# Patient Record
Sex: MALE | Race: WHITE | NOT HISPANIC OR LATINO | Employment: OTHER | URBAN - METROPOLITAN AREA
[De-identification: names, ages, dates, MRNs, and addresses within clinical notes are randomized per-mention and may not be internally consistent; named-entity substitution may affect disease eponyms.]

---

## 2017-01-06 ENCOUNTER — HOSPITAL ENCOUNTER (OUTPATIENT)
Dept: RADIOLOGY | Facility: HOSPITAL | Age: 67
Discharge: HOME | End: 2017-01-06
Attending: SURGERY
Payer: MEDICARE

## 2017-01-06 DIAGNOSIS — I70.1 ATHEROSCLEROSIS OF RENAL ARTERY: ICD-10-CM

## 2017-01-06 PROCEDURE — 70498 CT ANGIOGRAPHY NECK: CPT | Mod: TC

## 2017-01-06 PROCEDURE — 25500020 PHARM REV CODE 255: Performed by: SURGERY

## 2017-01-06 PROCEDURE — 74174 CTA ABD&PLVS W/CONTRAST: CPT | Mod: 26,,, | Performed by: INTERNAL MEDICINE

## 2017-01-06 PROCEDURE — 71275 CT ANGIOGRAPHY CHEST: CPT | Mod: 26,,, | Performed by: INTERNAL MEDICINE

## 2017-01-06 PROCEDURE — 70498 CT ANGIOGRAPHY NECK: CPT | Mod: 26,,, | Performed by: INTERNAL MEDICINE

## 2017-01-06 PROCEDURE — 74174 CTA ABD&PLVS W/CONTRAST: CPT | Mod: TC

## 2017-01-06 RX ADMIN — IOHEXOL 100 ML: 350 INJECTION, SOLUTION INTRAVENOUS at 10:01

## 2017-01-31 ENCOUNTER — HOSPITAL ENCOUNTER (OUTPATIENT)
Dept: PREADMISSION TESTING | Facility: HOSPITAL | Age: 67
Discharge: HOME | End: 2017-01-31
Attending: SURGERY
Payer: MEDICARE

## 2017-01-31 VITALS
SYSTOLIC BLOOD PRESSURE: 179 MMHG | OXYGEN SATURATION: 97 % | HEART RATE: 61 BPM | BODY MASS INDEX: 18.79 KG/M2 | HEIGHT: 68 IN | WEIGHT: 124 LBS | DIASTOLIC BLOOD PRESSURE: 71 MMHG | RESPIRATION RATE: 18 BRPM | TEMPERATURE: 97 F

## 2017-01-31 DIAGNOSIS — I65.29 CAROTID STENOSIS: ICD-10-CM

## 2017-01-31 LAB
ANION GAP SERPL CALC-SCNC: 7 MMOL/L
BASOPHILS # BLD AUTO: 0.05 K/UL
BASOPHILS NFR BLD: 0.6 %
BUN SERPL-MCNC: 18 MG/DL
CALCIUM SERPL-MCNC: 9.3 MG/DL
CHLORIDE SERPL-SCNC: 95 MMOL/L
CO2 SERPL-SCNC: 31 MMOL/L
CREAT SERPL-MCNC: 0.9 MG/DL
DIFFERENTIAL METHOD: ABNORMAL
EOSINOPHIL # BLD AUTO: 1.6 K/UL
EOSINOPHIL NFR BLD: 19.1 %
ERYTHROCYTE [DISTWIDTH] IN BLOOD BY AUTOMATED COUNT: 12.1 %
EST. GFR  (AFRICAN AMERICAN): >60 ML/MIN/1.73 M^2
EST. GFR  (NON AFRICAN AMERICAN): >60 ML/MIN/1.73 M^2
GLUCOSE SERPL-MCNC: 78 MG/DL
HCT VFR BLD AUTO: 42.6 %
HGB BLD-MCNC: 14.7 G/DL
INR PPP: 0.9
LYMPHOCYTES # BLD AUTO: 1.2 K/UL
LYMPHOCYTES NFR BLD: 14.2 %
MCH RBC QN AUTO: 33 PG
MCHC RBC AUTO-ENTMCNC: 34.5 %
MCV RBC AUTO: 96 FL
MONOCYTES # BLD AUTO: 1 K/UL
MONOCYTES NFR BLD: 11.9 %
NEUTROPHILS # BLD AUTO: 4.5 K/UL
NEUTROPHILS NFR BLD: 54.2 %
PLATELET # BLD AUTO: 241 K/UL
PMV BLD AUTO: 9.3 FL
POTASSIUM SERPL-SCNC: 3.7 MMOL/L
PROTHROMBIN TIME: 10 SEC
RBC # BLD AUTO: 4.45 M/UL
SODIUM SERPL-SCNC: 133 MMOL/L
WBC # BLD AUTO: 8.23 K/UL

## 2017-01-31 PROCEDURE — 85610 PROTHROMBIN TIME: CPT

## 2017-01-31 PROCEDURE — 85025 COMPLETE CBC W/AUTO DIFF WBC: CPT

## 2017-01-31 PROCEDURE — 36415 COLL VENOUS BLD VENIPUNCTURE: CPT

## 2017-01-31 PROCEDURE — 80048 BASIC METABOLIC PNL TOTAL CA: CPT

## 2017-01-31 NOTE — PLAN OF CARE
Pre-operative instructions, medication directives and pain scales reviewed with . All questions the patient had  were answered. Re-assurance about surgical procedure and day of surgery routine given as needed. The patient verbalized understanding of the pre-op instructions.    2/1/17   ANGIOGRAM-CAROTID-   bilat

## 2017-01-31 NOTE — IP AVS SNAPSHOT
[place] [address] [place] [address]  Phone: [phone]           Patient Discharge Instructions    Our goal is to set you up for success. This packet includes information on your condition, medications, and your home care. It will help you to care for yourself so you don't get sicker.     Please ask your nurse if you have any questions.        There are many details to remember when preparing for your surgery. Here is what you will need to do, please ask your nurse if there are more specific instructions and if you have any questions:    1. 24 hours before procedure Do not smoke or drink alcoholic beverages 24 hours prior to your procedure    2. Eating before procedure Do not eat or drink anything 8 hours before your procedure - this includes gum, mints, and candy.     3. Day of procedure Please remove all jewelry for the procedure. If you wear contact lenses, dentures, hearing aids or glasses, bring a container to put them in during your surgery and give to a family member for safekeeping.  If your doctor has scheduled you for an overnight stay, bring a small overnight bag with any personal items that you need.    4. After procedure Make arrangements in advance for transportation home by a responsible adult. It is not safe to drive a vehicle during the 24 hours following surgery.     PLEASE NOTE: You may be contacted the day before your surgery to confirm your surgery date and arrival time. The Surgery schedule has many variables which may affect the time of your surgery case. Family members should be available if your surgery time changes.                ** Verify the list of medication(s) below is accurate and up to date. Carry this with you in case of emergency. If your medications have changed, please notify your healthcare provider.             Medication List      TAKE these medications        Additional Info                      amlodipine 10 MG tablet   Commonly known  as:  NORVASC   Refills:  3   Dose:  10 mg    Instructions:  Take 10 mg by mouth once daily.     Begin Date    AM    Noon    PM    Bedtime       aspirin 81 MG EC tablet   Commonly known as:  ECOTRIN   Refills:  0   Dose:  81 mg    Instructions:  Take 81 mg by mouth once daily.     Begin Date    AM    Noon    PM    Bedtime       atorvastatin 20 MG tablet   Commonly known as:  LIPITOR   Refills:  3   Dose:  20 mg    Instructions:  Take 20 mg by mouth once daily.     Begin Date    AM    Noon    PM    Bedtime       hydrochlorothiazide 25 MG tablet   Commonly known as:  HYDRODIURIL   Refills:  0   Dose:  25 mg    Instructions:  Take 25 mg by mouth every evening.     Begin Date    AM    Noon    PM    Bedtime       lisinopril 40 MG tablet   Commonly known as:  PRINIVIL,ZESTRIL   Refills:  3   Dose:  40 mg    Instructions:  Take 40 mg by mouth once daily.     Begin Date    AM    Noon    PM    Bedtime       losartan 100 MG tablet   Commonly known as:  COZAAR   Refills:  0   Dose:  1 tablet    Instructions:  Take 1 tablet by mouth once daily.     Begin Date    AM    Noon    PM    Bedtime       metoprolol succinate 200 MG 24 hr tablet   Commonly known as:  TOPROL-XL   Refills:  2   Dose:  200 mg    Instructions:  Take 200 mg by mouth 2 (two) times daily.     Begin Date    AM    Noon    PM    Bedtime                  Please bring to all follow up appointments:    1. A copy of your discharge instructions.  2. All medicines you are currently taking in their original bottles.  3. Identification and insurance card.    Please arrive 15 minutes ahead of scheduled appointment time.    Please call 24 hours in advance if you must reschedule your appointment and/or time.        Your Scheduled Appointments     Jan 31, 2017  2:30 PM CST   Consult with Jaci Prieto MD   [facility] ([facility])    [address] [facility] [address] [place] [address]   [phone]            Feb 01, 2017  8:00 AM CST   Ir Dosc with [facility] IR1    [facility] ([facility]    [place] [id number] [phone]              Your Future Surgeries/Procedures     Feb 01, 2017   Surgery with [facility] Diagnostic Provider   [facility] ([facility]    [place] [id number] [phone]                  Discharge Instructions       Your   Carotid   angiogram is scheduled for ___tomorrow   2/1/17_________________.    Please report to SAME DAY SURGERY UNIT on the 2nd FLOOR at __0645-0700_____ a.m.      INSTRUCTIONS IMPORTANT!!!  ¨ Do not eat or drink after 12 midnight-including water. OK to brush teeth, no   gum, candy or mints!    ¨ Take only these medicines with a small swallow of water-morning of angiogram.    Regular morning BP medications except no Hydrochlorothiazide      X____Shower with Hibiclens and wash both groin  Areas  with Hibiclens each time.  Be sure to rinse your skin completely.  X____  No powder, lotions or creams below your waist.  X____  You may wear only deodorant on the day of the angiogram.  X____  No money or valuables needed. Please leave at home.  X____  If going home the same day, arrange for a ride home. You will not be able to             drive.  X____  Wear loose fitting clothing. Allow for dressings, bandages.  X____  No   Ibuprofen, Motrin and Aleve  before  the angiogram.            You MAY use Tylenol/acetaminophen until day of surgery.          I have read or had read and explained to me, and understand the above information.  Additional comments or instructions.                 Admission Information     Date & Time Provider Department CSN    1/31/2017  1:00 PM Andry Torres MD [facility] [id number]      Care Providers     Provider Role Specialty Primary office phone    Andry Torres MD Attending Provider General Surgery [phone]      Your Vitals Were     BP Pulse Temp Resp Height Weight    179/71 (BP  "Location: Left arm, Patient Position: Sitting, BP Method: Automatic) 61 97.2 °F (36.2 °C) (Oral) 18 5' 8" (1.727 m) 56.2 kg (124 lb)    SpO2 BMI             97% 18.85 kg/m2         Recent Lab Values     No lab values to display.      Allergies as of 1/31/2017     No Known Allergies      [facility]     [facility] Care [facility] - 24/7 Assistance  Unless otherwise directed by your provider, please contact [facility], our nurse care line that is available for 24/7 assistance.     Registered nurses in the [facility] provide clinical advisement, health education, appointment booking, and other advisory services.  Call for this free service at [phone].        Advance Directives     An advance directive is a document which, in the event you are no longer able to make decisions for yourself, tells your healthcare team what kind of treatment you do or do not want to receive, or who you would like to make those decisions for you.  If you do not currently have an advance directive, [facility] encourages you to create one.  For more information call:  [phone]), [phone] ([phone]),  or log on to [email or web address].        Smoking Cessation     If you would like to quit smoking:   You may be eligible for free services if you are a [place] resident and started smoking cigarettes before September 1, 1988.  Call the Smoking Cessation Trust ([facility]) toll free at (226) 387-8256 or (892) 071-5348.   Call 8-533-QUIT-NOW if you do not meet the above criteria.            Language Assistance Services     ATTENTION: Language assistance services are available, free of charge. Please call [phone].      ATENCIÓN: Si habla español, tiene a quinn disposición servicios gratuitos de asistencia lingüística. Llame al [phone].     CHÚ Ý: N?u b?n nói Ti?ng Vi?t, có các d?ch v? h? tr? ngôn ng? mi?n phí dành cho b?n. G?i s? [phone].        [facility] Sign-Up     " Activating your [facility] account is as easy as 1-2-3!     1) Visit [email or web address], select Sign Up Now, enter this activation code and your date of birth, then select Next.  Activation code not generated  Current Patient Portal Status: Account disabled      2) Create a username and password to use when you visit [facility] in the future and select a security question in case you lose your password and select Next.    3) Enter your e-mail address and click Sign Up!    Additional Information  If you have questions, please e-mail [email or web address] or call [phone] to talk to our [facility] staff. Remember, [facility] is NOT to be used for urgent needs. For medical emergencies, dial 911.          [facility] complies with applicable Federal civil rights laws and does not discriminate on the basis of race, color, national origin, age, disability, or sex.

## 2017-01-31 NOTE — DISCHARGE INSTRUCTIONS
Your   Carotid   angiogram is scheduled for ___tomorrow   2/1/17_________________.    Please report to SAME DAY SURGERY UNIT on the 2nd FLOOR at __0645-0700_____ a.m.      INSTRUCTIONS IMPORTANT!!!  ¨ Do not eat or drink after 12 midnight-including water. OK to brush teeth, no   gum, candy or mints!    ¨ Take only these medicines with a small swallow of water-morning of angiogram.    Regular morning BP medications except no Hydrochlorothiazide      X____Shower with Hibiclens and wash both groin  Areas  with Hibiclens each time.  Be sure to rinse your skin completely.  X____  No powder, lotions or creams below your waist.  X____  You may wear only deodorant on the day of the angiogram.  X____  No money or valuables needed. Please leave at home.  X____  If going home the same day, arrange for a ride home. You will not be able to             drive.  X____  Wear loose fitting clothing. Allow for dressings, bandages.  X____  No   Ibuprofen, Motrin and Aleve  before  the angiogram.            You MAY use Tylenol/acetaminophen until day of surgery.          I have read or had read and explained to me, and understand the above information.  Additional comments or instructions.

## 2017-02-01 ENCOUNTER — SURGERY (OUTPATIENT)
Age: 67
End: 2017-02-01

## 2017-02-01 ENCOUNTER — HOSPITAL ENCOUNTER (OUTPATIENT)
Facility: HOSPITAL | Age: 67
Discharge: HOME | End: 2017-02-01
Attending: SURGERY | Admitting: RADIOLOGY
Payer: MEDICARE

## 2017-02-01 VITALS
BODY MASS INDEX: 18.77 KG/M2 | RESPIRATION RATE: 18 BRPM | HEIGHT: 68 IN | WEIGHT: 123.88 LBS | OXYGEN SATURATION: 97 % | SYSTOLIC BLOOD PRESSURE: 154 MMHG | DIASTOLIC BLOOD PRESSURE: 88 MMHG | TEMPERATURE: 98 F | HEART RATE: 66 BPM

## 2017-02-01 DIAGNOSIS — I65.29 CAROTID STENOSIS: ICD-10-CM

## 2017-02-01 DIAGNOSIS — I77.9 CAROTID ARTERY DISEASE: ICD-10-CM

## 2017-02-01 PROCEDURE — 99153 MOD SED SAME PHYS/QHP EA: CPT

## 2017-02-01 PROCEDURE — 99152 MOD SED SAME PHYS/QHP 5/>YRS: CPT

## 2017-02-01 PROCEDURE — 63600175 PHARM REV CODE 636 W HCPCS: Performed by: RADIOLOGY

## 2017-02-01 PROCEDURE — 25000003 PHARM REV CODE 250: Performed by: RADIOLOGY

## 2017-02-01 PROCEDURE — 25500020 PHARM REV CODE 255: Performed by: SURGERY

## 2017-02-01 RX ORDER — HEPARIN SODIUM 1000 [USP'U]/ML
INJECTION, SOLUTION INTRAVENOUS; SUBCUTANEOUS CODE/TRAUMA/SEDATION MEDICATION
Status: COMPLETED | OUTPATIENT
Start: 2017-02-01 | End: 2017-02-01

## 2017-02-01 RX ORDER — FENTANYL CITRATE 50 UG/ML
INJECTION, SOLUTION INTRAMUSCULAR; INTRAVENOUS CODE/TRAUMA/SEDATION MEDICATION
Status: COMPLETED | OUTPATIENT
Start: 2017-02-01 | End: 2017-02-01

## 2017-02-01 RX ORDER — HYDROCODONE BITARTRATE AND ACETAMINOPHEN 5; 325 MG/1; MG/1
1 TABLET ORAL EVERY 4 HOURS PRN
Status: DISCONTINUED | OUTPATIENT
Start: 2017-02-01 | End: 2017-02-01 | Stop reason: HOSPADM

## 2017-02-01 RX ORDER — MIDAZOLAM HYDROCHLORIDE 1 MG/ML
INJECTION, SOLUTION INTRAMUSCULAR; INTRAVENOUS CODE/TRAUMA/SEDATION MEDICATION
Status: COMPLETED | OUTPATIENT
Start: 2017-02-01 | End: 2017-02-01

## 2017-02-01 RX ORDER — SODIUM CHLORIDE 9 MG/ML
INJECTION, SOLUTION INTRAVENOUS CONTINUOUS
Status: DISCONTINUED | OUTPATIENT
Start: 2017-02-01 | End: 2017-02-01 | Stop reason: HOSPADM

## 2017-02-01 RX ORDER — HYDRALAZINE HYDROCHLORIDE 20 MG/ML
INJECTION INTRAMUSCULAR; INTRAVENOUS CODE/TRAUMA/SEDATION MEDICATION
Status: COMPLETED | OUTPATIENT
Start: 2017-02-01 | End: 2017-02-01

## 2017-02-01 RX ADMIN — IOHEXOL 180 ML: 300 INJECTION, SOLUTION INTRAVENOUS at 09:02

## 2017-02-01 RX ADMIN — MIDAZOLAM HYDROCHLORIDE 0.5 MG: 1 INJECTION, SOLUTION INTRAMUSCULAR; INTRAVENOUS at 08:02

## 2017-02-01 RX ADMIN — HEPARIN SODIUM 4000 UNITS: 1000 INJECTION, SOLUTION INTRAVENOUS; SUBCUTANEOUS at 08:02

## 2017-02-01 RX ADMIN — FENTANYL CITRATE 25 MCG: 50 INJECTION INTRAMUSCULAR; INTRAVENOUS at 08:02

## 2017-02-01 RX ADMIN — SODIUM CHLORIDE: 0.9 INJECTION, SOLUTION INTRAVENOUS at 11:02

## 2017-02-01 RX ADMIN — HYDRALAZINE HYDROCHLORIDE 10 MG: 20 INJECTION INTRAMUSCULAR; INTRAVENOUS at 09:02

## 2017-02-01 NOTE — H&P
[facility]  History & Physical - Short Stay  Interventional Radiology    SUBJECTIVE:     Chief Complaint/Reason for Admission: carotid stenosis    Informant(s):  self and Electronic Health Record    History of Present Illness:  Fareed Richard Jr. is a 67 y.o. male with a history of carotid stenosis.      OBJECTIVE:     Vital Signs (Most Recent):  Temp: 97.9 °F (36.6 °C) (02/01/17 1000)  Pulse: 69 (02/01/17 1300)  Resp: 18 (02/01/17 1000)  BP: 129/62 (02/01/17 1300)  SpO2: 96 % (02/01/17 1000)    Physical Exam:  Awake, alert and oriented   In no acute distress  Pe,  Eomi  No labored breathing   Moves extremities spontaneously        ASSESSMENT/PLAN:     Carotid stenosis.    Patient will undergo carotid angiogram.    Sedation/Anesthesia Assessment:  ASA Classification: III = Severe systemic disease limiting activity  Mallampati Score: II (hard and soft palate, upper portion of tonsils anduvula visible)    Sedation History: no problems    Sedation Plan: moderate

## 2017-02-01 NOTE — DISCHARGE INSTRUCTIONS
ANGIOGRAM INSTRUCTIONS                                           Drink plenty of fluids for the next 48 hours and follow your doctor's diet orders.    Rest for the next 72 hours.   Try not to keep the injected leg bent for a long period of time.  Remove the dressing in 48 hours, and you may shower. Clean the area with soap and water, and apply a band aid for the next 5 days.                                                                 No  Lifting over 5-10 lbs., that is, not more than 1 gallon of water, or straining for 72 hours.    No driving, no drinking alcohol, and no signing legal documents for the next 24 hours.    Call your doctor for elevated temperature, shortness of breath, chest pain, or cold discolored foot or leg.    If oozing occurs at the injections site, lie down.  Apply pressure with a clean wash cloth for 20 to 30 minutes and call your doctor.    If severe bleeding occurs, lie down, apply pressure.  Call 911 and request an ambulance to take you to the nearest hospital emergency room.    Continue to take your regular medications as instructed.              Follow up with the doctor that ordered the procedure.    Follow the instructions in the handout given to you.     ***

## 2017-02-01 NOTE — IP AVS SNAPSHOT
[place] [address] [place] [address]  Phone: [phone]           Patient Discharge Instructions     Our goal is to set you up for success. This packet includes information on your condition, medications, and your home care. It will help you to care for yourself so you don't get sicker and need to go back to the hospital.     Please ask your nurse if you have any questions.        There are many details to remember when preparing to leave the hospital. Here is what you will need to do:    1. Take your medicine. If you are prescribed medications, review your Medication List in the following pages. You may have new medications to  at the pharmacy and others that you'll need to stop taking. Review the instructions for how and when to take your medications. Talk with your doctor or nurses if you are unsure of what to do.     2. Go to your follow-up appointments. Specific follow-up information is listed in the following pages. Your may be contacted by a transition nurse or clinical provider about future appointments. Be sure we have all of the phone numbers to reach you, if needed. Please contact your provider's office if you are unable to make an appointment.     3. Watch for warning signs. Your doctor or nurse will give you detailed warning signs to watch for and when to call for assistance. These instructions may also include educational information about your condition. If you experience any of warning signs to your health, call your doctor.               ** Verify the list of medication(s) below is accurate and up to date. Carry this with you in case of emergency. If your medications have changed, please notify your healthcare provider.             Medication List      ASK your doctor about these medications        Additional Info                      amlodipine 10 MG tablet   Commonly known as:  NORVASC   Refills:  3   Dose:  10 mg    Instructions:  Take 10 mg by mouth  once daily.     Begin Date    AM    Noon    PM    Bedtime       aspirin 81 MG EC tablet   Commonly known as:  ECOTRIN   Refills:  0   Dose:  81 mg    Instructions:  Take 81 mg by mouth once daily.     Begin Date    AM    Noon    PM    Bedtime       atorvastatin 20 MG tablet   Commonly known as:  LIPITOR   Refills:  3   Dose:  20 mg    Instructions:  Take 20 mg by mouth once daily.     Begin Date    AM    Noon    PM    Bedtime       hydrochlorothiazide 25 MG tablet   Commonly known as:  HYDRODIURIL   Refills:  0   Dose:  25 mg    Instructions:  Take 25 mg by mouth every evening.     Begin Date    AM    Noon    PM    Bedtime       lisinopril 40 MG tablet   Commonly known as:  PRINIVIL,ZESTRIL   Refills:  3   Dose:  40 mg    Instructions:  Take 40 mg by mouth once daily.     Begin Date    AM    Noon    PM    Bedtime       losartan 100 MG tablet   Commonly known as:  COZAAR   Refills:  0   Dose:  1 tablet    Instructions:  Take 1 tablet by mouth once daily.     Begin Date    AM    Noon    PM    Bedtime       metoprolol succinate 200 MG 24 hr tablet   Commonly known as:  TOPROL-XL   Refills:  2   Dose:  200 mg    Instructions:  Take 200 mg by mouth 2 (two) times daily.     Begin Date    AM    Noon    PM    Bedtime                  Please bring to all follow up appointments:    1. A copy of your discharge instructions.  2. All medicines you are currently taking in their original bottles.  3. Identification and insurance card.    Please arrive 15 minutes ahead of scheduled appointment time.    Please call 24 hours in advance if you must reschedule your appointment and/or time.        Follow-up Information     Schedule an appointment as soon as possible for a visit with Andry Torres MD.    Specialty:  General Surgery    Contact information:    [address] [place], [place] [address] [phone]            Discharge Instructions           ANGIOGRAM INSTRUCTIONS                                           Drink  "plenty of fluids for the next 48 hours and follow your doctor's diet orders.    Rest for the next 72 hours.   Try not to keep the injected leg bent for a long period of time.  Remove the dressing in 48 hours, and you may shower. Clean the area with soap and water, and apply a band aid for the next 5 days.                                                                 No  Lifting over 5-10 lbs., that is, not more than 1 gallon of water, or straining for 72 hours.    No driving, no drinking alcohol, and no signing legal documents for the next 24 hours.    Call your doctor for elevated temperature, shortness of breath, chest pain, or cold discolored foot or leg.    If oozing occurs at the injections site, lie down.  Apply pressure with a clean wash cloth for 20 to 30 minutes and call your doctor.    If severe bleeding occurs, lie down, apply pressure.  Call 911 and request an ambulance to take you to the nearest hospital emergency room.    Continue to take your regular medications as instructed.              Follow up with the doctor that ordered the procedure.    Follow the instructions in the handout given to you.     ***        Admission Information     Date & Time Provider Department CSN    2/1/2017  6:46 AM Andry Torres MD [facility] [id number]      Care Providers     Provider Role Specialty Primary office phone    Andry Torres MD Attending Provider General Surgery [phone]    [facility] Diagnostic Provider Surgeon  -- Number not on file      Your Vitals Were     BP Pulse Temp Resp Height Weight    146/71 61 97.9 °F (36.6 °C) (Oral) 18 5' 8" (1.727 m) 56.2 kg (123 lb 14.4 oz)    SpO2 BMI             96% 18.84 kg/m2         Recent Lab Values     No lab values to display.      Allergies as of 2/1/2017     No Known Allergies      [facility] On Call     [facility] Care [facility] - 24/7 Assistance  Unless otherwise directed by your provider, please contact [facility], our nurse care " line that is available for 24/7 assistance.     Registered nurses in the [facility] provide clinical advisement, health education, appointment booking, and other advisory services.  Call for this free service at [phone].        Advance Directives     An advance directive is a document which, in the event you are no longer able to make decisions for yourself, tells your healthcare team what kind of treatment you do or do not want to receive, or who you would like to make those decisions for you.  If you do not currently have an advance directive, [facility] encourages you to create one.  For more information call:  [phone]), [phone] ([phone]),  or log on to [email or web address].        Smoking Cessation     If you would like to quit smoking:   You may be eligible for free services if you are a [place] resident and started smoking cigarettes before September 1, 1988.  Call the Smoking Cessation Trust ([facility]) toll free at (540) 522-1424 or (955) 777-0606.   Call 8-455-QUIT-NOW if you do not meet the above criteria.            Language Assistance Services     ATTENTION: Language assistance services are available, free of charge. Please call [phone].      ATENCIÓN: Si habla español, tiene a quinn disposición servicios gratuitos de asistencia lingüística. Llame al [phone].     CHÚ Ý: N?u b?n nói Ti?ng Vi?t, có các d?ch v? h? tr? ngôn ng? mi?n phí dành cho b?n. G?i s? [phone].        Stroke Education              [facility] Sign-Up     Activating your [facility] account is as easy as 1-2-3!     1) Visit [email or web address], select Sign Up Now, enter this activation code and your date of birth, then select Next.  Activation code not generated  Current Patient Portal Status: Account disabled      2) Create a username and password to use when you visit [facility] in the future and select a security question in case you lose your password and select Next.    3) Enter  your e-mail address and click Sign Up!    Additional Information  If you have questions, please e-mail [email or web address] or call [phone] to talk to our [facility] staff. Remember, [facility] is NOT to be used for urgent needs. For medical emergencies, dial 911.          [facility] complies with applicable Federal civil rights laws and does not discriminate on the basis of race, color, national origin, age, disability, or sex.

## 2017-03-31 ENCOUNTER — HOSPITAL ENCOUNTER (OUTPATIENT)
Dept: RADIOLOGY | Facility: HOSPITAL | Age: 67
Discharge: HOME | End: 2017-03-31
Attending: SURGERY
Payer: MEDICARE

## 2017-03-31 DIAGNOSIS — I77.811 AORTIC ECTASIA, ABDOMINAL: ICD-10-CM

## 2017-03-31 PROCEDURE — 74185 MRA ABD W OR W/O CNTRST: CPT | Mod: TC

## 2017-03-31 PROCEDURE — 25500020 PHARM REV CODE 255

## 2017-03-31 PROCEDURE — A9585 GADOBUTROL INJECTION: HCPCS

## 2017-03-31 PROCEDURE — 74185 MRA ABD W OR W/O CNTRST: CPT | Mod: 26,,, | Performed by: RADIOLOGY

## 2018-11-05 ENCOUNTER — OFFICE VISIT (OUTPATIENT)
Dept: OTOLARYNGOLOGY | Facility: CLINIC | Age: 68
End: 2018-11-05
Payer: MEDICARE

## 2018-11-05 ENCOUNTER — CLINICAL SUPPORT (OUTPATIENT)
Dept: AUDIOLOGY | Facility: CLINIC | Age: 68
End: 2018-11-05
Payer: MEDICARE

## 2018-11-05 VITALS
DIASTOLIC BLOOD PRESSURE: 72 MMHG | WEIGHT: 150 LBS | HEIGHT: 67 IN | SYSTOLIC BLOOD PRESSURE: 130 MMHG | BODY MASS INDEX: 23.54 KG/M2

## 2018-11-05 DIAGNOSIS — H90.3 SENSORINEURAL HEARING LOSS (SNHL) OF BOTH EARS: Primary | ICD-10-CM

## 2018-11-05 DIAGNOSIS — H90.3 SENSORINEURAL HEARING LOSS, BILATERAL: Primary | ICD-10-CM

## 2018-11-05 PROCEDURE — 92550 TYMPANOMETRY & REFLEX THRESH: CPT | Mod: S$GLB,,, | Performed by: AUDIOLOGIST

## 2018-11-05 PROCEDURE — 99203 OFFICE O/P NEW LOW 30 MIN: CPT | Mod: S$GLB,,, | Performed by: OTOLARYNGOLOGY

## 2018-11-05 PROCEDURE — 92557 COMPREHENSIVE HEARING TEST: CPT | Mod: S$GLB,,, | Performed by: AUDIOLOGIST

## 2018-11-05 PROCEDURE — 3078F DIAST BP <80 MM HG: CPT | Mod: CPTII,S$GLB,, | Performed by: OTOLARYNGOLOGY

## 2018-11-05 PROCEDURE — 3075F SYST BP GE 130 - 139MM HG: CPT | Mod: CPTII,S$GLB,, | Performed by: OTOLARYNGOLOGY

## 2018-11-05 RX ORDER — CLOPIDOGREL BISULFATE 75 MG/1
75 TABLET ORAL DAILY
Refills: 1 | Status: ON HOLD | COMMUNITY
Start: 2018-09-30 | End: 2022-01-04 | Stop reason: CLARIF

## 2018-11-05 RX ORDER — ALBUTEROL SULFATE 0.83 MG/ML
SOLUTION RESPIRATORY (INHALATION)
Refills: 3 | COMMUNITY
Start: 2018-08-08 | End: 2018-12-27 | Stop reason: SDUPTHER

## 2018-11-05 RX ORDER — BUPROPION HYDROCHLORIDE 100 MG/1
100 TABLET ORAL DAILY
Refills: 3 | COMMUNITY
Start: 2018-09-02 | End: 2021-12-28 | Stop reason: CLARIF

## 2018-11-05 NOTE — LETTER
November 5, 2018      Kodi Tubbs MD  [address] [facility]  [address]  [place] [address]           [facility] Otolaryngology  [address] [facility] [address]  [place] [address]  Phone: [phone]          Patient: Fareed Richard Jr.   MR Number: [id number]   YOB: 1950   Date of Visit: 11/5/2018       Dear Dr. Kodi Tubbs:    Thank you for referring Fareed Richard to me for evaluation. Attached you will find relevant portions of my assessment and plan of care.    If you have questions, please do not hesitate to call me. I look forward to following Fareed Richard along with you.    Sincerely,    Paulo Villarreal MD    Enclosure  CC:  No Recipients    If you would like to receive this communication electronically, please contact [email or web address] or [phone] to request more information on [employer] Link access.    For providers and/or their staff who would like to refer a patient to [facility], please contact us through our one-stop-shop provider referral line, [facility], at [phone].    If you feel you have received this communication in error or would no longer like to receive these types of communications, please e-mail [email or web address]

## 2018-11-05 NOTE — PROGRESS NOTES
History of Present Illness:  Fareed Richard Jr. presents to the clinic today for Hearing Loss (people say he can't hear)  .  He is a 68-year-old  who is here today because his family has told him that he cannot hear much of the conversation that they tried have with him.  He has had some ringing in his ears.  He was a .  He also is a Ravi and fisherman.  He has had no ear infections.    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 No pertinent family history.    Social History     Tobacco Use    Smoking status: Former Smoker     Packs/day: 2.00     Years: 40.00     Pack years: 80.00     Types: Cigarettes     Start date: 1963     Last attempt to quit: 2018     Years since quittin.5    Smokeless tobacco: Never Used    Tobacco comment: 3/3 ppd x 40 yrs. Currently 3-4 cigarettes daily .He is trying  to quit. Is using a Vapor cigarettes  2-3 x's a day.   Substance Use Topics    Alcohol use: Yes     Alcohol/week: 1.8 oz     Types: 3 Cans of beer per week     Frequency: 2-3 times a week     Comment: beer daily 3-4    Drug use: No         Review of Systems   Ears: Positive for hearing loss and ringing in ear.  Negative for ear pain, ear pressure, ear discharge, ear infections and dizziness.    Nose:  Negative for nasal obstruction.        Physical Exam:    Constitutional:   Vital signs are normal. He appears well-developed and well-nourished. He is active.     Head:  Normocephalic. Salivary glands normal.  Facial strength is normal.    Over  his left parotid gland his a large skin graft where Dr. Narayan Starks removed a large skin cancer and grafted his face.  It is well healed and there is no evidence of recurrent cancer.  Parotid gland has no masses or adenopathy. His neck is free of adenopathy.     Ears:  Hearing normal to normal and whispered voice; external ear normal without scars, lesions, or masses; ear canal, tympanic membrane, and middle ear normal..     Nose:  Nose normal including turbinates, nasal mucosa, sinuses and nasal septum.     Mouth/Throat  Lips, teeth, and gums normal and oropharynx normal.     Neck:  Neck normal without thyromegaly masses, asymmetry, normal tracheal structure, crepitus, and tenderness, thyroid normal, trachea normal and no adenopathy.          Assessment:   Fareed was seen today for hearing loss.    Diagnoses and all orders for this visit:    Sensorineural hearing loss (SNHL) of both ears  -     COMPREHENSIVE AUDIOGRAM; Future          Plan:   I am referring him to audiology for a full hearing evaluation.  I recommended that he return once a year to have his ears checked and cleaned as necessary and the audiogram repeated.    Follow-up in about 1 year (around 11/5/2019).

## 2018-11-05 NOTE — PROGRESS NOTES
Click the link below to view the full audiogram:  Document on 11/5/2018 11:24 AM by DANIELLE Montgomery.D: Audiogram      Reason for visit:  Pt referred today by Dr. Villarreal following and ENT evaluation.  Pt reported that his wife scheduled his appointment because she thinks he has hearing loss.  He feels that he may misunderstand speech occasionally, but generally he feels he functions well.    Tymps:  Type Ad; AD, Type A; AS  Acoustic Reflexes:  Present at 500 and 1000 Hz; AU  Audio Summary  AD:  Normal sloping to moderate-severe SNHL      AS:  Normal sloping to severe SNHL  Recommendations:  Results discussed with patient at length.  We discussed how his hearing loss would effect his speech understanding and hearing in noise.  Hearing aids were recommended.  Pt feels that he is not interested at this time.  Annual hearing testing recommended.

## 2018-11-05 NOTE — PATIENT INSTRUCTIONS
Return to the office in 1 year to examine your ears, remove any ear wax, and check your hearing.

## 2018-12-27 ENCOUNTER — HOSPITAL ENCOUNTER (EMERGENCY)
Facility: HOSPITAL | Age: 68
Discharge: HOME | End: 2018-12-27
Attending: EMERGENCY MEDICINE
Payer: MEDICARE

## 2018-12-27 VITALS
WEIGHT: 148 LBS | OXYGEN SATURATION: 97 % | SYSTOLIC BLOOD PRESSURE: 143 MMHG | HEART RATE: 71 BPM | DIASTOLIC BLOOD PRESSURE: 65 MMHG | TEMPERATURE: 98 F | HEIGHT: 68 IN | BODY MASS INDEX: 22.43 KG/M2 | RESPIRATION RATE: 19 BRPM

## 2018-12-27 DIAGNOSIS — R06.02 SOB (SHORTNESS OF BREATH): ICD-10-CM

## 2018-12-27 DIAGNOSIS — J44.1 COPD WITH ACUTE EXACERBATION: Primary | ICD-10-CM

## 2018-12-27 LAB
ALBUMIN SERPL BCP-MCNC: 3.2 G/DL
ALLENS TEST: ABNORMAL
ALP SERPL-CCNC: 72 U/L
ALT SERPL W/O P-5'-P-CCNC: 13 U/L
ANION GAP SERPL CALC-SCNC: 12 MMOL/L
AST SERPL-CCNC: 16 U/L
BASOPHILS # BLD AUTO: 0.03 K/UL
BASOPHILS NFR BLD: 0.4 %
BILIRUB SERPL-MCNC: 0.5 MG/DL
BNP SERPL-MCNC: 239 PG/ML
BUN SERPL-MCNC: 10 MG/DL
CALCIUM SERPL-MCNC: 9.8 MG/DL
CHLORIDE SERPL-SCNC: 88 MMOL/L
CO2 SERPL-SCNC: 28 MMOL/L
CREAT SERPL-MCNC: 0.8 MG/DL
DELSYS: ABNORMAL
DIFFERENTIAL METHOD: ABNORMAL
EOSINOPHIL # BLD AUTO: 0.9 K/UL
EOSINOPHIL NFR BLD: 10.1 %
ERYTHROCYTE [DISTWIDTH] IN BLOOD BY AUTOMATED COUNT: 12.9 %
EST. GFR  (AFRICAN AMERICAN): >60 ML/MIN/1.73 M^2
EST. GFR  (NON AFRICAN AMERICAN): >60 ML/MIN/1.73 M^2
FLOW: 2
GLUCOSE SERPL-MCNC: 111 MG/DL
HCO3 UR-SCNC: 29.4 MMOL/L (ref 24–28)
HCT VFR BLD AUTO: 34.7 %
HGB BLD-MCNC: 11.7 G/DL
INR PPP: 1
LYMPHOCYTES # BLD AUTO: 1.2 K/UL
LYMPHOCYTES NFR BLD: 14 %
MAGNESIUM SERPL-MCNC: 1.8 MG/DL
MCH RBC QN AUTO: 30.2 PG
MCHC RBC AUTO-ENTMCNC: 33.7 G/DL
MCV RBC AUTO: 89 FL
MODE: ABNORMAL
MONOCYTES # BLD AUTO: 0.9 K/UL
MONOCYTES NFR BLD: 10.9 %
NEUTROPHILS # BLD AUTO: 5.4 K/UL
NEUTROPHILS NFR BLD: 64.6 %
PCO2 BLDA: 42.6 MMHG (ref 35–45)
PH SMN: 7.45 [PH] (ref 7.35–7.45)
PLATELET # BLD AUTO: 346 K/UL
PMV BLD AUTO: 7.8 FL
PO2 BLDA: 84 MMHG (ref 80–100)
POC BE: 5 MMOL/L
POC SATURATED O2: 97 % (ref 95–100)
POC TCO2: 31 MMOL/L (ref 23–27)
POTASSIUM SERPL-SCNC: 3.9 MMOL/L
PROT SERPL-MCNC: 7.2 G/DL
PROTHROMBIN TIME: 10 SEC
RBC # BLD AUTO: 3.88 M/UL
SAMPLE: ABNORMAL
SITE: ABNORMAL
SODIUM SERPL-SCNC: 128 MMOL/L
SP02: 96
TROPONIN I SERPL DL<=0.01 NG/ML-MCNC: <0.006 NG/ML
WBC # BLD AUTO: 8.42 K/UL

## 2018-12-27 PROCEDURE — 85610 PROTHROMBIN TIME: CPT

## 2018-12-27 PROCEDURE — 93010 ELECTROCARDIOGRAM REPORT: CPT | Mod: ,,, | Performed by: INTERNAL MEDICINE

## 2018-12-27 PROCEDURE — 83880 ASSAY OF NATRIURETIC PEPTIDE: CPT

## 2018-12-27 PROCEDURE — 80053 COMPREHEN METABOLIC PANEL: CPT

## 2018-12-27 PROCEDURE — 94640 AIRWAY INHALATION TREATMENT: CPT

## 2018-12-27 PROCEDURE — 93005 ELECTROCARDIOGRAM TRACING: CPT

## 2018-12-27 PROCEDURE — 83735 ASSAY OF MAGNESIUM: CPT

## 2018-12-27 PROCEDURE — 85025 COMPLETE CBC W/AUTO DIFF WBC: CPT

## 2018-12-27 PROCEDURE — 36600 WITHDRAWAL OF ARTERIAL BLOOD: CPT

## 2018-12-27 PROCEDURE — 82803 BLOOD GASES ANY COMBINATION: CPT

## 2018-12-27 PROCEDURE — 99900035 HC TECH TIME PER 15 MIN (STAT)

## 2018-12-27 PROCEDURE — 63600175 PHARM REV CODE 636 W HCPCS: Performed by: EMERGENCY MEDICINE

## 2018-12-27 PROCEDURE — 84484 ASSAY OF TROPONIN QUANT: CPT

## 2018-12-27 PROCEDURE — 25000242 PHARM REV CODE 250 ALT 637 W/ HCPCS: Performed by: EMERGENCY MEDICINE

## 2018-12-27 PROCEDURE — 99284 EMERGENCY DEPT VISIT MOD MDM: CPT | Mod: 25

## 2018-12-27 PROCEDURE — 96374 THER/PROPH/DIAG INJ IV PUSH: CPT

## 2018-12-27 RX ORDER — METHYLPREDNISOLONE SOD SUCC 125 MG
125 VIAL (EA) INJECTION
Status: COMPLETED | OUTPATIENT
Start: 2018-12-27 | End: 2018-12-27

## 2018-12-27 RX ORDER — HYDRALAZINE HYDROCHLORIDE 25 MG/1
25 TABLET, FILM COATED ORAL 3 TIMES DAILY
Status: ON HOLD | COMMUNITY
End: 2022-01-19 | Stop reason: HOSPADM

## 2018-12-27 RX ORDER — IPRATROPIUM BROMIDE AND ALBUTEROL SULFATE 2.5; .5 MG/3ML; MG/3ML
3 SOLUTION RESPIRATORY (INHALATION)
Status: COMPLETED | OUTPATIENT
Start: 2018-12-27 | End: 2018-12-27

## 2018-12-27 RX ORDER — DOXYCYCLINE 100 MG/1
100 CAPSULE ORAL 2 TIMES DAILY
Qty: 20 CAPSULE | Refills: 0 | Status: SHIPPED | OUTPATIENT
Start: 2018-12-27 | End: 2019-01-06

## 2018-12-27 RX ORDER — PREDNISONE 20 MG/1
40 TABLET ORAL DAILY
Qty: 10 TABLET | Refills: 0 | Status: SHIPPED | OUTPATIENT
Start: 2018-12-27 | End: 2019-01-01

## 2018-12-27 RX ORDER — ALBUTEROL SULFATE 0.83 MG/ML
2.5 SOLUTION RESPIRATORY (INHALATION) EVERY 6 HOURS PRN
Qty: 1 BOX | Refills: 3 | Status: SHIPPED | OUTPATIENT
Start: 2018-12-27 | End: 2019-01-26

## 2018-12-27 RX ADMIN — IPRATROPIUM BROMIDE AND ALBUTEROL SULFATE 3 ML: .5; 3 SOLUTION RESPIRATORY (INHALATION) at 03:12

## 2018-12-27 RX ADMIN — METHYLPREDNISOLONE SODIUM SUCCINATE 125 MG: 125 INJECTION, POWDER, FOR SOLUTION INTRAMUSCULAR; INTRAVENOUS at 03:12

## 2018-12-27 NOTE — ED PROVIDER NOTES
"Encounter Date: 12/27/2018    SORT:  68 y.o. male presenting to ED for SOB. Limited triage exam:  Non-toxic. If orders were placed, they are pending.     Anthony Rosen PA-C  12/27/2018  1:01 PM   SCRIBE #1 NOTE: I, Ahmet Hitchcock, am scribing for, and in the presence of,  Otilio Pate MD. I have scribed the following portions of the note - Other sections scribed: HPI, ROS, PE.       History     Chief Complaint   Patient presents with    Shortness of Breath     States "I can't breathe" x 2 days.  States he uses oxygen at night when he sleeps.  Felt like he needed to use it during the day.       CC: SOB    69 y/o male with skin cancer, COPD, HLD, HTN, Pseudoaneurysm, abdominal surgery, carotid stent, coronary stent placement, x2 b/l carotid angiogram, and skin cancer excision presents to the ED for emergent evaluation of SOB that started last wk. The symptoms are worse with exertion. The patient's wife thinks the patient's symptoms are due to using a "dirty/gunky" mouth piece on his nebulizer machine. The patient takes x4 breathing treatments daily. The patient's last breathing treatment was this morning after changing the mouth piece. Needs refill on solution.  The patient is on 2L home O2. The patient quit smoking x8 months ago. The patient is also c/o nonproductive cough at night. The patient denies any recent steroids or abx. They are here because they want to make sure he does not have pneumonia. The patient denies chest pain, difficulty urinating, abdominal pain, N/V/D, constipation, or appetite change. No other symptoms reported.       The history is provided by the patient. No  was used.     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    ANGIOGRAM-CAROTID Bilateral 3/18/2016    Performed by [facility]    CAROTID STENT      CORONARY STENT PLACEMENT  2000    EYE SURGERY      Cataract bilateral    femoral stents      bilateral    SKIN CANCER EXCISION       History reviewed. No pertinent family history.  Social History     Tobacco Use    Smoking status: Former Smoker     Packs/day: 2.00     Years: 40.00     Pack years: 80.00     Types: Cigarettes     Start date: 1963     Last attempt to quit: 2018     Years since quittin.6    Smokeless tobacco: Never Used    Tobacco comment: 3/3 ppd x 40 yrs. Currently 3-4 cigarettes daily .He is trying  to quit. Is using a Vapor cigarettes  2-3 x's a day.   Substance Use Topics    Alcohol use: Yes     Alcohol/week: 1.8 oz     Types: 3 Cans of beer per week     Frequency: 2-3 times a week     Comment: beer daily 3-4    Drug use: No     Review of Systems   Constitutional: Negative for appetite change, chills and fever.   HENT: Negative for congestion, ear pain, rhinorrhea and sore throat.    Eyes: Negative for redness.   Respiratory: Positive for cough (nonproductive at night) and shortness of breath.    Cardiovascular: Negative for chest pain.   Gastrointestinal: Negative for abdominal pain, constipation, diarrhea, nausea and vomiting.   Genitourinary: Negative for decreased urine volume, difficulty urinating, dysuria, frequency, hematuria and urgency.   Musculoskeletal: Negative for back pain and neck pain.   Skin: Negative for rash.   Neurological: Negative for headaches.   Psychiatric/Behavioral: Negative for confusion.   All other systems reviewed and are negative.      Physical Exam     Initial Vitals [18 1258]   BP Pulse Resp Temp SpO2   (!) 150/67 75 16 98.6 °F  (37 °C) (!) 90 %      MAP       --         Physical Exam    Nursing note and vitals reviewed.  Constitutional: Vital signs are normal. He appears well-developed and well-nourished. He is active.  Non-toxic appearance. No distress.   HENT:   Head: Normocephalic and atraumatic.   Eyes: EOM are normal.   Neck: Trachea normal. Neck supple.   Cardiovascular: Normal rate and regular rhythm.   Pulmonary/Chest: No respiratory distress (acute). He has wheezes (throughout all lung fields).   Course breath sounds throughout all lung fields.   Abdominal: Soft. Normal appearance and bowel sounds are normal. He exhibits no distension. There is no tenderness.   Musculoskeletal: Normal range of motion. He exhibits no edema.   Neurological: He is alert.   Skin: Skin is warm, dry and intact.   Psychiatric: He has a normal mood and affect.         ED Course   Procedures  Labs Reviewed   CBC W/ AUTO DIFFERENTIAL - Abnormal; Notable for the following components:       Result Value    RBC 3.88 (*)     Hemoglobin 11.7 (*)     Hematocrit 34.7 (*)     MPV 7.8 (*)     Eos # 0.9 (*)     Lymph% 14.0 (*)     Eosinophil% 10.1 (*)     All other components within normal limits   COMPREHENSIVE METABOLIC PANEL - Abnormal; Notable for the following components:    Sodium 128 (*)     Chloride 88 (*)     Glucose 111 (*)     Albumin 3.2 (*)     All other components within normal limits   B-TYPE NATRIURETIC PEPTIDE - Abnormal; Notable for the following components:     (*)     All other components within normal limits   ISTAT PROCEDURE - Abnormal; Notable for the following components:    POC HCO3 29.4 (*)     POC TCO2 31 (*)     All other components within normal limits   TROPONIN I   MAGNESIUM   PROTIME-INR     EKG Readings: (Independently Interpreted)   Rhythm: Normal Sinus Rhythm. Heart Rate: 70.   Nonspecific ST changes. Left axis deviation.        Imaging Results          X-Ray Chest AP Portable (Final result)  Result time 12/27/18 15:31:23     Final result by Mau Cano MD (12/27/18 15:31:23)                 Impression:      Prominence of the central pulmonary vasculature with mild coarsening of the interstitial markings within the lungs.  Mild pulmonary edema cannot be excluded.    Minimal blunting of the right costophrenic angle laterally.  A small right-sided pleural effusion should be considered.    Extensive atherosclerosis.    Surgical changes.      Electronically signed by: Mau Cano MD  Date:    12/27/2018  Time:    15:31             Narrative:    EXAMINATION:  XR CHEST AP PORTABLE    CLINICAL HISTORY:  sob;    TECHNIQUE:  Single frontal view of the chest was performed.    COMPARISON:  11/01/2016.    FINDINGS:  The heart is not enlarged.  Atherosclerotic calcification is present within the thoracic aorta.  Atherosclerotic calcification is also identified within the subclavian and axillary arteries as well as within the carotid arteries.  There are surgical changes identified within the neck bilaterally.  There is mild prominence of the central pulmonary vasculature.  The lungs are free of focal consolidations.  There are coarsened interstitial markings within the lungs.  There is mild blunting of the right costophrenic angle laterally and a trace right-sided pleural effusion cannot be excluded.  Surgical clips project over the right axilla.                                 Medical Decision Making:   ED Management:  The patient hears had a full workup and evaluation.  Everything resulted negative for anything acute.  Chest x-ray does not show any acute pneumonia or consolidation.  He received breathing treatment and steroids here.  He states he feels much improved.  I did offer admission for continued breathing treatments if he was not feeling well enough however patient wishes refuse any type of admission at this time.  He is requesting discharge home and states he would like to follow up closely with his primary care provider as an  outpatient.  I will discharge him home with short course of steroids and also some antibiotics.  I will also refill his nebulizer solution at his request.  Patient here looks well and breath sounds are much improved.  As result patient will be discharged home.  He was instructed to return to the ER for any worsening condition or any other emergent concerns.            Scribe Attestation:   Scribe #1: I performed the above scribed service and the documentation accurately describes the services I performed. I attest to the accuracy of the note.    Attending Attestation:           Physician Attestation for Scribe:  Physician Attestation Statement for Scribe #1: I, Otilio Pate MD, reviewed documentation, as scribed by Ahmet Hitchcock in my presence, and it is both accurate and complete.                    Clinical Impression:   The primary encounter diagnosis was COPD with acute exacerbation. A diagnosis of SOB (shortness of breath) was also pertinent to this visit.      Disposition:   Disposition: Discharged  Condition: Stable           I, Otilio Capps MD, personally performed the services described in this documentation. All medical record entries made by the scribe were at my direction and in my presence.   I have reviewed the chart and agree that the record reflects my personal performance and is accurate and complete.     Otilio Capps MD  12/27/18 3557

## 2018-12-27 NOTE — PROGRESS NOTES
Results for PRANAV TRIPLETT  (MRN [id number]) as of 12/27/2018 15:10   Ref. Range 12/27/2018 15:03   POC PH Latest Ref Range: 7.35 - 7.45  7.447   POC PCO2 Latest Ref Range: 35 - 45 mmHg 42.6   POC PO2 Latest Ref Range: 80 - 100 mmHg 84   POC BE Latest Ref Range: -2 to 2 mmol/L 5   POC HCO3 Latest Ref Range: 24 - 28 mmol/L 29.4 (H)   POC SATURATED O2 Latest Ref Range: 95 - 100 % 97   POC TCO2 Latest Ref Range: 23 - 27 mmol/L 31 (H)   Flow Unknown 2   Sample Unknown ARTERIAL   DelSys Unknown Nasal Can   Allens Test Unknown Pass   Site Unknown LR   Mode Unknown SPONT

## 2018-12-27 NOTE — ED TRIAGE NOTES
Patient presents to the ED via personal transportation with wife. Patient reports sob x 3 days, wheezing. Denies cough, fever, chills, nasal congestion, ear pain, or sore throat. Patient reports hx of COPD and wear 2L of O2 at home as needed. Patient states that his home medications, inhalers and nebulizer tx are not helping. Patient wife states that she has been confused for about 1 week. Patient is AAOx4 and speaking in complete sentences,.

## 2019-02-07 ENCOUNTER — HOSPITAL ENCOUNTER (OUTPATIENT)
Dept: RADIOLOGY | Facility: HOSPITAL | Age: 69
Discharge: HOME | End: 2019-02-07
Attending: FAMILY MEDICINE
Payer: MEDICARE

## 2019-02-07 DIAGNOSIS — J44.1 CHRONIC OBSTRUCTIVE PULMONARY DISEASE WITH ACUTE EXACERBATION: ICD-10-CM

## 2019-02-07 DIAGNOSIS — J44.1 CHRONIC OBSTRUCTIVE PULMONARY DISEASE WITH ACUTE EXACERBATION: Primary | ICD-10-CM

## 2019-02-07 PROCEDURE — 71046 X-RAY EXAM CHEST 2 VIEWS: CPT | Mod: TC,FY

## 2019-02-07 PROCEDURE — 71046 X-RAY EXAM CHEST 2 VIEWS: CPT | Mod: 26,,, | Performed by: RADIOLOGY

## 2019-02-07 PROCEDURE — 71046 XR CHEST PA AND LATERAL: ICD-10-PCS | Mod: 26,,, | Performed by: RADIOLOGY

## 2019-11-02 ENCOUNTER — HOSPITAL ENCOUNTER (EMERGENCY)
Facility: HOSPITAL | Age: 69
Discharge: HOME | End: 2019-11-02
Attending: EMERGENCY MEDICINE
Payer: MEDICARE

## 2019-11-02 VITALS
HEIGHT: 68 IN | WEIGHT: 153 LBS | HEART RATE: 74 BPM | DIASTOLIC BLOOD PRESSURE: 60 MMHG | SYSTOLIC BLOOD PRESSURE: 123 MMHG | OXYGEN SATURATION: 96 % | RESPIRATION RATE: 18 BRPM | TEMPERATURE: 99 F | BODY MASS INDEX: 23.19 KG/M2

## 2019-11-02 DIAGNOSIS — J44.1 COPD EXACERBATION: Primary | ICD-10-CM

## 2019-11-02 DIAGNOSIS — R06.02 SOB (SHORTNESS OF BREATH): ICD-10-CM

## 2019-11-02 LAB
ALBUMIN SERPL BCP-MCNC: 3.2 G/DL (ref 3.5–5.2)
ALLENS TEST: ABNORMAL
ALP SERPL-CCNC: 77 U/L (ref 55–135)
ALT SERPL W/O P-5'-P-CCNC: 33 U/L (ref 10–44)
ANION GAP SERPL CALC-SCNC: 10 MMOL/L (ref 8–16)
AST SERPL-CCNC: 32 U/L (ref 10–40)
BASOPHILS # BLD AUTO: 0.06 K/UL (ref 0–0.2)
BASOPHILS NFR BLD: 0.6 % (ref 0–1.9)
BILIRUB SERPL-MCNC: 0.4 MG/DL (ref 0.1–1)
BNP SERPL-MCNC: 260 PG/ML (ref 0–99)
BUN SERPL-MCNC: 12 MG/DL (ref 8–23)
CALCIUM SERPL-MCNC: 9.1 MG/DL (ref 8.7–10.5)
CHLORIDE SERPL-SCNC: 83 MMOL/L (ref 95–110)
CO2 SERPL-SCNC: 29 MMOL/L (ref 23–29)
CREAT SERPL-MCNC: 0.8 MG/DL (ref 0.5–1.4)
DELSYS: ABNORMAL
DIFFERENTIAL METHOD: ABNORMAL
EOSINOPHIL # BLD AUTO: 1.4 K/UL (ref 0–0.5)
EOSINOPHIL NFR BLD: 13.7 % (ref 0–8)
ERYTHROCYTE [DISTWIDTH] IN BLOOD BY AUTOMATED COUNT: 12.1 % (ref 11.5–14.5)
EST. GFR  (AFRICAN AMERICAN): >60 ML/MIN/1.73 M^2
EST. GFR  (NON AFRICAN AMERICAN): >60 ML/MIN/1.73 M^2
FLOW: 2.5
GLUCOSE SERPL-MCNC: 117 MG/DL (ref 70–110)
HCO3 UR-SCNC: 27 MMOL/L (ref 24–28)
HCT VFR BLD AUTO: 33.6 % (ref 40–54)
HGB BLD-MCNC: 11.2 G/DL (ref 14–18)
IMM GRANULOCYTES # BLD AUTO: 0.07 K/UL (ref 0–0.04)
IMM GRANULOCYTES NFR BLD AUTO: 0.7 % (ref 0–0.5)
LYMPHOCYTES # BLD AUTO: 0.7 K/UL (ref 1–4.8)
LYMPHOCYTES NFR BLD: 6.7 % (ref 18–48)
MAGNESIUM SERPL-MCNC: 2 MG/DL (ref 1.6–2.6)
MCH RBC QN AUTO: 28.8 PG (ref 27–31)
MCHC RBC AUTO-ENTMCNC: 33.3 G/DL (ref 32–36)
MCV RBC AUTO: 86 FL (ref 82–98)
MODE: ABNORMAL
MONOCYTES # BLD AUTO: 1.6 K/UL (ref 0.3–1)
MONOCYTES NFR BLD: 15.4 % (ref 4–15)
NEUTROPHILS # BLD AUTO: 6.6 K/UL (ref 1.8–7.7)
NEUTROPHILS NFR BLD: 62.9 % (ref 38–73)
NRBC BLD-RTO: 0 /100 WBC
PCO2 BLDA: 42.7 MMHG (ref 35–45)
PH SMN: 7.41 [PH] (ref 7.35–7.45)
PLATELET # BLD AUTO: 402 K/UL (ref 150–350)
PMV BLD AUTO: 8 FL (ref 9.2–12.9)
PO2 BLDA: 84 MMHG (ref 80–100)
POC BE: 2 MMOL/L
POC SATURATED O2: 96 % (ref 95–100)
POC TCO2: 28 MMOL/L (ref 23–27)
POTASSIUM SERPL-SCNC: 3.7 MMOL/L (ref 3.5–5.1)
PROT SERPL-MCNC: 6.8 G/DL (ref 6–8.4)
RBC # BLD AUTO: 3.89 M/UL (ref 4.6–6.2)
SAMPLE: ABNORMAL
SITE: ABNORMAL
SODIUM SERPL-SCNC: 122 MMOL/L (ref 136–145)
TROPONIN I SERPL DL<=0.01 NG/ML-MCNC: 0.02 NG/ML (ref 0–0.03)
WBC # BLD AUTO: 10.41 K/UL (ref 3.9–12.7)

## 2019-11-02 PROCEDURE — 36600 WITHDRAWAL OF ARTERIAL BLOOD: CPT

## 2019-11-02 PROCEDURE — 99285 EMERGENCY DEPT VISIT HI MDM: CPT | Mod: 25

## 2019-11-02 PROCEDURE — 84484 ASSAY OF TROPONIN QUANT: CPT

## 2019-11-02 PROCEDURE — 99900035 HC TECH TIME PER 15 MIN (STAT)

## 2019-11-02 PROCEDURE — 93010 EKG 12-LEAD: ICD-10-PCS | Mod: ,,, | Performed by: INTERNAL MEDICINE

## 2019-11-02 PROCEDURE — 83880 ASSAY OF NATRIURETIC PEPTIDE: CPT

## 2019-11-02 PROCEDURE — 94644 CONT INHLJ TX 1ST HOUR: CPT

## 2019-11-02 PROCEDURE — 25000003 PHARM REV CODE 250: Performed by: EMERGENCY MEDICINE

## 2019-11-02 PROCEDURE — 80053 COMPREHEN METABOLIC PANEL: CPT

## 2019-11-02 PROCEDURE — 93010 ELECTROCARDIOGRAM REPORT: CPT | Mod: ,,, | Performed by: INTERNAL MEDICINE

## 2019-11-02 PROCEDURE — 83735 ASSAY OF MAGNESIUM: CPT

## 2019-11-02 PROCEDURE — 25000242 PHARM REV CODE 250 ALT 637 W/ HCPCS: Performed by: EMERGENCY MEDICINE

## 2019-11-02 PROCEDURE — 94761 N-INVAS EAR/PLS OXIMETRY MLT: CPT

## 2019-11-02 PROCEDURE — 93005 ELECTROCARDIOGRAM TRACING: CPT

## 2019-11-02 PROCEDURE — 82803 BLOOD GASES ANY COMBINATION: CPT

## 2019-11-02 PROCEDURE — 85025 COMPLETE CBC W/AUTO DIFF WBC: CPT

## 2019-11-02 PROCEDURE — 94640 AIRWAY INHALATION TREATMENT: CPT

## 2019-11-02 RX ORDER — ALBUTEROL SULFATE 2.5 MG/.5ML
10 SOLUTION RESPIRATORY (INHALATION) CONTINUOUS
Status: DISCONTINUED | OUTPATIENT
Start: 2019-11-02 | End: 2019-11-02 | Stop reason: HOSPADM

## 2019-11-02 RX ORDER — DOXYCYCLINE HYCLATE 100 MG
100 TABLET ORAL
Status: COMPLETED | OUTPATIENT
Start: 2019-11-02 | End: 2019-11-02

## 2019-11-02 RX ORDER — METHYLPREDNISOLONE 4 MG/1
TABLET ORAL
Qty: 1 PACKAGE | Refills: 0 | Status: SHIPPED | OUTPATIENT
Start: 2019-11-02 | End: 2021-12-28 | Stop reason: CLARIF

## 2019-11-02 RX ORDER — DOXYCYCLINE 100 MG/1
100 CAPSULE ORAL 2 TIMES DAILY
Qty: 20 CAPSULE | Refills: 0 | Status: SHIPPED | OUTPATIENT
Start: 2019-11-02 | End: 2019-11-12

## 2019-11-02 RX ADMIN — DOXYCYCLINE HYCLATE 100 MG: 100 TABLET, COATED ORAL at 05:11

## 2019-11-02 RX ADMIN — ALBUTEROL SULFATE 10 MG: 2.5 SOLUTION RESPIRATORY (INHALATION) at 03:11

## 2019-11-02 NOTE — ED PROVIDER NOTES
Encounter Date: 11/2/2019    SCRIBE #1 NOTE: I, Michelle Neri, am scribing for, and in the presence of,  Nathaniel Iraheta MD. I have scribed the following portions of the note - Other sections scribed: HPI, ROS, PE.       History     Chief Complaint   Patient presents with    Shortness of Breath     x several days, worsened tonight; Hx of COPD, uses 2 L O2 NC at home and states it wasn't helping tonight; EMS initial sats 88% on RA, combivent treatment given by EMS and now at 100% on the treatment     CC: SOB    HPI: The patient is a 69 y.o male, with PMHx of COPD, HTN, and PVD, who presents to the ED, per EMS, complaining of SOB for the past 2 days, and worsening. Patient reports of malaise all day. He states that recently started a new breathing treatment, with no relief of symptoms. Per EMS, patient was given DuoNeb and Solu-Medrol. Patient states that SOB was unchanged from normal following administration of medications. He denies productive cough, fever, CP, abdominal pain, emesis, or diarrhea. No Hx of hospitalizations for breathing complications. PSHx of cardiac stent placement. SHx of as a former smoker, with reported cessation 1.5 yrs ago.    The history is provided by the patient, the EMS personnel and the spouse. No  was used.     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 No pertinent family history.  Social History     Tobacco Use    Smoking status: Former Smoker      Packs/day: 2.00     Years: 40.00     Pack years: 80.00     Types: Cigarettes     Start date: 1963     Last attempt to quit: 2018     Years since quittin.5    Smokeless tobacco: Never Used    Tobacco comment: 3/3 ppd x 40 yrs. Currently 3-4 cigarettes daily .He is trying  to quit. Is using a Vapor cigarettes  2-3 x's a day.   Substance Use Topics    Alcohol use: Yes     Alcohol/week: 3.0 standard drinks     Types: 3 Cans of beer per week     Frequency: 2-3 times a week     Comment: beer daily 3-4    Drug use: No     Review of Systems   Constitutional: Negative for chills, diaphoresis and fever.   HENT: Negative for congestion and sore throat.    Eyes: Negative.    Respiratory: Positive for cough, shortness of breath and wheezing. Negative for stridor.    Cardiovascular: Negative for chest pain and palpitations.   Gastrointestinal: Negative for abdominal pain, diarrhea, nausea and vomiting.        NO melena or rectal bleeding   Genitourinary: Negative for dysuria and flank pain.   Musculoskeletal: Negative for back pain.   Skin: Negative for rash.   Neurological: Negative for weakness and headaches.        No numbness   Psychiatric/Behavioral: Negative for confusion.   All other systems reviewed and are negative.      Physical Exam     Initial Vitals [19 0232]   BP Pulse Resp Temp SpO2   (!) 206/86 75 20 98.5 °F (36.9 °C) 100 %      MAP       --         Physical Exam    Nursing note and vitals reviewed.  Constitutional: He appears well-developed and well-nourished. He is not diaphoretic. No distress.   HENT:   Head: Normocephalic and atraumatic.   Nose: Nose normal.   Mouth/Throat: Oropharynx is clear and moist.   Eyes: Conjunctivae and EOM are normal. Pupils are equal, round, and reactive to light. Right eye exhibits no discharge. Left eye exhibits no discharge. No scleral icterus.   Neck: Neck supple. No tracheal deviation present. No JVD present.   Cardiovascular: Normal rate, regular  rhythm and normal heart sounds.   No murmur heard.  Pulmonary/Chest: No stridor. No respiratory distress. He has wheezes. He has rhonchi. He has no rales.   Distant breath sounds bilaterally. Faint rhonchi and wheezing bilaterally. No rales.   Abdominal: Soft. He exhibits no distension. There is no tenderness. There is no rebound and no guarding.   Musculoskeletal: Normal range of motion. He exhibits edema. He exhibits no tenderness.   Trace edema at feet and ankles.    Neurological: He is alert and oriented to person, place, and time. He has normal strength. No cranial nerve deficit.   Skin: Skin is warm and dry. No rash noted.   Psychiatric: He has a normal mood and affect. His behavior is normal. Judgment and thought content normal.         ED Course   Procedures  Labs Reviewed   CBC W/ AUTO DIFFERENTIAL - Abnormal; Notable for the following components:       Result Value    RBC 3.89 (*)     Hemoglobin 11.2 (*)     Hematocrit 33.6 (*)     Platelets 402 (*)     MPV 8.0 (*)     Immature Granulocytes 0.7 (*)     Immature Grans (Abs) 0.07 (*)     Lymph # 0.7 (*)     Mono # 1.6 (*)     Eos # 1.4 (*)     Lymph% 6.7 (*)     Mono% 15.4 (*)     Eosinophil% 13.7 (*)     All other components within normal limits   COMPREHENSIVE METABOLIC PANEL - Abnormal; Notable for the following components:    Sodium 122 (*)     Chloride 83 (*)     Glucose 117 (*)     Albumin 3.2 (*)     All other components within normal limits   B-TYPE NATRIURETIC PEPTIDE - Abnormal; Notable for the following components:     (*)     All other components within normal limits   ISTAT PROCEDURE - Abnormal; Notable for the following components:    POC TCO2 28 (*)     All other components within normal limits   TROPONIN I   MAGNESIUM     EKG Readings: (Independently Interpreted)   Initial Reading: No STEMI. Rhythm: Normal Sinus Rhythm. Heart Rate: 76. Ectopy: No Ectopy. ST Segments: Normal ST Segments. T Waves: Normal. Axis: Left Axis Deviation. Q  Waves: V1 and V2.   Incomplete right bundle branch block.  No significant change from 12/27/2018.       Imaging Results          X-Ray Chest PA And Lateral (Final result)  Result time 11/02/19 04:50:59    Final result by Kelby Mayfield MD (11/02/19 04:50:59)                 Impression:      Radiographic features suggestive of COPD.  No definite radiographic evidence of acute intrathoracic process.      Electronically signed by: Kelby Mayfield MD  Date:    11/02/2019  Time:    04:50             Narrative:    EXAMINATION:  XR CHEST PA AND LATERAL    CLINICAL HISTORY:  Shortness of breath    TECHNIQUE:  PA and lateral views of the chest were performed.    COMPARISON:  02/07/2019    FINDINGS:  Cardiac monitoring leads overlie the chest.  The cardiomediastinal silhouette appears within normal limits.  There are extensive atherosclerotic calcifications.  The lungs appear hyperexpanded with flattening of the diaphragms suggesting COPD.  There are coarsened interstitial markings bilaterally with biapical scarring.  No definite new confluent airspace consolidation identified.  No evidence of significant pleural effusion or pneumothorax.  Surgical clips project over the right axilla.  Visualized osseous structures demonstrate degenerative changes.                                 Medical Decision Making:   History:   Old Medical Records: I decided to obtain old medical records.  Initial Assessment:   Patient presents worsening shortness of breath or today.  With mild URI symptoms. Patient has history of COPD.  Has not been improving with home nebulizations.  Will give 1 hr of albuterol and reassess.  Patient received Solu-Medrol in the ambulance.  Will obtain chest x-ray and lab work.  Differential Diagnosis:   COPD, pneumonia, CHF  Clinical Tests:   Lab Tests: Ordered and Reviewed  The following lab test(s) were unremarkable: CBC, CMP, Troponin and BNP  Radiological Study: Ordered and Reviewed  Medical Tests: Ordered and  Reviewed  ED Management:  0510:  Symptoms improved.  Minimal wheezing on repeat exam.  Blood gas is within normal limits on patient's home oxygen 2.5 L. no evidence of pneumonia on chest x-ray.  No change in lab work except worsening of hyponatremia. H/o this in past. Patient admits to drinking beer, 3-4 daily. No neurologic symptoms. AAOx3. Encouraged to discontinue beer consumption and avoid free water.  Will stop HCTZ. WIll increased salt intake.  Patient stable for outpatient treatment. Will have rechecked in 2 days. Will return if he develops neurologic symptoms.   Patient states he has been taking amoxicillin for 5 days for URI.  Will change to doxycycline.  Will add a Medrol Dosepak.  Discussed plan with patient and family.            Scribe Attestation:   Scribe #1: I performed the above scribed service and the documentation accurately describes the services I performed. I attest to the accuracy of the note.        I, Nathaniel Iraheta MD, personally performed the services described in this documentation. All medical record entries made by the scribe were at my direction and in my presence.  I have reviewed the chart and agree that the record reflects my personal performance and is accurate and complete.          Clinical Impression:       ICD-10-CM ICD-9-CM   1. COPD exacerbation J44.1 491.21   2. SOB (shortness of breath) R06.02 786.05         Disposition:   Disposition: Discharged  Condition: Stable                        Nathaniel Iraheta MD  11/02/19 0512       Nathaniel Iraheta MD  11/02/19 0549

## 2020-10-07 DIAGNOSIS — J43.1 PANACINAR EMPHYSEMA: Primary | ICD-10-CM

## 2020-11-09 ENCOUNTER — HOSPITAL ENCOUNTER (OUTPATIENT)
Dept: RADIOLOGY | Facility: HOSPITAL | Age: 70
Discharge: HOME | End: 2020-11-09
Attending: INTERNAL MEDICINE
Payer: MEDICARE

## 2020-11-09 DIAGNOSIS — J43.1 PANACINAR EMPHYSEMA: ICD-10-CM

## 2020-11-09 PROCEDURE — 71046 XR CHEST PA AND LATERAL: ICD-10-PCS | Mod: 26,,, | Performed by: RADIOLOGY

## 2020-11-09 PROCEDURE — 71046 X-RAY EXAM CHEST 2 VIEWS: CPT | Mod: TC,FY

## 2020-11-09 PROCEDURE — 71046 X-RAY EXAM CHEST 2 VIEWS: CPT | Mod: 26,,, | Performed by: RADIOLOGY

## 2021-04-12 ENCOUNTER — PATIENT MESSAGE (OUTPATIENT)
Dept: RESEARCH | Facility: HOSPITAL | Age: 71
End: 2021-04-12

## 2021-11-29 ENCOUNTER — OFFICE VISIT (OUTPATIENT)
Dept: OTOLARYNGOLOGY | Facility: CLINIC | Age: 71
End: 2021-11-29
Payer: MEDICARE

## 2021-11-29 ENCOUNTER — LAB VISIT (OUTPATIENT)
Dept: LAB | Facility: HOSPITAL | Age: 71
End: 2021-11-29
Attending: NURSE PRACTITIONER
Payer: MEDICARE

## 2021-11-29 VITALS
WEIGHT: 148.06 LBS | BODY MASS INDEX: 22.44 KG/M2 | HEIGHT: 68 IN | SYSTOLIC BLOOD PRESSURE: 124 MMHG | DIASTOLIC BLOOD PRESSURE: 66 MMHG

## 2021-11-29 DIAGNOSIS — R59.0 SUBMENTAL ADENOPATHY: ICD-10-CM

## 2021-11-29 DIAGNOSIS — Z87.891 HISTORY OF SMOKING: ICD-10-CM

## 2021-11-29 DIAGNOSIS — K14.8 MASS OF TONGUE: ICD-10-CM

## 2021-11-29 DIAGNOSIS — K14.8 MASS OF TONGUE: Primary | ICD-10-CM

## 2021-11-29 DIAGNOSIS — K13.21 LEUKOPLAKIA OF TONGUE: ICD-10-CM

## 2021-11-29 LAB
CREAT SERPL-MCNC: 1 MG/DL (ref 0.5–1.4)
EST. GFR  (AFRICAN AMERICAN): >60 ML/MIN/1.73 M^2
EST. GFR  (NON AFRICAN AMERICAN): >60 ML/MIN/1.73 M^2

## 2021-11-29 PROCEDURE — 99204 OFFICE O/P NEW MOD 45 MIN: CPT | Mod: S$GLB,,, | Performed by: NURSE PRACTITIONER

## 2021-11-29 PROCEDURE — 4010F ACE/ARB THERAPY RXD/TAKEN: CPT | Mod: CPTII,S$GLB,, | Performed by: NURSE PRACTITIONER

## 2021-11-29 PROCEDURE — 36415 COLL VENOUS BLD VENIPUNCTURE: CPT | Performed by: NURSE PRACTITIONER

## 2021-11-29 PROCEDURE — 82565 ASSAY OF CREATININE: CPT | Performed by: NURSE PRACTITIONER

## 2021-11-29 PROCEDURE — 99204 PR OFFICE/OUTPT VISIT, NEW, LEVL IV, 45-59 MIN: ICD-10-PCS | Mod: S$GLB,,, | Performed by: NURSE PRACTITIONER

## 2021-11-29 PROCEDURE — 4010F PR ACE/ARB THEARPY RXD/TAKEN: ICD-10-PCS | Mod: CPTII,S$GLB,, | Performed by: NURSE PRACTITIONER

## 2021-11-29 RX ORDER — HYDROCODONE BITARTRATE AND ACETAMINOPHEN 10; 325 MG/1; MG/1
1 TABLET ORAL 4 TIMES DAILY PRN
COMMUNITY
Start: 2021-11-09 | End: 2021-12-29

## 2021-11-29 RX ORDER — NYSTATIN 100000 [USP'U]/ML
SUSPENSION ORAL
COMMUNITY
Start: 2021-11-16 | End: 2021-12-28 | Stop reason: CLARIF

## 2021-12-06 ENCOUNTER — TELEPHONE (OUTPATIENT)
Dept: OTOLARYNGOLOGY | Facility: CLINIC | Age: 71
End: 2021-12-06
Payer: MEDICARE

## 2021-12-06 ENCOUNTER — HOSPITAL ENCOUNTER (OUTPATIENT)
Dept: RADIOLOGY | Facility: HOSPITAL | Age: 71
Discharge: HOME | End: 2021-12-06
Attending: NURSE PRACTITIONER
Payer: MEDICARE

## 2021-12-06 DIAGNOSIS — R59.0 SUBMENTAL ADENOPATHY: ICD-10-CM

## 2021-12-06 DIAGNOSIS — K14.8 MASS OF TONGUE: ICD-10-CM

## 2021-12-06 PROCEDURE — 70491 CT SOFT TISSUE NECK W/DYE: CPT | Mod: TC

## 2021-12-06 PROCEDURE — 70491 CT SOFT TISSUE NECK W/DYE: CPT | Mod: 26,,, | Performed by: RADIOLOGY

## 2021-12-06 PROCEDURE — 70491 CT SOFT TISSUE NECK WITH CONTRAST: ICD-10-PCS | Mod: 26,,, | Performed by: RADIOLOGY

## 2021-12-06 PROCEDURE — 25500020 PHARM REV CODE 255: Performed by: NURSE PRACTITIONER

## 2021-12-06 RX ADMIN — IOHEXOL 75 ML: 350 INJECTION, SOLUTION INTRAVENOUS at 10:12

## 2021-12-13 ENCOUNTER — OFFICE VISIT (OUTPATIENT)
Dept: OTOLARYNGOLOGY | Facility: CLINIC | Age: 71
End: 2021-12-13
Payer: MEDICARE

## 2021-12-13 VITALS — WEIGHT: 138 LBS | BODY MASS INDEX: 20.98 KG/M2

## 2021-12-13 DIAGNOSIS — R59.0 SUBMENTAL ADENOPATHY: ICD-10-CM

## 2021-12-13 DIAGNOSIS — K14.8 MASS OF TONGUE: ICD-10-CM

## 2021-12-13 DIAGNOSIS — C02.9 TONGUE CANCER: ICD-10-CM

## 2021-12-13 PROCEDURE — 88342 CHG IMMUNOCYTOCHEMISTRY: ICD-10-PCS | Mod: 26,,, | Performed by: PATHOLOGY

## 2021-12-13 PROCEDURE — 4010F ACE/ARB THERAPY RXD/TAKEN: CPT | Mod: CPTII,S$GLB,, | Performed by: OTOLARYNGOLOGY

## 2021-12-13 PROCEDURE — 88305 TISSUE EXAM BY PATHOLOGIST: CPT | Mod: 26,,, | Performed by: PATHOLOGY

## 2021-12-13 PROCEDURE — 88342 IMHCHEM/IMCYTCHM 1ST ANTB: CPT | Mod: 26,,, | Performed by: PATHOLOGY

## 2021-12-13 PROCEDURE — 99999 PR PBB SHADOW E&M-EST. PATIENT-LVL III: ICD-10-PCS | Mod: PBBFAC,,, | Performed by: OTOLARYNGOLOGY

## 2021-12-13 PROCEDURE — 99213 PR OFFICE/OUTPT VISIT, EST, LEVL III, 20-29 MIN: ICD-10-PCS | Mod: S$GLB,,, | Performed by: OTOLARYNGOLOGY

## 2021-12-13 PROCEDURE — 88342 IMHCHEM/IMCYTCHM 1ST ANTB: CPT | Performed by: PATHOLOGY

## 2021-12-13 PROCEDURE — 88305 TISSUE EXAM BY PATHOLOGIST: ICD-10-PCS | Mod: 26,,, | Performed by: PATHOLOGY

## 2021-12-13 PROCEDURE — 99213 OFFICE O/P EST LOW 20 MIN: CPT | Mod: S$GLB,,, | Performed by: OTOLARYNGOLOGY

## 2021-12-13 PROCEDURE — 4010F PR ACE/ARB THEARPY RXD/TAKEN: ICD-10-PCS | Mod: CPTII,S$GLB,, | Performed by: OTOLARYNGOLOGY

## 2021-12-13 PROCEDURE — 88173 PR  INTERPRETATION OF FNA SMEAR: ICD-10-PCS | Mod: 26,,, | Performed by: PATHOLOGY

## 2021-12-13 PROCEDURE — 88173 CYTOPATH EVAL FNA REPORT: CPT | Performed by: PATHOLOGY

## 2021-12-13 PROCEDURE — 88173 CYTOPATH EVAL FNA REPORT: CPT | Mod: 26,,, | Performed by: PATHOLOGY

## 2021-12-13 PROCEDURE — 99999 PR PBB SHADOW E&M-EST. PATIENT-LVL III: CPT | Mod: PBBFAC,,, | Performed by: OTOLARYNGOLOGY

## 2021-12-13 PROCEDURE — 10021 FNA BX W/O IMG GDN 1ST LES: CPT | Mod: ,,, | Performed by: PATHOLOGY

## 2021-12-13 PROCEDURE — 10021 PR FINE NEEDLE ASP BIOPSY, W/O IMAGING GUIDANCE, 1ST LESION: ICD-10-PCS | Mod: ,,, | Performed by: PATHOLOGY

## 2021-12-13 PROCEDURE — 88305 TISSUE EXAM BY PATHOLOGIST: CPT | Performed by: PATHOLOGY

## 2021-12-13 RX ORDER — ONDANSETRON 4 MG/1
4 TABLET, ORALLY DISINTEGRATING ORAL ONCE
Qty: 12 TABLET | Refills: 0 | Status: SHIPPED | OUTPATIENT
Start: 2021-12-13 | End: 2021-12-13

## 2021-12-15 PROBLEM — K14.8 MASS OF TONGUE: Status: ACTIVE | Noted: 2021-12-15

## 2021-12-15 PROBLEM — R59.0 SUBMENTAL ADENOPATHY: Status: ACTIVE | Noted: 2021-12-15

## 2021-12-16 ENCOUNTER — HOSPITAL ENCOUNTER (OUTPATIENT)
Dept: RADIOLOGY | Facility: HOSPITAL | Age: 71
Discharge: HOME | End: 2021-12-16
Attending: OTOLARYNGOLOGY
Payer: MEDICARE

## 2021-12-16 DIAGNOSIS — K14.8 MASS OF TONGUE: ICD-10-CM

## 2021-12-16 PROBLEM — C02.9 SQUAMOUS CELL CANCER OF TONGUE: Status: ACTIVE | Noted: 2021-12-16

## 2021-12-16 LAB
FINAL PATHOLOGIC DIAGNOSIS: ABNORMAL
Lab: ABNORMAL
SUPPLEMENTAL DIAGNOSIS: ABNORMAL

## 2021-12-16 PROCEDURE — 71250 CT THORAX DX C-: CPT | Mod: 26,,, | Performed by: RADIOLOGY

## 2021-12-16 PROCEDURE — 71250 CT THORAX DX C-: CPT | Mod: TC

## 2021-12-16 PROCEDURE — 71250 CT CHEST WITHOUT CONTRAST: ICD-10-PCS | Mod: 26,,, | Performed by: RADIOLOGY

## 2021-12-18 RX ORDER — SODIUM CHLORIDE 0.9 % (FLUSH) 0.9 %
10 SYRINGE (ML) INJECTION
Status: CANCELLED | OUTPATIENT
Start: 2021-12-18

## 2021-12-18 RX ORDER — LIDOCAINE HYDROCHLORIDE 10 MG/ML
1 INJECTION, SOLUTION EPIDURAL; INFILTRATION; INTRACAUDAL; PERINEURAL ONCE
Status: CANCELLED | OUTPATIENT
Start: 2021-12-18 | End: 2021-12-18

## 2021-12-28 ENCOUNTER — TELEPHONE (OUTPATIENT)
Dept: PREADMISSION TESTING | Facility: HOSPITAL | Age: 71
End: 2021-12-28
Payer: MEDICARE

## 2021-12-28 DIAGNOSIS — Z01.818 PRE-OP TESTING: Primary | ICD-10-CM

## 2021-12-28 RX ORDER — METOPROLOL SUCCINATE 200 MG/1
200 TABLET, EXTENDED RELEASE ORAL 2 TIMES DAILY
Status: ON HOLD | COMMUNITY
End: 2022-01-19 | Stop reason: HOSPADM

## 2021-12-28 RX ORDER — UMECLIDINIUM BROMIDE AND VILANTEROL TRIFENATATE 62.5; 25 UG/1; UG/1
1 POWDER RESPIRATORY (INHALATION) DAILY
Status: ON HOLD | COMMUNITY
End: 2022-01-19 | Stop reason: HOSPADM

## 2021-12-28 RX ORDER — ASPIRIN 325 MG
50 TABLET, DELAYED RELEASE (ENTERIC COATED) ORAL DAILY
Status: ON HOLD | COMMUNITY
End: 2022-01-19 | Stop reason: HOSPADM

## 2021-12-28 RX ORDER — ONDANSETRON 4 MG/1
4 TABLET, ORALLY DISINTEGRATING ORAL
Status: ON HOLD | COMMUNITY
End: 2022-01-19 | Stop reason: HOSPADM

## 2021-12-28 RX ORDER — HYDROCHLOROTHIAZIDE 25 MG/1
25 TABLET ORAL DAILY
COMMUNITY
End: 2021-12-29

## 2021-12-28 RX ORDER — CLONIDINE HYDROCHLORIDE 0.1 MG/1
0.1 TABLET ORAL 2 TIMES DAILY
COMMUNITY
End: 2021-12-29

## 2021-12-28 NOTE — ANESTHESIA PAT ROS NOTE
12/28/2021  Fareed Richard Jr. is a 71 y.o., male.      Pre-op Assessment          Review of Systems  Anesthesia Hx:  Denies Hx of Anesthetic complications  History of prior surgery of interest to airway management or planning: Previous anesthesia: General BILATERAL FEMORAL STENTS, LEFT ARE PSUEDOANEURYSM RUPTURE REPAIR ~2017 with general anesthesia.  Denies Family Hx of Anesthesia complications.   Denies Personal Hx of Anesthesia complications.   Social:  Former Smoker, Alcohol Use 2-3 BEERS/DAY   Hematology/Oncology:  Hematology Normal      Current/Recent Cancer. --  Cancer in past history:  surgery  Oncology Comments: S/P SURGICAL REMOVAL OF CHEEK TUMOR WITH FLAP FROM NECK. S/P SURGICAL REMOVAL OF RIGHT FOREARM SKIN CANCER WITH SKIN GRAFT FROM LEFT THIGH. S/P LIP TUMOR REMOVAL.     EENT/Dental:   MASS OF TONGUE. EDENTULOUS.   Cardiovascular:   Hypertension  Denies Angina. hyperlipidemia S/P SURGICAL REPAIR OF PSEUDOANEURYSM OF LEFT ARM 2017. Functional Capacity 3 METS  Coronary Artery Disease: S/P Percutaneous Coronary Intervention (PCI) coronary stent, unknown type, currently on aspirin, currently on clopidogrel (Plavix).  Peripheral Arterial Disease MESENTERIC ARTERY STENOSIS. S/P BILATERAL FEMORAL STENTS.  Carotoid Artery Disease (S/P CAROTID STENT), s/p percutaneous intervention    Pulmonary:   COPD Denies Shortness of breath.  Denies Recent URI.    Renal/:  Renal/ Normal     Hepatic/GI:  Hepatic/GI Normal    Musculoskeletal:  Musculoskeletal Normal    Neurological:  Neurology Normal    Endocrine:  Endocrine Normal    Psych:  Psychiatric Normal              Anesthesia Assessment: Preoperative EQUATION    Planned Procedure: Procedure(s) (LRB):  GLOSSECTOMY, PARTIAL (Left)  DISSECTION, NECK, RADICAL (Left)  CREATION, FREE FLAP (N/A)  Requested Anesthesia Type:General  Surgeon: Naeem Smalls,  MD  Service: ENT  Known or anticipated Date of Surgery:1/3/2022    Surgeon notes: reviewed    Electronic QUestionnaire Assessment completed via nurse interview with patient.        Triage considerations:     The patient has no apparent active cardiac condition (No unstable coronary Syndrome such as severe unstable angina or recent [<1 month] myocardial infarction, decompensated CHF, severe valvular   disease or significant arrhythmia)    Previous anesthesia records:Not available  *PATIENT REPORTS SMALL MOUTH/LIMITED MOUTH OPENING SECONDARY TO LIP TUMOR REMOVAL*    Last PCP note: within 3 months , outside [facility]   Subspecialty notes: ENT, OUTSIDE CARDIOLOGY NOTE & VASCULAR SURGERY NOTE REQUESTED-WILL SCAN TO MEDIA    Other important co-morbidities: COPD, HLD, HTN and PERIPHERAL VASCULAR DISEASE S/P FEMORAL, CAROTID, & CARDIAC STENTS      Tests already available:  Available tests,  > 1 year ago , within [facility] .   11/2/2019:  EKG             Instructions given. (See in Nurse's note)    Optimization:  Anesthesia Preop Clinic Assessment  Indicated - NO AVAILABLE APPOINTMENTS, TO BE SEEN BY ANESTHESIA MORNING OF SURGERY    Medical Opinion Indicated       Sub-specialist consult indicated:   [facility] CARDIOLOGY WITH MEDICATION INSTRUCTIONS - DR. DOWLING 12/29      Plan:    Testing:  BMP, EKG, Hematology Profile and T&S   Pre-anesthesia  visit       Visit focus: concerns in complex and/or prolonged anesthesia, airway concerns     Consultation:CARDIOLOGY FOR STATEMENT OF OPTIMIZATION     Patient  has previously scheduled Medical Appointment: 12/30-DR. SIEGEL    Navigation: Tests Scheduled.              Consults scheduled.             Results will be tracked by Preop Clinic.     Pre-operative cardiovascular examination  The patient has an anticipated complex head and neck surgery that will require anesthesia for an extended period of time. He has a complex vascular history. His CAD and PAD are stable at this time. He also has  "significant COPD. The patient has 1 RF by RCRI criteria (MI), but I believe his complex surgery, atherosclerotic disease burden, and COPD likely place him at a higher risk for inez-operative CV events than suggested.     That being said he is medically optimized at this time with stable symptoms. A pre-operative stress test would not change our course of management as his SCC is metastatic and clearly his greatest morbidity at this time. I will order a pre-operative TTE to insure normal LVEF and help guide anesthesia for volume purposes inez-operatively.     Okay to hold asa/plavix (asa already stopped, okay to stop clopidogrel today). Can restart post-op when safe from a surgical perspective.   Edison Whiteside MD    Echo 12/30/21  Summary    · The left ventricle is normal in size with mild concentric hypertrophy and normal systolic function.  · The estimated ejection fraction is 65%.  · Normal left ventricular diastolic function.  · Normal right ventricular size with normal right ventricular systolic function.  · Normal central venous pressure (3 mmHg).  · The sinuses of Valsalva is dilated and measures 4.1 cm.    12/30/21 HEME PROFILE: WBC 17.46. NOTIFIED SURGEON VIA [employer]. SPOKE WITH PATIENT @ 1630, DENIES SIGNS/SYMPTOMS OF INFECTION BUT DOES REPORT DIARRHEA SINCE 12/29 EVENING TO WHICH HE ATTRIBUTED TO INCREASE ENSURE INTAKE.    PER DR. SIEGEL: "If he has no overt signs of infection on Monday, we will proceed"    "

## 2021-12-28 NOTE — PRE-PROCEDURE INSTRUCTIONS
Pre-op instructions given. Patient verbalized understanding.   Plavix/aspirin instructions per PCP.

## 2021-12-29 ENCOUNTER — OFFICE VISIT (OUTPATIENT)
Dept: CARDIOLOGY | Facility: CLINIC | Age: 71
End: 2021-12-29
Payer: MEDICARE

## 2021-12-29 VITALS
HEIGHT: 68 IN | HEART RATE: 74 BPM | SYSTOLIC BLOOD PRESSURE: 106 MMHG | BODY MASS INDEX: 21.66 KG/M2 | DIASTOLIC BLOOD PRESSURE: 52 MMHG | WEIGHT: 142.94 LBS

## 2021-12-29 DIAGNOSIS — Z01.810 PRE-OPERATIVE CARDIOVASCULAR EXAMINATION: ICD-10-CM

## 2021-12-29 DIAGNOSIS — I10 PRIMARY HYPERTENSION: ICD-10-CM

## 2021-12-29 DIAGNOSIS — Z01.818 PRE-OP TESTING: ICD-10-CM

## 2021-12-29 DIAGNOSIS — I25.10 CORONARY ARTERY DISEASE INVOLVING NATIVE CORONARY ARTERY OF NATIVE HEART WITHOUT ANGINA PECTORIS: ICD-10-CM

## 2021-12-29 DIAGNOSIS — I73.9 PERIPHERAL VASCULAR DISEASE: ICD-10-CM

## 2021-12-29 DIAGNOSIS — E78.49 OTHER HYPERLIPIDEMIA: ICD-10-CM

## 2021-12-29 PROCEDURE — 1101F PR PT FALLS ASSESS DOC 0-1 FALLS W/OUT INJ PAST YR: ICD-10-PCS | Mod: CPTII,S$GLB,, | Performed by: INTERNAL MEDICINE

## 2021-12-29 PROCEDURE — 4010F ACE/ARB THERAPY RXD/TAKEN: CPT | Mod: CPTII,S$GLB,, | Performed by: INTERNAL MEDICINE

## 2021-12-29 PROCEDURE — 1160F RVW MEDS BY RX/DR IN RCRD: CPT | Mod: CPTII,S$GLB,, | Performed by: INTERNAL MEDICINE

## 2021-12-29 PROCEDURE — 99999 PR PBB SHADOW E&M-EST. PATIENT-LVL III: CPT | Mod: PBBFAC,,, | Performed by: INTERNAL MEDICINE

## 2021-12-29 PROCEDURE — 1159F MED LIST DOCD IN RCRD: CPT | Mod: CPTII,S$GLB,, | Performed by: INTERNAL MEDICINE

## 2021-12-29 PROCEDURE — 3288F PR FALLS RISK ASSESSMENT DOCUMENTED: ICD-10-PCS | Mod: CPTII,S$GLB,, | Performed by: INTERNAL MEDICINE

## 2021-12-29 PROCEDURE — 3078F DIAST BP <80 MM HG: CPT | Mod: CPTII,S$GLB,, | Performed by: INTERNAL MEDICINE

## 2021-12-29 PROCEDURE — 1101F PT FALLS ASSESS-DOCD LE1/YR: CPT | Mod: CPTII,S$GLB,, | Performed by: INTERNAL MEDICINE

## 2021-12-29 PROCEDURE — 3008F PR BODY MASS INDEX (BMI) DOCUMENTED: ICD-10-PCS | Mod: CPTII,S$GLB,, | Performed by: INTERNAL MEDICINE

## 2021-12-29 PROCEDURE — 3078F PR MOST RECENT DIASTOLIC BLOOD PRESSURE < 80 MM HG: ICD-10-PCS | Mod: CPTII,S$GLB,, | Performed by: INTERNAL MEDICINE

## 2021-12-29 PROCEDURE — 1125F PR PAIN SEVERITY QUANTIFIED, PAIN PRESENT: ICD-10-PCS | Mod: CPTII,S$GLB,, | Performed by: INTERNAL MEDICINE

## 2021-12-29 PROCEDURE — 4010F PR ACE/ARB THEARPY RXD/TAKEN: ICD-10-PCS | Mod: CPTII,S$GLB,, | Performed by: INTERNAL MEDICINE

## 2021-12-29 PROCEDURE — 3288F FALL RISK ASSESSMENT DOCD: CPT | Mod: CPTII,S$GLB,, | Performed by: INTERNAL MEDICINE

## 2021-12-29 PROCEDURE — 1160F PR REVIEW ALL MEDS BY PRESCRIBER/CLIN PHARMACIST DOCUMENTED: ICD-10-PCS | Mod: CPTII,S$GLB,, | Performed by: INTERNAL MEDICINE

## 2021-12-29 PROCEDURE — 99999 PR PBB SHADOW E&M-EST. PATIENT-LVL III: ICD-10-PCS | Mod: PBBFAC,,, | Performed by: INTERNAL MEDICINE

## 2021-12-29 PROCEDURE — 99205 OFFICE O/P NEW HI 60 MIN: CPT | Mod: S$GLB,,, | Performed by: INTERNAL MEDICINE

## 2021-12-29 PROCEDURE — 93000 EKG 12-LEAD: ICD-10-PCS | Mod: S$GLB,,, | Performed by: INTERNAL MEDICINE

## 2021-12-29 PROCEDURE — 93000 ELECTROCARDIOGRAM COMPLETE: CPT | Mod: S$GLB,,, | Performed by: INTERNAL MEDICINE

## 2021-12-29 PROCEDURE — 1159F PR MEDICATION LIST DOCUMENTED IN MEDICAL RECORD: ICD-10-PCS | Mod: CPTII,S$GLB,, | Performed by: INTERNAL MEDICINE

## 2021-12-29 PROCEDURE — 1125F AMNT PAIN NOTED PAIN PRSNT: CPT | Mod: CPTII,S$GLB,, | Performed by: INTERNAL MEDICINE

## 2021-12-29 PROCEDURE — 3008F BODY MASS INDEX DOCD: CPT | Mod: CPTII,S$GLB,, | Performed by: INTERNAL MEDICINE

## 2021-12-29 PROCEDURE — 99205 PR OFFICE/OUTPT VISIT, NEW, LEVL V, 60-74 MIN: ICD-10-PCS | Mod: S$GLB,,, | Performed by: INTERNAL MEDICINE

## 2021-12-29 PROCEDURE — 3074F PR MOST RECENT SYSTOLIC BLOOD PRESSURE < 130 MM HG: ICD-10-PCS | Mod: CPTII,S$GLB,, | Performed by: INTERNAL MEDICINE

## 2021-12-29 PROCEDURE — 3074F SYST BP LT 130 MM HG: CPT | Mod: CPTII,S$GLB,, | Performed by: INTERNAL MEDICINE

## 2021-12-30 ENCOUNTER — OFFICE VISIT (OUTPATIENT)
Dept: OTOLARYNGOLOGY | Facility: CLINIC | Age: 71
End: 2021-12-30
Payer: MEDICARE

## 2021-12-30 ENCOUNTER — HOSPITAL ENCOUNTER (OUTPATIENT)
Dept: CARDIOLOGY | Facility: HOSPITAL | Age: 71
Discharge: HOME | End: 2021-12-30
Attending: INTERNAL MEDICINE
Payer: MEDICARE

## 2021-12-30 VITALS
WEIGHT: 143.75 LBS | SYSTOLIC BLOOD PRESSURE: 103 MMHG | BODY MASS INDEX: 21.86 KG/M2 | DIASTOLIC BLOOD PRESSURE: 65 MMHG | HEART RATE: 85 BPM

## 2021-12-30 VITALS
HEART RATE: 76 BPM | HEIGHT: 68 IN | SYSTOLIC BLOOD PRESSURE: 106 MMHG | BODY MASS INDEX: 21.52 KG/M2 | WEIGHT: 142 LBS | DIASTOLIC BLOOD PRESSURE: 52 MMHG

## 2021-12-30 DIAGNOSIS — I25.10 CORONARY ARTERY DISEASE INVOLVING NATIVE CORONARY ARTERY OF NATIVE HEART WITHOUT ANGINA PECTORIS: ICD-10-CM

## 2021-12-30 DIAGNOSIS — C02.9 SQUAMOUS CELL CANCER OF TONGUE: Primary | ICD-10-CM

## 2021-12-30 LAB
ASCENDING AORTA: 3.8 CM
AV INDEX (PROSTH): 0.8
AV MEAN GRADIENT: 4 MMHG
AV PEAK GRADIENT: 6 MMHG
AV VALVE AREA: 3.2 CM2
AV VELOCITY RATIO: 0.89
BSA FOR ECHO PROCEDURE: 1.76 M2
CV ECHO LV RWT: 0.47 CM
DOP CALC AO PEAK VEL: 1.22 M/S
DOP CALC AO VTI: 27.1 CM
DOP CALC LVOT AREA: 4 CM2
DOP CALC LVOT DIAMETER: 2.26 CM
DOP CALC LVOT PEAK VEL: 1.08 M/S
DOP CALC LVOT STROKE VOLUME: 86.64 CM3
DOP CALCLVOT PEAK VEL VTI: 21.61 CM
E WAVE DECELERATION TIME: 242.99 MSEC
E/A RATIO: 0.7
E/E' RATIO: 8.5 M/S
ECHO LV POSTERIOR WALL: 0.97 CM (ref 0.6–1.1)
EJECTION FRACTION: 65 %
FRACTIONAL SHORTENING: 32 % (ref 28–44)
INTERVENTRICULAR SEPTUM: 1.05 CM (ref 0.6–1.1)
LA MAJOR: 4.59 CM
LA MINOR: 5.22 CM
LA WIDTH: 3.43 CM
LEFT ATRIUM SIZE: 3.41 CM
LEFT ATRIUM VOLUME INDEX MOD: 26.9 ML/M2
LEFT ATRIUM VOLUME INDEX: 27.4 ML/M2
LEFT ATRIUM VOLUME MOD: 47.69 CM3
LEFT ATRIUM VOLUME: 48.56 CM3
LEFT INTERNAL DIMENSION IN SYSTOLE: 2.83 CM (ref 2.1–4)
LEFT VENTRICLE DIASTOLIC VOLUME INDEX: 43.66 ML/M2
LEFT VENTRICLE DIASTOLIC VOLUME: 77.27 ML
LEFT VENTRICLE MASS INDEX: 78 G/M2
LEFT VENTRICLE SYSTOLIC VOLUME INDEX: 17.1 ML/M2
LEFT VENTRICLE SYSTOLIC VOLUME: 30.33 ML
LEFT VENTRICULAR INTERNAL DIMENSION IN DIASTOLE: 4.17 CM (ref 3.5–6)
LEFT VENTRICULAR MASS: 137.6 G
LV LATERAL E/E' RATIO: 8.5 M/S
LV SEPTAL E/E' RATIO: 8.5 M/S
MV PEAK A VEL: 0.73 M/S
MV PEAK E VEL: 0.51 M/S
MV STENOSIS PRESSURE HALF TIME: 70.47 MS
MV VALVE AREA P 1/2 METHOD: 3.12 CM2
RA MAJOR: 4.89 CM
RA PRESSURE: 3 MMHG
RA WIDTH: 3.45 CM
RIGHT VENTRICULAR END-DIASTOLIC DIMENSION: 4.31 CM
SINUS: 4.1 CM
STJ: 2.86 CM
TDI LATERAL: 0.06 M/S
TDI SEPTAL: 0.06 M/S
TDI: 0.06 M/S
TRICUSPID ANNULAR PLANE SYSTOLIC EXCURSION: 2.14 CM

## 2021-12-30 PROCEDURE — 99213 OFFICE O/P EST LOW 20 MIN: CPT | Mod: S$GLB,,, | Performed by: OTOLARYNGOLOGY

## 2021-12-30 PROCEDURE — 3078F PR MOST RECENT DIASTOLIC BLOOD PRESSURE < 80 MM HG: ICD-10-PCS | Mod: CPTII,S$GLB,, | Performed by: OTOLARYNGOLOGY

## 2021-12-30 PROCEDURE — 3074F PR MOST RECENT SYSTOLIC BLOOD PRESSURE < 130 MM HG: ICD-10-PCS | Mod: CPTII,S$GLB,, | Performed by: OTOLARYNGOLOGY

## 2021-12-30 PROCEDURE — 1159F MED LIST DOCD IN RCRD: CPT | Mod: CPTII,S$GLB,, | Performed by: OTOLARYNGOLOGY

## 2021-12-30 PROCEDURE — 3074F SYST BP LT 130 MM HG: CPT | Mod: CPTII,S$GLB,, | Performed by: OTOLARYNGOLOGY

## 2021-12-30 PROCEDURE — 3288F PR FALLS RISK ASSESSMENT DOCUMENTED: ICD-10-PCS | Mod: CPTII,S$GLB,, | Performed by: OTOLARYNGOLOGY

## 2021-12-30 PROCEDURE — 3008F BODY MASS INDEX DOCD: CPT | Mod: CPTII,S$GLB,, | Performed by: OTOLARYNGOLOGY

## 2021-12-30 PROCEDURE — 93306 TTE W/DOPPLER COMPLETE: CPT

## 2021-12-30 PROCEDURE — 3008F PR BODY MASS INDEX (BMI) DOCUMENTED: ICD-10-PCS | Mod: CPTII,S$GLB,, | Performed by: OTOLARYNGOLOGY

## 2021-12-30 PROCEDURE — 99999 PR PBB SHADOW E&M-EST. PATIENT-LVL III: ICD-10-PCS | Mod: PBBFAC,,, | Performed by: OTOLARYNGOLOGY

## 2021-12-30 PROCEDURE — 3078F DIAST BP <80 MM HG: CPT | Mod: CPTII,S$GLB,, | Performed by: OTOLARYNGOLOGY

## 2021-12-30 PROCEDURE — 1126F AMNT PAIN NOTED NONE PRSNT: CPT | Mod: CPTII,S$GLB,, | Performed by: OTOLARYNGOLOGY

## 2021-12-30 PROCEDURE — 93306 ECHO (CUPID ONLY): ICD-10-PCS | Mod: 26,,, | Performed by: INTERNAL MEDICINE

## 2021-12-30 PROCEDURE — 1159F PR MEDICATION LIST DOCUMENTED IN MEDICAL RECORD: ICD-10-PCS | Mod: CPTII,S$GLB,, | Performed by: OTOLARYNGOLOGY

## 2021-12-30 PROCEDURE — 1126F PR PAIN SEVERITY QUANTIFIED, NO PAIN PRESENT: ICD-10-PCS | Mod: CPTII,S$GLB,, | Performed by: OTOLARYNGOLOGY

## 2021-12-30 PROCEDURE — 1160F RVW MEDS BY RX/DR IN RCRD: CPT | Mod: CPTII,S$GLB,, | Performed by: OTOLARYNGOLOGY

## 2021-12-30 PROCEDURE — 1101F PR PT FALLS ASSESS DOC 0-1 FALLS W/OUT INJ PAST YR: ICD-10-PCS | Mod: CPTII,S$GLB,, | Performed by: OTOLARYNGOLOGY

## 2021-12-30 PROCEDURE — 99999 PR PBB SHADOW E&M-EST. PATIENT-LVL III: CPT | Mod: PBBFAC,,, | Performed by: OTOLARYNGOLOGY

## 2021-12-30 PROCEDURE — 93306 TTE W/DOPPLER COMPLETE: CPT | Mod: 26,,, | Performed by: INTERNAL MEDICINE

## 2021-12-30 PROCEDURE — 3288F FALL RISK ASSESSMENT DOCD: CPT | Mod: CPTII,S$GLB,, | Performed by: OTOLARYNGOLOGY

## 2021-12-30 PROCEDURE — 99213 PR OFFICE/OUTPT VISIT, EST, LEVL III, 20-29 MIN: ICD-10-PCS | Mod: S$GLB,,, | Performed by: OTOLARYNGOLOGY

## 2021-12-30 PROCEDURE — 1101F PT FALLS ASSESS-DOCD LE1/YR: CPT | Mod: CPTII,S$GLB,, | Performed by: OTOLARYNGOLOGY

## 2021-12-30 PROCEDURE — 1160F PR REVIEW ALL MEDS BY PRESCRIBER/CLIN PHARMACIST DOCUMENTED: ICD-10-PCS | Mod: CPTII,S$GLB,, | Performed by: OTOLARYNGOLOGY

## 2021-12-31 ENCOUNTER — LAB VISIT (OUTPATIENT)
Dept: PRIMARY CARE CLINIC | Facility: CLINIC | Age: 71
DRG: 011 | End: 2021-12-31
Payer: MEDICARE

## 2021-12-31 DIAGNOSIS — Z01.818 PRE-OP TESTING: ICD-10-CM

## 2021-12-31 PROCEDURE — U0003 INFECTIOUS AGENT DETECTION BY NUCLEIC ACID (DNA OR RNA); SEVERE ACUTE RESPIRATORY SYNDROME CORONAVIRUS 2 (SARS-COV-2) (CORONAVIRUS DISEASE [COVID-19]), AMPLIFIED PROBE TECHNIQUE, MAKING USE OF HIGH THROUGHPUT TECHNOLOGIES AS DESCRIBED BY CMS-2020-01-R: HCPCS | Performed by: OTOLARYNGOLOGY

## 2022-01-02 NOTE — H&P (VIEW-ONLY)
Chief Complaint   Patient presents with    discuss surgery       HPI   71 y.o. male presents for evaluation of a several month history of a left-sided tongue lesion and left-sided cervical adenopathy.  He reports significant, worsening pain in his mouth.  He has lost significant amount of weight due to this pain and his difficulty eating.  He has an extensive smoking history but quit several years ago.    Recent fine-needle aspiration his neck mass revealed squamous cell carcinoma.  He is scheduled for partial glossectomy, neck dissection, trach and free flap reconstruction on Monday.  No new complaints.  Imaging revealed his known tongue tumor and left-sided cervical adenopathy.  He was also noted to have a pretracheal lymph node in the mediastinum.  His scans were reviewed in tumor Board and was recommended that we proceed with surgery and followed this lesion.    Review of Systems   Constitutional: Negative for fatigue and unexpected weight change.   HENT: Per HPI.  Eyes: Negative for visual disturbance.   Respiratory: Negative for shortness of breath, hemoptysis   Cardiovascular: Negative for chest pain and palpitations.   Musculoskeletal: Negative for decreased ROM, back pain.   Skin: Negative for rash, sunburn, itching.   Neurological: Negative for dizziness and seizures.   Hematological: Negative for adenopathy. Does not bruise/bleed easily.   Endocrine: Negative for rapid weight loss/weight gain, heat/cold intolerance.     Past Medical History   Patient Active Problem List   Diagnosis    Peripheral vascular disease    Carotid stenosis    Mass of tongue    Submental adenopathy    Squamous cell cancer of tongue    Coronary artery disease involving native coronary artery of native heart without angina pectoris    Primary hypertension    Other hyperlipidemia    Pre-operative cardiovascular examination           Past Surgical History   Past Surgical History:   Procedure Laterality Date    ABDOMINAL  SURGERY      stents placed in liver and large intestines, per patient    CAROTID STENT      CORONARY STENT PLACEMENT  01/2000    EYE SURGERY      Cataract bilateral    femoral stents      bilateral    SKIN CANCER EXCISION           Family History   No family history on file.        Social History   .  Social History     Socioeconomic History    Marital status:    Tobacco Use    Smoking status: Former Smoker     Packs/day: 2.00     Years: 40.00     Pack years: 80.00     Types: Cigarettes     Start date: 4/17/1963     Quit date: 4/17/2018     Years since quitting: 3.7    Smokeless tobacco: Never Used    Tobacco comment: 3/3 ppd x 40 yrs. Currently 3-4 cigarettes daily .He is trying  to quit. Is using a Vapor cigarettes  2-3 x's a day.   Substance and Sexual Activity    Alcohol use: Yes     Alcohol/week: 3.0 standard drinks     Types: 3 Cans of beer per week     Comment: beer daily 3-4    Drug use: No    Sexual activity: Not Currently         Allergies   Review of patient's allergies indicates:  No Known Allergies        Physical Exam     Vitals:    12/30/21 1421   BP: 103/65   Pulse: 85         Body mass index is 21.86 kg/m².      General: AOx3, NAD   Respiratory:  Symmetric chest rise, normal effort  Oral Cavity:  Oral Tongue mobile.  Roughly 3.5 cm ulcerated lesion of the left anterior/lateral tongue.  There are several mm palpable induration surrounding it.. Hard Palate WNL. No buccal or FOM lesions.  Oropharynx:  No masses/lesions of the posterior pharyngeal wall. Tonsillar fossa without lesions. Soft palate without masses. Midline uvula.   Neck: No scars.  Roughly 3 cm firm, minimally mobile left submental/submandibular adenopathy.  No thyromegaly or thyroid nodules.  Normal range of motion.    Face: House Brackmann I bilaterally.     CT neck, chest reviewed.    Assessment/Plan  Problem List Items Addressed This Visit        Oncology    Squamous cell cancer of tongue - Primary     [id number] flaps  are carcinoma your cough.  For the operating room for for when in lateral over the bilateral neck dissection depending on her upper lip examination of his tumor.  He will also require free flap reconstruction likely with an anterolateral thigh free flap, tracheostomy and PEG tube placement.  Surgery is scheduled on January 3, 2022.

## 2022-01-02 NOTE — PROGRESS NOTES
Chief Complaint   Patient presents with    discuss surgery       HPI   71 y.o. male presents for evaluation of a several month history of a left-sided tongue lesion and left-sided cervical adenopathy.  He reports significant, worsening pain in his mouth.  He has lost significant amount of weight due to this pain and his difficulty eating.  He has an extensive smoking history but quit several years ago.    Recent fine-needle aspiration his neck mass revealed squamous cell carcinoma.  He is scheduled for partial glossectomy, neck dissection, trach and free flap reconstruction on Monday.  No new complaints.  Imaging revealed his known tongue tumor and left-sided cervical adenopathy.  He was also noted to have a pretracheal lymph node in the mediastinum.  His scans were reviewed in tumor Board and was recommended that we proceed with surgery and followed this lesion.    Review of Systems   Constitutional: Negative for fatigue and unexpected weight change.   HENT: Per HPI.  Eyes: Negative for visual disturbance.   Respiratory: Negative for shortness of breath, hemoptysis   Cardiovascular: Negative for chest pain and palpitations.   Musculoskeletal: Negative for decreased ROM, back pain.   Skin: Negative for rash, sunburn, itching.   Neurological: Negative for dizziness and seizures.   Hematological: Negative for adenopathy. Does not bruise/bleed easily.   Endocrine: Negative for rapid weight loss/weight gain, heat/cold intolerance.     Past Medical History   Patient Active Problem List   Diagnosis    Peripheral vascular disease    Carotid stenosis    Mass of tongue    Submental adenopathy    Squamous cell cancer of tongue    Coronary artery disease involving native coronary artery of native heart without angina pectoris    Primary hypertension    Other hyperlipidemia    Pre-operative cardiovascular examination           Past Surgical History   Past Surgical History:   Procedure Laterality Date    ABDOMINAL  SURGERY      stents placed in liver and large intestines, per patient    CAROTID STENT      CORONARY STENT PLACEMENT  01/2000    EYE SURGERY      Cataract bilateral    femoral stents      bilateral    SKIN CANCER EXCISION           Family History   No family history on file.        Social History   .  Social History     Socioeconomic History    Marital status:    Tobacco Use    Smoking status: Former Smoker     Packs/day: 2.00     Years: 40.00     Pack years: 80.00     Types: Cigarettes     Start date: 4/17/1963     Quit date: 4/17/2018     Years since quitting: 3.7    Smokeless tobacco: Never Used    Tobacco comment: 3/3 ppd x 40 yrs. Currently 3-4 cigarettes daily .He is trying  to quit. Is using a Vapor cigarettes  2-3 x's a day.   Substance and Sexual Activity    Alcohol use: Yes     Alcohol/week: 3.0 standard drinks     Types: 3 Cans of beer per week     Comment: beer daily 3-4    Drug use: No    Sexual activity: Not Currently         Allergies   Review of patient's allergies indicates:  No Known Allergies        Physical Exam     Vitals:    12/30/21 1421   BP: 103/65   Pulse: 85         Body mass index is 21.86 kg/m².      General: AOx3, NAD   Respiratory:  Symmetric chest rise, normal effort  Oral Cavity:  Oral Tongue mobile.  Roughly 3.5 cm ulcerated lesion of the left anterior/lateral tongue.  There are several mm palpable induration surrounding it.. Hard Palate WNL. No buccal or FOM lesions.  Oropharynx:  No masses/lesions of the posterior pharyngeal wall. Tonsillar fossa without lesions. Soft palate without masses. Midline uvula.   Neck: No scars.  Roughly 3 cm firm, minimally mobile left submental/submandibular adenopathy.  No thyromegaly or thyroid nodules.  Normal range of motion.    Face: House Brackmann I bilaterally.     CT neck, chest reviewed.    Assessment/Plan  Problem List Items Addressed This Visit        Oncology    Squamous cell cancer of tongue - Primary     [id number] flaps  are carcinoma your cough.  For the operating room for for when in lateral over the bilateral neck dissection depending on her upper lip examination of his tumor.  He will also require free flap reconstruction likely with an anterolateral thigh free flap, tracheostomy and PEG tube placement.  Surgery is scheduled on January 3, 2022.

## 2022-01-02 NOTE — ASSESSMENT & PLAN NOTE
[id number] flaps are carcinoma your cough.  For the operating room for for when in lateral over the bilateral neck dissection depending on her upper lip examination of his tumor.  He will also require free flap reconstruction likely with an anterolateral thigh free flap, tracheostomy and PEG tube placement.  Surgery is scheduled on January 3, 2022.

## 2022-01-03 ENCOUNTER — ANESTHESIA EVENT (OUTPATIENT)
Dept: SURGERY | Facility: HOSPITAL | Age: 72
DRG: 011 | End: 2022-01-03
Payer: MEDICARE

## 2022-01-03 ENCOUNTER — HOSPITAL ENCOUNTER (INPATIENT)
Facility: HOSPITAL | Age: 72
LOS: 21 days | Discharge: NURSING, REHAB OR LONG-TERM CARE | DRG: 011 | End: 2022-01-24
Attending: OTOLARYNGOLOGY | Admitting: OTOLARYNGOLOGY
Payer: MEDICARE

## 2022-01-03 DIAGNOSIS — C02.9 SQUAMOUS CELL CANCER OF TONGUE: Primary | ICD-10-CM

## 2022-01-03 DIAGNOSIS — C02.9 TONGUE CANCER: ICD-10-CM

## 2022-01-03 DIAGNOSIS — K14.8 MASS OF TONGUE: ICD-10-CM

## 2022-01-03 DIAGNOSIS — R55 SYNCOPE AND COLLAPSE: ICD-10-CM

## 2022-01-03 LAB
ALBUMIN SERPL BCP-MCNC: 2.9 G/DL (ref 3.5–5.2)
ALP SERPL-CCNC: 57 U/L (ref 55–135)
ALT SERPL W/O P-5'-P-CCNC: 11 U/L (ref 10–44)
ANION GAP SERPL CALC-SCNC: 10 MMOL/L (ref 8–16)
APTT BLDCRRT: 30.4 SEC (ref 21–32)
AST SERPL-CCNC: 12 U/L (ref 10–40)
BASOPHILS # BLD AUTO: 0.05 K/UL (ref 0–0.2)
BASOPHILS NFR BLD: 0.4 % (ref 0–1.9)
BILIRUB SERPL-MCNC: 0.3 MG/DL (ref 0.1–1)
BUN SERPL-MCNC: 7 MG/DL (ref 8–23)
CALCIUM SERPL-MCNC: 8.6 MG/DL (ref 8.7–10.5)
CHLORIDE SERPL-SCNC: 99 MMOL/L (ref 95–110)
CO2 SERPL-SCNC: 22 MMOL/L (ref 23–29)
CREAT SERPL-MCNC: 0.6 MG/DL (ref 0.5–1.4)
DIFFERENTIAL METHOD: ABNORMAL
EOSINOPHIL # BLD AUTO: 0.5 K/UL (ref 0–0.5)
EOSINOPHIL NFR BLD: 4.7 % (ref 0–8)
ERYTHROCYTE [DISTWIDTH] IN BLOOD BY AUTOMATED COUNT: 13.2 % (ref 11.5–14.5)
EST. GFR  (AFRICAN AMERICAN): >60 ML/MIN/1.73 M^2
EST. GFR  (NON AFRICAN AMERICAN): >60 ML/MIN/1.73 M^2
GLUCOSE SERPL-MCNC: 237 MG/DL (ref 70–110)
HCT VFR BLD AUTO: 29.9 % (ref 40–54)
HGB BLD-MCNC: 9.8 G/DL (ref 14–18)
IMM GRANULOCYTES # BLD AUTO: 0.05 K/UL (ref 0–0.04)
IMM GRANULOCYTES NFR BLD AUTO: 0.4 % (ref 0–0.5)
INR PPP: 1 (ref 0.8–1.2)
LYMPHOCYTES # BLD AUTO: 0.7 K/UL (ref 1–4.8)
LYMPHOCYTES NFR BLD: 5.7 % (ref 18–48)
MCH RBC QN AUTO: 31.2 PG (ref 27–31)
MCHC RBC AUTO-ENTMCNC: 32.8 G/DL (ref 32–36)
MCV RBC AUTO: 95 FL (ref 82–98)
MONOCYTES # BLD AUTO: 1.1 K/UL (ref 0.3–1)
MONOCYTES NFR BLD: 9.9 % (ref 4–15)
NEUTROPHILS # BLD AUTO: 9 K/UL (ref 1.8–7.7)
NEUTROPHILS NFR BLD: 78.9 % (ref 38–73)
NRBC BLD-RTO: 0 /100 WBC
PLATELET # BLD AUTO: 340 K/UL (ref 150–450)
PMV BLD AUTO: 8.4 FL (ref 9.2–12.9)
POTASSIUM SERPL-SCNC: 3.9 MMOL/L (ref 3.5–5.1)
PROT SERPL-MCNC: 5.4 G/DL (ref 6–8.4)
PROTHROMBIN TIME: 10.5 SEC (ref 9–12.5)
RBC # BLD AUTO: 3.14 M/UL (ref 4.6–6.2)
SARS-COV-2 RDRP RESP QL NAA+PROBE: NEGATIVE
SARS-COV-2 RNA RESP QL NAA+PROBE: NOT DETECTED
SARS-COV-2- CYCLE NUMBER: NORMAL
SODIUM SERPL-SCNC: 131 MMOL/L (ref 136–145)
WBC # BLD AUTO: 11.47 K/UL (ref 3.9–12.7)

## 2022-01-03 PROCEDURE — 20000000 HC ICU ROOM

## 2022-01-03 PROCEDURE — U0002 COVID-19 LAB TEST NON-CDC: HCPCS | Performed by: OTOLARYNGOLOGY

## 2022-01-03 PROCEDURE — 27000221 HC OXYGEN, UP TO 24 HOURS

## 2022-01-03 PROCEDURE — 25000003 PHARM REV CODE 250: Performed by: STUDENT IN AN ORGANIZED HEALTH CARE EDUCATION/TRAINING PROGRAM

## 2022-01-03 PROCEDURE — 80053 COMPREHEN METABOLIC PANEL: CPT | Performed by: STUDENT IN AN ORGANIZED HEALTH CARE EDUCATION/TRAINING PROGRAM

## 2022-01-03 PROCEDURE — 85025 COMPLETE CBC W/AUTO DIFF WBC: CPT | Performed by: STUDENT IN AN ORGANIZED HEALTH CARE EDUCATION/TRAINING PROGRAM

## 2022-01-03 PROCEDURE — S5010 5% DEXTROSE AND 0.45% SALINE: HCPCS | Performed by: STUDENT IN AN ORGANIZED HEALTH CARE EDUCATION/TRAINING PROGRAM

## 2022-01-03 PROCEDURE — 85610 PROTHROMBIN TIME: CPT | Performed by: STUDENT IN AN ORGANIZED HEALTH CARE EDUCATION/TRAINING PROGRAM

## 2022-01-03 PROCEDURE — 85730 THROMBOPLASTIN TIME PARTIAL: CPT | Performed by: STUDENT IN AN ORGANIZED HEALTH CARE EDUCATION/TRAINING PROGRAM

## 2022-01-03 PROCEDURE — 27201037 HC PRESSURE MONITORING SET UP

## 2022-01-03 RX ORDER — SODIUM CHLORIDE 0.9 % (FLUSH) 0.9 %
10 SYRINGE (ML) INJECTION
Status: DISCONTINUED | OUTPATIENT
Start: 2022-01-03 | End: 2022-01-03 | Stop reason: HOSPADM

## 2022-01-03 RX ORDER — METOPROLOL SUCCINATE 100 MG/1
200 TABLET, EXTENDED RELEASE ORAL 2 TIMES DAILY
Status: DISCONTINUED | OUTPATIENT
Start: 2022-01-03 | End: 2022-01-04

## 2022-01-03 RX ORDER — ONDANSETRON 8 MG/1
8 TABLET, ORALLY DISINTEGRATING ORAL EVERY 8 HOURS PRN
Status: DISCONTINUED | OUTPATIENT
Start: 2022-01-03 | End: 2022-01-04

## 2022-01-03 RX ORDER — LOSARTAN POTASSIUM 50 MG/1
100 TABLET ORAL DAILY
Status: DISCONTINUED | OUTPATIENT
Start: 2022-01-04 | End: 2022-01-03

## 2022-01-03 RX ORDER — AMLODIPINE BESYLATE 10 MG/1
10 TABLET ORAL DAILY
Status: DISCONTINUED | OUTPATIENT
Start: 2022-01-04 | End: 2022-01-04

## 2022-01-03 RX ORDER — SODIUM CHLORIDE 0.9 % (FLUSH) 0.9 %
10 SYRINGE (ML) INJECTION
Status: DISCONTINUED | OUTPATIENT
Start: 2022-01-03 | End: 2022-01-04

## 2022-01-03 RX ORDER — ALBUTEROL SULFATE 90 UG/1
2 AEROSOL, METERED RESPIRATORY (INHALATION) EVERY 6 HOURS PRN
Status: DISCONTINUED | OUTPATIENT
Start: 2022-01-03 | End: 2022-01-03

## 2022-01-03 RX ORDER — ACETAMINOPHEN 325 MG/1
650 TABLET ORAL EVERY 4 HOURS PRN
Status: DISCONTINUED | OUTPATIENT
Start: 2022-01-03 | End: 2022-01-04

## 2022-01-03 RX ORDER — LIDOCAINE HYDROCHLORIDE 10 MG/ML
1 INJECTION, SOLUTION EPIDURAL; INFILTRATION; INTRACAUDAL; PERINEURAL ONCE
Status: DISCONTINUED | OUTPATIENT
Start: 2022-01-03 | End: 2022-01-08

## 2022-01-03 RX ORDER — LIDOCAINE HYDROCHLORIDE 10 MG/ML
1 INJECTION, SOLUTION EPIDURAL; INFILTRATION; INTRACAUDAL; PERINEURAL ONCE
Status: DISCONTINUED | OUTPATIENT
Start: 2022-01-03 | End: 2022-01-03 | Stop reason: HOSPADM

## 2022-01-03 RX ORDER — MUPIROCIN 20 MG/G
OINTMENT TOPICAL 2 TIMES DAILY
Status: CANCELLED | OUTPATIENT
Start: 2022-01-03 | End: 2022-01-08

## 2022-01-03 RX ORDER — HYDROCODONE BITARTRATE AND ACETAMINOPHEN 5; 325 MG/1; MG/1
1 TABLET ORAL EVERY 4 HOURS PRN
Status: DISCONTINUED | OUTPATIENT
Start: 2022-01-03 | End: 2022-01-03

## 2022-01-03 RX ORDER — CLONIDINE HYDROCHLORIDE 0.1 MG/1
0.1 TABLET ORAL 2 TIMES DAILY
Status: DISCONTINUED | OUTPATIENT
Start: 2022-01-03 | End: 2022-01-04

## 2022-01-03 RX ORDER — TALC
6 POWDER (GRAM) TOPICAL NIGHTLY PRN
Status: DISCONTINUED | OUTPATIENT
Start: 2022-01-03 | End: 2022-01-04

## 2022-01-03 RX ORDER — IPRATROPIUM BROMIDE AND ALBUTEROL SULFATE 2.5; .5 MG/3ML; MG/3ML
3 SOLUTION RESPIRATORY (INHALATION) EVERY 4 HOURS PRN
Status: DISCONTINUED | OUTPATIENT
Start: 2022-01-03 | End: 2022-01-25 | Stop reason: HOSPADM

## 2022-01-03 RX ORDER — OXYCODONE HYDROCHLORIDE 5 MG/1
5 TABLET ORAL EVERY 6 HOURS PRN
Status: DISCONTINUED | OUTPATIENT
Start: 2022-01-03 | End: 2022-01-04

## 2022-01-03 RX ORDER — DEXTROSE MONOHYDRATE AND SODIUM CHLORIDE 5; .45 G/100ML; G/100ML
INJECTION, SOLUTION INTRAVENOUS CONTINUOUS
Status: DISCONTINUED | OUTPATIENT
Start: 2022-01-03 | End: 2022-01-03

## 2022-01-03 RX ORDER — HYDRALAZINE HYDROCHLORIDE 25 MG/1
25 TABLET, FILM COATED ORAL 3 TIMES DAILY
Status: DISCONTINUED | OUTPATIENT
Start: 2022-01-03 | End: 2022-01-04

## 2022-01-03 RX ORDER — ATORVASTATIN CALCIUM 10 MG/1
20 TABLET, FILM COATED ORAL DAILY
Status: DISCONTINUED | OUTPATIENT
Start: 2022-01-04 | End: 2022-01-04

## 2022-01-03 RX ORDER — ACETAMINOPHEN 325 MG/1
650 TABLET ORAL EVERY 8 HOURS PRN
Status: DISCONTINUED | OUTPATIENT
Start: 2022-01-03 | End: 2022-01-03

## 2022-01-03 RX ADMIN — ACETAMINOPHEN 650 MG: 325 TABLET ORAL at 08:01

## 2022-01-03 RX ADMIN — DEXTROSE AND SODIUM CHLORIDE: 5; .45 INJECTION, SOLUTION INTRAVENOUS at 08:01

## 2022-01-03 NOTE — PROGRESS NOTES
ENT Update:     Decision made to postpone case until tomorrow given late OR start time and anticipated long length of surgery. Dr. Smalls discussed with patient and he was understanding. We will admit patient tonight and plan for surgery as a 1st start in the AM.     -NPO @ [facility]   -consent obtained

## 2022-01-03 NOTE — NURSING TRANSFER
Nursing Transfer Note      1/3/2022     Reason patient is being transferred: admit over night for surgery in AM    Transfer SICU from [facility]    Transfer via stretcher    Transfer with wife and belongings    Transported by escort    Medicines sent: n/a    Any special needs or follow-up needed: n/a    Chart send with patient:yes    Notified:floor nurse    Patient reassessed at:     Upon arrival to floor:

## 2022-01-03 NOTE — INTERVAL H&P NOTE
The patient has been examined and the H&P has been reviewed:    I concur with the findings and no changes have occurred since H&P was written.    Surgery risks, benefits and alternative options discussed and understood by patient/family.          Active Hospital Problems    Diagnosis  POA    *Squamous cell cancer of tongue [C02.9]  Yes      Resolved Hospital Problems   No resolved problems to display.

## 2022-01-03 NOTE — PLAN OF CARE
Pt resting comfortably.    Call light in reach.    No questions or concerns at this time.    Would like to keep belongings

## 2022-01-04 ENCOUNTER — ANESTHESIA (OUTPATIENT)
Dept: SURGERY | Facility: HOSPITAL | Age: 72
DRG: 011 | End: 2022-01-04
Payer: MEDICARE

## 2022-01-04 LAB
ABO + RH BLD: NORMAL
ALBUMIN SERPL BCP-MCNC: 2.7 G/DL (ref 3.5–5.2)
ALBUMIN SERPL BCP-MCNC: 3.2 G/DL (ref 3.5–5.2)
ALP SERPL-CCNC: 43 U/L (ref 55–135)
ALP SERPL-CCNC: 66 U/L (ref 55–135)
ALT SERPL W/O P-5'-P-CCNC: 11 U/L (ref 10–44)
ALT SERPL W/O P-5'-P-CCNC: 9 U/L (ref 10–44)
ANION GAP SERPL CALC-SCNC: 12 MMOL/L (ref 8–16)
ANION GAP SERPL CALC-SCNC: 16 MMOL/L (ref 8–16)
APTT BLDCRRT: 29.3 SEC (ref 21–32)
AST SERPL-CCNC: 15 U/L (ref 10–40)
AST SERPL-CCNC: 17 U/L (ref 10–40)
BASOPHILS # BLD AUTO: 0.03 K/UL (ref 0–0.2)
BASOPHILS # BLD AUTO: 0.06 K/UL (ref 0–0.2)
BASOPHILS NFR BLD: 0.2 % (ref 0–1.9)
BASOPHILS NFR BLD: 0.5 % (ref 0–1.9)
BILIRUB SERPL-MCNC: 0.3 MG/DL (ref 0.1–1)
BILIRUB SERPL-MCNC: 1.6 MG/DL (ref 0.1–1)
BLD GP AB SCN CELLS X3 SERPL QL: NORMAL
BLD PROD TYP BPU: NORMAL
BLD PROD TYP BPU: NORMAL
BLOOD UNIT EXPIRATION DATE: NORMAL
BLOOD UNIT EXPIRATION DATE: NORMAL
BLOOD UNIT TYPE CODE: 5100
BLOOD UNIT TYPE CODE: 5100
BLOOD UNIT TYPE: NORMAL
BLOOD UNIT TYPE: NORMAL
BUN SERPL-MCNC: 6 MG/DL (ref 8–23)
BUN SERPL-MCNC: 6 MG/DL (ref 8–23)
CALCIUM SERPL-MCNC: 7.9 MG/DL (ref 8.7–10.5)
CALCIUM SERPL-MCNC: 9.1 MG/DL (ref 8.7–10.5)
CHLORIDE SERPL-SCNC: 100 MMOL/L (ref 95–110)
CHLORIDE SERPL-SCNC: 98 MMOL/L (ref 95–110)
CO2 SERPL-SCNC: 18 MMOL/L (ref 23–29)
CO2 SERPL-SCNC: 23 MMOL/L (ref 23–29)
CODING SYSTEM: NORMAL
CODING SYSTEM: NORMAL
CREAT SERPL-MCNC: 0.6 MG/DL (ref 0.5–1.4)
CREAT SERPL-MCNC: 0.6 MG/DL (ref 0.5–1.4)
DIFFERENTIAL METHOD: ABNORMAL
DIFFERENTIAL METHOD: ABNORMAL
DISPENSE STATUS: NORMAL
DISPENSE STATUS: NORMAL
EOSINOPHIL # BLD AUTO: 0 K/UL (ref 0–0.5)
EOSINOPHIL # BLD AUTO: 0.7 K/UL (ref 0–0.5)
EOSINOPHIL NFR BLD: 0 % (ref 0–8)
EOSINOPHIL NFR BLD: 5.6 % (ref 0–8)
ERYTHROCYTE [DISTWIDTH] IN BLOOD BY AUTOMATED COUNT: 13 % (ref 11.5–14.5)
ERYTHROCYTE [DISTWIDTH] IN BLOOD BY AUTOMATED COUNT: 14.6 % (ref 11.5–14.5)
EST. GFR  (AFRICAN AMERICAN): >60 ML/MIN/1.73 M^2
EST. GFR  (AFRICAN AMERICAN): >60 ML/MIN/1.73 M^2
EST. GFR  (NON AFRICAN AMERICAN): >60 ML/MIN/1.73 M^2
EST. GFR  (NON AFRICAN AMERICAN): >60 ML/MIN/1.73 M^2
GLUCOSE SERPL-MCNC: 160 MG/DL (ref 70–110)
GLUCOSE SERPL-MCNC: 90 MG/DL (ref 70–110)
HCT VFR BLD AUTO: 29.5 % (ref 40–54)
HCT VFR BLD AUTO: 31.6 % (ref 40–54)
HGB BLD-MCNC: 10.4 G/DL (ref 14–18)
HGB BLD-MCNC: 9.2 G/DL (ref 14–18)
IMM GRANULOCYTES # BLD AUTO: 0.06 K/UL (ref 0–0.04)
IMM GRANULOCYTES # BLD AUTO: 0.09 K/UL (ref 0–0.04)
IMM GRANULOCYTES NFR BLD AUTO: 0.5 % (ref 0–0.5)
IMM GRANULOCYTES NFR BLD AUTO: 0.5 % (ref 0–0.5)
INR PPP: 0.9 (ref 0.8–1.2)
LYMPHOCYTES # BLD AUTO: 0.4 K/UL (ref 1–4.8)
LYMPHOCYTES # BLD AUTO: 0.6 K/UL (ref 1–4.8)
LYMPHOCYTES NFR BLD: 1.9 % (ref 18–48)
LYMPHOCYTES NFR BLD: 5.2 % (ref 18–48)
MAGNESIUM SERPL-MCNC: 1.9 MG/DL (ref 1.6–2.6)
MAGNESIUM SERPL-MCNC: 2 MG/DL (ref 1.6–2.6)
MCH RBC QN AUTO: 29.9 PG (ref 27–31)
MCH RBC QN AUTO: 30.9 PG (ref 27–31)
MCHC RBC AUTO-ENTMCNC: 31.2 G/DL (ref 32–36)
MCHC RBC AUTO-ENTMCNC: 32.9 G/DL (ref 32–36)
MCV RBC AUTO: 94 FL (ref 82–98)
MCV RBC AUTO: 96 FL (ref 82–98)
MONOCYTES # BLD AUTO: 1.1 K/UL (ref 0.3–1)
MONOCYTES # BLD AUTO: 1.3 K/UL (ref 0.3–1)
MONOCYTES NFR BLD: 10.5 % (ref 4–15)
MONOCYTES NFR BLD: 6 % (ref 4–15)
NEUTROPHILS # BLD AUTO: 16.7 K/UL (ref 1.8–7.7)
NEUTROPHILS # BLD AUTO: 9.6 K/UL (ref 1.8–7.7)
NEUTROPHILS NFR BLD: 77.7 % (ref 38–73)
NEUTROPHILS NFR BLD: 91.4 % (ref 38–73)
NRBC BLD-RTO: 0 /100 WBC
NRBC BLD-RTO: 0 /100 WBC
PHOSPHATE SERPL-MCNC: 2.9 MG/DL (ref 2.7–4.5)
PHOSPHATE SERPL-MCNC: 4.2 MG/DL (ref 2.7–4.5)
PLATELET # BLD AUTO: 273 K/UL (ref 150–450)
PLATELET # BLD AUTO: 372 K/UL (ref 150–450)
PMV BLD AUTO: 8.1 FL (ref 9.2–12.9)
PMV BLD AUTO: 8.6 FL (ref 9.2–12.9)
POCT GLUCOSE: 148 MG/DL (ref 70–110)
POTASSIUM SERPL-SCNC: 4.1 MMOL/L (ref 3.5–5.1)
POTASSIUM SERPL-SCNC: 4.5 MMOL/L (ref 3.5–5.1)
PROT SERPL-MCNC: 4.5 G/DL (ref 6–8.4)
PROT SERPL-MCNC: 5.9 G/DL (ref 6–8.4)
PROTHROMBIN TIME: 10.3 SEC (ref 9–12.5)
RBC # BLD AUTO: 3.08 M/UL (ref 4.6–6.2)
RBC # BLD AUTO: 3.37 M/UL (ref 4.6–6.2)
SODIUM SERPL-SCNC: 133 MMOL/L (ref 136–145)
SODIUM SERPL-SCNC: 134 MMOL/L (ref 136–145)
TRANS ERYTHROCYTES VOL PATIENT: NORMAL ML
TRANS ERYTHROCYTES VOL PATIENT: NORMAL ML
WBC # BLD AUTO: 12.3 K/UL (ref 3.9–12.7)
WBC # BLD AUTO: 18.25 K/UL (ref 3.9–12.7)

## 2022-01-04 PROCEDURE — 14301 TIS TRNFR ANY 30.1-60 SQ CM: CPT | Mod: 51,,, | Performed by: OTOLARYNGOLOGY

## 2022-01-04 PROCEDURE — 37000008 HC ANESTHESIA 1ST 15 MINUTES: Performed by: OTOLARYNGOLOGY

## 2022-01-04 PROCEDURE — D9220A PRA ANESTHESIA: ICD-10-PCS | Mod: CRNA,,, | Performed by: NURSE ANESTHETIST, CERTIFIED REGISTERED

## 2022-01-04 PROCEDURE — 84100 ASSAY OF PHOSPHORUS: CPT | Mod: 91 | Performed by: STUDENT IN AN ORGANIZED HEALTH CARE EDUCATION/TRAINING PROGRAM

## 2022-01-04 PROCEDURE — 27201037 HC PRESSURE MONITORING SET UP

## 2022-01-04 PROCEDURE — 25000003 PHARM REV CODE 250: Performed by: STUDENT IN AN ORGANIZED HEALTH CARE EDUCATION/TRAINING PROGRAM

## 2022-01-04 PROCEDURE — 88331 PR  PATH CONSULT IN SURG,W FRZ SEC: ICD-10-PCS | Mod: 26,,, | Performed by: PATHOLOGY

## 2022-01-04 PROCEDURE — 88305 TISSUE EXAM BY PATHOLOGIST: CPT | Performed by: PATHOLOGY

## 2022-01-04 PROCEDURE — 37000009 HC ANESTHESIA EA ADD 15 MINS: Performed by: OTOLARYNGOLOGY

## 2022-01-04 PROCEDURE — 20000000 HC ICU ROOM

## 2022-01-04 PROCEDURE — 88307 PR  SURG PATH,LEVEL V: ICD-10-PCS | Mod: 26,,, | Performed by: PATHOLOGY

## 2022-01-04 PROCEDURE — 86920 COMPATIBILITY TEST SPIN: CPT | Performed by: STUDENT IN AN ORGANIZED HEALTH CARE EDUCATION/TRAINING PROGRAM

## 2022-01-04 PROCEDURE — 63600175 PHARM REV CODE 636 W HCPCS: Performed by: NURSE ANESTHETIST, CERTIFIED REGISTERED

## 2022-01-04 PROCEDURE — 36620 INSERTION CATHETER ARTERY: CPT | Mod: 59,,, | Performed by: ANESTHESIOLOGY

## 2022-01-04 PROCEDURE — 80053 COMPREHEN METABOLIC PANEL: CPT | Mod: 91 | Performed by: STUDENT IN AN ORGANIZED HEALTH CARE EDUCATION/TRAINING PROGRAM

## 2022-01-04 PROCEDURE — 63600175 PHARM REV CODE 636 W HCPCS: Performed by: STUDENT IN AN ORGANIZED HEALTH CARE EDUCATION/TRAINING PROGRAM

## 2022-01-04 PROCEDURE — 85730 THROMBOPLASTIN TIME PARTIAL: CPT | Performed by: STUDENT IN AN ORGANIZED HEALTH CARE EDUCATION/TRAINING PROGRAM

## 2022-01-04 PROCEDURE — 40845 RECONSTRUCTION OF MOUTH: CPT | Mod: 51,,, | Performed by: OTOLARYNGOLOGY

## 2022-01-04 PROCEDURE — P9021 RED BLOOD CELLS UNIT: HCPCS | Performed by: STUDENT IN AN ORGANIZED HEALTH CARE EDUCATION/TRAINING PROGRAM

## 2022-01-04 PROCEDURE — 88332 PR  PATH CONSULT IN SURG,W ADDN FRZ SEC: ICD-10-PCS | Mod: 26,,, | Performed by: PATHOLOGY

## 2022-01-04 PROCEDURE — 15757 PR FREE SKIN FLAP W MICROVASC ANAST: ICD-10-PCS | Mod: ,,, | Performed by: OTOLARYNGOLOGY

## 2022-01-04 PROCEDURE — 88305 TISSUE EXAM BY PATHOLOGIST: CPT | Mod: 26,,, | Performed by: PATHOLOGY

## 2022-01-04 PROCEDURE — 25000003 PHARM REV CODE 250: Performed by: OTOLARYNGOLOGY

## 2022-01-04 PROCEDURE — 88342 IMHCHEM/IMCYTCHM 1ST ANTB: CPT | Mod: 26,,, | Performed by: PATHOLOGY

## 2022-01-04 PROCEDURE — 38724 PR REMOVAL NODES, NECK,CERV MOD RAD: ICD-10-PCS | Mod: 50,59,, | Performed by: OTOLARYNGOLOGY

## 2022-01-04 PROCEDURE — 88332 PATH CONSLTJ SURG EA ADD BLK: CPT | Mod: 26,,, | Performed by: PATHOLOGY

## 2022-01-04 PROCEDURE — 85025 COMPLETE CBC W/AUTO DIFF WBC: CPT | Performed by: STUDENT IN AN ORGANIZED HEALTH CARE EDUCATION/TRAINING PROGRAM

## 2022-01-04 PROCEDURE — 85025 COMPLETE CBC W/AUTO DIFF WBC: CPT | Mod: 91 | Performed by: STUDENT IN AN ORGANIZED HEALTH CARE EDUCATION/TRAINING PROGRAM

## 2022-01-04 PROCEDURE — 88307 TISSUE EXAM BY PATHOLOGIST: CPT | Performed by: PATHOLOGY

## 2022-01-04 PROCEDURE — 88342 CHG IMMUNOCYTOCHEMISTRY: ICD-10-PCS | Mod: 26,,, | Performed by: PATHOLOGY

## 2022-01-04 PROCEDURE — 88331 PATH CONSLTJ SURG 1 BLK 1SPC: CPT | Performed by: PATHOLOGY

## 2022-01-04 PROCEDURE — 27800903 OPTIME MED/SURG SUP & DEVICES OTHER IMPLANTS: Performed by: OTOLARYNGOLOGY

## 2022-01-04 PROCEDURE — 88331 PATH CONSLTJ SURG 1 BLK 1SPC: CPT | Mod: 26,,, | Performed by: PATHOLOGY

## 2022-01-04 PROCEDURE — 88309 TISSUE EXAM BY PATHOLOGIST: CPT | Performed by: PATHOLOGY

## 2022-01-04 PROCEDURE — 31525 PR LARYNGOSCOPY,DIRECT,DIAGNOSTIC: ICD-10-PCS | Mod: 51,,, | Performed by: OTOLARYNGOLOGY

## 2022-01-04 PROCEDURE — D9220A PRA ANESTHESIA: Mod: ANES,,, | Performed by: ANESTHESIOLOGY

## 2022-01-04 PROCEDURE — 14302 PR ADJ TISS XFER ANY AREA,EA ADD 30.0 SQCM: ICD-10-PCS | Mod: ,,, | Performed by: OTOLARYNGOLOGY

## 2022-01-04 PROCEDURE — D9220A PRA ANESTHESIA: ICD-10-PCS | Mod: ANES,,, | Performed by: ANESTHESIOLOGY

## 2022-01-04 PROCEDURE — 88332 PATH CONSLTJ SURG EA ADD BLK: CPT | Performed by: PATHOLOGY

## 2022-01-04 PROCEDURE — 99900035 HC TECH TIME PER 15 MIN (STAT)

## 2022-01-04 PROCEDURE — 84100 ASSAY OF PHOSPHORUS: CPT | Performed by: STUDENT IN AN ORGANIZED HEALTH CARE EDUCATION/TRAINING PROGRAM

## 2022-01-04 PROCEDURE — 36620 PR INSERT CATH,ART,PERCUT,SHORTTERM: ICD-10-PCS | Mod: 59,,, | Performed by: ANESTHESIOLOGY

## 2022-01-04 PROCEDURE — 83735 ASSAY OF MAGNESIUM: CPT | Performed by: STUDENT IN AN ORGANIZED HEALTH CARE EDUCATION/TRAINING PROGRAM

## 2022-01-04 PROCEDURE — 88309 TISSUE EXAM BY PATHOLOGIST: CPT | Mod: 26,,, | Performed by: PATHOLOGY

## 2022-01-04 PROCEDURE — 14302 TIS TRNFR ADDL 30 SQ CM: CPT | Mod: ,,, | Performed by: OTOLARYNGOLOGY

## 2022-01-04 PROCEDURE — 38724 REMOVAL OF LYMPH NODES NECK: CPT | Mod: 50,59,, | Performed by: OTOLARYNGOLOGY

## 2022-01-04 PROCEDURE — 40845 PR RECONSTRUC MOUTH COMPLEX: ICD-10-PCS | Mod: 51,,, | Performed by: OTOLARYNGOLOGY

## 2022-01-04 PROCEDURE — 36000706: Performed by: OTOLARYNGOLOGY

## 2022-01-04 PROCEDURE — 99291 PR CRITICAL CARE, E/M 30-74 MINUTES: ICD-10-PCS | Mod: GC,,, | Performed by: STUDENT IN AN ORGANIZED HEALTH CARE EDUCATION/TRAINING PROGRAM

## 2022-01-04 PROCEDURE — 63600175 PHARM REV CODE 636 W HCPCS: Performed by: OTOLARYNGOLOGY

## 2022-01-04 PROCEDURE — 88309 PR  SURG PATH,LEVEL VI: ICD-10-PCS | Mod: 26,,, | Performed by: PATHOLOGY

## 2022-01-04 PROCEDURE — 99291 CRITICAL CARE FIRST HOUR: CPT | Mod: GC,,, | Performed by: STUDENT IN AN ORGANIZED HEALTH CARE EDUCATION/TRAINING PROGRAM

## 2022-01-04 PROCEDURE — 27000221 HC OXYGEN, UP TO 24 HOURS

## 2022-01-04 PROCEDURE — 41140 REMOVAL OF TONGUE: CPT | Mod: ,,, | Performed by: OTOLARYNGOLOGY

## 2022-01-04 PROCEDURE — 27201423 OPTIME MED/SURG SUP & DEVICES STERILE SUPPLY: Performed by: OTOLARYNGOLOGY

## 2022-01-04 PROCEDURE — 14301 PR ADJ TISS XFER ANY AREA,30.1-60 SQCM: ICD-10-PCS | Mod: 51,,, | Performed by: OTOLARYNGOLOGY

## 2022-01-04 PROCEDURE — 25000003 PHARM REV CODE 250

## 2022-01-04 PROCEDURE — 36000707: Performed by: OTOLARYNGOLOGY

## 2022-01-04 PROCEDURE — 86901 BLOOD TYPING SEROLOGIC RH(D): CPT | Performed by: STUDENT IN AN ORGANIZED HEALTH CARE EDUCATION/TRAINING PROGRAM

## 2022-01-04 PROCEDURE — C1729 CATH, DRAINAGE: HCPCS | Performed by: OTOLARYNGOLOGY

## 2022-01-04 PROCEDURE — 88342 IMHCHEM/IMCYTCHM 1ST ANTB: CPT | Performed by: PATHOLOGY

## 2022-01-04 PROCEDURE — 25000003 PHARM REV CODE 250: Performed by: NURSE ANESTHETIST, CERTIFIED REGISTERED

## 2022-01-04 PROCEDURE — 31525 DX LARYNGOSCOPY EXCL NB: CPT | Mod: 51,,, | Performed by: OTOLARYNGOLOGY

## 2022-01-04 PROCEDURE — 85610 PROTHROMBIN TIME: CPT | Performed by: STUDENT IN AN ORGANIZED HEALTH CARE EDUCATION/TRAINING PROGRAM

## 2022-01-04 PROCEDURE — 83735 ASSAY OF MAGNESIUM: CPT | Mod: 91 | Performed by: STUDENT IN AN ORGANIZED HEALTH CARE EDUCATION/TRAINING PROGRAM

## 2022-01-04 PROCEDURE — 88305 TISSUE EXAM BY PATHOLOGIST: ICD-10-PCS | Mod: 26,,, | Performed by: PATHOLOGY

## 2022-01-04 PROCEDURE — P9045 ALBUMIN (HUMAN), 5%, 250 ML: HCPCS | Mod: JG | Performed by: STUDENT IN AN ORGANIZED HEALTH CARE EDUCATION/TRAINING PROGRAM

## 2022-01-04 PROCEDURE — 94761 N-INVAS EAR/PLS OXIMETRY MLT: CPT

## 2022-01-04 PROCEDURE — 41140 PR REMOVAL OF TONGUE: ICD-10-PCS | Mod: ,,, | Performed by: OTOLARYNGOLOGY

## 2022-01-04 PROCEDURE — 15757 FREE SKIN FLAP MICROVASC: CPT | Mod: ,,, | Performed by: OTOLARYNGOLOGY

## 2022-01-04 PROCEDURE — 80053 COMPREHEN METABOLIC PANEL: CPT | Performed by: STUDENT IN AN ORGANIZED HEALTH CARE EDUCATION/TRAINING PROGRAM

## 2022-01-04 PROCEDURE — 88307 TISSUE EXAM BY PATHOLOGIST: CPT | Mod: 26,,, | Performed by: PATHOLOGY

## 2022-01-04 PROCEDURE — C1769 GUIDE WIRE: HCPCS | Performed by: OTOLARYNGOLOGY

## 2022-01-04 PROCEDURE — D9220A PRA ANESTHESIA: Mod: CRNA,,, | Performed by: NURSE ANESTHETIST, CERTIFIED REGISTERED

## 2022-01-04 DEVICE — COUPLER SYSTEM MICRO VASC ANAS: Type: IMPLANTABLE DEVICE | Site: NECK | Status: FUNCTIONAL

## 2022-01-04 RX ORDER — KETAMINE HCL IN 0.9 % NACL 50 MG/5 ML
SYRINGE (ML) INTRAVENOUS
Status: DISCONTINUED | OUTPATIENT
Start: 2022-01-04 | End: 2022-01-04

## 2022-01-04 RX ORDER — SODIUM CHLORIDE 0.9 % (FLUSH) 0.9 %
10 SYRINGE (ML) INJECTION
Status: CANCELLED | OUTPATIENT
Start: 2022-01-04

## 2022-01-04 RX ORDER — CLONIDINE HYDROCHLORIDE 0.1 MG/1
0.1 TABLET ORAL 2 TIMES DAILY
Status: DISCONTINUED | OUTPATIENT
Start: 2022-01-04 | End: 2022-01-04

## 2022-01-04 RX ORDER — DEXAMETHASONE SODIUM PHOSPHATE 4 MG/ML
INJECTION, SOLUTION INTRA-ARTICULAR; INTRALESIONAL; INTRAMUSCULAR; INTRAVENOUS; SOFT TISSUE
Status: DISCONTINUED | OUTPATIENT
Start: 2022-01-04 | End: 2022-01-04

## 2022-01-04 RX ORDER — FOLIC ACID 1 MG/1
1 TABLET ORAL DAILY
Status: DISCONTINUED | OUTPATIENT
Start: 2022-01-04 | End: 2022-01-04

## 2022-01-04 RX ORDER — ATORVASTATIN CALCIUM 20 MG/1
20 TABLET, FILM COATED ORAL DAILY
Status: DISCONTINUED | OUTPATIENT
Start: 2022-01-04 | End: 2022-01-25 | Stop reason: HOSPADM

## 2022-01-04 RX ORDER — ONDANSETRON 2 MG/ML
4 INJECTION INTRAMUSCULAR; INTRAVENOUS EVERY 6 HOURS PRN
Status: DISCONTINUED | OUTPATIENT
Start: 2022-01-04 | End: 2022-01-25 | Stop reason: HOSPADM

## 2022-01-04 RX ORDER — DOCUSATE SODIUM 50 MG/5ML
100 LIQUID ORAL 2 TIMES DAILY
Status: DISCONTINUED | OUTPATIENT
Start: 2022-01-04 | End: 2022-01-13

## 2022-01-04 RX ORDER — CALCIUM CHLORIDE INJECTION 100 MG/ML
INJECTION, SOLUTION INTRAVENOUS
Status: DISCONTINUED | OUTPATIENT
Start: 2022-01-04 | End: 2022-01-04

## 2022-01-04 RX ORDER — LIDOCAINE HYDROCHLORIDE 40 MG/ML
INJECTION, SOLUTION RETROBULBAR
Status: DISCONTINUED | OUTPATIENT
Start: 2022-01-04 | End: 2022-01-04

## 2022-01-04 RX ORDER — EPHEDRINE SULFATE 50 MG/ML
INJECTION, SOLUTION INTRAVENOUS
Status: DISCONTINUED | OUTPATIENT
Start: 2022-01-04 | End: 2022-01-04

## 2022-01-04 RX ORDER — HEPARIN SODIUM 1000 [USP'U]/ML
INJECTION, SOLUTION INTRAVENOUS; SUBCUTANEOUS
Status: DISCONTINUED | OUTPATIENT
Start: 2022-01-04 | End: 2022-01-04

## 2022-01-04 RX ORDER — HALOPERIDOL 5 MG/ML
0.5 INJECTION INTRAMUSCULAR EVERY 10 MIN PRN
Status: CANCELLED | OUTPATIENT
Start: 2022-01-04

## 2022-01-04 RX ORDER — LIDOCAINE HYDROCHLORIDE 20 MG/ML
INJECTION, SOLUTION EPIDURAL; INFILTRATION; INTRACAUDAL; PERINEURAL
Status: DISCONTINUED | OUTPATIENT
Start: 2022-01-04 | End: 2022-01-04

## 2022-01-04 RX ORDER — VASOPRESSIN 20 [USP'U]/ML
INJECTION, SOLUTION INTRAMUSCULAR; SUBCUTANEOUS
Status: DISCONTINUED | OUTPATIENT
Start: 2022-01-04 | End: 2022-01-04

## 2022-01-04 RX ORDER — AMLODIPINE BESYLATE 10 MG/1
10 TABLET ORAL DAILY
Status: DISCONTINUED | OUTPATIENT
Start: 2022-01-05 | End: 2022-01-13

## 2022-01-04 RX ORDER — PROPOFOL 10 MG/ML
VIAL (ML) INTRAVENOUS
Status: DISCONTINUED | OUTPATIENT
Start: 2022-01-04 | End: 2022-01-04

## 2022-01-04 RX ORDER — PHENYLEPHRINE HYDROCHLORIDE 10 MG/ML
INJECTION INTRAVENOUS CONTINUOUS PRN
Status: DISCONTINUED | OUTPATIENT
Start: 2022-01-04 | End: 2022-01-04

## 2022-01-04 RX ORDER — FENTANYL CITRATE 50 UG/ML
25 INJECTION, SOLUTION INTRAMUSCULAR; INTRAVENOUS EVERY 5 MIN PRN
Status: CANCELLED | OUTPATIENT
Start: 2022-01-04

## 2022-01-04 RX ORDER — OXYCODONE HCL 5 MG/5 ML
5 SOLUTION, ORAL ORAL EVERY 4 HOURS PRN
Status: DISCONTINUED | OUTPATIENT
Start: 2022-01-04 | End: 2022-01-25 | Stop reason: HOSPADM

## 2022-01-04 RX ORDER — PHENYLEPHRINE HCL IN 0.9% NACL 1 MG/10 ML
SYRINGE (ML) INTRAVENOUS
Status: DISCONTINUED | OUTPATIENT
Start: 2022-01-04 | End: 2022-01-04

## 2022-01-04 RX ORDER — HYDROMORPHONE HYDROCHLORIDE 1 MG/ML
0.5 INJECTION, SOLUTION INTRAMUSCULAR; INTRAVENOUS; SUBCUTANEOUS EVERY 6 HOURS PRN
Status: DISCONTINUED | OUTPATIENT
Start: 2022-01-04 | End: 2022-01-10

## 2022-01-04 RX ORDER — LORAZEPAM 2 MG/ML
2 INJECTION INTRAMUSCULAR
Status: DISCONTINUED | OUTPATIENT
Start: 2022-01-04 | End: 2022-01-10

## 2022-01-04 RX ORDER — ALBUMIN HUMAN 50 G/1000ML
SOLUTION INTRAVENOUS CONTINUOUS PRN
Status: DISCONTINUED | OUTPATIENT
Start: 2022-01-04 | End: 2022-01-04

## 2022-01-04 RX ORDER — NAPROXEN SODIUM 220 MG/1
81 TABLET, FILM COATED ORAL DAILY
Status: DISCONTINUED | OUTPATIENT
Start: 2022-01-04 | End: 2022-01-25 | Stop reason: HOSPADM

## 2022-01-04 RX ORDER — NOREPINEPHRINE BITARTRATE/D5W 4MG/250ML
PLASTIC BAG, INJECTION (ML) INTRAVENOUS
Status: DISPENSED
Start: 2022-01-04 | End: 2022-01-05

## 2022-01-04 RX ORDER — ACETAMINOPHEN 10 MG/ML
INJECTION, SOLUTION INTRAVENOUS
Status: DISCONTINUED | OUTPATIENT
Start: 2022-01-04 | End: 2022-01-04

## 2022-01-04 RX ORDER — BACITRACIN ZINC 500 [USP'U]/G
OINTMENT TOPICAL 2 TIMES DAILY
Status: DISCONTINUED | OUTPATIENT
Start: 2022-01-04 | End: 2022-01-25 | Stop reason: HOSPADM

## 2022-01-04 RX ORDER — HYDRALAZINE HYDROCHLORIDE 25 MG/1
25 TABLET, FILM COATED ORAL 3 TIMES DAILY
Status: DISCONTINUED | OUTPATIENT
Start: 2022-01-04 | End: 2022-01-04

## 2022-01-04 RX ORDER — METOPROLOL TARTRATE 50 MG/1
100 TABLET ORAL 2 TIMES DAILY
Status: DISCONTINUED | OUTPATIENT
Start: 2022-01-04 | End: 2022-01-09

## 2022-01-04 RX ORDER — AMPICILLIN AND SULBACTAM 2; 1 G/1; G/1
INJECTION, POWDER, FOR SOLUTION INTRAMUSCULAR; INTRAVENOUS
Status: DISCONTINUED | OUTPATIENT
Start: 2022-01-04 | End: 2022-01-04

## 2022-01-04 RX ORDER — BACITRACIN ZINC 500 UNIT/G
OINTMENT (GRAM) TOPICAL
Status: DISCONTINUED | OUTPATIENT
Start: 2022-01-04 | End: 2022-01-04

## 2022-01-04 RX ORDER — SODIUM CHLORIDE 9 MG/ML
INJECTION, SOLUTION INTRAVENOUS CONTINUOUS
Status: DISCONTINUED | OUTPATIENT
Start: 2022-01-04 | End: 2022-01-06

## 2022-01-04 RX ORDER — HYDROMORPHONE HYDROCHLORIDE 1 MG/ML
0.2 INJECTION, SOLUTION INTRAMUSCULAR; INTRAVENOUS; SUBCUTANEOUS EVERY 5 MIN PRN
Status: CANCELLED | OUTPATIENT
Start: 2022-01-04

## 2022-01-04 RX ORDER — ROCURONIUM BROMIDE 10 MG/ML
INJECTION, SOLUTION INTRAVENOUS
Status: DISCONTINUED | OUTPATIENT
Start: 2022-01-04 | End: 2022-01-04

## 2022-01-04 RX ORDER — HYDROMORPHONE HYDROCHLORIDE 2 MG/ML
INJECTION, SOLUTION INTRAMUSCULAR; INTRAVENOUS; SUBCUTANEOUS
Status: DISCONTINUED | OUTPATIENT
Start: 2022-01-04 | End: 2022-01-04

## 2022-01-04 RX ORDER — PAPAVERINE HYDROCHLORIDE 30 MG/ML
INJECTION INTRAMUSCULAR; INTRAVENOUS
Status: DISCONTINUED | OUTPATIENT
Start: 2022-01-04 | End: 2022-01-04

## 2022-01-04 RX ORDER — ACETAMINOPHEN 325 MG/1
650 TABLET ORAL EVERY 6 HOURS
Status: DISCONTINUED | OUTPATIENT
Start: 2022-01-04 | End: 2022-01-25 | Stop reason: HOSPADM

## 2022-01-04 RX ORDER — OXYCODONE HCL 5 MG/5 ML
10 SOLUTION, ORAL ORAL EVERY 4 HOURS PRN
Status: DISCONTINUED | OUTPATIENT
Start: 2022-01-04 | End: 2022-01-25 | Stop reason: HOSPADM

## 2022-01-04 RX ORDER — SODIUM CHLORIDE 9 MG/ML
INJECTION, SOLUTION INTRAVENOUS CONTINUOUS
Status: DISCONTINUED | OUTPATIENT
Start: 2022-01-04 | End: 2022-01-25 | Stop reason: HOSPADM

## 2022-01-04 RX ORDER — SODIUM CHLORIDE 0.9 % (FLUSH) 0.9 %
10 SYRINGE (ML) INJECTION
Status: DISCONTINUED | OUTPATIENT
Start: 2022-01-04 | End: 2022-01-25 | Stop reason: HOSPADM

## 2022-01-04 RX ORDER — LIDOCAINE HYDROCHLORIDE AND EPINEPHRINE 10; 10 MG/ML; UG/ML
INJECTION, SOLUTION INFILTRATION; PERINEURAL
Status: DISCONTINUED | OUTPATIENT
Start: 2022-01-04 | End: 2022-01-04

## 2022-01-04 RX ORDER — LOSARTAN POTASSIUM 50 MG/1
100 TABLET ORAL DAILY
Status: DISCONTINUED | OUTPATIENT
Start: 2022-01-04 | End: 2022-01-04

## 2022-01-04 RX ORDER — ENOXAPARIN SODIUM 100 MG/ML
40 INJECTION SUBCUTANEOUS EVERY 24 HOURS
Status: DISCONTINUED | OUTPATIENT
Start: 2022-01-05 | End: 2022-01-25 | Stop reason: HOSPADM

## 2022-01-04 RX ORDER — FENTANYL CITRATE 50 UG/ML
INJECTION, SOLUTION INTRAMUSCULAR; INTRAVENOUS
Status: DISCONTINUED | OUTPATIENT
Start: 2022-01-04 | End: 2022-01-04

## 2022-01-04 RX ADMIN — Medication 200 MCG: at 01:01

## 2022-01-04 RX ADMIN — SODIUM CHLORIDE: 0.9 INJECTION, SOLUTION INTRAVENOUS at 04:01

## 2022-01-04 RX ADMIN — ACETAMINOPHEN 1000 MG: 10 INJECTION INTRAVENOUS at 03:01

## 2022-01-04 RX ADMIN — AMPICILLIN SODIUM AND SULBACTAM SODIUM 3 G: 2; 1 INJECTION, POWDER, FOR SOLUTION INTRAMUSCULAR; INTRAVENOUS at 02:01

## 2022-01-04 RX ADMIN — THIAMINE HYDROCHLORIDE 100 MG: 100 INJECTION, SOLUTION INTRAMUSCULAR; INTRAVENOUS at 05:01

## 2022-01-04 RX ADMIN — ROCURONIUM BROMIDE 20 MG: 10 INJECTION, SOLUTION INTRAVENOUS at 11:01

## 2022-01-04 RX ADMIN — Medication 100 MCG: at 09:01

## 2022-01-04 RX ADMIN — SUGAMMADEX 200 MG: 100 INJECTION, SOLUTION INTRAVENOUS at 03:01

## 2022-01-04 RX ADMIN — Medication 200 MCG: at 02:01

## 2022-01-04 RX ADMIN — Medication 30 MG: at 08:01

## 2022-01-04 RX ADMIN — VASOPRESSIN 1 UNITS: 20 INJECTION INTRAVENOUS at 11:01

## 2022-01-04 RX ADMIN — VASOPRESSIN 2 UNITS: 20 INJECTION INTRAVENOUS at 11:01

## 2022-01-04 RX ADMIN — Medication 200 MCG: at 11:01

## 2022-01-04 RX ADMIN — FENTANYL CITRATE 100 MCG: 50 INJECTION INTRAMUSCULAR; INTRAVENOUS at 07:01

## 2022-01-04 RX ADMIN — ACETAMINOPHEN 650 MG: 325 TABLET ORAL at 02:01

## 2022-01-04 RX ADMIN — EPHEDRINE SULFATE 10 MG: 50 INJECTION INTRAVENOUS at 11:01

## 2022-01-04 RX ADMIN — Medication 200 MCG: at 08:01

## 2022-01-04 RX ADMIN — SODIUM CHLORIDE 5 ML/HR: 0.9 INJECTION, SOLUTION INTRAVENOUS at 11:01

## 2022-01-04 RX ADMIN — EPHEDRINE SULFATE 5 MG: 50 INJECTION INTRAVENOUS at 11:01

## 2022-01-04 RX ADMIN — Medication 20 MG: at 08:01

## 2022-01-04 RX ADMIN — ALBUMIN (HUMAN): 12.5 SOLUTION INTRAVENOUS at 10:01

## 2022-01-04 RX ADMIN — Medication 200 MCG: at 10:01

## 2022-01-04 RX ADMIN — VASOPRESSIN 2 UNITS: 20 INJECTION INTRAVENOUS at 12:01

## 2022-01-04 RX ADMIN — AMPICILLIN SODIUM AND SULBACTAM SODIUM 3 G: 2; 1 INJECTION, POWDER, FOR SOLUTION INTRAMUSCULAR; INTRAVENOUS at 11:01

## 2022-01-04 RX ADMIN — ROCURONIUM BROMIDE 20 MG: 10 INJECTION, SOLUTION INTRAVENOUS at 10:01

## 2022-01-04 RX ADMIN — Medication 100 MCG: at 08:01

## 2022-01-04 RX ADMIN — PROPOFOL 150 MG: 10 INJECTION, EMULSION INTRAVENOUS at 07:01

## 2022-01-04 RX ADMIN — ROCURONIUM BROMIDE 20 MG: 10 INJECTION, SOLUTION INTRAVENOUS at 02:01

## 2022-01-04 RX ADMIN — ONDANSETRON 0.5 G: 2 INJECTION INTRAMUSCULAR; INTRAVENOUS at 02:01

## 2022-01-04 RX ADMIN — Medication 200 MCG: at 07:01

## 2022-01-04 RX ADMIN — ROCURONIUM BROMIDE 20 MG: 10 INJECTION, SOLUTION INTRAVENOUS at 01:01

## 2022-01-04 RX ADMIN — ACETAMINOPHEN 650 MG: 325 TABLET ORAL at 11:01

## 2022-01-04 RX ADMIN — ROCURONIUM BROMIDE 20 MG: 10 INJECTION, SOLUTION INTRAVENOUS at 12:01

## 2022-01-04 RX ADMIN — ROCURONIUM BROMIDE 30 MG: 10 INJECTION, SOLUTION INTRAVENOUS at 09:01

## 2022-01-04 RX ADMIN — Medication 200 MCG: at 09:01

## 2022-01-04 RX ADMIN — PROPOFOL 50 MG: 10 INJECTION, EMULSION INTRAVENOUS at 07:01

## 2022-01-04 RX ADMIN — ROCURONIUM BROMIDE 50 MG: 10 INJECTION, SOLUTION INTRAVENOUS at 07:01

## 2022-01-04 RX ADMIN — EPHEDRINE SULFATE 25 MG: 50 INJECTION, SOLUTION INTRAVENOUS at 11:01

## 2022-01-04 RX ADMIN — VASOPRESSIN 2 UNITS: 20 INJECTION INTRAVENOUS at 02:01

## 2022-01-04 RX ADMIN — ROCURONIUM BROMIDE 10 MG: 10 INJECTION, SOLUTION INTRAVENOUS at 12:01

## 2022-01-04 RX ADMIN — LIDOCAINE HYDROCHLORIDE 100 MG: 20 INJECTION, SOLUTION EPIDURAL; INFILTRATION; INTRACAUDAL at 07:01

## 2022-01-04 RX ADMIN — ATORVASTATIN CALCIUM 20 MG: 20 TABLET, FILM COATED ORAL at 05:01

## 2022-01-04 RX ADMIN — FOLIC ACID 1 MG: 5 INJECTION, SOLUTION INTRAMUSCULAR; INTRAVENOUS; SUBCUTANEOUS at 06:01

## 2022-01-04 RX ADMIN — AMPICILLIN SODIUM AND SULBACTAM SODIUM 3 G: 2; 1 INJECTION, POWDER, FOR SOLUTION INTRAMUSCULAR; INTRAVENOUS at 08:01

## 2022-01-04 RX ADMIN — AMPICILLIN SODIUM AND SULBACTAM SODIUM 3 G: 2; 1 INJECTION, POWDER, FOR SOLUTION INTRAMUSCULAR; INTRAVENOUS at 05:01

## 2022-01-04 RX ADMIN — HYDROMORPHONE HYDROCHLORIDE 0.4 MG: 2 INJECTION INTRAMUSCULAR; INTRAVENOUS; SUBCUTANEOUS at 04:01

## 2022-01-04 RX ADMIN — ROCURONIUM BROMIDE 30 MG: 10 INJECTION, SOLUTION INTRAVENOUS at 08:01

## 2022-01-04 RX ADMIN — VASOPRESSIN 2 UNITS: 20 INJECTION INTRAVENOUS at 01:01

## 2022-01-04 RX ADMIN — METOPROLOL TARTRATE 100 MG: 50 TABLET, FILM COATED ORAL at 09:01

## 2022-01-04 RX ADMIN — ONDANSETRON 0.5 G: 2 INJECTION INTRAMUSCULAR; INTRAVENOUS at 09:01

## 2022-01-04 RX ADMIN — HYDROMORPHONE HYDROCHLORIDE 0.4 MG: 2 INJECTION INTRAMUSCULAR; INTRAVENOUS; SUBCUTANEOUS at 03:01

## 2022-01-04 RX ADMIN — PHENYLEPHRINE HYDROCHLORIDE 0.02 MCG/KG/MIN: 10 INJECTION INTRAVENOUS at 02:01

## 2022-01-04 RX ADMIN — ASPIRIN 81 MG CHEWABLE TABLET 81 MG: 81 TABLET CHEWABLE at 05:01

## 2022-01-04 RX ADMIN — BACITRACIN 1 EACH: 500 OINTMENT TOPICAL at 09:01

## 2022-01-04 RX ADMIN — DOCUSATE SODIUM 100 MG: 50 LIQUID ORAL at 09:01

## 2022-01-04 RX ADMIN — EPHEDRINE SULFATE 25 MG: 50 INJECTION, SOLUTION INTRAVENOUS at 01:01

## 2022-01-04 RX ADMIN — DEXAMETHASONE SODIUM PHOSPHATE 4 MG: 4 INJECTION INTRA-ARTICULAR; INTRALESIONAL; INTRAMUSCULAR; INTRAVENOUS; SOFT TISSUE at 08:01

## 2022-01-04 RX ADMIN — VASOPRESSIN 1 UNITS: 20 INJECTION INTRAVENOUS at 01:01

## 2022-01-04 NOTE — SUBJECTIVE & OBJECTIVE
Interval History/Significant Events: Patient admitted to SICU last night, however, his ENT surgery was postponed to today.  He did well overnight, slept well and was NPO at midnight.  No complaints.    Follow-up For: Procedure(s) (LRB):  GLOSSECTOMY, PARTIAL (Left)  DISSECTION, NECK, RADICAL (Left)  CREATION, FREE FLAP (N/A)    Post-Operative Day: 1 Day Post-Op    Objective:     Vital Signs (Most Recent):  Temp: 98.5 °F (36.9 °C) (01/04/22 0300)  Pulse: 68 (01/04/22 0500)  Resp: (!) 21 (01/04/22 0500)  BP: 131/71 (01/04/22 0500)  SpO2: 100 % (01/04/22 0500) Vital Signs (24h Range):  Temp:  [97.9 °F (36.6 °C)-98.6 °F (37 °C)] 98.5 °F (36.9 °C)  Pulse:  [64-78] 68  Resp:  [13-30] 21  SpO2:  [93 %-100 %] 100 %  BP: (107-131)/(55-74) 131/71     Weight: 57.7 kg (127 lb 3.3 oz)  Body mass index is 19.34 kg/m².      Intake/Output Summary (Last 24 hours) at 1/4/2022 0541  Last data filed at 1/4/2022 0500  Gross per 24 hour   Intake 268.68 ml   Output 1575 ml   Net -1306.32 ml       Physical Exam  Constitutional:       General: He is not in acute distress.     Appearance: Normal appearance. He is normal weight. He is not ill-appearing or diaphoretic.   HENT:      Head:      Comments: Patient has large mass over left jaw and neck     Nose: Nose normal.      Mouth/Throat:      Mouth: Mucous membranes are moist.      Comments: Uvula midline, white plaques on left side of tongue with underlying mass  Eyes:      Extraocular Movements: Extraocular movements intact.      Conjunctiva/sclera: Conjunctivae normal.      Pupils: Pupils are equal, round, and reactive to light.   Neck:      Comments: Left-sided large mass overlying jaw and neck,  Cardiovascular:      Rate and Rhythm: Normal rate and regular rhythm.      Pulses: Normal pulses.      Heart sounds: Normal heart sounds.   Pulmonary:      Effort: Pulmonary effort is normal. No respiratory distress.      Breath sounds: Normal breath sounds. No wheezing or rhonchi.   Abdominal:       General: Abdomen is flat. There is no distension.      Palpations: Abdomen is soft.      Tenderness: There is no abdominal tenderness. There is no guarding or rebound.   Musculoskeletal:      Cervical back: Normal range of motion.   Skin:     General: Skin is warm and dry.      Capillary Refill: Capillary refill takes less than 2 seconds.   Neurological:      General: No focal deficit present.      Mental Status: He is alert and oriented to person, place, and time.         Lines/Drains/Airways     Peripheral Intravenous Line                 Peripheral IV - Single Lumen 01/03/22 1307 20 G Anterior;Proximal;Right Forearm <1 day         Peripheral IV - Single Lumen 01/04/22 0516 18 G Right Antecubital <1 day                Significant Labs:    CBC/Anemia Profile:  Recent Labs   Lab 01/03/22 2054 01/04/22  0405   WBC 11.47 12.30   HGB 9.8* 10.4*   HCT 29.9* 31.6*    372   MCV 95 94   RDW 13.2 13.0        Chemistries:  Recent Labs   Lab 01/03/22 2054 01/04/22  0405   * 133*   K 3.9 4.1   CL 99 98   CO2 22* 23   BUN 7* 6*   CREATININE 0.6 0.6   CALCIUM 8.6* 9.1   ALBUMIN 2.9* 3.2*   PROT 5.4* 5.9*   BILITOT 0.3 0.3   ALKPHOS 57 66   ALT 11 11   AST 12 15   MG  --  1.9   PHOS  --  2.9     Significant Imaging:  I have reviewed all pertinent imaging results/findings within the past 24 hours.

## 2022-01-04 NOTE — PROGRESS NOTES
[facility]  Critical Care - Surgery  Progress Note    Patient Name: Fareed Richard Jr.  MRN: [id number]  Admission Date: 1/3/2022  Hospital Length of Stay: 1 days  Code Status: Full Code  Attending Provider: Naeem Smalls MD  Primary Care Provider: Kodi Tubbs MD   Principal Problem: Squamous cell cancer of tongue    Subjective:     Hospital/ICU Course:  Fareed Richard Jr. is a 71 y.o. M with hx of squamous cell carcinoma, HTN, CAD s/p stents, carotid stenosis s/p carotid endarterectomy in 2018, PAD s/p b/l femoral atherectomies and other LE interventions who presented for a surgical removal of mass and face/neck reconstruction surgery with ENT.  The patient procedure was postponed til tomorrow 1/4/21 due to OR delays.       Interval History/Significant Events: Patient admitted to SICU last night, however, his ENT surgery was postponed to today.  He did well overnight, slept well and was NPO at midnight.  No complaints.    Follow-up For: Procedure(s) (LRB):  GLOSSECTOMY, PARTIAL (Left)  DISSECTION, NECK, RADICAL (Left)  CREATION, FREE FLAP (N/A)    Post-Operative Day: 1 Day Post-Op    Objective:     Vital Signs (Most Recent):  Temp: 98.5 °F (36.9 °C) (01/04/22 0300)  Pulse: 68 (01/04/22 0500)  Resp: (!) 21 (01/04/22 0500)  BP: 131/71 (01/04/22 0500)  SpO2: 100 % (01/04/22 0500) Vital Signs (24h Range):  Temp:  [97.9 °F (36.6 °C)-98.6 °F (37 °C)] 98.5 °F (36.9 °C)  Pulse:  [64-78] 68  Resp:  [13-30] 21  SpO2:  [93 %-100 %] 100 %  BP: (107-131)/(55-74) 131/71     Weight: 57.7 kg (127 lb 3.3 oz)  Body mass index is 19.34 kg/m².      Intake/Output Summary (Last 24 hours) at 1/4/2022 0541  Last data filed at 1/4/2022 0500  Gross per 24 hour   Intake 268.68 ml   Output 1575 ml   Net -1306.32 ml       Physical Exam  Constitutional:       General: He is not in acute distress.     Appearance: Normal appearance. He is normal weight. He is not ill-appearing or diaphoretic.   HENT:      Head:       Comments: Patient has large mass over left jaw and neck     Nose: Nose normal.      Mouth/Throat:      Mouth: Mucous membranes are moist.      Comments: Uvula midline, white plaques on left side of tongue with underlying mass  Eyes:      Extraocular Movements: Extraocular movements intact.      Conjunctiva/sclera: Conjunctivae normal.      Pupils: Pupils are equal, round, and reactive to light.   Neck:      Comments: Left-sided large mass overlying jaw and neck,  Cardiovascular:      Rate and Rhythm: Normal rate and regular rhythm.      Pulses: Normal pulses.      Heart sounds: Normal heart sounds.   Pulmonary:      Effort: Pulmonary effort is normal. No respiratory distress.      Breath sounds: Normal breath sounds. No wheezing or rhonchi.   Abdominal:      General: Abdomen is flat. There is no distension.      Palpations: Abdomen is soft.      Tenderness: There is no abdominal tenderness. There is no guarding or rebound.   Musculoskeletal:      Cervical back: Normal range of motion.   Skin:     General: Skin is warm and dry.      Capillary Refill: Capillary refill takes less than 2 seconds.   Neurological:      General: No focal deficit present.      Mental Status: He is alert and oriented to person, place, and time.         Lines/Drains/Airways     Peripheral Intravenous Line                 Peripheral IV - Single Lumen 01/03/22 1307 20 G Anterior;Proximal;Right Forearm <1 day         Peripheral IV - Single Lumen 01/04/22 0516 18 G Right Antecubital <1 day                Significant Labs:    CBC/Anemia Profile:  Recent Labs   Lab 01/03/22 2054 01/04/22  0405   WBC 11.47 12.30   HGB 9.8* 10.4*   HCT 29.9* 31.6*    372   MCV 95 94   RDW 13.2 13.0        Chemistries:  Recent Labs   Lab 01/03/22 2054 01/04/22  0405   * 133*   K 3.9 4.1   CL 99 98   CO2 22* 23   BUN 7* 6*   CREATININE 0.6 0.6   CALCIUM 8.6* 9.1   ALBUMIN 2.9* 3.2*   PROT 5.4* 5.9*   BILITOT 0.3 0.3   ALKPHOS 57 66   ALT 11 11   AST 12  15   MG  --  1.9   PHOS  --  2.9     Significant Imaging:  I have reviewed all pertinent imaging results/findings within the past 24 hours.    Assessment/Plan:     * Squamous cell cancer of tongue  Fareed Richard Jr. is a 71 y.o. M with hx of squamous cell carcinoma, HTN, CAD s/p stents, carotid stenosis s/p carotid endarterectomy, PAD s/p b/l femoral atherectomies and other LE interventions who was admitted to SICU and will undergo glossectomy and face/neck reconstruction with ENT on 1/4/21.    Neuro  - no sedation, A&Ox4  - pain control: tylenol, oxycodone prn     CV  #CAD s/p stents  #Carotid stenosis s/p endarterectomy  #PVS s/p multiple LE interventions including b/l femoral arthetectomies  - continue atorv, holding ASA and plavix per ENT for now  - start ASA post op    #HTN  - continue metoprolol and clonidine  - holding losartan prior to surgery  - Post op: continue metop, will hold other BP meds post-op and reassess BPs     Pulm  #COPD  #Former smoker  - venotlin inhaler  - duonebs prn    Plan for trach post-op    GI  - no active issues  - GI prophylaxis once back from surgery and on vent     F: none  E: replete prn  N: NPO at midnight for surgery tomorrow    Renal  No active issues    Heme/Onc  #Squamous Cell Carcinoma of tongue   - Patient will undergo glossectomy and face/neck reconstruction with flap with ENT tomorrow, 1/4/21  - Once back from surgery, q1h flap checks  - pain control     Endo  -no active issues     Psych  #alcohol use  - patient reports drinking 3-4 beers per night, last drink was 1/1  - no histrory of withdrawal Sx or Sz  - [facility] protocol  - multivitamins    Critical care was time spent personally by me on the following activities: development of treatment plan with patient or surrogate and bedside caregivers, discussions with consultants, evaluation of patient's response to treatment, examination of patient, ordering and performing treatments and interventions, ordering and review of  laboratory studies, ordering and review of radiographic studies, pulse oximetry, re-evaluation of patient's condition.  This critical care time did not overlap with that of any other provider or involve time for any procedures.     Brenden Dalton,   Critical Care - Surgery  [facility]

## 2022-01-04 NOTE — ASSESSMENT & PLAN NOTE
Fareed WILLIAM Jose Manuel Grande is a 71 y.o. M with hx of squamous cell carcinoma, HTN, CAD s/p stents, carotid stenosis s/p carotid endarterectomy, PAD s/p b/l femoral atherectomies and other LE interventions who was admitted to SICU and will undergo glossectomy and face/neck reconstruction with ENT on 1/4/21.    Neuro  - no sedation, A&Ox4  - pain control: tylenol, oxycodone prn     CV  #CAD s/p stents  #Carotid stenosis s/p endarterectomy  #PVS s/p multiple LE interventions including b/l femoral arthetectomies  - continue atorv, holding ASA and plavix per ENT for now    #HTN  - continue metoprolol and clonidine  - holding losartan prior to surgery     Pulm  #COPD  #Former smoker  - venotlin inhaler  - duonebs prn    GI  - no active issues     F: none  E: replete prn  N: NPO at midnight for surgery tomorrow    Renal  No active issues    Heme/Onc  #Squamous Cell Carcinoma of tongue   - Patient will undergo glossectomy and face/neck reconstruction with flap with ENT tomorrow, 1/4/21  - NPO at midnight  - tylenol for pain     Endo  -no active issues     Psych  #alcohol use  - patient reports drinking 3-4 beers per night, last drink was 1/1  - no histrory of withdrawal Sx or Sz  - [facility] protocol

## 2022-01-04 NOTE — ASSESSMENT & PLAN NOTE
Fareed WILLIAM Jose Manuel Grande is a 71 y.o. M with hx of squamous cell carcinoma, HTN, CAD s/p stents, carotid stenosis s/p carotid endarterectomy, PAD s/p b/l femoral atherectomies and other LE interventions who was admitted to SICU and will undergo glossectomy and face/neck reconstruction with ENT on 1/4/21.    Neuro  - no sedation, A&Ox4  - pain control: tylenol, oxycodone prn     CV  #CAD s/p stents  #Carotid stenosis s/p endarterectomy  #PVS s/p multiple LE interventions including b/l femoral arthetectomies  - continue atorv, holding ASA and plavix per ENT for now    #HTN  - continue metoprolol and clonidine  - holding losartan prior to surgery     Pulm  #COPD  #Former smoker  - venotlin inhaler  - duonebs prn    GI  - no active issues     F: none  E: replete prn  N: NPO at midnight for surgery tomorrow    Renal  No active issues    Heme/Onc  #Squamous Cell Carcinoma of tongue   - Patient will undergo glossectomy and face/neck reconstruction with flap with ENT tomorrow, 1/4/21  - NPO at midnight  - tylenol for pain     Endo  -no active issues     Psych  #alcohol use  - patient reports drinking 3-4 beers per night, last drink was 1/1  - no histrory of withdrawal Sx or Sz  - [facility] protocol

## 2022-01-04 NOTE — HOSPITAL COURSE
Fareed WILLIAM Jose Manuel Grande is a 71 y.o. M with hx of squamous cell carcinoma, HTN, CAD s/p stents, carotid stenosis s/p carotid endarterectomy in 2018, PAD s/p b/l femoral atherectomies and other LE interventions s/p total glossectomy, tracheostomy and free flap reconstruction with ENT on 1/4.  The patient has been doing well since surgery, requiring limited pain control.  He was stepped down to the floor but stepped back after a fall out of bed.

## 2022-01-04 NOTE — ANESTHESIA PROCEDURE NOTES
Intubation    Date/Time: 1/4/2022 7:23 AM  Performed by: Martin Lagos MD  Authorized by: Belia Armijo MD     Intubation:     Attempted By:  ENT    Method of Intubation:  Surgical tracheostomy    Difficult Airway Encountered?: No      Airway Device:  Coil wire tube    Airway Device Size:  7.5    Style/Cuff Inflation:  Cuffed (inflated to minimal occlusive pressure)    Placement Verified By:  Capnometry         Name band;

## 2022-01-04 NOTE — HPI
Fareed Richard  is a 71 y.o. M with hx of squamous cell carcinoma, HTN, CAD s/p stents, carotid stenosis s/p carotid endarterectomy in 2018, PAD s/p b/l femoral atherectomies and other LE interventions who presented today for a surgical removal of mass and face/neck reconstruction surgery with ENT.  The patient procedure was postponed til tomorrow 1/4/21 due to OR delays.  The patient was admitted to the SICU with plans for surgery tomorrow.    Interval history:  Patient was stepped down 1/9/22 to the floor. During am ENT rounds on 1/10/22 patient was found down with blood on the floor. Patient was down for an unknown amount of time. New abrasions noted on left hand, left forearm and left lateral arm. Patient reported hitting his head. He was aaox3. Flap assessed to be unchanged from previous exam.  ABG revealed PO2 58. Stat CT Head without contrast and EKG ordered.

## 2022-01-04 NOTE — CARE UPDATE
Post-PEG tube placement instructions:  Leave PEG tube to gravity after surgery.  Ok to use tube for medications 6 hours after surgery. Clamp tube for 30 mins after medication administration.  Tube feeds to be started 24 hrs after PEG tube has been placed.  Please call for bleeding or malfunction of PEG tube.    Channing Vitale, PGY 3  General Surgery

## 2022-01-04 NOTE — PROGRESS NOTES
[facility] Int[facility]  Otorhinolaryngology-Head & Neck Surgery  Progress Note    Subjective:     Post-Op Info:  Procedure(s) (LRB):  CREATION, FREE FLAP (N/A)  GLOSSECTOMY, PARTIAL (N/A)  TRACHEOTOMY (N/A)   Day of Surgery  Hospital Day: 2     Interval History: NAEON. Ready for surgery today.     Medications:  Continuous Infusions:  Scheduled Meds:   amLODIPine  10 mg Oral Daily    atorvastatin  20 mg Oral Daily    cloNIDine  0.1 mg Oral BID    fluticasone furoate  1 puff Inhalation Daily    hydrALAZINE  25 mg Oral TID    LIDOcaine (PF) 10 mg/ml (1%)  1 mL Other Once    metoprolol succinate  200 mg Oral BID    tiotropium bromide  2 puff Inhalation Daily     PRN Meds:acetaminophen, albuterol-ipratropium, melatonin, ondansetron, oxyCODONE, sodium chloride 0.9%     Review of patient's allergies indicates:  No Known Allergies  Objective:     Vital Signs (24h Range):  Temp:  [97.9 °F (36.6 °C)-98.6 °F (37 °C)] 98.5 °F (36.9 °C)  Pulse:  [64-78] 71  Resp:  [13-30] 21  SpO2:  [93 %-100 %] 100 %  BP: (107-131)/(55-74) 129/68       Lines/Drains/Airways     Peripheral Intravenous Line                 Peripheral IV - Single Lumen 01/03/22 1307 20 G Anterior;Proximal;Right Forearm <1 day         Peripheral IV - Single Lumen 01/04/22 0516 18 G Right Antecubital <1 day                Physical Exam   General: AOx3, NAD   Respiratory:  Symmetric chest rise, normal effort  Oral Cavity:  Oral Tongue mobile.  Roughly 3.5 cm ulcerated lesion of the left anterior/lateral tongue.  There are several mm palpable induration surrounding it.. Hard Palate WNL. No buccal or FOM lesions.  Oropharynx:  No masses/lesions of the posterior pharyngeal wall. Tonsillar fossa without lesions. Soft palate without masses. Midline uvula.   Neck: No scars.  Roughly 3 cm firm, minimally mobile left submental/submandibular adenopathy.  No thyromegaly or thyroid nodules.  Normal range of motion.    Face: House Brackmann I bilaterally.      Significant Labs:  CBC:   Recent Labs   Lab 01/04/22 0405   WBC 12.30   RBC 3.37*   HGB 10.4*   HCT 31.6*      MCV 94   MCH 30.9   MCHC 32.9     CMP:   Recent Labs   Lab 01/04/22 0405   GLU 90   CALCIUM 9.1   ALBUMIN 3.2*   PROT 5.9*   *   K 4.1   CO2 23   CL 98   BUN 6*   CREATININE 0.6   ALKPHOS 66   ALT 11   AST 15   BILITOT 0.3     Coagulation:   Recent Labs   Lab 01/04/22 0405   LABPROT 10.3   INR 0.9   APTT 29.3       Significant Diagnostics:  I have reviewed all pertinent imaging results/findings within the past 24 hours.    Assessment/Plan:     * Squamous cell cancer of tongue  71M with [id number] SCC of the oral tongue.     -to OR for surgery today         Annmarie Oliveira MD  Otorhinolaryngology-Head & Neck Surgery  [facility]

## 2022-01-04 NOTE — SUBJECTIVE & OBJECTIVE
Interval History: NAEON. Ready for surgery today.     Medications:  Continuous Infusions:  Scheduled Meds:   amLODIPine  10 mg Oral Daily    atorvastatin  20 mg Oral Daily    cloNIDine  0.1 mg Oral BID    fluticasone furoate  1 puff Inhalation Daily    hydrALAZINE  25 mg Oral TID    LIDOcaine (PF) 10 mg/ml (1%)  1 mL Other Once    metoprolol succinate  200 mg Oral BID    tiotropium bromide  2 puff Inhalation Daily     PRN Meds:acetaminophen, albuterol-ipratropium, melatonin, ondansetron, oxyCODONE, sodium chloride 0.9%     Review of patient's allergies indicates:  No Known Allergies  Objective:     Vital Signs (24h Range):  Temp:  [97.9 °F (36.6 °C)-98.6 °F (37 °C)] 98.5 °F (36.9 °C)  Pulse:  [64-78] 71  Resp:  [13-30] 21  SpO2:  [93 %-100 %] 100 %  BP: (107-131)/(55-74) 129/68       Lines/Drains/Airways     Peripheral Intravenous Line                 Peripheral IV - Single Lumen 01/03/22 1307 20 G Anterior;Proximal;Right Forearm <1 day         Peripheral IV - Single Lumen 01/04/22 0516 18 G Right Antecubital <1 day                Physical Exam   General: AOx3, NAD   Respiratory:  Symmetric chest rise, normal effort  Oral Cavity:  Oral Tongue mobile.  Roughly 3.5 cm ulcerated lesion of the left anterior/lateral tongue.  There are several mm palpable induration surrounding it.. Hard Palate WNL. No buccal or FOM lesions.  Oropharynx:  No masses/lesions of the posterior pharyngeal wall. Tonsillar fossa without lesions. Soft palate without masses. Midline uvula.   Neck: No scars.  Roughly 3 cm firm, minimally mobile left submental/submandibular adenopathy.  No thyromegaly or thyroid nodules.  Normal range of motion.    Face: House Brackmann I bilaterally.     Significant Labs:  CBC:   Recent Labs   Lab 01/04/22  0405   WBC 12.30   RBC 3.37*   HGB 10.4*   HCT 31.6*      MCV 94   MCH 30.9   MCHC 32.9     CMP:   Recent Labs   Lab 01/04/22  0405   GLU 90   CALCIUM 9.1   ALBUMIN 3.2*   PROT 5.9*   *   K  4.1   CO2 23   CL 98   BUN 6*   CREATININE 0.6   ALKPHOS 66   ALT 11   AST 15   BILITOT 0.3     Coagulation:   Recent Labs   Lab 01/04/22  0405   LABPROT 10.3   INR 0.9   APTT 29.3       Significant Diagnostics:  I have reviewed all pertinent imaging results/findings within the past 24 hours.

## 2022-01-04 NOTE — TRANSFER OF CARE
"Anesthesia Transfer of Care Note    Patient: Fareed Richard Jr.    Procedure(s) Performed: Procedure(s) (LRB):  GLOSSECTOMY (N/A)  TRACHEOTOMY (N/A)  EGD, WITH PEG TUBE INSERTION (N/A)  CREATION, FREE FLAP (Right)  DISSECTION, NECK (Bilateral)    Patient location: ICU    Anesthesia Type: general    Transport from OR: Transported from OR on 6-10 L/min O2 by face mask with adequate spontaneous ventilation. Continuous ECG monitoring in transport. Continuous SpO2 monitoring in transport. Continuos invasive BP monitoring in transport    Post pain: adequate analgesia    Post assessment: no apparent anesthetic complications and tolerated procedure well    Post vital signs: stable    Level of consciousness: sedated    Nausea/Vomiting: no nausea/vomiting    Complications: none    Transfer of care protocol was followed      Last vitals:   Visit Vitals  BP (!) 67/48   Pulse 94   Temp 37.5 °C (99.5 °F) (Axillary)   Resp 16   Ht 5' 8" (1.727 m)   Wt 57.7 kg (127 lb 3.3 oz)   SpO2 95%   BMI 19.34 kg/m²     "

## 2022-01-04 NOTE — PLAN OF CARE
"[facility]  Initial Discharge Assessment       Primary Care Provider: Kodi Tubbs MD    Admission Diagnosis: Mass of tongue [K14.8]  Tongue cancer [C02.9]    Admission Date: 1/3/2022  Expected Discharge Date:     Discharge Barriers Identified: None    Payor: [employer] / Plan: [employer] 65 / Product Type: Medicare Advantage /     Extended Emergency Contact Information  Primary Emergency Contact: Cat Richard  Address: [address] [place] [id number] [facility]  Home Phone: [phone]  Mobile Phone: [phone]  Relation: Spouse    Discharge Plan A: Home Health  Discharge Plan B: Home with family      [id number] - [place], [place] - [address]  [address]  [place] [address]  Phone: [phone] Fax: [phone]    [id number] - [place], [place] - [address]  [address]  [place] [address]  Phone: [phone] Fax: [phone]      Initial Assessment (most recent)     Adult Discharge Assessment - 01/04/22 1042        Discharge Assessment    Assessment Type Discharge Planning Assessment     Confirmed/corrected address, phone number and insurance Yes     Confirmed Demographics Correct on Facesheet     Source of Information family     If unable to respond/provide information was family/caregiver contacted? Yes     Contact Name/Number Jovani Richard [phone]     When was your last doctors appointment? --   "Within 3 months"    Communicated NISA with patient/caregiver Date not available/Unable to determine     Reason For Admission Squamous cell cancer of the tongue     Lives With spouse     Facility Arrived From: Home     Do you expect to return to your current living situation? Yes     Do you have help at home or someone to help you manage your care at home? Yes     Who are your caregiver(s) and their phone number(s)? Jovani Richard [phone]     Prior to hospitilization " cognitive status: Unable to Assess     Current cognitive status: Unable to Assess     Walking or Climbing Stairs Difficulty none     Dressing/Bathing Difficulty none     Home Accessibility not wheelchair accessible     Home Layout Able to live on 1st floor     Equipment Currently Used at Home walker, standard;wheelchair;shower chair     Readmission within 30 days? No     Patient currently being followed by outpatient case management? No     Do you currently have service(s) that help you manage your care at home? No     Do you take prescription medications? Yes     Do you have prescription coverage? Yes     Coverage PHN     Do you have any problems affording any of your prescribed medications? No     Is the patient taking medications as prescribed? yes     Who is going to help you get home at discharge? Wife Maame Richard [phone]     How do you get to doctors appointments? family or friend will provide     Are you on dialysis? No     Do you take coumadin? No     Discharge Plan A Home Health     Discharge Plan B Home with family     DME Needed Upon Discharge  --   TBD    Discharge Plan discussed with: Spouse/sig other     Name(s) and Number(s) Wife Maame Richard [phone]     Discharge Barriers Identified None        Relationship/Environment    Name(s) of Who Lives With Patient Wife Maame Richard [phone]               Pt lives with his wife, he was independent prior to hospitalization. He has DME at home, but is not using it at this time. SW will follow.    SADIE Carmona LMSW  [facility]  [id number]

## 2022-01-04 NOTE — BRIEF OP NOTE
[facility]  Brief Operative Note    SUMMARY     Surgery Date: 1/4/2022     Surgeon(s) and Role:  Panel 1:     * Naeem Smalls MD - Primary     * Annmarie Oliveira MD - Resident - Chief  Panel 2:     * Anthony Calabrese MD - Primary     * Channing Vitale MD - Resident - Assisting     * Nikita Glass MD - Resident - Chief  Panel 3:     * Stacey Conde MD - Primary        Pre-op Diagnosis:  Tongue cancer [C02.9]    Post-op Diagnosis:  Post-Op Diagnosis Codes:     * Tongue cancer [C02.9]    Procedure(s) (LRB):  GLOSSECTOMY (N/A)  TRACHEOTOMY (N/A)  EGD, WITH PEG TUBE INSERTION (N/A)  CREATION, FREE FLAP (Right)  DISSECTION, NECK (Bilateral)    Anesthesia: General    Operative Findings: total glossectomy, bilateral neck dissections I-IV, ALTFF reconstruction, tracheostomy, cervicofacial advancement flaps, G tube (per gen surg)     Estimated Blood Loss: * No values recorded between 1/4/2022  7:35 AM and 1/4/2022  4:12 PM *    Estimated Blood Loss has not been documented. EBL = 300 ml.         Specimens:   Specimen (24h ago, onward)             Start     Ordered    01/04/22 1518  Specimen to Pathology, Surgery ENT  Once        Comments: Pre-op Diagnosis: Tongue cancer [C02.9]    Post-op Diagnosis: Same     Procedure(s):  GLOSSECTOMY  TRACHEOTOMY  EGD, WITH PEG TUBE INSERTION  CREATION, FREE FLAP  DISSECTION, NECK     Number of specimens: 11    Name of specimens:   1. Superior skin margin - Frozen  2. Anterior skin margin - Frozen  3. Inferior skin margin - Frozen  4. Posterior skin margin - Frozen  5. Left neck dissection levels 2, 3, and 4 - Permanent  6. Anterior skin margin - Permanent  7. New anterior skin margin - Frozen  8. Right neck dissection levels 2, 3, and 4 - Permanent  9. Right neck dissection level 1B - Permanent  10. Left lingual nerve - Frozen  11. Total glossectomy, left neck dissection level 1B with cervical skin - Frozen   References:    Click here for ordering  Quick Tip   Question Answer Comment   Procedure Type: ENT    Specimen Class: Known or suspected malignancy        01/04/22 1518                [id number]

## 2022-01-04 NOTE — ANESTHESIA POSTPROCEDURE EVALUATION
Anesthesia Post Evaluation    Patient: Fareed Richard Jr.    Procedure(s) Performed: Procedure(s) (LRB):  GLOSSECTOMY (N/A)  TRACHEOTOMY (N/A)  EGD, WITH PEG TUBE INSERTION (N/A)  CREATION, FREE FLAP (Right)  DISSECTION, NECK (Bilateral)    Final Anesthesia Type: general      Patient location during evaluation: ICU  Patient participation: Yes- Able to Participate  Level of consciousness: awake and alert and oriented  Post-procedure vital signs: reviewed and stable  Pain management: adequate  Airway patency: patent    PONV status at discharge: No PONV  Anesthetic complications: no      Cardiovascular status: hemodynamically stable and hypotensive  Respiratory status: unassisted, room air and spontaneous ventilation  Hydration status: euvolemic  Follow-up not needed.          Vitals Value Taken Time   BP 69/50 01/04/22 1732   Temp 37.5 °C (99.5 °F) 01/04/22 1630   Pulse 89 01/04/22 1746   Resp 14 01/04/22 1746   SpO2 90 % 01/04/22 1746   Vitals shown include unvalidated device data.      No case tracking events are documented in the log.      Pain/Ирина Score: Pain Rating Prior to Med Admin: 0 (1/4/2022  5:14 PM)

## 2022-01-04 NOTE — PLAN OF CARE
Pt AAOx4 - follows commands and moves all extremities. MAP within goal >65. HR NSR on bedside monitor. Sats 100% on 2L NC - pt reports being on home O2 at night. Afebrile. Pt reports pain to tongue/L neck - PRN tylenol administered and provided moderate relief per pt. Pt with L neck mass and lesions to tongue. Plan for OR this AM. Pt NPO since MN. Pre-op check list in progress, pt given CHG bath, consents in chart. Pt and pt's wife updated on and agreeable to plan of care.

## 2022-01-04 NOTE — ANESTHESIA PROCEDURE NOTES
Intubation    Date/Time: 1/4/2022 7:23 AM  Performed by: Martin Lagos MD  Authorized by: Belia Armijo MD     Intubation:     Induction:  Intravenous    Intubated:  Postinduction    Mask Ventilation:  Easy with oral airway    Attempts:  1    Attempted By:  Resident anesthesiologist    Method of Intubation:  Video laryngoscopy    Blade:  Ames 3    Laryngeal View Grade: Grade I - full view of cords      Difficult Airway Encountered?: No      Complications:  None    Airway Device:  Oral endotracheal tube    Airway Device Size:  7.5    Style/Cuff Inflation:  Cuffed (inflated to minimal occlusive pressure)    Tube secured:  24    Secured at:  The lips    Placement Verified By:  Capnometry    Complicating Factors:  None    Findings Post-Intubation:  BS equal bilateral

## 2022-01-04 NOTE — ANESTHESIA PREPROCEDURE EVALUATION
[facility]  Anesthesia Pre-Operative Evaluation         Patient Name: Fareed Richard Jr.  YOB: 1950  MRN: [id number]    SUBJECTIVE:     Pre-operative Evaluation for Procedure(s) (LRB):  CREATION, FREE FLAP (N/A)  GLOSSECTOMY, PARTIAL (N/A)  TRACHEOTOMY (N/A)     01/03/2022    Fareed Richard Jr. is a 71 y.o. male with a PMHx significant for HTN, CAD s/p stents, carotid stenosis s/p carotid endarterectomy (2018), PAD s/p bilateral femoral atherectomy and multiple lower extremity interventions last in 2018, who was evaluated by Dr. Smalls for left-sided tongue lesion and cervical adenopathy. Fine-needle aspiration revealing of squamous cell carcinoma.     He now presents for the above procedure(s).    Will also require free flap reconstruction likely with an anterolateral thigh free flap, tracheostomy and PEG tube placement    Previous Airway: None documented.    LDA:       Peripheral IV - Single Lumen 11/02/19 0234 20 G Left Wrist (Active)   Number of days: 793            Peripheral IV - Single Lumen 11/02/19 20 G Right Forearm (Active)   Number of days: 793            Peripheral IV - Single Lumen 01/03/22 1307 20 G Anterior;Proximal;Right Forearm (Active)       Drips: None documented.      Patient Active Problem List   Diagnosis    Peripheral vascular disease    Carotid stenosis    Mass of tongue    Submental adenopathy    Squamous cell cancer of tongue    Coronary artery disease involving native coronary artery of native heart without angina pectoris    Primary hypertension    Other hyperlipidemia    Pre-operative cardiovascular examination       Review of patient's allergies indicates:  No Known Allergies    Current Inpatient Medications:      Current Outpatient Medications   Medication Instructions    amLODIPine (NORVASC) 10 mg, Oral, Daily    aspirin (ECOTRIN) 81 mg, Oral, Daily    atorvastatin (LIPITOR) 20 mg, Oral, Daily    clopidogreL (PLAVIX) 75 mg, Oral, Daily     co-enzyme Q-10 50 mg, Oral, Daily    hydrALAZINE (APRESOLINE) 25 mg, Oral, 3 times daily    losartan (COZAAR) 100 MG tablet 1 tablet, Oral, Daily    metoprolol succinate (TOPROL-XL) 200 mg, Oral, 2 times daily    multivitamin capsule 1 capsule, Oral, Daily    ondansetron (ZOFRAN-ODT) 4 mg, Oral, As needed (PRN)    umeclidinium-vilanteroL (ANORO ELLIPTA) 62.5-25 mcg/actuation DsDv 1 puff, Inhalation, Daily, Controller    VENTOLIN HFA 90 mcg/actuation inhaler 2 puffs, Inhalation, Every 4 hours PRN       Past Surgical History:   Procedure Laterality Date    ABDOMINAL SURGERY      stents placed in liver and large intestines, per patient    CAROTID STENT      CORONARY STENT PLACEMENT  01/2000    EYE SURGERY      Cataract bilateral    femoral stents      bilateral    SKIN CANCER EXCISION         Social History     Substance and Sexual Activity   Drug Use No     Tobacco Use: Medium Risk    Smoking Tobacco Use: Former Smoker    Smokeless Tobacco Use: Never Used     Alcohol Use: Not on file         OBJECTIVE:     Vital Signs Range (Last 24H):  Temp:  [36.6 °C (97.9 °F)]   Pulse:  [64]   Resp:  [17]   BP: (116)/(57)   SpO2:  [99 %]       Significant Labs    Heme Profile  Lab Results   Component Value Date    WBC 17.46 (H) 12/30/2021    HGB 10.4 (L) 12/30/2021    HCT 31.3 (L) 12/30/2021     12/30/2021       Coagulation Studies  Lab Results   Component Value Date    LABPROT 10.0 12/27/2018    INR 1.0 12/27/2018       BMP  Lab Results   Component Value Date     (L) 12/30/2021    K 5.0 12/30/2021    CL 95 12/30/2021    CO2 24 12/30/2021    BUN 21 12/30/2021    CREATININE 0.7 12/30/2021    MG 2.0 11/02/2019       Liver Function Tests  Lab Results   Component Value Date    AST 32 11/02/2019    ALT 33 11/02/2019    ALKPHOS 77 11/02/2019    BILITOT 0.4 11/02/2019    PROT 6.8 11/02/2019    ALBUMIN 3.2 (L) 11/02/2019       Lipid Profile  No results found for: CHOL, HDL, LDLDIRECT, TRIG    Endocrine  Profile  Lab Results   Component Value Date    TSH 1.73 12/06/2016       Diagnostic Studies    CT SOFT TISSUE NECK WITH CONTRAST (12/6/21)  Upper Airways and Lungs: Trachea is patent and midline.     Impression:  Enhancing mass in the base of the left tongue with an adjacent left submental enlarged, enhancing lymph node.  Findings are concerning for malignancy.   Consultation with ENT is recommended.  Findings consistent with centrilobular and paraseptal emphysema.  Approximately 50% stenosis of the right common carotid artery and right ICA.  Approximately 70% stenosis of the left ICA.    CT CHEST WITHOUT CONTRAST (12/16/21)  Impression:  Mildly enlarged pretracheal lymph node (1.3 cm).  Moderate emphysematous lung changes.  Extensive scattered calcific atherosclerosis.  Dilatation of the descending thoracic aorta (3.9 cm).    Cardiac Studies    EKG:   Results for orders placed or performed during the hospital encounter of 11/02/19   EKG 12-lead    Collection Time: 11/02/19  2:49 AM    Narrative    Test Reason : R06.02,    Vent. Rate : 076 BPM     Atrial Rate : 076 BPM     P-R Int : 170 ms          QRS Dur : 100 ms      QT Int : 404 ms       P-R-T Axes : 083 -58 081 degrees     QTc Int : 454 ms    Normal sinus rhythm  Left axis deviation  Incomplete right bundle branch block  Septal infarct ,age undetermined  Abnormal ECG  When compared with ECG of 27-DEC-2018 13:44,  Significant changes have occurred  Confirmed by Tacos Benitez MD ([id number]) on 11/5/2019 7:42:10 PM    Referred By: [email or web address]   SELF           Confirmed By:Tacos Benitez MD       TTE (12/30/21):  Interpretation Summary  · The left ventricle is normal in size with mild concentric hypertrophy and normal systolic function.  · The estimated ejection fraction is 65%.  · Normal left ventricular diastolic function.  · Normal right ventricular size with normal right ventricular systolic function.  · Normal central venous pressure (3 mmHg).  · The sinuses of  Valsalva is dilated and measures 4.1 cm.    ANJEL  No results found for this or any previous visit.    ASSESSMENT/PLAN:       Anesthesia Evaluation    I have reviewed the Patient Summary Reports.    I have reviewed the Nursing Notes. I have reviewed the NPO Status.   I have reviewed the Medications.     Review of Systems  Anesthesia Hx:  Denies Hx of Anesthetic complications  History of prior surgery of interest to airway management or planning: Previous anesthesia: General BILATERAL FEMORAL STENTS, LEFT ARE PSUEDOANEURYSM RUPTURE REPAIR ~2017 with general anesthesia.  Denies Family Hx of Anesthesia complications.   Denies Personal Hx of Anesthesia complications.   Social:  Former Smoker, Alcohol Use 2-3 BEERS/DAY   Hematology/Oncology:  Hematology Normal      Current/Recent Cancer. --  Cancer in past history:  surgery  Oncology Comments: S/P SURGICAL REMOVAL OF CHEEK TUMOR WITH FLAP FROM NECK. S/P SURGICAL REMOVAL OF RIGHT FOREARM SKIN CANCER WITH SKIN GRAFT FROM LEFT THIGH. S/P LIP TUMOR REMOVAL.     EENT/Dental:   MASS OF TONGUE. EDENTULOUS.   Cardiovascular:   Hypertension CAD    Denies Angina. hyperlipidemia S/P SURGICAL REPAIR OF PSEUDOANEURYSM OF LEFT ARM 2017. Functional Capacity 3 METS  Coronary Artery Disease: S/P Percutaneous Coronary Intervention (PCI) coronary stent, unknown type, currently on aspirin, currently on clopidogrel (Plavix).  Peripheral Arterial Disease MESENTERIC ARTERY STENOSIS. S/P BILATERAL FEMORAL STENTS.  Carotoid Artery Disease (S/P CAROTID STENT), s/p percutaneous intervention    Pulmonary:   COPD Denies Shortness of breath.  Denies Recent URI.    Renal/:  Renal/ Normal     Hepatic/GI:  Hepatic/GI Normal    Musculoskeletal:  Musculoskeletal Normal    Neurological:  Neurology Normal    Endocrine:  Endocrine Normal    Psych:  Psychiatric Normal           Physical Exam  General:  Cachexia    Airway/Jaw/Neck:  Airway Findings: Mouth Opening: Normal General Airway Assessment: Adult   Oropharynx Findings:  Abnormal Mass Lesion of left anterior/lateral tongue, uvula midline, tonsilar fossa unremarkable  Mallampati: II  Improves to II with phonation.  TM Distance: Normal, at least 6 cm  Jaw/Neck Findings:  Neck ROM: Normal ROM      Dental:  Dental Findings:   Chest/Lungs:  Chest/Lungs Clear    Heart/Vascular:  Heart Findings: Normal       Mental Status:  Mental Status Findings:  Cooperative, Alert and Oriented         Anesthesia Plan  Type of Anesthesia, risks & benefits discussed:  Anesthesia Type:  general    Patient's Preference:   Plan Factors:          Intra-op Monitoring Plan: arterial line and standard ASA monitors  Intra-op Monitoring Plan Comments:   Post Op Pain Control Plan: multimodal analgesia, IV/PO Opioids PRN and per primary service following discharge from PACU  Post Op Pain Control Plan Comments:     Induction:   IV  Beta Blocker:         Informed Consent: Patient understands risks and agrees with Anesthesia plan.  Questions answered. Anesthesia consent signed with patient.  ASA Score: 3     Day of Surgery Review of History & Physical:    H&P update referred to the surgeon.         Ready For Surgery From Anesthesia Perspective.

## 2022-01-04 NOTE — H&P
[facility]  Critical Care - Surgery  History & Physical    Patient Name: Fareed Richard Jr.  MRN: [id number]  Admission Date: 1/3/2022  Code Status: Full Code  Attending Physician: Dr. Darby  Primary Care Provider: Kodi Tubbs MD   Principal Problem: Squamous cell cancer of tongue    Subjective:     HPI:  Fareed Richard Jr. is a 71 y.o. M with hx of squamous cell carcinoma, HTN, CAD s/p stents, carotid stenosis s/p carotid endarterectomy in 2018, PAD s/p b/l femoral atherectomies and other LE interventions who presented today for a surgical removal of mass and face/neck reconstruction surgery with ENT.  The patient procedure was postponed til tomorrow 1/4/21 due to OR delays.  The patient was admitted to the SICU with plans for surgery tomorrow.      Hospital/ICU Course:  No notes on file    Planned procedure for tomorrow:  GLOSSECTOMY, PARTIAL (Left)  DISSECTION, NECK, RADICAL (Left)  CREATION, FREE FLAP (N/A)    Procedure planned for 1/4/21     Past Medical History:   Diagnosis Date    Cancer     skin to Rt forearm and face    COPD (chronic obstructive pulmonary disease)     Hyperlipidemia     Hypertension     Pseudoaneurysm        Past Surgical History:   Procedure Laterality Date    ABDOMINAL SURGERY      stents placed in liver and large intestines, per patient    CAROTID STENT      CORONARY STENT PLACEMENT  01/2000    EYE SURGERY      Cataract bilateral    femoral stents      bilateral    SKIN CANCER EXCISION         Review of patient's allergies indicates:  No Known Allergies    Family History    None       Tobacco Use    Smoking status: Former Smoker     Packs/day: 2.00     Years: 40.00     Pack years: 80.00     Types: Cigarettes     Start date: 4/17/1963     Quit date: 4/17/2018     Years since quitting: 3.7    Smokeless tobacco: Never Used    Tobacco comment: 3/3 ppd x 40 yrs. Currently 3-4 cigarettes daily .He is trying  to quit. Is using a Vapor cigarettes  2-3  x's a day.   Substance and Sexual Activity    Alcohol use: Yes     Alcohol/week: 3.0 standard drinks     Types: 3 Cans of beer per week     Comment: beer daily 3-4    Drug use: No    Sexual activity: Not Currently      Review of Systems   Constitutional: Negative for chills, diaphoresis, fatigue and fever.   HENT:        Large mass on left side of jaw and neck   Respiratory: Negative for chest tightness, shortness of breath and wheezing.    Gastrointestinal: Negative for abdominal pain, nausea and vomiting.   Musculoskeletal: Positive for neck pain (2/2 mass). Negative for neck stiffness.   Neurological: Negative for weakness, light-headedness and numbness.     Objective:     Vital Signs (Most Recent):  Temp: 98.6 °F (37 °C) (01/03/22 1900)  Pulse: 68 (01/03/22 2115)  Resp: 18 (01/03/22 2115)  BP: 118/60 (01/03/22 2100)  SpO2: 100 % (01/03/22 2115) Vital Signs (24h Range):  Temp:  [97.9 °F (36.6 °C)-98.6 °F (37 °C)] 98.6 °F (37 °C)  Pulse:  [64-78] 68  Resp:  [17-28] 18  SpO2:  [93 %-100 %] 100 %  BP: (116-127)/(57-70) 118/60     Weight: 57.7 kg (127 lb 3.3 oz)  Body mass index is 19.34 kg/m².      Intake/Output Summary (Last 24 hours) at 1/3/2022 2211  Last data filed at 1/3/2022 2100  Gross per 24 hour   Intake 28.68 ml   Output 300 ml   Net -271.32 ml       Physical Exam  Constitutional:       General: He is not in acute distress.     Appearance: Normal appearance. He is normal weight. He is not ill-appearing or diaphoretic.   HENT:      Head:      Comments: Patient has large mass over left jaw and neck     Nose: Nose normal.      Mouth/Throat:      Mouth: Mucous membranes are moist.      Comments: Uvula midline, white plaques on left side of tongue with underlying mass  Eyes:      Extraocular Movements: Extraocular movements intact.      Conjunctiva/sclera: Conjunctivae normal.      Pupils: Pupils are equal, round, and reactive to light.   Neck:      Comments: Left-sided large mass overlying jaw and  neck,  Cardiovascular:      Rate and Rhythm: Normal rate and regular rhythm.      Pulses: Normal pulses.      Heart sounds: Normal heart sounds.   Pulmonary:      Effort: Pulmonary effort is normal. No respiratory distress.      Breath sounds: Normal breath sounds. No wheezing or rhonchi.   Abdominal:      General: Abdomen is flat. There is no distension.      Palpations: Abdomen is soft.      Tenderness: There is no abdominal tenderness. There is no guarding or rebound.   Musculoskeletal:      Cervical back: Normal range of motion.   Skin:     General: Skin is warm and dry.      Capillary Refill: Capillary refill takes less than 2 seconds.   Neurological:      General: No focal deficit present.      Mental Status: He is alert and oriented to person, place, and time.         Lines/Drains/Airways     Peripheral Intravenous Line                 Peripheral IV - Single Lumen 11/02/19 0234 20 G Left Wrist 793 days         Peripheral IV - Single Lumen 11/02/19 20 G Right Forearm 793 days         Peripheral IV - Single Lumen 01/03/22 1307 20 G Anterior;Proximal;Right Forearm <1 day                Significant Labs:    CBC/Anemia Profile:  Recent Labs   Lab 01/03/22 2054   WBC 11.47   HGB 9.8*   HCT 29.9*      MCV 95   RDW 13.2        Chemistries:  Recent Labs   Lab 01/03/22 2054   *   K 3.9   CL 99   CO2 22*   BUN 7*   CREATININE 0.6   CALCIUM 8.6*   ALBUMIN 2.9*   PROT 5.4*   BILITOT 0.3   ALKPHOS 57   ALT 11   AST 12       Significant Imaging: I have reviewed all pertinent imaging results/findings within the past 24 hours.    Assessment/Plan:     * Squamous cell cancer of tongue  Fareed Richard Jr. is a 71 y.o. M with hx of squamous cell carcinoma, HTN, CAD s/p stents, carotid stenosis s/p carotid endarterectomy, PAD s/p b/l femoral atherectomies and other LE interventions who was admitted to SICU and will undergo glossectomy and face/neck reconstruction with ENT on 1/4/21.    Neuro  - no sedation, A&Ox4  -  pain control: tylenol, oxycodone prn     CV  #CAD s/p stents  #Carotid stenosis s/p endarterectomy  #PVS s/p multiple LE interventions including b/l femoral arthetectomies  - continue atorv, holding ASA and plavix per ENT for now    #HTN  - continue metoprolol and clonidine  - holding losartan prior to surgery     Pulm  #COPD  #Former smoker  - venotlin inhaler  - duonebs prn    GI  - no active issues     F: none  E: replete prn  N: NPO at midnight for surgery tomorrow    Renal  No active issues    Heme/Onc  #Squamous Cell Carcinoma of tongue   - Patient will undergo glossectomy and face/neck reconstruction with flap with ENT tomorrow, 1/4/21  - NPO at midnight  - tylenol for pain     Endo  -no active issues     Psych  #alcohol use  - patient reports drinking 3-4 beers per night, last drink was 1/1  - no histrory of withdrawal Sx or Sz  - CIWA protocol         Critical care was time spent personally by me on the following activities: development of treatment plan with patient or surrogate and bedside caregivers, discussions with consultants, evaluation of patient's response to treatment, examination of patient, ordering and performing treatments and interventions, ordering and review of laboratory studies, ordering and review of radiographic studies, pulse oximetry, re-evaluation of patient's condition.  This critical care time did not overlap with that of any other provider or involve time for any procedures.     Brenden Dalton, DO  Critical Care - Surgery  [facility]

## 2022-01-04 NOTE — ANESTHESIA PROCEDURE NOTES
Arterial    Diagnosis: Hypotension    Patient location during procedure: done in OR  Procedure start time: 1/4/2022 7:40 AM  Timeout: 1/4/2022 7:40 AM  Procedure end time: 1/4/2022 7:50 AM    Staffing  Authorizing Provider: Belia Armijo MD  Performing Provider: Martin Lagos MD    Anesthesiologist was present at the time of the procedure.    Preanesthetic Checklist  Completed: patient identified, IV checked, site marked, risks and benefits discussed, surgical consent, monitors and equipment checked, pre-op evaluation, timeout performed and anesthesia consent givenArterial  Skin Prep: chlorhexidine gluconate and isopropyl alcohol  Local Infiltration: none  Orientation: left  Location: radial    Catheter Size: 20 G  Catheter placement by Ultrasound guidance. Heme positive aspiration all ports.  Vessel Caliber: small, patent, compressibility normal  Needle advanced into vessel with real time Ultrasound guidance.Insertion Attempts: 2  Assessment  Dressing: secured with tape and tegaderm  Patient: Tolerated well

## 2022-01-04 NOTE — OP NOTE
Operative Note     1/3/2022    PRE-OP DIAGNOSIS: Tongue cancer [C02.9]      POST-OP DIAGNOSIS: Post-Op Diagnosis Codes:     * Tongue cancer [C02.9]    Procedure(s):  CREATION, FREE FLAP  GLOSSECTOMY, PARTIAL  TRACHEOTOMY  EGD, WITH PEG TUBE INSERTION     SURGEON: Surgeon(s) and Role:  Panel 1:     * Naeem Smalls MD - Primary     * Annmarie Oliveira MD - Resident - Chief  Panel 2:     * Anthony Calabrese MD - Primary     * Channing Vitale MD - Resident - Assisting     * Nikita Glass MD - Resident - Chief    ANESTHESIA: General     OPERATIVE FINDINGS: PEG tube placed.  Good transillumination.  Bumper 3cm at the skin.    INDICATION FOR PROCEDURE: This patient presents with a history of squamous cell carcinoma who presents for resection and reconstruction by ENT surgery in need of long term feeding access.    PROCEDURE IN DETAIL:  Fareed Richard Jr. is a 71 y.o. male brought to the operating room for definitive surgery of long term feeding access.  The patient has elected to undergo PEG placement. The patient was informed of the possible risks and complications of the procedure, including but not limited to anesthetic risks, bleeding, infection, and need for additional surgery.  The patient concurred with the proposed plan, and has given informed consent.  The site of surgery was properly noted/marked in the preoperative holding area.    The patient was then brought to the operating room and placed in the supine position with both upper extremities extended.  general anesthesia was administered. Perioperative antibiotics were administered consisting of Unasyn and a time out was performed confirming the patient, site, and procedure.   The abdomen was then prepped and draped in the usual sterile fashion.    We then directed our attention to the left upper quadrant for gastrostomy tube placement. The patient's abdomen was prepped and draped. An upper endoscope was introduced into the oropharynx and guided  down into the esophagus and stomach. The stomach was insufflated with air. We intubated the duodenum and examined the first and second portions; no abnormalities were noted. We withdrew the endoscope into the stomach and identified an appropriate position for gastrostomy tube placement 2 finger-breadths below the left subcostal margin. Palpation of the anterior abdominal wall at this point was visualized endoscopically and transillumination from the endoscope was visualized through the anterior abdominal wall. We made a 1.5 cm transverse skin incision. At this point, the stomach was cannulated with a catheter loaded on a needle. This was grasped by a snare which had been passed through the endoscope. The needle was removed, and a guidewire was placed, and the snare was used to grasp the guidewire. The endoscope, snare, and guidewire were all withdrawn from the patient's mouth. A 20-Maori gastrostomy tube was loaded onto the guidewire and pulled through the anterior abdominal wall via Seldinger technique. Repeat endoscopy was performed with the gastrostomy tube at the 3 cm huan at the skin. There was no blanching of the gastric mucosa, and when the tube was twisted, the button did not grab the mucosa. The insufflation in the stomach was evacuated, and the endoscope was removed. The gastrostomy tube was secured in place using the supplied devices and connected to a bag for gravity drainage.    ESTIMATED BLOOD LOSS: Minimal    COMPLICATIONS: None    DISPOSITION: Per ENT, ICU

## 2022-01-04 NOTE — SUBJECTIVE & OBJECTIVE
Planned procedure for tomorrow:  GLOSSECTOMY, PARTIAL (Left)  DISSECTION, NECK, RADICAL (Left)  CREATION, FREE FLAP (N/A)    Procedure planned for 1/4/21     Past Medical History:   Diagnosis Date    Cancer     skin to Rt forearm and face    COPD (chronic obstructive pulmonary disease)     Hyperlipidemia     Hypertension     Pseudoaneurysm        Past Surgical History:   Procedure Laterality Date    ABDOMINAL SURGERY      stents placed in liver and large intestines, per patient    CAROTID STENT      CORONARY STENT PLACEMENT  01/2000    EYE SURGERY      Cataract bilateral    femoral stents      bilateral    SKIN CANCER EXCISION         Review of patient's allergies indicates:  No Known Allergies    Family History    None       Tobacco Use    Smoking status: Former Smoker     Packs/day: 2.00     Years: 40.00     Pack years: 80.00     Types: Cigarettes     Start date: 4/17/1963     Quit date: 4/17/2018     Years since quitting: 3.7    Smokeless tobacco: Never Used    Tobacco comment: 3/3 ppd x 40 yrs. Currently 3-4 cigarettes daily .He is trying  to quit. Is using a Vapor cigarettes  2-3 x's a day.   Substance and Sexual Activity    Alcohol use: Yes     Alcohol/week: 3.0 standard drinks     Types: 3 Cans of beer per week     Comment: beer daily 3-4    Drug use: No    Sexual activity: Not Currently      Review of Systems   Constitutional: Negative for chills, diaphoresis, fatigue and fever.   HENT:        Large mass on left side of jaw and neck   Respiratory: Negative for chest tightness, shortness of breath and wheezing.    Gastrointestinal: Negative for abdominal pain, nausea and vomiting.   Musculoskeletal: Positive for neck pain (2/2 mass). Negative for neck stiffness.   Neurological: Negative for weakness, light-headedness and numbness.     Objective:     Vital Signs (Most Recent):  Temp: 98.6 °F (37 °C) (01/03/22 1900)  Pulse: 68 (01/03/22 2115)  Resp: 18 (01/03/22 2115)  BP: 118/60 (01/03/22  2100)  SpO2: 100 % (01/03/22 2115) Vital Signs (24h Range):  Temp:  [97.9 °F (36.6 °C)-98.6 °F (37 °C)] 98.6 °F (37 °C)  Pulse:  [64-78] 68  Resp:  [17-28] 18  SpO2:  [93 %-100 %] 100 %  BP: (116-127)/(57-70) 118/60     Weight: 57.7 kg (127 lb 3.3 oz)  Body mass index is 19.34 kg/m².      Intake/Output Summary (Last 24 hours) at 1/3/2022 2211  Last data filed at 1/3/2022 2100  Gross per 24 hour   Intake 28.68 ml   Output 300 ml   Net -271.32 ml       Physical Exam  Constitutional:       General: He is not in acute distress.     Appearance: Normal appearance. He is normal weight. He is not ill-appearing or diaphoretic.   HENT:      Head:      Comments: Patient has large mass over left jaw and neck     Nose: Nose normal.      Mouth/Throat:      Mouth: Mucous membranes are moist.      Comments: Uvula midline, white plaques on left side of tongue with underlying mass  Eyes:      Extraocular Movements: Extraocular movements intact.      Conjunctiva/sclera: Conjunctivae normal.      Pupils: Pupils are equal, round, and reactive to light.   Neck:      Comments: Left-sided large mass overlying jaw and neck,  Cardiovascular:      Rate and Rhythm: Normal rate and regular rhythm.      Pulses: Normal pulses.      Heart sounds: Normal heart sounds.   Pulmonary:      Effort: Pulmonary effort is normal. No respiratory distress.      Breath sounds: Normal breath sounds. No wheezing or rhonchi.   Abdominal:      General: Abdomen is flat. There is no distension.      Palpations: Abdomen is soft.      Tenderness: There is no abdominal tenderness. There is no guarding or rebound.   Musculoskeletal:      Cervical back: Normal range of motion.   Skin:     General: Skin is warm and dry.      Capillary Refill: Capillary refill takes less than 2 seconds.   Neurological:      General: No focal deficit present.      Mental Status: He is alert and oriented to person, place, and time.         Lines/Drains/Airways     Peripheral Intravenous Line                  Peripheral IV - Single Lumen 11/02/19 0234 20 G Left Wrist 793 days         Peripheral IV - Single Lumen 11/02/19 20 G Right Forearm 793 days         Peripheral IV - Single Lumen 01/03/22 1307 20 G Anterior;Proximal;Right Forearm <1 day                Significant Labs:    CBC/Anemia Profile:  Recent Labs   Lab 01/03/22 2054   WBC 11.47   HGB 9.8*   HCT 29.9*      MCV 95   RDW 13.2        Chemistries:  Recent Labs   Lab 01/03/22 2054   *   K 3.9   CL 99   CO2 22*   BUN 7*   CREATININE 0.6   CALCIUM 8.6*   ALBUMIN 2.9*   PROT 5.4*   BILITOT 0.3   ALKPHOS 57   ALT 11   AST 12       Significant Imaging: I have reviewed all pertinent imaging results/findings within the past 24 hours.

## 2022-01-04 NOTE — OP NOTE
Pre-operative Diagnosis:   Squamous cell carcinoma of tongue  Wound of the oral cavity     Post-operative Diagnosis:  Same     Procedures Performed:  1. Left anterolateral thigh free flap for closure of total glossectomy defect  2. Advancement flap for closure of left thigh donor site advanced area was 30 x 11 cm   3. Vestibuloplasty  4. Advancement flap flap for closure of neck defect advanced area 20x9 cm        Surgeon: Stacey Conde MD     Assistant(s):  MD Rocco Colunga MD      Anesthesia: General Endotracheal     EBL:  500 cc     Specimens:  None from my portion of the procedure     Findings:  Ischemia time was approximately 1 hour and 50 minutes.  The  artery was anastomosed to left facial artery.  The dominant vein was coupled with 3.5  to facial vein.  [employer] Doppler was placed on  the vein.     15 x 7 cm skin paddle was used to cover the oral tongue defect with portion of vastus musculature tacked up to mandible for bulk     Indications for Procedure:  Mr. Richard is a 71-year-old gentleman scheduled by Dr. Smalls for glossectomy with reconstruction with anterolateral thigh free flap.  I was available to assist with reconstructive portion.     Procedure in Detail:  After informed consent had been obtained from patient with risks and benefits reviewed patient was taken back to OR 14. Anesthesia proceeded with general endotracheal anesthesia.  Dr. Smalls proceeded with tracheostomy and glossectomy portions please see his dictations for detail.     Right thigh had been prepped and draped in usual fashion and I proceeded with marking incision from the anterior superior iliac spine to the lateral patella.  Two skin perforators were dopplered and a 15 x 7 cm skin paddle was fashioned centered around these 2 perforators with relaxing incisions along previously drawn line.  Using Bovie in cutting mode is skin was incised along the anterior portion of the skin paddle and relaxing  incisions.  This was deepened with cautery to the subcutaneous fat down to the crural fascia.  Fascia of the rectus femoris was entered and reflected laterally rectus femoris muscle was reflected medially and vastus lateralis came into view with vascular pedicle.  Doppler confirmation of vascular pedicle going into the  was undertaken at this point.  Once this was verified posterior skin paddle incision was done with cutting mode Bovie and then deepened as well with Bovie down to the fascia summer.  Fascia summer was divided and skin was tacked with 3-0 Vicryl suture to the fascia.  I then proceeded with LigaSure through vastus lateralis muscle taking care to leave cuff around  artery.  This was done with intermittent verification with the Doppler.  The nerve to the vastus was identified and divided and clipped.   artery was then traced medially just proximal to rectus branch.  Deeper vastus branches were divided with several large veins divided with 2-0 ties or medium clips.  Once pedicle with the artery and 2 veins were isolated I turned my attention to the neck for vessel preparation.     Facial artery was identified and stripped of adventitia with facial vein isolated.  This was placed on Acland clamp and divided distally with good flow from the artery on releasing of the clamp.  Facial vein was also placed on Acland clamp and divided and irrigated with heparinized saline.     At this point at turned back towards the left thigh and the  artery with smaller vein and dominant larger vein were ligated with clips or 2-0 ties.  Of note larger vein was not tied completely and retracted with some bleeding.  This was eventually controlled with clips and ties. Flap was brought up to the head neck area.  Flap system was irrigated with heparinized saline.  I turned my attention 1st to inset and vestibuloplasty.     The vascular pedicle was laid in tension-free manner and in proper  geometry without twisting.  The proximal end of the skin paddle was then secured with 3-0 Vicryl horizontal mattress sutures to the epiglottis then going bilaterally along the lateral pharyngeal wall to the retromolar trigone.  The transition was then undertaken to insetting into the vestibule with flap tacked to the gingival mucosa and remaining floor of mouth mucosa circumferentially to the anterior gingiva  all with 3-0 Vicryl horizontal mass buttress sutures.  This re-established the vestibule  with vestibuloplasty.       Microscope was brought in and attention was turned to microvascular anastomosis.  Facial artery was further cleaned of adventitia and the  artery as well these were placed on the mounting Acland with background and under high magnification end-to-end anastomosis was done with 9 0 nylon in usual fashion.  The flap vein was measured out to be 3.5 mm and a 3.5  was used to couple the facial vein to  vein.  Papaverine and lidocaine were used to irrigate the vascular system Acland clamps were removed and normal flow was noted in the artery and the vein.  [employer] internal Doppler was placed on both the artery and vein with good signal on both.     At this portion left thigh was closed with advancement flaps.  Wide undermining was undertaken with Bovie cautery above the crural fascia this was done medially over the rectus and laterally over the vastus lateralis.  Advancement flaps were created with total undermined area of 30 x 11 cm.  Two hundred fifteen round Anthony drains were placed and secured to the skin with silk suture.  The flaps were then closed with deep 3-0 Vicryl suture and staples for the skin.       Attention with this point was turned to closure of the neck.   again tissue rearrangement was needed with advancement flap from neck with wide undermining done onto the chest and laterally to level 5 with Bovie cautery.  The total advanced area was 20 x 9 cm.  The  right neck skin was then advanced to the midline of the through and through defect in the submental area with advancement of contralateral left-sided flap as well.  Both flaps were sewn together and to the remaining neck skin with deep 3-0 Vicryl and staples for the skin.      Fifteen Anthony drain was placed and the gutter below the sternocleidomastoid and secured to the skin with 2-0 silk suture. Wounds were cleaned attention was turned to neck closure and endotracheal tube exchange for tracheostomy. Reinforced endotracheal tube was removed and 6 shiley trach was placed and secured to the skin with 2-0 silk suture.  At this point patient was turned over to Anesthesia and taken to the ICU in stable condition     Complications:  None     Attestation:  I was present for the entire procedure

## 2022-01-05 LAB
ANION GAP SERPL CALC-SCNC: 10 MMOL/L (ref 8–16)
BASOPHILS # BLD AUTO: 0.02 K/UL (ref 0–0.2)
BASOPHILS NFR BLD: 0.2 % (ref 0–1.9)
BUN SERPL-MCNC: 8 MG/DL (ref 8–23)
CALCIUM SERPL-MCNC: 7.6 MG/DL (ref 8.7–10.5)
CHLORIDE SERPL-SCNC: 103 MMOL/L (ref 95–110)
CO2 SERPL-SCNC: 20 MMOL/L (ref 23–29)
CREAT SERPL-MCNC: 0.5 MG/DL (ref 0.5–1.4)
DIFFERENTIAL METHOD: ABNORMAL
EOSINOPHIL # BLD AUTO: 0 K/UL (ref 0–0.5)
EOSINOPHIL NFR BLD: 0 % (ref 0–8)
ERYTHROCYTE [DISTWIDTH] IN BLOOD BY AUTOMATED COUNT: 15 % (ref 11.5–14.5)
EST. GFR  (AFRICAN AMERICAN): >60 ML/MIN/1.73 M^2
EST. GFR  (NON AFRICAN AMERICAN): >60 ML/MIN/1.73 M^2
GLUCOSE SERPL-MCNC: 117 MG/DL (ref 70–110)
GLUCOSE SERPL-MCNC: 122 MG/DL (ref 70–110)
GLUCOSE SERPL-MCNC: 123 MG/DL (ref 70–110)
GLUCOSE SERPL-MCNC: 141 MG/DL (ref 70–110)
GLUCOSE SERPL-MCNC: 147 MG/DL (ref 70–110)
GLUCOSE SERPL-MCNC: 148 MG/DL (ref 70–110)
HCO3 UR-SCNC: 20.8 MMOL/L (ref 24–28)
HCO3 UR-SCNC: 22.1 MMOL/L (ref 24–28)
HCO3 UR-SCNC: 22.8 MMOL/L (ref 24–28)
HCO3 UR-SCNC: 23.6 MMOL/L (ref 24–28)
HCO3 UR-SCNC: 26.1 MMOL/L (ref 24–28)
HCT VFR BLD AUTO: 29 % (ref 40–54)
HCT VFR BLD CALC: 23 %PCV (ref 36–54)
HCT VFR BLD CALC: 24 %PCV (ref 36–54)
HCT VFR BLD CALC: 26 %PCV (ref 36–54)
HCT VFR BLD CALC: 27 %PCV (ref 36–54)
HCT VFR BLD CALC: 28 %PCV (ref 36–54)
HGB BLD-MCNC: 9.2 G/DL (ref 14–18)
IMM GRANULOCYTES # BLD AUTO: 0.06 K/UL (ref 0–0.04)
IMM GRANULOCYTES NFR BLD AUTO: 0.5 % (ref 0–0.5)
LYMPHOCYTES # BLD AUTO: 0.5 K/UL (ref 1–4.8)
LYMPHOCYTES NFR BLD: 3.9 % (ref 18–48)
MAGNESIUM SERPL-MCNC: 1.8 MG/DL (ref 1.6–2.6)
MCH RBC QN AUTO: 29.5 PG (ref 27–31)
MCHC RBC AUTO-ENTMCNC: 31.7 G/DL (ref 32–36)
MCV RBC AUTO: 93 FL (ref 82–98)
MONOCYTES # BLD AUTO: 1.4 K/UL (ref 0.3–1)
MONOCYTES NFR BLD: 10.4 % (ref 4–15)
NEUTROPHILS # BLD AUTO: 11.2 K/UL (ref 1.8–7.7)
NEUTROPHILS NFR BLD: 85 % (ref 38–73)
NRBC BLD-RTO: 0 /100 WBC
PCO2 BLDA: 41.8 MMHG (ref 35–45)
PCO2 BLDA: 46 MMHG (ref 35–45)
PCO2 BLDA: 48.1 MMHG (ref 35–45)
PCO2 BLDA: 50 MMHG (ref 35–45)
PCO2 BLDA: 50 MMHG (ref 35–45)
PH SMN: 7.27 [PH] (ref 7.35–7.45)
PH SMN: 7.28 [PH] (ref 7.35–7.45)
PH SMN: 7.3 [PH] (ref 7.35–7.45)
PH SMN: 7.3 [PH] (ref 7.35–7.45)
PH SMN: 7.33 [PH] (ref 7.35–7.45)
PHOSPHATE SERPL-MCNC: 2.7 MG/DL (ref 2.7–4.5)
PLATELET # BLD AUTO: 255 K/UL (ref 150–450)
PMV BLD AUTO: 8.3 FL (ref 9.2–12.9)
PO2 BLDA: 116 MMHG (ref 80–100)
PO2 BLDA: 128 MMHG (ref 80–100)
PO2 BLDA: 79 MMHG (ref 80–100)
PO2 BLDA: 82 MMHG (ref 80–100)
PO2 BLDA: 97 MMHG (ref 80–100)
POC BE: -3 MMOL/L
POC BE: -4 MMOL/L
POC BE: -5 MMOL/L
POC BE: -6 MMOL/L
POC BE: 0 MMOL/L
POC IONIZED CALCIUM: 1.07 MMOL/L (ref 1.06–1.42)
POC IONIZED CALCIUM: 1.12 MMOL/L (ref 1.06–1.42)
POC IONIZED CALCIUM: 1.14 MMOL/L (ref 1.06–1.42)
POC IONIZED CALCIUM: 1.21 MMOL/L (ref 1.06–1.42)
POC IONIZED CALCIUM: 1.31 MMOL/L (ref 1.06–1.42)
POC SATURATED O2: 94 % (ref 95–100)
POC SATURATED O2: 94 % (ref 95–100)
POC SATURATED O2: 97 % (ref 95–100)
POC SATURATED O2: 98 % (ref 95–100)
POC SATURATED O2: 98 % (ref 95–100)
POC TCO2: 22 MMOL/L (ref 23–27)
POC TCO2: 24 MMOL/L (ref 23–27)
POC TCO2: 24 MMOL/L (ref 23–27)
POC TCO2: 25 MMOL/L (ref 23–27)
POC TCO2: 28 MMOL/L (ref 23–27)
POTASSIUM BLD-SCNC: 3.8 MMOL/L (ref 3.5–5.1)
POTASSIUM BLD-SCNC: 3.9 MMOL/L (ref 3.5–5.1)
POTASSIUM BLD-SCNC: 3.9 MMOL/L (ref 3.5–5.1)
POTASSIUM BLD-SCNC: 4.1 MMOL/L (ref 3.5–5.1)
POTASSIUM BLD-SCNC: 4.1 MMOL/L (ref 3.5–5.1)
POTASSIUM SERPL-SCNC: 4.2 MMOL/L (ref 3.5–5.1)
RBC # BLD AUTO: 3.12 M/UL (ref 4.6–6.2)
SAMPLE: ABNORMAL
SODIUM BLD-SCNC: 132 MMOL/L (ref 136–145)
SODIUM BLD-SCNC: 133 MMOL/L (ref 136–145)
SODIUM BLD-SCNC: 134 MMOL/L (ref 136–145)
SODIUM SERPL-SCNC: 133 MMOL/L (ref 136–145)
WBC # BLD AUTO: 13.17 K/UL (ref 3.9–12.7)

## 2022-01-05 PROCEDURE — 99291 PR CRITICAL CARE, E/M 30-74 MINUTES: ICD-10-PCS | Mod: GC,,, | Performed by: STUDENT IN AN ORGANIZED HEALTH CARE EDUCATION/TRAINING PROGRAM

## 2022-01-05 PROCEDURE — 99900035 HC TECH TIME PER 15 MIN (STAT)

## 2022-01-05 PROCEDURE — 99900026 HC AIRWAY MAINTENANCE (STAT)

## 2022-01-05 PROCEDURE — 25000003 PHARM REV CODE 250: Performed by: STUDENT IN AN ORGANIZED HEALTH CARE EDUCATION/TRAINING PROGRAM

## 2022-01-05 PROCEDURE — 20000000 HC ICU ROOM

## 2022-01-05 PROCEDURE — 99291 CRITICAL CARE FIRST HOUR: CPT | Mod: GC,,, | Performed by: STUDENT IN AN ORGANIZED HEALTH CARE EDUCATION/TRAINING PROGRAM

## 2022-01-05 PROCEDURE — 83735 ASSAY OF MAGNESIUM: CPT | Performed by: STUDENT IN AN ORGANIZED HEALTH CARE EDUCATION/TRAINING PROGRAM

## 2022-01-05 PROCEDURE — 27000221 HC OXYGEN, UP TO 24 HOURS

## 2022-01-05 PROCEDURE — 97161 PT EVAL LOW COMPLEX 20 MIN: CPT

## 2022-01-05 PROCEDURE — 97110 THERAPEUTIC EXERCISES: CPT

## 2022-01-05 PROCEDURE — 63600175 PHARM REV CODE 636 W HCPCS: Performed by: STUDENT IN AN ORGANIZED HEALTH CARE EDUCATION/TRAINING PROGRAM

## 2022-01-05 PROCEDURE — 80048 BASIC METABOLIC PNL TOTAL CA: CPT | Performed by: STUDENT IN AN ORGANIZED HEALTH CARE EDUCATION/TRAINING PROGRAM

## 2022-01-05 PROCEDURE — 85025 COMPLETE CBC W/AUTO DIFF WBC: CPT | Performed by: STUDENT IN AN ORGANIZED HEALTH CARE EDUCATION/TRAINING PROGRAM

## 2022-01-05 PROCEDURE — 92597 ORAL SPEECH DEVICE EVAL: CPT

## 2022-01-05 PROCEDURE — 84100 ASSAY OF PHOSPHORUS: CPT | Performed by: STUDENT IN AN ORGANIZED HEALTH CARE EDUCATION/TRAINING PROGRAM

## 2022-01-05 PROCEDURE — 94761 N-INVAS EAR/PLS OXIMETRY MLT: CPT

## 2022-01-05 PROCEDURE — 97530 THERAPEUTIC ACTIVITIES: CPT

## 2022-01-05 PROCEDURE — 97165 OT EVAL LOW COMPLEX 30 MIN: CPT

## 2022-01-05 PROCEDURE — 63600175 PHARM REV CODE 636 W HCPCS

## 2022-01-05 PROCEDURE — 25000003 PHARM REV CODE 250

## 2022-01-05 PROCEDURE — 25000242 PHARM REV CODE 250 ALT 637 W/ HCPCS: Performed by: STUDENT IN AN ORGANIZED HEALTH CARE EDUCATION/TRAINING PROGRAM

## 2022-01-05 RX ORDER — SODIUM,POTASSIUM PHOSPHATES 280-250MG
2 POWDER IN PACKET (EA) ORAL
Status: DISCONTINUED | OUTPATIENT
Start: 2022-01-05 | End: 2022-01-07

## 2022-01-05 RX ORDER — FOLIC ACID 1 MG/1
1 TABLET ORAL DAILY
Status: DISCONTINUED | OUTPATIENT
Start: 2022-01-06 | End: 2022-01-25 | Stop reason: HOSPADM

## 2022-01-05 RX ORDER — HYDRALAZINE HYDROCHLORIDE 20 MG/ML
10 INJECTION INTRAMUSCULAR; INTRAVENOUS ONCE
Status: COMPLETED | OUTPATIENT
Start: 2022-01-05 | End: 2022-01-05

## 2022-01-05 RX ORDER — THIAMINE HCL 100 MG
100 TABLET ORAL DAILY
Status: DISCONTINUED | OUTPATIENT
Start: 2022-01-06 | End: 2022-01-25 | Stop reason: HOSPADM

## 2022-01-05 RX ORDER — LANOLIN ALCOHOL/MO/W.PET/CERES
800 CREAM (GRAM) TOPICAL
Status: DISCONTINUED | OUTPATIENT
Start: 2022-01-05 | End: 2022-01-07

## 2022-01-05 RX ORDER — MAGNESIUM SULFATE 1 G/100ML
1 INJECTION INTRAVENOUS ONCE
Status: COMPLETED | OUTPATIENT
Start: 2022-01-05 | End: 2022-01-05

## 2022-01-05 RX ADMIN — ACETAMINOPHEN 650 MG: 325 TABLET ORAL at 01:01

## 2022-01-05 RX ADMIN — THIAMINE HYDROCHLORIDE 100 MG: 100 INJECTION, SOLUTION INTRAMUSCULAR; INTRAVENOUS at 10:01

## 2022-01-05 RX ADMIN — ACETAMINOPHEN 650 MG: 325 TABLET ORAL at 11:01

## 2022-01-05 RX ADMIN — BACITRACIN 1 EACH: 500 OINTMENT TOPICAL at 09:01

## 2022-01-05 RX ADMIN — AMPICILLIN SODIUM AND SULBACTAM SODIUM 3 G: 2; 1 INJECTION, POWDER, FOR SOLUTION INTRAMUSCULAR; INTRAVENOUS at 05:01

## 2022-01-05 RX ADMIN — ACETAMINOPHEN 650 MG: 325 TABLET ORAL at 05:01

## 2022-01-05 RX ADMIN — AMPICILLIN SODIUM AND SULBACTAM SODIUM 3 G: 2; 1 INJECTION, POWDER, FOR SOLUTION INTRAMUSCULAR; INTRAVENOUS at 10:01

## 2022-01-05 RX ADMIN — ATORVASTATIN CALCIUM 20 MG: 20 TABLET, FILM COATED ORAL at 09:01

## 2022-01-05 RX ADMIN — FOLIC ACID 1 MG: 5 INJECTION, SOLUTION INTRAMUSCULAR; INTRAVENOUS; SUBCUTANEOUS at 09:01

## 2022-01-05 RX ADMIN — AMPICILLIN SODIUM AND SULBACTAM SODIUM 3 G: 2; 1 INJECTION, POWDER, FOR SOLUTION INTRAMUSCULAR; INTRAVENOUS at 01:01

## 2022-01-05 RX ADMIN — BACITRACIN 1 EACH: 500 OINTMENT TOPICAL at 08:01

## 2022-01-05 RX ADMIN — SODIUM CHLORIDE: 0.9 INJECTION, SOLUTION INTRAVENOUS at 10:01

## 2022-01-05 RX ADMIN — MAGNESIUM SULFATE HEPTAHYDRATE 1 G: 500 INJECTION, SOLUTION INTRAMUSCULAR; INTRAVENOUS at 06:01

## 2022-01-05 RX ADMIN — METOPROLOL TARTRATE 100 MG: 50 TABLET, FILM COATED ORAL at 09:01

## 2022-01-05 RX ADMIN — ONDANSETRON 4 MG: 2 INJECTION INTRAMUSCULAR; INTRAVENOUS at 09:01

## 2022-01-05 RX ADMIN — ASPIRIN 81 MG CHEWABLE TABLET 81 MG: 81 TABLET CHEWABLE at 09:01

## 2022-01-05 RX ADMIN — DOCUSATE SODIUM 100 MG: 50 LIQUID ORAL at 09:01

## 2022-01-05 RX ADMIN — HYDRALAZINE HYDROCHLORIDE 10 MG: 20 INJECTION, SOLUTION INTRAMUSCULAR; INTRAVENOUS at 03:01

## 2022-01-05 RX ADMIN — HYDROMORPHONE HYDROCHLORIDE 0.5 MG: 1 INJECTION, SOLUTION INTRAMUSCULAR; INTRAVENOUS; SUBCUTANEOUS at 05:01

## 2022-01-05 RX ADMIN — DOCUSATE SODIUM 100 MG: 50 LIQUID ORAL at 08:01

## 2022-01-05 RX ADMIN — AMLODIPINE BESYLATE 10 MG: 10 TABLET ORAL at 09:01

## 2022-01-05 RX ADMIN — METOPROLOL TARTRATE 100 MG: 50 TABLET, FILM COATED ORAL at 08:01

## 2022-01-05 RX ADMIN — AMPICILLIN SODIUM AND SULBACTAM SODIUM 3 G: 2; 1 INJECTION, POWDER, FOR SOLUTION INTRAMUSCULAR; INTRAVENOUS at 06:01

## 2022-01-05 RX ADMIN — OXYCODONE HYDROCHLORIDE 10 MG: 5 SOLUTION ORAL at 09:01

## 2022-01-05 RX ADMIN — ACETAMINOPHEN 650 MG: 325 TABLET ORAL at 06:01

## 2022-01-05 RX ADMIN — ENOXAPARIN SODIUM 40 MG: 40 INJECTION SUBCUTANEOUS at 06:01

## 2022-01-05 NOTE — PLAN OF CARE
Recommendations     1. Rec Impact Peptide 1.5 advancing as tolerated until goal of 50 mL/hr providing pt with 1800 kcal, 113 g protein, and 924 mL free water               -Bolus: Impact Peptide 1.5 5 cans/day - 1875 kcal, 118 g protein, and 965 mL free water      2. Monitor and follow-up     Goals: Pt to tolerate TF @ goal  Nutrition Goal Status: new  Communication of RD Recs:  (POC)

## 2022-01-05 NOTE — PROGRESS NOTES
General Surgery Brief Progress Note    Patient seen and examined at bedside. No major issues overnight.     -- Ok to begin using PEG for tube feedings today.   -- Please flush tube to maintain patency after every feeding or med admin.       Call with questions, will sign off    Nikita Glass MD   Pager: [phone]  General Surgery PGY-V  [facility]

## 2022-01-05 NOTE — PT/OT/SLP EVAL
Speech Language Pathology Evaluation  One Way Speaking Valve Evaluation    Patient Name:  Fareed Richard Jr.   MRN:  [id number]  Admitting Diagnosis: Squamous cell cancer of tongue    IMPORTANT  CAUTION: CUFF MUST ALWAYS BE DEFLATED WHEN VALVE IN USE    Recommendations:                 General Recommendations:  One Way Speaking Valve  Diet recommendations:  NPO, NPO   Aspiration Precautions: Strict aspiration precautions   General Precautions: Standard, NPO,fall  Communication strategies:  gestures    History:     Past Medical History:   Diagnosis Date    Cancer     skin to Rt forearm and face    COPD (chronic obstructive pulmonary disease)     Hyperlipidemia     Hypertension     Pseudoaneurysm        Past Surgical History:   Procedure Laterality Date    ABDOMINAL SURGERY      stents placed in liver and large intestines, per patient    CAROTID STENT      CORONARY STENT PLACEMENT  01/2000    DISSECTION OF NECK Bilateral 1/4/2022    Procedure: DISSECTION, NECK;  Surgeon: Naeem Smalls MD;  Location: [facility] OR [address] [place] [address];  Service: ENT;  Laterality: Bilateral;    ESOPHAGOGASTRODUODENOSCOPY W/ PEG N/A 1/4/2022    Procedure: EGD, WITH PEG TUBE INSERTION;  Surgeon: Anthony Calabrese MD;  Location: [facility] OR [address] [place] [address];  Service: General;  Laterality: N/A;    EYE SURGERY      Cataract bilateral    femoral stents      bilateral    FLAP PROCEDURE N/A 1/3/2022    Procedure: CREATION, FREE FLAP;  Surgeon: Naeem Smalls MD;  Location: [facility] OR [address] [place] [address];  Service: ENT;  Laterality: N/A;    FLAP PROCEDURE Right 1/4/2022    Procedure: CREATION, FREE FLAP;  Surgeon: Stacey Conde MD;  Location: [facility] OR [address] [place] [address];  Service: ENT;  Laterality: Right;  Ischemic start 1203  Ischemic stop 1353    GLOSSECTOMY N/A 1/4/2022    Procedure: GLOSSECTOMY;  Surgeon: Naeem Smalls MD;  Location: [facility] OR [address] [place] [address];  Service: ENT;  Laterality: N/A;    RADICAL NECK DISSECTION Left 1/3/2022    Procedure: DISSECTION, NECK, RADICAL;   Surgeon: Naeem Smalls MD;  Location: [facility] OR [address] [place] [address];  Service: ENT;  Laterality: Left;    SKIN CANCER EXCISION      TRACHEOTOMY N/A 1/4/2022    Procedure: TRACHEOTOMY;  Surgeon: Naeem Smalls MD;  Location: [facility] OR [address] [place] [address];  Service: ENT;  Laterality: N/A;       Social History: Patient lives with spouse who was at bedside throughout assessment.    Prior Intubation HX:  1/4 for therapy    Subjective     Awake/alert  Spouse at bedside  Objective:     Level of Alertness:  · Alert    Secretion Management:  · Pt unable to maintain oral secretions at this time    Pain/Comfort:  · Pain Rating 1: 0/10  · Pain Rating Post-Intervention 1: 0/10    Respiratory Status: trach collar    Oral Musculature Evaluation  · Oral Musculature:  (unable to assess)    Passy Blenheim Speaking Valve  Trach size/type: Shiley 6 cuff deflated  Able to phonate around trach: no  Vocal quality with digital finger occlusion:unable to achieve  O2 sats at rest:98%  PMSV not placed 2/2 not appropriate post surgery day 1. SLP will follow up for appropriateness  Education topics addressed: cleaning valve, one-way technology, anatomy of trach, precautions with cuffed trach, removal of valve prior to sleeping    Assessment:     Fareed Richard Jr. is a 71 y.o. male with an SLP diagnosis of One Way Speaking Valve Training.   Goals:   Multidisciplinary Problems     SLP Goals        Problem: SLP Goal    Goal Priority Disciplines Outcome   SLP Goal     SLP Ongoing, Progressing   Description: Speech Language Pathology Goals  Goals expected to be met by 1/12    1. Pt will tolerate PMSV for 5 minutes with >92% spO2 to increase phonation, respiration and swallowing aspects  2. Pt/family will don/doff PMSV x1 during session with min cues  3. Pt will vocalize with PMSV in place to increase verbal expression.                          Plan:     · Patient to be seen:  3 x/week   · Plan of Care reviewed with:  patient   · SLP Follow-Up:  Yes       Discharge  recommendations:    TBD      Time Tracking:     SLP Treatment Date:   01/05/22  Speech Start Time:  0900  Speech Stop Time:  0909     Speech Total Time (min):  9 min    Billable Minutes: Evaluation Use/Fit Voiced Prosthetic 9    01/05/2022

## 2022-01-05 NOTE — SUBJECTIVE & OBJECTIVE
Interval History: NAEON. Hypertensive requiring 1 dose hydralazine. Flap with + triphasic artery signal throughout night. R DP remains with pulses, no signs of hematoma at R thigh incision. Pain well controlled.     Medications:  Continuous Infusions:   sodium chloride 0.9% 125 mL/hr at 01/05/22 0600    sodium chloride 0.9% Stopped (01/05/22 0509)     Scheduled Meds:   acetaminophen  650 mg Per G Tube Q6H    amLODIPine  10 mg Per G Tube Daily    ampicillin-sulbactim (UNASYN) IVPB  3 g Intravenous Q6H    aspirin  81 mg Per G Tube Daily    atorvastatin  20 mg Per G Tube Daily    bacitracin zinc   Topical (Top) BID    docusate  100 mg Per G Tube BID    enoxaparin  40 mg Subcutaneous Daily    fluticasone furoate  1 puff Inhalation Daily    folic acid (FOLVITE) IVPB 1 mg  1 mg Intravenous Daily    LIDOcaine (PF) 10 mg/ml (1%)  1 mL Other Once    magnesium sulfate IVPB  1 g Intravenous Once    metoprolol tartrate  100 mg Per G Tube BID    thiamine (VITAMIN B1) IVPB  100 mg Intravenous Daily    tiotropium bromide  2 puff Inhalation Daily     PRN Meds:albuterol-ipratropium, HYDROmorphone, lorazepam, ondansetron, oxyCODONE, oxyCODONE, sodium chloride 0.9%     Review of patient's allergies indicates:  No Known Allergies  Objective:     Vital Signs (24h Range):  Temp:  [97 °F (36.1 °C)-99.5 °F (37.5 °C)] 98 °F (36.7 °C)  Pulse:  [78-97] 81  Resp:  [10-24] 14  SpO2:  [90 %-100 %] 97 %  BP: ()/(48-65) 104/62  Arterial Line BP: ()/(38-62) 148/51       Lines/Drains/Airways     Drain                 Gastrostomy/Enterostomy 01/04/22 Percutaneous endoscopic gastrostomy (PEG) LUQ 1 day         Closed/Suction Drain 01/04/22 1244 Right;Anterior Thigh Bulb 15 Fr. <1 day         Closed/Suction Drain 01/04/22 1246 Right;Anterior Thigh Bulb 15 Fr. <1 day         Closed/Suction Drain 01/04/22 1450 Left Neck Bulb 15 Fr. <1 day         Closed/Suction Drain 01/04/22 1450 Right Neck Bulb 15 Fr. <1 day          Closed/Suction Drain 01/04/22 1450 Right Neck Bulb 15 Fr. <1 day         Urethral Catheter 01/04/22 0732 Non-latex 16 Fr. <1 day          Airway                 Airway - Non-Surgical 01/04/22 0723 <1 day         Surgical Airway 01/04/22 1546 Shiley Cuffed <1 day          Arterial Line            Arterial Line 01/04/22 0740 Left Radial <1 day          Peripheral Intravenous Line                 Peripheral IV - Single Lumen 01/03/22 1307 20 G Anterior;Proximal;Right Forearm 1 day         Peripheral IV - Single Lumen 01/04/22 0516 18 G Right Antecubital 1 day                Physical Exam  HENT:      Head:      Comments: Cervical facial flap soft, appropriate coloring, + triphasic artery  Pulses, R side slightly more edematous  Fresh trach CDI     Nose: No congestion.      Mouth/Throat:      Mouth: Mucous membranes are moist.   Eyes:      Extraocular Movements: Extraocular movements intact.      Pupils: Pupils are equal, round, and reactive to light.   Cardiovascular:      Rate and Rhythm: Normal rate and regular rhythm.      Pulses: Normal pulses.      Heart sounds: Normal heart sounds. No murmur heard.      Pulmonary:      Effort: No respiratory distress.      Breath sounds: Normal breath sounds. No wheezing.      Comments: Trach collar  Abdominal:      General: Abdomen is flat. There is no distension.      Palpations: Abdomen is soft.      Tenderness: There is no abdominal tenderness.      Comments: G tube in place   Musculoskeletal:         General: No swelling or tenderness.      Cervical back: Normal range of motion and neck supple. No rigidity.      Comments: Donor Site: R thigh incision CDI, no signs of hematoma. R DP pulses intact. Drains with minimal SS output   Skin:     General: Skin is warm and dry.   Neurological:      General: No focal deficit present.      Mental Status: He is alert and oriented to person, place, and time.          Significant Labs:  CBC:   Recent Labs   Lab 01/05/22  0311   WBC 13.17*    RBC 3.12*   HGB 9.2*   HCT 29.0*      MCV 93   MCH 29.5   MCHC 31.7*     CMP:   Recent Labs   Lab 01/04/22  1636 01/04/22  1636 01/05/22  0311   *   < > 147*   CALCIUM 7.9*   < > 7.6*   ALBUMIN 2.7*  --   --    PROT 4.5*  --   --    *   < > 133*   K 4.5   < > 4.2   CO2 18*   < > 20*      < > 103   BUN 6*   < > 8   CREATININE 0.6   < > 0.5   ALKPHOS 43*  --   --    ALT 9*  --   --    AST 17  --   --    BILITOT 1.6*  --   --     < > = values in this interval not displayed.     Coagulation:   Recent Labs   Lab 01/04/22  0405   LABPROT 10.3   INR 0.9   APTT 29.3       Significant Diagnostics:  I have reviewed all pertinent imaging results/findings within the past 24 hours.

## 2022-01-05 NOTE — PT/OT/SLP EVAL
Occupational Therapy  Co- Evaluation    Name: Fareed Richard Jr.  MRN: [id number]  Admitting Diagnosis:  Squamous cell cancer of tongue  Recent Surgery: Procedure(s) (LRB):  GLOSSECTOMY (N/A)  TRACHEOTOMY (N/A)  EGD, WITH PEG TUBE INSERTION (N/A)  CREATION, FREE FLAP (Right)  DISSECTION, NECK (Bilateral) 1 Day Post-Op    Recommendations:     Discharge Recommendations: home with home health       Assessment:     Fareed Richard Jr. is a 71 y.o. male with a medical diagnosis of Squamous cell cancer of tongue.Performance deficits affecting function: weakness,impaired endurance,impaired self care skills,impaired functional mobilty,gait instability,impaired balance,decreased upper extremity function,decreased lower extremity function.  Pt tolerated session well with good effort and performance. Anticipate pt will progress well and will be ready for d/c home with family support when medically stable     Rehab Prognosis: Good; patient would benefit from acute skilled OT services to address these deficits and reach maximum level of function.       Plan:     Patient to be seen 4 x/week to address the above listed problems via self-care/home management,therapeutic activities,therapeutic exercises  · Plan of Care Expires:    · Plan of Care Reviewed with: patient    Subjective     Pt denies pain.   Pt agreeable to therapy     Occupational Profile:  Spouse present in room and able to provide PLOF. Pt resides with spouse in one story home with 3 BECKY and R HR. Pt was has shower chair, RW, SW, QC but was not using any DME prior to arrival. Pt drives and is active around the home. Spouse works but plans to take time off to assist pt as needed.     Pain/Comfort:  · Pain Rating 1: 0/10    Patients cultural, spiritual, Temple conflicts given the current situation: no    Objective:     Communicated with: nsg prior to session. Pt found supine in bed with tele, pulse ox, BP cuff, 5 RACHEL drains, trach.   Spouse present at start and end of  session.   Co-eval completed this date to optimize functional performance and safety given impaired tolerance for activity in setting of ICU.     General Precautions: Standard, fall,NPO   No neck ties.     Occupational Performance:    Bed Mobility:    Supine>sit with TOTAL A     Functional Mobility/Transfers:  · Sit.stand with CGA and stand pivot with CGA     Activities of Daily Living:  · Feeding: NPO  · G/H MOD A wash face; MIN A wash hands; TOTAL A comb hair  · LE dressing: TOTAL A   · UE dressing: TOTAL A     Cognitive/Visual Perceptual:  Pt awake, alert and following commands.   Physical Exam:  Pt with impaired B UE shoulder ROM due to pain related to incisions.   Pt with 3/5 elbow strength and Fair .     AMPAC 6 Click ADL:  AMPAC Total Score: 7    Treatment & Education:  Nsg provided deep suctioning via trach prior to mobility.   Pt tolerated sitting EOB approx 7 min with SBA. Pt able to stand and t/f to chair with CGA.   VSS monitored and remained stable during session. ADL performance limited by impaired shoulder ROM/strength related to several incisions in neck. Proximal support provided to allow for distal mobility with basic g/h skills. Pt demo adequate postural control.   Education provided re: OT POC and safety with functional mobility/ADl skills.   Education:    Patient left up in chair with all lines intact, call button in reach and nsg notified    GOALS:   Multidisciplinary Problems     Occupational Therapy Goals        Problem: Occupational Therapy Goal    Goal Priority Disciplines Outcome Interventions   Occupational Therapy Goal     OT, PT/OT Ongoing, Progressing    Description: Goals to be met by: 7 days 1/12/22     Patient will increase functional independence with ADLs by performing:    Pt to complete standing g/h skills with SBA  Pt to complete UE dressing with DARRYL    Pt to complete LE dressing with MIN A   Pt to complete toileting with SBA  Pt to complete t/f to commode, bed and chair  with SBA                     History:     Past Medical History:   Diagnosis Date    Cancer     skin to Rt forearm and face    COPD (chronic obstructive pulmonary disease)     Hyperlipidemia     Hypertension     Pseudoaneurysm        Past Surgical History:   Procedure Laterality Date    ABDOMINAL SURGERY      stents placed in liver and large intestines, per patient    CAROTID STENT      CORONARY STENT PLACEMENT  01/2000    DISSECTION OF NECK Bilateral 1/4/2022    Procedure: DISSECTION, NECK;  Surgeon: Naeem Smalls MD;  Location: [facility] OR [address] [place] [address];  Service: ENT;  Laterality: Bilateral;    ESOPHAGOGASTRODUODENOSCOPY W/ PEG N/A 1/4/2022    Procedure: EGD, WITH PEG TUBE INSERTION;  Surgeon: Anthony Calabrese MD;  Location: [facility] OR [address] [place] [address];  Service: General;  Laterality: N/A;    EYE SURGERY      Cataract bilateral    femoral stents      bilateral    FLAP PROCEDURE N/A 1/3/2022    Procedure: CREATION, FREE FLAP;  Surgeon: Naeem Smalls MD;  Location: [facility] OR [address] [place] [address];  Service: ENT;  Laterality: N/A;    FLAP PROCEDURE Right 1/4/2022    Procedure: CREATION, FREE FLAP;  Surgeon: Stacey Conde MD;  Location: [facility] OR [address] [place] [address];  Service: ENT;  Laterality: Right;  Ischemic start 1203  Ischemic stop 1353    GLOSSECTOMY N/A 1/4/2022    Procedure: GLOSSECTOMY;  Surgeon: Naeem Smalls MD;  Location: [facility] OR [address] [place] [address];  Service: ENT;  Laterality: N/A;    RADICAL NECK DISSECTION Left 1/3/2022    Procedure: DISSECTION, NECK, RADICAL;  Surgeon: Naeem Smalls MD;  Location: [facility] OR [address] [place] [address];  Service: ENT;  Laterality: Left;    SKIN CANCER EXCISION      TRACHEOTOMY N/A 1/4/2022    Procedure: TRACHEOTOMY;  Surgeon: Naeem Smalls MD;  Location: [facility] OR [address] [place] [address];  Service: ENT;  Laterality: N/A;       Time Tracking:     OT Date of Treatment: 01/05/22  OT Start Time: 0924  OT Stop Time: 0947  OT Total Time (min): 23 min    Billable Minutes:Evaluation 15  Self Care/Home Management  8    1/5/2022

## 2022-01-05 NOTE — PLAN OF CARE
ENT 6 Hour Post-Anastamosis Flap Check     Post-op events: No acute events, resting in bed comfortably on trach collar     Flap Site  Color: appropriate   Cap refill: well perfused  Turgor: soft  Temperature: warm  Doppler:  + triphasic artery signal  Implantable doppler: Venous laminar flow strong, signal intermittent - normal     Donor Site  R DP with palpable pulse. No concern for hematoma over the R thigh.     No appreciable fluid collection in neck, cervico-facial advancement flap intact and appears normal    Continue Q1hr flap checks.    Ramesh Guzman MD  Otolaryngology - Head and Neck Surgery  01/04/2022

## 2022-01-05 NOTE — ASSESSMENT & PLAN NOTE
"71M with [id number] SCC of the oral tongue s/p total glossectomy, bilateral neck dissections levels I-IV, tracheostomy, and ALTFF reconstruction with G tube placement by general surgery. Doing well.     ENT   -Continue q1h flap checks: Record Doppler Pulses (artery and vein) ,Capillary Refill , Color, Turgor, temperature.   -Neurovascular Checks q1h of bilateral upper and lower extremities   -Keep head elevated and in neutral position   -Sign above bed that reads "No circumferential Neck Ties"   -Sign above bed that reads "No ties around tracheostomy"   -NO VASOPRESSORS unless cleared by ENT Head and Neck Surgery Attending   -FOR ANY FLAP ISSUES, PLEASE PHONE ENT RESIDENT ON CALL.  -Please label drains carefully and document accordingly  -Bacitracin to all incisions/staple lines  -SLP consult for new tracheostomy and total glossectomy     Neuro  -pain control    -scheduled tylenol    -oxycodone/dilaudid PRN   -CIWA protocol as precaution    -thiamine and folate     CV  -discontinue meghann   -home medications restarted (amlodipine, metoprolol)    -metoprolol changed from succinate to tartrate. Have some room to titrate up if needed for BP control   -telemetry   -home atorvastatin  -on ASA. Patient reported not taking blood thinner pre-op. Will clarify if he was taking Plavix. Hold Plavix for now.       Resp  -fresh trach care   -trach collar   -will likely change to cuffless trach on Sunday vs Monday   -trach teaching with respiratory   -continuous pulse ox   -inhalers (fluticasone and tiotropium daily, duonebs PRN)    FENGI  -strict NPO, mIVFs  -trickle TFs today. Plan to advance slowly tomorrow   -Nutrition consulted for TF recommendations. Prefer Impact Peptide 1.5   -zofran PRN   -colace for bowel regimen     Renal  -discontinue flores     Heme/ID   -Unasyn x 72 hours post-op     Endocrine   -no issues, monitor glucose     MSK   -PT/OT  -OOB     Ppx: L40, SCDs, NIKITA hose     Dispo: continue ICU care for q1h flap " checks    Discussed ENT plan with SICU resident

## 2022-01-05 NOTE — CONSULTS
[facility] Intensive [facility]  Adult Nutrition  Consult Note    SUMMARY     Recommendations    1. Rec Impact Peptide 1.5 advancing as tolerated until goal of 50 mL/hr providing pt with 1800 kcal, 113 g protein, and 924 mL free water    -Bolus: Impact Peptide 1.5 5 cans/day - 1875 kcal, 118 g protein, and 965 mL free water     2. Monitor and follow-up    Goals: Pt to tolerate TF @ goal  Nutrition Goal Status: new  Communication of RD Recs:  (POC)    Assessment and Plan    Nutrition Problem:  Severe Protein-Calorie Malnutrition  Malnutrition in the context of Chronic Illness/Injury    Related to (etiology):  Inability to consume sufficient energy    Signs and Symptoms (as evidenced by):  Energy Intake: </= 75% of estimated energy requirement for >/= 1 month  Body Fat Depletion: LACY   Muscle Mass Depletion: LACY  Weight Loss: 14% x 1 month  Fluid Accumulation: mild    Interventions(treatment strategy):  Collaboration with other providers  Enteral nutrition    Nutrition Diagnosis Status:  New       Malnutrition Assessment  Malnutrition Type: chronic illness          Weight Loss (Malnutrition): greater than 5% in 1 month  Energy Intake (Malnutrition): less than or equal to 75% for greater than or equal to 1 month           Edema (Fluid Accumulation): 2-->mild             Reason for Assessment    Reason For Assessment: consult  Diagnosis: cancer diagnosis/related complications  Relevant Medical History: squamous cell carcinoma, HTN, CAD s/p stents, HLD, COPD    General Information Comments: S/p glossectomy/neck dissection/free flap/ trach/PEG on 1/4. TFs to be initiated. Noted with decreased PO intake PTA due to pain from cancer. 14% wt loss x 1 month. NFPE unable to be completed today, will complete at f/u. With current information pt meets severe malnutrition in the context of chronic illness per ASPEN guidelines.    Nutrition Discharge Planning: Adequate nutrition via TF through PEG    Nutrition Risk  "Screen    Nutrition Risk Screen: dysphagia or difficulty swallowing    Nutrition/Diet History    Patient Reported Diet/Restrictions/Preferences: general  Spiritual, Cultural Beliefs, Adventism Practices, Values that Affect Care: no  Food Allergies: NKFA  Factors Affecting Nutritional Intake: difficulty/impaired swallowing,NPO    Anthropometrics    Temp: 98.2 °F (36.8 °C)  Height: 5' 8" (172.7 cm)  Height (inches): 68 in  Weight Method: Bed Scale  Weight: 66.9 kg (147 lb 7.8 oz)  Weight (lb): 147.49 lb  Ideal Body Weight (IBW), Male: 154 lb  % Ideal Body Weight, Male (lb): 82.6 %  BMI (Calculated): 22.4  BMI Grade: 18.5-24.9 - normal  Weight Loss: unintentional       Lab/Procedures/Meds    Pertinent Labs Reviewed: reviewed  Pertinent Labs Comments: Na 133, glu 147  Pertinent Medications Reviewed: reviewed  Pertinent Medications Comments: amlodipine, statin, docusate, enoxaparin, folic acid, mg, metoprolol, thiamine, NaCl    Estimated/Assessed Needs    Weight Used For Calorie Calculations: 66.9 kg (147 lb 7.8 oz)  Energy Calorie Requirements (kcal): 6819-3088 kcal/day  Energy Need Method: Kcal/kg (25-30)  Protein Requirements:  g/day (1.2-1.5 g/day)  Weight Used For Protein Calculations: 66.9 kg (147 lb 7.8 oz)  Fluid Requirements (mL): 1 mL/kcal or per MD  Estimated Fluid Requirement Method: RDA Method  RDA Method (mL): 1673         Nutrition Prescription Ordered    Current Diet Order: NPO  Current Nutrition Support Formula Ordered: Impact Peptide 1.5    Evaluation of Received Nutrient/Fluid Intake    I/O: +3.9L since admit  Energy Calories Required: not meeting needs  Protein Required: not meeting needs  Fluid Required: not meeting needs  Comments: LBM: 1/3  Tolerance: tolerating  % Intake of Estimated Energy Needs: 0 - 25 %  % Meal Intake: NPO    Nutrition Risk    Level of Risk/Frequency of Follow-up: low       Monitor and Evaluation    Food and Nutrient Intake: energy intake,enteral nutrition intake  Food " and Nutrient Adminstration: enteral and parenteral nutrition administration  Anthropometric Measurements: weight,weight change  Biochemical Data, Medical Tests and Procedures: electrolyte and renal panel,gastrointestinal profile,glucose/endocrine profile,inflammatory profile,lipid profile  Nutrition-Focused Physical Findings: overall appearance,extremities, muscles and bones,skin       Nutrition Follow-Up    RD Follow-up?: Yes

## 2022-01-05 NOTE — PT/OT/SLP EVAL
Physical Therapy Co-Evaluation and Co-Treatment    Patient Name:  Fareed Richard Jr.   MRN:  [id number]  Admit Date: 1/3/2022  Admitting Diagnosis:  Squamous cell cancer of tongue   Length of Stay: 2 days  Recent Surgery: Procedure(s) (LRB):  GLOSSECTOMY (N/A)  TRACHEOTOMY (N/A)  EGD, WITH PEG TUBE INSERTION (N/A)  CREATION, FREE FLAP (Right)  DISSECTION, NECK (Bilateral) 1 Day Post-Op    Recommendations:     Discharge Recommendations:  home with home health   Discharge Equipment Recommendations: none   Barriers to discharge: post-op, pain, impaired endurance, decreased activity tolerance, inaccessible home    Assessment:     Fareed Richard Jr. is a 71 y.o. male admitted with a medical diagnosis of Squamous cell cancer of tongue.  He presents with the following impairments/functional limitations: weakness,impaired endurance,impaired self care skills,impaired functional mobilty,gait instability,impaired balance,pain,decreased upper extremity function,decreased lower extremity function,impaired skin. Pt presents s/p glossectomy, neck dissection, free flap, and trach/PEG on 1/4/2022. Pt with good functional strength upon evaluation and able to perform all functional tasks and transfers with minimal to no assistance. At this time pt is primarily limited by post-op pain as well as impaired activity tolerance s/p major surgery. Pt will continue to benefit from skilled PT services during this hospital admit to continue to improve transfer ability and efficiency as well as continue to progress pt's ambulation distance and cardiopulmonary endurance to maximize pt's functional independence and return to PLOF. After discharge pt will benefit from HHPT to further progress functional mobility and cardiopulmonary endurance as well as for home safety and energy conservation techniques.    Rehab Prognosis: Good; patient would benefit from acute skilled PT services to address these deficits and reach maximum level of function.       Plan:     During this hospitalization, patient to be seen 4 x/week to address the identified rehab impairments via gait training,therapeutic activities,therapeutic exercises,neuromuscular re-education and progress towards the established goals.    · Plan of Care Expires:  02/04/22    Subjective     RN notified prior to session. Wife, Bridgett, present upon PT entrance into room.    Chief Complaint: Pt endorsing pain at graft site  Patient/Family Comments/goals: get better  Pain/Comfort:  · Pain Rating 1: other (see comments) (did not rate but endorsing pain)  · Location - Side 1: Right  · Location - Orientation 1: upper  · Location 1: leg (graft site)  · Pain Addressed 1: Distraction,Nurse notified,Reposition  · Pain Rating Post-Intervention 1: other (see comments) (no change with mobility)    Social History: obtained from spouse 2/2 pt without PMSV in place; Pt okay'ed communication with spouse for info  Residence: lives with their spouse 1-story house with 3 BECKY and Rt HR.  Support available: family  Equipment Owned: walker, standard,walker, rolling,wheelchair,shower chair,cane, quad,cane, straight  Equipment Used: None  Prior level of function: independent  Hand Dominance: right.   Work: retired.   Drive: yes.   Managing Medicines/Managing Home: yes.   Hobbies: n/a    Patient reports they will have assistance from spouse upon discharge.    Objective:     Additional staff present: OT for co-evaluation due to patient's medical complexities requiring two skilled therapists in order to appropriately assess patient's functional deficits as well as ensure patient safety, accommodate for limited activity tolerance, and provide appropriate, skilled assistance to maximize functional potential during evaluation.    Patient found HOB elevated with: telemetry,blood pressure cuff,pulse ox (continuous),Tracheostomy,oxygen,peripheral IV,arterial line,PEG Tube,flores catheter,RACHEL drain (continuous doppler)     General Precautions:  "Standard, Cardiac fall,NPO, no neck ties   Orthopedic Precautions:N/A   Braces: N/A   Body mass index is 22.43 kg/m².  Oxygen Device: Trach Collar 40% & 10L  Vitals: BP (!) 145/64   Pulse 69   Temp 98.2 °F (36.8 °C) (Oral)   Resp 11   Ht 5' 8" (1.727 m)   Wt 66.9 kg (147 lb 7.8 oz)   SpO2 96%   BMI 22.43 kg/m²     Exams:  · Cognition:   · Alert and Cooperative  · AxOx4  · Command following: Follows multistep verbal commands  · Fluency: clear/fluent  · Hearing: Intact  · Vision:  Intact  · Skin Integrity: Visible skin intact and Intact incision present s/p above surgery  · Postural Assessment: slouched posture and rounded shoulders  · Physical Exam:    Left UE Left LE Right UE Right LE   Edema absent absent absent absent   ROM AROM WFL AROM WFL AROM WFL AROM WFL   Modified Chinmay Scale 0: No increase in muscle tone 0: No increase in muscle tone 0: No increase in muscle tone 0: No increase in muscle tone   Strength within normal limits within normal limits within normal limits within normal limits   Sensation intact to light touch intact to light touch intact to light touch intact to light touch   Coordination normal normal normal normal     Outcome Measures:  AM-PAC 6 CLICK MOBILITY  Turning over in bed (including adjusting bedclothes, sheets and blankets)?: 3  Sitting down on and standing up from a chair with arms (e.g., wheelchair, bedside commode, etc.): 3  Moving from lying on back to sitting on the side of the bed?: 3  Moving to and from a bed to a chair (including a wheelchair)?: 3  Need to walk in hospital room?: 3  Climbing 3-5 steps with a railing?: 2  Basic Mobility Total Score: 17     Functional Mobility:    Bed Mobility:   · Supine to Sit: minimum assistance; from Lt side of bed  · Scooting anteriorly to EOB to have both feet planted on floor: contact guard assistance    Sitting Balance at Edge of Bed:   Static Sitting Balance: Good- : able to accept minimal resistance   Dynamic Sitting " Balance: Fair : minimal weight shifting ipsilaterally or anteriorly. Difficulty crossing midline   Assistance Level Required: Stand-by Assistance    Transfers:   · Sit <> Stand Transfer: contact guard assistance with no assistive device   · Stand <> Sit Transfer: contact guard assistance with no assistive device   · [id number] from EOB  · Bed <> Chair Transfer: Stand Pivot technique with contact guard assistance with no assistive device  · Chair on patient's Rt    Standing Balance:   Static Standing Balance: Fair : able to stand unsupported without UE support and without LOB for 1 minute   Dynamic Standing Balance: Fair : stand independently unsupported, weight shift, and reach ipsilaterally. LOB noted when crossing midline.   Assistance Level Required: Contact Guard Assistance   Patient used: no assistive device    Time: ~45 seconds      Gait:   · Patient ambulated: ~4 steps to bedside chair   · Patient required: contact guard  · Patient used:  no assistive device   · Gait Pattern observed: reciprocal gait  · Gait Deviation(s): decreased step length, wide base of support, decreased weight shift, steady gait, shuffle gait and decreased tammi  · Impairments due to: pain and decreased endurance  · all lines remained intact throughout ambulation trial    Education:   Time provided for education, counseling and discussion of health disposition in regards to patient's current status   All questions answered within PT scope of practice and to patient's satisfaction   PT role in POC to address current functional deficits   Pt educated on proper body mechanics, safety techniques, and energy conservation with PT facilitation and cueing throughout session   Call nursing/pct to transfer to chair/use bathroom. Pt stated understanding.   Whiteboard updated with therapist name and pt's current mobility status documented above   Safe to perform stand pivot transfer to/from chair/bedside commode CGA and no AD w/  nursing/PCT present   Importance of OOB tolerance prn hrs/day to improve lung ventilation and expansion as well as strengthen postural musculature    Patient left up in chair with all lines intact, call button in reach, RN notified and wife present.    GOALS:   Multidisciplinary Problems     Physical Therapy Goals        Problem: Physical Therapy Goal    Goal Priority Disciplines Outcome Goal Variances Interventions   Physical Therapy Goal     PT, PT/OT Ongoing, Progressing     Description: Goals to be met by: 2022     Patient will increase functional independence with mobility by performin. Sit to stand transfer with Supervision  2. Bed to chair transfer with Supervision using LRAD  3. Gait  x 150 feet with Supervision using LRAD.   4. Ascend/descend 3 stair with right Handrails Stand-by Assistance using no AD.   5. Lower extremity exercise program x30 reps per handout, with independence                     History:     Past Medical History:   Diagnosis Date    Cancer     skin to Rt forearm and face    COPD (chronic obstructive pulmonary disease)     Hyperlipidemia     Hypertension     Pseudoaneurysm        Past Surgical History:   Procedure Laterality Date    ABDOMINAL SURGERY      stents placed in liver and large intestines, per patient    CAROTID STENT      CORONARY STENT PLACEMENT  2000    DISSECTION OF NECK Bilateral 2022    Procedure: DISSECTION, NECK;  Surgeon: Naeem Smalls MD;  Location: [address];  Service: ENT;  Laterality: Bilateral;    ESOPHAGOGASTRODUODENOSCOPY W/ PEG N/A 2022    Procedure: EGD, WITH PEG TUBE INSERTION;  Surgeon: Anthony Calabrese MD;  Location: [address];  Service: General;  Laterality: N/A;    EYE SURGERY      Cataract bilateral    femoral stents      bilateral    FLAP PROCEDURE N/A 1/3/2022    Procedure: CREATION, FREE FLAP;  Surgeon: Naeem Smalls MD;  Location: [facility] OR [address] [place] [address];  Service: ENT;  Laterality: N/A;     FLAP PROCEDURE Right 1/4/2022    Procedure: CREATION, FREE FLAP;  Surgeon: Stacey Conde MD;  [facility];  Service: ENT;  Laterality: Right;  Ischemic start 1203  Ischemic stop 1353    GLOSSECTOMY N/A 1/4/2022    Procedure: GLOSSECTOMY;  Surgeon: Naeem Smalls MD;  Location: [facility] OR [address] [place] [address];  Service: ENT;  Laterality: N/A;    RADICAL NECK DISSECTION Left 1/3/2022    Procedure: DISSECTION, NECK, RADICAL;  Surgeon: Naeem Smalls MD;  Location: [facility] OR [address] [place] [address];  Service: ENT;  Laterality: Left;    SKIN CANCER EXCISION      TRACHEOTOMY N/A 1/4/2022    Procedure: TRACHEOTOMY;  Surgeon: Naeem Smalls MD;  Location: [facility] OR [address] [place] [address];  Service: ENT;  Laterality: N/A;       Time Tracking:     PT Received On: 01/05/22  PT Start Time: 0924     PT Stop Time: 0947  PT Total Time (min): 23 min     Billable Minutes: Evaluation 1 procedure and Therapeutic Exercise 10 minutes    Anuradha Herrera, PT, DPT  1/5/2022  Pager: [phone]

## 2022-01-05 NOTE — ASSESSMENT & PLAN NOTE
Fareed Richard . is a 71 y.o. M with hx of squamous cell carcinoma, HTN, CAD s/p stents, carotid stenosis s/p carotid endarterectomy, PAD s/p b/l femoral atherectomies and other LE interventions who was admitted to SICU and will undergo glossectomy and face/neck reconstruction with ENT on 1/4/21.    Neuro  - No sedation  - Pain controlled: tylenol, oxycodone/dilaudid prn     CV  #CAD s/p stents  #Carotid stenosis s/p endarterectomy  #PVS s/p multiple LE interventions including b/l femoral arthetectomies  - continue atorv, ASA and holding plavix    #HTN  - Home meds: Amlodipine 10, Hydralazine 25 TID< Losartan 100, Metoprolol 200, Clonidine 0.1  - Held after surgery due to hypotension  - Restarted Metoprolol 100 BID, Amlodipine 10, add others as necessary     Pulm  #COPD  #Former smoker  - venotlin inhaler  - duonebs prn    GI  - no active issues  - Nutrition consult, TF to start today    Renal  No active issues    Heme/Onc  #Squamous Cell Carcinoma of tongue   - S/p glossectomy and face/neck reconstruction with flap with ENT 1/4/21  - q1h flap checks     Endo  - no active issues      Psych  #alcohol use  - patient reports drinking 3-4 beers per night, last drink was 1/1  - Thiamine/Folate  - CIWA protocol

## 2022-01-05 NOTE — PLAN OF CARE
Problem: SLP Goal  Goal: SLP Goal  Description: Speech Language Pathology Goals  Goals expected to be met by 1/12    1. Pt will tolerate PMSV for 5 minutes with >92% spO2 to increase phonation, respiration and swallowing aspects  2. Pt/family will don/doff PMSV x1 during session with min cues  3. Pt will vocalize with PMSV in place to increase verbal expression.         Outcome: Ongoing, Progressing   PMSV eval initiated. Please see note for details.   1/5/2022

## 2022-01-05 NOTE — PROGRESS NOTES
[facility]  Critical Care - Surgery  Progress Note    Patient Name: Fareed Richard Jr.  MRN: [id number]  Admission Date: 1/3/2022  Hospital Length of Stay: 2 days  Code Status: Full Code  Attending Provider: Naeem Smalls MD  Primary Care Provider: Kodi Tubbs MD   Principal Problem: Squamous cell cancer of tongue    Subjective:     Hospital/ICU Course:  Fareed Richard Jr. is a 71 y.o. M with hx of squamous cell carcinoma, HTN, CAD s/p stents, carotid stenosis s/p carotid endarterectomy in 2018, PAD s/p b/l femoral atherectomies and other LE interventions now s/p surgical removal of mass and face/neck reconstruction surgery with ENT on 1/4.       Interval History: NAEON. Hypertensive requiring 1 dose hydralazine. Flap with + triphasic artery signal throughout night. R DP remains with pulses, no signs of hematoma at R thigh incision. Pain well controlled.     Medications:  Continuous Infusions:   sodium chloride 0.9% 125 mL/hr at 01/05/22 0600    sodium chloride 0.9% Stopped (01/05/22 0509)     Scheduled Meds:   acetaminophen  650 mg Per G Tube Q6H    amLODIPine  10 mg Per G Tube Daily    ampicillin-sulbactim (UNASYN) IVPB  3 g Intravenous Q6H    aspirin  81 mg Per G Tube Daily    atorvastatin  20 mg Per G Tube Daily    bacitracin zinc   Topical (Top) BID    docusate  100 mg Per G Tube BID    enoxaparin  40 mg Subcutaneous Daily    fluticasone furoate  1 puff Inhalation Daily    folic acid (FOLVITE) IVPB 1 mg  1 mg Intravenous Daily    LIDOcaine (PF) 10 mg/ml (1%)  1 mL Other Once    magnesium sulfate IVPB  1 g Intravenous Once    metoprolol tartrate  100 mg Per G Tube BID    thiamine (VITAMIN B1) IVPB  100 mg Intravenous Daily    tiotropium bromide  2 puff Inhalation Daily     PRN Meds:albuterol-ipratropium, HYDROmorphone, lorazepam, ondansetron, oxyCODONE, oxyCODONE, sodium chloride 0.9%     Review of patient's allergies indicates:  No Known Allergies  Objective:      Vital Signs (24h Range):  Temp:  [97 °F (36.1 °C)-99.5 °F (37.5 °C)] 98 °F (36.7 °C)  Pulse:  [78-97] 81  Resp:  [10-24] 14  SpO2:  [90 %-100 %] 97 %  BP: ()/(48-65) 104/62  Arterial Line BP: ()/(38-62) 148/51       Lines/Drains/Airways     Drain                 Gastrostomy/Enterostomy 01/04/22 Percutaneous endoscopic gastrostomy (PEG) LUQ 1 day         Closed/Suction Drain 01/04/22 1244 Right;Anterior Thigh Bulb 15 Fr. <1 day         Closed/Suction Drain 01/04/22 1246 Right;Anterior Thigh Bulb 15 Fr. <1 day         Closed/Suction Drain 01/04/22 1450 Left Neck Bulb 15 Fr. <1 day         Closed/Suction Drain 01/04/22 1450 Right Neck Bulb 15 Fr. <1 day         Closed/Suction Drain 01/04/22 1450 Right Neck Bulb 15 Fr. <1 day         Urethral Catheter 01/04/22 0732 Non-latex 16 Fr. <1 day          Airway                 Airway - Non-Surgical 01/04/22 0723 <1 day         Surgical Airway 01/04/22 1546 Shiley Cuffed <1 day          Arterial Line            Arterial Line 01/04/22 0740 Left Radial <1 day          Peripheral Intravenous Line                 Peripheral IV - Single Lumen 01/03/22 1307 20 G Anterior;Proximal;Right Forearm 1 day         Peripheral IV - Single Lumen 01/04/22 0516 18 G Right Antecubital 1 day                Physical Exam  HENT:      Head:      Comments: Cervical facial flap soft, appropriate coloring, + triphasic artery  Pulses, R side slightly more edematous  Fresh trach CDI     Nose: No congestion.      Mouth/Throat:      Mouth: Mucous membranes are moist.   Eyes:      Extraocular Movements: Extraocular movements intact.      Pupils: Pupils are equal, round, and reactive to light.   Cardiovascular:      Rate and Rhythm: Normal rate and regular rhythm.      Pulses: Normal pulses.      Heart sounds: Normal heart sounds. No murmur heard.      Pulmonary:      Effort: No respiratory distress.      Breath sounds: Normal breath sounds. No wheezing.      Comments: Trach collar  Abdominal:       General: Abdomen is flat. There is no distension.      Palpations: Abdomen is soft.      Tenderness: There is no abdominal tenderness.      Comments: G tube in place   Musculoskeletal:         General: No swelling or tenderness.      Cervical back: Normal range of motion and neck supple. No rigidity.      Comments: Donor Site: R thigh incision CDI, no signs of hematoma. R DP pulses intact. Drains with minimal SS output   Skin:     General: Skin is warm and dry.   Neurological:      General: No focal deficit present.      Mental Status: He is alert and oriented to person, place, and time.          Significant Labs:  CBC:   Recent Labs   Lab 01/05/22  0311   WBC 13.17*   RBC 3.12*   HGB 9.2*   HCT 29.0*      MCV 93   MCH 29.5   MCHC 31.7*     CMP:   Recent Labs   Lab 01/04/22  1636 01/04/22  1636 01/05/22  0311   *   < > 147*   CALCIUM 7.9*   < > 7.6*   ALBUMIN 2.7*  --   --    PROT 4.5*  --   --    *   < > 133*   K 4.5   < > 4.2   CO2 18*   < > 20*      < > 103   BUN 6*   < > 8   CREATININE 0.6   < > 0.5   ALKPHOS 43*  --   --    ALT 9*  --   --    AST 17  --   --    BILITOT 1.6*  --   --     < > = values in this interval not displayed.     Coagulation:   Recent Labs   Lab 01/04/22  0405   LABPROT 10.3   INR 0.9   APTT 29.3       Significant Diagnostics:  I have reviewed all pertinent imaging results/findings within the past 24 hours.    Assessment/Plan:     * Squamous cell cancer of tongue  Fareed Richard . is a 71 y.o. M with hx of squamous cell carcinoma, HTN, CAD s/p stents, carotid stenosis s/p carotid endarterectomy, PAD s/p b/l femoral atherectomies and other LE interventions who was admitted to SICU and will undergo glossectomy and face/neck reconstruction with ENT on 1/4/21.    Neuro  - No sedation  - Pain controlled: tylenol, oxycodone/dilaudid prn     CV  #CAD s/p stents  #Carotid stenosis s/p endarterectomy  #PVS s/p multiple LE interventions including b/l femoral  arthetectomies  - continue atorv, ASA and holding plavix    #HTN  - Home meds: Amlodipine 10, Hydralazine 25 TID< Losartan 100, Metoprolol 200, Clonidine 0.1  - Held after surgery due to hypotension  - Restarted Metoprolol 100 BID, Amlodipine 10, add others as necessary     Pulm  #COPD  #Former smoker  - venotlin inhaler  - duonebs prn    GI  - no active issues  - Nutrition consult, TF to start today    Renal  No active issues    Heme/Onc  #Squamous Cell Carcinoma of tongue   - S/p glossectomy and face/neck reconstruction with flap with ENT 1/4/21  - q1h flap checks     Endo  - no active issues      Psych  #alcohol use  - patient reports drinking 3-4 beers per night, last drink was 1/1  - Thiamine/Folate  - CIWA protocol        Critical secondary to Patient has a condition that poses threat to life and bodily function: ENT Flap checks q1h.     Critical care was time spent personally by me on the following activities: development of treatment plan with patient or surrogate and bedside caregivers, discussions with consultants, evaluation of patient's response to treatment, examination of patient, ordering and performing treatments and interventions, ordering and review of laboratory studies, ordering and review of radiographic studies, pulse oximetry, re-evaluation of patient's condition.  This critical care time did not overlap with that of any other provider or involve time for any procedures.     Valencia Miller MD  Critical Care - Surgery  [facility]

## 2022-01-05 NOTE — NURSING
Pt alert - nods/gestures appropriately and follows commands to all 4 extremities. MAP within goal >65 - 1 dose IV hydralazine administered x1 this shift for hypertension. HR NSR on bedside monitor. Sats maintained via trach collar - 10L 40%. Afebrile. Pt denies c/o pain. Flap checks q1h and implanted doppler in place - see flowsheets for more info. RACHEL drains to L and R neck and R thigh in place with thin SS output - see flowsheets for output amounts. Lainez to gravity in place. Pt with no BM overnight. Electrolytes replaced per order. Pt and pt's wife updated on and agreeable to plan of care.

## 2022-01-05 NOTE — PLAN OF CARE
Discharge Recommendation: HHPT.    Evaluation completed today. PT goals appropriate.    Patient is safe to perform bed <> chair transfe with CGA with nursing staff.    Please continue Progressive Mobility Protocol as appropriate.    Anuradha Herrera, PT, DPT  2022  Pager: [phone]      Problem: Physical Therapy Goal  Goal: Physical Therapy Goal  Description: Goals to be met by: 2022     Patient will increase functional independence with mobility by performin. Sit to stand transfer with Supervision  2. Bed to chair transfer with Supervision using LRAD  3. Gait  x 150 feet with Supervision using LRAD.   4. Ascend/descend 3 stair with right Handrails Stand-by Assistance using no AD.   5. Lower extremity exercise program x30 reps per handout, with independence    Outcome: Ongoing, Progressing

## 2022-01-05 NOTE — PLAN OF CARE
Problem: Occupational Therapy Goal  Goal: Occupational Therapy Goal  Description: Goals to be met by: 7 days 1/12/22     Patient will increase functional independence with ADLs by performing:    Pt to complete standing g/h skills with SBA  Pt to complete UE dressing with DARRYL    Pt to complete LE dressing with MIN A   Pt to complete toileting with SBA  Pt to complete t/f to commode, bed and chair with SBA    Outcome: Ongoing, Progressing

## 2022-01-05 NOTE — PROGRESS NOTES
[facility]  Otorhinolaryngology-Head & Neck Surgery  Progress Note    Subjective:     Post-Op Info:  Procedure(s) (LRB):  GLOSSECTOMY (N/A)  TRACHEOTOMY (N/A)  EGD, WITH PEG TUBE INSERTION (N/A)  CREATION, FREE FLAP (Right)  DISSECTION, NECK (Bilateral)   1 Day Post-Op  Hospital Day: 3     Interval History: Hypotensive initially when out from OR, but then hypertensive this AM. No issues with the flap. Patient reports pain is controlled.     Medications:  Continuous Infusions:   sodium chloride 0.9% 125 mL/hr at 01/05/22 0701    sodium chloride 0.9% Stopped (01/05/22 0509)     Scheduled Meds:   acetaminophen  650 mg Per G Tube Q6H    amLODIPine  10 mg Per G Tube Daily    ampicillin-sulbactim (UNASYN) IVPB  3 g Intravenous Q6H    aspirin  81 mg Per G Tube Daily    atorvastatin  20 mg Per G Tube Daily    bacitracin zinc   Topical (Top) BID    docusate  100 mg Per G Tube BID    enoxaparin  40 mg Subcutaneous Daily    fluticasone furoate  1 puff Inhalation Daily    folic acid (FOLVITE) IVPB 1 mg  1 mg Intravenous Daily    LIDOcaine (PF) 10 mg/ml (1%)  1 mL Other Once    metoprolol tartrate  100 mg Per G Tube BID    thiamine (VITAMIN B1) IVPB  100 mg Intravenous Daily    tiotropium bromide  2 puff Inhalation Daily     PRN Meds:albuterol-ipratropium, HYDROmorphone, lorazepam, ondansetron, oxyCODONE, oxyCODONE, sodium chloride 0.9%     Review of patient's allergies indicates:  No Known Allergies  Objective:     Vital Signs (24h Range):  Temp:  [97 °F (36.1 °C)-99.5 °F (37.5 °C)] 98.2 °F (36.8 °C)  Pulse:  [78-98] 98  Resp:  [10-24] 12  SpO2:  [90 %-100 %] 96 %  BP: ()/(48-65) 107/63  Arterial Line BP: ()/(38-62) 146/61     Date 01/05/22 0700 - 01/06/22 0659   Shift 2833-0694 4842-3139 9456-7937 24 Hour Total   INTAKE   I.V.(mL/kg) 205.2(3.1)   205.2(3.1)   NG/GT 0   0   IV Piggyback 15.1   15.1   Shift Total(mL/kg) 220.3(3.3)   220.3(3.3)   OUTPUT   Urine(mL/kg/hr) 75   75    Shift Total(mL/kg) 75(1.1)   75(1.1)   Weight (kg) 66.9 66.9 66.9 66.9     Lines/Drains/Airways     Drain                 Gastrostomy/Enterostomy 01/04/22 Percutaneous endoscopic gastrostomy (PEG) LUQ 1 day         Urethral Catheter 01/04/22 0732 Non-latex 16 Fr. 1 day         Closed/Suction Drain 01/04/22 1244 Right;Anterior Thigh Bulb 15 Fr. <1 day         Closed/Suction Drain 01/04/22 1246 Right;Anterior Thigh Bulb 15 Fr. <1 day         Closed/Suction Drain 01/04/22 1450 Left Neck Bulb 15 Fr. <1 day         Closed/Suction Drain 01/04/22 1450 Right Neck Bulb 15 Fr. <1 day         Closed/Suction Drain 01/04/22 1450 Right Neck Bulb 15 Fr. <1 day          Airway                 Airway - Non-Surgical 01/04/22 0723 1 day         Surgical Airway 01/04/22 1546 Shiley Cuffed <1 day          Arterial Line            Arterial Line 01/04/22 0740 Left Radial 1 day          Peripheral Intravenous Line                 Peripheral IV - Single Lumen 01/03/22 1307 20 G Anterior;Proximal;Right Forearm 1 day         Peripheral IV - Single Lumen 01/04/22 0516 18 G Right Antecubital 1 day                Physical Exam   NAD, alert, and awake   NC/AT, EOMI, clear sclera  Normal external nose   MMM, rosemary-tongue ALTFF in place with good color that approximates the thigh, good tugar and warm, strong arterial doppler signal, and good venous signal from implantable doppler   Neck soft, advancement flaps in place without duskiness, staples intact, JPx2 on right with s/s output, RACHEL x 1 on left with s/s output   6-0 cuffed trach sutured in place, cuff let down this morning   Normal work of breathing, on trach collar   G tube in place  Lainez in place  A line in place     Significant Labs:  CBC:   Recent Labs   Lab 01/05/22  0311   WBC 13.17*   RBC 3.12*   HGB 9.2*   HCT 29.0*      MCV 93   MCH 29.5   MCHC 31.7*     CMP:   Recent Labs   Lab 01/04/22  1636 01/04/22  1636 01/05/22  0311   *   < > 147*   CALCIUM 7.9*   < > 7.6*  "  ALBUMIN 2.7*  --   --    PROT 4.5*  --   --    *   < > 133*   K 4.5   < > 4.2   CO2 18*   < > 20*      < > 103   BUN 6*   < > 8   CREATININE 0.6   < > 0.5   ALKPHOS 43*  --   --    ALT 9*  --   --    AST 17  --   --    BILITOT 1.6*  --   --     < > = values in this interval not displayed.       Significant Diagnostics:  I have reviewed all pertinent imaging results/findings within the past 24 hours.    Assessment/Plan:     * Squamous cell cancer of tongue  71M with [id number] SCC of the oral tongue s/p total glossectomy, bilateral neck dissections levels I-IV, tracheostomy, and ALTFF reconstruction with G tube placement by general surgery. Doing well.     ENT   -Continue q1h flap checks: Record Doppler Pulses (artery and vein) ,Capillary Refill , Color, Turgor, temperature.   -Neurovascular Checks q1h of bilateral upper and lower extremities   -Keep head elevated and in neutral position   -Sign above bed that reads "No circumferential Neck Ties"   -Sign above bed that reads "No ties around tracheostomy"   -NO VASOPRESSORS unless cleared by ENT Head and Neck Surgery Attending   -FOR ANY FLAP ISSUES, PLEASE PHONE ENT RESIDENT ON CALL.  -Please label drains carefully and document accordingly  -Bacitracin to all incisions/staple lines  -SLP consult for new tracheostomy and total glossectomy     Neuro  -pain control    -scheduled tylenol    -oxycodone/dilaudid PRN   -CIWA protocol as precaution    -thiamine and folate     CV  -discontinue meghann   -home medications restarted (amlodipine, metoprolol)    -metoprolol changed from succinate to tartrate. Have some room to titrate up if needed for BP control   -telemetry   -home atorvastatin  -on ASA. Patient reported not taking blood thinner pre-op. Will clarify if he was taking Plavix. Hold Plavix for now.       Resp  -fresh trach care   -trach collar   -will likely change to cuffless trach on Sunday vs Monday   -trach teaching with respiratory   -continuous pulse " ox   -inhalers (fluticasone and tiotropium daily, duonebs PRN)    FENGI  -strict NPO, mIVFs  -trickle TFs today. Plan to advance slowly tomorrow   -Nutrition consulted for TF recommendations. Prefer Impact Peptide 1.5   -zofran PRN   -colace for bowel regimen     Renal  -discontinue flores     Heme/ID   -Unasyn x 72 hours post-op     Endocrine   -no issues, monitor glucose     MSK   -PT/OT  -OOB     Ppx: L40, SCDs, NIKITA hose     Dispo: continue ICU care for q1h flap checks    Discussed ENT plan with SICU resident           Annmarie Oliveira MD  Otorhinolaryngology-Head & Neck Surgery  [facility]

## 2022-01-05 NOTE — SUBJECTIVE & OBJECTIVE
Interval History: Hypotensive initially when out from OR, but then hypertensive this AM. No issues with the flap. Patient reports pain is controlled.     Medications:  Continuous Infusions:   sodium chloride 0.9% 125 mL/hr at 01/05/22 0701    sodium chloride 0.9% Stopped (01/05/22 0509)     Scheduled Meds:   acetaminophen  650 mg Per G Tube Q6H    amLODIPine  10 mg Per G Tube Daily    ampicillin-sulbactim (UNASYN) IVPB  3 g Intravenous Q6H    aspirin  81 mg Per G Tube Daily    atorvastatin  20 mg Per G Tube Daily    bacitracin zinc   Topical (Top) BID    docusate  100 mg Per G Tube BID    enoxaparin  40 mg Subcutaneous Daily    fluticasone furoate  1 puff Inhalation Daily    folic acid (FOLVITE) IVPB 1 mg  1 mg Intravenous Daily    LIDOcaine (PF) 10 mg/ml (1%)  1 mL Other Once    metoprolol tartrate  100 mg Per G Tube BID    thiamine (VITAMIN B1) IVPB  100 mg Intravenous Daily    tiotropium bromide  2 puff Inhalation Daily     PRN Meds:albuterol-ipratropium, HYDROmorphone, lorazepam, ondansetron, oxyCODONE, oxyCODONE, sodium chloride 0.9%     Review of patient's allergies indicates:  No Known Allergies  Objective:     Vital Signs (24h Range):  Temp:  [97 °F (36.1 °C)-99.5 °F (37.5 °C)] 98.2 °F (36.8 °C)  Pulse:  [78-98] 98  Resp:  [10-24] 12  SpO2:  [90 %-100 %] 96 %  BP: ()/(48-65) 107/63  Arterial Line BP: ()/(38-62) 146/61     Date 01/05/22 0700 - 01/06/22 0659   Shift 3963-8463 1791-1750 7957-3835 24 Hour Total   INTAKE   I.V.(mL/kg) 205.2(3.1)   205.2(3.1)   NG/GT 0   0   IV Piggyback 15.1   15.1   Shift Total(mL/kg) 220.3(3.3)   220.3(3.3)   OUTPUT   Urine(mL/kg/hr) 75   75   Shift Total(mL/kg) 75(1.1)   75(1.1)   Weight (kg) 66.9 66.9 66.9 66.9     Lines/Drains/Airways     Drain                 Gastrostomy/Enterostomy 01/04/22 Percutaneous endoscopic gastrostomy (PEG) LUQ 1 day         Urethral Catheter 01/04/22 0732 Non-latex 16 Fr. 1 day         Closed/Suction Drain 01/04/22 7636  Right;Anterior Thigh Bulb 15 Fr. <1 day         Closed/Suction Drain 01/04/22 1246 Right;Anterior Thigh Bulb 15 Fr. <1 day         Closed/Suction Drain 01/04/22 1450 Left Neck Bulb 15 Fr. <1 day         Closed/Suction Drain 01/04/22 1450 Right Neck Bulb 15 Fr. <1 day         Closed/Suction Drain 01/04/22 1450 Right Neck Bulb 15 Fr. <1 day          Airway                 Airway - Non-Surgical 01/04/22 0723 1 day         Surgical Airway 01/04/22 1546 Shiley Cuffed <1 day          Arterial Line            Arterial Line 01/04/22 0740 Left Radial 1 day          Peripheral Intravenous Line                 Peripheral IV - Single Lumen 01/03/22 1307 20 G Anterior;Proximal;Right Forearm 1 day         Peripheral IV - Single Lumen 01/04/22 0516 18 G Right Antecubital 1 day                Physical Exam   NAD, alert, and awake   NC/AT, EOMI, clear sclera  Normal external nose   MMM, rosemary-tongue ALTFF in place with good color that approximates the thigh, good tugar and warm, strong arterial doppler signal, and good venous signal from implantable doppler   Neck soft, advancement flaps in place without duskiness, staples intact, JPx2 on right with s/s output, RACHEL x 1 on left with s/s output   6-0 cuffed trach sutured in place, cuff let down this morning   Normal work of breathing, on trach collar   G tube in place  Lainez in place  A line in place     Significant Labs:  CBC:   Recent Labs   Lab 01/05/22  0311   WBC 13.17*   RBC 3.12*   HGB 9.2*   HCT 29.0*      MCV 93   MCH 29.5   MCHC 31.7*     CMP:   Recent Labs   Lab 01/04/22  1636 01/04/22  1636 01/05/22  0311   *   < > 147*   CALCIUM 7.9*   < > 7.6*   ALBUMIN 2.7*  --   --    PROT 4.5*  --   --    *   < > 133*   K 4.5   < > 4.2   CO2 18*   < > 20*      < > 103   BUN 6*   < > 8   CREATININE 0.6   < > 0.5   ALKPHOS 43*  --   --    ALT 9*  --   --    AST 17  --   --    BILITOT 1.6*  --   --     < > = values in this interval not displayed.       Significant  Diagnostics:  I have reviewed all pertinent imaging results/findings within the past 24 hours.

## 2022-01-06 LAB
ANION GAP SERPL CALC-SCNC: 8 MMOL/L (ref 8–16)
BASOPHILS # BLD AUTO: 0.02 K/UL (ref 0–0.2)
BASOPHILS NFR BLD: 0.2 % (ref 0–1.9)
BUN SERPL-MCNC: 8 MG/DL (ref 8–23)
CALCIUM SERPL-MCNC: 8.2 MG/DL (ref 8.7–10.5)
CHLORIDE SERPL-SCNC: 103 MMOL/L (ref 95–110)
CO2 SERPL-SCNC: 28 MMOL/L (ref 23–29)
CREAT SERPL-MCNC: 0.6 MG/DL (ref 0.5–1.4)
DIFFERENTIAL METHOD: ABNORMAL
EOSINOPHIL # BLD AUTO: 0.1 K/UL (ref 0–0.5)
EOSINOPHIL NFR BLD: 1 % (ref 0–8)
ERYTHROCYTE [DISTWIDTH] IN BLOOD BY AUTOMATED COUNT: 15.1 % (ref 11.5–14.5)
EST. GFR  (AFRICAN AMERICAN): >60 ML/MIN/1.73 M^2
EST. GFR  (NON AFRICAN AMERICAN): >60 ML/MIN/1.73 M^2
GLUCOSE SERPL-MCNC: 104 MG/DL (ref 70–110)
HCT VFR BLD AUTO: 30.5 % (ref 40–54)
HGB BLD-MCNC: 9.5 G/DL (ref 14–18)
IMM GRANULOCYTES # BLD AUTO: 0.04 K/UL (ref 0–0.04)
IMM GRANULOCYTES NFR BLD AUTO: 0.4 % (ref 0–0.5)
LYMPHOCYTES # BLD AUTO: 0.7 K/UL (ref 1–4.8)
LYMPHOCYTES NFR BLD: 6.6 % (ref 18–48)
MAGNESIUM SERPL-MCNC: 2 MG/DL (ref 1.6–2.6)
MCH RBC QN AUTO: 29.5 PG (ref 27–31)
MCHC RBC AUTO-ENTMCNC: 31.1 G/DL (ref 32–36)
MCV RBC AUTO: 95 FL (ref 82–98)
MONOCYTES # BLD AUTO: 0.9 K/UL (ref 0.3–1)
MONOCYTES NFR BLD: 8.8 % (ref 4–15)
NEUTROPHILS # BLD AUTO: 8.5 K/UL (ref 1.8–7.7)
NEUTROPHILS NFR BLD: 83 % (ref 38–73)
NRBC BLD-RTO: 0 /100 WBC
PHOSPHATE SERPL-MCNC: 2.3 MG/DL (ref 2.7–4.5)
PLATELET # BLD AUTO: 258 K/UL (ref 150–450)
PMV BLD AUTO: 8.6 FL (ref 9.2–12.9)
POCT GLUCOSE: 104 MG/DL (ref 70–110)
POTASSIUM SERPL-SCNC: 3.9 MMOL/L (ref 3.5–5.1)
RBC # BLD AUTO: 3.22 M/UL (ref 4.6–6.2)
SODIUM SERPL-SCNC: 139 MMOL/L (ref 136–145)
WBC # BLD AUTO: 10.26 K/UL (ref 3.9–12.7)

## 2022-01-06 PROCEDURE — 97530 THERAPEUTIC ACTIVITIES: CPT

## 2022-01-06 PROCEDURE — 97116 GAIT TRAINING THERAPY: CPT

## 2022-01-06 PROCEDURE — 25000242 PHARM REV CODE 250 ALT 637 W/ HCPCS: Performed by: STUDENT IN AN ORGANIZED HEALTH CARE EDUCATION/TRAINING PROGRAM

## 2022-01-06 PROCEDURE — 94761 N-INVAS EAR/PLS OXIMETRY MLT: CPT

## 2022-01-06 PROCEDURE — 99291 PR CRITICAL CARE, E/M 30-74 MINUTES: ICD-10-PCS | Mod: GC,,, | Performed by: STUDENT IN AN ORGANIZED HEALTH CARE EDUCATION/TRAINING PROGRAM

## 2022-01-06 PROCEDURE — 25000003 PHARM REV CODE 250: Performed by: OTOLARYNGOLOGY

## 2022-01-06 PROCEDURE — 25000003 PHARM REV CODE 250

## 2022-01-06 PROCEDURE — 85025 COMPLETE CBC W/AUTO DIFF WBC: CPT | Performed by: STUDENT IN AN ORGANIZED HEALTH CARE EDUCATION/TRAINING PROGRAM

## 2022-01-06 PROCEDURE — 80048 BASIC METABOLIC PNL TOTAL CA: CPT | Performed by: STUDENT IN AN ORGANIZED HEALTH CARE EDUCATION/TRAINING PROGRAM

## 2022-01-06 PROCEDURE — 99900035 HC TECH TIME PER 15 MIN (STAT)

## 2022-01-06 PROCEDURE — 99291 CRITICAL CARE FIRST HOUR: CPT | Mod: GC,,, | Performed by: STUDENT IN AN ORGANIZED HEALTH CARE EDUCATION/TRAINING PROGRAM

## 2022-01-06 PROCEDURE — 63600175 PHARM REV CODE 636 W HCPCS: Performed by: STUDENT IN AN ORGANIZED HEALTH CARE EDUCATION/TRAINING PROGRAM

## 2022-01-06 PROCEDURE — 20000000 HC ICU ROOM

## 2022-01-06 PROCEDURE — 27000221 HC OXYGEN, UP TO 24 HOURS

## 2022-01-06 PROCEDURE — 25000003 PHARM REV CODE 250: Performed by: STUDENT IN AN ORGANIZED HEALTH CARE EDUCATION/TRAINING PROGRAM

## 2022-01-06 PROCEDURE — 84100 ASSAY OF PHOSPHORUS: CPT | Performed by: STUDENT IN AN ORGANIZED HEALTH CARE EDUCATION/TRAINING PROGRAM

## 2022-01-06 PROCEDURE — 83735 ASSAY OF MAGNESIUM: CPT | Performed by: STUDENT IN AN ORGANIZED HEALTH CARE EDUCATION/TRAINING PROGRAM

## 2022-01-06 PROCEDURE — 97535 SELF CARE MNGMENT TRAINING: CPT

## 2022-01-06 PROCEDURE — 99900026 HC AIRWAY MAINTENANCE (STAT)

## 2022-01-06 RX ADMIN — BACITRACIN: 500 OINTMENT TOPICAL at 09:01

## 2022-01-06 RX ADMIN — POTASSIUM & SODIUM PHOSPHATES POWDER PACK 280-160-250 MG 2 PACKET: 280-160-250 PACK at 08:01

## 2022-01-06 RX ADMIN — AMPICILLIN SODIUM AND SULBACTAM SODIUM 3 G: 2; 1 INJECTION, POWDER, FOR SOLUTION INTRAMUSCULAR; INTRAVENOUS at 11:01

## 2022-01-06 RX ADMIN — ASPIRIN 81 MG CHEWABLE TABLET 81 MG: 81 TABLET CHEWABLE at 08:01

## 2022-01-06 RX ADMIN — ATORVASTATIN CALCIUM 20 MG: 20 TABLET, FILM COATED ORAL at 08:01

## 2022-01-06 RX ADMIN — POTASSIUM & SODIUM PHOSPHATES POWDER PACK 280-160-250 MG 2 PACKET: 280-160-250 PACK at 05:01

## 2022-01-06 RX ADMIN — FOLIC ACID 1 MG: 1 TABLET ORAL at 08:01

## 2022-01-06 RX ADMIN — DOCUSATE SODIUM 100 MG: 50 LIQUID ORAL at 08:01

## 2022-01-06 RX ADMIN — ACETAMINOPHEN 650 MG: 325 TABLET ORAL at 05:01

## 2022-01-06 RX ADMIN — BACITRACIN 1 EACH: 500 OINTMENT TOPICAL at 08:01

## 2022-01-06 RX ADMIN — METOPROLOL TARTRATE 100 MG: 50 TABLET, FILM COATED ORAL at 09:01

## 2022-01-06 RX ADMIN — OXYCODONE HYDROCHLORIDE 10 MG: 5 SOLUTION ORAL at 04:01

## 2022-01-06 RX ADMIN — ENOXAPARIN SODIUM 40 MG: 40 INJECTION SUBCUTANEOUS at 05:01

## 2022-01-06 RX ADMIN — AMPICILLIN SODIUM AND SULBACTAM SODIUM 3 G: 2; 1 INJECTION, POWDER, FOR SOLUTION INTRAMUSCULAR; INTRAVENOUS at 05:01

## 2022-01-06 RX ADMIN — AMPICILLIN SODIUM AND SULBACTAM SODIUM 3 G: 2; 1 INJECTION, POWDER, FOR SOLUTION INTRAMUSCULAR; INTRAVENOUS at 04:01

## 2022-01-06 RX ADMIN — AMLODIPINE BESYLATE 10 MG: 10 TABLET ORAL at 08:01

## 2022-01-06 RX ADMIN — POTASSIUM BICARBONATE 50 MEQ: 978 TABLET, EFFERVESCENT ORAL at 05:01

## 2022-01-06 RX ADMIN — OXYCODONE HYDROCHLORIDE 10 MG: 5 SOLUTION ORAL at 08:01

## 2022-01-06 RX ADMIN — ACETAMINOPHEN 650 MG: 325 TABLET ORAL at 11:01

## 2022-01-06 RX ADMIN — ACETAMINOPHEN 325 MG: 325 TABLET ORAL at 01:01

## 2022-01-06 RX ADMIN — DOCUSATE SODIUM 100 MG: 50 LIQUID ORAL at 09:01

## 2022-01-06 RX ADMIN — METOPROLOL TARTRATE 100 MG: 50 TABLET, FILM COATED ORAL at 08:01

## 2022-01-06 RX ADMIN — THIAMINE HCL TAB 100 MG 100 MG: 100 TAB at 08:01

## 2022-01-06 NOTE — PROGRESS NOTES
[facility]  Otorhinolaryngology-Head & Neck Surgery  Progress Note    Subjective:     Post-Op Info:  Procedure(s) (LRB):  GLOSSECTOMY (N/A)  TRACHEOTOMY (N/A)  EGD, WITH PEG TUBE INSERTION (N/A)  CREATION, FREE FLAP (Right)  DISSECTION, NECK (Bilateral)   2 Days Post-Op  Hospital Day: 4     Interval History: NAEON. TFs @ 10. No nausea or abdominal pain. Was OOB yesterday. Lainez and meghann removed.     Medications:  Continuous Infusions:   sodium chloride 0.9% 50 mL/hr at 01/06/22 0600    sodium chloride 0.9% Stopped (01/06/22 0532)     Scheduled Meds:   acetaminophen  650 mg Per G Tube Q6H    amLODIPine  10 mg Per G Tube Daily    ampicillin-sulbactim (UNASYN) IVPB  3 g Intravenous Q6H    aspirin  81 mg Per G Tube Daily    atorvastatin  20 mg Per G Tube Daily    bacitracin zinc   Topical (Top) BID    docusate  100 mg Per G Tube BID    enoxaparin  40 mg Subcutaneous Daily    fluticasone furoate  1 puff Inhalation Daily    folic acid  1 mg Per G Tube Daily    LIDOcaine (PF) 10 mg/ml (1%)  1 mL Other Once    metoprolol tartrate  100 mg Per G Tube BID    thiamine  100 mg Per G Tube Daily    tiotropium bromide  2 puff Inhalation Daily     PRN Meds:albuterol-ipratropium, HYDROmorphone, lorazepam, magnesium oxide, magnesium oxide, ondansetron, oxyCODONE, oxyCODONE, potassium bicarbonate, potassium bicarbonate, potassium bicarbonate, potassium, sodium phosphates, potassium, sodium phosphates, potassium, sodium phosphates, sodium chloride 0.9%     Review of patient's allergies indicates:  No Known Allergies  Objective:     Vital Signs (24h Range):  Temp:  [97.5 °F (36.4 °C)-98.2 °F (36.8 °C)] 97.8 °F (36.6 °C)  Pulse:  [65-98] 77  Resp:  [9-35] 18  SpO2:  [87 %-99 %] 92 %  BP: (106-173)/(59-70) 145/65  Arterial Line BP: (141-156)/(52-61) 156/54       Lines/Drains/Airways     Drain                 Gastrostomy/Enterostomy 01/04/22 Percutaneous endoscopic gastrostomy (PEG) LUQ 2 days          Closed/Suction Drain 01/04/22 1244 Right;Anterior Thigh Bulb 15 Fr. 1 day         Closed/Suction Drain 01/04/22 1246 Right;Anterior Thigh Bulb 15 Fr. 1 day         Closed/Suction Drain 01/04/22 1450 Left Neck Bulb 15 Fr. 1 day         Closed/Suction Drain 01/04/22 1450 Right Neck Bulb 15 Fr. 1 day         Closed/Suction Drain 01/04/22 1450 Right Neck Bulb 15 Fr. 1 day          Airway                 Surgical Airway 01/04/22 1546 Shiley Cuffed 1 day          Peripheral Intravenous Line                 Peripheral IV - Single Lumen 01/03/22 1307 20 G Anterior;Proximal;Right Forearm 2 days         Peripheral IV - Single Lumen 01/04/22 0516 18 G Right Antecubital 2 days                Physical Exam     NAD, alert, and awake   NC/AT, EOMI, clear sclera  Normal external nose   MMM, rosemary-tongue ALTFF in place with good color that approximates the thigh, good tugar and warm, strong arterial doppler signal, and good venous signal from implantable doppler   Neck soft, advancement flaps in place without duskiness, staples intact, JPx2 on right with s/s output, RACHEL x 1 on left with s/s output   6-0 cuffed trach sutured in place, cuff down   Normal work of breathing, on trach collar   G tube in place    Significant Labs:  CBC:   Recent Labs   Lab 01/06/22  0319   WBC 10.26   RBC 3.22*   HGB 9.5*   HCT 30.5*      MCV 95   MCH 29.5   MCHC 31.1*     CMP:   Recent Labs   Lab 01/04/22  1636 01/05/22  0311 01/06/22  0319   *   < > 104   CALCIUM 7.9*   < > 8.2*   ALBUMIN 2.7*  --   --    PROT 4.5*  --   --    *   < > 139   K 4.5   < > 3.9   CO2 18*   < > 28      < > 103   BUN 6*   < > 8   CREATININE 0.6   < > 0.6   ALKPHOS 43*  --   --    ALT 9*  --   --    AST 17  --   --    BILITOT 1.6*  --   --     < > = values in this interval not displayed.       Significant Diagnostics:  I have reviewed all pertinent imaging results/findings within the past 24 hours.    Assessment/Plan:     * Squamous cell cancer of  "tongue  71M with [id number] SCC of the oral tongue s/p total glossectomy, bilateral neck dissections levels I-IV, tracheostomy, and ALTFF reconstruction with G tube placement by general surgery. Doing well.     ENT   -Continue q1h flap checks: Record Doppler Pulses (artery and vein) ,Capillary Refill , Color, Turgor, temperature.   -Neurovascular Checks q1h of bilateral upper and lower extremities   -Keep head elevated and in neutral position   -Sign above bed that reads "No circumferential Neck Ties"   -Sign above bed that reads "No ties around tracheostomy"   -NO VASOPRESSORS unless cleared by ENT Head and Neck Surgery Attending   -FOR ANY FLAP ISSUES, PLEASE PHONE ENT RESIDENT ON CALL.  -Please label drains carefully and document accordingly  -Bacitracin to all incisions/staple lines  -SLP consult for new tracheostomy and total glossectomy     Neuro  -pain control    -scheduled tylenol    -oxycodone/dilaudid PRN   -CIWA protocol as precaution    -thiamine and folate     CV  -home medications restarted (amlodipine, metoprolol)    -metoprolol changed from succinate to tartrate. Have some room to titrate up if needed for BP control   -telemetry   -home atorvastatin  -on ASA. Patient reported not taking blood thinner pre-op. Will clarify if he was taking Plavix. Hold Plavix for now.       Resp  -fresh trach care   -trach collar   -will likely change to cuffless trach on Sunday vs Monday   -trach teaching with respiratory   -continuous pulse ox   -inhalers (fluticasone and tiotropium daily, duonebs PRN)    FENGI  -strict NPO, mIVFs  -advance TFs slowly today   -zofran PRN   -colace for bowel regimen     Renal  -CTM, no issues     Heme/ID   -Unasyn x 72 hours post-op     Endocrine   -no issues, monitor glucose     MSK   -PT/OT  -OOB     Ppx: L40, SCDs, NIKITA hose     Dispo: continue ICU care for q1h flap checks    Discussed ENT plan with SICU resident           Annmarie Oliveira MD  Otorhinolaryngology-Head & Neck " Surgery  [facility] - Surgical Intensive [facility]

## 2022-01-06 NOTE — PT/OT/SLP PROGRESS
Occupational Therapy  Co- Treatment    Name: Fareed Richard Jr.  MRN: [id number]  Admitting Diagnosis:  Squamous cell cancer of tongue  2 Days Post-Op    Recommendations:     Discharge Recommendations: home with home health      Assessment:     Fareed Richard Jr. is a 71 y.o. male with a medical diagnosis of Squamous cell cancer of tongue.   Performance deficits affecting function are weakness,impaired endurance,impaired self care skills,impaired functional mobilty,gait instability,impaired balance,decreased upper extremity function. Pt tolerated session well with good effort, performance and progress. Continue to anticipate pt will be ready for d/c home with family assist when medically stable.     Rehab Prognosis:  Good; patient would benefit from acute skilled OT services to address these deficits and reach maximum level of function.       Plan:     Patient to be seen 4 x/week to address the above listed problems via self-care/home management,therapeutic activities,therapeutic exercises  · Plan of Care Expires:    · Plan of Care Reviewed with: patient    Subjective   Pt agreeable to therapy.   Pt denies pain or SOB.   Pt requested to be suctioned at start of session and middle of session.   Pain/Comfort:  · Pain Rating 1: 0/10    Objective:     Communicated with: nsg  prior to session.  Patient found seated in chair with RACHEL drains, tele, pulse ox, BP cuff, Doppler monitor and oxygen via trach collar   Co-tx completed this date to optimize functional performance and safety given acuity of medical status and medical complexity of pt in setting of ICu.   General Precautions: Standard, fall     Occupational Performance:     Functional Mobility/Transfers:  · Sit>stand with CGA     Activities of Daily Living:  Feeding: NPO  G/H; standing with CGA to wash face and hands. Pt needing cues to initiate greater movement of UE's during functional task. Pt demo improved functional use of UE's compared to prior to session.   UE/LE  dressing: TOTAL A     AMPAC 6 Click ADL: 12    Treatment & Education:  Pt with decreased oxygen saturation to 84% in standing during g/h skills. Pt denies SOB. nsg had increased oxygen support via trach collar to 15 LPM 60% in prep for activity. Once seated, oxygen saturation returned to 95% and nsg present to provide suctioning again.   Education provided to pt and spouse re: UE ROM exs to perform several times throughout the day to increase functional ROM/freedom of movement of UE's.     Education provided re: OT POC and safety with functional mobility/ADl skills.     Patient left up in chair with all lines intact, call button in reach and spouse and nsg presentEducation:      GOALS:   Multidisciplinary Problems     Occupational Therapy Goals        Problem: Occupational Therapy Goal    Goal Priority Disciplines Outcome Interventions   Occupational Therapy Goal     OT, PT/OT Ongoing, Progressing    Description: Goals to be met by: 7 days 1/12/22     Patient will increase functional independence with ADLs by performing:    Pt to complete standing g/h skills with SBA  Pt to complete UE dressing with DARRYL    Pt to complete LE dressing with MIN A   Pt to complete toileting with SBA  Pt to complete t/f to commode, bed and chair with SBA                     Time Tracking:     OT Date of Treatment: 01/06/22  OT Start Time: 0942  OT Stop Time: 1005  OT Total Time (min): 23 min    Billable Minutes:Self Care/Home Management 8  Therapeutic Activity 15    OT/NELLY: OT          1/6/2022

## 2022-01-06 NOTE — PLAN OF CARE
11:57 AM    The SW met with the patient and his wife bedside to discuss the therapy recommendations for home health services. The SW provided the patient's wife with a list of [facility] health providers.  The SW explained to the patient's wife the process in regards to obtaining home health services through [facility]. The patient's wife informed the SW that she was fine with the insurance company picking the home health provider for her . SW will continue to follow.    Jordan Aceves LMSW  Case Management [facility]  Ext: [phone]

## 2022-01-06 NOTE — ASSESSMENT & PLAN NOTE
"71M with [id number] SCC of the oral tongue s/p total glossectomy, bilateral neck dissections levels I-IV, tracheostomy, and ALTFF reconstruction with G tube placement by general surgery. Doing well.     ENT   -Continue q1h flap checks: Record Doppler Pulses (artery and vein) ,Capillary Refill , Color, Turgor, temperature.   -Neurovascular Checks q1h of bilateral upper and lower extremities   -Keep head elevated and in neutral position   -Sign above bed that reads "No circumferential Neck Ties"   -Sign above bed that reads "No ties around tracheostomy"   -NO VASOPRESSORS unless cleared by ENT Head and Neck Surgery Attending   -FOR ANY FLAP ISSUES, PLEASE PHONE ENT RESIDENT ON CALL.  -Please label drains carefully and document accordingly  -Bacitracin to all incisions/staple lines  -SLP consult for new tracheostomy and total glossectomy     Neuro  -pain control    -scheduled tylenol    -oxycodone/dilaudid PRN   -CIWA protocol as precaution    -thiamine and folate     CV  -home medications restarted (amlodipine, metoprolol)    -metoprolol changed from succinate to tartrate. Have some room to titrate up if needed for BP control   -telemetry   -home atorvastatin  -on ASA. Patient reported not taking blood thinner pre-op. Will clarify if he was taking Plavix. Hold Plavix for now.       Resp  -fresh trach care   -trach collar   -will likely change to cuffless trach on Sunday vs Monday   -trach teaching with respiratory   -continuous pulse ox   -inhalers (fluticasone and tiotropium daily, duonebs PRN)    FENGI  -strict NPO, mIVFs  -advance TFs slowly today   -zofran PRN   -colace for bowel regimen     Renal  -CTM, no issues     Heme/ID   -Unasyn x 72 hours post-op     Endocrine   -no issues, monitor glucose     MSK   -PT/OT  -OOB     Ppx: L40, SCDs, NIKITA hose     Dispo: continue ICU care for q1h flap checks    Discussed ENT plan with SICU resident     "

## 2022-01-06 NOTE — ASSESSMENT & PLAN NOTE
Fareed WILLIAM Jose Manuel Grande is a 71 y.o. M with hx of squamous cell carcinoma, HTN, CAD s/p stents, carotid stenosis s/p carotid endarterectomy, PAD s/p b/l femoral atherectomies and other LE interventions who was admitted to SICU and will undergo glossectomy and face/neck reconstruction with ENT on 1/4/21.    Neuro  - No sedation  - Pain controlled: tylenol, oxycodone/dilaudid prn     CV  #CAD s/p stents  #Carotid stenosis s/p endarterectomy  #PVS s/p multiple LE interventions including b/l femoral arthetectomies  - continue atorv, ASA and holding plavix    #HTN  - Home meds: continue Amlodipine 10, Metoprolol 200, restart as needed Hydralazine 25 TID, Losartan 100, Clonidine 0.1  - held BP meds after surgery, will slowly reintroduce     Pulm  #COPD  #Former smoker  - venotlin inhaler  - duonebs prn    #Tracheostomy 1/4  - fresh trach, well healing  - on trach collar 8L humidified O2, wean as tolerated    GI  - Nutrition consult, TF @10mml/hr, advance as tolerated  -F: mIVFs  -E: replete prn  -N: TF 10 ml/hr, advance as tolerated    Renal  No active issues, remove flores    Heme/Onc  #Squamous Cell Carcinoma of tongue   #s/p total glossectomy, tracheostomy and free flap reconstruction  - q1h flap checks, arterial doppler without issue  - unasyn started 1/5 for 3 day course (end date 1/7)     Endo  - no active issues      Psych  #alcohol use  - patient reports drinking 3-4 beers per night, last drink was 1/1  - Thiamine/Folate  - CIWA protocol

## 2022-01-06 NOTE — PROGRESS NOTES
[facility]  Critical Care - Surgery  Progress Note    Patient Name: Fareed Richard Jr.  MRN: [id number]  Admission Date: 1/3/2022  Hospital Length of Stay: 3 days  Code Status: Full Code  Attending Provider: Naeem Smalls MD  Primary Care Provider: Kodi Tubbs MD   Principal Problem: Squamous cell cancer of tongue    Subjective:     Hospital/ICU Course:  Fareed Richard Jr. is a 71 y.o. M with hx of squamous cell carcinoma, HTN, CAD s/p stents, carotid stenosis s/p carotid endarterectomy in 2018, PAD s/p b/l femoral atherectomies and other LE interventions s/p total glossectomy, tracheostomy and free flap reconstruction with ENT on 1/4.  The patient has been doing well since surgery, requiring limited pain control.      Interval History/Significant Events:   The patient is doing well since surgery.  He reports adequate pain control with tylenol and not requiring any prn pain meds.  He denies any chest pain or SOB.  He is breathing well through new tracheostomy with humidified 8L O2.    Follow-up For: Procedure(s) (LRB):  GLOSSECTOMY (N/A)  TRACHEOTOMY (N/A)  EGD, WITH PEG TUBE INSERTION (N/A)  CREATION, FREE FLAP (Right)  DISSECTION, NECK (Bilateral)    Post-Operative Day: 2 Days Post-Op    Objective:     Vital Signs (Most Recent):  Temp: 97.8 °F (36.6 °C) (01/06/22 0300)  Pulse: 77 (01/06/22 0630)  Resp: 18 (01/06/22 0630)  BP: (!) 145/65 (01/06/22 0600)  SpO2: (!) 92 % (01/06/22 0630) Vital Signs (24h Range):  Temp:  [97.5 °F (36.4 °C)-98.2 °F (36.8 °C)] 97.8 °F (36.6 °C)  Pulse:  [65-98] 77  Resp:  [9-35] 18  SpO2:  [87 %-99 %] 92 %  BP: (106-173)/(59-70) 145/65  Arterial Line BP: (141-156)/(51-61) 156/54     Weight: 66.9 kg (147 lb 7.8 oz)  Body mass index is 22.43 kg/m².      Intake/Output Summary (Last 24 hours) at 1/6/2022 0642  Last data filed at 1/6/2022 0600  Gross per 24 hour   Intake 2680.79 ml   Output 1392 ml   Net 1288.79 ml       Physical Exam  HENT:      Head:       Comments: Cervical facial flap soft, appropriate coloring, + triphasic artery  Pulses, Fresh trach CDI     Nose: No congestion.      Mouth/Throat:      Mouth: Mucous membranes are moist.   Eyes:      Extraocular Movements: Extraocular movements intact.      Pupils: Pupils are equal, round, and reactive to light.   Cardiovascular:      Rate and Rhythm: Normal rate and regular rhythm.      Pulses: Normal pulses.      Heart sounds: Normal heart sounds. No murmur heard.      Pulmonary:      Effort: No respiratory distress.      Breath sounds: Normal breath sounds. No wheezing.      Comments: Trach collar  Abdominal:      General: Abdomen is flat. There is no distension.      Palpations: Abdomen is soft.      Tenderness: There is no abdominal tenderness.      Comments: G tube in place   Musculoskeletal:         General: No swelling or tenderness.      Cervical back: Normal range of motion and neck supple. No rigidity.      Comments: Donor Site: R thigh incision CDI, no signs of hematoma. R DP pulses intact. Drains with minimal SS output   Skin:     General: Skin is warm and dry.   Neurological:      General: No focal deficit present.      Mental Status: He is alert and oriented to person, place, and time.         Vents:  On 8L O2 humidified through trach collar    Lines/Drains/Airways     Drain                 Gastrostomy/Enterostomy 01/04/22 Percutaneous endoscopic gastrostomy (PEG) LUQ 2 days         Closed/Suction Drain 01/04/22 1244 Right;Anterior Thigh Bulb 15 Fr. 1 day         Closed/Suction Drain 01/04/22 1246 Right;Anterior Thigh Bulb 15 Fr. 1 day         Closed/Suction Drain 01/04/22 1450 Left Neck Bulb 15 Fr. 1 day         Closed/Suction Drain 01/04/22 1450 Right Neck Bulb 15 Fr. 1 day         Closed/Suction Drain 01/04/22 1450 Right Neck Bulb 15 Fr. 1 day          Airway                 Surgical Airway 01/04/22 1546 Shiley Cuffed 1 day          Peripheral Intravenous Line                 Peripheral IV - Single  Lumen 01/03/22 1307 20 G Anterior;Proximal;Right Forearm 2 days         Peripheral IV - Single Lumen 01/04/22 0516 18 G Right Antecubital 2 days                Significant Labs:    CBC/Anemia Profile:  Recent Labs   Lab 01/04/22  1636 01/05/22  0311 01/06/22  0319   WBC 18.25* 13.17* 10.26   HGB 9.2* 9.2* 9.5*   HCT 29.5* 29.0* 30.5*    255 258   MCV 96 93 95   RDW 14.6* 15.0* 15.1*        Chemistries:  Recent Labs   Lab 01/04/22  1636 01/05/22  0311 01/06/22  0319   * 133* 139   K 4.5 4.2 3.9    103 103   CO2 18* 20* 28   BUN 6* 8 8   CREATININE 0.6 0.5 0.6   CALCIUM 7.9* 7.6* 8.2*   ALBUMIN 2.7*  --   --    PROT 4.5*  --   --    BILITOT 1.6*  --   --    ALKPHOS 43*  --   --    ALT 9*  --   --    AST 17  --   --    MG 2.0 1.8 2.0   PHOS 4.2 2.7 2.3*     Significant Imaging:  I have reviewed all pertinent imaging results/findings within the past 24 hours.    Assessment/Plan:     * Squamous cell cancer of tongue  Fareed Richard Jr. is a 71 y.o. M with hx of squamous cell carcinoma, HTN, CAD s/p stents, carotid stenosis s/p carotid endarterectomy, PAD s/p b/l femoral atherectomies and other LE interventions who was admitted to SICU and will undergo glossectomy and face/neck reconstruction with ENT on 1/4/21.    Neuro  - No sedation  - Pain controlled: tylenol, oxycodone/dilaudid prn     CV  #CAD s/p stents  #Carotid stenosis s/p endarterectomy  #PVS s/p multiple LE interventions including b/l femoral arthetectomies  - continue atorv, ASA and holding plavix    #HTN  - Home meds: continue Amlodipine 10, Metoprolol 200, restart as needed Hydralazine 25 TID, Losartan 100, Clonidine 0.1  - held BP meds after surgery, will slowly reintroduce     Pulm  #COPD  #Former smoker  - venotlin inhaler  - duonebs prn    #Tracheostomy 1/4  - fresh trach, well healing  - on trach collar 8L humidified O2, wean as tolerated    GI  - Nutrition consult, TF @10mml/hr, advance as tolerated  -F: mIVFs  -E: replete prn  -N:  TF 10 ml/hr, advance as tolerated    Renal  No active issues, remove flores    Heme/Onc  #Squamous Cell Carcinoma of tongue   #s/p total glossectomy, tracheostomy and free flap reconstruction  - q1h flap checks, arterial doppler without issue  - unasyn started 1/5 for 3 day course (end date 1/7)     Endo  - no active issues      Psych  #alcohol use  - patient reports drinking 3-4 beers per night, last drink was 1/1  - Thiamine/Folate  - [facility] protocol        Critical care was time spent personally by me on the following activities: development of treatment plan with patient or surrogate and bedside caregivers, discussions with consultants, evaluation of patient's response to treatment, examination of patient, ordering and performing treatments and interventions, ordering and review of laboratory studies, ordering and review of radiographic studies, pulse oximetry, re-evaluation of patient's condition.  This critical care time did not overlap with that of any other provider or involve time for any procedures.     Brenden Dalton, DO  Critical Care - Surgery  [facility]

## 2022-01-06 NOTE — PT/OT/SLP PROGRESS
Physical Therapy Co-Treatment    Patient Name:  Fareed Richard Jr.   MRN:  [id number]  Admitting Diagnosis:  Squamous cell cancer of tongue   Recent Surgery: Procedure(s) (LRB):  GLOSSECTOMY (N/A)  TRACHEOTOMY (N/A)  EGD, WITH PEG TUBE INSERTION (N/A)  CREATION, FREE FLAP (Right)  DISSECTION, NECK (Bilateral) 2 Days Post-Op  Admit Date: 1/3/2022  Length of Stay: 3 days    Recommendations:     Discharge Recommendations:  home with home health   Discharge Equipment Recommendations: none   Barriers to discharge: post-op, pain, impaired balance, decreased endurance, increased fall risk    Assessment:     Fareed Richard Jr. is a 71 y.o. male admitted with a medical diagnosis of Squamous cell cancer of tongue.  He presents with the following impairments/functional limitations:  weakness,impaired endurance,impaired self care skills,impaired functional mobilty,impaired balance,gait instability,decreased upper extremity function,decreased lower extremity function,pain,impaired cardiopulmonary response to activity. Pt tolerated session well today. Pt able to increase distance ambulated on this date pointing towards improved activity tolerance and pain control. Despite remaining on 15L 60% trach collar SPO2 did drop to 84% with ambulation and standing balance task. Pt able to quickly recover to 95% with seated rest break. Pt will continue to benefit from skilled PT services during this hospital admit to continue to improve transfer ability and efficiency as well as continue to progress pt's ambulation distance and cardiopulmonary endurance to maximize pt's functional independence and return to PLOF. After discharge pt will benefit from HHPT to further progress functional mobility and cardiopulmonary endurance as well as for home safety and energy conservation techniques.    Rehab Prognosis: Good; patient would benefit from acute skilled PT services to address these deficits and reach maximum level of function.    Recent Surgery:  "Procedure(s) (LRB):  GLOSSECTOMY (N/A)  TRACHEOTOMY (N/A)  EGD, WITH PEG TUBE INSERTION (N/A)  CREATION, FREE FLAP (Right)  DISSECTION, NECK (Bilateral) 2 Days Post-Op    Plan:     During this hospitalization, patient to be seen 4 x/week to address the identified rehab impairments via therapeutic activities,gait training,therapeutic exercises,neuromuscular re-education and progress toward the following goals:    · Plan of Care Expires:  02/04/22    Subjective     RN notified prior to session. No family/friensd present upon PT entrance into room.    Chief Complaint: None reported, pt did not endorse pain  Patient/Family Comments/goals: go home  Pain/Comfort:  · Pain Rating 1: 0/10  · Pain Addressed 1: Distraction,Reposition  · Pain Rating Post-Intervention 1: 0/10      Objective:     Additional staff present: OT for cotx due to pt's multiple medical comorbidities and functional deficits req'ing two skilled therapists to appropriately progress pt's musculoskeletal strength, neuromuscular control, and endurance while taking into consideration severe medical acuity in the ICU    Patient found up in chair with: telemetry,blood pressure cuff,pulse ox (continuous),Tracheostomy,oxygen,peripheral IV,PEG Tube,RACHEL drain (continuous doppler)   Cognition:   · Alert and Cooperative  · AxOx4  · Command following: Follows multistep verbal commands  · Fluency: clear/fluent  General Precautions: Standard, Cardiac fall   Orthopedic Precautions:N/A   Braces: N/A   Body mass index is 22.43 kg/m².  Oxygen Device: Trach Collar 40% & 10L --> increased to 60% and 15L for mobility by RN  Vitals: BP (!) 164/69 (BP Location: Left arm, Patient Position: Sitting)   Pulse 86   Temp 98 °F (36.7 °C) (Axillary)   Resp (!) 24   Ht 5' 8" (1.727 m)   Wt 66.9 kg (147 lb 7.8 oz)   SpO2 96%   BMI 22.43 kg/m²     Outcome Measures:  AM-PAC 6 CLICK MOBILITY  Turning over in bed (including adjusting bedclothes, sheets and blankets)?: 3  Sitting down on " and standing up from a chair with arms (e.g., wheelchair, bedside commode, etc.): 3  Moving from lying on back to sitting on the side of the bed?: 3  Moving to and from a bed to a chair (including a wheelchair)?: 3  Need to walk in hospital room?: 3  Climbing 3-5 steps with a railing?: 2  Basic Mobility Total Score: 17     Functional Mobility:    Bed Mobility:   · Pt found/returned to bedside chair    Transfers:   · Sit <> Stand Transfer: contact guard assistance with hand-held assist   · Stand <> Sit Transfer: contact guard assistance with hand-held assist   · [id number] from bedside chair    Standing Balance:   Static Standing Balance: Fair : able to stand unsupported without UE support and without LOB for 1 minute   Dynamic Standing Balance: Fair : stand independently unsupported, weight shift, and reach ipsilaterally. LOB noted when crossing midline.   Assistance Level Required: Contact Guard Assistance   Patient used: no assistive device    Time: ~5 minutes   Postural deviations noted: slouched posture, rounded shoulders and forward head   Comments: Time in unsupported standing focused on cardiopulmonary tolerance to unsupported standing as well as strength/endurance to perform dynamic balance activity safely with no LOB but drop in SPO2 from 92% to 85% despite remaining on 15L 60% O2. Pt able to respond to with hip strategy to internal/external perturbations with appropriate anticipatory and reactive responses with no LOB. 1 UE support needed throughout and pt able to complete balance challenges with anterior reaches inside KATIE with no LOB.      Gait:  · Patient ambulated: 16 ft   · Patient required: contact guard  · Patient used:  hand-held assist   · Gait Pattern observed: reciprocal gait  · Gait Deviation(s): decreased step length, wide base of support, decreased weight shift, steady gait, shuffle gait, flexed posture and decreased tammi  · Impairments due to: pain and decreased endurance  · all  lines remained intact throughout ambulation trial  · Comments: Pt with wide KATIE and decreased step length but no LOB. HHA used to widen KATIE.    Education:   Time provided for education, counseling and discussion of health disposition in regards to patient's current status   All questions answered within PT scope of practice and to patient's satisfaction   PT role in POC to address current functional deficits   Pt educated on proper body mechanics, safety techniques, and energy conservation with PT facilitation and cueing throughout session   Call nursing/pct to transfer to chair/use bathroom. Pt stated understanding.   Whiteboard updated with therapist name and pt's current mobility status documented above   Safe to perform step transfer to/from chair/bedside commode CGA and HHA w/ nursing/PCT present   Importance of OOB tolerance 8-10 hrs/day to improve lung ventilation and expansion as well as strengthen postural musculature    Patient left up in chair with all lines intact, call button in reach, RN notified and wife present.    GOALS:   Multidisciplinary Problems     Physical Therapy Goals        Problem: Physical Therapy Goal    Goal Priority Disciplines Outcome Goal Variances Interventions   Physical Therapy Goal     PT, PT/OT Ongoing, Progressing     Description: Goals to be met by: 2022     Patient will increase functional independence with mobility by performin. Sit to stand transfer with Supervision  2. Bed to chair transfer with Supervision using LRAD  3. Gait  x 150 feet with Supervision using LRAD.   4. Ascend/descend 3 stair with right Handrails Stand-by Assistance using no AD.   5. Lower extremity exercise program x30 reps per handout, with independence                   Time Tracking:     PT Received On: 22  PT Start Time: 0941     PT Stop Time: 1007  PT Total Time (min): 26 min   \    Billable Minutes:   · Gait Training 9 minutes and Therapeutic Activity 15  minutes    Treatment Type: Treatment  PT/PTA: PT       Anuradha Herrera PT, DPT  1/6/2022  Pager: [phone]

## 2022-01-06 NOTE — PLAN OF CARE
"      SICU PLAN OF CARE NOTE    Dx: Squamous cell cancer of tongue    Shift Events: Pt's V/S stable over shift, no acute events over night. Pt tolerating TF well with no residuals. Pt able to rest intermittently over night, no complaints of pain over shift. Plan to increase TF as tolerated, further independence with PT/OT, maintain comfort. POC reviewed with pt, all questions answered and encouraged. Will continue to monitor.     Goals of Care: MAP >65    Neuro: AAO x4, Follows Commands and Moves All Extremities    Vital Signs: BP (!) 145/65 (BP Location: Left arm, Patient Position: Lying)   Pulse 77   Temp 97.8 °F (36.6 °C) (Axillary)   Resp 18   Ht 5' 8" (1.727 m)   Wt 66.9 kg (147 lb 7.8 oz)   SpO2 (!) 92%   BMI 22.43 kg/m²     Cardiac: NSR, HR 70-80s    Respiratory: Trach Collar, 10L / 40% O2    Diet: NPO and Tube Feeds @ 10 ml/hr (goal overnight)    Gtts: MIVF @ 50 ml/hr    Urine Output: Voids Spontaneously 575 cc/shift    Drains: RACHEL drains, total output of 167 ml/shift    Flap checks: Q1H, no changes in flap assessment / neurovascular assessment, see flowsheet for details     Labs: daily, 50 mEq of K / 2 Phos packets given via PEG tube    Skin: No new skin breakdown noted. Pt's incisions and drain sites cleansed with sterile water and abx ointment applied. R leg dressing remains intact with dried drainage. Pt repositions independently, pillow support provided. Skin foam in place for prevention. Niru bed plugged in and working properly, waffle mattress inflated.     "

## 2022-01-06 NOTE — SUBJECTIVE & OBJECTIVE
Interval History/Significant Events:   The patient is doing well since surgery.  He reports adequate pain control with tylenol and not requiring any prn pain meds.  He denies any chest pain or SOB.  He is breathing well through new tracheostomy with humidified 8L O2.    Follow-up For: Procedure(s) (LRB):  GLOSSECTOMY (N/A)  TRACHEOTOMY (N/A)  EGD, WITH PEG TUBE INSERTION (N/A)  CREATION, FREE FLAP (Right)  DISSECTION, NECK (Bilateral)    Post-Operative Day: 2 Days Post-Op    Objective:     Vital Signs (Most Recent):  Temp: 97.8 °F (36.6 °C) (01/06/22 0300)  Pulse: 77 (01/06/22 0630)  Resp: 18 (01/06/22 0630)  BP: (!) 145/65 (01/06/22 0600)  SpO2: (!) 92 % (01/06/22 0630) Vital Signs (24h Range):  Temp:  [97.5 °F (36.4 °C)-98.2 °F (36.8 °C)] 97.8 °F (36.6 °C)  Pulse:  [65-98] 77  Resp:  [9-35] 18  SpO2:  [87 %-99 %] 92 %  BP: (106-173)/(59-70) 145/65  Arterial Line BP: (141-156)/(51-61) 156/54     Weight: 66.9 kg (147 lb 7.8 oz)  Body mass index is 22.43 kg/m².      Intake/Output Summary (Last 24 hours) at 1/6/2022 0642  Last data filed at 1/6/2022 0600  Gross per 24 hour   Intake 2680.79 ml   Output 1392 ml   Net 1288.79 ml       Physical Exam  HENT:      Head:      Comments: Cervical facial flap soft, appropriate coloring, + triphasic artery  Pulses, Fresh trach CDI     Nose: No congestion.      Mouth/Throat:      Mouth: Mucous membranes are moist.   Eyes:      Extraocular Movements: Extraocular movements intact.      Pupils: Pupils are equal, round, and reactive to light.   Cardiovascular:      Rate and Rhythm: Normal rate and regular rhythm.      Pulses: Normal pulses.      Heart sounds: Normal heart sounds. No murmur heard.      Pulmonary:      Effort: No respiratory distress.      Breath sounds: Normal breath sounds. No wheezing.      Comments: Trach collar  Abdominal:      General: Abdomen is flat. There is no distension.      Palpations: Abdomen is soft.      Tenderness: There is no abdominal tenderness.       Comments: G tube in place   Musculoskeletal:         General: No swelling or tenderness.      Cervical back: Normal range of motion and neck supple. No rigidity.      Comments: Donor Site: R thigh incision CDI, no signs of hematoma. R DP pulses intact. Drains with minimal SS output   Skin:     General: Skin is warm and dry.   Neurological:      General: No focal deficit present.      Mental Status: He is alert and oriented to person, place, and time.         Vents:  On 8L O2 humidified through trach collar    Lines/Drains/Airways     Drain                 Gastrostomy/Enterostomy 01/04/22 Percutaneous endoscopic gastrostomy (PEG) LUQ 2 days         Closed/Suction Drain 01/04/22 1244 Right;Anterior Thigh Bulb 15 Fr. 1 day         Closed/Suction Drain 01/04/22 1246 Right;Anterior Thigh Bulb 15 Fr. 1 day         Closed/Suction Drain 01/04/22 1450 Left Neck Bulb 15 Fr. 1 day         Closed/Suction Drain 01/04/22 1450 Right Neck Bulb 15 Fr. 1 day         Closed/Suction Drain 01/04/22 1450 Right Neck Bulb 15 Fr. 1 day          Airway                 Surgical Airway 01/04/22 1546 Shiley Cuffed 1 day          Peripheral Intravenous Line                 Peripheral IV - Single Lumen 01/03/22 1307 20 G Anterior;Proximal;Right Forearm 2 days         Peripheral IV - Single Lumen 01/04/22 0516 18 G Right Antecubital 2 days                Significant Labs:    CBC/Anemia Profile:  Recent Labs   Lab 01/04/22  1636 01/05/22  0311 01/06/22 0319   WBC 18.25* 13.17* 10.26   HGB 9.2* 9.2* 9.5*   HCT 29.5* 29.0* 30.5*    255 258   MCV 96 93 95   RDW 14.6* 15.0* 15.1*        Chemistries:  Recent Labs   Lab 01/04/22  1636 01/05/22  0311 01/06/22  0319   * 133* 139   K 4.5 4.2 3.9    103 103   CO2 18* 20* 28   BUN 6* 8 8   CREATININE 0.6 0.5 0.6   CALCIUM 7.9* 7.6* 8.2*   ALBUMIN 2.7*  --   --    PROT 4.5*  --   --    BILITOT 1.6*  --   --    ALKPHOS 43*  --   --    ALT 9*  --   --    AST 17  --   --    MG 2.0 1.8 2.0    PHOS 4.2 2.7 2.3*     Significant Imaging:  I have reviewed all pertinent imaging results/findings within the past 24 hours.

## 2022-01-06 NOTE — PLAN OF CARE
No acute events this shift. TF started @10cc/hr. Yaquelin and Fatou removed today.  MD on call for SICU notified. Patient sat up in chair throughout the day. Worked with PT/OT.     MAP>65. SpO2 >92% on 10L 40% Trach collar.   Gtts: NS @50cc/hr.   TF infusing @10cc/hr through PEG.   Flap checks performed q1. CIWA precautions maintained.     Skin: Skin tear noted to R arm, steri strips in place.

## 2022-01-06 NOTE — NURSING
Pt being transferred to [facility] from [facility] to free up negative pressure room, Report given to ARNOLD Robins RN, pt placed on portable monitor and O2 tank for transport, chart and personal belongings sent with pt, pt settled in [facility], VSS, flap perfusing, pt appears to be sleeping.

## 2022-01-06 NOTE — PLAN OF CARE
[facility]  Discharge Reassessment    Primary Care Provider: Kodi Tubbs MD    Expected Discharge Date: 1/7/2022    Reassessment (most recent)     Discharge Reassessment - 01/06/22 1154        Discharge Reassessment    Assessment Type Discharge Planning Reassessment     Communicated NISA with patient/caregiver Date not available/Unable to determine     Discharge Plan A Home Health     Discharge Plan B Home with family     DME Needed Upon Discharge  none     Discharge Barriers Identified None     Why the patient remains in the hospital Requires continued medical care        Post-Acute Status    Post-Acute Authorization Home Health     Home Health Status Pending medical clearance/testing               Per MD's Note,  The patient is doing well since surgery.  He reports adequate pain control with tylenol and not requiring any prn pain meds.  He denies any chest pain or SOB.  He is breathing well through new tracheostomy with humidified 8L O2.     The patient is not medically stable to discharge at this time. The SW will continue to follow-up with the patient to assist with discharge planning.      Jordan Aceves LMSW  Case Management [facility]  Ext: [phone]

## 2022-01-06 NOTE — SUBJECTIVE & OBJECTIVE
Interval History: LYRIC. TFs @ 10. No nausea or abdominal pain. Was OOB yesterday. Lainez and meghann removed.     Medications:  Continuous Infusions:   sodium chloride 0.9% 50 mL/hr at 01/06/22 0600    sodium chloride 0.9% Stopped (01/06/22 0532)     Scheduled Meds:   acetaminophen  650 mg Per G Tube Q6H    amLODIPine  10 mg Per G Tube Daily    ampicillin-sulbactim (UNASYN) IVPB  3 g Intravenous Q6H    aspirin  81 mg Per G Tube Daily    atorvastatin  20 mg Per G Tube Daily    bacitracin zinc   Topical (Top) BID    docusate  100 mg Per G Tube BID    enoxaparin  40 mg Subcutaneous Daily    fluticasone furoate  1 puff Inhalation Daily    folic acid  1 mg Per G Tube Daily    LIDOcaine (PF) 10 mg/ml (1%)  1 mL Other Once    metoprolol tartrate  100 mg Per G Tube BID    thiamine  100 mg Per G Tube Daily    tiotropium bromide  2 puff Inhalation Daily     PRN Meds:albuterol-ipratropium, HYDROmorphone, lorazepam, magnesium oxide, magnesium oxide, ondansetron, oxyCODONE, oxyCODONE, potassium bicarbonate, potassium bicarbonate, potassium bicarbonate, potassium, sodium phosphates, potassium, sodium phosphates, potassium, sodium phosphates, sodium chloride 0.9%     Review of patient's allergies indicates:  No Known Allergies  Objective:     Vital Signs (24h Range):  Temp:  [97.5 °F (36.4 °C)-98.2 °F (36.8 °C)] 97.8 °F (36.6 °C)  Pulse:  [65-98] 77  Resp:  [9-35] 18  SpO2:  [87 %-99 %] 92 %  BP: (106-173)/(59-70) 145/65  Arterial Line BP: (141-156)/(52-61) 156/54       Lines/Drains/Airways     Drain                 Gastrostomy/Enterostomy 01/04/22 Percutaneous endoscopic gastrostomy (PEG) LUQ 2 days         Closed/Suction Drain 01/04/22 1244 Right;Anterior Thigh Bulb 15 Fr. 1 day         Closed/Suction Drain 01/04/22 1246 Right;Anterior Thigh Bulb 15 Fr. 1 day         Closed/Suction Drain 01/04/22 1450 Left Neck Bulb 15 Fr. 1 day         Closed/Suction Drain 01/04/22 1450 Right Neck Bulb 15 Fr. 1 day          Closed/Suction Drain 01/04/22 1450 Right Neck Bulb 15 Fr. 1 day          Airway                 Surgical Airway 01/04/22 1546 Shiley Cuffed 1 day          Peripheral Intravenous Line                 Peripheral IV - Single Lumen 01/03/22 1307 20 G Anterior;Proximal;Right Forearm 2 days         Peripheral IV - Single Lumen 01/04/22 0516 18 G Right Antecubital 2 days                Physical Exam     NAD, alert, and awake   NC/AT, EOMI, clear sclera  Normal external nose   MMM, rosemary-tongue ALTFF in place with good color that approximates the thigh, good tugar and warm, strong arterial doppler signal, and good venous signal from implantable doppler   Neck soft, advancement flaps in place without duskiness, staples intact, JPx2 on right with s/s output, RACHEL x 1 on left with s/s output   6-0 cuffed trach sutured in place, cuff down   Normal work of breathing, on trach collar   G tube in place    Significant Labs:  CBC:   Recent Labs   Lab 01/06/22  0319   WBC 10.26   RBC 3.22*   HGB 9.5*   HCT 30.5*      MCV 95   MCH 29.5   MCHC 31.1*     CMP:   Recent Labs   Lab 01/04/22  1636 01/05/22  0311 01/06/22  0319   *   < > 104   CALCIUM 7.9*   < > 8.2*   ALBUMIN 2.7*  --   --    PROT 4.5*  --   --    *   < > 139   K 4.5   < > 3.9   CO2 18*   < > 28      < > 103   BUN 6*   < > 8   CREATININE 0.6   < > 0.6   ALKPHOS 43*  --   --    ALT 9*  --   --    AST 17  --   --    BILITOT 1.6*  --   --     < > = values in this interval not displayed.       Significant Diagnostics:  I have reviewed all pertinent imaging results/findings within the past 24 hours.

## 2022-01-07 LAB
ANION GAP SERPL CALC-SCNC: 6 MMOL/L (ref 8–16)
BASOPHILS # BLD AUTO: 0.03 K/UL (ref 0–0.2)
BASOPHILS NFR BLD: 0.3 % (ref 0–1.9)
BUN SERPL-MCNC: 14 MG/DL (ref 8–23)
CALCIUM SERPL-MCNC: 8.1 MG/DL (ref 8.7–10.5)
CHLORIDE SERPL-SCNC: 103 MMOL/L (ref 95–110)
CO2 SERPL-SCNC: 32 MMOL/L (ref 23–29)
CREAT SERPL-MCNC: 0.6 MG/DL (ref 0.5–1.4)
DIFFERENTIAL METHOD: ABNORMAL
EOSINOPHIL # BLD AUTO: 0.3 K/UL (ref 0–0.5)
EOSINOPHIL NFR BLD: 3.3 % (ref 0–8)
ERYTHROCYTE [DISTWIDTH] IN BLOOD BY AUTOMATED COUNT: 15 % (ref 11.5–14.5)
EST. GFR  (AFRICAN AMERICAN): >60 ML/MIN/1.73 M^2
EST. GFR  (NON AFRICAN AMERICAN): >60 ML/MIN/1.73 M^2
GLUCOSE SERPL-MCNC: 141 MG/DL (ref 70–110)
HCT VFR BLD AUTO: 29.2 % (ref 40–54)
HGB BLD-MCNC: 9.1 G/DL (ref 14–18)
IMM GRANULOCYTES # BLD AUTO: 0.05 K/UL (ref 0–0.04)
IMM GRANULOCYTES NFR BLD AUTO: 0.5 % (ref 0–0.5)
LYMPHOCYTES # BLD AUTO: 0.5 K/UL (ref 1–4.8)
LYMPHOCYTES NFR BLD: 5.6 % (ref 18–48)
MAGNESIUM SERPL-MCNC: 2.1 MG/DL (ref 1.6–2.6)
MCH RBC QN AUTO: 29.7 PG (ref 27–31)
MCHC RBC AUTO-ENTMCNC: 31.2 G/DL (ref 32–36)
MCV RBC AUTO: 95 FL (ref 82–98)
MONOCYTES # BLD AUTO: 0.7 K/UL (ref 0.3–1)
MONOCYTES NFR BLD: 7.6 % (ref 4–15)
NEUTROPHILS # BLD AUTO: 8 K/UL (ref 1.8–7.7)
NEUTROPHILS NFR BLD: 82.7 % (ref 38–73)
NRBC BLD-RTO: 0 /100 WBC
PHOSPHATE SERPL-MCNC: 3.2 MG/DL (ref 2.7–4.5)
PLATELET # BLD AUTO: 251 K/UL (ref 150–450)
PMV BLD AUTO: 8.6 FL (ref 9.2–12.9)
POTASSIUM SERPL-SCNC: 4 MMOL/L (ref 3.5–5.1)
RBC # BLD AUTO: 3.06 M/UL (ref 4.6–6.2)
SODIUM SERPL-SCNC: 141 MMOL/L (ref 136–145)
WBC # BLD AUTO: 9.63 K/UL (ref 3.9–12.7)

## 2022-01-07 PROCEDURE — 25000003 PHARM REV CODE 250: Performed by: OTOLARYNGOLOGY

## 2022-01-07 PROCEDURE — 25000003 PHARM REV CODE 250: Performed by: STUDENT IN AN ORGANIZED HEALTH CARE EDUCATION/TRAINING PROGRAM

## 2022-01-07 PROCEDURE — 99900026 HC AIRWAY MAINTENANCE (STAT)

## 2022-01-07 PROCEDURE — 99291 CRITICAL CARE FIRST HOUR: CPT | Mod: GC,,, | Performed by: STUDENT IN AN ORGANIZED HEALTH CARE EDUCATION/TRAINING PROGRAM

## 2022-01-07 PROCEDURE — 94761 N-INVAS EAR/PLS OXIMETRY MLT: CPT

## 2022-01-07 PROCEDURE — 27000221 HC OXYGEN, UP TO 24 HOURS

## 2022-01-07 PROCEDURE — 84100 ASSAY OF PHOSPHORUS: CPT | Performed by: STUDENT IN AN ORGANIZED HEALTH CARE EDUCATION/TRAINING PROGRAM

## 2022-01-07 PROCEDURE — 99900035 HC TECH TIME PER 15 MIN (STAT)

## 2022-01-07 PROCEDURE — 63600175 PHARM REV CODE 636 W HCPCS: Performed by: STUDENT IN AN ORGANIZED HEALTH CARE EDUCATION/TRAINING PROGRAM

## 2022-01-07 PROCEDURE — 85025 COMPLETE CBC W/AUTO DIFF WBC: CPT | Performed by: STUDENT IN AN ORGANIZED HEALTH CARE EDUCATION/TRAINING PROGRAM

## 2022-01-07 PROCEDURE — 25000003 PHARM REV CODE 250

## 2022-01-07 PROCEDURE — 97535 SELF CARE MNGMENT TRAINING: CPT

## 2022-01-07 PROCEDURE — 80048 BASIC METABOLIC PNL TOTAL CA: CPT | Performed by: STUDENT IN AN ORGANIZED HEALTH CARE EDUCATION/TRAINING PROGRAM

## 2022-01-07 PROCEDURE — 83735 ASSAY OF MAGNESIUM: CPT | Performed by: STUDENT IN AN ORGANIZED HEALTH CARE EDUCATION/TRAINING PROGRAM

## 2022-01-07 PROCEDURE — 25000242 PHARM REV CODE 250 ALT 637 W/ HCPCS: Performed by: STUDENT IN AN ORGANIZED HEALTH CARE EDUCATION/TRAINING PROGRAM

## 2022-01-07 PROCEDURE — 20000000 HC ICU ROOM

## 2022-01-07 PROCEDURE — 99291 PR CRITICAL CARE, E/M 30-74 MINUTES: ICD-10-PCS | Mod: GC,,, | Performed by: STUDENT IN AN ORGANIZED HEALTH CARE EDUCATION/TRAINING PROGRAM

## 2022-01-07 PROCEDURE — 92507 TX SP LANG VOICE COMM INDIV: CPT

## 2022-01-07 RX ORDER — HYDRALAZINE HYDROCHLORIDE 25 MG/1
25 TABLET, FILM COATED ORAL 3 TIMES DAILY
Status: DISCONTINUED | OUTPATIENT
Start: 2022-01-07 | End: 2022-01-11

## 2022-01-07 RX ORDER — LOSARTAN POTASSIUM 50 MG/1
100 TABLET ORAL DAILY
Status: DISCONTINUED | OUTPATIENT
Start: 2022-01-07 | End: 2022-01-11

## 2022-01-07 RX ADMIN — AMPICILLIN SODIUM AND SULBACTAM SODIUM 3 G: 2; 1 INJECTION, POWDER, FOR SOLUTION INTRAMUSCULAR; INTRAVENOUS at 05:01

## 2022-01-07 RX ADMIN — BACITRACIN 1 EACH: 500 OINTMENT TOPICAL at 08:01

## 2022-01-07 RX ADMIN — AMLODIPINE BESYLATE 10 MG: 10 TABLET ORAL at 08:01

## 2022-01-07 RX ADMIN — OXYCODONE HYDROCHLORIDE 10 MG: 5 SOLUTION ORAL at 07:01

## 2022-01-07 RX ADMIN — ENOXAPARIN SODIUM 40 MG: 40 INJECTION SUBCUTANEOUS at 05:01

## 2022-01-07 RX ADMIN — BACITRACIN: 500 OINTMENT TOPICAL at 09:01

## 2022-01-07 RX ADMIN — OXYCODONE HYDROCHLORIDE 10 MG: 5 SOLUTION ORAL at 03:01

## 2022-01-07 RX ADMIN — FOLIC ACID 1 MG: 1 TABLET ORAL at 08:01

## 2022-01-07 RX ADMIN — OXYCODONE HYDROCHLORIDE 10 MG: 5 SOLUTION ORAL at 08:01

## 2022-01-07 RX ADMIN — DOCUSATE SODIUM 100 MG: 50 LIQUID ORAL at 08:01

## 2022-01-07 RX ADMIN — ACETAMINOPHEN 650 MG: 325 TABLET ORAL at 05:01

## 2022-01-07 RX ADMIN — METOPROLOL TARTRATE 100 MG: 50 TABLET, FILM COATED ORAL at 08:01

## 2022-01-07 RX ADMIN — THIAMINE HCL TAB 100 MG 100 MG: 100 TAB at 08:01

## 2022-01-07 RX ADMIN — ASPIRIN 81 MG CHEWABLE TABLET 81 MG: 81 TABLET CHEWABLE at 08:01

## 2022-01-07 RX ADMIN — HYDRALAZINE HYDROCHLORIDE 25 MG: 25 TABLET, FILM COATED ORAL at 08:01

## 2022-01-07 RX ADMIN — ACETAMINOPHEN 650 MG: 325 TABLET ORAL at 11:01

## 2022-01-07 RX ADMIN — LOSARTAN POTASSIUM 100 MG: 50 TABLET, FILM COATED ORAL at 09:01

## 2022-01-07 RX ADMIN — ATORVASTATIN CALCIUM 20 MG: 20 TABLET, FILM COATED ORAL at 08:01

## 2022-01-07 RX ADMIN — AMPICILLIN SODIUM AND SULBACTAM SODIUM 3 G: 2; 1 INJECTION, POWDER, FOR SOLUTION INTRAMUSCULAR; INTRAVENOUS at 11:01

## 2022-01-07 RX ADMIN — HYDRALAZINE HYDROCHLORIDE 25 MG: 25 TABLET, FILM COATED ORAL at 03:01

## 2022-01-07 NOTE — SUBJECTIVE & OBJECTIVE
Interval History: NAEON. TFs at goal. No nausea or abdominal pain.      Medications:  Continuous Infusions:   sodium chloride 0.9% Stopped (01/06/22 0636)     Scheduled Meds:   acetaminophen  650 mg Per G Tube Q6H    amLODIPine  10 mg Per G Tube Daily    ampicillin-sulbactim (UNASYN) IVPB  3 g Intravenous Q6H    aspirin  81 mg Per G Tube Daily    atorvastatin  20 mg Per G Tube Daily    bacitracin zinc   Topical (Top) BID    docusate  100 mg Per G Tube BID    enoxaparin  40 mg Subcutaneous Daily    fluticasone furoate  1 puff Inhalation Daily    folic acid  1 mg Per G Tube Daily    LIDOcaine (PF) 10 mg/ml (1%)  1 mL Other Once    metoprolol tartrate  100 mg Per G Tube BID    thiamine  100 mg Per G Tube Daily    tiotropium bromide  2 puff Inhalation Daily     PRN Meds:albuterol-ipratropium, HYDROmorphone, lorazepam, ondansetron, oxyCODONE, oxyCODONE, sodium chloride 0.9%     Review of patient's allergies indicates:  No Known Allergies  Objective:     Vital Signs (24h Range):  Temp:  [97.6 °F (36.4 °C)-98 °F (36.7 °C)] 97.9 °F (36.6 °C)  Pulse:  [64-90] 87  Resp:  [8-24] 16  SpO2:  [90 %-100 %] 96 %  BP: (129-169)/(59-72) 151/67       Lines/Drains/Airways     Drain                 Gastrostomy/Enterostomy 01/04/22 Percutaneous endoscopic gastrostomy (PEG) LUQ 3 days         Closed/Suction Drain 01/04/22 1244 Right;Anterior Thigh Bulb 15 Fr. 2 days         Closed/Suction Drain 01/04/22 1246 Right;Anterior Thigh Bulb 15 Fr. 2 days         Closed/Suction Drain 01/04/22 1450 Left Neck Bulb 15 Fr. 2 days         Closed/Suction Drain 01/04/22 1450 Right Neck Bulb 15 Fr. 2 days         Closed/Suction Drain 01/04/22 1450 Right Neck Bulb 15 Fr. 2 days          Airway                 Surgical Airway 01/04/22 1546 Shiley Cuffed 2 days          Peripheral Intravenous Line                 Peripheral IV - Single Lumen 01/03/22 1307 20 G Anterior;Proximal;Right Forearm 3 days         Peripheral IV - Single Lumen  01/04/22 0516 18 G Right Antecubital 3 days                Physical Exam     NAD, alert, and awake   NC/AT, EOMI, clear sclera  Normal external nose   MMM, rosemary-tongue ALTFF in place with good color that approximates the thigh, good tugar and warm, strong arterial doppler signal, and good venous signal from implantable doppler   Neck soft, advancement flaps in place without duskiness, staples intact, JPx2 on right with s/s output, RACHEL x 1 on left with s/s output, right neck with swelling that is stable from yesterday PM  6-0 cuffed trach sutured in place, cuff down   Normal work of breathing, on trach collar   G tube in place    Significant Labs:  CBC:   Recent Labs   Lab 01/07/22  0349   WBC 9.63   RBC 3.06*   HGB 9.1*   HCT 29.2*      MCV 95   MCH 29.7   MCHC 31.2*     CMP:   Recent Labs   Lab 01/04/22  1636 01/05/22  0311 01/07/22  0349   *   < > 141*   CALCIUM 7.9*   < > 8.1*   ALBUMIN 2.7*  --   --    PROT 4.5*  --   --    *   < > 141   K 4.5   < > 4.0   CO2 18*   < > 32*      < > 103   BUN 6*   < > 14   CREATININE 0.6   < > 0.6   ALKPHOS 43*  --   --    ALT 9*  --   --    AST 17  --   --    BILITOT 1.6*  --   --     < > = values in this interval not displayed.       Significant Diagnostics:  I have reviewed all pertinent imaging results/findings within the past 24 hours.

## 2022-01-07 NOTE — PLAN OF CARE
Shift events: OOBTC all shift, walked in room, TF advanced    Vitals: Afebrile. HR NSR 70s, MAP>65, SpO2 > 88%    Neuro: Follows commands, uses written communication.    Cardiac: HR NSR 70s, MAP > 65    Respiratory: Trach Collar 10L, 40%, SpO2 95    Neurovascular: Palpable pulses in all 4 extremities. Q1H Doppler    GI: NPO, TF advanced to 40 mL/hr (goal 50)    : Voids per urinal, 575 mL/shift    Drains: RACHEL drains with minimal serosanguineous output, see flowsheets for details

## 2022-01-07 NOTE — ASSESSMENT & PLAN NOTE
Fareed Richard . is a 71 y.o. M with hx of squamous cell carcinoma, HTN, CAD s/p stents, carotid stenosis s/p carotid endarterectomy, PAD s/p b/l femoral atherectomies and other LE interventions who was admitted to SICU and will undergo glossectomy and face/neck reconstruction with ENT on 1/4/21.    Neuro/Psych:  - No sedation  - Pain controlled: tylenol, oxycodone/dilaudid prn   #alcohol use  - patient reports drinking 3-4 beers per night, last drink was 1/1  - Thiamine/Folate  - CIWA protocol    CV  #CAD s/p stents  #Carotid stenosis s/p endarterectomy  #PVS s/p multiple LE interventions including b/l femoral arthetectomies  - continue atorv, ASA and holding plavix    #HTN  - Home meds: continue Amlodipine 10, Metoprolol 200, start  Losartan 100 today, restart as needed Hydralazine 25 TID, Clonidine 0.1  - held BP meds after surgery, will slowly reintroduce     Pulm  #COPD  #Former smoker  - venotlin inhaler  - duonebs prn    #Tracheostomy 1/4  - fresh trach, well healing  - on trach collar 10L / 60%humidified O2, wean as tolerated    GI  - Nutrition consult  -F: mIVFs  -E: replete prn  -N: TF 50 ml/hr, goal    Renal  No active issues, remove flores    Heme/Onc  #Squamous Cell Carcinoma of tongue   #s/p total glossectomy, tracheostomy and free flap reconstruction  - q1h flap checks, arterial doppler without issue  - unasyn started 1/5 for 3 day course (end date 1/7)     Endo  - no active issues       PPx:   Feeding: NPO, tube feeds at goal  Analgesia/Sedation: ylenol, oxycodone/dilaudid prn / none  Thromboembolic prevention: lovenox  HOB >30: yes  Stress Ulcer ppx: no  Glucose control: Critical care goal 140-180 g/dl, ISS    Lines/Drains/Airway: trach, PIV, RACHEL x 5:  2 right neck, 1 left neck and 2 right leg, PEG      Dispo/Code Status/Palliative:   -- SICU / Full Code/ Step down orders in place

## 2022-01-07 NOTE — PT/OT/SLP PROGRESS
Occupational Therapy      Patient Name:  Fareed Richard Jr.   MRN:  [id number]    Pt with increased fatigue this AM and requested OT to return for OOB activity. OT unable to return.  Nsg and PT aware and to assist with progressive mobility skills this date. OT to check status at later date.     1/7/2022

## 2022-01-07 NOTE — SUBJECTIVE & OBJECTIVE
Interval History/Significant Events:   No acute events overnight. Did require increased amount of suctioning.  Tube feeds at goal of 50cc/hr.    Follow-up For: Procedure(s) (LRB):  GLOSSECTOMY (N/A)  TRACHEOTOMY (N/A)  EGD, WITH PEG TUBE INSERTION (N/A)  CREATION, FREE FLAP (Right)  DISSECTION, NECK (Bilateral)    Post-Operative Day: 3 Days Post-Op    Objective:     Vital Signs (Most Recent):  Temp: 97.9 °F (36.6 °C) (01/07/22 0300)  Pulse: 87 (01/07/22 0700)  Resp: 16 (01/07/22 0700)  BP: (!) 151/67 (01/07/22 0600)  SpO2: 96 % (01/07/22 0700) Vital Signs (24h Range):  Temp:  [97.6 °F (36.4 °C)-97.9 °F (36.6 °C)] 97.9 °F (36.6 °C)  Pulse:  [64-90] 87  Resp:  [8-24] 16  SpO2:  [90 %-100 %] 96 %  BP: (129-169)/(59-72) 151/67     Weight: 66.9 kg (147 lb 7.8 oz)  Body mass index is 22.43 kg/m².      Intake/Output Summary (Last 24 hours) at 1/7/2022 0716  Last data filed at 1/7/2022 0701  Gross per 24 hour   Intake 1119.51 ml   Output 1578 ml   Net -458.49 ml       Physical Exam  HENT:      Head:      Comments: Cervical facial flap soft, appropriate coloring, + triphasic artery  Pulses, Fresh trach CDI     Nose: No congestion.      Mouth/Throat:      Mouth: Mucous membranes are moist.   Eyes:      Extraocular Movements: Extraocular movements intact.      Pupils: Pupils are equal, round, and reactive to light.   Cardiovascular:      Rate and Rhythm: Normal rate and regular rhythm.      Pulses: Normal pulses.      Heart sounds: Normal heart sounds. No murmur heard.      Pulmonary:      Effort: No respiratory distress.      Breath sounds: Normal breath sounds. No wheezing.      Comments: Trach collar  Abdominal:      General: Abdomen is flat. There is no distension.      Palpations: Abdomen is soft.      Tenderness: There is no abdominal tenderness.      Comments: G tube in place   Musculoskeletal:         General: No swelling or tenderness.      Cervical back: Normal range of motion and neck supple. No rigidity.       Comments: Donor Site: R thigh incision CDI, no signs of hematoma. R DP pulses intact. Drains with minimal SS output   Skin:     General: Skin is warm and dry.   Neurological:      General: No focal deficit present.      Mental Status: He is alert and oriented to person, place, and time.         Vents:  Oxygen Concentration (%): 60 (01/07/22 0346)    Lines/Drains/Airways     Drain                 Gastrostomy/Enterostomy 01/04/22 Percutaneous endoscopic gastrostomy (PEG) LUQ 3 days         Closed/Suction Drain 01/04/22 1244 Right;Anterior Thigh Bulb 15 Fr. 2 days         Closed/Suction Drain 01/04/22 1246 Right;Anterior Thigh Bulb 15 Fr. 2 days         Closed/Suction Drain 01/04/22 1450 Left Neck Bulb 15 Fr. 2 days         Closed/Suction Drain 01/04/22 1450 Right Neck Bulb 15 Fr. 2 days         Closed/Suction Drain 01/04/22 1450 Right Neck Bulb 15 Fr. 2 days          Airway                 Surgical Airway 01/04/22 1546 Shiley Cuffed 2 days          Peripheral Intravenous Line                 Peripheral IV - Single Lumen 01/03/22 1307 20 G Anterior;Proximal;Right Forearm 3 days         Peripheral IV - Single Lumen 01/04/22 0516 18 G Right Antecubital 3 days                Significant Labs:    CBC/Anemia Profile:  Recent Labs   Lab 01/06/22  0319 01/07/22  0349   WBC 10.26 9.63   HGB 9.5* 9.1*   HCT 30.5* 29.2*    251   MCV 95 95   RDW 15.1* 15.0*        Chemistries:  Recent Labs   Lab 01/06/22  0319 01/07/22  0349    141   K 3.9 4.0    103   CO2 28 32*   BUN 8 14   CREATININE 0.6 0.6   CALCIUM 8.2* 8.1*   MG 2.0 2.1   PHOS 2.3* 3.2         Significant Imaging:  I have reviewed all pertinent imaging results/findings within the past 24 hours.

## 2022-01-07 NOTE — PROGRESS NOTES
[facility]  Otorhinolaryngology-Head & Neck Surgery  Progress Note    Subjective:     Post-Op Info:  Procedure(s) (LRB):  GLOSSECTOMY (N/A)  TRACHEOTOMY (N/A)  EGD, WITH PEG TUBE INSERTION (N/A)  CREATION, FREE FLAP (Right)  DISSECTION, NECK (Bilateral)   3 Days Post-Op  Hospital Day: 5     Interval History: NAEON. TFs at goal. No nausea or abdominal pain.      Medications:  Continuous Infusions:   sodium chloride 0.9% Stopped (01/06/22 0636)     Scheduled Meds:   acetaminophen  650 mg Per G Tube Q6H    amLODIPine  10 mg Per G Tube Daily    ampicillin-sulbactim (UNASYN) IVPB  3 g Intravenous Q6H    aspirin  81 mg Per G Tube Daily    atorvastatin  20 mg Per G Tube Daily    bacitracin zinc   Topical (Top) BID    docusate  100 mg Per G Tube BID    enoxaparin  40 mg Subcutaneous Daily    fluticasone furoate  1 puff Inhalation Daily    folic acid  1 mg Per G Tube Daily    LIDOcaine (PF) 10 mg/ml (1%)  1 mL Other Once    metoprolol tartrate  100 mg Per G Tube BID    thiamine  100 mg Per G Tube Daily    tiotropium bromide  2 puff Inhalation Daily     PRN Meds:albuterol-ipratropium, HYDROmorphone, lorazepam, ondansetron, oxyCODONE, oxyCODONE, sodium chloride 0.9%     Review of patient's allergies indicates:  No Known Allergies  Objective:     Vital Signs (24h Range):  Temp:  [97.6 °F (36.4 °C)-98 °F (36.7 °C)] 97.9 °F (36.6 °C)  Pulse:  [64-90] 87  Resp:  [8-24] 16  SpO2:  [90 %-100 %] 96 %  BP: (129-169)/(59-72) 151/67       Lines/Drains/Airways     Drain                 Gastrostomy/Enterostomy 01/04/22 Percutaneous endoscopic gastrostomy (PEG) LUQ 3 days         Closed/Suction Drain 01/04/22 1244 Right;Anterior Thigh Bulb 15 Fr. 2 days         Closed/Suction Drain 01/04/22 1246 Right;Anterior Thigh Bulb 15 Fr. 2 days         Closed/Suction Drain 01/04/22 1450 Left Neck Bulb 15 Fr. 2 days         Closed/Suction Drain 01/04/22 1450 Right Neck Bulb 15 Fr. 2 days         Closed/Suction  Drain 01/04/22 1450 Right Neck Bulb 15 Fr. 2 days          Airway                 Surgical Airway 01/04/22 1546 Shiley Cuffed 2 days          Peripheral Intravenous Line                 Peripheral IV - Single Lumen 01/03/22 1307 20 G Anterior;Proximal;Right Forearm 3 days         Peripheral IV - Single Lumen 01/04/22 0516 18 G Right Antecubital 3 days                Physical Exam     NAD, alert, and awake   NC/AT, EOMI, clear sclera  Normal external nose   MMM, rosemary-tongue ALTFF in place with good color that approximates the thigh, good tugar and warm, strong arterial doppler signal, and good venous signal from implantable doppler   Neck soft, advancement flaps in place without duskiness, staples intact, JPx2 on right with s/s output, RACHEL x 1 on left with s/s output, right neck with swelling that is stable from yesterday PM  6-0 cuffed trach sutured in place, cuff down   Normal work of breathing, on trach collar   G tube in place    Significant Labs:  CBC:   Recent Labs   Lab 01/07/22  0349   WBC 9.63   RBC 3.06*   HGB 9.1*   HCT 29.2*      MCV 95   MCH 29.7   MCHC 31.2*     CMP:   Recent Labs   Lab 01/04/22  1636 01/05/22  0311 01/07/22  0349   *   < > 141*   CALCIUM 7.9*   < > 8.1*   ALBUMIN 2.7*  --   --    PROT 4.5*  --   --    *   < > 141   K 4.5   < > 4.0   CO2 18*   < > 32*      < > 103   BUN 6*   < > 14   CREATININE 0.6   < > 0.6   ALKPHOS 43*  --   --    ALT 9*  --   --    AST 17  --   --    BILITOT 1.6*  --   --     < > = values in this interval not displayed.       Significant Diagnostics:  I have reviewed all pertinent imaging results/findings within the past 24 hours.    Assessment/Plan:     * Squamous cell cancer of tongue  71M with [id number] SCC of the oral tongue s/p total glossectomy, bilateral neck dissections levels I-IV, tracheostomy, and ALTFF reconstruction with G tube placement by general surgery. Doing well.     ENT   -Transition to q2h flap checks: Record Doppler  "Pulses (artery and vein) ,Capillary Refill , Color, Turgor, temperature.   -Neurovascular Checks q2h of bilateral upper and lower extremities   -Keep head elevated and in neutral position   -Sign above bed that reads "No circumferential Neck Ties"   -Sign above bed that reads "No ties around tracheostomy"   -NO VASOPRESSORS unless cleared by ENT Head and Neck Surgery Attending   -FOR ANY FLAP ISSUES, PLEASE PHONE ENT RESIDENT ON CALL.  -Please label drains carefully and document accordingly  -Bacitracin to all incisions/staple lines  -SLP consult for new tracheostomy and total glossectomy     Neuro  -pain control    -scheduled tylenol    -oxycodone/dilaudid PRN   -CIWA protocol as precaution    -thiamine and folate     CV  -home medications restarted (amlodipine, metoprolol)    -metoprolol changed from succinate to tartrate. Have some room to titrate up if needed for BP control   -telemetry   -home atorvastatin  -on ASA. Patient reported not taking blood thinner pre-op. Will clarify if he was taking Plavix. Hold Plavix for now.       Resp  -fresh trach care   -trach collar   -will likely change to cuffless trach on Sunday vs Monday   -trach teaching with respiratory   -continuous pulse ox   -inhalers (fluticasone and tiotropium daily, duonebs PRN)    FENGI  -strict NPO, mIVFs, TFs at goal  -zofran PRN   -colace for bowel regimen     Renal  -CTM, no issues     Heme/ID   -Unasyn x 72 hours post-op     Endocrine   -no issues, monitor glucose     MSK   -PT/OT  -OOB     Ppx: L40, SCDs, NIKITA hose     Dispo: step down orders placed for PCU, q2h flap checks     Discussed ENT plan with SICU resident           Annmarie Oliveira MD  Otorhinolaryngology-Head & Neck Surgery  [facility]  "

## 2022-01-07 NOTE — PLAN OF CARE
Problem: Adult Inpatient Plan of Care  Goal: Plan of Care Review  Outcome: Ongoing, Progressing  Goal: Patient-Specific Goal (Individualized)  Outcome: Ongoing, Progressing  Goal: Absence of Hospital-Acquired Illness or Injury  Outcome: Ongoing, Progressing  Goal: Optimal Comfort and Wellbeing  Outcome: Ongoing, Progressing  Goal: Readiness for Transition of Care  Outcome: Ongoing, Progressing     Problem: Infection  Goal: Absence of Infection Signs and Symptoms  Outcome: Ongoing, Progressing     Problem: Fall Injury Risk  Goal: Absence of Fall and Fall-Related Injury  Outcome: Ongoing, Progressing     Problem: Skin Injury Risk Increased  Goal: Skin Health and Integrity  Outcome: Ongoing, Progressing

## 2022-01-07 NOTE — PLAN OF CARE
Problem: SLP Goal  Goal: SLP Goal  Description: Speech Language Pathology Goals  Goals expected to be met by 1/12    1. Pt will tolerate PMSV for 5 minutes with >92% spO2 to increase phonation, respiration and swallowing aspects  2. Pt/family will don/doff PMSV x1 during session with min cues  3. Pt will vocalize with PMSV in place to increase verbal expression.         Outcome: Ongoing, Progressing     Good progress towards goals

## 2022-01-07 NOTE — NURSING
Advised pt that he needed to get up into the chair this morning.  Pt refused.  Explained the importance of being upright and the pt continues to refuse.  When ask if he would get up later in the day he nodded yes

## 2022-01-07 NOTE — PT/OT/SLP PROGRESS
Speech Language Pathology Treatment    Patient Name:  Fareed Richard Jr.   MRN:  [id number]  Admitting Diagnosis: Squamous cell cancer of tongue    Recommendations:     SLP discussed with pt spouse and RN a wear schedule for PMSV over the weekend 15-30 minutes as tolerated twice daily                 General Recommendations:  Dysphagia therapy  Diet recommendations:  NPO, Liquid Diet Level: NPO   Aspiration Precautions: Strict aspiration precautions   General Precautions: Standard, NPO,fall  Communication strategies:  none    Subjective     Pt awake and alert spouse at the bedside     Pain/Comfort:  · Pain Rating 1: 0/10  · Pain Rating Post-Intervention 1: 0/10    Respiratory Status: trach collar     Objective:     Has the patient been evaluated by SLP for swallowing?   Yes  Keep patient NPO? Yes     RN provided tracheal suction prior to trials     Passy Jaimie Speaking Valve  Trach size/type: Shiley 6.0 cuffed with trach cuff fully deflated   Able to phonate around trach: inconsistently   Vocal quality with digital finger occlusion: unable to attempt given duckbill shaped inner cannula    O2 sats at rest: 94% +  O2 sats with PMSV in place: unachanged  Vocal quality with valve in place: wet initially but then cleared over time and pt able to bring secretions to oral cavity for oral suction , speech ineligibility fair 50% intelligible in a known context   Back pressure upon removal: none appreciated   Minutes PMSV worn: 15 minutes  Education topics addressed: cleaning valve, one-way technology, anatomy of trach, precautions with cuffed trach, removal of valve prior to sleeping  Spouse demonstrated don and offing trach x3 with SLP supervision     Pt with improved speaking valve tolerance compared to earlier in the week. SLP discussed with pt spouse and RN a wear schedule for PMSV over the weekend 15-30 minutes as tolerated twice daily   Whiteboard current    Assessment:     Fareed Richard Jr. is a 71 y.o. male with an SLP  diagnosis of S/P Trach and One Way Speaking Valve Training.        Goals:   Multidisciplinary Problems     SLP Goals        Problem: SLP Goal    Goal Priority Disciplines Outcome   SLP Goal     SLP Ongoing, Progressing   Description: Speech Language Pathology Goals  Goals expected to be met by 1/12    1. Pt will tolerate PMSV for 5 minutes with >92% spO2 to increase phonation, respiration and swallowing aspects  2. Pt/family will don/doff PMSV x1 during session with min cues  3. Pt will vocalize with PMSV in place to increase verbal expression.                          Plan:     · Patient to be seen:  4 x/week   · Plan of Care expires:     · Plan of Care reviewed with:  patient,spouse   · SLP Follow-Up:  Yes       Discharge recommendations:  home with home health,outpatient speech therapy   Barriers to Discharge:  None    Time Tracking:     SLP Treatment Date:   01/07/22  Speech Start Time:  0852  Speech Stop Time:  0912     Speech Total Time (min):  20 min    Billable Minutes: Speech Therapy Individual 10 and Self Care/Home Management Training 10    01/07/2022

## 2022-01-07 NOTE — PROGRESS NOTES
Notified ENT of continued localized swelling to neck, appearing more ecchymotic. ENT at bedside, strong Doppler signal, flap with expected color and palpation, no concerns at this time. Will continue to monitor.

## 2022-01-07 NOTE — NURSING
"      SICU PLAN OF CARE NOTE    Dx: Squamous cell cancer of tongue    Shift Events:  Required more trach suctioning throughout this am.  TF now at max for most of shift.  Pt continues to refuse to get up to chair this am but agrees to get up later in the day.  Flap checks Q 1 hr with doppler and audible.  Plan to step down today    Goals of Care: MAP > 65    Neuro: Arouses to Voice, Follows Commands and Moves All Extremities, nodes appropriately to questions.     Vital Signs: BP (!) 162/72   Pulse 77   Temp 97.9 °F (36.6 °C) (Axillary)   Resp 11   Ht 5' 8" (1.727 m)   Wt 66.9 kg (147 lb 7.8 oz)   SpO2 98%   BMI 22.43 kg/m²     Respiratory: Trach collar with 10L/60% O2 sats @ 98    Diet: Tube Feeds @ 50cc/hr (goal)    Gtts: None    Urine Output: Voids Spontaneously 550cc/shift    Drains: RACHEL x 5:  2 right neck, 1 left neck and 2 right leg.  See flowsheets for outputs    Flap Checks:  Q 1 hr     Labs/Accuchecks: Daily labs    Skin: Skin is dry with old bruising noted.  Foam to sacrum.  No skin breakdown noted.  Pt able to assist in repositioning.  Waffle mattress to bed.           "

## 2022-01-07 NOTE — PROGRESS NOTES
[facility]  Critical Care - Surgery  Progress Note    Patient Name: Fareed Richard Jr.  MRN: [id number]  Admission Date: 1/3/2022  Hospital Length of Stay: 4 days  Code Status: Full Code  Attending Provider: Naeem Smalls MD  Primary Care Provider: Kodi Tubbs MD   Principal Problem: Squamous cell cancer of tongue    Subjective:     Hospital/ICU Course:  Fareed Richard Jr. is a 71 y.o. M with hx of squamous cell carcinoma, HTN, CAD s/p stents, carotid stenosis s/p carotid endarterectomy in 2018, PAD s/p b/l femoral atherectomies and other LE interventions s/p total glossectomy, tracheostomy and free flap reconstruction with ENT on 1/4.  The patient has been doing well since surgery, requiring limited pain control.      Interval History/Significant Events:   No acute events overnight. Did require increased amount of suctioning.  Tube feeds at goal of 50cc/hr.    Follow-up For: Procedure(s) (LRB):  GLOSSECTOMY (N/A)  TRACHEOTOMY (N/A)  EGD, WITH PEG TUBE INSERTION (N/A)  CREATION, FREE FLAP (Right)  DISSECTION, NECK (Bilateral)    Post-Operative Day: 3 Days Post-Op    Objective:     Vital Signs (Most Recent):  Temp: 97.9 °F (36.6 °C) (01/07/22 0300)  Pulse: 87 (01/07/22 0700)  Resp: 16 (01/07/22 0700)  BP: (!) 151/67 (01/07/22 0600)  SpO2: 96 % (01/07/22 0700) Vital Signs (24h Range):  Temp:  [97.6 °F (36.4 °C)-97.9 °F (36.6 °C)] 97.9 °F (36.6 °C)  Pulse:  [64-90] 87  Resp:  [8-24] 16  SpO2:  [90 %-100 %] 96 %  BP: (129-169)/(59-72) 151/67     Weight: 66.9 kg (147 lb 7.8 oz)  Body mass index is 22.43 kg/m².      Intake/Output Summary (Last 24 hours) at 1/7/2022 0716  Last data filed at 1/7/2022 0701  Gross per 24 hour   Intake 1119.51 ml   Output 1578 ml   Net -458.49 ml       Physical Exam  HENT:      Head:      Comments: Cervical facial flap soft, appropriate coloring, + triphasic artery  Pulses, Fresh trach CDI     Nose: No congestion.      Mouth/Throat:      Mouth: Mucous membranes  are moist.   Eyes:      Extraocular Movements: Extraocular movements intact.      Pupils: Pupils are equal, round, and reactive to light.   Cardiovascular:      Rate and Rhythm: Normal rate and regular rhythm.      Pulses: Normal pulses.      Heart sounds: Normal heart sounds. No murmur heard.      Pulmonary:      Effort: No respiratory distress.      Breath sounds: Normal breath sounds. No wheezing.      Comments: Trach collar  Abdominal:      General: Abdomen is flat. There is no distension.      Palpations: Abdomen is soft.      Tenderness: There is no abdominal tenderness.      Comments: G tube in place   Musculoskeletal:         General: No swelling or tenderness.      Cervical back: Normal range of motion and neck supple. No rigidity.      Comments: Donor Site: R thigh incision CDI, no signs of hematoma. R DP pulses intact. Drains with minimal SS output   Skin:     General: Skin is warm and dry.   Neurological:      General: No focal deficit present.      Mental Status: He is alert and oriented to person, place, and time.         Vents:  Oxygen Concentration (%): 60 (01/07/22 0346)    Lines/Drains/Airways     Drain                 Gastrostomy/Enterostomy 01/04/22 Percutaneous endoscopic gastrostomy (PEG) LUQ 3 days         Closed/Suction Drain 01/04/22 1244 Right;Anterior Thigh Bulb 15 Fr. 2 days         Closed/Suction Drain 01/04/22 1246 Right;Anterior Thigh Bulb 15 Fr. 2 days         Closed/Suction Drain 01/04/22 1450 Left Neck Bulb 15 Fr. 2 days         Closed/Suction Drain 01/04/22 1450 Right Neck Bulb 15 Fr. 2 days         Closed/Suction Drain 01/04/22 1450 Right Neck Bulb 15 Fr. 2 days          Airway                 Surgical Airway 01/04/22 1546 Shiley Cuffed 2 days          Peripheral Intravenous Line                 Peripheral IV - Single Lumen 01/03/22 1307 20 G Anterior;Proximal;Right Forearm 3 days         Peripheral IV - Single Lumen 01/04/22 0516 18 G Right Antecubital 3 days                 Significant Labs:    CBC/Anemia Profile:  Recent Labs   Lab 01/06/22  0319 01/07/22  0349   WBC 10.26 9.63   HGB 9.5* 9.1*   HCT 30.5* 29.2*    251   MCV 95 95   RDW 15.1* 15.0*        Chemistries:  Recent Labs   Lab 01/06/22  0319 01/07/22  0349    141   K 3.9 4.0    103   CO2 28 32*   BUN 8 14   CREATININE 0.6 0.6   CALCIUM 8.2* 8.1*   MG 2.0 2.1   PHOS 2.3* 3.2         Significant Imaging:  I have reviewed all pertinent imaging results/findings within the past 24 hours.    Assessment/Plan:     * Squamous cell cancer of tongue  Fareed Richard Jr. is a 71 y.o. M with hx of squamous cell carcinoma, HTN, CAD s/p stents, carotid stenosis s/p carotid endarterectomy, PAD s/p b/l femoral atherectomies and other LE interventions who was admitted to SICU and will undergo glossectomy and face/neck reconstruction with ENT on 1/4/21.    Neuro/Psych:  - No sedation  - Pain controlled: tylenol, oxycodone/dilaudid prn   #alcohol use  - patient reports drinking 3-4 beers per night, last drink was 1/1  - Thiamine/Folate  - CIWA protocol    CV  #CAD s/p stents  #Carotid stenosis s/p endarterectomy  #PVS s/p multiple LE interventions including b/l femoral arthetectomies  - continue atorv, ASA and holding plavix    #HTN  - Home meds: continue Amlodipine 10, Metoprolol 200, start  Losartan 100 today, restart as needed Hydralazine 25 TID, Clonidine 0.1  - held BP meds after surgery, will slowly reintroduce     Pulm  #COPD  #Former smoker  - venotlin inhaler  - duonebs prn    #Tracheostomy 1/4  - fresh trach, well healing  - on trach collar 10L / 60%humidified O2, wean as tolerated    GI  - Nutrition consult  -F: mIVFs  -E: replete prn  -N: TF 50 ml/hr, goal    Renal  No active issues, remove flores    Heme/Onc  #Squamous Cell Carcinoma of tongue   #s/p total glossectomy, tracheostomy and free flap reconstruction  - q1h flap checks, arterial doppler without issue  - unasyn started 1/5 for 3 day course (end date  1/7)     Endo  - no active issues       PPx:   Feeding: NPO, tube feeds at goal  Analgesia/Sedation: ylenol, oxycodone/dilaudid prn / none  Thromboembolic prevention: lovenox  HOB >30: yes  Stress Ulcer ppx: no  Glucose control: Critical care goal 140-180 g/dl, ISS    Lines/Drains/Airway: trach, PIV, RACHEL x 5:  2 right neck, 1 left neck and 2 right leg, PEG      Dispo/Code Status/Palliative:   -- SICU / Full Code/ Step down orders in place          Critical secondary to flap checks.     Critical care was time spent personally by me on the following activities: development of treatment plan with patient or surrogate and bedside caregivers, discussions with consultants, evaluation of patient's response to treatment, examination of patient, ordering and performing treatments and interventions, ordering and review of laboratory studies, ordering and review of radiographic studies, pulse oximetry, re-evaluation of patient's condition.  This critical care time did not overlap with that of any other provider or involve time for any procedures.     Victor Manuel Retana MD  Critical Care - Surgery  [facility]

## 2022-01-07 NOTE — ASSESSMENT & PLAN NOTE
"71M with [id number] SCC of the oral tongue s/p total glossectomy, bilateral neck dissections levels I-IV, tracheostomy, and ALTFF reconstruction with G tube placement by general surgery. Doing well.     ENT   -Transition to q2h flap checks: Record Doppler Pulses (artery and vein) ,Capillary Refill , Color, Turgor, temperature.   -Neurovascular Checks q2h of bilateral upper and lower extremities   -Keep head elevated and in neutral position   -Sign above bed that reads "No circumferential Neck Ties"   -Sign above bed that reads "No ties around tracheostomy"   -NO VASOPRESSORS unless cleared by ENT Head and Neck Surgery Attending   -FOR ANY FLAP ISSUES, PLEASE PHONE ENT RESIDENT ON CALL.  -Please label drains carefully and document accordingly  -Bacitracin to all incisions/staple lines  -SLP consult for new tracheostomy and total glossectomy     Neuro  -pain control    -scheduled tylenol    -oxycodone/dilaudid PRN   -CIWA protocol as precaution    -thiamine and folate     CV  -home medications restarted (amlodipine, metoprolol)    -metoprolol changed from succinate to tartrate. Have some room to titrate up if needed for BP control   -telemetry   -home atorvastatin  -on ASA. Patient reported not taking blood thinner pre-op. Will clarify if he was taking Plavix. Hold Plavix for now.       Resp  -fresh trach care   -trach collar   -will likely change to cuffless trach on Sunday vs Monday   -trach teaching with respiratory   -continuous pulse ox   -inhalers (fluticasone and tiotropium daily, duonebs PRN)    FENGI  -strict NPO, mIVFs, TFs at goal  -zofran PRN   -colace for bowel regimen     Renal  -CTM, no issues     Heme/ID   -Unasyn x 72 hours post-op     Endocrine   -no issues, monitor glucose     MSK   -PT/OT  -OOB     Ppx: L40, SCDs, NIKITA hose     Dispo: step down orders placed for PCU, q2h flap checks     Discussed ENT plan with SICU resident     "

## 2022-01-07 NOTE — PT/OT/SLP PROGRESS
Physical Therapy Missed Treatment Note      Patient Name:  Fareed Richard Jr.   MRN:  [id number]  Admitting Diagnosis:  Squamous cell cancer of tongue   Recent Surgery: Procedure(s) (LRB):  GLOSSECTOMY (N/A)  TRACHEOTOMY (N/A)  EGD, WITH PEG TUBE INSERTION (N/A)  CREATION, FREE FLAP (Right)  DISSECTION, NECK (Bilateral) 3 Days Post-Op  Admit Date: 1/3/2022  Length of Stay: 4 days    Patient not seen today due to Patient fatigue. PT with x2 attempts on this date in AM (~0800) and PM (~1515). Pt reporting fatigue and endorsed just wanting to rest on both attempts. Pt agreed to get OOB and sitting in chair with nsg tomorrow and throughout weekend. Fareed Richard Jr.'s plan of care and PT goals reviewed on this date and remain appropriate. Will follow-up for progressive mobility pending continued medical stability and patient participation.    Anuradha Herrera, PT, DPT  1/7/2022   Pager: [phone]

## 2022-01-08 LAB
ANION GAP SERPL CALC-SCNC: 5 MMOL/L (ref 8–16)
BASOPHILS # BLD AUTO: 0.03 K/UL (ref 0–0.2)
BASOPHILS NFR BLD: 0.3 % (ref 0–1.9)
BUN SERPL-MCNC: 23 MG/DL (ref 8–23)
CALCIUM SERPL-MCNC: 8.7 MG/DL (ref 8.7–10.5)
CHLORIDE SERPL-SCNC: 100 MMOL/L (ref 95–110)
CO2 SERPL-SCNC: 34 MMOL/L (ref 23–29)
CREAT SERPL-MCNC: 0.6 MG/DL (ref 0.5–1.4)
DIFFERENTIAL METHOD: ABNORMAL
EOSINOPHIL # BLD AUTO: 0.4 K/UL (ref 0–0.5)
EOSINOPHIL NFR BLD: 4 % (ref 0–8)
ERYTHROCYTE [DISTWIDTH] IN BLOOD BY AUTOMATED COUNT: 14.7 % (ref 11.5–14.5)
EST. GFR  (AFRICAN AMERICAN): >60 ML/MIN/1.73 M^2
EST. GFR  (NON AFRICAN AMERICAN): >60 ML/MIN/1.73 M^2
GLUCOSE SERPL-MCNC: 128 MG/DL (ref 70–110)
HCT VFR BLD AUTO: 27.7 % (ref 40–54)
HGB BLD-MCNC: 8.5 G/DL (ref 14–18)
IMM GRANULOCYTES # BLD AUTO: 0.06 K/UL (ref 0–0.04)
IMM GRANULOCYTES NFR BLD AUTO: 0.6 % (ref 0–0.5)
LYMPHOCYTES # BLD AUTO: 0.7 K/UL (ref 1–4.8)
LYMPHOCYTES NFR BLD: 7.3 % (ref 18–48)
MAGNESIUM SERPL-MCNC: 2.2 MG/DL (ref 1.6–2.6)
MCH RBC QN AUTO: 29.2 PG (ref 27–31)
MCHC RBC AUTO-ENTMCNC: 30.7 G/DL (ref 32–36)
MCV RBC AUTO: 95 FL (ref 82–98)
MONOCYTES # BLD AUTO: 0.9 K/UL (ref 0.3–1)
MONOCYTES NFR BLD: 9.5 % (ref 4–15)
NEUTROPHILS # BLD AUTO: 7.4 K/UL (ref 1.8–7.7)
NEUTROPHILS NFR BLD: 78.3 % (ref 38–73)
NRBC BLD-RTO: 0 /100 WBC
PHOSPHATE SERPL-MCNC: 4.2 MG/DL (ref 2.7–4.5)
PLATELET # BLD AUTO: 313 K/UL (ref 150–450)
PMV BLD AUTO: 8.8 FL (ref 9.2–12.9)
POTASSIUM SERPL-SCNC: 5 MMOL/L (ref 3.5–5.1)
RBC # BLD AUTO: 2.91 M/UL (ref 4.6–6.2)
SODIUM SERPL-SCNC: 139 MMOL/L (ref 136–145)
WBC # BLD AUTO: 9.4 K/UL (ref 3.9–12.7)

## 2022-01-08 PROCEDURE — 27000221 HC OXYGEN, UP TO 24 HOURS

## 2022-01-08 PROCEDURE — 99291 CRITICAL CARE FIRST HOUR: CPT | Mod: GC,,, | Performed by: STUDENT IN AN ORGANIZED HEALTH CARE EDUCATION/TRAINING PROGRAM

## 2022-01-08 PROCEDURE — 25000003 PHARM REV CODE 250: Performed by: STUDENT IN AN ORGANIZED HEALTH CARE EDUCATION/TRAINING PROGRAM

## 2022-01-08 PROCEDURE — 25000003 PHARM REV CODE 250

## 2022-01-08 PROCEDURE — 80048 BASIC METABOLIC PNL TOTAL CA: CPT | Performed by: STUDENT IN AN ORGANIZED HEALTH CARE EDUCATION/TRAINING PROGRAM

## 2022-01-08 PROCEDURE — 94761 N-INVAS EAR/PLS OXIMETRY MLT: CPT

## 2022-01-08 PROCEDURE — 25000242 PHARM REV CODE 250 ALT 637 W/ HCPCS: Performed by: STUDENT IN AN ORGANIZED HEALTH CARE EDUCATION/TRAINING PROGRAM

## 2022-01-08 PROCEDURE — 84100 ASSAY OF PHOSPHORUS: CPT | Performed by: STUDENT IN AN ORGANIZED HEALTH CARE EDUCATION/TRAINING PROGRAM

## 2022-01-08 PROCEDURE — 99291 PR CRITICAL CARE, E/M 30-74 MINUTES: ICD-10-PCS | Mod: GC,,, | Performed by: STUDENT IN AN ORGANIZED HEALTH CARE EDUCATION/TRAINING PROGRAM

## 2022-01-08 PROCEDURE — 83735 ASSAY OF MAGNESIUM: CPT | Performed by: STUDENT IN AN ORGANIZED HEALTH CARE EDUCATION/TRAINING PROGRAM

## 2022-01-08 PROCEDURE — 25000003 PHARM REV CODE 250: Performed by: OTOLARYNGOLOGY

## 2022-01-08 PROCEDURE — 20000000 HC ICU ROOM

## 2022-01-08 PROCEDURE — 63600175 PHARM REV CODE 636 W HCPCS: Performed by: STUDENT IN AN ORGANIZED HEALTH CARE EDUCATION/TRAINING PROGRAM

## 2022-01-08 PROCEDURE — 85025 COMPLETE CBC W/AUTO DIFF WBC: CPT | Performed by: STUDENT IN AN ORGANIZED HEALTH CARE EDUCATION/TRAINING PROGRAM

## 2022-01-08 RX ADMIN — LOSARTAN POTASSIUM 100 MG: 50 TABLET, FILM COATED ORAL at 08:01

## 2022-01-08 RX ADMIN — DOCUSATE SODIUM 100 MG: 50 LIQUID ORAL at 08:01

## 2022-01-08 RX ADMIN — ENOXAPARIN SODIUM 40 MG: 40 INJECTION SUBCUTANEOUS at 05:01

## 2022-01-08 RX ADMIN — ACETAMINOPHEN 650 MG: 325 TABLET ORAL at 12:01

## 2022-01-08 RX ADMIN — AMLODIPINE BESYLATE 10 MG: 10 TABLET ORAL at 08:01

## 2022-01-08 RX ADMIN — HYDRALAZINE HYDROCHLORIDE 25 MG: 25 TABLET, FILM COATED ORAL at 08:01

## 2022-01-08 RX ADMIN — METOPROLOL TARTRATE 100 MG: 50 TABLET, FILM COATED ORAL at 08:01

## 2022-01-08 RX ADMIN — BACITRACIN 1 EACH: 500 OINTMENT TOPICAL at 08:01

## 2022-01-08 RX ADMIN — ASPIRIN 81 MG CHEWABLE TABLET 81 MG: 81 TABLET CHEWABLE at 08:01

## 2022-01-08 RX ADMIN — OXYCODONE HYDROCHLORIDE 10 MG: 5 SOLUTION ORAL at 08:01

## 2022-01-08 RX ADMIN — OXYCODONE HYDROCHLORIDE 10 MG: 5 SOLUTION ORAL at 05:01

## 2022-01-08 RX ADMIN — HYDRALAZINE HYDROCHLORIDE 25 MG: 25 TABLET, FILM COATED ORAL at 02:01

## 2022-01-08 RX ADMIN — HYDROMORPHONE HYDROCHLORIDE 0.5 MG: 1 INJECTION, SOLUTION INTRAMUSCULAR; INTRAVENOUS; SUBCUTANEOUS at 04:01

## 2022-01-08 RX ADMIN — OXYCODONE HYDROCHLORIDE 5 MG: 5 SOLUTION ORAL at 02:01

## 2022-01-08 RX ADMIN — FOLIC ACID 1 MG: 1 TABLET ORAL at 08:01

## 2022-01-08 RX ADMIN — ACETAMINOPHEN 650 MG: 325 TABLET ORAL at 11:01

## 2022-01-08 RX ADMIN — ATORVASTATIN CALCIUM 20 MG: 20 TABLET, FILM COATED ORAL at 08:01

## 2022-01-08 RX ADMIN — ACETAMINOPHEN 650 MG: 325 TABLET ORAL at 05:01

## 2022-01-08 RX ADMIN — THIAMINE HCL TAB 100 MG 100 MG: 100 TAB at 08:01

## 2022-01-08 NOTE — PLAN OF CARE
"      SICU PLAN OF CARE NOTE    Dx: Squamous cell cancer of tongue    Shift Events: Transfer orders in place    Goals of Care: MAP > 65    Neuro: Follows Commands and Moves All Extremities; trach in place, LACY verbal A&O questions    Vital Signs: /64 (BP Location: Left arm, Patient Position: Lying)   Pulse 86   Temp 96.9 °F (36.1 °C) (Axillary)   Resp 12   Ht 5' 8" (1.727 m)   Wt 66.9 kg (147 lb 7.8 oz)   SpO2 (!) 93%   BMI 22.43 kg/m²     Respiratory: Trach Collar 8L/ 35%    Diet: NPO and Tube Feeds @ 50ml/hr    Gtts: Abx    Urine Output: Voids Spontaneously 525cc/shift    Drains: RACHEL drain x 5: 2 right leg, 2 right neck, and 1 left neck. Serosanguineous drainage, see flowsheet for output totals    Flap Checks Q2 hr     Labs: Daily    Skin: Bruised skin. No new skin breakdown noted. Patient turned and was provided weight shift assistance. Waffle mattress in place.       "

## 2022-01-08 NOTE — SUBJECTIVE & OBJECTIVE
Interval History/Significant Events:   No acute events overnight.  Added on losartan and Hydralazine for HTN yesterday   Informed this morning that UOP dropped to 1L over last 24 hours with bump in K+, BUN and Cr still WNL    Follow-up For: Procedure(s) (LRB):  GLOSSECTOMY (N/A)  TRACHEOTOMY (N/A)  EGD, WITH PEG TUBE INSERTION (N/A)  CREATION, FREE FLAP (Right)  DISSECTION, NECK (Bilateral)    Post-Operative Day: 4 Days Post-Op    Objective:     Vital Signs (Most Recent):  Temp: 97.5 °F (36.4 °C) (01/08/22 0330)  Pulse: 100 (01/08/22 0530)  Resp: 13 (01/08/22 0530)  BP: (!) 153/68 (01/08/22 0530)  SpO2: 99 % (01/08/22 0530) Vital Signs (24h Range):  Temp:  [96.9 °F (36.1 °C)-98.4 °F (36.9 °C)] 97.5 °F (36.4 °C)  Pulse:  [] 100  Resp:  [10-29] 13  SpO2:  [85 %-100 %] 99 %  BP: (120-180)/(59-88) 153/68     Weight: 66.9 kg (147 lb 7.8 oz)  Body mass index is 22.43 kg/m².      Intake/Output Summary (Last 24 hours) at 1/8/2022 0622  Last data filed at 1/8/2022 0500  Gross per 24 hour   Intake 1689.19 ml   Output 1160 ml   Net 529.19 ml       Physical Exam  HENT:      Head:      Comments: Cervical facial flap soft, appropriate coloring, + triphasic artery  Pulses, Fresh trach CDI     Nose: No congestion.      Mouth/Throat:      Mouth: Mucous membranes are moist.   Eyes:      Extraocular Movements: Extraocular movements intact.      Pupils: Pupils are equal, round, and reactive to light.   Cardiovascular:      Rate and Rhythm: Normal rate and regular rhythm.      Pulses: Normal pulses.      Heart sounds: Normal heart sounds. No murmur heard.      Pulmonary:      Effort: No respiratory distress.      Breath sounds: Normal breath sounds. No wheezing.      Comments: Trach collar  Abdominal:      General: Abdomen is flat. There is no distension.      Palpations: Abdomen is soft.      Tenderness: There is no abdominal tenderness.      Comments: G tube in place   Musculoskeletal:         General: No swelling or tenderness.       Cervical back: Normal range of motion and neck supple. No rigidity.      Comments: Donor Site: R thigh incision CDI, no signs of hematoma. R DP pulses intact. Drains with minimal SS output   Skin:     General: Skin is warm and dry.   Neurological:      General: No focal deficit present.      Mental Status: He is alert and oriented to person, place, and time.         Vents:  Oxygen Concentration (%): 35 (01/07/22 1900)    Lines/Drains/Airways     Drain                 Gastrostomy/Enterostomy 01/04/22 Percutaneous endoscopic gastrostomy (PEG) LUQ 4 days         Closed/Suction Drain 01/04/22 1244 Right;Anterior Thigh Bulb 15 Fr. 3 days         Closed/Suction Drain 01/04/22 1246 Right;Anterior Thigh Bulb 15 Fr. 3 days         Closed/Suction Drain 01/04/22 1450 Left Neck Bulb 15 Fr. 3 days         Closed/Suction Drain 01/04/22 1450 Right Neck Bulb 15 Fr. 3 days         Closed/Suction Drain 01/04/22 1450 Right Neck Bulb 15 Fr. 3 days          Airway                 Surgical Airway 01/04/22 1546 Shiley Cuffed 3 days          Peripheral Intravenous Line                 Peripheral IV - Single Lumen 01/04/22 0516 18 G Right Antecubital 4 days         Peripheral IV - Single Lumen 01/08/22 0416 22 G Anterior;Distal;Left Upper Arm <1 day                Significant Labs:    CBC/Anemia Profile:  Recent Labs   Lab 01/07/22  0349 01/08/22  0323   WBC 9.63 9.40   HGB 9.1* 8.5*   HCT 29.2* 27.7*    313   MCV 95 95   RDW 15.0* 14.7*        Chemistries:  Recent Labs   Lab 01/07/22  0349 01/08/22  0323    139   K 4.0 5.0    100   CO2 32* 34*   BUN 14 23   CREATININE 0.6 0.6   CALCIUM 8.1* 8.7   MG 2.1 2.2   PHOS 3.2 4.2         Significant Imaging:  I have reviewed all pertinent imaging results/findings within the past 24 hours.

## 2022-01-08 NOTE — PROGRESS NOTES
[facility]  Critical Care - Surgery  Progress Note    Patient Name: Fareed Richard Jr.  MRN: [id number]  Admission Date: 1/3/2022  Hospital Length of Stay: 5 days  Code Status: Full Code  Attending Provider: Naeem Smalls MD  Primary Care Provider: Kodi Tubbs MD   Principal Problem: Squamous cell cancer of tongue    Subjective:     Hospital/ICU Course:  Fareed Richard Jr. is a 71 y.o. M with hx of squamous cell carcinoma, HTN, CAD s/p stents, carotid stenosis s/p carotid endarterectomy in 2018, PAD s/p b/l femoral atherectomies and other LE interventions s/p total glossectomy, tracheostomy and free flap reconstruction with ENT on 1/4.  The patient has been doing well since surgery, requiring limited pain control.      Interval History/Significant Events:   No acute events overnight.  Added on losartan and Hydralazine for HTN yesterday   Informed this morning that UOP dropped to 1L over last 24 hours with bump in K+, BUN and Cr still WNL    Follow-up For: Procedure(s) (LRB):  GLOSSECTOMY (N/A)  TRACHEOTOMY (N/A)  EGD, WITH PEG TUBE INSERTION (N/A)  CREATION, FREE FLAP (Right)  DISSECTION, NECK (Bilateral)    Post-Operative Day: 4 Days Post-Op    Objective:     Vital Signs (Most Recent):  Temp: 97.5 °F (36.4 °C) (01/08/22 0330)  Pulse: 100 (01/08/22 0530)  Resp: 13 (01/08/22 0530)  BP: (!) 153/68 (01/08/22 0530)  SpO2: 99 % (01/08/22 0530) Vital Signs (24h Range):  Temp:  [96.9 °F (36.1 °C)-98.4 °F (36.9 °C)] 97.5 °F (36.4 °C)  Pulse:  [] 100  Resp:  [10-29] 13  SpO2:  [85 %-100 %] 99 %  BP: (120-180)/(59-88) 153/68     Weight: 66.9 kg (147 lb 7.8 oz)  Body mass index is 22.43 kg/m².      Intake/Output Summary (Last 24 hours) at 1/8/2022 0622  Last data filed at 1/8/2022 0500  Gross per 24 hour   Intake 1689.19 ml   Output 1160 ml   Net 529.19 ml       Physical Exam  HENT:      Head:      Comments: Cervical facial flap soft, appropriate coloring, + triphasic artery  Pulses,  Fresh trach CDI     Nose: No congestion.      Mouth/Throat:      Mouth: Mucous membranes are moist.   Eyes:      Extraocular Movements: Extraocular movements intact.      Pupils: Pupils are equal, round, and reactive to light.   Cardiovascular:      Rate and Rhythm: Normal rate and regular rhythm.      Pulses: Normal pulses.      Heart sounds: Normal heart sounds. No murmur heard.      Pulmonary:      Effort: No respiratory distress.      Breath sounds: Normal breath sounds. No wheezing.      Comments: Trach collar  Abdominal:      General: Abdomen is flat. There is no distension.      Palpations: Abdomen is soft.      Tenderness: There is no abdominal tenderness.      Comments: G tube in place   Musculoskeletal:         General: No swelling or tenderness.      Cervical back: Normal range of motion and neck supple. No rigidity.      Comments: Donor Site: R thigh incision CDI, no signs of hematoma. R DP pulses intact. Drains with minimal SS output   Skin:     General: Skin is warm and dry.   Neurological:      General: No focal deficit present.      Mental Status: He is alert and oriented to person, place, and time.         Vents:  Oxygen Concentration (%): 35 (01/07/22 1900)    Lines/Drains/Airways     Drain                 Gastrostomy/Enterostomy 01/04/22 Percutaneous endoscopic gastrostomy (PEG) LUQ 4 days         Closed/Suction Drain 01/04/22 1244 Right;Anterior Thigh Bulb 15 Fr. 3 days         Closed/Suction Drain 01/04/22 1246 Right;Anterior Thigh Bulb 15 Fr. 3 days         Closed/Suction Drain 01/04/22 1450 Left Neck Bulb 15 Fr. 3 days         Closed/Suction Drain 01/04/22 1450 Right Neck Bulb 15 Fr. 3 days         Closed/Suction Drain 01/04/22 1450 Right Neck Bulb 15 Fr. 3 days          Airway                 Surgical Airway 01/04/22 1546 Shiley Cuffed 3 days          Peripheral Intravenous Line                 Peripheral IV - Single Lumen 01/04/22 0516 18 G Right Antecubital 4 days         Peripheral IV -  Single Lumen 01/08/22 0416 22 G Anterior;Distal;Left Upper Arm <1 day                Significant Labs:    CBC/Anemia Profile:  Recent Labs   Lab 01/07/22  0349 01/08/22  0323   WBC 9.63 9.40   HGB 9.1* 8.5*   HCT 29.2* 27.7*    313   MCV 95 95   RDW 15.0* 14.7*        Chemistries:  Recent Labs   Lab 01/07/22  0349 01/08/22  0323    139   K 4.0 5.0    100   CO2 32* 34*   BUN 14 23   CREATININE 0.6 0.6   CALCIUM 8.1* 8.7   MG 2.1 2.2   PHOS 3.2 4.2         Significant Imaging:  I have reviewed all pertinent imaging results/findings within the past 24 hours.    Assessment/Plan:     * Squamous cell cancer of tongue  Fareed Richard Jr. is a 71 y.o. M with hx of squamous cell carcinoma, HTN, CAD s/p stents, carotid stenosis s/p carotid endarterectomy, PAD s/p b/l femoral atherectomies and other LE interventions who was admitted to SICU and will undergo glossectomy and face/neck reconstruction with ENT on 1/4/21.    Neuro/Psych:  - No sedation  - Pain controlled: scheduled tylenol, oxycodone/dilaudid prn   #alcohol use  - patient reports drinking 3-4 beers per night, last drink was 1/1  - Thiamine/Folate  - [place] protocol    CV  #CAD s/p stents  #Carotid stenosis s/p endarterectomy  #PVS s/p multiple LE interventions including b/l femoral arthetectomies  - continue atorv, ASA and holding plavix    #HTN  - Home meds: continue Amlodipine 10, Metoprolol 200, Losartan 100, Hydralazine 25 TID     Pulm  #COPD  #Former smoker  - venotlin inhaler  - duonebs prn    #Tracheostomy 1/4  - fresh trach, well healing  - on trach collar 10L / 60%humidified O2, wean as tolerated    GI  - Nutrition consult  -F: mIVFs off, will start 250 free water bolus via PEG TID  -E: replete prn  -N: TF 50 ml/hr, goal  - bowel regimen    Renal  No active issues  - BUN/Cr 23/0.6    Heme/Onc  #Squamous Cell Carcinoma of tongue   #s/p total glossectomy, tracheostomy and free flap reconstruction  - q2h flap checks, arterial doppler without  issue     Endo  - no active issues       PPx:   Feeding: NPO, tube feeds at goal  Analgesia/Sedation: tylenol, oxycodone/dilaudid prn / none  Thromboembolic prevention: lovenox  HOB >30: yes  Stress Ulcer ppx: no  Glucose control: Critical care goal 140-180 g/dl, ISS    Lines/Drains/Airway: trach, PIV, RACHEL x 5:  2 right neck, 1 left neck and 2 right leg, PEG      Dispo/Code Status/Palliative:   -- SICU / Full Code/ Step down orders in place        Critical secondary to Patient has a condition that poses threat to life and bodily function: SSC of the tongue      Critical care was time spent personally by me on the following activities: development of treatment plan with patient or surrogate and bedside caregivers, discussions with consultants, evaluation of patient's response to treatment, examination of patient, ordering and performing treatments and interventions, ordering and review of laboratory studies, ordering and review of radiographic studies, pulse oximetry, re-evaluation of patient's condition.  This critical care time did not overlap with that of any other provider or involve time for any procedures.     Victor Manuel Retana MD  Critical Care - Surgery  [facility]

## 2022-01-08 NOTE — PLAN OF CARE
"      SICU PLAN OF CARE NOTE    Dx: Squamous cell cancer of tongue    Shift Events: OOBTC. Transfer orders in place.     Goals of Care: MAP >65    Neuro: AAO x4, Follows Commands and Moves All Extremities    Vital Signs: BP (!) 166/74 (BP Location: Left arm, Patient Position: Lying)   Pulse 83   Temp 97.9 °F (36.6 °C) (Axillary)   Resp 11   Ht 5' 8" (1.727 m)   Wt 66.9 kg (147 lb 7.8 oz)   SpO2 96%   BMI 22.43 kg/m²     Respiratory: Trach  Collar 8L/35%    Diet: NPO and Tube Feeds @ 50 ml/hr      Urine Output: Voids Spontaneously 750 cc/shift    Drains: RACHEL drain x 5: 2 right leg, 2 right neck, and 1 left neck. Serosanguineous drainage, see flow sheet for output totals.    Flap Checks Q2 hr     Labs: Daily    Skin: CHG bath given. Bruised skin with skin tear to right forearm. Patient provided weight shift assistance q2hrs. Waffle mattress in place.        "

## 2022-01-08 NOTE — SUBJECTIVE & OBJECTIVE
Interval History: Doing well. Lymphedema of the right neck. Flap checks appropriate. Awaiting PCU bed    Medications:  Continuous Infusions:   sodium chloride 0.9% Stopped (01/06/22 0636)     Scheduled Meds:   acetaminophen  650 mg Per G Tube Q6H    amLODIPine  10 mg Per G Tube Daily    aspirin  81 mg Per G Tube Daily    atorvastatin  20 mg Per G Tube Daily    bacitracin zinc   Topical (Top) BID    docusate  100 mg Per G Tube BID    enoxaparin  40 mg Subcutaneous Daily    folic acid  1 mg Per G Tube Daily    hydrALAZINE  25 mg Per G Tube TID    LIDOcaine (PF) 10 mg/ml (1%)  1 mL Other Once    losartan  100 mg Per G Tube Daily    metoprolol tartrate  100 mg Per G Tube BID    thiamine  100 mg Per G Tube Daily     PRN Meds:albuterol-ipratropium, HYDROmorphone, lorazepam, ondansetron, oxyCODONE, oxyCODONE, sodium chloride 0.9%     Review of patient's allergies indicates:  No Known Allergies  Objective:     Vital Signs (24h Range):  Temp:  [96.9 °F (36.1 °C)-97.6 °F (36.4 °C)] 97.3 °F (36.3 °C)  Pulse:  [] 79  Resp:  [10-29] 12  SpO2:  [85 %-99 %] 94 %  BP: (120-180)/(60-88) 150/65     Date 01/08/22 0700 - 01/09/22 0659   Shift 3738-6887 5518-5050 0458-1336 24 Hour Total   INTAKE   NG/   535   Shift Total(mL/kg) 535(8)   535(8)   OUTPUT   Urine(mL/kg/hr) 500   500   Drains 76   76   Shift Total(mL/kg) 576(8.6)   576(8.6)   Weight (kg) 66.9 66.9 66.9 66.9     Lines/Drains/Airways     Drain                 Gastrostomy/Enterostomy 01/04/22 Percutaneous endoscopic gastrostomy (PEG) LUQ 4 days         Closed/Suction Drain 01/04/22 1244 Right;Anterior Thigh Bulb 15 Fr. 3 days         Closed/Suction Drain 01/04/22 1246 Right;Anterior Thigh Bulb 15 Fr. 3 days         Closed/Suction Drain 01/04/22 1450 Left Neck Bulb 15 Fr. 3 days         Closed/Suction Drain 01/04/22 1450 Right Neck Bulb 15 Fr. 3 days         Closed/Suction Drain 01/04/22 1450 Right Neck Bulb 15 Fr. 3 days          Airway                  Surgical Airway 01/04/22 1546 Shiley Cuffed 3 days          Peripheral Intravenous Line                 Peripheral IV - Single Lumen 01/04/22 0516 18 G Right Antecubital 4 days         Peripheral IV - Single Lumen 01/08/22 0416 22 G Anterior;Distal;Left Upper Arm <1 day                Physical Exam  NAD, alert, and awake   NC/AT, EOMI, clear sclera  Normal external nose   MMM, rosemary-tongue ALTFF in place with good color that approximates the thigh, good tugar and warm, strong arterial doppler signal, and good venous signal from implantable doppler   Neck soft, advancement flaps in place without duskiness, staples intact, JPx2 on right with s/s output, RACHEL x 1 on left with s/s output, right neck with swelling that is stable  6-0 cuffed trach sutured in place, cuff down   Normal work of breathing, on trach collar   G tube in place    Significant Labs:  CBC:   Recent Labs   Lab 01/08/22  0323   WBC 9.40   RBC 2.91*   HGB 8.5*   HCT 27.7*      MCV 95   MCH 29.2   MCHC 30.7*     CMP:   Recent Labs   Lab 01/04/22  1636 01/05/22  0311 01/08/22  0323   *   < > 128*   CALCIUM 7.9*   < > 8.7   ALBUMIN 2.7*  --   --    PROT 4.5*  --   --    *   < > 139   K 4.5   < > 5.0   CO2 18*   < > 34*      < > 100   BUN 6*   < > 23   CREATININE 0.6   < > 0.6   ALKPHOS 43*  --   --    ALT 9*  --   --    AST 17  --   --    BILITOT 1.6*  --   --     < > = values in this interval not displayed.       Significant Diagnostics:  I have reviewed and interpreted all pertinent imaging results/findings within the past 24 hours.

## 2022-01-08 NOTE — ASSESSMENT & PLAN NOTE
"71M with [id number] SCC of the oral tongue s/p total glossectomy, bilateral neck dissections levels I-IV, tracheostomy, and ALTFF reconstruction with G tube placement by general surgery. Doing well.     ENT   -Transition to q2h flap checks: Record Doppler Pulses (artery and vein) ,Capillary Refill , Color, Turgor, temperature.   -Neurovascular Checks q2h of bilateral upper and lower extremities   -Keep head elevated and in neutral position   -Sign above bed that reads "No circumferential Neck Ties"   -Sign above bed that reads "No ties around tracheostomy"   -NO VASOPRESSORS unless cleared by ENT Head and Neck Surgery Attending   -FOR ANY FLAP ISSUES, PLEASE PHONE ENT RESIDENT ON CALL.  -Please label drains carefully and document accordingly  -Bacitracin to all incisions/staple lines  -SLP consult for new tracheostomy and total glossectomy     Neuro  -pain control    -scheduled tylenol    -oxycodone/dilaudid PRN   -CIWA protocol as precaution    -thiamine and folate     CV  -home medications restarted (amlodipine, metoprolol)    -metoprolol changed from succinate to tartrate. Have some room to titrate up if needed for BP control   -telemetry   -home atorvastatin  -on ASA. Patient reported not taking blood thinner pre-op. Will clarify if he was taking Plavix. Hold Plavix for now.       Resp  -fresh trach care   -trach collar   -will likely change to cuffless trach on Sunday vs Monday   -trach teaching with respiratory   -continuous pulse ox   -inhalers (fluticasone and tiotropium daily, duonebs PRN)    FENGI  -strict NPO, mIVFs, TFs at goal  -zofran PRN   -colace for bowel regimen     Renal  -CTM, no issues     Heme/ID   -Unasyn x 72 hours post-op     Endocrine   -no issues, monitor glucose     MSK   -PT/OT  -OOB     Ppx: L40, SCDs, NIKITA hose     Dispo: step down orders placed for PCU, q2h flap checks     Discussed ENT plan with SICU resident     "

## 2022-01-08 NOTE — ASSESSMENT & PLAN NOTE
Fareed Richard . is a 71 y.o. M with hx of squamous cell carcinoma, HTN, CAD s/p stents, carotid stenosis s/p carotid endarterectomy, PAD s/p b/l femoral atherectomies and other LE interventions who was admitted to SICU and will undergo glossectomy and face/neck reconstruction with ENT on 1/4/21.    Neuro/Psych:  - No sedation  - Pain controlled: scheduled tylenol, oxycodone/dilaudid prn   #alcohol use  - patient reports drinking 3-4 beers per night, last drink was 1/1  - Thiamine/Folate  - CIWA protocol    CV  #CAD s/p stents  #Carotid stenosis s/p endarterectomy  #PVS s/p multiple LE interventions including b/l femoral arthetectomies  - continue atorv, ASA and holding plavix    #HTN  - Home meds: continue Amlodipine 10, Metoprolol 200, Losartan 100, Hydralazine 25 TID     Pulm  #COPD  #Former smoker  - venotlin inhaler  - duonebs prn    #Tracheostomy 1/4  - fresh trach, well healing  - on trach collar 10L / 60%humidified O2, wean as tolerated    GI  - Nutrition consult  -F: mIVFs off, will start 250 free water bolus via PEG TID  -E: replete prn  -N: TF 50 ml/hr, goal  - bowel regimen    Renal  No active issues  - BUN/Cr 23/0.6    Heme/Onc  #Squamous Cell Carcinoma of tongue   #s/p total glossectomy, tracheostomy and free flap reconstruction  - q2h flap checks, arterial doppler without issue     Endo  - no active issues       PPx:   Feeding: NPO, tube feeds at goal  Analgesia/Sedation: tylenol, oxycodone/dilaudid prn / none  Thromboembolic prevention: lovenox  HOB >30: yes  Stress Ulcer ppx: no  Glucose control: Critical care goal 140-180 g/dl, ISS    Lines/Drains/Airway: trach, PIV, RACHEL x 5:  2 right neck, 1 left neck and 2 right leg, PEG      Dispo/Code Status/Palliative:   -- SICU / Full Code/ Step down orders in place

## 2022-01-08 NOTE — NURSING
"      SICU PLAN OF CARE NOTE    Dx: Squamous cell cancer of tongue     Shift Events: No acute changes.  Up to chair this am.  Pt able to successfully get secretions up for suction.     Goals of Care: MAP > 65    Neuro: AAO x4    Vital Signs: BP (!) 153/68   Pulse 100   Temp 97.5 °F (36.4 °C) (Axillary)   Resp 13   Ht 5' 8" (1.727 m)   Wt 66.9 kg (147 lb 7.8 oz)   SpO2 99%   BMI 22.43 kg/m²     Respiratory: Trach collar 10L/40%    Diet: NPO and Tube Feeds 50cc/hr (goal)    Gtts: None    Urine Output: Voids Spontaneously 500 cc/shift    Drains: RACHEL drains x 5:  2 right neck, 2 right leg, 1 left neck    Flap checks Q 2 hrs    Labs/Accuchecks: Daily Labs.    Skin: Skin very thin.  Minimal tape used if possible.  Incisions to neck and right leg intact.  Foam to sacrum.  Up in chair this am.  Waffle to bed.         "

## 2022-01-09 LAB
ANION GAP SERPL CALC-SCNC: 6 MMOL/L (ref 8–16)
BASOPHILS # BLD AUTO: 0.03 K/UL (ref 0–0.2)
BASOPHILS NFR BLD: 0.2 % (ref 0–1.9)
BUN SERPL-MCNC: 23 MG/DL (ref 8–23)
CALCIUM SERPL-MCNC: 9.6 MG/DL (ref 8.7–10.5)
CHLORIDE SERPL-SCNC: 98 MMOL/L (ref 95–110)
CO2 SERPL-SCNC: 31 MMOL/L (ref 23–29)
CREAT SERPL-MCNC: 0.6 MG/DL (ref 0.5–1.4)
DIFFERENTIAL METHOD: ABNORMAL
EOSINOPHIL # BLD AUTO: 0.6 K/UL (ref 0–0.5)
EOSINOPHIL NFR BLD: 4.6 % (ref 0–8)
ERYTHROCYTE [DISTWIDTH] IN BLOOD BY AUTOMATED COUNT: 14.6 % (ref 11.5–14.5)
EST. GFR  (AFRICAN AMERICAN): >60 ML/MIN/1.73 M^2
EST. GFR  (NON AFRICAN AMERICAN): >60 ML/MIN/1.73 M^2
GLUCOSE SERPL-MCNC: 130 MG/DL (ref 70–110)
HCT VFR BLD AUTO: 29.4 % (ref 40–54)
HGB BLD-MCNC: 8.9 G/DL (ref 14–18)
IMM GRANULOCYTES # BLD AUTO: 0.07 K/UL (ref 0–0.04)
IMM GRANULOCYTES NFR BLD AUTO: 0.6 % (ref 0–0.5)
LYMPHOCYTES # BLD AUTO: 0.5 K/UL (ref 1–4.8)
LYMPHOCYTES NFR BLD: 4.2 % (ref 18–48)
MAGNESIUM SERPL-MCNC: 2.1 MG/DL (ref 1.6–2.6)
MCH RBC QN AUTO: 29.7 PG (ref 27–31)
MCHC RBC AUTO-ENTMCNC: 30.3 G/DL (ref 32–36)
MCV RBC AUTO: 98 FL (ref 82–98)
MONOCYTES # BLD AUTO: 1 K/UL (ref 0.3–1)
MONOCYTES NFR BLD: 8.6 % (ref 4–15)
NEUTROPHILS # BLD AUTO: 9.9 K/UL (ref 1.8–7.7)
NEUTROPHILS NFR BLD: 81.8 % (ref 38–73)
NRBC BLD-RTO: 0 /100 WBC
PHOSPHATE SERPL-MCNC: 4.7 MG/DL (ref 2.7–4.5)
PLATELET # BLD AUTO: 325 K/UL (ref 150–450)
PMV BLD AUTO: 9.2 FL (ref 9.2–12.9)
POTASSIUM SERPL-SCNC: 4.3 MMOL/L (ref 3.5–5.1)
RBC # BLD AUTO: 3 M/UL (ref 4.6–6.2)
SODIUM SERPL-SCNC: 135 MMOL/L (ref 136–145)
WBC # BLD AUTO: 12.05 K/UL (ref 3.9–12.7)

## 2022-01-09 PROCEDURE — 83735 ASSAY OF MAGNESIUM: CPT | Performed by: STUDENT IN AN ORGANIZED HEALTH CARE EDUCATION/TRAINING PROGRAM

## 2022-01-09 PROCEDURE — 25000003 PHARM REV CODE 250: Performed by: STUDENT IN AN ORGANIZED HEALTH CARE EDUCATION/TRAINING PROGRAM

## 2022-01-09 PROCEDURE — 99900026 HC AIRWAY MAINTENANCE (STAT)

## 2022-01-09 PROCEDURE — 99233 SBSQ HOSP IP/OBS HIGH 50: CPT | Mod: GC,,, | Performed by: STUDENT IN AN ORGANIZED HEALTH CARE EDUCATION/TRAINING PROGRAM

## 2022-01-09 PROCEDURE — 25000242 PHARM REV CODE 250 ALT 637 W/ HCPCS: Performed by: STUDENT IN AN ORGANIZED HEALTH CARE EDUCATION/TRAINING PROGRAM

## 2022-01-09 PROCEDURE — 99900035 HC TECH TIME PER 15 MIN (STAT)

## 2022-01-09 PROCEDURE — 25000003 PHARM REV CODE 250: Performed by: OTOLARYNGOLOGY

## 2022-01-09 PROCEDURE — 84100 ASSAY OF PHOSPHORUS: CPT | Performed by: STUDENT IN AN ORGANIZED HEALTH CARE EDUCATION/TRAINING PROGRAM

## 2022-01-09 PROCEDURE — 85025 COMPLETE CBC W/AUTO DIFF WBC: CPT | Performed by: STUDENT IN AN ORGANIZED HEALTH CARE EDUCATION/TRAINING PROGRAM

## 2022-01-09 PROCEDURE — 25000003 PHARM REV CODE 250

## 2022-01-09 PROCEDURE — 80048 BASIC METABOLIC PNL TOTAL CA: CPT | Performed by: STUDENT IN AN ORGANIZED HEALTH CARE EDUCATION/TRAINING PROGRAM

## 2022-01-09 PROCEDURE — 97110 THERAPEUTIC EXERCISES: CPT

## 2022-01-09 PROCEDURE — 94760 N-INVAS EAR/PLS OXIMETRY 1: CPT

## 2022-01-09 PROCEDURE — 63600175 PHARM REV CODE 636 W HCPCS: Performed by: STUDENT IN AN ORGANIZED HEALTH CARE EDUCATION/TRAINING PROGRAM

## 2022-01-09 PROCEDURE — 20600001 HC STEP DOWN PRIVATE ROOM

## 2022-01-09 PROCEDURE — 97530 THERAPEUTIC ACTIVITIES: CPT

## 2022-01-09 PROCEDURE — 27000221 HC OXYGEN, UP TO 24 HOURS

## 2022-01-09 PROCEDURE — 99233 PR SUBSEQUENT HOSPITAL CARE,LEVL III: ICD-10-PCS | Mod: GC,,, | Performed by: STUDENT IN AN ORGANIZED HEALTH CARE EDUCATION/TRAINING PROGRAM

## 2022-01-09 RX ORDER — MUPIROCIN 20 MG/G
OINTMENT TOPICAL 2 TIMES DAILY
Status: COMPLETED | OUTPATIENT
Start: 2022-01-09 | End: 2022-01-13

## 2022-01-09 RX ORDER — BISACODYL 10 MG
10 SUPPOSITORY, RECTAL RECTAL DAILY PRN
Status: DISCONTINUED | OUTPATIENT
Start: 2022-01-09 | End: 2022-01-25 | Stop reason: HOSPADM

## 2022-01-09 RX ORDER — LABETALOL HCL 20 MG/4 ML
10 SYRINGE (ML) INTRAVENOUS ONCE
Status: DISCONTINUED | OUTPATIENT
Start: 2022-01-09 | End: 2022-01-09

## 2022-01-09 RX ORDER — METOPROLOL SUCCINATE 100 MG/1
200 TABLET, EXTENDED RELEASE ORAL 2 TIMES DAILY
Status: DISCONTINUED | OUTPATIENT
Start: 2022-01-09 | End: 2022-01-09

## 2022-01-09 RX ORDER — HYDRALAZINE HYDROCHLORIDE 20 MG/ML
10 INJECTION INTRAMUSCULAR; INTRAVENOUS EVERY 6 HOURS PRN
Status: DISCONTINUED | OUTPATIENT
Start: 2022-01-09 | End: 2022-01-25 | Stop reason: HOSPADM

## 2022-01-09 RX ORDER — METOPROLOL TARTRATE 50 MG/1
200 TABLET ORAL 2 TIMES DAILY
Status: DISCONTINUED | OUTPATIENT
Start: 2022-01-09 | End: 2022-01-14

## 2022-01-09 RX ORDER — POLYETHYLENE GLYCOL 3350 17 G/17G
17 POWDER, FOR SOLUTION ORAL 2 TIMES DAILY
Status: DISCONTINUED | OUTPATIENT
Start: 2022-01-09 | End: 2022-01-25 | Stop reason: HOSPADM

## 2022-01-09 RX ADMIN — ATORVASTATIN CALCIUM 20 MG: 20 TABLET, FILM COATED ORAL at 08:01

## 2022-01-09 RX ADMIN — METOPROLOL TARTRATE 200 MG: 50 TABLET, FILM COATED ORAL at 08:01

## 2022-01-09 RX ADMIN — OXYCODONE HYDROCHLORIDE 10 MG: 5 SOLUTION ORAL at 04:01

## 2022-01-09 RX ADMIN — POLYETHYLENE GLYCOL 3350 17 G: 17 POWDER, FOR SOLUTION ORAL at 08:01

## 2022-01-09 RX ADMIN — THIAMINE HCL TAB 100 MG 100 MG: 100 TAB at 08:01

## 2022-01-09 RX ADMIN — ACETAMINOPHEN 650 MG: 325 TABLET ORAL at 12:01

## 2022-01-09 RX ADMIN — BACITRACIN 1 EACH: 500 OINTMENT TOPICAL at 08:01

## 2022-01-09 RX ADMIN — AMLODIPINE BESYLATE 10 MG: 10 TABLET ORAL at 08:01

## 2022-01-09 RX ADMIN — ACETAMINOPHEN 650 MG: 325 TABLET ORAL at 06:01

## 2022-01-09 RX ADMIN — HYDRALAZINE HYDROCHLORIDE 25 MG: 25 TABLET, FILM COATED ORAL at 08:01

## 2022-01-09 RX ADMIN — LOSARTAN POTASSIUM 100 MG: 50 TABLET, FILM COATED ORAL at 08:01

## 2022-01-09 RX ADMIN — MUPIROCIN: 20 OINTMENT TOPICAL at 08:01

## 2022-01-09 RX ADMIN — HYDRALAZINE HYDROCHLORIDE 25 MG: 25 TABLET, FILM COATED ORAL at 02:01

## 2022-01-09 RX ADMIN — OXYCODONE HYDROCHLORIDE 10 MG: 5 SOLUTION ORAL at 08:01

## 2022-01-09 RX ADMIN — ASPIRIN 81 MG CHEWABLE TABLET 81 MG: 81 TABLET CHEWABLE at 08:01

## 2022-01-09 RX ADMIN — DOCUSATE SODIUM 100 MG: 50 LIQUID ORAL at 08:01

## 2022-01-09 RX ADMIN — ACETAMINOPHEN 650 MG: 325 TABLET ORAL at 11:01

## 2022-01-09 RX ADMIN — BISACODYL 10 MG: 10 SUPPOSITORY RECTAL at 04:01

## 2022-01-09 RX ADMIN — FOLIC ACID 1 MG: 1 TABLET ORAL at 08:01

## 2022-01-09 RX ADMIN — ENOXAPARIN SODIUM 40 MG: 40 INJECTION SUBCUTANEOUS at 04:01

## 2022-01-09 NOTE — NURSING
"      SICU PLAN OF CARE NOTE    Dx: Squamous cell cancer of tongue    Shift Events: Requiring freq trach suctioning r/t thick creamy secretions.  SBP trended up toward end of shift requiring a PRN hydralazine IVP order to be placed.      Goals of Care: MAP > 65.  Awaiting bed availability for step down.  Step down orders in    Neuro: AAO x4    Vital Signs: BP (!) 163/73   Pulse 90   Temp 98.3 °F (36.8 °C) (Axillary)   Resp 13   Ht 5' 8" (1.727 m)   Wt 66.9 kg (147 lb 7.8 oz)   SpO2 (!) 93%   BMI 22.43 kg/m²     Respiratory: Trach collar, 8L/35%    Diet: NPO and Tube Feeds @ 50cc/hr (goal)    Gtts: None    Urine Output: Voids Spontaneously 675 cc/shift    Drains: RACHEL drains x 5:  2 right neck, 2 right leg, 1 left neck    Flap checks:  Q2 hrs     Labs/Accuchecks: AM labs    Skin:  Old bruising noted.  Waffle on bed.  Foam on sacrum.  Q 2 hr weight shifting       "

## 2022-01-09 NOTE — PT/OT/SLP PROGRESS
Occupational Therapy   Treatment    Name: Fareed Ricahrd Jr.  MRN: [id number]  Admitting Diagnosis:  Squamous cell cancer of tongue  5 Days Post-Op    Recommendations:     Discharge Recommendations: home with home health      Assessment:     Fareed Richard Jr. is a 71 y.o. male with a medical diagnosis of Squamous cell cancer of tongue. Performance deficits affecting function are weakness,impaired endurance,impaired self care skills,impaired functional mobilty,gait instability,impaired balance,decreased upper extremity function. Pt tolerated session fairly well. Pt needing encouragement to participate. Progress is slow at this time and to benefit from cont OT to address stated goals.     Rehab Prognosis:  Good; patient would benefit from acute skilled OT services to address these deficits and reach maximum level of function.       Plan:     Patient to be seen 4 x/week to address the above listed problems via self-care/home management,therapeutic activities,therapeutic exercises  · Plan of Care Expires:    · Plan of Care Reviewed with: patient,spouse    Subjective   Pt denies pain and SOB.   Pt reports weakness.     Pain/Comfort:  · Pain Rating 1: 0/10    Objective:     Communicated with: nsg prior to session.  Patient found in chair with RACHEL drains, tele, pulse ox and BP cuff.   Pt with trach collar in place and use speaking valve briefly during session but requested it to be removed.   Spouse present during session.     General Precautions: Standard, fall,NPO     Occupational Performance:       Functional Mobility/Transfers:  · CGA sit<>stand.     Activities of Daily Living:  Upon ambulation to sink, pt with fatigue and reports weakness thus unable to tolerate standing g/h skills.  G/H seated with set-up  Toileting: MAX A use of urinal in sitting.     [facility] 6 Click ADL: 12    Treatment & Education:  Pt found in chair. Pt rolling eyes and making faces of frustration upon therapy arrival and discussion of therapy plan.  Pt able to communicate with speaking valve that he felt weak. With encouragement and education, pt agreeable.   Pt unable to tolerate standing g/h skill. After ambulation approx 8 feet with MIN A, pt having to sit due to reported weakness.  HR stable and oxygenation 88-91%.   Nsg arrived to provide suction twice during session.     Pt completed UE/LE exs program x 10 reps x 3 LE planes and 1 UE plane . Education provided re: HEP to perform 3-4 x daily 10 reps each. Visual reminder placed on board in room and pt/spouse verb understanding. Education provided re: effects of prolonged immobility.     Education provided re: OT POC and safety with functional mobility/ADl skills.     Patient left up in chair with all lines intact, call button in reach, nsg notified and spouse presentEducation:      GOALS:   Multidisciplinary Problems     Occupational Therapy Goals        Problem: Occupational Therapy Goal    Goal Priority Disciplines Outcome Interventions   Occupational Therapy Goal     OT, PT/OT Ongoing, Progressing    Description: Goals to be met by: 7 days 1/12/22     Patient will increase functional independence with ADLs by performing:    Pt to complete standing g/h skills with SBA  Pt to complete UE dressing with DARRYL    Pt to complete LE dressing with MIN A   Pt to complete toileting with SBA  Pt to complete t/f to commode, bed and chair with SBA                     Time Tracking:     OT Date of Treatment: 01/09/22  OT Start Time: 1300  OT Stop Time: 1323  OT Total Time (min): 23 min    Billable Minutes:Therapeutic Activity 15  Therapeutic Exercise 8    OT/NELLY: OT          1/9/2022

## 2022-01-09 NOTE — SUBJECTIVE & OBJECTIVE
Interval History: No issue overnight    Medications:  Continuous Infusions:   sodium chloride 0.9% Stopped (01/06/22 0636)     Scheduled Meds:   acetaminophen  650 mg Per G Tube Q6H    amLODIPine  10 mg Per G Tube Daily    aspirin  81 mg Per G Tube Daily    atorvastatin  20 mg Per G Tube Daily    bacitracin zinc   Topical (Top) BID    docusate  100 mg Per G Tube BID    enoxaparin  40 mg Subcutaneous Daily    folic acid  1 mg Per G Tube Daily    hydrALAZINE  25 mg Per G Tube TID    losartan  100 mg Per G Tube Daily    metoprolol tartrate  200 mg Per G Tube BID    mupirocin   Nasal BID    polyethylene glycol  17 g Per G Tube BID    thiamine  100 mg Per G Tube Daily     PRN Meds:albuterol-ipratropium, bisacodyL, hydrALAZINE, HYDROmorphone, lorazepam, ondansetron, oxyCODONE, oxyCODONE, sodium chloride 0.9%     Review of patient's allergies indicates:  No Known Allergies  Objective:     Vital Signs (24h Range):  Temp:  [97 °F (36.1 °C)-98.6 °F (37 °C)] 97 °F (36.1 °C)  Pulse:  [] 78  Resp:  [10-23] 19  SpO2:  [89 %-98 %] 94 %  BP: (117-188)/(60-85) 140/84     Date 01/09/22 0700 - 01/10/22 0659   Shift 9325-4601 5761-6355 7814-5576 24 Hour Total   INTAKE   NG/   600   Shift Total(mL/kg) 600(9)   600(9)   OUTPUT   Urine(mL/kg/hr) 425   425   Drains 27   27   Shift Total(mL/kg) 452(6.8)   452(6.8)   Weight (kg) 66.9 66.9 66.9 66.9     Lines/Drains/Airways     Drain                 Closed/Suction Drain 01/04/22 1244 Right;Anterior Thigh Bulb 15 Fr. 5 days         Closed/Suction Drain 01/04/22 1246 Right;Anterior Thigh Bulb 15 Fr. 5 days         Gastrostomy/Enterostomy 01/04/22 Percutaneous endoscopic gastrostomy (PEG) LUQ 5 days         Closed/Suction Drain 01/04/22 1450 Left Neck Bulb 15 Fr. 4 days         Closed/Suction Drain 01/04/22 1450 Right Neck Bulb 15 Fr. 4 days         Closed/Suction Drain 01/04/22 1450 Right Neck Bulb 15 Fr. 4 days          Airway                 Surgical Airway 01/04/22  1546 Shiley Cuffed 4 days          Peripheral Intravenous Line                 Peripheral IV - Single Lumen 01/04/22 0516 18 G Right Antecubital 5 days         Peripheral IV - Single Lumen 01/08/22 0416 22 G Anterior;Distal;Left Upper Arm 1 day                Physical Exam  NAD, alert, and awake   NC/AT, EOMI, clear sclera  Normal external nose   MMM, rosemary-tongue ALTFF in place with good color that approximates the thigh, good tugar and warm, strong arterial doppler signal, and good venous signal from implantable doppler   Neck soft, advancement flaps in place without duskiness, staples intact, JPx2 on right with s/s output, RACHEL x 1 on left with s/s output  Right neck swelling stable from yesterday but becoming more tender, slightly warm  Intraoral sutures intact, no signs of dehiscence, will monitor   6-0 cuffed trach sutured in place, cuff down   Normal work of breathing, on trach collar   G tube in place    Significant Labs:  CBC:   Recent Labs   Lab 01/09/22  0317   WBC 12.05   RBC 3.00*   HGB 8.9*   HCT 29.4*      MCV 98   MCH 29.7   MCHC 30.3*     CMP:   Recent Labs   Lab 01/04/22  1636 01/05/22  0311 01/09/22  0317   *   < > 130*   CALCIUM 7.9*   < > 9.6   ALBUMIN 2.7*  --   --    PROT 4.5*  --   --    *   < > 135*   K 4.5   < > 4.3   CO2 18*   < > 31*      < > 98   BUN 6*   < > 23   CREATININE 0.6   < > 0.6   ALKPHOS 43*  --   --    ALT 9*  --   --    AST 17  --   --    BILITOT 1.6*  --   --     < > = values in this interval not displayed.       Significant Diagnostics:  I have reviewed and interpreted all pertinent imaging results/findings within the past 24 hours.

## 2022-01-09 NOTE — ASSESSMENT & PLAN NOTE
"71M with [id number] SCC of the oral tongue s/p total glossectomy, bilateral neck dissections levels I-IV, tracheostomy, and ALTFF reconstruction with G tube placement by general surgery. Doing well.     ENT   -Transition to q2h flap checks: Record Doppler Pulses (artery and vein) ,Capillary Refill , Color, Turgor, temperature.   -Neurovascular Checks q2h of bilateral upper and lower extremities   -Keep head elevated and in neutral position   -Sign above bed that reads "No circumferential Neck Ties"   -Sign above bed that reads "No ties around tracheostomy"   -NO VASOPRESSORS unless cleared by ENT Head and Neck Surgery Attending   -FOR ANY FLAP ISSUES, PLEASE PHONE ENT RESIDENT ON CALL.  -Please label drains carefully and document accordingly  -Bacitracin to all incisions/staple lines  -SLP consult for new tracheostomy and total glossectomy   - Monitor right neck for changes suggestive of fistula, will restart Unasyn if worsening    Neuro  -pain control    -scheduled tylenol    -oxycodone/dilaudid PRN   -CIWA protocol as precaution    -thiamine and folate     CV  -home medications restarted (amlodipine, metoprolol)    -metoprolol changed from succinate to tartrate. Have some room to titrate up if needed for BP control   -telemetry   -home atorvastatin  -on ASA. Patient reported not taking blood thinner pre-op. Will clarify if he was taking Plavix. Hold Plavix for now.       Resp  -fresh trach care   -trach collar   -will likely change to cuffless trach on Sunday vs Monday   -trach teaching with respiratory   -continuous pulse ox   -inhalers (fluticasone and tiotropium daily, duonebs PRN)    FENGI  -strict NPO, mIVFs, TFs at goal  -zofran PRN   -colace for bowel regimen     Renal  -CTM, no issues     Heme/ID   -Unasyn x 72 hours post-op     Endocrine   -no issues, monitor glucose     MSK   -PT/OT  -OOB     Ppx: L40, SCDs, NIKITA hose     Dispo: step down orders placed for PCU, q2h flap checks     Discussed ENT plan with " SICU resident

## 2022-01-09 NOTE — ASSESSMENT & PLAN NOTE
Fareed Richard . is a 71 y.o. M with hx of squamous cell carcinoma, HTN, CAD s/p stents, carotid stenosis s/p carotid endarterectomy, PAD s/p b/l femoral atherectomies and other LE interventions who was admitted to SICU and will undergo glossectomy and face/neck reconstruction with ENT on 1/4/21.    Neuro/Psych:  - No sedation  - Pain controlled: scheduled tylenol, oxycodone/dilaudid prn   #alcohol use  - patient reports drinking 3-4 beers per night, last drink was 1/1  - Thiamine/Folate  - CIWA protocol    CV  #CAD s/p stents  #Carotid stenosis s/p endarterectomy  #PVS s/p multiple LE interventions including b/l femoral arthetectomies  - continue atorv, ASA and holding plavix    #HTN  - Home meds: continue Amlodipine 10, Metoprolol 200, Losartan 100, Hydralazine 25 TID  - with episodes of HTN overnight, increase metoprolol to 200 BID today     Pulm  #COPD  #Former smoker  - venotlin inhaler  - duonebs prn    #Tracheostomy 1/4  - fresh trach, well healing  - on trach collar 8L / 35%humidified O2, wean as tolerated    GI  - Nutrition consult  -F: mIVFs off,continue 250 free water bolus via PEG TID  -E: replete prn  -N: TF 50 ml/hr, goal  - bowel regimen, suppository this PM if hasn't had a bowel movement this afternoon    Renal  No active issues  - BUN/Cr 23/0.6    Heme/Onc  #Squamous Cell Carcinoma of tongue   #s/p total glossectomy, tracheostomy and free flap reconstruction  - q2h flap checks, arterial doppler without issue     Endo  - no active issues       PPx:   Feeding: NPO, tube feeds at goal  Analgesia/Sedation: tylenol, oxycodone/dilaudid prn / none  Thromboembolic prevention: lovenox  HOB >30: yes  Stress Ulcer ppx: no  Glucose control: Critical care goal 140-180 g/dl, ISS    Lines/Drains/Airway: trach, PIV, RACHEL x 5:  2 right neck, 1 left neck and 2 right leg, PEG      Dispo/Code Status/Palliative:   -- SICU / Full Code/ Step down orders in place

## 2022-01-09 NOTE — PROGRESS NOTES
[facility] - [facility]ensive [facility]  Critical Care - Surgery  Progress Note    Patient Name: Fareed Richard Jr.  MRN: [id number]  Admission Date: 1/3/2022  Hospital Length of Stay: 6 days  Code Status: Full Code  Attending Provider: Naeem Smalls MD  Primary Care Provider: Kodi Tubbs MD   Principal Problem: Squamous cell cancer of tongue    Subjective:     Hospital/ICU Course:  Fareed Richard Jr. is a 71 y.o. M with hx of squamous cell carcinoma, HTN, CAD s/p stents, carotid stenosis s/p carotid endarterectomy in 2018, PAD s/p b/l femoral atherectomies and other LE interventions s/p total glossectomy, tracheostomy and free flap reconstruction with ENT on 1/4.  The patient has been doing well since surgery, requiring limited pain control.      Interval History/Significant Events:   No acute events overnight.  HTN episodes to the 160-170s requiring IV Hydralazine push.   Continues to tolerate tube feeds without issue  Improved urine output s/p free water flushes    Follow-up For: Procedure(s) (LRB):  GLOSSECTOMY (N/A)  TRACHEOTOMY (N/A)  EGD, WITH PEG TUBE INSERTION (N/A)  CREATION, FREE FLAP (Right)  DISSECTION, NECK (Bilateral)    Post-Operative Day: 5 Days Post-Op    Objective:     Vital Signs (Most Recent):  Temp: 98.3 °F (36.8 °C) (01/08/22 2300)  Pulse: 90 (01/09/22 0545)  Resp: 13 (01/09/22 0545)  BP: (!) 163/73 (01/09/22 0530)  SpO2: (!) 93 % (01/09/22 0545) Vital Signs (24h Range):  Temp:  [97.3 °F (36.3 °C)-98.6 °F (37 °C)] 98.3 °F (36.8 °C)  Pulse:  [] 90  Resp:  [10-23] 13  SpO2:  [89 %-99 %] 93 %  BP: (117-178)/(61-85) 163/73     Weight: 66.9 kg (147 lb 7.8 oz)  Body mass index is 22.43 kg/m².      Intake/Output Summary (Last 24 hours) at 1/9/2022 0611  Last data filed at 1/9/2022 0500  Gross per 24 hour   Intake 2205 ml   Output 1517 ml   Net 688 ml       Physical Exam  HENT:      Head:      Comments: Cervical facial flap soft, appropriate coloring, + triphasic artery  Pulses, Fresh trach  CDI     Nose: No congestion.      Mouth/Throat:      Mouth: Mucous membranes are moist.   Eyes:      Extraocular Movements: Extraocular movements intact.      Pupils: Pupils are equal, round, and reactive to light.   Cardiovascular:      Rate and Rhythm: Normal rate and regular rhythm.      Pulses: Normal pulses.      Heart sounds: Normal heart sounds. No murmur heard.      Pulmonary:      Effort: No respiratory distress.      Breath sounds: Normal breath sounds. No wheezing.      Comments: Trach collar  Abdominal:      General: Abdomen is flat. There is no distension.      Palpations: Abdomen is soft.      Tenderness: There is no abdominal tenderness.      Comments: G tube in place   Musculoskeletal:         General: No swelling or tenderness.      Cervical back: Normal range of motion and neck supple. No rigidity.      Comments: Donor Site: R thigh incision CDI, no signs of hematoma. R DP pulses intact. Drains with minimal SS output   Skin:     General: Skin is warm and dry.   Neurological:      General: No focal deficit present.      Mental Status: He is alert and oriented to person, place, and time.         Vents:  Oxygen Concentration (%): 35 (01/09/22 0300)    Lines/Drains/Airways     Drain                 Gastrostomy/Enterostomy 01/04/22 Percutaneous endoscopic gastrostomy (PEG) LUQ 5 days         Closed/Suction Drain 01/04/22 1244 Right;Anterior Thigh Bulb 15 Fr. 4 days         Closed/Suction Drain 01/04/22 1246 Right;Anterior Thigh Bulb 15 Fr. 4 days         Closed/Suction Drain 01/04/22 1450 Left Neck Bulb 15 Fr. 4 days         Closed/Suction Drain 01/04/22 1450 Right Neck Bulb 15 Fr. 4 days         Closed/Suction Drain 01/04/22 1450 Right Neck Bulb 15 Fr. 4 days          Airway                 Surgical Airway 01/04/22 1546 Shiley Cuffed 4 days          Peripheral Intravenous Line                 Peripheral IV - Single Lumen 01/04/22 0516 18 G Right Antecubital 5 days         Peripheral IV - Single Lumen  01/08/22 0416 22 G Anterior;Distal;Left Upper Arm 1 day                Significant Labs:    CBC/Anemia Profile:  Recent Labs   Lab 01/08/22  0323 01/09/22  0317   WBC 9.40 12.05   HGB 8.5* 8.9*   HCT 27.7* 29.4*    325   MCV 95 98   RDW 14.7* 14.6*        Chemistries:  Recent Labs   Lab 01/08/22  0323 01/09/22  0317    135*   K 5.0 4.3    98   CO2 34* 31*   BUN 23 23   CREATININE 0.6 0.6   CALCIUM 8.7 9.6   MG 2.2 2.1   PHOS 4.2 4.7*         Significant Imaging:  I have reviewed all pertinent imaging results/findings within the past 24 hours.    Assessment/Plan:     * Squamous cell cancer of tongue  Fareed Richard Jr. is a 71 y.o. M with hx of squamous cell carcinoma, HTN, CAD s/p stents, carotid stenosis s/p carotid endarterectomy, PAD s/p b/l femoral atherectomies and other LE interventions who was admitted to SICU and will undergo glossectomy and face/neck reconstruction with ENT on 1/4/21.    Neuro/Psych:  - No sedation  - Pain controlled: scheduled tylenol, oxycodone/dilaudid prn   #alcohol use  - patient reports drinking 3-4 beers per night, last drink was 1/1  - Thiamine/Folate  - [facility] protocol    CV  #CAD s/p stents  #Carotid stenosis s/p endarterectomy  #PVS s/p multiple LE interventions including b/l femoral arthetectomies  - continue atorv, ASA and holding plavix    #HTN  - Home meds: continue Amlodipine 10, Metoprolol 200, Losartan 100, Hydralazine 25 TID  - with episodes of HTN overnight, increase metoprolol to 200 BID today     Pulm  #COPD  #Former smoker  - venotlin inhaler  - duonebs prn    #Tracheostomy 1/4  - fresh trach, well healing  - on trach collar 8L / 35%humidified O2, wean as tolerated    GI  - Nutrition consult  -F: mIVFs off,continue 250 free water bolus via PEG TID  -E: replete prn  -N: TF 50 ml/hr, goal  - bowel regimen, suppository this PM if hasn't had a bowel movement this afternoon    Renal  No active issues  - BUN/Cr 23/0.6    Heme/Onc  #Squamous Cell Carcinoma  of tongue   #s/p total glossectomy, tracheostomy and free flap reconstruction  - q2h flap checks, arterial doppler without issue     Endo  - no active issues       PPx:   Feeding: NPO, tube feeds at goal  Analgesia/Sedation: tylenol, oxycodone/dilaudid prn / none  Thromboembolic prevention: lovenox  HOB >30: yes  Stress Ulcer ppx: no  Glucose control: Critical care goal 140-180 g/dl, ISS    Lines/Drains/Airway: trach, PIV, RACHEL x 5:  2 right neck, 1 left neck and 2 right leg, PEG      Dispo/Code Status/Palliative:   -- SICU / Full Code/ Step down orders in place      Critical secondary to flap checks.     Critical care was time spent personally by me on the following activities: development of treatment plan with patient or surrogate and bedside caregivers, discussions with consultants, evaluation of patient's response to treatment, examination of patient, ordering and performing treatments and interventions, ordering and review of laboratory studies, ordering and review of radiographic studies, pulse oximetry, re-evaluation of patient's condition.  This critical care time did not overlap with that of any other provider or involve time for any procedures.     Victor Manuel Retana MD  Critical Care - Surgery  [facility]

## 2022-01-09 NOTE — SUBJECTIVE & OBJECTIVE
Interval History/Significant Events:   No acute events overnight.  HTN episodes to the 160-170s requiring IV Hydralazine push.   Continues to tolerate tube feeds without issue  Improved urine output s/p free water flushes    Follow-up For: Procedure(s) (LRB):  GLOSSECTOMY (N/A)  TRACHEOTOMY (N/A)  EGD, WITH PEG TUBE INSERTION (N/A)  CREATION, FREE FLAP (Right)  DISSECTION, NECK (Bilateral)    Post-Operative Day: 5 Days Post-Op    Objective:     Vital Signs (Most Recent):  Temp: 98.3 °F (36.8 °C) (01/08/22 2300)  Pulse: 90 (01/09/22 0545)  Resp: 13 (01/09/22 0545)  BP: (!) 163/73 (01/09/22 0530)  SpO2: (!) 93 % (01/09/22 0545) Vital Signs (24h Range):  Temp:  [97.3 °F (36.3 °C)-98.6 °F (37 °C)] 98.3 °F (36.8 °C)  Pulse:  [] 90  Resp:  [10-23] 13  SpO2:  [89 %-99 %] 93 %  BP: (117-178)/(61-85) 163/73     Weight: 66.9 kg (147 lb 7.8 oz)  Body mass index is 22.43 kg/m².      Intake/Output Summary (Last 24 hours) at 1/9/2022 0611  Last data filed at 1/9/2022 0500  Gross per 24 hour   Intake 2205 ml   Output 1517 ml   Net 688 ml       Physical Exam  HENT:      Head:      Comments: Cervical facial flap soft, appropriate coloring, + triphasic artery  Pulses, Fresh trach CDI     Nose: No congestion.      Mouth/Throat:      Mouth: Mucous membranes are moist.   Eyes:      Extraocular Movements: Extraocular movements intact.      Pupils: Pupils are equal, round, and reactive to light.   Cardiovascular:      Rate and Rhythm: Normal rate and regular rhythm.      Pulses: Normal pulses.      Heart sounds: Normal heart sounds. No murmur heard.      Pulmonary:      Effort: No respiratory distress.      Breath sounds: Normal breath sounds. No wheezing.      Comments: Trach collar  Abdominal:      General: Abdomen is flat. There is no distension.      Palpations: Abdomen is soft.      Tenderness: There is no abdominal tenderness.      Comments: G tube in place   Musculoskeletal:         General: No swelling or tenderness.       Cervical back: Normal range of motion and neck supple. No rigidity.      Comments: Donor Site: R thigh incision CDI, no signs of hematoma. R DP pulses intact. Drains with minimal SS output   Skin:     General: Skin is warm and dry.   Neurological:      General: No focal deficit present.      Mental Status: He is alert and oriented to person, place, and time.         Vents:  Oxygen Concentration (%): 35 (01/09/22 0300)    Lines/Drains/Airways     Drain                 Gastrostomy/Enterostomy 01/04/22 Percutaneous endoscopic gastrostomy (PEG) LUQ 5 days         Closed/Suction Drain 01/04/22 1244 Right;Anterior Thigh Bulb 15 Fr. 4 days         Closed/Suction Drain 01/04/22 1246 Right;Anterior Thigh Bulb 15 Fr. 4 days         Closed/Suction Drain 01/04/22 1450 Left Neck Bulb 15 Fr. 4 days         Closed/Suction Drain 01/04/22 1450 Right Neck Bulb 15 Fr. 4 days         Closed/Suction Drain 01/04/22 1450 Right Neck Bulb 15 Fr. 4 days          Airway                 Surgical Airway 01/04/22 1546 Shiley Cuffed 4 days          Peripheral Intravenous Line                 Peripheral IV - Single Lumen 01/04/22 0516 18 G Right Antecubital 5 days         Peripheral IV - Single Lumen 01/08/22 0416 22 G Anterior;Distal;Left Upper Arm 1 day                Significant Labs:    CBC/Anemia Profile:  Recent Labs   Lab 01/08/22  0323 01/09/22  0317   WBC 9.40 12.05   HGB 8.5* 8.9*   HCT 27.7* 29.4*    325   MCV 95 98   RDW 14.7* 14.6*        Chemistries:  Recent Labs   Lab 01/08/22  0323 01/09/22  0317    135*   K 5.0 4.3    98   CO2 34* 31*   BUN 23 23   CREATININE 0.6 0.6   CALCIUM 8.7 9.6   MG 2.2 2.1   PHOS 4.2 4.7*         Significant Imaging:  I have reviewed all pertinent imaging results/findings within the past 24 hours.

## 2022-01-09 NOTE — PROGRESS NOTES
[facility] - [facility]  Otorhinolaryngology-Head & Neck Surgery  Progress Note    Subjective:     Post-Op Info:  Procedure(s) (LRB):  GLOSSECTOMY (N/A)  TRACHEOTOMY (N/A)  EGD, WITH PEG TUBE INSERTION (N/A)  CREATION, FREE FLAP (Right)  DISSECTION, NECK (Bilateral)   5 Days Post-Op  Hospital Day: 7     Interval History: No issue overnight    Medications:  Continuous Infusions:   sodium chloride 0.9% Stopped (01/06/22 0636)     Scheduled Meds:   acetaminophen  650 mg Per G Tube Q6H    amLODIPine  10 mg Per G Tube Daily    aspirin  81 mg Per G Tube Daily    atorvastatin  20 mg Per G Tube Daily    bacitracin zinc   Topical (Top) BID    docusate  100 mg Per G Tube BID    enoxaparin  40 mg Subcutaneous Daily    folic acid  1 mg Per G Tube Daily    hydrALAZINE  25 mg Per G Tube TID    losartan  100 mg Per G Tube Daily    metoprolol tartrate  200 mg Per G Tube BID    mupirocin   Nasal BID    polyethylene glycol  17 g Per G Tube BID    thiamine  100 mg Per G Tube Daily     PRN Meds:albuterol-ipratropium, bisacodyL, hydrALAZINE, HYDROmorphone, lorazepam, ondansetron, oxyCODONE, oxyCODONE, sodium chloride 0.9%     Review of patient's allergies indicates:  No Known Allergies  Objective:     Vital Signs (24h Range):  Temp:  [97 °F (36.1 °C)-98.6 °F (37 °C)] 97 °F (36.1 °C)  Pulse:  [] 78  Resp:  [10-23] 19  SpO2:  [89 %-98 %] 94 %  BP: (117-188)/(60-85) 140/84     Date 01/09/22 0700 - 01/10/22 0659   Shift 2848-4977 0556-5478 0524-3523 24 Hour Total   INTAKE   NG/   600   Shift Total(mL/kg) 600(9)   600(9)   OUTPUT   Urine(mL/kg/hr) 425   425   Drains 27   27   Shift Total(mL/kg) 452(6.8)   452(6.8)   Weight (kg) 66.9 66.9 66.9 66.9     Lines/Drains/Airways     Drain                 Closed/Suction Drain 01/04/22 1244 Right;Anterior Thigh Bulb 15 Fr. 5 days         Closed/Suction Drain 01/04/22 1246 Right;Anterior Thigh Bulb 15 Fr. 5 days         Gastrostomy/Enterostomy 01/04/22  Percutaneous endoscopic gastrostomy (PEG) LUQ 5 days         Closed/Suction Drain 01/04/22 1450 Left Neck Bulb 15 Fr. 4 days         Closed/Suction Drain 01/04/22 1450 Right Neck Bulb 15 Fr. 4 days         Closed/Suction Drain 01/04/22 1450 Right Neck Bulb 15 Fr. 4 days          Airway                 Surgical Airway 01/04/22 1546 Shiley Cuffed 4 days          Peripheral Intravenous Line                 Peripheral IV - Single Lumen 01/04/22 0516 18 G Right Antecubital 5 days         Peripheral IV - Single Lumen 01/08/22 0416 22 G Anterior;Distal;Left Upper Arm 1 day                Physical Exam  NAD, alert, and awake   NC/AT, EOMI, clear sclera  Normal external nose   MMM, rosemary-tongue ALTFF in place with good color that approximates the thigh, good tugar and warm, strong arterial doppler signal, and good venous signal from implantable doppler   Neck soft, advancement flaps in place without duskiness, staples intact, JPx2 on right with s/s output, RACHEL x 1 on left with s/s output  Right neck swelling stable from yesterday but becoming more tender, slightly warm  Intraoral sutures intact, no signs of dehiscence, will monitor   6-0 cuffed trach sutured in place, cuff down   Normal work of breathing, on trach collar   G tube in place    Significant Labs:  CBC:   Recent Labs   Lab 01/09/22 0317   WBC 12.05   RBC 3.00*   HGB 8.9*   HCT 29.4*      MCV 98   MCH 29.7   MCHC 30.3*     CMP:   Recent Labs   Lab 01/04/22  1636 01/05/22  0311 01/09/22  0317   *   < > 130*   CALCIUM 7.9*   < > 9.6   ALBUMIN 2.7*  --   --    PROT 4.5*  --   --    *   < > 135*   K 4.5   < > 4.3   CO2 18*   < > 31*      < > 98   BUN 6*   < > 23   CREATININE 0.6   < > 0.6   ALKPHOS 43*  --   --    ALT 9*  --   --    AST 17  --   --    BILITOT 1.6*  --   --     < > = values in this interval not displayed.       Significant Diagnostics:  I have reviewed and interpreted all pertinent imaging results/findings within the past 24  "hours.    Assessment/Plan:     * Squamous cell cancer of tongue  71M with [id number] SCC of the oral tongue s/p total glossectomy, bilateral neck dissections levels I-IV, tracheostomy, and ALTFF reconstruction with G tube placement by general surgery. Doing well.     ENT   -Transition to q2h flap checks: Record Doppler Pulses (artery and vein) ,Capillary Refill , Color, Turgor, temperature.   -Neurovascular Checks q2h of bilateral upper and lower extremities   -Keep head elevated and in neutral position   -Sign above bed that reads "No circumferential Neck Ties"   -Sign above bed that reads "No ties around tracheostomy"   -NO VASOPRESSORS unless cleared by ENT Head and Neck Surgery Attending   -FOR ANY FLAP ISSUES, PLEASE PHONE ENT RESIDENT ON CALL.  -Please label drains carefully and document accordingly  -Bacitracin to all incisions/staple lines  -SLP consult for new tracheostomy and total glossectomy   - Monitor right neck for changes suggestive of fistula, will restart Unasyn if worsening    Neuro  -pain control    -scheduled tylenol    -oxycodone/dilaudid PRN   -WA protocol as precaution    -thiamine and folate     CV  -home medications restarted (amlodipine, metoprolol)    -metoprolol changed from succinate to tartrate. Have some room to titrate up if needed for BP control   -telemetry   -home atorvastatin  -on ASA. Patient reported not taking blood thinner pre-op. Will clarify if he was taking Plavix. Hold Plavix for now.       Resp  -fresh trach care   -trach collar   -will likely change to cuffless trach on Sunday vs Monday   -trach teaching with respiratory   -continuous pulse ox   -inhalers (fluticasone and tiotropium daily, duonebs PRN)    FENGI  -strict NPO, mIVFs, TFs at goal  -zofran PRN   -colace for bowel regimen     Renal  -CTM, no issues     Heme/ID   -Unasyn x 72 hours post-op     Endocrine   -no issues, monitor glucose     MSK   -PT/OT  -OOB     Ppx: L40, SCDs, NIKITA hose     Dispo: step down " orders placed for PCU, q2h flap checks     Discussed ENT plan with SICU resident           Slava Alvarez MD  Otorhinolaryngology-Head & Neck Surgery  [facility] - [facility] Int[facility]

## 2022-01-09 NOTE — OP NOTE
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 Direct laryngoscopy  2. Tracheostomy  3. Bilateral modified neck dissections of levels 1 through 4 with resection of the left submandibular cervical skin  4. Total glossectomy     ANESTHESIA: General      INDICATIONS FOR PROCEDURE:   Fareed Richard Jr. is a 71 y.o. 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  He was noted to have adenopathy in the left submandibular region with involving the overlying skin.  Given the above, I recommended that we proceed to the operating room for resection and reconstruction.  I enlisted the services of my colleague, Dr. Conde, to perform the reconstructive aspect of this operation.  Please see his note for details.    He was apprised of the risks, benefits and alternatives to surgery.  In spite of the risk inherent to surgery,he provided informed consent for the aforementioned procedures.     PROCEDURE IN DETAIL:  The patient was taken to the operating room and placed on the operating table in the supine position.  General endotracheal anesthesia was induced by the anesthesia team.     To begin, the tumor was assessed.  There was noted to be an ulcerated tumor of the left anterolateral aspect of the oral tongue.  There was extensive submucosal involvement involving the vast majority of the tongue.  I anticipated that resection would entail a total glossectomy.  Direct laryngoscopy was then carried out to assess the oropharynx.  There was no obvious mucosal  disease within the oropharynx.  The vallecular was clear.  All disease aside from the anterior and lateral most tumor involving the submucosa.    Next, the operating table was rotated 180°.  Apron incisions were marked bilaterally in order to allow for bilateral cervical facial advancement flap closure of the anticipated skin defect of the neck.  I marked out a roughly 1 cm margin surrounding the obvious disease involving the cervical skin.  Trach incision was also marked.  All intended incisions were then injected with several cc of 1% lidocaine with epinephrine.  The neck was then prepped and draped in the standard sterile fashion.    To begin, the tracheostomy was performed.  The skin of the trach site was incised utilizing the Bovie on cutting current.  Sharp dissection proceeded down to the underlying subcutaneous tissue and platysma.  The midline was identified and divided with Bovie.  The strap musculature was then bluntly dissected off of the underlying thyroid gland and was retracted laterally on each side.  The thyroid isthmus was then bluntly dissected away from the underlying trachea and was divided with the Bovie.  The airway was then entered between the 1st and 2nd tracheal rings.  An inferiorly based Nhung flap was then fashion by making downward cuts on either side of the tracheostomy utilizing curved Crouch scissors.  The flap was secured to the neck skin utilizing 3-0 Vicryl suture.  With the flap secured, the endotracheal tube was withdrawn and a size 7 reinforced endotracheal tube was placed into the airway.  Placement in the airway was confirmed with return of end-tidal CO2.  The endotracheal tube was then secured to the right chest utilizing silk suture.    Next, the neck incisions were carried out.  The area of skin involved by tumor was incised circumferentially.  Circumferential margins were sent.  All margins were found to be negative for carcinoma except for the anterior margin which was  found to be positive.  Additional 2 cm was then marked and excised.  Margin beyond this was then sent for frozen section analysis and was found to be negative for carcinoma.  A superiorly and inferiorly-based subplatysmal flaps were then elevated bilaterally.  The flaps were retracted.  Dissection began on the left side where the nodes within level 1B were identified.  The periosteum of the mandible above these nodes was incised and subperiosteal dissection was carried out utilizing 9. The periosteum  readily indicating that the underlying mandible was not involved.  It became clear that we would have to resect the level 1A and level 1B contents on the left side en block with the tumor  sore attention was turned to levels 2, 3 and 4.    The fascia beneath the left submandibular gland was incised utilizing the Bovie.  Dissection proceeded deep to the submandibular gland.  The gland was retracted superiorly to identify the digastric tendon and facial vein.  The facial vein was isolated, clamped, divided and suture ligated.  Adequate length was left for later microvascular anastomosis.  Dissection then proceeded along the digastric.  The fascia was divided back to the level of the sternocleidomastoid muscle.  The sternocleidomastoid was then skeletonized along its full length utilizing electrocautery.  There was retracted laterally.  The spinal accessory nerve was identified in the standard position and followed cephalad to the skull base.  The junction of the spinal accessory nerve, posterior belly of digastric and internal jugular vein was identified.  The fibrofatty tissue at the lateral aspect of the internal jugular vein was bluntly dissected free and divided with the Bovie.  The level 2b bela packet was then divided along the sternocleidomastoid muscle and the skull base.  It was then swept deep to the spinal accessory nerve remaining in continuity with remainder of the neck dissection specimen.  Level  2A, 3 and 4 were then transected down to the underlying deep cervical fascia and cervical rootlets.  The omohyoid muscle was circumferentially dissected and divided with the Bovie.  The lateral aspect of the internal jugular vein was skeletonized inferiorly.  The transverse cervical vessels and phrenic nerve were identified and preserved.  Several pedicles were taken with the LigaSure in order to prevent a Chyle leak.  The specimen was elevated from posterior to anterior elevating it off of the underlying cervical rootlets and deep cervical fascia.  The carotid sheath was then entered.  The specimen was freed from the carotid sheath structures utilizing a 15 blade.  Was ultimately amputated and sent to pathology for permanent analysis.    , an identical procedure was then carried out on the right side.  Once the specimen was amputated was sent to pathology for permanent analysis.    Next, level 1B on the right was addressed.  The marginal mandibular nerve was identified and preserved and swept cephalad.  The fibrofatty tissue overlying the mandible was divided free of the mandible and kept in continuity with the submandibular gland.  The facial vessels were identified posteriorly.  These were circumferentially dissected, clamped, divided and suture ligated.  The specimen was then brought from superior to inferior off the mandible.  The mylohyoid was identified and skeletonized.  The mylohyoid neurovascular bundle was isolated, clipped and divided.  The digastric muscle was then skeletonized and the facial vein was isolated, clamped, divided and suture ligated posteriorly.  The mylohyoid was retracted anteriorly.  The lingual nerve and submandibular ganglion were identified.  The ganglion was isolated, clipped and divided as was Thayer's duct.  The specimen was then amputated and sent to pathology for permanent analysis.    Next, the fibrofatty contents of level 1A were elevated off of the underlying digastric and  mylohyoid and kept in continuity with the left level 1B contents.  The marginal mandibular nerve on the left was then isolated, freed and swept superiorly.  The digastric muscle, mylohyoid in genial hyoid were then divided from the neck.  There were divided off their attachments to the mandible.  The oral cavity was then entered.  The tumor was visualized and noted to be encompassing nearly the entirety of the tongue.  Before mouth was incised bilaterally down through the mylohyoid muscle.  The specimen was then delivered into the neck.  The attachments to the tonsillar fossa posteriorly were freed utilizing electrocautery.  The lingual artery and hypoglossal nerve were identified on the right.  These were clamped, divided and suture ligated.  On the left, the lingual nerve was identified.  Was taken to the skull base and sent for frozen section analysis.  It was found to be negative for carcinoma.  On the left the lingual artery, facial artery hypoglossal nerve were identified, clamped and divided.  The mucosal attachments to the vallecular were then divided as were the remaining suprahyoid muscles and the specimen was amputated.  Frozen section analysis of the anterior floor of mouth margin, left lateral floor of mouth margin and base of tongue margin were found to be negative for carcinoma.    Hemostasis was then achieved in the wound utilizing electrocautery.  He was then handed over to Dr. Conde for reconstruction.    There were no intraoperative complications.  I was present for and participated in the entire procedure as dictated above.       ESTIMATED BLOOD LOSS: 150 mL    SPECIMENS:   Specimen (24h ago, onward)    1. Superior skin margin - Frozen 2. Anterior skin margin - Frozen 3. Inferior skin margin - Frozen 4. Posterior skin margin - Frozen 5. Left neck dissection levels 2, 3, and 4 - Permanent 6. Anterior skin margin - Permanent 7. New anterior skin margin - Frozen 8. Right neck dissection levels 2, 3,  and 4 - Permanent 9. Right neck dissection level 1B - Permanent 10. Left lingual nerve - Frozen 11. Total glossectomy, left neck dissection level 1B with cervical skin - Frozen

## 2022-01-10 LAB
ALLENS TEST: ABNORMAL
ANION GAP SERPL CALC-SCNC: 8 MMOL/L (ref 8–16)
BASOPHILS # BLD AUTO: 0.04 K/UL (ref 0–0.2)
BASOPHILS NFR BLD: 0.4 % (ref 0–1.9)
BUN SERPL-MCNC: 25 MG/DL (ref 8–23)
CALCIUM SERPL-MCNC: 9.6 MG/DL (ref 8.7–10.5)
CHLORIDE SERPL-SCNC: 97 MMOL/L (ref 95–110)
CO2 SERPL-SCNC: 30 MMOL/L (ref 23–29)
CREAT SERPL-MCNC: 0.6 MG/DL (ref 0.5–1.4)
DELSYS: ABNORMAL
DIFFERENTIAL METHOD: ABNORMAL
EOSINOPHIL # BLD AUTO: 0.5 K/UL (ref 0–0.5)
EOSINOPHIL NFR BLD: 5 % (ref 0–8)
ERYTHROCYTE [DISTWIDTH] IN BLOOD BY AUTOMATED COUNT: 14.6 % (ref 11.5–14.5)
EST. GFR  (AFRICAN AMERICAN): >60 ML/MIN/1.73 M^2
EST. GFR  (NON AFRICAN AMERICAN): >60 ML/MIN/1.73 M^2
FLOW: 10
GLUCOSE SERPL-MCNC: 117 MG/DL (ref 70–110)
HCO3 UR-SCNC: 30.1 MMOL/L (ref 24–28)
HCT VFR BLD AUTO: 27.1 % (ref 40–54)
HCT VFR BLD CALC: 26 %PCV (ref 36–54)
HGB BLD-MCNC: 8.4 G/DL (ref 14–18)
IMM GRANULOCYTES # BLD AUTO: 0.06 K/UL (ref 0–0.04)
IMM GRANULOCYTES NFR BLD AUTO: 0.6 % (ref 0–0.5)
LYMPHOCYTES # BLD AUTO: 0.7 K/UL (ref 1–4.8)
LYMPHOCYTES NFR BLD: 6.9 % (ref 18–48)
MAGNESIUM SERPL-MCNC: 2.1 MG/DL (ref 1.6–2.6)
MCH RBC QN AUTO: 29.5 PG (ref 27–31)
MCHC RBC AUTO-ENTMCNC: 31 G/DL (ref 32–36)
MCV RBC AUTO: 95 FL (ref 82–98)
MODE: ABNORMAL
MONOCYTES # BLD AUTO: 1.2 K/UL (ref 0.3–1)
MONOCYTES NFR BLD: 11.9 % (ref 4–15)
NEUTROPHILS # BLD AUTO: 7.3 K/UL (ref 1.8–7.7)
NEUTROPHILS NFR BLD: 75.2 % (ref 38–73)
NRBC BLD-RTO: 0 /100 WBC
PCO2 BLDA: 49.8 MMHG (ref 35–45)
PH SMN: 7.39 [PH] (ref 7.35–7.45)
PHOSPHATE SERPL-MCNC: 4.8 MG/DL (ref 2.7–4.5)
PLATELET # BLD AUTO: 321 K/UL (ref 150–450)
PMV BLD AUTO: 9.3 FL (ref 9.2–12.9)
PO2 BLDA: 58 MMHG (ref 80–100)
POC BE: 5 MMOL/L
POC IONIZED CALCIUM: 1.28 MMOL/L (ref 1.06–1.42)
POC SATURATED O2: 89 % (ref 95–100)
POC TCO2: 32 MMOL/L (ref 23–27)
POTASSIUM BLD-SCNC: 3.9 MMOL/L (ref 3.5–5.1)
POTASSIUM SERPL-SCNC: 4.2 MMOL/L (ref 3.5–5.1)
RBC # BLD AUTO: 2.85 M/UL (ref 4.6–6.2)
SAMPLE: ABNORMAL
SITE: ABNORMAL
SODIUM BLD-SCNC: 135 MMOL/L (ref 136–145)
SODIUM SERPL-SCNC: 135 MMOL/L (ref 136–145)
WBC # BLD AUTO: 9.65 K/UL (ref 3.9–12.7)

## 2022-01-10 PROCEDURE — 27200966 HC CLOSED SUCTION SYSTEM

## 2022-01-10 PROCEDURE — 25000003 PHARM REV CODE 250: Performed by: STUDENT IN AN ORGANIZED HEALTH CARE EDUCATION/TRAINING PROGRAM

## 2022-01-10 PROCEDURE — 25000003 PHARM REV CODE 250

## 2022-01-10 PROCEDURE — 63600175 PHARM REV CODE 636 W HCPCS: Performed by: STUDENT IN AN ORGANIZED HEALTH CARE EDUCATION/TRAINING PROGRAM

## 2022-01-10 PROCEDURE — 27000221 HC OXYGEN, UP TO 24 HOURS

## 2022-01-10 PROCEDURE — 25000003 PHARM REV CODE 250: Performed by: OTOLARYNGOLOGY

## 2022-01-10 PROCEDURE — 99900026 HC AIRWAY MAINTENANCE (STAT)

## 2022-01-10 PROCEDURE — 25000242 PHARM REV CODE 250 ALT 637 W/ HCPCS

## 2022-01-10 PROCEDURE — 83735 ASSAY OF MAGNESIUM: CPT | Performed by: STUDENT IN AN ORGANIZED HEALTH CARE EDUCATION/TRAINING PROGRAM

## 2022-01-10 PROCEDURE — 93010 ELECTROCARDIOGRAM REPORT: CPT | Mod: ,,, | Performed by: INTERNAL MEDICINE

## 2022-01-10 PROCEDURE — 93005 ELECTROCARDIOGRAM TRACING: CPT

## 2022-01-10 PROCEDURE — 99223 1ST HOSP IP/OBS HIGH 75: CPT | Mod: ,,, | Performed by: STUDENT IN AN ORGANIZED HEALTH CARE EDUCATION/TRAINING PROGRAM

## 2022-01-10 PROCEDURE — 36600 WITHDRAWAL OF ARTERIAL BLOOD: CPT

## 2022-01-10 PROCEDURE — 99223 PR INITIAL HOSPITAL CARE,LEVL III: ICD-10-PCS | Mod: ,,, | Performed by: STUDENT IN AN ORGANIZED HEALTH CARE EDUCATION/TRAINING PROGRAM

## 2022-01-10 PROCEDURE — 36415 COLL VENOUS BLD VENIPUNCTURE: CPT | Performed by: STUDENT IN AN ORGANIZED HEALTH CARE EDUCATION/TRAINING PROGRAM

## 2022-01-10 PROCEDURE — 85014 HEMATOCRIT: CPT

## 2022-01-10 PROCEDURE — 93010 EKG 12-LEAD: ICD-10-PCS | Mod: ,,, | Performed by: INTERNAL MEDICINE

## 2022-01-10 PROCEDURE — 84100 ASSAY OF PHOSPHORUS: CPT | Performed by: STUDENT IN AN ORGANIZED HEALTH CARE EDUCATION/TRAINING PROGRAM

## 2022-01-10 PROCEDURE — 82803 BLOOD GASES ANY COMBINATION: CPT

## 2022-01-10 PROCEDURE — 20000000 HC ICU ROOM

## 2022-01-10 PROCEDURE — 82330 ASSAY OF CALCIUM: CPT

## 2022-01-10 PROCEDURE — 85025 COMPLETE CBC W/AUTO DIFF WBC: CPT | Performed by: STUDENT IN AN ORGANIZED HEALTH CARE EDUCATION/TRAINING PROGRAM

## 2022-01-10 PROCEDURE — 25000242 PHARM REV CODE 250 ALT 637 W/ HCPCS: Performed by: STUDENT IN AN ORGANIZED HEALTH CARE EDUCATION/TRAINING PROGRAM

## 2022-01-10 PROCEDURE — 84295 ASSAY OF SERUM SODIUM: CPT

## 2022-01-10 PROCEDURE — 84132 ASSAY OF SERUM POTASSIUM: CPT

## 2022-01-10 PROCEDURE — 99900035 HC TECH TIME PER 15 MIN (STAT)

## 2022-01-10 PROCEDURE — 94760 N-INVAS EAR/PLS OXIMETRY 1: CPT

## 2022-01-10 PROCEDURE — 80048 BASIC METABOLIC PNL TOTAL CA: CPT | Performed by: STUDENT IN AN ORGANIZED HEALTH CARE EDUCATION/TRAINING PROGRAM

## 2022-01-10 PROCEDURE — 94761 N-INVAS EAR/PLS OXIMETRY MLT: CPT

## 2022-01-10 RX ORDER — ACETYLCYSTEINE 100 MG/ML
4 SOLUTION ORAL; RESPIRATORY (INHALATION)
Status: DISCONTINUED | OUTPATIENT
Start: 2022-01-10 | End: 2022-01-13

## 2022-01-10 RX ADMIN — DOCUSATE SODIUM 100 MG: 50 LIQUID ORAL at 08:01

## 2022-01-10 RX ADMIN — DOCUSATE SODIUM 100 MG: 50 LIQUID ORAL at 09:01

## 2022-01-10 RX ADMIN — METOPROLOL TARTRATE 200 MG: 50 TABLET, FILM COATED ORAL at 09:01

## 2022-01-10 RX ADMIN — MUPIROCIN: 20 OINTMENT TOPICAL at 08:01

## 2022-01-10 RX ADMIN — AMLODIPINE BESYLATE 10 MG: 10 TABLET ORAL at 09:01

## 2022-01-10 RX ADMIN — ACETAMINOPHEN 650 MG: 325 TABLET ORAL at 11:01

## 2022-01-10 RX ADMIN — IPRATROPIUM BROMIDE AND ALBUTEROL SULFATE 3 ML: 2.5; .5 SOLUTION RESPIRATORY (INHALATION) at 08:01

## 2022-01-10 RX ADMIN — POLYETHYLENE GLYCOL 3350 17 G: 17 POWDER, FOR SOLUTION ORAL at 09:01

## 2022-01-10 RX ADMIN — ATORVASTATIN CALCIUM 20 MG: 20 TABLET, FILM COATED ORAL at 09:01

## 2022-01-10 RX ADMIN — ENOXAPARIN SODIUM 40 MG: 40 INJECTION SUBCUTANEOUS at 05:01

## 2022-01-10 RX ADMIN — ACETYLCYSTEINE 4 ML: 100 INHALANT RESPIRATORY (INHALATION) at 08:01

## 2022-01-10 RX ADMIN — ACETAMINOPHEN 650 MG: 325 TABLET ORAL at 12:01

## 2022-01-10 RX ADMIN — HYDRALAZINE HYDROCHLORIDE 25 MG: 25 TABLET, FILM COATED ORAL at 03:01

## 2022-01-10 RX ADMIN — HYDRALAZINE HYDROCHLORIDE 25 MG: 25 TABLET, FILM COATED ORAL at 09:01

## 2022-01-10 RX ADMIN — ASPIRIN 81 MG CHEWABLE TABLET 81 MG: 81 TABLET CHEWABLE at 09:01

## 2022-01-10 RX ADMIN — THIAMINE HCL TAB 100 MG 100 MG: 100 TAB at 09:01

## 2022-01-10 RX ADMIN — MUPIROCIN: 20 OINTMENT TOPICAL at 09:01

## 2022-01-10 RX ADMIN — POLYETHYLENE GLYCOL 3350 17 G: 17 POWDER, FOR SOLUTION ORAL at 08:01

## 2022-01-10 RX ADMIN — BACITRACIN: 500 OINTMENT TOPICAL at 09:01

## 2022-01-10 RX ADMIN — FOLIC ACID 1 MG: 1 TABLET ORAL at 09:01

## 2022-01-10 RX ADMIN — BACITRACIN 1 EACH: 500 OINTMENT TOPICAL at 09:01

## 2022-01-10 RX ADMIN — LORAZEPAM 2 MG: 2 INJECTION INTRAMUSCULAR; INTRAVENOUS at 01:01

## 2022-01-10 RX ADMIN — ACETAMINOPHEN 650 MG: 325 TABLET ORAL at 05:01

## 2022-01-10 RX ADMIN — LOSARTAN POTASSIUM 100 MG: 50 TABLET, FILM COATED ORAL at 09:01

## 2022-01-10 NOTE — PLAN OF CARE
[facility] - [facility] Int[facility]  Discharge Reassessment    Primary Care Provider: Kodi Tubbs MD    Expected Discharge Date: 1/14/2022       The patient is not medically stable to discharge at this time.     Reassessment (most recent)     Discharge Reassessment - 01/10/22 1125        Discharge Reassessment    Assessment Type Discharge Planning Reassessment     Communicated NISA with patient/caregiver Date not available/Unable to determine     Discharge Plan A Home Health     Discharge Plan B Home with family     DME Needed Upon Discharge  other (see comments)   TBD    Why the patient remains in the hospital Requires continued medical care        Post-Acute Status    Post-Acute Authorization Home Health     Home Health Status --   List Provided  (PHN)              According to MD's Note, Fareed Richard Jr. is a 71 y.o. M with hx of squamous cell carcinoma, HTN, CAD s/p stents, carotid stenosis s/p carotid endarterectomy in 2018, PAD s/p b/l femoral atherectomies and other LE interventions s/p total glossectomy, tracheostomy and free flap reconstruction with ENT on 1/4.  The patient has been doing well since surgery, requiring limited pain control.      The SW will continue to follow up with the patient to assist with discharge planning.       Jordan Aceves LMSW  Case Management [facility]  Ext: [phone]

## 2022-01-10 NOTE — CONSULTS
Consult received. Pt being followed, please see full assessment on 1/5.    Recommendations     1. Rec Impact Peptide 1.5 advancing as tolerated until goal of 50 mL/hr providing pt with 1800 kcal, 113 g protein, and 924 mL free water               -Bolus: Impact Peptide 1.5 5 cans/day - 1875 kcal, 118 g protein, and 965 mL free water      2. Monitor and follow-up       Please re-consult as needed.    Thanks!  [id number]

## 2022-01-10 NOTE — SIGNIFICANT EVENT
Primary RN called to room by rounding ENT team stating patient was on the floor found down. RN arrived to see patient supine on the floor, head flush against the wall with 2 new altered skin integrity sites. Vitals collected, ABG, head CT, chest Xray ordered. Patient stepped up to SICU. Bedside report given.

## 2022-01-10 NOTE — PROGRESS NOTES
[facility] - [facility]  Otorhinolaryngology-Head & Neck Surgery  Progress Note    Subjective:     Post-Op Info:  Procedure(s) (LRB):  GLOSSECTOMY (N/A)  TRACHEOTOMY (N/A)  EGD, WITH PEG TUBE INSERTION (N/A)  CREATION, FREE FLAP (Right)  DISSECTION, NECK (Bilateral)   6 Days Post-Op  Hospital Day: 8     Interval History: Stepped down to PCU yesterday evening. Upon entering room, patient found down on floor but awake. Able to nod yes to hitting his head. Rapid response team at bedside. Patient stepped up to the ICU.     Medications:  Continuous Infusions:   sodium chloride 0.9% Stopped (01/06/22 0636)     Scheduled Meds:   acetaminophen  650 mg Per G Tube Q6H    amLODIPine  10 mg Per G Tube Daily    aspirin  81 mg Per G Tube Daily    atorvastatin  20 mg Per G Tube Daily    bacitracin zinc   Topical (Top) BID    docusate  100 mg Per G Tube BID    enoxaparin  40 mg Subcutaneous Daily    folic acid  1 mg Per G Tube Daily    hydrALAZINE  25 mg Per G Tube TID    losartan  100 mg Per G Tube Daily    metoprolol tartrate  200 mg Per G Tube BID    mupirocin   Nasal BID    polyethylene glycol  17 g Per G Tube BID    thiamine  100 mg Per G Tube Daily     PRN Meds:albuterol-ipratropium, bisacodyL, hydrALAZINE, ondansetron, oxyCODONE, oxyCODONE, sodium chloride 0.9%     Review of patient's allergies indicates:  No Known Allergies  Objective:     Vital Signs (24h Range):  Temp:  [96.8 °F (36 °C)-98.1 °F (36.7 °C)] 96.8 °F (36 °C)  Pulse:  [] 94  Resp:  [12-27] 21  SpO2:  [89 %-100 %] 96 %  BP: (108-158)/(60-84) 108/61       Lines/Drains/Airways     Drain                 Gastrostomy/Enterostomy 01/04/22 Percutaneous endoscopic gastrostomy (PEG) LUQ 6 days         Closed/Suction Drain 01/04/22 1244 Right;Anterior Thigh Bulb 15 Fr. 5 days         Closed/Suction Drain 01/04/22 1246 Right;Anterior Thigh Bulb 15 Fr. 5 days         Closed/Suction Drain 01/04/22 1450 Left Neck Bulb 15 Fr. 5 days          Closed/Suction Drain 01/04/22 1450 Right Neck Bulb 15 Fr. 5 days         Closed/Suction Drain 01/04/22 1450 Right Neck Bulb 15 Fr. 5 days          Airway                 Surgical Airway 01/04/22 1546 Shiley Cuffed 5 days          Peripheral Intravenous Line                 Peripheral IV - Single Lumen 01/04/22 0516 18 G Right Antecubital 6 days         Peripheral IV - Single Lumen 01/08/22 0416 22 G Anterior;Distal;Left Upper Arm 2 days         Peripheral IV - Single Lumen 01/09/22 1630 18 G Anterior;Distal;Left Forearm <1 day                Physical Exam    Distressed but awake   NC/AT, EOMI, clear sclera  Normal external nose   MMM, rosemary-tongue ALTFF in place with good color that approximates the thigh, good tugar and warm, strong arterial doppler signal, and good venous signal from implantable doppler   Neck soft, advancement flaps in place without duskiness, staples intact, JPx2 on right with s/s output, RACHEL x 1 on left with s/s output  Right neck swelling stable   6-0 cuffed trach sutured in place, cuff down   Increased WOB, on trach collar   G tube in place    Significant Labs:  CBC:   Recent Labs   Lab 01/04/22  1402 01/10/22  0438 01/10/22  0629   WBC  --  9.65  --    RBC  --  2.85*  --    HGB  --  8.4*  --    HCT   < > 27.1* 26*   PLT  --  321  --    MCV  --  95  --    MCH  --  29.5  --    MCHC  --  31.0*  --     < > = values in this interval not displayed.     CMP:   Recent Labs   Lab 01/04/22  1636 01/05/22  0311 01/10/22  0438   *   < > 117*   CALCIUM 7.9*   < > 9.6   ALBUMIN 2.7*  --   --    PROT 4.5*  --   --    *   < > 135*   K 4.5   < > 4.2   CO2 18*   < > 30*      < > 97   BUN 6*   < > 25*   CREATININE 0.6   < > 0.6   ALKPHOS 43*  --   --    ALT 9*  --   --    AST 17  --   --    BILITOT 1.6*  --   --     < > = values in this interval not displayed.       Significant Diagnostics:  I have reviewed and interpreted all pertinent imaging results/findings within the past 24 hours.   CXR  without change from prior.  CTH negative for bleeding.     Assessment/Plan:     * Squamous cell cancer of tongue  71M with [id number] SCC of the oral tongue s/p total glossectomy, bilateral neck dissections levels I-IV, tracheostomy, and ALTFF reconstruction with G tube placement by general surgery. Right neck swelling stable. Fell this AM. Stepped up to ICU. CTH negative. Aerating well on trach collar now.     ENT   -Okay for q4h flap checks: Record Doppler Pulses (artery and vein) ,Capillary Refill , Color, Turgor, temperature.   -Please label drains carefully and document accordingly  -Bacitracin to all incisions/staple lines  -SLP consult for new tracheostomy and total glossectomy  -Will continue to monitor right neck. Low threshold to restart unasyn    Neuro  -pain control    -scheduled tylenol    -oxycodone/dilaudid PRN     CV  -home medications restarted (amlodipine, metoprolol)   -telemetry   -home atorvastatin  -on ASA. Patient reported not taking blood thinner pre-op. Will clarify if he was taking Plavix. Hold Plavix for now.     Resp  -fresh trach care   -will hold off on trach change given this morning's events. Will change to cuffless once stable for at least 1 day   -trach collar   -trach teaching with respiratory   -continuous pulse ox   -inhalers (fluticasone and tiotropium daily, duonebs PRN)    FENGI  -strict NPO, mIVFs, TFs at goal  -zofran PRN   -colace for bowel regimen     Renal  -CTM, no issues     Heme/ID   -Unasyn x 72 hours, completed   -monitor for signs of infection (aspiration, salivary fistula)     Endocrine   -no issues, monitor glucose     MSK   -PT/OT  -OOB     Ppx: L40, SCDs, NIKITA turnere     Dispo: if stable throughout day, patient can likely step back down to PCU this afternoon     Discussed ENT plan with SICU resident           Annmarie Oliveira MD  Otorhinolaryngology-Head & Neck Surgery  [facility]

## 2022-01-10 NOTE — ASSESSMENT & PLAN NOTE
71M with [id number] SCC of the oral tongue s/p total glossectomy, bilateral neck dissections levels I-IV, tracheostomy, and ALTFF reconstruction with G tube placement by general surgery. Right neck swelling stable. Fell this AM. Stepped up to ICU. CTH negative. Aerating well on trach collar now.     ENT   -Okay for q4h flap checks: Record Doppler Pulses (artery and vein) ,Capillary Refill , Color, Turgor, temperature.   -Please label drains carefully and document accordingly  -Bacitracin to all incisions/staple lines  -SLP consult for new tracheostomy and total glossectomy  -Will continue to monitor right neck. Low threshold to restart unasyn    Neuro  -pain control    -scheduled tylenol    -oxycodone/dilaudid PRN     CV  -home medications restarted (amlodipine, metoprolol)   -telemetry   -home atorvastatin  -on ASA. Patient reported not taking blood thinner pre-op. Will clarify if he was taking Plavix. Hold Plavix for now.     Resp  -fresh trach care   -will hold off on trach change given this morning's events. Will change to cuffless once stable for at least 1 day   -trach collar   -trach teaching with respiratory   -continuous pulse ox   -inhalers (fluticasone and tiotropium daily, duonebs PRN)    FENGI  -strict NPO, mIVFs, TFs at goal  -zofran PRN   -colace for bowel regimen     Renal  -CTM, no issues     Heme/ID   -Unasyn x 72 hours, completed   -monitor for signs of infection (aspiration, salivary fistula)     Endocrine   -no issues, monitor glucose     MSK   -PT/OT  -OOB     Ppx: L40, SCDs, NIKITA hose     Dispo: if stable throughout day, patient can likely step back down to PCU this afternoon     Discussed ENT plan with SICU resident

## 2022-01-10 NOTE — SIGNIFICANT EVENT
RAPID RESPONSE NURSE NOTE        Admit Date: 1/3/2022  LOS: 7  Code Status: Full Code   Date of Consult: 01/10/2022  : 1950  Age: 71 y.o.  Weight:   Wt Readings from Last 1 Encounters:   22 66.9 kg (147 lb 7.8 oz)     Sex: male  Race: White   Bed: 28971 CVICU/29905 CVICU A:   MRN: [id number]  Time Rapid Response Team page Received: 0612  Time Rapid Response Team at Bedside: 0616  Time Rapid Response Team left Bedside: 0645  Was the patient discharged from an ICU this admission? Yes   Was the patient discharged from a PACU within last 24 hours? No   Did the patient receive conscious sedation/general anesthesia in last 24 hours? No  Was the patient in the ED within the past 24 hours? No  Was the patient on NIPPV within the past 24 hours? No   Did this progress into an ARC or CPA: no  Attending Physician: Naeem Smalls MD  Primary Service: Otolaryngology       SITUATION    Notified by code pager.  Reason for alert: hypoxia, patient found on floor  Called to evaluate the patient for Circulatory    BACKGROUND     Why is the patient in the hospital?: Squamous cell cancer of tongue    Patient has a past medical history of Cancer, COPD (chronic obstructive pulmonary disease), Hyperlipidemia, Hypertension, and Pseudoaneurysm.    Last Vitals:  Temp: 98 °F (36.7 °C) (01/10 0400)  Pulse: 99 (01/10 0630)  Resp: 15 (01/10 0156)  BP: 136/67 ( 2300)  SpO2: 96 % (01/10 0630)    24 Hours Vitals Range:  Temp:  [97 °F (36.1 °C)-98.1 °F (36.7 °C)]   Pulse:  []   Resp:  [12-27]   BP: (127-188)/(60-84)   SpO2:  [89 %-100 %]     Labs:  Recent Labs     01/08/22  0323 01/09/22  0317 01/10/22  0629   WBC 9.40 12.05  --    HGB 8.5* 8.9*  --    HCT 27.7* 29.4* 26*    325  --        Recent Labs     22    135*   K 5.0 4.3    98   CO2 34* 31*   CREATININE 0.6 0.6   * 130*   PHOS 4.2 4.7*   MG 2.2 2.1        Recent Labs     01/10/22  0629   PH 7.389   PCO2 49.8*   PO2  58*   HCO3 30.1*   POCSATURATED 89*   BE 5        ASSESSMENT    Physical Exam  Constitutional:       Appearance: He is ill-appearing.   Cardiovascular:      Rate and Rhythm: Tachycardia present.   Pulmonary:      Effort: Respiratory distress present.      Breath sounds: Wheezing present.      Comments: Trach. Thick yellow secretions, productive cough  Musculoskeletal:      Right lower leg: Edema present.      Left lower leg: Edema present.   Skin:     Coloration: Skin is pale.      Findings: Bruising present.      Comments: Multiple bleeding skin tears. Multiple RACHEL drains in place, staples intact to incisions   Neurological:      GCS: GCS eye subscore is 3. GCS verbal subscore is 1. GCS motor subscore is 6.      Comments: Unable to talk, trach patient     Patient found on floor by MD during AM rounds. At least one RACHEL drain disconnected from tubing, blood on floor around patient.   Patient may have hit head during fall, following commands appropriately  SpO2 initially reading 72%    INTERVENTIONS    ABG, CXR  TF turned off  O2 increased  MD at bedside to upgrade patient to ICU for higher level of care    RECOMMENDATIONS    We recommend: Upgrade to SICU for higher level of care    PROVIDER ESCALATION    Orders received and case discussed with Dr. Guzman.    Disposition: Tx in ICU bed [id number].    FOLLOW UP    charge Mychal TRIANA updated on plan of care. Instructed to call the Rapid Response Nurse, Tasha Velarde RN at [phone] for additional questions or concerns.

## 2022-01-10 NOTE — SUBJECTIVE & OBJECTIVE
Follow-up For: Procedure(s) (LRB):  GLOSSECTOMY (N/A)  TRACHEOTOMY (N/A)  EGD, WITH PEG TUBE INSERTION (N/A)  CREATION, FREE FLAP (Right)  DISSECTION, NECK (Bilateral)    Post-Operative Day: 6 Days Post-Op     Past Medical History:   Diagnosis Date    Cancer     skin to Rt forearm and face    COPD (chronic obstructive pulmonary disease)     Hyperlipidemia     Hypertension     Pseudoaneurysm        Past Surgical History:   Procedure Laterality Date    ABDOMINAL SURGERY      stents placed in liver and large intestines, per patient    CAROTID STENT      CORONARY STENT PLACEMENT  01/2000    DISSECTION OF NECK Bilateral 1/4/2022    Procedure: DISSECTION, NECK;  Surgeon: Naeem Smalls MD;  Location: [facility] OR [address] [place] [address];  Service: ENT;  Laterality: Bilateral;    ESOPHAGOGASTRODUODENOSCOPY W/ PEG N/A 1/4/2022    Procedure: EGD, WITH PEG TUBE INSERTION;  Surgeon: Anthony Calabrese MD;  Location: [facility] OR [address] [place] [address];  Service: General;  Laterality: N/A;    EYE SURGERY      Cataract bilateral    femoral stents      bilateral    FLAP PROCEDURE N/A 1/3/2022    Procedure: CREATION, FREE FLAP;  Surgeon: Naeem Smalls MD;  Location: [facility] OR [address] [place] [address];  Service: ENT;  Laterality: N/A;    FLAP PROCEDURE Right 1/4/2022    Procedure: CREATION, FREE FLAP;  Surgeon: Stacey Conde MD;  [facility];  Service: ENT;  Laterality: Right;  Ischemic start 1203  Ischemic stop 1353    GLOSSECTOMY N/A 1/4/2022    Procedure: GLOSSECTOMY;  Surgeon: Naeem Smalls MD;  [facility];  Service: ENT;  Laterality: N/A;    RADICAL NECK DISSECTION Left 1/3/2022    Procedure: DISSECTION, NECK, RADICAL;  Surgeon: Naeem Smalls MD;  [facility];  Service: ENT;  Laterality: Left;    SKIN CANCER EXCISION      TRACHEOTOMY N/A 1/4/2022    Procedure: TRACHEOTOMY;  Surgeon: Naeem Smalls MD;  Location: [facility] OR [address] [place] [address];  Service: ENT;  Laterality: N/A;       Review of patient's  allergies indicates:  No Known Allergies    Family History    None       Tobacco Use    Smoking status: Former Smoker     Packs/day: 2.00     Years: 40.00     Pack years: 80.00     Types: Cigarettes     Start date: 4/17/1963     Quit date: 4/17/2018     Years since quitting: 3.7    Smokeless tobacco: Never Used    Tobacco comment: 3/3 ppd x 40 yrs. Currently 3-4 cigarettes daily .He is trying  to quit. Is using a Vapor cigarettes  2-3 x's a day.   Substance and Sexual Activity    Alcohol use: Yes     Alcohol/week: 3.0 standard drinks     Types: 3 Cans of beer per week     Comment: beer daily 3-4    Drug use: No    Sexual activity: Not Currently      Review of Systems   Constitutional: Negative for chills, diaphoresis, fatigue and fever.   HENT: Positive for trouble swallowing and voice change.         Large mass on left side of jaw and neck   Respiratory: Positive for wheezing. Negative for chest tightness and shortness of breath.    Gastrointestinal: Negative for abdominal pain, nausea and vomiting.   Musculoskeletal: Positive for neck pain (2/2 mass). Negative for neck stiffness.   Skin: Positive for color change and wound.   Neurological: Negative for weakness, light-headedness and numbness.   Psychiatric/Behavioral: Negative for agitation, confusion and hallucinations.     Objective:     Vital Signs (Most Recent):  Temp: 96.8 °F (36 °C) (01/10/22 0701)  Pulse: 94 (01/10/22 0701)  Resp: (!) 21 (01/10/22 0701)  BP: 108/61 (01/10/22 0701)  SpO2: 96 % (01/10/22 0701) Vital Signs (24h Range):  Temp:  [96.8 °F (36 °C)-98.1 °F (36.7 °C)] 96.8 °F (36 °C)  Pulse:  [] 94  Resp:  [12-27] 21  SpO2:  [89 %-100 %] 96 %  BP: (108-188)/(60-84) 108/61     Weight: 66.9 kg (147 lb 7.8 oz)  Body mass index is 22.43 kg/m².      Intake/Output Summary (Last 24 hours) at 1/10/2022 0730  Last data filed at 1/10/2022 0420  Gross per 24 hour   Intake 2572 ml   Output 834.5 ml   Net 1737.5 ml       Physical  Exam  Constitutional:       General: He is in acute distress.      Appearance: He is not toxic-appearing.   HENT:      Head:      Comments: Cervical facial flap soft, appropriate coloring, + triphasic artery  Pulses, Fresh trach CDI     Nose: No congestion.      Mouth/Throat:      Mouth: Mucous membranes are moist.   Eyes:      Extraocular Movements: Extraocular movements intact.      Pupils: Pupils are equal, round, and reactive to light.   Cardiovascular:      Rate and Rhythm: Normal rate and regular rhythm.      Pulses: Normal pulses.      Heart sounds: Normal heart sounds. No murmur heard.      Pulmonary:      Effort: Respiratory distress present.      Breath sounds: Normal breath sounds. No wheezing.      Comments: Trach collar  Abdominal:      General: Abdomen is flat. There is no distension.      Palpations: Abdomen is soft.      Tenderness: There is no abdominal tenderness. There is no guarding.      Comments: G tube in place   Musculoskeletal:         General: No swelling.      Cervical back: Normal range of motion and neck supple. Tenderness present. No rigidity.      Comments: Donor Site: R thigh incision CDI, no signs of hematoma. R DP pulses intact. Drains with minimal SS output   Skin:     General: Skin is warm and dry.      Findings: Lesion present.      Comments: Abrasion left hand, left forearm, left arm.   Neurological:      General: No focal deficit present.      Mental Status: He is alert and oriented to person, place, and time.   Psychiatric:         Judgment: Judgment normal.         Vents:  Oxygen Concentration (%): 35 (01/10/22 0701)    Lines/Drains/Airways     Drain                 Gastrostomy/Enterostomy 01/04/22 Percutaneous endoscopic gastrostomy (PEG) LUQ 6 days         Closed/Suction Drain 01/04/22 1244 Right;Anterior Thigh Bulb 15 Fr. 5 days         Closed/Suction Drain 01/04/22 1246 Right;Anterior Thigh Bulb 15 Fr. 5 days         Closed/Suction Drain 01/04/22 1450 Left Neck Bulb 15  Fr. 5 days         Closed/Suction Drain 01/04/22 1450 Right Neck Bulb 15 Fr. 5 days         Closed/Suction Drain 01/04/22 1450 Right Neck Bulb 15 Fr. 5 days          Airway                 Surgical Airway 01/04/22 1546 Shiley Cuffed 5 days          Peripheral Intravenous Line                 Peripheral IV - Single Lumen 01/04/22 0516 18 G Right Antecubital 6 days         Peripheral IV - Single Lumen 01/08/22 0416 22 G Anterior;Distal;Left Upper Arm 2 days         Peripheral IV - Single Lumen 01/09/22 1630 18 G Anterior;Distal;Left Forearm <1 day                Significant Labs:    CBC/Anemia Profile:  Recent Labs   Lab 01/09/22  0317 01/10/22  0629   WBC 12.05  --    HGB 8.9*  --    HCT 29.4* 26*     --    MCV 98  --    RDW 14.6*  --         Chemistries:  Recent Labs   Lab 01/09/22  0317 01/10/22  0438   * 135*   K 4.3 4.2   CL 98 97   CO2 31* 30*   BUN 23 25*   CREATININE 0.6 0.6   CALCIUM 9.6 9.6   MG 2.1 2.1   PHOS 4.7* 4.8*       ABGs:   Recent Labs   Lab 01/10/22  0629   PH 7.389   PCO2 49.8*   HCO3 30.1*   POCSATURATED 89*   BE 5     CMP:   Recent Labs   Lab 01/09/22  0317 01/10/22  0438   * 135*   K 4.3 4.2   CL 98 97   CO2 31* 30*   * 117*   BUN 23 25*   CREATININE 0.6 0.6   CALCIUM 9.6 9.6   ANIONGAP 6* 8   EGFRNONAA >60.0 >60.0       Significant Imaging: I have reviewed all pertinent imaging results/findings within the past 24 hours.  CXR: I have reviewed all pertinent results/findings within the past 24 hours and my personal findings are:  unchagned from previous CXR 1/5/22. No pneumothorax. Small right pleural effusion. Trach collar in place.

## 2022-01-10 NOTE — PT/OT/SLP DISCHARGE
Physical Therapy Discharge Summary    Name: Fareed Richard Jr.  MRN: [id number]   Principal Problem: Squamous cell cancer of tongue     Patient Discharged from acute Physical Therapy on 1/10/22.  Please refer to prior PT noted date on 22 for functional status.     Assessment:     T/f to ICU    Objective:     GOALS:   Multidisciplinary Problems     Physical Therapy Goals        Problem: Physical Therapy Goal    Goal Priority Disciplines Outcome Goal Variances Interventions   Physical Therapy Goal     PT, PT/OT Ongoing, Progressing     Description: Goals to be met by: 2022     Patient will increase functional independence with mobility by performin. Sit to stand transfer with Supervision  2. Bed to chair transfer with Supervision using LRAD  3. Gait  x 150 feet with Supervision using LRAD.   4. Ascend/descend 3 stair with right Handrails Stand-by Assistance using no AD.   5. Lower extremity exercise program x30 reps per handout, with independence                     Reasons for Discontinuation of Therapy Services  T/f to ICU. Will require new PT orders when appropriate      Plan:     Patient Discharged to: ICU.      1/10/2022

## 2022-01-10 NOTE — PLAN OF CARE
A&Ox4. VVS on 8L TC. Trach #6 shiley, no ties per order. Adequate UOP per bed pan. Pain managed with PRN medications. Q8 CIWA performed upon arrival to floor. Skin assessment performed upon arrival to floor skin tears to right forearm and left shoulder blade placed in LDA. R. Thigh with staples x2 Rachel's intact. Neck bilateral incision with staples intact, left side mass, 1x Rachel drain to bulb suction. Right neck with x2 RACHEL drains to bulb suction. Flap checks Q2 hr maintained, doppler with strong pulses. Bed in low position, call light in reach. WCTM.

## 2022-01-10 NOTE — ASSESSMENT & PLAN NOTE
Fareed Richard . is a 71 y.o. M with hx of squamous cell carcinoma, HTN, CAD s/p stents, carotid stenosis s/p carotid endarterectomy, PAD s/p b/l femoral atherectomies and other LE interventions who was admitted to SICU and will undergo glossectomy and face/neck reconstruction with ENT on 1/4/21.    Neuro/Psych:  - No sedation  - Pain previously controlled: scheduled tylenol, oxycodone/dilaudid prn   #alcohol use  - patient reports drinking 3-4 beers per night, last drink was 1/1/22  - Thiamine/Folate  - CIWA protocol in place if assessment necessary    CV  #CAD s/p stents  #Carotid stenosis s/p endarterectomy  #PVS s/p multiple LE interventions including b/l femoral arthetectomies  - continue atorv, ASA and holding plavix    #HTN  - Home meds: continue Amlodipine 10, Metoprolol 200 BID, Losartan 100, Hydralazine 25 TID  - Metoprolol 200 BID since 1/9/22, HTN better controlled     Pulm  #COPD  #Former smoker  - venotlin inhaler  - duonebs prn    #Tracheostomy 1/4  - fresh trach, well healing  - on trach collar 8L / 35%humidified O2, wean as tolerated    GI  - Nutrition consult  -F: mIVFs off, stop 250 FW flushes  -E: replete prn  -N: TF 50 ml/hr, goal  - Continue bowel regimen, received suppository 1/9/22 pm    Renal  No active issues  - BUN/Cr 25/0.6  - UOp adequate    Heme/Onc  #Squamous Cell Carcinoma of tongue   #s/p total glossectomy, tracheostomy and free flap reconstruction  - q2h flap checks, arterial doppler without issue    ID  - On Unasyn per primary  - Stable WBC    Endo  - no active issues         PPx:   Feeding: NPO, tube feeds held since time of fall. Will restart today.  Analgesia/Sedation: tylenol, oxycodone/dilaudid prn / none  Thromboembolic prevention: lovenox  HOB >30: yes  Stress Ulcer ppx: no  Glucose control: Critical care goal 140-180 g/dl, ISS    Lines/Drains/Airway: trach, PIV, RACHEL x 5:  2 right neck, 1 left neck and 2 right leg, PEG      Dispo/Code Status/Palliative:   -- Continue SICU  care / Full Code / Will consider stepdown per primary

## 2022-01-10 NOTE — SUBJECTIVE & OBJECTIVE
Interval History: Stepped down to PCU yesterday evening. Upon entering room, patient found down on floor but awake. Able to nod yes to hitting his head. Rapid response team at bedside. Patient stepped up to the ICU.     Medications:  Continuous Infusions:   sodium chloride 0.9% Stopped (01/06/22 0636)     Scheduled Meds:   acetaminophen  650 mg Per G Tube Q6H    amLODIPine  10 mg Per G Tube Daily    aspirin  81 mg Per G Tube Daily    atorvastatin  20 mg Per G Tube Daily    bacitracin zinc   Topical (Top) BID    docusate  100 mg Per G Tube BID    enoxaparin  40 mg Subcutaneous Daily    folic acid  1 mg Per G Tube Daily    hydrALAZINE  25 mg Per G Tube TID    losartan  100 mg Per G Tube Daily    metoprolol tartrate  200 mg Per G Tube BID    mupirocin   Nasal BID    polyethylene glycol  17 g Per G Tube BID    thiamine  100 mg Per G Tube Daily     PRN Meds:albuterol-ipratropium, bisacodyL, hydrALAZINE, ondansetron, oxyCODONE, oxyCODONE, sodium chloride 0.9%     Review of patient's allergies indicates:  No Known Allergies  Objective:     Vital Signs (24h Range):  Temp:  [96.8 °F (36 °C)-98.1 °F (36.7 °C)] 96.8 °F (36 °C)  Pulse:  [] 94  Resp:  [12-27] 21  SpO2:  [89 %-100 %] 96 %  BP: (108-158)/(60-84) 108/61       Lines/Drains/Airways     Drain                 Gastrostomy/Enterostomy 01/04/22 Percutaneous endoscopic gastrostomy (PEG) LUQ 6 days         Closed/Suction Drain 01/04/22 1244 Right;Anterior Thigh Bulb 15 Fr. 5 days         Closed/Suction Drain 01/04/22 1246 Right;Anterior Thigh Bulb 15 Fr. 5 days         Closed/Suction Drain 01/04/22 1450 Left Neck Bulb 15 Fr. 5 days         Closed/Suction Drain 01/04/22 1450 Right Neck Bulb 15 Fr. 5 days         Closed/Suction Drain 01/04/22 1450 Right Neck Bulb 15 Fr. 5 days          Airway                 Surgical Airway 01/04/22 1546 Shiley Cuffed 5 days          Peripheral Intravenous Line                 Peripheral IV - Single Lumen 01/04/22 0516 18  G Right Antecubital 6 days         Peripheral IV - Single Lumen 01/08/22 0416 22 G Anterior;Distal;Left Upper Arm 2 days         Peripheral IV - Single Lumen 01/09/22 1630 18 G Anterior;Distal;Left Forearm <1 day                Physical Exam    Distressed but awake   NC/AT, EOMI, clear sclera  Normal external nose   MMM, rosemary-tongue ALTFF in place with good color that approximates the thigh, good tugar and warm, strong arterial doppler signal, and good venous signal from implantable doppler   Neck soft, advancement flaps in place without duskiness, staples intact, JPx2 on right with s/s output, RACHLE x 1 on left with s/s output  Right neck swelling stable   6-0 cuffed trach sutured in place, cuff down   Increased WOB, on trach collar   G tube in place    Significant Labs:  CBC:   Recent Labs   Lab 01/04/22  1402 01/10/22  0438 01/10/22  0629   WBC  --  9.65  --    RBC  --  2.85*  --    HGB  --  8.4*  --    HCT   < > 27.1* 26*   PLT  --  321  --    MCV  --  95  --    MCH  --  29.5  --    MCHC  --  31.0*  --     < > = values in this interval not displayed.     CMP:   Recent Labs   Lab 01/04/22  1636 01/05/22  0311 01/10/22  0438   *   < > 117*   CALCIUM 7.9*   < > 9.6   ALBUMIN 2.7*  --   --    PROT 4.5*  --   --    *   < > 135*   K 4.5   < > 4.2   CO2 18*   < > 30*      < > 97   BUN 6*   < > 25*   CREATININE 0.6   < > 0.6   ALKPHOS 43*  --   --    ALT 9*  --   --    AST 17  --   --    BILITOT 1.6*  --   --     < > = values in this interval not displayed.       Significant Diagnostics:  I have reviewed and interpreted all pertinent imaging results/findings within the past 24 hours.   CXR without change from prior.  CTH negative for bleeding.

## 2022-01-10 NOTE — H&P
[facility]  Critical Care - Surgery  History & Physical    Patient Name: Fareed Richard Jr.  MRN: [id number]  Admission Date: 1/3/2022  Code Status: Full Code  Attending Physician: Naeem Smalls MD   Primary Care Provider: Kodi Tubbs MD   Principal Problem: Squamous cell cancer of tongue    Subjective:     HPI:  Fareed Richard Jr. is a 71 y.o. M with hx of squamous cell carcinoma, HTN, CAD s/p stents, carotid stenosis s/p carotid endarterectomy in 2018, PAD s/p b/l femoral atherectomies and other LE interventions who presented today for a surgical removal of mass and face/neck reconstruction surgery with ENT.  The patient procedure was postponed til tomorrow 1/4/21 due to OR delays.  The patient was admitted to the SICU with plans for surgery tomorrow.    Interval history:  Patient was stepped down 1/9/22 to the floor. During am ENT rounds on 1/10/22 patient was found down with blood on the floor. Patient was down for an unknown amount of time. New abrasions noted on left hand, left forearm and left lateral arm. Patient reported hitting his head. He was aaox3. Flap assessed to be unchanged from previous exam.  ABG revealed PO2 58. Stat CT Head without contrast revealed no acute intracranial process. EKG ordered.      Hospital/ICU Course:  Fareed Richard Jr. is a 71 y.o. M with hx of squamous cell carcinoma, HTN, CAD s/p stents, carotid stenosis s/p carotid endarterectomy in 2018, PAD s/p b/l femoral atherectomies and other LE interventions s/p total glossectomy, tracheostomy and free flap reconstruction with ENT on 1/4.  The patient has been doing well since surgery, requiring limited pain control.      Follow-up For: Procedure(s) (LRB):  GLOSSECTOMY (N/A)  TRACHEOTOMY (N/A)  EGD, WITH PEG TUBE INSERTION (N/A)  CREATION, FREE FLAP (Right)  DISSECTION, NECK (Bilateral)    Post-Operative Day: 6 Days Post-Op     Past Medical History:   Diagnosis Date    Cancer     skin to Rt forearm  and face    COPD (chronic obstructive pulmonary disease)     Hyperlipidemia     Hypertension     Pseudoaneurysm        Past Surgical History:   Procedure Laterality Date    ABDOMINAL SURGERY      stents placed in liver and large intestines, per patient    CAROTID STENT      CORONARY STENT PLACEMENT  01/2000    DISSECTION OF NECK Bilateral 1/4/2022    Procedure: DISSECTION, NECK;  Surgeon: Naeem Smalls MD;  Location: [facility] OR [address] [place] [address];  Service: ENT;  Laterality: Bilateral;    ESOPHAGOGASTRODUODENOSCOPY W/ PEG N/A 1/4/2022    Procedure: EGD, WITH PEG TUBE INSERTION;  Surgeon: Anthony Calabrese MD;  [facility];  Service: General;  Laterality: N/A;    EYE SURGERY      Cataract bilateral    femoral stents      bilateral    FLAP PROCEDURE N/A 1/3/2022    Procedure: CREATION, FREE FLAP;  Surgeon: Naeem Smalls MD;  Location: [facility] OR [address] [place] [address];  Service: ENT;  Laterality: N/A;    FLAP PROCEDURE Right 1/4/2022    Procedure: CREATION, FREE FLAP;  Surgeon: Stacey Conde MD;  [facility];  Service: ENT;  Laterality: Right;  Ischemic start 1203  Ischemic stop 1353    GLOSSECTOMY N/A 1/4/2022    Procedure: GLOSSECTOMY;  Surgeon: Naeem Smalls MD;  Location: [facility] OR [address] [place] [address];  Service: ENT;  Laterality: N/A;    RADICAL NECK DISSECTION Left 1/3/2022    Procedure: DISSECTION, NECK, RADICAL;  Surgeon: Naeem Smalls MD;  Location: [facility] OR [address] [place] [address];  Service: ENT;  Laterality: Left;    SKIN CANCER EXCISION      TRACHEOTOMY N/A 1/4/2022    Procedure: TRACHEOTOMY;  Surgeon: Naeem Smalls MD;  Location: [facility] OR [address] [place] [address];  Service: ENT;  Laterality: N/A;       Review of patient's allergies indicates:  No Known Allergies    Family History    None       Tobacco Use    Smoking status: Former Smoker     Packs/day: 2.00     Years: 40.00     Pack years: 80.00     Types: Cigarettes     Start date: 4/17/1963     Quit date: 4/17/2018     Years since quitting: 3.7     Smokeless tobacco: Never Used    Tobacco comment: 3/3 ppd x 40 yrs. Currently 3-4 cigarettes daily .He is trying  to quit. Is using a Vapor cigarettes  2-3 x's a day.   Substance and Sexual Activity    Alcohol use: Yes     Alcohol/week: 3.0 standard drinks     Types: 3 Cans of beer per week     Comment: beer daily 3-4    Drug use: No    Sexual activity: Not Currently      Review of Systems   Constitutional: Negative for chills, diaphoresis, fatigue and fever.   HENT: Positive for trouble swallowing and voice change.         Large mass on left side of jaw and neck   Respiratory: Positive for wheezing. Negative for chest tightness and shortness of breath.    Gastrointestinal: Negative for abdominal pain, nausea and vomiting.   Musculoskeletal: Positive for neck pain (2/2 mass). Negative for neck stiffness.   Skin: Positive for color change and wound.   Neurological: Negative for weakness, light-headedness and numbness.   Psychiatric/Behavioral: Negative for agitation, confusion and hallucinations.     Objective:     Vital Signs (Most Recent):  Temp: 96.8 °F (36 °C) (01/10/22 0701)  Pulse: 94 (01/10/22 0701)  Resp: (!) 21 (01/10/22 0701)  BP: 108/61 (01/10/22 0701)  SpO2: 96 % (01/10/22 0701) Vital Signs (24h Range):  Temp:  [96.8 °F (36 °C)-98.1 °F (36.7 °C)] 96.8 °F (36 °C)  Pulse:  [] 94  Resp:  [12-27] 21  SpO2:  [89 %-100 %] 96 %  BP: (108-188)/(60-84) 108/61     Weight: 66.9 kg (147 lb 7.8 oz)  Body mass index is 22.43 kg/m².      Intake/Output Summary (Last 24 hours) at 1/10/2022 0730  Last data filed at 1/10/2022 0420  Gross per 24 hour   Intake 2572 ml   Output 834.5 ml   Net 1737.5 ml       Physical Exam  Constitutional:       General: He is in acute distress.      Appearance: He is not toxic-appearing.   HENT:      Head:      Comments: Cervical facial flap soft, appropriate coloring, + triphasic artery  Pulses, Fresh trach CDI     Nose: No congestion.      Mouth/Throat:      Mouth: Mucous membranes  are moist.   Eyes:      Extraocular Movements: Extraocular movements intact.      Pupils: Pupils are equal, round, and reactive to light.   Cardiovascular:      Rate and Rhythm: Normal rate and regular rhythm.      Pulses: Normal pulses.      Heart sounds: Normal heart sounds. No murmur heard.      Pulmonary:      Effort: Respiratory distress present.      Breath sounds: Normal breath sounds. No wheezing.      Comments: Trach collar  Abdominal:      General: Abdomen is flat. There is no distension.      Palpations: Abdomen is soft.      Tenderness: There is no abdominal tenderness. There is no guarding.      Comments: G tube in place   Musculoskeletal:         General: No swelling.      Cervical back: Normal range of motion and neck supple. Tenderness present. No rigidity.      Comments: Donor Site: R thigh incision CDI, no signs of hematoma. R DP pulses intact. Drains with minimal SS output   Skin:     General: Skin is warm and dry.      Findings: Lesion present.      Comments: Abrasion left hand, left forearm, left arm.   Neurological:      General: No focal deficit present.      Mental Status: He is alert and oriented to person, place, and time.   Psychiatric:         Judgment: Judgment normal.         Vents:  Oxygen Concentration (%): 35 (01/10/22 0701)    Lines/Drains/Airways     Drain                 Gastrostomy/Enterostomy 01/04/22 Percutaneous endoscopic gastrostomy (PEG) LUQ 6 days         Closed/Suction Drain 01/04/22 1244 Right;Anterior Thigh Bulb 15 Fr. 5 days         Closed/Suction Drain 01/04/22 1246 Right;Anterior Thigh Bulb 15 Fr. 5 days         Closed/Suction Drain 01/04/22 1450 Left Neck Bulb 15 Fr. 5 days         Closed/Suction Drain 01/04/22 1450 Right Neck Bulb 15 Fr. 5 days         Closed/Suction Drain 01/04/22 1450 Right Neck Bulb 15 Fr. 5 days          Airway                 Surgical Airway 01/04/22 1546 Shiley Cuffed 5 days          Peripheral Intravenous Line                 Peripheral IV -  Single Lumen 01/04/22 0516 18 G Right Antecubital 6 days         Peripheral IV - Single Lumen 01/08/22 0416 22 G Anterior;Distal;Left Upper Arm 2 days         Peripheral IV - Single Lumen 01/09/22 1630 18 G Anterior;Distal;Left Forearm <1 day                Significant Labs:    CBC/Anemia Profile:  Recent Labs   Lab 01/09/22  0317 01/10/22  0629   WBC 12.05  --    HGB 8.9*  --    HCT 29.4* 26*     --    MCV 98  --    RDW 14.6*  --         Chemistries:  Recent Labs   Lab 01/09/22  0317 01/10/22  0438   * 135*   K 4.3 4.2   CL 98 97   CO2 31* 30*   BUN 23 25*   CREATININE 0.6 0.6   CALCIUM 9.6 9.6   MG 2.1 2.1   PHOS 4.7* 4.8*       ABGs:   Recent Labs   Lab 01/10/22  0629   PH 7.389   PCO2 49.8*   HCO3 30.1*   POCSATURATED 89*   BE 5     CMP:   Recent Labs   Lab 01/09/22  0317 01/10/22  0438   * 135*   K 4.3 4.2   CL 98 97   CO2 31* 30*   * 117*   BUN 23 25*   CREATININE 0.6 0.6   CALCIUM 9.6 9.6   ANIONGAP 6* 8   EGFRNONAA >60.0 >60.0       Significant Imaging: I have reviewed all pertinent imaging results/findings within the past 24 hours.  CXR: I have reviewed all pertinent results/findings within the past 24 hours and my personal findings are:  unchagned from previous CXR 1/5/22. No pneumothorax. Small right pleural effusion. Trach collar in place.    Assessment/Plan:     * Squamous cell cancer of tongue  Fareed Richard Jr. is a 71 y.o. M with hx of squamous cell carcinoma, HTN, CAD s/p stents, carotid stenosis s/p carotid endarterectomy, PAD s/p b/l femoral atherectomies and other LE interventions who was admitted to SICU and will undergo glossectomy and face/neck reconstruction with ENT on 1/4/21.    Neuro/Psych:  - No sedation  - Pain previously controlled: scheduled tylenol, oxycodone/dilaudid prn   #alcohol use  - patient reports drinking 3-4 beers per night, last drink was 1/1/22  - Thiamine/Folate  - CIWA protocol in place if assessment necessary    CV  #CAD s/p stents  #Carotid  stenosis s/p endarterectomy  #PVS s/p multiple LE interventions including b/l femoral arthetectomies  - continue atorv, ASA and holding plavix    #HTN  - Home meds: continue Amlodipine 10, Metoprolol 200 BID, Losartan 100, Hydralazine 25 TID  - Metoprolol 200 BID since 1/9/22, HTN better controlled     Pulm  #COPD  #Former smoker  - venotlin inhaler  - duonebs prn    #Tracheostomy 1/4  - fresh trach, well healing  - on trach collar 8L / 35%humidified O2, wean as tolerated    GI  - Nutrition consult  -F: mIVFs off, stop 250 FW flushes  -E: replete prn  -N: TF 50 ml/hr, goal  - Continue bowel regimen, received suppository 1/9/22 pm    Renal  No active issues  - BUN/Cr 25/0.6  - UOp adequate    Heme/Onc  #Squamous Cell Carcinoma of tongue   #s/p total glossectomy, tracheostomy and free flap reconstruction  - q2h flap checks, arterial doppler without issue    ID  - On Unasyn per primary  - Stable WBC    Endo  - no active issues         PPx:   Feeding: NPO, tube feeds held since time of fall. Will restart today.  Analgesia/Sedation: tylenol, oxycodone/dilaudid prn / none  Thromboembolic prevention: lovenox  HOB >30: yes  Stress Ulcer ppx: no  Glucose control: Critical care goal 140-180 g/dl, ISS    Lines/Drains/Airway: trach, PIV, RACHEL x 5:  2 right neck, 1 left neck and 2 right leg, PEG      Dispo/Code Status/Palliative:   -- Continue SICU care / Full Code / Will consider stepdown per primary          Critical secondary to Patient has an abrupt change in neurologic status: fall, found down for unknown amount of time.     Critical care was time spent personally by me on the following activities: development of treatment plan with patient or surrogate and bedside caregivers, discussions with consultants, evaluation of patient's response to treatment, examination of patient, ordering and performing treatments and interventions, ordering and review of laboratory studies, ordering and review of radiographic studies, pulse  oximetry, re-evaluation of patient's condition.  This critical care time did not overlap with that of any other provider or involve time for any procedures.     Jabari Bermudez MD  Critical Care - Surgery  [facility]

## 2022-01-10 NOTE — PLAN OF CARE
POC reviewed with patient, understanding acknowledged via written communication and nods as he has #6 deflated Shiley trach in place. Pt ANOx4, VSS. O2 hard to maintain >90, titrating O2 anywhere from 6L 35% to 10L 80%. He required a 1x dose of ativan for respiratory distress induced by anxiety, after which his respiratory rate evened and RN was able to titrate O2 requirements back down.     Skin:  - B/L neck and R thigh with staples both NELLY, cleaned and bacitracin applied.  - 5 RACHEL drains present: 2 to the R thigh, 2 to the R neck, 1 to the L neck. All output serosanguineous, see flowsheet for values.   - RFA and L shoulder blade skin tears both covered with mepelex foam.  - LUQ PEG tube receiving all meds, Impact Peptide 1.5 @ 50, and Q8 250 FWF.    Pain managed with PRN oxy. Dribbling clear yellow urine per urinal. 1 small BM at start of shift. SCD's in use. Turned Q2 to promote skin integrity. Q2 flap and neurovasc checks; Q8 CIWA scaling. Tongue flap strong via Doppler. Pt able to make needs known and remains free of fall or injury as safety measures intact. Will continue to monitor.          Problem: Infection  Goal: Absence of Infection Signs and Symptoms  Outcome: Ongoing, Progressing     Problem: Adult Inpatient Plan of Care  Goal: Optimal Comfort and Wellbeing  Outcome: Ongoing, Progressing

## 2022-01-11 LAB
ANION GAP SERPL CALC-SCNC: 10 MMOL/L (ref 8–16)
BASOPHILS # BLD AUTO: 0.04 K/UL (ref 0–0.2)
BASOPHILS NFR BLD: 0.5 % (ref 0–1.9)
BUN SERPL-MCNC: 23 MG/DL (ref 8–23)
CALCIUM SERPL-MCNC: 9.3 MG/DL (ref 8.7–10.5)
CHLORIDE SERPL-SCNC: 98 MMOL/L (ref 95–110)
CO2 SERPL-SCNC: 27 MMOL/L (ref 23–29)
CREAT SERPL-MCNC: 0.6 MG/DL (ref 0.5–1.4)
DIFFERENTIAL METHOD: ABNORMAL
EOSINOPHIL # BLD AUTO: 0.3 K/UL (ref 0–0.5)
EOSINOPHIL NFR BLD: 3.9 % (ref 0–8)
ERYTHROCYTE [DISTWIDTH] IN BLOOD BY AUTOMATED COUNT: 14.6 % (ref 11.5–14.5)
EST. GFR  (AFRICAN AMERICAN): >60 ML/MIN/1.73 M^2
EST. GFR  (NON AFRICAN AMERICAN): >60 ML/MIN/1.73 M^2
GLUCOSE SERPL-MCNC: 149 MG/DL (ref 70–110)
HCT VFR BLD AUTO: 25 % (ref 40–54)
HGB BLD-MCNC: 7.8 G/DL (ref 14–18)
IMM GRANULOCYTES # BLD AUTO: 0.07 K/UL (ref 0–0.04)
IMM GRANULOCYTES NFR BLD AUTO: 0.9 % (ref 0–0.5)
LYMPHOCYTES # BLD AUTO: 0.5 K/UL (ref 1–4.8)
LYMPHOCYTES NFR BLD: 6.9 % (ref 18–48)
MAGNESIUM SERPL-MCNC: 2.1 MG/DL (ref 1.6–2.6)
MCH RBC QN AUTO: 29.8 PG (ref 27–31)
MCHC RBC AUTO-ENTMCNC: 31.2 G/DL (ref 32–36)
MCV RBC AUTO: 95 FL (ref 82–98)
MONOCYTES # BLD AUTO: 1.1 K/UL (ref 0.3–1)
MONOCYTES NFR BLD: 13.7 % (ref 4–15)
NEUTROPHILS # BLD AUTO: 5.8 K/UL (ref 1.8–7.7)
NEUTROPHILS NFR BLD: 74.1 % (ref 38–73)
NRBC BLD-RTO: 0 /100 WBC
PHOSPHATE SERPL-MCNC: 4.9 MG/DL (ref 2.7–4.5)
PLATELET # BLD AUTO: 298 K/UL (ref 150–450)
PMV BLD AUTO: 9.1 FL (ref 9.2–12.9)
POTASSIUM SERPL-SCNC: 3.9 MMOL/L (ref 3.5–5.1)
RBC # BLD AUTO: 2.62 M/UL (ref 4.6–6.2)
SODIUM SERPL-SCNC: 135 MMOL/L (ref 136–145)
WBC # BLD AUTO: 7.86 K/UL (ref 3.9–12.7)

## 2022-01-11 PROCEDURE — 85025 COMPLETE CBC W/AUTO DIFF WBC: CPT | Performed by: STUDENT IN AN ORGANIZED HEALTH CARE EDUCATION/TRAINING PROGRAM

## 2022-01-11 PROCEDURE — 94640 AIRWAY INHALATION TREATMENT: CPT

## 2022-01-11 PROCEDURE — 25000003 PHARM REV CODE 250

## 2022-01-11 PROCEDURE — 63600175 PHARM REV CODE 636 W HCPCS: Performed by: STUDENT IN AN ORGANIZED HEALTH CARE EDUCATION/TRAINING PROGRAM

## 2022-01-11 PROCEDURE — 25000003 PHARM REV CODE 250: Performed by: OTOLARYNGOLOGY

## 2022-01-11 PROCEDURE — 83735 ASSAY OF MAGNESIUM: CPT | Performed by: STUDENT IN AN ORGANIZED HEALTH CARE EDUCATION/TRAINING PROGRAM

## 2022-01-11 PROCEDURE — 25000003 PHARM REV CODE 250: Performed by: STUDENT IN AN ORGANIZED HEALTH CARE EDUCATION/TRAINING PROGRAM

## 2022-01-11 PROCEDURE — 99233 SBSQ HOSP IP/OBS HIGH 50: CPT | Mod: ,,, | Performed by: STUDENT IN AN ORGANIZED HEALTH CARE EDUCATION/TRAINING PROGRAM

## 2022-01-11 PROCEDURE — 99900035 HC TECH TIME PER 15 MIN (STAT)

## 2022-01-11 PROCEDURE — 80048 BASIC METABOLIC PNL TOTAL CA: CPT | Performed by: STUDENT IN AN ORGANIZED HEALTH CARE EDUCATION/TRAINING PROGRAM

## 2022-01-11 PROCEDURE — 94761 N-INVAS EAR/PLS OXIMETRY MLT: CPT

## 2022-01-11 PROCEDURE — 25000242 PHARM REV CODE 250 ALT 637 W/ HCPCS

## 2022-01-11 PROCEDURE — 84100 ASSAY OF PHOSPHORUS: CPT | Performed by: STUDENT IN AN ORGANIZED HEALTH CARE EDUCATION/TRAINING PROGRAM

## 2022-01-11 PROCEDURE — 97535 SELF CARE MNGMENT TRAINING: CPT

## 2022-01-11 PROCEDURE — 97168 OT RE-EVAL EST PLAN CARE: CPT

## 2022-01-11 PROCEDURE — 27201112

## 2022-01-11 PROCEDURE — 97116 GAIT TRAINING THERAPY: CPT

## 2022-01-11 PROCEDURE — 99900026 HC AIRWAY MAINTENANCE (STAT)

## 2022-01-11 PROCEDURE — 25000242 PHARM REV CODE 250 ALT 637 W/ HCPCS: Performed by: STUDENT IN AN ORGANIZED HEALTH CARE EDUCATION/TRAINING PROGRAM

## 2022-01-11 PROCEDURE — 94760 N-INVAS EAR/PLS OXIMETRY 1: CPT

## 2022-01-11 PROCEDURE — 92507 TX SP LANG VOICE COMM INDIV: CPT

## 2022-01-11 PROCEDURE — 27200966 HC CLOSED SUCTION SYSTEM

## 2022-01-11 PROCEDURE — 20000000 HC ICU ROOM

## 2022-01-11 PROCEDURE — 99233 PR SUBSEQUENT HOSPITAL CARE,LEVL III: ICD-10-PCS | Mod: ,,, | Performed by: STUDENT IN AN ORGANIZED HEALTH CARE EDUCATION/TRAINING PROGRAM

## 2022-01-11 PROCEDURE — 97530 THERAPEUTIC ACTIVITIES: CPT

## 2022-01-11 PROCEDURE — 97164 PT RE-EVAL EST PLAN CARE: CPT

## 2022-01-11 PROCEDURE — 27000221 HC OXYGEN, UP TO 24 HOURS

## 2022-01-11 RX ORDER — SILVER NITRATE 38.21; 12.74 MG/1; MG/1
3 STICK TOPICAL ONCE
Status: COMPLETED | OUTPATIENT
Start: 2022-01-11 | End: 2022-01-11

## 2022-01-11 RX ORDER — HYDROMORPHONE HYDROCHLORIDE 1 MG/ML
0.5 INJECTION, SOLUTION INTRAMUSCULAR; INTRAVENOUS; SUBCUTANEOUS ONCE
Status: COMPLETED | OUTPATIENT
Start: 2022-01-11 | End: 2022-01-11

## 2022-01-11 RX ORDER — LOSARTAN POTASSIUM 50 MG/1
50 TABLET ORAL DAILY
Status: CANCELLED | OUTPATIENT
Start: 2022-01-11

## 2022-01-11 RX ORDER — CLOPIDOGREL BISULFATE 75 MG/1
75 TABLET ORAL DAILY
Status: DISCONTINUED | OUTPATIENT
Start: 2022-01-11 | End: 2022-01-25 | Stop reason: HOSPADM

## 2022-01-11 RX ORDER — LOSARTAN POTASSIUM 50 MG/1
50 TABLET ORAL DAILY
Status: DISCONTINUED | OUTPATIENT
Start: 2022-01-11 | End: 2022-01-25 | Stop reason: HOSPADM

## 2022-01-11 RX ADMIN — POLYETHYLENE GLYCOL 3350 17 G: 17 POWDER, FOR SOLUTION ORAL at 08:01

## 2022-01-11 RX ADMIN — BACITRACIN 1 EACH: 500 OINTMENT TOPICAL at 09:01

## 2022-01-11 RX ADMIN — METOPROLOL TARTRATE 200 MG: 50 TABLET, FILM COATED ORAL at 08:01

## 2022-01-11 RX ADMIN — OXYCODONE HYDROCHLORIDE 5 MG: 5 SOLUTION ORAL at 03:01

## 2022-01-11 RX ADMIN — ACETYLCYSTEINE 4 ML: 100 INHALANT RESPIRATORY (INHALATION) at 07:01

## 2022-01-11 RX ADMIN — ATORVASTATIN CALCIUM 20 MG: 20 TABLET, FILM COATED ORAL at 09:01

## 2022-01-11 RX ADMIN — ACETAMINOPHEN 650 MG: 325 TABLET ORAL at 11:01

## 2022-01-11 RX ADMIN — OXYCODONE HYDROCHLORIDE 5 MG: 5 SOLUTION ORAL at 06:01

## 2022-01-11 RX ADMIN — POLYETHYLENE GLYCOL 3350 17 G: 17 POWDER, FOR SOLUTION ORAL at 09:01

## 2022-01-11 RX ADMIN — AMLODIPINE BESYLATE 10 MG: 10 TABLET ORAL at 09:01

## 2022-01-11 RX ADMIN — SILVER NITRATE APPLICATORS 3 APPLICATOR: 25; 75 STICK TOPICAL at 08:01

## 2022-01-11 RX ADMIN — IPRATROPIUM BROMIDE AND ALBUTEROL SULFATE 3 ML: 2.5; .5 SOLUTION RESPIRATORY (INHALATION) at 07:01

## 2022-01-11 RX ADMIN — METOPROLOL TARTRATE 200 MG: 50 TABLET, FILM COATED ORAL at 09:01

## 2022-01-11 RX ADMIN — IPRATROPIUM BROMIDE AND ALBUTEROL SULFATE 3 ML: 2.5; .5 SOLUTION RESPIRATORY (INHALATION) at 02:01

## 2022-01-11 RX ADMIN — OXYCODONE HYDROCHLORIDE 5 MG: 5 SOLUTION ORAL at 09:01

## 2022-01-11 RX ADMIN — LOSARTAN POTASSIUM 50 MG: 50 TABLET, FILM COATED ORAL at 09:01

## 2022-01-11 RX ADMIN — DOCUSATE SODIUM 100 MG: 50 LIQUID ORAL at 09:01

## 2022-01-11 RX ADMIN — HYDROMORPHONE HYDROCHLORIDE 0.5 MG: 1 INJECTION, SOLUTION INTRAMUSCULAR; INTRAVENOUS; SUBCUTANEOUS at 08:01

## 2022-01-11 RX ADMIN — DOCUSATE SODIUM 100 MG: 50 LIQUID ORAL at 08:01

## 2022-01-11 RX ADMIN — ACETAMINOPHEN 650 MG: 325 TABLET ORAL at 01:01

## 2022-01-11 RX ADMIN — ACETAMINOPHEN 650 MG: 325 TABLET ORAL at 05:01

## 2022-01-11 RX ADMIN — ACETYLCYSTEINE 4 ML: 100 INHALANT RESPIRATORY (INHALATION) at 02:01

## 2022-01-11 RX ADMIN — CLOPIDOGREL 75 MG: 75 TABLET, FILM COATED ORAL at 09:01

## 2022-01-11 RX ADMIN — MUPIROCIN: 20 OINTMENT TOPICAL at 09:01

## 2022-01-11 RX ADMIN — THIAMINE HCL TAB 100 MG 100 MG: 100 TAB at 09:01

## 2022-01-11 RX ADMIN — ENOXAPARIN SODIUM 40 MG: 40 INJECTION SUBCUTANEOUS at 05:01

## 2022-01-11 RX ADMIN — BACITRACIN 1 EACH: 500 OINTMENT TOPICAL at 08:01

## 2022-01-11 RX ADMIN — ASPIRIN 81 MG CHEWABLE TABLET 81 MG: 81 TABLET CHEWABLE at 09:01

## 2022-01-11 RX ADMIN — MUPIROCIN: 20 OINTMENT TOPICAL at 08:01

## 2022-01-11 RX ADMIN — FOLIC ACID 1 MG: 1 TABLET ORAL at 09:01

## 2022-01-11 NOTE — ASSESSMENT & PLAN NOTE
Fareed Richard . is a 71 y.o. M with hx of squamous cell carcinoma, HTN, CAD s/p stents, carotid stenosis s/p carotid endarterectomy, PAD s/p b/l femoral atherectomies and other LE interventions who was admitted to SICU and will undergo glossectomy and face/neck reconstruction with ENT on 1/4/21.    Neuro/Psych:  - No sedation  - Pain well-controlled: scheduled tylenol, oxycodone/dilaudid prn     #alcohol use  - patient reports drinking 3-4 beers per night, last drink was 1/1/22  - Thiamine/Folate  - Out of window for withdrawal, delirium tremens    CV  #CAD s/p stents  #Carotid stenosis s/p endarterectomy  #PVS s/p multiple LE interventions including b/l femoral arthetectomies  - continue atorv, ASA and holding plavix    #HTN  - Home meds: continue Amlodipine 10, Metoprolol- BID, Losartan 100, Hydralazine 25 TID  - Metoprolol 200 BID since 1/9/22, HTN better controlled     Pulm  #COPD  #Former smoker  - venotlin inhaler  - duonebs prn    #Tracheostomy 1/4  - fresh trach, well healing  - on trach collar 8L / 35%humidified O2, wean as tolerated  - mucomyst for increased secretions    GI  - Nutrition consult  -F: mIVFs off, stopped 250 FW flushes  -E: replete prn  -N: TF 50 ml/hr, goal  - Continue bowel regimen, received suppository 1/9/22 pm     Renal  No active issues  - BUN/Cr 25/0.6  - UOp adequate    Heme/Onc  #Squamous Cell Carcinoma of tongue   #s/p total glossectomy, tracheostomy and free flap reconstruction  - q2h flap checks, arterial doppler without issue    ID  - Finished Unasyn course  - Stable WBC    Endo  - no active issues         PPx:   Feeding: NPO, tube feeds at goal.  Analgesia/Sedation: well controlled: tylenol  Thromboembolic prevention: lovenox  HOB >30: yes  Stress Ulcer ppx: no   Glucose control: Critical care goal 140-180 g/dl, ISS    Lines/Drains/Airway: trach, PIV, RACHEL x 5:  2 right neck, 1 left neck and 2 right leg, PEG      Dispo/Code Status/Palliative:   -- Step down to PCU /  Full Code

## 2022-01-11 NOTE — PLAN OF CARE
"      SICU PLAN OF CARE NOTE    Dx: Squamous cell cancer of tongue    Shift Events: Rapid Response    Goals of Care: wound care, comfort, trach care, flap checks    Neuro: AAO x4, Follows Commands and Moves All Extremities    Vital Signs: /62 (BP Location: Left arm, Patient Position: Lying)   Pulse 83   Temp 98.7 °F (37.1 °C) (Axillary)   Resp 17   Ht 5' 8" (1.727 m)   Wt 66.9 kg (147 lb 7.8 oz)   SpO2 97%   BMI 22.43 kg/m²     Respiratory: trach collar 7L 35%    Diet: Tube Feeds        Urine Output: Voids Spontaneously see I&O    Drains: RACHEL x5       Labs/Accuchecks: Daily Labs    Skin:two new skin tears noted to left arm post fall on floor, added to chart, no further breakdown noted, patient turned Q2H     "

## 2022-01-11 NOTE — PROGRESS NOTES
[facility]  Otorhinolaryngology-Head & Neck Surgery  Progress Note    Subjective:     Post-Op Info:  Procedure(s) (LRB):  GLOSSECTOMY (N/A)  TRACHEOTOMY (N/A)  EGD, WITH PEG TUBE INSERTION (N/A)  CREATION, FREE FLAP (Right)  DISSECTION, NECK (Bilateral)   7 Days Post-Op  Hospital Day: 9     Interval History: Did well since being stepped up to ICU. Yesterday afternoon, patient reports that he did not remember falling yesterday morning. Got some sleep last night.       Medications:  Continuous Infusions:   sodium chloride 0.9% Stopped (01/06/22 0636)     Scheduled Meds:   acetaminophen  650 mg Per G Tube Q6H    acetylcysteine 100 mg/ml (10%)  4 mL Nebulization TID WAKE    amLODIPine  10 mg Per G Tube Daily    aspirin  81 mg Per G Tube Daily    atorvastatin  20 mg Per G Tube Daily    bacitracin zinc   Topical (Top) BID    clopidogreL  75 mg Per G Tube Daily    docusate  100 mg Per G Tube BID    enoxaparin  40 mg Subcutaneous Daily    folic acid  1 mg Per G Tube Daily    hydrALAZINE  25 mg Per G Tube TID    losartan  100 mg Per G Tube Daily    metoprolol tartrate  200 mg Per G Tube BID    mupirocin   Nasal BID    polyethylene glycol  17 g Per G Tube BID    thiamine  100 mg Per G Tube Daily     PRN Meds:albuterol-ipratropium, bisacodyL, hydrALAZINE, ondansetron, oxyCODONE, oxyCODONE, sodium chloride 0.9%     Review of patient's allergies indicates:  No Known Allergies  Objective:     Vital Signs (24h Range):  Temp:  [97 °F (36.1 °C)-98.7 °F (37.1 °C)] 98.7 °F (37.1 °C)  Pulse:  [] 87  Resp:  [13-21] 15  SpO2:  [88 %-100 %] 88 %  BP: ()/(54-75) 106/65       Lines/Drains/Airways     Drain                 Gastrostomy/Enterostomy 01/04/22 Percutaneous endoscopic gastrostomy (PEG) LUQ 7 days         Closed/Suction Drain 01/04/22 1244 Right;Anterior Thigh Bulb 15 Fr. 6 days         Closed/Suction Drain 01/04/22 1246 Right;Anterior Thigh Bulb 15 Fr. 6 days          Closed/Suction Drain 01/04/22 1450 Left Neck Bulb 15 Fr. 6 days         Closed/Suction Drain 01/04/22 1450 Right Neck Bulb 15 Fr. 6 days         Closed/Suction Drain 01/04/22 1450 Right Neck Bulb 15 Fr. 6 days          Airway                 Surgical Airway 01/04/22 1546 Shiley Cuffed 6 days          Peripheral Intravenous Line                 Peripheral IV - Single Lumen 01/08/22 0416 22 G Anterior;Distal;Left Upper Arm 3 days         Peripheral IV - Single Lumen 01/09/22 1630 18 G Anterior;Distal;Left Forearm 1 day                Physical Exam  Awake and alert, NAD   NC/AT, EOMI, clear sclera  Normal external nose   MMM, rosemary-tongue ALTFF in place with good color that approximates the thigh, good tugar and warm, strong arterial doppler signal, and good venous signal from implantable doppler   Neck soft, advancement flaps in place without duskiness, staples intact, JPx2 on right with s/s output, RACHEL x 1 on left with s/s output  Right neck swelling decreased and softer   6-0 cuffed trach sutured in place, cuff down (removed). 6-0 cuffless trach replaced without difficulty and secured with sherry collar. Able to phonate with PMSV.   Normal work of breathing, on trach collar   G tube in place    Significant Labs:  CBC:   Recent Labs   Lab 01/11/22  0238   WBC 7.86   RBC 2.62*   HGB 7.8*   HCT 25.0*      MCV 95   MCH 29.8   MCHC 31.2*     CMP:   Recent Labs   Lab 01/04/22  1636 01/05/22  0311 01/11/22  0238   *   < > 149*   CALCIUM 7.9*   < > 9.3   ALBUMIN 2.7*  --   --    PROT 4.5*  --   --    *   < > 135*   K 4.5   < > 3.9   CO2 18*   < > 27      < > 98   BUN 6*   < > 23   CREATININE 0.6   < > 0.6   ALKPHOS 43*  --   --    ALT 9*  --   --    AST 17  --   --    BILITOT 1.6*  --   --     < > = values in this interval not displayed.       Significant Diagnostics:  I have reviewed and interpreted all pertinent imaging results/findings within the past 24 hours.       Assessment/Plan:     * Squamous  cell cancer of tongue  71M with [id number] SCC of the oral tongue s/p total glossectomy, bilateral neck dissections levels I-IV, tracheostomy, and ALTFF reconstruction with G tube placement by general surgery. Right neck swelling improving.     -Continue q4h flap checks  -restarted Plavix via G tube, continue ASA   -appreciate SLP consult, trach exchanged for cuffless this AM   -pain control (scheduled tylenol, oxycodone PRN)  -trach teaching with respiratory   -strict NPO, TFs at goal  -PT/OT, OOB    Ppx: L40, SCDs, NIKITA hose     Dispo: step down to PCU            Annmarie Oliveira MD  Otorhinolaryngology-Head & Neck Surgery  [facility]

## 2022-01-11 NOTE — ASSESSMENT & PLAN NOTE
71M with [id number] SCC of the oral tongue s/p total glossectomy, bilateral neck dissections levels I-IV, tracheostomy, and ALTFF reconstruction with G tube placement by general surgery. Right neck swelling improving.     -Continue q4h flap checks  -restarted Plavix via G tube, continue ASA   -appreciate SLP consult, trach exchanged for cuffless this AM   -pain control (scheduled tylenol, oxycodone PRN)  -trach teaching with respiratory   -strict NPO, TFs at goal  -PT/OT, OOB    Ppx: L40, SCDs, NIKITA hose     Dispo: step down to PCU

## 2022-01-11 NOTE — PLAN OF CARE
Problem: Physical Therapy Goal  Goal: Physical Therapy Goal  Description: Goals to be met by: 2022    Patient will increase functional independence with mobility by performin. Supine to sit with Supervision   2. Sit to stand transfer with Supervision using RW  3. Gait  x 150 feet with Supervision using Rolling Walker.   4. Ascend/descend 3 stair with right Handrails CGA   5. Stand for 3 minutes with Supervision using RW      Outcome: Ongoing, Progressing     Re-Eval completed. Goals appropriate.

## 2022-01-11 NOTE — PLAN OF CARE
Problem: Occupational Therapy Goal  Goal: Occupational Therapy Goal  Description: Goals to be met by: 7 days 1/18/22     Patient will increase functional independence with ADLs by performing:    Pt to complete standing g/h skills with SBA  Pt to complete UE dressing with DARRYL    Pt to complete LE dressing with MIN A   Pt to complete toileting with SBA  Pt to complete t/f to commode, bed and chair with SBA    Outcome: Ongoing, Progressing

## 2022-01-11 NOTE — PT/OT/SLP RE-EVAL
Physical Therapy Re-Assessment    Patient Name:  Fareed Richard Jr.   MRN:  [id number]  Admit Date: 1/3/2022  Admitting Diagnosis:  Squamous cell cancer of tongue   Length of Stay: 8 days  Recent Surgery: Procedure(s) (LRB):  GLOSSECTOMY (N/A)  TRACHEOTOMY (N/A)  EGD, WITH PEG TUBE INSERTION (N/A)  CREATION, FREE FLAP (Right)  DISSECTION, NECK (Bilateral) 7 Days Post-Op    Please refer to PT initial evaluation for PLOF and social history.  Recommendations:     Discharge Recommendations:  home health PT   Discharge Equipment Recommendations: none   Barriers to discharge: None    Assessment:     Fareed Richard Jr. is a 71 y.o. male admitted with a medical diagnosis of Squamous cell cancer of tongue. Pt t/f to ICU after a fall on the step down unit. Pt not at baseline mobility but did not have a decline in mobility since evaluation. Pt required SBA - CGA for all functional mobility performed . Pt able to ambulate a short distance in the room with a RW. Pt with significantly improved gait stability by using the RW. Recommend HHPT with family support once pt is medically stable for discharge.     Problem List: weakness,impaired endurance,impaired functional mobilty,gait instability,impaired balance,pain,impaired cardiopulmonary response to activity,impaired skin  Rehab Prognosis: Good   Plan:     During this hospitalization, patient to be seen 4 x/week to address the above listed problems via gait training,therapeutic activities,therapeutic exercises,neuromuscular re-education  · Plan of Care Expires:  02/11/22   Plan of Care Reviewed with: patient,spouse    Subjective   Communicated with RN prior to session.  Patient found HOB elevated upon PT entry to room, agreeable to evaluation. Fareed Richard Jr.'s wife present during session.    Chief Complaint: pain   Patient/Family Comments/goals: to get better and return home   Pain/Comfort:  · Pain Rating 1: 8/10  · Location 1:  (L shoulder)  · Pain Addressed 1:  "Reposition,Distraction    Objective:   Patient found with: telemetry,pulse ox (continuous),blood pressure cuff,peripheral IV,PEG Tube,RACHEL drain   General Precautions: Standard, fall   Orthopedic Precautions:N/A   Braces: N/A   Body mass index is 22.43 kg/m².  Oxygen Device: Trach Collar   Vitals: BP (!) 99/57   Pulse 75   Temp 97.2 °F (36.2 °C) (Axillary)   Resp 18   Ht 5' 8" (1.727 m)   Wt 66.9 kg (147 lb 7.8 oz)   SpO2 (!) 88%   BMI 22.43 kg/m²     Exams:  · Cognition:   · Alert and Cooperative  · Ox4  · Command following: Follows multistep  commands  · Fluency: clear/fluent with PMV    · RLE ROM: WFL  · RLE Strength: grossly 4/5  · LLE ROM: WFL  · LLE Strength: grossly 4/5    Outcome Measures:  AM-PAC 6 CLICK MOBILITY  Turning over in bed (including adjusting bedclothes, sheets and blankets)?: 3  Sitting down on and standing up from a chair with arms (e.g., wheelchair, bedside commode, etc.): 3  Moving from lying on back to sitting on the side of the bed?: 3  Moving to and from a bed to a chair (including a wheelchair)?: 3  Need to walk in hospital room?: 3  Climbing 3-5 steps with a railing?: 2  Basic Mobility Total Score: 17     Functional Mobility:  Additional staff present: OT  Bed Mobility:   · Supine to Sit: stand by assistance   · significantly increased time due to weakness and L shoulder pain  · Scooting anteriorly to EOB to have both feet planted on floor: stand by assistance    Sitting Balance at Edge of Bed:   Assistance Level Required: Stand-by Assistance    Transfers:   · Sit <> Stand Transfer: contact guard assistance with RW x 2 trials from EOB       Gait:  · Patient ambulated: 10ft + 6ft (seated rest break between trials)   · Patient required: contact guard  · Patient used:  Rolling Walker  · Gait Pattern observed: reciprocal gait  · Gait Deviation(s): decreased step length, flexed posture and decreased tammi  · Impairments due to: pain, decreased strength and decreased " endurance  · Comments:   · Verbal cuing for RW management, pacing, and upright posture       Therapeutic Activities, Exercises, Education:   Educated pt on PT role/POC  Educated pt on importance of OOB activity and daily ambulation   Educated pt on importance of performing exercises daily  Pt verbalized understanding     T/f to chair to increase tolerance to OOB activity and to create optimal positioning for lung expansion       Patient left up in chair with all lines intact, call button in reach, RN notified and wife present..    GOALS:   Multidisciplinary Problems     Physical Therapy Goals        Problem: Physical Therapy Goal    Goal Priority Disciplines Outcome Goal Variances Interventions   Physical Therapy Goal     PT, PT/OT Ongoing, Progressing     Description: Goals to be met by: 2022    Patient will increase functional independence with mobility by performin. Supine to sit with Supervision   2. Sit to stand transfer with Supervision using RW  3. Gait  x 150 feet with Supervision using Rolling Walker.   4. Ascend/descend 3 stair with right Handrails CGA   5. Stand for 3 minutes with Supervision using RW                       Time Tracking:     PT Received On: 22  PT Start Time: 1025     PT Stop Time: 1053  PT Total Time (min): 28 min     Billable Minutes: Re-eval 10 and Gait Training 10

## 2022-01-11 NOTE — PLAN OF CARE
"      SICU PLAN OF CARE NOTE    Dx: Squamous cell cancer of tongue    Shift Events: NAEON. Dr. Retana notified of patient's status overnight. MD aware of morning labs. Plan of care reviewed with patient, patient awaiting stepdown bed.     Goals of Care: MAPs > 65; Q2H flap checks    Neuro: AAO x4, Follows Commands, and Moves All Extremities    Vital Signs: /67 (BP Location: Left arm, Patient Position: Lying)   Pulse 82   Temp 98.7 °F (37.1 °C) (Axillary)   Resp 17   Ht 5' 8" (1.727 m)   Wt 66.9 kg (147 lb 7.8 oz)   SpO2 (!) 94%   BMI 22.43 kg/m²     Respiratory: Trach collar 7L and 35% FiO2    Diet: NPO and Tube Feeds TF at goal @ 50mL/hr    Gtts: N/A    Urine Output: Voids Spontaneously 555 cc/shift    Drains: See output flowsheets for RACHEL output.     Flap Checks Q2H flaps. Flap remains warm, soft, with strong pulses. No issues overnight.     Labs/Accuchecks: Daily labs trended. Discussed AM labs with Dr. Retana.     Skin: No new skin breakdown noted. Turned Q2H as tolerated with pillows. Skin tears cleaned with normal saline and foams placed.       "

## 2022-01-11 NOTE — PLAN OF CARE
Problem: SLP Goal  Goal: SLP Goal  Description: Speech Language Pathology Goals  Goals expected to be met by 1/12    1. Pt will tolerate PMSV for 5 minutes with >92% spO2 to increase phonation, respiration and swallowing aspects  2. Pt/family will don/doff PMSV x1 during session with min cues  3. Pt will vocalize with PMSV in place to increase verbal expression.         Outcome: Ongoing, Progressing   Continue POC at this time, MBSS scheduled for tomorrow.   1/11/2022

## 2022-01-11 NOTE — PROGRESS NOTES
[facility]  Critical Care - Surgery  Progress Note    Patient Name: Fareed Richard Jr.  MRN: [id number]  Admission Date: 1/3/2022  Hospital Length of Stay: 8 days  Code Status: Full Code  Attending Provider: Naeem Smalls MD  Primary Care Provider: Kodi Tubbs MD   Principal Problem: Squamous cell cancer of tongue    Subjective:     Hospital/ICU Course:  Fareed Richard Jr. is a 71 y.o. M with hx of squamous cell carcinoma, HTN, CAD s/p stents, carotid stenosis s/p carotid endarterectomy in 2018, PAD s/p b/l femoral atherectomies and other LE interventions s/p total glossectomy, tracheostomy and free flap reconstruction with ENT on 1/4.  The patient has been doing well since surgery, requiring limited pain control.  He was stepped down to the floor but stepped back after a fall out of bed.       Interval History/Significant Events: The patient was stepped back up to the SICU after sustaining a fall out of bed while he was trying to grab for his suction catheter.  He did not hit his head or have LOC.  He sustained a laceration to his R forearm and L hand, which were cleaned and bandaged.  He has not injury to his flap.  He received mucomist for his increased secretions which he reports is better today.  Otherwise, the patient is doing well and reports good pain control.    Follow-up For: Procedure(s) (LRB):  GLOSSECTOMY (N/A)  TRACHEOTOMY (N/A)  EGD, WITH PEG TUBE INSERTION (N/A)  CREATION, FREE FLAP (Right)  DISSECTION, NECK (Bilateral)    Post-Operative Day: 7 Days Post-Op    Objective:     Vital Signs (Most Recent):  Temp: 98.7 °F (37.1 °C) (01/11/22 0300)  Pulse: 86 (01/11/22 0545)  Resp: 17 (01/11/22 0545)  BP: 117/65 (01/11/22 0530)  SpO2: (!) 90 % (01/11/22 0545) Vital Signs (24h Range):  Temp:  [96.8 °F (36 °C)-98.7 °F (37.1 °C)] 98.7 °F (37.1 °C)  Pulse:  [] 86  Resp:  [14-21] 17  SpO2:  [88 %-100 %] 90 %  BP: ()/(54-75) 117/65     Weight: 66.9 kg (147 lb 7.8  oz)  Body mass index is 22.43 kg/m².      Intake/Output Summary (Last 24 hours) at 1/11/2022 0617  Last data filed at 1/11/2022 0500  Gross per 24 hour   Intake 1440 ml   Output 1201 ml   Net 239 ml       Physical Exam  Constitutional:       General: He is not in acute distress.     Appearance: He is not ill-appearing, toxic-appearing or diaphoretic.   HENT:      Head:      Comments: Cervical facial flap soft, appropriate coloring, + triphasic artery  Pulses, Fresh trach CDI     Right Ear: External ear normal.      Left Ear: External ear normal.      Nose: No congestion.      Mouth/Throat:      Mouth: Mucous membranes are moist.   Eyes:      Extraocular Movements: Extraocular movements intact.      Pupils: Pupils are equal, round, and reactive to light.   Cardiovascular:      Rate and Rhythm: Normal rate and regular rhythm.      Pulses: Normal pulses.      Heart sounds: Normal heart sounds. No murmur heard.      Pulmonary:      Effort: Pulmonary effort is normal. No respiratory distress.      Breath sounds: Normal breath sounds. No wheezing.      Comments: Trach collar  Abdominal:      General: Abdomen is flat. There is no distension.      Palpations: Abdomen is soft.      Tenderness: There is no abdominal tenderness. There is no guarding.      Comments: G tube in place   Musculoskeletal:         General: No swelling.      Cervical back: Normal range of motion and neck supple. Tenderness present. No rigidity.      Comments: Donor Site: R thigh incision CDI, no signs of hematoma. R DP pulses intact. Drains with minimal SS output   Skin:     General: Skin is warm and dry.      Comments: Abrasion left hand, left forearm, left arm. Well bandaged.   Neurological:      General: No focal deficit present.      Mental Status: He is alert and oriented to person, place, and time.   Psychiatric:         Judgment: Judgment normal.         Trach Collar:  Oxygen Concentration (%): 35 (01/11/22 0500)    Lines/Drains/Airways      Drain                 Gastrostomy/Enterostomy 01/04/22 Percutaneous endoscopic gastrostomy (PEG) LUQ 7 days         Closed/Suction Drain 01/04/22 1244 Right;Anterior Thigh Bulb 15 Fr. 6 days         Closed/Suction Drain 01/04/22 1246 Right;Anterior Thigh Bulb 15 Fr. 6 days         Closed/Suction Drain 01/04/22 1450 Left Neck Bulb 15 Fr. 6 days         Closed/Suction Drain 01/04/22 1450 Right Neck Bulb 15 Fr. 6 days         Closed/Suction Drain 01/04/22 1450 Right Neck Bulb 15 Fr. 6 days          Airway                 Surgical Airway 01/04/22 1546 Shiley Cuffed 6 days          Peripheral Intravenous Line                 Peripheral IV - Single Lumen 01/08/22 0416 22 G Anterior;Distal;Left Upper Arm 3 days         Peripheral IV - Single Lumen 01/09/22 1630 18 G Anterior;Distal;Left Forearm 1 day                Significant Labs:    CBC/Anemia Profile:  Recent Labs   Lab 01/10/22  0438 01/10/22  0629 01/11/22  0238   WBC 9.65  --  7.86   HGB 8.4*  --  7.8*   HCT 27.1* 26* 25.0*     --  298   MCV 95  --  95   RDW 14.6*  --  14.6*        Chemistries:  Recent Labs   Lab 01/10/22  0438 01/11/22  0238   * 135*   K 4.2 3.9   CL 97 98   CO2 30* 27   BUN 25* 23   CREATININE 0.6 0.6   CALCIUM 9.6 9.3   MG 2.1 2.1   PHOS 4.8* 4.9*       Significant Imaging:  I have reviewed all pertinent imaging results/findings within the past 24 hours.    Assessment/Plan:     * Squamous cell cancer of tongue  Fareed Richard Jr. is a 71 y.o. M with hx of squamous cell carcinoma, HTN, CAD s/p stents, carotid stenosis s/p carotid endarterectomy, PAD s/p b/l femoral atherectomies and other LE interventions who was admitted to SICU and will undergo glossectomy and face/neck reconstruction with ENT on 1/4/21.    Neuro/Psych:  - No sedation  - Pain well-controlled: scheduled tylenol, oxycodone/dilaudid prn     #alcohol use  - patient reports drinking 3-4 beers per night, last drink was 1/1/22  - Thiamine/Folate  - Out of window for  withdrawal, delirium tremens    CV  #CAD s/p stents  #Carotid stenosis s/p endarterectomy  #PVS s/p multiple LE interventions including b/l femoral arthetectomies  - continue atorv, ASA and plavix    #HTN  - Home meds: continue Amlodipine 10, Metoprolol- BID, Losartan 100, Hydralazine 25 TID  - Metoprolol 200 BID since 1/9/22, HTN better controlled  - discontinue hydralazine     Pulm  #COPD  #Former smoker  - venotlin inhaler  - duonebs prn    #Tracheostomy 1/4  - fresh trach, well healing  - on trach collar 8L / 35%humidified O2, wean as tolerated  - mucomyst for increased secretions    GI  - Nutrition consult  -F: mIVFs off, stopped 250 FW flushes  -E: replete prn  -N: TF 50 ml/hr, goal  - Continue bowel regimen, received suppository 1/9/22 pm     Renal  No active issues  - BUN/Cr 25/0.6  - UOp adequate    Heme/Onc  #Squamous Cell Carcinoma of tongue   #s/p total glossectomy, tracheostomy and free flap reconstruction  - q2h flap checks, arterial doppler without issue    ID  - Finished Unasyn course  - Stable WBC    Endo  - no active issues         PPx:   Feeding: NPO, tube feeds at goal.  Analgesia/Sedation: well controlled: tylenol  Thromboembolic prevention: lovenox  HOB >30: yes  Stress Ulcer ppx: no   Glucose control: Critical care goal 140-180 g/dl, ISS    Lines/Drains/Airway: trach, PIV, RACHEL x 5:  2 right neck, 1 left neck and 2 right leg, PEG      Dispo/Code Status/Palliative:   -- Step down to PCU / Full Code         Critical care was time spent personally by me on the following activities: development of treatment plan with patient or surrogate and bedside caregivers, discussions with consultants, evaluation of patient's response to treatment, examination of patient, ordering and performing treatments and interventions, ordering and review of laboratory studies, ordering and review of radiographic studies, pulse oximetry, re-evaluation of patient's condition.  This critical care time did not  overlap with that of any other provider or involve time for any procedures.     Brenden Dalton, DO  Critical Care - Surgery  [facility]

## 2022-01-11 NOTE — SUBJECTIVE & OBJECTIVE
Interval History/Significant Events: The patient was stepped back up to the SICU after sustaining a fall out of bed while he was trying to grab for his suction catheter.  He did not hit his head or have LOC.  He sustained a laceration to his R forearm and L hand, which were cleaned and bandaged.  He has not injury to his flap.  He received mucomist for his increased secretions which he reports is better today.  Otherwise, the patient is doing well and reports good pain control.    Follow-up For: Procedure(s) (LRB):  GLOSSECTOMY (N/A)  TRACHEOTOMY (N/A)  EGD, WITH PEG TUBE INSERTION (N/A)  CREATION, FREE FLAP (Right)  DISSECTION, NECK (Bilateral)    Post-Operative Day: 7 Days Post-Op    Objective:     Vital Signs (Most Recent):  Temp: 98.7 °F (37.1 °C) (01/11/22 0300)  Pulse: 86 (01/11/22 0545)  Resp: 17 (01/11/22 0545)  BP: 117/65 (01/11/22 0530)  SpO2: (!) 90 % (01/11/22 0545) Vital Signs (24h Range):  Temp:  [96.8 °F (36 °C)-98.7 °F (37.1 °C)] 98.7 °F (37.1 °C)  Pulse:  [] 86  Resp:  [14-21] 17  SpO2:  [88 %-100 %] 90 %  BP: ()/(54-75) 117/65     Weight: 66.9 kg (147 lb 7.8 oz)  Body mass index is 22.43 kg/m².      Intake/Output Summary (Last 24 hours) at 1/11/2022 0617  Last data filed at 1/11/2022 0500  Gross per 24 hour   Intake 1440 ml   Output 1201 ml   Net 239 ml       Physical Exam  Constitutional:       General: He is not in acute distress.     Appearance: He is not ill-appearing, toxic-appearing or diaphoretic.   HENT:      Head:      Comments: Cervical facial flap soft, appropriate coloring, + triphasic artery  Pulses, Fresh trach CDI     Right Ear: External ear normal.      Left Ear: External ear normal.      Nose: No congestion.      Mouth/Throat:      Mouth: Mucous membranes are moist.   Eyes:      Extraocular Movements: Extraocular movements intact.      Pupils: Pupils are equal, round, and reactive to light.   Cardiovascular:      Rate and Rhythm: Normal rate and regular rhythm.       Pulses: Normal pulses.      Heart sounds: Normal heart sounds. No murmur heard.      Pulmonary:      Effort: Pulmonary effort is normal. No respiratory distress.      Breath sounds: Normal breath sounds. No wheezing.      Comments: Trach collar  Abdominal:      General: Abdomen is flat. There is no distension.      Palpations: Abdomen is soft.      Tenderness: There is no abdominal tenderness. There is no guarding.      Comments: G tube in place   Musculoskeletal:         General: No swelling.      Cervical back: Normal range of motion and neck supple. Tenderness present. No rigidity.      Comments: Donor Site: R thigh incision CDI, no signs of hematoma. R DP pulses intact. Drains with minimal SS output   Skin:     General: Skin is warm and dry.      Comments: Abrasion left hand, left forearm, left arm. Well bandaged.   Neurological:      General: No focal deficit present.      Mental Status: He is alert and oriented to person, place, and time.   Psychiatric:         Judgment: Judgment normal.         Trach Collar:  Oxygen Concentration (%): 35 (01/11/22 0500)    Lines/Drains/Airways     Drain                 Gastrostomy/Enterostomy 01/04/22 Percutaneous endoscopic gastrostomy (PEG) LUQ 7 days         Closed/Suction Drain 01/04/22 1244 Right;Anterior Thigh Bulb 15 Fr. 6 days         Closed/Suction Drain 01/04/22 1246 Right;Anterior Thigh Bulb 15 Fr. 6 days         Closed/Suction Drain 01/04/22 1450 Left Neck Bulb 15 Fr. 6 days         Closed/Suction Drain 01/04/22 1450 Right Neck Bulb 15 Fr. 6 days         Closed/Suction Drain 01/04/22 1450 Right Neck Bulb 15 Fr. 6 days          Airway                 Surgical Airway 01/04/22 1546 Shiley Cuffed 6 days          Peripheral Intravenous Line                 Peripheral IV - Single Lumen 01/08/22 0416 22 G Anterior;Distal;Left Upper Arm 3 days         Peripheral IV - Single Lumen 01/09/22 1630 18 G Anterior;Distal;Left Forearm 1 day                Significant  Labs:    CBC/Anemia Profile:  Recent Labs   Lab 01/10/22  0438 01/10/22  0629 01/11/22  0238   WBC 9.65  --  7.86   HGB 8.4*  --  7.8*   HCT 27.1* 26* 25.0*     --  298   MCV 95  --  95   RDW 14.6*  --  14.6*        Chemistries:  Recent Labs   Lab 01/10/22  0438 01/11/22  0238   * 135*   K 4.2 3.9   CL 97 98   CO2 30* 27   BUN 25* 23   CREATININE 0.6 0.6   CALCIUM 9.6 9.3   MG 2.1 2.1   PHOS 4.8* 4.9*       Significant Imaging:  I have reviewed all pertinent imaging results/findings within the past 24 hours.

## 2022-01-11 NOTE — PT/OT/SLP PROGRESS
Speech Language Pathology Treatment    Patient Name:  Fareed Richard Jr.   MRN:  [id number]  Admitting Diagnosis: Squamous cell cancer of tongue    Recommendations:                 General Recommendations:  One Way Speaking Valve  Diet recommendations:  NPO, Liquid Diet Level: NPO   Aspiration Precautions: Strict aspiration precautions   General Precautions: Standard, fall  Communication strategies:  One way speaking valve    Subjective     Awake/alert  Spouse at bedside     Pain/Comfort:  · Pain Rating 1: 0/10  · Pain Rating Post-Intervention 1: 0/10    Respiratory Status:trach collar    Objective:     Has the patient been evaluated by SLP for swallowing?   Yes  Keep patient NPO? Yes     Passy [place] Speaking Valve  Trach size/type:Shiley 6 cuffless   Able to phonate around trach:yes  O2 sats at rest:97%  O2 sats with PMSV in place: 95%  Vocal quality with valve in place:wet, adequate intensity. Pt unable to initiate swallow pt had to clear throat and clear secretions orally with suction  Back pressure upon removal: mild  Minutes PMSV worn: Valve in place upon entry and wore valve throughout session.   Education topics addressed: cleaning valve, one-way technology, anatomy of trach, precautions with cuffed trach, removal of valve prior to sleeping      Assessment:     Fareed Richard Jr. is a 71 y.o. male with an SLP diagnosis of Dysphagia and One Way Speaking Valve Training.     Goals:   Multidisciplinary Problems     SLP Goals        Problem: SLP Goal    Goal Priority Disciplines Outcome   SLP Goal     SLP Ongoing, Progressing   Description: Speech Language Pathology Goals  Goals expected to be met by 1/12    1. Pt will tolerate PMSV for 5 minutes with >92% spO2 to increase phonation, respiration and swallowing aspects  2. Pt/family will don/doff PMSV x1 during session with min cues  3. Pt will vocalize with PMSV in place to increase verbal expression.                          Plan:     · Patient to be seen:  4 x/week    · Plan of Care expires:     · Plan of Care reviewed with:  patient,spouse   · SLP Follow-Up:  Yes       Discharge recommendations:  home with home health,outpatient speech therapy       Time Tracking:     SLP Treatment Date:   01/11/22  Speech Start Time:  0820  Speech Stop Time:  0834     Speech Total Time (min):  14 min    Billable Minutes: Speech Therapy Individual 6 and Self Care/Home Management Training 8    01/11/2022

## 2022-01-11 NOTE — PLAN OF CARE
"SICU PLAN OF CARE NOTE    Dx: Squamous cell cancer of tongue    Goals of Care:  MAP >65, pain control and comfort    Vital Signs:  BP (!) 100/58 (BP Location: Left arm, Patient Position: Lying)   Pulse 80   Temp 97.2 °F (36.2 °C) (Axillary)   Resp (!) 22   Ht 5' 8" (1.727 m)   Wt 66.9 kg (147 lb 7.8 oz)   SpO2 (!) 92%   BMI 22.43 kg/m²     Cardiac:  NSR    Resp:  SpO2 >90% on trach collar 7L and 35% FiO2    Neuro:  AAO x4, Follows Commands, and Moves All Extremities, pt wearing speaking valve during shift and tolerating well.     Urine Output:  Voids Spontaneously 675 cc/shift    Drains:    All drains output monitored for bleeding, see flow sheet for more details    Flap Checks Q4h per MD orders, see flowhseets for assessment details    Diet:  Tube Feeds @ 50 cc/hr     Labs/Accuchecks:  All labs trended, reviewed and replaced accordingly.     Skin:  All skin monitored for redness and breakdown during shift. Multiple areas of altered skin integrity to RUE and LUE, all wound care orders performed. Incisions and drain sites monitored during shift, see flowsheets for details. All pressure points protected, pt moes extremities, OOBTC.     Shift Events: See flowsheet for further assessment/details.  Patient wife at bedside and updated on current condition/plan of care, questions answered, and emotional support provided.   MD updated on current condition, vitals, labs, and gtts.    "

## 2022-01-11 NOTE — SUBJECTIVE & OBJECTIVE
Interval History: Did well since being stepped up to ICU. Yesterday afternoon, patient reports that he did not remember falling yesterday morning. Got some sleep last night.       Medications:  Continuous Infusions:   sodium chloride 0.9% Stopped (01/06/22 0636)     Scheduled Meds:   acetaminophen  650 mg Per G Tube Q6H    acetylcysteine 100 mg/ml (10%)  4 mL Nebulization TID WAKE    amLODIPine  10 mg Per G Tube Daily    aspirin  81 mg Per G Tube Daily    atorvastatin  20 mg Per G Tube Daily    bacitracin zinc   Topical (Top) BID    clopidogreL  75 mg Per G Tube Daily    docusate  100 mg Per G Tube BID    enoxaparin  40 mg Subcutaneous Daily    folic acid  1 mg Per G Tube Daily    hydrALAZINE  25 mg Per G Tube TID    losartan  100 mg Per G Tube Daily    metoprolol tartrate  200 mg Per G Tube BID    mupirocin   Nasal BID    polyethylene glycol  17 g Per G Tube BID    thiamine  100 mg Per G Tube Daily     PRN Meds:albuterol-ipratropium, bisacodyL, hydrALAZINE, ondansetron, oxyCODONE, oxyCODONE, sodium chloride 0.9%     Review of patient's allergies indicates:  No Known Allergies  Objective:     Vital Signs (24h Range):  Temp:  [97 °F (36.1 °C)-98.7 °F (37.1 °C)] 98.7 °F (37.1 °C)  Pulse:  [] 87  Resp:  [13-21] 15  SpO2:  [88 %-100 %] 88 %  BP: ()/(54-75) 106/65       Lines/Drains/Airways     Drain                 Gastrostomy/Enterostomy 01/04/22 Percutaneous endoscopic gastrostomy (PEG) LUQ 7 days         Closed/Suction Drain 01/04/22 1244 Right;Anterior Thigh Bulb 15 Fr. 6 days         Closed/Suction Drain 01/04/22 1246 Right;Anterior Thigh Bulb 15 Fr. 6 days         Closed/Suction Drain 01/04/22 1450 Left Neck Bulb 15 Fr. 6 days         Closed/Suction Drain 01/04/22 1450 Right Neck Bulb 15 Fr. 6 days         Closed/Suction Drain 01/04/22 1450 Right Neck Bulb 15 Fr. 6 days          Airway                 Surgical Airway 01/04/22 1546 Shiley Cuffed 6 days          Peripheral Intravenous  Line                 Peripheral IV - Single Lumen 01/08/22 0416 22 G Anterior;Distal;Left Upper Arm 3 days         Peripheral IV - Single Lumen 01/09/22 1630 18 G Anterior;Distal;Left Forearm 1 day                Physical Exam  Awake and alert, NAD   NC/AT, EOMI, clear sclera  Normal external nose   MMM, rosemary-tongue ALTFF in place with good color that approximates the thigh, good tugar and warm, strong arterial doppler signal, and good venous signal from implantable doppler   Neck soft, advancement flaps in place without duskiness, staples intact, JPx2 on right with s/s output, RACHEL x 1 on left with s/s output  Right neck swelling decreased and softer   6-0 cuffed trach sutured in place, cuff down (removed). 6-0 cuffless trach replaced without difficulty and secured with sherry collar. Able to phonate with PMSV.   Normal work of breathing, on trach collar   G tube in place    Significant Labs:  CBC:   Recent Labs   Lab 01/11/22  0238   WBC 7.86   RBC 2.62*   HGB 7.8*   HCT 25.0*      MCV 95   MCH 29.8   MCHC 31.2*     CMP:   Recent Labs   Lab 01/04/22  1636 01/05/22  0311 01/11/22  0238   *   < > 149*   CALCIUM 7.9*   < > 9.3   ALBUMIN 2.7*  --   --    PROT 4.5*  --   --    *   < > 135*   K 4.5   < > 3.9   CO2 18*   < > 27      < > 98   BUN 6*   < > 23   CREATININE 0.6   < > 0.6   ALKPHOS 43*  --   --    ALT 9*  --   --    AST 17  --   --    BILITOT 1.6*  --   --     < > = values in this interval not displayed.       Significant Diagnostics:  I have reviewed and interpreted all pertinent imaging results/findings within the past 24 hours.

## 2022-01-12 LAB
ANION GAP SERPL CALC-SCNC: 7 MMOL/L (ref 8–16)
APTT BLDCRRT: 29.1 SEC (ref 21–32)
BASOPHILS # BLD AUTO: 0.05 K/UL (ref 0–0.2)
BASOPHILS NFR BLD: 0.5 % (ref 0–1.9)
BUN SERPL-MCNC: 21 MG/DL (ref 8–23)
CALCIUM SERPL-MCNC: 9.2 MG/DL (ref 8.7–10.5)
CHLORIDE SERPL-SCNC: 99 MMOL/L (ref 95–110)
CO2 SERPL-SCNC: 29 MMOL/L (ref 23–29)
CREAT SERPL-MCNC: 0.6 MG/DL (ref 0.5–1.4)
DIFFERENTIAL METHOD: ABNORMAL
EOSINOPHIL # BLD AUTO: 0.6 K/UL (ref 0–0.5)
EOSINOPHIL NFR BLD: 5.7 % (ref 0–8)
ERYTHROCYTE [DISTWIDTH] IN BLOOD BY AUTOMATED COUNT: 14.7 % (ref 11.5–14.5)
EST. GFR  (AFRICAN AMERICAN): >60 ML/MIN/1.73 M^2
EST. GFR  (NON AFRICAN AMERICAN): >60 ML/MIN/1.73 M^2
GLUCOSE SERPL-MCNC: 140 MG/DL (ref 70–110)
HCT VFR BLD AUTO: 26 % (ref 40–54)
HGB BLD-MCNC: 8.2 G/DL (ref 14–18)
IMM GRANULOCYTES # BLD AUTO: 0.1 K/UL (ref 0–0.04)
IMM GRANULOCYTES NFR BLD AUTO: 1 % (ref 0–0.5)
INR PPP: 0.9 (ref 0.8–1.2)
LYMPHOCYTES # BLD AUTO: 0.7 K/UL (ref 1–4.8)
LYMPHOCYTES NFR BLD: 7.4 % (ref 18–48)
MAGNESIUM SERPL-MCNC: 2 MG/DL (ref 1.6–2.6)
MCH RBC QN AUTO: 30.1 PG (ref 27–31)
MCHC RBC AUTO-ENTMCNC: 31.5 G/DL (ref 32–36)
MCV RBC AUTO: 96 FL (ref 82–98)
MONOCYTES # BLD AUTO: 1.5 K/UL (ref 0.3–1)
MONOCYTES NFR BLD: 14.8 % (ref 4–15)
NEUTROPHILS # BLD AUTO: 7 K/UL (ref 1.8–7.7)
NEUTROPHILS NFR BLD: 70.6 % (ref 38–73)
NRBC BLD-RTO: 0 /100 WBC
PHOSPHATE SERPL-MCNC: 4.5 MG/DL (ref 2.7–4.5)
PLATELET # BLD AUTO: 370 K/UL (ref 150–450)
PMV BLD AUTO: 8.9 FL (ref 9.2–12.9)
POTASSIUM SERPL-SCNC: 3.8 MMOL/L (ref 3.5–5.1)
PROTHROMBIN TIME: 10.2 SEC (ref 9–12.5)
RBC # BLD AUTO: 2.72 M/UL (ref 4.6–6.2)
SODIUM SERPL-SCNC: 135 MMOL/L (ref 136–145)
WBC # BLD AUTO: 9.87 K/UL (ref 3.9–12.7)

## 2022-01-12 PROCEDURE — 85025 COMPLETE CBC W/AUTO DIFF WBC: CPT | Performed by: STUDENT IN AN ORGANIZED HEALTH CARE EDUCATION/TRAINING PROGRAM

## 2022-01-12 PROCEDURE — 85610 PROTHROMBIN TIME: CPT | Performed by: STUDENT IN AN ORGANIZED HEALTH CARE EDUCATION/TRAINING PROGRAM

## 2022-01-12 PROCEDURE — 25000003 PHARM REV CODE 250: Performed by: STUDENT IN AN ORGANIZED HEALTH CARE EDUCATION/TRAINING PROGRAM

## 2022-01-12 PROCEDURE — 94640 AIRWAY INHALATION TREATMENT: CPT

## 2022-01-12 PROCEDURE — A9698 NON-RAD CONTRAST MATERIALNOC: HCPCS | Performed by: OTOLARYNGOLOGY

## 2022-01-12 PROCEDURE — 25000242 PHARM REV CODE 250 ALT 637 W/ HCPCS

## 2022-01-12 PROCEDURE — 99900026 HC AIRWAY MAINTENANCE (STAT)

## 2022-01-12 PROCEDURE — 25000003 PHARM REV CODE 250: Performed by: OTOLARYNGOLOGY

## 2022-01-12 PROCEDURE — 63600175 PHARM REV CODE 636 W HCPCS: Performed by: STUDENT IN AN ORGANIZED HEALTH CARE EDUCATION/TRAINING PROGRAM

## 2022-01-12 PROCEDURE — 97530 THERAPEUTIC ACTIVITIES: CPT

## 2022-01-12 PROCEDURE — 27000221 HC OXYGEN, UP TO 24 HOURS

## 2022-01-12 PROCEDURE — 99233 SBSQ HOSP IP/OBS HIGH 50: CPT | Mod: ,,, | Performed by: STUDENT IN AN ORGANIZED HEALTH CARE EDUCATION/TRAINING PROGRAM

## 2022-01-12 PROCEDURE — 94761 N-INVAS EAR/PLS OXIMETRY MLT: CPT

## 2022-01-12 PROCEDURE — 20000000 HC ICU ROOM

## 2022-01-12 PROCEDURE — 97535 SELF CARE MNGMENT TRAINING: CPT

## 2022-01-12 PROCEDURE — 99900035 HC TECH TIME PER 15 MIN (STAT)

## 2022-01-12 PROCEDURE — 85730 THROMBOPLASTIN TIME PARTIAL: CPT | Performed by: STUDENT IN AN ORGANIZED HEALTH CARE EDUCATION/TRAINING PROGRAM

## 2022-01-12 PROCEDURE — 80048 BASIC METABOLIC PNL TOTAL CA: CPT | Performed by: STUDENT IN AN ORGANIZED HEALTH CARE EDUCATION/TRAINING PROGRAM

## 2022-01-12 PROCEDURE — 83735 ASSAY OF MAGNESIUM: CPT | Performed by: STUDENT IN AN ORGANIZED HEALTH CARE EDUCATION/TRAINING PROGRAM

## 2022-01-12 PROCEDURE — 84100 ASSAY OF PHOSPHORUS: CPT | Performed by: STUDENT IN AN ORGANIZED HEALTH CARE EDUCATION/TRAINING PROGRAM

## 2022-01-12 PROCEDURE — 97116 GAIT TRAINING THERAPY: CPT

## 2022-01-12 PROCEDURE — 25500020 PHARM REV CODE 255: Performed by: OTOLARYNGOLOGY

## 2022-01-12 PROCEDURE — 25000003 PHARM REV CODE 250

## 2022-01-12 PROCEDURE — 25000242 PHARM REV CODE 250 ALT 637 W/ HCPCS: Performed by: STUDENT IN AN ORGANIZED HEALTH CARE EDUCATION/TRAINING PROGRAM

## 2022-01-12 PROCEDURE — 99233 PR SUBSEQUENT HOSPITAL CARE,LEVL III: ICD-10-PCS | Mod: ,,, | Performed by: STUDENT IN AN ORGANIZED HEALTH CARE EDUCATION/TRAINING PROGRAM

## 2022-01-12 PROCEDURE — 92611 MOTION FLUOROSCOPY/SWALLOW: CPT

## 2022-01-12 RX ADMIN — DOCUSATE SODIUM 100 MG: 50 LIQUID ORAL at 09:01

## 2022-01-12 RX ADMIN — IPRATROPIUM BROMIDE AND ALBUTEROL SULFATE 3 ML: 2.5; .5 SOLUTION RESPIRATORY (INHALATION) at 07:01

## 2022-01-12 RX ADMIN — OXYCODONE HYDROCHLORIDE 10 MG: 5 SOLUTION ORAL at 09:01

## 2022-01-12 RX ADMIN — ACETYLCYSTEINE 4 ML: 100 INHALANT RESPIRATORY (INHALATION) at 08:01

## 2022-01-12 RX ADMIN — POLYETHYLENE GLYCOL 3350 17 G: 17 POWDER, FOR SOLUTION ORAL at 09:01

## 2022-01-12 RX ADMIN — OXYCODONE HYDROCHLORIDE 10 MG: 5 SOLUTION ORAL at 01:01

## 2022-01-12 RX ADMIN — SODIUM CHLORIDE, SODIUM LACTATE, POTASSIUM CHLORIDE, AND CALCIUM CHLORIDE 500 ML: .6; .31; .03; .02 INJECTION, SOLUTION INTRAVENOUS at 08:01

## 2022-01-12 RX ADMIN — OXYCODONE HYDROCHLORIDE 5 MG: 5 SOLUTION ORAL at 10:01

## 2022-01-12 RX ADMIN — ACETAMINOPHEN 650 MG: 325 TABLET ORAL at 06:01

## 2022-01-12 RX ADMIN — LOSARTAN POTASSIUM 50 MG: 50 TABLET, FILM COATED ORAL at 09:01

## 2022-01-12 RX ADMIN — ACETAMINOPHEN 650 MG: 325 TABLET ORAL at 05:01

## 2022-01-12 RX ADMIN — ASPIRIN 81 MG CHEWABLE TABLET 81 MG: 81 TABLET CHEWABLE at 09:01

## 2022-01-12 RX ADMIN — MUPIROCIN: 20 OINTMENT TOPICAL at 09:01

## 2022-01-12 RX ADMIN — ENOXAPARIN SODIUM 40 MG: 40 INJECTION SUBCUTANEOUS at 05:01

## 2022-01-12 RX ADMIN — THIAMINE HCL TAB 100 MG 100 MG: 100 TAB at 09:01

## 2022-01-12 RX ADMIN — ACETYLCYSTEINE 4 ML: 100 INHALANT RESPIRATORY (INHALATION) at 07:01

## 2022-01-12 RX ADMIN — SODIUM CHLORIDE, SODIUM LACTATE, POTASSIUM CHLORIDE, AND CALCIUM CHLORIDE 500 ML: .6; .31; .03; .02 INJECTION, SOLUTION INTRAVENOUS at 11:01

## 2022-01-12 RX ADMIN — AMLODIPINE BESYLATE 10 MG: 10 TABLET ORAL at 09:01

## 2022-01-12 RX ADMIN — OXYCODONE HYDROCHLORIDE 10 MG: 5 SOLUTION ORAL at 04:01

## 2022-01-12 RX ADMIN — FOLIC ACID 1 MG: 1 TABLET ORAL at 09:01

## 2022-01-12 RX ADMIN — BACITRACIN 1 EACH: 500 OINTMENT TOPICAL at 09:01

## 2022-01-12 RX ADMIN — ATORVASTATIN CALCIUM 20 MG: 20 TABLET, FILM COATED ORAL at 09:01

## 2022-01-12 RX ADMIN — BARIUM SULFATE 5 ML: 0.81 POWDER, FOR SUSPENSION ORAL at 02:01

## 2022-01-12 RX ADMIN — IPRATROPIUM BROMIDE AND ALBUTEROL SULFATE 3 ML: 2.5; .5 SOLUTION RESPIRATORY (INHALATION) at 08:01

## 2022-01-12 RX ADMIN — METOPROLOL TARTRATE 200 MG: 50 TABLET, FILM COATED ORAL at 09:01

## 2022-01-12 RX ADMIN — CLOPIDOGREL 75 MG: 75 TABLET, FILM COATED ORAL at 09:01

## 2022-01-12 NOTE — PROGRESS NOTES
Nurse called for RT to bedside due to complications with suctioning patient via trach. Upon entering room, trach is noticeably misplaced and seems out of position, patient sats 84%. Attempted to advance suction catheter to suction patient, catheter wouldn't advance without meeting resistance. Attempted to reposition trach back in place without success. RT [place] came to bedside as well. Not able to put trach back in place. Dr Bermudez was at bedside too. The trach was removed, and a #4 shiley was put in its place by Dr. Bermudez. Patient sats holding at 94% at this point. Nurse on phone with ENT to come assess the patient.

## 2022-01-12 NOTE — PROGRESS NOTES
[facility] - [facility] Intensive Care  Critical Care - Surgery  Progress Note    Patient Name: Fareed Richard Jr.  MRN: [id number]  Admission Date: 1/3/2022  Hospital Length of Stay: 9 days  Code Status: Full Code  Attending Provider: Naeem Smalls MD  Primary Care Provider: Kodi Tubbs MD   Principal Problem: Squamous cell cancer of tongue    Subjective:     Hospital/ICU Course:  Fareed Richard Jr. is a 71 y.o. M with hx of squamous cell carcinoma, HTN, CAD s/p stents, carotid stenosis s/p carotid endarterectomy in 2018, PAD s/p b/l femoral atherectomies and other LE interventions s/p total glossectomy, tracheostomy and free flap reconstruction with ENT on 1/4.  The patient has been doing well since surgery, requiring limited pain control.  He was stepped down to the floor but stepped back after a fall out of bed.       Interval History/Significant Events:   - Trach tube backed out, 4 Mazin trach placed. Upsized to 6 by ENT on rounds.  - L Shoulder Xray shows no acute fracture    Follow-up For: Procedure(s) (LRB):  GLOSSECTOMY (N/A)  TRACHEOTOMY (N/A)  EGD, WITH PEG TUBE INSERTION (N/A)  CREATION, FREE FLAP (Right)  DISSECTION, NECK (Bilateral)    Post-Operative Day: 8 Days Post-Op    Objective:     Vital Signs (Most Recent):  Temp: 97.5 °F (36.4 °C) (01/12/22 0000)  Pulse: 81 (01/12/22 0600)  Resp: 15 (01/12/22 0600)  BP: (!) 106/53 (01/12/22 0600)  SpO2: (!) 93 % (01/12/22 0600) Vital Signs (24h Range):  Temp:  [97.2 °F (36.2 °C)-98 °F (36.7 °C)] 97.5 °F (36.4 °C)  Pulse:  [] 81  Resp:  [11-29] 15  SpO2:  [88 %-98 %] 93 %  BP: ()/(40-76) 106/53     Weight: 66.9 kg (147 lb 7.8 oz)  Body mass index is 22.43 kg/m².      Intake/Output Summary (Last 24 hours) at 1/12/2022 0612  Last data filed at 1/12/2022 0500  Gross per 24 hour   Intake 1380 ml   Output 1500 ml   Net -120 ml       Physical Exam  Constitutional:       General: He is not in acute distress.     Appearance: He is not ill-appearing,  toxic-appearing or diaphoretic.   HENT:      Head:      Comments: Cervical facial flap soft, appropriate coloring, + triphasic artery  Pulses, New Trach 6 in place, CDI     Right Ear: External ear normal.      Left Ear: External ear normal.      Nose: No congestion.      Mouth/Throat:      Mouth: Mucous membranes are moist.   Eyes:      Extraocular Movements: Extraocular movements intact.      Pupils: Pupils are equal, round, and reactive to light.   Cardiovascular:      Rate and Rhythm: Normal rate and regular rhythm.      Pulses: Normal pulses.      Heart sounds: Normal heart sounds. No murmur heard.      Pulmonary:      Effort: Pulmonary effort is normal. No respiratory distress.      Breath sounds: Normal breath sounds. No wheezing.      Comments: Trach collar, 8L 35%  Abdominal:      General: Abdomen is flat. There is no distension.      Palpations: Abdomen is soft.      Tenderness: There is no abdominal tenderness. There is no guarding.      Comments: G tube in place  Musculoskeletal:         General: No swelling.      Cervical back: Normal range of motion and neck supple. Tenderness present. No rigidity.      Comments: Donor Site: R thigh incision CDI, no signs of hematoma. R DP pulses intact. Drains with minimal SS output   Skin:     General: Skin is warm and dry.      Comments: Abrasion left hand, left forearm, left arm. Well bandaged.   Neurological:      General: No focal deficit present.      Mental Status: He is alert and oriented to person, place, and time.   Psychiatric:         Judgment: Judgment normal.         Vents:  Oxygen Concentration (%): 35 (01/12/22 0200)    Lines/Drains/Airways     Drain                 Gastrostomy/Enterostomy 01/04/22 Percutaneous endoscopic gastrostomy (PEG) LUQ 8 days         Closed/Suction Drain 01/04/22 1244 Right;Anterior Thigh Bulb 15 Fr. 7 days         Closed/Suction Drain 01/04/22 1246 Right;Anterior Thigh Bulb 15 Fr. 7 days         Closed/Suction Drain 01/04/22  1450 Left Neck Bulb 15 Fr. 7 days         Closed/Suction Drain 01/04/22 1450 Right Neck Bulb 15 Fr. 7 days         Closed/Suction Drain 01/04/22 1450 Right Neck Bulb 15 Fr. 7 days          Airway                 Surgical Airway 01/04/22 1546 Shiley Cuffed 7 days          Peripheral Intravenous Line                 Peripheral IV - Single Lumen 01/08/22 0416 22 G Anterior;Distal;Left Upper Arm 4 days         Peripheral IV - Single Lumen 01/09/22 1630 18 G Anterior;Distal;Left Forearm 2 days                Significant Labs:    CBC/Anemia Profile:  Recent Labs   Lab 01/10/22  0629 01/11/22  0238 01/12/22  0510   WBC  --  7.86 9.87   HGB  --  7.8* 8.2*   HCT 26* 25.0* 26.0*   PLT  --  298 370   MCV  --  95 96   RDW  --  14.6* 14.7*        Chemistries:  Recent Labs   Lab 01/11/22  0238 01/12/22  0510   * 135*   K 3.9 3.8   CL 98 99   CO2 27 29   BUN 23 21   CREATININE 0.6 0.6   CALCIUM 9.3 9.2   MG 2.1 2.0   PHOS 4.9* 4.5       All pertinent labs within the past 24 hours have been reviewed.    Significant Imaging:  I have reviewed all pertinent imaging results/findings within the past 24 hours.    Assessment/Plan:     * Squamous cell cancer of tongue  Fareed Richard Jr. is a 71 y.o. M with hx of squamous cell carcinoma, HTN, CAD s/p stents, carotid stenosis s/p carotid endarterectomy, PAD s/p b/l femoral atherectomies and other LE interventions who was admitted to SICU and will undergo glossectomy and face/neck reconstruction with ENT on 1/4/21.    Neuro/Psych:  - No sedation  - Pain controlled: scheduled tylenol, oxycodone/dilaudid prn     #alcohol use  - patient reports drinking 3-4 beers per night, last drink was 1/1/22  - Thiamine/Folate  - Out of window for withdrawal, delirium tremens    CV  #CAD s/p stents  #Carotid stenosis s/p endarterectomy  #PVS s/p multiple LE interventions including b/l femoral arthetectomies  - continue atorv, ASA and restarted plavix 1/11/22    #HTN  - Home meds: continue Amlodipine  10, Metoprolol- BID, Losartan 100. Holding hydralazine   - Metoprolol 200 BID since 1/9/22, HTN better controlled     Pulm  #COPD  #Former smoker  - venotlin inhaler  - duonebs prn    #Tracheostomy 1/4  - Trach replaced 1/12/22, 6 Mazin  - on trach collar 8L / 35% humidified O2, wean as tolerated  - mucomyst for increased secretions  - Will consider cuff trach if further issues maintaining trach    GI  - Nutrition consult  -F: mIVFs off, stopped 250 FW flushes  -E: replete prn  -N: TF 50 ml/hr, goal  - Continue bowel regimen  - Swallow study with SLP today    Renal  No active issues  - BUN/Cr 21/0.6  - UOp adequate, 1.4 L last 24 hr    Heme/Onc  #Squamous Cell Carcinoma of tongue   #s/p total glossectomy, tracheostomy and free flap reconstruction  - q2h flap checks, arterial doppler without issue    Musculoskeletal  #Should pain, left  - 3-way X-ray negative for fracture 1/11/22    ID  - Finished Unasyn course  - Stable WBC    Endo  - no active issues         PPx:   Feeding: NPO, tube feeds at goal.  Analgesia/Sedation: well controlled: tylenol  Thromboembolic prevention: lovenox  HOB >30: yes  Stress Ulcer ppx: no   Glucose control: Critical care goal 140-180 g/dl, ISS    Lines/Drains/Airway: trach, PIV, RACHEL x 5:  2 right neck, 1 left neck and 2 right leg, PEG      Dispo/Code Status/Palliative:   -- Continue SICU care / Full Code          Critical care was time spent personally by me on the following activities: development of treatment plan with patient or surrogate and bedside caregivers, discussions with consultants, evaluation of patient's response to treatment, examination of patient, ordering and performing treatments and interventions, ordering and review of laboratory studies, ordering and review of radiographic studies, pulse oximetry, re-evaluation of patient's condition.  This critical care time did not overlap with that of any other provider or involve time for any procedures.     Jabari  MD Aidan  Critical Care - Surgery  [facility] - [facility]

## 2022-01-12 NOTE — PROGRESS NOTES
Dr. Miller notified and to bedside for bloody drainage noted @ PEG tube site. Site cleansed and gauze dressing in place, WCTM at this time.

## 2022-01-12 NOTE — PLAN OF CARE
Recommendations    1. Continue Impact Peptide 1.5 @ 50 mL/hr - 1800 kcal, 113 g protein, and 924 mL free water    - Bolus: Impact Peptide 1.5 5 cans/day - 1875 kcal, 118 g protein, and 965 mL free water     2. TF for discharge: Isosource 1.5 advancing as tolerated until goal of 50 mL/hr providing pt with 1800 kcal, 82 g protein, and 917 mL free water    - Bolus: Isosource 1.5 5 cans/day - 1875 kcal, 85 g protein, and 955 mL free water     3. Monitor and follow-up    Goals: Pt to tolerate TF @ goal  Nutrition Goal Status: goal met  Communication of AQUILES Recs:  (POC)

## 2022-01-12 NOTE — PT/OT/SLP PROGRESS
"Physical Therapy  Co-Treatment with OT    Patient Name:  Fareed Richard Jr.   MRN:  [id number]    Recommendations:     Discharge Recommendations:  rehabilitation facility   Discharge Equipment Recommendations: none   Barriers to discharge: Decreased caregiver support family will not be able to assist at current functional level.     Assessment:     Fareed Richard Jr. is a 71 y.o. male admitted with a medical diagnosis of Squamous cell cancer of tongue.  He presents with the following impairments/functional limitations:  impaired endurance,weakness,impaired functional mobilty,gait instability,impaired balance,decreased safety awareness,decreased lower extremity function pt tolerated treatment better being able to gait train farther. Pt will benefit from cont skilled PT 4x/wk to progress physically. Pt will need inpt rehab when medically stable to maximize rehab potential.       Rehab Prognosis: Good; patient would benefit from acute skilled PT services to address these deficits and reach maximum level of function.    Recent Surgery: Procedure(s) (LRB):  GLOSSECTOMY (N/A)  TRACHEOTOMY (N/A)  EGD, WITH PEG TUBE INSERTION (N/A)  CREATION, FREE FLAP (Right)  DISSECTION, NECK (Bilateral) 8 Days Post-Op    Plan:     During this hospitalization, patient to be seen 4 x/week to address the identified rehab impairments via gait training,therapeutic activities,therapeutic exercises,neuromuscular re-education and progress toward the following goals:    · Plan of Care Expires:  02/11/22    Subjective     Chief Complaint: pt c/o feeling "whoozy" during treatment.   Patient/Family Comments/goals: to get better and go home.   Pain/Comfort:  · Pain Rating 1: 6/10 (L shoulder)  · Pain Addressed 1: Distraction  · Pain Rating Post-Intervention 1: 6/10 (L shoulder)      Objective:     Communicated with nurse prior to session.  Patient found supine with telemetry,pulse ox (continuous),blood pressure cuff,Tracheostomy,oxygen,RACHEL drain,PEG Tube " "upon PT entry to room.     General Precautions: Standard, fall   Orthopedic Precautions:N/A   Braces:    Respiratory Status: trach collar 35% 7L     Functional Mobility:  · Bed Mobility:   Pt needed verbal cues for hand placement and sequencing for functional mobility.   · Rolling Right: stand by assistance  · Supine to Sit: stand by assistance  ·   · Transfers:     · Sit to Stand:  contact guard assistance with rolling walker  ·   · Gait: pt received gait training ~ 12 ft then 18 ft (30 ft total)  with sitting rest period between distance ambulated. pt had O2 intact , used RW and was CGA. Pt O2 was 40% and 15 L per respiratory for gait training.       Due to pt complex medical condition, the skill of 2 licensed therapists is needed to maximize treatment session and progression towards goals.     AM-PAC 6 CLICK MOBILITY  Turning over in bed (including adjusting bedclothes, sheets and blankets)?: 3  Sitting down on and standing up from a chair with arms (e.g., wheelchair, bedside commode, etc.): 3  Moving from lying on back to sitting on the side of the bed?: 3  Moving to and from a bed to a chair (including a wheelchair)?: 3  Need to walk in hospital room?: 3  Climbing 3-5 steps with a railing?: 1  Basic Mobility Total Score: 16       Therapeutic Activities and Exercises:   pt and wife received verbal instructions in PT POC and wife verbally expressed understanding of such and pt acknowledged understanding with head nod "yes"     Patient left up in chair with all lines intact, call button in reach, RN notified and wife present..    GOALS:   Multidisciplinary Problems     Physical Therapy Goals        Problem: Physical Therapy Goal    Goal Priority Disciplines Outcome Goal Variances Interventions   Physical Therapy Goal     PT, PT/OT Ongoing, Progressing     Description: Goals to be met by: 2022    Patient will increase functional independence with mobility by performin. Supine to sit with Supervision " -not met  2. Sit to stand transfer with Supervision using RW -not met  3. Gait  x 150 feet with Supervision using Rolling Walker. -not met  4. Ascend/descend 3 stair with right Handrails CGA -not met  5. Stand for 3 minutes with Supervision using RW -not met                       Time Tracking:     PT Received On: 01/12/22  PT Start Time: 0934     PT Stop Time: 1005  PT Total Time (min): 31 min     Billable Minutes: Gait Training 31 min       PT/PTA: PT     PTA Visit Number: 0     01/12/2022

## 2022-01-12 NOTE — PLAN OF CARE
Problem: Physical Therapy Goal  Goal: Physical Therapy Goal  Description: Goals to be met by: 2022    Patient will increase functional independence with mobility by performin. Supine to sit with Supervision -not met  2. Sit to stand transfer with Supervision using RW -not met  3. Gait  x 150 feet with Supervision using Rolling Walker. -not met  4. Ascend/descend 3 stair with right Handrails CGA -not met  5. Stand for 3 minutes with Supervision using RW -not met      Outcome: Ongoing, Progressing   Goals remain appropriate. 2022

## 2022-01-12 NOTE — PT/OT/SLP PROGRESS
Occupational Therapy  Co- Treatment    Name: Fareed Richard Jr.  MRN: [id number]  Admitting Diagnosis:  Squamous cell cancer of tongue  8 Days Post-Op    Recommendations:     Discharge Recommendations: rehabilitation facility    Assessment:     Fareed Richard Jr. is a 71 y.o. male with a medical diagnosis of Squamous cell cancer of tongue.   Performance deficits affecting function are weakness,impaired endurance,impaired self care skills,impaired functional mobilty,gait instability,impaired balance,decreased upper extremity function,pain.   Pt tolerated session fairly well.  However, OT updated d/c recs this date to REHAB. Pt may benefit from post acute therapy to increase functional independence and safety prior to return home.   Rehab Prognosis:  Good; patient would benefit from acute skilled OT services to address these deficits and reach maximum level of function.       Plan:     Patient to be seen 4 x/week to address the above listed problems via self-care/home management,therapeutic activities,therapeutic exercises  · Plan of Care Expires:    · Plan of Care Reviewed with: patient,spouse    Subjective   Pt agreeable to therapy.   Pt reports poor night's sleep.   Pain/Comfort:  · Pain Rating 1: 6/10  · Location - Side 1: Left  · Location 1: shoulder  · Pain Addressed 1: Reposition,Distraction,Pre-medicate for activity,Nurse notified    Objective:     Communicated with: nsg prior to session.  Patient found supine in bed with tele, pulse ox, BP cuff on L LE, RACHEL drains and trach collar 35% 7 LPM and RT arriving to room and placed pt on 35% 15 LPM for oxygen adaptor to allow for mobility.   Saturation remained grossly 91% during activity.     Co-tx completed this date to optimize functional performance and safety given impaired tolerance for activity in setting of ICU.     General Precautions: Standard, fall,NPO     Occupational Performance:     Bed Mobility:    · Supine>sit with SBA with increased time.     Functional  Mobility/Transfers:  Sit>stand from bed and chair with CGA     Activities of Daily Living:  · Feeding: NPO  · G/H: unable to tolerate stand and completed g/h skill seated with B UE's engaged in functional task. Set-up wash face/hands and MIN A comb hair.    [facility] 6 Click ADL: 12    Treatment & Education:  Pt ambulated to sink to initiate standing g/h task with RW; however, pt needing to sit due reports of lightheadedness from recently talking meds. Pt completed task in sitting.   After seated rest break, pt able to complete further mobility in room with RW.   Education provided re: OT POC and safety with functional mobility/ADl skills.   Pt/spouse verb understanding of continued UE/LE HEP.     Patient left up in chair with all lines intact, call button in reach, nsg notified and spouse presentEducation:      GOALS:   Multidisciplinary Problems     Occupational Therapy Goals        Problem: Occupational Therapy Goal    Goal Priority Disciplines Outcome Interventions   Occupational Therapy Goal     OT, PT/OT Ongoing, Progressing    Description: Goals to be met by: 7 days 1/18/22     Patient will increase functional independence with ADLs by performing:    Pt to complete standing g/h skills with SBA  Pt to complete UE dressing with DARRYL    Pt to complete LE dressing with MIN A   Pt to complete toileting with SBA  Pt to complete t/f to commode, bed and chair with SBA                     Time Tracking:     OT Date of Treatment: 01/12/22  OT Start Time: 0932  OT Stop Time: 1005  OT Total Time (min): 33 min    Billable Minutes:Self Care/Home Management 16  Therapeutic Activity 17    OT/NELLY: OT          1/12/2022

## 2022-01-12 NOTE — PROGRESS NOTES
[facility]  Adult Nutrition  Progress Note    SUMMARY       Recommendations    1. Continue Impact Peptide 1.5 @ 50 mL/hr - 1800 kcal, 113 g protein, and 924 mL free water    - Bolus: Impact Peptide 1.5 5 cans/day - 1875 kcal, 118 g protein, and 965 mL free water     2. TF for discharge: Isosource 1.5 advancing as tolerated until goal of 50 mL/hr providing pt with 1800 kcal, 82 g protein, and 917 mL free water    - Bolus: Isosource 1.5 5 cans/day - 1875 kcal, 85 g protein, and 955 mL free water     3. Monitor and follow-up    Goals: Pt to tolerate TF @ goal  Nutrition Goal Status: goal met  Communication of RD Recs:  (POC)    Assessment and Plan    Nutrition Problem:  Severe Protein-Calorie Malnutrition  Malnutrition in the context of Chronic Illness/Injury     Related to (etiology):  Inability to consume sufficient energy     Signs and Symptoms (as evidenced by):  Energy Intake: </= 75% of estimated energy   Body Fat Depletion: moderate depletion of orbitals and triceps   Muscle Mass Depletion: moderate and severe depletion of temples, clavicle region, scapular region, interosseous muscle and lower extremities   Weight Loss: 14% x 1 month  Fluid Accumulation: mild     Interventions(treatment strategy):  Collaboration with other providers  Enteral nutrition     Nutrition Diagnosis Status:  Continues       Malnutrition Assessment  Malnutrition Type: chronic illness  Skin (Micronutrient): bruised       Weight Loss (Malnutrition): greater than 5% in 1 month  Energy Intake (Malnutrition): less than or equal to 75% for greater than or equal to 1 month   Orbital Region (Subcutaneous Fat Loss): moderate depletion  Upper Arm Region (Subcutaneous Fat Loss): moderate depletion   Judaism Region (Muscle Loss): severe depletion  Clavicle Bone Region (Muscle Loss): severe depletion  Clavicle and Acromion Bone Region (Muscle Loss): severe depletion  Scapular Bone Region (Muscle Loss): moderate  "depletion  Dorsal Hand (Muscle Loss): severe depletion  Patellar Region (Muscle Loss): severe depletion  Anterior Thigh Region (Muscle Loss): severe depletion  Posterior Calf Region (Muscle Loss): severe depletion   Edema (Fluid Accumulation): 2-->mild             Reason for Assessment    Reason For Assessment: RD follow-up  Diagnosis: cancer diagnosis/related complications  Relevant Medical History: squamous cell carcinoma, HTN, CAD s/p stents, HLD, COPD    General Information Comments: S/p glossectomy/neck dissection/free flap/ trach/PEG on 1/4. Tolerating TFs. Denies n/v/d/c. Also denies cramping. NFPE completed 1/12; moderate-severe wasting seen. Bruising noted. Pt continues with severe malnutrition in the context of chronic illness per ASPEN guidelines.    Nutrition Discharge Planning: Adequate nutrition via TF through PEG    Nutrition Risk Screen    Nutrition Risk Screen: tube feeding or parenteral nutrition    Nutrition/Diet History    Patient Reported Diet/Restrictions/Preferences: general  Spiritual, Cultural Beliefs, Confucianism Practices, Values that Affect Care: no  Food Allergies: NKFA  Factors Affecting Nutritional Intake: difficulty/impaired swallowing,NPO    Anthropometrics    Temp: 97.8 °F (36.6 °C)  Height: 5' 8" (172.7 cm)  Height (inches): 68 in  Weight Method: Bed Scale  Weight: 66.9 kg (147 lb 7.8 oz)  Weight (lb): 147.49 lb  Ideal Body Weight (IBW), Male: 154 lb  % Ideal Body Weight, Male (lb): 82.6 %  BMI (Calculated): 22.4  BMI Grade: 18.5-24.9 - normal  Weight Loss: unintentional       Lab/Procedures/Meds    Pertinent Labs Reviewed: reviewed  Pertinent Labs Comments: Na 135, glu 140  Pertinent Medications Reviewed: reviewed  Pertinent Medications Comments: amlodipine, statin, docusate, enoxaparin, folic acid, lactated ringers, losartan, metoprolol, thiamine    Estimated/Assessed Needs    Weight Used For Calorie Calculations: 66.9 kg (147 lb 7.8 oz)  Energy Calorie Requirements (kcal): " 1673-2007 kcal/day  Energy Need Method: Kcal/kg (25-30)  Protein Requirements:  g/day (1.2-1.5 g/day)  Weight Used For Protein Calculations: 66.9 kg (147 lb 7.8 oz)  Fluid Requirements (mL): 1 mL/kcal or per MD  Estimated Fluid Requirement Method: RDA Method  RDA Method (mL): 1673       Nutrition Prescription Ordered    Current Diet Order: NPO  Current Nutrition Support Formula Ordered: Impact Peptide 1.5  Current Nutrition Support Rate Ordered: 50 (ml)  Current Nutrition Support Frequency Ordered: mL/hr x 24 hrs    Evaluation of Received Nutrient/Fluid Intake    Enteral Calories (kcal): 1800  Enteral Protein (gm): 113  Enteral (Free Water) Fluid (mL): 924  % Kcal Needs: 100  % Protein Needs: 112  I/O: +6.9L since admit  Energy Calories Required: meeting needs  Protein Required: meeting needs  Fluid Required: meeting needs  Comments: LBM: 1/9  Tolerance: tolerating  % Intake of Estimated Energy Needs: 75 - 100 %  % Meal Intake: NPO    Nutrition Risk    Level of Risk/Frequency of Follow-up: low     Monitor and Evaluation    Food and Nutrient Intake: energy intake,enteral nutrition intake  Food and Nutrient Adminstration: enteral and parenteral nutrition administration  Anthropometric Measurements: weight,weight change  Biochemical Data, Medical Tests and Procedures: electrolyte and renal panel,gastrointestinal profile,glucose/endocrine profile,inflammatory profile,lipid profile  Nutrition-Focused Physical Findings: overall appearance,extremities, muscles and bones,skin     Nutrition Follow-Up    RD Follow-up?: Yes

## 2022-01-12 NOTE — PROGRESS NOTES
[facility]  Otorhinolaryngology-Head & Neck Surgery  Progress Note    Subjective:     Post-Op Info:  Procedure(s) (LRB):  GLOSSECTOMY (N/A)  TRACHEOTOMY (N/A)  EGD, WITH PEG TUBE INSERTION (N/A)  CREATION, FREE FLAP (Right)  DISSECTION, NECK (Bilateral)   8 Days Post-Op  Hospital Day: 10     Interval History: Trach became dislodged early this morning with associated desaturations. 4-0 cuffed trach replaced by SICU resident. Still with saturations in the 80s. ENT at bedside after event to evaluate patient. 6-0 cuffless replaced and saturations improved. Patient with copious oropharyngeal secretions.       Medications:  Continuous Infusions:   sodium chloride 0.9% Stopped (01/06/22 0636)     Scheduled Meds:   acetaminophen  650 mg Per G Tube Q6H    acetylcysteine 100 mg/ml (10%)  4 mL Nebulization TID WAKE    amLODIPine  10 mg Per G Tube Daily    aspirin  81 mg Per G Tube Daily    atorvastatin  20 mg Per G Tube Daily    bacitracin zinc   Topical (Top) BID    clopidogreL  75 mg Per G Tube Daily    docusate  100 mg Per G Tube BID    enoxaparin  40 mg Subcutaneous Daily    folic acid  1 mg Per G Tube Daily    losartan  50 mg Per G Tube Daily    metoprolol tartrate  200 mg Per G Tube BID    mupirocin   Nasal BID    polyethylene glycol  17 g Per G Tube BID    thiamine  100 mg Per G Tube Daily     PRN Meds:albuterol-ipratropium, bisacodyL, hydrALAZINE, ondansetron, oxyCODONE, oxyCODONE, sodium chloride 0.9%     Review of patient's allergies indicates:  No Known Allergies  Objective:     Vital Signs (24h Range):  Temp:  [97.2 °F (36.2 °C)-98 °F (36.7 °C)] 97.5 °F (36.4 °C)  Pulse:  [] 88  Resp:  [11-29] 23  SpO2:  [88 %-99 %] 96 %  BP: ()/(40-76) 117/58       Lines/Drains/Airways     Drain                 Gastrostomy/Enterostomy 01/04/22 Percutaneous endoscopic gastrostomy (PEG) LUQ 8 days         Closed/Suction Drain 01/04/22 1244 Right;Anterior Thigh Bulb 15 Fr. 7 days          Closed/Suction Drain 01/04/22 1246 Right;Anterior Thigh Bulb 15 Fr. 7 days         Closed/Suction Drain 01/04/22 1450 Left Neck Bulb 15 Fr. 7 days         Closed/Suction Drain 01/04/22 1450 Right Neck Bulb 15 Fr. 7 days         Closed/Suction Drain 01/04/22 1450 Right Neck Bulb 15 Fr. 7 days          Airway                 Surgical Airway 01/04/22 1546 Shiley Cuffed 7 days          Peripheral Intravenous Line                 Peripheral IV - Single Lumen 01/08/22 0416 22 G Anterior;Distal;Left Upper Arm 4 days         Peripheral IV - Single Lumen 01/09/22 1630 18 G Anterior;Distal;Left Forearm 2 days                Physical Exam  Awake and alert, mild distress with oropharyngeal secretions (gurgling)   NC/AT, EOMI, clear sclera  Normal external nose   MMM, rosemary-tongue ALTFF in place with good color that approximates the thigh, good tugar and warm, strong arterial doppler signal, and good venous signal from implantable doppler   Neck soft, advancement flaps in place without duskiness, staples intact, JPx2 on right with s/s output, RACHEL x 1 on left with s/s output  Right neck swelling decreasing and softer   4-0 cuffed trach in place - removed, 6-0 cuffless trach placed without difficulty and secured with sherry collar, aerating well  Normal work of breathing, on trach collar   G tube in place    Significant Labs:  CBC:   Recent Labs   Lab 01/12/22  0510   WBC 9.87   RBC 2.72*   HGB 8.2*   HCT 26.0*      MCV 96   MCH 30.1   MCHC 31.5*     CMP:   Recent Labs   Lab 01/12/22  0510   *   CALCIUM 9.2   *   K 3.8   CO2 29   CL 99   BUN 21   CREATININE 0.6       Significant Diagnostics:  I have reviewed and interpreted all pertinent imaging results/findings within the past 24 hours.       Assessment/Plan:     * Squamous cell cancer of tongue  71M with [id number] SCC of the oral tongue s/p total glossectomy, bilateral neck dissections levels I-IV, tracheostomy, and ALTFF reconstruction with G tube placement  by general surgery. Right neck swelling improving. Copious oropharyngeal secretions.     -Continue q4h flap checks  -Plavix/ASA  -low threshold to replace 6-0 cuffed trach to help with secretion management, suspect patient will need trach for long term   -appreciate SLP consult, MBSS today   -continue TFs, will transition to bolus today vs tomorrow   -pain control (scheduled tylenol, oxycodone PRN)  -trach teaching with respiratory   -PT/OT, OOB    Ppx: L40, SCDs, NIKITA hose     Dispo: PCU step down orders revoked, continue ICU care today            Annmarie Oliveira MD  Otorhinolaryngology-Head & Neck Surgery  [facility]

## 2022-01-12 NOTE — PLAN OF CARE
SW met with Pt and Pt wife at bedside. Discussed therapy recs for Rehab and provided list. Addressed questions and reported need to send referrals to obtain accepting options. The patient's wife agreeable and will review the list for preferences asap. The patient's wife informed the SW that she preferred [facility].          01/12/22 1333   Post-Acute Status   Post-Acute Authorization Placement   Post-Acute Placement Status Referrals Sent       SW sent Rehab referral to [facility] via [facility] for review. The SW will continue to follow.       Jordan Aceves LMSW  [facility]  Ext: [phone]

## 2022-01-12 NOTE — ASSESSMENT & PLAN NOTE
Fareed Richard . is a 71 y.o. M with hx of squamous cell carcinoma, HTN, CAD s/p stents, carotid stenosis s/p carotid endarterectomy, PAD s/p b/l femoral atherectomies and other LE interventions who was admitted to SICU and will undergo glossectomy and face/neck reconstruction with ENT on 1/4/21.    Neuro/Psych:  - No sedation  - Pain controlled: scheduled tylenol, oxycodone/dilaudid prn     #alcohol use  - patient reports drinking 3-4 beers per night, last drink was 1/1/22  - Thiamine/Folate  - Out of window for withdrawal, delirium tremens    CV  #CAD s/p stents  #Carotid stenosis s/p endarterectomy  #PVS s/p multiple LE interventions including b/l femoral arthetectomies  - continue atorv, ASA and restarted plavix 1/11/22    #HTN  - Home meds: continue Amlodipine 10, Metoprolol- BID, Losartan 100. Holding hydralazine  - Metoprolol 200 BID since 1/9/22, HTN better controlled     Pulm  #COPD  #Former smoker  - venotlin inhaler  - duonebs prn    #Tracheostomy 1/4  - Trach replaced 1/12/22  - on trach collar 8L / 35%humidified O2, wean as tolerated  - mucomyst for increased secretions    GI  - Nutrition consult  -F: mIVFs off, stopped 250 FW flushes  -E: replete prn  -N: TF 50 ml/hr, goal  - Continue bowel regimen    Renal  No active issues  - BUN/Cr 21/0.6  - UOp adequate, 1.4 L last 24 hr    Heme/Onc  #Squamous Cell Carcinoma of tongue   #s/p total glossectomy, tracheostomy and free flap reconstruction  - q2h flap checks, arterial doppler without issue    ID  - Finished Unasyn course  - Stable WBC    Endo  - no active issues         PPx:   Feeding: NPO, tube feeds at goal.  Analgesia/Sedation: well controlled: tylenol  Thromboembolic prevention: lovenox  HOB >30: yes  Stress Ulcer ppx: no   Glucose control: Critical care goal 140-180 g/dl, ISS    Lines/Drains/Airway: trach, PIV, RACHEL x 5:  2 right neck, 1 left neck and 2 right leg, PEG      Dispo/Code Status/Palliative:   -- Step down to PCU / Full Code

## 2022-01-12 NOTE — SUBJECTIVE & OBJECTIVE
Interval History/Significant Events:   - Trach tube backed out, unable top place 6. 4 jj placed with O2 improvement  - L Shoulder Xray for continued pain after fall    Follow-up For: Procedure(s) (LRB):  GLOSSECTOMY (N/A)  TRACHEOTOMY (N/A)  EGD, WITH PEG TUBE INSERTION (N/A)  CREATION, FREE FLAP (Right)  DISSECTION, NECK (Bilateral)    Post-Operative Day: 8 Days Post-Op    Objective:     Vital Signs (Most Recent):  Temp: 97.5 °F (36.4 °C) (01/12/22 0000)  Pulse: 81 (01/12/22 0600)  Resp: 15 (01/12/22 0600)  BP: (!) 106/53 (01/12/22 0600)  SpO2: (!) 93 % (01/12/22 0600) Vital Signs (24h Range):  Temp:  [97.2 °F (36.2 °C)-98 °F (36.7 °C)] 97.5 °F (36.4 °C)  Pulse:  [] 81  Resp:  [11-29] 15  SpO2:  [88 %-98 %] 93 %  BP: ()/(40-76) 106/53     Weight: 66.9 kg (147 lb 7.8 oz)  Body mass index is 22.43 kg/m².      Intake/Output Summary (Last 24 hours) at 1/12/2022 0612  Last data filed at 1/12/2022 0500  Gross per 24 hour   Intake 1380 ml   Output 1500 ml   Net -120 ml       Physical Exam  Constitutional:       General: He is not in acute distress.     Appearance: He is not ill-appearing, toxic-appearing or diaphoretic.   HENT:      Head:      Comments: Cervical facial flap soft, appropriate coloring, + triphasic artery  Pulses, New Trach 4 in place, CDI     Right Ear: External ear normal.      Left Ear: External ear normal.      Nose: No congestion.      Mouth/Throat:      Mouth: Mucous membranes are moist.   Eyes:      Extraocular Movements: Extraocular movements intact.      Pupils: Pupils are equal, round, and reactive to light.   Cardiovascular:      Rate and Rhythm: Normal rate and regular rhythm.      Pulses: Normal pulses.      Heart sounds: Normal heart sounds. No murmur heard.      Pulmonary:      Effort: Pulmonary effort is normal. No respiratory distress.      Breath sounds: Normal breath sounds. No wheezing.      Comments: Trach collar  Abdominal:      General: Abdomen is flat. There is no  distension.      Palpations: Abdomen is soft.      Tenderness: There is no abdominal tenderness. There is no guarding.      Comments: G tube in place   Musculoskeletal:         General: No swelling.      Cervical back: Normal range of motion and neck supple. Tenderness present. No rigidity.      Comments: Donor Site: R thigh incision CDI, no signs of hematoma. R DP pulses intact. Drains with minimal SS output   Skin:     General: Skin is warm and dry.      Comments: Abrasion left hand, left forearm, left arm. Well bandaged.   Neurological:      General: No focal deficit present.      Mental Status: He is alert and oriented to person, place, and time.   Psychiatric:         Judgment: Judgment normal.         Vents:  Oxygen Concentration (%): 35 (01/12/22 0200)    Lines/Drains/Airways     Drain                 Gastrostomy/Enterostomy 01/04/22 Percutaneous endoscopic gastrostomy (PEG) LUQ 8 days         Closed/Suction Drain 01/04/22 1244 Right;Anterior Thigh Bulb 15 Fr. 7 days         Closed/Suction Drain 01/04/22 1246 Right;Anterior Thigh Bulb 15 Fr. 7 days         Closed/Suction Drain 01/04/22 1450 Left Neck Bulb 15 Fr. 7 days         Closed/Suction Drain 01/04/22 1450 Right Neck Bulb 15 Fr. 7 days         Closed/Suction Drain 01/04/22 1450 Right Neck Bulb 15 Fr. 7 days          Airway                 Surgical Airway 01/04/22 1546 Shiley Cuffed 7 days          Peripheral Intravenous Line                 Peripheral IV - Single Lumen 01/08/22 0416 22 G Anterior;Distal;Left Upper Arm 4 days         Peripheral IV - Single Lumen 01/09/22 1630 18 G Anterior;Distal;Left Forearm 2 days                Significant Labs:    CBC/Anemia Profile:  Recent Labs   Lab 01/10/22  0629 01/11/22 0238 01/12/22  0510   WBC  --  7.86 9.87   HGB  --  7.8* 8.2*   HCT 26* 25.0* 26.0*   PLT  --  298 370   MCV  --  95 96   RDW  --  14.6* 14.7*        Chemistries:  Recent Labs   Lab 01/11/22  0238 01/12/22  0510   * 135*   K 3.9 3.8   CL  98 99   CO2 27 29   BUN 23 21   CREATININE 0.6 0.6   CALCIUM 9.3 9.2   MG 2.1 2.0   PHOS 4.9* 4.5       All pertinent labs within the past 24 hours have been reviewed.    Significant Imaging:  I have reviewed all pertinent imaging results/findings within the past 24 hours.

## 2022-01-12 NOTE — ASSESSMENT & PLAN NOTE
71M with [id number] SCC of the oral tongue s/p total glossectomy, bilateral neck dissections levels I-IV, tracheostomy, and ALTFF reconstruction with G tube placement by general surgery. Right neck swelling improving. Copious oropharyngeal secretions.     -Continue q4h flap checks  -Plavix/ASA  -low threshold to replace 6-0 cuffed trach to help with secretion management, suspect patient will need trach for long term   -appreciate SLP consult, MBSS today   -continue TFs, will transition to bolus today vs tomorrow   -pain control (scheduled tylenol, oxycodone PRN)  -trach teaching with respiratory   -PT/OT, OOB    Ppx: L40, SCDs, NIKITA hose     Dispo: PCU step down orders revoked, continue ICU care today

## 2022-01-12 NOTE — SUBJECTIVE & OBJECTIVE
Interval History: Trach became dislodged early this morning with associated desaturations. 4-0 cuffed trach replaced by SICU resident. Still with saturations in the 80s. ENT at bedside after event to evaluate patient. 6-0 cuffless replaced and saturations improved. Patient with copious oropharyngeal secretions.       Medications:  Continuous Infusions:   sodium chloride 0.9% Stopped (01/06/22 0636)     Scheduled Meds:   acetaminophen  650 mg Per G Tube Q6H    acetylcysteine 100 mg/ml (10%)  4 mL Nebulization TID WAKE    amLODIPine  10 mg Per G Tube Daily    aspirin  81 mg Per G Tube Daily    atorvastatin  20 mg Per G Tube Daily    bacitracin zinc   Topical (Top) BID    clopidogreL  75 mg Per G Tube Daily    docusate  100 mg Per G Tube BID    enoxaparin  40 mg Subcutaneous Daily    folic acid  1 mg Per G Tube Daily    losartan  50 mg Per G Tube Daily    metoprolol tartrate  200 mg Per G Tube BID    mupirocin   Nasal BID    polyethylene glycol  17 g Per G Tube BID    thiamine  100 mg Per G Tube Daily     PRN Meds:albuterol-ipratropium, bisacodyL, hydrALAZINE, ondansetron, oxyCODONE, oxyCODONE, sodium chloride 0.9%     Review of patient's allergies indicates:  No Known Allergies  Objective:     Vital Signs (24h Range):  Temp:  [97.2 °F (36.2 °C)-98 °F (36.7 °C)] 97.5 °F (36.4 °C)  Pulse:  [] 88  Resp:  [11-29] 23  SpO2:  [88 %-99 %] 96 %  BP: ()/(40-76) 117/58       Lines/Drains/Airways     Drain                 Gastrostomy/Enterostomy 01/04/22 Percutaneous endoscopic gastrostomy (PEG) LUQ 8 days         Closed/Suction Drain 01/04/22 1244 Right;Anterior Thigh Bulb 15 Fr. 7 days         Closed/Suction Drain 01/04/22 1246 Right;Anterior Thigh Bulb 15 Fr. 7 days         Closed/Suction Drain 01/04/22 1450 Left Neck Bulb 15 Fr. 7 days         Closed/Suction Drain 01/04/22 1450 Right Neck Bulb 15 Fr. 7 days         Closed/Suction Drain 01/04/22 1450 Right Neck Bulb 15 Fr. 7 days          Airway                  Surgical Airway 01/04/22 1546 Shiley Cuffed 7 days          Peripheral Intravenous Line                 Peripheral IV - Single Lumen 01/08/22 0416 22 G Anterior;Distal;Left Upper Arm 4 days         Peripheral IV - Single Lumen 01/09/22 1630 18 G Anterior;Distal;Left Forearm 2 days                Physical Exam  Awake and alert, mild distress with oropharyngeal secretions (gurgling)   NC/AT, EOMI, clear sclera  Normal external nose   MMM, rosemary-tongue ALTFF in place with good color that approximates the thigh, good tugar and warm, strong arterial doppler signal, and good venous signal from implantable doppler   Neck soft, advancement flaps in place without duskiness, staples intact, JPx2 on right with s/s output, RACHEL x 1 on left with s/s output  Right neck swelling decreasing and softer   4-0 cuffed trach in place - removed, 6-0 cuffless trach placed without difficulty and secured with sherry collar, aerating well  Normal work of breathing, on trach collar   G tube in place    Significant Labs:  CBC:   Recent Labs   Lab 01/12/22  0510   WBC 9.87   RBC 2.72*   HGB 8.2*   HCT 26.0*      MCV 96   MCH 30.1   MCHC 31.5*     CMP:   Recent Labs   Lab 01/12/22  0510   *   CALCIUM 9.2   *   K 3.8   CO2 29   CL 99   BUN 21   CREATININE 0.6       Significant Diagnostics:  I have reviewed and interpreted all pertinent imaging results/findings within the past 24 hours.

## 2022-01-12 NOTE — PROCEDURES
Modified Barium Swallow    Patient Name:  Fareed Richard Jr.   MRN:  [id number]      Recommendations:     Recommendations:                General Recommendations:  Dysphagia therapy and Speech/language therapy  Diet recommendations:  NPO, NPO   Aspiration Precautions: Continue alternate means of nutrition, Frequent oral care and Strict aspiration precautions   General Precautions: Standard, aspiration,fall,NPO  Communication strategies:  provide increased time to answer and go to room if call light pushed    Referral     Reason for Referral  Patient was referred for a Modified Barium Swallow Study to assess the efficiency of his/her swallow function, rule out aspiration and make recommendations regarding safe dietary consistencies, effective compensatory strategies, and safe eating environment.     Diagnosis: Squamous cell cancer of tongue       History:     Past Medical History:   Diagnosis Date    Cancer     skin to Rt forearm and face    COPD (chronic obstructive pulmonary disease)     Hyperlipidemia     Hypertension     Pseudoaneurysm        Objective:     Current Respiratory Status: 01/12/22    Alert: yes    Cooperative: yes    Follows Directions: yes    Visualization  · Patient was seen in the lateral view  · Tracheostomy tube visualized in place/ One Way Speaking Valve present  · Supplement O2 in place via trach collar    Oral Peripheral Examination  · Oral Musculature:  (unable to assess)    Consistencies Assessed  · Thin 5mL (via syringe)  · Nectar thick 5mL (via syringe)    Oral Preparation/Oral Phase  · Syringe required to move liquid bolus posteriorly  · Oral residue present post-trials    Pharyngeal Phase   · Significantly reduced hyoid excursion and laryngeal elevation with absent epiglottic inversion  · Premature pooling in valleculae with laryngeal penetration prior to swallow with both thin and nectar-thick liquids  · Aspiration of penetrated material during the swallow without spontaneous sensory  response  · Cued throat clearing/coughing was moderately effective in clearing airway though repeated throat clearing required  · SLP orally suctioned majority of bolus following trials  · *secretions mixed with barium expelled from trach following study    Cervical Esophageal Phase  · UES appeared to accommodate all bolus types without stasis or retrograde movement observed     Assessment:     Impressions  · Patient demonstrates severe oropharyngeal dysphagia characterized by reduced pharyngeal swallow strength s/p glossectomy and trach. Patient with silent aspiration of both thin and nectar-thick liquids. Cued throat clear/cough was moderately effective in clearing airway. Patient at significant risk of aspiration with all PO intake at this time.     Prognosis: Guarded    Barriers:  · S/p total glossectomy    Plan  · Continue strict NPO status and trial dysphagia therapy to improve swallow function.     Education  Results were discussed with patient and spouse. Results were discussed with Medical Team who was in agreement with plan.     Goals:   Multidisciplinary Problems     SLP Goals        Problem: SLP Goal    Goal Priority Disciplines Outcome   SLP Goal     SLP Ongoing, Progressing   Description: Speech Language Pathology Goals  Goals expected to be met by 1/12    1. Pt will tolerate PMSV for 5 minutes with >92% spO2 to increase phonation, respiration and swallowing aspects  2. Pt/family will don/doff PMSV x1 during session with min cues  3. Pt will vocalize with PMSV in place to increase verbal expression.                          Plan:   · Patient to be seen:  Therapy Frequency: 4 x/week   · Plan of Care expires:  02/01/22  · Plan of Care reviewed with:  patient,spouse        Discharge recommendations:  rehabilitation facility   Barriers to Discharge:  None    Time Tracking:   SLP Treatment Date:   01/12/22  Speech Start Time:  1432  Speech Stop Time:  1450     Speech Total Time (min):  18 min    Jason  Madelin CCC-SLP  Speech-Language Pathology  Pager: [phone]   01/12/2022

## 2022-01-13 PROBLEM — K14.8 MASS OF TONGUE: Status: RESOLVED | Noted: 2021-12-15 | Resolved: 2022-01-13

## 2022-01-13 PROBLEM — R59.0 SUBMENTAL ADENOPATHY: Status: RESOLVED | Noted: 2021-12-15 | Resolved: 2022-01-13

## 2022-01-13 LAB
ALLENS TEST: ABNORMAL
ANION GAP SERPL CALC-SCNC: 12 MMOL/L (ref 8–16)
BASOPHILS # BLD AUTO: 0.06 K/UL (ref 0–0.2)
BASOPHILS NFR BLD: 0.8 % (ref 0–1.9)
BUN SERPL-MCNC: 20 MG/DL (ref 8–23)
CALCIUM SERPL-MCNC: 8.7 MG/DL (ref 8.7–10.5)
CHLORIDE SERPL-SCNC: 103 MMOL/L (ref 95–110)
CO2 SERPL-SCNC: 22 MMOL/L (ref 23–29)
CREAT SERPL-MCNC: 0.5 MG/DL (ref 0.5–1.4)
DELSYS: ABNORMAL
DIFFERENTIAL METHOD: ABNORMAL
EOSINOPHIL # BLD AUTO: 0.5 K/UL (ref 0–0.5)
EOSINOPHIL NFR BLD: 6.2 % (ref 0–8)
ERYTHROCYTE [DISTWIDTH] IN BLOOD BY AUTOMATED COUNT: 14.8 % (ref 11.5–14.5)
EST. GFR  (AFRICAN AMERICAN): >60 ML/MIN/1.73 M^2
EST. GFR  (NON AFRICAN AMERICAN): >60 ML/MIN/1.73 M^2
FIO2: 35
FLOW: 8
GLUCOSE SERPL-MCNC: 114 MG/DL (ref 70–110)
HCO3 UR-SCNC: 29.7 MMOL/L (ref 24–28)
HCT VFR BLD AUTO: 26.8 % (ref 40–54)
HGB BLD-MCNC: 8 G/DL (ref 14–18)
IMM GRANULOCYTES # BLD AUTO: 0.08 K/UL (ref 0–0.04)
IMM GRANULOCYTES NFR BLD AUTO: 1.1 % (ref 0–0.5)
LYMPHOCYTES # BLD AUTO: 0.6 K/UL (ref 1–4.8)
LYMPHOCYTES NFR BLD: 8 % (ref 18–48)
MAGNESIUM SERPL-MCNC: 2.1 MG/DL (ref 1.6–2.6)
MCH RBC QN AUTO: 29.9 PG (ref 27–31)
MCHC RBC AUTO-ENTMCNC: 29.9 G/DL (ref 32–36)
MCV RBC AUTO: 100 FL (ref 82–98)
MODE: ABNORMAL
MONOCYTES # BLD AUTO: 1.2 K/UL (ref 0.3–1)
MONOCYTES NFR BLD: 16.3 % (ref 4–15)
NEUTROPHILS # BLD AUTO: 5 K/UL (ref 1.8–7.7)
NEUTROPHILS NFR BLD: 67.6 % (ref 38–73)
NRBC BLD-RTO: 0 /100 WBC
PCO2 BLDA: 42.4 MMHG (ref 35–45)
PH SMN: 7.45 [PH] (ref 7.35–7.45)
PHOSPHATE SERPL-MCNC: 4.7 MG/DL (ref 2.7–4.5)
PLATELET # BLD AUTO: 320 K/UL (ref 150–450)
PMV BLD AUTO: 8.9 FL (ref 9.2–12.9)
PO2 BLDA: 72 MMHG (ref 80–100)
POC BE: 6 MMOL/L
POC SATURATED O2: 95 % (ref 95–100)
POC TCO2: 31 MMOL/L (ref 23–27)
POTASSIUM SERPL-SCNC: 4 MMOL/L (ref 3.5–5.1)
RBC # BLD AUTO: 2.68 M/UL (ref 4.6–6.2)
SAMPLE: ABNORMAL
SARS-COV-2 RDRP RESP QL NAA+PROBE: NEGATIVE
SITE: ABNORMAL
SODIUM SERPL-SCNC: 137 MMOL/L (ref 136–145)
WBC # BLD AUTO: 7.37 K/UL (ref 3.9–12.7)

## 2022-01-13 PROCEDURE — 25000003 PHARM REV CODE 250: Performed by: STUDENT IN AN ORGANIZED HEALTH CARE EDUCATION/TRAINING PROGRAM

## 2022-01-13 PROCEDURE — 99233 PR SUBSEQUENT HOSPITAL CARE,LEVL III: ICD-10-PCS | Mod: ,,, | Performed by: STUDENT IN AN ORGANIZED HEALTH CARE EDUCATION/TRAINING PROGRAM

## 2022-01-13 PROCEDURE — 97530 THERAPEUTIC ACTIVITIES: CPT

## 2022-01-13 PROCEDURE — 25000003 PHARM REV CODE 250: Performed by: OTOLARYNGOLOGY

## 2022-01-13 PROCEDURE — 27000221 HC OXYGEN, UP TO 24 HOURS

## 2022-01-13 PROCEDURE — 25000003 PHARM REV CODE 250

## 2022-01-13 PROCEDURE — 80048 BASIC METABOLIC PNL TOTAL CA: CPT | Performed by: STUDENT IN AN ORGANIZED HEALTH CARE EDUCATION/TRAINING PROGRAM

## 2022-01-13 PROCEDURE — 20000000 HC ICU ROOM

## 2022-01-13 PROCEDURE — 92507 TX SP LANG VOICE COMM INDIV: CPT

## 2022-01-13 PROCEDURE — 83735 ASSAY OF MAGNESIUM: CPT | Performed by: STUDENT IN AN ORGANIZED HEALTH CARE EDUCATION/TRAINING PROGRAM

## 2022-01-13 PROCEDURE — 94640 AIRWAY INHALATION TREATMENT: CPT

## 2022-01-13 PROCEDURE — U0002 COVID-19 LAB TEST NON-CDC: HCPCS | Performed by: STUDENT IN AN ORGANIZED HEALTH CARE EDUCATION/TRAINING PROGRAM

## 2022-01-13 PROCEDURE — 85025 COMPLETE CBC W/AUTO DIFF WBC: CPT | Performed by: STUDENT IN AN ORGANIZED HEALTH CARE EDUCATION/TRAINING PROGRAM

## 2022-01-13 PROCEDURE — 99900035 HC TECH TIME PER 15 MIN (STAT)

## 2022-01-13 PROCEDURE — 94761 N-INVAS EAR/PLS OXIMETRY MLT: CPT

## 2022-01-13 PROCEDURE — 99900026 HC AIRWAY MAINTENANCE (STAT)

## 2022-01-13 PROCEDURE — 97535 SELF CARE MNGMENT TRAINING: CPT

## 2022-01-13 PROCEDURE — 25000242 PHARM REV CODE 250 ALT 637 W/ HCPCS: Performed by: OTOLARYNGOLOGY

## 2022-01-13 PROCEDURE — 25000242 PHARM REV CODE 250 ALT 637 W/ HCPCS

## 2022-01-13 PROCEDURE — 84100 ASSAY OF PHOSPHORUS: CPT | Performed by: STUDENT IN AN ORGANIZED HEALTH CARE EDUCATION/TRAINING PROGRAM

## 2022-01-13 PROCEDURE — 63600175 PHARM REV CODE 636 W HCPCS: Performed by: STUDENT IN AN ORGANIZED HEALTH CARE EDUCATION/TRAINING PROGRAM

## 2022-01-13 PROCEDURE — 97116 GAIT TRAINING THERAPY: CPT

## 2022-01-13 PROCEDURE — 94760 N-INVAS EAR/PLS OXIMETRY 1: CPT

## 2022-01-13 PROCEDURE — 99233 SBSQ HOSP IP/OBS HIGH 50: CPT | Mod: ,,, | Performed by: STUDENT IN AN ORGANIZED HEALTH CARE EDUCATION/TRAINING PROGRAM

## 2022-01-13 PROCEDURE — 25000242 PHARM REV CODE 250 ALT 637 W/ HCPCS: Performed by: STUDENT IN AN ORGANIZED HEALTH CARE EDUCATION/TRAINING PROGRAM

## 2022-01-13 RX ORDER — SODIUM CHLORIDE FOR INHALATION 3 %
4 VIAL, NEBULIZER (ML) INHALATION EVERY 6 HOURS PRN
Status: DISCONTINUED | OUTPATIENT
Start: 2022-01-13 | End: 2022-01-13

## 2022-01-13 RX ORDER — AMOXICILLIN 250 MG
1 CAPSULE ORAL 2 TIMES DAILY
Status: DISCONTINUED | OUTPATIENT
Start: 2022-01-13 | End: 2022-01-25 | Stop reason: HOSPADM

## 2022-01-13 RX ORDER — SODIUM CHLORIDE FOR INHALATION 3 %
4 VIAL, NEBULIZER (ML) INHALATION EVERY 8 HOURS
Status: DISCONTINUED | OUTPATIENT
Start: 2022-01-13 | End: 2022-01-25 | Stop reason: HOSPADM

## 2022-01-13 RX ORDER — SYRING-NEEDL,DISP,INSUL,0.3 ML 29 G X1/2"
296 SYRINGE, EMPTY DISPOSABLE MISCELLANEOUS ONCE
Status: DISCONTINUED | OUTPATIENT
Start: 2022-01-13 | End: 2022-01-25 | Stop reason: HOSPADM

## 2022-01-13 RX ORDER — AMLODIPINE BESYLATE 5 MG/1
5 TABLET ORAL DAILY
Status: CANCELLED | OUTPATIENT
Start: 2022-01-13

## 2022-01-13 RX ADMIN — FOLIC ACID 1 MG: 1 TABLET ORAL at 09:01

## 2022-01-13 RX ADMIN — POLYETHYLENE GLYCOL 3350 17 G: 17 POWDER, FOR SOLUTION ORAL at 09:01

## 2022-01-13 RX ADMIN — BACITRACIN 1 EACH: 500 OINTMENT TOPICAL at 08:01

## 2022-01-13 RX ADMIN — SODIUM CHLORIDE 30 MG/ML INHALATION SOLUTION 4 ML: 30 SOLUTION INHALANT at 10:01

## 2022-01-13 RX ADMIN — OXYCODONE HYDROCHLORIDE 10 MG: 5 SOLUTION ORAL at 02:01

## 2022-01-13 RX ADMIN — MUPIROCIN: 20 OINTMENT TOPICAL at 09:01

## 2022-01-13 RX ADMIN — ENOXAPARIN SODIUM 40 MG: 40 INJECTION SUBCUTANEOUS at 04:01

## 2022-01-13 RX ADMIN — ACETAMINOPHEN 650 MG: 325 TABLET ORAL at 12:01

## 2022-01-13 RX ADMIN — SENNOSIDES AND DOCUSATE SODIUM 1 TABLET: 50; 8.6 TABLET ORAL at 09:01

## 2022-01-13 RX ADMIN — LOSARTAN POTASSIUM 50 MG: 50 TABLET, FILM COATED ORAL at 09:01

## 2022-01-13 RX ADMIN — MUPIROCIN: 20 OINTMENT TOPICAL at 08:01

## 2022-01-13 RX ADMIN — ASPIRIN 81 MG CHEWABLE TABLET 81 MG: 81 TABLET CHEWABLE at 09:01

## 2022-01-13 RX ADMIN — THIAMINE HCL TAB 100 MG 100 MG: 100 TAB at 09:01

## 2022-01-13 RX ADMIN — ACETAMINOPHEN 650 MG: 325 TABLET ORAL at 06:01

## 2022-01-13 RX ADMIN — IPRATROPIUM BROMIDE AND ALBUTEROL SULFATE 3 ML: 2.5; .5 SOLUTION RESPIRATORY (INHALATION) at 08:01

## 2022-01-13 RX ADMIN — CLOPIDOGREL 75 MG: 75 TABLET, FILM COATED ORAL at 09:01

## 2022-01-13 RX ADMIN — OXYCODONE HYDROCHLORIDE 10 MG: 5 SOLUTION ORAL at 09:01

## 2022-01-13 RX ADMIN — SODIUM CHLORIDE 30 MG/ML INHALATION SOLUTION 4 ML: 30 SOLUTION INHALANT at 02:01

## 2022-01-13 RX ADMIN — OXYCODONE HYDROCHLORIDE 10 MG: 5 SOLUTION ORAL at 04:01

## 2022-01-13 RX ADMIN — IPRATROPIUM BROMIDE AND ALBUTEROL SULFATE 3 ML: 2.5; .5 SOLUTION RESPIRATORY (INHALATION) at 02:01

## 2022-01-13 RX ADMIN — ACETYLCYSTEINE 4 ML: 100 INHALANT RESPIRATORY (INHALATION) at 08:01

## 2022-01-13 RX ADMIN — ATORVASTATIN CALCIUM 20 MG: 20 TABLET, FILM COATED ORAL at 09:01

## 2022-01-13 RX ADMIN — BACITRACIN 1 EACH: 500 OINTMENT TOPICAL at 09:01

## 2022-01-13 RX ADMIN — OXYCODONE HYDROCHLORIDE 10 MG: 5 SOLUTION ORAL at 12:01

## 2022-01-13 RX ADMIN — OXYCODONE HYDROCHLORIDE 10 MG: 5 SOLUTION ORAL at 08:01

## 2022-01-13 RX ADMIN — POLYETHYLENE GLYCOL 3350 17 G: 17 POWDER, FOR SOLUTION ORAL at 08:01

## 2022-01-13 NOTE — ASSESSMENT & PLAN NOTE
71M with [id number] SCC of the oral tongue s/p total glossectomy, bilateral neck dissections levels I-IV, tracheostomy, and ALTFF reconstruction with G tube placement by general surgery. Doing well.     -Continue q4h flap checks  -Plavix/ASA  -continue TFs, will transition to bolus today   -pain control (scheduled tylenol, oxycodone PRN)  -trach teaching with respiratory   -PT/OT, OOB    Ppx: L40, SCDs, NIKITA hose     Dispo: step down to PCU today, possible discharge to rehab early next week

## 2022-01-13 NOTE — PROGRESS NOTES
[facility]ensive [facility]  Critical Care - Surgery  Progress Note    Patient Name: Fareed Richard Jr.  MRN: [id number]  Admission Date: 1/3/2022  Hospital Length of Stay: 10 days  Code Status: Full Code  Attending Provider: Naeem Smalls MD  Primary Care Provider: Kodi Tubbs MD   Principal Problem: Squamous cell cancer of tongue    Subjective:     Hospital/ICU Course:  Fareed Richard Jr. is a 71 y.o. M with hx of squamous cell carcinoma, HTN, CAD s/p stents, carotid stenosis s/p carotid endarterectomy in 2018, PAD s/p b/l femoral atherectomies and other LE interventions s/p total glossectomy, tracheostomy and free flap reconstruction with ENT on 1/4.  The patient has been doing well since surgery, requiring limited pain control.  He was stepped down to the floor but stepped back after a fall out of bed.       Interval History/Significant Events:   No acute events overnight  Episode of hypotension overnight in the 50s systolic requiring 500cc LR bolus, responsive.     Follow-up For: Procedure(s) (LRB):  GLOSSECTOMY (N/A)  TRACHEOTOMY (N/A)  EGD, WITH PEG TUBE INSERTION (N/A)  CREATION, FREE FLAP (Right)  DISSECTION, NECK (Bilateral)    Post-Operative Day: 9 Days Post-Op    Objective:     Vital Signs (Most Recent):  Temp: 99 °F (37.2 °C) (01/13/22 0300)  Pulse: 88 (01/13/22 0400)  Resp: (!) 29 (01/13/22 0400)  BP: (!) 91/55 (01/13/22 0345)  SpO2: (!) 93 % (01/13/22 0400) Vital Signs (24h Range):  Temp:  [97.4 °F (36.3 °C)-99 °F (37.2 °C)] 99 °F (37.2 °C)  Pulse:  [] 88  Resp:  [11-37] 29  SpO2:  [84 %-100 %] 93 %  BP: ()/(44-74) 91/55     Weight: 66.9 kg (147 lb 7.8 oz)  Body mass index is 22.43 kg/m².      Intake/Output Summary (Last 24 hours) at 1/13/2022 0541  Last data filed at 1/12/2022 2301  Gross per 24 hour   Intake 1250 ml   Output 961 ml   Net 289 ml       Physical Exam  Constitutional:       General: He is not in acute distress.     Appearance: He is not ill-appearing,  toxic-appearing or diaphoretic.   HENT:      Head:      Comments: Cervical facial flap soft, appropriate coloring, + triphasic artery  Pulses, CDI     Right Ear: External ear normal.      Left Ear: External ear normal.      Nose: No congestion.      Mouth/Throat:      Mouth: Mucous membranes are moist.   Eyes:      Extraocular Movements: Extraocular movements intact.      Pupils: Pupils are equal, round, and reactive to light.   Cardiovascular:      Rate and Rhythm: Normal rate and regular rhythm.      Pulses: Normal pulses.      Heart sounds: Normal heart sounds. No murmur heard.      Pulmonary:      Effort: Pulmonary effort is normal. No respiratory distress.      Breath sounds: Normal breath sounds. No wheezing.      Comments: Trach collar  6-0 trach  Abdominal:      General: Abdomen is flat. There is no distension.      Palpations: Abdomen is soft.      Tenderness: There is no abdominal tenderness. There is no guarding.      Comments: G tube in place   Musculoskeletal:         General: No swelling.      Cervical back: Normal range of motion and neck supple. Tenderness present. No rigidity.      Comments: Donor Site: R thigh incision CDI, no signs of hematoma. R DP pulses intact. Drains with minimal SS output   Skin:     General: Skin is warm and dry.      Comments: Abrasion left hand, left forearm, left arm. Well bandaged.   Neurological:      General: No focal deficit present.      Mental Status: He is alert and oriented to person, place, and time.   Psychiatric:         Judgment: Judgment normal.         Vents:  Oxygen Concentration (%): 35 (01/13/22 0300)    Lines/Drains/Airways     Drain                 Gastrostomy/Enterostomy 01/04/22 Percutaneous endoscopic gastrostomy (PEG) LUQ 9 days         Closed/Suction Drain 01/04/22 1244 Right;Anterior Thigh Bulb 15 Fr. 8 days         Closed/Suction Drain 01/04/22 1246 Right;Anterior Thigh Bulb 15 Fr. 8 days         Closed/Suction Drain 01/04/22 1450 Left Neck Bulb  15 Fr. 8 days         Closed/Suction Drain 01/04/22 1450 Right Neck Bulb 15 Fr. 8 days         Closed/Suction Drain 01/04/22 1450 Right Neck Bulb 15 Fr. 8 days          Airway                 Surgical Airway 01/04/22 1546 Shiley Cuffed 8 days          Peripheral Intravenous Line                 Peripheral IV - Single Lumen 01/08/22 0416 22 G Anterior;Distal;Left Upper Arm 5 days         Peripheral IV - Single Lumen 01/09/22 1630 18 G Anterior;Distal;Left Forearm 3 days                Significant Labs:    CBC/Anemia Profile:  Recent Labs   Lab 01/12/22  0510 01/13/22  0227   WBC 9.87 7.37   HGB 8.2* 8.0*   HCT 26.0* 26.8*    320   MCV 96 100*   RDW 14.7* 14.8*        Chemistries:  Recent Labs   Lab 01/12/22  0510 01/13/22  0227   * 137   K 3.8 4.0   CL 99 103   CO2 29 22*   BUN 21 20   CREATININE 0.6 0.5   CALCIUM 9.2 8.7   MG 2.0 2.1   PHOS 4.5 4.7*       All pertinent labs within the past 24 hours have been reviewed.    Significant Imaging:  I have reviewed all pertinent imaging results/findings within the past 24 hours.    Assessment/Plan:     * Squamous cell cancer of tongue  Fareed Richard Jr. is a 71 y.o. M with hx of squamous cell carcinoma, HTN, CAD s/p stents, carotid stenosis s/p carotid endarterectomy, PAD s/p b/l femoral atherectomies and other LE interventions who was admitted to SICU and will undergo glossectomy and face/neck reconstruction with ENT on 1/4/21.    Neuro/Psych:  - No sedation  - Pain controlled: scheduled tylenol, oxycodone/dilaudid prn     #alcohol use  - patient reports drinking 3-4 beers per night, last drink was 1/1/22  - Thiamine/Folate  - Out of window for withdrawal, delirium tremens    CV  #CAD s/p stents  #Carotid stenosis s/p endarterectomy  #PVS s/p multiple LE interventions including b/l femoral arthetectomies  - continue atorv, ASA and restarted plavix 1/11/22    #HTN  - Home meds: continue Amlodipine 10, Metoprolol- BID, Losartan 50. Holding hydralazine  -  Metoprolol 200 BID since 1/9/22, HTN better controlled  - stop amlodipine today given hypotension ON     Pulm  #COPD  #Former smoker  - venotlin inhaler  - duonebs prn    #Tracheostomy 1/4  - Trach replaced 1/12/22  - on trach collar 8L / 35%humidified O2, wean as tolerated  - mucomyst for increased secretions    GI  - Nutrition consult  -F: mIVFs off, stopped 250 FW flushes  -E: replete prn  -N: TF 50 ml/hr, goal  - Continue bowel regimen    Renal  No active issues  - BUN/Cr 20/0.5  - UOp adequate, 900cc last 24 hr    Heme/Onc  #Squamous Cell Carcinoma of tongue   #s/p total glossectomy, tracheostomy and free flap reconstruction  - q4h flap checks, arterial doppler without issue    ID  - Finished Unasyn course  - Stable WBC    Endo  - no active issues         PPx:   Feeding: NPO, tube feeds at goal.  Analgesia/Sedation: well controlled: tylenol  Thromboembolic prevention: lovenox  HOB >30: yes  Stress Ulcer ppx: no   Glucose control: Critical care goal 140-180 g/dl, ISS    Lines/Drains/Airway: trach, PIV, RACHEL x 5:  2 right neck, 1 left neck and 2 right leg, PEG      Dispo/Code Status/Palliative:   -- No Critical Care Needs at this Time, appropriate for step down / Full Code      Critical care was time spent personally by me on the following activities: development of treatment plan with patient or surrogate and bedside caregivers, discussions with consultants, evaluation of patient's response to treatment, examination of patient, ordering and performing treatments and interventions, ordering and review of laboratory studies, ordering and review of radiographic studies, pulse oximetry, re-evaluation of patient's condition.  This critical care time did not overlap with that of any other provider or involve time for any procedures.     Victor Manuel Retana MD  Critical Care - Surgery  [facility]

## 2022-01-13 NOTE — PLAN OF CARE
"      SICU PLAN OF CARE NOTE    Dx: Squamous cell cancer of tongue    Shift Events: 500 cc LR bolus for low BP    Goals of Care: MAP >65, flap check q4h    Neuro: AAO x4, Follows Commands and Moves All Extremities    Vital Signs: /67   Pulse 85   Temp 98.3 °F (36.8 °C) (Axillary)   Resp 11   Ht 5' 8" (1.727 m)   Wt 66.9 kg (147 lb 7.8 oz)   SpO2 (!) 92%   BMI 22.43 kg/m²     Respiratory: Trach Collar 8L/35%    Diet: Tube Feeds    Urine Output: Voids Spontaneously 700 cc/shift    Drains: RACHEL Drain, total output 5-15 cc / 4 hours    Flap Checks q4h; pulses dopplered. Site w/ sutures intact, warm and pink     Labs/Accuchecks: all labs trended and reviewed    Skin: all skin remains free from further injury. Foams changed, RACHEL drain sites healing. PEG tube site cleaned and gauze changed as needed. Weight assistance provided, turned as needed. ICU bed plugged in and working properly. Transfer orders in place.      "

## 2022-01-13 NOTE — PT/OT/SLP PROGRESS
Physical Therapy Treatment    Patient Name:  Fareed Richard Jr.   MRN:  [id number]  Admit Date: 1/3/2022  Admitting Diagnosis:  Squamous cell cancer of tongue   Length of Stay: 10 days  Recent Surgery: Procedure(s) (LRB):  GLOSSECTOMY (N/A)  TRACHEOTOMY (N/A)  EGD, WITH PEG TUBE INSERTION (N/A)  CREATION, FREE FLAP (Right)  DISSECTION, NECK (Bilateral) 9 Days Post-Op    Recommendations:     Discharge Recommendations:  rehabilitation facility   Discharge Equipment Recommendations: none   Barriers to discharge: None    Plan:     During this hospitalization, patient to be seen 4 x/week to address the listed problems via gait training,therapeutic activities,therapeutic exercises,neuromuscular re-education  · Plan of Care Expires:  02/11/22   Plan of Care Reviewed with: patient    Assessment:     Fareed Richard Jr. is a 71 y.o. male admitted with a medical diagnosis of Squamous cell cancer of tongue. Pt progressing towards goals, but not at PLOF. Pt tolerated session well but continues with generalized weakness, impaired endurance, and SOB. Pt is improving with therapy evidenced by increased gait distance and increased activity tolerance. Pt would benefit from continued PT services to improve overall functional mobility. Pt is an excellent IP rehab candidate due to good activity tolerance, decline from PLOF, good family support, and excellent motivation. Recommend d/c to Rehab to maximize functional independence.          Problem List: weakness,impaired endurance,impaired functional mobilty,gait instability,impaired balance,pain,impaired cardiopulmonary response to activity.  Rehab Prognosis: Good     GOALS:   Multidisciplinary Problems     Physical Therapy Goals        Problem: Physical Therapy Goal    Goal Priority Disciplines Outcome Goal Variances Interventions   Physical Therapy Goal     PT, PT/OT Ongoing, Progressing     Description: Goals to be met by: 1/25/2022    Patient will increase functional independence with  "mobility by performin. Supine to sit with Supervision -not met  2. Sit to stand transfer with Supervision using RW -not met  3. Gait  x 150 feet with Supervision using Rolling Walker. -not met  4. Ascend/descend 3 stair with right Handrails CGA -not met  5. Stand for 3 minutes with Supervision using RW -not met                       Subjective   Communicated with RN prior to session.  Patient found HOB elevated upon PT entry to room, agreeable to evaluation. Fareed Richard Jr.'s alone during session.    Chief Complaint: SOB and malaise   Patient/Family Comments/goals: to get better and return home   Pain/Comfort:  · Pain Rating 1: 0/10  · Pain Rating Post-Intervention 1: 0/10    Objective:   Patient found with: telemetry,pulse ox (continuous),blood pressure cuff,peripheral IV,PEG Tube,RACHEL drain,oxygen,Tracheostomy   General Precautions: Standard, Cardiac fall   Orthopedic Precautions:N/A   Braces: N/A   Oxygen Device: Trach Collar   Vitals: BP (!) 85/54   Pulse 91   Temp 99 °F (37.2 °C) (Axillary)   Resp 16   Ht 5' 8" (1.727 m)   Wt 66.9 kg (147 lb 7.8 oz)   SpO2 (!) 93%   BMI 22.43 kg/m²     Outcome Measures:  AM-PAC 6 CLICK MOBILITY  Turning over in bed (including adjusting bedclothes, sheets and blankets)?: 3  Sitting down on and standing up from a chair with arms (e.g., wheelchair, bedside commode, etc.): 3  Moving from lying on back to sitting on the side of the bed?: 3  Moving to and from a bed to a chair (including a wheelchair)?: 3  Need to walk in hospital room?: 3  Climbing 3-5 steps with a railing?: 1  Basic Mobility Total Score: 16       Functional Mobility:  Additional staff present: OT - due to pt requiring 2 skilled therapists to safely perform functional mobility, monitor vital signs, and to accommodate pt's activity tolerance  Bed Mobility:    Supine to Sit: stand by assistance; HOB elevated   o Pt continues to required increased time but was able to perform the transfer faster this " session   Scooting anteriorly to EOB to have both feet planted on floor: stand by assistance    Sitting Balance at Edge of Bed:   Assistance Level Required: Stand-by Assistance   Comments:   o Pt reported slight dizziness while sitting EOB - VSS. Increased time spent on deep breathing and keeping eyes open   o Worked on activity tolerance sitting EOB and worked on maintaining tolerance to positional change   o Pt attempt to don sock while sitting EOB but was unsucessful    Transfers:    Sit <> Stand Transfer: contact guard assistance with rolling walker x 2 trials (EOB, chair)      Gait:  · Patient ambulated: 10ft + 25ft (seated rest break between trials) within room  · Pt also required brief occasional standing rest breaks to perform deep breathing  · Cuing provided to keep eyes open  · Patient required: contact guard  · Patient used: rolling walker  · Gait Pattern observed: reciprocal gait  · Gait Deviation(s): occasional unsteady gait, decreased step length, flexed posture and decreased tammi  · Impairments due to: impaired balance, decreased strength and decreased endurance  · Comments:   · Verbal cuing for pacing, deep breathing, and RW management  · Verbal cuing for upright posture and direction  · Worked on increasing cardiovascular and muscular endurance and increasing B LE strength         Therapeutic Activities, Exercises, and Education:   Educated pt on PT role/POC  Educated pt on importance of OOB activity and daily ambulation   Educated pt on RW management   Pt verbalized understanding     Pt performed ADLs standing at sink with OT to work on standing tolerance and dynamic standing balance     T/f to chair to increase tolerance to OOB activity and to create optimal positioning for lung expansion     Patient left up in chair with all lines intact, call button in reach and RN notified..    Time Tracking:     PT Received On: 01/13/22  PT Start Time: 0848     PT Stop Time: 0927  PT Total Time (min):  39 min       Billable Minutes:   · Gait Training 23 and Therapeutic Activity 15    Treatment Type: Treatment  PT/PTA: PT

## 2022-01-13 NOTE — PHYSICIAN QUERY
PT Name: Fareed Richard Jr.  MR #: [id number]    DOCUMENTATION CLARIFICATION     CDS/: Neyda MAIN, RN              Contact information: [email or web address]     This form is a permanent document in the medical record.     Query Date: January 13, 2022    By submitting this query, we are merely seeking further clarification of documentation.. Please utilize your independent clinical judgment when addressing the question(s) below.    The medical record contains the following:   Indicators  Supporting Clinical Findings Location in Medical Record   x % of Estimated Energy Intake over a time frame from p.o., TF, or TPN  Energy Intake: </= 75% of estimated energy   Nutrition Progress 1/12/22   x Weight Status over a time frame  Weight Loss: 14% x 1 month  Nutrition Progress 1/12/22   x Subcutaneous Fat and/or Muscle Loss  Body Fat Depletion: moderate depletion of orbitals and triceps   Muscle Mass Depletion: moderate and severe depletion of temples, clavicle region, scapular region, interosseous muscle and lower extremities   Nutrition Progress 1/12/22   x Fluid Accumulation or Edema  Fluid Accumulation: mild  Nutrition Progress 1/12/22    Wt/BMI/Usual Body Weight      Delayed Wound Healing/Failure to Thrive     x Acute or Chronic Illness  Nutrition Problem:  Severe Protein-Calorie Malnutrition  Malnutrition in the context of Chronic Illness/Injury  Nutrition Progress 1/12/22    Medication     x Treatment  PEG on 1/4. Tolerating TFs.   Nutrition Progress 1/12/22    Other       AND / ASPEN Clinical Characteristics (October 2011)  A minimum of two characteristics is recommended for diagnosing either moderate or severe malnutrition   Mild Malnutrition Moderate Malnutrition Severe Malnutrition   Energy Intake from p.o., TF or TPN. < 75% intake of estimated energy needs for less than 7 days < 75% intake of estimated energy needs for greater than 7 days < 50% intake of estimated energy needs for > 5 days   Weight Loss  1-2% in 1 month  5% in 3 months  7.5% in 6 months  10% in 1 year 1-2 % in 1 week  5% in 1 month  7.5% in 3 months  10% in 6 months  20% in 1 year > 2% in 1 week  > 5% in 1 month  > 7.5% in 3 months  > 10% in 6 months  > 20% in 1 year   Physical Findings     None *Mild subcutaneous fat and/or muscle loss  *Mild fluid accumulation  *Stage II decubitus  *Surgical wound or non-healing wound *Mod/severe subcutaneous fat and/or muscle loss  *Mod/severe fluid accumulation  *Stage III or IV decubitus  *Non-healing surgical wound     Provider, please specify diagnosis associated with above clinical findings. Thank you    [ x ]  Severe Protein-Calorie Malnutrition     [  ]  Other Degree of Malnutrition (please specify): _____________     [  ]  Other Nutritional Diagnosis (please specify): _______     [  ]  Clinically Undetermined        Please document in your progress notes daily for the duration of treatment until resolved and include in your discharge summary.

## 2022-01-13 NOTE — SUBJECTIVE & OBJECTIVE
Interval History/Significant Events:   No acute events overnight  Episode of hypotension overnight in the 50s systolic requiring 500cc LR bolus, responsive.     Follow-up For: Procedure(s) (LRB):  GLOSSECTOMY (N/A)  TRACHEOTOMY (N/A)  EGD, WITH PEG TUBE INSERTION (N/A)  CREATION, FREE FLAP (Right)  DISSECTION, NECK (Bilateral)    Post-Operative Day: 9 Days Post-Op    Objective:     Vital Signs (Most Recent):  Temp: 99 °F (37.2 °C) (01/13/22 0300)  Pulse: 88 (01/13/22 0400)  Resp: (!) 29 (01/13/22 0400)  BP: (!) 91/55 (01/13/22 0345)  SpO2: (!) 93 % (01/13/22 0400) Vital Signs (24h Range):  Temp:  [97.4 °F (36.3 °C)-99 °F (37.2 °C)] 99 °F (37.2 °C)  Pulse:  [] 88  Resp:  [11-37] 29  SpO2:  [84 %-100 %] 93 %  BP: ()/(44-74) 91/55     Weight: 66.9 kg (147 lb 7.8 oz)  Body mass index is 22.43 kg/m².      Intake/Output Summary (Last 24 hours) at 1/13/2022 0541  Last data filed at 1/12/2022 2301  Gross per 24 hour   Intake 1250 ml   Output 961 ml   Net 289 ml       Physical Exam  Constitutional:       General: He is not in acute distress.     Appearance: He is not ill-appearing, toxic-appearing or diaphoretic.   HENT:      Head:      Comments: Cervical facial flap soft, appropriate coloring, + triphasic artery  Pulses, CDI     Right Ear: External ear normal.      Left Ear: External ear normal.      Nose: No congestion.      Mouth/Throat:      Mouth: Mucous membranes are moist.   Eyes:      Extraocular Movements: Extraocular movements intact.      Pupils: Pupils are equal, round, and reactive to light.   Cardiovascular:      Rate and Rhythm: Normal rate and regular rhythm.      Pulses: Normal pulses.      Heart sounds: Normal heart sounds. No murmur heard.      Pulmonary:      Effort: Pulmonary effort is normal. No respiratory distress.      Breath sounds: Normal breath sounds. No wheezing.      Comments: Trach collar  6-0 trach  Abdominal:      General: Abdomen is flat. There is no distension.      Palpations:  Abdomen is soft.      Tenderness: There is no abdominal tenderness. There is no guarding.      Comments: G tube in place   Musculoskeletal:         General: No swelling.      Cervical back: Normal range of motion and neck supple. Tenderness present. No rigidity.      Comments: Donor Site: R thigh incision CDI, no signs of hematoma. R DP pulses intact. Drains with minimal SS output   Skin:     General: Skin is warm and dry.      Comments: Abrasion left hand, left forearm, left arm. Well bandaged.   Neurological:      General: No focal deficit present.      Mental Status: He is alert and oriented to person, place, and time.   Psychiatric:         Judgment: Judgment normal.         Vents:  Oxygen Concentration (%): 35 (01/13/22 0300)    Lines/Drains/Airways     Drain                 Gastrostomy/Enterostomy 01/04/22 Percutaneous endoscopic gastrostomy (PEG) LUQ 9 days         Closed/Suction Drain 01/04/22 1244 Right;Anterior Thigh Bulb 15 Fr. 8 days         Closed/Suction Drain 01/04/22 1246 Right;Anterior Thigh Bulb 15 Fr. 8 days         Closed/Suction Drain 01/04/22 1450 Left Neck Bulb 15 Fr. 8 days         Closed/Suction Drain 01/04/22 1450 Right Neck Bulb 15 Fr. 8 days         Closed/Suction Drain 01/04/22 1450 Right Neck Bulb 15 Fr. 8 days          Airway                 Surgical Airway 01/04/22 1546 Shiley Cuffed 8 days          Peripheral Intravenous Line                 Peripheral IV - Single Lumen 01/08/22 0416 22 G Anterior;Distal;Left Upper Arm 5 days         Peripheral IV - Single Lumen 01/09/22 1630 18 G Anterior;Distal;Left Forearm 3 days                Significant Labs:    CBC/Anemia Profile:  Recent Labs   Lab 01/12/22  0510 01/13/22 0227   WBC 9.87 7.37   HGB 8.2* 8.0*   HCT 26.0* 26.8*    320   MCV 96 100*   RDW 14.7* 14.8*        Chemistries:  Recent Labs   Lab 01/12/22 0510 01/13/22 0227   * 137   K 3.8 4.0   CL 99 103   CO2 29 22*   BUN 21 20   CREATININE 0.6 0.5   CALCIUM 9.2 8.7    MG 2.0 2.1   PHOS 4.5 4.7*       All pertinent labs within the past 24 hours have been reviewed.    Significant Imaging:  I have reviewed all pertinent imaging results/findings within the past 24 hours.

## 2022-01-13 NOTE — PLAN OF CARE
[facility] - [facility] Intensive Care  Discharge Reassessment    Primary Care Provider: Kodi Tubbs MD    Expected Discharge Date: 1/19/2022    Reassessment (most recent)     Discharge Reassessment - 01/13/22 1205        Discharge Reassessment    Assessment Type Discharge Planning Reassessment     Communicated NISA with patient/caregiver Date not available/Unable to determine     Discharge Plan A Rehab     Discharge Plan B Rehab     DME Needed Upon Discharge  other (see comments)   TBD    Discharge Barriers Identified None     Why the patient remains in the hospital Requires continued medical care        Post-Acute Status    Post-Acute Authorization Placement     Post-Acute Placement Status Referrals Sent               Per MD's Note, No acute events overnight  Episode of hypotension overnight in the 50s systolic requiring 500cc LR bolus, responsive.    The patient remains in SICU and is not medically stable to discharge at this time. The patient is pending acceptance with [facility]. The SW will continue to follow-up with the patient to assist with discharge planning.       12:09 PM  The SW contacted Shira with [facility] to follow-up regarding the patient's discharge placement. Shira informed the SW that she was still waiting on a decision from her RN. The SW provided Shira with her contact information and awaiting a call back in regards to the patient's discharge placement.     The SW will continue to follow to assist with discharge planning .       Jordan Aceves LMSW  Case Management [facility]  Ext: [phone]\

## 2022-01-13 NOTE — PT/OT/SLP PROGRESS
Occupational Therapy   Co-Treatment    Name: Fareed Richard Jr.  MRN: [id number]  Admitting Diagnosis:  Squamous cell cancer of tongue      9 Days Post-Op  Pre-op Diagnosis: Tongue cancer [C02.9]    Procedure(s):  GLOSSECTOMY  TRACHEOTOMY  EGD, WITH PEG TUBE INSERTION  CREATION, FREE FLAP  DISSECTION, NECK     Recommendations:     Discharge Recommendations: rehabilitation facility  Discharge Equipment Recommendations: none  Barriers to discharge: requiring increased assist with ADLs and mobility    Assessment:     Fareed Richard Jr. is a 71 y.o. male with a medical diagnosis of Squamous cell cancer of tongue. He presents with the following performance deficits affecting function: weakness,impaired endurance,impaired self care skills,impaired functional mobilty,gait instability,impaired balance,impaired cardiopulmonary response to activity,pain,decreased upper extremity function. Patient agreeable to therapy session and OOB activity. Patient presents with generalized weakness and decreased activity tolerance requiring rest periods throughout session. At this time, patient will continue to benefit from acute skilled therapy intervention to address deficits/underlying impairments and progress towards prior level of function. After discharge, patient will benefit from continuing therapy at rehab facility to increase independence in ADLs and functional mobility through skilled balance training and skilled therapeutic exercise to promote safety at home.    Rehab Prognosis:  Good; patient would benefit from acute skilled OT services to address these deficits and reach maximum level of function.       Plan:     Patient to be seen 4 x/week to address the above listed problems via self-care/home management,therapeutic activities,therapeutic exercises  · Plan of Care Expires: 02/12/22  · Plan of Care Reviewed with: patient    Subjective     Pain/Comfort:  · Pain Rating 1: 0/10 (at rest; L shoulder pain with  movement)    Objective:     Communicated with: RN prior to session.  Patient found HOB elevated with oxygen,telemetry,RACHEL drain,peripheral IV,PEG Tube,Tracheostomy,blood pressure cuff upon OT entry to room.    General Precautions: Standard, fall,aspiration,NPO   Orthopedic Precautions:N/A   Braces: N/A  Respiratory Status: supplemental O2 through trach collar      Occupational Performance:     Bed Mobility:    · Patient completed Supine to Sit with SBA with HOB elevated  · Patient completed Anterior Scooting to place B feet on floor with SBA    Functional Mobility/Transfers:  · Patient completed Sit <> Stand Transfer with contact guard assistance  with  rolling walker    · 1x from EOB  · 1x from bedside chair  · Cues provided for correct hand placement  · Functional Mobility: Patient ambulated within room with CGA with RW  · Cues provided for upright posture and forward gaze    Balance:   Sitting: SBA   Standing: CGA-min assist    Activities of Daily Living:  · Grooming: CGA to min assist standing at sink with RW to complete washing face and combing hair  · Lower Body Dressing: total assistance to don socks sitting EOB    AMPAC 6 Click ADL: 13    Treatment & Education:   Therapist provided facilitation and instruction of proper body mechanics and fall prevention strategies during tasks listed above.   Instructed patient to sit in bedside chair daily to increase OOB/activity tolerance.   Instructed patient to use call light to have nursing staff assist with needs/transfers.   Discussed OT POC and answered all questions within OT scope of practice.    Patient left up in chair with all lines intact and call button in reachEducation:      GOALS:   Multidisciplinary Problems     Occupational Therapy Goals        Problem: Occupational Therapy Goal    Goal Priority Disciplines Outcome Interventions   Occupational Therapy Goal     OT, PT/OT Ongoing, Progressing    Description: Goals to be met by: 7 days 1/18/22      Patient will increase functional independence with ADLs by performing:    Pt to complete standing g/h skills with SBA  Pt to complete UE dressing with DARRYL    Pt to complete LE dressing with MIN A   Pt to complete toileting with SBA  Pt to complete t/f to commode, bed and chair with SBA                     Time Tracking:     OT Date of Treatment: 01/13/22  OT Start Time: 0848  OT Stop Time: 0927  OT Total Time (min): 39 min    Billable Minutes:Self Care/Home Management 15  Therapeutic Activity 23    OT/NELLY: OT     NELLY Visit Number: 0    1/13/2022

## 2022-01-13 NOTE — PROGRESS NOTES
[facility] - Surgical Intensive Care  Otorhinolaryngology-Head & Neck Surgery  Progress Note    Subjective:     Post-Op Info:  Procedure(s) (LRB):  GLOSSECTOMY (N/A)  TRACHEOTOMY (N/A)  EGD, WITH PEG TUBE INSERTION (N/A)  CREATION, FREE FLAP (Right)  DISSECTION, NECK (Bilateral)   9 Days Post-Op  Hospital Day: 11     Interval History: NAEON. Failed MBSS yesterday.       Medications:  Continuous Infusions:   sodium chloride 0.9% Stopped (01/06/22 0636)     Scheduled Meds:   acetaminophen  650 mg Per G Tube Q6H    acetylcysteine 100 mg/ml (10%)  4 mL Nebulization TID WAKE    aspirin  81 mg Per G Tube Daily    atorvastatin  20 mg Per G Tube Daily    bacitracin zinc   Topical (Top) BID    clopidogreL  75 mg Per G Tube Daily    enoxaparin  40 mg Subcutaneous Daily    folic acid  1 mg Per G Tube Daily    losartan  50 mg Per G Tube Daily    magnesium citrate  296 mL Per G Tube Once    metoprolol tartrate  200 mg Per G Tube BID    mupirocin   Nasal BID    polyethylene glycol  17 g Per G Tube BID    senna-docusate 8.6-50 mg  1 tablet Per G Tube BID    thiamine  100 mg Per G Tube Daily     PRN Meds:albuterol-ipratropium, bisacodyL, hydrALAZINE, ondansetron, oxyCODONE, oxyCODONE, sodium chloride 0.9%     Review of patient's allergies indicates:  No Known Allergies  Objective:     Vital Signs (24h Range):  Temp:  [97.4 °F (36.3 °C)-99 °F (37.2 °C)] 99 °F (37.2 °C)  Pulse:  [] 90  Resp:  [11-37] 13  SpO2:  [72 %-99 %] 93 %  BP: ()/(49-74) 102/59     Date 01/13/22 0700 - 01/14/22 0659   Shift 4932-6870 5202-4648 7692-3323 24 Hour Total   INTAKE   NG/GT 50   50   Shift Total(mL/kg) 50(0.7)   50(0.7)   OUTPUT   Shift Total(mL/kg)       Weight (kg) 66.9 66.9 66.9 66.9     Lines/Drains/Airways     Drain                 Gastrostomy/Enterostomy 01/04/22 Percutaneous endoscopic gastrostomy (PEG) LUQ 9 days         Closed/Suction Drain 01/04/22 1244 Right;Anterior Thigh Bulb 15 Fr. 8 days         Closed/Suction  Drain 01/04/22 1246 Right;Anterior Thigh Bulb 15 Fr. 8 days         Closed/Suction Drain 01/04/22 1450 Left Neck Bulb 15 Fr. 8 days         Closed/Suction Drain 01/04/22 1450 Right Neck Bulb 15 Fr. 8 days         Closed/Suction Drain 01/04/22 1450 Right Neck Bulb 15 Fr. 8 days          Airway                 Surgical Airway 01/04/22 1546 Shiley Cuffed 8 days          Peripheral Intravenous Line                 Peripheral IV - Single Lumen 01/08/22 0416 22 G Anterior;Distal;Left Upper Arm 5 days         Peripheral IV - Single Lumen 01/09/22 1630 18 G Anterior;Distal;Left Forearm 3 days                Physical Exam  Awake and alert, NAD  NC/AT, EOMI, clear sclera  Normal external nose   MMM, rosemary-tongue ALTFF in place with good color that approximates the thigh, good tugar and warm, strong arterial doppler signal, and good venous signal from implantable doppler   Neck soft, advancement flaps in place without duskiness, staples intact, JPx2 on right with s/s output, RACHEL x 1 on left with s/s output  Right neck swelling decreasing and softer   6-0 cuffless trach in place and secured with sherry collar, aerating well  Normal work of breathing, on trach collar   G tube in place    Significant Labs:  CBC:   Recent Labs   Lab 01/13/22 0227   WBC 7.37   RBC 2.68*   HGB 8.0*   HCT 26.8*      *   MCH 29.9   MCHC 29.9*     CMP:   Recent Labs   Lab 01/13/22 0227   *   CALCIUM 8.7      K 4.0   CO2 22*      BUN 20   CREATININE 0.5       Significant Diagnostics:  I have reviewed and interpreted all pertinent imaging results/findings within the past 24 hours.       Assessment/Plan:     * Squamous cell cancer of tongue  71M with [id number] SCC of the oral tongue s/p total glossectomy, bilateral neck dissections levels I-IV, tracheostomy, and ALTFF reconstruction with G tube placement by general surgery. Doing well.     -Continue q4h flap checks  -Plavix/ASA  -continue TFs, will transition to bolus today    -pain control (scheduled tylenol, oxycodone PRN)  -trach teaching with respiratory   -PT/OT, OOB    Ppx: L40, SCDs, NIKITA hose     Dispo: step down to PCU today, possible discharge to rehab early next week             Annmarie Oliveira MD  Otorhinolaryngology-Head & Neck Surgery  [facility] - Surgical Intensive Care

## 2022-01-13 NOTE — ASSESSMENT & PLAN NOTE
Fareed Richard . is a 71 y.o. M with hx of squamous cell carcinoma, HTN, CAD s/p stents, carotid stenosis s/p carotid endarterectomy, PAD s/p b/l femoral atherectomies and other LE interventions who was admitted to SICU and will undergo glossectomy and face/neck reconstruction with ENT on 1/4/21.    Neuro/Psych:  - No sedation  - Pain controlled: scheduled tylenol, oxycodone/dilaudid prn     #alcohol use  - patient reports drinking 3-4 beers per night, last drink was 1/1/22  - Thiamine/Folate  - Out of window for withdrawal, delirium tremens    CV  #CAD s/p stents  #Carotid stenosis s/p endarterectomy  #PVS s/p multiple LE interventions including b/l femoral arthetectomies  - continue atorv, ASA and restarted plavix 1/11/22    #HTN  - Home meds: continue Amlodipine 10, Metoprolol- BID, Losartan 50. Holding hydralazine  - Metoprolol 200 BID since 1/9/22, HTN better controlled  - stop amlodipine today given hypotension ON     Pulm  #COPD  #Former smoker  - venotlin inhaler  - duonebs prn    #Tracheostomy 1/4  - Trach replaced 1/12/22  - on trach collar 8L / 35%humidified O2, wean as tolerated  - mucomyst for increased secretions    GI  - Nutrition consult  -F: mIVFs off, stopped 250 FW flushes  -E: replete prn  -N: TF 50 ml/hr, goal  - Continue bowel regimen    Renal  No active issues  - BUN/Cr 20/0.5  - UOp adequate, 900cc last 24 hr    Heme/Onc  #Squamous Cell Carcinoma of tongue   #s/p total glossectomy, tracheostomy and free flap reconstruction  - q4h flap checks, arterial doppler without issue    ID  - Finished Unasyn course  - Stable WBC    Endo  - no active issues         PPx:   Feeding: NPO, tube feeds at goal.  Analgesia/Sedation: well controlled: tylenol  Thromboembolic prevention: lovenox  HOB >30: yes  Stress Ulcer ppx: no   Glucose control: Critical care goal 140-180 g/dl, ISS    Lines/Drains/Airway: trach, PIV, RACHEL x 5:  2 right neck, 1 left neck and 2 right leg, PEG      Dispo/Code  Status/Palliative:   -- No Critical Care Needs at this Time, appropriate for step down / Full Code

## 2022-01-13 NOTE — PT/OT/SLP PROGRESS
Speech Language Pathology Treatment    Patient Name:  Fareed Richard Jr.   MRN:  [id number]  Admitting Diagnosis: Squamous cell cancer of tongue    Recommendations:                 General Recommendations:  One Way Speaking Valve  Diet recommendations:  NPO, Liquid Diet Level: NPO   Aspiration Precautions: Strict aspiration precautions   General Precautions: Standard, aspiration,fall,NPO  Communication strategies:  One way speaking valve    Subjective     Awake/alert  No family present   RT and RN present upon arrival     Pain/Comfort:   No PAIN PRE/POST     Respiratory Status:trach collar    Objective:     Has the patient been evaluated by SLP for swallowing?   Yes  Keep patient NPO? Yes     RT and RN at the bedside performing routine trach care. Per chart review team attempted to down size trach to a 4.0 cuffless previous day however pt unable to tolerate and 6.0 cuffless replaced. Pt with significantly thick yellow tinged tracheal secretions.  RT provided tracheal suction and extensive trach care prior to SLP speaking valve trials.       Passy Watson Speaking Valve  Trach size/type:Shiley 6 cuffless   Able to phonate around trach:yes, fair  O2 sats at rest:97%  O2 sats with PMSV in place: 95%  Vocal quality with valve in place :wet, adequate intensity. Pt unable to initiate swallow pt had to clear throat and clear secretions orally with suction  Back pressure upon removal: mild-moderate  Minutes PMSV worn: ~5-6 minutes  Education topics addressed: holding speaking valve trials until SLP able to re-assess    With valve in place Pt able to bring secretion to oropharynx and SLP able to extract via [place] however pt unable to maintain clear oral cavity.     Given severity of amount and thickness of tracheal secretions SLP discussed with Pt, RN and RT holding additional speaking valve attempts and to only continue trials within the presence of SLP or RT. Pt demonstrated understanding nodding in agreement. SLP also  reviewed with Pt results of MBS completed previous date.     SLP notified ENT NP of thickness, color and amount of pt tracheal secretions.       Assessment:     Fareed Richard Jr. is a 71 y.o. male with an SLP diagnosis of Dysphagia and One Way Speaking Valve Training.     Goals:   Multidisciplinary Problems     SLP Goals        Problem: SLP Goal    Goal Priority Disciplines Outcome   SLP Goal     SLP Ongoing, Progressing   Description: Speech Language Pathology Goals  Goals expected to be met by 1/12    1. Pt will tolerate PMSV for 5 minutes with >92% spO2 to increase phonation, respiration and swallowing aspects  2. Pt/family will don/doff PMSV x1 during session with min cues  3. Pt will vocalize with PMSV in place to increase verbal expression.                          Plan:     · Patient to be seen:  4 x/week   · Plan of Care expires:  02/01/22  · Plan of Care reviewed with:  patient,spouse   · SLP Follow-Up:  Yes       Discharge recommendations:  rehabilitation facility       Time Tracking:     SLP Treatment Date:   01/13/22  Speech Start Time:  0820  Speech Stop Time:  0833     Speech Total Time (min):  13 min    Billable Minutes: Speech Therapy Individual 5 and Self Care/Home Management Training 8    01/13/2022

## 2022-01-13 NOTE — PLAN OF CARE
Problem: SLP Goal  Goal: SLP Goal  Description: Speech Language Pathology Goals  Goals expected to be met by 1/12    1. Pt will tolerate PMSV for 5 minutes with >92% spO2 to increase phonation, respiration and swallowing aspects  2. Pt/family will don/doff PMSV x1 during session with min cues  3. Pt will vocalize with PMSV in place to increase verbal expression.         Outcome: Ongoing, Progressing     Pt slow progress towards goals

## 2022-01-13 NOTE — SUBJECTIVE & OBJECTIVE
Interval History: NAEON. Failed MBSS yesterday.       Medications:  Continuous Infusions:   sodium chloride 0.9% Stopped (01/06/22 0636)     Scheduled Meds:   acetaminophen  650 mg Per G Tube Q6H    acetylcysteine 100 mg/ml (10%)  4 mL Nebulization TID WAKE    aspirin  81 mg Per G Tube Daily    atorvastatin  20 mg Per G Tube Daily    bacitracin zinc   Topical (Top) BID    clopidogreL  75 mg Per G Tube Daily    enoxaparin  40 mg Subcutaneous Daily    folic acid  1 mg Per G Tube Daily    losartan  50 mg Per G Tube Daily    magnesium citrate  296 mL Per G Tube Once    metoprolol tartrate  200 mg Per G Tube BID    mupirocin   Nasal BID    polyethylene glycol  17 g Per G Tube BID    senna-docusate 8.6-50 mg  1 tablet Per G Tube BID    thiamine  100 mg Per G Tube Daily     PRN Meds:albuterol-ipratropium, bisacodyL, hydrALAZINE, ondansetron, oxyCODONE, oxyCODONE, sodium chloride 0.9%     Review of patient's allergies indicates:  No Known Allergies  Objective:     Vital Signs (24h Range):  Temp:  [97.4 °F (36.3 °C)-99 °F (37.2 °C)] 99 °F (37.2 °C)  Pulse:  [] 90  Resp:  [11-37] 13  SpO2:  [72 %-99 %] 93 %  BP: ()/(49-74) 102/59     Date 01/13/22 0700 - 01/14/22 0659   Shift 2031-0734 2764-3880 1933-2485 24 Hour Total   INTAKE   NG/GT 50   50   Shift Total(mL/kg) 50(0.7)   50(0.7)   OUTPUT   Shift Total(mL/kg)       Weight (kg) 66.9 66.9 66.9 66.9     Lines/Drains/Airways     Drain                 Gastrostomy/Enterostomy 01/04/22 Percutaneous endoscopic gastrostomy (PEG) LUQ 9 days         Closed/Suction Drain 01/04/22 1244 Right;Anterior Thigh Bulb 15 Fr. 8 days         Closed/Suction Drain 01/04/22 1246 Right;Anterior Thigh Bulb 15 Fr. 8 days         Closed/Suction Drain 01/04/22 1450 Left Neck Bulb 15 Fr. 8 days         Closed/Suction Drain 01/04/22 1450 Right Neck Bulb 15 Fr. 8 days         Closed/Suction Drain 01/04/22 1450 Right Neck Bulb 15 Fr. 8 days          Airway                  Surgical Airway 01/04/22 1546 Shiley Cuffed 8 days          Peripheral Intravenous Line                 Peripheral IV - Single Lumen 01/08/22 0416 22 G Anterior;Distal;Left Upper Arm 5 days         Peripheral IV - Single Lumen 01/09/22 1630 18 G Anterior;Distal;Left Forearm 3 days                Physical Exam  Awake and alert, NAD  NC/AT, EOMI, clear sclera  Normal external nose   MMM, rosemary-tongue ALTFF in place with good color that approximates the thigh, good tugar and warm, strong arterial doppler signal, and good venous signal from implantable doppler   Neck soft, advancement flaps in place without duskiness, staples intact, JPx2 on right with s/s output, RACHEL x 1 on left with s/s output  Right neck swelling decreasing and softer   6-0 cuffless trach in place and secured with sherry collar, aerating well  Normal work of breathing, on trach collar   G tube in place    Significant Labs:  CBC:   Recent Labs   Lab 01/13/22 0227   WBC 7.37   RBC 2.68*   HGB 8.0*   HCT 26.8*      *   MCH 29.9   MCHC 29.9*     CMP:   Recent Labs   Lab 01/13/22 0227   *   CALCIUM 8.7      K 4.0   CO2 22*      BUN 20   CREATININE 0.5       Significant Diagnostics:  I have reviewed and interpreted all pertinent imaging results/findings within the past 24 hours.

## 2022-01-14 LAB
ANION GAP SERPL CALC-SCNC: 13 MMOL/L (ref 8–16)
BASOPHILS # BLD AUTO: 0.05 K/UL (ref 0–0.2)
BASOPHILS NFR BLD: 0.5 % (ref 0–1.9)
BUN SERPL-MCNC: 21 MG/DL (ref 8–23)
CALCIUM SERPL-MCNC: 8.9 MG/DL (ref 8.7–10.5)
CHLORIDE SERPL-SCNC: 101 MMOL/L (ref 95–110)
CO2 SERPL-SCNC: 23 MMOL/L (ref 23–29)
CREAT SERPL-MCNC: 0.6 MG/DL (ref 0.5–1.4)
DIFFERENTIAL METHOD: ABNORMAL
EOSINOPHIL # BLD AUTO: 0.6 K/UL (ref 0–0.5)
EOSINOPHIL NFR BLD: 5.6 % (ref 0–8)
ERYTHROCYTE [DISTWIDTH] IN BLOOD BY AUTOMATED COUNT: 14.9 % (ref 11.5–14.5)
EST. GFR  (AFRICAN AMERICAN): >60 ML/MIN/1.73 M^2
EST. GFR  (NON AFRICAN AMERICAN): >60 ML/MIN/1.73 M^2
GLUCOSE SERPL-MCNC: 92 MG/DL (ref 70–110)
HCT VFR BLD AUTO: 25.8 % (ref 40–54)
HGB BLD-MCNC: 7.8 G/DL (ref 14–18)
IMM GRANULOCYTES # BLD AUTO: 0.09 K/UL (ref 0–0.04)
IMM GRANULOCYTES NFR BLD AUTO: 0.9 % (ref 0–0.5)
LYMPHOCYTES # BLD AUTO: 0.7 K/UL (ref 1–4.8)
LYMPHOCYTES NFR BLD: 6.4 % (ref 18–48)
MAGNESIUM SERPL-MCNC: 2.1 MG/DL (ref 1.6–2.6)
MCH RBC QN AUTO: 29.7 PG (ref 27–31)
MCHC RBC AUTO-ENTMCNC: 30.2 G/DL (ref 32–36)
MCV RBC AUTO: 98 FL (ref 82–98)
MONOCYTES # BLD AUTO: 1.5 K/UL (ref 0.3–1)
MONOCYTES NFR BLD: 13.7 % (ref 4–15)
NEUTROPHILS # BLD AUTO: 7.7 K/UL (ref 1.8–7.7)
NEUTROPHILS NFR BLD: 72.9 % (ref 38–73)
NRBC BLD-RTO: 0 /100 WBC
PHOSPHATE SERPL-MCNC: 5.1 MG/DL (ref 2.7–4.5)
PLATELET # BLD AUTO: 399 K/UL (ref 150–450)
PMV BLD AUTO: 8.8 FL (ref 9.2–12.9)
POTASSIUM SERPL-SCNC: 4.3 MMOL/L (ref 3.5–5.1)
RBC # BLD AUTO: 2.63 M/UL (ref 4.6–6.2)
SODIUM SERPL-SCNC: 137 MMOL/L (ref 136–145)
WBC # BLD AUTO: 10.58 K/UL (ref 3.9–12.7)

## 2022-01-14 PROCEDURE — 87070 CULTURE OTHR SPECIMN AEROBIC: CPT | Performed by: STUDENT IN AN ORGANIZED HEALTH CARE EDUCATION/TRAINING PROGRAM

## 2022-01-14 PROCEDURE — 25000003 PHARM REV CODE 250

## 2022-01-14 PROCEDURE — 84100 ASSAY OF PHOSPHORUS: CPT | Performed by: STUDENT IN AN ORGANIZED HEALTH CARE EDUCATION/TRAINING PROGRAM

## 2022-01-14 PROCEDURE — 83735 ASSAY OF MAGNESIUM: CPT | Performed by: STUDENT IN AN ORGANIZED HEALTH CARE EDUCATION/TRAINING PROGRAM

## 2022-01-14 PROCEDURE — 25000003 PHARM REV CODE 250: Performed by: STUDENT IN AN ORGANIZED HEALTH CARE EDUCATION/TRAINING PROGRAM

## 2022-01-14 PROCEDURE — 25000242 PHARM REV CODE 250 ALT 637 W/ HCPCS: Performed by: STUDENT IN AN ORGANIZED HEALTH CARE EDUCATION/TRAINING PROGRAM

## 2022-01-14 PROCEDURE — 87205 SMEAR GRAM STAIN: CPT | Performed by: STUDENT IN AN ORGANIZED HEALTH CARE EDUCATION/TRAINING PROGRAM

## 2022-01-14 PROCEDURE — 20600001 HC STEP DOWN PRIVATE ROOM

## 2022-01-14 PROCEDURE — 94760 N-INVAS EAR/PLS OXIMETRY 1: CPT

## 2022-01-14 PROCEDURE — 63600175 PHARM REV CODE 636 W HCPCS: Performed by: STUDENT IN AN ORGANIZED HEALTH CARE EDUCATION/TRAINING PROGRAM

## 2022-01-14 PROCEDURE — 87077 CULTURE AEROBIC IDENTIFY: CPT | Mod: 59 | Performed by: STUDENT IN AN ORGANIZED HEALTH CARE EDUCATION/TRAINING PROGRAM

## 2022-01-14 PROCEDURE — 27000221 HC OXYGEN, UP TO 24 HOURS

## 2022-01-14 PROCEDURE — 99900035 HC TECH TIME PER 15 MIN (STAT)

## 2022-01-14 PROCEDURE — 94640 AIRWAY INHALATION TREATMENT: CPT

## 2022-01-14 PROCEDURE — 27200966 HC CLOSED SUCTION SYSTEM

## 2022-01-14 PROCEDURE — 25000003 PHARM REV CODE 250: Performed by: OTOLARYNGOLOGY

## 2022-01-14 PROCEDURE — 25000242 PHARM REV CODE 250 ALT 637 W/ HCPCS: Performed by: OTOLARYNGOLOGY

## 2022-01-14 PROCEDURE — 99233 SBSQ HOSP IP/OBS HIGH 50: CPT | Mod: ,,, | Performed by: STUDENT IN AN ORGANIZED HEALTH CARE EDUCATION/TRAINING PROGRAM

## 2022-01-14 PROCEDURE — 87186 SC STD MICRODIL/AGAR DIL: CPT | Performed by: STUDENT IN AN ORGANIZED HEALTH CARE EDUCATION/TRAINING PROGRAM

## 2022-01-14 PROCEDURE — 99233 PR SUBSEQUENT HOSPITAL CARE,LEVL III: ICD-10-PCS | Mod: ,,, | Performed by: STUDENT IN AN ORGANIZED HEALTH CARE EDUCATION/TRAINING PROGRAM

## 2022-01-14 PROCEDURE — 99900026 HC AIRWAY MAINTENANCE (STAT)

## 2022-01-14 PROCEDURE — 94761 N-INVAS EAR/PLS OXIMETRY MLT: CPT

## 2022-01-14 PROCEDURE — 80048 BASIC METABOLIC PNL TOTAL CA: CPT | Performed by: STUDENT IN AN ORGANIZED HEALTH CARE EDUCATION/TRAINING PROGRAM

## 2022-01-14 PROCEDURE — 85025 COMPLETE CBC W/AUTO DIFF WBC: CPT | Performed by: STUDENT IN AN ORGANIZED HEALTH CARE EDUCATION/TRAINING PROGRAM

## 2022-01-14 RX ORDER — TALC
6 POWDER (GRAM) TOPICAL NIGHTLY
Status: DISCONTINUED | OUTPATIENT
Start: 2022-01-14 | End: 2022-01-25 | Stop reason: HOSPADM

## 2022-01-14 RX ORDER — METOPROLOL TARTRATE 50 MG/1
100 TABLET ORAL 2 TIMES DAILY
Status: DISCONTINUED | OUTPATIENT
Start: 2022-01-14 | End: 2022-01-25 | Stop reason: HOSPADM

## 2022-01-14 RX ORDER — GUAIFENESIN 100 MG/5ML
200 SOLUTION ORAL EVERY 4 HOURS
Status: DISCONTINUED | OUTPATIENT
Start: 2022-01-14 | End: 2022-01-25 | Stop reason: HOSPADM

## 2022-01-14 RX ADMIN — SODIUM CHLORIDE 30 MG/ML INHALATION SOLUTION 4 ML: 30 SOLUTION INHALANT at 08:01

## 2022-01-14 RX ADMIN — METOPROLOL TARTRATE 100 MG: 50 TABLET, FILM COATED ORAL at 08:01

## 2022-01-14 RX ADMIN — THIAMINE HCL TAB 100 MG 100 MG: 100 TAB at 09:01

## 2022-01-14 RX ADMIN — SODIUM CHLORIDE 30 MG/ML INHALATION SOLUTION 4 ML: 30 SOLUTION INHALANT at 03:01

## 2022-01-14 RX ADMIN — ONDANSETRON 4 MG: 2 INJECTION INTRAMUSCULAR; INTRAVENOUS at 11:01

## 2022-01-14 RX ADMIN — LOSARTAN POTASSIUM 50 MG: 50 TABLET, FILM COATED ORAL at 09:01

## 2022-01-14 RX ADMIN — BACITRACIN 1 EACH: 500 OINTMENT TOPICAL at 09:01

## 2022-01-14 RX ADMIN — Medication 6 MG: at 08:01

## 2022-01-14 RX ADMIN — FOLIC ACID 1 MG: 1 TABLET ORAL at 09:01

## 2022-01-14 RX ADMIN — ACETAMINOPHEN 650 MG: 325 TABLET ORAL at 11:01

## 2022-01-14 RX ADMIN — ACETAMINOPHEN 650 MG: 325 TABLET ORAL at 12:01

## 2022-01-14 RX ADMIN — SENNOSIDES AND DOCUSATE SODIUM 1 TABLET: 50; 8.6 TABLET ORAL at 09:01

## 2022-01-14 RX ADMIN — GUAIFENESIN 200 MG: 100 SOLUTION ORAL at 06:01

## 2022-01-14 RX ADMIN — GUAIFENESIN 200 MG: 100 SOLUTION ORAL at 09:01

## 2022-01-14 RX ADMIN — ASPIRIN 81 MG CHEWABLE TABLET 81 MG: 81 TABLET CHEWABLE at 09:01

## 2022-01-14 RX ADMIN — ACETAMINOPHEN 650 MG: 325 TABLET ORAL at 06:01

## 2022-01-14 RX ADMIN — OXYCODONE HYDROCHLORIDE 10 MG: 5 SOLUTION ORAL at 07:01

## 2022-01-14 RX ADMIN — ENOXAPARIN SODIUM 40 MG: 40 INJECTION SUBCUTANEOUS at 05:01

## 2022-01-14 RX ADMIN — ATORVASTATIN CALCIUM 20 MG: 20 TABLET, FILM COATED ORAL at 09:01

## 2022-01-14 RX ADMIN — ACETAMINOPHEN 650 MG: 325 TABLET ORAL at 05:01

## 2022-01-14 RX ADMIN — BACITRACIN 1 EACH: 500 OINTMENT TOPICAL at 08:01

## 2022-01-14 RX ADMIN — METOPROLOL TARTRATE 100 MG: 50 TABLET, FILM COATED ORAL at 10:01

## 2022-01-14 RX ADMIN — GUAIFENESIN 200 MG: 100 SOLUTION ORAL at 10:01

## 2022-01-14 RX ADMIN — GUAIFENESIN 200 MG: 100 SOLUTION ORAL at 01:01

## 2022-01-14 RX ADMIN — CLOPIDOGREL 75 MG: 75 TABLET, FILM COATED ORAL at 09:01

## 2022-01-14 RX ADMIN — OXYCODONE HYDROCHLORIDE 10 MG: 5 SOLUTION ORAL at 08:01

## 2022-01-14 RX ADMIN — POLYETHYLENE GLYCOL 3350 17 G: 17 POWDER, FOR SOLUTION ORAL at 09:01

## 2022-01-14 NOTE — ASSESSMENT & PLAN NOTE
71M with [id number] SCC of the oral tongue s/p total glossectomy, bilateral neck dissections levels I-IV, tracheostomy, and ALTFF reconstruction with G tube placement by general surgery. Doing well but with copious secretions.     -Continue q4h flap checks  -Plavix/ASA  -bolus TFs   -pain control (scheduled tylenol, oxycodone PRN)  -trach teaching with respiratory   -PT/OT, OOB    Ppx: L40, SCDs, NIKITA hose     Dispo: okay for step down to PCU, anticipate discharge to rehab with trach and PEG next week

## 2022-01-14 NOTE — PT/OT/SLP PROGRESS
Speech Language Pathology  Pt not seen    Fareed Richard Jr.  MRN: [id number]    Patient not seen today secondary to pt seen from 0815-0819; however pt declined PMSV at this time 2/2 copious secretions. SLP will follow up.   1/14/2022

## 2022-01-14 NOTE — PLAN OF CARE
[facility]  Facility Transfer Orders        Admit to: Rehabilitation facility    Diagnoses:   Active Hospital Problems    Diagnosis  POA    *Squamous cell cancer of tongue [C02.9]  Yes      Resolved Hospital Problems   No resolved problems to display.     Allergies: Review of patient's allergies indicates:  No Known Allergies    Code Status: Full Code    Vitals: Routine       Diet: tube feedings: Bolus Impact Peptide 1.5 5 cans/day - 1875 kcal, 118 g protein, and 965 mL free water     Activity: Light activity only. No heavy lifting (>10lb), straining, stooping, exercising.     Nursing Precautions: Aspiration  and Fall    Bed/Surface: Low Air Loss    Consults: PT to evaluate and treat- 4 times a week, OT to evaluate and treat- 4 times a week, ST to evaluate and treat- 4 times a week and Wound Care    Oxygen: 35% O2 via tracheostomy mask    Dialysis: Patient is not on dialysis.       Pending Diagnostic Studies:     Procedure Component Value Units Date/Time    CK [[id number]]     Order Status: Sent Lab Status: No result     Specimen: Blood     Specimen to Pathology, Surgery ENT [[id number]] Collected: 01/04/22 1519    Order Status: Sent Lab Status: In process Updated: 01/05/22 1015        Imaging: None    Miscellaneous Care:   PEG Care:  Clean site every 24 hours    IV Access: Peripheral     Medications: Discontinue all previous medication orders, if any. See new list below.  Current Discharge Medication List      CONTINUE these medications which have NOT CHANGED    Details   amlodipine (NORVASC) 10 MG tablet Take 10 mg by mouth once daily.  Refills: 3      atorvastatin (LIPITOR) 20 MG tablet Take 20 mg by mouth once daily.  Refills: 3      hydrALAZINE (APRESOLINE) 25 MG tablet Take 25 mg by mouth 3 (three) times daily.      losartan (COZAAR) 100 MG tablet Take 1 tablet by mouth once daily.      metoprolol succinate (TOPROL-XL) 200 MG 24 hr tablet Take 200 mg by mouth 2 (two) times daily.       umeclidinium-vilanteroL (ANORO ELLIPTA) 62.5-25 mcg/actuation DsDv Inhale 1 puff into the lungs once daily. Controller      VENTOLIN HFA 90 mcg/actuation inhaler Inhale 2 puffs into the lungs every 4 (four) hours as needed.  Refills: 0      aspirin (ECOTRIN) 81 MG EC tablet Take 81 mg by mouth once daily.      co-enzyme Q-10 50 mg capsule Take 50 mg by mouth once daily.      multivitamin capsule Take 1 capsule by mouth once daily.      ondansetron (ZOFRAN-ODT) 4 MG TbDL Take 4 mg by mouth as needed.         STOP taking these medications       cloNIDine (CATAPRES) 0.1 MG tablet Comments:   Reason for Stopping:             Follow up:     Immunizations Administered as of 1/13/2022     No immunizations on file.          Unknown COVID-19 vaccination status.    Some patients may experience side effects after vaccination.  These may include fever, headache, muscle or joint aches.  Most symptoms resolve with 24-48 hours and do not require urgent medical evaluation unless they persist for more than 72 hours or symptoms are concerning for an unrelated medical condition.          Bradley Pettit MD

## 2022-01-14 NOTE — PT/OT/SLP PROGRESS
Occupational Therapy      Patient Name:  Fareed Richard Jr.   MRN:  [id number]    Pt just returned back to bed this morning upon OT arrival. Pt with increased secretions, increased SOB and increased pain. OT unable to return again this afternoon and will check pt's status at later date to continue with therapy services as appropriate.     1/14/2022

## 2022-01-14 NOTE — SUBJECTIVE & OBJECTIVE
Interval History: NAEON. Still with copious secretions.       Medications:  Continuous Infusions:   sodium chloride 0.9% Stopped (01/06/22 0636)     Scheduled Meds:   acetaminophen  650 mg Per G Tube Q6H    aspirin  81 mg Per G Tube Daily    atorvastatin  20 mg Per G Tube Daily    bacitracin zinc   Topical (Top) BID    clopidogreL  75 mg Per G Tube Daily    enoxaparin  40 mg Subcutaneous Daily    folic acid  1 mg Per G Tube Daily    losartan  50 mg Per G Tube Daily    magnesium citrate  296 mL Per G Tube Once    metoprolol tartrate  200 mg Per G Tube BID    polyethylene glycol  17 g Per G Tube BID    senna-docusate 8.6-50 mg  1 tablet Per G Tube BID    sodium chloride 3%  4 mL Nebulization Q8H    thiamine  100 mg Per G Tube Daily     PRN Meds:albuterol-ipratropium, bisacodyL, hydrALAZINE, ondansetron, oxyCODONE, oxyCODONE, sodium chloride 0.9%     Review of patient's allergies indicates:  No Known Allergies  Objective:     Vital Signs (24h Range):  Temp:  [97 °F (36.1 °C)-99 °F (37.2 °C)] 98.5 °F (36.9 °C)  Pulse:  [] 98  Resp:  [11-32] 17  SpO2:  [72 %-99 %] 97 %  BP: ()/(51-76) 130/60       Lines/Drains/Airways     Drain                 Gastrostomy/Enterostomy 01/04/22 Percutaneous endoscopic gastrostomy (PEG) LUQ 10 days         Closed/Suction Drain 01/04/22 1450 Left Neck Bulb 15 Fr. 9 days         Closed/Suction Drain 01/04/22 1450 Right Neck Bulb 15 Fr. 9 days         Closed/Suction Drain 01/04/22 1450 Right Neck Bulb 15 Fr. 9 days          Airway                 Surgical Airway 01/04/22 1546 Shiley Cuffed 9 days          Peripheral Intravenous Line                 Peripheral IV - Single Lumen 01/08/22 0416 22 G Anterior;Distal;Left Upper Arm 6 days         Peripheral IV - Single Lumen 01/09/22 1630 18 G Anterior;Distal;Left Forearm 4 days                Physical Exam  Awake and alert, NAD  NC/AT, EOMI, clear sclera  Normal external nose   MMM, rosemary-tongue ALTFF in place with good  color that approximates the thigh, good tugar and warm, strong arterial doppler signal, and good venous signal from implantable doppler   Neck soft, advancement flaps in place without duskiness, staples intact, JPx2 on right with s/s output, RACHEL x 1 on left with s/s output, limited epidermolysis of right neck advancement flap   Right neck swelling resolved   6-0 cuffless trach in place and secured with sherry collar, aerating well  Normal work of breathing, on trach collar   G tube in place    Significant Labs:  CBC:   Recent Labs   Lab 01/14/22  0236   WBC 10.58   RBC 2.63*   HGB 7.8*   HCT 25.8*      MCV 98   MCH 29.7   MCHC 30.2*     CMP:   Recent Labs   Lab 01/14/22  0236   GLU 92   CALCIUM 8.9      K 4.3   CO2 23      BUN 21   CREATININE 0.6       Significant Diagnostics:  I have reviewed and interpreted all pertinent imaging results/findings within the past 24 hours.

## 2022-01-14 NOTE — PROGRESS NOTES
[facility]  Otorhinolaryngology-Head & Neck Surgery  Progress Note    Subjective:     Post-Op Info:  Procedure(s) (LRB):  GLOSSECTOMY (N/A)  TRACHEOTOMY (N/A)  EGD, WITH PEG TUBE INSERTION (N/A)  CREATION, FREE FLAP (Right)  DISSECTION, NECK (Bilateral)   10 Days Post-Op  Hospital Day: 12     Interval History: NAEON. Still with copious secretions.       Medications:  Continuous Infusions:   sodium chloride 0.9% Stopped (01/06/22 0636)     Scheduled Meds:   acetaminophen  650 mg Per G Tube Q6H    aspirin  81 mg Per G Tube Daily    atorvastatin  20 mg Per G Tube Daily    bacitracin zinc   Topical (Top) BID    clopidogreL  75 mg Per G Tube Daily    enoxaparin  40 mg Subcutaneous Daily    folic acid  1 mg Per G Tube Daily    losartan  50 mg Per G Tube Daily    magnesium citrate  296 mL Per G Tube Once    metoprolol tartrate  200 mg Per G Tube BID    polyethylene glycol  17 g Per G Tube BID    senna-docusate 8.6-50 mg  1 tablet Per G Tube BID    sodium chloride 3%  4 mL Nebulization Q8H    thiamine  100 mg Per G Tube Daily     PRN Meds:albuterol-ipratropium, bisacodyL, hydrALAZINE, ondansetron, oxyCODONE, oxyCODONE, sodium chloride 0.9%     Review of patient's allergies indicates:  No Known Allergies  Objective:     Vital Signs (24h Range):  Temp:  [97 °F (36.1 °C)-99 °F (37.2 °C)] 98.5 °F (36.9 °C)  Pulse:  [] 98  Resp:  [11-32] 17  SpO2:  [72 %-99 %] 97 %  BP: ()/(51-76) 130/60       Lines/Drains/Airways     Drain                 Gastrostomy/Enterostomy 01/04/22 Percutaneous endoscopic gastrostomy (PEG) LUQ 10 days         Closed/Suction Drain 01/04/22 1450 Left Neck Bulb 15 Fr. 9 days         Closed/Suction Drain 01/04/22 1450 Right Neck Bulb 15 Fr. 9 days         Closed/Suction Drain 01/04/22 1450 Right Neck Bulb 15 Fr. 9 days          Airway                 Surgical Airway 01/04/22 1546 Shiley Cuffed 9 days          Peripheral Intravenous Line                  Peripheral IV - Single Lumen 01/08/22 0416 22 G Anterior;Distal;Left Upper Arm 6 days         Peripheral IV - Single Lumen 01/09/22 1630 18 G Anterior;Distal;Left Forearm 4 days                Physical Exam  Awake and alert, NAD  NC/AT, EOMI, clear sclera  Normal external nose   MMM, rosemary-tongue ALTFF in place with good color that approximates the thigh, good tugar and warm, strong arterial doppler signal, and good venous signal from implantable doppler   Neck soft, advancement flaps in place without duskiness, staples intact, JPx2 on right with s/s output, RACHEL x 1 on left with s/s output, limited epidermolysis of right neck advancement flap   Right neck swelling resolved   6-0 cuffless trach in place and secured with sherry collar, aerating well  Normal work of breathing, on trach collar   G tube in place    Significant Labs:  CBC:   Recent Labs   Lab 01/14/22 0236   WBC 10.58   RBC 2.63*   HGB 7.8*   HCT 25.8*      MCV 98   MCH 29.7   MCHC 30.2*     CMP:   Recent Labs   Lab 01/14/22 0236   GLU 92   CALCIUM 8.9      K 4.3   CO2 23      BUN 21   CREATININE 0.6       Significant Diagnostics:  I have reviewed and interpreted all pertinent imaging results/findings within the past 24 hours.       Assessment/Plan:     * Squamous cell cancer of tongue  71M with [id number] SCC of the oral tongue s/p total glossectomy, bilateral neck dissections levels I-IV, tracheostomy, and ALTFF reconstruction with G tube placement by general surgery. Doing well but with copious secretions.     -Continue q4h flap checks  -Plavix/ASA  -bolus TFs   -pain control (scheduled tylenol, oxycodone PRN)  -trach teaching with respiratory   -PT/OT, OOB    Ppx: L40, SCDs, NIKITA hose     Dispo: okay for step down to PCU, anticipate discharge to rehab with trach and PEG next week             Annmarie Oliveira MD  Otorhinolaryngology-Head & Neck Surgery  [facility]

## 2022-01-14 NOTE — PROGRESS NOTES
[facility]  Critical Care - Surgery  Progress Note    Patient Name: Fareed Richard Jr.  MRN: [id number]  Admission Date: 1/3/2022  Hospital Length of Stay: 11 days  Code Status: Full Code  Attending Provider: Naeem Smalls MD  Primary Care Provider: Kodi Tubbs MD   Principal Problem: Squamous cell cancer of tongue    Subjective:     Hospital/ICU Course:  Fareed Richard Jr. is a 71 y.o. M with hx of squamous cell carcinoma, HTN, CAD s/p stents, carotid stenosis s/p carotid endarterectomy in 2018, PAD s/p b/l femoral atherectomies and other LE interventions s/p total glossectomy, tracheostomy and free flap reconstruction with ENT on 1/4.  The patient has been doing well since surgery, requiring limited pain control.  He was stepped down to the floor but stepped back after a fall out of bed.       Interval History/Significant Events: The patient continues to have lots of secretions that he needs to use suction to control.  He was started on a hypertonic saline spray yesterday with some improvement.  His BP has been soft on his current med regimen, his metoprolol was held.  He only had 250 cc of intake yesterday.  This morning, the patient reports moderate pain. His flap looks great per ENT and has a strong pulse this morning.    Follow-up For: Procedure(s) (LRB):  GLOSSECTOMY (N/A)  TRACHEOTOMY (N/A)  EGD, WITH PEG TUBE INSERTION (N/A)  CREATION, FREE FLAP (Right)  DISSECTION, NECK (Bilateral)    Post-Operative Day: 10 Days Post-Op    Objective:     Vital Signs (Most Recent):  Temp: 98.4 °F (36.9 °C) (01/14/22 0300)  Pulse: 107 (01/14/22 0500)  Resp: 18 (01/14/22 0500)  BP: (!) 155/71 (01/14/22 0400)  SpO2: 95 % (01/14/22 0506) Vital Signs (24h Range):  Temp:  [97 °F (36.1 °C)-99 °F (37.2 °C)] 98.4 °F (36.9 °C)  Pulse:  [] 107  Resp:  [11-32] 18  SpO2:  [72 %-99 %] 95 %  BP: ()/(50-76) 155/71     Weight: 66.9 kg (147 lb 7.8 oz)  Body mass index is 22.43  kg/m².      Intake/Output Summary (Last 24 hours) at 1/14/2022 0611  Last data filed at 1/14/2022 0200  Gross per 24 hour   Intake 250 ml   Output 1442 ml   Net -1192 ml       Physical Exam  Constitutional:       General: He is not in acute distress.     Appearance: He is not ill-appearing, toxic-appearing or diaphoretic.   HENT:      Head:      Comments: Cervical facial flap soft, appropriate coloring, + triphasic artery  Pulses, CDI     Right Ear: External ear normal.      Left Ear: External ear normal.      Nose: No congestion.      Mouth/Throat:      Mouth: Mucous membranes are moist.   Eyes:      Extraocular Movements: Extraocular movements intact.      Pupils: Pupils are equal, round, and reactive to light.   Cardiovascular:      Rate and Rhythm: Normal rate and regular rhythm.      Pulses: Normal pulses.      Heart sounds: Normal heart sounds. No murmur heard.      Pulmonary:      Effort: Pulmonary effort is normal. No respiratory distress.      Breath sounds: Normal breath sounds. No wheezing.      Comments: Trach collar  6-0 trach  Abdominal:      General: Abdomen is flat. There is no distension.      Palpations: Abdomen is soft.      Tenderness: There is no abdominal tenderness. There is no guarding.      Comments: G tube in place   Musculoskeletal:         General: No swelling.      Cervical back: Normal range of motion and neck supple. Tenderness present. No rigidity.      Comments: Donor Site: R thigh incision CDI, no signs of hematoma. R DP pulses intact. Drains with minimal SS output   Skin:     General: Skin is warm and dry.      Comments: Abrasion left hand, left forearm, left arm. Well bandaged.   Neurological:      General: No focal deficit present.      Mental Status: He is alert and oriented to person, place, and time.   Psychiatric:         Judgment: Judgment normal.         Vents:  Oxygen Concentration (%): 35 (01/14/22 0506)    Lines/Drains/Airways     Drain                  Gastrostomy/Enterostomy 01/04/22 Percutaneous endoscopic gastrostomy (PEG) LUQ 10 days         Closed/Suction Drain 01/04/22 1450 Left Neck Bulb 15 Fr. 9 days         Closed/Suction Drain 01/04/22 1450 Right Neck Bulb 15 Fr. 9 days         Closed/Suction Drain 01/04/22 1450 Right Neck Bulb 15 Fr. 9 days          Airway                 Surgical Airway 01/04/22 1546 Shiley Cuffed 9 days          Peripheral Intravenous Line                 Peripheral IV - Single Lumen 01/08/22 0416 22 G Anterior;Distal;Left Upper Arm 6 days         Peripheral IV - Single Lumen 01/09/22 1630 18 G Anterior;Distal;Left Forearm 4 days                Significant Labs:    CBC/Anemia Profile:  Recent Labs   Lab 01/13/22 0227 01/14/22  0236   WBC 7.37 10.58   HGB 8.0* 7.8*   HCT 26.8* 25.8*    399   * 98   RDW 14.8* 14.9*        Chemistries:  Recent Labs   Lab 01/13/22 0227 01/14/22  0236    137   K 4.0 4.3    101   CO2 22* 23   BUN 20 21   CREATININE 0.5 0.6   CALCIUM 8.7 8.9   MG 2.1 2.1   PHOS 4.7* 5.1*     Significant Imaging:  I have reviewed all pertinent imaging results/findings within the past 24 hours.    Assessment/Plan:     * Squamous cell cancer of tongue  Fareed Richard Jr. is a 71 y.o. M with hx of squamous cell carcinoma, HTN, CAD s/p stents, carotid stenosis s/p carotid endarterectomy, PAD s/p b/l femoral atherectomies and other LE interventions who was admitted to SICU and underwent a glossectomy and face/neck reconstruction with ENT on 1/4/21.    Neuro/Psych:  - No sedation  - Pain controlled: scheduled tylenol, oxycodone/dilaudid prn     #alcohol use  - patient reports drinking 3-4 beers per night, last drink was 1/1/22  - Thiamine/Folate  - Out of window for withdrawal concerns    CV  #CAD s/p stents  #Carotid stenosis s/p endarterectomy  #PVS s/p multiple LE interventions including b/l femoral arthetectomies  - continue atorv, ASA and restarted plavix 1/11/22    #HTN  - Home meds: amlodipine 10,  Metoprolol- BID, Losartan 50. Holding amlodipine and hydralazine at this time.  - decrease to 100 mg Metoprolol BID, cont losartan      Pulm  #Tracheostomy 1/4           #Copious sectretions  - Trach replaced 1/12/22  - on trach collar 8L / 35%humidified O2, wean as tolerated  - has lots of secretions, hypertonic saline spray   - mucinex qd, mucomyst    #COPD  #Former smoker  - venotlin inhaler  - duonebs prn    GI  - Nutrition consult  -F: 100cc FWFs before and after TFs (5 times per day)  -E: replete prn  -N: TF 50 ml/hr, goal  - Continue bowel regimen    Renal  No active issues  - BUN/Cr stable  - UOP adequate, net neg 1.2L over last 24 hours  - will make sure patient is getting FWFs    Heme/Onc  #Squamous Cell Carcinoma of tongue   #s/p total glossectomy, tracheostomy and free flap reconstruction  - q4h flap checks, arterial doppler without issue  - ENT following    ID  - Finished Unasyn course  - Stable WBC    Endo  - no active issues         PPx:   Feeding: NPO, tube feeds at goal.  Analgesia/Sedation: well controlled: tylenol, prn oxycodone  Thromboembolic prevention: lovenox  HOB >30: yes  Stress Ulcer ppx: no    Glucose control: Critical care goal 140-180 g/dl, ISS    Lines/Drains/Airway: trach, PIV, RACHEL x 5:  2 right neck, 1 left neck and 2 right leg, PEG      Dispo/Code Status/Palliative:   -- No Critical Care Needs at this Time, appropriate for step down / Full Code        Critical care was time spent personally by me on the following activities: development of treatment plan with patient or surrogate and bedside caregivers, discussions with consultants, evaluation of patient's response to treatment, examination of patient, ordering and performing treatments and interventions, ordering and review of laboratory studies, ordering and review of radiographic studies, pulse oximetry, re-evaluation of patient's condition.  This critical care time did not overlap with that of any other provider or  involve time for any procedures.     Brenden Dalton, DO  Critical Care - Surgery  [facility]

## 2022-01-14 NOTE — SUBJECTIVE & OBJECTIVE
Interval History/Significant Events: The patient continues to have lots of secretions that he needs to use suction to control.  He was started on a hypertonic saline spray yesterday with some improvement.  His BP has been soft on his current med regimen, his metoprolol was held.  He only had 250 cc of intake yesterday.  This morning, the patient reports moderate pain. His flap looks great per ENT and has a strong pulse this morning.    Follow-up For: Procedure(s) (LRB):  GLOSSECTOMY (N/A)  TRACHEOTOMY (N/A)  EGD, WITH PEG TUBE INSERTION (N/A)  CREATION, FREE FLAP (Right)  DISSECTION, NECK (Bilateral)    Post-Operative Day: 10 Days Post-Op    Objective:     Vital Signs (Most Recent):  Temp: 98.4 °F (36.9 °C) (01/14/22 0300)  Pulse: 107 (01/14/22 0500)  Resp: 18 (01/14/22 0500)  BP: (!) 155/71 (01/14/22 0400)  SpO2: 95 % (01/14/22 0506) Vital Signs (24h Range):  Temp:  [97 °F (36.1 °C)-99 °F (37.2 °C)] 98.4 °F (36.9 °C)  Pulse:  [] 107  Resp:  [11-32] 18  SpO2:  [72 %-99 %] 95 %  BP: ()/(50-76) 155/71     Weight: 66.9 kg (147 lb 7.8 oz)  Body mass index is 22.43 kg/m².      Intake/Output Summary (Last 24 hours) at 1/14/2022 0611  Last data filed at 1/14/2022 0200  Gross per 24 hour   Intake 250 ml   Output 1442 ml   Net -1192 ml       Physical Exam  Constitutional:       General: He is not in acute distress.     Appearance: He is not ill-appearing, toxic-appearing or diaphoretic.   HENT:      Head:      Comments: Cervical facial flap soft, appropriate coloring, + triphasic artery  Pulses, CDI     Right Ear: External ear normal.      Left Ear: External ear normal.      Nose: No congestion.      Mouth/Throat:      Mouth: Mucous membranes are moist.   Eyes:      Extraocular Movements: Extraocular movements intact.      Pupils: Pupils are equal, round, and reactive to light.   Cardiovascular:      Rate and Rhythm: Normal rate and regular rhythm.      Pulses: Normal pulses.      Heart sounds: Normal heart  sounds. No murmur heard.      Pulmonary:      Effort: Pulmonary effort is normal. No respiratory distress.      Breath sounds: Normal breath sounds. No wheezing.      Comments: Trach collar  6-0 trach  Abdominal:      General: Abdomen is flat. There is no distension.      Palpations: Abdomen is soft.      Tenderness: There is no abdominal tenderness. There is no guarding.      Comments: G tube in place   Musculoskeletal:         General: No swelling.      Cervical back: Normal range of motion and neck supple. Tenderness present. No rigidity.      Comments: Donor Site: R thigh incision CDI, no signs of hematoma. R DP pulses intact. Drains with minimal SS output   Skin:     General: Skin is warm and dry.      Comments: Abrasion left hand, left forearm, left arm. Well bandaged.   Neurological:      General: No focal deficit present.      Mental Status: He is alert and oriented to person, place, and time.   Psychiatric:         Judgment: Judgment normal.         Vents:  Oxygen Concentration (%): 35 (01/14/22 0506)    Lines/Drains/Airways     Drain                 Gastrostomy/Enterostomy 01/04/22 Percutaneous endoscopic gastrostomy (PEG) LUQ 10 days         Closed/Suction Drain 01/04/22 1450 Left Neck Bulb 15 Fr. 9 days         Closed/Suction Drain 01/04/22 1450 Right Neck Bulb 15 Fr. 9 days         Closed/Suction Drain 01/04/22 1450 Right Neck Bulb 15 Fr. 9 days          Airway                 Surgical Airway 01/04/22 1546 Shiley Cuffed 9 days          Peripheral Intravenous Line                 Peripheral IV - Single Lumen 01/08/22 0416 22 G Anterior;Distal;Left Upper Arm 6 days         Peripheral IV - Single Lumen 01/09/22 1630 18 G Anterior;Distal;Left Forearm 4 days                Significant Labs:    CBC/Anemia Profile:  Recent Labs   Lab 01/13/22  0227 01/14/22  0236   WBC 7.37 10.58   HGB 8.0* 7.8*   HCT 26.8* 25.8*    399   * 98   RDW 14.8* 14.9*        Chemistries:  Recent Labs   Lab  01/13/22  0227 01/14/22  0236    137   K 4.0 4.3    101   CO2 22* 23   BUN 20 21   CREATININE 0.5 0.6   CALCIUM 8.7 8.9   MG 2.1 2.1   PHOS 4.7* 5.1*     Significant Imaging:  I have reviewed all pertinent imaging results/findings within the past 24 hours.

## 2022-01-14 NOTE — PLAN OF CARE
Orders for home tube feeds:    Impact Peptide 1.5 5 cans/day via G tube - 1875 kcal, 118 g protein, and 965 mL free water

## 2022-01-14 NOTE — ASSESSMENT & PLAN NOTE
Fareed Richard . is a 71 y.o. M with hx of squamous cell carcinoma, HTN, CAD s/p stents, carotid stenosis s/p carotid endarterectomy, PAD s/p b/l femoral atherectomies and other LE interventions who was admitted to SICU and underwent a glossectomy and face/neck reconstruction with ENT on 1/4/21.    Neuro/Psych:  - No sedation  - Pain controlled: scheduled tylenol, oxycodone/dilaudid prn     #alcohol use  - patient reports drinking 3-4 beers per night, last drink was 1/1/22  - Thiamine/Folate  - Out of window for withdrawal concerns    CV  #CAD s/p stents  #Carotid stenosis s/p endarterectomy  #PVS s/p multiple LE interventions including b/l femoral arthetectomies  - continue atorv, ASA and restarted plavix 1/11/22    #HTN  - Home meds: amlodipine 10, Metoprolol- BID, Losartan 50. Holding amlodipine and hydralazine at this time.  - continue Metoprolol, losartan      Pulm  #COPD  #Former smoker  - venotlin inhaler  - duonebs prn    #Tracheostomy 1/4  - Trach replaced 1/12/22  - on trach collar 8L / 35%humidified O2, wean as tolerated  - has lots of secretions, hypertonic saline spray     GI  - Nutrition consult  -F: 100cc FWFs before and after TFs (5 times per day)  -E: replete prn  -N: TF 50 ml/hr, goal  - Continue bowel regimen    Renal  No active issues  - BUN/Cr stable  - UOP adequate, net neg 1.2L over last 24 hours  - will make sure patient is getting FWFs    Heme/Onc  #Squamous Cell Carcinoma of tongue   #s/p total glossectomy, tracheostomy and free flap reconstruction  - q4h flap checks, arterial doppler without issue  - ENT following    ID  - Finished Unasyn course  - Stable WBC    Endo  - no active issues         PPx:   Feeding: NPO, tube feeds at goal.  Analgesia/Sedation: well controlled: tylenol, prn oxycodone  Thromboembolic prevention: lovenox  HOB >30: yes  Stress Ulcer ppx: no    Glucose control: Critical care goal 140-180 g/dl, ISS    Lines/Drains/Airway: trach, PIV, RACHEL x 5:  2 right neck, 1  left neck and 2 right leg, PEG      Dispo/Code Status/Palliative:   -- No Critical Care Needs at this Time, appropriate for step down / Full Code

## 2022-01-14 NOTE — PT/OT/SLP PROGRESS
Physical Therapy      Patient Name:  Fareed Richard Jr.   MRN:  [id number]    Patient not seen today secondary to  (pt on hold due to increased oral secretions, pain and SOB at rest.). Will follow-up at a later date.    1/14/2022  .

## 2022-01-15 PROCEDURE — 20600001 HC STEP DOWN PRIVATE ROOM

## 2022-01-15 PROCEDURE — 25000003 PHARM REV CODE 250: Performed by: STUDENT IN AN ORGANIZED HEALTH CARE EDUCATION/TRAINING PROGRAM

## 2022-01-15 PROCEDURE — 99900026 HC AIRWAY MAINTENANCE (STAT)

## 2022-01-15 PROCEDURE — 25000003 PHARM REV CODE 250

## 2022-01-15 PROCEDURE — 25000003 PHARM REV CODE 250: Performed by: OTOLARYNGOLOGY

## 2022-01-15 PROCEDURE — 25000242 PHARM REV CODE 250 ALT 637 W/ HCPCS: Performed by: OTOLARYNGOLOGY

## 2022-01-15 PROCEDURE — 63600175 PHARM REV CODE 636 W HCPCS: Performed by: STUDENT IN AN ORGANIZED HEALTH CARE EDUCATION/TRAINING PROGRAM

## 2022-01-15 PROCEDURE — 94640 AIRWAY INHALATION TREATMENT: CPT

## 2022-01-15 PROCEDURE — 99900035 HC TECH TIME PER 15 MIN (STAT)

## 2022-01-15 PROCEDURE — 94761 N-INVAS EAR/PLS OXIMETRY MLT: CPT

## 2022-01-15 PROCEDURE — 25000242 PHARM REV CODE 250 ALT 637 W/ HCPCS: Performed by: STUDENT IN AN ORGANIZED HEALTH CARE EDUCATION/TRAINING PROGRAM

## 2022-01-15 PROCEDURE — 27000221 HC OXYGEN, UP TO 24 HOURS

## 2022-01-15 RX ADMIN — OXYCODONE HYDROCHLORIDE 5 MG: 5 SOLUTION ORAL at 11:01

## 2022-01-15 RX ADMIN — GUAIFENESIN 200 MG: 100 SOLUTION ORAL at 09:01

## 2022-01-15 RX ADMIN — ATORVASTATIN CALCIUM 20 MG: 20 TABLET, FILM COATED ORAL at 09:01

## 2022-01-15 RX ADMIN — METOPROLOL TARTRATE 100 MG: 50 TABLET, FILM COATED ORAL at 09:01

## 2022-01-15 RX ADMIN — OXYCODONE HYDROCHLORIDE 10 MG: 5 SOLUTION ORAL at 02:01

## 2022-01-15 RX ADMIN — SODIUM CHLORIDE 30 MG/ML INHALATION SOLUTION 4 ML: 30 SOLUTION INHALANT at 12:01

## 2022-01-15 RX ADMIN — OXYCODONE HYDROCHLORIDE 10 MG: 5 SOLUTION ORAL at 06:01

## 2022-01-15 RX ADMIN — ACETAMINOPHEN 650 MG: 325 TABLET ORAL at 11:01

## 2022-01-15 RX ADMIN — Medication 6 MG: at 09:01

## 2022-01-15 RX ADMIN — SODIUM CHLORIDE 30 MG/ML INHALATION SOLUTION 4 ML: 30 SOLUTION INHALANT at 04:01

## 2022-01-15 RX ADMIN — BACITRACIN 1 EACH: 500 OINTMENT TOPICAL at 09:01

## 2022-01-15 RX ADMIN — THIAMINE HCL TAB 100 MG 100 MG: 100 TAB at 09:01

## 2022-01-15 RX ADMIN — ACETAMINOPHEN 650 MG: 325 TABLET ORAL at 12:01

## 2022-01-15 RX ADMIN — ASPIRIN 81 MG CHEWABLE TABLET 81 MG: 81 TABLET CHEWABLE at 09:01

## 2022-01-15 RX ADMIN — OXYCODONE HYDROCHLORIDE 5 MG: 5 SOLUTION ORAL at 07:01

## 2022-01-15 RX ADMIN — GUAIFENESIN 200 MG: 100 SOLUTION ORAL at 01:01

## 2022-01-15 RX ADMIN — GUAIFENESIN 200 MG: 100 SOLUTION ORAL at 02:01

## 2022-01-15 RX ADMIN — LOSARTAN POTASSIUM 50 MG: 50 TABLET, FILM COATED ORAL at 09:01

## 2022-01-15 RX ADMIN — GUAIFENESIN 200 MG: 100 SOLUTION ORAL at 06:01

## 2022-01-15 RX ADMIN — FOLIC ACID 1 MG: 1 TABLET ORAL at 09:01

## 2022-01-15 RX ADMIN — SODIUM CHLORIDE 30 MG/ML INHALATION SOLUTION 4 ML: 30 SOLUTION INHALANT at 07:01

## 2022-01-15 RX ADMIN — BACITRACIN: 500 OINTMENT TOPICAL at 09:01

## 2022-01-15 RX ADMIN — SENNOSIDES AND DOCUSATE SODIUM 1 TABLET: 50; 8.6 TABLET ORAL at 09:01

## 2022-01-15 RX ADMIN — ENOXAPARIN SODIUM 40 MG: 40 INJECTION SUBCUTANEOUS at 06:01

## 2022-01-15 RX ADMIN — ACETAMINOPHEN 650 MG: 325 TABLET ORAL at 05:01

## 2022-01-15 RX ADMIN — OXYCODONE HYDROCHLORIDE 10 MG: 5 SOLUTION ORAL at 09:01

## 2022-01-15 RX ADMIN — ACETAMINOPHEN 650 MG: 325 TABLET ORAL at 06:01

## 2022-01-15 RX ADMIN — GUAIFENESIN 200 MG: 100 SOLUTION ORAL at 05:01

## 2022-01-15 RX ADMIN — CLOPIDOGREL 75 MG: 75 TABLET, FILM COATED ORAL at 09:01

## 2022-01-15 RX ADMIN — SODIUM CHLORIDE 30 MG/ML INHALATION SOLUTION 4 ML: 30 SOLUTION INHALANT at 11:01

## 2022-01-15 NOTE — SUBJECTIVE & OBJECTIVE
Interval History: NAEON. AFVSS. Stepped down to PCU. No new complaints.     Medications:  Continuous Infusions:   sodium chloride 0.9% Stopped (01/06/22 0636)     Scheduled Meds:   acetaminophen  650 mg Per G Tube Q6H    aspirin  81 mg Per G Tube Daily    atorvastatin  20 mg Per G Tube Daily    bacitracin zinc   Topical (Top) BID    clopidogreL  75 mg Per G Tube Daily    enoxaparin  40 mg Subcutaneous Daily    folic acid  1 mg Per G Tube Daily    guaiFENesin 100 mg/5 ml  200 mg Per G Tube Q4H    losartan  50 mg Per G Tube Daily    magnesium citrate  296 mL Per G Tube Once    melatonin  6 mg Per G Tube Nightly    metoprolol tartrate  100 mg Per G Tube BID    polyethylene glycol  17 g Per G Tube BID    senna-docusate 8.6-50 mg  1 tablet Per G Tube BID    sodium chloride 3%  4 mL Nebulization Q8H    thiamine  100 mg Per G Tube Daily     PRN Meds:albuterol-ipratropium, bisacodyL, hydrALAZINE, ondansetron, oxyCODONE, oxyCODONE, sodium chloride 0.9%     Review of patient's allergies indicates:  No Known Allergies  Objective:     Vital Signs (24h Range):  Temp:  [98.1 °F (36.7 °C)-99.8 °F (37.7 °C)] 98.1 °F (36.7 °C)  Pulse:  [] 92  Resp:  [15-29] 17  SpO2:  [88 %-97 %] 96 %  BP: (104-158)/(54-98) 138/65     Date 01/15/22 0700 - 01/16/22 0659   Shift 1879-4880 1691-4650 4190-5425 24 Hour Total   INTAKE   NG/   200   Shift Total(mL/kg) 200(3)   200(3)   OUTPUT   Urine(mL/kg/hr) 175   175   Shift Total(mL/kg) 175(2.6)   175(2.6)   Weight (kg) 66.9 66.9 66.9 66.9     Lines/Drains/Airways     Drain                 Gastrostomy/Enterostomy 01/04/22 Percutaneous endoscopic gastrostomy (PEG) LUQ 11 days         Closed/Suction Drain 01/04/22 1450 Left Neck Bulb 15 Fr. 10 days         Closed/Suction Drain 01/04/22 1450 Right Neck Bulb 15 Fr. 10 days         Closed/Suction Drain 01/04/22 1450 Right Neck Bulb 15 Fr. 10 days          Airway                 Surgical Airway 01/04/22 1546 Shiley Cuffed 10 days           Peripheral Intravenous Line                 Peripheral IV - Single Lumen 01/08/22 0416 22 G Anterior;Distal;Left Upper Arm 7 days         Peripheral IV - Single Lumen 01/09/22 1630 18 G Anterior;Distal;Left Forearm 5 days                Physical Exam   Awake and alert, NAD  NC/AT, EOMI, clear sclera  Normal external nose   MMM, rosemary-tongue ALTFF in place with good color that approximates the thigh, good tugar and warm, strong arterial doppler signal  Neck soft, advancement flaps in place without duskiness, staples intact, JPx2 on right with s/s output, RACHEL x 1 on left with s/s output, limited epidermolysis of right neck advancement flap   Right neck swelling resolved   6-0 cuffless trach in place and secured with sherry collar, aerating well  Normal work of breathing, on trach collar   G tube in place    Significant Labs:  BMP:   Recent Labs   Lab 01/14/22  0236   GLU 92      CO2 23   BUN 21   CREATININE 0.6   CALCIUM 8.9   MG 2.1     CBC:   Recent Labs   Lab 01/14/22  0236   WBC 10.58   RBC 2.63*   HGB 7.8*   HCT 25.8*      MCV 98   MCH 29.7   MCHC 30.2*       Significant Diagnostics:  None

## 2022-01-15 NOTE — RESPIRATORY THERAPY
[facility] RESPIRATORY THERAPY PROACTIVE NOTE           Time of visit: 102     Code Status: Full Code   : 1950  Bed: / A:   MRN: [id number]  Time spent at the bedside: < 15 min    SITUATION    Evaluated patient for: LDA Check     BACKGROUND    Patient has a past medical history of Cancer, COPD (chronic obstructive pulmonary disease), Hyperlipidemia, Hypertension, and Pseudoaneurysm.  Clinically Significant Surgical Hx: tracheostomy    24 Hours Vitals Range:  Temp:  [98.1 °F (36.7 °C)-99.8 °F (37.7 °C)]   Pulse:  []   Resp:  [15-29]   BP: (110-158)/(54-98)   SpO2:  [88 %-97 %]     Labs:    Recent Labs     22  0227 22  0236    137   K 4.0 4.3    101   CO2 22* 23   CREATININE 0.5 0.6   * 92   PHOS 4.7* 5.1*   MG 2.1 2.1        Recent Labs     22  1144   PH 7.454*   PCO2 42.4   PO2 72*   HCO3 29.7*   POCSATURATED 95   BE 6       ASSESSMENT/INTERVENTIONS    Upon arrival in room pt resting comfortably with no resp needs at this time.    Last VS   Temp: 98.1 °F (36.7 °C) (01/15 0803)  Pulse: 71 (01/15 102)  Resp: 16 (01/15 1027)  BP: 138/65 (01/15 0803)  SpO2: 96 % (01/15 1027)    Level of Consciousness: Level of Consciousness (AVPU): alert  Respiratory Effort: Respiratory Effort: Normal Expansion/Accessory Muscle Usage: Expansion/Accessory Muscles/Retractions: expansion symmetric,no use of accessory muscles,no retractions  All Lung Field Breath Sounds: All Lung Fields Breath Sounds: Anterior:,Lateral:,coarse  DEE Breath Sounds: coarse,wheezes, expiratory  LLL Breath Sounds: diminished  RUL Breath Sounds: coarse  RML Breath Sounds: coarse  RLL Breath Sounds: diminished  O2 Device/Concentration: Flow (L/min): 8, Oxygen Concentration (%): 35, O2 Device (Oxygen Therapy): Trach Collar  Surgical airway: Yes, Type: Shiley Size: 6   Ambu at bedside: Ambu bag with the patient?: Yes, Adult Ambu     Active Orders   Respiratory Care    Chest physiotherapy TID      Frequency: TID     Number of Occurrences: Until Specified     Order Questions:      Indications: COPIOUS SPUTUM PRODUCTION    Inhalation Treatment Q4H PRN     Frequency: Q4H PRN     Number of Occurrences: Until Specified    Oxygen Continuous     Frequency: Continuous     Number of Occurrences: Until Specified     Order Comments: Patient uses 2L at night while at home       Order Questions:      Device type: Low flow      Device: Trach Collar      FiO2%: 35      Titrate O2 per Oxygen Titration Protocol: Yes      To maintain SpO2 goal of: >= 90%      Notify MD of: Inability to achieve desired SpO2; Sudden change in patient status and requires 20% increase in FiO2; Patient requires >60% FiO2    Pulse Oximetry Continuous     Frequency: Continuous     Number of Occurrences: Until Specified    Respiratory care evaluation only Q4H     Frequency: Q4H     Number of Occurrences: Until Specified    RESPIRATORY COMMUNICATION     Frequency: Q4H     Number of Occurrences: Until Specified     Order Comments: Trach care and trach teaching      Routine tracheostomy care     Frequency: BID     Number of Occurrences: Until Specified       RECOMMENDATIONS    We recommend: RRT Recs: Continue POC per primary team.    ESCALATION        FOLLOW-UP    Please call back the Rapid Response RT, Tyrese Foster RRT at x [phone] for any questions or concerns.

## 2022-01-15 NOTE — PLAN OF CARE
Plan of care reviewed with patient, who expressed understanding. Pt describes pain as well controlled without regular use of narcotic pain medications. Pt is able to turn and position themselves, and has gotten out of bed, sat up in the chair, but has not ambulated in the hallways. Pt is tolerating their bolus tube feedings, but nausea has limited his intake to 4 cans. Pt has had a bowel movement 1/13. Pt is able to communicate the need to be suctioned, but is not able to suction himself. Ambulation in the halls should be encouraged this morning after a restful sleep. Frequent rounding is still advisable. Safety is continually stressed.      Problem: Adult Inpatient Plan of Care  Goal: Plan of Care Review  Outcome: Ongoing, Progressing  Goal: Patient-Specific Goal (Individualized)  Outcome: Ongoing, Progressing  Goal: Absence of Hospital-Acquired Illness or Injury  Outcome: Ongoing, Progressing  Goal: Optimal Comfort and Wellbeing  Outcome: Ongoing, Progressing  Goal: Readiness for Transition of Care  Outcome: Ongoing, Progressing     Problem: Infection  Goal: Absence of Infection Signs and Symptoms  Outcome: Ongoing, Progressing     Problem: Fall Injury Risk  Goal: Absence of Fall and Fall-Related Injury  Outcome: Ongoing, Progressing     Problem: Skin Injury Risk Increased  Goal: Skin Health and Integrity  Outcome: Ongoing, Progressing     Problem: Impaired Wound Healing  Goal: Optimal Wound Healing  Outcome: Ongoing, Progressing     Problem: Pain Acute  Goal: Acceptable Pain Control and Functional Ability  Outcome: Ongoing, Progressing     Problem: Activity Intolerance  Goal: Enhanced Capacity and Energy  Outcome: Ongoing, Progressing

## 2022-01-15 NOTE — NURSING TRANSFER
Nursing Transfer Note      1/14/2022     Reason patient is being transferred: Step down    Transfer To: [id number] from [id number]    Transfer via bed    Transfer to O2, cardiac monitoring    Transported by RN    Medicines sent: mucinex, colace, miralax, metoprolol, baci, mupirocin, tylenol, silver nitrate, mag citrate    Any special needs or follow-up needed: NA    Chart send with patient: Yes    Notified: spouse Cat    Patient reassessed at: 1/14/22 @ 2230    Upon arrival to floor: cardiac monitor applied, patient oriented to room, call bell in reach and bed in lowest position

## 2022-01-15 NOTE — PROGRESS NOTES
[facility] - [facility]  Otorhinolaryngology-Head & Neck Surgery  Progress Note    Subjective:     Post-Op Info:  Procedure(s) (LRB):  GLOSSECTOMY (N/A)  TRACHEOTOMY (N/A)  EGD, WITH PEG TUBE INSERTION (N/A)  CREATION, FREE FLAP (Right)  DISSECTION, NECK (Bilateral)   11 Days Post-Op  Hospital Day: 13     Interval History: NAEON. AFVSS. Stepped down to PCU. No new complaints.     Medications:  Continuous Infusions:   sodium chloride 0.9% Stopped (01/06/22 0636)     Scheduled Meds:   acetaminophen  650 mg Per G Tube Q6H    aspirin  81 mg Per G Tube Daily    atorvastatin  20 mg Per G Tube Daily    bacitracin zinc   Topical (Top) BID    clopidogreL  75 mg Per G Tube Daily    enoxaparin  40 mg Subcutaneous Daily    folic acid  1 mg Per G Tube Daily    guaiFENesin 100 mg/5 ml  200 mg Per G Tube Q4H    losartan  50 mg Per G Tube Daily    magnesium citrate  296 mL Per G Tube Once    melatonin  6 mg Per G Tube Nightly    metoprolol tartrate  100 mg Per G Tube BID    polyethylene glycol  17 g Per G Tube BID    senna-docusate 8.6-50 mg  1 tablet Per G Tube BID    sodium chloride 3%  4 mL Nebulization Q8H    thiamine  100 mg Per G Tube Daily     PRN Meds:albuterol-ipratropium, bisacodyL, hydrALAZINE, ondansetron, oxyCODONE, oxyCODONE, sodium chloride 0.9%     Review of patient's allergies indicates:  No Known Allergies  Objective:     Vital Signs (24h Range):  Temp:  [98.1 °F (36.7 °C)-99.8 °F (37.7 °C)] 98.1 °F (36.7 °C)  Pulse:  [] 92  Resp:  [15-29] 17  SpO2:  [88 %-97 %] 96 %  BP: (104-158)/(54-98) 138/65     Date 01/15/22 0700 - 01/16/22 0659   Shift 3300-6499 9276-2288 8363-3437 24 Hour Total   INTAKE   NG/   200   Shift Total(mL/kg) 200(3)   200(3)   OUTPUT   Urine(mL/kg/hr) 175   175   Shift Total(mL/kg) 175(2.6)   175(2.6)   Weight (kg) 66.9 66.9 66.9 66.9     Lines/Drains/Airways     Drain                 Gastrostomy/Enterostomy 01/04/22 Percutaneous endoscopic gastrostomy (PEG) LUQ 11 days          Closed/Suction Drain 01/04/22 1450 Left Neck Bulb 15 Fr. 10 days         Closed/Suction Drain 01/04/22 1450 Right Neck Bulb 15 Fr. 10 days         Closed/Suction Drain 01/04/22 1450 Right Neck Bulb 15 Fr. 10 days          Airway                 Surgical Airway 01/04/22 1546 Shiley Cuffed 10 days          Peripheral Intravenous Line                 Peripheral IV - Single Lumen 01/08/22 0416 22 G Anterior;Distal;Left Upper Arm 7 days         Peripheral IV - Single Lumen 01/09/22 1630 18 G Anterior;Distal;Left Forearm 5 days                Physical Exam   Awake and alert, NAD  NC/AT, EOMI, clear sclera  Normal external nose   MMM, rosemary-tongue ALTFF in place with good color that approximates the thigh, good tugar and warm, strong arterial doppler signal  Neck soft, advancement flaps in place without duskiness, staples intact, JPx2 on right with s/s output, RACHEL x 1 on left with s/s output, limited epidermolysis of right neck advancement flap   Right neck swelling resolved   6-0 cuffless trach in place and secured with sherry collar, aerating well  Normal work of breathing, on trach collar   G tube in place    Significant Labs:  BMP:   Recent Labs   Lab 01/14/22  0236   GLU 92      CO2 23   BUN 21   CREATININE 0.6   CALCIUM 8.9   MG 2.1     CBC:   Recent Labs   Lab 01/14/22  0236   WBC 10.58   RBC 2.63*   HGB 7.8*   HCT 25.8*      MCV 98   MCH 29.7   MCHC 30.2*       Significant Diagnostics:  None    Assessment/Plan:     * Squamous cell cancer of tongue  71M with [id number] SCC of the oral tongue s/p total glossectomy, bilateral neck dissections levels I-IV, tracheostomy, and ALTFF reconstruction with G tube placement by general surgery. Doing well but with copious secretions.     -Continue q4h flap checks  -Plavix/ASA  -bolus TFs   -will remove RACHEL drains  -pain control (scheduled tylenol, oxycodone PRN)  -trach teaching with respiratory   -PT/OT, OOB    Ppx: L40, SCDs, NIKITA hose     Dispo: Anticipate discharge  to rehab with trach and PEG next week             Ramesh Guzman MD  Otorhinolaryngology-Head & Neck Surgery  [facility]

## 2022-01-15 NOTE — ASSESSMENT & PLAN NOTE
71M with [id number] SCC of the oral tongue s/p total glossectomy, bilateral neck dissections levels I-IV, tracheostomy, and ALTFF reconstruction with G tube placement by general surgery. Doing well but with copious secretions.     -Continue q4h flap checks  -Plavix/ASA  -bolus TFs   -will remove RACHEL drains  -pain control (scheduled tylenol, oxycodone PRN)  -trach teaching with respiratory   -PT/OT, OOB    Ppx: L40, SCDs, NIKITA hose     Dispo: Anticipate discharge to rehab with trach and PEG next week

## 2022-01-15 NOTE — PLAN OF CARE
POC reviewed with PT, stated understanding, AOX4, VSS.  UP to chair most of day, standby assist to transfer.  Pain managed with meds per MAR, relief noted.  IX and dsgs WDL.  RACHEL X3 WDL.  Voids per urinal, no BM this shift.  NO adverse events this shift, bed low, call light in reach, will cont to manage POC.

## 2022-01-16 PROCEDURE — 99900035 HC TECH TIME PER 15 MIN (STAT)

## 2022-01-16 PROCEDURE — 20600001 HC STEP DOWN PRIVATE ROOM

## 2022-01-16 PROCEDURE — 25000003 PHARM REV CODE 250: Performed by: OTOLARYNGOLOGY

## 2022-01-16 PROCEDURE — 87077 CULTURE AEROBIC IDENTIFY: CPT | Performed by: STUDENT IN AN ORGANIZED HEALTH CARE EDUCATION/TRAINING PROGRAM

## 2022-01-16 PROCEDURE — 63600175 PHARM REV CODE 636 W HCPCS: Performed by: STUDENT IN AN ORGANIZED HEALTH CARE EDUCATION/TRAINING PROGRAM

## 2022-01-16 PROCEDURE — 99900026 HC AIRWAY MAINTENANCE (STAT)

## 2022-01-16 PROCEDURE — 25000003 PHARM REV CODE 250: Performed by: STUDENT IN AN ORGANIZED HEALTH CARE EDUCATION/TRAINING PROGRAM

## 2022-01-16 PROCEDURE — 25000242 PHARM REV CODE 250 ALT 637 W/ HCPCS: Performed by: OTOLARYNGOLOGY

## 2022-01-16 PROCEDURE — 25000003 PHARM REV CODE 250

## 2022-01-16 PROCEDURE — 25000242 PHARM REV CODE 250 ALT 637 W/ HCPCS: Performed by: STUDENT IN AN ORGANIZED HEALTH CARE EDUCATION/TRAINING PROGRAM

## 2022-01-16 PROCEDURE — 94640 AIRWAY INHALATION TREATMENT: CPT

## 2022-01-16 PROCEDURE — 94761 N-INVAS EAR/PLS OXIMETRY MLT: CPT

## 2022-01-16 PROCEDURE — 87186 SC STD MICRODIL/AGAR DIL: CPT | Performed by: STUDENT IN AN ORGANIZED HEALTH CARE EDUCATION/TRAINING PROGRAM

## 2022-01-16 PROCEDURE — 27000221 HC OXYGEN, UP TO 24 HOURS

## 2022-01-16 PROCEDURE — 87075 CULTR BACTERIA EXCEPT BLOOD: CPT | Performed by: STUDENT IN AN ORGANIZED HEALTH CARE EDUCATION/TRAINING PROGRAM

## 2022-01-16 PROCEDURE — 87070 CULTURE OTHR SPECIMN AEROBIC: CPT | Performed by: STUDENT IN AN ORGANIZED HEALTH CARE EDUCATION/TRAINING PROGRAM

## 2022-01-16 RX ORDER — SODIUM CHLORIDE 9 MG/ML
INJECTION, SOLUTION INTRAVENOUS
Status: DISCONTINUED | OUTPATIENT
Start: 2022-01-16 | End: 2022-01-25 | Stop reason: HOSPADM

## 2022-01-16 RX ORDER — SODIUM CHLORIDE 0.9 % (FLUSH) 0.9 %
3 SYRINGE (ML) INJECTION
Status: DISCONTINUED | OUTPATIENT
Start: 2022-01-16 | End: 2022-01-25 | Stop reason: HOSPADM

## 2022-01-16 RX ADMIN — LOSARTAN POTASSIUM 50 MG: 50 TABLET, FILM COATED ORAL at 09:01

## 2022-01-16 RX ADMIN — GUAIFENESIN 200 MG: 100 SOLUTION ORAL at 09:01

## 2022-01-16 RX ADMIN — ASPIRIN 81 MG CHEWABLE TABLET 81 MG: 81 TABLET CHEWABLE at 09:01

## 2022-01-16 RX ADMIN — ACETAMINOPHEN 650 MG: 325 TABLET ORAL at 06:01

## 2022-01-16 RX ADMIN — ATORVASTATIN CALCIUM 20 MG: 20 TABLET, FILM COATED ORAL at 09:01

## 2022-01-16 RX ADMIN — GUAIFENESIN 200 MG: 100 SOLUTION ORAL at 01:01

## 2022-01-16 RX ADMIN — GUAIFENESIN 200 MG: 100 SOLUTION ORAL at 05:01

## 2022-01-16 RX ADMIN — METOPROLOL TARTRATE 100 MG: 50 TABLET, FILM COATED ORAL at 09:01

## 2022-01-16 RX ADMIN — OXYCODONE HYDROCHLORIDE 10 MG: 5 SOLUTION ORAL at 10:01

## 2022-01-16 RX ADMIN — PIPERACILLIN AND TAZOBACTAM 4.5 G: 4; .5 INJECTION, POWDER, LYOPHILIZED, FOR SOLUTION INTRAVENOUS; PARENTERAL at 06:01

## 2022-01-16 RX ADMIN — ENOXAPARIN SODIUM 40 MG: 40 INJECTION SUBCUTANEOUS at 06:01

## 2022-01-16 RX ADMIN — IPRATROPIUM BROMIDE AND ALBUTEROL SULFATE 3 ML: 2.5; .5 SOLUTION RESPIRATORY (INHALATION) at 10:01

## 2022-01-16 RX ADMIN — IPRATROPIUM BROMIDE AND ALBUTEROL SULFATE 3 ML: 2.5; .5 SOLUTION RESPIRATORY (INHALATION) at 04:01

## 2022-01-16 RX ADMIN — OXYCODONE HYDROCHLORIDE 10 MG: 5 SOLUTION ORAL at 03:01

## 2022-01-16 RX ADMIN — THIAMINE HCL TAB 100 MG 100 MG: 100 TAB at 09:01

## 2022-01-16 RX ADMIN — SODIUM CHLORIDE 30 MG/ML INHALATION SOLUTION 4 ML: 30 SOLUTION INHALANT at 07:01

## 2022-01-16 RX ADMIN — OXYCODONE HYDROCHLORIDE 10 MG: 5 SOLUTION ORAL at 05:01

## 2022-01-16 RX ADMIN — GUAIFENESIN 200 MG: 100 SOLUTION ORAL at 03:01

## 2022-01-16 RX ADMIN — ACETAMINOPHEN 650 MG: 325 TABLET ORAL at 11:01

## 2022-01-16 RX ADMIN — BACITRACIN: 500 OINTMENT TOPICAL at 09:01

## 2022-01-16 RX ADMIN — ACETAMINOPHEN 650 MG: 325 TABLET ORAL at 10:01

## 2022-01-16 RX ADMIN — SODIUM CHLORIDE 30 MG/ML INHALATION SOLUTION 4 ML: 30 SOLUTION INHALANT at 04:01

## 2022-01-16 RX ADMIN — CLOPIDOGREL 75 MG: 75 TABLET, FILM COATED ORAL at 09:01

## 2022-01-16 RX ADMIN — OXYCODONE HYDROCHLORIDE 10 MG: 5 SOLUTION ORAL at 08:01

## 2022-01-16 RX ADMIN — FOLIC ACID 1 MG: 1 TABLET ORAL at 09:01

## 2022-01-16 RX ADMIN — BACITRACIN 1 EACH: 500 OINTMENT TOPICAL at 09:01

## 2022-01-16 RX ADMIN — SENNOSIDES AND DOCUSATE SODIUM 1 TABLET: 50; 8.6 TABLET ORAL at 09:01

## 2022-01-16 RX ADMIN — GUAIFENESIN 200 MG: 100 SOLUTION ORAL at 06:01

## 2022-01-16 RX ADMIN — Medication 6 MG: at 09:01

## 2022-01-16 RX ADMIN — PIPERACILLIN AND TAZOBACTAM 4.5 G: 4; .5 INJECTION, POWDER, LYOPHILIZED, FOR SOLUTION INTRAVENOUS; PARENTERAL at 09:01

## 2022-01-16 NOTE — PLAN OF CARE
Plan of care reviewed with patient, who expressed understanding. Pt describes pain as well controlled without regular use of narcotic pain medications. Pt is able to turn and position themselves, and has gotten out of bed, sat up in the chair . Pt is refusing much of their bolus tube feedings, but denies nausea, and has had a bowel movement 1/13. Ambulation in the halls should be encouraged this morning after a restful sleep.      Problem: Adult Inpatient Plan of Care  Goal: Plan of Care Review  Outcome: Ongoing, Progressing  Goal: Patient-Specific Goal (Individualized)  Outcome: Ongoing, Progressing  Goal: Absence of Hospital-Acquired Illness or Injury  Outcome: Ongoing, Progressing  Goal: Optimal Comfort and Wellbeing  Outcome: Ongoing, Progressing  Goal: Readiness for Transition of Care  Outcome: Ongoing, Progressing     Problem: Infection  Goal: Absence of Infection Signs and Symptoms  Outcome: Ongoing, Progressing     Problem: Fall Injury Risk  Goal: Absence of Fall and Fall-Related Injury  Outcome: Ongoing, Progressing     Problem: Skin Injury Risk Increased  Goal: Skin Health and Integrity  Outcome: Ongoing, Progressing     Problem: Impaired Wound Healing  Goal: Optimal Wound Healing  Outcome: Ongoing, Progressing     Problem: Pain Acute  Goal: Acceptable Pain Control and Functional Ability  Outcome: Ongoing, Progressing     Problem: Activity Intolerance  Goal: Enhanced Capacity and Energy  Outcome: Ongoing, Progressing

## 2022-01-16 NOTE — PROGRESS NOTES
Drop in SpO2 and need to change inner canula with smaller size to fit into trach reviewed with Dr. Chandler. After replacing # 6 shiley canula with # 4 SpO2 altagracia from low 80's back to 90's. Trach appears tilted to the patient's right side. Situation reviewed with Dr. Chandler. No new orders given at this time. Will continue to monitor.

## 2022-01-16 NOTE — PLAN OF CARE
POC reviewed with PT and wife, stated understanding. AOX4, VSS.  Pain somewhat managed with meds per MAR.   O2 needs maintained, with thick yellow secretions, freq SUX required.  Neck IX/Wound dehisced, MD at BS to pack with W/D. Culture sent per order.  UP to chair today, X1 standby assist.   Voids per urinal, low output but meets requirements.   Educated PT on the need to allow for more can of TF through day to facilitate healing and hydration, PT stated understanding. Allowed thus far 1.5 cans at this time. H2O given with med pass as well.  See flow sheet. Will attempt another feed prior to shift end, will update POC and flow sheet.  Bed low, call light in reach, will cont to manage POC.

## 2022-01-16 NOTE — ASSESSMENT & PLAN NOTE
71M with [id number] SCC of the oral tongue s/p total glossectomy, bilateral neck dissections levels I-IV, tracheostomy, and ALTFF reconstruction with G tube placement by general surgery. Doing well but with copious secretions.     -Continue q4h flap checks  -Zosyn for +Citrobacter on resp cx, will DC on enteral omnicef  -Plavix/ASA  -bolus TFs   -RACHEL drains removed  -pain control (scheduled tylenol, oxycodone PRN)  -trach teaching with respiratory   -PT/OT, OOB    Ppx: L40, SCDs, NIKITA hose     Dispo: Anticipate discharge to rehab with trach and PEG next week

## 2022-01-16 NOTE — SUBJECTIVE & OBJECTIVE
Interval History: NAEON. Desats with thick secretions, improves w suctioning. Pt wants to go home. No new complaints at this time. Satting well on 10L TC.     Medications:  Continuous Infusions:   sodium chloride 0.9% Stopped (01/06/22 0636)     Scheduled Meds:   acetaminophen  650 mg Per G Tube Q6H    aspirin  81 mg Per G Tube Daily    atorvastatin  20 mg Per G Tube Daily    bacitracin zinc   Topical (Top) BID    clopidogreL  75 mg Per G Tube Daily    enoxaparin  40 mg Subcutaneous Daily    folic acid  1 mg Per G Tube Daily    guaiFENesin 100 mg/5 ml  200 mg Per G Tube Q4H    losartan  50 mg Per G Tube Daily    magnesium citrate  296 mL Per G Tube Once    melatonin  6 mg Per G Tube Nightly    metoprolol tartrate  100 mg Per G Tube BID    piperacillin-tazobactam (ZOSYN) IVPB  4.5 g Intravenous Q8H    polyethylene glycol  17 g Per G Tube BID    senna-docusate 8.6-50 mg  1 tablet Per G Tube BID    sodium chloride 3%  4 mL Nebulization Q8H    thiamine  100 mg Per G Tube Daily     PRN Meds:albuterol-ipratropium, bisacodyL, hydrALAZINE, ondansetron, oxyCODONE, oxyCODONE, sodium chloride 0.9%     Review of patient's allergies indicates:  No Known Allergies  Objective:     Vital Signs (24h Range):  Temp:  [97.4 °F (36.3 °C)-98.7 °F (37.1 °C)] 97.6 °F (36.4 °C)  Pulse:  [70-91] 83  Resp:  [13-25] 18  SpO2:  [90 %-97 %] 97 %  BP: (104-148)/(57-72) 148/70       Lines/Drains/Airways     Drain                 Gastrostomy/Enterostomy 01/04/22 Percutaneous endoscopic gastrostomy (PEG) LUQ 12 days         Closed/Suction Drain 01/04/22 1450 Left Neck Bulb 15 Fr. 11 days         Closed/Suction Drain 01/04/22 1450 Right Neck Bulb 15 Fr. 11 days         Closed/Suction Drain 01/04/22 1450 Right Neck Bulb 15 Fr. 11 days          Airway                 Surgical Airway 01/04/22 1546 Shiley Cuffed 11 days          Peripheral Intravenous Line                 Peripheral IV - Single Lumen 01/15/22 1952 18 G  Anterior;Proximal;Right Forearm <1 day                Physical Exam   Awake and alert, NAD  NC/AT, EOMI  Normal external nose   MMM, rosemary-tongue ALTFF in place with good color that approximates the thigh, good tugar and warm, strong arterial doppler signal  Neck soft, advancement flaps in place without duskiness, staples intact, limited epidermolysis of right neck advancement flap   6-0 cuffless trach in place and secured with sherry collar, aerating well  Normal work of breathing, on trach collar, thick secretions  G tube in place    Significant Labs:  BMP:   Recent Labs   Lab 01/14/22  0236   GLU 92      CO2 23   BUN 21   CREATININE 0.6   CALCIUM 8.9   MG 2.1     CBC:   Recent Labs   Lab 01/14/22  0236   WBC 10.58   RBC 2.63*   HGB 7.8*   HCT 25.8*      MCV 98   MCH 29.7   MCHC 30.2*     Microbiology Results (last 7 days)     Procedure Component Value Units Date/Time    Culture, Respiratory with Gram Stain [[id number]]  (Abnormal) Collected: 01/14/22 1543    Order Status: Completed Specimen: Respiratory from Tracheal Aspirate Updated: 01/15/22 1138     Respiratory Culture CITROBACTER KOSERI  Moderate  Susceptibility pending  Normal respiratory ishmael also present       Gram Stain (Respiratory) <10 epithelial cells per low power field.     Gram Stain (Respiratory) Moderate WBC's     Gram Stain (Respiratory) Many Gram positive cocci      Gram Stain (Respiratory) Few Gram negative rods          Significant Diagnostics:  I have reviewed and interpreted all pertinent imaging results/findings within the past 24 hours. L lower lobe effusion on CXR

## 2022-01-16 NOTE — NURSING
Increase in swelling to neck IX site, some staples undone, irregular edges to IX at this time, page to team to make aware. Will cont to manage POC.

## 2022-01-16 NOTE — RESPIRATORY THERAPY
RAPID RESPONSE RESPIRATORY THERAPY PROACTIVE NOTE           Time of visit: 1304     Code Status: Full Code   : 1950  Bed: [facility]:   MRN: [id number]  Time spent at the bedside: < 15 min    SITUATION    Evaluated patient for: LDA Check     BACKGROUND    Patient has a past medical history of Cancer, COPD (chronic obstructive pulmonary disease), Hyperlipidemia, Hypertension, and Pseudoaneurysm.  Clinically Significant Surgical Hx: tracheostomy    24 Hours Vitals Range:  Temp:  [97.4 °F (36.3 °C)-98.7 °F (37.1 °C)]   Pulse:  [70-91]   Resp:  [11-25]   BP: (121-148)/(57-72)   SpO2:  [90 %-97 %]     Labs:    Recent Labs     22  0236      K 4.3      CO2 23   CREATININE 0.6   GLU 92   PHOS 5.1*   MG 2.1        No results for input(s): PH, PCO2, PO2, HCO3, POCSATURATED, BE in the last 72 hours.    ASSESSMENT/INTERVENTIONS    Upon arrival in room Pt in chair denies any resp needs at this time.  All safety equipment accounted for.    Last VS   Temp: 97.7 °F (36.5 °C) (1121)  Pulse: 80 (1121)  Resp: 11 (1121)  BP: 135/62 (1121)  SpO2: 94 % (1121)    Level of Consciousness: Level of Consciousness (AVPU): alert  Respiratory Effort: Respiratory Effort: Normal,Unlabored Expansion/Accessory Muscle Usage: Expansion/Accessory Muscles/Retractions: no use of accessory muscles,no retractions,expansion symmetric  All Lung Field Breath Sounds: All Lung Fields Breath Sounds: Anterior:,Lateral:,diminished  DEE Breath Sounds: diminished  LLL Breath Sounds: diminished  RUL Breath Sounds: diminished  RML Breath Sounds: diminished  RLL Breath Sounds: diminished  O2 Device/Concentration: Flow (L/min): 10, Oxygen Concentration (%): 40, O2 Device (Oxygen Therapy): Trach Collar  Surgical airway: Yes, Type: Shiley Size: 6   Ambu at bedside: Ambu bag with the patient?: Yes, Adult Ambu     Active Orders   Respiratory Care    Chest physiotherapy TID     Frequency: TID     Number of Occurrences:  Until Specified     Order Questions:      Indications: COPIOUS SPUTUM PRODUCTION    Inhalation Treatment Q4H PRN     Frequency: Q4H PRN     Number of Occurrences: Until Specified    Oxygen Continuous     Frequency: Continuous     Number of Occurrences: Until Specified     Order Comments: Patient uses 2L at night while at home       Order Questions:      Device type: Low flow      Device: Trach Collar      FiO2%: 35      Titrate O2 per Oxygen Titration Protocol: Yes      To maintain SpO2 goal of: >= 90%      Notify MD of: Inability to achieve desired SpO2; Sudden change in patient status and requires 20% increase in FiO2; Patient requires >60% FiO2    Pulse Oximetry Continuous     Frequency: Continuous     Number of Occurrences: Until Specified    Respiratory care evaluation only Q4H     Frequency: Q4H     Number of Occurrences: Until Specified    RESPIRATORY COMMUNICATION     Frequency: Q4H     Number of Occurrences: Until Specified     Order Comments: Trach care and trach teaching      Routine tracheostomy care     Frequency: BID     Number of Occurrences: Until Specified       RECOMMENDATIONS    We recommend: RRT Recs: Continue POC per primary team.    ESCALATION        FOLLOW-UP    Please call back the Rapid Response RT, Tyrese Foster RRT at x [phone] for any questions or concerns.          No

## 2022-01-16 NOTE — NURSING
Pt refused the remainder of tube feeds and water bolus. Pt educated on the importance of the tube feeds,

## 2022-01-16 NOTE — NURSING
Drainage noted from underneath chin near IX, same color and consistency as drainage from trach secretions. Page to on call to make aware. Will cont to manage POC.

## 2022-01-16 NOTE — PROGRESS NOTES
[facility] - [facility]  Otorhinolaryngology-Head & Neck Surgery  Progress Note    Subjective:     Post-Op Info:  Procedure(s) (LRB):  GLOSSECTOMY (N/A)  TRACHEOTOMY (N/A)  EGD, WITH PEG TUBE INSERTION (N/A)  CREATION, FREE FLAP (Right)  DISSECTION, NECK (Bilateral)   12 Days Post-Op  Hospital Day: 14     Interval History: NAEON. Desats with thick secretions, improves w suctioning. Pt wants to go home. No new complaints at this time. Satting well on 10L TC.     Medications:  Continuous Infusions:   sodium chloride 0.9% Stopped (01/06/22 0636)     Scheduled Meds:   acetaminophen  650 mg Per G Tube Q6H    aspirin  81 mg Per G Tube Daily    atorvastatin  20 mg Per G Tube Daily    bacitracin zinc   Topical (Top) BID    clopidogreL  75 mg Per G Tube Daily    enoxaparin  40 mg Subcutaneous Daily    folic acid  1 mg Per G Tube Daily    guaiFENesin 100 mg/5 ml  200 mg Per G Tube Q4H    losartan  50 mg Per G Tube Daily    magnesium citrate  296 mL Per G Tube Once    melatonin  6 mg Per G Tube Nightly    metoprolol tartrate  100 mg Per G Tube BID    piperacillin-tazobactam (ZOSYN) IVPB  4.5 g Intravenous Q8H    polyethylene glycol  17 g Per G Tube BID    senna-docusate 8.6-50 mg  1 tablet Per G Tube BID    sodium chloride 3%  4 mL Nebulization Q8H    thiamine  100 mg Per G Tube Daily     PRN Meds:albuterol-ipratropium, bisacodyL, hydrALAZINE, ondansetron, oxyCODONE, oxyCODONE, sodium chloride 0.9%     Review of patient's allergies indicates:  No Known Allergies  Objective:     Vital Signs (24h Range):  Temp:  [97.4 °F (36.3 °C)-98.7 °F (37.1 °C)] 97.6 °F (36.4 °C)  Pulse:  [70-91] 83  Resp:  [13-25] 18  SpO2:  [90 %-97 %] 97 %  BP: (104-148)/(57-72) 148/70       Lines/Drains/Airways     Drain                 Gastrostomy/Enterostomy 01/04/22 Percutaneous endoscopic gastrostomy (PEG) LUQ 12 days         Closed/Suction Drain 01/04/22 1450 Left Neck Bulb 15 Fr. 11 days         Closed/Suction Drain 01/04/22 1450 Right  Neck Bulb 15 Fr. 11 days         Closed/Suction Drain 01/04/22 1450 Right Neck Bulb 15 Fr. 11 days          Airway                 Surgical Airway 01/04/22 1546 Shiley Cuffed 11 days          Peripheral Intravenous Line                 Peripheral IV - Single Lumen 01/15/22 1952 18 G Anterior;Proximal;Right Forearm <1 day                Physical Exam   Awake and alert, NAD  NC/AT, EOMI  Normal external nose   MMM, rosemary-tongue ALTFF in place with good color that approximates the thigh, good tugar and warm, strong arterial doppler signal  Neck soft, advancement flaps in place without duskiness, staples intact, limited epidermolysis of right neck advancement flap   6-0 cuffless trach in place and secured with sherry collar, aerating well  Normal work of breathing, on trach collar, thick secretions  G tube in place    Significant Labs:  BMP:   Recent Labs   Lab 01/14/22  0236   GLU 92      CO2 23   BUN 21   CREATININE 0.6   CALCIUM 8.9   MG 2.1     CBC:   Recent Labs   Lab 01/14/22  0236   WBC 10.58   RBC 2.63*   HGB 7.8*   HCT 25.8*      MCV 98   MCH 29.7   MCHC 30.2*     Microbiology Results (last 7 days)     Procedure Component Value Units Date/Time    Culture, Respiratory with Gram Stain [[id number]]  (Abnormal) Collected: 01/14/22 1543    Order Status: Completed Specimen: Respiratory from Tracheal Aspirate Updated: 01/15/22 1138     Respiratory Culture CITROBACTER KOSERI  Moderate  Susceptibility pending  Normal respiratory ishmael also present       Gram Stain (Respiratory) <10 epithelial cells per low power field.     Gram Stain (Respiratory) Moderate WBC's     Gram Stain (Respiratory) Many Gram positive cocci      Gram Stain (Respiratory) Few Gram negative rods          Significant Diagnostics:  I have reviewed and interpreted all pertinent imaging results/findings within the past 24 hours. L lower lobe effusion on CXR    Assessment/Plan:     * Squamous cell cancer of tongue  71M with [id number] SCC of the  oral tongue s/p total glossectomy, bilateral neck dissections levels I-IV, tracheostomy, and ALTFF reconstruction with G tube placement by general surgery. Doing well but with copious secretions.     -Continue q4h flap checks  -Zosyn for +Citrobacter on resp cx, will DC on enteral omnicef  -Plavix/ASA  -bolus TFs   -RACHEL drains removed  -pain control (scheduled tylenol, oxycodone PRN)  -trach teaching with respiratory   -PT/OT, OOB    Ppx: L40, SCDs, NIKITA hose     Dispo: Anticipate discharge to rehab with trach and PEG next week             Ramesh Guzman MD  Otorhinolaryngology-Head & Neck Surgery  [facility]

## 2022-01-17 LAB
ANION GAP SERPL CALC-SCNC: 9 MMOL/L (ref 8–16)
BASOPHILS # BLD AUTO: 0.04 K/UL (ref 0–0.2)
BASOPHILS # BLD AUTO: 0.05 K/UL (ref 0–0.2)
BASOPHILS NFR BLD: 0.4 % (ref 0–1.9)
BASOPHILS NFR BLD: 0.6 % (ref 0–1.9)
BUN SERPL-MCNC: 22 MG/DL (ref 8–23)
CALCIUM SERPL-MCNC: 8.7 MG/DL (ref 8.7–10.5)
CHLORIDE SERPL-SCNC: 98 MMOL/L (ref 95–110)
CO2 SERPL-SCNC: 29 MMOL/L (ref 23–29)
CREAT SERPL-MCNC: 0.6 MG/DL (ref 0.5–1.4)
DIFFERENTIAL METHOD: ABNORMAL
DIFFERENTIAL METHOD: ABNORMAL
EOSINOPHIL # BLD AUTO: 0.2 K/UL (ref 0–0.5)
EOSINOPHIL # BLD AUTO: 0.3 K/UL (ref 0–0.5)
EOSINOPHIL NFR BLD: 2.4 % (ref 0–8)
EOSINOPHIL NFR BLD: 3.4 % (ref 0–8)
ERYTHROCYTE [DISTWIDTH] IN BLOOD BY AUTOMATED COUNT: 14.4 % (ref 11.5–14.5)
ERYTHROCYTE [DISTWIDTH] IN BLOOD BY AUTOMATED COUNT: 14.5 % (ref 11.5–14.5)
EST. GFR  (AFRICAN AMERICAN): >60 ML/MIN/1.73 M^2
EST. GFR  (NON AFRICAN AMERICAN): >60 ML/MIN/1.73 M^2
GLUCOSE SERPL-MCNC: 103 MG/DL (ref 70–110)
HCT VFR BLD AUTO: 21.9 % (ref 40–54)
HCT VFR BLD AUTO: 22.9 % (ref 40–54)
HGB BLD-MCNC: 6.8 G/DL (ref 14–18)
HGB BLD-MCNC: 7.1 G/DL (ref 14–18)
IMM GRANULOCYTES # BLD AUTO: 0.05 K/UL (ref 0–0.04)
IMM GRANULOCYTES # BLD AUTO: 0.06 K/UL (ref 0–0.04)
IMM GRANULOCYTES NFR BLD AUTO: 0.6 % (ref 0–0.5)
IMM GRANULOCYTES NFR BLD AUTO: 0.6 % (ref 0–0.5)
LYMPHOCYTES # BLD AUTO: 0.5 K/UL (ref 1–4.8)
LYMPHOCYTES # BLD AUTO: 0.6 K/UL (ref 1–4.8)
LYMPHOCYTES NFR BLD: 5.7 % (ref 18–48)
LYMPHOCYTES NFR BLD: 6.3 % (ref 18–48)
MCH RBC QN AUTO: 29.2 PG (ref 27–31)
MCH RBC QN AUTO: 29.6 PG (ref 27–31)
MCHC RBC AUTO-ENTMCNC: 31 G/DL (ref 32–36)
MCHC RBC AUTO-ENTMCNC: 31.1 G/DL (ref 32–36)
MCV RBC AUTO: 94 FL (ref 82–98)
MCV RBC AUTO: 95 FL (ref 82–98)
MONOCYTES # BLD AUTO: 1.1 K/UL (ref 0.3–1)
MONOCYTES # BLD AUTO: 1.1 K/UL (ref 0.3–1)
MONOCYTES NFR BLD: 11.2 % (ref 4–15)
MONOCYTES NFR BLD: 13.1 % (ref 4–15)
NEUTROPHILS # BLD AUTO: 6.5 K/UL (ref 1.8–7.7)
NEUTROPHILS # BLD AUTO: 8.1 K/UL (ref 1.8–7.7)
NEUTROPHILS NFR BLD: 76 % (ref 38–73)
NEUTROPHILS NFR BLD: 79.7 % (ref 38–73)
NRBC BLD-RTO: 0 /100 WBC
NRBC BLD-RTO: 0 /100 WBC
PLATELET # BLD AUTO: 448 K/UL (ref 150–450)
PLATELET # BLD AUTO: 453 K/UL (ref 150–450)
PMV BLD AUTO: 8.9 FL (ref 9.2–12.9)
PMV BLD AUTO: 9 FL (ref 9.2–12.9)
POTASSIUM SERPL-SCNC: 4.2 MMOL/L (ref 3.5–5.1)
RBC # BLD AUTO: 2.3 M/UL (ref 4.6–6.2)
RBC # BLD AUTO: 2.43 M/UL (ref 4.6–6.2)
SODIUM SERPL-SCNC: 136 MMOL/L (ref 136–145)
WBC # BLD AUTO: 10.1 K/UL (ref 3.9–12.7)
WBC # BLD AUTO: 8.58 K/UL (ref 3.9–12.7)

## 2022-01-17 PROCEDURE — 20600001 HC STEP DOWN PRIVATE ROOM

## 2022-01-17 PROCEDURE — 94761 N-INVAS EAR/PLS OXIMETRY MLT: CPT

## 2022-01-17 PROCEDURE — 25000003 PHARM REV CODE 250: Performed by: STUDENT IN AN ORGANIZED HEALTH CARE EDUCATION/TRAINING PROGRAM

## 2022-01-17 PROCEDURE — 99900026 HC AIRWAY MAINTENANCE (STAT)

## 2022-01-17 PROCEDURE — 25000003 PHARM REV CODE 250: Performed by: OTOLARYNGOLOGY

## 2022-01-17 PROCEDURE — 25000242 PHARM REV CODE 250 ALT 637 W/ HCPCS: Performed by: STUDENT IN AN ORGANIZED HEALTH CARE EDUCATION/TRAINING PROGRAM

## 2022-01-17 PROCEDURE — 85025 COMPLETE CBC W/AUTO DIFF WBC: CPT | Performed by: OTOLARYNGOLOGY

## 2022-01-17 PROCEDURE — 25000003 PHARM REV CODE 250

## 2022-01-17 PROCEDURE — 80048 BASIC METABOLIC PNL TOTAL CA: CPT | Performed by: STUDENT IN AN ORGANIZED HEALTH CARE EDUCATION/TRAINING PROGRAM

## 2022-01-17 PROCEDURE — 25000242 PHARM REV CODE 250 ALT 637 W/ HCPCS: Performed by: OTOLARYNGOLOGY

## 2022-01-17 PROCEDURE — 94640 AIRWAY INHALATION TREATMENT: CPT

## 2022-01-17 PROCEDURE — 99900035 HC TECH TIME PER 15 MIN (STAT)

## 2022-01-17 PROCEDURE — 85025 COMPLETE CBC W/AUTO DIFF WBC: CPT | Mod: 91 | Performed by: STUDENT IN AN ORGANIZED HEALTH CARE EDUCATION/TRAINING PROGRAM

## 2022-01-17 PROCEDURE — 36415 COLL VENOUS BLD VENIPUNCTURE: CPT | Performed by: STUDENT IN AN ORGANIZED HEALTH CARE EDUCATION/TRAINING PROGRAM

## 2022-01-17 PROCEDURE — 27000221 HC OXYGEN, UP TO 24 HOURS

## 2022-01-17 PROCEDURE — 63600175 PHARM REV CODE 636 W HCPCS: Performed by: STUDENT IN AN ORGANIZED HEALTH CARE EDUCATION/TRAINING PROGRAM

## 2022-01-17 PROCEDURE — 36415 COLL VENOUS BLD VENIPUNCTURE: CPT | Performed by: OTOLARYNGOLOGY

## 2022-01-17 RX ADMIN — PIPERACILLIN AND TAZOBACTAM 4.5 G: 4; .5 INJECTION, POWDER, LYOPHILIZED, FOR SOLUTION INTRAVENOUS; PARENTERAL at 06:01

## 2022-01-17 RX ADMIN — BACITRACIN 1 EACH: 500 OINTMENT TOPICAL at 09:01

## 2022-01-17 RX ADMIN — GUAIFENESIN 200 MG: 100 SOLUTION ORAL at 05:01

## 2022-01-17 RX ADMIN — ACETAMINOPHEN 650 MG: 325 TABLET ORAL at 11:01

## 2022-01-17 RX ADMIN — ACETAMINOPHEN 650 MG: 325 TABLET ORAL at 01:01

## 2022-01-17 RX ADMIN — ASPIRIN 81 MG CHEWABLE TABLET 81 MG: 81 TABLET CHEWABLE at 09:01

## 2022-01-17 RX ADMIN — PIPERACILLIN AND TAZOBACTAM 4.5 G: 4; .5 INJECTION, POWDER, LYOPHILIZED, FOR SOLUTION INTRAVENOUS; PARENTERAL at 01:01

## 2022-01-17 RX ADMIN — ATORVASTATIN CALCIUM 20 MG: 20 TABLET, FILM COATED ORAL at 09:01

## 2022-01-17 RX ADMIN — ENOXAPARIN SODIUM 40 MG: 40 INJECTION SUBCUTANEOUS at 05:01

## 2022-01-17 RX ADMIN — ACETAMINOPHEN 650 MG: 325 TABLET ORAL at 05:01

## 2022-01-17 RX ADMIN — METOPROLOL TARTRATE 100 MG: 50 TABLET, FILM COATED ORAL at 08:01

## 2022-01-17 RX ADMIN — GUAIFENESIN 200 MG: 100 SOLUTION ORAL at 01:01

## 2022-01-17 RX ADMIN — OXYCODONE HYDROCHLORIDE 5 MG: 5 SOLUTION ORAL at 11:01

## 2022-01-17 RX ADMIN — SODIUM CHLORIDE 30 MG/ML INHALATION SOLUTION 4 ML: 30 SOLUTION INHALANT at 04:01

## 2022-01-17 RX ADMIN — METOPROLOL TARTRATE 100 MG: 50 TABLET, FILM COATED ORAL at 09:01

## 2022-01-17 RX ADMIN — BACITRACIN: 500 OINTMENT TOPICAL at 09:01

## 2022-01-17 RX ADMIN — Medication 6 MG: at 08:01

## 2022-01-17 RX ADMIN — CLOPIDOGREL 75 MG: 75 TABLET, FILM COATED ORAL at 09:01

## 2022-01-17 RX ADMIN — SENNOSIDES AND DOCUSATE SODIUM 1 TABLET: 50; 8.6 TABLET ORAL at 09:01

## 2022-01-17 RX ADMIN — GUAIFENESIN 200 MG: 100 SOLUTION ORAL at 08:01

## 2022-01-17 RX ADMIN — OXYCODONE HYDROCHLORIDE 5 MG: 5 SOLUTION ORAL at 03:01

## 2022-01-17 RX ADMIN — SODIUM CHLORIDE: 0.9 INJECTION, SOLUTION INTRAVENOUS at 01:01

## 2022-01-17 RX ADMIN — FOLIC ACID 1 MG: 1 TABLET ORAL at 09:01

## 2022-01-17 RX ADMIN — IPRATROPIUM BROMIDE AND ALBUTEROL SULFATE 3 ML: 2.5; .5 SOLUTION RESPIRATORY (INHALATION) at 12:01

## 2022-01-17 RX ADMIN — LOSARTAN POTASSIUM 50 MG: 50 TABLET, FILM COATED ORAL at 09:01

## 2022-01-17 RX ADMIN — THIAMINE HCL TAB 100 MG 100 MG: 100 TAB at 09:01

## 2022-01-17 RX ADMIN — GUAIFENESIN 200 MG: 100 SOLUTION ORAL at 09:01

## 2022-01-17 RX ADMIN — SODIUM CHLORIDE 30 MG/ML INHALATION SOLUTION 4 ML: 30 SOLUTION INHALANT at 12:01

## 2022-01-17 RX ADMIN — OXYCODONE HYDROCHLORIDE 5 MG: 5 SOLUTION ORAL at 10:01

## 2022-01-17 RX ADMIN — PIPERACILLIN AND TAZOBACTAM 4.5 G: 4; .5 INJECTION, POWDER, LYOPHILIZED, FOR SOLUTION INTRAVENOUS; PARENTERAL at 10:01

## 2022-01-17 NOTE — RESPIRATORY THERAPY
RAPID RESPONSE RESPIRATORY CHART CHECK       Chart check completed, bedside RT, margo contacted, no concerns verbalized at this time, instructed to call [phone] for further concerns or assistance.

## 2022-01-17 NOTE — PROGRESS NOTES
Patient labs this morning resulted with a Hgb of 6.8. MD Guzman made aware. Orders for repeat CBC received and will be carried out. WCTM.

## 2022-01-17 NOTE — SUBJECTIVE & OBJECTIVE
Interval History: NAEON. AFVSS. Called to bedside yesterday PM to assess wound breakdown at L neck.    Medications:  Continuous Infusions:   sodium chloride 0.9% Stopped (01/06/22 0636)     Scheduled Meds:   acetaminophen  650 mg Per G Tube Q6H    aspirin  81 mg Per G Tube Daily    atorvastatin  20 mg Per G Tube Daily    bacitracin zinc   Topical (Top) BID    clopidogreL  75 mg Per G Tube Daily    enoxaparin  40 mg Subcutaneous Daily    folic acid  1 mg Per G Tube Daily    guaiFENesin 100 mg/5 ml  200 mg Per G Tube Q4H    losartan  50 mg Per G Tube Daily    magnesium citrate  296 mL Per G Tube Once    melatonin  6 mg Per G Tube Nightly    metoprolol tartrate  100 mg Per G Tube BID    piperacillin-tazobactam (ZOSYN) IVPB  4.5 g Intravenous Q8H    polyethylene glycol  17 g Per G Tube BID    senna-docusate 8.6-50 mg  1 tablet Per G Tube BID    sodium chloride 3%  4 mL Nebulization Q8H    thiamine  100 mg Per G Tube Daily     PRN Meds:sodium chloride 0.9%, albuterol-ipratropium, bisacodyL, hydrALAZINE, ondansetron, oxyCODONE, oxyCODONE, sodium chloride 0.9%, sodium chloride 0.9%     Review of patient's allergies indicates:  No Known Allergies  Objective:     Vital Signs (24h Range):  Temp:  [97.6 °F (36.4 °C)-99.2 °F (37.3 °C)] 98.3 °F (36.8 °C)  Pulse:  [73-99] 86  Resp:  [11-22] 17  SpO2:  [91 %-99 %] 96 %  BP: (119-148)/(57-79) 139/79       Lines/Drains/Airways     Drain                 Gastrostomy/Enterostomy 01/04/22 Percutaneous endoscopic gastrostomy (PEG) LUQ 13 days          Airway                 Surgical Airway 01/04/22 1546 Shiley Cuffed 12 days          Peripheral Intravenous Line                 Peripheral IV - Single Lumen 01/15/22 1952 18 G Anterior;Proximal;Right Forearm 1 day                Physical Exam  Awake and alert, NAD  NC/AT, EOMI  Normal external nose   MMM, rosemary-tongue ALTFF in place with good color that approximates the thigh, good tugar and warm, strong arterial doppler  signal, intra-oral sutures intact with no breakdown and dehiscence  Neck soft, advancement flaps in place without duskiness, staples intact, limited epidermolysis of right neck advancement flap   Small area of wound breakdown and dehiscence about the L anterolateral neck, with granulation tissue in the wound bed. Stapes removed in this area and the bed probed w cotton applicator for possible fistula tract without success. Wet to dry dressing placed.   6-0 cuffless trach in place and secured with sherry collar, aerating well  Normal work of breathing, on trach collar, thick secretions  G tube in place    Significant Labs:  BMP:   Recent Labs   Lab 01/14/22  0236   GLU 92      CO2 23   BUN 21   CREATININE 0.6   CALCIUM 8.9   MG 2.1     CBC:   Recent Labs   Lab 01/17/22  0755   WBC 8.58   RBC 2.30*   HGB 6.8*   HCT 21.9*   *   MCV 95   MCH 29.6   MCHC 31.1*     Microbiology Results (last 7 days)     Procedure Component Value Units Date/Time    Culture, Respiratory with Gram Stain [[id number]]  (Abnormal)  (Susceptibility) Collected: 01/14/22 1543    Order Status: Completed Specimen: Respiratory from Tracheal Aspirate Updated: 01/17/22 0743     Respiratory Culture CITROBACTER KOSERI  Moderate  Normal respiratory ishmael also present       Gram Stain (Respiratory) <10 epithelial cells per low power field.     Gram Stain (Respiratory) Moderate WBC's     Gram Stain (Respiratory) Many Gram positive cocci      Gram Stain (Respiratory) Few Gram negative rods    Aerobic culture [[id number]] Collected: 01/16/22 1704    Order Status: Completed Specimen: Wound from Neck Updated: 01/17/22 0742     Aerobic Bacterial Culture Insufficient incubation, culture in progress    Culture, Anaerobe [[id number]] Collected: 01/16/22 1704    Order Status: Sent Specimen: Wound from Neck Updated: 01/16/22 1724          Significant Diagnostics:  I have reviewed and interpreted all pertinent imaging results/findings within the past 24  hours.

## 2022-01-17 NOTE — PLAN OF CARE
Plan of care reviewed with patient, who communicated understanding. Pt describes pain as well controlled with regular use of narcotic pain medications. Pt is able to turn and position themselves, and has gotten out of bed, sat up in the chair but has not ambulated in the hallways. Pt is only tolerating about half their tube feedings before refusing- but Pt denies nausea.  Pt has not had a bowel movement since 1/13. Pt has been refusing his valeria lax and has only now been convinced to start taking his stool softeners again. Pt can communicate the need to be suctioned, and uses written communication.  Pt has not however suctioned himself.  Ambulation in the halls and an increase in self care should be encouraged this morning after a restful sleep.       Problem: Adult Inpatient Plan of Care  Goal: Plan of Care Review  Outcome: Ongoing, Progressing  Goal: Patient-Specific Goal (Individualized)  Outcome: Ongoing, Progressing  Goal: Absence of Hospital-Acquired Illness or Injury  Outcome: Ongoing, Progressing  Goal: Optimal Comfort and Wellbeing  Outcome: Ongoing, Progressing  Goal: Readiness for Transition of Care  Outcome: Ongoing, Progressing     Problem: Infection  Goal: Absence of Infection Signs and Symptoms  Outcome: Ongoing, Progressing     Problem: Fall Injury Risk  Goal: Absence of Fall and Fall-Related Injury  Outcome: Ongoing, Progressing     Problem: Skin Injury Risk Increased  Goal: Skin Health and Integrity  Outcome: Ongoing, Progressing     Problem: Impaired Wound Healing  Goal: Optimal Wound Healing  Outcome: Ongoing, Progressing     Problem: Pain Acute  Goal: Acceptable Pain Control and Functional Ability  Outcome: Ongoing, Progressing     Problem: Activity Intolerance  Goal: Enhanced Capacity and Energy  Outcome: Ongoing, Progressing

## 2022-01-17 NOTE — PLAN OF CARE
"Patient A/O x4. Communication impaired due to trach in place. #6 shiley in place. Patient has been having very thick secretions this AM have gotten better throughout the day. 8L 35 % on humidifying trach collar. Patient denies SOB. Patient up in the chair with  x1 assist and up to bedside commode. X2 BM. Has been refusing most of the TF. Patient encouraged on why he should receive TF to help him get better. Patient wrote down  "TF doesn't needs to be stuffed in there. I will start tomorrow". Will attempt to offer him more TF later. G-tube clamped at the moment with 200 water flushes TID. Call light placed in reach. Frequent rounding done. WCTM.  "

## 2022-01-17 NOTE — PROGRESS NOTES
[facility] - [facility]  Otorhinolaryngology-Head & Neck Surgery  Progress Note    Subjective:     Post-Op Info:  Procedure(s) (LRB):  GLOSSECTOMY (N/A)  TRACHEOTOMY (N/A)  EGD, WITH PEG TUBE INSERTION (N/A)  CREATION, FREE FLAP (Right)  DISSECTION, NECK (Bilateral)   13 Days Post-Op  Hospital Day: 15     Interval History: NAEON. AFVSS. Called to bedside yesterday PM to assess wound breakdown at L neck.    Medications:  Continuous Infusions:   sodium chloride 0.9% Stopped (01/06/22 0636)     Scheduled Meds:   acetaminophen  650 mg Per G Tube Q6H    aspirin  81 mg Per G Tube Daily    atorvastatin  20 mg Per G Tube Daily    bacitracin zinc   Topical (Top) BID    clopidogreL  75 mg Per G Tube Daily    enoxaparin  40 mg Subcutaneous Daily    folic acid  1 mg Per G Tube Daily    guaiFENesin 100 mg/5 ml  200 mg Per G Tube Q4H    losartan  50 mg Per G Tube Daily    magnesium citrate  296 mL Per G Tube Once    melatonin  6 mg Per G Tube Nightly    metoprolol tartrate  100 mg Per G Tube BID    piperacillin-tazobactam (ZOSYN) IVPB  4.5 g Intravenous Q8H    polyethylene glycol  17 g Per G Tube BID    senna-docusate 8.6-50 mg  1 tablet Per G Tube BID    sodium chloride 3%  4 mL Nebulization Q8H    thiamine  100 mg Per G Tube Daily     PRN Meds:sodium chloride 0.9%, albuterol-ipratropium, bisacodyL, hydrALAZINE, ondansetron, oxyCODONE, oxyCODONE, sodium chloride 0.9%, sodium chloride 0.9%     Review of patient's allergies indicates:  No Known Allergies  Objective:     Vital Signs (24h Range):  Temp:  [97.6 °F (36.4 °C)-99.2 °F (37.3 °C)] 98.3 °F (36.8 °C)  Pulse:  [73-99] 86  Resp:  [11-22] 17  SpO2:  [91 %-99 %] 96 %  BP: (119-148)/(57-79) 139/79       Lines/Drains/Airways     Drain                 Gastrostomy/Enterostomy 01/04/22 Percutaneous endoscopic gastrostomy (PEG) LUQ 13 days          Airway                 Surgical Airway 01/04/22 1546 Shiley Cuffed 12 days          Peripheral Intravenous Line                  Peripheral IV - Single Lumen 01/15/22 1952 18 G Anterior;Proximal;Right Forearm 1 day                Physical Exam  Awake and alert, NAD  NC/AT, EOMI  Normal external nose   MMM, rosemary-tongue ALTFF in place with good color that approximates the thigh, good tugar and warm, strong arterial doppler signal, intra-oral sutures intact with no breakdown and dehiscence  Neck soft, advancement flaps in place without duskiness, staples intact, limited epidermolysis of right neck advancement flap   Small area of wound breakdown and dehiscence about the L anterolateral neck, with granulation tissue in the wound bed. Stapes removed in this area and the bed probed w cotton applicator for possible fistula tract without success. Wet to dry dressing placed.   6-0 cuffless trach in place and secured with sherry collar, aerating well  Normal work of breathing, on trach collar, thick secretions  G tube in place    Significant Labs:  BMP:   Recent Labs   Lab 01/14/22  0236   GLU 92      CO2 23   BUN 21   CREATININE 0.6   CALCIUM 8.9   MG 2.1     CBC:   Recent Labs   Lab 01/17/22  0755   WBC 8.58   RBC 2.30*   HGB 6.8*   HCT 21.9*   *   MCV 95   MCH 29.6   MCHC 31.1*     Microbiology Results (last 7 days)     Procedure Component Value Units Date/Time    Culture, Respiratory with Gram Stain [[id number]]  (Abnormal)  (Susceptibility) Collected: 01/14/22 1543    Order Status: Completed Specimen: Respiratory from Tracheal Aspirate Updated: 01/17/22 0743     Respiratory Culture CITROBACTER KOSERI  Moderate  Normal respiratory ishmael also present       Gram Stain (Respiratory) <10 epithelial cells per low power field.     Gram Stain (Respiratory) Moderate WBC's     Gram Stain (Respiratory) Many Gram positive cocci      Gram Stain (Respiratory) Few Gram negative rods    Aerobic culture [[id number]] Collected: 01/16/22 1704    Order Status: Completed Specimen: Wound from Neck Updated: 01/17/22 0742     Aerobic Bacterial Culture  Insufficient incubation, culture in progress    Culture, Anaerobe [[id number]] Collected: 01/16/22 1704    Order Status: Sent Specimen: Wound from Neck Updated: 01/16/22 1724          Significant Diagnostics:  I have reviewed and interpreted all pertinent imaging results/findings within the past 24 hours.    Assessment/Plan:     * Squamous cell cancer of tongue  71M with [id number] SCC of the oral tongue s/p total glossectomy, bilateral neck dissections levels I-IV, tracheostomy, and ALTFF reconstruction with G tube placement by general surgery. Doing well but with copious secretions.     -Continue q4h flap checks  -Zosyn for +Citrobacter on resp cx, will DC on enteral omnicef  -Wet to dry dressing to let wound dehiscence  -Encourage TFs, will add supplements to improve wound healing  -Plavix/ASA  -pain control (scheduled tylenol, oxycodone PRN)  -trach teaching with respiratory   -PT/OT, OOB    Ppx: L40, SCDs, NIKITA hose     Dispo: Anticipate discharge to rehab with trach and PEG next week             Ramesh Guzman MD  Otorhinolaryngology-Head & Neck Surgery  [facility]

## 2022-01-17 NOTE — PT/OT/SLP PROGRESS
Occupational Therapy      Patient Name:  Fareed Richard Jr.   MRN:  [id number]    Patient not seen today secondary to Other (Comment) (Per chart review, pt with low H&H and requiring blood with this therapist holding for OT services today for pt safety.). Will follow-up for OT services.    1/17/2022

## 2022-01-17 NOTE — ASSESSMENT & PLAN NOTE
71M with [id number] SCC of the oral tongue s/p total glossectomy, bilateral neck dissections levels I-IV, tracheostomy, and ALTFF reconstruction with G tube placement by general surgery. Doing well but with copious secretions.     -Continue q4h flap checks  -Zosyn for +Citrobacter on resp cx, will DC on enteral omnicef  -Wet to dry dressing to let wound dehiscence  -Encourage TFs, will add supplements to improve wound healing  -Plavix/ASA  -pain control (scheduled tylenol, oxycodone PRN)  -trach teaching with respiratory   -PT/OT, OOB    Ppx: L40, SCDs, NIKITA hose     Dispo: Anticipate discharge to rehab with trach and PEG next week

## 2022-01-17 NOTE — RESPIRATORY THERAPY
RAPID RESPONSE RESPIRATORY THERAPY PROACTIVE NOTE           Time of visit: 1620     Code Status: Full Code   : 1950  [facility]:   MRN: [id number]  Time spent at the bedside: 15 -30 min    SITUATION    Evaluated patient for: LDA Check     BACKGROUND    Patient has a past medical history of Cancer, COPD (chronic obstructive pulmonary disease), Hyperlipidemia, Hypertension, and Pseudoaneurysm.  Clinically Significant Surgical Hx: tracheostomy    24 Hours Vitals Range:  Temp:  [98.2 °F (36.8 °C)-99.2 °F (37.3 °C)]   Pulse:  []   Resp:  [15-]   BP: (111-153)/(57-79)   SpO2:  [91 %-99 %]     Labs:    Recent Labs     22  0755      K 4.2   CL 98   CO2 29   CREATININE 0.6           No results for input(s): PH, PCO2, PO2, HCO3, POCSATURATED, BE in the last 72 hours.    ASSESSMENT/INTERVENTIONS      Last VS   Temp: 98.6 °F (37 °C) ( 155)  Pulse: 90 (1618)  Resp: 22 (1618)  BP: 153/67 ( 155)  SpO2: 99 % (1618)    Level of Consciousness: Level of Consciousness (AVPU): alert  Respiratory Effort: Respiratory Effort: Unlabored,Normal Expansion/Accessory Muscle Usage: Expansion/Accessory Muscles/Retractions: no use of accessory muscles,no retractions,expansion symmetric  All Lung Field Breath Sounds: All Lung Fields Breath Sounds: Anterior:,Posterior:  DEE Breath Sounds: coarse,diminished  LLL Breath Sounds: diminished  RUL Breath Sounds: diminished  RML Breath Sounds: diminished  RLL Breath Sounds: diminished  O2 Device/Concentration: Flow (L/min): 10, Oxygen Concentration (%): 35, O2 Device (Oxygen Therapy): Trach Collar  Surgical airway: Yes, Type: Shiley Size: 6   Ambu at bedside: Ambu bag with the patient?: Yes, Adult Ambu     Active Orders   Respiratory Care    Chest physiotherapy TID     Frequency: TID     Number of Occurrences: Until Specified     Order Questions:      Indications: COPIOUS SPUTUM PRODUCTION    Inhalation Treatment Q4H PRN     Frequency:  Q4H PRN     Number of Occurrences: Until Specified    Oxygen Continuous     Frequency: Continuous     Number of Occurrences: Until Specified     Order Comments: Patient uses 2L at night while at home       Order Questions:      Device type: Low flow      Device: Trach Collar      FiO2%: 35      Titrate O2 per Oxygen Titration Protocol: Yes      To maintain SpO2 goal of: >= 90%      Notify MD of: Inability to achieve desired SpO2; Sudden change in patient status and requires 20% increase in FiO2; Patient requires >60% FiO2    Pulse Oximetry Continuous     Frequency: Continuous     Number of Occurrences: Until Specified    Respiratory care evaluation only Q4H     Frequency: Q4H     Number of Occurrences: Until Specified    RESPIRATORY COMMUNICATION     Frequency: Q4H     Number of Occurrences: Until Specified     Order Comments: Trach care and trach teaching      Routine tracheostomy care     Frequency: BID     Number of Occurrences: Until Specified       RECOMMENDATIONS    We recommend: RRT Recs: Continue POC per primary team.    ESCALATION    POC and orders reviewed with bedside RTmargo.    FOLLOW-UP    Please call back the Rapid Response RT, Gwen Anand, RRT at x [phone] for any questions or concerns.

## 2022-01-18 PROBLEM — Z78.9 IMPAIRED MOBILITY AND ADLS: Status: ACTIVE | Noted: 2022-01-18

## 2022-01-18 PROBLEM — J40 TRACHEOBRONCHITIS: Status: ACTIVE | Noted: 2022-01-18

## 2022-01-18 PROBLEM — Z74.09 IMPAIRED MOBILITY AND ADLS: Status: ACTIVE | Noted: 2022-01-18

## 2022-01-18 LAB
ANION GAP SERPL CALC-SCNC: 10 MMOL/L (ref 8–16)
BACTERIA SPEC AEROBE CULT: ABNORMAL
BASOPHILS # BLD AUTO: 0.05 K/UL (ref 0–0.2)
BASOPHILS NFR BLD: 0.7 % (ref 0–1.9)
BUN SERPL-MCNC: 13 MG/DL (ref 8–23)
CALCIUM SERPL-MCNC: 9 MG/DL (ref 8.7–10.5)
CHLORIDE SERPL-SCNC: 99 MMOL/L (ref 95–110)
CO2 SERPL-SCNC: 28 MMOL/L (ref 23–29)
CREAT SERPL-MCNC: 0.6 MG/DL (ref 0.5–1.4)
DIFFERENTIAL METHOD: ABNORMAL
EOSINOPHIL # BLD AUTO: 0.4 K/UL (ref 0–0.5)
EOSINOPHIL NFR BLD: 5.5 % (ref 0–8)
ERYTHROCYTE [DISTWIDTH] IN BLOOD BY AUTOMATED COUNT: 14.6 % (ref 11.5–14.5)
EST. GFR  (AFRICAN AMERICAN): >60 ML/MIN/1.73 M^2
EST. GFR  (NON AFRICAN AMERICAN): >60 ML/MIN/1.73 M^2
GLUCOSE SERPL-MCNC: 95 MG/DL (ref 70–110)
GRAM STN SPEC: ABNORMAL
HCT VFR BLD AUTO: 23.1 % (ref 40–54)
HGB BLD-MCNC: 7.4 G/DL (ref 14–18)
IMM GRANULOCYTES # BLD AUTO: 0.05 K/UL (ref 0–0.04)
IMM GRANULOCYTES NFR BLD AUTO: 0.7 % (ref 0–0.5)
LYMPHOCYTES # BLD AUTO: 0.7 K/UL (ref 1–4.8)
LYMPHOCYTES NFR BLD: 8.9 % (ref 18–48)
MCH RBC QN AUTO: 30 PG (ref 27–31)
MCHC RBC AUTO-ENTMCNC: 32 G/DL (ref 32–36)
MCV RBC AUTO: 94 FL (ref 82–98)
MONOCYTES # BLD AUTO: 1 K/UL (ref 0.3–1)
MONOCYTES NFR BLD: 13.7 % (ref 4–15)
NEUTROPHILS # BLD AUTO: 5.2 K/UL (ref 1.8–7.7)
NEUTROPHILS NFR BLD: 70.5 % (ref 38–73)
NRBC BLD-RTO: 0 /100 WBC
PLATELET # BLD AUTO: 489 K/UL (ref 150–450)
PMV BLD AUTO: 8.8 FL (ref 9.2–12.9)
POTASSIUM SERPL-SCNC: 3.7 MMOL/L (ref 3.5–5.1)
RBC # BLD AUTO: 2.47 M/UL (ref 4.6–6.2)
SODIUM SERPL-SCNC: 137 MMOL/L (ref 136–145)
WBC # BLD AUTO: 7.31 K/UL (ref 3.9–12.7)

## 2022-01-18 PROCEDURE — 92507 TX SP LANG VOICE COMM INDIV: CPT

## 2022-01-18 PROCEDURE — 25000003 PHARM REV CODE 250

## 2022-01-18 PROCEDURE — 25000003 PHARM REV CODE 250: Performed by: OTOLARYNGOLOGY

## 2022-01-18 PROCEDURE — 25000242 PHARM REV CODE 250 ALT 637 W/ HCPCS: Performed by: OTOLARYNGOLOGY

## 2022-01-18 PROCEDURE — 63600175 PHARM REV CODE 636 W HCPCS: Performed by: STUDENT IN AN ORGANIZED HEALTH CARE EDUCATION/TRAINING PROGRAM

## 2022-01-18 PROCEDURE — 94640 AIRWAY INHALATION TREATMENT: CPT

## 2022-01-18 PROCEDURE — 36415 COLL VENOUS BLD VENIPUNCTURE: CPT | Performed by: STUDENT IN AN ORGANIZED HEALTH CARE EDUCATION/TRAINING PROGRAM

## 2022-01-18 PROCEDURE — 27000221 HC OXYGEN, UP TO 24 HOURS

## 2022-01-18 PROCEDURE — 94761 N-INVAS EAR/PLS OXIMETRY MLT: CPT

## 2022-01-18 PROCEDURE — 80048 BASIC METABOLIC PNL TOTAL CA: CPT | Performed by: STUDENT IN AN ORGANIZED HEALTH CARE EDUCATION/TRAINING PROGRAM

## 2022-01-18 PROCEDURE — 99222 1ST HOSP IP/OBS MODERATE 55: CPT | Mod: ,,, | Performed by: NURSE PRACTITIONER

## 2022-01-18 PROCEDURE — 99900026 HC AIRWAY MAINTENANCE (STAT)

## 2022-01-18 PROCEDURE — 20600001 HC STEP DOWN PRIVATE ROOM

## 2022-01-18 PROCEDURE — 99223 PR INITIAL HOSPITAL CARE,LEVL III: ICD-10-PCS | Mod: GC,,, | Performed by: INTERNAL MEDICINE

## 2022-01-18 PROCEDURE — 85025 COMPLETE CBC W/AUTO DIFF WBC: CPT | Performed by: STUDENT IN AN ORGANIZED HEALTH CARE EDUCATION/TRAINING PROGRAM

## 2022-01-18 PROCEDURE — 99222 PR INITIAL HOSPITAL CARE,LEVL II: ICD-10-PCS | Mod: ,,, | Performed by: NURSE PRACTITIONER

## 2022-01-18 PROCEDURE — 92526 ORAL FUNCTION THERAPY: CPT

## 2022-01-18 PROCEDURE — 99223 1ST HOSP IP/OBS HIGH 75: CPT | Mod: GC,,, | Performed by: INTERNAL MEDICINE

## 2022-01-18 PROCEDURE — 99900035 HC TECH TIME PER 15 MIN (STAT)

## 2022-01-18 PROCEDURE — 25000003 PHARM REV CODE 250: Performed by: STUDENT IN AN ORGANIZED HEALTH CARE EDUCATION/TRAINING PROGRAM

## 2022-01-18 RX ADMIN — ACETAMINOPHEN 650 MG: 325 TABLET ORAL at 06:01

## 2022-01-18 RX ADMIN — SODIUM CHLORIDE 30 MG/ML INHALATION SOLUTION 4 ML: 30 SOLUTION INHALANT at 12:01

## 2022-01-18 RX ADMIN — GUAIFENESIN 200 MG: 100 SOLUTION ORAL at 01:01

## 2022-01-18 RX ADMIN — THIAMINE HCL TAB 100 MG 100 MG: 100 TAB at 09:01

## 2022-01-18 RX ADMIN — CLOPIDOGREL 75 MG: 75 TABLET, FILM COATED ORAL at 09:01

## 2022-01-18 RX ADMIN — Medication 6 MG: at 08:01

## 2022-01-18 RX ADMIN — METOPROLOL TARTRATE 100 MG: 50 TABLET, FILM COATED ORAL at 09:01

## 2022-01-18 RX ADMIN — METOPROLOL TARTRATE 100 MG: 50 TABLET, FILM COATED ORAL at 08:01

## 2022-01-18 RX ADMIN — LOSARTAN POTASSIUM 50 MG: 50 TABLET, FILM COATED ORAL at 09:01

## 2022-01-18 RX ADMIN — GUAIFENESIN 200 MG: 100 SOLUTION ORAL at 04:01

## 2022-01-18 RX ADMIN — GUAIFENESIN 200 MG: 100 SOLUTION ORAL at 09:01

## 2022-01-18 RX ADMIN — FOLIC ACID 1 MG: 1 TABLET ORAL at 09:01

## 2022-01-18 RX ADMIN — ATORVASTATIN CALCIUM 20 MG: 20 TABLET, FILM COATED ORAL at 09:01

## 2022-01-18 RX ADMIN — SODIUM CHLORIDE 30 MG/ML INHALATION SOLUTION 4 ML: 30 SOLUTION INHALANT at 03:01

## 2022-01-18 RX ADMIN — GUAIFENESIN 200 MG: 100 SOLUTION ORAL at 06:01

## 2022-01-18 RX ADMIN — BACITRACIN 1 EACH: 500 OINTMENT TOPICAL at 09:01

## 2022-01-18 RX ADMIN — ASPIRIN 81 MG CHEWABLE TABLET 81 MG: 81 TABLET CHEWABLE at 09:01

## 2022-01-18 RX ADMIN — SODIUM CHLORIDE 30 MG/ML INHALATION SOLUTION 4 ML: 30 SOLUTION INHALANT at 11:01

## 2022-01-18 RX ADMIN — GUAIFENESIN 200 MG: 100 SOLUTION ORAL at 02:01

## 2022-01-18 RX ADMIN — PIPERACILLIN AND TAZOBACTAM 4.5 G: 4; .5 INJECTION, POWDER, LYOPHILIZED, FOR SOLUTION INTRAVENOUS; PARENTERAL at 02:01

## 2022-01-18 RX ADMIN — ACETAMINOPHEN 650 MG: 325 TABLET ORAL at 04:01

## 2022-01-18 RX ADMIN — BACITRACIN 1 EACH: 500 OINTMENT TOPICAL at 08:01

## 2022-01-18 RX ADMIN — ENOXAPARIN SODIUM 40 MG: 40 INJECTION SUBCUTANEOUS at 06:01

## 2022-01-18 RX ADMIN — GUAIFENESIN 200 MG: 100 SOLUTION ORAL at 08:01

## 2022-01-18 RX ADMIN — PIPERACILLIN AND TAZOBACTAM 4.5 G: 4; .5 INJECTION, POWDER, LYOPHILIZED, FOR SOLUTION INTRAVENOUS; PARENTERAL at 09:01

## 2022-01-18 RX ADMIN — SODIUM CHLORIDE 30 MG/ML INHALATION SOLUTION 4 ML: 30 SOLUTION INHALANT at 10:01

## 2022-01-18 RX ADMIN — PIPERACILLIN AND TAZOBACTAM 4.5 G: 4; .5 INJECTION, POWDER, LYOPHILIZED, FOR SOLUTION INTRAVENOUS; PARENTERAL at 06:01

## 2022-01-18 RX ADMIN — ACETAMINOPHEN 650 MG: 325 TABLET ORAL at 12:01

## 2022-01-18 NOTE — HOSPITAL COURSE
PT-01/13     Bed Mobility:   Supine to Sit: stand by assistance; HOB elevated   Pt continues to required increased time but was able to perform the transfer faster this session  Scooting anteriorly to EOB to have both feet planted on floor: stand by assistance     Sitting Balance at Edge of Bed:  Assistance Level Required: Stand-by Assistance  Comments:   Pt reported slight dizziness while sitting EOB - VSS. Increased time spent on deep breathing and keeping eyes open   Worked on activity tolerance sitting EOB and worked on maintaining tolerance to positional change   Pt attempt to don sock while sitting EOB but was unsucessful     Transfers:   Sit <> Stand Transfer: contact guard assistance with rolling walker x 2 trials (EOB, chair)                 Gait:  Patient ambulated: 10ft + 25ft (seated rest break between trials) within room  Pt also required brief occasional standing rest breaks to perform deep breathing  Cuing provided to keep eyes open  Patient required: contact guard  Patient used: rolling walker  Gait Pattern observed: reciprocal gait  Gait Deviation(s): occasional unsteady gait, decreased step length, flexed posture and decreased tammi  Impairments due to: impaired balance, decreased strength and decreased endurance  Comments:   Verbal cuing for pacing, deep breathing, and RW management  Verbal cuing for upright posture and direction  Worked on increasing cardiovascular and muscular endurance and increasing B LE strength     OT- 01/13    Bed Mobility:    Patient completed Supine to Sit with SBA with HOB elevated  Patient completed Anterior Scooting to place B feet on floor with SBA     Functional Mobility/Transfers:  Patient completed Sit <> Stand Transfer with contact guard assistance  with  rolling walker    1x from EOB  1x from bedside chair  Cues provided for correct hand placement  Functional Mobility: Patient ambulated within room with CGA with RW

## 2022-01-18 NOTE — MEDICAL/APP STUDENT
[facility] Medicine  Student History and Physical    Patient Name: Fareed Richard Jr.  MRN: [id number]   Admission Date: 1/3/2022  Length of Stay: 15 days  Attending Physician: Naeem Smalls MD        Subjective:     Principal Problem:Squamous cell cancer of tongue    Chief Complaint:   No chief complaint on file.    HPI:  71M with history of squamous cell carcinoma, CAD (s/p stent placement, carotid stenosis s/p carotid endarterectomy in 2018), PAD s/p bilateral femoral atherectomies. Presented for removal of SCC in neck with ENT. Surgery took place on 1/4/21. Patient had increased secretions leading to sputum culture on 1/14/22 which showed citrobacter koseri and pseudomonas aeruginosa. Sputum culture positive for proteus mirabilis 1/16/21. All showed sensitivity to fluoroquinolones. Consulted for outpatient abx recommendations. Patient had brief desaturations to 91% O2 on 1/17/21 which were relieved with suctioning.     Today the patient is unable to speak well due to his recent surgery and trach tube placement, but is able to communicate clearly via nodding his head for yes/no questions. He also has a small white board to write on when necessary. The patient reports his pain is well-controlled, he has had no shortness of breath, fever, headache, stomach or chest pain. He remains in good spirits and shows no signs of confusion. He reports feeling better than yesterday and feels he is improving overall. HE is breathing appropriately and the surgical site looks benign.       Review of Systems   Constitutional: Negative for chills, diaphoresis and fever.   Respiratory: Negative for apnea, chest tightness and shortness of breath.    Cardiovascular: Negative for chest pain.   Gastrointestinal: Negative for abdominal pain, diarrhea and nausea.   Endocrine: Negative for cold intolerance and heat intolerance.   Genitourinary: Negative for dysuria, flank pain and testicular pain.   Musculoskeletal:  Negative for back pain, gait problem and joint swelling.   Skin:        R side surgical scar and tube looks clean    Allergic/Immunologic: Negative for environmental allergies and food allergies.   Neurological: Negative for seizures, syncope and headaches.   Hematological: Negative for adenopathy.   Psychiatric/Behavioral: Negative for agitation, behavioral problems and confusion.       Past Medical History:   Diagnosis Date    Cancer     skin to Rt forearm and face    COPD (chronic obstructive pulmonary disease)     Hyperlipidemia     Hypertension     Pseudoaneurysm      Past Surgical History:   Procedure Laterality Date    ABDOMINAL SURGERY      stents placed in liver and large intestines, per patient    CAROTID STENT      CORONARY STENT PLACEMENT  01/2000    DISSECTION OF NECK Bilateral 1/4/2022    Procedure: DISSECTION, NECK;  Surgeon: Naeem Smalls MD;  Location: [facility] OR [address] [place] [address];  Service: ENT;  Laterality: Bilateral;    ESOPHAGOGASTRODUODENOSCOPY W/ PEG N/A 1/4/2022    Procedure: EGD, WITH PEG TUBE INSERTION;  Surgeon: Anthony Calabrese MD;  Location: [address];  Service: General;  Laterality: N/A;    EYE SURGERY      Cataract bilateral    femoral stents      bilateral    FLAP PROCEDURE N/A 1/3/2022    Procedure: CREATION, FREE FLAP;  Surgeon: Naeem Smalls MD;  Location: [address];  Service: ENT;  Laterality: N/A;    FLAP PROCEDURE Right 1/4/2022    Procedure: CREATION, FREE FLAP;  Surgeon: Stacey Conde MD;  Location: [facility] OR [address] [place] [address];  Service: ENT;  Laterality: Right;  Ischemic start 1203  Ischemic stop 1353    GLOSSECTOMY N/A 1/4/2022    Procedure: GLOSSECTOMY;  Surgeon: Naeem Smalls MD;  Location: [address];  Service: ENT;  Laterality: N/A;    RADICAL NECK DISSECTION Left 1/3/2022    Procedure: DISSECTION, NECK, RADICAL;  Surgeon: Naeem Smalls MD;  Location: [facility] OR [address] [place] [address];  Service: ENT;  Laterality: Left;    SKIN CANCER EXCISION       TRACHEOTOMY N/A 1/4/2022    Procedure: TRACHEOTOMY;  Surgeon: Naeem Smalls MD;  Location: [facility] OR [address] [place] [address];  Service: ENT;  Laterality: N/A;      Review of patient's allergies indicates:  No Known Allergies  No current facility-administered medications on file prior to encounter.     Current Outpatient Medications on File Prior to Encounter   Medication Sig    amlodipine (NORVASC) 10 MG tablet Take 10 mg by mouth once daily.    atorvastatin (LIPITOR) 20 MG tablet Take 20 mg by mouth once daily.    hydrALAZINE (APRESOLINE) 25 MG tablet Take 25 mg by mouth 3 (three) times daily.    losartan (COZAAR) 100 MG tablet Take 1 tablet by mouth once daily.    metoprolol succinate (TOPROL-XL) 200 MG 24 hr tablet Take 200 mg by mouth 2 (two) times daily.    umeclidinium-vilanteroL (ANORO ELLIPTA) 62.5-25 mcg/actuation DsDv Inhale 1 puff into the lungs once daily. Controller    VENTOLIN HFA 90 mcg/actuation inhaler Inhale 2 puffs into the lungs every 4 (four) hours as needed.    aspirin (ECOTRIN) 81 MG EC tablet Take 81 mg by mouth once daily.    co-enzyme Q-10 50 mg capsule Take 50 mg by mouth once daily.    multivitamin capsule Take 1 capsule by mouth once daily.    ondansetron (ZOFRAN-ODT) 4 MG TbDL Take 4 mg by mouth as needed.      History reviewed. No pertinent family history.  Tobacco Use    Smoking status: Former Smoker     Packs/day: 2.00     Years: 40.00     Pack years: 80.00     Types: Cigarettes     Start date: 4/17/1963     Quit date: 4/17/2018     Years since quitting: 3.7    Smokeless tobacco: Never Used    Tobacco comment: 3/3 ppd x 40 yrs. Currently 3-4 cigarettes daily .He is trying  to quit. Is using a Vapor cigarettes  2-3 x's a day.   Substance and Sexual Activity    Alcohol use: Yes     Alcohol/week: 3.0 standard drinks     Types: 3 Cans of beer per week     Comment: beer daily 3-4    Drug use: No    Sexual activity: Not Currently       Objective:     Vital Signs (Most Recent):  Temp:  97.9 °F (36.6 °C) (01/18/22 0800)  Pulse: 88 (01/18/22 1008)  Resp: 20 (01/18/22 1008)  BP: 118/68 (01/18/22 0800)  SpO2: 95 % (01/18/22 1008) Vital Signs (24h Range):  Temp:  [97.9 °F (36.6 °C)-98.6 °F (37 °C)] 97.9 °F (36.6 °C)  Pulse:  [] 88  Resp:  [16-22] 20  SpO2:  [91 %-100 %] 95 %  BP: (100-153)/(61-70) 118/68     Weight: 66.9 kg (147 lb 7.8 oz)  Body mass index is 22.43 kg/m².    Intake/Output Summary (Last 24 hours) at 1/18/2022 1026  Last data filed at 1/18/2022 0520  Gross per 24 hour   Intake 525 ml   Output 1125 ml   Net -600 ml      Physical Exam  Constitutional:       General: He is not in acute distress.     Appearance: He is not ill-appearing.   HENT:      Head: Normocephalic.      Mouth/Throat:      Mouth: Mucous membranes are dry.      Comments: Unable to speak well due to recent neck surgery. Surgical site looks clean on observation  Eyes:      General: No scleral icterus.     Conjunctiva/sclera: Conjunctivae normal.   Cardiovascular:      Rate and Rhythm: Normal rate and regular rhythm.      Pulses: Normal pulses.   Pulmonary:      Effort: Pulmonary effort is normal. No respiratory distress.   Chest:      Chest wall: No tenderness.   Abdominal:      General: Abdomen is flat.      Palpations: Abdomen is soft.      Tenderness: There is no abdominal tenderness.   Musculoskeletal:         General: No swelling or tenderness.      Cervical back: No rigidity.   Lymphadenopathy:      Cervical: No cervical adenopathy.   Skin:     Coloration: Skin is not jaundiced.      Findings: No erythema.   Neurological:      Mental Status: He is alert and oriented to person, place, and time. Mental status is at baseline.   Psychiatric:         Mood and Affect: Mood normal.         Behavior: Behavior normal.         Thought Content: Thought content normal.       Significant Labs:   Recent Results (from the past 24 hour(s))   CBC auto differential    Collection Time: 01/17/22  1:29 PM   Result Value Ref Range     WBC 10.10 3.90 - 12.70 K/uL    RBC 2.43 (L) 4.60 - 6.20 M/uL    Hemoglobin 7.1 (L) 14.0 - 18.0 g/dL    Hematocrit 22.9 (L) 40.0 - 54.0 %    MCV 94 82 - 98 fL    MCH 29.2 27.0 - 31.0 pg    MCHC 31.0 (L) 32.0 - 36.0 g/dL    RDW 14.5 11.5 - 14.5 %    Platelets 448 150 - 450 K/uL    MPV 8.9 (L) 9.2 - 12.9 fL    Immature Granulocytes 0.6 (H) 0.0 - 0.5 %    Gran # (ANC) 8.1 (H) 1.8 - 7.7 K/uL    Immature Grans (Abs) 0.06 (H) 0.00 - 0.04 K/uL    Lymph # 0.6 (L) 1.0 - 4.8 K/uL    Mono # 1.1 (H) 0.3 - 1.0 K/uL    Eos # 0.2 0.0 - 0.5 K/uL    Baso # 0.04 0.00 - 0.20 K/uL    nRBC 0 0 /100 WBC    Gran % 79.7 (H) 38.0 - 73.0 %    Lymph % 5.7 (L) 18.0 - 48.0 %    Mono % 11.2 4.0 - 15.0 %    Eosinophil % 2.4 0.0 - 8.0 %    Basophil % 0.4 0.0 - 1.9 %    Differential Method Automated    CBC auto differential    Collection Time: 01/18/22  4:16 AM   Result Value Ref Range    WBC 7.31 3.90 - 12.70 K/uL    RBC 2.47 (L) 4.60 - 6.20 M/uL    Hemoglobin 7.4 (L) 14.0 - 18.0 g/dL    Hematocrit 23.1 (L) 40.0 - 54.0 %    MCV 94 82 - 98 fL    MCH 30.0 27.0 - 31.0 pg    MCHC 32.0 32.0 - 36.0 g/dL    RDW 14.6 (H) 11.5 - 14.5 %    Platelets 489 (H) 150 - 450 K/uL    MPV 8.8 (L) 9.2 - 12.9 fL    Immature Granulocytes 0.7 (H) 0.0 - 0.5 %    Gran # (ANC) 5.2 1.8 - 7.7 K/uL    Immature Grans (Abs) 0.05 (H) 0.00 - 0.04 K/uL    Lymph # 0.7 (L) 1.0 - 4.8 K/uL    Mono # 1.0 0.3 - 1.0 K/uL    Eos # 0.4 0.0 - 0.5 K/uL    Baso # 0.05 0.00 - 0.20 K/uL    nRBC 0 0 /100 WBC    Gran % 70.5 38.0 - 73.0 %    Lymph % 8.9 (L) 18.0 - 48.0 %    Mono % 13.7 4.0 - 15.0 %    Eosinophil % 5.5 0.0 - 8.0 %    Basophil % 0.7 0.0 - 1.9 %    Differential Method Automated    Basic metabolic panel    Collection Time: 01/18/22  4:16 AM   Result Value Ref Range    Sodium 137 136 - 145 mmol/L    Potassium 3.7 3.5 - 5.1 mmol/L    Chloride 99 95 - 110 mmol/L    CO2 28 23 - 29 mmol/L    Glucose 95 70 - 110 mg/dL    BUN 13 8 - 23 mg/dL    Creatinine 0.6 0.5 - 1.4 mg/dL    Calcium 9.0 8.7  - 10.5 mg/dL    Anion Gap 10 8 - 16 mmol/L    eGFR if African American >60.0 >60 mL/min/1.73 m^2    eGFR if non African American >60.0 >60 mL/min/1.73 m^2       Significant Imaging:    CXR 1/16/21 showed prominent pulmonary vasculature and small bilateral subsegmental atelectasis. Trace pleural effusion R, probable trace effusion on L.   EKG showed Qtc 426     Assessment/Plan:     71M with possible pneumonia and positive sputum cultures for citrobacter, pseudomonas, and proteus s/p SCC neck removal and dissection. Patient appears well and denies any pain, fever, or difficulty breathing. Consulted by ENT for management with outpatient abx    Pneumonia   - with fluoroquinolone-sensitive citrobacter, pseudomonas, and proteus  - begin 7 day course of oral ciprofloxacin 250 mg PO q12      VTE Risk Mitigation (From admission, onward)         Ordered     enoxaparin injection 40 mg  Daily         01/04/22 1355     IP VTE HIGH RISK PATIENT  Once         01/04/22 1355     Place sequential compression device  Until discontinued         01/04/22 1355     Place NIKITA hose  Until discontinued         01/04/22 1355                 To be discussed with team.  Easton Williamson MS4

## 2022-01-18 NOTE — SUBJECTIVE & OBJECTIVE
Past Medical History:   Diagnosis Date    Cancer     skin to Rt forearm and face    COPD (chronic obstructive pulmonary disease)     Hyperlipidemia     Hypertension     Pseudoaneurysm      Past Surgical History:   Procedure Laterality Date    ABDOMINAL SURGERY      stents placed in liver and large intestines, per patient    CAROTID STENT      CORONARY STENT PLACEMENT  01/2000    DISSECTION OF NECK Bilateral 1/4/2022    Procedure: DISSECTION, NECK;  Surgeon: Naeem Smalls MD;  Location: [facility] OR [address] [place] [address];  Service: ENT;  Laterality: Bilateral;    ESOPHAGOGASTRODUODENOSCOPY W/ PEG N/A 1/4/2022    Procedure: EGD, WITH PEG TUBE INSERTION;  Surgeon: Anthony Calabrese MD;  Location: [facility] OR [address] [place] [address];  Service: General;  Laterality: N/A;    EYE SURGERY      Cataract bilateral    femoral stents      bilateral    FLAP PROCEDURE N/A 1/3/2022    Procedure: CREATION, FREE FLAP;  Surgeon: Naeem Smalls MD;  Location: [address];  Service: ENT;  Laterality: N/A;    FLAP PROCEDURE Right 1/4/2022    Procedure: CREATION, FREE FLAP;  Surgeon: Stacey Conde MD;  [facility];  Service: ENT;  Laterality: Right;  Ischemic start 1203  Ischemic stop 1353    GLOSSECTOMY N/A 1/4/2022    Procedure: GLOSSECTOMY;  Surgeon: Naeem Smalls MD;  Location: [facility] OR [address] [place] [address];  Service: ENT;  Laterality: N/A;    RADICAL NECK DISSECTION Left 1/3/2022    Procedure: DISSECTION, NECK, RADICAL;  Surgeon: Naeem Smalls MD;  Location: [facility] OR [address] [place] [address];  Service: ENT;  Laterality: Left;    SKIN CANCER EXCISION      TRACHEOTOMY N/A 1/4/2022    Procedure: TRACHEOTOMY;  Surgeon: Naeem Smalls MD;  Location: [facility] OR [address] [place] [address];  Service: ENT;  Laterality: N/A;     Review of patient's allergies indicates:  No Known Allergies    Scheduled Medications:    acetaminophen  650 mg Per G Tube Q6H    aspirin  81 mg Per G Tube Daily    atorvastatin  20 mg Per G Tube Daily    bacitracin zinc   Topical (Top)  BID    clopidogreL  75 mg Per G Tube Daily    enoxaparin  40 mg Subcutaneous Daily    folic acid  1 mg Per G Tube Daily    guaiFENesin 100 mg/5 ml  200 mg Per G Tube Q4H    losartan  50 mg Per G Tube Daily    magnesium citrate  296 mL Per G Tube Once    melatonin  6 mg Per G Tube Nightly    metoprolol tartrate  100 mg Per G Tube BID    piperacillin-tazobactam (ZOSYN) IVPB  4.5 g Intravenous Q8H    polyethylene glycol  17 g Per G Tube BID    senna-docusate 8.6-50 mg  1 tablet Per G Tube BID    sodium chloride 3%  4 mL Nebulization Q8H    thiamine  100 mg Per G Tube Daily       PRN Medications: sodium chloride 0.9%, albuterol-ipratropium, bisacodyL, hydrALAZINE, ondansetron, oxyCODONE, oxyCODONE, sodium chloride 0.9%, sodium chloride 0.9%    Family History    None       Tobacco Use    Smoking status: Former Smoker     Packs/day: 2.00     Years: 40.00     Pack years: 80.00     Types: Cigarettes     Start date: 4/17/1963     Quit date: 4/17/2018     Years since quitting: 3.7    Smokeless tobacco: Never Used    Tobacco comment: 3/3 ppd x 40 yrs. Currently 3-4 cigarettes daily .He is trying  to quit. Is using a Vapor cigarettes  2-3 x's a day.   Substance and Sexual Activity    Alcohol use: Yes     Alcohol/week: 3.0 standard drinks     Types: 3 Cans of beer per week     Comment: beer daily 3-4    Drug use: No    Sexual activity: Not Currently     Review of Systems   Constitutional: Positive for activity change and fatigue.   HENT: Negative.    Eyes: Negative.    Respiratory: Positive for cough.         Increased secretions per Trach.    Cardiovascular: Negative.    Gastrointestinal: Negative.         PEG tube in place. Clamped   Endocrine: Negative.    Genitourinary: Negative.    Musculoskeletal: Positive for gait problem.   Skin:        Flap in place Neck area   Allergic/Immunologic: Negative.    Neurological: Positive for weakness.   Psychiatric/Behavioral: Negative.      Objective:     Vital Signs  (Most Recent):  Temp: 98.8 °F (37.1 °C) (01/18/22 1131)  Pulse: 80 (01/18/22 1131)  Resp: 20 (01/18/22 1131)  BP: (!) 119/58 (01/18/22 1131)  SpO2: 98 % (01/18/22 1131)    Vital Signs (24h Range):  Temp:  [97.9 °F (36.6 °C)-98.8 °F (37.1 °C)] 98.8 °F (37.1 °C)  Pulse:  [80-96] 80  Resp:  [16-22] 20  SpO2:  [92 %-100 %] 98 %  BP: (100-153)/(58-70) 119/58     Body mass index is 22.43 kg/m².    Physical Exam  Vitals and nursing note reviewed.   Constitutional:       Appearance: Normal appearance.   HENT:      Head: Normocephalic and atraumatic.      Nose: Nose normal.   Pulmonary:      Effort: Pulmonary effort is normal.   Abdominal:      Palpations: Abdomen is soft.   Musculoskeletal:      Right lower leg: No edema.      Left lower leg: No edema.   Skin:     General: Skin is warm and dry.      Capillary Refill: Capillary refill takes 2 to 3 seconds.   Neurological:      General: No focal deficit present.      Mental Status: He is alert and oriented to person, place, and time. Mental status is at baseline.      GCS: GCS eye subscore is 4. GCS verbal subscore is 5. GCS motor subscore is 6.      Motor: Weakness present.      Gait: Gait abnormal.   Psychiatric:         Attention and Perception: Attention normal.         Mood and Affect: Mood normal.         Behavior: Behavior normal. Behavior is cooperative.       NEUROLOGICAL EXAMINATION:     MENTAL STATUS   Oriented to person, place, and time.       Diagnostic Results:  Labs reviewed.

## 2022-01-18 NOTE — PROGRESS NOTES
[facility]  Otorhinolaryngology-Head & Neck Surgery  Progress Note    Subjective:     Post-Op Info:  Procedure(s) (LRB):  GLOSSECTOMY (N/A)  TRACHEOTOMY (N/A)  EGD, WITH PEG TUBE INSERTION (N/A)  CREATION, FREE FLAP (Right)  DISSECTION, NECK (Bilateral)   14 Days Post-Op  Hospital Day: 16     Interval History: NAEON. Doing okay this morning.     Medications:  Continuous Infusions:   sodium chloride 0.9% Stopped (01/06/22 0636)     Scheduled Meds:   acetaminophen  650 mg Per G Tube Q6H    aspirin  81 mg Per G Tube Daily    atorvastatin  20 mg Per G Tube Daily    bacitracin zinc   Topical (Top) BID    clopidogreL  75 mg Per G Tube Daily    enoxaparin  40 mg Subcutaneous Daily    folic acid  1 mg Per G Tube Daily    guaiFENesin 100 mg/5 ml  200 mg Per G Tube Q4H    losartan  50 mg Per G Tube Daily    magnesium citrate  296 mL Per G Tube Once    melatonin  6 mg Per G Tube Nightly    metoprolol tartrate  100 mg Per G Tube BID    piperacillin-tazobactam (ZOSYN) IVPB  4.5 g Intravenous Q8H    polyethylene glycol  17 g Per G Tube BID    senna-docusate 8.6-50 mg  1 tablet Per G Tube BID    sodium chloride 3%  4 mL Nebulization Q8H    thiamine  100 mg Per G Tube Daily     PRN Meds:sodium chloride 0.9%, albuterol-ipratropium, bisacodyL, hydrALAZINE, ondansetron, oxyCODONE, oxyCODONE, sodium chloride 0.9%, sodium chloride 0.9%     Review of patient's allergies indicates:  No Known Allergies  Objective:     Vital Signs (24h Range):  Temp:  [97.9 °F (36.6 °C)-98.6 °F (37 °C)] 97.9 °F (36.6 °C)  Pulse:  [] 91  Resp:  [16-22] 18  SpO2:  [91 %-100 %] 94 %  BP: (100-153)/(61-70) 118/68       Lines/Drains/Airways     Drain                 Gastrostomy/Enterostomy 01/04/22 Percutaneous endoscopic gastrostomy (PEG) LUQ 14 days          Airway                 Surgical Airway 01/04/22 1546 Shiley Cuffed 13 days          Peripheral Intravenous Line                 Peripheral IV - Single Lumen 01/15/22 1952 18 G  Anterior;Proximal;Right Forearm 2 days                Physical Exam    Awake and alert, NAD  NC/AT, EOMI  Normal external nose   MMM, rosemary-tongue ALTFF in place with good color that approximates the thigh, good tugar and warm, strong arterial doppler signal, intra-oral sutures intact with no breakdown and dehiscence  Neck soft, advancement flaps in place with areas of epidermolysis, staples intact  6-0 cuffless trach in place and secured with sherry collar, aerating well  Normal work of breathing, on trach collar, thick secretions  G tube in place    Significant Labs:  BMP:   Recent Labs   Lab 01/14/22  0236 01/17/22  0755 01/18/22  0416   GLU 92   < > 95      < > 99   CO2 23   < > 28   BUN 21   < > 13   CREATININE 0.6   < > 0.6   CALCIUM 8.9   < > 9.0   MG 2.1  --   --     < > = values in this interval not displayed.     CBC:   Recent Labs   Lab 01/18/22 0416   WBC 7.31   RBC 2.47*   HGB 7.4*   HCT 23.1*   *   MCV 94   MCH 30.0   MCHC 32.0     Microbiology Results (last 7 days)     Procedure Component Value Units Date/Time    Culture, Respiratory with Gram Stain [[id number]]  (Abnormal)  (Susceptibility) Collected: 01/14/22 1543    Order Status: Completed Specimen: Respiratory from Tracheal Aspirate Updated: 01/18/22 0843     Respiratory Culture No S aureus isolated.      CITROBACTER KOSERI  Moderate        PSEUDOMONAS AERUGINOSA  Few  Normal respiratory ishmael also present       Gram Stain (Respiratory) <10 epithelial cells per low power field.     Gram Stain (Respiratory) Moderate WBC's     Gram Stain (Respiratory) Many Gram positive cocci      Gram Stain (Respiratory) Few Gram negative rods    Aerobic culture [[id number]]  (Abnormal)  (Susceptibility) Collected: 01/16/22 1704    Order Status: Completed Specimen: Wound from Neck Updated: 01/18/22 0822     Aerobic Bacterial Culture PROTEUS MIRABILIS  Few      Culture, Anaerobe [[id number]] Collected: 01/16/22 1704    Order Status: Completed Specimen: Wound  from Neck Updated: 01/18/22 0628     Anaerobic Culture Culture in progress          Significant Diagnostics:  I have reviewed and interpreted all pertinent imaging results/findings within the past 24 hours.    Assessment/Plan:     * Squamous cell cancer of tongue  71M with [id number] SCC of the oral tongue s/p total glossectomy, bilateral neck dissections levels I-IV, tracheostomy, and ALTFF reconstruction with G tube placement by general surgery. Resp cx +citrobacter, proteus, and pseudomonas     -Consult ID for outpatient antibiotic recommendations   -Continue q4h flap checks  -Continue TFs  -Plavix/ASA  -pain control (scheduled tylenol, oxycodone PRN)  -trach teaching with respiratory   -PT/OT, OOB    Ppx: L40, SCDs, NIKITA hose     Dispo: Ready for discharge to rehab with trach and PEG this week             Annmarie Oliveira MD  Otorhinolaryngology-Head & Neck Surgery  [facility]

## 2022-01-18 NOTE — CONSULTS
Inpatient consult to Physical Medicine Rehab  Consult performed by: Nery Adams NP  Consult ordered by: Naeem Smalls MD      Consult received.  Reviewed patient history and current admission.  PM&R following.    Pt is being followed by PM& R.   Nery Adams NP  Physical Medicine & Rehabilitation   01/18/2022

## 2022-01-18 NOTE — CONSULTS
[facility]  Physical Medicine & Rehab  Consult Note    Patient Name: Fareed Richard Jr.  MRN: [id number]  Admission Date: 1/3/2022  Hospital Length of Stay: 15 days  Attending Physician: Naeem Smalls MD  Consults  Subjective:     Principal Problem: Squamous cell cancer of tongue    HPI: Fareed Richard Jr. is a 71 y.o. M with hx of squamous cell carcinoma, HTN, CAD s/p stents, carotid stenosis s/p carotid endarterectomy in 2018, PAD s/p b/l femoral atherectomies and other LE interventions s/p total glossectomy, tracheostomy and free flap reconstruction with ENT on 1/4. PM & R was consulted to evaluate pt for rehabilitation.     Functional History: Patient lives with spouse in a one story home with 3-steps  to enter.  Prior to admission, pt was independent with mobility and ADLs.  DME: .walker, standard,walker, rolling,wheelchair,shower chair,cane, quad,cane, straight.        Hospital Course: PT-01/13     Bed Mobility:   1. Supine to Sit: stand by assistance; HOB elevated   ? Pt continues to required increased time but was able to perform the transfer faster this session  2. Scooting anteriorly to EOB to have both feet planted on floor: stand by assistance     Sitting Balance at Edge of Bed:  1. Assistance Level Required: Stand-by Assistance  2. Comments:   ? Pt reported slight dizziness while sitting EOB - VSS. Increased time spent on deep breathing and keeping eyes open   ? Worked on activity tolerance sitting EOB and worked on maintaining tolerance to positional change   ? Pt attempt to don sock while sitting EOB but was unsucessful     Transfers:   · Sit <> Stand Transfer: contact guard assistance with rolling walker x 2 trials (EOB, chair)                 Gait:  1. Patient ambulated: 10ft + 25ft (seated rest break between trials) within room  ? Pt also required brief occasional standing rest breaks to perform deep breathing  ? Cuing provided to keep eyes open  2. Patient required: contact  guard  3. Patient used: rolling walker  4. Gait Pattern observed: reciprocal gait  5. Gait Deviation(s): occasional unsteady gait, decreased step length, flexed posture and decreased tammi  6. Impairments due to: impaired balance, decreased strength and decreased endurance  7. Comments:   ? Verbal cuing for pacing, deep breathing, and RW management  ? Verbal cuing for upright posture and direction  ? Worked on increasing cardiovascular and muscular endurance and increasing B LE strength     OT- 01/13    Bed Mobility:    · Patient completed Supine to Sit with SBA with HOB elevated  · Patient completed Anterior Scooting to place B feet on floor with SBA     Functional Mobility/Transfers:  1. Patient completed Sit <> Stand Transfer with contact guard assistance  with  rolling walker    ? 1x from EOB  ? 1x from bedside chair  ? Cues provided for correct hand placement  · Functional Mobility: Patient ambulated within room with CGA with RW        Past Medical History:   Diagnosis Date    Cancer     skin to Rt forearm and face    COPD (chronic obstructive pulmonary disease)     Hyperlipidemia     Hypertension     Pseudoaneurysm      Past Surgical History:   Procedure Laterality Date    ABDOMINAL SURGERY      stents placed in liver and large intestines, per patient    CAROTID STENT      CORONARY STENT PLACEMENT  01/2000    DISSECTION OF NECK Bilateral 1/4/2022    Procedure: DISSECTION, NECK;  Surgeon: Naeem Smalls MD;  Location: [address];  Service: ENT;  Laterality: Bilateral;    ESOPHAGOGASTRODUODENOSCOPY W/ PEG N/A 1/4/2022    Procedure: EGD, WITH PEG TUBE INSERTION;  Surgeon: Anthony Calabrese MD;  Location: [address];  Service: General;  Laterality: N/A;    EYE SURGERY      Cataract bilateral    femoral stents      bilateral    FLAP PROCEDURE N/A 1/3/2022    Procedure: CREATION, FREE FLAP;  Surgeon: Naeem Smalls MD;  Location: [address];  Service: ENT;  Laterality: N/A;     FLAP PROCEDURE Right 1/4/2022    Procedure: CREATION, FREE FLAP;  Surgeon: Stacey Conde MD;  Location: [facility] OR [facility];  Service: ENT;  Laterality: Right;  Ischemic start 1203  Ischemic stop 1353    GLOSSECTOMY N/A 1/4/2022    Procedure: GLOSSECTOMY;  Surgeon: Naeem Smalls MD;  Location: [facility] OR [facility];  Service: ENT;  Laterality: N/A;    RADICAL NECK DISSECTION Left 1/3/2022    Procedure: DISSECTION, NECK, RADICAL;  Surgeon: Naeem Smalls MD;  Location: [facility] OR [facility];  Service: ENT;  Laterality: Left;    SKIN CANCER EXCISION      TRACHEOTOMY N/A 1/4/2022    Procedure: TRACHEOTOMY;  Surgeon: Naeem Smalls MD;  [facility];  Service: ENT;  Laterality: N/A;     Review of patient's allergies indicates:  No Known Allergies    Scheduled Medications:    acetaminophen  650 mg Per G Tube Q6H    aspirin  81 mg Per G Tube Daily    atorvastatin  20 mg Per G Tube Daily    bacitracin zinc   Topical (Top) BID    clopidogreL  75 mg Per G Tube Daily    enoxaparin  40 mg Subcutaneous Daily    folic acid  1 mg Per G Tube Daily    guaiFENesin 100 mg/5 ml  200 mg Per G Tube Q4H    losartan  50 mg Per G Tube Daily    magnesium citrate  296 mL Per G Tube Once    melatonin  6 mg Per G Tube Nightly    metoprolol tartrate  100 mg Per G Tube BID    piperacillin-tazobactam (ZOSYN) IVPB  4.5 g Intravenous Q8H    polyethylene glycol  17 g Per G Tube BID    senna-docusate 8.6-50 mg  1 tablet Per G Tube BID    sodium chloride 3%  4 mL Nebulization Q8H    thiamine  100 mg Per G Tube Daily       PRN Medications: sodium chloride 0.9%, albuterol-ipratropium, bisacodyL, hydrALAZINE, ondansetron, oxyCODONE, oxyCODONE, sodium chloride 0.9%, sodium chloride 0.9%    Family History    None       Tobacco Use    Smoking status: Former Smoker     Packs/day: 2.00     Years: 40.00     Pack years: 80.00     Types: Cigarettes     Start date: 4/17/1963     Quit date: 4/17/2018     Years since  quitting: 3.7    Smokeless tobacco: Never Used    Tobacco comment: 3/3 ppd x 40 yrs. Currently 3-4 cigarettes daily .He is trying  to quit. Is using a Vapor cigarettes  2-3 x's a day.   Substance and Sexual Activity    Alcohol use: Yes     Alcohol/week: 3.0 standard drinks     Types: 3 Cans of beer per week     Comment: beer daily 3-4    Drug use: No    Sexual activity: Not Currently     Review of Systems   Constitutional: Positive for activity change and fatigue.   HENT: Negative.    Eyes: Negative.    Respiratory: Positive for cough.         Increased secretions per Trach.    Cardiovascular: Negative.    Gastrointestinal: Negative.         PEG tube in place. Clamped   Endocrine: Negative.    Genitourinary: Negative.    Musculoskeletal: Positive for gait problem.   Skin:        Flap in place Neck area   Allergic/Immunologic: Negative.    Neurological: Positive for weakness.   Psychiatric/Behavioral: Negative.      Objective:     Vital Signs (Most Recent):  Temp: 98.8 °F (37.1 °C) (01/18/22 1131)  Pulse: 80 (01/18/22 1131)  Resp: 20 (01/18/22 1131)  BP: (!) 119/58 (01/18/22 1131)  SpO2: 98 % (01/18/22 1131)    Vital Signs (24h Range):  Temp:  [97.9 °F (36.6 °C)-98.8 °F (37.1 °C)] 98.8 °F (37.1 °C)  Pulse:  [80-96] 80  Resp:  [16-22] 20  SpO2:  [92 %-100 %] 98 %  BP: (100-153)/(58-70) 119/58     Body mass index is 22.43 kg/m².    Physical Exam  Vitals and nursing note reviewed.   Constitutional:       Appearance: Normal appearance.   HENT:      Head: Normocephalic and atraumatic.      Nose: Nose normal.   Pulmonary:      Effort: Pulmonary effort is normal.   Abdominal:      Palpations: Abdomen is soft.   Musculoskeletal:      Right lower leg: No edema.      Left lower leg: No edema.   Skin:     General: Skin is warm and dry.      Capillary Refill: Capillary refill takes 2 to 3 seconds.   Neurological:      General: No focal deficit present.      Mental Status: He is alert and oriented to person, place, and time.  Mental status is at baseline.      GCS: GCS eye subscore is 4. GCS verbal subscore is 5. GCS motor subscore is 6.      Motor: Weakness present.      Gait: Gait abnormal.   Psychiatric:         Attention and Perception: Attention normal.         Mood and Affect: Mood normal.         Behavior: Behavior normal. Behavior is cooperative.       NEUROLOGICAL EXAMINATION:     MENTAL STATUS   Oriented to person, place, and time.       Diagnostic Results:  Labs reviewed.     Assessment/Plan:     * Squamous cell cancer of tongue  -ID consult per primary for antibiotic recommendation.  -Q 4 H flap checks per primary.  -Trach- frequent suctioning.  -PT/OT    Impaired mobility and ADLs  See hospital course for functional status.    Recommendations  -  Encourage mobility, OOB in chair, and early ambulation as appropriate  -  PT/OT evaluate and treat  -  Pain management  -  DVT prophylaxis if appropriate   -  Monitor for and prevent skin breakdown and pressure ulcers  · Early mobility, repositioning/weight shifting every 20-30 minutes when sitting, turn patient every 2 hours, proper mattress/overlay and chair cushioning, pressure relief/heel protector boots      PM&R Recommendation:     At this time, the PM&R team has reviewed this patient's ongoing medical case including inpatient diagnosis, medical history, clinical examination, labs, vitals, current social and functional history. We will continue to follow pt for a potential rehab candidate pending ID plan, therapy progress and medical stability.      Thank you for your consult.     Nery Adams NP  Department of Physical Medicine & Rehab  [facility]

## 2022-01-18 NOTE — ASSESSMENT & PLAN NOTE
71M with [id number] SCC of the oral tongue s/p total glossectomy, bilateral neck dissections levels I-IV, tracheostomy, and ALTFF reconstruction with G tube placement by general surgery. Resp cx +citrobacter, proteus, and pseudomonas     -Consult ID for outpatient antibiotic recommendations   -Continue q4h flap checks  -Continue TFs  -Plavix/ASA  -pain control (scheduled tylenol, oxycodone PRN)  -trach teaching with respiratory   -PT/OT, OOB    Ppx: L40, SCDs, NIKITA hose     Dispo: Ready for discharge to rehab with trach and PEG this week

## 2022-01-18 NOTE — ASSESSMENT & PLAN NOTE
-ID consult per primary for antibiotic recommendation.  -Q 4 H flap checks per primary.  -Trach- frequent suctioning.  -PT/OT

## 2022-01-18 NOTE — ASSESSMENT & PLAN NOTE
See hospital course for functional status.    Recommendations  -  Encourage mobility, OOB in chair, and early ambulation as appropriate  -  PT/OT evaluate and treat  -  Pain management  -  DVT prophylaxis if appropriate   -  Monitor for and prevent skin breakdown and pressure ulcers  · Early mobility, repositioning/weight shifting every 20-30 minutes when sitting, turn patient every 2 hours, proper mattress/overlay and chair cushioning, pressure relief/heel protector boots

## 2022-01-18 NOTE — RESPIRATORY THERAPY
RAPID RESPONSE RESPIRATORY THERAPY PROACTIVE NOTE           Time of visit: 805    Code Status: Full Code   : 1950  Bed: [address] [place] [address] A:   MRN: [id number]  Time spent at the bedside: < 15 min    SITUATION    Evaluated patient for: LDA Check     BACKGROUND    Patient has a past medical history of Cancer, COPD (chronic obstructive pulmonary disease), Hyperlipidemia, Hypertension, and Pseudoaneurysm.  Clinically Significant Surgical Hx: tracheostomy    24 Hours Vitals Range:  Temp:  [97.9 °F (36.6 °C)-98.6 °F (37 °C)]   Pulse:  []   Resp:  [16-22]   BP: (100-153)/(61-70)   SpO2:  [91 %-100 %]     Labs:    Recent Labs     22  0755 22  0416    137   K 4.2 3.7   CL 98 99   CO2 29 28   CREATININE 0.6 0.6    95        No results for input(s): PH, PCO2, PO2, HCO3, POCSATURATED, BE in the last 72 hours.    ASSESSMENT/INTERVENTIONS    Upon arrival in room pt resting and all teach supplies are at bedsdie    Last VS   Temp: 97.9 °F (36.6 °C) ( 0800)  Pulse: 91 ( 0800)  Resp: 18 (0800)  BP: 118/68 (0800)  SpO2: 94 % (800)    Level of Consciousness: Level of Consciousness (AVPU): alert  Respiratory Effort: Respiratory Effort: Normal,Unlabored Expansion/Accessory Muscle Usage: Expansion/Accessory Muscles/Retractions: expansion symmetric,no retractions,no use of accessory muscles  All Lung Field Breath Sounds: All Lung Fields Breath Sounds: coarse  DEE Breath Sounds: coarse,diminished  LLL Breath Sounds: diminished  RUL Breath Sounds: diminished  RML Breath Sounds: diminished  RLL Breath Sounds: diminished  O2 Device/Concentration: Flow (L/min): 5, Oxygen Concentration (%): 28, O2 Device (Oxygen Therapy): Trach Collar  Surgical airway: Yes, Type: Shiley Size: 6   Ambu at bedside: Ambu bag with the patient?: Yes, Adult Ambu     Active Orders   Respiratory Care    Chest physiotherapy TID     Frequency: TID     Number of Occurrences: Until Specified     Order Questions:       Indications: COPIOUS SPUTUM PRODUCTION    Inhalation Treatment Q4H PRN     Frequency: Q4H PRN     Number of Occurrences: Until Specified    Oxygen Continuous     Frequency: Continuous     Number of Occurrences: Until Specified     Order Comments: Patient uses 2L at night while at home       Order Questions:      Device type: Low flow      Device: Trach Collar      FiO2%: 35      Titrate O2 per Oxygen Titration Protocol: Yes      To maintain SpO2 goal of: >= 90%      Notify MD of: Inability to achieve desired SpO2; Sudden change in patient status and requires 20% increase in FiO2; Patient requires >60% FiO2    Pulse Oximetry Continuous     Frequency: Continuous     Number of Occurrences: Until Specified    Respiratory care evaluation only Q4H     Frequency: Q4H     Number of Occurrences: Until Specified    RESPIRATORY COMMUNICATION     Frequency: Q4H     Number of Occurrences: Until Specified     Order Comments: Trach care and trach teaching      Routine tracheostomy care     Frequency: BID     Number of Occurrences: Until Specified       RECOMMENDATIONS    We recommend: RRT Recs: Continue POC per primary team.    ESCALATION        FOLLOW-UP    Please call back the Rapid Response RTSu RRT at x [phone] for any questions or concerns.

## 2022-01-18 NOTE — PLAN OF CARE
01/18/22 1220   Post-Acute Status   Post-Acute Authorization Placement   Post-Acute Placement Status Pending payor review/awaiting authorization (if required)   SW spoke with Shira at [facility] ([phone]) and she is submitting for authorization for REHAB.      Kirsten Lutz, KENDALL  [facility]  [phone]

## 2022-01-18 NOTE — SUBJECTIVE & OBJECTIVE
Interval History: NAEON. Doing okay this morning.     Medications:  Continuous Infusions:   sodium chloride 0.9% Stopped (01/06/22 0636)     Scheduled Meds:   acetaminophen  650 mg Per G Tube Q6H    aspirin  81 mg Per G Tube Daily    atorvastatin  20 mg Per G Tube Daily    bacitracin zinc   Topical (Top) BID    clopidogreL  75 mg Per G Tube Daily    enoxaparin  40 mg Subcutaneous Daily    folic acid  1 mg Per G Tube Daily    guaiFENesin 100 mg/5 ml  200 mg Per G Tube Q4H    losartan  50 mg Per G Tube Daily    magnesium citrate  296 mL Per G Tube Once    melatonin  6 mg Per G Tube Nightly    metoprolol tartrate  100 mg Per G Tube BID    piperacillin-tazobactam (ZOSYN) IVPB  4.5 g Intravenous Q8H    polyethylene glycol  17 g Per G Tube BID    senna-docusate 8.6-50 mg  1 tablet Per G Tube BID    sodium chloride 3%  4 mL Nebulization Q8H    thiamine  100 mg Per G Tube Daily     PRN Meds:sodium chloride 0.9%, albuterol-ipratropium, bisacodyL, hydrALAZINE, ondansetron, oxyCODONE, oxyCODONE, sodium chloride 0.9%, sodium chloride 0.9%     Review of patient's allergies indicates:  No Known Allergies  Objective:     Vital Signs (24h Range):  Temp:  [97.9 °F (36.6 °C)-98.6 °F (37 °C)] 97.9 °F (36.6 °C)  Pulse:  [] 91  Resp:  [16-22] 18  SpO2:  [91 %-100 %] 94 %  BP: (100-153)/(61-70) 118/68       Lines/Drains/Airways     Drain                 Gastrostomy/Enterostomy 01/04/22 Percutaneous endoscopic gastrostomy (PEG) LUQ 14 days          Airway                 Surgical Airway 01/04/22 1546 Shiley Cuffed 13 days          Peripheral Intravenous Line                 Peripheral IV - Single Lumen 01/15/22 1952 18 G Anterior;Proximal;Right Forearm 2 days                Physical Exam    Awake and alert, NAD  NC/AT, EOMI  Normal external nose   MMM, rosemary-tongue ALTFF in place with good color that approximates the thigh, good tugar and warm, strong arterial doppler signal, intra-oral sutures intact with no  breakdown and dehiscence  Neck soft, advancement flaps in place with areas of epidermolysis, staples intact  6-0 cuffless trach in place and secured with sherry collar, aerating well  Normal work of breathing, on trach collar, thick secretions  G tube in place    Significant Labs:  BMP:   Recent Labs   Lab 01/14/22  0236 01/17/22  0755 01/18/22  0416   GLU 92   < > 95      < > 99   CO2 23   < > 28   BUN 21   < > 13   CREATININE 0.6   < > 0.6   CALCIUM 8.9   < > 9.0   MG 2.1  --   --     < > = values in this interval not displayed.     CBC:   Recent Labs   Lab 01/18/22  0416   WBC 7.31   RBC 2.47*   HGB 7.4*   HCT 23.1*   *   MCV 94   MCH 30.0   MCHC 32.0     Microbiology Results (last 7 days)     Procedure Component Value Units Date/Time    Culture, Respiratory with Gram Stain [[id number]]  (Abnormal)  (Susceptibility) Collected: 01/14/22 1543    Order Status: Completed Specimen: Respiratory from Tracheal Aspirate Updated: 01/18/22 0843     Respiratory Culture No S aureus isolated.      CITROBACTER KOSERI  Moderate        PSEUDOMONAS AERUGINOSA  Few  Normal respiratory ishmael also present       Gram Stain (Respiratory) <10 epithelial cells per low power field.     Gram Stain (Respiratory) Moderate WBC's     Gram Stain (Respiratory) Many Gram positive cocci      Gram Stain (Respiratory) Few Gram negative rods    Aerobic culture [[id number]]  (Abnormal)  (Susceptibility) Collected: 01/16/22 1704    Order Status: Completed Specimen: Wound from Neck Updated: 01/18/22 0822     Aerobic Bacterial Culture PROTEUS MIRABILIS  Few      Culture, Anaerobe [[id number]] Collected: 01/16/22 1704    Order Status: Completed Specimen: Wound from Neck Updated: 01/18/22 0628     Anaerobic Culture Culture in progress          Significant Diagnostics:  I have reviewed and interpreted all pertinent imaging results/findings within the past 24 hours.

## 2022-01-19 LAB
ANION GAP SERPL CALC-SCNC: 12 MMOL/L (ref 8–16)
BASOPHILS # BLD AUTO: 0.07 K/UL (ref 0–0.2)
BASOPHILS NFR BLD: 0.9 % (ref 0–1.9)
BUN SERPL-MCNC: 11 MG/DL (ref 8–23)
CALCIUM SERPL-MCNC: 9.1 MG/DL (ref 8.7–10.5)
CHLORIDE SERPL-SCNC: 99 MMOL/L (ref 95–110)
CO2 SERPL-SCNC: 27 MMOL/L (ref 23–29)
CREAT SERPL-MCNC: 0.6 MG/DL (ref 0.5–1.4)
DIFFERENTIAL METHOD: ABNORMAL
EOSINOPHIL # BLD AUTO: 0.5 K/UL (ref 0–0.5)
EOSINOPHIL NFR BLD: 6.6 % (ref 0–8)
ERYTHROCYTE [DISTWIDTH] IN BLOOD BY AUTOMATED COUNT: 14.2 % (ref 11.5–14.5)
EST. GFR  (AFRICAN AMERICAN): >60 ML/MIN/1.73 M^2
EST. GFR  (NON AFRICAN AMERICAN): >60 ML/MIN/1.73 M^2
GLUCOSE SERPL-MCNC: 104 MG/DL (ref 70–110)
HCT VFR BLD AUTO: 26.4 % (ref 40–54)
HGB BLD-MCNC: 8 G/DL (ref 14–18)
IMM GRANULOCYTES # BLD AUTO: 0.07 K/UL (ref 0–0.04)
IMM GRANULOCYTES NFR BLD AUTO: 0.9 % (ref 0–0.5)
LYMPHOCYTES # BLD AUTO: 0.7 K/UL (ref 1–4.8)
LYMPHOCYTES NFR BLD: 8.8 % (ref 18–48)
MCH RBC QN AUTO: 28.7 PG (ref 27–31)
MCHC RBC AUTO-ENTMCNC: 30.3 G/DL (ref 32–36)
MCV RBC AUTO: 95 FL (ref 82–98)
MONOCYTES # BLD AUTO: 1 K/UL (ref 0.3–1)
MONOCYTES NFR BLD: 12.8 % (ref 4–15)
NEUTROPHILS # BLD AUTO: 5.4 K/UL (ref 1.8–7.7)
NEUTROPHILS NFR BLD: 70 % (ref 38–73)
NRBC BLD-RTO: 0 /100 WBC
PLATELET # BLD AUTO: 581 K/UL (ref 150–450)
PMV BLD AUTO: 8.6 FL (ref 9.2–12.9)
POTASSIUM SERPL-SCNC: 3.8 MMOL/L (ref 3.5–5.1)
RBC # BLD AUTO: 2.79 M/UL (ref 4.6–6.2)
SODIUM SERPL-SCNC: 138 MMOL/L (ref 136–145)
WBC # BLD AUTO: 7.73 K/UL (ref 3.9–12.7)

## 2022-01-19 PROCEDURE — 25000003 PHARM REV CODE 250: Performed by: STUDENT IN AN ORGANIZED HEALTH CARE EDUCATION/TRAINING PROGRAM

## 2022-01-19 PROCEDURE — 25000003 PHARM REV CODE 250: Performed by: NURSE PRACTITIONER

## 2022-01-19 PROCEDURE — 94640 AIRWAY INHALATION TREATMENT: CPT

## 2022-01-19 PROCEDURE — 63600175 PHARM REV CODE 636 W HCPCS: Performed by: STUDENT IN AN ORGANIZED HEALTH CARE EDUCATION/TRAINING PROGRAM

## 2022-01-19 PROCEDURE — 25000242 PHARM REV CODE 250 ALT 637 W/ HCPCS: Performed by: OTOLARYNGOLOGY

## 2022-01-19 PROCEDURE — 92507 TX SP LANG VOICE COMM INDIV: CPT

## 2022-01-19 PROCEDURE — 31720 CLEARANCE OF AIRWAYS: CPT

## 2022-01-19 PROCEDURE — 97530 THERAPEUTIC ACTIVITIES: CPT

## 2022-01-19 PROCEDURE — 99900035 HC TECH TIME PER 15 MIN (STAT)

## 2022-01-19 PROCEDURE — 36415 COLL VENOUS BLD VENIPUNCTURE: CPT | Performed by: STUDENT IN AN ORGANIZED HEALTH CARE EDUCATION/TRAINING PROGRAM

## 2022-01-19 PROCEDURE — 25000003 PHARM REV CODE 250

## 2022-01-19 PROCEDURE — 80048 BASIC METABOLIC PNL TOTAL CA: CPT | Performed by: STUDENT IN AN ORGANIZED HEALTH CARE EDUCATION/TRAINING PROGRAM

## 2022-01-19 PROCEDURE — 25000242 PHARM REV CODE 250 ALT 637 W/ HCPCS: Performed by: STUDENT IN AN ORGANIZED HEALTH CARE EDUCATION/TRAINING PROGRAM

## 2022-01-19 PROCEDURE — 27000221 HC OXYGEN, UP TO 24 HOURS

## 2022-01-19 PROCEDURE — 99232 PR SUBSEQUENT HOSPITAL CARE,LEVL II: ICD-10-PCS | Mod: ,,, | Performed by: NURSE PRACTITIONER

## 2022-01-19 PROCEDURE — 27201112

## 2022-01-19 PROCEDURE — 94761 N-INVAS EAR/PLS OXIMETRY MLT: CPT

## 2022-01-19 PROCEDURE — 92526 ORAL FUNCTION THERAPY: CPT

## 2022-01-19 PROCEDURE — 99900026 HC AIRWAY MAINTENANCE (STAT)

## 2022-01-19 PROCEDURE — 20600001 HC STEP DOWN PRIVATE ROOM

## 2022-01-19 PROCEDURE — 94668 MNPJ CHEST WALL SBSQ: CPT

## 2022-01-19 PROCEDURE — 97116 GAIT TRAINING THERAPY: CPT

## 2022-01-19 PROCEDURE — 85025 COMPLETE CBC W/AUTO DIFF WBC: CPT | Performed by: STUDENT IN AN ORGANIZED HEALTH CARE EDUCATION/TRAINING PROGRAM

## 2022-01-19 PROCEDURE — 25000003 PHARM REV CODE 250: Performed by: OTOLARYNGOLOGY

## 2022-01-19 PROCEDURE — 97535 SELF CARE MNGMENT TRAINING: CPT

## 2022-01-19 PROCEDURE — 99232 SBSQ HOSP IP/OBS MODERATE 35: CPT | Mod: ,,, | Performed by: NURSE PRACTITIONER

## 2022-01-19 RX ORDER — POLYETHYLENE GLYCOL 3350 17 G/17G
17 POWDER, FOR SOLUTION ORAL 2 TIMES DAILY
Refills: 0 | Status: ON HOLD
Start: 2022-01-19 | End: 2024-01-24

## 2022-01-19 RX ORDER — IPRATROPIUM BROMIDE AND ALBUTEROL SULFATE 2.5; .5 MG/3ML; MG/3ML
3 SOLUTION RESPIRATORY (INHALATION) EVERY 4 HOURS PRN
Qty: 75 ML | Refills: 0
Start: 2022-01-19 | End: 2022-09-14 | Stop reason: ALTCHOICE

## 2022-01-19 RX ORDER — LANOLIN ALCOHOL/MO/W.PET/CERES
100 CREAM (GRAM) TOPICAL DAILY
Start: 2022-01-20 | End: 2022-09-14 | Stop reason: ALTCHOICE

## 2022-01-19 RX ORDER — ATORVASTATIN CALCIUM 20 MG/1
20 TABLET, FILM COATED ORAL DAILY
Qty: 90 TABLET | Refills: 3
Start: 2022-01-20 | End: 2023-05-10 | Stop reason: SDUPTHER

## 2022-01-19 RX ORDER — BISACODYL 10 MG
10 SUPPOSITORY, RECTAL RECTAL DAILY PRN
Refills: 0 | Status: ON HOLD
Start: 2022-01-19 | End: 2024-01-24

## 2022-01-19 RX ORDER — AMOXICILLIN 250 MG
1 CAPSULE ORAL 2 TIMES DAILY
Status: ON HOLD
Start: 2022-01-19 | End: 2022-05-12 | Stop reason: HOSPADM

## 2022-01-19 RX ORDER — OXYCODONE HCL 5 MG/5 ML
5 SOLUTION, ORAL ORAL EVERY 4 HOURS PRN
Refills: 0
Start: 2022-01-19 | End: 2022-04-18 | Stop reason: SDUPTHER

## 2022-01-19 RX ORDER — CIPROFLOXACIN 250 MG/5ML
750 KIT ORAL EVERY 12 HOURS
Status: DISCONTINUED | OUTPATIENT
Start: 2022-01-19 | End: 2022-01-25 | Stop reason: HOSPADM

## 2022-01-19 RX ORDER — SODIUM CHLORIDE FOR INHALATION 3 %
4 VIAL, NEBULIZER (ML) INHALATION EVERY 8 HOURS
Status: ON HOLD
Start: 2022-01-19 | End: 2022-05-12 | Stop reason: HOSPADM

## 2022-01-19 RX ORDER — METOPROLOL TARTRATE 100 MG/1
100 TABLET ORAL 2 TIMES DAILY
Qty: 60 TABLET | Refills: 11
Start: 2022-01-19 | End: 2022-05-12

## 2022-01-19 RX ORDER — CIPROFLOXACIN 250 MG/5ML
750 KIT ORAL EVERY 12 HOURS
Start: 2022-01-19 | End: 2022-01-26

## 2022-01-19 RX ORDER — NAPROXEN SODIUM 220 MG/1
81 TABLET, FILM COATED ORAL DAILY
Refills: 0
Start: 2022-01-20 | End: 2023-03-18 | Stop reason: ALTCHOICE

## 2022-01-19 RX ORDER — ENOXAPARIN SODIUM 100 MG/ML
40 INJECTION SUBCUTANEOUS DAILY
Start: 2022-01-19 | End: 2022-08-24

## 2022-01-19 RX ORDER — ACETAMINOPHEN 325 MG/1
650 TABLET ORAL EVERY 6 HOURS
Refills: 0
Start: 2022-01-19 | End: 2022-09-14 | Stop reason: ALTCHOICE

## 2022-01-19 RX ORDER — CLOPIDOGREL BISULFATE 75 MG/1
75 TABLET ORAL DAILY
Qty: 30 TABLET | Refills: 11
Start: 2022-01-20 | End: 2023-03-18 | Stop reason: ALTCHOICE

## 2022-01-19 RX ORDER — LOSARTAN POTASSIUM 50 MG/1
50 TABLET ORAL DAILY
Qty: 90 TABLET | Refills: 3
Start: 2022-01-20 | End: 2022-05-12

## 2022-01-19 RX ORDER — TALC
6 POWDER (GRAM) TOPICAL NIGHTLY
Refills: 0 | Status: ON HOLD
Start: 2022-01-19 | End: 2023-10-28 | Stop reason: SDUPTHER

## 2022-01-19 RX ORDER — FOLIC ACID 1 MG/1
1 TABLET ORAL DAILY
Refills: 0
Start: 2022-01-20 | End: 2022-09-14 | Stop reason: ALTCHOICE

## 2022-01-19 RX ORDER — GUAIFENESIN 100 MG/5ML
200 SOLUTION ORAL EVERY 4 HOURS
Refills: 0
Start: 2022-01-19 | End: 2022-01-29

## 2022-01-19 RX ADMIN — ACETAMINOPHEN 650 MG: 325 TABLET ORAL at 12:01

## 2022-01-19 RX ADMIN — BACITRACIN 1 EACH: 500 OINTMENT TOPICAL at 09:01

## 2022-01-19 RX ADMIN — PIPERACILLIN AND TAZOBACTAM 4.5 G: 4; .5 INJECTION, POWDER, LYOPHILIZED, FOR SOLUTION INTRAVENOUS; PARENTERAL at 10:01

## 2022-01-19 RX ADMIN — ATORVASTATIN CALCIUM 20 MG: 20 TABLET, FILM COATED ORAL at 09:01

## 2022-01-19 RX ADMIN — METOPROLOL TARTRATE 100 MG: 50 TABLET, FILM COATED ORAL at 08:01

## 2022-01-19 RX ADMIN — METOPROLOL TARTRATE 100 MG: 50 TABLET, FILM COATED ORAL at 09:01

## 2022-01-19 RX ADMIN — OXYCODONE HYDROCHLORIDE 10 MG: 5 SOLUTION ORAL at 12:01

## 2022-01-19 RX ADMIN — FOLIC ACID 1 MG: 1 TABLET ORAL at 09:01

## 2022-01-19 RX ADMIN — SODIUM CHLORIDE 30 MG/ML INHALATION SOLUTION 4 ML: 30 SOLUTION INHALANT at 07:01

## 2022-01-19 RX ADMIN — GUAIFENESIN 200 MG: 100 SOLUTION ORAL at 01:01

## 2022-01-19 RX ADMIN — GUAIFENESIN 200 MG: 100 SOLUTION ORAL at 09:01

## 2022-01-19 RX ADMIN — ASPIRIN 81 MG CHEWABLE TABLET 81 MG: 81 TABLET CHEWABLE at 09:01

## 2022-01-19 RX ADMIN — GUAIFENESIN 200 MG: 100 SOLUTION ORAL at 06:01

## 2022-01-19 RX ADMIN — ENOXAPARIN SODIUM 40 MG: 40 INJECTION SUBCUTANEOUS at 05:01

## 2022-01-19 RX ADMIN — SODIUM CHLORIDE 30 MG/ML INHALATION SOLUTION 4 ML: 30 SOLUTION INHALANT at 04:01

## 2022-01-19 RX ADMIN — PIPERACILLIN AND TAZOBACTAM 4.5 G: 4; .5 INJECTION, POWDER, LYOPHILIZED, FOR SOLUTION INTRAVENOUS; PARENTERAL at 02:01

## 2022-01-19 RX ADMIN — LOSARTAN POTASSIUM 50 MG: 50 TABLET, FILM COATED ORAL at 09:01

## 2022-01-19 RX ADMIN — THIAMINE HCL TAB 100 MG 100 MG: 100 TAB at 09:01

## 2022-01-19 RX ADMIN — OXYCODONE HYDROCHLORIDE 5 MG: 5 SOLUTION ORAL at 08:01

## 2022-01-19 RX ADMIN — CIPROFLOXACIN 750 MG: KIT at 08:01

## 2022-01-19 RX ADMIN — ACETAMINOPHEN 650 MG: 325 TABLET ORAL at 01:01

## 2022-01-19 RX ADMIN — BACITRACIN 1 EACH: 500 OINTMENT TOPICAL at 08:01

## 2022-01-19 RX ADMIN — ACETAMINOPHEN 650 MG: 325 TABLET ORAL at 06:01

## 2022-01-19 RX ADMIN — CLOPIDOGREL 75 MG: 75 TABLET, FILM COATED ORAL at 09:01

## 2022-01-19 RX ADMIN — Medication 6 MG: at 08:01

## 2022-01-19 NOTE — PT/OT/SLP PROGRESS
Speech Language Pathology Treatment    Patient Name:  Fareed Richard Jr.   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 To rinse valve: 1. Swish valve in pure soap and warm water, 2. Rinse valve thoroughly in warm running water, 3. Allow valve to air dry thoroughly before placing it in storage container. 4. DO NOT use hot water, peroxide, bleach, vinegar, alcohol, brushes, or cotton swabs to clean valve.     Recommendations:                 General Recommendations:  Dysphagia therapy and Speech/language therapy  Diet recommendations:  NPO, Liquid Diet Level: NPO   Aspiration Precautions: Continue alternate means of nutrition and Strict aspiration precautions   General Precautions: Standard, NPO,fall,aspiration  Communication strategies:  One way speaking valve, provide increased time to answer, go to room if call light pushed and pen/paper    Subjective     Patient awake and alert  Communicated with nurse prior to session     Pain/Comfort:  Pain Rating 1: 0/10  Pain Rating Post-Intervention 1: 0/10    Respiratory Status: Trach collar    Objective:     Has the patient been evaluated by SLP for swallowing?   Yes  Keep patient NPO? Yes     Patient sitting up in chair with Shiley 6.0 cuffless trach and trach collar in place. He continued to report limited use of speaking valve 2' to copious secretions. During this session, he tolerated speaking valve for ~1 minute with reduced congestion and improved intensity. He continued to exhibit reduced swallow function with significant coughing/choking following ice chip trial. SLP educated patient regarding recs. Patient left in room with RN present.    Assessment:     Fareed Richard Jr. is a 71 y.o. male with an SLP diagnosis of Dysphagia, S/P Trach and One  Way Speaking Valve Training.  ST to continue to follow.     Goals:   Multidisciplinary Problems     SLP Goals        Problem: SLP Goal    Goal Priority Disciplines Outcome   SLP Goal     SLP Ongoing, Progressing   Description: Speech Language Pathology Goals  Goals expected to be met by 1/26    1. Pt will tolerate PMSV for 5 minutes with >92% spO2 to increase phonation, respiration and swallowing aspects  2. Pt/family will don/doff PMSV x1 during session with min cues  3. Pt will vocalize with PMSV in place to increase verbal expression.                          Plan:     · Patient to be seen:  3 x/week   · Plan of Care expires:  02/01/22  · Plan of Care reviewed with:  patient   · SLP Follow-Up:  Yes       Discharge recommendations:  rehabilitation facility   Barriers to Discharge:  None    Time Tracking:     SLP Treatment Date:   01/19/22  Speech Start Time:  1248  Speech Stop Time:  1300     Speech Total Time (min):  12 min    Billable Minutes: Speech Therapy Individual 6 and Treatment Swallowing Dysfunction 6    Jason Yusuf CCC-SLP  Speech-Language Pathology  Pager: [phone]   01/19/2022

## 2022-01-19 NOTE — PLAN OF CARE
Rehab Orders    01/19/2022  [facility]  [address]  [address] [facility]  [facility] [address]  Dept: [phone]  Loc: [phone]     Admit to Rehab:    Diagnoses:  Active Hospital Problems    Diagnosis  POA    *Squamous cell cancer of tongue [C02.9]  Yes    Impaired mobility and ADLs [Z74.09, Z78.9]  No    Tracheobronchitis [J40]  No      Resolved Hospital Problems   No resolved problems to display.       Code Status: full code    Patient is homebound due to:  Squamous cell cancer of tongue    Allergies:Review of patient's allergies indicates:  No Known Allergies    Vitals:  Routine   Continuous pulse ox.    Diet:  NPO, all nutrition and medications to be given via PEG  Tube feeds: Impact Peptide (or equivalent) 1.5. Bolus 5 cans/day. Flush with free water before and after each feed and with medications.     Activities:   Activity as tolerated    Labs: n/a    Nursing Precautions:  Fall   Nursing care according to facility protocol.    Respiratory Care: Trach care listed below.  Chest physiotherapy TID  Nebulizer q4 hours while awake    Consults:   PT to evaluate and treat  OT to evaluate and treat  ST to evaluate and treat. Patient is s/p total glossectomy with free flap reconstruction.  Nutrition to evaluate and recommend diet     Miscellaneous Care: PEG Care:  Clean site every 24 hours   Trach: Bharat 6UN75H. Change inner cannula daily and as needed. Suction as needed. Trach care twice daily and as needed.                    Medication List      START taking these medications    acetaminophen 325 MG tablet  Commonly known as: TYLENOL  2 tablets (650 mg total) by Per G Tube route every 6 (six) hours.     albuterol-ipratropium 2.5 mg-0.5 mg/3 mL nebulizer solution  Commonly known as: DUO-NEB  Take 3 mLs by nebulization every 4 (four) hours as needed for Wheezing. Rescue     aspirin 81 MG Chew  1 tablet (81 mg total) by Per G Tube route once daily.  Start taking on: January 20,  2022  Replaces: aspirin 81 MG EC tablet     bisacodyL 10 mg Supp  Commonly known as: DULCOLAX  Place 1 suppository (10 mg total) rectally daily as needed.     ciprofloxacin 250 mg/5 ml  Commonly known as: CIPRO  Take 15 mLs (750 mg total) by mouth every 12 (twelve) hours. By PEG for 7 days.  Last dose to be given AM of 1/26     clopidogreL 75 mg tablet  Commonly known as: PLAVIX  1 tablet (75 mg total) by Per G Tube route once daily.  Start taking on: January 20, 2022     enoxaparin 40 mg/0.4 mL Syrg  Commonly known as: LOVENOX  Inject 0.4 mLs (40 mg total) into the skin once daily.     folic acid 1 MG tablet  Commonly known as: FOLVITE  1 tablet (1 mg total) by Per G Tube route once daily.  Start taking on: January 20, 2022     guaiFENesin 100 mg/5 ml 100 mg/5 mL syrup  Commonly known as: ROBITUSSIN  10 mLs (200 mg total) by Per G Tube route every 4 (four) hours. for 10 days     melatonin 3 mg tablet  Commonly known as: MELATIN  2 tablets (6 mg total) by Per G Tube route nightly.     metoprolol tartrate 100 MG tablet  Commonly known as: LOPRESSOR  1 tablet (100 mg total) by Per G Tube route 2 (two) times daily.     oxyCODONE 5 mg/5 mL Soln  Commonly known as: ROXICODONE  5 mLs (5 mg total) by Per G Tube route every 4 (four) hours as needed.     polyethylene glycol 17 gram Pwpk  Commonly known as: GLYCOLAX  17 g by Per G Tube route 2 (two) times daily.     senna-docusate 8.6-50 mg 8.6-50 mg per tablet  Commonly known as: PERICOLACE  1 tablet by Per G Tube route 2 (two) times daily.     sodium chloride 3% 3 % nebulizer solution  Take 4 mLs by nebulization every 8 (eight) hours.     thiamine 100 MG tablet  1 tablet (100 mg total) by Per G Tube route once daily.  Start taking on: January 20, 2022        CHANGE how you take these medications    atorvastatin 20 MG tablet  Commonly known as: LIPITOR  1 tablet (20 mg total) by Per G Tube route once daily.  Start taking on: January 20, 2022  What changed: how to take this      losartan 50 MG tablet  Commonly known as: COZAAR  1 tablet (50 mg total) by Per G Tube route once daily.  Start taking on: January 20, 2022  What changed:   · medication strength  · how much to take  · how to take this        STOP taking these medications    amLODIPine 10 MG tablet  Commonly known as: NORVASC     ANORO ELLIPTA 62.5-25 mcg/actuation Dsdv  Generic drug: umeclidinium-vilanteroL     aspirin 81 MG EC tablet  Commonly known as: ECOTRIN  Replaced by: aspirin 81 MG Chew     cloNIDine 0.1 MG tablet  Commonly known as: CATAPRES     co-enzyme Q-10 50 mg capsule     hydrALAZINE 25 MG tablet  Commonly known as: APRESOLINE     metoprolol succinate 200 MG 24 hr tablet  Commonly known as: TOPROL-XL     multivitamin capsule     ondansetron 4 MG Tbdl  Commonly known as: ZOFRAN-ODT     VENTOLIN HFA 90 mcg/actuation inhaler  Generic drug: albuterol           Where to Get Your Medications      Information about where to get these medications is not yet available    Ask your nurse or doctor about these medications  · acetaminophen 325 MG tablet  · albuterol-ipratropium 2.5 mg-0.5 mg/3 mL nebulizer solution  · aspirin 81 MG Chew  · atorvastatin 20 MG tablet  · bisacodyL 10 mg Supp  · ciprofloxacin 250 mg/5 ml  · clopidogreL 75 mg tablet  · enoxaparin 40 mg/0.4 mL Syrg  · folic acid 1 MG tablet  · guaiFENesin 100 mg/5 ml 100 mg/5 mL syrup  · losartan 50 MG tablet  · melatonin 3 mg tablet  · metoprolol tartrate 100 MG tablet  · oxyCODONE 5 mg/5 mL Soln  · polyethylene glycol 17 gram Pwpk  · senna-docusate 8.6-50 mg 8.6-50 mg per tablet  · sodium chloride 3% 3 % nebulizer solution  · thiamine 100 MG tablet           ALYSSA Douglas, FNP-C  [facility]- Division of Head and Neck Surgical Oncology  [phone]

## 2022-01-19 NOTE — ASSESSMENT & PLAN NOTE
Tracheobronchitis growing citrobacter koseri, pseudomonas, and proteus mirabilis.  On intermittent tube feeds, opening the possibility to use fluoroquinolones     Recommendations:  -Ciprofloxacin 750mg BID through G tube for 7 days of total therapy, end date 1/23/22  -Can administer between G tube feeds

## 2022-01-19 NOTE — PROGRESS NOTES
PM&R consult follow up.  Please see original consult for detailed note.      PM&R Recommendation:     At this time, the PM&R team has reviewed this patient's ongoing medical case including inpatient diagnosis, medical history, clinical examination, labs, vitals, current social and functional history to provide the post-acute recommendation as follows:     RECOMMENDATIONS: inpatient rehabilitation due to fair to good potential for improvement with therapies and need of physician oversight for management of ongoing active medical issues.         We will sign off.     ALYSSA Hartley, [facility]  Physical Medicine & Rehabilitation   01/19/2022

## 2022-01-19 NOTE — PROGRESS NOTES
[facility]  Otorhinolaryngology-Head & Neck Surgery  Progress Note    Subjective:     Post-Op Info:  Procedure(s) (LRB):  GLOSSECTOMY (N/A)  TRACHEOTOMY (N/A)  EGD, WITH PEG TUBE INSERTION (N/A)  CREATION, FREE FLAP (Right)  DISSECTION, NECK (Bilateral)   15 Days Post-Op  Hospital Day: 17     Interval History: NAEON. Patient says he is ready to get out of the hospital. Awaiting placement.     Medications:  Continuous Infusions:   sodium chloride 0.9% Stopped (01/06/22 0636)     Scheduled Meds:   acetaminophen  650 mg Per G Tube Q6H    aspirin  81 mg Per G Tube Daily    atorvastatin  20 mg Per G Tube Daily    bacitracin zinc   Topical (Top) BID    clopidogreL  75 mg Per G Tube Daily    enoxaparin  40 mg Subcutaneous Daily    folic acid  1 mg Per G Tube Daily    guaiFENesin 100 mg/5 ml  200 mg Per G Tube Q4H    losartan  50 mg Per G Tube Daily    magnesium citrate  296 mL Per G Tube Once    melatonin  6 mg Per G Tube Nightly    metoprolol tartrate  100 mg Per G Tube BID    piperacillin-tazobactam (ZOSYN) IVPB  4.5 g Intravenous Q8H    polyethylene glycol  17 g Per G Tube BID    senna-docusate 8.6-50 mg  1 tablet Per G Tube BID    sodium chloride 3%  4 mL Nebulization Q8H    thiamine  100 mg Per G Tube Daily     PRN Meds:sodium chloride 0.9%, albuterol-ipratropium, bisacodyL, hydrALAZINE, ondansetron, oxyCODONE, oxyCODONE, sodium chloride 0.9%, sodium chloride 0.9%     Review of patient's allergies indicates:  No Known Allergies  Objective:     Vital Signs (24h Range):  Temp:  [96.3 °F (35.7 °C)-98.8 °F (37.1 °C)] 97.9 °F (36.6 °C)  Pulse:  [74-98] 91  Resp:  [15-20] 19  SpO2:  [92 %-99 %] 97 %  BP: (119-163)/(58-85) 129/73       Lines/Drains/Airways     Drain                 Gastrostomy/Enterostomy 01/04/22 Percutaneous endoscopic gastrostomy (PEG) LUQ 15 days          Airway                 Surgical Airway 01/04/22 1546 Shiley Cuffed 14 days          Peripheral Intravenous Line                  Peripheral IV - Single Lumen 01/15/22 1952 18 G Anterior;Proximal;Right Forearm 3 days                Physical Exam    Awake and alert, NAD  NC/AT, EOMI  Normal external nose   MMM, rosemary-tongue ALTFF in place with good color that approximates the thigh, good tugar and warm, strong arterial doppler signal, intra-oral sutures intact with no breakdown and dehiscence  Neck soft, advancement flaps in place with areas of epidermolysis, staples intact  6-0 cuffless trach in place and secured with sherry collar, aerating well, very crusty   Normal work of breathing, on trach collar, thick secretions  G tube in place    Significant Labs:  BMP:   Recent Labs   Lab 01/14/22  0236 01/17/22  0755 01/19/22  0558   GLU 92   < > 104      < > 99   CO2 23   < > 27   BUN 21   < > 11   CREATININE 0.6   < > 0.6   CALCIUM 8.9   < > 9.1   MG 2.1  --   --     < > = values in this interval not displayed.     CBC:   Recent Labs   Lab 01/19/22  0558   WBC 7.73   RBC 2.79*   HGB 8.0*   HCT 26.4*   *   MCV 95   MCH 28.7   MCHC 30.3*     Microbiology Results (last 7 days)     Procedure Component Value Units Date/Time    Culture, Respiratory with Gram Stain [[id number]]  (Abnormal)  (Susceptibility) Collected: 01/14/22 1543    Order Status: Completed Specimen: Respiratory from Tracheal Aspirate Updated: 01/18/22 0843     Respiratory Culture No S aureus isolated.      CITROBACTER KOSERI  Moderate        PSEUDOMONAS AERUGINOSA  Few  Normal respiratory ishmael also present       Gram Stain (Respiratory) <10 epithelial cells per low power field.     Gram Stain (Respiratory) Moderate WBC's     Gram Stain (Respiratory) Many Gram positive cocci      Gram Stain (Respiratory) Few Gram negative rods    Aerobic culture [[id number]]  (Abnormal)  (Susceptibility) Collected: 01/16/22 1704    Order Status: Completed Specimen: Wound from Neck Updated: 01/18/22 0822     Aerobic Bacterial Culture PROTEUS MIRABILIS  Few      Culture, Anaerobe [[id number]]  Collected: 01/16/22 1701    Order Status: Completed Specimen: Wound from Neck Updated: 01/18/22 0628     Anaerobic Culture Culture in progress          Significant Diagnostics:  I have reviewed and interpreted all pertinent imaging results/findings within the past 24 hours.    Assessment/Plan:     * Squamous cell cancer of tongue  71M with [id number] SCC of the oral tongue s/p total glossectomy, bilateral neck dissections levels I-IV, tracheostomy, and ALTFF reconstruction with G tube placement by general surgery. Resp cx +citrobacter, proteus, and pseudomonas     -Appreciate ID consult, will transition to Ciprofloxacin for discharge   -Consult wound care for recommendations re:epidermolysis of neck   -Will change trach to fresh trach given recent respiratory infection   -Continue q4h flap checks  -Continue TFs  -Plavix/ASA  -pain control (scheduled tylenol, oxycodone PRN)  -trach teaching with respiratory   -PT/OT, OOB    Ppx: L40, SCDs, NIKITA hose     Dispo: Ready for discharge to rehab with trach and PEG this week             Annmarie Oliveira MD  Otorhinolaryngology-Head & Neck Surgery  [facility]

## 2022-01-19 NOTE — PLAN OF CARE
Problem: Occupational Therapy Goal  Goal: Occupational Therapy Goal  Description: Goals to be met by: 7 days, extended on 1/18/22 to be met on 1/26/2022    Patient will increase functional independence with ADLs by performing:    Pt to complete standing g/h skills with SBA  Pt to complete UE dressing with DARRYL  (met 1/18, updated to supervision)  Pt to complete LE dressing with MIN A   Pt to complete toileting with SBA  Pt to complete t/f to commode, bed and chair with SBA    Goals remain appropriate and updated to date. Continue OT  Gemma Salvador OT  1/19/2022    Outcome: Ongoing, Progressing

## 2022-01-19 NOTE — PT/OT/SLP PROGRESS
Physical Therapy Treatment    Patient Name:  Fareed Richard Jr.   MRN:  [id number]    Recommendations:     Discharge Recommendations:  rehabilitation facility   Discharge Equipment Recommendations: none   Barriers to discharge: None    Assessment:     Fareed Richard Jr. is a 71 y.o. male admitted with a medical diagnosis of Squamous cell cancer of tongue.  He presents with the following impairments/functional limitations:  weakness,impaired endurance,impaired self care skills,impaired functional mobilty,gait instability,impaired balance,impaired cardiopulmonary response to activity Pt. cooperative and tolerated treatment well. Pt. progressing with mobility, but still fatigues quickly with activity.    Rehab Prognosis: Good; patient would benefit from acute skilled PT services to address these deficits and reach maximum level of function.    Recent Surgery: Procedure(s) (LRB):  GLOSSECTOMY (N/A)  TRACHEOTOMY (N/A)  EGD, WITH PEG TUBE INSERTION (N/A)  CREATION, FREE FLAP (Right)  DISSECTION, NECK (Bilateral) 15 Days Post-Op    Plan:     During this hospitalization, patient to be seen 4 x/week to address the identified rehab impairments via gait training,therapeutic activities,therapeutic exercises and progress toward the following goals:    · Plan of Care Expires:  02/11/22    Subjective     Chief Complaint: deconditioned  Patient/Family Comments/goals: pt. Agreeable to PT  Pain/Comfort:  · Pain Rating 1: 0/10  · Pain Rating Post-Intervention 1: 0/10      Objective:     Communicated with nursing prior to session.  Patient found up in chair with pulse ox (continuous),telemetry,oxygen,peripheral IV upon PT entry to room.     General Precautions: Standard, fall   Orthopedic Precautions:N/A   Braces: N/A  Respiratory Status: trach collar     Functional Mobility:  · Transfers:     · Sit to Stand:  contact guard assistance with no AD  · Bed to Chair: contact guard assistance with  hand-held assist  using  Stand Pivot  · Gait:  8' x2 trials with CGA without AD. Pt. amb. with decreased step length/tammi and holding on to furniture for support at times  · Balance: fair-      AM-PAC 6 CLICK MOBILITY  Turning over in bed (including adjusting bedclothes, sheets and blankets)?: 3  Sitting down on and standing up from a chair with arms (e.g., wheelchair, bedside commode, etc.): 3  Moving from lying on back to sitting on the side of the bed?: 3  Moving to and from a bed to a chair (including a wheelchair)?: 3  Need to walk in hospital room?: 3  Climbing 3-5 steps with a railing?: 3  Basic Mobility Total Score: 18       Therapeutic Activities and Exercises:   Discussed pt.'s progress, goals, and POC. Assisted pt. on/off BSC and performed inez-care for pt. while he was standing holding on to IV pole    Patient left up in chair with all lines intact and call button in reach..    GOALS:   Multidisciplinary Problems     Physical Therapy Goals        Problem: Physical Therapy Goal    Goal Priority Disciplines Outcome Goal Variances Interventions   Physical Therapy Goal     PT, PT/OT Ongoing, Progressing     Description: Goals to be met by: 2022    Patient will increase functional independence with mobility by performin. Supine to sit with Supervision -not met  2. Sit to stand transfer with Supervision using RW -not met  3. Gait  x 150 feet with Supervision using Rolling Walker. -not met  4. Ascend/descend 3 stair with right Handrails CGA -not met  5. Stand for 3 minutes with Supervision using RW -not met                       Time Tracking:     PT Received On: 22  PT Start Time:      PT Stop Time: 1548  PT Total Time (min): 15 min     Billable Minutes: Gait Training 15    Treatment Type: Treatment  PT/PTA: PT     PTA Visit Number: 0     2022

## 2022-01-19 NOTE — HPI
71-year-old male with CAD, PAD admitted for surgical intervention of head and neck squamous cell carcinoma.  The patient underwent total glossectomy, neck dissection levels 1-4, anterolateral thigh free flap reconstruction, tracheostomy, and PEG tube placement.  As noted in progress notes from the past few days, noted intermittent hypoxia with copious secretions.  1/14/22 respiratory culture growing citrobacter koseri and pseudomonas.  1/16/22 wound culture of neck growing proteus mirabilis.  The patient is able to communiacted with whitebaord and gestures, and does report more difficulty breathing in the past few days which is now improved after primary team started Zosyn.  Infectious Disease has been consulted regarding outpatient antibiotic regimen.

## 2022-01-19 NOTE — SUBJECTIVE & OBJECTIVE
Past Medical History:   Diagnosis Date    Cancer     skin to Rt forearm and face    COPD (chronic obstructive pulmonary disease)     Hyperlipidemia     Hypertension     Pseudoaneurysm        Past Surgical History:   Procedure Laterality Date    ABDOMINAL SURGERY      stents placed in liver and large intestines, per patient    CAROTID STENT      CORONARY STENT PLACEMENT  01/2000    DISSECTION OF NECK Bilateral 1/4/2022    Procedure: DISSECTION, NECK;  Surgeon: Naeem Smalls MD;  Location: [facility] OR [address] [place] [address];  Service: ENT;  Laterality: Bilateral;    ESOPHAGOGASTRODUODENOSCOPY W/ PEG N/A 1/4/2022    Procedure: EGD, WITH PEG TUBE INSERTION;  Surgeon: Anthony Calabrese MD;  Location: [address];  Service: General;  Laterality: N/A;    EYE SURGERY      Cataract bilateral    femoral stents      bilateral    FLAP PROCEDURE N/A 1/3/2022    Procedure: CREATION, FREE FLAP;  Surgeon: Naeem Smalls MD;  Location: [address];  Service: ENT;  Laterality: N/A;    FLAP PROCEDURE Right 1/4/2022    Procedure: CREATION, FREE FLAP;  Surgeon: Stacey Conde MD;  Location: [facility] OR [address] [place] [address];  Service: ENT;  Laterality: Right;  Ischemic start 1203  Ischemic stop 1353    GLOSSECTOMY N/A 1/4/2022    Procedure: GLOSSECTOMY;  Surgeon: Naeem Smalls MD;  Location: [facility] OR [address] [place] [address];  Service: ENT;  Laterality: N/A;    RADICAL NECK DISSECTION Left 1/3/2022    Procedure: DISSECTION, NECK, RADICAL;  Surgeon: Naeem Smalls MD;  Location: [facility] OR [address] [place] [address];  Service: ENT;  Laterality: Left;    SKIN CANCER EXCISION      TRACHEOTOMY N/A 1/4/2022    Procedure: TRACHEOTOMY;  Surgeon: Naeem Smalls MD;  Location: [facility] OR [address] [place] [address];  Service: ENT;  Laterality: N/A;       Review of patient's allergies indicates:  No Known Allergies    Medications:  Medications Prior to Admission   Medication Sig    amlodipine (NORVASC) 10 MG tablet Take 10 mg by mouth once daily.    atorvastatin (LIPITOR) 20 MG tablet  Take 20 mg by mouth once daily.    hydrALAZINE (APRESOLINE) 25 MG tablet Take 25 mg by mouth 3 (three) times daily.    losartan (COZAAR) 100 MG tablet Take 1 tablet by mouth once daily.    metoprolol succinate (TOPROL-XL) 200 MG 24 hr tablet Take 200 mg by mouth 2 (two) times daily.    umeclidinium-vilanteroL (ANORO ELLIPTA) 62.5-25 mcg/actuation DsDv Inhale 1 puff into the lungs once daily. Controller    VENTOLIN HFA 90 mcg/actuation inhaler Inhale 2 puffs into the lungs every 4 (four) hours as needed.    aspirin (ECOTRIN) 81 MG EC tablet Take 81 mg by mouth once daily.    co-enzyme Q-10 50 mg capsule Take 50 mg by mouth once daily.    multivitamin capsule Take 1 capsule by mouth once daily.    ondansetron (ZOFRAN-ODT) 4 MG TbDL Take 4 mg by mouth as needed.     Antibiotics (From admission, onward)            Start     Stop Route Frequency Ordered    01/16/22 0830  piperacillin-tazobactam 4.5 g in sodium chloride 0.9% 100 mL IVPB (ready to mix system)         -- IV Every 8 hours (non-standard times) 01/16/22 0824    01/04/22 2100  bacitracin zinc ointment         -- Top 2 times daily 01/04/22 1355        Antifungals (From admission, onward)            None        Antivirals (From admission, onward)    None             There is no immunization history on file for this patient.    Family History    None       Social History     Socioeconomic History    Marital status:    Tobacco Use    Smoking status: Former Smoker     Packs/day: 2.00     Years: 40.00     Pack years: 80.00     Types: Cigarettes     Start date: 4/17/1963     Quit date: 4/17/2018     Years since quitting: 3.7    Smokeless tobacco: Never Used    Tobacco comment: 3/3 ppd x 40 yrs. Currently 3-4 cigarettes daily .He is trying  to quit. Is using a Vapor cigarettes  2-3 x's a day.   Substance and Sexual Activity    Alcohol use: Yes     Alcohol/week: 3.0 standard drinks     Types: 3 Cans of beer per week     Comment: beer daily 3-4     Drug use: No    Sexual activity: Not Currently     Review of Systems   Reason unable to perform ROS: -limited, patient nonverbal.   Constitutional: Negative for chills and fever.   Respiratory: Positive for shortness of breath. Negative for cough.    Gastrointestinal: Negative for diarrhea.     Objective:     Vital Signs (Most Recent):  Temp: 98.1 °F (36.7 °C) (01/18/22 1559)  Pulse: 98 (01/18/22 1559)  Resp: 20 (01/18/22 1559)  BP: 132/70 (01/18/22 1559)  SpO2: 98 % (01/18/22 1559) Vital Signs (24h Range):  Temp:  [97.9 °F (36.6 °C)-98.8 °F (37.1 °C)] 98.1 °F (36.7 °C)  Pulse:  [80-98] 98  Resp:  [16-20] 20  SpO2:  [94 %-100 %] 98 %  BP: (100-132)/(58-70) 132/70     Weight: 66.9 kg (147 lb 7.8 oz)  Body mass index is 22.43 kg/m².    Estimated Creatinine Clearance: 105.3 mL/min (based on SCr of 0.6 mg/dL).    Physical Exam  Constitutional:       General: He is not in acute distress.  HENT:      Head: Normocephalic and atraumatic.      Mouth/Throat:      Mouth: Mucous membranes are moist.      Pharynx: Oropharynx is clear.   Eyes:      General:         Right eye: No discharge.         Left eye: No discharge.      Conjunctiva/sclera: Conjunctivae normal.   Neck:      Comments: -s/p free flap reconstruction, tracheostomy  -surgical site, trach site with small amount of drainage; no obviously erythema  Cardiovascular:      Rate and Rhythm: Normal rate and regular rhythm.   Pulmonary:      Effort: Pulmonary effort is normal.      Breath sounds: Normal breath sounds. No wheezing, rhonchi or rales.   Abdominal:      General: Abdomen is flat.   Musculoskeletal:         General: No deformity.   Skin:     General: Skin is warm.   Neurological:      Mental Status: He is alert and oriented to person, place, and time.   Psychiatric:         Mood and Affect: Mood normal.         Behavior: Behavior normal.         Thought Content: Thought content normal.         Judgment: Judgment normal.         Significant Labs: All pertinent  labs within the past 24 hours have been reviewed.    Significant Imaging: I have reviewed all pertinent imaging results/findings within the past 24 hours.

## 2022-01-19 NOTE — PLAN OF CARE
01/19/22 1039   Post-Acute Status   Post-Acute Authorization Placement   Post-Acute Placement Status Pending payor review/awaiting authorization (if required)   Daniele left message for Donna at [facility] ([phone]) in regards to status of auth request.    UPDATE:11:05 AM  DANIELE received call back from Donna. She does not see an auth request in the system. DANIELE verified how auths are submitted and was told a fax number. DANIELE called Shira with [employer] to inform her of this. She stated she had submitted the auth through the portal, but will now fax it.     UPDATE:2:56 PM  PHN wanting to see latest therapy notes since pt has not been since by PT since 1/14/22.    Kirsten Lutz LMSW  [facility]  [phone]

## 2022-01-19 NOTE — PLAN OF CARE
POC reviewed with Pt. Pt using written communication board. AAOx4. VSS on 5L 28% trach collar. Tolerated 2 cans of tube feed, denies n/v. Voiding per urinal, adequate UOP. X2 BM, passing gas. Up ambulating to bedside commode with assistance. Q4h flap check. Pt up in chair most of day. Pain managed with scheduled Tylenol. Bed in lowest position, call light within reach. WCTM.

## 2022-01-19 NOTE — CONSULTS
[facility]  Infectious Disease  Consult Note    Patient Name: Fareed Richard Jr.  MRN: [id number]  Admission Date: 1/3/2022  Hospital Length of Stay: 15 days  Attending Physician: Naeem Smalls MD  Primary Care Provider: Kodi Tubbs MD     Isolation Status: No active isolations    Patient information was obtained from patient and ER records.      Inpatient consult to Infectious Diseases  Consult performed by: Otilio Munroe MD  Consult ordered by: Annmarie Oliveira MD        Assessment/Plan:     Tracheobronchitis  Tracheobronchitis growing citrobacter koseri, pseudomonas, and proteus mirabilis.  On intermittent tube feeds, opening the possibility to use fluoroquinolones     Recommendations:  -Ciprofloxacin 750mg BID through G tube for 7 days of total therapy, end date 1/23/22  -Can administer between G tube feeds        Thank you for your consult. I will sign off. Please contact us if you have any additional questions.    Otilio Munroe MD  Infectious Disease  [facility]    Subjective:     Principal Problem: Squamous cell cancer of tongue    HPI: 71-year-old male with CAD, PAD admitted for surgical intervention of head and neck squamous cell carcinoma.  The patient underwent total glossectomy, neck dissection levels 1-4, anterolateral thigh free flap reconstruction, tracheostomy, and PEG tube placement.  As noted in progress notes from the past few days, noted intermittent hypoxia with copious secretions.  1/14/22 respiratory culture growing citrobacter koseri and pseudomonas.  1/16/22 wound culture of neck growing proteus mirabilis.  The patient is able to communiacted with whitebaord and gestures, and does report more difficulty breathing in the past few days which is now improved after primary team started Zosyn.  Infectious Disease has been consulted regarding outpatient antibiotic regimen.      Past Medical History:   Diagnosis Date    Cancer     skin to Rt forearm and face    COPD  (chronic obstructive pulmonary disease)     Hyperlipidemia     Hypertension     Pseudoaneurysm        Past Surgical History:   Procedure Laterality Date    ABDOMINAL SURGERY      stents placed in liver and large intestines, per patient    CAROTID STENT      CORONARY STENT PLACEMENT  01/2000    DISSECTION OF NECK Bilateral 1/4/2022    Procedure: DISSECTION, NECK;  Surgeon: Naeem Smalls MD;  Location: [facility] OR [address] [place] [address];  Service: ENT;  Laterality: Bilateral;    ESOPHAGOGASTRODUODENOSCOPY W/ PEG N/A 1/4/2022    Procedure: EGD, WITH PEG TUBE INSERTION;  Surgeon: Anthony Calabrese MD;  Location: [facility] OR [address] [place] [address];  Service: General;  Laterality: N/A;    EYE SURGERY      Cataract bilateral    femoral stents      bilateral    FLAP PROCEDURE N/A 1/3/2022    Procedure: CREATION, FREE FLAP;  Surgeon: Naeem Smalls MD;  Location: [facility] OR [address] [place] [address];  Service: ENT;  Laterality: N/A;    FLAP PROCEDURE Right 1/4/2022    Procedure: CREATION, FREE FLAP;  Surgeon: Stacey Conde MD;  Location: [facility] OR [address] [place] [address];  Service: ENT;  Laterality: Right;  Ischemic start 1203  Ischemic stop 1353    GLOSSECTOMY N/A 1/4/2022    Procedure: GLOSSECTOMY;  Surgeon: Naeem Smalls MD;  Location: [facility] OR [address] [place] [address];  Service: ENT;  Laterality: N/A;    RADICAL NECK DISSECTION Left 1/3/2022    Procedure: DISSECTION, NECK, RADICAL;  Surgeon: Naeem Smalls MD;  Location: [facility] OR [address] [place] [address];  Service: ENT;  Laterality: Left;    SKIN CANCER EXCISION      TRACHEOTOMY N/A 1/4/2022    Procedure: TRACHEOTOMY;  Surgeon: Naeem Smalls MD;  Location: [facility] OR [address] [place] [address];  Service: ENT;  Laterality: N/A;       Review of patient's allergies indicates:  No Known Allergies    Medications:  Medications Prior to Admission   Medication Sig    amlodipine (NORVASC) 10 MG tablet Take 10 mg by mouth once daily.    atorvastatin (LIPITOR) 20 MG tablet Take 20 mg by mouth once daily.    hydrALAZINE (APRESOLINE) 25 MG tablet Take 25 mg by  mouth 3 (three) times daily.    losartan (COZAAR) 100 MG tablet Take 1 tablet by mouth once daily.    metoprolol succinate (TOPROL-XL) 200 MG 24 hr tablet Take 200 mg by mouth 2 (two) times daily.    umeclidinium-vilanteroL (ANORO ELLIPTA) 62.5-25 mcg/actuation DsDv Inhale 1 puff into the lungs once daily. Controller    VENTOLIN HFA 90 mcg/actuation inhaler Inhale 2 puffs into the lungs every 4 (four) hours as needed.    aspirin (ECOTRIN) 81 MG EC tablet Take 81 mg by mouth once daily.    co-enzyme Q-10 50 mg capsule Take 50 mg by mouth once daily.    multivitamin capsule Take 1 capsule by mouth once daily.    ondansetron (ZOFRAN-ODT) 4 MG TbDL Take 4 mg by mouth as needed.     Antibiotics (From admission, onward)            Start     Stop Route Frequency Ordered    01/16/22 0830  piperacillin-tazobactam 4.5 g in sodium chloride 0.9% 100 mL IVPB (ready to mix system)         -- IV Every 8 hours (non-standard times) 01/16/22 0824    01/04/22 2100  bacitracin zinc ointment         -- Top 2 times daily 01/04/22 1355        Antifungals (From admission, onward)            None        Antivirals (From admission, onward)    None             There is no immunization history on file for this patient.    Family History    None       Social History     Socioeconomic History    Marital status:    Tobacco Use    Smoking status: Former Smoker     Packs/day: 2.00     Years: 40.00     Pack years: 80.00     Types: Cigarettes     Start date: 4/17/1963     Quit date: 4/17/2018     Years since quitting: 3.7    Smokeless tobacco: Never Used    Tobacco comment: 3/3 ppd x 40 yrs. Currently 3-4 cigarettes daily .He is trying  to quit. Is using a Vapor cigarettes  2-3 x's a day.   Substance and Sexual Activity    Alcohol use: Yes     Alcohol/week: 3.0 standard drinks     Types: 3 Cans of beer per week     Comment: beer daily 3-4    Drug use: No    Sexual activity: Not Currently     Review of Systems   Reason unable to  perform ROS: -limited, patient nonverbal.   Constitutional: Negative for chills and fever.   Respiratory: Positive for shortness of breath. Negative for cough.    Gastrointestinal: Negative for diarrhea.     Objective:     Vital Signs (Most Recent):  Temp: 96.3 °F (35.7 °C) (01/18/22 1943)  Pulse: 89 (01/18/22 1943)  Resp: 18 (01/18/22 1943)  BP: (!) 163/85 (01/18/22 1943)  SpO2: 98 % (01/18/22 1943) Vital Signs (24h Range):  Temp:  [96.3 °F (35.7 °C)-98.8 °F (37.1 °C)] 96.3 °F (35.7 °C)  Pulse:  [80-98] 89  Resp:  [16-20] 18  SpO2:  [94 %-100 %] 98 %  BP: (110-163)/(58-85) 163/85     Weight: 66.9 kg (147 lb 7.8 oz)  Body mass index is 22.43 kg/m².    Estimated Creatinine Clearance: 105.3 mL/min (based on SCr of 0.6 mg/dL).    Physical Exam  Constitutional:       General: He is not in acute distress.  HENT:      Head: Normocephalic and atraumatic.      Mouth/Throat:      Mouth: Mucous membranes are moist.      Pharynx: Oropharynx is clear.   Eyes:      General:         Right eye: No discharge.         Left eye: No discharge.      Conjunctiva/sclera: Conjunctivae normal.   Neck:      Comments: -s/p free flap reconstruction, tracheostomy  -surgical site, trach site with small amount of drainage; no obviously erythema  Cardiovascular:      Rate and Rhythm: Normal rate and regular rhythm.   Pulmonary:      Effort: Pulmonary effort is normal.      Breath sounds: Normal breath sounds. No wheezing, rhonchi or rales.   Abdominal:      General: Abdomen is flat.   Musculoskeletal:         General: No deformity.   Skin:     General: Skin is warm.   Neurological:      Mental Status: He is alert and oriented to person, place, and time.   Psychiatric:         Mood and Affect: Mood normal.         Behavior: Behavior normal.         Thought Content: Thought content normal.         Judgment: Judgment normal.         Significant Labs: All pertinent labs within the past 24 hours have been reviewed.    Significant Imaging: I have  reviewed all pertinent imaging results/findings within the past 24 hours.

## 2022-01-19 NOTE — PT/OT/SLP PROGRESS
Occupational Therapy   Treatment    Name: Fareed Richard Jr.  MRN: [id number]  Admitting Diagnosis:  Squamous cell cancer of tongue  15 Days Post-Op    Recommendations:     Discharge Recommendations: rehabilitation facility  Discharge Equipment Recommendations:  none  Barriers to discharge:       Assessment:     Fareed Richard Jr. is a 71 y.o. male with a medical diagnosis of Squamous cell cancer of tongue. Pt presents with decreased endurance and impaired mobility performance as limited by cardiovascular status and generalized weakness. Pt found upright in bedside chair and agreeable for therapy given encouragement and education. Pt session focused on sit<Stand t/f trials (x2) and mobility near bedside chair given trach<>02 tubing. Pt required SBA for sit<Stand and CGA to complete mobility using RW. Pt required increased time for cardiovascular recovery for all mobility and attempts at speaking as pt quickly desaturates with all motion (Sp02 ranging ~79-94).  Pt also needed increased time for UB dressing and change 2/2 nature of pt's 02 needs. Pt was left upright in chair and agreed upon continued therapy with pt communicating via mouthing of words and using white board. Pt would benefit from continued OT skilled services 4x/wk to improve daily living skills to optimize QOL. Pt is recommended to discharge to rehab at this time.Performance deficits affecting function are weakness,impaired self care skills,impaired endurance,impaired functional mobilty,gait instability,impaired cardiopulmonary response to activity,impaired balance,decreased lower extremity function,decreased upper extremity function,decreased coordination,decreased safety awareness.     Rehab Prognosis:  Good; patient would benefit from acute skilled OT services to address these deficits and reach maximum level of function.       Plan:     Patient to be seen 4 x/week to address the above listed problems via self-care/home management,therapeutic  activities,therapeutic exercises  · Plan of Care Expires: 02/12/22  · Plan of Care Reviewed with: patient    Subjective     Pain/Comfort:  · Pain Rating 1: 0/10  · Pain Rating Post-Intervention 1: 0/10  · Pain Rating Post-Intervention 2: 0/10    Objective:     Communicated with: RN prior to session.  Patient found up in chair with telemetry,pulse ox (continuous),peripheral IV,PEG Tube,RACHEL drain,Tracheostomy,oxygen upon OT entry to room.    General Precautions: Standard, fall,NPO,aspiration   Orthopedic Precautions:N/A   Braces: N/A  Respiratory Status: trach to 02 (5L)     Occupational Performance:     Bed Mobility:    · NT    Functional Mobility/Transfers:  · Patient completed Sit <> Stand Transfer with contact guard assistance  with  rolling walker   · Functional Mobility: Pt stood x2 today with first standing trial ~10 seconds with CGA using RW. Pt quickly desaturated and requiring seated oral suctioning for secretions around trach. Pt desatted to ~83 and after 1 minute recovery improved to 93. Pt stood again and took ~4 steps forward/backward requiring seated rest break for 02 recovery in similar manner to first mobility trial (Desaturating to 79 but recovery to 92).     Activities of Daily Living:  · Feeding:  NPO .  · Grooming: pt refused despite max attempts and tools for oral care and face/body cleaning setup for pt at chair .  · Upper Body Dressing: minimum assistance donning clean gown and doffing dirty gown      [facility] 6 Click ADL: 13    Treatment & Education:  Pt educated on role of occupational therapy, POC, and safety during ADLs and functional mobility. Pt and OT discussed importance of safe, continued mobility to optimize daily living skills. Pt verbalized understanding via written communication.   Pt completed the following during session: sit<Stand t/f trials, education on rehab vs HH and pt's current deficits, guided relaxation and breathing strategies, UB dressing, short distance mobility training,  gentle encouragement to pt's needs.  White board updated during session. Pt given instruction to call for medical staff/nurse for assistance.       Patient left up in chair with all lines intact, call button in reach and RN notifiedEducation:      GOALS:   Multidisciplinary Problems     Occupational Therapy Goals        Problem: Occupational Therapy Goal    Goal Priority Disciplines Outcome Interventions   Occupational Therapy Goal     OT, PT/OT Ongoing, Progressing    Description: Goals to be met by: 7 days 1/18/22     Patient will increase functional independence with ADLs by performing:    Pt to complete standing g/h skills with SBA  Pt to complete UE dressing with DARRYL    Pt to complete LE dressing with MIN A   Pt to complete toileting with SBA  Pt to complete t/f to commode, bed and chair with SBA                     Time Tracking:     OT Date of Treatment: 01/19/22  OT Start Time: 1045  OT Stop Time: 1115  OT Total Time (min): 30 min    Billable Minutes:Self Care/Home Management 15 min  Therapeutic Activity 15 min    OT/NELLY: OT     NELLY Visit Number: 0    1/19/2022

## 2022-01-19 NOTE — SUBJECTIVE & OBJECTIVE
Past Medical History:   Diagnosis Date    Cancer     skin to Rt forearm and face    COPD (chronic obstructive pulmonary disease)     Hyperlipidemia     Hypertension     Pseudoaneurysm        Past Surgical History:   Procedure Laterality Date    ABDOMINAL SURGERY      stents placed in liver and large intestines, per patient    CAROTID STENT      CORONARY STENT PLACEMENT  01/2000    DISSECTION OF NECK Bilateral 1/4/2022    Procedure: DISSECTION, NECK;  Surgeon: Naeem Smalls MD;  Location: [facility] OR [address] [place] [address];  Service: ENT;  Laterality: Bilateral;    ESOPHAGOGASTRODUODENOSCOPY W/ PEG N/A 1/4/2022    Procedure: EGD, WITH PEG TUBE INSERTION;  Surgeon: Anthony Calabrese MD;  Location: [address];  Service: General;  Laterality: N/A;    EYE SURGERY      Cataract bilateral    femoral stents      bilateral    FLAP PROCEDURE N/A 1/3/2022    Procedure: CREATION, FREE FLAP;  Surgeon: Naeem Smalls MD;  Location: [address];  Service: ENT;  Laterality: N/A;    FLAP PROCEDURE Right 1/4/2022    Procedure: CREATION, FREE FLAP;  Surgeon: Stacey Conde MD;  Location: [facility] OR [address] [place] [address];  Service: ENT;  Laterality: Right;  Ischemic start 1203  Ischemic stop 1353    GLOSSECTOMY N/A 1/4/2022    Procedure: GLOSSECTOMY;  Surgeon: Naeem Smalls MD;  Location: [facility] OR [address] [place] [address];  Service: ENT;  Laterality: N/A;    RADICAL NECK DISSECTION Left 1/3/2022    Procedure: DISSECTION, NECK, RADICAL;  Surgeon: Naeem mSalls MD;  Location: [facility] OR [address] [place] [address];  Service: ENT;  Laterality: Left;    SKIN CANCER EXCISION      TRACHEOTOMY N/A 1/4/2022    Procedure: TRACHEOTOMY;  Surgeon: Naeem Smalls MD;  Location: [facility] OR [address] [place] [address];  Service: ENT;  Laterality: N/A;       Review of patient's allergies indicates:  No Known Allergies    Medications:  Medications Prior to Admission   Medication Sig    amlodipine (NORVASC) 10 MG tablet Take 10 mg by mouth once daily.    atorvastatin (LIPITOR) 20 MG tablet  Take 20 mg by mouth once daily.    hydrALAZINE (APRESOLINE) 25 MG tablet Take 25 mg by mouth 3 (three) times daily.    losartan (COZAAR) 100 MG tablet Take 1 tablet by mouth once daily.    metoprolol succinate (TOPROL-XL) 200 MG 24 hr tablet Take 200 mg by mouth 2 (two) times daily.    umeclidinium-vilanteroL (ANORO ELLIPTA) 62.5-25 mcg/actuation DsDv Inhale 1 puff into the lungs once daily. Controller    VENTOLIN HFA 90 mcg/actuation inhaler Inhale 2 puffs into the lungs every 4 (four) hours as needed.    aspirin (ECOTRIN) 81 MG EC tablet Take 81 mg by mouth once daily.    co-enzyme Q-10 50 mg capsule Take 50 mg by mouth once daily.    multivitamin capsule Take 1 capsule by mouth once daily.    ondansetron (ZOFRAN-ODT) 4 MG TbDL Take 4 mg by mouth as needed.     Antibiotics (From admission, onward)            Start     Stop Route Frequency Ordered    01/16/22 0830  piperacillin-tazobactam 4.5 g in sodium chloride 0.9% 100 mL IVPB (ready to mix system)         -- IV Every 8 hours (non-standard times) 01/16/22 0824    01/04/22 2100  bacitracin zinc ointment         -- Top 2 times daily 01/04/22 1355        Antifungals (From admission, onward)            None        Antivirals (From admission, onward)    None             There is no immunization history on file for this patient.    Family History    None       Social History     Socioeconomic History    Marital status:    Tobacco Use    Smoking status: Former Smoker     Packs/day: 2.00     Years: 40.00     Pack years: 80.00     Types: Cigarettes     Start date: 4/17/1963     Quit date: 4/17/2018     Years since quitting: 3.7    Smokeless tobacco: Never Used    Tobacco comment: 3/3 ppd x 40 yrs. Currently 3-4 cigarettes daily .He is trying  to quit. Is using a Vapor cigarettes  2-3 x's a day.   Substance and Sexual Activity    Alcohol use: Yes     Alcohol/week: 3.0 standard drinks     Types: 3 Cans of beer per week     Comment: beer daily 3-4     Drug use: No    Sexual activity: Not Currently     Review of Systems   Reason unable to perform ROS: -limited, patient nonverbal.   Constitutional: Negative for chills and fever.   Respiratory: Positive for shortness of breath. Negative for cough.    Gastrointestinal: Negative for diarrhea.     Objective:     Vital Signs (Most Recent):  Temp: 96.3 °F (35.7 °C) (01/18/22 1943)  Pulse: 89 (01/18/22 1943)  Resp: 18 (01/18/22 1943)  BP: (!) 163/85 (01/18/22 1943)  SpO2: 98 % (01/18/22 1943) Vital Signs (24h Range):  Temp:  [96.3 °F (35.7 °C)-98.8 °F (37.1 °C)] 96.3 °F (35.7 °C)  Pulse:  [80-98] 89  Resp:  [16-20] 18  SpO2:  [94 %-100 %] 98 %  BP: (110-163)/(58-85) 163/85     Weight: 66.9 kg (147 lb 7.8 oz)  Body mass index is 22.43 kg/m².    Estimated Creatinine Clearance: 105.3 mL/min (based on SCr of 0.6 mg/dL).    Physical Exam  Constitutional:       General: He is not in acute distress.  HENT:      Head: Normocephalic and atraumatic.      Mouth/Throat:      Mouth: Mucous membranes are moist.      Pharynx: Oropharynx is clear.   Eyes:      General:         Right eye: No discharge.         Left eye: No discharge.      Conjunctiva/sclera: Conjunctivae normal.   Neck:      Comments: -s/p free flap reconstruction, tracheostomy  -surgical site, trach site with small amount of drainage; no obviously erythema  Cardiovascular:      Rate and Rhythm: Normal rate and regular rhythm.   Pulmonary:      Effort: Pulmonary effort is normal.      Breath sounds: Normal breath sounds. No wheezing, rhonchi or rales.   Abdominal:      General: Abdomen is flat.   Musculoskeletal:         General: No deformity.   Skin:     General: Skin is warm.   Neurological:      Mental Status: He is alert and oriented to person, place, and time.   Psychiatric:         Mood and Affect: Mood normal.         Behavior: Behavior normal.         Thought Content: Thought content normal.         Judgment: Judgment normal.         Significant Labs: All  pertinent labs within the past 24 hours have been reviewed.    Significant Imaging: I have reviewed all pertinent imaging results/findings within the past 24 hours.

## 2022-01-19 NOTE — ASSESSMENT & PLAN NOTE
71M with [id number] SCC of the oral tongue s/p total glossectomy, bilateral neck dissections levels I-IV, tracheostomy, and ALTFF reconstruction with G tube placement by general surgery. Resp cx +citrobacter, proteus, and pseudomonas     -Appreciate ID consult, will transition to Ciprofloxacin for discharge   -Consult wound care for recommendations re:epidermolysis of neck   -Will change trach to fresh trach given recent respiratory infection   -Continue q4h flap checks  -Continue TFs  -Plavix/ASA  -pain control (scheduled tylenol, oxycodone PRN)  -trach teaching with respiratory   -PT/OT, OOB    Ppx: L40, SCDs, NIKITA hose     Dispo: Ready for discharge to rehab with trach and PEG this week

## 2022-01-19 NOTE — PROGRESS NOTES
[facility]  Infectious Disease  Progress Note    Patient Name: Fareed Richard Jr.  MRN: [id number]  Admission Date: 1/3/2022  Length of Stay: 15 days  Attending Physician: Naeem Smalls MD  Primary Care Provider: Kodi Tubbs MD    Isolation Status: No active isolations  Assessment/Plan:      Tracheobronchitis  Tracheobronchitis growing citrobacter koseri, pseudomonas, and proteus mirabilis.  On intermittent tube feeds, opening the possibility to use fluoroquinolones     Recommendations:  -Ciprofloxacin 750mg BID through G tube for 7 days of total therapy, end date 1/23/22  -Can administer between G tube feeds        Thank you for your consult. I will sign off. Please contact us if you have any additional questions.    Otilio Munroe MD  Infectious Disease  [facility]    Subjective:     Principal Problem:Squamous cell cancer of tongue    HPI: 71-year-old male with CAD, PAD admitted for surgical intervention of head and neck squamous cell carcinoma.  The patient underwent total glossectomy, neck dissection levels 1-4, anterolateral thigh free flap reconstruction, tracheostomy, and PEG tube placement.  As noted in progress notes from the past few days, noted intermittent hypoxia with copious secretions.  1/14/22 respiratory culture growing citrobacter koseri and pseudomonas.  1/16/22 wound culture of neck growing proteus mirabilis.  The patient is able to communiacted with whitebaord and gestures, and does report more difficulty breathing in the past few days which is now improved after primary team started Zosyn.  Infectious Disease has been consulted regarding outpatient antibiotic regimen.    Past Medical History:   Diagnosis Date    Cancer     skin to Rt forearm and face    COPD (chronic obstructive pulmonary disease)     Hyperlipidemia     Hypertension     Pseudoaneurysm        Past Surgical History:   Procedure Laterality Date    ABDOMINAL SURGERY      stents placed in liver and large  intestines, per patient    CAROTID STENT      CORONARY STENT PLACEMENT  01/2000    DISSECTION OF NECK Bilateral 1/4/2022    Procedure: DISSECTION, NECK;  Surgeon: Naeem Smalls MD;  [facility];  Service: ENT;  Laterality: Bilateral;    ESOPHAGOGASTRODUODENOSCOPY W/ PEG N/A 1/4/2022    Procedure: EGD, WITH PEG TUBE INSERTION;  Surgeon: Anthony Calabrese MD;  [facility];  Service: General;  Laterality: N/A;    EYE SURGERY      Cataract bilateral    femoral stents      bilateral    FLAP PROCEDURE N/A 1/3/2022    Procedure: CREATION, FREE FLAP;  Surgeon: Naeem Smalls MD;  [facility];  Service: ENT;  Laterality: N/A;    FLAP PROCEDURE Right 1/4/2022    Procedure: CREATION, FREE FLAP;  Surgeon: Stacey Conde MD;  [facility];  Service: ENT;  Laterality: Right;  Ischemic start 1203  Ischemic stop 1353    GLOSSECTOMY N/A 1/4/2022    Procedure: GLOSSECTOMY;  Surgeon: Naeem Smalls MD;  [facility];  Service: ENT;  Laterality: N/A;    RADICAL NECK DISSECTION Left 1/3/2022    Procedure: DISSECTION, NECK, RADICAL;  Surgeon: Naeem Smalls MD;  [facility];  Service: ENT;  Laterality: Left;    SKIN CANCER EXCISION      TRACHEOTOMY N/A 1/4/2022    Procedure: TRACHEOTOMY;  Surgeon: Naeem Smalls MD;  [facility];  Service: ENT;  Laterality: N/A;       Review of patient's allergies indicates:  No Known Allergies    Medications:  Medications Prior to Admission   Medication Sig    amlodipine (NORVASC) 10 MG tablet Take 10 mg by mouth once daily.    atorvastatin (LIPITOR) 20 MG tablet Take 20 mg by mouth once daily.    hydrALAZINE (APRESOLINE) 25 MG tablet Take 25 mg by mouth 3 (three) times daily.    losartan (COZAAR) 100 MG tablet Take 1 tablet by mouth once daily.    metoprolol succinate (TOPROL-XL) 200 MG 24 hr tablet Take 200 mg by mouth 2 (two) times daily.     umeclidinium-vilanteroL (ANORO ELLIPTA) 62.5-25 mcg/actuation DsDv Inhale 1 puff into the lungs once daily. Controller    VENTOLIN HFA 90 mcg/actuation inhaler Inhale 2 puffs into the lungs every 4 (four) hours as needed.    aspirin (ECOTRIN) 81 MG EC tablet Take 81 mg by mouth once daily.    co-enzyme Q-10 50 mg capsule Take 50 mg by mouth once daily.    multivitamin capsule Take 1 capsule by mouth once daily.    ondansetron (ZOFRAN-ODT) 4 MG TbDL Take 4 mg by mouth as needed.     Antibiotics (From admission, onward)            Start     Stop Route Frequency Ordered    01/16/22 0830  piperacillin-tazobactam 4.5 g in sodium chloride 0.9% 100 mL IVPB (ready to mix system)         -- IV Every 8 hours (non-standard times) 01/16/22 0824    01/04/22 2100  bacitracin zinc ointment         -- Top 2 times daily 01/04/22 1355        Antifungals (From admission, onward)            None        Antivirals (From admission, onward)    None             There is no immunization history on file for this patient.    Family History    None       Social History     Socioeconomic History    Marital status:    Tobacco Use    Smoking status: Former Smoker     Packs/day: 2.00     Years: 40.00     Pack years: 80.00     Types: Cigarettes     Start date: 4/17/1963     Quit date: 4/17/2018     Years since quitting: 3.7    Smokeless tobacco: Never Used    Tobacco comment: 3/3 ppd x 40 yrs. Currently 3-4 cigarettes daily .He is trying  to quit. Is using a Vapor cigarettes  2-3 x's a day.   Substance and Sexual Activity    Alcohol use: Yes     Alcohol/week: 3.0 standard drinks     Types: 3 Cans of beer per week     Comment: beer daily 3-4    Drug use: No    Sexual activity: Not Currently     Review of Systems   Reason unable to perform ROS: -limited, patient nonverbal.   Constitutional: Negative for chills and fever.   Respiratory: Positive for shortness of breath. Negative for cough.    Gastrointestinal: Negative for diarrhea.      Objective:     Vital Signs (Most Recent):  Temp: 98.1 °F (36.7 °C) (01/18/22 1559)  Pulse: 98 (01/18/22 1559)  Resp: 20 (01/18/22 1559)  BP: 132/70 (01/18/22 1559)  SpO2: 98 % (01/18/22 1559) Vital Signs (24h Range):  Temp:  [97.9 °F (36.6 °C)-98.8 °F (37.1 °C)] 98.1 °F (36.7 °C)  Pulse:  [80-98] 98  Resp:  [16-20] 20  SpO2:  [94 %-100 %] 98 %  BP: (100-132)/(58-70) 132/70     Weight: 66.9 kg (147 lb 7.8 oz)  Body mass index is 22.43 kg/m².    Estimated Creatinine Clearance: 105.3 mL/min (based on SCr of 0.6 mg/dL).    Physical Exam  Constitutional:       General: He is not in acute distress.  HENT:      Head: Normocephalic and atraumatic.      Mouth/Throat:      Mouth: Mucous membranes are moist.      Pharynx: Oropharynx is clear.   Eyes:      General:         Right eye: No discharge.         Left eye: No discharge.      Conjunctiva/sclera: Conjunctivae normal.   Neck:      Comments: -s/p free flap reconstruction, tracheostomy  -surgical site, trach site with small amount of drainage; no obviously erythema  Cardiovascular:      Rate and Rhythm: Normal rate and regular rhythm.   Pulmonary:      Effort: Pulmonary effort is normal.      Breath sounds: Normal breath sounds. No wheezing, rhonchi or rales.   Abdominal:      General: Abdomen is flat.   Musculoskeletal:         General: No deformity.   Skin:     General: Skin is warm.   Neurological:      Mental Status: He is alert and oriented to person, place, and time.   Psychiatric:         Mood and Affect: Mood normal.         Behavior: Behavior normal.         Thought Content: Thought content normal.         Judgment: Judgment normal.         Significant Labs: All pertinent labs within the past 24 hours have been reviewed.    Significant Imaging: I have reviewed all pertinent imaging results/findings within the past 24 hours.

## 2022-01-19 NOTE — SUBJECTIVE & OBJECTIVE
Interval History: NAEON. Patient says he is ready to get out of the hospital. Awaiting placement.     Medications:  Continuous Infusions:   sodium chloride 0.9% Stopped (01/06/22 0636)     Scheduled Meds:   acetaminophen  650 mg Per G Tube Q6H    aspirin  81 mg Per G Tube Daily    atorvastatin  20 mg Per G Tube Daily    bacitracin zinc   Topical (Top) BID    clopidogreL  75 mg Per G Tube Daily    enoxaparin  40 mg Subcutaneous Daily    folic acid  1 mg Per G Tube Daily    guaiFENesin 100 mg/5 ml  200 mg Per G Tube Q4H    losartan  50 mg Per G Tube Daily    magnesium citrate  296 mL Per G Tube Once    melatonin  6 mg Per G Tube Nightly    metoprolol tartrate  100 mg Per G Tube BID    piperacillin-tazobactam (ZOSYN) IVPB  4.5 g Intravenous Q8H    polyethylene glycol  17 g Per G Tube BID    senna-docusate 8.6-50 mg  1 tablet Per G Tube BID    sodium chloride 3%  4 mL Nebulization Q8H    thiamine  100 mg Per G Tube Daily     PRN Meds:sodium chloride 0.9%, albuterol-ipratropium, bisacodyL, hydrALAZINE, ondansetron, oxyCODONE, oxyCODONE, sodium chloride 0.9%, sodium chloride 0.9%     Review of patient's allergies indicates:  No Known Allergies  Objective:     Vital Signs (24h Range):  Temp:  [96.3 °F (35.7 °C)-98.8 °F (37.1 °C)] 97.9 °F (36.6 °C)  Pulse:  [74-98] 91  Resp:  [15-20] 19  SpO2:  [92 %-99 %] 97 %  BP: (119-163)/(58-85) 129/73       Lines/Drains/Airways     Drain                 Gastrostomy/Enterostomy 01/04/22 Percutaneous endoscopic gastrostomy (PEG) LUQ 15 days          Airway                 Surgical Airway 01/04/22 1546 Shiley Cuffed 14 days          Peripheral Intravenous Line                 Peripheral IV - Single Lumen 01/15/22 1952 18 G Anterior;Proximal;Right Forearm 3 days                Physical Exam    Awake and alert, NAD  NC/AT, EOMI  Normal external nose   MMM, rosemary-tongue ALTFF in place with good color that approximates the thigh, good tugar and warm, strong arterial doppler  signal, intra-oral sutures intact with no breakdown and dehiscence  Neck soft, advancement flaps in place with areas of epidermolysis, staples intact  6-0 cuffless trach in place and secured with sherry collar, aerating well, very crusty   Normal work of breathing, on trach collar, thick secretions  G tube in place    Significant Labs:  BMP:   Recent Labs   Lab 01/14/22  0236 01/17/22  0755 01/19/22  0558   GLU 92   < > 104      < > 99   CO2 23   < > 27   BUN 21   < > 11   CREATININE 0.6   < > 0.6   CALCIUM 8.9   < > 9.1   MG 2.1  --   --     < > = values in this interval not displayed.     CBC:   Recent Labs   Lab 01/19/22  0558   WBC 7.73   RBC 2.79*   HGB 8.0*   HCT 26.4*   *   MCV 95   MCH 28.7   MCHC 30.3*     Microbiology Results (last 7 days)     Procedure Component Value Units Date/Time    Culture, Respiratory with Gram Stain [[id number]]  (Abnormal)  (Susceptibility) Collected: 01/14/22 1543    Order Status: Completed Specimen: Respiratory from Tracheal Aspirate Updated: 01/18/22 0843     Respiratory Culture No S aureus isolated.      CITROBACTER KOSERI  Moderate        PSEUDOMONAS AERUGINOSA  Few  Normal respiratory ishmael also present       Gram Stain (Respiratory) <10 epithelial cells per low power field.     Gram Stain (Respiratory) Moderate WBC's     Gram Stain (Respiratory) Many Gram positive cocci      Gram Stain (Respiratory) Few Gram negative rods    Aerobic culture [[id number]]  (Abnormal)  (Susceptibility) Collected: 01/16/22 1704    Order Status: Completed Specimen: Wound from Neck Updated: 01/18/22 0822     Aerobic Bacterial Culture PROTEUS MIRABILIS  Few      Culture, Anaerobe [[id number]] Collected: 01/16/22 1704    Order Status: Completed Specimen: Wound from Neck Updated: 01/18/22 0628     Anaerobic Culture Culture in progress          Significant Diagnostics:  I have reviewed and interpreted all pertinent imaging results/findings within the past 24 hours.

## 2022-01-20 PROCEDURE — 94761 N-INVAS EAR/PLS OXIMETRY MLT: CPT

## 2022-01-20 PROCEDURE — 25000003 PHARM REV CODE 250: Performed by: STUDENT IN AN ORGANIZED HEALTH CARE EDUCATION/TRAINING PROGRAM

## 2022-01-20 PROCEDURE — 25000003 PHARM REV CODE 250: Performed by: NURSE PRACTITIONER

## 2022-01-20 PROCEDURE — 25000003 PHARM REV CODE 250: Performed by: OTOLARYNGOLOGY

## 2022-01-20 PROCEDURE — 25000242 PHARM REV CODE 250 ALT 637 W/ HCPCS: Performed by: STUDENT IN AN ORGANIZED HEALTH CARE EDUCATION/TRAINING PROGRAM

## 2022-01-20 PROCEDURE — 27100171 HC OXYGEN HIGH FLOW UP TO 24 HOURS

## 2022-01-20 PROCEDURE — 25000003 PHARM REV CODE 250

## 2022-01-20 PROCEDURE — 25000242 PHARM REV CODE 250 ALT 637 W/ HCPCS: Performed by: OTOLARYNGOLOGY

## 2022-01-20 PROCEDURE — 99900035 HC TECH TIME PER 15 MIN (STAT)

## 2022-01-20 PROCEDURE — 20600001 HC STEP DOWN PRIVATE ROOM

## 2022-01-20 PROCEDURE — 27200966 HC CLOSED SUCTION SYSTEM

## 2022-01-20 PROCEDURE — 63600175 PHARM REV CODE 636 W HCPCS: Performed by: STUDENT IN AN ORGANIZED HEALTH CARE EDUCATION/TRAINING PROGRAM

## 2022-01-20 PROCEDURE — 94668 MNPJ CHEST WALL SBSQ: CPT

## 2022-01-20 PROCEDURE — 99900026 HC AIRWAY MAINTENANCE (STAT)

## 2022-01-20 PROCEDURE — 94640 AIRWAY INHALATION TREATMENT: CPT

## 2022-01-20 PROCEDURE — 27000221 HC OXYGEN, UP TO 24 HOURS

## 2022-01-20 RX ADMIN — ACETAMINOPHEN 650 MG: 325 TABLET ORAL at 11:01

## 2022-01-20 RX ADMIN — GUAIFENESIN 200 MG: 100 SOLUTION ORAL at 09:01

## 2022-01-20 RX ADMIN — ACETAMINOPHEN 650 MG: 325 TABLET ORAL at 12:01

## 2022-01-20 RX ADMIN — METOPROLOL TARTRATE 100 MG: 50 TABLET, FILM COATED ORAL at 10:01

## 2022-01-20 RX ADMIN — GUAIFENESIN 200 MG: 100 SOLUTION ORAL at 05:01

## 2022-01-20 RX ADMIN — CIPROFLOXACIN 750 MG: KIT at 09:01

## 2022-01-20 RX ADMIN — LOSARTAN POTASSIUM 50 MG: 50 TABLET, FILM COATED ORAL at 09:01

## 2022-01-20 RX ADMIN — OXYCODONE HYDROCHLORIDE 10 MG: 5 SOLUTION ORAL at 01:01

## 2022-01-20 RX ADMIN — ENOXAPARIN SODIUM 40 MG: 40 INJECTION SUBCUTANEOUS at 05:01

## 2022-01-20 RX ADMIN — ACETAMINOPHEN 650 MG: 325 TABLET ORAL at 05:01

## 2022-01-20 RX ADMIN — SODIUM CHLORIDE 30 MG/ML INHALATION SOLUTION 4 ML: 30 SOLUTION INHALANT at 11:01

## 2022-01-20 RX ADMIN — METOPROLOL TARTRATE 100 MG: 50 TABLET, FILM COATED ORAL at 09:01

## 2022-01-20 RX ADMIN — SENNOSIDES AND DOCUSATE SODIUM 1 TABLET: 50; 8.6 TABLET ORAL at 10:01

## 2022-01-20 RX ADMIN — BACITRACIN 1 EACH: 500 OINTMENT TOPICAL at 10:01

## 2022-01-20 RX ADMIN — GUAIFENESIN 200 MG: 100 SOLUTION ORAL at 03:01

## 2022-01-20 RX ADMIN — SODIUM CHLORIDE 30 MG/ML INHALATION SOLUTION 4 ML: 30 SOLUTION INHALANT at 07:01

## 2022-01-20 RX ADMIN — THIAMINE HCL TAB 100 MG 100 MG: 100 TAB at 09:01

## 2022-01-20 RX ADMIN — CLOPIDOGREL 75 MG: 75 TABLET, FILM COATED ORAL at 09:01

## 2022-01-20 RX ADMIN — GUAIFENESIN 200 MG: 100 SOLUTION ORAL at 01:01

## 2022-01-20 RX ADMIN — Medication 6 MG: at 10:01

## 2022-01-20 RX ADMIN — SODIUM CHLORIDE 30 MG/ML INHALATION SOLUTION 4 ML: 30 SOLUTION INHALANT at 12:01

## 2022-01-20 RX ADMIN — ASPIRIN 81 MG CHEWABLE TABLET 81 MG: 81 TABLET CHEWABLE at 09:01

## 2022-01-20 RX ADMIN — CIPROFLOXACIN 750 MG: KIT at 10:01

## 2022-01-20 RX ADMIN — SODIUM CHLORIDE 30 MG/ML INHALATION SOLUTION 4 ML: 30 SOLUTION INHALANT at 03:01

## 2022-01-20 RX ADMIN — BACITRACIN: 500 OINTMENT TOPICAL at 09:01

## 2022-01-20 RX ADMIN — ATORVASTATIN CALCIUM 20 MG: 20 TABLET, FILM COATED ORAL at 09:01

## 2022-01-20 RX ADMIN — POLYETHYLENE GLYCOL 3350 17 G: 17 POWDER, FOR SOLUTION ORAL at 10:01

## 2022-01-20 RX ADMIN — FOLIC ACID 1 MG: 1 TABLET ORAL at 09:01

## 2022-01-20 RX ADMIN — GUAIFENESIN 200 MG: 100 SOLUTION ORAL at 10:01

## 2022-01-20 NOTE — PLAN OF CARE
POC reviewed with Pt. Pt using written communication board. AAOx4. VSS on 5L 28% trach collar. Tolerated 3 cans of tube feed, denies n/v. Voiding per urinal, adequate UOP. X2 BM, passing gas. Up ambulating to bedside commode with assistance. Q4h flap check. Pt up in chair most of day. Pain managed with scheduled Tylenol. Bed in lowest position, call light within reach. WCTM.     Attempted to page on call for ENT twice regarding bump on pt.'s left arm. Report given to night RN.

## 2022-01-20 NOTE — PT/OT/SLP PROGRESS
Occupational Therapy      Patient Name:  Fareed Richard Jr.   MRN:  [id number]    Patient not seen today secondary to Patient unwilling to participate (Pt attempted at 107pm this afternoon with pt refusing. Pt stated he was too full from previous mealtime for additional movement despite encouragement. RRT entering room upon exit.). Will follow-up pending if pt remains hospitalized (pt with green dot).    Gemma Salvador OT  1/20/2022

## 2022-01-20 NOTE — SUBJECTIVE & OBJECTIVE
Interval History: NAEON. Nursing concerned about bump on patients left arm per notes. Patient without complaints regarding bump.     Medications:  Continuous Infusions:   sodium chloride 0.9% Stopped (01/06/22 0636)     Scheduled Meds:   acetaminophen  650 mg Per G Tube Q6H    aspirin  81 mg Per G Tube Daily    atorvastatin  20 mg Per G Tube Daily    bacitracin zinc   Topical (Top) BID    ciprofloxacin 250 mg/5 ml  750 mg Per G Tube Q12H    clopidogreL  75 mg Per G Tube Daily    enoxaparin  40 mg Subcutaneous Daily    folic acid  1 mg Per G Tube Daily    guaiFENesin 100 mg/5 ml  200 mg Per G Tube Q4H    losartan  50 mg Per G Tube Daily    magnesium citrate  296 mL Per G Tube Once    melatonin  6 mg Per G Tube Nightly    metoprolol tartrate  100 mg Per G Tube BID    polyethylene glycol  17 g Per G Tube BID    senna-docusate 8.6-50 mg  1 tablet Per G Tube BID    sodium chloride 3%  4 mL Nebulization Q8H    thiamine  100 mg Per G Tube Daily     PRN Meds:sodium chloride 0.9%, albuterol-ipratropium, bisacodyL, hydrALAZINE, ondansetron, oxyCODONE, oxyCODONE, sodium chloride 0.9%, sodium chloride 0.9%     Review of patient's allergies indicates:  No Known Allergies  Objective:     Vital Signs (24h Range):  Temp:  [96.4 °F (35.8 °C)-98 °F (36.7 °C)] 96.4 °F (35.8 °C)  Pulse:  [65-91] 73  Resp:  [15-24] 20  SpO2:  [92 %-100 %] 96 %  BP: (108-135)/(58-77) 124/73       Lines/Drains/Airways     Drain                 Gastrostomy/Enterostomy 01/04/22 Percutaneous endoscopic gastrostomy (PEG) LUQ 16 days          Airway                 Surgical Airway 01/04/22 1546 Shiley Cuffed 15 days          Peripheral Intravenous Line                 Peripheral IV - Single Lumen 01/15/22 1952 18 G Anterior;Proximal;Right Forearm 4 days                Physical Exam    Awake and alert, NAD  NC/AT, EOMI  Normal external nose   MMM, rosemary-tongue ALTFF in place with good color that approximates the thigh, good tugar and warm,  strong arterial doppler signal, intra-oral sutures intact with no breakdown and dehiscence  Neck soft, advancement flaps in place with areas of epidermolysis, staples intact  6-0 cuffless trach in place and secured with sherry collar, aerating well  Normal work of breathing, on trach collar, thick secretions  G tube in place  Left arm with small 1-2cm superficial bump at antecubital fossa    Significant Labs:  BMP:   Recent Labs   Lab 01/14/22  0236 01/17/22  0755 01/19/22  0558   GLU 92   < > 104      < > 99   CO2 23   < > 27   BUN 21   < > 11   CREATININE 0.6   < > 0.6   CALCIUM 8.9   < > 9.1   MG 2.1  --   --     < > = values in this interval not displayed.     CBC:   Recent Labs   Lab 01/19/22  0558   WBC 7.73   RBC 2.79*   HGB 8.0*   HCT 26.4*   *   MCV 95   MCH 28.7   MCHC 30.3*     Microbiology Results (last 7 days)     Procedure Component Value Units Date/Time    Culture, Respiratory with Gram Stain [[id number]]  (Abnormal)  (Susceptibility) Collected: 01/14/22 1543    Order Status: Completed Specimen: Respiratory from Tracheal Aspirate Updated: 01/18/22 0843     Respiratory Culture No S aureus isolated.      CITROBACTER KOSERI  Moderate        PSEUDOMONAS AERUGINOSA  Few  Normal respiratory ishmael also present       Gram Stain (Respiratory) <10 epithelial cells per low power field.     Gram Stain (Respiratory) Moderate WBC's     Gram Stain (Respiratory) Many Gram positive cocci      Gram Stain (Respiratory) Few Gram negative rods    Aerobic culture [[id number]]  (Abnormal)  (Susceptibility) Collected: 01/16/22 1704    Order Status: Completed Specimen: Wound from Neck Updated: 01/18/22 0822     Aerobic Bacterial Culture PROTEUS MIRABILIS  Few      Culture, Anaerobe [[id number]] Collected: 01/16/22 1704    Order Status: Completed Specimen: Wound from Neck Updated: 01/18/22 0628     Anaerobic Culture Culture in progress          Significant Diagnostics:  I have reviewed and interpreted all pertinent  imaging results/findings within the past 24 hours.

## 2022-01-20 NOTE — PROGRESS NOTES
[facility]  Otorhinolaryngology-Head & Neck Surgery  Progress Note    Subjective:     Post-Op Info:  Procedure(s) (LRB):  GLOSSECTOMY (N/A)  TRACHEOTOMY (N/A)  EGD, WITH PEG TUBE INSERTION (N/A)  CREATION, FREE FLAP (Right)  DISSECTION, NECK (Bilateral)   16 Days Post-Op  Hospital Day: 18     Interval History: NAEON. Nursing concerned about bump on patients left arm per notes. Patient without complaints regarding bump.     Medications:  Continuous Infusions:   sodium chloride 0.9% Stopped (01/06/22 0636)     Scheduled Meds:   acetaminophen  650 mg Per G Tube Q6H    aspirin  81 mg Per G Tube Daily    atorvastatin  20 mg Per G Tube Daily    bacitracin zinc   Topical (Top) BID    ciprofloxacin 250 mg/5 ml  750 mg Per G Tube Q12H    clopidogreL  75 mg Per G Tube Daily    enoxaparin  40 mg Subcutaneous Daily    folic acid  1 mg Per G Tube Daily    guaiFENesin 100 mg/5 ml  200 mg Per G Tube Q4H    losartan  50 mg Per G Tube Daily    magnesium citrate  296 mL Per G Tube Once    melatonin  6 mg Per G Tube Nightly    metoprolol tartrate  100 mg Per G Tube BID    polyethylene glycol  17 g Per G Tube BID    senna-docusate 8.6-50 mg  1 tablet Per G Tube BID    sodium chloride 3%  4 mL Nebulization Q8H    thiamine  100 mg Per G Tube Daily     PRN Meds:sodium chloride 0.9%, albuterol-ipratropium, bisacodyL, hydrALAZINE, ondansetron, oxyCODONE, oxyCODONE, sodium chloride 0.9%, sodium chloride 0.9%     Review of patient's allergies indicates:  No Known Allergies  Objective:     Vital Signs (24h Range):  Temp:  [96.4 °F (35.8 °C)-98 °F (36.7 °C)] 96.4 °F (35.8 °C)  Pulse:  [65-91] 73  Resp:  [15-24] 20  SpO2:  [92 %-100 %] 96 %  BP: (108-135)/(58-77) 124/73       Lines/Drains/Airways     Drain                 Gastrostomy/Enterostomy 01/04/22 Percutaneous endoscopic gastrostomy (PEG) LUQ 16 days          Airway                 Surgical Airway 01/04/22 1546 Shiley Cuffed 15 days          Peripheral Intravenous  Line                 Peripheral IV - Single Lumen 01/15/22 1952 18 G Anterior;Proximal;Right Forearm 4 days                Physical Exam    Awake and alert, NAD  NC/AT, EOMI  Normal external nose   MMM, rosemary-tongue ALTFF in place with good color that approximates the thigh, good tugar and warm, strong arterial doppler signal, intra-oral sutures intact with no breakdown and dehiscence  Neck soft, advancement flaps in place with areas of epidermolysis, staples intact  6-0 cuffless trach in place and secured with sherry collar, aerating well  Normal work of breathing, on trach collar, thick secretions  G tube in place  Left arm with small 1-2cm superficial bump at antecubital fossa    Significant Labs:  BMP:   Recent Labs   Lab 01/14/22  0236 01/17/22  0755 01/19/22  0558   GLU 92   < > 104      < > 99   CO2 23   < > 27   BUN 21   < > 11   CREATININE 0.6   < > 0.6   CALCIUM 8.9   < > 9.1   MG 2.1  --   --     < > = values in this interval not displayed.     CBC:   Recent Labs   Lab 01/19/22  0558   WBC 7.73   RBC 2.79*   HGB 8.0*   HCT 26.4*   *   MCV 95   MCH 28.7   MCHC 30.3*     Microbiology Results (last 7 days)     Procedure Component Value Units Date/Time    Culture, Respiratory with Gram Stain [[id number]]  (Abnormal)  (Susceptibility) Collected: 01/14/22 1543    Order Status: Completed Specimen: Respiratory from Tracheal Aspirate Updated: 01/18/22 0843     Respiratory Culture No S aureus isolated.      CITROBACTER KOSERI  Moderate        PSEUDOMONAS AERUGINOSA  Few  Normal respiratory ishmael also present       Gram Stain (Respiratory) <10 epithelial cells per low power field.     Gram Stain (Respiratory) Moderate WBC's     Gram Stain (Respiratory) Many Gram positive cocci      Gram Stain (Respiratory) Few Gram negative rods    Aerobic culture [[id number]]  (Abnormal)  (Susceptibility) Collected: 01/16/22 1704    Order Status: Completed Specimen: Wound from Neck Updated: 01/18/22 0822     Aerobic  Bacterial Culture PROTEUS MIRABILIS  Few      Culture, Anaerobe [[id number]] Collected: 01/16/22 170    Order Status: Completed Specimen: Wound from Neck Updated: 01/18/22 0628     Anaerobic Culture Culture in progress          Significant Diagnostics:  I have reviewed and interpreted all pertinent imaging results/findings within the past 24 hours.    Assessment/Plan:     * Squamous cell cancer of tongue  71M with [id number] SCC of the oral tongue s/p total glossectomy, bilateral neck dissections levels I-IV, tracheostomy, and ALTFF reconstruction with G tube placement by general surgery. Resp cx +citrobacter, proteus, and pseudomonas. Doing well. Left arm bump consistent with small hematoma.     -Ciprofloxacin for resp culture   -Consult wound care for recommendations re:epidermolysis of neck   -Continue q4h flap checks  -Continue bolus TFs  -Plavix/ASA  -pain control   -trach teaching with respiratory   -PT/OT, OOB    Ppx: L40, SCDs, NIKITA hose     Dispo: Ready for discharge to rehab with trach and PEG hopefully today             Annmarie Oliveira MD  Otorhinolaryngology-Head & Neck Surgery  [facility]

## 2022-01-20 NOTE — PLAN OF CARE
01/20/22 0848   Post-Acute Status   Post-Acute Authorization Placement   Post-Acute Placement Status Pending post-acute provider review/more information requested   Pt has been approved for rehab at [facility]. DANIELE spoke with Shira at [facility] to inform her of this. She is going to review the transfer orders and will get back to  to set up transport.    UPDATE:9:52 AM  DANIELE received call back from Shira at [facility]. They are currently working on making sure they have an RT on staff for when he gets admitted today. Apparently their usual one is not working today. They are going to keep SW posted. DANIELE informed team.    UPDATE: 2:05 PM   DANIELE left message for Shira to follow up on possible admit today.    UPDATE:3:21 PM  DANIELE spoke with Julia at [facility] ([phone]). She stated they will not know if they have an RT for tomorrow until tomorrow. DANIELE will follow up in the morning.     Kirsten Lutz LMSW  [facility]  [phone]

## 2022-01-20 NOTE — CONSULTS
[facility]  Wound Care    Patient Name:  Fareed Richard Jr.   MRN:  [id number]  Date: 1/20/2022  Diagnosis: Squamous cell cancer of tongue    History:     Past Medical History:   Diagnosis Date    Cancer     skin to Rt forearm and face    COPD (chronic obstructive pulmonary disease)     Hyperlipidemia     Hypertension     Pseudoaneurysm        Social History     Socioeconomic History    Marital status:    Tobacco Use    Smoking status: Former Smoker     Packs/day: 2.00     Years: 40.00     Pack years: 80.00     Types: Cigarettes     Start date: 4/17/1963     Quit date: 4/17/2018     Years since quitting: 3.7    Smokeless tobacco: Never Used    Tobacco comment: 3/3 ppd x 40 yrs. Currently 3-4 cigarettes daily .He is trying  to quit. Is using a Vapor cigarettes  2-3 x's a day.   Substance and Sexual Activity    Alcohol use: Yes     Alcohol/week: 3.0 standard drinks     Types: 3 Cans of beer per week     Comment: beer daily 3-4    Drug use: No    Sexual activity: Not Currently       Precautions:     Allergies as of 12/18/2021    (No Known Allergies)       [facility] Assessment Details/Treatment     Wound consult received per ENT for neck, epidermolysis of skin after advancement of flaps.  S/p total glossectomy, bilateral neck dissections levels I-IV, tracheostomy, and ALTFF reconstruction with G tube placement by general surgery.    Dry brownish crust noted along jaw line with yellow fibrin noted to left medial aspect of neck. Staples noted. No drainage or odor noted. No swelling or erythema noted.   Recommend painting affected area with betadine daily.    Dr. Annmarie Oliveira with ENT approved wound care recommendations. Nursing orders placed. Wound care to assist as needed.

## 2022-01-20 NOTE — ASSESSMENT & PLAN NOTE
71M with [id number] SCC of the oral tongue s/p total glossectomy, bilateral neck dissections levels I-IV, tracheostomy, and ALTFF reconstruction with G tube placement by general surgery. Resp cx +citrobacter, proteus, and pseudomonas. Doing well. Left arm bump consistent with small hematoma.     -Ciprofloxacin for resp culture   -Consult wound care for recommendations re:epidermolysis of neck   -Continue q4h flap checks  -Continue bolus TFs  -Plavix/ASA  -pain control   -trach teaching with respiratory   -PT/OT, OOB    Ppx: L40, SCDs, NIKITA hose     Dispo: Ready for discharge to rehab with trach and PEG hopefully today

## 2022-01-20 NOTE — PT/OT/SLP PROGRESS
Physical Therapy      Patient Name:  Fareed Richard Jr.   MRN:  [id number]    Patient not seen today secondary to  (pt. with abd. discomfort and awaiting possible transfer to Rehab). Will follow-up tomorrow.     Nikita Mckeon, PT  1/20/2022

## 2022-01-20 NOTE — RESPIRATORY THERAPY
RAPID RESPONSE RESPIRATORY THERAPY PROACTIVE NOTE           Time of visit: 833     Code Status: Full Code   : 1950  Bed: [facility]:   MRN: [id number]  Time spent at the bedside: < 15 min    SITUATION    Evaluated patient for: LDA Check     BACKGROUND    Patient has a past medical history of Cancer, COPD (chronic obstructive pulmonary disease), Hyperlipidemia, Hypertension, and Pseudoaneurysm.  Clinically Significant Surgical Hx: tracheostomy    24 Hours Vitals Range:  Temp:  [96.4 °F (35.8 °C)-98 °F (36.7 °C)]   Pulse:  [65-90]   Resp:  [16-24]   BP: (108-135)/(58-77)   SpO2:  [92 %-100 %]     Labs:    Recent Labs     22  0416 22  0558    138   K 3.7 3.8   CL 99 99   CO2 28 27   CREATININE 0.6 0.6   GLU 95 104        No results for input(s): PH, PCO2, PO2, HCO3, POCSATURATED, BE in the last 72 hours.    ASSESSMENT/INTERVENTIONS      Last VS   Temp: 96.8 °F (36 °C) (734)  Pulse: 86 (734)  Resp: 18 (734)  BP: 132/70 (734)  SpO2: 92 % (734)    Level of Consciousness: Level of Consciousness (AVPU): alert  Respiratory Effort: Respiratory Effort: Unlabored,Normal Expansion/Accessory Muscle Usage: Expansion/Accessory Muscles/Retractions: no use of accessory muscles,no retractions,expansion symmetric  All Lung Field Breath Sounds: All Lung Fields Breath Sounds: Anterior:,Lateral:,coarse  DEE Breath Sounds: coarse  LLL Breath Sounds: coarse  RUL Breath Sounds: coarse  RML Breath Sounds: coarse  RLL Breath Sounds: coarse  O2 Device/Concentration: Flow (L/min): 5, Oxygen Concentration (%): 28, O2 Device (Oxygen Therapy): Trach Collar  Surgical airway: Yes, Type: Shiley Size: 6   Ambu at bedside: Ambu bag with the patient?: Yes, Adult Ambu     Active Orders   Respiratory Care    Chest physiotherapy TID     Frequency: TID     Number of Occurrences: Until Specified     Order Questions:      Indications: COPIOUS SPUTUM PRODUCTION    Inhalation Treatment Q4H PRN      Frequency: Q4H PRN     Number of Occurrences: Until Specified    Oxygen Continuous     Frequency: Continuous     Number of Occurrences: Until Specified     Order Comments: Patient uses 2L at night while at home       Order Questions:      Device type: Low flow      Device: Trach Collar      FiO2%: 35      Titrate O2 per Oxygen Titration Protocol: Yes      To maintain SpO2 goal of: >= 90%      Notify MD of: Inability to achieve desired SpO2; Sudden change in patient status and requires 20% increase in FiO2; Patient requires >60% FiO2    Pulse Oximetry Continuous     Frequency: Continuous     Number of Occurrences: Until Specified    Respiratory care evaluation only Q4H     Frequency: Q4H     Number of Occurrences: Until Specified    RESPIRATORY COMMUNICATION     Frequency: Q4H     Number of Occurrences: Until Specified     Order Comments: Trach care and trach teaching      Routine tracheostomy care     Frequency: BID     Number of Occurrences: Until Specified       RECOMMENDATIONS    We recommend: RRT Recs: Continue POC per primary team.    ESCALATION    POC and orders reviewed with bedside RT, ..    FOLLOW-UP    Please call back the Rapid Response RT, Gwen Anand, RRT at x [phone] for any questions or concerns.

## 2022-01-21 PROCEDURE — 27200966 HC CLOSED SUCTION SYSTEM

## 2022-01-21 PROCEDURE — 25000003 PHARM REV CODE 250: Performed by: STUDENT IN AN ORGANIZED HEALTH CARE EDUCATION/TRAINING PROGRAM

## 2022-01-21 PROCEDURE — 92526 ORAL FUNCTION THERAPY: CPT

## 2022-01-21 PROCEDURE — 27000221 HC OXYGEN, UP TO 24 HOURS

## 2022-01-21 PROCEDURE — 25000003 PHARM REV CODE 250

## 2022-01-21 PROCEDURE — 25000242 PHARM REV CODE 250 ALT 637 W/ HCPCS: Performed by: OTOLARYNGOLOGY

## 2022-01-21 PROCEDURE — 20600001 HC STEP DOWN PRIVATE ROOM

## 2022-01-21 PROCEDURE — 94668 MNPJ CHEST WALL SBSQ: CPT

## 2022-01-21 PROCEDURE — 92507 TX SP LANG VOICE COMM INDIV: CPT

## 2022-01-21 PROCEDURE — 99900035 HC TECH TIME PER 15 MIN (STAT)

## 2022-01-21 PROCEDURE — 25000242 PHARM REV CODE 250 ALT 637 W/ HCPCS: Performed by: STUDENT IN AN ORGANIZED HEALTH CARE EDUCATION/TRAINING PROGRAM

## 2022-01-21 PROCEDURE — 25000003 PHARM REV CODE 250: Performed by: NURSE PRACTITIONER

## 2022-01-21 PROCEDURE — 99900026 HC AIRWAY MAINTENANCE (STAT)

## 2022-01-21 PROCEDURE — 63600175 PHARM REV CODE 636 W HCPCS: Performed by: STUDENT IN AN ORGANIZED HEALTH CARE EDUCATION/TRAINING PROGRAM

## 2022-01-21 PROCEDURE — 94761 N-INVAS EAR/PLS OXIMETRY MLT: CPT

## 2022-01-21 PROCEDURE — 25000003 PHARM REV CODE 250: Performed by: OTOLARYNGOLOGY

## 2022-01-21 PROCEDURE — 94640 AIRWAY INHALATION TREATMENT: CPT

## 2022-01-21 RX ADMIN — ENOXAPARIN SODIUM 40 MG: 40 INJECTION SUBCUTANEOUS at 04:01

## 2022-01-21 RX ADMIN — GUAIFENESIN 200 MG: 100 SOLUTION ORAL at 05:01

## 2022-01-21 RX ADMIN — BACITRACIN 1 EACH: 500 OINTMENT TOPICAL at 09:01

## 2022-01-21 RX ADMIN — GUAIFENESIN 200 MG: 100 SOLUTION ORAL at 01:01

## 2022-01-21 RX ADMIN — SODIUM CHLORIDE 30 MG/ML INHALATION SOLUTION 4 ML: 30 SOLUTION INHALANT at 03:01

## 2022-01-21 RX ADMIN — ATORVASTATIN CALCIUM 20 MG: 20 TABLET, FILM COATED ORAL at 08:01

## 2022-01-21 RX ADMIN — FOLIC ACID 1 MG: 1 TABLET ORAL at 08:01

## 2022-01-21 RX ADMIN — CIPROFLOXACIN 750 MG: KIT at 08:01

## 2022-01-21 RX ADMIN — CLOPIDOGREL 75 MG: 75 TABLET, FILM COATED ORAL at 08:01

## 2022-01-21 RX ADMIN — METOPROLOL TARTRATE 100 MG: 50 TABLET, FILM COATED ORAL at 09:01

## 2022-01-21 RX ADMIN — THIAMINE HCL TAB 100 MG 100 MG: 100 TAB at 08:01

## 2022-01-21 RX ADMIN — IPRATROPIUM BROMIDE AND ALBUTEROL SULFATE 3 ML: 2.5; .5 SOLUTION RESPIRATORY (INHALATION) at 11:01

## 2022-01-21 RX ADMIN — CIPROFLOXACIN 750 MG: KIT at 09:01

## 2022-01-21 RX ADMIN — METOPROLOL TARTRATE 100 MG: 50 TABLET, FILM COATED ORAL at 08:01

## 2022-01-21 RX ADMIN — SODIUM CHLORIDE 30 MG/ML INHALATION SOLUTION 4 ML: 30 SOLUTION INHALANT at 07:01

## 2022-01-21 RX ADMIN — GUAIFENESIN 200 MG: 100 SOLUTION ORAL at 06:01

## 2022-01-21 RX ADMIN — ACETAMINOPHEN 650 MG: 325 TABLET ORAL at 11:01

## 2022-01-21 RX ADMIN — GUAIFENESIN 200 MG: 100 SOLUTION ORAL at 09:01

## 2022-01-21 RX ADMIN — LOSARTAN POTASSIUM 50 MG: 50 TABLET, FILM COATED ORAL at 08:01

## 2022-01-21 RX ADMIN — SODIUM CHLORIDE 30 MG/ML INHALATION SOLUTION 4 ML: 30 SOLUTION INHALANT at 11:01

## 2022-01-21 RX ADMIN — ASPIRIN 81 MG CHEWABLE TABLET 81 MG: 81 TABLET CHEWABLE at 08:01

## 2022-01-21 RX ADMIN — BACITRACIN 1 EACH: 500 OINTMENT TOPICAL at 08:01

## 2022-01-21 RX ADMIN — GUAIFENESIN 200 MG: 100 SOLUTION ORAL at 11:01

## 2022-01-21 RX ADMIN — OXYCODONE HYDROCHLORIDE 10 MG: 5 SOLUTION ORAL at 11:01

## 2022-01-21 RX ADMIN — Medication 6 MG: at 09:01

## 2022-01-21 RX ADMIN — ACETAMINOPHEN 650 MG: 325 TABLET ORAL at 06:01

## 2022-01-21 NOTE — SUBJECTIVE & OBJECTIVE
Interval History: NAEON. No complaints.     Medications:  Continuous Infusions:   sodium chloride 0.9% Stopped (01/06/22 0636)     Scheduled Meds:   acetaminophen  650 mg Per G Tube Q6H    aspirin  81 mg Per G Tube Daily    atorvastatin  20 mg Per G Tube Daily    bacitracin zinc   Topical (Top) BID    ciprofloxacin 250 mg/5 ml  750 mg Per G Tube Q12H    clopidogreL  75 mg Per G Tube Daily    enoxaparin  40 mg Subcutaneous Daily    folic acid  1 mg Per G Tube Daily    guaiFENesin 100 mg/5 ml  200 mg Per G Tube Q4H    losartan  50 mg Per G Tube Daily    magnesium citrate  296 mL Per G Tube Once    melatonin  6 mg Per G Tube Nightly    metoprolol tartrate  100 mg Per G Tube BID    polyethylene glycol  17 g Per G Tube BID    senna-docusate 8.6-50 mg  1 tablet Per G Tube BID    sodium chloride 3%  4 mL Nebulization Q8H    thiamine  100 mg Per G Tube Daily     PRN Meds:sodium chloride 0.9%, albuterol-ipratropium, bisacodyL, hydrALAZINE, ondansetron, oxyCODONE, oxyCODONE, sodium chloride 0.9%, sodium chloride 0.9%     Review of patient's allergies indicates:  No Known Allergies  Objective:     Vital Signs (24h Range):  Temp:  [96.2 °F (35.7 °C)-98.7 °F (37.1 °C)] 97.7 °F (36.5 °C)  Pulse:  [69-87] 82  Resp:  [14-24] 20  SpO2:  [91 %-100 %] 93 %  BP: (115-134)/(56-76) 118/74     Date 01/21/22 0700 - 01/22/22 0659   Shift 5704-5739 1668-8702 8549-5248 24 Hour Total   INTAKE   Shift Total(mL/kg)       OUTPUT   Urine(mL/kg/hr) 100   100   Shift Total(mL/kg) 100(1.5)   100(1.5)   Weight (kg) 66.9 66.9 66.9 66.9     Lines/Drains/Airways     Drain                 Gastrostomy/Enterostomy 01/04/22 Percutaneous endoscopic gastrostomy (PEG) LUQ 17 days          Airway                 Surgical Airway 01/04/22 1546 Shiley Cuffed 16 days          Peripheral Intravenous Line                 Peripheral IV - Single Lumen 01/15/22 1952 18 G Anterior;Proximal;Right Forearm 5 days                Physical Exam    Awake and  alert, NAD  NC/AT, EOMI  Normal external nose   MMM, rosemary-tongue ALTFF in place with good color that approximates the thigh, good tugar and warm, strong arterial doppler signal, intra-oral sutures intact with no breakdown and dehiscence  Neck soft, advancement flaps in place with areas of epidermolysis, staples intact  6-0 cuffless trach in place and secured with sherry collar, aerating well  Normal work of breathing, on trach collar, thick secretions  G tube in place  Left arm with small 1-2cm superficial bump at antecubital fossa    Significant Labs:  BMP:   Recent Labs   Lab 01/19/22  0558      CL 99   CO2 27   BUN 11   CREATININE 0.6   CALCIUM 9.1     CBC:   Recent Labs   Lab 01/19/22  0558   WBC 7.73   RBC 2.79*   HGB 8.0*   HCT 26.4*   *   MCV 95   MCH 28.7   MCHC 30.3*     Microbiology Results (last 7 days)     Procedure Component Value Units Date/Time    Culture, Anaerobe [[id number]] Collected: 01/16/22 1704    Order Status: Completed Specimen: Wound from Neck Updated: 01/20/22 0952     Anaerobic Culture Culture in progress    Culture, Respiratory with Gram Stain [[id number]]  (Abnormal)  (Susceptibility) Collected: 01/14/22 1543    Order Status: Completed Specimen: Respiratory from Tracheal Aspirate Updated: 01/18/22 0843     Respiratory Culture No S aureus isolated.      CITROBACTER KOSERI  Moderate        PSEUDOMONAS AERUGINOSA  Few  Normal respiratory ishmael also present       Gram Stain (Respiratory) <10 epithelial cells per low power field.     Gram Stain (Respiratory) Moderate WBC's     Gram Stain (Respiratory) Many Gram positive cocci      Gram Stain (Respiratory) Few Gram negative rods    Aerobic culture [[id number]]  (Abnormal)  (Susceptibility) Collected: 01/16/22 1704    Order Status: Completed Specimen: Wound from Neck Updated: 01/18/22 0822     Aerobic Bacterial Culture PROTEUS MIRABILIS  Few            Significant Diagnostics:  I have reviewed and interpreted all pertinent imaging  results/findings within the past 24 hours.

## 2022-01-21 NOTE — PLAN OF CARE
Problem: SLP Goal  Goal: SLP Goal  Description: Speech Language Pathology Goals  Goals expected to be met by 1/26    1. Pt will tolerate PMSV for 5 minutes with >92% spO2 to increase phonation, respiration and swallowing aspects  2. Pt/family will don/doff PMSV x1 during session with min cues  3. Pt will vocalize with PMSV in place to increase verbal expression.   4. Pt will participate in ongoing swallow assessment in order to determine safest least restrictive diet.        Outcome: Ongoing, Progressing   Continue POC at this time, all goals remain appropriate.   1/21/2022

## 2022-01-21 NOTE — PLAN OF CARE
01/21/22 1023   Post-Acute Status   Post-Acute Authorization Placement   Post-Acute Placement Status Pending Bed Availability   DANIELE spoke with Shira at [facility]. She should know if they have an RT on staff within the hour and will let DANIELE know.      Kirsten Lutz, KENDALL  [facility]  [phone]

## 2022-01-21 NOTE — PROGRESS NOTES
[facility]  Otorhinolaryngology-Head & Neck Surgery  Progress Note    Subjective:     Post-Op Info:  Procedure(s) (LRB):  GLOSSECTOMY (N/A)  TRACHEOTOMY (N/A)  EGD, WITH PEG TUBE INSERTION (N/A)  CREATION, FREE FLAP (Right)  DISSECTION, NECK (Bilateral)   17 Days Post-Op  Hospital Day: 19     Interval History: NAEON. No complaints.     Medications:  Continuous Infusions:   sodium chloride 0.9% Stopped (01/06/22 0636)     Scheduled Meds:   acetaminophen  650 mg Per G Tube Q6H    aspirin  81 mg Per G Tube Daily    atorvastatin  20 mg Per G Tube Daily    bacitracin zinc   Topical (Top) BID    ciprofloxacin 250 mg/5 ml  750 mg Per G Tube Q12H    clopidogreL  75 mg Per G Tube Daily    enoxaparin  40 mg Subcutaneous Daily    folic acid  1 mg Per G Tube Daily    guaiFENesin 100 mg/5 ml  200 mg Per G Tube Q4H    losartan  50 mg Per G Tube Daily    magnesium citrate  296 mL Per G Tube Once    melatonin  6 mg Per G Tube Nightly    metoprolol tartrate  100 mg Per G Tube BID    polyethylene glycol  17 g Per G Tube BID    senna-docusate 8.6-50 mg  1 tablet Per G Tube BID    sodium chloride 3%  4 mL Nebulization Q8H    thiamine  100 mg Per G Tube Daily     PRN Meds:sodium chloride 0.9%, albuterol-ipratropium, bisacodyL, hydrALAZINE, ondansetron, oxyCODONE, oxyCODONE, sodium chloride 0.9%, sodium chloride 0.9%     Review of patient's allergies indicates:  No Known Allergies  Objective:     Vital Signs (24h Range):  Temp:  [96.2 °F (35.7 °C)-98.7 °F (37.1 °C)] 97.7 °F (36.5 °C)  Pulse:  [69-87] 82  Resp:  [14-24] 20  SpO2:  [91 %-100 %] 93 %  BP: (115-134)/(56-76) 118/74     Date 01/21/22 0700 - 01/22/22 0659   Shift 0978-2065 3494-9871 7293-1335 24 Hour Total   INTAKE   Shift Total(mL/kg)       OUTPUT   Urine(mL/kg/hr) 100   100   Shift Total(mL/kg) 100(1.5)   100(1.5)   Weight (kg) 66.9 66.9 66.9 66.9     Lines/Drains/Airways     Drain                 Gastrostomy/Enterostomy 01/04/22 Percutaneous  endoscopic gastrostomy (PEG) LUQ 17 days          Airway                 Surgical Airway 01/04/22 1546 Shiley Cuffed 16 days          Peripheral Intravenous Line                 Peripheral IV - Single Lumen 01/15/22 1952 18 G Anterior;Proximal;Right Forearm 5 days                Physical Exam    Awake and alert, NAD  NC/AT, EOMI  Normal external nose   MMM, rosemary-tongue ALTFF in place with good color that approximates the thigh, good tugar and warm, strong arterial doppler signal, intra-oral sutures intact with no breakdown and dehiscence  Neck soft, advancement flaps in place with areas of epidermolysis, staples intact  6-0 cuffless trach in place and secured with sherry collar, aerating well  Normal work of breathing, on trach collar, thick secretions  G tube in place  Left arm with small 1-2cm superficial bump at antecubital fossa    Significant Labs:  BMP:   Recent Labs   Lab 01/19/22  0558      CL 99   CO2 27   BUN 11   CREATININE 0.6   CALCIUM 9.1     CBC:   Recent Labs   Lab 01/19/22  0558   WBC 7.73   RBC 2.79*   HGB 8.0*   HCT 26.4*   *   MCV 95   MCH 28.7   MCHC 30.3*     Microbiology Results (last 7 days)     Procedure Component Value Units Date/Time    Culture, Anaerobe [[id number]] Collected: 01/16/22 1704    Order Status: Completed Specimen: Wound from Neck Updated: 01/20/22 0952     Anaerobic Culture Culture in progress    Culture, Respiratory with Gram Stain [[id number]]  (Abnormal)  (Susceptibility) Collected: 01/14/22 1543    Order Status: Completed Specimen: Respiratory from Tracheal Aspirate Updated: 01/18/22 0843     Respiratory Culture No S aureus isolated.      CITROBACTER KOSERI  Moderate        PSEUDOMONAS AERUGINOSA  Few  Normal respiratory ishmael also present       Gram Stain (Respiratory) <10 epithelial cells per low power field.     Gram Stain (Respiratory) Moderate WBC's     Gram Stain (Respiratory) Many Gram positive cocci      Gram Stain (Respiratory) Few Gram negative rods     Aerobic culture [[id number]]  (Abnormal)  (Susceptibility) Collected: 01/16/22 1704    Order Status: Completed Specimen: Wound from Neck Updated: 01/18/22 0822     Aerobic Bacterial Culture PROTEUS MIRABILIS  Few            Significant Diagnostics:  I have reviewed and interpreted all pertinent imaging results/findings within the past 24 hours.    Assessment/Plan:     * Squamous cell cancer of tongue  71M with [id number] SCC of the oral tongue s/p total glossectomy, bilateral neck dissections levels I-IV, tracheostomy, and ALTFF reconstruction with G tube placement by general surgery.     -Ciprofloxacin for resp culture   -Continue q4h flap checks  -Continue bolus TFs  -Plavix/ASA  -pain control   -trach teaching with respiratory   -PT/OT, OOB    Ppx: L40, SCDs, NIKITA hose     Dispo: Discharge to rehab Monday             Annmarie Oliveira MD  Otorhinolaryngology-Head & Neck Surgery  [facility]

## 2022-01-21 NOTE — PHYSICIAN QUERY
PT Name: Fareed Richard Jr.  MR #: [id number]     DOCUMENTATION CLARIFICATION     CDS/: Neyda MAIN, RN              Contact information: [email or web address]  This form is a permanent document in the medical record.     Query Date: January 21, 2022    By submitting this query, we are merely seeking further clarification of documentation.  Please utilize your independent clinical judgment when addressing the question(s) below.    The Medical Record contains the following:   Indicators   Supporting Clinical Findings Location in Medical Record   x Non-blanchable erythema/redness  -Nonblanchable redness to sacrum  RN Plan of Care 1/21/22    Ulcer/Injury/Skin Breakdown      Deep Tissue Injury      Wound care consult      Acute/Chronic Illness     x Medication/Treatment   barrier ointment applied as needed  RN Plan of Care 1/21/22   x Other  Nutrition Problem:  Severe Protein-Calorie Malnutrition    Nutrition consult 1/5/22     The clinical guidelines noted are only a system guideline. It does not replace the providers clinical judgment.    Per the National Pressure Injury Advisory Panel:   A pressure injury is localized damage to the skin and underlying soft tissue usually over a bony prominence or related to a medical or other device. The injury can present as intact skin or an open ulcer and may be painful. The injury occurs as a result of intense and/or prolonged pressure or pressure in combination with shear. The tolerance of soft tissue for pressure and shear may also be affected by microclimate, nutrition, perfusion, co-morbidities and condition of the soft tissue.     Stage 1 Pressure Injury:  Intact skin with a localized area of non-blanchable erythema, which may appear differently in darkly pigmented skin. Color changes do not include purple or maroon discoloration; these may indicate deep tissue pressure injury.    Stage 2 Pressure Injury:  Partial-thickness loss of skin with exposed dermis. The  wound bed is viable, pink or red, moist, and may also present as an intact or ruptured serum-filled blister.    Stage 3 Pressure Injury:  Full-thickness loss of skin, in which adipose (fat) is visible in the ulcer and granulation tissue and epibole (rolled wound edges) are often present. Slough and/or eschar may be visible. Undermining and tunneling may occur.    Stage 4 Pressure Injury:  Full-thickness skin and tissue loss with exposed or directly palpable fascia, muscle, tendon, ligament, cartilage or bone in the ulcer. Slough and/or eschar may be visible. Epibole (rolled edges), undermining and/or tunneling often occur.    Unstageable Pressure Injury:  Full-thickness skin and tissue loss in which the extent of tissue damage within the ulcer cannot be confirmed because it is obscured by slough or eschar. If slough or eschar is removed, a Stage 3 or Stage 4 pressure injury will be revealed.    Deep Tissue Pressure Injury:  Intact or non-intact skin with localized area of persistent non-blanchable deep red, maroon, purple discoloration or epidermal separation revealing a dark wound bed or blood filled blister. This injury results from intense and/or prolonged pressure and shear forces at the bone-muscle interface. The wound may evolve rapidly to reveal the actual extent of tissue injury, or may resolve without tissue loss. If necrotic tissue, subcutaneous tissue, granulation tissue, fascia, muscle or other underlying structures are visible, this indicates a full thickness pressure injury (Unstageable, Stage 3 or Stage 4). Do not use DTPI to describe vascular, traumatic, neuropathic, or dermatologic conditions.       Provider, please provide the integumentary diagnosis related to the documentation of sacrum:     [  x ]  Pressure Injury/Decubitus Ulcer, Stage 1     [   ]  Other Integumentary Diagnosis (please specify):______________     [  ]  Clinically Undetermined       Please document in your progress notes daily  for the duration of treatment until resolved and include in your discharge summary.    Reference:    AMEE Mary., SHANNAN Ott., Goldberg, M., AMEE Vee, AMEE Tubbs, & MAYTE Fernandez (2016). Revised National Pressure Ulcer Advisory Panel Pressure Injury Staging System: Revised Pressure Injury Staging System. J Wound Ostomy Continence Nurs, 43(6), 585-597. doi:10.1097/won.7506895484046663    Form No.[id number]

## 2022-01-21 NOTE — PT/OT/SLP PROGRESS
Physical Therapy      Patient Name:  Fareed Richard Jr.   MRN:  [id number]    Patient not seen today secondary to  (pt. with c/o pain). Will follow-up over weekend.    Nikita Mckeon, PT  1/21/2022

## 2022-01-21 NOTE — PLAN OF CARE
POC reviewed w/ pt, verbalized understanding. Pt AAOx4. VSS on 5L 28% TC,  >95%, NS on tele.    -No c/o pain  -#6 shiley trach  -Thick secretions from trach, pt able to cough up, minimal suctioning needed overnight  -Bilat neck incisions w/ staples, and scabs, NELLY; bacitracin ointment per MAR  -R thigh incisions w/ staples, NELLY, CDI  -Multiple skin tears to L arm covered w/ foam  -Nonblanchable redness to sacrum, barrier ointment applied as needed  -Pt voids per urinal w/ adequate UOP overnight   -Pt NPO, G tube clamped w/ TF per can & water boluses per order  -Flap checks q4 WNL  -Communicates per dry erase board  -Pt denies any N/V overnight.   -Pt slept b/w care       Frequent rounds made for pt safety. Call light in reach. Bed in lowest position and locked. Will continue to manage POC.     Problem: Adult Inpatient Plan of Care  Goal: Plan of Care Review  Outcome: Ongoing, Progressing  Goal: Patient-Specific Goal (Individualized)  Outcome: Ongoing, Progressing  Goal: Absence of Hospital-Acquired Illness or Injury  Outcome: Ongoing, Progressing  Goal: Optimal Comfort and Wellbeing  Outcome: Ongoing, Progressing  Goal: Readiness for Transition of Care  Outcome: Ongoing, Progressing     Problem: Infection  Goal: Absence of Infection Signs and Symptoms  Outcome: Ongoing, Progressing     Problem: Fall Injury Risk  Goal: Absence of Fall and Fall-Related Injury  Outcome: Ongoing, Progressing     Problem: Skin Injury Risk Increased  Goal: Skin Health and Integrity  Outcome: Ongoing, Progressing     Problem: Impaired Wound Healing  Goal: Optimal Wound Healing  Outcome: Ongoing, Progressing     Problem: Pain Acute  Goal: Acceptable Pain Control and Functional Ability  Outcome: Ongoing, Progressing     Problem: Activity Intolerance  Goal: Enhanced Capacity and Energy  Outcome: Ongoing, Progressing

## 2022-01-21 NOTE — RESPIRATORY THERAPY
RAPID RESPONSE RESPIRATORY THERAPY PROACTIVE NOTE           Time of visit: 1108     Code Status: Full Code   : 1950  Bed: [address] [place] [address] A:   MRN: [id number]  Time spent at the bedside: < 15 min    SITUATION    Evaluated patient for: LDA Check     BACKGROUND    Patient has a past medical history of Cancer, COPD (chronic obstructive pulmonary disease), Hyperlipidemia, Hypertension, and Pseudoaneurysm.  Clinically Significant Surgical Hx: tracheostomy    24 Hours Vitals Range:  Temp:  [96.2 °F (35.7 °C)-98.7 °F (37.1 °C)]   Pulse:  [69-87]   Resp:  [14-24]   BP: (115-134)/(56-76)   SpO2:  [91 %-100 %]     Labs:    Recent Labs     22  0558      K 3.8   CL 99   CO2 27   CREATININE 0.6           No results for input(s): PH, PCO2, PO2, HCO3, POCSATURATED, BE in the last 72 hours.    ASSESSMENT/INTERVENTIONS    Upon arrival in room suuplies visible at bedside.    Last VS   Temp: 97.7 °F (36.5 °C) (721)  Pulse: 82 (721)  Resp: 20 (721)  BP: 118/74 (721)  SpO2: 93 % (721)    Level of Consciousness: Level of Consciousness (AVPU): alert  Respiratory Effort: Respiratory Effort: Unlabored,Normal Expansion/Accessory Muscle Usage: Expansion/Accessory Muscles/Retractions: no use of accessory muscles,no retractions,expansion symmetric  All Lung Field Breath Sounds: All Lung Fields Breath Sounds: Anterior:,Lateral:,coarse  DEE Breath Sounds: coarse  LLL Breath Sounds: coarse  RUL Breath Sounds: coarse  RML Breath Sounds: coarse  RLL Breath Sounds: coarse  O2 Device/Concentration: Flow (L/min): 5, Oxygen Concentration (%): 28, O2 Device (Oxygen Therapy): tracheostomy collar  Surgical airway: Yes, Type: Shiley Size: 6   Ambu at bedside: Ambu bag with the patient?: Yes, Adult Ambu     Active Orders   Respiratory Care    Chest physiotherapy TID     Frequency: TID     Number of Occurrences: Until Specified     Order Questions:      Indications: COPIOUS SPUTUM PRODUCTION    Inhalation  Treatment Q4H PRN     Frequency: Q4H PRN     Number of Occurrences: Until Specified    Oxygen Continuous     Frequency: Continuous     Number of Occurrences: Until Specified     Order Comments: Patient uses 2L at night while at home       Order Questions:      Device type: Low flow      Device: Trach Collar      FiO2%: 35      Titrate O2 per Oxygen Titration Protocol: Yes      To maintain SpO2 goal of: >= 90%      Notify MD of: Inability to achieve desired SpO2; Sudden change in patient status and requires 20% increase in FiO2; Patient requires >60% FiO2    Pulse Oximetry Continuous     Frequency: Continuous     Number of Occurrences: Until Specified    Respiratory care evaluation only Q4H     Frequency: Q4H     Number of Occurrences: Until Specified    RESPIRATORY COMMUNICATION     Frequency: Q4H     Number of Occurrences: Until Specified     Order Comments: Trach care and trach teaching      Routine tracheostomy care     Frequency: BID     Number of Occurrences: Until Specified       RECOMMENDATIONS    We recommend: RRT Recs: Continue POC per primary team.    ESCALATION    FOLLOW-UP    Please call back the Rapid Response RT, Noemy May, RRT at x [phone] for any questions or concerns.

## 2022-01-21 NOTE — PT/OT/SLP PROGRESS
"Speech Language Pathology Treatment    Patient Name:  Fareed Richard Jr.   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   Yes  Keep patient NPO? Yes     Passy Jaimie Speaking Valve  Trach size/type: Shiley 6 cuffless  O2 sats at rest:98  O2 sats with PMSV in place:97%  Vocal quality with valve in place:adequate intensity, pt was ~60% intelligible  Back pressure upon removal: none  Minutes PMSV worn:Pt tolerated valve throughout session  Education topics addressed: cleaning valve, one-way technology, anatomy of trach, precautions with cuffed trach, removal of valve prior to sleeping    Ice chip trials tolerated x2 with decreased bolus control, questionable intermittent swallow initiation and wet vocal quality post trials. Pt able to clear vocal quality with throat clear. Pt with significant coughing with copious thin secretions noted from trach hub. SLP changed gauze post cough. Pt stated "They just fed me. That's the tube feed." about secretions from trach hub. No further PO trials provided.       Assessment:     Fareed Richard Jr. is a 71 y.o. male with an SLP diagnosis of Dysphagia and One Way Speaking Valve Training. .    Goals:   Multidisciplinary Problems     SLP Goals        Problem: SLP Goal    Goal Priority Disciplines Outcome   SLP Goal     SLP Ongoing, Progressing   Description: Speech Language Pathology Goals  Goals expected to be met by 1/26    1. Pt will " tolerate PMSV for 5 minutes with >92% spO2 to increase phonation, respiration and swallowing aspects  2. Pt/family will don/doff PMSV x1 during session with min cues  3. Pt will vocalize with PMSV in place to increase verbal expression.   4. Pt will participate in ongoing swallow assessment in order to determine safest least restrictive diet.                         Plan:     · Patient to be seen:  3 x/week   · Plan of Care expires:  02/01/22  · Plan of Care reviewed with:  patient   · SLP Follow-Up:  Yes       Discharge recommendations:  rehabilitation facility       Time Tracking:     SLP Treatment Date:   01/21/22  Speech Start Time:  0826  Speech Stop Time:  0839     Speech Total Time (min):  13 min    Billable Minutes: Speech Therapy Individual 6 and Treatment Swallowing Dysfunction 7    01/21/2022

## 2022-01-21 NOTE — PROGRESS NOTES
[facility]  Adult Nutrition  Progress Note    SUMMARY       Recommendations    1. Continue TF of Impact Peptide 1.5 bolus 5 cans/day - 1875 kcal, 118 g protein, and 965 mL free water      2. TF for discharge: Isosource 1.5 advancing as tolerated until goal of 50 mL/hr providing pt with 1800 kcal, 82 g protein, and 917 mL free water               - Bolus: Isosource 1.5 5 cans/day - 1875 kcal, 85 g protein, and 955 mL free water      3. Monitor and follow-up    Goals: Pt to tolerate TF @ goal  Nutrition Goal Status: goal met  Communication of RD Recs:  (POC)    Assessment and Plan    Nutrition Problem:  Severe Protein-Calorie Malnutrition  Malnutrition in the context of Chronic Illness/Injury     Related to (etiology):  Inability to consume sufficient energy     Signs and Symptoms (as evidenced by):  Energy Intake: </= 75% of estimated energy   Body Fat Depletion: moderate depletion of orbitals and triceps   Muscle Mass Depletion: moderate and severe depletion of temples, clavicle region, scapular region, interosseous muscle and lower extremities   Weight Loss: 14% x 1 month  Fluid Accumulation: mild     Interventions(treatment strategy):  Collaboration of nutrition care with other providers  Enteral nutrition     Nutrition Diagnosis Status:  Continues     Malnutrition Assessment  Malnutrition Type: chronic illness  Skin (Micronutrient): bruised       Weight Loss (Malnutrition): greater than 5% in 1 month  Energy Intake (Malnutrition): less than or equal to 75% for greater than or equal to 1 month   Orbital Region (Subcutaneous Fat Loss): moderate depletion  Upper Arm Region (Subcutaneous Fat Loss): moderate depletion   [place] Region (Muscle Loss): severe depletion  Clavicle Bone Region (Muscle Loss): severe depletion  Clavicle and Acromion Bone Region (Muscle Loss): severe depletion  Scapular Bone Region (Muscle Loss): moderate depletion  Dorsal Hand (Muscle Loss): severe depletion  Patellar Region (Muscle  "Loss): severe depletion  Anterior Thigh Region (Muscle Loss): severe depletion  Posterior Calf Region (Muscle Loss): severe depletion   Edema (Fluid Accumulation): 2-->mild             Reason for Assessment    Reason For Assessment: RD follow-up  Diagnosis: cancer diagnosis/related complications  Relevant Medical History: squamous cell carcinoma, HTN, CAD s/p stents, HLD, COPD  Interdisciplinary Rounds: did not attend  General Information Comments: S/p glossectomy/neck dissection/free flap/ trach/PEG 1/4. Pt tolerating bolus TF, but only taking 3 cans/day which is meeting <75% EEN. NFPE completed 1/12; moderate-severe wasting seen. Pt continues with severe malnutrition in the context of chronic illness per ASPEN guidelines. Plan to discharge Monday.   Nutrition Discharge Planning: Adequate nutrition via TF through PEG    Nutrition Risk Screen    Nutrition Risk Screen: tube feeding or parenteral nutrition    Nutrition/Diet History    Patient Reported Diet/Restrictions/Preferences: general  Spiritual, Cultural Beliefs, Amish Practices, Values that Affect Care: no  Food Allergies: NKFA  Factors Affecting Nutritional Intake: difficulty/impaired swallowing,NPO    Anthropometrics    Temp: 97 °F (36.1 °C)  Height: 5' 8" (172.7 cm)  Height (inches): 68 in  Weight Method: Bed Scale  Weight: 66.9 kg (147 lb 7.8 oz)  Weight (lb): 147.49 lb  Ideal Body Weight (IBW), Male: 154 lb  % Ideal Body Weight, Male (lb): 82.6 %  BMI (Calculated): 22.4  BMI Grade: 18.5-24.9 - normal  Weight Loss: unintentional     Lab/Procedures/Meds    Pertinent Labs Reviewed: reviewed  Pertinent Labs Comments: WNL  Pertinent Medications Reviewed: reviewed  Pertinent Medications Comments: acetaminophen, aspirin, statin, folic acid, losartan, melatonin, metoprolol, polyethylene glycol, senna-docusate, thiamine     Estimated/Assessed Needs    Weight Used For Calorie Calculations: 66.9 kg (147 lb 7.8 oz)  Energy Calorie Requirements (kcal): 0202-4180 " kcal/day  Energy Need Method: Kcal/kg (25-30)  Protein Requirements:  g/day (1.2-1.5 g/day)  Weight Used For Protein Calculations: 66.9 kg (147 lb 7.8 oz)  Fluid Requirements (mL): 1 mL/kcal or per MD  Estimated Fluid Requirement Method: RDA Method  RDA Method (mL): 1673     Nutrition Prescription Ordered    Current Diet Order: NPO  Current Nutrition Support Formula Ordered: Impact Peptide 1.5  Current Nutrition Support Frequency Ordered: 5 cans/day    Evaluation of Received Nutrient/Fluid Intake    Enteral Calories (kcal): 1875  Enteral Protein (gm): 118  Enteral (Free Water) Fluid (mL): 965  % Kcal Needs: 100  % Protein Needs: 112  I/O: +1.058L since 1/7  Energy Calories Required: meeting needs  Protein Required: meeting needs  Fluid Required:  (POC)  Comments: LBM 1/20  Tolerance: tolerating  % Intake of Estimated Energy Needs: 50 - 75 %  % Meal Intake: NPO    Nutrition Risk    Level of Risk/Frequency of Follow-up: low     Monitor and Evaluation    Food and Nutrient Intake: energy intake,enteral nutrition intake  Food and Nutrient Adminstration: enteral and parenteral nutrition administration  Anthropometric Measurements: weight,weight change  Biochemical Data, Medical Tests and Procedures: electrolyte and renal panel,gastrointestinal profile,glucose/endocrine profile,inflammatory profile,lipid profile  Nutrition-Focused Physical Findings: overall appearance,extremities, muscles and bones,skin     Nutrition Follow-Up    RD Follow-up?: Yes

## 2022-01-21 NOTE — ASSESSMENT & PLAN NOTE
71M with [id number] SCC of the oral tongue s/p total glossectomy, bilateral neck dissections levels I-IV, tracheostomy, and ALTFF reconstruction with G tube placement by general surgery.     -Ciprofloxacin for resp culture   -Continue q4h flap checks  -Continue bolus TFs  -Plavix/ASA  -pain control   -trach teaching with respiratory   -PT/OT, OOB    Ppx: L40, ELKEs, NIKITA hose     Dispo: Discharge to rehab Monday

## 2022-01-21 NOTE — PLAN OF CARE
Plan of care reviewed with opt:  -AAOx4, written communication  -Trach #6 Shievens in place, on 5L 28% with SpO2 at 92-97%, coughed up moderate amount thick white secretion and required to be deep suction x4 during shift  -VS stable  -Juan Carlos neck incision with staples, flap checked q4h WDL, right thigh with staples CDI.   -PEG tube with tube feed bolus , pt only allow staff to give him total  3 cans of food, water bolus givenx3  -complained of neck pain, pain under control with scheduled pain meds  -Had 2 BMs with loose stool. Adequate amount of benito color urine  -Up on the chair the whole shift  -Call light in reach. Bed alarm ON. WCTM  -Order for discharge pt to SNF received this morning, but per DANIELE, pt cannot transfer there yet due to their staffing issue. DANIELE notified team

## 2022-01-21 NOTE — PLAN OF CARE
Recommendations    1. Continue TF of Impact Peptide 1.5 bolus 5 cans/day - 1875 kcal, 118 g protein, and 965 mL free water      2. TF for discharge: Isosource 1.5 advancing as tolerated until goal of 50 mL/hr providing pt with 1800 kcal, 82 g protein, and 917 mL free water               - Bolus: Isosource 1.5 5 cans/day - 1875 kcal, 85 g protein, and 955 mL free water      3. Monitor and follow-up    Goals: Pt to tolerate TF @ goal  Nutrition Goal Status: goal met

## 2022-01-22 PROCEDURE — 97116 GAIT TRAINING THERAPY: CPT

## 2022-01-22 PROCEDURE — 25000003 PHARM REV CODE 250: Performed by: STUDENT IN AN ORGANIZED HEALTH CARE EDUCATION/TRAINING PROGRAM

## 2022-01-22 PROCEDURE — 25000003 PHARM REV CODE 250: Performed by: NURSE PRACTITIONER

## 2022-01-22 PROCEDURE — 25000242 PHARM REV CODE 250 ALT 637 W/ HCPCS: Performed by: OTOLARYNGOLOGY

## 2022-01-22 PROCEDURE — 20600001 HC STEP DOWN PRIVATE ROOM

## 2022-01-22 PROCEDURE — 97535 SELF CARE MNGMENT TRAINING: CPT

## 2022-01-22 PROCEDURE — 94640 AIRWAY INHALATION TREATMENT: CPT

## 2022-01-22 PROCEDURE — 25000242 PHARM REV CODE 250 ALT 637 W/ HCPCS: Performed by: STUDENT IN AN ORGANIZED HEALTH CARE EDUCATION/TRAINING PROGRAM

## 2022-01-22 PROCEDURE — 25000003 PHARM REV CODE 250

## 2022-01-22 PROCEDURE — 94668 MNPJ CHEST WALL SBSQ: CPT

## 2022-01-22 PROCEDURE — 63600175 PHARM REV CODE 636 W HCPCS: Performed by: STUDENT IN AN ORGANIZED HEALTH CARE EDUCATION/TRAINING PROGRAM

## 2022-01-22 PROCEDURE — 99900026 HC AIRWAY MAINTENANCE (STAT)

## 2022-01-22 PROCEDURE — 27000221 HC OXYGEN, UP TO 24 HOURS

## 2022-01-22 PROCEDURE — 99900035 HC TECH TIME PER 15 MIN (STAT)

## 2022-01-22 PROCEDURE — 94761 N-INVAS EAR/PLS OXIMETRY MLT: CPT

## 2022-01-22 PROCEDURE — 25000003 PHARM REV CODE 250: Performed by: OTOLARYNGOLOGY

## 2022-01-22 PROCEDURE — 97530 THERAPEUTIC ACTIVITIES: CPT

## 2022-01-22 RX ADMIN — CLOPIDOGREL 75 MG: 75 TABLET, FILM COATED ORAL at 08:01

## 2022-01-22 RX ADMIN — ACETAMINOPHEN 650 MG: 325 TABLET ORAL at 06:01

## 2022-01-22 RX ADMIN — ACETAMINOPHEN 650 MG: 325 TABLET ORAL at 12:01

## 2022-01-22 RX ADMIN — SODIUM CHLORIDE 30 MG/ML INHALATION SOLUTION 4 ML: 30 SOLUTION INHALANT at 04:01

## 2022-01-22 RX ADMIN — ENOXAPARIN SODIUM 40 MG: 40 INJECTION SUBCUTANEOUS at 04:01

## 2022-01-22 RX ADMIN — IPRATROPIUM BROMIDE AND ALBUTEROL SULFATE 3 ML: 2.5; .5 SOLUTION RESPIRATORY (INHALATION) at 07:01

## 2022-01-22 RX ADMIN — BACITRACIN 1 EACH: 500 OINTMENT TOPICAL at 09:01

## 2022-01-22 RX ADMIN — FOLIC ACID 1 MG: 1 TABLET ORAL at 08:01

## 2022-01-22 RX ADMIN — METOPROLOL TARTRATE 100 MG: 50 TABLET, FILM COATED ORAL at 08:01

## 2022-01-22 RX ADMIN — ATORVASTATIN CALCIUM 20 MG: 20 TABLET, FILM COATED ORAL at 08:01

## 2022-01-22 RX ADMIN — THIAMINE HCL TAB 100 MG 100 MG: 100 TAB at 08:01

## 2022-01-22 RX ADMIN — METOPROLOL TARTRATE 100 MG: 50 TABLET, FILM COATED ORAL at 09:01

## 2022-01-22 RX ADMIN — OXYCODONE HYDROCHLORIDE 10 MG: 5 SOLUTION ORAL at 07:01

## 2022-01-22 RX ADMIN — GUAIFENESIN 200 MG: 100 SOLUTION ORAL at 10:01

## 2022-01-22 RX ADMIN — CIPROFLOXACIN 750 MG: KIT at 10:01

## 2022-01-22 RX ADMIN — GUAIFENESIN 200 MG: 100 SOLUTION ORAL at 06:01

## 2022-01-22 RX ADMIN — SODIUM CHLORIDE 30 MG/ML INHALATION SOLUTION 4 ML: 30 SOLUTION INHALANT at 07:01

## 2022-01-22 RX ADMIN — GUAIFENESIN 200 MG: 100 SOLUTION ORAL at 09:01

## 2022-01-22 RX ADMIN — Medication 6 MG: at 09:01

## 2022-01-22 RX ADMIN — OXYCODONE HYDROCHLORIDE 10 MG: 5 SOLUTION ORAL at 06:01

## 2022-01-22 RX ADMIN — GUAIFENESIN 200 MG: 100 SOLUTION ORAL at 02:01

## 2022-01-22 RX ADMIN — IPRATROPIUM BROMIDE AND ALBUTEROL SULFATE 3 ML: 2.5; .5 SOLUTION RESPIRATORY (INHALATION) at 04:01

## 2022-01-22 RX ADMIN — BACITRACIN 1 EACH: 500 OINTMENT TOPICAL at 08:01

## 2022-01-22 RX ADMIN — CIPROFLOXACIN 750 MG: KIT at 07:01

## 2022-01-22 RX ADMIN — ASPIRIN 81 MG CHEWABLE TABLET 81 MG: 81 TABLET CHEWABLE at 08:01

## 2022-01-22 NOTE — ASSESSMENT & PLAN NOTE
71M with [id number] SCC of the oral tongue s/p total glossectomy, bilateral neck dissections levels I-IV, tracheostomy, and ALTFF reconstruction with G tube placement by general surgery.     -Ciprofloxacin for resp culture   -Continue q4h flap checks  -Continue bolus TFs  -Plavix/ASA  -pain control   -trach teaching with respiratory   -PT/OT, OOB    Ppx: L40, ELKEs, NIKITA hose     Dispo: Discharge to rehab Monday

## 2022-01-22 NOTE — PLAN OF CARE
Plan of care reviewed with opt:  -AAOx4, written communication  -Trach #6 Shievens in place, on 5L 28% with SpO2 at 92-97%, coughed up moderate amount thick white secretion and required to be deep suction x4 during shift  -VS stable  -Juan Carlos neck incision with staples, flap checked q4h WDL, right thigh with staples CDI.   -PEG tube with tube feed bolus , pt only allow staff to give him total  3 cans of food, water bolus givenx3, encouraged pt to have more cans of food but pt reported that he cannot tolerate more than 3 cans   -complained of neck pain, pain under control with scheduled pain meds  -Had 2 BMs with loose stool. Adequate amount of benito color urine  -Up on the chair the whole shift  -Call light in reach. Bed alarm ON. WCTM

## 2022-01-22 NOTE — PLAN OF CARE
POC reviewed with pt, verbalized understanding. VSS on RA. AAOX4. Remains free of falls and injury. Pt up to chair for most of shift.    - Bilateral neck incisions sites, staples intact  -incision site to the right leg, staples intact  - flap check q 4hrs  -pt use communication board in room    NPO, bolus tube feeds provided, denies nausea. Pain controlled. Telemetry, NSR. Pt denies chest pain and SOB.No acute events or distress noted. Bed in lowest position, call light in reach, frequent rounds made for safety.    Wctm.      Problem: Adult Inpatient Plan of Care  Goal: Readiness for Transition of Care  Outcome: Ongoing, Progressing     Problem: Adult Inpatient Plan of Care  Goal: Plan of Care Review  Outcome: Met  Goal: Patient-Specific Goal (Individualized)  Outcome: Met  Goal: Absence of Hospital-Acquired Illness or Injury  Outcome: Met  Goal: Optimal Comfort and Wellbeing  Outcome: Met

## 2022-01-22 NOTE — PLAN OF CARE
Plan of care reviewed with pt. Pt uses written communication and nods appropriately to show understanding. Pt AAOx4. VS stable on 5L tach. Pain managed with PRN medicine.     - Bilat neck incision w/ staples CDI. Wound dehisced on neck. Currently healing and cleaning w/ iodine.  - R leg incision w/ staples CDI.  - Trach CDI. Suctioning done PRN. Pt coughing up thick white mucous.   - Skin tears on LWR and LFA w/ foam dressing. CDI.  - LUQ g-tube clamped. Pt declined 2 cans of tube feeds. 200ml water boluses done.  - Pt voids per urinal, adequate output.     Pt remained NPO. No complaints of nausea. Frequent rounds made for pt safety. Bed low and locked, call light in reach. Will continue to manage POC.

## 2022-01-22 NOTE — SUBJECTIVE & OBJECTIVE
Interval History: NAEON. Doing well.     Medications:  Continuous Infusions:   sodium chloride 0.9% Stopped (01/06/22 0636)     Scheduled Meds:   acetaminophen  650 mg Per G Tube Q6H    aspirin  81 mg Per G Tube Daily    atorvastatin  20 mg Per G Tube Daily    bacitracin zinc   Topical (Top) BID    ciprofloxacin 250 mg/5 ml  750 mg Per G Tube Q12H    clopidogreL  75 mg Per G Tube Daily    enoxaparin  40 mg Subcutaneous Daily    folic acid  1 mg Per G Tube Daily    guaiFENesin 100 mg/5 ml  200 mg Per G Tube Q4H    losartan  50 mg Per G Tube Daily    magnesium citrate  296 mL Per G Tube Once    melatonin  6 mg Per G Tube Nightly    metoprolol tartrate  100 mg Per G Tube BID    polyethylene glycol  17 g Per G Tube BID    senna-docusate 8.6-50 mg  1 tablet Per G Tube BID    sodium chloride 3%  4 mL Nebulization Q8H    thiamine  100 mg Per G Tube Daily     PRN Meds:sodium chloride 0.9%, albuterol-ipratropium, bisacodyL, hydrALAZINE, ondansetron, oxyCODONE, oxyCODONE, sodium chloride 0.9%, sodium chloride 0.9%     Review of patient's allergies indicates:  No Known Allergies  Objective:     Vital Signs (24h Range):  Temp:  [97 °F (36.1 °C)-98.3 °F (36.8 °C)] 98.1 °F (36.7 °C)  Pulse:  [73-89] 81  Resp:  [12-22] 20  SpO2:  [90 %-100 %] 99 %  BP: (102-130)/(57-75) 128/71     Date 01/22/22 0700 - 01/23/22 0659   Shift 5684-6811 5948-3272 1545-4379 24 Hour Total   INTAKE   NG/   575   Shift Total(mL/kg) 575(8.6)   575(8.6)   OUTPUT   Shift Total(mL/kg)       Weight (kg) 66.9 66.9 66.9 66.9     Lines/Drains/Airways     Drain                 Gastrostomy/Enterostomy 01/04/22 Percutaneous endoscopic gastrostomy (PEG) LUQ 18 days          Airway                 Surgical Airway 01/04/22 1546 Shiley Cuffed 17 days          Peripheral Intravenous Line                 Peripheral IV - Single Lumen 01/21/22 1553 20 G;1 3/4 in Anterior;Left Forearm <1 day                Physical Exam    Awake and alert, NAD  NC/AT,  EOMI  Normal external nose   MMM, rosemary-tongue ALTFF in place with good color that approximates the thigh, good tugar and warm, strong arterial doppler signal, intra-oral sutures intact with no breakdown and dehiscence  Neck soft, advancement flaps in place with areas of epidermolysis, staples intact  6-0 cuffless trach in place and secured with sherry collar, aerating well  Normal work of breathing, on trach collar, thick secretions  G tube in place  Left arm with small 1-2cm superficial bump at antecubital fossa    Significant Labs:  BMP:   Recent Labs   Lab 01/19/22  0558      CL 99   CO2 27   BUN 11   CREATININE 0.6   CALCIUM 9.1     CBC:   Recent Labs   Lab 01/19/22  0558   WBC 7.73   RBC 2.79*   HGB 8.0*   HCT 26.4*   *   MCV 95   MCH 28.7   MCHC 30.3*     Microbiology Results (last 7 days)     Procedure Component Value Units Date/Time    Culture, Anaerobe [[id number]] Collected: 01/16/22 1704    Order Status: Completed Specimen: Wound from Neck Updated: 01/20/22 0952     Anaerobic Culture Culture in progress    Culture, Respiratory with Gram Stain [[id number]]  (Abnormal)  (Susceptibility) Collected: 01/14/22 1543    Order Status: Completed Specimen: Respiratory from Tracheal Aspirate Updated: 01/18/22 0843     Respiratory Culture No S aureus isolated.      CITROBACTER KOSERI  Moderate        PSEUDOMONAS AERUGINOSA  Few  Normal respiratory ishmael also present       Gram Stain (Respiratory) <10 epithelial cells per low power field.     Gram Stain (Respiratory) Moderate WBC's     Gram Stain (Respiratory) Many Gram positive cocci      Gram Stain (Respiratory) Few Gram negative rods    Aerobic culture [[id number]]  (Abnormal)  (Susceptibility) Collected: 01/16/22 1704    Order Status: Completed Specimen: Wound from Neck Updated: 01/18/22 0822     Aerobic Bacterial Culture PROTEUS MIRABILIS  Few            Significant Diagnostics:  I have reviewed and interpreted all pertinent imaging results/findings within  the past 24 hours.

## 2022-01-22 NOTE — PROGRESS NOTES
[facility] Cherry - [facility]  Otorhinolaryngology-Head & Neck Surgery  Progress Note    Subjective:     Post-Op Info:  Procedure(s) (LRB):  GLOSSECTOMY (N/A)  TRACHEOTOMY (N/A)  EGD, WITH PEG TUBE INSERTION (N/A)  CREATION, FREE FLAP (Right)  DISSECTION, NECK (Bilateral)   18 Days Post-Op  Hospital Day: 20     Interval History: NAEON. Doing well.     Medications:  Continuous Infusions:   sodium chloride 0.9% Stopped (01/06/22 0636)     Scheduled Meds:   acetaminophen  650 mg Per G Tube Q6H    aspirin  81 mg Per G Tube Daily    atorvastatin  20 mg Per G Tube Daily    bacitracin zinc   Topical (Top) BID    ciprofloxacin 250 mg/5 ml  750 mg Per G Tube Q12H    clopidogreL  75 mg Per G Tube Daily    enoxaparin  40 mg Subcutaneous Daily    folic acid  1 mg Per G Tube Daily    guaiFENesin 100 mg/5 ml  200 mg Per G Tube Q4H    losartan  50 mg Per G Tube Daily    magnesium citrate  296 mL Per G Tube Once    melatonin  6 mg Per G Tube Nightly    metoprolol tartrate  100 mg Per G Tube BID    polyethylene glycol  17 g Per G Tube BID    senna-docusate 8.6-50 mg  1 tablet Per G Tube BID    sodium chloride 3%  4 mL Nebulization Q8H    thiamine  100 mg Per G Tube Daily     PRN Meds:sodium chloride 0.9%, albuterol-ipratropium, bisacodyL, hydrALAZINE, ondansetron, oxyCODONE, oxyCODONE, sodium chloride 0.9%, sodium chloride 0.9%     Review of patient's allergies indicates:  No Known Allergies  Objective:     Vital Signs (24h Range):  Temp:  [97 °F (36.1 °C)-98.3 °F (36.8 °C)] 98.1 °F (36.7 °C)  Pulse:  [73-89] 81  Resp:  [12-22] 20  SpO2:  [90 %-100 %] 99 %  BP: (102-130)/(57-75) 128/71     Date 01/22/22 0700 - 01/23/22 0659   Shift 5010-9629 5483-1542 3878-9625 24 Hour Total   INTAKE   NG/   575   Shift Total(mL/kg) 575(8.6)   575(8.6)   OUTPUT   Shift Total(mL/kg)       Weight (kg) 66.9 66.9 66.9 66.9     Lines/Drains/Airways     Drain                 Gastrostomy/Enterostomy 01/04/22 Percutaneous endoscopic gastrostomy  (PEG) LUQ 18 days          Airway                 Surgical Airway 01/04/22 1546 Shiley Cuffed 17 days          Peripheral Intravenous Line                 Peripheral IV - Single Lumen 01/21/22 1553 20 G;1 3/4 in Anterior;Left Forearm <1 day                Physical Exam    Awake and alert, NAD  NC/AT, EOMI  Normal external nose   MMM, rosemary-tongue ALTFF in place with good color that approximates the thigh, good tugar and warm, strong arterial doppler signal, intra-oral sutures intact with no breakdown and dehiscence  Neck soft, advancement flaps in place with areas of epidermolysis, staples intact  6-0 cuffless trach in place and secured with sherry collar, aerating well  Normal work of breathing, on trach collar, thick secretions  G tube in place  Left arm with small 1-2cm superficial bump at antecubital fossa    Significant Labs:  BMP:   Recent Labs   Lab 01/19/22  0558      CL 99   CO2 27   BUN 11   CREATININE 0.6   CALCIUM 9.1     CBC:   Recent Labs   Lab 01/19/22  0558   WBC 7.73   RBC 2.79*   HGB 8.0*   HCT 26.4*   *   MCV 95   MCH 28.7   MCHC 30.3*     Microbiology Results (last 7 days)     Procedure Component Value Units Date/Time    Culture, Anaerobe [[id number]] Collected: 01/16/22 1704    Order Status: Completed Specimen: Wound from Neck Updated: 01/20/22 0952     Anaerobic Culture Culture in progress    Culture, Respiratory with Gram Stain [[id number]]  (Abnormal)  (Susceptibility) Collected: 01/14/22 1543    Order Status: Completed Specimen: Respiratory from Tracheal Aspirate Updated: 01/18/22 0843     Respiratory Culture No S aureus isolated.      CITROBACTER KOSERI  Moderate        PSEUDOMONAS AERUGINOSA  Few  Normal respiratory ishmael also present       Gram Stain (Respiratory) <10 epithelial cells per low power field.     Gram Stain (Respiratory) Moderate WBC's     Gram Stain (Respiratory) Many Gram positive cocci      Gram Stain (Respiratory) Few Gram negative rods    Aerobic culture  [[id number]]  (Abnormal)  (Susceptibility) Collected: 01/16/22 1704    Order Status: Completed Specimen: Wound from Neck Updated: 01/18/22 0822     Aerobic Bacterial Culture PROTEUS MIRABILIS  Few            Significant Diagnostics:  I have reviewed and interpreted all pertinent imaging results/findings within the past 24 hours.    Assessment/Plan:     * Squamous cell cancer of tongue  71M with [id number] SCC of the oral tongue s/p total glossectomy, bilateral neck dissections levels I-IV, tracheostomy, and ALTFF reconstruction with G tube placement by general surgery.     -Ciprofloxacin for resp culture   -Continue q4h flap checks  -Continue bolus TFs  -Plavix/ASA  -pain control   -trach teaching with respiratory   -PT/OT, OOB    Ppx: L40, SCDs, NIKITA hose     Dispo: Discharge to rehab Monday             Bradley Pettit MD  Otorhinolaryngology-Head & Neck Surgery  [facility]

## 2022-01-22 NOTE — RESPIRATORY THERAPY
RAPID RESPONSE RESPIRATORY THERAPY PROACTIVE NOTE           Time of visit: 855    Code Status: Full Code   : 1950  [facility]: [facility]:   MRN: [id number]  Time spent at the bedside: < 15 min    SITUATION    Evaluated patient for: LDA Check     BACKGROUND    Patient has a past medical history of Cancer, COPD (chronic obstructive pulmonary disease), Hyperlipidemia, Hypertension, and Pseudoaneurysm.  Clinically Significant Surgical Hx: tracheostomy    24 Hours Vitals Range:  Temp:  [97 °F (36.1 °C)-98.3 °F (36.8 °C)]   Pulse:  [73-89]   Resp:  [12-22]   BP: (102-130)/(57-75)   SpO2:  [90 %-100 %]     Labs:    No results for input(s): NA, K, CL, CO2, CREATININE, GLU, PHOS, MG in the last 72 hours.    Invalid input(s): CMP,  BUN, TBIL     No results for input(s): PH, PCO2, PO2, HCO3, POCSATURATED, BE in the last 72 hours.    ASSESSMENT/INTERVENTIONS    Upon arrival in room pt resting and all trach supplies are at bedside    Last VS   Temp: 98.1 °F (36.7 °C) ( 0444)  Pulse: 81 ( 0800)  Resp: 20 ( 0757)  BP: 128/71 ( 0800)  SpO2: 99 % ( 0800)    Level of Consciousness: Level of Consciousness (AVPU): alert  Respiratory Effort: Respiratory Effort: Normal,Unlabored Expansion/Accessory Muscle Usage: Expansion/Accessory Muscles/Retractions: no use of accessory muscles,no retractions,expansion symmetric  All Lung Field Breath Sounds: All Lung Fields Breath Sounds: Anterior:,Posterior:,diminished,Lateral:,equal bilaterally  DEE Breath Sounds: (P) Anterior:,Posterior:,Lateral:,coarse,diminished  LLL Breath Sounds: (P) Anterior:,Posterior:,Lateral:,coarse,diminished  RUL Breath Sounds: (P) Anterior:,Posterior:,Lateral:,coarse,diminished  RML Breath Sounds: (P) Anterior:,coarse,diminished  RLL Breath Sounds: Anterior:,Posterior:,Lateral:,diminished,coarse  O2 Device/Concentration: Flow (L/min): 5, Oxygen Concentration (%): 28, O2 Device (Oxygen Therapy): Trach Collar  Surgical airway: Yes, Type: [place]aley  Size: 6   Ambu at bedside: Ambu bag with the patient?: Yes, Adult Ambu     Active Orders   Respiratory Care    Chest physiotherapy TID     Frequency: TID     Number of Occurrences: Until Specified     Order Questions:      Indications: COPIOUS SPUTUM PRODUCTION    Inhalation Treatment Q4H PRN     Frequency: Q4H PRN     Number of Occurrences: Until Specified    Oxygen Continuous     Frequency: Continuous     Number of Occurrences: Until Specified     Order Comments: Patient uses 2L at night while at home       Order Questions:      Device type: Low flow      Device: Trach Collar      FiO2%: 35      Titrate O2 per Oxygen Titration Protocol: Yes      To maintain SpO2 goal of: >= 90%      Notify MD of: Inability to achieve desired SpO2; Sudden change in patient status and requires 20% increase in FiO2; Patient requires >60% FiO2    Pulse Oximetry Continuous     Frequency: Continuous     Number of Occurrences: Until Specified    Respiratory care evaluation only Q4H     Frequency: Q4H     Number of Occurrences: Until Specified    RESPIRATORY COMMUNICATION     Frequency: Q4H     Number of Occurrences: Until Specified     Order Comments: Trach care and trach teaching      Routine tracheostomy care     Frequency: BID     Number of Occurrences: Until Specified       RECOMMENDATIONS    We recommend: RRT Recs: Continue POC per primary team.    ESCALATION        FOLLOW-UP    Please call back the Rapid Response RTSu RRT at x [phone] for any questions or concerns.

## 2022-01-22 NOTE — PLAN OF CARE
Problem: Occupational Therapy Goal  Goal: Occupational Therapy Goal  Description: Goals to be met by: 7 days 1/18/22     Patient will increase functional independence with ADLs by performing:    Pt to complete standing g/h skills with SBA  Pt to complete UE dressing with DARRYL    Pt to complete LE dressing with MIN A - GOAL MET 01/22/22  ** UPDATED GOAL:  LE Dressing with set up A  Pt to complete toileting with SBA  Pt to complete t/f to commode, bed and chair with SBA    Outcome: Ongoing, Progressing     Pt was agreeable to OT and was noted to make progress towards his goals in therapy.  During today's session, pt worked on func mobility and ADLs.  Pt noted to reach goal for LE dressing.  Pt's goals have been updated and remain appropriate at this time.  He will continue to benefit from skilled OT services in order to assist him with increasing his safety and level of independence with self care and mobility tasks.     Tasha De La Torre, OT  1/22/2022

## 2022-01-22 NOTE — PT/OT/SLP PROGRESS
Physical Therapy Treatment    Patient Name:  Fareed Richard Jr.   MRN:  [id number]    Recommendations:     Discharge Recommendations:  rehabilitation facility   Discharge Equipment Recommendations: none   Barriers to discharge: Decreased caregiver support family will not be able to assist at current functional level.     Assessment:     Fareed Richard Jr. is a 71 y.o. male admitted with a medical diagnosis of Squamous cell cancer of tongue.  He presents with the following impairments/functional limitations:  impaired endurance,impaired functional mobilty,gait instability,impaired balance,decreased safety awareness pt tolerated treatment better being able to gait train farther. Pt will benefit from cont skilled PT 4x/wk to progress physically. Pt will benefit from inpt rehab when medically stable to maximize physical progress. Pt needed verbal cues for proper RW usage for safety and to decrease fall risk. Pt is s/p trach, glossectomy, PEG, neck dissection etc 1/4/22.    Rehab Prognosis: Good; patient would benefit from acute skilled PT services to address these deficits and reach maximum level of function.    Recent Surgery: Procedure(s) (LRB):  GLOSSECTOMY (N/A)  TRACHEOTOMY (N/A)  EGD, WITH PEG TUBE INSERTION (N/A)  CREATION, FREE FLAP (Right)  DISSECTION, NECK (Bilateral) 18 Days Post-Op    Plan:     During this hospitalization, patient to be seen 4 x/week to address the identified rehab impairments via gait training,therapeutic activities,neuromuscular re-education and progress toward the following goals:    · Plan of Care Expires:  02/11/22    Subjective     Chief Complaint: pt wrote the he had already walked with OT.   Patient/Family Comments/goals: to get better and go home.   Pain/Comfort:  · Pain Rating 1: 0/10  · Pain Rating Post-Intervention 1: 0/10      Objective:     Communicated with nurse prior to session.  Patient found up in chair with telemetry,pulse ox (continuous),blood pressure  cuff,Tracheostomy,oxygen (hep lock IV) upon PT entry to room.     General Precautions: Standard, fall,hearing impaired   Orthopedic Precautions:N/A   Braces:    Respiratory Status: trach collar 28% 5L     Functional Mobility:  · Transfers:pt needed verbal cues for hand placement and sequencing for functional mobility.     · Sit to Stand:  contact guard assistance with rolling walker  ·   · Gait: pt received gait training ~ 116 ft (in room )  with RW, O2 intact and CGA. pt needed verbal cues for safety with RW usage. pt positioned himself outside of base of support of RW.       AM-PAC 6 CLICK MOBILITY  Turning over in bed (including adjusting bedclothes, sheets and blankets)?: 3  Sitting down on and standing up from a chair with arms (e.g., wheelchair, bedside commode, etc.): 3  Moving from lying on back to sitting on the side of the bed?: 3  Moving to and from a bed to a chair (including a wheelchair)?: 3  Need to walk in hospital room?: 3  Climbing 3-5 steps with a railing?: 3  Basic Mobility Total Score: 18       Therapeutic Activities and Exercises:   pt received verbal instructions in PT POC and verbally expressed understanding of such.     Patient left up in chair with all lines intact and call button in reach..    GOALS:   Multidisciplinary Problems     Physical Therapy Goals        Problem: Physical Therapy Goal    Goal Priority Disciplines Outcome Goal Variances Interventions   Physical Therapy Goal     PT, PT/OT Ongoing, Progressing     Description: Goals to be met by: 2022    Patient will increase functional independence with mobility by performin. Supine to sit with Supervision -not met  2. Sit to stand transfer with Supervision using RW -not met  3. Gait  x 150 feet with Supervision using Rolling Walker. -not met  4. Ascend/descend 3 stair with right Handrails CGA -not met  5. Stand for 3 minutes with Supervision using RW -not met                       Time Tracking:     PT Received On:  01/22/22  PT Start Time: 1311     PT Stop Time: 1325  PT Total Time (min): 14 min     Billable Minutes: Gait Training 14 min    Treatment Type: Treatment  PT/PTA: PT     PTA Visit Number: 0     01/22/2022

## 2022-01-22 NOTE — PT/OT/SLP PROGRESS
Occupational Therapy   Treatment    Name: Fareed Richard Jr.  MRN: [id number]  Admitting Diagnosis:  Squamous cell cancer of tongue  18 Days Post-Op    Recommendations:     Discharge Recommendations: rehabilitation facility  Discharge Equipment Recommendations:  none  Barriers to discharge:  Other (Comment) (increased assist needed for ADLS and mobility)    Assessment:     Fareed Richard Jr. is a 71 y.o. male with a medical diagnosis of Squamous cell cancer of tongue.  He presents with the following performance deficits affecting function:   weakness,impaired endurance,impaired self care skills,impaired functional mobilty,gait instability,impaired balance,decreased coordination,decreased safety awareness,impaired skin,impaired cardiopulmonary response to activity. Pt was agreeable to OT and was noted to make progress towards his goals in therapy.  During today's session, pt worked on [facility] mobility and ADLs.  Pt able to perform transfers and short distance ambulation with CGA using a RW.  ADLs performed while seated with set up - min A.   Pt noted to reach goal for LE dressing.  Pt's goals have been updated and remain appropriate at this time.  He will continue to benefit from skilled OT services in order to assist him with increasing his safety and level of independence with self care and mobility tasks.     Rehab Prognosis:  Good; patient would benefit from acute skilled OT services to address these deficits and reach maximum level of function.       Plan:     Patient to be seen 4 x/week to address the above listed problems via self-care/home management,therapeutic activities,therapeutic exercises  · Plan of Care Expires: 02/12/22  · Plan of Care Reviewed with: patient,spouse    Subjective     Pain/Comfort:  · Pain Rating 1: 0/10  · Pain Rating Post-Intervention 1: 0/10    Objective:     Communicated with: nurse prior to session.  Patient found up in chair with telemetry,Tracheostomy,oxygen,pulse ox  (continuous),peripheral IV upon OT entry to room.    General Precautions: Standard, fall,hearing impaired   Orthopedic Precautions:N/A   Braces: N/A  Respiratory Status: Trach collar 28% - 5L     Occupational Performance:     Bed Mobility:    · N/A - sitting in chair before/after tx session     Functional Mobility/Transfers:  · Patient completed Sit <> Stand Transfer with contact guard assistance  with  rolling walker   · Functional Mobility: Pt walking around bedside using RW with CGA - pt noted with greater adduction of feet (touching) as fatigue increased - cues for upright posture and stance given throughout     Activities of Daily Living:  · Grooming: set up     · Lower Body Dressing: minimum assistance socks      AMPAC 6 Click ADL: 14    Treatment & Education:  · Pt completed ADLs and func mobility activities for tx session as noted above  · Pt's family was present during tx session  · Pt encouraged to perform UE/LE exercises at least 3x daily for improved strength/endurance - reviewed all exercises with pt and family  · Pt required suctioning 1x during session  · Pt educated on role of OT and POC      Patient left up in chair with all lines intact and call button in reachEducation:      GOALS:   Multidisciplinary Problems     Occupational Therapy Goals        Problem: Occupational Therapy Goal    Goal Priority Disciplines Outcome Interventions   Occupational Therapy Goal     OT, PT/OT Ongoing, Progressing    Description: Goals to be met by: 7 days 1/18/22     Patient will increase functional independence with ADLs by performing:    Pt to complete standing g/h skills with SBA  Pt to complete UE dressing with DARRYL    Pt to complete LE dressing with MIN A - GOAL MET 01/22/22  ** UPDATED GOAL:  LE Dressing with set up A  Pt to complete toileting with SBA  Pt to complete t/f to commode, bed and chair with SBA                     Time Tracking:     OT Date of Treatment: 01/22/22  OT Start Time: 1113  OT Stop Time:  1137  OT Total Time (min): 24 min    Billable Minutes:Self Care/Home Management 12  Therapeutic Activity 12    OT/NELLY: OT     NELLY Visit Number: 0    1/22/2022

## 2022-01-23 PROCEDURE — 25000003 PHARM REV CODE 250

## 2022-01-23 PROCEDURE — 25000003 PHARM REV CODE 250: Performed by: STUDENT IN AN ORGANIZED HEALTH CARE EDUCATION/TRAINING PROGRAM

## 2022-01-23 PROCEDURE — 27000221 HC OXYGEN, UP TO 24 HOURS

## 2022-01-23 PROCEDURE — 20600001 HC STEP DOWN PRIVATE ROOM

## 2022-01-23 PROCEDURE — 94640 AIRWAY INHALATION TREATMENT: CPT

## 2022-01-23 PROCEDURE — 63600175 PHARM REV CODE 636 W HCPCS: Performed by: STUDENT IN AN ORGANIZED HEALTH CARE EDUCATION/TRAINING PROGRAM

## 2022-01-23 PROCEDURE — 99900035 HC TECH TIME PER 15 MIN (STAT)

## 2022-01-23 PROCEDURE — 94761 N-INVAS EAR/PLS OXIMETRY MLT: CPT

## 2022-01-23 PROCEDURE — 25000242 PHARM REV CODE 250 ALT 637 W/ HCPCS: Performed by: STUDENT IN AN ORGANIZED HEALTH CARE EDUCATION/TRAINING PROGRAM

## 2022-01-23 PROCEDURE — 25000003 PHARM REV CODE 250: Performed by: NURSE PRACTITIONER

## 2022-01-23 PROCEDURE — 25000003 PHARM REV CODE 250: Performed by: OTOLARYNGOLOGY

## 2022-01-23 PROCEDURE — 25000242 PHARM REV CODE 250 ALT 637 W/ HCPCS: Performed by: OTOLARYNGOLOGY

## 2022-01-23 PROCEDURE — 94668 MNPJ CHEST WALL SBSQ: CPT

## 2022-01-23 RX ADMIN — THIAMINE HCL TAB 100 MG 100 MG: 100 TAB at 08:01

## 2022-01-23 RX ADMIN — GUAIFENESIN 200 MG: 100 SOLUTION ORAL at 09:01

## 2022-01-23 RX ADMIN — GUAIFENESIN 200 MG: 100 SOLUTION ORAL at 01:01

## 2022-01-23 RX ADMIN — SODIUM CHLORIDE 30 MG/ML INHALATION SOLUTION 4 ML: 30 SOLUTION INHALANT at 03:01

## 2022-01-23 RX ADMIN — CLOPIDOGREL 75 MG: 75 TABLET, FILM COATED ORAL at 08:01

## 2022-01-23 RX ADMIN — Medication 6 MG: at 09:01

## 2022-01-23 RX ADMIN — ATORVASTATIN CALCIUM 20 MG: 20 TABLET, FILM COATED ORAL at 08:01

## 2022-01-23 RX ADMIN — CIPROFLOXACIN 750 MG: KIT at 08:01

## 2022-01-23 RX ADMIN — BACITRACIN 1 EACH: 500 OINTMENT TOPICAL at 08:01

## 2022-01-23 RX ADMIN — OXYCODONE HYDROCHLORIDE 10 MG: 5 SOLUTION ORAL at 01:01

## 2022-01-23 RX ADMIN — ACETAMINOPHEN 650 MG: 325 TABLET ORAL at 12:01

## 2022-01-23 RX ADMIN — FOLIC ACID 1 MG: 1 TABLET ORAL at 08:01

## 2022-01-23 RX ADMIN — ENOXAPARIN SODIUM 40 MG: 40 INJECTION SUBCUTANEOUS at 06:01

## 2022-01-23 RX ADMIN — BACITRACIN 1 EACH: 500 OINTMENT TOPICAL at 09:01

## 2022-01-23 RX ADMIN — SODIUM CHLORIDE 30 MG/ML INHALATION SOLUTION 4 ML: 30 SOLUTION INHALANT at 12:01

## 2022-01-23 RX ADMIN — GUAIFENESIN 200 MG: 100 SOLUTION ORAL at 06:01

## 2022-01-23 RX ADMIN — METOPROLOL TARTRATE 100 MG: 50 TABLET, FILM COATED ORAL at 08:01

## 2022-01-23 RX ADMIN — METOPROLOL TARTRATE 100 MG: 50 TABLET, FILM COATED ORAL at 09:01

## 2022-01-23 RX ADMIN — CIPROFLOXACIN 750 MG: KIT at 09:01

## 2022-01-23 RX ADMIN — LOSARTAN POTASSIUM 50 MG: 50 TABLET, FILM COATED ORAL at 08:01

## 2022-01-23 RX ADMIN — ACETAMINOPHEN 650 MG: 325 TABLET ORAL at 06:01

## 2022-01-23 RX ADMIN — ASPIRIN 81 MG CHEWABLE TABLET 81 MG: 81 TABLET CHEWABLE at 08:01

## 2022-01-23 RX ADMIN — SODIUM CHLORIDE 30 MG/ML INHALATION SOLUTION 4 ML: 30 SOLUTION INHALANT at 07:01

## 2022-01-23 NOTE — PROGRESS NOTES
[facility] - [facility]  Otorhinolaryngology-Head & Neck Surgery  Progress Note    Subjective:     Post-Op Info:  Procedure(s) (LRB):  GLOSSECTOMY (N/A)  TRACHEOTOMY (N/A)  EGD, WITH PEG TUBE INSERTION (N/A)  CREATION, FREE FLAP (Right)  DISSECTION, NECK (Bilateral)   19 Days Post-Op  Hospital Day: 21     Interval History: NAEON. No issues or complaints.     Medications:  Continuous Infusions:   sodium chloride 0.9% Stopped (01/06/22 0636)     Scheduled Meds:   acetaminophen  650 mg Per G Tube Q6H    aspirin  81 mg Per G Tube Daily    atorvastatin  20 mg Per G Tube Daily    bacitracin zinc   Topical (Top) BID    ciprofloxacin 250 mg/5 ml  750 mg Per G Tube Q12H    clopidogreL  75 mg Per G Tube Daily    enoxaparin  40 mg Subcutaneous Daily    folic acid  1 mg Per G Tube Daily    guaiFENesin 100 mg/5 ml  200 mg Per G Tube Q4H    losartan  50 mg Per G Tube Daily    magnesium citrate  296 mL Per G Tube Once    melatonin  6 mg Per G Tube Nightly    metoprolol tartrate  100 mg Per G Tube BID    polyethylene glycol  17 g Per G Tube BID    senna-docusate 8.6-50 mg  1 tablet Per G Tube BID    sodium chloride 3%  4 mL Nebulization Q8H    thiamine  100 mg Per G Tube Daily     PRN Meds:sodium chloride 0.9%, albuterol-ipratropium, bisacodyL, hydrALAZINE, ondansetron, oxyCODONE, oxyCODONE, sodium chloride 0.9%, sodium chloride 0.9%     Review of patient's allergies indicates:  No Known Allergies  Objective:     Vital Signs (24h Range):  Temp:  [96.7 °F (35.9 °C)-97.5 °F (36.4 °C)] 96.8 °F (36 °C)  Pulse:  [64-85] 68  Resp:  [14-97] 14  SpO2:  [94 %-100 %] 94 %  BP: (102-143)/(55-75) 102/57       Lines/Drains/Airways     Drain                 Gastrostomy/Enterostomy 01/04/22 Percutaneous endoscopic gastrostomy (PEG) LUQ 19 days          Airway                 Surgical Airway 01/04/22 1546 Shiley Cuffed 18 days          Peripheral Intravenous Line                 Peripheral IV - Single Lumen 01/21/22 1553 20 G;1 3/4 in  Anterior;Left Forearm 1 day                Physical Exam    Awake and alert, NAD  NC/AT, EOMI  Normal external nose   MMM, rosemary-tongue ALTFF in place with good color that approximates the thigh, good tugar and warm, strong arterial doppler signal, intra-oral sutures intact with no breakdown and dehiscence  Neck soft, advancement flaps in place with areas of epidermolysis, staples intact  6-0 cuffless trach in place and secured with sherry collar, aerating well  Normal work of breathing, on trach collar, thick secretions  G tube in place  Left arm with small 1-2cm superficial bump at antecubital fossa    Significant Labs:  BMP:   Recent Labs   Lab 01/19/22  0558      CL 99   CO2 27   BUN 11   CREATININE 0.6   CALCIUM 9.1     CBC:   Recent Labs   Lab 01/19/22  0558   WBC 7.73   RBC 2.79*   HGB 8.0*   HCT 26.4*   *   MCV 95   MCH 28.7   MCHC 30.3*     Microbiology Results (last 7 days)     Procedure Component Value Units Date/Time    Culture, Anaerobe [[id number]] Collected: 01/16/22 1704    Order Status: Completed Specimen: Wound from Neck Updated: 01/20/22 0952     Anaerobic Culture Culture in progress    Culture, Respiratory with Gram Stain [[id number]]  (Abnormal)  (Susceptibility) Collected: 01/14/22 1543    Order Status: Completed Specimen: Respiratory from Tracheal Aspirate Updated: 01/18/22 0843     Respiratory Culture No S aureus isolated.      CITROBACTER KOSERI  Moderate        PSEUDOMONAS AERUGINOSA  Few  Normal respiratory ishmael also present       Gram Stain (Respiratory) <10 epithelial cells per low power field.     Gram Stain (Respiratory) Moderate WBC's     Gram Stain (Respiratory) Many Gram positive cocci      Gram Stain (Respiratory) Few Gram negative rods    Aerobic culture [[id number]]  (Abnormal)  (Susceptibility) Collected: 01/16/22 1704    Order Status: Completed Specimen: Wound from Neck Updated: 01/18/22 0822     Aerobic Bacterial Culture PROTEUS MIRABILIS  Few            Significant  Diagnostics:  I have reviewed and interpreted all pertinent imaging results/findings within the past 24 hours.    Assessment/Plan:     * Squamous cell cancer of tongue  71M with [id number] SCC of the oral tongue s/p total glossectomy, bilateral neck dissections levels I-IV, tracheostomy, and ALTFF reconstruction with G tube placement by general surgery.     -Ciprofloxacin for resp culture   -Continue q4h flap checks  -Continue bolus TFs  -Plavix/ASA  -pain control   -trach teaching with respiratory   -PT/OT, OOB    Ppx: L40, SCDs, NIKITA hose     Dispo: Discharge to rehab Monday             Bradley Pettit MD  Otorhinolaryngology-Head & Neck Surgery  [facility]

## 2022-01-23 NOTE — ASSESSMENT & PLAN NOTE
71M with [id number] SCC of the oral tongue s/p total glossectomy, bilateral neck dissections levels I-IV, tracheostomy, and ALTFF reconstruction with G tube placement by general surgery.     -Ciprofloxacin for resp culture   -Continue q4h flap checks  -Continue bolus TFs  -Plavix/ASA  -pain control   -trach teaching with respiratory   -PT/OT, OOB    Ppx: L40, ELKEs, NIKITA hose     Dispo: Discharge to rehab Monday

## 2022-01-23 NOTE — SUBJECTIVE & OBJECTIVE
Interval History: NAEON. No issues or complaints.     Medications:  Continuous Infusions:   sodium chloride 0.9% Stopped (01/06/22 0636)     Scheduled Meds:   acetaminophen  650 mg Per G Tube Q6H    aspirin  81 mg Per G Tube Daily    atorvastatin  20 mg Per G Tube Daily    bacitracin zinc   Topical (Top) BID    ciprofloxacin 250 mg/5 ml  750 mg Per G Tube Q12H    clopidogreL  75 mg Per G Tube Daily    enoxaparin  40 mg Subcutaneous Daily    folic acid  1 mg Per G Tube Daily    guaiFENesin 100 mg/5 ml  200 mg Per G Tube Q4H    losartan  50 mg Per G Tube Daily    magnesium citrate  296 mL Per G Tube Once    melatonin  6 mg Per G Tube Nightly    metoprolol tartrate  100 mg Per G Tube BID    polyethylene glycol  17 g Per G Tube BID    senna-docusate 8.6-50 mg  1 tablet Per G Tube BID    sodium chloride 3%  4 mL Nebulization Q8H    thiamine  100 mg Per G Tube Daily     PRN Meds:sodium chloride 0.9%, albuterol-ipratropium, bisacodyL, hydrALAZINE, ondansetron, oxyCODONE, oxyCODONE, sodium chloride 0.9%, sodium chloride 0.9%     Review of patient's allergies indicates:  No Known Allergies  Objective:     Vital Signs (24h Range):  Temp:  [96.7 °F (35.9 °C)-97.5 °F (36.4 °C)] 96.8 °F (36 °C)  Pulse:  [64-85] 68  Resp:  [14-97] 14  SpO2:  [94 %-100 %] 94 %  BP: (102-143)/(55-75) 102/57       Lines/Drains/Airways     Drain                 Gastrostomy/Enterostomy 01/04/22 Percutaneous endoscopic gastrostomy (PEG) LUQ 19 days          Airway                 Surgical Airway 01/04/22 1546 Shiley Cuffed 18 days          Peripheral Intravenous Line                 Peripheral IV - Single Lumen 01/21/22 1553 20 G;1 3/4 in Anterior;Left Forearm 1 day                Physical Exam    Awake and alert, NAD  NC/AT, EOMI  Normal external nose   MMM, rosemary-tongue ALTFF in place with good color that approximates the thigh, good tugar and warm, strong arterial doppler signal, intra-oral sutures intact with no breakdown and  dehiscence  Neck soft, advancement flaps in place with areas of epidermolysis, staples intact  6-0 cuffless trach in place and secured with sherry collar, aerating well  Normal work of breathing, on trach collar, thick secretions  G tube in place  Left arm with small 1-2cm superficial bump at antecubital fossa    Significant Labs:  BMP:   Recent Labs   Lab 01/19/22  0558      CL 99   CO2 27   BUN 11   CREATININE 0.6   CALCIUM 9.1     CBC:   Recent Labs   Lab 01/19/22  0558   WBC 7.73   RBC 2.79*   HGB 8.0*   HCT 26.4*   *   MCV 95   MCH 28.7   MCHC 30.3*     Microbiology Results (last 7 days)     Procedure Component Value Units Date/Time    Culture, Anaerobe [[id number]] Collected: 01/16/22 1704    Order Status: Completed Specimen: Wound from Neck Updated: 01/20/22 0952     Anaerobic Culture Culture in progress    Culture, Respiratory with Gram Stain [[id number]]  (Abnormal)  (Susceptibility) Collected: 01/14/22 1543    Order Status: Completed Specimen: Respiratory from Tracheal Aspirate Updated: 01/18/22 0843     Respiratory Culture No S aureus isolated.      CITROBACTER KOSERI  Moderate        PSEUDOMONAS AERUGINOSA  Few  Normal respiratory ishmael also present       Gram Stain (Respiratory) <10 epithelial cells per low power field.     Gram Stain (Respiratory) Moderate WBC's     Gram Stain (Respiratory) Many Gram positive cocci      Gram Stain (Respiratory) Few Gram negative rods    Aerobic culture [[id number]]  (Abnormal)  (Susceptibility) Collected: 01/16/22 1704    Order Status: Completed Specimen: Wound from Neck Updated: 01/18/22 0822     Aerobic Bacterial Culture PROTEUS MIRABILIS  Few            Significant Diagnostics:  I have reviewed and interpreted all pertinent imaging results/findings within the past 24 hours.

## 2022-01-23 NOTE — RESPIRATORY THERAPY
RAPID RESPONSE RESPIRATORY THERAPY PROACTIVE NOTE           Time of visit: 932    Code Status: Full Code   : 1950  Bed: [facility] A:   MRN: [id number]  Time spent at the bedside: < 15 min    SITUATION    Evaluated patient for: LDA Check     BACKGROUND    Patient has a past medical history of Cancer, COPD (chronic obstructive pulmonary disease), Hyperlipidemia, Hypertension, and Pseudoaneurysm.  Clinically Significant Surgical Hx: tracheostomy    24 Hours Vitals Range:  Temp:  [96.7 °F (35.9 °C)-97.5 °F (36.4 °C)]   Pulse:  [64-85]   Resp:  [14-97]   BP: (102-143)/(55-75)   SpO2:  [94 %-100 %]     Labs:    No results for input(s): NA, K, CL, CO2, CREATININE, GLU, PHOS, MG in the last 72 hours.    Invalid input(s): CMP,  BUN, TBIL     No results for input(s): PH, PCO2, PO2, HCO3, POCSATURATED, BE in the last 72 hours.    ASSESSMENT/INTERVENTIONS    Upon arrival in room pt resting and has no resp concerns at this time   All trach supplies at bedside    Last VS   Temp: 96.8 °F (36 °C) (340)  Pulse: 77 (0745)  Resp: 16 (745)  BP: 102/57 (340)  SpO2: 100 % (745)    Level of Consciousness: Level of Consciousness (AVPU): alert  Respiratory Effort: Respiratory Effort: Unlabored,Normal Expansion/Accessory Muscle Usage: Expansion/Accessory Muscles/Retractions: no use of accessory muscles  All Lung Field Breath Sounds: All Lung Fields Breath Sounds: Anterior:,Lateral:,coarse  DEE Breath Sounds: (P) Anterior:,Posterior:,Lateral:,coarse,diminished  LLL Breath Sounds: (P) Anterior:,Posterior:,Lateral:,coarse,diminished  RUL Breath Sounds: (P) Anterior:,Posterior:,Lateral:,coarse,diminished  RML Breath Sounds: (P) Anterior:,coarse,diminished  RLL Breath Sounds: Anterior:,Posterior:,Lateral:,diminished,coarse  O2 Device/Concentration: Flow (L/min): 5, Oxygen Concentration (%): 28, O2 Device (Oxygen Therapy): Trach Collar  Surgical airway: Yes, Type: Shiley Size: 6   Ambu at bedside: Ambu bag  with the patient?: Yes, Adult Ambu     Active Orders   Respiratory Care    Chest physiotherapy TID     Frequency: TID     Number of Occurrences: Until Specified     Order Questions:      Indications: COPIOUS SPUTUM PRODUCTION    Inhalation Treatment Q4H PRN     Frequency: Q4H PRN     Number of Occurrences: Until Specified    Oxygen Continuous     Frequency: Continuous     Number of Occurrences: Until Specified     Order Comments: Patient uses 2L at night while at home       Order Questions:      Device type: Low flow      Device: Trach Collar      FiO2%: 35      Titrate O2 per Oxygen Titration Protocol: Yes      To maintain SpO2 goal of: >= 90%      Notify MD of: Inability to achieve desired SpO2; Sudden change in patient status and requires 20% increase in FiO2; Patient requires >60% FiO2    Pulse Oximetry Continuous     Frequency: Continuous     Number of Occurrences: Until Specified    Respiratory care evaluation only Q4H     Frequency: Q4H     Number of Occurrences: Until Specified    RESPIRATORY COMMUNICATION     Frequency: Q4H     Number of Occurrences: Until Specified     Order Comments: Trach care and trach teaching      Routine tracheostomy care     Frequency: BID     Number of Occurrences: Until Specified       RECOMMENDATIONS    We recommend: RRT Recs: Continue POC per primary team.    ESCALATION        FOLLOW-UP    Please call back the Rapid Response RT, Su Avendano RRT at x [phone] for any questions or concerns.

## 2022-01-23 NOTE — CARE UPDATE
RAPID RESPONSE NURSE ROUND       Rounding completed with charge RN, Elian. No concerns verbalized at this time. Instructed to call [phone] for further concerns or assistance.

## 2022-01-23 NOTE — CARE UPDATE
"RAPID RESPONSE NURSE AI ALERT       AI alert received.    Chart Reviewed: 01/22/2022, 6:19 PM    MRN: [id number]  Bed: 1027/1027 A    Dx: Squamous cell cancer of tongue    Fareed Richard Jr. has a past medical history of Cancer, COPD (chronic obstructive pulmonary disease), Hyperlipidemia, Hypertension, and Pseudoaneurysm.    Last VS: BP (!) 109/57 (BP Location: Right arm, Patient Position: Lying)   Pulse 76   Temp 97.5 °F (36.4 °C) (Axillary)   Resp 18   Ht 5' 8" (1.727 m)   Wt 66.9 kg (147 lb 7.8 oz)   SpO2 100%   BMI 22.43 kg/m²     24H Vital Sign Range:  Temp:  [97 °F (36.1 °C)-98.3 °F (36.8 °C)]   Pulse:  [73-87]   Resp:  [12-31]   BP: (109-130)/(57-75)   SpO2:  [90 %-100 %]     Level of Consciousness (AVPU): alert    No results for input(s): CBC, WBC, HGB, HCT, PLT in the last 72 hours.    No results for input(s): NA, K, CL, CO2, CREATININE, GLU, PHOS, MG in the last 72 hours.    Invalid input(s): CMP,  BUN, TBIL     No results for input(s): PH, PCO2, PO2, HCO3, POCSATURATED, BE in the last 72 hours.     OXYGEN:  Flow (L/min): 5  Oxygen Concentration (%): 28  O2 Device (Oxygen Therapy): Trach Collar    MEWS score: 1    TERI RN, Linh contacted. No concerns verbalized at this time. Instructed to call [phone] for further concerns or assistance.    Fay Pisano RN        "

## 2022-01-23 NOTE — CARE UPDATE
RAPID RESPONSE NURSE ROUND       Rounding completed with charge RN, Mychal. No concerns verbalized at this time. Instructed to call [phone] for further concerns or assistance.

## 2022-01-24 VITALS
OXYGEN SATURATION: 96 % | SYSTOLIC BLOOD PRESSURE: 136 MMHG | HEART RATE: 99 BPM | HEIGHT: 68 IN | DIASTOLIC BLOOD PRESSURE: 82 MMHG | TEMPERATURE: 98 F | BODY MASS INDEX: 22.35 KG/M2 | RESPIRATION RATE: 22 BRPM | WEIGHT: 147.5 LBS

## 2022-01-24 LAB — BACTERIA SPEC ANAEROBE CULT: NORMAL

## 2022-01-24 PROCEDURE — 97535 SELF CARE MNGMENT TRAINING: CPT

## 2022-01-24 PROCEDURE — 94640 AIRWAY INHALATION TREATMENT: CPT

## 2022-01-24 PROCEDURE — 25000003 PHARM REV CODE 250

## 2022-01-24 PROCEDURE — 25000003 PHARM REV CODE 250: Performed by: NURSE PRACTITIONER

## 2022-01-24 PROCEDURE — 92609 USE OF SPEECH DEVICE SERVICE: CPT

## 2022-01-24 PROCEDURE — 99900035 HC TECH TIME PER 15 MIN (STAT)

## 2022-01-24 PROCEDURE — 27200966 HC CLOSED SUCTION SYSTEM

## 2022-01-24 PROCEDURE — 27100171 HC OXYGEN HIGH FLOW UP TO 24 HOURS

## 2022-01-24 PROCEDURE — 63600175 PHARM REV CODE 636 W HCPCS: Performed by: STUDENT IN AN ORGANIZED HEALTH CARE EDUCATION/TRAINING PROGRAM

## 2022-01-24 PROCEDURE — 99900026 HC AIRWAY MAINTENANCE (STAT)

## 2022-01-24 PROCEDURE — 25000242 PHARM REV CODE 250 ALT 637 W/ HCPCS: Performed by: OTOLARYNGOLOGY

## 2022-01-24 PROCEDURE — 25000003 PHARM REV CODE 250: Performed by: OTOLARYNGOLOGY

## 2022-01-24 PROCEDURE — 25000242 PHARM REV CODE 250 ALT 637 W/ HCPCS: Performed by: STUDENT IN AN ORGANIZED HEALTH CARE EDUCATION/TRAINING PROGRAM

## 2022-01-24 PROCEDURE — 94761 N-INVAS EAR/PLS OXIMETRY MLT: CPT

## 2022-01-24 PROCEDURE — 25000003 PHARM REV CODE 250: Performed by: STUDENT IN AN ORGANIZED HEALTH CARE EDUCATION/TRAINING PROGRAM

## 2022-01-24 RX ADMIN — FOLIC ACID 1 MG: 1 TABLET ORAL at 10:01

## 2022-01-24 RX ADMIN — BACITRACIN 1 EACH: 500 OINTMENT TOPICAL at 10:01

## 2022-01-24 RX ADMIN — CIPROFLOXACIN 750 MG: KIT at 10:01

## 2022-01-24 RX ADMIN — GUAIFENESIN 200 MG: 100 SOLUTION ORAL at 03:01

## 2022-01-24 RX ADMIN — SODIUM CHLORIDE 30 MG/ML INHALATION SOLUTION 4 ML: 30 SOLUTION INHALANT at 08:01

## 2022-01-24 RX ADMIN — METOPROLOL TARTRATE 100 MG: 50 TABLET, FILM COATED ORAL at 10:01

## 2022-01-24 RX ADMIN — ATORVASTATIN CALCIUM 20 MG: 20 TABLET, FILM COATED ORAL at 10:01

## 2022-01-24 RX ADMIN — ACETAMINOPHEN 650 MG: 325 TABLET ORAL at 06:01

## 2022-01-24 RX ADMIN — GUAIFENESIN 200 MG: 100 SOLUTION ORAL at 10:01

## 2022-01-24 RX ADMIN — GUAIFENESIN 200 MG: 100 SOLUTION ORAL at 02:01

## 2022-01-24 RX ADMIN — ACETAMINOPHEN 650 MG: 325 TABLET ORAL at 12:01

## 2022-01-24 RX ADMIN — GUAIFENESIN 200 MG: 100 SOLUTION ORAL at 06:01

## 2022-01-24 RX ADMIN — THIAMINE HCL TAB 100 MG 100 MG: 100 TAB at 10:01

## 2022-01-24 RX ADMIN — ENOXAPARIN SODIUM 40 MG: 40 INJECTION SUBCUTANEOUS at 04:01

## 2022-01-24 RX ADMIN — CLOPIDOGREL 75 MG: 75 TABLET, FILM COATED ORAL at 10:01

## 2022-01-24 RX ADMIN — OXYCODONE HYDROCHLORIDE 10 MG: 5 SOLUTION ORAL at 12:01

## 2022-01-24 RX ADMIN — ASPIRIN 81 MG CHEWABLE TABLET 81 MG: 81 TABLET CHEWABLE at 10:01

## 2022-01-24 RX ADMIN — LOSARTAN POTASSIUM 50 MG: 50 TABLET, FILM COATED ORAL at 10:01

## 2022-01-24 RX ADMIN — GUAIFENESIN 200 MG: 100 SOLUTION ORAL at 05:01

## 2022-01-24 NOTE — PLAN OF CARE
01/24/22 1315   Post-Acute Status   Post-Acute Authorization Placement   Post-Acute Placement Status Set-up Complete/Auth obtained   Pt going to [facility] arranged stretcher transport via [facility]. Requested  time is 5:00 PM, per facility's request. Requested  time does not guarantee arrival time.  Nurse can call report to Yvette at [phone].     Kirsten Lutz LMSW  [facility]  [phone]

## 2022-01-24 NOTE — PROGRESS NOTES
[facility]  Otorhinolaryngology-Head & Neck Surgery  Progress Note    Subjective:     Post-Op Info:  Procedure(s) (LRB):  GLOSSECTOMY (N/A)  TRACHEOTOMY (N/A)  EGD, WITH PEG TUBE INSERTION (N/A)  CREATION, FREE FLAP (Right)  DISSECTION, NECK (Bilateral)   20 Days Post-Op  Hospital Day: 22     Interval History: NAEON.     Medications:  Continuous Infusions:   sodium chloride 0.9% Stopped (01/06/22 0636)     Scheduled Meds:   acetaminophen  650 mg Per G Tube Q6H    aspirin  81 mg Per G Tube Daily    atorvastatin  20 mg Per G Tube Daily    bacitracin zinc   Topical (Top) BID    ciprofloxacin 250 mg/5 ml  750 mg Per G Tube Q12H    clopidogreL  75 mg Per G Tube Daily    enoxaparin  40 mg Subcutaneous Daily    folic acid  1 mg Per G Tube Daily    guaiFENesin 100 mg/5 ml  200 mg Per G Tube Q4H    losartan  50 mg Per G Tube Daily    magnesium citrate  296 mL Per G Tube Once    melatonin  6 mg Per G Tube Nightly    metoprolol tartrate  100 mg Per G Tube BID    polyethylene glycol  17 g Per G Tube BID    senna-docusate 8.6-50 mg  1 tablet Per G Tube BID    sodium chloride 3%  4 mL Nebulization Q8H    thiamine  100 mg Per G Tube Daily     PRN Meds:sodium chloride 0.9%, albuterol-ipratropium, bisacodyL, hydrALAZINE, ondansetron, oxyCODONE, oxyCODONE, sodium chloride, sodium chloride 0.9%, sodium chloride 0.9%     Review of patient's allergies indicates:  No Known Allergies  Objective:     Vital Signs (24h Range):  Temp:  [97.4 °F (36.3 °C)-98.5 °F (36.9 °C)] 97.9 °F (36.6 °C)  Pulse:  [67-90] 74  Resp:  [16-20] 18  SpO2:  [94 %-100 %] 95 %  BP: (117-151)/(61-81) 133/67       Lines/Drains/Airways     Drain                 Gastrostomy/Enterostomy 01/04/22 Percutaneous endoscopic gastrostomy (PEG) LUQ 20 days          Airway                 Surgical Airway 01/04/22 1546 Shiley Cuffed 19 days          Peripheral Intravenous Line                 Peripheral IV - Single Lumen 01/21/22 1553 20 G;1 3/4 in  Anterior;Left Forearm 2 days                Physical Exam    Awake and alert, NAD  NC/AT, EOMI  Normal external nose   MMM, rosemary-tongue ALTFF in place with good color that approximates the thigh, good tugar and warm, intra-oral sutures intact with no breakdown and dehiscence  Neck soft, advancement flaps in place with areas of epidermolysis, staples intact  6-0 cuffless trach in place and secured with sherry collar, aerating well  Normal work of breathing, on trach collar, thick secretions  G tube in place      Significant Labs:  BMP:   Recent Labs   Lab 01/19/22  0558      CL 99   CO2 27   BUN 11   CREATININE 0.6   CALCIUM 9.1     CBC:   Recent Labs   Lab 01/19/22  0558   WBC 7.73   RBC 2.79*   HGB 8.0*   HCT 26.4*   *   MCV 95   MCH 28.7   MCHC 30.3*     Microbiology Results (last 7 days)     Procedure Component Value Units Date/Time    Culture, Anaerobe [[id number]] Collected: 01/16/22 1704    Order Status: Completed Specimen: Wound from Neck Updated: 01/24/22 0651     Anaerobic Culture No anaerobes isolated    Culture, Respiratory with Gram Stain [[id number]]  (Abnormal)  (Susceptibility) Collected: 01/14/22 1543    Order Status: Completed Specimen: Respiratory from Tracheal Aspirate Updated: 01/18/22 0843     Respiratory Culture No S aureus isolated.      CITROBACTER KOSERI  Moderate        PSEUDOMONAS AERUGINOSA  Few  Normal respiratory ishmael also present       Gram Stain (Respiratory) <10 epithelial cells per low power field.     Gram Stain (Respiratory) Moderate WBC's     Gram Stain (Respiratory) Many Gram positive cocci      Gram Stain (Respiratory) Few Gram negative rods    Aerobic culture [[id number]]  (Abnormal)  (Susceptibility) Collected: 01/16/22 1704    Order Status: Completed Specimen: Wound from Neck Updated: 01/18/22 0822     Aerobic Bacterial Culture PROTEUS MIRABILIS  Few            Significant Diagnostics:  I have reviewed and interpreted all pertinent imaging results/findings within  the past 24 hours.    Assessment/Plan:     * Squamous cell cancer of tongue  71M with [id number] SCC of the oral tongue s/p total glossectomy, bilateral neck dissections levels I-IV, tracheostomy, and ALTFF reconstruction with G tube placement by general surgery.     -Ciprofloxacin for resp culture   -Continue bolus TFs  -Plavix/ASA  -pain control   -trach teaching with respiratory   -PT/OT, OOB    Ppx: L40, SCDs, NIKITA hose     Dispo: Discharge to rehab today            Annmarie Oliveira MD  Otorhinolaryngology-Head & Neck Surgery  [facility]

## 2022-01-24 NOTE — PT/OT/SLP PROGRESS
Physical Therapy      Patient Name:  Fareed Richard Jr.   MRN:  [id number]    Patient not seen today secondary to  (OT attempted in AM and pt. declining therapy due to awaiting transfer to Rehab today). Will follow-up tomorrow, if pt. not discharged.    Nikita Mckeon, PT  1/24/2022

## 2022-01-24 NOTE — ASSESSMENT & PLAN NOTE
71M with [id number] SCC of the oral tongue s/p total glossectomy, bilateral neck dissections levels I-IV, tracheostomy, and ALTFF reconstruction with G tube placement by general surgery.     -Ciprofloxacin for resp culture   -Continue bolus TFs  -Plavix/ASA  -pain control   -trach teaching with respiratory   -PT/OT, OOB    Ppx: L40, SCDs, NIKITA hose     Dispo: Discharge to rehab today

## 2022-01-24 NOTE — DISCHARGE SUMMARY
Otolaryngology - Head and Neck Surgery  Discharge Summary    Admission Date: 1/3/2022  Discharge Date: 01/24/2022    Admission Diagnosis:   1. Squamous cell cancer of tongue    2. Mass of tongue    3. Tongue cancer    4. Syncope and collapse        Discharge Diagnosis:   1. Squamous cell cancer of tongue    2. Mass of tongue    3. Tongue cancer    4. Syncope and collapse        History of Present Illness: Fareed Richard Jr. is a 71 y.o. 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  He was noted to have adenopathy in the left submandibular region with involving the overlying skin.  Given the above, it was recommended that we proceed to the operating room for resection and reconstruction.      He was apprised of the risks, benefits and alternatives to surgery.  In spite of the risk inherent to surgery,he provided informed consent for the aforementioned procedures.     Procedures:  1/4/22  1. Direct laryngoscopy  2. Tracheostomy  3. Bilateral modified neck dissections of levels 1 through 4 with resection of the left submandibular cervical skin  4. Total glossectomy  5. Left anterolateral thigh free flap for closure of total glossectomy defect  6. Advancement flap for closure of left thigh donor site advanced area was 30 x 11 cm   7. Vestibuloplasty  8. Advancement flap flap for closure of neck defect advanced area 20x9 cm  9. PEG    Operative Findings:   There was noted to be an ulcerated tumor of the left anterolateral aspect of the oral tongue.  There was extensive submucosal involvement involving the vast majority of the tongue.    Hospital Course: Fareed Richard Jr. was admitted to [facility] on 1/3. Surgery had been scheduled for that day, but was postponed to the following day due to a late OR start time. He underwent the aforementioned surgeries on 1/4 with Dr. Smalls and Dr. Conde. Following surgery, he was transferred to the SICU. His SICU stay was prolonged as he awaited a PCU  bed on the floor. He was stepped down to the PCU on POD #9, but was stepped up to the SICU after being found on the floor of his room. While in the SICU, he had a desaturation episode following accidental trach dislodgement. Sats improved with trach change by ENT. He experienced copious secretions requiring frequent suctioning. Respiratory cultures grew citrobacter koseri, pseudomonas, and proteus mirabilis. ID was consulted and he was placed on cipro. He failed a MBSS and was kept NPO. He was able to tolerate tube feeds. His hospital stay was prolonged due to difficulty with rehab facility staffing. Once placement was secured, he was transferred to re[facility] at [facility].    Consults: SICU, general surgery, ID, PMR, PT, OT, SLP, nutrition, social work    Medications:       Medication List      START taking these medications    acetaminophen 325 MG tablet  Commonly known as: TYLENOL  2 tablets (650 mg total) by Per G Tube route every 6 (six) hours.     albuterol-ipratropium 2.5 mg-0.5 mg/3 mL nebulizer solution  Commonly known as: DUO-NEB  Take 3 mLs by nebulization every 4 (four) hours as needed for Wheezing. Rescue     aspirin 81 MG Chew  1 tablet (81 mg total) by Per G Tube route once daily.  Replaces: aspirin 81 MG EC tablet     bisacodyL 10 mg Supp  Commonly known as: DULCOLAX  Place 1 suppository (10 mg total) rectally daily as needed.     ciprofloxacin 250 mg/5 ml  Commonly known as: CIPRO  Take 15 mLs (750 mg total) by mouth every 12 (twelve) hours. By PEG for 7 days     clopidogreL 75 mg tablet  Commonly known as: PLAVIX  1 tablet (75 mg total) by Per G Tube route once daily.     enoxaparin 40 mg/0.4 mL Syrg  Commonly known as: LOVENOX  Inject 0.4 mLs (40 mg total) into the skin once daily.     folic acid 1 MG tablet  Commonly known as: FOLVITE  1 tablet (1 mg total) by Per G Tube route once daily.     guaiFENesin 100 mg/5 ml 100 mg/5 mL syrup  Commonly known as: ROBITUSSIN  10 mLs (200 mg total) by Per G Tube route  every 4 (four) hours. for 10 days     melatonin 3 mg tablet  Commonly known as: MELATIN  2 tablets (6 mg total) by Per G Tube route nightly.     metoprolol tartrate 100 MG tablet  Commonly known as: LOPRESSOR  1 tablet (100 mg total) by Per G Tube route 2 (two) times daily.     oxyCODONE 5 mg/5 mL Soln  Commonly known as: ROXICODONE  5 mLs (5 mg total) by Per G Tube route every 4 (four) hours as needed.     polyethylene glycol 17 gram Pwpk  Commonly known as: GLYCOLAX  17 g by Per G Tube route 2 (two) times daily.     senna-docusate 8.6-50 mg 8.6-50 mg per tablet  Commonly known as: PERICOLACE  1 tablet by Per G Tube route 2 (two) times daily.     sodium chloride 0.65 % nasal spray  Commonly known as: OCEAN  1 spray by Nasal route as needed for Congestion.     sodium chloride 3% 3 % nebulizer solution  Take 4 mLs by nebulization every 8 (eight) hours.     thiamine 100 MG tablet  1 tablet (100 mg total) by Per G Tube route once daily.        CHANGE how you take these medications    atorvastatin 20 MG tablet  Commonly known as: LIPITOR  1 tablet (20 mg total) by Per G Tube route once daily.  What changed: how to take this     losartan 50 MG tablet  Commonly known as: COZAAR  1 tablet (50 mg total) by Per G Tube route once daily.  What changed:   · medication strength  · how much to take  · how to take this        STOP taking these medications    amLODIPine 10 MG tablet  Commonly known as: NORVASC     ANORO ELLIPTA 62.5-25 mcg/actuation Dsdv  Generic drug: umeclidinium-vilanteroL     aspirin 81 MG EC tablet  Commonly known as: ECOTRIN  Replaced by: aspirin 81 MG Chew     cloNIDine 0.1 MG tablet  Commonly known as: CATAPRES     co-enzyme Q-10 50 mg capsule     hydrALAZINE 25 MG tablet  Commonly known as: APRESOLINE     metoprolol succinate 200 MG 24 hr tablet  Commonly known as: TOPROL-XL     multivitamin capsule     ondansetron 4 MG Tbdl  Commonly known as: ZOFRAN-ODT     VENTOLIN HFA 90 mcg/actuation inhaler  Generic  drug: albuterol           Where to Get Your Medications      Information about where to get these medications is not yet available    Ask your nurse or doctor about these medications  · acetaminophen 325 MG tablet  · albuterol-ipratropium 2.5 mg-0.5 mg/3 mL nebulizer solution  · aspirin 81 MG Chew  · atorvastatin 20 MG tablet  · bisacodyL 10 mg Supp  · ciprofloxacin 250 mg/5 ml  · clopidogreL 75 mg tablet  · enoxaparin 40 mg/0.4 mL Syrg  · folic acid 1 MG tablet  · guaiFENesin 100 mg/5 ml 100 mg/5 mL syrup  · losartan 50 MG tablet  · melatonin 3 mg tablet  · metoprolol tartrate 100 MG tablet  · oxyCODONE 5 mg/5 mL Soln  · polyethylene glycol 17 gram Pwpk  · senna-docusate 8.6-50 mg 8.6-50 mg per tablet  · sodium chloride 0.65 % nasal spray  · sodium chloride 3% 3 % nebulizer solution  · thiamine 100 MG tablet         Disposition: rehab    Instructions:   Diet NPO, tube feeds via PEG   Lifting restrictions    Order Comments:  No heavy lifting (nothing greater than 20 lbs), no stooping   Call MD for: temperature >100.4    Call MD for: redness, tenderness, or signs of infection (pain, swelling, redness, odor or green/yellow discharge around incision site)    Change dressing (specify)    Order Comments:       Follow up in 1 week  Call our office at [phone] for any problems or questions

## 2022-01-24 NOTE — RESPIRATORY THERAPY
RAPID RESPONSE RESPIRATORY THERAPY PROACTIVE NOTE           Time of visit: 930    Code Status: Full Code   : 1950  Bed: [address] [place] [address] A:   MRN: [id number]  Time spent at the bedside: < 15 min    SITUATION    Evaluated patient for: LDA Check     BACKGROUND    Patient has a past medical history of Cancer, COPD (chronic obstructive pulmonary disease), Hyperlipidemia, Hypertension, and Pseudoaneurysm.  Clinically Significant Surgical Hx: tracheostomy    24 Hours Vitals Range:  Temp:  [97.4 °F (36.3 °C)-98.3 °F (36.8 °C)]   Pulse:  [67-90]   Resp:  [18-20]   BP: (117-151)/(61-81)   SpO2:  [94 %-100 %]     Labs:    No results for input(s): NA, K, CL, CO2, CREATININE, GLU, PHOS, MG in the last 72 hours.    Invalid input(s): CMP,  BUN, TBIL     No results for input(s): PH, PCO2, PO2, HCO3, POCSATURATED, BE in the last 72 hours.    ASSESSMENT/INTERVENTIONS    Upon arrival in room pt resting and has no resp concerns at this time      Last VS   Temp: 97.8 °F (36.6 °C) (809)  Pulse: 84 (856)  Resp: 19 (856)  BP: 123/71 (809)  SpO2: 95 % (856)    Level of Consciousness: Level of Consciousness (AVPU): alert  Respiratory Effort: Respiratory Effort: Normal,Unlabored,Mild Expansion/Accessory Muscle Usage: Expansion/Accessory Muscles/Retractions: no retractions,no use of accessory muscles,expansion symmetric  All Lung Field Breath Sounds: All Lung Fields Breath Sounds: Anterior:,Lateral:,diminished  DEE Breath Sounds: (P) Anterior:,Posterior:,Lateral:,coarse,diminished  LLL Breath Sounds: (P) Anterior:,Posterior:,Lateral:,coarse,diminished  RUL Breath Sounds: (P) Anterior:,Posterior:,Lateral:,coarse,diminished  RML Breath Sounds: (P) Anterior:,coarse,diminished  RLL Breath Sounds: Anterior:,Posterior:,Lateral:,diminished,coarse  O2 Device/Concentration: Flow (L/min): 5, Oxygen Concentration (%): 28, O2 Device (Oxygen Therapy): Trach Collar  Surgical airway: Yes, Type: Shiley Size: 6   Ambu at  bedside: Ambu bag with the patient?: Yes, Adult Ambu     Active Orders   Respiratory Care    Chest physiotherapy TID     Frequency: TID     Number of Occurrences: Until Specified     Order Questions:      Indications: COPIOUS SPUTUM PRODUCTION    Inhalation Treatment Q4H PRN     Frequency: Q4H PRN     Number of Occurrences: Until Specified    Oxygen Continuous     Frequency: Continuous     Number of Occurrences: Until Specified     Order Comments: Patient uses 2L at night while at home       Order Questions:      Device type: Low flow      Device: Trach Collar      FiO2%: 35      Titrate O2 per Oxygen Titration Protocol: Yes      To maintain SpO2 goal of: >= 90%      Notify MD of: Inability to achieve desired SpO2; Sudden change in patient status and requires 20% increase in FiO2; Patient requires >60% FiO2    Pulse Oximetry Continuous     Frequency: Continuous     Number of Occurrences: Until Specified    Respiratory care evaluation only Q4H     Frequency: Q4H     Number of Occurrences: Until Specified    RESPIRATORY COMMUNICATION     Frequency: Q4H     Number of Occurrences: Until Specified     Order Comments: Trach care and trach teaching      Routine tracheostomy care     Frequency: BID     Number of Occurrences: Until Specified       RECOMMENDATIONS    We recommend: RRT Recs: Continue POC per primary team.    ESCALATION        FOLLOW-UP    Please call back the Rapid Response RTSu RRT at x [phone] for any questions or concerns.

## 2022-01-24 NOTE — PLAN OF CARE
Plan of care reviewed with opt:  -AAOx4, written communication  -Trach #6 Shiley in place, on 5L 28% with SpO2 at 92-95%, coughed up moderate amount thick white secretion and required to be deep suction x4 during shift  complained of stuffy nose, relieved with saline nasal spray  -VS stable  -Juan Carlos neck incision with staples, flap checked q4h WDL, right thigh with staples CDI.   -PEG tube with tube feed bolus, confirmed with  that pt should take only Impact Peptide 1.5, not Boost, pt tolerated 4 cans of food today, water bolus given  -complained of neck pain, pain under control with scheduled pain meds  -Had 1 BM with loose stool. Adequate amount of benito color urine  -Up on the chair the whole shift  -Call light in reach. Bed alarm ON. WCTM

## 2022-01-24 NOTE — PT/OT/SLP PROGRESS
Occupational Therapy      Patient Name:  Fareed Richard Jr.   MRN:  [id number]    Patient not seen today secondary to Other (Comment) (Pt attempted x2 this morning. On first attempt at 1107 pt with SLP. On second attempt at 1146, pt politely refused as pt stating he was awaiting d/c to rehab. POC and frequency/intensity discussed with pt for smoothe transition with pt acknowledgmt.). Will follow-up if pt remains hospitalized for therapy.    Gemma Salvador OT  1/24/2022

## 2022-01-24 NOTE — PLAN OF CARE
01/24/22 1026   Post-Acute Status   Post-Acute Authorization Placement   SW spoke with Julia at [facility]. She stated they can take patient today just waiting on a time to set up transport.      Kirsten Lutz LMSW  [facility]  [phone]

## 2022-01-24 NOTE — PHYSICIAN QUERY
PT Name: Fareed Richard Jr.  MR #: [id number]     Documentation Clarification      CDS/: Neyda MAIN, RN               Contact information: [email or web address]    This form is a permanent document in the medical record.     Query Date: January 24, 2022    By submitting this query, we are merely seeking further clarification of documentation. Please utilize your independent clinical judgment when addressing the question(s) below.    The Medical Record reflects the following:    Supporting Clinical Findings Location in Medical Record      CITROBACTER KOSERI   Moderate  Specimen: Respiratory from Tracheal Aspirate        PSEUDOMONAS AERUGINOSA   Few  Specimen: Respiratory from Tracheal Aspirate      Respiratory Culture collected 1/14/22      Tracheobronchitis  Tracheobronchitis growing citrobacter koseri, pseudomonas, and proteus mirabilis.        Recommendations:  -Ciprofloxacin 750mg BID through G tube for 7 days of total therapy, end date 1/23/22      Infectious Disease Consult 1/18/22                                                                 Provider, please provide diagnosis or diagnoses associated with above clinical findings.    [   ]  Pseudomonas Tracheobronchitis is a postoperative infection of the tracheostomy site.     [   ]  Pseudomonas Tracheobronchitis is a postoperative infection of the lungs.     [  x ]  Pseudomonas Tracheobronchitis infection is present in the postoperative period, but not a complication of surgery.     [   ]  Other respiratory infection (please specify): ____________     [  ]  Clinically undetermined

## 2022-01-24 NOTE — PHYSICIAN QUERY
PT Name: Fareed Richard Jr.  MR #: [id number]    DOCUMENTATION CLARIFICATION     CDS/: Neyda MAIN, RN              Contact information: [email or web address]    This form is a permanent document in the medical record.     Query Date: January 24, 2022    Dear Provider,  By submitting this query, we are merely seeking further clarification of documentation. Please utilize your independent clinical judgment when addressing the question(s) below.    The medical record contains the following:    Supporting Clinical Findings Location in Medical Record      Aerobic culture Collected: 01/16/22 1704      Specimen: Wound from Neck        Updated: 01/18/22 0822     Aerobic Bacterial Culture PROTEUS MIRABILIS        Few    Squamous cell cancer of tongue  71M with [id number] SCC of the oral tongue s/p total glossectomy, bilateral neck dissections levels I-IV, tracheostomy, and ALTFF reconstruction with G tube placement by general surgery. Doing well but with copious secretions.         ENT progress note 1/24/22       Please clarify if Proteus mirabilis neck wound infection as it relates to ALTFF reconstruction is:      [  x] A postoperative infection and a complication of the procedure   [  ] Present, but not a complication of the procedure     [  ] Incidental finding, not clinically significant     [  ] Other (please specify): __________________     [  ] Clinically Undetermined       Please document in your progress notes daily for the duration of treatment until resolved and include in your discharge summary.

## 2022-01-24 NOTE — SUBJECTIVE & OBJECTIVE
Interval History: NAEON.     Medications:  Continuous Infusions:   sodium chloride 0.9% Stopped (01/06/22 0636)     Scheduled Meds:   acetaminophen  650 mg Per G Tube Q6H    aspirin  81 mg Per G Tube Daily    atorvastatin  20 mg Per G Tube Daily    bacitracin zinc   Topical (Top) BID    ciprofloxacin 250 mg/5 ml  750 mg Per G Tube Q12H    clopidogreL  75 mg Per G Tube Daily    enoxaparin  40 mg Subcutaneous Daily    folic acid  1 mg Per G Tube Daily    guaiFENesin 100 mg/5 ml  200 mg Per G Tube Q4H    losartan  50 mg Per G Tube Daily    magnesium citrate  296 mL Per G Tube Once    melatonin  6 mg Per G Tube Nightly    metoprolol tartrate  100 mg Per G Tube BID    polyethylene glycol  17 g Per G Tube BID    senna-docusate 8.6-50 mg  1 tablet Per G Tube BID    sodium chloride 3%  4 mL Nebulization Q8H    thiamine  100 mg Per G Tube Daily     PRN Meds:sodium chloride 0.9%, albuterol-ipratropium, bisacodyL, hydrALAZINE, ondansetron, oxyCODONE, oxyCODONE, sodium chloride, sodium chloride 0.9%, sodium chloride 0.9%     Review of patient's allergies indicates:  No Known Allergies  Objective:     Vital Signs (24h Range):  Temp:  [97.4 °F (36.3 °C)-98.5 °F (36.9 °C)] 97.9 °F (36.6 °C)  Pulse:  [67-90] 74  Resp:  [16-20] 18  SpO2:  [94 %-100 %] 95 %  BP: (117-151)/(61-81) 133/67       Lines/Drains/Airways     Drain                 Gastrostomy/Enterostomy 01/04/22 Percutaneous endoscopic gastrostomy (PEG) LUQ 20 days          Airway                 Surgical Airway 01/04/22 1546 Shiley Cuffed 19 days          Peripheral Intravenous Line                 Peripheral IV - Single Lumen 01/21/22 1553 20 G;1 3/4 in Anterior;Left Forearm 2 days                Physical Exam    Awake and alert, NAD  NC/AT, EOMI  Normal external nose   MMM, rosemary-tongue ALTFF in place with good color that approximates the thigh, good tugar and warm, intra-oral sutures intact with no breakdown and dehiscence  Neck soft, advancement flaps in  place with areas of epidermolysis, staples intact  6-0 cuffless trach in place and secured with sherry collar, aerating well  Normal work of breathing, on trach collar, thick secretions  G tube in place      Significant Labs:  BMP:   Recent Labs   Lab 01/19/22  0558      CL 99   CO2 27   BUN 11   CREATININE 0.6   CALCIUM 9.1     CBC:   Recent Labs   Lab 01/19/22  0558   WBC 7.73   RBC 2.79*   HGB 8.0*   HCT 26.4*   *   MCV 95   MCH 28.7   MCHC 30.3*     Microbiology Results (last 7 days)     Procedure Component Value Units Date/Time    Culture, Anaerobe [[id number]] Collected: 01/16/22 1704    Order Status: Completed Specimen: Wound from Neck Updated: 01/24/22 0651     Anaerobic Culture No anaerobes isolated    Culture, Respiratory with Gram Stain [[id number]]  (Abnormal)  (Susceptibility) Collected: 01/14/22 1543    Order Status: Completed Specimen: Respiratory from Tracheal Aspirate Updated: 01/18/22 0843     Respiratory Culture No S aureus isolated.      CITROBACTER KOSERI  Moderate        PSEUDOMONAS AERUGINOSA  Few  Normal respiratory ishmael also present       Gram Stain (Respiratory) <10 epithelial cells per low power field.     Gram Stain (Respiratory) Moderate WBC's     Gram Stain (Respiratory) Many Gram positive cocci      Gram Stain (Respiratory) Few Gram negative rods    Aerobic culture [[id number]]  (Abnormal)  (Susceptibility) Collected: 01/16/22 1704    Order Status: Completed Specimen: Wound from Neck Updated: 01/18/22 0822     Aerobic Bacterial Culture PROTEUS MIRABILIS  Few            Significant Diagnostics:  I have reviewed and interpreted all pertinent imaging results/findings within the past 24 hours.

## 2022-01-24 NOTE — PLAN OF CARE
Plan of care reviewed with patient questions and concerns addressed and verbalized understanding.  No acute events overnight.  All vital signs stable. C/o neck incisional pain. Telemetry= NSR 60-70s.   AAOx4.  Safety and fall precautions maintained. Right thigh staples intact, neck staples intact. Flap checks Q4hrs, strong pulses on doppler,soft. SpO2=96-95% on 28% trach collar. Patient refused 5th can of feeding due to feeling of fullness. Physically Mobilized to their highest level of functioning.  Actively progressing towards goals.  See flow sheet for further information.  Will continue to monitor.

## 2022-01-24 NOTE — PT/OT/SLP PROGRESS
Speech Language Pathology Treatment    Patient Name:  Fareed Richard Jr.   MRN:  [id number]  Admitting Diagnosis: Squamous cell cancer of tongue    Recommendations:                 General Recommendations:  Dysphagia therapy and One Way Speaking Valve  Diet recommendations:  NPO, Liquid Diet Level: NPO   Aspiration Precautions: Strict aspiration precautions   General Precautions: Standard, aspiration,fall,NPO  Communication strategies:  One way speaking valve    Subjective     Awake/alert    Pain/Comfort:  Pain Rating 1: 0/10  Pain Rating Post-Intervention 2: 0/10    Respiratory Status: trach collar    Objective:     Has the patient been evaluated by SLP for swallowing?   Yes  Keep patient NPO? Yes     Passy Jaimie Speaking Valve  Trach size/type: Shiley 6 cuffless  O2 sats at rest:97%  O2 sats with PMSV in place:95%  Vocal quality with valve in place:adequate intensity, pt was ~60% intelligible  Back pressure upon removal: none  Minutes PMSV worn:Pt tolerated valve throughout session  Education topics addressed: cleaning valve, one-way technology, anatomy of trach, precautions with cuffed trach, removal of valve prior to sleeping, self suction using yankeur     Ice chip trials tolerated x1 with decreased bolus control, questionable intermittent swallow initiation, coughing and wet vocal quality post trials. Pt able to clear vocal quality with throat clear following trial. Pt with secretions noted from trach hub. SLP changed gauze post cough. No further PO trials provided.       Assessment:     Fareed Richard Jr. is a 71 y.o. male with an SLP diagnosis of Dysphagia and One Way Speaking Valve Training. .    Goals:   Multidisciplinary Problems     SLP Goals        Problem: SLP Goal    Goal Priority Disciplines Outcome   SLP Goal     SLP Ongoing, Progressing   Description: Speech Language Pathology Goals  Goals expected to be met by 1/26    1. Pt will tolerate PMSV for 5 minutes with >92% spO2 to increase phonation,  respiration and swallowing aspects  2. Pt/family will don/doff PMSV x1 during session with min cues  3. Pt will vocalize with PMSV in place to increase verbal expression.   4. Pt will participate in ongoing swallow assessment in order to determine safest least restrictive diet.                         Plan:     · Patient to be seen:  3 x/week   · Plan of Care expires:  02/01/22  · Plan of Care reviewed with:  patient   · SLP Follow-Up:  Yes       Discharge recommendations:   (tbd)       Time Tracking:     SLP Treatment Date:   01/24/22  Speech Start Time:  1055  Speech Stop Time:  1115     Speech Total Time (min):  20 min    Billable Minutes: Speech Therapy Individual 6 and Treatment Swallowing Dysfunction 7    01/24/2022

## 2022-01-25 LAB
FINAL PATHOLOGIC DIAGNOSIS: NORMAL
FROZEN SECTION DIAGNOSIS: NORMAL
GROSS: NORMAL
Lab: NORMAL
MICROSCOPIC EXAM: NORMAL
SUPPLEMENTAL DIAGNOSIS: NORMAL

## 2022-01-25 NOTE — PLAN OF CARE
[facility]  Discharge Final Note    Primary Care Provider: Kodi Tubbs MD    Expected Discharge Date: 1/24/2022       Patient discharged to [facility] 1/24/2023.    Future Appointments   Date Time Provider Department Center   1/26/2022  2:30 PM Tasha Chaudhry NP [facility]         Final Discharge Note (most recent)     Final Note - 01/25/22 0708        Final Note    Assessment Type Final Discharge Note     Anticipated Discharge Disposition Rehab Facility        Post-Acute Status    Post-Acute Authorization Placement     Post-Acute Placement Status Set-up Complete/Auth obtained                 Important Message from Medicare  Important Message from Medicare regarding Discharge Appeal Rights: Other (comments) ([facility])     Date IMM was signed: 01/24/22  Time IMM was signed: 102

## 2022-01-25 NOTE — PLAN OF CARE
POC reviewed with patient. AAOX4. VSS on 5L 28% trach collar. #6 shiley trach. Pt. Does cough up secretions required little suctioning per nurse. Neck staples C/D/I. Bacitracin applied to staples, betadine as well. R. Thigh suzy C/D/I. Peg tube in place. Bolus feeds given X2. All meds given through peg tube. Pain controlled with scheduled tylenol. No nausea. Voided per urinal. X1 BM liquid via BC. NPO. X1 assist. Up in chair throughout shift. Q4 Flap checks WDL.     Report called to [facility]. Awaiting transport to arrive for pickup.  time scheduled for 1700.

## 2022-01-26 ENCOUNTER — PATIENT MESSAGE (OUTPATIENT)
Dept: OTOLARYNGOLOGY | Facility: CLINIC | Age: 72
End: 2022-01-26

## 2022-01-26 ENCOUNTER — TELEPHONE (OUTPATIENT)
Dept: OTOLARYNGOLOGY | Facility: CLINIC | Age: 72
End: 2022-01-26
Payer: MEDICARE

## 2022-02-10 ENCOUNTER — OFFICE VISIT (OUTPATIENT)
Dept: OTOLARYNGOLOGY | Facility: CLINIC | Age: 72
End: 2022-02-10
Payer: MEDICARE

## 2022-02-10 VITALS — HEART RATE: 79 BPM | SYSTOLIC BLOOD PRESSURE: 149 MMHG | DIASTOLIC BLOOD PRESSURE: 88 MMHG

## 2022-02-10 DIAGNOSIS — C02.9 SQUAMOUS CELL CANCER OF TONGUE: Primary | ICD-10-CM

## 2022-02-10 PROCEDURE — 4010F ACE/ARB THERAPY RXD/TAKEN: CPT | Mod: CPTII,S$GLB,, | Performed by: OTOLARYNGOLOGY

## 2022-02-10 PROCEDURE — 3288F FALL RISK ASSESSMENT DOCD: CPT | Mod: CPTII,S$GLB,, | Performed by: OTOLARYNGOLOGY

## 2022-02-10 PROCEDURE — 1126F PR PAIN SEVERITY QUANTIFIED, NO PAIN PRESENT: ICD-10-PCS | Mod: CPTII,S$GLB,, | Performed by: OTOLARYNGOLOGY

## 2022-02-10 PROCEDURE — 99024 PR POST-OP FOLLOW-UP VISIT: ICD-10-PCS | Mod: S$GLB,,, | Performed by: OTOLARYNGOLOGY

## 2022-02-10 PROCEDURE — 3077F PR MOST RECENT SYSTOLIC BLOOD PRESSURE >= 140 MM HG: ICD-10-PCS | Mod: CPTII,S$GLB,, | Performed by: OTOLARYNGOLOGY

## 2022-02-10 PROCEDURE — 1159F MED LIST DOCD IN RCRD: CPT | Mod: CPTII,S$GLB,, | Performed by: OTOLARYNGOLOGY

## 2022-02-10 PROCEDURE — 3288F PR FALLS RISK ASSESSMENT DOCUMENTED: ICD-10-PCS | Mod: CPTII,S$GLB,, | Performed by: OTOLARYNGOLOGY

## 2022-02-10 PROCEDURE — 1101F PR PT FALLS ASSESS DOC 0-1 FALLS W/OUT INJ PAST YR: ICD-10-PCS | Mod: CPTII,S$GLB,, | Performed by: OTOLARYNGOLOGY

## 2022-02-10 PROCEDURE — 99999 PR PBB SHADOW E&M-EST. PATIENT-LVL III: CPT | Mod: PBBFAC,,, | Performed by: OTOLARYNGOLOGY

## 2022-02-10 PROCEDURE — 99024 POSTOP FOLLOW-UP VISIT: CPT | Mod: S$GLB,,, | Performed by: OTOLARYNGOLOGY

## 2022-02-10 PROCEDURE — 4010F PR ACE/ARB THEARPY RXD/TAKEN: ICD-10-PCS | Mod: CPTII,S$GLB,, | Performed by: OTOLARYNGOLOGY

## 2022-02-10 PROCEDURE — 99999 PR PBB SHADOW E&M-EST. PATIENT-LVL III: ICD-10-PCS | Mod: PBBFAC,,, | Performed by: OTOLARYNGOLOGY

## 2022-02-10 PROCEDURE — 1126F AMNT PAIN NOTED NONE PRSNT: CPT | Mod: CPTII,S$GLB,, | Performed by: OTOLARYNGOLOGY

## 2022-02-10 PROCEDURE — 3079F DIAST BP 80-89 MM HG: CPT | Mod: CPTII,S$GLB,, | Performed by: OTOLARYNGOLOGY

## 2022-02-10 PROCEDURE — 1159F PR MEDICATION LIST DOCUMENTED IN MEDICAL RECORD: ICD-10-PCS | Mod: CPTII,S$GLB,, | Performed by: OTOLARYNGOLOGY

## 2022-02-10 PROCEDURE — 3079F PR MOST RECENT DIASTOLIC BLOOD PRESSURE 80-89 MM HG: ICD-10-PCS | Mod: CPTII,S$GLB,, | Performed by: OTOLARYNGOLOGY

## 2022-02-10 PROCEDURE — 3077F SYST BP >= 140 MM HG: CPT | Mod: CPTII,S$GLB,, | Performed by: OTOLARYNGOLOGY

## 2022-02-10 PROCEDURE — 1101F PT FALLS ASSESS-DOCD LE1/YR: CPT | Mod: CPTII,S$GLB,, | Performed by: OTOLARYNGOLOGY

## 2022-02-10 RX ORDER — AMLODIPINE BESYLATE 10 MG/1
10 TABLET ORAL DAILY
Status: ON HOLD | COMMUNITY
Start: 2022-01-21 | End: 2022-05-12 | Stop reason: HOSPADM

## 2022-02-10 NOTE — Clinical Note
Oral tongue cancer status post surgery. Will need CRT. Please have him see med and rad onc. Thanks.

## 2022-02-11 NOTE — PROGRESS NOTES
Fareed WILLIAM Jose Manuel Grande presents roughly 6 weeks status post total glossectomy, bilateral neck dissection, bilateral cervical facial advancement flaps and anterolateral thigh free flap reconstruction of his glossectomy defect.  He is doing reasonably well.  He will soon be discharged for nursing home.    On exam, his incisions are healing nicely.  There is mild crusting at the superior aspect of the bilateral cervical facial advancement flaps.  His free flap is healthy.  His trach is in good position.    Assessment plan:  Doing well.  He will need adjuvant chemoradiation.  Will set up Radiation and Medical Oncology evaluations.  Return to clinic with us in roughly 3 weeks.

## 2022-02-14 ENCOUNTER — TELEPHONE (OUTPATIENT)
Dept: HEMATOLOGY/ONCOLOGY | Facility: CLINIC | Age: 72
End: 2022-02-14
Payer: MEDICARE

## 2022-02-14 NOTE — TELEPHONE ENCOUNTER
"----- Message from Tequila Gifford sent at 2/14/2022  8:04 AM CST -----  Regarding: Squamous cell cancer of tongue [C02.9]  New Cancer Patient Intake Documentation    Diagnosis:Squamous cell cancer of tongue [C02.9]    Date patient referral received:    Records collected:    Questions asked:  What doctor have you seen for this diagnosis?Naeem Smalls MD in [facility]ECK [facility]GICAL ONCOLOGY    What imaging have you had? 9 image reports from 12/16-1/16 [facility]  Date of imaging:  Where did that occur? [facility]    Have you been diagnosed with cancer by a biopsy and/or surgery? Contains abnormal data Cytology -[facility]-Pathologist performed 12/13/21;    11 bx taken on 1/4/22 including TOTAL GLOSSECTOMY   Date of biopsy:  Date of surgery:  Where did that occur? [facility]     Are there any days or times of day that do not work for you for an appointment?      Other pertinent info: "He will need adjuvant chemoradiation.  Will set up Radiation and Medical Oncology evaluations. "    Date/time of Navigator Visit:       "

## 2022-02-14 NOTE — TELEPHONE ENCOUNTER
Called & spoke with pt's wife, scheduled to see medical & radiation oncology this Wednesday. Reviewed appts & confirmed. All questions answered at this time & provided my direct number should they need anything.

## 2022-02-15 NOTE — PROGRESS NOTES
[facility] Consult Note    Referring provider: Naeem Smalls MD    Diagnosis:  Fareed Richard Jr. is a 72 y.o. 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   · 12/13/21: met with head and neck surgery, with 3.5cm ulcerated left anteriolateral tongue mass. No buccal or FOM lesions. 3cm, firm, minimally mobile left submandibular adenopathy. No facial weakness.  · FNA: of left neck mass with metastatic squamous cell carcinoma  · 1/4/22: DL, Trach, total glossectomy, bilateral neck dissection of 1-4 and resection of left submandibular cervical skin  · Op note: extensive submucosal involvement of the vast majority of the tongue. DL with no other lesions.  Lingual nerve was identified.  Was taken to the skull base and found to be negative for carcinoma  · Path: 6 cm primary, with 29 mm DOI, with invasion of the left neck soft tissue, submandibular gland and cervical skin. - SM , extensive PNI. 3/68 LN+ (ITC in left lvl II-IV, 1/5LN+ in left IB, with SALINAS+  · 1/4/22: anterolateral thigh free flap and advancement flap. Post op stay was complicated by positive respiratory cultures requiring ABx. He was transferred to rehab.     History of Present Illness:  Fareed Richard Jr. presents today to discuss adjuvant CRT. He is being released from rehab today. The lesion was first noticed in November and progressed rapidly to the point of skin erythema overlying the left submandibular skin. He is taking in all nutrition via PEG. He used to smoke but has stopped. No natural dentition. No hemoptysis, otalgia. Is able to manage secretions but is not taking any food or fluids PO. No facial numbness or weakness at this time or prior to his surgery. He has weight loss but is taking in 6 cans of  tube feeds a day per his wife. He follows with nutrition.  Denies prior head and neck radiation or prior skin radiation.    Review of Systems:  Review of Systems   Constitutional: Positive for weight loss. Negative for fever.   HENT: Negative for ear pain and sore throat.    Respiratory: Positive for cough (chronic) and shortness of breath (chronic). Negative for stridor.    Cardiovascular: Negative for chest pain.   Gastrointestinal: Negative for abdominal pain.   Neurological: Negative for dizziness, focal weakness and headaches.       Social History:  Social History     Tobacco Use    Smoking status: Former Smoker     Packs/day: 2.00     Years: 40.00     Pack years: 80.00     Types: Cigarettes     Start date: 4/17/1963     Quit date: 4/17/2018     Years since quitting: 3.8    Smokeless tobacco: Never Used    Tobacco comment: 3/3 ppd x 40 yrs. Currently 3-4 cigarettes daily .He is trying  to quit. Is using a Vapor cigarettes  2-3 x's a day.   Substance Use Topics    Alcohol use: Yes     Alcohol/week: 3.0 standard drinks     Types: 3 Cans of beer per week     Comment: beer daily 3-4    Drug use: No       Past Medical History:  Past Medical History:   Diagnosis Date    Cancer     skin to Rt forearm and face    COPD (chronic obstructive pulmonary disease)     Hyperlipidemia     Hypertension     Pseudoaneurysm        Past Surgical History:   Procedure Laterality Date    ABDOMINAL SURGERY      stents placed in liver and large intestines, per patient    CAROTID STENT      CORONARY STENT PLACEMENT  01/2000    DISSECTION OF NECK Bilateral 1/4/2022    Procedure: DISSECTION, NECK;  Surgeon: Naeem Smalls MD;  Location: [address];  Service: ENT;  Laterality: Bilateral;    ESOPHAGOGASTRODUODENOSCOPY W/ PEG N/A 1/4/2022    Procedure: EGD, WITH PEG TUBE INSERTION;  Surgeon: Anthony Calabrese MD;  Location: [facility] OR [address] [place] [address];  Service: General;  Laterality: N/A;    EYE SURGERY      Cataract bilateral     femoral stents      bilateral    FLAP PROCEDURE N/A 1/3/2022    Procedure: CREATION, FREE FLAP;  Surgeon: Naeem Smalls MD;  Location: [facility] OR 2ND FLR;  Service: ENT;  Laterality: N/A;    FLAP PROCEDURE Right 1/4/2022    Procedure: CREATION, FREE FLAP;  Surgeon: Stacey Conde MD;  Location: [facility] OR 2ND FLR;  Service: ENT;  Laterality: Right;  Ischemic start 1203  Ischemic stop 1353    GLOSSECTOMY N/A 1/4/2022    Procedure: GLOSSECTOMY;  Surgeon: Naeem Smalls MD;  Location: [facility] OR 2ND FLR;  Service: ENT;  Laterality: N/A;    RADICAL NECK DISSECTION Left 1/3/2022    Procedure: DISSECTION, NECK, RADICAL;  Surgeon: Naeem Smalls MD;  Location: [facility] OR 2ND FLR;  Service: ENT;  Laterality: Left;    SKIN CANCER EXCISION      TRACHEOTOMY N/A 1/4/2022    Procedure: TRACHEOTOMY;  Surgeon: Naeem Smalls MD;  Location: [facility] OR 2ND FLR;  Service: ENT;  Laterality: N/A;       Cancer-related family history is not on file.    Medications:  Current Outpatient Medications on File Prior to Visit   Medication Sig Dispense Refill    acetaminophen (TYLENOL) 325 MG tablet 2 tablets (650 mg total) by Per G Tube route every 6 (six) hours.  0    albuterol-ipratropium (DUO-NEB) 2.5 mg-0.5 mg/3 mL nebulizer solution Take 3 mLs by nebulization every 4 (four) hours as needed for Wheezing. Rescue 75 mL 0    amLODIPine (NORVASC) 10 MG tablet Take 10 mg by mouth once daily.      aspirin 81 MG Chew 1 tablet (81 mg total) by Per G Tube route once daily.  0    atorvastatin (LIPITOR) 20 MG tablet 1 tablet (20 mg total) by Per G Tube route once daily. 90 tablet 3    bisacodyL (DULCOLAX) 10 mg Supp Place 1 suppository (10 mg total) rectally daily as needed.  0    clopidogreL (PLAVIX) 75 mg tablet 1 tablet (75 mg total) by Per G Tube route once daily. (Patient not taking: No sig reported) 30 tablet 11    enoxaparin (LOVENOX) 40 mg/0.4 mL Syrg Inject 0.4 mLs (40 mg total) into the skin once daily. (Patient  "not taking: No sig reported)      folic acid (FOLVITE) 1 MG tablet 1 tablet (1 mg total) by Per G Tube route once daily. (Patient not taking: No sig reported)  0    losartan (COZAAR) 50 MG tablet 1 tablet (50 mg total) by Per G Tube route once daily. 90 tablet 3    melatonin (MELATIN) 3 mg tablet 2 tablets (6 mg total) by Per G Tube route nightly. (Patient not taking: No sig reported)  0    metoprolol tartrate (LOPRESSOR) 100 MG tablet 1 tablet (100 mg total) by Per G Tube route 2 (two) times daily. 60 tablet 11    oxyCODONE (ROXICODONE) 5 mg/5 mL Soln 5 mLs (5 mg total) by Per G Tube route every 4 (four) hours as needed. (Patient not taking: No sig reported)  0    polyethylene glycol (GLYCOLAX) 17 gram PwPk 17 g by Per G Tube route 2 (two) times daily. (Patient not taking: No sig reported)  0    senna-docusate 8.6-50 mg (PERICOLACE) 8.6-50 mg per tablet 1 tablet by Per G Tube route 2 (two) times daily. (Patient not taking: No sig reported)      sodium chloride (OCEAN) 0.65 % nasal spray 1 spray by Nasal route as needed for Congestion. (Patient not taking: No sig reported)  12    sodium chloride 3% 3 % nebulizer solution Take 4 mLs by nebulization every 8 (eight) hours.      thiamine 100 MG tablet 1 tablet (100 mg total) by Per G Tube route once daily. (Patient not taking: No sig reported)       No current facility-administered medications on file prior to visit.       Allergies:  Review of patient's allergies indicates:  No Known Allergies    Exam:  Vitals:    02/16/22 1112   BP: (!) 179/74   BP Location: Right arm   Patient Position: Sitting   Pulse: 72   Resp: 16   Temp: 98.2 °F (36.8 °C)   SpO2: 95%   Weight: 59.5 kg (131 lb 3.2 oz)   Height: 5' 8" (1.727 m)     Constitutional: Pleasant 72 y.o. male in no acute distress.  Well nourished. Well groomed. In a wheelchair  HEENT: s/p total glossectomy with flap reconstruction flap appears well healed. No appreciated buccal or oropharyngeal lesions on direct " exam. Has some saliva pooling posterior to the flap. Neck with healing surgical incisions and post neck dissection changes. S/p resection of skin of left submandibular skin without surrounding erythema. S/p trach. Edentulous.  Cardiovascular: Upper extremities warm to touch  Lungs: No audible wheezing.  Normal effort.  Abd: soft + PEG tube  Musculoskeletal: No gross MSK deformities.  Skin: No rashes appreciated.  Psych: Alert and oriented with appropriate mood and affect.  Neuro: Grossly normal.    Data Review:  Independent Interpretation of Test(s): CT neck from 12/6/21 was personally reviewed and shows left anterior tongue lesion at least to midline. Large left IB lymph node approximating the skin. subcm bilateral lymphadenopathy. CT chest with enlarged 4R node, that appears slightly enlarged (9.7 cm on CTA 2017).    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   ECOG: (2) Ambulatory and capable of self care, unable to carry out work activity, up and about > 50% or waking hours    Possibility of pregnancy: N/A  History of prior irradiation: No  History of prior systemic anti-cancer therapy: No  History of collagen vascular disease: No  Implanted electronic device (pacer/defib/nerve stimulator): No    Plan:   Has PEG. Has met with nutrition. Denies current smoking. edentulous   Treatment options were discussed with the patient including adjuvant RT, systemic therapy, some combination thereof and no adjuvant therapy. He has T4N3b disease wherein adjuvant CRT is the standard of care. He is still weak from his surgery, and deferring adjuvant therapy was discussed. .   The risks,  benefits, scheduling, alternatives to and rationale of radiation therapy were explained in detail.     After this discussion, he elected to proceed with adjuvant RT and chemotherapy, if a candidate for chemo, per medical oncology.      HEAD & NECK INFORMED CONSENT: Acute and delayed side effects of head and neck radiation, including but not limited to fatigue, redness of the skin, desquamation, dysphagia, odynophagia, mucositis, long term difficulties with swallowing, feeding tube dependency, fibrosis, xerostomia, hypothyroidism, infection as well as rare but catastrophic injury to the spinal cord, mandible, brainstem, and carotid arteries were discussed with the patient in great detail today.    I reviewed the rationale and goal of the proposed treatment course. The radiotherapeutic procedure with associated side effects and potential complications were explained. He and his wife had the opportunity to ask questions which were answered to the best of my knowledge. The patient voiced understanding of the above and has elected to proceed with treatment. Consent form was signed and the patient was given our contact information should further questions arise.      Radiation Treatment Details:    We plan to treat the area of SALINAS to a dose of 66 Gy in 33 fractions and the operative bed and neck to 54-60 Gy in 33 fractions, using IMRT and a simultaneous integrated boost.    Zaki Brunson MD  Radiation Oncology

## 2022-02-16 ENCOUNTER — OFFICE VISIT (OUTPATIENT)
Dept: HEMATOLOGY/ONCOLOGY | Facility: CLINIC | Age: 72
End: 2022-02-16
Payer: MEDICARE

## 2022-02-16 ENCOUNTER — OFFICE VISIT (OUTPATIENT)
Dept: RADIATION ONCOLOGY | Facility: CLINIC | Age: 72
End: 2022-02-16
Payer: MEDICARE

## 2022-02-16 ENCOUNTER — LAB VISIT (OUTPATIENT)
Dept: LAB | Facility: HOSPITAL | Age: 72
End: 2022-02-16
Attending: STUDENT IN AN ORGANIZED HEALTH CARE EDUCATION/TRAINING PROGRAM
Payer: MEDICARE

## 2022-02-16 VITALS
DIASTOLIC BLOOD PRESSURE: 74 MMHG | HEART RATE: 72 BPM | HEIGHT: 68 IN | OXYGEN SATURATION: 95 % | WEIGHT: 131.19 LBS | BODY MASS INDEX: 19.88 KG/M2 | RESPIRATION RATE: 16 BRPM | SYSTOLIC BLOOD PRESSURE: 179 MMHG | TEMPERATURE: 98 F

## 2022-02-16 VITALS
OXYGEN SATURATION: 95 % | DIASTOLIC BLOOD PRESSURE: 81 MMHG | HEIGHT: 68 IN | BODY MASS INDEX: 22.43 KG/M2 | SYSTOLIC BLOOD PRESSURE: 178 MMHG | HEART RATE: 69 BPM | RESPIRATION RATE: 20 BRPM

## 2022-02-16 DIAGNOSIS — C77.0 SECONDARY MALIGNANT NEOPLASM OF CERVICAL LYMPH NODE: ICD-10-CM

## 2022-02-16 DIAGNOSIS — D64.9 NORMOCYTIC ANEMIA: ICD-10-CM

## 2022-02-16 DIAGNOSIS — R11.0 CHEMOTHERAPY-INDUCED NAUSEA: ICD-10-CM

## 2022-02-16 DIAGNOSIS — E46 PROTEIN-CALORIE MALNUTRITION, UNSPECIFIED SEVERITY: ICD-10-CM

## 2022-02-16 DIAGNOSIS — T45.1X5A CHEMOTHERAPY-INDUCED NAUSEA: ICD-10-CM

## 2022-02-16 DIAGNOSIS — I73.9 PERIPHERAL VASCULAR DISEASE: ICD-10-CM

## 2022-02-16 DIAGNOSIS — C02.9 SQUAMOUS CELL CANCER OF TONGUE: Primary | ICD-10-CM

## 2022-02-16 DIAGNOSIS — C02.9 SQUAMOUS CELL CANCER OF TONGUE: ICD-10-CM

## 2022-02-16 DIAGNOSIS — T65.222S: ICD-10-CM

## 2022-02-16 DIAGNOSIS — J44.1 COPD EXACERBATION: ICD-10-CM

## 2022-02-16 PROBLEM — T65.222A: Status: ACTIVE | Noted: 2022-02-16

## 2022-02-16 PROBLEM — E22.2 SYNDROME OF INAPPROPRIATE SECRETION OF ANTIDIURETIC HORMONE: Status: ACTIVE | Noted: 2022-02-16

## 2022-02-16 PROBLEM — D68.59 HYPERCOAGULABLE STATE: Status: ACTIVE | Noted: 2022-02-16

## 2022-02-16 LAB
FERRITIN SERPL-MCNC: 135 NG/ML (ref 20–300)
FOLATE SERPL-MCNC: 20 NG/ML (ref 4–24)
TSH SERPL DL<=0.005 MIU/L-ACNC: 1.33 UIU/ML (ref 0.4–4)
VIT B12 SERPL-MCNC: 759 PG/ML (ref 210–950)

## 2022-02-16 PROCEDURE — 82746 ASSAY OF FOLIC ACID SERUM: CPT | Performed by: STUDENT IN AN ORGANIZED HEALTH CARE EDUCATION/TRAINING PROGRAM

## 2022-02-16 PROCEDURE — 84443 ASSAY THYROID STIM HORMONE: CPT | Performed by: STUDENT IN AN ORGANIZED HEALTH CARE EDUCATION/TRAINING PROGRAM

## 2022-02-16 PROCEDURE — 36415 COLL VENOUS BLD VENIPUNCTURE: CPT | Performed by: STUDENT IN AN ORGANIZED HEALTH CARE EDUCATION/TRAINING PROGRAM

## 2022-02-16 PROCEDURE — 99205 PR OFFICE/OUTPT VISIT, NEW, LEVL V, 60-74 MIN: ICD-10-PCS | Mod: S$GLB,ICN,, | Performed by: STUDENT IN AN ORGANIZED HEALTH CARE EDUCATION/TRAINING PROGRAM

## 2022-02-16 PROCEDURE — 99999 PR PBB SHADOW E&M-EST. PATIENT-LVL IV: ICD-10-PCS | Mod: PBBFAC,,, | Performed by: STUDENT IN AN ORGANIZED HEALTH CARE EDUCATION/TRAINING PROGRAM

## 2022-02-16 PROCEDURE — 4010F ACE/ARB THERAPY RXD/TAKEN: CPT | Mod: ,,, | Performed by: STUDENT IN AN ORGANIZED HEALTH CARE EDUCATION/TRAINING PROGRAM

## 2022-02-16 PROCEDURE — 99214 OFFICE O/P EST MOD 30 MIN: CPT | Mod: PBBFAC | Performed by: STUDENT IN AN ORGANIZED HEALTH CARE EDUCATION/TRAINING PROGRAM

## 2022-02-16 PROCEDURE — 82728 ASSAY OF FERRITIN: CPT | Performed by: STUDENT IN AN ORGANIZED HEALTH CARE EDUCATION/TRAINING PROGRAM

## 2022-02-16 PROCEDURE — 99999 PR PBB SHADOW E&M-EST. PATIENT-LVL IV: CPT | Mod: PBBFAC,,, | Performed by: STUDENT IN AN ORGANIZED HEALTH CARE EDUCATION/TRAINING PROGRAM

## 2022-02-16 PROCEDURE — 4010F PR ACE/ARB THEARPY RXD/TAKEN: ICD-10-PCS | Mod: ,,, | Performed by: STUDENT IN AN ORGANIZED HEALTH CARE EDUCATION/TRAINING PROGRAM

## 2022-02-16 PROCEDURE — 99205 OFFICE O/P NEW HI 60 MIN: CPT | Mod: S$GLB,,, | Performed by: STUDENT IN AN ORGANIZED HEALTH CARE EDUCATION/TRAINING PROGRAM

## 2022-02-16 PROCEDURE — 99214 OFFICE O/P EST MOD 30 MIN: CPT | Mod: PBBFAC,27 | Performed by: STUDENT IN AN ORGANIZED HEALTH CARE EDUCATION/TRAINING PROGRAM

## 2022-02-16 PROCEDURE — 99205 PR OFFICE/OUTPT VISIT, NEW, LEVL V, 60-74 MIN: ICD-10-PCS | Mod: S$GLB,,, | Performed by: STUDENT IN AN ORGANIZED HEALTH CARE EDUCATION/TRAINING PROGRAM

## 2022-02-16 PROCEDURE — 82607 VITAMIN B-12: CPT | Performed by: STUDENT IN AN ORGANIZED HEALTH CARE EDUCATION/TRAINING PROGRAM

## 2022-02-16 PROCEDURE — 99205 OFFICE O/P NEW HI 60 MIN: CPT | Mod: S$GLB,ICN,, | Performed by: STUDENT IN AN ORGANIZED HEALTH CARE EDUCATION/TRAINING PROGRAM

## 2022-02-16 PROCEDURE — 99417 PR PROLONGED SVC, OUTPT, W/WO DIRECT PT CONTACT,  EA ADDTL 15 MIN: ICD-10-PCS | Mod: S$PBB,,, | Performed by: STUDENT IN AN ORGANIZED HEALTH CARE EDUCATION/TRAINING PROGRAM

## 2022-02-16 PROCEDURE — 99417 PROLNG OP E/M EACH 15 MIN: CPT | Mod: S$PBB,,, | Performed by: STUDENT IN AN ORGANIZED HEALTH CARE EDUCATION/TRAINING PROGRAM

## 2022-02-16 RX ORDER — ONDANSETRON HYDROCHLORIDE 8 MG/1
8 TABLET, FILM COATED ORAL EVERY 8 HOURS PRN
Qty: 30 TABLET | Refills: 3 | Status: SHIPPED | OUTPATIENT
Start: 2022-02-16 | End: 2022-03-14

## 2022-02-16 RX ORDER — PROCHLORPERAZINE MALEATE 10 MG
10 TABLET ORAL 3 TIMES DAILY PRN
Qty: 30 TABLET | Refills: 3 | Status: SHIPPED | OUTPATIENT
Start: 2022-02-16 | End: 2022-03-14

## 2022-02-16 NOTE — ASSESSMENT & PLAN NOTE
At baseline he reports he weighs about 160 lbs.  Last weight 147 lbs.  Suspect his anemia secondary to nutrition deficit  -continue with tube feeds  -evaluate for reversible causes

## 2022-02-16 NOTE — Clinical Note
When to follow up: tbd Labs needed: tbd Images: schedule pet ct when approved (priority high/urgent) Chemotherapy Regimen: Chemo (weekly cisplatin) needs auth and scheduling when approved but need to coordinate start date with radiation oncology. Provider: Misty

## 2022-02-16 NOTE — PROGRESS NOTES
PATIENT: Fareed Richard Jr.  MRN: [id number]  DATE: 2/16/2022      Diagnosis:   1. Squamous cell cancer of tongue    2. Normocytic anemia    3. Protein-calorie malnutrition, unspecified severity     4. Chemotherapy-induced nausea    5. Syndrome of inappropriate secretion of antidiuretic hormone    6. Hypercoagulable state    7. Toxic effect of tobacco cigarette, intentional self-harm, sequela    8. Peripheral vascular disease    9. COPD exacerbation    10. Secondary malignant neoplasm of cervical lymph node        Chief Complaint: Consult      Oncologic History:    Oncologic History 1. Squamous cell carcinoma of tongue    Oncologic Treatment 1.  1/4/22 total glossectomy, bilateral neck dissection, bilateral cervical facial advancement flaps and anterolateral thigh free flap reconstruction of his glossectomy defect     Pathology Squamous cell carcinoma, poorly differentiated, p16 negative  Final pathology: pT4a:  Moderately advanced local disease.  Tumor size = 6.0 cm with DOI = 29 mm and invades adjacent structures.   pN3b:  Metastasis in single contralateral node, SALINAS(+).   pM:  Not applicable.   Superior soft tissue margin of the left neck is involved by tumor           Subjective:    Interval History: Mr. Richard is here for new patient consultation.    Initially saw Aletha Cook NP (ENT) 11/29/21 regarding non-healing left sided tongue lesion for about 6 weeks.  Associated symptoms included tongue pain, bleeding, left otalgia, weight loss from difficulty eating, and bump under left chin that enlarged and was refractory to oral antibiotics and nystatin.   History significant for skin cancer of the left cheek and right lower lip about 5-6 years ago. He reported he had a flap done with Dr. Starks. His wife notes that one of the cancers was SCC and the other was basal cell but she cannot call which was which. 12/6/21 CT neck was done (findings below). 12/15/21 he saw Dr. Smalls who performed an FNA of his left neck  mass, which finalized as squamous cell carcinoma, p16 negative.   12/16/21 CT chest without contrast was done (findings below).  1/4/22 underwent total glossectomy, bilateral neck dissection, bilateral cervical facial advancement flaps and anterolateral thigh free flap reconstruction of his glossectomy defect.  Final pathology [id number], +SALINAS (microscopic), +margin (superior soft tissue margin of left neck).    He is here today to discuss adjuvant treatment options.  He has a peg tube.  He denies any issues getting peripheral IVs or labs.  Tube feed dependent; all nutrition is via peg tube.    Former smoker, 110 pack years.    Past Medical History:   Past Medical History:   Diagnosis Date    Cancer     skin to Rt forearm and face    COPD (chronic obstructive pulmonary disease)     Hyperlipidemia     Hypertension     Pseudoaneurysm        Past Surgical HIstory:   Past Surgical History:   Procedure Laterality Date    ABDOMINAL SURGERY      stents placed in liver and large intestines, per patient    CAROTID STENT      CORONARY STENT PLACEMENT  01/2000    DISSECTION OF NECK Bilateral 1/4/2022    Procedure: DISSECTION, NECK;  Surgeon: Naeem Smalls MD;  Location: [facility] OR [address] [place] [address];  Service: ENT;  Laterality: Bilateral;    ESOPHAGOGASTRODUODENOSCOPY W/ PEG N/A 1/4/2022    Procedure: EGD, WITH PEG TUBE INSERTION;  Surgeon: Anthony Calabrese MD;  Location: [facility] OR [address] [place] [address];  Service: General;  Laterality: N/A;    EYE SURGERY      Cataract bilateral    femoral stents      bilateral    FLAP PROCEDURE N/A 1/3/2022    Procedure: CREATION, FREE FLAP;  Surgeon: Naeem Smalls MD;  Location: [facility] OR [address] [place] [address];  Service: ENT;  Laterality: N/A;    FLAP PROCEDURE Right 1/4/2022    Procedure: CREATION, FREE FLAP;  Surgeon: Stacey Conde MD;  Location: [facility] OR [address] [place] [address];  Service: ENT;  Laterality: Right;  Ischemic start 1203  Ischemic stop 1353    GLOSSECTOMY N/A 1/4/2022    Procedure: GLOSSECTOMY;  Surgeon:  Naeem Smalls MD;  Location: [address];  Service: ENT;  Laterality: N/A;    RADICAL NECK DISSECTION Left 1/3/2022    Procedure: DISSECTION, NECK, RADICAL;  Surgeon: Naeem Smalls MD;  Location: [facility] OR [address] [place] [address];  Service: ENT;  Laterality: Left;    SKIN CANCER EXCISION      TRACHEOTOMY N/A 1/4/2022    Procedure: TRACHEOTOMY;  Surgeon: Naeem Smalls MD;  Location: [facility] OR [address] [place] [address];  Service: ENT;  Laterality: N/A;       Family History: No family history on file.    Social History:  reports that he quit smoking about 3 years ago. His smoking use included cigarettes. He started smoking about 58 years ago. He has a 80.00 pack-year smoking history. He has never used smokeless tobacco. He reports current alcohol use of about 3.0 standard drinks of alcohol per week. He reports that he does not use drugs.    Allergies:  Review of patient's allergies indicates:  No Known Allergies    Medications:  Current Outpatient Medications   Medication Sig Dispense Refill    acetaminophen (TYLENOL) 325 MG tablet 2 tablets (650 mg total) by Per G Tube route every 6 (six) hours.  0    albuterol-ipratropium (DUO-NEB) 2.5 mg-0.5 mg/3 mL nebulizer solution Take 3 mLs by nebulization every 4 (four) hours as needed for Wheezing. Rescue 75 mL 0    amLODIPine (NORVASC) 10 MG tablet Take 10 mg by mouth once daily.      aspirin 81 MG Chew 1 tablet (81 mg total) by Per G Tube route once daily.  0    atorvastatin (LIPITOR) 20 MG tablet 1 tablet (20 mg total) by Per G Tube route once daily. 90 tablet 3    losartan (COZAAR) 50 MG tablet 1 tablet (50 mg total) by Per G Tube route once daily. 90 tablet 3    metoprolol tartrate (LOPRESSOR) 100 MG tablet 1 tablet (100 mg total) by Per G Tube route 2 (two) times daily. 60 tablet 11    sodium chloride 3% 3 % nebulizer solution Take 4 mLs by nebulization every 8 (eight) hours.      bisacodyL (DULCOLAX) 10 mg Supp Place 1 suppository (10 mg total) rectally daily as needed.   0    clopidogreL (PLAVIX) 75 mg tablet 1 tablet (75 mg total) by Per G Tube route once daily. (Patient not taking: No sig reported) 30 tablet 11    enoxaparin (LOVENOX) 40 mg/0.4 mL Syrg Inject 0.4 mLs (40 mg total) into the skin once daily. (Patient not taking: No sig reported)      folic acid (FOLVITE) 1 MG tablet 1 tablet (1 mg total) by Per G Tube route once daily. (Patient not taking: No sig reported)  0    melatonin (MELATIN) 3 mg tablet 2 tablets (6 mg total) by Per G Tube route nightly. (Patient not taking: No sig reported)  0    ondansetron (ZOFRAN) 8 MG tablet Take 1 tablet (8 mg total) by mouth every 8 (eight) hours as needed for Nausea. 30 tablet 3    oxyCODONE (ROXICODONE) 5 mg/5 mL Soln 5 mLs (5 mg total) by Per G Tube route every 4 (four) hours as needed. (Patient not taking: No sig reported)  0    polyethylene glycol (GLYCOLAX) 17 gram PwPk 17 g by Per G Tube route 2 (two) times daily. (Patient not taking: No sig reported)  0    prochlorperazine (COMPAZINE) 10 MG tablet Take 1 tablet (10 mg total) by mouth 3 (three) times daily as needed (use when zofran does not work). 30 tablet 3    senna-docusate 8.6-50 mg (PERICOLACE) 8.6-50 mg per tablet 1 tablet by Per G Tube route 2 (two) times daily. (Patient not taking: No sig reported)      sodium chloride (OCEAN) 0.65 % nasal spray 1 spray by Nasal route as needed for Congestion. (Patient not taking: No sig reported)  12    thiamine 100 MG tablet 1 tablet (100 mg total) by Per G Tube route once daily. (Patient not taking: No sig reported)       No current facility-administered medications for this visit.       Review of Systems   Constitutional: Positive for unexpected weight change. Negative for activity change, appetite change, chills, diaphoresis, fatigue and fever.   HENT: Positive for trouble swallowing and voice change. Negative for ear pain and nosebleeds.    Eyes: Negative for visual disturbance.   Respiratory: Negative for cough, chest  "tightness, shortness of breath and wheezing.    Cardiovascular: Negative for chest pain and leg swelling.   Gastrointestinal: Negative for abdominal distention, abdominal pain, blood in stool, constipation, diarrhea, nausea and vomiting.   Endocrine: Negative for cold intolerance and heat intolerance.   Genitourinary: Negative for difficulty urinating and dysuria.   Musculoskeletal: Negative for arthralgias and back pain.   Skin: Negative for color change.   Neurological: Positive for weakness. Negative for dizziness, light-headedness, numbness and headaches.   Hematological: Negative for adenopathy. Does not bruise/bleed easily.   Psychiatric/Behavioral: Negative for confusion.       ECOG Performance Status:     ECOG SCORE    2 - Capable of all selfcare but unable to carry out any work activities, active > 50% of hours         Objective:      Vitals:   Vitals:    02/16/22 1000   BP: (!) 178/81   Pulse: 69   Resp: 20   SpO2: 95%   Height: 5' 8" (1.727 m)     BMI: Body mass index is 22.43 kg/m².    Physical Exam  Constitutional:       General: He is not in acute distress.     Appearance: Normal appearance. He is ill-appearing.   HENT:      Head: Normocephalic and atraumatic.      Mouth/Throat:      Pharynx: No oropharyngeal exudate or posterior oropharyngeal erythema.   Eyes:      General: No scleral icterus.     Extraocular Movements: Extraocular movements intact.      Conjunctiva/sclera: Conjunctivae normal.      Pupils: Pupils are equal, round, and reactive to light.   Neck:      Comments: +trach  Cardiovascular:      Rate and Rhythm: Normal rate and regular rhythm.      Heart sounds: No murmur heard.  No friction rub. No gallop.    Pulmonary:      Effort: Pulmonary effort is normal. No respiratory distress.      Breath sounds: No stridor. No wheezing, rhonchi or rales.   Abdominal:      General: Bowel sounds are normal. There is no distension.      Palpations: Abdomen is soft. There is no mass.      Tenderness: " There is no abdominal tenderness. There is no guarding or rebound.      Comments: +peg tube site c/d/i   Musculoskeletal:         General: Normal range of motion.      Cervical back: Normal range of motion and neck supple.      Right lower leg: No edema.      Left lower leg: No edema.   Skin:     General: Skin is warm and dry.   Neurological:      General: No focal deficit present.      Mental Status: He is alert.      Motor: Weakness present.         Laboratory Data: Results for PRANAV TRIPLETT JR. (MRN [id number]) as of 2/16/2022 17:54   Ref. Range 1/19/2022 05:58   WBC Latest Ref Range: 3.90 - 12.70 K/uL 7.73   RBC Latest Ref Range: 4.60 - 6.20 M/uL 2.79 (L)   Hemoglobin Latest Ref Range: 14.0 - 18.0 g/dL 8.0 (L)   Hematocrit Latest Ref Range: 40.0 - 54.0 % 26.4 (L)   MCV Latest Ref Range: 82 - 98 fL 95   MCH Latest Ref Range: 27.0 - 31.0 pg 28.7   MCHC Latest Ref Range: 32.0 - 36.0 g/dL 30.3 (L)   RDW Latest Ref Range: 11.5 - 14.5 % 14.2   Platelets Latest Ref Range: 150 - 450 K/uL 581 (H)   MPV Latest Ref Range: 9.2 - 12.9 fL 8.6 (L)   Gran % Latest Ref Range: 38.0 - 73.0 % 70.0   Lymph % Latest Ref Range: 18.0 - 48.0 % 8.8 (L)   Mono % Latest Ref Range: 4.0 - 15.0 % 12.8   Eosinophil % Latest Ref Range: 0.0 - 8.0 % 6.6   Basophil % Latest Ref Range: 0.0 - 1.9 % 0.9   Immature Granulocytes Latest Ref Range: 0.0 - 0.5 % 0.9 (H)   Gran # (ANC) Latest Ref Range: 1.8 - 7.7 K/uL 5.4   Lymph # Latest Ref Range: 1.0 - 4.8 K/uL 0.7 (L)   Mono # Latest Ref Range: 0.3 - 1.0 K/uL 1.0   Eos # Latest Ref Range: 0.0 - 0.5 K/uL 0.5   Baso # Latest Ref Range: 0.00 - 0.20 K/uL 0.07   Immature Grans (Abs) Latest Ref Range: 0.00 - 0.04 K/uL 0.07 (H)   nRBC Latest Ref Range: 0 /100 WBC 0   Differential Method Unknown Automated   Sodium Latest Ref Range: 136 - 145 mmol/L 138   Potassium Latest Ref Range: 3.5 - 5.1 mmol/L 3.8   Chloride Latest Ref Range: 95 - 110 mmol/L 99   CO2 Latest Ref Range: 23 - 29 mmol/L 27   Anion Gap Latest  Ref Range: 8 - 16 mmol/L 12   BUN Latest Ref Range: 8 - 23 mg/dL 11   Creatinine Latest Ref Range: 0.5 - 1.4 mg/dL 0.6   eGFR if non African American Latest Ref Range: >60 mL/min/1.73 m^2 >60.0   eGFR if African American Latest Ref Range: >60 mL/min/1.73 m^2 >60.0   Glucose Latest Ref Range: 70 - 110 mg/dL 104   Calcium Latest Ref Range: 8.7 - 10.5 mg/dL 9.1       Imagin/6/21 CT neck with contrast  FINDINGS:  Skull Base and Brain (limited evaluation): No significant abnormality.     Sinuses and Mastoid Air Cells: Essentially clear.     Salivary Glands: Left parotid gland is slightly asymmetrically enlarged, however this is similar compared to prior exam.     Thyroid: Normal in size and configuration.     Cervical Lymph Nodes: There is an enhancing left submental lymph node measuring 3.2 x 2.4 cm (series 2, image 107).  There is relative hypoattenuation centrally suggestive of necrosis.  No other enlarged lymph nodes are visualized.     Oropharynx: There is an enhancing mass at the base of the left tongue measuring 3.8 x 2.1 cm (series 2, image 79).     Pharynx/Larynx: Normal, with preservation of the normal anatomical fat planes.     Vasculature (limited evaluation): Severe calcific atherosclerosis of the aortic arch and its branch vessels.  Approximately 50% stenosis of the right common carotid artery and ICA.  Approximately 70% stenosis of the left ICA.     Upper Airways and Lungs: Trachea is patent and midline.  Findings consistent with centrilobular and paraseptal emphysema.  Scarring of the apices of the lungs bilaterally.     Bones: No acute fracture or suspicious lytic or sclerotic lesion.     Impression:     Enhancing mass in the base of the left tongue with an adjacent left submental enlarged, enhancing lymph node.  Findings are concerning for malignancy.   Consultation with ENT is recommended.     Findings consistent with centrilobular and paraseptal emphysema.     Approximately 50% stenosis of  the right common carotid artery and right ICA.  Approximately 70% stenosis of the left ICA.     This report was flagged in Epic as abnormal.      12/16/21 CT chest without contrast  FINDINGS:  Chest: Elevated right hemidiaphragm noted.  There is moderate centrilobular and paraseptal emphysematous lung changes with upper lobe predominance. Mild biapical pleuroparenchymal scarring/thickening noted.  Mild dependent interstitial thickening.  Trace amount of left basilar atelectasis of the lingula.  No lung consolidation.  No concerning lung nodules.  The tracheobronchial tree is clear.     Mediastinum: Thyroid gland is grossly unremarkable.  Mildly enlarged pretracheal lymph node measuring 1.3 cm short axis (series 2, image 54).  Extensive scattered calcific atherosclerosis.  Dilatation of the descending thoracic aorta measuring 3.9 cm.  No pericardial or pleural effusion.     Upper abdomen: Right renal cyst noted.     Bones: No marrow replacement process.     Impression:     Mildly enlarged pretracheal lymph node (1.3 cm).     Moderate emphysematous lung changes.     Extensive scattered calcific atherosclerosis.     Dilatation of the descending thoracic aorta (3.9 cm).      Assessment:       1. Squamous cell cancer of tongue    2. Normocytic anemia    3. Protein-calorie malnutrition, unspecified severity     4. Chemotherapy-induced nausea    5. Toxic effect of tobacco cigarette, intentional self-harm, sequela    6. Peripheral vascular disease    7. COPD exacerbation    8. Secondary malignant neoplasm of cervical lymph node           Plan:       Problem List Items Addressed This Visit        Psychiatric    Toxic effect of tobacco cigarette, intentional self-harm    Current Assessment & Plan     Extensive smoking history. Quit 4/2018  -encourage abstinence            Pulmonary    COPD exacerbation    Current Assessment & Plan     No wheezing on exam  -continue with medical management            Cardiac/Vascular     Peripheral vascular disease    Current Assessment & Plan     No new symptoms  -Continue with medical management            Oncology    Squamous cell cancer of tongue - Primary    Overview     12/15/21 FNA left neck mass : squamous cell carcinoma, p16 negative  1/4/22 total glossectomy, bilateral neck dissection, bilateral cervical facial advancement flaps and anterolateral thigh free flap reconstruction of his glossectomy defect.  Final path :  [id number] (+microscopic SALINAS, single contralateral node), superior soft tissue margin of left neck with tumor.           Current Assessment & Plan     Stage IVB squamous cell carcinoma of the tongue with contralateral cervical lymph node involvement, +margins, +SALINAS (microscopic).  S/p peg tube placement.  -Discussed with him RTOG 9501 and role of adjuvant chemoradiation  -consented for cisplatin  -patient has portal. Enrolled into [facility]   -follow up with me prior to cycle 1  -support medications:  zofran and compazine prn nausea.  -will discuss with radiation oncology regarding PET CT to better evaluate pre-tracheal node          Relevant Medications    ondansetron (ZOFRAN) 8 MG tablet    prochlorperazine (COMPAZINE) 10 MG tablet    Other Relevant Orders    Care Companion Enrollment Chemotherapy (Completed)    Assign Chemotherapy Program Consent Questionaire (Completed)    NM PET CT Routine FDG    Normocytic anemia    Current Assessment & Plan     At baseline he reports he weighs about 160 lbs.  Last weight 147 lbs.  Suspect his anemia secondary to nutrition deficit  -continue with tube feeds  -evaluate for reversible causes         Relevant Orders    Ferritin (Completed)    Vitamin B12 (Completed)    Folate (Completed)    TSH (Completed)    Secondary malignant neoplasm of cervical lymph node    Overview     See sq.c.c. tongue         Relevant Orders    NM PET CT Routine FDG       Endocrine    Protein-calorie malnutrition    Current Assessment & Plan     Secondary  to malignancy.  -monitor weight  -continue tube feeds and follow up with nutrition         Relevant Orders    Vitamin B12 (Completed)    Folate (Completed)      Other Visit Diagnoses     Chemotherapy-induced nausea        Relevant Medications    ondansetron (ZOFRAN) 8 MG tablet    prochlorperazine (COMPAZINE) 10 MG tablet          Orders Placed This Encounter   Procedures    NM PET CT Routine FDG    Ferritin    Vitamin B12    Folate    TSH    Care Companion Enrollment Chemotherapy           I, Dr. Jay, personally spent 90 minutes during this encounter.  Time includes face-to-face time and direct counseling and/or coordination of care, and non-face-to-face time including review of results including labs and imaging, documentation, orders, and scheduling.    Christopher Jay MD  Hematology Oncology

## 2022-02-16 NOTE — PLAN OF CARE
START ON PATHWAY REGIMEN - Head and Neck    [id number]        Cisplatin (Platinol)           Additional Orders: Chemotherapy given concurrently with radiation.    **Always confirm dose/schedule in your pharmacy ordering system**    Patient Characteristics:  Oral Cavity, Postoperative without Neoadjuvant Therapy, SALINAS or Positive Margins  Disease Classification: Oral Cavity  AJCC T Category: T4a  AJCC M Category: M0  AJCC N Category: pN3b  AJCC 8 Stage Grouping: IVB  Therapeutic Status: Postoperative without Neoadjuvant Therapy  Intent of Therapy:  Curative Intent, Discussed with Patient

## 2022-02-16 NOTE — ASSESSMENT & PLAN NOTE
Stage IVB squamous cell carcinoma of the tongue with contralateral cervical lymph node involvement, +margins, +SALINAS (microscopic).  S/p peg tube placement.  -Discussed with him RTOG 9501 and role of adjuvant chemoradiation  -consented for cisplatin  -patient has portal. Enrolled into [facility]   -follow up with me prior to cycle 1  -support medications:  zofran and compazine prn nausea.  -will discuss with radiation oncology regarding PET CT to better evaluate pre-tracheal node

## 2022-02-18 ENCOUNTER — HOSPITAL ENCOUNTER (OUTPATIENT)
Dept: RADIATION THERAPY | Facility: HOSPITAL | Age: 72
Discharge: HOME | End: 2022-02-18
Attending: STUDENT IN AN ORGANIZED HEALTH CARE EDUCATION/TRAINING PROGRAM
Payer: MEDICARE

## 2022-02-18 PROCEDURE — 77014 PR  CT GUIDANCE PLACEMENT RAD THERAPY FIELDS: ICD-10-PCS | Mod: 26,,, | Performed by: STUDENT IN AN ORGANIZED HEALTH CARE EDUCATION/TRAINING PROGRAM

## 2022-02-18 PROCEDURE — 77263 THER RADIOLOGY TX PLNG CPLX: CPT | Mod: ,,, | Performed by: STUDENT IN AN ORGANIZED HEALTH CARE EDUCATION/TRAINING PROGRAM

## 2022-02-18 PROCEDURE — 77334 RADIATION TREATMENT AID(S): CPT | Mod: TC | Performed by: STUDENT IN AN ORGANIZED HEALTH CARE EDUCATION/TRAINING PROGRAM

## 2022-02-18 PROCEDURE — 77334 RADIATION TREATMENT AID(S): CPT | Mod: 26,,, | Performed by: STUDENT IN AN ORGANIZED HEALTH CARE EDUCATION/TRAINING PROGRAM

## 2022-02-18 PROCEDURE — 77014 PR  CT GUIDANCE PLACEMENT RAD THERAPY FIELDS: CPT | Mod: 26,,, | Performed by: STUDENT IN AN ORGANIZED HEALTH CARE EDUCATION/TRAINING PROGRAM

## 2022-02-18 PROCEDURE — 77014 HC CT GUIDANCE RADIATION THERAPY FLDS PLACEMENT: CPT | Mod: TC | Performed by: STUDENT IN AN ORGANIZED HEALTH CARE EDUCATION/TRAINING PROGRAM

## 2022-02-18 PROCEDURE — 77263 PR  RADIATION THERAPY PLAN COMPLEX: ICD-10-PCS | Mod: ,,, | Performed by: STUDENT IN AN ORGANIZED HEALTH CARE EDUCATION/TRAINING PROGRAM

## 2022-02-18 PROCEDURE — 77334 PR  RADN TREATMENT AID(S) COMPLX: ICD-10-PCS | Mod: 26,,, | Performed by: STUDENT IN AN ORGANIZED HEALTH CARE EDUCATION/TRAINING PROGRAM

## 2022-02-24 DIAGNOSIS — C02.9 SQUAMOUS CELL CANCER OF TONGUE: Primary | ICD-10-CM

## 2022-02-24 PROCEDURE — 77301 RADIOTHERAPY DOSE PLAN IMRT: CPT | Mod: TC | Performed by: STUDENT IN AN ORGANIZED HEALTH CARE EDUCATION/TRAINING PROGRAM

## 2022-02-24 PROCEDURE — 77301 PR  INTEN MOD RADIOTHER PLAN W/DOSE VOL HIST: ICD-10-PCS | Mod: 26,,, | Performed by: STUDENT IN AN ORGANIZED HEALTH CARE EDUCATION/TRAINING PROGRAM

## 2022-02-24 PROCEDURE — 77301 RADIOTHERAPY DOSE PLAN IMRT: CPT | Mod: 26,,, | Performed by: STUDENT IN AN ORGANIZED HEALTH CARE EDUCATION/TRAINING PROGRAM

## 2022-02-25 ENCOUNTER — LAB VISIT (OUTPATIENT)
Dept: LAB | Facility: HOSPITAL | Age: 72
End: 2022-02-25
Attending: INTERNAL MEDICINE
Payer: MEDICARE

## 2022-02-25 DIAGNOSIS — C02.9 SQUAMOUS CELL CANCER OF TONGUE: ICD-10-CM

## 2022-02-25 LAB
ALBUMIN SERPL BCP-MCNC: 3.2 G/DL (ref 3.5–5.2)
ALP SERPL-CCNC: 90 U/L (ref 55–135)
ALT SERPL W/O P-5'-P-CCNC: 24 U/L (ref 10–44)
ANION GAP SERPL CALC-SCNC: 6 MMOL/L (ref 8–16)
AST SERPL-CCNC: 20 U/L (ref 10–40)
BILIRUB SERPL-MCNC: 0.4 MG/DL (ref 0.1–1)
BUN SERPL-MCNC: 24 MG/DL (ref 8–23)
CALCIUM SERPL-MCNC: 9.3 MG/DL (ref 8.7–10.5)
CHLORIDE SERPL-SCNC: 97 MMOL/L (ref 95–110)
CO2 SERPL-SCNC: 32 MMOL/L (ref 23–29)
CREAT SERPL-MCNC: 0.7 MG/DL (ref 0.5–1.4)
ERYTHROCYTE [DISTWIDTH] IN BLOOD BY AUTOMATED COUNT: 14.6 % (ref 11.5–14.5)
EST. GFR  (AFRICAN AMERICAN): >60 ML/MIN/1.73 M^2
EST. GFR  (NON AFRICAN AMERICAN): >60 ML/MIN/1.73 M^2
GLUCOSE SERPL-MCNC: 162 MG/DL (ref 70–110)
HCT VFR BLD AUTO: 33.3 % (ref 40–54)
HGB BLD-MCNC: 9.8 G/DL (ref 14–18)
IMM GRANULOCYTES # BLD AUTO: 0.03 K/UL (ref 0–0.04)
MAGNESIUM SERPL-MCNC: 1.9 MG/DL (ref 1.6–2.6)
MCH RBC QN AUTO: 28.1 PG (ref 27–31)
MCHC RBC AUTO-ENTMCNC: 29.4 G/DL (ref 32–36)
MCV RBC AUTO: 95 FL (ref 82–98)
NEUTROPHILS # BLD AUTO: 4 K/UL (ref 1.8–7.7)
PLATELET # BLD AUTO: 237 K/UL (ref 150–450)
PMV BLD AUTO: 10 FL (ref 9.2–12.9)
POTASSIUM SERPL-SCNC: 4.8 MMOL/L (ref 3.5–5.1)
PROT SERPL-MCNC: 6.2 G/DL (ref 6–8.4)
RBC # BLD AUTO: 3.49 M/UL (ref 4.6–6.2)
SODIUM SERPL-SCNC: 135 MMOL/L (ref 136–145)
WBC # BLD AUTO: 5.44 K/UL (ref 3.9–12.7)

## 2022-02-25 PROCEDURE — 77338 PR  MLC IMRT DESIGN & CONSTRUCTION PER IMRT PLAN: ICD-10-PCS | Mod: 26,,, | Performed by: STUDENT IN AN ORGANIZED HEALTH CARE EDUCATION/TRAINING PROGRAM

## 2022-02-25 PROCEDURE — 77300 RADIATION THERAPY DOSE PLAN: CPT | Mod: 26,,, | Performed by: STUDENT IN AN ORGANIZED HEALTH CARE EDUCATION/TRAINING PROGRAM

## 2022-02-25 PROCEDURE — 83735 ASSAY OF MAGNESIUM: CPT | Performed by: INTERNAL MEDICINE

## 2022-02-25 PROCEDURE — 77300 RADIATION THERAPY DOSE PLAN: CPT | Mod: TC | Performed by: STUDENT IN AN ORGANIZED HEALTH CARE EDUCATION/TRAINING PROGRAM

## 2022-02-25 PROCEDURE — 80053 COMPREHEN METABOLIC PANEL: CPT | Performed by: INTERNAL MEDICINE

## 2022-02-25 PROCEDURE — 36415 COLL VENOUS BLD VENIPUNCTURE: CPT | Performed by: INTERNAL MEDICINE

## 2022-02-25 PROCEDURE — 77300 PR RADIATION THERAPY,DOSIMETRY PLAN: ICD-10-PCS | Mod: 26,,, | Performed by: STUDENT IN AN ORGANIZED HEALTH CARE EDUCATION/TRAINING PROGRAM

## 2022-02-25 PROCEDURE — 85027 COMPLETE CBC AUTOMATED: CPT | Performed by: INTERNAL MEDICINE

## 2022-02-25 PROCEDURE — 77338 DESIGN MLC DEVICE FOR IMRT: CPT | Mod: 26,,, | Performed by: STUDENT IN AN ORGANIZED HEALTH CARE EDUCATION/TRAINING PROGRAM

## 2022-02-25 PROCEDURE — 77338 DESIGN MLC DEVICE FOR IMRT: CPT | Mod: TC | Performed by: STUDENT IN AN ORGANIZED HEALTH CARE EDUCATION/TRAINING PROGRAM

## 2022-02-28 ENCOUNTER — INFUSION (OUTPATIENT)
Dept: INFUSION THERAPY | Facility: HOSPITAL | Age: 72
End: 2022-02-28
Payer: MEDICARE

## 2022-02-28 ENCOUNTER — OFFICE VISIT (OUTPATIENT)
Dept: OTOLARYNGOLOGY | Facility: CLINIC | Age: 72
End: 2022-02-28
Payer: MEDICARE

## 2022-02-28 ENCOUNTER — DOCUMENTATION ONLY (OUTPATIENT)
Dept: RADIATION ONCOLOGY | Facility: CLINIC | Age: 72
End: 2022-02-28
Payer: MEDICARE

## 2022-02-28 ENCOUNTER — OFFICE VISIT (OUTPATIENT)
Dept: HEMATOLOGY/ONCOLOGY | Facility: CLINIC | Age: 72
End: 2022-02-28
Payer: MEDICARE

## 2022-02-28 VITALS
HEIGHT: 68 IN | RESPIRATION RATE: 20 BRPM | SYSTOLIC BLOOD PRESSURE: 120 MMHG | TEMPERATURE: 98 F | SYSTOLIC BLOOD PRESSURE: 108 MMHG | HEART RATE: 74 BPM | OXYGEN SATURATION: 95 % | DIASTOLIC BLOOD PRESSURE: 62 MMHG | WEIGHT: 136 LBS | BODY MASS INDEX: 20.61 KG/M2 | DIASTOLIC BLOOD PRESSURE: 66 MMHG | HEART RATE: 2 BPM

## 2022-02-28 VITALS
RESPIRATION RATE: 22 BRPM | DIASTOLIC BLOOD PRESSURE: 56 MMHG | WEIGHT: 125 LBS | HEART RATE: 69 BPM | BODY MASS INDEX: 18.94 KG/M2 | HEIGHT: 68 IN | OXYGEN SATURATION: 94 % | SYSTOLIC BLOOD PRESSURE: 101 MMHG

## 2022-02-28 DIAGNOSIS — C02.9 SQUAMOUS CELL CANCER OF TONGUE: Primary | ICD-10-CM

## 2022-02-28 DIAGNOSIS — C77.0 SECONDARY MALIGNANT NEOPLASM OF CERVICAL LYMPH NODE: Primary | ICD-10-CM

## 2022-02-28 DIAGNOSIS — D64.9 NORMOCYTIC ANEMIA: ICD-10-CM

## 2022-02-28 DIAGNOSIS — C02.9 SQUAMOUS CELL CANCER OF TONGUE: ICD-10-CM

## 2022-02-28 DIAGNOSIS — D84.9 IMMUNOSUPPRESSED STATUS: ICD-10-CM

## 2022-02-28 DIAGNOSIS — E44.0 MODERATE PROTEIN-CALORIE MALNUTRITION: ICD-10-CM

## 2022-02-28 DIAGNOSIS — C77.0 SECONDARY MALIGNANT NEOPLASM OF CERVICAL LYMPH NODE: ICD-10-CM

## 2022-02-28 PROCEDURE — 63600175 PHARM REV CODE 636 W HCPCS: Performed by: STUDENT IN AN ORGANIZED HEALTH CARE EDUCATION/TRAINING PROGRAM

## 2022-02-28 PROCEDURE — 3074F PR MOST RECENT SYSTOLIC BLOOD PRESSURE < 130 MM HG: ICD-10-PCS | Mod: CPTII,S$GLB,, | Performed by: OTOLARYNGOLOGY

## 2022-02-28 PROCEDURE — 96413 CHEMO IV INFUSION 1 HR: CPT

## 2022-02-28 PROCEDURE — 1160F RVW MEDS BY RX/DR IN RCRD: CPT | Mod: CPTII,S$GLB,, | Performed by: OTOLARYNGOLOGY

## 2022-02-28 PROCEDURE — 1159F MED LIST DOCD IN RCRD: CPT | Mod: CPTII,S$GLB,, | Performed by: OTOLARYNGOLOGY

## 2022-02-28 PROCEDURE — 3074F SYST BP LT 130 MM HG: CPT | Mod: CPTII,S$GLB,, | Performed by: STUDENT IN AN ORGANIZED HEALTH CARE EDUCATION/TRAINING PROGRAM

## 2022-02-28 PROCEDURE — 77014 PR  CT GUIDANCE PLACEMENT RAD THERAPY FIELDS: ICD-10-PCS | Mod: 26,,, | Performed by: STUDENT IN AN ORGANIZED HEALTH CARE EDUCATION/TRAINING PROGRAM

## 2022-02-28 PROCEDURE — 1101F PT FALLS ASSESS-DOCD LE1/YR: CPT | Mod: CPTII,S$GLB,, | Performed by: STUDENT IN AN ORGANIZED HEALTH CARE EDUCATION/TRAINING PROGRAM

## 2022-02-28 PROCEDURE — 99999 PR PBB SHADOW E&M-EST. PATIENT-LVL III: ICD-10-PCS | Mod: PBBFAC,,, | Performed by: OTOLARYNGOLOGY

## 2022-02-28 PROCEDURE — 99215 PR OFFICE/OUTPT VISIT, EST, LEVL V, 40-54 MIN: ICD-10-PCS | Mod: S$GLB,,, | Performed by: STUDENT IN AN ORGANIZED HEALTH CARE EDUCATION/TRAINING PROGRAM

## 2022-02-28 PROCEDURE — 25000003 PHARM REV CODE 250: Performed by: STUDENT IN AN ORGANIZED HEALTH CARE EDUCATION/TRAINING PROGRAM

## 2022-02-28 PROCEDURE — 77014 HC CT GUIDANCE RADIATION THERAPY FLDS PLACEMENT: CPT | Mod: TC | Performed by: STUDENT IN AN ORGANIZED HEALTH CARE EDUCATION/TRAINING PROGRAM

## 2022-02-28 PROCEDURE — 1126F PR PAIN SEVERITY QUANTIFIED, NO PAIN PRESENT: ICD-10-PCS | Mod: CPTII,S$GLB,, | Performed by: STUDENT IN AN ORGANIZED HEALTH CARE EDUCATION/TRAINING PROGRAM

## 2022-02-28 PROCEDURE — 96366 THER/PROPH/DIAG IV INF ADDON: CPT

## 2022-02-28 PROCEDURE — 3078F DIAST BP <80 MM HG: CPT | Mod: CPTII,S$GLB,, | Performed by: STUDENT IN AN ORGANIZED HEALTH CARE EDUCATION/TRAINING PROGRAM

## 2022-02-28 PROCEDURE — 3078F DIAST BP <80 MM HG: CPT | Mod: CPTII,S$GLB,, | Performed by: OTOLARYNGOLOGY

## 2022-02-28 PROCEDURE — 4010F ACE/ARB THERAPY RXD/TAKEN: CPT | Mod: CPTII,S$GLB,, | Performed by: STUDENT IN AN ORGANIZED HEALTH CARE EDUCATION/TRAINING PROGRAM

## 2022-02-28 PROCEDURE — 1126F AMNT PAIN NOTED NONE PRSNT: CPT | Mod: CPTII,S$GLB,, | Performed by: STUDENT IN AN ORGANIZED HEALTH CARE EDUCATION/TRAINING PROGRAM

## 2022-02-28 PROCEDURE — 3008F PR BODY MASS INDEX (BMI) DOCUMENTED: ICD-10-PCS | Mod: CPTII,S$GLB,, | Performed by: STUDENT IN AN ORGANIZED HEALTH CARE EDUCATION/TRAINING PROGRAM

## 2022-02-28 PROCEDURE — 1126F PR PAIN SEVERITY QUANTIFIED, NO PAIN PRESENT: ICD-10-PCS | Mod: CPTII,S$GLB,, | Performed by: OTOLARYNGOLOGY

## 2022-02-28 PROCEDURE — 3008F BODY MASS INDEX DOCD: CPT | Mod: CPTII,S$GLB,, | Performed by: STUDENT IN AN ORGANIZED HEALTH CARE EDUCATION/TRAINING PROGRAM

## 2022-02-28 PROCEDURE — 99024 PR POST-OP FOLLOW-UP VISIT: ICD-10-PCS | Mod: S$GLB,,, | Performed by: OTOLARYNGOLOGY

## 2022-02-28 PROCEDURE — 96361 HYDRATE IV INFUSION ADD-ON: CPT

## 2022-02-28 PROCEDURE — 77014 PR  CT GUIDANCE PLACEMENT RAD THERAPY FIELDS: CPT | Mod: 26,,, | Performed by: STUDENT IN AN ORGANIZED HEALTH CARE EDUCATION/TRAINING PROGRAM

## 2022-02-28 PROCEDURE — 3288F PR FALLS RISK ASSESSMENT DOCUMENTED: ICD-10-PCS | Mod: CPTII,S$GLB,, | Performed by: STUDENT IN AN ORGANIZED HEALTH CARE EDUCATION/TRAINING PROGRAM

## 2022-02-28 PROCEDURE — 96367 TX/PROPH/DG ADDL SEQ IV INF: CPT

## 2022-02-28 PROCEDURE — 99024 POSTOP FOLLOW-UP VISIT: CPT | Mod: S$GLB,,, | Performed by: OTOLARYNGOLOGY

## 2022-02-28 PROCEDURE — 1101F PR PT FALLS ASSESS DOC 0-1 FALLS W/OUT INJ PAST YR: ICD-10-PCS | Mod: CPTII,S$GLB,, | Performed by: STUDENT IN AN ORGANIZED HEALTH CARE EDUCATION/TRAINING PROGRAM

## 2022-02-28 PROCEDURE — 1159F PR MEDICATION LIST DOCUMENTED IN MEDICAL RECORD: ICD-10-PCS | Mod: CPTII,S$GLB,, | Performed by: OTOLARYNGOLOGY

## 2022-02-28 PROCEDURE — 3288F FALL RISK ASSESSMENT DOCD: CPT | Mod: CPTII,S$GLB,, | Performed by: STUDENT IN AN ORGANIZED HEALTH CARE EDUCATION/TRAINING PROGRAM

## 2022-02-28 PROCEDURE — 3074F PR MOST RECENT SYSTOLIC BLOOD PRESSURE < 130 MM HG: ICD-10-PCS | Mod: CPTII,S$GLB,, | Performed by: STUDENT IN AN ORGANIZED HEALTH CARE EDUCATION/TRAINING PROGRAM

## 2022-02-28 PROCEDURE — 96375 TX/PRO/DX INJ NEW DRUG ADDON: CPT

## 2022-02-28 PROCEDURE — 77386 HC IMRT, COMPLEX: CPT | Performed by: STUDENT IN AN ORGANIZED HEALTH CARE EDUCATION/TRAINING PROGRAM

## 2022-02-28 PROCEDURE — 3078F PR MOST RECENT DIASTOLIC BLOOD PRESSURE < 80 MM HG: ICD-10-PCS | Mod: CPTII,S$GLB,, | Performed by: STUDENT IN AN ORGANIZED HEALTH CARE EDUCATION/TRAINING PROGRAM

## 2022-02-28 PROCEDURE — 96360 HYDRATION IV INFUSION INIT: CPT

## 2022-02-28 PROCEDURE — 99999 PR PBB SHADOW E&M-EST. PATIENT-LVL III: ICD-10-PCS | Mod: PBBFAC,,, | Performed by: STUDENT IN AN ORGANIZED HEALTH CARE EDUCATION/TRAINING PROGRAM

## 2022-02-28 PROCEDURE — 3074F SYST BP LT 130 MM HG: CPT | Mod: CPTII,S$GLB,, | Performed by: OTOLARYNGOLOGY

## 2022-02-28 PROCEDURE — 4010F PR ACE/ARB THEARPY RXD/TAKEN: ICD-10-PCS | Mod: CPTII,S$GLB,, | Performed by: STUDENT IN AN ORGANIZED HEALTH CARE EDUCATION/TRAINING PROGRAM

## 2022-02-28 PROCEDURE — 3078F PR MOST RECENT DIASTOLIC BLOOD PRESSURE < 80 MM HG: ICD-10-PCS | Mod: CPTII,S$GLB,, | Performed by: OTOLARYNGOLOGY

## 2022-02-28 PROCEDURE — 99999 PR PBB SHADOW E&M-EST. PATIENT-LVL III: CPT | Mod: PBBFAC,,, | Performed by: OTOLARYNGOLOGY

## 2022-02-28 PROCEDURE — 4010F ACE/ARB THERAPY RXD/TAKEN: CPT | Mod: CPTII,S$GLB,, | Performed by: OTOLARYNGOLOGY

## 2022-02-28 PROCEDURE — 99999 PR PBB SHADOW E&M-EST. PATIENT-LVL III: CPT | Mod: PBBFAC,,, | Performed by: STUDENT IN AN ORGANIZED HEALTH CARE EDUCATION/TRAINING PROGRAM

## 2022-02-28 PROCEDURE — 4010F PR ACE/ARB THEARPY RXD/TAKEN: ICD-10-PCS | Mod: CPTII,S$GLB,, | Performed by: OTOLARYNGOLOGY

## 2022-02-28 PROCEDURE — 1160F PR REVIEW ALL MEDS BY PRESCRIBER/CLIN PHARMACIST DOCUMENTED: ICD-10-PCS | Mod: CPTII,S$GLB,, | Performed by: OTOLARYNGOLOGY

## 2022-02-28 PROCEDURE — 1126F AMNT PAIN NOTED NONE PRSNT: CPT | Mod: CPTII,S$GLB,, | Performed by: OTOLARYNGOLOGY

## 2022-02-28 PROCEDURE — 99215 OFFICE O/P EST HI 40 MIN: CPT | Mod: S$GLB,,, | Performed by: STUDENT IN AN ORGANIZED HEALTH CARE EDUCATION/TRAINING PROGRAM

## 2022-02-28 RX ORDER — SODIUM CHLORIDE AND POTASSIUM CHLORIDE 150; 900 MG/100ML; MG/100ML
INJECTION, SOLUTION INTRAVENOUS
Status: COMPLETED | OUTPATIENT
Start: 2022-02-28 | End: 2022-02-28

## 2022-02-28 RX ORDER — SODIUM CHLORIDE 0.9 % (FLUSH) 0.9 %
10 SYRINGE (ML) INJECTION
Status: CANCELLED | OUTPATIENT
Start: 2022-02-28

## 2022-02-28 RX ORDER — HEPARIN 100 UNIT/ML
500 SYRINGE INTRAVENOUS
Status: CANCELLED | OUTPATIENT
Start: 2022-02-28

## 2022-02-28 RX ORDER — SODIUM CHLORIDE 0.9 % (FLUSH) 0.9 %
10 SYRINGE (ML) INJECTION
Status: DISCONTINUED | OUTPATIENT
Start: 2022-02-28 | End: 2022-02-28 | Stop reason: HOSPADM

## 2022-02-28 RX ORDER — MAGNESIUM SULFATE 1 G/100ML
1 INJECTION INTRAVENOUS
Status: COMPLETED | OUTPATIENT
Start: 2022-02-28 | End: 2022-02-28

## 2022-02-28 RX ORDER — HEPARIN 100 UNIT/ML
500 SYRINGE INTRAVENOUS
Status: DISCONTINUED | OUTPATIENT
Start: 2022-02-28 | End: 2022-02-28 | Stop reason: HOSPADM

## 2022-02-28 RX ADMIN — CISPLATIN 69 MG: 1 INJECTION INTRAVENOUS at 11:02

## 2022-02-28 RX ADMIN — SODIUM CHLORIDE AND POTASSIUM CHLORIDE: 9; 1.49 INJECTION, SOLUTION INTRAVENOUS at 08:02

## 2022-02-28 RX ADMIN — SODIUM CHLORIDE 1000 ML: 0.9 INJECTION, SOLUTION INTRAVENOUS at 12:02

## 2022-02-28 RX ADMIN — MAGNESIUM SULFATE HEPTAHYDRATE 1 G: 1 INJECTION, SOLUTION INTRAVENOUS at 08:02

## 2022-02-28 RX ADMIN — APREPITANT 130 MG: 130 INJECTION, EMULSION INTRAVENOUS at 10:02

## 2022-02-28 RX ADMIN — PALONOSETRON HYDROCHLORIDE 0.25 MG: 0.25 INJECTION, SOLUTION INTRAVENOUS at 10:02

## 2022-02-28 NOTE — ASSESSMENT & PLAN NOTE
Patient is tube feed dependent. Reports lost 5 lbs since his last visit.  -continue monitoring weight  -continue tube feeds

## 2022-02-28 NOTE — ASSESSMENT & PLAN NOTE
Stage IVB squamous cell carcinoma of the tongue with contralateral cervical lymph node involvement, +margins, +SALINAS (microscopic).  S/p peg tube placement.  -labs reviewed; ok to proceed with cisplatin.  -follow up with me prior to cycle 3  -support medications:  zofran and compazine prn nausea.

## 2022-02-28 NOTE — PLAN OF CARE
Pt admitted for C1 Cisplatin/IVF. Labs reviewed and side effects and self care tips discussed. Education continued throughout treatment .Pt tolerated treatment well. Plan of care reviewed and Pt to RTC Friday for weekly labs. Discharged with wife in W/C at 13:20

## 2022-02-28 NOTE — ASSESSMENT & PLAN NOTE
Improved since his prior visit. No obvious nutrient deficiencies.  -monitor cbc during treatment  -continue tube feeds

## 2022-02-28 NOTE — PROGRESS NOTES
PATIENT: Fareed Richard Jr.  MRN: [id number]  DATE: 2/28/2022      Diagnosis:   1. Squamous cell cancer of tongue    2. Secondary malignant neoplasm of cervical lymph node    3. Normocytic anemia    4. Moderate protein-calorie malnutrition        Chief Complaint: Squamous cell cancer of tongue      Oncologic History:    Oncologic History 1. Squamous cell carcinoma of tongue    Oncologic Treatment 1.  1/4/22 total glossectomy, bilateral neck dissection, bilateral cervical facial advancement flaps and anterolateral thigh free flap reconstruction of his glossectomy defect   2. 2/28/22 adjuvant chemoradiation with cisplatin    Pathology Squamous cell carcinoma, poorly differentiated, p16 negative  Final pathology: pT4a:  Moderately advanced local disease.  Tumor size = 6.0 cm with DOI = 29 mm and invades adjacent structures.   pN3b:  Metastasis in single contralateral node, SALINAS(+).   pM:  Not applicable.   Superior soft tissue margin of the left neck is involved by tumor           Subjective:    Interval History: Mr. Richard is here for follow up    Initially saw Aletha Cook NP (ENT) 11/29/21 regarding non-healing left sided tongue lesion for about 6 weeks.  Associated symptoms included tongue pain, bleeding, left otalgia, weight loss from difficulty eating, and bump under left chin that enlarged and was refractory to oral antibiotics and nystatin.   History significant for skin cancer of the left cheek and right lower lip about 5-6 years ago. He reported he had a flap done with Dr. Starks. His wife notes that one of the cancers was SCC and the other was basal cell but she cannot call which was which. 12/6/21 CT neck was done (findings below). 12/15/21 he saw Dr. Smalls who performed an FNA of his left neck mass, which finalized as squamous cell carcinoma, p16 negative.   12/16/21 CT chest without contrast was done (findings below).  1/4/22 underwent total glossectomy, bilateral neck dissection, bilateral cervical facial  advancement flaps and anterolateral thigh free flap reconstruction of his glossectomy defect.  Final pathology [id number], +SALINAS (microscopic), +margin (superior soft tissue margin of left neck).    He is here today to start cycle 1 of chemoradiation.  He is compliant with tube feeds but reports subjective weight loss of about 5 lbs.  No new changes to surgical site. Denies any significant neck swelling.  Has excess saliva.  He has a peg tube.  He denies any issues getting peripheral IVs or labs.  Tube feed dependent; all nutrition is via peg tube.    Former smoker, 110 pack years.    Past Medical History:   Past Medical History:   Diagnosis Date    Cancer     skin to Rt forearm and face    COPD (chronic obstructive pulmonary disease)     Hyperlipidemia     Hypertension     Pseudoaneurysm        Past Surgical HIstory:   Past Surgical History:   Procedure Laterality Date    ABDOMINAL SURGERY      stents placed in liver and large intestines, per patient    CAROTID STENT      CORONARY STENT PLACEMENT  01/2000    DISSECTION OF NECK Bilateral 1/4/2022    Procedure: DISSECTION, NECK;  Surgeon: Naeem Smalls MD;  Location: [address];  Service: ENT;  Laterality: Bilateral;    ESOPHAGOGASTRODUODENOSCOPY W/ PEG N/A 1/4/2022    Procedure: EGD, WITH PEG TUBE INSERTION;  Surgeon: Anthony Calabrese MD;  Location: [address];  Service: General;  Laterality: N/A;    EYE SURGERY      Cataract bilateral    femoral stents      bilateral    FLAP PROCEDURE N/A 1/3/2022    Procedure: CREATION, FREE FLAP;  Surgeon: Naeem Smalls MD;  Location: [address];  Service: ENT;  Laterality: N/A;    FLAP PROCEDURE Right 1/4/2022    Procedure: CREATION, FREE FLAP;  Surgeon: Stacey Conde MD;  Location: [address];  Service: ENT;  Laterality: Right;  Ischemic start 1203  Ischemic stop 1353    GLOSSECTOMY N/A 1/4/2022    Procedure: GLOSSECTOMY;  Surgeon: Naeem Smalls MD;  Location: [address]  [facility];  Service: ENT;  Laterality: N/A;    RADICAL NECK DISSECTION Left 1/3/2022    Procedure: DISSECTION, NECK, RADICAL;  Surgeon: Naeem Smalls MD;  [facility];  Service: ENT;  Laterality: Left;    SKIN CANCER EXCISION      TRACHEOTOMY N/A 1/4/2022    Procedure: TRACHEOTOMY;  Surgeon: Naeem Smalls MD;  [facility];  Service: ENT;  Laterality: N/A;       Family History: No family history on file.    Social History:  reports that he quit smoking about 3 years ago. His smoking use included cigarettes. He started smoking about 58 years ago. He has a 80.00 pack-year smoking history. He has never used smokeless tobacco. He reports current alcohol use of about 3.0 standard drinks of alcohol per week. He reports that he does not use drugs.    Allergies:  Review of patient's allergies indicates:  No Known Allergies    Medications:  Current Outpatient Medications   Medication Sig Dispense Refill    acetaminophen (TYLENOL) 325 MG tablet 2 tablets (650 mg total) by Per G Tube route every 6 (six) hours.  0    albuterol-ipratropium (DUO-NEB) 2.5 mg-0.5 mg/3 mL nebulizer solution Take 3 mLs by nebulization every 4 (four) hours as needed for Wheezing. Rescue 75 mL 0    amLODIPine (NORVASC) 10 MG tablet Take 10 mg by mouth once daily.      aspirin 81 MG Chew 1 tablet (81 mg total) by Per G Tube route once daily.  0    atorvastatin (LIPITOR) 20 MG tablet 1 tablet (20 mg total) by Per G Tube route once daily. 90 tablet 3    bisacodyL (DULCOLAX) 10 mg Supp Place 1 suppository (10 mg total) rectally daily as needed.  0    clopidogreL (PLAVIX) 75 mg tablet 1 tablet (75 mg total) by Per G Tube route once daily. (Patient not taking: No sig reported) 30 tablet 11    enoxaparin (LOVENOX) 40 mg/0.4 mL Syrg Inject 0.4 mLs (40 mg total) into the skin once daily. (Patient not taking: No sig reported)      folic acid (FOLVITE) 1 MG tablet 1 tablet (1 mg total) by Per G Tube route once daily.  (Patient not taking: No sig reported)  0    losartan (COZAAR) 50 MG tablet 1 tablet (50 mg total) by Per G Tube route once daily. 90 tablet 3    melatonin (MELATIN) 3 mg tablet 2 tablets (6 mg total) by Per G Tube route nightly. (Patient not taking: No sig reported)  0    metoprolol tartrate (LOPRESSOR) 100 MG tablet 1 tablet (100 mg total) by Per G Tube route 2 (two) times daily. 60 tablet 11    ondansetron (ZOFRAN) 8 MG tablet Take 1 tablet (8 mg total) by mouth every 8 (eight) hours as needed for Nausea. 30 tablet 3    oxyCODONE (ROXICODONE) 5 mg/5 mL Soln 5 mLs (5 mg total) by Per G Tube route every 4 (four) hours as needed. (Patient not taking: No sig reported)  0    polyethylene glycol (GLYCOLAX) 17 gram PwPk 17 g by Per G Tube route 2 (two) times daily. (Patient not taking: No sig reported)  0    prochlorperazine (COMPAZINE) 10 MG tablet Take 1 tablet (10 mg total) by mouth 3 (three) times daily as needed (use when zofran does not work). 30 tablet 3    senna-docusate 8.6-50 mg (PERICOLACE) 8.6-50 mg per tablet 1 tablet by Per G Tube route 2 (two) times daily. (Patient not taking: No sig reported)      sodium chloride (OCEAN) 0.65 % nasal spray 1 spray by Nasal route as needed for Congestion. (Patient not taking: No sig reported)  12    sodium chloride 3% 3 % nebulizer solution Take 4 mLs by nebulization every 8 (eight) hours.      thiamine 100 MG tablet 1 tablet (100 mg total) by Per G Tube route once daily. (Patient not taking: No sig reported)       No current facility-administered medications for this visit.     Facility-Administered Medications Ordered in Other Visits   Medication Dose Route Frequency Provider Last Rate Last Admin    0.9 % NaCl with KCl 20 mEq infusion   Intravenous 1 time in Clinic/HOD Christopher Jay MD        And    magnesium sulfate in dextrose IVPB (premix) 1 g  1 g Intravenous 1 time in Clinic/HOD Christopher Jay MD        alteplase injection 2 mg  2 mg Intra-Catheter PRN Christopher  SUBHASH Jay MD        aprepitant (CINVANTI) injection 130 mg  130 mg Intravenous 1 time in Clinic/HOD Christopher Jay MD        CISplatin (PLATINOL) 40 mg/m2 = 69 mg in sodium chloride 0.9% 500 mL chemo infusion  40 mg/m2 Intravenous 1 time in Clinic/HOD Christopher Jay MD        heparin, porcine (PF) 100 unit/mL injection flush 500 Units  500 Units Intravenous PRN Christopher Jay MD        palonosetron 0.25mg/dexamethasone 12mg in NS IVPB 0.25 mg  0.25 mg Intravenous 1 time in Clinic/HOD Christopher Jay MD        sodium chloride 0.9% bolus 1,000 mL  1,000 mL Intravenous 1 time in Clinic/HOD Christopher Jay MD        sodium chloride 0.9% flush 10 mL  10 mL Intravenous PRN Christopher Jay MD           Review of Systems   Constitutional: Positive for unexpected weight change. Negative for activity change, appetite change, chills, diaphoresis, fatigue and fever.   HENT: Positive for trouble swallowing and voice change. Negative for ear pain and nosebleeds.    Eyes: Negative for visual disturbance.   Respiratory: Negative for cough, chest tightness, shortness of breath and wheezing.    Cardiovascular: Negative for chest pain and leg swelling.   Gastrointestinal: Negative for abdominal distention, abdominal pain, blood in stool, constipation, diarrhea, nausea and vomiting.   Endocrine: Negative for cold intolerance and heat intolerance.   Genitourinary: Negative for difficulty urinating and dysuria.   Musculoskeletal: Negative for arthralgias and back pain.   Skin: Negative for color change.   Neurological: Positive for weakness. Negative for dizziness, light-headedness, numbness and headaches.   Hematological: Negative for adenopathy. Does not bruise/bleed easily.   Psychiatric/Behavioral: Negative for confusion.       ECOG Performance Status:     ECOG SCORE    2 - Capable of all selfcare but unable to carry out any work activities, active > 50% of hours         Objective:      Vitals:   Vitals:    02/28/22 0748 02/28/22 0808   BP: (!) 101/56  "   BP Location: Right arm    Patient Position: Sitting    BP Method: Medium (Automatic)    Pulse: 69    Resp: (!) 22    SpO2: (!) 94%    Weight:  56.7 kg (125 lb)   Height: 5' 8" (1.727 m)      BMI: Body mass index is 19.01 kg/m².    Physical Exam  Constitutional:       General: He is not in acute distress.     Appearance: Normal appearance. He is ill-appearing.   HENT:      Head: Normocephalic and atraumatic.      Mouth/Throat:      Pharynx: No oropharyngeal exudate or posterior oropharyngeal erythema.   Eyes:      General: No scleral icterus.     Extraocular Movements: Extraocular movements intact.      Conjunctiva/sclera: Conjunctivae normal.      Pupils: Pupils are equal, round, and reactive to light.   Neck:      Comments: +trach  Cardiovascular:      Rate and Rhythm: Normal rate and regular rhythm.      Heart sounds: No murmur heard.    No friction rub. No gallop.   Pulmonary:      Effort: Pulmonary effort is normal. No respiratory distress.      Breath sounds: No stridor. No wheezing, rhonchi or rales.   Abdominal:      General: Bowel sounds are normal. There is no distension.      Palpations: Abdomen is soft. There is no mass.      Tenderness: There is no abdominal tenderness. There is no guarding or rebound.      Comments: +peg tube site c/d/i   Musculoskeletal:         General: Normal range of motion.      Cervical back: Normal range of motion and neck supple.      Right lower leg: No edema.      Left lower leg: No edema.   Skin:     General: Skin is warm and dry.   Neurological:      General: No focal deficit present.      Mental Status: He is alert.      Motor: Weakness present.         Laboratory Data:   Recent Results (from the past 168 hour(s))   CBC Oncology    Collection Time: 02/25/22  9:56 AM   Result Value Ref Range    WBC 5.44 3.90 - 12.70 K/uL    RBC 3.49 (L) 4.60 - 6.20 M/uL    Hemoglobin 9.8 (L) 14.0 - 18.0 g/dL    Hematocrit 33.3 (L) 40.0 - 54.0 %    MCV 95 82 - 98 fL    MCH 28.1 27.0 - 31.0 " pg    MCHC 29.4 (L) 32.0 - 36.0 g/dL    RDW 14.6 (H) 11.5 - 14.5 %    Platelets 237 150 - 450 K/uL    MPV 10.0 9.2 - 12.9 fL    Gran # (ANC) 4.0 1.8 - 7.7 K/uL    Immature Grans (Abs) 0.03 0.00 - 0.04 K/uL   CMP    Collection Time: 02/25/22  9:56 AM   Result Value Ref Range    Sodium 135 (L) 136 - 145 mmol/L    Potassium 4.8 3.5 - 5.1 mmol/L    Chloride 97 95 - 110 mmol/L    CO2 32 (H) 23 - 29 mmol/L    Glucose 162 (H) 70 - 110 mg/dL    BUN 24 (H) 8 - 23 mg/dL    Creatinine 0.7 0.5 - 1.4 mg/dL    Calcium 9.3 8.7 - 10.5 mg/dL    Total Protein 6.2 6.0 - 8.4 g/dL    Albumin 3.2 (L) 3.5 - 5.2 g/dL    Total Bilirubin 0.4 0.1 - 1.0 mg/dL    Alkaline Phosphatase 90 55 - 135 U/L    AST 20 10 - 40 U/L    ALT 24 10 - 44 U/L    Anion Gap 6 (L) 8 - 16 mmol/L    eGFR if African American >60.0 >60 mL/min/1.73 m^2    eGFR if non African American >60.0 >60 mL/min/1.73 m^2   Magnesium    Collection Time: 02/25/22  9:56 AM   Result Value Ref Range    Magnesium 1.9 1.6 - 2.6 mg/dL       Imaging:         Assessment:       1. Squamous cell cancer of tongue    2. Secondary malignant neoplasm of cervical lymph node    3. Normocytic anemia    4. Moderate protein-calorie malnutrition           Plan:       Problem List Items Addressed This Visit        Oncology    Squamous cell cancer of tongue - Primary    Overview     12/15/21 FNA left neck mass : squamous cell carcinoma, p16 negative  1/4/22 total glossectomy, bilateral neck dissection, bilateral cervical facial advancement flaps and anterolateral thigh free flap reconstruction of his glossectomy defect.  Final path :  [id number] (+microscopic SALINAS, single contralateral node), superior soft tissue margin of left neck with tumor.    2/28/22 start chemoradiation           Current Assessment & Plan     Stage IVB squamous cell carcinoma of the tongue with contralateral cervical lymph node involvement, +margins, +SALINAS (microscopic).  S/p peg tube placement.  -labs reviewed; ok to proceed with  cisplatin.  -follow up with me prior to cycle 3  -support medications:  zofran and compazine prn nausea.           Normocytic anemia    Current Assessment & Plan     Improved since his prior visit. No obvious nutrient deficiencies.  -monitor cbc during treatment  -continue tube feeds           Secondary malignant neoplasm of cervical lymph node    Overview     See sq.c.c. tongue              Endocrine    Protein-calorie malnutrition    Current Assessment & Plan     Patient is tube feed dependent. Reports lost 5 lbs since his last visit.  -continue monitoring weight  -continue tube feeds                 No orders of the defined types were placed in this encounter.    Route Chart for Scheduling    Med Onc Chart Routing      Follow up with physician 2 weeks. 3/14   Follow up with HEIKE    Labs CMP, CBC and magnesium   Lab interval: once a week  Weekly on fridays starting 3/4   Imaging    Pharmacy appointment No pharmacy appointment needed      Other referrals No additional referrals needed         Treatment Plan Information   OP HEAD NECK CISPLATIN WEEKLY + RADIOTHERAPY   Christopher Jay MD   Upcoming Treatment Dates - OP HEAD NECK CISPLATIN WEEKLY + RADIOTHERAPY    3/7/2022       Chemotherapy       CISplatin (PLATINOL) 40 mg/m2 in sodium chloride 0.9% 500 mL chemo infusion       Pre-Hydration       sodium chloride 0.9% 1,000 mL with magnesium sulfate 1 g, potassium chloride 20 mEq infusion       Post-Hydration       sodium chloride 0.9% bolus 1,000 mL  3/14/2022       Chemotherapy       CISplatin (PLATINOL) 40 mg/m2 in sodium chloride 0.9% 500 mL chemo infusion       Pre-Hydration       sodium chloride 0.9% 1,000 mL with magnesium sulfate 1 g, potassium chloride 20 mEq infusion       Post-Hydration       sodium chloride 0.9% bolus 1,000 mL  3/21/2022       Chemotherapy       CISplatin (PLATINOL) 40 mg/m2 in sodium chloride 0.9% 500 mL chemo infusion       Pre-Hydration       sodium chloride 0.9% 1,000 mL with magnesium  sulfate 1 g, potassium chloride 20 mEq infusion       Post-Hydration       sodium chloride 0.9% bolus 1,000 mL  3/28/2022       Chemotherapy       CISplatin (PLATINOL) 40 mg/m2 in sodium chloride 0.9% 500 mL chemo infusion       Pre-Hydration       sodium chloride 0.9% 1,000 mL with magnesium sulfate 1 g, potassium chloride 20 mEq infusion       Post-Hydration       sodium chloride 0.9% bolus 1,000 mL          Christopher Jay MD  Hematology Oncology

## 2022-02-28 NOTE — PROGRESS NOTES
Fareed Richard Jr. presents for removal of a stable remaining his neck after surgery.  He is beginning chemoradiation.    The remaining staple was removed.  On exam, his incisions have healed nicely.  There is a small focus of crusting granulation in the left lateral aspect of the neck incision.    Assessment and plan:  Doing well.  Begin chemoradiation.  Return 1 month after treatment.

## 2022-03-02 ENCOUNTER — HOSPITAL ENCOUNTER (OUTPATIENT)
Dept: RADIATION THERAPY | Facility: HOSPITAL | Age: 72
Discharge: HOME | End: 2022-03-02
Attending: STUDENT IN AN ORGANIZED HEALTH CARE EDUCATION/TRAINING PROGRAM
Payer: MEDICARE

## 2022-03-02 PROCEDURE — 77014 PR  CT GUIDANCE PLACEMENT RAD THERAPY FIELDS: CPT | Mod: 26,,, | Performed by: STUDENT IN AN ORGANIZED HEALTH CARE EDUCATION/TRAINING PROGRAM

## 2022-03-02 PROCEDURE — 77014 HC CT GUIDANCE RADIATION THERAPY FLDS PLACEMENT: CPT | Mod: TC | Performed by: STUDENT IN AN ORGANIZED HEALTH CARE EDUCATION/TRAINING PROGRAM

## 2022-03-02 PROCEDURE — 77014 PR  CT GUIDANCE PLACEMENT RAD THERAPY FIELDS: ICD-10-PCS | Mod: 26,,, | Performed by: STUDENT IN AN ORGANIZED HEALTH CARE EDUCATION/TRAINING PROGRAM

## 2022-03-02 PROCEDURE — 77386 HC IMRT, COMPLEX: CPT | Performed by: STUDENT IN AN ORGANIZED HEALTH CARE EDUCATION/TRAINING PROGRAM

## 2022-03-03 PROCEDURE — 77014 HC CT GUIDANCE RADIATION THERAPY FLDS PLACEMENT: CPT | Mod: TC | Performed by: STUDENT IN AN ORGANIZED HEALTH CARE EDUCATION/TRAINING PROGRAM

## 2022-03-03 PROCEDURE — 77014 PR  CT GUIDANCE PLACEMENT RAD THERAPY FIELDS: CPT | Mod: 26,,, | Performed by: STUDENT IN AN ORGANIZED HEALTH CARE EDUCATION/TRAINING PROGRAM

## 2022-03-03 PROCEDURE — 77386 HC IMRT, COMPLEX: CPT | Performed by: STUDENT IN AN ORGANIZED HEALTH CARE EDUCATION/TRAINING PROGRAM

## 2022-03-03 PROCEDURE — 77014 PR  CT GUIDANCE PLACEMENT RAD THERAPY FIELDS: ICD-10-PCS | Mod: 26,,, | Performed by: STUDENT IN AN ORGANIZED HEALTH CARE EDUCATION/TRAINING PROGRAM

## 2022-03-04 ENCOUNTER — HOSPITAL ENCOUNTER (OUTPATIENT)
Dept: RADIOLOGY | Facility: HOSPITAL | Age: 72
Discharge: HOME | End: 2022-03-04
Attending: STUDENT IN AN ORGANIZED HEALTH CARE EDUCATION/TRAINING PROGRAM
Payer: MEDICARE

## 2022-03-04 DIAGNOSIS — C02.9 SQUAMOUS CELL CANCER OF TONGUE: Primary | ICD-10-CM

## 2022-03-04 DIAGNOSIS — C02.9 SQUAMOUS CELL CANCER OF TONGUE: ICD-10-CM

## 2022-03-04 DIAGNOSIS — Z90.49 S/P GLOSSECTOMY: ICD-10-CM

## 2022-03-04 DIAGNOSIS — C77.0 SECONDARY MALIGNANT NEOPLASM OF CERVICAL LYMPH NODE: ICD-10-CM

## 2022-03-04 DIAGNOSIS — K11.7 INCREASED OROPHARYNGEAL SECRETIONS: ICD-10-CM

## 2022-03-04 PROCEDURE — 78815 NM PET CT ROUTINE: ICD-10-PCS | Mod: 26,PI,, | Performed by: RADIOLOGY

## 2022-03-04 PROCEDURE — 78815 PET IMAGE W/CT SKULL-THIGH: CPT | Mod: 26,PI,, | Performed by: RADIOLOGY

## 2022-03-04 PROCEDURE — 78815 PET IMAGE W/CT SKULL-THIGH: CPT | Mod: PI,TC

## 2022-03-04 PROCEDURE — 77014 PR  CT GUIDANCE PLACEMENT RAD THERAPY FIELDS: CPT | Mod: 26,,, | Performed by: STUDENT IN AN ORGANIZED HEALTH CARE EDUCATION/TRAINING PROGRAM

## 2022-03-04 PROCEDURE — 77014 PR  CT GUIDANCE PLACEMENT RAD THERAPY FIELDS: ICD-10-PCS | Mod: 26,,, | Performed by: STUDENT IN AN ORGANIZED HEALTH CARE EDUCATION/TRAINING PROGRAM

## 2022-03-04 PROCEDURE — 77386 HC IMRT, COMPLEX: CPT | Performed by: STUDENT IN AN ORGANIZED HEALTH CARE EDUCATION/TRAINING PROGRAM

## 2022-03-04 PROCEDURE — 77014 HC CT GUIDANCE RADIATION THERAPY FLDS PLACEMENT: CPT | Mod: TC | Performed by: STUDENT IN AN ORGANIZED HEALTH CARE EDUCATION/TRAINING PROGRAM

## 2022-03-04 PROCEDURE — A9552 F18 FDG: HCPCS

## 2022-03-07 ENCOUNTER — DOCUMENTATION ONLY (OUTPATIENT)
Dept: RADIATION THERAPY | Facility: HOSPITAL | Age: 72
End: 2022-03-07
Payer: MEDICARE

## 2022-03-07 ENCOUNTER — INFUSION (OUTPATIENT)
Dept: INFUSION THERAPY | Facility: HOSPITAL | Age: 72
End: 2022-03-07
Payer: MEDICARE

## 2022-03-07 ENCOUNTER — DOCUMENTATION ONLY (OUTPATIENT)
Dept: RADIATION ONCOLOGY | Facility: CLINIC | Age: 72
End: 2022-03-07
Payer: MEDICARE

## 2022-03-07 VITALS
DIASTOLIC BLOOD PRESSURE: 63 MMHG | BODY MASS INDEX: 18.98 KG/M2 | HEART RATE: 63 BPM | HEIGHT: 68 IN | WEIGHT: 125.25 LBS | RESPIRATION RATE: 18 BRPM | TEMPERATURE: 98 F | SYSTOLIC BLOOD PRESSURE: 114 MMHG | OXYGEN SATURATION: 95 %

## 2022-03-07 DIAGNOSIS — C02.9 SQUAMOUS CELL CANCER OF TONGUE: ICD-10-CM

## 2022-03-07 DIAGNOSIS — C77.0 SECONDARY MALIGNANT NEOPLASM OF CERVICAL LYMPH NODE: Primary | ICD-10-CM

## 2022-03-07 LAB — POCT GLUCOSE: 99 MG/DL (ref 70–110)

## 2022-03-07 PROCEDURE — 96365 THER/PROPH/DIAG IV INF INIT: CPT

## 2022-03-07 PROCEDURE — 96361 HYDRATE IV INFUSION ADD-ON: CPT

## 2022-03-07 PROCEDURE — 96366 THER/PROPH/DIAG IV INF ADDON: CPT

## 2022-03-07 PROCEDURE — 96413 CHEMO IV INFUSION 1 HR: CPT

## 2022-03-07 PROCEDURE — 77386 HC IMRT, COMPLEX: CPT | Performed by: STUDENT IN AN ORGANIZED HEALTH CARE EDUCATION/TRAINING PROGRAM

## 2022-03-07 PROCEDURE — 96375 TX/PRO/DX INJ NEW DRUG ADDON: CPT

## 2022-03-07 PROCEDURE — 77014 PR  CT GUIDANCE PLACEMENT RAD THERAPY FIELDS: ICD-10-PCS | Mod: 26,,, | Performed by: STUDENT IN AN ORGANIZED HEALTH CARE EDUCATION/TRAINING PROGRAM

## 2022-03-07 PROCEDURE — 96367 TX/PROPH/DG ADDL SEQ IV INF: CPT

## 2022-03-07 PROCEDURE — 63600175 PHARM REV CODE 636 W HCPCS: Mod: JG | Performed by: STUDENT IN AN ORGANIZED HEALTH CARE EDUCATION/TRAINING PROGRAM

## 2022-03-07 PROCEDURE — 77014 PR  CT GUIDANCE PLACEMENT RAD THERAPY FIELDS: CPT | Mod: 26,,, | Performed by: STUDENT IN AN ORGANIZED HEALTH CARE EDUCATION/TRAINING PROGRAM

## 2022-03-07 PROCEDURE — 77014 HC CT GUIDANCE RADIATION THERAPY FLDS PLACEMENT: CPT | Mod: TC | Performed by: STUDENT IN AN ORGANIZED HEALTH CARE EDUCATION/TRAINING PROGRAM

## 2022-03-07 PROCEDURE — 25000003 PHARM REV CODE 250: Performed by: STUDENT IN AN ORGANIZED HEALTH CARE EDUCATION/TRAINING PROGRAM

## 2022-03-07 RX ORDER — HEPARIN 100 UNIT/ML
500 SYRINGE INTRAVENOUS
Status: DISCONTINUED | OUTPATIENT
Start: 2022-03-07 | End: 2022-03-07 | Stop reason: HOSPADM

## 2022-03-07 RX ORDER — SODIUM CHLORIDE 0.9 % (FLUSH) 0.9 %
10 SYRINGE (ML) INJECTION
Status: DISCONTINUED | OUTPATIENT
Start: 2022-03-07 | End: 2022-03-07 | Stop reason: HOSPADM

## 2022-03-07 RX ADMIN — CISPLATIN 66 MG: 1 INJECTION INTRAVENOUS at 10:03

## 2022-03-07 RX ADMIN — APREPITANT 130 MG: 130 INJECTION, EMULSION INTRAVENOUS at 09:03

## 2022-03-07 RX ADMIN — MAGNESIUM SULFATE HEPTAHYDRATE 500 ML/HR: 500 INJECTION, SOLUTION INTRAMUSCULAR; INTRAVENOUS at 07:03

## 2022-03-07 RX ADMIN — SODIUM CHLORIDE 1000 ML: 9 INJECTION, SOLUTION INTRAVENOUS at 11:03

## 2022-03-07 RX ADMIN — PALONOSETRON HYDROCHLORIDE 0.25 MG: 0.25 INJECTION, SOLUTION INTRAVENOUS at 09:03

## 2022-03-07 NOTE — PROGRESS NOTES
Oncology Nutrition  Radiation Oncology Weekly Check     Treatment Week: 2 (Day 5)    Fareed Richard    1950    Reason for Visit: Pt here for weekly visit due to high nutritional risk radiation therapy treatment (squamous cell carcinoma of the oral tongue)    Nutrition Assessment    Patient reports he tolerated his first week of treatment well. Denies any significant side effects including nausea. He is NPO since surgery. Has had initial eval with home Speech therapist. Using 6 cartons of Isosource 1.5 daily (1.5 cartons x4 feedings) though his PEG tube with 30mL water before and 50mL after each feeding. Using some additional water for Mariajose Lax and other medication administration. He is gaining weight. Lowest was around 124lb after surgery.   Reports occasional constipation.      Weight: 141.9lb (artifically high due to chemo/fluids)  Height: 68in    Usual BW: 162lb  Weight Change: down 20lb from usual weight but increased 10-15lb since surgery     Nutrition Impact Symptoms    Dysphagia   Constipation     Nutrition Recommendations    Continue current tube feeding regimen as this is allowing for adequate weight gain   Additional hydration between tube feeds (recommended 8oz one hour after each feeding)   Mariajose ladx daily for better bowel management (half doses ok if full dose is too much)    Follow up weekly during treatment       Elida Rene, MPH, RD, , LDN, FAND   [phone]

## 2022-03-08 ENCOUNTER — TELEPHONE (OUTPATIENT)
Dept: HEMATOLOGY/ONCOLOGY | Facility: CLINIC | Age: 72
End: 2022-03-08
Payer: MEDICARE

## 2022-03-08 PROCEDURE — 77336 RADIATION PHYSICS CONSULT: CPT | Performed by: STUDENT IN AN ORGANIZED HEALTH CARE EDUCATION/TRAINING PROGRAM

## 2022-03-08 NOTE — TELEPHONE ENCOUNTER
----- Message from Humberto Del Valle sent at 3/8/2022 12:32 PM CST -----  Fareed w/ [facility] called to speak w/ someone in regards to having an updated med list for the patient sent via fax  [phone]     Fax [phone]

## 2022-03-09 PROCEDURE — 77386 HC IMRT, COMPLEX: CPT | Performed by: STUDENT IN AN ORGANIZED HEALTH CARE EDUCATION/TRAINING PROGRAM

## 2022-03-09 PROCEDURE — 77014 PR  CT GUIDANCE PLACEMENT RAD THERAPY FIELDS: ICD-10-PCS | Mod: 26,,, | Performed by: STUDENT IN AN ORGANIZED HEALTH CARE EDUCATION/TRAINING PROGRAM

## 2022-03-09 PROCEDURE — 77014 HC CT GUIDANCE RADIATION THERAPY FLDS PLACEMENT: CPT | Mod: TC | Performed by: STUDENT IN AN ORGANIZED HEALTH CARE EDUCATION/TRAINING PROGRAM

## 2022-03-09 PROCEDURE — 77014 PR  CT GUIDANCE PLACEMENT RAD THERAPY FIELDS: CPT | Mod: 26,,, | Performed by: STUDENT IN AN ORGANIZED HEALTH CARE EDUCATION/TRAINING PROGRAM

## 2022-03-10 PROCEDURE — 77014 HC CT GUIDANCE RADIATION THERAPY FLDS PLACEMENT: CPT | Mod: TC | Performed by: STUDENT IN AN ORGANIZED HEALTH CARE EDUCATION/TRAINING PROGRAM

## 2022-03-10 PROCEDURE — 77014 PR  CT GUIDANCE PLACEMENT RAD THERAPY FIELDS: ICD-10-PCS | Mod: 26,,, | Performed by: STUDENT IN AN ORGANIZED HEALTH CARE EDUCATION/TRAINING PROGRAM

## 2022-03-10 PROCEDURE — 77014 PR  CT GUIDANCE PLACEMENT RAD THERAPY FIELDS: CPT | Mod: 26,,, | Performed by: STUDENT IN AN ORGANIZED HEALTH CARE EDUCATION/TRAINING PROGRAM

## 2022-03-10 PROCEDURE — 77386 HC IMRT, COMPLEX: CPT | Performed by: STUDENT IN AN ORGANIZED HEALTH CARE EDUCATION/TRAINING PROGRAM

## 2022-03-11 PROCEDURE — 77014 PR  CT GUIDANCE PLACEMENT RAD THERAPY FIELDS: ICD-10-PCS | Mod: 26,,, | Performed by: STUDENT IN AN ORGANIZED HEALTH CARE EDUCATION/TRAINING PROGRAM

## 2022-03-11 PROCEDURE — 77014 HC CT GUIDANCE RADIATION THERAPY FLDS PLACEMENT: CPT | Mod: TC | Performed by: STUDENT IN AN ORGANIZED HEALTH CARE EDUCATION/TRAINING PROGRAM

## 2022-03-11 PROCEDURE — 77014 PR  CT GUIDANCE PLACEMENT RAD THERAPY FIELDS: CPT | Mod: 26,,, | Performed by: STUDENT IN AN ORGANIZED HEALTH CARE EDUCATION/TRAINING PROGRAM

## 2022-03-11 PROCEDURE — 77386 HC IMRT, COMPLEX: CPT | Performed by: STUDENT IN AN ORGANIZED HEALTH CARE EDUCATION/TRAINING PROGRAM

## 2022-03-14 ENCOUNTER — DOCUMENTATION ONLY (OUTPATIENT)
Dept: RADIATION ONCOLOGY | Facility: CLINIC | Age: 72
End: 2022-03-14
Payer: MEDICARE

## 2022-03-14 ENCOUNTER — OFFICE VISIT (OUTPATIENT)
Dept: HEMATOLOGY/ONCOLOGY | Facility: CLINIC | Age: 72
End: 2022-03-14
Payer: MEDICARE

## 2022-03-14 ENCOUNTER — LAB VISIT (OUTPATIENT)
Dept: LAB | Facility: HOSPITAL | Age: 72
End: 2022-03-14
Attending: STUDENT IN AN ORGANIZED HEALTH CARE EDUCATION/TRAINING PROGRAM
Payer: MEDICARE

## 2022-03-14 ENCOUNTER — DOCUMENTATION ONLY (OUTPATIENT)
Dept: RADIATION THERAPY | Facility: HOSPITAL | Age: 72
End: 2022-03-14
Payer: MEDICARE

## 2022-03-14 VITALS
SYSTOLIC BLOOD PRESSURE: 133 MMHG | WEIGHT: 136.5 LBS | OXYGEN SATURATION: 98 % | DIASTOLIC BLOOD PRESSURE: 61 MMHG | HEART RATE: 61 BPM | RESPIRATION RATE: 18 BRPM | BODY MASS INDEX: 20.69 KG/M2 | HEIGHT: 68 IN

## 2022-03-14 DIAGNOSIS — E44.0 MODERATE PROTEIN-CALORIE MALNUTRITION: ICD-10-CM

## 2022-03-14 DIAGNOSIS — T45.1X5A CHEMOTHERAPY-INDUCED NAUSEA: ICD-10-CM

## 2022-03-14 DIAGNOSIS — J44.9 CHRONIC OBSTRUCTIVE PULMONARY DISEASE WITH HYPOXIA: ICD-10-CM

## 2022-03-14 DIAGNOSIS — E87.5 HYPERKALEMIA: ICD-10-CM

## 2022-03-14 DIAGNOSIS — R11.0 CHEMOTHERAPY-INDUCED NAUSEA: ICD-10-CM

## 2022-03-14 DIAGNOSIS — J96.11 CHRONIC RESPIRATORY FAILURE WITH HYPOXIA, ON HOME O2 THERAPY: ICD-10-CM

## 2022-03-14 DIAGNOSIS — C02.9 SQUAMOUS CELL CANCER OF TONGUE: ICD-10-CM

## 2022-03-14 DIAGNOSIS — Z99.81 CHRONIC RESPIRATORY FAILURE WITH HYPOXIA, ON HOME O2 THERAPY: ICD-10-CM

## 2022-03-14 DIAGNOSIS — Z93.1 GASTROSTOMY PRESENT: ICD-10-CM

## 2022-03-14 DIAGNOSIS — C02.9 SQUAMOUS CELL CANCER OF TONGUE: Primary | ICD-10-CM

## 2022-03-14 DIAGNOSIS — D64.9 NORMOCYTIC ANEMIA: ICD-10-CM

## 2022-03-14 DIAGNOSIS — C77.0 SECONDARY MALIGNANT NEOPLASM OF CERVICAL LYMPH NODE: ICD-10-CM

## 2022-03-14 LAB
ALBUMIN SERPL BCP-MCNC: 3.1 G/DL (ref 3.5–5.2)
ALP SERPL-CCNC: 88 U/L (ref 55–135)
ALT SERPL W/O P-5'-P-CCNC: 18 U/L (ref 10–44)
ANION GAP SERPL CALC-SCNC: 7 MMOL/L (ref 8–16)
AST SERPL-CCNC: 18 U/L (ref 10–40)
BASOPHILS # BLD AUTO: 0.03 K/UL (ref 0–0.2)
BASOPHILS NFR BLD: 0.4 % (ref 0–1.9)
BILIRUB SERPL-MCNC: 0.2 MG/DL (ref 0.1–1)
BUN SERPL-MCNC: 24 MG/DL (ref 8–23)
CALCIUM SERPL-MCNC: 9.7 MG/DL (ref 8.7–10.5)
CHLORIDE SERPL-SCNC: 92 MMOL/L (ref 95–110)
CO2 SERPL-SCNC: 36 MMOL/L (ref 23–29)
CREAT SERPL-MCNC: 0.6 MG/DL (ref 0.5–1.4)
DIFFERENTIAL METHOD: ABNORMAL
EOSINOPHIL # BLD AUTO: 0.2 K/UL (ref 0–0.5)
EOSINOPHIL NFR BLD: 3 % (ref 0–8)
ERYTHROCYTE [DISTWIDTH] IN BLOOD BY AUTOMATED COUNT: 14.4 % (ref 11.5–14.5)
EST. GFR  (AFRICAN AMERICAN): >60 ML/MIN/1.73 M^2
EST. GFR  (NON AFRICAN AMERICAN): >60 ML/MIN/1.73 M^2
GLUCOSE SERPL-MCNC: 89 MG/DL (ref 70–110)
HCT VFR BLD AUTO: 31.3 % (ref 40–54)
HGB BLD-MCNC: 9.3 G/DL (ref 14–18)
IMM GRANULOCYTES # BLD AUTO: 0.15 K/UL (ref 0–0.04)
IMM GRANULOCYTES NFR BLD AUTO: 2.1 % (ref 0–0.5)
LYMPHOCYTES # BLD AUTO: 0.4 K/UL (ref 1–4.8)
LYMPHOCYTES NFR BLD: 6.2 % (ref 18–48)
MAGNESIUM SERPL-MCNC: 2.1 MG/DL (ref 1.6–2.6)
MCH RBC QN AUTO: 27.9 PG (ref 27–31)
MCHC RBC AUTO-ENTMCNC: 29.7 G/DL (ref 32–36)
MCV RBC AUTO: 94 FL (ref 82–98)
MONOCYTES # BLD AUTO: 0.8 K/UL (ref 0.3–1)
MONOCYTES NFR BLD: 10.7 % (ref 4–15)
NEUTROPHILS # BLD AUTO: 5.5 K/UL (ref 1.8–7.7)
NEUTROPHILS NFR BLD: 77.6 % (ref 38–73)
NRBC BLD-RTO: 0 /100 WBC
PLATELET # BLD AUTO: 209 K/UL (ref 150–450)
PMV BLD AUTO: 9.4 FL (ref 9.2–12.9)
POTASSIUM SERPL-SCNC: 5.6 MMOL/L (ref 3.5–5.1)
PROT SERPL-MCNC: 6.2 G/DL (ref 6–8.4)
RBC # BLD AUTO: 3.33 M/UL (ref 4.6–6.2)
SODIUM SERPL-SCNC: 135 MMOL/L (ref 136–145)
WBC # BLD AUTO: 7.08 K/UL (ref 3.9–12.7)

## 2022-03-14 PROCEDURE — 77386 HC IMRT, COMPLEX: CPT | Performed by: STUDENT IN AN ORGANIZED HEALTH CARE EDUCATION/TRAINING PROGRAM

## 2022-03-14 PROCEDURE — 99215 OFFICE O/P EST HI 40 MIN: CPT | Mod: S$GLB,,, | Performed by: STUDENT IN AN ORGANIZED HEALTH CARE EDUCATION/TRAINING PROGRAM

## 2022-03-14 PROCEDURE — 99215 PR OFFICE/OUTPT VISIT, EST, LEVL V, 40-54 MIN: ICD-10-PCS | Mod: S$GLB,,, | Performed by: STUDENT IN AN ORGANIZED HEALTH CARE EDUCATION/TRAINING PROGRAM

## 2022-03-14 PROCEDURE — 99999 PR PBB SHADOW E&M-EST. PATIENT-LVL III: CPT | Mod: PBBFAC,,, | Performed by: STUDENT IN AN ORGANIZED HEALTH CARE EDUCATION/TRAINING PROGRAM

## 2022-03-14 PROCEDURE — 77014 HC CT GUIDANCE RADIATION THERAPY FLDS PLACEMENT: CPT | Mod: TC | Performed by: STUDENT IN AN ORGANIZED HEALTH CARE EDUCATION/TRAINING PROGRAM

## 2022-03-14 PROCEDURE — 77014 PR  CT GUIDANCE PLACEMENT RAD THERAPY FIELDS: CPT | Mod: 26,,, | Performed by: STUDENT IN AN ORGANIZED HEALTH CARE EDUCATION/TRAINING PROGRAM

## 2022-03-14 PROCEDURE — 77014 PR  CT GUIDANCE PLACEMENT RAD THERAPY FIELDS: ICD-10-PCS | Mod: 26,,, | Performed by: STUDENT IN AN ORGANIZED HEALTH CARE EDUCATION/TRAINING PROGRAM

## 2022-03-14 PROCEDURE — 83735 ASSAY OF MAGNESIUM: CPT | Performed by: STUDENT IN AN ORGANIZED HEALTH CARE EDUCATION/TRAINING PROGRAM

## 2022-03-14 PROCEDURE — 3078F PR MOST RECENT DIASTOLIC BLOOD PRESSURE < 80 MM HG: ICD-10-PCS | Mod: CPTII,S$GLB,, | Performed by: STUDENT IN AN ORGANIZED HEALTH CARE EDUCATION/TRAINING PROGRAM

## 2022-03-14 PROCEDURE — 3078F DIAST BP <80 MM HG: CPT | Mod: CPTII,S$GLB,, | Performed by: STUDENT IN AN ORGANIZED HEALTH CARE EDUCATION/TRAINING PROGRAM

## 2022-03-14 PROCEDURE — 3075F SYST BP GE 130 - 139MM HG: CPT | Mod: CPTII,S$GLB,, | Performed by: STUDENT IN AN ORGANIZED HEALTH CARE EDUCATION/TRAINING PROGRAM

## 2022-03-14 PROCEDURE — 3075F PR MOST RECENT SYSTOLIC BLOOD PRESS GE 130-139MM HG: ICD-10-PCS | Mod: CPTII,S$GLB,, | Performed by: STUDENT IN AN ORGANIZED HEALTH CARE EDUCATION/TRAINING PROGRAM

## 2022-03-14 PROCEDURE — 4010F ACE/ARB THERAPY RXD/TAKEN: CPT | Mod: CPTII,S$GLB,, | Performed by: STUDENT IN AN ORGANIZED HEALTH CARE EDUCATION/TRAINING PROGRAM

## 2022-03-14 PROCEDURE — 3008F PR BODY MASS INDEX (BMI) DOCUMENTED: ICD-10-PCS | Mod: CPTII,S$GLB,, | Performed by: STUDENT IN AN ORGANIZED HEALTH CARE EDUCATION/TRAINING PROGRAM

## 2022-03-14 PROCEDURE — 4010F PR ACE/ARB THEARPY RXD/TAKEN: ICD-10-PCS | Mod: CPTII,S$GLB,, | Performed by: STUDENT IN AN ORGANIZED HEALTH CARE EDUCATION/TRAINING PROGRAM

## 2022-03-14 PROCEDURE — 3008F BODY MASS INDEX DOCD: CPT | Mod: CPTII,S$GLB,, | Performed by: STUDENT IN AN ORGANIZED HEALTH CARE EDUCATION/TRAINING PROGRAM

## 2022-03-14 PROCEDURE — 99999 PR PBB SHADOW E&M-EST. PATIENT-LVL III: ICD-10-PCS | Mod: PBBFAC,,, | Performed by: STUDENT IN AN ORGANIZED HEALTH CARE EDUCATION/TRAINING PROGRAM

## 2022-03-14 PROCEDURE — 85025 COMPLETE CBC W/AUTO DIFF WBC: CPT | Performed by: STUDENT IN AN ORGANIZED HEALTH CARE EDUCATION/TRAINING PROGRAM

## 2022-03-14 PROCEDURE — 1126F AMNT PAIN NOTED NONE PRSNT: CPT | Mod: CPTII,S$GLB,, | Performed by: STUDENT IN AN ORGANIZED HEALTH CARE EDUCATION/TRAINING PROGRAM

## 2022-03-14 PROCEDURE — 36415 COLL VENOUS BLD VENIPUNCTURE: CPT | Performed by: STUDENT IN AN ORGANIZED HEALTH CARE EDUCATION/TRAINING PROGRAM

## 2022-03-14 PROCEDURE — 1126F PR PAIN SEVERITY QUANTIFIED, NO PAIN PRESENT: ICD-10-PCS | Mod: CPTII,S$GLB,, | Performed by: STUDENT IN AN ORGANIZED HEALTH CARE EDUCATION/TRAINING PROGRAM

## 2022-03-14 PROCEDURE — 80053 COMPREHEN METABOLIC PANEL: CPT | Performed by: STUDENT IN AN ORGANIZED HEALTH CARE EDUCATION/TRAINING PROGRAM

## 2022-03-14 RX ORDER — ONDANSETRON 8 MG/1
8 TABLET, ORALLY DISINTEGRATING ORAL EVERY 8 HOURS PRN
Qty: 30 TABLET | Refills: 1 | Status: SHIPPED | OUTPATIENT
Start: 2022-03-14 | End: 2023-03-14

## 2022-03-14 RX ORDER — SODIUM CHLORIDE 0.9 % (FLUSH) 0.9 %
10 SYRINGE (ML) INJECTION
Status: CANCELLED | OUTPATIENT
Start: 2022-03-16

## 2022-03-14 RX ORDER — HEPARIN 100 UNIT/ML
500 SYRINGE INTRAVENOUS
Status: CANCELLED | OUTPATIENT
Start: 2022-03-16

## 2022-03-14 NOTE — ASSESSMENT & PLAN NOTE
Known COPD, used to use supplemental O2 only at night but since starting chemoradiation, has had increase in secretions and is using it 24/7 now.  Labs significant compensated acidosis. Differentials include fluid overload. Physical exam diminished breath sounds throughout all lung fields.  -CXR and BNP  -mucinex for his cough/secretions  -continue O2 and inhalers

## 2022-03-14 NOTE — ASSESSMENT & PLAN NOTE
Stable since his prior visit. No obvious nutrient deficiencies.  -monitor cbc during treatment  -continue tube feeds

## 2022-03-14 NOTE — PROGRESS NOTES
Oncology Nutrition  Radiation Oncology Weekly Check     Treatment Week: 3 (Day 9)    Fareed Richard JrMalia   1950    Reason for Visit: Pt here for weekly visit due to high nutritional risk radiation therapy treatment (squamous cell carcinoma of the oral tongue)    Nutrition Assessment    Patient reports little change from last week. He is still tolerating treatment well. Denies any significant side effects including nausea. Using Mariajose Lax daily as recommended and having regular bowel movements.   He remains NPO. Using 6 cartons of Isosource 1.5 daily (1.5 cartons x4 feedings) though his PEG tube and continues to tolerate it well. Weight is stable.    Weight:  136.5lb   Height: 68in    Usual BW: 162lb  Weight Change: down 20lb from usual weight but increased 10-15lb since surgery     Nutrition Impact Symptoms    Dysphagia   Constipation (improved with Mariajose Lax)     Nutrition Recommendations    Continue current tube feeding regimen as this is allowing for adequate weight gain   Continue Mariajose Lax daily for constipation     Follow up weekly or as needed during treatment       Elida Rene, MPH, RD, , LDN, FAND   [phone]

## 2022-03-14 NOTE — ASSESSMENT & PLAN NOTE
Stage IVB squamous cell carcinoma of the tongue with contralateral cervical lymph node involvement, +margins, +SALINAS (microscopic).  S/p peg tube placement.  -labs reviewed; ok to proceed with cisplatin.  -follow up with me prior to cycle 6  -support medications:  zofran ODT prn nausea.

## 2022-03-14 NOTE — PROGRESS NOTES
PATIENT: Fareed Richard Jr.  MRN: [id number]  DATE: 3/14/2022      Diagnosis:   1. Squamous cell cancer of tongue    2. Secondary malignant neoplasm of cervical lymph node    3. Moderate protein-calorie malnutrition    4. Normocytic anemia    5. Chemotherapy-induced nausea    6. Hyperkalemia    7. Chronic obstructive pulmonary disease with hypoxia    8. Gastrostomy present    9. Chronic respiratory failure with hypoxia, on home O2 therapy        Chief Complaint: Squamous cell cancer of tongue      Oncologic History:    Oncologic History 1. Squamous cell carcinoma of tongue    Oncologic Treatment 1.  1/4/22 total glossectomy, bilateral neck dissection, bilateral cervical facial advancement flaps and anterolateral thigh free flap reconstruction of his glossectomy defect   2. 2/28/22 adjuvant chemoradiation with cisplatin    Pathology Squamous cell carcinoma, poorly differentiated, p16 negative  Final pathology: pT4a:  Moderately advanced local disease.  Tumor size = 6.0 cm with DOI = 29 mm and invades adjacent structures.   pN3b:  Metastasis in single contralateral node, SALINAS(+).   pM:  Not applicable.   Superior soft tissue margin of the left neck is involved by tumor           Subjective:    Interval History: Mr. Richard is here for follow up    Initially saw Aletha Cook NP (ENT) 11/29/21 regarding non-healing left sided tongue lesion for about 6 weeks.  Associated symptoms included tongue pain, bleeding, left otalgia, weight loss from difficulty eating, and bump under left chin that enlarged and was refractory to oral antibiotics and nystatin.   History significant for skin cancer of the left cheek and right lower lip about 5-6 years ago. He reported he had a flap done with Dr. Starks. His wife notes that one of the cancers was SCC and the other was basal cell but she cannot call which was which. 12/6/21 CT neck was done (findings below). 12/15/21 he saw Dr. Smalls who performed an FNA of his left neck mass, which  finalized as squamous cell carcinoma, p16 negative.   12/16/21 CT chest without contrast was done (findings below).  1/4/22 underwent total glossectomy, bilateral neck dissection, bilateral cervical facial advancement flaps and anterolateral thigh free flap reconstruction of his glossectomy defect.  Final pathology [id number], +SALINAS (microscopic), +margin (superior soft tissue margin of left neck).    He has completed 2 weeks of chemoradiation.  No issues with nausea or peripheral neuropathy. Main new symptom is increased dependence on supplemental O2 (new).  Other chronic symptoms include excess saliva and coughing secretions.  Denies any specific dyspnea after infusion or orthopnea.  He has a peg tube.  He denies any issues getting peripheral IVs or labs.  Tube feed dependent; all nutrition is via peg tube. S/p glossectomy.    Former smoker, 110 pack years.    Past Medical History:   Past Medical History:   Diagnosis Date    Cancer     skin to Rt forearm and face    COPD (chronic obstructive pulmonary disease)     Hyperlipidemia     Hypertension     Pseudoaneurysm        Past Surgical HIstory:   Past Surgical History:   Procedure Laterality Date    ABDOMINAL SURGERY      stents placed in liver and large intestines, per patient    CAROTID STENT      CORONARY STENT PLACEMENT  01/2000    DISSECTION OF NECK Bilateral 1/4/2022    Procedure: DISSECTION, NECK;  Surgeon: Naeem Smalls MD;  Location: [facility] OR [address] [place] [address];  Service: ENT;  Laterality: Bilateral;    ESOPHAGOGASTRODUODENOSCOPY W/ PEG N/A 1/4/2022    Procedure: EGD, WITH PEG TUBE INSERTION;  Surgeon: Anthony Calabrese MD;  Location: [facility] OR [address] [place] [address];  Service: General;  Laterality: N/A;    EYE SURGERY      Cataract bilateral    femoral stents      bilateral    FLAP PROCEDURE N/A 1/3/2022    Procedure: CREATION, FREE FLAP;  Surgeon: Naeem Smalls MD;  Location: [facility] OR [address] [place] [address];  Service: ENT;  Laterality: N/A;    FLAP PROCEDURE Right 1/4/2022     Procedure: CREATION, FREE FLAP;  Surgeon: Stacey Conde MD;  [facility];  Service: ENT;  Laterality: Right;  Ischemic start 1203  Ischemic stop 1353    GLOSSECTOMY N/A 1/4/2022    Procedure: GLOSSECTOMY;  Surgeon: Naeem Smalls MD;  [facility];  Service: ENT;  Laterality: N/A;    RADICAL NECK DISSECTION Left 1/3/2022    Procedure: DISSECTION, NECK, RADICAL;  Surgeon: Naeem Smalls MD;  [facility];  Service: ENT;  Laterality: Left;    SKIN CANCER EXCISION      TRACHEOTOMY N/A 1/4/2022    Procedure: TRACHEOTOMY;  Surgeon: Naeem Smalls MD;  Location: [facility] OR [address] [place] [address];  Service: ENT;  Laterality: N/A;       Family History: No family history on file.    Social History:  reports that he quit smoking about 3 years ago. His smoking use included cigarettes. He started smoking about 58 years ago. He has a 80.00 pack-year smoking history. He has never used smokeless tobacco. He reports current alcohol use of about 3.0 standard drinks of alcohol per week. He reports that he does not use drugs.    Allergies:  Review of patient's allergies indicates:  No Known Allergies    Medications:  Current Outpatient Medications   Medication Sig Dispense Refill    acetaminophen (TYLENOL) 325 MG tablet 2 tablets (650 mg total) by Per G Tube route every 6 (six) hours.  0    albuterol-ipratropium (DUO-NEB) 2.5 mg-0.5 mg/3 mL nebulizer solution Take 3 mLs by nebulization every 4 (four) hours as needed for Wheezing. Rescue 75 mL 0    amLODIPine (NORVASC) 10 MG tablet Take 10 mg by mouth once daily.      aspirin 81 MG Chew 1 tablet (81 mg total) by Per G Tube route once daily.  0    atorvastatin (LIPITOR) 20 MG tablet 1 tablet (20 mg total) by Per G Tube route once daily. 90 tablet 3    bisacodyL (DULCOLAX) 10 mg Supp Place 1 suppository (10 mg total) rectally daily as needed.  0    clopidogreL (PLAVIX) 75 mg tablet 1 tablet (75 mg total) by Per G Tube route once  daily. (Patient not taking: No sig reported) 30 tablet 11    enoxaparin (LOVENOX) 40 mg/0.4 mL Syrg Inject 0.4 mLs (40 mg total) into the skin once daily. (Patient not taking: No sig reported)      folic acid (FOLVITE) 1 MG tablet 1 tablet (1 mg total) by Per G Tube route once daily. (Patient not taking: No sig reported)  0    guaiFENesin 200 mg/5 mL Liqd Take 400 mg by mouth every 4 (four) hours as needed (for secretions). 473 mL 3    losartan (COZAAR) 50 MG tablet 1 tablet (50 mg total) by Per G Tube route once daily. 90 tablet 3    melatonin (MELATIN) 3 mg tablet 2 tablets (6 mg total) by Per G Tube route nightly. (Patient not taking: No sig reported)  0    metoprolol tartrate (LOPRESSOR) 100 MG tablet 1 tablet (100 mg total) by Per G Tube route 2 (two) times daily. 60 tablet 11    ondansetron (ZOFRAN-ODT) 8 MG TbDL Take 1 tablet (8 mg total) by mouth every 8 (eight) hours as needed (nausea). 30 tablet 1    oxyCODONE (ROXICODONE) 5 mg/5 mL Soln 5 mLs (5 mg total) by Per G Tube route every 4 (four) hours as needed. (Patient not taking: No sig reported)  0    polyethylene glycol (GLYCOLAX) 17 gram PwPk 17 g by Per G Tube route 2 (two) times daily. (Patient not taking: No sig reported)  0    senna-docusate 8.6-50 mg (PERICOLACE) 8.6-50 mg per tablet 1 tablet by Per G Tube route 2 (two) times daily. (Patient not taking: No sig reported)      sodium chloride (OCEAN) 0.65 % nasal spray 1 spray by Nasal route as needed for Congestion. (Patient not taking: No sig reported)  12    sodium chloride 3% 3 % nebulizer solution Take 4 mLs by nebulization every 8 (eight) hours.      sodium polystyrene (KAYEXALATE) 15 gram/60 mL Susp 60 mLs (15 g total) by Per G Tube route once. for 1 dose 60 mL 0    thiamine 100 MG tablet 1 tablet (100 mg total) by Per G Tube route once daily. (Patient not taking: No sig reported)       No current facility-administered medications for this visit.       Review of Systems  "  Constitutional: Negative for activity change, appetite change, chills, diaphoresis, fatigue, fever and unexpected weight change.   HENT: Positive for trouble swallowing and voice change. Negative for ear pain and nosebleeds.    Eyes: Negative for visual disturbance.   Respiratory: Positive for cough and shortness of breath. Negative for chest tightness and wheezing.    Cardiovascular: Negative for chest pain and leg swelling.   Gastrointestinal: Negative for abdominal distention, abdominal pain, blood in stool, constipation, diarrhea, nausea and vomiting.   Endocrine: Negative for cold intolerance and heat intolerance.   Genitourinary: Negative for difficulty urinating and dysuria.   Musculoskeletal: Negative for arthralgias and back pain.   Skin: Negative for color change.   Neurological: Positive for weakness. Negative for dizziness, light-headedness, numbness and headaches.   Hematological: Negative for adenopathy. Does not bruise/bleed easily.   Psychiatric/Behavioral: Negative for confusion.       ECOG Performance Status:     ECOG SCORE    2 - Capable of all selfcare but unable to carry out any work activities, active > 50% of hours         Objective:      Vitals:   Vitals:    03/14/22 1443   BP: 133/61   BP Location: Right arm   Patient Position: Sitting   BP Method: Medium (Automatic)   Pulse: 61   Resp: 18   SpO2: 98%   Weight: 61.9 kg (136 lb 8 oz)   Height: 5' 8" (1.727 m)     BMI: Body mass index is 20.75 kg/m².    Physical Exam  Constitutional:       General: He is not in acute distress.     Appearance: Normal appearance. He is ill-appearing.   HENT:      Head: Normocephalic and atraumatic.      Mouth/Throat:      Pharynx: No oropharyngeal exudate or posterior oropharyngeal erythema.   Eyes:      General: No scleral icterus.     Extraocular Movements: Extraocular movements intact.      Conjunctiva/sclera: Conjunctivae normal.      Pupils: Pupils are equal, round, and reactive to light.   Neck:      " Comments: +trach  Cardiovascular:      Rate and Rhythm: Normal rate and regular rhythm.      Heart sounds: No murmur heard.    No friction rub. No gallop.   Pulmonary:      Effort: Pulmonary effort is normal. No respiratory distress.      Breath sounds: No stridor. No wheezing, rhonchi or rales.      Comments: Diminished breath sounds all lung fields  Abdominal:      General: Bowel sounds are normal. There is no distension.      Palpations: Abdomen is soft. There is no mass.      Tenderness: There is no abdominal tenderness. There is no guarding or rebound.      Comments: +peg tube site c/d/i   Musculoskeletal:         General: Normal range of motion.      Cervical back: Normal range of motion and neck supple.      Right lower leg: No edema.      Left lower leg: No edema.   Skin:     General: Skin is warm and dry.   Neurological:      General: No focal deficit present.      Mental Status: He is alert.      Motor: Weakness present.         Laboratory Data:   Recent Results (from the past 168 hour(s))   CBC Auto Differential    Collection Time: 03/14/22  1:22 PM   Result Value Ref Range    WBC 7.08 3.90 - 12.70 K/uL    RBC 3.33 (L) 4.60 - 6.20 M/uL    Hemoglobin 9.3 (L) 14.0 - 18.0 g/dL    Hematocrit 31.3 (L) 40.0 - 54.0 %    MCV 94 82 - 98 fL    MCH 27.9 27.0 - 31.0 pg    MCHC 29.7 (L) 32.0 - 36.0 g/dL    RDW 14.4 11.5 - 14.5 %    Platelets 209 150 - 450 K/uL    MPV 9.4 9.2 - 12.9 fL    Immature Granulocytes 2.1 (H) 0.0 - 0.5 %    Gran # (ANC) 5.5 1.8 - 7.7 K/uL    Immature Grans (Abs) 0.15 (H) 0.00 - 0.04 K/uL    Lymph # 0.4 (L) 1.0 - 4.8 K/uL    Mono # 0.8 0.3 - 1.0 K/uL    Eos # 0.2 0.0 - 0.5 K/uL    Baso # 0.03 0.00 - 0.20 K/uL    nRBC 0 0 /100 WBC    Gran % 77.6 (H) 38.0 - 73.0 %    Lymph % 6.2 (L) 18.0 - 48.0 %    Mono % 10.7 4.0 - 15.0 %    Eosinophil % 3.0 0.0 - 8.0 %    Basophil % 0.4 0.0 - 1.9 %    Differential Method Automated    Comprehensive Metabolic Panel    Collection Time: 03/14/22  1:22 PM   Result  Value Ref Range    Sodium 135 (L) 136 - 145 mmol/L    Potassium 5.6 (H) 3.5 - 5.1 mmol/L    Chloride 92 (L) 95 - 110 mmol/L    CO2 36 (H) 23 - 29 mmol/L    Glucose 89 70 - 110 mg/dL    BUN 24 (H) 8 - 23 mg/dL    Creatinine 0.6 0.5 - 1.4 mg/dL    Calcium 9.7 8.7 - 10.5 mg/dL    Total Protein 6.2 6.0 - 8.4 g/dL    Albumin 3.1 (L) 3.5 - 5.2 g/dL    Total Bilirubin 0.2 0.1 - 1.0 mg/dL    Alkaline Phosphatase 88 55 - 135 U/L    AST 18 10 - 40 U/L    ALT 18 10 - 44 U/L    Anion Gap 7 (L) 8 - 16 mmol/L    eGFR if African American >60.0 >60 mL/min/1.73 m^2    eGFR if non African American >60.0 >60 mL/min/1.73 m^2   Magnesium    Collection Time: 03/14/22  1:22 PM   Result Value Ref Range    Magnesium 2.1 1.6 - 2.6 mg/dL       Imaging:         Assessment:       1. Squamous cell cancer of tongue    2. Secondary malignant neoplasm of cervical lymph node    3. Moderate protein-calorie malnutrition    4. Normocytic anemia    5. Chemotherapy-induced nausea    6. Hyperkalemia    7. Chronic obstructive pulmonary disease with hypoxia    8. Gastrostomy present    9. Chronic respiratory failure with hypoxia, on home O2 therapy           Plan:       Problem List Items Addressed This Visit        Pulmonary    Chronic respiratory failure with hypoxia, on home O2 therapy    Current Assessment & Plan     Known COPD, used to use supplemental O2 only at night but since starting chemoradiation, has had increase in secretions and is using it 24/7 now.  Labs significant compensated acidosis. Differentials include fluid overload. Physical exam diminished breath sounds throughout all lung fields.  -CXR and BNP  -mucinex for his cough/secretions  -continue O2 and inhalers              Renal/    Hyperkalemia    Current Assessment & Plan     Persistent hyperkalemia, mild, asymptomatic  -prescribed dose of kayexelate           Relevant Medications    sodium polystyrene (KAYEXALATE) 15 gram/60 mL Susp       Oncology    Squamous cell cancer of tongue  - Primary    Overview     12/15/21 FNA left neck mass : squamous cell carcinoma, p16 negative  1/4/22 total glossectomy, bilateral neck dissection, bilateral cervical facial advancement flaps and anterolateral thigh free flap reconstruction of his glossectomy defect.  Final path :  [id number] (+microscopic SALINAS, single contralateral node), superior soft tissue margin of left neck with tumor.    2/28/22 start chemoradiation           Current Assessment & Plan     Stage IVB squamous cell carcinoma of the tongue with contralateral cervical lymph node involvement, +margins, +SALINAS (microscopic).  S/p peg tube placement.  -labs reviewed; ok to proceed with cisplatin.  -follow up with me prior to cycle 5  -support medications:  zofran ODT prn nausea.           Relevant Medications    ondansetron (ZOFRAN-ODT) 8 MG TbDL    guaiFENesin 200 mg/5 mL Liqd    Normocytic anemia    Current Assessment & Plan     Stable since his prior visit. No obvious nutrient deficiencies.  -monitor cbc during treatment  -continue tube feeds           Secondary malignant neoplasm of cervical lymph node    Overview     See sq.c.c. tongue              Endocrine    Protein-calorie malnutrition    Current Assessment & Plan     Patient is tube feed dependent. Weight stable.  -continue monitoring weight  -continue tube feeds             Other Visit Diagnoses     Chemotherapy-induced nausea        Relevant Medications    ondansetron (ZOFRAN-ODT) 8 MG TbDL    Chronic obstructive pulmonary disease with hypoxia        Relevant Orders    X-Ray Chest PA And Lateral    B-TYPE NATRIURETIC PEPTIDE    Gastrostomy present              Orders Placed This Encounter   Procedures    X-Ray Chest PA And Lateral    B-TYPE NATRIURETIC PEPTIDE     Route Chart for Scheduling    Med Onc Chart Routing  Urgent    Follow up with physician 3 weeks. 4/5 in the afternoon, ok to book during hospital service   Follow up with HEIKE    Labs CMP, CBC and magnesium   Lab interval: once a  week  Already scheduled   Imaging Other   Chest xray this week   Pharmacy appointment No pharmacy appointment needed      Other referrals No additional referrals needed         Treatment Plan Information   OP HEAD NECK CISPLATIN WEEKLY + RADIOTHERAPY   Christopher Jay MD   Upcoming Treatment Dates - OP HEAD NECK CISPLATIN WEEKLY + RADIOTHERAPY    3/16/2022       Chemotherapy       CISplatin (PLATINOL) 40 mg/m2 = 69 mg in sodium chloride 0.9% 500 mL chemo infusion       Pre-Hydration       sodium chloride 0.9% 1,000 mL with magnesium sulfate 1 g infusion       Post-Hydration       sodium chloride 0.9% bolus 1,000 mL  3/23/2022       Chemotherapy       CISplatin (PLATINOL) 40 mg/m2 = 69 mg in sodium chloride 0.9% 500 mL chemo infusion       Pre-Hydration       sodium chloride 0.9% 1,000 mL with magnesium sulfate 1 g, potassium chloride 20 mEq infusion       Post-Hydration       sodium chloride 0.9% bolus 1,000 mL  3/30/2022       Chemotherapy       CISplatin (PLATINOL) 40 mg/m2 = 69 mg in sodium chloride 0.9% 500 mL chemo infusion       Pre-Hydration       sodium chloride 0.9% 1,000 mL with magnesium sulfate 1 g, potassium chloride 20 mEq infusion       Post-Hydration       sodium chloride 0.9% bolus 1,000 mL  4/6/2022       Chemotherapy       CISplatin (PLATINOL) 40 mg/m2 = 69 mg in sodium chloride 0.9% 500 mL chemo infusion       Pre-Hydration       sodium chloride 0.9% 1,000 mL with magnesium sulfate 1 g, potassium chloride 20 mEq infusion       Post-Hydration       sodium chloride 0.9% bolus 1,000 mL    Supportive Plan Information  IV FLUIDS AND ELECTROLYTES   Christopher Jay MD   Upcoming Treatment Dates - IV FLUIDS AND ELECTROLYTES    No upcoming days in selected categories.          Christopher Jay MD  Hematology Oncology

## 2022-03-15 PROCEDURE — 77014 PR  CT GUIDANCE PLACEMENT RAD THERAPY FIELDS: ICD-10-PCS | Mod: 26,,, | Performed by: STUDENT IN AN ORGANIZED HEALTH CARE EDUCATION/TRAINING PROGRAM

## 2022-03-15 PROCEDURE — 77386 HC IMRT, COMPLEX: CPT | Performed by: STUDENT IN AN ORGANIZED HEALTH CARE EDUCATION/TRAINING PROGRAM

## 2022-03-15 PROCEDURE — 77014 HC CT GUIDANCE RADIATION THERAPY FLDS PLACEMENT: CPT | Mod: TC | Performed by: STUDENT IN AN ORGANIZED HEALTH CARE EDUCATION/TRAINING PROGRAM

## 2022-03-15 PROCEDURE — 77014 PR  CT GUIDANCE PLACEMENT RAD THERAPY FIELDS: CPT | Mod: 26,,, | Performed by: STUDENT IN AN ORGANIZED HEALTH CARE EDUCATION/TRAINING PROGRAM

## 2022-03-16 ENCOUNTER — INFUSION (OUTPATIENT)
Dept: INFUSION THERAPY | Facility: HOSPITAL | Age: 72
End: 2022-03-16
Payer: MEDICARE

## 2022-03-16 VITALS
BODY MASS INDEX: 20.72 KG/M2 | SYSTOLIC BLOOD PRESSURE: 120 MMHG | OXYGEN SATURATION: 100 % | DIASTOLIC BLOOD PRESSURE: 56 MMHG | WEIGHT: 136.69 LBS | TEMPERATURE: 98 F | HEART RATE: 68 BPM | RESPIRATION RATE: 18 BRPM | HEIGHT: 68 IN

## 2022-03-16 DIAGNOSIS — C02.9 SQUAMOUS CELL CANCER OF TONGUE: ICD-10-CM

## 2022-03-16 DIAGNOSIS — C77.0 SECONDARY MALIGNANT NEOPLASM OF CERVICAL LYMPH NODE: Primary | ICD-10-CM

## 2022-03-16 PROCEDURE — 96375 TX/PRO/DX INJ NEW DRUG ADDON: CPT

## 2022-03-16 PROCEDURE — 96360 HYDRATION IV INFUSION INIT: CPT

## 2022-03-16 PROCEDURE — 96367 TX/PROPH/DG ADDL SEQ IV INF: CPT

## 2022-03-16 PROCEDURE — 77386 HC IMRT, COMPLEX: CPT | Performed by: STUDENT IN AN ORGANIZED HEALTH CARE EDUCATION/TRAINING PROGRAM

## 2022-03-16 PROCEDURE — 96413 CHEMO IV INFUSION 1 HR: CPT

## 2022-03-16 PROCEDURE — 77014 PR  CT GUIDANCE PLACEMENT RAD THERAPY FIELDS: ICD-10-PCS | Mod: 26,,, | Performed by: STUDENT IN AN ORGANIZED HEALTH CARE EDUCATION/TRAINING PROGRAM

## 2022-03-16 PROCEDURE — 77014 HC CT GUIDANCE RADIATION THERAPY FLDS PLACEMENT: CPT | Mod: TC | Performed by: STUDENT IN AN ORGANIZED HEALTH CARE EDUCATION/TRAINING PROGRAM

## 2022-03-16 PROCEDURE — 63600175 PHARM REV CODE 636 W HCPCS: Performed by: STUDENT IN AN ORGANIZED HEALTH CARE EDUCATION/TRAINING PROGRAM

## 2022-03-16 PROCEDURE — 25000003 PHARM REV CODE 250: Performed by: STUDENT IN AN ORGANIZED HEALTH CARE EDUCATION/TRAINING PROGRAM

## 2022-03-16 PROCEDURE — 96361 HYDRATE IV INFUSION ADD-ON: CPT

## 2022-03-16 PROCEDURE — 77014 PR  CT GUIDANCE PLACEMENT RAD THERAPY FIELDS: CPT | Mod: 26,,, | Performed by: STUDENT IN AN ORGANIZED HEALTH CARE EDUCATION/TRAINING PROGRAM

## 2022-03-16 PROCEDURE — 77336 RADIATION PHYSICS CONSULT: CPT | Performed by: STUDENT IN AN ORGANIZED HEALTH CARE EDUCATION/TRAINING PROGRAM

## 2022-03-16 RX ORDER — HEPARIN 100 UNIT/ML
500 SYRINGE INTRAVENOUS
Status: DISCONTINUED | OUTPATIENT
Start: 2022-03-16 | End: 2022-03-16 | Stop reason: HOSPADM

## 2022-03-16 RX ORDER — SODIUM CHLORIDE 0.9 % (FLUSH) 0.9 %
10 SYRINGE (ML) INJECTION
Status: DISCONTINUED | OUTPATIENT
Start: 2022-03-16 | End: 2022-03-16 | Stop reason: HOSPADM

## 2022-03-16 RX ADMIN — MAGNESIUM SULFATE HEPTAHYDRATE 500 ML/HR: 500 INJECTION, SOLUTION INTRAMUSCULAR; INTRAVENOUS at 07:03

## 2022-03-16 RX ADMIN — APREPITANT 130 MG: 130 INJECTION, EMULSION INTRAVENOUS at 09:03

## 2022-03-16 RX ADMIN — CISPLATIN 69 MG: 1 INJECTION INTRAVENOUS at 10:03

## 2022-03-16 RX ADMIN — PALONOSETRON HYDROCHLORIDE 0.25 MG: 0.25 INJECTION, SOLUTION INTRAVENOUS at 09:03

## 2022-03-16 RX ADMIN — SODIUM CHLORIDE 1000 ML: 0.9 INJECTION, SOLUTION INTRAVENOUS at 11:03

## 2022-03-16 NOTE — PLAN OF CARE
Pt received Cisplatin & IVFs today and tolerated well, without complications. VSS throughout infusion. Educated patient about Cisplatin & IVFs (indications, side effects, possible reactions, chemotherapy precautions) and verbalized understanding.  PIV positive for blood return, saline locked and removed prior to DC, catheter tip intact. Pt DC with no distress noted, WC off unit via family, w/o event, pleased. To radiation.

## 2022-03-17 ENCOUNTER — HOSPITAL ENCOUNTER (OUTPATIENT)
Dept: RADIOLOGY | Facility: HOSPITAL | Age: 72
Discharge: HOME | End: 2022-03-17
Attending: STUDENT IN AN ORGANIZED HEALTH CARE EDUCATION/TRAINING PROGRAM
Payer: MEDICARE

## 2022-03-17 DIAGNOSIS — J44.9 CHRONIC OBSTRUCTIVE PULMONARY DISEASE WITH HYPOXIA: ICD-10-CM

## 2022-03-17 PROCEDURE — 71046 XR CHEST PA AND LATERAL: ICD-10-PCS | Mod: 26,,, | Performed by: RADIOLOGY

## 2022-03-17 PROCEDURE — 77014 PR  CT GUIDANCE PLACEMENT RAD THERAPY FIELDS: CPT | Mod: 26,,, | Performed by: STUDENT IN AN ORGANIZED HEALTH CARE EDUCATION/TRAINING PROGRAM

## 2022-03-17 PROCEDURE — 77014 HC CT GUIDANCE RADIATION THERAPY FLDS PLACEMENT: CPT | Mod: TC | Performed by: STUDENT IN AN ORGANIZED HEALTH CARE EDUCATION/TRAINING PROGRAM

## 2022-03-17 PROCEDURE — 77386 HC IMRT, COMPLEX: CPT | Performed by: STUDENT IN AN ORGANIZED HEALTH CARE EDUCATION/TRAINING PROGRAM

## 2022-03-17 PROCEDURE — 71046 X-RAY EXAM CHEST 2 VIEWS: CPT | Mod: TC,FY,PO

## 2022-03-17 PROCEDURE — 71046 X-RAY EXAM CHEST 2 VIEWS: CPT | Mod: 26,,, | Performed by: RADIOLOGY

## 2022-03-17 PROCEDURE — 77014 PR  CT GUIDANCE PLACEMENT RAD THERAPY FIELDS: ICD-10-PCS | Mod: 26,,, | Performed by: STUDENT IN AN ORGANIZED HEALTH CARE EDUCATION/TRAINING PROGRAM

## 2022-03-18 PROCEDURE — 77386 HC IMRT, COMPLEX: CPT | Performed by: STUDENT IN AN ORGANIZED HEALTH CARE EDUCATION/TRAINING PROGRAM

## 2022-03-18 PROCEDURE — 77014 PR  CT GUIDANCE PLACEMENT RAD THERAPY FIELDS: ICD-10-PCS | Mod: 26,,, | Performed by: STUDENT IN AN ORGANIZED HEALTH CARE EDUCATION/TRAINING PROGRAM

## 2022-03-18 PROCEDURE — 77014 PR  CT GUIDANCE PLACEMENT RAD THERAPY FIELDS: CPT | Mod: 26,,, | Performed by: STUDENT IN AN ORGANIZED HEALTH CARE EDUCATION/TRAINING PROGRAM

## 2022-03-18 PROCEDURE — 77014 HC CT GUIDANCE RADIATION THERAPY FLDS PLACEMENT: CPT | Mod: TC | Performed by: STUDENT IN AN ORGANIZED HEALTH CARE EDUCATION/TRAINING PROGRAM

## 2022-03-21 ENCOUNTER — OFFICE VISIT (OUTPATIENT)
Dept: HEMATOLOGY/ONCOLOGY | Facility: CLINIC | Age: 72
End: 2022-03-21
Payer: MEDICARE

## 2022-03-21 ENCOUNTER — LAB VISIT (OUTPATIENT)
Dept: LAB | Facility: HOSPITAL | Age: 72
End: 2022-03-21
Attending: STUDENT IN AN ORGANIZED HEALTH CARE EDUCATION/TRAINING PROGRAM
Payer: MEDICARE

## 2022-03-21 ENCOUNTER — DOCUMENTATION ONLY (OUTPATIENT)
Dept: RADIATION ONCOLOGY | Facility: CLINIC | Age: 72
End: 2022-03-21
Payer: MEDICARE

## 2022-03-21 VITALS
HEART RATE: 70 BPM | RESPIRATION RATE: 20 BRPM | DIASTOLIC BLOOD PRESSURE: 55 MMHG | SYSTOLIC BLOOD PRESSURE: 97 MMHG | BODY MASS INDEX: 20.78 KG/M2 | HEIGHT: 68 IN | OXYGEN SATURATION: 94 %

## 2022-03-21 DIAGNOSIS — E87.5 HYPERKALEMIA: ICD-10-CM

## 2022-03-21 DIAGNOSIS — D64.9 NORMOCYTIC ANEMIA: ICD-10-CM

## 2022-03-21 DIAGNOSIS — J96.11 CHRONIC RESPIRATORY FAILURE WITH HYPOXIA, ON HOME O2 THERAPY: ICD-10-CM

## 2022-03-21 DIAGNOSIS — C02.9 SQUAMOUS CELL CANCER OF TONGUE: Primary | ICD-10-CM

## 2022-03-21 DIAGNOSIS — R68.89 EXCESSIVE ORAL SECRETIONS: ICD-10-CM

## 2022-03-21 DIAGNOSIS — C02.9 SQUAMOUS CELL CANCER OF TONGUE: ICD-10-CM

## 2022-03-21 DIAGNOSIS — J44.9 CHRONIC OBSTRUCTIVE PULMONARY DISEASE WITH HYPOXIA: ICD-10-CM

## 2022-03-21 DIAGNOSIS — K12.33 ORAL MUCOSITIS DUE TO RADIATION: ICD-10-CM

## 2022-03-21 DIAGNOSIS — Z99.81 CHRONIC RESPIRATORY FAILURE WITH HYPOXIA, ON HOME O2 THERAPY: ICD-10-CM

## 2022-03-21 DIAGNOSIS — Z79.899 IMMUNODEFICIENCY DUE TO DRUG THERAPY: ICD-10-CM

## 2022-03-21 DIAGNOSIS — D84.821 IMMUNODEFICIENCY DUE TO DRUG THERAPY: ICD-10-CM

## 2022-03-21 DIAGNOSIS — C77.0 SECONDARY MALIGNANT NEOPLASM OF CERVICAL LYMPH NODE: ICD-10-CM

## 2022-03-21 LAB
ALBUMIN SERPL BCP-MCNC: 3.4 G/DL (ref 3.5–5.2)
ALP SERPL-CCNC: 106 U/L (ref 55–135)
ALT SERPL W/O P-5'-P-CCNC: 22 U/L (ref 10–44)
ANION GAP SERPL CALC-SCNC: 9 MMOL/L (ref 8–16)
AST SERPL-CCNC: 16 U/L (ref 10–40)
BASOPHILS # BLD AUTO: 0.02 K/UL (ref 0–0.2)
BASOPHILS NFR BLD: 0.3 % (ref 0–1.9)
BILIRUB SERPL-MCNC: 0.2 MG/DL (ref 0.1–1)
BNP SERPL-MCNC: 242 PG/ML (ref 0–99)
BUN SERPL-MCNC: 28 MG/DL (ref 8–23)
CALCIUM SERPL-MCNC: 9.3 MG/DL (ref 8.7–10.5)
CHLORIDE SERPL-SCNC: 93 MMOL/L (ref 95–110)
CO2 SERPL-SCNC: 35 MMOL/L (ref 23–29)
CREAT SERPL-MCNC: 0.7 MG/DL (ref 0.5–1.4)
DIFFERENTIAL METHOD: ABNORMAL
EOSINOPHIL # BLD AUTO: 0.1 K/UL (ref 0–0.5)
EOSINOPHIL NFR BLD: 1.6 % (ref 0–8)
ERYTHROCYTE [DISTWIDTH] IN BLOOD BY AUTOMATED COUNT: 15.1 % (ref 11.5–14.5)
EST. GFR  (AFRICAN AMERICAN): >60 ML/MIN/1.73 M^2
EST. GFR  (NON AFRICAN AMERICAN): >60 ML/MIN/1.73 M^2
GLUCOSE SERPL-MCNC: 142 MG/DL (ref 70–110)
HCT VFR BLD AUTO: 32.4 % (ref 40–54)
HGB BLD-MCNC: 9.6 G/DL (ref 14–18)
IMM GRANULOCYTES # BLD AUTO: 0.13 K/UL (ref 0–0.04)
IMM GRANULOCYTES NFR BLD AUTO: 1.7 % (ref 0–0.5)
LYMPHOCYTES # BLD AUTO: 0.3 K/UL (ref 1–4.8)
LYMPHOCYTES NFR BLD: 3.9 % (ref 18–48)
MAGNESIUM SERPL-MCNC: 2.2 MG/DL (ref 1.6–2.6)
MCH RBC QN AUTO: 28.5 PG (ref 27–31)
MCHC RBC AUTO-ENTMCNC: 29.6 G/DL (ref 32–36)
MCV RBC AUTO: 96 FL (ref 82–98)
MONOCYTES # BLD AUTO: 0.6 K/UL (ref 0.3–1)
MONOCYTES NFR BLD: 8.5 % (ref 4–15)
NEUTROPHILS # BLD AUTO: 6.3 K/UL (ref 1.8–7.7)
NEUTROPHILS NFR BLD: 84 % (ref 38–73)
NRBC BLD-RTO: 0 /100 WBC
PLATELET # BLD AUTO: 249 K/UL (ref 150–450)
PMV BLD AUTO: 9.5 FL (ref 9.2–12.9)
POTASSIUM SERPL-SCNC: 4.5 MMOL/L (ref 3.5–5.1)
PROT SERPL-MCNC: 6.6 G/DL (ref 6–8.4)
RBC # BLD AUTO: 3.37 M/UL (ref 4.6–6.2)
SODIUM SERPL-SCNC: 137 MMOL/L (ref 136–145)
WBC # BLD AUTO: 7.51 K/UL (ref 3.9–12.7)

## 2022-03-21 PROCEDURE — 3074F SYST BP LT 130 MM HG: CPT | Mod: CPTII,S$GLB,, | Performed by: STUDENT IN AN ORGANIZED HEALTH CARE EDUCATION/TRAINING PROGRAM

## 2022-03-21 PROCEDURE — 3008F PR BODY MASS INDEX (BMI) DOCUMENTED: ICD-10-PCS | Mod: CPTII,S$GLB,, | Performed by: STUDENT IN AN ORGANIZED HEALTH CARE EDUCATION/TRAINING PROGRAM

## 2022-03-21 PROCEDURE — 3074F PR MOST RECENT SYSTOLIC BLOOD PRESSURE < 130 MM HG: ICD-10-PCS | Mod: CPTII,S$GLB,, | Performed by: STUDENT IN AN ORGANIZED HEALTH CARE EDUCATION/TRAINING PROGRAM

## 2022-03-21 PROCEDURE — 85025 COMPLETE CBC W/AUTO DIFF WBC: CPT | Performed by: STUDENT IN AN ORGANIZED HEALTH CARE EDUCATION/TRAINING PROGRAM

## 2022-03-21 PROCEDURE — 83880 ASSAY OF NATRIURETIC PEPTIDE: CPT | Performed by: STUDENT IN AN ORGANIZED HEALTH CARE EDUCATION/TRAINING PROGRAM

## 2022-03-21 PROCEDURE — 1125F PR PAIN SEVERITY QUANTIFIED, PAIN PRESENT: ICD-10-PCS | Mod: CPTII,S$GLB,, | Performed by: STUDENT IN AN ORGANIZED HEALTH CARE EDUCATION/TRAINING PROGRAM

## 2022-03-21 PROCEDURE — 77014 PR  CT GUIDANCE PLACEMENT RAD THERAPY FIELDS: ICD-10-PCS | Mod: 26,,, | Performed by: STUDENT IN AN ORGANIZED HEALTH CARE EDUCATION/TRAINING PROGRAM

## 2022-03-21 PROCEDURE — 3078F DIAST BP <80 MM HG: CPT | Mod: CPTII,S$GLB,, | Performed by: STUDENT IN AN ORGANIZED HEALTH CARE EDUCATION/TRAINING PROGRAM

## 2022-03-21 PROCEDURE — 99999 PR PBB SHADOW E&M-EST. PATIENT-LVL III: CPT | Mod: PBBFAC,,, | Performed by: STUDENT IN AN ORGANIZED HEALTH CARE EDUCATION/TRAINING PROGRAM

## 2022-03-21 PROCEDURE — 77386 HC IMRT, COMPLEX: CPT | Performed by: STUDENT IN AN ORGANIZED HEALTH CARE EDUCATION/TRAINING PROGRAM

## 2022-03-21 PROCEDURE — 1125F AMNT PAIN NOTED PAIN PRSNT: CPT | Mod: CPTII,S$GLB,, | Performed by: STUDENT IN AN ORGANIZED HEALTH CARE EDUCATION/TRAINING PROGRAM

## 2022-03-21 PROCEDURE — 3008F BODY MASS INDEX DOCD: CPT | Mod: CPTII,S$GLB,, | Performed by: STUDENT IN AN ORGANIZED HEALTH CARE EDUCATION/TRAINING PROGRAM

## 2022-03-21 PROCEDURE — 80053 COMPREHEN METABOLIC PANEL: CPT | Performed by: STUDENT IN AN ORGANIZED HEALTH CARE EDUCATION/TRAINING PROGRAM

## 2022-03-21 PROCEDURE — 1101F PT FALLS ASSESS-DOCD LE1/YR: CPT | Mod: CPTII,S$GLB,, | Performed by: STUDENT IN AN ORGANIZED HEALTH CARE EDUCATION/TRAINING PROGRAM

## 2022-03-21 PROCEDURE — 77014 HC CT GUIDANCE RADIATION THERAPY FLDS PLACEMENT: CPT | Mod: TC | Performed by: STUDENT IN AN ORGANIZED HEALTH CARE EDUCATION/TRAINING PROGRAM

## 2022-03-21 PROCEDURE — 99215 PR OFFICE/OUTPT VISIT, EST, LEVL V, 40-54 MIN: ICD-10-PCS | Mod: S$GLB,,, | Performed by: STUDENT IN AN ORGANIZED HEALTH CARE EDUCATION/TRAINING PROGRAM

## 2022-03-21 PROCEDURE — 4010F ACE/ARB THERAPY RXD/TAKEN: CPT | Mod: CPTII,S$GLB,, | Performed by: STUDENT IN AN ORGANIZED HEALTH CARE EDUCATION/TRAINING PROGRAM

## 2022-03-21 PROCEDURE — 83735 ASSAY OF MAGNESIUM: CPT | Performed by: STUDENT IN AN ORGANIZED HEALTH CARE EDUCATION/TRAINING PROGRAM

## 2022-03-21 PROCEDURE — 1101F PR PT FALLS ASSESS DOC 0-1 FALLS W/OUT INJ PAST YR: ICD-10-PCS | Mod: CPTII,S$GLB,, | Performed by: STUDENT IN AN ORGANIZED HEALTH CARE EDUCATION/TRAINING PROGRAM

## 2022-03-21 PROCEDURE — 77014 PR  CT GUIDANCE PLACEMENT RAD THERAPY FIELDS: CPT | Mod: 26,,, | Performed by: STUDENT IN AN ORGANIZED HEALTH CARE EDUCATION/TRAINING PROGRAM

## 2022-03-21 PROCEDURE — 3288F PR FALLS RISK ASSESSMENT DOCUMENTED: ICD-10-PCS | Mod: CPTII,S$GLB,, | Performed by: STUDENT IN AN ORGANIZED HEALTH CARE EDUCATION/TRAINING PROGRAM

## 2022-03-21 PROCEDURE — 4010F PR ACE/ARB THEARPY RXD/TAKEN: ICD-10-PCS | Mod: CPTII,S$GLB,, | Performed by: STUDENT IN AN ORGANIZED HEALTH CARE EDUCATION/TRAINING PROGRAM

## 2022-03-21 PROCEDURE — 99999 PR PBB SHADOW E&M-EST. PATIENT-LVL III: ICD-10-PCS | Mod: PBBFAC,,, | Performed by: STUDENT IN AN ORGANIZED HEALTH CARE EDUCATION/TRAINING PROGRAM

## 2022-03-21 PROCEDURE — 3288F FALL RISK ASSESSMENT DOCD: CPT | Mod: CPTII,S$GLB,, | Performed by: STUDENT IN AN ORGANIZED HEALTH CARE EDUCATION/TRAINING PROGRAM

## 2022-03-21 PROCEDURE — 3078F PR MOST RECENT DIASTOLIC BLOOD PRESSURE < 80 MM HG: ICD-10-PCS | Mod: CPTII,S$GLB,, | Performed by: STUDENT IN AN ORGANIZED HEALTH CARE EDUCATION/TRAINING PROGRAM

## 2022-03-21 PROCEDURE — 99215 OFFICE O/P EST HI 40 MIN: CPT | Mod: S$GLB,,, | Performed by: STUDENT IN AN ORGANIZED HEALTH CARE EDUCATION/TRAINING PROGRAM

## 2022-03-21 PROCEDURE — 36415 COLL VENOUS BLD VENIPUNCTURE: CPT | Performed by: STUDENT IN AN ORGANIZED HEALTH CARE EDUCATION/TRAINING PROGRAM

## 2022-03-21 RX ORDER — HEPARIN 100 UNIT/ML
500 SYRINGE INTRAVENOUS
Status: CANCELLED | OUTPATIENT
Start: 2022-03-23

## 2022-03-21 RX ORDER — SODIUM CHLORIDE 0.9 % (FLUSH) 0.9 %
10 SYRINGE (ML) INJECTION
Status: CANCELLED | OUTPATIENT
Start: 2022-03-23

## 2022-03-21 RX ORDER — GLYCOPYRROLATE 1 MG/5ML
1 SOLUTION ORAL 3 TIMES DAILY PRN
Qty: 473 ML | Refills: 1 | Status: SHIPPED | OUTPATIENT
Start: 2022-03-21 | End: 2022-03-23 | Stop reason: SDUPTHER

## 2022-03-21 NOTE — ASSESSMENT & PLAN NOTE
Quick DC. Duplicate Request  Refill Authorization Note     is requesting a refill authorization.    Brief assessment and rationale for refill: Quick DC: duplicate request   Name and strength of medication: lisinopril-hydrochlorothiazide (PRINZIDE,ZESTORETIC) 20-12.5 mg per tablet // valACYclovir (VALTREX) 500 MG tablet       Medication Therapy Plan: called pharmacy (see note) quick dc duplicate request     Medication reconciliation completed: No                         Comments:   Last Prescribed Info:      Ordering User:  CATRACHITO Vasquez MD            Diagnosis Association: Essential hypertension (I10)      Original Order:  lisinopril-hydrochlorothiazide (PRINZIDE,ZESTORETIC) 20-12.5 mg per tablet [[id number]]      Pharmacy:  [facility] [address] [place] [address] PADDY #:  --     Pharmacy Comments:  --          Fill quantity remaining:  -- Fill quantity used:  -- Next fill due: --       Outpatient Medication Detail      Disp Refills Start End    lisinopril-hydrochlorothiazide (PRINZIDE,ZESTORETIC) 20-12.5 mg per tablet 90 tablet 0 12/22/2019     Sig - Route: TAKE 1 TABLET BY MOUTH ONCE DAILY - Oral    Sent to pharmacy as: lisinopril-hydrochlorothiazide (PRINZIDE,ZESTORETIC) 20-12.5 mg per tablet    E-Prescribing Status: Receipt confirmed by pharmacy (12/22/2019  7:29 PM CST)    Ordering Encounter Report     Associated Reports   View Encounter       Ordering User:  CATRACHITO Vasquez MD               Original Order:  valACYclovir (VALTREX) 500 MG tablet [[id number]]      Pharmacy:  [facility] [address] [place] [address] PADDY #:  --     Pharmacy Comments:  --          Fill quantity remaining:  -- Fill quantity used:  -- Next fill due: --       Outpatient Medication Detail      Disp Refills Start End    valACYclovir (VALTREX) 500 MG tablet 90 tablet 0 12/22/2019     Sig: TAKE 1 CAPLET BY MOUTH ONCE DAILY    Sent to pharmacy as: valACYclovir (VALTREX) 500 MG  Stage IVB squamous cell carcinoma of the tongue with contralateral cervical lymph node involvement, +margins, +SALINAS (microscopic).  S/p peg tube placement.    -labs reviewed; ok to proceed with cisplatin.  -follow up with me in 2 weeks  -support medications:  zofran ODT prn nausea. Glycopyrrolate prn secretions. Magic mouthwash for mucositis from chemoRT   tablet    E-Prescribing Status: Receipt confirmed by pharmacy (12/22/2019  7:29 PM CST)    Ordering Encounter Report     Associated Reports   View Encounter

## 2022-03-21 NOTE — ASSESSMENT & PLAN NOTE
CXR negative, BNP only mildly elevated without obvious fluid overload on exam or on film. Increased O2 dependence likely related to excess secretions  -trial of glycopyrrolate  --if does not work, consider scopolamine prn next

## 2022-03-21 NOTE — PROGRESS NOTES
PATIENT: Fareed Richard Jr.  MRN: [id number]  DATE: 3/21/2022      Diagnosis:   1. Squamous cell cancer of tongue    2. Secondary malignant neoplasm of cervical lymph node    3. Excessive oral secretions    4. Normocytic anemia    5. Immunodeficiency due to drug therapy    6. Chronic respiratory failure with hypoxia, on home O2 therapy    7. Hyperkalemia        Chief Complaint: Squamous cell cancer of tongue      Oncologic History:    Oncologic History 1. Squamous cell carcinoma of tongue    Oncologic Treatment 1.  1/4/22 total glossectomy, bilateral neck dissection, bilateral cervical facial advancement flaps and anterolateral thigh free flap reconstruction of his glossectomy defect   2. 2/28/22 adjuvant chemoradiation with cisplatin    Pathology Squamous cell carcinoma, poorly differentiated, p16 negative  Final pathology: pT4a:  Moderately advanced local disease.  Tumor size = 6.0 cm with DOI = 29 mm and invades adjacent structures.   pN3b:  Metastasis in single contralateral node, SALINAS(+).   pM:  Not applicable.   Superior soft tissue margin of the left neck is involved by tumor           Subjective:    Interval History: Mr. Richard is here for follow up    Initially saw Aletha Cook NP (ENT) 11/29/21 regarding non-healing left sided tongue lesion for about 6 weeks.  Associated symptoms included tongue pain, bleeding, left otalgia, weight loss from difficulty eating, and bump under left chin that enlarged and was refractory to oral antibiotics and nystatin.   History significant for skin cancer of the left cheek and right lower lip about 5-6 years ago. He reported he had a flap done with Dr. Starks. His wife notes that one of the cancers was SCC and the other was basal cell but she cannot call which was which. 12/6/21 CT neck was done (findings below). 12/15/21 he saw Dr. Smalls who performed an FNA of his left neck mass, which finalized as squamous cell carcinoma, p16 negative.   12/16/21 CT chest without contrast  was done (findings below).  1/4/22 underwent total glossectomy, bilateral neck dissection, bilateral cervical facial advancement flaps and anterolateral thigh free flap reconstruction of his glossectomy defect.  Final pathology [id number], +SALINAS (microscopic), +margin (superior soft tissue margin of left neck).    He has completed 3 weeks of chemoradiation. New symptoms include pain around the area of the RT mask (chin area) and sores in his lips.  Still struggles with secretions but no worsening dyspnea.  Still on continuous O2.  No new issues with chemotherapy so far.  He has a peg tube.  He denies any issues getting peripheral IVs or labs.  Tube feed dependent; all nutrition is via peg tube. S/p glossectomy.    Former smoker, 110 pack years.    Past Medical History:   Past Medical History:   Diagnosis Date    Cancer     skin to Rt forearm and face    COPD (chronic obstructive pulmonary disease)     Hyperlipidemia     Hypertension     Pseudoaneurysm        Past Surgical HIstory:   Past Surgical History:   Procedure Laterality Date    ABDOMINAL SURGERY      stents placed in liver and large intestines, per patient    CAROTID STENT      CORONARY STENT PLACEMENT  01/2000    DISSECTION OF NECK Bilateral 1/4/2022    Procedure: DISSECTION, NECK;  Surgeon: Naeem Smalls MD;  Location: [facility] OR [address] [place] [address];  Service: ENT;  Laterality: Bilateral;    ESOPHAGOGASTRODUODENOSCOPY W/ PEG N/A 1/4/2022    Procedure: EGD, WITH PEG TUBE INSERTION;  Surgeon: Anthony Calabrese MD;  Location: [address];  Service: General;  Laterality: N/A;    EYE SURGERY      Cataract bilateral    femoral stents      bilateral    FLAP PROCEDURE N/A 1/3/2022    Procedure: CREATION, FREE FLAP;  Surgeon: Naeem Smalls MD;  Location: [address];  Service: ENT;  Laterality: N/A;    FLAP PROCEDURE Right 1/4/2022    Procedure: CREATION, FREE FLAP;  Surgeon: Stacey Conde MD;  Location: [facility] OR [address] [place] [address];  Service: ENT;   Laterality: Right;  Ischemic start 1203  Ischemic stop 1353    GLOSSECTOMY N/A 1/4/2022    Procedure: GLOSSECTOMY;  Surgeon: Naeem Smalls MD;  Location: [facility] OR [address] [place] [address];  Service: ENT;  Laterality: N/A;    RADICAL NECK DISSECTION Left 1/3/2022    Procedure: DISSECTION, NECK, RADICAL;  Surgeon: Naeem Smalls MD;  [facility];  Service: ENT;  Laterality: Left;    SKIN CANCER EXCISION      TRACHEOTOMY N/A 1/4/2022    Procedure: TRACHEOTOMY;  Surgeon: Naeem Smalls MD;  Location: [facility] OR [address] [place] [address];  Service: ENT;  Laterality: N/A;       Family History: No family history on file.    Social History:  reports that he quit smoking about 3 years ago. His smoking use included cigarettes. He started smoking about 58 years ago. He has a 80.00 pack-year smoking history. He has never used smokeless tobacco. He reports current alcohol use of about 3.0 standard drinks of alcohol per week. He reports that he does not use drugs.    Allergies:  Review of patient's allergies indicates:  No Known Allergies    Medications:  Current Outpatient Medications   Medication Sig Dispense Refill    acetaminophen (TYLENOL) 325 MG tablet 2 tablets (650 mg total) by Per G Tube route every 6 (six) hours.  0    albuterol-ipratropium (DUO-NEB) 2.5 mg-0.5 mg/3 mL nebulizer solution Take 3 mLs by nebulization every 4 (four) hours as needed for Wheezing. Rescue 75 mL 0    amLODIPine (NORVASC) 10 MG tablet Take 10 mg by mouth once daily.      aspirin 81 MG Chew 1 tablet (81 mg total) by Per G Tube route once daily.  0    atorvastatin (LIPITOR) 20 MG tablet 1 tablet (20 mg total) by Per G Tube route once daily. 90 tablet 3    bisacodyL (DULCOLAX) 10 mg Supp Place 1 suppository (10 mg total) rectally daily as needed.  0    clopidogreL (PLAVIX) 75 mg tablet 1 tablet (75 mg total) by Per G Tube route once daily. 30 tablet 11    folic acid (FOLVITE) 1 MG tablet 1 tablet (1 mg total) by Per G Tube route once daily.   0    guaiFENesin 200 mg/5 mL Liqd Take 400 mg by mouth every 4 (four) hours as needed (for secretions). 473 mL 3    losartan (COZAAR) 50 MG tablet 1 tablet (50 mg total) by Per G Tube route once daily. 90 tablet 3    metoprolol tartrate (LOPRESSOR) 100 MG tablet 1 tablet (100 mg total) by Per G Tube route 2 (two) times daily. 60 tablet 11    ondansetron (ZOFRAN-ODT) 8 MG TbDL Take 1 tablet (8 mg total) by mouth every 8 (eight) hours as needed (nausea). 30 tablet 1    oxyCODONE (ROXICODONE) 5 mg/5 mL Soln 5 mLs (5 mg total) by Per G Tube route every 4 (four) hours as needed.  0    sodium chloride 3% 3 % nebulizer solution Take 4 mLs by nebulization every 8 (eight) hours.      duke's soln (benadryl 30 mL, mylanta 30 mL, LIDOcaine 30 mL, nystatin 30 mL) 120mL Take 10 mLs by mouth 4 (four) times daily as needed (mucositis). 360 mL 0    enoxaparin (LOVENOX) 40 mg/0.4 mL Syrg Inject 0.4 mLs (40 mg total) into the skin once daily. (Patient not taking: No sig reported)      glycopyrrolate (CUVPOSA) 1 mg/5 mL (0.2 mg/mL) Soln Take 5 mLs (1 mg total) by mouth 3 (three) times daily as needed (TO REDUCE SECRETIONS). 473 mL 1    melatonin (MELATIN) 3 mg tablet 2 tablets (6 mg total) by Per G Tube route nightly. (Patient not taking: No sig reported)  0    polyethylene glycol (GLYCOLAX) 17 gram PwPk 17 g by Per G Tube route 2 (two) times daily. (Patient not taking: No sig reported)  0    senna-docusate 8.6-50 mg (PERICOLACE) 8.6-50 mg per tablet 1 tablet by Per G Tube route 2 (two) times daily. (Patient not taking: No sig reported)      sodium chloride (OCEAN) 0.65 % nasal spray 1 spray by Nasal route as needed for Congestion. (Patient not taking: No sig reported)  12    thiamine 100 MG tablet 1 tablet (100 mg total) by Per G Tube route once daily. (Patient not taking: No sig reported)       No current facility-administered medications for this visit.       Review of Systems   Constitutional: Negative for activity  "change, appetite change, chills, diaphoresis, fatigue, fever and unexpected weight change.   HENT: Positive for mouth sores, trouble swallowing and voice change. Negative for ear pain and nosebleeds.    Eyes: Negative for visual disturbance.   Respiratory: Positive for cough and shortness of breath. Negative for chest tightness and wheezing.    Cardiovascular: Negative for chest pain and leg swelling.   Gastrointestinal: Negative for abdominal distention, abdominal pain, blood in stool, constipation, diarrhea, nausea and vomiting.   Endocrine: Negative for cold intolerance and heat intolerance.   Genitourinary: Negative for difficulty urinating and dysuria.   Musculoskeletal: Negative for arthralgias and back pain.   Skin: Negative for color change.   Neurological: Positive for weakness. Negative for dizziness, light-headedness, numbness and headaches.   Hematological: Negative for adenopathy. Does not bruise/bleed easily.   Psychiatric/Behavioral: Negative for confusion.       ECOG Performance Status:     ECOG SCORE    2 - Capable of all selfcare but unable to carry out any work activities, active > 50% of hours         Objective:      Vitals:   Vitals:    03/21/22 1519   BP: (!) 97/55   BP Location: Right arm   Patient Position: Sitting   BP Method: Medium (Automatic)   Pulse: 70   Resp: 20   SpO2: (!) 94%   Height: 5' 8" (1.727 m)     BMI: Body mass index is 20.78 kg/m².    Physical Exam  Constitutional:       General: He is not in acute distress.     Appearance: Normal appearance. He is ill-appearing.   HENT:      Head: Normocephalic and atraumatic.      Mouth/Throat:      Pharynx: Posterior oropharyngeal erythema present. No oropharyngeal exudate.   Eyes:      General: No scleral icterus.     Extraocular Movements: Extraocular movements intact.      Conjunctiva/sclera: Conjunctivae normal.      Pupils: Pupils are equal, round, and reactive to light.   Neck:      Comments: +trach  Cardiovascular:      Rate and " Rhythm: Normal rate and regular rhythm.      Heart sounds: No murmur heard.    No friction rub. No gallop.   Pulmonary:      Effort: Pulmonary effort is normal. No respiratory distress.      Breath sounds: No stridor. No wheezing, rhonchi or rales.      Comments: Diminished breath sounds all lung fields  Abdominal:      General: Bowel sounds are normal. There is no distension.      Palpations: Abdomen is soft. There is no mass.      Tenderness: There is no abdominal tenderness. There is no guarding or rebound.      Comments: +peg tube site c/d/i   Musculoskeletal:         General: Normal range of motion.      Cervical back: Normal range of motion and neck supple.      Right lower leg: No edema.      Left lower leg: No edema.   Skin:     General: Skin is warm and dry.      Findings: No erythema.   Neurological:      General: No focal deficit present.      Mental Status: He is alert.      Motor: Weakness present.         Laboratory Data:   Recent Results (from the past 168 hour(s))   CBC Auto Differential    Collection Time: 03/21/22  2:39 PM   Result Value Ref Range    WBC 7.51 3.90 - 12.70 K/uL    RBC 3.37 (L) 4.60 - 6.20 M/uL    Hemoglobin 9.6 (L) 14.0 - 18.0 g/dL    Hematocrit 32.4 (L) 40.0 - 54.0 %    MCV 96 82 - 98 fL    MCH 28.5 27.0 - 31.0 pg    MCHC 29.6 (L) 32.0 - 36.0 g/dL    RDW 15.1 (H) 11.5 - 14.5 %    Platelets 249 150 - 450 K/uL    MPV 9.5 9.2 - 12.9 fL    Immature Granulocytes 1.7 (H) 0.0 - 0.5 %    Gran # (ANC) 6.3 1.8 - 7.7 K/uL    Immature Grans (Abs) 0.13 (H) 0.00 - 0.04 K/uL    Lymph # 0.3 (L) 1.0 - 4.8 K/uL    Mono # 0.6 0.3 - 1.0 K/uL    Eos # 0.1 0.0 - 0.5 K/uL    Baso # 0.02 0.00 - 0.20 K/uL    nRBC 0 0 /100 WBC    Gran % 84.0 (H) 38.0 - 73.0 %    Lymph % 3.9 (L) 18.0 - 48.0 %    Mono % 8.5 4.0 - 15.0 %    Eosinophil % 1.6 0.0 - 8.0 %    Basophil % 0.3 0.0 - 1.9 %    Differential Method Automated    Comprehensive Metabolic Panel    Collection Time: 03/21/22  2:39 PM   Result Value Ref Range     Sodium 137 136 - 145 mmol/L    Potassium 4.5 3.5 - 5.1 mmol/L    Chloride 93 (L) 95 - 110 mmol/L    CO2 35 (H) 23 - 29 mmol/L    Glucose 142 (H) 70 - 110 mg/dL    BUN 28 (H) 8 - 23 mg/dL    Creatinine 0.7 0.5 - 1.4 mg/dL    Calcium 9.3 8.7 - 10.5 mg/dL    Total Protein 6.6 6.0 - 8.4 g/dL    Albumin 3.4 (L) 3.5 - 5.2 g/dL    Total Bilirubin 0.2 0.1 - 1.0 mg/dL    Alkaline Phosphatase 106 55 - 135 U/L    AST 16 10 - 40 U/L    ALT 22 10 - 44 U/L    Anion Gap 9 8 - 16 mmol/L    eGFR if African American >60.0 >60 mL/min/1.73 m^2    eGFR if non African American >60.0 >60 mL/min/1.73 m^2   Magnesium    Collection Time: 03/21/22  2:39 PM   Result Value Ref Range    Magnesium 2.2 1.6 - 2.6 mg/dL   B-TYPE NATRIURETIC PEPTIDE    Collection Time: 03/21/22  2:39 PM   Result Value Ref Range     (H) 0 - 99 pg/mL       Imaging:         Assessment:       1. Squamous cell cancer of tongue    2. Secondary malignant neoplasm of cervical lymph node    3. Excessive oral secretions    4. Normocytic anemia    5. Immunodeficiency due to drug therapy    6. Chronic respiratory failure with hypoxia, on home O2 therapy    7. Hyperkalemia           Plan:       Problem List Items Addressed This Visit        Pulmonary    Chronic respiratory failure with hypoxia, on home O2 therapy    Current Assessment & Plan     CXR negative, BNP only mildly elevated without obvious fluid overload on exam or on film. Increased O2 dependence likely related to excess secretions  -trial of glycopyrrolate  --if does not work, consider scopolamine prn next              Renal/    RESOLVED: Hyperkalemia    Current Assessment & Plan     resolved              Oncology    Squamous cell cancer of tongue - Primary    Overview     12/15/21 FNA left neck mass : squamous cell carcinoma, p16 negative  1/4/22 total glossectomy, bilateral neck dissection, bilateral cervical facial advancement flaps and anterolateral thigh free flap reconstruction of his glossectomy  defect.  Final path :  [id number] (+microscopic SALINAS, single contralateral node), superior soft tissue margin of left neck with tumor.    2/28/22 start chemoradiation           Current Assessment & Plan     Stage IVB squamous cell carcinoma of the tongue with contralateral cervical lymph node involvement, +margins, +SALINAS (microscopic).  S/p peg tube placement.    -labs reviewed; ok to proceed with cisplatin.  -follow up with me in 2 weeks  -support medications:  zofran ODT prn nausea. Glycopyrrolate prn secretions. Magic mouthwash for mucositis from chemoRT           Relevant Medications    glycopyrrolate (CUVPOSA) 1 mg/5 mL (0.2 mg/mL) Soln    Normocytic anemia    Secondary malignant neoplasm of cervical lymph node    Overview     See sq.c.c. tongue              Other    Immunodeficiency due to drug therapy    Overview     Secondary to chemotherapy           Current Assessment & Plan     Afebrile, anc sufficient for treatment  -monitor with cbc             Other Visit Diagnoses     Excessive oral secretions        Relevant Medications    glycopyrrolate (CUVPOSA) 1 mg/5 mL (0.2 mg/mL) Soln          No orders of the defined types were placed in this encounter.    Route Chart for Scheduling    Med Onc Chart Routing      Follow up with physician 2 weeks. 4/5 in the afternoon, ok to book during hospital service   Follow up with HEIKE    Labs CMP, CBC and magnesium   Lab interval: once a week  Already scheduled   Imaging    Pharmacy appointment No pharmacy appointment needed      Other referrals No additional referrals needed         Treatment Plan Information   OP HEAD NECK CISPLATIN WEEKLY + RADIOTHERAPY   Christopher Jay MD   Upcoming Treatment Dates - OP HEAD NECK CISPLATIN WEEKLY + RADIOTHERAPY    3/23/2022       Chemotherapy       CISplatin (PLATINOL) 40 mg/m2 = 69 mg in sodium chloride 0.9% 500 mL chemo infusion       Pre-Hydration       sodium chloride 0.9% 1,000 mL with magnesium sulfate 1 g infusion        Post-Hydration       sodium chloride 0.9% bolus 1,000 mL  3/30/2022       Chemotherapy       CISplatin (PLATINOL) 40 mg/m2 = 69 mg in sodium chloride 0.9% 500 mL chemo infusion       Pre-Hydration       sodium chloride 0.9% 1,000 mL with magnesium sulfate 1 g, potassium chloride 20 mEq infusion       Post-Hydration       sodium chloride 0.9% bolus 1,000 mL  4/6/2022       Chemotherapy       CISplatin (PLATINOL) 40 mg/m2 = 69 mg in sodium chloride 0.9% 500 mL chemo infusion       Pre-Hydration       sodium chloride 0.9% 1,000 mL with magnesium sulfate 1 g, potassium chloride 20 mEq infusion       Post-Hydration       sodium chloride 0.9% bolus 1,000 mL    Supportive Plan Information  IV FLUIDS AND ELECTROLYTES   Christopher Jay MD   Upcoming Treatment Dates - IV FLUIDS AND ELECTROLYTES    No upcoming days in selected categories.          Christopher Jay MD  Hematology Oncology

## 2022-03-22 PROCEDURE — 77014 PR  CT GUIDANCE PLACEMENT RAD THERAPY FIELDS: CPT | Mod: 26,,, | Performed by: STUDENT IN AN ORGANIZED HEALTH CARE EDUCATION/TRAINING PROGRAM

## 2022-03-22 PROCEDURE — 77014 PR  CT GUIDANCE PLACEMENT RAD THERAPY FIELDS: ICD-10-PCS | Mod: 26,,, | Performed by: STUDENT IN AN ORGANIZED HEALTH CARE EDUCATION/TRAINING PROGRAM

## 2022-03-22 PROCEDURE — 77386 HC IMRT, COMPLEX: CPT | Performed by: STUDENT IN AN ORGANIZED HEALTH CARE EDUCATION/TRAINING PROGRAM

## 2022-03-22 PROCEDURE — 77014 HC CT GUIDANCE RADIATION THERAPY FLDS PLACEMENT: CPT | Mod: TC | Performed by: STUDENT IN AN ORGANIZED HEALTH CARE EDUCATION/TRAINING PROGRAM

## 2022-03-23 ENCOUNTER — INFUSION (OUTPATIENT)
Dept: INFUSION THERAPY | Facility: HOSPITAL | Age: 72
End: 2022-03-23
Payer: MEDICARE

## 2022-03-23 VITALS
WEIGHT: 136.69 LBS | SYSTOLIC BLOOD PRESSURE: 128 MMHG | TEMPERATURE: 98 F | BODY MASS INDEX: 20.72 KG/M2 | HEIGHT: 68 IN | DIASTOLIC BLOOD PRESSURE: 62 MMHG | RESPIRATION RATE: 20 BRPM | OXYGEN SATURATION: 87 % | HEART RATE: 75 BPM

## 2022-03-23 DIAGNOSIS — J06.9 UPPER RESPIRATORY TRACT INFECTION, UNSPECIFIED TYPE: Primary | ICD-10-CM

## 2022-03-23 DIAGNOSIS — R68.89 EXCESSIVE ORAL SECRETIONS: ICD-10-CM

## 2022-03-23 DIAGNOSIS — C02.9 SQUAMOUS CELL CANCER OF TONGUE: ICD-10-CM

## 2022-03-23 DIAGNOSIS — C77.0 SECONDARY MALIGNANT NEOPLASM OF CERVICAL LYMPH NODE: Primary | ICD-10-CM

## 2022-03-23 PROCEDURE — 96375 TX/PRO/DX INJ NEW DRUG ADDON: CPT

## 2022-03-23 PROCEDURE — 96413 CHEMO IV INFUSION 1 HR: CPT

## 2022-03-23 PROCEDURE — 77014 PR  CT GUIDANCE PLACEMENT RAD THERAPY FIELDS: CPT | Mod: 26,,, | Performed by: STUDENT IN AN ORGANIZED HEALTH CARE EDUCATION/TRAINING PROGRAM

## 2022-03-23 PROCEDURE — 77014 HC CT GUIDANCE RADIATION THERAPY FLDS PLACEMENT: CPT | Mod: TC | Performed by: STUDENT IN AN ORGANIZED HEALTH CARE EDUCATION/TRAINING PROGRAM

## 2022-03-23 PROCEDURE — 77336 RADIATION PHYSICS CONSULT: CPT | Performed by: STUDENT IN AN ORGANIZED HEALTH CARE EDUCATION/TRAINING PROGRAM

## 2022-03-23 PROCEDURE — 96367 TX/PROPH/DG ADDL SEQ IV INF: CPT

## 2022-03-23 PROCEDURE — 77014 PR  CT GUIDANCE PLACEMENT RAD THERAPY FIELDS: ICD-10-PCS | Mod: 26,,, | Performed by: STUDENT IN AN ORGANIZED HEALTH CARE EDUCATION/TRAINING PROGRAM

## 2022-03-23 PROCEDURE — 77386 HC IMRT, COMPLEX: CPT | Performed by: STUDENT IN AN ORGANIZED HEALTH CARE EDUCATION/TRAINING PROGRAM

## 2022-03-23 PROCEDURE — 63600175 PHARM REV CODE 636 W HCPCS: Performed by: STUDENT IN AN ORGANIZED HEALTH CARE EDUCATION/TRAINING PROGRAM

## 2022-03-23 PROCEDURE — 25000003 PHARM REV CODE 250: Performed by: STUDENT IN AN ORGANIZED HEALTH CARE EDUCATION/TRAINING PROGRAM

## 2022-03-23 PROCEDURE — 96360 HYDRATION IV INFUSION INIT: CPT

## 2022-03-23 PROCEDURE — 96361 HYDRATE IV INFUSION ADD-ON: CPT

## 2022-03-23 RX ORDER — HEPARIN 100 UNIT/ML
500 SYRINGE INTRAVENOUS
Status: DISCONTINUED | OUTPATIENT
Start: 2022-03-23 | End: 2022-03-23 | Stop reason: HOSPADM

## 2022-03-23 RX ORDER — SODIUM CHLORIDE 0.9 % (FLUSH) 0.9 %
10 SYRINGE (ML) INJECTION
Status: DISCONTINUED | OUTPATIENT
Start: 2022-03-23 | End: 2022-03-23 | Stop reason: HOSPADM

## 2022-03-23 RX ORDER — GLYCOPYRROLATE 1 MG/5ML
1 SOLUTION ORAL 3 TIMES DAILY PRN
Qty: 473 ML | Refills: 1 | Status: SHIPPED | OUTPATIENT
Start: 2022-03-23 | End: 2022-08-18 | Stop reason: SDUPTHER

## 2022-03-23 RX ORDER — AMOXICILLIN AND CLAVULANATE POTASSIUM 500; 125 MG/1; MG/1
1 TABLET, FILM COATED ORAL 2 TIMES DAILY
Qty: 20 TABLET | Refills: 0 | Status: SHIPPED | OUTPATIENT
Start: 2022-03-23 | End: 2022-04-02

## 2022-03-23 RX ADMIN — MAGNESIUM SULFATE HEPTAHYDRATE 500 ML/HR: 500 INJECTION, SOLUTION INTRAMUSCULAR; INTRAVENOUS at 07:03

## 2022-03-23 RX ADMIN — APREPITANT 130 MG: 130 INJECTION, EMULSION INTRAVENOUS at 09:03

## 2022-03-23 RX ADMIN — SODIUM CHLORIDE 1000 ML: 0.9 INJECTION, SOLUTION INTRAVENOUS at 11:03

## 2022-03-23 RX ADMIN — CISPLATIN 69 MG: 1 INJECTION INTRAVENOUS at 10:03

## 2022-03-23 RX ADMIN — PALONOSETRON HYDROCHLORIDE 0.25 MG: 0.25 INJECTION, SOLUTION INTRAVENOUS at 09:03

## 2022-03-23 NOTE — PLAN OF CARE
Pt received Cisplatin & IVFs today and tolerated well, without complications. Educated patient about Cisplatin & IVFs  (indications, side effects, possible reactions, chemotherapy precautions) and verbalized understanding. PIV positive for blood return, saline locked and removed prior to DC, catheter tip intact. Pt DC with no distress noted, WC off unit via wife, w/o event, pleased. SPo2 did decend and Dr Jay made aware, Abx called into pharmacy and instructed pts family to . Pt has had more thick yellow sputum being produced, 30ml in suction container now.

## 2022-03-24 PROCEDURE — 77014 PR  CT GUIDANCE PLACEMENT RAD THERAPY FIELDS: ICD-10-PCS | Mod: 26,,, | Performed by: STUDENT IN AN ORGANIZED HEALTH CARE EDUCATION/TRAINING PROGRAM

## 2022-03-24 PROCEDURE — 77014 HC CT GUIDANCE RADIATION THERAPY FLDS PLACEMENT: CPT | Mod: TC | Performed by: STUDENT IN AN ORGANIZED HEALTH CARE EDUCATION/TRAINING PROGRAM

## 2022-03-24 PROCEDURE — 77386 HC IMRT, COMPLEX: CPT | Performed by: STUDENT IN AN ORGANIZED HEALTH CARE EDUCATION/TRAINING PROGRAM

## 2022-03-24 PROCEDURE — 77014 PR  CT GUIDANCE PLACEMENT RAD THERAPY FIELDS: CPT | Mod: 26,,, | Performed by: STUDENT IN AN ORGANIZED HEALTH CARE EDUCATION/TRAINING PROGRAM

## 2022-03-28 ENCOUNTER — DOCUMENTATION ONLY (OUTPATIENT)
Dept: RADIATION THERAPY | Facility: HOSPITAL | Age: 72
End: 2022-03-28
Payer: MEDICARE

## 2022-03-28 ENCOUNTER — OFFICE VISIT (OUTPATIENT)
Dept: HEMATOLOGY/ONCOLOGY | Facility: CLINIC | Age: 72
End: 2022-03-28
Payer: MEDICARE

## 2022-03-28 ENCOUNTER — TELEPHONE (OUTPATIENT)
Dept: PHARMACY | Facility: CLINIC | Age: 72
End: 2022-03-28
Payer: MEDICARE

## 2022-03-28 ENCOUNTER — LAB VISIT (OUTPATIENT)
Dept: LAB | Facility: HOSPITAL | Age: 72
End: 2022-03-28
Attending: STUDENT IN AN ORGANIZED HEALTH CARE EDUCATION/TRAINING PROGRAM
Payer: MEDICARE

## 2022-03-28 ENCOUNTER — DOCUMENTATION ONLY (OUTPATIENT)
Dept: RADIATION ONCOLOGY | Facility: CLINIC | Age: 72
End: 2022-03-28
Payer: MEDICARE

## 2022-03-28 VITALS
HEART RATE: 79 BPM | HEIGHT: 68 IN | SYSTOLIC BLOOD PRESSURE: 98 MMHG | DIASTOLIC BLOOD PRESSURE: 58 MMHG | WEIGHT: 136.56 LBS | OXYGEN SATURATION: 88 % | BODY MASS INDEX: 20.7 KG/M2 | TEMPERATURE: 99 F

## 2022-03-28 DIAGNOSIS — C77.0 SECONDARY MALIGNANT NEOPLASM OF CERVICAL LYMPH NODE: ICD-10-CM

## 2022-03-28 DIAGNOSIS — R68.89 EXCESSIVE ORAL SECRETIONS: ICD-10-CM

## 2022-03-28 DIAGNOSIS — J96.11 CHRONIC RESPIRATORY FAILURE WITH HYPOXIA, ON HOME O2 THERAPY: ICD-10-CM

## 2022-03-28 DIAGNOSIS — Z99.81 CHRONIC RESPIRATORY FAILURE WITH HYPOXIA, ON HOME O2 THERAPY: ICD-10-CM

## 2022-03-28 DIAGNOSIS — C02.9 SQUAMOUS CELL CANCER OF TONGUE: ICD-10-CM

## 2022-03-28 DIAGNOSIS — C02.9 SQUAMOUS CELL CANCER OF TONGUE: Primary | ICD-10-CM

## 2022-03-28 DIAGNOSIS — E87.5 HYPERKALEMIA: ICD-10-CM

## 2022-03-28 DIAGNOSIS — Z79.899 IMMUNODEFICIENCY DUE TO DRUG THERAPY: ICD-10-CM

## 2022-03-28 DIAGNOSIS — D84.821 IMMUNODEFICIENCY DUE TO DRUG THERAPY: ICD-10-CM

## 2022-03-28 DIAGNOSIS — E44.0 MODERATE PROTEIN-CALORIE MALNUTRITION: ICD-10-CM

## 2022-03-28 LAB
ALBUMIN SERPL BCP-MCNC: 2.9 G/DL (ref 3.5–5.2)
ALP SERPL-CCNC: 78 U/L (ref 55–135)
ALT SERPL W/O P-5'-P-CCNC: 16 U/L (ref 10–44)
ANION GAP SERPL CALC-SCNC: 8 MMOL/L (ref 8–16)
ANISOCYTOSIS BLD QL SMEAR: SLIGHT
AST SERPL-CCNC: 15 U/L (ref 10–40)
BASOPHILS # BLD AUTO: 0.01 K/UL (ref 0–0.2)
BASOPHILS NFR BLD: 0.3 % (ref 0–1.9)
BILIRUB SERPL-MCNC: 0.2 MG/DL (ref 0.1–1)
BUN SERPL-MCNC: 22 MG/DL (ref 8–23)
CALCIUM SERPL-MCNC: 9.7 MG/DL (ref 8.7–10.5)
CHLORIDE SERPL-SCNC: 90 MMOL/L (ref 95–110)
CO2 SERPL-SCNC: 35 MMOL/L (ref 23–29)
CREAT SERPL-MCNC: 0.6 MG/DL (ref 0.5–1.4)
DACRYOCYTES BLD QL SMEAR: ABNORMAL
DIFFERENTIAL METHOD: ABNORMAL
DOHLE BOD BLD QL SMEAR: PRESENT
EOSINOPHIL # BLD AUTO: 0.1 K/UL (ref 0–0.5)
EOSINOPHIL NFR BLD: 1.4 % (ref 0–8)
ERYTHROCYTE [DISTWIDTH] IN BLOOD BY AUTOMATED COUNT: 15.9 % (ref 11.5–14.5)
EST. GFR  (AFRICAN AMERICAN): >60 ML/MIN/1.73 M^2
EST. GFR  (NON AFRICAN AMERICAN): >60 ML/MIN/1.73 M^2
GLUCOSE SERPL-MCNC: 193 MG/DL (ref 70–110)
HCT VFR BLD AUTO: 26.4 % (ref 40–54)
HGB BLD-MCNC: 8.4 G/DL (ref 14–18)
HYPOCHROMIA BLD QL SMEAR: ABNORMAL
IMM GRANULOCYTES # BLD AUTO: 0.04 K/UL (ref 0–0.04)
IMM GRANULOCYTES NFR BLD AUTO: 1.1 % (ref 0–0.5)
LYMPHOCYTES # BLD AUTO: 0.3 K/UL (ref 1–4.8)
LYMPHOCYTES NFR BLD: 7.5 % (ref 18–48)
MAGNESIUM SERPL-MCNC: 2 MG/DL (ref 1.6–2.6)
MCH RBC QN AUTO: 28.6 PG (ref 27–31)
MCHC RBC AUTO-ENTMCNC: 31.8 G/DL (ref 32–36)
MCV RBC AUTO: 90 FL (ref 82–98)
MONOCYTES # BLD AUTO: 0.6 K/UL (ref 0.3–1)
MONOCYTES NFR BLD: 15.8 % (ref 4–15)
NEUTROPHILS # BLD AUTO: 2.7 K/UL (ref 1.8–7.7)
NEUTROPHILS NFR BLD: 73.9 % (ref 38–73)
NRBC BLD-RTO: 0 /100 WBC
OVALOCYTES BLD QL SMEAR: ABNORMAL
PLATELET # BLD AUTO: 204 K/UL (ref 150–450)
PLATELET BLD QL SMEAR: ABNORMAL
PMV BLD AUTO: 9.2 FL (ref 9.2–12.9)
POIKILOCYTOSIS BLD QL SMEAR: SLIGHT
POLYCHROMASIA BLD QL SMEAR: ABNORMAL
POTASSIUM SERPL-SCNC: 5.3 MMOL/L (ref 3.5–5.1)
PROT SERPL-MCNC: 6.2 G/DL (ref 6–8.4)
RBC # BLD AUTO: 2.94 M/UL (ref 4.6–6.2)
SODIUM SERPL-SCNC: 133 MMOL/L (ref 136–145)
TOXIC GRANULES BLD QL SMEAR: PRESENT
WBC # BLD AUTO: 3.61 K/UL (ref 3.9–12.7)

## 2022-03-28 PROCEDURE — 36415 COLL VENOUS BLD VENIPUNCTURE: CPT | Performed by: STUDENT IN AN ORGANIZED HEALTH CARE EDUCATION/TRAINING PROGRAM

## 2022-03-28 PROCEDURE — 3288F PR FALLS RISK ASSESSMENT DOCUMENTED: ICD-10-PCS | Mod: CPTII,S$GLB,, | Performed by: PHYSICIAN ASSISTANT

## 2022-03-28 PROCEDURE — 80053 COMPREHEN METABOLIC PANEL: CPT | Performed by: STUDENT IN AN ORGANIZED HEALTH CARE EDUCATION/TRAINING PROGRAM

## 2022-03-28 PROCEDURE — 83735 ASSAY OF MAGNESIUM: CPT | Performed by: STUDENT IN AN ORGANIZED HEALTH CARE EDUCATION/TRAINING PROGRAM

## 2022-03-28 PROCEDURE — 85025 COMPLETE CBC W/AUTO DIFF WBC: CPT | Performed by: STUDENT IN AN ORGANIZED HEALTH CARE EDUCATION/TRAINING PROGRAM

## 2022-03-28 PROCEDURE — 77014 PR  CT GUIDANCE PLACEMENT RAD THERAPY FIELDS: ICD-10-PCS | Mod: 26,,, | Performed by: STUDENT IN AN ORGANIZED HEALTH CARE EDUCATION/TRAINING PROGRAM

## 2022-03-28 PROCEDURE — 99999 PR PBB SHADOW E&M-EST. PATIENT-LVL IV: CPT | Mod: PBBFAC,,, | Performed by: PHYSICIAN ASSISTANT

## 2022-03-28 PROCEDURE — 4010F ACE/ARB THERAPY RXD/TAKEN: CPT | Mod: CPTII,S$GLB,, | Performed by: PHYSICIAN ASSISTANT

## 2022-03-28 PROCEDURE — 77014 PR  CT GUIDANCE PLACEMENT RAD THERAPY FIELDS: CPT | Mod: 26,,, | Performed by: STUDENT IN AN ORGANIZED HEALTH CARE EDUCATION/TRAINING PROGRAM

## 2022-03-28 PROCEDURE — 99215 OFFICE O/P EST HI 40 MIN: CPT | Mod: S$GLB,,, | Performed by: PHYSICIAN ASSISTANT

## 2022-03-28 PROCEDURE — 3078F PR MOST RECENT DIASTOLIC BLOOD PRESSURE < 80 MM HG: ICD-10-PCS | Mod: CPTII,S$GLB,, | Performed by: PHYSICIAN ASSISTANT

## 2022-03-28 PROCEDURE — 3008F BODY MASS INDEX DOCD: CPT | Mod: CPTII,S$GLB,, | Performed by: PHYSICIAN ASSISTANT

## 2022-03-28 PROCEDURE — 1126F PR PAIN SEVERITY QUANTIFIED, NO PAIN PRESENT: ICD-10-PCS | Mod: CPTII,S$GLB,, | Performed by: PHYSICIAN ASSISTANT

## 2022-03-28 PROCEDURE — 3078F DIAST BP <80 MM HG: CPT | Mod: CPTII,S$GLB,, | Performed by: PHYSICIAN ASSISTANT

## 2022-03-28 PROCEDURE — 77386 HC IMRT, COMPLEX: CPT | Performed by: STUDENT IN AN ORGANIZED HEALTH CARE EDUCATION/TRAINING PROGRAM

## 2022-03-28 PROCEDURE — 4010F PR ACE/ARB THEARPY RXD/TAKEN: ICD-10-PCS | Mod: CPTII,S$GLB,, | Performed by: PHYSICIAN ASSISTANT

## 2022-03-28 PROCEDURE — 99215 PR OFFICE/OUTPT VISIT, EST, LEVL V, 40-54 MIN: ICD-10-PCS | Mod: S$GLB,,, | Performed by: PHYSICIAN ASSISTANT

## 2022-03-28 PROCEDURE — 77014 HC CT GUIDANCE RADIATION THERAPY FLDS PLACEMENT: CPT | Mod: TC | Performed by: STUDENT IN AN ORGANIZED HEALTH CARE EDUCATION/TRAINING PROGRAM

## 2022-03-28 PROCEDURE — 1101F PT FALLS ASSESS-DOCD LE1/YR: CPT | Mod: CPTII,S$GLB,, | Performed by: PHYSICIAN ASSISTANT

## 2022-03-28 PROCEDURE — 3074F PR MOST RECENT SYSTOLIC BLOOD PRESSURE < 130 MM HG: ICD-10-PCS | Mod: CPTII,S$GLB,, | Performed by: PHYSICIAN ASSISTANT

## 2022-03-28 PROCEDURE — 1126F AMNT PAIN NOTED NONE PRSNT: CPT | Mod: CPTII,S$GLB,, | Performed by: PHYSICIAN ASSISTANT

## 2022-03-28 PROCEDURE — 3008F PR BODY MASS INDEX (BMI) DOCUMENTED: ICD-10-PCS | Mod: CPTII,S$GLB,, | Performed by: PHYSICIAN ASSISTANT

## 2022-03-28 PROCEDURE — 99999 PR PBB SHADOW E&M-EST. PATIENT-LVL IV: ICD-10-PCS | Mod: PBBFAC,,, | Performed by: PHYSICIAN ASSISTANT

## 2022-03-28 PROCEDURE — 3288F FALL RISK ASSESSMENT DOCD: CPT | Mod: CPTII,S$GLB,, | Performed by: PHYSICIAN ASSISTANT

## 2022-03-28 PROCEDURE — 1101F PR PT FALLS ASSESS DOC 0-1 FALLS W/OUT INJ PAST YR: ICD-10-PCS | Mod: CPTII,S$GLB,, | Performed by: PHYSICIAN ASSISTANT

## 2022-03-28 PROCEDURE — 3074F SYST BP LT 130 MM HG: CPT | Mod: CPTII,S$GLB,, | Performed by: PHYSICIAN ASSISTANT

## 2022-03-28 NOTE — PROGRESS NOTES
Oncology Nutrition  Radiation Oncology Weekly Check     Treatment Week: 4 (Day 18)    Fareed Richard    1950    Reason for Visit: Pt here for weekly visit due to high nutritional risk radiation therapy treatment (squamous cell carcinoma of the oral tongue)    Nutrition Assessment    Patient reports that he continues to tolerate his tube feedings well. Using 6 cartons of Isosource 1.5 daily. His PEG tube is clean and patent. Insertion site is also clean and skin is intact.  He is on antibiotics due to concern for infection (noted last week) and is wondering about probiotics/kefir.     Weight:  139.1lb (post chemo)  Height: 68in    Usual BW: 162lb  Weight Change: stable/slight increase     Nutrition Impact Symptoms    Dysphagia   Constipation (improved with Mariajose Lax)     Nutrition Recommendations    Continue current tube feeding regimen as this is allowing for adequate weight gain   Ok to use 6-8oz kefir daily -discussed it may need to be thinned with water before putting through PEG tube and flushed well after   Reviewed skin care for PEG site    Follow up weekly or as needed during treatment       Elida Rene, MPH, RD, , LDN, FAND   [phone]

## 2022-03-28 NOTE — PROGRESS NOTES
PATIENT: Fareed Richard Jr.  MRN: [id number]  DATE: 3/30/2022      Diagnosis:   1. Squamous cell cancer of tongue    2. Secondary malignant neoplasm of cervical lymph node    3. Immunodeficiency due to drug therapy    4. Excessive oral secretions    5. Chronic respiratory failure with hypoxia, on home O2 therapy    6. Hyperkalemia    7. Moderate protein-calorie malnutrition        Chief Complaint: Squamous cell cancer of tongue      Oncologic History:    Oncologic History 1. Squamous cell carcinoma of tongue    Oncologic Treatment 1.  1/4/22 total glossectomy, bilateral neck dissection, bilateral cervical facial advancement flaps and anterolateral thigh free flap reconstruction of his glossectomy defect   2. 2/28/22 adjuvant chemoradiation with cisplatin    Pathology Squamous cell carcinoma, poorly differentiated, p16 negative  Final pathology: pT4a:  Moderately advanced local disease.  Tumor size = 6.0 cm with DOI = 29 mm and invades adjacent structures.   pN3b:  Metastasis in single contralateral node, SALINAS(+).   pM:  Not applicable.   Superior soft tissue margin of the left neck is involved by tumor           Subjective:    Interval History: Mr. Richard is here for follow up    Oncological History copied from medical chart: Initially saw Aletha Cook NP (ENT) 11/29/21 regarding non-healing left sided tongue lesion for about 6 weeks.  Associated symptoms included tongue pain, bleeding, left otalgia, weight loss from difficulty eating, and bump under left chin that enlarged and was refractory to oral antibiotics and nystatin.   History significant for skin cancer of the left cheek and right lower lip about 5-6 years ago. He reported he had a flap done with Dr. Starks. His wife notes that one of the cancers was SCC and the other was basal cell but she cannot call which was which. 12/6/21 CT neck was done (findings below). 12/15/21 he saw Dr. Smalls who performed an FNA of his left neck mass, which finalized as squamous  cell carcinoma, p16 negative.   12/16/21 CT chest without contrast was done (findings below).  1/4/22 underwent total glossectomy, bilateral neck dissection, bilateral cervical facial advancement flaps and anterolateral thigh free flap reconstruction of his glossectomy defect.  Final pathology [id number], +SALINAS (microscopic), +margin (superior soft tissue margin of left neck).    He has completed 4 weeks of chemoradiation.     Interval History: Mr. Richard presents to the Oncology clinic to continue chemoRT with weekly Cisplatin. He is s/p 4 weeks of chemoRT and has tolerated it well. He does continue to have pain around the area of the RT mask (chin area) and sores in his lips. Still struggles with secretions but this has not worsened. no worsening dyspnea.  Still on continuous O2.  No new issues with chemotherapy so far.  He has a peg tube and Using 6 cartons of Isosource daily. His PEG tube is clean and patent. Insertion site is also clean and skin is intact.  He denies any issues getting peripheral IVs or labs.  Tube feed dependent; all nutrition is via peg tube. S/p glossectomy.    Former smoker, 110 pack years.    Past Medical History:   Past Medical History:   Diagnosis Date    Cancer     skin to Rt forearm and face    COPD (chronic obstructive pulmonary disease)     Hyperlipidemia     Hypertension     Pseudoaneurysm        Past Surgical HIstory:   Past Surgical History:   Procedure Laterality Date    ABDOMINAL SURGERY      stents placed in liver and large intestines, per patient    CAROTID STENT      CORONARY STENT PLACEMENT  01/2000    DISSECTION OF NECK Bilateral 1/4/2022    Procedure: DISSECTION, NECK;  Surgeon: Naeem Smalls MD;  Location: [facility] OR [address] [place] [address];  Service: ENT;  Laterality: Bilateral;    ESOPHAGOGASTRODUODENOSCOPY W/ PEG N/A 1/4/2022    Procedure: EGD, WITH PEG TUBE INSERTION;  Surgeon: Anthony Calabrese MD;  Location: [facility] OR [address] [place] [address];  Service: General;  Laterality: N/A;    EYE  SURGERY      Cataract bilateral    femoral stents      bilateral    FLAP PROCEDURE N/A 1/3/2022    Procedure: CREATION, FREE FLAP;  Surgeon: Naeem Smalls MD;  Location: [facility] OR [facility] FLR;  Service: ENT;  Laterality: N/A;    FLAP PROCEDURE Right 1/4/2022    Procedure: CREATION, FREE FLAP;  Surgeon: Stacey Conde MD;  Location: [facility] OR [facility] FLR;  Service: ENT;  Laterality: Right;  Ischemic start 1203  Ischemic stop 1353    GLOSSECTOMY N/A 1/4/2022    Procedure: GLOSSECTOMY;  Surgeon: Naeem Smalsl MD;  Location: [facility] OR [facility] FLR;  Service: ENT;  Laterality: N/A;    RADICAL NECK DISSECTION Left 1/3/2022    Procedure: DISSECTION, NECK, RADICAL;  Surgeon: Naeem Smalls MD;  [facility];  Service: ENT;  Laterality: Left;    SKIN CANCER EXCISION      TRACHEOTOMY N/A 1/4/2022    Procedure: TRACHEOTOMY;  Surgeon: Naeem Smalls MD;  [facility];  Service: ENT;  Laterality: N/A;       Family History: History reviewed. No pertinent family history.    Social History:  reports that he quit smoking about 3 years ago. His smoking use included cigarettes. He started smoking about 58 years ago. He has a 80.00 pack-year smoking history. He has never used smokeless tobacco. He reports current alcohol use of about 3.0 standard drinks of alcohol per week. He reports that he does not use drugs.    Allergies:  Review of patient's allergies indicates:  No Known Allergies    Medications:  Current Outpatient Medications   Medication Sig Dispense Refill    acetaminophen (TYLENOL) 325 MG tablet 2 tablets (650 mg total) by Per G Tube route every 6 (six) hours.  0    albuterol-ipratropium (DUO-NEB) 2.5 mg-0.5 mg/3 mL nebulizer solution Take 3 mLs by nebulization every 4 (four) hours as needed for Wheezing. Rescue 75 mL 0    amLODIPine (NORVASC) 10 MG tablet Take 10 mg by mouth once daily.      amoxicillin-clavulanate 500-125mg (AUGMENTIN) 500-125 mg Tab Take 1 tablet (500 mg total)  by mouth 2 (two) times daily. for 10 days 20 tablet 0    aspirin 81 MG Chew 1 tablet (81 mg total) by Per G Tube route once daily.  0    atorvastatin (LIPITOR) 20 MG tablet 1 tablet (20 mg total) by Per G Tube route once daily. 90 tablet 3    bisacodyL (DULCOLAX) 10 mg Supp Place 1 suppository (10 mg total) rectally daily as needed.  0    clopidogreL (PLAVIX) 75 mg tablet 1 tablet (75 mg total) by Per G Tube route once daily. 30 tablet 11    duke's soln (benadryl 30 mL, mylanta 30 mL, LIDOcaine 30 mL, nystatin 30 mL) 120mL Take 10 mLs by mouth 4 (four) times daily as needed (mucositis). 360 mL 0    enoxaparin (LOVENOX) 40 mg/0.4 mL Syrg Inject 0.4 mLs (40 mg total) into the skin once daily. (Patient not taking: No sig reported)      folic acid (FOLVITE) 1 MG tablet 1 tablet (1 mg total) by Per G Tube route once daily.  0    glycopyrrolate (CUVPOSA) 1 mg/5 mL (0.2 mg/mL) Soln Take 5 mLs (1 mg total) by mouth 3 (three) times daily as needed (TO REDUCE SECRETIONS). 473 mL 1    guaiFENesin 200 mg/5 mL Liqd Take 400 mg by mouth every 4 (four) hours as needed (for secretions). 473 mL 3    losartan (COZAAR) 50 MG tablet 1 tablet (50 mg total) by Per G Tube route once daily. 90 tablet 3    melatonin (MELATIN) 3 mg tablet 2 tablets (6 mg total) by Per G Tube route nightly. (Patient not taking: No sig reported)  0    metoprolol tartrate (LOPRESSOR) 100 MG tablet 1 tablet (100 mg total) by Per G Tube route 2 (two) times daily. 60 tablet 11    ondansetron (ZOFRAN-ODT) 8 MG TbDL Take 1 tablet (8 mg total) by mouth every 8 (eight) hours as needed (nausea). 30 tablet 1    oxyCODONE (ROXICODONE) 5 mg/5 mL Soln 5 mLs (5 mg total) by Per G Tube route every 4 (four) hours as needed.  0    polyethylene glycol (GLYCOLAX) 17 gram PwPk 17 g by Per G Tube route 2 (two) times daily. (Patient not taking: No sig reported)  0    senna-docusate 8.6-50 mg (PERICOLACE) 8.6-50 mg per tablet 1 tablet by Per G Tube route 2 (two)  times daily. (Patient not taking: No sig reported)      sodium chloride (OCEAN) 0.65 % nasal spray 1 spray by Nasal route as needed for Congestion. (Patient not taking: No sig reported)  12    sodium chloride 3% 3 % nebulizer solution Take 4 mLs by nebulization every 8 (eight) hours.      thiamine 100 MG tablet 1 tablet (100 mg total) by Per G Tube route once daily. (Patient not taking: No sig reported)       No current facility-administered medications for this visit.     Facility-Administered Medications Ordered in Other Visits   Medication Dose Route Frequency Provider Last Rate Last Admin    alteplase injection 2 mg  2 mg Intra-Catheter PRN Anthony Holliday MD        CISplatin (PLATINOL) 40 mg/m2 = 69 mg in sodium chloride 0.9% 500 mL chemo infusion  40 mg/m2 Intravenous 1 time in Clinic/JAMES Holliday MD        heparin, porcine (PF) 100 unit/mL injection flush 500 Units  500 Units Intravenous PRN Anthony Holliday MD        palonosetron 0.25mg/dexamethasone 12mg in NS IVPB   Intravenous 1 time in Clinic/JAMES Holliday MD   0.25 mg at 03/30/22 1028    sodium chloride 0.9% bolus 1,000 mL  1,000 mL Intravenous 1 time in Clinic/JAMES Holliday MD        sodium chloride 0.9% flush 10 mL  10 mL Intravenous PRN Anthony Holliday MD           Review of Systems   Constitutional: Negative for activity change, appetite change, chills, diaphoresis, fatigue, fever and unexpected weight change.   HENT: Positive for mouth sores, trouble swallowing and voice change. Negative for ear pain and nosebleeds.    Eyes: Negative for visual disturbance.   Respiratory: Positive for cough and shortness of breath. Negative for chest tightness and wheezing.    Cardiovascular: Negative for chest pain and leg swelling.   Gastrointestinal: Negative for abdominal distention, abdominal pain, blood in stool, constipation, diarrhea, nausea and vomiting.   Endocrine: Negative for cold intolerance and  "heat intolerance.   Genitourinary: Negative for difficulty urinating and dysuria.   Musculoskeletal: Negative for arthralgias and back pain.   Skin: Negative for color change.   Neurological: Positive for weakness. Negative for dizziness, light-headedness, numbness and headaches.   Hematological: Negative for adenopathy. Does not bruise/bleed easily.   Psychiatric/Behavioral: Negative for confusion.       ECOG Performance Status:     ECOG SCORE           Objective:      Vitals:   Vitals:    03/28/22 1455   BP: (!) 98/58   Pulse: 79   Temp: 98.6 °F (37 °C)   TempSrc: Oral   SpO2: (!) 88%   Weight: 62 kg (136 lb 9.2 oz)   Height: 5' 8" (1.727 m)     BMI: Body mass index is 20.77 kg/m².    Physical Exam  Constitutional:       General: He is not in acute distress.     Appearance: Normal appearance. He is ill-appearing.   HENT:      Head: Normocephalic and atraumatic.      Mouth/Throat:      Pharynx: Posterior oropharyngeal erythema present. No oropharyngeal exudate.   Eyes:      General: No scleral icterus.     Extraocular Movements: Extraocular movements intact.      Conjunctiva/sclera: Conjunctivae normal.      Pupils: Pupils are equal, round, and reactive to light.   Neck:      Comments: +trach  Cardiovascular:      Rate and Rhythm: Normal rate and regular rhythm.      Heart sounds: No murmur heard.    No friction rub. No gallop.   Pulmonary:      Effort: Pulmonary effort is normal. No respiratory distress.      Breath sounds: No stridor. No wheezing, rhonchi or rales.      Comments: Diminished breath sounds all lung fields  Abdominal:      General: Bowel sounds are normal. There is no distension.      Palpations: Abdomen is soft. There is no mass.      Tenderness: There is no abdominal tenderness. There is no guarding or rebound.      Comments: +peg tube site c/d/i   Musculoskeletal:         General: Normal range of motion.      Cervical back: Normal range of motion and neck supple.      Right lower leg: No edema. "      Left lower leg: No edema.   Skin:     General: Skin is warm and dry.      Findings: No erythema.   Neurological:      General: No focal deficit present.      Mental Status: He is alert.      Motor: Weakness present.         Laboratory Data:   Recent Results (from the past 168 hour(s))   CBC Auto Differential    Collection Time: 03/28/22  2:52 PM   Result Value Ref Range    WBC 3.61 (L) 3.90 - 12.70 K/uL    RBC 2.94 (L) 4.60 - 6.20 M/uL    Hemoglobin 8.4 (L) 14.0 - 18.0 g/dL    Hematocrit 26.4 (L) 40.0 - 54.0 %    MCV 90 82 - 98 fL    MCH 28.6 27.0 - 31.0 pg    MCHC 31.8 (L) 32.0 - 36.0 g/dL    RDW 15.9 (H) 11.5 - 14.5 %    Platelets 204 150 - 450 K/uL    MPV 9.2 9.2 - 12.9 fL    Immature Granulocytes 1.1 (H) 0.0 - 0.5 %    Gran # (ANC) 2.7 1.8 - 7.7 K/uL    Immature Grans (Abs) 0.04 0.00 - 0.04 K/uL    Lymph # 0.3 (L) 1.0 - 4.8 K/uL    Mono # 0.6 0.3 - 1.0 K/uL    Eos # 0.1 0.0 - 0.5 K/uL    Baso # 0.01 0.00 - 0.20 K/uL    nRBC 0 0 /100 WBC    Gran % 73.9 (H) 38.0 - 73.0 %    Lymph % 7.5 (L) 18.0 - 48.0 %    Mono % 15.8 (H) 4.0 - 15.0 %    Eosinophil % 1.4 0.0 - 8.0 %    Basophil % 0.3 0.0 - 1.9 %    Platelet Estimate Appears normal     Aniso Slight     Poik Slight     Poly Occasional     Hypo Occasional     Ovalocytes Occasional     Tear Drop Cells Occasional     Dohle Bodies Present     Toxic Granulation Present     Differential Method Automated    Comprehensive Metabolic Panel    Collection Time: 03/28/22  2:52 PM   Result Value Ref Range    Sodium 133 (L) 136 - 145 mmol/L    Potassium 5.3 (H) 3.5 - 5.1 mmol/L    Chloride 90 (L) 95 - 110 mmol/L    CO2 35 (H) 23 - 29 mmol/L    Glucose 193 (H) 70 - 110 mg/dL    BUN 22 8 - 23 mg/dL    Creatinine 0.6 0.5 - 1.4 mg/dL    Calcium 9.7 8.7 - 10.5 mg/dL    Total Protein 6.2 6.0 - 8.4 g/dL    Albumin 2.9 (L) 3.5 - 5.2 g/dL    Total Bilirubin 0.2 0.1 - 1.0 mg/dL    Alkaline Phosphatase 78 55 - 135 U/L    AST 15 10 - 40 U/L    ALT 16 10 - 44 U/L    Anion Gap 8 8 - 16  mmol/L    eGFR if African American >60.0 >60 mL/min/1.73 m^2    eGFR if non African American >60.0 >60 mL/min/1.73 m^2   Magnesium    Collection Time: 03/28/22  2:52 PM   Result Value Ref Range    Magnesium 2.0 1.6 - 2.6 mg/dL       Imaging:   NM PET/CT 3/4/2022:  Impression:     Hypermetabolic level 1A lymph node and left floor of mouth lesion suspicious for malignancy.     Probable inflammatory/infectious right upper lobe opacities.     Significant atherosclerotic calcifications.      Assessment:       1. Squamous cell cancer of tongue    2. Secondary malignant neoplasm of cervical lymph node    3. Immunodeficiency due to drug therapy    4. Excessive oral secretions    5. Chronic respiratory failure with hypoxia, on home O2 therapy    6. Hyperkalemia    7. Moderate protein-calorie malnutrition           Plan:       Problem List Items Addressed This Visit        Pulmonary    Chronic respiratory failure with hypoxia, on home O2 therapy       Oncology    Squamous cell cancer of tongue - Primary    Overview     12/15/21 FNA left neck mass : squamous cell carcinoma, p16 negative  1/4/22 total glossectomy, bilateral neck dissection, bilateral cervical facial advancement flaps and anterolateral thigh free flap reconstruction of his glossectomy defect.  Final path :  [id number] (+microscopic SALINAS, single contralateral node), superior soft tissue margin of left neck with tumor.    2/28/22 start chemoradiation           Secondary malignant neoplasm of cervical lymph node    Overview     See sq.c.c. tongue              Endocrine    Protein-calorie malnutrition       Other    Immunodeficiency due to drug therapy    Overview     Secondary to chemotherapy             Other Visit Diagnoses     Excessive oral secretions        Hyperkalemia            1-3. Mr. Richard is doing well and continues to tolerate chemoRT with weekly Cisplatin well. Pain is stable and he continues to use his PEG tube for feeding. Lab work and PS are adequate  for treatment. Clinically, he is doing well today and will proceed with week 5/6.   -Proceed with week 5 of chemoRT with weekly Cisplatin on Wednesday  -RTC in 1 week for cycle 6    4. Due to RT. Stable. Will continue to monitor.     5. Continue on O2. No signs of respiratory distress. Vital signs are good.   Clinically, he is doing well today. Will continue to monitor.     6. K 5.3. Stable. Will continue to monitor.   Will consider Kayexalate in the future if needed. No chest pain or concern for cardiac complications at this time.     7. Improving. Continue with PEG tube feedings.   Continue to f/u with nutritionist    RTC in 1 week    Pte and family members displayed understanding of the above encounter and treatment plan. All thoughtful questions were answered to their satisfaction. Pte was advised to notify the care team or proceed to the ER if signs and symptoms worsen.     ALFA Albrecht, PA-C  Physician Assistant Certified  Dept of Hematology/Oncology  PA-C to Dr. Box, Dr. Holliday and Dr. Figueroa    Route Chart for Scheduling    Med Onc Chart Routing      Follow up with physician 1 week. 4/5 in the afternoon, ok to book during hospital service   Follow up with HEIKE    Labs CMP, CBC and magnesium   Lab interval: once a week  Already scheduled   Imaging    Pharmacy appointment No pharmacy appointment needed      Other referrals No additional referrals needed         Treatment Plan Information   OP HEAD NECK CISPLATIN WEEKLY + RADIOTHERAPY   Christopher Jay MD   Upcoming Treatment Dates - OP HEAD NECK CISPLATIN WEEKLY + RADIOTHERAPY    4/6/2022       Chemotherapy       CISplatin (PLATINOL) 40 mg/m2 = 69 mg in sodium chloride 0.9% 500 mL chemo infusion       Pre-Hydration       sodium chloride 0.9% 1,000 mL with magnesium sulfate 1 g, potassium chloride 20 mEq infusion       Post-Hydration       sodium chloride 0.9% bolus 1,000 mL    Supportive Plan Information  IV FLUIDS AND ELECTROLYTES   Christopher Jay MD    Upcoming Treatment Dates - IV FLUIDS AND ELECTROLYTES    No upcoming days in selected categories.

## 2022-03-28 NOTE — Clinical Note
Hi team. I was wondering if we could schedule a f/u for the selected patient to come into see Dr. Smalls in regards to his trach? Please schedule at earliest availability. He is due to finish RT in mid-April and he has 2 more chemotherapy treatments, including this Wednesday and next Wednesday. Thank you

## 2022-03-29 PROCEDURE — 77014 HC CT GUIDANCE RADIATION THERAPY FLDS PLACEMENT: CPT | Mod: TC | Performed by: STUDENT IN AN ORGANIZED HEALTH CARE EDUCATION/TRAINING PROGRAM

## 2022-03-29 PROCEDURE — 77014 PR  CT GUIDANCE PLACEMENT RAD THERAPY FIELDS: CPT | Mod: 26,,, | Performed by: STUDENT IN AN ORGANIZED HEALTH CARE EDUCATION/TRAINING PROGRAM

## 2022-03-29 PROCEDURE — 77386 HC IMRT, COMPLEX: CPT | Performed by: STUDENT IN AN ORGANIZED HEALTH CARE EDUCATION/TRAINING PROGRAM

## 2022-03-29 PROCEDURE — 77014 PR  CT GUIDANCE PLACEMENT RAD THERAPY FIELDS: ICD-10-PCS | Mod: 26,,, | Performed by: STUDENT IN AN ORGANIZED HEALTH CARE EDUCATION/TRAINING PROGRAM

## 2022-03-29 RX ORDER — HEPARIN 100 UNIT/ML
500 SYRINGE INTRAVENOUS
Status: CANCELLED | OUTPATIENT
Start: 2022-03-30

## 2022-03-29 RX ORDER — SODIUM CHLORIDE 0.9 % (FLUSH) 0.9 %
10 SYRINGE (ML) INJECTION
Status: CANCELLED | OUTPATIENT
Start: 2022-03-30

## 2022-03-30 ENCOUNTER — INFUSION (OUTPATIENT)
Dept: INFUSION THERAPY | Facility: HOSPITAL | Age: 72
End: 2022-03-30
Payer: MEDICARE

## 2022-03-30 ENCOUNTER — PATIENT MESSAGE (OUTPATIENT)
Dept: HEMATOLOGY/ONCOLOGY | Facility: CLINIC | Age: 72
End: 2022-03-30
Payer: MEDICARE

## 2022-03-30 VITALS
OXYGEN SATURATION: 99 % | HEART RATE: 68 BPM | SYSTOLIC BLOOD PRESSURE: 119 MMHG | RESPIRATION RATE: 16 BRPM | DIASTOLIC BLOOD PRESSURE: 55 MMHG | TEMPERATURE: 98 F

## 2022-03-30 DIAGNOSIS — C02.9 SQUAMOUS CELL CANCER OF TONGUE: ICD-10-CM

## 2022-03-30 DIAGNOSIS — C77.0 SECONDARY MALIGNANT NEOPLASM OF CERVICAL LYMPH NODE: Primary | ICD-10-CM

## 2022-03-30 PROCEDURE — 96361 HYDRATE IV INFUSION ADD-ON: CPT

## 2022-03-30 PROCEDURE — 63600175 PHARM REV CODE 636 W HCPCS: Mod: JG | Performed by: INTERNAL MEDICINE

## 2022-03-30 PROCEDURE — 96360 HYDRATION IV INFUSION INIT: CPT

## 2022-03-30 PROCEDURE — 25000003 PHARM REV CODE 250: Performed by: INTERNAL MEDICINE

## 2022-03-30 PROCEDURE — 77014 PR  CT GUIDANCE PLACEMENT RAD THERAPY FIELDS: ICD-10-PCS | Mod: 26,,, | Performed by: STUDENT IN AN ORGANIZED HEALTH CARE EDUCATION/TRAINING PROGRAM

## 2022-03-30 PROCEDURE — 96367 TX/PROPH/DG ADDL SEQ IV INF: CPT

## 2022-03-30 PROCEDURE — 77014 PR  CT GUIDANCE PLACEMENT RAD THERAPY FIELDS: CPT | Mod: 26,,, | Performed by: STUDENT IN AN ORGANIZED HEALTH CARE EDUCATION/TRAINING PROGRAM

## 2022-03-30 PROCEDURE — 96413 CHEMO IV INFUSION 1 HR: CPT

## 2022-03-30 PROCEDURE — 77014 HC CT GUIDANCE RADIATION THERAPY FLDS PLACEMENT: CPT | Mod: TC | Performed by: STUDENT IN AN ORGANIZED HEALTH CARE EDUCATION/TRAINING PROGRAM

## 2022-03-30 PROCEDURE — 77386 HC IMRT, COMPLEX: CPT | Performed by: STUDENT IN AN ORGANIZED HEALTH CARE EDUCATION/TRAINING PROGRAM

## 2022-03-30 PROCEDURE — 96365 THER/PROPH/DIAG IV INF INIT: CPT

## 2022-03-30 RX ORDER — HEPARIN 100 UNIT/ML
500 SYRINGE INTRAVENOUS
Status: DISCONTINUED | OUTPATIENT
Start: 2022-03-30 | End: 2022-03-30 | Stop reason: HOSPADM

## 2022-03-30 RX ORDER — SODIUM CHLORIDE 0.9 % (FLUSH) 0.9 %
10 SYRINGE (ML) INJECTION
Status: DISCONTINUED | OUTPATIENT
Start: 2022-03-30 | End: 2022-03-30 | Stop reason: HOSPADM

## 2022-03-30 RX ADMIN — SODIUM CHLORIDE 1000 ML: 0.9 INJECTION, SOLUTION INTRAVENOUS at 11:03

## 2022-03-30 RX ADMIN — PALONOSETRON HYDROCHLORIDE 0.25 MG: 0.25 INJECTION, SOLUTION INTRAVENOUS at 10:03

## 2022-03-30 RX ADMIN — CISPLATIN 69 MG: 1 INJECTION INTRAVENOUS at 10:03

## 2022-03-30 RX ADMIN — MAGNESIUM SULFATE HEPTAHYDRATE 500 ML/HR: 500 INJECTION, SOLUTION INTRAMUSCULAR; INTRAVENOUS at 08:03

## 2022-03-30 RX ADMIN — APREPITANT 130 MG: 130 INJECTION, EMULSION INTRAVENOUS at 10:03

## 2022-03-30 NOTE — PLAN OF CARE
0800  Patient wheeled into treatment area from waiting area.  Transferred from wheel chair to chair with stand by assist. VSS, Assessment done.  PIV inserted flushed, blood return noted.  Started on pre-hydration , 1 lt NS /1 g Mag/ 20 mEq K at 500 cc/hr per MD orders.  WCTM for safety

## 2022-03-30 NOTE — PLAN OF CARE
1400  Patient completed pre-hydration, cisplatin, and post hydration infusions, tolerated well.  PIV discontinued without issue.  Patient transferred from chair to wheel chair with stand by assist.  RTC 4/6/22  patient's wife wheeled patient off floor to the radiation appt.

## 2022-04-01 ENCOUNTER — HOSPITAL ENCOUNTER (OUTPATIENT)
Dept: RADIATION THERAPY | Facility: HOSPITAL | Age: 72
Discharge: HOME | End: 2022-04-01
Attending: STUDENT IN AN ORGANIZED HEALTH CARE EDUCATION/TRAINING PROGRAM
Payer: MEDICARE

## 2022-04-01 PROCEDURE — 77014 PR  CT GUIDANCE PLACEMENT RAD THERAPY FIELDS: CPT | Mod: 26,,, | Performed by: STUDENT IN AN ORGANIZED HEALTH CARE EDUCATION/TRAINING PROGRAM

## 2022-04-01 PROCEDURE — 77014 HC CT GUIDANCE RADIATION THERAPY FLDS PLACEMENT: CPT | Mod: TC | Performed by: STUDENT IN AN ORGANIZED HEALTH CARE EDUCATION/TRAINING PROGRAM

## 2022-04-01 PROCEDURE — 77014 PR  CT GUIDANCE PLACEMENT RAD THERAPY FIELDS: ICD-10-PCS | Mod: 26,,, | Performed by: STUDENT IN AN ORGANIZED HEALTH CARE EDUCATION/TRAINING PROGRAM

## 2022-04-01 PROCEDURE — 77336 RADIATION PHYSICS CONSULT: CPT | Performed by: STUDENT IN AN ORGANIZED HEALTH CARE EDUCATION/TRAINING PROGRAM

## 2022-04-01 PROCEDURE — 77386 HC IMRT, COMPLEX: CPT | Performed by: STUDENT IN AN ORGANIZED HEALTH CARE EDUCATION/TRAINING PROGRAM

## 2022-04-04 ENCOUNTER — DOCUMENTATION ONLY (OUTPATIENT)
Dept: RADIATION THERAPY | Facility: HOSPITAL | Age: 72
End: 2022-04-04
Payer: MEDICARE

## 2022-04-04 ENCOUNTER — DOCUMENTATION ONLY (OUTPATIENT)
Dept: RADIATION ONCOLOGY | Facility: CLINIC | Age: 72
End: 2022-04-04
Payer: MEDICARE

## 2022-04-04 ENCOUNTER — LAB VISIT (OUTPATIENT)
Dept: LAB | Facility: HOSPITAL | Age: 72
End: 2022-04-04
Attending: STUDENT IN AN ORGANIZED HEALTH CARE EDUCATION/TRAINING PROGRAM
Payer: MEDICARE

## 2022-04-04 ENCOUNTER — OFFICE VISIT (OUTPATIENT)
Dept: HEMATOLOGY/ONCOLOGY | Facility: CLINIC | Age: 72
End: 2022-04-04
Payer: MEDICARE

## 2022-04-04 VITALS
DIASTOLIC BLOOD PRESSURE: 60 MMHG | SYSTOLIC BLOOD PRESSURE: 123 MMHG | WEIGHT: 139 LBS | RESPIRATION RATE: 16 BRPM | HEIGHT: 68 IN | OXYGEN SATURATION: 98 % | BODY MASS INDEX: 21.07 KG/M2 | TEMPERATURE: 98 F | HEART RATE: 71 BPM

## 2022-04-04 DIAGNOSIS — Z99.81 CHRONIC RESPIRATORY FAILURE WITH HYPOXIA, ON HOME O2 THERAPY: ICD-10-CM

## 2022-04-04 DIAGNOSIS — D64.9 NORMOCYTIC ANEMIA: ICD-10-CM

## 2022-04-04 DIAGNOSIS — C77.0 SECONDARY MALIGNANT NEOPLASM OF CERVICAL LYMPH NODE: ICD-10-CM

## 2022-04-04 DIAGNOSIS — D84.821 IMMUNODEFICIENCY DUE TO DRUG THERAPY: ICD-10-CM

## 2022-04-04 DIAGNOSIS — C02.9 SQUAMOUS CELL CANCER OF TONGUE: Primary | ICD-10-CM

## 2022-04-04 DIAGNOSIS — J96.11 CHRONIC RESPIRATORY FAILURE WITH HYPOXIA, ON HOME O2 THERAPY: ICD-10-CM

## 2022-04-04 DIAGNOSIS — Z79.899 IMMUNODEFICIENCY DUE TO DRUG THERAPY: ICD-10-CM

## 2022-04-04 DIAGNOSIS — C02.9 SQUAMOUS CELL CANCER OF TONGUE: ICD-10-CM

## 2022-04-04 LAB
ALBUMIN SERPL BCP-MCNC: 2.8 G/DL (ref 3.5–5.2)
ALP SERPL-CCNC: 84 U/L (ref 55–135)
ALT SERPL W/O P-5'-P-CCNC: 14 U/L (ref 10–44)
ANION GAP SERPL CALC-SCNC: 5 MMOL/L (ref 8–16)
AST SERPL-CCNC: 15 U/L (ref 10–40)
BASOPHILS # BLD AUTO: 0.02 K/UL (ref 0–0.2)
BASOPHILS NFR BLD: 0.5 % (ref 0–1.9)
BILIRUB SERPL-MCNC: 0.2 MG/DL (ref 0.1–1)
BUN SERPL-MCNC: 22 MG/DL (ref 8–23)
CALCIUM SERPL-MCNC: 9.6 MG/DL (ref 8.7–10.5)
CHLORIDE SERPL-SCNC: 88 MMOL/L (ref 95–110)
CO2 SERPL-SCNC: 37 MMOL/L (ref 23–29)
CREAT SERPL-MCNC: 0.6 MG/DL (ref 0.5–1.4)
DIFFERENTIAL METHOD: ABNORMAL
EOSINOPHIL # BLD AUTO: 0 K/UL (ref 0–0.5)
EOSINOPHIL NFR BLD: 0.8 % (ref 0–8)
ERYTHROCYTE [DISTWIDTH] IN BLOOD BY AUTOMATED COUNT: 15.9 % (ref 11.5–14.5)
EST. GFR  (AFRICAN AMERICAN): >60 ML/MIN/1.73 M^2
EST. GFR  (NON AFRICAN AMERICAN): >60 ML/MIN/1.73 M^2
GLUCOSE SERPL-MCNC: 166 MG/DL (ref 70–110)
HCT VFR BLD AUTO: 27.9 % (ref 40–54)
HGB BLD-MCNC: 8.7 G/DL (ref 14–18)
IMM GRANULOCYTES # BLD AUTO: 0.03 K/UL (ref 0–0.04)
IMM GRANULOCYTES NFR BLD AUTO: 0.8 % (ref 0–0.5)
LYMPHOCYTES # BLD AUTO: 0.4 K/UL (ref 1–4.8)
LYMPHOCYTES NFR BLD: 9.9 % (ref 18–48)
MAGNESIUM SERPL-MCNC: 2.1 MG/DL (ref 1.6–2.6)
MCH RBC QN AUTO: 28.7 PG (ref 27–31)
MCHC RBC AUTO-ENTMCNC: 31.2 G/DL (ref 32–36)
MCV RBC AUTO: 92 FL (ref 82–98)
MONOCYTES # BLD AUTO: 0.4 K/UL (ref 0.3–1)
MONOCYTES NFR BLD: 10.5 % (ref 4–15)
NEUTROPHILS # BLD AUTO: 2.9 K/UL (ref 1.8–7.7)
NEUTROPHILS NFR BLD: 77.5 % (ref 38–73)
NRBC BLD-RTO: 0 /100 WBC
PLATELET # BLD AUTO: 198 K/UL (ref 150–450)
PMV BLD AUTO: 8.9 FL (ref 9.2–12.9)
POTASSIUM SERPL-SCNC: 5.1 MMOL/L (ref 3.5–5.1)
PROT SERPL-MCNC: 6.1 G/DL (ref 6–8.4)
RBC # BLD AUTO: 3.03 M/UL (ref 4.6–6.2)
SODIUM SERPL-SCNC: 130 MMOL/L (ref 136–145)
WBC # BLD AUTO: 3.72 K/UL (ref 3.9–12.7)

## 2022-04-04 PROCEDURE — 85025 COMPLETE CBC W/AUTO DIFF WBC: CPT | Performed by: STUDENT IN AN ORGANIZED HEALTH CARE EDUCATION/TRAINING PROGRAM

## 2022-04-04 PROCEDURE — 77014 HC CT GUIDANCE RADIATION THERAPY FLDS PLACEMENT: CPT | Mod: TC | Performed by: STUDENT IN AN ORGANIZED HEALTH CARE EDUCATION/TRAINING PROGRAM

## 2022-04-04 PROCEDURE — 3074F SYST BP LT 130 MM HG: CPT | Mod: CPTII,S$GLB,, | Performed by: STUDENT IN AN ORGANIZED HEALTH CARE EDUCATION/TRAINING PROGRAM

## 2022-04-04 PROCEDURE — 1126F AMNT PAIN NOTED NONE PRSNT: CPT | Mod: CPTII,S$GLB,, | Performed by: STUDENT IN AN ORGANIZED HEALTH CARE EDUCATION/TRAINING PROGRAM

## 2022-04-04 PROCEDURE — 1101F PR PT FALLS ASSESS DOC 0-1 FALLS W/OUT INJ PAST YR: ICD-10-PCS | Mod: CPTII,S$GLB,, | Performed by: STUDENT IN AN ORGANIZED HEALTH CARE EDUCATION/TRAINING PROGRAM

## 2022-04-04 PROCEDURE — 3288F PR FALLS RISK ASSESSMENT DOCUMENTED: ICD-10-PCS | Mod: CPTII,S$GLB,, | Performed by: STUDENT IN AN ORGANIZED HEALTH CARE EDUCATION/TRAINING PROGRAM

## 2022-04-04 PROCEDURE — 99215 PR OFFICE/OUTPT VISIT, EST, LEVL V, 40-54 MIN: ICD-10-PCS | Mod: S$GLB,,, | Performed by: STUDENT IN AN ORGANIZED HEALTH CARE EDUCATION/TRAINING PROGRAM

## 2022-04-04 PROCEDURE — 99215 OFFICE O/P EST HI 40 MIN: CPT | Mod: S$GLB,,, | Performed by: STUDENT IN AN ORGANIZED HEALTH CARE EDUCATION/TRAINING PROGRAM

## 2022-04-04 PROCEDURE — 99999 PR PBB SHADOW E&M-EST. PATIENT-LVL V: ICD-10-PCS | Mod: PBBFAC,,, | Performed by: STUDENT IN AN ORGANIZED HEALTH CARE EDUCATION/TRAINING PROGRAM

## 2022-04-04 PROCEDURE — 3078F DIAST BP <80 MM HG: CPT | Mod: CPTII,S$GLB,, | Performed by: STUDENT IN AN ORGANIZED HEALTH CARE EDUCATION/TRAINING PROGRAM

## 2022-04-04 PROCEDURE — 1126F PR PAIN SEVERITY QUANTIFIED, NO PAIN PRESENT: ICD-10-PCS | Mod: CPTII,S$GLB,, | Performed by: STUDENT IN AN ORGANIZED HEALTH CARE EDUCATION/TRAINING PROGRAM

## 2022-04-04 PROCEDURE — 80053 COMPREHEN METABOLIC PANEL: CPT | Performed by: STUDENT IN AN ORGANIZED HEALTH CARE EDUCATION/TRAINING PROGRAM

## 2022-04-04 PROCEDURE — 1159F MED LIST DOCD IN RCRD: CPT | Mod: CPTII,S$GLB,, | Performed by: STUDENT IN AN ORGANIZED HEALTH CARE EDUCATION/TRAINING PROGRAM

## 2022-04-04 PROCEDURE — 3008F BODY MASS INDEX DOCD: CPT | Mod: CPTII,S$GLB,, | Performed by: STUDENT IN AN ORGANIZED HEALTH CARE EDUCATION/TRAINING PROGRAM

## 2022-04-04 PROCEDURE — 36415 COLL VENOUS BLD VENIPUNCTURE: CPT | Performed by: STUDENT IN AN ORGANIZED HEALTH CARE EDUCATION/TRAINING PROGRAM

## 2022-04-04 PROCEDURE — 3288F FALL RISK ASSESSMENT DOCD: CPT | Mod: CPTII,S$GLB,, | Performed by: STUDENT IN AN ORGANIZED HEALTH CARE EDUCATION/TRAINING PROGRAM

## 2022-04-04 PROCEDURE — 99999 PR PBB SHADOW E&M-EST. PATIENT-LVL V: CPT | Mod: PBBFAC,,, | Performed by: STUDENT IN AN ORGANIZED HEALTH CARE EDUCATION/TRAINING PROGRAM

## 2022-04-04 PROCEDURE — 83735 ASSAY OF MAGNESIUM: CPT | Performed by: STUDENT IN AN ORGANIZED HEALTH CARE EDUCATION/TRAINING PROGRAM

## 2022-04-04 PROCEDURE — 77014 PR  CT GUIDANCE PLACEMENT RAD THERAPY FIELDS: CPT | Mod: 26,,, | Performed by: STUDENT IN AN ORGANIZED HEALTH CARE EDUCATION/TRAINING PROGRAM

## 2022-04-04 PROCEDURE — 3078F PR MOST RECENT DIASTOLIC BLOOD PRESSURE < 80 MM HG: ICD-10-PCS | Mod: CPTII,S$GLB,, | Performed by: STUDENT IN AN ORGANIZED HEALTH CARE EDUCATION/TRAINING PROGRAM

## 2022-04-04 PROCEDURE — 4010F ACE/ARB THERAPY RXD/TAKEN: CPT | Mod: CPTII,S$GLB,, | Performed by: STUDENT IN AN ORGANIZED HEALTH CARE EDUCATION/TRAINING PROGRAM

## 2022-04-04 PROCEDURE — 3008F PR BODY MASS INDEX (BMI) DOCUMENTED: ICD-10-PCS | Mod: CPTII,S$GLB,, | Performed by: STUDENT IN AN ORGANIZED HEALTH CARE EDUCATION/TRAINING PROGRAM

## 2022-04-04 PROCEDURE — 1101F PT FALLS ASSESS-DOCD LE1/YR: CPT | Mod: CPTII,S$GLB,, | Performed by: STUDENT IN AN ORGANIZED HEALTH CARE EDUCATION/TRAINING PROGRAM

## 2022-04-04 PROCEDURE — 3074F PR MOST RECENT SYSTOLIC BLOOD PRESSURE < 130 MM HG: ICD-10-PCS | Mod: CPTII,S$GLB,, | Performed by: STUDENT IN AN ORGANIZED HEALTH CARE EDUCATION/TRAINING PROGRAM

## 2022-04-04 PROCEDURE — 77386 HC IMRT, COMPLEX: CPT | Performed by: STUDENT IN AN ORGANIZED HEALTH CARE EDUCATION/TRAINING PROGRAM

## 2022-04-04 PROCEDURE — 77014 PR  CT GUIDANCE PLACEMENT RAD THERAPY FIELDS: ICD-10-PCS | Mod: 26,,, | Performed by: STUDENT IN AN ORGANIZED HEALTH CARE EDUCATION/TRAINING PROGRAM

## 2022-04-04 PROCEDURE — 1159F PR MEDICATION LIST DOCUMENTED IN MEDICAL RECORD: ICD-10-PCS | Mod: CPTII,S$GLB,, | Performed by: STUDENT IN AN ORGANIZED HEALTH CARE EDUCATION/TRAINING PROGRAM

## 2022-04-04 PROCEDURE — 4010F PR ACE/ARB THEARPY RXD/TAKEN: ICD-10-PCS | Mod: CPTII,S$GLB,, | Performed by: STUDENT IN AN ORGANIZED HEALTH CARE EDUCATION/TRAINING PROGRAM

## 2022-04-04 RX ORDER — HEPARIN 100 UNIT/ML
500 SYRINGE INTRAVENOUS
Status: CANCELLED | OUTPATIENT
Start: 2022-04-06

## 2022-04-04 RX ORDER — SODIUM CHLORIDE 0.9 % (FLUSH) 0.9 %
10 SYRINGE (ML) INJECTION
Status: CANCELLED | OUTPATIENT
Start: 2022-04-06

## 2022-04-04 NOTE — PROGRESS NOTES
Oncology Nutrition  Radiation Oncology Weekly Check     Treatment Week: 5 (Day 22)    Fareed Richard JrMalia   1950    Reason for Visit: Pt here for weekly visit due to high nutritional risk radiation therapy treatment (squamous cell carcinoma of the oral tongue)    Nutrition Assessment    Patient reports that he continues to tolerate his treatment well. He has one sore on lower lip otherwise denies any significant mucositis. No other complaints. He continues tube feedings of 6 cartons of Isosource 1.5 daily. His PEG tube is clean and patent. Insertion site is also clean and skin is intact.    Weight:  139.7lb  Height: 68in    Usual BW: 162lb  Weight Change: stable/slight increase     Nutrition Impact Symptoms    Dysphagia   Constipation (improved with Mariajose Lax)     Nutrition Recommendations    Continue current tube feeding regimen as this is allowing for adequate weight gain   Magic mouthwash as needed for mucositis     Follow up weekly or as needed during treatment       Elida Rene, MPH, RD, , LDN, FAND   [phone]

## 2022-04-04 NOTE — ASSESSMENT & PLAN NOTE
Stage IVB squamous cell carcinoma of the tongue with contralateral cervical lymph node involvement, +margins, +SALINAS (microscopic).  S/p peg tube placement.    -labs reviewed; ok to proceed with cisplatin.  -discussed 7th week of chemotherapy with the patient as the expected end date of RT is ~4/18/22.  He does not want any more chemotherapy after this upcoming cycle.  He understands that standard of care would be to continue chemotherapy as long as he is receiving radiation.    -support medications:  zofran ODT prn nausea. Glycopyrrolate prn secretions. Magic mouthwash for mucositis from chemoRT  -4 week post-treatment CT scan and follow up with me in May

## 2022-04-04 NOTE — PROGRESS NOTES
PATIENT: Fareed Richard Jr.  MRN: [id number]  DATE: 4/4/2022      Diagnosis:   1. Squamous cell cancer of tongue    2. Secondary malignant neoplasm of cervical lymph node    3. Normocytic anemia    4. Immunodeficiency due to drug therapy    5. Chronic respiratory failure with hypoxia, on home O2 therapy        Chief Complaint: Squamous cell cancer of tongue      Oncologic History:    Oncologic History 1. Squamous cell carcinoma of tongue    Oncologic Treatment 1.  1/4/22 total glossectomy, bilateral neck dissection, bilateral cervical facial advancement flaps and anterolateral thigh free flap reconstruction of his glossectomy defect   2. 2/28/22 adjuvant chemoradiation with cisplatin    Pathology Squamous cell carcinoma, poorly differentiated, p16 negative  Final pathology: pT4a:  Moderately advanced local disease.  Tumor size = 6.0 cm with DOI = 29 mm and invades adjacent structures.   pN3b:  Metastasis in single contralateral node, SALINAS(+).   pM:  Not applicable.   Superior soft tissue margin of the left neck is involved by tumor           Subjective:    Interval History: Mr. Richard is here for follow up    Oncological History copied from medical chart: Initially saw Aletha Cook NP (ENT) 11/29/21 regarding non-healing left sided tongue lesion for about 6 weeks.  Associated symptoms included tongue pain, bleeding, left otalgia, weight loss from difficulty eating, and bump under left chin that enlarged and was refractory to oral antibiotics and nystatin.   History significant for skin cancer of the left cheek and right lower lip about 5-6 years ago. He reported he had a flap done with Dr. Starks. His wife notes that one of the cancers was SCC and the other was basal cell but she cannot call which was which. 12/6/21 CT neck was done (findings below). 12/15/21 he saw Dr. Smalls who performed an FNA of his left neck mass, which finalized as squamous cell carcinoma, p16 negative.   12/16/21 CT chest without contrast was  done (findings below).  1/4/22 underwent total glossectomy, bilateral neck dissection, bilateral cervical facial advancement flaps and anterolateral thigh free flap reconstruction of his glossectomy defect.  Final pathology [id number], +SALINAS (microscopic), +margin (superior soft tissue margin of left neck).    He has completed 5 weeks of chemoradiation.     Interval History:   Since his last visit, he felt wiped out after chemo so he missed some radiation treatment days that will be added on later this month.  Glycopyrrolate was too strong and dried everything out.   administered saline in his trach, which really helped him clear mucus and also improved his breathing and oxygenation (per wife report).  Lip sore/blister is stable and he has been applying duke's solution. Patient denies any other chemo specific toxicities.  Patient is not interested in a 7th week of chemo    He has a peg tube and Using 6 cartons of Isosource daily. His PEG tube is clean and patent. Insertion site is also clean and skin is intact.  He denies any issues getting peripheral IVs or labs.  Tube feed dependent; all nutrition is via peg tube. S/p glossectomy.    Former smoker, 110 pack years.    Past Medical History:   Past Medical History:   Diagnosis Date    Cancer     skin to Rt forearm and face    COPD (chronic obstructive pulmonary disease)     Hyperlipidemia     Hypertension     Pseudoaneurysm        Past Surgical HIstory:   Past Surgical History:   Procedure Laterality Date    ABDOMINAL SURGERY      stents placed in liver and large intestines, per patient    CAROTID STENT      CORONARY STENT PLACEMENT  01/2000    DISSECTION OF NECK Bilateral 1/4/2022    Procedure: DISSECTION, NECK;  Surgeon: Naeem Smalls MD;  Location: [facility] OR [address] [place] [address];  Service: ENT;  Laterality: Bilateral;    ESOPHAGOGASTRODUODENOSCOPY W/ PEG N/A 1/4/2022    Procedure: EGD, WITH PEG TUBE INSERTION;  Surgeon: Anthony Calabrese MD;  Location: [facility] OR  2ND FLR;  Service: General;  Laterality: N/A;    EYE SURGERY      Cataract bilateral    femoral stents      bilateral    FLAP PROCEDURE N/A 1/3/2022    Procedure: CREATION, FREE FLAP;  Surgeon: Naeem Smalls MD;  Location: [facility] OR 2ND FLR;  Service: ENT;  Laterality: N/A;    FLAP PROCEDURE Right 1/4/2022    Procedure: CREATION, FREE FLAP;  Surgeon: Stacey Conde MD;  Location: [facility] OR [facility] FLR;  Service: ENT;  Laterality: Right;  Ischemic start 1203  Ischemic stop 1353    GLOSSECTOMY N/A 1/4/2022    Procedure: GLOSSECTOMY;  Surgeon: Naeem Smalls MD;  Location: [facility] OR [facility] FLR;  Service: ENT;  Laterality: N/A;    RADICAL NECK DISSECTION Left 1/3/2022    Procedure: DISSECTION, NECK, RADICAL;  Surgeon: Naeem Smalls MD;  [facility];  Service: ENT;  Laterality: Left;    SKIN CANCER EXCISION      TRACHEOTOMY N/A 1/4/2022    Procedure: TRACHEOTOMY;  Surgeon: Naeem Smalls MD;  [facility];  Service: ENT;  Laterality: N/A;       Family History: No family history on file.    Social History:  reports that he quit smoking about 3 years ago. His smoking use included cigarettes. He started smoking about 59 years ago. He has a 80.00 pack-year smoking history. He has never used smokeless tobacco. He reports current alcohol use of about 3.0 standard drinks of alcohol per week. He reports that he does not use drugs.    Allergies:  Review of patient's allergies indicates:  No Known Allergies    Medications:  Current Outpatient Medications   Medication Sig Dispense Refill    acetaminophen (TYLENOL) 325 MG tablet 2 tablets (650 mg total) by Per G Tube route every 6 (six) hours.  0    albuterol-ipratropium (DUO-NEB) 2.5 mg-0.5 mg/3 mL nebulizer solution Take 3 mLs by nebulization every 4 (four) hours as needed for Wheezing. Rescue 75 mL 0    amLODIPine (NORVASC) 10 MG tablet Take 10 mg by mouth once daily.      aspirin 81 MG Chew 1 tablet (81 mg total) by Per G Tube  route once daily.  0    atorvastatin (LIPITOR) 20 MG tablet 1 tablet (20 mg total) by Per G Tube route once daily. 90 tablet 3    bisacodyL (DULCOLAX) 10 mg Supp Place 1 suppository (10 mg total) rectally daily as needed.  0    clopidogreL (PLAVIX) 75 mg tablet 1 tablet (75 mg total) by Per G Tube route once daily. 30 tablet 11    duke's soln (benadryl 30 mL, mylanta 30 mL, LIDOcaine 30 mL, nystatin 30 mL) 120mL Take 10 mLs by mouth 4 (four) times daily as needed (mucositis). 360 mL 0    folic acid (FOLVITE) 1 MG tablet 1 tablet (1 mg total) by Per G Tube route once daily.  0    glycopyrrolate (CUVPOSA) 1 mg/5 mL (0.2 mg/mL) Soln Take 5 mLs (1 mg total) by mouth 3 (three) times daily as needed (TO REDUCE SECRETIONS). 473 mL 1    guaiFENesin 200 mg/5 mL Liqd Take 400 mg by mouth every 4 (four) hours as needed (for secretions). 473 mL 3    losartan (COZAAR) 50 MG tablet 1 tablet (50 mg total) by Per G Tube route once daily. 90 tablet 3    metoprolol tartrate (LOPRESSOR) 100 MG tablet 1 tablet (100 mg total) by Per G Tube route 2 (two) times daily. 60 tablet 11    ondansetron (ZOFRAN-ODT) 8 MG TbDL Take 1 tablet (8 mg total) by mouth every 8 (eight) hours as needed (nausea). 30 tablet 1    oxyCODONE (ROXICODONE) 5 mg/5 mL Soln 5 mLs (5 mg total) by Per G Tube route every 4 (four) hours as needed.  0    sodium chloride 3% 3 % nebulizer solution Take 4 mLs by nebulization every 8 (eight) hours.      enoxaparin (LOVENOX) 40 mg/0.4 mL Syrg Inject 0.4 mLs (40 mg total) into the skin once daily. (Patient not taking: No sig reported)      melatonin (MELATIN) 3 mg tablet 2 tablets (6 mg total) by Per G Tube route nightly. (Patient not taking: No sig reported)  0    polyethylene glycol (GLYCOLAX) 17 gram PwPk 17 g by Per G Tube route 2 (two) times daily. (Patient not taking: No sig reported)  0    senna-docusate 8.6-50 mg (PERICOLACE) 8.6-50 mg per tablet 1 tablet by Per G Tube route 2 (two) times daily.  "(Patient not taking: No sig reported)      sodium chloride (OCEAN) 0.65 % nasal spray 1 spray by Nasal route as needed for Congestion. (Patient not taking: No sig reported)  12    thiamine 100 MG tablet 1 tablet (100 mg total) by Per G Tube route once daily. (Patient not taking: No sig reported)       No current facility-administered medications for this visit.       Review of Systems   Constitutional: Positive for fatigue. Negative for activity change, appetite change, chills, diaphoresis, fever and unexpected weight change.   HENT: Positive for mouth sores, trouble swallowing and voice change. Negative for ear pain and nosebleeds.    Eyes: Negative for visual disturbance.   Respiratory: Positive for cough and shortness of breath. Negative for chest tightness and wheezing.    Cardiovascular: Negative for chest pain and leg swelling.   Gastrointestinal: Negative for abdominal distention, abdominal pain, blood in stool, constipation, diarrhea, nausea and vomiting.   Endocrine: Negative for cold intolerance and heat intolerance.   Genitourinary: Negative for difficulty urinating and dysuria.   Musculoskeletal: Negative for arthralgias and back pain.   Skin: Negative for color change.   Neurological: Positive for weakness. Negative for dizziness, light-headedness, numbness and headaches.   Hematological: Negative for adenopathy. Does not bruise/bleed easily.   Psychiatric/Behavioral: Negative for confusion.       ECOG Performance Status:     ECOG SCORE    2 - Capable of all selfcare but unable to carry out any work activities, active > 50% of hours         Objective:      Vitals:   Vitals:    04/04/22 1538   BP: 123/60   BP Location: Left arm   Patient Position: Sitting   BP Method: Medium (Automatic)   Pulse: 71   Resp: 16   Temp: 98 °F (36.7 °C)   TempSrc: Oral   SpO2: 98%   Weight: 63 kg (139 lb)   Height: 5' 8" (1.727 m)     BMI: Body mass index is 21.13 kg/m².    Physical Exam  Constitutional:       General: He " is not in acute distress.     Appearance: Normal appearance. He is not ill-appearing.   HENT:      Head: Normocephalic and atraumatic.      Mouth/Throat:      Pharynx: No oropharyngeal exudate or posterior oropharyngeal erythema.      Comments: +lip blister, stable  Eyes:      General: No scleral icterus.     Extraocular Movements: Extraocular movements intact.      Conjunctiva/sclera: Conjunctivae normal.      Pupils: Pupils are equal, round, and reactive to light.   Neck:      Comments: +trach  Cardiovascular:      Rate and Rhythm: Normal rate and regular rhythm.      Heart sounds: No murmur heard.    No friction rub. No gallop.   Pulmonary:      Effort: Pulmonary effort is normal. No respiratory distress.      Breath sounds: No stridor. No wheezing, rhonchi or rales.      Comments: Diminished breath sounds all lung fields  Abdominal:      General: Bowel sounds are normal. There is no distension.      Palpations: Abdomen is soft. There is no mass.      Tenderness: There is no abdominal tenderness. There is no guarding or rebound.      Comments: +peg tube site c/d/i   Musculoskeletal:         General: Normal range of motion.      Cervical back: Normal range of motion and neck supple.      Right lower leg: No edema.      Left lower leg: No edema.   Skin:     General: Skin is warm and dry.      Findings: No erythema.   Neurological:      General: No focal deficit present.      Mental Status: He is alert.      Motor: Weakness present.         Laboratory Data:   Recent Results (from the past 168 hour(s))   CBC Auto Differential    Collection Time: 04/04/22  2:53 PM   Result Value Ref Range    WBC 3.72 (L) 3.90 - 12.70 K/uL    RBC 3.03 (L) 4.60 - 6.20 M/uL    Hemoglobin 8.7 (L) 14.0 - 18.0 g/dL    Hematocrit 27.9 (L) 40.0 - 54.0 %    MCV 92 82 - 98 fL    MCH 28.7 27.0 - 31.0 pg    MCHC 31.2 (L) 32.0 - 36.0 g/dL    RDW 15.9 (H) 11.5 - 14.5 %    Platelets 198 150 - 450 K/uL    MPV 8.9 (L) 9.2 - 12.9 fL    Immature  Granulocytes 0.8 (H) 0.0 - 0.5 %    Gran # (ANC) 2.9 1.8 - 7.7 K/uL    Immature Grans (Abs) 0.03 0.00 - 0.04 K/uL    Lymph # 0.4 (L) 1.0 - 4.8 K/uL    Mono # 0.4 0.3 - 1.0 K/uL    Eos # 0.0 0.0 - 0.5 K/uL    Baso # 0.02 0.00 - 0.20 K/uL    nRBC 0 0 /100 WBC    Gran % 77.5 (H) 38.0 - 73.0 %    Lymph % 9.9 (L) 18.0 - 48.0 %    Mono % 10.5 4.0 - 15.0 %    Eosinophil % 0.8 0.0 - 8.0 %    Basophil % 0.5 0.0 - 1.9 %    Differential Method Automated    Comprehensive Metabolic Panel    Collection Time: 04/04/22  2:53 PM   Result Value Ref Range    Sodium 130 (L) 136 - 145 mmol/L    Potassium 5.1 3.5 - 5.1 mmol/L    Chloride 88 (L) 95 - 110 mmol/L    CO2 37 (H) 23 - 29 mmol/L    Glucose 166 (H) 70 - 110 mg/dL    BUN 22 8 - 23 mg/dL    Creatinine 0.6 0.5 - 1.4 mg/dL    Calcium 9.6 8.7 - 10.5 mg/dL    Total Protein 6.1 6.0 - 8.4 g/dL    Albumin 2.8 (L) 3.5 - 5.2 g/dL    Total Bilirubin 0.2 0.1 - 1.0 mg/dL    Alkaline Phosphatase 84 55 - 135 U/L    AST 15 10 - 40 U/L    ALT 14 10 - 44 U/L    Anion Gap 5 (L) 8 - 16 mmol/L    eGFR if African American >60.0 >60 mL/min/1.73 m^2    eGFR if non African American >60.0 >60 mL/min/1.73 m^2   Magnesium    Collection Time: 04/04/22  2:53 PM   Result Value Ref Range    Magnesium 2.1 1.6 - 2.6 mg/dL       Imaging:         Assessment:       1. Squamous cell cancer of tongue    2. Secondary malignant neoplasm of cervical lymph node    3. Normocytic anemia    4. Immunodeficiency due to drug therapy    5. Chronic respiratory failure with hypoxia, on home O2 therapy           Plan:       Problem List Items Addressed This Visit        Pulmonary    Chronic respiratory failure with hypoxia, on home O2 therapy    Current Assessment & Plan     Improved compared to last week, O2 sat 98% on 2L NC.  Wife reports HH administered saline into the trach Saturday, which helped him clear his airway.  -continue to monitor  -ok to get HH to administer saline more frequently throughout the week if needed.               Oncology    Squamous cell cancer of tongue - Primary    Overview     12/15/21 FNA left neck mass : squamous cell carcinoma, p16 negative  1/4/22 total glossectomy, bilateral neck dissection, bilateral cervical facial advancement flaps and anterolateral thigh free flap reconstruction of his glossectomy defect.  Final path :  [id number] (+microscopic SALINAS, single contralateral node), superior soft tissue margin of left neck with tumor.    2/28/22 start chemoradiation           Current Assessment & Plan     Stage IVB squamous cell carcinoma of the tongue with contralateral cervical lymph node involvement, +margins, +SALINAS (microscopic).  S/p peg tube placement.    -labs reviewed; ok to proceed with cisplatin.  -discussed 7th week of chemotherapy with the patient as the expected end date of RT is ~4/18/22.  He does not want any more chemotherapy after this upcoming cycle.  He understands that standard of care would be to continue chemotherapy as long as he is receiving radiation.    -support medications:  zofran ODT prn nausea. Glycopyrrolate prn secretions. Magic mouthwash for mucositis from chemoRT  -4 week post-treatment CT scan and follow up with me in May           Normocytic anemia    Current Assessment & Plan     Hemoglobin stable  -monitor cbc           Secondary malignant neoplasm of cervical lymph node    Overview     See sq.c.c. tongue              Other    Immunodeficiency due to drug therapy    Overview     Secondary to chemotherapy           Current Assessment & Plan     Afebrile, anc sufficient for treatment  -monitor cbc                 Route Chart for Scheduling    Med Onc Chart Routing      Follow up with physician Other. 5/19 morning appointment preferred   Follow up with HEIKE    Labs CMP and CBC   Lab interval:  5/17 labs prior to scan   Imaging Other   5/17 ct neck   Pharmacy appointment No pharmacy appointment needed      Other referrals No additional referrals needed         Treatment Plan  Information   OP HEAD NECK CISPLATIN WEEKLY + RADIOTHERAPY   Christopher Jay MD   Upcoming Treatment Dates - OP HEAD NECK CISPLATIN WEEKLY + RADIOTHERAPY    4/6/2022       Chemotherapy       CISplatin (PLATINOL) 40 mg/m2 = 70 mg in sodium chloride 0.9% 500 mL chemo infusion       Pre-Hydration       sodium chloride 0.9% 1,000 mL with magnesium sulfate 1 g infusion       Post-Hydration       sodium chloride 0.9% bolus 1,000 mL  4/13/2022       Chemotherapy       CISplatin (PLATINOL) 40 mg/m2 = 70 mg in sodium chloride 0.9% 500 mL chemo infusion       Pre-Hydration       sodium chloride 0.9% 1,000 mL with magnesium sulfate 1 g, potassium chloride 20 mEq infusion       Post-Hydration       sodium chloride 0.9% bolus 1,000 mL    Supportive Plan Information  IV FLUIDS AND ELECTROLYTES   Christopher Jay MD   Upcoming Treatment Dates - IV FLUIDS AND ELECTROLYTES    No upcoming days in selected categories.        Christopher Jay MD  Hematology Oncology

## 2022-04-04 NOTE — PLAN OF CARE
Day 22 of outpatient radiation to the H/N (tongue). Pt reports a sore on lower center lip.Pt see by dietitian and recommended to apply Magic Mouthwash directly to sore.

## 2022-04-04 NOTE — ASSESSMENT & PLAN NOTE
Improved compared to last week, O2 sat 98% on 2L NC.  Wife reports HH administered saline into the trach Saturday, which helped him clear his airway.  -continue to monitor  -ok to get HH to administer saline more frequently throughout the week if needed.

## 2022-04-05 PROCEDURE — 77386 HC IMRT, COMPLEX: CPT | Performed by: STUDENT IN AN ORGANIZED HEALTH CARE EDUCATION/TRAINING PROGRAM

## 2022-04-05 PROCEDURE — 77014 PR  CT GUIDANCE PLACEMENT RAD THERAPY FIELDS: ICD-10-PCS | Mod: 26,,, | Performed by: STUDENT IN AN ORGANIZED HEALTH CARE EDUCATION/TRAINING PROGRAM

## 2022-04-05 PROCEDURE — 77014 HC CT GUIDANCE RADIATION THERAPY FLDS PLACEMENT: CPT | Mod: TC | Performed by: STUDENT IN AN ORGANIZED HEALTH CARE EDUCATION/TRAINING PROGRAM

## 2022-04-05 PROCEDURE — 77014 PR  CT GUIDANCE PLACEMENT RAD THERAPY FIELDS: CPT | Mod: 26,,, | Performed by: STUDENT IN AN ORGANIZED HEALTH CARE EDUCATION/TRAINING PROGRAM

## 2022-04-06 ENCOUNTER — INFUSION (OUTPATIENT)
Dept: INFUSION THERAPY | Facility: HOSPITAL | Age: 72
End: 2022-04-06
Payer: MEDICARE

## 2022-04-06 VITALS
OXYGEN SATURATION: 95 % | WEIGHT: 138.88 LBS | SYSTOLIC BLOOD PRESSURE: 127 MMHG | HEART RATE: 86 BPM | BODY MASS INDEX: 21.05 KG/M2 | RESPIRATION RATE: 18 BRPM | TEMPERATURE: 98 F | DIASTOLIC BLOOD PRESSURE: 65 MMHG | HEIGHT: 68 IN

## 2022-04-06 DIAGNOSIS — C02.9 SQUAMOUS CELL CANCER OF TONGUE: ICD-10-CM

## 2022-04-06 DIAGNOSIS — C77.0 SECONDARY MALIGNANT NEOPLASM OF CERVICAL LYMPH NODE: Primary | ICD-10-CM

## 2022-04-06 PROCEDURE — 77386 HC IMRT, COMPLEX: CPT | Performed by: STUDENT IN AN ORGANIZED HEALTH CARE EDUCATION/TRAINING PROGRAM

## 2022-04-06 PROCEDURE — 96366 THER/PROPH/DIAG IV INF ADDON: CPT

## 2022-04-06 PROCEDURE — 96367 TX/PROPH/DG ADDL SEQ IV INF: CPT

## 2022-04-06 PROCEDURE — 96361 HYDRATE IV INFUSION ADD-ON: CPT

## 2022-04-06 PROCEDURE — 77014 PR  CT GUIDANCE PLACEMENT RAD THERAPY FIELDS: ICD-10-PCS | Mod: 26,,, | Performed by: STUDENT IN AN ORGANIZED HEALTH CARE EDUCATION/TRAINING PROGRAM

## 2022-04-06 PROCEDURE — 96413 CHEMO IV INFUSION 1 HR: CPT

## 2022-04-06 PROCEDURE — 96375 TX/PRO/DX INJ NEW DRUG ADDON: CPT

## 2022-04-06 PROCEDURE — 77014 PR  CT GUIDANCE PLACEMENT RAD THERAPY FIELDS: CPT | Mod: 26,,, | Performed by: STUDENT IN AN ORGANIZED HEALTH CARE EDUCATION/TRAINING PROGRAM

## 2022-04-06 PROCEDURE — 63600175 PHARM REV CODE 636 W HCPCS: Performed by: STUDENT IN AN ORGANIZED HEALTH CARE EDUCATION/TRAINING PROGRAM

## 2022-04-06 PROCEDURE — 25000003 PHARM REV CODE 250: Performed by: STUDENT IN AN ORGANIZED HEALTH CARE EDUCATION/TRAINING PROGRAM

## 2022-04-06 PROCEDURE — 96360 HYDRATION IV INFUSION INIT: CPT

## 2022-04-06 PROCEDURE — 77014 HC CT GUIDANCE RADIATION THERAPY FLDS PLACEMENT: CPT | Mod: TC | Performed by: STUDENT IN AN ORGANIZED HEALTH CARE EDUCATION/TRAINING PROGRAM

## 2022-04-06 RX ORDER — HEPARIN 100 UNIT/ML
500 SYRINGE INTRAVENOUS
Status: DISCONTINUED | OUTPATIENT
Start: 2022-04-06 | End: 2022-04-06 | Stop reason: HOSPADM

## 2022-04-06 RX ORDER — SODIUM CHLORIDE 0.9 % (FLUSH) 0.9 %
10 SYRINGE (ML) INJECTION
Status: DISCONTINUED | OUTPATIENT
Start: 2022-04-06 | End: 2022-04-06 | Stop reason: HOSPADM

## 2022-04-06 RX ADMIN — MAGNESIUM SULFATE 500 ML/HR: 500 INJECTION, SOLUTION INTRAMUSCULAR; INTRAVENOUS at 07:04

## 2022-04-06 RX ADMIN — SODIUM CHLORIDE 1000 ML: 0.9 INJECTION, SOLUTION INTRAVENOUS at 11:04

## 2022-04-06 RX ADMIN — PALONOSETRON HYDROCHLORIDE 0.25 MG: 0.25 INJECTION, SOLUTION INTRAVENOUS at 09:04

## 2022-04-06 RX ADMIN — APREPITANT 130 MG: 130 INJECTION, EMULSION INTRAVENOUS at 09:04

## 2022-04-06 RX ADMIN — CISPLATIN 70 MG: 1 INJECTION INTRAVENOUS at 10:04

## 2022-04-06 NOTE — PLAN OF CARE
Pt received Cisplatin & IVFs today and tolerated well, without complications. VSS throughout infusion. Educated patient about Cisplatin & IVFs (indications, side effects, possible reactions, chemotherapy precautions) and verbalized understanding. PIV positive for blood return, saline locked and removed prior to DC, catheter tip intact. Pt DC with no distress noted, WC off unit, w/o event, via family, pleased.

## 2022-04-07 PROCEDURE — 77014 PR  CT GUIDANCE PLACEMENT RAD THERAPY FIELDS: ICD-10-PCS | Mod: 26,,, | Performed by: STUDENT IN AN ORGANIZED HEALTH CARE EDUCATION/TRAINING PROGRAM

## 2022-04-07 PROCEDURE — 77014 PR  CT GUIDANCE PLACEMENT RAD THERAPY FIELDS: CPT | Mod: 26,,, | Performed by: STUDENT IN AN ORGANIZED HEALTH CARE EDUCATION/TRAINING PROGRAM

## 2022-04-07 PROCEDURE — 77014 HC CT GUIDANCE RADIATION THERAPY FLDS PLACEMENT: CPT | Mod: TC | Performed by: STUDENT IN AN ORGANIZED HEALTH CARE EDUCATION/TRAINING PROGRAM

## 2022-04-07 PROCEDURE — 77386 HC IMRT, COMPLEX: CPT | Performed by: STUDENT IN AN ORGANIZED HEALTH CARE EDUCATION/TRAINING PROGRAM

## 2022-04-08 PROCEDURE — 77014 HC CT GUIDANCE RADIATION THERAPY FLDS PLACEMENT: CPT | Mod: TC | Performed by: STUDENT IN AN ORGANIZED HEALTH CARE EDUCATION/TRAINING PROGRAM

## 2022-04-08 PROCEDURE — 77014 PR  CT GUIDANCE PLACEMENT RAD THERAPY FIELDS: ICD-10-PCS | Mod: 26,,, | Performed by: STUDENT IN AN ORGANIZED HEALTH CARE EDUCATION/TRAINING PROGRAM

## 2022-04-08 PROCEDURE — 77014 PR  CT GUIDANCE PLACEMENT RAD THERAPY FIELDS: CPT | Mod: 26,,, | Performed by: STUDENT IN AN ORGANIZED HEALTH CARE EDUCATION/TRAINING PROGRAM

## 2022-04-08 PROCEDURE — 77386 HC IMRT, COMPLEX: CPT | Performed by: STUDENT IN AN ORGANIZED HEALTH CARE EDUCATION/TRAINING PROGRAM

## 2022-04-08 PROCEDURE — 77336 RADIATION PHYSICS CONSULT: CPT | Performed by: STUDENT IN AN ORGANIZED HEALTH CARE EDUCATION/TRAINING PROGRAM

## 2022-04-11 ENCOUNTER — DOCUMENTATION ONLY (OUTPATIENT)
Dept: RADIATION ONCOLOGY | Facility: CLINIC | Age: 72
End: 2022-04-11
Payer: MEDICARE

## 2022-04-11 ENCOUNTER — DOCUMENTATION ONLY (OUTPATIENT)
Dept: RADIATION THERAPY | Facility: HOSPITAL | Age: 72
End: 2022-04-11
Payer: MEDICARE

## 2022-04-11 PROCEDURE — 77014 PR  CT GUIDANCE PLACEMENT RAD THERAPY FIELDS: CPT | Mod: 26,,, | Performed by: STUDENT IN AN ORGANIZED HEALTH CARE EDUCATION/TRAINING PROGRAM

## 2022-04-11 PROCEDURE — 77014 HC CT GUIDANCE RADIATION THERAPY FLDS PLACEMENT: CPT | Mod: TC | Performed by: STUDENT IN AN ORGANIZED HEALTH CARE EDUCATION/TRAINING PROGRAM

## 2022-04-11 PROCEDURE — 77014 PR  CT GUIDANCE PLACEMENT RAD THERAPY FIELDS: ICD-10-PCS | Mod: 26,,, | Performed by: STUDENT IN AN ORGANIZED HEALTH CARE EDUCATION/TRAINING PROGRAM

## 2022-04-11 PROCEDURE — 77386 HC IMRT, COMPLEX: CPT | Performed by: STUDENT IN AN ORGANIZED HEALTH CARE EDUCATION/TRAINING PROGRAM

## 2022-04-11 NOTE — PROGRESS NOTES
Oncology Nutrition  Radiation Oncology Weekly Check     Treatment Week: 6 (Day 27)    Fareed Richard Jr.   1950    Reason for Visit: Pt here for weekly visit due to high nutritional risk radiation therapy treatment (squamous cell carcinoma of the oral tongue)    Nutrition Assessment    Patient reports that he continues to tolerate his treatment well. He had one episode of vomiting last week after some lemon-lime Pedialyte. He has been using about 2oz of Pedialyte with each tube feeding (4x daily) and tolerates other flavors fine. He also uses 3oz of water per feeding. Total free water intake is about 56oz.   He has not had any further issues with PEG tube since completing antibiotics.     Weight:  140.8lb  Height: 68in    Usual BW: 162lb  Weight Change: up 1lb from last week    Nutrition Impact Symptoms    Dysphagia     Nutrition Recommendations    Continue current tube feeding regimen as this is allowing for adequate weight gain   Add another 8oz water daily (recommended 4oz BID between feedings)     Follow up weekly or as needed during treatment       Elida Rene, MPH, RD, , LDN, FAND   [phone]

## 2022-04-11 NOTE — PLAN OF CARE
Day 27 of outpatient radiation to the H/N, (tongue). Pt reports increased SOB. On 5L of oxygen at home. Miaderm cream given today. Pt instructed on timing of Miaderm cream not to be applied 4 hours prior to treatment.

## 2022-04-12 PROCEDURE — 77014 PR  CT GUIDANCE PLACEMENT RAD THERAPY FIELDS: ICD-10-PCS | Mod: 26,,, | Performed by: STUDENT IN AN ORGANIZED HEALTH CARE EDUCATION/TRAINING PROGRAM

## 2022-04-12 PROCEDURE — 77014 PR  CT GUIDANCE PLACEMENT RAD THERAPY FIELDS: CPT | Mod: 26,,, | Performed by: STUDENT IN AN ORGANIZED HEALTH CARE EDUCATION/TRAINING PROGRAM

## 2022-04-12 PROCEDURE — 77386 HC IMRT, COMPLEX: CPT | Performed by: STUDENT IN AN ORGANIZED HEALTH CARE EDUCATION/TRAINING PROGRAM

## 2022-04-12 PROCEDURE — 77014 HC CT GUIDANCE RADIATION THERAPY FLDS PLACEMENT: CPT | Mod: TC | Performed by: STUDENT IN AN ORGANIZED HEALTH CARE EDUCATION/TRAINING PROGRAM

## 2022-04-13 PROCEDURE — 77386 HC IMRT, COMPLEX: CPT | Performed by: STUDENT IN AN ORGANIZED HEALTH CARE EDUCATION/TRAINING PROGRAM

## 2022-04-13 PROCEDURE — 77014 HC CT GUIDANCE RADIATION THERAPY FLDS PLACEMENT: CPT | Mod: TC | Performed by: STUDENT IN AN ORGANIZED HEALTH CARE EDUCATION/TRAINING PROGRAM

## 2022-04-13 PROCEDURE — 77014 PR  CT GUIDANCE PLACEMENT RAD THERAPY FIELDS: CPT | Mod: 26,,, | Performed by: STUDENT IN AN ORGANIZED HEALTH CARE EDUCATION/TRAINING PROGRAM

## 2022-04-13 PROCEDURE — 77014 PR  CT GUIDANCE PLACEMENT RAD THERAPY FIELDS: ICD-10-PCS | Mod: 26,,, | Performed by: STUDENT IN AN ORGANIZED HEALTH CARE EDUCATION/TRAINING PROGRAM

## 2022-04-14 ENCOUNTER — OFFICE VISIT (OUTPATIENT)
Dept: OTOLARYNGOLOGY | Facility: CLINIC | Age: 72
End: 2022-04-14
Payer: MEDICARE

## 2022-04-14 VITALS — HEART RATE: 80 BPM | DIASTOLIC BLOOD PRESSURE: 70 MMHG | SYSTOLIC BLOOD PRESSURE: 104 MMHG

## 2022-04-14 DIAGNOSIS — C02.9 SQUAMOUS CELL CANCER OF TONGUE: Primary | ICD-10-CM

## 2022-04-14 PROCEDURE — 77014 PR  CT GUIDANCE PLACEMENT RAD THERAPY FIELDS: CPT | Mod: 26,,, | Performed by: STUDENT IN AN ORGANIZED HEALTH CARE EDUCATION/TRAINING PROGRAM

## 2022-04-14 PROCEDURE — 99999 PR PBB SHADOW E&M-EST. PATIENT-LVL III: CPT | Mod: PBBFAC,,, | Performed by: OTOLARYNGOLOGY

## 2022-04-14 PROCEDURE — 3288F PR FALLS RISK ASSESSMENT DOCUMENTED: ICD-10-PCS | Mod: CPTII,S$GLB,, | Performed by: OTOLARYNGOLOGY

## 2022-04-14 PROCEDURE — 1159F PR MEDICATION LIST DOCUMENTED IN MEDICAL RECORD: ICD-10-PCS | Mod: CPTII,S$GLB,, | Performed by: OTOLARYNGOLOGY

## 2022-04-14 PROCEDURE — 3078F DIAST BP <80 MM HG: CPT | Mod: CPTII,S$GLB,, | Performed by: OTOLARYNGOLOGY

## 2022-04-14 PROCEDURE — 1126F PR PAIN SEVERITY QUANTIFIED, NO PAIN PRESENT: ICD-10-PCS | Mod: CPTII,S$GLB,, | Performed by: OTOLARYNGOLOGY

## 2022-04-14 PROCEDURE — 77386 HC IMRT, COMPLEX: CPT | Performed by: STUDENT IN AN ORGANIZED HEALTH CARE EDUCATION/TRAINING PROGRAM

## 2022-04-14 PROCEDURE — 3074F PR MOST RECENT SYSTOLIC BLOOD PRESSURE < 130 MM HG: ICD-10-PCS | Mod: CPTII,S$GLB,, | Performed by: OTOLARYNGOLOGY

## 2022-04-14 PROCEDURE — 1101F PR PT FALLS ASSESS DOC 0-1 FALLS W/OUT INJ PAST YR: ICD-10-PCS | Mod: CPTII,S$GLB,, | Performed by: OTOLARYNGOLOGY

## 2022-04-14 PROCEDURE — 77014 HC CT GUIDANCE RADIATION THERAPY FLDS PLACEMENT: CPT | Mod: TC | Performed by: STUDENT IN AN ORGANIZED HEALTH CARE EDUCATION/TRAINING PROGRAM

## 2022-04-14 PROCEDURE — 99213 OFFICE O/P EST LOW 20 MIN: CPT | Mod: S$GLB,,, | Performed by: OTOLARYNGOLOGY

## 2022-04-14 PROCEDURE — 77014 PR  CT GUIDANCE PLACEMENT RAD THERAPY FIELDS: ICD-10-PCS | Mod: 26,,, | Performed by: STUDENT IN AN ORGANIZED HEALTH CARE EDUCATION/TRAINING PROGRAM

## 2022-04-14 PROCEDURE — 1101F PT FALLS ASSESS-DOCD LE1/YR: CPT | Mod: CPTII,S$GLB,, | Performed by: OTOLARYNGOLOGY

## 2022-04-14 PROCEDURE — 1160F RVW MEDS BY RX/DR IN RCRD: CPT | Mod: CPTII,S$GLB,, | Performed by: OTOLARYNGOLOGY

## 2022-04-14 PROCEDURE — 3288F FALL RISK ASSESSMENT DOCD: CPT | Mod: CPTII,S$GLB,, | Performed by: OTOLARYNGOLOGY

## 2022-04-14 PROCEDURE — 3078F PR MOST RECENT DIASTOLIC BLOOD PRESSURE < 80 MM HG: ICD-10-PCS | Mod: CPTII,S$GLB,, | Performed by: OTOLARYNGOLOGY

## 2022-04-14 PROCEDURE — 4010F PR ACE/ARB THEARPY RXD/TAKEN: ICD-10-PCS | Mod: CPTII,S$GLB,, | Performed by: OTOLARYNGOLOGY

## 2022-04-14 PROCEDURE — 1159F MED LIST DOCD IN RCRD: CPT | Mod: CPTII,S$GLB,, | Performed by: OTOLARYNGOLOGY

## 2022-04-14 PROCEDURE — 3074F SYST BP LT 130 MM HG: CPT | Mod: CPTII,S$GLB,, | Performed by: OTOLARYNGOLOGY

## 2022-04-14 PROCEDURE — 1160F PR REVIEW ALL MEDS BY PRESCRIBER/CLIN PHARMACIST DOCUMENTED: ICD-10-PCS | Mod: CPTII,S$GLB,, | Performed by: OTOLARYNGOLOGY

## 2022-04-14 PROCEDURE — 99999 PR PBB SHADOW E&M-EST. PATIENT-LVL III: ICD-10-PCS | Mod: PBBFAC,,, | Performed by: OTOLARYNGOLOGY

## 2022-04-14 PROCEDURE — 4010F ACE/ARB THERAPY RXD/TAKEN: CPT | Mod: CPTII,S$GLB,, | Performed by: OTOLARYNGOLOGY

## 2022-04-14 PROCEDURE — 99213 PR OFFICE/OUTPT VISIT, EST, LEVL III, 20-29 MIN: ICD-10-PCS | Mod: S$GLB,,, | Performed by: OTOLARYNGOLOGY

## 2022-04-14 PROCEDURE — 1126F AMNT PAIN NOTED NONE PRSNT: CPT | Mod: CPTII,S$GLB,, | Performed by: OTOLARYNGOLOGY

## 2022-04-14 NOTE — PROGRESS NOTES
Chief Complaint   Patient presents with    Tracheostomy Tube Change     Oncology History   Squamous cell cancer of tongue   12/16/2021 Initial Diagnosis    Squamous cell cancer of tongue     12/16/2021 Tumor Conference       His case was discussed at the Multidisciplinary Head and Neck Team Planning Meeting.    Representatives from Medical Oncology, Radiation Oncology, Head and Neck Surgical Oncology, Psychosocial Oncology, and Speech and Language Pathology discussed the case with the following recommendations:    1) surgery  2) work up lung nodule post op         1/2/2022 Cancer Staged    Staging form: Oral Cavity, AJCC 8th Edition  - Clinical stage from 1/2/2022: Stage NAFISA (cT2, cN2a, cM0)     1/4/2022 Cancer Staged    Staging form: Oral Cavity, AJCC 8th Edition  - Pathologic stage from 1/4/2022: Stage IVB (pT4a, pN3b, cM0)     2/28/2022 -  Chemotherapy    Treatment Summary   Plan Name: OP HEAD NECK CISPLATIN WEEKLY + RADIOTHERAPY  Treatment Goal: Curative  Status: Active  Start Date: 2/28/2022  End Date: 4/13/2022 (Planned)  Provider: Christopher Jay MD  Chemotherapy: CISplatin (PLATINOL) 40 mg/m2 = 69 mg in sodium chloride 0.9% 500 mL chemo infusion, 40 mg/m2 = 69 mg, Intravenous, Clinic/HOD 1 time, 6 of 7 cycles  Administration: 69 mg (2/28/2022), 66 mg (3/7/2022), 69 mg (3/16/2022), 69 mg (3/23/2022), 69 mg (3/30/2022), 70 mg (4/6/2022)     Secondary malignant neoplasm of cervical lymph node   2/16/2022 Initial Diagnosis    Secondary malignant neoplasm of cervical lymph node     2/28/2022 -  Chemotherapy    Treatment Summary   Plan Name: OP HEAD NECK CISPLATIN WEEKLY + RADIOTHERAPY  Treatment Goal: Curative  Status: Active  Start Date: 2/28/2022  End Date: 4/13/2022 (Planned)  Provider: Christopher Jay MD  Chemotherapy: CISplatin (PLATINOL) 40 mg/m2 = 69 mg in sodium chloride 0.9% 500 mL chemo infusion, 40 mg/m2 = 69 mg, Intravenous, Clinic/HOD 1 time, 6 of 7 cycles  Administration: 69 mg (2/28/2022), 66 mg  (3/7/2022), 69 mg (3/16/2022), 69 mg (3/23/2022), 69 mg (3/30/2022), 70 mg (4/6/2022)           HPI   72 y.o. male presents with the above treatment history.  He has nearly completed chemoradiation therapy.  He presents today for trach change.    Review of Systems   Constitutional: Negative for fatigue and unexpected weight change.   HENT: Per HPI.  Eyes: Negative for visual disturbance.   Respiratory: Negative for shortness of breath, hemoptysis   Cardiovascular: Negative for chest pain and palpitations.   Musculoskeletal: Negative for decreased ROM, back pain.   Skin: Negative for rash, sunburn, itching.   Neurological: Negative for dizziness and seizures.   Hematological: Negative for adenopathy. Does not bruise/bleed easily.   Endocrine: Negative for rapid weight loss/weight gain, heat/cold intolerance.     Past Medical History   Patient Active Problem List   Diagnosis    Peripheral vascular disease    Carotid stenosis    Squamous cell cancer of tongue    Coronary artery disease involving native coronary artery of native heart without angina pectoris    Primary hypertension    Other hyperlipidemia    Pre-operative cardiovascular examination    Impaired mobility and ADLs    Tracheobronchitis    Toxic effect of tobacco cigarette, intentional self-harm    COPD exacerbation    Normocytic anemia    Secondary malignant neoplasm of cervical lymph node    Protein-calorie malnutrition    Chronic respiratory failure with hypoxia, on home O2 therapy    Immunodeficiency due to drug therapy           Past Surgical History   Past Surgical History:   Procedure Laterality Date    ABDOMINAL SURGERY      stents placed in liver and large intestines, per patient    CAROTID STENT      CORONARY STENT PLACEMENT  01/2000    DISSECTION OF NECK Bilateral 1/4/2022    Procedure: DISSECTION, NECK;  Surgeon: Naeem Smalls MD;  Location: [facility] OR [address] [place] [address];  Service: ENT;  Laterality: Bilateral;     ESOPHAGOGASTRODUODENOSCOPY W/ PEG N/A 1/4/2022    Procedure: EGD, WITH PEG TUBE INSERTION;  Surgeon: Anthony Calabrese MD;  [facility];  Service: General;  Laterality: N/A;    EYE SURGERY      Cataract bilateral    femoral stents      bilateral    FLAP PROCEDURE N/A 1/3/2022    Procedure: CREATION, FREE FLAP;  Surgeon: Naeem Smalls MD;  Location: [facility] OR [facility];  Service: ENT;  Laterality: N/A;    FLAP PROCEDURE Right 1/4/2022    Procedure: CREATION, FREE FLAP;  Surgeon: Stacey Conde MD;  [facility];  Service: ENT;  Laterality: Right;  Ischemic start 1203  Ischemic stop 1353    GLOSSECTOMY N/A 1/4/2022    Procedure: GLOSSECTOMY;  Surgeon: Naeem Smalls MD;  [facility];  Service: ENT;  Laterality: N/A;    RADICAL NECK DISSECTION Left 1/3/2022    Procedure: DISSECTION, NECK, RADICAL;  Surgeon: Naeem Smalls MD;  [facility];  Service: ENT;  Laterality: Left;    SKIN CANCER EXCISION      TRACHEOTOMY N/A 1/4/2022    Procedure: TRACHEOTOMY;  Surgeon: Naeem Smalls MD;  [facility];  Service: ENT;  Laterality: N/A;         Family History   History reviewed. No pertinent family history.        Social History   .  Social History     Socioeconomic History    Marital status:    Tobacco Use    Smoking status: Former Smoker     Packs/day: 2.00     Years: 40.00     Pack years: 80.00     Types: Cigarettes     Start date: 4/17/1963     Quit date: 4/17/2018     Years since quitting: 3.9    Smokeless tobacco: Never Used    Tobacco comment: 3/3 ppd x 40 yrs. Currently 3-4 cigarettes daily .He is trying  to quit. Is using a Vapor cigarettes  2-3 x's a day.   Substance and Sexual Activity    Alcohol use: Yes     Alcohol/week: 3.0 standard drinks     Types: 3 Cans of beer per week     Comment: beer daily 3-4    Drug use: No    Sexual activity: Not Currently         Allergies   Review of patient's allergies  indicates:  No Known Allergies        Physical Exam     Vitals:    04/14/22 1132   BP: 104/70   Pulse: 80         There is no height or weight on file to calculate BMI.      General: AOx3, NAD   Respiratory:  Symmetric chest rise, normal effort  Neck:  Well-healed neck scars.  Size 6 uncuffed Shiley tracheostomy tube in place.  Exchanged for identical tube.  Face: House Brackmann I bilaterally.     Assessment/Plan  Problem List Items Addressed This Visit        Oncology    Squamous cell cancer of tongue - Primary     Status post surgery and nearly finished with chemoradiation therapy.  Trach changed today.  Return in 1 month.

## 2022-04-14 NOTE — ASSESSMENT & PLAN NOTE
Status post surgery and nearly finished with chemoradiation therapy.  Trach changed today.  Return in 1 month.

## 2022-04-18 ENCOUNTER — DOCUMENTATION ONLY (OUTPATIENT)
Dept: RADIATION THERAPY | Facility: HOSPITAL | Age: 72
End: 2022-04-18
Payer: MEDICARE

## 2022-04-18 ENCOUNTER — TELEPHONE (OUTPATIENT)
Dept: OTOLARYNGOLOGY | Facility: CLINIC | Age: 72
End: 2022-04-18
Payer: MEDICARE

## 2022-04-18 ENCOUNTER — DOCUMENTATION ONLY (OUTPATIENT)
Dept: RADIATION ONCOLOGY | Facility: CLINIC | Age: 72
End: 2022-04-18
Payer: MEDICARE

## 2022-04-18 DIAGNOSIS — J96.11 CHRONIC RESPIRATORY FAILURE WITH HYPOXIA, ON HOME O2 THERAPY: Primary | ICD-10-CM

## 2022-04-18 DIAGNOSIS — Z99.81 CHRONIC RESPIRATORY FAILURE WITH HYPOXIA, ON HOME O2 THERAPY: Primary | ICD-10-CM

## 2022-04-18 PROCEDURE — 77014 HC CT GUIDANCE RADIATION THERAPY FLDS PLACEMENT: CPT | Mod: TC | Performed by: STUDENT IN AN ORGANIZED HEALTH CARE EDUCATION/TRAINING PROGRAM

## 2022-04-18 PROCEDURE — 77014 PR  CT GUIDANCE PLACEMENT RAD THERAPY FIELDS: ICD-10-PCS | Mod: 26,,, | Performed by: STUDENT IN AN ORGANIZED HEALTH CARE EDUCATION/TRAINING PROGRAM

## 2022-04-18 PROCEDURE — 77336 RADIATION PHYSICS CONSULT: CPT | Performed by: STUDENT IN AN ORGANIZED HEALTH CARE EDUCATION/TRAINING PROGRAM

## 2022-04-18 PROCEDURE — 77386 HC IMRT, COMPLEX: CPT | Performed by: STUDENT IN AN ORGANIZED HEALTH CARE EDUCATION/TRAINING PROGRAM

## 2022-04-18 PROCEDURE — 77014 PR  CT GUIDANCE PLACEMENT RAD THERAPY FIELDS: CPT | Mod: 26,,, | Performed by: STUDENT IN AN ORGANIZED HEALTH CARE EDUCATION/TRAINING PROGRAM

## 2022-04-18 RX ORDER — OXYCODONE HCL 5 MG/5 ML
5 SOLUTION, ORAL ORAL EVERY 4 HOURS PRN
Qty: 150 ML | Refills: 0 | Status: SHIPPED | OUTPATIENT
Start: 2022-04-18 | End: 2023-05-10

## 2022-04-18 NOTE — PROGRESS NOTES
Oncology Nutrition  Radiation Oncology Weekly Check     Treatment Week: 7 (Day 31)    Fareed Richard    1950    Reason for Visit: Pt here for weekly visit due to high nutritional risk radiation therapy treatment (squamous cell carcinoma of the oral tongue)    Nutrition Assessment    Patient reports increased shortness of breath. Otherwise tolerating treatment well. He continues tube feedings of 6 cartons of Isosource 1.5 daily.He continues tube feedings of 6 cartons of Isosource 1.5 daily. Now doing 2oz water before and after feeding with an additional 2oz pedialyte after each feeding.     Weight:  144.9lb  Height: 68in    Usual BW: 162lb  Weight Change: up 4lb from last week    Nutrition Impact Symptoms    Dysphagia     Nutrition Recommendations    Continue current tube feeding regimen   Chest x-ray per MD due to increased SOB and abnormal  weight gain     Follow up weekly or as needed during treatment       Elida Rene, MPH, RD, , LDN, FAND   [phone]

## 2022-04-18 NOTE — PLAN OF CARE
Day 31 of outpatient radiation to the larynx  wgt up 4.1 lbs  appears sob on 02 at 3 l per trach cuff  Patient refuses to go for cxry today will get tomorrow

## 2022-04-18 NOTE — TELEPHONE ENCOUNTER
----- Message from Alicia Paul RN sent at 4/12/2022  3:14 PM CDT -----    ----- Message -----  From: Ashli Benjamin  Sent: 4/12/2022  11:46 AM CDT  To: Slim Hartley Staff    [facility]/ [facility] calling regarding Orders (message) for  tracheostomy supplies.  call back [phone] and fax [phone]

## 2022-04-19 ENCOUNTER — HOSPITAL ENCOUNTER (INPATIENT)
Facility: HOSPITAL | Age: 72
LOS: 23 days | Discharge: NURSING, REHAB OR LONG-TERM CARE | DRG: 208 | End: 2022-05-12
Attending: EMERGENCY MEDICINE | Admitting: EMERGENCY MEDICINE
Payer: MEDICARE

## 2022-04-19 DIAGNOSIS — R13.10 DYSPHAGIA, UNSPECIFIED TYPE: ICD-10-CM

## 2022-04-19 DIAGNOSIS — J96.20 ACUTE ON CHRONIC RESPIRATORY FAILURE: ICD-10-CM

## 2022-04-19 DIAGNOSIS — R09.02 HYPOXIA: Primary | ICD-10-CM

## 2022-04-19 DIAGNOSIS — E87.5 HYPERKALEMIA: ICD-10-CM

## 2022-04-19 DIAGNOSIS — E78.49 OTHER HYPERLIPIDEMIA: ICD-10-CM

## 2022-04-19 DIAGNOSIS — Z99.81 CHRONIC RESPIRATORY FAILURE WITH HYPOXIA, ON HOME O2 THERAPY: ICD-10-CM

## 2022-04-19 DIAGNOSIS — J96.11 CHRONIC RESPIRATORY FAILURE WITH HYPOXIA, ON HOME O2 THERAPY: ICD-10-CM

## 2022-04-19 DIAGNOSIS — I10 PRIMARY HYPERTENSION: ICD-10-CM

## 2022-04-19 DIAGNOSIS — R06.2 WHEEZES: ICD-10-CM

## 2022-04-19 DIAGNOSIS — I50.9 HEART FAILURE: ICD-10-CM

## 2022-04-19 DIAGNOSIS — E87.6 HYPOKALEMIA: ICD-10-CM

## 2022-04-19 DIAGNOSIS — D64.9 NORMOCYTIC ANEMIA: ICD-10-CM

## 2022-04-19 DIAGNOSIS — J69.0 ASPIRATION PNEUMONIA OF LEFT LUNG DUE TO GASTRIC SECRETIONS, UNSPECIFIED PART OF LUNG: ICD-10-CM

## 2022-04-19 DIAGNOSIS — R00.0 TACHYCARDIA: ICD-10-CM

## 2022-04-19 DIAGNOSIS — Z71.89 GOALS OF CARE, COUNSELING/DISCUSSION: ICD-10-CM

## 2022-04-19 DIAGNOSIS — I25.10 CORONARY ARTERY DISEASE INVOLVING NATIVE CORONARY ARTERY OF NATIVE HEART WITHOUT ANGINA PECTORIS: ICD-10-CM

## 2022-04-19 DIAGNOSIS — C02.9 SQUAMOUS CELL CANCER OF TONGUE: ICD-10-CM

## 2022-04-19 DIAGNOSIS — R06.02 SOB (SHORTNESS OF BREATH): ICD-10-CM

## 2022-04-19 DIAGNOSIS — R06.89 HYPERCAPNEMIA: ICD-10-CM

## 2022-04-19 DIAGNOSIS — E44.0 MODERATE PROTEIN-CALORIE MALNUTRITION: ICD-10-CM

## 2022-04-19 DIAGNOSIS — J96.22 ACUTE ON CHRONIC RESPIRATORY FAILURE WITH HYPERCAPNIA: ICD-10-CM

## 2022-04-19 LAB
ABO + RH BLD: NORMAL
ALBUMIN SERPL BCP-MCNC: 2.5 G/DL (ref 3.5–5.2)
ALLENS TEST: ABNORMAL
ALP SERPL-CCNC: 82 U/L (ref 55–135)
ALT SERPL W/O P-5'-P-CCNC: 17 U/L (ref 10–44)
ANION GAP SERPL CALC-SCNC: 10 MMOL/L (ref 8–16)
ANION GAP SERPL CALC-SCNC: 6 MMOL/L (ref 8–16)
APTT BLDCRRT: <21 SEC (ref 21–32)
ASCENDING AORTA: 3.33 CM
AST SERPL-CCNC: 16 U/L (ref 10–40)
AV INDEX (PROSTH): 0.85
AV MEAN GRADIENT: 3 MMHG
AV PEAK GRADIENT: 5 MMHG
AV VALVE AREA: 2.58 CM2
AV VELOCITY RATIO: 0.71
BASOPHILS # BLD AUTO: 0.01 K/UL (ref 0–0.2)
BASOPHILS # BLD AUTO: 0.03 K/UL (ref 0–0.2)
BASOPHILS NFR BLD: 0.3 % (ref 0–1.9)
BASOPHILS NFR BLD: 0.6 % (ref 0–1.9)
BILIRUB SERPL-MCNC: 0.2 MG/DL (ref 0.1–1)
BLD GP AB SCN CELLS X3 SERPL QL: NORMAL
BNP SERPL-MCNC: 1607 PG/ML (ref 0–99)
BSA FOR ECHO PROCEDURE: 1.72 M2
BUN SERPL-MCNC: 14 MG/DL (ref 8–23)
BUN SERPL-MCNC: 16 MG/DL (ref 8–23)
BUN SERPL-MCNC: 17 MG/DL (ref 6–30)
CALCIUM SERPL-MCNC: 8.5 MG/DL (ref 8.7–10.5)
CALCIUM SERPL-MCNC: 9.4 MG/DL (ref 8.7–10.5)
CHLORIDE SERPL-SCNC: 84 MMOL/L (ref 95–110)
CHLORIDE SERPL-SCNC: 86 MMOL/L (ref 95–110)
CHLORIDE SERPL-SCNC: 88 MMOL/L (ref 95–110)
CO2 SERPL-SCNC: 35 MMOL/L (ref 23–29)
CO2 SERPL-SCNC: 38 MMOL/L (ref 23–29)
CREAT SERPL-MCNC: 0.5 MG/DL (ref 0.5–1.4)
CREAT SERPL-MCNC: 0.5 MG/DL (ref 0.5–1.4)
CREAT SERPL-MCNC: 0.6 MG/DL (ref 0.5–1.4)
CTP QC/QA: YES
CTP QC/QA: YES
CV ECHO LV RWT: 0.34 CM
DELSYS: ABNORMAL
DIFFERENTIAL METHOD: ABNORMAL
DIFFERENTIAL METHOD: ABNORMAL
DOP CALC AO PEAK VEL: 1.12 M/S
DOP CALC AO VTI: 19.53 CM
DOP CALC LVOT AREA: 3 CM2
DOP CALC LVOT DIAMETER: 1.97 CM
DOP CALC LVOT PEAK VEL: 0.79 M/S
DOP CALC LVOT STROKE VOLUME: 50.33 CM3
DOP CALCLVOT PEAK VEL VTI: 16.52 CM
ECHO LV POSTERIOR WALL: 0.82 CM (ref 0.6–1.1)
EJECTION FRACTION: 60 %
EOSINOPHIL # BLD AUTO: 0 K/UL (ref 0–0.5)
EOSINOPHIL # BLD AUTO: 0 K/UL (ref 0–0.5)
EOSINOPHIL NFR BLD: 0 % (ref 0–8)
EOSINOPHIL NFR BLD: 0 % (ref 0–8)
ERYTHROCYTE [DISTWIDTH] IN BLOOD BY AUTOMATED COUNT: 17.7 % (ref 11.5–14.5)
ERYTHROCYTE [DISTWIDTH] IN BLOOD BY AUTOMATED COUNT: 17.8 % (ref 11.5–14.5)
EST. GFR  (AFRICAN AMERICAN): >60 ML/MIN/1.73 M^2
EST. GFR  (AFRICAN AMERICAN): >60 ML/MIN/1.73 M^2
EST. GFR  (NON AFRICAN AMERICAN): >60 ML/MIN/1.73 M^2
EST. GFR  (NON AFRICAN AMERICAN): >60 ML/MIN/1.73 M^2
FIO2: 28
FIO2: 40
FIO2: 40
FLOW: 15
FLOW: 5
FRACTIONAL SHORTENING: 37 % (ref 28–44)
GLUCOSE SERPL-MCNC: 123 MG/DL (ref 70–110)
GLUCOSE SERPL-MCNC: 128 MG/DL (ref 70–110)
GLUCOSE SERPL-MCNC: 92 MG/DL (ref 70–110)
HCO3 UR-SCNC: 33.6 MMOL/L (ref 24–28)
HCO3 UR-SCNC: 39.5 MMOL/L (ref 24–28)
HCO3 UR-SCNC: 42.7 MMOL/L (ref 24–28)
HCT VFR BLD AUTO: 21.5 % (ref 40–54)
HCT VFR BLD AUTO: 27.6 % (ref 40–54)
HCT VFR BLD CALC: 26 %PCV (ref 36–54)
HCV AB SERPL QL IA: NEGATIVE
HGB BLD-MCNC: 6.5 G/DL (ref 14–18)
HGB BLD-MCNC: 8.2 G/DL (ref 14–18)
IMM GRANULOCYTES # BLD AUTO: 0.06 K/UL (ref 0–0.04)
IMM GRANULOCYTES # BLD AUTO: 0.08 K/UL (ref 0–0.04)
IMM GRANULOCYTES NFR BLD AUTO: 1.5 % (ref 0–0.5)
IMM GRANULOCYTES NFR BLD AUTO: 1.6 % (ref 0–0.5)
INTERVENTRICULAR SEPTUM: 1.26 CM (ref 0.6–1.1)
LA MAJOR: 5.14 CM
LA MINOR: 4.61 CM
LA WIDTH: 3.59 CM
LACTATE SERPL-SCNC: 1 MMOL/L (ref 0.5–2.2)
LEFT ATRIUM SIZE: 3.91 CM
LEFT ATRIUM VOLUME INDEX: 33.5 ML/M2
LEFT ATRIUM VOLUME: 57.99 CM3
LEFT INTERNAL DIMENSION IN SYSTOLE: 3.07 CM (ref 2.1–4)
LEFT VENTRICLE DIASTOLIC VOLUME INDEX: 63.37 ML/M2
LEFT VENTRICLE DIASTOLIC VOLUME: 109.63 ML
LEFT VENTRICLE MASS INDEX: 105 G/M2
LEFT VENTRICLE SYSTOLIC VOLUME INDEX: 21.5 ML/M2
LEFT VENTRICLE SYSTOLIC VOLUME: 37.13 ML
LEFT VENTRICULAR INTERNAL DIMENSION IN DIASTOLE: 4.84 CM (ref 3.5–6)
LEFT VENTRICULAR MASS: 181.97 G
LYMPHOCYTES # BLD AUTO: 0.3 K/UL (ref 1–4.8)
LYMPHOCYTES # BLD AUTO: 0.3 K/UL (ref 1–4.8)
LYMPHOCYTES NFR BLD: 5.4 % (ref 18–48)
LYMPHOCYTES NFR BLD: 9 % (ref 18–48)
MCH RBC QN AUTO: 29.1 PG (ref 27–31)
MCH RBC QN AUTO: 29.7 PG (ref 27–31)
MCHC RBC AUTO-ENTMCNC: 29.7 G/DL (ref 32–36)
MCHC RBC AUTO-ENTMCNC: 30.2 G/DL (ref 32–36)
MCV RBC AUTO: 98 FL (ref 82–98)
MCV RBC AUTO: 98 FL (ref 82–98)
MODE: ABNORMAL
MONOCYTES # BLD AUTO: 0.8 K/UL (ref 0.3–1)
MONOCYTES # BLD AUTO: 1 K/UL (ref 0.3–1)
MONOCYTES NFR BLD: 19.6 % (ref 4–15)
MONOCYTES NFR BLD: 21.5 % (ref 4–15)
NEUTROPHILS # BLD AUTO: 2.5 K/UL (ref 1.8–7.7)
NEUTROPHILS # BLD AUTO: 3.8 K/UL (ref 1.8–7.7)
NEUTROPHILS NFR BLD: 67.6 % (ref 38–73)
NEUTROPHILS NFR BLD: 72.9 % (ref 38–73)
NRBC BLD-RTO: 0 /100 WBC
NRBC BLD-RTO: 0 /100 WBC
PCO2 BLDA: 101.5 MMHG (ref 35–45)
PCO2 BLDA: 56.5 MMHG (ref 35–45)
PCO2 BLDA: 56.7 MMHG (ref 35–45)
PCO2 BLDA: 87.3 MMHG (ref 35–45)
PEEP: 5
PEEP: 5
PH SMN: 7.23 [PH] (ref 7.35–7.45)
PH SMN: 7.26 [PH] (ref 7.35–7.45)
PH SMN: 7.38 [PH] (ref 7.35–7.45)
PH SMN: 7.47 [PH] (ref 7.35–7.45)
PLATELET # BLD AUTO: 237 K/UL (ref 150–450)
PLATELET # BLD AUTO: 281 K/UL (ref 150–450)
PMV BLD AUTO: 8.8 FL (ref 9.2–12.9)
PMV BLD AUTO: 8.8 FL (ref 9.2–12.9)
PO2 BLDA: 16 MMHG (ref 40–60)
PO2 BLDA: 20 MMHG (ref 40–60)
PO2 BLDA: 25 MMHG (ref 40–60)
PO2 BLDA: 26 MMHG (ref 40–60)
POC BE: 12 MMOL/L
POC BE: 15 MMOL/L
POC BE: 18 MMOL/L
POC BE: 9 MMOL/L
POC IONIZED CALCIUM: 1.23 MMOL/L (ref 1.06–1.42)
POC MOLECULAR INFLUENZA A AGN: NEGATIVE
POC MOLECULAR INFLUENZA B AGN: NEGATIVE
POC SATURATED O2: 14 % (ref 95–100)
POC SATURATED O2: 34 % (ref 95–100)
POC SATURATED O2: 34 % (ref 95–100)
POC SATURATED O2: 46 % (ref 95–100)
POC TCO2 (MEASURED): 38 MMOL/L (ref 23–29)
POC TCO2: 35 MMOL/L (ref 24–29)
POC TCO2: 42 MMOL/L (ref 24–29)
POC TCO2: 46 MMOL/L (ref 24–29)
POTASSIUM BLD-SCNC: 5.5 MMOL/L (ref 3.5–5.1)
POTASSIUM SERPL-SCNC: 4.8 MMOL/L (ref 3.5–5.1)
POTASSIUM SERPL-SCNC: 5.6 MMOL/L (ref 3.5–5.1)
PROCALCITONIN SERPL IA-MCNC: 0.09 NG/ML
PROT SERPL-MCNC: 6.1 G/DL (ref 6–8.4)
PS: 10
PS: 5
RA MAJOR: 4.23 CM
RA WIDTH: 3.69 CM
RBC # BLD AUTO: 2.19 M/UL (ref 4.6–6.2)
RBC # BLD AUTO: 2.82 M/UL (ref 4.6–6.2)
RIGHT VENTRICULAR END-DIASTOLIC DIMENSION: 3.76 CM
SAMPLE: ABNORMAL
SARS-COV-2 RDRP RESP QL NAA+PROBE: NEGATIVE
SINUS: 4.2 CM
SITE: ABNORMAL
SODIUM BLD-SCNC: 128 MMOL/L (ref 136–145)
SODIUM SERPL-SCNC: 131 MMOL/L (ref 136–145)
SODIUM SERPL-SCNC: 132 MMOL/L (ref 136–145)
STJ: 3.72 CM
TDI LATERAL: 0.1 M/S
TDI SEPTAL: 0.07 M/S
TDI: 0.09 M/S
TROPONIN I SERPL DL<=0.01 NG/ML-MCNC: 0.02 NG/ML (ref 0–0.03)
WBC # BLD AUTO: 3.67 K/UL (ref 3.9–12.7)
WBC # BLD AUTO: 5.21 K/UL (ref 3.9–12.7)

## 2022-04-19 PROCEDURE — 63600175 PHARM REV CODE 636 W HCPCS: Performed by: STUDENT IN AN ORGANIZED HEALTH CARE EDUCATION/TRAINING PROGRAM

## 2022-04-19 PROCEDURE — 63600175 PHARM REV CODE 636 W HCPCS: Performed by: INTERNAL MEDICINE

## 2022-04-19 PROCEDURE — 25000003 PHARM REV CODE 250: Performed by: STUDENT IN AN ORGANIZED HEALTH CARE EDUCATION/TRAINING PROGRAM

## 2022-04-19 PROCEDURE — 87077 CULTURE AEROBIC IDENTIFY: CPT | Performed by: STUDENT IN AN ORGANIZED HEALTH CARE EDUCATION/TRAINING PROGRAM

## 2022-04-19 PROCEDURE — 86901 BLOOD TYPING SEROLOGIC RH(D): CPT | Performed by: STUDENT IN AN ORGANIZED HEALTH CARE EDUCATION/TRAINING PROGRAM

## 2022-04-19 PROCEDURE — 80048 BASIC METABOLIC PNL TOTAL CA: CPT | Mod: XB | Performed by: INTERNAL MEDICINE

## 2022-04-19 PROCEDURE — 99900035 HC TECH TIME PER 15 MIN (STAT)

## 2022-04-19 PROCEDURE — 82803 BLOOD GASES ANY COMBINATION: CPT

## 2022-04-19 PROCEDURE — U0002 COVID-19 LAB TEST NON-CDC: HCPCS | Performed by: EMERGENCY MEDICINE

## 2022-04-19 PROCEDURE — 86803 HEPATITIS C AB TEST: CPT | Performed by: EMERGENCY MEDICINE

## 2022-04-19 PROCEDURE — 27200966 HC CLOSED SUCTION SYSTEM

## 2022-04-19 PROCEDURE — 87186 SC STD MICRODIL/AGAR DIL: CPT | Performed by: STUDENT IN AN ORGANIZED HEALTH CARE EDUCATION/TRAINING PROGRAM

## 2022-04-19 PROCEDURE — 80053 COMPREHEN METABOLIC PANEL: CPT | Performed by: STUDENT IN AN ORGANIZED HEALTH CARE EDUCATION/TRAINING PROGRAM

## 2022-04-19 PROCEDURE — 25000242 PHARM REV CODE 250 ALT 637 W/ HCPCS: Performed by: STUDENT IN AN ORGANIZED HEALTH CARE EDUCATION/TRAINING PROGRAM

## 2022-04-19 PROCEDURE — 99291 CRITICAL CARE FIRST HOUR: CPT | Mod: 25

## 2022-04-19 PROCEDURE — 94640 AIRWAY INHALATION TREATMENT: CPT

## 2022-04-19 PROCEDURE — 83605 ASSAY OF LACTIC ACID: CPT | Performed by: STUDENT IN AN ORGANIZED HEALTH CARE EDUCATION/TRAINING PROGRAM

## 2022-04-19 PROCEDURE — 63600175 PHARM REV CODE 636 W HCPCS

## 2022-04-19 PROCEDURE — 25000242 PHARM REV CODE 250 ALT 637 W/ HCPCS: Performed by: INTERNAL MEDICINE

## 2022-04-19 PROCEDURE — 87040 BLOOD CULTURE FOR BACTERIA: CPT | Performed by: STUDENT IN AN ORGANIZED HEALTH CARE EDUCATION/TRAINING PROGRAM

## 2022-04-19 PROCEDURE — 99900026 HC AIRWAY MAINTENANCE (STAT)

## 2022-04-19 PROCEDURE — 20000000 HC ICU ROOM

## 2022-04-19 PROCEDURE — 83880 ASSAY OF NATRIURETIC PEPTIDE: CPT | Performed by: STUDENT IN AN ORGANIZED HEALTH CARE EDUCATION/TRAINING PROGRAM

## 2022-04-19 PROCEDURE — 96367 TX/PROPH/DG ADDL SEQ IV INF: CPT

## 2022-04-19 PROCEDURE — 93010 EKG 12-LEAD: ICD-10-PCS | Mod: ,,, | Performed by: INTERNAL MEDICINE

## 2022-04-19 PROCEDURE — 94667 MNPJ CHEST WALL 1ST: CPT

## 2022-04-19 PROCEDURE — 99291 CRITICAL CARE FIRST HOUR: CPT | Mod: CS,,, | Performed by: EMERGENCY MEDICINE

## 2022-04-19 PROCEDURE — 94761 N-INVAS EAR/PLS OXIMETRY MLT: CPT

## 2022-04-19 PROCEDURE — 85025 COMPLETE CBC W/AUTO DIFF WBC: CPT

## 2022-04-19 PROCEDURE — 96365 THER/PROPH/DIAG IV INF INIT: CPT

## 2022-04-19 PROCEDURE — 93010 ELECTROCARDIOGRAM REPORT: CPT | Mod: ,,, | Performed by: INTERNAL MEDICINE

## 2022-04-19 PROCEDURE — 27000221 HC OXYGEN, UP TO 24 HOURS

## 2022-04-19 PROCEDURE — 84145 PROCALCITONIN (PCT): CPT | Performed by: STUDENT IN AN ORGANIZED HEALTH CARE EDUCATION/TRAINING PROGRAM

## 2022-04-19 PROCEDURE — 81003 URINALYSIS AUTO W/O SCOPE: CPT | Performed by: STUDENT IN AN ORGANIZED HEALTH CARE EDUCATION/TRAINING PROGRAM

## 2022-04-19 PROCEDURE — 94002 VENT MGMT INPAT INIT DAY: CPT

## 2022-04-19 PROCEDURE — 87070 CULTURE OTHR SPECIMN AEROBIC: CPT | Performed by: STUDENT IN AN ORGANIZED HEALTH CARE EDUCATION/TRAINING PROGRAM

## 2022-04-19 PROCEDURE — 85025 COMPLETE CBC W/AUTO DIFF WBC: CPT | Mod: 91 | Performed by: STUDENT IN AN ORGANIZED HEALTH CARE EDUCATION/TRAINING PROGRAM

## 2022-04-19 PROCEDURE — 85730 THROMBOPLASTIN TIME PARTIAL: CPT | Performed by: STUDENT IN AN ORGANIZED HEALTH CARE EDUCATION/TRAINING PROGRAM

## 2022-04-19 PROCEDURE — 99291 PR CRITICAL CARE, E/M 30-74 MINUTES: ICD-10-PCS | Mod: CS,,, | Performed by: EMERGENCY MEDICINE

## 2022-04-19 PROCEDURE — 93005 ELECTROCARDIOGRAM TRACING: CPT

## 2022-04-19 PROCEDURE — 84484 ASSAY OF TROPONIN QUANT: CPT | Performed by: STUDENT IN AN ORGANIZED HEALTH CARE EDUCATION/TRAINING PROGRAM

## 2022-04-19 PROCEDURE — 86920 COMPATIBILITY TEST SPIN: CPT | Performed by: STUDENT IN AN ORGANIZED HEALTH CARE EDUCATION/TRAINING PROGRAM

## 2022-04-19 PROCEDURE — 87205 SMEAR GRAM STAIN: CPT | Performed by: STUDENT IN AN ORGANIZED HEALTH CARE EDUCATION/TRAINING PROGRAM

## 2022-04-19 RX ORDER — ONDANSETRON 8 MG/1
8 TABLET, ORALLY DISINTEGRATING ORAL EVERY 8 HOURS PRN
Status: DISCONTINUED | OUTPATIENT
Start: 2022-04-19 | End: 2022-05-12 | Stop reason: HOSPADM

## 2022-04-19 RX ORDER — FUROSEMIDE 10 MG/ML
40 INJECTION INTRAMUSCULAR; INTRAVENOUS
Status: COMPLETED | OUTPATIENT
Start: 2022-04-19 | End: 2022-04-19

## 2022-04-19 RX ORDER — IPRATROPIUM BROMIDE AND ALBUTEROL SULFATE 2.5; .5 MG/3ML; MG/3ML
3 SOLUTION RESPIRATORY (INHALATION) EVERY 4 HOURS PRN
Status: DISCONTINUED | OUTPATIENT
Start: 2022-04-19 | End: 2022-05-12 | Stop reason: HOSPADM

## 2022-04-19 RX ORDER — IPRATROPIUM BROMIDE AND ALBUTEROL SULFATE 2.5; .5 MG/3ML; MG/3ML
3 SOLUTION RESPIRATORY (INHALATION)
Status: COMPLETED | OUTPATIENT
Start: 2022-04-19 | End: 2022-04-19

## 2022-04-19 RX ORDER — NAPROXEN SODIUM 220 MG/1
81 TABLET, FILM COATED ORAL DAILY
Status: DISCONTINUED | OUTPATIENT
Start: 2022-04-19 | End: 2022-05-12 | Stop reason: HOSPADM

## 2022-04-19 RX ORDER — GLYCOPYRROLATE 1 MG/5ML
1 SOLUTION ORAL 3 TIMES DAILY
Status: DISCONTINUED | OUTPATIENT
Start: 2022-04-19 | End: 2022-04-19

## 2022-04-19 RX ORDER — BISACODYL 10 MG
10 SUPPOSITORY, RECTAL RECTAL DAILY PRN
Status: DISCONTINUED | OUTPATIENT
Start: 2022-04-19 | End: 2022-05-12 | Stop reason: HOSPADM

## 2022-04-19 RX ORDER — FOLIC ACID 1 MG/1
1 TABLET ORAL DAILY
Status: DISCONTINUED | OUTPATIENT
Start: 2022-04-19 | End: 2022-05-12 | Stop reason: HOSPADM

## 2022-04-19 RX ORDER — POLYETHYLENE GLYCOL 3350 17 G/17G
17 POWDER, FOR SOLUTION ORAL 2 TIMES DAILY
Status: DISCONTINUED | OUTPATIENT
Start: 2022-04-19 | End: 2022-05-12 | Stop reason: HOSPADM

## 2022-04-19 RX ORDER — SODIUM CHLORIDE FOR INHALATION 3 %
4 VIAL, NEBULIZER (ML) INHALATION EVERY 8 HOURS
Status: DISCONTINUED | OUTPATIENT
Start: 2022-04-19 | End: 2022-04-19

## 2022-04-19 RX ORDER — CEFEPIME HYDROCHLORIDE 1 G/50ML
2 INJECTION, SOLUTION INTRAVENOUS
Status: COMPLETED | OUTPATIENT
Start: 2022-04-19 | End: 2022-04-19

## 2022-04-19 RX ORDER — IPRATROPIUM BROMIDE AND ALBUTEROL SULFATE 2.5; .5 MG/3ML; MG/3ML
3 SOLUTION RESPIRATORY (INHALATION) EVERY 4 HOURS
Status: DISCONTINUED | OUTPATIENT
Start: 2022-04-19 | End: 2022-05-12 | Stop reason: HOSPADM

## 2022-04-19 RX ORDER — SODIUM CHLORIDE 0.9 % (FLUSH) 0.9 %
10 SYRINGE (ML) INJECTION
Status: DISCONTINUED | OUTPATIENT
Start: 2022-04-19 | End: 2022-05-12 | Stop reason: HOSPADM

## 2022-04-19 RX ORDER — HYDROCODONE BITARTRATE AND ACETAMINOPHEN 500; 5 MG/1; MG/1
TABLET ORAL
Status: DISCONTINUED | OUTPATIENT
Start: 2022-04-19 | End: 2022-05-12 | Stop reason: HOSPADM

## 2022-04-19 RX ORDER — TALC
6 POWDER (GRAM) TOPICAL NIGHTLY
Status: DISCONTINUED | OUTPATIENT
Start: 2022-04-19 | End: 2022-05-12 | Stop reason: HOSPADM

## 2022-04-19 RX ORDER — THIAMINE HCL 100 MG
100 TABLET ORAL DAILY
Status: DISCONTINUED | OUTPATIENT
Start: 2022-04-19 | End: 2022-05-12 | Stop reason: HOSPADM

## 2022-04-19 RX ORDER — SODIUM CHLORIDE FOR INHALATION 3 %
4 VIAL, NEBULIZER (ML) INHALATION EVERY 8 HOURS
Status: DISCONTINUED | OUTPATIENT
Start: 2022-04-19 | End: 2022-04-24

## 2022-04-19 RX ORDER — FUROSEMIDE 10 MG/ML
40 INJECTION INTRAMUSCULAR; INTRAVENOUS EVERY 8 HOURS
Status: DISCONTINUED | OUTPATIENT
Start: 2022-04-19 | End: 2022-04-20

## 2022-04-19 RX ORDER — ENOXAPARIN SODIUM 100 MG/ML
40 INJECTION SUBCUTANEOUS EVERY 24 HOURS
Status: DISCONTINUED | OUTPATIENT
Start: 2022-04-19 | End: 2022-05-12 | Stop reason: HOSPADM

## 2022-04-19 RX ORDER — SODIUM CHLORIDE 0.9 % (FLUSH) 0.9 %
10 SYRINGE (ML) INJECTION
Status: DISCONTINUED | OUTPATIENT
Start: 2022-04-19 | End: 2022-04-19

## 2022-04-19 RX ORDER — FUROSEMIDE 10 MG/ML
40 INJECTION INTRAMUSCULAR; INTRAVENOUS ONCE
Status: DISCONTINUED | OUTPATIENT
Start: 2022-04-20 | End: 2022-04-19

## 2022-04-19 RX ORDER — OXYCODONE HCL 5 MG/5 ML
5 SOLUTION, ORAL ORAL EVERY 4 HOURS PRN
Status: DISCONTINUED | OUTPATIENT
Start: 2022-04-19 | End: 2022-05-12 | Stop reason: HOSPADM

## 2022-04-19 RX ORDER — CLOPIDOGREL BISULFATE 75 MG/1
75 TABLET ORAL DAILY
Status: DISCONTINUED | OUTPATIENT
Start: 2022-04-19 | End: 2022-05-12 | Stop reason: HOSPADM

## 2022-04-19 RX ORDER — ATORVASTATIN CALCIUM 20 MG/1
20 TABLET, FILM COATED ORAL DAILY
Status: DISCONTINUED | OUTPATIENT
Start: 2022-04-19 | End: 2022-05-12 | Stop reason: HOSPADM

## 2022-04-19 RX ORDER — GLYCOPYRROLATE 0.2 MG/ML
0.1 INJECTION INTRAMUSCULAR; INTRAVENOUS ONCE
Status: COMPLETED | OUTPATIENT
Start: 2022-04-19 | End: 2022-04-19

## 2022-04-19 RX ORDER — GLYCOPYRROLATE 1 MG/5ML
1 SOLUTION ORAL 3 TIMES DAILY
Status: DISCONTINUED | OUTPATIENT
Start: 2022-04-19 | End: 2022-04-26

## 2022-04-19 RX ADMIN — IPRATROPIUM BROMIDE AND ALBUTEROL SULFATE 3 ML: 2.5; .5 SOLUTION RESPIRATORY (INHALATION) at 07:04

## 2022-04-19 RX ADMIN — IPRATROPIUM BROMIDE AND ALBUTEROL SULFATE 3 ML: 2.5; .5 SOLUTION RESPIRATORY (INHALATION) at 11:04

## 2022-04-19 RX ADMIN — GLYCOPYRROLATE 1 MG: 1 LIQUID ORAL at 09:04

## 2022-04-19 RX ADMIN — SODIUM CHLORIDE SOLN NEBU 3% 4 ML: 3 NEBU SOLN at 07:04

## 2022-04-19 RX ADMIN — IPRATROPIUM BROMIDE AND ALBUTEROL SULFATE 3 ML: .5; 2.5 SOLUTION RESPIRATORY (INHALATION) at 09:04

## 2022-04-19 RX ADMIN — ENOXAPARIN SODIUM 40 MG: 40 INJECTION SUBCUTANEOUS at 04:04

## 2022-04-19 RX ADMIN — THIAMINE HCL TAB 100 MG 100 MG: 100 TAB at 02:04

## 2022-04-19 RX ADMIN — POLYETHYLENE GLYCOL 3350 17 G: 17 POWDER, FOR SOLUTION ORAL at 08:04

## 2022-04-19 RX ADMIN — ATORVASTATIN CALCIUM 20 MG: 20 TABLET, FILM COATED ORAL at 02:04

## 2022-04-19 RX ADMIN — GLYCOPYRROLATE 0.1 MG: 0.2 INJECTION INTRAMUSCULAR; INTRAVENOUS at 04:04

## 2022-04-19 RX ADMIN — MELATONIN TAB 3 MG 6 MG: 3 TAB at 08:04

## 2022-04-19 RX ADMIN — CEFEPIME HYDROCHLORIDE 2 G: 2 INJECTION, SOLUTION INTRAVENOUS at 10:04

## 2022-04-19 RX ADMIN — FOLIC ACID 1 MG: 1 TABLET ORAL at 02:04

## 2022-04-19 RX ADMIN — AZITHROMYCIN 500 MG: 500 INJECTION, POWDER, LYOPHILIZED, FOR SOLUTION INTRAVENOUS at 09:04

## 2022-04-19 RX ADMIN — FUROSEMIDE 40 MG: 10 INJECTION, SOLUTION INTRAMUSCULAR; INTRAVENOUS at 02:04

## 2022-04-19 RX ADMIN — SODIUM CHLORIDE, SODIUM LACTATE, POTASSIUM CHLORIDE, AND CALCIUM CHLORIDE 500 ML: .6; .31; .03; .02 INJECTION, SOLUTION INTRAVENOUS at 09:04

## 2022-04-19 RX ADMIN — ASPIRIN 81 MG CHEWABLE TABLET 81 MG: 81 TABLET CHEWABLE at 02:04

## 2022-04-19 RX ADMIN — FUROSEMIDE 40 MG: 10 INJECTION, SOLUTION INTRAMUSCULAR; INTRAVENOUS at 09:04

## 2022-04-19 RX ADMIN — CLOPIDOGREL 75 MG: 75 TABLET, FILM COATED ORAL at 02:04

## 2022-04-19 RX ADMIN — VANCOMYCIN HYDROCHLORIDE 1250 MG: 1.25 INJECTION, POWDER, LYOPHILIZED, FOR SOLUTION INTRAVENOUS at 11:04

## 2022-04-19 NOTE — PLAN OF CARE
[facility] - Cardiac Medical ICU  Initial Discharge Assessment       Primary Care Provider: Kodi Tubbs MD    Admission Diagnosis: Hyperkalemia [E87.5]  Hypercapnemia [R06.89]  SOB (shortness of breath) [R06.02]  Wheezes [R06.2]  Hypoxia [R09.02]    Admission Date: 4/19/2022  Expected Discharge Date: 4/26/2022    Discharge Barriers Identified: None    Payor: [employer]ARE / Plan: Adpeps 65 / Product Type: Medicare Advantage /     Extended Emergency Contact Information  Primary Emergency Contact: Bridgett Richard  Address: [address] [place] [address] [facility]  Home Phone: [phone]  Work Phone: [phone]  Mobile Phone: [phone]  Relation: Spouse    Discharge Plan A: Home Health  Discharge Plan B: Home with family      [id number] [place] - [address]  [address]  [place] [address]  Phone: [phone] Fax: [phone]    [id number] [place] [address]  [address]  [place] [address]  Phone: [phone] Fax: [phone]      Transferred from:     Past Medical History:   Diagnosis Date    Cancer     skin to Rt forearm and face    COPD (chronic obstructive pulmonary disease)     Hyperlipidemia     Hypertension     Pseudoaneurysm          CM met with patient and Bridgettalana Richard (spouse) [phone] in room for Discharge Planning Assessment.  Patient was unable to answer questions due receiving bedside care and non-verbal at baseline.  Per spouse, patient lives with her in a 1-story home with 5 step(s) to enter.   Per spouse, patient was dependent with ADLS and used no DME for ambulation.  Per spouse, patient is not on dialysis and does not take Coumadin. Patient will have help from Bridgett Richard (spouse) [phone] upon discharge.   Discharge Planning Booklet given to patient/family and discussed.  All questions addressed.  CM will follow for needs.      Initial Assessment (most recent)      Adult Discharge Assessment - 04/19/22 1511        Discharge Assessment    Assessment Type Discharge Planning Assessment     Confirmed/corrected address, phone number and insurance Yes     Confirmed Demographics Correct on Facesheet     Source of Information family     When was your last doctors appointment? 04/18/22     Communicated NISA with patient/caregiver Date not available/Unable to determine     Reason For Admission hyperkalemia     Lives With spouse     Facility Arrived From: Home     Do you expect to return to your current living situation? Yes     Do you have help at home or someone to help you manage your care at home? Yes     Who are your caregiver(s) and their phone number(s)? Bridgett Richard (spouse) [phone]     Prior to hospitilization cognitive status: Alert/Oriented     Current cognitive status: Unable to Assess   patient receiving bedside care    Walking or Climbing Stairs Difficulty none     Dressing/Bathing Difficulty bathing difficulty, assistance 1 person;dressing difficulty, assistance 1 person     Dressing/Bathing Management spouse assists in these tasks     Equipment Currently Used at Home none;hospital bed;oxygen;suction machine;rollator;walker, standard;shower chair;other (see comments)   oxygen concentrator and does not use rollator or std walker.    Readmission within 30 days? No     Patient currently being followed by outpatient case management? No     Do you currently have service(s) that help you manage your care at home? Yes     Name and Contact number of agency [facility]     Is the pt/caregiver preference to resume services with current agency Yes     Do you take prescription medications? Yes     Do you have prescription coverage? Yes     Coverage [employer] - [employer] 65     Do you have any problems affording any of your prescribed medications? No     Is the patient taking medications as prescribed? yes     Who is going to help you get  home at discharge? Bridgett Richard (spouse) [phone]     How do you get to doctors appointments? family or friend will provide     Are you on dialysis? No     Do you take coumadin? No     Discharge Plan A Home Health     Discharge Plan B Home with family     DME Needed Upon Discharge  other (see comments)   TBD    Discharge Barriers Identified None        Relationship/Environment    Name(s) of Who Lives With Patient Bridgett Richard (spouse) [phone]                        PCP:  Kodi Tubsb MD  [phone]        Pharmacy:    [id number] [place] - [address]  [address]  [place] [address]  Phone: [phone] Fax: [phone]    [id number] [place] - [address]  [address]  [place] [address]  Phone: [phone] Fax: [phone]        Emergency Contacts:  Extended Emergency Contact Information  Primary Emergency Contact: Bridgett Richard  Address: [address] [place] [address] [facility]  Home Phone: [phone]  Work Phone: [phone]  Mobile Phone: [phone]  Relation: Spouse      Insurance:    Payor: [employer]ARE / Plan: Sec[employer] 65 / Product Type: Medicare Advantage /     Madina Bernard RN     [phone]      04/19/2022  3:30 PM

## 2022-04-19 NOTE — ED NOTES
Arrived by EMS r/t increased lethargy, SOB starting yesterday.  Hx tongue CA, COPD. Mucous from trach. On 5 L/min O2 at baseline. Was scheduled to get xray today to rule out pneumonia. Last chemo tx one week ago. Radiation yesterday. Nonverbal at baseline.      Patient identifiers for Fareed Richard Jr. 72 y.o. male checked and correct.  Chief Complaint   Patient presents with    Shortness of Breath     Increased lethargy after chemo yesterday. Hx tongue CA, COPD. Trach dependent, O2 dependent @ 5 L/min. Wheezing, rhonci, 94% upon EMS arrival.      Past Medical History:   Diagnosis Date    Cancer     skin to Rt forearm and face    COPD (chronic obstructive pulmonary disease)     Hyperlipidemia     Hypertension     Pseudoaneurysm      Allergies reported: Review of patient's allergies indicates:  No Known Allergies      LOC: Patient is awake, alert, and aware of environment with an appropriate affect. Patient is oriented x 4. Nonverbal at baseline  APPEARANCE: Patient resting comfortably and in no acute distress. Patient is clean and well groomed, patient's clothing is properly fastened.  HEENT: Scarring from past surgical procedures.   SKIN: The skin is warm and dry. Patient has normal skin turgor and moist mucus membranes.   MUSKULOSKELETAL: Patient is moving all extremities well, no obvious deformities noted. Pulses intact.   RESPIRATORY:  Respirations are spontaneous and non-labored with icreased effort and rate. On trach collar O2 at 5 L/min baseline. Rhonci all fields. Mucous to trach opening  CARDIAC: Patient has a normal rate and rhythm. No peripheral edema noted.   ABDOMEN: No distention noted. Soft and non-tender upon palpation. PEG tube, site free from signs of infection.   NEUROLOGICAL: pupils 3mm, PERRL. Facial expression is symmetrical. Hand grasps are equal bilaterally. Normal sensation in all extremities when touched with finger.

## 2022-04-19 NOTE — ED PROVIDER NOTES
Encounter Date: 4/19/2022       History     Chief Complaint   Patient presents with    Shortness of Breath     Increased lethargy after chemo yesterday. Hx tongue CA, COPD. Trach dependent, O2 dependent @ 5 L/min. Wheezing, rhonci, 94% upon EMS arrival.      The history is provided by the patient, the spouse, medical records and the EMS personnel.     72-year-old male with past medical history of squamous cell carcinoma of the tongue status post resection and flap with tracheostomy, COPD, hypertension, who presents by EMS with complaint of shortness of breath and lethargy.    Per wife after chemo yesterday patient has been more lethargic.  He has also had decreased urine output.  This morning he started to have increased wheezing and rhonchi.  Upon EMS arrival he was on his home 5 L by trach collar and saturating 93% with significant wheezing.  He received 1 DuoNeb by them.  He was also suctioned.    Currently patient is alert, following commands.  He is nonverbal but able to answer yes no questions by shaking his head.  He denies any chest pain but does endorse shortness of breath and wheezing.  He denies any abdominal pain.  Wife also notes increased yellow thick secretions from trach site.      Review of patient's allergies indicates:  No Known Allergies  Past Medical History:   Diagnosis Date    Cancer     skin to Rt forearm and face    COPD (chronic obstructive pulmonary disease)     Hyperlipidemia     Hypertension     Pseudoaneurysm      Past Surgical History:   Procedure Laterality Date    ABDOMINAL SURGERY      stents placed in liver and large intestines, per patient    CAROTID STENT      CORONARY STENT PLACEMENT  01/2000    DISSECTION OF NECK Bilateral 1/4/2022    Procedure: DISSECTION, NECK;  Surgeon: Naeem Smalls MD;  Location[facility] OR [address] [place] [address];  Service: ENT;  Laterality: Bilateral;    ESOPHAGOGASTRODUODENOSCOPY W/ PEG N/A 1/4/2022    Procedure: EGD, WITH PEG TUBE INSERTION;  Surgeon:  Anthony Calabrese MD;  [address];  Service: General;  Laterality: N/A;    EYE SURGERY      Cataract bilateral    femoral stents      bilateral    FLAP PROCEDURE N/A 1/3/2022    Procedure: CREATION, FREE FLAP;  Surgeon: Naeem Smalls MD;  [facility];  Service: ENT;  Laterality: N/A;    FLAP PROCEDURE Right 2022    Procedure: CREATION, FREE FLAP;  Surgeon: Stacey Conde MD;  [facility];  Service: ENT;  Laterality: Right;  Ischemic start 1203  Ischemic stop 1353    GLOSSECTOMY N/A 2022    Procedure: GLOSSECTOMY;  Surgeon: Naeem Smalls MD;  [facility];  Service: ENT;  Laterality: N/A;    RADICAL NECK DISSECTION Left 1/3/2022    Procedure: DISSECTION, NECK, RADICAL;  Surgeon: Naeem Smalls MD;  Location: [facility] OR [address] [place] [address];  Service: ENT;  Laterality: Left;    SKIN CANCER EXCISION      TRACHEOTOMY N/A 2022    Procedure: TRACHEOTOMY;  Surgeon: Naeem Smalls MD;  Location: [facility] OR [address] [place] [address];  Service: ENT;  Laterality: N/A;     No family history on file.  Social History     Tobacco Use    Smoking status: Former Smoker     Packs/day: 2.00     Years: 40.00     Pack years: 80.00     Types: Cigarettes     Start date: 1963     Quit date: 2018     Years since quittin.0    Smokeless tobacco: Never Used    Tobacco comment: 3/3 ppd x 40 yrs. Currently 3-4 cigarettes daily .He is trying  to quit. Is using a Vapor cigarettes  2-3 x's a day.   Substance Use Topics    Alcohol use: Yes     Alcohol/week: 3.0 standard drinks     Types: 3 Cans of beer per week     Comment: beer daily 3-4    Drug use: No     Review of Systems   Constitutional: Positive for fatigue. Negative for chills and fever.   HENT: Negative for sore throat.    Respiratory: Positive for shortness of breath and wheezing.    Cardiovascular: Negative for chest pain.   Gastrointestinal: Negative for abdominal pain, nausea and vomiting.    Genitourinary: Negative for dysuria and flank pain.        Decreased urination   Musculoskeletal: Negative for back pain.   Skin: Negative for rash.   Neurological: Negative for weakness and headaches.   Hematological: Does not bruise/bleed easily.       Physical Exam     Initial Vitals [04/19/22 0848]   BP Pulse Resp Temp SpO2   122/60 82 (!) 26 97.7 °F (36.5 °C) (!) 92 %      MAP       --         Physical Exam    Nursing note and vitals reviewed.  Constitutional: He appears well-developed. He is not diaphoretic.   Patient is alert in mild respiratory distress.   HENT:   Head: Normocephalic and atraumatic.   Tracheostomy in place with thick yellow secretions.   Eyes: EOM are normal. Pupils are equal, round, and reactive to light.   Neck:   Normal range of motion.  Cardiovascular: Normal rate, regular rhythm, normal heart sounds and intact distal pulses.   No murmur heard.  Pulmonary/Chest: He is in respiratory distress. He has wheezes. He has rhonchi. He has no rales.   Abdominal: Abdomen is soft. He exhibits no distension. There is no abdominal tenderness.   G-tube in place.   Musculoskeletal:         General: Normal range of motion.      Cervical back: Normal range of motion.     Neurological: He is alert and oriented to person, place, and time.   Skin: Skin is warm and dry.   Psychiatric: He has a normal mood and affect.         ED Course   Procedures  Labs Reviewed   CBC W/ AUTO DIFFERENTIAL - Abnormal; Notable for the following components:       Result Value    RBC 2.82 (*)     Hemoglobin 8.2 (*)     Hematocrit 27.6 (*)     MCHC 29.7 (*)     RDW 17.7 (*)     MPV 8.8 (*)     Immature Granulocytes 1.5 (*)     Immature Grans (Abs) 0.08 (*)     Lymph # 0.3 (*)     Lymph % 5.4 (*)     Mono % 19.6 (*)     All other components within normal limits   COMPREHENSIVE METABOLIC PANEL - Abnormal; Notable for the following components:    Sodium 131 (*)     Potassium 5.6 (*)     Chloride 86 (*)     CO2 35 (*)     Glucose  123 (*)     Albumin 2.5 (*)     All other components within normal limits   B-TYPE NATRIURETIC PEPTIDE - Abnormal; Notable for the following components:    BNP 1,607 (*)     All other components within normal limits    Narrative:     Add on BNP pre SHAKEEL PZA III, MD  04/19/2022  10:32 [id number]   ISTAT PROCEDURE - Abnormal; Notable for the following components:    POC Glucose 128 (*)     POC Sodium 128 (*)     POC Potassium 5.5 (*)     POC Chloride 84 (*)     POC TCO2 (MEASURED) 38 (*)     POC Hematocrit 26 (*)     All other components within normal limits   ISTAT PROCEDURE - Abnormal; Notable for the following components:    POC PH 7.232 (*)     POC PCO2 101.5 (*)     POC PO2 16 (*)     POC HCO3 42.7 (*)     POC SATURATED O2 14 (*)     POC TCO2 46 (*)     All other components within normal limits   ISTAT PROCEDURE - Abnormal; Notable for the following components:    POC PH 7.263 (*)     POC PCO2 87.3 (*)     POC PO2 25 (*)     POC HCO3 39.5 (*)     POC SATURATED O2 34 (*)     POC TCO2 42 (*)     All other components within normal limits   APTT   LACTIC ACID, PLASMA   PROCALCITONIN    Narrative:     Add on BNP pre SHAKEEL PAZ III, MD  04/19/2022  10:32 [id number]   TROPONIN I   B-TYPE NATRIURETIC PEPTIDE   SARS-COV-2 RDRP GENE    Narrative:     This test utilizes isothermal nucleic acid amplification   technology to detect the SARS-CoV-2 RdRp nucleic acid segment.   The analytical sensitivity (limit of detection) is 125 genome   equivalents/mL.   A POSITIVE result implies infection with the SARS-CoV-2 virus;   the patient is presumed to be contagious.     A NEGATIVE result means that SARS-CoV-2 nucleic acids are not   present above the limit of detection. A NEGATIVE result should be   treated as presumptive. It does not rule out the possibility of   COVID-19 and should not be the sole basis for treatment decisions.   If COVID-19 is strongly suspected based on clinical and exposure   history, re-testing  using an alternate molecular assay should be   considered.   This test is only for use under the Food and Drug   Administration s Emergency Use Authorization (EUA).   Commercial kits are provided by [employer].   Performance characteristics of the EUA have been independently   verified by [facility] Department of   Pathology and Laboratory Medicine.   _________________________________________________________________   The authorized Fact Sheet for Healthcare Providers and the authorized Fact   Sheet for Patients of the ID NOW COVID-19 are available on the FDA   website:     https://www.fda.gov/media/485146/download  https://www.fda.gov/media/490352/download          POCT INFLUENZA A/B MOLECULAR   ISTAT CHEM8        ECG Results          EKG 12-lead (Final result)  Result time 04/19/22 17:42:02    Final result by Interface, Lab In [facility] (04/19/22 17:42:02)                 Narrative:    Test Reason : R06.02,    Vent. Rate : 081 BPM     Atrial Rate : 081 BPM     P-R Int : 158 ms          QRS Dur : 092 ms      QT Int : 366 ms       P-R-T Axes : 061 -70 045 degrees     QTc Int : 425 ms    Normal sinus rhythm  Left axis deviation Now present  Incomplete right bundle branch block  Abnormal ECG  When compared with ECG of 10-MADONNA-2022 10:11,    Confirmed by DIONNE SINGH MD ([id number]) on 4/19/2022 5:41:52 PM    Referred By: [email or web address]   SELF           Confirmed By:DIONNE SINGH MD                            Imaging Results          X-Ray Chest AP Portable (Final result)  Result time 04/19/22 09:44:00    Final result by Vahid Gaytan III, MD (04/19/22 09:44:00)                 Impression:      New onset cardiomegaly and edema.  This could be CHF less likely pneumonia.      Electronically signed by: Vahid Gaytan MD  Date:    04/19/2022  Time:    09:44             Narrative:    EXAMINATION:  XR CHEST AP PORTABLE    CLINICAL HISTORY:  Sepsis;    FINDINGS:  Chest one view: Trach tube tip T3.  There is  cardiomegaly, aortic plaque, moderate-severe edema, pleural fluid, and DJD.                                 Medications   sodium chloride 0.9% flush 10 mL (has no administration in time range)   enoxaparin injection 40 mg (40 mg Subcutaneous Given 4/19/22 1639)   cefTRIAXone (ROCEPHIN) 2 g/50 mL D5W IVPB (has no administration in time range)   azithromycin 500 mg in dextrose 5 % 250 mL IVPB (ready to mix system) (0 mg Intravenous Stopped 4/20/22 1024)   albuterol-ipratropium 2.5 mg-0.5 mg/3 mL nebulizer solution 3 mL (3 mLs Nebulization Given 4/20/22 1200)   methylPREDNISolone sodium succinate injection 40 mg (40 mg Intravenous Given 4/20/22 0801)   albuterol-ipratropium 2.5 mg-0.5 mg/3 mL nebulizer solution 3 mL (has no administration in time range)   aspirin chewable tablet 81 mg (81 mg Per G Tube Given 4/20/22 0800)   atorvastatin tablet 20 mg (20 mg Per G Tube Given 4/20/22 0801)   clopidogreL tablet 75 mg (75 mg Per G Tube Given 4/20/22 0800)   duke's soln (benadryl 30 mL, mylanta 30 mL, LIDOcaine 30 mL, nystatin 30 mL) 120 mL (has no administration in time range)   bisacodyL suppository 10 mg (has no administration in time range)   folic acid tablet 1 mg (1 mg Per G Tube Given 4/20/22 0801)   melatonin tablet 6 mg (6 mg Per G Tube Given 4/19/22 2045)   polyethylene glycol packet 17 g (17 g Per G Tube Given 4/20/22 0801)   sodium chloride 0.65 % nasal spray 1 spray (has no administration in time range)   thiamine tablet 100 mg (100 mg Per G Tube Given 4/20/22 0801)   ondansetron disintegrating tablet 8 mg (has no administration in time range)   oxyCODONE 5 mg/5 mL solution 5 mg (has no administration in time range)   sodium chloride 3% nebulizer solution 4 mL (4 mLs Nebulization Given 4/20/22 0726)   0.9%  NaCl infusion (for blood administration) (has no administration in time range)   furosemide injection 40 mg (40 mg Intravenous Given 4/20/22 0531)   glycopyrrolate 1 mg/5 mL (0.2 mg/mL) oral solution 1 mg (1  mg Per G Tube Given 4/20/22 0801)   vancomycin 1.25 g in dextrose 5% 250 mL IVPB (ready to mix) ( Intravenous Stopped 4/19/22 1244)   lactated ringers bolus 500 mL (0 mLs Intravenous Stopped 4/19/22 1110)   cefepime in dextrose 5 % IVPB 2 g (0 g Intravenous Stopped 4/19/22 1109)   azithromycin 500 mg in dextrose 5 % 250 mL IVPB (ready to mix system) (0 mg Intravenous Stopped 4/19/22 1109)   albuterol-ipratropium 2.5 mg-0.5 mg/3 mL nebulizer solution 3 mL (3 mLs Nebulization Given 4/19/22 0930)   furosemide injection 40 mg (40 mg Intravenous Given 4/19/22 1458)   glycopyrrolate injection 0.1 mg (0.1 mg Intravenous Given 4/19/22 1635)   magnesium sulfate 2g in water 50mL IVPB (premix) (0 g Intravenous Paused 4/20/22 0853)     Medical Decision Making:   History:   Old Medical Records: I decided to obtain old medical records.  Old Records Summarized: records from previous admission(s).  Initial Assessment:   72-year-old male with past medical history of squamous cell carcinoma of the tongue status post resection and flap with tracheostomy, COPD, hypertension, who presents by EMS with complaint of shortness of breath and lethargy.  Patient completed chemo yesterday.  On exam patient is in respiratory distress with significant wheezes as well as tracheal secretions.  He is alert and following commands.  Patient was quickly suctioned in trauma [place].    Differential Diagnosis:   Pneumonia, mucus plugging, viral illness, COPD exacerbation, fluid overload.  Based on my exam most likely would be either pneumonia versus COPD exacerbation.  Clinical Tests:   Lab Tests: Ordered and Reviewed  Radiological Study: Reviewed and Ordered  Medical Tests: Ordered and Reviewed  ED Management:  Broad workup ordered.  Patient was initiated on broad-spectrum antibiotics for potential pneumonia he was only given 500 cc of fluid as given his multiple comorbidities do feel clinically that 30 cc/kg would be potentially harmful. Duoneb for COPD.  His white count was 5, he is afebrile.  Chest x-ray more concerning for possible fluid overload.  A VBG notable for acidosis of 7.2 with PC of 2 of 100. This is indicating hypercapnic hypoxic respiratory failure.  Patient was placed on ventilation via BiPAP there is trach tube.  Patient also found to be hyperkalemic at 5.5.  Given patient's acute status medical ICU was consulted for admission.  Other:   I have discussed this case with another health care provider.            Attending Attestation:   Physician Attestation Statement for Resident:  As the supervising MD   Physician Attestation Statement: I have personally seen and examined this patient.   I agree with the above history. -:   As the supervising MD I agree with the above PE.    As the supervising MD I agree with the above treatment, course, plan, and disposition.   -: Pt had rapidly decompensating resp failure, and failed PS hrough his trach.  Pt had hyercapneic resp failure and was place on a vent and resp were increased to 26 to blow off his CO2.  Pt responded well to his and was admitted to ICU.  I have reviewed and agree with the residents interpretation of the following: lab data and x-rays.  I have reviewed the following: old records at this facility and old x-rays.                       Critical Care:  Date: 04/20/2022  Performed by: Roberto Valenzuela III, MD   Authorized by: Roberto Valenzuela III, MD    Total critical care time (exclusive of procedural time) : 37 minutes  Critical care was necessary to treat or prevent imminent or life-threatening deterioration of the following conditions:  hypercpaneic respiratory failure  Clinical Impression:   Final diagnoses:  [R06.02] SOB (shortness of breath)  [R09.02] Hypoxia (Primary)  [R06.89] Hypercapnemia  [E87.5] Hyperkalemia  [R06.2] Wheezes          ED Disposition Condition    Admit               Omar Roberts MD  Resident  04/19/22 1130       Roberto Valenzuela III, MD  04/20/22 1228

## 2022-04-19 NOTE — NURSING
Patient arrived from the RN in stretcher with RN and RTTx2. Patient was placed on continuous cardiac monitoring and vent was set up to trach with Spontaneous mode. Team made aware of patient arrival. BLAKE BEAL.

## 2022-04-19 NOTE — ASSESSMENT & PLAN NOTE
12/15/21 FNA left neck mass : squamous cell carcinoma, p16 negative  1/4/22 total glossectomy, bilateral neck dissection, bilateral cervical facial advancement flaps and anterolateral thigh free flap reconstruction of his glossectomy defect.  Final path :  [id number] (+microscopic SALINAS, single contralateral node), superior soft tissue margin of left neck with tumor.  2/28/22 start chemoradiation  Finished chemo with cisplatin 4/6. Was going to complete final dose of radiation tomorrow.

## 2022-04-19 NOTE — HPI
Mr. 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 Per wife he has had several days of increasing lethargy, increased work of breathing and secretions. Yesterday she became particularly concerned when he became slightly less responsive and interactive. Wife also notes increased yellow thick secretions from trach site. Upon EMS arrival he was on his home 5 L by trach collar and saturating 93% with significant wheezing.  He received 1 DuoNeb by them.  He was also suctioned.     Currently patient is alert, following commands.  He is responding to yes no questions.  He denies any chest pain but does endorse shortness of breath and cough.  He denies any abdominal pain or pain elsewhere      ED course:   He was given 500ccs IVF as well as one time doses of vanc/cefepime/azithro. Labs significant for BNP of 1600, WBC 5k, K 5.6, Bicarb 35, VBG 7.26/87/25/40. Trop wnl. CXR with new cardiomegaly and pulmonary edema. Suctioned with return of copious secretions. ICU was consulted for acute hypoxemic respiratory failure and he will be admitted for further management.

## 2022-04-19 NOTE — ASSESSMENT & PLAN NOTE
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 He is on 5L with trach collar at home. He has had increased yellow secretions, work of breathing and SOB for the past several days. BNP elevated to 1600 on admission with pulmonary edema and new cardiomegaly on CXR. Trop wnl. Bicarb is elevated suggesting chronic hypercapnia. Suctioning has cleared thick mucous. He was able to be weaned down to his home O2, however after ~ 20 minutes he desats again, requiring deep suction with resolution of hypoxia.     - CAP coverage with ceftriaxone/azithro  - Methylprednisolone 40 mg x5 days for possible element of COPD exacerbation  - S/p lasix 40 mg with output of ~700cc in the few hours after  - Echo to assess cardiac fxn  - Duonebs q4  - Hypertonic saline nebs  - Chest PT  - Suction PRN

## 2022-04-19 NOTE — H&P
[facility] - Cardiac Medical ICU  Critical Care Medicine  History & Physical    Patient Name: Fareed Richard Jr.  MRN: [id number]  Admission Date: 4/19/2022  Hospital Length of Stay: 0 days  Code Status: Full Code  Attending Physician: Pema Cárdenas MD   Primary Care Provider: Kodi Tubbs MD   Principal Problem: Acute on chronic respiratory failure    Subjective:     HPI:  Mr. 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 Per wife he has had several days of increasing lethargy, increased work of breathing and secretions. Yesterday she became particularly concerned when he became slightly less responsive and interactive. Wife also notes increased yellow thick secretions from trach site. Upon EMS arrival he was on his home 5 L by trach collar and saturating 93% with significant wheezing.  He received 1 DuoNeb by them.  He was also suctioned.     Currently patient is alert, following commands.  He is responding to yes no questions.  He denies any chest pain but does endorse shortness of breath and cough.  He denies any abdominal pain or pain elsewhere      ED course:   He was given 500ccs IVF as well as one time doses of vanc/cefepime/azithro. Labs significant for BNP of 1600, WBC 5k, K 5.6, Bicarb 35, VBG 7.26/87/25/40. Trop wnl. CXR with new cardiomegaly and pulmonary edema. Suctioned with return of copious secretions. ICU was consulted for acute hypoxemic respiratory failure and he will be admitted for further management.      Hospital/ICU Course:  No notes on file     Past Medical History:   Diagnosis Date    Cancer     skin to Rt forearm and face    COPD (chronic obstructive pulmonary disease)     Hyperlipidemia     Hypertension     Pseudoaneurysm        Past Surgical History:   Procedure Laterality Date    ABDOMINAL SURGERY      stents placed  in liver and large intestines, per patient    CAROTID STENT      CORONARY STENT PLACEMENT  2000    DISSECTION OF NECK Bilateral 2022    Procedure: DISSECTION, NECK;  Surgeon: Naeem Smalls MD;  Location: [facility] OR [facility];  Service: ENT;  Laterality: Bilateral;    ESOPHAGOGASTRODUODENOSCOPY W/ PEG N/A 2022    Procedure: EGD, WITH PEG TUBE INSERTION;  Surgeon: Anthony Calabrese MD;  [facility];  Service: General;  Laterality: N/A;    EYE SURGERY      Cataract bilateral    femoral stents      bilateral    FLAP PROCEDURE N/A 1/3/2022    Procedure: CREATION, FREE FLAP;  Surgeon: Naeem Smalls MD;  [facility];  Service: ENT;  Laterality: N/A;    FLAP PROCEDURE Right 2022    Procedure: CREATION, FREE FLAP;  Surgeon: Stacey Conde MD;  [facility];  Service: ENT;  Laterality: Right;  Ischemic start 1203  Ischemic stop 1353    GLOSSECTOMY N/A 2022    Procedure: GLOSSECTOMY;  Surgeon: Naeem Smalls MD;  [facility];  Service: ENT;  Laterality: N/A;    RADICAL NECK DISSECTION Left 1/3/2022    Procedure: DISSECTION, NECK, RADICAL;  Surgeon: Naeem Smalls MD;  [facility];  Service: ENT;  Laterality: Left;    SKIN CANCER EXCISION      TRACHEOTOMY N/A 2022    Procedure: TRACHEOTOMY;  Surgeon: Naeem Smalls MD;  [facility];  Service: ENT;  Laterality: N/A;       Review of patient's allergies indicates:  No Known Allergies    Family History    None       Tobacco Use    Smoking status: Former Smoker     Packs/day: 2.00     Years: 40.00     Pack years: 80.00     Types: Cigarettes     Start date: 1963     Quit date: 2018     Years since quittin.0    Smokeless tobacco: Never Used    Tobacco comment: 3/3 ppd x 40 yrs. Currently 3-4 cigarettes daily .He is trying  to quit. Is using a Vapor cigarettes  2-3 x's a day.   Substance and Sexual Activity    Alcohol use:  Yes     Alcohol/week: 3.0 standard drinks     Types: 3 Cans of beer per week     Comment: beer daily 3-4    Drug use: No    Sexual activity: Not Currently      Review of Systems   Constitutional:  Positive for chills and fatigue. Negative for fever.   HENT:  Positive for voice change. Negative for congestion and sore throat.    Respiratory:  Positive for cough, choking and shortness of breath. Negative for chest tightness.    Cardiovascular:  Negative for chest pain, palpitations and leg swelling.   Gastrointestinal:  Negative for abdominal pain, constipation, diarrhea, nausea and vomiting.   Genitourinary:  Negative for dysuria, flank pain and hematuria.   Musculoskeletal:  Negative for back pain.   Skin:  Negative for color change and rash.   Allergic/Immunologic: Positive for immunocompromised state.   Neurological:  Negative for light-headedness and headaches.   Objective:     Vital Signs (Most Recent):  Temp: 99.5 °F (37.5 °C) (04/19/22 1300)  Pulse: 81 (04/19/22 1300)  Resp: 15 (04/19/22 1300)  BP: 116/72 (04/19/22 1300)  SpO2: 99 % (04/19/22 1300) Vital Signs (24h Range):  Temp:  [97.7 °F (36.5 °C)-99.5 °F (37.5 °C)] 99.5 °F (37.5 °C)  Pulse:  [79-88] 81  Resp:  [15-26] 15  SpO2:  [91 %-100 %] 99 %  BP: ()/(51-72) 116/72   Weight: 62 kg (136 lb 11 oz)  Body mass index is 20.78 kg/m².      Intake/Output Summary (Last 24 hours) at 4/19/2022 1412  Last data filed at 4/19/2022 1244  Gross per 24 hour   Intake 1000.07 ml   Output --   Net 1000.07 ml       Physical Exam  Constitutional:       General: He is not in acute distress.     Appearance: He is ill-appearing. He is not toxic-appearing.   HENT:      Head: Normocephalic and atraumatic.      Nose: No congestion.      Mouth/Throat:      Pharynx: No oropharyngeal exudate or posterior oropharyngeal erythema.   Eyes:      General: No scleral icterus.     Conjunctiva/sclera: Conjunctivae normal.   Cardiovascular:      Rate and Rhythm: Normal rate and regular  rhythm.   Pulmonary:      Effort: No respiratory distress.      Breath sounds: Rhonchi present.      Comments: Sitting upright, normal respiratory rate, in no acute distress  Audible secretions  Abdominal:      General: There is no distension.      Tenderness: There is no abdominal tenderness. There is no guarding.      Comments: No suprapubic tenderness to palpation   Musculoskeletal:         General: No swelling.      Right lower leg: No edema.      Left lower leg: No edema.   Skin:     General: Skin is warm.      Coloration: Skin is not jaundiced.   Neurological:      General: No focal deficit present.      Mental Status: He is alert.      Comments: Answers yes/no to questions appropriately and follows commands     Vents:  Vent Mode: SPN-CPAP +PS (04/19/22 1030)  Pressure Support: 5 cmH20 (04/19/22 1030)  PEEP/CPAP: 5 cmH20 (04/19/22 1030)  Oxygen Concentration (%): 40 (04/19/22 1033)  Peak Airway Pressure: 10 cmH2O (04/19/22 1030)  Total Ve: 2.27 mL (04/19/22 1030)  Lines/Drains/Airways       Drain  Duration                  Gastrostomy/Enterostomy 01/04/22 Percutaneous endoscopic gastrostomy (PEG)  days              Airway  Duration                  Surgical Airway 01/04/22 1546 Shiley Cuffed 104 days              Peripheral Intravenous Line  Duration                  Peripheral IV - Single Lumen 04/19/22 0920 20 G Right Antecubital <1 day                  Significant Labs:    CBC/Anemia Profile:  Recent Labs   Lab 04/19/22  0925 04/19/22  0927   WBC 5.21  --    HGB 8.2*  --    HCT 27.6* 26*     --    MCV 98  --    RDW 17.7*  --       Chemistries:  Recent Labs   Lab 04/19/22  0925   *   K 5.6*   CL 86*   CO2 35*   BUN 16   CREATININE 0.5   CALCIUM 9.4   ALBUMIN 2.5*   PROT 6.1   BILITOT 0.2   ALKPHOS 82   ALT 17   AST 16     All pertinent labs within the past 24 hours have been reviewed.    Significant Imaging: I have reviewed all pertinent imaging results/findings within the past 24  hours.    Assessment/Plan:     Pulmonary  * Acute on chronic respiratory failure  Pt with SCC of the tongue s/p total glossectomy, chemowith cisplatin, and nearing completion of radiation with trach, COPD, asthma, HTN, CAD s/p PCI presenting for acute on chronic hypoxemic respiratory failure. He is on 5L with trach collar at home. He has had increased yellow secretions, work of breathing and SOB for the past several days. BNP elevated to 1600 on admission with pulmonary edema and new cardiomegaly on CXR. Trop wnl. Bicarb is elevated suggesting chronic hypercapnia. Suctioning has cleared thick mucous. He was able to be weaned down to his home O2, however after ~ 20 minutes he desats again, requiring deep suction with resolution of hypoxia.     - CAP coverage with ceftriaxone/azithro  - Methylprednisolone 40 mg x5 days for possible element of COPD exacerbation  - S/p lasix 40 mg with output of ~700cc in the few hours after  - Echo to assess cardiac fxn  - Duonebs q4  - Hypertonic saline nebs  - Chest PT  - Suction PRN    Cardiac/Vascular  Other hyperlipidemia  -Continue home statin    Primary hypertension  Holding home anti-hypertensives, will resume later    Coronary artery disease involving native coronary artery of native heart without angina pectoris  Continue ASA, plavix, and statin    Oncology  Squamous cell cancer of tongue  12/15/21 FNA left neck mass : squamous cell carcinoma, p16 negative  1/4/22 total glossectomy, bilateral neck dissection, bilateral cervical facial advancement flaps and anterolateral thigh free flap reconstruction of his glossectomy defect.  Final path :  [id number] (+microscopic SALINAS, single contralateral node), superior soft tissue margin of left neck with tumor.  2/28/22 start chemoradiation  Finished chemo with cisplatin 4/6. Was going to complete final dose of radiation tomorrow.            Critical Care Daily Checklist:    A: Awake: RASS Goal/Actual Goal:    Actual: Johnson Agitation  Sedation Scale (RASS): Alert and calm   B: Spontaneous Breathing Trial Performed?     C: SAT & SBT Coordinated?  NA                      D: Delirium: CAM-ICU Overall CAM-ICU: Negative   E: Early Mobility Performed? Yes   F: Feeding Goal:    Status:     Current Diet Order   Procedures    Diet NPO Except for: Medication     Order Specific Question:   Except for     Answer:   Medication      AS: Analgesia/Sedation Oxy PRN home med   T: Thromboembolic Prophylaxis Lovenox   H: HOB > 300 Yes   U: Stress Ulcer Prophylaxis (if needed) NA   G: Glucose Control Yes   B: Bowel Function     I: Indwelling Catheter (Lines & Lainez) Necessity Keep   D: De-escalation of Antimicrobials/Pharmacotherapies No    Plan for the day/ETD Wean down to home O2, continue CAP coverage    Code Status:  Family/Goals of Care: Full Code         Critical secondary to Patient has a condition that poses threat to life and bodily function: Acute on chronic hypoxemic respiratory failure     Critical care was time spent personally by me on the following activities: development of treatment plan with patient or surrogate and bedside caregivers, discussions with consultants, evaluation of patient's response to treatment, examination of patient, ordering and performing treatments and interventions, ordering and review of laboratory studies, ordering and review of radiographic studies, pulse oximetry, re-evaluation of patient's condition. This critical care time did not overlap with that of any other provider or involve time for any procedures.     Jonathan Leroy MD  Critical Care Medicine  [facility] - Cardiac Medical ICU

## 2022-04-19 NOTE — SUBJECTIVE & OBJECTIVE
Past Medical History:   Diagnosis Date    Cancer     skin to Rt forearm and face    COPD (chronic obstructive pulmonary disease)     Hyperlipidemia     Hypertension     Pseudoaneurysm        Past Surgical History:   Procedure Laterality Date    ABDOMINAL SURGERY      stents placed in liver and large intestines, per patient    CAROTID STENT      CORONARY STENT PLACEMENT  01/2000    DISSECTION OF NECK Bilateral 1/4/2022    Procedure: DISSECTION, NECK;  Surgeon: Naeem Smalls MD;  Location: [facility] OR [address] [place] [address];  Service: ENT;  Laterality: Bilateral;    ESOPHAGOGASTRODUODENOSCOPY W/ PEG N/A 1/4/2022    Procedure: EGD, WITH PEG TUBE INSERTION;  Surgeon: Anthony Calabrese MD;  Location: [facility] OR [address] [place] [address];  Service: General;  Laterality: N/A;    EYE SURGERY      Cataract bilateral    femoral stents      bilateral    FLAP PROCEDURE N/A 1/3/2022    Procedure: CREATION, FREE FLAP;  Surgeon: Naeem Smalls MD;  Location: [facility] OR [address] [place] [address];  Service: ENT;  Laterality: N/A;    FLAP PROCEDURE Right 1/4/2022    Procedure: CREATION, FREE FLAP;  Surgeon: Stacey Conde MD;  [facility];  Service: ENT;  Laterality: Right;  Ischemic start 1203  Ischemic stop 1353    GLOSSECTOMY N/A 1/4/2022    Procedure: GLOSSECTOMY;  Surgeon: Naeem Smalls MD;  Location: [facility] OR [address] [place] [address];  Service: ENT;  Laterality: N/A;    RADICAL NECK DISSECTION Left 1/3/2022    Procedure: DISSECTION, NECK, RADICAL;  Surgeon: Naeem Smalls MD;  Location: [facility] OR [address] [place] [address];  Service: ENT;  Laterality: Left;    SKIN CANCER EXCISION      TRACHEOTOMY N/A 1/4/2022    Procedure: TRACHEOTOMY;  Surgeon: Naeem Smlals MD;  Location: [facility] OR [address] [place] [address];  Service: ENT;  Laterality: N/A;       Review of patient's allergies indicates:  No Known Allergies    Family History    None       Tobacco Use    Smoking status: Former Smoker     Packs/day: 2.00     Years: 40.00     Pack years: 80.00     Types: Cigarettes     Start date: 4/17/1963     Quit  date: 2018     Years since quittin.0    Smokeless tobacco: Never Used    Tobacco comment: 3/3 ppd x 40 yrs. Currently 3-4 cigarettes daily .He is trying  to quit. Is using a Vapor cigarettes  2-3 x's a day.   Substance and Sexual Activity    Alcohol use: Yes     Alcohol/week: 3.0 standard drinks     Types: 3 Cans of beer per week     Comment: beer daily 3-4    Drug use: No    Sexual activity: Not Currently      Review of Systems   Constitutional:  Positive for chills and fatigue. Negative for fever.   HENT:  Positive for voice change. Negative for congestion and sore throat.    Respiratory:  Positive for cough, choking and shortness of breath. Negative for chest tightness.    Cardiovascular:  Negative for chest pain, palpitations and leg swelling.   Gastrointestinal:  Negative for abdominal pain, constipation, diarrhea, nausea and vomiting.   Genitourinary:  Negative for dysuria, flank pain and hematuria.   Musculoskeletal:  Negative for back pain.   Skin:  Negative for color change and rash.   Allergic/Immunologic: Positive for immunocompromised state.   Neurological:  Negative for light-headedness and headaches.   Objective:     Vital Signs (Most Recent):  Temp: 99.5 °F (37.5 °C) (22 1300)  Pulse: 81 (22 1300)  Resp: 15 (22 1300)  BP: 116/72 (22 1300)  SpO2: 99 % (22 1300) Vital Signs (24h Range):  Temp:  [97.7 °F (36.5 °C)-99.5 °F (37.5 °C)] 99.5 °F (37.5 °C)  Pulse:  [79-88] 81  Resp:  [15-26] 15  SpO2:  [91 %-100 %] 99 %  BP: ()/(51-72) 116/72   Weight: 62 kg (136 lb 11 oz)  Body mass index is 20.78 kg/m².      Intake/Output Summary (Last 24 hours) at 2022 1412  Last data filed at 2022 1244  Gross per 24 hour   Intake 1000.07 ml   Output --   Net 1000.07 ml       Physical Exam  Constitutional:       General: He is not in acute distress.     Appearance: He is ill-appearing. He is not toxic-appearing.   HENT:      Head: Normocephalic and atraumatic.       Nose: No congestion.      Mouth/Throat:      Pharynx: No oropharyngeal exudate or posterior oropharyngeal erythema.   Eyes:      General: No scleral icterus.     Conjunctiva/sclera: Conjunctivae normal.   Cardiovascular:      Rate and Rhythm: Normal rate and regular rhythm.   Pulmonary:      Effort: No respiratory distress.      Breath sounds: Rhonchi present.      Comments: Sitting upright, normal respiratory rate, in no acute distress  Audible secretions  Abdominal:      General: There is no distension.      Tenderness: There is no abdominal tenderness. There is no guarding.      Comments: No suprapubic tenderness to palpation   Musculoskeletal:         General: No swelling.      Right lower leg: No edema.      Left lower leg: No edema.   Skin:     General: Skin is warm.      Coloration: Skin is not jaundiced.   Neurological:      General: No focal deficit present.      Mental Status: He is alert.      Comments: Answers yes/no to questions appropriately and follows commands     Vents:  Vent Mode: SPN-CPAP +PS (04/19/22 1030)  Pressure Support: 5 cmH20 (04/19/22 1030)  PEEP/CPAP: 5 cmH20 (04/19/22 1030)  Oxygen Concentration (%): 40 (04/19/22 1033)  Peak Airway Pressure: 10 cmH2O (04/19/22 1030)  Total Ve: 2.27 mL (04/19/22 1030)  Lines/Drains/Airways       Drain  Duration                  Gastrostomy/Enterostomy 01/04/22 Percutaneous endoscopic gastrostomy (PEG)  days              Airway  Duration                  Surgical Airway 01/04/22 1546 Shiley Cuffed 104 days              Peripheral Intravenous Line  Duration                  Peripheral IV - Single Lumen 04/19/22 0920 20 G Right Antecubital <1 day                  Significant Labs:    CBC/Anemia Profile:  Recent Labs   Lab 04/19/22  0925 04/19/22  0927   WBC 5.21  --    HGB 8.2*  --    HCT 27.6* 26*     --    MCV 98  --    RDW 17.7*  --       Chemistries:  Recent Labs   Lab 04/19/22  0925   *   K 5.6*   CL 86*   CO2 35*   BUN 16    CREATININE 0.5   CALCIUM 9.4   ALBUMIN 2.5*   PROT 6.1   BILITOT 0.2   ALKPHOS 82   ALT 17   AST 16     All pertinent labs within the past 24 hours have been reviewed.    Significant Imaging: I have reviewed all pertinent imaging results/findings within the past 24 hours.

## 2022-04-19 NOTE — CONSULTS
Pt examined in the ED by Critical Care Medicine. He will be admitted to MICU for further management.         Jonathan Leroy MD  Internal Medicine PGY1

## 2022-04-20 LAB
ALBUMIN SERPL BCP-MCNC: 2.5 G/DL (ref 3.5–5.2)
ALP SERPL-CCNC: 81 U/L (ref 55–135)
ALT SERPL W/O P-5'-P-CCNC: 15 U/L (ref 10–44)
ANION GAP SERPL CALC-SCNC: 14 MMOL/L (ref 8–16)
AST SERPL-CCNC: 20 U/L (ref 10–40)
BASOPHILS # BLD AUTO: 0.03 K/UL (ref 0–0.2)
BASOPHILS NFR BLD: 0.8 % (ref 0–1.9)
BILIRUB SERPL-MCNC: 0.5 MG/DL (ref 0.1–1)
BILIRUB UR QL STRIP: NEGATIVE
BLD PROD TYP BPU: NORMAL
BLOOD UNIT EXPIRATION DATE: NORMAL
BLOOD UNIT TYPE CODE: 5100
BLOOD UNIT TYPE: NORMAL
BUN SERPL-MCNC: 13 MG/DL (ref 8–23)
CALCIUM SERPL-MCNC: 8.8 MG/DL (ref 8.7–10.5)
CHLORIDE SERPL-SCNC: 87 MMOL/L (ref 95–110)
CLARITY UR REFRACT.AUTO: CLEAR
CO2 SERPL-SCNC: 33 MMOL/L (ref 23–29)
CODING SYSTEM: NORMAL
COLOR UR AUTO: NORMAL
CREAT SERPL-MCNC: 0.6 MG/DL (ref 0.5–1.4)
DIFFERENTIAL METHOD: ABNORMAL
DISPENSE STATUS: NORMAL
EOSINOPHIL # BLD AUTO: 0 K/UL (ref 0–0.5)
EOSINOPHIL NFR BLD: 0 % (ref 0–8)
ERYTHROCYTE [DISTWIDTH] IN BLOOD BY AUTOMATED COUNT: 16.9 % (ref 11.5–14.5)
EST. GFR  (AFRICAN AMERICAN): >60 ML/MIN/1.73 M^2
EST. GFR  (NON AFRICAN AMERICAN): >60 ML/MIN/1.73 M^2
GLUCOSE SERPL-MCNC: 100 MG/DL (ref 70–110)
GLUCOSE UR QL STRIP: NEGATIVE
HCT VFR BLD AUTO: 28.6 % (ref 40–54)
HGB BLD-MCNC: 9.2 G/DL (ref 14–18)
HGB UR QL STRIP: NEGATIVE
IMM GRANULOCYTES # BLD AUTO: 0.05 K/UL (ref 0–0.04)
IMM GRANULOCYTES NFR BLD AUTO: 1.3 % (ref 0–0.5)
KETONES UR QL STRIP: NEGATIVE
LEUKOCYTE ESTERASE UR QL STRIP: NEGATIVE
LYMPHOCYTES # BLD AUTO: 0.5 K/UL (ref 1–4.8)
LYMPHOCYTES NFR BLD: 11.6 % (ref 18–48)
MAGNESIUM SERPL-MCNC: 1.6 MG/DL (ref 1.6–2.6)
MCH RBC QN AUTO: 28.9 PG (ref 27–31)
MCHC RBC AUTO-ENTMCNC: 32.2 G/DL (ref 32–36)
MCV RBC AUTO: 90 FL (ref 82–98)
MONOCYTES # BLD AUTO: 0.9 K/UL (ref 0.3–1)
MONOCYTES NFR BLD: 21.8 % (ref 4–15)
NEUTROPHILS # BLD AUTO: 2.6 K/UL (ref 1.8–7.7)
NEUTROPHILS NFR BLD: 64.5 % (ref 38–73)
NITRITE UR QL STRIP: NEGATIVE
NRBC BLD-RTO: 0 /100 WBC
NUM UNITS TRANS PACKED RBC: NORMAL
PH UR STRIP: 8 [PH] (ref 5–8)
PHOSPHATE SERPL-MCNC: 3.8 MG/DL (ref 2.7–4.5)
PLATELET # BLD AUTO: 261 K/UL (ref 150–450)
PMV BLD AUTO: 8.7 FL (ref 9.2–12.9)
POTASSIUM SERPL-SCNC: 4 MMOL/L (ref 3.5–5.1)
PROT SERPL-MCNC: 5.4 G/DL (ref 6–8.4)
PROT UR QL STRIP: NEGATIVE
RBC # BLD AUTO: 3.18 M/UL (ref 4.6–6.2)
SODIUM SERPL-SCNC: 134 MMOL/L (ref 136–145)
SP GR UR STRIP: 1 (ref 1–1.03)
URN SPEC COLLECT METH UR: NORMAL
WBC # BLD AUTO: 3.95 K/UL (ref 3.9–12.7)

## 2022-04-20 PROCEDURE — 25000242 PHARM REV CODE 250 ALT 637 W/ HCPCS: Performed by: STUDENT IN AN ORGANIZED HEALTH CARE EDUCATION/TRAINING PROGRAM

## 2022-04-20 PROCEDURE — 36430 TRANSFUSION BLD/BLD COMPNT: CPT

## 2022-04-20 PROCEDURE — 27000221 HC OXYGEN, UP TO 24 HOURS

## 2022-04-20 PROCEDURE — 25000003 PHARM REV CODE 250: Performed by: INTERNAL MEDICINE

## 2022-04-20 PROCEDURE — 25000003 PHARM REV CODE 250: Performed by: STUDENT IN AN ORGANIZED HEALTH CARE EDUCATION/TRAINING PROGRAM

## 2022-04-20 PROCEDURE — 94761 N-INVAS EAR/PLS OXIMETRY MLT: CPT

## 2022-04-20 PROCEDURE — 20000000 HC ICU ROOM

## 2022-04-20 PROCEDURE — P9016 RBC LEUKOCYTES REDUCED: HCPCS | Performed by: STUDENT IN AN ORGANIZED HEALTH CARE EDUCATION/TRAINING PROGRAM

## 2022-04-20 PROCEDURE — 83735 ASSAY OF MAGNESIUM: CPT

## 2022-04-20 PROCEDURE — 63600175 PHARM REV CODE 636 W HCPCS: Performed by: STUDENT IN AN ORGANIZED HEALTH CARE EDUCATION/TRAINING PROGRAM

## 2022-04-20 PROCEDURE — 99900026 HC AIRWAY MAINTENANCE (STAT)

## 2022-04-20 PROCEDURE — 84100 ASSAY OF PHOSPHORUS: CPT

## 2022-04-20 PROCEDURE — 99233 SBSQ HOSP IP/OBS HIGH 50: CPT | Mod: ,,, | Performed by: INTERNAL MEDICINE

## 2022-04-20 PROCEDURE — 99900035 HC TECH TIME PER 15 MIN (STAT)

## 2022-04-20 PROCEDURE — 94640 AIRWAY INHALATION TREATMENT: CPT

## 2022-04-20 PROCEDURE — 63600175 PHARM REV CODE 636 W HCPCS: Performed by: EMERGENCY MEDICINE

## 2022-04-20 PROCEDURE — 94668 MNPJ CHEST WALL SBSQ: CPT

## 2022-04-20 PROCEDURE — 80053 COMPREHEN METABOLIC PANEL: CPT

## 2022-04-20 PROCEDURE — 63600175 PHARM REV CODE 636 W HCPCS: Performed by: INTERNAL MEDICINE

## 2022-04-20 PROCEDURE — 99233 PR SUBSEQUENT HOSPITAL CARE,LEVL III: ICD-10-PCS | Mod: ,,, | Performed by: INTERNAL MEDICINE

## 2022-04-20 PROCEDURE — 27200966 HC CLOSED SUCTION SYSTEM

## 2022-04-20 PROCEDURE — 85025 COMPLETE CBC W/AUTO DIFF WBC: CPT | Performed by: STUDENT IN AN ORGANIZED HEALTH CARE EDUCATION/TRAINING PROGRAM

## 2022-04-20 PROCEDURE — 25000242 PHARM REV CODE 250 ALT 637 W/ HCPCS: Performed by: INTERNAL MEDICINE

## 2022-04-20 RX ORDER — MAGNESIUM SULFATE HEPTAHYDRATE 40 MG/ML
2 INJECTION, SOLUTION INTRAVENOUS ONCE
Status: COMPLETED | OUTPATIENT
Start: 2022-04-20 | End: 2022-04-20

## 2022-04-20 RX ADMIN — POLYETHYLENE GLYCOL 3350 17 G: 17 POWDER, FOR SOLUTION ORAL at 08:04

## 2022-04-20 RX ADMIN — GLYCOPYRROLATE 1 MG: 1 LIQUID ORAL at 10:04

## 2022-04-20 RX ADMIN — METHYLPREDNISOLONE SODIUM SUCCINATE 40 MG: 40 INJECTION, POWDER, FOR SOLUTION INTRAMUSCULAR; INTRAVENOUS at 08:04

## 2022-04-20 RX ADMIN — ASPIRIN 81 MG CHEWABLE TABLET 81 MG: 81 TABLET CHEWABLE at 08:04

## 2022-04-20 RX ADMIN — FUROSEMIDE 40 MG: 10 INJECTION, SOLUTION INTRAMUSCULAR; INTRAVENOUS at 01:04

## 2022-04-20 RX ADMIN — SODIUM CHLORIDE SOLN NEBU 3% 4 ML: 3 NEBU SOLN at 07:04

## 2022-04-20 RX ADMIN — ATORVASTATIN CALCIUM 20 MG: 20 TABLET, FILM COATED ORAL at 08:04

## 2022-04-20 RX ADMIN — IPRATROPIUM BROMIDE AND ALBUTEROL SULFATE 3 ML: 2.5; .5 SOLUTION RESPIRATORY (INHALATION) at 04:04

## 2022-04-20 RX ADMIN — CLOPIDOGREL 75 MG: 75 TABLET, FILM COATED ORAL at 08:04

## 2022-04-20 RX ADMIN — GLYCOPYRROLATE 1 MG: 1 LIQUID ORAL at 08:04

## 2022-04-20 RX ADMIN — MELATONIN TAB 3 MG 6 MG: 3 TAB at 09:04

## 2022-04-20 RX ADMIN — MAGNESIUM SULFATE 2 G: 2 INJECTION INTRAVENOUS at 06:04

## 2022-04-20 RX ADMIN — THIAMINE HCL TAB 100 MG 100 MG: 100 TAB at 08:04

## 2022-04-20 RX ADMIN — SODIUM CHLORIDE SOLN NEBU 3% 4 ML: 3 NEBU SOLN at 10:04

## 2022-04-20 RX ADMIN — IPRATROPIUM BROMIDE AND ALBUTEROL SULFATE 3 ML: 2.5; .5 SOLUTION RESPIRATORY (INHALATION) at 08:04

## 2022-04-20 RX ADMIN — FUROSEMIDE 40 MG: 10 INJECTION, SOLUTION INTRAMUSCULAR; INTRAVENOUS at 05:04

## 2022-04-20 RX ADMIN — FOLIC ACID 1 MG: 1 TABLET ORAL at 08:04

## 2022-04-20 RX ADMIN — IPRATROPIUM BROMIDE AND ALBUTEROL SULFATE 3 ML: 2.5; .5 SOLUTION RESPIRATORY (INHALATION) at 12:04

## 2022-04-20 RX ADMIN — IPRATROPIUM BROMIDE AND ALBUTEROL SULFATE 3 ML: 2.5; .5 SOLUTION RESPIRATORY (INHALATION) at 03:04

## 2022-04-20 RX ADMIN — SODIUM CHLORIDE SOLN NEBU 3% 4 ML: 3 NEBU SOLN at 01:04

## 2022-04-20 RX ADMIN — IPRATROPIUM BROMIDE AND ALBUTEROL SULFATE 3 ML: 2.5; .5 SOLUTION RESPIRATORY (INHALATION) at 07:04

## 2022-04-20 RX ADMIN — GLYCOPYRROLATE 1 MG: 1 LIQUID ORAL at 02:04

## 2022-04-20 RX ADMIN — AZITHROMYCIN 500 MG: 500 INJECTION, POWDER, LYOPHILIZED, FOR SOLUTION INTRAVENOUS at 09:04

## 2022-04-20 RX ADMIN — CEFTRIAXONE 2 G: 2 INJECTION, SOLUTION INTRAVENOUS at 01:04

## 2022-04-20 RX ADMIN — ENOXAPARIN SODIUM 40 MG: 40 INJECTION SUBCUTANEOUS at 05:04

## 2022-04-20 NOTE — PLAN OF CARE
Patient seen with housestaff team on morning rounds and then plan of care was discussed in detail.     72 year old man with history of SCC of the tongue s/p glossectomy and flap w/tracheostomy, COPD, and HTN admitted to ICU for managemenet of acute hypercapnic respiratory failure.     · Acute on chronic hypercapnic respiratory failure: resolved. Likely 2/2 infection.  Continue coverage with ceftraixone and azithromycin.  · Volume overload/Elevated BNP: Improved with diuresis.  Monitor I's/O's and maintain a net negative balance.   · SCC of the tongue: undergoing chemo with cisplatin (4 of 6 completed) as well as radiation (last dose scheduled for 4/20)  · CAD: continue GDMT  · Anemia: Hgb upon admit was 6.5.  Transfused 1 unit RBCs overnight.  Hgb this morning was 9.2.  No active signs of bleeding. Continue to monitor and transfuse as necessary maintain hgb>7.

## 2022-04-20 NOTE — ASSESSMENT & PLAN NOTE
Nutrition Problem:  Severe Protein-Calorie Malnutrition  Malnutrition in the context of Chronic Illness/Injury    Related to (etiology):  Inability to consume sufficient energy    Signs and Symptoms (as evidenced by):  Body Fat Depletion: severe depletion of orbitals   Muscle Mass Depletion: severe depletion of temples, clavicle region and lower extremities   Weight Loss: 15% x 6 months    Interventions(treatment strategy):  Collaboration of nutrition care w/ other providers  Enteral nutrition     Nutrition Diagnosis Status:  New

## 2022-04-20 NOTE — PLAN OF CARE
CMICU DAILY GOALS       A: Awake    RASS: Goal - RASS Goal: 0-->alert and calm  Actual - RASS (Johnson Agitation-Sedation Scale): 0-->alert and calm   Restraint necessity:    B: Breathe   SBT: Not intubated   C: Coordinate A & B, analgesics/sedatives   Pain: managed    SAT: Not intubated  D: Delirium   CAM-ICU: Overall CAM-ICU: Negative  E: Early(intubated/ Progressive (non-intubated) Mobility   MOVE Screen: Fail   Activity: Activity Management: Rolling - L1  FAS: Feeding/Nutrition   Diet order: Diet/Nutrition Received: NPO,    T: Thrombus   DVT prophylaxis: VTE Required Core Measure: Patient refused interventions  H: HOB Elevation   Head of Bed (HOB) Positioning: HOB elevated  U: Ulcer Prophylaxis   GI: no  G: Glucose control   managed    S: Skin   Bathing/Skin Care: linen changed, incontinence care, bath, partial  Device Skin Pressure Protection: absorbent pad utilized/changed, adhesive use limited, positioning supports utilized  Pressure Reduction Devices: specialty bed utilized, foam padding utilized  Pressure Reduction Techniques: weight shift assistance provided, pressure points protected  Skin Protection: adhesive use limited, incontinence pads utilized, protective footwear used, pulse oximeter probe site changed, tubing/devices free from skin contact, transparent dressing maintained  B: Bowel Function   no issues   I: Indwelling Catheters   Lainez necessity:     CVC necessity: No  D: De-escalation Antibiotics   No    Family/Goals of care/Code Status   Code Status: Full Code    24H Vital Sign Range  Temp:  [97.7 °F (36.5 °C)-99.8 °F (37.7 °C)]   Pulse:  []   Resp:  [15-54]   BP: ()/(51-80)   SpO2:  [85 %-100 %]      Shift Events   No acute events throughout shift. Pt on 10L at 40% trach collar, tolerating well. Pt secretions decreasing throughout shift. Frequent use of urinal with lasix administration. 1U PRBC transfused for Hgb of 6.5; awaiting results of recollection post transfusion. No  transfusion reaction noted. Pt denies pain/distress. WCTM.    VS and assessment per flow sheet, patient progressing towards goals as tolerated, plan of care reviewed with Fareed Richard Jr., all concerns addressed, will continue to monitor.    Ora Sow, KELBYN, RN

## 2022-04-20 NOTE — PLAN OF CARE
Recommendations     1. Modify current TF regimen to pt's home TF regimen of Isosource 1.5 - 6 cans/day = 2250 kcals, 102 g of protein, 1146 mL fluid.  2. RD to monitor & follow-up.     Goals: Meet % EEN, EPN by RD f/u date  Nutrition Goal Status: new  Communication of RD Recs: reviewed with RN

## 2022-04-21 PROBLEM — E87.6 HYPOKALEMIA: Status: ACTIVE | Noted: 2022-04-21

## 2022-04-21 PROBLEM — R13.10 DYSPHAGIA: Status: ACTIVE | Noted: 2022-04-21

## 2022-04-21 LAB
ALBUMIN SERPL BCP-MCNC: 2.5 G/DL (ref 3.5–5.2)
ALP SERPL-CCNC: 76 U/L (ref 55–135)
ALT SERPL W/O P-5'-P-CCNC: 15 U/L (ref 10–44)
ANION GAP SERPL CALC-SCNC: 10 MMOL/L (ref 8–16)
AST SERPL-CCNC: 16 U/L (ref 10–40)
BASOPHILS # BLD AUTO: 0.01 K/UL (ref 0–0.2)
BASOPHILS NFR BLD: 0.2 % (ref 0–1.9)
BILIRUB SERPL-MCNC: 0.2 MG/DL (ref 0.1–1)
BUN SERPL-MCNC: 24 MG/DL (ref 8–23)
CALCIUM SERPL-MCNC: 9.1 MG/DL (ref 8.7–10.5)
CHLORIDE SERPL-SCNC: 85 MMOL/L (ref 95–110)
CO2 SERPL-SCNC: 40 MMOL/L (ref 23–29)
CREAT SERPL-MCNC: 0.7 MG/DL (ref 0.5–1.4)
DIFFERENTIAL METHOD: ABNORMAL
EOSINOPHIL # BLD AUTO: 0 K/UL (ref 0–0.5)
EOSINOPHIL NFR BLD: 0 % (ref 0–8)
ERYTHROCYTE [DISTWIDTH] IN BLOOD BY AUTOMATED COUNT: 16.7 % (ref 11.5–14.5)
EST. GFR  (AFRICAN AMERICAN): >60 ML/MIN/1.73 M^2
EST. GFR  (NON AFRICAN AMERICAN): >60 ML/MIN/1.73 M^2
GLUCOSE SERPL-MCNC: 129 MG/DL (ref 70–110)
HCT VFR BLD AUTO: 29.7 % (ref 40–54)
HGB BLD-MCNC: 9.8 G/DL (ref 14–18)
IMM GRANULOCYTES # BLD AUTO: 0.04 K/UL (ref 0–0.04)
IMM GRANULOCYTES NFR BLD AUTO: 0.8 % (ref 0–0.5)
LYMPHOCYTES # BLD AUTO: 0.5 K/UL (ref 1–4.8)
LYMPHOCYTES NFR BLD: 10 % (ref 18–48)
MAGNESIUM SERPL-MCNC: 2.2 MG/DL (ref 1.6–2.6)
MCH RBC QN AUTO: 29 PG (ref 27–31)
MCHC RBC AUTO-ENTMCNC: 33 G/DL (ref 32–36)
MCV RBC AUTO: 88 FL (ref 82–98)
MONOCYTES # BLD AUTO: 1 K/UL (ref 0.3–1)
MONOCYTES NFR BLD: 20.9 % (ref 4–15)
NEUTROPHILS # BLD AUTO: 3.3 K/UL (ref 1.8–7.7)
NEUTROPHILS NFR BLD: 68.1 % (ref 38–73)
NRBC BLD-RTO: 0 /100 WBC
PHOSPHATE SERPL-MCNC: 4.4 MG/DL (ref 2.7–4.5)
PLATELET # BLD AUTO: 287 K/UL (ref 150–450)
PMV BLD AUTO: 8.5 FL (ref 9.2–12.9)
POTASSIUM SERPL-SCNC: 3.4 MMOL/L (ref 3.5–5.1)
PROT SERPL-MCNC: 6.2 G/DL (ref 6–8.4)
RBC # BLD AUTO: 3.38 M/UL (ref 4.6–6.2)
SODIUM SERPL-SCNC: 135 MMOL/L (ref 136–145)
WBC # BLD AUTO: 4.88 K/UL (ref 3.9–12.7)

## 2022-04-21 PROCEDURE — 77014 PR  CT GUIDANCE PLACEMENT RAD THERAPY FIELDS: CPT | Mod: 26,,, | Performed by: STUDENT IN AN ORGANIZED HEALTH CARE EDUCATION/TRAINING PROGRAM

## 2022-04-21 PROCEDURE — 63600175 PHARM REV CODE 636 W HCPCS: Performed by: EMERGENCY MEDICINE

## 2022-04-21 PROCEDURE — 27200966 HC CLOSED SUCTION SYSTEM

## 2022-04-21 PROCEDURE — 99900026 HC AIRWAY MAINTENANCE (STAT)

## 2022-04-21 PROCEDURE — 99233 PR SUBSEQUENT HOSPITAL CARE,LEVL III: ICD-10-PCS | Mod: ,,, | Performed by: INTERNAL MEDICINE

## 2022-04-21 PROCEDURE — 77014 HC CT GUIDANCE RADIATION THERAPY FLDS PLACEMENT: CPT | Mod: TC | Performed by: STUDENT IN AN ORGANIZED HEALTH CARE EDUCATION/TRAINING PROGRAM

## 2022-04-21 PROCEDURE — 27000221 HC OXYGEN, UP TO 24 HOURS

## 2022-04-21 PROCEDURE — 85025 COMPLETE CBC W/AUTO DIFF WBC: CPT | Performed by: STUDENT IN AN ORGANIZED HEALTH CARE EDUCATION/TRAINING PROGRAM

## 2022-04-21 PROCEDURE — 77386 HC IMRT, COMPLEX: CPT | Performed by: STUDENT IN AN ORGANIZED HEALTH CARE EDUCATION/TRAINING PROGRAM

## 2022-04-21 PROCEDURE — 94761 N-INVAS EAR/PLS OXIMETRY MLT: CPT

## 2022-04-21 PROCEDURE — 63600175 PHARM REV CODE 636 W HCPCS: Performed by: INTERNAL MEDICINE

## 2022-04-21 PROCEDURE — 77014 PR  CT GUIDANCE PLACEMENT RAD THERAPY FIELDS: ICD-10-PCS | Mod: 26,,, | Performed by: STUDENT IN AN ORGANIZED HEALTH CARE EDUCATION/TRAINING PROGRAM

## 2022-04-21 PROCEDURE — 84100 ASSAY OF PHOSPHORUS: CPT

## 2022-04-21 PROCEDURE — 63600175 PHARM REV CODE 636 W HCPCS

## 2022-04-21 PROCEDURE — 25000003 PHARM REV CODE 250: Performed by: STUDENT IN AN ORGANIZED HEALTH CARE EDUCATION/TRAINING PROGRAM

## 2022-04-21 PROCEDURE — 25000242 PHARM REV CODE 250 ALT 637 W/ HCPCS: Performed by: INTERNAL MEDICINE

## 2022-04-21 PROCEDURE — 25000003 PHARM REV CODE 250

## 2022-04-21 PROCEDURE — 94640 AIRWAY INHALATION TREATMENT: CPT

## 2022-04-21 PROCEDURE — 83735 ASSAY OF MAGNESIUM: CPT

## 2022-04-21 PROCEDURE — 80053 COMPREHEN METABOLIC PANEL: CPT

## 2022-04-21 PROCEDURE — 20000000 HC ICU ROOM

## 2022-04-21 PROCEDURE — 99233 SBSQ HOSP IP/OBS HIGH 50: CPT | Mod: ,,, | Performed by: INTERNAL MEDICINE

## 2022-04-21 PROCEDURE — 25000003 PHARM REV CODE 250: Performed by: INTERNAL MEDICINE

## 2022-04-21 PROCEDURE — 99900035 HC TECH TIME PER 15 MIN (STAT)

## 2022-04-21 PROCEDURE — 94668 MNPJ CHEST WALL SBSQ: CPT

## 2022-04-21 PROCEDURE — 25000242 PHARM REV CODE 250 ALT 637 W/ HCPCS: Performed by: STUDENT IN AN ORGANIZED HEALTH CARE EDUCATION/TRAINING PROGRAM

## 2022-04-21 RX ADMIN — GLYCOPYRROLATE 1 MG: 1 LIQUID ORAL at 08:04

## 2022-04-21 RX ADMIN — IPRATROPIUM BROMIDE AND ALBUTEROL SULFATE 3 ML: 2.5; .5 SOLUTION RESPIRATORY (INHALATION) at 08:04

## 2022-04-21 RX ADMIN — IPRATROPIUM BROMIDE AND ALBUTEROL SULFATE 3 ML: 2.5; .5 SOLUTION RESPIRATORY (INHALATION) at 05:04

## 2022-04-21 RX ADMIN — IPRATROPIUM BROMIDE AND ALBUTEROL SULFATE 3 ML: 2.5; .5 SOLUTION RESPIRATORY (INHALATION) at 12:04

## 2022-04-21 RX ADMIN — ENOXAPARIN SODIUM 40 MG: 40 INJECTION SUBCUTANEOUS at 04:04

## 2022-04-21 RX ADMIN — ATORVASTATIN CALCIUM 20 MG: 20 TABLET, FILM COATED ORAL at 08:04

## 2022-04-21 RX ADMIN — SODIUM CHLORIDE SOLN NEBU 3% 4 ML: 3 NEBU SOLN at 10:04

## 2022-04-21 RX ADMIN — AZITHROMYCIN 500 MG: 500 INJECTION, POWDER, LYOPHILIZED, FOR SOLUTION INTRAVENOUS at 08:04

## 2022-04-21 RX ADMIN — GLYCOPYRROLATE 1 MG: 1 LIQUID ORAL at 09:04

## 2022-04-21 RX ADMIN — MELATONIN TAB 3 MG 6 MG: 3 TAB at 09:04

## 2022-04-21 RX ADMIN — SODIUM CHLORIDE SOLN NEBU 3% 4 ML: 3 NEBU SOLN at 07:04

## 2022-04-21 RX ADMIN — CEFTRIAXONE 2 G: 2 INJECTION, SOLUTION INTRAVENOUS at 11:04

## 2022-04-21 RX ADMIN — FOLIC ACID 1 MG: 1 TABLET ORAL at 08:04

## 2022-04-21 RX ADMIN — ASPIRIN 81 MG CHEWABLE TABLET 81 MG: 81 TABLET CHEWABLE at 08:04

## 2022-04-21 RX ADMIN — IPRATROPIUM BROMIDE AND ALBUTEROL SULFATE 3 ML: 2.5; .5 SOLUTION RESPIRATORY (INHALATION) at 07:04

## 2022-04-21 RX ADMIN — CLOPIDOGREL 75 MG: 75 TABLET, FILM COATED ORAL at 08:04

## 2022-04-21 RX ADMIN — SODIUM CHLORIDE SOLN NEBU 3% 4 ML: 3 NEBU SOLN at 02:04

## 2022-04-21 RX ADMIN — GLYCOPYRROLATE 1 MG: 1 LIQUID ORAL at 04:04

## 2022-04-21 RX ADMIN — METHYLPREDNISOLONE SODIUM SUCCINATE 40 MG: 40 INJECTION, POWDER, FOR SOLUTION INTRAMUSCULAR; INTRAVENOUS at 08:04

## 2022-04-21 RX ADMIN — THIAMINE HCL TAB 100 MG 100 MG: 100 TAB at 08:04

## 2022-04-21 RX ADMIN — POTASSIUM BICARBONATE 40 MEQ: 391 TABLET, EFFERVESCENT ORAL at 08:04

## 2022-04-21 RX ADMIN — IPRATROPIUM BROMIDE AND ALBUTEROL SULFATE 3 ML: 2.5; .5 SOLUTION RESPIRATORY (INHALATION) at 04:04

## 2022-04-21 NOTE — HOSPITAL COURSE
Admitted to ICU, started on CAP coverage and solumedrol. Quickly weaned off vent after return of copious secretions on suction. Has been maintaining sats well on home O2. Rad/onc consulted for completion of radiology while inpatient. Pending culture sensitivity.

## 2022-04-21 NOTE — PLAN OF CARE
CMICU DAILY GOALS       A: Awake    RASS: Goal - RASS Goal: 0-->alert and calm  Actual - RASS (Johnson Agitation-Sedation Scale): 0-->alert and calm   Restraint necessity:    B: Breathe   SBT: Not intubated   C: Coordinate A & B, analgesics/sedatives   Pain: managed    SAT: Not intubated  D: Delirium   CAM-ICU: Overall CAM-ICU: Negative  E: Early(intubated/ Progressive (non-intubated) Mobility   MOVE Screen: Pass   Activity: Activity Management: Rolling - L1  FAS: Feeding/Nutrition   Diet order: Diet/Nutrition Received: NPO, tube feeding,    T: Thrombus   DVT prophylaxis: VTE Required Core Measure: Patient refused interventions  H: HOB Elevation   Head of Bed (HOB) Positioning: HOB elevated  U: Ulcer Prophylaxis   GI: yes  G: Glucose control   managed    S: Skin   Bathing/Skin Care: linen changed  Device Skin Pressure Protection: adhesive use limited  Pressure Reduction Devices: specialty bed utilized  Pressure Reduction Techniques: weight shift assistance provided  Skin Protection: adhesive use limited  B: Bowel Function   constipation   I: Indwelling Catheters   Lainez necessity:     CVC necessity: Yes  D: De-escalation Antibiotics   Yes    Family/Goals of care/Code Status   Code Status: Full Code    24H Vital Sign Range  Temp:  [96.9 °F (36.1 °C)-99.2 °F (37.3 °C)]   Pulse:  []   Resp:  [18-61]   BP: ()/(63-95)   SpO2:  [93 %-99 %]      Shift Events   No acute events throughout shift    VS and assessment per flow sheet, patient progressing towards goals as tolerated, plan of care reviewed with pt, all concerns addressed, will continue to monitor.    Lg Lau

## 2022-04-21 NOTE — ASSESSMENT & PLAN NOTE
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 He is on 5L with trach collar at home. He has had increased yellow secretions, work of breathing and SOB for the past several days. BNP elevated to 1600 on admission with pulmonary edema and new cardiomegaly on CXR. Trop wnl. Bicarb is elevated suggesting chronic hypercapnia. Suctioning has cleared thick mucous. He was able to be weaned down to his home O2, however after ~ 20 minutes he desats again, requiring deep suction with resolution of hypoxia. Now stable on home O2. Diuresed -3L      -Pt stable on home O2 for 24 hours, ready to stepdown to floor  - CAP coverage with ceftriaxone/azithro  - Methylprednisolone 40 mg x5 days for possible element of COPD exacerbation  - Duonebs q4  - Hypertonic saline nebs  - Chest PT  - Suction PRN

## 2022-04-21 NOTE — HOSPITAL COURSE
[facility] NPO due to dyshpagia and ST following, 95% , 4 liters has trach.  Pt has poor truncal control and needs ambulance for transportation.  Pt was approved for rehab. Will dc today.

## 2022-04-21 NOTE — PROGRESS NOTES
[facility] - Cardiac Medical ICU  Critical Care Medicine  Progress Note    Patient Name: Fareed Richard Jr.  MRN: [id number]  Admission Date: 4/19/2022  Hospital Length of Stay: 2 days  Code Status: Full Code  Attending Provider: Pema Cárdenas MD  Primary Care Provider: Kodi Tubbs MD   Principal Problem: Acute on chronic respiratory failure    Subjective:     HPI:  Mr. 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 Per wife he has had several days of increasing lethargy, increased work of breathing and secretions. Yesterday she became particularly concerned when he became slightly less responsive and interactive. Wife also notes increased yellow thick secretions from trach site. Upon EMS arrival he was on his home 5 L by trach collar and saturating 93% with significant wheezing.  He received 1 DuoNeb by them.  He was also suctioned.     Currently patient is alert, following commands.  He is responding to yes no questions.  He denies any chest pain but does endorse shortness of breath and cough.  He denies any abdominal pain or pain elsewhere      ED course:   He was given 500ccs IVF as well as one time doses of vanc/cefepime/azithro. Labs significant for BNP of 1600, WBC 5k, K 5.6, Bicarb 35, VBG 7.26/87/25/40. Trop wnl. CXR with new cardiomegaly and pulmonary edema. Suctioned with return of copious secretions. ICU was consulted for acute hypoxemic respiratory failure and he will be admitted for further management.      Hospital/ICU Course:  Admitted to ICU, started on CAP coverage and solumedrol. Quickly weaned off vent after return of copious secretions on suction. Has been maintaining sats well on home O2. Rad/onc consulted for completion of radiology while inpatient. Pending culture sensitivity.      Past Medical History:   Diagnosis Date     Cancer     skin to Rt forearm and face    COPD (chronic obstructive pulmonary disease)     Hyperlipidemia     Hypertension     Pseudoaneurysm        Past Surgical History:   Procedure Laterality Date    ABDOMINAL SURGERY      stents placed in liver and large intestines, per patient    CAROTID STENT      CORONARY STENT PLACEMENT  01/2000    DISSECTION OF NECK Bilateral 1/4/2022    Procedure: DISSECTION, NECK;  Surgeon: Naeem Smalls MD;  Location: [facility] OR [address] [place] [address];  Service: ENT;  Laterality: Bilateral;    ESOPHAGOGASTRODUODENOSCOPY W/ PEG N/A 1/4/2022    Procedure: EGD, WITH PEG TUBE INSERTION;  Surgeon: Anthony Calabrese MD;  [facility];  Service: General;  Laterality: N/A;    EYE SURGERY      Cataract bilateral    femoral stents      bilateral    FLAP PROCEDURE N/A 1/3/2022    Procedure: CREATION, FREE FLAP;  Surgeon: Naeem Smalls MD;  [facility];  Service: ENT;  Laterality: N/A;    FLAP PROCEDURE Right 1/4/2022    Procedure: CREATION, FREE FLAP;  Surgeon: Stacey Conde MD;  [facility];  Service: ENT;  Laterality: Right;  Ischemic start 1203  Ischemic stop 1353    GLOSSECTOMY N/A 1/4/2022    Procedure: GLOSSECTOMY;  Surgeon: Naeem Smalls MD;  Location: [facility] OR [address] [place] [address];  Service: ENT;  Laterality: N/A;    RADICAL NECK DISSECTION Left 1/3/2022    Procedure: DISSECTION, NECK, RADICAL;  Surgeon: Naeem Smalls MD;  [facility];  Service: ENT;  Laterality: Left;    SKIN CANCER EXCISION      TRACHEOTOMY N/A 1/4/2022    Procedure: TRACHEOTOMY;  Surgeon: Naeem Smalls MD;  Location: [facility] OR [address] [place] [address];  Service: ENT;  Laterality: N/A;       Review of patient's allergies indicates:  No Known Allergies    Family History    None       Tobacco Use    Smoking status: Former Smoker     Packs/day: 2.00     Years: 40.00     Pack years: 80.00     Types: Cigarettes     Start date: 4/17/1963     Quit date: 4/17/2018      Years since quittin.0    Smokeless tobacco: Never Used    Tobacco comment: 3/3 ppd x 40 yrs. Currently 3-4 cigarettes daily .He is trying  to quit. Is using a Vapor cigarettes  2-3 x's a day.   Substance and Sexual Activity    Alcohol use: Yes     Alcohol/week: 3.0 standard drinks     Types: 3 Cans of beer per week     Comment: beer daily 3-4    Drug use: No    Sexual activity: Not Currently      Review of Systems   Constitutional:  Positive for fatigue. Negative for chills and fever.   HENT:  Positive for voice change. Negative for congestion and sore throat.    Respiratory:  Positive for cough. Negative for choking, chest tightness and shortness of breath.    Cardiovascular:  Negative for chest pain, palpitations and leg swelling.   Gastrointestinal:  Negative for abdominal pain, constipation, diarrhea, nausea and vomiting.   Genitourinary:  Negative for dysuria, flank pain and hematuria.   Musculoskeletal:  Negative for back pain.   Skin:  Negative for color change and rash.   Allergic/Immunologic: Positive for immunocompromised state.   Neurological:  Negative for light-headedness and headaches.   Objective:     Vital Signs (Most Recent):  Temp: 98.8 °F (37.1 °C) (22 0701)  Pulse: 87 (22 0800)  Resp: 18 (22 0800)  BP: 136/69 (22 0800)  SpO2: 95 % (22 0800) Vital Signs (24h Range):  Temp:  [96.9 °F (36.1 °C)-99.2 °F (37.3 °C)] 98.8 °F (37.1 °C)  Pulse:  [] 87  Resp:  [18-61] 18  SpO2:  [93 %-99 %] 95 %  BP: ()/(63-95) 136/69   Weight: 61.9 kg (136 lb 7.4 oz)  Body mass index is 20.75 kg/m².      Intake/Output Summary (Last 24 hours) at 2022  Last data filed at 2022 0800  Gross per 24 hour   Intake 907.26 ml   Output 1450 ml   Net -542.74 ml         Physical Exam  Constitutional:       General: He is not in acute distress.     Appearance: He is ill-appearing. He is not toxic-appearing.   HENT:      Head: Normocephalic and atraumatic.      Nose: No  congestion.      Mouth/Throat:      Pharynx: No oropharyngeal exudate or posterior oropharyngeal erythema.   Eyes:      General: No scleral icterus.     Conjunctiva/sclera: Conjunctivae normal.   Cardiovascular:      Rate and Rhythm: Normal rate and regular rhythm.   Pulmonary:      Effort: No respiratory distress.      Breath sounds: Rhonchi present.      Comments: Sitting upright, normal respiratory rate, in no acute distress  Audible secretions  Abdominal:      General: There is no distension.      Tenderness: There is no abdominal tenderness. There is no guarding.      Comments: No suprapubic tenderness to palpation   Musculoskeletal:         General: No swelling.      Right lower leg: No edema.      Left lower leg: No edema.   Skin:     General: Skin is warm.      Coloration: Skin is not jaundiced.   Neurological:      General: No focal deficit present.      Mental Status: He is alert.      Comments: Answers yes/no to questions appropriately and follows commands     Vents:  Vent Mode: Spont (04/19/22 1458)  Pressure Support: 10 cmH20 (04/19/22 1458)  PEEP/CPAP: 5 cmH20 (04/19/22 1458)  Oxygen Concentration (%): 35 (04/21/22 0800)  Peak Airway Pressure: 16 cmH2O (04/19/22 1458)  Plateau Pressure: 0 cmH20 (04/19/22 1458)  Total Ve: 0.77 mL (04/19/22 1458)  F/VT Ratio<105 (RSBI): 324.32 (04/19/22 1458)  Lines/Drains/Airways       Drain  Duration                  Gastrostomy/Enterostomy 01/04/22 Percutaneous endoscopic gastrostomy (PEG)  days              Airway  Duration                  Surgical Airway 01/04/22 1546 Shiley Cuffed 106 days              Peripheral Intravenous Line  Duration                  Peripheral IV - Single Lumen 04/19/22 0920 20 G Right Antecubital 2 days         Peripheral IV - Single Lumen 04/19/22 1552 18 G Anterior;Distal;Left Forearm 1 day                  Significant Labs:    CBC/Anemia Profile:  Recent Labs   Lab 04/19/22  1442 04/20/22  0531 04/21/22  0333   WBC 3.67* 3.95  4.88   HGB 6.5* 9.2* 9.8*   HCT 21.5* 28.6* 29.7*    261 287   MCV 98 90 88   RDW 17.8* 16.9* 16.7*        Chemistries:  Recent Labs   Lab 04/19/22  0925 04/19/22  1839 04/20/22  0531 04/21/22  0333   * 132* 134* 135*   K 5.6* 4.8 4.0 3.4*   CL 86* 88* 87* 85*   CO2 35* 38* 33* 40*   BUN 16 14 13 24*   CREATININE 0.5 0.6 0.6 0.7   CALCIUM 9.4 8.5* 8.8 9.1   ALBUMIN 2.5*  --  2.5* 2.5*   PROT 6.1  --  5.4* 6.2   BILITOT 0.2  --  0.5 0.2   ALKPHOS 82  --  81 76   ALT 17  --  15 15   AST 16  --  20 16   MG  --   --  1.6 2.2   PHOS  --   --  3.8 4.4       All pertinent labs within the past 24 hours have been reviewed.    Significant Imaging: I have reviewed all pertinent imaging results/findings within the past 24 hours.      ABG  Recent Labs   Lab 04/19/22  1457 04/19/22  1808   PH 7.383 7.474*   PO2 26* 20*   PCO2 56.5* 56.7*   HCO3 33.6*  --    BE 9 18     Assessment/Plan:     Pulmonary  * Acute on chronic respiratory failure  Pt with SCC of the tongue s/p total glossectomy, chemowith cisplatin, and nearing completion of radiation with trach, COPD, asthma, HTN, CAD s/p PCI presenting for acute on chronic hypoxemic respiratory failure. He is on 5L with trach collar at home. He has had increased yellow secretions, work of breathing and SOB for the past several days. BNP elevated to 1600 on admission with pulmonary edema and new cardiomegaly on CXR. Trop wnl. Bicarb is elevated suggesting chronic hypercapnia. Suctioning has cleared thick mucous. He was able to be weaned down to his home O2, however after ~ 20 minutes he desats again, requiring deep suction with resolution of hypoxia. Now stable on home O2. Diuresed -3L      -Pt stable on home O2 for 24 hours, ready to stepdown to floor  - CAP coverage with ceftriaxone/azithro  - Methylprednisolone 40 mg x5 days for possible element of COPD exacerbation  - Duonebs q4  - Hypertonic saline nebs  - Chest PT  - Suction PRN    Cardiac/Vascular  Other  hyperlipidemia  -Continue home statin    Primary hypertension  Holding home anti-hypertensives, will resume later    Coronary artery disease involving native coronary artery of native heart without angina pectoris  Continue ASA, plavix, and statin    Oncology  Squamous cell cancer of tongue  12/15/21 FNA left neck mass : squamous cell carcinoma, p16 negative  1/4/22 total glossectomy, bilateral neck dissection, bilateral cervical facial advancement flaps and anterolateral thigh free flap reconstruction of his glossectomy defect.  Final path :  [id number] (+microscopic SALINAS, single contralateral node), superior soft tissue margin of left neck with tumor.  2/28/22 start chemoradiation  Finished chemo with cisplatin 4/6. Was going to complete final dose of radiation tomorrow.    -Rad/onc consulted for radiation while inpatient           Critical Care Daily Checklist:    A: Awake: RASS Goal/Actual Goal: RASS Goal: 0-->alert and calm  Actual: Johnson Agitation Sedation Scale (RASS): Alert and calm   B: Spontaneous Breathing Trial Performed?     C: SAT & SBT Coordinated?  NA                      D: Delirium: CAM-ICU Overall CAM-ICU: Negative   E: Early Mobility Performed? Yes   F: Feeding Goal: Goals: Meet % EEN, EPN by RD f/u date  Status: Nutrition Goal Status: new   Current Diet Order   No orders of the defined types were placed in this encounter.      AS: Analgesia/Sedation NA   T: Thromboembolic Prophylaxis Lovenox   H: HOB > 300 Yes   U: Stress Ulcer Prophylaxis (if needed) No   G: Glucose Control Yes   B: Bowel Function Stool Occurrence: 0   I: Indwelling Catheter (Lines & Lainez) Necessity Keep   D: De-escalation of Antimicrobials/Pharmacotherapies No    Plan for the day/ETD Stepdown, rad/onc    Code Status:  Family/Goals of Care: Full Code         Critical secondary to Patient has a condition that poses threat to life and bodily function: Initially acute on chronic hypoxemic respiratory failure, however pt now  ready for stepdown      Critical care was time spent personally by me on the following activities: development of treatment plan with patient or surrogate and bedside caregivers, discussions with consultants, evaluation of patient's response to treatment, examination of patient, ordering and performing treatments and interventions, ordering and review of laboratory studies, ordering and review of radiographic studies, pulse oximetry, re-evaluation of patient's condition. This critical care time did not overlap with that of any other provider or involve time for any procedures.     Jonathan Leroy MD  Critical Care Medicine  [facility] - Cardiac Medical [facility]

## 2022-04-21 NOTE — PROGRESS NOTES
Cardiac monitoring D/C'd in [facility] by MD for CCS2. Pt to be transferred without cardiac monitoring by transport team to radiation oncology for treatment.

## 2022-04-21 NOTE — HPI
Mr. 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 Per wife he has had several days of increasing lethargy, increased work of breathing and secretions. Yesterday she became particularly concerned when he became slightly less responsive and interactive. Wife also notes increased yellow thick secretions from trach site. Upon EMS arrival he was on his home 5 L by trach collar and saturating 93% with significant wheezing.  He received 1 DuoNeb by them.  He was also suctioned.     Currently patient is alert, following commands.  He is responding to yes no questions.  He denies any chest pain but does endorse shortness of breath and cough.  He denies any abdominal pain or pain elsewhere       ED course:   He was given 500ccs IVF as well as one time doses of vanc/cefepime/azithro. Labs significant for BNP of 1600, WBC 5k, K 5.6, Bicarb 35, VBG 7.26/87/25/40. Trop wnl. CXR with new cardiomegaly and pulmonary edema. Suctioned with return of copious secretions. ICU was consulted for acute hypoxemic respiratory failure and he will be admitted for further management.    Interval History:     Admitted to ICU, started on CAP coverage and solumedrol. Quickly weaned off vent after return of copious secretions on suction. Has been maintaining sats well on home O2. Rad/onc consulted for completion of radiology while inpatient. Pending culture sensitivity.    4/21 Transfer to hospital medicine . 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  initially requiring vent but has now been stable on home trach collar for 24 hours. sats 93% on 8L via trach collar.  Awaiting cultures from sputum, on CAP coverage with ceftriaxone. completed Azithromycin. continue  solumedrol 40mg IV daily.  rad/onc consulted this morning-he was supposed to complete radiation outpt but was admitted, attempting to set up for inpatient  4/22 changed to bolus GT feedings. s/p radiation oncology eval -on adjuvant chemoradiation, completed 31/33 fractions of RT.  renitiation of RT  inpatient when patient able to tolerate. sats 95% on trach collar 10/ fio2 60% . completed azithromycin.  started on Zosyn for possible aspiration  repeat CX ray-in the inferior hemithorax on the left side consistent with airspace consolidation/volume loss in the left lower lobe is again appreciated, but the lung zones are essentially stable since the prior exam and demonstrate no new areas of airspace consolidation or volume loss. respiratory culture 4/19 with pansensitive  pseudomonas.     4/23 intermittently refusing bolus GT feedings.stable on 10L/60% Fio2 . completed radiation sessions. off solumedrol  today. PT recs SNF  4/24 K and P replaced   4/25 sodium 150. started on D5W . repeat renal panel in PM . oxygen tapered to 8L/ Fio2 35%   4/26 K of 3.2  replaced   4/27 stable on 8L/ Fio2 35% . mucous plug remvove by suction , wants glycopyrrolate PRN due to dry mouth   Interval History:   4/28 - has trach, G tube, NPO, completed radiation treatments, 92% on 8 liters,+ stool and urinations. Weaning oxygen. Wants to go home w HH. Discussed his care and condition with his wife.    4/29 - still requiring 8 liters per NC. Sats low 90s. I lowered oxygen to 5 liters while in the room and he tolerated it.  Keep weaning.   4/30 - back up to 8 liters per NC, refused some Bolus TFs yesterday  5/1 - not able to wean oxygen.  He may need a LTAC for HF oxygen. Otherwise, progressing,. Sat up in chair yesterday, + urinations and BM.   5/2 - on 8 liters per NC this am. 92%.  His home goal is 5 liters. Pt tends to need more at night.  Plan is to keep him overnight on 5 liters to make sure he is safe for home oxygen.  He may benefit  from LTAC a few days. Accepted to LTAC, waiting on ins auth.  Needs PT/OT for deconditioning.  5/3 -  8 liters, up in chair a am, monitor % meals, on nebs q 4 hours. PT/OT, trach care and suctioning. LTAC referrals sent.   5/4 - nurses unable to keep flow rate below 8 overnight, despite coaching. His volume status is stable. Repeat cxr. - Interval development of increased opacification within the right lung base, may reflect developing infiltrate versus atelectasis.  Small bilateral pleural effusions, increased on the left as compared to prior.  5/5  past week not able to wean oxygen.   sats 93 % on 8L NC/ fio2 35%. tapered to 5L/28% needs LTAC for HF oxygen.  repeated a cxray 5/4 - Interval development of increased opacification within the right lung base, may reflect developing infiltrate versus atelectasis. started antibiotics- vanc and zosyn.  Consider pulmonary eval .  just completed  course of zosyn 4/29 5/6 tolerated 6L NC/ fio2 35% overnight . peer to peer with insurane today. EKG with sinus tachycardia   discussed [facility] pulmonology. monitor off antibiotics zosyn and vancomcyn as he completed adequate course of antibiotics for pseudomas and aspiration. CX ray improvement may lag behind treatment  5/7 stable on 5L/ fio2 28% overnight .completed peer to peer with insurance today . awaiting decision   5/9  stable on 5L/ fio2 28%. denied LTAC,. discussed with wife at the bedside. she wants PT/OT and SLP eval - to work with patient consistently . prefers rehab as second option  5/10 able to tolerate PMSV but aspiration with swallow trials. NPO for  now. K of 5.2 repeat renal panel WNL   PT/OT to work with patient on regular basis. ambulated with PT today. PT recs rehab   5/11 awaiting  [place] rehab acceptance

## 2022-04-21 NOTE — ASSESSMENT & PLAN NOTE
Chronic, controlled.  Latest blood pressure and vitals reviewed-   Temp:  [96.9 °F (36.1 °C)-99.2 °F (37.3 °C)]   Pulse:  []   Resp:  [18-61]   BP: ()/(62-95)   SpO2:  [91 %-99 %] .   Home meds for hypertension were reviewed- amlodipine, losartan and metoprolol  held for now  PRN meds if BP> 180/110 mm HG

## 2022-04-21 NOTE — ASSESSMENT & PLAN NOTE
Nutrition consulted. Most recent weight and BMI monitored-          Malnutrition (Moderate to Severe)  Weight Loss (Malnutrition): greater than 10% in 6 months                 Measurements:  Wt Readings from Last 1 Encounters:   04/20/22 61.9 kg (136 lb 7.4 oz)   Body mass index is 20.75 kg/m².    Recommendations: Recommendation/Intervention: 1.  Goals: Meet % EEN, EPN by RD f/u date    Patient has been screened and assessed by RD. RD will follow patient.    4/21 secondary to above. on G tube feedings   4/22 changed to bolus GT feedings.

## 2022-04-21 NOTE — PROGRESS NOTES
[facility] - Cardiac Medical ICU  [facility] Medicine  Progress Note    Patient Name: Fareed Richard Jr.  MRN: [id number]  Patient Class: IP- Inpatient   Admission Date: 4/19/2022  Length of Stay: 3 days  Attending Physician: Jordan Armenta MD  Primary Care Provider: Kodi Tubbs MD        Subjective:     Principal Problem:Acute on chronic respiratory failure        HPI:  Mr. 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 Per wife he has had several days of increasing lethargy, increased work of breathing and secretions. Yesterday she became particularly concerned when he became slightly less responsive and interactive. Wife also notes increased yellow thick secretions from trach site. Upon EMS arrival he was on his home 5 L by trach collar and saturating 93% with significant wheezing.  He received 1 DuoNeb by them.  He was also suctioned.     Currently patient is alert, following commands.  He is responding to yes no questions.  He denies any chest pain but does endorse shortness of breath and cough.  He denies any abdominal pain or pain elsewhere       ED course:   He was given 500ccs IVF as well as one time doses of vanc/cefepime/azithro. Labs significant for BNP of 1600, WBC 5k, K 5.6, Bicarb 35, VBG 7.26/87/25/40. Trop wnl. CXR with new cardiomegaly and pulmonary edema. Suctioned with return of copious secretions. ICU was consulted for acute hypoxemic respiratory failure and he will be admitted for further management.              Overview/Hospital Course:    Admitted to ICU, started on CAP coverage and solumedrol. Quickly weaned off vent after return of copious secretions on suction. Has been maintaining sats well on home O2. Rad/onc consulted for completion of radiology while inpatient. Pending culture sensitivity.    4/21 Transfer to hospital  medicine . 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  initially requiring vent but has now been stable on home trach collar for 24 hours. sats 93% on 8L via trach collar.  Awaiting cultures from sputum, on CAP coverage with ceftriaxone. completed Azithromycin. continue solumedrol 40mg IV daily.  rad/onc consulted this morning-he was supposed to complete radiation outpt but was admitted, attempting to set up for inpatient  4/22 changed to bolus GT feedings. s/p radiation oncology eval -on adjuvant chemoradiation, completed 31/33 fractions of RT.  renitiation of RT  inpatient when patient able to tolerate. sats 95% on trach collar 10/ fio2 60% . completed azithromycin.  started on Zosyn for possible aspiration  repeat CX ray-in the inferior hemithorax on the left side consistent with airspace consolidation/volume loss in the left lower lobe is again appreciated, but the lung zones are essentially stable since the prior exam and demonstrate no new areas of airspace consolidation or volume loss. respiratory culture 4/19 with pseudomonas.              Review of Systems:   Pain scale:   Constitutional:  fever,  chills, headache, vision loss, hearing loss, weight loss, Generalized weakness, falls, loss of smell, loss of taste, poor appetite,  sore throat, voice change,immunocompromised state.   Respiratory: cough, shortness of breath.   Cardiovascular: chest pain, dizziness, palpitations, orthopnea, swelling of feet, syncope  Gastrointestinal: nausea, vomiting, abdominal pain, diarrhea, black stool,  blood in stool, change in bowel habits  Genitourinary: hematuria, dysuria, urgency, frequency  Integument/Breast: rash,  pruritis  Hematologic/Lymphatic: easy bruising, lymphadenopathy  Musculoskeletal: arthralgias , myalgias, back pain, neck pain, knee pain  Neurological: confusion, seizures, tremors,  slurred speech  Behavioral/Psych:  depression, anxiety, auditory or visual hallucinations     OBJECTIVE:     Physical Exam:  Body mass index is 20.75 kg/m².    Constitutional: Appears well-developed and well-nourished. thin built   Head: Normocephalic and atraumatic.   Neck: Normal range of motion. Neck supple. trach collar  Cardiovascular: Normal heart rate.  Regular heart rhythm.  Pulmonary/Chest: Effort normal.   Abdominal: No distension.  No tenderness, PEG tube insitu   Musculoskeletal: Normal range of motion. No edema.   Neurological: Alert and oriented to person, place, and time.   Skin: Skin is warm and dry.   Psychiatric: Normal mood and affect. Behavior is normal.                  Vital Signs  Temp: 98.1 °F (36.7 °C) (04/22/22 1132)  Pulse: 97 (04/22/22 1635)  Resp: 16 (04/22/22 1635)  BP: 138/70 (04/22/22 1132)  SpO2: (Abnormal) 90 % (04/22/22 1500)     24 Hour VS Range    Temp:  [98.1 °F (36.7 °C)-98.8 °F (37.1 °C)]   Pulse:  []   Resp:  [14-39]   BP: (102-168)/(66-76)   SpO2:  [85 %-97 %]     Intake/Output Summary (Last 24 hours) at 4/22/2022 1635  Last data filed at 4/22/2022 0944  Gross per 24 hour   Intake 0 ml   Output 650 ml   Net -650 ml         I/O This Shift:  No intake/output data recorded.    Wt Readings from Last 3 Encounters:   04/20/22 61.9 kg (136 lb 7.4 oz)   04/06/22 63 kg (138 lb 14.2 oz)   04/04/22 63 kg (139 lb)       I have personally reviewed the vitals and recorded Intake/Output     Laboratory/Diagnostic Data:    CBC/Anemia Labs: Coags:    Recent Labs   Lab 04/20/22  0531 04/21/22  0333 04/22/22  0244   WBC 3.95 4.88 8.02   HGB 9.2* 9.8* 10.3*   HCT 28.6* 29.7* 32.8*    287 317   MCV 90 88 94   RDW 16.9* 16.7* 17.1*    Recent Labs   Lab 04/19/22  0929   APTT <21.0        Chemistries: ABG:   Recent Labs   Lab 04/20/22  0531 04/21/22  0333 04/22/22  0244   * 135* 138   K 4.0 3.4* 3.5   CL 87* 85* 90*   CO2 33* 40* 41*   BUN 13 24* 42*   CREATININE 0.6 0.7 0.7    CALCIUM 8.8 9.1 9.3   PROT 5.4* 6.2 6.2   BILITOT 0.5 0.2 0.3   ALKPHOS 81 76 82   ALT 15 15 21   AST 20 16 22   MG 1.6 2.2 2.1   PHOS 3.8 4.4 2.9    Recent Labs   Lab 04/19/22  0933 04/19/22  1107 04/19/22  1457 04/19/22  1808   PH 7.232* 7.263* 7.383 7.474*   PCO2 101.5* 87.3* 56.5* 56.7*   PO2 16* 25* 26* 20*   HCO3 42.7* 39.5* 33.6*  --    POCSATURATED 14* 34* 46* 34*   BE 15 12 9 18        POCT Glucose: HbA1c:    No results for input(s): POCTGLUCOSE in the last 168 hours. No results found for: HGBA1C     Cardiac Enzymes: Ejection Fractions:    No results for input(s): CPK, CPKMB, MB, TROPONINI in the last 72 hours. EF   Date Value Ref Range Status   04/19/2022 60 % Final   12/30/2021 65 % Final          No results for input(s): COLORU, APPEARANCEUA, PHUR, SPECGRAV, PROTEINUA, GLUCUA, KETONESU, BILIRUBINUA, OCCULTUA, NITRITE, UROBILINOGEN, LEUKOCYTESUR, RBCUA, WBCUA, BACTERIA, SQUAMEPITHEL, HYALINECASTS in the last 48 hours.    Invalid input(s): WRIGHTSUR    Procalcitonin (ng/mL)   Date Value   04/19/2022 0.09     Lactate (Lactic Acid) (mmol/L)   Date Value   04/19/2022 1.0     BNP (pg/mL)   Date Value   04/19/2022 1,607 (H)   03/21/2022 242 (H)   11/02/2019 260 (H)   12/27/2018 239 (H)     No results found for: CRP, SEDRATE  No results found for: DDIMER  Ferritin (ng/mL)   Date Value   02/16/2022 135     No results found for: LDH  Troponin I (ng/mL)   Date Value   04/19/2022 0.018   11/02/2019 0.016   12/27/2018 <0.006     No results found for this or any previous visit.  SARS-CoV2 (COVID-19) Qualitative PCR (no units)   Date Value   12/31/2021 Not Detected     SARS-CoV-2 RNA, Amplification, Qual (no units)   Date Value   01/13/2022 Negative   01/03/2022 Negative     POC Rapid COVID (no units)   Date Value   04/19/2022 Negative       Microbiology labs for the last week  Microbiology Results (last 7 days)     Procedure Component Value Units Date/Time    Blood culture x two cultures. Draw prior to antibiotics.  [[id number]] Collected: 04/19/22 0930    Order Status: Completed Specimen: Blood from Peripheral, Antecubital, Left Updated: 04/22/22 1012     Blood Culture, Routine No Growth to date      No Growth to date      No Growth to date      No Growth to date    Narrative:      Aerobic and anaerobic    Blood culture x two cultures. Draw prior to antibiotics. [[id number]] Collected: 04/19/22 0925    Order Status: Completed Specimen: Blood from Peripheral, Antecubital, Right Updated: 04/22/22 1012     Blood Culture, Routine No Growth to date      No Growth to date      No Growth to date      No Growth to date    Narrative:      Aerobic and anaerobic    Culture, Respiratory [[id number]]  (Abnormal)  (Susceptibility) Collected: 04/19/22 1119    Order Status: Completed Specimen: Respiratory from Sputum Updated: 04/22/22 0943     Respiratory Culture PSEUDOMONAS AERUGINOSA  Rare  Normal respiratory ishmael also present       Gram Stain (Respiratory) <10 epithelial cells per low power field.     Gram Stain (Respiratory) Rare WBC's     Gram Stain (Respiratory) Rare Gram positive cocci     Gram Stain (Respiratory) Rare Gram positive rods    Respiratory Infection Panel (PCR), Nasopharyngeal [[id number]] Collected: 04/19/22 1434    Order Status: Canceled Specimen: Nasopharyngeal Swab     Influenza A & B by Molecular [[id number]]     Order Status: Canceled Specimen: Nasopharyngeal Swab           Reviewed and noted in plan where applicable- Please see chart for full lab data.    Lines/Drains:       Peripheral IV - Single Lumen 04/19/22 0920 20 G Right Antecubital (Active)   Site Assessment Clean;Dry;Intact;No redness;No swelling;No drainage;No warmth 04/21/22 0701   Extremity Assessment Distal to IV No abnormal discoloration;No redness;No swelling;No warmth 04/21/22 0701   Line Status Infusing 04/21/22 0701   Dressing Status Clean;Dry;Intact 04/21/22 0701   Dressing Intervention Integrity maintained 04/21/22 0701   Dressing Change Due 04/23/22  04/21/22 0701   Site Change Due 04/23/22 04/21/22 0701   Reason Not Rotated Not due 04/21/22 0701   Number of days: 2            Peripheral IV - Single Lumen 04/19/22 1552 18 G Anterior;Distal;Left Forearm (Active)   Site Assessment Dry;Clean;Intact;No redness;No swelling;No warmth;No drainage 04/21/22 0701   Extremity Assessment Distal to IV No abnormal discoloration;No redness;No swelling;No warmth 04/21/22 0701   Line Status Flushed;Saline locked 04/21/22 0701   Dressing Status Clean;Dry;Intact 04/21/22 0701   Dressing Intervention Integrity maintained 04/21/22 0701   Dressing Change Due 04/23/22 04/21/22 0701   Site Change Due 04/23/22 04/21/22 0701   Reason Not Rotated Not due 04/21/22 0701   Number of days: 1            Gastrostomy/Enterostomy 01/04/22 Percutaneous endoscopic gastrostomy (PEG) LUQ (Active)   Securement secured to abdomen 04/21/22 0701   Interventions Prior to Feeding patency checked;residual checked 04/21/22 0701   Feeding Type intermittent;bolus 04/21/22 0701   Clamp Status/Tolerance clamped;no abdominal distention;no abdominal discomfort;no emesis;no nausea;no residual;no restlessness 04/21/22 0701   Feeding Action feeding continued 04/21/22 0305   Dressing dry and intact 04/21/22 0701   Insertion Site no redness;no warmth;no drainage;no tenderness;no swelling 04/21/22 0701   Site Care secured w/ tape 04/21/22 0305   Flush/Irrigation flushed w/;water;no resistance met 04/21/22 0701   Dressing Change Due 04/21/22 04/21/22 0701   Intake (mL) 120 mL 04/21/22 0701   Formula Name Impact Peptide 1.5 04/21/22 0305   Tube Feeding Intake (mL) 500 04/21/22 0701   Residual Amount (ml) 0 ml 04/21/22 0701   Number of days: 107       Imaging  ECG Results          EKG 12-lead (Final result)  Result time 04/19/22 17:42:02    Final result by Interface, [facility] (04/19/22 17:42:02)             Narrative:    Test Reason : R06.02,    Vent. Rate : 081 BPM     Atrial Rate : 081 BPM     P-R Int : 158 ms           QRS Dur : 092 ms      QT Int : 366 ms       P-R-T Axes : 061 -70 045 degrees     QTc Int : 425 ms    Normal sinus rhythm  Left axis deviation Now present  Incomplete right bundle branch block  Abnormal ECG  When compared with ECG of 10-MADONNA-2022 10:11,    Confirmed by DIONNE SINGH MD ([id number]) on 4/19/2022 5:41:52 PM    Referred By: [email or web address]   SELF           Confirmed By:DIONNE SINGH MD                            Results for orders placed during the hospital encounter of 04/19/22    Echo    Interpretation Summary  · The left ventricle is normal in size with normal systolic function. The estimated ejection fraction is 60%.  · Normal right ventricular size with normal right ventricular systolic function.  · Normal left ventricular diastolic function.  · The aortic root is mildly dilated.  · Mechanically ventilated; cannot use inferior caval vein diameter to estimate central venous pressure. The IVC is not enlarged and does collapse somewhat with the respiratory cycle, essentially ruling out venous hypertension.      X-Ray Chest AP Portable  Narrative: EXAMINATION:  XR CHEST AP PORTABLE    CLINICAL HISTORY:  hypoxia;    COMPARISON:  Comparison is made to 04/21/2022 at 17:08.    FINDINGS:  Tracheostomy cannula is again noted.  Heart size is within normal limits, with no detrimental change in the appearance of the cardiomediastinal silhouette or pulmonary vascularity since the prior exam referenced above.  Some opacity in the inferior hemithorax on the left side consistent with airspace consolidation/volume loss in the left lower lobe is again appreciated, but the lung zones are essentially stable since the prior exam and demonstrate no new areas of airspace consolidation or volume loss.  No pleural fluid of any substantial volume is seen on either side.  No pneumothorax.  Surgical clips in the soft tissues of the neck to both sides of midline and a tiny focus of soft tissue calcification adjacent to the left  greater tuberosity are incidental observations, as is some calcification in the wall of the aortic arch.  Impression: No significant detrimental interval change in the appearance of the chest since 04/21/2022 at 17:08 is appreciated.    Electronically signed by: Kodi Schuster MD  Date:    04/22/2022  Time:    10:30  X-Ray Chest AP Portable  Narrative: EXAMINATION:  XR CHEST AP PORTABLE    CLINICAL HISTORY:  concern for aspiration;    TECHNIQUE:  Single frontal view of the chest was performed.    COMPARISON:  Chest radiograph 04/19/2022    FINDINGS:  Endotracheal tube with tip in stable position.  Cardiac leads overlie the field of view.    Heart is mildly enlarged in size.  Mediastinal borders are unremarkable.  Atherosclerosis of the visualized aorta.    Right pleural margin is obscured by overlying cardiac leads, with likely mild bilateral pleural effusions, similar to prior imaging.  No pneumothorax.    Improvement in interstitial and airspace opacities when compared to radiograph 04/19/2022 with continued mild prominent hilar pulmonary vasculature and interstitial prominence throughout both lungs, likely representing improvement in pulmonary edema.    Osseous structures demonstrate no evidence for fracture or osseous destructive lesion.    Soft tissues are unremarkable.  Impression: Interval improvement in pulmonary edema when compared to radiograph 04/19/2022.  Mild residual interstitial edema on today's imaging.    Cardiomegaly and small bilateral pleural effusions.    Electronically signed by resident: Cam Rosario  Date:    04/22/2022  Time:    06:15    Electronically signed by: Patricia Stephens  Date:    04/22/2022  Time:    08:05      Labs, Imaging, EKG and Diagnostic results from 4/22/2022 were reviewed.    Medications:  Medication list was reviewed and changes noted under Assessment/Plan.  No current facility-administered medications on file prior to encounter.     Current Outpatient Medications on File Prior to  Encounter   Medication Sig Dispense Refill    amLODIPine (NORVASC) 10 MG tablet Take 10 mg by mouth once daily.      atorvastatin (LIPITOR) 20 MG tablet 1 tablet (20 mg total) by Per G Tube route once daily. 90 tablet 3    glycopyrrolate (CUVPOSA) 1 mg/5 mL (0.2 mg/mL) Soln Take 5 mLs (1 mg total) by mouth 3 (three) times daily as needed (TO REDUCE SECRETIONS). 473 mL 1    acetaminophen (TYLENOL) 325 MG tablet 2 tablets (650 mg total) by Per G Tube route every 6 (six) hours.  0    albuterol-ipratropium (DUO-NEB) 2.5 mg-0.5 mg/3 mL nebulizer solution Take 3 mLs by nebulization every 4 (four) hours as needed for Wheezing. Rescue 75 mL 0    aspirin 81 MG Chew 1 tablet (81 mg total) by Per G Tube route once daily.  0    bisacodyL (DULCOLAX) 10 mg Supp Place 1 suppository (10 mg total) rectally daily as needed.  0    clopidogreL (PLAVIX) 75 mg tablet 1 tablet (75 mg total) by Per G Tube route once daily. 30 tablet 11    duke's soln (benadryl 30 mL, mylanta 30 mL, LIDOcaine 30 mL, nystatin 30 mL) 120mL Take 10 mLs by mouth 4 (four) times daily as needed (mucositis). 360 mL 0    enoxaparin (LOVENOX) 40 mg/0.4 mL Syrg Inject 0.4 mLs (40 mg total) into the skin once daily.      folic acid (FOLVITE) 1 MG tablet 1 tablet (1 mg total) by Per G Tube route once daily.  0    guaiFENesin 200 mg/5 mL Liqd Take 400 mg by mouth every 4 (four) hours as needed (for secretions). 473 mL 3    losartan (COZAAR) 50 MG tablet 1 tablet (50 mg total) by Per G Tube route once daily. 90 tablet 3    melatonin (MELATIN) 3 mg tablet 2 tablets (6 mg total) by Per G Tube route nightly.  0    metoprolol tartrate (LOPRESSOR) 100 MG tablet 1 tablet (100 mg total) by Per G Tube route 2 (two) times daily. 60 tablet 11    ondansetron (ZOFRAN-ODT) 8 MG TbDL Take 1 tablet (8 mg total) by mouth every 8 (eight) hours as needed (nausea). 30 tablet 1    oxyCODONE (ROXICODONE) 5 mg/5 mL Soln 5 mLs (5 mg total) by Per G Tube route every 4 (four)  hours as needed (Pain). 150 mL 0    polyethylene glycol (GLYCOLAX) 17 gram PwPk 17 g by Per G Tube route 2 (two) times daily.  0    senna-docusate 8.6-50 mg (PERICOLACE) 8.6-50 mg per tablet 1 tablet by Per G Tube route 2 (two) times daily.      sodium chloride (OCEAN) 0.65 % nasal spray 1 spray by Nasal route as needed for Congestion.  12    sodium chloride 3% 3 % nebulizer solution Take 4 mLs by nebulization every 8 (eight) hours.      thiamine 100 MG tablet 1 tablet (100 mg total) by Per G Tube route once daily.       Scheduled Medications:  albuterol-ipratropium, 3 mL, Nebulization, Q4H  aspirin, 81 mg, Per G Tube, Daily  atorvastatin, 20 mg, Per G Tube, Daily  clopidogreL, 75 mg, Per G Tube, Daily  enoxaparin, 40 mg, Subcutaneous, Daily  folic acid, 1 mg, Per G Tube, Daily  glycopyrrolate, 1 mg, Per G Tube, TID  melatonin, 6 mg, Per G Tube, Nightly  methylPREDNISolone sodium succinate injection, 40 mg, Intravenous, Daily  piperacillin-tazobactam (ZOSYN) IVPB, 4.5 g, Intravenous, Q8H  polyethylene glycol, 17 g, Per G Tube, BID  potassium bicarbonate, 25 mEq, Per G Tube, Once  sodium chloride 3%, 4 mL, Nebulization, Q8H  thiamine, 100 mg, Per G Tube, Daily      PRN: sodium chloride, albuterol-ipratropium, bisacodyL, duke's soln (benadryl 30 mL, mylanta 30 mL, LIDOcaine 30 mL, nystatin 30 mL) 120 mL, ondansetron, oxyCODONE, sodium chloride, sodium chloride 0.9%  Infusions:   Estimated Creatinine Clearance: 82.3 mL/min (based on SCr of 0.7 mg/dL).    Assessment/Plan:      * Acute on chronic respiratory failure    Pt with SCC of the tongue s/p total glossectomy, chemowith cisplatin, and nearing completion of radiation with trach, COPD, asthma, HTN, CAD s/p PCI presenting for acute on chronic hypoxemic respiratory failure. He is on 5L with trach collar at home. He has had increased yellow secretions, work of breathing and SOB for the past several days. BNP elevated to 1600 on admission with pulmonary edema and  new cardiomegaly on CXR. Trop wnl. Bicarb is elevated suggesting chronic hypercapnia. Suctioning has cleared thick mucous. He was able to be weaned down to his home O2, however after ~ 20 minutes he desats again, requiring deep suction with resolution of hypoxia. Now stable on home O2. Diuresed -3L        -Pt stable on home O2 for 24 hours, ready to stepdown to floor  - CAP coverage with ceftriaxone/azithro  - Methylprednisolone 40 mg x5 days for possible element of COPD exacerbation  - Duonebs q4  - Hypertonic saline nebs  - Chest PT  - Suction PRN    4/21 Transfer to [facility] medicine . 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  initially requiring vent but has now been stable on home trach collar for 24 hours. sats 93% on 8L via trach collar.  Awaiting cultures from sputum, on CAP coverage with ceftriaxone. completed Azithromycin. continue solumedrol 40mg IV daily.   4/22 sats 95% on trach collar 10/ fio2 60% . completed azithromycin.   started on Zosyn for possible aspiration  repeat CX ray-in the inferior hemithorax on the left side consistent with airspace consolidation/volume loss in the left lower lobe is again appreciated, but the lung zones are essentially stable since the prior exam and demonstrate no new areas of airspace consolidation or volume loss.  respiratory culture 4/19 with pseudomonas. BNP    Aspiration pneumonia    4/22 sats 95% on trach collar 10/ fio2 60% . completed azithromycin.   started on Zosyn for possible aspiration  repeat CX ray-in the inferior hemithorax on the left side consistent with airspace consolidation/volume loss in the left lower lobe is again appreciated, but the lung zones are essentially stable since the prior exam and demonstrate no new areas of airspace consolidation or volume loss.  respiratory culture 4/19 with pseudomonas.            Dysphagia  Nutrition consulted. Most recent weight and BMI monitored-          Malnutrition (Moderate to Severe)  Weight Loss (Malnutrition): greater than 10% in 6 months                 Measurements:  Wt Readings from Last 1 Encounters:   04/20/22 61.9 kg (136 lb 7.4 oz)   Body mass index is 20.75 kg/m².    Recommendations: Recommendation/Intervention: 1.  Goals: Meet % EEN, EPN by RD f/u date    Patient has been screened and assessed by RD. RD will follow patient.    4/21 secondary to above. on G tube feedings   4/22 changed to bolus GT feedings.       Hypokalemia  replaced       Chronic respiratory failure with hypoxia, on home O2 therapy  secondary to COPD. on 5L oxygen  at home    Protein-calorie malnutrition  Nutrition consulted. Most recent weight and BMI monitored-          Malnutrition (Moderate to Severe)  Weight Loss (Malnutrition): greater than 10% in 6 months              Measurements:  Wt Readings from Last 1 Encounters:   04/20/22 61.9 kg (136 lb 7.4 oz)   Body mass index is 20.75 kg/m².    Recommendations: Recommendation/Intervention: 1.  Goals: Meet % EEN, EPN by RD f/u date    Patient has been screened and assessed by RD. RD will follow patient.      Normocytic anemia    Patient's with Normocytic anemia.. Hemoglobin stable. Etiology likely due to chronic disease .  Current CBC reviewed-  Recent Labs   Lab 04/19/22  1442 04/20/22  0531 04/21/22  0333   HGB 6.5* 9.2* 9.8*     Monitor CBC and transfuse if H/H drops below 7/21.       Other hyperlipidemia  Continue home statin         Primary hypertension    Chronic, controlled.  Latest blood pressure and vitals reviewed-   Temp:  [96.9 °F (36.1 °C)-99.2 °F (37.3 °C)]   Pulse:  []   Resp:  [18-61]   BP: ()/(62-95)   SpO2:  [91 %-99 %] .   Home meds for hypertension were reviewed- amlodipine, losartan and metoprolol  held for now  PRN meds if BP> 180/110 mm HG      Coronary artery disease involving native coronary artery  of native heart without angina pectoris    Continue ASA, plavix, and statin       Squamous cell cancer of tongue    12/15/21 FNA left neck mass : squamous cell carcinoma, p16 negative  1/4/22 total glossectomy, bilateral neck dissection, bilateral cervical facial advancement flaps and anterolateral thigh free flap reconstruction of his glossectomy defect.  Final path :  [id number] (+microscopic SALINAS, single contralateral node), superior soft tissue margin of left neck with tumor.  2/28/22 start chemoradiation  Finished chemo with cisplatin 4/6. Was going to complete final dose of radiation tomorrow.     -Rad/onc consulted for radiation while inpatient  4/22  s/p radiation oncology eval -on adjuvant chemoradiation, completed 31/33 fractions of RT.  renitiation of RT  inpatient when patient able to tolerate.         VTE Risk Mitigation (From admission, onward)         Ordered     enoxaparin injection 40 mg  Daily         04/19/22 1123     IP VTE HIGH RISK PATIENT  Once         04/19/22 1123     Place sequential compression device  Until discontinued         04/19/22 1123                Discharge Planning   NISA: 4/26/2022     Code Status: Full Code   Is the patient medically ready for discharge?: No    Reason for patient still in hospital (select all that apply): Treatment  Discharge Plan A: Home Health                  Jordan Armenta MD  Department of Hospital Medicine   [facility] - Cardiac Medical ICU

## 2022-04-21 NOTE — ASSESSMENT & PLAN NOTE
12/15/21 FNA left neck mass : squamous cell carcinoma, p16 negative  1/4/22 total glossectomy, bilateral neck dissection, bilateral cervical facial advancement flaps and anterolateral thigh free flap reconstruction of his glossectomy defect.  Final path :  [id number] (+microscopic SALINAS, single contralateral node), superior soft tissue margin of left neck with tumor.  2/28/22 start chemoradiation  Finished chemo with cisplatin 4/6. Was going to complete final dose of radiation tomorrow.    -Rad/onc consulted for radiation while inpatient

## 2022-04-21 NOTE — SUBJECTIVE & OBJECTIVE
Past Medical History:   Diagnosis Date    Cancer     skin to Rt forearm and face    COPD (chronic obstructive pulmonary disease)     Hyperlipidemia     Hypertension     Pseudoaneurysm        Past Surgical History:   Procedure Laterality Date    ABDOMINAL SURGERY      stents placed in liver and large intestines, per patient    CAROTID STENT      CORONARY STENT PLACEMENT  01/2000    DISSECTION OF NECK Bilateral 1/4/2022    Procedure: DISSECTION, NECK;  Surgeon: Naeem Smalls MD;  Location: [facility] OR [address] [place] [address];  Service: ENT;  Laterality: Bilateral;    ESOPHAGOGASTRODUODENOSCOPY W/ PEG N/A 1/4/2022    Procedure: EGD, WITH PEG TUBE INSERTION;  Surgeon: Anthony Calabrese MD;  Location: [facility] OR [address] [place] [address];  Service: General;  Laterality: N/A;    EYE SURGERY      Cataract bilateral    femoral stents      bilateral    FLAP PROCEDURE N/A 1/3/2022    Procedure: CREATION, FREE FLAP;  Surgeon: Naeem Smalls MD;  Location: [facility] OR [address] [place] [address];  Service: ENT;  Laterality: N/A;    FLAP PROCEDURE Right 1/4/2022    Procedure: CREATION, FREE FLAP;  Surgeon: Stacey Conde MD;  [facility];  Service: ENT;  Laterality: Right;  Ischemic start 1203  Ischemic stop 1353    GLOSSECTOMY N/A 1/4/2022    Procedure: GLOSSECTOMY;  Surgeon: Naeem Smalls MD;  Location: [facility] OR [address] [place] [address];  Service: ENT;  Laterality: N/A;    RADICAL NECK DISSECTION Left 1/3/2022    Procedure: DISSECTION, NECK, RADICAL;  Surgeon: Naeem Smalls MD;  Location: [facility] OR [address] [place] [address];  Service: ENT;  Laterality: Left;    SKIN CANCER EXCISION      TRACHEOTOMY N/A 1/4/2022    Procedure: TRACHEOTOMY;  Surgeon: Naeem Smalls MD;  Location: [facility] OR [address] [place] [address];  Service: ENT;  Laterality: N/A;       Review of patient's allergies indicates:  No Known Allergies    Family History    None       Tobacco Use    Smoking status: Former Smoker     Packs/day: 2.00     Years: 40.00     Pack years: 80.00     Types: Cigarettes     Start date: 4/17/1963     Quit  date: 2018     Years since quittin.0    Smokeless tobacco: Never Used    Tobacco comment: 3/3 ppd x 40 yrs. Currently 3-4 cigarettes daily .He is trying  to quit. Is using a Vapor cigarettes  2-3 x's a day.   Substance and Sexual Activity    Alcohol use: Yes     Alcohol/week: 3.0 standard drinks     Types: 3 Cans of beer per week     Comment: beer daily 3-4    Drug use: No    Sexual activity: Not Currently      Review of Systems   Constitutional:  Positive for fatigue. Negative for chills and fever.   HENT:  Positive for voice change. Negative for congestion and sore throat.    Respiratory:  Positive for cough and shortness of breath. Negative for choking and chest tightness.    Cardiovascular:  Negative for chest pain, palpitations and leg swelling.   Gastrointestinal:  Negative for abdominal pain, constipation, diarrhea, nausea and vomiting.   Genitourinary:  Negative for dysuria, flank pain and hematuria.   Musculoskeletal:  Negative for back pain.   Skin:  Negative for color change and rash.   Allergic/Immunologic: Positive for immunocompromised state.   Neurological:  Negative for light-headedness and headaches.   Objective:     Vital Signs (Most Recent):  Temp: 98.8 °F (37.1 °C) (22 0701)  Pulse: 87 (22 0800)  Resp: 18 (22 0800)  BP: 136/69 (22 0800)  SpO2: 95 % (22 0800) Vital Signs (24h Range):  Temp:  [96.9 °F (36.1 °C)-99.2 °F (37.3 °C)] 98.8 °F (37.1 °C)  Pulse:  [] 87  Resp:  [18-61] 18  SpO2:  [93 %-99 %] 95 %  BP: ()/(63-95) 136/69   Weight: 61.9 kg (136 lb 7.4 oz)  Body mass index is 20.75 kg/m².      Intake/Output Summary (Last 24 hours) at 2022 0804  Last data filed at 2022 0800  Gross per 24 hour   Intake 309.48 ml   Output 1700 ml   Net -1390.52 ml         Physical Exam  Constitutional:       General: He is not in acute distress.     Appearance: He is ill-appearing. He is not toxic-appearing.   HENT:      Head: Normocephalic and  atraumatic.      Nose: No congestion.      Mouth/Throat:      Pharynx: No oropharyngeal exudate or posterior oropharyngeal erythema.   Eyes:      General: No scleral icterus.     Conjunctiva/sclera: Conjunctivae normal.   Cardiovascular:      Rate and Rhythm: Normal rate and regular rhythm.   Pulmonary:      Effort: No respiratory distress.      Breath sounds: Rhonchi present.      Comments: Sitting upright, normal respiratory rate, in no acute distress  Audible secretions  Abdominal:      General: There is no distension.      Tenderness: There is no abdominal tenderness. There is no guarding.      Comments: No suprapubic tenderness to palpation   Musculoskeletal:         General: No swelling.      Right lower leg: No edema.      Left lower leg: No edema.   Skin:     General: Skin is warm.      Coloration: Skin is not jaundiced.   Neurological:      General: No focal deficit present.      Mental Status: He is alert.      Comments: Answers yes/no to questions appropriately and follows commands     Vents:  Vent Mode: Spont (04/19/22 1458)  Pressure Support: 10 cmH20 (04/19/22 1458)  PEEP/CPAP: 5 cmH20 (04/19/22 1458)  Oxygen Concentration (%): 35 (04/21/22 0800)  Peak Airway Pressure: 16 cmH2O (04/19/22 1458)  Plateau Pressure: 0 cmH20 (04/19/22 1458)  Total Ve: 0.77 mL (04/19/22 1458)  F/VT Ratio<105 (RSBI): 324.32 (04/19/22 1458)  Lines/Drains/Airways       Drain  Duration                  Gastrostomy/Enterostomy 01/04/22 Percutaneous endoscopic gastrostomy (PEG)  days              Airway  Duration                  Surgical Airway 01/04/22 1546 Shiley Cuffed 106 days              Peripheral Intravenous Line  Duration                  Peripheral IV - Single Lumen 04/19/22 0920 20 G Right Antecubital 1 day         Peripheral IV - Single Lumen 04/19/22 1552 18 G Anterior;Distal;Left Forearm 1 day                  Significant Labs:    CBC/Anemia Profile:  Recent Labs   Lab 04/19/22  1442 04/20/22  0531  04/21/22  0333   WBC 3.67* 3.95 4.88   HGB 6.5* 9.2* 9.8*   HCT 21.5* 28.6* 29.7*    261 287   MCV 98 90 88   RDW 17.8* 16.9* 16.7*        Chemistries:  Recent Labs   Lab 04/19/22  0925 04/19/22  1839 04/20/22  0531 04/21/22  0333   * 132* 134* 135*   K 5.6* 4.8 4.0 3.4*   CL 86* 88* 87* 85*   CO2 35* 38* 33* 40*   BUN 16 14 13 24*   CREATININE 0.5 0.6 0.6 0.7   CALCIUM 9.4 8.5* 8.8 9.1   ALBUMIN 2.5*  --  2.5* 2.5*   PROT 6.1  --  5.4* 6.2   BILITOT 0.2  --  0.5 0.2   ALKPHOS 82  --  81 76   ALT 17  --  15 15   AST 16  --  20 16   MG  --   --  1.6 2.2   PHOS  --   --  3.8 4.4       All pertinent labs within the past 24 hours have been reviewed.    Significant Imaging: I have reviewed all pertinent imaging results/findings within the past 24 hours.

## 2022-04-21 NOTE — ASSESSMENT & PLAN NOTE
Patient's with Normocytic anemia.. Hemoglobin stable. Etiology likely due to chronic disease .  Current CBC reviewed-  Recent Labs   Lab 04/19/22  1442 04/20/22  0531 04/21/22  0333   HGB 6.5* 9.2* 9.8*     Monitor CBC and transfuse if H/H drops below 7/21.

## 2022-04-21 NOTE — ASSESSMENT & PLAN NOTE
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 He is on 5L with trach collar at home. He has had increased yellow secretions, work of breathing and SOB for the past several days. BNP elevated to 1600 on admission with pulmonary edema and new cardiomegaly on CXR. Trop wnl. Bicarb is elevated suggesting chronic hypercapnia. Suctioning has cleared thick mucous. He was able to be weaned down to his home O2, however after ~ 20 minutes he desats again, requiring deep suction with resolution of hypoxia. Now stable on home O2. Diuresed -3L    - CAP coverage with ceftriaxone/azithro  - Methylprednisolone 40 mg x5 days for possible element of COPD exacerbation  - Duonebs q4  - Hypertonic saline nebs  - Chest PT  - Suction PRN

## 2022-04-21 NOTE — ASSESSMENT & PLAN NOTE
Nutrition consulted. Most recent weight and BMI monitored-          Malnutrition (Moderate to Severe)  Weight Loss (Malnutrition): greater than 10% in 6 months              Measurements:  Wt Readings from Last 1 Encounters:   04/20/22 61.9 kg (136 lb 7.4 oz)   Body mass index is 20.75 kg/m².    Recommendations: Recommendation/Intervention: 1.  Goals: Meet % EEN, EPN by RD f/u date    Patient has been screened and assessed by RD. RD will follow patient.

## 2022-04-21 NOTE — ASSESSMENT & PLAN NOTE
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 He is on 5L with trach collar at home. He has had increased yellow secretions, work of breathing and SOB for the past several days. BNP elevated to 1600 on admission with pulmonary edema and new cardiomegaly on CXR. Trop wnl. Bicarb is elevated suggesting chronic hypercapnia. Suctioning has cleared thick mucous. He was able to be weaned down to his home O2, however after ~ 20 minutes he desats again, requiring deep suction with resolution of hypoxia. Now stable on home O2. Diuresed -3L        -Pt stable on home O2 for 24 hours, ready to stepdown to floor  - CAP coverage with ceftriaxone/azithro  - Methylprednisolone 40 mg x5 days for possible element of COPD exacerbation  - Duonebs q4  - Hypertonic saline nebs  - Chest PT  - Suction PRN    4/21 Transfer to hospital medicine . 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  initially requiring vent but has now been stable on home trach collar for 24 hours. sats 93% on 8L via trach collar.  Awaiting cultures from sputum, on CAP coverage with ceftriaxone. completed Azithromycin. continue solumedrol 40mg IV daily.   4/22 sats 95% on trach collar 10/ fio2 60% . completed azithromycin.   started on Zosyn for possible aspiration  repeat CX ray-in the inferior hemithorax on the left side consistent with airspace consolidation/volume loss in the left lower lobe is again appreciated, but the lung zones are essentially stable since the prior exam and demonstrate no new areas of airspace consolidation or volume loss.  respiratory culture 4/19 with pseudomonas. BNP

## 2022-04-21 NOTE — PLAN OF CARE
CMICU DAILY GOALS       A: Awake    RASS: Goal - RASS Goal: 0-->alert and calm  Actual - RASS (Johnson Agitation-Sedation Scale): 0-->alert and calm   Restraint necessity:    B: Breathe   SBT: NA   C: Coordinate A & B, analgesics/sedatives   Pain: managed    SAT: NA  D: Delirium   CAM-ICU: Overall CAM-ICU: Negative  E: Early(intubated/ Progressive (non-intubated) Mobility   MOVE Screen: Pass   Activity: Activity Management: Rolling - L1  FAS: Feeding/Nutrition   Diet order: Diet/Nutrition Received: NPO, tube feeding,    T: Thrombus   DVT prophylaxis: VTE Required Core Measure: Pharmacological prophylaxis initiated/maintained  H: HOB Elevation   Head of Bed (HOB) Positioning: HOB at 30 degrees  U: Ulcer Prophylaxis   GI: yes  G: Glucose control   managed    S: Skin   Bathing/Skin Care: linen changed  Device Skin Pressure Protection: absorbent pad utilized/changed, adhesive use limited, positioning supports utilized, pressure points protected, skin-to-device areas padded, skin-to-skin areas padded  Pressure Reduction Devices: elbow protectors utilized, foam padding utilized, positioning supports utilized, pressure-redistributing mattress utilized, heel offloading device utilized  Pressure Reduction Techniques: heels elevated off bed, frequent weight shift encouraged, pressure points protected, weight shift assistance provided  Skin Protection: adhesive use limited, incontinence pads utilized, protective footwear used, silicone foam dressing in place, skin-to-device areas padded, skin-to-skin areas padded, transparent dressing maintained, tubing/devices free from skin contact  B: Bowel Function   no issues   I: Indwelling Catheters   Lainez necessity:     CVC necessity: No  D: De-escalation Antibiotics   Yes    Family/Goals of care/Code Status   Code Status: Full Code    24H Vital Sign Range  Temp:  [96.9 °F (36.1 °C)-99.2 °F (37.3 °C)]   Pulse:  []   Resp:  [18-61]   BP: ()/(62-95)   SpO2:  [91 %-99 %]       Shift Events   No acute events throughout shift. Radiation treatment this afternoon.    VS and assessment per flow sheet, patient progressing towards goals as tolerated, plan of care reviewed with Fareed Richard and family, all concerns addressed, will continue to monitor.    Jossy Alonso

## 2022-04-21 NOTE — CONSULTS
Radiation Oncology Consultation Note    Diagnosis:  Fareed Richard Jr. is a 72 y.o. 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 He is admitted with acute on chronic respiratory failure. He remains in the ICU, on 8L.     Will coordinate with inpatient team regarding re-initiation of RT and bring him down as soon as he is safe for transport and treatment. He will need to be able to lie flat with a mask and bite block for treatment.     Zaki Brunson MD  Radiation Oncology

## 2022-04-21 NOTE — PROGRESS NOTES
[facility]  Critical Care Medicine  Progress Note    Patient Name: Fareed Richard Jr.  MRN: [id number]  Admission Date: 4/19/2022  Hospital Length of Stay: 2 days  Code Status: Full Code  Attending Provider: Pema Cárdenas MD  Primary Care Provider: Kodi Tubbs MD   Principal Problem: Acute on chronic respiratory failure    Subjective:     HPI:  Mr. 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 Per wife he has had several days of increasing lethargy, increased work of breathing and secretions. Yesterday she became particularly concerned when he became slightly less responsive and interactive. Wife also notes increased yellow thick secretions from trach site. Upon EMS arrival he was on his home 5 L by trach collar and saturating 93% with significant wheezing.  He received 1 DuoNeb by them.  He was also suctioned.     Currently patient is alert, following commands.  He is responding to yes no questions.  He denies any chest pain but does endorse shortness of breath and cough.  He denies any abdominal pain or pain elsewhere      ED course:   He was given 500ccs IVF as well as one time doses of vanc/cefepime/azithro. Labs significant for BNP of 1600, WBC 5k, K 5.6, Bicarb 35, VBG 7.26/87/25/40. Trop wnl. CXR with new cardiomegaly and pulmonary edema. Suctioned with return of copious secretions. ICU was consulted for acute hypoxemic respiratory failure and he will be admitted for further management.      Hospital/ICU Course:  No notes on file    Past Medical History:   Diagnosis Date    Cancer     skin to Rt forearm and face    COPD (chronic obstructive pulmonary disease)     Hyperlipidemia     Hypertension     Pseudoaneurysm        Past Surgical History:   Procedure Laterality Date    ABDOMINAL SURGERY      stents placed in  liver and large intestines, per patient    CAROTID STENT      CORONARY STENT PLACEMENT  2000    DISSECTION OF NECK Bilateral 2022    Procedure: DISSECTION, NECK;  Surgeon: Naeem Smalls MD;  Location: [facility] OR [facility];  Service: ENT;  Laterality: Bilateral;    ESOPHAGOGASTRODUODENOSCOPY W/ PEG N/A 2022    Procedure: EGD, WITH PEG TUBE INSERTION;  Surgeon: Anthony Calabrese MD;  [facility];  Service: General;  Laterality: N/A;    EYE SURGERY      Cataract bilateral    femoral stents      bilateral    FLAP PROCEDURE N/A 1/3/2022    Procedure: CREATION, FREE FLAP;  Surgeon: Naeem Smalls MD;  Location: [facility] OR [facility];  Service: ENT;  Laterality: N/A;    FLAP PROCEDURE Right 2022    Procedure: CREATION, FREE FLAP;  Surgeon: Stacey Conde MD;  [facility];  Service: ENT;  Laterality: Right;  Ischemic start 1203  Ischemic stop 1353    GLOSSECTOMY N/A 2022    Procedure: GLOSSECTOMY;  Surgeon: Naeem Smalls MD;  [facility];  Service: ENT;  Laterality: N/A;    RADICAL NECK DISSECTION Left 1/3/2022    Procedure: DISSECTION, NECK, RADICAL;  Surgeon: Naeem Smalls MD;  [facility];  Service: ENT;  Laterality: Left;    SKIN CANCER EXCISION      TRACHEOTOMY N/A 2022    Procedure: TRACHEOTOMY;  Surgeon: Naeem Smalls MD;  [facility];  Service: ENT;  Laterality: N/A;       Review of patient's allergies indicates:  No Known Allergies    Family History    None       Tobacco Use    Smoking status: Former Smoker     Packs/day: 2.00     Years: 40.00     Pack years: 80.00     Types: Cigarettes     Start date: 1963     Quit date: 2018     Years since quittin.0    Smokeless tobacco: Never Used    Tobacco comment: 3/3 ppd x 40 yrs. Currently 3-4 cigarettes daily .He is trying  to quit. Is using a Vapor cigarettes  2-3 x's a day.   Substance and Sexual Activity    Alcohol use: Yes      Alcohol/week: 3.0 standard drinks     Types: 3 Cans of beer per week     Comment: beer daily 3-4    Drug use: No    Sexual activity: Not Currently      Review of Systems   Constitutional:  Positive for fatigue. Negative for chills and fever.   HENT:  Positive for voice change. Negative for congestion and sore throat.    Respiratory:  Positive for cough and shortness of breath. Negative for choking and chest tightness.    Cardiovascular:  Negative for chest pain, palpitations and leg swelling.   Gastrointestinal:  Negative for abdominal pain, constipation, diarrhea, nausea and vomiting.   Genitourinary:  Negative for dysuria, flank pain and hematuria.   Musculoskeletal:  Negative for back pain.   Skin:  Negative for color change and rash.   Allergic/Immunologic: Positive for immunocompromised state.   Neurological:  Negative for light-headedness and headaches.   Objective:     Vital Signs (Most Recent):  Temp: 98.8 °F (37.1 °C) (04/21/22 0701)  Pulse: 87 (04/21/22 0800)  Resp: 18 (04/21/22 0800)  BP: 136/69 (04/21/22 0800)  SpO2: 95 % (04/21/22 0800) Vital Signs (24h Range):  Temp:  [96.9 °F (36.1 °C)-99.2 °F (37.3 °C)] 98.8 °F (37.1 °C)  Pulse:  [] 87  Resp:  [18-61] 18  SpO2:  [93 %-99 %] 95 %  BP: ()/(63-95) 136/69   Weight: 61.9 kg (136 lb 7.4 oz)  Body mass index is 20.75 kg/m².      Intake/Output Summary (Last 24 hours) at 4/21/2022 0804  Last data filed at 4/21/2022 0800  Gross per 24 hour   Intake 309.48 ml   Output 1700 ml   Net -1390.52 ml         Physical Exam  Constitutional:       General: He is not in acute distress.     Appearance: He is ill-appearing. He is not toxic-appearing.   HENT:      Head: Normocephalic and atraumatic.      Nose: No congestion.      Mouth/Throat:      Pharynx: No oropharyngeal exudate or posterior oropharyngeal erythema.   Eyes:      General: No scleral icterus.     Conjunctiva/sclera: Conjunctivae normal.   Cardiovascular:      Rate and Rhythm: Normal rate  and regular rhythm.   Pulmonary:      Effort: No respiratory distress.      Breath sounds: Rhonchi present.      Comments: Sitting upright, normal respiratory rate, in no acute distress  Audible secretions  Abdominal:      General: There is no distension.      Tenderness: There is no abdominal tenderness. There is no guarding.      Comments: No suprapubic tenderness to palpation   Musculoskeletal:         General: No swelling.      Right lower leg: No edema.      Left lower leg: No edema.   Skin:     General: Skin is warm.      Coloration: Skin is not jaundiced.   Neurological:      General: No focal deficit present.      Mental Status: He is alert.      Comments: Answers yes/no to questions appropriately and follows commands     Vents:  Vent Mode: Spont (04/19/22 1458)  Pressure Support: 10 cmH20 (04/19/22 1458)  PEEP/CPAP: 5 cmH20 (04/19/22 1458)  Oxygen Concentration (%): 35 (04/21/22 0800)  Peak Airway Pressure: 16 cmH2O (04/19/22 1458)  Plateau Pressure: 0 cmH20 (04/19/22 1458)  Total Ve: 0.77 mL (04/19/22 1458)  F/VT Ratio<105 (RSBI): 324.32 (04/19/22 1458)  Lines/Drains/Airways       Drain  Duration                  Gastrostomy/Enterostomy 01/04/22 Percutaneous endoscopic gastrostomy (PEG)  days              Airway  Duration                  Surgical Airway 01/04/22 1546 Shiley Cuffed 106 days              Peripheral Intravenous Line  Duration                  Peripheral IV - Single Lumen 04/19/22 0920 20 G Right Antecubital 1 day         Peripheral IV - Single Lumen 04/19/22 1552 18 G Anterior;Distal;Left Forearm 1 day                  Significant Labs:    CBC/Anemia Profile:  Recent Labs   Lab 04/19/22  1442 04/20/22  0531 04/21/22  0333   WBC 3.67* 3.95 4.88   HGB 6.5* 9.2* 9.8*   HCT 21.5* 28.6* 29.7*    261 287   MCV 98 90 88   RDW 17.8* 16.9* 16.7*        Chemistries:  Recent Labs   Lab 04/19/22  0925 04/19/22  1839 04/20/22  0531 04/21/22  0333   * 132* 134* 135*   K 5.6* 4.8 4.0  3.4*   CL 86* 88* 87* 85*   CO2 35* 38* 33* 40*   BUN 16 14 13 24*   CREATININE 0.5 0.6 0.6 0.7   CALCIUM 9.4 8.5* 8.8 9.1   ALBUMIN 2.5*  --  2.5* 2.5*   PROT 6.1  --  5.4* 6.2   BILITOT 0.2  --  0.5 0.2   ALKPHOS 82  --  81 76   ALT 17  --  15 15   AST 16  --  20 16   MG  --   --  1.6 2.2   PHOS  --   --  3.8 4.4       All pertinent labs within the past 24 hours have been reviewed.    Significant Imaging: I have reviewed all pertinent imaging results/findings within the past 24 hours.      ABG  Recent Labs   Lab 04/19/22  1457 04/19/22  1808   PH 7.383 7.474*   PO2 26* 20*   PCO2 56.5* 56.7*   HCO3 33.6*  --    BE 9 18     Assessment/Plan:     Pulmonary  * Acute on chronic respiratory failure  Pt with SCC of the tongue s/p total glossectomy, chemowith cisplatin, and nearing completion of radiation with trach, COPD, asthma, HTN, CAD s/p PCI presenting for acute on chronic hypoxemic respiratory failure. He is on 5L with trach collar at home. He has had increased yellow secretions, work of breathing and SOB for the past several days. BNP elevated to 1600 on admission with pulmonary edema and new cardiomegaly on CXR. Trop wnl. Bicarb is elevated suggesting chronic hypercapnia. Suctioning has cleared thick mucous. He was able to be weaned down to his home O2, however after ~ 20 minutes he desats again, requiring deep suction with resolution of hypoxia. Now stable on home O2. Diuresed -3L    - CAP coverage with ceftriaxone/azithro  - Methylprednisolone 40 mg x5 days for possible element of COPD exacerbation  - Duonebs q4  - Hypertonic saline nebs  - Chest PT  - Suction PRN    Cardiac/Vascular  Other hyperlipidemia  -Continue home statin    Primary hypertension  Holding home anti-hypertensives, will resume later    Coronary artery disease involving native coronary artery of native heart without angina pectoris  Continue ASA, plavix, and statin    Oncology  Squamous cell cancer of tongue  12/15/21 FNA left neck mass :  squamous cell carcinoma, p16 negative  1/4/22 total glossectomy, bilateral neck dissection, bilateral cervical facial advancement flaps and anterolateral thigh free flap reconstruction of his glossectomy defect.  Final path :  [id number] (+microscopic SALINAS, single contralateral node), superior soft tissue margin of left neck with tumor.  2/28/22 start chemoradiation  Finished chemo with cisplatin 4/6. Was going to complete final dose of radiation tomorrow.           Critical Care Daily Checklist:    A: Awake: RASS Goal/Actual Goal: RASS Goal: 0-->alert and calm  Actual: Johnson Agitation Sedation Scale (RASS): Alert and calm   B: Spontaneous Breathing Trial Performed?     C: SAT & SBT Coordinated?  NA                      D: Delirium: CAM-ICU Overall CAM-ICU: Negative   E: Early Mobility Performed? Yes   F: Feeding Goal: Goals: Meet % EEN, EPN by RD f/u date  Status: Nutrition Goal Status: new   Current Diet Order   No orders of the defined types were placed in this encounter.      AS: Analgesia/Sedation NA   T: Thromboembolic Prophylaxis Yes   H: HOB > 300 Yes   U: Stress Ulcer Prophylaxis (if needed) NA   G: Glucose Control Yes   B: Bowel Function Stool Occurrence: 0   I: Indwelling Catheter (Lines & Lainez) Necessity Keep   D: De-escalation of Antimicrobials/Pharmacotherapies No    Plan for the day/ETD Wean O2    Code Status:  Family/Goals of Care: Full Code         Critical secondary to Patient has a condition that poses threat to life and bodily function: Acute on Chronic hypoxemic respiratory failure      Critical care was time spent personally by me on the following activities: development of treatment plan with patient or surrogate and bedside caregivers, discussions with consultants, evaluation of patient's response to treatment, examination of patient, ordering and performing treatments and interventions, ordering and review of laboratory studies, ordering and review of radiographic studies, pulse  oximetry, re-evaluation of patient's condition. This critical care time did not overlap with that of any other provider or involve time for any procedures.     Jonathan Leroy MD  Critical Care Medicine  [facility] - Cardiac Medical ICU

## 2022-04-21 NOTE — SUBJECTIVE & OBJECTIVE
Past Medical History:   Diagnosis Date    Cancer     skin to Rt forearm and face    COPD (chronic obstructive pulmonary disease)     Hyperlipidemia     Hypertension     Pseudoaneurysm        Past Surgical History:   Procedure Laterality Date    ABDOMINAL SURGERY      stents placed in liver and large intestines, per patient    CAROTID STENT      CORONARY STENT PLACEMENT  01/2000    DISSECTION OF NECK Bilateral 1/4/2022    Procedure: DISSECTION, NECK;  Surgeon: Naeem Smalls MD;  Location: [facility] OR [address] [place] [address];  Service: ENT;  Laterality: Bilateral;    ESOPHAGOGASTRODUODENOSCOPY W/ PEG N/A 1/4/2022    Procedure: EGD, WITH PEG TUBE INSERTION;  Surgeon: Anthony Calabrese MD;  Location: [facility] OR [address] [place] [address];  Service: General;  Laterality: N/A;    EYE SURGERY      Cataract bilateral    femoral stents      bilateral    FLAP PROCEDURE N/A 1/3/2022    Procedure: CREATION, FREE FLAP;  Surgeon: Naeem Smalls MD;  Location: [facility] OR [address] [place] [address];  Service: ENT;  Laterality: N/A;    FLAP PROCEDURE Right 1/4/2022    Procedure: CREATION, FREE FLAP;  Surgeon: Stacey Conde MD;  [facility];  Service: ENT;  Laterality: Right;  Ischemic start 1203  Ischemic stop 1353    GLOSSECTOMY N/A 1/4/2022    Procedure: GLOSSECTOMY;  Surgeon: Naeem Smalls MD;  Location: [facility] OR [address] [place] [address];  Service: ENT;  Laterality: N/A;    RADICAL NECK DISSECTION Left 1/3/2022    Procedure: DISSECTION, NECK, RADICAL;  Surgeon: Naeem Smalls MD;  Location: [facility] OR [address] [place] [address];  Service: ENT;  Laterality: Left;    SKIN CANCER EXCISION      TRACHEOTOMY N/A 1/4/2022    Procedure: TRACHEOTOMY;  Surgeon: Naeem Smalls MD;  Location: [facility] OR [address] [place] [address];  Service: ENT;  Laterality: N/A;       Review of patient's allergies indicates:  No Known Allergies    Family History    None       Tobacco Use    Smoking status: Former Smoker     Packs/day: 2.00     Years: 40.00     Pack years: 80.00     Types: Cigarettes     Start date: 4/17/1963     Quit  date: 2018     Years since quittin.0    Smokeless tobacco: Never Used    Tobacco comment: 3/3 ppd x 40 yrs. Currently 3-4 cigarettes daily .He is trying  to quit. Is using a Vapor cigarettes  2-3 x's a day.   Substance and Sexual Activity    Alcohol use: Yes     Alcohol/week: 3.0 standard drinks     Types: 3 Cans of beer per week     Comment: beer daily 3-4    Drug use: No    Sexual activity: Not Currently      Review of Systems   Constitutional:  Positive for fatigue. Negative for chills and fever.   HENT:  Positive for voice change. Negative for congestion and sore throat.    Respiratory:  Positive for cough. Negative for choking, chest tightness and shortness of breath.    Cardiovascular:  Negative for chest pain, palpitations and leg swelling.   Gastrointestinal:  Negative for abdominal pain, constipation, diarrhea, nausea and vomiting.   Genitourinary:  Negative for dysuria, flank pain and hematuria.   Musculoskeletal:  Negative for back pain.   Skin:  Negative for color change and rash.   Allergic/Immunologic: Positive for immunocompromised state.   Neurological:  Negative for light-headedness and headaches.   Objective:     Vital Signs (Most Recent):  Temp: 98.8 °F (37.1 °C) (22 0701)  Pulse: 87 (22 0800)  Resp: 18 (22 0800)  BP: 136/69 (22 0800)  SpO2: 95 % (22 0800) Vital Signs (24h Range):  Temp:  [96.9 °F (36.1 °C)-99.2 °F (37.3 °C)] 98.8 °F (37.1 °C)  Pulse:  [] 87  Resp:  [18-61] 18  SpO2:  [93 %-99 %] 95 %  BP: ()/(63-95) 136/69   Weight: 61.9 kg (136 lb 7.4 oz)  Body mass index is 20.75 kg/m².      Intake/Output Summary (Last 24 hours) at 2022 0922  Last data filed at 2022 0800  Gross per 24 hour   Intake 907.26 ml   Output 1450 ml   Net -542.74 ml         Physical Exam  Constitutional:       General: He is not in acute distress.     Appearance: He is ill-appearing. He is not toxic-appearing.   HENT:      Head: Normocephalic and atraumatic.       Nose: No congestion.      Mouth/Throat:      Pharynx: No oropharyngeal exudate or posterior oropharyngeal erythema.   Eyes:      General: No scleral icterus.     Conjunctiva/sclera: Conjunctivae normal.   Cardiovascular:      Rate and Rhythm: Normal rate and regular rhythm.   Pulmonary:      Effort: No respiratory distress.      Breath sounds: Rhonchi present.      Comments: Sitting upright, normal respiratory rate, in no acute distress  Audible secretions  Abdominal:      General: There is no distension.      Tenderness: There is no abdominal tenderness. There is no guarding.      Comments: No suprapubic tenderness to palpation   Musculoskeletal:         General: No swelling.      Right lower leg: No edema.      Left lower leg: No edema.   Skin:     General: Skin is warm.      Coloration: Skin is not jaundiced.   Neurological:      General: No focal deficit present.      Mental Status: He is alert.      Comments: Answers yes/no to questions appropriately and follows commands     Vents:  Vent Mode: Spont (04/19/22 1458)  Pressure Support: 10 cmH20 (04/19/22 1458)  PEEP/CPAP: 5 cmH20 (04/19/22 1458)  Oxygen Concentration (%): 35 (04/21/22 0800)  Peak Airway Pressure: 16 cmH2O (04/19/22 1458)  Plateau Pressure: 0 cmH20 (04/19/22 1458)  Total Ve: 0.77 mL (04/19/22 1458)  F/VT Ratio<105 (RSBI): 324.32 (04/19/22 1458)  Lines/Drains/Airways       Drain  Duration                  Gastrostomy/Enterostomy 01/04/22 Percutaneous endoscopic gastrostomy (PEG)  days              Airway  Duration                  Surgical Airway 01/04/22 1546 Shiley Cuffed 106 days              Peripheral Intravenous Line  Duration                  Peripheral IV - Single Lumen 04/19/22 0920 20 G Right Antecubital 2 days         Peripheral IV - Single Lumen 04/19/22 1552 18 G Anterior;Distal;Left Forearm 1 day                  Significant Labs:    CBC/Anemia Profile:  Recent Labs   Lab 04/19/22  1442 04/20/22  0531 04/21/22  0333   WBC  3.67* 3.95 4.88   HGB 6.5* 9.2* 9.8*   HCT 21.5* 28.6* 29.7*    261 287   MCV 98 90 88   RDW 17.8* 16.9* 16.7*        Chemistries:  Recent Labs   Lab 04/19/22  0925 04/19/22  1839 04/20/22  0531 04/21/22  0333   * 132* 134* 135*   K 5.6* 4.8 4.0 3.4*   CL 86* 88* 87* 85*   CO2 35* 38* 33* 40*   BUN 16 14 13 24*   CREATININE 0.5 0.6 0.6 0.7   CALCIUM 9.4 8.5* 8.8 9.1   ALBUMIN 2.5*  --  2.5* 2.5*   PROT 6.1  --  5.4* 6.2   BILITOT 0.2  --  0.5 0.2   ALKPHOS 82  --  81 76   ALT 17  --  15 15   AST 16  --  20 16   MG  --   --  1.6 2.2   PHOS  --   --  3.8 4.4       All pertinent labs within the past 24 hours have been reviewed.    Significant Imaging: I have reviewed all pertinent imaging results/findings within the past 24 hours.

## 2022-04-21 NOTE — PLAN OF CARE
Patient is current with [facility] and wish to resume upon discharge.      Madina Bernard RN     [phone]

## 2022-04-21 NOTE — ASSESSMENT & PLAN NOTE
12/15/21 FNA left neck mass : squamous cell carcinoma, p16 negative  1/4/22 total glossectomy, bilateral neck dissection, bilateral cervical facial advancement flaps and anterolateral thigh free flap reconstruction of his glossectomy defect.  Final path :  [id number] (+microscopic SALINAS, single contralateral node), superior soft tissue margin of left neck with tumor.  2/28/22 start chemoradiation  Finished chemo with cisplatin 4/6. Was going to complete final dose of radiation tomorrow.     -Rad/onc consulted for radiation while inpatient  4/22  s/p radiation oncology eval -on adjuvant chemoradiation, completed 31/33 fractions of RT.  renitiation of RT  inpatient when patient able to tolerate.

## 2022-04-22 ENCOUNTER — DOCUMENTATION ONLY (OUTPATIENT)
Dept: RADIATION ONCOLOGY | Facility: CLINIC | Age: 72
End: 2022-04-22
Payer: MEDICARE

## 2022-04-22 PROBLEM — J69.0 ASPIRATION PNEUMONIA: Status: ACTIVE | Noted: 2022-04-22

## 2022-04-22 PROBLEM — R00.0 SINUS TACHYCARDIA: Status: ACTIVE | Noted: 2022-04-22

## 2022-04-22 LAB
ALBUMIN SERPL BCP-MCNC: 2.6 G/DL (ref 3.5–5.2)
ALP SERPL-CCNC: 82 U/L (ref 55–135)
ALT SERPL W/O P-5'-P-CCNC: 21 U/L (ref 10–44)
ANION GAP SERPL CALC-SCNC: 7 MMOL/L (ref 8–16)
AST SERPL-CCNC: 22 U/L (ref 10–40)
BASOPHILS # BLD AUTO: 0.01 K/UL (ref 0–0.2)
BASOPHILS NFR BLD: 0.1 % (ref 0–1.9)
BILIRUB SERPL-MCNC: 0.3 MG/DL (ref 0.1–1)
BNP SERPL-MCNC: 132 PG/ML (ref 0–99)
BUN SERPL-MCNC: 42 MG/DL (ref 8–23)
CALCIUM SERPL-MCNC: 9.3 MG/DL (ref 8.7–10.5)
CHLORIDE SERPL-SCNC: 90 MMOL/L (ref 95–110)
CO2 SERPL-SCNC: 41 MMOL/L (ref 23–29)
CREAT SERPL-MCNC: 0.7 MG/DL (ref 0.5–1.4)
DIFFERENTIAL METHOD: ABNORMAL
EOSINOPHIL # BLD AUTO: 0 K/UL (ref 0–0.5)
EOSINOPHIL NFR BLD: 0 % (ref 0–8)
ERYTHROCYTE [DISTWIDTH] IN BLOOD BY AUTOMATED COUNT: 17.1 % (ref 11.5–14.5)
EST. GFR  (AFRICAN AMERICAN): >60 ML/MIN/1.73 M^2
EST. GFR  (NON AFRICAN AMERICAN): >60 ML/MIN/1.73 M^2
GLUCOSE SERPL-MCNC: 126 MG/DL (ref 70–110)
HCT VFR BLD AUTO: 32.8 % (ref 40–54)
HGB BLD-MCNC: 10.3 G/DL (ref 14–18)
IMM GRANULOCYTES # BLD AUTO: 0.04 K/UL (ref 0–0.04)
IMM GRANULOCYTES NFR BLD AUTO: 0.5 % (ref 0–0.5)
LYMPHOCYTES # BLD AUTO: 0.5 K/UL (ref 1–4.8)
LYMPHOCYTES NFR BLD: 6.7 % (ref 18–48)
MAGNESIUM SERPL-MCNC: 2.1 MG/DL (ref 1.6–2.6)
MCH RBC QN AUTO: 29.4 PG (ref 27–31)
MCHC RBC AUTO-ENTMCNC: 31.4 G/DL (ref 32–36)
MCV RBC AUTO: 94 FL (ref 82–98)
MONOCYTES # BLD AUTO: 1.2 K/UL (ref 0.3–1)
MONOCYTES NFR BLD: 15.2 % (ref 4–15)
NEUTROPHILS # BLD AUTO: 6.2 K/UL (ref 1.8–7.7)
NEUTROPHILS NFR BLD: 77.5 % (ref 38–73)
NRBC BLD-RTO: 0 /100 WBC
PHOSPHATE SERPL-MCNC: 2.9 MG/DL (ref 2.7–4.5)
PLATELET # BLD AUTO: 317 K/UL (ref 150–450)
PMV BLD AUTO: 8.7 FL (ref 9.2–12.9)
POTASSIUM SERPL-SCNC: 3.5 MMOL/L (ref 3.5–5.1)
PROT SERPL-MCNC: 6.2 G/DL (ref 6–8.4)
RBC # BLD AUTO: 3.5 M/UL (ref 4.6–6.2)
SODIUM SERPL-SCNC: 138 MMOL/L (ref 136–145)
WBC # BLD AUTO: 8.02 K/UL (ref 3.9–12.7)

## 2022-04-22 PROCEDURE — 99233 SBSQ HOSP IP/OBS HIGH 50: CPT | Mod: ,,, | Performed by: HOSPITALIST

## 2022-04-22 PROCEDURE — 99900026 HC AIRWAY MAINTENANCE (STAT)

## 2022-04-22 PROCEDURE — 25000242 PHARM REV CODE 250 ALT 637 W/ HCPCS: Performed by: INTERNAL MEDICINE

## 2022-04-22 PROCEDURE — 94640 AIRWAY INHALATION TREATMENT: CPT

## 2022-04-22 PROCEDURE — 36415 COLL VENOUS BLD VENIPUNCTURE: CPT | Performed by: HOSPITALIST

## 2022-04-22 PROCEDURE — 77386 HC IMRT, COMPLEX: CPT | Performed by: STUDENT IN AN ORGANIZED HEALTH CARE EDUCATION/TRAINING PROGRAM

## 2022-04-22 PROCEDURE — 27100171 HC OXYGEN HIGH FLOW UP TO 24 HOURS

## 2022-04-22 PROCEDURE — 25000003 PHARM REV CODE 250: Performed by: STUDENT IN AN ORGANIZED HEALTH CARE EDUCATION/TRAINING PROGRAM

## 2022-04-22 PROCEDURE — 77014 PR  CT GUIDANCE PLACEMENT RAD THERAPY FIELDS: ICD-10-PCS | Mod: 26,,, | Performed by: STUDENT IN AN ORGANIZED HEALTH CARE EDUCATION/TRAINING PROGRAM

## 2022-04-22 PROCEDURE — 25000003 PHARM REV CODE 250: Performed by: HOSPITALIST

## 2022-04-22 PROCEDURE — 77014 HC CT GUIDANCE RADIATION THERAPY FLDS PLACEMENT: CPT | Mod: TC | Performed by: STUDENT IN AN ORGANIZED HEALTH CARE EDUCATION/TRAINING PROGRAM

## 2022-04-22 PROCEDURE — 94761 N-INVAS EAR/PLS OXIMETRY MLT: CPT

## 2022-04-22 PROCEDURE — 36415 COLL VENOUS BLD VENIPUNCTURE: CPT

## 2022-04-22 PROCEDURE — 25000242 PHARM REV CODE 250 ALT 637 W/ HCPCS: Performed by: STUDENT IN AN ORGANIZED HEALTH CARE EDUCATION/TRAINING PROGRAM

## 2022-04-22 PROCEDURE — 83880 ASSAY OF NATRIURETIC PEPTIDE: CPT | Performed by: HOSPITALIST

## 2022-04-22 PROCEDURE — 99900035 HC TECH TIME PER 15 MIN (STAT)

## 2022-04-22 PROCEDURE — 20600001 HC STEP DOWN PRIVATE ROOM

## 2022-04-22 PROCEDURE — 63600175 PHARM REV CODE 636 W HCPCS: Performed by: EMERGENCY MEDICINE

## 2022-04-22 PROCEDURE — 27000221 HC OXYGEN, UP TO 24 HOURS

## 2022-04-22 PROCEDURE — 83735 ASSAY OF MAGNESIUM: CPT

## 2022-04-22 PROCEDURE — 85025 COMPLETE CBC W/AUTO DIFF WBC: CPT | Performed by: STUDENT IN AN ORGANIZED HEALTH CARE EDUCATION/TRAINING PROGRAM

## 2022-04-22 PROCEDURE — 84100 ASSAY OF PHOSPHORUS: CPT

## 2022-04-22 PROCEDURE — 99233 PR SUBSEQUENT HOSPITAL CARE,LEVL III: ICD-10-PCS | Mod: ,,, | Performed by: HOSPITALIST

## 2022-04-22 PROCEDURE — 63600175 PHARM REV CODE 636 W HCPCS: Performed by: INTERNAL MEDICINE

## 2022-04-22 PROCEDURE — 63600175 PHARM REV CODE 636 W HCPCS: Performed by: HOSPITALIST

## 2022-04-22 PROCEDURE — 77014 PR  CT GUIDANCE PLACEMENT RAD THERAPY FIELDS: CPT | Mod: 26,,, | Performed by: STUDENT IN AN ORGANIZED HEALTH CARE EDUCATION/TRAINING PROGRAM

## 2022-04-22 PROCEDURE — 80053 COMPREHEN METABOLIC PANEL: CPT

## 2022-04-22 RX ORDER — FUROSEMIDE 10 MG/ML
40 INJECTION INTRAMUSCULAR; INTRAVENOUS ONCE
Status: CANCELLED | OUTPATIENT
Start: 2022-04-22

## 2022-04-22 RX ADMIN — SODIUM CHLORIDE SOLN NEBU 3% 4 ML: 3 NEBU SOLN at 09:04

## 2022-04-22 RX ADMIN — PIPERACILLIN SODIUM AND TAZOBACTAM SODIUM 4.5 G: 4; .5 INJECTION, POWDER, FOR SOLUTION INTRAVENOUS at 08:04

## 2022-04-22 RX ADMIN — PIPERACILLIN SODIUM AND TAZOBACTAM SODIUM 4.5 G: 4; .5 INJECTION, POWDER, FOR SOLUTION INTRAVENOUS at 12:04

## 2022-04-22 RX ADMIN — ATORVASTATIN CALCIUM 20 MG: 20 TABLET, FILM COATED ORAL at 08:04

## 2022-04-22 RX ADMIN — THIAMINE HCL TAB 100 MG 100 MG: 100 TAB at 08:04

## 2022-04-22 RX ADMIN — IPRATROPIUM BROMIDE AND ALBUTEROL SULFATE 3 ML: 2.5; .5 SOLUTION RESPIRATORY (INHALATION) at 12:04

## 2022-04-22 RX ADMIN — IPRATROPIUM BROMIDE AND ALBUTEROL SULFATE 3 ML: 2.5; .5 SOLUTION RESPIRATORY (INHALATION) at 04:04

## 2022-04-22 RX ADMIN — MELATONIN TAB 3 MG 6 MG: 3 TAB at 08:04

## 2022-04-22 RX ADMIN — IPRATROPIUM BROMIDE AND ALBUTEROL SULFATE 3 ML: 2.5; .5 SOLUTION RESPIRATORY (INHALATION) at 09:04

## 2022-04-22 RX ADMIN — GLYCOPYRROLATE 1 MG: 1 LIQUID ORAL at 10:04

## 2022-04-22 RX ADMIN — GLYCOPYRROLATE 1 MG: 1 LIQUID ORAL at 04:04

## 2022-04-22 RX ADMIN — ASPIRIN 81 MG CHEWABLE TABLET 81 MG: 81 TABLET CHEWABLE at 08:04

## 2022-04-22 RX ADMIN — IPRATROPIUM BROMIDE AND ALBUTEROL SULFATE 3 ML: 2.5; .5 SOLUTION RESPIRATORY (INHALATION) at 08:04

## 2022-04-22 RX ADMIN — METHYLPREDNISOLONE SODIUM SUCCINATE 40 MG: 40 INJECTION, POWDER, FOR SOLUTION INTRAMUSCULAR; INTRAVENOUS at 08:04

## 2022-04-22 RX ADMIN — POTASSIUM BICARBONATE 25 MEQ: 978 TABLET, EFFERVESCENT ORAL at 04:04

## 2022-04-22 RX ADMIN — GLYCOPYRROLATE 1 MG: 1 LIQUID ORAL at 08:04

## 2022-04-22 RX ADMIN — FOLIC ACID 1 MG: 1 TABLET ORAL at 08:04

## 2022-04-22 RX ADMIN — CLOPIDOGREL 75 MG: 75 TABLET, FILM COATED ORAL at 08:04

## 2022-04-22 RX ADMIN — ENOXAPARIN SODIUM 40 MG: 40 INJECTION SUBCUTANEOUS at 04:04

## 2022-04-22 RX ADMIN — SODIUM CHLORIDE SOLN NEBU 3% 4 ML: 3 NEBU SOLN at 04:04

## 2022-04-22 NOTE — PLAN OF CARE
Alert and oriented x4. Trach in place on 10L maintaining 89-92%. PEG tube in place. Pt refusing feeding boluses during the night. Free from falls and injuries during shift. No concerns or requests voiced. Bed low with side rails up x2. Call bell within reach. Will continue plan of care.

## 2022-04-22 NOTE — PHYSICIAN QUERY
"PT Name: Fareed Richard Jr.  MR #: [id number]    DOCUMENTATION CLARIFICATION      CDS: Alex Puentes RN CCDS               Contact information: [email or web address]     This form is a permanent document in the medical record.    Query Date: April 22, 2022    By submitting this query, we are merely seeking further clarification of documentation. Please utilize your independent clinical judgment when addressing the question(s) below.    The medical record contains the following:   Indicators   Supporting Clinical Findings Location in Medical Record     x "Pulmonary Edema" pulmonary edema 4/19/2022 Critical Care H&P     x Wheezing, Productive cough, SOB, SERRANO, Use of accessory muscles shortness of breath, significant wheezing, respiratory distress. Tracheostomy in place with thick yellow secretions. 4/19/2022 ED provider notes     x Radiology Findings New onset cardiomegaly and edema.  This could be CHF less likely pneumonia.    Interval improvement in pulmonary edema when compared to radiograph 04/19/2022.  Mild residual interstitial edema on today's imaging. Cardiomegaly and small bilateral pleural effusions. 4/19/2022 Chest x-ray      4/21/2022 Chest x-ray     x CHF documented/Respiratory Failure documented acute on chronic hypoxemic respiratory failure  Acute on chronic hypercapnic respiratory failure: resolved. Likely 2/2 infection 4/19/2022 Critical Care H&P  4/20/2022 Critical Care plan of care     x Respiratory condition CAP coverage with ceftriaxone/azithro  COPD 4/19/2022 Critical Care H&P     x RR                  PaO2                  PaCO2                     O2 sat O2 sat 91-95% on 15L O2, RR 16-26     04/19/22 09:33   POC PH 7.232 (L)   POC PCO2 101.5 (H)   POC PO2 16 (L)   POC BE 15   POC HCO3 42.7 (H)   POC SATURATED O2 14 (L)   POC TCO2 46 (H)   FiO2 40   Flow 15   Sample VENOUS   DelSys T-Collar   Allens Test N/A   Site Other   Mode SPONT    4/19/2022 Vitals    VBGs     x Treatment: furosemide 40 mg " IV q8h 4/19-4/20/2022 Medication     x Supplemental O2: placed on ventilation via BiPAP. Pt had rapidly decompensating resp failure, and failed PS hrough his trach.  Pt had hyercapneic resp failure and was place on a vent  4/19/2022 ED provider notes     x Oxygen dependence He is on 5L with trach collar at home.  4/19/2022 Critical Care H&P     x Other: The left ventricle is normal in size with normal systolic function. The estimated ejection fraction is 60%.  Normal right ventricular size with normal right ventricular systolic function.  Normal left ventricular diastolic function.  The aortic root is mildly dilated.  Mechanically ventilated; cannot use inferior caval vein diameter to estimate central venous pressure. The IVC is not enlarged and does collapse somewhat with the respiratory cycle, essentially ruling out venous hypertension. 4/19/2022 Echo report       Provider, please clarify the diagnosis of Pulmonary Edema associated with above clinical findings.    [    ] Acute pulmonary edema, non-cardiac cause (please specify causative condition): ___________________   [    ] Acute pulmonary edema, other cause (please specify causative condition): ___________________   [    ] Acute pulmonary edema, unspecified cause   [    ] Acute and/on chronic pulmonary edema, non-cardiac cause (please specify causative condition): ___________________   [    ] Acute and/on chronic pulmonary edema, other cause (please specify causative condition): ___________________   [    ] Acute and/on chronic pulmonary edema, unspecified cause   [    ] Pulmonary edema, unspecified cause and acuity   [    ] Other respiratory diagnosis (please specify): _________________________________    [ x  ] Clinically Undetermined         Please document in your progress notes daily for the duration of treatment, until resolved, and include in your discharge summary.

## 2022-04-22 NOTE — NURSING
RRT working with pt changing out trach and noted pt coughed up TF, RN entered room and noted TF coming out trach while pt was coughing. Dr. Armenta notified, of aspiration and PEG residual 260ml, which was returned to pt. new orders IV antibx and to hold noon TF, recheck residual before @1600 feeding and notify before giving feeding.

## 2022-04-22 NOTE — PLAN OF CARE
Home Health referral sent to [facility]  [phone] via [facility] as pt is current and would like to resume services at d/c per previous CM note.  Will continue to follow for d/c needs.    Update: Per Janette with [facility], pt will be unable to resume HH with them as they had an SINGH for pt with [facility] that they have declined to continue.  Pt will need HH services from another company at d/c.     Trudi Nina RN CM  Case Management  [phone]

## 2022-04-22 NOTE — ASSESSMENT & PLAN NOTE
4/22 sats 95% on trach collar 10/ fio2 60% . completed azithromycin.   started on Zosyn for possible aspiration  repeat CX ray-in the inferior hemithorax on the left side consistent with airspace consolidation/volume loss in the left lower lobe is again appreciated, but the lung zones are essentially stable since the prior exam and demonstrate no new areas of airspace consolidation or volume loss.  respiratory culture 4/19 with pseudomonas.

## 2022-04-22 NOTE — NURSING TRANSFER
Nursing Transfer Note      4/22/2022     Reason patient is being transferred: Stepdown from ICU    Transfer FROM: 6080 TO: 8094    Transfer via stretcher    Transfer with O2, cardiac monitoring, cans of Impact Peptide, and all belongings.    Transported by KAMILAH Castanon and Luis Fernando ZAMORANO    Medicines sent: glycopyrrolate solution, polyethylene packet, and [employer]'s solution    Chart send with patient: Yes    Attempted to Notify spouse. Called 2x. Left voicemail asking her to call for the [address] [place] [facility] for more information.    Upon arrival to floor: cardiac monitor applied, patient oriented to room, call bell in reach and bed in lowest position, trach collar connected to room O2, urinal within reach. Nurse Soco at bedside.

## 2022-04-22 NOTE — RESPIRATORY THERAPY
RAPID RESPONSE RESPIRATORY THERAPY PROACTIVE NOTE           Time of visit: 1028     Code Status: Full Code   : 1950  Bed: 8059/8094 A:   MRN: [id number]  Time spent at the bedside: 15 -30 min    SITUATION    Evaluated patient for: LDA Check     BACKGROUND    Patient has a past medical history of Cancer, COPD (chronic obstructive pulmonary disease), Hyperlipidemia, Hypertension, and Pseudoaneurysm.  Clinically Significant Surgical Hx: tracheostomy    24 Hours Vitals Range:  Temp:  [98 °F (36.7 °C)-98.8 °F (37.1 °C)]   Pulse:  []   Resp:  [14-39]   BP: (102-168)/(65-76)   SpO2:  [85 %-97 %]     Labs:    Recent Labs     22  0531 22  0333 22  0244   * 135* 138   K 4.0 3.4* 3.5   CL 87* 85* 90*   CO2 33* 40* 41*   CREATININE 0.6 0.7 0.7    129* 126*   PHOS 3.8 4.4 2.9   MG 1.6 2.2 2.1        Recent Labs     22  1457 22  1808   PH 7.383 7.474*   PCO2 56.5* 56.7*   PO2 26* 20*   HCO3 33.6*  --    POCSATURATED 46* 34*   BE 9 18       ASSESSMENT/INTERVENTIONS    Upon arrival in room Pt with audible secretions in trach tube requiring suctioning.    Last VS   Temp: 98.1 °F (36.7 °C) ( 113)  Pulse: 104 ( 1355)  Resp: 34 ( 1355)  BP: 138/70 ( 1132)  SpO2: 93 % ( 1355)    Level of Consciousness: Level of Consciousness (AVPU): alert  Respiratory Effort: Respiratory Effort: Unlabored Expansion/Accessory Muscle Usage: Expansion/Accessory Muscles/Retractions: no use of accessory muscles, no retractions, expansion symmetric  All Lung Field Breath Sounds: All Lung Fields Breath Sounds: coarse  DEE Breath Sounds: coarse  LLL Breath Sounds: diminished  RUL Breath Sounds: coarse  RML Breath Sounds: diminished  RLL Breath Sounds: diminished  O2 Device/Concentration: Flow (L/min): 10, Oxygen Concentration (%): 60, O2 Device (Oxygen Therapy): Trach Collar  Surgical airway: Yes, Type: Shiley Size: 6   Ambu at bedside: Ambu bag with the patient?: Yes, Adult Ambu      Active Orders   Respiratory Care    Chest physiotherapy TID     Frequency: TID     Number of Occurrences: Until Specified     Order Questions:      Indications: COPIOUS SPUTUM PRODUCTION      Indications: ATELECTASIS PROPHYLAXIS      Indications: ATELECTASIS NONRESPONSIVE TO TX    Inhalation Treatment Q4H     Frequency: Q4H     Number of Occurrences: Until Specified    Oxygen Continuous     Frequency: Continuous     Number of Occurrences: Until Specified     Order Questions:      Device type: Low flow      Device: Trach Collar (Comment: 8L)      FiO2%: 35%      Titrate O2 per Oxygen Titration Protocol: Yes      To maintain SpO2 goal of: >= 90%      Notify MD of: Inability to achieve desired SpO2; Sudden change in patient status and requires 20% increase in FiO2; Patient requires >60% FiO2    Pulse Oximetry Continuous     Frequency: Continuous     Number of Occurrences: Until Specified    Routine tracheostomy care     Frequency: BID     Number of Occurrences: Until Specified       RECOMMENDATIONS    We recommend: RRT Recs: Suctioned pt.  Pt emesis with appearance of tube feed aspirated and coughed up through trach tube.  Nursing made aware of aspiration    ESCALATION        FOLLOW-UP    Please call back the Rapid Response RTTyrese, RRT at x [phone] for any questions or concerns.

## 2022-04-22 NOTE — NURSING
Pt arrived to unit, awake and alert.  No complaints voiced at this time.  Refused peg feeding for the remainder of the night.  States he feels full. Respiratory notified of pt's arrival.

## 2022-04-22 NOTE — PHYSICIAN QUERY
PT Name: Fareed Richard Jr.  MR #: [id number]    DOCUMENTATION CLARIFICATION      CDS: Alex Puentes RN CCDS               Contact information: [email or web address]     This form is a permanent document in the medical record.      Query Date: April 22, 2022    By submitting this query, we are merely seeking further clarification of documentation. Please utilize your independent clinical judgment when addressing the question(s) below.    The Medical Record contains the following:   Indicators  Supporting Clinical Findings Location in Medical Record     x Anemia documented Anemia   4/20/2022 Critical Care plan of care     x H&H  04/19/22 14:42 04/20/22 05:31 04/21/22 03:33 04/22/22 02:44   Hemoglobin 6.5 (L) 9.2 (L) 9.8 (L) 10.3 (L)   Hematocrit 21.5 (L) 28.6 (L) 29.7 (L) 32.8 (L)    Lab values     x BP                    HR SBP /DBP 53-60, HR 79-88 4/19/2022 Vitals    GI bleeding documented      Acute bleeding (Non GI site)       x Transfusion(s) Transfused 1 unit RBCs overnight   4/20/2022 Critical Care plan of care     x Acute/Chronic illness Squamous cell cancer of tongue  2/28/22 start chemoradiation  Finished chemo with cisplatin 4/6. Was going to complete final dose of radiation tomorrow. 4/19/2022 Critical Care H&P    Treatments       x Other No active signs of bleeding.     Per wife after chemo yesterday patient has been more lethargic. 4/20/2022 Critical Care plan of care    4/19/2022 ED provider notes     Provider, please clarify the diagnosis of Anemia associated with above clinical findings.     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    Form No. [id number]

## 2022-04-22 NOTE — PROGRESS NOTES
[facility] - Cardiac Medical ICU  [facility] Medicine  Progress Note    Patient Name: Fareed Richard Jr.  MRN: [id number]  Patient Class: IP- Inpatient   Admission Date: 4/19/2022  Length of Stay: 3 days  Attending Physician: Jordan Armenta MD  Primary Care Provider: Kodi Tubbs MD        Subjective:     Principal Problem:Acute on chronic respiratory failure        HPI:  Mr. 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 Per wife he has had several days of increasing lethargy, increased work of breathing and secretions. Yesterday she became particularly concerned when he became slightly less responsive and interactive. Wife also notes increased yellow thick secretions from trach site. Upon EMS arrival he was on his home 5 L by trach collar and saturating 93% with significant wheezing.  He received 1 DuoNeb by them.  He was also suctioned.     Currently patient is alert, following commands.  He is responding to yes no questions.  He denies any chest pain but does endorse shortness of breath and cough.  He denies any abdominal pain or pain elsewhere       ED course:   He was given 500ccs IVF as well as one time doses of vanc/cefepime/azithro. Labs significant for BNP of 1600, WBC 5k, K 5.6, Bicarb 35, VBG 7.26/87/25/40. Trop wnl. CXR with new cardiomegaly and pulmonary edema. Suctioned with return of copious secretions. ICU was consulted for acute hypoxemic respiratory failure and he will be admitted for further management.              Overview/Hospital Course:    Admitted to ICU, started on CAP coverage and solumedrol. Quickly weaned off vent after return of copious secretions on suction. Has been maintaining sats well on home O2. Rad/onc consulted for completion of radiology while inpatient. Pending culture sensitivity.    4/21 Transfer to hospital  medicine . 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  initially requiring vent but has now been stable on home trach collar for 24 hours. sats 93% on 8L via trach collar.  Awaiting cultures from sputum, on CAP coverage with ceftriaxone. completed Azithromycin. continue solumedrol 40mg IV daily.  rad/onc consulted this morning-he was supposed to complete radiation outpt but was admitted, attempting to set up for inpatient  4/22 changed to bolus GT feedings. s/p radiation oncology eval -on adjuvant chemoradiation, completed 31/33 fractions of RT.  renitiation of RT  inpatient when patient able to tolerate. sats 95% on trach collar 10/ fio2 60% . completed azithromycin.  started on Zosyn for possible aspiration  repeat CX ray-in the inferior hemithorax on the left side consistent with airspace consolidation/volume loss in the left lower lobe is again appreciated, but the lung zones are essentially stable since the prior exam and demonstrate no new areas of airspace consolidation or volume loss. respiratory culture 4/19 with pseudomonas.              Review of Systems:   Pain scale:   Constitutional:  fever,  chills, headache, vision loss, hearing loss, weight loss, Generalized weakness, falls, loss of smell, loss of taste, poor appetite,  sore throat, voice change,immunocompromised state.   Respiratory: cough, shortness of breath.   Cardiovascular: chest pain, dizziness, palpitations, orthopnea, swelling of feet, syncope  Gastrointestinal: nausea, vomiting, abdominal pain, diarrhea, black stool,  blood in stool, change in bowel habits  Genitourinary: hematuria, dysuria, urgency, frequency  Integument/Breast: rash,  pruritis  Hematologic/Lymphatic: easy bruising, lymphadenopathy  Musculoskeletal: arthralgias , myalgias, back pain, neck pain, knee pain  Neurological: confusion, seizures, tremors,  slurred speech  Behavioral/Psych:  depression, anxiety, auditory or visual hallucinations     OBJECTIVE:     Physical Exam:  Body mass index is 20.75 kg/m².    Constitutional: Appears well-developed and well-nourished. thin built   Head: Normocephalic and atraumatic.   Neck: Normal range of motion. Neck supple. trach collar  Cardiovascular: Normal heart rate.  Regular heart rhythm.  Pulmonary/Chest: Effort normal.   Abdominal: No distension.  No tenderness, PEG tube insitu   Musculoskeletal: Normal range of motion. No edema.   Neurological: Alert and oriented to person, place, and time.   Skin: Skin is warm and dry.   Psychiatric: Normal mood and affect. Behavior is normal.                  Vital Signs  Temp: 98.1 °F (36.7 °C) (04/22/22 1132)  Pulse: 97 (04/22/22 1635)  Resp: 16 (04/22/22 1635)  BP: 138/70 (04/22/22 1132)  SpO2: 96 % (04/22/22 1635)     24 Hour VS Range    Temp:  [98.1 °F (36.7 °C)-98.8 °F (37.1 °C)]   Pulse:  []   Resp:  [14-39]   BP: (102-168)/(66-76)   SpO2:  [85 %-97 %]     Intake/Output Summary (Last 24 hours) at 4/22/2022 1652  Last data filed at 4/22/2022 0944  Gross per 24 hour   Intake 0 ml   Output 650 ml   Net -650 ml         I/O This Shift:  No intake/output data recorded.    Wt Readings from Last 3 Encounters:   04/20/22 61.9 kg (136 lb 7.4 oz)   04/06/22 63 kg (138 lb 14.2 oz)   04/04/22 63 kg (139 lb)       I have personally reviewed the vitals and recorded Intake/Output     Laboratory/Diagnostic Data:    CBC/Anemia Labs: Coags:    Recent Labs   Lab 04/20/22  0531 04/21/22  0333 04/22/22  0244   WBC 3.95 4.88 8.02   HGB 9.2* 9.8* 10.3*   HCT 28.6* 29.7* 32.8*    287 317   MCV 90 88 94   RDW 16.9* 16.7* 17.1*    Recent Labs   Lab 04/19/22  0929   APTT <21.0        Chemistries: ABG:   Recent Labs   Lab 04/20/22  0531 04/21/22  0333 04/22/22  0244   * 135* 138   K 4.0 3.4* 3.5   CL 87* 85* 90*   CO2 33* 40* 41*   BUN 13 24* 42*   CREATININE 0.6 0.7 0.7   CALCIUM 8.8  9.1 9.3   PROT 5.4* 6.2 6.2   BILITOT 0.5 0.2 0.3   ALKPHOS 81 76 82   ALT 15 15 21   AST 20 16 22   MG 1.6 2.2 2.1   PHOS 3.8 4.4 2.9    Recent Labs   Lab 04/19/22  0933 04/19/22  1107 04/19/22  1457 04/19/22  1808   PH 7.232* 7.263* 7.383 7.474*   PCO2 101.5* 87.3* 56.5* 56.7*   PO2 16* 25* 26* 20*   HCO3 42.7* 39.5* 33.6*  --    POCSATURATED 14* 34* 46* 34*   BE 15 12 9 18        POCT Glucose: HbA1c:    No results for input(s): POCTGLUCOSE in the last 168 hours. No results found for: HGBA1C     Cardiac Enzymes: Ejection Fractions:    No results for input(s): CPK, CPKMB, MB, TROPONINI in the last 72 hours. EF   Date Value Ref Range Status   04/19/2022 60 % Final   12/30/2021 65 % Final          No results for input(s): COLORU, APPEARANCEUA, PHUR, SPECGRAV, PROTEINUA, GLUCUA, KETONESU, BILIRUBINUA, OCCULTUA, NITRITE, UROBILINOGEN, LEUKOCYTESUR, RBCUA, WBCUA, BACTERIA, SQUAMEPITHEL, HYALINECASTS in the last 48 hours.    Invalid input(s): WRIGHTSUR    Procalcitonin (ng/mL)   Date Value   04/19/2022 0.09     Lactate (Lactic Acid) (mmol/L)   Date Value   04/19/2022 1.0     BNP (pg/mL)   Date Value   04/19/2022 1,607 (H)   03/21/2022 242 (H)   11/02/2019 260 (H)   12/27/2018 239 (H)     No results found for: CRP, SEDRATE  No results found for: DDIMER  Ferritin (ng/mL)   Date Value   02/16/2022 135     No results found for: LDH  Troponin I (ng/mL)   Date Value   04/19/2022 0.018   11/02/2019 0.016   12/27/2018 <0.006     No results found for this or any previous visit.  SARS-CoV2 (COVID-19) Qualitative PCR (no units)   Date Value   12/31/2021 Not Detected     SARS-CoV-2 RNA, Amplification, Qual (no units)   Date Value   01/13/2022 Negative   01/03/2022 Negative     POC Rapid COVID (no units)   Date Value   04/19/2022 Negative       Microbiology labs for the last week  Microbiology Results (last 7 days)     Procedure Component Value Units Date/Time    Blood culture x two cultures. Draw prior to antibiotics. [[id number]]  Collected: 04/19/22 0930    Order Status: Completed Specimen: Blood from Peripheral, Antecubital, Left Updated: 04/22/22 1012     Blood Culture, Routine No Growth to date      No Growth to date      No Growth to date      No Growth to date    Narrative:      Aerobic and anaerobic    Blood culture x two cultures. Draw prior to antibiotics. [[id number]] Collected: 04/19/22 0925    Order Status: Completed Specimen: Blood from Peripheral, Antecubital, Right Updated: 04/22/22 1012     Blood Culture, Routine No Growth to date      No Growth to date      No Growth to date      No Growth to date    Narrative:      Aerobic and anaerobic    Culture, Respiratory [[id number]]  (Abnormal)  (Susceptibility) Collected: 04/19/22 1119    Order Status: Completed Specimen: Respiratory from Sputum Updated: 04/22/22 0943     Respiratory Culture PSEUDOMONAS AERUGINOSA  Rare  Normal respiratory ishmael also present       Gram Stain (Respiratory) <10 epithelial cells per low power field.     Gram Stain (Respiratory) Rare WBC's     Gram Stain (Respiratory) Rare Gram positive cocci     Gram Stain (Respiratory) Rare Gram positive rods    Respiratory Infection Panel (PCR), Nasopharyngeal [[id number]] Collected: 04/19/22 1434    Order Status: Canceled Specimen: Nasopharyngeal Swab     Influenza A & B by Molecular [[id number]]     Order Status: Canceled Specimen: Nasopharyngeal Swab           Reviewed and noted in plan where applicable- Please see chart for full lab data.    Lines/Drains:       Peripheral IV - Single Lumen 04/19/22 0920 20 G Right Antecubital (Active)   Site Assessment Clean;Dry;Intact;No redness;No swelling;No drainage;No warmth 04/21/22 0701   Extremity Assessment Distal to IV No abnormal discoloration;No redness;No swelling;No warmth 04/21/22 0701   Line Status Infusing 04/21/22 0701   Dressing Status Clean;Dry;Intact 04/21/22 0701   Dressing Intervention Integrity maintained 04/21/22 0701   Dressing Change Due 04/23/22 04/21/22  0701   Site Change Due 04/23/22 04/21/22 0701   Reason Not Rotated Not due 04/21/22 0701   Number of days: 2            Peripheral IV - Single Lumen 04/19/22 1552 18 G Anterior;Distal;Left Forearm (Active)   Site Assessment Dry;Clean;Intact;No redness;No swelling;No warmth;No drainage 04/21/22 0701   Extremity Assessment Distal to IV No abnormal discoloration;No redness;No swelling;No warmth 04/21/22 0701   Line Status Flushed;Saline locked 04/21/22 0701   Dressing Status Clean;Dry;Intact 04/21/22 0701   Dressing Intervention Integrity maintained 04/21/22 0701   Dressing Change Due 04/23/22 04/21/22 0701   Site Change Due 04/23/22 04/21/22 0701   Reason Not Rotated Not due 04/21/22 0701   Number of days: 1            Gastrostomy/Enterostomy 01/04/22 Percutaneous endoscopic gastrostomy (PEG) LUQ (Active)   Securement secured to abdomen 04/21/22 0701   Interventions Prior to Feeding patency checked;residual checked 04/21/22 0701   Feeding Type intermittent;bolus 04/21/22 0701   Clamp Status/Tolerance clamped;no abdominal distention;no abdominal discomfort;no emesis;no nausea;no residual;no restlessness 04/21/22 0701   Feeding Action feeding continued 04/21/22 0305   Dressing dry and intact 04/21/22 0701   Insertion Site no redness;no warmth;no drainage;no tenderness;no swelling 04/21/22 0701   Site Care secured w/ tape 04/21/22 0305   Flush/Irrigation flushed w/;water;no resistance met 04/21/22 0701   Dressing Change Due 04/21/22 04/21/22 0701   Intake (mL) 120 mL 04/21/22 0701   Formula Name Impact Peptide 1.5 04/21/22 0305   Tube Feeding Intake (mL) 500 04/21/22 0701   Residual Amount (ml) 0 ml 04/21/22 0701   Number of days: 107       Imaging  ECG Results          EKG 12-lead (Final result)  Result time 04/19/22 17:42:02    Final result by Interface, Lab In [facility] (04/19/22 17:42:02)             Narrative:    Test Reason : R06.02,    Vent. Rate : 081 BPM     Atrial Rate : 081 BPM     P-R Int : 158 ms          QRS  Dur : 092 ms      QT Int : 366 ms       P-R-T Axes : 061 -70 045 degrees     QTc Int : 425 ms    Normal sinus rhythm  Left axis deviation Now present  Incomplete right bundle branch block  Abnormal ECG  When compared with ECG of 10-MADONNA-2022 10:11,    Confirmed by DIONNE SINGH MD ([id number]) on 4/19/2022 5:41:52 PM    Referred By: [email or web address]   SELF           Confirmed By:DIONNE SINGH MD                            Results for orders placed during the hospital encounter of 04/19/22    Echo    Interpretation Summary  · The left ventricle is normal in size with normal systolic function. The estimated ejection fraction is 60%.  · Normal right ventricular size with normal right ventricular systolic function.  · Normal left ventricular diastolic function.  · The aortic root is mildly dilated.  · Mechanically ventilated; cannot use inferior caval vein diameter to estimate central venous pressure. The IVC is not enlarged and does collapse somewhat with the respiratory cycle, essentially ruling out venous hypertension.      X-Ray Chest AP Portable  Narrative: EXAMINATION:  XR CHEST AP PORTABLE    CLINICAL HISTORY:  hypoxia;    COMPARISON:  Comparison is made to 04/21/2022 at 17:08.    FINDINGS:  Tracheostomy cannula is again noted.  Heart size is within normal limits, with no detrimental change in the appearance of the cardiomediastinal silhouette or pulmonary vascularity since the prior exam referenced above.  Some opacity in the inferior hemithorax on the left side consistent with airspace consolidation/volume loss in the left lower lobe is again appreciated, but the lung zones are essentially stable since the prior exam and demonstrate no new areas of airspace consolidation or volume loss.  No pleural fluid of any substantial volume is seen on either side.  No pneumothorax.  Surgical clips in the soft tissues of the neck to both sides of midline and a tiny focus of soft tissue calcification adjacent to the left greater  tuberosity are incidental observations, as is some calcification in the wall of the aortic arch.  Impression: No significant detrimental interval change in the appearance of the chest since 04/21/2022 at 17:08 is appreciated.    Electronically signed by: Kodi Schuster MD  Date:    04/22/2022  Time:    10:30  X-Ray Chest AP Portable  Narrative: EXAMINATION:  XR CHEST AP PORTABLE    CLINICAL HISTORY:  concern for aspiration;    TECHNIQUE:  Single frontal view of the chest was performed.    COMPARISON:  Chest radiograph 04/19/2022    FINDINGS:  Endotracheal tube with tip in stable position.  Cardiac leads overlie the field of view.    Heart is mildly enlarged in size.  Mediastinal borders are unremarkable.  Atherosclerosis of the visualized aorta.    Right pleural margin is obscured by overlying cardiac leads, with likely mild bilateral pleural effusions, similar to prior imaging.  No pneumothorax.    Improvement in interstitial and airspace opacities when compared to radiograph 04/19/2022 with continued mild prominent hilar pulmonary vasculature and interstitial prominence throughout both lungs, likely representing improvement in pulmonary edema.    Osseous structures demonstrate no evidence for fracture or osseous destructive lesion.    Soft tissues are unremarkable.  Impression: Interval improvement in pulmonary edema when compared to radiograph 04/19/2022.  Mild residual interstitial edema on today's imaging.    Cardiomegaly and small bilateral pleural effusions.    Electronically signed by resident: Cam Rosario  Date:    04/22/2022  Time:    06:15    Electronically signed by: Patricia Stephens  Date:    04/22/2022  Time:    08:05      Labs, Imaging, EKG and Diagnostic results from 4/22/2022 were reviewed.    Medications:  Medication list was reviewed and changes noted under Assessment/Plan.  No current facility-administered medications on file prior to encounter.     Current Outpatient Medications on File Prior to  Encounter   Medication Sig Dispense Refill    amLODIPine (NORVASC) 10 MG tablet Take 10 mg by mouth once daily.      atorvastatin (LIPITOR) 20 MG tablet 1 tablet (20 mg total) by Per G Tube route once daily. 90 tablet 3    glycopyrrolate (CUVPOSA) 1 mg/5 mL (0.2 mg/mL) Soln Take 5 mLs (1 mg total) by mouth 3 (three) times daily as needed (TO REDUCE SECRETIONS). 473 mL 1    acetaminophen (TYLENOL) 325 MG tablet 2 tablets (650 mg total) by Per G Tube route every 6 (six) hours.  0    albuterol-ipratropium (DUO-NEB) 2.5 mg-0.5 mg/3 mL nebulizer solution Take 3 mLs by nebulization every 4 (four) hours as needed for Wheezing. Rescue 75 mL 0    aspirin 81 MG Chew 1 tablet (81 mg total) by Per G Tube route once daily.  0    bisacodyL (DULCOLAX) 10 mg Supp Place 1 suppository (10 mg total) rectally daily as needed.  0    clopidogreL (PLAVIX) 75 mg tablet 1 tablet (75 mg total) by Per G Tube route once daily. 30 tablet 11    duke's soln (benadryl 30 mL, mylanta 30 mL, LIDOcaine 30 mL, nystatin 30 mL) 120mL Take 10 mLs by mouth 4 (four) times daily as needed (mucositis). 360 mL 0    enoxaparin (LOVENOX) 40 mg/0.4 mL Syrg Inject 0.4 mLs (40 mg total) into the skin once daily.      folic acid (FOLVITE) 1 MG tablet 1 tablet (1 mg total) by Per G Tube route once daily.  0    guaiFENesin 200 mg/5 mL Liqd Take 400 mg by mouth every 4 (four) hours as needed (for secretions). 473 mL 3    losartan (COZAAR) 50 MG tablet 1 tablet (50 mg total) by Per G Tube route once daily. 90 tablet 3    melatonin (MELATIN) 3 mg tablet 2 tablets (6 mg total) by Per G Tube route nightly.  0    metoprolol tartrate (LOPRESSOR) 100 MG tablet 1 tablet (100 mg total) by Per G Tube route 2 (two) times daily. 60 tablet 11    ondansetron (ZOFRAN-ODT) 8 MG TbDL Take 1 tablet (8 mg total) by mouth every 8 (eight) hours as needed (nausea). 30 tablet 1    oxyCODONE (ROXICODONE) 5 mg/5 mL Soln 5 mLs (5 mg total) by Per G Tube route every 4 (four)  hours as needed (Pain). 150 mL 0    polyethylene glycol (GLYCOLAX) 17 gram PwPk 17 g by Per G Tube route 2 (two) times daily.  0    senna-docusate 8.6-50 mg (PERICOLACE) 8.6-50 mg per tablet 1 tablet by Per G Tube route 2 (two) times daily.      sodium chloride (OCEAN) 0.65 % nasal spray 1 spray by Nasal route as needed for Congestion.  12    sodium chloride 3% 3 % nebulizer solution Take 4 mLs by nebulization every 8 (eight) hours.      thiamine 100 MG tablet 1 tablet (100 mg total) by Per G Tube route once daily.       Scheduled Medications:  albuterol-ipratropium, 3 mL, Nebulization, Q4H  aspirin, 81 mg, Per G Tube, Daily  atorvastatin, 20 mg, Per G Tube, Daily  clopidogreL, 75 mg, Per G Tube, Daily  enoxaparin, 40 mg, Subcutaneous, Daily  folic acid, 1 mg, Per G Tube, Daily  glycopyrrolate, 1 mg, Per G Tube, TID  melatonin, 6 mg, Per G Tube, Nightly  methylPREDNISolone sodium succinate injection, 40 mg, Intravenous, Daily  piperacillin-tazobactam (ZOSYN) IVPB, 4.5 g, Intravenous, Q8H  polyethylene glycol, 17 g, Per G Tube, BID  potassium bicarbonate, 25 mEq, Per G Tube, Once  sodium chloride 3%, 4 mL, Nebulization, Q8H  thiamine, 100 mg, Per G Tube, Daily      PRN: sodium chloride, albuterol-ipratropium, bisacodyL, duke's soln (benadryl 30 mL, mylanta 30 mL, LIDOcaine 30 mL, nystatin 30 mL) 120 mL, ondansetron, oxyCODONE, sodium chloride, sodium chloride 0.9%  Infusions:   Estimated Creatinine Clearance: 82.3 mL/min (based on SCr of 0.7 mg/dL).    Assessment/Plan:      * Acute on chronic respiratory failure    Pt with SCC of the tongue s/p total glossectomy, chemowith cisplatin, and nearing completion of radiation with trach, COPD, asthma, HTN, CAD s/p PCI presenting for acute on chronic hypoxemic respiratory failure. He is on 5L with trach collar at home. He has had increased yellow secretions, work of breathing and SOB for the past several days. BNP elevated to 1600 on admission with pulmonary edema and  new cardiomegaly on CXR. Trop wnl. Bicarb is elevated suggesting chronic hypercapnia. Suctioning has cleared thick mucous. He was able to be weaned down to his home O2, however after ~ 20 minutes he desats again, requiring deep suction with resolution of hypoxia. Now stable on home O2. Diuresed -3L        -Pt stable on home O2 for 24 hours, ready to stepdown to floor  - CAP coverage with ceftriaxone/azithro  - Methylprednisolone 40 mg x5 days for possible element of COPD exacerbation  - Duonebs q4  - Hypertonic saline nebs  - Chest PT  - Suction PRN    4/21 Transfer to [facility] [place] [facility] . 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  initially requiring vent but has now been stable on home trach collar for 24 hours. sats 93% on 8L via trach collar.  Awaiting cultures from sputum, on CAP coverage with ceftriaxone. completed Azithromycin. continue solumedrol 40mg IV daily.   4/22 sats 95% on trach collar 10/ fio2 60% . completed azithromycin.   started on Zosyn for possible aspiration  repeat CX ray-in the inferior hemithorax on the left side consistent with airspace consolidation/volume loss in the left lower lobe is again appreciated, but the lung zones are essentially stable since the prior exam and demonstrate no new areas of airspace consolidation or volume loss.  respiratory culture 4/19 with pseudomonas. BNP    Aspiration pneumonia    4/22 sats 95% on trach collar 10/ fio2 60% . completed azithromycin.   started on Zosyn for possible aspiration  repeat CX ray-in the inferior hemithorax on the left side consistent with airspace consolidation/volume loss in the left lower lobe is again appreciated, but the lung zones are essentially stable since the prior exam and demonstrate no new areas of airspace consolidation or volume loss.  respiratory culture 4/19 with pseudomonas.            Dysphagia  Nutrition consulted. Most recent weight and BMI monitored-          Malnutrition (Moderate to Severe)  Weight Loss (Malnutrition): greater than 10% in 6 months                 Measurements:  Wt Readings from Last 1 Encounters:   04/20/22 61.9 kg (136 lb 7.4 oz)   Body mass index is 20.75 kg/m².    Recommendations: Recommendation/Intervention: 1.  Goals: Meet % EEN, EPN by RD f/u date    Patient has been screened and assessed by RD. RD will follow patient.    4/21 secondary to above. on G tube feedings   4/22 changed to bolus GT feedings.       Hypokalemia  replaced       Chronic respiratory failure with hypoxia, on home O2 therapy  secondary to COPD. on 5L oxygen  at home    Protein-calorie malnutrition  Nutrition consulted. Most recent weight and BMI monitored-          Malnutrition (Moderate to Severe)  Weight Loss (Malnutrition): greater than 10% in 6 months              Measurements:  Wt Readings from Last 1 Encounters:   04/20/22 61.9 kg (136 lb 7.4 oz)   Body mass index is 20.75 kg/m².    Recommendations: Recommendation/Intervention: 1.  Goals: Meet % EEN, EPN by RD f/u date    Patient has been screened and assessed by RD. RD will follow patient.      Normocytic anemia    Patient's with Normocytic anemia.. Hemoglobin stable. Etiology likely due to chronic disease .  Current CBC reviewed-  Recent Labs   Lab 04/19/22  1442 04/20/22  0531 04/21/22  0333   HGB 6.5* 9.2* 9.8*     Monitor CBC and transfuse if H/H drops below 7/21.       Other hyperlipidemia  Continue home statin         Primary hypertension    Chronic, controlled.  Latest blood pressure and vitals reviewed-   Temp:  [96.9 °F (36.1 °C)-99.2 °F (37.3 °C)]   Pulse:  []   Resp:  [18-61]   BP: ()/(62-95)   SpO2:  [91 %-99 %] .   Home meds for hypertension were reviewed- amlodipine, losartan and metoprolol  held for now  PRN meds if BP> 180/110 mm HG      Coronary artery disease involving native coronary artery  of native heart without angina pectoris    Continue ASA, plavix, and statin       Squamous cell cancer of tongue    12/15/21 FNA left neck mass : squamous cell carcinoma, p16 negative  1/4/22 total glossectomy, bilateral neck dissection, bilateral cervical facial advancement flaps and anterolateral thigh free flap reconstruction of his glossectomy defect.  Final path :  [id number] (+microscopic SALINAS, single contralateral node), superior soft tissue margin of left neck with tumor.  2/28/22 start chemoradiation  Finished chemo with cisplatin 4/6. Was going to complete final dose of radiation tomorrow.     -Rad/onc consulted for radiation while inpatient  4/22  s/p radiation oncology eval -on adjuvant chemoradiation, completed 31/33 fractions of RT.  renitiation of RT  inpatient when patient able to tolerate.         VTE Risk Mitigation (From admission, onward)         Ordered     enoxaparin injection 40 mg  Daily         04/19/22 1123     IP VTE HIGH RISK PATIENT  Once         04/19/22 1123     Place sequential compression device  Until discontinued         04/19/22 1123                Discharge Planning   NISA: 4/26/2022     Code Status: Full Code   Is the patient medically ready for discharge?: No    Reason for patient still in hospital (select all that apply): Treatment  Discharge Plan A: Home Health                  Jordan Armenta MD  Department of Hospital Medicine   [facility] - Cardiac Medical ICU

## 2022-04-22 NOTE — PHYSICIAN QUERY
PT Name: Fareed Richard Jr.  MR #: [id number]    DOCUMENTATION CLARIFICATION     CDS: Alex Puentes RN CCDS               Contact information: [email or web address]     This form is a permanent document in the medical record.     Query Date: 2022    By submitting this query, we are merely seeking further clarification of documentation.. Please utilize your independent clinical judgment when addressing the question(s) below.    The medical record contains the following:   Indicators  Supporting Clinical Findings Location in Medical Record     x % of Estimated Energy Intake over a time frame from p.o., TF, or TPN NPO, on trach collar this AM during visit. Spoke w/ family member at bedside, who reports TF regimen at home is 6 cans/day of Isosource 1.5. Noted Impact bolus ordered.  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 Modify current TF regimen to pt's home TF regimen of Isosource 1.5 - 6 cans/day = 2250 kcals, 102 g of protein, 1146 mL fluid.  2. RD to monitor & follow-up. 2022 RD consult     x Other Nutrition Problem:  Severe Protein-Calorie Malnutrition  Malnutrition in the context of Chronic Illness/Injury 2022 RD consult      AND / ASPEN Clinical Characteristics (October 2011)  A minimum of two characteristics is recommended for diagnosing either moderate or severe malnutrition   Mild Malnutrition Moderate Malnutrition Severe Malnutrition   Energy Intake from p.o., TF or TPN. < 75% intake of estimated energy needs for less than 7 days < 75% intake of estimated energy needs for greater than 7 days < 50% intake of estimated energy needs for > 5 days   Weight Loss 1-2% in 1 month  5% in 3 months  7.5% in 6 months  10% in 1 year 1-2 % in 1 week  5% in 1 month  7.5% in 3 months  10% in 6 months  20% in 1 year > 2% in 1 week  > 5% in 1 month  > 7.5% in 3 months  > 10% in 6 months  > 20% in 1 year   Physical Findings     None *Mild subcutaneous fat and/or muscle loss  *Mild fluid accumulation  *Stage II decubitus  *Surgical wound or non-healing wound *Mod/severe subcutaneous fat and/or muscle loss  *Mod/severe fluid accumulation  *Stage III or IV decubitus  *Non-healing surgical wound     Provider, please specify diagnosis or diagnoses associated with above clinical findings.    [   x] Severe Protein-Calorie Malnutrition   [   ] Protein-Calorie Malnutrition, Other degree  (please specify): _______   [   ] Other Nutritional Diagnosis (please specify): _______   [   ] Malnutrition ruled out   [  ] Clinically Undetermined     Please document in your progress notes daily for the duration of treatment until resolved and include in your discharge summary.

## 2022-04-23 LAB
ALBUMIN SERPL BCP-MCNC: 2.8 G/DL (ref 3.5–5.2)
ALP SERPL-CCNC: 82 U/L (ref 55–135)
ALT SERPL W/O P-5'-P-CCNC: 18 U/L (ref 10–44)
ANION GAP SERPL CALC-SCNC: 13 MMOL/L (ref 8–16)
AST SERPL-CCNC: 15 U/L (ref 10–40)
BACTERIA SPEC AEROBE CULT: ABNORMAL
BACTERIA SPEC AEROBE CULT: ABNORMAL
BASOPHILS # BLD AUTO: 0.01 K/UL (ref 0–0.2)
BASOPHILS NFR BLD: 0.2 % (ref 0–1.9)
BILIRUB SERPL-MCNC: 0.3 MG/DL (ref 0.1–1)
BUN SERPL-MCNC: 35 MG/DL (ref 8–23)
CALCIUM SERPL-MCNC: 9.4 MG/DL (ref 8.7–10.5)
CHLORIDE SERPL-SCNC: 93 MMOL/L (ref 95–110)
CO2 SERPL-SCNC: 37 MMOL/L (ref 23–29)
CREAT SERPL-MCNC: 0.8 MG/DL (ref 0.5–1.4)
DIFFERENTIAL METHOD: ABNORMAL
EOSINOPHIL # BLD AUTO: 0 K/UL (ref 0–0.5)
EOSINOPHIL NFR BLD: 0 % (ref 0–8)
ERYTHROCYTE [DISTWIDTH] IN BLOOD BY AUTOMATED COUNT: 17 % (ref 11.5–14.5)
EST. GFR  (AFRICAN AMERICAN): >60 ML/MIN/1.73 M^2
EST. GFR  (NON AFRICAN AMERICAN): >60 ML/MIN/1.73 M^2
GLUCOSE SERPL-MCNC: 114 MG/DL (ref 70–110)
GRAM STN SPEC: ABNORMAL
HCT VFR BLD AUTO: 34.1 % (ref 40–54)
HGB BLD-MCNC: 10.5 G/DL (ref 14–18)
IMM GRANULOCYTES # BLD AUTO: 0.04 K/UL (ref 0–0.04)
IMM GRANULOCYTES NFR BLD AUTO: 0.8 % (ref 0–0.5)
LYMPHOCYTES # BLD AUTO: 0.4 K/UL (ref 1–4.8)
LYMPHOCYTES NFR BLD: 8.6 % (ref 18–48)
MAGNESIUM SERPL-MCNC: 2.3 MG/DL (ref 1.6–2.6)
MCH RBC QN AUTO: 29 PG (ref 27–31)
MCHC RBC AUTO-ENTMCNC: 30.8 G/DL (ref 32–36)
MCV RBC AUTO: 94 FL (ref 82–98)
MONOCYTES # BLD AUTO: 0.8 K/UL (ref 0.3–1)
MONOCYTES NFR BLD: 15.6 % (ref 4–15)
NEUTROPHILS # BLD AUTO: 3.7 K/UL (ref 1.8–7.7)
NEUTROPHILS NFR BLD: 74.8 % (ref 38–73)
NRBC BLD-RTO: 0 /100 WBC
PHOSPHATE SERPL-MCNC: 4.2 MG/DL (ref 2.7–4.5)
PLATELET # BLD AUTO: 305 K/UL (ref 150–450)
PMV BLD AUTO: 8.7 FL (ref 9.2–12.9)
POTASSIUM SERPL-SCNC: 4.3 MMOL/L (ref 3.5–5.1)
PROT SERPL-MCNC: 5.7 G/DL (ref 6–8.4)
RBC # BLD AUTO: 3.62 M/UL (ref 4.6–6.2)
SODIUM SERPL-SCNC: 143 MMOL/L (ref 136–145)
WBC # BLD AUTO: 5 K/UL (ref 3.9–12.7)

## 2022-04-23 PROCEDURE — 25000242 PHARM REV CODE 250 ALT 637 W/ HCPCS: Performed by: INTERNAL MEDICINE

## 2022-04-23 PROCEDURE — 99900026 HC AIRWAY MAINTENANCE (STAT)

## 2022-04-23 PROCEDURE — 94640 AIRWAY INHALATION TREATMENT: CPT

## 2022-04-23 PROCEDURE — 63600175 PHARM REV CODE 636 W HCPCS: Performed by: EMERGENCY MEDICINE

## 2022-04-23 PROCEDURE — 94668 MNPJ CHEST WALL SBSQ: CPT

## 2022-04-23 PROCEDURE — 63600175 PHARM REV CODE 636 W HCPCS: Performed by: INTERNAL MEDICINE

## 2022-04-23 PROCEDURE — 31502 CHANGE OF WINDPIPE AIRWAY: CPT

## 2022-04-23 PROCEDURE — 83735 ASSAY OF MAGNESIUM: CPT

## 2022-04-23 PROCEDURE — 27000221 HC OXYGEN, UP TO 24 HOURS

## 2022-04-23 PROCEDURE — 99900022

## 2022-04-23 PROCEDURE — 94761 N-INVAS EAR/PLS OXIMETRY MLT: CPT

## 2022-04-23 PROCEDURE — 99233 PR SUBSEQUENT HOSPITAL CARE,LEVL III: ICD-10-PCS | Mod: ,,, | Performed by: HOSPITALIST

## 2022-04-23 PROCEDURE — 84100 ASSAY OF PHOSPHORUS: CPT

## 2022-04-23 PROCEDURE — 20600001 HC STEP DOWN PRIVATE ROOM

## 2022-04-23 PROCEDURE — 97530 THERAPEUTIC ACTIVITIES: CPT

## 2022-04-23 PROCEDURE — 99900035 HC TECH TIME PER 15 MIN (STAT)

## 2022-04-23 PROCEDURE — 25000003 PHARM REV CODE 250: Performed by: HOSPITALIST

## 2022-04-23 PROCEDURE — 25000003 PHARM REV CODE 250: Performed by: STUDENT IN AN ORGANIZED HEALTH CARE EDUCATION/TRAINING PROGRAM

## 2022-04-23 PROCEDURE — 25000242 PHARM REV CODE 250 ALT 637 W/ HCPCS: Performed by: STUDENT IN AN ORGANIZED HEALTH CARE EDUCATION/TRAINING PROGRAM

## 2022-04-23 PROCEDURE — 99233 SBSQ HOSP IP/OBS HIGH 50: CPT | Mod: ,,, | Performed by: HOSPITALIST

## 2022-04-23 PROCEDURE — 36415 COLL VENOUS BLD VENIPUNCTURE: CPT

## 2022-04-23 PROCEDURE — 97163 PT EVAL HIGH COMPLEX 45 MIN: CPT

## 2022-04-23 PROCEDURE — 63600175 PHARM REV CODE 636 W HCPCS: Performed by: HOSPITALIST

## 2022-04-23 PROCEDURE — 80053 COMPREHEN METABOLIC PANEL: CPT

## 2022-04-23 PROCEDURE — 85025 COMPLETE CBC W/AUTO DIFF WBC: CPT | Performed by: STUDENT IN AN ORGANIZED HEALTH CARE EDUCATION/TRAINING PROGRAM

## 2022-04-23 RX ADMIN — ATORVASTATIN CALCIUM 20 MG: 20 TABLET, FILM COATED ORAL at 08:04

## 2022-04-23 RX ADMIN — IPRATROPIUM BROMIDE AND ALBUTEROL SULFATE 3 ML: 2.5; .5 SOLUTION RESPIRATORY (INHALATION) at 04:04

## 2022-04-23 RX ADMIN — FOLIC ACID 1 MG: 1 TABLET ORAL at 08:04

## 2022-04-23 RX ADMIN — PIPERACILLIN SODIUM AND TAZOBACTAM SODIUM 4.5 G: 4; .5 INJECTION, POWDER, FOR SOLUTION INTRAVENOUS at 08:04

## 2022-04-23 RX ADMIN — PIPERACILLIN SODIUM AND TAZOBACTAM SODIUM 4.5 G: 4; .5 INJECTION, POWDER, FOR SOLUTION INTRAVENOUS at 12:04

## 2022-04-23 RX ADMIN — THIAMINE HCL TAB 100 MG 100 MG: 100 TAB at 08:04

## 2022-04-23 RX ADMIN — IPRATROPIUM BROMIDE AND ALBUTEROL SULFATE 3 ML: 2.5; .5 SOLUTION RESPIRATORY (INHALATION) at 07:04

## 2022-04-23 RX ADMIN — GLYCOPYRROLATE 1 MG: 1 LIQUID ORAL at 04:04

## 2022-04-23 RX ADMIN — IPRATROPIUM BROMIDE AND ALBUTEROL SULFATE 3 ML: 2.5; .5 SOLUTION RESPIRATORY (INHALATION) at 12:04

## 2022-04-23 RX ADMIN — SODIUM CHLORIDE SOLN NEBU 3% 4 ML: 3 NEBU SOLN at 09:04

## 2022-04-23 RX ADMIN — IPRATROPIUM BROMIDE AND ALBUTEROL SULFATE 3 ML: 2.5; .5 SOLUTION RESPIRATORY (INHALATION) at 03:04

## 2022-04-23 RX ADMIN — IPRATROPIUM BROMIDE AND ALBUTEROL SULFATE 3 ML: 2.5; .5 SOLUTION RESPIRATORY (INHALATION) at 08:04

## 2022-04-23 RX ADMIN — METHYLPREDNISOLONE SODIUM SUCCINATE 40 MG: 40 INJECTION, POWDER, FOR SOLUTION INTRAMUSCULAR; INTRAVENOUS at 08:04

## 2022-04-23 RX ADMIN — GLYCOPYRROLATE 1 MG: 1 LIQUID ORAL at 08:04

## 2022-04-23 RX ADMIN — MELATONIN TAB 3 MG 6 MG: 3 TAB at 08:04

## 2022-04-23 RX ADMIN — CLOPIDOGREL 75 MG: 75 TABLET, FILM COATED ORAL at 08:04

## 2022-04-23 RX ADMIN — PIPERACILLIN SODIUM AND TAZOBACTAM SODIUM 4.5 G: 4; .5 INJECTION, POWDER, FOR SOLUTION INTRAVENOUS at 04:04

## 2022-04-23 RX ADMIN — ASPIRIN 81 MG CHEWABLE TABLET 81 MG: 81 TABLET CHEWABLE at 08:04

## 2022-04-23 RX ADMIN — ENOXAPARIN SODIUM 40 MG: 40 INJECTION SUBCUTANEOUS at 04:04

## 2022-04-23 NOTE — NURSING
OFF UNIT FOR RADIATION WITH IV ANTIBX ON 15L TRACH RONNELL, RN AT BEDSIDE TO TRANSPORT PT TO RADIATION.

## 2022-04-23 NOTE — NURSING
Pt return to unit via stretcher with RN. Placed back on 10L trach collar. Pt denies pain and sob. Will continue to monitor.

## 2022-04-23 NOTE — PLAN OF CARE
Problem: Adult Inpatient Plan of Care  Goal: Plan of Care Review  Outcome: Ongoing, Progressing  Goal: Patient-Specific Goal (Individualized)  Outcome: Ongoing, Progressing  Goal: Optimal Comfort and Wellbeing  Outcome: Ongoing, Progressing  Goal: Readiness for Transition of Care  Outcome: Ongoing, Progressing     Problem: Impaired Wound Healing  Goal: Optimal Wound Healing  Outcome: Ongoing, Progressing     Problem: Skin Injury Risk Increased  Goal: Skin Health and Integrity  Outcome: Ongoing, Progressing     Problem: Skin and Tissue Injury (Artificial Airway)  Goal: Absence of Device-Related Skin or Tissue Injury  Outcome: Ongoing, Progressing     Problem: Fall Injury Risk  Goal: Absence of Fall and Fall-Related Injury  Outcome: Ongoing, Progressing       PT AAOX4, ABLE TO MOUTH AND WRITE, DENIES PAIN AND SOB. ON TRACH COLLAR. ALL QUESTIONS ANSWERS. TURNED EVERY 2 HR. PT ASPIRATION HAPPENED DURING TRACH CARE, MD NOTIFIED. TO HOLD NOON FEEDING. RECHECKED RESIDUALS, NONE NOTED. PT REFUSED 1600 FEEDING. PT WENT TO LAST RADITION THERAPY. WIFE AT BEDSIDE. CALL LIGHT AT REACH. ANTIBX GIVEN.

## 2022-04-23 NOTE — PLAN OF CARE
Patient alert, oriented. POC reviewed with patient, safety/fall reviewed, He verbalized understanding. Patient able to turns self, repositions from side-to-side throughout the shift. Noncompliant with tube feed as per scheduled he refuses multiple schedule feeding. States he will at 0800. IV ABT, med  administers.

## 2022-04-23 NOTE — NURSING
Pt off unit for US BLE via stretcher, w/ transport and RN. Pt on 15L trach collar. Pt denies pain and sob.

## 2022-04-23 NOTE — PROGRESS NOTES
[facility] - Cardiac Medical ICU  [facility] Medicine  Progress Note    Patient Name: Fareed Richard Jr.  MRN: [id number]  Patient Class: IP- Inpatient   Admission Date: 4/19/2022  Length of Stay: 4 days  Attending Physician: Jordan Armenta MD  Primary Care Provider: Kodi Tubbs MD        Subjective:     Principal Problem:Acute on chronic respiratory failure        HPI:  Mr. 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 Per wife he has had several days of increasing lethargy, increased work of breathing and secretions. Yesterday she became particularly concerned when he became slightly less responsive and interactive. Wife also notes increased yellow thick secretions from trach site. Upon EMS arrival he was on his home 5 L by trach collar and saturating 93% with significant wheezing.  He received 1 DuoNeb by them.  He was also suctioned.     Currently patient is alert, following commands.  He is responding to yes no questions.  He denies any chest pain but does endorse shortness of breath and cough.  He denies any abdominal pain or pain elsewhere       ED course:   He was given 500ccs IVF as well as one time doses of vanc/cefepime/azithro. Labs significant for BNP of 1600, WBC 5k, K 5.6, Bicarb 35, VBG 7.26/87/25/40. Trop wnl. CXR with new cardiomegaly and pulmonary edema. Suctioned with return of copious secretions. ICU was consulted for acute hypoxemic respiratory failure and he will be admitted for further management.              Overview/Hospital Course:    Admitted to ICU, started on CAP coverage and solumedrol. Quickly weaned off vent after return of copious secretions on suction. Has been maintaining sats well on home O2. Rad/onc consulted for completion of radiology while inpatient. Pending culture sensitivity.    4/21 Transfer to hospital  medicine . 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  initially requiring vent but has now been stable on home trach collar for 24 hours. sats 93% on 8L via trach collar.  Awaiting cultures from sputum, on CAP coverage with ceftriaxone. completed Azithromycin. continue solumedrol 40mg IV daily.  rad/onc consulted this morning-he was supposed to complete radiation outpt but was admitted, attempting to set up for inpatient  4/22 changed to bolus GT feedings. s/p radiation oncology eval -on adjuvant chemoradiation, completed 31/33 fractions of RT.  renitiation of RT  inpatient when patient able to tolerate. sats 95% on trach collar 10/ fio2 60% . completed azithromycin.  started on Zosyn for possible aspiration  repeat CX ray-in the inferior hemithorax on the left side consistent with airspace consolidation/volume loss in the left lower lobe is again appreciated, but the lung zones are essentially stable since the prior exam and demonstrate no new areas of airspace consolidation or volume loss. respiratory culture 4/19 with pansensitive  pseudomonas.     4/23 intermittently refusing bolus GT feedings.stable on 10L/60% Fio2 . completed radiation sessions. off solumedrol  today. PT recs SNF           Review of Systems:   Pain scale:   Constitutional:  fever,  chills, headache, vision loss, hearing loss, weight loss, Generalized weakness, falls, loss of smell, loss of taste, poor appetite,  sore throat, voice change,immunocompromised state.   Respiratory: cough, shortness of breath.   Cardiovascular: chest pain, dizziness, palpitations, orthopnea, swelling of feet, syncope  Gastrointestinal: nausea, vomiting, abdominal pain, diarrhea, black stool,  blood in stool, change in bowel habits  Genitourinary: hematuria, dysuria, urgency, frequency  Integument/Breast: rash,   pruritis  Hematologic/Lymphatic: easy bruising, lymphadenopathy  Musculoskeletal: arthralgias , myalgias, back pain, neck pain, knee pain  Neurological: confusion, seizures, tremors, slurred speech  Behavioral/Psych:  depression, anxiety, auditory or visual hallucinations     OBJECTIVE:     Physical Exam:  Body mass index is 20.75 kg/m².    Constitutional: Appears well-developed and well-nourished. thin built   Head: Normocephalic and atraumatic.   Neck: Normal range of motion. Neck supple. trach collar  Cardiovascular: Normal heart rate.  Regular heart rhythm.  Pulmonary/Chest: Effort normal.   Abdominal: No distension.  No tenderness, PEG tube insitu   Musculoskeletal: Normal range of motion. No edema.   Neurological: Alert and oriented to person, place, and time.   Skin: Skin is warm and dry.   Psychiatric: Normal mood and affect. Behavior is normal.                  Vital Signs  Temp: 96.8 °F (36 °C) (04/23/22 1603)  Pulse: 99 (04/23/22 1603)  Resp: 20 (04/23/22 1603)  BP: 121/74 (04/23/22 1603)  SpO2: (Abnormal) 90 % (04/23/22 1603)     24 Hour VS Range    Temp:  [96.8 °F (36 °C)-98.4 °F (36.9 °C)]   Pulse:  []   Resp:  [16-24]   BP: (121-168)/(74-88)   SpO2:  [88 %-100 %]     Intake/Output Summary (Last 24 hours) at 4/23/2022 1634  Last data filed at 4/23/2022 1230  Gross per 24 hour   Intake 900 ml   Output 250 ml   Net 650 ml         I/O This Shift:  I/O this shift:  In: 900 [NG/GT:900]  Out: -     Wt Readings from Last 3 Encounters:   04/20/22 61.9 kg (136 lb 7.4 oz)   04/06/22 63 kg (138 lb 14.2 oz)   04/04/22 63 kg (139 lb)       I have personally reviewed the vitals and recorded Intake/Output     Laboratory/Diagnostic Data:    CBC/Anemia Labs: Coags:    Recent Labs   Lab 04/21/22  0333 04/22/22  0244 04/23/22  0312   WBC 4.88 8.02 5.00   HGB 9.8* 10.3* 10.5*   HCT 29.7* 32.8* 34.1*    317 305   MCV 88 94 94   RDW 16.7* 17.1* 17.0*    Recent Labs   Lab 04/19/22  0929   APTT <21.0         Chemistries: ABG:   Recent Labs   Lab 04/21/22  0333 04/22/22  0244 04/23/22  0312   * 138 143   K 3.4* 3.5 4.3   CL 85* 90* 93*   CO2 40* 41* 37*   BUN 24* 42* 35*   CREATININE 0.7 0.7 0.8   CALCIUM 9.1 9.3 9.4   PROT 6.2 6.2 5.7*   BILITOT 0.2 0.3 0.3   ALKPHOS 76 82 82   ALT 15 21 18   AST 16 22 15   MG 2.2 2.1 2.3   PHOS 4.4 2.9 4.2    Recent Labs   Lab 04/19/22  0933 04/19/22  1107 04/19/22  1457 04/19/22  1808   PH 7.232* 7.263* 7.383 7.474*   PCO2 101.5* 87.3* 56.5* 56.7*   PO2 16* 25* 26* 20*   HCO3 42.7* 39.5* 33.6*  --    POCSATURATED 14* 34* 46* 34*   BE 15 12 9 18        POCT Glucose: HbA1c:    No results for input(s): POCTGLUCOSE in the last 168 hours. No results found for: HGBA1C     Cardiac Enzymes: Ejection Fractions:    No results for input(s): CPK, CPKMB, MB, TROPONINI in the last 72 hours. EF   Date Value Ref Range Status   04/19/2022 60 % Final   12/30/2021 65 % Final          No results for input(s): COLORU, APPEARANCEUA, PHUR, SPECGRAV, PROTEINUA, GLUCUA, KETONESU, BILIRUBINUA, OCCULTUA, NITRITE, UROBILINOGEN, LEUKOCYTESUR, RBCUA, WBCUA, BACTERIA, SQUAMEPITHEL, HYALINECASTS in the last 48 hours.    Invalid input(s): WRIGHTSUR    Procalcitonin (ng/mL)   Date Value   04/19/2022 0.09     Lactate (Lactic Acid) (mmol/L)   Date Value   04/19/2022 1.0     BNP (pg/mL)   Date Value   04/22/2022 132 (H)   04/19/2022 1,607 (H)   03/21/2022 242 (H)   11/02/2019 260 (H)   12/27/2018 239 (H)     No results found for: CRP, SEDRATE  No results found for: DDIMER  Ferritin (ng/mL)   Date Value   02/16/2022 135     No results found for: LDH  Troponin I (ng/mL)   Date Value   04/19/2022 0.018   11/02/2019 0.016   12/27/2018 <0.006     No results found for this or any previous visit.  SARS-CoV2 (COVID-19) Qualitative PCR (no units)   Date Value   12/31/2021 Not Detected     SARS-CoV-2 RNA, Amplification, Qual (no units)   Date Value   01/13/2022 Negative   01/03/2022 Negative     POC Rapid COVID (no  units)   Date Value   04/19/2022 Negative       Microbiology labs for the last week  Microbiology Results (last 7 days)     Procedure Component Value Units Date/Time    Culture, Respiratory [[id number]]  (Abnormal)  (Susceptibility) Collected: 04/19/22 1119    Order Status: Completed Specimen: Respiratory from Sputum Updated: 04/23/22 1052     Respiratory Culture No S aureus isolated.      PSEUDOMONAS AERUGINOSA  Rare  Normal respiratory ishmael also present       Gram Stain (Respiratory) <10 epithelial cells per low power field.     Gram Stain (Respiratory) Rare WBC's     Gram Stain (Respiratory) Rare Gram positive cocci     Gram Stain (Respiratory) Rare Gram positive rods    Blood culture x two cultures. Draw prior to antibiotics. [[id number]] Collected: 04/19/22 0930    Order Status: Completed Specimen: Blood from Peripheral, Antecubital, Left Updated: 04/23/22 1012     Blood Culture, Routine No Growth to date      No Growth to date      No Growth to date      No Growth to date      No Growth to date    Narrative:      Aerobic and anaerobic    Blood culture x two cultures. Draw prior to antibiotics. [[id number]] Collected: 04/19/22 0925    Order Status: Completed Specimen: Blood from Peripheral, Antecubital, Right Updated: 04/23/22 1012     Blood Culture, Routine No Growth to date      No Growth to date      No Growth to date      No Growth to date      No Growth to date    Narrative:      Aerobic and anaerobic    Respiratory Infection Panel (PCR), Nasopharyngeal [[id number]] Collected: 04/19/22 1434    Order Status: Canceled Specimen: Nasopharyngeal Swab     Influenza A & B by Molecular [[id number]]     Order Status: Canceled Specimen: Nasopharyngeal Swab           Reviewed and noted in plan where applicable- Please see chart for full lab data.    Lines/Drains:       Peripheral IV - Single Lumen 04/19/22 0920 20 G Right Antecubital (Active)   Site Assessment Clean;Dry;Intact;No redness;No swelling;No drainage;No warmth  04/21/22 0701   Extremity Assessment Distal to IV No abnormal discoloration;No redness;No swelling;No warmth 04/21/22 0701   Line Status Infusing 04/21/22 0701   Dressing Status Clean;Dry;Intact 04/21/22 0701   Dressing Intervention Integrity maintained 04/21/22 0701   Dressing Change Due 04/23/22 04/21/22 0701   Site Change Due 04/23/22 04/21/22 0701   Reason Not Rotated Not due 04/21/22 0701   Number of days: 2            Peripheral IV - Single Lumen 04/19/22 1552 18 G Anterior;Distal;Left Forearm (Active)   Site Assessment Dry;Clean;Intact;No redness;No swelling;No warmth;No drainage 04/21/22 0701   Extremity Assessment Distal to IV No abnormal discoloration;No redness;No swelling;No warmth 04/21/22 0701   Line Status Flushed;Saline locked 04/21/22 0701   Dressing Status Clean;Dry;Intact 04/21/22 0701   Dressing Intervention Integrity maintained 04/21/22 0701   Dressing Change Due 04/23/22 04/21/22 0701   Site Change Due 04/23/22 04/21/22 0701   Reason Not Rotated Not due 04/21/22 0701   Number of days: 1            Gastrostomy/Enterostomy 01/04/22 Percutaneous endoscopic gastrostomy (PEG) LUQ (Active)   Securement secured to abdomen 04/21/22 0701   Interventions Prior to Feeding patency checked;residual checked 04/21/22 0701   Feeding Type intermittent;bolus 04/21/22 0701   Clamp Status/Tolerance clamped;no abdominal distention;no abdominal discomfort;no emesis;no nausea;no residual;no restlessness 04/21/22 0701   Feeding Action feeding continued 04/21/22 0305   Dressing dry and intact 04/21/22 0701   Insertion Site no redness;no warmth;no drainage;no tenderness;no swelling 04/21/22 0701   Site Care secured w/ tape 04/21/22 0305   Flush/Irrigation flushed w/;water;no resistance met 04/21/22 0701   Dressing Change Due 04/21/22 04/21/22 0701   Intake (mL) 120 mL 04/21/22 0701   Formula Name Impact Peptide 1.5 04/21/22 0305   Tube Feeding Intake (mL) 500 04/21/22 0701   Residual Amount (ml) 0 ml 04/21/22 0701    Number of days: 107       Imaging  ECG Results          EKG 12-lead (Final result)  Result time 04/19/22 17:42:02    Final result by Interface, [facility] (04/19/22 17:42:02)             Narrative:    Test Reason : R06.02,    Vent. Rate : 081 BPM     Atrial Rate : 081 BPM     P-R Int : 158 ms          QRS Dur : 092 ms      QT Int : 366 ms       P-R-T Axes : 061 -70 045 degrees     QTc Int : 425 ms    Normal sinus rhythm  Left axis deviation Now present  Incomplete right bundle branch block  Abnormal ECG  When compared with ECG of 10-MADONNA-2022 10:11,    Confirmed by DIONNE SINGH MD ([id number]) on 4/19/2022 5:41:52 PM    Referred By: [email or web address]   SELF           Confirmed By:DIONNE SINGH MD                            Results for orders placed during the hospital encounter of 04/19/22    Echo    Interpretation Summary  · The left ventricle is normal in size with normal systolic function. The estimated ejection fraction is 60%.  · Normal right ventricular size with normal right ventricular systolic function.  · Normal left ventricular diastolic function.  · The aortic root is mildly dilated.  · Mechanically ventilated; cannot use inferior caval vein diameter to estimate central venous pressure. The IVC is not enlarged and does collapse somewhat with the respiratory cycle, essentially ruling out venous hypertension.      X-Ray Chest AP Portable  Narrative: EXAMINATION:  XR CHEST AP PORTABLE    CLINICAL HISTORY:  possible aspiration;    TECHNIQUE:  Single frontal view of the chest was performed.    COMPARISON:  04/22/2022    FINDINGS:  Stable tracheostomy.    Again seen, blunting of the costophrenic angles consistent with small bilateral pleural effusions.    The cardiomediastinal silhouette is top limits of normal size to borderline enlarged.  There is mild unfolding and atherosclerotic stigmata of the aorta.    No other significant interval changes are noted.  Impression: Other findings are as above.    Electronically  signed by: Patricia Stephens  Date:    04/22/2022  Time:    17:42  X-Ray Chest AP Portable  Narrative: EXAMINATION:  XR CHEST AP PORTABLE    CLINICAL HISTORY:  hypoxia;    COMPARISON:  Comparison is made to 04/21/2022 at 17:08.    FINDINGS:  Tracheostomy cannula is again noted.  Heart size is within normal limits, with no detrimental change in the appearance of the cardiomediastinal silhouette or pulmonary vascularity since the prior exam referenced above.  Some opacity in the inferior hemithorax on the left side consistent with airspace consolidation/volume loss in the left lower lobe is again appreciated, but the lung zones are essentially stable since the prior exam and demonstrate no new areas of airspace consolidation or volume loss.  No pleural fluid of any substantial volume is seen on either side.  No pneumothorax.  Surgical clips in the soft tissues of the neck to both sides of midline and a tiny focus of soft tissue calcification adjacent to the left greater tuberosity are incidental observations, as is some calcification in the wall of the aortic arch.  Impression: No significant detrimental interval change in the appearance of the chest since 04/21/2022 at 17:08 is appreciated.    Electronically signed by: Kodi Schuster MD  Date:    04/22/2022  Time:    10:30  X-Ray Chest AP Portable  Narrative: EXAMINATION:  XR CHEST AP PORTABLE    CLINICAL HISTORY:  concern for aspiration;    TECHNIQUE:  Single frontal view of the chest was performed.    COMPARISON:  Chest radiograph 04/19/2022    FINDINGS:  Endotracheal tube with tip in stable position.  Cardiac leads overlie the field of view.    Heart is mildly enlarged in size.  Mediastinal borders are unremarkable.  Atherosclerosis of the visualized aorta.    Right pleural margin is obscured by overlying cardiac leads, with likely mild bilateral pleural effusions, similar to prior imaging.  No pneumothorax.    Improvement in interstitial and airspace opacities when  compared to radiograph 04/19/2022 with continued mild prominent hilar pulmonary vasculature and interstitial prominence throughout both lungs, likely representing improvement in pulmonary edema.    Osseous structures demonstrate no evidence for fracture or osseous destructive lesion.    Soft tissues are unremarkable.  Impression: Interval improvement in pulmonary edema when compared to radiograph 04/19/2022.  Mild residual interstitial edema on today's imaging.    Cardiomegaly and small bilateral pleural effusions.    Electronically signed by resident: Cam Rosario  Date:    04/22/2022  Time:    06:15    Electronically signed by: Patricia Stephens  Date:    04/22/2022  Time:    08:05      Labs, Imaging, EKG and Diagnostic results from 4/23/2022 were reviewed.    Medications:  Medication list was reviewed and changes noted under Assessment/Plan.  No current facility-administered medications on file prior to encounter.     Current Outpatient Medications on File Prior to Encounter   Medication Sig Dispense Refill    amLODIPine (NORVASC) 10 MG tablet Take 10 mg by mouth once daily.      atorvastatin (LIPITOR) 20 MG tablet 1 tablet (20 mg total) by Per G Tube route once daily. 90 tablet 3    glycopyrrolate (CUVPOSA) 1 mg/5 mL (0.2 mg/mL) Soln Take 5 mLs (1 mg total) by mouth 3 (three) times daily as needed (TO REDUCE SECRETIONS). 473 mL 1    acetaminophen (TYLENOL) 325 MG tablet 2 tablets (650 mg total) by Per G Tube route every 6 (six) hours.  0    albuterol-ipratropium (DUO-NEB) 2.5 mg-0.5 mg/3 mL nebulizer solution Take 3 mLs by nebulization every 4 (four) hours as needed for Wheezing. Rescue 75 mL 0    aspirin 81 MG Chew 1 tablet (81 mg total) by Per G Tube route once daily.  0    bisacodyL (DULCOLAX) 10 mg Supp Place 1 suppository (10 mg total) rectally daily as needed.  0    clopidogreL (PLAVIX) 75 mg tablet 1 tablet (75 mg total) by Per G Tube route once daily. 30 tablet 11    duke's soln (benadryl 30 mL,  mylanta 30 mL, LIDOcaine 30 mL, nystatin 30 mL) 120mL Take 10 mLs by mouth 4 (four) times daily as needed (mucositis). 360 mL 0    enoxaparin (LOVENOX) 40 mg/0.4 mL Syrg Inject 0.4 mLs (40 mg total) into the skin once daily.      folic acid (FOLVITE) 1 MG tablet 1 tablet (1 mg total) by Per G Tube route once daily.  0    guaiFENesin 200 mg/5 mL Liqd Take 400 mg by mouth every 4 (four) hours as needed (for secretions). 473 mL 3    losartan (COZAAR) 50 MG tablet 1 tablet (50 mg total) by Per G Tube route once daily. 90 tablet 3    melatonin (MELATIN) 3 mg tablet 2 tablets (6 mg total) by Per G Tube route nightly.  0    metoprolol tartrate (LOPRESSOR) 100 MG tablet 1 tablet (100 mg total) by Per G Tube route 2 (two) times daily. 60 tablet 11    ondansetron (ZOFRAN-ODT) 8 MG TbDL Take 1 tablet (8 mg total) by mouth every 8 (eight) hours as needed (nausea). 30 tablet 1    oxyCODONE (ROXICODONE) 5 mg/5 mL Soln 5 mLs (5 mg total) by Per G Tube route every 4 (four) hours as needed (Pain). 150 mL 0    polyethylene glycol (GLYCOLAX) 17 gram PwPk 17 g by Per G Tube route 2 (two) times daily.  0    senna-docusate 8.6-50 mg (PERICOLACE) 8.6-50 mg per tablet 1 tablet by Per G Tube route 2 (two) times daily.      sodium chloride (OCEAN) 0.65 % nasal spray 1 spray by Nasal route as needed for Congestion.  12    sodium chloride 3% 3 % nebulizer solution Take 4 mLs by nebulization every 8 (eight) hours.      thiamine 100 MG tablet 1 tablet (100 mg total) by Per G Tube route once daily.       Scheduled Medications:  albuterol-ipratropium, 3 mL, Nebulization, Q4H  aspirin, 81 mg, Per G Tube, Daily  atorvastatin, 20 mg, Per G Tube, Daily  clopidogreL, 75 mg, Per G Tube, Daily  enoxaparin, 40 mg, Subcutaneous, Daily  folic acid, 1 mg, Per G Tube, Daily  glycopyrrolate, 1 mg, Per G Tube, TID  melatonin, 6 mg, Per G Tube, Nightly  methylPREDNISolone sodium succinate injection, 40 mg, Intravenous, Daily  piperacillin-tazobactam  (ZOSYN) IVPB, 4.5 g, Intravenous, Q8H  polyethylene glycol, 17 g, Per G Tube, BID  sodium chloride 3%, 4 mL, Nebulization, Q8H  thiamine, 100 mg, Per G Tube, Daily      PRN: sodium chloride, albuterol-ipratropium, bisacodyL, duke's soln (benadryl 30 mL, mylanta 30 mL, LIDOcaine 30 mL, nystatin 30 mL) 120 mL, ondansetron, oxyCODONE, sodium chloride, sodium chloride 0.9%  Infusions:   Estimated Creatinine Clearance: 72 mL/min (based on SCr of 0.8 mg/dL).    Assessment/Plan:      * Acute on chronic respiratory failure    Pt with SCC of the tongue s/p total glossectomy, chemowith cisplatin, and nearing completion of radiation with trach, COPD, asthma, HTN, CAD s/p PCI presenting for acute on chronic hypoxemic respiratory failure. He is on 5L with trach collar at home. He has had increased yellow secretions, work of breathing and SOB for the past several days. BNP elevated to 1600 on admission with pulmonary edema and new cardiomegaly on CXR. Trop wnl. Bicarb is elevated suggesting chronic hypercapnia. Suctioning has cleared thick mucous. He was able to be weaned down to his home O2, however after ~ 20 minutes he desats again, requiring deep suction with resolution of hypoxia. Now stable on home O2. Diuresed -3L        -Pt stable on home O2 for 24 hours, ready to stepdown to floor  - CAP coverage with ceftriaxone/azithro  - Methylprednisolone 40 mg x5 days for possible element of COPD exacerbation  - Duonebs q4  - Hypertonic saline nebs  - Chest PT  - Suction PRN    4/21 Transfer to hospital medicine . 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  initially requiring vent but has now been stable on home trach collar for 24 hours. sats 93% on 8L via trach collar.  Awaiting cultures from sputum, on CAP coverage with ceftriaxone. completed Azithromycin. continue solumedrol 40mg IV daily.   4/22 sats 95%  on trach collar 10/ fio2 60% . completed azithromycin.   started on Zosyn for possible aspiration  repeat CX ray-in the inferior hemithorax on the left side consistent with airspace consolidation/volume loss in the left lower lobe is again appreciated, but the lung zones are essentially stable since the prior exam and demonstrate no new areas of airspace consolidation or volume loss.  respiratory culture 4/19 with pansensitive pseudomonas.   4/23 stable on 10L/60% Fio2     Aspiration pneumonia    4/22 sats 95% on trach collar 10/ fio2 60% . completed azithromycin.   started on Zosyn for possible aspiration  repeat CX ray-in the inferior hemithorax on the left side consistent with airspace consolidation/volume loss in the left lower lobe is again appreciated, but the lung zones are essentially stable since the prior exam and demonstrate no new areas of airspace consolidation or volume loss.  respiratory culture 4/19 with pseudomonas.           Dysphagia  Nutrition consulted. Most recent weight and BMI monitored-          Malnutrition (Moderate to Severe)  Weight Loss (Malnutrition): greater than 10% in 6 months                 Measurements:  Wt Readings from Last 1 Encounters:   04/20/22 61.9 kg (136 lb 7.4 oz)   Body mass index is 20.75 kg/m².    Recommendations: Recommendation/Intervention: 1.  Goals: Meet % EEN, EPN by RD f/u date    Patient has been screened and assessed by RD. RD will follow patient.    4/21 secondary to above. on G tube feedings   4/22 changed to bolus GT feedings.   4/23 intermittently refusing bolus GT feedings.      Hypokalemia  replaced       Chronic respiratory failure with hypoxia, on home O2 therapy  secondary to COPD. on 5L oxygen  at home    Protein-calorie malnutrition  Nutrition consulted. Most recent weight and BMI monitored-          Malnutrition (Moderate to Severe)  Weight Loss (Malnutrition): greater than 10% in 6 months              Measurements:  Wt Readings from Last 1  Encounters:   04/20/22 61.9 kg (136 lb 7.4 oz)   Body mass index is 20.75 kg/m².    Recommendations: Recommendation/Intervention: 1.  Goals: Meet % EEN, EPN by RD f/u date    Patient has been screened and assessed by RD. RD will follow patient.      Normocytic anemia    Patient's with Normocytic anemia.. Hemoglobin stable. Etiology likely due to chronic disease .  Current CBC reviewed-  Recent Labs   Lab 04/19/22  1442 04/20/22  0531 04/21/22  0333   HGB 6.5* 9.2* 9.8*     Monitor CBC and transfuse if H/H drops below 7/21.       Other hyperlipidemia  Continue home statin         Primary hypertension    Chronic, controlled.  Latest blood pressure and vitals reviewed-   Temp:  [96.9 °F (36.1 °C)-99.2 °F (37.3 °C)]   Pulse:  []   Resp:  [18-61]   BP: ()/(62-95)   SpO2:  [91 %-99 %] .   Home meds for hypertension were reviewed- amlodipine, losartan and metoprolol  held for now  PRN meds if BP> 180/110 mm HG      Coronary artery disease involving native coronary artery of native heart without angina pectoris    Continue ASA, plavix, and statin       Squamous cell cancer of tongue    12/15/21 FNA left neck mass : squamous cell carcinoma, p16 negative  1/4/22 total glossectomy, bilateral neck dissection, bilateral cervical facial advancement flaps and anterolateral thigh free flap reconstruction of his glossectomy defect.  Final path :  [id number] (+microscopic SALINAS, single contralateral node), superior soft tissue margin of left neck with tumor.  2/28/22 start chemoradiation  Finished chemo with cisplatin 4/6. Was going to complete final dose of radiation tomorrow.     -Rad/onc consulted for radiation while inpatient  4/22  s/p radiation oncology eval -on adjuvant chemoradiation, completed 31/33 fractions of RT.  renitiation of RT  inpatient when patient able to tolerate.         VTE Risk Mitigation (From admission, onward)         Ordered     enoxaparin injection 40 mg  Daily         04/19/22 1123     IP  VTE HIGH RISK PATIENT  Once         04/19/22 1123     Place sequential compression device  Until discontinued         04/19/22 1123                Discharge Planning   NISA: 4/26/2022     Code Status: Full Code   Is the patient medically ready for discharge?: No    Reason for patient still in hospital (select all that apply): Treatment  Discharge Plan A: Home Health                  Jordan Armenta MD  Department of Hospital Medicine   [facility] - Cardiac Medical ICU

## 2022-04-23 NOTE — ASSESSMENT & PLAN NOTE
12/15/21 FNA left neck mass : squamous cell carcinoma, p16 negative  1/4/22 total glossectomy, bilateral neck dissection, bilateral cervical facial advancement flaps and anterolateral thigh free flap reconstruction of his glossectomy defect.  Final path :  [id number] (+microscopic SALINAS, single contralateral node), superior soft tissue margin of left neck with tumor.  2/28/22 start chemoradiation  Finished chemo with cisplatin 4/6. Was going to complete final dose of radiation tomorrow.     -Rad/onc consulted for radiation while inpatient  4/22  s/p radiation oncology eval -on adjuvant chemoradiation, completed 31/33 fractions of RT.  renitiation of RT  inpatient when patient able to tolerate.    4/23  completed radiation sessions  4/28 - weaning oxygen, takes 5 liters at home and has all supplies.

## 2022-04-23 NOTE — CARE UPDATE
"RAPID RESPONSE NURSE AI ALERT       AI alert received.    Chart Reviewed: 04/23/2022, 2:42 PM    MRN: [id number]  Bed: 8096/8094 A    Dx: Acute on chronic respiratory failure    Fareed Richard Jr. has a past medical history of Cancer, COPD (chronic obstructive pulmonary disease), Hyperlipidemia, Hypertension, and Pseudoaneurysm.    Last VS: /80 (BP Location: Left arm, Patient Position: Sitting)   Pulse 104   Temp 96.8 °F (36 °C)   Resp (!) 21   Ht 5' 8" (1.727 m)   Wt 61.9 kg (136 lb 7.4 oz)   SpO2 (!) 90%   BMI 20.75 kg/m²     24H Vital Sign Range:  Temp:  [96.8 °F (36 °C)-98.4 °F (36.9 °C)]   Pulse:  []   Resp:  [16-24]   BP: (122-168)/(72-88)   SpO2:  [88 %-100 %]     Level of Consciousness (AVPU): alert    Recent Labs     04/21/22  0333 04/22/22  0244 04/23/22  0312   WBC 4.88 8.02 5.00   HGB 9.8* 10.3* 10.5*   HCT 29.7* 32.8* 34.1*    317 305       Recent Labs     04/21/22  0333 04/22/22  0244 04/23/22  0312   * 138 143   K 3.4* 3.5 4.3   CL 85* 90* 93*   CO2 40* 41* 37*   CREATININE 0.7 0.7 0.8   * 126* 114*   PHOS 4.4 2.9 4.2   MG 2.2 2.1 2.3        No results for input(s): PH, PCO2, PO2, HCO3, POCSATURATED, BE in the last 72 hours.     OXYGEN:  Flow (L/min): 10  Oxygen Concentration (%): 60  O2 Device (Oxygen Therapy): Trach Collar    MEWS score: 2    Bedside RNKaylee contacted. No concerns verbalized at this time. Instructed to call [phone] for further concerns or assistance.    Betina Winston RN        "

## 2022-04-23 NOTE — PLAN OF CARE
Problem: Physical Therapy  Goal: Physical Therapy Goal  Description: Goals to be met by: 22    Patient will increase functional independence with mobility by performin. Pt will perform supine<>sit with supervision to improve independence with mobility  2. Pt will perform functional transfers with supervision   3. Pt will ambulate >150 ft feet with LRAD and supervision to safely perform household distance ambulation  4. Pt will ascend/descend 5 stairs with supervision to safely manage within home.   Outcome: Ongoing, Progressing     Evaluation completed and goals currently appropriate at this time.    Beatriz Castro, PT, DPT, GCS  2022

## 2022-04-23 NOTE — NURSING
PT RETURN TO UNIT VIA BED WITH ANTIBX AND PLACED BACK ON 10L TRACH COLLAR. NO DISTRESS NOTED. WILL CONTINUE TO MONITOR.

## 2022-04-23 NOTE — NURSING
2000 patient alert, nonverbal able to makes his needs known with written board. POC reviewed, patient refused tube feeding. Explained the nutritional value of the tube feeding formula, and he still refused it. med's given via peg.

## 2022-04-23 NOTE — PLAN OF CARE
Problem: Adult Inpatient Plan of Care  Goal: Plan of Care Review  Outcome: Ongoing, Progressing  Goal: Patient-Specific Goal (Individualized)  Outcome: Ongoing, Progressing  Goal: Optimal Comfort and Wellbeing  Outcome: Ongoing, Progressing     Problem: Communication Impairment (Artificial Airway)  Goal: Effective Communication  Outcome: Ongoing, Progressing     Problem: Fall Injury Risk  Goal: Absence of Fall and Fall-Related Injury  Outcome: Ongoing, Progressing    Pt aaox4, moves all extremities, mouths and writes for communication. Pt denies pain and sob. On 10L trach collar. Voids and had BM. Went to US. TF bolus tolerated. Wife at bedside. All questions answered.

## 2022-04-23 NOTE — PT/OT/SLP EVAL
Physical Therapy Evaluation and Treatment    Patient Name:  Fareed Richard Jr.   MRN:  [id number]    Recommendations:     Discharge Recommendations:  nursing facility, skilled   Discharge Equipment Recommendations: none   Barriers to discharge: Inaccessible home and increased assistance required    Assessment:       Fareed Richard Jr. is a 72 y.o. male admitted with a medical diagnosis of Acute on chronic respiratory failure.  He presents with the following impairments/functional limitations:  weakness, impaired balance, decreased safety awareness, impaired endurance, impaired cardiopulmonary response to activity, impaired functional mobilty, gait instability.  Pt received on commode, requiring total assistance for hygiene care in sitting, standing, and then again in supine. Pt required min to mod assist to complete several sit<>stand transfers and then ultimately commode>bed stand pivot transfer. Pt also with ongoing tachycardia and concern for arrhythmias during all mobility with heart rate increased up to 200 bpm. RN present and assisting throughout. Pt continues to present below their independent prior functional baseline. Pt would benefit from continued, skilled PT while in house to address the above listed impairments, further progress mobility as able, return towards highest level of function.      Rehab Prognosis: Fair; patient would benefit from acute skilled PT services 3 x/week to address these deficits and reach maximum level of function.  Patient is most appropriate to go to nursing facility, skilled .  Recent Surgery: * No surgery found *      Plan:     During this hospitalization, patient to be seen 3 x/week to address the identified rehab impairments via gait training, therapeutic activities, therapeutic exercises, neuromuscular re-education, wheelchair management/training and progress toward the following goals:    · Plan of Care Expires:  05/22/22    Subjective     Subjective: pt appropriately  mouthing words to communicate 2/2 trach   Pt goal: improve breathing  Pain/Comfort:  · Pain Rating 1: 0/10    Patients cultural, spiritual, Protestant conflicts given the current situation: no    Living Environment: Pt lives with spouse in a H with 5 BECKY with bilateral hand rails with tub shower   Prior level of function:  Independent without AD, able to perform ADLs, wife able to assist if needed, not driving  Falls within the last 90 days: denies  Equipment owned: cane, straight, walker, rolling, wheelchair, rollator, shower chair, hospital bed  Support available upon discharge: spouse    Objective:     Communicated with nursing prior to session.  Patient found on commode with peripheral IV, telemetry, pulse ox (continuous), Tracheostomy, oxygen  upon PT entry to room.     General Precautions: Standard, fall   Orthopedic Precautions:N/A   Braces: N/A     Exams:  · Cognition: appropriate and cooperative  · RLE ROM: WFL  · RLE Strength: WFL  · LLE ROM: WFL  · LLE Strength: WFL  · Posture: pt appears frail  · Integumentary: grossly intact  · Sensation: denies numbness/tingling    Functional Mobility:  · Bed Mobility: supervision for sit>supine with HOB elevated ~30 degrees. Pt able to roll bilaterally with supervision.  · Transfers: min assist for sit<>stand x3 from commode via HHA, pt demo's reduced ability for full hip extension throughout standing. Pt then completed stand pivot transfer from commode>bed with mod assist x1  · Gait: Deferred  · Balance:   · Sitting: supervision when seated without posterior back support  · Standing: impaired; min to mod assist for all static and dynamic standing with increased anterior trunk sway and impaired ability for full upright standing    Therapeutic activities and education:  Education provided to pt and spouse re: d/c planning, PT POC, safety in mobility, concerns for immobility. Pt endorses understanding.     Total assistance required for hygiene in seated (both EOB and  on commode), standing on 3 trials, and then again in supine after completing transfer back to bed.     AM-PAC 6 CLICK MOBILITY  Total Score:16     Patient left supine with all lines intact, call button in reach, RN notified and RN  and spouse present.    GOALS:   Multidisciplinary Problems     Physical Therapy Goals        Problem: Physical Therapy    Goal Priority Disciplines Outcome Goal Variances Interventions   Physical Therapy Goal     PT, PT/OT Ongoing, Progressing     Description: Goals to be met by: 22    Patient will increase functional independence with mobility by performin. Pt will perform supine<>sit with supervision to improve independence with mobility  2. Pt will perform functional transfers with supervision   3. Pt will ambulate >150 ft feet with LRAD and supervision to safely perform household distance ambulation  4. Pt will ascend/descend 5 stairs with supervision to safely manage within home.                    History:     Past Medical History:   Diagnosis Date    Cancer     skin to Rt forearm and face    COPD (chronic obstructive pulmonary disease)     Hyperlipidemia     Hypertension     Pseudoaneurysm        Past Surgical History:   Procedure Laterality Date    ABDOMINAL SURGERY      stents placed in liver and large intestines, per patient    CAROTID STENT      CORONARY STENT PLACEMENT  2000    DISSECTION OF NECK Bilateral 2022    Procedure: DISSECTION, NECK;  Surgeon: Naeem Smalls MD;  Location: [address];  Service: ENT;  Laterality: Bilateral;    ESOPHAGOGASTRODUODENOSCOPY W/ PEG N/A 2022    Procedure: EGD, WITH PEG TUBE INSERTION;  Surgeon: Anthony Calabrese MD;  Location: [address];  Service: General;  Laterality: N/A;    EYE SURGERY      Cataract bilateral    femoral stents      bilateral    FLAP PROCEDURE N/A 1/3/2022    Procedure: CREATION, FREE FLAP;  Surgeon: Naeem Smalls MD;  Location: [facility] OR [address] [place] [address];  Service: ENT;   Laterality: N/A;    FLAP PROCEDURE Right 1/4/2022    Procedure: CREATION, FREE FLAP;  Surgeon: Stacey Conde MD;  [facility];  Service: ENT;  Laterality: Right;  Ischemic start 1203  Ischemic stop 1353    GLOSSECTOMY N/A 1/4/2022    Procedure: GLOSSECTOMY;  Surgeon: Naeem Smalls MD;  Location: [facility] OR [address] [place] [address];  Service: ENT;  Laterality: N/A;    RADICAL NECK DISSECTION Left 1/3/2022    Procedure: DISSECTION, NECK, RADICAL;  Surgeon: Naeem Smalls MD;  Location: [facility] OR [address] [place] [address];  Service: ENT;  Laterality: Left;    SKIN CANCER EXCISION      TRACHEOTOMY N/A 1/4/2022    Procedure: TRACHEOTOMY;  Surgeon: Naeem Smalls MD;  Location: [facility] OR [address] [place] [address];  Service: ENT;  Laterality: N/A;       Time Tracking:     PT Received On: 04/23/22  PT Start Time: 1337     PT Stop Time: 1356  PT Total Time (min): 19 min     Billable Minutes: Evaluation 1 unit and Therapeutic Activity 10 min      Beatriz Castro, PT, DPT, GCS  04/23/2022

## 2022-04-24 PROBLEM — E83.39 HYPOPHOSPHATEMIA: Status: ACTIVE | Noted: 2022-04-24

## 2022-04-24 LAB
ALBUMIN SERPL BCP-MCNC: 2.6 G/DL (ref 3.5–5.2)
ALP SERPL-CCNC: 74 U/L (ref 55–135)
ALT SERPL W/O P-5'-P-CCNC: 17 U/L (ref 10–44)
ANION GAP SERPL CALC-SCNC: 9 MMOL/L (ref 8–16)
AST SERPL-CCNC: 15 U/L (ref 10–40)
BACTERIA BLD CULT: NORMAL
BACTERIA BLD CULT: NORMAL
BASOPHILS # BLD AUTO: 0.01 K/UL (ref 0–0.2)
BASOPHILS NFR BLD: 0.1 % (ref 0–1.9)
BILIRUB SERPL-MCNC: 0.2 MG/DL (ref 0.1–1)
BUN SERPL-MCNC: 38 MG/DL (ref 8–23)
CALCIUM SERPL-MCNC: 9.3 MG/DL (ref 8.7–10.5)
CHLORIDE SERPL-SCNC: 98 MMOL/L (ref 95–110)
CO2 SERPL-SCNC: 37 MMOL/L (ref 23–29)
CREAT SERPL-MCNC: 0.7 MG/DL (ref 0.5–1.4)
DIFFERENTIAL METHOD: ABNORMAL
EOSINOPHIL # BLD AUTO: 0 K/UL (ref 0–0.5)
EOSINOPHIL NFR BLD: 0 % (ref 0–8)
ERYTHROCYTE [DISTWIDTH] IN BLOOD BY AUTOMATED COUNT: 16.7 % (ref 11.5–14.5)
EST. GFR  (AFRICAN AMERICAN): >60 ML/MIN/1.73 M^2
EST. GFR  (NON AFRICAN AMERICAN): >60 ML/MIN/1.73 M^2
GLUCOSE SERPL-MCNC: 145 MG/DL (ref 70–110)
HCT VFR BLD AUTO: 33.3 % (ref 40–54)
HGB BLD-MCNC: 9.9 G/DL (ref 14–18)
IMM GRANULOCYTES # BLD AUTO: 0.05 K/UL (ref 0–0.04)
IMM GRANULOCYTES NFR BLD AUTO: 0.7 % (ref 0–0.5)
LYMPHOCYTES # BLD AUTO: 0.5 K/UL (ref 1–4.8)
LYMPHOCYTES NFR BLD: 6.7 % (ref 18–48)
MAGNESIUM SERPL-MCNC: 2.2 MG/DL (ref 1.6–2.6)
MCH RBC QN AUTO: 29 PG (ref 27–31)
MCHC RBC AUTO-ENTMCNC: 29.7 G/DL (ref 32–36)
MCV RBC AUTO: 98 FL (ref 82–98)
MONOCYTES # BLD AUTO: 0.9 K/UL (ref 0.3–1)
MONOCYTES NFR BLD: 12.4 % (ref 4–15)
NEUTROPHILS # BLD AUTO: 6 K/UL (ref 1.8–7.7)
NEUTROPHILS NFR BLD: 80.1 % (ref 38–73)
NRBC BLD-RTO: 0 /100 WBC
PHOSPHATE SERPL-MCNC: 2.6 MG/DL (ref 2.7–4.5)
PLATELET # BLD AUTO: 296 K/UL (ref 150–450)
PMV BLD AUTO: 8.8 FL (ref 9.2–12.9)
POTASSIUM SERPL-SCNC: 3 MMOL/L (ref 3.5–5.1)
PROT SERPL-MCNC: 6.1 G/DL (ref 6–8.4)
RBC # BLD AUTO: 3.41 M/UL (ref 4.6–6.2)
SODIUM SERPL-SCNC: 144 MMOL/L (ref 136–145)
WBC # BLD AUTO: 7.49 K/UL (ref 3.9–12.7)

## 2022-04-24 PROCEDURE — 99232 PR SUBSEQUENT HOSPITAL CARE,LEVL II: ICD-10-PCS | Mod: ,,, | Performed by: HOSPITALIST

## 2022-04-24 PROCEDURE — 25000242 PHARM REV CODE 250 ALT 637 W/ HCPCS: Performed by: HOSPITALIST

## 2022-04-24 PROCEDURE — 94668 MNPJ CHEST WALL SBSQ: CPT

## 2022-04-24 PROCEDURE — 25000003 PHARM REV CODE 250: Performed by: STUDENT IN AN ORGANIZED HEALTH CARE EDUCATION/TRAINING PROGRAM

## 2022-04-24 PROCEDURE — 80053 COMPREHEN METABOLIC PANEL: CPT

## 2022-04-24 PROCEDURE — 94640 AIRWAY INHALATION TREATMENT: CPT

## 2022-04-24 PROCEDURE — 94761 N-INVAS EAR/PLS OXIMETRY MLT: CPT

## 2022-04-24 PROCEDURE — 83735 ASSAY OF MAGNESIUM: CPT

## 2022-04-24 PROCEDURE — 99232 SBSQ HOSP IP/OBS MODERATE 35: CPT | Mod: ,,, | Performed by: HOSPITALIST

## 2022-04-24 PROCEDURE — 20600001 HC STEP DOWN PRIVATE ROOM

## 2022-04-24 PROCEDURE — 84100 ASSAY OF PHOSPHORUS: CPT

## 2022-04-24 PROCEDURE — 85025 COMPLETE CBC W/AUTO DIFF WBC: CPT | Performed by: STUDENT IN AN ORGANIZED HEALTH CARE EDUCATION/TRAINING PROGRAM

## 2022-04-24 PROCEDURE — 63600175 PHARM REV CODE 636 W HCPCS: Performed by: HOSPITALIST

## 2022-04-24 PROCEDURE — 99900026 HC AIRWAY MAINTENANCE (STAT)

## 2022-04-24 PROCEDURE — 99900035 HC TECH TIME PER 15 MIN (STAT)

## 2022-04-24 PROCEDURE — 25000003 PHARM REV CODE 250: Performed by: HOSPITALIST

## 2022-04-24 PROCEDURE — 63600175 PHARM REV CODE 636 W HCPCS: Performed by: INTERNAL MEDICINE

## 2022-04-24 PROCEDURE — 36415 COLL VENOUS BLD VENIPUNCTURE: CPT

## 2022-04-24 PROCEDURE — 27000221 HC OXYGEN, UP TO 24 HOURS

## 2022-04-24 PROCEDURE — 25000242 PHARM REV CODE 250 ALT 637 W/ HCPCS: Performed by: INTERNAL MEDICINE

## 2022-04-24 RX ORDER — SODIUM,POTASSIUM PHOSPHATES 280-250MG
2 POWDER IN PACKET (EA) ORAL ONCE
Status: COMPLETED | OUTPATIENT
Start: 2022-04-24 | End: 2022-04-24

## 2022-04-24 RX ORDER — SODIUM CHLORIDE FOR INHALATION 3 %
4 VIAL, NEBULIZER (ML) INHALATION EVERY 8 HOURS
Status: DISCONTINUED | OUTPATIENT
Start: 2022-04-24 | End: 2022-05-02

## 2022-04-24 RX ADMIN — PIPERACILLIN SODIUM AND TAZOBACTAM SODIUM 4.5 G: 4; .5 INJECTION, POWDER, FOR SOLUTION INTRAVENOUS at 04:04

## 2022-04-24 RX ADMIN — GLYCOPYRROLATE 1 MG: 1 LIQUID ORAL at 04:04

## 2022-04-24 RX ADMIN — POTASSIUM BICARBONATE 40 MEQ: 391 TABLET, EFFERVESCENT ORAL at 11:04

## 2022-04-24 RX ADMIN — IPRATROPIUM BROMIDE AND ALBUTEROL SULFATE 3 ML: 2.5; .5 SOLUTION RESPIRATORY (INHALATION) at 04:04

## 2022-04-24 RX ADMIN — IPRATROPIUM BROMIDE AND ALBUTEROL SULFATE 3 ML: 2.5; .5 SOLUTION RESPIRATORY (INHALATION) at 01:04

## 2022-04-24 RX ADMIN — IPRATROPIUM BROMIDE AND ALBUTEROL SULFATE 3 ML: 2.5; .5 SOLUTION RESPIRATORY (INHALATION) at 10:04

## 2022-04-24 RX ADMIN — GLYCOPYRROLATE 1 MG: 1 LIQUID ORAL at 08:04

## 2022-04-24 RX ADMIN — SODIUM CHLORIDE SOLN NEBU 3% 4 ML: 3 NEBU SOLN at 04:04

## 2022-04-24 RX ADMIN — CLOPIDOGREL 75 MG: 75 TABLET, FILM COATED ORAL at 09:04

## 2022-04-24 RX ADMIN — POTASSIUM & SODIUM PHOSPHATES POWDER PACK 280-160-250 MG 2 PACKET: 280-160-250 PACK at 09:04

## 2022-04-24 RX ADMIN — POTASSIUM BICARBONATE 40 MEQ: 391 TABLET, EFFERVESCENT ORAL at 09:04

## 2022-04-24 RX ADMIN — GLYCOPYRROLATE 1 MG: 1 LIQUID ORAL at 11:04

## 2022-04-24 RX ADMIN — ASPIRIN 81 MG CHEWABLE TABLET 81 MG: 81 TABLET CHEWABLE at 09:04

## 2022-04-24 RX ADMIN — THIAMINE HCL TAB 100 MG 100 MG: 100 TAB at 09:04

## 2022-04-24 RX ADMIN — MELATONIN TAB 3 MG 6 MG: 3 TAB at 08:04

## 2022-04-24 RX ADMIN — IPRATROPIUM BROMIDE AND ALBUTEROL SULFATE 3 ML: 2.5; .5 SOLUTION RESPIRATORY (INHALATION) at 08:04

## 2022-04-24 RX ADMIN — ATORVASTATIN CALCIUM 20 MG: 20 TABLET, FILM COATED ORAL at 09:04

## 2022-04-24 RX ADMIN — ENOXAPARIN SODIUM 40 MG: 40 INJECTION SUBCUTANEOUS at 04:04

## 2022-04-24 RX ADMIN — IPRATROPIUM BROMIDE AND ALBUTEROL SULFATE 3 ML: 2.5; .5 SOLUTION RESPIRATORY (INHALATION) at 12:04

## 2022-04-24 RX ADMIN — FOLIC ACID 1 MG: 1 TABLET ORAL at 09:04

## 2022-04-24 RX ADMIN — PIPERACILLIN SODIUM AND TAZOBACTAM SODIUM 4.5 G: 4; .5 INJECTION, POWDER, FOR SOLUTION INTRAVENOUS at 08:04

## 2022-04-24 RX ADMIN — PIPERACILLIN SODIUM AND TAZOBACTAM SODIUM 4.5 G: 4; .5 INJECTION, POWDER, FOR SOLUTION INTRAVENOUS at 11:04

## 2022-04-24 NOTE — PROGRESS NOTES
[facility] - Cardiac Medical ICU  [facility] Medicine  Progress Note    Patient Name: Fareed Richard Jr.  MRN: [id number]  Patient Class: IP- Inpatient   Admission Date: 4/19/2022  Length of Stay: 5 days  Attending Physician: Jordan Armenta MD  Primary Care Provider: Kodi Tubbs MD        Subjective:     Principal Problem:Acute on chronic respiratory failure        HPI:  Mr. 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 Per wife he has had several days of increasing lethargy, increased work of breathing and secretions. Yesterday she became particularly concerned when he became slightly less responsive and interactive. Wife also notes increased yellow thick secretions from trach site. Upon EMS arrival he was on his home 5 L by trach collar and saturating 93% with significant wheezing.  He received 1 DuoNeb by them.  He was also suctioned.     Currently patient is alert, following commands.  He is responding to yes no questions.  He denies any chest pain but does endorse shortness of breath and cough.  He denies any abdominal pain or pain elsewhere       ED course:   He was given 500ccs IVF as well as one time doses of vanc/cefepime/azithro. Labs significant for BNP of 1600, WBC 5k, K 5.6, Bicarb 35, VBG 7.26/87/25/40. Trop wnl. CXR with new cardiomegaly and pulmonary edema. Suctioned with return of copious secretions. ICU was consulted for acute hypoxemic respiratory failure and he will be admitted for further management.              Overview/Hospital Course:    Admitted to ICU, started on CAP coverage and solumedrol. Quickly weaned off vent after return of copious secretions on suction. Has been maintaining sats well on home O2. Rad/onc consulted for completion of radiology while inpatient. Pending culture sensitivity.    4/21 Transfer to hospital  medicine . 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  initially requiring vent but has now been stable on home trach collar for 24 hours. sats 93% on 8L via trach collar.  Awaiting cultures from sputum, on CAP coverage with ceftriaxone. completed Azithromycin. continue solumedrol 40mg IV daily.  rad/onc consulted this morning-he was supposed to complete radiation outpt but was admitted, attempting to set up for inpatient  4/22 changed to bolus GT feedings. s/p radiation oncology eval -on adjuvant chemoradiation, completed 31/33 fractions of RT.  renitiation of RT  inpatient when patient able to tolerate. sats 95% on trach collar 10/ fio2 60% . completed azithromycin.  started on Zosyn for possible aspiration  repeat CX ray-in the inferior hemithorax on the left side consistent with airspace consolidation/volume loss in the left lower lobe is again appreciated, but the lung zones are essentially stable since the prior exam and demonstrate no new areas of airspace consolidation or volume loss. respiratory culture 4/19 with pansensitive  pseudomonas.     4/23 intermittently refusing bolus GT feedings.stable on 10L/60% Fio2 . completed radiation sessions. off solumedrol  today. PT recs SNF  4/24 K and P replaced            Review of Systems:   Pain scale:   Constitutional:  fever,  chills, headache, vision loss, hearing loss, weight loss, Generalized weakness, falls, loss of smell, loss of taste, poor appetite,  sore throat, voice change,immunocompromised state.   Respiratory: cough, shortness of breath.   Cardiovascular: chest pain, dizziness, palpitations, orthopnea, swelling of feet, syncope  Gastrointestinal: nausea, vomiting, abdominal pain, diarrhea, black stool,  blood in stool, change in bowel habits  Genitourinary: hematuria, dysuria, urgency, frequency  Integument/Breast: rash,   pruritis  Hematologic/Lymphatic: easy bruising, lymphadenopathy  Musculoskeletal: arthralgias , myalgias, back pain, neck pain, knee pain  Neurological: confusion, seizures, tremors, slurred speech  Behavioral/Psych:  depression, anxiety, auditory or visual hallucinations     OBJECTIVE:     Physical Exam:  Body mass index is 20.75 kg/m².    Constitutional: Appears well-developed and well-nourished. thin built   Head: Normocephalic and atraumatic.   Neck: Normal range of motion. Neck supple. trach collar  Cardiovascular: Normal heart rate.  Regular heart rhythm.  Pulmonary/Chest: Effort normal.   Abdominal: No distension.  No tenderness, PEG tube insitu   Musculoskeletal: Normal range of motion. No edema.   Neurological: Alert and oriented to person, place, and time.   Skin: Skin is warm and dry.   Psychiatric: Normal mood and affect. Behavior is normal.                  Vital Signs  Temp: 98.3 °F (36.8 °C) (04/24/22 1540)  Pulse: 105 (04/24/22 1540)  Resp: 18 (04/24/22 1540)  BP: (Abnormal) 152/75 (04/24/22 1540)  SpO2: (Abnormal) 94 % (04/24/22 1445)     24 Hour VS Range    Temp:  [96.8 °F (36 °C)-99.3 °F (37.4 °C)]   Pulse:  []   Resp:  [18-31]   BP: (121-187)/(63-91)   SpO2:  [90 %-100 %]     Intake/Output Summary (Last 24 hours) at 4/24/2022 1556  Last data filed at 4/24/2022 1200  Gross per 24 hour   Intake 1845 ml   Output 1075 ml   Net 770 ml         I/O This Shift:  I/O this shift:  In: 1150 [NG/GT:1150]  Out: 450 [Urine:450]    Wt Readings from Last 3 Encounters:   04/20/22 61.9 kg (136 lb 7.4 oz)   04/06/22 63 kg (138 lb 14.2 oz)   04/04/22 63 kg (139 lb)       I have personally reviewed the vitals and recorded Intake/Output     Laboratory/Diagnostic Data:    CBC/Anemia Labs: Coags:    Recent Labs   Lab 04/22/22  0244 04/23/22  0312 04/24/22  0406   WBC 8.02 5.00 7.49   HGB 10.3* 10.5* 9.9*   HCT 32.8* 34.1* 33.3*    305 296   MCV 94 94 98   RDW 17.1* 17.0* 16.7*    Recent Labs   Lab  04/19/22  0929   APTT <21.0        Chemistries: ABG:   Recent Labs   Lab 04/22/22  0244 04/23/22  0312 04/24/22  0406    143 144   K 3.5 4.3 3.0*   CL 90* 93* 98   CO2 41* 37* 37*   BUN 42* 35* 38*   CREATININE 0.7 0.8 0.7   CALCIUM 9.3 9.4 9.3   PROT 6.2 5.7* 6.1   BILITOT 0.3 0.3 0.2   ALKPHOS 82 82 74   ALT 21 18 17   AST 22 15 15   MG 2.1 2.3 2.2   PHOS 2.9 4.2 2.6*    Recent Labs   Lab 04/19/22  0933 04/19/22  1107 04/19/22  1457 04/19/22  1808   PH 7.232* 7.263* 7.383 7.474*   PCO2 101.5* 87.3* 56.5* 56.7*   PO2 16* 25* 26* 20*   HCO3 42.7* 39.5* 33.6*  --    POCSATURATED 14* 34* 46* 34*   BE 15 12 9 18        POCT Glucose: HbA1c:    No results for input(s): POCTGLUCOSE in the last 168 hours. No results found for: HGBA1C     Cardiac Enzymes: Ejection Fractions:    No results for input(s): CPK, CPKMB, MB, TROPONINI in the last 72 hours. EF   Date Value Ref Range Status   04/19/2022 60 % Final   12/30/2021 65 % Final          No results for input(s): COLORU, APPEARANCEUA, PHUR, SPECGRAV, PROTEINUA, GLUCUA, KETONESU, BILIRUBINUA, OCCULTUA, NITRITE, UROBILINOGEN, LEUKOCYTESUR, RBCUA, WBCUA, BACTERIA, SQUAMEPITHEL, HYALINECASTS in the last 48 hours.    Invalid input(s): WRIGHTSUR    Procalcitonin (ng/mL)   Date Value   04/19/2022 0.09     Lactate (Lactic Acid) (mmol/L)   Date Value   04/19/2022 1.0     BNP (pg/mL)   Date Value   04/22/2022 132 (H)   04/19/2022 1,607 (H)   03/21/2022 242 (H)   11/02/2019 260 (H)   12/27/2018 239 (H)     No results found for: CRP, SEDRATE  No results found for: DDIMER  Ferritin (ng/mL)   Date Value   02/16/2022 135     No results found for: LDH  Troponin I (ng/mL)   Date Value   04/19/2022 0.018   11/02/2019 0.016   12/27/2018 <0.006     No results found for this or any previous visit.  SARS-CoV2 (COVID-19) Qualitative PCR (no units)   Date Value   12/31/2021 Not Detected     SARS-CoV-2 RNA, Amplification, Qual (no units)   Date Value   01/13/2022 Negative   01/03/2022 Negative      POC Rapid COVID (no units)   Date Value   04/19/2022 Negative       Microbiology labs for the last week  Microbiology Results (last 7 days)     Procedure Component Value Units Date/Time    Blood culture x two cultures. Draw prior to antibiotics. [[id number]] Collected: 04/19/22 0930    Order Status: Completed Specimen: Blood from Peripheral, Antecubital, Left Updated: 04/24/22 1012     Blood Culture, Routine No growth after 5 days.    Narrative:      Aerobic and anaerobic    Blood culture x two cultures. Draw prior to antibiotics. [[id number]] Collected: 04/19/22 0925    Order Status: Completed Specimen: Blood from Peripheral, Antecubital, Right Updated: 04/24/22 1012     Blood Culture, Routine No growth after 5 days.    Narrative:      Aerobic and anaerobic    Culture, Respiratory [[id number]]  (Abnormal)  (Susceptibility) Collected: 04/19/22 1119    Order Status: Completed Specimen: Respiratory from Sputum Updated: 04/23/22 1052     Respiratory Culture No S aureus isolated.      PSEUDOMONAS AERUGINOSA  Rare  Normal respiratory ishmael also present       Gram Stain (Respiratory) <10 epithelial cells per low power field.     Gram Stain (Respiratory) Rare WBC's     Gram Stain (Respiratory) Rare Gram positive cocci     Gram Stain (Respiratory) Rare Gram positive rods    Respiratory Infection Panel (PCR), Nasopharyngeal [[id number]] Collected: 04/19/22 1434    Order Status: Canceled Specimen: Nasopharyngeal Swab     Influenza A & B by Molecular [[id number]]     Order Status: Canceled Specimen: Nasopharyngeal Swab           Reviewed and noted in plan where applicable- Please see chart for full lab data.    Lines/Drains:       Peripheral IV - Single Lumen 04/19/22 0920 20 G Right Antecubital (Active)   Site Assessment Clean;Dry;Intact;No redness;No swelling;No drainage;No warmth 04/21/22 0701   Extremity Assessment Distal to IV No abnormal discoloration;No redness;No swelling;No warmth 04/21/22 0701   Line Status Infusing  04/21/22 0701   Dressing Status Clean;Dry;Intact 04/21/22 0701   Dressing Intervention Integrity maintained 04/21/22 0701   Dressing Change Due 04/23/22 04/21/22 0701   Site Change Due 04/23/22 04/21/22 0701   Reason Not Rotated Not due 04/21/22 0701   Number of days: 2            Peripheral IV - Single Lumen 04/19/22 1552 18 G Anterior;Distal;Left Forearm (Active)   Site Assessment Dry;Clean;Intact;No redness;No swelling;No warmth;No drainage 04/21/22 0701   Extremity Assessment Distal to IV No abnormal discoloration;No redness;No swelling;No warmth 04/21/22 0701   Line Status Flushed;Saline locked 04/21/22 0701   Dressing Status Clean;Dry;Intact 04/21/22 0701   Dressing Intervention Integrity maintained 04/21/22 0701   Dressing Change Due 04/23/22 04/21/22 0701   Site Change Due 04/23/22 04/21/22 0701   Reason Not Rotated Not due 04/21/22 0701   Number of days: 1            Gastrostomy/Enterostomy 01/04/22 Percutaneous endoscopic gastrostomy (PEG) LUQ (Active)   Securement secured to abdomen 04/21/22 0701   Interventions Prior to Feeding patency checked;residual checked 04/21/22 0701   Feeding Type intermittent;bolus 04/21/22 0701   Clamp Status/Tolerance clamped;no abdominal distention;no abdominal discomfort;no emesis;no nausea;no residual;no restlessness 04/21/22 0701   Feeding Action feeding continued 04/21/22 0305   Dressing dry and intact 04/21/22 0701   Insertion Site no redness;no warmth;no drainage;no tenderness;no swelling 04/21/22 0701   Site Care secured w/ tape 04/21/22 0305   Flush/Irrigation flushed w/;water;no resistance met 04/21/22 0701   Dressing Change Due 04/21/22 04/21/22 0701   Intake (mL) 120 mL 04/21/22 0701   Formula Name Impact Peptide 1.5 04/21/22 0305   Tube Feeding Intake (mL) 500 04/21/22 0701   Residual Amount (ml) 0 ml 04/21/22 0701   Number of days: 107       Imaging  ECG Results          EKG 12-lead (Final result)  Result time 04/19/22 17:42:02    Final result by Interface, Lab  In [facility] (04/19/22 17:42:02)             Narrative:    Test Reason : R06.02,    Vent. Rate : 081 BPM     Atrial Rate : 081 BPM     P-R Int : 158 ms          QRS Dur : 092 ms      QT Int : 366 ms       P-R-T Axes : 061 -70 045 degrees     QTc Int : 425 ms    Normal sinus rhythm  Left axis deviation Now present  Incomplete right bundle branch block  Abnormal ECG  When compared with ECG of 10-MADONNA-2022 10:11,    Confirmed by DIONNE SINGH MD ([id number]) on 4/19/2022 5:41:52 PM    Referred By: [email or web address]   SELF           Confirmed By:DIONNE SINGH MD                            Results for orders placed during the hospital encounter of 04/19/22    Echo    Interpretation Summary  · The left ventricle is normal in size with normal systolic function. The estimated ejection fraction is 60%.  · Normal right ventricular size with normal right ventricular systolic function.  · Normal left ventricular diastolic function.  · The aortic root is mildly dilated.  · Mechanically ventilated; cannot use inferior caval vein diameter to estimate central venous pressure. The IVC is not enlarged and does collapse somewhat with the respiratory cycle, essentially ruling out venous hypertension.      US Lower Extremity Veins Bilateral  Narrative: EXAMINATION:  US LOWER EXTREMITY VEINS BILATERAL    CLINICAL HISTORY:  R/O DVT;    TECHNIQUE:  Duplex and color flow Doppler and dynamic compression was performed of the bilateral lower extremity veins was performed.    COMPARISON:  None    FINDINGS:  Right thigh veins: The common femoral, superficial femoral, popliteal, upper greater saphenous, and deep femoral veins are patent and free of thrombus. The veins are normally compressible and have normal phasic flow and augmentation response.    Right calf veins: The visualized calf veins are patent.    Left thigh veins: The common femoral, superficial femoral, popliteal, upper greater saphenous, and deep femoral veins are patent and free of thrombus. The  veins are normally compressible and have normal phasic flow and augmentation response.    Left calf veins: The visualized calf veins are patent.  Impression: No evidence of deep venous thrombosis in either lower extremity.    Electronically signed by resident: Anuradha Arthur  Date:    04/23/2022  Time:    17:54    Electronically signed by: Marcos Toth MD  Date:    04/23/2022  Time:    18:01      Labs, Imaging, EKG and Diagnostic results from 4/24/2022 were reviewed.    Medications:  Medication list was reviewed and changes noted under Assessment/Plan.  No current facility-administered medications on file prior to encounter.     Current Outpatient Medications on File Prior to Encounter   Medication Sig Dispense Refill    amLODIPine (NORVASC) 10 MG tablet Take 10 mg by mouth once daily.      atorvastatin (LIPITOR) 20 MG tablet 1 tablet (20 mg total) by Per G Tube route once daily. 90 tablet 3    glycopyrrolate (CUVPOSA) 1 mg/5 mL (0.2 mg/mL) Soln Take 5 mLs (1 mg total) by mouth 3 (three) times daily as needed (TO REDUCE SECRETIONS). 473 mL 1    acetaminophen (TYLENOL) 325 MG tablet 2 tablets (650 mg total) by Per G Tube route every 6 (six) hours.  0    albuterol-ipratropium (DUO-NEB) 2.5 mg-0.5 mg/3 mL nebulizer solution Take 3 mLs by nebulization every 4 (four) hours as needed for Wheezing. Rescue 75 mL 0    aspirin 81 MG Chew 1 tablet (81 mg total) by Per G Tube route once daily.  0    bisacodyL (DULCOLAX) 10 mg Supp Place 1 suppository (10 mg total) rectally daily as needed.  0    clopidogreL (PLAVIX) 75 mg tablet 1 tablet (75 mg total) by Per G Tube route once daily. 30 tablet 11    duke's soln (benadryl 30 mL, mylanta 30 mL, LIDOcaine 30 mL, nystatin 30 mL) 120mL Take 10 mLs by mouth 4 (four) times daily as needed (mucositis). 360 mL 0    enoxaparin (LOVENOX) 40 mg/0.4 mL Syrg Inject 0.4 mLs (40 mg total) into the skin once daily.      folic acid (FOLVITE) 1 MG tablet 1 tablet (1 mg total) by Per G  Tube route once daily.  0    guaiFENesin 200 mg/5 mL Liqd Take 400 mg by mouth every 4 (four) hours as needed (for secretions). 473 mL 3    losartan (COZAAR) 50 MG tablet 1 tablet (50 mg total) by Per G Tube route once daily. 90 tablet 3    melatonin (MELATIN) 3 mg tablet 2 tablets (6 mg total) by Per G Tube route nightly.  0    metoprolol tartrate (LOPRESSOR) 100 MG tablet 1 tablet (100 mg total) by Per G Tube route 2 (two) times daily. 60 tablet 11    ondansetron (ZOFRAN-ODT) 8 MG TbDL Take 1 tablet (8 mg total) by mouth every 8 (eight) hours as needed (nausea). 30 tablet 1    oxyCODONE (ROXICODONE) 5 mg/5 mL Soln 5 mLs (5 mg total) by Per G Tube route every 4 (four) hours as needed (Pain). 150 mL 0    polyethylene glycol (GLYCOLAX) 17 gram PwPk 17 g by Per G Tube route 2 (two) times daily.  0    senna-docusate 8.6-50 mg (PERICOLACE) 8.6-50 mg per tablet 1 tablet by Per G Tube route 2 (two) times daily.      sodium chloride (OCEAN) 0.65 % nasal spray 1 spray by Nasal route as needed for Congestion.  12    sodium chloride 3% 3 % nebulizer solution Take 4 mLs by nebulization every 8 (eight) hours.      thiamine 100 MG tablet 1 tablet (100 mg total) by Per G Tube route once daily.       Scheduled Medications:  albuterol-ipratropium, 3 mL, Nebulization, Q4H  aspirin, 81 mg, Per G Tube, Daily  atorvastatin, 20 mg, Per G Tube, Daily  clopidogreL, 75 mg, Per G Tube, Daily  enoxaparin, 40 mg, Subcutaneous, Daily  folic acid, 1 mg, Per G Tube, Daily  glycopyrrolate, 1 mg, Per G Tube, TID  melatonin, 6 mg, Per G Tube, Nightly  piperacillin-tazobactam (ZOSYN) IVPB, 4.5 g, Intravenous, Q8H  polyethylene glycol, 17 g, Per G Tube, BID  sodium chloride 3%, 4 mL, Nebulization, Q8H  thiamine, 100 mg, Per G Tube, Daily      PRN: sodium chloride, albuterol-ipratropium, bisacodyL, duke's soln (benadryl 30 mL, mylanta 30 mL, LIDOcaine 30 mL, nystatin 30 mL) 120 mL, ondansetron, oxyCODONE, sodium chloride, sodium chloride  0.9%  Infusions:   Estimated Creatinine Clearance: 82.3 mL/min (based on SCr of 0.7 mg/dL).    Assessment/Plan:      * Acute on chronic respiratory failure    Pt with SCC of the tongue s/p total glossectomy, chemowith cisplatin, and nearing completion of radiation with trach, COPD, asthma, HTN, CAD s/p PCI presenting for acute on chronic hypoxemic respiratory failure. He is on 5L with trach collar at home. He has had increased yellow secretions, work of breathing and SOB for the past several days. BNP elevated to 1600 on admission with pulmonary edema and new cardiomegaly on CXR. Trop wnl. Bicarb is elevated suggesting chronic hypercapnia. Suctioning has cleared thick mucous. He was able to be weaned down to his home O2, however after ~ 20 minutes he desats again, requiring deep suction with resolution of hypoxia. Now stable on home O2. Diuresed -3L        -Pt stable on home O2 for 24 hours, ready to stepdown to floor  - CAP coverage with ceftriaxone/azithro  - Methylprednisolone 40 mg x5 days for possible element of COPD exacerbation  - Duonebs q4  - Hypertonic saline nebs  - Chest PT  - Suction PRN    4/21 Transfer to hospital medicine . 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  initially requiring vent but has now been stable on home trach collar for 24 hours. sats 93% on 8L via trach collar.  Awaiting cultures from sputum, on CAP coverage with ceftriaxone. completed Azithromycin. continue solumedrol 40mg IV daily.   4/22 sats 95% on trach collar 10/ fio2 60% . completed azithromycin.   started on Zosyn for possible aspiration  repeat CX ray-in the inferior hemithorax on the left side consistent with airspace consolidation/volume loss in the left lower lobe is again appreciated, but the lung zones are essentially stable since the prior exam and demonstrate no new areas of airspace  consolidation or volume loss.  respiratory culture 4/19 with pansensitive pseudomonas.   4/23 stable on 10L/60% Fio2     Aspiration pneumonia    4/22 sats 95% on trach collar 10/ fio2 60% . completed azithromycin.   started on Zosyn for possible aspiration  repeat CX ray-in the inferior hemithorax on the left side consistent with airspace consolidation/volume loss in the left lower lobe is again appreciated, but the lung zones are essentially stable since the prior exam and demonstrate no new areas of airspace consolidation or volume loss.  respiratory culture 4/19 with pseudomonas.           Dysphagia  Nutrition consulted. Most recent weight and BMI monitored-          Malnutrition (Moderate to Severe)  Weight Loss (Malnutrition): greater than 10% in 6 months                 Measurements:  Wt Readings from Last 1 Encounters:   04/20/22 61.9 kg (136 lb 7.4 oz)   Body mass index is 20.75 kg/m².    Recommendations: Recommendation/Intervention: 1.  Goals: Meet % EEN, EPN by RD f/u date    Patient has been screened and assessed by RD. RD will follow patient.    4/21 secondary to above. on G tube feedings   4/22 changed to bolus GT feedings.   4/23 intermittently refusing bolus GT feedings.      Hypokalemia  replaced       Chronic respiratory failure with hypoxia, on home O2 therapy  secondary to COPD. on 5L oxygen  at home    Protein-calorie malnutrition  Nutrition consulted. Most recent weight and BMI monitored-          Malnutrition (Moderate to Severe)  Weight Loss (Malnutrition): greater than 10% in 6 months              Measurements:  Wt Readings from Last 1 Encounters:   04/20/22 61.9 kg (136 lb 7.4 oz)   Body mass index is 20.75 kg/m².    Recommendations: Recommendation/Intervention: 1.  Goals: Meet % EEN, EPN by RD f/u date    Patient has been screened and assessed by RD. RD will follow patient.      Normocytic anemia    Patient's with Normocytic anemia.. Hemoglobin stable. Etiology likely due to  chronic disease .  Current CBC reviewed-  Recent Labs   Lab 04/19/22  1442 04/20/22  0531 04/21/22  0333   HGB 6.5* 9.2* 9.8*     Monitor CBC and transfuse if H/H drops below 7/21.       Other hyperlipidemia  Continue home statin         Primary hypertension    Chronic, controlled.  Latest blood pressure and vitals reviewed-   Temp:  [96.9 °F (36.1 °C)-99.2 °F (37.3 °C)]   Pulse:  []   Resp:  [18-61]   BP: ()/(62-95)   SpO2:  [91 %-99 %] .   Home meds for hypertension were reviewed- amlodipine, losartan and metoprolol  held for now  PRN meds if BP> 180/110 mm HG      Coronary artery disease involving native coronary artery of native heart without angina pectoris    Continue ASA, plavix, and statin       Squamous cell cancer of tongue    12/15/21 FNA left neck mass : squamous cell carcinoma, p16 negative  1/4/22 total glossectomy, bilateral neck dissection, bilateral cervical facial advancement flaps and anterolateral thigh free flap reconstruction of his glossectomy defect.  Final path :  [id number] (+microscopic SALINAS, single contralateral node), superior soft tissue margin of left neck with tumor.  2/28/22 start chemoradiation  Finished chemo with cisplatin 4/6. Was going to complete final dose of radiation tomorrow.     -Rad/onc consulted for radiation while inpatient  4/22  s/p radiation oncology eval -on adjuvant chemoradiation, completed 31/33 fractions of RT.  renitiation of RT  inpatient when patient able to tolerate.         VTE Risk Mitigation (From admission, onward)         Ordered     enoxaparin injection 40 mg  Daily         04/19/22 1123     IP VTE HIGH RISK PATIENT  Once         04/19/22 1123     Place sequential compression device  Until discontinued         04/19/22 1123                Discharge Planning   NISA: 4/28/2022     Code Status: Full Code   Is the patient medically ready for discharge?: No    Reason for patient still in hospital (select all that apply): Treatment  Discharge Plan A:  Home Health                  Jordan Armenta MD  Department of Hospital Medicine   [facility] - Cardiac Medical ICU

## 2022-04-24 NOTE — CARE UPDATE
RAPID RESPONSE NURSE ROUND       Rounding completed with charge RN, Gabino. No concerns verbalized at this time. Instructed to call [phone] for further concerns or assistance.

## 2022-04-24 NOTE — PLAN OF CARE
"  Problem: Adult Inpatient Plan of Care  Goal: Plan of Care Review  Outcome: Ongoing, Progressing  Goal: Patient-Specific Goal (Individualized)  Outcome: Ongoing, Progressing  Goal: Absence of Hospital-Acquired Illness or Injury  Outcome: Ongoing, Progressing  Goal: Optimal Comfort and Wellbeing  Outcome: Ongoing, Progressing  Goal: Readiness for Transition of Care  Outcome: Ongoing, Progressing     Problem: Impaired Wound Healing  Goal: Optimal Wound Healing  Outcome: Ongoing, Progressing     Problem: Skin Injury Risk Increased  Goal: Skin Health and Integrity  Outcome: Ongoing, Progressing     Problem: Communication Impairment (Artificial Airway)  Goal: Effective Communication  Outcome: Ongoing, Progressing     Problem: Device-Related Complication Risk (Artificial Airway)  Goal: Optimal Device Function  Outcome: Ongoing, Progressing     Problem: Skin and Tissue Injury (Artificial Airway)  Goal: Absence of Device-Related Skin or Tissue Injury  Outcome: Ongoing, Progressing     Problem: Noninvasive Ventilation Acute  Goal: Effective Unassisted Ventilation and Oxygenation  Outcome: Ongoing, Progressing     Problem: Fall Injury Risk  Goal: Absence of Fall and Fall-Related Injury  Outcome: Ongoing, Progressing    Pt Aox4, VSS, and denies pain. Pt refused bolus feed overnight, per patient he "felt full". IV abx administered. No acute events overnight.     "

## 2022-04-24 NOTE — PROGRESS NOTES
[facility] - Cardiac Medical ICU  [facility]  Progress Note    Patient Name: Fareed Richard Jr.  MRN: [id number]  Patient Class: IP- Inpatient   Admission Date: 4/19/2022  Length of Stay: 5 days  Attending Physician: Jordan Armenta MD  Primary Care Provider: Kodi Tubbs MD        Subjective:     Principal Problem:Acute on chronic respiratory failure        HPI:  Mr. 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 Per wife he has had several days of increasing lethargy, increased work of breathing and secretions. Yesterday she became particularly concerned when he became slightly less responsive and interactive. Wife also notes increased yellow thick secretions from trach site. Upon EMS arrival he was on his home 5 L by trach collar and saturating 93% with significant wheezing.  He received 1 DuoNeb by them.  He was also suctioned.     Currently patient is alert, following commands.  He is responding to yes no questions.  He denies any chest pain but does endorse shortness of breath and cough.  He denies any abdominal pain or pain elsewhere       ED course:   He was given 500ccs IVF as well as one time doses of vanc/cefepime/azithro. Labs significant for BNP of 1600, WBC 5k, K 5.6, Bicarb 35, VBG 7.26/87/25/40. Trop wnl. CXR with new cardiomegaly and pulmonary edema. Suctioned with return of copious secretions. ICU was consulted for acute hypoxemic respiratory failure and he will be admitted for further management.              Overview/Hospital Course:    Admitted to ICU, started on CAP coverage and solumedrol. Quickly weaned off vent after return of copious secretions on suction. Has been maintaining sats well on home O2. Rad/onc consulted for completion of radiology while inpatient. Pending culture sensitivity.    4/21 Transfer to hospital  medicine . 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  initially requiring vent but has now been stable on home trach collar for 24 hours. sats 93% on 8L via trach collar.  Awaiting cultures from sputum, on CAP coverage with ceftriaxone. completed Azithromycin. continue solumedrol 40mg IV daily.  rad/onc consulted this morning-he was supposed to complete radiation outpt but was admitted, attempting to set up for inpatient  4/22 changed to bolus GT feedings. s/p radiation oncology eval -on adjuvant chemoradiation, completed 31/33 fractions of RT.  renitiation of RT  inpatient when patient able to tolerate. sats 95% on trach collar 10/ fio2 60% . completed azithromycin.  started on Zosyn for possible aspiration  repeat CX ray-in the inferior hemithorax on the left side consistent with airspace consolidation/volume loss in the left lower lobe is again appreciated, but the lung zones are essentially stable since the prior exam and demonstrate no new areas of airspace consolidation or volume loss. respiratory culture 4/19 with pansensitive  pseudomonas.     4/23 intermittently refusing bolus GT feedings.stable on 10L/60% Fio2 . completed radiation sessions. off solumedrol  today. PT recs SNF  4/24 K and P replaced            Review of Systems:   Pain scale:   Constitutional:  fever,  chills, headache, vision loss, hearing loss, weight loss, Generalized weakness, falls, loss of smell, loss of taste, poor appetite,  sore throat, voice change,immunocompromised state.   Respiratory: cough, shortness of breath.   Cardiovascular: chest pain, dizziness, palpitations, orthopnea, swelling of feet, syncope  Gastrointestinal: nausea, vomiting, abdominal pain, diarrhea, black stool,  blood in stool, change in bowel habits  Genitourinary: hematuria, dysuria, urgency, frequency  Integument/Breast: rash,   pruritis  Hematologic/Lymphatic: easy bruising, lymphadenopathy  Musculoskeletal: arthralgias , myalgias, back pain, neck pain, knee pain  Neurological: confusion, seizures, tremors, slurred speech  Behavioral/Psych:  depression, anxiety, auditory or visual hallucinations     OBJECTIVE:     Physical Exam:  Body mass index is 20.75 kg/m².    Constitutional: Appears well-developed and well-nourished. thin built   Head: Normocephalic and atraumatic.   Neck: Normal range of motion. Neck supple. trach collar  Cardiovascular: Normal heart rate.  Regular heart rhythm.  Pulmonary/Chest: Effort normal.   Abdominal: No distension.  No tenderness, PEG tube insitu   Musculoskeletal: Normal range of motion. No edema.   Neurological: Alert and oriented to person, place, and time.   Skin: Skin is warm and dry.   Psychiatric: Normal mood and affect. Behavior is normal.                  Vital Signs  Temp: 98.1 °F (36.7 °C) (04/24/22 1134)  Pulse: 100 (04/24/22 1134)  Resp: 18 (04/24/22 1134)  BP: 126/63 (04/24/22 1134)  SpO2: (Abnormal) 92 % (04/24/22 1134)     24 Hour VS Range    Temp:  [96.8 °F (36 °C)-99.3 °F (37.4 °C)]   Pulse:  []   Resp:  [18-31]   BP: (121-187)/(63-91)   SpO2:  [89 %-100 %]     Intake/Output Summary (Last 24 hours) at 4/24/2022 1229  Last data filed at 4/24/2022 1200  Gross per 24 hour   Intake 2220 ml   Output 1075 ml   Net 1145 ml         I/O This Shift:  I/O this shift:  In: 1150 [NG/GT:1150]  Out: 450 [Urine:450]    Wt Readings from Last 3 Encounters:   04/20/22 61.9 kg (136 lb 7.4 oz)   04/06/22 63 kg (138 lb 14.2 oz)   04/04/22 63 kg (139 lb)       I have personally reviewed the vitals and recorded Intake/Output     Laboratory/Diagnostic Data:    CBC/Anemia Labs: Coags:    Recent Labs   Lab 04/22/22  0244 04/23/22  0312 04/24/22  0406   WBC 8.02 5.00 7.49   HGB 10.3* 10.5* 9.9*   HCT 32.8* 34.1* 33.3*    305 296   MCV 94 94 98   RDW 17.1* 17.0* 16.7*    Recent Labs   Lab 04/19/22  0929    APTT <21.0        Chemistries: ABG:   Recent Labs   Lab 04/22/22  0244 04/23/22  0312 04/24/22  0406    143 144   K 3.5 4.3 3.0*   CL 90* 93* 98   CO2 41* 37* 37*   BUN 42* 35* 38*   CREATININE 0.7 0.8 0.7   CALCIUM 9.3 9.4 9.3   PROT 6.2 5.7* 6.1   BILITOT 0.3 0.3 0.2   ALKPHOS 82 82 74   ALT 21 18 17   AST 22 15 15   MG 2.1 2.3 2.2   PHOS 2.9 4.2 2.6*    Recent Labs   Lab 04/19/22  0933 04/19/22  1107 04/19/22  1457 04/19/22  1808   PH 7.232* 7.263* 7.383 7.474*   PCO2 101.5* 87.3* 56.5* 56.7*   PO2 16* 25* 26* 20*   HCO3 42.7* 39.5* 33.6*  --    POCSATURATED 14* 34* 46* 34*   BE 15 12 9 18        POCT Glucose: HbA1c:    No results for input(s): POCTGLUCOSE in the last 168 hours. No results found for: HGBA1C     Cardiac Enzymes: Ejection Fractions:    No results for input(s): CPK, CPKMB, MB, TROPONINI in the last 72 hours. EF   Date Value Ref Range Status   04/19/2022 60 % Final   12/30/2021 65 % Final          No results for input(s): COLORU, APPEARANCEUA, PHUR, SPECGRAV, PROTEINUA, GLUCUA, KETONESU, BILIRUBINUA, OCCULTUA, NITRITE, UROBILINOGEN, LEUKOCYTESUR, RBCUA, WBCUA, BACTERIA, SQUAMEPITHEL, HYALINECASTS in the last 48 hours.    Invalid input(s): WRIGHTSUR    Procalcitonin (ng/mL)   Date Value   04/19/2022 0.09     Lactate (Lactic Acid) (mmol/L)   Date Value   04/19/2022 1.0     BNP (pg/mL)   Date Value   04/22/2022 132 (H)   04/19/2022 1,607 (H)   03/21/2022 242 (H)   11/02/2019 260 (H)   12/27/2018 239 (H)     No results found for: CRP, SEDRATE  No results found for: DDIMER  Ferritin (ng/mL)   Date Value   02/16/2022 135     No results found for: LDH  Troponin I (ng/mL)   Date Value   04/19/2022 0.018   11/02/2019 0.016   12/27/2018 <0.006     No results found for this or any previous visit.  SARS-CoV2 (COVID-19) Qualitative PCR (no units)   Date Value   12/31/2021 Not Detected     SARS-CoV-2 RNA, Amplification, Qual (no units)   Date Value   01/13/2022 Negative   01/03/2022 Negative     POC Rapid  COVID (no units)   Date Value   04/19/2022 Negative       Microbiology labs for the last week  Microbiology Results (last 7 days)     Procedure Component Value Units Date/Time    Blood culture x two cultures. Draw prior to antibiotics. [[id number]] Collected: 04/19/22 0930    Order Status: Completed Specimen: Blood from Peripheral, Antecubital, Left Updated: 04/24/22 1012     Blood Culture, Routine No growth after 5 days.    Narrative:      Aerobic and anaerobic    Blood culture x two cultures. Draw prior to antibiotics. [[id number]] Collected: 04/19/22 0925    Order Status: Completed Specimen: Blood from Peripheral, Antecubital, Right Updated: 04/24/22 1012     Blood Culture, Routine No growth after 5 days.    Narrative:      Aerobic and anaerobic    Culture, Respiratory [[id number]]  (Abnormal)  (Susceptibility) Collected: 04/19/22 1119    Order Status: Completed Specimen: Respiratory from Sputum Updated: 04/23/22 1052     Respiratory Culture No S aureus isolated.      PSEUDOMONAS AERUGINOSA  Rare  Normal respiratory ishmael also present       Gram Stain (Respiratory) <10 epithelial cells per low power field.     Gram Stain (Respiratory) Rare WBC's     Gram Stain (Respiratory) Rare Gram positive cocci     Gram Stain (Respiratory) Rare Gram positive rods    Respiratory Infection Panel (PCR), Nasopharyngeal [[id number]] Collected: 04/19/22 1434    Order Status: Canceled Specimen: Nasopharyngeal Swab     Influenza A & B by Molecular [[id number]]     Order Status: Canceled Specimen: Nasopharyngeal Swab           Reviewed and noted in plan where applicable- Please see chart for full lab data.    Lines/Drains:       Peripheral IV - Single Lumen 04/19/22 0920 20 G Right Antecubital (Active)   Site Assessment Clean;Dry;Intact;No redness;No swelling;No drainage;No warmth 04/21/22 0701   Extremity Assessment Distal to IV No abnormal discoloration;No redness;No swelling;No warmth 04/21/22 0701   Line Status Infusing 04/21/22 0701    Dressing Status Clean;Dry;Intact 04/21/22 0701   Dressing Intervention Integrity maintained 04/21/22 0701   Dressing Change Due 04/23/22 04/21/22 0701   Site Change Due 04/23/22 04/21/22 0701   Reason Not Rotated Not due 04/21/22 0701   Number of days: 2            Peripheral IV - Single Lumen 04/19/22 1552 18 G Anterior;Distal;Left Forearm (Active)   Site Assessment Dry;Clean;Intact;No redness;No swelling;No warmth;No drainage 04/21/22 0701   Extremity Assessment Distal to IV No abnormal discoloration;No redness;No swelling;No warmth 04/21/22 0701   Line Status Flushed;Saline locked 04/21/22 0701   Dressing Status Clean;Dry;Intact 04/21/22 0701   Dressing Intervention Integrity maintained 04/21/22 0701   Dressing Change Due 04/23/22 04/21/22 0701   Site Change Due 04/23/22 04/21/22 0701   Reason Not Rotated Not due 04/21/22 0701   Number of days: 1            Gastrostomy/Enterostomy 01/04/22 Percutaneous endoscopic gastrostomy (PEG) LUQ (Active)   Securement secured to abdomen 04/21/22 0701   Interventions Prior to Feeding patency checked;residual checked 04/21/22 0701   Feeding Type intermittent;bolus 04/21/22 0701   Clamp Status/Tolerance clamped;no abdominal distention;no abdominal discomfort;no emesis;no nausea;no residual;no restlessness 04/21/22 0701   Feeding Action feeding continued 04/21/22 0305   Dressing dry and intact 04/21/22 0701   Insertion Site no redness;no warmth;no drainage;no tenderness;no swelling 04/21/22 0701   Site Care secured w/ tape 04/21/22 0305   Flush/Irrigation flushed w/;water;no resistance met 04/21/22 0701   Dressing Change Due 04/21/22 04/21/22 0701   Intake (mL) 120 mL 04/21/22 0701   Formula Name Impact Peptide 1.5 04/21/22 0305   Tube Feeding Intake (mL) 500 04/21/22 0701   Residual Amount (ml) 0 ml 04/21/22 0701   Number of days: 107       Imaging  ECG Results          EKG 12-lead (Final result)  Result time 04/19/22 17:42:02    Final result by Interface, Lab In [facility]  (04/19/22 17:42:02)             Narrative:    Test Reason : R06.02,    Vent. Rate : 081 BPM     Atrial Rate : 081 BPM     P-R Int : 158 ms          QRS Dur : 092 ms      QT Int : 366 ms       P-R-T Axes : 061 -70 045 degrees     QTc Int : 425 ms    Normal sinus rhythm  Left axis deviation Now present  Incomplete right bundle branch block  Abnormal ECG  When compared with ECG of 10-MADONNA-2022 10:11,    Confirmed by DIONNE SINGH MD ([id number]) on 4/19/2022 5:41:52 PM    Referred By: [email or web address]   SELF           Confirmed By:DIONNE SINGH MD                            Results for orders placed during the hospital encounter of 04/19/22    Echo    Interpretation Summary  · The left ventricle is normal in size with normal systolic function. The estimated ejection fraction is 60%.  · Normal right ventricular size with normal right ventricular systolic function.  · Normal left ventricular diastolic function.  · The aortic root is mildly dilated.  · Mechanically ventilated; cannot use inferior caval vein diameter to estimate central venous pressure. The IVC is not enlarged and does collapse somewhat with the respiratory cycle, essentially ruling out venous hypertension.      US Lower Extremity Veins Bilateral  Narrative: EXAMINATION:  US LOWER EXTREMITY VEINS BILATERAL    CLINICAL HISTORY:  R/O DVT;    TECHNIQUE:  Duplex and color flow Doppler and dynamic compression was performed of the bilateral lower extremity veins was performed.    COMPARISON:  None    FINDINGS:  Right thigh veins: The common femoral, superficial femoral, popliteal, upper greater saphenous, and deep femoral veins are patent and free of thrombus. The veins are normally compressible and have normal phasic flow and augmentation response.    Right calf veins: The visualized calf veins are patent.    Left thigh veins: The common femoral, superficial femoral, popliteal, upper greater saphenous, and deep femoral veins are patent and free of thrombus. The veins are  normally compressible and have normal phasic flow and augmentation response.    Left calf veins: The visualized calf veins are patent.  Impression: No evidence of deep venous thrombosis in either lower extremity.    Electronically signed by resident: Anuradha Arthur  Date:    04/23/2022  Time:    17:54    Electronically signed by: Marcos Toth MD  Date:    04/23/2022  Time:    18:01      Labs, Imaging, EKG and Diagnostic results from 4/24/2022 were reviewed.    Medications:  Medication list was reviewed and changes noted under Assessment/Plan.  No current facility-administered medications on file prior to encounter.     Current Outpatient Medications on File Prior to Encounter   Medication Sig Dispense Refill    amLODIPine (NORVASC) 10 MG tablet Take 10 mg by mouth once daily.      atorvastatin (LIPITOR) 20 MG tablet 1 tablet (20 mg total) by Per G Tube route once daily. 90 tablet 3    glycopyrrolate (CUVPOSA) 1 mg/5 mL (0.2 mg/mL) Soln Take 5 mLs (1 mg total) by mouth 3 (three) times daily as needed (TO REDUCE SECRETIONS). 473 mL 1    acetaminophen (TYLENOL) 325 MG tablet 2 tablets (650 mg total) by Per G Tube route every 6 (six) hours.  0    albuterol-ipratropium (DUO-NEB) 2.5 mg-0.5 mg/3 mL nebulizer solution Take 3 mLs by nebulization every 4 (four) hours as needed for Wheezing. Rescue 75 mL 0    aspirin 81 MG Chew 1 tablet (81 mg total) by Per G Tube route once daily.  0    bisacodyL (DULCOLAX) 10 mg Supp Place 1 suppository (10 mg total) rectally daily as needed.  0    clopidogreL (PLAVIX) 75 mg tablet 1 tablet (75 mg total) by Per G Tube route once daily. 30 tablet 11    duke's soln (benadryl 30 mL, mylanta 30 mL, LIDOcaine 30 mL, nystatin 30 mL) 120mL Take 10 mLs by mouth 4 (four) times daily as needed (mucositis). 360 mL 0    enoxaparin (LOVENOX) 40 mg/0.4 mL Syrg Inject 0.4 mLs (40 mg total) into the skin once daily.      folic acid (FOLVITE) 1 MG tablet 1 tablet (1 mg total) by Per G Tube route  once daily.  0    guaiFENesin 200 mg/5 mL Liqd Take 400 mg by mouth every 4 (four) hours as needed (for secretions). 473 mL 3    losartan (COZAAR) 50 MG tablet 1 tablet (50 mg total) by Per G Tube route once daily. 90 tablet 3    melatonin (MELATIN) 3 mg tablet 2 tablets (6 mg total) by Per G Tube route nightly.  0    metoprolol tartrate (LOPRESSOR) 100 MG tablet 1 tablet (100 mg total) by Per G Tube route 2 (two) times daily. 60 tablet 11    ondansetron (ZOFRAN-ODT) 8 MG TbDL Take 1 tablet (8 mg total) by mouth every 8 (eight) hours as needed (nausea). 30 tablet 1    oxyCODONE (ROXICODONE) 5 mg/5 mL Soln 5 mLs (5 mg total) by Per G Tube route every 4 (four) hours as needed (Pain). 150 mL 0    polyethylene glycol (GLYCOLAX) 17 gram PwPk 17 g by Per G Tube route 2 (two) times daily.  0    senna-docusate 8.6-50 mg (PERICOLACE) 8.6-50 mg per tablet 1 tablet by Per G Tube route 2 (two) times daily.      sodium chloride (OCEAN) 0.65 % nasal spray 1 spray by Nasal route as needed for Congestion.  12    sodium chloride 3% 3 % nebulizer solution Take 4 mLs by nebulization every 8 (eight) hours.      thiamine 100 MG tablet 1 tablet (100 mg total) by Per G Tube route once daily.       Scheduled Medications:  albuterol-ipratropium, 3 mL, Nebulization, Q4H  aspirin, 81 mg, Per G Tube, Daily  atorvastatin, 20 mg, Per G Tube, Daily  clopidogreL, 75 mg, Per G Tube, Daily  enoxaparin, 40 mg, Subcutaneous, Daily  folic acid, 1 mg, Per G Tube, Daily  glycopyrrolate, 1 mg, Per G Tube, TID  melatonin, 6 mg, Per G Tube, Nightly  piperacillin-tazobactam (ZOSYN) IVPB, 4.5 g, Intravenous, Q8H  polyethylene glycol, 17 g, Per G Tube, BID  sodium chloride 3%, 4 mL, Nebulization, Q8H  thiamine, 100 mg, Per G Tube, Daily      PRN: sodium chloride, albuterol-ipratropium, bisacodyL, duke's soln (benadryl 30 mL, mylanta 30 mL, LIDOcaine 30 mL, nystatin 30 mL) 120 mL, ondansetron, oxyCODONE, sodium chloride, sodium chloride  0.9%  Infusions:   Estimated Creatinine Clearance: 82.3 mL/min (based on SCr of 0.7 mg/dL).    Assessment/Plan:      * Acute on chronic respiratory failure    Pt with SCC of the tongue s/p total glossectomy, chemowith cisplatin, and nearing completion of radiation with trach, COPD, asthma, HTN, CAD s/p PCI presenting for acute on chronic hypoxemic respiratory failure. He is on 5L with trach collar at home. He has had increased yellow secretions, work of breathing and SOB for the past several days. BNP elevated to 1600 on admission with pulmonary edema and new cardiomegaly on CXR. Trop wnl. Bicarb is elevated suggesting chronic hypercapnia. Suctioning has cleared thick mucous. He was able to be weaned down to his home O2, however after ~ 20 minutes he desats again, requiring deep suction with resolution of hypoxia. Now stable on home O2. Diuresed -3L        -Pt stable on home O2 for 24 hours, ready to stepdown to floor  - CAP coverage with ceftriaxone/azithro  - Methylprednisolone 40 mg x5 days for possible element of COPD exacerbation  - Duonebs q4  - Hypertonic saline nebs  - Chest PT  - Suction PRN    4/21 Transfer to hospital medicine . 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  initially requiring vent but has now been stable on home trach collar for 24 hours. sats 93% on 8L via trach collar.  Awaiting cultures from sputum, on CAP coverage with ceftriaxone. completed Azithromycin. continue solumedrol 40mg IV daily.   4/22 sats 95% on trach collar 10/ fio2 60% . completed azithromycin.   started on Zosyn for possible aspiration  repeat CX ray-in the inferior hemithorax on the left side consistent with airspace consolidation/volume loss in the left lower lobe is again appreciated, but the lung zones are essentially stable since the prior exam and demonstrate no new areas of airspace  consolidation or volume loss.  respiratory culture 4/19 with pansensitive pseudomonas.   4/23 stable on 10L/60% Fio2     Aspiration pneumonia    4/22 sats 95% on trach collar 10/ fio2 60% . completed azithromycin.   started on Zosyn for possible aspiration  repeat CX ray-in the inferior hemithorax on the left side consistent with airspace consolidation/volume loss in the left lower lobe is again appreciated, but the lung zones are essentially stable since the prior exam and demonstrate no new areas of airspace consolidation or volume loss.  respiratory culture 4/19 with pseudomonas.           Dysphagia  Nutrition consulted. Most recent weight and BMI monitored-          Malnutrition (Moderate to Severe)  Weight Loss (Malnutrition): greater than 10% in 6 months                 Measurements:  Wt Readings from Last 1 Encounters:   04/20/22 61.9 kg (136 lb 7.4 oz)   Body mass index is 20.75 kg/m².    Recommendations: Recommendation/Intervention: 1.  Goals: Meet % EEN, EPN by RD f/u date    Patient has been screened and assessed by RD. RD will follow patient.    4/21 secondary to above. on G tube feedings   4/22 changed to bolus GT feedings.   4/23 intermittently refusing bolus GT feedings.      Hypokalemia  replaced       Chronic respiratory failure with hypoxia, on home O2 therapy  secondary to COPD. on 5L oxygen  at home    Protein-calorie malnutrition  Nutrition consulted. Most recent weight and BMI monitored-          Malnutrition (Moderate to Severe)  Weight Loss (Malnutrition): greater than 10% in 6 months              Measurements:  Wt Readings from Last 1 Encounters:   04/20/22 61.9 kg (136 lb 7.4 oz)   Body mass index is 20.75 kg/m².    Recommendations: Recommendation/Intervention: 1.  Goals: Meet % EEN, EPN by RD f/u date    Patient has been screened and assessed by RD. RD will follow patient.      Normocytic anemia    Patient's with Normocytic anemia.. Hemoglobin stable. Etiology likely due to  chronic disease .  Current CBC reviewed-  Recent Labs   Lab 04/19/22  1442 04/20/22  0531 04/21/22  0333   HGB 6.5* 9.2* 9.8*     Monitor CBC and transfuse if H/H drops below 7/21.       Other hyperlipidemia  Continue home statin         Primary hypertension    Chronic, controlled.  Latest blood pressure and vitals reviewed-   Temp:  [96.9 °F (36.1 °C)-99.2 °F (37.3 °C)]   Pulse:  []   Resp:  [18-61]   BP: ()/(62-95)   SpO2:  [91 %-99 %] .   Home meds for hypertension were reviewed- amlodipine, losartan and metoprolol  held for now  PRN meds if BP> 180/110 mm HG      Coronary artery disease involving native coronary artery of native heart without angina pectoris    Continue ASA, plavix, and statin       Squamous cell cancer of tongue    12/15/21 FNA left neck mass : squamous cell carcinoma, p16 negative  1/4/22 total glossectomy, bilateral neck dissection, bilateral cervical facial advancement flaps and anterolateral thigh free flap reconstruction of his glossectomy defect.  Final path :  [id number] (+microscopic SALINAS, single contralateral node), superior soft tissue margin of left neck with tumor.  2/28/22 start chemoradiation  Finished chemo with cisplatin 4/6. Was going to complete final dose of radiation tomorrow.       -Rad/onc consulted for radiation while inpatient  4/22  s/p radiation oncology eval -on adjuvant chemoradiation, completed 31/33 fractions of RT.  renitiation of RT  inpatient when patient able to tolerate.          4/23 completed radiation sessions.        VTE Risk Mitigation (From admission, onward)         Ordered     enoxaparin injection 40 mg  Daily         04/19/22 1123     IP VTE HIGH RISK PATIENT  Once         04/19/22 1123     Place sequential compression device  Until discontinued         04/19/22 1123                Discharge Planning   NISA: 4/28/2022     Code Status: Full Code   Is the patient medically ready for discharge?: No    Reason for patient still in hospital (select  all that apply): Treatment  Discharge Plan A: Home Health                  Jordan Armenta MD  Department of Hospital Medicine   [facility] - Cardiac Medical ICU

## 2022-04-24 NOTE — PLAN OF CARE
Problem: Adult Inpatient Plan of Care  Goal: Plan of Care Review  Outcome: Ongoing, Progressing  Goal: Absence of Hospital-Acquired Illness or Injury  Outcome: Ongoing, Progressing  Goal: Optimal Comfort and Wellbeing  Outcome: Ongoing, Progressing     Problem: Impaired Wound Healing  Goal: Optimal Wound Healing  Outcome: Ongoing, Progressing     Problem: Skin Injury Risk Increased  Goal: Skin Health and Integrity  Outcome: Ongoing, Progressing     Problem: Communication Impairment (Artificial Airway)  Goal: Effective Communication  Outcome: Ongoing, Progressing     Problem: Skin and Tissue Injury (Artificial Airway)  Goal: Absence of Device-Related Skin or Tissue Injury  Outcome: Ongoing, Progressing     Problem: Fall Injury Risk  Goal: Absence of Fall and Fall-Related Injury  Outcome: Ongoing, Progressing

## 2022-04-24 NOTE — CARE UPDATE
Rapid Response Nurse Follow-up Note     Followed up with patient for an AI alert.  No acute issues or additional concerns at this time. Reviewed plan of care with [facility] [employer].   Please call Rapid Response RN, Kassie Rubin RN if any additional monitoring required or questions or concerns arise at [phone].

## 2022-04-25 PROBLEM — E87.0 HYPERNATREMIA: Status: ACTIVE | Noted: 2022-04-25

## 2022-04-25 LAB
ALBUMIN SERPL BCP-MCNC: 2.6 G/DL (ref 3.5–5.2)
ALP SERPL-CCNC: 74 U/L (ref 55–135)
ALT SERPL W/O P-5'-P-CCNC: 21 U/L (ref 10–44)
ANION GAP SERPL CALC-SCNC: 10 MMOL/L (ref 8–16)
AST SERPL-CCNC: 17 U/L (ref 10–40)
BASOPHILS # BLD AUTO: 0.02 K/UL (ref 0–0.2)
BASOPHILS NFR BLD: 0.3 % (ref 0–1.9)
BILIRUB SERPL-MCNC: 0.2 MG/DL (ref 0.1–1)
BUN SERPL-MCNC: 34 MG/DL (ref 8–23)
BUN SERPL-MCNC: 34 MG/DL (ref 8–23)
BUN SERPL-MCNC: 37 MG/DL (ref 8–23)
CALCIUM SERPL-MCNC: 9.3 MG/DL (ref 8.7–10.5)
CALCIUM SERPL-MCNC: 9.3 MG/DL (ref 8.7–10.5)
CALCIUM SERPL-MCNC: 9.4 MG/DL (ref 8.7–10.5)
CHLORIDE SERPL-SCNC: 101 MMOL/L (ref 95–110)
CHLORIDE SERPL-SCNC: 101 MMOL/L (ref 95–110)
CHLORIDE SERPL-SCNC: 103 MMOL/L (ref 95–110)
CO2 SERPL-SCNC: 34 MMOL/L (ref 23–29)
CO2 SERPL-SCNC: 34 MMOL/L (ref 23–29)
CO2 SERPL-SCNC: 37 MMOL/L (ref 23–29)
CREAT SERPL-MCNC: 0.7 MG/DL (ref 0.5–1.4)
CREAT SERPL-MCNC: 0.8 MG/DL (ref 0.5–1.4)
CREAT SERPL-MCNC: 0.8 MG/DL (ref 0.5–1.4)
DIFFERENTIAL METHOD: ABNORMAL
EOSINOPHIL # BLD AUTO: 0 K/UL (ref 0–0.5)
EOSINOPHIL NFR BLD: 0.2 % (ref 0–8)
ERYTHROCYTE [DISTWIDTH] IN BLOOD BY AUTOMATED COUNT: 17.1 % (ref 11.5–14.5)
EST. GFR  (AFRICAN AMERICAN): >60 ML/MIN/1.73 M^2
EST. GFR  (NON AFRICAN AMERICAN): >60 ML/MIN/1.73 M^2
GLUCOSE SERPL-MCNC: 117 MG/DL (ref 70–110)
GLUCOSE SERPL-MCNC: 225 MG/DL (ref 70–110)
GLUCOSE SERPL-MCNC: 225 MG/DL (ref 70–110)
HCT VFR BLD AUTO: 35.7 % (ref 40–54)
HGB BLD-MCNC: 10.3 G/DL (ref 14–18)
IMM GRANULOCYTES # BLD AUTO: 0.03 K/UL (ref 0–0.04)
IMM GRANULOCYTES NFR BLD AUTO: 0.5 % (ref 0–0.5)
LYMPHOCYTES # BLD AUTO: 0.5 K/UL (ref 1–4.8)
LYMPHOCYTES NFR BLD: 8.3 % (ref 18–48)
MAGNESIUM SERPL-MCNC: 2.2 MG/DL (ref 1.6–2.6)
MCH RBC QN AUTO: 29.7 PG (ref 27–31)
MCHC RBC AUTO-ENTMCNC: 28.9 G/DL (ref 32–36)
MCV RBC AUTO: 103 FL (ref 82–98)
MONOCYTES # BLD AUTO: 0.8 K/UL (ref 0.3–1)
MONOCYTES NFR BLD: 12.5 % (ref 4–15)
NEUTROPHILS # BLD AUTO: 4.7 K/UL (ref 1.8–7.7)
NEUTROPHILS NFR BLD: 78.2 % (ref 38–73)
NRBC BLD-RTO: 0 /100 WBC
PHOSPHATE SERPL-MCNC: 2.7 MG/DL (ref 2.7–4.5)
PHOSPHATE SERPL-MCNC: 2.7 MG/DL (ref 2.7–4.5)
PHOSPHATE SERPL-MCNC: 3.3 MG/DL (ref 2.7–4.5)
PLATELET # BLD AUTO: 287 K/UL (ref 150–450)
PMV BLD AUTO: 8.9 FL (ref 9.2–12.9)
POTASSIUM SERPL-SCNC: 3.2 MMOL/L (ref 3.5–5.1)
POTASSIUM SERPL-SCNC: 3.2 MMOL/L (ref 3.5–5.1)
POTASSIUM SERPL-SCNC: 4.2 MMOL/L (ref 3.5–5.1)
PROT SERPL-MCNC: 6.2 G/DL (ref 6–8.4)
RBC # BLD AUTO: 3.47 M/UL (ref 4.6–6.2)
SODIUM SERPL-SCNC: 145 MMOL/L (ref 136–145)
SODIUM SERPL-SCNC: 145 MMOL/L (ref 136–145)
SODIUM SERPL-SCNC: 150 MMOL/L (ref 136–145)
WBC # BLD AUTO: 6.01 K/UL (ref 3.9–12.7)

## 2022-04-25 PROCEDURE — 99900035 HC TECH TIME PER 15 MIN (STAT)

## 2022-04-25 PROCEDURE — 99900026 HC AIRWAY MAINTENANCE (STAT)

## 2022-04-25 PROCEDURE — 94640 AIRWAY INHALATION TREATMENT: CPT

## 2022-04-25 PROCEDURE — 80053 COMPREHEN METABOLIC PANEL: CPT

## 2022-04-25 PROCEDURE — 83735 ASSAY OF MAGNESIUM: CPT

## 2022-04-25 PROCEDURE — 99233 PR SUBSEQUENT HOSPITAL CARE,LEVL III: ICD-10-PCS | Mod: ,,, | Performed by: HOSPITALIST

## 2022-04-25 PROCEDURE — 63600175 PHARM REV CODE 636 W HCPCS: Performed by: HOSPITALIST

## 2022-04-25 PROCEDURE — 85025 COMPLETE CBC W/AUTO DIFF WBC: CPT | Performed by: STUDENT IN AN ORGANIZED HEALTH CARE EDUCATION/TRAINING PROGRAM

## 2022-04-25 PROCEDURE — 36415 COLL VENOUS BLD VENIPUNCTURE: CPT

## 2022-04-25 PROCEDURE — 99900022

## 2022-04-25 PROCEDURE — 84100 ASSAY OF PHOSPHORUS: CPT

## 2022-04-25 PROCEDURE — 94761 N-INVAS EAR/PLS OXIMETRY MLT: CPT

## 2022-04-25 PROCEDURE — 25000242 PHARM REV CODE 250 ALT 637 W/ HCPCS: Performed by: HOSPITALIST

## 2022-04-25 PROCEDURE — 25000242 PHARM REV CODE 250 ALT 637 W/ HCPCS: Performed by: INTERNAL MEDICINE

## 2022-04-25 PROCEDURE — 27000221 HC OXYGEN, UP TO 24 HOURS

## 2022-04-25 PROCEDURE — 25000003 PHARM REV CODE 250: Performed by: STUDENT IN AN ORGANIZED HEALTH CARE EDUCATION/TRAINING PROGRAM

## 2022-04-25 PROCEDURE — 25000003 PHARM REV CODE 250: Performed by: HOSPITALIST

## 2022-04-25 PROCEDURE — 94668 MNPJ CHEST WALL SBSQ: CPT

## 2022-04-25 PROCEDURE — 63600175 PHARM REV CODE 636 W HCPCS: Performed by: INTERNAL MEDICINE

## 2022-04-25 PROCEDURE — 36415 COLL VENOUS BLD VENIPUNCTURE: CPT | Performed by: HOSPITALIST

## 2022-04-25 PROCEDURE — 80069 RENAL FUNCTION PANEL: CPT | Performed by: HOSPITALIST

## 2022-04-25 PROCEDURE — 99233 SBSQ HOSP IP/OBS HIGH 50: CPT | Mod: ,,, | Performed by: HOSPITALIST

## 2022-04-25 PROCEDURE — 20600001 HC STEP DOWN PRIVATE ROOM

## 2022-04-25 RX ORDER — DEXTROSE MONOHYDRATE 50 MG/ML
INJECTION, SOLUTION INTRAVENOUS CONTINUOUS
Status: ACTIVE | OUTPATIENT
Start: 2022-04-25 | End: 2022-04-25

## 2022-04-25 RX ADMIN — FOLIC ACID 1 MG: 1 TABLET ORAL at 08:04

## 2022-04-25 RX ADMIN — SODIUM CHLORIDE SOLN NEBU 3% 4 ML: 3 NEBU SOLN at 08:04

## 2022-04-25 RX ADMIN — PIPERACILLIN SODIUM AND TAZOBACTAM SODIUM 4.5 G: 4; .5 INJECTION, POWDER, FOR SOLUTION INTRAVENOUS at 04:04

## 2022-04-25 RX ADMIN — IPRATROPIUM BROMIDE AND ALBUTEROL SULFATE 3 ML: 2.5; .5 SOLUTION RESPIRATORY (INHALATION) at 03:04

## 2022-04-25 RX ADMIN — CLOPIDOGREL 75 MG: 75 TABLET, FILM COATED ORAL at 08:04

## 2022-04-25 RX ADMIN — IPRATROPIUM BROMIDE AND ALBUTEROL SULFATE 3 ML: 2.5; .5 SOLUTION RESPIRATORY (INHALATION) at 04:04

## 2022-04-25 RX ADMIN — PIPERACILLIN SODIUM AND TAZOBACTAM SODIUM 4.5 G: 4; .5 INJECTION, POWDER, FOR SOLUTION INTRAVENOUS at 11:04

## 2022-04-25 RX ADMIN — ASPIRIN 81 MG CHEWABLE TABLET 81 MG: 81 TABLET CHEWABLE at 08:04

## 2022-04-25 RX ADMIN — SODIUM CHLORIDE SOLN NEBU 3% 4 ML: 3 NEBU SOLN at 03:04

## 2022-04-25 RX ADMIN — IPRATROPIUM BROMIDE AND ALBUTEROL SULFATE 3 ML: 2.5; .5 SOLUTION RESPIRATORY (INHALATION) at 01:04

## 2022-04-25 RX ADMIN — SODIUM CHLORIDE SOLN NEBU 3% 4 ML: 3 NEBU SOLN at 12:04

## 2022-04-25 RX ADMIN — IPRATROPIUM BROMIDE AND ALBUTEROL SULFATE 3 ML: 2.5; .5 SOLUTION RESPIRATORY (INHALATION) at 07:04

## 2022-04-25 RX ADMIN — IPRATROPIUM BROMIDE AND ALBUTEROL SULFATE 3 ML: 2.5; .5 SOLUTION RESPIRATORY (INHALATION) at 08:04

## 2022-04-25 RX ADMIN — THIAMINE HCL TAB 100 MG 100 MG: 100 TAB at 08:04

## 2022-04-25 RX ADMIN — ATORVASTATIN CALCIUM 20 MG: 20 TABLET, FILM COATED ORAL at 08:04

## 2022-04-25 RX ADMIN — IPRATROPIUM BROMIDE AND ALBUTEROL SULFATE 3 ML: 2.5; .5 SOLUTION RESPIRATORY (INHALATION) at 12:04

## 2022-04-25 RX ADMIN — GLYCOPYRROLATE 1 MG: 1 LIQUID ORAL at 04:04

## 2022-04-25 RX ADMIN — ENOXAPARIN SODIUM 40 MG: 40 INJECTION SUBCUTANEOUS at 04:04

## 2022-04-25 RX ADMIN — MELATONIN TAB 3 MG 6 MG: 3 TAB at 08:04

## 2022-04-25 RX ADMIN — IPRATROPIUM BROMIDE AND ALBUTEROL SULFATE 3 ML: 2.5; .5 SOLUTION RESPIRATORY (INHALATION) at 11:04

## 2022-04-25 RX ADMIN — GLYCOPYRROLATE 1 MG: 1 LIQUID ORAL at 08:04

## 2022-04-25 RX ADMIN — SODIUM CHLORIDE SOLN NEBU 3% 4 ML: 3 NEBU SOLN at 11:04

## 2022-04-25 RX ADMIN — POTASSIUM BICARBONATE 40 MEQ: 391 TABLET, EFFERVESCENT ORAL at 08:04

## 2022-04-25 RX ADMIN — DEXTROSE: 5 SOLUTION INTRAVENOUS at 11:04

## 2022-04-25 RX ADMIN — POTASSIUM BICARBONATE 40 MEQ: 391 TABLET, EFFERVESCENT ORAL at 10:04

## 2022-04-25 NOTE — PROGRESS NOTES
[facility] - Cardiac Medical ICU  [facility] Medicine  Progress Note    Patient Name: Fareed Richard Jr.  MRN: [id number]  Patient Class: IP- Inpatient   Admission Date: 4/19/2022  Length of Stay: 6 days  Attending Physician: Jordan Armenta MD  Primary Care Provider: Kodi Tubbs MD        Subjective:     Principal Problem:Acute on chronic respiratory failure        HPI:  Mr. 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 Per wife he has had several days of increasing lethargy, increased work of breathing and secretions. Yesterday she became particularly concerned when he became slightly less responsive and interactive. Wife also notes increased yellow thick secretions from trach site. Upon EMS arrival he was on his home 5 L by trach collar and saturating 93% with significant wheezing.  He received 1 DuoNeb by them.  He was also suctioned.     Currently patient is alert, following commands.  He is responding to yes no questions.  He denies any chest pain but does endorse shortness of breath and cough.  He denies any abdominal pain or pain elsewhere       ED course:   He was given 500ccs IVF as well as one time doses of vanc/cefepime/azithro. Labs significant for BNP of 1600, WBC 5k, K 5.6, Bicarb 35, VBG 7.26/87/25/40. Trop wnl. CXR with new cardiomegaly and pulmonary edema. Suctioned with return of copious secretions. ICU was consulted for acute hypoxemic respiratory failure and he will be admitted for further management.              Overview/Hospital Course:    Admitted to ICU, started on CAP coverage and solumedrol. Quickly weaned off vent after return of copious secretions on suction. Has been maintaining sats well on home O2. Rad/onc consulted for completion of radiology while inpatient. Pending culture sensitivity.    4/21 Transfer to hospital  medicine . 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  initially requiring vent but has now been stable on home trach collar for 24 hours. sats 93% on 8L via trach collar.  Awaiting cultures from sputum, on CAP coverage with ceftriaxone. completed Azithromycin. continue solumedrol 40mg IV daily.  rad/onc consulted this morning-he was supposed to complete radiation outpt but was admitted, attempting to set up for inpatient  4/22 changed to bolus GT feedings. s/p radiation oncology eval -on adjuvant chemoradiation, completed 31/33 fractions of RT.  renitiation of RT  inpatient when patient able to tolerate. sats 95% on trach collar 10/ fio2 60% . completed azithromycin.  started on Zosyn for possible aspiration  repeat CX ray-in the inferior hemithorax on the left side consistent with airspace consolidation/volume loss in the left lower lobe is again appreciated, but the lung zones are essentially stable since the prior exam and demonstrate no new areas of airspace consolidation or volume loss. respiratory culture 4/19 with pansensitive  pseudomonas.     4/23 intermittently refusing bolus GT feedings.stable on 10L/60% Fio2 . completed radiation sessions. off solumedrol  today. PT recs SNF  4/24 K and P replaced   4/25 sodium 150. started on D5W . repeat renal panel in PM . oxygen tapered to 8L/ Fio2 35%            Review of Systems:   Pain scale:   Constitutional:  fever,  chills, headache, vision loss, hearing loss, weight loss, Generalized weakness, falls, loss of smell, loss of taste, poor appetite,  sore throat, voice change,immunocompromised state.   Respiratory: cough, shortness of breath.   Cardiovascular: chest pain, dizziness, palpitations, orthopnea, swelling of feet, syncope  Gastrointestinal: nausea, vomiting, abdominal pain, diarrhea, black stool,  blood in stool, change in bowel  habits  Genitourinary: hematuria, dysuria, urgency, frequency  Integument/Breast: rash,  pruritis  Hematologic/Lymphatic: easy bruising, lymphadenopathy  Musculoskeletal: arthralgias , myalgias, back pain, neck pain, knee pain  Neurological: confusion, seizures, tremors, slurred speech  Behavioral/Psych:  depression, anxiety, auditory or visual hallucinations     OBJECTIVE:     Physical Exam:  Body mass index is 20.75 kg/m².    Constitutional: Appears well-developed and well-nourished. thin built   Head: Normocephalic and atraumatic.   Neck: Normal range of motion. Neck supple. trach collar  Cardiovascular: Normal heart rate.  Regular heart rhythm.  Pulmonary/Chest: Effort normal.   Abdominal: No distension.  No tenderness, PEG tube insitu   Musculoskeletal: Normal range of motion. No edema.   Neurological: Alert and oriented to person, place, and time.   Skin: Skin is warm and dry.   Psychiatric: Normal mood and affect. Behavior is normal.                  Vital Signs  Temp: 98.2 °F (36.8 °C) (04/25/22 1500)  Pulse: 108 (04/25/22 1458)  Resp: 19 (04/25/22 1216)  BP: 111/65 (04/25/22 1500)  SpO2: (Abnormal) 93 % (04/25/22 1458)     24 Hour VS Range    Temp:  [97.6 °F (36.4 °C)-98.3 °F (36.8 °C)]   Pulse:  []   Resp:  [17-26]   BP: (106-181)/(65-82)   SpO2:  [90 %-100 %]     Intake/Output Summary (Last 24 hours) at 4/25/2022 1524  Last data filed at 4/25/2022 0800  Gross per 24 hour   Intake 910 ml   Output 225 ml   Net 685 ml         I/O This Shift:  I/O this shift:  In: 200 [NG/GT:200]  Out: -     Wt Readings from Last 3 Encounters:   04/20/22 61.9 kg (136 lb 7.4 oz)   04/06/22 63 kg (138 lb 14.2 oz)   04/04/22 63 kg (139 lb)       I have personally reviewed the vitals and recorded Intake/Output     Laboratory/Diagnostic Data:    CBC/Anemia Labs: Coags:    Recent Labs   Lab 04/23/22  0312 04/24/22  0406 04/25/22  0204   WBC 5.00 7.49 6.01   HGB 10.5* 9.9* 10.3*   HCT 34.1* 33.3* 35.7*    296 287   MCV  94 98 103*   RDW 17.0* 16.7* 17.1*    Recent Labs   Lab 04/19/22  0929   APTT <21.0        Chemistries: ABG:   Recent Labs   Lab 04/23/22  0312 04/24/22  0406 04/25/22  0204    144 150*   K 4.3 3.0* 4.2   CL 93* 98 103   CO2 37* 37* 37*   BUN 35* 38* 37*   CREATININE 0.8 0.7 0.7   CALCIUM 9.4 9.3 9.4   PROT 5.7* 6.1 6.2   BILITOT 0.3 0.2 0.2   ALKPHOS 82 74 74   ALT 18 17 21   AST 15 15 17   MG 2.3 2.2 2.2   PHOS 4.2 2.6* 3.3    Recent Labs   Lab 04/19/22  0933 04/19/22  1107 04/19/22  1457 04/19/22  1808   PH 7.232* 7.263* 7.383 7.474*   PCO2 101.5* 87.3* 56.5* 56.7*   PO2 16* 25* 26* 20*   HCO3 42.7* 39.5* 33.6*  --    POCSATURATED 14* 34* 46* 34*   BE 15 12 9 18        POCT Glucose: HbA1c:    No results for input(s): POCTGLUCOSE in the last 168 hours. No results found for: HGBA1C     Cardiac Enzymes: Ejection Fractions:    No results for input(s): CPK, CPKMB, MB, TROPONINI in the last 72 hours. EF   Date Value Ref Range Status   04/19/2022 60 % Final   12/30/2021 65 % Final          No results for input(s): COLORU, APPEARANCEUA, PHUR, SPECGRAV, PROTEINUA, GLUCUA, KETONESU, BILIRUBINUA, OCCULTUA, NITRITE, UROBILINOGEN, LEUKOCYTESUR, RBCUA, WBCUA, BACTERIA, SQUAMEPITHEL, HYALINECASTS in the last 48 hours.    Invalid input(s): WRIGHTSUR    Procalcitonin (ng/mL)   Date Value   04/19/2022 0.09     Lactate (Lactic Acid) (mmol/L)   Date Value   04/19/2022 1.0     BNP (pg/mL)   Date Value   04/22/2022 132 (H)   04/19/2022 1,607 (H)   03/21/2022 242 (H)   11/02/2019 260 (H)   12/27/2018 239 (H)     No results found for: CRP, SEDRATE  No results found for: DDIMER  Ferritin (ng/mL)   Date Value   02/16/2022 135     No results found for: LDH  Troponin I (ng/mL)   Date Value   04/19/2022 0.018   11/02/2019 0.016   12/27/2018 <0.006     No results found for this or any previous visit.  SARS-CoV2 (COVID-19) Qualitative PCR (no units)   Date Value   12/31/2021 Not Detected     SARS-CoV-2 RNA, Amplification, Qual (no units)    Date Value   01/13/2022 Negative   01/03/2022 Negative     POC Rapid COVID (no units)   Date Value   04/19/2022 Negative       Microbiology labs for the last week  Microbiology Results (last 7 days)     Procedure Component Value Units Date/Time    Blood culture x two cultures. Draw prior to antibiotics. [[id number]] Collected: 04/19/22 0930    Order Status: Completed Specimen: Blood from Peripheral, Antecubital, Left Updated: 04/24/22 1012     Blood Culture, Routine No growth after 5 days.    Narrative:      Aerobic and anaerobic    Blood culture x two cultures. Draw prior to antibiotics. [[id number]] Collected: 04/19/22 0925    Order Status: Completed Specimen: Blood from Peripheral, Antecubital, Right Updated: 04/24/22 1012     Blood Culture, Routine No growth after 5 days.    Narrative:      Aerobic and anaerobic    Culture, Respiratory [[id number]]  (Abnormal)  (Susceptibility) Collected: 04/19/22 1119    Order Status: Completed Specimen: Respiratory from Sputum Updated: 04/23/22 1052     Respiratory Culture No S aureus isolated.      PSEUDOMONAS AERUGINOSA  Rare  Normal respiratory ishmael also present       Gram Stain (Respiratory) <10 epithelial cells per low power field.     Gram Stain (Respiratory) Rare WBC's     Gram Stain (Respiratory) Rare Gram positive cocci     Gram Stain (Respiratory) Rare Gram positive rods    Respiratory Infection Panel (PCR), Nasopharyngeal [[id number]] Collected: 04/19/22 1434    Order Status: Canceled Specimen: Nasopharyngeal Swab     Influenza A & B by Molecular [[id number]]     Order Status: Canceled Specimen: Nasopharyngeal Swab           Reviewed and noted in plan where applicable- Please see chart for full lab data.    Lines/Drains:       Peripheral IV - Single Lumen 04/19/22 0920 20 G Right Antecubital (Active)   Site Assessment Clean;Dry;Intact;No redness;No swelling;No drainage;No warmth 04/21/22 0701   Extremity Assessment Distal to IV No abnormal discoloration;No redness;No  swelling;No warmth 04/21/22 0701   Line Status Infusing 04/21/22 0701   Dressing Status Clean;Dry;Intact 04/21/22 0701   Dressing Intervention Integrity maintained 04/21/22 0701   Dressing Change Due 04/23/22 04/21/22 0701   Site Change Due 04/23/22 04/21/22 0701   Reason Not Rotated Not due 04/21/22 0701   Number of days: 2            Peripheral IV - Single Lumen 04/19/22 1552 18 G Anterior;Distal;Left Forearm (Active)   Site Assessment Dry;Clean;Intact;No redness;No swelling;No warmth;No drainage 04/21/22 0701   Extremity Assessment Distal to IV No abnormal discoloration;No redness;No swelling;No warmth 04/21/22 0701   Line Status Flushed;Saline locked 04/21/22 0701   Dressing Status Clean;Dry;Intact 04/21/22 0701   Dressing Intervention Integrity maintained 04/21/22 0701   Dressing Change Due 04/23/22 04/21/22 0701   Site Change Due 04/23/22 04/21/22 0701   Reason Not Rotated Not due 04/21/22 0701   Number of days: 1            Gastrostomy/Enterostomy 01/04/22 Percutaneous endoscopic gastrostomy (PEG) LUQ (Active)   Securement secured to abdomen 04/21/22 0701   Interventions Prior to Feeding patency checked;residual checked 04/21/22 0701   Feeding Type intermittent;bolus 04/21/22 0701   Clamp Status/Tolerance clamped;no abdominal distention;no abdominal discomfort;no emesis;no nausea;no residual;no restlessness 04/21/22 0701   Feeding Action feeding continued 04/21/22 0305   Dressing dry and intact 04/21/22 0701   Insertion Site no redness;no warmth;no drainage;no tenderness;no swelling 04/21/22 0701   Site Care secured w/ tape 04/21/22 0305   Flush/Irrigation flushed w/;water;no resistance met 04/21/22 0701   Dressing Change Due 04/21/22 04/21/22 0701   Intake (mL) 120 mL 04/21/22 0701   Formula Name Impact Peptide 1.5 04/21/22 0305   Tube Feeding Intake (mL) 500 04/21/22 0701   Residual Amount (ml) 0 ml 04/21/22 0701   Number of days: 107       Imaging  ECG Results          EKG 12-lead (Final result)  Result  time 04/19/22 17:42:02    Final result by Interface, Lab In [facility] (04/19/22 17:42:02)             Narrative:    Test Reason : R06.02,    Vent. Rate : 081 BPM     Atrial Rate : 081 BPM     P-R Int : 158 ms          QRS Dur : 092 ms      QT Int : 366 ms       P-R-T Axes : 061 -70 045 degrees     QTc Int : 425 ms    Normal sinus rhythm  Left axis deviation Now present  Incomplete right bundle branch block  Abnormal ECG  When compared with ECG of 10-MADONNA-2022 10:11,    Confirmed by DIONNE SINGH MD ([id number]) on 4/19/2022 5:41:52 PM    Referred By: [email or web address]   SELF           Confirmed By:DIONNE SINGH MD                            Results for orders placed during the hospital encounter of 04/19/22    Echo    Interpretation Summary  · The left ventricle is normal in size with normal systolic function. The estimated ejection fraction is 60%.  · Normal right ventricular size with normal right ventricular systolic function.  · Normal left ventricular diastolic function.  · The aortic root is mildly dilated.  · Mechanically ventilated; cannot use inferior caval vein diameter to estimate central venous pressure. The IVC is not enlarged and does collapse somewhat with the respiratory cycle, essentially ruling out venous hypertension.      US Lower Extremity Veins Bilateral  Narrative: EXAMINATION:  US LOWER EXTREMITY VEINS BILATERAL    CLINICAL HISTORY:  R/O DVT;    TECHNIQUE:  Duplex and color flow Doppler and dynamic compression was performed of the bilateral lower extremity veins was performed.    COMPARISON:  None    FINDINGS:  Right thigh veins: The common femoral, superficial femoral, popliteal, upper greater saphenous, and deep femoral veins are patent and free of thrombus. The veins are normally compressible and have normal phasic flow and augmentation response.    Right calf veins: The visualized calf veins are patent.    Left thigh veins: The common femoral, superficial femoral, popliteal, upper greater saphenous, and  deep femoral veins are patent and free of thrombus. The veins are normally compressible and have normal phasic flow and augmentation response.    Left calf veins: The visualized calf veins are patent.  Impression: No evidence of deep venous thrombosis in either lower extremity.    Electronically signed by resident: Anuradha Arthur  Date:    04/23/2022  Time:    17:54    Electronically signed by: Marcos Toth MD  Date:    04/23/2022  Time:    18:01      Labs, Imaging, EKG and Diagnostic results from 4/25/2022 were reviewed.    Medications:  Medication list was reviewed and changes noted under Assessment/Plan.  No current facility-administered medications on file prior to encounter.     Current Outpatient Medications on File Prior to Encounter   Medication Sig Dispense Refill    amLODIPine (NORVASC) 10 MG tablet Take 10 mg by mouth once daily.      atorvastatin (LIPITOR) 20 MG tablet 1 tablet (20 mg total) by Per G Tube route once daily. 90 tablet 3    glycopyrrolate (CUVPOSA) 1 mg/5 mL (0.2 mg/mL) Soln Take 5 mLs (1 mg total) by mouth 3 (three) times daily as needed (TO REDUCE SECRETIONS). 473 mL 1    acetaminophen (TYLENOL) 325 MG tablet 2 tablets (650 mg total) by Per G Tube route every 6 (six) hours.  0    albuterol-ipratropium (DUO-NEB) 2.5 mg-0.5 mg/3 mL nebulizer solution Take 3 mLs by nebulization every 4 (four) hours as needed for Wheezing. Rescue 75 mL 0    aspirin 81 MG Chew 1 tablet (81 mg total) by Per G Tube route once daily.  0    bisacodyL (DULCOLAX) 10 mg Supp Place 1 suppository (10 mg total) rectally daily as needed.  0    clopidogreL (PLAVIX) 75 mg tablet 1 tablet (75 mg total) by Per G Tube route once daily. 30 tablet 11    duke's soln (benadryl 30 mL, mylanta 30 mL, LIDOcaine 30 mL, nystatin 30 mL) 120mL Take 10 mLs by mouth 4 (four) times daily as needed (mucositis). 360 mL 0    enoxaparin (LOVENOX) 40 mg/0.4 mL Syrg Inject 0.4 mLs (40 mg total) into the skin once daily.      folic acid  (FOLVITE) 1 MG tablet 1 tablet (1 mg total) by Per G Tube route once daily.  0    guaiFENesin 200 mg/5 mL Liqd Take 400 mg by mouth every 4 (four) hours as needed (for secretions). 473 mL 3    losartan (COZAAR) 50 MG tablet 1 tablet (50 mg total) by Per G Tube route once daily. 90 tablet 3    melatonin (MELATIN) 3 mg tablet 2 tablets (6 mg total) by Per G Tube route nightly.  0    metoprolol tartrate (LOPRESSOR) 100 MG tablet 1 tablet (100 mg total) by Per G Tube route 2 (two) times daily. 60 tablet 11    ondansetron (ZOFRAN-ODT) 8 MG TbDL Take 1 tablet (8 mg total) by mouth every 8 (eight) hours as needed (nausea). 30 tablet 1    oxyCODONE (ROXICODONE) 5 mg/5 mL Soln 5 mLs (5 mg total) by Per G Tube route every 4 (four) hours as needed (Pain). 150 mL 0    polyethylene glycol (GLYCOLAX) 17 gram PwPk 17 g by Per G Tube route 2 (two) times daily.  0    senna-docusate 8.6-50 mg (PERICOLACE) 8.6-50 mg per tablet 1 tablet by Per G Tube route 2 (two) times daily.      sodium chloride (OCEAN) 0.65 % nasal spray 1 spray by Nasal route as needed for Congestion.  12    sodium chloride 3% 3 % nebulizer solution Take 4 mLs by nebulization every 8 (eight) hours.      thiamine 100 MG tablet 1 tablet (100 mg total) by Per G Tube route once daily.       Scheduled Medications:  albuterol-ipratropium, 3 mL, Nebulization, Q4H  aspirin, 81 mg, Per G Tube, Daily  atorvastatin, 20 mg, Per G Tube, Daily  clopidogreL, 75 mg, Per G Tube, Daily  enoxaparin, 40 mg, Subcutaneous, Daily  folic acid, 1 mg, Per G Tube, Daily  glycopyrrolate, 1 mg, Per G Tube, TID  melatonin, 6 mg, Per G Tube, Nightly  piperacillin-tazobactam (ZOSYN) IVPB, 4.5 g, Intravenous, Q8H  polyethylene glycol, 17 g, Per G Tube, BID  sodium chloride 3%, 4 mL, Nebulization, Q8H  thiamine, 100 mg, Per G Tube, Daily      PRN: sodium chloride, albuterol-ipratropium, bisacodyL, duke's soln (benadryl 30 mL, mylanta 30 mL, LIDOcaine 30 mL, nystatin 30 mL) 120 mL,  ondansetron, oxyCODONE, sodium chloride, sodium chloride 0.9%  Infusions:    dextrose 5 % 60 mL/hr at 04/25/22 1118     Estimated Creatinine Clearance: 82.3 mL/min (based on SCr of 0.7 mg/dL).    Assessment/Plan:      * Acute on chronic respiratory failure    Pt with SCC of the tongue s/p total glossectomy, chemowith cisplatin, and nearing completion of radiation with trach, COPD, asthma, HTN, CAD s/p PCI presenting for acute on chronic hypoxemic respiratory failure. He is on 5L with trach collar at home. He has had increased yellow secretions, work of breathing and SOB for the past several days. BNP elevated to 1600 on admission with pulmonary edema and new cardiomegaly on CXR. Trop wnl. Bicarb is elevated suggesting chronic hypercapnia. Suctioning has cleared thick mucous. He was able to be weaned down to his home O2, however after ~ 20 minutes he desats again, requiring deep suction with resolution of hypoxia. Now stable on home O2. Diuresed -3L        -Pt stable on home O2 for 24 hours, ready to stepdown to floor  - CAP coverage with ceftriaxone/azithro  - Methylprednisolone 40 mg x5 days for possible element of COPD exacerbation  - Duonebs q4  - Hypertonic saline nebs  - Chest PT  - Suction PRN    4/21 Transfer to hospital medicine . 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  initially requiring vent but has now been stable on home trach collar for 24 hours. sats 93% on 8L via trach collar.  Awaiting cultures from sputum, on CAP coverage with ceftriaxone. completed Azithromycin. continue solumedrol 40mg IV daily.   4/22 sats 95% on trach collar 10/ fio2 60% . completed azithromycin.   started on Zosyn for possible aspiration  repeat CX ray-in the inferior hemithorax on the left side consistent with airspace consolidation/volume loss in the left lower lobe is again appreciated, but the  lung zones are essentially stable since the prior exam and demonstrate no new areas of airspace consolidation or volume loss.  respiratory culture 4/19 with pansensitive pseudomonas.   4/25   oxygen tapered to 8L/ Fio2 35%        Aspiration pneumonia    4/22 sats 95% on trach collar 10/ fio2 60% . completed azithromycin.   started on Zosyn for possible aspiration  repeat CX ray-in the inferior hemithorax on the left side consistent with airspace consolidation/volume loss in the left lower lobe is again appreciated, but the lung zones are essentially stable since the prior exam and demonstrate no new areas of airspace consolidation or volume loss.  respiratory culture 4/19 with pseudomonas.           Dysphagia  Nutrition consulted. Most recent weight and BMI monitored-          Malnutrition (Moderate to Severe)  Weight Loss (Malnutrition): greater than 10% in 6 months                 Measurements:  Wt Readings from Last 1 Encounters:   04/20/22 61.9 kg (136 lb 7.4 oz)   Body mass index is 20.75 kg/m².    Recommendations: Recommendation/Intervention: 1.  Goals: Meet % EEN, EPN by RD f/u date    Patient has been screened and assessed by RD. RD will follow patient.    4/21 secondary to above. on G tube feedings   4/22 changed to bolus GT feedings.   4/23 intermittently refusing bolus GT feedings.      Hypokalemia  replaced       Hypernatremia  4/25 sodium 150. started on D5W . repeat renal panel in PM       Chronic respiratory failure with hypoxia, on home O2 therapy  secondary to COPD. on 5L oxygen  at home    Protein-calorie malnutrition  Nutrition consulted. Most recent weight and BMI monitored-          Malnutrition (Moderate to Severe)  Weight Loss (Malnutrition): greater than 10% in 6 months              Measurements:  Wt Readings from Last 1 Encounters:   04/20/22 61.9 kg (136 lb 7.4 oz)   Body mass index is 20.75 kg/m².    Recommendations: Recommendation/Intervention: 1.  Goals: Meet % EEN, EPN by RD  f/u date    Patient has been screened and assessed by RD. RD will follow patient.      Normocytic anemia    Patient's with Normocytic anemia.. Hemoglobin stable. Etiology likely due to chronic disease .  Current CBC reviewed-  Recent Labs   Lab 04/19/22  1442 04/20/22  0531 04/21/22  0333   HGB 6.5* 9.2* 9.8*     Monitor CBC and transfuse if H/H drops below 7/21.       Other hyperlipidemia  Continue home statin         Primary hypertension    Chronic, controlled.  Latest blood pressure and vitals reviewed-   Temp:  [96.9 °F (36.1 °C)-99.2 °F (37.3 °C)]   Pulse:  []   Resp:  [18-61]   BP: ()/(62-95)   SpO2:  [91 %-99 %] .   Home meds for hypertension were reviewed- amlodipine, losartan and metoprolol  held for now  PRN meds if BP> 180/110 mm HG      Coronary artery disease involving native coronary artery of native heart without angina pectoris    Continue ASA, plavix, and statin       Squamous cell cancer of tongue    12/15/21 FNA left neck mass : squamous cell carcinoma, p16 negative  1/4/22 total glossectomy, bilateral neck dissection, bilateral cervical facial advancement flaps and anterolateral thigh free flap reconstruction of his glossectomy defect.  Final path :  [id number] (+microscopic SALINAS, single contralateral node), superior soft tissue margin of left neck with tumor.  2/28/22 start chemoradiation  Finished chemo with cisplatin 4/6. Was going to complete final dose of radiation tomorrow.     -Rad/onc consulted for radiation while inpatient  4/22  s/p radiation oncology eval -on adjuvant chemoradiation, completed 31/33 fractions of RT.  renitiation of RT  inpatient when patient able to tolerate.    completed radiation        VTE Risk Mitigation (From admission, onward)         Ordered     enoxaparin injection 40 mg  Daily         04/19/22 1123     IP VTE HIGH RISK PATIENT  Once         04/19/22 1123     Place sequential compression device  Until discontinued         04/19/22 1123                 Discharge Planning   NISA: 4/28/2022     Code Status: Full Code   Is the patient medically ready for discharge?: No    Reason for patient still in hospital (select all that apply): Treatment  Discharge Plan A: Home Health                  Jordan Armenta MD  Department of Hospital Medicine   [facility] - Cardiac Medical ICU

## 2022-04-25 NOTE — PLAN OF CARE
Problem: Adult Inpatient Plan of Care  Goal: Absence of Hospital-Acquired Illness or Injury  Outcome: Ongoing, Progressing  Goal: Optimal Comfort and Wellbeing  Outcome: Ongoing, Progressing     Problem: Impaired Wound Healing  Goal: Optimal Wound Healing  Outcome: Ongoing, Progressing     Problem: Skin Injury Risk Increased  Goal: Skin Health and Integrity  Outcome: Ongoing, Progressing     Problem: Communication Impairment (Artificial Airway)  Goal: Effective Communication  Outcome: Ongoing, Progressing     Problem: Device-Related Complication Risk (Artificial Airway)  Goal: Optimal Device Function  Outcome: Ongoing, Progressing     Problem: Skin and Tissue Injury (Artificial Airway)  Goal: Absence of Device-Related Skin or Tissue Injury  Outcome: Ongoing, Progressing     Problem: Fall Injury Risk  Goal: Absence of Fall and Fall-Related Injury  Outcome: Ongoing, Progressing

## 2022-04-25 NOTE — ASSESSMENT & PLAN NOTE
4/25 sodium 150. started on D5W . repeat renal panel in PM   4/28 sodium trended down to 145 -> 140, on TFs

## 2022-04-25 NOTE — ASSESSMENT & PLAN NOTE
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 He is on 5L with trach collar at home. He has had increased yellow secretions, work of breathing and SOB for the past several days. BNP elevated to 1600 on admission with pulmonary edema and new cardiomegaly on CXR. Trop wnl. Bicarb is elevated suggesting chronic hypercapnia. Suctioning has cleared thick mucous. He was able to be weaned down to his home O2, however after ~ 20 minutes he desats again, requiring deep suction with resolution of hypoxia. Now stable on home O2. Diuresed -3L        -Pt stable on home O2 for 24 hours, ready to stepdown to floor  - CAP coverage with ceftriaxone/azithro  - Methylprednisolone 40 mg x5 days for possible element of COPD exacerbation  - Duonebs q4  - Hypertonic saline nebs  - Chest PT  - Suction PRN    4/21 Transfer to hospital medicine . 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  initially requiring vent but has now been stable on home trach collar for 24 hours. sats 93% on 8L via trach collar.  Awaiting cultures from sputum, on CAP coverage with ceftriaxone. completed Azithromycin. continue solumedrol 40mg IV daily.   4/22 sats 95% on trach collar 10/ fio2 60% . completed azithromycin.   started on Zosyn for possible aspiration  repeat CX ray-in the inferior hemithorax on the left side consistent with airspace consolidation/volume loss in the left lower lobe is again appreciated, but the lung zones are essentially stable since the prior exam and demonstrate no new areas of airspace consolidation or volume loss.  respiratory culture 4/19 with pseudomonas.     4/28-  oxygen tapered to 8L/ Fio2 35%

## 2022-04-25 NOTE — PHYSICIAN QUERY
PT Name: Fareed Richard Jr.  MR #: [id number]     DOCUMENTATION CLARIFICATION     CDS: Alex Puentes RN CCDS               Contact information: [email or web address]     This form is a permanent document in the medical record.    Query Date: April 25, 2022    By submitting this query, we are merely seeking further clarification of documentation.  Please utilize your independent clinical judgment when addressing the question(s) below.  The Medical Record contains the following:   Indicators   Supporting Clinical Findings Location in Medical Record     x Pneumonia documented Aspiration pneumonia 4/22/2022 Hospital Medicine progress notes     x Chest X-Ray/CT Scan Some opacity in the inferior hemithorax on the left side consistent with airspace consolidation/volume loss in the left lower lobe is again appreciated, but the lung zones are essentially stable since the prior exam and demonstrate no new areas of airspace consolidation or volume loss.  No pleural fluid of any substantial volume is seen on either side.  No pneumothorax.      New onset cardiomegaly and edema. This could be CHF less likely pneumonia. 4/22/2022 Chest x-ray            4/19/2022 Chest x-ray     x PaO2    PaCO2     O2 sat O2 sats 91-95% on 15L O2     04/19/22 09:33   POC PH 7.232 (L)   POC PCO2 101.5 (H)   POC PO2 16 (L)   POC BE 15   POC HCO3 42.7 (H)   POC SATURATED O2 14 (L)   POC TCO2 46 (H)   FiO2 40   Flow 15   Sample VENOUS   DelSys T-Collar   Allens Test N/A   Site Other   Mode SPONT    4/19/2022 Vitals    VBGs     x WBC  04/19/22 14:42   WBC 3.67 (L)    Lab value     x Vital Signs T 97.7-99.8 (axillary), HR , RR 16-26, SBP 83/122/DBP 53-60 4/19/2022 Vitals     x Cultures  Sputum culture is positive for PSEUDOMONAS AERUGINOSA, Rare    Susceptibility     Pseudomonas aeruginosa    CULTURE, RESPIRATORY     Cefepime <=2 mcg/mL Sensitive    Piperacillin/Tazo <=16 mcg/mL Sensitive 4/19/2022 Microbiology report     x Treatment  azithromycin  500 mg IVPB q24h  cefepime 2 g IVPB x 1  vancomycin 1.25 g IVPB x 1  cefTRIAXone (ROCEPHIN) 2 g IVPB q24h  piperacillin-tazobactam 4.5 g IVPB q8h 4/19-4/21/2022 Medication  4/19/2022 Medications    4/20-4/21/2022 Medication  4/22/2022- Current Medication     x Supplemental O2 Supplemental O2 6-15L then 40% FIO2 via mechanical ventilator 4/19/2022 Vitals     x Dysphagia/Swallow study Dysphagia 4/22/2022 Hospital Medicine progress notes     x Other pmh of squamous cell carcinoma of the tongue s/p total glossectomy and flap with tracheostomy. Wife also notes increased yellow thick secretions from trach site. Suctioned with return of copious secretions.     04/19/22 09:25   Procalcitonin 0.09     4/19/2022 Critical Care H&P        Lab value     Provider, please clarify the diagnosis of Aspiration pneumonia related to the above clinical indicators:    [   ] Aspiration Pneumonia/Pneumonitis only   [  x ] Aspiration Pneumonia/Pneumonitis with a Superimposed Pseudomonas Pneumonia   [   ] Other type of pneumonia (please specify): ________   [  ] Clinically undetermined         Please document in your progress notes daily for the duration of treatment, until resolved, and include in your discharge summary.     Form No. [id number]

## 2022-04-25 NOTE — RESPIRATORY THERAPY
RAPID RESPONSE RESPIRATORY THERAPY PROACTIVE NOTE           Time of visit:750    Code Status: Full Code   : 1950  Bed: 8094/8094 A:   MRN: [id number]  Time spent at the bedside: < 15 min    SITUATION    Evaluated patient for: LDA Check     BACKGROUND    Patient has a past medical history of Cancer, COPD (chronic obstructive pulmonary disease), Hyperlipidemia, Hypertension, and Pseudoaneurysm.  Clinically Significant Surgical Hx: tracheostomy    24 Hours Vitals Range:  Temp:  [97.6 °F (36.4 °C)-98.3 °F (36.8 °C)]   Pulse:  []   Resp:  [17-26]   BP: (114-181)/(63-82)   SpO2:  [90 %-100 %]     Labs:    Recent Labs     22  0312 22  0406 22  0204    144 150*   K 4.3 3.0* 4.2   CL 93* 98 103   CO2 37* 37* 37*   CREATININE 0.8 0.7 0.7   * 145* 117*   PHOS 4.2 2.6* 3.3   MG 2.3 2.2 2.2        No results for input(s): PH, PCO2, PO2, HCO3, POCSATURATED, BE in the last 72 hours.    ASSESSMENT/INTERVENTIONS    Upon arrival in room all supplies at bedside including a 6.0 and 4.0 cuffed shiley    Last VS   Temp: 98 °F (36.7 °C) ( 0743)  Pulse: 104 ( 0854)  Resp: 22 ( 0854)  BP: 138/82 ( 0743)  SpO2: 100 % ( 0743)    Level of Consciousness: Level of Consciousness (AVPU): alert  Respiratory Effort: Respiratory Effort: Unlabored Expansion/Accessory Muscle Usage: Expansion/Accessory Muscles/Retractions: no retractions, no use of accessory muscles, expansion symmetric  All Lung Field Breath Sounds: All Lung Fields Breath Sounds: diminished  DEE Breath Sounds: coarse  LLL Breath Sounds: diminished  RUL Breath Sounds: coarse  RML Breath Sounds: diminished  RLL Breath Sounds: diminished  O2 Device/Concentration: Flow (L/min): 10, Oxygen Concentration (%): 60, O2 Device (Oxygen Therapy): tracheostomy collar  Surgical airway: Yes, Type: Shiley Size: 6   Ambu at bedside: Ambu bag with the patient?: Yes, Adult Ambu     Active Orders   Respiratory Care    Chest physiotherapy  TID     Frequency: TID     Number of Occurrences: Until Specified     Order Questions:      Indications: COPIOUS SPUTUM PRODUCTION      Indications: ATELECTASIS PROPHYLAXIS      Indications: ATELECTASIS NONRESPONSIVE TO TX    Inhalation Treatment Q4H     Frequency: Q4H     Number of Occurrences: Until Specified    Oxygen Continuous     Frequency: Continuous     Number of Occurrences: Until Specified     Order Questions:      Device type: Low flow      Device: Trach Collar (Comment: 8L)      FiO2%: 35%      Titrate O2 per Oxygen Titration Protocol: Yes      To maintain SpO2 goal of: >= 90%      Notify MD of: Inability to achieve desired SpO2; Sudden change in patient status and requires 20% increase in FiO2; Patient requires >60% FiO2    Pulse Oximetry Continuous     Frequency: Continuous     Number of Occurrences: Until Specified    Routine tracheostomy care     Frequency: BID     Number of Occurrences: Until Specified       RECOMMENDATIONS    We recommend: RRT Recs: Continue POC per primary team.    ESCALATION        FOLLOW-UP    Please call back the Rapid Response RT, Su Avendano RRT at x [phone] for any questions or concerns.

## 2022-04-26 LAB
ALBUMIN SERPL BCP-MCNC: 2.6 G/DL (ref 3.5–5.2)
ALBUMIN SERPL BCP-MCNC: 2.7 G/DL (ref 3.5–5.2)
ALP SERPL-CCNC: 73 U/L (ref 55–135)
ALP SERPL-CCNC: 73 U/L (ref 55–135)
ALT SERPL W/O P-5'-P-CCNC: 18 U/L (ref 10–44)
ALT SERPL W/O P-5'-P-CCNC: 18 U/L (ref 10–44)
ANION GAP SERPL CALC-SCNC: 12 MMOL/L (ref 8–16)
ANION GAP SERPL CALC-SCNC: 7 MMOL/L (ref 8–16)
AST SERPL-CCNC: 13 U/L (ref 10–40)
AST SERPL-CCNC: 16 U/L (ref 10–40)
BASOPHILS # BLD AUTO: 0.02 K/UL (ref 0–0.2)
BASOPHILS # BLD AUTO: 0.03 K/UL (ref 0–0.2)
BASOPHILS NFR BLD: 0.4 % (ref 0–1.9)
BASOPHILS NFR BLD: 0.6 % (ref 0–1.9)
BILIRUB SERPL-MCNC: 0.3 MG/DL (ref 0.1–1)
BILIRUB SERPL-MCNC: 0.3 MG/DL (ref 0.1–1)
BUN SERPL-MCNC: 19 MG/DL (ref 8–23)
BUN SERPL-MCNC: 20 MG/DL (ref 8–23)
CALCIUM SERPL-MCNC: 9 MG/DL (ref 8.7–10.5)
CALCIUM SERPL-MCNC: 9.2 MG/DL (ref 8.7–10.5)
CHLORIDE SERPL-SCNC: 100 MMOL/L (ref 95–110)
CHLORIDE SERPL-SCNC: 101 MMOL/L (ref 95–110)
CO2 SERPL-SCNC: 32 MMOL/L (ref 23–29)
CO2 SERPL-SCNC: 36 MMOL/L (ref 23–29)
CREAT SERPL-MCNC: 0.7 MG/DL (ref 0.5–1.4)
CREAT SERPL-MCNC: 0.7 MG/DL (ref 0.5–1.4)
DIFFERENTIAL METHOD: ABNORMAL
DIFFERENTIAL METHOD: ABNORMAL
EOSINOPHIL # BLD AUTO: 0 K/UL (ref 0–0.5)
EOSINOPHIL # BLD AUTO: 0 K/UL (ref 0–0.5)
EOSINOPHIL NFR BLD: 0.6 % (ref 0–8)
EOSINOPHIL NFR BLD: 0.7 % (ref 0–8)
ERYTHROCYTE [DISTWIDTH] IN BLOOD BY AUTOMATED COUNT: 16.7 % (ref 11.5–14.5)
ERYTHROCYTE [DISTWIDTH] IN BLOOD BY AUTOMATED COUNT: 16.9 % (ref 11.5–14.5)
EST. GFR  (AFRICAN AMERICAN): >60 ML/MIN/1.73 M^2
EST. GFR  (AFRICAN AMERICAN): >60 ML/MIN/1.73 M^2
EST. GFR  (NON AFRICAN AMERICAN): >60 ML/MIN/1.73 M^2
EST. GFR  (NON AFRICAN AMERICAN): >60 ML/MIN/1.73 M^2
GLUCOSE SERPL-MCNC: 114 MG/DL (ref 70–110)
GLUCOSE SERPL-MCNC: 144 MG/DL (ref 70–110)
HCT VFR BLD AUTO: 34.3 % (ref 40–54)
HCT VFR BLD AUTO: 34.9 % (ref 40–54)
HGB BLD-MCNC: 10 G/DL (ref 14–18)
HGB BLD-MCNC: 10.3 G/DL (ref 14–18)
IMM GRANULOCYTES # BLD AUTO: 0.03 K/UL (ref 0–0.04)
IMM GRANULOCYTES # BLD AUTO: 0.04 K/UL (ref 0–0.04)
IMM GRANULOCYTES NFR BLD AUTO: 0.7 % (ref 0–0.5)
IMM GRANULOCYTES NFR BLD AUTO: 0.8 % (ref 0–0.5)
LYMPHOCYTES # BLD AUTO: 0.4 K/UL (ref 1–4.8)
LYMPHOCYTES # BLD AUTO: 0.4 K/UL (ref 1–4.8)
LYMPHOCYTES NFR BLD: 8.2 % (ref 18–48)
LYMPHOCYTES NFR BLD: 9.2 % (ref 18–48)
MAGNESIUM SERPL-MCNC: 2 MG/DL (ref 1.6–2.6)
MAGNESIUM SERPL-MCNC: 2 MG/DL (ref 1.6–2.6)
MCH RBC QN AUTO: 28.7 PG (ref 27–31)
MCH RBC QN AUTO: 29 PG (ref 27–31)
MCHC RBC AUTO-ENTMCNC: 29.2 G/DL (ref 32–36)
MCHC RBC AUTO-ENTMCNC: 29.5 G/DL (ref 32–36)
MCV RBC AUTO: 98 FL (ref 82–98)
MCV RBC AUTO: 98 FL (ref 82–98)
MONOCYTES # BLD AUTO: 0.4 K/UL (ref 0.3–1)
MONOCYTES # BLD AUTO: 0.6 K/UL (ref 0.3–1)
MONOCYTES NFR BLD: 10.9 % (ref 4–15)
MONOCYTES NFR BLD: 9 % (ref 4–15)
NEUTROPHILS # BLD AUTO: 3.7 K/UL (ref 1.8–7.7)
NEUTROPHILS # BLD AUTO: 4.1 K/UL (ref 1.8–7.7)
NEUTROPHILS NFR BLD: 78.9 % (ref 38–73)
NEUTROPHILS NFR BLD: 80 % (ref 38–73)
NRBC BLD-RTO: 0 /100 WBC
NRBC BLD-RTO: 0 /100 WBC
PHOSPHATE SERPL-MCNC: 2.8 MG/DL (ref 2.7–4.5)
PHOSPHATE SERPL-MCNC: 2.8 MG/DL (ref 2.7–4.5)
PLATELET # BLD AUTO: 247 K/UL (ref 150–450)
PLATELET # BLD AUTO: 260 K/UL (ref 150–450)
PMV BLD AUTO: 8.8 FL (ref 9.2–12.9)
PMV BLD AUTO: 8.9 FL (ref 9.2–12.9)
POTASSIUM SERPL-SCNC: 3.7 MMOL/L (ref 3.5–5.1)
POTASSIUM SERPL-SCNC: 3.8 MMOL/L (ref 3.5–5.1)
PROT SERPL-MCNC: 5.6 G/DL (ref 6–8.4)
PROT SERPL-MCNC: 6.2 G/DL (ref 6–8.4)
RBC # BLD AUTO: 3.49 M/UL (ref 4.6–6.2)
RBC # BLD AUTO: 3.55 M/UL (ref 4.6–6.2)
SODIUM SERPL-SCNC: 143 MMOL/L (ref 136–145)
SODIUM SERPL-SCNC: 145 MMOL/L (ref 136–145)
WBC # BLD AUTO: 4.57 K/UL (ref 3.9–12.7)
WBC # BLD AUTO: 5.24 K/UL (ref 3.9–12.7)

## 2022-04-26 PROCEDURE — 84100 ASSAY OF PHOSPHORUS: CPT

## 2022-04-26 PROCEDURE — 20600001 HC STEP DOWN PRIVATE ROOM

## 2022-04-26 PROCEDURE — 99900026 HC AIRWAY MAINTENANCE (STAT)

## 2022-04-26 PROCEDURE — 99900035 HC TECH TIME PER 15 MIN (STAT)

## 2022-04-26 PROCEDURE — 36415 COLL VENOUS BLD VENIPUNCTURE: CPT | Performed by: HOSPITALIST

## 2022-04-26 PROCEDURE — 94668 MNPJ CHEST WALL SBSQ: CPT

## 2022-04-26 PROCEDURE — 80053 COMPREHEN METABOLIC PANEL: CPT

## 2022-04-26 PROCEDURE — 25000242 PHARM REV CODE 250 ALT 637 W/ HCPCS: Performed by: HOSPITALIST

## 2022-04-26 PROCEDURE — 84100 ASSAY OF PHOSPHORUS: CPT | Mod: 91 | Performed by: HOSPITALIST

## 2022-04-26 PROCEDURE — 94640 AIRWAY INHALATION TREATMENT: CPT

## 2022-04-26 PROCEDURE — 25000003 PHARM REV CODE 250: Performed by: HOSPITALIST

## 2022-04-26 PROCEDURE — 25000003 PHARM REV CODE 250: Performed by: INTERNAL MEDICINE

## 2022-04-26 PROCEDURE — 63600175 PHARM REV CODE 636 W HCPCS: Performed by: INTERNAL MEDICINE

## 2022-04-26 PROCEDURE — 36415 COLL VENOUS BLD VENIPUNCTURE: CPT

## 2022-04-26 PROCEDURE — 83735 ASSAY OF MAGNESIUM: CPT | Mod: 91 | Performed by: HOSPITALIST

## 2022-04-26 PROCEDURE — 27000221 HC OXYGEN, UP TO 24 HOURS

## 2022-04-26 PROCEDURE — 80053 COMPREHEN METABOLIC PANEL: CPT | Mod: 91 | Performed by: HOSPITALIST

## 2022-04-26 PROCEDURE — 94761 N-INVAS EAR/PLS OXIMETRY MLT: CPT

## 2022-04-26 PROCEDURE — 85025 COMPLETE CBC W/AUTO DIFF WBC: CPT | Mod: 91 | Performed by: STUDENT IN AN ORGANIZED HEALTH CARE EDUCATION/TRAINING PROGRAM

## 2022-04-26 PROCEDURE — 25000242 PHARM REV CODE 250 ALT 637 W/ HCPCS: Performed by: INTERNAL MEDICINE

## 2022-04-26 PROCEDURE — 99232 SBSQ HOSP IP/OBS MODERATE 35: CPT | Mod: ,,, | Performed by: HOSPITALIST

## 2022-04-26 PROCEDURE — 27200966 HC CLOSED SUCTION SYSTEM

## 2022-04-26 PROCEDURE — 83735 ASSAY OF MAGNESIUM: CPT

## 2022-04-26 PROCEDURE — 63600175 PHARM REV CODE 636 W HCPCS: Performed by: HOSPITALIST

## 2022-04-26 PROCEDURE — 85025 COMPLETE CBC W/AUTO DIFF WBC: CPT | Performed by: HOSPITALIST

## 2022-04-26 PROCEDURE — 99232 PR SUBSEQUENT HOSPITAL CARE,LEVL II: ICD-10-PCS | Mod: ,,, | Performed by: HOSPITALIST

## 2022-04-26 PROCEDURE — 25000003 PHARM REV CODE 250: Performed by: STUDENT IN AN ORGANIZED HEALTH CARE EDUCATION/TRAINING PROGRAM

## 2022-04-26 RX ORDER — GLYCOPYRROLATE 1 MG/5ML
1 SOLUTION ORAL 3 TIMES DAILY PRN
Status: DISCONTINUED | OUTPATIENT
Start: 2022-04-26 | End: 2022-04-29

## 2022-04-26 RX ADMIN — ATORVASTATIN CALCIUM 20 MG: 20 TABLET, FILM COATED ORAL at 09:04

## 2022-04-26 RX ADMIN — IPRATROPIUM BROMIDE AND ALBUTEROL SULFATE 3 ML: 2.5; .5 SOLUTION RESPIRATORY (INHALATION) at 04:04

## 2022-04-26 RX ADMIN — IPRATROPIUM BROMIDE AND ALBUTEROL SULFATE 3 ML: 2.5; .5 SOLUTION RESPIRATORY (INHALATION) at 12:04

## 2022-04-26 RX ADMIN — IPRATROPIUM BROMIDE AND ALBUTEROL SULFATE 3 ML: 2.5; .5 SOLUTION RESPIRATORY (INHALATION) at 03:04

## 2022-04-26 RX ADMIN — PIPERACILLIN SODIUM AND TAZOBACTAM SODIUM 4.5 G: 4; .5 INJECTION, POWDER, FOR SOLUTION INTRAVENOUS at 11:04

## 2022-04-26 RX ADMIN — PIPERACILLIN SODIUM AND TAZOBACTAM SODIUM 4.5 G: 4; .5 INJECTION, POWDER, FOR SOLUTION INTRAVENOUS at 04:04

## 2022-04-26 RX ADMIN — IPRATROPIUM BROMIDE AND ALBUTEROL SULFATE 3 ML: 2.5; .5 SOLUTION RESPIRATORY (INHALATION) at 08:04

## 2022-04-26 RX ADMIN — ASPIRIN 81 MG CHEWABLE TABLET 81 MG: 81 TABLET CHEWABLE at 09:04

## 2022-04-26 RX ADMIN — GLYCOPYRROLATE 1 MG: 1 LIQUID ORAL at 08:04

## 2022-04-26 RX ADMIN — ENOXAPARIN SODIUM 40 MG: 40 INJECTION SUBCUTANEOUS at 05:04

## 2022-04-26 RX ADMIN — FOLIC ACID 1 MG: 1 TABLET ORAL at 09:04

## 2022-04-26 RX ADMIN — SODIUM CHLORIDE SOLN NEBU 3% 4 ML: 3 NEBU SOLN at 04:04

## 2022-04-26 RX ADMIN — MELATONIN TAB 3 MG 6 MG: 3 TAB at 08:04

## 2022-04-26 RX ADMIN — CLOPIDOGREL 75 MG: 75 TABLET, FILM COATED ORAL at 09:04

## 2022-04-26 RX ADMIN — SODIUM CHLORIDE SOLN NEBU 3% 4 ML: 3 NEBU SOLN at 07:04

## 2022-04-26 RX ADMIN — GLYCOPYRROLATE 1 MG: 1 LIQUID ORAL at 09:04

## 2022-04-26 RX ADMIN — IPRATROPIUM BROMIDE AND ALBUTEROL SULFATE 3 ML: 2.5; .5 SOLUTION RESPIRATORY (INHALATION) at 10:04

## 2022-04-26 RX ADMIN — POLYETHYLENE GLYCOL 3350 17 G: 17 POWDER, FOR SOLUTION ORAL at 09:04

## 2022-04-26 RX ADMIN — SODIUM CHLORIDE SOLN NEBU 3% 4 ML: 3 NEBU SOLN at 10:04

## 2022-04-26 RX ADMIN — THIAMINE HCL TAB 100 MG 100 MG: 100 TAB at 09:04

## 2022-04-26 RX ADMIN — IPRATROPIUM BROMIDE AND ALBUTEROL SULFATE 3 ML: 2.5; .5 SOLUTION RESPIRATORY (INHALATION) at 07:04

## 2022-04-26 RX ADMIN — PIPERACILLIN SODIUM AND TAZOBACTAM SODIUM 4.5 G: 4; .5 INJECTION, POWDER, FOR SOLUTION INTRAVENOUS at 08:04

## 2022-04-26 NOTE — NURSING
Pt noted with thick yellow secretions from trache. Intermittently suctioned using sterile technique with positive return. De-sat of 87% noted with increase to 91% post suction. Resp therapy at bedside at this time for scheduled nebulizer tx. Will change dressing to trache post nebulizer tx.

## 2022-04-26 NOTE — RESPIRATORY THERAPY
RAPID RESPONSE RESPIRATORY THERAPY PROACTIVE NOTE           Time of visit: 730    Code Status: Full Code   : 1950  Bed: 8094/8094 A:   MRN: [id number]  Time spent at the bedside: < 15 min    SITUATION    Evaluated patient for: LDA Check     BACKGROUND    Patient has a past medical history of Cancer, COPD (chronic obstructive pulmonary disease), Hyperlipidemia, Hypertension, and Pseudoaneurysm.  Clinically Significant Surgical Hx: tracheostomy    24 Hours Vitals Range:  Temp:  [97.3 °F (36.3 °C)-98.2 °F (36.8 °C)]   Pulse:  []   Resp:  [18-46]   BP: (106-153)/(65-83)   SpO2:  [62 %-98 %]     Labs:    Recent Labs     22  0406 22  0204 22  1744    150* 145  145   K 3.0* 4.2 3.2*  3.2*   CL 98 103 101  101   CO2 37* 37* 34*  34*   CREATININE 0.7 0.7 0.8  0.8   * 117* 225*  225*   PHOS 2.6* 3.3 2.7  2.7   MG 2.2 2.2  --         No results for input(s): PH, PCO2, PO2, HCO3, POCSATURATED, BE in the last 72 hours.    ASSESSMENT/INTERVENTIONS    Upon arrival in room all supplies at bedside including a 6.0 and 4.0 cuffed shiley    Last VS   Temp: 97.5 °F (36.4 °C) (342)  Pulse: 94 (0745)  Resp: 18 (745)  BP: 148/70 (342)  SpO2: 96 % (745)    Level of Consciousness: Level of Consciousness (AVPU): alert  Respiratory Effort: Respiratory Effort: Normal Expansion/Accessory Muscle Usage: Expansion/Accessory Muscles/Retractions: no retractions  All Lung Field Breath Sounds: All Lung Fields Breath Sounds: Anterior:, diminished, coarse  DEE Breath Sounds: coarse  LLL Breath Sounds: diminished  RUL Breath Sounds: coarse  RML Breath Sounds: diminished  RLL Breath Sounds: diminished  O2 Device/Concentration: Flow (L/min): 8, Oxygen Concentration (%): 35, O2 Device (Oxygen Therapy): Trach Collar  Surgical airway: Yes, Type: Shiley Size: 6, uncuffed  Ambu at bedside: Ambu bag with the patient?: Yes, Adult Ambu     Active Orders   Respiratory Care    Chest  physiotherapy TID     Frequency: TID     Number of Occurrences: Until Specified     Order Questions:      Indications: COPIOUS SPUTUM PRODUCTION      Indications: ATELECTASIS PROPHYLAXIS      Indications: ATELECTASIS NONRESPONSIVE TO TX    Inhalation Treatment Q4H     Frequency: Q4H     Number of Occurrences: Until Specified    Oxygen Continuous     Frequency: Continuous     Number of Occurrences: Until Specified     Order Questions:      Device type: Low flow      Device: Trach Collar (Comment: 8L)      FiO2%: 35%      Titrate O2 per Oxygen Titration Protocol: Yes      To maintain SpO2 goal of: >= 90%      Notify MD of: Inability to achieve desired SpO2; Sudden change in patient status and requires 20% increase in FiO2; Patient requires >60% FiO2    Pulse Oximetry Continuous     Frequency: Continuous     Number of Occurrences: Until Specified    Routine tracheostomy care     Frequency: BID     Number of Occurrences: Until Specified       RECOMMENDATIONS    We recommend: RRT Recs: Continue POC per primary team.    ESCALATION        FOLLOW-UP    Please call back the Rapid Response RT, Su Avendano RRT at x [phone] for any questions or concerns.

## 2022-04-26 NOTE — AI DETERIORATION ALERT
"RAPID RESPONSE NURSE AI ALERT       AI alert received.    Chart Reviewed: 04/26/2022, 2:49 PM    MRN: [id number]  Bed: 8094/8094 A    Dx: Acute on chronic respiratory failure    Fareed Richard Jr. has a past medical history of Cancer, COPD (chronic obstructive pulmonary disease), Hyperlipidemia, Hypertension, and Pseudoaneurysm.    Last VS: /70   Pulse 98   Temp 97 °F (36.1 °C)   Resp 18   Ht 5' 8" (1.727 m)   Wt 61.9 kg (136 lb 7.4 oz)   SpO2 (!) 92%   BMI 20.75 kg/m²     24H Vital Sign Range:  Temp:  [97 °F (36.1 °C)-98.2 °F (36.8 °C)]   Pulse:  []   Resp:  [18-46]   BP: (111-153)/(65-83)   SpO2:  [62 %-98 %]     Level of Consciousness (AVPU): alert    Recent Labs     04/25/22  0204 04/26/22  0711 04/26/22  0946   WBC 6.01 5.24 4.57   HGB 10.3* 10.0* 10.3*   HCT 35.7* 34.3* 34.9*    260 247       Recent Labs     04/25/22  0204 04/25/22  1744 04/26/22  0711 04/26/22  0946   * 145  145 143 145   K 4.2 3.2*  3.2* 3.8 3.7    101  101 100 101   CO2 37* 34*  34* 36* 32*   CREATININE 0.7 0.8  0.8 0.7 0.7   * 225*  225* 114* 144*   PHOS 3.3 2.7  2.7 2.8 2.8   MG 2.2  --  2.0 2.0        No results for input(s): PH, PCO2, PO2, HCO3, POCSATURATED, BE in the last 72 hours.     OXYGEN:  Flow (L/min): 8  Oxygen Concentration (%): 35  O2 Device (Oxygen Therapy): Trach Collar    MEWS score: 2    charge RN, Cecille contacted. No concerns verbalized at this time. Instructed to call [phone] for further concerns or assistance.    Ruth Ann Jerez RN        "

## 2022-04-26 NOTE — PLAN OF CARE
Problem: Adult Inpatient Plan of Care  Goal: Plan of Care Review  Outcome: Ongoing, Not Progressing  Goal: Patient-Specific Goal (Individualized)  Outcome: Ongoing, Not Progressing  Goal: Absence of Hospital-Acquired Illness or Injury  Outcome: Ongoing, Not Progressing  Goal: Optimal Comfort and Wellbeing  Outcome: Ongoing, Not Progressing  Goal: Readiness for Transition of Care  Outcome: Ongoing, Not Progressing     Problem: Impaired Wound Healing  Goal: Optimal Wound Healing  Outcome: Ongoing, Not Progressing     Problem: Skin Injury Risk Increased  Goal: Skin Health and Integrity  Outcome: Ongoing, Not Progressing     Problem: Communication Impairment (Artificial Airway)  Goal: Effective Communication  Outcome: Ongoing, Not Progressing     Problem: Device-Related Complication Risk (Artificial Airway)  Goal: Optimal Device Function  Outcome: Ongoing, Not Progressing     Problem: Skin and Tissue Injury (Artificial Airway)  Goal: Absence of Device-Related Skin or Tissue Injury  Outcome: Ongoing, Not Progressing     Problem: Noninvasive Ventilation Acute  Goal: Effective Unassisted Ventilation and Oxygenation  Outcome: Ongoing, Not Progressing     Problem: Fall Injury Risk  Goal: Absence of Fall and Fall-Related Injury  Outcome: Ongoing, Not Progressing     Pt sitting upright in bed with o2 infusing as directed - 8L/35%/sat 89%. No distress present. Unable to verbally communicate d/t hx CA tongue requiring surgical removal. Uses written communication for expressive communication. Trache in place, uncuffed with small amount drainage noted. Cleaned using sterile technique. No suctioning required thus far this shift. Lung sounds diminished, able to cough on demand but non-productive in oral cavity. Some secretion to trache when cough. Non-purulent. Pt afebrile. Moves BUE/BLE with no problems. No skin concerns present. Peg tube in place to LUQ abdomen. Isosource 1.5 administered via bolus. Juan feeding and flushes well.  Small loose BM noted this am. Urinal at bedside et pt uses appropriately. Cont B&B. Lab ret with decrease in sodium to 143 - trending downward where previously noted to be hypernatremic.

## 2022-04-26 NOTE — AI DETERIORATION ALERT
"RAPID RESPONSE NURSE AI ALERT       AI alert received.    Chart Reviewed: 04/25/2022, 11:19 PM    MRN: [id number]  Bed: 8094/8094 A    Dx: Acute on chronic respiratory failure    Fareed Richard Jr. has a past medical history of Cancer, COPD (chronic obstructive pulmonary disease), Hyperlipidemia, Hypertension, and Pseudoaneurysm.    Last VS: /77   Pulse 92   Temp 97.6 °F (36.4 °C) (Oral)   Resp (!) 26   Ht 5' 8" (1.727 m)   Wt 61.9 kg (136 lb 7.4 oz)   SpO2 (!) 94%   BMI 20.75 kg/m²     24H Vital Sign Range:  Temp:  [97.6 °F (36.4 °C)-98.2 °F (36.8 °C)]   Pulse:  []   Resp:  [17-26]   BP: (106-181)/(65-82)   SpO2:  [90 %-100 %]     Level of Consciousness (AVPU): alert    Recent Labs     04/23/22  0312 04/24/22  0406 04/25/22  0204   WBC 5.00 7.49 6.01   HGB 10.5* 9.9* 10.3*   HCT 34.1* 33.3* 35.7*    296 287       Recent Labs     04/23/22  0312 04/24/22  0406 04/25/22  0204 04/25/22  1744    144 150* 145  145   K 4.3 3.0* 4.2 3.2*  3.2*   CL 93* 98 103 101  101   CO2 37* 37* 37* 34*  34*   CREATININE 0.8 0.7 0.7 0.8  0.8   * 145* 117* 225*  225*   PHOS 4.2 2.6* 3.3 2.7  2.7   MG 2.3 2.2 2.2  --         No results for input(s): PH, PCO2, PO2, HCO3, POCSATURATED, BE in the last 72 hours.     OXYGEN:  Flow (L/min): 8  Oxygen Concentration (%): 35  O2 Device (Oxygen Therapy): Trach Collar    MEWS score: 3    Bedside RNRupali contacted. No concerns verbalized at this time. Instructed to call [phone] for further concerns or assistance. Erroneous un-filed O2 sat. Currently 94% 8L 35% TC, no concerns    Heidi Good RN        "

## 2022-04-26 NOTE — NURSING
Pt noted with saturation 83%, face reddening with coughing exacerbation. Mucous plug visible in trache opening, pt unable to expel. Suction x2 provided via sterile technique w/ relief of plug. O2 sat increased to 93%, typical for this pt while on 8L at 35%. No resp distress noted at present

## 2022-04-26 NOTE — PROGRESS NOTES
[facility] - Cardiac Medical ICU  [facility] Medicine  Progress Note    Patient Name: Fareed Richard Jr.  MRN: [id number]  Patient Class: IP- Inpatient   Admission Date: 4/19/2022  Length of Stay: 7 days  Attending Physician: Jordan Armenta MD  Primary Care Provider: Kodi Tubbs MD        Subjective:     Principal Problem:Acute on chronic respiratory failure        HPI:  Mr. 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 Per wife he has had several days of increasing lethargy, increased work of breathing and secretions. Yesterday she became particularly concerned when he became slightly less responsive and interactive. Wife also notes increased yellow thick secretions from trach site. Upon EMS arrival he was on his home 5 L by trach collar and saturating 93% with significant wheezing.  He received 1 DuoNeb by them.  He was also suctioned.     Currently patient is alert, following commands.  He is responding to yes no questions.  He denies any chest pain but does endorse shortness of breath and cough.  He denies any abdominal pain or pain elsewhere       ED course:   He was given 500ccs IVF as well as one time doses of vanc/cefepime/azithro. Labs significant for BNP of 1600, WBC 5k, K 5.6, Bicarb 35, VBG 7.26/87/25/40. Trop wnl. CXR with new cardiomegaly and pulmonary edema. Suctioned with return of copious secretions. ICU was consulted for acute hypoxemic respiratory failure and he will be admitted for further management.              Overview/Hospital Course:    Admitted to ICU, started on CAP coverage and solumedrol. Quickly weaned off vent after return of copious secretions on suction. Has been maintaining sats well on home O2. Rad/onc consulted for completion of radiology while inpatient. Pending culture sensitivity.    4/21 Transfer to hospital  medicine . 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  initially requiring vent but has now been stable on home trach collar for 24 hours. sats 93% on 8L via trach collar.  Awaiting cultures from sputum, on CAP coverage with ceftriaxone. completed Azithromycin. continue solumedrol 40mg IV daily.  rad/onc consulted this morning-he was supposed to complete radiation outpt but was admitted, attempting to set up for inpatient  4/22 changed to bolus GT feedings. s/p radiation oncology eval -on adjuvant chemoradiation, completed 31/33 fractions of RT.  renitiation of RT  inpatient when patient able to tolerate. sats 95% on trach collar 10/ fio2 60% . completed azithromycin.  started on Zosyn for possible aspiration  repeat CX ray-in the inferior hemithorax on the left side consistent with airspace consolidation/volume loss in the left lower lobe is again appreciated, but the lung zones are essentially stable since the prior exam and demonstrate no new areas of airspace consolidation or volume loss. respiratory culture 4/19 with pansensitive  pseudomonas.     4/23 intermittently refusing bolus GT feedings.stable on 10L/60% Fio2 . completed radiation sessions. off solumedrol  today. PT recs SNF  4/24 K and P replaced   4/25 sodium 150. started on D5W . repeat renal panel in PM . oxygen tapered to 8L/ Fio2 35%   4/26 K of 3.2  replaced            Review of Systems:   Pain scale:   Constitutional:  fever,  chills, headache, vision loss, hearing loss, weight loss, Generalized weakness, falls, loss of smell, loss of taste, poor appetite,  sore throat, voice change,immunocompromised state.   Respiratory: cough, shortness of breath.   Cardiovascular: chest pain, dizziness, palpitations, orthopnea, swelling of feet, syncope  Gastrointestinal: nausea, vomiting, abdominal pain, diarrhea, black stool,  blood in  stool, change in bowel habits  Genitourinary: hematuria, dysuria, urgency, frequency  Integument/Breast: rash,  pruritis  Hematologic/Lymphatic: easy bruising, lymphadenopathy  Musculoskeletal: arthralgias , myalgias, back pain, neck pain, knee pain  Neurological: confusion, seizures, tremors, slurred speech  Behavioral/Psych:  depression, anxiety, auditory or visual hallucinations     OBJECTIVE:     Physical Exam:  Body mass index is 20.75 kg/m².    Constitutional: Appears well-developed and well-nourished. thin built   Head: Normocephalic and atraumatic.   Neck: Normal range of motion. Neck supple. trach collar  Cardiovascular: Normal heart rate.  Regular heart rhythm.  Pulmonary/Chest: Effort normal.   Abdominal: No distension.  No tenderness, PEG tube insitu   Musculoskeletal: Normal range of motion. No edema.   Neurological: Alert and oriented to person, place, and time.   Skin: Skin is warm and dry.   Psychiatric: Normal mood and affect. Behavior is normal.                  Vital Signs  Temp: 98.4 °F (36.9 °C) (04/26/22 1600)  Pulse: 98 (04/26/22 1609)  Resp: 18 (04/26/22 1609)  BP: 137/74 (04/26/22 1600)  SpO2: 97 % (04/26/22 1609)     24 Hour VS Range    Temp:  [97 °F (36.1 °C)-98.4 °F (36.9 °C)]   Pulse:  []   Resp:  [18-34]   BP: (111-153)/(70-83)   SpO2:  [62 %-98 %]     Intake/Output Summary (Last 24 hours) at 4/26/2022 1816  Last data filed at 4/26/2022 1105  Gross per 24 hour   Intake 1025 ml   Output 275 ml   Net 750 ml         I/O This Shift:  I/O this shift:  In: 825 [NG/GT:825]  Out: 175 [Urine:175]    Wt Readings from Last 3 Encounters:   04/20/22 61.9 kg (136 lb 7.4 oz)   04/06/22 63 kg (138 lb 14.2 oz)   04/04/22 63 kg (139 lb)       I have personally reviewed the vitals and recorded Intake/Output     Laboratory/Diagnostic Data:    CBC/Anemia Labs: Coags:    Recent Labs   Lab 04/25/22  0204 04/26/22  0711 04/26/22  0946   WBC 6.01 5.24 4.57   HGB 10.3* 10.0* 10.3*   HCT 35.7* 34.3* 34.9*     260 247   * 98 98   RDW 17.1* 16.9* 16.7*    No results for input(s): PT, INR, APTT in the last 168 hours.     Chemistries: ABG:   Recent Labs   Lab 04/25/22  0204 04/25/22  1744 04/26/22  0711 04/26/22  0946   * 145  145 143 145   K 4.2 3.2*  3.2* 3.8 3.7    101  101 100 101   CO2 37* 34*  34* 36* 32*   BUN 37* 34*  34* 20 19   CREATININE 0.7 0.8  0.8 0.7 0.7   CALCIUM 9.4 9.3  9.3 9.0 9.2   PROT 6.2  --  5.6* 6.2   BILITOT 0.2  --  0.3 0.3   ALKPHOS 74  --  73 73   ALT 21  --  18 18   AST 17  --  13 16   MG 2.2  --  2.0 2.0   PHOS 3.3 2.7  2.7 2.8 2.8    No results for input(s): PH, PCO2, PO2, HCO3, POCSATURATED, BE in the last 168 hours.     POCT Glucose: HbA1c:    No results for input(s): POCTGLUCOSE in the last 168 hours. No results found for: HGBA1C     Cardiac Enzymes: Ejection Fractions:    No results for input(s): CPK, CPKMB, MB, TROPONINI in the last 72 hours. EF   Date Value Ref Range Status   04/19/2022 60 % Final   12/30/2021 65 % Final          No results for input(s): COLORU, APPEARANCEUA, PHUR, SPECGRAV, PROTEINUA, GLUCUA, KETONESU, BILIRUBINUA, OCCULTUA, NITRITE, UROBILINOGEN, LEUKOCYTESUR, RBCUA, WBCUA, BACTERIA, SQUAMEPITHEL, HYALINECASTS in the last 48 hours.    Invalid input(s): WRIGHTSUR    Procalcitonin (ng/mL)   Date Value   04/19/2022 0.09     Lactate (Lactic Acid) (mmol/L)   Date Value   04/19/2022 1.0     BNP (pg/mL)   Date Value   04/22/2022 132 (H)   04/19/2022 1,607 (H)   03/21/2022 242 (H)   11/02/2019 260 (H)   12/27/2018 239 (H)     No results found for: CRP, SEDRATE  No results found for: DDIMER  Ferritin (ng/mL)   Date Value   02/16/2022 135     No results found for: LDH  Troponin I (ng/mL)   Date Value   04/19/2022 0.018   11/02/2019 0.016   12/27/2018 <0.006     No results found for this or any previous visit.  SARS-CoV2 (COVID-19) Qualitative PCR (no units)   Date Value   12/31/2021 Not Detected     SARS-CoV-2 RNA, Amplification, Qual (no  units)   Date Value   01/13/2022 Negative   01/03/2022 Negative     POC Rapid COVID (no units)   Date Value   04/19/2022 Negative       Microbiology labs for the last week  Microbiology Results (last 7 days)     Procedure Component Value Units Date/Time    Blood culture x two cultures. Draw prior to antibiotics. [[id number]] Collected: 04/19/22 0930    Order Status: Completed Specimen: Blood from Peripheral, Antecubital, Left Updated: 04/24/22 1012     Blood Culture, Routine No growth after 5 days.    Narrative:      Aerobic and anaerobic    Blood culture x two cultures. Draw prior to antibiotics. [[id number]] Collected: 04/19/22 0925    Order Status: Completed Specimen: Blood from Peripheral, Antecubital, Right Updated: 04/24/22 1012     Blood Culture, Routine No growth after 5 days.    Narrative:      Aerobic and anaerobic    Culture, Respiratory [[id number]]  (Abnormal)  (Susceptibility) Collected: 04/19/22 1119    Order Status: Completed Specimen: Respiratory from Sputum Updated: 04/23/22 1052     Respiratory Culture No S aureus isolated.      PSEUDOMONAS AERUGINOSA  Rare  Normal respiratory ishmael also present       Gram Stain (Respiratory) <10 epithelial cells per low power field.     Gram Stain (Respiratory) Rare WBC's     Gram Stain (Respiratory) Rare Gram positive cocci     Gram Stain (Respiratory) Rare Gram positive rods          Reviewed and noted in plan where applicable- Please see chart for full lab data.    Lines/Drains:       Peripheral IV - Single Lumen 04/19/22 0920 20 G Right Antecubital (Active)   Site Assessment Clean;Dry;Intact;No redness;No swelling;No drainage;No warmth 04/21/22 0701   Extremity Assessment Distal to IV No abnormal discoloration;No redness;No swelling;No warmth 04/21/22 0701   Line Status Infusing 04/21/22 0701   Dressing Status Clean;Dry;Intact 04/21/22 0701   Dressing Intervention Integrity maintained 04/21/22 0701   Dressing Change Due 04/23/22 04/21/22 0701   Site Change Due  04/23/22 04/21/22 0701   Reason Not Rotated Not due 04/21/22 0701   Number of days: 2            Peripheral IV - Single Lumen 04/19/22 1552 18 G Anterior;Distal;Left Forearm (Active)   Site Assessment Dry;Clean;Intact;No redness;No swelling;No warmth;No drainage 04/21/22 0701   Extremity Assessment Distal to IV No abnormal discoloration;No redness;No swelling;No warmth 04/21/22 0701   Line Status Flushed;Saline locked 04/21/22 0701   Dressing Status Clean;Dry;Intact 04/21/22 0701   Dressing Intervention Integrity maintained 04/21/22 0701   Dressing Change Due 04/23/22 04/21/22 0701   Site Change Due 04/23/22 04/21/22 0701   Reason Not Rotated Not due 04/21/22 0701   Number of days: 1            Gastrostomy/Enterostomy 01/04/22 Percutaneous endoscopic gastrostomy (PEG) LUQ (Active)   Securement secured to abdomen 04/21/22 0701   Interventions Prior to Feeding patency checked;residual checked 04/21/22 0701   Feeding Type intermittent;bolus 04/21/22 0701   Clamp Status/Tolerance clamped;no abdominal distention;no abdominal discomfort;no emesis;no nausea;no residual;no restlessness 04/21/22 0701   Feeding Action feeding continued 04/21/22 0305   Dressing dry and intact 04/21/22 0701   Insertion Site no redness;no warmth;no drainage;no tenderness;no swelling 04/21/22 0701   Site Care secured w/ tape 04/21/22 0305   Flush/Irrigation flushed w/;water;no resistance met 04/21/22 0701   Dressing Change Due 04/21/22 04/21/22 0701   Intake (mL) 120 mL 04/21/22 0701   Formula Name Impact Peptide 1.5 04/21/22 0305   Tube Feeding Intake (mL) 500 04/21/22 0701   Residual Amount (ml) 0 ml 04/21/22 0701   Number of days: 107       Imaging  ECG Results          EKG 12-lead (Final result)  Result time 04/19/22 17:42:02    Final result by Interface, Lab In [facility] (04/19/22 17:42:02)             Narrative:    Test Reason : R06.02,    Vent. Rate : 081 BPM     Atrial Rate : 081 BPM     P-R Int : 158 ms          QRS Dur : 092 ms      QT  Int : 366 ms       P-R-T Axes : 061 -70 045 degrees     QTc Int : 425 ms    Normal sinus rhythm  Left axis deviation Now present  Incomplete right bundle branch block  Abnormal ECG  When compared with ECG of 10-MADONNA-2022 10:11,    Confirmed by DIONNE SINGH MD ([id number]) on 4/19/2022 5:41:52 PM    Referred By: [email or web address]   SELF           Confirmed By:DIONNE SINGH MD                            Results for orders placed during the hospital encounter of 04/19/22    Echo    Interpretation Summary  · The left ventricle is normal in size with normal systolic function. The estimated ejection fraction is 60%.  · Normal right ventricular size with normal right ventricular systolic function.  · Normal left ventricular diastolic function.  · The aortic root is mildly dilated.  · Mechanically ventilated; cannot use inferior caval vein diameter to estimate central venous pressure. The IVC is not enlarged and does collapse somewhat with the respiratory cycle, essentially ruling out venous hypertension.      US Lower Extremity Veins Bilateral  Narrative: EXAMINATION:  US LOWER EXTREMITY VEINS BILATERAL    CLINICAL HISTORY:  R/O DVT;    TECHNIQUE:  Duplex and color flow Doppler and dynamic compression was performed of the bilateral lower extremity veins was performed.    COMPARISON:  None    FINDINGS:  Right thigh veins: The common femoral, superficial femoral, popliteal, upper greater saphenous, and deep femoral veins are patent and free of thrombus. The veins are normally compressible and have normal phasic flow and augmentation response.    Right calf veins: The visualized calf veins are patent.    Left thigh veins: The common femoral, superficial femoral, popliteal, upper greater saphenous, and deep femoral veins are patent and free of thrombus. The veins are normally compressible and have normal phasic flow and augmentation response.    Left calf veins: The visualized calf veins are patent.  Impression: No evidence of deep venous  thrombosis in either lower extremity.    Electronically signed by resident: Anuradha Arthur  Date:    04/23/2022  Time:    17:54    Electronically signed by: Marcos Toth MD  Date:    04/23/2022  Time:    18:01      Labs, Imaging, EKG and Diagnostic results from 4/26/2022 were reviewed.    Medications:  Medication list was reviewed and changes noted under Assessment/Plan.  No current facility-administered medications on file prior to encounter.     Current Outpatient Medications on File Prior to Encounter   Medication Sig Dispense Refill    amLODIPine (NORVASC) 10 MG tablet Take 10 mg by mouth once daily.      atorvastatin (LIPITOR) 20 MG tablet 1 tablet (20 mg total) by Per G Tube route once daily. 90 tablet 3    glycopyrrolate (CUVPOSA) 1 mg/5 mL (0.2 mg/mL) Soln Take 5 mLs (1 mg total) by mouth 3 (three) times daily as needed (TO REDUCE SECRETIONS). 473 mL 1    acetaminophen (TYLENOL) 325 MG tablet 2 tablets (650 mg total) by Per G Tube route every 6 (six) hours.  0    albuterol-ipratropium (DUO-NEB) 2.5 mg-0.5 mg/3 mL nebulizer solution Take 3 mLs by nebulization every 4 (four) hours as needed for Wheezing. Rescue 75 mL 0    aspirin 81 MG Chew 1 tablet (81 mg total) by Per G Tube route once daily.  0    bisacodyL (DULCOLAX) 10 mg Supp Place 1 suppository (10 mg total) rectally daily as needed.  0    clopidogreL (PLAVIX) 75 mg tablet 1 tablet (75 mg total) by Per G Tube route once daily. 30 tablet 11    duke's soln (benadryl 30 mL, mylanta 30 mL, LIDOcaine 30 mL, nystatin 30 mL) 120mL Take 10 mLs by mouth 4 (four) times daily as needed (mucositis). 360 mL 0    enoxaparin (LOVENOX) 40 mg/0.4 mL Syrg Inject 0.4 mLs (40 mg total) into the skin once daily.      folic acid (FOLVITE) 1 MG tablet 1 tablet (1 mg total) by Per G Tube route once daily.  0    guaiFENesin 200 mg/5 mL Liqd Take 400 mg by mouth every 4 (four) hours as needed (for secretions). 473 mL 3    losartan (COZAAR) 50 MG tablet 1 tablet (50  mg total) by Per G Tube route once daily. 90 tablet 3    melatonin (MELATIN) 3 mg tablet 2 tablets (6 mg total) by Per G Tube route nightly.  0    metoprolol tartrate (LOPRESSOR) 100 MG tablet 1 tablet (100 mg total) by Per G Tube route 2 (two) times daily. 60 tablet 11    ondansetron (ZOFRAN-ODT) 8 MG TbDL Take 1 tablet (8 mg total) by mouth every 8 (eight) hours as needed (nausea). 30 tablet 1    oxyCODONE (ROXICODONE) 5 mg/5 mL Soln 5 mLs (5 mg total) by Per G Tube route every 4 (four) hours as needed (Pain). 150 mL 0    polyethylene glycol (GLYCOLAX) 17 gram PwPk 17 g by Per G Tube route 2 (two) times daily.  0    senna-docusate 8.6-50 mg (PERICOLACE) 8.6-50 mg per tablet 1 tablet by Per G Tube route 2 (two) times daily.      sodium chloride (OCEAN) 0.65 % nasal spray 1 spray by Nasal route as needed for Congestion.  12    sodium chloride 3% 3 % nebulizer solution Take 4 mLs by nebulization every 8 (eight) hours.      thiamine 100 MG tablet 1 tablet (100 mg total) by Per G Tube route once daily.       Scheduled Medications:  albuterol-ipratropium, 3 mL, Nebulization, Q4H  aspirin, 81 mg, Per G Tube, Daily  atorvastatin, 20 mg, Per G Tube, Daily  clopidogreL, 75 mg, Per G Tube, Daily  enoxaparin, 40 mg, Subcutaneous, Daily  folic acid, 1 mg, Per G Tube, Daily  melatonin, 6 mg, Per G Tube, Nightly  piperacillin-tazobactam (ZOSYN) IVPB, 4.5 g, Intravenous, Q8H  polyethylene glycol, 17 g, Per G Tube, BID  sodium chloride 3%, 4 mL, Nebulization, Q8H  thiamine, 100 mg, Per G Tube, Daily      PRN: sodium chloride, albuterol-ipratropium, bisacodyL, duke's soln (benadryl 30 mL, mylanta 30 mL, LIDOcaine 30 mL, nystatin 30 mL) 120 mL, glycopyrrolate, ondansetron, oxyCODONE, sodium chloride, sodium chloride 0.9%  Infusions:     Estimated Creatinine Clearance: 82.3 mL/min (based on SCr of 0.7 mg/dL).    Assessment/Plan:      * Acute on chronic respiratory failure    Pt with SCC of the tongue s/p total glossectomy,  chemowith cisplatin, and nearing completion of radiation with trach, COPD, asthma, HTN, CAD s/p PCI presenting for acute on chronic hypoxemic respiratory failure. He is on 5L with trach collar at home. He has had increased yellow secretions, work of breathing and SOB for the past several days. BNP elevated to 1600 on admission with pulmonary edema and new cardiomegaly on CXR. Trop wnl. Bicarb is elevated suggesting chronic hypercapnia. Suctioning has cleared thick mucous. He was able to be weaned down to his home O2, however after ~ 20 minutes he desats again, requiring deep suction with resolution of hypoxia. Now stable on home O2. Diuresed -3L        -Pt stable on home O2 for 24 hours, ready to stepdown to floor  - CAP coverage with ceftriaxone/azithro  - Methylprednisolone 40 mg x5 days for possible element of COPD exacerbation  - Duonebs q4  - Hypertonic saline nebs  - Chest PT  - Suction PRN    4/21 Transfer to hospital medicine . 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  initially requiring vent but has now been stable on home trach collar for 24 hours. sats 93% on 8L via trach collar.  Awaiting cultures from sputum, on CAP coverage with ceftriaxone. completed Azithromycin. continue solumedrol 40mg IV daily.   4/22 sats 95% on trach collar 10/ fio2 60% . completed azithromycin.   started on Zosyn for possible aspiration  repeat CX ray-in the inferior hemithorax on the left side consistent with airspace consolidation/volume loss in the left lower lobe is again appreciated, but the lung zones are essentially stable since the prior exam and demonstrate no new areas of airspace consolidation or volume loss.  respiratory culture 4/19 with pansensitive pseudomonas.   4/25   oxygen tapered to 8L/ Fio2 35%        Aspiration pneumonia    4/22 sats 95% on trach collar 10/ fio2 60% . completed  azithromycin.   started on Zosyn for possible aspiration  repeat CX ray-in the inferior hemithorax on the left side consistent with airspace consolidation/volume loss in the left lower lobe is again appreciated, but the lung zones are essentially stable since the prior exam and demonstrate no new areas of airspace consolidation or volume loss.  respiratory culture 4/19 with pseudomonas.           Dysphagia  Nutrition consulted. Most recent weight and BMI monitored-          Malnutrition (Moderate to Severe)  Weight Loss (Malnutrition): greater than 10% in 6 months                 Measurements:  Wt Readings from Last 1 Encounters:   04/20/22 61.9 kg (136 lb 7.4 oz)   Body mass index is 20.75 kg/m².    Recommendations: Recommendation/Intervention: 1.  Goals: Meet % EEN, EPN by RD f/u date    Patient has been screened and assessed by RD. RD will follow patient.    4/21 secondary to above. on G tube feedings   4/22 changed to bolus GT feedings.   4/23 intermittently refusing bolus GT feedings.      Hypokalemia  replaced       Hypernatremia  4/25 sodium 150. started on D5W . repeat renal panel in PM       Chronic respiratory failure with hypoxia, on home O2 therapy  secondary to COPD. on 5L oxygen  at home    Protein-calorie malnutrition  Nutrition consulted. Most recent weight and BMI monitored-          Malnutrition (Moderate to Severe)  Weight Loss (Malnutrition): greater than 10% in 6 months              Measurements:  Wt Readings from Last 1 Encounters:   04/20/22 61.9 kg (136 lb 7.4 oz)   Body mass index is 20.75 kg/m².    Recommendations: Recommendation/Intervention: 1.  Goals: Meet % EEN, EPN by RD f/u date    Patient has been screened and assessed by RD. RD will follow patient.      Normocytic anemia    Patient's with Normocytic anemia.. Hemoglobin stable. Etiology likely due to chronic disease .  Current CBC reviewed-  Recent Labs   Lab 04/19/22  1442 04/20/22  0531 04/21/22  0333   HGB 6.5* 9.2*  9.8*     Monitor CBC and transfuse if H/H drops below 7/21.       Other hyperlipidemia  Continue home statin         Primary hypertension    Chronic, controlled.  Latest blood pressure and vitals reviewed-   Temp:  [96.9 °F (36.1 °C)-99.2 °F (37.3 °C)]   Pulse:  []   Resp:  [18-61]   BP: ()/(62-95)   SpO2:  [91 %-99 %] .   Home meds for hypertension were reviewed- amlodipine, losartan and metoprolol  held for now  PRN meds if BP> 180/110 mm HG      Coronary artery disease involving native coronary artery of native heart without angina pectoris    Continue ASA, plavix, and statin       Squamous cell cancer of tongue    12/15/21 FNA left neck mass : squamous cell carcinoma, p16 negative  1/4/22 total glossectomy, bilateral neck dissection, bilateral cervical facial advancement flaps and anterolateral thigh free flap reconstruction of his glossectomy defect.  Final path :  [id number] (+microscopic SALINAS, single contralateral node), superior soft tissue margin of left neck with tumor.  2/28/22 start chemoradiation  Finished chemo with cisplatin 4/6. Was going to complete final dose of radiation tomorrow.     -Rad/onc consulted for radiation while inpatient  4/22  s/p radiation oncology eval -on adjuvant chemoradiation, completed 31/33 fractions of RT.  renitiation of RT  inpatient when patient able to tolerate.    completed radiation        VTE Risk Mitigation (From admission, onward)         Ordered     enoxaparin injection 40 mg  Daily         04/19/22 1123     IP VTE HIGH RISK PATIENT  Once         04/19/22 1123     Place sequential compression device  Until discontinued         04/19/22 1123                Discharge Planning   NISA: 4/28/2022     Code Status: Full Code   Is the patient medically ready for discharge?: No    Reason for patient still in hospital (select all that apply): Treatment  Discharge Plan A: Home Health                  Jordan Armenta MD  Department of Hospital Medicine   [facility] -  Cardiac Medical ICU

## 2022-04-26 NOTE — CARE UPDATE
RAPID RESPONSE NURSE ROUND       Rounding completed with charge RN, Kirstin. No concerns verbalized at this time. Instructed to call [phone] for further concerns or assistance.

## 2022-04-26 NOTE — PT/OT/SLP PROGRESS
Physical Therapy      Patient Name:  Fareed Richard Jr.   MRN:  [id number]    Attempted to see patient for PT; however patient with notably increased WOB breathing upon arrival. Per attempts to communicate with pt, pt c/o SOB, oxygen saturation 92-93% at rest, and notable continued production of secretions while present in room. Pt requesting deep suctioning and politely requested PT to f/u at a later time. Physical therapy will follow-up as able.    Beatriz Castro, PT, DPT, GCS  4/26/2022

## 2022-04-26 NOTE — PLAN OF CARE
Problem: Adult Inpatient Plan of Care  Goal: Plan of Care Review  Outcome: Ongoing, Progressing  Goal: Patient-Specific Goal (Individualized)  Outcome: Ongoing, Progressing  Goal: Absence of Hospital-Acquired Illness or Injury  Outcome: Ongoing, Progressing  Goal: Optimal Comfort and Wellbeing  Outcome: Ongoing, Progressing  Goal: Readiness for Transition of Care  Outcome: Ongoing, Progressing     Problem: Impaired Wound Healing  Goal: Optimal Wound Healing  Outcome: Ongoing, Progressing     Problem: Skin Injury Risk Increased  Goal: Skin Health and Integrity  Outcome: Ongoing, Progressing     Problem: Communication Impairment (Artificial Airway)  Goal: Effective Communication  Outcome: Ongoing, Progressing     Problem: Device-Related Complication Risk (Artificial Airway)  Goal: Optimal Device Function  Outcome: Ongoing, Progressing     Problem: Skin and Tissue Injury (Artificial Airway)  Goal: Absence of Device-Related Skin or Tissue Injury  Outcome: Ongoing, Progressing     Problem: Noninvasive Ventilation Acute  Goal: Effective Unassisted Ventilation and Oxygenation  Outcome: Ongoing, Progressing     Problem: Fall Injury Risk  Goal: Absence of Fall and Fall-Related Injury  Outcome: Ongoing, Progressing    Pt Aox4, on 8L and 35% trach collar and tolerating well, Bps elevated otherwise VSS, and denies pain. K replaced overnight. Iv abx administered. Pt refusing overnight TF bolus. No acute events overnight.

## 2022-04-27 LAB
ALBUMIN SERPL BCP-MCNC: 2.5 G/DL (ref 3.5–5.2)
ALP SERPL-CCNC: 69 U/L (ref 55–135)
ALT SERPL W/O P-5'-P-CCNC: 17 U/L (ref 10–44)
ANION GAP SERPL CALC-SCNC: 10 MMOL/L (ref 8–16)
AST SERPL-CCNC: 22 U/L (ref 10–40)
BASOPHILS # BLD AUTO: 0.05 K/UL (ref 0–0.2)
BASOPHILS NFR BLD: 0.9 % (ref 0–1.9)
BILIRUB SERPL-MCNC: 0.2 MG/DL (ref 0.1–1)
BUN SERPL-MCNC: 22 MG/DL (ref 8–23)
CALCIUM SERPL-MCNC: 9.3 MG/DL (ref 8.7–10.5)
CHLORIDE SERPL-SCNC: 103 MMOL/L (ref 95–110)
CO2 SERPL-SCNC: 27 MMOL/L (ref 23–29)
CREAT SERPL-MCNC: 0.7 MG/DL (ref 0.5–1.4)
DIFFERENTIAL METHOD: ABNORMAL
EOSINOPHIL # BLD AUTO: 0.1 K/UL (ref 0–0.5)
EOSINOPHIL NFR BLD: 1.1 % (ref 0–8)
ERYTHROCYTE [DISTWIDTH] IN BLOOD BY AUTOMATED COUNT: 16.8 % (ref 11.5–14.5)
EST. GFR  (AFRICAN AMERICAN): >60 ML/MIN/1.73 M^2
EST. GFR  (NON AFRICAN AMERICAN): >60 ML/MIN/1.73 M^2
GLUCOSE SERPL-MCNC: 127 MG/DL (ref 70–110)
HCT VFR BLD AUTO: 32.8 % (ref 40–54)
HGB BLD-MCNC: 10.7 G/DL (ref 14–18)
IMM GRANULOCYTES # BLD AUTO: 0.22 K/UL (ref 0–0.04)
IMM GRANULOCYTES NFR BLD AUTO: 4 % (ref 0–0.5)
LYMPHOCYTES # BLD AUTO: 0.8 K/UL (ref 1–4.8)
LYMPHOCYTES NFR BLD: 14 % (ref 18–48)
MAGNESIUM SERPL-MCNC: 2 MG/DL (ref 1.6–2.6)
MCH RBC QN AUTO: 29.6 PG (ref 27–31)
MCHC RBC AUTO-ENTMCNC: 32.6 G/DL (ref 32–36)
MCV RBC AUTO: 91 FL (ref 82–98)
MONOCYTES # BLD AUTO: 0.7 K/UL (ref 0.3–1)
MONOCYTES NFR BLD: 12.9 % (ref 4–15)
NEUTROPHILS # BLD AUTO: 3.7 K/UL (ref 1.8–7.7)
NEUTROPHILS NFR BLD: 67.1 % (ref 38–73)
NRBC BLD-RTO: 0 /100 WBC
PHOSPHATE SERPL-MCNC: 3.6 MG/DL (ref 2.7–4.5)
PLATELET # BLD AUTO: 203 K/UL (ref 150–450)
PMV BLD AUTO: 8.7 FL (ref 9.2–12.9)
POTASSIUM SERPL-SCNC: 4.1 MMOL/L (ref 3.5–5.1)
PROT SERPL-MCNC: 6 G/DL (ref 6–8.4)
RBC # BLD AUTO: 3.62 M/UL (ref 4.6–6.2)
SODIUM SERPL-SCNC: 140 MMOL/L (ref 136–145)
WBC # BLD AUTO: 5.51 K/UL (ref 3.9–12.7)

## 2022-04-27 PROCEDURE — 63600175 PHARM REV CODE 636 W HCPCS: Performed by: INTERNAL MEDICINE

## 2022-04-27 PROCEDURE — 85025 COMPLETE CBC W/AUTO DIFF WBC: CPT | Performed by: HOSPITALIST

## 2022-04-27 PROCEDURE — 25000242 PHARM REV CODE 250 ALT 637 W/ HCPCS: Performed by: HOSPITALIST

## 2022-04-27 PROCEDURE — 25000242 PHARM REV CODE 250 ALT 637 W/ HCPCS: Performed by: STUDENT IN AN ORGANIZED HEALTH CARE EDUCATION/TRAINING PROGRAM

## 2022-04-27 PROCEDURE — 99900035 HC TECH TIME PER 15 MIN (STAT)

## 2022-04-27 PROCEDURE — 27000221 HC OXYGEN, UP TO 24 HOURS

## 2022-04-27 PROCEDURE — 99232 PR SUBSEQUENT HOSPITAL CARE,LEVL II: ICD-10-PCS | Mod: ,,, | Performed by: HOSPITALIST

## 2022-04-27 PROCEDURE — 20600001 HC STEP DOWN PRIVATE ROOM

## 2022-04-27 PROCEDURE — 94668 MNPJ CHEST WALL SBSQ: CPT

## 2022-04-27 PROCEDURE — 80053 COMPREHEN METABOLIC PANEL: CPT | Performed by: HOSPITALIST

## 2022-04-27 PROCEDURE — 99900026 HC AIRWAY MAINTENANCE (STAT)

## 2022-04-27 PROCEDURE — 94761 N-INVAS EAR/PLS OXIMETRY MLT: CPT

## 2022-04-27 PROCEDURE — 99232 SBSQ HOSP IP/OBS MODERATE 35: CPT | Mod: ,,, | Performed by: HOSPITALIST

## 2022-04-27 PROCEDURE — 25000242 PHARM REV CODE 250 ALT 637 W/ HCPCS: Performed by: INTERNAL MEDICINE

## 2022-04-27 PROCEDURE — 25000003 PHARM REV CODE 250: Performed by: INTERNAL MEDICINE

## 2022-04-27 PROCEDURE — 97530 THERAPEUTIC ACTIVITIES: CPT

## 2022-04-27 PROCEDURE — 84100 ASSAY OF PHOSPHORUS: CPT | Performed by: HOSPITALIST

## 2022-04-27 PROCEDURE — 94640 AIRWAY INHALATION TREATMENT: CPT

## 2022-04-27 PROCEDURE — 25000003 PHARM REV CODE 250: Performed by: STUDENT IN AN ORGANIZED HEALTH CARE EDUCATION/TRAINING PROGRAM

## 2022-04-27 PROCEDURE — 83735 ASSAY OF MAGNESIUM: CPT | Performed by: HOSPITALIST

## 2022-04-27 RX ADMIN — SODIUM CHLORIDE SOLN NEBU 3% 4 ML: 3 NEBU SOLN at 11:04

## 2022-04-27 RX ADMIN — IPRATROPIUM BROMIDE AND ALBUTEROL SULFATE 3 ML: 2.5; .5 SOLUTION RESPIRATORY (INHALATION) at 03:04

## 2022-04-27 RX ADMIN — FOLIC ACID 1 MG: 1 TABLET ORAL at 09:04

## 2022-04-27 RX ADMIN — ASPIRIN 81 MG CHEWABLE TABLET 81 MG: 81 TABLET CHEWABLE at 09:04

## 2022-04-27 RX ADMIN — MELATONIN TAB 3 MG 6 MG: 3 TAB at 08:04

## 2022-04-27 RX ADMIN — IPRATROPIUM BROMIDE AND ALBUTEROL SULFATE 3 ML: 2.5; .5 SOLUTION RESPIRATORY (INHALATION) at 11:04

## 2022-04-27 RX ADMIN — SODIUM CHLORIDE SOLN NEBU 3% 4 ML: 3 NEBU SOLN at 03:04

## 2022-04-27 RX ADMIN — IPRATROPIUM BROMIDE AND ALBUTEROL SULFATE 3 ML: 2.5; .5 SOLUTION RESPIRATORY (INHALATION) at 08:04

## 2022-04-27 RX ADMIN — PIPERACILLIN SODIUM AND TAZOBACTAM SODIUM 4.5 G: 4; .5 INJECTION, POWDER, FOR SOLUTION INTRAVENOUS at 08:04

## 2022-04-27 RX ADMIN — CLOPIDOGREL 75 MG: 75 TABLET, FILM COATED ORAL at 09:04

## 2022-04-27 RX ADMIN — PIPERACILLIN SODIUM AND TAZOBACTAM SODIUM 4.5 G: 4; .5 INJECTION, POWDER, FOR SOLUTION INTRAVENOUS at 12:04

## 2022-04-27 RX ADMIN — THIAMINE HCL TAB 100 MG 100 MG: 100 TAB at 09:04

## 2022-04-27 RX ADMIN — GLYCOPYRROLATE 1 MG: 1 LIQUID ORAL at 04:04

## 2022-04-27 RX ADMIN — PIPERACILLIN SODIUM AND TAZOBACTAM SODIUM 4.5 G: 4; .5 INJECTION, POWDER, FOR SOLUTION INTRAVENOUS at 04:04

## 2022-04-27 RX ADMIN — SODIUM CHLORIDE SOLN NEBU 3% 4 ML: 3 NEBU SOLN at 09:04

## 2022-04-27 RX ADMIN — ENOXAPARIN SODIUM 40 MG: 40 INJECTION SUBCUTANEOUS at 04:04

## 2022-04-27 RX ADMIN — ATORVASTATIN CALCIUM 20 MG: 20 TABLET, FILM COATED ORAL at 09:04

## 2022-04-27 NOTE — CARE UPDATE
RAPID RESPONSE NURSE ROUND       Rounding completed with charge RN, Irene. No concerns verbalized at this time. Instructed to call [phone] for further concerns or assistance.

## 2022-04-27 NOTE — PLAN OF CARE
Problem: Adult Inpatient Plan of Care  Goal: Plan of Care Review  Outcome: Ongoing, Progressing  Goal: Patient-Specific Goal (Individualized)  Outcome: Ongoing, Progressing  Goal: Absence of Hospital-Acquired Illness or Injury  Outcome: Ongoing, Progressing  Goal: Optimal Comfort and Wellbeing  Outcome: Ongoing, Progressing  Goal: Readiness for Transition of Care  Outcome: Ongoing, Progressing     Problem: Impaired Wound Healing  Goal: Optimal Wound Healing  Outcome: Ongoing, Progressing     Problem: Skin Injury Risk Increased  Goal: Skin Health and Integrity  Outcome: Ongoing, Progressing     Problem: Communication Impairment (Artificial Airway)  Goal: Effective Communication  Outcome: Ongoing, Progressing     Problem: Device-Related Complication Risk (Artificial Airway)  Goal: Optimal Device Function  Outcome: Ongoing, Progressing     Problem: Skin and Tissue Injury (Artificial Airway)  Goal: Absence of Device-Related Skin or Tissue Injury  Outcome: Ongoing, Progressing     Problem: Noninvasive Ventilation Acute  Goal: Effective Unassisted Ventilation and Oxygenation  Outcome: Ongoing, Progressing     Problem: Fall Injury Risk  Goal: Absence of Fall and Fall-Related Injury  Outcome: Ongoing, Progressing     Pt sitting upright in bed w/ HOB elevated at 45 degrees. NADN. Pleasant affect this shift. Denies pain/discomfort/SOB. 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 Due to surgery, pt has expressive dysphagia et frequently communicates with pen/paper. O2 is infusing at 8L (35%) with sats ranging from 87% this shift on exertion to 92% at rest. No suctioning required by nursing staff thus far this shift. NPO with all nutritional intake provided by staff via peg tube. Peg tube is patent w/ no residual ret this shift. Requests to receive 1.5 cartons QID between 9061-6472 et forego feeding during hs, indicating gastrointestinal complications during hs. Discussed w/ MD  et RD w/ okay to administer per home schedule. 22g INT in place to left AC - Zosyn ongoing as ABT per orders w/o s/s a/r noted. Transfers to [facility] x 1 staff at [facility] to assist with mechanical device tubings. No areas of skin concern noted. Independently changes positions, ref heel protectors while in bed. Aware of risks for skin breakdown. Non-skid socks in place when transferring. No changes at this time

## 2022-04-27 NOTE — PROGRESS NOTES
[facility] - Telemetry Stepdown  Adult Nutrition  Consult Note    SUMMARY     Recommendations    1. Continue current TF regimen of Isosource 1.5 (bolus) 6 cartons/day - meeting needs.  2. RD to monitor & follow-up.    Goals: Meet % EEN, EPN by RD f/u date  Nutrition Goal Status: goal met  Communication of RD Recs: reviewed with RN    Assessment and Plan    Protein-calorie malnutrition    Nutrition Problem:  Severe Protein-Calorie Malnutrition  Malnutrition in the context of Chronic Illness/Injury    Related to (etiology):  Inability to consume sufficient energy    Signs and Symptoms (as evidenced by):  Body Fat Depletion: severe depletion of orbitals   Muscle Mass Depletion: severe depletion of temples, clavicle region and lower extremities   Weight Loss: 15% x 6 months    Interventions(treatment strategy):  Collaboration of nutrition care w/ other providers  Enteral nutrition     Nutrition Diagnosis Status:  New     Malnutrition Assessment    Weight Loss (Malnutrition): greater than 10% in 6 months   Orbital Region (Subcutaneous Fat Loss): severe depletion   [place] Region (Muscle Loss): severe depletion  Clavicle Bone Region (Muscle Loss): severe depletion  Dorsal Hand (Muscle Loss): severe depletion  Anterior Thigh Region (Muscle Loss): severe depletion     Reason for Assessment    Reason For Assessment: RD follow-up  Diagnosis: other (see comments) (Resp. fx)  Relevant Medical History: SCC of tongue, total glossectomy, trach, PEG  Interdisciplinary Rounds: attended    General Information Comments: On trach collar - remains NPO, tolerating enteral nutrition via PEG. Per RN (per pt's wife), pt would prefer to do 1.5 cans - 4x/day day between 8am-8pm. UBW: 160#; chart review confirms. NFPE complete 4/20 - pt w/ severe fat & muscle wasting. RD feels pt meets criteria for severe malnutrition. Please see PES statement for details.  Nutrition Discharge Planning: Pending clinical course    Nutrition/Diet  "History    Patient Reported Diet/Restrictions/Preferences: no oral intake  Spiritual, Cultural Beliefs, Catholic Practices, Values that Affect Care: no  Factors Affecting Nutritional Intake: NPO  Nutrition Support Formula Prior to Admit: Isosource 1.5  Nutrition Support Rate Prior to Admit: 6 cans/day (bolus)    Anthropometrics    Temp: 97.8 °F (36.6 °C)  Height: 5' 8" (172.7 cm)  Height (inches): 68 in  Weight Method: Bed Scale  Weight: 62.4 kg (137 lb 9.1 oz)  Weight (lb): 137.57 lb  Ideal Body Weight (IBW), Male: 154 lb  % Ideal Body Weight, Male (lb): 89.33 %  BMI (Calculated): 20.9  BMI Grade: 18.5-24.9 - normal  Usual Body Weight (UBW), k.7 kg  % Usual Body Weight: 85.32  % Weight Change From Usual Weight: -14.86 %    Lab/Procedures/Meds    Pertinent Labs Reviewed: reviewed  Pertinent Labs Comments: -  Pertinent Medications Reviewed: reviewed  Pertinent Medications Comments: Folic acid, Thiamine    Estimated/Assessed Needs    Weight Used For Calorie Calculations: 62.4 kg (137 lb 9.1 oz)     Energy Calorie Requirements (kcal): 7816-2725 kcal/d  Energy Need Method: Kcal/kg (30-35 kcal/kg)     Protein Requirements: 94 g/d (1.5 g/kg)  Weight Used For Protein Calculations: 62.4 kg (137 lb 9.1 oz)     Estimated Fluid Requirement Method: other (see comments) (Per MD or 1 mL/kcal)  RDA Method (mL): 1872    Nutrition Prescription Ordered    Current Diet Order: NPO  Current Nutrition Support Formula Ordered: Isosource 1.5  Current Nutrition Support Rate Ordered: 6 cartons/day    Evaluation of Received Nutrient/Fluid Intake    Enteral Calories (kcal): 2250  Enteral Protein (gm): 102  Enteral (Free Water) Fluid (mL): 1146    % Kcal Needs: 103%  % Protein Needs: 109%    I/O: +8.1L since admit    Energy Calories Required: meeting needs  Protein Required: meeting needs  Fluid Required: other (see comments) (Per MD)    Comments: LBM:     Tolerance: tolerating    Nutrition Risk    Level of Risk/Frequency of " Follow-up:  (1x/week)     Monitor and Evaluation    Food and Nutrient Intake: enteral nutrition intake  Food and Nutrient Adminstration: enteral and parenteral nutrition administration  Physical Activity and Function: nutrition-related ADLs and IADLs  Anthropometric Measurements: weight, weight change  Biochemical Data, Medical Tests and Procedures: glucose/endocrine profile, lipid profile, inflammatory profile, gastrointestinal profile, electrolyte and renal panel  Nutrition-Focused Physical Findings: overall appearance     Nutrition Follow-Up    RD Follow-up?: Yes

## 2022-04-27 NOTE — PLAN OF CARE
Recommendations     1. Continue current TF regimen of Isosource 1.5 (bolus) 6 cartons/day - meeting needs.  2. RD to monitor & follow-up.     Goals: Meet % EEN, EPN by RD f/u date  Nutrition Goal Status: goal met  Communication of RD Recs: reviewed with RN

## 2022-04-27 NOTE — PLAN OF CARE
Problem: Adult Inpatient Plan of Care  Goal: Plan of Care Review  Outcome: Ongoing, Progressing  Goal: Patient-Specific Goal (Individualized)  Outcome: Ongoing, Progressing  Goal: Absence of Hospital-Acquired Illness or Injury  Outcome: Ongoing, Progressing  Goal: Optimal Comfort and Wellbeing  Outcome: Ongoing, Progressing  Goal: Readiness for Transition of Care  Outcome: Ongoing, Progressing     Problem: Impaired Wound Healing  Goal: Optimal Wound Healing  Outcome: Ongoing, Progressing     Problem: Skin Injury Risk Increased  Goal: Skin Health and Integrity  Outcome: Ongoing, Progressing     Problem: Communication Impairment (Artificial Airway)  Goal: Effective Communication  Outcome: Ongoing, Progressing     Problem: Device-Related Complication Risk (Artificial Airway)  Goal: Optimal Device Function  Outcome: Ongoing, Progressing     Problem: Skin and Tissue Injury (Artificial Airway)  Goal: Absence of Device-Related Skin or Tissue Injury  Outcome: Ongoing, Progressing

## 2022-04-27 NOTE — CARE UPDATE
RAPID RESPONSE NURSE ROUND       Rounding completed with charge RN, Kirstin. No concerns verbalized at this time. Instructed to call [phone] for further concerns or assistance.

## 2022-04-27 NOTE — PT/OT/SLP PROGRESS
Physical Therapy Treatment    Patient Name:  Fareed Richard Jr.   MRN:  [id number]    Recommendations:     Discharge Recommendations:  nursing facility, skilled   Discharge Equipment Recommendations: none   Barriers to discharge: increased assistance required    Assessment:     Fareed Richard Jr. is a 72 y.o. male admitted with a medical diagnosis of Acute on chronic respiratory failure.  He presents with the following impairments/functional limitations:  impaired balance, weakness, decreased safety awareness, impaired endurance, impaired functional mobilty, gait instability, impaired cardiopulmonary response to activity.  Pt seen for education pt deferred all attempts at functional mobility on this date. Pt with limited insight into current situation and impairments, stating he doesn't want to mobilize as he had just eaten, however then also reporting normally at home he does not spend time in bed. Pt has minimally been trasnferring to/from commode as needed for toileting however states he will be 'fine when going home' because he 'has people coming in'. Pt states no concerns re: stairs required to enter home as well. Education provided re: concerns for current functional status and impairments and safety and feasibility of discharging home. Will continue to f/u as able for further assessment, progression, and education as able. Pt continues to present below their independent prior functional baseline. Pt would benefit from continued, skilled PT while in house to address the above listed impairments, further progress mobility as able, return towards highest level of function.      Rehab Prognosis: Fair; patient continues to benefit from acute skilled PT services to address these deficits and reach maximum level of function.  Patient remains most appropriate to discharge to nursing facility, skilled  Recent Surgery: * No surgery found *      Plan:     During this hospitalization, patient to be seen 3 x/week to address  "the identified rehab impairments via gait training, therapeutic activities, therapeutic exercises, neuromuscular re-education and progress toward the following goals:    · Plan of Care Expires:  05/22/22    Subjective     Subjective: Pt witting to communicate needs 2/2 trach.    Pain/Comfort:   · Pain Rating 1: 0/10      Objective:     Communicated with RN prior to session.  Patient found supine with Tracheostomy, oxygen, pulse ox (continuous), telemetry upon PT entry to room.      General Precautions: Standard, fall   Orthopedic Precautions:N/A   Braces: N/A     Functional Mobility: pt deferred all attempts at mobility at this time, stating "I just ate, I don't like to move after I eat while I'm in the hospital.' Pt then witting that he does not spend time in bed at home, but that he does not want to get up at this time.      AM-PAC 6 CLICK MOBILITY  Turning over in bed (including adjusting bedclothes, sheets and blankets)?: 3  Sitting down on and standing up from a chair with arms (e.g., wheelchair, bedside commode, etc.): 3  Moving from lying on back to sitting on the side of the bed?: 3  Moving to and from a bed to a chair (including a wheelchair)?: 3  Need to walk in hospital room?: 2  Climbing 3-5 steps with a railing?: 2  Basic Mobility Total Score: 16       Education:  Education provided re: d/c planning, PT POC, benefits of mobility, risks of immobility. Pt deferred mobility with impaired insight into current situation and deficits, stating he has people coming in to help at home but then also stating he will be able to ascend stairs into house despite completing transfer to/from commode as needed while in house. Pt continued to defer mobility, citing no concerns.     Patient left supine with all lines intact, call button in reach and RN notified.    GOALS:   Multidisciplinary Problems     Physical Therapy Goals        Problem: Physical Therapy    Goal Priority Disciplines Outcome Goal Variances " Interventions   Physical Therapy Goal     PT, PT/OT Ongoing, Progressing     Description: Goals to be met by: 22    Patient will increase functional independence with mobility by performin. Pt will perform supine<>sit with supervision to improve independence with mobility  2. Pt will perform functional transfers with supervision   3. Pt will ambulate >150 ft feet with LRAD and supervision to safely perform household distance ambulation  4. Pt will ascend/descend 5 stairs with supervision to safely manage within home.                    Time Tracking:     PT Received On: 22  PT Start Time: 932     PT Stop Time: 946  PT Total Time (min): 14 min     Billable Minutes: Therapeutic Activity 14 min    Treatment Type: Treatment  PT/PTA: PT           Beatriz Castro PT, DPT  2022

## 2022-04-27 NOTE — PLAN OF CARE
[facility] - Telemetry Stepdown  Discharge Reassessment    Primary Care Provider: Kodi Tubbs MD    Expected Discharge Date: 4/28/2022    Reassessment (most recent)     Discharge Reassessment - 04/27/22 1603        Discharge Reassessment    Assessment Type Discharge Planning Reassessment     Did the patient's condition or plan change since previous assessment? No     Discharge Plan discussed with: Patient     Discharge Plan A Home;Home Health     Discharge Plan B Skilled Nursing Facility     DME Needed Upon Discharge  other (see comments)   TBD    Discharge Barriers Identified None     Why the patient remains in the hospital Requires continued medical care        Post-Acute Status    Post-Acute Authorization Placement;Home Health     Post-Acute Placement Status Patient declined/refused     Home Health Status Referrals Sent     Discharge Delays None known at this time               SW met with pt to discuss discharge plan and recommendations for SNF placement.  Pt advised SW he is not interested in SNF services and would like to go home.  Pt agreeable to HH services.  Per notes, [facility] but they are no longer in network with [facility].  SW will continue to follow and assist with d/c.      Milady Thomson LMSW  PRN-  [facility]  Ext. [phone]

## 2022-04-27 NOTE — RESPIRATORY THERAPY
RAPID RESPONSE RESPIRATORY THERAPY PROACTIVE NOTE           Time of visit: 1101     Code Status: Full Code   : 1950  Bed: 8094/8094 A:   MRN: [id number]  Time spent at the bedside: < 15 min    SITUATION    Evaluated patient for: LDA Check     BACKGROUND    Patient has a past medical history of Cancer, COPD (chronic obstructive pulmonary disease), Hyperlipidemia, Hypertension, and Pseudoaneurysm.  Clinically Significant Surgical Hx: tracheostomy    24 Hours Vitals Range:  Temp:  [97 °F (36.1 °C)-98.4 °F (36.9 °C)]   Pulse:  []   Resp:  [16-22]   BP: (109-158)/(67-85)   SpO2:  [93 %-100 %]     Labs:    Recent Labs     22  0711 22  0946 22  0530    145 140   K 3.8 3.7 4.1    101 103   CO2 36* 32* 27   CREATININE 0.7 0.7 0.7   * 144* 127*   PHOS 2.8 2.8 3.6   MG 2.0 2.0 2.0        No results for input(s): PH, PCO2, PO2, HCO3, POCSATURATED, BE in the last 72 hours.    ASSESSMENT/INTERVENTIONS    Upon arrival in room pt resting comfortably with no respiratory needs at this time.    Last VS   Temp: 97.7 °F (36.5 °C) (1618)  Pulse: 99 (1618)  Resp: 20 (1618)  BP: 109/67 (1618)  SpO2: 96 % (1618)    Level of Consciousness: Level of Consciousness (AVPU): alert  Respiratory Effort: Respiratory Effort: Unlabored Expansion/Accessory Muscle Usage: Expansion/Accessory Muscles/Retractions: expansion symmetric  All Lung Field Breath Sounds: All Lung Fields Breath Sounds: Anterior:, diminished, equal bilaterally  DEE Breath Sounds: diminished  LLL Breath Sounds: diminished  RUL Breath Sounds: diminished  RML Breath Sounds: diminished  RLL Breath Sounds: diminished  O2 Device/Concentration: Flow (L/min): 8, Oxygen Concentration (%): 35, O2 Device (Oxygen Therapy): Trach Collar  Surgical airway: Yes, Type: Shiley Size: 6, uncuffed  Ambu at bedside: Ambu bag with the patient?: Yes, Adult Ambu     Active Orders   Respiratory Care    Chest physiotherapy TID      Frequency: TID     Number of Occurrences: Until Specified     Order Questions:      Indications: COPIOUS SPUTUM PRODUCTION      Indications: ATELECTASIS PROPHYLAXIS      Indications: ATELECTASIS NONRESPONSIVE TO TX    Inhalation Treatment Q4H     Frequency: Q4H     Number of Occurrences: Until Specified    Oxygen Continuous     Frequency: Continuous     Number of Occurrences: Until Specified     Order Questions:      Device type: Low flow      Device: Trach Collar (Comment: 8L)      FiO2%: 35%      Titrate O2 per Oxygen Titration Protocol: Yes      To maintain SpO2 goal of: >= 90%      Notify MD of: Inability to achieve desired SpO2; Sudden change in patient status and requires 20% increase in FiO2; Patient requires >60% FiO2    Pulse Oximetry Continuous     Frequency: Continuous     Number of Occurrences: Until Specified    Routine tracheostomy care     Frequency: BID     Number of Occurrences: Until Specified       RECOMMENDATIONS    We recommend: RRT Recs: Continue POC per primary team.    ESCALATION        FOLLOW-UP    Please call back the Rapid Response RT, Tyrese Foster RRT at x [phone] for any questions or concerns.

## 2022-04-27 NOTE — PROGRESS NOTES
[facility]  [facility]  Progress Note    Patient Name: Fareed Richard Jr.  MRN: [id number]  Patient Class: IP- Inpatient   Admission Date: 4/19/2022  Length of Stay: 8 days  Attending Physician: Jordan Armenta MD  Primary Care Provider: Kodi Tubbs MD        Subjective:     Principal Problem:Acute on chronic respiratory failure        HPI:  Mr. 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 Per wife he has had several days of increasing lethargy, increased work of breathing and secretions. Yesterday she became particularly concerned when he became slightly less responsive and interactive. Wife also notes increased yellow thick secretions from trach site. Upon EMS arrival he was on his home 5 L by trach collar and saturating 93% with significant wheezing.  He received 1 DuoNeb by them.  He was also suctioned.     Currently patient is alert, following commands.  He is responding to yes no questions.  He denies any chest pain but does endorse shortness of breath and cough.  He denies any abdominal pain or pain elsewhere       ED course:   He was given 500ccs IVF as well as one time doses of vanc/cefepime/azithro. Labs significant for BNP of 1600, WBC 5k, K 5.6, Bicarb 35, VBG 7.26/87/25/40. Trop wnl. CXR with new cardiomegaly and pulmonary edema. Suctioned with return of copious secretions. ICU was consulted for acute hypoxemic respiratory failure and he will be admitted for further management.              Overview/Hospital Course:    Admitted to ICU, started on CAP coverage and solumedrol. Quickly weaned off vent after return of copious secretions on suction. Has been maintaining sats well on home O2. Rad/onc consulted for completion of radiology while inpatient. Pending culture sensitivity.    4/21 Transfer to hospital  medicine . 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  initially requiring vent but has now been stable on home trach collar for 24 hours. sats 93% on 8L via trach collar.  Awaiting cultures from sputum, on CAP coverage with ceftriaxone. completed Azithromycin. continue solumedrol 40mg IV daily.  rad/onc consulted this morning-he was supposed to complete radiation outpt but was admitted, attempting to set up for inpatient  4/22 changed to bolus GT feedings. s/p radiation oncology eval -on adjuvant chemoradiation, completed 31/33 fractions of RT.  renitiation of RT  inpatient when patient able to tolerate. sats 95% on trach collar 10/ fio2 60% . completed azithromycin.  started on Zosyn for possible aspiration  repeat CX ray-in the inferior hemithorax on the left side consistent with airspace consolidation/volume loss in the left lower lobe is again appreciated, but the lung zones are essentially stable since the prior exam and demonstrate no new areas of airspace consolidation or volume loss. respiratory culture 4/19 with pansensitive  pseudomonas.     4/23 intermittently refusing bolus GT feedings.stable on 10L/60% Fio2 . completed radiation sessions. off solumedrol  today. PT recs SN[facility]  4/24 K and P replaced   4/25 sodium 150. started on D5W . repeat renal panel in PM . oxygen tapered to 8L/ Fio2 35%   4/26 K of 3.2  replaced   4/27 stable on 8L/ Fio2 35% . mucous plug remvove by suction , wants glycopyrrolate PRN due to dry mouth            Review of Systems:   Pain scale:   Constitutional:  fever,  chills, headache, vision loss, hearing loss, weight loss, Generalized weakness, falls, loss of smell, loss of taste, poor appetite,  sore throat, voice change,immunocompromised state.   Respiratory: cough, shortness of breath.   Cardiovascular: chest pain, dizziness, palpitations, orthopnea,  swelling of feet, syncope  Gastrointestinal: nausea, vomiting, abdominal pain, diarrhea, black stool,  blood in stool, change in bowel habits  Genitourinary: hematuria, dysuria, urgency, frequency  Integument/Breast: rash,  pruritis  Hematologic/Lymphatic: easy bruising, lymphadenopathy  Musculoskeletal: arthralgias , myalgias, back pain, neck pain, knee pain  Neurological: confusion, seizures, tremors, slurred speech  Behavioral/Psych:  depression, anxiety, auditory or visual hallucinations     OBJECTIVE:     Physical Exam:  Body mass index is 20.92 kg/m².    Constitutional: Appears well-developed and well-nourished. thin built   Head: Normocephalic and atraumatic.   Neck: Normal range of motion. Neck supple. trach collar  Cardiovascular: Normal heart rate.  Regular heart rhythm.  Pulmonary/Chest: Effort normal.   Abdominal: No distension.  No tenderness, PEG tube insitu   Musculoskeletal: Normal range of motion. No edema.   Neurological: Alert and oriented to person, place, and time.   Skin: Skin is warm and dry.   Psychiatric: Normal mood and affect. Behavior is normal.                  Vital Signs  Temp: 97.7 °F (36.5 °C) (04/27/22 1515)  Pulse: 99 (04/27/22 1515)  Resp: 20 (04/27/22 1515)  BP: 109/67 (04/27/22 1515)  SpO2: 96 % (04/27/22 1515)     24 Hour VS Range    Temp:  [97 °F (36.1 °C)-98.4 °F (36.9 °C)]   Pulse:  []   Resp:  [16-22]   BP: (109-158)/(67-85)   SpO2:  [93 %-100 %]     Intake/Output Summary (Last 24 hours) at 4/27/2022 1528  Last data filed at 4/27/2022 1300  Gross per 24 hour   Intake 2050 ml   Output 1050 ml   Net 1000 ml         I/O This Shift:  I/O this shift:  In: 1700 [NG/GT:1700]  Out: 550 [Urine:550]    Wt Readings from Last 3 Encounters:   04/27/22 62.4 kg (137 lb 9.1 oz)   04/06/22 63 kg (138 lb 14.2 oz)   04/04/22 63 kg (139 lb)       I have personally reviewed the vitals and recorded Intake/Output     Laboratory/Diagnostic Data:    CBC/Anemia Labs: Coags:    Recent Labs    Lab 04/26/22  0711 04/26/22  0946 04/27/22  0530   WBC 5.24 4.57 5.51   HGB 10.0* 10.3* 10.7*   HCT 34.3* 34.9* 32.8*    247 203   MCV 98 98 91   RDW 16.9* 16.7* 16.8*    No results for input(s): PT, INR, APTT in the last 168 hours.     Chemistries: ABG:   Recent Labs   Lab 04/26/22  0711 04/26/22  0946 04/27/22  0530    145 140   K 3.8 3.7 4.1    101 103   CO2 36* 32* 27   BUN 20 19 22   CREATININE 0.7 0.7 0.7   CALCIUM 9.0 9.2 9.3   PROT 5.6* 6.2 6.0   BILITOT 0.3 0.3 0.2   ALKPHOS 73 73 69   ALT 18 18 17   AST 13 16 22   MG 2.0 2.0 2.0   PHOS 2.8 2.8 3.6    No results for input(s): PH, PCO2, PO2, HCO3, POCSATURATED, BE in the last 168 hours.     POCT Glucose: HbA1c:    No results for input(s): POCTGLUCOSE in the last 168 hours. No results found for: HGBA1C     Cardiac Enzymes: Ejection Fractions:    No results for input(s): CPK, CPKMB, MB, TROPONINI in the last 72 hours. EF   Date Value Ref Range Status   04/19/2022 60 % Final   12/30/2021 65 % Final          No results for input(s): COLORU, APPEARANCEUA, PHUR, SPECGRAV, PROTEINUA, GLUCUA, KETONESU, BILIRUBINUA, OCCULTUA, NITRITE, UROBILINOGEN, LEUKOCYTESUR, RBCUA, WBCUA, BACTERIA, SQUAMEPITHEL, HYALINECASTS in the last 48 hours.    Invalid input(s): WRIGHTSUR    Procalcitonin (ng/mL)   Date Value   04/19/2022 0.09     Lactate (Lactic Acid) (mmol/L)   Date Value   04/19/2022 1.0     BNP (pg/mL)   Date Value   04/22/2022 132 (H)   04/19/2022 1,607 (H)   03/21/2022 242 (H)   11/02/2019 260 (H)   12/27/2018 239 (H)     No results found for: CRP, SEDRATE  No results found for: DDIMER  Ferritin (ng/mL)   Date Value   02/16/2022 135     No results found for: LDH  Troponin I (ng/mL)   Date Value   04/19/2022 0.018   11/02/2019 0.016   12/27/2018 <0.006     No results found for this or any previous visit.  SARS-CoV2 (COVID-19) Qualitative PCR (no units)   Date Value   12/31/2021 Not Detected     SARS-CoV-2 RNA, Amplification, Qual (no units)   Date  Value   01/13/2022 Negative   01/03/2022 Negative     POC Rapid COVID (no units)   Date Value   04/19/2022 Negative       Microbiology labs for the last week  Microbiology Results (last 7 days)     Procedure Component Value Units Date/Time    Blood culture x two cultures. Draw prior to antibiotics. [[id number]] Collected: 04/19/22 0930    Order Status: Completed Specimen: Blood from Peripheral, Antecubital, Left Updated: 04/24/22 1012     Blood Culture, Routine No growth after 5 days.    Narrative:      Aerobic and anaerobic    Blood culture x two cultures. Draw prior to antibiotics. [[id number]] Collected: 04/19/22 0925    Order Status: Completed Specimen: Blood from Peripheral, Antecubital, Right Updated: 04/24/22 1012     Blood Culture, Routine No growth after 5 days.    Narrative:      Aerobic and anaerobic    Culture, Respiratory [[id number]]  (Abnormal)  (Susceptibility) Collected: 04/19/22 1119    Order Status: Completed Specimen: Respiratory from Sputum Updated: 04/23/22 1052     Respiratory Culture No S aureus isolated.      PSEUDOMONAS AERUGINOSA  Rare  Normal respiratory ishmael also present       Gram Stain (Respiratory) <10 epithelial cells per low power field.     Gram Stain (Respiratory) Rare WBC's     Gram Stain (Respiratory) Rare Gram positive cocci     Gram Stain (Respiratory) Rare Gram positive rods          Reviewed and noted in plan where applicable- Please see chart for full lab data.    Lines/Drains:       Peripheral IV - Single Lumen 04/19/22 0920 20 G Right Antecubital (Active)   Site Assessment Clean;Dry;Intact;No redness;No swelling;No drainage;No warmth 04/21/22 0701   Extremity Assessment Distal to IV No abnormal discoloration;No redness;No swelling;No warmth 04/21/22 0701   Line Status Infusing 04/21/22 0701   Dressing Status Clean;Dry;Intact 04/21/22 0701   Dressing Intervention Integrity maintained 04/21/22 0701   Dressing Change Due 04/23/22 04/21/22 0701   Site Change Due 04/23/22  04/21/22 0701   Reason Not Rotated Not due 04/21/22 0701   Number of days: 2            Peripheral IV - Single Lumen 04/19/22 1552 18 G Anterior;Distal;Left Forearm (Active)   Site Assessment Dry;Clean;Intact;No redness;No swelling;No warmth;No drainage 04/21/22 0701   Extremity Assessment Distal to IV No abnormal discoloration;No redness;No swelling;No warmth 04/21/22 0701   Line Status Flushed;Saline locked 04/21/22 0701   Dressing Status Clean;Dry;Intact 04/21/22 0701   Dressing Intervention Integrity maintained 04/21/22 0701   Dressing Change Due 04/23/22 04/21/22 0701   Site Change Due 04/23/22 04/21/22 0701   Reason Not Rotated Not due 04/21/22 0701   Number of days: 1            Gastrostomy/Enterostomy 01/04/22 Percutaneous endoscopic gastrostomy (PEG) LUQ (Active)   Securement secured to abdomen 04/21/22 0701   Interventions Prior to Feeding patency checked;residual checked 04/21/22 0701   Feeding Type intermittent;bolus 04/21/22 0701   Clamp Status/Tolerance clamped;no abdominal distention;no abdominal discomfort;no emesis;no nausea;no residual;no restlessness 04/21/22 0701   Feeding Action feeding continued 04/21/22 0305   Dressing dry and intact 04/21/22 0701   Insertion Site no redness;no warmth;no drainage;no tenderness;no swelling 04/21/22 0701   Site Care secured w/ tape 04/21/22 0305   Flush/Irrigation flushed w/;water;no resistance met 04/21/22 0701   Dressing Change Due 04/21/22 04/21/22 0701   Intake (mL) 120 mL 04/21/22 0701   Formula Name Impact Peptide 1.5 04/21/22 0305   Tube Feeding Intake (mL) 500 04/21/22 0701   Residual Amount (ml) 0 ml 04/21/22 0701   Number of days: 107       Imaging  ECG Results          EKG 12-lead (Final result)  Result time 04/19/22 17:42:02    Final result by Interface, Lab In [facility] (04/19/22 17:42:02)             Narrative:    Test Reason : R06.02,    Vent. Rate : 081 BPM     Atrial Rate : 081 BPM     P-R Int : 158 ms          QRS Dur : 092 ms      QT Int : 366  ms       P-R-T Axes : 061 -70 045 degrees     QTc Int : 425 ms    Normal sinus rhythm  Left axis deviation Now present  Incomplete right bundle branch block  Abnormal ECG  When compared with ECG of 10-MADONNA-2022 10:11,    Confirmed by DIONNE SINGH MD ([id number]) on 4/19/2022 5:41:52 PM    Referred By: [email or web address]   SELF           Confirmed By:DIONNE SINGH MD                            Results for orders placed during the hospital encounter of 04/19/22    Echo    Interpretation Summary  · The left ventricle is normal in size with normal systolic function. The estimated ejection fraction is 60%.  · Normal right ventricular size with normal right ventricular systolic function.  · Normal left ventricular diastolic function.  · The aortic root is mildly dilated.  · Mechanically ventilated; cannot use inferior caval vein diameter to estimate central venous pressure. The IVC is not enlarged and does collapse somewhat with the respiratory cycle, essentially ruling out venous hypertension.      US Lower Extremity Veins Bilateral  Narrative: EXAMINATION:  US LOWER EXTREMITY VEINS BILATERAL    CLINICAL HISTORY:  R/O DVT;    TECHNIQUE:  Duplex and color flow Doppler and dynamic compression was performed of the bilateral lower extremity veins was performed.    COMPARISON:  None    FINDINGS:  Right thigh veins: The common femoral, superficial femoral, popliteal, upper greater saphenous, and deep femoral veins are patent and free of thrombus. The veins are normally compressible and have normal phasic flow and augmentation response.    Right calf veins: The visualized calf veins are patent.    Left thigh veins: The common femoral, superficial femoral, popliteal, upper greater saphenous, and deep femoral veins are patent and free of thrombus. The veins are normally compressible and have normal phasic flow and augmentation response.    Left calf veins: The visualized calf veins are patent.  Impression: No evidence of deep venous  thrombosis in either lower extremity.    Electronically signed by resident: Anuradha Arthur  Date:    04/23/2022  Time:    17:54    Electronically signed by: Marcos Toth MD  Date:    04/23/2022  Time:    18:01      Labs, Imaging, EKG and Diagnostic results from 4/27/2022 were reviewed.    Medications:  Medication list was reviewed and changes noted under Assessment/Plan.  No current facility-administered medications on file prior to encounter.     Current Outpatient Medications on File Prior to Encounter   Medication Sig Dispense Refill    amLODIPine (NORVASC) 10 MG tablet Take 10 mg by mouth once daily.      atorvastatin (LIPITOR) 20 MG tablet 1 tablet (20 mg total) by Per G Tube route once daily. 90 tablet 3    glycopyrrolate (CUVPOSA) 1 mg/5 mL (0.2 mg/mL) Soln Take 5 mLs (1 mg total) by mouth 3 (three) times daily as needed (TO REDUCE SECRETIONS). 473 mL 1    acetaminophen (TYLENOL) 325 MG tablet 2 tablets (650 mg total) by Per G Tube route every 6 (six) hours.  0    albuterol-ipratropium (DUO-NEB) 2.5 mg-0.5 mg/3 mL nebulizer solution Take 3 mLs by nebulization every 4 (four) hours as needed for Wheezing. Rescue 75 mL 0    aspirin 81 MG Chew 1 tablet (81 mg total) by Per G Tube route once daily.  0    bisacodyL (DULCOLAX) 10 mg Supp Place 1 suppository (10 mg total) rectally daily as needed.  0    clopidogreL (PLAVIX) 75 mg tablet 1 tablet (75 mg total) by Per G Tube route once daily. 30 tablet 11    duke's soln (benadryl 30 mL, mylanta 30 mL, LIDOcaine 30 mL, nystatin 30 mL) 120mL Take 10 mLs by mouth 4 (four) times daily as needed (mucositis). 360 mL 0    enoxaparin (LOVENOX) 40 mg/0.4 mL Syrg Inject 0.4 mLs (40 mg total) into the skin once daily.      folic acid (FOLVITE) 1 MG tablet 1 tablet (1 mg total) by Per G Tube route once daily.  0    guaiFENesin 200 mg/5 mL Liqd Take 400 mg by mouth every 4 (four) hours as needed (for secretions). 473 mL 3    losartan (COZAAR) 50 MG tablet 1 tablet (50  mg total) by Per G Tube route once daily. 90 tablet 3    melatonin (MELATIN) 3 mg tablet 2 tablets (6 mg total) by Per G Tube route nightly.  0    metoprolol tartrate (LOPRESSOR) 100 MG tablet 1 tablet (100 mg total) by Per G Tube route 2 (two) times daily. 60 tablet 11    ondansetron (ZOFRAN-ODT) 8 MG TbDL Take 1 tablet (8 mg total) by mouth every 8 (eight) hours as needed (nausea). 30 tablet 1    oxyCODONE (ROXICODONE) 5 mg/5 mL Soln 5 mLs (5 mg total) by Per G Tube route every 4 (four) hours as needed (Pain). 150 mL 0    polyethylene glycol (GLYCOLAX) 17 gram PwPk 17 g by Per G Tube route 2 (two) times daily.  0    senna-docusate 8.6-50 mg (PERICOLACE) 8.6-50 mg per tablet 1 tablet by Per G Tube route 2 (two) times daily.      sodium chloride (OCEAN) 0.65 % nasal spray 1 spray by Nasal route as needed for Congestion.  12    sodium chloride 3% 3 % nebulizer solution Take 4 mLs by nebulization every 8 (eight) hours.      thiamine 100 MG tablet 1 tablet (100 mg total) by Per G Tube route once daily.       Scheduled Medications:  albuterol-ipratropium, 3 mL, Nebulization, Q4H  aspirin, 81 mg, Per G Tube, Daily  atorvastatin, 20 mg, Per G Tube, Daily  clopidogreL, 75 mg, Per G Tube, Daily  enoxaparin, 40 mg, Subcutaneous, Daily  folic acid, 1 mg, Per G Tube, Daily  melatonin, 6 mg, Per G Tube, Nightly  piperacillin-tazobactam (ZOSYN) IVPB, 4.5 g, Intravenous, Q8H  polyethylene glycol, 17 g, Per G Tube, BID  sodium chloride 3%, 4 mL, Nebulization, Q8H  thiamine, 100 mg, Per G Tube, Daily      PRN: sodium chloride, albuterol-ipratropium, bisacodyL, duke's soln (benadryl 30 mL, mylanta 30 mL, LIDOcaine 30 mL, nystatin 30 mL) 120 mL, glycopyrrolate, ondansetron, oxyCODONE, sodium chloride, sodium chloride 0.9%  Infusions:     Estimated Creatinine Clearance: 83 mL/min (based on SCr of 0.7 mg/dL).    Assessment/Plan:      * Acute on chronic respiratory failure    Pt with SCC of the tongue s/p total glossectomy,  chemowith cisplatin, and nearing completion of radiation with trach, COPD, asthma, HTN, CAD s/p PCI presenting for acute on chronic hypoxemic respiratory failure. He is on 5L with trach collar at home. He has had increased yellow secretions, work of breathing and SOB for the past several days. BNP elevated to 1600 on admission with pulmonary edema and new cardiomegaly on CXR. Trop wnl. Bicarb is elevated suggesting chronic hypercapnia. Suctioning has cleared thick mucous. He was able to be weaned down to his home O2, however after ~ 20 minutes he desats again, requiring deep suction with resolution of hypoxia. Now stable on home O2. Diuresed -3L        -Pt stable on home O2 for 24 hours, ready to stepdown to floor  - CAP coverage with ceftriaxone/azithro  - Methylprednisolone 40 mg x5 days for possible element of COPD exacerbation  - Duonebs q4  - Hypertonic saline nebs  - Chest PT  - Suction PRN    4/21 Transfer to hospital medicine . 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  initially requiring vent but has now been stable on home trach collar for 24 hours. sats 93% on 8L via trach collar.  Awaiting cultures from sputum, on CAP coverage with ceftriaxone. completed Azithromycin. continue solumedrol 40mg IV daily.   4/22 sats 95% on trach collar 10/ fio2 60% . completed azithromycin.   started on Zosyn for possible aspiration  repeat CX ray-in the inferior hemithorax on the left side consistent with airspace consolidation/volume loss in the left lower lobe is again appreciated, but the lung zones are essentially stable since the prior exam and demonstrate no new areas of airspace consolidation or volume loss.  respiratory culture 4/19 with pansensitive pseudomonas.   4/25   oxygen tapered to 8L/ Fio2 35%        Aspiration pneumonia    4/22 sats 95% on trach collar 10/ fio2 60% . completed  azithromycin.   started on Zosyn for possible aspiration  repeat CX ray-in the inferior hemithorax on the left side consistent with airspace consolidation/volume loss in the left lower lobe is again appreciated, but the lung zones are essentially stable since the prior exam and demonstrate no new areas of airspace consolidation or volume loss.  respiratory culture 4/19 with pseudomonas.           Dysphagia  Nutrition consulted. Most recent weight and BMI monitored-          Malnutrition (Moderate to Severe)  Weight Loss (Malnutrition): greater than 10% in 6 months                 Measurements:  Wt Readings from Last 1 Encounters:   04/20/22 61.9 kg (136 lb 7.4 oz)   Body mass index is 20.75 kg/m².    Recommendations: Recommendation/Intervention: 1.  Goals: Meet % EEN, EPN by RD f/u date    Patient has been screened and assessed by RD. RD will follow patient.    4/21 secondary to above. on G tube feedings   4/22 changed to bolus GT feedings.   4/23 intermittently refusing bolus GT feedings.      Hypokalemia  replaced       Hypernatremia  4/25 sodium 150. started on D5W . repeat renal panel in PM       Chronic respiratory failure with hypoxia, on home O2 therapy  secondary to COPD. on 5L oxygen  at home    Protein-calorie malnutrition  Nutrition consulted. Most recent weight and BMI monitored-          Malnutrition (Moderate to Severe)  Weight Loss (Malnutrition): greater than 10% in 6 months              Measurements:  Wt Readings from Last 1 Encounters:   04/20/22 61.9 kg (136 lb 7.4 oz)   Body mass index is 20.75 kg/m².    Recommendations: Recommendation/Intervention: 1.  Goals: Meet % EEN, EPN by RD f/u date    Patient has been screened and assessed by RD. RD will follow patient.      Normocytic anemia    Patient's with Normocytic anemia.. Hemoglobin stable. Etiology likely due to chronic disease .  Current CBC reviewed-  Recent Labs   Lab 04/19/22  1442 04/20/22  0531 04/21/22  0333   HGB 6.5* 9.2*  9.8*     Monitor CBC and transfuse if H/H drops below 7/21.       Other hyperlipidemia  Continue home statin         Primary hypertension    Chronic, controlled.  Latest blood pressure and vitals reviewed-   Temp:  [96.9 °F (36.1 °C)-99.2 °F (37.3 °C)]   Pulse:  []   Resp:  [18-61]   BP: ()/(62-95)   SpO2:  [91 %-99 %] .   Home meds for hypertension were reviewed- amlodipine, losartan and metoprolol  held for now  PRN meds if BP> 180/110 mm HG      Coronary artery disease involving native coronary artery of native heart without angina pectoris    Continue ASA, plavix, and statin       Squamous cell cancer of tongue    12/15/21 FNA left neck mass : squamous cell carcinoma, p16 negative  1/4/22 total glossectomy, bilateral neck dissection, bilateral cervical facial advancement flaps and anterolateral thigh free flap reconstruction of his glossectomy defect.  Final path :  [id number] (+microscopic SALINAS, single contralateral node), superior soft tissue margin of left neck with tumor.  2/28/22 start chemoradiation  Finished chemo with cisplatin 4/6. Was going to complete final dose of radiation tomorrow.     -Rad/onc consulted for radiation while inpatient  4/22  s/p radiation oncology eval -on adjuvant chemoradiation, completed 31/33 fractions of RT.  renitiation of RT  inpatient when patient able to tolerate.    completed radiation        VTE Risk Mitigation (From admission, onward)         Ordered     enoxaparin injection 40 mg  Daily         04/19/22 1123     IP VTE HIGH RISK PATIENT  Once         04/19/22 1123     Place sequential compression device  Until discontinued         04/19/22 1123                Discharge Planning   NISA: 4/28/2022     Code Status: Full Code   Is the patient medically ready for discharge?: No    Reason for patient still in hospital (select all that apply): Treatment  Discharge Plan A: Home Health   Discharge Delays: (Abnormal) Other              Jordan Armenta MD  Department of  [facility] Medicine   [facility] Card[facility] Medical [facility]

## 2022-04-28 PROBLEM — Z71.89 GOALS OF CARE, COUNSELING/DISCUSSION: Status: ACTIVE | Noted: 2022-04-28

## 2022-04-28 LAB
ALBUMIN SERPL BCP-MCNC: 2.5 G/DL (ref 3.5–5.2)
ALP SERPL-CCNC: 72 U/L (ref 55–135)
ALT SERPL W/O P-5'-P-CCNC: 17 U/L (ref 10–44)
ANION GAP SERPL CALC-SCNC: 9 MMOL/L (ref 8–16)
AST SERPL-CCNC: 15 U/L (ref 10–40)
BASOPHILS # BLD AUTO: 0.03 K/UL (ref 0–0.2)
BASOPHILS NFR BLD: 0.7 % (ref 0–1.9)
BILIRUB SERPL-MCNC: 0.3 MG/DL (ref 0.1–1)
BUN SERPL-MCNC: 18 MG/DL (ref 8–23)
CALCIUM SERPL-MCNC: 9.2 MG/DL (ref 8.7–10.5)
CHLORIDE SERPL-SCNC: 98 MMOL/L (ref 95–110)
CO2 SERPL-SCNC: 32 MMOL/L (ref 23–29)
CREAT SERPL-MCNC: 0.7 MG/DL (ref 0.5–1.4)
DIFFERENTIAL METHOD: ABNORMAL
EOSINOPHIL # BLD AUTO: 0.1 K/UL (ref 0–0.5)
EOSINOPHIL NFR BLD: 1.1 % (ref 0–8)
ERYTHROCYTE [DISTWIDTH] IN BLOOD BY AUTOMATED COUNT: 16.9 % (ref 11.5–14.5)
EST. GFR  (AFRICAN AMERICAN): >60 ML/MIN/1.73 M^2
EST. GFR  (NON AFRICAN AMERICAN): >60 ML/MIN/1.73 M^2
GLUCOSE SERPL-MCNC: 137 MG/DL (ref 70–110)
HCT VFR BLD AUTO: 34.7 % (ref 40–54)
HGB BLD-MCNC: 10.8 G/DL (ref 14–18)
IMM GRANULOCYTES # BLD AUTO: 0.02 K/UL (ref 0–0.04)
IMM GRANULOCYTES NFR BLD AUTO: 0.4 % (ref 0–0.5)
LYMPHOCYTES # BLD AUTO: 0.6 K/UL (ref 1–4.8)
LYMPHOCYTES NFR BLD: 12.5 % (ref 18–48)
MAGNESIUM SERPL-MCNC: 1.9 MG/DL (ref 1.6–2.6)
MCH RBC QN AUTO: 29.8 PG (ref 27–31)
MCHC RBC AUTO-ENTMCNC: 31.1 G/DL (ref 32–36)
MCV RBC AUTO: 96 FL (ref 82–98)
MONOCYTES # BLD AUTO: 0.5 K/UL (ref 0.3–1)
MONOCYTES NFR BLD: 10.2 % (ref 4–15)
NEUTROPHILS # BLD AUTO: 3.4 K/UL (ref 1.8–7.7)
NEUTROPHILS NFR BLD: 75.1 % (ref 38–73)
NRBC BLD-RTO: 0 /100 WBC
PHOSPHATE SERPL-MCNC: 3.2 MG/DL (ref 2.7–4.5)
PLATELET # BLD AUTO: 210 K/UL (ref 150–450)
PMV BLD AUTO: 9.3 FL (ref 9.2–12.9)
POTASSIUM SERPL-SCNC: 3.6 MMOL/L (ref 3.5–5.1)
PROT SERPL-MCNC: 6.1 G/DL (ref 6–8.4)
RBC # BLD AUTO: 3.62 M/UL (ref 4.6–6.2)
SODIUM SERPL-SCNC: 139 MMOL/L (ref 136–145)
WBC # BLD AUTO: 4.49 K/UL (ref 3.9–12.7)

## 2022-04-28 PROCEDURE — 99900026 HC AIRWAY MAINTENANCE (STAT)

## 2022-04-28 PROCEDURE — 99233 SBSQ HOSP IP/OBS HIGH 50: CPT | Mod: ,,, | Performed by: HOSPITALIST

## 2022-04-28 PROCEDURE — 84100 ASSAY OF PHOSPHORUS: CPT | Performed by: HOSPITALIST

## 2022-04-28 PROCEDURE — 63600175 PHARM REV CODE 636 W HCPCS: Performed by: INTERNAL MEDICINE

## 2022-04-28 PROCEDURE — 25000003 PHARM REV CODE 250: Performed by: STUDENT IN AN ORGANIZED HEALTH CARE EDUCATION/TRAINING PROGRAM

## 2022-04-28 PROCEDURE — 25000242 PHARM REV CODE 250 ALT 637 W/ HCPCS: Performed by: HOSPITALIST

## 2022-04-28 PROCEDURE — 99900035 HC TECH TIME PER 15 MIN (STAT)

## 2022-04-28 PROCEDURE — 94640 AIRWAY INHALATION TREATMENT: CPT

## 2022-04-28 PROCEDURE — 20600001 HC STEP DOWN PRIVATE ROOM

## 2022-04-28 PROCEDURE — 85025 COMPLETE CBC W/AUTO DIFF WBC: CPT | Performed by: HOSPITALIST

## 2022-04-28 PROCEDURE — 94761 N-INVAS EAR/PLS OXIMETRY MLT: CPT

## 2022-04-28 PROCEDURE — 25000003 PHARM REV CODE 250: Performed by: INTERNAL MEDICINE

## 2022-04-28 PROCEDURE — 25000242 PHARM REV CODE 250 ALT 637 W/ HCPCS: Performed by: INTERNAL MEDICINE

## 2022-04-28 PROCEDURE — 83735 ASSAY OF MAGNESIUM: CPT | Performed by: HOSPITALIST

## 2022-04-28 PROCEDURE — 27000221 HC OXYGEN, UP TO 24 HOURS

## 2022-04-28 PROCEDURE — 99233 PR SUBSEQUENT HOSPITAL CARE,LEVL III: ICD-10-PCS | Mod: ,,, | Performed by: HOSPITALIST

## 2022-04-28 PROCEDURE — 80053 COMPREHEN METABOLIC PANEL: CPT | Performed by: HOSPITALIST

## 2022-04-28 PROCEDURE — 36415 COLL VENOUS BLD VENIPUNCTURE: CPT | Performed by: HOSPITALIST

## 2022-04-28 PROCEDURE — 77336 RADIATION PHYSICS CONSULT: CPT | Performed by: STUDENT IN AN ORGANIZED HEALTH CARE EDUCATION/TRAINING PROGRAM

## 2022-04-28 PROCEDURE — 27200966 HC CLOSED SUCTION SYSTEM

## 2022-04-28 RX ADMIN — PIPERACILLIN SODIUM AND TAZOBACTAM SODIUM 4.5 G: 4; .5 INJECTION, POWDER, FOR SOLUTION INTRAVENOUS at 11:04

## 2022-04-28 RX ADMIN — FOLIC ACID 1 MG: 1 TABLET ORAL at 08:04

## 2022-04-28 RX ADMIN — GLYCOPYRROLATE 1 MG: 1 LIQUID ORAL at 09:04

## 2022-04-28 RX ADMIN — ATORVASTATIN CALCIUM 20 MG: 20 TABLET, FILM COATED ORAL at 08:04

## 2022-04-28 RX ADMIN — IPRATROPIUM BROMIDE AND ALBUTEROL SULFATE 3 ML: 2.5; .5 SOLUTION RESPIRATORY (INHALATION) at 08:04

## 2022-04-28 RX ADMIN — IPRATROPIUM BROMIDE AND ALBUTEROL SULFATE 3 ML: 2.5; .5 SOLUTION RESPIRATORY (INHALATION) at 01:04

## 2022-04-28 RX ADMIN — MELATONIN TAB 3 MG 6 MG: 3 TAB at 08:04

## 2022-04-28 RX ADMIN — IPRATROPIUM BROMIDE AND ALBUTEROL SULFATE 3 ML: 2.5; .5 SOLUTION RESPIRATORY (INHALATION) at 11:04

## 2022-04-28 RX ADMIN — IPRATROPIUM BROMIDE AND ALBUTEROL SULFATE 3 ML: 2.5; .5 SOLUTION RESPIRATORY (INHALATION) at 04:04

## 2022-04-28 RX ADMIN — SODIUM CHLORIDE SOLN NEBU 3% 4 ML: 3 NEBU SOLN at 11:04

## 2022-04-28 RX ADMIN — GLYCOPYRROLATE 1 MG: 1 LIQUID ORAL at 08:04

## 2022-04-28 RX ADMIN — CLOPIDOGREL 75 MG: 75 TABLET, FILM COATED ORAL at 08:04

## 2022-04-28 RX ADMIN — ENOXAPARIN SODIUM 40 MG: 40 INJECTION SUBCUTANEOUS at 04:04

## 2022-04-28 RX ADMIN — PIPERACILLIN SODIUM AND TAZOBACTAM SODIUM 4.5 G: 4; .5 INJECTION, POWDER, FOR SOLUTION INTRAVENOUS at 03:04

## 2022-04-28 RX ADMIN — GLYCOPYRROLATE 1 MG: 1 LIQUID ORAL at 12:04

## 2022-04-28 RX ADMIN — SODIUM CHLORIDE SOLN NEBU 3% 4 ML: 3 NEBU SOLN at 05:04

## 2022-04-28 RX ADMIN — IPRATROPIUM BROMIDE AND ALBUTEROL SULFATE 3 ML: 2.5; .5 SOLUTION RESPIRATORY (INHALATION) at 09:04

## 2022-04-28 RX ADMIN — IPRATROPIUM BROMIDE AND ALBUTEROL SULFATE 3 ML: 2.5; .5 SOLUTION RESPIRATORY (INHALATION) at 05:04

## 2022-04-28 RX ADMIN — PIPERACILLIN SODIUM AND TAZOBACTAM SODIUM 4.5 G: 4; .5 INJECTION, POWDER, FOR SOLUTION INTRAVENOUS at 08:04

## 2022-04-28 RX ADMIN — ASPIRIN 81 MG CHEWABLE TABLET 81 MG: 81 TABLET CHEWABLE at 08:04

## 2022-04-28 RX ADMIN — THIAMINE HCL TAB 100 MG 100 MG: 100 TAB at 08:04

## 2022-04-28 RX ADMIN — SODIUM CHLORIDE SOLN NEBU 3% 4 ML: 3 NEBU SOLN at 09:04

## 2022-04-28 NOTE — ASSESSMENT & PLAN NOTE
4/22 sats 95% on trach collar 10/ fio2 60% . completed azithromycin.   started on Zosyn for possible aspiration  repeat CX ray-in the inferior hemithorax on the left side consistent with airspace consolidation/volume loss in the left lower lobe is again appreciated, but the lung zones are essentially stable since the prior exam and demonstrate no new areas of airspace consolidation or volume loss.  respiratory culture 4/19 with pseudomonas.   4/28 - still on zosyn

## 2022-04-28 NOTE — RESPIRATORY THERAPY
RAPID RESPONSE RESPIRATORY THERAPY PROACTIVE NOTE           Time of visit: 922     Code Status: Full Code   : 1950  Bed: 8094/8094 A:   MRN: [id number]  Time spent at the bedside: < 15 min    SITUATION    Evaluated patient for: LDA Check     BACKGROUND    Patient has a past medical history of Cancer, COPD (chronic obstructive pulmonary disease), Hyperlipidemia, Hypertension, and Pseudoaneurysm.  Clinically Significant Surgical Hx: tracheostomy    24 Hours Vitals Range:  Temp:  [96 °F (35.6 °C)-98.9 °F (37.2 °C)]   Pulse:  []   Resp:  [16-29]   BP: (109-154)/(67-81)   SpO2:  [92 %-99 %]     Labs:    Recent Labs     22  0946 22  0530 22  0937    140 139   K 3.7 4.1 3.6    103 98   CO2 32* 27 32*   CREATININE 0.7 0.7 0.7   * 127* 137*   PHOS 2.8 3.6 3.2   MG 2.0 2.0 1.9        No results for input(s): PH, PCO2, PO2, HCO3, POCSATURATED, BE in the last 72 hours.    ASSESSMENT/INTERVENTIONS    Upon arrival in room Pt resting comfortable in bed with no respiratory needs at this time.    Last VS   Temp: 97.4 °F (36.3 °C) (1122)  Pulse: 93 (1122)  Resp: 20 (1122)  BP: 112/74 (1122)  SpO2: 95 % (1122)    Level of Consciousness: Level of Consciousness (AVPU): alert  Respiratory Effort: Respiratory Effort: Normal, Unlabored Expansion/Accessory Muscle Usage: Expansion/Accessory Muscles/Retractions: no use of accessory muscles, no retractions, expansion symmetric  All Lung Field Breath Sounds: All Lung Fields Breath Sounds: Posterior:, Anterior:  DEE Breath Sounds: diminished  LLL Breath Sounds: diminished  RUL Breath Sounds: diminished  RML Breath Sounds: diminished  RLL Breath Sounds: diminished  O2 Device/Concentration: Flow (L/min): 8, Oxygen Concentration (%): 35, O2 Device (Oxygen Therapy): Trach Collar  Surgical airway: Yes, Type: Shiley Size: 6, uncuffed  Ambu at bedside: Ambu bag with the patient?: Yes, Adult Ambu     Active Orders    Respiratory Care    Chest physiotherapy TID     Frequency: TID     Number of Occurrences: Until Specified     Order Questions:      Indications: COPIOUS SPUTUM PRODUCTION      Indications: ATELECTASIS PROPHYLAXIS      Indications: ATELECTASIS NONRESPONSIVE TO TX    Inhalation Treatment Q4H     Frequency: Q4H     Number of Occurrences: Until Specified    Oxygen Continuous     Frequency: Continuous     Number of Occurrences: Until Specified     Order Questions:      Device type: Low flow      Device: Trach Collar (Comment: 8L)      FiO2%: 35%      Titrate O2 per Oxygen Titration Protocol: Yes      To maintain SpO2 goal of: >= 90%      Notify MD of: Inability to achieve desired SpO2; Sudden change in patient status and requires 20% increase in FiO2; Patient requires >60% FiO2    Pulse Oximetry Continuous     Frequency: Continuous     Number of Occurrences: Until Specified    Routine tracheostomy care     Frequency: BID     Number of Occurrences: Until Specified       RECOMMENDATIONS    We recommend: RRT Recs: Continue POC per primary team.    ESCALATION        FOLLOW-UP    Please call back the Rapid Response RT, Tyrese Foster RRT at x [phone] for any questions or concerns.

## 2022-04-28 NOTE — ASSESSMENT & PLAN NOTE
Nutrition consulted. Most recent weight and BMI monitored-          Malnutrition (Moderate to Severe)  Weight Loss (Malnutrition): greater than 10% in 6 months                 Measurements:  Wt Readings from Last 1 Encounters:   04/27/22 62.4 kg (137 lb 9.1 oz)   Body mass index is 20.92 kg/m².    Recommendations: Recommendation/Intervention: 1.  Goals: Meet % EEN, EPN by RD f/u date    Patient has been screened and assessed by RD. RD will follow patient.    4/21 secondary to above. on G tube feedings   4/22 changed to bolus GT feedings.   4/28 - pt noted to sometimes refuse TFs.

## 2022-04-28 NOTE — PLAN OF CARE
SW spoke to pt's wife, Bridgett [phone], to discuss discharge plan.  Pt and pt's wife does not want SNF and would prefer to go home with HH.  SW verified with pt's wife Trach supplies are provided by [employer] and Tube feeds provided by [facility].  SW advised pt's wife [facility] will not be able to [facility] pt back as they are not in network with pt's insurance plan.    DANIELE will send referral to [facility] to see if they are able to accept pt for HH services.  Order to follow and will be sent to N for approval.      Milady Thomson LMSW  PRN-  [facility]  Ext. [phone]

## 2022-04-28 NOTE — SUBJECTIVE & OBJECTIVE
Interval History: see above    Review of Systems   Constitutional:  Negative for activity change, appetite change, chills, fatigue and fever.        Trach and PEG   HENT:  Negative for congestion, facial swelling, hearing loss, nosebleeds, sore throat and trouble swallowing.         Denies pain   Eyes:  Negative for pain and redness.   Respiratory:  Negative for apnea, cough, choking, chest tightness, shortness of breath, wheezing and stridor.    Cardiovascular:  Negative for chest pain, palpitations and leg swelling.   Gastrointestinal:  Negative for abdominal distention, abdominal pain, blood in stool, constipation, diarrhea, nausea and vomiting.   Genitourinary:  Negative for difficulty urinating, dysuria, flank pain, hematuria and urgency.   Musculoskeletal:  Negative for arthralgias, back pain, gait problem, neck pain and neck stiffness.   Skin:  Negative for color change, pallor and rash.   Neurological:  Negative for dizziness, tremors, syncope, facial asymmetry, weakness, light-headedness, numbness and headaches.   Psychiatric/Behavioral:  Negative for agitation, behavioral problems, confusion, decreased concentration, hallucinations, self-injury and sleep disturbance. The patient is not nervous/anxious and is not hyperactive.    Objective:     Vital Signs (Most Recent):  Temp: 98.9 °F (37.2 °C) (04/28/22 0758)  Pulse: 94 (04/28/22 0912)  Resp: 18 (04/28/22 0912)  BP: (!) 140/81 (04/28/22 0758)  SpO2: (!) 92 % (04/28/22 0912)   Vital Signs (24h Range):  Temp:  [96 °F (35.6 °C)-98.9 °F (37.2 °C)] 98.9 °F (37.2 °C)  Pulse:  [] 94  Resp:  [16-29] 18  SpO2:  [92 %-99 %] 92 %  BP: (109-154)/(67-81) 140/81     Weight: 62.4 kg (137 lb 9.1 oz)  Body mass index is 20.92 kg/m².    Intake/Output Summary (Last 24 hours) at 4/28/2022 1008  Last data filed at 4/28/2022 0937  Gross per 24 hour   Intake 1825 ml   Output 850 ml   Net 975 ml      Physical Exam  Constitutional:       General: He is not in acute  distress.     Appearance: Normal appearance. He is not ill-appearing or diaphoretic.      Comments: Frail elderly, trach and PEG   HENT:      Head: Normocephalic and atraumatic.      Comments: Thin in the face     Nose: Nose normal.      Mouth/Throat:      Mouth: Mucous membranes are moist.      Pharynx: Oropharynx is clear.   Eyes:      General: No scleral icterus.     Extraocular Movements: Extraocular movements intact.      Conjunctiva/sclera: Conjunctivae normal.      Pupils: Pupils are equal, round, and reactive to light.   Cardiovascular:      Rate and Rhythm: Normal rate and regular rhythm.      Pulses: Normal pulses.      Heart sounds: Normal heart sounds.   Pulmonary:      Effort: Pulmonary effort is normal. No respiratory distress.      Breath sounds: Normal breath sounds. No wheezing, rhonchi or rales.   Abdominal:      General: Abdomen is flat. Bowel sounds are normal. There is no distension.      Palpations: Abdomen is soft.      Tenderness: There is no abdominal tenderness. There is no right CVA tenderness or guarding.   Musculoskeletal:         General: No swelling, tenderness or deformity. Normal range of motion.      Cervical back: Normal range of motion and neck supple. No rigidity or tenderness.   Skin:     General: Skin is warm and dry.      Coloration: Skin is not jaundiced.      Findings: No rash.   Neurological:      General: No focal deficit present.      Mental Status: He is alert and oriented to person, place, and time. Mental status is at baseline.      Motor: No weakness.   Psychiatric:         Mood and Affect: Mood normal.         Behavior: Behavior normal.         Thought Content: Thought content normal.         Judgment: Judgment normal.       Significant Labs: All pertinent labs within the past 24 hours have been reviewed.  CBC:   Recent Labs   Lab 04/27/22  0530   WBC 5.51   HGB 10.7*   HCT 32.8*        CMP:   Recent Labs   Lab 04/27/22  0530      K 4.1      CO2 27    *   BUN 22   CREATININE 0.7   CALCIUM 9.3   PROT 6.0   ALBUMIN 2.5*   BILITOT 0.2   ALKPHOS 69   AST 22   ALT 17   ANIONGAP 10   EGFRNONAA >60.0       Significant Imaging: I have reviewed all pertinent imaging results/findings within the past 24 hours.

## 2022-04-28 NOTE — PROGRESS NOTES
[facility] - Tele[facility] Medicine  Progress Note    Patient Name: Fareed Richard Jr.  MRN: [id number]  Patient Class: IP- Inpatient   Admission Date: 4/19/2022  Length of Stay: 9 days  Attending Physician: Pam Bush MD  Primary Care Provider: Kodi Tubbs MD        Subjective:     Principal Problem:Acute on chronic respiratory failure        HPI:  Mr. 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 Per wife he has had several days of increasing lethargy, increased work of breathing and secretions. Yesterday she became particularly concerned when he became slightly less responsive and interactive. Wife also notes increased yellow thick secretions from trach site. Upon EMS arrival he was on his home 5 L by trach collar and saturating 93% with significant wheezing.  He received 1 DuoNeb by them.  He was also suctioned.     Currently patient is alert, following commands.  He is responding to yes no questions.  He denies any chest pain but does endorse shortness of breath and cough.  He denies any abdominal pain or pain elsewhere       ED course:   He was given 500ccs IVF as well as one time doses of vanc/cefepime/azithro. Labs significant for BNP of 1600, WBC 5k, K 5.6, Bicarb 35, VBG 7.26/87/25/40. Trop wnl. CXR with new cardiomegaly and pulmonary edema. Suctioned with return of copious secretions. ICU was consulted for acute hypoxemic respiratory failure and he will be admitted for further management.              Overview/Hospital Course:    Admitted to ICU, started on CAP coverage and solumedrol. Quickly weaned off vent after return of copious secretions on suction. Has been maintaining sats well on home O2. Rad/onc consulted for completion of radiology while inpatient. Pending culture sensitivity.    4/21 Transfer to hospital  medicine . 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  initially requiring vent but has now been stable on home trach collar for 24 hours. sats 93% on 8L via trach collar.  Awaiting cultures from sputum, on CAP coverage with ceftriaxone. completed Azithromycin. continue solumedrol 40mg IV daily.  rad/onc consulted this morning-he was supposed to complete radiation outpt but was admitted, attempting to set up for inpatient  4/22 changed to bolus GT feedings. s/p radiation oncology eval -on adjuvant chemoradiation, completed 31/33 fractions of RT.  renitiation of RT  inpatient when patient able to tolerate. sats 95% on trach collar 10/ fio2 60% . completed azithromycin.  started on Zosyn for possible aspiration  repeat CX ray-in the inferior hemithorax on the left side consistent with airspace consolidation/volume loss in the left lower lobe is again appreciated, but the lung zones are essentially stable since the prior exam and demonstrate no new areas of airspace consolidation or volume loss. respiratory culture 4/19 with pansensitive  pseudomonas.     4/23 intermittently refusing bolus GT feedings.stable on 10L/60% Fio2 . completed radiation sessions. off solumedrol  today. PT recs SNF  4/24 K and P replaced   4/25 sodium 150. started on D5W . repeat renal panel in PM . oxygen tapered to 8L/ Fio2 35%   4/26 K of 3.2  replaced   4/27 stable on 8L/ Fio2 35% . mucous plug remvove by suction , wants glycopyrrolate PRN due to dry mouth   Interval History:   4/28 - has trach, G tube, NPO, completed radiation treatments, 92% on 8 liters,+ stool and urinations         Interval History: see above    Review of Systems   Constitutional:  Negative for activity change, appetite change, chills, fatigue and fever.        Trach and PEG   HENT:  Negative for congestion, facial swelling, hearing loss,  nosebleeds, sore throat and trouble swallowing.    Eyes:  Negative for pain and redness.   Respiratory:  Negative for apnea, cough, choking, chest tightness, shortness of breath, wheezing and stridor.    Cardiovascular:  Negative for chest pain, palpitations and leg swelling.   Gastrointestinal:  Negative for abdominal distention, abdominal pain, blood in stool, constipation, diarrhea, nausea and vomiting.   Endocrine: Negative for cold intolerance, heat intolerance and polydipsia.   Genitourinary:  Negative for difficulty urinating, dysuria, flank pain, hematuria and urgency.   Musculoskeletal:  Negative for arthralgias, back pain, gait problem, neck pain and neck stiffness.   Skin:  Negative for color change, pallor and rash.   Neurological:  Negative for dizziness, tremors, syncope, facial asymmetry, weakness, light-headedness, numbness and headaches.   Psychiatric/Behavioral:  Negative for agitation, behavioral problems, confusion, decreased concentration, hallucinations, self-injury and sleep disturbance. The patient is not nervous/anxious and is not hyperactive.    Objective:     Vital Signs (Most Recent):  Temp: 98.9 °F (37.2 °C) (04/28/22 0758)  Pulse: 94 (04/28/22 0912)  Resp: 18 (04/28/22 0912)  BP: (!) 140/81 (04/28/22 0758)  SpO2: (!) 92 % (04/28/22 0912)   Vital Signs (24h Range):  Temp:  [96 °F (35.6 °C)-98.9 °F (37.2 °C)] 98.9 °F (37.2 °C)  Pulse:  [] 94  Resp:  [16-29] 18  SpO2:  [92 %-99 %] 92 %  BP: (109-154)/(67-81) 140/81     Weight: 62.4 kg (137 lb 9.1 oz)  Body mass index is 20.92 kg/m².    Intake/Output Summary (Last 24 hours) at 4/28/2022 1008  Last data filed at 4/28/2022 0937  Gross per 24 hour   Intake 1825 ml   Output 850 ml   Net 975 ml      Physical Exam  Constitutional:       General: He is not in acute distress.     Appearance: Normal appearance. He is not ill-appearing or diaphoretic.      Comments: Frail elderly, trach and PEG   HENT:      Head: Normocephalic and atraumatic.       Nose: Nose normal.      Mouth/Throat:      Mouth: Mucous membranes are moist.      Pharynx: Oropharynx is clear.   Eyes:      General: No scleral icterus.     Extraocular Movements: Extraocular movements intact.      Conjunctiva/sclera: Conjunctivae normal.      Pupils: Pupils are equal, round, and reactive to light.   Cardiovascular:      Rate and Rhythm: Normal rate and regular rhythm.      Pulses: Normal pulses.      Heart sounds: Normal heart sounds.   Pulmonary:      Effort: Pulmonary effort is normal. No respiratory distress.      Breath sounds: Normal breath sounds. No wheezing, rhonchi or rales.   Abdominal:      General: Abdomen is flat. Bowel sounds are normal. There is no distension.      Palpations: Abdomen is soft.      Tenderness: There is no abdominal tenderness. There is no right CVA tenderness or guarding.   Musculoskeletal:         General: No swelling, tenderness or deformity. Normal range of motion.      Cervical back: Normal range of motion and neck supple. No rigidity or tenderness.   Skin:     General: Skin is warm and dry.      Coloration: Skin is not jaundiced.      Findings: No rash.   Neurological:      General: No focal deficit present.      Mental Status: He is alert and oriented to person, place, and time. Mental status is at baseline.      Motor: No weakness.   Psychiatric:         Mood and Affect: Mood normal.         Behavior: Behavior normal.         Thought Content: Thought content normal.         Judgment: Judgment normal.       Significant Labs: All pertinent labs within the past 24 hours have been reviewed.  CBC:   Recent Labs   Lab 04/27/22  0530   WBC 5.51   HGB 10.7*   HCT 32.8*        CMP:   Recent Labs   Lab 04/27/22  0530      K 4.1      CO2 27   *   BUN 22   CREATININE 0.7   CALCIUM 9.3   PROT 6.0   ALBUMIN 2.5*   BILITOT 0.2   ALKPHOS 69   AST 22   ALT 17   ANIONGAP 10   EGFRNONAA >60.0       Significant Imaging: I have reviewed all  pertinent imaging results/findings within the past 24 hours.      Assessment/Plan:      * Acute on chronic respiratory failure    Pt with SCC of the tongue s/p total glossectomy, chemowith cisplatin, and nearing completion of radiation with trach, COPD, asthma, HTN, CAD s/p PCI presenting for acute on chronic hypoxemic respiratory failure. He is on 5L with trach collar at home. He has had increased yellow secretions, work of breathing and SOB for the past several days. BNP elevated to 1600 on admission with pulmonary edema and new cardiomegaly on CXR. Trop wnl. Bicarb is elevated suggesting chronic hypercapnia. Suctioning has cleared thick mucous. He was able to be weaned down to his home O2, however after ~ 20 minutes he desats again, requiring deep suction with resolution of hypoxia. Now stable on home O2. Diuresed -3L        -Pt stable on home O2 for 24 hours, ready to stepdown to floor  - CAP coverage with ceftriaxone/azithro  - Methylprednisolone 40 mg x5 days for possible element of COPD exacerbation  - Duonebs q4  - Hypertonic saline nebs  - Chest PT  - Suction PRN    4/21 Transfer to hospital medicine . 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  initially requiring vent but has now been stable on home trach collar for 24 hours. sats 93% on 8L via trach collar.  Awaiting cultures from sputum, on CAP coverage with ceftriaxone. completed Azithromycin. continue solumedrol 40mg IV daily.   4/22 sats 95% on trach collar 10/ fio2 60% . completed azithromycin.   started on Zosyn for possible aspiration  repeat CX ray-in the inferior hemithorax on the left side consistent with airspace consolidation/volume loss in the left lower lobe is again appreciated, but the lung zones are essentially stable since the prior exam and demonstrate no new areas of airspace consolidation or volume loss.   respiratory culture 4/19 with pseudomonas.     4/28-  oxygen tapered to 8L/ Fio2 35%     Aspiration pneumonia  4/22 sats 95% on trach collar 10/ fio2 60% . completed azithromycin.   started on Zosyn for possible aspiration  repeat CX ray-in the inferior hemithorax on the left side consistent with airspace consolidation/volume loss in the left lower lobe is again appreciated, but the lung zones are essentially stable since the prior exam and demonstrate no new areas of airspace consolidation or volume loss.  respiratory culture 4/19 with pseudomonas.   4/28 - still on zosyn    Squamous cell cancer of tongue    12/15/21 FNA left neck mass : squamous cell carcinoma, p16 negative  1/4/22 total glossectomy, bilateral neck dissection, bilateral cervical facial advancement flaps and anterolateral thigh free flap reconstruction of his glossectomy defect.  Final path :  [id number] (+microscopic SALINAS, single contralateral node), superior soft tissue margin of left neck with tumor.  2/28/22 start chemoradiation  Finished chemo with cisplatin 4/6. Was going to complete final dose of radiation tomorrow.     -Rad/onc consulted for radiation while inpatient  4/22  s/p radiation oncology eval -on adjuvant chemoradiation, completed 31/33 fractions of RT.  renitiation of RT  inpatient when patient able to tolerate.    4/23  completed radiation sessions    Dysphagia  Nutrition consulted. Most recent weight and BMI monitored-          Malnutrition (Moderate to Severe)  Weight Loss (Malnutrition): greater than 10% in 6 months                 Measurements:  Wt Readings from Last 1 Encounters:   04/27/22 62.4 kg (137 lb 9.1 oz)   Body mass index is 20.92 kg/m².    Recommendations: Recommendation/Intervention: 1.  Goals: Meet % EEN, EPN by RD f/u date    Patient has been screened and assessed by RD. RD will follow patient.    4/21 secondary to above. on G tube feedings   4/22 changed to bolus GT feedings.   4/28 - pt noted to sometimes  refuse TFs.       Hypernatremia  4/25 sodium 150. started on D5W . repeat renal panel in PM   4/28 sodium trended down to 145 -> 140, on TFs    Hypophosphatemia  Replaced  4/28- stable      Hypokalemia  replaced   4/28- stable x 3 days      Chronic respiratory failure with hypoxia, on home O2 therapy  secondary to COPD. on 5L oxygen  at home  4/28 - continue weaning efforts, currently on 8 liters    Protein-calorie malnutrition  Nutrition consulted. Most recent weight and BMI monitored-          Malnutrition (Moderate to Severe)  Weight Loss (Malnutrition): greater than 10% in 6 months              Measurements:  Wt Readings from Last 1 Encounters:   04/27/22 62.4 kg (137 lb 9.1 oz)   Body mass index is 20.92 kg/m².    Recommendations: Recommendation/Intervention: 1.  Goals: Meet % EEN, EPN by RD f/u date    Patient has been screened and assessed by RD. RD will follow patient.      Normocytic anemia    Patient's with Normocytic anemia.. Hemoglobin stable. Etiology likely due to chronic disease .  Current CBC reviewed-    Recent Labs   Lab 04/26/22  0711 04/26/22  0946 04/27/22  0530   HGB 10.0* 10.3* 10.7*     Monitor CBC and transfuse if H/H drops below 7/21.       Other hyperlipidemia  Continue home statin         Primary hypertension  Chronic, controlled.  Latest blood pressure and vitals reviewed-   Temp:  [96 °F (35.6 °C)-98.9 °F (37.2 °C)]   Pulse:  []   Resp:  [16-29]   BP: (109-154)/(67-81)   SpO2:  [92 %-99 %] .   Home meds for hypertension were reviewed- amlodipine, losartan and metoprolol  held for now  PRN meds if BP> 180/110 mm HG  4/28 - not requiring scheduled meds      Coronary artery disease involving native coronary artery of native heart without angina pectoris  Continue ASA, plavix, and statin   4/28 -stable    Goals of care, counseling/discussion  Advance Care Planning      Does patient have Capacity? Yes  Contact: Bridgett mitchell, wife  Goals/Wishes  Code Status- FULL  Pain management-  seems comfortable without pain  Prognosis- s/p radiation, progressing cancer, high risk for aspiration, severe malnutrition  Family discussions-  Functional Status - has trach and peg    Post acute care plan: pt would benefit from outpt referral to Palliative care, plan for HH and home.  Time spent on Goals of Care:           Sinus tachycardia        VTE Risk Mitigation (From admission, onward)         Ordered     enoxaparin injection 40 mg  Daily         04/19/22 1123     IP VTE HIGH RISK PATIENT  Once         04/19/22 1123     Place sequential compression device  Until discontinued         04/19/22 1123                Discharge Planning   NISA: 4/28/2022     Code Status: Full Code   Is the patient medically ready for discharge?: No    Reason for patient still in hospital (select all that apply): Patient trending condition  Discharge Plan A: Home, Home Health   Discharge Delays: None known at this time        Pam Bush MD  Department of Hospital Medicine   [facility] - Telemetry Stepdown

## 2022-04-28 NOTE — ASSESSMENT & PLAN NOTE
Nutrition consulted. Most recent weight and BMI monitored-          Malnutrition (Moderate to Severe)  Weight Loss (Malnutrition): greater than 10% in 6 months              Measurements:  Wt Readings from Last 1 Encounters:   04/27/22 62.4 kg (137 lb 9.1 oz)   Body mass index is 20.92 kg/m².    Recommendations: Recommendation/Intervention: 1.  Goals: Meet % EEN, EPN by RD f/u date    Patient has been screened and assessed by RD. RD will follow patient.

## 2022-04-28 NOTE — ASSESSMENT & PLAN NOTE
Patient's with Normocytic anemia.. Hemoglobin stable. Etiology likely due to chronic disease .  Current CBC reviewed-    Recent Labs   Lab 04/26/22  0711 04/26/22  0946 04/27/22  0530   HGB 10.0* 10.3* 10.7*     Monitor CBC and transfuse if H/H drops below 7/21.

## 2022-04-28 NOTE — NURSING
Pt requested PRN breathing treatment, called respiratory twice but received no response. Pt on 8 L O2 via trach collar, O2 sat in the 90s, no s/s of distress noted at this time.

## 2022-04-28 NOTE — ASSESSMENT & PLAN NOTE
Chronic, controlled.  Latest blood pressure and vitals reviewed-   Temp:  [96 °F (35.6 °C)-98.9 °F (37.2 °C)]   Pulse:  []   Resp:  [16-29]   BP: (109-154)/(67-81)   SpO2:  [92 %-99 %] .   Home meds for hypertension were reviewed- amlodipine, losartan and metoprolol  held for now  PRN meds if BP> 180/110 mm HG  4/28 - not requiring scheduled meds

## 2022-04-28 NOTE — PLAN OF CARE
Pt does not want SNF placement and would prefer HH.  [facility] not in network with [facility].  SW sent referral to [facility].  Once orders are received please send to N.         04/28/22 5633   Post-Acute Status   Post-Acute Authorization Placement;Home Health   Post-Acute Placement Status Patient declined/refused   Home Health Status Referrals Sent   Discharge Delays None known at this time   Discharge Plan   Discharge Plan A Home;Home Health   Discharge Plan B Skilled Nursing Facility     Milady Thomson LMSW  PRN-  [facility]  Ext. [phone]

## 2022-04-28 NOTE — ASSESSMENT & PLAN NOTE
Advance Care Planning      Does patient have Capacity? Yes  Contact: Bridgett mitchell, wife  Goals/Wishes  Code Status- FULL  Pain management- seems comfortable without pain  Prognosis- s/p radiation, progressing cancer, high risk for aspiration, severe malnutrition  Family discussions-  Functional Status - has trach and peg    Post acute care plan: pt would benefit from outpt referral to Palliative care, plan for HH and home.  Time spent on Goals of Care:

## 2022-04-28 NOTE — PLAN OF CARE
Problem: Adult Inpatient Plan of Care  Goal: Plan of Care Review  Outcome: Ongoing, Progressing  Goal: Patient-Specific Goal (Individualized)  Outcome: Ongoing, Progressing  Goal: Absence of Hospital-Acquired Illness or Injury  Outcome: Ongoing, Progressing  Goal: Optimal Comfort and Wellbeing  Outcome: Ongoing, Progressing  Goal: Readiness for Transition of Care  Outcome: Ongoing, Progressing     Problem: Impaired Wound Healing  Goal: Optimal Wound Healing  Outcome: Ongoing, Progressing     Problem: Skin Injury Risk Increased  Goal: Skin Health and Integrity  Outcome: Ongoing, Progressing     Problem: Communication Impairment (Artificial Airway)  Goal: Effective Communication  Outcome: Ongoing, Progressing     Problem: Device-Related Complication Risk (Artificial Airway)  Goal: Optimal Device Function  Outcome: Ongoing, Progressing     Problem: Skin and Tissue Injury (Artificial Airway)  Goal: Absence of Device-Related Skin or Tissue Injury  Outcome: Ongoing, Progressing     Problem: Noninvasive Ventilation Acute  Goal: Effective Unassisted Ventilation and Oxygenation  Outcome: Ongoing, Progressing     Problem: Fall Injury Risk  Goal: Absence of Fall and Fall-Related Injury  Outcome: Ongoing, Progressing     Pt a/a/o x4. NADN. Semi-fowlers in bed, self positioned. Respiratory therapist at bedside presently providing nebulizer tx. O2 sat 93% with o2 infusing at 8L (35%) via uncuffed trach. No suctioning required thus far this shift. PRN medication administered as anticholinergic for increased secretions. Tube feedings presently administered as pt requests per home schedule with MD aware. Isosource 1.5 (1 & 1/2) carton administered four times during [id number] per request. Peg is patent et flushes well. No s/s infection present around site. Continent of both B&B w/ urinal et BSCC accessible. SBA x1 for transfer to [facility]. Offered pt to sit in bedside chair, denies. Continues to self position in bed. 22g INT to left AC  patent w/o s/s infiltration present. Pt ref change of site or dressing this shift. Aware of risks. No acute process to report at present

## 2022-04-29 LAB
ALBUMIN SERPL BCP-MCNC: 2.4 G/DL (ref 3.5–5.2)
ALP SERPL-CCNC: 65 U/L (ref 55–135)
ALT SERPL W/O P-5'-P-CCNC: 13 U/L (ref 10–44)
ANION GAP SERPL CALC-SCNC: 8 MMOL/L (ref 8–16)
AST SERPL-CCNC: 15 U/L (ref 10–40)
BASOPHILS # BLD AUTO: 0.02 K/UL (ref 0–0.2)
BASOPHILS NFR BLD: 0.4 % (ref 0–1.9)
BILIRUB SERPL-MCNC: 0.2 MG/DL (ref 0.1–1)
BUN SERPL-MCNC: 19 MG/DL (ref 8–23)
CALCIUM SERPL-MCNC: 8.9 MG/DL (ref 8.7–10.5)
CHLORIDE SERPL-SCNC: 98 MMOL/L (ref 95–110)
CO2 SERPL-SCNC: 32 MMOL/L (ref 23–29)
CREAT SERPL-MCNC: 0.7 MG/DL (ref 0.5–1.4)
DIFFERENTIAL METHOD: ABNORMAL
EOSINOPHIL # BLD AUTO: 0.1 K/UL (ref 0–0.5)
EOSINOPHIL NFR BLD: 1.2 % (ref 0–8)
ERYTHROCYTE [DISTWIDTH] IN BLOOD BY AUTOMATED COUNT: 16.7 % (ref 11.5–14.5)
EST. GFR  (AFRICAN AMERICAN): >60 ML/MIN/1.73 M^2
EST. GFR  (NON AFRICAN AMERICAN): >60 ML/MIN/1.73 M^2
GLUCOSE SERPL-MCNC: 110 MG/DL (ref 70–110)
HCT VFR BLD AUTO: 31.3 % (ref 40–54)
HGB BLD-MCNC: 9.6 G/DL (ref 14–18)
IMM GRANULOCYTES # BLD AUTO: 0.02 K/UL (ref 0–0.04)
IMM GRANULOCYTES NFR BLD AUTO: 0.4 % (ref 0–0.5)
LYMPHOCYTES # BLD AUTO: 0.6 K/UL (ref 1–4.8)
LYMPHOCYTES NFR BLD: 12 % (ref 18–48)
MAGNESIUM SERPL-MCNC: 2.1 MG/DL (ref 1.6–2.6)
MCH RBC QN AUTO: 29.8 PG (ref 27–31)
MCHC RBC AUTO-ENTMCNC: 30.7 G/DL (ref 32–36)
MCV RBC AUTO: 97 FL (ref 82–98)
MONOCYTES # BLD AUTO: 0.5 K/UL (ref 0.3–1)
MONOCYTES NFR BLD: 10.8 % (ref 4–15)
NEUTROPHILS # BLD AUTO: 3.8 K/UL (ref 1.8–7.7)
NEUTROPHILS NFR BLD: 75.2 % (ref 38–73)
NRBC BLD-RTO: 0 /100 WBC
PHOSPHATE SERPL-MCNC: 3.8 MG/DL (ref 2.7–4.5)
PLATELET # BLD AUTO: 210 K/UL (ref 150–450)
PMV BLD AUTO: 9.7 FL (ref 9.2–12.9)
POTASSIUM SERPL-SCNC: 4 MMOL/L (ref 3.5–5.1)
PROT SERPL-MCNC: 5.7 G/DL (ref 6–8.4)
RBC # BLD AUTO: 3.22 M/UL (ref 4.6–6.2)
SODIUM SERPL-SCNC: 138 MMOL/L (ref 136–145)
WBC # BLD AUTO: 5 K/UL (ref 3.9–12.7)

## 2022-04-29 PROCEDURE — 99233 SBSQ HOSP IP/OBS HIGH 50: CPT | Mod: ,,, | Performed by: HOSPITALIST

## 2022-04-29 PROCEDURE — 25000242 PHARM REV CODE 250 ALT 637 W/ HCPCS: Performed by: HOSPITALIST

## 2022-04-29 PROCEDURE — 63600175 PHARM REV CODE 636 W HCPCS: Performed by: INTERNAL MEDICINE

## 2022-04-29 PROCEDURE — 99900026 HC AIRWAY MAINTENANCE (STAT)

## 2022-04-29 PROCEDURE — 83735 ASSAY OF MAGNESIUM: CPT | Performed by: HOSPITALIST

## 2022-04-29 PROCEDURE — 85025 COMPLETE CBC W/AUTO DIFF WBC: CPT | Performed by: HOSPITALIST

## 2022-04-29 PROCEDURE — 99900035 HC TECH TIME PER 15 MIN (STAT)

## 2022-04-29 PROCEDURE — 25000242 PHARM REV CODE 250 ALT 637 W/ HCPCS: Performed by: STUDENT IN AN ORGANIZED HEALTH CARE EDUCATION/TRAINING PROGRAM

## 2022-04-29 PROCEDURE — 27201112

## 2022-04-29 PROCEDURE — 94640 AIRWAY INHALATION TREATMENT: CPT

## 2022-04-29 PROCEDURE — 25000003 PHARM REV CODE 250: Performed by: INTERNAL MEDICINE

## 2022-04-29 PROCEDURE — 27000221 HC OXYGEN, UP TO 24 HOURS

## 2022-04-29 PROCEDURE — 84100 ASSAY OF PHOSPHORUS: CPT | Performed by: HOSPITALIST

## 2022-04-29 PROCEDURE — 94761 N-INVAS EAR/PLS OXIMETRY MLT: CPT

## 2022-04-29 PROCEDURE — 94668 MNPJ CHEST WALL SBSQ: CPT

## 2022-04-29 PROCEDURE — 20600001 HC STEP DOWN PRIVATE ROOM

## 2022-04-29 PROCEDURE — 80053 COMPREHEN METABOLIC PANEL: CPT | Performed by: HOSPITALIST

## 2022-04-29 PROCEDURE — 99233 PR SUBSEQUENT HOSPITAL CARE,LEVL III: ICD-10-PCS | Mod: ,,, | Performed by: HOSPITALIST

## 2022-04-29 PROCEDURE — 97530 THERAPEUTIC ACTIVITIES: CPT | Mod: CQ

## 2022-04-29 PROCEDURE — 97110 THERAPEUTIC EXERCISES: CPT | Mod: CQ

## 2022-04-29 PROCEDURE — 25000242 PHARM REV CODE 250 ALT 637 W/ HCPCS: Performed by: INTERNAL MEDICINE

## 2022-04-29 PROCEDURE — 25000003 PHARM REV CODE 250: Performed by: HOSPITALIST

## 2022-04-29 PROCEDURE — 25000003 PHARM REV CODE 250: Performed by: STUDENT IN AN ORGANIZED HEALTH CARE EDUCATION/TRAINING PROGRAM

## 2022-04-29 RX ORDER — GLYCOPYRROLATE 1 MG/5ML
2 SOLUTION ORAL 3 TIMES DAILY PRN
Status: DISCONTINUED | OUTPATIENT
Start: 2022-04-29 | End: 2022-05-01

## 2022-04-29 RX ADMIN — ASPIRIN 81 MG CHEWABLE TABLET 81 MG: 81 TABLET CHEWABLE at 09:04

## 2022-04-29 RX ADMIN — IPRATROPIUM BROMIDE AND ALBUTEROL SULFATE 3 ML: 2.5; .5 SOLUTION RESPIRATORY (INHALATION) at 05:04

## 2022-04-29 RX ADMIN — GLYCOPYRROLATE 1 MG: 1 LIQUID ORAL at 11:04

## 2022-04-29 RX ADMIN — FOLIC ACID 1 MG: 1 TABLET ORAL at 09:04

## 2022-04-29 RX ADMIN — SODIUM CHLORIDE SOLN NEBU 3% 4 ML: 3 NEBU SOLN at 11:04

## 2022-04-29 RX ADMIN — GLYCOPYRROLATE 1 MG: 1 LIQUID ORAL at 05:04

## 2022-04-29 RX ADMIN — IPRATROPIUM BROMIDE AND ALBUTEROL SULFATE 3 ML: 2.5; .5 SOLUTION RESPIRATORY (INHALATION) at 09:04

## 2022-04-29 RX ADMIN — PIPERACILLIN SODIUM AND TAZOBACTAM SODIUM 4.5 G: 4; .5 INJECTION, POWDER, FOR SOLUTION INTRAVENOUS at 04:04

## 2022-04-29 RX ADMIN — IPRATROPIUM BROMIDE AND ALBUTEROL SULFATE 3 ML: 2.5; .5 SOLUTION RESPIRATORY (INHALATION) at 11:04

## 2022-04-29 RX ADMIN — THIAMINE HCL TAB 100 MG 100 MG: 100 TAB at 09:04

## 2022-04-29 RX ADMIN — GLYCOPYRROLATE 2 MG: 1 LIQUID ORAL at 08:04

## 2022-04-29 RX ADMIN — OXYCODONE HYDROCHLORIDE 5 MG: 5 SOLUTION ORAL at 12:04

## 2022-04-29 RX ADMIN — ATORVASTATIN CALCIUM 20 MG: 20 TABLET, FILM COATED ORAL at 09:04

## 2022-04-29 RX ADMIN — ENOXAPARIN SODIUM 40 MG: 40 INJECTION SUBCUTANEOUS at 05:04

## 2022-04-29 RX ADMIN — CLOPIDOGREL 75 MG: 75 TABLET, FILM COATED ORAL at 09:04

## 2022-04-29 RX ADMIN — IPRATROPIUM BROMIDE AND ALBUTEROL SULFATE 3 ML: 2.5; .5 SOLUTION RESPIRATORY (INHALATION) at 04:04

## 2022-04-29 RX ADMIN — MELATONIN TAB 3 MG 6 MG: 3 TAB at 08:04

## 2022-04-29 NOTE — ASSESSMENT & PLAN NOTE
Patient's with Normocytic anemia.. Hemoglobin stable. Etiology likely due to chronic disease .  Current CBC reviewed-    Recent Labs   Lab 04/27/22  0530 04/28/22  0937 04/29/22  0328   HGB 10.7* 10.8* 9.6*     Monitor CBC and transfuse if H/H drops below 7/21.

## 2022-04-29 NOTE — ASSESSMENT & PLAN NOTE
Nutrition consulted. Most recent weight and BMI monitored-          Malnutrition (Moderate to Severe)  Weight Loss (Malnutrition): greater than 10% in 6 months              Measurements:  Wt Readings from Last 1 Encounters:   04/27/22 62.4 kg (137 lb 9.1 oz)   Body mass index is 20.92 kg/m².    Recommendations: Recommendation/Intervention: 1.  Goals: Meet % EEN, EPN by RD f/u date    Patient has been screened and assessed by RD. RD will follow patient.  See above

## 2022-04-29 NOTE — ASSESSMENT & PLAN NOTE
Chronic, controlled.  Latest blood pressure and vitals reviewed-   Temp:  [97.4 °F (36.3 °C)-98.3 °F (36.8 °C)]   Pulse:  []   Resp:  [17-29]   BP: (112-138)/(74-78)   SpO2:  [92 %-98 %] .   Home meds for hypertension were reviewed- amlodipine, losartan and metoprolol  held for now  PRN meds if BP> 180/110 mm HG  4/28 - not requiring scheduled meds

## 2022-04-29 NOTE — PROGRESS NOTES
[facility] - Telemetry [facility] Medicine  Progress Note    Patient Name: Fareed Richard Jr.  MRN: [id number]  Patient Class: IP- Inpatient   Admission Date: 4/19/2022  Length of Stay: 10 days  Attending Physician: Pam Bush MD  Primary Care Provider: Kodi Tubbs MD        Subjective:     Principal Problem:Acute on chronic respiratory failure        HPI:  Mr. 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 Per wife he has had several days of increasing lethargy, increased work of breathing and secretions. Yesterday she became particularly concerned when he became slightly less responsive and interactive. Wife also notes increased yellow thick secretions from trach site. Upon EMS arrival he was on his home 5 L by trach collar and saturating 93% with significant wheezing.  He received 1 DuoNeb by them.  He was also suctioned.     Currently patient is alert, following commands.  He is responding to yes no questions.  He denies any chest pain but does endorse shortness of breath and cough.  He denies any abdominal pain or pain elsewhere       ED course:   He was given 500ccs IVF as well as one time doses of vanc/cefepime/azithro. Labs significant for BNP of 1600, WBC 5k, K 5.6, Bicarb 35, VBG 7.26/87/25/40. Trop wnl. CXR with new cardiomegaly and pulmonary edema. Suctioned with return of copious secretions. ICU was consulted for acute hypoxemic respiratory failure and he will be admitted for further management.              Overview/Hospital Course:    Admitted to ICU, started on CAP coverage and solumedrol. Quickly weaned off vent after return of copious secretions on suction. Has been maintaining sats well on home O2. Rad/onc consulted for completion of radiology while inpatient. Pending culture sensitivity.    4/21 Transfer to hospital  medicine . 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  initially requiring vent but has now been stable on home trach collar for 24 hours. sats 93% on 8L via trach collar.  Awaiting cultures from sputum, on CAP coverage with ceftriaxone. completed Azithromycin. continue solumedrol 40mg IV daily.  rad/onc consulted this morning-he was supposed to complete radiation outpt but was admitted, attempting to set up for inpatient  4/22 changed to bolus GT feedings. s/p radiation oncology eval -on adjuvant chemoradiation, completed 31/33 fractions of RT.  renitiation of RT  inpatient when patient able to tolerate. sats 95% on trach collar 10/ fio2 60% . completed azithromycin.  started on Zosyn for possible aspiration  repeat CX ray-in the inferior hemithorax on the left side consistent with airspace consolidation/volume loss in the left lower lobe is again appreciated, but the lung zones are essentially stable since the prior exam and demonstrate no new areas of airspace consolidation or volume loss. respiratory culture 4/19 with pansensitive  pseudomonas.     4/23 intermittently refusing bolus GT feedings.stable on 10L/60% Fio2 . completed radiation sessions. off solumedrol  today. PT recs SNF  4/24 K and P replaced   4/25 sodium 150. started on D5W . repeat renal panel in PM . oxygen tapered to 8L/ Fio2 35%   4/26 K of 3.2  replaced   4/27 stable on 8L/ Fio2 35% . mucous plug remvove by suction , wants glycopyrrolate PRN due to dry mouth   Interval History:   4/28 - has trach, G tube, NPO, completed radiation treatments, 92% on 8 liters,+ stool and urinations. Weaning oxygen. Wants to go home w HH. Discussed his care and condition with his wife.    4/29 - still requiring 8 liters per NC. Sats low 90s. I lowered oxygen to 5 liters while in the room and he tolerated it.  Keep weaning.       Interval  History: see above    Review of Systems   Constitutional:  Negative for activity change, appetite change, chills, fatigue and fever.        Trach and PEG   HENT:  Negative for congestion, facial swelling, hearing loss, nosebleeds, sore throat and trouble swallowing.         Denies pain   Eyes:  Negative for pain and redness.   Respiratory:  Negative for apnea, cough, choking, chest tightness, shortness of breath, wheezing and stridor.    Cardiovascular:  Negative for chest pain, palpitations and leg swelling.   Gastrointestinal:  Negative for abdominal distention, abdominal pain, blood in stool, constipation, diarrhea, nausea and vomiting.   Genitourinary:  Negative for difficulty urinating, dysuria, flank pain, hematuria and urgency.   Musculoskeletal:  Negative for arthralgias, back pain, gait problem, neck pain and neck stiffness.   Skin:  Negative for color change, pallor and rash.   Neurological:  Negative for dizziness, tremors, syncope, facial asymmetry, weakness, light-headedness, numbness and headaches.   Psychiatric/Behavioral:  Negative for agitation, behavioral problems, confusion, decreased concentration, hallucinations, self-injury and sleep disturbance. The patient is not nervous/anxious and is not hyperactive.    Objective:     Vital Signs (Most Recent):  Temp: 97.6 °F (36.4 °C) (04/29/22 0514)  Pulse: 93 (04/29/22 0800)  Resp: 18 (04/29/22 0514)  BP: 132/78 (04/29/22 0514)  SpO2: (!) 93 % (04/29/22 0514)   Vital Signs (24h Range):  Temp:  [97.4 °F (36.3 °C)-98.3 °F (36.8 °C)] 97.6 °F (36.4 °C)  Pulse:  [] 93  Resp:  [17-29] 18  SpO2:  [92 %-98 %] 93 %  BP: (112-138)/(74-78) 132/78     Weight: 62.4 kg (137 lb 9.1 oz)  Body mass index is 20.92 kg/m².    Intake/Output Summary (Last 24 hours) at 4/29/2022 1011  Last data filed at 4/29/2022 0500  Gross per 24 hour   Intake 1200 ml   Output 325 ml   Net 875 ml        Physical Exam  Constitutional:       General: He is not in acute distress.      Appearance: Normal appearance. He is not ill-appearing or diaphoretic.      Comments: Frail elderly, trach and PEG   HENT:      Head: Normocephalic and atraumatic.      Comments: Thin in the face     Nose: Nose normal.      Mouth/Throat:      Mouth: Mucous membranes are moist.      Pharynx: Oropharynx is clear.   Eyes:      General: No scleral icterus.     Extraocular Movements: Extraocular movements intact.      Conjunctiva/sclera: Conjunctivae normal.      Pupils: Pupils are equal, round, and reactive to light.   Cardiovascular:      Rate and Rhythm: Normal rate and regular rhythm.      Pulses: Normal pulses.      Heart sounds: Normal heart sounds.   Pulmonary:      Effort: Pulmonary effort is normal. No respiratory distress.      Breath sounds: Normal breath sounds. No wheezing, rhonchi or rales.   Abdominal:      General: Abdomen is flat. Bowel sounds are normal. There is no distension.      Palpations: Abdomen is soft.      Tenderness: There is no abdominal tenderness. There is no right CVA tenderness or guarding.   Musculoskeletal:         General: No swelling, tenderness or deformity. Normal range of motion.      Cervical back: Normal range of motion and neck supple. No rigidity or tenderness.   Skin:     General: Skin is warm and dry.      Coloration: Skin is not jaundiced.      Findings: No rash.   Neurological:      General: No focal deficit present.      Mental Status: He is alert and oriented to person, place, and time. Mental status is at baseline.      Motor: No weakness.   Psychiatric:         Mood and Affect: Mood normal.         Behavior: Behavior normal.         Thought Content: Thought content normal.         Judgment: Judgment normal.       Significant Labs: All pertinent labs within the past 24 hours have been reviewed.  CBC:   Recent Labs   Lab 04/28/22  0937 04/29/22  0328   WBC 4.49 5.00   HGB 10.8* 9.6*   HCT 34.7* 31.3*    210       CMP:   Recent Labs   Lab 04/28/22  0937  04/29/22  0328    138   K 3.6 4.0   CL 98 98   CO2 32* 32*   * 110   BUN 18 19   CREATININE 0.7 0.7   CALCIUM 9.2 8.9   PROT 6.1 5.7*   ALBUMIN 2.5* 2.4*   BILITOT 0.3 0.2   ALKPHOS 72 65   AST 15 15   ALT 17 13   ANIONGAP 9 8   EGFRNONAA >60.0 >60.0         Significant Imaging: I have reviewed all pertinent imaging results/findings within the past 24 hours.      Assessment/Plan:      * Acute on chronic respiratory failure    Pt with SCC of the tongue s/p total glossectomy, chemowith cisplatin, and nearing completion of radiation with trach, COPD, asthma, HTN, CAD s/p PCI presenting for acute on chronic hypoxemic respiratory failure. He is on 5L with trach collar at home. He has had increased yellow secretions, work of breathing and SOB for the past several days. BNP elevated to 1600 on admission with pulmonary edema and new cardiomegaly on CXR. Trop wnl. Bicarb is elevated suggesting chronic hypercapnia. Suctioning has cleared thick mucous. He was able to be weaned down to his home O2, however after ~ 20 minutes he desats again, requiring deep suction with resolution of hypoxia. Now stable on home O2. Diuresed -3L        -Pt stable on home O2 for 24 hours, ready to stepdown to floor  - CAP coverage with ceftriaxone/azithro  - Methylprednisolone 40 mg x5 days for possible element of COPD exacerbation  - Duonebs q4  - Hypertonic saline nebs  - Chest PT  - Suction PRN    4/21 Transfer to hospital medicine . 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  initially requiring vent but has now been stable on home trach collar for 24 hours. sats 93% on 8L via trach collar.  Awaiting cultures from sputum, on CAP coverage with ceftriaxone. completed Azithromycin. continue solumedrol 40mg IV daily.   4/22 sats 95% on trach collar 10/ fio2 60% . completed azithromycin.   started on Zosyn for  possible aspiration  repeat CX ray-in the inferior hemithorax on the left side consistent with airspace consolidation/volume loss in the left lower lobe is again appreciated, but the lung zones are essentially stable since the prior exam and demonstrate no new areas of airspace consolidation or volume loss.  respiratory culture 4/19 with pseudomonas.     4/29-  oxygen tapered to 8L/ Fio2 35% .  Needs to be at 5 liters to go home.     Aspiration pneumonia  4/22 sats 95% on trach collar 10/ fio2 60% . completed azithromycin.   started on Zosyn for possible aspiration  repeat CX ray-in the inferior hemithorax on the left side consistent with airspace consolidation/volume loss in the left lower lobe is again appreciated, but the lung zones are essentially stable since the prior exam and demonstrate no new areas of airspace consolidation or volume loss.  respiratory culture 4/19 with pseudomonas.   4/29 - still on zosyn, day 8, completed    Squamous cell cancer of tongue    12/15/21 FNA left neck mass : squamous cell carcinoma, p16 negative  1/4/22 total glossectomy, bilateral neck dissection, bilateral cervical facial advancement flaps and anterolateral thigh free flap reconstruction of his glossectomy defect.  Final path :  [id number] (+microscopic SALINAS, single contralateral node), superior soft tissue margin of left neck with tumor.  2/28/22 start chemoradiation  Finished chemo with cisplatin 4/6. Was going to complete final dose of radiation tomorrow.     -Rad/onc consulted for radiation while inpatient  4/22  s/p radiation oncology eval -on adjuvant chemoradiation, completed 31/33 fractions of RT.  renitiation of RT  inpatient when patient able to tolerate.    4/23  completed radiation sessions  4/29 - weaning oxygen, takes 5 liters at home and has all supplies.     Dysphagia  Nutrition consulted. Most recent weight and BMI monitored-          Malnutrition (Moderate to Severe)  Weight Loss (Malnutrition): greater than  10% in 6 months                 Measurements:  Wt Readings from Last 1 Encounters:   04/27/22 62.4 kg (137 lb 9.1 oz)   Body mass index is 20.92 kg/m².    Recommendations: Recommendation/Intervention: 1.  Goals: Meet % EEN, EPN by RD f/u date    Patient has been screened and assessed by RD. RD will follow patient.    4/21 secondary to above. on G tube feedings   4/22 changed to bolus GT feedings.   4/29 - pt noted to sometimes refuse TFs. , on bolus TFs, no water added      Hypernatremia  4/25 sodium 150. started on D5W . repeat renal panel in PM   4/29 sodium trended down to 145 -> 140, on TFs -> 138, not receiving extra water    Hypophosphatemia  Replaced  4/29- stable      Hypokalemia  replaced   4/29- stable x 4 days      Chronic respiratory failure with hypoxia, on home O2 therapy  secondary to COPD. on 5L oxygen  at home  4/29 - continue weaning efforts, currently on 8 liters    Protein-calorie malnutrition  Nutrition consulted. Most recent weight and BMI monitored-          Malnutrition (Moderate to Severe)  Weight Loss (Malnutrition): greater than 10% in 6 months              Measurements:  Wt Readings from Last 1 Encounters:   04/27/22 62.4 kg (137 lb 9.1 oz)   Body mass index is 20.92 kg/m².    Recommendations: Recommendation/Intervention: 1.  Goals: Meet % EEN, EPN by RD f/u date    Patient has been screened and assessed by RD. RD will follow patient.  See above      Normocytic anemia    Patient's with Normocytic anemia.. Hemoglobin stable. Etiology likely due to chronic disease .  Current CBC reviewed-    Recent Labs   Lab 04/27/22  0530 04/28/22  0937 04/29/22  0328   HGB 10.7* 10.8* 9.6*     Monitor CBC and transfuse if H/H drops below 7/21.       Other hyperlipidemia  Continue home statin         Primary hypertension  Chronic, controlled.  Latest blood pressure and vitals reviewed-   Temp:  [97.4 °F (36.3 °C)-98.3 °F (36.8 °C)]   Pulse:  []   Resp:  [17-29]   BP: (112-138)/(74-78)    SpO2:  [92 %-98 %] .   Home meds for hypertension were reviewed- amlodipine, losartan and metoprolol  held for now  PRN meds if BP> 180/110 mm HG  4/28 - not requiring scheduled meds      Coronary artery disease involving native coronary artery of native heart without angina pectoris  Continue ASA, plavix, and statin   4/28 -stable    Goals of care, counseling/discussion  Advance Care Planning      Does patient have Capacity? Yes  Contact: Bridgett mitchell, wife  Goals/Wishes: wants to go home, HH w Home PT  Code Status- FULL  Pain management- seems comfortable without pain  Prognosis- s/p radiation, progressing cancer, high risk for aspiration, severe malnutrition  Family discussions-  4/28, 4/29 - discussed care and condition w pt and his wife.  He can talk w trach collar but sats drop.  He wants to go home. Has all oxygen supplies at home. Pt completes zosyn today.   Functional Status - has trach and peg. recommended outpt f/u palliative care    Post acute care plan: pt would benefit from outpt referral to Palliative care, plan for HH and home.      Sinus tachycardia        VTE Risk Mitigation (From admission, onward)         Ordered     enoxaparin injection 40 mg  Daily         04/19/22 1123     IP VTE HIGH RISK PATIENT  Once         04/19/22 1123     Place sequential compression device  Until discontinued         04/19/22 1123                Discharge Planning   NISA: 4/30/2022     Code Status: Full Code   Is the patient medically ready for discharge?: No    Reason for patient still in hospital (select all that apply): Patient trending condition  Discharge Plan A: Home, Home Health   Discharge Delays: None known at this time              Pam Bush MD  Department of Hospital Medicine   [facility] - Telemetry Stepdown

## 2022-04-29 NOTE — SUBJECTIVE & OBJECTIVE
Interval History: see above    Review of Systems   Constitutional:  Negative for activity change, appetite change, chills, fatigue and fever.        Trach and PEG   HENT:  Negative for congestion, facial swelling, hearing loss, nosebleeds, sore throat and trouble swallowing.         Denies pain   Eyes:  Negative for pain and redness.   Respiratory:  Negative for apnea, cough, choking, chest tightness, shortness of breath, wheezing and stridor.    Cardiovascular:  Negative for chest pain, palpitations and leg swelling.   Gastrointestinal:  Negative for abdominal distention, abdominal pain, blood in stool, constipation, diarrhea, nausea and vomiting.   Genitourinary:  Negative for difficulty urinating, dysuria, flank pain, hematuria and urgency.   Musculoskeletal:  Negative for arthralgias, back pain, gait problem, neck pain and neck stiffness.   Skin:  Negative for color change, pallor and rash.   Neurological:  Negative for dizziness, tremors, syncope, facial asymmetry, weakness, light-headedness, numbness and headaches.   Psychiatric/Behavioral:  Negative for agitation, behavioral problems, confusion, decreased concentration, hallucinations, self-injury and sleep disturbance. The patient is not nervous/anxious and is not hyperactive.    Objective:     Vital Signs (Most Recent):  Temp: 97.6 °F (36.4 °C) (04/29/22 0514)  Pulse: 93 (04/29/22 0800)  Resp: 18 (04/29/22 0514)  BP: 132/78 (04/29/22 0514)  SpO2: (!) 93 % (04/29/22 0514)   Vital Signs (24h Range):  Temp:  [97.4 °F (36.3 °C)-98.3 °F (36.8 °C)] 97.6 °F (36.4 °C)  Pulse:  [] 93  Resp:  [17-29] 18  SpO2:  [92 %-98 %] 93 %  BP: (112-138)/(74-78) 132/78     Weight: 62.4 kg (137 lb 9.1 oz)  Body mass index is 20.92 kg/m².    Intake/Output Summary (Last 24 hours) at 4/29/2022 1011  Last data filed at 4/29/2022 0500  Gross per 24 hour   Intake 1200 ml   Output 325 ml   Net 875 ml        Physical Exam  Constitutional:       General: He is not in acute  distress.     Appearance: Normal appearance. He is not ill-appearing or diaphoretic.      Comments: Frail elderly, trach and PEG   HENT:      Head: Normocephalic and atraumatic.      Comments: Thin in the face     Nose: Nose normal.      Mouth/Throat:      Mouth: Mucous membranes are moist.      Pharynx: Oropharynx is clear.   Eyes:      General: No scleral icterus.     Extraocular Movements: Extraocular movements intact.      Conjunctiva/sclera: Conjunctivae normal.      Pupils: Pupils are equal, round, and reactive to light.   Cardiovascular:      Rate and Rhythm: Normal rate and regular rhythm.      Pulses: Normal pulses.      Heart sounds: Normal heart sounds.   Pulmonary:      Effort: Pulmonary effort is normal. No respiratory distress.      Breath sounds: Normal breath sounds. No wheezing, rhonchi or rales.   Abdominal:      General: Abdomen is flat. Bowel sounds are normal. There is no distension.      Palpations: Abdomen is soft.      Tenderness: There is no abdominal tenderness. There is no right CVA tenderness or guarding.   Musculoskeletal:         General: No swelling, tenderness or deformity. Normal range of motion.      Cervical back: Normal range of motion and neck supple. No rigidity or tenderness.   Skin:     General: Skin is warm and dry.      Coloration: Skin is not jaundiced.      Findings: No rash.   Neurological:      General: No focal deficit present.      Mental Status: He is alert and oriented to person, place, and time. Mental status is at baseline.      Motor: No weakness.   Psychiatric:         Mood and Affect: Mood normal.         Behavior: Behavior normal.         Thought Content: Thought content normal.         Judgment: Judgment normal.       Significant Labs: All pertinent labs within the past 24 hours have been reviewed.  CBC:   Recent Labs   Lab 04/28/22  0937 04/29/22  0328   WBC 4.49 5.00   HGB 10.8* 9.6*   HCT 34.7* 31.3*    210       CMP:   Recent Labs   Lab  04/28/22  0937 04/29/22  0328    138   K 3.6 4.0   CL 98 98   CO2 32* 32*   * 110   BUN 18 19   CREATININE 0.7 0.7   CALCIUM 9.2 8.9   PROT 6.1 5.7*   ALBUMIN 2.5* 2.4*   BILITOT 0.3 0.2   ALKPHOS 72 65   AST 15 15   ALT 17 13   ANIONGAP 9 8   EGFRNONAA >60.0 >60.0         Significant Imaging: I have reviewed all pertinent imaging results/findings within the past 24 hours.

## 2022-04-29 NOTE — ASSESSMENT & PLAN NOTE
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 He is on 5L with trach collar at home. He has had increased yellow secretions, work of breathing and SOB for the past several days. BNP elevated to 1600 on admission with pulmonary edema and new cardiomegaly on CXR. Trop wnl. Bicarb is elevated suggesting chronic hypercapnia. Suctioning has cleared thick mucous. He was able to be weaned down to his home O2, however after ~ 20 minutes he desats again, requiring deep suction with resolution of hypoxia. Now stable on home O2. Diuresed -3L        -Pt stable on home O2 for 24 hours, ready to stepdown to floor  - CAP coverage with ceftriaxone/azithro  - Methylprednisolone 40 mg x5 days for possible element of COPD exacerbation  - Duonebs q4  - Hypertonic saline nebs  - Chest PT  - Suction PRN    4/21 Transfer to hospital medicine . 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  initially requiring vent but has now been stable on home trach collar for 24 hours. sats 93% on 8L via trach collar.  Awaiting cultures from sputum, on CAP coverage with ceftriaxone. completed Azithromycin. continue solumedrol 40mg IV daily.   4/22 sats 95% on trach collar 10/ fio2 60% . completed azithromycin.   started on Zosyn for possible aspiration  repeat CX ray-in the inferior hemithorax on the left side consistent with airspace consolidation/volume loss in the left lower lobe is again appreciated, but the lung zones are essentially stable since the prior exam and demonstrate no new areas of airspace consolidation or volume loss.  respiratory culture 4/19 with pseudomonas.     4/29-  oxygen tapered to 8L/ Fio2 35% .  Needs to be at 5 liters to go home.

## 2022-04-29 NOTE — ASSESSMENT & PLAN NOTE
4/25 sodium 150. started on D5W . repeat renal panel in PM   4/29 sodium trended down to 145 -> 140, on TFs -> 138, not receiving extra water

## 2022-04-29 NOTE — ASSESSMENT & PLAN NOTE
Nutrition consulted. Most recent weight and BMI monitored-          Malnutrition (Moderate to Severe)  Weight Loss (Malnutrition): greater than 10% in 6 months                 Measurements:  Wt Readings from Last 1 Encounters:   04/27/22 62.4 kg (137 lb 9.1 oz)   Body mass index is 20.92 kg/m².    Recommendations: Recommendation/Intervention: 1.  Goals: Meet % EEN, EPN by RD f/u date    Patient has been screened and assessed by RD. RD will follow patient.    4/21 secondary to above. on G tube feedings   4/22 changed to bolus GT feedings.   4/29 - pt noted to sometimes refuse TFs. , on bolus TFs, no water added

## 2022-04-29 NOTE — PT/OT/SLP PROGRESS
"Physical Therapy Treatment    Patient Name:  Fareed Richard Jr.   MRN:  [id number]    Recommendations:     Discharge Recommendations:  nursing facility, skilled   Discharge Equipment Recommendations: none   Barriers to discharge: increased level of assistance    Assessment:     Fareed Richard Jr. is a 72 y.o. male admitted with a medical diagnosis of Acute on chronic respiratory failure.  He presents with the following impairments/functional limitations:  weakness, impaired endurance, impaired self care skills, impaired functional mobilty, gait instability, impaired balance, impaired cardiopulmonary response to activity, decreased safety awareness, decreased coordination, impaired coordination.  Treatment session limited by unwilling to ambulate/standing activities. Pt agreeable to seated therex and demonstrated good sitting EOB balance for 15 minutes. SpO2 maintained above 90%, no c/o SOB. Pt continues to benefit from therapy to improve functional mobility, strength, and endurance.      Rehab Prognosis: Fair; patient would benefit from acute skilled PT services to address these deficits and reach maximum level of function.    Recent Surgery: * No surgery found *      Plan:     During this hospitalization, patient to be seen 3 x/week to address the identified rehab impairments via gait training, therapeutic activities, therapeutic exercises, neuromuscular re-education and progress toward the following goals:    · Plan of Care Expires:  05/22/22    Subjective     Chief Complaint: having therapy late in the afternoon   Patient/Family Comments/goals: pt wrote "why are you coming now when I've been laying in bed all day" (pt writing to communicate d/t trach)  Pain/Comfort:  · Pain Rating 1: 0/10  · Pain Rating Post-Intervention 1: 0/10      Objective:     Communicated with RN prior to session.  Patient found sitting EOB with RN present with pulse ox (continuous), telemetry, oxygen, Tracheostomy upon PT entry to room. " Spouse present during session.     General Precautions: Standard, fall   Orthopedic Precautions:N/A   Braces: N/A  Respiratory Status: Trach 5L (spO2 above 90%, no c/o SOB)     Functional Mobility:  · Bed Mobility:     · Scooting along EOB: contact guard assistance  · Verbal cues for sequencing and hand placement   · Sit to Supine: stand by assistance  · Transfer: pt refused despite max encouragement    Balance:   · Static/Dynamic sitting EOB balance: good, SBA for safety x15 minutes  · With BUE support      AM-PAC 6 CLICK MOBILITY  Turning over in bed (including adjusting bedclothes, sheets and blankets)?: 3  Sitting down on and standing up from a chair with arms (e.g., wheelchair, bedside commode, etc.): 3  Moving from lying on back to sitting on the side of the bed?: 3  Moving to and from a bed to a chair (including a wheelchair)?: 3  Need to walk in hospital room?: 2  Climbing 3-5 steps with a railing?: 2  Basic Mobility Total Score: 16       Therapeutic Activities and Exercises:   Pt unwilling to ambulate, but agreeable to seated therex.    Seated BLE therex x15 reps: LAQ, marching, hip abd/add, ankle pumps   Patient educated on role of therapy, goals of session, and benefits of out of bed mobility.   Instructed on use of call button and importance of calling nursing staff for assistance with mobility   Questions/concerns addressed within PTA scope of practice    Patient left HOB elevated with all lines intact, call button in reach and spouse present..    GOALS:   Multidisciplinary Problems     Physical Therapy Goals        Problem: Physical Therapy    Goal Priority Disciplines Outcome Goal Variances Interventions   Physical Therapy Goal     PT, PT/OT Ongoing, Progressing     Description: Goals to be met by: 22    Patient will increase functional independence with mobility by performin. Pt will perform supine<>sit with supervision to improve independence with mobility  2. Pt will perform functional  transfers with supervision   3. Pt will ambulate >150 ft feet with LRAD and supervision to safely perform household distance ambulation  4. Pt will ascend/descend 5 stairs with supervision to safely manage within home.                    Time Tracking:     PT Received On: 04/29/22  PT Start Time: 1446     PT Stop Time: 1513  PT Total Time (min): 27 min     Billable Minutes: Therapeutic Activity 12 and Therapeutic Exercise 15    Treatment Type: Treatment  PT/PTA: PTA     PTA Visit Number: 1     04/29/2022

## 2022-04-29 NOTE — PLAN OF CARE
Alert and oriented x4. Vitals stable. Trach on place on 8L O2. Refused feeding boluses for the night. Free from falls and injuries during shift. No concerns or requests voiced. Bed low with side rails up x2. Call bell within reach. Will continue plan of care.

## 2022-04-29 NOTE — ASSESSMENT & PLAN NOTE
12/15/21 FNA left neck mass : squamous cell carcinoma, p16 negative  1/4/22 total glossectomy, bilateral neck dissection, bilateral cervical facial advancement flaps and anterolateral thigh free flap reconstruction of his glossectomy defect.  Final path :  [id number] (+microscopic SALINAS, single contralateral node), superior soft tissue margin of left neck with tumor.  2/28/22 start chemoradiation  Finished chemo with cisplatin 4/6. Was going to complete final dose of radiation tomorrow.     -Rad/onc consulted for radiation while inpatient  4/22  s/p radiation oncology eval -on adjuvant chemoradiation, completed 31/33 fractions of RT.  renitiation of RT  inpatient when patient able to tolerate.    4/23  completed radiation sessions  4/29 - weaning oxygen, takes 5 liters at home and has all supplies.

## 2022-04-29 NOTE — ASSESSMENT & PLAN NOTE
Advance Care Planning      Does patient have Capacity? Yes  Contact: Bridgett mitchell, wife  Goals/Wishes: wants to go home, HH w Home PT  Code Status- FULL  Pain management- seems comfortable without pain  Prognosis- s/p radiation, progressing cancer, high risk for aspiration, severe malnutrition  Family discussions-  4/28, 4/29 - discussed care and condition w pt and his wife.  He can talk w trach collar but sats drop.  He wants to go home. Has all oxygen supplies at home. Pt completes zosyn today.   Functional Status - has trach and peg. recommended outpt f/u palliative care    Post acute care plan: pt would benefit from outpt referral to Palliative care, plan for HH and home.

## 2022-04-29 NOTE — ASSESSMENT & PLAN NOTE
4/22 sats 95% on trach collar 10/ fio2 60% . completed azithromycin.   started on Zosyn for possible aspiration  repeat CX ray-in the inferior hemithorax on the left side consistent with airspace consolidation/volume loss in the left lower lobe is again appreciated, but the lung zones are essentially stable since the prior exam and demonstrate no new areas of airspace consolidation or volume loss.  respiratory culture 4/19 with pseudomonas.   4/29 - still on zosyn, day 8, completed

## 2022-04-30 LAB
ALBUMIN SERPL BCP-MCNC: 2.6 G/DL (ref 3.5–5.2)
ALP SERPL-CCNC: 76 U/L (ref 55–135)
ALT SERPL W/O P-5'-P-CCNC: 13 U/L (ref 10–44)
ANION GAP SERPL CALC-SCNC: 11 MMOL/L (ref 8–16)
AST SERPL-CCNC: 15 U/L (ref 10–40)
BASOPHILS # BLD AUTO: 0.03 K/UL (ref 0–0.2)
BASOPHILS NFR BLD: 0.4 % (ref 0–1.9)
BILIRUB SERPL-MCNC: 0.2 MG/DL (ref 0.1–1)
BUN SERPL-MCNC: 15 MG/DL (ref 8–23)
CALCIUM SERPL-MCNC: 9.4 MG/DL (ref 8.7–10.5)
CHLORIDE SERPL-SCNC: 98 MMOL/L (ref 95–110)
CO2 SERPL-SCNC: 28 MMOL/L (ref 23–29)
CREAT SERPL-MCNC: 0.6 MG/DL (ref 0.5–1.4)
DIFFERENTIAL METHOD: ABNORMAL
EOSINOPHIL # BLD AUTO: 0 K/UL (ref 0–0.5)
EOSINOPHIL NFR BLD: 0.3 % (ref 0–8)
ERYTHROCYTE [DISTWIDTH] IN BLOOD BY AUTOMATED COUNT: 16.9 % (ref 11.5–14.5)
EST. GFR  (AFRICAN AMERICAN): >60 ML/MIN/1.73 M^2
EST. GFR  (NON AFRICAN AMERICAN): >60 ML/MIN/1.73 M^2
GLUCOSE SERPL-MCNC: 155 MG/DL (ref 70–110)
HCT VFR BLD AUTO: 35.7 % (ref 40–54)
HGB BLD-MCNC: 11.1 G/DL (ref 14–18)
IMM GRANULOCYTES # BLD AUTO: 0.04 K/UL (ref 0–0.04)
IMM GRANULOCYTES NFR BLD AUTO: 0.5 % (ref 0–0.5)
LYMPHOCYTES # BLD AUTO: 0.6 K/UL (ref 1–4.8)
LYMPHOCYTES NFR BLD: 7.8 % (ref 18–48)
MAGNESIUM SERPL-MCNC: 1.9 MG/DL (ref 1.6–2.6)
MCH RBC QN AUTO: 28.9 PG (ref 27–31)
MCHC RBC AUTO-ENTMCNC: 31.1 G/DL (ref 32–36)
MCV RBC AUTO: 93 FL (ref 82–98)
MONOCYTES # BLD AUTO: 0.6 K/UL (ref 0.3–1)
MONOCYTES NFR BLD: 8 % (ref 4–15)
NEUTROPHILS # BLD AUTO: 6.3 K/UL (ref 1.8–7.7)
NEUTROPHILS NFR BLD: 83 % (ref 38–73)
NRBC BLD-RTO: 0 /100 WBC
PHOSPHATE SERPL-MCNC: 3 MG/DL (ref 2.7–4.5)
PLATELET # BLD AUTO: 197 K/UL (ref 150–450)
PMV BLD AUTO: 9.7 FL (ref 9.2–12.9)
POTASSIUM SERPL-SCNC: 4 MMOL/L (ref 3.5–5.1)
PROT SERPL-MCNC: 6.4 G/DL (ref 6–8.4)
RBC # BLD AUTO: 3.84 M/UL (ref 4.6–6.2)
SODIUM SERPL-SCNC: 137 MMOL/L (ref 136–145)
WBC # BLD AUTO: 7.6 K/UL (ref 3.9–12.7)

## 2022-04-30 PROCEDURE — 25000003 PHARM REV CODE 250: Performed by: STUDENT IN AN ORGANIZED HEALTH CARE EDUCATION/TRAINING PROGRAM

## 2022-04-30 PROCEDURE — 94640 AIRWAY INHALATION TREATMENT: CPT

## 2022-04-30 PROCEDURE — 25000003 PHARM REV CODE 250: Performed by: HOSPITALIST

## 2022-04-30 PROCEDURE — 20600001 HC STEP DOWN PRIVATE ROOM

## 2022-04-30 PROCEDURE — 80053 COMPREHEN METABOLIC PANEL: CPT | Performed by: HOSPITALIST

## 2022-04-30 PROCEDURE — 85025 COMPLETE CBC W/AUTO DIFF WBC: CPT | Performed by: HOSPITALIST

## 2022-04-30 PROCEDURE — 99233 SBSQ HOSP IP/OBS HIGH 50: CPT | Mod: ,,, | Performed by: HOSPITALIST

## 2022-04-30 PROCEDURE — 25000242 PHARM REV CODE 250 ALT 637 W/ HCPCS: Performed by: HOSPITALIST

## 2022-04-30 PROCEDURE — 99233 PR SUBSEQUENT HOSPITAL CARE,LEVL III: ICD-10-PCS | Mod: ,,, | Performed by: HOSPITALIST

## 2022-04-30 PROCEDURE — 99900026 HC AIRWAY MAINTENANCE (STAT)

## 2022-04-30 PROCEDURE — 84100 ASSAY OF PHOSPHORUS: CPT | Performed by: HOSPITALIST

## 2022-04-30 PROCEDURE — 99900035 HC TECH TIME PER 15 MIN (STAT)

## 2022-04-30 PROCEDURE — 36415 COLL VENOUS BLD VENIPUNCTURE: CPT | Performed by: HOSPITALIST

## 2022-04-30 PROCEDURE — 94761 N-INVAS EAR/PLS OXIMETRY MLT: CPT

## 2022-04-30 PROCEDURE — 83735 ASSAY OF MAGNESIUM: CPT | Performed by: HOSPITALIST

## 2022-04-30 PROCEDURE — 63600175 PHARM REV CODE 636 W HCPCS: Performed by: INTERNAL MEDICINE

## 2022-04-30 PROCEDURE — 25000242 PHARM REV CODE 250 ALT 637 W/ HCPCS: Performed by: INTERNAL MEDICINE

## 2022-04-30 PROCEDURE — 27000221 HC OXYGEN, UP TO 24 HOURS

## 2022-04-30 RX ORDER — GUAIFENESIN 100 MG/5ML
200 SOLUTION ORAL EVERY 8 HOURS
Status: DISCONTINUED | OUTPATIENT
Start: 2022-04-30 | End: 2022-05-12 | Stop reason: HOSPADM

## 2022-04-30 RX ADMIN — IPRATROPIUM BROMIDE AND ALBUTEROL SULFATE 3 ML: 2.5; .5 SOLUTION RESPIRATORY (INHALATION) at 04:04

## 2022-04-30 RX ADMIN — GUAIFENESIN 200 MG: 100 SOLUTION ORAL at 09:04

## 2022-04-30 RX ADMIN — ATORVASTATIN CALCIUM 20 MG: 20 TABLET, FILM COATED ORAL at 08:04

## 2022-04-30 RX ADMIN — GLYCOPYRROLATE 2 MG: 1 LIQUID ORAL at 06:04

## 2022-04-30 RX ADMIN — THIAMINE HCL TAB 100 MG 100 MG: 100 TAB at 08:04

## 2022-04-30 RX ADMIN — ASPIRIN 81 MG CHEWABLE TABLET 81 MG: 81 TABLET CHEWABLE at 08:04

## 2022-04-30 RX ADMIN — IPRATROPIUM BROMIDE AND ALBUTEROL SULFATE 3 ML: 2.5; .5 SOLUTION RESPIRATORY (INHALATION) at 12:04

## 2022-04-30 RX ADMIN — SODIUM CHLORIDE SOLN NEBU 3% 4 ML: 3 NEBU SOLN at 07:04

## 2022-04-30 RX ADMIN — GLYCOPYRROLATE 2 MG: 1 LIQUID ORAL at 08:04

## 2022-04-30 RX ADMIN — CLOPIDOGREL 75 MG: 75 TABLET, FILM COATED ORAL at 08:04

## 2022-04-30 RX ADMIN — SODIUM CHLORIDE SOLN NEBU 3% 4 ML: 3 NEBU SOLN at 12:04

## 2022-04-30 RX ADMIN — IPRATROPIUM BROMIDE AND ALBUTEROL SULFATE 3 ML: 2.5; .5 SOLUTION RESPIRATORY (INHALATION) at 07:04

## 2022-04-30 RX ADMIN — IPRATROPIUM BROMIDE AND ALBUTEROL SULFATE 3 ML: 2.5; .5 SOLUTION RESPIRATORY (INHALATION) at 03:04

## 2022-04-30 RX ADMIN — ENOXAPARIN SODIUM 40 MG: 40 INJECTION SUBCUTANEOUS at 04:04

## 2022-04-30 RX ADMIN — IPRATROPIUM BROMIDE AND ALBUTEROL SULFATE 3 ML: 2.5; .5 SOLUTION RESPIRATORY (INHALATION) at 11:04

## 2022-04-30 RX ADMIN — GUAIFENESIN 200 MG: 100 SOLUTION ORAL at 02:04

## 2022-04-30 RX ADMIN — FOLIC ACID 1 MG: 1 TABLET ORAL at 08:04

## 2022-04-30 RX ADMIN — MELATONIN TAB 3 MG 6 MG: 3 TAB at 09:04

## 2022-04-30 RX ADMIN — SODIUM CHLORIDE SOLN NEBU 3% 4 ML: 3 NEBU SOLN at 03:04

## 2022-04-30 NOTE — ASSESSMENT & PLAN NOTE
4/22 sats 95% on trach collar 10/ fio2 60% . completed azithromycin.   started on Zosyn for possible aspiration  repeat CX ray-in the inferior hemithorax on the left side consistent with airspace consolidation/volume loss in the left lower lobe is again appreciated, but the lung zones are essentially stable since the prior exam and demonstrate no new areas of airspace consolidation or volume loss.  respiratory culture 4/19 with pseudomonas.   -  completed zosyn

## 2022-04-30 NOTE — ASSESSMENT & PLAN NOTE
Chronic, controlled.  Latest blood pressure and vitals reviewed-   Temp:  [97.5 °F (36.4 °C)-98.6 °F (37 °C)]   Pulse:  []   Resp:  [14-28]   BP: (111-167)/(67-86)   SpO2:  [90 %-95 %] .   Home meds for hypertension were reviewed- amlodipine, losartan and metoprolol  held for now  PRN meds if BP> 180/110 mm HG  4/28 - not requiring scheduled meds

## 2022-04-30 NOTE — ASSESSMENT & PLAN NOTE
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 He is on 5L with trach collar at home. He has had increased yellow secretions, work of breathing and SOB for the past several days. BNP elevated to 1600 on admission with pulmonary edema and new cardiomegaly on CXR. Trop wnl. Bicarb is elevated suggesting chronic hypercapnia. Suctioning has cleared thick mucous. He was able to be weaned down to his home O2, however after ~ 20 minutes he desats again, requiring deep suction with resolution of hypoxia. Now stable on home O2. Diuresed -3L        -Pt stable on home O2 for 24 hours, ready to stepdown to floor  - CAP coverage with ceftriaxone/azithro  - Methylprednisolone 40 mg x5 days for possible element of COPD exacerbation  - Duonebs q4  - Hypertonic saline nebs  - Chest PT  - Suction PRN    4/21 Transfer to hospital medicine . 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  initially requiring vent but has now been stable on home trach collar for 24 hours. sats 93% on 8L via trach collar.  Awaiting cultures from sputum, on CAP coverage with ceftriaxone. completed Azithromycin. continue solumedrol 40mg IV daily.   4/22 sats 95% on trach collar 10/ fio2 60% . completed azithromycin.   started on Zosyn for possible aspiration  repeat CX ray-in the inferior hemithorax on the left side consistent with airspace consolidation/volume loss in the left lower lobe is again appreciated, but the lung zones are essentially stable since the prior exam and demonstrate no new areas of airspace consolidation or volume loss.  respiratory culture 4/19 with pseudomonas.     4/30-  oxygen tapered to 8L/ Fio2 35% .  Needs to be at 5 liters to go home. Add  leola for cough and secertion. Up in chair as much as possible

## 2022-04-30 NOTE — ASSESSMENT & PLAN NOTE
12/15/21 FNA left neck mass : squamous cell carcinoma, p16 negative  1/4/22 total glossectomy, bilateral neck dissection, bilateral cervical facial advancement flaps and anterolateral thigh free flap reconstruction of his glossectomy defect.  Final path :  [id number] (+microscopic SALINAS, single contralateral node), superior soft tissue margin of left neck with tumor.  2/28/22 start chemoradiation  Finished chemo with cisplatin 4/6. Was going to complete final dose of radiation tomorrow.     -Rad/onc consulted for radiation while inpatient  4/22  s/p radiation oncology eval -on adjuvant chemoradiation, completed 31/33 fractions of RT.  renitiation of RT  inpatient when patient able to tolerate.    4/23  completed radiation sessions  4/29 - weaning oxygen, takes 5 liters at home and has all supplies.

## 2022-04-30 NOTE — SUBJECTIVE & OBJECTIVE
Interval History: see above    Review of Systems   Constitutional:  Negative for activity change, appetite change, chills, fatigue and fever.        Trach and PEG   HENT:  Negative for congestion, facial swelling, hearing loss, nosebleeds, sore throat and trouble swallowing.         Denies pain   Eyes:  Negative for pain and redness.   Respiratory:  Negative for apnea, cough, choking, chest tightness, shortness of breath, wheezing and stridor.    Cardiovascular:  Negative for chest pain, palpitations and leg swelling.   Gastrointestinal:  Negative for abdominal distention, abdominal pain, blood in stool, constipation, diarrhea, nausea and vomiting.   Genitourinary:  Negative for difficulty urinating, dysuria, flank pain, hematuria and urgency.   Musculoskeletal:  Negative for arthralgias, back pain, gait problem, neck pain and neck stiffness.   Skin:  Negative for color change, pallor and rash.   Neurological:  Negative for dizziness, tremors, syncope, facial asymmetry, weakness, light-headedness, numbness and headaches.   Psychiatric/Behavioral:  Negative for agitation, behavioral problems, confusion, decreased concentration, hallucinations, self-injury and sleep disturbance. The patient is not nervous/anxious and is not hyperactive.    Objective:     Vital Signs (Most Recent):  Temp: 98.2 °F (36.8 °C) (04/30/22 0745)  Pulse: 93 (04/30/22 0757)  Resp: 14 (04/30/22 0757)  BP: (!) 146/81 (04/30/22 0745)  SpO2: (!) 93 % (04/30/22 0757)   Vital Signs (24h Range):  Temp:  [97.5 °F (36.4 °C)-98.6 °F (37 °C)] 98.2 °F (36.8 °C)  Pulse:  [] 93  Resp:  [14-28] 14  SpO2:  [90 %-95 %] 93 %  BP: (111-167)/(67-86) 146/81     Weight: 62.4 kg (137 lb 9.1 oz)  Body mass index is 20.92 kg/m².    Intake/Output Summary (Last 24 hours) at 4/30/2022 0816  Last data filed at 4/30/2022 0200  Gross per 24 hour   Intake 1100 ml   Output 550 ml   Net 550 ml        Physical Exam  Constitutional:       General: He is not in acute  distress.     Appearance: Normal appearance. He is not ill-appearing or diaphoretic.      Comments: Frail elderly, trach and PEG   HENT:      Head: Normocephalic and atraumatic.      Comments: Thin in the face     Nose: Nose normal.      Mouth/Throat:      Mouth: Mucous membranes are moist.      Pharynx: Oropharynx is clear.   Eyes:      General: No scleral icterus.     Extraocular Movements: Extraocular movements intact.      Conjunctiva/sclera: Conjunctivae normal.      Pupils: Pupils are equal, round, and reactive to light.   Cardiovascular:      Rate and Rhythm: Normal rate and regular rhythm.      Pulses: Normal pulses.      Heart sounds: Normal heart sounds.   Pulmonary:      Effort: Pulmonary effort is normal. No respiratory distress.      Breath sounds: Normal breath sounds. No wheezing, rhonchi or rales.   Abdominal:      General: Abdomen is flat. Bowel sounds are normal. There is no distension.      Palpations: Abdomen is soft.      Tenderness: There is no abdominal tenderness. There is no right CVA tenderness or guarding.   Musculoskeletal:         General: No swelling, tenderness or deformity. Normal range of motion.      Cervical back: Normal range of motion and neck supple. No rigidity or tenderness.   Skin:     General: Skin is warm and dry.      Coloration: Skin is not jaundiced.      Findings: No rash.   Neurological:      General: No focal deficit present.      Mental Status: He is alert and oriented to person, place, and time. Mental status is at baseline.      Motor: No weakness.   Psychiatric:         Mood and Affect: Mood normal.         Behavior: Behavior normal.         Thought Content: Thought content normal.         Judgment: Judgment normal.       Significant Labs: All pertinent labs within the past 24 hours have been reviewed.  CBC:   Recent Labs   Lab 04/28/22  0937 04/29/22  0328   WBC 4.49 5.00   HGB 10.8* 9.6*   HCT 34.7* 31.3*    210       CMP:   Recent Labs   Lab  04/28/22  0937 04/29/22  0328    138   K 3.6 4.0   CL 98 98   CO2 32* 32*   * 110   BUN 18 19   CREATININE 0.7 0.7   CALCIUM 9.2 8.9   PROT 6.1 5.7*   ALBUMIN 2.5* 2.4*   BILITOT 0.3 0.2   ALKPHOS 72 65   AST 15 15   ALT 17 13   ANIONGAP 9 8   EGFRNONAA >60.0 >60.0         Significant Imaging: I have reviewed all pertinent imaging results/findings within the past 24 hours.

## 2022-04-30 NOTE — PROGRESS NOTES
[facility] - Telemetry [facility] Medicine  Progress Note    Patient Name: Fareed Richard Jr.  MRN: [id number]  Patient Class: IP- Inpatient   Admission Date: 4/19/2022  Length of Stay: 11 days  Attending Physician: Pam Bush MD  Primary Care Provider: Kodi Tubbs MD        Subjective:     Principal Problem:Acute on chronic respiratory failure        HPI:  Mr. 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 Per wife he has had several days of increasing lethargy, increased work of breathing and secretions. Yesterday she became particularly concerned when he became slightly less responsive and interactive. Wife also notes increased yellow thick secretions from trach site. Upon EMS arrival he was on his home 5 L by trach collar and saturating 93% with significant wheezing.  He received 1 DuoNeb by them.  He was also suctioned.     Currently patient is alert, following commands.  He is responding to yes no questions.  He denies any chest pain but does endorse shortness of breath and cough.  He denies any abdominal pain or pain elsewhere       ED course:   He was given 500ccs IVF as well as one time doses of vanc/cefepime/azithro. Labs significant for BNP of 1600, WBC 5k, K 5.6, Bicarb 35, VBG 7.26/87/25/40. Trop wnl. CXR with new cardiomegaly and pulmonary edema. Suctioned with return of copious secretions. ICU was consulted for acute hypoxemic respiratory failure and he will be admitted for further management.              Overview/Hospital Course:    Admitted to ICU, started on CAP coverage and solumedrol. Quickly weaned off vent after return of copious secretions on suction. Has been maintaining sats well on home O2. Rad/onc consulted for completion of radiology while inpatient. Pending culture sensitivity.    4/21 Transfer to hospital  medicine . 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  initially requiring vent but has now been stable on home trach collar for 24 hours. sats 93% on 8L via trach collar.  Awaiting cultures from sputum, on CAP coverage with ceftriaxone. completed Azithromycin. continue solumedrol 40mg IV daily.  rad/onc consulted this morning-he was supposed to complete radiation outpt but was admitted, attempting to set up for inpatient  4/22 changed to bolus GT feedings. s/p radiation oncology eval -on adjuvant chemoradiation, completed 31/33 fractions of RT.  renitiation of RT  inpatient when patient able to tolerate. sats 95% on trach collar 10/ fio2 60% . completed azithromycin.  started on Zosyn for possible aspiration  repeat CX ray-in the inferior hemithorax on the left side consistent with airspace consolidation/volume loss in the left lower lobe is again appreciated, but the lung zones are essentially stable since the prior exam and demonstrate no new areas of airspace consolidation or volume loss. respiratory culture 4/19 with pansensitive  pseudomonas.     4/23 intermittently refusing bolus GT feedings.stable on 10L/60% Fio2 . completed radiation sessions. off solumedrol  today. PT recs SNF  4/24 K and P replaced   4/25 sodium 150. started on D5W . repeat renal panel in PM . oxygen tapered to 8L/ Fio2 35%   4/26 K of 3.2  replaced   4/27 stable on 8L/ Fio2 35% . mucous plug remvove by suction , wants glycopyrrolate PRN due to dry mouth   Interval History:   4/28 - has trach, G tube, NPO, completed radiation treatments, 92% on 8 liters,+ stool and urinations. Weaning oxygen. Wants to go home w HH. Discussed his care and condition with his wife.    4/29 - still requiring 8 liters per NC. Sats low 90s. I lowered oxygen to 5 liters while in the room and he tolerated it.  Keep weaning.   4/30 - back up  to 8 liters per NC, refused  some Bolus TFs yesterday      Interval History: see above    Review of Systems   Constitutional:  Negative for activity change, appetite change, chills, fatigue and fever.        Trach and PEG   HENT:  Negative for congestion, facial swelling, hearing loss, nosebleeds, sore throat and trouble swallowing.         Denies pain   Eyes:  Negative for pain and redness.   Respiratory:  Negative for apnea, cough, choking, chest tightness, shortness of breath, wheezing and stridor.    Cardiovascular:  Negative for chest pain, palpitations and leg swelling.   Gastrointestinal:  Negative for abdominal distention, abdominal pain, blood in stool, constipation, diarrhea, nausea and vomiting.   Genitourinary:  Negative for difficulty urinating, dysuria, flank pain, hematuria and urgency.   Musculoskeletal:  Negative for arthralgias, back pain, gait problem, neck pain and neck stiffness.   Skin:  Negative for color change, pallor and rash.   Neurological:  Negative for dizziness, tremors, syncope, facial asymmetry, weakness, light-headedness, numbness and headaches.   Psychiatric/Behavioral:  Negative for agitation, behavioral problems, confusion, decreased concentration, hallucinations, self-injury and sleep disturbance. The patient is not nervous/anxious and is not hyperactive.    Objective:     Vital Signs (Most Recent):  Temp: 98.2 °F (36.8 °C) (04/30/22 0745)  Pulse: 93 (04/30/22 0757)  Resp: 14 (04/30/22 0757)  BP: (!) 146/81 (04/30/22 0745)  SpO2: (!) 93 % (04/30/22 0757)   Vital Signs (24h Range):  Temp:  [97.5 °F (36.4 °C)-98.6 °F (37 °C)] 98.2 °F (36.8 °C)  Pulse:  [] 93  Resp:  [14-28] 14  SpO2:  [90 %-95 %] 93 %  BP: (111-167)/(67-86) 146/81     Weight: 62.4 kg (137 lb 9.1 oz)  Body mass index is 20.92 kg/m².    Intake/Output Summary (Last 24 hours) at 4/30/2022 0816  Last data filed at 4/30/2022 0200  Gross per 24 hour   Intake 1100 ml   Output 550 ml   Net 550 ml        Physical  Exam  Constitutional:       General: He is not in acute distress.     Appearance: Normal appearance. He is not ill-appearing or diaphoretic.      Comments: Frail elderly, trach and PEG   HENT:      Head: Normocephalic and atraumatic.      Comments: Thin in the face     Nose: Nose normal.      Mouth/Throat:      Mouth: Mucous membranes are moist.      Pharynx: Oropharynx is clear.   Eyes:      General: No scleral icterus.     Extraocular Movements: Extraocular movements intact.      Conjunctiva/sclera: Conjunctivae normal.      Pupils: Pupils are equal, round, and reactive to light.   Cardiovascular:      Rate and Rhythm: Normal rate and regular rhythm.      Pulses: Normal pulses.      Heart sounds: Normal heart sounds.   Pulmonary:      Effort: Pulmonary effort is normal. No respiratory distress.      Breath sounds: Normal breath sounds. No wheezing, rhonchi or rales.   Abdominal:      General: Abdomen is flat. Bowel sounds are normal. There is no distension.      Palpations: Abdomen is soft.      Tenderness: There is no abdominal tenderness. There is no right CVA tenderness or guarding.   Musculoskeletal:         General: No swelling, tenderness or deformity. Normal range of motion.      Cervical back: Normal range of motion and neck supple. No rigidity or tenderness.   Skin:     General: Skin is warm and dry.      Coloration: Skin is not jaundiced.      Findings: No rash.   Neurological:      General: No focal deficit present.      Mental Status: He is alert and oriented to person, place, and time. Mental status is at baseline.      Motor: No weakness.   Psychiatric:         Mood and Affect: Mood normal.         Behavior: Behavior normal.         Thought Content: Thought content normal.         Judgment: Judgment normal.       Significant Labs: All pertinent labs within the past 24 hours have been reviewed.  CBC:   Recent Labs   Lab 04/28/22  0937 04/29/22  0328   WBC 4.49 5.00   HGB 10.8* 9.6*   HCT 34.7* 31.3*     210       CMP:   Recent Labs   Lab 04/28/22  0937 04/29/22  0328    138   K 3.6 4.0   CL 98 98   CO2 32* 32*   * 110   BUN 18 19   CREATININE 0.7 0.7   CALCIUM 9.2 8.9   PROT 6.1 5.7*   ALBUMIN 2.5* 2.4*   BILITOT 0.3 0.2   ALKPHOS 72 65   AST 15 15   ALT 17 13   ANIONGAP 9 8   EGFRNONAA >60.0 >60.0         Significant Imaging: I have reviewed all pertinent imaging results/findings within the past 24 hours.      Assessment/Plan:      * Acute on chronic respiratory failure    Pt with SCC of the tongue s/p total glossectomy, chemowith cisplatin, and nearing completion of radiation with trach, COPD, asthma, HTN, CAD s/p PCI presenting for acute on chronic hypoxemic respiratory failure. He is on 5L with trach collar at home. He has had increased yellow secretions, work of breathing and SOB for the past several days. BNP elevated to 1600 on admission with pulmonary edema and new cardiomegaly on CXR. Trop wnl. Bicarb is elevated suggesting chronic hypercapnia. Suctioning has cleared thick mucous. He was able to be weaned down to his home O2, however after ~ 20 minutes he desats again, requiring deep suction with resolution of hypoxia. Now stable on home O2. Diuresed -3L        -Pt stable on home O2 for 24 hours, ready to stepdown to floor  - CAP coverage with ceftriaxone/azithro  - Methylprednisolone 40 mg x5 days for possible element of COPD exacerbation  - Duonebs q4  - Hypertonic saline nebs  - Chest PT  - Suction PRN    4/21 Transfer to hospital medicine . 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  initially requiring vent but has now been stable on home trach collar for 24 hours. sats 93% on 8L via trach collar.  Awaiting cultures from sputum, on CAP coverage with ceftriaxone. completed Azithromycin. continue solumedrol 40mg IV daily.   4/22 sats 95% on trach collar 10/  fio2 60% . completed azithromycin.   started on Zosyn for possible aspiration  repeat CX ray-in the inferior hemithorax on the left side consistent with airspace consolidation/volume loss in the left lower lobe is again appreciated, but the lung zones are essentially stable since the prior exam and demonstrate no new areas of airspace consolidation or volume loss.  respiratory culture 4/19 with pseudomonas.     4/30-  oxygen tapered to 8L/ Fio2 35% .  Needs to be at 5 liters to go home. Add guaifenisen for cough and secertion. Up in chair as much as possible    Aspiration pneumonia  4/22 sats 95% on trach collar 10/ fio2 60% . completed azithromycin.   started on Zosyn for possible aspiration  repeat CX ray-in the inferior hemithorax on the left side consistent with airspace consolidation/volume loss in the left lower lobe is again appreciated, but the lung zones are essentially stable since the prior exam and demonstrate no new areas of airspace consolidation or volume loss.  respiratory culture 4/19 with pseudomonas.   -  completed zosyn    Squamous cell cancer of tongue    12/15/21 FNA left neck mass : squamous cell carcinoma, p16 negative  1/4/22 total glossectomy, bilateral neck dissection, bilateral cervical facial advancement flaps and anterolateral thigh free flap reconstruction of his glossectomy defect.  Final path :  [id number] (+microscopic SALINAS, single contralateral node), superior soft tissue margin of left neck with tumor.  2/28/22 start chemoradiation  Finished chemo with cisplatin 4/6. Was going to complete final dose of radiation tomorrow.     -Rad/onc consulted for radiation while inpatient  4/22  s/p radiation oncology eval -on adjuvant chemoradiation, completed 31/33 fractions of RT.  renitiation of RT  inpatient when patient able to tolerate.    4/23  completed radiation sessions  4/29 - weaning oxygen, takes 5 liters at home and has all supplies.     Dysphagia  Nutrition consulted. Most recent  weight and BMI monitored-          Malnutrition (Moderate to Severe)  Weight Loss (Malnutrition): greater than 10% in 6 months                 Measurements:  Wt Readings from Last 1 Encounters:   04/27/22 62.4 kg (137 lb 9.1 oz)   Body mass index is 20.92 kg/m².    Recommendations: Recommendation/Intervention: 1.  Goals: Meet % EEN, EPN by RD f/u date    Patient has been screened and assessed by RD. RD will follow patient.    4/21 secondary to above. on G tube feedings   4/22 changed to bolus GT feedings.   4/29 - pt noted to sometimes refuse TFs. , on bolus TFs, no water added      Hypernatremia  4/25 sodium 150. started on D5W . repeat renal panel in PM   4/29 sodium trended down to 145 -> 140, on TFs -> 138, not receiving extra water    Hypophosphatemia  Replaced  4/29- stable      Hypokalemia  replaced   4/29- stable x 4 days      Chronic respiratory failure with hypoxia, on home O2 therapy  secondary to COPD. on 5L oxygen  at home  4/30 - continue weaning efforts, currently on 8 liters    Protein-calorie malnutrition  Nutrition consulted. Most recent weight and BMI monitored-          Malnutrition (Moderate to Severe)  Weight Loss (Malnutrition): greater than 10% in 6 months              Measurements:  Wt Readings from Last 1 Encounters:   04/27/22 62.4 kg (137 lb 9.1 oz)   Body mass index is 20.92 kg/m².    Recommendations: Recommendation/Intervention: 1.  Goals: Meet % EEN, EPN by RD f/u date    Patient has been screened and assessed by RD. RD will follow patient.  See above      Normocytic anemia    Patient's with Normocytic anemia.. Hemoglobin stable. Etiology likely due to chronic disease .  Current CBC reviewed-    Recent Labs   Lab 04/27/22  0530 04/28/22  0937 04/29/22  0328   HGB 10.7* 10.8* 9.6*     Monitor CBC and transfuse if H/H drops below 7/21.       Other hyperlipidemia  Continue home statin         Primary hypertension  Chronic, controlled.  Latest blood pressure and vitals  reviewed-   Temp:  [97.5 °F (36.4 °C)-98.6 °F (37 °C)]   Pulse:  []   Resp:  [14-28]   BP: (111-167)/(67-86)   SpO2:  [90 %-95 %] .   Home meds for hypertension were reviewed- amlodipine, losartan and metoprolol  held for now  PRN meds if BP> 180/110 mm HG  4/28 - not requiring scheduled meds      Coronary artery disease involving native coronary artery of native heart without angina pectoris  Continue ASA, plavix, and statin   4/28 -stable    Goals of care, counseling/discussion  Advance Care Planning      Does patient have Capacity? Yes  Contact: Bridgett mitchell, wife  Goals/Wishes: wants to go home, HH w Home PT  Code Status- FULL  Pain management- seems comfortable without pain  Prognosis- s/p radiation, progressing cancer, high risk for aspiration, severe malnutrition  Family discussions-  4/28, 4/29 - discussed care and condition w pt and his wife.  He can talk w trach collar but sats drop.  He wants to go home. Has all oxygen supplies at home. Pt completes zosyn today.   Functional Status - has trach and peg. recommended outpt f/u palliative care    Post acute care plan: pt would benefit from outpt referral to Palliative care, plan for HH and home.      Sinus tachycardia        VTE Risk Mitigation (From admission, onward)         Ordered     enoxaparin injection 40 mg  Daily         04/19/22 1123     IP VTE HIGH RISK PATIENT  Once         04/19/22 1123     Place sequential compression device  Until discontinued         04/19/22 1123                Discharge Planning   NISA: 5/1/2022     Code Status: Full Code   Is the patient medically ready for discharge?: No    Reason for patient still in hospital (select all that apply): Patient trending condition  Discharge Plan A: Home, Home Health   Discharge Delays: None known at this time      Pam Bush MD  Department of Hospital Medicine   [facility] - Telemetry Stepdown

## 2022-04-30 NOTE — PLAN OF CARE
Alert and oriented x4. Vitals stable. Refused bolus feedings. Free from falls and injuries during shift. No concerns or requests voiced. Bed low with side rails up x2. Call bell within reach. Will continue plan of care.

## 2022-05-01 LAB
ALBUMIN SERPL BCP-MCNC: 2.3 G/DL (ref 3.5–5.2)
ALP SERPL-CCNC: 69 U/L (ref 55–135)
ALT SERPL W/O P-5'-P-CCNC: 13 U/L (ref 10–44)
ANION GAP SERPL CALC-SCNC: 9 MMOL/L (ref 8–16)
AST SERPL-CCNC: 13 U/L (ref 10–40)
BASOPHILS # BLD AUTO: 0.02 K/UL (ref 0–0.2)
BASOPHILS NFR BLD: 0.3 % (ref 0–1.9)
BILIRUB SERPL-MCNC: 0.2 MG/DL (ref 0.1–1)
BUN SERPL-MCNC: 18 MG/DL (ref 8–23)
CALCIUM SERPL-MCNC: 8.9 MG/DL (ref 8.7–10.5)
CHLORIDE SERPL-SCNC: 100 MMOL/L (ref 95–110)
CO2 SERPL-SCNC: 29 MMOL/L (ref 23–29)
CREAT SERPL-MCNC: 0.6 MG/DL (ref 0.5–1.4)
DIFFERENTIAL METHOD: ABNORMAL
EOSINOPHIL # BLD AUTO: 0 K/UL (ref 0–0.5)
EOSINOPHIL NFR BLD: 0.5 % (ref 0–8)
ERYTHROCYTE [DISTWIDTH] IN BLOOD BY AUTOMATED COUNT: 17 % (ref 11.5–14.5)
EST. GFR  (AFRICAN AMERICAN): >60 ML/MIN/1.73 M^2
EST. GFR  (NON AFRICAN AMERICAN): >60 ML/MIN/1.73 M^2
GLUCOSE SERPL-MCNC: 111 MG/DL (ref 70–110)
HCT VFR BLD AUTO: 29.9 % (ref 40–54)
HGB BLD-MCNC: 9.4 G/DL (ref 14–18)
IMM GRANULOCYTES # BLD AUTO: 0.03 K/UL (ref 0–0.04)
IMM GRANULOCYTES NFR BLD AUTO: 0.5 % (ref 0–0.5)
LYMPHOCYTES # BLD AUTO: 0.7 K/UL (ref 1–4.8)
LYMPHOCYTES NFR BLD: 10.6 % (ref 18–48)
MAGNESIUM SERPL-MCNC: 2 MG/DL (ref 1.6–2.6)
MCH RBC QN AUTO: 29.3 PG (ref 27–31)
MCHC RBC AUTO-ENTMCNC: 31.4 G/DL (ref 32–36)
MCV RBC AUTO: 93 FL (ref 82–98)
MONOCYTES # BLD AUTO: 0.6 K/UL (ref 0.3–1)
MONOCYTES NFR BLD: 10.3 % (ref 4–15)
NEUTROPHILS # BLD AUTO: 4.8 K/UL (ref 1.8–7.7)
NEUTROPHILS NFR BLD: 77.8 % (ref 38–73)
NRBC BLD-RTO: 0 /100 WBC
PHOSPHATE SERPL-MCNC: 3.5 MG/DL (ref 2.7–4.5)
PLATELET # BLD AUTO: 178 K/UL (ref 150–450)
PMV BLD AUTO: 9.4 FL (ref 9.2–12.9)
POTASSIUM SERPL-SCNC: 3.8 MMOL/L (ref 3.5–5.1)
PROT SERPL-MCNC: 5.7 G/DL (ref 6–8.4)
RBC # BLD AUTO: 3.21 M/UL (ref 4.6–6.2)
SODIUM SERPL-SCNC: 138 MMOL/L (ref 136–145)
WBC # BLD AUTO: 6.12 K/UL (ref 3.9–12.7)

## 2022-05-01 PROCEDURE — 94761 N-INVAS EAR/PLS OXIMETRY MLT: CPT

## 2022-05-01 PROCEDURE — 80053 COMPREHEN METABOLIC PANEL: CPT | Performed by: HOSPITALIST

## 2022-05-01 PROCEDURE — 63600175 PHARM REV CODE 636 W HCPCS: Performed by: INTERNAL MEDICINE

## 2022-05-01 PROCEDURE — 83735 ASSAY OF MAGNESIUM: CPT | Performed by: HOSPITALIST

## 2022-05-01 PROCEDURE — 27000221 HC OXYGEN, UP TO 24 HOURS

## 2022-05-01 PROCEDURE — 99231 PR SUBSEQUENT HOSPITAL CARE,LEVL I: ICD-10-PCS | Mod: ,,, | Performed by: HOSPITALIST

## 2022-05-01 PROCEDURE — 20600001 HC STEP DOWN PRIVATE ROOM

## 2022-05-01 PROCEDURE — 25000242 PHARM REV CODE 250 ALT 637 W/ HCPCS: Performed by: INTERNAL MEDICINE

## 2022-05-01 PROCEDURE — 25000003 PHARM REV CODE 250: Performed by: HOSPITALIST

## 2022-05-01 PROCEDURE — 25000242 PHARM REV CODE 250 ALT 637 W/ HCPCS: Performed by: HOSPITALIST

## 2022-05-01 PROCEDURE — 99900026 HC AIRWAY MAINTENANCE (STAT)

## 2022-05-01 PROCEDURE — 94640 AIRWAY INHALATION TREATMENT: CPT

## 2022-05-01 PROCEDURE — 84100 ASSAY OF PHOSPHORUS: CPT | Performed by: HOSPITALIST

## 2022-05-01 PROCEDURE — 25000003 PHARM REV CODE 250: Performed by: STUDENT IN AN ORGANIZED HEALTH CARE EDUCATION/TRAINING PROGRAM

## 2022-05-01 PROCEDURE — 99231 SBSQ HOSP IP/OBS SF/LOW 25: CPT | Mod: ,,, | Performed by: HOSPITALIST

## 2022-05-01 PROCEDURE — 85025 COMPLETE CBC W/AUTO DIFF WBC: CPT | Performed by: HOSPITALIST

## 2022-05-01 PROCEDURE — 99900035 HC TECH TIME PER 15 MIN (STAT)

## 2022-05-01 PROCEDURE — 25000242 PHARM REV CODE 250 ALT 637 W/ HCPCS: Performed by: STUDENT IN AN ORGANIZED HEALTH CARE EDUCATION/TRAINING PROGRAM

## 2022-05-01 RX ORDER — SCOLOPAMINE TRANSDERMAL SYSTEM 1 MG/1
1 PATCH, EXTENDED RELEASE TRANSDERMAL
Status: DISCONTINUED | OUTPATIENT
Start: 2022-05-01 | End: 2022-05-12 | Stop reason: HOSPADM

## 2022-05-01 RX ORDER — GLYCOPYRROLATE 1 MG/5ML
2 SOLUTION ORAL 4 TIMES DAILY PRN
Status: DISCONTINUED | OUTPATIENT
Start: 2022-05-01 | End: 2022-05-12 | Stop reason: HOSPADM

## 2022-05-01 RX ADMIN — MELATONIN TAB 3 MG 6 MG: 3 TAB at 09:05

## 2022-05-01 RX ADMIN — GUAIFENESIN 200 MG: 100 SOLUTION ORAL at 05:05

## 2022-05-01 RX ADMIN — GLYCOPYRROLATE 2 MG: 1 LIQUID ORAL at 09:05

## 2022-05-01 RX ADMIN — CLOPIDOGREL 75 MG: 75 TABLET, FILM COATED ORAL at 09:05

## 2022-05-01 RX ADMIN — FOLIC ACID 1 MG: 1 TABLET ORAL at 09:05

## 2022-05-01 RX ADMIN — IPRATROPIUM BROMIDE AND ALBUTEROL SULFATE 3 ML: 2.5; .5 SOLUTION RESPIRATORY (INHALATION) at 11:05

## 2022-05-01 RX ADMIN — IPRATROPIUM BROMIDE AND ALBUTEROL SULFATE 3 ML: 2.5; .5 SOLUTION RESPIRATORY (INHALATION) at 03:05

## 2022-05-01 RX ADMIN — THIAMINE HCL TAB 100 MG 100 MG: 100 TAB at 09:05

## 2022-05-01 RX ADMIN — SCOPALAMINE 1 PATCH: 1 PATCH, EXTENDED RELEASE TRANSDERMAL at 04:05

## 2022-05-01 RX ADMIN — POLYETHYLENE GLYCOL 3350 17 G: 17 POWDER, FOR SOLUTION ORAL at 09:05

## 2022-05-01 RX ADMIN — SODIUM CHLORIDE SOLN NEBU 3% 4 ML: 3 NEBU SOLN at 11:05

## 2022-05-01 RX ADMIN — IPRATROPIUM BROMIDE AND ALBUTEROL SULFATE 3 ML: 2.5; .5 SOLUTION RESPIRATORY (INHALATION) at 12:05

## 2022-05-01 RX ADMIN — GLYCOPYRROLATE 2 MG: 1 LIQUID ORAL at 12:05

## 2022-05-01 RX ADMIN — ASPIRIN 81 MG CHEWABLE TABLET 81 MG: 81 TABLET CHEWABLE at 09:05

## 2022-05-01 RX ADMIN — SODIUM CHLORIDE SOLN NEBU 3% 4 ML: 3 NEBU SOLN at 12:05

## 2022-05-01 RX ADMIN — ATORVASTATIN CALCIUM 20 MG: 20 TABLET, FILM COATED ORAL at 09:05

## 2022-05-01 RX ADMIN — IPRATROPIUM BROMIDE AND ALBUTEROL SULFATE 3 ML: 2.5; .5 SOLUTION RESPIRATORY (INHALATION) at 06:05

## 2022-05-01 RX ADMIN — ENOXAPARIN SODIUM 40 MG: 40 INJECTION SUBCUTANEOUS at 04:05

## 2022-05-01 RX ADMIN — IPRATROPIUM BROMIDE AND ALBUTEROL SULFATE 3 ML: 2.5; .5 SOLUTION RESPIRATORY (INHALATION) at 07:05

## 2022-05-01 RX ADMIN — IPRATROPIUM BROMIDE AND ALBUTEROL SULFATE 3 ML: 2.5; .5 SOLUTION RESPIRATORY (INHALATION) at 08:05

## 2022-05-01 RX ADMIN — GLYCOPYRROLATE 2 MG: 1 LIQUID ORAL at 03:05

## 2022-05-01 NOTE — ASSESSMENT & PLAN NOTE
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 He is on 5L with trach collar at home. He has had increased yellow secretions, work of breathing and SOB for the past several days. BNP elevated to 1600 on admission with pulmonary edema and new cardiomegaly on CXR. Trop wnl. Bicarb is elevated suggesting chronic hypercapnia. Suctioning has cleared thick mucous. He was able to be weaned down to his home O2, however after ~ 20 minutes he desats again, requiring deep suction with resolution of hypoxia. Now stable on home O2. Diuresed -3L     -Pt stable on home O2 for 24 hours (5 liters per NC), ready to stepdown to floor  - CAP coverage with ceftriaxone/azithro  - Methylprednisolone 40 mg x5 days for possible element of COPD exacerbation  - Duonebs q4  - Hypertonic saline nebs  - Chest PT  - Suction PRN    4/21 Transfer to hospital medicine . 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  initially requiring vent but has now been stable on home trach collar for 24 hours. sats 93% on 8L via trach collar.  Awaiting cultures from sputum, on CAP coverage with ceftriaxone. completed Azithromycin. continue solumedrol 40mg IV daily.   4/22 sats 95% on trach collar 10/ fio2 60% . completed azithromycin.   started on Zosyn for possible aspiration  repeat CX ray-in the inferior hemithorax on the left side consistent with airspace consolidation/volume loss in the left lower lobe is again appreciated, but the lung zones are essentially stable since the prior exam and demonstrate no new areas of airspace consolidation or volume loss.  respiratory culture 4/19 with pseudomonas.     4/30-  oxygen tapered to 8L/ Fio2 35% .  Needs to be at 5 liters to go  home. Add guaifenisen for cough and secertion. Up in chair as much as possible  5/1 - not able to wean oxygen.  He may need a LTAC for HF oxygen.

## 2022-05-01 NOTE — RESPIRATORY THERAPY
RAPID RESPONSE RESPIRATORY THERAPY PROACTIVE NOTE           Time of visit:      Code Status: Full Code   : 1950  Bed: 8094/8094 A:   MRN: [id number]  Time spent at the bedside: < 15 min    SITUATION    Evaluated patient for: LDA Check     BACKGROUND    Patient has a past medical history of Cancer, COPD (chronic obstructive pulmonary disease), Hyperlipidemia, Hypertension, and Pseudoaneurysm.  Clinically Significant Surgical Hx: tracheostomy    24 Hours Vitals Range:  Temp:  [97 °F (36.1 °C)-98.5 °F (36.9 °C)]   Pulse:  []   Resp:  [19-27]   BP: (109-147)/(70-93)   SpO2:  [91 %-98 %]     Labs:    Recent Labs     22  0328 22  0931 22  0542    137 138   K 4.0 4.0 3.8   CL 98 98 100   CO2 32* 28 29   CREATININE 0.7 0.6 0.6    155* 111*   PHOS 3.8 3.0 3.5   MG 2.1 1.9 2.0        No results for input(s): PH, PCO2, PO2, HCO3, POCSATURATED, BE in the last 72 hours.    ASSESSMENT/INTERVENTIONS    Upon arrival in room Pt resting in bed. All supplies at bedside. Extra Shiley trachs size 4.0 cuffed and 6.0 cuffed at bedside.    Last VS   Temp: 97.2 °F (36.2 °C) ( 1511)  Pulse: 98 ( 1544)  Resp: 19 ( 1544)  BP: 109/70 ( 1147)  SpO2: 91 % ( 154)    Level of Consciousness: Level of Consciousness (AVPU): alert  Respiratory Effort: Respiratory Effort: Unlabored, Normal Expansion/Accessory Muscle Usage: Expansion/Accessory Muscles/Retractions: no use of accessory muscles  All Lung Field Breath Sounds: All Lung Fields Breath Sounds: Anterior:, Lateral:, diminished  DEE Breath Sounds: diminished  LLL Breath Sounds: diminished  RUL Breath Sounds: diminished  RML Breath Sounds: diminished  RLL Breath Sounds: diminished  O2 Device/Concentration: Flow (L/min): 8, Oxygen Concentration (%): 35, O2 Device (Oxygen Therapy): Trach Collar  Surgical airway: Yes, Type: Shiley Size: 6, uncuffed  Ambu at bedside: Ambu bag with the patient?: Yes, Adult Ambu     Active Orders    Respiratory Care    Chest physiotherapy TID     Frequency: TID     Number of Occurrences: Until Specified     Order Questions:      Indications: COPIOUS SPUTUM PRODUCTION      Indications: ATELECTASIS PROPHYLAXIS      Indications: ATELECTASIS NONRESPONSIVE TO TX    Inhalation Treatment Q4H     Frequency: Q4H     Number of Occurrences: Until Specified    Oxygen Continuous     Frequency: Continuous     Number of Occurrences: Until Specified     Order Questions:      Device type: Low flow      Device: Trach Collar (Comment: 8L)      FiO2%: 35%      Titrate O2 per Oxygen Titration Protocol: Yes      To maintain SpO2 goal of: >= 90%      Notify MD of: Inability to achieve desired SpO2; Sudden change in patient status and requires 20% increase in FiO2; Patient requires >60% FiO2    Pulse Oximetry Continuous     Frequency: Continuous     Number of Occurrences: Until Specified    Routine tracheostomy care     Frequency: BID     Number of Occurrences: Until Specified       RECOMMENDATIONS    We recommend: RRT Recs: Continue POC per primary team.    FOLLOW-UP    Please call back the Rapid Response RT, Andreea Gardner, RRT at x [phone] for any questions or concerns.

## 2022-05-01 NOTE — ASSESSMENT & PLAN NOTE
Chronic, controlled.  Latest blood pressure and vitals reviewed-   Temp:  [97.6 °F (36.4 °C)-98.5 °F (36.9 °C)]   Pulse:  []   Resp:  [14-27]   BP: (108-147)/(70-93)   SpO2:  [88 %-98 %] .   Home meds for hypertension were reviewed- amlodipine, losartan and metoprolol  held for now  PRN meds if BP> 180/110 mm HG  4/28 - not requiring scheduled meds

## 2022-05-01 NOTE — ASSESSMENT & PLAN NOTE
4/25 sodium 150. started on D5W . repeat renal panel in PM   5/1- sodium trended down to 145 -> 140, on TFs -> 138, not receiving extra water

## 2022-05-01 NOTE — ASSESSMENT & PLAN NOTE
Advance Care Planning      Does patient have Capacity? Yes  Contact: Bridgett mitchell, wife  Goals/Wishes: wants to go home, HH w Home PT  Code Status- FULL  Pain management- seems comfortable without pain  Prognosis- s/p radiation, progressing cancer, high risk for aspiration, severe malnutrition  Family discussions-  4/28, 4/29 - discussed care and condition w pt and his wife.  He can talk w trach collar but sats drop.  He wants to go home. Has all oxygen supplies at home. Pt completed zosyn.    Functional Status - has trach and peg. recommended outpt f/u palliative care    Post acute care plan: pt would benefit from outpt referral to Palliative care, plan for HH and home.    5/1 - not able to wean oxygen.  He may need a LTAC for HF oxygen.

## 2022-05-01 NOTE — ASSESSMENT & PLAN NOTE
12/15/21 FNA left neck mass : squamous cell carcinoma, p16 negative  1/4/22 total glossectomy, bilateral neck dissection, bilateral cervical facial advancement flaps and anterolateral thigh free flap reconstruction of his glossectomy defect.  Final path :  [id number] (+microscopic SALINAS, single contralateral node), superior soft tissue margin of left neck with tumor.  2/28/22 start chemoradiation  Finished chemo with cisplatin 4/6. Was going to complete final dose of radiation tomorrow.     -Rad/onc consulted for radiation while inpatient  4/22  s/p radiation oncology eval -on adjuvant chemoradiation, completed 31/33 fractions of RT.  renitiation of RT  inpatient when patient able to tolerate.    4/23  completed radiation sessions  5/1 - weaning oxygen, takes 5 liters at home and has all supplies.

## 2022-05-01 NOTE — ASSESSMENT & PLAN NOTE
Patient's with Normocytic anemia.. Hemoglobin stable. Etiology likely due to chronic disease .  Current CBC reviewed-    Recent Labs   Lab 04/29/22  0328 04/30/22  0931 05/01/22  0544   HGB 9.6* 11.1* 9.4*     Monitor CBC and transfuse if H/H drops below 7/21.

## 2022-05-01 NOTE — PROGRESS NOTES
[facility] - Tele[facility]  Progress Note    Patient Name: Fareed Richard Jr.  MRN: [id number]  Patient Class: IP- Inpatient   Admission Date: 4/19/2022  Length of Stay: 12 days  Attending Physician: Pam Bush MD  Primary Care Provider: Kodi Tubbs MD        Subjective:     Principal Problem:Acute on chronic respiratory failure        HPI:  Mr. 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 Per wife he has had several days of increasing lethargy, increased work of breathing and secretions. Yesterday she became particularly concerned when he became slightly less responsive and interactive. Wife also notes increased yellow thick secretions from trach site. Upon EMS arrival he was on his home 5 L by trach collar and saturating 93% with significant wheezing.  He received 1 DuoNeb by them.  He was also suctioned.     Currently patient is alert, following commands.  He is responding to yes no questions.  He denies any chest pain but does endorse shortness of breath and cough.  He denies any abdominal pain or pain elsewhere       ED course:   He was given 500ccs IVF as well as one time doses of vanc/cefepime/azithro. Labs significant for BNP of 1600, WBC 5k, K 5.6, Bicarb 35, VBG 7.26/87/25/40. Trop wnl. CXR with new cardiomegaly and pulmonary edema. Suctioned with return of copious secretions. ICU was consulted for acute hypoxemic respiratory failure and he will be admitted for further management.              Overview/Hospital Course:    Admitted to ICU, started on CAP coverage and solumedrol. Quickly weaned off vent after return of copious secretions on suction. Has been maintaining sats well on home O2. Rad/onc consulted for completion of radiology while inpatient. Pending culture sensitivity.    4/21 Transfer to hospital  medicine . 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  initially requiring vent but has now been stable on home trach collar for 24 hours. sats 93% on 8L via trach collar.  Awaiting cultures from sputum, on CAP coverage with ceftriaxone. completed Azithromycin. continue solumedrol 40mg IV daily.  rad/onc consulted this morning-he was supposed to complete radiation outpt but was admitted, attempting to set up for inpatient  4/22 changed to bolus GT feedings. s/p radiation oncology eval -on adjuvant chemoradiation, completed 31/33 fractions of RT.  renitiation of RT  inpatient when patient able to tolerate. sats 95% on trach collar 10/ fio2 60% . completed azithromycin.  started on Zosyn for possible aspiration  repeat CX ray-in the inferior hemithorax on the left side consistent with airspace consolidation/volume loss in the left lower lobe is again appreciated, but the lung zones are essentially stable since the prior exam and demonstrate no new areas of airspace consolidation or volume loss. respiratory culture 4/19 with pansensitive  pseudomonas.     4/23 intermittently refusing bolus GT feedings.stable on 10L/60% Fio2 . completed radiation sessions. off solumedrol  today. PT recs SNF  4/24 K and P replaced   4/25 sodium 150. started on D5W . repeat renal panel in PM . oxygen tapered to 8L/ Fio2 35%   4/26 K of 3.2  replaced   4/27 stable on 8L/ Fio2 35% . mucous plug remvove by suction , wants glycopyrrolate PRN due to dry mouth   Interval History:   4/28 - has trach, G tube, NPO, completed radiation treatments, 92% on 8 liters,+ stool and urinations. Weaning oxygen. Wants to go home w HH. Discussed his care and condition with his wife.    4/29 - still requiring 8 liters per NC. Sats low 90s. I lowered oxygen to 5 liters while in the room and he tolerated it.  Keep weaning.   4/30 - back up  to 8 liters per NC, refused some Bolus TFs yesterday  5/1 - not able to wean oxygen.  He may need a LTAC for HF oxygen. Otherwise, progressing,. Sat up in chair yesterday, + urinations and BM.       Interval History: see above    Review of Systems   Constitutional:  Negative for activity change, appetite change, chills, fatigue and fever.        Trach and PEG   HENT:  Negative for congestion, facial swelling, hearing loss, nosebleeds, sore throat and trouble swallowing.         Denies pain   Eyes:  Negative for pain and redness.   Respiratory:  Negative for apnea, cough, choking, chest tightness, shortness of breath, wheezing and stridor.    Cardiovascular:  Negative for chest pain, palpitations and leg swelling.   Gastrointestinal:  Negative for abdominal distention, abdominal pain, blood in stool, constipation, diarrhea, nausea and vomiting.   Genitourinary:  Negative for difficulty urinating, dysuria, flank pain, hematuria and urgency.   Musculoskeletal:  Negative for arthralgias, back pain, gait problem, neck pain and neck stiffness.   Skin:  Negative for color change, pallor and rash.   Neurological:  Negative for dizziness, tremors, syncope, facial asymmetry, weakness, light-headedness, numbness and headaches.   Psychiatric/Behavioral:  Negative for agitation, behavioral problems, confusion, decreased concentration, hallucinations, self-injury and sleep disturbance. The patient is not nervous/anxious and is not hyperactive.    Objective:     Vital Signs (Most Recent):  Temp: 98.5 °F (36.9 °C) (05/01/22 0404)  Pulse: 93 (05/01/22 0802)  Resp: (!) 21 (05/01/22 0802)  BP: (!) 147/93 (05/01/22 0733)  SpO2: 96 % (05/01/22 0802)   Vital Signs (24h Range):  Temp:  [97.6 °F (36.4 °C)-98.5 °F (36.9 °C)] 98.5 °F (36.9 °C)  Pulse:  [] 93  Resp:  [14-27] 21  SpO2:  [88 %-98 %] 96 %  BP: (108-147)/(70-93) 147/93     Weight: 62.4 kg (137 lb 9.1 oz)  Body mass index is 20.92 kg/m².    Intake/Output Summary (Last 24  hours) at 5/1/2022 0848  Last data filed at 4/30/2022 1644  Gross per 24 hour   Intake 1135 ml   Output 100 ml   Net 1035 ml        Physical Exam  Constitutional:       General: He is not in acute distress.     Appearance: Normal appearance. He is not ill-appearing or diaphoretic.      Comments: Frail elderly, trach and PEG   HENT:      Head: Normocephalic and atraumatic.      Comments: Thin in the face     Nose: Nose normal.      Mouth/Throat:      Mouth: Mucous membranes are moist.      Pharynx: Oropharynx is clear.   Eyes:      General: No scleral icterus.     Extraocular Movements: Extraocular movements intact.      Conjunctiva/sclera: Conjunctivae normal.      Pupils: Pupils are equal, round, and reactive to light.   Cardiovascular:      Rate and Rhythm: Normal rate and regular rhythm.      Pulses: Normal pulses.      Heart sounds: Normal heart sounds.   Pulmonary:      Effort: Pulmonary effort is normal. No respiratory distress.      Breath sounds: Normal breath sounds. No wheezing, rhonchi or rales.   Abdominal:      General: Abdomen is flat. Bowel sounds are normal. There is no distension.      Palpations: Abdomen is soft.      Tenderness: There is no abdominal tenderness. There is no right CVA tenderness or guarding.   Musculoskeletal:         General: No swelling, tenderness or deformity. Normal range of motion.      Cervical back: Normal range of motion and neck supple. No rigidity or tenderness.   Skin:     General: Skin is warm and dry.      Coloration: Skin is not jaundiced.      Findings: No rash.   Neurological:      General: No focal deficit present.      Mental Status: He is alert and oriented to person, place, and time. Mental status is at baseline.      Motor: No weakness.   Psychiatric:         Mood and Affect: Mood normal.         Behavior: Behavior normal.         Thought Content: Thought content normal.         Judgment: Judgment normal.       Significant Labs: All pertinent labs within the  past 24 hours have been reviewed.  CBC:   Recent Labs   Lab 04/30/22  0931 05/01/22  0544   WBC 7.60 6.12   HGB 11.1* 9.4*   HCT 35.7* 29.9*    178       CMP:   Recent Labs   Lab 04/30/22  0931 05/01/22  0542    138   K 4.0 3.8   CL 98 100   CO2 28 29   * 111*   BUN 15 18   CREATININE 0.6 0.6   CALCIUM 9.4 8.9   PROT 6.4 5.7*   ALBUMIN 2.6* 2.3*   BILITOT 0.2 0.2   ALKPHOS 76 69   AST 15 13   ALT 13 13   ANIONGAP 11 9   EGFRNONAA >60.0 >60.0         Significant Imaging: I have reviewed all pertinent imaging results/findings within the past 24 hours.      Assessment/Plan:      * Acute on chronic respiratory failure  Pt with SCC of the tongue s/p total glossectomy, chemowith cisplatin, and nearing completion of radiation with trach, COPD, asthma, HTN, CAD s/p PCI presenting for acute on chronic hypoxemic respiratory failure. He is on 5L with trach collar at home. He has had increased yellow secretions, work of breathing and SOB for the past several days. BNP elevated to 1600 on admission with pulmonary edema and new cardiomegaly on CXR. Trop wnl. Bicarb is elevated suggesting chronic hypercapnia. Suctioning has cleared thick mucous. He was able to be weaned down to his home O2, however after ~ 20 minutes he desats again, requiring deep suction with resolution of hypoxia. Now stable on home O2. Diuresed -3L     -Pt stable on home O2 for 24 hours (5 liters per NC), ready to stepdown to floor  - CAP coverage with ceftriaxone/azithro  - Methylprednisolone 40 mg x5 days for possible element of COPD exacerbation  - Duonebs q4  - Hypertonic saline nebs  - Chest PT  - Suction PRN    4/21 Transfer to hospital medicine . 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  initially requiring vent but has now been stable on home trach collar for 24 hours. sats 93% on 8L via trach collar.   Awaiting cultures from sputum, on CAP coverage with ceftriaxone. completed Azithromycin. continue solumedrol 40mg IV daily.   4/22 sats 95% on trach collar 10/ fio2 60% . completed azithromycin.   started on Zosyn for possible aspiration  repeat CX ray-in the inferior hemithorax on the left side consistent with airspace consolidation/volume loss in the left lower lobe is again appreciated, but the lung zones are essentially stable since the prior exam and demonstrate no new areas of airspace consolidation or volume loss.  respiratory culture 4/19 with pseudomonas.     4/30-  oxygen tapered to 8L/ Fio2 35% .  Needs to be at 5 liters to go home. Add guaifenisen for cough and secertion. Up in chair as much as possible  5/1 - not able to wean oxygen.  He may need a LTAC for HF oxygen.     Aspiration pneumonia  4/22 sats 95% on trach collar 10/ fio2 60% . completed azithromycin.   started on Zosyn for possible aspiration  repeat CX ray-in the inferior hemithorax on the left side consistent with airspace consolidation/volume loss in the left lower lobe is again appreciated, but the lung zones are essentially stable since the prior exam and demonstrate no new areas of airspace consolidation or volume loss.  respiratory culture 4/19 with pseudomonas.   -  completed zosyn    Squamous cell cancer of tongue    12/15/21 FNA left neck mass : squamous cell carcinoma, p16 negative  1/4/22 total glossectomy, bilateral neck dissection, bilateral cervical facial advancement flaps and anterolateral thigh free flap reconstruction of his glossectomy defect.  Final path :  [id number] (+microscopic SALINAS, single contralateral node), superior soft tissue margin of left neck with tumor.  2/28/22 start chemoradiation  Finished chemo with cisplatin 4/6. Was going to complete final dose of radiation tomorrow.     -Rad/onc consulted for radiation while inpatient  4/22  s/p radiation oncology eval -on adjuvant chemoradiation, completed 31/33  fractions of RT.  renitiation of RT  inpatient when patient able to tolerate.    4/23  completed radiation sessions  5/1 - weaning oxygen, takes 5 liters at home and has all supplies.     Dysphagia  Nutrition consulted. Most recent weight and BMI monitored-          Malnutrition (Moderate to Severe)  Weight Loss (Malnutrition): greater than 10% in 6 months                 Measurements:  Wt Readings from Last 1 Encounters:   04/27/22 62.4 kg (137 lb 9.1 oz)   Body mass index is 20.92 kg/m².    Recommendations: Recommendation/Intervention: 1.  Goals: Meet % EEN, EPN by RD f/u date    Patient has been screened and assessed by RD. RD will follow patient.    4/21 secondary to above. on G tube feedings   4/22 changed to bolus GT feedings.   4/29 - pt noted to sometimes refuse TFs. , on bolus TFs, no water added      Hypernatremia  4/25 sodium 150. started on D5W . repeat renal panel in PM   5/1- sodium trended down to 145 -> 140, on TFs -> 138, not receiving extra water    Hypophosphatemia  Replaced  4/29- stable      Hypokalemia  replaced   5/1 - stable      Chronic respiratory failure with hypoxia, on home O2 therapy  secondary to COPD. on 5L oxygen  at home  5/1  - continue weaning efforts, currently on 8 liters    Protein-calorie malnutrition  Nutrition consulted. Most recent weight and BMI monitored-          Malnutrition (Moderate to Severe)  Weight Loss (Malnutrition): greater than 10% in 6 months              Measurements:  Wt Readings from Last 1 Encounters:   04/27/22 62.4 kg (137 lb 9.1 oz)   Body mass index is 20.92 kg/m².    Recommendations: Recommendation/Intervention: 1.  Goals: Meet % EEN, EPN by RD f/u date    Patient has been screened and assessed by RD. RD will follow patient.  See above      Normocytic anemia    Patient's with Normocytic anemia.. Hemoglobin stable. Etiology likely due to chronic disease .  Current CBC reviewed-    Recent Labs   Lab 04/29/22  0328 04/30/22  0931  05/01/22  0544   HGB 9.6* 11.1* 9.4*     Monitor CBC and transfuse if H/H drops below 7/21.       Other hyperlipidemia  Continue home statin         Primary hypertension  Chronic, controlled.  Latest blood pressure and vitals reviewed-   Temp:  [97.6 °F (36.4 °C)-98.5 °F (36.9 °C)]   Pulse:  []   Resp:  [14-27]   BP: (108-147)/(70-93)   SpO2:  [88 %-98 %] .   Home meds for hypertension were reviewed- amlodipine, losartan and metoprolol  held for now  PRN meds if BP> 180/110 mm HG  4/28 - not requiring scheduled meds      Coronary artery disease involving native coronary artery of native heart without angina pectoris  Continue ASA, plavix, and statin   -stable    Goals of care, counseling/discussion  Advance Care Planning      Does patient have Capacity? Yes  Contact: Bridgett mitchell, wife  Goals/Wishes: wants to go home, HH w Home PT  Code Status- FULL  Pain management- seems comfortable without pain  Prognosis- s/p radiation, progressing cancer, high risk for aspiration, severe malnutrition  Family discussions-  4/28, 4/29 - discussed care and condition w pt and his wife.  He can talk w trach collar but sats drop.  He wants to go home. Has all oxygen supplies at home. Pt completed zosyn.    Functional Status - has trach and peg. recommended outpt f/u palliative care    Post acute care plan: pt would benefit from outpt referral to Palliative care, plan for HH and home.    5/1 - not able to wean oxygen.  He may need a LTAC for HF oxygen.       Sinus tachycardia          VTE Risk Mitigation (From admission, onward)         Ordered     enoxaparin injection 40 mg  Daily         04/19/22 1123     IP VTE HIGH RISK PATIENT  Once         04/19/22 1123     Place sequential compression device  Until discontinued         04/19/22 1123                Discharge Planning   NISA: 5/2/2022     Code Status: Full Code   Is the patient medically ready for discharge?: No    Reason for patient still in hospital (select all that apply):  Patient trending condition  Discharge Plan A: Home, Home Health   Discharge Delays: None known at this time      Pam Bush MD  Department of Hospital Medicine   [facility] - Telemetry Stepdown

## 2022-05-01 NOTE — SUBJECTIVE & OBJECTIVE
Interval History: see above    Review of Systems   Constitutional:  Negative for activity change, appetite change, chills, fatigue and fever.        Trach and PEG   HENT:  Negative for congestion, facial swelling, hearing loss, nosebleeds, sore throat and trouble swallowing.         Denies pain   Eyes:  Negative for pain and redness.   Respiratory:  Negative for apnea, cough, choking, chest tightness, shortness of breath, wheezing and stridor.    Cardiovascular:  Negative for chest pain, palpitations and leg swelling.   Gastrointestinal:  Negative for abdominal distention, abdominal pain, blood in stool, constipation, diarrhea, nausea and vomiting.   Genitourinary:  Negative for difficulty urinating, dysuria, flank pain, hematuria and urgency.   Musculoskeletal:  Negative for arthralgias, back pain, gait problem, neck pain and neck stiffness.   Skin:  Negative for color change, pallor and rash.   Neurological:  Negative for dizziness, tremors, syncope, facial asymmetry, weakness, light-headedness, numbness and headaches.   Psychiatric/Behavioral:  Negative for agitation, behavioral problems, confusion, decreased concentration, hallucinations, self-injury and sleep disturbance. The patient is not nervous/anxious and is not hyperactive.    Objective:     Vital Signs (Most Recent):  Temp: 98.5 °F (36.9 °C) (05/01/22 0404)  Pulse: 93 (05/01/22 0802)  Resp: (!) 21 (05/01/22 0802)  BP: (!) 147/93 (05/01/22 0733)  SpO2: 96 % (05/01/22 0802)   Vital Signs (24h Range):  Temp:  [97.6 °F (36.4 °C)-98.5 °F (36.9 °C)] 98.5 °F (36.9 °C)  Pulse:  [] 93  Resp:  [14-27] 21  SpO2:  [88 %-98 %] 96 %  BP: (108-147)/(70-93) 147/93     Weight: 62.4 kg (137 lb 9.1 oz)  Body mass index is 20.92 kg/m².    Intake/Output Summary (Last 24 hours) at 5/1/2022 0848  Last data filed at 4/30/2022 1644  Gross per 24 hour   Intake 1135 ml   Output 100 ml   Net 1035 ml        Physical Exam  Constitutional:       General: He is not in acute  distress.     Appearance: Normal appearance. He is not ill-appearing or diaphoretic.      Comments: Frail elderly, trach and PEG   HENT:      Head: Normocephalic and atraumatic.      Comments: Thin in the face     Nose: Nose normal.      Mouth/Throat:      Mouth: Mucous membranes are moist.      Pharynx: Oropharynx is clear.   Eyes:      General: No scleral icterus.     Extraocular Movements: Extraocular movements intact.      Conjunctiva/sclera: Conjunctivae normal.      Pupils: Pupils are equal, round, and reactive to light.   Cardiovascular:      Rate and Rhythm: Normal rate and regular rhythm.      Pulses: Normal pulses.      Heart sounds: Normal heart sounds.   Pulmonary:      Effort: Pulmonary effort is normal. No respiratory distress.      Breath sounds: Normal breath sounds. No wheezing, rhonchi or rales.   Abdominal:      General: Abdomen is flat. Bowel sounds are normal. There is no distension.      Palpations: Abdomen is soft.      Tenderness: There is no abdominal tenderness. There is no right CVA tenderness or guarding.   Musculoskeletal:         General: No swelling, tenderness or deformity. Normal range of motion.      Cervical back: Normal range of motion and neck supple. No rigidity or tenderness.   Skin:     General: Skin is warm and dry.      Coloration: Skin is not jaundiced.      Findings: No rash.   Neurological:      General: No focal deficit present.      Mental Status: He is alert and oriented to person, place, and time. Mental status is at baseline.      Motor: No weakness.   Psychiatric:         Mood and Affect: Mood normal.         Behavior: Behavior normal.         Thought Content: Thought content normal.         Judgment: Judgment normal.       Significant Labs: All pertinent labs within the past 24 hours have been reviewed.  CBC:   Recent Labs   Lab 04/30/22  0931 05/01/22  0544   WBC 7.60 6.12   HGB 11.1* 9.4*   HCT 35.7* 29.9*    178       CMP:   Recent Labs   Lab  04/30/22  0931 05/01/22  0542    138   K 4.0 3.8   CL 98 100   CO2 28 29   * 111*   BUN 15 18   CREATININE 0.6 0.6   CALCIUM 9.4 8.9   PROT 6.4 5.7*   ALBUMIN 2.6* 2.3*   BILITOT 0.2 0.2   ALKPHOS 76 69   AST 15 13   ALT 13 13   ANIONGAP 11 9   EGFRNONAA >60.0 >60.0         Significant Imaging: I have reviewed all pertinent imaging results/findings within the past 24 hours.

## 2022-05-02 LAB
ALBUMIN SERPL BCP-MCNC: 2.4 G/DL (ref 3.5–5.2)
ALP SERPL-CCNC: 68 U/L (ref 55–135)
ALT SERPL W/O P-5'-P-CCNC: 12 U/L (ref 10–44)
ANION GAP SERPL CALC-SCNC: 7 MMOL/L (ref 8–16)
AST SERPL-CCNC: 17 U/L (ref 10–40)
BASOPHILS # BLD AUTO: 0.02 K/UL (ref 0–0.2)
BASOPHILS NFR BLD: 0.4 % (ref 0–1.9)
BILIRUB SERPL-MCNC: 0.2 MG/DL (ref 0.1–1)
BUN SERPL-MCNC: 15 MG/DL (ref 8–23)
CALCIUM SERPL-MCNC: 8.8 MG/DL (ref 8.7–10.5)
CHLORIDE SERPL-SCNC: 102 MMOL/L (ref 95–110)
CO2 SERPL-SCNC: 30 MMOL/L (ref 23–29)
CREAT SERPL-MCNC: 0.6 MG/DL (ref 0.5–1.4)
DIFFERENTIAL METHOD: ABNORMAL
EOSINOPHIL # BLD AUTO: 0.1 K/UL (ref 0–0.5)
EOSINOPHIL NFR BLD: 0.9 % (ref 0–8)
ERYTHROCYTE [DISTWIDTH] IN BLOOD BY AUTOMATED COUNT: 16.9 % (ref 11.5–14.5)
EST. GFR  (AFRICAN AMERICAN): >60 ML/MIN/1.73 M^2
EST. GFR  (NON AFRICAN AMERICAN): >60 ML/MIN/1.73 M^2
GLUCOSE SERPL-MCNC: 102 MG/DL (ref 70–110)
HCT VFR BLD AUTO: 30 % (ref 40–54)
HGB BLD-MCNC: 9.5 G/DL (ref 14–18)
IMM GRANULOCYTES # BLD AUTO: 0.02 K/UL (ref 0–0.04)
IMM GRANULOCYTES NFR BLD AUTO: 0.4 % (ref 0–0.5)
LYMPHOCYTES # BLD AUTO: 0.5 K/UL (ref 1–4.8)
LYMPHOCYTES NFR BLD: 9.3 % (ref 18–48)
MAGNESIUM SERPL-MCNC: 1.9 MG/DL (ref 1.6–2.6)
MCH RBC QN AUTO: 29.4 PG (ref 27–31)
MCHC RBC AUTO-ENTMCNC: 31.7 G/DL (ref 32–36)
MCV RBC AUTO: 93 FL (ref 82–98)
MONOCYTES # BLD AUTO: 0.6 K/UL (ref 0.3–1)
MONOCYTES NFR BLD: 11.9 % (ref 4–15)
NEUTROPHILS # BLD AUTO: 4.1 K/UL (ref 1.8–7.7)
NEUTROPHILS NFR BLD: 77.1 % (ref 38–73)
NRBC BLD-RTO: 0 /100 WBC
PHOSPHATE SERPL-MCNC: 3.8 MG/DL (ref 2.7–4.5)
PLATELET # BLD AUTO: 195 K/UL (ref 150–450)
PMV BLD AUTO: 9.5 FL (ref 9.2–12.9)
POTASSIUM SERPL-SCNC: 4.2 MMOL/L (ref 3.5–5.1)
PROT SERPL-MCNC: 5.6 G/DL (ref 6–8.4)
RBC # BLD AUTO: 3.23 M/UL (ref 4.6–6.2)
SARS-COV-2 RNA RESP QL NAA+PROBE: NOT DETECTED
SODIUM SERPL-SCNC: 139 MMOL/L (ref 136–145)
WBC # BLD AUTO: 5.36 K/UL (ref 3.9–12.7)

## 2022-05-02 PROCEDURE — 84100 ASSAY OF PHOSPHORUS: CPT | Performed by: HOSPITALIST

## 2022-05-02 PROCEDURE — 25000242 PHARM REV CODE 250 ALT 637 W/ HCPCS: Performed by: HOSPITALIST

## 2022-05-02 PROCEDURE — 99900035 HC TECH TIME PER 15 MIN (STAT)

## 2022-05-02 PROCEDURE — U0003 INFECTIOUS AGENT DETECTION BY NUCLEIC ACID (DNA OR RNA); SEVERE ACUTE RESPIRATORY SYNDROME CORONAVIRUS 2 (SARS-COV-2) (CORONAVIRUS DISEASE [COVID-19]), AMPLIFIED PROBE TECHNIQUE, MAKING USE OF HIGH THROUGHPUT TECHNOLOGIES AS DESCRIBED BY CMS-2020-01-R: HCPCS | Performed by: HOSPITALIST

## 2022-05-02 PROCEDURE — 94640 AIRWAY INHALATION TREATMENT: CPT

## 2022-05-02 PROCEDURE — 99233 SBSQ HOSP IP/OBS HIGH 50: CPT | Mod: ,,, | Performed by: HOSPITALIST

## 2022-05-02 PROCEDURE — 83735 ASSAY OF MAGNESIUM: CPT | Performed by: HOSPITALIST

## 2022-05-02 PROCEDURE — 25000003 PHARM REV CODE 250: Performed by: STUDENT IN AN ORGANIZED HEALTH CARE EDUCATION/TRAINING PROGRAM

## 2022-05-02 PROCEDURE — 80053 COMPREHEN METABOLIC PANEL: CPT | Performed by: HOSPITALIST

## 2022-05-02 PROCEDURE — 85025 COMPLETE CBC W/AUTO DIFF WBC: CPT | Performed by: HOSPITALIST

## 2022-05-02 PROCEDURE — U0005 INFEC AGEN DETEC AMPLI PROBE: HCPCS | Performed by: HOSPITALIST

## 2022-05-02 PROCEDURE — 25000003 PHARM REV CODE 250: Performed by: HOSPITALIST

## 2022-05-02 PROCEDURE — 99900026 HC AIRWAY MAINTENANCE (STAT)

## 2022-05-02 PROCEDURE — 63600175 PHARM REV CODE 636 W HCPCS: Performed by: INTERNAL MEDICINE

## 2022-05-02 PROCEDURE — 99233 PR SUBSEQUENT HOSPITAL CARE,LEVL III: ICD-10-PCS | Mod: ,,, | Performed by: HOSPITALIST

## 2022-05-02 PROCEDURE — 27000221 HC OXYGEN, UP TO 24 HOURS

## 2022-05-02 PROCEDURE — 94668 MNPJ CHEST WALL SBSQ: CPT

## 2022-05-02 PROCEDURE — 20600001 HC STEP DOWN PRIVATE ROOM

## 2022-05-02 PROCEDURE — 25000242 PHARM REV CODE 250 ALT 637 W/ HCPCS: Performed by: INTERNAL MEDICINE

## 2022-05-02 PROCEDURE — 94761 N-INVAS EAR/PLS OXIMETRY MLT: CPT

## 2022-05-02 RX ORDER — GUAIFENESIN 100 MG/5ML
200 SOLUTION ORAL EVERY 8 HOURS
Status: CANCELLED | OUTPATIENT
Start: 2022-05-02

## 2022-05-02 RX ORDER — FOLIC ACID 1 MG/1
1 TABLET ORAL DAILY
Status: CANCELLED | OUTPATIENT
Start: 2022-05-03

## 2022-05-02 RX ORDER — POLYETHYLENE GLYCOL 3350 17 G/17G
17 POWDER, FOR SOLUTION ORAL 2 TIMES DAILY
Status: CANCELLED | OUTPATIENT
Start: 2022-05-02

## 2022-05-02 RX ORDER — BISACODYL 10 MG
10 SUPPOSITORY, RECTAL RECTAL DAILY PRN
Status: CANCELLED | OUTPATIENT
Start: 2022-05-02

## 2022-05-02 RX ORDER — CLOPIDOGREL BISULFATE 75 MG/1
75 TABLET ORAL DAILY
Status: CANCELLED | OUTPATIENT
Start: 2022-05-03

## 2022-05-02 RX ORDER — ENOXAPARIN SODIUM 100 MG/ML
40 INJECTION SUBCUTANEOUS EVERY 24 HOURS
Status: CANCELLED | OUTPATIENT
Start: 2022-05-02

## 2022-05-02 RX ORDER — IPRATROPIUM BROMIDE AND ALBUTEROL SULFATE 2.5; .5 MG/3ML; MG/3ML
3 SOLUTION RESPIRATORY (INHALATION) EVERY 4 HOURS PRN
Status: CANCELLED | OUTPATIENT
Start: 2022-05-02

## 2022-05-02 RX ORDER — ATORVASTATIN CALCIUM 20 MG/1
20 TABLET, FILM COATED ORAL DAILY
Status: CANCELLED | OUTPATIENT
Start: 2022-05-03

## 2022-05-02 RX ORDER — NAPROXEN SODIUM 220 MG/1
81 TABLET, FILM COATED ORAL DAILY
Status: CANCELLED | OUTPATIENT
Start: 2022-05-03

## 2022-05-02 RX ORDER — SODIUM CHLORIDE FOR INHALATION 3 %
4 VIAL, NEBULIZER (ML) INHALATION EVERY 8 HOURS
Status: CANCELLED | OUTPATIENT
Start: 2022-05-02

## 2022-05-02 RX ORDER — SCOLOPAMINE TRANSDERMAL SYSTEM 1 MG/1
1 PATCH, EXTENDED RELEASE TRANSDERMAL
Status: CANCELLED | OUTPATIENT
Start: 2022-05-04

## 2022-05-02 RX ORDER — ONDANSETRON 8 MG/1
8 TABLET, ORALLY DISINTEGRATING ORAL EVERY 8 HOURS PRN
Status: CANCELLED | OUTPATIENT
Start: 2022-05-02

## 2022-05-02 RX ORDER — IPRATROPIUM BROMIDE AND ALBUTEROL SULFATE 2.5; .5 MG/3ML; MG/3ML
3 SOLUTION RESPIRATORY (INHALATION) EVERY 4 HOURS
Status: CANCELLED | OUTPATIENT
Start: 2022-05-02

## 2022-05-02 RX ORDER — THIAMINE HCL 100 MG
100 TABLET ORAL DAILY
Status: CANCELLED | OUTPATIENT
Start: 2022-05-03

## 2022-05-02 RX ORDER — GLYCOPYRROLATE 1 MG/5ML
2 SOLUTION ORAL 4 TIMES DAILY PRN
Status: CANCELLED | OUTPATIENT
Start: 2022-05-02

## 2022-05-02 RX ORDER — TALC
6 POWDER (GRAM) TOPICAL NIGHTLY
Status: CANCELLED | OUTPATIENT
Start: 2022-05-02

## 2022-05-02 RX ORDER — SODIUM CHLORIDE 0.9 % (FLUSH) 0.9 %
10 SYRINGE (ML) INJECTION
Status: CANCELLED | OUTPATIENT
Start: 2022-05-02

## 2022-05-02 RX ORDER — OXYCODONE HCL 5 MG/5 ML
5 SOLUTION, ORAL ORAL EVERY 4 HOURS PRN
Status: CANCELLED | OUTPATIENT
Start: 2022-05-02

## 2022-05-02 RX ADMIN — GLYCOPYRROLATE 2 MG: 1 LIQUID ORAL at 10:05

## 2022-05-02 RX ADMIN — MELATONIN TAB 3 MG 6 MG: 3 TAB at 09:05

## 2022-05-02 RX ADMIN — SODIUM CHLORIDE SOLN NEBU 3% 4 ML: 3 NEBU SOLN at 03:05

## 2022-05-02 RX ADMIN — IPRATROPIUM BROMIDE AND ALBUTEROL SULFATE 3 ML: 2.5; .5 SOLUTION RESPIRATORY (INHALATION) at 07:05

## 2022-05-02 RX ADMIN — THIAMINE HCL TAB 100 MG 100 MG: 100 TAB at 08:05

## 2022-05-02 RX ADMIN — CLOPIDOGREL 75 MG: 75 TABLET, FILM COATED ORAL at 08:05

## 2022-05-02 RX ADMIN — ASPIRIN 81 MG CHEWABLE TABLET 81 MG: 81 TABLET CHEWABLE at 08:05

## 2022-05-02 RX ADMIN — IPRATROPIUM BROMIDE AND ALBUTEROL SULFATE 3 ML: 2.5; .5 SOLUTION RESPIRATORY (INHALATION) at 03:05

## 2022-05-02 RX ADMIN — IPRATROPIUM BROMIDE AND ALBUTEROL SULFATE 3 ML: 2.5; .5 SOLUTION RESPIRATORY (INHALATION) at 12:05

## 2022-05-02 RX ADMIN — ENOXAPARIN SODIUM 40 MG: 40 INJECTION SUBCUTANEOUS at 05:05

## 2022-05-02 RX ADMIN — ATORVASTATIN CALCIUM 20 MG: 20 TABLET, FILM COATED ORAL at 08:05

## 2022-05-02 RX ADMIN — GUAIFENESIN 200 MG: 100 SOLUTION ORAL at 02:05

## 2022-05-02 RX ADMIN — FOLIC ACID 1 MG: 1 TABLET ORAL at 08:05

## 2022-05-02 RX ADMIN — SODIUM CHLORIDE SOLN NEBU 3% 4 ML: 3 NEBU SOLN at 07:05

## 2022-05-02 RX ADMIN — IPRATROPIUM BROMIDE AND ALBUTEROL SULFATE 3 ML: 2.5; .5 SOLUTION RESPIRATORY (INHALATION) at 11:05

## 2022-05-02 RX ADMIN — GLYCOPYRROLATE 2 MG: 1 LIQUID ORAL at 09:05

## 2022-05-02 NOTE — ASSESSMENT & PLAN NOTE
12/15/21 FNA left neck mass : squamous cell carcinoma, p16 negative  1/4/22 total glossectomy, bilateral neck dissection, bilateral cervical facial advancement flaps and anterolateral thigh free flap reconstruction of his glossectomy defect.  Final path :  [id number] (+microscopic SALINAS, single contralateral node), superior soft tissue margin of left neck with tumor.  2/28/22 start chemoradiation  Finished chemo with cisplatin 4/6. Was going to complete final dose of radiation tomorrow.     -Rad/onc consulted for radiation while inpatient  4/22  s/p radiation oncology eval -on adjuvant chemoradiation, completed 31/33 fractions of RT.  renitiation of RT  inpatient when patient able to tolerate.    4/23  completed radiation sessions  5/1 - weaning oxygen, takes 5 liters at home and has all supplies.

## 2022-05-02 NOTE — PT/OT/SLP PROGRESS
Physical Therapy      Patient Name:  Fareed Richard Jr.   MRN:  [id number]    Attempted to see patient for PT; however patient refused to participate. Pt nodding head no upon arrival team and introduced self as therapist. Provided pt with options including following up at a later time on this date, however pt continued to shake head no. Physical therapy will follow-up on another date as able.    Beatriz Castro, PT, DPT, GCS  5/2/2022

## 2022-05-02 NOTE — ASSESSMENT & PLAN NOTE
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 He is on 5L with trach collar at home. He has had increased yellow secretions, work of breathing and SOB for the past several days. BNP elevated to 1600 on admission with pulmonary edema and new cardiomegaly on CXR. Trop wnl. Bicarb is elevated suggesting chronic hypercapnia. Suctioning has cleared thick mucous. He was able to be weaned down to his home O2, however after ~ 20 minutes he desats again, requiring deep suction with resolution of hypoxia. Now stable on home O2. Diuresed -3L     -Pt stable on home O2 for 24 hours (5 liters per NC), ready to stepdown to floor  - CAP coverage with ceftriaxone/azithro  - Methylprednisolone 40 mg x5 days for possible element of COPD exacerbation  - Duonebs q4  - Hypertonic saline nebs  - Chest PT  - Suction PRN    4/21 Transfer to hospital medicine . 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  initially requiring vent but has now been stable on home trach collar for 24 hours. sats 93% on 8L via trach collar.  Awaiting cultures from sputum, on CAP coverage with ceftriaxone. completed Azithromycin. continue solumedrol 40mg IV daily.   4/22 sats 95% on trach collar 10/ fio2 60% . completed azithromycin.   started on Zosyn for possible aspiration  repeat CX ray-in the inferior hemithorax on the left side consistent with airspace consolidation/volume loss in the left lower lobe is again appreciated, but the lung zones are essentially stable since the prior exam and demonstrate no new areas of airspace consolidation or volume loss.  respiratory culture 4/19 with pseudomonas.     4/30-  oxygen tapered to 8L/ Fio2 35% .  Needs to be at 5 liters to go  home. Add guaifenisen for cough and secertion. Up in chair as much as possible  5/1 - not able to wean oxygen.  He may need a LTAC for HF oxygen.   5/2 - on 8 liters NC which is home goal for him.

## 2022-05-02 NOTE — ASSESSMENT & PLAN NOTE
Patient's with Normocytic anemia.. Hemoglobin stable. Etiology likely due to chronic disease .  Current CBC reviewed-    Recent Labs   Lab 04/30/22  0931 05/01/22  0544 05/02/22  0632   HGB 11.1* 9.4* 9.5*     Monitor CBC and transfuse if H/H drops below 7/21.

## 2022-05-02 NOTE — RESPIRATORY THERAPY
RAPID RESPONSE RESPIRATORY THERAPY PROACTIVE NOTE           Time of visit: 727    Code Status: Full Code   : 1950  Bed: 8094/8094 A:   MRN: [id number]  Time spent at the bedside: < 15 min    SITUATION    Evaluated patient for: LDA Check     BACKGROUND    Patient has a past medical history of Cancer, COPD (chronic obstructive pulmonary disease), Hyperlipidemia, Hypertension, and Pseudoaneurysm.  Clinically Significant Surgical Hx: tracheostomy    24 Hours Vitals Range:  Temp:  [97 °F (36.1 °C)-98.3 °F (36.8 °C)]   Pulse:  []   Resp:  [19-25]   BP: (109-157)/(70-90)   SpO2:  [91 %-98 %]     Labs:    Recent Labs     22  0931 22  0542    138   K 4.0 3.8   CL 98 100   CO2 28 29   CREATININE 0.6 0.6   * 111*   PHOS 3.0 3.5   MG 1.9 2.0        No results for input(s): PH, PCO2, PO2, HCO3, POCSATURATED, BE in the last 72 hours.    ASSESSMENT/INTERVENTIONS    Upon arrival in room all trach supplies are at bedside including  A 4.0 and 6.0 shiley cuffed    Last VS   Temp: 97.6 °F (36.4 °C) (732)  Pulse: 96 (732)  Resp: 20 (732)  BP: 148/80 (732)  SpO2: 94 % (732)    Level of Consciousness: Level of Consciousness (AVPU): alert  Respiratory Effort: Respiratory Effort: Normal, Unlabored Expansion/Accessory Muscle Usage: Expansion/Accessory Muscles/Retractions: no use of accessory muscles  All Lung Field Breath Sounds: All Lung Fields Breath Sounds: Anterior:, Lateral:, diminished  DEE Breath Sounds: diminished  LLL Breath Sounds: diminished  RUL Breath Sounds: diminished  RML Breath Sounds: diminished  RLL Breath Sounds: diminished  O2 Device/Concentration: Flow (L/min): 8, Oxygen Concentration (%): 35, O2 Device (Oxygen Therapy): Trach Collar  Surgical airway: Yes, Type: Shiley Size: 6, uncuffed  Ambu at bedside: Ambu bag with the patient?: Yes, Adult Ambu     Active Orders   Respiratory Care    Chest physiotherapy TID     Frequency: TID     Number of  Occurrences: Until Specified     Order Questions:      Indications: COPIOUS SPUTUM PRODUCTION      Indications: ATELECTASIS PROPHYLAXIS      Indications: ATELECTASIS NONRESPONSIVE TO TX    Inhalation Treatment Q4H     Frequency: Q4H     Number of Occurrences: Until Specified    Oxygen Continuous     Frequency: Continuous     Number of Occurrences: Until Specified     Order Questions:      Device type: Low flow      Device: Trach Collar (Comment: 8L)      FiO2%: 35%      Titrate O2 per Oxygen Titration Protocol: Yes      To maintain SpO2 goal of: >= 90%      Notify MD of: Inability to achieve desired SpO2; Sudden change in patient status and requires 20% increase in FiO2; Patient requires >60% FiO2    Pulse Oximetry Continuous     Frequency: Continuous     Number of Occurrences: Until Specified    Routine tracheostomy care     Frequency: BID     Number of Occurrences: Until Specified       RECOMMENDATIONS    We recommend: RRT Recs: Continue POC per primary team.    ESCALATION        FOLLOW-UP    Please call back the Rapid Response RT, Su Avendano, RRT at x [phone] for any questions or concerns.

## 2022-05-02 NOTE — PLAN OF CARE
Problem: Adult Inpatient Plan of Care  Goal: Plan of Care Review  Outcome: Ongoing, Progressing  Goal: Patient-Specific Goal (Individualized)  Outcome: Ongoing, Progressing  Goal: Absence of Hospital-Acquired Illness or Injury  Outcome: Ongoing, Progressing  Goal: Optimal Comfort and Wellbeing  Outcome: Ongoing, Progressing  Goal: Readiness for Transition of Care  Outcome: Ongoing, Progressing   Alert and oriented x4. Vitals stable. 8L O2. Free from falls and injuries during shift. No concerns or requests voiced. Bed low with side rails up x2. Call bell within reach. Will continue plan of care.

## 2022-05-02 NOTE — PLAN OF CARE
Problem: Adult Inpatient Plan of Care  Goal: Plan of Care Review  Outcome: Ongoing, Progressing  Goal: Absence of Hospital-Acquired Illness or Injury  Outcome: Ongoing, Progressing  Goal: Optimal Comfort and Wellbeing  Outcome: Ongoing, Progressing  Goal: Readiness for Transition of Care  Outcome: Ongoing, Progressing     Problem: Adult Inpatient Plan of Care  Goal: Patient-Specific Goal (Individualized)  Outcome: Unable to Meet, Plan Revised     LTAC placement pending insurance auth, 8L 35%O2, possible dc tomorrow, peg tube feeds q4, vss, nad, cont poc.

## 2022-05-02 NOTE — PROGRESS NOTES
[facility] - Telemetry [facility] Medicine  Progress Note    Patient Name: Fareed Richard Jr.  MRN: [id number]  Patient Class: IP- Inpatient   Admission Date: 4/19/2022  Length of Stay: 13 days  Attending Physician: Pam Bush MD  Primary Care Provider: Kodi Tubbs MD        Subjective:     Principal Problem:Acute on chronic respiratory failure        HPI:  Mr. 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 Per wife he has had several days of increasing lethargy, increased work of breathing and secretions. Yesterday she became particularly concerned when he became slightly less responsive and interactive. Wife also notes increased yellow thick secretions from trach site. Upon EMS arrival he was on his home 5 L by trach collar and saturating 93% with significant wheezing.  He received 1 DuoNeb by them.  He was also suctioned.     Currently patient is alert, following commands.  He is responding to yes no questions.  He denies any chest pain but does endorse shortness of breath and cough.  He denies any abdominal pain or pain elsewhere       ED course:   He was given 500ccs IVF as well as one time doses of vanc/cefepime/azithro. Labs significant for BNP of 1600, WBC 5k, K 5.6, Bicarb 35, VBG 7.26/87/25/40. Trop wnl. CXR with new cardiomegaly and pulmonary edema. Suctioned with return of copious secretions. ICU was consulted for acute hypoxemic respiratory failure and he will be admitted for further management.              Overview/Hospital Course:    Admitted to ICU, started on CAP coverage and solumedrol. Quickly weaned off vent after return of copious secretions on suction. Has been maintaining sats well on home O2. Rad/onc consulted for completion of radiology while inpatient. Pending culture sensitivity.    4/21 Transfer to hospital  medicine . 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  initially requiring vent but has now been stable on home trach collar for 24 hours. sats 93% on 8L via trach collar.  Awaiting cultures from sputum, on CAP coverage with ceftriaxone. completed Azithromycin. continue solumedrol 40mg IV daily.  rad/onc consulted this morning-he was supposed to complete radiation outpt but was admitted, attempting to set up for inpatient  4/22 changed to bolus GT feedings. s/p radiation oncology eval -on adjuvant chemoradiation, completed 31/33 fractions of RT.  renitiation of RT  inpatient when patient able to tolerate. sats 95% on trach collar 10/ fio2 60% . completed azithromycin.  started on Zosyn for possible aspiration  repeat CX ray-in the inferior hemithorax on the left side consistent with airspace consolidation/volume loss in the left lower lobe is again appreciated, but the lung zones are essentially stable since the prior exam and demonstrate no new areas of airspace consolidation or volume loss. respiratory culture 4/19 with pansensitive  pseudomonas.     4/23 intermittently refusing bolus GT feedings.stable on 10L/60% Fio2 . completed radiation sessions. off solumedrol  today. PT recs SNF  4/24 K and P replaced   4/25 sodium 150. started on D5W . repeat renal panel in PM . oxygen tapered to 8L/ Fio2 35%   4/26 K of 3.2  replaced   4/27 stable on 8L/ Fio2 35% . mucous plug remvove by suction , wants glycopyrrolate PRN due to dry mouth   Interval History:   4/28 - has trach, G tube, NPO, completed radiation treatments, 92% on 8 liters,+ stool and urinations. Weaning oxygen. Wants to go home w HH. Discussed his care and condition with his wife.    4/29 - still requiring 8 liters per NC. Sats low 90s. I lowered oxygen to 5 liters while in the room and he tolerated it.  Keep weaning.   4/30 - back up  to 8 liters per NC, refused some Bolus TFs yesterday  5/1 - not able to wean oxygen.  He may need a LTAC for HF oxygen. Otherwise, progressing,. Sat up in chair yesterday, + urinations and BM.   5/2 - on 8 liters per NC this am. 92%.  His home goal is 5 liters. Pt tends to need more at night.  Plan is to keep him overnight on 5 liters to make sure he is safe for home oxygen.  He may benefit from LTAC a few days. Accepted to LTAC, waiting on ins auth.  Needs PT/OT for deconditioning.      Interval History: see above    Review of Systems   Constitutional:  Negative for activity change, appetite change, chills, fatigue and fever.        Trach and PEG  Generalized weakness   HENT:  Negative for congestion, facial swelling, hearing loss, nosebleeds, sore throat and trouble swallowing.         Denies pain   Eyes:  Negative for pain and redness.   Respiratory:  Negative for apnea, cough, choking, chest tightness, shortness of breath, wheezing and stridor.    Cardiovascular:  Negative for chest pain, palpitations and leg swelling.   Gastrointestinal:  Negative for abdominal distention, abdominal pain, blood in stool, constipation, diarrhea, nausea and vomiting.   Genitourinary:  Negative for difficulty urinating, dysuria, flank pain, hematuria and urgency.   Musculoskeletal:  Negative for arthralgias, back pain, gait problem, neck pain and neck stiffness.   Skin:  Negative for color change, pallor and rash.   Neurological:  Negative for dizziness, tremors, syncope, facial asymmetry, weakness, light-headedness, numbness and headaches.   Psychiatric/Behavioral:  Negative for agitation, behavioral problems, confusion, decreased concentration, hallucinations, self-injury and sleep disturbance. The patient is not nervous/anxious and is not hyperactive.    Objective:     Vital Signs (Most Recent):  Temp: 97.6 °F (36.4 °C) (05/02/22 0732)  Pulse: 90 (05/02/22 0746)  Resp: 16 (05/02/22 0746)  BP: (!) 148/80 (05/02/22 0732)  SpO2:  (!) 92 % (05/02/22 0746)   Vital Signs (24h Range):  Temp:  [97 °F (36.1 °C)-98.3 °F (36.8 °C)] 97.6 °F (36.4 °C)  Pulse:  [] 90  Resp:  [16-25] 16  SpO2:  [91 %-98 %] 92 %  BP: (109-157)/(70-90) 148/80     Weight: 62.4 kg (137 lb 9.1 oz)  Body mass index is 20.92 kg/m².    Intake/Output Summary (Last 24 hours) at 5/2/2022 0840  Last data filed at 5/2/2022 0733  Gross per 24 hour   Intake 810 ml   Output 450 ml   Net 360 ml        Physical Exam  Constitutional:       General: He is not in acute distress.     Appearance: Normal appearance. He is not ill-appearing or diaphoretic.      Comments: Frail elderly, trach and PEG   HENT:      Head: Normocephalic and atraumatic.      Comments: Thin in the face     Nose: Nose normal.      Mouth/Throat:      Mouth: Mucous membranes are moist.      Pharynx: Oropharynx is clear.   Eyes:      General: No scleral icterus.     Extraocular Movements: Extraocular movements intact.      Conjunctiva/sclera: Conjunctivae normal.      Pupils: Pupils are equal, round, and reactive to light.   Cardiovascular:      Rate and Rhythm: Normal rate and regular rhythm.      Pulses: Normal pulses.      Heart sounds: Normal heart sounds.   Pulmonary:      Effort: Pulmonary effort is normal. No respiratory distress.      Breath sounds: Normal breath sounds. No wheezing, rhonchi or rales.   Abdominal:      General: Abdomen is flat. Bowel sounds are normal. There is no distension.      Palpations: Abdomen is soft.      Tenderness: There is no abdominal tenderness. There is no right CVA tenderness or guarding.   Musculoskeletal:         General: No swelling, tenderness or deformity. Normal range of motion.      Cervical back: Normal range of motion and neck supple. No rigidity or tenderness.      Comments: atrophy   Skin:     General: Skin is warm and dry.      Coloration: Skin is not jaundiced.      Findings: No rash.   Neurological:      General: No focal deficit present.      Mental Status: He  is alert and oriented to person, place, and time. Mental status is at baseline.      Motor: No weakness.   Psychiatric:         Mood and Affect: Mood normal.         Behavior: Behavior normal.         Thought Content: Thought content normal.         Judgment: Judgment normal.       Significant Labs: All pertinent labs within the past 24 hours have been reviewed.  CBC:   Recent Labs   Lab 04/30/22  0931 05/01/22  0544 05/02/22  0632   WBC 7.60 6.12 5.36   HGB 11.1* 9.4* 9.5*   HCT 35.7* 29.9* 30.0*    178 195       CMP:   Recent Labs   Lab 04/30/22  0931 05/01/22  0542 05/02/22  0632    138 139   K 4.0 3.8 4.2   CL 98 100 102   CO2 28 29 30*   * 111* 102   BUN 15 18 15   CREATININE 0.6 0.6 0.6   CALCIUM 9.4 8.9 8.8   PROT 6.4 5.7* 5.6*   ALBUMIN 2.6* 2.3* 2.4*   BILITOT 0.2 0.2 0.2   ALKPHOS 76 69 68   AST 15 13 17   ALT 13 13 12   ANIONGAP 11 9 7*   EGFRNONAA >60.0 >60.0 >60.0         Significant Imaging: I have reviewed all pertinent imaging results/findings within the past 24 hours.      Assessment/Plan:      * Acute on chronic respiratory failure  Pt with SCC of the tongue s/p total glossectomy, chemowith cisplatin, and nearing completion of radiation with trach, COPD, asthma, HTN, CAD s/p PCI presenting for acute on chronic hypoxemic respiratory failure. He is on 5L with trach collar at home. He has had increased yellow secretions, work of breathing and SOB for the past several days. BNP elevated to 1600 on admission with pulmonary edema and new cardiomegaly on CXR. Trop wnl. Bicarb is elevated suggesting chronic hypercapnia. Suctioning has cleared thick mucous. He was able to be weaned down to his home O2, however after ~ 20 minutes he desats again, requiring deep suction with resolution of hypoxia. Now stable on home O2. Diuresed -3L     -Pt stable on home O2 for 24 hours (5 liters per NC), ready to stepdown to floor  - CAP coverage with ceftriaxone/azithro  - Methylprednisolone 40 mg x5  days for possible element of COPD exacerbation  - Duonebs q4  - Hypertonic saline nebs  - Chest PT  - Suction PRN    4/21 Transfer to hospital medicine . 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  initially requiring vent but has now been stable on home trach collar for 24 hours. sats 93% on 8L via trach collar.  Awaiting cultures from sputum, on CAP coverage with ceftriaxone. completed Azithromycin. continue solumedrol 40mg IV daily.   4/22 sats 95% on trach collar 10/ fio2 60% . completed azithromycin.   started on Zosyn for possible aspiration  repeat CX ray-in the inferior hemithorax on the left side consistent with airspace consolidation/volume loss in the left lower lobe is again appreciated, but the lung zones are essentially stable since the prior exam and demonstrate no new areas of airspace consolidation or volume loss.  respiratory culture 4/19 with pseudomonas.     4/30-  oxygen tapered to 8L/ Fio2 35% .  Needs to be at 5 liters to go home. Add guaifenisen for cough and secertion. Up in chair as much as possible  5/1 - not able to wean oxygen.  He may need a LTAC for HF oxygen.   5/2 - on 8 liters NC which is home goal for him.     Aspiration pneumonia  4/22 sats 95% on trach collar 10/ fio2 60% . completed azithromycin.   started on Zosyn for possible aspiration  repeat CX ray-in the inferior hemithorax on the left side consistent with airspace consolidation/volume loss in the left lower lobe is again appreciated, but the lung zones are essentially stable since the prior exam and demonstrate no new areas of airspace consolidation or volume loss.  respiratory culture 4/19 with pseudomonas.   -  completed zosyn    Squamous cell cancer of tongue    12/15/21 FNA left neck mass : squamous cell carcinoma, p16 negative  1/4/22 total glossectomy, bilateral neck dissection, bilateral  cervical facial advancement flaps and anterolateral thigh free flap reconstruction of his glossectomy defect.  Final path :  [id number] (+microscopic SALINAS, single contralateral node), superior soft tissue margin of left neck with tumor.  2/28/22 start chemoradiation  Finished chemo with cisplatin 4/6. Was going to complete final dose of radiation tomorrow.     -Rad/onc consulted for radiation while inpatient  4/22  s/p radiation oncology eval -on adjuvant chemoradiation, completed 31/33 fractions of RT.  renitiation of RT  inpatient when patient able to tolerate.    4/23  completed radiation sessions  5/1 - weaning oxygen, takes 5 liters at home and has all supplies.     Dysphagia  Nutrition consulted. Most recent weight and BMI monitored-          Malnutrition (Moderate to Severe)  Weight Loss (Malnutrition): greater than 10% in 6 months                 Measurements:  Wt Readings from Last 1 Encounters:   04/27/22 62.4 kg (137 lb 9.1 oz)   Body mass index is 20.92 kg/m².    Recommendations: Recommendation/Intervention: 1.  Goals: Meet % EEN, EPN by RD f/u date    Patient has been screened and assessed by RD. RD will follow patient.    4/21 secondary to above. on G tube feedings   4/22 changed to bolus GT feedings.   4/29 - pt noted to sometimes refuse TFs. , on bolus TFs, no water added      Hypernatremia  4/25 sodium 150. started on D5W . repeat renal panel in PM   5/2- sodium trended down to 145 -> 140, on TFs -> 138 -> 139, not receiving extra water    Hypophosphatemia  Replaced  5/2- stable      Hypokalemia  replaced   5/2 - stable      Chronic respiratory failure with hypoxia, on home O2 therapy  secondary to COPD. on 5L oxygen  at home  5/2  - continue weaning efforts, currently on 8 liters    Protein-calorie malnutrition  Nutrition consulted. Most recent weight and BMI monitored-          Malnutrition (Moderate to Severe)  Weight Loss (Malnutrition): greater than 10% in 6 months               Measurements:  Wt Readings from Last 1 Encounters:   04/27/22 62.4 kg (137 lb 9.1 oz)   Body mass index is 20.92 kg/m².    Recommendations: Recommendation/Intervention: 1.  Goals: Meet % EEN, EPN by RD f/u date    Patient has been screened and assessed by RD. RD will follow patient.  See above      Normocytic anemia    Patient's with Normocytic anemia.. Hemoglobin stable. Etiology likely due to chronic disease .  Current CBC reviewed-    Recent Labs   Lab 04/30/22  0931 05/01/22  0544 05/02/22  0632   HGB 11.1* 9.4* 9.5*     Monitor CBC and transfuse if H/H drops below 7/21.       Other hyperlipidemia  Continue home statin         Primary hypertension  Chronic, controlled.  Latest blood pressure and vitals reviewed-   Temp:  [97 °F (36.1 °C)-98.3 °F (36.8 °C)]   Pulse:  []   Resp:  [16-25]   BP: (109-157)/(70-90)   SpO2:  [91 %-98 %] .   Home meds for hypertension were reviewed- amlodipine, losartan and metoprolol  held for now  PRN meds if BP> 180/110 mm HG  4/28 - not requiring scheduled meds      Coronary artery disease involving native coronary artery of native heart without angina pectoris  Continue ASA, plavix, and statin   -stable    Goals of care, counseling/discussion  Advance Care Planning      Does patient have Capacity? Yes  Contact: Bridgett mitchell, wife  Goals/Wishes: wants to go home, HH w Home PT  Code Status- FULL  Pain management- seems comfortable without pain  Prognosis- s/p radiation, progressing cancer, high risk for aspiration, severe malnutrition  Family discussions-  4/28, 4/29 - discussed care and condition w pt and his wife.  He can talk w trach collar but sats drop.  He wants to go home. Has all oxygen supplies at home. Pt completed zosyn.   5/2 - spoke to his wife. Reviewed his care and condition. She was able to calm him down. She is encouraging him. Will start remeron for appetite and depression. She is working hard to keep him comfortable and well-cared for.  Functional  Status - has trach and peg. recommended outpt f/u palliative care    Post acute care plan: pt would benefit from outpt referral to Palliative care, plan for HH and home.    5/1 - not able to wean oxygen.  He may need a LTAC for HF oxygen.       Sinus tachycardia        VTE Risk Mitigation (From admission, onward)         Ordered     enoxaparin injection 40 mg  Daily         04/19/22 1123     IP VTE HIGH RISK PATIENT  Once         04/19/22 1123     Place sequential compression device  Until discontinued         04/19/22 1123                Discharge Planning   NISA: 5/3/2022     Code Status: Full Code   Is the patient medically ready for discharge?: Yes    Reason for patient still in hospital (select all that apply): Pending disposition  Discharge Plan A: Long-term acute care facility (LTAC)   Discharge Delays: None known at this time      Pam Bush MD  Department of Hospital Medicine   [facility] - Telemetry Stepdown

## 2022-05-02 NOTE — PLAN OF CARE
DANIELE sent referral to [facility] per medical team due to pt is currently on 8 liters of O2.      DANIELE spoke to pt's wife, Bridgett, to discuss recommendations for LTAC due to O2 levels are at 8 liters.  DANIELE will meet with pt's wife today to communicate information.    DANIELE met with pt and pt's wife at bedside and provided an update on referrals for LTAC.  Pt and pt's wife agreeable.  DANIELE advised waiting on insurance auth.  DANIELE in contact with medical team.      4:10 PM  DANIELE spoke to Kirsten CANNON [phone] and was advised MD reviewed and would like to send clinicals to MRU for review for decision on LTAC.  DANIELE advised pt is medically ready for d/c per medical team.      5:05 PM  Information shared with medical team.  DANIELE left message for Kirsten CANNON [phone] with Dr. Bush contact information for MRU review.       05/02/22 0929   Post-Acute Status   Post-Acute Authorization Placement   Post-Acute Placement Status Referrals Sent   Discharge Delays None known at this time   Discharge Plan   Discharge Plan A Long-term acute care facility (LTAC)   Discharge Plan B Home;Home Health     Milady Thomson LMSW  PRN-  [facility]  Ext. [phone]

## 2022-05-02 NOTE — ASSESSMENT & PLAN NOTE
4/25 sodium 150. started on D5W . repeat renal panel in PM   5/2- sodium trended down to 145 -> 140, on TFs -> 138 -> 139, not receiving extra water

## 2022-05-02 NOTE — SUBJECTIVE & OBJECTIVE
Interval History: see above    Review of Systems   Constitutional:  Negative for activity change, appetite change, chills, fatigue and fever.        Trach and PEG  Generalized weakness   HENT:  Negative for congestion, facial swelling, hearing loss, nosebleeds, sore throat and trouble swallowing.         Denies pain   Eyes:  Negative for pain and redness.   Respiratory:  Negative for apnea, cough, choking, chest tightness, shortness of breath, wheezing and stridor.    Cardiovascular:  Negative for chest pain, palpitations and leg swelling.   Gastrointestinal:  Negative for abdominal distention, abdominal pain, blood in stool, constipation, diarrhea, nausea and vomiting.   Genitourinary:  Negative for difficulty urinating, dysuria, flank pain, hematuria and urgency.   Musculoskeletal:  Negative for arthralgias, back pain, gait problem, neck pain and neck stiffness.   Skin:  Negative for color change, pallor and rash.   Neurological:  Negative for dizziness, tremors, syncope, facial asymmetry, weakness, light-headedness, numbness and headaches.   Psychiatric/Behavioral:  Negative for agitation, behavioral problems, confusion, decreased concentration, hallucinations, self-injury and sleep disturbance. The patient is not nervous/anxious and is not hyperactive.    Objective:     Vital Signs (Most Recent):  Temp: 97.6 °F (36.4 °C) (05/02/22 0732)  Pulse: 90 (05/02/22 0746)  Resp: 16 (05/02/22 0746)  BP: (!) 148/80 (05/02/22 0732)  SpO2: (!) 92 % (05/02/22 0746)   Vital Signs (24h Range):  Temp:  [97 °F (36.1 °C)-98.3 °F (36.8 °C)] 97.6 °F (36.4 °C)  Pulse:  [] 90  Resp:  [16-25] 16  SpO2:  [91 %-98 %] 92 %  BP: (109-157)/(70-90) 148/80     Weight: 62.4 kg (137 lb 9.1 oz)  Body mass index is 20.92 kg/m².    Intake/Output Summary (Last 24 hours) at 5/2/2022 0840  Last data filed at 5/2/2022 0733  Gross per 24 hour   Intake 810 ml   Output 450 ml   Net 360 ml        Physical Exam  Constitutional:       General: He is  not in acute distress.     Appearance: Normal appearance. He is not ill-appearing or diaphoretic.      Comments: Frail elderly, trach and PEG   HENT:      Head: Normocephalic and atraumatic.      Comments: Thin in the face     Nose: Nose normal.      Mouth/Throat:      Mouth: Mucous membranes are moist.      Pharynx: Oropharynx is clear.   Eyes:      General: No scleral icterus.     Extraocular Movements: Extraocular movements intact.      Conjunctiva/sclera: Conjunctivae normal.      Pupils: Pupils are equal, round, and reactive to light.   Cardiovascular:      Rate and Rhythm: Normal rate and regular rhythm.      Pulses: Normal pulses.      Heart sounds: Normal heart sounds.   Pulmonary:      Effort: Pulmonary effort is normal. No respiratory distress.      Breath sounds: Normal breath sounds. No wheezing, rhonchi or rales.   Abdominal:      General: Abdomen is flat. Bowel sounds are normal. There is no distension.      Palpations: Abdomen is soft.      Tenderness: There is no abdominal tenderness. There is no right CVA tenderness or guarding.   Musculoskeletal:         General: No swelling, tenderness or deformity. Normal range of motion.      Cervical back: Normal range of motion and neck supple. No rigidity or tenderness.      Comments: atrophy   Skin:     General: Skin is warm and dry.      Coloration: Skin is not jaundiced.      Findings: No rash.   Neurological:      General: No focal deficit present.      Mental Status: He is alert and oriented to person, place, and time. Mental status is at baseline.      Motor: No weakness.   Psychiatric:         Mood and Affect: Mood normal.         Behavior: Behavior normal.         Thought Content: Thought content normal.         Judgment: Judgment normal.       Significant Labs: All pertinent labs within the past 24 hours have been reviewed.  CBC:   Recent Labs   Lab 04/30/22  0931 05/01/22  0544 05/02/22  0632   WBC 7.60 6.12 5.36   HGB 11.1* 9.4* 9.5*   HCT 35.7*  29.9* 30.0*    178 195       CMP:   Recent Labs   Lab 04/30/22  0931 05/01/22  0542 05/02/22  0632    138 139   K 4.0 3.8 4.2   CL 98 100 102   CO2 28 29 30*   * 111* 102   BUN 15 18 15   CREATININE 0.6 0.6 0.6   CALCIUM 9.4 8.9 8.8   PROT 6.4 5.7* 5.6*   ALBUMIN 2.6* 2.3* 2.4*   BILITOT 0.2 0.2 0.2   ALKPHOS 76 69 68   AST 15 13 17   ALT 13 13 12   ANIONGAP 11 9 7*   EGFRNONAA >60.0 >60.0 >60.0         Significant Imaging: I have reviewed all pertinent imaging results/findings within the past 24 hours.

## 2022-05-02 NOTE — ASSESSMENT & PLAN NOTE
Chronic, controlled.  Latest blood pressure and vitals reviewed-   Temp:  [97 °F (36.1 °C)-98.3 °F (36.8 °C)]   Pulse:  []   Resp:  [16-25]   BP: (109-157)/(70-90)   SpO2:  [91 %-98 %] .   Home meds for hypertension were reviewed- amlodipine, losartan and metoprolol  held for now  PRN meds if BP> 180/110 mm HG  4/28 - not requiring scheduled meds

## 2022-05-02 NOTE — ASSESSMENT & PLAN NOTE
Advance Care Planning      Does patient have Capacity? Yes  Contact: Bridgett mitchell, wife  Goals/Wishes: wants to go home, HH w Home PT  Code Status- FULL  Pain management- seems comfortable without pain  Prognosis- s/p radiation, progressing cancer, high risk for aspiration, severe malnutrition  Family discussions-  4/28, 4/29 - discussed care and condition w pt and his wife.  He can talk w trach collar but sats drop.  He wants to go home. Has all oxygen supplies at home. Pt completed zosyn.   5/2 - spoke to his wife. Reviewed his care and condition. She was able to calm him down. She is encouraging him. Will start remeron for appetite and depression. She is working hard to keep him comfortable and well-cared for.  Functional Status - has trach and peg. recommended outpt f/u palliative care    Post acute care plan: pt would benefit from outpt referral to Palliative care, plan for HH and home.    5/1 - not able to wean oxygen.  He may need a LTAC for HF oxygen.

## 2022-05-03 LAB
ALBUMIN SERPL BCP-MCNC: 2.4 G/DL (ref 3.5–5.2)
ALP SERPL-CCNC: 68 U/L (ref 55–135)
ALT SERPL W/O P-5'-P-CCNC: 11 U/L (ref 10–44)
ANION GAP SERPL CALC-SCNC: 7 MMOL/L (ref 8–16)
AST SERPL-CCNC: 13 U/L (ref 10–40)
BASOPHILS # BLD AUTO: 0.03 K/UL (ref 0–0.2)
BASOPHILS NFR BLD: 0.6 % (ref 0–1.9)
BILIRUB SERPL-MCNC: 0.2 MG/DL (ref 0.1–1)
BUN SERPL-MCNC: 16 MG/DL (ref 8–23)
CALCIUM SERPL-MCNC: 9.3 MG/DL (ref 8.7–10.5)
CHLORIDE SERPL-SCNC: 103 MMOL/L (ref 95–110)
CO2 SERPL-SCNC: 29 MMOL/L (ref 23–29)
CREAT SERPL-MCNC: 0.7 MG/DL (ref 0.5–1.4)
DIFFERENTIAL METHOD: ABNORMAL
EOSINOPHIL # BLD AUTO: 0.1 K/UL (ref 0–0.5)
EOSINOPHIL NFR BLD: 1.1 % (ref 0–8)
ERYTHROCYTE [DISTWIDTH] IN BLOOD BY AUTOMATED COUNT: 16.7 % (ref 11.5–14.5)
EST. GFR  (AFRICAN AMERICAN): >60 ML/MIN/1.73 M^2
EST. GFR  (NON AFRICAN AMERICAN): >60 ML/MIN/1.73 M^2
GLUCOSE SERPL-MCNC: 116 MG/DL (ref 70–110)
HCT VFR BLD AUTO: 30.3 % (ref 40–54)
HGB BLD-MCNC: 9.5 G/DL (ref 14–18)
IMM GRANULOCYTES # BLD AUTO: 0.02 K/UL (ref 0–0.04)
IMM GRANULOCYTES NFR BLD AUTO: 0.4 % (ref 0–0.5)
LYMPHOCYTES # BLD AUTO: 0.6 K/UL (ref 1–4.8)
LYMPHOCYTES NFR BLD: 13 % (ref 18–48)
MAGNESIUM SERPL-MCNC: 1.9 MG/DL (ref 1.6–2.6)
MCH RBC QN AUTO: 29.7 PG (ref 27–31)
MCHC RBC AUTO-ENTMCNC: 31.4 G/DL (ref 32–36)
MCV RBC AUTO: 95 FL (ref 82–98)
MONOCYTES # BLD AUTO: 0.6 K/UL (ref 0.3–1)
MONOCYTES NFR BLD: 12.6 % (ref 4–15)
NEUTROPHILS # BLD AUTO: 3.4 K/UL (ref 1.8–7.7)
NEUTROPHILS NFR BLD: 72.3 % (ref 38–73)
NRBC BLD-RTO: 0 /100 WBC
PHOSPHATE SERPL-MCNC: 4.2 MG/DL (ref 2.7–4.5)
PLATELET # BLD AUTO: 203 K/UL (ref 150–450)
PMV BLD AUTO: 9.7 FL (ref 9.2–12.9)
POTASSIUM SERPL-SCNC: 4 MMOL/L (ref 3.5–5.1)
PROT SERPL-MCNC: 6.1 G/DL (ref 6–8.4)
RBC # BLD AUTO: 3.2 M/UL (ref 4.6–6.2)
SODIUM SERPL-SCNC: 139 MMOL/L (ref 136–145)
WBC # BLD AUTO: 4.68 K/UL (ref 3.9–12.7)

## 2022-05-03 PROCEDURE — 83735 ASSAY OF MAGNESIUM: CPT | Performed by: HOSPITALIST

## 2022-05-03 PROCEDURE — 80053 COMPREHEN METABOLIC PANEL: CPT | Performed by: HOSPITALIST

## 2022-05-03 PROCEDURE — 27000221 HC OXYGEN, UP TO 24 HOURS

## 2022-05-03 PROCEDURE — 63600175 PHARM REV CODE 636 W HCPCS: Performed by: INTERNAL MEDICINE

## 2022-05-03 PROCEDURE — 99900026 HC AIRWAY MAINTENANCE (STAT)

## 2022-05-03 PROCEDURE — 25000003 PHARM REV CODE 250: Performed by: HOSPITALIST

## 2022-05-03 PROCEDURE — 94668 MNPJ CHEST WALL SBSQ: CPT

## 2022-05-03 PROCEDURE — 20600001 HC STEP DOWN PRIVATE ROOM

## 2022-05-03 PROCEDURE — 25000003 PHARM REV CODE 250: Performed by: STUDENT IN AN ORGANIZED HEALTH CARE EDUCATION/TRAINING PROGRAM

## 2022-05-03 PROCEDURE — 94640 AIRWAY INHALATION TREATMENT: CPT

## 2022-05-03 PROCEDURE — 84100 ASSAY OF PHOSPHORUS: CPT | Performed by: HOSPITALIST

## 2022-05-03 PROCEDURE — 99232 SBSQ HOSP IP/OBS MODERATE 35: CPT | Mod: ,,, | Performed by: HOSPITALIST

## 2022-05-03 PROCEDURE — 94761 N-INVAS EAR/PLS OXIMETRY MLT: CPT

## 2022-05-03 PROCEDURE — 99900035 HC TECH TIME PER 15 MIN (STAT)

## 2022-05-03 PROCEDURE — 99232 PR SUBSEQUENT HOSPITAL CARE,LEVL II: ICD-10-PCS | Mod: ,,, | Performed by: HOSPITALIST

## 2022-05-03 PROCEDURE — 85025 COMPLETE CBC W/AUTO DIFF WBC: CPT | Performed by: HOSPITALIST

## 2022-05-03 PROCEDURE — 25000242 PHARM REV CODE 250 ALT 637 W/ HCPCS: Performed by: INTERNAL MEDICINE

## 2022-05-03 RX ADMIN — IPRATROPIUM BROMIDE AND ALBUTEROL SULFATE 3 ML: 2.5; .5 SOLUTION RESPIRATORY (INHALATION) at 04:05

## 2022-05-03 RX ADMIN — GUAIFENESIN 200 MG: 100 SOLUTION ORAL at 09:05

## 2022-05-03 RX ADMIN — ASPIRIN 81 MG CHEWABLE TABLET 81 MG: 81 TABLET CHEWABLE at 08:05

## 2022-05-03 RX ADMIN — GLYCOPYRROLATE 2 MG: 1 LIQUID ORAL at 09:05

## 2022-05-03 RX ADMIN — IPRATROPIUM BROMIDE AND ALBUTEROL SULFATE 3 ML: 2.5; .5 SOLUTION RESPIRATORY (INHALATION) at 11:05

## 2022-05-03 RX ADMIN — ENOXAPARIN SODIUM 40 MG: 40 INJECTION SUBCUTANEOUS at 05:05

## 2022-05-03 RX ADMIN — IPRATROPIUM BROMIDE AND ALBUTEROL SULFATE 3 ML: 2.5; .5 SOLUTION RESPIRATORY (INHALATION) at 08:05

## 2022-05-03 RX ADMIN — IPRATROPIUM BROMIDE AND ALBUTEROL SULFATE 3 ML: 2.5; .5 SOLUTION RESPIRATORY (INHALATION) at 03:05

## 2022-05-03 RX ADMIN — MELATONIN TAB 3 MG 6 MG: 3 TAB at 09:05

## 2022-05-03 RX ADMIN — CLOPIDOGREL 75 MG: 75 TABLET, FILM COATED ORAL at 08:05

## 2022-05-03 RX ADMIN — FOLIC ACID 1 MG: 1 TABLET ORAL at 08:05

## 2022-05-03 RX ADMIN — GUAIFENESIN 200 MG: 100 SOLUTION ORAL at 01:05

## 2022-05-03 RX ADMIN — THIAMINE HCL TAB 100 MG 100 MG: 100 TAB at 08:05

## 2022-05-03 RX ADMIN — IPRATROPIUM BROMIDE AND ALBUTEROL SULFATE 3 ML: 2.5; .5 SOLUTION RESPIRATORY (INHALATION) at 07:05

## 2022-05-03 RX ADMIN — ATORVASTATIN CALCIUM 20 MG: 20 TABLET, FILM COATED ORAL at 08:05

## 2022-05-03 RX ADMIN — IPRATROPIUM BROMIDE AND ALBUTEROL SULFATE 3 ML: 2.5; .5 SOLUTION RESPIRATORY (INHALATION) at 12:05

## 2022-05-03 NOTE — ASSESSMENT & PLAN NOTE
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 He is on 5L with trach collar at home. He has had increased yellow secretions, work of breathing and SOB for the past several days. BNP elevated to 1600 on admission with pulmonary edema and new cardiomegaly on CXR. Trop wnl. Bicarb is elevated suggesting chronic hypercapnia. Suctioning has cleared thick mucous. He was able to be weaned down to his home O2, however after ~ 20 minutes he desats again, requiring deep suction with resolution of hypoxia. Now stable on home O2. Diuresed -3L     -Pt stable on home O2 for 24 hours (5 liters per NC), ready to stepdown to floor  - CAP coverage with ceftriaxone/azithro  - Methylprednisolone 40 mg x5 days for possible element of COPD exacerbation  - Duonebs q4  - Hypertonic saline nebs  - Chest PT  - Suction PRN    4/21 Transfer to hospital medicine . 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  initially requiring vent but has now been stable on home trach collar for 24 hours. sats 93% on 8L via trach collar.  Awaiting cultures from sputum, on CAP coverage with ceftriaxone. completed Azithromycin. continue solumedrol 40mg IV daily.   4/22 sats 95% on trach collar 10/ fio2 60% . completed azithromycin.   started on Zosyn for possible aspiration  repeat CX ray-in the inferior hemithorax on the left side consistent with airspace consolidation/volume loss in the left lower lobe is again appreciated, but the lung zones are essentially stable since the prior exam and demonstrate no new areas of airspace consolidation or volume loss.  respiratory culture 4/19 with pseudomonas.     4/30-  oxygen tapered to 8L/ Fio2 35% .  Needs to be at 5 liters to go  home. Add guaifenisen for cough and secertion. Up in chair as much as possible  5/1 - not able to wean oxygen.  He may need a LTAC for HF oxygen.   5/3 - on 8 liters NC ( home goal is 5 liters)

## 2022-05-03 NOTE — RESPIRATORY THERAPY
RAPID RESPONSE RESPIRATORY THERAPY PROACTIVE NOTE           Time of visit: 825    Code Status: Full Code   : 1950  Bed: 8094/8094 A:   MRN: [id number]  Time spent at the bedside: < 15 min    SITUATION    Evaluated patient for: LDA Check     BACKGROUND    Patient has a past medical history of Cancer, COPD (chronic obstructive pulmonary disease), Hyperlipidemia, Hypertension, and Pseudoaneurysm.  Clinically Significant Surgical Hx: tracheostomy    24 Hours Vitals Range:  Temp:  [97.7 °F (36.5 °C)-98.4 °F (36.9 °C)]   Pulse:  []   Resp:  [17-96]   BP: (103-155)/(72-86)   SpO2:  [18 %-100 %]     Labs:    Recent Labs     22  0542 22  0632 22  0457    139 139   K 3.8 4.2 4.0    102 103   CO2 29 30* 29   CREATININE 0.6 0.6 0.7   * 102 116*   PHOS 3.5 3.8 4.2   MG 2.0 1.9 1.9        No results for input(s): PH, PCO2, PO2, HCO3, POCSATURATED, BE in the last 72 hours.    ASSESSMENT/INTERVENTIONS    Upon arrival in room all trach supplies are at bedside including  A 4.0 and 6.0 shiley cuffed    Last VS   Temp: 97.7 °F (36.5 °C) (734)  Pulse: 84 (830)  Resp: 96 (830)  BP: 146/81 (734)  SpO2: 18 % (830)    Level of Consciousness: Level of Consciousness (AVPU): alert  Respiratory Effort: Respiratory Effort: Unlabored, Normal Expansion/Accessory Muscle Usage: Expansion/Accessory Muscles/Retractions: expansion symmetric, no retractions, no use of accessory muscles  All Lung Field Breath Sounds: All Lung Fields Breath Sounds: Anterior:, Lateral:, diminished  DEE Breath Sounds: diminished  LLL Breath Sounds: diminished  RUL Breath Sounds: diminished  RML Breath Sounds: diminished  RLL Breath Sounds: diminished  O2 Device/Concentration: Flow (L/min): 8, Oxygen Concentration (%): 35, O2 Device (Oxygen Therapy): Trach Collar  Surgical airway: Yes, Type: Shiley Size: 6, uncuffed  Ambu at bedside: Ambu bag with the patient?: Yes, Adult Ambu     Active  Orders   Respiratory Care    Chest physiotherapy TID     Frequency: TID     Number of Occurrences: Until Specified     Order Questions:      Indications: COPIOUS SPUTUM PRODUCTION      Indications: ATELECTASIS PROPHYLAXIS      Indications: ATELECTASIS NONRESPONSIVE TO TX    Inhalation Treatment Q4H     Frequency: Q4H     Number of Occurrences: Until Specified    Oxygen Continuous     Frequency: Continuous     Number of Occurrences: Until Specified     Order Questions:      Device type: Low flow      Device: Trach Collar (Comment: 8L)      FiO2%: 35%      Titrate O2 per Oxygen Titration Protocol: Yes      To maintain SpO2 goal of: >= 90%      Notify MD of: Inability to achieve desired SpO2; Sudden change in patient status and requires 20% increase in FiO2; Patient requires >60% FiO2    Pulse Oximetry Continuous     Frequency: Continuous     Number of Occurrences: Until Specified    Routine tracheostomy care     Frequency: BID     Number of Occurrences: Until Specified       RECOMMENDATIONS    We recommend: RRT Recs: Continue POC per primary team.    ESCALATION        FOLLOW-UP    Please call back the Rapid Response RTSu RRT at x [phone] for any questions or concerns.

## 2022-05-03 NOTE — PLAN OF CARE
DANIELE spoke to Kirsten cheek/ DAVIS [phone] and was advised case will go for further review.  ERO review in process with PHN.      DANIELE spoke to pt's spouse, Bridgett [phone], and provided an update on insurance review and Peer to Peer.  DANIELE will continue to follow and provide update.      Milady Thomson LMSW  PRN-  [facility]  Ext. [phone]

## 2022-05-03 NOTE — PT/OT/SLP PROGRESS
Physical Therapy      Patient Name:  Fareed Richard Jr.   MRN:  [id number]    Attempted to see patient for PT; however patient deferred stating he had been in the chair earlier today and did not want to mobilize at this time. Physical therapy will follow-up as able.    Beatriz Castro, PT, DPT, G[facility]  5/3/2022

## 2022-05-03 NOTE — ASSESSMENT & PLAN NOTE
Patient's with Normocytic anemia.. Hemoglobin stable. Etiology likely due to chronic disease .  Current CBC reviewed-    Recent Labs   Lab 05/01/22  0544 05/02/22  0632 05/03/22  0457   HGB 9.4* 9.5* 9.5*     Monitor CBC and transfuse if H/H drops below 7/21.

## 2022-05-03 NOTE — PROGRESS NOTES
[facility] - Telemetry [facility] Medicine  Progress Note    Patient Name: Fareed Richard Jr.  MRN: [id number]  Patient Class: IP- Inpatient   Admission Date: 4/19/2022  Length of Stay: 14 days  Attending Physician: Pam Bush MD  Primary Care Provider: Kodi Tubbs MD        Subjective:     Principal Problem:Acute on chronic respiratory failure        HPI:  Mr. 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 Per wife he has had several days of increasing lethargy, increased work of breathing and secretions. Yesterday she became particularly concerned when he became slightly less responsive and interactive. Wife also notes increased yellow thick secretions from trach site. Upon EMS arrival he was on his home 5 L by trach collar and saturating 93% with significant wheezing.  He received 1 DuoNeb by them.  He was also suctioned.     Currently patient is alert, following commands.  He is responding to yes no questions.  He denies any chest pain but does endorse shortness of breath and cough.  He denies any abdominal pain or pain elsewhere       ED course:   He was given 500ccs IVF as well as one time doses of vanc/cefepime/azithro. Labs significant for BNP of 1600, WBC 5k, K 5.6, Bicarb 35, VBG 7.26/87/25/40. Trop wnl. CXR with new cardiomegaly and pulmonary edema. Suctioned with return of copious secretions. ICU was consulted for acute hypoxemic respiratory failure and he will be admitted for further management.              Overview/Hospital Course:    Admitted to ICU, started on CAP coverage and solumedrol. Quickly weaned off vent after return of copious secretions on suction. Has been maintaining sats well on home O2. Rad/onc consulted for completion of radiology while inpatient. Pending culture sensitivity.    4/21 Transfer to hospital  medicine . 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  initially requiring vent but has now been stable on home trach collar for 24 hours. sats 93% on 8L via trach collar.  Awaiting cultures from sputum, on CAP coverage with ceftriaxone. completed Azithromycin. continue solumedrol 40mg IV daily.  rad/onc consulted this morning-he was supposed to complete radiation outpt but was admitted, attempting to set up for inpatient  4/22 changed to bolus GT feedings. s/p radiation oncology eval -on adjuvant chemoradiation, completed 31/33 fractions of RT.  renitiation of RT  inpatient when patient able to tolerate. sats 95% on trach collar 10/ fio2 60% . completed azithromycin.  started on Zosyn for possible aspiration  repeat CX ray-in the inferior hemithorax on the left side consistent with airspace consolidation/volume loss in the left lower lobe is again appreciated, but the lung zones are essentially stable since the prior exam and demonstrate no new areas of airspace consolidation or volume loss. respiratory culture 4/19 with pansensitive  pseudomonas.     4/23 intermittently refusing bolus GT feedings.stable on 10L/60% Fio2 . completed radiation sessions. off solumedrol  today. PT recs SNF  4/24 K and P replaced   4/25 sodium 150. started on D5W . repeat renal panel in PM . oxygen tapered to 8L/ Fio2 35%   4/26 K of 3.2  replaced   4/27 stable on 8L/ Fio2 35% . mucous plug remvove by suction , wants glycopyrrolate PRN due to dry mouth   Interval History:   4/28 - has trach, G tube, NPO, completed radiation treatments, 92% on 8 liters,+ stool and urinations. Weaning oxygen. Wants to go home w HH. Discussed his care and condition with his wife.    4/29 - still requiring 8 liters per NC. Sats low 90s. I lowered oxygen to 5 liters while in the room and he tolerated it.  Keep weaning.   4/30 - back up  to 8 liters per NC, refused some Bolus TFs yesterday  5/1 - not able to wean oxygen.  He may need a LTAC for HF oxygen. Otherwise, progressing,. Sat up in chair yesterday, + urinations and BM.   5/2 - on 8 liters per NC this am. 92%.  His home goal is 5 liters. Pt tends to need more at night.  Plan is to keep him overnight on 5 liters to make sure he is safe for home oxygen.  He may benefit from LTAC a few days. Accepted to LTAC, waiting on ins auth.  Needs PT/OT for deconditioning.  5/3 -  8 liters, up in chair a am, monitor % meals, on nebs q 4 hours. PT/OT, trach care and suctioning. LTAC referrals sent.       Interval History: see above    Review of Systems   Constitutional:  Negative for activity change, appetite change, chills, fatigue and fever.        Trach and PEG  Generalized weakness   HENT:  Negative for congestion, facial swelling, hearing loss, nosebleeds, sore throat and trouble swallowing.         Denies pain   Eyes:  Negative for pain and redness.   Respiratory:  Negative for apnea, cough, choking, chest tightness, shortness of breath, wheezing and stridor.    Cardiovascular:  Negative for chest pain, palpitations and leg swelling.   Gastrointestinal:  Negative for abdominal distention, abdominal pain, blood in stool, constipation, diarrhea, nausea and vomiting.   Genitourinary:  Negative for difficulty urinating, dysuria, flank pain, hematuria and urgency.   Musculoskeletal:  Negative for arthralgias, back pain, gait problem, neck pain and neck stiffness.   Skin:  Negative for color change, pallor and rash.   Neurological:  Negative for dizziness, tremors, syncope, facial asymmetry, weakness, light-headedness, numbness and headaches.   Psychiatric/Behavioral:  Negative for agitation, behavioral problems, confusion, decreased concentration, hallucinations, self-injury and sleep disturbance. The patient is not nervous/anxious and is not hyperactive.    Objective:     Vital Signs (Most Recent):  Temp:  97.7 °F (36.5 °C) (05/03/22 0734)  Pulse: 96 (05/03/22 0752)  Resp: 20 (05/03/22 0734)  BP: (!) 146/81 (05/03/22 0734)  SpO2: 95 % (05/03/22 0734)   Vital Signs (24h Range):  Temp:  [97.7 °F (36.5 °C)-98.4 °F (36.9 °C)] 97.7 °F (36.5 °C)  Pulse:  [] 96  Resp:  [17-22] 20  SpO2:  [91 %-100 %] 95 %  BP: (103-155)/(72-86) 146/81     Weight: 62.4 kg (137 lb 9.1 oz)  Body mass index is 20.92 kg/m².    Intake/Output Summary (Last 24 hours) at 5/3/2022 0814  Last data filed at 5/3/2022 0736  Gross per 24 hour   Intake 1215 ml   Output 900 ml   Net 315 ml        Physical Exam  Constitutional:       General: He is not in acute distress.     Appearance: Normal appearance. He is not ill-appearing or diaphoretic.      Comments: Frail elderly, trach and PEG   HENT:      Head: Normocephalic and atraumatic.      Comments: Thin in the face     Nose: Nose normal.      Mouth/Throat:      Mouth: Mucous membranes are moist.      Pharynx: Oropharynx is clear.   Eyes:      General: No scleral icterus.     Extraocular Movements: Extraocular movements intact.      Conjunctiva/sclera: Conjunctivae normal.      Pupils: Pupils are equal, round, and reactive to light.   Cardiovascular:      Rate and Rhythm: Normal rate and regular rhythm.      Pulses: Normal pulses.      Heart sounds: Normal heart sounds.   Pulmonary:      Effort: Pulmonary effort is normal. No respiratory distress.      Breath sounds: Normal breath sounds. No wheezing, rhonchi or rales.   Abdominal:      General: Abdomen is flat. Bowel sounds are normal. There is no distension.      Palpations: Abdomen is soft.      Tenderness: There is no abdominal tenderness. There is no right CVA tenderness or guarding.   Musculoskeletal:         General: No swelling, tenderness or deformity. Normal range of motion.      Cervical back: Normal range of motion and neck supple. No rigidity or tenderness.      Comments: atrophy   Skin:     General: Skin is warm and dry.      Coloration:  Skin is not jaundiced.      Findings: No rash.   Neurological:      General: No focal deficit present.      Mental Status: He is alert and oriented to person, place, and time. Mental status is at baseline.      Motor: No weakness.   Psychiatric:         Mood and Affect: Mood normal.         Behavior: Behavior normal.         Thought Content: Thought content normal.         Judgment: Judgment normal.       Significant Labs: All pertinent labs within the past 24 hours have been reviewed.  CBC:   Recent Labs   Lab 05/02/22  0632 05/03/22  0457   WBC 5.36 4.68   HGB 9.5* 9.5*   HCT 30.0* 30.3*    203       CMP:   Recent Labs   Lab 05/02/22  0632 05/03/22  0457    139   K 4.2 4.0    103   CO2 30* 29    116*   BUN 15 16   CREATININE 0.6 0.7   CALCIUM 8.8 9.3   PROT 5.6* 6.1   ALBUMIN 2.4* 2.4*   BILITOT 0.2 0.2   ALKPHOS 68 68   AST 17 13   ALT 12 11   ANIONGAP 7* 7*   EGFRNONAA >60.0 >60.0         Significant Imaging: I have reviewed all pertinent imaging results/findings within the past 24 hours.      Assessment/Plan:      * Acute on chronic respiratory failure  Pt with SCC of the tongue s/p total glossectomy, chemowith cisplatin, and nearing completion of radiation with trach, COPD, asthma, HTN, CAD s/p PCI presenting for acute on chronic hypoxemic respiratory failure. He is on 5L with trach collar at home. He has had increased yellow secretions, work of breathing and SOB for the past several days. BNP elevated to 1600 on admission with pulmonary edema and new cardiomegaly on CXR. Trop wnl. Bicarb is elevated suggesting chronic hypercapnia. Suctioning has cleared thick mucous. He was able to be weaned down to his home O2, however after ~ 20 minutes he desats again, requiring deep suction with resolution of hypoxia. Now stable on home O2. Diuresed -3L     -Pt stable on home O2 for 24 hours (5 liters per NC), ready to stepdown to floor  - CAP coverage with ceftriaxone/azithro  -  Methylprednisolone 40 mg x5 days for possible element of COPD exacerbation  - Duonebs q4  - Hypertonic saline nebs  - Chest PT  - Suction PRN    4/21 Transfer to hospital medicine . 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  initially requiring vent but has now been stable on home trach collar for 24 hours. sats 93% on 8L via trach collar.  Awaiting cultures from sputum, on CAP coverage with ceftriaxone. completed Azithromycin. continue solumedrol 40mg IV daily.   4/22 sats 95% on trach collar 10/ fio2 60% . completed azithromycin.   started on Zosyn for possible aspiration  repeat CX ray-in the inferior hemithorax on the left side consistent with airspace consolidation/volume loss in the left lower lobe is again appreciated, but the lung zones are essentially stable since the prior exam and demonstrate no new areas of airspace consolidation or volume loss.  respiratory culture 4/19 with pseudomonas.     4/30-  oxygen tapered to 8L/ Fio2 35% .  Needs to be at 5 liters to go home. Add guaifenisen for cough and secertion. Up in chair as much as possible  5/1 - not able to wean oxygen.  He may need a LTAC for HF oxygen.   5/3 - on 8 liters NC ( home goal is 5 liters)    Aspiration pneumonia  4/22 sats 95% on trach collar 10/ fio2 60% . completed azithromycin.   started on Zosyn for possible aspiration  repeat CX ray-in the inferior hemithorax on the left side consistent with airspace consolidation/volume loss in the left lower lobe is again appreciated, but the lung zones are essentially stable since the prior exam and demonstrate no new areas of airspace consolidation or volume loss.  respiratory culture 4/19 with pseudomonas.   -  completed zosyn    Squamous cell cancer of tongue    12/15/21 FNA left neck mass : squamous cell carcinoma, p16 negative  1/4/22 total glossectomy, bilateral neck  dissection, bilateral cervical facial advancement flaps and anterolateral thigh free flap reconstruction of his glossectomy defect.  Final path :  [id number] (+microscopic SALINAS, single contralateral node), superior soft tissue margin of left neck with tumor.  2/28/22 start chemoradiation  Finished chemo with cisplatin 4/6. Was going to complete final dose of radiation tomorrow.     -Rad/onc consulted for radiation while inpatient  4/22  s/p radiation oncology eval -on adjuvant chemoradiation, completed 31/33 fractions of RT.  renitiation of RT  inpatient when patient able to tolerate.    4/23  completed radiation sessions  5/3 -attempting to  wean oxygen, takes 5 liters at home and has all supplies.     Dysphagia  Nutrition consulted. Most recent weight and BMI monitored-          Malnutrition (Moderate to Severe)  Weight Loss (Malnutrition): greater than 10% in 6 months                 Measurements:  Wt Readings from Last 1 Encounters:   04/27/22 62.4 kg (137 lb 9.1 oz)   Body mass index is 20.92 kg/m².    Recommendations: Recommendation/Intervention: 1.  Goals: Meet % EEN, EPN by RD f/u date    Patient has been screened and assessed by RD. RD will follow patient.    4/21 secondary to above. on G tube feedings   4/22 changed to bolus GT feedings.   4/29 - pt noted to sometimes refuse TFs. , on bolus TFs, no water added      Hypernatremia  4/25 sodium 150. started on D5W . repeat renal panel in PM   5/2- sodium trended down to 145 -> 140, on TFs -> 138 -> 139, not receiving extra water    Hypophosphatemia  Replaced  5/2- stable      Hypokalemia  replaced   5/2 - stable      Chronic respiratory failure with hypoxia, on home O2 therapy  secondary to COPD. on 5L oxygen  at home  5/3  - continue weaning efforts, currently on 8 liters    Protein-calorie malnutrition  Nutrition consulted. Most recent weight and BMI monitored-          Malnutrition (Moderate to Severe)  Weight Loss (Malnutrition): greater than 10% in 6  months              Measurements:  Wt Readings from Last 1 Encounters:   04/27/22 62.4 kg (137 lb 9.1 oz)   Body mass index is 20.92 kg/m².    Recommendations: Recommendation/Intervention: 1.  Goals: Meet % EEN, EPN by RD f/u date    Patient has been screened and assessed by RD. RD will follow patient.  See above      Normocytic anemia    Patient's with Normocytic anemia.. Hemoglobin stable. Etiology likely due to chronic disease .  Current CBC reviewed-    Recent Labs   Lab 05/01/22  0544 05/02/22  0632 05/03/22  0457   HGB 9.4* 9.5* 9.5*     Monitor CBC and transfuse if H/H drops below 7/21.       Other hyperlipidemia  Continue home statin         Primary hypertension  Chronic, controlled.  Latest blood pressure and vitals reviewed-   Temp:  [97.7 °F (36.5 °C)-98.4 °F (36.9 °C)]   Pulse:  []   Resp:  [17-22]   BP: (103-155)/(72-86)   SpO2:  [91 %-100 %] .   Home meds for hypertension were reviewed- amlodipine, losartan and metoprolol  held for now  PRN meds if BP> 180/110 mm HG  4/28 - not requiring scheduled meds      Coronary artery disease involving native coronary artery of native heart without angina pectoris  Continue ASA, plavix, and statin   -stable    Goals of care, counseling/discussion  Advance Care Planning      Does patient have Capacity? Yes  Contact: Bridgett mitchell, wife  Goals/Wishes: wants to go home, HH w Home PT  Code Status- FULL  Pain management- seems comfortable without pain  Prognosis- s/p radiation, progressing cancer, high risk for aspiration, severe malnutrition  Family discussions-  4/28, 4/29 - discussed care and condition w pt and his wife.  He can talk w trach collar but sats drop.  He wants to go home. Has all oxygen supplies at home. Pt completed zosyn.   5/2 - spoke to his wife. Reviewed his care and condition. She was able to calm him down. She is encouraging him. Will start remeron for appetite and depression. She is working hard to keep him comfortable and well-cared  for.  Functional Status - has trach and peg. recommended outpt f/u palliative care    Post acute care plan: pt would benefit from outpt referral to Palliative care, plan for HH and home.    5/1 - not able to wean oxygen.  He may need a LTAC for HF oxygen.       Sinus tachycardia          VTE Risk Mitigation (From admission, onward)         Ordered     enoxaparin injection 40 mg  Daily         04/19/22 1123     IP VTE HIGH RISK PATIENT  Once         04/19/22 1123     Place sequential compression device  Until discontinued         04/19/22 1123                Discharge Planning   NISA: 5/3/2022     Code Status: Full Code   Is the patient medically ready for discharge?: Yes    Reason for patient still in hospital (select all that apply): Patient trending condition  Discharge Plan A: Long-term acute care facility (LTAC)   Discharge Delays: None known at this time      Pam Bush MD  Department of Hospital Medicine   [facility] - Telemetry Stepdown

## 2022-05-03 NOTE — PLAN OF CARE
"   Problem: Adult Inpatient Plan of Care  Goal: Plan of Care Review  Outcome: Ongoing, Progressing  Goal: Patient-Specific Goal (Individualized)  Outcome: Ongoing, Progressing  Goal: Absence of Hospital-Acquired Illness or Injury  Outcome: Ongoing, Progressing  Goal: Optimal Comfort and Wellbeing  Outcome: Ongoing, Progressing  Goal: Readiness for Transition of Care  Outcome: Ongoing, Progressing    Pt vitals stable at 5L 35% O2, keep the patient at 5L, "Tonight, try to reposition, and give him a breathing tm instead. if he is comfortable and sleeping let him go down to 85%."  Per MD, cont plan of care, all needs addressed, call light within reach.        "

## 2022-05-03 NOTE — SUBJECTIVE & OBJECTIVE
Interval History: see above    Review of Systems   Constitutional:  Negative for activity change, appetite change, chills, fatigue and fever.        Trach and PEG  Generalized weakness   HENT:  Negative for congestion, facial swelling, hearing loss, nosebleeds, sore throat and trouble swallowing.         Denies pain   Eyes:  Negative for pain and redness.   Respiratory:  Negative for apnea, cough, choking, chest tightness, shortness of breath, wheezing and stridor.    Cardiovascular:  Negative for chest pain, palpitations and leg swelling.   Gastrointestinal:  Negative for abdominal distention, abdominal pain, blood in stool, constipation, diarrhea, nausea and vomiting.   Genitourinary:  Negative for difficulty urinating, dysuria, flank pain, hematuria and urgency.   Musculoskeletal:  Negative for arthralgias, back pain, gait problem, neck pain and neck stiffness.   Skin:  Negative for color change, pallor and rash.   Neurological:  Negative for dizziness, tremors, syncope, facial asymmetry, weakness, light-headedness, numbness and headaches.   Psychiatric/Behavioral:  Negative for agitation, behavioral problems, confusion, decreased concentration, hallucinations, self-injury and sleep disturbance. The patient is not nervous/anxious and is not hyperactive.    Objective:     Vital Signs (Most Recent):  Temp: 97.7 °F (36.5 °C) (05/03/22 0734)  Pulse: 96 (05/03/22 0752)  Resp: 20 (05/03/22 0734)  BP: (!) 146/81 (05/03/22 0734)  SpO2: 95 % (05/03/22 0734)   Vital Signs (24h Range):  Temp:  [97.7 °F (36.5 °C)-98.4 °F (36.9 °C)] 97.7 °F (36.5 °C)  Pulse:  [] 96  Resp:  [17-22] 20  SpO2:  [91 %-100 %] 95 %  BP: (103-155)/(72-86) 146/81     Weight: 62.4 kg (137 lb 9.1 oz)  Body mass index is 20.92 kg/m².    Intake/Output Summary (Last 24 hours) at 5/3/2022 0814  Last data filed at 5/3/2022 0736  Gross per 24 hour   Intake 1215 ml   Output 900 ml   Net 315 ml        Physical Exam  Constitutional:       General: He is  not in acute distress.     Appearance: Normal appearance. He is not ill-appearing or diaphoretic.      Comments: Frail elderly, trach and PEG   HENT:      Head: Normocephalic and atraumatic.      Comments: Thin in the face     Nose: Nose normal.      Mouth/Throat:      Mouth: Mucous membranes are moist.      Pharynx: Oropharynx is clear.   Eyes:      General: No scleral icterus.     Extraocular Movements: Extraocular movements intact.      Conjunctiva/sclera: Conjunctivae normal.      Pupils: Pupils are equal, round, and reactive to light.   Cardiovascular:      Rate and Rhythm: Normal rate and regular rhythm.      Pulses: Normal pulses.      Heart sounds: Normal heart sounds.   Pulmonary:      Effort: Pulmonary effort is normal. No respiratory distress.      Breath sounds: Normal breath sounds. No wheezing, rhonchi or rales.   Abdominal:      General: Abdomen is flat. Bowel sounds are normal. There is no distension.      Palpations: Abdomen is soft.      Tenderness: There is no abdominal tenderness. There is no right CVA tenderness or guarding.   Musculoskeletal:         General: No swelling, tenderness or deformity. Normal range of motion.      Cervical back: Normal range of motion and neck supple. No rigidity or tenderness.      Comments: atrophy   Skin:     General: Skin is warm and dry.      Coloration: Skin is not jaundiced.      Findings: No rash.   Neurological:      General: No focal deficit present.      Mental Status: He is alert and oriented to person, place, and time. Mental status is at baseline.      Motor: No weakness.   Psychiatric:         Mood and Affect: Mood normal.         Behavior: Behavior normal.         Thought Content: Thought content normal.         Judgment: Judgment normal.       Significant Labs: All pertinent labs within the past 24 hours have been reviewed.  CBC:   Recent Labs   Lab 05/02/22  0632 05/03/22  0457   WBC 5.36 4.68   HGB 9.5* 9.5*   HCT 30.0* 30.3*    203        CMP:   Recent Labs   Lab 05/02/22  0632 05/03/22  0457    139   K 4.2 4.0    103   CO2 30* 29    116*   BUN 15 16   CREATININE 0.6 0.7   CALCIUM 8.8 9.3   PROT 5.6* 6.1   ALBUMIN 2.4* 2.4*   BILITOT 0.2 0.2   ALKPHOS 68 68   AST 17 13   ALT 12 11   ANIONGAP 7* 7*   EGFRNONAA >60.0 >60.0         Significant Imaging: I have reviewed all pertinent imaging results/findings within the past 24 hours.

## 2022-05-03 NOTE — PLAN OF CARE
Problem: Adult Inpatient Plan of Care  Goal: Plan of Care Review  5/3/2022 1739 by Sergio Anne RN  Outcome: Ongoing, Progressing  5/3/2022 1727 by Sergio Anne RN  Outcome: Ongoing, Progressing  Goal: Patient-Specific Goal (Individualized)  5/3/2022 1739 by Sergio Anne RN  Outcome: Ongoing, Progressing  5/3/2022 1727 by Sergio Anne RN  Outcome: Ongoing, Progressing  Goal: Absence of Hospital-Acquired Illness or Injury  5/3/2022 1739 by Sergio Anne RN  Outcome: Ongoing, Progressing  5/3/2022 1727 by Sergio Anne RN  Outcome: Ongoing, Progressing  Goal: Optimal Comfort and Wellbeing  5/3/2022 1739 by Sergio Anne RN  Outcome: Ongoing, Progressing  5/3/2022 1727 by Sergio Anne RN  Outcome: Ongoing, Progressing  Goal: Readiness for Transition of Care  5/3/2022 1739 by Sergio Anne RN  Outcome: Ongoing, Progressing  5/3/2022 1727 by Sergio Anne RN  Outcome: Ongoing, Progressing

## 2022-05-03 NOTE — NURSING
2000  Received report for pt from AM Nurse. Pt resting in bed. AAOx4. Trach; 8L 35% FIO2. VSS. Tele box in place. No c/o pain/distress. Education regarding fall and safety precaution provided. Safety check performed. Call bell within reach. Will continue to monitor.    0000  Pt resting in bed. Medication administered per MAR. Safety check performed. Call bell within reach. Will continue to monitor.

## 2022-05-03 NOTE — ASSESSMENT & PLAN NOTE
Chronic, controlled.  Latest blood pressure and vitals reviewed-   Temp:  [97.7 °F (36.5 °C)-98.4 °F (36.9 °C)]   Pulse:  []   Resp:  [17-22]   BP: (103-155)/(72-86)   SpO2:  [91 %-100 %] .   Home meds for hypertension were reviewed- amlodipine, losartan and metoprolol  held for now  PRN meds if BP> 180/110 mm HG  4/28 - not requiring scheduled meds

## 2022-05-04 LAB
ALBUMIN SERPL BCP-MCNC: 2.5 G/DL (ref 3.5–5.2)
ALP SERPL-CCNC: 70 U/L (ref 55–135)
ALT SERPL W/O P-5'-P-CCNC: 11 U/L (ref 10–44)
ANION GAP SERPL CALC-SCNC: 9 MMOL/L (ref 8–16)
AST SERPL-CCNC: 13 U/L (ref 10–40)
BASOPHILS # BLD AUTO: 0.02 K/UL (ref 0–0.2)
BASOPHILS NFR BLD: 0.4 % (ref 0–1.9)
BILIRUB SERPL-MCNC: 0.2 MG/DL (ref 0.1–1)
BUN SERPL-MCNC: 19 MG/DL (ref 8–23)
CALCIUM SERPL-MCNC: 9.4 MG/DL (ref 8.7–10.5)
CHLORIDE SERPL-SCNC: 103 MMOL/L (ref 95–110)
CO2 SERPL-SCNC: 29 MMOL/L (ref 23–29)
CREAT SERPL-MCNC: 0.6 MG/DL (ref 0.5–1.4)
DIFFERENTIAL METHOD: ABNORMAL
EOSINOPHIL # BLD AUTO: 0.1 K/UL (ref 0–0.5)
EOSINOPHIL NFR BLD: 1 % (ref 0–8)
ERYTHROCYTE [DISTWIDTH] IN BLOOD BY AUTOMATED COUNT: 16.5 % (ref 11.5–14.5)
EST. GFR  (AFRICAN AMERICAN): >60 ML/MIN/1.73 M^2
EST. GFR  (NON AFRICAN AMERICAN): >60 ML/MIN/1.73 M^2
GLUCOSE SERPL-MCNC: 119 MG/DL (ref 70–110)
HCT VFR BLD AUTO: 30.6 % (ref 40–54)
HGB BLD-MCNC: 9.4 G/DL (ref 14–18)
IMM GRANULOCYTES # BLD AUTO: 0.03 K/UL (ref 0–0.04)
IMM GRANULOCYTES NFR BLD AUTO: 0.6 % (ref 0–0.5)
LYMPHOCYTES # BLD AUTO: 0.6 K/UL (ref 1–4.8)
LYMPHOCYTES NFR BLD: 11 % (ref 18–48)
MAGNESIUM SERPL-MCNC: 2.1 MG/DL (ref 1.6–2.6)
MCH RBC QN AUTO: 29.3 PG (ref 27–31)
MCHC RBC AUTO-ENTMCNC: 30.7 G/DL (ref 32–36)
MCV RBC AUTO: 95 FL (ref 82–98)
MONOCYTES # BLD AUTO: 0.6 K/UL (ref 0.3–1)
MONOCYTES NFR BLD: 11.6 % (ref 4–15)
NEUTROPHILS # BLD AUTO: 3.8 K/UL (ref 1.8–7.7)
NEUTROPHILS NFR BLD: 75.4 % (ref 38–73)
NRBC BLD-RTO: 0 /100 WBC
PHOSPHATE SERPL-MCNC: 4.2 MG/DL (ref 2.7–4.5)
PLATELET # BLD AUTO: 214 K/UL (ref 150–450)
PMV BLD AUTO: 9.6 FL (ref 9.2–12.9)
POTASSIUM SERPL-SCNC: 4 MMOL/L (ref 3.5–5.1)
PROT SERPL-MCNC: 6.2 G/DL (ref 6–8.4)
RBC # BLD AUTO: 3.21 M/UL (ref 4.6–6.2)
SODIUM SERPL-SCNC: 141 MMOL/L (ref 136–145)
WBC # BLD AUTO: 5 K/UL (ref 3.9–12.7)

## 2022-05-04 PROCEDURE — 85025 COMPLETE CBC W/AUTO DIFF WBC: CPT | Performed by: HOSPITALIST

## 2022-05-04 PROCEDURE — 94668 MNPJ CHEST WALL SBSQ: CPT

## 2022-05-04 PROCEDURE — 27000221 HC OXYGEN, UP TO 24 HOURS

## 2022-05-04 PROCEDURE — 27200966 HC CLOSED SUCTION SYSTEM

## 2022-05-04 PROCEDURE — 94761 N-INVAS EAR/PLS OXIMETRY MLT: CPT

## 2022-05-04 PROCEDURE — 63600175 PHARM REV CODE 636 W HCPCS: Performed by: HOSPITALIST

## 2022-05-04 PROCEDURE — 25000003 PHARM REV CODE 250: Performed by: STUDENT IN AN ORGANIZED HEALTH CARE EDUCATION/TRAINING PROGRAM

## 2022-05-04 PROCEDURE — 94640 AIRWAY INHALATION TREATMENT: CPT

## 2022-05-04 PROCEDURE — 36415 COLL VENOUS BLD VENIPUNCTURE: CPT | Performed by: HOSPITALIST

## 2022-05-04 PROCEDURE — 99900026 HC AIRWAY MAINTENANCE (STAT)

## 2022-05-04 PROCEDURE — 97110 THERAPEUTIC EXERCISES: CPT

## 2022-05-04 PROCEDURE — 25000242 PHARM REV CODE 250 ALT 637 W/ HCPCS: Performed by: INTERNAL MEDICINE

## 2022-05-04 PROCEDURE — 84100 ASSAY OF PHOSPHORUS: CPT | Performed by: HOSPITALIST

## 2022-05-04 PROCEDURE — 20600001 HC STEP DOWN PRIVATE ROOM

## 2022-05-04 PROCEDURE — 83735 ASSAY OF MAGNESIUM: CPT | Performed by: HOSPITALIST

## 2022-05-04 PROCEDURE — 99900035 HC TECH TIME PER 15 MIN (STAT)

## 2022-05-04 PROCEDURE — 25000242 PHARM REV CODE 250 ALT 637 W/ HCPCS: Performed by: STUDENT IN AN ORGANIZED HEALTH CARE EDUCATION/TRAINING PROGRAM

## 2022-05-04 PROCEDURE — 99233 PR SUBSEQUENT HOSPITAL CARE,LEVL III: ICD-10-PCS | Mod: ,,, | Performed by: HOSPITALIST

## 2022-05-04 PROCEDURE — 80053 COMPREHEN METABOLIC PANEL: CPT | Performed by: HOSPITALIST

## 2022-05-04 PROCEDURE — 63600175 PHARM REV CODE 636 W HCPCS: Performed by: INTERNAL MEDICINE

## 2022-05-04 PROCEDURE — 25000003 PHARM REV CODE 250: Performed by: HOSPITALIST

## 2022-05-04 PROCEDURE — 99233 SBSQ HOSP IP/OBS HIGH 50: CPT | Mod: ,,, | Performed by: HOSPITALIST

## 2022-05-04 RX ORDER — VANCOMYCIN HCL IN 5 % DEXTROSE 1G/250ML
1000 PLASTIC BAG, INJECTION (ML) INTRAVENOUS
Status: DISCONTINUED | OUTPATIENT
Start: 2022-05-05 | End: 2022-05-06

## 2022-05-04 RX ORDER — VANCOMYCIN HCL IN 5 % DEXTROSE 1G/250ML
1000 PLASTIC BAG, INJECTION (ML) INTRAVENOUS
Status: DISCONTINUED | OUTPATIENT
Start: 2022-05-05 | End: 2022-05-04

## 2022-05-04 RX ADMIN — IPRATROPIUM BROMIDE AND ALBUTEROL SULFATE 3 ML: 2.5; .5 SOLUTION RESPIRATORY (INHALATION) at 04:05

## 2022-05-04 RX ADMIN — OXYCODONE HYDROCHLORIDE 5 MG: 5 SOLUTION ORAL at 02:05

## 2022-05-04 RX ADMIN — IPRATROPIUM BROMIDE AND ALBUTEROL SULFATE 3 ML: 2.5; .5 SOLUTION RESPIRATORY (INHALATION) at 09:05

## 2022-05-04 RX ADMIN — CLOPIDOGREL 75 MG: 75 TABLET, FILM COATED ORAL at 09:05

## 2022-05-04 RX ADMIN — IPRATROPIUM BROMIDE AND ALBUTEROL SULFATE 3 ML: 2.5; .5 SOLUTION RESPIRATORY (INHALATION) at 08:05

## 2022-05-04 RX ADMIN — IPRATROPIUM BROMIDE AND ALBUTEROL SULFATE 3 ML: 2.5; .5 SOLUTION RESPIRATORY (INHALATION) at 03:05

## 2022-05-04 RX ADMIN — ENOXAPARIN SODIUM 40 MG: 40 INJECTION SUBCUTANEOUS at 05:05

## 2022-05-04 RX ADMIN — FOLIC ACID 1 MG: 1 TABLET ORAL at 09:05

## 2022-05-04 RX ADMIN — GUAIFENESIN 200 MG: 100 SOLUTION ORAL at 06:05

## 2022-05-04 RX ADMIN — THIAMINE HCL TAB 100 MG 100 MG: 100 TAB at 09:05

## 2022-05-04 RX ADMIN — ASPIRIN 81 MG CHEWABLE TABLET 81 MG: 81 TABLET CHEWABLE at 09:05

## 2022-05-04 RX ADMIN — IPRATROPIUM BROMIDE AND ALBUTEROL SULFATE 3 ML: 2.5; .5 SOLUTION RESPIRATORY (INHALATION) at 12:05

## 2022-05-04 RX ADMIN — GUAIFENESIN 200 MG: 100 SOLUTION ORAL at 03:05

## 2022-05-04 RX ADMIN — ATORVASTATIN CALCIUM 20 MG: 20 TABLET, FILM COATED ORAL at 09:05

## 2022-05-04 RX ADMIN — OXYCODONE HYDROCHLORIDE 5 MG: 5 SOLUTION ORAL at 09:05

## 2022-05-04 RX ADMIN — PIPERACILLIN SODIUM AND TAZOBACTAM SODIUM 4.5 G: 4; .5 INJECTION, POWDER, LYOPHILIZED, FOR SOLUTION INTRAVENOUS at 03:05

## 2022-05-04 RX ADMIN — SCOPALAMINE 1 PATCH: 1 PATCH, EXTENDED RELEASE TRANSDERMAL at 03:05

## 2022-05-04 RX ADMIN — PIPERACILLIN SODIUM AND TAZOBACTAM SODIUM 4.5 G: 4; .5 INJECTION, POWDER, LYOPHILIZED, FOR SOLUTION INTRAVENOUS at 10:05

## 2022-05-04 RX ADMIN — MELATONIN TAB 3 MG 6 MG: 3 TAB at 09:05

## 2022-05-04 RX ADMIN — VANCOMYCIN HYDROCHLORIDE 1500 MG: 1.5 INJECTION, POWDER, LYOPHILIZED, FOR SOLUTION INTRAVENOUS at 01:05

## 2022-05-04 RX ADMIN — POLYETHYLENE GLYCOL 3350 17 G: 17 POWDER, FOR SOLUTION ORAL at 09:05

## 2022-05-04 NOTE — NURSING
2000  Received report for pt from AM Nurse. Pt resting in bed. AAOx4. Trach; 5L 35% FIO2. VSS. No c/o pain/distress. Education regarding fall and safety precaution provided. Safety check performed. Call bell within reach. Will continue to monitor.     0000  Pt resting in bed. Medication administered per MAR. Safety check performed. Call bell within reach. Will continue to monitor.

## 2022-05-04 NOTE — PROGRESS NOTES
[facility] - Telemetry Stepdown  Adult Nutrition  Consult Note    SUMMARY     Recommendations    1. Continue current TF regimen of Isosource 1.5 (bolus) 6 cartons/day - meeting needs.  2. RD to monitor & follow-up.    Goals: Meet % EEN, EPN by RD f/u date  Nutrition Goal Status: goal met  Communication of RD Recs: reviewed with RN    Assessment and Plan    Protein-calorie malnutrition    Nutrition Problem:  Severe Protein-Calorie Malnutrition  Malnutrition in the context of Chronic Illness/Injury    Related to (etiology):  Inability to consume sufficient energy    Signs and Symptoms (as evidenced by):  Body Fat Depletion: severe depletion of orbitals   Muscle Mass Depletion: severe depletion of temples, clavicle region and lower extremities   Weight Loss: 15% x 6 months    Interventions(treatment strategy):  Collaboration of nutrition care w/ other providers  Enteral nutrition     Nutrition Diagnosis Status:  Continues    Malnutrition Assessment    Weight Loss (Malnutrition): greater than 10% in 6 months   Orbital Region (Subcutaneous Fat Loss): severe depletion   [place] Region (Muscle Loss): severe depletion  Clavicle Bone Region (Muscle Loss): severe depletion  Dorsal Hand (Muscle Loss): severe depletion  Anterior Thigh Region (Muscle Loss): severe depletion     Reason for Assessment    Reason For Assessment: RD follow-up  Diagnosis: other (see comments) (Resp. fx)  Relevant Medical History: SCC of tongue, total glossectomy, trach, PEG  Interdisciplinary Rounds: attended    General Information Comments: Remains NPO, tolerating bolus TFs. UBW: 160#; chart review confirms. NFPE complete 4/20 - pt w/ severe fat & muscle wasting. RD feels pt meets criteria for severe malnutrition. Please see PES statement for details.  Nutrition Discharge Planning: Pending clinical course    Nutrition/Diet History    Patient Reported Diet/Restrictions/Preferences: no oral intake  Spiritual, Cultural Beliefs, Sabianist Practices,  "Values that Affect Care: no  Factors Affecting Nutritional Intake: NPO  Nutrition Support Formula Prior to Admit: Isosource 1.5  Nutrition Support Rate Prior to Admit: 6 cans/day (bolus)    Anthropometrics    Temp: 97.5 °F (36.4 °C)  Height: 5' 8" (172.7 cm)  Height (inches): 68 in  Weight Method: Bed Scale  Weight: 62.4 kg (137 lb 9.1 oz)  Weight (lb): 137.57 lb  Ideal Body Weight (IBW), Male: 154 lb  % Ideal Body Weight, Male (lb): 89.33 %  BMI (Calculated): 20.9  BMI Grade: 18.5-24.9 - normal  Usual Body Weight (UBW), k.7 kg  % Usual Body Weight: 85.32  % Weight Change From Usual Weight: -14.86 %    Lab/Procedures/Meds    Pertinent Labs Reviewed: reviewed  Pertinent Labs Comments: -  Pertinent Medications Reviewed: reviewed  Pertinent Medications Comments: -    Estimated/Assessed Needs    Weight Used For Calorie Calculations: 62.4 kg (137 lb 9.1 oz)     Energy Calorie Requirements (kcal): 1426-4869 kcal/d  Energy Need Method: Kcal/kg (30-35 kcal/kg)     Protein Requirements: 94 g/d (1.5 g/kg)  Weight Used For Protein Calculations: 62.4 kg (137 lb 9.1 oz)     Estimated Fluid Requirement Method: other (see comments) (Per MD or 1 mL/kcal)  RDA Method (mL): 1872    Nutrition Prescription Ordered    Current Diet Order: NPO  Current Nutrition Support Formula Ordered: Isosource 1.5  Current Nutrition Support Rate Ordered: 6 cartons/day    Evaluation of Received Nutrient/Fluid Intake    Enteral Calories (kcal): 2250  Enteral Protein (gm): 102  Enteral (Free Water) Fluid (mL): 1146    % Kcal Needs: 103%  % Protein Needs: 109%    I/O: +8.5L since     Energy Calories Required: meeting needs  Protein Required: meeting needs  Fluid Required: other (see comments) (Per MD)    Comments: LBM:     Tolerance: tolerating    Nutrition Risk    Level of Risk/Frequency of Follow-up:  (1x/week)     Monitor and Evaluation    Food and Nutrient Intake: enteral nutrition intake  Food and Nutrient Adminstration: enteral and " parenteral nutrition administration  Physical Activity and Function: nutrition-related ADLs and IADLs  Anthropometric Measurements: weight, weight change  Biochemical Data, Medical Tests and Procedures: glucose/endocrine profile, lipid profile, inflammatory profile, gastrointestinal profile, electrolyte and renal panel  Nutrition-Focused Physical Findings: overall appearance     Nutrition Follow-Up    RD Follow-up?: Yes

## 2022-05-04 NOTE — SUBJECTIVE & OBJECTIVE
Interval History: see above    Review of Systems   Constitutional:  Negative for activity change, appetite change, chills, fatigue and fever.        Trach and PEG  Generalized weakness   HENT:  Negative for congestion, facial swelling, hearing loss, nosebleeds, sore throat and trouble swallowing.         Denies pain   Eyes:  Negative for pain and redness.   Respiratory:  Negative for apnea, cough, choking, chest tightness, shortness of breath, wheezing and stridor.    Cardiovascular:  Negative for chest pain, palpitations and leg swelling.   Gastrointestinal:  Negative for abdominal distention, abdominal pain, blood in stool, constipation, diarrhea, nausea and vomiting.   Genitourinary:  Negative for difficulty urinating, dysuria, flank pain, hematuria and urgency.   Musculoskeletal:  Negative for arthralgias, back pain, gait problem, neck pain and neck stiffness.   Skin:  Negative for color change, pallor and rash.   Neurological:  Negative for dizziness, tremors, syncope, facial asymmetry, weakness, light-headedness, numbness and headaches.   Psychiatric/Behavioral:  Negative for agitation, behavioral problems, confusion, decreased concentration, hallucinations, self-injury and sleep disturbance. The patient is not nervous/anxious and is not hyperactive.    Objective:     Vital Signs (Most Recent):  Temp: 97.5 °F (36.4 °C) (05/04/22 0802)  Pulse: 89 (05/04/22 0717)  Resp: 18 (05/04/22 0802)  BP: (!) 142/83 (05/04/22 0421)  SpO2: (!) 94 % (05/04/22 0717)   Vital Signs (24h Range):  Temp:  [97.5 °F (36.4 °C)-99 °F (37.2 °C)] 97.5 °F (36.4 °C)  Pulse:  [] 89  Resp:  [18-92] 18  SpO2:  [90 %-97 %] 94 %  BP: (132-149)/(70-83) 142/83     Weight: 62.4 kg (137 lb 9.1 oz)  Body mass index is 20.92 kg/m².    Intake/Output Summary (Last 24 hours) at 5/4/2022 0803  Last data filed at 5/4/2022 0226  Gross per 24 hour   Intake 1275 ml   Output 175 ml   Net 1100 ml        Physical Exam  Constitutional:       General: He is  not in acute distress.     Appearance: Normal appearance. He is not ill-appearing or diaphoretic.      Comments: Frail elderly, trach and PEG   HENT:      Head: Normocephalic and atraumatic.      Comments: Thin in the face     Nose: Nose normal.      Mouth/Throat:      Mouth: Mucous membranes are moist.      Pharynx: Oropharynx is clear.   Eyes:      General: No scleral icterus.     Extraocular Movements: Extraocular movements intact.      Conjunctiva/sclera: Conjunctivae normal.      Pupils: Pupils are equal, round, and reactive to light.   Cardiovascular:      Rate and Rhythm: Normal rate and regular rhythm.      Pulses: Normal pulses.      Heart sounds: Normal heart sounds.   Pulmonary:      Effort: Pulmonary effort is normal. No respiratory distress.      Breath sounds: Rales (LEFT LUNG BASE) present. No wheezing or rhonchi.   Abdominal:      General: Abdomen is flat. Bowel sounds are normal. There is no distension.      Palpations: Abdomen is soft.      Tenderness: There is no abdominal tenderness. There is no right CVA tenderness or guarding.   Musculoskeletal:         General: No swelling, tenderness or deformity. Normal range of motion.      Cervical back: Normal range of motion and neck supple. No rigidity or tenderness.      Comments: atrophy   Skin:     General: Skin is warm and dry.      Coloration: Skin is not jaundiced.      Findings: No rash.   Neurological:      General: No focal deficit present.      Mental Status: He is alert and oriented to person, place, and time. Mental status is at baseline.      Motor: No weakness.   Psychiatric:         Mood and Affect: Mood normal.         Behavior: Behavior normal.         Thought Content: Thought content normal.         Judgment: Judgment normal.       Significant Labs: All pertinent labs within the past 24 hours have been reviewed.  CBC:   Recent Labs   Lab 05/03/22  0457 05/04/22  0500   WBC 4.68 5.00   HGB 9.5* 9.4*   HCT 30.3* 30.6*    214        CMP:   Recent Labs   Lab 05/03/22  0457 05/04/22  0500    141   K 4.0 4.0    103   CO2 29 29   * 119*   BUN 16 19   CREATININE 0.7 0.6   CALCIUM 9.3 9.4   PROT 6.1 6.2   ALBUMIN 2.4* 2.5*   BILITOT 0.2 0.2   ALKPHOS 68 70   AST 13 13   ALT 11 11   ANIONGAP 7* 9   EGFRNONAA >60.0 >60.0         Significant Imaging: I have reviewed all pertinent imaging results/findings within the past 24 hours.

## 2022-05-04 NOTE — PLAN OF CARE
Problem: Adult Inpatient Plan of Care  Goal: Plan of Care Review  Outcome: Ongoing, Progressing  Goal: Optimal Comfort and Wellbeing  Outcome: Ongoing, Progressing  Goal: Readiness for Transition of Care  Outcome: Ongoing, Progressing     Problem: Skin Injury Risk Increased  Goal: Skin Health and Integrity  Outcome: Ongoing, Progressing     Problem: Skin and Tissue Injury (Artificial Airway)  Goal: Absence of Device-Related Skin or Tissue Injury  Outcome: Ongoing, Progressing       AAOx4, unable to speak r/t trach, but nods head appropriately, and communicates by writing on notepad at bedside.  Up to chair with assist x 1 during shift.  CXR completed this AM.  Denies pain. Tolerating tube feeding with no residual noted.  Family at bedside this PM. Will continue to monitor. Side rails up x 2, call light in reach, bed low and locked.

## 2022-05-04 NOTE — PROGRESS NOTES
Pharmacokinetic Initial Assessment: IV Vancomycin    Assessment/Plan:    - Vancomycin 1500 mg x1 followed by vancomycin 1000 mg Q12h   - Goal trough 15-20 mcg/mL  - Stable renal function, Scr 0.6 mg/dL  - Obtain trough on 5/6 @ 1230  Pharmacy will continue to follow and monitor vancomycin.      Please contact pharmacy at extension [phone] with any questions regarding this assessment.     Thank you for the consult,   Julia Yeondam Roh       Patient brief summary:  Fareed Richard Jr. is a 72 y.o. male initiated on antimicrobial therapy with IV Vancomycin for treatment of suspected lower respiratory infection    Drug Allergies:   Review of patient's allergies indicates:  No Known Allergies    Actual Body Weight:   62.4 kg     Renal Function:   Estimated Creatinine Clearance: 96.8 mL/min (based on SCr of 0.6 mg/dL).,     CBC (last 72 hours):  Recent Labs   Lab Result Units 05/02/22  0632 05/03/22  0457 05/04/22  0500   WBC K/uL 5.36 4.68 5.00   Hemoglobin g/dL 9.5* 9.5* 9.4*   Hematocrit % 30.0* 30.3* 30.6*   Platelets K/uL 195 203 214   Gran % % 77.1* 72.3 75.4*   Lymph % % 9.3* 13.0* 11.0*   Mono % % 11.9 12.6 11.6   Eosinophil % % 0.9 1.1 1.0   Basophil % % 0.4 0.6 0.4   Differential Method  Automated Automated Automated       Metabolic Panel (last 72 hours):  Recent Labs   Lab Result Units 05/02/22  0632 05/03/22  0457 05/04/22  0500   Sodium mmol/L 139 139 141   Potassium mmol/L 4.2 4.0 4.0   Chloride mmol/L 102 103 103   CO2 mmol/L 30* 29 29   Glucose mg/dL 102 116* 119*   BUN mg/dL 15 16 19   Creatinine mg/dL 0.6 0.7 0.6   Albumin g/dL 2.4* 2.4* 2.5*   Total Bilirubin mg/dL 0.2 0.2 0.2   Alkaline Phosphatase U/L 68 68 70   AST U/L 17 13 13   ALT U/L 12 11 11   Magnesium mg/dL 1.9 1.9 2.1   Phosphorus mg/dL 3.8 4.2 4.2       Drug levels (last 3 results):  No results for input(s): VANCOMYCINRA, VANCOMYCINPE, VANCOMYCINTR in the last 72 hours.    Microbiologic Results:  Microbiology Results (last 7 days)     ** No  results found for the last 168 hours. **

## 2022-05-04 NOTE — ASSESSMENT & PLAN NOTE
Chronic, controlled.  Latest blood pressure and vitals reviewed-   Temp:  [97.5 °F (36.4 °C)-99 °F (37.2 °C)]   Pulse:  []   Resp:  [18-92]   BP: (132-149)/(70-83)   SpO2:  [90 %-97 %] .   Home meds for hypertension were reviewed- amlodipine, losartan and metoprolol  held for now  PRN meds if BP> 180/110 mm HG  5/4 - not requiring scheduled meds

## 2022-05-04 NOTE — PLAN OF CARE
DANIELE telephoned Kirsten munir CANNON [phone] and left a message requesting a return call to discuss denial for LTAC services.      1:38 PM  SW spoke to Kirsten amaral DAVIS [phone].  Denial letter sent.  DANIELE spoke to pt's spouse, Bridgett [phone], and forward denial letter for LTAC via email ([email or web address]).  DANIELE advised pt's family she has the option of requesting a fast appeal. DANIELE notified medical team of above information.      Milady Thomson LMSW  PRN-  [facility]  Ext. [phone]

## 2022-05-04 NOTE — RESPIRATORY THERAPY
RAPID RESPONSE RESPIRATORY THERAPY PROACTIVE NOTE           Time of visit: 1556     Code Status: Full Code   : 1950  Bed: 8094/8094 A:   MRN: [id number]  Time spent at the bedside: < 15 min    SITUATION    Evaluated patient for: LDA Check     BACKGROUND    Patient has a past medical history of Cancer, COPD (chronic obstructive pulmonary disease), Hyperlipidemia, Hypertension, and Pseudoaneurysm.  Clinically Significant Surgical Hx: tracheostomy    24 Hours Vitals Range:  Temp:  [97.3 °F (36.3 °C)-99 °F (37.2 °C)]   Pulse:  []   Resp:  [18-92]   BP: (125-158)/(69-83)   SpO2:  [90 %-96 %]     Labs:    Recent Labs     22  0632 22  0457 22  0500    139 141   K 4.2 4.0 4.0    103 103   CO2 30* 29 29   CREATININE 0.6 0.7 0.6    116* 119*   PHOS 3.8 4.2 4.2   MG 1.9 1.9 2.1        No results for input(s): PH, PCO2, PO2, HCO3, POCSATURATED, BE in the last 72 hours.    ASSESSMENT/INTERVENTIONS    Upon arrival in room patient sitting in chair, resting comfortably. All supplies in room. Extra trachs size 4 uncuffed and 2 size 6 cuffed trachs at bedside.    Last VS   Temp: 97.3 °F (36.3 °C) ( 1520)  Pulse: 100 ( 1637)  Resp: 22 ( 1637)  BP: 132/69 ( 1520)  SpO2: 93 % ( 1637)    Level of Consciousness: Level of Consciousness (AVPU): alert  Respiratory Effort: Respiratory Effort: Normal, Unlabored Expansion/Accessory Muscle Usage: Expansion/Accessory Muscles/Retractions: expansion symmetric, no retractions, no use of accessory muscles  All Lung Field Breath Sounds: All Lung Fields Breath Sounds: Anterior:, Lateral:, coarse, diminished  DEE Breath Sounds: coarse, diminished  LLL Breath Sounds: diminished  RUL Breath Sounds: coarse, diminished  RML Breath Sounds: diminished  RLL Breath Sounds: diminished  O2 Device/Concentration: Flow (L/min): 5, Oxygen Concentration (%): 35, O2 Device (Oxygen Therapy): Trach Collar  Surgical airway: Yes, Type: Shiley Size: 6,  uncuffed  Ambu at bedside: Ambu bag with the patient?: Yes, Adult Ambu     Active Orders   Respiratory Care    Chest physiotherapy TID     Frequency: TID     Number of Occurrences: Until Specified     Order Questions:      Indications: COPIOUS SPUTUM PRODUCTION      Indications: ATELECTASIS PROPHYLAXIS      Indications: ATELECTASIS NONRESPONSIVE TO TX    Inhalation Treatment Q4H     Frequency: Q4H     Number of Occurrences: Until Specified    Oxygen Continuous     Frequency: Continuous     Number of Occurrences: Until Specified     Order Questions:      Device type: Low flow      Device: Trach Collar (Comment: 8L)      FiO2%: 35%      Titrate O2 per Oxygen Titration Protocol: Yes      To maintain SpO2 goal of: >= 90%      Notify MD of: Inability to achieve desired SpO2; Sudden change in patient status and requires 20% increase in FiO2; Patient requires >60% FiO2    Pulse Oximetry Continuous     Frequency: Continuous     Number of Occurrences: Until Specified    Routine tracheostomy care     Frequency: BID     Number of Occurrences: Until Specified       RECOMMENDATIONS    We recommend: RRT Recs: Continue POC per primary team.    ESCALATION        FOLLOW-UP    Please call back the Rapid Response RTRylan RRT at x [phone] for any questions or concerns.

## 2022-05-04 NOTE — ASSESSMENT & PLAN NOTE
Patient's with Normocytic anemia.. Hemoglobin stable. Etiology likely due to chronic disease .  Current CBC reviewed-    Recent Labs   Lab 05/02/22  0632 05/03/22  0457 05/04/22  0500   HGB 9.5* 9.5* 9.4*     Monitor CBC and transfuse if H/H drops below 7/21.   - improving

## 2022-05-04 NOTE — PROGRESS NOTES
[facility] - Telemetry [facility] Medicine  Progress Note    Patient Name: Fareed Richard Jr.  MRN: [id number]  Patient Class: IP- Inpatient   Admission Date: 4/19/2022  Length of Stay: 15 days  Attending Physician: Pam Bush MD  Primary Care Provider: Kodi Tubbs MD        Subjective:     Principal Problem:Acute on chronic respiratory failure        HPI:  Mr. 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 Per wife he has had several days of increasing lethargy, increased work of breathing and secretions. Yesterday she became particularly concerned when he became slightly less responsive and interactive. Wife also notes increased yellow thick secretions from trach site. Upon EMS arrival he was on his home 5 L by trach collar and saturating 93% with significant wheezing.  He received 1 DuoNeb by them.  He was also suctioned.     Currently patient is alert, following commands.  He is responding to yes no questions.  He denies any chest pain but does endorse shortness of breath and cough.  He denies any abdominal pain or pain elsewhere       ED course:   He was given 500ccs IVF as well as one time doses of vanc/cefepime/azithro. Labs significant for BNP of 1600, WBC 5k, K 5.6, Bicarb 35, VBG 7.26/87/25/40. Trop wnl. CXR with new cardiomegaly and pulmonary edema. Suctioned with return of copious secretions. ICU was consulted for acute hypoxemic respiratory failure and he will be admitted for further management.              Overview/Hospital Course:    Admitted to ICU, started on CAP coverage and solumedrol. Quickly weaned off vent after return of copious secretions on suction. Has been maintaining sats well on home O2. Rad/onc consulted for completion of radiology while inpatient. Pending culture sensitivity.    4/21 Transfer to hospital  medicine . 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  initially requiring vent but has now been stable on home trach collar for 24 hours. sats 93% on 8L via trach collar.  Awaiting cultures from sputum, on CAP coverage with ceftriaxone. completed Azithromycin. continue solumedrol 40mg IV daily.  rad/onc consulted this morning-he was supposed to complete radiation outpt but was admitted, attempting to set up for inpatient  4/22 changed to bolus GT feedings. s/p radiation oncology eval -on adjuvant chemoradiation, completed 31/33 fractions of RT.  renitiation of RT  inpatient when patient able to tolerate. sats 95% on trach collar 10/ fio2 60% . completed azithromycin.  started on Zosyn for possible aspiration  repeat CX ray-in the inferior hemithorax on the left side consistent with airspace consolidation/volume loss in the left lower lobe is again appreciated, but the lung zones are essentially stable since the prior exam and demonstrate no new areas of airspace consolidation or volume loss. respiratory culture 4/19 with pansensitive  pseudomonas.     4/23 intermittently refusing bolus GT feedings.stable on 10L/60% Fio2 . completed radiation sessions. off solumedrol  today. PT recs SNF  4/24 K and P replaced   4/25 sodium 150. started on D5W . repeat renal panel in PM . oxygen tapered to 8L/ Fio2 35%   4/26 K of 3.2  replaced   4/27 stable on 8L/ Fio2 35% . mucous plug remvove by suction , wants glycopyrrolate PRN due to dry mouth   Interval History:   4/28 - has trach, G tube, NPO, completed radiation treatments, 92% on 8 liters,+ stool and urinations. Weaning oxygen. Wants to go home w HH. Discussed his care and condition with his wife.    4/29 - still requiring 8 liters per NC. Sats low 90s. I lowered oxygen to 5 liters while in the room and he tolerated it.  Keep weaning.   4/30 - back up  to 8 liters per NC, refused some Bolus TFs yesterday  5/1 - not able to wean oxygen.  He may need a LTAC for HF oxygen. Otherwise, progressing,. Sat up in chair yesterday, + urinations and BM.   5/2 - on 8 liters per NC this am. 92%.  His home goal is 5 liters. Pt tends to need more at night.  Plan is to keep him overnight on 5 liters to make sure he is safe for home oxygen.  He may benefit from LTAC a few days. Accepted to LTAC, waiting on ins auth.  Needs PT/OT for deconditioning.  5/3 -  8 liters, up in chair a am, monitor % meals, on nebs q 4 hours. PT/OT, trach care and suctioning. LTAC referrals sent.   5/4 - nurses unable to keep flow rate below 8 overnight, despite coaching. His volume status is stable. Repeat cxr. - Interval development of increased opacification within the right lung base, may reflect developing infiltrate versus atelectasis.  Small bilateral pleural effusions, increased on the left as compared to prior.      Interval History: see above    Review of Systems   Constitutional:  Negative for activity change, appetite change, chills, fatigue and fever.        Trach and PEG  Generalized weakness   HENT:  Negative for congestion, facial swelling, hearing loss, nosebleeds, sore throat and trouble swallowing.         Denies pain   Eyes:  Negative for pain and redness.   Respiratory:  Negative for apnea, cough, choking, chest tightness, shortness of breath, wheezing and stridor.    Cardiovascular:  Negative for chest pain, palpitations and leg swelling.   Gastrointestinal:  Negative for abdominal distention, abdominal pain, blood in stool, constipation, diarrhea, nausea and vomiting.   Genitourinary:  Negative for difficulty urinating, dysuria, flank pain, hematuria and urgency.   Musculoskeletal:  Negative for arthralgias, back pain, gait problem, neck pain and neck stiffness.   Skin:  Negative for color change, pallor and rash.   Neurological:  Negative for dizziness, tremors, syncope, facial  asymmetry, weakness, light-headedness, numbness and headaches.   Psychiatric/Behavioral:  Negative for agitation, behavioral problems, confusion, decreased concentration, hallucinations, self-injury and sleep disturbance. The patient is not nervous/anxious and is not hyperactive.    Objective:     Vital Signs (Most Recent):  Temp: 97.5 °F (36.4 °C) (05/04/22 0802)  Pulse: 89 (05/04/22 0717)  Resp: 18 (05/04/22 0802)  BP: (!) 142/83 (05/04/22 0421)  SpO2: (!) 94 % (05/04/22 0717)   Vital Signs (24h Range):  Temp:  [97.5 °F (36.4 °C)-99 °F (37.2 °C)] 97.5 °F (36.4 °C)  Pulse:  [] 89  Resp:  [18-92] 18  SpO2:  [90 %-97 %] 94 %  BP: (132-149)/(70-83) 142/83     Weight: 62.4 kg (137 lb 9.1 oz)  Body mass index is 20.92 kg/m².    Intake/Output Summary (Last 24 hours) at 5/4/2022 0803  Last data filed at 5/4/2022 0226  Gross per 24 hour   Intake 1275 ml   Output 175 ml   Net 1100 ml        Physical Exam  Constitutional:       General: He is not in acute distress.     Appearance: Normal appearance. He is not ill-appearing or diaphoretic.      Comments: Frail elderly, trach and PEG   HENT:      Head: Normocephalic and atraumatic.      Comments: Thin in the face     Nose: Nose normal.      Mouth/Throat:      Mouth: Mucous membranes are moist.      Pharynx: Oropharynx is clear.   Eyes:      General: No scleral icterus.     Extraocular Movements: Extraocular movements intact.      Conjunctiva/sclera: Conjunctivae normal.      Pupils: Pupils are equal, round, and reactive to light.   Cardiovascular:      Rate and Rhythm: Normal rate and regular rhythm.      Pulses: Normal pulses.      Heart sounds: Normal heart sounds.   Pulmonary:      Effort: Pulmonary effort is normal. No respiratory distress.      Breath sounds: Rales (LEFT LUNG BASE) present. No wheezing or rhonchi.   Abdominal:      General: Abdomen is flat. Bowel sounds are normal. There is no distension.      Palpations: Abdomen is soft.      Tenderness: There is  no abdominal tenderness. There is no right CVA tenderness or guarding.   Musculoskeletal:         General: No swelling, tenderness or deformity. Normal range of motion.      Cervical back: Normal range of motion and neck supple. No rigidity or tenderness.      Comments: atrophy   Skin:     General: Skin is warm and dry.      Coloration: Skin is not jaundiced.      Findings: No rash.   Neurological:      General: No focal deficit present.      Mental Status: He is alert and oriented to person, place, and time. Mental status is at baseline.      Motor: No weakness.   Psychiatric:         Mood and Affect: Mood normal.         Behavior: Behavior normal.         Thought Content: Thought content normal.         Judgment: Judgment normal.       Significant Labs: All pertinent labs within the past 24 hours have been reviewed.  CBC:   Recent Labs   Lab 05/03/22 0457 05/04/22  0500   WBC 4.68 5.00   HGB 9.5* 9.4*   HCT 30.3* 30.6*    214       CMP:   Recent Labs   Lab 05/03/22 0457 05/04/22  0500    141   K 4.0 4.0    103   CO2 29 29   * 119*   BUN 16 19   CREATININE 0.7 0.6   CALCIUM 9.3 9.4   PROT 6.1 6.2   ALBUMIN 2.4* 2.5*   BILITOT 0.2 0.2   ALKPHOS 68 70   AST 13 13   ALT 11 11   ANIONGAP 7* 9   EGFRNONAA >60.0 >60.0         Significant Imaging: I have reviewed all pertinent imaging results/findings within the past 24 hours.      Assessment/Plan:      * Acute on chronic respiratory failure  Pt with SCC of the tongue s/p total glossectomy, chemowith cisplatin, and nearing completion of radiation with trach, COPD, asthma, HTN, CAD s/p PCI presenting for acute on chronic hypoxemic respiratory failure. He is on 5L with trach collar at home. He has had increased yellow secretions, work of breathing and SOB for the past several days. BNP elevated to 1600 on admission with pulmonary edema and new cardiomegaly on CXR. Trop wnl. Bicarb is elevated suggesting chronic hypercapnia. Suctioning has cleared  thick mucous. He was able to be weaned down to his home O2, however after ~ 20 minutes he desats again, requiring deep suction with resolution of hypoxia. Now stable on home O2. Diuresed -3L     -Pt stable on home O2 for 24 hours (5 liters per NC), ready to stepdown to floor  - CAP coverage with ceftriaxone/azithro  - Methylprednisolone 40 mg x5 days for possible element of COPD exacerbation  - Duonebs q4  - Hypertonic saline nebs  - Chest PT  - Suction PRN    4/21 Transfer to hospital medicine . 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  initially requiring vent but has now been stable on home trach collar for 24 hours. sats 93% on 8L via trach collar.  Awaiting cultures from sputum, on CAP coverage with ceftriaxone. completed Azithromycin. continue solumedrol 40mg IV daily.   4/22 sats 95% on trach collar 10/ fio2 60% . completed azithromycin.   started on Zosyn for possible aspiration  repeat CX ray-in the inferior hemithorax on the left side consistent with airspace consolidation/volume loss in the left lower lobe is again appreciated, but the lung zones are essentially stable since the prior exam and demonstrate no new areas of airspace consolidation or volume loss.  respiratory culture 4/19 with pseudomonas.     4/30-  oxygen tapered to 8L/ Fio2 35% .  Needs to be at 5 liters to go home. Add guaifenisen for cough and secertion. Up in chair as much as possible  5/1 - not able to wean oxygen.  He may need a LTAC for HF oxygen.   5/3 - on 8 liters NC ( home goal is 5 liters)  5/4 - Now chronic, repeat cxr shows RLL pneumonia. Resume vanc , zosyn. Consult pulm  Up in chair helps to lower rate. He desats at night. This pt likelt needs a LTAC for HF oxygen. Insurance company did not call me for a peer to peer yesterday.      Aspiration pneumonia  4/22 sats 95% on trach collar 10/ fio2 60% .  completed azithromycin.   started on Zosyn for possible aspiration  repeat CX ray-in the inferior hemithorax on the left side consistent with airspace consolidation/volume loss in the left lower lobe is again appreciated, but the lung zones are essentially stable since the prior exam and demonstrate no new areas of airspace consolidation or volume loss.  respiratory culture 4/19 with pseudomonas.   -  completed zosyn  5/4 - new infiltrate RLL. Resume zosyn, vanc, consult pulm     Squamous cell cancer of tongue    12/15/21 FNA left neck mass : squamous cell carcinoma, p16 negative  1/4/22 total glossectomy, bilateral neck dissection, bilateral cervical facial advancement flaps and anterolateral thigh free flap reconstruction of his glossectomy defect.  Final path :  [id number] (+microscopic SALINAS, single contralateral node), superior soft tissue margin of left neck with tumor.  2/28/22 start chemoradiation  Finished chemo with cisplatin 4/6. Was going to complete final dose of radiation tomorrow.     -Rad/onc consulted for radiation while inpatient  4/22  s/p radiation oncology eval -on adjuvant chemoradiation, completed 31/33 fractions of RT.  renitiation of RT  inpatient when patient able to tolerate.    4/23  completed radiation sessions  5/4 -attempting to  wean oxygen, takes 5 liters at home and has all supplies.     Dysphagia  Nutrition consulted. Most recent weight and BMI monitored-          Malnutrition (Moderate to Severe)  Weight Loss (Malnutrition): greater than 10% in 6 months                 Measurements:  Wt Readings from Last 1 Encounters:   04/27/22 62.4 kg (137 lb 9.1 oz)   Body mass index is 20.92 kg/m².    Recommendations: Recommendation/Intervention: 1.  Goals: Meet % EEN, EPN by RD f/u date    Patient has been screened and assessed by RD. RD will follow patient.    4/21 secondary to above. on G tube feedings   4/22 changed to bolus GT feedings.   4/29 - pt noted to sometimes refuse TFs. , on  bolus TFs, no water added      Hypernatremia  4/25 sodium 150. started on D5W . repeat renal panel in PM   5/4- sodium trended down to 145 -> 140, on TFs -> 138 -> 139, not receiving extra water in TFs -> 141    Hypophosphatemia  Replaced  5/4- stable      Hypokalemia  replaced   5/4 - stable      Chronic respiratory failure with hypoxia, on home O2 therapy  secondary to COPD. on 5L oxygen  at home  5/4  - continue weaning efforts, currently on 8 liters    Protein-calorie malnutrition  Nutrition consulted. Most recent weight and BMI monitored-          Malnutrition (Moderate to Severe)  Weight Loss (Malnutrition): greater than 10% in 6 months              Measurements:  Wt Readings from Last 1 Encounters:   04/27/22 62.4 kg (137 lb 9.1 oz)   Body mass index is 20.92 kg/m².    Recommendations: Recommendation/Intervention: 1.  Goals: Meet % EEN, EPN by RD f/u date    Patient has been screened and assessed by RD. RD will follow patient.  See above      Normocytic anemia    Patient's with Normocytic anemia.. Hemoglobin stable. Etiology likely due to chronic disease .  Current CBC reviewed-    Recent Labs   Lab 05/02/22  0632 05/03/22  0457 05/04/22  0500   HGB 9.5* 9.5* 9.4*     Monitor CBC and transfuse if H/H drops below 7/21.   - improving      Other hyperlipidemia  Continue home statin         Primary hypertension  Chronic, controlled.  Latest blood pressure and vitals reviewed-   Temp:  [97.5 °F (36.4 °C)-99 °F (37.2 °C)]   Pulse:  []   Resp:  [18-92]   BP: (132-149)/(70-83)   SpO2:  [90 %-97 %] .   Home meds for hypertension were reviewed- amlodipine, losartan and metoprolol  held for now  PRN meds if BP> 180/110 mm HG  5/4 - not requiring scheduled meds      Coronary artery disease involving native coronary artery of native heart without angina pectoris  Continue ASA, plavix, and statin   -stable    Goals of care, counseling/discussion  Advance Care Planning      Does patient have Capacity?  Yes  Contact: Bridgett mitchell, wife  Goals/Wishes: wants to go home, HH w Home PT  Code Status- FULL  Pain management- seems comfortable without pain  Prognosis- s/p radiation, progressing cancer, high risk for aspiration, severe malnutrition  Family discussions-  4/28, 4/29 - discussed care and condition w pt and his wife.  He can talk w trach collar but sats drop.  He wants to go home. Has all oxygen supplies at home. Pt completed zosyn.   5/2 - spoke to his wife. Reviewed his care and condition. She was able to calm him down. She is encouraging him. Will start remeron for appetite and depression. She is working hard to keep him comfortable and well-cared for.  Functional Status - has trach and peg. recommended outpt f/u palliative care    Post acute care plan: pt would benefit from outpt referral to Palliative care, plan for HH and home.    5/4 - not able to wean oxygen.  He needs a LTAC for HF oxygen. Waiting on ins approval      Sinus tachycardia        VTE Risk Mitigation (From admission, onward)         Ordered     enoxaparin injection 40 mg  Daily         04/19/22 1123     IP VTE HIGH RISK PATIENT  Once         04/19/22 1123     Place sequential compression device  Until discontinued         04/19/22 1123                Discharge Planning   NISA: 5/4/2022     Code Status: Full Code   Is the patient medically ready for discharge?: Yes    Reason for patient still in hospital (select all that apply): Pending disposition  Discharge Plan A: Long-term acute care facility (LTAC)   Discharge Delays: None known at this time        Pam Bush MD  Department of Hospital Medicine   [facility] - Telemetry Stepdown

## 2022-05-04 NOTE — ASSESSMENT & PLAN NOTE
4/22 sats 95% on trach collar 10/ fio2 60% . completed azithromycin.   started on Zosyn for possible aspiration  repeat CX ray-in the inferior hemithorax on the left side consistent with airspace consolidation/volume loss in the left lower lobe is again appreciated, but the lung zones are essentially stable since the prior exam and demonstrate no new areas of airspace consolidation or volume loss.  respiratory culture 4/19 with pseudomonas.   -  completed zosyn  5/4 - new infiltrate RLL. Resume zosyn, vanc, consult pulm

## 2022-05-04 NOTE — ASSESSMENT & PLAN NOTE
12/15/21 FNA left neck mass : squamous cell carcinoma, p16 negative  1/4/22 total glossectomy, bilateral neck dissection, bilateral cervical facial advancement flaps and anterolateral thigh free flap reconstruction of his glossectomy defect.  Final path :  [id number] (+microscopic SALINAS, single contralateral node), superior soft tissue margin of left neck with tumor.  2/28/22 start chemoradiation  Finished chemo with cisplatin 4/6. Was going to complete final dose of radiation tomorrow.     -Rad/onc consulted for radiation while inpatient  4/22  s/p radiation oncology eval -on adjuvant chemoradiation, completed 31/33 fractions of RT.  renitiation of RT  inpatient when patient able to tolerate.    4/23  completed radiation sessions  5/4 -attempting to  wean oxygen, takes 5 liters at home and has all supplies.

## 2022-05-04 NOTE — NURSING
IV to left AC intact, flushing, but due to be changed.  Instructed patient that I needed to change IV site, but he politely refuses at this time.

## 2022-05-04 NOTE — PT/OT/SLP PROGRESS
Physical Therapy Treatment    Patient Name:  Fareed Richard Jr.   MRN:  [id number]    Recommendations:     Discharge Recommendations:  nursing facility, skilled   Discharge Equipment Recommendations: none   Barriers to discharge: Inaccessible home, Decreased caregiver support and increased assistance required    Assessment:     Fareed Richard Jr. is a 72 y.o. male admitted with a medical diagnosis of Acute on chronic respiratory failure.  He presents with the following impairments/functional limitations:  weakness, impaired endurance, impaired functional mobilty, gait instability, impaired balance, decreased safety awareness, impaired cardiopulmonary response to activity.  Pt participated in therapeutic exercise for BLE strengthening as pt received sitting in chair and declined functional mobility training on this date. Pt performed various exercises for BLE both while seated and in long sitting in recliner. Pt continues to present below their independent prior functional baseline. Pt would benefit from continued, skilled PT while in house to address the above listed impairments, further progress mobility as able, return towards highest level of function.      Rehab Prognosis: Fair; patient continues to benefit from acute skilled PT services to address these deficits and reach maximum level of function.  Patient remains most appropriate to discharge to nursing facility, skilled  Recent Surgery: * No surgery found *      Plan:     During this hospitalization, patient to be seen 3 x/week to address the identified rehab impairments via gait training, therapeutic activities, therapeutic exercises, neuromuscular re-education and progress toward the following goals:    · Plan of Care Expires:  05/23/22    Subjective     Subjective: Pt writing to communicate 2/2 trach   Pain/Comfort:   · Pain Rating 1: 0/10      Objective:     Communicated with RN prior to session.  Patient found up in chair with Tracheostomy, oxygen,  telemetry, pulse ox (continuous) upon PT entry to room.      General Precautions: Standard, fall   Orthopedic Precautions:N/A   Braces: N/A     Functional Mobility: pt deferred all attempts at functional mobility training on this date despite education and encouragement    Exercises: pt performed the following exercises 2x10 while seated in chair: LAQ, ankle pumps, seated marching, quad sets, and hip abduction/adduction, cueing for technique and performance       AM-PAC 6 CLICK MOBILITY  Turning over in bed (including adjusting bedclothes, sheets and blankets)?: 3  Sitting down on and standing up from a chair with arms (e.g., wheelchair, bedside commode, etc.): 3  Moving from lying on back to sitting on the side of the bed?: 3  Moving to and from a bed to a chair (including a wheelchair)?: 3  Need to walk in hospital room?: 2  Climbing 3-5 steps with a railing?: 2  Basic Mobility Total Score: 16       Education:  Education provided re: d/c planning, PT POC, safety in mobility, benefits of mobility, risks of immobility, seated exercises for strengthening. Pt endorsed understanding.     Patient left up in chair with all lines intact and call button in reach.    GOALS:   Multidisciplinary Problems     Physical Therapy Goals        Problem: Physical Therapy    Goal Priority Disciplines Outcome Goal Variances Interventions   Physical Therapy Goal     PT, PT/OT Ongoing, Progressing     Description: Goals to be met by: 22, updated on 22    Patient will increase functional independence with mobility by performin. Pt will perform supine<>sit with supervision to improve independence with mobility  2. Pt will perform functional transfers with supervision   3. Pt will ambulate >150 ft feet with LRAD and supervision to safely perform household distance ambulation  4. Pt will ascend/descend 5 stairs with supervision to safely manage within home.                    Time Tracking:     PT Received On: 22  PT  Start Time: 1053     PT Stop Time: 1103  PT Total Time (min): 10 min     Billable Minutes: Therapeutic Exercise 10 min    Treatment Type: Treatment  PT/PTA: PT     PTA Visit Number: 1     Beatriz Castro PT, DPT  5/4/2022

## 2022-05-04 NOTE — PLAN OF CARE
Problem: Physical Therapy  Goal: Physical Therapy Goal  Description: Goals to be met by: 22, updated on 22    Patient will increase functional independence with mobility by performin. Pt will perform supine<>sit with supervision to improve independence with mobility  2. Pt will perform functional transfers with supervision   3. Pt will ambulate >150 ft feet with LRAD and supervision to safely perform household distance ambulation  4. Pt will ascend/descend 5 stairs with supervision to safely manage within home.   Outcome: Ongoing, Progressing     Goals extended on this date, currently appropriate.    Beatriz Castro, PT, DPT, GCS  2022

## 2022-05-04 NOTE — ASSESSMENT & PLAN NOTE
Advance Care Planning      Does patient have Capacity? Yes  Contact: Bridgett mitchell, wife  Goals/Wishes: wants to go home, HH w Home PT  Code Status- FULL  Pain management- seems comfortable without pain  Prognosis- s/p radiation, progressing cancer, high risk for aspiration, severe malnutrition  Family discussions-  4/28, 4/29 - discussed care and condition w pt and his wife.  He can talk w trach collar but sats drop.  He wants to go home. Has all oxygen supplies at home. Pt completed zosyn.   5/2 - spoke to his wife. Reviewed his care and condition. She was able to calm him down. She is encouraging him. Will start remeron for appetite and depression. She is working hard to keep him comfortable and well-cared for.  Functional Status - has trach and peg. recommended outpt f/u palliative care    Post acute care plan: pt would benefit from outpt referral to Palliative care, plan for HH and home.    5/4 - not able to wean oxygen.  He needs a LTAC for HF oxygen. Waiting on ins approval

## 2022-05-04 NOTE — ASSESSMENT & PLAN NOTE
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 He is on 5L with trach collar at home. He has had increased yellow secretions, work of breathing and SOB for the past several days. BNP elevated to 1600 on admission with pulmonary edema and new cardiomegaly on CXR. Trop wnl. Bicarb is elevated suggesting chronic hypercapnia. Suctioning has cleared thick mucous. He was able to be weaned down to his home O2, however after ~ 20 minutes he desats again, requiring deep suction with resolution of hypoxia. Now stable on home O2. Diuresed -3L     -Pt stable on home O2 for 24 hours (5 liters per NC), ready to stepdown to floor  - CAP coverage with ceftriaxone/azithro  - Methylprednisolone 40 mg x5 days for possible element of COPD exacerbation  - Duonebs q4  - Hypertonic saline nebs  - Chest PT  - Suction PRN    4/21 Transfer to hospital medicine . 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  initially requiring vent but has now been stable on home trach collar for 24 hours. sats 93% on 8L via trach collar.  Awaiting cultures from sputum, on CAP coverage with ceftriaxone. completed Azithromycin. continue solumedrol 40mg IV daily.   4/22 sats 95% on trach collar 10/ fio2 60% . completed azithromycin.   started on Zosyn for possible aspiration  repeat CX ray-in the inferior hemithorax on the left side consistent with airspace consolidation/volume loss in the left lower lobe is again appreciated, but the lung zones are essentially stable since the prior exam and demonstrate no new areas of airspace consolidation or volume loss.  respiratory culture 4/19 with pseudomonas.     4/30-  oxygen tapered to 8L/ Fio2 35% .  Needs to be at 5 liters to go  home. Add guaifenisen for cough and secertion. Up in chair as much as possible  5/1 - not able to wean oxygen.  He may need a LTAC for HF oxygen.   5/3 - on 8 liters NC ( home goal is 5 liters)  5/4 - Now chronic, repeat cxr shows RLL pneumonia. Resume vanc , zosyn. Consult pulm  Up in chair helps to lower rate. He desats at night. This pt likelt needs a LTAC for HF oxygen. Insurance company did not call me for a peer to peer yesterday.

## 2022-05-04 NOTE — ASSESSMENT & PLAN NOTE
4/25 sodium 150. started on D5W . repeat renal panel in PM   5/4- sodium trended down to 145 -> 140, on TFs -> 138 -> 139, not receiving extra water in TFs -> 141

## 2022-05-05 LAB
ALBUMIN SERPL BCP-MCNC: 2.4 G/DL (ref 3.5–5.2)
ALP SERPL-CCNC: 68 U/L (ref 55–135)
ALT SERPL W/O P-5'-P-CCNC: 9 U/L (ref 10–44)
ANION GAP SERPL CALC-SCNC: 8 MMOL/L (ref 8–16)
AST SERPL-CCNC: 11 U/L (ref 10–40)
BASOPHILS # BLD AUTO: 0.02 K/UL (ref 0–0.2)
BASOPHILS NFR BLD: 0.5 % (ref 0–1.9)
BILIRUB SERPL-MCNC: 0.2 MG/DL (ref 0.1–1)
BUN SERPL-MCNC: 20 MG/DL (ref 8–23)
CALCIUM SERPL-MCNC: 8.8 MG/DL (ref 8.7–10.5)
CHLORIDE SERPL-SCNC: 101 MMOL/L (ref 95–110)
CO2 SERPL-SCNC: 31 MMOL/L (ref 23–29)
CREAT SERPL-MCNC: 0.7 MG/DL (ref 0.5–1.4)
DIFFERENTIAL METHOD: ABNORMAL
EOSINOPHIL # BLD AUTO: 0.1 K/UL (ref 0–0.5)
EOSINOPHIL NFR BLD: 1.6 % (ref 0–8)
ERYTHROCYTE [DISTWIDTH] IN BLOOD BY AUTOMATED COUNT: 14.6 % (ref 11.5–14.5)
EST. GFR  (AFRICAN AMERICAN): >60 ML/MIN/1.73 M^2
EST. GFR  (NON AFRICAN AMERICAN): >60 ML/MIN/1.73 M^2
GLUCOSE SERPL-MCNC: 110 MG/DL (ref 70–110)
HCT VFR BLD AUTO: 36.5 % (ref 40–54)
HGB BLD-MCNC: 8.9 G/DL (ref 14–18)
IMM GRANULOCYTES # BLD AUTO: 0.02 K/UL (ref 0–0.04)
IMM GRANULOCYTES NFR BLD AUTO: 0.5 % (ref 0–0.5)
LYMPHOCYTES # BLD AUTO: 0.5 K/UL (ref 1–4.8)
LYMPHOCYTES NFR BLD: 12.2 % (ref 18–48)
MAGNESIUM SERPL-MCNC: 2.1 MG/DL (ref 1.6–2.6)
MCH RBC QN AUTO: 30.4 PG (ref 27–31)
MCHC RBC AUTO-ENTMCNC: 24.4 G/DL (ref 32–36)
MCV RBC AUTO: 125 FL (ref 82–98)
MONOCYTES # BLD AUTO: 0.6 K/UL (ref 0.3–1)
MONOCYTES NFR BLD: 13.5 % (ref 4–15)
NEUTROPHILS # BLD AUTO: 3.1 K/UL (ref 1.8–7.7)
NEUTROPHILS NFR BLD: 71.7 % (ref 38–73)
NRBC BLD-RTO: 0 /100 WBC
PHOSPHATE SERPL-MCNC: 4.5 MG/DL (ref 2.7–4.5)
PLATELET # BLD AUTO: 238 K/UL (ref 150–450)
PMV BLD AUTO: 11.2 FL (ref 9.2–12.9)
POTASSIUM SERPL-SCNC: 4 MMOL/L (ref 3.5–5.1)
PROT SERPL-MCNC: 5.6 G/DL (ref 6–8.4)
RBC # BLD AUTO: 2.93 M/UL (ref 4.6–6.2)
SODIUM SERPL-SCNC: 140 MMOL/L (ref 136–145)
WBC # BLD AUTO: 4.36 K/UL (ref 3.9–12.7)

## 2022-05-05 PROCEDURE — 93010 ELECTROCARDIOGRAM REPORT: CPT | Mod: ,,, | Performed by: INTERNAL MEDICINE

## 2022-05-05 PROCEDURE — 99900026 HC AIRWAY MAINTENANCE (STAT)

## 2022-05-05 PROCEDURE — 94640 AIRWAY INHALATION TREATMENT: CPT

## 2022-05-05 PROCEDURE — 84100 ASSAY OF PHOSPHORUS: CPT | Performed by: HOSPITALIST

## 2022-05-05 PROCEDURE — 80053 COMPREHEN METABOLIC PANEL: CPT | Performed by: HOSPITALIST

## 2022-05-05 PROCEDURE — 36415 COLL VENOUS BLD VENIPUNCTURE: CPT | Performed by: HOSPITALIST

## 2022-05-05 PROCEDURE — 93010 EKG 12-LEAD: ICD-10-PCS | Mod: ,,, | Performed by: INTERNAL MEDICINE

## 2022-05-05 PROCEDURE — 20600001 HC STEP DOWN PRIVATE ROOM

## 2022-05-05 PROCEDURE — 93005 ELECTROCARDIOGRAM TRACING: CPT

## 2022-05-05 PROCEDURE — 63600175 PHARM REV CODE 636 W HCPCS: Performed by: HOSPITALIST

## 2022-05-05 PROCEDURE — 83735 ASSAY OF MAGNESIUM: CPT | Performed by: HOSPITALIST

## 2022-05-05 PROCEDURE — 25000003 PHARM REV CODE 250: Performed by: HOSPITALIST

## 2022-05-05 PROCEDURE — 25000003 PHARM REV CODE 250: Performed by: STUDENT IN AN ORGANIZED HEALTH CARE EDUCATION/TRAINING PROGRAM

## 2022-05-05 PROCEDURE — 99900035 HC TECH TIME PER 15 MIN (STAT)

## 2022-05-05 PROCEDURE — 27000221 HC OXYGEN, UP TO 24 HOURS

## 2022-05-05 PROCEDURE — 25000242 PHARM REV CODE 250 ALT 637 W/ HCPCS: Performed by: INTERNAL MEDICINE

## 2022-05-05 PROCEDURE — 94761 N-INVAS EAR/PLS OXIMETRY MLT: CPT

## 2022-05-05 PROCEDURE — 63600175 PHARM REV CODE 636 W HCPCS: Performed by: INTERNAL MEDICINE

## 2022-05-05 PROCEDURE — 99233 SBSQ HOSP IP/OBS HIGH 50: CPT | Mod: ,,, | Performed by: HOSPITALIST

## 2022-05-05 PROCEDURE — 99233 PR SUBSEQUENT HOSPITAL CARE,LEVL III: ICD-10-PCS | Mod: ,,, | Performed by: HOSPITALIST

## 2022-05-05 PROCEDURE — 85025 COMPLETE CBC W/AUTO DIFF WBC: CPT | Performed by: HOSPITALIST

## 2022-05-05 RX ORDER — DOXYCYCLINE HYCLATE 100 MG
100 TABLET ORAL EVERY 12 HOURS
Status: CANCELLED | OUTPATIENT
Start: 2022-05-05

## 2022-05-05 RX ADMIN — IPRATROPIUM BROMIDE AND ALBUTEROL SULFATE 3 ML: 2.5; .5 SOLUTION RESPIRATORY (INHALATION) at 12:05

## 2022-05-05 RX ADMIN — GLYCOPYRROLATE 2 MG: 1 LIQUID ORAL at 08:05

## 2022-05-05 RX ADMIN — IPRATROPIUM BROMIDE AND ALBUTEROL SULFATE 3 ML: 2.5; .5 SOLUTION RESPIRATORY (INHALATION) at 04:05

## 2022-05-05 RX ADMIN — VANCOMYCIN HYDROCHLORIDE 1000 MG: 1 INJECTION, POWDER, LYOPHILIZED, FOR SOLUTION INTRAVENOUS at 02:05

## 2022-05-05 RX ADMIN — GUAIFENESIN 200 MG: 100 SOLUTION ORAL at 02:05

## 2022-05-05 RX ADMIN — FOLIC ACID 1 MG: 1 TABLET ORAL at 08:05

## 2022-05-05 RX ADMIN — IPRATROPIUM BROMIDE AND ALBUTEROL SULFATE 3 ML: 2.5; .5 SOLUTION RESPIRATORY (INHALATION) at 07:05

## 2022-05-05 RX ADMIN — IPRATROPIUM BROMIDE AND ALBUTEROL SULFATE 3 ML: 2.5; .5 SOLUTION RESPIRATORY (INHALATION) at 03:05

## 2022-05-05 RX ADMIN — MELATONIN TAB 3 MG 6 MG: 3 TAB at 08:05

## 2022-05-05 RX ADMIN — PIPERACILLIN SODIUM AND TAZOBACTAM SODIUM 4.5 G: 4; .5 INJECTION, POWDER, LYOPHILIZED, FOR SOLUTION INTRAVENOUS at 11:05

## 2022-05-05 RX ADMIN — THIAMINE HCL TAB 100 MG 100 MG: 100 TAB at 08:05

## 2022-05-05 RX ADMIN — ENOXAPARIN SODIUM 40 MG: 40 INJECTION SUBCUTANEOUS at 05:05

## 2022-05-05 RX ADMIN — IPRATROPIUM BROMIDE AND ALBUTEROL SULFATE 3 ML: 2.5; .5 SOLUTION RESPIRATORY (INHALATION) at 09:05

## 2022-05-05 RX ADMIN — PIPERACILLIN SODIUM AND TAZOBACTAM SODIUM 4.5 G: 4; .5 INJECTION, POWDER, LYOPHILIZED, FOR SOLUTION INTRAVENOUS at 03:05

## 2022-05-05 RX ADMIN — ATORVASTATIN CALCIUM 20 MG: 20 TABLET, FILM COATED ORAL at 08:05

## 2022-05-05 RX ADMIN — ASPIRIN 81 MG CHEWABLE TABLET 81 MG: 81 TABLET CHEWABLE at 08:05

## 2022-05-05 RX ADMIN — CLOPIDOGREL 75 MG: 75 TABLET, FILM COATED ORAL at 08:05

## 2022-05-05 RX ADMIN — PIPERACILLIN SODIUM AND TAZOBACTAM SODIUM 4.5 G: 4; .5 INJECTION, POWDER, LYOPHILIZED, FOR SOLUTION INTRAVENOUS at 07:05

## 2022-05-05 NOTE — NURSING
2000  Received report for pt from AM Nurse. Pt resting in bed. AAOx4. Trach; 5L 35% FIO2. VSS. Tele box in place. 7/10 pain. Education regarding fall and safety precaution provided. Safety check performed. Call bell within reach. Will continue to monitor.    0000  Pt resting in bed. Medication administered per MAR. Safety check performed. Call bell within reach. Will continue to monitor.

## 2022-05-05 NOTE — RESPIRATORY THERAPY
RAPID RESPONSE RESPIRATORY THERAPY PROACTIVE NOTE           Time of visit: 947     Code Status: Full Code   : 1950  Bed: 8094/8094 A:   MRN: [id number]  Time spent at the bedside: < 15 min    SITUATION    Evaluated patient for: LDA Check     BACKGROUND    Patient has a past medical history of Cancer, COPD (chronic obstructive pulmonary disease), Hyperlipidemia, Hypertension, and Pseudoaneurysm.  Clinically Significant Surgical Hx: tracheostomy    24 Hours Vitals Range:  Temp:  [96.8 °F (36 °C)-98.5 °F (36.9 °C)]   Pulse:  []   Resp:  [17-24]   BP: ()/(64-76)   SpO2:  [89 %-97 %]     Labs:    Recent Labs     22  0457 22  0500 22  0630    141 140   K 4.0 4.0 4.0    103 101   CO2 29 29 31*   CREATININE 0.7 0.6 0.7   * 119* 110   PHOS 4.2 4.2 4.5   MG 1.9 2.1 2.1        No results for input(s): PH, PCO2, PO2, HCO3, POCSATURATED, BE in the last 72 hours.    ASSESSMENT/INTERVENTIONS    Upon arrival in room Pt resting comfortably in bed. All trach supplies in room. Extra Shiley size 4 uncuffed and 2 size 6 cuffed trachs at bedside.    Last VS   Temp: 97.9 °F (36.6 °C) ( 1500)  Pulse: 100 ( 1645)  Resp: 22 ( 164)  BP: 123/74 ( 1500)  SpO2: 93 % ( 164)    Level of Consciousness: Level of Consciousness (AVPU): alert  Respiratory Effort: Respiratory Effort: Unlabored, Normal Expansion/Accessory Muscle Usage: Expansion/Accessory Muscles/Retractions: expansion symmetric, no retractions, no use of accessory muscles  All Lung Field Breath Sounds: All Lung Fields Breath Sounds: equal bilaterally  DEE Breath Sounds: coarse, diminished  LLL Breath Sounds: diminished  RUL Breath Sounds: coarse, diminished  RML Breath Sounds: diminished  RLL Breath Sounds: diminished  O2 Device/Concentration: Flow (L/min): 5, Oxygen Concentration (%): 28, O2 Device (Oxygen Therapy): Trach Collar  Surgical airway: Yes, Type: Shiley Size: 6, uncuffed  Ambu at bedside: Ambu  bag with the patient?: Yes, Adult Ambu     Active Orders   Respiratory Care    Chest physiotherapy TID     Frequency: TID     Number of Occurrences: Until Specified     Order Questions:      Indications: COPIOUS SPUTUM PRODUCTION      Indications: ATELECTASIS PROPHYLAXIS      Indications: ATELECTASIS NONRESPONSIVE TO TX    Inhalation Treatment Q4H     Frequency: Q4H     Number of Occurrences: Until Specified    Oxygen Continuous     Frequency: Continuous     Number of Occurrences: Until Specified     Order Questions:      Device type: Low flow      Device: Trach Collar (Comment: 8L)      FiO2%: 35%      Titrate O2 per Oxygen Titration Protocol: Yes      To maintain SpO2 goal of: >= 90%      Notify MD of: Inability to achieve desired SpO2; Sudden change in patient status and requires 20% increase in FiO2; Patient requires >60% FiO2    Pulse Oximetry Continuous     Frequency: Continuous     Number of Occurrences: Until Specified    Routine tracheostomy care     Frequency: BID     Number of Occurrences: Until Specified       RECOMMENDATIONS    We recommend: RRT Recs: Continue POC per primary team.      FOLLOW-UP    Please call back the Rapid Response RT, Andreea Gardner RRT at x [phone] for any questions or concerns.

## 2022-05-05 NOTE — PROGRESS NOTES
[facility] - Telemetry [facility] Medicine  Progress Note    Patient Name: Fareed Richard Jr.  MRN: [id number]  Patient Class: IP- Inpatient   Admission Date: 4/19/2022  Length of Stay: 16 days  Attending Physician: Jordan Armenta MD  Primary Care Provider: Kodi Tubbs MD        Subjective:     Principal Problem:Acute on chronic respiratory failure        HPI:  Mr. 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 Per wife he has had several days of increasing lethargy, increased work of breathing and secretions. Yesterday she became particularly concerned when he became slightly less responsive and interactive. Wife also notes increased yellow thick secretions from trach site. Upon EMS arrival he was on his home 5 L by trach collar and saturating 93% with significant wheezing.  He received 1 DuoNeb by them.  He was also suctioned.     Currently patient is alert, following commands.  He is responding to yes no questions.  He denies any chest pain but does endorse shortness of breath and cough.  He denies any abdominal pain or pain elsewhere       ED course:   He was given 500ccs IVF as well as one time doses of vanc/cefepime/azithro. Labs significant for BNP of 1600, WBC 5k, K 5.6, Bicarb 35, VBG 7.26/87/25/40. Trop wnl. CXR with new cardiomegaly and pulmonary edema. Suctioned with return of copious secretions. ICU was consulted for acute hypoxemic respiratory failure and he will be admitted for further management.              Overview/Hospital Course:    Admitted to ICU, started on CAP coverage and solumedrol. Quickly weaned off vent after return of copious secretions on suction. Has been maintaining sats well on home O2. Rad/onc consulted for completion of radiology while inpatient. Pending culture sensitivity.    4/21 Transfer to hospital  medicine . 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  initially requiring vent but has now been stable on home trach collar for 24 hours. sats 93% on 8L via trach collar.  Awaiting cultures from sputum, on CAP coverage with ceftriaxone. completed Azithromycin. continue solumedrol 40mg IV daily.  rad/onc consulted this morning-he was supposed to complete radiation outpt but was admitted, attempting to set up for inpatient  4/22 changed to bolus GT feedings. s/p radiation oncology eval -on adjuvant chemoradiation, completed 31/33 fractions of RT.  renitiation of RT  inpatient when patient able to tolerate. sats 95% on trach collar 10/ fio2 60% . completed azithromycin.  started on Zosyn for possible aspiration  repeat CX ray-in the inferior hemithorax on the left side consistent with airspace consolidation/volume loss in the left lower lobe is again appreciated, but the lung zones are essentially stable since the prior exam and demonstrate no new areas of airspace consolidation or volume loss. respiratory culture 4/19 with pansensitive  pseudomonas.     4/23 intermittently refusing bolus GT feedings.stable on 10L/60% Fio2 . completed radiation sessions. off solumedrol  today. PT recs SNF  4/24 K and P replaced   4/25 sodium 150. started on D5W . repeat renal panel in PM . oxygen tapered to 8L/ Fio2 35%   4/26 K of 3.2  replaced   4/27 stable on 8L/ Fio2 35% . mucous plug remvove by suction , wants glycopyrrolate PRN due to dry mouth   Interval History:   4/28 - has trach, G tube, NPO, completed radiation treatments, 92% on 8 liters,+ stool and urinations. Weaning oxygen. Wants to go home w HH. Discussed his care and condition with his wife.    4/29 - still requiring 8 liters per NC. Sats low 90s. I lowered oxygen to 5 liters while in the room and he tolerated it.  Keep weaning.   4/30 - back up  to 8 liters per NC, refused some Bolus TFs yesterday  5/1 - not able to wean oxygen.  He may need a LTAC for HF oxygen. Otherwise, progressing,. Sat up in chair yesterday, + urinations and BM.   5/2 - on 8 liters per NC this am. 92%.  His home goal is 5 liters. Pt tends to need more at night.  Plan is to keep him overnight on 5 liters to make sure he is safe for home oxygen.  He may benefit from LTAC a few days. Accepted to LTAC, waiting on ins auth.  Needs PT/OT for deconditioning.  5/3 -  8 liters, up in chair a am, monitor % meals, on nebs q 4 hours. PT/OT, trach care and suctioning. LTAC referrals sent.   5/4 - nurses unable to keep flow rate below 8 overnight, despite coaching. His volume status is stable. Repeat cxr. - Interval development of increased opacification within the right lung base, may reflect developing infiltrate versus atelectasis.  Small bilateral pleural effusions, increased on the left as compared to prior.  5/5  past week not able to wean oxygen.   sats 93 % on 8L NC/ fio2 35%. tapered to 5L/28% needs LTAC for HF oxygen.  repeated a cxray 5/4 - Interval development of increased opacification within the right lung base, may reflect developing infiltrate versus atelectasis. started antibiotics- vanc and zosyn.  Consider pulmonary eval .  just completed  course of zosyn 4/29              Review of Systems:   Pain scale:   Constitutional:  fever,  chills, headache, vision loss, hearing loss, weight loss, Generalized weakness, falls, loss of smell, loss of taste, poor appetite,  sore throat, voice change,immunocompromised state.   Respiratory: cough, shortness of breath.   Cardiovascular: chest pain, dizziness, palpitations, orthopnea, swelling of feet, syncope  Gastrointestinal: nausea, vomiting, abdominal pain, diarrhea, black stool,  blood in stool, change in bowel habits  Genitourinary: hematuria, dysuria, urgency, frequency  Integument/Breast: rash,  pruritis  Hematologic/Lymphatic: easy  bruising, lymphadenopathy  Musculoskeletal: arthralgias , myalgias, back pain, neck pain, knee pain  Neurological: confusion, seizures, tremors, slurred speech  Behavioral/Psych:  depression, anxiety, auditory or visual hallucinations        OBJECTIVE:     Physical Exam:  Body mass index is 20.92 kg/m².       Constitutional: Appears  thin built   Head: Normocephalic and atraumatic.   Neck: Normal range of motion. Neck supple. trach collar  Cardiovascular: Normal heart rate.  Regular heart rhythm.  Pulmonary/Chest: Effort normal.   Abdominal: No distension.  No tenderness, PEG tube insitu   Musculoskeletal: Normal range of motion. No edema.   Neurological: Alert and oriented to person, place, and time.   Skin: Skin is warm and dry.   Psychiatric: Normal mood and affect. Behavior is normal.       Vital Signs  Temp: 97.9 °F (36.6 °C) (05/05/22 1500)  Pulse: 93 (05/05/22 1257)  Resp: 20 (05/05/22 1257)  BP: 123/74 (05/05/22 1500)  SpO2: 97 % (05/05/22 1257)     24 Hour VS Range    Temp:  [96.8 °F (36 °C)-98.5 °F (36.9 °C)]   Pulse:  []   Resp:  [17-24]   BP: ()/(64-76)   SpO2:  [89 %-97 %]     Intake/Output Summary (Last 24 hours) at 5/5/2022 1631  Last data filed at 5/5/2022 0900  Gross per 24 hour   Intake 325 ml   Output 600 ml   Net -275 ml         I/O This Shift:  I/O this shift:  In: 325 [NG/GT:325]  Out: 250 [Urine:250]    Wt Readings from Last 3 Encounters:   05/04/22 62.4 kg (137 lb 9.1 oz)   04/06/22 63 kg (138 lb 14.2 oz)   04/04/22 63 kg (139 lb)       I have personally reviewed the vitals and recorded Intake/Output     Laboratory/Diagnostic Data:    CBC/Anemia Labs: Coags:    Recent Labs   Lab 05/03/22  0457 05/04/22  0500 05/05/22  0630   WBC 4.68 5.00 4.36   HGB 9.5* 9.4* 8.9*   HCT 30.3* 30.6* 36.5*    214 238   MCV 95 95 125*   RDW 16.7* 16.5* 14.6*    No results for input(s): PT, INR, APTT in the last 168 hours.     Chemistries: ABG:   Recent Labs   Lab 05/03/22  0457 05/04/22  6670  05/05/22  0630    141 140   K 4.0 4.0 4.0    103 101   CO2 29 29 31*   BUN 16 19 20   CREATININE 0.7 0.6 0.7   CALCIUM 9.3 9.4 8.8   PROT 6.1 6.2 5.6*   BILITOT 0.2 0.2 0.2   ALKPHOS 68 70 68   ALT 11 11 9*   AST 13 13 11   MG 1.9 2.1 2.1   PHOS 4.2 4.2 4.5    No results for input(s): PH, PCO2, PO2, HCO3, POCSATURATED, BE in the last 168 hours.     POCT Glucose: HbA1c:    No results for input(s): POCTGLUCOSE in the last 168 hours. No results found for: HGBA1C     Cardiac Enzymes: Ejection Fractions:    No results for input(s): CPK, CPKMB, MB, TROPONINI in the last 72 hours. EF   Date Value Ref Range Status   04/19/2022 60 % Final   12/30/2021 65 % Final          No results for input(s): COLORU, APPEARANCEUA, PHUR, SPECGRAV, PROTEINUA, GLUCUA, KETONESU, BILIRUBINUA, OCCULTUA, NITRITE, UROBILINOGEN, LEUKOCYTESUR, RBCUA, WBCUA, BACTERIA, SQUAMEPITHEL, HYALINECASTS in the last 48 hours.    Invalid input(s): WRIGHTSUR    Procalcitonin (ng/mL)   Date Value   04/19/2022 0.09     Lactate (Lactic Acid) (mmol/L)   Date Value   04/19/2022 1.0     BNP (pg/mL)   Date Value   04/22/2022 132 (H)   04/19/2022 1,607 (H)   03/21/2022 242 (H)   11/02/2019 260 (H)   12/27/2018 239 (H)     No results found for: CRP, SEDRATE  No results found for: DDIMER  Ferritin (ng/mL)   Date Value   02/16/2022 135     No results found for: LDH  Troponin I (ng/mL)   Date Value   04/19/2022 0.018   11/02/2019 0.016   12/27/2018 <0.006     No results found for this or any previous visit.  SARS-CoV2 (COVID-19) Qualitative PCR (no units)   Date Value   05/02/2022 Not Detected   12/31/2021 Not Detected     SARS-CoV-2 RNA, Amplification, Qual (no units)   Date Value   01/13/2022 Negative   01/03/2022 Negative     POC Rapid COVID (no units)   Date Value   04/19/2022 Negative       Microbiology labs for the last week  Microbiology Results (last 7 days)     Procedure Component Value Units Date/Time    Culture, Respiratory with Gram Stain  [[id number]]     Order Status: No result Specimen: Respiratory           Reviewed and noted in plan where applicable- Please see chart for full lab data.    Lines/Drains:       Peripheral IV - Single Lumen 04/25/22 2300 22 G Anterior;Distal;Left Upper Arm (Active)   Site Assessment Clean;Dry;Intact 05/05/22 0318   Extremity Assessment Distal to IV No abnormal discoloration 05/04/22 1100   Line Status Saline locked 05/04/22 1100   Dressing Status Clean;Dry;Intact 05/04/22 1100   Dressing Intervention Integrity maintained 05/04/22 1100   Dressing Change Due 04/28/22 04/28/22 0912   Site Change Due 04/26/22 04/28/22 0912   Reason Not Rotated Poor venous access 05/03/22 0735   Number of days: 9            Gastrostomy/Enterostomy 01/04/22 Percutaneous endoscopic gastrostomy (PEG) LUQ (Active)   Securement secured to abdomen 05/04/22 1100   Interventions Prior to Feeding patency checked;residual checked 05/04/22 1100   Feeding Type bolus 05/04/22 1100   Clamp Status/Tolerance clamped;no abdominal discomfort;no abdominal distention;no emesis;no nausea;no residual;no restlessness 05/04/22 1100   Feeding Action feeding continued 05/03/22 0735   Dressing dry and intact 05/04/22 1100   Insertion Site no redness;no warmth;no drainage;no tenderness;no swelling 05/04/22 1100   Site Care secured w/ tape 05/04/22 1100   Flush/Irrigation flushed w/;water;no resistance met 05/04/22 1100   Dressing Change Due 04/28/22 04/28/22 0912   Intake (mL) 60 mL 04/30/22 1250   Water Bolus (mL) 50 mL 05/03/22 1700   Tube Output(mL)(Include Discarded Residual) 0 mL 04/28/22 1700   Formula Name Isosource 05/03/22 0735   Tube Feeding Intake (mL) 375 05/03/22 1700   Residual Amount (ml) 0 ml 05/04/22 1100   Length Of Feeding (Min) 15 04/28/22 0912   Number of days: 121       Imaging  ECG Results          EKG 12-lead (Final result)  Result time 04/19/22 17:42:02    Final result by Interface, Lab In [facility] (04/19/22 17:42:02)             Narrative:     Test Reason : R06.02,    Vent. Rate : 081 BPM     Atrial Rate : 081 BPM     P-R Int : 158 ms          QRS Dur : 092 ms      QT Int : 366 ms       P-R-T Axes : 061 -70 045 degrees     QTc Int : 425 ms    Normal sinus rhythm  Left axis deviation Now present  Incomplete right bundle branch block  Abnormal ECG  When compared with ECG of 10-MADONNA-2022 10:11,    Confirmed by DIONNE SINGH MD ([id number]) on 4/19/2022 5:41:52 PM    Referred By: [email or web address]   SELF           Confirmed By:DIONNE SINGH MD                            Results for orders placed during the hospital encounter of 04/19/22    Echo    Interpretation Summary  · The left ventricle is normal in size with normal systolic function. The estimated ejection fraction is 60%.  · Normal right ventricular size with normal right ventricular systolic function.  · Normal left ventricular diastolic function.  · The aortic root is mildly dilated.  · Mechanically ventilated; cannot use inferior caval vein diameter to estimate central venous pressure. The IVC is not enlarged and does collapse somewhat with the respiratory cycle, essentially ruling out venous hypertension.      X-Ray Chest AP Portable  Narrative: EXAMINATION:  XR CHEST AP PORTABLE    CLINICAL HISTORY:  hypoxia;    TECHNIQUE:  Single frontal view of the chest was performed.    COMPARISON:  Chest radiograph 04/22/2022, 04/21/2022, 04/19/2022.    FINDINGS:  Monitoring leads overlie the thorax.  There is a tracheostomy cannula in stable positioning.  The trachea is midline.  Cardiomediastinal silhouette is stable.  There is aortic plaque.  There are small bilateral pleural effusions, probably increased on the left.  Additionally, there is increased opacification over the right lung base, which may reflect atelectasis or developing infiltrate.  No pneumothorax.  The osseous structures appear intact.    There are postoperative changes in the right axillary region there is no evidence of free air beneath the  hemidiaphragms.  Impression: Interval development of increased opacification within the right lung base, may reflect developing infiltrate versus atelectasis.    Small bilateral pleural effusions, increased on the left as compared to prior.    Electronically signed by resident: Tim Aparicio  Date:    05/04/2022  Time:    08:52    Electronically signed by: Daniel Álvarez MD  Date:    05/04/2022  Time:    09:12      Labs, Imaging, EKG and Diagnostic results from 5/5/2022 were reviewed.    Medications:  Medication list was reviewed and changes noted under Assessment/Plan.  No current facility-administered medications on file prior to encounter.     Current Outpatient Medications on File Prior to Encounter   Medication Sig Dispense Refill    amLODIPine (NORVASC) 10 MG tablet Take 10 mg by mouth once daily.      atorvastatin (LIPITOR) 20 MG tablet 1 tablet (20 mg total) by Per G Tube route once daily. 90 tablet 3    glycopyrrolate (CUVPOSA) 1 mg/5 mL (0.2 mg/mL) Soln Take 5 mLs (1 mg total) by mouth 3 (three) times daily as needed (TO REDUCE SECRETIONS). 473 mL 1    acetaminophen (TYLENOL) 325 MG tablet 2 tablets (650 mg total) by Per G Tube route every 6 (six) hours.  0    albuterol-ipratropium (DUO-NEB) 2.5 mg-0.5 mg/3 mL nebulizer solution Take 3 mLs by nebulization every 4 (four) hours as needed for Wheezing. Rescue 75 mL 0    aspirin 81 MG Chew 1 tablet (81 mg total) by Per G Tube route once daily.  0    bisacodyL (DULCOLAX) 10 mg Supp Place 1 suppository (10 mg total) rectally daily as needed.  0    clopidogreL (PLAVIX) 75 mg tablet 1 tablet (75 mg total) by Per G Tube route once daily. 30 tablet 11    duke's soln (benadryl 30 mL, mylanta 30 mL, LIDOcaine 30 mL, nystatin 30 mL) 120mL Take 10 mLs by mouth 4 (four) times daily as needed (mucositis). 360 mL 0    enoxaparin (LOVENOX) 40 mg/0.4 mL Syrg Inject 0.4 mLs (40 mg total) into the skin once daily.      folic acid (FOLVITE) 1 MG tablet 1 tablet (1 mg  total) by Per G Tube route once daily.  0    guaiFENesin 200 mg/5 mL Liqd Take 400 mg by mouth every 4 (four) hours as needed (for secretions). 473 mL 3    losartan (COZAAR) 50 MG tablet 1 tablet (50 mg total) by Per G Tube route once daily. 90 tablet 3    melatonin (MELATIN) 3 mg tablet 2 tablets (6 mg total) by Per G Tube route nightly.  0    metoprolol tartrate (LOPRESSOR) 100 MG tablet 1 tablet (100 mg total) by Per G Tube route 2 (two) times daily. 60 tablet 11    ondansetron (ZOFRAN-ODT) 8 MG TbDL Take 1 tablet (8 mg total) by mouth every 8 (eight) hours as needed (nausea). 30 tablet 1    oxyCODONE (ROXICODONE) 5 mg/5 mL Soln 5 mLs (5 mg total) by Per G Tube route every 4 (four) hours as needed (Pain). 150 mL 0    polyethylene glycol (GLYCOLAX) 17 gram PwPk 17 g by Per G Tube route 2 (two) times daily.  0    senna-docusate 8.6-50 mg (PERICOLACE) 8.6-50 mg per tablet 1 tablet by Per G Tube route 2 (two) times daily.      sodium chloride (OCEAN) 0.65 % nasal spray 1 spray by Nasal route as needed for Congestion.  12    sodium chloride 3% 3 % nebulizer solution Take 4 mLs by nebulization every 8 (eight) hours.      thiamine 100 MG tablet 1 tablet (100 mg total) by Per G Tube route once daily.       Scheduled Medications:  albuterol-ipratropium, 3 mL, Nebulization, Q4H  aspirin, 81 mg, Per G Tube, Daily  atorvastatin, 20 mg, Per G Tube, Daily  clopidogreL, 75 mg, Per G Tube, Daily  enoxaparin, 40 mg, Subcutaneous, Daily  folic acid, 1 mg, Per G Tube, Daily  guaiFENesin 100 mg/5 ml, 200 mg, Per G Tube, Q8H  melatonin, 6 mg, Per G Tube, Nightly  piperacillin-tazobactam (ZOSYN) IVPB, 4.5 g, Intravenous, Q8H  polyethylene glycol, 17 g, Per G Tube, BID  scopolamine, 1 patch, Transdermal, Q3 Days  thiamine, 100 mg, Per G Tube, Daily  vancomycin (VANCOCIN) IVPB, 1,000 mg, Intravenous, Q12H      PRN: sodium chloride, albuterol-ipratropium, bisacodyL, glycopyrrolate, ondansetron, oxyCODONE, sodium chloride,  sodium chloride 0.9%  Infusions:   Estimated Creatinine Clearance: 83 mL/min (based on SCr of 0.7 mg/dL).    Assessment/Plan:      * Acute on chronic respiratory failure  Pt with SCC of the tongue s/p total glossectomy, chemowith cisplatin, and nearing completion of radiation with trach, COPD, asthma, HTN, CAD s/p PCI presenting for acute on chronic hypoxemic respiratory failure. He is on 5L with trach collar at home. He has had increased yellow secretions, work of breathing and SOB for the past several days. BNP elevated to 1600 on admission with pulmonary edema and new cardiomegaly on CXR. Trop wnl. Bicarb is elevated suggesting chronic hypercapnia. Suctioning has cleared thick mucous. He was able to be weaned down to his home O2, however after ~ 20 minutes he desats again, requiring deep suction with resolution of hypoxia. Now stable on home O2. Diuresed -3L     -Pt stable on home O2 for 24 hours (5 liters per NC), ready to stepdown to floor  - CAP coverage with ceftriaxone/azithro  - Methylprednisolone 40 mg x5 days for possible element of COPD exacerbation  - Duonebs q4  - Hypertonic saline nebs  - Chest PT  - Suction PRN    4/21 Transfer to hospital medicine . 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  initially requiring vent but has now been stable on home trach collar for 24 hours. sats 93% on 8L via trach collar.  Awaiting cultures from sputum, on CAP coverage with ceftriaxone. completed Azithromycin. continue solumedrol 40mg IV daily.   4/22 sats 95% on trach collar 10/ fio2 60% . completed azithromycin.   started on Zosyn for possible aspiration  repeat CX ray-in the inferior hemithorax on the left side consistent with airspace consolidation/volume loss in the left lower lobe is again appreciated, but the lung zones are essentially stable since the prior exam and demonstrate no  new areas of airspace consolidation or volume loss.  respiratory culture 4/19 with pseudomonas.     4/30-  oxygen tapered to 8L/ Fio2 35% .  Needs to be at 5 liters to go home. Add guaifenisen for cough and secertion. Up in chair as much as possible  5/1 - not able to wean oxygen.  He may need a LTAC for HF oxygen.   5/3 - on 8 liters NC ( home goal is 5 liters)  5/4 - Now chronic, repeat cxr shows RLL pneumonia. Resume vanc , zosyn. Consult pulm  Up in chair helps to lower rate. He desats at night. This pt likely needs a LTAC for HF oxygen. Insurance company did not call me for a peer to peer yesterday.    5/5   sats 93 % on 8L NC/ fio2 35%. tapered to 5L/28% needs LTAC for HF oxygen.  repeated a cxray 5/4 - Interval development of increased opacification within the right lung base, may reflect developing infiltrate versus atelectasis. started antibiotics- vanc and zosyn. monitor for vancomycin toxity. plan to switch to doxycycline. Consider pulmonary eval .  just completed  course of zosyn 4/29     Goals of care, counseling/discussion  Advance Care Planning      Does patient have Capacity? Yes  Contact: Bridgett mitchell, wife  Goals/Wishes: wants to go home, HH w Home PT  Code Status- FULL  Pain management- seems comfortable without pain  Prognosis- s/p radiation, progressing cancer, high risk for aspiration, severe malnutrition  Family discussions-  4/28, 4/29 - discussed care and condition w pt and his wife.  He can talk w trach collar but sats drop.  He wants to go home. Has all oxygen supplies at home. Pt completed zosyn.   5/2 - spoke to his wife. Reviewed his care and condition. She was able to calm him down. She is encouraging him. Will start remeron for appetite and depression. She is working hard to keep him comfortable and well-cared for.  Functional Status - has trach and peg. recommended outpt f/u palliative care    Post acute care plan: pt would benefit from outpt referral to Palliative care, plan for HH and  home.    5/4 - not able to wean oxygen.  He needs a LTAC for HF oxygen. Waiting on ins approval      Hypernatremia  4/25 sodium 150. started on D5W . repeat renal panel in PM   5/4- sodium trended down to 145 -> 140, on TFs -> 138 -> 139, not receiving extra water in TFs -> 141    Hypophosphatemia  Replaced  5/4- stable      Aspiration pneumonia  4/22 sats 95% on trach collar 10/ fio2 60% . completed azithromycin.   started on Zosyn for possible aspiration  repeat CX ray-in the inferior hemithorax on the left side consistent with airspace consolidation/volume loss in the left lower lobe is again appreciated, but the lung zones are essentially stable since the prior exam and demonstrate no new areas of airspace consolidation or volume loss.  respiratory culture 4/19 with pseudomonas.   -  completed zosyn  5/4 - new infiltrate RLL. Resume zosyn, vanc, consult pulm     Sinus tachycardia        Dysphagia  Nutrition consulted. Most recent weight and BMI monitored-          Malnutrition (Moderate to Severe)  Weight Loss (Malnutrition): greater than 10% in 6 months                 Measurements:  Wt Readings from Last 1 Encounters:   05/04/22 62.4 kg (137 lb 9.1 oz)   Body mass index is 20.92 kg/m².    Recommendations: Recommendation/Intervention: 1.  Goals: Meet % EEN, EPN by RD f/u date    Patient has been screened and assessed by RD. RD will follow patient.    4/21 secondary to above. on G tube feedings   4/22 changed to bolus GT feedings.   4/29 - pt noted to sometimes refuse TFs. , on bolus TFs, no water added      Hypokalemia  replaced   5/4 - stable      Chronic respiratory failure with hypoxia, on home O2 therapy  secondary to COPD. on 5L oxygen  at home  5/4  - continue weaning efforts, currently on 8 liters  5/5  past week not able to wean oxygen.   sats 93 % on 8L NC/ fio2 35%. needs LTAC for HF oxygen.  repeated a cxray 5/4 - Interval development of increased opacification within the right lung base, may  reflect developing infiltrate versus atelectasis. started antibiotics- vanc and zosyn.  Consulted pulmonary.  just completed  course of zosyn 4/29     Protein-calorie malnutrition  Nutrition consulted. Most recent weight and BMI monitored-          Malnutrition (Moderate to Severe)  Weight Loss (Malnutrition): greater than 10% in 6 months              Measurements:  Wt Readings from Last 1 Encounters:   05/04/22 62.4 kg (137 lb 9.1 oz)   Body mass index is 20.92 kg/m².    Recommendations: Recommendation/Intervention: 1.  Goals: Meet % EEN, EPN by RD f/u date    Patient has been screened and assessed by RD. RD will follow patient.  See above      Normocytic anemia    Patient's with Normocytic anemia.. Hemoglobin stable. Etiology likely due to chronic disease .  Current CBC reviewed-    Recent Labs   Lab 05/02/22  0632 05/03/22  0457 05/04/22  0500   HGB 9.5* 9.5* 9.4*     Monitor CBC and transfuse if H/H drops below 7/21.   - improving      Other hyperlipidemia  Continue home statin         Primary hypertension  Chronic, controlled.  Latest blood pressure and vitals reviewed-   Temp:  [97.3 °F (36.3 °C)-98.5 °F (36.9 °C)]   Pulse:  []   Resp:  [17-25]   BP: ()/(64-81)   SpO2:  [89 %-97 %] .   Home meds for hypertension were reviewed- amlodipine, losartan and metoprolol  held for now  PRN meds if BP> 180/110 mm HG  5/4 - not requiring scheduled meds      Coronary artery disease involving native coronary artery of native heart without angina pectoris  Continue ASA, plavix, and statin   -stable    Squamous cell cancer of tongue    12/15/21 FNA left neck mass : squamous cell carcinoma, p16 negative  1/4/22 total glossectomy, bilateral neck dissection, bilateral cervical facial advancement flaps and anterolateral thigh free flap reconstruction of his glossectomy defect.  Final path :  [id number] (+microscopic SALINAS, single contralateral node), superior soft tissue margin of left neck with tumor.  2/28/22  start chemoradiation  Finished chemo with cisplatin 4/6. Was going to complete final dose of radiation tomorrow.     -Rad/onc consulted for radiation while inpatient  4/22  s/p radiation oncology eval -on adjuvant chemoradiation, completed 31/33 fractions of RT.  renitiation of RT  inpatient when patient able to tolerate.    4/23  completed radiation sessions  5/4 -attempting to  wean oxygen, takes 5 liters at home and has all supplies.     VTE Risk Mitigation (From admission, onward)         Ordered     enoxaparin injection 40 mg  Daily         04/19/22 1123     IP VTE HIGH RISK PATIENT  Once         04/19/22 1123     Place sequential compression device  Until discontinued         04/19/22 1123                Discharge Planning   NISA: 5/6/2022     Code Status: Full Code   Is the patient medically ready for discharge?: No    Reason for patient still in hospital (select all that apply): Treatment  Discharge Plan A: Long-term acute care facility (LTAC)   Discharge Delays: None known at this time              Jordan Armenta MD  Department of Hospital Medicine   [facility] - Telemetry Stepdown

## 2022-05-05 NOTE — ASSESSMENT & PLAN NOTE
12/15/21 FNA left neck mass : squamous cell carcinoma, p16 negative  1/4/22 total glossectomy, bilateral neck dissection, bilateral cervical facial advancement flaps and anterolateral thigh free flap reconstruction of his glossectomy defect.  Final path :  [id number] (+microscopic SALINAS, single contralateral node), superior soft tissue margin of left neck with tumor.  2/28/22 start chemoradiation  Finished chemo with cisplatin 4/6. Was going to complete final dose of radiation tomorrow.     -Rad/onc consulted for radiation while inpatient  4/22  s/p radiation oncology eval -on adjuvant chemoradiation, completed 31/33 fractions of RT.  renitiation of RT  inpatient when patient able to tolerate.    4/23  completed radiation sessions  5/4 -attempting to  wean oxygen, takes 5 liters at home and has all supplies.

## 2022-05-05 NOTE — CARE UPDATE
RAPID RESPONSE NURSE ROUND       Rounding completed with charge RN, Kirstin. No concerns verbalized at this time. Instructed to call [phone] for further concerns or assistance.

## 2022-05-05 NOTE — ASSESSMENT & PLAN NOTE
secondary to COPD. on 5L oxygen  at home  5/4  - continue weaning efforts, currently on 8 liters  5/5  past week not able to wean oxygen.   sats 93 % on 8L NC/ fio2 35%. needs LTAC for HF oxygen.  repeated a cxray 5/4 - Interval development of increased opacification within the right lung base, may reflect developing infiltrate versus atelectasis. started antibiotics- vanc and zosyn.  Consulted pulmonary.  just completed  course of zosyn 4/29

## 2022-05-05 NOTE — ASSESSMENT & PLAN NOTE
Nutrition consulted. Most recent weight and BMI monitored-          Malnutrition (Moderate to Severe)  Weight Loss (Malnutrition): greater than 10% in 6 months                 Measurements:  Wt Readings from Last 1 Encounters:   05/04/22 62.4 kg (137 lb 9.1 oz)   Body mass index is 20.92 kg/m².    Recommendations: Recommendation/Intervention: 1.  Goals: Meet % EEN, EPN by RD f/u date    Patient has been screened and assessed by RD. RD will follow patient.    4/21 secondary to above. on G tube feedings   4/22 changed to bolus GT feedings.   4/29 - pt noted to sometimes refuse TFs. , on bolus TFs, no water added

## 2022-05-05 NOTE — ASSESSMENT & PLAN NOTE
Chronic, controlled.  Latest blood pressure and vitals reviewed-   Temp:  [97.3 °F (36.3 °C)-98.5 °F (36.9 °C)]   Pulse:  []   Resp:  [17-25]   BP: ()/(64-81)   SpO2:  [89 %-97 %] .   Home meds for hypertension were reviewed- amlodipine, losartan and metoprolol  held for now  PRN meds if BP> 180/110 mm HG  5/4 - not requiring scheduled meds

## 2022-05-05 NOTE — ASSESSMENT & PLAN NOTE
Nutrition consulted. Most recent weight and BMI monitored-          Malnutrition (Moderate to Severe)  Weight Loss (Malnutrition): greater than 10% in 6 months              Measurements:  Wt Readings from Last 1 Encounters:   05/04/22 62.4 kg (137 lb 9.1 oz)   Body mass index is 20.92 kg/m².    Recommendations: Recommendation/Intervention: 1.  Goals: Meet % EEN, EPN by RD f/u date    Patient has been screened and assessed by RD. RD will follow patient.  See above

## 2022-05-05 NOTE — ASSESSMENT & PLAN NOTE
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 He is on 5L with trach collar at home. He has had increased yellow secretions, work of breathing and SOB for the past several days. BNP elevated to 1600 on admission with pulmonary edema and new cardiomegaly on CXR. Trop wnl. Bicarb is elevated suggesting chronic hypercapnia. Suctioning has cleared thick mucous. He was able to be weaned down to his home O2, however after ~ 20 minutes he desats again, requiring deep suction with resolution of hypoxia. Now stable on home O2. Diuresed -3L     -Pt stable on home O2 for 24 hours (5 liters per NC), ready to stepdown to floor  - CAP coverage with ceftriaxone/azithro  - Methylprednisolone 40 mg x5 days for possible element of COPD exacerbation  - Duonebs q4  - Hypertonic saline nebs  - Chest PT  - Suction PRN    4/21 Transfer to hospital medicine . 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  initially requiring vent but has now been stable on home trach collar for 24 hours. sats 93% on 8L via trach collar.  Awaiting cultures from sputum, on CAP coverage with ceftriaxone. completed Azithromycin. continue solumedrol 40mg IV daily.   4/22 sats 95% on trach collar 10/ fio2 60% . completed azithromycin.   started on Zosyn for possible aspiration  repeat CX ray-in the inferior hemithorax on the left side consistent with airspace consolidation/volume loss in the left lower lobe is again appreciated, but the lung zones are essentially stable since the prior exam and demonstrate no new areas of airspace consolidation or volume loss.  respiratory culture 4/19 with pseudomonas.     4/30-  oxygen tapered to 8L/ Fio2 35% .  Needs to be at 5 liters to go  home. Add guaifenisen for cough and secertion. Up in chair as much as possible  5/1 - not able to wean oxygen.  He may need a LTAC for HF oxygen.   5/3 - on 8 liters NC ( home goal is 5 liters)  5/4 - Now chronic, repeat cxr shows RLL pneumonia. Resume vanc , zosyn. Consult pulm  Up in chair helps to lower rate. He desats at night. This pt likely needs a LTAC for HF oxygen. Insurance company did not call me for a peer to peer yesterday.    5/5   sats 93 % on 8L NC/ fio2 35%. tapered to 5L/28% needs LTAC for HF oxygen.  repeated a cxray 5/4 - Interval development of increased opacification within the right lung base, may reflect developing infiltrate versus atelectasis. started antibiotics- vanc and zosyn. monitor for vancomycin toxity. plan to switch to doxycycline. Consider pulmonary eval .  just completed  course of zosyn 4/29

## 2022-05-06 LAB
ALBUMIN SERPL BCP-MCNC: 2.4 G/DL (ref 3.5–5.2)
ALP SERPL-CCNC: 70 U/L (ref 55–135)
ALT SERPL W/O P-5'-P-CCNC: 11 U/L (ref 10–44)
ANION GAP SERPL CALC-SCNC: 7 MMOL/L (ref 8–16)
AST SERPL-CCNC: 12 U/L (ref 10–40)
BASOPHILS # BLD AUTO: 0.02 K/UL (ref 0–0.2)
BASOPHILS NFR BLD: 0.4 % (ref 0–1.9)
BILIRUB SERPL-MCNC: 0.2 MG/DL (ref 0.1–1)
BUN SERPL-MCNC: 16 MG/DL (ref 8–23)
CALCIUM SERPL-MCNC: 8.9 MG/DL (ref 8.7–10.5)
CHLORIDE SERPL-SCNC: 100 MMOL/L (ref 95–110)
CO2 SERPL-SCNC: 31 MMOL/L (ref 23–29)
CREAT SERPL-MCNC: 0.7 MG/DL (ref 0.5–1.4)
DIFFERENTIAL METHOD: ABNORMAL
EOSINOPHIL # BLD AUTO: 0.1 K/UL (ref 0–0.5)
EOSINOPHIL NFR BLD: 2.8 % (ref 0–8)
ERYTHROCYTE [DISTWIDTH] IN BLOOD BY AUTOMATED COUNT: 16.6 % (ref 11.5–14.5)
EST. GFR  (AFRICAN AMERICAN): >60 ML/MIN/1.73 M^2
EST. GFR  (NON AFRICAN AMERICAN): >60 ML/MIN/1.73 M^2
GLUCOSE SERPL-MCNC: 126 MG/DL (ref 70–110)
HCT VFR BLD AUTO: 29.7 % (ref 40–54)
HGB BLD-MCNC: 9 G/DL (ref 14–18)
IMM GRANULOCYTES # BLD AUTO: 0.02 K/UL (ref 0–0.04)
IMM GRANULOCYTES NFR BLD AUTO: 0.4 % (ref 0–0.5)
LYMPHOCYTES # BLD AUTO: 0.6 K/UL (ref 1–4.8)
LYMPHOCYTES NFR BLD: 12.1 % (ref 18–48)
MAGNESIUM SERPL-MCNC: 2 MG/DL (ref 1.6–2.6)
MCH RBC QN AUTO: 29.7 PG (ref 27–31)
MCHC RBC AUTO-ENTMCNC: 30.3 G/DL (ref 32–36)
MCV RBC AUTO: 98 FL (ref 82–98)
MONOCYTES # BLD AUTO: 0.6 K/UL (ref 0.3–1)
MONOCYTES NFR BLD: 12.8 % (ref 4–15)
NEUTROPHILS # BLD AUTO: 3.3 K/UL (ref 1.8–7.7)
NEUTROPHILS NFR BLD: 71.5 % (ref 38–73)
NRBC BLD-RTO: 0 /100 WBC
PHOSPHATE SERPL-MCNC: 3.4 MG/DL (ref 2.7–4.5)
PLATELET # BLD AUTO: 222 K/UL (ref 150–450)
PMV BLD AUTO: 9.5 FL (ref 9.2–12.9)
POTASSIUM SERPL-SCNC: 4 MMOL/L (ref 3.5–5.1)
PROT SERPL-MCNC: 6.1 G/DL (ref 6–8.4)
RBC # BLD AUTO: 3.03 M/UL (ref 4.6–6.2)
SODIUM SERPL-SCNC: 138 MMOL/L (ref 136–145)
WBC # BLD AUTO: 4.62 K/UL (ref 3.9–12.7)

## 2022-05-06 PROCEDURE — 94640 AIRWAY INHALATION TREATMENT: CPT

## 2022-05-06 PROCEDURE — 80053 COMPREHEN METABOLIC PANEL: CPT | Performed by: HOSPITALIST

## 2022-05-06 PROCEDURE — 25000242 PHARM REV CODE 250 ALT 637 W/ HCPCS: Performed by: STUDENT IN AN ORGANIZED HEALTH CARE EDUCATION/TRAINING PROGRAM

## 2022-05-06 PROCEDURE — 83735 ASSAY OF MAGNESIUM: CPT | Performed by: HOSPITALIST

## 2022-05-06 PROCEDURE — 36415 COLL VENOUS BLD VENIPUNCTURE: CPT | Performed by: HOSPITALIST

## 2022-05-06 PROCEDURE — 63600175 PHARM REV CODE 636 W HCPCS: Performed by: INTERNAL MEDICINE

## 2022-05-06 PROCEDURE — 25000003 PHARM REV CODE 250: Performed by: STUDENT IN AN ORGANIZED HEALTH CARE EDUCATION/TRAINING PROGRAM

## 2022-05-06 PROCEDURE — 94761 N-INVAS EAR/PLS OXIMETRY MLT: CPT

## 2022-05-06 PROCEDURE — 99232 SBSQ HOSP IP/OBS MODERATE 35: CPT | Mod: ,,, | Performed by: HOSPITALIST

## 2022-05-06 PROCEDURE — 99232 PR SUBSEQUENT HOSPITAL CARE,LEVL II: ICD-10-PCS | Mod: ,,, | Performed by: HOSPITALIST

## 2022-05-06 PROCEDURE — 99900035 HC TECH TIME PER 15 MIN (STAT)

## 2022-05-06 PROCEDURE — 25000242 PHARM REV CODE 250 ALT 637 W/ HCPCS: Performed by: INTERNAL MEDICINE

## 2022-05-06 PROCEDURE — 85025 COMPLETE CBC W/AUTO DIFF WBC: CPT | Performed by: HOSPITALIST

## 2022-05-06 PROCEDURE — 27000221 HC OXYGEN, UP TO 24 HOURS

## 2022-05-06 PROCEDURE — 63600175 PHARM REV CODE 636 W HCPCS: Performed by: HOSPITALIST

## 2022-05-06 PROCEDURE — 99900026 HC AIRWAY MAINTENANCE (STAT)

## 2022-05-06 PROCEDURE — 20600001 HC STEP DOWN PRIVATE ROOM

## 2022-05-06 PROCEDURE — 25000003 PHARM REV CODE 250: Performed by: HOSPITALIST

## 2022-05-06 PROCEDURE — 84100 ASSAY OF PHOSPHORUS: CPT | Performed by: HOSPITALIST

## 2022-05-06 RX ADMIN — GUAIFENESIN 200 MG: 100 SOLUTION ORAL at 09:05

## 2022-05-06 RX ADMIN — IPRATROPIUM BROMIDE AND ALBUTEROL SULFATE 3 ML: 2.5; .5 SOLUTION RESPIRATORY (INHALATION) at 08:05

## 2022-05-06 RX ADMIN — ASPIRIN 81 MG CHEWABLE TABLET 81 MG: 81 TABLET CHEWABLE at 08:05

## 2022-05-06 RX ADMIN — CLOPIDOGREL 75 MG: 75 TABLET, FILM COATED ORAL at 08:05

## 2022-05-06 RX ADMIN — IPRATROPIUM BROMIDE AND ALBUTEROL SULFATE 3 ML: 2.5; .5 SOLUTION RESPIRATORY (INHALATION) at 03:05

## 2022-05-06 RX ADMIN — IPRATROPIUM BROMIDE AND ALBUTEROL SULFATE 3 ML: 2.5; .5 SOLUTION RESPIRATORY (INHALATION) at 04:05

## 2022-05-06 RX ADMIN — VANCOMYCIN HYDROCHLORIDE 1000 MG: 1 INJECTION, POWDER, LYOPHILIZED, FOR SOLUTION INTRAVENOUS at 03:05

## 2022-05-06 RX ADMIN — PIPERACILLIN SODIUM AND TAZOBACTAM SODIUM 4.5 G: 4; .5 INJECTION, POWDER, LYOPHILIZED, FOR SOLUTION INTRAVENOUS at 07:05

## 2022-05-06 RX ADMIN — IPRATROPIUM BROMIDE AND ALBUTEROL SULFATE 3 ML: 2.5; .5 SOLUTION RESPIRATORY (INHALATION) at 01:05

## 2022-05-06 RX ADMIN — GLYCOPYRROLATE 2 MG: 1 LIQUID ORAL at 09:05

## 2022-05-06 RX ADMIN — GLYCOPYRROLATE 2 MG: 1 LIQUID ORAL at 08:05

## 2022-05-06 RX ADMIN — ATORVASTATIN CALCIUM 20 MG: 20 TABLET, FILM COATED ORAL at 08:05

## 2022-05-06 RX ADMIN — FOLIC ACID 1 MG: 1 TABLET ORAL at 08:05

## 2022-05-06 RX ADMIN — ENOXAPARIN SODIUM 40 MG: 40 INJECTION SUBCUTANEOUS at 05:05

## 2022-05-06 RX ADMIN — THIAMINE HCL TAB 100 MG 100 MG: 100 TAB at 08:05

## 2022-05-06 RX ADMIN — IPRATROPIUM BROMIDE AND ALBUTEROL SULFATE 3 ML: 2.5; .5 SOLUTION RESPIRATORY (INHALATION) at 11:05

## 2022-05-06 RX ADMIN — MELATONIN TAB 3 MG 6 MG: 3 TAB at 09:05

## 2022-05-06 NOTE — PLAN OF CARE
Problem: Adult Inpatient Plan of Care  Goal: Plan of Care Review  Outcome: Ongoing, Progressing  Goal: Optimal Comfort and Wellbeing  Outcome: Ongoing, Progressing     Problem: Skin Injury Risk Increased  Goal: Skin Health and Integrity  Outcome: Ongoing, Progressing     Problem: Communication Impairment (Artificial Airway)  Goal: Effective Communication  Outcome: Ongoing, Progressing       AAOx4 during shift. Up to chair with minimal assist. Tolerating PEG feedings.  Denies pain.  RT completed resp treatment around 5PM, tube feeding given.  After completion of feeding, O2 sats noted to be below 90%. HR with spikes up in the 180's to as high as 227, but immediately returned to baseline.  Pt then reports feeling cold and began shaking severely.  Charge RN and RT called to bedside.  After RT provided suction and lavage, patient returned to normal, with pulse in the low 100's.  MD notified, as well as Rapid Response.  New orders placed for stat EKG.

## 2022-05-06 NOTE — ASSESSMENT & PLAN NOTE
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 He is on 5L with trach collar at home. He has had increased yellow secretions, work of breathing and SOB for the past several days. BNP elevated to 1600 on admission with pulmonary edema and new cardiomegaly on CXR. Trop wnl. Bicarb is elevated suggesting chronic hypercapnia. Suctioning has cleared thick mucous. He was able to be weaned down to his home O2, however after ~ 20 minutes he desats again, requiring deep suction with resolution of hypoxia. Now stable on home O2. Diuresed -3L     -Pt stable on home O2 for 24 hours (5 liters per NC), ready to stepdown to floor  - CAP coverage with ceftriaxone/azithro  - Methylprednisolone 40 mg x5 days for possible element of COPD exacerbation  - Duonebs q4  - Hypertonic saline nebs  - Chest PT  - Suction PRN    4/21 Transfer to hospital medicine . 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  initially requiring vent but has now been stable on home trach collar for 24 hours. sats 93% on 8L via trach collar.  Awaiting cultures from sputum, on CAP coverage with ceftriaxone. completed Azithromycin. continue solumedrol 40mg IV daily.   4/22 sats 95% on trach collar 10/ fio2 60% . completed azithromycin.   started on Zosyn for possible aspiration  repeat CX ray-in the inferior hemithorax on the left side consistent with airspace consolidation/volume loss in the left lower lobe is again appreciated, but the lung zones are essentially stable since the prior exam and demonstrate no new areas of airspace consolidation or volume loss.  respiratory culture 4/19 with pseudomonas.     4/30-  oxygen tapered to 8L/ Fio2 35% .  Needs to be at 5 liters to go  home. Add guaifenisen for cough and secertion. Up in chair as much as possible  5/1 - not able to wean oxygen.  He may need a LTAC for HF oxygen.   5/3 - on 8 liters NC ( home goal is 5 liters)  5/4 - Now chronic, repeat cxr shows RLL pneumonia. Resume vanc , zosyn. Consult pulm  Up in chair helps to lower rate. He desats at night. This pt likely needs a LTAC for HF oxygen. Insurance company did not call me for a peer to peer yesterday.    5/5   sats 93 % on 8L NC/ fio2 35%. tapered to 5L/28% needs LTAC for HF oxygen.  repeated a cxray 5/4 - Interval development of increased opacification within the right lung base, may reflect developing infiltrate versus atelectasis. started antibiotics- vanc and zosyn. monitor for vancomycin toxity. plan to switch to doxycycline. Consider pulmonary eval .  just completed  course of zosyn 4/29 5/6 tolerated 6L NC/ fio2 35% overnight . peer to peer with insurance today. EKG with sinus tachycardia. discussed [facility] pulmonology. monitor off antibiotics zosyn and vancomcyn as he completed adequate course of antibiotics for pseudomas and aspiration. CX ray improvement may lag behind treatment   5/12- stable on 5L/ fio2 28% overnight.  Rehab acceptance and ins approval pending

## 2022-05-06 NOTE — PLAN OF CARE
Problem: Adult Inpatient Plan of Care  Goal: Plan of Care Review  Outcome: Ongoing, Progressing  Goal: Patient-Specific Goal (Individualized)  Outcome: Ongoing, Progressing  Goal: Absence of Hospital-Acquired Illness or Injury  Outcome: Ongoing, Progressing  Goal: Optimal Comfort and Wellbeing  Outcome: Ongoing, Progressing  Goal: Readiness for Transition of Care  Outcome: Ongoing, Progressing     Problem: Impaired Wound Healing  Goal: Optimal Wound Healing  Outcome: Ongoing, Progressing     Problem: Skin Injury Risk Increased  Goal: Skin Health and Integrity  Outcome: Ongoing, Progressing     Problem: Communication Impairment (Artificial Airway)  Goal: Effective Communication  Outcome: Ongoing, Progressing     Problem: Device-Related Complication Risk (Artificial Airway)  Goal: Optimal Device Function  Outcome: Ongoing, Progressing     Problem: Skin and Tissue Injury (Artificial Airway)  Goal: Absence of Device-Related Skin or Tissue Injury  Outcome: Ongoing, Progressing     Problem: Noninvasive Ventilation Acute  Goal: Effective Unassisted Ventilation and Oxygenation  Outcome: Ongoing, Progressing     Problem: Fall Injury Risk  Goal: Absence of Fall and Fall-Related Injury  Outcome: Ongoing, Progressing    Pt up in chair, Aox4, able to communicate by writing needs down and doing hand gestures, continues on trach collar, no distress noted, no c/o pain, continues on bolus feedings, tolerating well, no residual noted, educated on POC, understanding gesture noted, chair locked, call light and suction with in reach, instructed to call when assistance needed.

## 2022-05-06 NOTE — CONSULTS
Therapy with Vancomycin complete and/or consult discontinued by provider.  Pharmacy will sign off, please re-consult as needed.    Thank you,  Radha Loya, PharmD, [facility]  Internal Medicine Clinical Pharmacy Specialist   Ext [phone]

## 2022-05-06 NOTE — NURSING
2000  Received report for pt from AM Nurse. AAOx4. Trach; 6L 35% FIO2. No c/o pain. Tele box in place. Education regarding fall and safety precaution provided. Safety check performed. Call bell within reach. Will continue to monitor.     0000  Pt resting in bed. Medication administered per MAR. Safety check performed. Call bell within reach. Will continue to monitor.

## 2022-05-06 NOTE — PROGRESS NOTES
[facility] - Telemetry [facility]  Progress Note    Patient Name: Fareed Richard Jr.  MRN: [id number]  Patient Class: IP- Inpatient   Admission Date: 4/19/2022  Length of Stay: 17 days  Attending Physician: Jordan Armenta MD  Primary Care Provider: Kodi Tubbs MD        Subjective:     Principal Problem:Acute on chronic respiratory failure        HPI:  Mr. 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 Per wife he has had several days of increasing lethargy, increased work of breathing and secretions. Yesterday she became particularly concerned when he became slightly less responsive and interactive. Wife also notes increased yellow thick secretions from trach site. Upon EMS arrival he was on his home 5 L by trach collar and saturating 93% with significant wheezing.  He received 1 DuoNeb by them.  He was also suctioned.     Currently patient is alert, following commands.  He is responding to yes no questions.  He denies any chest pain but does endorse shortness of breath and cough.  He denies any abdominal pain or pain elsewhere       ED course:   He was given 500ccs IVF as well as one time doses of vanc/cefepime/azithro. Labs significant for BNP of 1600, WBC 5k, K 5.6, Bicarb 35, VBG 7.26/87/25/40. Trop wnl. CXR with new cardiomegaly and pulmonary edema. Suctioned with return of copious secretions. ICU was consulted for acute hypoxemic respiratory failure and he will be admitted for further management.              Overview/Hospital Course:    Admitted to ICU, started on CAP coverage and solumedrol. Quickly weaned off vent after return of copious secretions on suction. Has been maintaining sats well on home O2. Rad/onc consulted for completion of radiology while inpatient. Pending culture sensitivity.    4/21 Transfer to hospital  medicine . 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  initially requiring vent but has now been stable on home trach collar for 24 hours. sats 93% on 8L via trach collar.  Awaiting cultures from sputum, on CAP coverage with ceftriaxone. completed Azithromycin. continue solumedrol 40mg IV daily.  rad/onc consulted this morning-he was supposed to complete radiation outpt but was admitted, attempting to set up for inpatient  4/22 changed to bolus GT feedings. s/p radiation oncology eval -on adjuvant chemoradiation, completed 31/33 fractions of RT.  renitiation of RT  inpatient when patient able to tolerate. sats 95% on trach collar 10/ fio2 60% . completed azithromycin.  started on Zosyn for possible aspiration  repeat CX ray-in the inferior hemithorax on the left side consistent with airspace consolidation/volume loss in the left lower lobe is again appreciated, but the lung zones are essentially stable since the prior exam and demonstrate no new areas of airspace consolidation or volume loss. respiratory culture 4/19 with pansensitive  pseudomonas.     4/23 intermittently refusing bolus GT feedings.stable on 10L/60% Fio2 . completed radiation sessions. off solumedrol  today. PT recs SNF  4/24 K and P replaced   4/25 sodium 150. started on D5W . repeat renal panel in PM . oxygen tapered to 8L/ Fio2 35%   4/26 K of 3.2  replaced   4/27 stable on 8L/ Fio2 35% . mucous plug remvove by suction , wants glycopyrrolate PRN due to dry mouth   Interval History:   4/28 - has trach, G tube, NPO, completed radiation treatments, 92% on 8 liters,+ stool and urinations. Weaning oxygen. Wants to go home w HH. Discussed his care and condition with his wife.    4/29 - still requiring 8 liters per NC. Sats low 90s. I lowered oxygen to 5 liters while in the room and he tolerated it.  Keep weaning.   4/30 - back up  to 8 liters per NC, refused some Bolus TFs yesterday  5/1 - not able to wean oxygen.  He may need a LTAC for HF oxygen. Otherwise, progressing,. Sat up in chair yesterday, + urinations and BM.   5/2 - on 8 liters per NC this am. 92%.  His home goal is 5 liters. Pt tends to need more at night.  Plan is to keep him overnight on 5 liters to make sure he is safe for home oxygen.  He may benefit from LTAC a few days. Accepted to LTAC, waiting on ins auth.  Needs PT/OT for deconditioning.  5/3 -  8 liters, up in chair a am, monitor % meals, on nebs q 4 hours. PT/OT, trach care and suctioning. LTAC referrals sent.   5/4 - nurses unable to keep flow rate below 8 overnight, despite coaching. His volume status is stable. Repeat cxr. - Interval development of increased opacification within the right lung base, may reflect developing infiltrate versus atelectasis.  Small bilateral pleural effusions, increased on the left as compared to prior.  5/5  past week not able to wean oxygen.   sats 93 % on 8L NC/ fio2 35%. tapered to 5L/28% needs LTAC for HF oxygen.  repeated a cxray 5/4 - Interval development of increased opacification within the right lung base, may reflect developing infiltrate versus atelectasis. started antibiotics- vanc and zosyn.  Consider pulmonary eval .  just completed  course of zosyn 4/29 5/6 tolerated 6L NC/ fio2 35% overnight . peer to peer with blanca today. EKG with sinus tachycardia   discussed [facility] pulmonology. monitor off antibiotics zosyn and vancomcyn as he completed adequate course of antibiotics for pseudomas and aspiration. CX ray improvement may lag behind treatment          Review of Systems:   Pain scale:   Constitutional:  fever,  chills, headache, vision loss, hearing loss, weight loss, Generalized weakness, falls, loss of smell, loss of taste, poor appetite,  sore throat, voice change,immunocompromised state.   Respiratory: cough, shortness of breath.   Cardiovascular: chest pain,  dizziness, palpitations, orthopnea, swelling of feet, syncope  Gastrointestinal: nausea, vomiting, abdominal pain, diarrhea, black stool,  blood in stool, change in bowel habits  Genitourinary: hematuria, dysuria, urgency, frequency  Integument/Breast: rash,  pruritis  Hematologic/Lymphatic: easy bruising, lymphadenopathy  Musculoskeletal: arthralgias , myalgias, back pain, neck pain, knee pain  Neurological: confusion, seizures, tremors, slurred speech  Behavioral/Psych:  depression, anxiety, auditory or visual hallucinations        OBJECTIVE:     Physical Exam:  Body mass index is 20.92 kg/m².       Constitutional: Appears  thin built   Head: Normocephalic and atraumatic.   Neck: Normal range of motion. Neck supple. trach collar  Cardiovascular: Normal heart rate.  Regular heart rhythm.  Pulmonary/Chest: Effort normal.   Abdominal: No distension.  No tenderness, PEG tube insitu   Musculoskeletal: Normal range of motion. No edema.   Neurological: Alert and oriented to person, place, and time.   Skin: Skin is warm and dry.   Psychiatric: Normal mood and affect. Behavior is normal.       Vital Signs  Temp: 98.1 °F (36.7 °C) (05/06/22 1551)  Pulse: 103 (05/06/22 1551)  Resp: 20 (05/06/22 1551)  BP: 127/75 (05/06/22 1551)  SpO2: (Abnormal) 86 % (05/06/22 1551)     24 Hour VS Range    Temp:  [97.6 °F (36.4 °C)-98.3 °F (36.8 °C)]   Pulse:  []   Resp:  [16-26]   BP: (127-156)/(72-84)   SpO2:  [86 %-99 %]     Intake/Output Summary (Last 24 hours) at 5/6/2022 1737  Last data filed at 5/6/2022 1054  Gross per 24 hour   Intake 750 ml   Output 975 ml   Net -225 ml         I/O This Shift:  I/O this shift:  In: 750 [NG/GT:750]  Out: 650 [Urine:650]    Wt Readings from Last 3 Encounters:   05/04/22 62.4 kg (137 lb 9.1 oz)   04/06/22 63 kg (138 lb 14.2 oz)   04/04/22 63 kg (139 lb)       I have personally reviewed the vitals and recorded Intake/Output     Laboratory/Diagnostic Data:    CBC/Anemia Labs: Coags:    Recent Labs    Lab 05/04/22  0500 05/05/22  0630 05/06/22  0509   WBC 5.00 4.36 4.62   HGB 9.4* 8.9* 9.0*   HCT 30.6* 36.5* 29.7*    238 222   MCV 95 125* 98   RDW 16.5* 14.6* 16.6*    No results for input(s): PT, INR, APTT in the last 168 hours.     Chemistries: ABG:   Recent Labs   Lab 05/04/22  0500 05/05/22  0630 05/06/22  0509    140 138   K 4.0 4.0 4.0    101 100   CO2 29 31* 31*   BUN 19 20 16   CREATININE 0.6 0.7 0.7   CALCIUM 9.4 8.8 8.9   PROT 6.2 5.6* 6.1   BILITOT 0.2 0.2 0.2   ALKPHOS 70 68 70   ALT 11 9* 11   AST 13 11 12   MG 2.1 2.1 2.0   PHOS 4.2 4.5 3.4    No results for input(s): PH, PCO2, PO2, HCO3, POCSATURATED, BE in the last 168 hours.     POCT Glucose: HbA1c:    No results for input(s): POCTGLUCOSE in the last 168 hours. No results found for: HGBA1C     Cardiac Enzymes: Ejection Fractions:    No results for input(s): CPK, CPKMB, MB, TROPONINI in the last 72 hours. EF   Date Value Ref Range Status   04/19/2022 60 % Final   12/30/2021 65 % Final          No results for input(s): COLORU, APPEARANCEUA, PHUR, SPECGRAV, PROTEINUA, GLUCUA, KETONESU, BILIRUBINUA, OCCULTUA, NITRITE, UROBILINOGEN, LEUKOCYTESUR, RBCUA, WBCUA, BACTERIA, SQUAMEPITHEL, HYALINECASTS in the last 48 hours.    Invalid input(s): WRIGHTSUR    Procalcitonin (ng/mL)   Date Value   04/19/2022 0.09     Lactate (Lactic Acid) (mmol/L)   Date Value   04/19/2022 1.0     BNP (pg/mL)   Date Value   04/22/2022 132 (H)   04/19/2022 1,607 (H)   03/21/2022 242 (H)   11/02/2019 260 (H)   12/27/2018 239 (H)     No results found for: CRP, SEDRATE  No results found for: DDIMER  Ferritin (ng/mL)   Date Value   02/16/2022 135     No results found for: LDH  Troponin I (ng/mL)   Date Value   04/19/2022 0.018   11/02/2019 0.016   12/27/2018 <0.006     No results found for this or any previous visit.  SARS-CoV2 (COVID-19) Qualitative PCR (no units)   Date Value   05/02/2022 Not Detected   12/31/2021 Not Detected     SARS-CoV-2 RNA, Amplification,  Qual (no units)   Date Value   01/13/2022 Negative   01/03/2022 Negative     POC Rapid COVID (no units)   Date Value   04/19/2022 Negative       Microbiology labs for the last week  Microbiology Results (last 7 days)     Procedure Component Value Units Date/Time    Culture, Respiratory with Gram Stain [[id number]]     Order Status: No result Specimen: Respiratory           Reviewed and noted in plan where applicable- Please see chart for full lab data.    Lines/Drains:       Peripheral IV - Single Lumen 04/25/22 2300 22 G Anterior;Distal;Left Upper Arm (Active)   Site Assessment Clean;Dry;Intact 05/05/22 0318   Extremity Assessment Distal to IV No abnormal discoloration 05/04/22 1100   Line Status Saline locked 05/04/22 1100   Dressing Status Clean;Dry;Intact 05/04/22 1100   Dressing Intervention Integrity maintained 05/04/22 1100   Dressing Change Due 04/28/22 04/28/22 0912   Site Change Due 04/26/22 04/28/22 0912   Reason Not Rotated Poor venous access 05/03/22 0735   Number of days: 9            Gastrostomy/Enterostomy 01/04/22 Percutaneous endoscopic gastrostomy (PEG) LUQ (Active)   Securement secured to abdomen 05/04/22 1100   Interventions Prior to Feeding patency checked;residual checked 05/04/22 1100   Feeding Type bolus 05/04/22 1100   Clamp Status/Tolerance clamped;no abdominal discomfort;no abdominal distention;no emesis;no nausea;no residual;no restlessness 05/04/22 1100   Feeding Action feeding continued 05/03/22 0735   Dressing dry and intact 05/04/22 1100   Insertion Site no redness;no warmth;no drainage;no tenderness;no swelling 05/04/22 1100   Site Care secured w/ tape 05/04/22 1100   Flush/Irrigation flushed w/;water;no resistance met 05/04/22 1100   Dressing Change Due 04/28/22 04/28/22 0912   Intake (mL) 60 mL 04/30/22 1250   Water Bolus (mL) 50 mL 05/03/22 1700   Tube Output(mL)(Include Discarded Residual) 0 mL 04/28/22 1700   Formula Name Isosource 05/03/22 0735   Tube Feeding Intake (mL) 375  05/03/22 1700   Residual Amount (ml) 0 ml 05/04/22 1100   Length Of Feeding (Min) 15 04/28/22 0912   Number of days: 121       Imaging  ECG Results          EKG 12-lead (Final result)  Result time 04/19/22 17:42:02    Final result by Interface, Lab In [facility] (04/19/22 17:42:02)             Narrative:    Test Reason : R06.02,    Vent. Rate : 081 BPM     Atrial Rate : 081 BPM     P-R Int : 158 ms          QRS Dur : 092 ms      QT Int : 366 ms       P-R-T Axes : 061 -70 045 degrees     QTc Int : 425 ms    Normal sinus rhythm  Left axis deviation Now present  Incomplete right bundle branch block  Abnormal ECG  When compared with ECG of 10-MADONNA-2022 10:11,    Confirmed by DIONNE SINGH MD ([id number]) on 4/19/2022 5:41:52 PM    Referred By: [email or web address]   SELF           Confirmed By:DIONNE SINGH MD                            Results for orders placed during the hospital encounter of 04/19/22    Echo    Interpretation Summary  · The left ventricle is normal in size with normal systolic function. The estimated ejection fraction is 60%.  · Normal right ventricular size with normal right ventricular systolic function.  · Normal left ventricular diastolic function.  · The aortic root is mildly dilated.  · Mechanically ventilated; cannot use inferior caval vein diameter to estimate central venous pressure. The IVC is not enlarged and does collapse somewhat with the respiratory cycle, essentially ruling out venous hypertension.      X-Ray Chest AP Portable  Narrative: EXAMINATION:  XR CHEST AP PORTABLE    CLINICAL HISTORY:  hypoxia;    TECHNIQUE:  Single frontal view of the chest was performed.    COMPARISON:  Chest radiograph 04/22/2022, 04/21/2022, 04/19/2022.    FINDINGS:  Monitoring leads overlie the thorax.  There is a tracheostomy cannula in stable positioning.  The trachea is midline.  Cardiomediastinal silhouette is stable.  There is aortic plaque.  There are small bilateral pleural effusions, probably increased on the left.   Additionally, there is increased opacification over the right lung base, which may reflect atelectasis or developing infiltrate.  No pneumothorax.  The osseous structures appear intact.    There are postoperative changes in the right axillary region there is no evidence of free air beneath the hemidiaphragms.  Impression: Interval development of increased opacification within the right lung base, may reflect developing infiltrate versus atelectasis.    Small bilateral pleural effusions, increased on the left as compared to prior.    Electronically signed by resident: Tim Aparicio  Date:    05/04/2022  Time:    08:52    Electronically signed by: Daniel Álvarez MD  Date:    05/04/2022  Time:    09:12      Labs, Imaging, EKG and Diagnostic results from 5/6/2022 were reviewed.    Medications:  Medication list was reviewed and changes noted under Assessment/Plan.  No current facility-administered medications on file prior to encounter.     Current Outpatient Medications on File Prior to Encounter   Medication Sig Dispense Refill    amLODIPine (NORVASC) 10 MG tablet Take 10 mg by mouth once daily.      atorvastatin (LIPITOR) 20 MG tablet 1 tablet (20 mg total) by Per G Tube route once daily. 90 tablet 3    glycopyrrolate (CUVPOSA) 1 mg/5 mL (0.2 mg/mL) Soln Take 5 mLs (1 mg total) by mouth 3 (three) times daily as needed (TO REDUCE SECRETIONS). 473 mL 1    acetaminophen (TYLENOL) 325 MG tablet 2 tablets (650 mg total) by Per G Tube route every 6 (six) hours.  0    albuterol-ipratropium (DUO-NEB) 2.5 mg-0.5 mg/3 mL nebulizer solution Take 3 mLs by nebulization every 4 (four) hours as needed for Wheezing. Rescue 75 mL 0    aspirin 81 MG Chew 1 tablet (81 mg total) by Per G Tube route once daily.  0    bisacodyL (DULCOLAX) 10 mg Supp Place 1 suppository (10 mg total) rectally daily as needed.  0    clopidogreL (PLAVIX) 75 mg tablet 1 tablet (75 mg total) by Per G Tube route once daily. 30 tablet 11    duke's soln  (benadryl 30 mL, mylanta 30 mL, LIDOcaine 30 mL, nystatin 30 mL) 120mL Take 10 mLs by mouth 4 (four) times daily as needed (mucositis). 360 mL 0    enoxaparin (LOVENOX) 40 mg/0.4 mL Syrg Inject 0.4 mLs (40 mg total) into the skin once daily.      folic acid (FOLVITE) 1 MG tablet 1 tablet (1 mg total) by Per G Tube route once daily.  0    guaiFENesin 200 mg/5 mL Liqd Take 400 mg by mouth every 4 (four) hours as needed (for secretions). 473 mL 3    losartan (COZAAR) 50 MG tablet 1 tablet (50 mg total) by Per G Tube route once daily. 90 tablet 3    melatonin (MELATIN) 3 mg tablet 2 tablets (6 mg total) by Per G Tube route nightly.  0    metoprolol tartrate (LOPRESSOR) 100 MG tablet 1 tablet (100 mg total) by Per G Tube route 2 (two) times daily. 60 tablet 11    ondansetron (ZOFRAN-ODT) 8 MG TbDL Take 1 tablet (8 mg total) by mouth every 8 (eight) hours as needed (nausea). 30 tablet 1    oxyCODONE (ROXICODONE) 5 mg/5 mL Soln 5 mLs (5 mg total) by Per G Tube route every 4 (four) hours as needed (Pain). 150 mL 0    polyethylene glycol (GLYCOLAX) 17 gram PwPk 17 g by Per G Tube route 2 (two) times daily.  0    senna-docusate 8.6-50 mg (PERICOLACE) 8.6-50 mg per tablet 1 tablet by Per G Tube route 2 (two) times daily.      sodium chloride (OCEAN) 0.65 % nasal spray 1 spray by Nasal route as needed for Congestion.  12    sodium chloride 3% 3 % nebulizer solution Take 4 mLs by nebulization every 8 (eight) hours.      thiamine 100 MG tablet 1 tablet (100 mg total) by Per G Tube route once daily.       Scheduled Medications:  albuterol-ipratropium, 3 mL, Nebulization, Q4H  aspirin, 81 mg, Per G Tube, Daily  atorvastatin, 20 mg, Per G Tube, Daily  clopidogreL, 75 mg, Per G Tube, Daily  enoxaparin, 40 mg, Subcutaneous, Daily  folic acid, 1 mg, Per G Tube, Daily  guaiFENesin 100 mg/5 ml, 200 mg, Per G Tube, Q8H  melatonin, 6 mg, Per G Tube, Nightly  polyethylene glycol, 17 g, Per G Tube, BID  scopolamine, 1 patch,  Transdermal, Q3 Days  thiamine, 100 mg, Per G Tube, Daily      PRN: sodium chloride, albuterol-ipratropium, bisacodyL, glycopyrrolate, ondansetron, oxyCODONE, sodium chloride, sodium chloride 0.9%  Infusions:   Estimated Creatinine Clearance: 83 mL/min (based on SCr of 0.7 mg/dL).    Assessment/Plan:      * Acute on chronic respiratory failure  Pt with SCC of the tongue s/p total glossectomy, chemowith cisplatin, and nearing completion of radiation with trach, COPD, asthma, HTN, CAD s/p PCI presenting for acute on chronic hypoxemic respiratory failure. He is on 5L with trach collar at home. He has had increased yellow secretions, work of breathing and SOB for the past several days. BNP elevated to 1600 on admission with pulmonary edema and new cardiomegaly on CXR. Trop wnl. Bicarb is elevated suggesting chronic hypercapnia. Suctioning has cleared thick mucous. He was able to be weaned down to his home O2, however after ~ 20 minutes he desats again, requiring deep suction with resolution of hypoxia. Now stable on home O2. Diuresed -3L     -Pt stable on home O2 for 24 hours (5 liters per NC), ready to stepdown to floor  - CAP coverage with ceftriaxone/azithro  - Methylprednisolone 40 mg x5 days for possible element of COPD exacerbation  - Duonebs q4  - Hypertonic saline nebs  - Chest PT  - Suction PRN    4/21 Transfer to hospital medicine . 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  initially requiring vent but has now been stable on home trach collar for 24 hours. sats 93% on 8L via trach collar.  Awaiting cultures from sputum, on CAP coverage with ceftriaxone. completed Azithromycin. continue solumedrol 40mg IV daily.   4/22 sats 95% on trach collar 10/ fio2 60% . completed azithromycin.   started on Zosyn for possible aspiration  repeat CX ray-in the inferior hemithorax on the left side  consistent with airspace consolidation/volume loss in the left lower lobe is again appreciated, but the lung zones are essentially stable since the prior exam and demonstrate no new areas of airspace consolidation or volume loss.  respiratory culture 4/19 with pseudomonas.     4/30-  oxygen tapered to 8L/ Fio2 35% .  Needs to be at 5 liters to go home. Add guaifenisen for cough and secertion. Up in chair as much as possible  5/1 - not able to wean oxygen.  He may need a LTAC for HF oxygen.   5/3 - on 8 liters NC ( home goal is 5 liters)  5/4 - Now chronic, repeat cxr shows RLL pneumonia. Resume vanc , zosyn. Consult pulm  Up in chair helps to lower rate. He desats at night. This pt likely needs a LTAC for HF oxygen. Insurance company did not call me for a peer to peer yesterday.    5/5   sats 93 % on 8L NC/ fio2 35%. tapered to 5L/28% needs LTAC for HF oxygen.  repeated a cxray 5/4 - Interval development of increased opacification within the right lung base, may reflect developing infiltrate versus atelectasis. started antibiotics- vanc and zosyn. monitor for vancomycin toxity. plan to switch to doxycycline. Consider pulmonary eval .  just completed  course of zosyn 4/29   5/6 tolerated 6L NC/ fio2 35% overnight . peer to peer with blanca today. EKG with sinus tachycardia. discussed [facility] pulmonology. monitor off antibiotics zosyn and vancomcyn as he completed adequate course of antibiotics for pseudomas and aspiration. CX ray improvement may lag behind treatment     Goals of care, counseling/discussion  Advance Care Planning      Does patient have Capacity? Yes  Contact: Bridgett stephen, wife  Goals/Wishes: wants to go home, HH w Home PT  Code Status- FULL  Pain management- seems comfortable without pain  Prognosis- s/p radiation, progressing cancer, high risk for aspiration, severe malnutrition  Family discussions-  4/28, 4/29 - discussed care and condition w pt and his wife.  He can talk w trach collar but sats drop.   He wants to go home. Has all oxygen supplies at home. Pt completed zosyn.   5/2 - spoke to his wife. Reviewed his care and condition. She was able to calm him down. She is encouraging him. Will start remeron for appetite and depression. She is working hard to keep him comfortable and well-cared for.  Functional Status - has trach and peg. recommended outpt f/u palliative care    Post acute care plan: pt would benefit from outpt referral to Palliative care, plan for HH and home.    5/4 - not able to wean oxygen.  He needs a LTAC for HF oxygen. Waiting on ins approval      Hypernatremia  4/25 sodium 150. started on D5W . repeat renal panel in PM   5/4- sodium trended down to 145 -> 140, on TFs -> 138 -> 139, not receiving extra water in TFs -> 141    Hypophosphatemia  Replaced  5/4- stable      Aspiration pneumonia  4/22 sats 95% on trach collar 10/ fio2 60% . completed azithromycin.   started on Zosyn for possible aspiration  repeat CX ray-in the inferior hemithorax on the left side consistent with airspace consolidation/volume loss in the left lower lobe is again appreciated, but the lung zones are essentially stable since the prior exam and demonstrate no new areas of airspace consolidation or volume loss.  respiratory culture 4/19 with pseudomonas.   -  completed zosyn  5/4 - new infiltrate RLL. Resume zosyn, vanc, consult pulm     Sinus tachycardia        Dysphagia  Nutrition consulted. Most recent weight and BMI monitored-          Malnutrition (Moderate to Severe)  Weight Loss (Malnutrition): greater than 10% in 6 months                 Measurements:  Wt Readings from Last 1 Encounters:   05/04/22 62.4 kg (137 lb 9.1 oz)   Body mass index is 20.92 kg/m².    Recommendations: Recommendation/Intervention: 1.  Goals: Meet % EEN, EPN by RD f/u date    Patient has been screened and assessed by RD. RD will follow patient.    4/21 secondary to above. on G tube feedings   4/22 changed to bolus GT feedings.   4/29  - pt noted to sometimes refuse TFs. , on bolus TFs, no water added      Hypokalemia  replaced   5/4 - stable      Chronic respiratory failure with hypoxia, on home O2 therapy  secondary to COPD. on 5L oxygen  at home  5/4  - continue weaning efforts, currently on 8 liters  5/5  past week not able to wean oxygen.   sats 93 % on 8L NC/ fio2 35%. needs LTAC for HF oxygen.  repeated a cxray 5/4 - Interval development of increased opacification within the right lung base, may reflect developing infiltrate versus atelectasis. started antibiotics- vanc and zosyn.  Consulted pulmonary.  just completed  course of zosyn 4/29     Protein-calorie malnutrition  Nutrition consulted. Most recent weight and BMI monitored-          Malnutrition (Moderate to Severe)  Weight Loss (Malnutrition): greater than 10% in 6 months              Measurements:  Wt Readings from Last 1 Encounters:   05/04/22 62.4 kg (137 lb 9.1 oz)   Body mass index is 20.92 kg/m².    Recommendations: Recommendation/Intervention: 1.  Goals: Meet % EEN, EPN by RD f/u date    Patient has been screened and assessed by RD. RD will follow patient.  See above      Normocytic anemia    Patient's with Normocytic anemia.. Hemoglobin stable. Etiology likely due to chronic disease .  Current CBC reviewed-    Recent Labs   Lab 05/02/22  0632 05/03/22  0457 05/04/22  0500   HGB 9.5* 9.5* 9.4*     Monitor CBC and transfuse if H/H drops below 7/21.   - improving      Other hyperlipidemia  Continue home statin         Primary hypertension  Chronic, controlled.  Latest blood pressure and vitals reviewed-   Temp:  [97.3 °F (36.3 °C)-98.5 °F (36.9 °C)]   Pulse:  []   Resp:  [17-25]   BP: ()/(64-81)   SpO2:  [89 %-97 %] .   Home meds for hypertension were reviewed- amlodipine, losartan and metoprolol  held for now  PRN meds if BP> 180/110 mm HG  5/4 - not requiring scheduled meds      Coronary artery disease involving native coronary artery of native heart without  angina pectoris  Continue ASA, plavix, and statin   -stable    Squamous cell cancer of tongue    12/15/21 FNA left neck mass : squamous cell carcinoma, p16 negative  1/4/22 total glossectomy, bilateral neck dissection, bilateral cervical facial advancement flaps and anterolateral thigh free flap reconstruction of his glossectomy defect.  Final path :  [id number] (+microscopic SALINAS, single contralateral node), superior soft tissue margin of left neck with tumor.  2/28/22 start chemoradiation  Finished chemo with cisplatin 4/6. Was going to complete final dose of radiation tomorrow.     -Rad/onc consulted for radiation while inpatient  4/22  s/p radiation oncology eval -on adjuvant chemoradiation, completed 31/33 fractions of RT.  renitiation of RT  inpatient when patient able to tolerate.    4/23  completed radiation sessions  5/4 -attempting to  wean oxygen, takes 5 liters at home and has all supplies.     VTE Risk Mitigation (From admission, onward)         Ordered     enoxaparin injection 40 mg  Daily         04/19/22 1123     IP VTE HIGH RISK PATIENT  Once         04/19/22 1123     Place sequential compression device  Until discontinued         04/19/22 1123                Discharge Planning   NISA: 5/7/2022     Code Status: Full Code   Is the patient medically ready for discharge?: No    Reason for patient still in hospital (select all that apply): Treatment  Discharge Plan A: Long-term acute care facility (LTAC)   Discharge Delays: None known at this time              Jordan Armenta MD  Department of Hospital Medicine   [facility] - Telemetry Stepdown

## 2022-05-06 NOTE — PLAN OF CARE
Pt's family has completed a fast appeal to [facility] regarding LTAC denial.  Left message with Cecille of [facility] [phone] for update.  Will continue to follow for discharge needs.    Update:  N physician missed P2P call time today.  [facility] physician to complete P2P with Dr. Armenta tomorrow between 9am and 10am.  MD informed of the above.    Trudi Nina RN CM  Case Management  [phone]

## 2022-05-06 NOTE — RESPIRATORY THERAPY
RAPID RESPONSE RESPIRATORY THERAPY PROACTIVE NOTE           Time of visit: 750     Code Status: Full Code   : 1950  Bed: 8094/8094 A:   MRN: [id number]  Time spent at the bedside: 15 -30 min    SITUATION    Evaluated patient for: LDA Check     BACKGROUND    Patient has a past medical history of Cancer, COPD (chronic obstructive pulmonary disease), Hyperlipidemia, Hypertension, and Pseudoaneurysm.  Clinically Significant Surgical Hx: tracheostomy    24 Hours Vitals Range:  Temp:  [97.6 °F (36.4 °C)-98.3 °F (36.8 °C)]   Pulse:  []   Resp:  [16-26]   BP: (123-156)/(72-84)   SpO2:  [93 %-99 %]     Labs:    Recent Labs     22  0500 22  0630 22  0509    140 138   K 4.0 4.0 4.0    101 100   CO2 29 31* 31*   CREATININE 0.6 0.7 0.7   * 110 126*   PHOS 4.2 4.5 3.4   MG 2.1 2.1 2.0        No results for input(s): PH, PCO2, PO2, HCO3, POCSATURATED, BE in the last 72 hours.    ASSESSMENT/INTERVENTIONS    Upon arrival in room pt resting in bed. No respiratory new concerns at this time, per pt.  All trach supplies, extra Shiley 4 uncuffed and Shiley 6 cuffed (X2) at bedside.    Last VS   Temp: 97.9 °F (36.6 °C) ( 0437)  Pulse: 94 (827)  Resp: 25 (827)  BP: 156/84 ( 0755)  SpO2: 96 % (827)    Level of Consciousness: Level of Consciousness (AVPU): alert  Respiratory Effort: Respiratory Effort: Normal, Unlabored Expansion/Accessory Muscle Usage: Expansion/Accessory Muscles/Retractions: no use of accessory muscles, no retractions, expansion symmetric  All Lung Field Breath Sounds: All Lung Fields Breath Sounds: Anterior:, Lateral:, diminished  DEE Breath Sounds: coarse, diminished  LLL Breath Sounds: diminished  RUL Breath Sounds: coarse, diminished  RML Breath Sounds: diminished  RLL Breath Sounds: diminished  O2 Device/Concentration: Flow (L/min): 5, Oxygen Concentration (%): 28, O2 Device (Oxygen Therapy): Trach Collar  Surgical airway: Yes, Type:  Eldaevens Size: 6, uncuffed  Ambu at bedside: Ambu bag with the patient?: Yes, Adult Ambu     Active Orders   Respiratory Care    Chest physiotherapy TID     Frequency: TID     Number of Occurrences: Until Specified     Order Questions:      Indications: COPIOUS SPUTUM PRODUCTION      Indications: ATELECTASIS PROPHYLAXIS      Indications: ATELECTASIS NONRESPONSIVE TO TX    Inhalation Treatment Q4H     Frequency: Q4H     Number of Occurrences: Until Specified    Oxygen Continuous     Frequency: Continuous     Number of Occurrences: Until Specified     Order Questions:      Device type: Low flow      Device: Trach Collar (Comment: 8L)      FiO2%: 35%      Titrate O2 per Oxygen Titration Protocol: Yes      To maintain SpO2 goal of: >= 90%      Notify MD of: Inability to achieve desired SpO2; Sudden change in patient status and requires 20% increase in FiO2; Patient requires >60% FiO2    Pulse Oximetry Continuous     Frequency: Continuous     Number of Occurrences: Until Specified    Routine tracheostomy care     Frequency: BID     Number of Occurrences: Until Specified       RECOMMENDATIONS    We recommend: RRT Recs: Continue POC per primary team.    ESCALATION    POC and orders reviewed with bedside RTkenzie.    FOLLOW-UP    Please call back the Rapid Response RTGwen, RRT at x [phone] for any questions or concerns.

## 2022-05-06 NOTE — NURSING
Pt in bed with eyes open, AOx4, able to communicate his needs via writing on notepad or hand gestures, denies pain, no distress noted, bed locked in lowest position, call bell in reach, instructed to call when assistance needed.

## 2022-05-06 NOTE — PLAN OF CARE
Pt's wife completed fast appeal with [facility]. Pt O2 levels fluctuates and is at 8 liters at nighttime.  Pt unable to discharge home on 8 liters.  Pt nor pt's wife is interested in SNF but is open to LTAC.  DANIELE will continue to follow.     DANIELE received a call from Cecille cheek/ [facility] [phone] in reference to denial and Peer to Peer for fast appeal.   DANIELE sent updated clinicals to Cecille via email.  DANIELE also provided contact information for Dr. Armenta to complete a Peer to Peer.  DANIELE will continue to follow.    Milady Thomson LMSW  PRN-  [facility]  Ext. [phone]

## 2022-05-07 LAB
ALBUMIN SERPL BCP-MCNC: 2.7 G/DL (ref 3.5–5.2)
ALP SERPL-CCNC: 78 U/L (ref 55–135)
ALT SERPL W/O P-5'-P-CCNC: 11 U/L (ref 10–44)
ANION GAP SERPL CALC-SCNC: 9 MMOL/L (ref 8–16)
AST SERPL-CCNC: 13 U/L (ref 10–40)
BASOPHILS # BLD AUTO: 0.03 K/UL (ref 0–0.2)
BASOPHILS NFR BLD: 0.6 % (ref 0–1.9)
BILIRUB SERPL-MCNC: 0.2 MG/DL (ref 0.1–1)
BUN SERPL-MCNC: 14 MG/DL (ref 8–23)
CALCIUM SERPL-MCNC: 9.2 MG/DL (ref 8.7–10.5)
CHLORIDE SERPL-SCNC: 99 MMOL/L (ref 95–110)
CO2 SERPL-SCNC: 31 MMOL/L (ref 23–29)
CREAT SERPL-MCNC: 0.7 MG/DL (ref 0.5–1.4)
DIFFERENTIAL METHOD: ABNORMAL
EOSINOPHIL # BLD AUTO: 0.1 K/UL (ref 0–0.5)
EOSINOPHIL NFR BLD: 2 % (ref 0–8)
ERYTHROCYTE [DISTWIDTH] IN BLOOD BY AUTOMATED COUNT: 16.3 % (ref 11.5–14.5)
EST. GFR  (AFRICAN AMERICAN): >60 ML/MIN/1.73 M^2
EST. GFR  (NON AFRICAN AMERICAN): >60 ML/MIN/1.73 M^2
GLUCOSE SERPL-MCNC: 99 MG/DL (ref 70–110)
HCT VFR BLD AUTO: 32.8 % (ref 40–54)
HGB BLD-MCNC: 10 G/DL (ref 14–18)
IMM GRANULOCYTES # BLD AUTO: 0.01 K/UL (ref 0–0.04)
IMM GRANULOCYTES NFR BLD AUTO: 0.2 % (ref 0–0.5)
LYMPHOCYTES # BLD AUTO: 0.7 K/UL (ref 1–4.8)
LYMPHOCYTES NFR BLD: 13 % (ref 18–48)
MAGNESIUM SERPL-MCNC: 2 MG/DL (ref 1.6–2.6)
MCH RBC QN AUTO: 28.7 PG (ref 27–31)
MCHC RBC AUTO-ENTMCNC: 30.5 G/DL (ref 32–36)
MCV RBC AUTO: 94 FL (ref 82–98)
MONOCYTES # BLD AUTO: 0.6 K/UL (ref 0.3–1)
MONOCYTES NFR BLD: 11 % (ref 4–15)
NEUTROPHILS # BLD AUTO: 3.7 K/UL (ref 1.8–7.7)
NEUTROPHILS NFR BLD: 73.2 % (ref 38–73)
NRBC BLD-RTO: 0 /100 WBC
PHOSPHATE SERPL-MCNC: 3.7 MG/DL (ref 2.7–4.5)
PLATELET # BLD AUTO: 215 K/UL (ref 150–450)
PMV BLD AUTO: 9.1 FL (ref 9.2–12.9)
POTASSIUM SERPL-SCNC: 4.4 MMOL/L (ref 3.5–5.1)
PROT SERPL-MCNC: 6.1 G/DL (ref 6–8.4)
RBC # BLD AUTO: 3.49 M/UL (ref 4.6–6.2)
SODIUM SERPL-SCNC: 139 MMOL/L (ref 136–145)
WBC # BLD AUTO: 5.01 K/UL (ref 3.9–12.7)

## 2022-05-07 PROCEDURE — 25000003 PHARM REV CODE 250: Performed by: HOSPITALIST

## 2022-05-07 PROCEDURE — 84100 ASSAY OF PHOSPHORUS: CPT | Performed by: HOSPITALIST

## 2022-05-07 PROCEDURE — 99900026 HC AIRWAY MAINTENANCE (STAT)

## 2022-05-07 PROCEDURE — 25000242 PHARM REV CODE 250 ALT 637 W/ HCPCS: Performed by: INTERNAL MEDICINE

## 2022-05-07 PROCEDURE — 85025 COMPLETE CBC W/AUTO DIFF WBC: CPT | Performed by: HOSPITALIST

## 2022-05-07 PROCEDURE — 99232 SBSQ HOSP IP/OBS MODERATE 35: CPT | Mod: ,,, | Performed by: HOSPITALIST

## 2022-05-07 PROCEDURE — 25000003 PHARM REV CODE 250: Performed by: STUDENT IN AN ORGANIZED HEALTH CARE EDUCATION/TRAINING PROGRAM

## 2022-05-07 PROCEDURE — 63600175 PHARM REV CODE 636 W HCPCS: Performed by: INTERNAL MEDICINE

## 2022-05-07 PROCEDURE — 36415 COLL VENOUS BLD VENIPUNCTURE: CPT | Performed by: HOSPITALIST

## 2022-05-07 PROCEDURE — 20600001 HC STEP DOWN PRIVATE ROOM

## 2022-05-07 PROCEDURE — 99900035 HC TECH TIME PER 15 MIN (STAT)

## 2022-05-07 PROCEDURE — 94668 MNPJ CHEST WALL SBSQ: CPT

## 2022-05-07 PROCEDURE — 94761 N-INVAS EAR/PLS OXIMETRY MLT: CPT

## 2022-05-07 PROCEDURE — 27201112

## 2022-05-07 PROCEDURE — 83735 ASSAY OF MAGNESIUM: CPT | Performed by: HOSPITALIST

## 2022-05-07 PROCEDURE — 94640 AIRWAY INHALATION TREATMENT: CPT

## 2022-05-07 PROCEDURE — 27000221 HC OXYGEN, UP TO 24 HOURS

## 2022-05-07 PROCEDURE — 31720 CLEARANCE OF AIRWAYS: CPT

## 2022-05-07 PROCEDURE — 80053 COMPREHEN METABOLIC PANEL: CPT | Performed by: HOSPITALIST

## 2022-05-07 PROCEDURE — 99232 PR SUBSEQUENT HOSPITAL CARE,LEVL II: ICD-10-PCS | Mod: ,,, | Performed by: HOSPITALIST

## 2022-05-07 RX ADMIN — ENOXAPARIN SODIUM 40 MG: 40 INJECTION SUBCUTANEOUS at 05:05

## 2022-05-07 RX ADMIN — ATORVASTATIN CALCIUM 20 MG: 20 TABLET, FILM COATED ORAL at 09:05

## 2022-05-07 RX ADMIN — IPRATROPIUM BROMIDE AND ALBUTEROL SULFATE 3 ML: 2.5; .5 SOLUTION RESPIRATORY (INHALATION) at 04:05

## 2022-05-07 RX ADMIN — SCOPALAMINE 1 PATCH: 1 PATCH, EXTENDED RELEASE TRANSDERMAL at 05:05

## 2022-05-07 RX ADMIN — MELATONIN TAB 3 MG 6 MG: 3 TAB at 08:05

## 2022-05-07 RX ADMIN — ASPIRIN 81 MG CHEWABLE TABLET 81 MG: 81 TABLET CHEWABLE at 09:05

## 2022-05-07 RX ADMIN — IPRATROPIUM BROMIDE AND ALBUTEROL SULFATE 3 ML: 2.5; .5 SOLUTION RESPIRATORY (INHALATION) at 12:05

## 2022-05-07 RX ADMIN — CLOPIDOGREL 75 MG: 75 TABLET, FILM COATED ORAL at 09:05

## 2022-05-07 RX ADMIN — POLYETHYLENE GLYCOL 3350 17 G: 17 POWDER, FOR SOLUTION ORAL at 09:05

## 2022-05-07 RX ADMIN — GUAIFENESIN 200 MG: 100 SOLUTION ORAL at 09:05

## 2022-05-07 RX ADMIN — THIAMINE HCL TAB 100 MG 100 MG: 100 TAB at 09:05

## 2022-05-07 RX ADMIN — IPRATROPIUM BROMIDE AND ALBUTEROL SULFATE 3 ML: 2.5; .5 SOLUTION RESPIRATORY (INHALATION) at 09:05

## 2022-05-07 RX ADMIN — FOLIC ACID 1 MG: 1 TABLET ORAL at 09:05

## 2022-05-07 RX ADMIN — GLYCOPYRROLATE 2 MG: 1 LIQUID ORAL at 09:05

## 2022-05-07 RX ADMIN — IPRATROPIUM BROMIDE AND ALBUTEROL SULFATE 3 ML: 2.5; .5 SOLUTION RESPIRATORY (INHALATION) at 08:05

## 2022-05-07 NOTE — RESPIRATORY THERAPY
RAPID RESPONSE RESPIRATORY THERAPY PROACTIVE NOTE           Time of visit:      Code Status: Full Code   : 1950  Bed: 8094/8094 A:   MRN: [id number]  Time spent at the bedside: < 15 min    SITUATION    Evaluated patient for: LDA Check     BACKGROUND    Patient has a past medical history of Cancer, COPD (chronic obstructive pulmonary disease), Hyperlipidemia, Hypertension, and Pseudoaneurysm.  Clinically Significant Surgical Hx: tracheostomy    24 Hours Vitals Range:  Temp:  [96.4 °F (35.8 °C)-98.1 °F (36.7 °C)]   Pulse:  []   Resp:  [17-26]   BP: (131-160)/(82-88)   SpO2:  [91 %-100 %]     Labs:    Recent Labs     22  0630 22  0509 22  0659    138 139   K 4.0 4.0 4.4    100 99   CO2 31* 31* 31*   CREATININE 0.7 0.7 0.7    126* 99   PHOS 4.5 3.4 3.7   MG 2.1 2.0 2.0        No results for input(s): PH, PCO2, PO2, HCO3, POCSATURATED, BE in the last 72 hours.    ASSESSMENT/INTERVENTIONS    Upon arrival in room patient in bed, wearing 5L 28% trach collar. All supplies in room. Extra cuffed trachs sizes 4, and 2 size 6 at bedside.    Last VS   Temp: 96.4 °F (35.8 °C) (1606)  Pulse: 114 (1646)  Resp: 20 (1646)  BP: 138/88 ( 160)  SpO2: 96 % (1646)    Level of Consciousness: Level of Consciousness (AVPU): alert  Respiratory Effort: Respiratory Effort: Normal, Unlabored Expansion/Accessory Muscle Usage: Expansion/Accessory Muscles/Retractions: expansion symmetric  All Lung Field Breath Sounds: All Lung Fields Breath Sounds: Anterior:, Posterior:, Lateral:  DEE Breath Sounds: coarse, diminished  LLL Breath Sounds: diminished  RUL Breath Sounds: coarse, diminished  RML Breath Sounds: diminished  RLL Breath Sounds: diminished  O2 Device/Concentration: Flow (L/min): 5, Oxygen Concentration (%): 28, O2 Device (Oxygen Therapy): Trach Collar  Surgical airway: Yes, Type: Shiley Size: 6, uncuffed  Ambu at bedside: Ambu bag with the patient?: Yes, Adult  Ambu     Active Orders   Respiratory Care    Chest physiotherapy TID     Frequency: TID     Number of Occurrences: Until Specified     Order Questions:      Indications: COPIOUS SPUTUM PRODUCTION      Indications: ATELECTASIS PROPHYLAXIS      Indications: ATELECTASIS NONRESPONSIVE TO TX    Inhalation Treatment Q4H     Frequency: Q4H     Number of Occurrences: Until Specified    Oxygen Continuous     Frequency: Continuous     Number of Occurrences: Until Specified     Order Questions:      Device type: Low flow      Device: Trach Collar (Comment: 8L)      FiO2%: 35%      Titrate O2 per Oxygen Titration Protocol: Yes      To maintain SpO2 goal of: >= 90%      Notify MD of: Inability to achieve desired SpO2; Sudden change in patient status and requires 20% increase in FiO2; Patient requires >60% FiO2    Pulse Oximetry Continuous     Frequency: Continuous     Number of Occurrences: Until Specified    Routine tracheostomy care     Frequency: BID     Number of Occurrences: Until Specified       RECOMMENDATIONS    We recommend: RRT Recs: Continue POC per primary team.    ESCALATION        FOLLOW-UP    Please call back the Rapid Response RTRylan RRT at x [phone] for any questions or concerns.

## 2022-05-07 NOTE — NURSING
Pt in bed with eyes open, no distress noted, trach collar on 5L, sat 96%, no c/o pain, no residual noted, bolus feedings given, medications given, bed locked in lowest position, call bell in reach, instructed to call when assistance needed.

## 2022-05-07 NOTE — PROGRESS NOTES
Pasha Cherry - Telemetry [facility] Medicine  Progress Note    Patient Name: Fareed Richard Jr.  MRN: [id number]  Patient Class: IP- Inpatient   Admission Date: 4/19/2022  Length of Stay: 18 days  Attending Physician: Jordan Armenta MD  Primary Care Provider: Kodi Tubbs MD        Subjective:     Principal Problem:Acute on chronic respiratory failure        HPI:  Mr. 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 Per wife he has had several days of increasing lethargy, increased work of breathing and secretions. Yesterday she became particularly concerned when he became slightly less responsive and interactive. Wife also notes increased yellow thick secretions from trach site. Upon EMS arrival he was on his home 5 L by trach collar and saturating 93% with significant wheezing.  He received 1 DuoNeb by them.  He was also suctioned.     Currently patient is alert, following commands.  He is responding to yes no questions.  He denies any chest pain but does endorse shortness of breath and cough.  He denies any abdominal pain or pain elsewhere       ED course:   He was given 500ccs IVF as well as one time doses of vanc/cefepime/azithro. Labs significant for BNP of 1600, WBC 5k, K 5.6, Bicarb 35, VBG 7.26/87/25/40. Trop wnl. CXR with new cardiomegaly and pulmonary edema. Suctioned with return of copious secretions. ICU was consulted for acute hypoxemic respiratory failure and he will be admitted for further management.              Overview/Hospital Course:    Admitted to ICU, started on CAP coverage and solumedrol. Quickly weaned off vent after return of copious secretions on suction. Has been maintaining sats well on home O2. Rad/onc consulted for completion of radiology while inpatient. Pending culture sensitivity.    4/21 Transfer to hospital  medicine . 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  initially requiring vent but has now been stable on home trach collar for 24 hours. sats 93% on 8L via trach collar.  Awaiting cultures from sputum, on CAP coverage with ceftriaxone. completed Azithromycin. continue solumedrol 40mg IV daily.  rad/onc consulted this morning-he was supposed to complete radiation outpt but was admitted, attempting to set up for inpatient  4/22 changed to bolus GT feedings. s/p radiation oncology eval -on adjuvant chemoradiation, completed 31/33 fractions of RT.  renitiation of RT  inpatient when patient able to tolerate. sats 95% on trach collar 10/ fio2 60% . completed azithromycin.  started on Zosyn for possible aspiration  repeat CX ray-in the inferior hemithorax on the left side consistent with airspace consolidation/volume loss in the left lower lobe is again appreciated, but the lung zones are essentially stable since the prior exam and demonstrate no new areas of airspace consolidation or volume loss. respiratory culture 4/19 with pansensitive  pseudomonas.     4/23 intermittently refusing bolus GT feedings.stable on 10L/60% Fio2 . completed radiation sessions. off solumedrol  today. PT recs SNF  4/24 K and P replaced   4/25 sodium 150. started on D5W . repeat renal panel in PM . oxygen tapered to 8L/ Fio2 35%   4/26 K of 3.2  replaced   4/27 stable on 8L/ Fio2 35% . mucous plug remvove by suction , wants glycopyrrolate PRN due to dry mouth   Interval History:   4/28 - has trach, G tube, NPO, completed radiation treatments, 92% on 8 liters,+ stool and urinations. Weaning oxygen. Wants to go home w HH. Discussed his care and condition with his wife.    4/29 - still requiring 8 liters per NC. Sats low 90s. I lowered oxygen to 5 liters while in the room and he tolerated it.  Keep weaning.   4/30 - back up  to 8 liters per NC, refused some Bolus TFs yesterday  5/1 - not able to wean oxygen.  He may need a LTAC for HF oxygen. Otherwise, progressing,. Sat up in chair yesterday, + urinations and BM.   5/2 - on 8 liters per NC this am. 92%.  His home goal is 5 liters. Pt tends to need more at night.  Plan is to keep him overnight on 5 liters to make sure he is safe for home oxygen.  He may benefit from LTAC a few days. Accepted to LTAC, waiting on ins auth.  Needs PT/OT for deconditioning.  5/3 -  8 liters, up in chair a am, monitor % meals, on nebs q 4 hours. PT/OT, trach care and suctioning. LTAC referrals sent.   5/4 - nurses unable to keep flow rate below 8 overnight, despite coaching. His volume status is stable. Repeat cxr. - Interval development of increased opacification within the right lung base, may reflect developing infiltrate versus atelectasis.  Small bilateral pleural effusions, increased on the left as compared to prior.  5/5  past week not able to wean oxygen.   sats 93 % on 8L NC/ fio2 35%. tapered to 5L/28% needs LTAC for HF oxygen.  repeated a cxray 5/4 - Interval development of increased opacification within the right lung base, may reflect developing infiltrate versus atelectasis. started antibiotics- vanc and zosyn.  Consider pulmonary eval .  just completed  course of zosyn 4/29 5/6 tolerated 6L NC/ fio2 35% overnight . peer to peer with insurane today. EKG with sinus tachycardia   discussed [facility] pulmonology. monitor off antibiotics zosyn and vancomcyn as he completed adequate course of antibiotics for pseudomas and aspiration. CX ray improvement may lag behind treatment  5/7 stable on 5L/ fio2 28% overnight .completed peer to peer with insurance today . awaiting decision           Review of Systems:   Pain scale:   Constitutional:  fever,  chills, headache, vision loss, hearing loss, weight loss, Generalized weakness, falls, loss of smell, loss of taste, poor appetite,  sore throat, voice  change,immunocompromised state.   Respiratory: cough, shortness of breath.   Cardiovascular: chest pain, dizziness, palpitations, orthopnea, swelling of feet, syncope  Gastrointestinal: nausea, vomiting, abdominal pain, diarrhea, black stool,  blood in stool, change in bowel habits  Genitourinary: hematuria, dysuria, urgency, frequency  Integument/Breast: rash,  pruritis  Hematologic/Lymphatic: easy bruising, lymphadenopathy  Musculoskeletal: arthralgias , myalgias, back pain, neck pain, knee pain  Neurological: confusion, seizures, tremors, slurred speech  Behavioral/Psych:  depression, anxiety, auditory or visual hallucinations        OBJECTIVE:     Physical Exam:  Body mass index is 20.92 kg/m².       Constitutional: Appears  thin built   Head: Normocephalic and atraumatic.   Neck: Normal range of motion. Neck supple. trach collar  Cardiovascular: Normal heart rate.  Regular heart rhythm.  Pulmonary/Chest: Effort normal.   Abdominal: No distension.  No tenderness, PEG tube insitu   Musculoskeletal: Normal range of motion. No edema.   Neurological: Alert and oriented to person, place, and time.   Skin: Skin is warm and dry.   Psychiatric: Normal mood and affect. Behavior is normal.       Vital Signs  Temp: 96.4 °F (35.8 °C) (05/07/22 1606)  Pulse: (Abnormal) 114 (05/07/22 1646)  Resp: 20 (05/07/22 1646)  BP: 138/88 (05/07/22 1606)  SpO2: (Abnormal) 91 % (05/07/22 1646)     24 Hour VS Range    Temp:  [96.4 °F (35.8 °C)-98.1 °F (36.7 °C)]   Pulse:  []   Resp:  [17-26]   BP: (131-160)/(82-88)   SpO2:  [91 %-100 %]     Intake/Output Summary (Last 24 hours) at 5/7/2022 1731  Last data filed at 5/7/2022 0618  Gross per 24 hour   Intake no documentation   Output 625 ml   Net -625 ml         I/O This Shift:  No intake/output data recorded.    Wt Readings from Last 3 Encounters:   05/04/22 62.4 kg (137 lb 9.1 oz)   04/06/22 63 kg (138 lb 14.2 oz)   04/04/22 63 kg (139 lb)       I have personally reviewed the vitals  and recorded Intake/Output     Laboratory/Diagnostic Data:    CBC/Anemia Labs: Coags:    Recent Labs   Lab 05/05/22  0630 05/06/22  0509 05/07/22  0659   WBC 4.36 4.62 5.01   HGB 8.9* 9.0* 10.0*   HCT 36.5* 29.7* 32.8*    222 215   * 98 94   RDW 14.6* 16.6* 16.3*    No results for input(s): PT, INR, APTT in the last 168 hours.     Chemistries: ABG:   Recent Labs   Lab 05/05/22  0630 05/06/22  0509 05/07/22  0659    138 139   K 4.0 4.0 4.4    100 99   CO2 31* 31* 31*   BUN 20 16 14   CREATININE 0.7 0.7 0.7   CALCIUM 8.8 8.9 9.2   PROT 5.6* 6.1 6.1   BILITOT 0.2 0.2 0.2   ALKPHOS 68 70 78   ALT 9* 11 11   AST 11 12 13   MG 2.1 2.0 2.0   PHOS 4.5 3.4 3.7    No results for input(s): PH, PCO2, PO2, HCO3, POCSATURATED, BE in the last 168 hours.     POCT Glucose: HbA1c:    No results for input(s): POCTGLUCOSE in the last 168 hours. No results found for: HGBA1C     Cardiac Enzymes: Ejection Fractions:    No results for input(s): CPK, CPKMB, MB, TROPONINI in the last 72 hours. EF   Date Value Ref Range Status   04/19/2022 60 % Final   12/30/2021 65 % Final          No results for input(s): COLORU, APPEARANCEUA, PHUR, SPECGRAV, PROTEINUA, GLUCUA, KETONESU, BILIRUBINUA, OCCULTUA, NITRITE, UROBILINOGEN, LEUKOCYTESUR, RBCUA, WBCUA, BACTERIA, SQUAMEPITHEL, HYALINECASTS in the last 48 hours.    Invalid input(s): WRIGHTSUR    Procalcitonin (ng/mL)   Date Value   04/19/2022 0.09     Lactate (Lactic Acid) (mmol/L)   Date Value   04/19/2022 1.0     BNP (pg/mL)   Date Value   04/22/2022 132 (H)   04/19/2022 1,607 (H)   03/21/2022 242 (H)   11/02/2019 260 (H)   12/27/2018 239 (H)     No results found for: CRP, SEDRATE  No results found for: DDIMER  Ferritin (ng/mL)   Date Value   02/16/2022 135     No results found for: LDH  Troponin I (ng/mL)   Date Value   04/19/2022 0.018   11/02/2019 0.016   12/27/2018 <0.006     No results found for this or any previous visit.  SARS-CoV2 (COVID-19) Qualitative PCR (no  units)   Date Value   05/02/2022 Not Detected   12/31/2021 Not Detected     SARS-CoV-2 RNA, Amplification, Qual (no units)   Date Value   01/13/2022 Negative   01/03/2022 Negative     POC Rapid COVID (no units)   Date Value   04/19/2022 Negative       Microbiology labs for the last week  Microbiology Results (last 7 days)     Procedure Component Value Units Date/Time    Culture, Respiratory with Gram Stain [[id number]]     Order Status: No result Specimen: Respiratory           Reviewed and noted in plan where applicable- Please see chart for full lab data.    Lines/Drains:       Peripheral IV - Single Lumen 04/25/22 2300 22 G Anterior;Distal;Left Upper Arm (Active)   Site Assessment Clean;Dry;Intact 05/05/22 0318   Extremity Assessment Distal to IV No abnormal discoloration 05/04/22 1100   Line Status Saline locked 05/04/22 1100   Dressing Status Clean;Dry;Intact 05/04/22 1100   Dressing Intervention Integrity maintained 05/04/22 1100   Dressing Change Due 04/28/22 04/28/22 0912   Site Change Due 04/26/22 04/28/22 0912   Reason Not Rotated Poor venous access 05/03/22 0735   Number of days: 9            Gastrostomy/Enterostomy 01/04/22 Percutaneous endoscopic gastrostomy (PEG) LUQ (Active)   Securement secured to abdomen 05/04/22 1100   Interventions Prior to Feeding patency checked;residual checked 05/04/22 1100   Feeding Type bolus 05/04/22 1100   Clamp Status/Tolerance clamped;no abdominal discomfort;no abdominal distention;no emesis;no nausea;no residual;no restlessness 05/04/22 1100   Feeding Action feeding continued 05/03/22 0735   Dressing dry and intact 05/04/22 1100   Insertion Site no redness;no warmth;no drainage;no tenderness;no swelling 05/04/22 1100   Site Care secured w/ tape 05/04/22 1100   Flush/Irrigation flushed w/;water;no resistance met 05/04/22 1100   Dressing Change Due 04/28/22 04/28/22 0912   Intake (mL) 60 mL 04/30/22 1250   Water Bolus (mL) 50 mL 05/03/22 1700   Tube Output(mL)(Include  Discarded Residual) 0 mL 04/28/22 1700   Formula Name Sarah 05/03/22 0735   Tube Feeding Intake (mL) 375 05/03/22 1700   Residual Amount (ml) 0 ml 05/04/22 1100   Length Of Feeding (Min) 15 04/28/22 0912   Number of days: 121       Imaging  ECG Results          EKG 12-lead (Final result)  Result time 04/19/22 17:42:02    Final result by Interface, Lab In [facility] (04/19/22 17:42:02)             Narrative:    Test Reason : R06.02,    Vent. Rate : 081 BPM     Atrial Rate : 081 BPM     P-R Int : 158 ms          QRS Dur : 092 ms      QT Int : 366 ms       P-R-T Axes : 061 -70 045 degrees     QTc Int : 425 ms    Normal sinus rhythm  Left axis deviation Now present  Incomplete right bundle branch block  Abnormal ECG  When compared with ECG of 10-MADONNA-2022 10:11,    Confirmed by DIONNE SINGH MD ([id number]) on 4/19/2022 5:41:52 PM    Referred By: [email or web address]   SELF           Confirmed By:DIONNE SINGH MD                            Results for orders placed during the hospital encounter of 04/19/22    Echo    Interpretation Summary  · The left ventricle is normal in size with normal systolic function. The estimated ejection fraction is 60%.  · Normal right ventricular size with normal right ventricular systolic function.  · Normal left ventricular diastolic function.  · The aortic root is mildly dilated.  · Mechanically ventilated; cannot use inferior caval vein diameter to estimate central venous pressure. The IVC is not enlarged and does collapse somewhat with the respiratory cycle, essentially ruling out venous hypertension.      X-Ray Chest AP Portable  Narrative: EXAMINATION:  XR CHEST AP PORTABLE    CLINICAL HISTORY:  hypoxia;    TECHNIQUE:  Single frontal view of the chest was performed.    COMPARISON:  Chest radiograph 04/22/2022, 04/21/2022, 04/19/2022.    FINDINGS:  Monitoring leads overlie the thorax.  There is a tracheostomy cannula in stable positioning.  The trachea is midline.  Cardiomediastinal silhouette is  stable.  There is aortic plaque.  There are small bilateral pleural effusions, probably increased on the left.  Additionally, there is increased opacification over the right lung base, which may reflect atelectasis or developing infiltrate.  No pneumothorax.  The osseous structures appear intact.    There are postoperative changes in the right axillary region there is no evidence of free air beneath the hemidiaphragms.  Impression: Interval development of increased opacification within the right lung base, may reflect developing infiltrate versus atelectasis.    Small bilateral pleural effusions, increased on the left as compared to prior.    Electronically signed by resident: Tim Aparicio  Date:    05/04/2022  Time:    08:52    Electronically signed by: Daniel Álvarez MD  Date:    05/04/2022  Time:    09:12      Labs, Imaging, EKG and Diagnostic results from 5/7/2022 were reviewed.    Medications:  Medication list was reviewed and changes noted under Assessment/Plan.  No current facility-administered medications on file prior to encounter.     Current Outpatient Medications on File Prior to Encounter   Medication Sig Dispense Refill    amLODIPine (NORVASC) 10 MG tablet Take 10 mg by mouth once daily.      atorvastatin (LIPITOR) 20 MG tablet 1 tablet (20 mg total) by Per G Tube route once daily. 90 tablet 3    glycopyrrolate (CUVPOSA) 1 mg/5 mL (0.2 mg/mL) Soln Take 5 mLs (1 mg total) by mouth 3 (three) times daily as needed (TO REDUCE SECRETIONS). 473 mL 1    acetaminophen (TYLENOL) 325 MG tablet 2 tablets (650 mg total) by Per G Tube route every 6 (six) hours.  0    albuterol-ipratropium (DUO-NEB) 2.5 mg-0.5 mg/3 mL nebulizer solution Take 3 mLs by nebulization every 4 (four) hours as needed for Wheezing. Rescue 75 mL 0    aspirin 81 MG Chew 1 tablet (81 mg total) by Per G Tube route once daily.  0    bisacodyL (DULCOLAX) 10 mg Supp Place 1 suppository (10 mg total) rectally daily as needed.  0     clopidogreL (PLAVIX) 75 mg tablet 1 tablet (75 mg total) by Per G Tube route once daily. 30 tablet 11    duke's soln (benadryl 30 mL, mylanta 30 mL, LIDOcaine 30 mL, nystatin 30 mL) 120mL Take 10 mLs by mouth 4 (four) times daily as needed (mucositis). 360 mL 0    enoxaparin (LOVENOX) 40 mg/0.4 mL Syrg Inject 0.4 mLs (40 mg total) into the skin once daily.      folic acid (FOLVITE) 1 MG tablet 1 tablet (1 mg total) by Per G Tube route once daily.  0    guaiFENesin 200 mg/5 mL Liqd Take 400 mg by mouth every 4 (four) hours as needed (for secretions). 473 mL 3    losartan (COZAAR) 50 MG tablet 1 tablet (50 mg total) by Per G Tube route once daily. 90 tablet 3    melatonin (MELATIN) 3 mg tablet 2 tablets (6 mg total) by Per G Tube route nightly.  0    metoprolol tartrate (LOPRESSOR) 100 MG tablet 1 tablet (100 mg total) by Per G Tube route 2 (two) times daily. 60 tablet 11    ondansetron (ZOFRAN-ODT) 8 MG TbDL Take 1 tablet (8 mg total) by mouth every 8 (eight) hours as needed (nausea). 30 tablet 1    oxyCODONE (ROXICODONE) 5 mg/5 mL Soln 5 mLs (5 mg total) by Per G Tube route every 4 (four) hours as needed (Pain). 150 mL 0    polyethylene glycol (GLYCOLAX) 17 gram PwPk 17 g by Per G Tube route 2 (two) times daily.  0    senna-docusate 8.6-50 mg (PERICOLACE) 8.6-50 mg per tablet 1 tablet by Per G Tube route 2 (two) times daily.      sodium chloride (OCEAN) 0.65 % nasal spray 1 spray by Nasal route as needed for Congestion.  12    sodium chloride 3% 3 % nebulizer solution Take 4 mLs by nebulization every 8 (eight) hours.      thiamine 100 MG tablet 1 tablet (100 mg total) by Per G Tube route once daily.       Scheduled Medications:  albuterol-ipratropium, 3 mL, Nebulization, Q4H  aspirin, 81 mg, Per G Tube, Daily  atorvastatin, 20 mg, Per G Tube, Daily  clopidogreL, 75 mg, Per G Tube, Daily  enoxaparin, 40 mg, Subcutaneous, Daily  folic acid, 1 mg, Per G Tube, Daily  guaiFENesin 100 mg/5 ml, 200 mg, Per G  Tube, Q8H  melatonin, 6 mg, Per G Tube, Nightly  polyethylene glycol, 17 g, Per G Tube, BID  scopolamine, 1 patch, Transdermal, Q3 Days  thiamine, 100 mg, Per G Tube, Daily      PRN: sodium chloride, albuterol-ipratropium, bisacodyL, glycopyrrolate, ondansetron, oxyCODONE, sodium chloride, sodium chloride 0.9%  Infusions:   Estimated Creatinine Clearance: 83 mL/min (based on SCr of 0.7 mg/dL).    Assessment/Plan:      * Acute on chronic respiratory failure  Pt with SCC of the tongue s/p total glossectomy, chemowith cisplatin, and nearing completion of radiation with trach, COPD, asthma, HTN, CAD s/p PCI presenting for acute on chronic hypoxemic respiratory failure. He is on 5L with trach collar at home. He has had increased yellow secretions, work of breathing and SOB for the past several days. BNP elevated to 1600 on admission with pulmonary edema and new cardiomegaly on CXR. Trop wnl. Bicarb is elevated suggesting chronic hypercapnia. Suctioning has cleared thick mucous. He was able to be weaned down to his home O2, however after ~ 20 minutes he desats again, requiring deep suction with resolution of hypoxia. Now stable on home O2. Diuresed -3L     -Pt stable on home O2 for 24 hours (5 liters per NC), ready to stepdown to floor  - CAP coverage with ceftriaxone/azithro  - Methylprednisolone 40 mg x5 days for possible element of COPD exacerbation  - Duonebs q4  - Hypertonic saline nebs  - Chest PT  - Suction PRN    4/21 Transfer to hospital medicine . 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  initially requiring vent but has now been stable on home trach collar for 24 hours. sats 93% on 8L via trach collar.  Awaiting cultures from sputum, on CAP coverage with ceftriaxone. completed Azithromycin. continue solumedrol 40mg IV daily.   4/22 sats 95% on trach collar 10/ fio2 60% . completed  azithromycin.   started on Zosyn for possible aspiration  repeat CX ray-in the inferior hemithorax on the left side consistent with airspace consolidation/volume loss in the left lower lobe is again appreciated, but the lung zones are essentially stable since the prior exam and demonstrate no new areas of airspace consolidation or volume loss.  respiratory culture 4/19 with pseudomonas.     4/30-  oxygen tapered to 8L/ Fio2 35% .  Needs to be at 5 liters to go home. Add guaifenisen for cough and secertion. Up in chair as much as possible  5/1 - not able to wean oxygen.  He may need a LTAC for HF oxygen.   5/3 - on 8 liters NC ( home goal is 5 liters)  5/4 - Now chronic, repeat cxr shows RLL pneumonia. Resume vanc , zosyn. Consult pulm  Up in chair helps to lower rate. He desats at night. This pt likely needs a LTAC for HF oxygen. Insurance company did not call me for a peer to peer yesterday.    5/5   sats 93 % on 8L NC/ fio2 35%. tapered to 5L/28% needs LTAC for HF oxygen.  repeated a cxray 5/4 - Interval development of increased opacification within the right lung base, may reflect developing infiltrate versus atelectasis. started antibiotics- vanc and zosyn. monitor for vancomycin toxity. plan to switch to doxycycline. Consider pulmonary eval .  just completed  course of zosyn 4/29 5/6 tolerated 6L NC/ fio2 35% overnight . peer to peer with insurane today. EKG with sinus tachycardia. discussed [facility] pulmonology. monitor off antibiotics zosyn and vancomcyn as he completed adequate course of antibiotics for pseudomas and aspiration. CX ray improvement may lag behind treatment   5/7 stable on 5L/ fio2 28% overnight.      Goals of care, counseling/discussion  Advance Care Planning      Does patient have Capacity? Yes  Contact: Bridgett mitchell, wife  Goals/Wishes: wants to go home, HH w Home PT  Code Status- FULL  Pain management- seems comfortable without pain  Prognosis- s/p radiation, progressing cancer, high risk for  aspiration, severe malnutrition  Family discussions-  4/28, 4/29 - discussed care and condition w pt and his wife.  He can talk w trach collar but sats drop.  He wants to go home. Has all oxygen supplies at home. Pt completed zosyn.   5/2 - spoke to his wife. Reviewed his care and condition. She was able to calm him down. She is encouraging him. Will start remeron for appetite and depression. She is working hard to keep him comfortable and well-cared for.  Functional Status - has trach and peg. recommended outpt f/u palliative care    Post acute care plan: pt would benefit from outpt referral to Palliative care, plan for HH and home.    5/4 - not able to wean oxygen.  He needs a LTAC for HF oxygen. Waiting on ins approval      Hypernatremia  4/25 sodium 150. started on D5W . repeat renal panel in PM   5/4- sodium trended down to 145 -> 140, on TFs -> 138 -> 139, not receiving extra water in TFs -> 141    Hypophosphatemia  Replaced  5/4- stable      Aspiration pneumonia  4/22 sats 95% on trach collar 10/ fio2 60% . completed azithromycin.   started on Zosyn for possible aspiration  repeat CX ray-in the inferior hemithorax on the left side consistent with airspace consolidation/volume loss in the left lower lobe is again appreciated, but the lung zones are essentially stable since the prior exam and demonstrate no new areas of airspace consolidation or volume loss.  respiratory culture 4/19 with pseudomonas.   -  completed zosyn  5/4 - new infiltrate RLL. Resume zosyn, vanc, consult pulm     Sinus tachycardia        Dysphagia  Nutrition consulted. Most recent weight and BMI monitored-          Malnutrition (Moderate to Severe)  Weight Loss (Malnutrition): greater than 10% in 6 months                 Measurements:  Wt Readings from Last 1 Encounters:   05/04/22 62.4 kg (137 lb 9.1 oz)   Body mass index is 20.92 kg/m².    Recommendations: Recommendation/Intervention: 1.  Goals: Meet % EEN, EPN by RD f/u  date    Patient has been screened and assessed by RD. RD will follow patient.    4/21 secondary to above. on G tube feedings   4/22 changed to bolus GT feedings.   4/29 - pt noted to sometimes refuse TFs. , on bolus TFs, no water added      Hypokalemia  replaced   5/4 - stable      Chronic respiratory failure with hypoxia, on home O2 therapy  secondary to COPD. on 5L oxygen  at home  5/4  - continue weaning efforts, currently on 8 liters  5/5  past week not able to wean oxygen.   sats 93 % on 8L NC/ fio2 35%. needs LTAC for HF oxygen.  repeated a cxray 5/4 - Interval development of increased opacification within the right lung base, may reflect developing infiltrate versus atelectasis. started antibiotics- vanc and zosyn.  Consulted pulmonary.  just completed  course of zosyn 4/29     Protein-calorie malnutrition  Nutrition consulted. Most recent weight and BMI monitored-          Malnutrition (Moderate to Severe)  Weight Loss (Malnutrition): greater than 10% in 6 months              Measurements:  Wt Readings from Last 1 Encounters:   05/04/22 62.4 kg (137 lb 9.1 oz)   Body mass index is 20.92 kg/m².    Recommendations: Recommendation/Intervention: 1.  Goals: Meet % EEN, EPN by RD f/u date    Patient has been screened and assessed by RD. RD will follow patient.  See above      Normocytic anemia    Patient's with Normocytic anemia.. Hemoglobin stable. Etiology likely due to chronic disease .  Current CBC reviewed-    Recent Labs   Lab 05/02/22  0632 05/03/22  0457 05/04/22  0500   HGB 9.5* 9.5* 9.4*     Monitor CBC and transfuse if H/H drops below 7/21.   - improving      Other hyperlipidemia  Continue home statin         Primary hypertension  Chronic, controlled.  Latest blood pressure and vitals reviewed-   Temp:  [97.3 °F (36.3 °C)-98.5 °F (36.9 °C)]   Pulse:  []   Resp:  [17-25]   BP: ()/(64-81)   SpO2:  [89 %-97 %] .   Home meds for hypertension were reviewed- amlodipine, losartan and  metoprolol  held for now  PRN meds if BP> 180/110 mm HG  5/4 - not requiring scheduled meds      Coronary artery disease involving native coronary artery of native heart without angina pectoris  Continue ASA, plavix, and statin   -stable    Squamous cell cancer of tongue    12/15/21 FNA left neck mass : squamous cell carcinoma, p16 negative  1/4/22 total glossectomy, bilateral neck dissection, bilateral cervical facial advancement flaps and anterolateral thigh free flap reconstruction of his glossectomy defect.  Final path :  [id number] (+microscopic SALINAS, single contralateral node), superior soft tissue margin of left neck with tumor.  2/28/22 start chemoradiation  Finished chemo with cisplatin 4/6. Was going to complete final dose of radiation tomorrow.     -Rad/onc consulted for radiation while inpatient  4/22  s/p radiation oncology eval -on adjuvant chemoradiation, completed 31/33 fractions of RT.  renitiation of RT  inpatient when patient able to tolerate.    4/23  completed radiation sessions  5/4 -attempting to  wean oxygen, takes 5 liters at home and has all supplies.     VTE Risk Mitigation (From admission, onward)         Ordered     enoxaparin injection 40 mg  Daily         04/19/22 1123     IP VTE HIGH RISK PATIENT  Once         04/19/22 1123     Place sequential compression device  Until discontinued         04/19/22 1123                Discharge Planning   NISA: 5/8/2022     Code Status: Full Code   Is the patient medically ready for discharge?: Yes    Reason for patient still in hospital (select all that apply): Treatment  Discharge Plan A: Long-term acute care facility (LTAC)   Discharge Delays: None known at this time              Jordan Armenta MD  Department of Hospital Medicine   [facility] - Telemetry Stepdown

## 2022-05-07 NOTE — NURSING
2000  Received report for pt from AM Nurse. Pt resting in bed. AAOx4. Trach; 5L 28% FIO2. VSS. PEG tube in place. Education provided regarding fall and safety precaution provided. Safety check performed. Call bell within reach. Will continue to monitor.    0000  Pt resting in bed. Medication administered per MAR. Safety check performed. Call bell within reach. Will continue to monitor.

## 2022-05-07 NOTE — PLAN OF CARE
Problem: Adult Inpatient Plan of Care  Goal: Plan of Care Review  Outcome: Ongoing, Progressing  Goal: Patient-Specific Goal (Individualized)  Outcome: Ongoing, Progressing  Goal: Absence of Hospital-Acquired Illness or Injury  Outcome: Ongoing, Progressing  Goal: Optimal Comfort and Wellbeing  Outcome: Ongoing, Progressing  Goal: Readiness for Transition of Care  Outcome: Ongoing, Progressing     Problem: Impaired Wound Healing  Goal: Optimal Wound Healing  Outcome: Ongoing, Progressing     Problem: Skin Injury Risk Increased  Goal: Skin Health and Integrity  Outcome: Ongoing, Progressing    Pt in bed with eyes open, Aox4, no distress noted, remains on 5L trach collar, denies pain, bolus feedings continued, no residual noted, VSS, bed locked in lowest position, call bell in each, instructed to call when assistance is needed.

## 2022-05-08 LAB
ALBUMIN SERPL BCP-MCNC: 2.6 G/DL (ref 3.5–5.2)
ALP SERPL-CCNC: 79 U/L (ref 55–135)
ALT SERPL W/O P-5'-P-CCNC: 12 U/L (ref 10–44)
ANION GAP SERPL CALC-SCNC: 7 MMOL/L (ref 8–16)
AST SERPL-CCNC: 14 U/L (ref 10–40)
BASOPHILS # BLD AUTO: 0.03 K/UL (ref 0–0.2)
BASOPHILS NFR BLD: 0.7 % (ref 0–1.9)
BILIRUB SERPL-MCNC: 0.2 MG/DL (ref 0.1–1)
BUN SERPL-MCNC: 16 MG/DL (ref 8–23)
CALCIUM SERPL-MCNC: 9.8 MG/DL (ref 8.7–10.5)
CHLORIDE SERPL-SCNC: 98 MMOL/L (ref 95–110)
CO2 SERPL-SCNC: 31 MMOL/L (ref 23–29)
CREAT SERPL-MCNC: 0.6 MG/DL (ref 0.5–1.4)
DIFFERENTIAL METHOD: ABNORMAL
EOSINOPHIL # BLD AUTO: 0.1 K/UL (ref 0–0.5)
EOSINOPHIL NFR BLD: 3.3 % (ref 0–8)
ERYTHROCYTE [DISTWIDTH] IN BLOOD BY AUTOMATED COUNT: 16.1 % (ref 11.5–14.5)
EST. GFR  (AFRICAN AMERICAN): >60 ML/MIN/1.73 M^2
EST. GFR  (NON AFRICAN AMERICAN): >60 ML/MIN/1.73 M^2
GLUCOSE SERPL-MCNC: 96 MG/DL (ref 70–110)
HCT VFR BLD AUTO: 31.8 % (ref 40–54)
HGB BLD-MCNC: 9.9 G/DL (ref 14–18)
IMM GRANULOCYTES # BLD AUTO: 0.02 K/UL (ref 0–0.04)
IMM GRANULOCYTES NFR BLD AUTO: 0.5 % (ref 0–0.5)
LYMPHOCYTES # BLD AUTO: 0.7 K/UL (ref 1–4.8)
LYMPHOCYTES NFR BLD: 15.8 % (ref 18–48)
MAGNESIUM SERPL-MCNC: 2 MG/DL (ref 1.6–2.6)
MCH RBC QN AUTO: 29.9 PG (ref 27–31)
MCHC RBC AUTO-ENTMCNC: 31.1 G/DL (ref 32–36)
MCV RBC AUTO: 96 FL (ref 82–98)
MONOCYTES # BLD AUTO: 0.6 K/UL (ref 0.3–1)
MONOCYTES NFR BLD: 13 % (ref 4–15)
NEUTROPHILS # BLD AUTO: 2.8 K/UL (ref 1.8–7.7)
NEUTROPHILS NFR BLD: 66.7 % (ref 38–73)
NRBC BLD-RTO: 0 /100 WBC
PHOSPHATE SERPL-MCNC: 4.1 MG/DL (ref 2.7–4.5)
PLATELET # BLD AUTO: 226 K/UL (ref 150–450)
PMV BLD AUTO: 9.2 FL (ref 9.2–12.9)
POTASSIUM SERPL-SCNC: 4.3 MMOL/L (ref 3.5–5.1)
PROT SERPL-MCNC: 6.6 G/DL (ref 6–8.4)
RBC # BLD AUTO: 3.31 M/UL (ref 4.6–6.2)
SODIUM SERPL-SCNC: 136 MMOL/L (ref 136–145)
WBC # BLD AUTO: 4.24 K/UL (ref 3.9–12.7)

## 2022-05-08 PROCEDURE — 85025 COMPLETE CBC W/AUTO DIFF WBC: CPT | Performed by: HOSPITALIST

## 2022-05-08 PROCEDURE — 99232 SBSQ HOSP IP/OBS MODERATE 35: CPT | Mod: ,,, | Performed by: HOSPITALIST

## 2022-05-08 PROCEDURE — 94640 AIRWAY INHALATION TREATMENT: CPT

## 2022-05-08 PROCEDURE — 99232 PR SUBSEQUENT HOSPITAL CARE,LEVL II: ICD-10-PCS | Mod: ,,, | Performed by: HOSPITALIST

## 2022-05-08 PROCEDURE — 94761 N-INVAS EAR/PLS OXIMETRY MLT: CPT

## 2022-05-08 PROCEDURE — 25000003 PHARM REV CODE 250: Performed by: STUDENT IN AN ORGANIZED HEALTH CARE EDUCATION/TRAINING PROGRAM

## 2022-05-08 PROCEDURE — 25000242 PHARM REV CODE 250 ALT 637 W/ HCPCS: Performed by: INTERNAL MEDICINE

## 2022-05-08 PROCEDURE — 36415 COLL VENOUS BLD VENIPUNCTURE: CPT | Performed by: HOSPITALIST

## 2022-05-08 PROCEDURE — 20600001 HC STEP DOWN PRIVATE ROOM

## 2022-05-08 PROCEDURE — 99900035 HC TECH TIME PER 15 MIN (STAT)

## 2022-05-08 PROCEDURE — 84100 ASSAY OF PHOSPHORUS: CPT | Performed by: HOSPITALIST

## 2022-05-08 PROCEDURE — 83735 ASSAY OF MAGNESIUM: CPT | Performed by: HOSPITALIST

## 2022-05-08 PROCEDURE — 99900026 HC AIRWAY MAINTENANCE (STAT)

## 2022-05-08 PROCEDURE — 94668 MNPJ CHEST WALL SBSQ: CPT

## 2022-05-08 PROCEDURE — 25000242 PHARM REV CODE 250 ALT 637 W/ HCPCS: Performed by: STUDENT IN AN ORGANIZED HEALTH CARE EDUCATION/TRAINING PROGRAM

## 2022-05-08 PROCEDURE — 63600175 PHARM REV CODE 636 W HCPCS: Performed by: INTERNAL MEDICINE

## 2022-05-08 PROCEDURE — 27000221 HC OXYGEN, UP TO 24 HOURS

## 2022-05-08 PROCEDURE — 80053 COMPREHEN METABOLIC PANEL: CPT | Performed by: HOSPITALIST

## 2022-05-08 PROCEDURE — 25000003 PHARM REV CODE 250: Performed by: HOSPITALIST

## 2022-05-08 RX ADMIN — ENOXAPARIN SODIUM 40 MG: 40 INJECTION SUBCUTANEOUS at 05:05

## 2022-05-08 RX ADMIN — THIAMINE HCL TAB 100 MG 100 MG: 100 TAB at 08:05

## 2022-05-08 RX ADMIN — GLYCOPYRROLATE 2 MG: 1 LIQUID ORAL at 08:05

## 2022-05-08 RX ADMIN — GUAIFENESIN 200 MG: 100 SOLUTION ORAL at 06:05

## 2022-05-08 RX ADMIN — CLOPIDOGREL 75 MG: 75 TABLET, FILM COATED ORAL at 08:05

## 2022-05-08 RX ADMIN — IPRATROPIUM BROMIDE AND ALBUTEROL SULFATE 3 ML: 2.5; .5 SOLUTION RESPIRATORY (INHALATION) at 04:05

## 2022-05-08 RX ADMIN — ASPIRIN 81 MG CHEWABLE TABLET 81 MG: 81 TABLET CHEWABLE at 08:05

## 2022-05-08 RX ADMIN — ATORVASTATIN CALCIUM 20 MG: 20 TABLET, FILM COATED ORAL at 08:05

## 2022-05-08 RX ADMIN — IPRATROPIUM BROMIDE AND ALBUTEROL SULFATE 3 ML: 2.5; .5 SOLUTION RESPIRATORY (INHALATION) at 12:05

## 2022-05-08 RX ADMIN — MELATONIN TAB 3 MG 6 MG: 3 TAB at 09:05

## 2022-05-08 RX ADMIN — FOLIC ACID 1 MG: 1 TABLET ORAL at 08:05

## 2022-05-08 RX ADMIN — IPRATROPIUM BROMIDE AND ALBUTEROL SULFATE 3 ML: 2.5; .5 SOLUTION RESPIRATORY (INHALATION) at 08:05

## 2022-05-08 RX ADMIN — OXYCODONE HYDROCHLORIDE 5 MG: 5 SOLUTION ORAL at 08:05

## 2022-05-08 NOTE — NURSING
Pt in bed with eyes open, AOx4, able to communicate needs, no c/o pain, no distress noted, bolus feedings continued, no residual noted, remains on 5L 28% trach collar, bed locked in lowest position, call bell in reach, instructed to call when assistance needed

## 2022-05-08 NOTE — PLAN OF CARE
Problem: Adult Inpatient Plan of Care  Goal: Plan of Care Review  Outcome: Ongoing, Progressing  Goal: Patient-Specific Goal (Individualized)  Outcome: Ongoing, Progressing  Goal: Absence of Hospital-Acquired Illness or Injury  Outcome: Ongoing, Progressing  Goal: Optimal Comfort and Wellbeing  Outcome: Ongoing, Progressing  Goal: Readiness for Transition of Care  Outcome: Ongoing, Progressing     Problem: Impaired Wound Healing  Goal: Optimal Wound Healing  Outcome: Ongoing, Progressing     Problem: Skin Injury Risk Increased  Goal: Skin Health and Integrity  Outcome: Ongoing, Progressing     Problem: Communication Impairment (Artificial Airway)  Goal: Effective Communication  Outcome: Ongoing, Progressing     Problem: Device-Related Complication Risk (Artificial Airway)  Goal: Optimal Device Function  Outcome: Ongoing, Progressing     Problem: Skin and Tissue Injury (Artificial Airway)  Goal: Absence of Device-Related Skin or Tissue Injury  Outcome: Ongoing, Progressing     Problem: Noninvasive Ventilation Acute  Goal: Effective Unassisted Ventilation and Oxygenation  Outcome: Ongoing, Progressing     Problem: Fall Injury Risk  Goal: Absence of Fall and Fall-Related Injury  Outcome: Ongoing, Progressing

## 2022-05-08 NOTE — RESPIRATORY THERAPY
RAPID RESPONSE RESPIRATORY THERAPY PROACTIVE NOTE           Time of visit: 929     Code Status: Full Code   : 1950  Bed: 8094/8094 A:   MRN: [id number]  Time spent at the bedside: < 15 min    SITUATION    Evaluated patient for: LDA Check     BACKGROUND    Patient has a past medical history of Cancer, COPD (chronic obstructive pulmonary disease), Hyperlipidemia, Hypertension, and Pseudoaneurysm.  Clinically Significant Surgical Hx: tracheostomy    24 Hours Vitals Range:  Temp:  [96.4 °F (35.8 °C)-98.4 °F (36.9 °C)]   Pulse:  []   Resp:  [18-26]   BP: (136-164)/(68-91)   SpO2:  [91 %-99 %]     Labs:    Recent Labs     22  0509 22  0659 22  0655    139 136   K 4.0 4.4 4.3    99 98   CO2 31* 31* 31*   CREATININE 0.7 0.7 0.6   * 99 96   PHOS 3.4 3.7 4.1   MG 2.0 2.0 2.0        No results for input(s): PH, PCO2, PO2, HCO3, POCSATURATED, BE in the last 72 hours.    ASSESSMENT/INTERVENTIONS    Upon arrival in room Pt. Resting comfortably with no respiratory needs at this time.    Last VS   Temp: 98 °F (36.7 °C) (725)  Pulse: 100 (927)  Resp: 19 (725)  BP: 150/91 (725)  SpO2: 94 % (927)    Level of Consciousness: Level of Consciousness (AVPU): alert  Respiratory Effort: Respiratory Effort: Unlabored Expansion/Accessory Muscle Usage: Expansion/Accessory Muscles/Retractions: no use of accessory muscles, no retractions, expansion symmetric  All Lung Field Breath Sounds: All Lung Fields Breath Sounds: Anterior:, Lateral:, coarse, equal bilaterally  DEE Breath Sounds: coarse, diminished  LLL Breath Sounds: diminished  RUL Breath Sounds: coarse, diminished  RML Breath Sounds: diminished  RLL Breath Sounds: diminished  O2 Device/Concentration: Flow (L/min): 5, Oxygen Concentration (%): 28, O2 Device (Oxygen Therapy): Trach Collar  Surgical airway: Yes, Type: Shiley Size: 6, uncuffed  Ambu at bedside: Ambu bag with the patient?: Yes, Adult Ambu      Active Orders   Respiratory Care    Chest physiotherapy TID     Frequency: TID     Number of Occurrences: Until Specified     Order Questions:      Indications: COPIOUS SPUTUM PRODUCTION      Indications: ATELECTASIS PROPHYLAXIS      Indications: ATELECTASIS NONRESPONSIVE TO TX    Inhalation Treatment Q4H     Frequency: Q4H     Number of Occurrences: Until Specified    Oxygen Continuous     Frequency: Continuous     Number of Occurrences: Until Specified     Order Questions:      Device type: Low flow      Device: Trach Collar (Comment: 8L)      FiO2%: 35%      Titrate O2 per Oxygen Titration Protocol: Yes      To maintain SpO2 goal of: >= 90%      Notify MD of: Inability to achieve desired SpO2; Sudden change in patient status and requires 20% increase in FiO2; Patient requires >60% FiO2    Pulse Oximetry Continuous     Frequency: Continuous     Number of Occurrences: Until Specified    Routine tracheostomy care     Frequency: BID     Number of Occurrences: Until Specified       RECOMMENDATIONS    We recommend: RRT Recs: Continue POC per primary team.    ESCALATION        FOLLOW-UP    Please call back the Rapid Response RT, Tyrese Foster RRT at x [phone] for any questions or concerns.

## 2022-05-08 NOTE — PLAN OF CARE
Problem: Adult Inpatient Plan of Care  Goal: Plan of Care Review  5/8/2022 0759 by Fior Gore RN  Outcome: Ongoing, Progressing  5/7/2022 1837 by Fior Gore RN  Outcome: Ongoing, Progressing  Goal: Patient-Specific Goal (Individualized)  5/8/2022 0759 by iFor Gore RN  Outcome: Ongoing, Progressing  5/7/2022 1837 by Fior Gore RN  Outcome: Ongoing, Progressing  Goal: Absence of Hospital-Acquired Illness or Injury  5/8/2022 0759 by Fior Gore RN  Outcome: Ongoing, Progressing  5/7/2022 1837 by Fior Gore RN  Outcome: Ongoing, Progressing  Goal: Optimal Comfort and Wellbeing  5/8/2022 0759 by Fior Gore RN  Outcome: Ongoing, Progressing  5/7/2022 1837 by Fior Gore RN  Outcome: Ongoing, Progressing  Goal: Readiness for Transition of Care  5/8/2022 0759 by Fior Gore RN  Outcome: Ongoing, Progressing  5/7/2022 1837 by Fior Gore RN  Outcome: Ongoing, Progressing     Problem: Impaired Wound Healing  Goal: Optimal Wound Healing  5/8/2022 0759 by Fior Gore RN  Outcome: Ongoing, Progressing  5/7/2022 1837 by Fior Gore RN  Outcome: Ongoing, Progressing     Problem: Skin Injury Risk Increased  Goal: Skin Health and Integrity  5/8/2022 0759 by Fior Gore RN  Outcome: Ongoing, Progressing  5/7/2022 1837 by Fior Gore RN  Outcome: Ongoing, Progressing     Problem: Communication Impairment (Artificial Airway)  Goal: Effective Communication  Outcome: Ongoing, Progressing    Pt in bed with eyes open, AOX4, able to communicate needs via writing and hand gestures, VSS, denies pain, no distress noted, trach collar on 5L and 28% Fi02, bed locked in lowest position, call bell in reach, instructed to call when assistance needed.

## 2022-05-08 NOTE — PROGRESS NOTES
Pasha Cherry - Telemetry [facility] Medicine  Progress Note    Patient Name: Fareed Richard Jr.  MRN: [id number]  Patient Class: IP- Inpatient   Admission Date: 4/19/2022  Length of Stay: 19 days  Attending Physician: Jordan Armenta MD  Primary Care Provider: Kodi Tubbs MD        Subjective:     Principal Problem:Acute on chronic respiratory failure        HPI:  Mr. 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 Per wife he has had several days of increasing lethargy, increased work of breathing and secretions. Yesterday she became particularly concerned when he became slightly less responsive and interactive. Wife also notes increased yellow thick secretions from trach site. Upon EMS arrival he was on his home 5 L by trach collar and saturating 93% with significant wheezing.  He received 1 DuoNeb by them.  He was also suctioned.     Currently patient is alert, following commands.  He is responding to yes no questions.  He denies any chest pain but does endorse shortness of breath and cough.  He denies any abdominal pain or pain elsewhere       ED course:   He was given 500ccs IVF as well as one time doses of vanc/cefepime/azithro. Labs significant for BNP of 1600, WBC 5k, K 5.6, Bicarb 35, VBG 7.26/87/25/40. Trop wnl. CXR with new cardiomegaly and pulmonary edema. Suctioned with return of copious secretions. ICU was consulted for acute hypoxemic respiratory failure and he will be admitted for further management.              Overview/Hospital Course:    Admitted to ICU, started on CAP coverage and solumedrol. Quickly weaned off vent after return of copious secretions on suction. Has been maintaining sats well on home O2. Rad/onc consulted for completion of radiology while inpatient. Pending culture sensitivity.    4/21 Transfer to hospital  medicine . 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  initially requiring vent but has now been stable on home trach collar for 24 hours. sats 93% on 8L via trach collar.  Awaiting cultures from sputum, on CAP coverage with ceftriaxone. completed Azithromycin. continue solumedrol 40mg IV daily.  rad/onc consulted this morning-he was supposed to complete radiation outpt but was admitted, attempting to set up for inpatient  4/22 changed to bolus GT feedings. s/p radiation oncology eval -on adjuvant chemoradiation, completed 31/33 fractions of RT.  renitiation of RT  inpatient when patient able to tolerate. sats 95% on trach collar 10/ fio2 60% . completed azithromycin.  started on Zosyn for possible aspiration  repeat CX ray-in the inferior hemithorax on the left side consistent with airspace consolidation/volume loss in the left lower lobe is again appreciated, but the lung zones are essentially stable since the prior exam and demonstrate no new areas of airspace consolidation or volume loss. respiratory culture 4/19 with pansensitive  pseudomonas.     4/23 intermittently refusing bolus GT feedings.stable on 10L/60% Fio2 . completed radiation sessions. off solumedrol  today. PT recs SNF  4/24 K and P replaced   4/25 sodium 150. started on D5W . repeat renal panel in PM . oxygen tapered to 8L/ Fio2 35%   4/26 K of 3.2  replaced   4/27 stable on 8L/ Fio2 35% . mucous plug remvove by suction , wants glycopyrrolate PRN due to dry mouth   Interval History:   4/28 - has trach, G tube, NPO, completed radiation treatments, 92% on 8 liters,+ stool and urinations. Weaning oxygen. Wants to go home w HH. Discussed his care and condition with his wife.    4/29 - still requiring 8 liters per NC. Sats low 90s. I lowered oxygen to 5 liters while in the room and he tolerated it.  Keep weaning.   4/30 - back up  to 8 liters per NC, refused some Bolus TFs yesterday  5/1 - not able to wean oxygen.  He may need a LTAC for HF oxygen. Otherwise, progressing,. Sat up in chair yesterday, + urinations and BM.   5/2 - on 8 liters per NC this am. 92%.  His home goal is 5 liters. Pt tends to need more at night.  Plan is to keep him overnight on 5 liters to make sure he is safe for home oxygen.  He may benefit from LTAC a few days. Accepted to LTAC, waiting on ins auth.  Needs PT/OT for deconditioning.  5/3 -  8 liters, up in chair a am, monitor % meals, on nebs q 4 hours. PT/OT, trach care and suctioning. LTAC referrals sent.   5/4 - nurses unable to keep flow rate below 8 overnight, despite coaching. His volume status is stable. Repeat cxr. - Interval development of increased opacification within the right lung base, may reflect developing infiltrate versus atelectasis.  Small bilateral pleural effusions, increased on the left as compared to prior.  5/5  past week not able to wean oxygen.   sats 93 % on 8L NC/ fio2 35%. tapered to 5L/28% needs LTAC for HF oxygen.  repeated a cxray 5/4 - Interval development of increased opacification within the right lung base, may reflect developing infiltrate versus atelectasis. started antibiotics- vanc and zosyn.  Consider pulmonary eval .  just completed  course of zosyn 4/29 5/6 tolerated 6L NC/ fio2 35% overnight . peer to peer with insurane today. EKG with sinus tachycardia   discussed [facility] pulmonology. monitor off antibiotics zosyn and vancomcyn as he completed adequate course of antibiotics for pseudomas and aspiration. CX ray improvement may lag behind treatment  5/7 stable on 5L/ fio2 28% overnight .completed peer to peer with insurance today . awaiting decision   5/8  stable on 5L/ fio2 28%. denies symptoms          Review of Systems:   Pain scale:   Constitutional:  fever,  chills, headache, vision loss, hearing loss, weight loss, Generalized weakness, falls, loss of smell, loss of  taste, poor appetite,  sore throat, voice change,immunocompromised state.   Respiratory: cough, shortness of breath.   Cardiovascular: chest pain, dizziness, palpitations, orthopnea, swelling of feet, syncope  Gastrointestinal: nausea, vomiting, abdominal pain, diarrhea, black stool,  blood in stool, change in bowel habits  Genitourinary: hematuria, dysuria, urgency, frequency  Integument/Breast: rash,  pruritis  Hematologic/Lymphatic: easy bruising, lymphadenopathy  Musculoskeletal: arthralgias , myalgias, back pain, neck pain, knee pain  Neurological: confusion, seizures, tremors, slurred speech  Behavioral/Psych:  depression, anxiety, auditory or visual hallucinations        OBJECTIVE:     Physical Exam:  Body mass index is 20.92 kg/m².       Constitutional: Appears  thin built   Head: Normocephalic and atraumatic.   Neck: Normal range of motion. Neck supple. trach collar  Cardiovascular: Normal heart rate.  Regular heart rhythm.  Pulmonary/Chest: Effort normal.   Abdominal: No distension.  No tenderness, PEG tube insitu   Musculoskeletal: Normal range of motion. No edema.   Neurological: Alert and oriented to person, place, and time.   Skin: Skin is warm and dry.   Psychiatric: Normal mood and affect. Behavior is normal.       Vital Signs  Temp: 97.7 °F (36.5 °C) (05/08/22 1721)  Pulse: 95 (05/08/22 1721)  Resp: 18 (05/08/22 1721)  BP: 136/82 (05/08/22 1721)  SpO2: 95 % (05/08/22 1721)     24 Hour VS Range    Temp:  [97.7 °F (36.5 °C)-98.6 °F (37 °C)]   Pulse:  []   Resp:  [18-26]   BP: (136-164)/(68-91)   SpO2:  [93 %-98 %]     Intake/Output Summary (Last 24 hours) at 5/8/2022 1826  Last data filed at 5/8/2022 0927  Gross per 24 hour   Intake 375 ml   Output 350 ml   Net 25 ml         I/O This Shift:  I/O this shift:  In: 375 [NG/GT:375]  Out: -     Wt Readings from Last 3 Encounters:   05/04/22 62.4 kg (137 lb 9.1 oz)   04/06/22 63 kg (138 lb 14.2 oz)   04/04/22 63 kg (139 lb)       I have personally  reviewed the vitals and recorded Intake/Output     Laboratory/Diagnostic Data:    CBC/Anemia Labs: Coags:    Recent Labs   Lab 05/06/22  0509 05/07/22  0659 05/08/22  0655   WBC 4.62 5.01 4.24   HGB 9.0* 10.0* 9.9*   HCT 29.7* 32.8* 31.8*    215 226   MCV 98 94 96   RDW 16.6* 16.3* 16.1*    No results for input(s): PT, INR, APTT in the last 168 hours.     Chemistries: ABG:   Recent Labs   Lab 05/06/22  0509 05/07/22  0659 05/08/22  0655    139 136   K 4.0 4.4 4.3    99 98   CO2 31* 31* 31*   BUN 16 14 16   CREATININE 0.7 0.7 0.6   CALCIUM 8.9 9.2 9.8   PROT 6.1 6.1 6.6   BILITOT 0.2 0.2 0.2   ALKPHOS 70 78 79   ALT 11 11 12   AST 12 13 14   MG 2.0 2.0 2.0   PHOS 3.4 3.7 4.1    No results for input(s): PH, PCO2, PO2, HCO3, POCSATURATED, BE in the last 168 hours.     POCT Glucose: HbA1c:    No results for input(s): POCTGLUCOSE in the last 168 hours. No results found for: HGBA1C     Cardiac Enzymes: Ejection Fractions:    No results for input(s): CPK, CPKMB, MB, TROPONINI in the last 72 hours. EF   Date Value Ref Range Status   04/19/2022 60 % Final   12/30/2021 65 % Final          No results for input(s): COLORU, APPEARANCEUA, PHUR, SPECGRAV, PROTEINUA, GLUCUA, KETONESU, BILIRUBINUA, OCCULTUA, NITRITE, UROBILINOGEN, LEUKOCYTESUR, RBCUA, WBCUA, BACTERIA, SQUAMEPITHEL, HYALINECASTS in the last 48 hours.    Invalid input(s): WRIGHTSUR    Procalcitonin (ng/mL)   Date Value   04/19/2022 0.09     Lactate (Lactic Acid) (mmol/L)   Date Value   04/19/2022 1.0     BNP (pg/mL)   Date Value   04/22/2022 132 (H)   04/19/2022 1,607 (H)   03/21/2022 242 (H)   11/02/2019 260 (H)   12/27/2018 239 (H)     No results found for: CRP, SEDRATE  No results found for: DDIMER  Ferritin (ng/mL)   Date Value   02/16/2022 135     No results found for: LDH  Troponin I (ng/mL)   Date Value   04/19/2022 0.018   11/02/2019 0.016   12/27/2018 <0.006     No results found for this or any previous visit.  SARS-CoV2 (COVID-19)  Qualitative PCR (no units)   Date Value   05/02/2022 Not Detected   12/31/2021 Not Detected     SARS-CoV-2 RNA, Amplification, Qual (no units)   Date Value   01/13/2022 Negative   01/03/2022 Negative     POC Rapid COVID (no units)   Date Value   04/19/2022 Negative       Microbiology labs for the last week  Microbiology Results (last 7 days)     Procedure Component Value Units Date/Time    Culture, Respiratory with Gram Stain [[id number]]     Order Status: No result Specimen: Respiratory           Reviewed and noted in plan where applicable- Please see chart for full lab data.    Lines/Drains:       Peripheral IV - Single Lumen 04/25/22 2300 22 G Anterior;Distal;Left Upper Arm (Active)   Site Assessment Clean;Dry;Intact 05/05/22 0318   Extremity Assessment Distal to IV No abnormal discoloration 05/04/22 1100   Line Status Saline locked 05/04/22 1100   Dressing Status Clean;Dry;Intact 05/04/22 1100   Dressing Intervention Integrity maintained 05/04/22 1100   Dressing Change Due 04/28/22 04/28/22 0912   Site Change Due 04/26/22 04/28/22 0912   Reason Not Rotated Poor venous access 05/03/22 0735   Number of days: 9            Gastrostomy/Enterostomy 01/04/22 Percutaneous endoscopic gastrostomy (PEG) LUQ (Active)   Securement secured to abdomen 05/04/22 1100   Interventions Prior to Feeding patency checked;residual checked 05/04/22 1100   Feeding Type bolus 05/04/22 1100   Clamp Status/Tolerance clamped;no abdominal discomfort;no abdominal distention;no emesis;no nausea;no residual;no restlessness 05/04/22 1100   Feeding Action feeding continued 05/03/22 0735   Dressing dry and intact 05/04/22 1100   Insertion Site no redness;no warmth;no drainage;no tenderness;no swelling 05/04/22 1100   Site Care secured w/ tape 05/04/22 1100   Flush/Irrigation flushed w/;water;no resistance met 05/04/22 1100   Dressing Change Due 04/28/22 04/28/22 0912   Intake (mL) 60 mL 04/30/22 1250   Water Bolus (mL) 50 mL 05/03/22 1700   Tube  Output(mL)(Include Discarded Residual) 0 mL 04/28/22 1700   Formula Name Sarah 05/03/22 0735   Tube Feeding Intake (mL) 375 05/03/22 1700   Residual Amount (ml) 0 ml 05/04/22 1100   Length Of Feeding (Min) 15 04/28/22 0912   Number of days: 121       Imaging  ECG Results          EKG 12-lead (Final result)  Result time 04/19/22 17:42:02    Final result by Interface, Lab In [facility] (04/19/22 17:42:02)             Narrative:    Test Reason : R06.02,    Vent. Rate : 081 BPM     Atrial Rate : 081 BPM     P-R Int : 158 ms          QRS Dur : 092 ms      QT Int : 366 ms       P-R-T Axes : 061 -70 045 degrees     QTc Int : 425 ms    Normal sinus rhythm  Left axis deviation Now present  Incomplete right bundle branch block  Abnormal ECG  When compared with ECG of 10-MADONNA-2022 10:11,    Confirmed by DIONNE SINGH MD ([id number]) on 4/19/2022 5:41:52 PM    Referred By: [email or web address]   SELF           Confirmed By:DIONNE SINGH MD                            Results for orders placed during the hospital encounter of 04/19/22    Echo    Interpretation Summary  · The left ventricle is normal in size with normal systolic function. The estimated ejection fraction is 60%.  · Normal right ventricular size with normal right ventricular systolic function.  · Normal left ventricular diastolic function.  · The aortic root is mildly dilated.  · Mechanically ventilated; cannot use inferior caval vein diameter to estimate central venous pressure. The IVC is not enlarged and does collapse somewhat with the respiratory cycle, essentially ruling out venous hypertension.      X-Ray Chest AP Portable  Narrative: EXAMINATION:  XR CHEST AP PORTABLE    CLINICAL HISTORY:  hypoxia;    TECHNIQUE:  Single frontal view of the chest was performed.    COMPARISON:  Chest radiograph 04/22/2022, 04/21/2022, 04/19/2022.    FINDINGS:  Monitoring leads overlie the thorax.  There is a tracheostomy cannula in stable positioning.  The trachea is midline.   Cardiomediastinal silhouette is stable.  There is aortic plaque.  There are small bilateral pleural effusions, probably increased on the left.  Additionally, there is increased opacification over the right lung base, which may reflect atelectasis or developing infiltrate.  No pneumothorax.  The osseous structures appear intact.    There are postoperative changes in the right axillary region there is no evidence of free air beneath the hemidiaphragms.  Impression: Interval development of increased opacification within the right lung base, may reflect developing infiltrate versus atelectasis.    Small bilateral pleural effusions, increased on the left as compared to prior.    Electronically signed by resident: Tim Aparicio  Date:    05/04/2022  Time:    08:52    Electronically signed by: Daniel Álvarez MD  Date:    05/04/2022  Time:    09:12      Labs, Imaging, EKG and Diagnostic results from 5/8/2022 were reviewed.    Medications:  Medication list was reviewed and changes noted under Assessment/Plan.  No current facility-administered medications on file prior to encounter.     Current Outpatient Medications on File Prior to Encounter   Medication Sig Dispense Refill    amLODIPine (NORVASC) 10 MG tablet Take 10 mg by mouth once daily.      atorvastatin (LIPITOR) 20 MG tablet 1 tablet (20 mg total) by Per G Tube route once daily. 90 tablet 3    glycopyrrolate (CUVPOSA) 1 mg/5 mL (0.2 mg/mL) Soln Take 5 mLs (1 mg total) by mouth 3 (three) times daily as needed (TO REDUCE SECRETIONS). 473 mL 1    acetaminophen (TYLENOL) 325 MG tablet 2 tablets (650 mg total) by Per G Tube route every 6 (six) hours.  0    albuterol-ipratropium (DUO-NEB) 2.5 mg-0.5 mg/3 mL nebulizer solution Take 3 mLs by nebulization every 4 (four) hours as needed for Wheezing. Rescue 75 mL 0    aspirin 81 MG Chew 1 tablet (81 mg total) by Per G Tube route once daily.  0    bisacodyL (DULCOLAX) 10 mg Supp Place 1 suppository (10 mg total) rectally  daily as needed.  0    clopidogreL (PLAVIX) 75 mg tablet 1 tablet (75 mg total) by Per G Tube route once daily. 30 tablet 11    duke's soln (benadryl 30 mL, mylanta 30 mL, LIDOcaine 30 mL, nystatin 30 mL) 120mL Take 10 mLs by mouth 4 (four) times daily as needed (mucositis). 360 mL 0    enoxaparin (LOVENOX) 40 mg/0.4 mL Syrg Inject 0.4 mLs (40 mg total) into the skin once daily.      folic acid (FOLVITE) 1 MG tablet 1 tablet (1 mg total) by Per G Tube route once daily.  0    guaiFENesin 200 mg/5 mL Liqd Take 400 mg by mouth every 4 (four) hours as needed (for secretions). 473 mL 3    losartan (COZAAR) 50 MG tablet 1 tablet (50 mg total) by Per G Tube route once daily. 90 tablet 3    melatonin (MELATIN) 3 mg tablet 2 tablets (6 mg total) by Per G Tube route nightly.  0    metoprolol tartrate (LOPRESSOR) 100 MG tablet 1 tablet (100 mg total) by Per G Tube route 2 (two) times daily. 60 tablet 11    ondansetron (ZOFRAN-ODT) 8 MG TbDL Take 1 tablet (8 mg total) by mouth every 8 (eight) hours as needed (nausea). 30 tablet 1    oxyCODONE (ROXICODONE) 5 mg/5 mL Soln 5 mLs (5 mg total) by Per G Tube route every 4 (four) hours as needed (Pain). 150 mL 0    polyethylene glycol (GLYCOLAX) 17 gram PwPk 17 g by Per G Tube route 2 (two) times daily.  0    senna-docusate 8.6-50 mg (PERICOLACE) 8.6-50 mg per tablet 1 tablet by Per G Tube route 2 (two) times daily.      sodium chloride (OCEAN) 0.65 % nasal spray 1 spray by Nasal route as needed for Congestion.  12    sodium chloride 3% 3 % nebulizer solution Take 4 mLs by nebulization every 8 (eight) hours.      thiamine 100 MG tablet 1 tablet (100 mg total) by Per G Tube route once daily.       Scheduled Medications:  albuterol-ipratropium, 3 mL, Nebulization, Q4H  aspirin, 81 mg, Per G Tube, Daily  atorvastatin, 20 mg, Per G Tube, Daily  clopidogreL, 75 mg, Per G Tube, Daily  enoxaparin, 40 mg, Subcutaneous, Daily  folic acid, 1 mg, Per G Tube, Daily  guaiFENesin 100  mg/5 ml, 200 mg, Per G Tube, Q8H  melatonin, 6 mg, Per G Tube, Nightly  polyethylene glycol, 17 g, Per G Tube, BID  scopolamine, 1 patch, Transdermal, Q3 Days  thiamine, 100 mg, Per G Tube, Daily      PRN: sodium chloride, albuterol-ipratropium, bisacodyL, glycopyrrolate, ondansetron, oxyCODONE, sodium chloride, sodium chloride 0.9%  Infusions:   Estimated Creatinine Clearance: 96.8 mL/min (based on SCr of 0.6 mg/dL).    Assessment/Plan:      * Acute on chronic respiratory failure  Pt with SCC of the tongue s/p total glossectomy, chemowith cisplatin, and nearing completion of radiation with trach, COPD, asthma, HTN, CAD s/p PCI presenting for acute on chronic hypoxemic respiratory failure. He is on 5L with trach collar at home. He has had increased yellow secretions, work of breathing and SOB for the past several days. BNP elevated to 1600 on admission with pulmonary edema and new cardiomegaly on CXR. Trop wnl. Bicarb is elevated suggesting chronic hypercapnia. Suctioning has cleared thick mucous. He was able to be weaned down to his home O2, however after ~ 20 minutes he desats again, requiring deep suction with resolution of hypoxia. Now stable on home O2. Diuresed -3L     -Pt stable on home O2 for 24 hours (5 liters per NC), ready to stepdown to floor  - CAP coverage with ceftriaxone/azithro  - Methylprednisolone 40 mg x5 days for possible element of COPD exacerbation  - Duonebs q4  - Hypertonic saline nebs  - Chest PT  - Suction PRN    4/21 Transfer to hospital medicine . 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  initially requiring vent but has now been stable on home trach collar for 24 hours. sats 93% on 8L via trach collar.  Awaiting cultures from sputum, on CAP coverage with ceftriaxone. completed Azithromycin. continue solumedrol 40mg IV daily.   4/22 sats 95% on trach collar 10/  fio2 60% . completed azithromycin.   started on Zosyn for possible aspiration  repeat CX ray-in the inferior hemithorax on the left side consistent with airspace consolidation/volume loss in the left lower lobe is again appreciated, but the lung zones are essentially stable since the prior exam and demonstrate no new areas of airspace consolidation or volume loss.  respiratory culture 4/19 with pseudomonas.     4/30-  oxygen tapered to 8L/ Fio2 35% .  Needs to be at 5 liters to go home. Add guaifenisen for cough and secertion. Up in chair as much as possible  5/1 - not able to wean oxygen.  He may need a LTAC for HF oxygen.   5/3 - on 8 liters NC ( home goal is 5 liters)  5/4 - Now chronic, repeat cxr shows RLL pneumonia. Resume vanc , zosyn. Consult pulm  Up in chair helps to lower rate. He desats at night. This pt likely needs a LTAC for HF oxygen. Insurance company did not call me for a peer to peer yesterday.    5/5   sats 93 % on 8L NC/ fio2 35%. tapered to 5L/28% needs LTAC for HF oxygen.  repeated a cxray 5/4 - Interval development of increased opacification within the right lung base, may reflect developing infiltrate versus atelectasis. started antibiotics- vanc and zosyn. monitor for vancomycin toxity. plan to switch to doxycycline. Consider pulmonary eval .  just completed  course of zosyn 4/29 5/6 tolerated 6L NC/ fio2 35% overnight . [facility] with sinus tachycardia. discussed [facility] pulmonology. monitor off antibiotics zosyn and vancomcyn as he completed adequate course of antibiotics for pseudomas and aspiration. CX ray improvement may lag behind treatment   5/7 stable on 5L/ fio2 28% overnight.      Goals of care, counseling/discussion  Advance Care Planning      Does patient have Capacity? Yes  Contact: Bridgett mitchell, wife  Goals/Wishes: wants to go home,  w Home PT  Code Status- FULL  Pain management- seems comfortable without pain  Prognosis- s/p radiation, progressing  cancer, high risk for aspiration, severe malnutrition  Family discussions-  4/28, 4/29 - discussed care and condition w pt and his wife.  He can talk w trach collar but sats drop.  He wants to go home. Has all oxygen supplies at home. Pt completed zosyn.   5/2 - spoke to his wife. Reviewed his care and condition. She was able to calm him down. She is encouraging him. Will start remeron for appetite and depression. She is working hard to keep him comfortable and well-cared for.  Functional Status - has trach and peg. recommended outpt f/u palliative care    Post acute care plan: pt would benefit from outpt referral to Palliative care, plan for HH and home.    5/4 - not able to wean oxygen.  He needs a LTAC for HF oxygen. Waiting on ins approval      Hypernatremia  4/25 sodium 150. started on D5W . repeat renal panel in PM   5/4- sodium trended down to 145 -> 140, on TFs -> 138 -> 139, not receiving extra water in TFs -> 141    Hypophosphatemia  Replaced  5/4- stable      Aspiration pneumonia  4/22 sats 95% on trach collar 10/ fio2 60% . completed azithromycin.   started on Zosyn for possible aspiration  repeat CX ray-in the inferior hemithorax on the left side consistent with airspace consolidation/volume loss in the left lower lobe is again appreciated, but the lung zones are essentially stable since the prior exam and demonstrate no new areas of airspace consolidation or volume loss.  respiratory culture 4/19 with pseudomonas.   -  completed zosyn  5/4 - new infiltrate RLL. Resume zosyn, vanc, consult pulm     Sinus tachycardia        Dysphagia  Nutrition consulted. Most recent weight and BMI monitored-          Malnutrition (Moderate to Severe)  Weight Loss (Malnutrition): greater than 10% in 6 months                 Measurements:  Wt Readings from Last 1 Encounters:   05/04/22 62.4 kg (137 lb 9.1 oz)   Body mass index is 20.92 kg/m².    Recommendations: Recommendation/Intervention: 1.  Goals: Meet % EEN,  EPN by RD f/u date    Patient has been screened and assessed by RD. RD will follow patient.    4/21 secondary to above. on G tube feedings   4/22 changed to bolus GT feedings.   4/29 - pt noted to sometimes refuse TFs. , on bolus TFs, no water added      Hypokalemia  replaced   5/4 - stable      Chronic respiratory failure with hypoxia, on home O2 therapy  secondary to COPD. on 5L oxygen  at home  5/4  - continue weaning efforts, currently on 8 liters  5/5  past week not able to wean oxygen.   sats 93 % on 8L NC/ fio2 35%. needs LTAC for HF oxygen.  repeated a cxray 5/4 - Interval development of increased opacification within the right lung base, may reflect developing infiltrate versus atelectasis. started antibiotics- vanc and zosyn.  Consulted pulmonary.  just completed  course of zosyn 4/29     Protein-calorie malnutrition  Nutrition consulted. Most recent weight and BMI monitored-          Malnutrition (Moderate to Severe)  Weight Loss (Malnutrition): greater than 10% in 6 months              Measurements:  Wt Readings from Last 1 Encounters:   05/04/22 62.4 kg (137 lb 9.1 oz)   Body mass index is 20.92 kg/m².    Recommendations: Recommendation/Intervention: 1.  Goals: Meet % EEN, EPN by RD f/u date    Patient has been screened and assessed by RD. RD will follow patient.  See above      Normocytic anemia    Patient's with Normocytic anemia.. Hemoglobin stable. Etiology likely due to chronic disease .  Current CBC reviewed-    Recent Labs   Lab 05/02/22  0632 05/03/22  0457 05/04/22  0500   HGB 9.5* 9.5* 9.4*     Monitor CBC and transfuse if H/H drops below 7/21.   - improving      Other hyperlipidemia  Continue home statin         Primary hypertension  Chronic, controlled.  Latest blood pressure and vitals reviewed-   Temp:  [97.3 °F (36.3 °C)-98.5 °F (36.9 °C)]   Pulse:  []   Resp:  [17-25]   BP: ()/(64-81)   SpO2:  [89 %-97 %] .   Home meds for hypertension were reviewed- amlodipine,  losartan and metoprolol  held for now  PRN meds if BP> 180/110 mm HG  5/4 - not requiring scheduled meds      Coronary artery disease involving native coronary artery of native heart without angina pectoris  Continue ASA, plavix, and statin   -stable    Squamous cell cancer of tongue    12/15/21 FNA left neck mass : squamous cell carcinoma, p16 negative  1/4/22 total glossectomy, bilateral neck dissection, bilateral cervical facial advancement flaps and anterolateral thigh free flap reconstruction of his glossectomy defect.  Final path :  [id number] (+microscopic SALINAS, single contralateral node), superior soft tissue margin of left neck with tumor.  2/28/22 start chemoradiation  Finished chemo with cisplatin 4/6. Was going to complete final dose of radiation tomorrow.     -Rad/onc consulted for radiation while inpatient  4/22  s/p radiation oncology eval -on adjuvant chemoradiation, completed 31/33 fractions of RT.  renitiation of RT  inpatient when patient able to tolerate.    4/23  completed radiation sessions  5/4 -attempting to  wean oxygen, takes 5 liters at home and has all supplies.     VTE Risk Mitigation (From admission, onward)         Ordered     enoxaparin injection 40 mg  Daily         04/19/22 1123     IP VTE HIGH RISK PATIENT  Once         04/19/22 1123     Place sequential compression device  Until discontinued         04/19/22 1123                Discharge Planning   NISA: 5/9/2022     Code Status: Full Code   Is the patient medically ready for discharge?: Yes    Reason for patient still in hospital (select all that apply): Treatment  Discharge Plan A: Long-term acute care facility (LTAC)   Discharge Delays: None known at this time              Jordan Armenta MD  Department of Hospital Medicine   [facility] - Telemetry Stepdown

## 2022-05-09 LAB
ALBUMIN SERPL BCP-MCNC: 2.6 G/DL (ref 3.5–5.2)
ALP SERPL-CCNC: 77 U/L (ref 55–135)
ALT SERPL W/O P-5'-P-CCNC: 10 U/L (ref 10–44)
ANION GAP SERPL CALC-SCNC: 8 MMOL/L (ref 8–16)
AST SERPL-CCNC: 13 U/L (ref 10–40)
BASOPHILS # BLD AUTO: 0.02 K/UL (ref 0–0.2)
BASOPHILS NFR BLD: 0.5 % (ref 0–1.9)
BILIRUB SERPL-MCNC: 0.2 MG/DL (ref 0.1–1)
BUN SERPL-MCNC: 21 MG/DL (ref 8–23)
CALCIUM SERPL-MCNC: 9.5 MG/DL (ref 8.7–10.5)
CHLORIDE SERPL-SCNC: 101 MMOL/L (ref 95–110)
CO2 SERPL-SCNC: 30 MMOL/L (ref 23–29)
CREAT SERPL-MCNC: 0.7 MG/DL (ref 0.5–1.4)
DIFFERENTIAL METHOD: ABNORMAL
EOSINOPHIL # BLD AUTO: 0.2 K/UL (ref 0–0.5)
EOSINOPHIL NFR BLD: 3.8 % (ref 0–8)
ERYTHROCYTE [DISTWIDTH] IN BLOOD BY AUTOMATED COUNT: 16.1 % (ref 11.5–14.5)
EST. GFR  (AFRICAN AMERICAN): >60 ML/MIN/1.73 M^2
EST. GFR  (NON AFRICAN AMERICAN): >60 ML/MIN/1.73 M^2
GLUCOSE SERPL-MCNC: 101 MG/DL (ref 70–110)
HCT VFR BLD AUTO: 32.1 % (ref 40–54)
HGB BLD-MCNC: 9.7 G/DL (ref 14–18)
IMM GRANULOCYTES # BLD AUTO: 0.02 K/UL (ref 0–0.04)
IMM GRANULOCYTES NFR BLD AUTO: 0.5 % (ref 0–0.5)
LYMPHOCYTES # BLD AUTO: 0.8 K/UL (ref 1–4.8)
LYMPHOCYTES NFR BLD: 18.5 % (ref 18–48)
MAGNESIUM SERPL-MCNC: 2.1 MG/DL (ref 1.6–2.6)
MCH RBC QN AUTO: 29 PG (ref 27–31)
MCHC RBC AUTO-ENTMCNC: 30.2 G/DL (ref 32–36)
MCV RBC AUTO: 96 FL (ref 82–98)
MONOCYTES # BLD AUTO: 0.6 K/UL (ref 0.3–1)
MONOCYTES NFR BLD: 14.4 % (ref 4–15)
NEUTROPHILS # BLD AUTO: 2.6 K/UL (ref 1.8–7.7)
NEUTROPHILS NFR BLD: 62.3 % (ref 38–73)
NRBC BLD-RTO: 0 /100 WBC
PHOSPHATE SERPL-MCNC: 4.4 MG/DL (ref 2.7–4.5)
PLATELET # BLD AUTO: 231 K/UL (ref 150–450)
PMV BLD AUTO: 9.6 FL (ref 9.2–12.9)
POTASSIUM SERPL-SCNC: 4.6 MMOL/L (ref 3.5–5.1)
PROT SERPL-MCNC: 6.4 G/DL (ref 6–8.4)
RBC # BLD AUTO: 3.34 M/UL (ref 4.6–6.2)
SODIUM SERPL-SCNC: 139 MMOL/L (ref 136–145)
WBC # BLD AUTO: 4.16 K/UL (ref 3.9–12.7)

## 2022-05-09 PROCEDURE — 83735 ASSAY OF MAGNESIUM: CPT | Performed by: HOSPITALIST

## 2022-05-09 PROCEDURE — 20600001 HC STEP DOWN PRIVATE ROOM

## 2022-05-09 PROCEDURE — 94668 MNPJ CHEST WALL SBSQ: CPT

## 2022-05-09 PROCEDURE — 27200966 HC CLOSED SUCTION SYSTEM

## 2022-05-09 PROCEDURE — 25000003 PHARM REV CODE 250: Performed by: HOSPITALIST

## 2022-05-09 PROCEDURE — 99900026 HC AIRWAY MAINTENANCE (STAT)

## 2022-05-09 PROCEDURE — 94640 AIRWAY INHALATION TREATMENT: CPT

## 2022-05-09 PROCEDURE — 63600175 PHARM REV CODE 636 W HCPCS: Performed by: INTERNAL MEDICINE

## 2022-05-09 PROCEDURE — 36415 COLL VENOUS BLD VENIPUNCTURE: CPT | Performed by: HOSPITALIST

## 2022-05-09 PROCEDURE — 25000242 PHARM REV CODE 250 ALT 637 W/ HCPCS: Performed by: STUDENT IN AN ORGANIZED HEALTH CARE EDUCATION/TRAINING PROGRAM

## 2022-05-09 PROCEDURE — 80053 COMPREHEN METABOLIC PANEL: CPT | Performed by: HOSPITALIST

## 2022-05-09 PROCEDURE — 99232 PR SUBSEQUENT HOSPITAL CARE,LEVL II: ICD-10-PCS | Mod: ,,, | Performed by: HOSPITALIST

## 2022-05-09 PROCEDURE — 85025 COMPLETE CBC W/AUTO DIFF WBC: CPT | Performed by: HOSPITALIST

## 2022-05-09 PROCEDURE — 99900035 HC TECH TIME PER 15 MIN (STAT)

## 2022-05-09 PROCEDURE — 25000242 PHARM REV CODE 250 ALT 637 W/ HCPCS: Performed by: INTERNAL MEDICINE

## 2022-05-09 PROCEDURE — 25000003 PHARM REV CODE 250: Performed by: STUDENT IN AN ORGANIZED HEALTH CARE EDUCATION/TRAINING PROGRAM

## 2022-05-09 PROCEDURE — 94761 N-INVAS EAR/PLS OXIMETRY MLT: CPT

## 2022-05-09 PROCEDURE — 27000221 HC OXYGEN, UP TO 24 HOURS

## 2022-05-09 PROCEDURE — 99232 SBSQ HOSP IP/OBS MODERATE 35: CPT | Mod: ,,, | Performed by: HOSPITALIST

## 2022-05-09 PROCEDURE — 84100 ASSAY OF PHOSPHORUS: CPT | Performed by: HOSPITALIST

## 2022-05-09 RX ADMIN — FOLIC ACID 1 MG: 1 TABLET ORAL at 09:05

## 2022-05-09 RX ADMIN — IPRATROPIUM BROMIDE AND ALBUTEROL SULFATE 3 ML: 2.5; .5 SOLUTION RESPIRATORY (INHALATION) at 05:05

## 2022-05-09 RX ADMIN — IPRATROPIUM BROMIDE AND ALBUTEROL SULFATE 3 ML: 2.5; .5 SOLUTION RESPIRATORY (INHALATION) at 07:05

## 2022-05-09 RX ADMIN — OXYCODONE HYDROCHLORIDE 5 MG: 5 SOLUTION ORAL at 09:05

## 2022-05-09 RX ADMIN — IPRATROPIUM BROMIDE AND ALBUTEROL SULFATE 3 ML: 2.5; .5 SOLUTION RESPIRATORY (INHALATION) at 03:05

## 2022-05-09 RX ADMIN — GUAIFENESIN 200 MG: 100 SOLUTION ORAL at 06:05

## 2022-05-09 RX ADMIN — ASPIRIN 81 MG CHEWABLE TABLET 81 MG: 81 TABLET CHEWABLE at 09:05

## 2022-05-09 RX ADMIN — ATORVASTATIN CALCIUM 20 MG: 20 TABLET, FILM COATED ORAL at 09:05

## 2022-05-09 RX ADMIN — THIAMINE HCL TAB 100 MG 100 MG: 100 TAB at 09:05

## 2022-05-09 RX ADMIN — CLOPIDOGREL 75 MG: 75 TABLET, FILM COATED ORAL at 09:05

## 2022-05-09 RX ADMIN — IPRATROPIUM BROMIDE AND ALBUTEROL SULFATE 3 ML: 2.5; .5 SOLUTION RESPIRATORY (INHALATION) at 01:05

## 2022-05-09 RX ADMIN — GLYCOPYRROLATE 2 MG: 1 LIQUID ORAL at 04:05

## 2022-05-09 RX ADMIN — MELATONIN TAB 3 MG 6 MG: 3 TAB at 09:05

## 2022-05-09 RX ADMIN — ENOXAPARIN SODIUM 40 MG: 40 INJECTION SUBCUTANEOUS at 05:05

## 2022-05-09 RX ADMIN — IPRATROPIUM BROMIDE AND ALBUTEROL SULFATE 3 ML: 2.5; .5 SOLUTION RESPIRATORY (INHALATION) at 11:05

## 2022-05-09 NOTE — NURSING
Pt in bed with eyes open, no distress noted, no acute episodes this shift, denies pain, trach dressing changed, tube feedings continues, no distress, bed locked in lowest position, call bell in reach, instructed to call when assistance needed.

## 2022-05-09 NOTE — PT/OT/SLP PROGRESS
Physical Therapy      Patient Name:  Fareed Richard Jr.   MRN:  [id number]    Attempted to see patient for PT; however patient refused, stating he doesn't want to get up before 10, and this therapist arrived at 9:52AM. Arrived back to room ~10:05AM and pt had just transferred to chair with nursing. Physical therapy will follow-up as able.    Beatriz Castro, PT, DPT, GCS  5/9/2022

## 2022-05-09 NOTE — PLAN OF CARE
DANIELE spoke to Cecille cheek/ [facility] [phone] and was advised the fast appeal denial for LTAC was upheld.  She further advised pt's wife was notified and would like to see about [facility] for pt.  SW will follow up.    DANIELE spoke to pt's spouse, Bridgett [phone], to discuss discharge plan.  SW advised the fast appeal for LTAC was denied.  Pt's spouse does not want patient to go to a nursing home for skilled services.  DANIELE will send referral to [facility] to see if they are able to accept pt.      Milady Thomson LMSW  PRN-  [facility]  Ext. [phone]

## 2022-05-09 NOTE — PROGRESS NOTES
Pasha Cherry - Telemetry [facility] Medicine  Progress Note    Patient Name: Fareed Richard Jr.  MRN: [id number]  Patient Class: IP- Inpatient   Admission Date: 4/19/2022  Length of Stay: 20 days  Attending Physician: Jordan Armenta MD  Primary Care Provider: Kodi Tubbs MD        Subjective:     Principal Problem:Acute on chronic respiratory failure        HPI:  Mr. 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 Per wife he has had several days of increasing lethargy, increased work of breathing and secretions. Yesterday she became particularly concerned when he became slightly less responsive and interactive. Wife also notes increased yellow thick secretions from trach site. Upon EMS arrival he was on his home 5 L by trach collar and saturating 93% with significant wheezing.  He received 1 DuoNeb by them.  He was also suctioned.     Currently patient is alert, following commands.  He is responding to yes no questions.  He denies any chest pain but does endorse shortness of breath and cough.  He denies any abdominal pain or pain elsewhere       ED course:   He was given 500ccs IVF as well as one time doses of vanc/cefepime/azithro. Labs significant for BNP of 1600, WBC 5k, K 5.6, Bicarb 35, VBG 7.26/87/25/40. Trop wnl. CXR with new cardiomegaly and pulmonary edema. Suctioned with return of copious secretions. ICU was consulted for acute hypoxemic respiratory failure and he will be admitted for further management.              Overview/Hospital Course:    Admitted to ICU, started on CAP coverage and solumedrol. Quickly weaned off vent after return of copious secretions on suction. Has been maintaining sats well on home O2. Rad/onc consulted for completion of radiology while inpatient. Pending culture sensitivity.    4/21 Transfer to hospital  medicine . 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  initially requiring vent but has now been stable on home trach collar for 24 hours. sats 93% on 8L via trach collar.  Awaiting cultures from sputum, on CAP coverage with ceftriaxone. completed Azithromycin. continue solumedrol 40mg IV daily.  rad/onc consulted this morning-he was supposed to complete radiation outpt but was admitted, attempting to set up for inpatient  4/22 changed to bolus GT feedings. s/p radiation oncology eval -on adjuvant chemoradiation, completed 31/33 fractions of RT.  renitiation of RT  inpatient when patient able to tolerate. sats 95% on trach collar 10/ fio2 60% . completed azithromycin.  started on Zosyn for possible aspiration  repeat CX ray-in the inferior hemithorax on the left side consistent with airspace consolidation/volume loss in the left lower lobe is again appreciated, but the lung zones are essentially stable since the prior exam and demonstrate no new areas of airspace consolidation or volume loss. respiratory culture 4/19 with pansensitive  pseudomonas.     4/23 intermittently refusing bolus GT feedings.stable on 10L/60% Fio2 . completed radiation sessions. off solumedrol  today. PT recs SNF  4/24 K and P replaced   4/25 sodium 150. started on D5W . repeat renal panel in PM . oxygen tapered to 8L/ Fio2 35%   4/26 K of 3.2  replaced   4/27 stable on 8L/ Fio2 35% . mucous plug remvove by suction , wants glycopyrrolate PRN due to dry mouth   Interval History:   4/28 - has trach, G tube, NPO, completed radiation treatments, 92% on 8 liters,+ stool and urinations. Weaning oxygen. Wants to go home w HH. Discussed his care and condition with his wife.    4/29 - still requiring 8 liters per NC. Sats low 90s. I lowered oxygen to 5 liters while in the room and he tolerated it.  Keep weaning.   4/30 - back up  to 8 liters per NC, refused some Bolus TFs yesterday  5/1 - not able to wean oxygen.  He may need a LTAC for HF oxygen. Otherwise, progressing,. Sat up in chair yesterday, + urinations and BM.   5/2 - on 8 liters per NC this am. 92%.  His home goal is 5 liters. Pt tends to need more at night.  Plan is to keep him overnight on 5 liters to make sure he is safe for home oxygen.  He may benefit from LTAC a few days. Accepted to LTAC, waiting on ins auth.  Needs PT/OT for deconditioning.  5/3 -  8 liters, up in chair a am, monitor % meals, on nebs q 4 hours. PT/OT, trach care and suctioning. LTAC referrals sent.   5/4 - nurses unable to keep flow rate below 8 overnight, despite coaching. His volume status is stable. Repeat cxr. - Interval development of increased opacification within the right lung base, may reflect developing infiltrate versus atelectasis.  Small bilateral pleural effusions, increased on the left as compared to prior.  5/5  past week not able to wean oxygen.   sats 93 % on 8L NC/ fio2 35%. tapered to 5L/28% needs LTAC for HF oxygen.  repeated a cxray 5/4 - Interval development of increased opacification within the right lung base, may reflect developing infiltrate versus atelectasis. started antibiotics- vanc and zosyn.  Consider pulmonary eval .  just completed  course of zosyn 4/29 5/6 tolerated 6L NC/ fio2 35% overnight . peer to peer with insurane today. EKG with sinus tachycardia   discussed [facility] pulmonology. monitor off antibiotics zosyn and vancomcyn as he completed adequate course of antibiotics for pseudomas and aspiration. CX ray improvement may lag behind treatment  5/7 stable on 5L/ fio2 28% overnight .completed peer to peer with insurance today . awaiting decision   5/9  stable on 5L/ fio2 28%. denied LTAC,. discussed with wife at the bedside. she wants PT/OT and SLP eval - to work with patient consistently . prefers rehab as second option          Review of Systems:   Pain scale:    Constitutional:  fever,  chills, headache, vision loss, hearing loss, weight loss, Generalized weakness, falls, loss of smell, loss of taste, poor appetite,  sore throat, voice change,immunocompromised state.   Respiratory: cough, shortness of breath.   Cardiovascular: chest pain, dizziness, palpitations, orthopnea, swelling of feet, syncope  Gastrointestinal: nausea, vomiting, abdominal pain, diarrhea, black stool,  blood in stool, change in bowel habits  Genitourinary: hematuria, dysuria, urgency, frequency  Integument/Breast: rash,  pruritis  Hematologic/Lymphatic: easy bruising, lymphadenopathy  Musculoskeletal: arthralgias , myalgias, back pain, neck pain, knee pain  Neurological: confusion, seizures, tremors, slurred speech  Behavioral/Psych:  depression, anxiety, auditory or visual hallucinations        OBJECTIVE:     Physical Exam:  Body mass index is 20.92 kg/m².       Constitutional: Appears  thin built   Head: Normocephalic and atraumatic.   Neck: Normal range of motion. Neck supple. trach collar  Cardiovascular: Normal heart rate.  Regular heart rhythm.  Pulmonary/Chest: Effort normal.   Abdominal: No distension.  No tenderness, PEG tube insitu   Musculoskeletal: Normal range of motion. No edema.   Neurological: Alert and oriented to person, place, and time.   Skin: Skin is warm and dry.   Psychiatric: Normal mood and affect. Behavior is normal.       Vital Signs  Temp: 97.6 °F (36.4 °C) (05/09/22 1541)  Pulse: 99 (05/09/22 1541)  Resp: (Abnormal) 22 (05/09/22 1541)  BP: 120/76 (05/09/22 1541)  SpO2: 95 % (05/09/22 1715)     24 Hour VS Range    Temp:  [97.3 °F (36.3 °C)-97.6 °F (36.4 °C)]   Pulse:  []   Resp:  [15-23]   BP: (120-147)/(76-88)   SpO2:  [94 %-100 %]     Intake/Output Summary (Last 24 hours) at 5/9/2022 1814  Last data filed at 5/9/2022 1200  Gross per 24 hour   Intake 870 ml   Output 500 ml   Net 370 ml         I/O This Shift:  I/O this shift:  In: 750 [NG/GT:750]  Out: 100  [Urine:100]    Wt Readings from Last 3 Encounters:   05/04/22 62.4 kg (137 lb 9.1 oz)   04/06/22 63 kg (138 lb 14.2 oz)   04/04/22 63 kg (139 lb)       I have personally reviewed the vitals and recorded Intake/Output     Laboratory/Diagnostic Data:    CBC/Anemia Labs: Coags:    Recent Labs   Lab 05/07/22  0659 05/08/22  0655 05/09/22  0328   WBC 5.01 4.24 4.16   HGB 10.0* 9.9* 9.7*   HCT 32.8* 31.8* 32.1*    226 231   MCV 94 96 96   RDW 16.3* 16.1* 16.1*    No results for input(s): PT, INR, APTT in the last 168 hours.     Chemistries: ABG:   Recent Labs   Lab 05/07/22  0659 05/08/22  0655 05/09/22  0328    136 139   K 4.4 4.3 4.6   CL 99 98 101   CO2 31* 31* 30*   BUN 14 16 21   CREATININE 0.7 0.6 0.7   CALCIUM 9.2 9.8 9.5   PROT 6.1 6.6 6.4   BILITOT 0.2 0.2 0.2   ALKPHOS 78 79 77   ALT 11 12 10   AST 13 14 13   MG 2.0 2.0 2.1   PHOS 3.7 4.1 4.4    No results for input(s): PH, PCO2, PO2, HCO3, POCSATURATED, BE in the last 168 hours.     POCT Glucose: HbA1c:    No results for input(s): POCTGLUCOSE in the last 168 hours. No results found for: HGBA1C     Cardiac Enzymes: Ejection Fractions:    No results for input(s): CPK, CPKMB, MB, TROPONINI in the last 72 hours. EF   Date Value Ref Range Status   04/19/2022 60 % Final   12/30/2021 65 % Final          No results for input(s): COLORU, APPEARANCEUA, PHUR, SPECGRAV, PROTEINUA, GLUCUA, KETONESU, BILIRUBINUA, OCCULTUA, NITRITE, UROBILINOGEN, LEUKOCYTESUR, RBCUA, WBCUA, BACTERIA, SQUAMEPITHEL, HYALINECASTS in the last 48 hours.    Invalid input(s): WRIGHTSUR    Procalcitonin (ng/mL)   Date Value   04/19/2022 0.09     Lactate (Lactic Acid) (mmol/L)   Date Value   04/19/2022 1.0     BNP (pg/mL)   Date Value   04/22/2022 132 (H)   04/19/2022 1,607 (H)   03/21/2022 242 (H)   11/02/2019 260 (H)   12/27/2018 239 (H)     No results found for: CRP, SEDRATE  No results found for: DDIMER  Ferritin (ng/mL)   Date Value   02/16/2022 135     No results found for:  LDH  Troponin I (ng/mL)   Date Value   04/19/2022 0.018   11/02/2019 0.016   12/27/2018 <0.006     No results found for this or any previous visit.  SARS-CoV2 (COVID-19) Qualitative PCR (no units)   Date Value   05/02/2022 Not Detected   12/31/2021 Not Detected     SARS-CoV-2 RNA, Amplification, Qual (no units)   Date Value   01/13/2022 Negative   01/03/2022 Negative     POC Rapid COVID (no units)   Date Value   04/19/2022 Negative       Microbiology labs for the last week  Microbiology Results (last 7 days)     Procedure Component Value Units Date/Time    Culture, Respiratory with Gram Stain [[id number]]     Order Status: No result Specimen: Respiratory           Reviewed and noted in plan where applicable- Please see chart for full lab data.    Lines/Drains:       Peripheral IV - Single Lumen 04/25/22 2300 22 G Anterior;Distal;Left Upper Arm (Active)   Site Assessment Clean;Dry;Intact 05/05/22 0318   Extremity Assessment Distal to IV No abnormal discoloration 05/04/22 1100   Line Status Saline locked 05/04/22 1100   Dressing Status Clean;Dry;Intact 05/04/22 1100   Dressing Intervention Integrity maintained 05/04/22 1100   Dressing Change Due 04/28/22 04/28/22 0912   Site Change Due 04/26/22 04/28/22 0912   Reason Not Rotated Poor venous access 05/03/22 0735   Number of days: 9            Gastrostomy/Enterostomy 01/04/22 Percutaneous endoscopic gastrostomy (PEG) LUQ (Active)   Securement secured to abdomen 05/04/22 1100   Interventions Prior to Feeding patency checked;residual checked 05/04/22 1100   Feeding Type bolus 05/04/22 1100   Clamp Status/Tolerance clamped;no abdominal discomfort;no abdominal distention;no emesis;no nausea;no residual;no restlessness 05/04/22 1100   Feeding Action feeding continued 05/03/22 0735   Dressing dry and intact 05/04/22 1100   Insertion Site no redness;no warmth;no drainage;no tenderness;no swelling 05/04/22 1100   Site Care secured w/ tape 05/04/22 1100   Flush/Irrigation flushed  w/;water;no resistance met 05/04/22 1100   Dressing Change Due 04/28/22 04/28/22 0912   Intake (mL) 60 mL 04/30/22 1250   Water Bolus (mL) 50 mL 05/03/22 1700   Tube Output(mL)(Include Discarded Residual) 0 mL 04/28/22 1700   Formula Name Isosource 05/03/22 0735   Tube Feeding Intake (mL) 375 05/03/22 1700   Residual Amount (ml) 0 ml 05/04/22 1100   Length Of Feeding (Min) 15 04/28/22 0912   Number of days: 121       Imaging  ECG Results          EKG 12-lead (Final result)  Result time 04/19/22 17:42:02    Final result by Interface, Lab In [facility] (04/19/22 17:42:02)             Narrative:    Test Reason : R06.02,    Vent. Rate : 081 BPM     Atrial Rate : 081 BPM     P-R Int : 158 ms          QRS Dur : 092 ms      QT Int : 366 ms       P-R-T Axes : 061 -70 045 degrees     QTc Int : 425 ms    Normal sinus rhythm  Left axis deviation Now present  Incomplete right bundle branch block  Abnormal ECG  When compared with ECG of 10-MADONNA-2022 10:11,    Confirmed by DIONNE SINGH MD ([id number]) on 4/19/2022 5:41:52 PM    Referred By: [email or web address]   SELF           Confirmed By:DIONNE SINGH MD                            Results for orders placed during the hospital encounter of 04/19/22    Echo    Interpretation Summary  · The left ventricle is normal in size with normal systolic function. The estimated ejection fraction is 60%.  · Normal right ventricular size with normal right ventricular systolic function.  · Normal left ventricular diastolic function.  · The aortic root is mildly dilated.  · Mechanically ventilated; cannot use inferior caval vein diameter to estimate central venous pressure. The IVC is not enlarged and does collapse somewhat with the respiratory cycle, essentially ruling out venous hypertension.      X-Ray Chest AP Portable  Narrative: EXAMINATION:  XR CHEST AP PORTABLE    CLINICAL HISTORY:  hypoxia;    TECHNIQUE:  Single frontal view of the chest was performed.    COMPARISON:  Chest radiograph 04/22/2022,  04/21/2022, 04/19/2022.    FINDINGS:  Monitoring leads overlie the thorax.  There is a tracheostomy cannula in stable positioning.  The trachea is midline.  Cardiomediastinal silhouette is stable.  There is aortic plaque.  There are small bilateral pleural effusions, probably increased on the left.  Additionally, there is increased opacification over the right lung base, which may reflect atelectasis or developing infiltrate.  No pneumothorax.  The osseous structures appear intact.    There are postoperative changes in the right axillary region there is no evidence of free air beneath the hemidiaphragms.  Impression: Interval development of increased opacification within the right lung base, may reflect developing infiltrate versus atelectasis.    Small bilateral pleural effusions, increased on the left as compared to prior.    Electronically signed by resident: Tim Aparicio  Date:    05/04/2022  Time:    08:52    Electronically signed by: Daniel Álvarez MD  Date:    05/04/2022  Time:    09:12      Labs, Imaging, EKG and Diagnostic results from 5/9/2022 were reviewed.    Medications:  Medication list was reviewed and changes noted under Assessment/Plan.  No current facility-administered medications on file prior to encounter.     Current Outpatient Medications on File Prior to Encounter   Medication Sig Dispense Refill    amLODIPine (NORVASC) 10 MG tablet Take 10 mg by mouth once daily.      atorvastatin (LIPITOR) 20 MG tablet 1 tablet (20 mg total) by Per G Tube route once daily. 90 tablet 3    glycopyrrolate (CUVPOSA) 1 mg/5 mL (0.2 mg/mL) Soln Take 5 mLs (1 mg total) by mouth 3 (three) times daily as needed (TO REDUCE SECRETIONS). 473 mL 1    acetaminophen (TYLENOL) 325 MG tablet 2 tablets (650 mg total) by Per G Tube route every 6 (six) hours.  0    albuterol-ipratropium (DUO-NEB) 2.5 mg-0.5 mg/3 mL nebulizer solution Take 3 mLs by nebulization every 4 (four) hours as needed for Wheezing. Rescue 75 mL 0     aspirin 81 MG Chew 1 tablet (81 mg total) by Per G Tube route once daily.  0    bisacodyL (DULCOLAX) 10 mg Supp Place 1 suppository (10 mg total) rectally daily as needed.  0    clopidogreL (PLAVIX) 75 mg tablet 1 tablet (75 mg total) by Per G Tube route once daily. 30 tablet 11    duke's soln (benadryl 30 mL, mylanta 30 mL, LIDOcaine 30 mL, nystatin 30 mL) 120mL Take 10 mLs by mouth 4 (four) times daily as needed (mucositis). 360 mL 0    enoxaparin (LOVENOX) 40 mg/0.4 mL Syrg Inject 0.4 mLs (40 mg total) into the skin once daily.      folic acid (FOLVITE) 1 MG tablet 1 tablet (1 mg total) by Per G Tube route once daily.  0    guaiFENesin 200 mg/5 mL Liqd Take 400 mg by mouth every 4 (four) hours as needed (for secretions). 473 mL 3    losartan (COZAAR) 50 MG tablet 1 tablet (50 mg total) by Per G Tube route once daily. 90 tablet 3    melatonin (MELATIN) 3 mg tablet 2 tablets (6 mg total) by Per G Tube route nightly.  0    metoprolol tartrate (LOPRESSOR) 100 MG tablet 1 tablet (100 mg total) by Per G Tube route 2 (two) times daily. 60 tablet 11    ondansetron (ZOFRAN-ODT) 8 MG TbDL Take 1 tablet (8 mg total) by mouth every 8 (eight) hours as needed (nausea). 30 tablet 1    oxyCODONE (ROXICODONE) 5 mg/5 mL Soln 5 mLs (5 mg total) by Per G Tube route every 4 (four) hours as needed (Pain). 150 mL 0    polyethylene glycol (GLYCOLAX) 17 gram PwPk 17 g by Per G Tube route 2 (two) times daily.  0    senna-docusate 8.6-50 mg (PERICOLACE) 8.6-50 mg per tablet 1 tablet by Per G Tube route 2 (two) times daily.      sodium chloride (OCEAN) 0.65 % nasal spray 1 spray by Nasal route as needed for Congestion.  12    sodium chloride 3% 3 % nebulizer solution Take 4 mLs by nebulization every 8 (eight) hours.      thiamine 100 MG tablet 1 tablet (100 mg total) by Per G Tube route once daily.       Scheduled Medications:  albuterol-ipratropium, 3 mL, Nebulization, Q4H  aspirin, 81 mg, Per G Tube, Daily  atorvastatin,  20 mg, Per G Tube, Daily  clopidogreL, 75 mg, Per G Tube, Daily  enoxaparin, 40 mg, Subcutaneous, Daily  folic acid, 1 mg, Per G Tube, Daily  guaiFENesin 100 mg/5 ml, 200 mg, Per G Tube, Q8H  melatonin, 6 mg, Per G Tube, Nightly  polyethylene glycol, 17 g, Per G Tube, BID  scopolamine, 1 patch, Transdermal, Q3 Days  thiamine, 100 mg, Per G Tube, Daily      PRN: sodium chloride, albuterol-ipratropium, bisacodyL, glycopyrrolate, ondansetron, oxyCODONE, sodium chloride, sodium chloride 0.9%  Infusions:   Estimated Creatinine Clearance: 83 mL/min (based on SCr of 0.7 mg/dL).    Assessment/Plan:      * Acute on chronic respiratory failure  Pt with SCC of the tongue s/p total glossectomy, chemowith cisplatin, and nearing completion of radiation with trach, COPD, asthma, HTN, CAD s/p PCI presenting for acute on chronic hypoxemic respiratory failure. He is on 5L with trach collar at home. He has had increased yellow secretions, work of breathing and SOB for the past several days. BNP elevated to 1600 on admission with pulmonary edema and new cardiomegaly on CXR. Trop wnl. Bicarb is elevated suggesting chronic hypercapnia. Suctioning has cleared thick mucous. He was able to be weaned down to his home O2, however after ~ 20 minutes he desats again, requiring deep suction with resolution of hypoxia. Now stable on home O2. Diuresed -3L     -Pt stable on home O2 for 24 hours (5 liters per NC), ready to stepdown to floor  - CAP coverage with ceftriaxone/azithro  - Methylprednisolone 40 mg x5 days for possible element of COPD exacerbation  - Duonebs q4  - Hypertonic saline nebs  - Chest PT  - Suction PRN    4/21 Transfer to hospital medicine . 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  initially requiring vent but has now been stable on home trach collar for 24 hours. sats 93% on 8L via trach collar.   Awaiting cultures from sputum, on CAP coverage with ceftriaxone. completed Azithromycin. continue solumedrol 40mg IV daily.   4/22 sats 95% on trach collar 10/ fio2 60% . completed azithromycin.   started on Zosyn for possible aspiration  repeat CX ray-in the inferior hemithorax on the left side consistent with airspace consolidation/volume loss in the left lower lobe is again appreciated, but the lung zones are essentially stable since the prior exam and demonstrate no new areas of airspace consolidation or volume loss.  respiratory culture 4/19 with pseudomonas.     4/30-  oxygen tapered to 8L/ Fio2 35% .  Needs to be at 5 liters to go home. Add guaifenisen for cough and secertion. Up in chair as much as possible  5/1 - not able to wean oxygen.  He may need a LTAC for HF oxygen.   5/3 - on 8 liters NC ( home goal is 5 liters)  5/4 - Now chronic, repeat cxr shows RLL pneumonia. Resume vanc , zosyn. Consult pulm  Up in chair helps to lower rate. He desats at night. This pt likely needs a LTAC for HF oxygen. Insurance company did not call me for a peer to peer yesterday.    5/5   sats 93 % on 8L NC/ fio2 35%. tapered to 5L/28% needs LTAC for HF oxygen.  repeated a cxray 5/4 - Interval development of increased opacification within the right lung base, may reflect developing infiltrate versus atelectasis. started antibiotics- vanc and zosyn. monitor for vancomycin toxity. plan to switch to doxycycline. Consider pulmonary eval .  just completed  course of zosyn 4/29 5/6 tolerated 6L NC/ fio2 35% overnight . peer to peer with insurane today. EKG with sinus tachycardia. discussed [facility] pulmonology. monitor off antibiotics zosyn and vancomcyn as he completed adequate course of antibiotics for pseudomas and aspiration. CX ray improvement may lag behind treatment   5/7 stable on 5L/ fio2 28% overnight.      Goals of care, counseling/discussion  Advance Care Planning      Does patient have Capacity? Yes  Contact: Bridgett mitchell  wife  Goals/Wishes: wants to go home, HH w Home PT  Code Status- FULL  Pain management- seems comfortable without pain  Prognosis- s/p radiation, progressing cancer, high risk for aspiration, severe malnutrition  Family discussions-  4/28, 4/29 - discussed care and condition w pt and his wife.  He can talk w trach collar but sats drop.  He wants to go home. Has all oxygen supplies at home. Pt completed zosyn.   5/2 - spoke to his wife. Reviewed his care and condition. She was able to calm him down. She is encouraging him. Will start remeron for appetite and depression. She is working hard to keep him comfortable and well-cared for.  Functional Status - has trach and peg. recommended outpt f/u palliative care    Post acute care plan: pt would benefit from outpt referral to Palliative care, plan for HH and home.    5/4 - not able to wean oxygen.  He needs a LTAC for HF oxygen. Waiting on ins approval      Hypernatremia  4/25 sodium 150. started on D5W . repeat renal panel in PM   5/4- sodium trended down to 145 -> 140, on TFs -> 138 -> 139, not receiving extra water in TFs -> 141    Hypophosphatemia  Replaced  5/4- stable      Aspiration pneumonia  4/22 sats 95% on trach collar 10/ fio2 60% . completed azithromycin.   started on Zosyn for possible aspiration  repeat CX ray-in the inferior hemithorax on the left side consistent with airspace consolidation/volume loss in the left lower lobe is again appreciated, but the lung zones are essentially stable since the prior exam and demonstrate no new areas of airspace consolidation or volume loss.  respiratory culture 4/19 with pseudomonas.   -  completed zosyn  5/4 - new infiltrate RLL. Resume zosyn, vanc, consult pulm     Sinus tachycardia        Dysphagia  Nutrition consulted. Most recent weight and BMI monitored-          Malnutrition (Moderate to Severe)  Weight Loss (Malnutrition): greater than 10% in 6 months                 Measurements:  Wt Readings from Last 1  Encounters:   05/04/22 62.4 kg (137 lb 9.1 oz)   Body mass index is 20.92 kg/m².    Recommendations: Recommendation/Intervention: 1.  Goals: Meet % EEN, EPN by RD f/u date    Patient has been screened and assessed by RD. RD will follow patient.    4/21 secondary to above. on G tube feedings   4/22 changed to bolus GT feedings.   4/29 - pt noted to sometimes refuse TFs. , on bolus TFs, no water added      Hypokalemia  replaced   5/4 - stable      Chronic respiratory failure with hypoxia, on home O2 therapy  secondary to COPD. on 5L oxygen  at home  5/4  - continue weaning efforts, currently on 8 liters  5/5  past week not able to wean oxygen.   sats 93 % on 8L NC/ fio2 35%. needs LTAC for HF oxygen.  repeated a cxray 5/4 - Interval development of increased opacification within the right lung base, may reflect developing infiltrate versus atelectasis. started antibiotics- vanc and zosyn.  Consulted pulmonary.  just completed  course of zosyn 4/29     Protein-calorie malnutrition  Nutrition consulted. Most recent weight and BMI monitored-          Malnutrition (Moderate to Severe)  Weight Loss (Malnutrition): greater than 10% in 6 months              Measurements:  Wt Readings from Last 1 Encounters:   05/04/22 62.4 kg (137 lb 9.1 oz)   Body mass index is 20.92 kg/m².    Recommendations: Recommendation/Intervention: 1.  Goals: Meet % EEN, EPN by RD f/u date    Patient has been screened and assessed by RD. RD will follow patient.  See above      Normocytic anemia    Patient's with Normocytic anemia.. Hemoglobin stable. Etiology likely due to chronic disease .  Current CBC reviewed-    Recent Labs   Lab 05/02/22  0632 05/03/22  0457 05/04/22  0500   HGB 9.5* 9.5* 9.4*     Monitor CBC and transfuse if H/H drops below 7/21.   - improving      Other hyperlipidemia  Continue home statin         Primary hypertension  Chronic, controlled.  Latest blood pressure and vitals reviewed-   Temp:  [97.3 °F (36.3 °C)-98.5  °F (36.9 °C)]   Pulse:  []   Resp:  [17-25]   BP: ()/(64-81)   SpO2:  [89 %-97 %] .   Home meds for hypertension were reviewed- amlodipine, losartan and metoprolol  held for now  PRN meds if BP> 180/110 mm HG  5/4 - not requiring scheduled meds      Coronary artery disease involving native coronary artery of native heart without angina pectoris  Continue ASA, plavix, and statin   -stable    Squamous cell cancer of tongue    12/15/21 FNA left neck mass : squamous cell carcinoma, p16 negative  1/4/22 total glossectomy, bilateral neck dissection, bilateral cervical facial advancement flaps and anterolateral thigh free flap reconstruction of his glossectomy defect.  Final path :  [id number] (+microscopic SALINAS, single contralateral node), superior soft tissue margin of left neck with tumor.  2/28/22 start chemoradiation  Finished chemo with cisplatin 4/6. Was going to complete final dose of radiation tomorrow.     -Rad/onc consulted for radiation while inpatient  4/22  s/p radiation oncology eval -on adjuvant chemoradiation, completed 31/33 fractions of RT.  renitiation of RT  inpatient when patient able to tolerate.    4/23  completed radiation sessions  5/4 -attempting to  wean oxygen, takes 5 liters at home and has all supplies.     VTE Risk Mitigation (From admission, onward)         Ordered     enoxaparin injection 40 mg  Daily         04/19/22 1123     IP VTE HIGH RISK PATIENT  Once         04/19/22 1123     Place sequential compression device  Until discontinued         04/19/22 1123                Discharge Planning   NISA: 5/10/2022     Code Status: Full Code   Is the patient medically ready for discharge?: Yes    Reason for patient still in hospital (select all that apply): Treatment  Discharge Plan A: Long-term acute care facility (LTAC)   Discharge Delays: None known at this time              Jordan Armenta MD  Department of Hospital Medicine   [facility] - Telemetry Stepdown

## 2022-05-09 NOTE — PLAN OF CARE
Problem: Adult Inpatient Plan of Care  Goal: Plan of Care Review  Outcome: Ongoing, Progressing  Goal: Patient-Specific Goal (Individualized)  Outcome: Ongoing, Progressing  Goal: Absence of Hospital-Acquired Illness or Injury  Outcome: Ongoing, Progressing  Goal: Optimal Comfort and Wellbeing  Outcome: Ongoing, Progressing  Goal: Readiness for Transition of Care  Outcome: Ongoing, Progressing     Problem: Impaired Wound Healing  Goal: Optimal Wound Healing  Outcome: Ongoing, Progressing     Problem: Skin Injury Risk Increased  Goal: Skin Health and Integrity  Outcome: Ongoing, Progressing     Problem: Communication Impairment (Artificial Airway)  Goal: Effective Communication  Outcome: Ongoing, Progressing     Pt in bed with eyes open, AOX4, able to make needs known by gestures and writing them down, remains on 5L and 28% Fi02, no c/o pain, no distress noted, bed locked in lowest position, call bell in reach, instructed to call when assistance needed.

## 2022-05-09 NOTE — RESPIRATORY THERAPY
RAPID RESPONSE RESPIRATORY THERAPY PROACTIVE NOTE           Time of visit: 804     Code Status: Full Code   : 1950  Bed: 8094/8094 A:   MRN: [id number]  Time spent at the bedside: < 15 min    SITUATION    Evaluated patient for: LDA Check     BACKGROUND    Patient has a past medical history of Cancer, COPD (chronic obstructive pulmonary disease), Hyperlipidemia, Hypertension, and Pseudoaneurysm.  Clinically Significant Surgical Hx: tracheostomy    24 Hours Vitals Range:  Temp:  [97.5 °F (36.4 °C)-98.6 °F (37 °C)]   Pulse:  []   Resp:  [15-24]   BP: (136-152)/(82-88)   SpO2:  [95 %-100 %]     Labs:    Recent Labs     22  0659 22  0655 22  0328    136 139   K 4.4 4.3 4.6   CL 99 98 101   CO2 31* 31* 30*   CREATININE 0.7 0.6 0.7   GLU 99 96 101   PHOS 3.7 4.1 4.4   MG 2.0 2.0 2.1        No results for input(s): PH, PCO2, PO2, HCO3, POCSATURATED, BE in the last 72 hours.    ASSESSMENT/INTERVENTIONS    Upon arrival in room no respiratory needs at this time. All supplies are at bedside.   4.0 and two 6.0 cuffed trachs at bedside.     Last VS   Temp: 97.5 °F (36.4 °C) (715)  Pulse: 94 (754)  Resp: 18 (754)  BP: 141/88 (715)  SpO2: 95 % (754)    Level of Consciousness: Level of Consciousness (AVPU): alert  Respiratory Effort: Respiratory Effort: Normal, Unlabored Expansion/Accessory Muscle Usage: Expansion/Accessory Muscles/Retractions: no use of accessory muscles, no retractions  All Lung Field Breath Sounds: All Lung Fields Breath Sounds: Anterior:, Posterior:  DEE Breath Sounds: clear  LLL Breath Sounds: clear  RUL Breath Sounds: clear  RML Breath Sounds: clear  RLL Breath Sounds: clear  O2 Device/Concentration: Flow (L/min): 5, Oxygen Concentration (%): 28, O2 Device (Oxygen Therapy): tracheostomy collar  Surgical airway: Yes, Type: Shiley Size: 6, uncuffed  Ambu at bedside: Ambu bag with the patient?: Yes, Adult Ambu     Active Orders   Respiratory  Care    Chest physiotherapy TID     Frequency: TID     Number of Occurrences: Until Specified     Order Questions:      Indications: COPIOUS SPUTUM PRODUCTION      Indications: ATELECTASIS PROPHYLAXIS      Indications: ATELECTASIS NONRESPONSIVE TO TX    Inhalation Treatment Q4H     Frequency: Q4H     Number of Occurrences: Until Specified    Oxygen Continuous     Frequency: Continuous     Number of Occurrences: Until Specified     Order Questions:      Device type: Low flow      Device: Trach Collar (Comment: 8L)      FiO2%: 35%      Titrate O2 per Oxygen Titration Protocol: Yes      To maintain SpO2 goal of: >= 90%      Notify MD of: Inability to achieve desired SpO2; Sudden change in patient status and requires 20% increase in FiO2; Patient requires >60% FiO2    Pulse Oximetry Continuous     Frequency: Continuous     Number of Occurrences: Until Specified    Routine tracheostomy care     Frequency: BID     Number of Occurrences: Until Specified       RECOMMENDATIONS    We recommend: RRT Recs: Continue POC per primary team.    ESCALATION    .    FOLLOW-UP    Please call back the Rapid Response RT, Gwen Anand, RRT at x [phone] for any questions or concerns.

## 2022-05-10 LAB
ALBUMIN SERPL BCP-MCNC: 2.6 G/DL (ref 3.5–5.2)
ALBUMIN SERPL BCP-MCNC: 2.7 G/DL (ref 3.5–5.2)
ALP SERPL-CCNC: 76 U/L (ref 55–135)
ALT SERPL W/O P-5'-P-CCNC: 9 U/L (ref 10–44)
ANION GAP SERPL CALC-SCNC: 7 MMOL/L (ref 8–16)
ANION GAP SERPL CALC-SCNC: 8 MMOL/L (ref 8–16)
AST SERPL-CCNC: 13 U/L (ref 10–40)
BASOPHILS # BLD AUTO: 0.03 K/UL (ref 0–0.2)
BASOPHILS NFR BLD: 0.7 % (ref 0–1.9)
BILIRUB SERPL-MCNC: 0.2 MG/DL (ref 0.1–1)
BUN SERPL-MCNC: 22 MG/DL (ref 8–23)
BUN SERPL-MCNC: 22 MG/DL (ref 8–23)
CALCIUM SERPL-MCNC: 9.3 MG/DL (ref 8.7–10.5)
CALCIUM SERPL-MCNC: 9.7 MG/DL (ref 8.7–10.5)
CHLORIDE SERPL-SCNC: 100 MMOL/L (ref 95–110)
CHLORIDE SERPL-SCNC: 102 MMOL/L (ref 95–110)
CO2 SERPL-SCNC: 30 MMOL/L (ref 23–29)
CO2 SERPL-SCNC: 33 MMOL/L (ref 23–29)
CREAT SERPL-MCNC: 0.7 MG/DL (ref 0.5–1.4)
CREAT SERPL-MCNC: 0.8 MG/DL (ref 0.5–1.4)
DIFFERENTIAL METHOD: ABNORMAL
EOSINOPHIL # BLD AUTO: 0.2 K/UL (ref 0–0.5)
EOSINOPHIL NFR BLD: 4.8 % (ref 0–8)
ERYTHROCYTE [DISTWIDTH] IN BLOOD BY AUTOMATED COUNT: 15.9 % (ref 11.5–14.5)
EST. GFR  (AFRICAN AMERICAN): >60 ML/MIN/1.73 M^2
EST. GFR  (AFRICAN AMERICAN): >60 ML/MIN/1.73 M^2
EST. GFR  (NON AFRICAN AMERICAN): >60 ML/MIN/1.73 M^2
EST. GFR  (NON AFRICAN AMERICAN): >60 ML/MIN/1.73 M^2
GLUCOSE SERPL-MCNC: 130 MG/DL (ref 70–110)
GLUCOSE SERPL-MCNC: 90 MG/DL (ref 70–110)
HCT VFR BLD AUTO: 30.6 % (ref 40–54)
HGB BLD-MCNC: 9.3 G/DL (ref 14–18)
IMM GRANULOCYTES # BLD AUTO: 0.03 K/UL (ref 0–0.04)
IMM GRANULOCYTES NFR BLD AUTO: 0.7 % (ref 0–0.5)
LYMPHOCYTES # BLD AUTO: 0.8 K/UL (ref 1–4.8)
LYMPHOCYTES NFR BLD: 18.4 % (ref 18–48)
MAGNESIUM SERPL-MCNC: 2.2 MG/DL (ref 1.6–2.6)
MCH RBC QN AUTO: 29.2 PG (ref 27–31)
MCHC RBC AUTO-ENTMCNC: 30.4 G/DL (ref 32–36)
MCV RBC AUTO: 96 FL (ref 82–98)
MONOCYTES # BLD AUTO: 0.6 K/UL (ref 0.3–1)
MONOCYTES NFR BLD: 14.7 % (ref 4–15)
NEUTROPHILS # BLD AUTO: 2.6 K/UL (ref 1.8–7.7)
NEUTROPHILS NFR BLD: 60.7 % (ref 38–73)
NRBC BLD-RTO: 0 /100 WBC
PHOSPHATE SERPL-MCNC: 4.4 MG/DL (ref 2.7–4.5)
PHOSPHATE SERPL-MCNC: 4.8 MG/DL (ref 2.7–4.5)
PLATELET # BLD AUTO: 224 K/UL (ref 150–450)
PMV BLD AUTO: 9.6 FL (ref 9.2–12.9)
POTASSIUM SERPL-SCNC: 4.6 MMOL/L (ref 3.5–5.1)
POTASSIUM SERPL-SCNC: 5.2 MMOL/L (ref 3.5–5.1)
PROT SERPL-MCNC: 5.8 G/DL (ref 6–8.4)
RBC # BLD AUTO: 3.19 M/UL (ref 4.6–6.2)
SODIUM SERPL-SCNC: 140 MMOL/L (ref 136–145)
SODIUM SERPL-SCNC: 140 MMOL/L (ref 136–145)
WBC # BLD AUTO: 4.35 K/UL (ref 3.9–12.7)

## 2022-05-10 PROCEDURE — 25000003 PHARM REV CODE 250: Performed by: HOSPITALIST

## 2022-05-10 PROCEDURE — 25000242 PHARM REV CODE 250 ALT 637 W/ HCPCS: Performed by: INTERNAL MEDICINE

## 2022-05-10 PROCEDURE — 94640 AIRWAY INHALATION TREATMENT: CPT

## 2022-05-10 PROCEDURE — 99900035 HC TECH TIME PER 15 MIN (STAT)

## 2022-05-10 PROCEDURE — 80053 COMPREHEN METABOLIC PANEL: CPT | Performed by: HOSPITALIST

## 2022-05-10 PROCEDURE — 85025 COMPLETE CBC W/AUTO DIFF WBC: CPT | Performed by: HOSPITALIST

## 2022-05-10 PROCEDURE — 80069 RENAL FUNCTION PANEL: CPT | Performed by: HOSPITALIST

## 2022-05-10 PROCEDURE — 94761 N-INVAS EAR/PLS OXIMETRY MLT: CPT

## 2022-05-10 PROCEDURE — 92610 EVALUATE SWALLOWING FUNCTION: CPT

## 2022-05-10 PROCEDURE — 84100 ASSAY OF PHOSPHORUS: CPT | Performed by: HOSPITALIST

## 2022-05-10 PROCEDURE — 27000221 HC OXYGEN, UP TO 24 HOURS

## 2022-05-10 PROCEDURE — 83735 ASSAY OF MAGNESIUM: CPT | Performed by: HOSPITALIST

## 2022-05-10 PROCEDURE — 97116 GAIT TRAINING THERAPY: CPT

## 2022-05-10 PROCEDURE — 97165 OT EVAL LOW COMPLEX 30 MIN: CPT

## 2022-05-10 PROCEDURE — 20600001 HC STEP DOWN PRIVATE ROOM

## 2022-05-10 PROCEDURE — 25000242 PHARM REV CODE 250 ALT 637 W/ HCPCS: Performed by: STUDENT IN AN ORGANIZED HEALTH CARE EDUCATION/TRAINING PROGRAM

## 2022-05-10 PROCEDURE — 25000003 PHARM REV CODE 250: Performed by: STUDENT IN AN ORGANIZED HEALTH CARE EDUCATION/TRAINING PROGRAM

## 2022-05-10 PROCEDURE — 63600175 PHARM REV CODE 636 W HCPCS: Performed by: INTERNAL MEDICINE

## 2022-05-10 PROCEDURE — 97530 THERAPEUTIC ACTIVITIES: CPT

## 2022-05-10 PROCEDURE — 36415 COLL VENOUS BLD VENIPUNCTURE: CPT | Performed by: HOSPITALIST

## 2022-05-10 PROCEDURE — 92597 ORAL SPEECH DEVICE EVAL: CPT

## 2022-05-10 PROCEDURE — 99232 SBSQ HOSP IP/OBS MODERATE 35: CPT | Mod: ,,, | Performed by: HOSPITALIST

## 2022-05-10 PROCEDURE — 99900026 HC AIRWAY MAINTENANCE (STAT)

## 2022-05-10 PROCEDURE — 99232 PR SUBSEQUENT HOSPITAL CARE,LEVL II: ICD-10-PCS | Mod: ,,, | Performed by: HOSPITALIST

## 2022-05-10 PROCEDURE — 97535 SELF CARE MNGMENT TRAINING: CPT

## 2022-05-10 RX ADMIN — IPRATROPIUM BROMIDE AND ALBUTEROL SULFATE 3 ML: 2.5; .5 SOLUTION RESPIRATORY (INHALATION) at 07:05

## 2022-05-10 RX ADMIN — IPRATROPIUM BROMIDE AND ALBUTEROL SULFATE 3 ML: 2.5; .5 SOLUTION RESPIRATORY (INHALATION) at 03:05

## 2022-05-10 RX ADMIN — ATORVASTATIN CALCIUM 20 MG: 20 TABLET, FILM COATED ORAL at 09:05

## 2022-05-10 RX ADMIN — THIAMINE HCL TAB 100 MG 100 MG: 100 TAB at 09:05

## 2022-05-10 RX ADMIN — IPRATROPIUM BROMIDE AND ALBUTEROL SULFATE 3 ML: 2.5; .5 SOLUTION RESPIRATORY (INHALATION) at 12:05

## 2022-05-10 RX ADMIN — GLYCOPYRROLATE 2 MG: 1 LIQUID ORAL at 05:05

## 2022-05-10 RX ADMIN — OXYCODONE HYDROCHLORIDE 5 MG: 5 SOLUTION ORAL at 09:05

## 2022-05-10 RX ADMIN — ENOXAPARIN SODIUM 40 MG: 40 INJECTION SUBCUTANEOUS at 05:05

## 2022-05-10 RX ADMIN — CLOPIDOGREL 75 MG: 75 TABLET, FILM COATED ORAL at 09:05

## 2022-05-10 RX ADMIN — SCOPALAMINE 1 PATCH: 1 PATCH, EXTENDED RELEASE TRANSDERMAL at 05:05

## 2022-05-10 RX ADMIN — ASPIRIN 81 MG CHEWABLE TABLET 81 MG: 81 TABLET CHEWABLE at 09:05

## 2022-05-10 RX ADMIN — IPRATROPIUM BROMIDE AND ALBUTEROL SULFATE 3 ML: 2.5; .5 SOLUTION RESPIRATORY (INHALATION) at 11:05

## 2022-05-10 RX ADMIN — MELATONIN TAB 3 MG 6 MG: 3 TAB at 09:05

## 2022-05-10 RX ADMIN — FOLIC ACID 1 MG: 1 TABLET ORAL at 09:05

## 2022-05-10 RX ADMIN — IPRATROPIUM BROMIDE AND ALBUTEROL SULFATE 3 ML: 2.5; .5 SOLUTION RESPIRATORY (INHALATION) at 08:05

## 2022-05-10 RX ADMIN — IPRATROPIUM BROMIDE AND ALBUTEROL SULFATE 3 ML: 2.5; .5 SOLUTION RESPIRATORY (INHALATION) at 04:05

## 2022-05-10 NOTE — PROGRESS NOTES
Pasha Cherry - Telemetry [facility] Medicine  Progress Note    Patient Name: Fareed iRchard Jr.  MRN: [id number]  Patient Class: IP- Inpatient   Admission Date: 4/19/2022  Length of Stay: 21 days  Attending Physician: Jordan Armenta MD  Primary Care Provider: Kodi Tubbs MD        Subjective:     Principal Problem:Acute on chronic respiratory failure        HPI:  Mr. 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 Per wife he has had several days of increasing lethargy, increased work of breathing and secretions. Yesterday she became particularly concerned when he became slightly less responsive and interactive. Wife also notes increased yellow thick secretions from trach site. Upon EMS arrival he was on his home 5 L by trach collar and saturating 93% with significant wheezing.  He received 1 DuoNeb by them.  He was also suctioned.     Currently patient is alert, following commands.  He is responding to yes no questions.  He denies any chest pain but does endorse shortness of breath and cough.  He denies any abdominal pain or pain elsewhere       ED course:   He was given 500ccs IVF as well as one time doses of vanc/cefepime/azithro. Labs significant for BNP of 1600, WBC 5k, K 5.6, Bicarb 35, VBG 7.26/87/25/40. Trop wnl. CXR with new cardiomegaly and pulmonary edema. Suctioned with return of copious secretions. ICU was consulted for acute hypoxemic respiratory failure and he will be admitted for further management.              Overview/Hospital Course:    Admitted to ICU, started on CAP coverage and solumedrol. Quickly weaned off vent after return of copious secretions on suction. Has been maintaining sats well on home O2. Rad/onc consulted for completion of radiology while inpatient. Pending culture sensitivity.    4/21 Transfer to hospital  medicine . 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  initially requiring vent but has now been stable on home trach collar for 24 hours. sats 93% on 8L via trach collar.  Awaiting cultures from sputum, on CAP coverage with ceftriaxone. completed Azithromycin. continue solumedrol 40mg IV daily.  rad/onc consulted this morning-he was supposed to complete radiation outpt but was admitted, attempting to set up for inpatient  4/22 changed to bolus GT feedings. s/p radiation oncology eval -on adjuvant chemoradiation, completed 31/33 fractions of RT.  renitiation of RT  inpatient when patient able to tolerate. sats 95% on trach collar 10/ fio2 60% . completed azithromycin.  started on Zosyn for possible aspiration  repeat CX ray-in the inferior hemithorax on the left side consistent with airspace consolidation/volume loss in the left lower lobe is again appreciated, but the lung zones are essentially stable since the prior exam and demonstrate no new areas of airspace consolidation or volume loss. respiratory culture 4/19 with pansensitive  pseudomonas.     4/23 intermittently refusing bolus GT feedings.stable on 10L/60% Fio2 . completed radiation sessions. off solumedrol  today. PT recs SNF  4/24 K and P replaced   4/25 sodium 150. started on D5W . repeat renal panel in PM . oxygen tapered to 8L/ Fio2 35%   4/26 K of 3.2  replaced   4/27 stable on 8L/ Fio2 35% . mucous plug remvove by suction , wants glycopyrrolate PRN due to dry mouth   Interval History:   4/28 - has trach, G tube, NPO, completed radiation treatments, 92% on 8 liters,+ stool and urinations. Weaning oxygen. Wants to go home w HH. Discussed his care and condition with his wife.    4/29 - still requiring 8 liters per NC. Sats low 90s. I lowered oxygen to 5 liters while in the room and he tolerated it.  Keep weaning.   4/30 - back up  to 8 liters per NC, refused some Bolus TFs yesterday  5/1 - not able to wean oxygen.  He may need a LTAC for HF oxygen. Otherwise, progressing,. Sat up in chair yesterday, + urinations and BM.   5/2 - on 8 liters per NC this am. 92%.  His home goal is 5 liters. Pt tends to need more at night.  Plan is to keep him overnight on 5 liters to make sure he is safe for home oxygen.  He may benefit from LTAC a few days. Accepted to LTAC, waiting on ins auth.  Needs PT/OT for deconditioning.  5/3 -  8 liters, up in chair a am, monitor % meals, on nebs q 4 hours. PT/OT, trach care and suctioning. LTAC referrals sent.   5/4 - nurses unable to keep flow rate below 8 overnight, despite coaching. His volume status is stable. Repeat cxr. - Interval development of increased opacification within the right lung base, may reflect developing infiltrate versus atelectasis.  Small bilateral pleural effusions, increased on the left as compared to prior.  5/5  past week not able to wean oxygen.   sats 93 % on 8L NC/ fio2 35%. tapered to 5L/28% needs LTAC for HF oxygen.  repeated a cxray 5/4 - Interval development of increased opacification within the right lung base, may reflect developing infiltrate versus atelectasis. started antibiotics- vanc and zosyn.  Consider pulmonary eval .  just completed  course of zosyn 4/29 5/6 tolerated 6L NC/ fio2 35% overnight . peer to peer with insurane today. EKG with sinus tachycardia   discussed [facility] pulmonology. monitor off antibiotics zosyn and vancomcyn as he completed adequate course of antibiotics for pseudomas and aspiration. CX ray improvement may lag behind treatment  5/7 stable on 5L/ fio2 28% overnight .completed peer to peer with insurance today . awaiting decision   5/9  stable on 5L/ fio2 28%. denied LTAC,. discussed with wife at the bedside. she wants PT/OT and SLP eval - to work with patient consistently . prefers rehab as second option  5/10 able to tolerate PMSV but aspiration with  swallow trials. NPO for  now. K of 5.2 repeat renal panel.  PT/OT to work with patient on regular basis. ambulated with PT today. PT recs rehab           Review of Systems:   Pain scale:   Constitutional:  fever,  chills, headache, vision loss, hearing loss, weight loss, Generalized weakness, falls, loss of smell, loss of taste, poor appetite,  sore throat, voice change,immunocompromised state.   Respiratory: cough, shortness of breath.   Cardiovascular: chest pain, dizziness, palpitations, orthopnea, swelling of feet, syncope  Gastrointestinal: nausea, vomiting, abdominal pain, diarrhea, black stool,  blood in stool, change in bowel habits  Genitourinary: hematuria, dysuria, urgency, frequency  Integument/Breast: rash,  pruritis  Hematologic/Lymphatic: easy bruising, lymphadenopathy  Musculoskeletal: arthralgias , myalgias, back pain, neck pain, knee pain  Neurological: confusion, seizures, tremors, slurred speech  Behavioral/Psych:  depression, anxiety, auditory or visual hallucinations        OBJECTIVE:     Physical Exam:  Body mass index is 20.92 kg/m².       Constitutional: Appears  thin built   Head: Normocephalic and atraumatic.   Neck: Normal range of motion. Neck supple. trach collar  Cardiovascular: Normal heart rate.  Regular heart rhythm.  Pulmonary/Chest: Effort normal.   Abdominal: No distension.  No tenderness, PEG tube insitu   Musculoskeletal: Normal range of motion. No edema.   Neurological: Alert and oriented to person, place, and time.   Skin: Skin is warm and dry.   Psychiatric: Normal mood and affect. Behavior is normal.       Vital Signs  Temp: 97.1 °F (36.2 °C) (05/10/22 1146)  Pulse: 102 (05/10/22 1528)  Resp: 19 (05/10/22 1528)  BP: 107/74 (05/10/22 1146)  SpO2: 96 % (05/10/22 1621)     24 Hour VS Range    Temp:  [97.1 °F (36.2 °C)-97.6 °F (36.4 °C)]   Pulse:  []   Resp:  [18-26]   BP: (107-144)/(74-86)   SpO2:  [93 %-98 %]     Intake/Output Summary (Last 24 hours) at 5/10/2022  1705  Last data filed at 5/10/2022 1114  Gross per 24 hour   Intake 550 ml   Output 800 ml   Net -250 ml         I/O This Shift:  I/O this shift:  In: 550 [NG/GT:550]  Out: 200 [Urine:200]    Wt Readings from Last 3 Encounters:   05/04/22 62.4 kg (137 lb 9.1 oz)   04/06/22 63 kg (138 lb 14.2 oz)   04/04/22 63 kg (139 lb)       I have personally reviewed the vitals and recorded Intake/Output     Laboratory/Diagnostic Data:    CBC/Anemia Labs: Coags:    Recent Labs   Lab 05/08/22  0655 05/09/22  0328 05/10/22  0337   WBC 4.24 4.16 4.35   HGB 9.9* 9.7* 9.3*   HCT 31.8* 32.1* 30.6*    231 224   MCV 96 96 96   RDW 16.1* 16.1* 15.9*    No results for input(s): PT, INR, APTT in the last 168 hours.     Chemistries: ABG:   Recent Labs   Lab 05/08/22  0655 05/09/22  0328 05/10/22  0337 05/10/22  1031    139 140 140   K 4.3 4.6 5.2* 4.6   CL 98 101 102 100   CO2 31* 30* 30* 33*   BUN 16 21 22 22   CREATININE 0.6 0.7 0.7 0.8   CALCIUM 9.8 9.5 9.3 9.7   PROT 6.6 6.4 5.8*  --    BILITOT 0.2 0.2 0.2  --    ALKPHOS 79 77 76  --    ALT 12 10 9*  --    AST 14 13 13  --    MG 2.0 2.1 2.2  --    PHOS 4.1 4.4 4.8* 4.4    No results for input(s): PH, PCO2, PO2, HCO3, POCSATURATED, BE in the last 168 hours.     POCT Glucose: HbA1c:    No results for input(s): POCTGLUCOSE in the last 168 hours. No results found for: HGBA1C     Cardiac Enzymes: Ejection Fractions:    No results for input(s): CPK, CPKMB, MB, TROPONINI in the last 72 hours. EF   Date Value Ref Range Status   04/19/2022 60 % Final   12/30/2021 65 % Final          No results for input(s): COLORU, APPEARANCEUA, PHUR, SPECGRAV, PROTEINUA, GLUCUA, KETONESU, BILIRUBINUA, OCCULTUA, NITRITE, UROBILINOGEN, LEUKOCYTESUR, RBCUA, WBCUA, BACTERIA, SQUAMEPITHEL, HYALINECASTS in the last 48 hours.    Invalid input(s): WRIGHTSUR    Procalcitonin (ng/mL)   Date Value   04/19/2022 0.09     Lactate (Lactic Acid) (mmol/L)   Date Value   04/19/2022 1.0     BNP (pg/mL)   Date Value    04/22/2022 132 (H)   04/19/2022 1,607 (H)   03/21/2022 242 (H)   11/02/2019 260 (H)   12/27/2018 239 (H)     No results found for: CRP, SEDRATE  No results found for: DDIMER  Ferritin (ng/mL)   Date Value   02/16/2022 135     No results found for: LDH  Troponin I (ng/mL)   Date Value   04/19/2022 0.018   11/02/2019 0.016   12/27/2018 <0.006     No results found for this or any previous visit.  SARS-CoV2 (COVID-19) Qualitative PCR (no units)   Date Value   05/02/2022 Not Detected   12/31/2021 Not Detected     SARS-CoV-2 RNA, Amplification, Qual (no units)   Date Value   01/13/2022 Negative   01/03/2022 Negative     POC Rapid COVID (no units)   Date Value   04/19/2022 Negative       Microbiology labs for the last week  Microbiology Results (last 7 days)     Procedure Component Value Units Date/Time    Culture, Respiratory with Gram Stain [[id number]]     Order Status: No result Specimen: Respiratory           Reviewed and noted in plan where applicable- Please see chart for full lab data.    Lines/Drains:       Peripheral IV - Single Lumen 04/25/22 2300 22 G Anterior;Distal;Left Upper Arm (Active)   Site Assessment Clean;Dry;Intact 05/05/22 0318   Extremity Assessment Distal to IV No abnormal discoloration 05/04/22 1100   Line Status Saline locked 05/04/22 1100   Dressing Status Clean;Dry;Intact 05/04/22 1100   Dressing Intervention Integrity maintained 05/04/22 1100   Dressing Change Due 04/28/22 04/28/22 0912   Site Change Due 04/26/22 04/28/22 0912   Reason Not Rotated Poor venous access 05/03/22 0735   Number of days: 9            Gastrostomy/Enterostomy 01/04/22 Percutaneous endoscopic gastrostomy (PEG) LUQ (Active)   Securement secured to abdomen 05/04/22 1100   Interventions Prior to Feeding patency checked;residual checked 05/04/22 1100   Feeding Type bolus 05/04/22 1100   Clamp Status/Tolerance clamped;no abdominal discomfort;no abdominal distention;no emesis;no nausea;no residual;no restlessness 05/04/22  1100   Feeding Action feeding continued 05/03/22 0735   Dressing dry and intact 05/04/22 1100   Insertion Site no redness;no warmth;no drainage;no tenderness;no swelling 05/04/22 1100   Site Care secured w/ tape 05/04/22 1100   Flush/Irrigation flushed w/;water;no resistance met 05/04/22 1100   Dressing Change Due 04/28/22 04/28/22 0912   Intake (mL) 60 mL 04/30/22 1250   Water Bolus (mL) 50 mL 05/03/22 1700   Tube Output(mL)(Include Discarded Residual) 0 mL 04/28/22 1700   Formula Name Isosource 05/03/22 0735   Tube Feeding Intake (mL) 375 05/03/22 1700   Residual Amount (ml) 0 ml 05/04/22 1100   Length Of Feeding (Min) 15 04/28/22 0912   Number of days: 121       Imaging  ECG Results          EKG 12-lead (Final result)  Result time 04/19/22 17:42:02    Final result by Interface, Lab In [facility] (04/19/22 17:42:02)             Narrative:    Test Reason : R06.02,    Vent. Rate : 081 BPM     Atrial Rate : 081 BPM     P-R Int : 158 ms          QRS Dur : 092 ms      QT Int : 366 ms       P-R-T Axes : 061 -70 045 degrees     QTc Int : 425 ms    Normal sinus rhythm  Left axis deviation Now present  Incomplete right bundle branch block  Abnormal ECG  When compared with ECG of 10-MADONNA-2022 10:11,    Confirmed by DIONNE SINGH MD ([id number]) on 4/19/2022 5:41:52 PM    Referred By: [email or web address]   SELF           Confirmed By:DIONNE SINGH MD                            Results for orders placed during the hospital encounter of 04/19/22    Echo    Interpretation Summary  · The left ventricle is normal in size with normal systolic function. The estimated ejection fraction is 60%.  · Normal right ventricular size with normal right ventricular systolic function.  · Normal left ventricular diastolic function.  · The aortic root is mildly dilated.  · Mechanically ventilated; cannot use inferior caval vein diameter to estimate central venous pressure. The IVC is not enlarged and does collapse somewhat with the respiratory cycle, essentially  ruling out venous hypertension.      X-Ray Chest AP Portable  Narrative: EXAMINATION:  XR CHEST AP PORTABLE    CLINICAL HISTORY:  hypoxia;    TECHNIQUE:  Single frontal view of the chest was performed.    COMPARISON:  Chest radiograph 04/22/2022, 04/21/2022, 04/19/2022.    FINDINGS:  Monitoring leads overlie the thorax.  There is a tracheostomy cannula in stable positioning.  The trachea is midline.  Cardiomediastinal silhouette is stable.  There is aortic plaque.  There are small bilateral pleural effusions, probably increased on the left.  Additionally, there is increased opacification over the right lung base, which may reflect atelectasis or developing infiltrate.  No pneumothorax.  The osseous structures appear intact.    There are postoperative changes in the right axillary region there is no evidence of free air beneath the hemidiaphragms.  Impression: Interval development of increased opacification within the right lung base, may reflect developing infiltrate versus atelectasis.    Small bilateral pleural effusions, increased on the left as compared to prior.    Electronically signed by resident: Tim Aparicio  Date:    05/04/2022  Time:    08:52    Electronically signed by: Daniel Álvarez MD  Date:    05/04/2022  Time:    09:12      Labs, Imaging, EKG and Diagnostic results from 5/10/2022 were reviewed.    Medications:  Medication list was reviewed and changes noted under Assessment/Plan.  No current facility-administered medications on file prior to encounter.     Current Outpatient Medications on File Prior to Encounter   Medication Sig Dispense Refill    amLODIPine (NORVASC) 10 MG tablet Take 10 mg by mouth once daily.      atorvastatin (LIPITOR) 20 MG tablet 1 tablet (20 mg total) by Per G Tube route once daily. 90 tablet 3    glycopyrrolate (CUVPOSA) 1 mg/5 mL (0.2 mg/mL) Soln Take 5 mLs (1 mg total) by mouth 3 (three) times daily as needed (TO REDUCE SECRETIONS). 473 mL 1    acetaminophen (TYLENOL)  325 MG tablet 2 tablets (650 mg total) by Per G Tube route every 6 (six) hours.  0    albuterol-ipratropium (DUO-NEB) 2.5 mg-0.5 mg/3 mL nebulizer solution Take 3 mLs by nebulization every 4 (four) hours as needed for Wheezing. Rescue 75 mL 0    aspirin 81 MG Chew 1 tablet (81 mg total) by Per G Tube route once daily.  0    bisacodyL (DULCOLAX) 10 mg Supp Place 1 suppository (10 mg total) rectally daily as needed.  0    clopidogreL (PLAVIX) 75 mg tablet 1 tablet (75 mg total) by Per G Tube route once daily. 30 tablet 11    duke's soln (benadryl 30 mL, mylanta 30 mL, LIDOcaine 30 mL, nystatin 30 mL) 120mL Take 10 mLs by mouth 4 (four) times daily as needed (mucositis). 360 mL 0    enoxaparin (LOVENOX) 40 mg/0.4 mL Syrg Inject 0.4 mLs (40 mg total) into the skin once daily.      folic acid (FOLVITE) 1 MG tablet 1 tablet (1 mg total) by Per G Tube route once daily.  0    guaiFENesin 200 mg/5 mL Liqd Take 400 mg by mouth every 4 (four) hours as needed (for secretions). 473 mL 3    losartan (COZAAR) 50 MG tablet 1 tablet (50 mg total) by Per G Tube route once daily. 90 tablet 3    melatonin (MELATIN) 3 mg tablet 2 tablets (6 mg total) by Per G Tube route nightly.  0    metoprolol tartrate (LOPRESSOR) 100 MG tablet 1 tablet (100 mg total) by Per G Tube route 2 (two) times daily. 60 tablet 11    ondansetron (ZOFRAN-ODT) 8 MG TbDL Take 1 tablet (8 mg total) by mouth every 8 (eight) hours as needed (nausea). 30 tablet 1    oxyCODONE (ROXICODONE) 5 mg/5 mL Soln 5 mLs (5 mg total) by Per G Tube route every 4 (four) hours as needed (Pain). 150 mL 0    polyethylene glycol (GLYCOLAX) 17 gram PwPk 17 g by Per G Tube route 2 (two) times daily.  0    senna-docusate 8.6-50 mg (PERICOLACE) 8.6-50 mg per tablet 1 tablet by Per G Tube route 2 (two) times daily.      sodium chloride (OCEAN) 0.65 % nasal spray 1 spray by Nasal route as needed for Congestion.  12    sodium chloride 3% 3 % nebulizer solution Take 4 mLs by  nebulization every 8 (eight) hours.      thiamine 100 MG tablet 1 tablet (100 mg total) by Per G Tube route once daily.       Scheduled Medications:  albuterol-ipratropium, 3 mL, Nebulization, Q4H  aspirin, 81 mg, Per G Tube, Daily  atorvastatin, 20 mg, Per G Tube, Daily  clopidogreL, 75 mg, Per G Tube, Daily  enoxaparin, 40 mg, Subcutaneous, Daily  folic acid, 1 mg, Per G Tube, Daily  guaiFENesin 100 mg/5 ml, 200 mg, Per G Tube, Q8H  melatonin, 6 mg, Per G Tube, Nightly  polyethylene glycol, 17 g, Per G Tube, BID  scopolamine, 1 patch, Transdermal, Q3 Days  thiamine, 100 mg, Per G Tube, Daily      PRN: sodium chloride, albuterol-ipratropium, bisacodyL, glycopyrrolate, ondansetron, oxyCODONE, sodium chloride, sodium chloride 0.9%  Infusions:   Estimated Creatinine Clearance: 72.6 mL/min (based on SCr of 0.8 mg/dL).    Assessment/Plan:      * Acute on chronic respiratory failure  Pt with SCC of the tongue s/p total glossectomy, chemowith cisplatin, and nearing completion of radiation with trach, COPD, asthma, HTN, CAD s/p PCI presenting for acute on chronic hypoxemic respiratory failure. He is on 5L with trach collar at home. He has had increased yellow secretions, work of breathing and SOB for the past several days. BNP elevated to 1600 on admission with pulmonary edema and new cardiomegaly on CXR. Trop wnl. Bicarb is elevated suggesting chronic hypercapnia. Suctioning has cleared thick mucous. He was able to be weaned down to his home O2, however after ~ 20 minutes he desats again, requiring deep suction with resolution of hypoxia. Now stable on home O2. Diuresed -3L     -Pt stable on home O2 for 24 hours (5 liters per NC), ready to stepdown to floor  - CAP coverage with ceftriaxone/azithro  - Methylprednisolone 40 mg x5 days for possible element of COPD exacerbation  - Duonebs q4  - Hypertonic saline nebs  - Chest PT  - Suction PRN    4/21 Transfer to hospital medicine . Squamous cell carcinoma  of the tongue s/p  glossectomy and flap w/tracheostomy, COPD, and HTN admitted to ICU for managemenet of acute hypercapnic respiratory failure.  initially requiring vent but has now been stable on home trach collar for 24 hours. sats 93% on 8L via trach collar.  Awaiting cultures from sputum, on CAP coverage with ceftriaxone. completed Azithromycin. continue solumedrol 40mg IV daily.   4/22 sats 95% on trach collar 10/ fio2 60% . completed azithromycin.   started on Zosyn for possible aspiration  repeat CX ray-in the inferior hemithorax on the left side consistent with airspace consolidation/volume loss in the left lower lobe is again appreciated, but the lung zones are essentially stable since the prior exam and demonstrate no new areas of airspace consolidation or volume loss.  respiratory culture 4/19 with pseudomonas.     4/30-  oxygen tapered to 8L/ Fio2 35% .  Needs to be at 5 liters to go home. Add guaifenisen for cough and secertion. Up in chair as much as possible  5/1 - not able to wean oxygen.  He may need a LTAC for HF oxygen.   5/3 - on 8 liters NC ( home goal is 5 liters)  5/4 - Now chronic, repeat cxr shows RLL pneumonia. Resume vanc , zosyn. Consult pulm  Up in chair helps to lower rate. He desats at night. This pt likely needs a LTAC for HF oxygen. Insurance company did not call me for a peer to peer yesterday.    5/5   sats 93 % on 8L NC/ fio2 35%. tapered to 5L/28% needs LTAC for HF oxygen.  repeated a cxray 5/4 - Interval development of increased opacification within the right lung base, may reflect developing infiltrate versus atelectasis. started antibiotics- vanc and zosyn. monitor for vancomycin toxity. plan to switch to doxycycline. Consider pulmonary eval .  just completed  course of zosyn 4/29   5/6 tolerated 6L NC/ fio2 35% overnight . peer to peer with blanca today. EKG with sinus tachycardia. discussed [facility] pulmonology. monitor off antibiotics zosyn and vancomcyn as he completed adequate course of  antibiotics for pseudomas and aspiration. CX ray improvement may lag behind treatment   5/7 stable on 5L/ fio2 28% overnight.      Goals of care, counseling/discussion  Advance Care Planning      Does patient have Capacity? Yes  Contact: Bridgett mitchell, wife  Goals/Wishes: wants to go home, HH w Home PT  Code Status- FULL  Pain management- seems comfortable without pain  Prognosis- s/p radiation, progressing cancer, high risk for aspiration, severe malnutrition  Family discussions-  4/28, 4/29 - discussed care and condition w pt and his wife.  He can talk w trach collar but sats drop.  He wants to go home. Has all oxygen supplies at home. Pt completed zosyn.   5/2 - spoke to his wife. Reviewed his care and condition. She was able to calm him down. She is encouraging him. Will start remeron for appetite and depression. She is working hard to keep him comfortable and well-cared for.  Functional Status - has trach and peg. recommended outpt f/u palliative care    Post acute care plan: pt would benefit from outpt referral to Palliative care, plan for HH and home.    5/4 - not able to wean oxygen.  He needs a LTAC for HF oxygen. Waiting on ins approval      Hypernatremia  4/25 sodium 150. started on D5W . repeat renal panel in PM   5/4- sodium trended down to 145 -> 140, on TFs -> 138 -> 139, not receiving extra water in TFs -> 141    Hypophosphatemia  Replaced  5/4- stable      Aspiration pneumonia  4/22 sats 95% on trach collar 10/ fio2 60% . completed azithromycin.   started on Zosyn for possible aspiration  repeat CX ray-in the inferior hemithorax on the left side consistent with airspace consolidation/volume loss in the left lower lobe is again appreciated, but the lung zones are essentially stable since the prior exam and demonstrate no new areas of airspace consolidation or volume loss.  respiratory culture 4/19 with pseudomonas.   -  completed zosyn  5/4 - new infiltrate RLL. Resume zosyn, vanc, consult pulm     Sinus  tachycardia        Dysphagia  Nutrition consulted. Most recent weight and BMI monitored-          Malnutrition (Moderate to Severe)  Weight Loss (Malnutrition): greater than 10% in 6 months                 Measurements:  Wt Readings from Last 1 Encounters:   05/04/22 62.4 kg (137 lb 9.1 oz)   Body mass index is 20.92 kg/m².    Recommendations: Recommendation/Intervention: 1.  Goals: Meet % EEN, EPN by RD f/u date    Patient has been screened and assessed by RD. RD will follow patient.    4/21 secondary to above. on G tube feedings   4/22 changed to bolus GT feedings.   4/29 - pt noted to sometimes refuse TFs. , on bolus TFs, no water added      Hypokalemia  replaced   5/4 - stable      Chronic respiratory failure with hypoxia, on home O2 therapy  secondary to COPD. on 5L oxygen  at home  5/4  - continue weaning efforts, currently on 8 liters  5/5  past week not able to wean oxygen.   sats 93 % on 8L NC/ fio2 35%. needs LTAC for HF oxygen.  repeated a cxray 5/4 - Interval development of increased opacification within the right lung base, may reflect developing infiltrate versus atelectasis. started antibiotics- vanc and zosyn.  Consulted pulmonary.  just completed  course of zosyn 4/29     Protein-calorie malnutrition  Nutrition consulted. Most recent weight and BMI monitored-          Malnutrition (Moderate to Severe)  Weight Loss (Malnutrition): greater than 10% in 6 months              Measurements:  Wt Readings from Last 1 Encounters:   05/04/22 62.4 kg (137 lb 9.1 oz)   Body mass index is 20.92 kg/m².    Recommendations: Recommendation/Intervention: 1.  Goals: Meet % EEN, EPN by RD f/u date    Patient has been screened and assessed by RD. RD will follow patient.  See above      Normocytic anemia    Patient's with Normocytic anemia.. Hemoglobin stable. Etiology likely due to chronic disease .  Current CBC reviewed-    Recent Labs   Lab 05/02/22  0632 05/03/22  0457 05/04/22  0500   HGB 9.5* 9.5* 9.4*      Monitor CBC and transfuse if H/H drops below 7/21.   - improving      Other hyperlipidemia  Continue home statin         Primary hypertension  Chronic, controlled.  Latest blood pressure and vitals reviewed-   Temp:  [97.3 °F (36.3 °C)-98.5 °F (36.9 °C)]   Pulse:  []   Resp:  [17-25]   BP: ()/(64-81)   SpO2:  [89 %-97 %] .   Home meds for hypertension were reviewed- amlodipine, losartan and metoprolol  held for now  PRN meds if BP> 180/110 mm HG  5/4 - not requiring scheduled meds      Coronary artery disease involving native coronary artery of native heart without angina pectoris  Continue ASA, plavix, and statin   -stable    Squamous cell cancer of tongue    12/15/21 FNA left neck mass : squamous cell carcinoma, p16 negative  1/4/22 total glossectomy, bilateral neck dissection, bilateral cervical facial advancement flaps and anterolateral thigh free flap reconstruction of his glossectomy defect.  Final path :  [id number] (+microscopic SALINAS, single contralateral node), superior soft tissue margin of left neck with tumor.  2/28/22 start chemoradiation  Finished chemo with cisplatin 4/6. Was going to complete final dose of radiation tomorrow.     -Rad/onc consulted for radiation while inpatient  4/22  s/p radiation oncology eval -on adjuvant chemoradiation, completed 31/33 fractions of RT.  renitiation of RT  inpatient when patient able to tolerate.    4/23  completed radiation sessions  5/4 -attempting to  wean oxygen, takes 5 liters at home and has all supplies.     VTE Risk Mitigation (From admission, onward)         Ordered     enoxaparin injection 40 mg  Daily         04/19/22 1123     IP VTE HIGH RISK PATIENT  Once         04/19/22 1123     Place sequential compression device  Until discontinued         04/19/22 1123                Discharge Planning   NISA: 5/12/2022     Code Status: Full Code   Is the patient medically ready for discharge?: Yes    Reason for patient still in hospital (select all  that apply): Treatment  Discharge Plan A: Long-term acute care facility (LTAC)   Discharge Delays: None known at this time              Jordan Armenta MD  Department of Hospital Medicine   [facility] - Telemetry Stepdown

## 2022-05-10 NOTE — PLAN OF CARE
Problem: Occupational Therapy  Goal: Occupational Therapy Goal  Description: Goals to be met by: 05-25-22     Patient will increase functional independence with ADLs by performing:    UE Dressing with Set-up Assistance.  LE Dressing with Supervision  Grooming while standing at sink with Supervision.  Toileting from toilet with Supervision for hygiene and clothing management.   Supine to sit with [place].  Stand pivot transfers with Supervision with RW  Toilet transfer to toilet with Supervision.  Pt. To be I with HEP to BUE to assist with improving overall level of endurance    Outcome: Ongoing, Progressing

## 2022-05-10 NOTE — PLAN OF CARE
Problem: SLP  Goal: SLP Goal  Description: Speech Language Pathology Goals  Goals expected to be met by 5/17:    1. Patient will participate in ongoing swallow assessment.   2. Pt will tolerate PMSV with O2 >93% and VSS to improve respiration and phonation.   3. Pt will demonstrate placement/removal of PMSV x5 indep.   4. Pt will vocalize x3 with PMSV in place  5. Pt will recall instructions/precautions of PMSV with 100% acc indep.      Outcome: Ongoing, Progressing

## 2022-05-10 NOTE — PLAN OF CARE
Problem: Noninvasive Ventilation Acute  Goal: Effective Unassisted Ventilation and Oxygenation  5/10/2022 0337 by Maria Del Rosario Garcia RN  Outcome: Ongoing, Progressing  5/10/2022 0336 by Maria Del Rosario Garcia RN  Outcome: Ongoing, Progressing     Problem: Skin and Tissue Injury (Artificial Airway)  Goal: Absence of Device-Related Skin or Tissue Injury  5/10/2022 0337 by Maria Del Rosario Garcia RN  Outcome: Ongoing, Progressing  5/10/2022 0336 by Maria Del Rosario Garcia RN  Outcome: Ongoing, Progressing     Problem: Device-Related Complication Risk (Artificial Airway)  Goal: Optimal Device Function  5/10/2022 0337 by Maria Del Rosario Garcia RN  Outcome: Ongoing, Progressing  5/10/2022 0336 by Maria Del Rosario Garcia RN  Outcome: Ongoing, Progressing     Problem: Communication Impairment (Artificial Airway)  Goal: Effective Communication  5/10/2022 0337 by Maria Del Rosario Garcia RN  Outcome: Ongoing, Progressing  5/10/2022 0336 by Maria Del Rosario Garcia RN  Outcome: Ongoing, Progressing     Problem: Skin Injury Risk Increased  Goal: Skin Health and Integrity  5/10/2022 0337 by Maria Del Rosario Garcia RN  Outcome: Ongoing, Progressing  5/10/2022 0336 by Maria Del Rosario Garcia RN  Outcome: Ongoing, Progressing

## 2022-05-10 NOTE — PT/OT/SLP EVAL
Occupational Therapy  Co- Evaluation/tx    Name: Fareed Richard Jr.  MRN: [id number]  Admitting Diagnosis:  Acute on chronic respiratory failure  Recent Surgery: * No surgery found *    Co-treatment performed due to patient's multiple deficits requiring two skilled therapists  to appropriately and safely assess patient's strength and endurance while facilitating functional tasks in addition to accommodating for patient's activity tolerance.     Recommendations:     Discharge Recommendations: rehabilitation facility  Discharge Equipment Recommendations:  none  Barriers to discharge:  Other (Comment), Inaccessible home environment (requires increased assist)    Assessment:     Fareed Richard Jr. is a 72 y.o. male with a medical diagnosis of Acute on chronic respiratory failure.  He presents with deficits in self-care tasks, endurance and mobility .HR noted to be elevated with activitiy to 125.  Pt. 02 sats with ambulation decreased to 89% on 5 Liters 02 but with recovery to lower 90's once seated. Pt reqired Min A to clean buttocks region in stand and Min A to don gown like robe. Pt cooperative during session and followed all commands. Communication noted to be difficult with trach and increased secretions noted. Pt. Would benefit from continued OT services to maximize level of safety and I with ADL tasks.  Performance deficits affecting function: weakness, impaired endurance, impaired self care skills, impaired functional mobilty, gait instability, impaired cardiopulmonary response to activity.      Rehab Prognosis: Good; patient would benefit from acute skilled OT services to address these deficits and reach maximum level of function.       Plan:     Patient to be seen 3 x/week to address the above listed problems via self-care/home management, therapeutic activities, therapeutic exercises, neuromuscular re-education  · Plan of Care Expires: 06/09/22  · Plan of Care Reviewed with: patient    Subjective     Chief  Complaint: No specific complaints noted.   Patient/Family Comments/goals: To get better     Occupational Profile:  Living Environment: Pt. Resides with spouse in ss house with 5 steps to enter and BHR. Pt. Has a tub shower.   Previous level of function: pt. Reports I with ADL/IADL tasks   Roles and Routines: spouse, caretaker of self  Equipment Used at Home:  cane, straight, walker, rolling, wheelchair, shower chair, hospital bed  Assistance upon Discharge: spouse pt. Reported she works from  home    Pain/Comfort:  · Pain Rating 1: 0/10  · Pain Rating Post-Intervention 1: 0/10    Patients cultural, spiritual, Denominational conflicts given the current situation: no    Objective:     Communicated with: nurse prior to session.  Patient found supine with Tracheostomy, oxygen, telemetry, pulse ox (continuous) upon OT entry to room.    General Precautions: Standard, fall, respiratory, NPO  Orthopedic Precautions:N/A   Braces: N/A  Respiratory Status: 5lpm through trach collar    Occupational Performance:    Bed Mobility:    · Patient completed Supine to Sit with supervision    Functional Mobility/Transfers:  · Patient completed Sit <> Stand Transfer with contact guard assistance  with  rolling walker   · Patient completed Bed <> Chair Transfer using Stand Pivot technique with contact guard assistance with rolling walker  · Functional Mobility: Pt. Ambulated ~ 15 feet with RW and CGA    Activities of Daily Living:  · Upper Body Dressing: minimum assistance to don gown like robe  · Lower Body Dressing: stand by assistance to don socks in long sit EOB  · Toileting: contact guard assistance in stand to wipe buttocks region    Cognitive/Visual Perceptual:  Cognitive/Psychosocial Skills:     -       Oriented to: Person, Place and Situation   -       Follows Commands/attention:Follows multistep  commands  -       Communication: through gestures, nods and writing  -       Memory: No Deficits noted  -       Safety awareness/insight  to disability: intact   -       Mood/Affect/Coping skills/emotional control: Appropriate to situation  Visual/Perceptual:      -wears glasses    Physical Exam:  Balance: -       sit: supervision; stand CGA  Postural examination/scapula alignment:    -       Rounded shoulders  -       Forward head  -       Posterior pelvic tilt  Skin integrity: Bruising of BUE noted  Dominant hand: -       right  Upper Extremity Range of Motion:     -       Right Upper Extremity: WFL  -       Left Upper Extremity: WFL  Upper Extremity Strength:    -       Right Upper Extremity: WFL  -       Left Upper Extremity: WFL   Strength:    -       Right Upper Extremity: WFL  -       Left Upper Extremity: WFL    AMPAC 6 Click ADL:  AMPAC Total Score: 20    Treatment & Education:  Pt. Educated on role of OT and POC  Pt. Educated on safety with ADL tasks and mobility  Pt educated on importance of OOB/therapy/ as well as need for staff assist for all mobility  Education:    Patient left up in chair with all lines intact, call button in reach and nurse notified    GOALS:   Multidisciplinary Problems     Occupational Therapy Goals        Problem: Occupational Therapy    Goal Priority Disciplines Outcome Interventions   Occupational Therapy Goal     OT, PT/OT     Description: Goals to be met by: 05-25-22     Patient will increase functional independence with ADLs by performing:    UE Dressing with Set-up Assistance.  LE Dressing with Supervision  Grooming while standing at sink with Supervision.  Toileting from toilet with Supervision for hygiene and clothing management.   Supine to sit with [place].  Stand pivot transfers with Supervision with RW  Toilet transfer to toilet with Supervision.  Pt. To be I with HEP to BUE to assist with improving overall level of endurance                     History:     Past Medical History:   Diagnosis Date    Cancer     skin to Rt forearm and face    COPD (chronic obstructive pulmonary disease)      Hyperlipidemia     Hypertension     Pseudoaneurysm        Past Surgical History:   Procedure Laterality Date    ABDOMINAL SURGERY      stents placed in liver and large intestines, per patient    CAROTID STENT      CORONARY STENT PLACEMENT  01/2000    DISSECTION OF NECK Bilateral 1/4/2022    Procedure: DISSECTION, NECK;  Surgeon: Naeem Smalls MD;  Location: [facility] OR [facility];  Service: ENT;  Laterality: Bilateral;    ESOPHAGOGASTRODUODENOSCOPY W/ PEG N/A 1/4/2022    Procedure: EGD, WITH PEG TUBE INSERTION;  Surgeon: Anthony Calabrese MD;  [facility];  Service: General;  Laterality: N/A;    EYE SURGERY      Cataract bilateral    femoral stents      bilateral    FLAP PROCEDURE N/A 1/3/2022    Procedure: CREATION, FREE FLAP;  Surgeon: Naeem Smalls MD;  [facility];  Service: ENT;  Laterality: N/A;    FLAP PROCEDURE Right 1/4/2022    Procedure: CREATION, FREE FLAP;  Surgeon: Stacey Conde MD;  [facility];  Service: ENT;  Laterality: Right;  Ischemic start 1203  Ischemic stop 1353    GLOSSECTOMY N/A 1/4/2022    Procedure: GLOSSECTOMY;  Surgeon: Naeem Smalls MD;  [facility];  Service: ENT;  Laterality: N/A;    RADICAL NECK DISSECTION Left 1/3/2022    Procedure: DISSECTION, NECK, RADICAL;  Surgeon: Naeem Smalls MD;  [facility];  Service: ENT;  Laterality: Left;    SKIN CANCER EXCISION      TRACHEOTOMY N/A 1/4/2022    Procedure: TRACHEOTOMY;  Surgeon: Naeem Smalls MD;  [facility];  Service: ENT;  Laterality: N/A;       Time Tracking:     OT Date of Treatment: 05/10/22  OT Start Time: 1036  OT Stop Time: 1100  OT Total Time (min): 24 min    Billable Minutes:Evaluation 15  Self Care/Home Management 9    5/10/2022

## 2022-05-10 NOTE — PLAN OF CARE
Problem: Adult Inpatient Plan of Care  Goal: Plan of Care Review  Outcome: Ongoing, Progressing  Goal: Patient-Specific Goal (Individualized)  Outcome: Ongoing, Progressing  Goal: Absence of Hospital-Acquired Illness or Injury  Outcome: Ongoing, Progressing  Goal: Optimal Comfort and Wellbeing  Outcome: Ongoing, Progressing  Goal: Readiness for Transition of Care  Outcome: Ongoing, Progressing     Problem: Impaired Wound Healing  Goal: Optimal Wound Healing  Outcome: Ongoing, Progressing    Pt in bed with eyes open, AOX4, able to communicate needs, emesis episode  with SP this am during a swallow eval, tolerated PT and OT well, up in chair most of the day, trach care provided x2, bolus feedings x3, tolerated well, no distress noted, no c/o pain, bed locked in lowest position, call bell in reach, instructed to call when assistance needed.

## 2022-05-10 NOTE — PT/OT/SLP EVAL
Speech Language Pathology Evaluation  Bedside Swallow and [place] Speaking Valve Evaluation     Patient Name:  Fareed Richard Jr.   MRN:  [id number]  Admitting Diagnosis: Acute on chronic respiratory failure    Recommendations:                 General Recommendations:  Dysphagia therapy and One Way Speaking Valve  Diet recommendations:  NPO, NPO   Aspiration Precautions: Strict aspiration precautions   General Precautions: Standard, fall, respiratory  Communication strategies:  provide increased time to answer    History:     Past Medical History:   Diagnosis Date    Cancer     skin to Rt forearm and face    COPD (chronic obstructive pulmonary disease)     Hyperlipidemia     Hypertension     Pseudoaneurysm        Past Surgical History:   Procedure Laterality Date    ABDOMINAL SURGERY      stents placed in liver and large intestines, per patient    CAROTID STENT      CORONARY STENT PLACEMENT  01/2000    DISSECTION OF NECK Bilateral 1/4/2022    Procedure: DISSECTION, NECK;  Surgeon: Naeem Smalls MD;  Location: [facility] OR [address] [place] [address];  Service: ENT;  Laterality: Bilateral;    ESOPHAGOGASTRODUODENOSCOPY W/ PEG N/A 1/4/2022    Procedure: EGD, WITH PEG TUBE INSERTION;  Surgeon: Anthony Calabrese MD;  Location: [facility] OR [address] [place] [address];  Service: General;  Laterality: N/A;    EYE SURGERY      Cataract bilateral    femoral stents      bilateral    FLAP PROCEDURE N/A 1/3/2022    Procedure: CREATION, FREE FLAP;  Surgeon: Naeem Smalls MD;  Location: [facility] OR [address] [place] [address];  Service: ENT;  Laterality: N/A;    FLAP PROCEDURE Right 1/4/2022    Procedure: CREATION, FREE FLAP;  Surgeon: Stacey Conde MD;  Location: [facility] OR [address] [place] [address];  Service: ENT;  Laterality: Right;  Ischemic start 1203  Ischemic stop 1353    GLOSSECTOMY N/A 1/4/2022    Procedure: GLOSSECTOMY;  Surgeon: Naeem Smalls MD;  Location: [facility] OR [address] [place] [address];  Service: ENT;  Laterality: N/A;    RADICAL NECK DISSECTION Left 1/3/2022    Procedure: DISSECTION,  "NECK, RADICAL;  Surgeon: Naeem Smalls MD;  Location: [address];  Service: ENT;  Laterality: Left;    SKIN CANCER EXCISION      TRACHEOTOMY N/A 1/4/2022    Procedure: TRACHEOTOMY;  Surgeon: Naeem Smalls MD;  Location: [facility] OR [address] [place] [address];  Service: ENT;  Laterality: N/A;       Social History: Patient living with spouse    Prior diet: NPO since total glossectomy 1/4/22. Patient reports he has not seen speech therapy since last hospital admission.      Modified Barium Swallow Study:  "ImpressionsPatient demonstrates severe oropharyngeal dysphagia characterized by reduced pharyngeal swallow strength s/p glossectomy and trach. Patient with silent aspiration of both thin and nectar-thick liquids. Cued throat clear/cough was moderately effective in clearing airway. Patient at significant risk of aspiration with all PO intake at this time."     Subjective     Patient awake and alert. Seated upright in bed. No family members present.     Pain/Comfort:  ·  No c/o pain    Respiratory Status: trach collar    Objective:     Oral Musculature Evaluation  · Oral Musculature: WFL  · Dentition: edentulous  · Secretion Management: adequate  · Mucosal Quality: good  · Oral Labial Strength and Mobility: WFL  · Velar Elevation: WFL  · Buccal Strength and Mobility: WFL  · Voice Prior to PO Intake: clear    Passy [place] Speaking Valve  Trach size/type: Shiley with tapered inner canula   Able to phonate around trach: No  Vocal quality with digital finger occlusion: strong, clear  O2 sats at rest: 94%  O2 sats with PMSV in place: 89%  Vocal quality with valve in place: present  Back pressure upon removal: none  Minutes PMSV worn: 6min  Education topics addressed: cleaning valve, one-way technology, anatomy of trach, precautions with cuffed trach, removal of valve prior to sleeping    Bedside Swallow Eval:   Consistencies Assessed:  · Thin liquids via 1/2 tsp x1     Oral Phase:   · Poor oral acceptance    Pharyngeal Phase: "   · coughing/choking immediately.   · Blatant signs of airway compromise.     Patient coughing s/p ice chip trial.  While coughing, patient noted with what appeared to be tube feedings projecting from trach hub.  RN immediately called to patient's room per RN. MD notified.  Oxygen from 94% to 89%. Recovered with removal of PMSV.  Not appearing to be in respiratory distress.     Assessment:     Fareed Richard Jr. is a 72 y.o. male with an SLP diagnosis of Dysphagia and One Way Speaking Valve Training.      Goals:   Multidisciplinary Problems     SLP Goals        Problem: SLP    Goal Priority Disciplines Outcome   SLP Goal     SLP Ongoing, Progressing   Description: Speech Language Pathology Goals  Goals expected to be met by 5/17:    1. Patient will participate in ongoing swallow assessment.   2. Pt will tolerate PMSV with O2 >93% and VSS to improve respiration and phonation.   3. Pt will demonstrate placement/removal of PMSV x5 indep.   4. Pt will vocalize x3 with PMSV in place  5. Pt will recall instructions/precautions of PMSV with 100% acc indep.                             Plan:     · Patient to be seen:  3 x/week   · Plan of Care expires:     · Plan of Care reviewed with:  patient   · SLP Follow-Up:  Yes       Discharge recommendations:  rehabilitation facility   Barriers to Discharge:  None    Time Tracking:     SLP Treatment Date:   05/10/22  Speech Start Time:  0935  Speech Stop Time:  1000     Speech Total Time (min):  25 min    Billable Minutes: Eval Swallow and Oral Function 8, Evaluation Use/Fit Voiced Prosthetic 9 and Self Care/Home Management Training 8    05/10/2022

## 2022-05-10 NOTE — PT/OT/SLP PROGRESS
Physical Therapy Co-Treatment    Patient Name:  Fareed Richard Jr.   MRN:  [id number]    Recommendations:     Discharge Recommendations:  rehabilitation facility   Discharge Equipment Recommendations: none   Barriers to discharge: Inaccessible home and increased assistance required    Assessment:     Fareed Richard Jr. is a 72 y.o. male admitted with a medical diagnosis of Acute on chronic respiratory failure.  He presents with the following impairments/functional limitations:  weakness, impaired endurance, impaired functional mobilty, gait instability, impaired balance, decreased safety awareness, impaired cardiopulmonary response to activity.  Pt participated in functional mobility training on this date, able to ambulate ~5 ft to chair and then ~15 ft within room with RW and CGA. Pt notably with tachycardia with HR up to 127 bpm during exertion and oxygen desaturation down to 89% during short activity bouts with grossly reduced endurance and energy reserve. Pt continues to present below their independent prior functional baseline. Pt would benefit from continued, skilled PT while in house to address the above listed impairments, further progress mobility as able, return towards highest level of function.      Rehab Prognosis: Fair; patient continues to benefit from acute skilled PT services to address these deficits and reach maximum level of function.  Patient remains most appropriate to discharge to rehabilitation facility  Recent Surgery: * No surgery found *      Plan:     During this hospitalization, patient to be seen 3 x/week to address the identified rehab impairments via gait training, therapeutic activities, therapeutic exercises, neuromuscular re-education and progress toward the following goals:    · Plan of Care Expires:  05/23/22    Subjective     Subjective: Pt mouthing words to communicate 2/2 trach    Pain/Comfort:   · Pain Rating 1: 0/10      Objective:     Communicated with RN prior to session.   Patient found supine with Tracheostomy, telemetry, pulse ox (continuous), oxygen upon PT entry to room.     Co-treatment performed for this visit due to patient need for two skilled therapists to ensure patient and staff safety and to accommodate for patient activity tolerance/pain management     General Precautions: Standard, fall   Orthopedic Precautions:N/A   Braces: N/A     Functional Mobility:  · Bed Mobility: supervision for supine>sit with HOB elevated ~30 degrees   · Transfers: CGA for sit<>stand from EOB and recliner with use of RW, cueing for hand placement and technique  · Gait: Patient ambulated on 2 short ambulation attempts, first ~5 ft to chair and then ~15 ft within room with RW and CGA on 6L. HR increased up to 127 bpm during gait, notably in 120s throughout all standing activity associated with oxygen desaturation with exertion. Pt demo's reduced tammi, narrow KATIE, and reduced step length. Appropriate RW management.   · Balance:   · Sitting: intact, pt able to sit EOB for ~8 minutes with supervision with appropriate trunk control  · Standing: impaired; pt benefits from use of RW to improve trunk control while standing, no overt LOB noted. Pt able to perform uniliateral UE ADLs while standing with compensating with anterior trunk sway with appropriate trunk control.     AM-PAC 6 CLICK MOBILITY  Turning over in bed (including adjusting bedclothes, sheets and blankets)?: 3  Sitting down on and standing up from a chair with arms (e.g., wheelchair, bedside commode, etc.): 3  Moving from lying on back to sitting on the side of the bed?: 3  Moving to and from a bed to a chair (including a wheelchair)?: 3  Need to walk in hospital room?: 3  Climbing 3-5 steps with a railing?: 3  Basic Mobility Total Score: 18       Education:  Education provided re: d/c planning, PT POC, benefits of mobility, pursed lip breathing. Pt endorsed understanding.     Patient left up in chair with all lines intact, call button  in reach and RN notified.    GOALS:   Multidisciplinary Problems     Physical Therapy Goals        Problem: Physical Therapy    Goal Priority Disciplines Outcome Goal Variances Interventions   Physical Therapy Goal     PT, PT/OT Ongoing, Progressing     Description: Goals to be met by: 22, updated on 22    Patient will increase functional independence with mobility by performin. Pt will perform supine<>sit with supervision to improve independence with mobility  2. Pt will perform functional transfers with supervision   3. Pt will ambulate >150 ft feet with LRAD and supervision to safely perform household distance ambulation  4. Pt will ascend/descend 5 stairs with supervision to safely manage within home.                    Time Tracking:     PT Received On: 05/10/22  PT Start Time: 1036     PT Stop Time: 1100  PT Total Time (min): 24 min     Billable Minutes: Gait Training 10 min and Therapeutic Activity 14 min    Treatment Type: Treatment  PT/PTA: PT     PTA Visit Number: 1     Beatriz Castro PT, DPT  5/10/2022

## 2022-05-10 NOTE — NURSING
Bed bath given, gown changed, suction canister, yanker, and tubing changed, dressing changed on Gtube and trach.

## 2022-05-10 NOTE — RESPIRATORY THERAPY
RAPID RESPONSE RESPIRATORY THERAPY PROACTIVE NOTE           Time of visit: 810    Code Status: Full Code   : 1950  Bed: 8094/8094 A:   MRN: [id number]  Time spent at the bedside: < 15 min    SITUATION    Evaluated patient for: LDA Check     BACKGROUND    Patient has a past medical history of Cancer, COPD (chronic obstructive pulmonary disease), Hyperlipidemia, Hypertension, and Pseudoaneurysm.  Clinically Significant Surgical Hx: tracheostomy    24 Hours Vitals Range:  Temp:  [97.3 °F (36.3 °C)-97.6 °F (36.4 °C)]   Pulse:  []   Resp:  [18-22]   BP: (120-144)/(76-79)   SpO2:  [94 %-98 %]     Labs:    Recent Labs     22  0655 22  0328 05/10/22  0337    139 140   K 4.3 4.6 5.2*   CL 98 101 102   CO2 31* 30* 30*   CREATININE 0.6 0.7 0.7   GLU 96 101 90   PHOS 4.1 4.4 4.8*   MG 2.0 2.1 2.2        No results for input(s): PH, PCO2, PO2, HCO3, POCSATURATED, BE in the last 72 hours.    ASSESSMENT/INTERVENTIONS    Upon arrival in room all trach supplies are at bedside including 4.0 and 6.0 cuffed shiley      Last VS   Temp: 97.4 °F (36.3 °C) (05/10 0300)  Pulse: 95 (05/10 0758)  Resp: 20 (05/10 0758)  BP: 144/78 (1939)  SpO2: 97 % (05/10 0758)    Level of Consciousness: Level of Consciousness (AVPU): alert  Respiratory Effort: Respiratory Effort: Normal Expansion/Accessory Muscle Usage: Expansion/Accessory Muscles/Retractions: expansion symmetric  All Lung Field Breath Sounds: All Lung Fields Breath Sounds: Anterior:, coarse  DEE Breath Sounds: coarse  LLL Breath Sounds: coarse  RUL Breath Sounds: diminished  RML Breath Sounds: diminished  RLL Breath Sounds: diminished  O2 Device/Concentration: Flow (L/min): 5, Oxygen Concentration (%): 28, O2 Device (Oxygen Therapy): Trach Collar  Surgical airway: Yes, Type: Shiley Size: 6, uncuffed  Ambu at bedside: Ambu bag with the patient?: Yes, Adult Ambu     Active Orders   Respiratory Care    Chest physiotherapy TID     Frequency: TID     Number of  Occurrences: Until Specified     Order Questions:      Indications: COPIOUS SPUTUM PRODUCTION      Indications: ATELECTASIS PROPHYLAXIS      Indications: ATELECTASIS NONRESPONSIVE TO TX    Inhalation Treatment Q4H     Frequency: Q4H     Number of Occurrences: Until Specified    Oxygen Continuous     Frequency: Continuous     Number of Occurrences: Until Specified     Order Questions:      Device type: Low flow      Device: Trach Collar (Comment: 8L)      FiO2%: 35%      Titrate O2 per Oxygen Titration Protocol: Yes      To maintain SpO2 goal of: >= 90%      Notify MD of: Inability to achieve desired SpO2; Sudden change in patient status and requires 20% increase in FiO2; Patient requires >60% FiO2    Pulse Oximetry Continuous     Frequency: Continuous     Number of Occurrences: Until Specified    Routine tracheostomy care     Frequency: BID     Number of Occurrences: Until Specified       RECOMMENDATIONS    We recommend: RRT Recs: Continue POC per primary team.    ESCALATION        FOLLOW-UP    Please call back the Rapid Response RT, Su Avendano, RRT at x [phone] for any questions or concerns.

## 2022-05-11 LAB
ALBUMIN SERPL BCP-MCNC: 2.7 G/DL (ref 3.5–5.2)
ALP SERPL-CCNC: 77 U/L (ref 55–135)
ALT SERPL W/O P-5'-P-CCNC: 10 U/L (ref 10–44)
ANION GAP SERPL CALC-SCNC: 9 MMOL/L (ref 8–16)
AST SERPL-CCNC: 14 U/L (ref 10–40)
BASOPHILS # BLD AUTO: 0.03 K/UL (ref 0–0.2)
BASOPHILS NFR BLD: 0.4 % (ref 0–1.9)
BILIRUB SERPL-MCNC: 0.2 MG/DL (ref 0.1–1)
BUN SERPL-MCNC: 26 MG/DL (ref 8–23)
CALCIUM SERPL-MCNC: 9.7 MG/DL (ref 8.7–10.5)
CHLORIDE SERPL-SCNC: 100 MMOL/L (ref 95–110)
CO2 SERPL-SCNC: 30 MMOL/L (ref 23–29)
CREAT SERPL-MCNC: 0.7 MG/DL (ref 0.5–1.4)
DIFFERENTIAL METHOD: ABNORMAL
EOSINOPHIL # BLD AUTO: 0.2 K/UL (ref 0–0.5)
EOSINOPHIL NFR BLD: 2.4 % (ref 0–8)
ERYTHROCYTE [DISTWIDTH] IN BLOOD BY AUTOMATED COUNT: 15.9 % (ref 11.5–14.5)
EST. GFR  (AFRICAN AMERICAN): >60 ML/MIN/1.73 M^2
EST. GFR  (NON AFRICAN AMERICAN): >60 ML/MIN/1.73 M^2
GLUCOSE SERPL-MCNC: 103 MG/DL (ref 70–110)
HCT VFR BLD AUTO: 31.3 % (ref 40–54)
HGB BLD-MCNC: 9.5 G/DL (ref 14–18)
IMM GRANULOCYTES # BLD AUTO: 0.03 K/UL (ref 0–0.04)
IMM GRANULOCYTES NFR BLD AUTO: 0.4 % (ref 0–0.5)
LYMPHOCYTES # BLD AUTO: 0.7 K/UL (ref 1–4.8)
LYMPHOCYTES NFR BLD: 10.3 % (ref 18–48)
MAGNESIUM SERPL-MCNC: 2.3 MG/DL (ref 1.6–2.6)
MCH RBC QN AUTO: 29.1 PG (ref 27–31)
MCHC RBC AUTO-ENTMCNC: 30.4 G/DL (ref 32–36)
MCV RBC AUTO: 96 FL (ref 82–98)
MONOCYTES # BLD AUTO: 0.8 K/UL (ref 0.3–1)
MONOCYTES NFR BLD: 10.7 % (ref 4–15)
NEUTROPHILS # BLD AUTO: 5.4 K/UL (ref 1.8–7.7)
NEUTROPHILS NFR BLD: 75.8 % (ref 38–73)
NRBC BLD-RTO: 0 /100 WBC
PHOSPHATE SERPL-MCNC: 4.3 MG/DL (ref 2.7–4.5)
PLATELET # BLD AUTO: 225 K/UL (ref 150–450)
PMV BLD AUTO: 9.7 FL (ref 9.2–12.9)
POTASSIUM SERPL-SCNC: 5 MMOL/L (ref 3.5–5.1)
PROT SERPL-MCNC: 6.6 G/DL (ref 6–8.4)
RBC # BLD AUTO: 3.26 M/UL (ref 4.6–6.2)
SODIUM SERPL-SCNC: 139 MMOL/L (ref 136–145)
WBC # BLD AUTO: 7.18 K/UL (ref 3.9–12.7)

## 2022-05-11 PROCEDURE — 94761 N-INVAS EAR/PLS OXIMETRY MLT: CPT

## 2022-05-11 PROCEDURE — 20600001 HC STEP DOWN PRIVATE ROOM

## 2022-05-11 PROCEDURE — 84100 ASSAY OF PHOSPHORUS: CPT | Performed by: HOSPITALIST

## 2022-05-11 PROCEDURE — 92507 TX SP LANG VOICE COMM INDIV: CPT

## 2022-05-11 PROCEDURE — 25000242 PHARM REV CODE 250 ALT 637 W/ HCPCS: Performed by: STUDENT IN AN ORGANIZED HEALTH CARE EDUCATION/TRAINING PROGRAM

## 2022-05-11 PROCEDURE — 97530 THERAPEUTIC ACTIVITIES: CPT | Mod: CO

## 2022-05-11 PROCEDURE — 99900026 HC AIRWAY MAINTENANCE (STAT)

## 2022-05-11 PROCEDURE — 97535 SELF CARE MNGMENT TRAINING: CPT

## 2022-05-11 PROCEDURE — 25000242 PHARM REV CODE 250 ALT 637 W/ HCPCS: Performed by: INTERNAL MEDICINE

## 2022-05-11 PROCEDURE — 94640 AIRWAY INHALATION TREATMENT: CPT

## 2022-05-11 PROCEDURE — 94668 MNPJ CHEST WALL SBSQ: CPT

## 2022-05-11 PROCEDURE — 99900035 HC TECH TIME PER 15 MIN (STAT)

## 2022-05-11 PROCEDURE — 99222 PR INITIAL HOSPITAL CARE,LEVL II: ICD-10-PCS | Mod: ,,, | Performed by: NURSE PRACTITIONER

## 2022-05-11 PROCEDURE — 99222 1ST HOSP IP/OBS MODERATE 55: CPT | Mod: ,,, | Performed by: NURSE PRACTITIONER

## 2022-05-11 PROCEDURE — 99232 PR SUBSEQUENT HOSPITAL CARE,LEVL II: ICD-10-PCS | Mod: ,,, | Performed by: HOSPITALIST

## 2022-05-11 PROCEDURE — 63600175 PHARM REV CODE 636 W HCPCS: Performed by: INTERNAL MEDICINE

## 2022-05-11 PROCEDURE — 85025 COMPLETE CBC W/AUTO DIFF WBC: CPT | Performed by: HOSPITALIST

## 2022-05-11 PROCEDURE — 80053 COMPREHEN METABOLIC PANEL: CPT | Performed by: HOSPITALIST

## 2022-05-11 PROCEDURE — 27000221 HC OXYGEN, UP TO 24 HOURS

## 2022-05-11 PROCEDURE — 25000003 PHARM REV CODE 250: Performed by: HOSPITALIST

## 2022-05-11 PROCEDURE — 99232 SBSQ HOSP IP/OBS MODERATE 35: CPT | Mod: ,,, | Performed by: HOSPITALIST

## 2022-05-11 PROCEDURE — 25000003 PHARM REV CODE 250: Performed by: STUDENT IN AN ORGANIZED HEALTH CARE EDUCATION/TRAINING PROGRAM

## 2022-05-11 PROCEDURE — 83735 ASSAY OF MAGNESIUM: CPT | Performed by: HOSPITALIST

## 2022-05-11 RX ADMIN — IPRATROPIUM BROMIDE AND ALBUTEROL SULFATE 3 ML: 2.5; .5 SOLUTION RESPIRATORY (INHALATION) at 04:05

## 2022-05-11 RX ADMIN — ATORVASTATIN CALCIUM 20 MG: 20 TABLET, FILM COATED ORAL at 09:05

## 2022-05-11 RX ADMIN — THIAMINE HCL TAB 100 MG 100 MG: 100 TAB at 09:05

## 2022-05-11 RX ADMIN — OXYCODONE HYDROCHLORIDE 5 MG: 5 SOLUTION ORAL at 08:05

## 2022-05-11 RX ADMIN — GUAIFENESIN 200 MG: 100 SOLUTION ORAL at 09:05

## 2022-05-11 RX ADMIN — IPRATROPIUM BROMIDE AND ALBUTEROL SULFATE 3 ML: 2.5; .5 SOLUTION RESPIRATORY (INHALATION) at 07:05

## 2022-05-11 RX ADMIN — FOLIC ACID 1 MG: 1 TABLET ORAL at 09:05

## 2022-05-11 RX ADMIN — ASPIRIN 81 MG CHEWABLE TABLET 81 MG: 81 TABLET CHEWABLE at 09:05

## 2022-05-11 RX ADMIN — GUAIFENESIN 200 MG: 100 SOLUTION ORAL at 01:05

## 2022-05-11 RX ADMIN — CLOPIDOGREL 75 MG: 75 TABLET, FILM COATED ORAL at 09:05

## 2022-05-11 RX ADMIN — ENOXAPARIN SODIUM 40 MG: 40 INJECTION SUBCUTANEOUS at 04:05

## 2022-05-11 RX ADMIN — IPRATROPIUM BROMIDE AND ALBUTEROL SULFATE 3 ML: 2.5; .5 SOLUTION RESPIRATORY (INHALATION) at 12:05

## 2022-05-11 RX ADMIN — IPRATROPIUM BROMIDE AND ALBUTEROL SULFATE 3 ML: 2.5; .5 SOLUTION RESPIRATORY (INHALATION) at 08:05

## 2022-05-11 RX ADMIN — MELATONIN TAB 3 MG 6 MG: 3 TAB at 09:05

## 2022-05-11 RX ADMIN — IPRATROPIUM BROMIDE AND ALBUTEROL SULFATE 3 ML: 2.5; .5 SOLUTION RESPIRATORY (INHALATION) at 11:05

## 2022-05-11 NOTE — PLAN OF CARE
Problem: SLP  Goal: SLP Goal  Description: Speech Language Pathology Goals  Goals expected to be met by 5/17:    1. Patient will participate in ongoing swallow assessment.   2. Pt will tolerate PMSV with O2 >93% and VSS to improve respiration and phonation.   3. Pt will demonstrate placement/removal of PMSV x5 indep.   4. Pt will vocalize x3 with PMSV in place  5. Pt will recall instructions/precautions of PMSV with 100% acc indep.            Outcome: Ongoing, Progressing   Continue PMSV trials. Pt tolerating well.   5/11/2022

## 2022-05-11 NOTE — CONSULTS
[facility] - Telemetry Stepdown  Physical Medicine & Rehab  Consult Note    Patient Name: Fareed Richard Jr.  MRN: [id number]  Admission Date: 4/19/2022  Hospital Length of Stay: 22 days  Attending Physician: Jordan Armenta MD  Consults  Subjective:     Principal Problem: Acute on chronic respiratory failure    HPI:   Mr. 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 Per wife he has had several days of increasing lethargy, increased work of breathing and secretions. Yesterday she became particularly concerned when he became slightly less responsive and interactive. Wife also notes increased yellow thick secretions from trach site. Upon EMS arrival he was on his home 5 L by trach collar and saturating 93% with significant wheezing. PT IS s/p chemoradiation treatment. Completed on 04/23. On 05/10 pt was able to tolerate PMSV. Pt remains NPO with bolus feeding per G tube. PM & R was consulted to evaluate pt for rehabilitation.       Functional History: Patient lives  with spouse in a single story   home with 5 steps to enter.  Prior to admission, pt was Independent w/o AD.  DME: cane, straight, walker, rolling, wheelchair, rollator, shower chair, hospital bed.       Hospital Course: PT- 05/10    Functional Mobility:  · Bed Mobility: supervision for supine>sit with HOB elevated ~30 degrees   · Transfers: CGA for sit<>stand from EOB and recliner with use of RW, cueing for hand placement and technique  · Gait: Patient ambulated on 2 short ambulation attempts, first ~5 ft to chair and then ~15 ft within room with RW and CGA on 6L      OT- 05/10    Bed Mobility:    · Patient completed Supine to Sit with supervision     Functional Mobility/Transfers:  · Patient completed Sit <> Stand Transfer with contact guard assistance  with  rolling walker   · Patient  completed Bed <> Chair Transfer using Stand Pivot technique with contact guard assistance with rolling walker  · Functional Mobility: Pt. Ambulated ~ 15 feet with RW and CGA     Activities of Daily Living:  · Upper Body Dressing: minimum assistance to don gown like robe  · Lower Body Dressing: stand by assistance to don socks in long sit EOB  · Toileting: contact guard assistance in stand to wipe buttocks region      Past Medical History:   Diagnosis Date    Cancer     skin to Rt forearm and face    COPD (chronic obstructive pulmonary disease)     Hyperlipidemia     Hypertension     Pseudoaneurysm      Past Surgical History:   Procedure Laterality Date    ABDOMINAL SURGERY      stents placed in liver and large intestines, per patient    CAROTID STENT      CORONARY STENT PLACEMENT  01/2000    DISSECTION OF NECK Bilateral 1/4/2022    Procedure: DISSECTION, NECK;  Surgeon: Naeem Smalls MD;  Location: [facility] OR [address] [place] [address];  Service: ENT;  Laterality: Bilateral;    ESOPHAGOGASTRODUODENOSCOPY W/ PEG N/A 1/4/2022    Procedure: EGD, WITH PEG TUBE INSERTION;  Surgeon: Anthony Calabrese MD;  Location: [address];  Service: General;  Laterality: N/A;    EYE SURGERY      Cataract bilateral    femoral stents      bilateral    FLAP PROCEDURE N/A 1/3/2022    Procedure: CREATION, FREE FLAP;  Surgeon: Naeem Smalls MD;  Location: [facility] OR [address] [place] [address];  Service: ENT;  Laterality: N/A;    FLAP PROCEDURE Right 1/4/2022    Procedure: CREATION, FREE FLAP;  Surgeon: Stacey Conde MD;  Location: [facility] OR [address] [place] [address];  Service: ENT;  Laterality: Right;  Ischemic start 1203  Ischemic stop 1353    GLOSSECTOMY N/A 1/4/2022    Procedure: GLOSSECTOMY;  Surgeon: Naeem Smalls MD;  Location: [facility] OR [address] [place] [address];  Service: ENT;  Laterality: N/A;    RADICAL NECK DISSECTION Left 1/3/2022    Procedure: DISSECTION, NECK, RADICAL;  Surgeon: Naeem Smalls MD;  Location: [facility] OR [address] [place] [address];  Service: ENT;  Laterality: Left;     SKIN CANCER EXCISION      TRACHEOTOMY N/A 2022    Procedure: TRACHEOTOMY;  Surgeon: Naeem Smalls MD;  Location: [facility] OR [address] [place] [address];  Service: ENT;  Laterality: N/A;     Review of patient's allergies indicates:  No Known Allergies    Scheduled Medications:    albuterol-ipratropium  3 mL Nebulization Q4H    aspirin  81 mg Per G Tube Daily    atorvastatin  20 mg Per G Tube Daily    clopidogreL  75 mg Per G Tube Daily    enoxaparin  40 mg Subcutaneous Daily    folic acid  1 mg Per G Tube Daily    guaiFENesin 100 mg/5 ml  200 mg Per G Tube Q8H    melatonin  6 mg Per G Tube Nightly    polyethylene glycol  17 g Per G Tube BID    scopolamine  1 patch Transdermal Q3 Days    thiamine  100 mg Per G Tube Daily       PRN Medications: sodium chloride, albuterol-ipratropium, bisacodyL, glycopyrrolate, ondansetron, oxyCODONE, sodium chloride, sodium chloride 0.9%    Family History    None       Tobacco Use    Smoking status: Former Smoker     Packs/day: 2.00     Years: 40.00     Pack years: 80.00     Types: Cigarettes     Start date: 1963     Quit date: 2018     Years since quittin.0    Smokeless tobacco: Never Used    Tobacco comment: 3/3 ppd x 40 yrs. Currently 3-4 cigarettes daily .He is trying  to quit. Is using a Vapor cigarettes  2-3 x's a day.   Substance and Sexual Activity    Alcohol use: Yes     Alcohol/week: 3.0 standard drinks     Types: 3 Cans of beer per week     Comment: beer daily 3-4    Drug use: No    Sexual activity: Not Currently     Review of Systems   Constitutional:  Positive for activity change.   HENT: Negative.     Eyes: Negative.    Respiratory:  Positive for shortness of breath.    Cardiovascular: Negative.    Gastrointestinal: Negative.    Endocrine: Negative.    Genitourinary: Negative.    Musculoskeletal:  Positive for gait problem.   Neurological:  Positive for weakness.   Psychiatric/Behavioral: Negative.     Objective:     Vital Signs (Most  Recent):  Temp: 97.8 °F (36.6 °C) (05/11/22 1132)  Pulse: 94 (05/11/22 1132)  Resp: 20 (05/11/22 1132)  BP: 121/67 (05/11/22 1132)  SpO2: 97 % (05/11/22 1132)    Vital Signs (24h Range):  Temp:  [96.3 °F (35.7 °C)-98.9 °F (37.2 °C)] 97.8 °F (36.6 °C)  Pulse:  [] 94  Resp:  [18-31] 20  SpO2:  [90 %-97 %] 97 %  BP: (121-147)/(66-90) 121/67     Body mass index is 20.92 kg/m².    Physical Exam  HENT:      Head: Normocephalic and atraumatic.   Eyes:      Extraocular Movements: Extraocular movements intact.      Pupils: Pupils are equal, round, and reactive to light.   Pulmonary:      Effort: Pulmonary effort is normal.   Skin:     General: Skin is warm and dry.      Capillary Refill: Capillary refill takes 2 to 3 seconds.      Coloration: Skin is pale.   Neurological:      Mental Status: He is alert and oriented to person, place, and time.      GCS: GCS eye subscore is 4. GCS verbal subscore is 5. GCS motor subscore is 6.      Motor: Motor function is intact. No weakness.      Gait: Gait abnormal.   Psychiatric:         Attention and Perception: Attention normal.         Mood and Affect: Mood and affect normal.         Behavior: Behavior normal. Behavior is cooperative.     NEUROLOGICAL EXAMINATION:     MENTAL STATUS   Oriented to person, place, and time.     CRANIAL NERVES     CN III, IV, VI   Pupils are equal, round, and reactive to light.    Diagnostic Results: Labs: Reviewed    Assessment/Plan:     * Acute on chronic respiratory failure  -S/P trach. At home on 5L o2.  -S/P glossectomy due to squamous cell carcinoma.  -S/P Chemo radiation. Completed on 04/23  -PSMV trials since 05/10.  -NPO with bolus feeding per G tube.     Impaired mobility and ADLs  See hospital course for functional status.    Recommendations  -  Encourage mobility, OOB in chair, and early ambulation as appropriate  -  PT/OT evaluate and treat  -  Pain management  -  DVT prophylaxis if appropriate   -  Monitor for and prevent skin breakdown  and pressure ulcers  · Early mobility, repositioning/weight shifting every 20-30 minutes when sitting, turn patient every 2 hours, proper mattress/overlay and chair cushioning, pressure relief/heel protector boots      PM&R Recommendation:     At this time, the PM&R team has reviewed this patient's ongoing medical case including inpatient diagnosis, medical history, clinical examination, labs, vitals, current social and functional history to provide the post-acute recommendation as follows:     RECOMMENDATIONS: inpatient rehabilitation due to fair to good potential for improvement with therapies and need of physician oversight for management of ongoing active medical issues.      We will sign off.     Thank you for your consult.     Nery Adams NP  Department of Physical Medicine & Rehab  [facility] - Telemetry Stepdown

## 2022-05-11 NOTE — RESPIRATORY THERAPY
RAPID RESPONSE RESPIRATORY THERAPY PROACTIVE NOTE           Time of visit: 943     Code Status: Full Code   : 1950  Bed: 8094/8094 A:   MRN: [id number]  Time spent at the bedside: < 15 min    SITUATION    Evaluated patient for: LDA Check     BACKGROUND    Patient has a past medical history of Cancer, COPD (chronic obstructive pulmonary disease), Hyperlipidemia, Hypertension, and Pseudoaneurysm.  Clinically Significant Surgical Hx: tracheostomy    24 Hours Vitals Range:  Temp:  [96.3 °F (35.7 °C)-98.9 °F (37.2 °C)]   Pulse:  []   Resp:  [18-31]   BP: (107-147)/(66-90)   SpO2:  [90 %-97 %]     Labs:    Recent Labs     22  0328 05/10/22  0337 05/10/22  1031 22  0412    140 140 139   K 4.6 5.2* 4.6 5.0    102 100 100   CO2 30* 30* 33* 30*   CREATININE 0.7 0.7 0.8 0.7    90 130* 103   PHOS 4.4 4.8* 4.4 4.3   MG 2.1 2.2  --  2.3        No results for input(s): PH, PCO2, PO2, HCO3, POCSATURATED, BE in the last 72 hours.    ASSESSMENT/INTERVENTIONS    Upon arrival in room patient sitting up in bed wearing speaking valve. Patient has all trach supplies bedside along with extra trachs sizes 4 and 6. Obturator HOB.    Last VS   Temp: 98.9 °F (37.2 °C) (827)  Pulse: 100 (852)  Resp: 25 (852)  BP: 136/75 (827)  SpO2: 95 % (852)    Level of Consciousness: Level of Consciousness (AVPU): alert  Respiratory Effort: Respiratory Effort: Unlabored Expansion/Accessory Muscle Usage: Expansion/Accessory Muscles/Retractions: expansion symmetric  All Lung Field Breath Sounds: All Lung Fields Breath Sounds: diminished  DEE Breath Sounds: coarse  LLL Breath Sounds: coarse  RUL Breath Sounds: diminished  RML Breath Sounds: diminished  RLL Breath Sounds: diminished  O2 Device/Concentration: Flow (L/min): 5, Oxygen Concentration (%): 28, O2 Device (Oxygen Therapy): Trach Collar  Surgical airway: Yes, Type: Shiley Size: 6, cuffed  Ambu at bedside: Ambu bag with the  patient?: Yes, Adult Ambu     Active Orders   Respiratory Care    Chest physiotherapy TID     Frequency: TID     Number of Occurrences: Until Specified     Order Questions:      Indications: COPIOUS SPUTUM PRODUCTION      Indications: ATELECTASIS PROPHYLAXIS      Indications: ATELECTASIS NONRESPONSIVE TO TX    Inhalation Treatment Q4H     Frequency: Q4H     Number of Occurrences: Until Specified    Oxygen Continuous     Frequency: Continuous     Number of Occurrences: Until Specified     Order Questions:      Device type: Low flow      Device: Trach Collar (Comment: 8L)      FiO2%: 35%      Titrate O2 per Oxygen Titration Protocol: Yes      To maintain SpO2 goal of: >= 90%      Notify MD of: Inability to achieve desired SpO2; Sudden change in patient status and requires 20% increase in FiO2; Patient requires >60% FiO2    Pulse Oximetry Continuous     Frequency: Continuous     Number of Occurrences: Until Specified    Routine tracheostomy care     Frequency: BID     Number of Occurrences: Until Specified       RECOMMENDATIONS    We recommend: RRT Recs: Continue POC per primary team.            FOLLOW-UP    Please call back the Rapid Response RT, Christel Preciado, RRT at x [phone] for any questions or concerns.

## 2022-05-11 NOTE — PLAN OF CARE
Problem: Adult Inpatient Plan of Care  Goal: Plan of Care Review  Outcome: Ongoing, Progressing  Goal: Patient-Specific Goal (Individualized)  Outcome: Ongoing, Progressing  Goal: Absence of Hospital-Acquired Illness or Injury  Outcome: Ongoing, Progressing  Goal: Optimal Comfort and Wellbeing  Outcome: Ongoing, Progressing  Goal: Readiness for Transition of Care  Outcome: Ongoing, Progressing     Problem: Impaired Wound Healing  Goal: Optimal Wound Healing  Outcome: Ongoing, Progressing     Problem: Skin Injury Risk Increased  Goal: Skin Health and Integrity  Outcome: Ongoing, Progressing     Problem: Communication Impairment (Artificial Airway)  Goal: Effective Communication  Outcome: Ongoing, Progressing     Problem: Device-Related Complication Risk (Artificial Airway)  Goal: Optimal Device Function  Outcome: Ongoing, Progressing     Problem: Noninvasive Ventilation Acute  Goal: Effective Unassisted Ventilation and Oxygenation  Outcome: Ongoing, Progressing     Problem: Fall Injury Risk  Goal: Absence of Fall and Fall-Related Injury  Outcome: Ongoing, Progressing

## 2022-05-11 NOTE — PROGRESS NOTES
[facility] - Telemetry Stepdown  Adult Nutrition  Consult Note    SUMMARY     Recommendations    1. Continue current TF regimen of Isosource 1.5 (bolus) 6 cartons/day - meeting needs.  2. RD to monitor & follow-up.    Goals: Meet % EEN, EPN by RD f/u date  Nutrition Goal Status: goal met  Communication of RD Recs: reviewed with RN    Assessment and Plan    Protein-calorie malnutrition    Nutrition Problem:  Severe Protein-Calorie Malnutrition  Malnutrition in the context of Chronic Illness/Injury    Related to (etiology):  Inability to consume sufficient energy    Signs and Symptoms (as evidenced by):  Body Fat Depletion: severe depletion of orbitals   Muscle Mass Depletion: severe depletion of temples, clavicle region and lower extremities   Weight Loss: 15% x 6 months    Interventions(treatment strategy):  Collaboration of nutrition care w/ other providers  Enteral nutrition     Nutrition Diagnosis Status:  Continues    Malnutrition Assessment    Weight Loss (Malnutrition): greater than 10% in 6 months   Orbital Region (Subcutaneous Fat Loss): severe depletion   [place] Region (Muscle Loss): severe depletion  Clavicle Bone Region (Muscle Loss): severe depletion  Dorsal Hand (Muscle Loss): severe depletion  Anterior Thigh Region (Muscle Loss): severe depletion     Reason for Assessment    Reason For Assessment: RD follow-up  Diagnosis: other (see comments) (Resp. fx)  Relevant Medical History: SCC of tongue, total glossectomy, trach, PEG  Interdisciplinary Rounds: attended    General Information Comments: Remains NPO, per SLP. Pt tolerating bolus TF regimen. Noted vomiting episode yesterday. UBW: 160#; chart review confirms. NFPE complete 4/20 - pt w/ severe fat & muscle wasting. RD feels pt meets criteria for severe malnutrition. Please see PES statement for details.  Nutrition Discharge Planning: Pending clinical course    Nutrition/Diet History    Patient Reported Diet/Restrictions/Preferences: no oral  "intake  Spiritual, Cultural Beliefs, Sabianist Practices, Values that Affect Care: no  Factors Affecting Nutritional Intake: NPO  Nutrition Support Formula Prior to Admit: Isosource 1.5  Nutrition Support Rate Prior to Admit: 6 cans/day (bolus)    Anthropometrics    Temp: 97.8 °F (36.6 °C)  Height: 5' 8" (172.7 cm)  Height (inches): 68 in  Weight Method: Bed Scale  Weight: 62.4 kg (137 lb 9.1 oz)  Weight (lb): 137.57 lb  Ideal Body Weight (IBW), Male: 154 lb  % Ideal Body Weight, Male (lb): 89.33 %  BMI (Calculated): 20.9  BMI Grade: 18.5-24.9 - normal  Usual Body Weight (UBW), k.7 kg  % Usual Body Weight: 85.32  % Weight Change From Usual Weight: -14.86 %    Lab/Procedures/Meds    Pertinent Labs Reviewed: reviewed  Pertinent Labs Comments: -  Pertinent Medications Reviewed: reviewed  Pertinent Medications Comments: -    Estimated/Assessed Needs    Weight Used For Calorie Calculations: 62.4 kg (137 lb 9.1 oz)     Energy Calorie Requirements (kcal): 3099-1821 kcal/d  Energy Need Method: Kcal/kg (30-35 kcal/kg)     Protein Requirements: 94 g/d (1.5 g/kg)  Weight Used For Protein Calculations: 62.4 kg (137 lb 9.1 oz)     Estimated Fluid Requirement Method: other (see comments) (Per MD or 1 mL/kcal)  RDA Method (mL): 1872    Nutrition Prescription Ordered    Current Diet Order: NPO  Current Nutrition Support Formula Ordered: Isosource 1.5  Current Nutrition Support Rate Ordered: 6 cartons/day    Evaluation of Received Nutrient/Fluid Intake    Enteral Calories (kcal): 2250  Enteral Protein (gm): 102  Enteral (Free Water) Fluid (mL): 1146    % Kcal Needs: 103%  % Protein Needs: 109%    I/O: +5.6L since     Energy Calories Required: meeting needs  Protein Required: meeting needs  Fluid Required: other (see comments) (Per MD)    Comments: LBM: 5/10    Tolerance: tolerating    Nutrition Risk    Level of Risk/Frequency of Follow-up:  (1x/week)     Monitor and Evaluation    Food and Nutrient Intake: enteral nutrition " intake  Food and Nutrient Adminstration: enteral and parenteral nutrition administration  Physical Activity and Function: nutrition-related ADLs and IADLs  Anthropometric Measurements: weight, weight change  Biochemical Data, Medical Tests and Procedures: glucose/endocrine profile, lipid profile, inflammatory profile, gastrointestinal profile, electrolyte and renal panel  Nutrition-Focused Physical Findings: overall appearance     Nutrition Follow-Up    RD Follow-up?: Yes

## 2022-05-11 NOTE — SUBJECTIVE & OBJECTIVE
Past Medical History:   Diagnosis Date    Cancer     skin to Rt forearm and face    COPD (chronic obstructive pulmonary disease)     Hyperlipidemia     Hypertension     Pseudoaneurysm      Past Surgical History:   Procedure Laterality Date    ABDOMINAL SURGERY      stents placed in liver and large intestines, per patient    CAROTID STENT      CORONARY STENT PLACEMENT  01/2000    DISSECTION OF NECK Bilateral 1/4/2022    Procedure: DISSECTION, NECK;  Surgeon: Naeem Smalls MD;  Location: [facility] OR [address] [place] [address];  Service: ENT;  Laterality: Bilateral;    ESOPHAGOGASTRODUODENOSCOPY W/ PEG N/A 1/4/2022    Procedure: EGD, WITH PEG TUBE INSERTION;  Surgeon: Anthony Calabrese MD;  Location: [facility] OR [address] [place] [address];  Service: General;  Laterality: N/A;    EYE SURGERY      Cataract bilateral    femoral stents      bilateral    FLAP PROCEDURE N/A 1/3/2022    Procedure: CREATION, FREE FLAP;  Surgeon: Naeem Smalls MD;  Location: [facility] OR [address] [place] [address];  Service: ENT;  Laterality: N/A;    FLAP PROCEDURE Right 1/4/2022    Procedure: CREATION, FREE FLAP;  Surgeon: Stacey Conde MD;  [facility];  Service: ENT;  Laterality: Right;  Ischemic start 1203  Ischemic stop 1353    GLOSSECTOMY N/A 1/4/2022    Procedure: GLOSSECTOMY;  Surgeon: Naeem Samlls MD;  Location: [facility] OR [address] [place] [address];  Service: ENT;  Laterality: N/A;    RADICAL NECK DISSECTION Left 1/3/2022    Procedure: DISSECTION, NECK, RADICAL;  Surgeon: Naeem Smalls MD;  Location: [facility] OR [address] [place] [address];  Service: ENT;  Laterality: Left;    SKIN CANCER EXCISION      TRACHEOTOMY N/A 1/4/2022    Procedure: TRACHEOTOMY;  Surgeon: Naeem Smalls MD;  Location: [facility] OR [address] [place] [address];  Service: ENT;  Laterality: N/A;     Review of patient's allergies indicates:  No Known Allergies    Scheduled Medications:    albuterol-ipratropium  3 mL Nebulization Q4H    aspirin  81 mg Per G Tube Daily    atorvastatin  20 mg Per G Tube Daily    clopidogreL  75 mg Per G Tube Daily     enoxaparin  40 mg Subcutaneous Daily    folic acid  1 mg Per G Tube Daily    guaiFENesin 100 mg/5 ml  200 mg Per G Tube Q8H    melatonin  6 mg Per G Tube Nightly    polyethylene glycol  17 g Per G Tube BID    scopolamine  1 patch Transdermal Q3 Days    thiamine  100 mg Per G Tube Daily       PRN Medications: sodium chloride, albuterol-ipratropium, bisacodyL, glycopyrrolate, ondansetron, oxyCODONE, sodium chloride, sodium chloride 0.9%    Family History    None       Tobacco Use    Smoking status: Former Smoker     Packs/day: 2.00     Years: 40.00     Pack years: 80.00     Types: Cigarettes     Start date: 1963     Quit date: 2018     Years since quittin.0    Smokeless tobacco: Never Used    Tobacco comment: 3/3 ppd x 40 yrs. Currently 3-4 cigarettes daily .He is trying  to quit. Is using a Vapor cigarettes  2-3 x's a day.   Substance and Sexual Activity    Alcohol use: Yes     Alcohol/week: 3.0 standard drinks     Types: 3 Cans of beer per week     Comment: beer daily 3-4    Drug use: No    Sexual activity: Not Currently     Review of Systems   Constitutional:  Positive for activity change.   HENT: Negative.     Eyes: Negative.    Respiratory:  Positive for shortness of breath.    Cardiovascular: Negative.    Gastrointestinal: Negative.    Endocrine: Negative.    Genitourinary: Negative.    Musculoskeletal:  Positive for gait problem.   Neurological:  Positive for weakness.   Psychiatric/Behavioral: Negative.     Objective:     Vital Signs (Most Recent):  Temp: 97.8 °F (36.6 °C) (22 1132)  Pulse: 94 (22 1132)  Resp: 20 (22 1132)  BP: 121/67 (22 1132)  SpO2: 97 % (22 1132)    Vital Signs (24h Range):  Temp:  [96.3 °F (35.7 °C)-98.9 °F (37.2 °C)] 97.8 °F (36.6 °C)  Pulse:  [] 94  Resp:  [18-31] 20  SpO2:  [90 %-97 %] 97 %  BP: (121-147)/(66-90) 121/67     Body mass index is 20.92 kg/m².    Physical Exam  HENT:      Head: Normocephalic and atraumatic.   Eyes:       Extraocular Movements: Extraocular movements intact.      Pupils: Pupils are equal, round, and reactive to light.   Pulmonary:      Effort: Pulmonary effort is normal.   Skin:     General: Skin is warm and dry.      Capillary Refill: Capillary refill takes 2 to 3 seconds.      Coloration: Skin is pale.   Neurological:      Mental Status: He is alert and oriented to person, place, and time.      GCS: GCS eye subscore is 4. GCS verbal subscore is 5. GCS motor subscore is 6.      Motor: Motor function is intact. No weakness.      Gait: Gait abnormal.   Psychiatric:         Attention and Perception: Attention normal.         Mood and Affect: Mood and affect normal.         Behavior: Behavior normal. Behavior is cooperative.     NEUROLOGICAL EXAMINATION:     MENTAL STATUS   Oriented to person, place, and time.     CRANIAL NERVES     CN III, IV, VI   Pupils are equal, round, and reactive to light.    Diagnostic Results: Labs: Reviewed

## 2022-05-11 NOTE — PLAN OF CARE
SW in communication with pt and pt's spouse regarding change in recs from SNF to Rehab.  [facility] [place] [facility].  SW sent referral and clinicals.     8:49 AM  Inpt Rehab LOC review sent to N.  Awaiting a response.         05/11/22 0807   Post-Acute Status   Post-Acute Authorization Placement   Post-Acute Placement Status Referrals Sent   Discharge Delays None known at this time   Discharge Plan   Discharge Plan A Rehab   Discharge Plan B Home;Home with family;Home Health     Milady Thomson LMSW  PRN-  [facility]  Ext. [phone]

## 2022-05-11 NOTE — ASSESSMENT & PLAN NOTE
-S/P trach. At home on 5L o2.  -S/P glossectomy due to squamous cell carcinoma.  -S/P Chemo radiation. Completed on 04/23  -PSMV trials since 05/10.  -NPO with bolus feeding per G tube.

## 2022-05-11 NOTE — PLAN OF CARE
Problem: Occupational Therapy  Goal: Occupational Therapy Goal  Description: Goals to be met by: 05-25-22     Patient will increase functional independence with ADLs by performing:    UE Dressing with Set-up Assistance.  LE Dressing with Supervision  Grooming while standing at sink with Supervision.  Toileting from toilet with Supervision for hygiene and clothing management.   Supine to sit with [place].  Stand pivot transfers with Supervision with RW  Toilet transfer to toilet with Supervision.  Pt. To be I with HEP to BUE to assist with improving overall level of endurance    Outcome: Ongoing, Progressing

## 2022-05-11 NOTE — HOSPITAL COURSE
PT- 05/10    Functional Mobility:  Bed Mobility: supervision for supine>sit with HOB elevated ~30 degrees   Transfers: CGA for sit<>stand from EOB and recliner with use of RW, cueing for hand placement and technique  Gait: Patient ambulated on 2 short ambulation attempts, first ~5 ft to chair and then ~15 ft within room with RW and CGA on 6L      OT- 05/10    Bed Mobility:    Patient completed Supine to Sit with supervision     Functional Mobility/Transfers:  Patient completed Sit <> Stand Transfer with contact guard assistance  with  rolling walker   Patient completed Bed <> Chair Transfer using Stand Pivot technique with contact guard assistance with rolling walker  Functional Mobility: Pt. Ambulated ~ 15 feet with RW and CGA     Activities of Daily Living:  Upper Body Dressing: minimum assistance to don gown like robe  Lower Body Dressing: stand by assistance to don socks in long sit EOB  Toileting: contact guard assistance in stand to wipe buttocks region

## 2022-05-11 NOTE — PT/OT/SLP PROGRESS
"Occupational Therapy   Treatment    Name: Fareed Richard Jr.  MRN: [id number]  Admitting Diagnosis:  Acute on chronic respiratory failure       Recommendations:     Discharge Recommendations: rehabilitation facility  Discharge Equipment Recommendations:  none  Barriers to discharge:    Inaccessible home environment (requires increased assist)    Assessment:     Fareed Richard Jr. is a 72 y.o. male with a medical diagnosis of Acute on chronic respiratory failure.  He presents with  Performance deficits affecting function are weakness, impaired endurance, impaired self care skills, impaired functional mobility, gait instability, impaired cardiopulmonary response to activity    Pt.  participated well with session on this day. Pt. On 5L of 02 Pt. Hr varying around 104 on this day with task and 02 at  90% with sit to stand t/fs on this day.  Pt  continues to demonstrate levels of physical deficits with  functional indep with daily management activities tasks, selfcare skills with balance,  functional mobility, UB strength and endurance. Pt. Will continue to benefit from continued OT to progress towards goals     Rehab Prognosis:  Fair; patient would benefit from acute skilled OT services to address these deficits and reach maximum level of function.       Plan:     Patient to be seen 3 x/week to address the above listed problems via self-care/home management, therapeutic activities, therapeutic exercises, neuromuscular re-education  · Plan of Care Expires: 06/09/22  · Plan of Care Reviewed with: patient    Subjective   "I am doing well" able to make out small phrases with Pt. With taking  Pain/Comfort:  Pain Rating 1: 0/10  Pain Rating Post-Intervention 1: 0/10    Objective:     Communicated with: aminah  prior to session.  Patient found up in chair with Tracheostomy, oxygen, telemetry, pulse ox (continuous) BLE elevated    upon OT entry to room.    General Precautions: Standard, fall, respiratory   Orthopedic Precautions:N/A "   Braces:    Respiratory Status: 5 ML through Trach collar     Occupational Performance:     Functional Mobility/Transfers:  · Patient completed Sit <> Stand Transfer with minimum assistance  with  rolling walker  Pt.with cues for safety and hand placement with forward/backward steps to and from chair    Activities of Daily Living:  · Grooming: stand by assistance to wipe face and comb hair  · Lower Body Dressing: stand by assistance chair level to renee BLE socks with BLE elevated     AMPAC 6 Click ADL: 20    Treatment & Education:  Pt. AROM 2 x1 0 reps with  shd flex, bicep curls and forward flex motion to increase BUE ROM and strength,selfcare task and fxl mobility for independence     Pt edu on role of OT, POC, safety when performing self care tasks , benefit of performing OOB activity, and safety when performing functional transfers and mobility management for preparation with goals to progress towards next level of care    Patient left up in chair with all lines intact and call button in reachEducation:      GOALS:   Multidisciplinary Problems     Occupational Therapy Goals        Problem: Occupational Therapy    Goal Priority Disciplines Outcome Interventions   Occupational Therapy Goal     OT, PT/OT Ongoing, Progressing    Description: Goals to be met by: 05-25-22     Patient will increase functional independence with ADLs by performing:    UE Dressing with Set-up Assistance.  LE Dressing with Supervision  Grooming while standing at sink with Supervision.  Toileting from toilet with Supervision for hygiene and clothing management.   Supine to sit with [place].  Stand pivot transfers with Supervision with RW  Toilet transfer to toilet with Supervision.  Pt. To be I with HEP to BUE to assist with improving overall level of endurance                     Time Tracking:     OT Date of Treatment: 05/11/22  OT Start Time: 1025  OT Stop Time: 1048  OT Total Time (min): 23 min    Billable Minutes:Therapeutic  Activity 23    OT/NELLY: NELLY        A client care conference was performed between the [facility] and [facility], prior to treatment to discuss the patient's status, treatment plan and established goals.   5/11/2022

## 2022-05-11 NOTE — CONSULTS
Inpatient consult to Physical Medicine Rehab  Consult performed by: Nery Adams NP  Consult ordered by: Jordan Armenta MD      Consult received.  Reviewed patient history and current admission.  PM&R following.  Nery Adams NP  Physical Medicine & Rehabilitation   05/11/2022

## 2022-05-11 NOTE — PLAN OF CARE
Problem: Adult Inpatient Plan of Care  Goal: Plan of Care Review  Outcome: Ongoing, Progressing  Goal: Patient-Specific Goal (Individualized)  Outcome: Ongoing, Progressing  Goal: Absence of Hospital-Acquired Illness or Injury  Outcome: Ongoing, Progressing  Goal: Optimal Comfort and Wellbeing  Outcome: Ongoing, Progressing    Bed available at [facility], insurance approval pending, cont poc, safety measures in place, vss, nad, call bell within reach.

## 2022-05-11 NOTE — PROGRESS NOTES
Pasha Cherry - Telemetry [facility] Medicine  Progress Note    Patient Name: Fareed Richard Jr.  MRN: [id number]  Patient Class: IP- Inpatient   Admission Date: 4/19/2022  Length of Stay: 22 days  Attending Physician: Jordan Armenta MD  Primary Care Provider: Kodi Tubbs MD        Subjective:     Principal Problem:Acute on chronic respiratory failure        HPI:  Mr. 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 Per wife he has had several days of increasing lethargy, increased work of breathing and secretions. Yesterday she became particularly concerned when he became slightly less responsive and interactive. Wife also notes increased yellow thick secretions from trach site. Upon EMS arrival he was on his home 5 L by trach collar and saturating 93% with significant wheezing.  He received 1 DuoNeb by them.  He was also suctioned.     Currently patient is alert, following commands.  He is responding to yes no questions.  He denies any chest pain but does endorse shortness of breath and cough.  He denies any abdominal pain or pain elsewhere       ED course:   He was given 500ccs IVF as well as one time doses of vanc/cefepime/azithro. Labs significant for BNP of 1600, WBC 5k, K 5.6, Bicarb 35, VBG 7.26/87/25/40. Trop wnl. CXR with new cardiomegaly and pulmonary edema. Suctioned with return of copious secretions. ICU was consulted for acute hypoxemic respiratory failure and he will be admitted for further management.              Overview/Hospital Course:    Admitted to ICU, started on CAP coverage and solumedrol. Quickly weaned off vent after return of copious secretions on suction. Has been maintaining sats well on home O2. Rad/onc consulted for completion of radiology while inpatient. Pending culture sensitivity.    4/21 Transfer to hospital  medicine . 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  initially requiring vent but has now been stable on home trach collar for 24 hours. sats 93% on 8L via trach collar.  Awaiting cultures from sputum, on CAP coverage with ceftriaxone. completed Azithromycin. continue solumedrol 40mg IV daily.  rad/onc consulted this morning-he was supposed to complete radiation outpt but was admitted, attempting to set up for inpatient  4/22 changed to bolus GT feedings. s/p radiation oncology eval -on adjuvant chemoradiation, completed 31/33 fractions of RT.  renitiation of RT  inpatient when patient able to tolerate. sats 95% on trach collar 10/ fio2 60% . completed azithromycin.  started on Zosyn for possible aspiration  repeat CX ray-in the inferior hemithorax on the left side consistent with airspace consolidation/volume loss in the left lower lobe is again appreciated, but the lung zones are essentially stable since the prior exam and demonstrate no new areas of airspace consolidation or volume loss. respiratory culture 4/19 with pansensitive  pseudomonas.     4/23 intermittently refusing bolus GT feedings.stable on 10L/60% Fio2 . completed radiation sessions. off solumedrol  today. PT recs SNF  4/24 K and P replaced   4/25 sodium 150. started on D5W . repeat renal panel in PM . oxygen tapered to 8L/ Fio2 35%   4/26 K of 3.2  replaced   4/27 stable on 8L/ Fio2 35% . mucous plug remvove by suction , wants glycopyrrolate PRN due to dry mouth   Interval History:   4/28 - has trach, G tube, NPO, completed radiation treatments, 92% on 8 liters,+ stool and urinations. Weaning oxygen. Wants to go home w HH. Discussed his care and condition with his wife.    4/29 - still requiring 8 liters per NC. Sats low 90s. I lowered oxygen to 5 liters while in the room and he tolerated it.  Keep weaning.   4/30 - back up  to 8 liters per NC, refused some Bolus TFs yesterday  5/1 - not able to wean oxygen.  He may need a LTAC for HF oxygen. Otherwise, progressing,. Sat up in chair yesterday, + urinations and BM.   5/2 - on 8 liters per NC this am. 92%.  His home goal is 5 liters. Pt tends to need more at night.  Plan is to keep him overnight on 5 liters to make sure he is safe for home oxygen.  He may benefit from LTAC a few days. Accepted to LTAC, waiting on ins auth.  Needs PT/OT for deconditioning.  5/3 -  8 liters, up in chair a am, monitor % meals, on nebs q 4 hours. PT/OT, trach care and suctioning. LTAC referrals sent.   5/4 - nurses unable to keep flow rate below 8 overnight, despite coaching. His volume status is stable. Repeat cxr. - Interval development of increased opacification within the right lung base, may reflect developing infiltrate versus atelectasis.  Small bilateral pleural effusions, increased on the left as compared to prior.  5/5  past week not able to wean oxygen.   sats 93 % on 8L NC/ fio2 35%. tapered to 5L/28% needs LTAC for HF oxygen.  repeated a cxray 5/4 - Interval development of increased opacification within the right lung base, may reflect developing infiltrate versus atelectasis. started antibiotics- vanc and zosyn.  Consider pulmonary eval .  just completed  course of zosyn 4/29 5/6 tolerated 6L NC/ fio2 35% overnight . peer to peer with insurane today. EKG with sinus tachycardia   discussed [facility] pulmonology. monitor off antibiotics zosyn and vancomcyn as he completed adequate course of antibiotics for pseudomas and aspiration. CX ray improvement may lag behind treatment  5/7 stable on 5L/ fio2 28% overnight .completed peer to peer with insurance today . awaiting decision   5/9  stable on 5L/ fio2 28%. denied LTAC,. discussed with wife at the bedside. she wants PT/OT and SLP eval - to work with patient consistently . prefers rehab as second option  5/10 able to tolerate PMSV but aspiration with  swallow trials. NPO for  now. K of 5.2 repeat renal panel WNL   PT/OT to work with patient on regular basis. ambulated with PT today. PT recs rehab   5/11 awaiting  [facility] acceptance          Review of Systems:   Pain scale:   Constitutional:  fever,  chills, headache, vision loss, hearing loss, weight loss, Generalized weakness, falls, loss of smell, loss of taste, poor appetite,  sore throat, voice change,immunocompromised state.   Respiratory: cough, shortness of breath.   Cardiovascular: chest pain, dizziness, palpitations, orthopnea, swelling of feet, syncope  Gastrointestinal: nausea, vomiting, abdominal pain, diarrhea, black stool,  blood in stool, change in bowel habits  Genitourinary: hematuria, dysuria, urgency, frequency  Integument/Breast: rash,  pruritis  Hematologic/Lymphatic: easy bruising, lymphadenopathy  Musculoskeletal: arthralgias , myalgias, back pain, neck pain, knee pain  Neurological: confusion, seizures, tremors, slurred speech  Behavioral/Psych:  depression, anxiety, auditory or visual hallucinations        OBJECTIVE:     Physical Exam:  Body mass index is 20.92 kg/m².       Constitutional: Appears  thin built   Head: Normocephalic and atraumatic.   Neck: Normal range of motion. Neck supple. trach collar  Cardiovascular: Normal heart rate.  Regular heart rhythm.  Pulmonary/Chest: Effort normal.   Abdominal: No distension.  No tenderness, PEG tube insitu   Musculoskeletal: Normal range of motion. No edema.   Neurological: Alert and oriented to person, place, and time.   Skin: Skin is warm and dry.   Psychiatric: Normal mood and affect. Behavior is normal.       Vital Signs  Temp: 97.8 °F (36.6 °C) (05/11/22 1132)  Pulse: 92 (05/11/22 1255)  Resp: (Abnormal) 29 (05/11/22 1255)  BP: 121/67 (05/11/22 1132)  SpO2: 97 % (05/11/22 1255)     24 Hour VS Range    Temp:  [96.3 °F (35.7 °C)-98.9 °F (37.2 °C)]   Pulse:  []   Resp:  [18-31]   BP: (121-147)/(66-90)   SpO2:  [90 %-97 %]      Intake/Output Summary (Last 24 hours) at 5/11/2022 1551  Last data filed at 5/11/2022 0827  Gross per 24 hour   Intake 825 ml   Output 650 ml   Net 175 ml         I/O This Shift:  I/O this shift:  In: 450 [NG/GT:450]  Out: -     Wt Readings from Last 3 Encounters:   05/11/22 62.4 kg (137 lb 9.1 oz)   04/06/22 63 kg (138 lb 14.2 oz)   04/04/22 63 kg (139 lb)       I have personally reviewed the vitals and recorded Intake/Output     Laboratory/Diagnostic Data:    CBC/Anemia Labs: Coags:    Recent Labs   Lab 05/09/22  0328 05/10/22  0337 05/11/22  0412   WBC 4.16 4.35 7.18   HGB 9.7* 9.3* 9.5*   HCT 32.1* 30.6* 31.3*    224 225   MCV 96 96 96   RDW 16.1* 15.9* 15.9*    No results for input(s): PT, INR, APTT in the last 168 hours.     Chemistries: ABG:   Recent Labs   Lab 05/09/22  0328 05/10/22  0337 05/10/22  1031 05/11/22  0412    140 140 139   K 4.6 5.2* 4.6 5.0    102 100 100   CO2 30* 30* 33* 30*   BUN 21 22 22 26*   CREATININE 0.7 0.7 0.8 0.7   CALCIUM 9.5 9.3 9.7 9.7   PROT 6.4 5.8*  --  6.6   BILITOT 0.2 0.2  --  0.2   ALKPHOS 77 76  --  77   ALT 10 9*  --  10   AST 13 13  --  14   MG 2.1 2.2  --  2.3   PHOS 4.4 4.8* 4.4 4.3    No results for input(s): PH, PCO2, PO2, HCO3, POCSATURATED, BE in the last 168 hours.     POCT Glucose: HbA1c:    No results for input(s): POCTGLUCOSE in the last 168 hours. No results found for: HGBA1C     Cardiac Enzymes: Ejection Fractions:    No results for input(s): CPK, CPKMB, MB, TROPONINI in the last 72 hours. EF   Date Value Ref Range Status   04/19/2022 60 % Final   12/30/2021 65 % Final          No results for input(s): COLORU, APPEARANCEUA, PHUR, SPECGRAV, PROTEINUA, GLUCUA, KETONESU, BILIRUBINUA, OCCULTUA, NITRITE, UROBILINOGEN, LEUKOCYTESUR, RBCUA, WBCUA, BACTERIA, SQUAMEPITHEL, HYALINECASTS in the last 48 hours.    Invalid input(s): WRIGHTSUR    Procalcitonin (ng/mL)   Date Value   04/19/2022 0.09     Lactate (Lactic Acid) (mmol/L)   Date Value    04/19/2022 1.0     BNP (pg/mL)   Date Value   04/22/2022 132 (H)   04/19/2022 1,607 (H)   03/21/2022 242 (H)   11/02/2019 260 (H)   12/27/2018 239 (H)     No results found for: CRP, SEDRATE  No results found for: DDIMER  Ferritin (ng/mL)   Date Value   02/16/2022 135     No results found for: LDH  Troponin I (ng/mL)   Date Value   04/19/2022 0.018   11/02/2019 0.016   12/27/2018 <0.006     No results found for this or any previous visit.  SARS-CoV2 (COVID-19) Qualitative PCR (no units)   Date Value   05/02/2022 Not Detected   12/31/2021 Not Detected     SARS-CoV-2 RNA, Amplification, Qual (no units)   Date Value   01/13/2022 Negative   01/03/2022 Negative     POC Rapid COVID (no units)   Date Value   04/19/2022 Negative       Microbiology labs for the last week  Microbiology Results (last 7 days)     Procedure Component Value Units Date/Time    Culture, Respiratory with Gram Stain [[id number]]     Order Status: No result Specimen: Respiratory           Reviewed and noted in plan where applicable- Please see chart for full lab data.    Lines/Drains:       Peripheral IV - Single Lumen 04/25/22 2300 22 G Anterior;Distal;Left Upper Arm (Active)   Site Assessment Clean;Dry;Intact 05/05/22 0318   Extremity Assessment Distal to IV No abnormal discoloration 05/04/22 1100   Line Status Saline locked 05/04/22 1100   Dressing Status Clean;Dry;Intact 05/04/22 1100   Dressing Intervention Integrity maintained 05/04/22 1100   Dressing Change Due 04/28/22 04/28/22 0912   Site Change Due 04/26/22 04/28/22 0912   Reason Not Rotated Poor venous access 05/03/22 0735   Number of days: 9            Gastrostomy/Enterostomy 01/04/22 Percutaneous endoscopic gastrostomy (PEG) LUQ (Active)   Securement secured to abdomen 05/04/22 1100   Interventions Prior to Feeding patency checked;residual checked 05/04/22 1100   Feeding Type bolus 05/04/22 1100   Clamp Status/Tolerance clamped;no abdominal discomfort;no abdominal distention;no emesis;no  nausea;no residual;no restlessness 05/04/22 1100   Feeding Action feeding continued 05/03/22 0735   Dressing dry and intact 05/04/22 1100   Insertion Site no redness;no warmth;no drainage;no tenderness;no swelling 05/04/22 1100   Site Care secured w/ tape 05/04/22 1100   Flush/Irrigation flushed w/;water;no resistance met 05/04/22 1100   Dressing Change Due 04/28/22 04/28/22 0912   Intake (mL) 60 mL 04/30/22 1250   Water Bolus (mL) 50 mL 05/03/22 1700   Tube Output(mL)(Include Discarded Residual) 0 mL 04/28/22 1700   Formula Name Isosource 05/03/22 0735   Tube Feeding Intake (mL) 375 05/03/22 1700   Residual Amount (ml) 0 ml 05/04/22 1100   Length Of Feeding (Min) 15 04/28/22 0912   Number of days: 121       Imaging  ECG Results          EKG 12-lead (Final result)  Result time 04/19/22 17:42:02    Final result by Interface, Lab In [facility] (04/19/22 17:42:02)             Narrative:    Test Reason : R06.02,    Vent. Rate : 081 BPM     Atrial Rate : 081 BPM     P-R Int : 158 ms          QRS Dur : 092 ms      QT Int : 366 ms       P-R-T Axes : 061 -70 045 degrees     QTc Int : 425 ms    Normal sinus rhythm  Left axis deviation Now present  Incomplete right bundle branch block  Abnormal ECG  When compared with ECG of 10-MADONNA-2022 10:11,    Confirmed by DIONNE SINGH MD ([id number]) on 4/19/2022 5:41:52 PM    Referred By: [email or web address]   SELF           Confirmed By:DIONNE SINGH MD                            Results for orders placed during the hospital encounter of 04/19/22    Echo    Interpretation Summary  · The left ventricle is normal in size with normal systolic function. The estimated ejection fraction is 60%.  · Normal right ventricular size with normal right ventricular systolic function.  · Normal left ventricular diastolic function.  · The aortic root is mildly dilated.  · Mechanically ventilated; cannot use inferior caval vein diameter to estimate central venous pressure. The IVC is not enlarged and does collapse  somewhat with the respiratory cycle, essentially ruling out venous hypertension.      X-Ray Chest AP Portable  Narrative: EXAMINATION:  XR CHEST AP PORTABLE    CLINICAL HISTORY:  hypoxia;    TECHNIQUE:  Single frontal view of the chest was performed.    COMPARISON:  Chest radiograph 04/22/2022, 04/21/2022, 04/19/2022.    FINDINGS:  Monitoring leads overlie the thorax.  There is a tracheostomy cannula in stable positioning.  The trachea is midline.  Cardiomediastinal silhouette is stable.  There is aortic plaque.  There are small bilateral pleural effusions, probably increased on the left.  Additionally, there is increased opacification over the right lung base, which may reflect atelectasis or developing infiltrate.  No pneumothorax.  The osseous structures appear intact.    There are postoperative changes in the right axillary region there is no evidence of free air beneath the hemidiaphragms.  Impression: Interval development of increased opacification within the right lung base, may reflect developing infiltrate versus atelectasis.    Small bilateral pleural effusions, increased on the left as compared to prior.    Electronically signed by resident: Tim Aparicio  Date:    05/04/2022  Time:    08:52    Electronically signed by: Daniel Álvarez MD  Date:    05/04/2022  Time:    09:12      Labs, Imaging, EKG and Diagnostic results from 5/11/2022 were reviewed.    Medications:  Medication list was reviewed and changes noted under Assessment/Plan.  No current facility-administered medications on file prior to encounter.     Current Outpatient Medications on File Prior to Encounter   Medication Sig Dispense Refill    amLODIPine (NORVASC) 10 MG tablet Take 10 mg by mouth once daily.      atorvastatin (LIPITOR) 20 MG tablet 1 tablet (20 mg total) by Per G Tube route once daily. 90 tablet 3    glycopyrrolate (CUVPOSA) 1 mg/5 mL (0.2 mg/mL) Soln Take 5 mLs (1 mg total) by mouth 3 (three) times daily as needed (TO REDUCE  SECRETIONS). 473 mL 1    acetaminophen (TYLENOL) 325 MG tablet 2 tablets (650 mg total) by Per G Tube route every 6 (six) hours.  0    albuterol-ipratropium (DUO-NEB) 2.5 mg-0.5 mg/3 mL nebulizer solution Take 3 mLs by nebulization every 4 (four) hours as needed for Wheezing. Rescue 75 mL 0    aspirin 81 MG Chew 1 tablet (81 mg total) by Per G Tube route once daily.  0    bisacodyL (DULCOLAX) 10 mg Supp Place 1 suppository (10 mg total) rectally daily as needed.  0    clopidogreL (PLAVIX) 75 mg tablet 1 tablet (75 mg total) by Per G Tube route once daily. 30 tablet 11    duke's soln (benadryl 30 mL, mylanta 30 mL, LIDOcaine 30 mL, nystatin 30 mL) 120mL Take 10 mLs by mouth 4 (four) times daily as needed (mucositis). 360 mL 0    enoxaparin (LOVENOX) 40 mg/0.4 mL Syrg Inject 0.4 mLs (40 mg total) into the skin once daily.      folic acid (FOLVITE) 1 MG tablet 1 tablet (1 mg total) by Per G Tube route once daily.  0    guaiFENesin 200 mg/5 mL Liqd Take 400 mg by mouth every 4 (four) hours as needed (for secretions). 473 mL 3    losartan (COZAAR) 50 MG tablet 1 tablet (50 mg total) by Per G Tube route once daily. 90 tablet 3    melatonin (MELATIN) 3 mg tablet 2 tablets (6 mg total) by Per G Tube route nightly.  0    metoprolol tartrate (LOPRESSOR) 100 MG tablet 1 tablet (100 mg total) by Per G Tube route 2 (two) times daily. 60 tablet 11    ondansetron (ZOFRAN-ODT) 8 MG TbDL Take 1 tablet (8 mg total) by mouth every 8 (eight) hours as needed (nausea). 30 tablet 1    oxyCODONE (ROXICODONE) 5 mg/5 mL Soln 5 mLs (5 mg total) by Per G Tube route every 4 (four) hours as needed (Pain). 150 mL 0    polyethylene glycol (GLYCOLAX) 17 gram PwPk 17 g by Per G Tube route 2 (two) times daily.  0    senna-docusate 8.6-50 mg (PERICOLACE) 8.6-50 mg per tablet 1 tablet by Per G Tube route 2 (two) times daily.      sodium chloride (OCEAN) 0.65 % nasal spray 1 spray by Nasal route as needed for Congestion.  12    sodium  chloride 3% 3 % nebulizer solution Take 4 mLs by nebulization every 8 (eight) hours.      thiamine 100 MG tablet 1 tablet (100 mg total) by Per G Tube route once daily.       Scheduled Medications:  albuterol-ipratropium, 3 mL, Nebulization, Q4H  aspirin, 81 mg, Per G Tube, Daily  atorvastatin, 20 mg, Per G Tube, Daily  clopidogreL, 75 mg, Per G Tube, Daily  enoxaparin, 40 mg, Subcutaneous, Daily  folic acid, 1 mg, Per G Tube, Daily  guaiFENesin 100 mg/5 ml, 200 mg, Per G Tube, Q8H  melatonin, 6 mg, Per G Tube, Nightly  polyethylene glycol, 17 g, Per G Tube, BID  scopolamine, 1 patch, Transdermal, Q3 Days  thiamine, 100 mg, Per G Tube, Daily      PRN: sodium chloride, albuterol-ipratropium, bisacodyL, glycopyrrolate, ondansetron, oxyCODONE, sodium chloride, sodium chloride 0.9%  Infusions:   Estimated Creatinine Clearance: 83 mL/min (based on SCr of 0.7 mg/dL).    Assessment/Plan:      * Acute on chronic respiratory failure  Pt with SCC of the tongue s/p total glossectomy, chemowith cisplatin, and nearing completion of radiation with trach, COPD, asthma, HTN, CAD s/p PCI presenting for acute on chronic hypoxemic respiratory failure. He is on 5L with trach collar at home. He has had increased yellow secretions, work of breathing and SOB for the past several days. BNP elevated to 1600 on admission with pulmonary edema and new cardiomegaly on CXR. Trop wnl. Bicarb is elevated suggesting chronic hypercapnia. Suctioning has cleared thick mucous. He was able to be weaned down to his home O2, however after ~ 20 minutes he desats again, requiring deep suction with resolution of hypoxia. Now stable on home O2. Diuresed -3L     -Pt stable on home O2 for 24 hours (5 liters per NC), ready to stepdown to floor  - CAP coverage with ceftriaxone/azithro  - Methylprednisolone 40 mg x5 days for possible element of COPD exacerbation  - Duonebs q4  - Hypertonic saline nebs  - Chest PT  - Suction PRN    4/21 Transfer to hospital  medicine . 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  initially requiring vent but has now been stable on home trach collar for 24 hours. sats 93% on 8L via trach collar.  Awaiting cultures from sputum, on CAP coverage with ceftriaxone. completed Azithromycin. continue solumedrol 40mg IV daily.   4/22 sats 95% on trach collar 10/ fio2 60% . completed azithromycin.   started on Zosyn for possible aspiration  repeat CX ray-in the inferior hemithorax on the left side consistent with airspace consolidation/volume loss in the left lower lobe is again appreciated, but the lung zones are essentially stable since the prior exam and demonstrate no new areas of airspace consolidation or volume loss.  respiratory culture 4/19 with pseudomonas.     4/30-  oxygen tapered to 8L/ Fio2 35% .  Needs to be at 5 liters to go home. Add guaifenisen for cough and secertion. Up in chair as much as possible  5/1 - not able to wean oxygen.  He may need a LTAC for HF oxygen.   5/3 - on 8 liters NC ( home goal is 5 liters)  5/4 - Now chronic, repeat cxr shows RLL pneumonia. Resume vanc , zosyn. Consult pulm  Up in chair helps to lower rate. He desats at night. This pt likely needs a LTAC for HF oxygen. Insurance company did not call me for a peer to peer yesterday.    5/5   sats 93 % on 8L NC/ fio2 35%. tapered to 5L/28% needs LTAC for HF oxygen.  repeated a cxray 5/4 - Interval development of increased opacification within the right lung base, may reflect developing infiltrate versus atelectasis. started antibiotics- vanc and zosyn. monitor for vancomycin toxity. plan to switch to doxycycline. Consider pulmonary eval .  just completed  course of zosyn 4/29   5/6 tolerated 6L NC/ fio2 35% overnight . peer to peer with blanca today. EKG with sinus tachycardia. discussed [facility] pulmonology. monitor off antibiotics zosyn  and vancomcyn as he completed adequate course of antibiotics for pseudomas and aspiration. CX ray improvement may lag behind treatment   5/7 stable on 5L/ fio2 28% overnight.      Goals of care, counseling/discussion  Advance Care Planning      Does patient have Capacity? Yes  Contact: Bridgett mitchell, wife  Goals/Wishes: wants to go home, HH w Home PT  Code Status- FULL  Pain management- seems comfortable without pain  Prognosis- s/p radiation, progressing cancer, high risk for aspiration, severe malnutrition  Family discussions-  4/28, 4/29 - discussed care and condition w pt and his wife.  He can talk w trach collar but sats drop.  He wants to go home. Has all oxygen supplies at home. Pt completed zosyn.   5/2 - spoke to his wife. Reviewed his care and condition. She was able to calm him down. She is encouraging him. Will start remeron for appetite and depression. She is working hard to keep him comfortable and well-cared for.  Functional Status - has trach and peg. recommended outpt f/u palliative care    Post acute care plan: pt would benefit from outpt referral to Palliative care, plan for HH and home.    5/4 - not able to wean oxygen.  He needs a LTAC for HF oxygen. Waiting on ins approval      Hypernatremia  4/25 sodium 150. started on D5W . repeat renal panel in PM   5/4- sodium trended down to 145 -> 140, on TFs -> 138 -> 139, not receiving extra water in TFs -> 141    Hypophosphatemia  Replaced  5/4- stable      Aspiration pneumonia  4/22 sats 95% on trach collar 10/ fio2 60% . completed azithromycin.   started on Zosyn for possible aspiration  repeat CX ray-in the inferior hemithorax on the left side consistent with airspace consolidation/volume loss in the left lower lobe is again appreciated, but the lung zones are essentially stable since the prior exam and demonstrate no new areas of airspace consolidation or volume loss.  respiratory culture 4/19 with pseudomonas.   -  completed zosyn  5/4 - new  infiltrate RLL. Resume zosyn, vanc, consult pulm     Sinus tachycardia        Dysphagia  Nutrition consulted. Most recent weight and BMI monitored-          Malnutrition (Moderate to Severe)  Weight Loss (Malnutrition): greater than 10% in 6 months                 Measurements:  Wt Readings from Last 1 Encounters:   05/04/22 62.4 kg (137 lb 9.1 oz)   Body mass index is 20.92 kg/m².    Recommendations: Recommendation/Intervention: 1.  Goals: Meet % EEN, EPN by RD f/u date    Patient has been screened and assessed by RD. RD will follow patient.    4/21 secondary to above. on G tube feedings   4/22 changed to bolus GT feedings.   4/29 - pt noted to sometimes refuse TFs. , on bolus TFs, no water added      Hypokalemia  replaced   5/4 - stable      Chronic respiratory failure with hypoxia, on home O2 therapy  secondary to COPD. on 5L oxygen  at home  5/4  - continue weaning efforts, currently on 8 liters  5/5  past week not able to wean oxygen.   sats 93 % on 8L NC/ fio2 35%. needs LTAC for HF oxygen.  repeated a cxray 5/4 - Interval development of increased opacification within the right lung base, may reflect developing infiltrate versus atelectasis. started antibiotics- vanc and zosyn.  Consulted pulmonary.  just completed  course of zosyn 4/29     Protein-calorie malnutrition  Nutrition consulted. Most recent weight and BMI monitored-          Malnutrition (Moderate to Severe)  Weight Loss (Malnutrition): greater than 10% in 6 months              Measurements:  Wt Readings from Last 1 Encounters:   05/04/22 62.4 kg (137 lb 9.1 oz)   Body mass index is 20.92 kg/m².    Recommendations: Recommendation/Intervention: 1.  Goals: Meet % EEN, EPN by RD f/u date    Patient has been screened and assessed by RD. RD will follow patient.  See above      Normocytic anemia    Patient's with Normocytic anemia.. Hemoglobin stable. Etiology likely due to chronic disease .  Current CBC reviewed-    Recent Labs   Lab  05/02/22  0632 05/03/22  0457 05/04/22  0500   HGB 9.5* 9.5* 9.4*     Monitor CBC and transfuse if H/H drops below 7/21.   - improving      Other hyperlipidemia  Continue home statin         Primary hypertension  Chronic, controlled.  Latest blood pressure and vitals reviewed-   Temp:  [97.3 °F (36.3 °C)-98.5 °F (36.9 °C)]   Pulse:  []   Resp:  [17-25]   BP: ()/(64-81)   SpO2:  [89 %-97 %] .   Home meds for hypertension were reviewed- amlodipine, losartan and metoprolol  held for now  PRN meds if BP> 180/110 mm HG  5/4 - not requiring scheduled meds      Coronary artery disease involving native coronary artery of native heart without angina pectoris  Continue ASA, plavix, and statin   -stable    Squamous cell cancer of tongue    12/15/21 FNA left neck mass : squamous cell carcinoma, p16 negative  1/4/22 total glossectomy, bilateral neck dissection, bilateral cervical facial advancement flaps and anterolateral thigh free flap reconstruction of his glossectomy defect.  Final path :  [id number] (+microscopic SALINAS, single contralateral node), superior soft tissue margin of left neck with tumor.  2/28/22 start chemoradiation  Finished chemo with cisplatin 4/6. Was going to complete final dose of radiation tomorrow.     -Rad/onc consulted for radiation while inpatient  4/22  s/p radiation oncology eval -on adjuvant chemoradiation, completed 31/33 fractions of RT.  renitiation of RT  inpatient when patient able to tolerate.    4/23  completed radiation sessions  5/4 -attempting to  wean oxygen, takes 5 liters at home and has all supplies.     VTE Risk Mitigation (From admission, onward)         Ordered     enoxaparin injection 40 mg  Daily         04/19/22 1123     IP VTE HIGH RISK PATIENT  Once         04/19/22 1123     Place sequential compression device  Until discontinued         04/19/22 1123                Discharge Planning   NISA: 5/12/2022     Code Status: Full Code   Is the patient medically ready for  discharge?: Yes    Reason for patient still in hospital (select all that apply): Treatment  Discharge Plan A: Rehab   Discharge Delays: None known at this time              Jordan Armenta MD  Department of Hospital Medicine   [facility] - Telemetry Stepdown

## 2022-05-11 NOTE — PT/OT/SLP PROGRESS
"Speech Language Pathology Treatment    Patient Name:  Fareed Richard Jr.   MRN:  [id number]  Admitting Diagnosis: Acute on chronic respiratory failure    Recommendations:                 General Recommendations:  Speech/language therapy  Diet recommendations:  NPO, Liquid Diet Level: NPO   Aspiration Precautions: Standard aspiration precautions   General Precautions: Standard, fall  Communication strategies:  One way speaking valve    Subjective     Awake/alert  "I don't want to lose it." Re PMSV    Pain/Comfort:  · Pain Rating 1: 0/10  · Pain Rating Post-Intervention 1: 0/10    Respiratory Status:trach collar    Objective:     Has the patient been evaluated by SLP for swallowing?   Yes  Keep patient NPO? No     Passy [place] Speaking Valve  Trach size/type: Shiley 6   Able to phonate around trach: yes  O2 sats at rest:97 %  O2 sats with PMSV in place:97%  Vocal quality with valve in place: clear, ~75% intelligible 2/2 glossectomy  Back pressure upon removal:none  Minutes PMSV worn: Pt wore valve throughout session. Valve left in place, NSG notified  Education topics addressed: cleaning valve, one-way technology, anatomy of trach, precautions with cuffed trach, removal of valve prior to sleeping      Assessment:     Fareed Richard Jr. is a 72 y.o. male with an SLP diagnosis of One Way Speaking Valve Training.    Goals:   Multidisciplinary Problems     SLP Goals        Problem: SLP    Goal Priority Disciplines Outcome   SLP Goal     SLP Ongoing, Progressing   Description: Speech Language Pathology Goals  Goals expected to be met by 5/17:    1. Patient will participate in ongoing swallow assessment.   2. Pt will tolerate PMSV with O2 >93% and VSS to improve respiration and phonation.   3. Pt will demonstrate placement/removal of PMSV x5 indep.   4. Pt will vocalize x3 with PMSV in place  5. Pt will recall instructions/precautions of PMSV with 100% acc indep.                             Plan:     · Patient to be seen:  3 " x/week   · Plan of Care reviewed with:  patient   · SLP Follow-Up:  Yes       Discharge recommendations:  rehabilitation facility       Time Tracking:     SLP Treatment Date:   05/11/22  Speech Start Time:  0846  Speech Stop Time:  0901     Speech Total Time (min):  15 min    Billable Minutes: Speech Therapy Individual 7 and Self Care/Home Management Training 8    05/11/2022

## 2022-05-12 VITALS
RESPIRATION RATE: 20 BRPM | BODY MASS INDEX: 20.85 KG/M2 | DIASTOLIC BLOOD PRESSURE: 99 MMHG | OXYGEN SATURATION: 92 % | WEIGHT: 137.56 LBS | HEIGHT: 68 IN | HEART RATE: 118 BPM | TEMPERATURE: 98 F | SYSTOLIC BLOOD PRESSURE: 144 MMHG

## 2022-05-12 LAB
ALBUMIN SERPL BCP-MCNC: 2.6 G/DL (ref 3.5–5.2)
ALP SERPL-CCNC: 75 U/L (ref 55–135)
ALT SERPL W/O P-5'-P-CCNC: 10 U/L (ref 10–44)
ANION GAP SERPL CALC-SCNC: 6 MMOL/L (ref 8–16)
AST SERPL-CCNC: 14 U/L (ref 10–40)
BASOPHILS # BLD AUTO: 0.04 K/UL (ref 0–0.2)
BASOPHILS NFR BLD: 0.5 % (ref 0–1.9)
BILIRUB SERPL-MCNC: 0.2 MG/DL (ref 0.1–1)
BUN SERPL-MCNC: 24 MG/DL (ref 8–23)
CALCIUM SERPL-MCNC: 9.1 MG/DL (ref 8.7–10.5)
CHLORIDE SERPL-SCNC: 98 MMOL/L (ref 95–110)
CO2 SERPL-SCNC: 31 MMOL/L (ref 23–29)
CREAT SERPL-MCNC: 0.7 MG/DL (ref 0.5–1.4)
DIFFERENTIAL METHOD: ABNORMAL
EOSINOPHIL # BLD AUTO: 0.2 K/UL (ref 0–0.5)
EOSINOPHIL NFR BLD: 2.3 % (ref 0–8)
ERYTHROCYTE [DISTWIDTH] IN BLOOD BY AUTOMATED COUNT: 15.8 % (ref 11.5–14.5)
EST. GFR  (AFRICAN AMERICAN): >60 ML/MIN/1.73 M^2
EST. GFR  (NON AFRICAN AMERICAN): >60 ML/MIN/1.73 M^2
GLUCOSE SERPL-MCNC: 110 MG/DL (ref 70–110)
HCT VFR BLD AUTO: 29.5 % (ref 40–54)
HGB BLD-MCNC: 9.1 G/DL (ref 14–18)
IMM GRANULOCYTES # BLD AUTO: 0.02 K/UL (ref 0–0.04)
IMM GRANULOCYTES NFR BLD AUTO: 0.2 % (ref 0–0.5)
LYMPHOCYTES # BLD AUTO: 0.6 K/UL (ref 1–4.8)
LYMPHOCYTES NFR BLD: 7.5 % (ref 18–48)
MAGNESIUM SERPL-MCNC: 2 MG/DL (ref 1.6–2.6)
MCH RBC QN AUTO: 28.8 PG (ref 27–31)
MCHC RBC AUTO-ENTMCNC: 30.8 G/DL (ref 32–36)
MCV RBC AUTO: 93 FL (ref 82–98)
MONOCYTES # BLD AUTO: 0.8 K/UL (ref 0.3–1)
MONOCYTES NFR BLD: 9.8 % (ref 4–15)
NEUTROPHILS # BLD AUTO: 6.7 K/UL (ref 1.8–7.7)
NEUTROPHILS NFR BLD: 79.7 % (ref 38–73)
NRBC BLD-RTO: 0 /100 WBC
PHOSPHATE SERPL-MCNC: 3.7 MG/DL (ref 2.7–4.5)
PLATELET # BLD AUTO: 225 K/UL (ref 150–450)
PMV BLD AUTO: 9.2 FL (ref 9.2–12.9)
POTASSIUM SERPL-SCNC: 4.4 MMOL/L (ref 3.5–5.1)
PROT SERPL-MCNC: 6.3 G/DL (ref 6–8.4)
RBC # BLD AUTO: 3.16 M/UL (ref 4.6–6.2)
SODIUM SERPL-SCNC: 135 MMOL/L (ref 136–145)
WBC # BLD AUTO: 8.36 K/UL (ref 3.9–12.7)

## 2022-05-12 PROCEDURE — 99900026 HC AIRWAY MAINTENANCE (STAT)

## 2022-05-12 PROCEDURE — 97535 SELF CARE MNGMENT TRAINING: CPT

## 2022-05-12 PROCEDURE — 80053 COMPREHEN METABOLIC PANEL: CPT | Performed by: HOSPITALIST

## 2022-05-12 PROCEDURE — 27200966 HC CLOSED SUCTION SYSTEM

## 2022-05-12 PROCEDURE — 25000242 PHARM REV CODE 250 ALT 637 W/ HCPCS: Performed by: STUDENT IN AN ORGANIZED HEALTH CARE EDUCATION/TRAINING PROGRAM

## 2022-05-12 PROCEDURE — 92507 TX SP LANG VOICE COMM INDIV: CPT

## 2022-05-12 PROCEDURE — 94640 AIRWAY INHALATION TREATMENT: CPT

## 2022-05-12 PROCEDURE — 94761 N-INVAS EAR/PLS OXIMETRY MLT: CPT

## 2022-05-12 PROCEDURE — 25000242 PHARM REV CODE 250 ALT 637 W/ HCPCS: Performed by: INTERNAL MEDICINE

## 2022-05-12 PROCEDURE — 99900035 HC TECH TIME PER 15 MIN (STAT)

## 2022-05-12 PROCEDURE — 99239 PR HOSPITAL DISCHARGE DAY,>30 MIN: ICD-10-PCS | Mod: ,,, | Performed by: HOSPITALIST

## 2022-05-12 PROCEDURE — 99239 HOSP IP/OBS DSCHRG MGMT >30: CPT | Mod: ,,, | Performed by: HOSPITALIST

## 2022-05-12 PROCEDURE — 25000003 PHARM REV CODE 250: Performed by: STUDENT IN AN ORGANIZED HEALTH CARE EDUCATION/TRAINING PROGRAM

## 2022-05-12 PROCEDURE — 27000221 HC OXYGEN, UP TO 24 HOURS

## 2022-05-12 PROCEDURE — 83735 ASSAY OF MAGNESIUM: CPT | Performed by: HOSPITALIST

## 2022-05-12 PROCEDURE — 97530 THERAPEUTIC ACTIVITIES: CPT

## 2022-05-12 PROCEDURE — 85025 COMPLETE CBC W/AUTO DIFF WBC: CPT | Performed by: HOSPITALIST

## 2022-05-12 PROCEDURE — 92609 USE OF SPEECH DEVICE SERVICE: CPT

## 2022-05-12 PROCEDURE — 84100 ASSAY OF PHOSPHORUS: CPT | Performed by: HOSPITALIST

## 2022-05-12 PROCEDURE — 36415 COLL VENOUS BLD VENIPUNCTURE: CPT | Performed by: HOSPITALIST

## 2022-05-12 RX ORDER — SCOLOPAMINE TRANSDERMAL SYSTEM 1 MG/1
1 PATCH, EXTENDED RELEASE TRANSDERMAL
Qty: 10 PATCH | Refills: 0 | Status: SHIPPED | OUTPATIENT
Start: 2022-05-13 | End: 2022-06-12

## 2022-05-12 RX ORDER — ALPRAZOLAM 0.25 MG/1
0.25 TABLET ORAL ONCE
Status: DISCONTINUED | OUTPATIENT
Start: 2022-05-12 | End: 2022-05-12 | Stop reason: HOSPADM

## 2022-05-12 RX ADMIN — ASPIRIN 81 MG CHEWABLE TABLET 81 MG: 81 TABLET CHEWABLE at 09:05

## 2022-05-12 RX ADMIN — IPRATROPIUM BROMIDE AND ALBUTEROL SULFATE 3 ML: 2.5; .5 SOLUTION RESPIRATORY (INHALATION) at 08:05

## 2022-05-12 RX ADMIN — FOLIC ACID 1 MG: 1 TABLET ORAL at 09:05

## 2022-05-12 RX ADMIN — IPRATROPIUM BROMIDE AND ALBUTEROL SULFATE 3 ML: 2.5; .5 SOLUTION RESPIRATORY (INHALATION) at 03:05

## 2022-05-12 RX ADMIN — ATORVASTATIN CALCIUM 20 MG: 20 TABLET, FILM COATED ORAL at 09:05

## 2022-05-12 RX ADMIN — IPRATROPIUM BROMIDE AND ALBUTEROL SULFATE 3 ML: 2.5; .5 SOLUTION RESPIRATORY (INHALATION) at 11:05

## 2022-05-12 RX ADMIN — CLOPIDOGREL 75 MG: 75 TABLET, FILM COATED ORAL at 09:05

## 2022-05-12 RX ADMIN — POLYETHYLENE GLYCOL 3350 17 G: 17 POWDER, FOR SOLUTION ORAL at 09:05

## 2022-05-12 RX ADMIN — OXYCODONE HYDROCHLORIDE 5 MG: 5 SOLUTION ORAL at 12:05

## 2022-05-12 RX ADMIN — THIAMINE HCL TAB 100 MG 100 MG: 100 TAB at 09:05

## 2022-05-12 NOTE — ASSESSMENT & PLAN NOTE
Chronic, controlled.  Latest blood pressure and vitals reviewed-   Temp:  [97.8 °F (36.6 °C)-98.5 °F (36.9 °C)]   Pulse:  []   Resp:  [18-29]   BP: (101-137)/(67-81)   SpO2:  [93 %-98 %] .   Home meds for hypertension were reviewed- amlodipine, losartan and metoprolol  held for now  PRN meds if BP> 180/110 mm HG  5/4 - not requiring scheduled meds

## 2022-05-12 NOTE — ASSESSMENT & PLAN NOTE
secondary to COPD. on 5L oxygen  at home  5/4  - continue weaning efforts, currently on 8 liters  5/5  past week not able to wean oxygen.   sats 93 % on 8L NC/ fio2 35%. needs LTAC for HF oxygen.  repeated a cxray 5/4 - Interval development of increased opacification within the right lung base, may reflect developing infiltrate versus atelectasis. started antibiotics- vanc and zosyn.  Consulted pulmonary.  just completed  course of zosyn 4/29 5/12 - on baseline oxygen, 4-5 liters per trache

## 2022-05-12 NOTE — PLAN OF CARE
Problem: Adult Inpatient Plan of Care  Goal: Plan of Care Review  Outcome: Ongoing, Progressing  Problem: Impaired Wound Healing  Goal: Optimal Wound Healing  Outcome: Ongoing, Progressing     Problem: Skin Injury Risk Increased  Goal: Skin Health and Integrity  Outcome: Ongoing, Progressing     Problem: Communication Impairment (Artificial Airway)  Goal: Effective Communication  Outcome: Ongoing, Progressing     Problem: Device-Related Complication Risk (Artificial Airway)  Goal: Optimal Device Function  Outcome: Ongoing, Progressing     Problem: Skin and Tissue Injury (Artificial Airway)  Goal: Absence of Device-Related Skin or Tissue Injury  Outcome: Ongoing, Progressing     Problem: Fall Injury Risk  Goal: Absence of Fall and Fall-Related Injury  Outcome: Ongoing, Progressing     Problem: Noninvasive Ventilation Acute  Goal: Effective Unassisted Ventilation and Oxygenation  Outcome: Ongoing, Progressing    Problem: Adult Inpatient Plan of Care  Goal: Plan of Care Review  Outcome: Ongoing, Progressing  Goal: Patient-Specific Goal (Individualized)  Outcome: Ongoing, Progressing  Goal: Absence of Hospital-Acquired Illness or Injury  Outcome: Ongoing, Progressing  Goal: Optimal Comfort and Wellbeing  Outcome: Ongoing, Progressing  Goal: Readiness for Transition of Care  Outcome: Ongoing, Progressing

## 2022-05-12 NOTE — ASSESSMENT & PLAN NOTE
Patient's with Normocytic anemia.. Hemoglobin stable. Etiology likely due to chronic disease .  Current CBC reviewed-    Recent Labs   Lab 05/10/22  0337 05/11/22  0412 05/12/22  0343   HGB 9.3* 9.5* 9.1*     Monitor CBC and transfuse if H/H drops below 7/21.   - improving

## 2022-05-12 NOTE — ASSESSMENT & PLAN NOTE
Advance Care Planning      Does patient have Capacity? Yes  Contact: Bridgett mitchell, wife  Goals/Wishes: wants to go home, HH w Home PT  Code Status- FULL  Pain management- seems comfortable without pain  Prognosis- s/p radiation, progressing cancer, high risk for aspiration, severe malnutrition  Family discussions-  4/28, 4/29 - discussed care and condition w pt and his wife.  He can talk w trach collar but sats drop.  He wants to go home. Has all oxygen supplies at home. Pt completed zosyn.   5/2 - spoke to his wife. Reviewed his care and condition. She was able to calm him down. She is encouraging him. Will start remeron for appetite and depression. She is working hard to keep him comfortable and well-cared for.  Functional Status - has trach and peg. recommended outpt f/u palliative care    Post acute care plan: pt would benefit from outpt referral to Palliative care, plan for HH and home.   5/12 - oxygen weaned witing for rehab acceptance and ins appoval

## 2022-05-12 NOTE — PT/OT/SLP PROGRESS
"Physical Therapy Treatment    Patient Name:  Fareed Richard Jr.   MRN:  [id number]    Recommendations:     Discharge Recommendations:  rehabilitation facility   Discharge Equipment Recommendations: none   Barriers to discharge: Inaccessible home, Decreased caregiver support and increased assistance required    Assessment:     Fareed Richard Jr. is a 72 y.o. male admitted with a medical diagnosis of Acute on chronic respiratory failure.  He presents with the following impairments/functional limitations:  weakness, decreased safety awareness, impaired balance, impaired endurance, impaired cardiopulmonary response to activity, impaired functional mobilty, gait instability.  Pt participated in functional mobility training, able to complete bed mobility, transfers, and take a few steps within room with CGA/min assist. Continues to have reduced activity tolerance and general energy reserve with associated increasing in HR/decrease in oxygen saturation with exertion. Pt continues to present below their independent prior functional baseline. Pt would benefit from continued, skilled PT while in house to address the above listed impairments, further progress mobility as able, return towards highest level of function.      Rehab Prognosis: Good; patient continues to benefit from acute skilled PT services to address these deficits and reach maximum level of function.  Patient remains most appropriate to discharge to rehabilitation facility  Recent Surgery: * No surgery found *      Plan:     During this hospitalization, patient to be seen 3 x/week to address the identified rehab impairments via gait training, therapeutic activities, therapeutic exercises, neuromuscular re-education and progress toward the following goals:    · Plan of Care Expires:  05/23/22    Subjective     Subjective: "I'll get to the chair"   Pain/Comfort:   · Pain Rating 1: 0/10      Objective:     Communicated with RN prior to session.  Patient found supine " with Tracheostomy, telemetry, pulse ox (continuous), oxygen upon PT entry to room.      General Precautions: Standard, fall   Orthopedic Precautions:N/A   Braces: N/A     Functional Mobility:  · Bed Mobility: supervision for supine>sit with HOB elevated ~30 degrees  · Transfers: CGA for sit<>stand via HHA as pt deferred use of RW on this date, preferring to transfer to chair  · Gait: Patient ambulated `~4 small steps from bed>chair with min assist via HHA and assistance for line management. Flexed posture throughout however pt able to hold armrest of chair/bed rail to assist with mobility   · Balance:   · Sitting: supervision while seated EOB  · Standing: CGA/min assist while standing, anterior trunk sway noted, no overt LOB      AM-PAC 6 CLICK MOBILITY  Turning over in bed (including adjusting bedclothes, sheets and blankets)?: 3  Sitting down on and standing up from a chair with arms (e.g., wheelchair, bedside commode, etc.): 3  Moving from lying on back to sitting on the side of the bed?: 3  Moving to and from a bed to a chair (including a wheelchair)?: 3  Need to walk in hospital room?: 3  Climbing 3-5 steps with a railing?: 3  Basic Mobility Total Score: 18       Education:  Education provided re: d/c planning, PT POC, safety in mobility, pacing strategies. Pt endorsed understanding.     Patient left up in chair with all lines intact and call button in reach.    GOALS:   Multidisciplinary Problems     Physical Therapy Goals        Problem: Physical Therapy    Goal Priority Disciplines Outcome Goal Variances Interventions   Physical Therapy Goal     PT, PT/OT Ongoing, Progressing     Description: Goals to be met by: 22, updated on 22    Patient will increase functional independence with mobility by performin. Pt will perform supine<>sit with supervision to improve independence with mobility  2. Pt will perform functional transfers with supervision   3. Pt will ambulate >150 ft feet with LRAD and  supervision to safely perform household distance ambulation  4. Pt will ascend/descend 5 stairs with supervision to safely manage within home.                    Time Tracking:     PT Received On: 05/12/22  PT Start Time: 1019     PT Stop Time: 1035  PT Total Time (min): 16 min     Billable Minutes: Therapeutic Activity 16 min    Treatment Type: Treatment  PT/PTA: PT     PTA Visit Number: 1     Beatriz Castro PT, DPT  5/12/2022

## 2022-05-12 NOTE — AI DETERIORATION ALERT
"RAPID RESPONSE NURSE AI ALERT       AI alert received.    Chart Reviewed: 05/12/2022, 3:24 PM    MRN: [id number]  Bed: 8073/8094 A    Dx: Acute on chronic respiratory failure    Fareed Richard Jr. has a past medical history of Cancer, COPD (chronic obstructive pulmonary disease), Hyperlipidemia, Hypertension, and Pseudoaneurysm.    Last VS: BP (!) 144/99 (BP Location: Right arm, Patient Position: Lying)   Pulse (!) 118   Temp 97.9 °F (36.6 °C) (Axillary)   Resp (!) 26   Ht 5' 8" (1.727 m)   Wt 62.4 kg (137 lb 9.1 oz)   SpO2 (!) 89%   BMI 20.92 kg/m²     24H Vital Sign Range:  Temp:  [97.9 °F (36.6 °C)-98.5 °F (36.9 °C)]   Pulse:  []   Resp:  [18-33]   BP: (101-144)/(69-99)   SpO2:  [87 %-98 %]     Level of Consciousness (AVPU): alert    Recent Labs     05/10/22  0337 05/11/22  0412 05/12/22  0343   WBC 4.35 7.18 8.36   HGB 9.3* 9.5* 9.1*   HCT 30.6* 31.3* 29.5*    225 225       Recent Labs     05/10/22  0337 05/10/22  1031 05/11/22  0412 05/12/22  0343    140 139 135*   K 5.2* 4.6 5.0 4.4    100 100 98   CO2 30* 33* 30* 31*   CREATININE 0.7 0.8 0.7 0.7   GLU 90 130* 103 110   PHOS 4.8* 4.4 4.3 3.7   MG 2.2  --  2.3 2.0        No results for input(s): PH, PCO2, PO2, HCO3, POCSATURATED, BE in the last 72 hours.     OXYGEN:  Flow (L/min): 8  Oxygen Concentration (%): 28  O2 Device (Oxygen Therapy): tracheostomy collar    MEWS score: 4    bedside RNSalty  contacted. No concerns verbalized at this time. Instructed to call [phone] for further concerns or assistance.    Nikki Romero RN        "

## 2022-05-12 NOTE — ASSESSMENT & PLAN NOTE
Nutrition consulted. Most recent weight and BMI monitored-          Malnutrition (Moderate to Severe)  Weight Loss (Malnutrition): greater than 10% in 6 months                 Measurements:  Wt Readings from Last 1 Encounters:   05/11/22 62.4 kg (137 lb 9.1 oz)   Body mass index is 20.92 kg/m².    Recommendations: Recommendation/Intervention: 1.  Goals: Meet % EEN, EPN by RD f/u date    Patient has been screened and assessed by RD. RD will follow patient.    4/21 secondary to above. on G tube feedings   4/22 changed to bolus GT feedings.   4/29 - pt noted to sometimes refuse TFs. , on bolus TFs, no water added

## 2022-05-12 NOTE — RESPIRATORY THERAPY
RAPID RESPONSE RESPIRATORY THERAPY PROACTIVE NOTE           Time of visit: 916     Code Status: Full Code   : 1950  Bed: 8094/8094 A:   MRN: [id number]  Time spent at the bedside: < 15 min    SITUATION    Evaluated patient for: LDA Check     BACKGROUND    Patient has a past medical history of Cancer, COPD (chronic obstructive pulmonary disease), Hyperlipidemia, Hypertension, and Pseudoaneurysm.  Clinically Significant Surgical Hx: tracheostomy    24 Hours Vitals Range:  Temp:  [97.8 °F (36.6 °C)-98.5 °F (36.9 °C)]   Pulse:  []   Resp:  [18-33]   BP: (101-137)/(67-81)   SpO2:  [87 %-98 %]     Labs:    Recent Labs     05/10/22  0337 05/10/22  1031 22  0412 22  0343    140 139 135*   K 5.2* 4.6 5.0 4.4    100 100 98   CO2 30* 33* 30* 31*   CREATININE 0.7 0.8 0.7 0.7   GLU 90 130* 103 110   PHOS 4.8* 4.4 4.3 3.7   MG 2.2  --  2.3 2.0        No results for input(s): PH, PCO2, PO2, HCO3, POCSATURATED, BE in the last 72 hours.    ASSESSMENT/INTERVENTIONS    Upon arrival in room pt resting comfortably with no respiratory needs    Last VS   Temp: 97.9 °F (36.6 °C) (741)  Pulse: 104 (916)  Resp: 33 (916)  BP: 101/79 (741)  SpO2: 87 % (916)    Level of Consciousness: Level of Consciousness (AVPU): alert  Respiratory Effort: Respiratory Effort: Unlabored, Normal Expansion/Accessory Muscle Usage: Expansion/Accessory Muscles/Retractions: expansion symmetric  All Lung Field Breath Sounds: All Lung Fields Breath Sounds: Anterior:, Posterior:, Lateral:, coarse  DEE Breath Sounds: coarse  LLL Breath Sounds: coarse  RUL Breath Sounds: diminished  RML Breath Sounds: diminished  RLL Breath Sounds: diminished  O2 Device/Concentration: Flow (L/min): 5, Oxygen Concentration (%): 28, O2 Device (Oxygen Therapy): Trach Collar  Surgical airway: Yes, Type: Shiley Size: 6, uncuffed  Ambu at bedside: Ambu bag with the patient?: Yes, Adult Ambu     Active Orders   Respiratory  Care    Chest physiotherapy TID     Frequency: TID     Number of Occurrences: Until Specified     Order Questions:      Indications: COPIOUS SPUTUM PRODUCTION      Indications: ATELECTASIS PROPHYLAXIS      Indications: ATELECTASIS NONRESPONSIVE TO TX    Inhalation Treatment Q4H     Frequency: Q4H     Number of Occurrences: Until Specified    Oxygen Continuous     Frequency: Continuous     Number of Occurrences: Until Specified     Order Questions:      Device type: Low flow      Device: Trach Collar (Comment: 8L)      FiO2%: 35%      Titrate O2 per Oxygen Titration Protocol: Yes      To maintain SpO2 goal of: >= 90%      Notify MD of: Inability to achieve desired SpO2; Sudden change in patient status and requires 20% increase in FiO2; Patient requires >60% FiO2    Pulse Oximetry Continuous     Frequency: Continuous     Number of Occurrences: Until Specified    Routine tracheostomy care     Frequency: BID     Number of Occurrences: Until Specified       RECOMMENDATIONS    We recommend: RRT Recs: Continue POC per primary team.    ESCALATION        FOLLOW-UP    Please call back the Rapid Response RT, Tyrese Foster, RRT at x [phone] for any questions or concerns.

## 2022-05-12 NOTE — PT/OT/SLP PROGRESS
"Speech Language Pathology Treatment    Patient Name:  Fareed Richard Jr.   MRN:  [id number]  Admitting Diagnosis: Acute on chronic respiratory failure    Recommendations:                 General Recommendations:  Speech/language therapy  Diet recommendations:  NPO, Liquid Diet Level: NPO   Aspiration Precautions: Frequent oral care and Strict aspiration precautions   General Precautions: Standard, aspiration, NPO  Communication strategies:  One way speaking valve    Subjective     "I was at  yesterday"    Spoke with RN prior to entering pt's room, who reported pt with increased O2 requirements overnight. Pt seen bedside for session. Alert and agreeable to ST with no c/o pain.       Pain/Comfort:  · Pain Rating 1: 0/10  · Pain Rating Post-Intervention 1: 0/10    Respiratory Status: trach collar    Objective:     Has the patient been evaluated by SLP for swallowing?   Yes  Keep patient NPO? Yes   Current Respiratory Status:  trach collar      Pt seen for ongoing PMSV training. Increased O2 requirements this date, at 8L. Also with decreased O2 sats at rest and with PMSV in place. Pt reported no discomfort or difficulty breathing with valve in place, but trials limited.     Passy [place] Speaking Valve  Trach size/type: Shiley 6   Able to phonate around trach: yes  O2 sats at rest: 85% on arrival, but increased to 88%  O2 sats with PMSV in place:92%, then decreased to mid 80s; PMSV removed  Vocal quality with valve in place: clear, ~75% intelligible 2/2 glossectomy  Back pressure upon removal:none  Minutes PMSV worn: 2 minute trials x2  Education topics addressed: cleaning valve, one-way technology, anatomy of trach, precautions with cuffed trach, removal of valve prior to sleeping    Assessment:     Fareed Richard Jr. is a 72 y.o. male with an SLP diagnosis of One Way Speaking Valve Training.  He presents with increased O2 requirements and decreased tolerance of PMSV this date.    Goals:   Multidisciplinary Problems  "    SLP Goals        Problem: SLP    Goal Priority Disciplines Outcome   SLP Goal     SLP Ongoing, Progressing   Description: Speech Language Pathology Goals  Goals expected to be met by 5/17:    1. Patient will participate in ongoing swallow assessment.   2. Pt will tolerate PMSV with O2 >93% and VSS to improve respiration and phonation.   3. Pt will demonstrate placement/removal of PMSV x5 indep.   4. Pt will vocalize x3 with PMSV in place  5. Pt will recall instructions/precautions of PMSV with 100% acc indep.                             Plan:     · Patient to be seen:  4 x/week   · Plan of Care expires:     · Plan of Care reviewed with:  patient   · SLP Follow-Up:  Yes       Discharge recommendations:  rehabilitation facility   Barriers to Discharge:  Level of Skilled Assistance Needed .    Time Tracking:     SLP Treatment Date:   05/12/22  Speech Start Time:  1005  Speech Stop Time:  1020     Speech Total Time (min):  15 min    Billable Minutes: Therapeutic Speech Device 7 and Self Care/Home Management Training 8    05/12/2022

## 2022-05-12 NOTE — PLAN OF CARE
Pt was discharged with all belongings, wife was called and notified that transportation was present in the room and getting ready to transfer the pt. Discharge instruction and education was given to the pt, who demonstrated understanding. Report was given to the receiving nurse  for the pt, and all questions regarding care where answered. Pt's vitals were stable and pt vocalized he was feeling fine after he safely transferred to [facility].Pt left safely in the care of ambulatory transportation

## 2022-05-12 NOTE — DISCHARGE SUMMARY
Pasha Cherry - Telemetry [facility] Medicine  Discharge Summary      Patient Name: Fareed Richard Jr.  MRN: [id number]  Patient Class: IP- Inpatient  Admission Date: 4/19/2022  Hospital Length of Stay: 23 days  Discharge Date and Time: No discharge date for patient encounter.  Attending Physician: Pam Bush MD   Discharging Provider: Pam Bush MD  Primary Care Provider: Kodi Tubbs MD  [facility] Medicine Team: [facility] HOSP MED N Pam Bush MD    HPI:   Mr. 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 Per wife he has had several days of increasing lethargy, increased work of breathing and secretions. Yesterday she became particularly concerned when he became slightly less responsive and interactive. Wife also notes increased yellow thick secretions from trach site. Upon EMS arrival he was on his home 5 L by trach collar and saturating 93% with significant wheezing.  He received 1 DuoNeb by them.  He was also suctioned.     Currently patient is alert, following commands.  He is responding to yes no questions.  He denies any chest pain but does endorse shortness of breath and cough.  He denies any abdominal pain or pain elsewhere       ED course:   He was given 500ccs IVF as well as one time doses of vanc/cefepime/azithro. Labs significant for BNP of 1600, WBC 5k, K 5.6, Bicarb 35, VBG 7.26/87/25/40. Trop wnl. CXR with new cardiomegaly and pulmonary edema. Suctioned with return of copious secretions. ICU was consulted for acute hypoxemic respiratory failure and he will be admitted for further management.    Interval History:     Admitted to ICU, started on CAP coverage and solumedrol. Quickly weaned off vent after return of copious secretions on suction. Has been maintaining sats well on home O2. Rad/onc consulted for completion of  radiology while inpatient. Pending culture sensitivity.    4/21 Transfer to hospital medicine . 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  initially requiring vent but has now been stable on home trach collar for 24 hours. sats 93% on 8L via trach collar.  Awaiting cultures from sputum, on CAP coverage with ceftriaxone. completed Azithromycin. continue solumedrol 40mg IV daily.  rad/onc consulted this morning-he was supposed to complete radiation outpt but was admitted, attempting to set up for inpatient  4/22 changed to bolus GT feedings. s/p radiation oncology eval -on adjuvant chemoradiation, completed 31/33 fractions of RT.  renitiation of RT  inpatient when patient able to tolerate. sats 95% on trach collar 10/ fio2 60% . completed azithromycin.  started on Zosyn for possible aspiration  repeat CX ray-in the inferior hemithorax on the left side consistent with airspace consolidation/volume loss in the left lower lobe is again appreciated, but the lung zones are essentially stable since the prior exam and demonstrate no new areas of airspace consolidation or volume loss. respiratory culture 4/19 with pansensitive  pseudomonas.     4/23 intermittently refusing bolus GT feedings.stable on 10L/60% Fio2 . completed radiation sessions. off solumedrol  today. PT recs SNF  4/24 K and P replaced   4/25 sodium 150. started on D5W . repeat renal panel in PM . oxygen tapered to 8L/ Fio2 35%   4/26 K of 3.2  replaced   4/27 stable on 8L/ Fio2 35% . mucous plug remvove by suction , wants glycopyrrolate PRN due to dry mouth   Interval History:   4/28 - has trach, G tube, NPO, completed radiation treatments, 92% on 8 liters,+ stool and urinations. Weaning oxygen. Wants to go home w HH. Discussed his care and condition with his wife.    4/29 - still requiring 8 liters per NC. Sats low 90s. I lowered  oxygen to 5 liters while in the room and he tolerated it.  Keep weaning.   4/30 - back up to 8 liters per NC, refused some Bolus TFs yesterday  5/1 - not able to wean oxygen.  He may need a LTAC for HF oxygen. Otherwise, progressing,. Sat up in chair yesterday, + urinations and BM.   5/2 - on 8 liters per NC this am. 92%.  His home goal is 5 liters. Pt tends to need more at night.  Plan is to keep him overnight on 5 liters to make sure he is safe for home oxygen.  He may benefit from LTAC a few days. Accepted to LTAC, waiting on ins auth.  Needs PT/OT for deconditioning.  5/3 -  8 liters, up in chair a am, monitor % meals, on nebs q 4 hours. PT/OT, trach care and suctioning. LTAC referrals sent.   5/4 - nurses unable to keep flow rate below 8 overnight, despite coaching. His volume status is stable. Repeat cxr. - Interval development of increased opacification within the right lung base, may reflect developing infiltrate versus atelectasis.  Small bilateral pleural effusions, increased on the left as compared to prior.  5/5  past week not able to wean oxygen.   sats 93 % on 8L NC/ fio2 35%. tapered to 5L/28% needs LTAC for HF oxygen.  repeated a cxray 5/4 - Interval development of increased opacification within the right lung base, may reflect developing infiltrate versus atelectasis. started antibiotics- vanc and zosyn.  Consider pulmonary eval .  just completed  course of zosyn 4/29 5/6 tolerated 6L NC/ fio2 35% overnight . peer to peer with insurane today. EKG with sinus tachycardia   discussed [facility] pulmonology. monitor off antibiotics zosyn and vancomcyn as he completed adequate course of antibiotics for pseudomas and aspiration. CX ray improvement may lag behind treatment  5/7 stable on 5L/ fio2 28% overnight .completed peer to peer with insurance today . awaiting decision   5/9  stable on 5L/ fio2 28%. denied LTAC,. discussed with wife at the bedside. she wants PT/OT and SLP eval - to work with patient  "consistently . prefers rehab as second option  5/10 able to tolerate PMSV but aspiration with swallow trials. NPO for  now. K of 5.2 repeat renal panel WNL   PT/OT to work with patient on regular basis. ambulated with PT today. PT recs rehab   5/11 awaiting  [facility] rehab acceptance              * No surgery found *      Hospital Course:   5/12 - witing rehab acceptance, NPO due to dyshpagia and ST following, 95% , 4 liters has trach.  Pt has poor truncal control and needs ambulance for transportation.  Pt was approved for rehab. Will dc today.        Goals of Care Treatment Preferences:  Code Status: Full Code      Consults:   Consults (From admission, onward)        Status Ordering Provider     Inpatient consult to Physical Medicine Rehab  Once        Provider:  (Not yet assigned)    Completed YAZMIN ZARAGOZA     Pharmacy to dose Vancomycin consult  Once        Provider:  (Not yet assigned)   "And" Linked Group Details    Completed JOHN HART     Inpatient consult to Registered Dietitian/Nutritionist  Once        Provider:  (Not yet assigned)    Completed YAZMIN ZARAGOZA     Inpatient consult to Radiation Oncology  Once        Provider:  (Not yet assigned)    Completed ТАТЬЯНА MILLIGAN     Inpatient consult to Registered Dietitian/Nutritionist  Once        Provider:  (Not yet assigned)    Completed ТАТЬЯНА MILLIGAN     Inpatient consult to Critical Care Medicine  Once        Provider:  (Not yet assigned)    Completed STEPHANIA BENITEZ.          * Acute on chronic respiratory failure  Pt with SCC of the tongue s/p total glossectomy, chemowith cisplatin, and nearing completion of radiation with trach, COPD, asthma, HTN, CAD s/p PCI presenting for acute on chronic hypoxemic respiratory failure. He is on 5L with trach collar at home. He has had increased yellow secretions, work of breathing and SOB for the past several days. BNP elevated to 1600 on admission with pulmonary edema and new cardiomegaly on CXR. " Trop wnl. Bicarb is elevated suggesting chronic hypercapnia. Suctioning has cleared thick mucous. He was able to be weaned down to his home O2, however after ~ 20 minutes he desats again, requiring deep suction with resolution of hypoxia. Now stable on home O2. Diuresed -3L     -Pt stable on home O2 for 24 hours (5 liters per NC), ready to stepdown to floor  - CAP coverage with ceftriaxone/azithro  - Methylprednisolone 40 mg x5 days for possible element of COPD exacerbation  - Duonebs q4  - Hypertonic saline nebs  - Chest PT  - Suction PRN    4/21 Transfer to hospital medicine . 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  initially requiring vent but has now been stable on home trach collar for 24 hours. sats 93% on 8L via trach collar.  Awaiting cultures from sputum, on CAP coverage with ceftriaxone. completed Azithromycin. continue solumedrol 40mg IV daily.   4/22 sats 95% on trach collar 10/ fio2 60% . completed azithromycin.   started on Zosyn for possible aspiration  repeat CX ray-in the inferior hemithorax on the left side consistent with airspace consolidation/volume loss in the left lower lobe is again appreciated, but the lung zones are essentially stable since the prior exam and demonstrate no new areas of airspace consolidation or volume loss.  respiratory culture 4/19 with pseudomonas.     4/30-  oxygen tapered to 8L/ Fio2 35% .  Needs to be at 5 liters to go home. Add guaifenisen for cough and secertion. Up in chair as much as possible  5/1 - not able to wean oxygen.  He may need a LTAC for HF oxygen.   5/3 - on 8 liters NC ( home goal is 5 liters)  5/4 - Now chronic, repeat cxr shows RLL pneumonia. Resume vanc , zosyn. Consult pulm  Up in chair helps to lower rate. He desats at night. This pt likely needs a LTAC for HF oxygen. Insurance company did not call me for a peer to peer  yesterday.    5/5   sats 93 % on 8L NC/ fio2 35%. tapered to 5L/28% needs LTAC for HF oxygen.  repeated a cxray 5/4 - Interval development of increased opacification within the right lung base, may reflect developing infiltrate versus atelectasis. started antibiotics- vanc and zosyn. monitor for vancomycin toxity. plan to switch to doxycycline. Consider pulmonary eval .  just completed  course of zosyn 4/29   5/6 tolerated 6L NC/ fio2 35% overnight . peer to peer with insurance today. EKG with sinus tachycardia. discussed [facility] pulmonology. monitor off antibiotics zosyn and vancomcyn as he completed adequate course of antibiotics for pseudomas and aspiration. CX ray improvement may lag behind treatment   5/12- stable on 5L/ fio2 28% overnight.  Rehab acceptance and ins approval pending    Aspiration pneumonia  4/22 sats 95% on trach collar 10/ fio2 60% . completed azithromycin.   started on Zosyn for possible aspiration  repeat CX ray-in the inferior hemithorax on the left side consistent with airspace consolidation/volume loss in the left lower lobe is again appreciated, but the lung zones are essentially stable since the prior exam and demonstrate no new areas of airspace consolidation or volume loss.  respiratory culture 4/19 with pseudomonas.   -  completed zosyn  5/4 - new infiltrate RLL. Resume zosyn, vanc, consult pulm   5/12 - weaning to baseline oxygen, on 4 liters    Squamous cell cancer of tongue    12/15/21 FNA left neck mass : squamous cell carcinoma, p16 negative  1/4/22 total glossectomy, bilateral neck dissection, bilateral cervical facial advancement flaps and anterolateral thigh free flap reconstruction of his glossectomy defect.  Final path :  [id number] (+microscopic SALINAS, single contralateral node), superior soft tissue margin of left neck with tumor.  2/28/22 start chemoradiation  Finished chemo with cisplatin 4/6. Was going to complete final dose of radiation tomorrow.     -Rad/onc consulted for  radiation while inpatient  4/22  s/p radiation oncology eval -on adjuvant chemoradiation, completed 31/33 fractions of RT.  renitiation of RT  inpatient when patient able to tolerate.    4/23  completed radiation sessions  5/4 -attempting to  wean oxygen, takes 5 liters at home and has all supplies.   5/12 - stable    Dysphagia  Nutrition consulted. Most recent weight and BMI monitored-          Malnutrition (Moderate to Severe)  Weight Loss (Malnutrition): greater than 10% in 6 months                 Measurements:  Wt Readings from Last 1 Encounters:   05/11/22 62.4 kg (137 lb 9.1 oz)   Body mass index is 20.92 kg/m².    Recommendations: Recommendation/Intervention: 1.  Goals: Meet % EEN, EPN by RD f/u date    Patient has been screened and assessed by RD. RD will follow patient.    4/21 secondary to above. on G tube feedings   4/22 changed to bolus GT feedings.   4/29 - pt noted to sometimes refuse TFs. , on bolus TFs, no water added      Hypernatremia  4/25 sodium 150. started on D5W . repeat renal panel in PM   5/4- sodium trended down to 145 -> 140, on TFs -> 138 -> 139, not receiving extra water in TFs -> 141  5/12 - stable    Hypophosphatemia  Replaced  5/12- stable      Hypokalemia  replaced   5/12 - stable      Chronic respiratory failure with hypoxia, on home O2 therapy  secondary to COPD. on 5L oxygen  at home  5/4  - continue weaning efforts, currently on 8 liters  5/5  past week not able to wean oxygen.   sats 93 % on 8L NC/ fio2 35%. needs LTAC for HF oxygen.  repeated a cxray 5/4 - Interval development of increased opacification within the right lung base, may reflect developing infiltrate versus atelectasis. started antibiotics- vanc and zosyn.  Consulted pulmonary.  just completed  course of zosyn 4/29 5/12 - on baseline oxygen, 4-5 liters per trache    Protein-calorie malnutrition  Nutrition consulted. Most recent weight and BMI monitored-          Malnutrition (Moderate to Severe)  Weight  Loss (Malnutrition): greater than 10% in 6 months              Measurements:  Wt Readings from Last 1 Encounters:   05/11/22 62.4 kg (137 lb 9.1 oz)   Body mass index is 20.92 kg/m².    Recommendations: Recommendation/Intervention: 1.  Goals: Meet % EEN, EPN by RD f/u date    Patient has been screened and assessed by RD. RD will follow patient.  See above      Normocytic anemia    Patient's with Normocytic anemia.. Hemoglobin stable. Etiology likely due to chronic disease .  Current CBC reviewed-    Recent Labs   Lab 05/10/22  0337 05/11/22  0412 05/12/22  0343   HGB 9.3* 9.5* 9.1*     Monitor CBC and transfuse if H/H drops below 7/21.   - improving      Other hyperlipidemia  Continue home statin         Primary hypertension  Chronic, controlled.  Latest blood pressure and vitals reviewed-   Temp:  [97.8 °F (36.6 °C)-98.5 °F (36.9 °C)]   Pulse:  []   Resp:  [18-29]   BP: (101-137)/(67-81)   SpO2:  [93 %-98 %] .   Home meds for hypertension were reviewed- amlodipine, losartan and metoprolol  held for now  PRN meds if BP> 180/110 mm HG  5/4 - not requiring scheduled meds      Coronary artery disease involving native coronary artery of native heart without angina pectoris  Continue ASA, plavix, and statin   -stable    Goals of care, counseling/discussion  Advance Care Planning      Does patient have Capacity? Yes  Contact: Bridgett mitchell, wife  Goals/Wishes: wants to go home, HH w Home PT  Code Status- FULL  Pain management- seems comfortable without pain  Prognosis- s/p radiation, progressing cancer, high risk for aspiration, severe malnutrition  Family discussions-  4/28, 4/29 - discussed care and condition w pt and his wife.  He can talk w trach collar but sats drop.  He wants to go home. Has all oxygen supplies at home. Pt completed zosyn.   5/2 - spoke to his wife. Reviewed his care and condition. She was able to calm him down. She is encouraging him. Will start remeron for appetite and depression. She is  working hard to keep him comfortable and well-cared for.  Functional Status - has trach and peg. recommended outpt f/u palliative care    Post acute care plan: pt would benefit from outpt referral to Palliative care, plan for HH and home.   5/12 - oxygen weaned witing for rehab acceptance and ins appoval      Final Active Diagnoses:    Diagnosis Date Noted POA    PRINCIPAL PROBLEM:  Acute on chronic respiratory failure [J96.20] 04/19/2022 Yes    Aspiration pneumonia [J69.0] 04/22/2022 Yes    Squamous cell cancer of tongue [C02.9] 12/16/2021 Yes    Dysphagia [R13.10] 04/21/2022 Yes    Hypernatremia [E87.0] 04/25/2022 Yes    Hypophosphatemia [E83.39] 04/24/2022 Yes    Hypokalemia [E87.6] 04/21/2022 Yes    Chronic respiratory failure with hypoxia, on home O2 therapy [J96.11, Z99.81] 03/14/2022 Not Applicable    Protein-calorie malnutrition [E46] 02/16/2022 Yes    Normocytic anemia [D64.9] 02/16/2022 Yes    Coronary artery disease involving native coronary artery of native heart without angina pectoris [I25.10] 12/29/2021 Yes    Primary hypertension [I10] 12/29/2021 Yes    Other hyperlipidemia [E78.49] 12/29/2021 Yes    Goals of care, counseling/discussion [Z71.89] 04/28/2022 Not Applicable    Impaired mobility and ADLs [Z74.09, Z78.9] 01/18/2022 Yes      Problems Resolved During this Admission:       Discharged Condition: good    Disposition: Rehab Facility    Follow Up:    Patient Instructions:      Ambulatory referral/consult to [facility]   Standing Status: Future   Referral Priority: Routine Referral Type: Consultation   Referral Reason: Specialty Services Required   Number of Visits Requested: 1       Significant Diagnostic Studies: Labs:   CMP   Recent Labs   Lab 05/11/22  0412 05/12/22  0343    135*   K 5.0 4.4    98   CO2 30* 31*    110   BUN 26* 24*   CREATININE 0.7 0.7   CALCIUM 9.7 9.1   PROT 6.6 6.3   ALBUMIN 2.7* 2.6*   BILITOT 0.2 0.2    ALKPHOS 77 75   AST 14 14   ALT 10 10   ANIONGAP 9 6*   ESTGFRAFRICA >60.0 >60.0   EGFRNONAA >60.0 >60.0    and CBC   Recent Labs   Lab 05/11/22  0412 05/12/22  0343   WBC 7.18 8.36   HGB 9.5* 9.1*   HCT 31.3* 29.5*    225       Pending Diagnostic Studies:     Procedure Component Value Units Date/Time    Basic Metabolic Panel [[id number]] Collected: 04/19/22 1804    Order Status: Sent Lab Status: In process Updated: 04/19/22 1805    Specimen: Blood          Medications:  Reconciled Home Medications:      Medication List      START taking these medications    scopolamine 1.3-1.5 mg (1 mg over 3 days)  Commonly known as: TRANSDERM-SCOP  Place 1 patch onto the skin Every 3 (three) days.  Start taking on: May 13, 2022        CONTINUE taking these medications    acetaminophen 325 MG tablet  Commonly known as: TYLENOL  2 tablets (650 mg total) by Per G Tube route every 6 (six) hours.     albuterol-ipratropium 2.5 mg-0.5 mg/3 mL nebulizer solution  Commonly known as: DUO-NEB  Take 3 mLs by nebulization every 4 (four) hours as needed for Wheezing. Rescue     aspirin 81 MG Chew  1 tablet (81 mg total) by Per G Tube route once daily.     atorvastatin 20 MG tablet  Commonly known as: LIPITOR  1 tablet (20 mg total) by Per G Tube route once daily.     bisacodyL 10 mg Supp  Commonly known as: DULCOLAX  Place 1 suppository (10 mg total) rectally daily as needed.     clopidogreL 75 mg tablet  Commonly known as: PLAVIX  1 tablet (75 mg total) by Per G Tube route once daily.     enoxaparin 40 mg/0.4 mL Syrg  Commonly known as: LOVENOX  Inject 0.4 mLs (40 mg total) into the skin once daily.     folic acid 1 MG tablet  Commonly known as: FOLVITE  1 tablet (1 mg total) by Per G Tube route once daily.     glycopyrrolate 1 mg/5 mL (0.2 mg/mL) Soln  Commonly known as: CUVPOSA  Take 5 mLs (1 mg total) by mouth 3 (three) times daily as needed (TO REDUCE SECRETIONS).     guaiFENesin 200 mg/5 mL Liqd  Take 400 mg by mouth every 4  (four) hours as needed (for secretions).     melatonin 3 mg tablet  Commonly known as: MELATIN  2 tablets (6 mg total) by Per G Tube route nightly.     ondansetron 8 MG Tbdl  Commonly known as: ZOFRAN-ODT  Take 1 tablet (8 mg total) by mouth every 8 (eight) hours as needed (nausea).     oxyCODONE 5 mg/5 mL Soln  Commonly known as: ROXICODONE  5 mLs (5 mg total) by Per G Tube route every 4 (four) hours as needed (Pain).     polyethylene glycol 17 gram Pwpk  Commonly known as: GLYCOLAX  17 g by Per G Tube route 2 (two) times daily.     sodium chloride 0.65 % nasal spray  Commonly known as: OCEAN  1 spray by Nasal route as needed for Congestion.     thiamine 100 MG tablet  1 tablet (100 mg total) by Per G Tube route once daily.        STOP taking these medications    amLODIPine 10 MG tablet  Commonly known as: NORVASC     DUKE'S SOLUTION (BENADRYL 30 ML, MYLANTA 30 ML, LIDOCAINE 30 ML, NYSTATIN 30 ML)     losartan 50 MG tablet  Commonly known as: COZAAR     metoprolol tartrate 100 MG tablet  Commonly known as: LOPRESSOR     senna-docusate 8.6-50 mg 8.6-50 mg per tablet  Commonly known as: PERICOLACE     sodium chloride 3% 3 % nebulizer solution            Indwelling Lines/Drains at time of discharge:   Lines/Drains/Airways     Drain  Duration                Gastrostomy/Enterostomy 01/04/22 Percutaneous endoscopic gastrostomy (PEG)  days          Airway  Duration                Surgical Airway 04/25/22 1014 Uncuffed 17 days                Time spent on the discharge of patient: 35 minutes         Pam Bush MD  Department of Hospital Medicine  [facility] - Telemetry Stepdown

## 2022-05-12 NOTE — PROGRESS NOTES
Report given to originally began to be given to Yudy Acosta then taken over  by Miriam ACOSTA at [facility]

## 2022-05-12 NOTE — ASSESSMENT & PLAN NOTE
4/22 sats 95% on trach collar 10/ fio2 60% . completed azithromycin.   started on Zosyn for possible aspiration  repeat CX ray-in the inferior hemithorax on the left side consistent with airspace consolidation/volume loss in the left lower lobe is again appreciated, but the lung zones are essentially stable since the prior exam and demonstrate no new areas of airspace consolidation or volume loss.  respiratory culture 4/19 with pseudomonas.   -  completed zosyn  5/4 - new infiltrate RLL. Resume zosyn, vanc, consult pulm   5/12 - weaning to baseline oxygen, on 4 liters

## 2022-05-12 NOTE — PLAN OF CARE
05/12/2022      Fareed Richard Jr.  [address] [id number]          [facility] Medicine Dept.  [facility] [address] [place] [address]  [phone] after hours  [phone] fax Diagnosis:  Acute on chronic respiratory failure                         Debility      Pt has poor truncal control and needs ambulance for transportation.           _________________________  Pam Bush MD  05/12/2022

## 2022-05-12 NOTE — PLAN OF CARE
DANIELE spoke to Shira cheek/ [facility] [phone] in reference to referral.  Shira advised they are able to admit pt today.  DANIELE will send updated orders.      DANIELE spoke to Kirsten with [facility] and was advised insurance auth issued for inpt rehab to [facility].  Kirsten will send information to [facility].      DANIELE spoke to pt's wife, Bridgett [phone], and provided an updated status on insurance auth and pt's transfer to [facility] today.      11:12 AM  Request for ambulance auth faxed to [facility] [phone].      Milady Thomson LMSW  PRN-  [facility]  Ext. [phone]

## 2022-05-12 NOTE — PROGRESS NOTES
[facility] Cherry - Telemetry [facility] Medicine  Progress Note    Patient Name: Fareed Richard Jr.  MRN: [id number]  Patient Class: IP- Inpatient   Admission Date: 4/19/2022  Length of Stay: 23 days  Attending Physician: Pam Bush MD  Primary Care Provider: Kodi Tubbs MD        Subjective:     Principal Problem:Acute on chronic respiratory failure        HPI:  Mr. 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 Per wife he has had several days of increasing lethargy, increased work of breathing and secretions. Yesterday she became particularly concerned when he became slightly less responsive and interactive. Wife also notes increased yellow thick secretions from trach site. Upon EMS arrival he was on his home 5 L by trach collar and saturating 93% with significant wheezing.  He received 1 DuoNeb by them.  He was also suctioned.     Currently patient is alert, following commands.  He is responding to yes no questions.  He denies any chest pain but does endorse shortness of breath and cough.  He denies any abdominal pain or pain elsewhere       ED course:   He was given 500ccs IVF as well as one time doses of vanc/cefepime/azithro. Labs significant for BNP of 1600, WBC 5k, K 5.6, Bicarb 35, VBG 7.26/87/25/40. Trop wnl. CXR with new cardiomegaly and pulmonary edema. Suctioned with return of copious secretions. ICU was consulted for acute hypoxemic respiratory failure and he will be admitted for further management.    Interval History:     Admitted to ICU, started on CAP coverage and solumedrol. Quickly weaned off vent after return of copious secretions on suction. Has been maintaining sats well on home O2. Rad/onc consulted for completion of radiology while inpatient. Pending culture sensitivity.    4/21 Transfer to hospital medicine . Squamous  cell carcinoma  of the tongue s/p glossectomy and flap w/tracheostomy, COPD, and HTN admitted to ICU for managemenet of acute hypercapnic respiratory failure.  initially requiring vent but has now been stable on home trach collar for 24 hours. sats 93% on 8L via trach collar.  Awaiting cultures from sputum, on CAP coverage with ceftriaxone. completed Azithromycin. continue solumedrol 40mg IV daily.  rad/onc consulted this morning-he was supposed to complete radiation outpt but was admitted, attempting to set up for inpatient  4/22 changed to bolus GT feedings. s/p radiation oncology eval -on adjuvant chemoradiation, completed 31/33 fractions of RT.  renitiation of RT  inpatient when patient able to tolerate. sats 95% on trach collar 10/ fio2 60% . completed azithromycin.  started on Zosyn for possible aspiration  repeat CX ray-in the inferior hemithorax on the left side consistent with airspace consolidation/volume loss in the left lower lobe is again appreciated, but the lung zones are essentially stable since the prior exam and demonstrate no new areas of airspace consolidation or volume loss. respiratory culture 4/19 with pansensitive  pseudomonas.     4/23 intermittently refusing bolus GT feedings.stable on 10L/60% Fio2 . completed radiation sessions. off solumedrol  today. PT recs SNF  4/24 K and P replaced   4/25 sodium 150. started on D5W . repeat renal panel in PM . oxygen tapered to 8L/ Fio2 35%   4/26 K of 3.2  replaced   4/27 stable on 8L/ Fio2 35% . mucous plug remvove by suction , wants glycopyrrolate PRN due to dry mouth   Interval History:   4/28 - has trach, G tube, NPO, completed radiation treatments, 92% on 8 liters,+ stool and urinations. Weaning oxygen. Wants to go home w HH. Discussed his care and condition with his wife.    4/29 - still requiring 8 liters per NC. Sats low 90s. I lowered oxygen to 5 liters while in the room and he tolerated it.  Keep weaning.   4/30 - back up to 8 liters per NC,  refused some Bolus TFs yesterday  5/1 - not able to wean oxygen.  He may need a LTAC for HF oxygen. Otherwise, progressing,. Sat up in chair yesterday, + urinations and BM.   5/2 - on 8 liters per NC this am. 92%.  His home goal is 5 liters. Pt tends to need more at night.  Plan is to keep him overnight on 5 liters to make sure he is safe for home oxygen.  He may benefit from LTAC a few days. Accepted to LTAC, waiting on ins auth.  Needs PT/OT for deconditioning.  5/3 -  8 liters, up in chair a am, monitor % meals, on nebs q 4 hours. PT/OT, trach care and suctioning. LTAC referrals sent.   5/4 - nurses unable to keep flow rate below 8 overnight, despite coaching. His volume status is stable. Repeat cxr. - Interval development of increased opacification within the right lung base, may reflect developing infiltrate versus atelectasis.  Small bilateral pleural effusions, increased on the left as compared to prior.  5/5  past week not able to wean oxygen.   sats 93 % on 8L NC/ fio2 35%. tapered to 5L/28% needs LTAC for HF oxygen.  repeated a cxray 5/4 - Interval development of increased opacification within the right lung base, may reflect developing infiltrate versus atelectasis. started antibiotics- vanc and zosyn.  Consider pulmonary eval .  just completed  course of zosyn 4/29 5/6 tolerated 6L NC/ fio2 35% overnight . peer to peer with insurane today. EKG with sinus tachycardia   discussed [facility] pulmonology. monitor off antibiotics zosyn and vancomcyn as he completed adequate course of antibiotics for pseudomas and aspiration. CX ray improvement may lag behind treatment  5/7 stable on 5L/ fio2 28% overnight .completed peer to peer with insurance today . awaiting decision   5/9  stable on 5L/ fio2 28%. denied LTAC,. discussed with wife at the bedside. she wants PT/OT and SLP eval - to work with patient consistently . prefers rehab as second option  5/10 able to tolerate PMSV but aspiration with swallow trials. NPO  for  now. K of 5.2 repeat renal panel WNL   PT/OT to work with patient on regular basis. ambulated with PT today. PT recs rehab   5/11 awaiting  [facility] re[facility] acceptance              Overview/Hospital Course:  5/12 - [facility] re[facility] acceptance, NPO due to dyshpagia and ST following, 95% , 4 liters has trach.       Interval History: see above    Review of Systems   Constitutional:  Negative for activity change, appetite change, chills, fatigue and fever.        Trach and PEG  Generalized weakness   HENT:  Negative for congestion, facial swelling, hearing loss, nosebleeds, sore throat and trouble swallowing.         Denies pain   Eyes:  Negative for pain and redness.   Respiratory:  Negative for apnea, cough, choking, chest tightness, shortness of breath, wheezing and stridor.    Cardiovascular:  Negative for chest pain, palpitations and leg swelling.   Gastrointestinal:  Negative for abdominal distention, abdominal pain, blood in stool, constipation, diarrhea, nausea and vomiting.   Genitourinary:  Negative for difficulty urinating, dysuria, flank pain, hematuria and urgency.   Musculoskeletal:  Negative for arthralgias, back pain, gait problem, neck pain and neck stiffness.   Skin:  Negative for color change, pallor and rash.   Neurological:  Negative for dizziness, tremors, syncope, facial asymmetry, weakness, light-headedness, numbness and headaches.   Psychiatric/Behavioral:  Negative for agitation, behavioral problems, confusion, decreased concentration, hallucinations, self-injury and sleep disturbance. The patient is not nervous/anxious and is not hyperactive.    Objective:     Vital Signs (Most Recent):  Temp: 97.9 °F (36.6 °C) (05/12/22 0741)  Pulse: 100 (05/12/22 0804)  Resp: 20 (05/12/22 0804)  BP: 101/79 (05/12/22 0741)  SpO2: 95 % (05/12/22 0804)   Vital Signs (24h Range):  Temp:  [97.8 °F (36.6 °C)-98.5 °F (36.9 °C)] 97.9 °F (36.6 °C)  Pulse:  [] 100  Resp:  [18-29] 20  SpO2:  [93 %-98 %] 95 %  BP:  (101-137)/(67-81) 101/79     Weight: 62.4 kg (137 lb 9.1 oz)  Body mass index is 20.92 kg/m².    Intake/Output Summary (Last 24 hours) at 5/12/2022 0937  Last data filed at 5/12/2022 0600  Gross per 24 hour   Intake 900 ml   Output 550 ml   Net 350 ml        Physical Exam  Constitutional:       General: He is not in acute distress.     Appearance: Normal appearance. He is not ill-appearing or diaphoretic.      Comments: Frail elderly, trach and PEG   HENT:      Head: Normocephalic and atraumatic.      Comments: Thin in the face     Nose: Nose normal.      Mouth/Throat:      Mouth: Mucous membranes are moist.      Pharynx: Oropharynx is clear.   Eyes:      General: No scleral icterus.     Extraocular Movements: Extraocular movements intact.      Conjunctiva/sclera: Conjunctivae normal.      Pupils: Pupils are equal, round, and reactive to light.   Cardiovascular:      Rate and Rhythm: Normal rate and regular rhythm.      Pulses: Normal pulses.      Heart sounds: Normal heart sounds.   Pulmonary:      Effort: Pulmonary effort is normal. No respiratory distress.      Breath sounds: Rales (LEFT LUNG BASE) present. No wheezing or rhonchi.   Abdominal:      General: Abdomen is flat. Bowel sounds are normal. There is no distension.      Palpations: Abdomen is soft.      Tenderness: There is no abdominal tenderness. There is no right CVA tenderness or guarding.   Musculoskeletal:         General: No swelling, tenderness or deformity. Normal range of motion.      Cervical back: Normal range of motion and neck supple. No rigidity or tenderness.      Comments: atrophy   Skin:     General: Skin is warm and dry.      Coloration: Skin is not jaundiced.      Findings: No rash.   Neurological:      General: No focal deficit present.      Mental Status: He is alert and oriented to person, place, and time. Mental status is at baseline.      Motor: No weakness.   Psychiatric:         Mood and Affect: Mood normal.         Behavior:  Behavior normal.         Thought Content: Thought content normal.         Judgment: Judgment normal.       Significant Labs: All pertinent labs within the past 24 hours have been reviewed.  CBC:   Recent Labs   Lab 05/11/22  0412 05/12/22  0343   WBC 7.18 8.36   HGB 9.5* 9.1*   HCT 31.3* 29.5*    225       CMP:   Recent Labs   Lab 05/10/22  1031 05/11/22  0412 05/12/22  0343    139 135*   K 4.6 5.0 4.4    100 98   CO2 33* 30* 31*   * 103 110   BUN 22 26* 24*   CREATININE 0.8 0.7 0.7   CALCIUM 9.7 9.7 9.1   PROT  --  6.6 6.3   ALBUMIN 2.7* 2.7* 2.6*   BILITOT  --  0.2 0.2   ALKPHOS  --  77 75   AST  --  14 14   ALT  --  10 10   ANIONGAP 7* 9 6*   EGFRNONAA >60.0 >60.0 >60.0         Significant Imaging: I have reviewed all pertinent imaging results/findings within the past 24 hours.      Assessment/Plan:      * Acute on chronic respiratory failure  Pt with SCC of the tongue s/p total glossectomy, chemowith cisplatin, and nearing completion of radiation with trach, COPD, asthma, HTN, CAD s/p PCI presenting for acute on chronic hypoxemic respiratory failure. He is on 5L with trach collar at home. He has had increased yellow secretions, work of breathing and SOB for the past several days. BNP elevated to 1600 on admission with pulmonary edema and new cardiomegaly on CXR. Trop wnl. Bicarb is elevated suggesting chronic hypercapnia. Suctioning has cleared thick mucous. He was able to be weaned down to his home O2, however after ~ 20 minutes he desats again, requiring deep suction with resolution of hypoxia. Now stable on home O2. Diuresed -3L     -Pt stable on home O2 for 24 hours (5 liters per NC), ready to stepdown to floor  - CAP coverage with ceftriaxone/azithro  - Methylprednisolone 40 mg x5 days for possible element of COPD exacerbation  - Duonebs q4  - Hypertonic saline nebs  - Chest PT  - Suction PRN    4/21 Transfer to hospital medicine . Squamous cell carcinoma  of the tongue s/p  glossectomy and flap w/tracheostomy, COPD, and HTN admitted to ICU for managemenet of acute hypercapnic respiratory failure.  initially requiring vent but has now been stable on home trach collar for 24 hours. sats 93% on 8L via trach collar.  Awaiting cultures from sputum, on CAP coverage with ceftriaxone. completed Azithromycin. continue solumedrol 40mg IV daily.   4/22 sats 95% on trach collar 10/ fio2 60% . completed azithromycin.   started on Zosyn for possible aspiration  repeat CX ray-in the inferior hemithorax on the left side consistent with airspace consolidation/volume loss in the left lower lobe is again appreciated, but the lung zones are essentially stable since the prior exam and demonstrate no new areas of airspace consolidation or volume loss.  respiratory culture 4/19 with pseudomonas.     4/30-  oxygen tapered to 8L/ Fio2 35% .  Needs to be at 5 liters to go home. Add guaifenisen for cough and secertion. Up in chair as much as possible  5/1 - not able to wean oxygen.  He may need a LTAC for HF oxygen.   5/3 - on 8 liters NC ( home goal is 5 liters)  5/4 - Now chronic, repeat cxr shows RLL pneumonia. Resume vanc , zosyn. Consult pulm  Up in chair helps to lower rate. He desats at night. This pt likely needs a LTAC for HF oxygen. Insurance company did not call me for a peer to peer yesterday.    5/5   sats 93 % on 8L NC/ fio2 35%. tapered to 5L/28% needs LTAC for HF oxygen.  repeated a cxray 5/4 - Interval development of increased opacification within the right lung base, may reflect developing infiltrate versus atelectasis. started antibiotics- vanc and zosyn. monitor for vancomycin toxity. plan to switch to doxycycline. Consider pulmonary eval .  just completed  course of zosyn 4/29   5/6 tolerated 6L NC/ fio2 35% overnight . peer to peer with insurance today. EKG with sinus tachycardia. discussed [facility] pulmonology. monitor off antibiotics zosyn and vancomcyn as he completed adequate course of  antibiotics for pseudomas and aspiration. CX ray improvement may lag behind treatment   5/12- stable on 5L/ fio2 28% overnight.  Rehab acceptance and ins approval pending    Aspiration pneumonia  4/22 sats 95% on trach collar 10/ fio2 60% . completed azithromycin.   started on Zosyn for possible aspiration  repeat CX ray-in the inferior hemithorax on the left side consistent with airspace consolidation/volume loss in the left lower lobe is again appreciated, but the lung zones are essentially stable since the prior exam and demonstrate no new areas of airspace consolidation or volume loss.  respiratory culture 4/19 with pseudomonas.   -  completed zosyn  5/4 - new infiltrate RLL. Resume zosyn, vanc, consult pulm   5/12 - weaning to baseline oxygen, on 4 liters    Squamous cell cancer of tongue    12/15/21 FNA left neck mass : squamous cell carcinoma, p16 negative  1/4/22 total glossectomy, bilateral neck dissection, bilateral cervical facial advancement flaps and anterolateral thigh free flap reconstruction of his glossectomy defect.  Final path :  [id number] (+microscopic SALINAS, single contralateral node), superior soft tissue margin of left neck with tumor.  2/28/22 start chemoradiation  Finished chemo with cisplatin 4/6. Was going to complete final dose of radiation tomorrow.     -Rad/onc consulted for radiation while inpatient  4/22  s/p radiation oncology eval -on adjuvant chemoradiation, completed 31/33 fractions of RT.  renitiation of RT  inpatient when patient able to tolerate.    4/23  completed radiation sessions  5/4 -attempting to  wean oxygen, takes 5 liters at home and has all supplies.   5/12 - stable    Dysphagia  Nutrition consulted. Most recent weight and BMI monitored-          Malnutrition (Moderate to Severe)  Weight Loss (Malnutrition): greater than 10% in 6 months                 Measurements:  Wt Readings from Last 1 Encounters:   05/11/22 62.4 kg (137 lb 9.1 oz)   Body mass index is 20.92  kg/m².    Recommendations: Recommendation/Intervention: 1.  Goals: Meet % EEN, EPN by RD f/u date    Patient has been screened and assessed by RD. RD will follow patient.    4/21 secondary to above. on G tube feedings   4/22 changed to bolus GT feedings.   4/29 - pt noted to sometimes refuse TFs. , on bolus TFs, no water added      Hypernatremia  4/25 sodium 150. started on D5W . repeat renal panel in PM   5/4- sodium trended down to 145 -> 140, on TFs -> 138 -> 139, not receiving extra water in TFs -> 141  5/12 - stable    Hypophosphatemia  Replaced  5/12- stable      Hypokalemia  replaced   5/12 - stable      Chronic respiratory failure with hypoxia, on home O2 therapy  secondary to COPD. on 5L oxygen  at home  5/4  - continue weaning efforts, currently on 8 liters  5/5  past week not able to wean oxygen.   sats 93 % on 8L NC/ fio2 35%. needs LTAC for HF oxygen.  repeated a cxray 5/4 - Interval development of increased opacification within the right lung base, may reflect developing infiltrate versus atelectasis. started antibiotics- vanc and zosyn.  Consulted pulmonary.  just completed  course of zosyn 4/29 5/12 - on baseline oxygen, 4-5 liters per trache    Protein-calorie malnutrition  Nutrition consulted. Most recent weight and BMI monitored-          Malnutrition (Moderate to Severe)  Weight Loss (Malnutrition): greater than 10% in 6 months              Measurements:  Wt Readings from Last 1 Encounters:   05/11/22 62.4 kg (137 lb 9.1 oz)   Body mass index is 20.92 kg/m².    Recommendations: Recommendation/Intervention: 1.  Goals: Meet % EEN, EPN by RD f/u date    Patient has been screened and assessed by RD. RD will follow patient.  See above      Normocytic anemia    Patient's with Normocytic anemia.. Hemoglobin stable. Etiology likely due to chronic disease .  Current CBC reviewed-    Recent Labs   Lab 05/10/22  0337 05/11/22  0412 05/12/22  0343   HGB 9.3* 9.5* 9.1*     Monitor CBC and  transfuse if H/H drops below 7/21.   - improving      Other hyperlipidemia  Continue home statin         Primary hypertension  Chronic, controlled.  Latest blood pressure and vitals reviewed-   Temp:  [97.8 °F (36.6 °C)-98.5 °F (36.9 °C)]   Pulse:  []   Resp:  [18-29]   BP: (101-137)/(67-81)   SpO2:  [93 %-98 %] .   Home meds for hypertension were reviewed- amlodipine, losartan and metoprolol  held for now  PRN meds if BP> 180/110 mm HG  5/4 - not requiring scheduled meds      Coronary artery disease involving native coronary artery of native heart without angina pectoris  Continue ASA, plavix, and statin   -stable    Goals of care, counseling/discussion  Advance Care Planning      Does patient have Capacity? Yes  Contact: Bridgett mitchell, wife  Goals/Wishes: wants to go home, HH w Home PT  Code Status- FULL  Pain management- seems comfortable without pain  Prognosis- s/p radiation, progressing cancer, high risk for aspiration, severe malnutrition  Family discussions-  4/28, 4/29 - discussed care and condition w pt and his wife.  He can talk w trach collar but sats drop.  He wants to go home. Has all oxygen supplies at home. Pt completed zosyn.   5/2 - spoke to his wife. Reviewed his care and condition. She was able to calm him down. She is encouraging him. Will start remeron for appetite and depression. She is working hard to keep him comfortable and well-cared for.  Functional Status - has trach and peg. recommended outpt f/u palliative care    Post acute care plan: pt would benefit from outpt referral to Palliative care, plan for HH and home.   5/12 - oxygen weaned witing for rehab acceptance and ins appoval      Sinus tachycardia          VTE Risk Mitigation (From admission, onward)         Ordered     enoxaparin injection 40 mg  Daily         04/19/22 1123     IP VTE HIGH RISK PATIENT  Once         04/19/22 1123     Place sequential compression device  Until discontinued         04/19/22 1123                 Discharge Planning   NISA: 5/12/2022     Code Status: Full Code   Is the patient medically ready for discharge?: Yes    Reason for patient still in hospital (select all that apply): Patient trending condition  Discharge Plan A: Rehab   Discharge Delays: None known at this time      Pam Bush MD  Department of Hospital Medicine   [facility] - Telemetry Stepdown

## 2022-05-12 NOTE — ASSESSMENT & PLAN NOTE
Nutrition consulted. Most recent weight and BMI monitored-          Malnutrition (Moderate to Severe)  Weight Loss (Malnutrition): greater than 10% in 6 months              Measurements:  Wt Readings from Last 1 Encounters:   05/11/22 62.4 kg (137 lb 9.1 oz)   Body mass index is 20.92 kg/m².    Recommendations: Recommendation/Intervention: 1.  Goals: Meet % EEN, EPN by RD f/u date    Patient has been screened and assessed by RD. RD will follow patient.  See above

## 2022-05-12 NOTE — PLAN OF CARE
[facility] - Telemetry Stepdown  Facility Transfer Orders        Admit to: Rehab    Diagnoses:   Active Hospital Problems    Diagnosis  POA    *Acute on chronic respiratory failure [J96.20]  Yes    Aspiration pneumonia [J69.0]  Yes     Priority: 4     Squamous cell cancer of tongue [C02.9]  Yes     Priority: 8      12/15/21 FNA left neck mass : squamous cell carcinoma, p16 negative  1/4/22 total glossectomy, bilateral neck dissection, bilateral cervical facial advancement flaps and anterolateral thigh free flap reconstruction of his glossectomy defect.  Final path :  [id number] (+microscopic SALINAS, single contralateral node), superior soft tissue margin of left neck with tumor.    2/28/22 start chemoradiation      Dysphagia [R13.10]  Yes     Priority: 9     Hypernatremia [E87.0]  Yes     Priority: 10     Hypophosphatemia [E83.39]  Yes     Priority: 10     Hypokalemia [E87.6]  Yes     Priority: 10     Chronic respiratory failure with hypoxia, on home O2 therapy [J96.11, Z99.81]  Not Applicable     Priority: 10     Protein-calorie malnutrition [E46]  Yes     Priority: 14     Normocytic anemia [D64.9]  Yes     Priority: 14     Coronary artery disease involving native coronary artery of native heart without angina pectoris [I25.10]  Yes     Priority: 14     Primary hypertension [I10]  Yes     Priority: 14     Other hyperlipidemia [E78.49]  Yes     Priority: 14     Goals of care, counseling/discussion [Z71.89]  Not Applicable     Priority: 15     Impaired mobility and ADLs [Z74.09, Z78.9]  Yes      Resolved Hospital Problems   No resolved problems to display.     Allergies: Review of patient's allergies indicates:  No Known Allergies    Code Status: FULL    Vitals: Routine       Diet: NPO     Tube feeding via PEG  isosource 1.5  One can q 4 hours, six can per day.   Water - no added, may use water to flush tube and push meds      Activity: Up in a chair each morning as tolerated    Nursing Precautions:  Aspiration , Fall and Pressure ulcer prevention    Bed/Surface: Low Air Loss    Consults: PT to evaluate and treat- 5 times a week, OT to evaluate and treat- 5 times a week and Wound Care    Oxygen: 5 liters/min via tracheostomy mask    Dialysis: Patient is not on dialysis.     For altered skin integrity with non-blanchable redness or partial thickness tissue loss.- Change foam dressing twice weekly: Remove current foam dressing, cleanse area with normal saline, apply silicone bordered foam dressing. Keep pressure off affected area.       Pending Diagnostic Studies:     Procedure Component Value Units Date/Time    Basic Metabolic Panel [[id number]] Collected: 04/19/22 1804    Order Status: Sent Lab Status: In process Updated: 04/19/22 1805    Specimen: Blood           Miscellaneous Care:   PEG Care:  Clean site every 24 hours  Routine Skin for Bedridden Patients:  Apply moisture barrier cream to all    Trach care     Medications: Discontinue all previous medication orders, if any. See new list below.  Current Discharge Medication List      START taking these medications    Details   scopolamine (TRANSDERM-SCOP) 1.3-1.5 mg (1 mg over 3 days) Place 1 patch onto the skin Every 3 (three) days.  Qty: 10 patch, Refills: 0         CONTINUE these medications which have NOT CHANGED    Details   atorvastatin (LIPITOR) 20 MG tablet 1 tablet (20 mg total) by Per G Tube route once daily.  Qty: 90 tablet, Refills: 3      glycopyrrolate (CUVPOSA) 1 mg/5 mL (0.2 mg/mL) Soln Take 5 mLs (1 mg total) by mouth 3 (three) times daily as needed (TO REDUCE SECRETIONS).  Qty: 473 mL, Refills: 1    Associated Diagnoses: Squamous cell cancer of tongue; Excessive oral secretions      acetaminophen (TYLENOL) 325 MG tablet 2 tablets (650 mg total) by Per G Tube route every 6 (six) hours.  Refills: 0      albuterol-ipratropium (DUO-NEB) 2.5 mg-0.5 mg/3 mL nebulizer solution Take 3 mLs by nebulization every 4 (four) hours as needed for Wheezing.  Rescue  Qty: 75 mL, Refills: 0      aspirin 81 MG Chew 1 tablet (81 mg total) by Per G Tube route once daily.  Refills: 0      bisacodyL (DULCOLAX) 10 mg Supp Place 1 suppository (10 mg total) rectally daily as needed.  Refills: 0      clopidogreL (PLAVIX) 75 mg tablet 1 tablet (75 mg total) by Per G Tube route once daily.  Qty: 30 tablet, Refills: 11      enoxaparin (LOVENOX) 40 mg/0.4 mL Syrg Inject 0.4 mLs (40 mg total) into the skin once daily.      folic acid (FOLVITE) 1 MG tablet 1 tablet (1 mg total) by Per G Tube route once daily.  Refills: 0      guaiFENesin 200 mg/5 mL Liqd Take 400 mg by mouth every 4 (four) hours as needed (for secretions).  Qty: 473 mL, Refills: 3    Associated Diagnoses: Squamous cell cancer of tongue      melatonin (MELATIN) 3 mg tablet 2 tablets (6 mg total) by Per G Tube route nightly.  Refills: 0      ondansetron (ZOFRAN-ODT) 8 MG TbDL Take 1 tablet (8 mg total) by mouth every 8 (eight) hours as needed (nausea).  Qty: 30 tablet, Refills: 1    Associated Diagnoses: Squamous cell cancer of tongue; Chemotherapy-induced nausea      oxyCODONE (ROXICODONE) 5 mg/5 mL Soln 5 mLs (5 mg total) by Per G Tube route every 4 (four) hours as needed (Pain).  Qty: 150 mL, Refills: 0    Comments: Quantity prescribed more than 7 day supply? No  Associated Diagnoses: Chronic respiratory failure with hypoxia, on home O2 therapy      polyethylene glycol (GLYCOLAX) 17 gram PwPk 17 g by Per G Tube route 2 (two) times daily.  Refills: 0      sodium chloride (OCEAN) 0.65 % nasal spray 1 spray by Nasal route as needed for Congestion.  Refills: 12      thiamine 100 MG tablet 1 tablet (100 mg total) by Per G Tube route once daily.         STOP taking these medications       amLODIPine (NORVASC) 10 MG tablet Comments:   Reason for Stopping:         duke's soln (benadryl 30 mL, mylanta 30 mL, LIDOcaine 30 mL, nystatin 30 mL) 120mL Comments:   Reason for Stopping:         losartan (COZAAR) 50 MG tablet Comments:    Reason for Stopping:         metoprolol tartrate (LOPRESSOR) 100 MG tablet Comments:   Reason for Stopping:         senna-docusate 8.6-50 mg (PERICOLACE) 8.6-50 mg per tablet Comments:   Reason for Stopping:         sodium chloride 3% 3 % nebulizer solution Comments:   Reason for Stopping:             Follow up:     Immunizations Administered as of 5/12/2022     Name Date Dose VIS Date Route Exp Date    COVID-19, MRNA, LN-S, PF (Moderna) 12/16/2021 0.25 mL -- Intramuscular --    Site: Right deltoid     Lot: [id number]     External: Hard Candy Cases Entered     COVID-19, MRNA, LN-S, PF (Moderna) 6/7/2021 0.5 mL -- Intramuscular --    Site: Right arm     Lot: [id number]     External: Hard Candy Cases Entered     COVID-19, MRNA, LN-S, PF (Moderna) 5/5/2021 0.5 mL -- Intramuscular --    Site: Right arm     Lot: [id number]     External: Conclusive Analyticst Entered           This patient has had both covid vaccinations    Some patients may experience side effects after vaccination.  These may include fever, headache, muscle or joint aches.  Most symptoms resolve with 24-48 hours and do not require urgent medical evaluation unless they persist for more than 72 hours or symptoms are concerning for an unrelated medical condition.          Pam Bush MD

## 2022-05-12 NOTE — ASSESSMENT & PLAN NOTE
12/15/21 FNA left neck mass : squamous cell carcinoma, p16 negative  1/4/22 total glossectomy, bilateral neck dissection, bilateral cervical facial advancement flaps and anterolateral thigh free flap reconstruction of his glossectomy defect.  Final path :  [id number] (+microscopic SALINAS, single contralateral node), superior soft tissue margin of left neck with tumor.  2/28/22 start chemoradiation  Finished chemo with cisplatin 4/6. Was going to complete final dose of radiation tomorrow.     -Rad/onc consulted for radiation while inpatient  4/22  s/p radiation oncology eval -on adjuvant chemoradiation, completed 31/33 fractions of RT.  renitiation of RT  inpatient when patient able to tolerate.    4/23  completed radiation sessions  5/4 -attempting to  wean oxygen, takes 5 liters at home and has all supplies.   5/12 - stable

## 2022-05-12 NOTE — SUBJECTIVE & OBJECTIVE
Interval History: see above    Review of Systems   Constitutional:  Negative for activity change, appetite change, chills, fatigue and fever.        Trach and PEG  Generalized weakness   HENT:  Negative for congestion, facial swelling, hearing loss, nosebleeds, sore throat and trouble swallowing.         Denies pain   Eyes:  Negative for pain and redness.   Respiratory:  Negative for apnea, cough, choking, chest tightness, shortness of breath, wheezing and stridor.    Cardiovascular:  Negative for chest pain, palpitations and leg swelling.   Gastrointestinal:  Negative for abdominal distention, abdominal pain, blood in stool, constipation, diarrhea, nausea and vomiting.   Genitourinary:  Negative for difficulty urinating, dysuria, flank pain, hematuria and urgency.   Musculoskeletal:  Negative for arthralgias, back pain, gait problem, neck pain and neck stiffness.   Skin:  Negative for color change, pallor and rash.   Neurological:  Negative for dizziness, tremors, syncope, facial asymmetry, weakness, light-headedness, numbness and headaches.   Psychiatric/Behavioral:  Negative for agitation, behavioral problems, confusion, decreased concentration, hallucinations, self-injury and sleep disturbance. The patient is not nervous/anxious and is not hyperactive.    Objective:     Vital Signs (Most Recent):  Temp: 97.9 °F (36.6 °C) (05/12/22 0741)  Pulse: 100 (05/12/22 0804)  Resp: 20 (05/12/22 0804)  BP: 101/79 (05/12/22 0741)  SpO2: 95 % (05/12/22 0804)   Vital Signs (24h Range):  Temp:  [97.8 °F (36.6 °C)-98.5 °F (36.9 °C)] 97.9 °F (36.6 °C)  Pulse:  [] 100  Resp:  [18-29] 20  SpO2:  [93 %-98 %] 95 %  BP: (101-137)/(67-81) 101/79     Weight: 62.4 kg (137 lb 9.1 oz)  Body mass index is 20.92 kg/m².    Intake/Output Summary (Last 24 hours) at 5/12/2022 0937  Last data filed at 5/12/2022 0600  Gross per 24 hour   Intake 900 ml   Output 550 ml   Net 350 ml        Physical Exam  Constitutional:       General: He is not  in acute distress.     Appearance: Normal appearance. He is not ill-appearing or diaphoretic.      Comments: Frail elderly, trach and PEG   HENT:      Head: Normocephalic and atraumatic.      Comments: Thin in the face     Nose: Nose normal.      Mouth/Throat:      Mouth: Mucous membranes are moist.      Pharynx: Oropharynx is clear.   Eyes:      General: No scleral icterus.     Extraocular Movements: Extraocular movements intact.      Conjunctiva/sclera: Conjunctivae normal.      Pupils: Pupils are equal, round, and reactive to light.   Cardiovascular:      Rate and Rhythm: Normal rate and regular rhythm.      Pulses: Normal pulses.      Heart sounds: Normal heart sounds.   Pulmonary:      Effort: Pulmonary effort is normal. No respiratory distress.      Breath sounds: Rales (LEFT LUNG BASE) present. No wheezing or rhonchi.   Abdominal:      General: Abdomen is flat. Bowel sounds are normal. There is no distension.      Palpations: Abdomen is soft.      Tenderness: There is no abdominal tenderness. There is no right CVA tenderness or guarding.   Musculoskeletal:         General: No swelling, tenderness or deformity. Normal range of motion.      Cervical back: Normal range of motion and neck supple. No rigidity or tenderness.      Comments: atrophy   Skin:     General: Skin is warm and dry.      Coloration: Skin is not jaundiced.      Findings: No rash.   Neurological:      General: No focal deficit present.      Mental Status: He is alert and oriented to person, place, and time. Mental status is at baseline.      Motor: No weakness.   Psychiatric:         Mood and Affect: Mood normal.         Behavior: Behavior normal.         Thought Content: Thought content normal.         Judgment: Judgment normal.       Significant Labs: All pertinent labs within the past 24 hours have been reviewed.  CBC:   Recent Labs   Lab 05/11/22  0412 05/12/22  0343   WBC 7.18 8.36   HGB 9.5* 9.1*   HCT 31.3* 29.5*    225        CMP:   Recent Labs   Lab 05/10/22  1031 05/11/22  0412 05/12/22  0343    139 135*   K 4.6 5.0 4.4    100 98   CO2 33* 30* 31*   * 103 110   BUN 22 26* 24*   CREATININE 0.8 0.7 0.7   CALCIUM 9.7 9.7 9.1   PROT  --  6.6 6.3   ALBUMIN 2.7* 2.7* 2.6*   BILITOT  --  0.2 0.2   ALKPHOS  --  77 75   AST  --  14 14   ALT  --  10 10   ANIONGAP 7* 9 6*   EGFRNONAA >60.0 >60.0 >60.0         Significant Imaging: I have reviewed all pertinent imaging results/findings within the past 24 hours.

## 2022-05-12 NOTE — PLAN OF CARE
Pt will admit to [facility] today.  Pt's wife notified of discharge.      DANIELE scheduled d/c transportation to [facility] through [facility]. Patient is scheduled to be picked up at 2:30p.m.. DANIELE provided patient's nurse with report number [phone]; ask for the nurse for room [address], Room 45.  DANIELE is in communication with patient's CM and patient's Care Team.  5 Liters/ 28% trach collar.     Transportation auth:  [id number]       05/12/22 1336   Post-Acute Status   Post-Acute Authorization Placement;Home Health   Post-Acute Placement Status Set-up Complete/Auth obtained   Home Health Status Discharge Plan Changed   Coverage [employer]   Discharge Delays None known at this time   Discharge Plan   Discharge Plan A Rehab   Discharge Plan B Home;Home Health     Milady Thomson LMSW  PRN-  [facility]  Ext. [phone]

## 2022-05-12 NOTE — ASSESSMENT & PLAN NOTE
4/25 sodium 150. started on D5W . repeat renal panel in PM   5/4- sodium trended down to 145 -> 140, on TFs -> 138 -> 139, not receiving extra water in TFs -> 141  5/12 - stable

## 2022-05-13 DIAGNOSIS — C77.0 SECONDARY MALIGNANT NEOPLASM OF CERVICAL LYMPH NODE: ICD-10-CM

## 2022-05-13 DIAGNOSIS — C02.9 SQUAMOUS CELL CANCER OF TONGUE: Primary | ICD-10-CM

## 2022-05-13 NOTE — PLAN OF CARE
[facility] - Telemetry Stepdown  Discharge Final Note    Primary Care Provider: Kodi Tubbs MD    Expected Discharge Date: 5/12/2022    Final Discharge Note (most recent)     Final Note - 05/13/22 0711        Final Note    Assessment Type Final Discharge Note     Anticipated Discharge Disposition Rehab Facility        Post-Acute Status    Post-Acute Authorization Placement;Home Health     Post-Acute Placement Status Set-up Complete/Auth obtained     Home Health Status Discharge Plan Changed     Discharge Delays None known at this time                 Important Message from Medicare              Future Appointments   Date Time Provider Department Center   5/17/2022  8:30 AM [facility] OIC-CT2 500 LB LIMIT [facility]   5/17/2022  9:20 AM LAB, HEMONC CANCER BLDG [facility] LAB HO [facility]   5/19/2022  8:30 AM Christopher Jay MD [facility] HEMONC2 [facility]       [facility]  Pt's wife was notified.  Pt transported via stretcher/ 5 liters 28% Trach Collar.    Milady Thomson LMSW  PRN-  [facility]  Ext. [phone]

## 2022-05-14 ENCOUNTER — HOSPITAL ENCOUNTER (EMERGENCY)
Facility: HOSPITAL | Age: 72
Discharge: NURSING, REHAB OR LONG-TERM CARE | End: 2022-05-14
Attending: EMERGENCY MEDICINE
Payer: MEDICARE

## 2022-05-14 VITALS
RESPIRATION RATE: 25 BRPM | SYSTOLIC BLOOD PRESSURE: 152 MMHG | DIASTOLIC BLOOD PRESSURE: 82 MMHG | OXYGEN SATURATION: 98 % | TEMPERATURE: 98 F | BODY MASS INDEX: 20.83 KG/M2 | HEART RATE: 94 BPM | WEIGHT: 137 LBS

## 2022-05-14 DIAGNOSIS — Z93.0 TRACHEOSTOMY STATUS: Primary | ICD-10-CM

## 2022-05-14 DIAGNOSIS — R06.02 SHORTNESS OF BREATH: ICD-10-CM

## 2022-05-14 LAB
ALBUMIN SERPL BCP-MCNC: 2.8 G/DL (ref 3.5–5.2)
ALLENS TEST: ABNORMAL
ALP SERPL-CCNC: 76 U/L (ref 55–135)
ALT SERPL W/O P-5'-P-CCNC: 11 U/L (ref 10–44)
ANION GAP SERPL CALC-SCNC: 10 MMOL/L (ref 8–16)
AST SERPL-CCNC: 14 U/L (ref 10–40)
BACTERIA #/AREA URNS HPF: NORMAL /HPF
BASOPHILS # BLD AUTO: 0.04 K/UL (ref 0–0.2)
BASOPHILS NFR BLD: 0.5 % (ref 0–1.9)
BILIRUB SERPL-MCNC: 0.4 MG/DL (ref 0.1–1)
BILIRUB UR QL STRIP: NEGATIVE
BNP SERPL-MCNC: 98 PG/ML (ref 0–99)
BUN SERPL-MCNC: 18 MG/DL (ref 8–23)
CALCIUM SERPL-MCNC: 9.6 MG/DL (ref 8.7–10.5)
CHLORIDE SERPL-SCNC: 97 MMOL/L (ref 95–110)
CLARITY UR: CLEAR
CO2 SERPL-SCNC: 29 MMOL/L (ref 23–29)
COLOR UR: YELLOW
CREAT SERPL-MCNC: 0.7 MG/DL (ref 0.5–1.4)
DIFFERENTIAL METHOD: ABNORMAL
EOSINOPHIL # BLD AUTO: 0.3 K/UL (ref 0–0.5)
EOSINOPHIL NFR BLD: 3.3 % (ref 0–8)
ERYTHROCYTE [DISTWIDTH] IN BLOOD BY AUTOMATED COUNT: 15.4 % (ref 11.5–14.5)
EST. GFR  (AFRICAN AMERICAN): >60 ML/MIN/1.73 M^2
EST. GFR  (NON AFRICAN AMERICAN): >60 ML/MIN/1.73 M^2
GLUCOSE SERPL-MCNC: 96 MG/DL (ref 70–110)
GLUCOSE UR QL STRIP: NEGATIVE
HCO3 UR-SCNC: 31.5 MMOL/L (ref 24–28)
HCT VFR BLD AUTO: 32.1 % (ref 40–54)
HGB BLD-MCNC: 10 G/DL (ref 14–18)
HGB UR QL STRIP: NEGATIVE
HYALINE CASTS #/AREA URNS LPF: 0 /LPF
IMM GRANULOCYTES # BLD AUTO: 0.04 K/UL (ref 0–0.04)
IMM GRANULOCYTES NFR BLD AUTO: 0.5 % (ref 0–0.5)
KETONES UR QL STRIP: ABNORMAL
LACTATE SERPL-SCNC: 0.8 MMOL/L (ref 0.5–2.2)
LEUKOCYTE ESTERASE UR QL STRIP: NEGATIVE
LYMPHOCYTES # BLD AUTO: 0.7 K/UL (ref 1–4.8)
LYMPHOCYTES NFR BLD: 8 % (ref 18–48)
MCH RBC QN AUTO: 30 PG (ref 27–31)
MCHC RBC AUTO-ENTMCNC: 31.2 G/DL (ref 32–36)
MCV RBC AUTO: 96 FL (ref 82–98)
MICROSCOPIC COMMENT: NORMAL
MONOCYTES # BLD AUTO: 0.9 K/UL (ref 0.3–1)
MONOCYTES NFR BLD: 10.3 % (ref 4–15)
NEUTROPHILS # BLD AUTO: 6.4 K/UL (ref 1.8–7.7)
NEUTROPHILS NFR BLD: 77.4 % (ref 38–73)
NITRITE UR QL STRIP: NEGATIVE
NRBC BLD-RTO: 0 /100 WBC
PCO2 BLDA: 46.9 MMHG (ref 35–45)
PH SMN: 7.43 [PH] (ref 7.35–7.45)
PH UR STRIP: 7 [PH] (ref 5–8)
PLATELET # BLD AUTO: 227 K/UL (ref 150–450)
PMV BLD AUTO: 9.5 FL (ref 9.2–12.9)
PO2 BLDA: 49 MMHG (ref 80–100)
POC BE: 6 MMOL/L
POC SATURATED O2: 85 % (ref 95–100)
POC TCO2: 33 MMOL/L (ref 23–27)
POTASSIUM SERPL-SCNC: 4.1 MMOL/L (ref 3.5–5.1)
PROCALCITONIN SERPL IA-MCNC: 0.13 NG/ML
PROT SERPL-MCNC: 7.5 G/DL (ref 6–8.4)
PROT UR QL STRIP: ABNORMAL
RBC # BLD AUTO: 3.33 M/UL (ref 4.6–6.2)
RBC #/AREA URNS HPF: 1 /HPF (ref 0–4)
SAMPLE: ABNORMAL
SITE: ABNORMAL
SODIUM SERPL-SCNC: 136 MMOL/L (ref 136–145)
SP GR UR STRIP: 1.02 (ref 1–1.03)
TROPONIN I SERPL DL<=0.01 NG/ML-MCNC: 0.02 NG/ML (ref 0–0.03)
URN SPEC COLLECT METH UR: ABNORMAL
UROBILINOGEN UR STRIP-ACNC: NEGATIVE EU/DL
WBC # BLD AUTO: 8.23 K/UL (ref 3.9–12.7)
WBC #/AREA URNS HPF: 1 /HPF (ref 0–5)

## 2022-05-14 PROCEDURE — 85025 COMPLETE CBC W/AUTO DIFF WBC: CPT | Performed by: EMERGENCY MEDICINE

## 2022-05-14 PROCEDURE — 25000003 PHARM REV CODE 250: Performed by: EMERGENCY MEDICINE

## 2022-05-14 PROCEDURE — 99900035 HC TECH TIME PER 15 MIN (STAT)

## 2022-05-14 PROCEDURE — 36600 WITHDRAWAL OF ARTERIAL BLOOD: CPT

## 2022-05-14 PROCEDURE — 83880 ASSAY OF NATRIURETIC PEPTIDE: CPT | Performed by: EMERGENCY MEDICINE

## 2022-05-14 PROCEDURE — 25000242 PHARM REV CODE 250 ALT 637 W/ HCPCS: Performed by: EMERGENCY MEDICINE

## 2022-05-14 PROCEDURE — 87070 CULTURE OTHR SPECIMN AEROBIC: CPT | Performed by: EMERGENCY MEDICINE

## 2022-05-14 PROCEDURE — 99900026 HC AIRWAY MAINTENANCE (STAT)

## 2022-05-14 PROCEDURE — 93010 ELECTROCARDIOGRAM REPORT: CPT | Mod: ,,, | Performed by: INTERNAL MEDICINE

## 2022-05-14 PROCEDURE — 82803 BLOOD GASES ANY COMBINATION: CPT

## 2022-05-14 PROCEDURE — 81000 URINALYSIS NONAUTO W/SCOPE: CPT | Performed by: EMERGENCY MEDICINE

## 2022-05-14 PROCEDURE — 84145 PROCALCITONIN (PCT): CPT | Performed by: EMERGENCY MEDICINE

## 2022-05-14 PROCEDURE — 93010 EKG 12-LEAD: ICD-10-PCS | Mod: ,,, | Performed by: INTERNAL MEDICINE

## 2022-05-14 PROCEDURE — 83605 ASSAY OF LACTIC ACID: CPT | Performed by: EMERGENCY MEDICINE

## 2022-05-14 PROCEDURE — 99285 EMERGENCY DEPT VISIT HI MDM: CPT | Mod: 25

## 2022-05-14 PROCEDURE — 80053 COMPREHEN METABOLIC PANEL: CPT | Performed by: EMERGENCY MEDICINE

## 2022-05-14 PROCEDURE — 94640 AIRWAY INHALATION TREATMENT: CPT | Mod: XB

## 2022-05-14 PROCEDURE — 87077 CULTURE AEROBIC IDENTIFY: CPT | Performed by: EMERGENCY MEDICINE

## 2022-05-14 PROCEDURE — 93005 ELECTROCARDIOGRAM TRACING: CPT

## 2022-05-14 PROCEDURE — 27000221 HC OXYGEN, UP TO 24 HOURS

## 2022-05-14 PROCEDURE — 94760 N-INVAS EAR/PLS OXIMETRY 1: CPT

## 2022-05-14 PROCEDURE — 87040 BLOOD CULTURE FOR BACTERIA: CPT | Performed by: EMERGENCY MEDICINE

## 2022-05-14 PROCEDURE — 87205 SMEAR GRAM STAIN: CPT | Performed by: EMERGENCY MEDICINE

## 2022-05-14 PROCEDURE — 27200966 HC CLOSED SUCTION SYSTEM

## 2022-05-14 PROCEDURE — 96360 HYDRATION IV INFUSION INIT: CPT

## 2022-05-14 PROCEDURE — 84484 ASSAY OF TROPONIN QUANT: CPT | Performed by: EMERGENCY MEDICINE

## 2022-05-14 PROCEDURE — 94667 MNPJ CHEST WALL 1ST: CPT

## 2022-05-14 PROCEDURE — 87186 SC STD MICRODIL/AGAR DIL: CPT | Mod: 59 | Performed by: EMERGENCY MEDICINE

## 2022-05-14 RX ORDER — GLYCOPYRROLATE 0.2 MG/ML
0.1 INJECTION INTRAMUSCULAR; INTRAVENOUS ONCE
Status: DISCONTINUED | OUTPATIENT
Start: 2022-05-14 | End: 2022-05-14 | Stop reason: CLARIF

## 2022-05-14 RX ORDER — IPRATROPIUM BROMIDE AND ALBUTEROL SULFATE 2.5; .5 MG/3ML; MG/3ML
3 SOLUTION RESPIRATORY (INHALATION)
Status: COMPLETED | OUTPATIENT
Start: 2022-05-14 | End: 2022-05-14

## 2022-05-14 RX ORDER — SODIUM CHLORIDE FOR INHALATION 3 %
4 VIAL, NEBULIZER (ML) INHALATION ONCE
Status: COMPLETED | OUTPATIENT
Start: 2022-05-14 | End: 2022-05-14

## 2022-05-14 RX ADMIN — SODIUM CHLORIDE 1000 ML: 0.9 INJECTION, SOLUTION INTRAVENOUS at 07:05

## 2022-05-14 RX ADMIN — SODIUM CHLORIDE 30 MG/ML INHALATION SOLUTION 4 ML: 30 SOLUTION INHALANT at 08:05

## 2022-05-14 RX ADMIN — GLYCOPYRROLATE 0.1 MG: 0.2 INJECTION, SOLUTION INTRAMUSCULAR; INTRAVITREAL at 07:05

## 2022-05-14 RX ADMIN — IPRATROPIUM BROMIDE AND ALBUTEROL SULFATE 3 ML: 2.5; .5 SOLUTION RESPIRATORY (INHALATION) at 07:05

## 2022-05-14 RX ADMIN — SODIUM CHLORIDE 30 MG/ML INHALATION SOLUTION 4 ML: 30 SOLUTION INHALANT at 12:05

## 2022-05-14 NOTE — ED TRIAGE NOTES
Patient come sin via [facility] from [facility], low oxygen saturation, has trach collar in place on 15L oxygen sats 88%, Resp and provider at bedside, suctioned patient multiple times oxygen went up to 90%, patient was also able to cough up secretions as well.

## 2022-05-14 NOTE — ED PROVIDER NOTES
Encounter Date: 5/14/2022       History     Chief Complaint   Patient presents with    Tracheostomy     Mucus plug in trach. Unable to clear. SpO2 high 80s. Reports SOB, denies CP. Hx of esophageal/mouth CA.      73 yo M with past medical history of squamous cell carcinoma of the tongue s/p total glossectomy and flap with tracheostomy 1/4/22, COPD, hypertension, who presents by EMS with complaint of shortness of breath from [facility] .Patient in usual state of health yesterday when spouse left. She states they gave him a little saline through the trach and he was able to cough up a lot of secretions. Patient indicates he feels as though he has a mucous plug. Attempts at suctioning at rehab facility unsuccessful, O2 sats with EMS around 86-89% on 15 L via trach collar. Patient is on scopolamine patch and glycopyrolate for secreations.Spouse states his normal O2 sats 88-90% on 5L on the trach collar. Dr. Crow is his oncologist. He completed 6 chemo and 33 radiation treatments. Patient was admitted ot hosptial 4/19-5/12, required vent due to lethargy and acute hypercapneic respiratory failure, weaned backt o trach collar, covered for CAP. Requried 8L HF O2 to maintain sats around 90s.        Review of patient's allergies indicates:  No Known Allergies  Past Medical History:   Diagnosis Date    Cancer     skin to Rt forearm and face    COPD (chronic obstructive pulmonary disease)     Hyperlipidemia     Hypertension     Pseudoaneurysm      Past Surgical History:   Procedure Laterality Date    ABDOMINAL SURGERY      stents placed in liver and large intestines, per patient    CAROTID STENT      CORONARY STENT PLACEMENT  01/2000    DISSECTION OF NECK Bilateral 1/4/2022    Procedure: DISSECTION, NECK;  Surgeon: Naeem Smalls MD;  Location: [facility] OR [address] [place] [address];  Service: ENT;  Laterality: Bilateral;    ESOPHAGOGASTRODUODENOSCOPY W/ PEG N/A 1/4/2022    Procedure: EGD, WITH PEG TUBE INSERTION;  Surgeon: Anthony  AMEE Calabrese MD;  Location: [address];  Service: General;  Laterality: N/A;    EYE SURGERY      Cataract bilateral    femoral stents      bilateral    FLAP PROCEDURE N/A 1/3/2022    Procedure: CREATION, FREE FLAP;  Surgeon: Naeem Smalls MD;  [facility];  Service: ENT;  Laterality: N/A;    FLAP PROCEDURE Right 2022    Procedure: CREATION, FREE FLAP;  Surgeon: Stacey Conde MD;  [facility];  Service: ENT;  Laterality: Right;  Ischemic start 1203  Ischemic stop 1353    GLOSSECTOMY N/A 2022    Procedure: GLOSSECTOMY;  Surgeon: Naeem Smalls MD;  Location: [facility] OR [address] [place] [address];  Service: ENT;  Laterality: N/A;    RADICAL NECK DISSECTION Left 1/3/2022    Procedure: DISSECTION, NECK, RADICAL;  Surgeon: Naeem Smalls MD;  Location: [facility] OR [address] [place] [address];  Service: ENT;  Laterality: Left;    SKIN CANCER EXCISION      TRACHEOTOMY N/A 2022    Procedure: TRACHEOTOMY;  Surgeon: Naeem Smalls MD;  Location: [address];  Service: ENT;  Laterality: N/A;     History reviewed. No pertinent family history.  Social History     Tobacco Use    Smoking status: Former Smoker     Packs/day: 2.00     Years: 40.00     Pack years: 80.00     Types: Cigarettes     Start date: 1963     Quit date: 2018     Years since quittin.0    Smokeless tobacco: Never Used    Tobacco comment: 3/3 ppd x 40 yrs. Currently 3-4 cigarettes daily .He is trying  to quit. Is using a Vapor cigarettes  2-3 x's a day.   Substance Use Topics    Alcohol use: Not Currently     Alcohol/week: 3.0 standard drinks     Types: 3 Cans of beer per week     Comment: beer daily 3-4    Drug use: No     Review of Systems   Constitutional: Negative for fever.   Respiratory: Positive for shortness of breath.    Cardiovascular: Negative for chest pain.   Gastrointestinal: Negative for abdominal pain and vomiting.   Genitourinary: Negative for difficulty urinating.       Physical Exam     Initial  Vitals   BP Pulse Resp Temp SpO2   05/14/22 0649 05/14/22 0649 05/14/22 0649 05/14/22 0651 05/14/22 0649   (!) 146/90 107 16 98 °F (36.7 °C) (!) 88 %      MAP       --                Physical Exam    Constitutional: He appears well-developed and well-nourished. He is not diaphoretic.   HENT:   Head: Normocephalic.   Eyes: Conjunctivae are normal.   Neck: Neck supple.   Tracheostomy with trach collar in place   Cardiovascular: Regular rhythm. Tachycardia present.    Pulmonary/Chest: He is in respiratory distress (mild).   Diminished breath sounds throughout, slight tachypnea, coughs with attempts at suctioning   Abdominal: Abdomen is soft. Bowel sounds are normal.   PEG tube in place   Musculoskeletal:         General: No edema.      Cervical back: Neck supple.     Neurological: He is alert. GCS score is 15. GCS eye subscore is 4. GCS verbal subscore is 5. GCS motor subscore is 6.   Psychiatric: He has a normal mood and affect.         ED Course   Procedures  Labs Reviewed   CBC W/ AUTO DIFFERENTIAL - Abnormal; Notable for the following components:       Result Value    RBC 3.33 (*)     Hemoglobin 10.0 (*)     Hematocrit 32.1 (*)     MCHC 31.2 (*)     RDW 15.4 (*)     Lymph # 0.7 (*)     Gran % 77.4 (*)     Lymph % 8.0 (*)     All other components within normal limits   COMPREHENSIVE METABOLIC PANEL - Abnormal; Notable for the following components:    Albumin 2.8 (*)     All other components within normal limits   URINALYSIS, REFLEX TO URINE CULTURE - Abnormal; Notable for the following components:    Protein, UA 1+ (*)     Ketones, UA Trace (*)     All other components within normal limits    Narrative:     Specimen Source->Urine   ISTAT PROCEDURE - Abnormal; Notable for the following components:    POC PCO2 46.9 (*)     POC PO2 49 (*)     POC HCO3 31.5 (*)     POC SATURATED O2 85 (*)     POC TCO2 33 (*)     All other components within normal limits   CULTURE, BLOOD   CULTURE, BLOOD   CULTURE, RESPIRATORY   B-TYPE  "NATRIURETIC PEPTIDE   TROPONIN I   LACTIC ACID, PLASMA   PROCALCITONIN   URINALYSIS MICROSCOPIC    Narrative:     Specimen Source->Urine   PROCALCITONIN        ECG Results          EKG 12-lead (Final result)  Result time 05/14/22 12:41:38    Final result by Interface, Lab In [facility] (05/14/22 12:41:38)                 Narrative:    Test Reason : R06.02,    Vent. Rate : 106 BPM     Atrial Rate : 106 BPM     P-R Int : 158 ms          QRS Dur : 092 ms      QT Int : 352 ms       P-R-T Axes : 082 -71 088 degrees     QTc Int : 467 ms    Sinus tachycardia  Possible Left atrial enlargement  Incomplete right bundle branch block  Left anterior fascicular block  Abnormal ECG  When compared with ECG of 05-MAY-2022 19:10,  No significant change was found  Confirmed by Tacos Benitez MD ([id number]) on 5/14/2022 12:41:32 PM    Referred By: [email or web address]   SELF           Confirmed By:Tacos Benitez MD                  ED Interpretation by Ruth Ann Mcclelland MD (05/14/22 07:46:03, [facility], Emergency Medicine)    Sinus tachycardia, rate 115 beats per minute, normal P interval, QTC 41 milliseconds, no STEMI.                            Imaging Results          X-Ray Chest AP Portable (Final result)  Result time 05/14/22 07:59:11    Final result by Mariano Soto MD (05/14/22 07:59:11)                 Impression:      Persistent bibasilar opacities versus subsegmental atelectasis.  No detrimental change when compared with 05/04/2022.      Electronically signed by: Mariano Soto MD  Date:    05/14/2022  Time:    07:59             Narrative:    EXAMINATION:  XR CHEST AP PORTABLE    CLINICAL HISTORY:  Provided history is "shortness of breath;  ".    TECHNIQUE:  One view of the chest.    COMPARISON:  05/04/2022.    FINDINGS:  Cardiac wires overlie the chest.  Postoperative changes identified in the neck.  Tracheostomy tube overlies the tracheal air column between the clavicular heads and sherine.  Coarsened interstitial lung " markings and persistent bibasilar subsegmental opacities.  Blunting of the bilateral costophrenic angles.  No large pleural effusion.  No detrimental change in lung aeration.  Upper lung fields are relatively clear.                                 Medications   sodium chloride 0.9% bolus 1,000 mL (0 mLs Intravenous Stopped 5/14/22 0907)   albuterol-ipratropium 2.5 mg-0.5 mg/3 mL nebulizer solution 3 mL (3 mLs Nebulization Given 5/14/22 0713)   glycopyrrolate (PF) injection 0.1 mg (0.1 mg Intravenous Given 5/14/22 0735)   sodium chloride 3% nebulizer solution 4 mL (4 mLs Nebulization Given 5/14/22 0802)   sodium chloride 3% nebulizer solution 4 mL (4 mLs Nebulization Given 5/14/22 1253)     Medical Decision Making:   Initial Assessment:   72-year-old male with history of squamous cell carcinoma of the tongue, status post glossectomy to be in tracheostomy, presents from rehab with shortness of breath.  Patient indicates he feels he has a mucus plug.  Attempted suctioning at the rehab facility unsuccessful.  He is on a scopolamine patch and glycopyrrolate for secretions.  Recent prolonged hospitalization due to respiratory failure.  Covered with antibiotics for community-acquired pneumonia.  On exam here, the patient has an O2 sat around 88-90% on 15 L via trach collar, diminished breath sounds throughout, slight tachypnea.  He is alert, answers questions by nodding head yes or no and mouth Ng words to questions appropriately.  Spouse states he was in his usual state of health yesterday when she last saw him.  No recent fevers.  Respiratory therapy called to bedside to suction trach, some improvement in respiratory status.  Will try DuoNeb, IV glycopyrrolate.  Will obtain IV access and get blood work and chest x-ray.             ED Course as of 05/14/22 1336   Sat May 14, 2022   0742 Patient was able to suction up large amount of thick, yellow secretions from oropharynx, reports feeling improved. Reports he did not  take his glycopyrroate last night. [LH]   0801 Case reviewed with Dr. Willard (pulmonology) who recommends continuing supportive care with suctioning, chest physiotherapy, and saline nebs. Recommends culturing sputum, consider starting antibiotics (recent culture grew out pseudomonas that is pan sensitive), states culture today will likely grow out pseudomonas as he probably colonized with this. I plan on deferring antibiotics as there has not been any report of fever, there is no leukocytosis or elevated lactic acid on lab exam. [LH]   1052 I just decreased oxygen to 5 L on trach collar, patient reports feeling at his baseline at this time.  Chest x-ray appears overall unchanged from previous study.  No leukocytosis, lactic acid normal.  Patient with anemia, does not meet criteria for transfusion.  Will continue to monitor on her baseline 5 L oxygen via trach collar, if no further desaturations or shortness of breath will discharge back to rehab facility.  Patient and spouse are in agreement with this plan. [LH]   1224 Patient reassessed, he is feeling improved.  He has been on 5 L via the trach collar satting 95%.  The patient confirms he feels his breathing is at baseline.  Will discharge back to rehab facility, discussed importance of continuing suctioning, saline if needed to break up secretions.  I will order 1 more saline neb prior to discharge as I anticipate we will be waiting for an ambulance for patient.  Patient's spouse at bedside in agreement with this plan. [LH]      ED Course User Index  [LH] Ruth Ann Mcclelland MD             Clinical Impression:   Final diagnoses:  [R06.02] Shortness of breath  [Z93.0] Tracheostomy status (Primary)          ED Disposition Condition    Discharge Stable       This dictation has been generated using M-Modal Fluency Direct dictation; some phonetic errors may occur.     ED Prescriptions     None        Follow-up Information     Follow up With Specialties Details Why Contact  Info    [facility] - Emergency Dept Emergency Medicine  As needed, If symptoms worsen [address]  [facility] [id number] [phone]           Ruth Ann Mcclelland MD  05/14/22 2495

## 2022-05-14 NOTE — ED NOTES
Suctioned patient, provider at bedside, states will arrange transportation to return to [facility], and should have an additional saline neb   DISPLAY PLAN FREE TEXT

## 2022-05-16 LAB
BACTERIA SPEC AEROBE CULT: ABNORMAL
GRAM STN SPEC: ABNORMAL

## 2022-05-17 ENCOUNTER — LAB VISIT (OUTPATIENT)
Dept: LAB | Facility: HOSPITAL | Age: 72
End: 2022-05-17
Attending: STUDENT IN AN ORGANIZED HEALTH CARE EDUCATION/TRAINING PROGRAM
Payer: MEDICARE

## 2022-05-17 DIAGNOSIS — C02.9 SQUAMOUS CELL CANCER OF TONGUE: ICD-10-CM

## 2022-05-17 LAB
ALBUMIN SERPL BCP-MCNC: 2.6 G/DL (ref 3.5–5.2)
ALP SERPL-CCNC: 69 U/L (ref 55–135)
ALT SERPL W/O P-5'-P-CCNC: 12 U/L (ref 10–44)
ANION GAP SERPL CALC-SCNC: 8 MMOL/L (ref 8–16)
AST SERPL-CCNC: 14 U/L (ref 10–40)
BASOPHILS # BLD AUTO: 0.04 K/UL (ref 0–0.2)
BASOPHILS NFR BLD: 0.8 % (ref 0–1.9)
BILIRUB SERPL-MCNC: 0.2 MG/DL (ref 0.1–1)
BUN SERPL-MCNC: 15 MG/DL (ref 8–23)
CALCIUM SERPL-MCNC: 9.5 MG/DL (ref 8.7–10.5)
CHLORIDE SERPL-SCNC: 94 MMOL/L (ref 95–110)
CO2 SERPL-SCNC: 31 MMOL/L (ref 23–29)
CREAT SERPL-MCNC: 0.7 MG/DL (ref 0.5–1.4)
DIFFERENTIAL METHOD: ABNORMAL
EOSINOPHIL # BLD AUTO: 0.3 K/UL (ref 0–0.5)
EOSINOPHIL NFR BLD: 4.7 % (ref 0–8)
ERYTHROCYTE [DISTWIDTH] IN BLOOD BY AUTOMATED COUNT: 14.9 % (ref 11.5–14.5)
EST. GFR  (AFRICAN AMERICAN): >60 ML/MIN/1.73 M^2
EST. GFR  (NON AFRICAN AMERICAN): >60 ML/MIN/1.73 M^2
GLUCOSE SERPL-MCNC: 162 MG/DL (ref 70–110)
HCT VFR BLD AUTO: 29.8 % (ref 40–54)
HGB BLD-MCNC: 9.3 G/DL (ref 14–18)
IMM GRANULOCYTES # BLD AUTO: 0.02 K/UL (ref 0–0.04)
IMM GRANULOCYTES NFR BLD AUTO: 0.4 % (ref 0–0.5)
LYMPHOCYTES # BLD AUTO: 0.6 K/UL (ref 1–4.8)
LYMPHOCYTES NFR BLD: 12.1 % (ref 18–48)
MAGNESIUM SERPL-MCNC: 2 MG/DL (ref 1.6–2.6)
MCH RBC QN AUTO: 29.4 PG (ref 27–31)
MCHC RBC AUTO-ENTMCNC: 31.2 G/DL (ref 32–36)
MCV RBC AUTO: 94 FL (ref 82–98)
MONOCYTES # BLD AUTO: 0.7 K/UL (ref 0.3–1)
MONOCYTES NFR BLD: 12.3 % (ref 4–15)
NEUTROPHILS # BLD AUTO: 3.7 K/UL (ref 1.8–7.7)
NEUTROPHILS NFR BLD: 69.7 % (ref 38–73)
NRBC BLD-RTO: 0 /100 WBC
PLATELET # BLD AUTO: 217 K/UL (ref 150–450)
PMV BLD AUTO: 9.4 FL (ref 9.2–12.9)
POTASSIUM SERPL-SCNC: 5 MMOL/L (ref 3.5–5.1)
PROT SERPL-MCNC: 6.8 G/DL (ref 6–8.4)
RBC # BLD AUTO: 3.16 M/UL (ref 4.6–6.2)
SODIUM SERPL-SCNC: 133 MMOL/L (ref 136–145)
WBC # BLD AUTO: 5.29 K/UL (ref 3.9–12.7)

## 2022-05-17 PROCEDURE — 36415 COLL VENOUS BLD VENIPUNCTURE: CPT | Performed by: STUDENT IN AN ORGANIZED HEALTH CARE EDUCATION/TRAINING PROGRAM

## 2022-05-17 PROCEDURE — 83735 ASSAY OF MAGNESIUM: CPT | Performed by: STUDENT IN AN ORGANIZED HEALTH CARE EDUCATION/TRAINING PROGRAM

## 2022-05-17 PROCEDURE — 80053 COMPREHEN METABOLIC PANEL: CPT | Performed by: STUDENT IN AN ORGANIZED HEALTH CARE EDUCATION/TRAINING PROGRAM

## 2022-05-17 PROCEDURE — 85025 COMPLETE CBC W/AUTO DIFF WBC: CPT | Performed by: STUDENT IN AN ORGANIZED HEALTH CARE EDUCATION/TRAINING PROGRAM

## 2022-05-18 LAB
BACTERIA BLD CULT: NORMAL
BACTERIA BLD CULT: NORMAL

## 2022-05-19 ENCOUNTER — TELEPHONE (OUTPATIENT)
Dept: HEMATOLOGY/ONCOLOGY | Facility: CLINIC | Age: 72
End: 2022-05-19
Payer: MEDICARE

## 2022-05-19 ENCOUNTER — OFFICE VISIT (OUTPATIENT)
Dept: HEMATOLOGY/ONCOLOGY | Facility: CLINIC | Age: 72
End: 2022-05-19
Payer: MEDICARE

## 2022-05-19 VITALS
RESPIRATION RATE: 20 BRPM | BODY MASS INDEX: 20.83 KG/M2 | DIASTOLIC BLOOD PRESSURE: 85 MMHG | TEMPERATURE: 98 F | OXYGEN SATURATION: 93 % | HEIGHT: 68 IN | SYSTOLIC BLOOD PRESSURE: 134 MMHG | HEART RATE: 104 BPM

## 2022-05-19 DIAGNOSIS — Z79.899 IMMUNODEFICIENCY DUE TO DRUG THERAPY: ICD-10-CM

## 2022-05-19 DIAGNOSIS — D84.821 IMMUNODEFICIENCY DUE TO DRUG THERAPY: ICD-10-CM

## 2022-05-19 DIAGNOSIS — Z92.3 H/O HEAD AND NECK RADIATION: ICD-10-CM

## 2022-05-19 DIAGNOSIS — D64.9 NORMOCYTIC ANEMIA: ICD-10-CM

## 2022-05-19 DIAGNOSIS — C77.0 SECONDARY MALIGNANT NEOPLASM OF CERVICAL LYMPH NODE: ICD-10-CM

## 2022-05-19 DIAGNOSIS — E03.2 HYPOTHYROIDISM DUE TO MEDICAMENTS AND OTHER EXOGENOUS SUBSTANCES: ICD-10-CM

## 2022-05-19 DIAGNOSIS — C02.9 SQUAMOUS CELL CANCER OF TONGUE: Primary | ICD-10-CM

## 2022-05-19 PROBLEM — E87.0 HYPERNATREMIA: Status: RESOLVED | Noted: 2022-04-25 | Resolved: 2022-05-19

## 2022-05-19 PROBLEM — E87.6 HYPOKALEMIA: Status: RESOLVED | Noted: 2022-04-21 | Resolved: 2022-05-19

## 2022-05-19 PROBLEM — E83.39 HYPOPHOSPHATEMIA: Status: RESOLVED | Noted: 2022-04-24 | Resolved: 2022-05-19

## 2022-05-19 PROCEDURE — 99999 PR PBB SHADOW E&M-EST. PATIENT-LVL III: ICD-10-PCS | Mod: PBBFAC,,, | Performed by: STUDENT IN AN ORGANIZED HEALTH CARE EDUCATION/TRAINING PROGRAM

## 2022-05-19 PROCEDURE — 4010F PR ACE/ARB THEARPY RXD/TAKEN: ICD-10-PCS | Mod: CPTII,S$GLB,, | Performed by: STUDENT IN AN ORGANIZED HEALTH CARE EDUCATION/TRAINING PROGRAM

## 2022-05-19 PROCEDURE — 3079F DIAST BP 80-89 MM HG: CPT | Mod: CPTII,S$GLB,, | Performed by: STUDENT IN AN ORGANIZED HEALTH CARE EDUCATION/TRAINING PROGRAM

## 2022-05-19 PROCEDURE — 4010F ACE/ARB THERAPY RXD/TAKEN: CPT | Mod: CPTII,S$GLB,, | Performed by: STUDENT IN AN ORGANIZED HEALTH CARE EDUCATION/TRAINING PROGRAM

## 2022-05-19 PROCEDURE — 3288F FALL RISK ASSESSMENT DOCD: CPT | Mod: CPTII,S$GLB,, | Performed by: STUDENT IN AN ORGANIZED HEALTH CARE EDUCATION/TRAINING PROGRAM

## 2022-05-19 PROCEDURE — 3008F BODY MASS INDEX DOCD: CPT | Mod: CPTII,S$GLB,, | Performed by: STUDENT IN AN ORGANIZED HEALTH CARE EDUCATION/TRAINING PROGRAM

## 2022-05-19 PROCEDURE — 1126F AMNT PAIN NOTED NONE PRSNT: CPT | Mod: CPTII,S$GLB,, | Performed by: STUDENT IN AN ORGANIZED HEALTH CARE EDUCATION/TRAINING PROGRAM

## 2022-05-19 PROCEDURE — 3075F PR MOST RECENT SYSTOLIC BLOOD PRESS GE 130-139MM HG: ICD-10-PCS | Mod: CPTII,S$GLB,, | Performed by: STUDENT IN AN ORGANIZED HEALTH CARE EDUCATION/TRAINING PROGRAM

## 2022-05-19 PROCEDURE — 3079F PR MOST RECENT DIASTOLIC BLOOD PRESSURE 80-89 MM HG: ICD-10-PCS | Mod: CPTII,S$GLB,, | Performed by: STUDENT IN AN ORGANIZED HEALTH CARE EDUCATION/TRAINING PROGRAM

## 2022-05-19 PROCEDURE — 99214 PR OFFICE/OUTPT VISIT, EST, LEVL IV, 30-39 MIN: ICD-10-PCS | Mod: S$GLB,,, | Performed by: STUDENT IN AN ORGANIZED HEALTH CARE EDUCATION/TRAINING PROGRAM

## 2022-05-19 PROCEDURE — 3075F SYST BP GE 130 - 139MM HG: CPT | Mod: CPTII,S$GLB,, | Performed by: STUDENT IN AN ORGANIZED HEALTH CARE EDUCATION/TRAINING PROGRAM

## 2022-05-19 PROCEDURE — 1101F PT FALLS ASSESS-DOCD LE1/YR: CPT | Mod: CPTII,S$GLB,, | Performed by: STUDENT IN AN ORGANIZED HEALTH CARE EDUCATION/TRAINING PROGRAM

## 2022-05-19 PROCEDURE — 3008F PR BODY MASS INDEX (BMI) DOCUMENTED: ICD-10-PCS | Mod: CPTII,S$GLB,, | Performed by: STUDENT IN AN ORGANIZED HEALTH CARE EDUCATION/TRAINING PROGRAM

## 2022-05-19 PROCEDURE — 1126F PR PAIN SEVERITY QUANTIFIED, NO PAIN PRESENT: ICD-10-PCS | Mod: CPTII,S$GLB,, | Performed by: STUDENT IN AN ORGANIZED HEALTH CARE EDUCATION/TRAINING PROGRAM

## 2022-05-19 PROCEDURE — 1101F PR PT FALLS ASSESS DOC 0-1 FALLS W/OUT INJ PAST YR: ICD-10-PCS | Mod: CPTII,S$GLB,, | Performed by: STUDENT IN AN ORGANIZED HEALTH CARE EDUCATION/TRAINING PROGRAM

## 2022-05-19 PROCEDURE — 99999 PR PBB SHADOW E&M-EST. PATIENT-LVL III: CPT | Mod: PBBFAC,,, | Performed by: STUDENT IN AN ORGANIZED HEALTH CARE EDUCATION/TRAINING PROGRAM

## 2022-05-19 PROCEDURE — 3288F PR FALLS RISK ASSESSMENT DOCUMENTED: ICD-10-PCS | Mod: CPTII,S$GLB,, | Performed by: STUDENT IN AN ORGANIZED HEALTH CARE EDUCATION/TRAINING PROGRAM

## 2022-05-19 PROCEDURE — 99214 OFFICE O/P EST MOD 30 MIN: CPT | Mod: S$GLB,,, | Performed by: STUDENT IN AN ORGANIZED HEALTH CARE EDUCATION/TRAINING PROGRAM

## 2022-05-19 NOTE — PROGRESS NOTES
PATIENT: Fareed Richard Jr.  MRN: [id number]  DATE: 5/19/2022      Diagnosis:   1. Squamous cell cancer of tongue    2. Secondary malignant neoplasm of cervical lymph node    3. Immunodeficiency due to drug therapy    4. Normocytic anemia    5. H/O head and neck radiation    6. 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         Chief Complaint: Squamous cell cancer of tongue      Oncologic History:    Oncologic History 1. Squamous cell carcinoma of tongue    Oncologic Treatment 1.  1/4/22 total glossectomy, bilateral neck dissection, bilateral cervical facial advancement flaps and anterolateral thigh free flap reconstruction of his glossectomy defect   2. 2/28/22-4/20/22 adjuvant chemoradiation with cisplatin    Pathology Squamous cell carcinoma, poorly differentiated, p16 negative  Final pathology: pT4a:  Moderately advanced local disease.  Tumor size = 6.0 cm with DOI = 29 mm and invades adjacent structures.   pN3b:  Metastasis in single contralateral node, SALINAS(+).   pM:  Not applicable.   Superior soft tissue margin of the left neck is involved by tumor           Subjective:    Interval History: Mr. Richard is here for follow up    Oncological History copied from medical chart: Initially saw Aletha Cook NP (ENT) 11/29/21 regarding non-healing left sided tongue lesion for about 6 weeks.  Associated symptoms included tongue pain, bleeding, left otalgia, weight loss from difficulty eating, and bump under left chin that enlarged and was refractory to oral antibiotics and nystatin.   History significant for skin cancer of the left cheek and right lower lip about 5-6 years ago. He reported he had a flap done with Dr. Starks. His wife notes that one of the cancers was SCC and the other was basal cell but she cannot call which was which. 12/6/21 CT neck was done (findings below). 12/15/21 he saw Dr. Smalls who performed an FNA of his left neck mass, which finalized as squamous cell carcinoma, p16  negative.   12/16/21 CT chest without contrast was done (findings below).  1/4/22 underwent total glossectomy, bilateral neck dissection, bilateral cervical facial advancement flaps and anterolateral thigh free flap reconstruction of his glossectomy defect.  Final pathology [id number], +SALINAS (microscopic), +margin (superior soft tissue margin of left neck).    He completed chemoradiation 4/20/22    Interval History:   Since his last visit, he was hospitalized 4/19/22-5/12/22 for acute on chronic hypoxemic respiratory failure thought to be secondary to pneumonia.  He was subsequently discharged to rehab and is still there.    He is doing well.  Still has xerostomia and mucus in trach needing saline to clear it out.  He is back at baseline 5 L trach collar.  No neuropathy or nausea.  Was using 6 cartons of iso source and is down to 5 because he feels full after 5.    Was recently evaluated for dyspnea and what was thought to be a mucus plug.  Sputum culture resulted pansensitive pseudomonas and he was put on antibiotics for it (of note, procalcitonin and wbc count at the time was wnl, blood cx ngtd).  His PEG tube is clean and patent. Insertion site is also clean and skin is intact.  He denies any issues getting peripheral IVs or labs.  Tube feed dependent; all nutrition is via peg tube. S/p glossectomy.    Former smoker, 110 pack years.    Past Medical History:   Past Medical History:   Diagnosis Date    Cancer     skin to Rt forearm and face    COPD (chronic obstructive pulmonary disease)     Hyperlipidemia     Hypertension     Pseudoaneurysm        Past Surgical HIstory:   Past Surgical History:   Procedure Laterality Date    ABDOMINAL SURGERY      stents placed in liver and large intestines, per patient    CAROTID STENT      CORONARY STENT PLACEMENT  01/2000    DISSECTION OF NECK Bilateral 1/4/2022    Procedure: DISSECTION, NECK;  Surgeon: Naeem Smalls MD;  Location: [facility] OR [address] [place] [address];  Service: ENT;   Laterality: Bilateral;    ESOPHAGOGASTRODUODENOSCOPY W/ PEG N/A 1/4/2022    Procedure: EGD, WITH PEG TUBE INSERTION;  Surgeon: Anthony Calabrese MD;  [facility];  Service: General;  Laterality: N/A;    EYE SURGERY      Cataract bilateral    femoral stents      bilateral    FLAP PROCEDURE N/A 1/3/2022    Procedure: CREATION, FREE FLAP;  Surgeon: Naeem Smalls MD;  [facility];  Service: ENT;  Laterality: N/A;    FLAP PROCEDURE Right 1/4/2022    Procedure: CREATION, FREE FLAP;  Surgeon: Stacey Conde MD;  [facility];  Service: ENT;  Laterality: Right;  Ischemic start 1203  Ischemic stop 1353    GLOSSECTOMY N/A 1/4/2022    Procedure: GLOSSECTOMY;  Surgeon: Naeem Smalls MD;  [facility];  Service: ENT;  Laterality: N/A;    RADICAL NECK DISSECTION Left 1/3/2022    Procedure: DISSECTION, NECK, RADICAL;  Surgeon: Naeem Smalls MD;  [facility];  Service: ENT;  Laterality: Left;    SKIN CANCER EXCISION      TRACHEOTOMY N/A 1/4/2022    Procedure: TRACHEOTOMY;  Surgeon: Naeem Smalls MD;  Location: [facility] OR [address] [place] [address];  Service: ENT;  Laterality: N/A;       Family History: No family history on file.    Social History:  reports that he quit smoking about 4 years ago. His smoking use included cigarettes. He started smoking about 59 years ago. He has a 80.00 pack-year smoking history. He has never used smokeless tobacco. He reports previous alcohol use of about 3.0 standard drinks of alcohol per week. He reports that he does not use drugs.    Allergies:  Review of patient's allergies indicates:  No Known Allergies    Medications:  Current Outpatient Medications   Medication Sig Dispense Refill    acetaminophen (TYLENOL) 325 MG tablet 2 tablets (650 mg total) by Per G Tube route every 6 (six) hours.  0    albuterol-ipratropium (DUO-NEB) 2.5 mg-0.5 mg/3 mL nebulizer solution Take 3 mLs by nebulization every 4 (four) hours  as needed for Wheezing. Rescue 75 mL 0    aspirin 81 MG Chew 1 tablet (81 mg total) by Per G Tube route once daily.  0    atorvastatin (LIPITOR) 20 MG tablet 1 tablet (20 mg total) by Per G Tube route once daily. 90 tablet 3    bisacodyL (DULCOLAX) 10 mg Supp Place 1 suppository (10 mg total) rectally daily as needed.  0    clopidogreL (PLAVIX) 75 mg tablet 1 tablet (75 mg total) by Per G Tube route once daily. 30 tablet 11    enoxaparin (LOVENOX) 40 mg/0.4 mL Syrg Inject 0.4 mLs (40 mg total) into the skin once daily.      folic acid (FOLVITE) 1 MG tablet 1 tablet (1 mg total) by Per G Tube route once daily.  0    glycopyrrolate (CUVPOSA) 1 mg/5 mL (0.2 mg/mL) Soln Take 5 mLs (1 mg total) by mouth 3 (three) times daily as needed (TO REDUCE SECRETIONS). 473 mL 1    guaiFENesin 200 mg/5 mL Liqd Take 400 mg by mouth every 4 (four) hours as needed (for secretions). 473 mL 3    melatonin (MELATIN) 3 mg tablet 2 tablets (6 mg total) by Per G Tube route nightly.  0    ondansetron (ZOFRAN-ODT) 8 MG TbDL Take 1 tablet (8 mg total) by mouth every 8 (eight) hours as needed (nausea). 30 tablet 1    oxyCODONE (ROXICODONE) 5 mg/5 mL Soln 5 mLs (5 mg total) by Per G Tube route every 4 (four) hours as needed (Pain). 150 mL 0    polyethylene glycol (GLYCOLAX) 17 gram PwPk 17 g by Per G Tube route 2 (two) times daily.  0    scopolamine (TRANSDERM-SCOP) 1.3-1.5 mg (1 mg over 3 days) Place 1 patch onto the skin Every 3 (three) days. 10 patch 0    sodium chloride (OCEAN) 0.65 % nasal spray 1 spray by Nasal route as needed for Congestion.  12    thiamine 100 MG tablet 1 tablet (100 mg total) by Per G Tube route once daily.       No current facility-administered medications for this visit.       Review of Systems   Constitutional: Positive for fatigue. Negative for activity change, appetite change, chills, diaphoresis, fever and unexpected weight change.   HENT: Positive for trouble swallowing and voice change. Negative  "for ear pain, mouth sores and nosebleeds.    Eyes: Negative for visual disturbance.   Respiratory: Positive for cough and shortness of breath. Negative for chest tightness and wheezing.    Cardiovascular: Negative for chest pain and leg swelling.   Gastrointestinal: Negative for abdominal distention, abdominal pain, blood in stool, constipation, diarrhea, nausea and vomiting.   Endocrine: Negative for cold intolerance and heat intolerance.   Genitourinary: Negative for difficulty urinating and dysuria.   Musculoskeletal: Negative for arthralgias and back pain.   Skin: Negative for color change.   Neurological: Positive for weakness. Negative for dizziness, light-headedness, numbness and headaches.   Hematological: Negative for adenopathy. Does not bruise/bleed easily.   Psychiatric/Behavioral: Negative for confusion.       ECOG Performance Status:     ECOG SCORE    3 - Capable of only limited selfcare, confined to bed or chair more than 50% of waking hours         Objective:      Vitals:   Vitals:    05/19/22 1251   BP: 134/85   Pulse: 104   Resp: 20   Temp: 98 °F (36.7 °C)   TempSrc: Temporal   SpO2: (!) 93%   Height: 5' 8" (1.727 m)     BMI: Body mass index is 20.83 kg/m².    Physical Exam  Constitutional:       General: He is not in acute distress.     Appearance: He is ill-appearing.   HENT:      Head: Normocephalic and atraumatic.      Mouth/Throat:      Pharynx: No oropharyngeal exudate or posterior oropharyngeal erythema.   Eyes:      General: No scleral icterus.     Extraocular Movements: Extraocular movements intact.      Conjunctiva/sclera: Conjunctivae normal.      Pupils: Pupils are equal, round, and reactive to light.   Neck:      Comments: +trach  Cardiovascular:      Rate and Rhythm: Normal rate and regular rhythm.      Heart sounds: No murmur heard.    No friction rub. No gallop.   Pulmonary:      Effort: Pulmonary effort is normal. No respiratory distress.      Breath sounds: No stridor. No " wheezing, rhonchi or rales.   Abdominal:      General: Bowel sounds are normal. There is no distension.      Palpations: Abdomen is soft. There is no mass.      Tenderness: There is no abdominal tenderness. There is no guarding or rebound.      Comments: +peg tube site c/d/i   Musculoskeletal:         General: Normal range of motion.      Cervical back: Normal range of motion and neck supple.      Right lower leg: No edema.      Left lower leg: No edema.   Skin:     General: Skin is warm and dry.      Findings: No erythema.   Neurological:      General: No focal deficit present.      Mental Status: He is alert.      Motor: Weakness present.         Laboratory Data:   Recent Results (from the past 168 hour(s))   CBC auto differential    Collection Time: 05/14/22  7:24 AM   Result Value Ref Range    WBC 8.23 3.90 - 12.70 K/uL    RBC 3.33 (L) 4.60 - 6.20 M/uL    Hemoglobin 10.0 (L) 14.0 - 18.0 g/dL    Hematocrit 32.1 (L) 40.0 - 54.0 %    MCV 96 82 - 98 fL    MCH 30.0 27.0 - 31.0 pg    MCHC 31.2 (L) 32.0 - 36.0 g/dL    RDW 15.4 (H) 11.5 - 14.5 %    Platelets 227 150 - 450 K/uL    MPV 9.5 9.2 - 12.9 fL    Immature Granulocytes 0.5 0.0 - 0.5 %    Gran # (ANC) 6.4 1.8 - 7.7 K/uL    Immature Grans (Abs) 0.04 0.00 - 0.04 K/uL    Lymph # 0.7 (L) 1.0 - 4.8 K/uL    Mono # 0.9 0.3 - 1.0 K/uL    Eos # 0.3 0.0 - 0.5 K/uL    Baso # 0.04 0.00 - 0.20 K/uL    nRBC 0 0 /100 WBC    Gran % 77.4 (H) 38.0 - 73.0 %    Lymph % 8.0 (L) 18.0 - 48.0 %    Mono % 10.3 4.0 - 15.0 %    Eosinophil % 3.3 0.0 - 8.0 %    Basophil % 0.5 0.0 - 1.9 %    Differential Method Automated    Comprehensive metabolic panel    Collection Time: 05/14/22  7:24 AM   Result Value Ref Range    Sodium 136 136 - 145 mmol/L    Potassium 4.1 3.5 - 5.1 mmol/L    Chloride 97 95 - 110 mmol/L    CO2 29 23 - 29 mmol/L    Glucose 96 70 - 110 mg/dL    BUN 18 8 - 23 mg/dL    Creatinine 0.7 0.5 - 1.4 mg/dL    Calcium 9.6 8.7 - 10.5 mg/dL    Total Protein 7.5 6.0 - 8.4 g/dL     Albumin 2.8 (L) 3.5 - 5.2 g/dL    Total Bilirubin 0.4 0.1 - 1.0 mg/dL    Alkaline Phosphatase 76 55 - 135 U/L    AST 14 10 - 40 U/L    ALT 11 10 - 44 U/L    Anion Gap 10 8 - 16 mmol/L    eGFR if African American >60 >60 mL/min/1.73 m^2    eGFR if non African American >60 >60 mL/min/1.73 m^2   Troponin I    Collection Time: 05/14/22  7:24 AM   Result Value Ref Range    Troponin I 0.017 0.000 - 0.026 ng/mL   Brain natriuretic peptide    Collection Time: 05/14/22  7:25 AM   Result Value Ref Range    BNP 98 0 - 99 pg/mL   Lactic acid, plasma    Collection Time: 05/14/22  7:25 AM   Result Value Ref Range    Lactate (Lactic Acid) 0.8 0.5 - 2.2 mmol/L   Blood culture #1 **CANNOT BE ORDERED STAT**    Collection Time: 05/14/22  7:25 AM    Specimen: Peripheral, Forearm, Left; Blood   Result Value Ref Range    Blood Culture, Routine No Growth after 4 days.     Blood culture #2 **CANNOT BE ORDERED STAT**    Collection Time: 05/14/22  7:25 AM    Specimen: Peripheral, Antecubital, Right; Blood   Result Value Ref Range    Blood Culture, Routine No Growth after 4 days.     Procalcitonin    Collection Time: 05/14/22  7:25 AM   Result Value Ref Range    Procalcitonin 0.13 <0.25 ng/mL   ISTAT PROCEDURE    Collection Time: 05/14/22  7:57 AM   Result Value Ref Range    POC PH 7.434 7.35 - 7.45    POC PCO2 46.9 (H) 35 - 45 mmHg    POC PO2 49 (LL) 80 - 100 mmHg    POC HCO3 31.5 (H) 24 - 28 mmol/L    POC BE 6 -2 to 2 mmol/L    POC SATURATED O2 85 (L) 95 - 100 %    POC TCO2 33 (H) 23 - 27 mmol/L    Sample ARTERIAL     Site RR     Allens Test N/A    Urinalysis, Reflex to Urine Culture Urine, Clean Catch    Collection Time: 05/14/22 11:16 AM    Specimen: Urine   Result Value Ref Range    Specimen UA Urine, Clean Catch     Color, UA Yellow Yellow, Straw, Brianne    Appearance, UA Clear Clear    pH, UA 7.0 5.0 - 8.0    Specific Gravity, UA 1.020 1.005 - 1.030    Protein, UA 1+ (A) Negative    Glucose, UA Negative Negative    Ketones, UA Trace (A)  Negative    Bilirubin (UA) Negative Negative    Occult Blood UA Negative Negative    Nitrite, UA Negative Negative    Urobilinogen, UA Negative <2.0 EU/dL    Leukocytes, UA Negative Negative   Urinalysis Microscopic    Collection Time: 05/14/22 11:16 AM   Result Value Ref Range    RBC, UA 1 0 - 4 /hpf    WBC, UA 1 0 - 5 /hpf    Bacteria None None-Occ /hpf    Hyaline Casts, UA 0 0-1/lpf /lpf    Microscopic Comment SEE COMMENT    Culture, Respiratory with Gram Stain    Collection Time: 05/14/22 12:52 PM    Specimen: Sputum, Induced; Respiratory   Result Value Ref Range    Respiratory Culture PSEUDOMONAS AERUGINOSA  Moderate   (A)     Gram Stain (Respiratory) <10 epithelial cells per low power field.     Gram Stain (Respiratory) Rare WBC's     Gram Stain (Respiratory) Few Gram positive rods     Gram Stain (Respiratory) Rare Gram negative rods        Susceptibility    Pseudomonas aeruginosa - CULTURE, RESPIRATORY     Amikacin <=16 Sensitive mcg/mL     Ciprofloxacin <=1 Sensitive mcg/mL     Cefepime 8 Sensitive mcg/mL     Gentamicin <=4 Sensitive mcg/mL     Levofloxacin <=2 Sensitive mcg/mL     Meropenem 2 Sensitive mcg/mL     Minocycline N/R  mcg/mL     Piperacillin/Tazo 64 Sensitive mcg/mL     Tobramycin <=4 Sensitive mcg/mL   CBC Auto Differential    Collection Time: 05/17/22 10:07 AM   Result Value Ref Range    WBC 5.29 3.90 - 12.70 K/uL    RBC 3.16 (L) 4.60 - 6.20 M/uL    Hemoglobin 9.3 (L) 14.0 - 18.0 g/dL    Hematocrit 29.8 (L) 40.0 - 54.0 %    MCV 94 82 - 98 fL    MCH 29.4 27.0 - 31.0 pg    MCHC 31.2 (L) 32.0 - 36.0 g/dL    RDW 14.9 (H) 11.5 - 14.5 %    Platelets 217 150 - 450 K/uL    MPV 9.4 9.2 - 12.9 fL    Immature Granulocytes 0.4 0.0 - 0.5 %    Gran # (ANC) 3.7 1.8 - 7.7 K/uL    Immature Grans (Abs) 0.02 0.00 - 0.04 K/uL    Lymph # 0.6 (L) 1.0 - 4.8 K/uL    Mono # 0.7 0.3 - 1.0 K/uL    Eos # 0.3 0.0 - 0.5 K/uL    Baso # 0.04 0.00 - 0.20 K/uL    nRBC 0 0 /100 WBC    Gran % 69.7 38.0 - 73.0 %    Lymph % 12.1  (L) 18.0 - 48.0 %    Mono % 12.3 4.0 - 15.0 %    Eosinophil % 4.7 0.0 - 8.0 %    Basophil % 0.8 0.0 - 1.9 %    Differential Method Automated    Comprehensive Metabolic Panel    Collection Time: 05/17/22 10:07 AM   Result Value Ref Range    Sodium 133 (L) 136 - 145 mmol/L    Potassium 5.0 3.5 - 5.1 mmol/L    Chloride 94 (L) 95 - 110 mmol/L    CO2 31 (H) 23 - 29 mmol/L    Glucose 162 (H) 70 - 110 mg/dL    BUN 15 8 - 23 mg/dL    Creatinine 0.7 0.5 - 1.4 mg/dL    Calcium 9.5 8.7 - 10.5 mg/dL    Total Protein 6.8 6.0 - 8.4 g/dL    Albumin 2.6 (L) 3.5 - 5.2 g/dL    Total Bilirubin 0.2 0.1 - 1.0 mg/dL    Alkaline Phosphatase 69 55 - 135 U/L    AST 14 10 - 40 U/L    ALT 12 10 - 44 U/L    Anion Gap 8 8 - 16 mmol/L    eGFR if African American >60.0 >60 mL/min/1.73 m^2    eGFR if non African American >60.0 >60 mL/min/1.73 m^2   Magnesium    Collection Time: 05/17/22 10:07 AM   Result Value Ref Range    Magnesium 2.0 1.6 - 2.6 mg/dL       Imaging:         Assessment:       1. Squamous cell cancer of tongue    2. Secondary malignant neoplasm of cervical lymph node    3. Immunodeficiency due to drug therapy    4. Normocytic anemia    5. H/O head and neck radiation    6. 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              Oncology    Squamous cell cancer of tongue - Primary    Overview     12/15/21 FNA left neck mass : squamous cell carcinoma, p16 negative  1/4/22 total glossectomy, bilateral neck dissection, bilateral cervical facial advancement flaps and anterolateral thigh free flap reconstruction of his glossectomy defect.  Final path :  [id number] (+microscopic SALINAS, single contralateral node), superior soft tissue margin of left neck with  tumor.    2/28/22-4/20/22 completed adjuvant chemoradiation with weekly cisplatin (240 mg/m2 cumulative dose)           Current Assessment & Plan     Stage IVB squamous cell carcinoma of the tongue with contralateral cervical lymph node involvement, +margins, +SALINAS (microscopic).  S/p peg tube placement. S/p adjuvant chemoradiation.  Physical exam without evidence of local recurrence.  -labs reviewed  -follow up in 3 months with labs  -Discussed current situation with lack of contrast (global supply shortage).  Will defer post-treatment imaging until further notice.  -support medications:  zofran ODT prn nausea. Glycopyrrolate prn secretions. Magic mouthwash for mucositis from chemoRT           Relevant Orders    TSH    Normocytic anemia    Current Assessment & Plan     stable           Secondary malignant neoplasm of cervical lymph node    Overview     See sq.c.c. 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 Morning appointment preferred   Follow up with HEIKE    Labs CMP, CBC and TSH   Lab interval:  In 3 months   Imaging None      Pharmacy appointment No pharmacy appointment needed      Other referrals No additional referrals needed                Christopher Jay MD  Hematology Oncology

## 2022-05-19 NOTE — TELEPHONE ENCOUNTER
"----- Message from Raya Hutton sent at 5/19/2022 12:07 PM CDT -----         Consult/Advisory:      Name Of Caller:Jayde with [facility]   Contact Preference?:[phone]       Provider Name: Misty  Does patient feel the need to be seen today?no      What is the nature of the call?:She wants to know if Dr Jay can still see patient today. Please justa       Additional Notes:  "Thank you for all that you do for our patients'"                           "

## 2022-05-19 NOTE — ASSESSMENT & PLAN NOTE
Stage IVB squamous cell carcinoma of the tongue with contralateral cervical lymph node involvement, +margins, +SALINAS (microscopic).  S/p peg tube placement. S/p adjuvant chemoradiation.  Physical exam without evidence of local recurrence.  -labs reviewed  -follow up in 3 months with labs  -Discussed current situation with lack of contrast (global supply shortage).  Will defer post-treatment imaging until further notice.  -support medications:  zofran ODT prn nausea. Glycopyrrolate prn secretions. Magic mouthwash for mucositis from chemoRT

## 2022-05-25 ENCOUNTER — TELEPHONE (OUTPATIENT)
Dept: RADIATION ONCOLOGY | Facility: CLINIC | Age: 72
End: 2022-05-25
Payer: MEDICARE

## 2022-05-25 NOTE — TELEPHONE ENCOUNTER
followup call after completing radiation to the h/n He just got home from rehab today His wife states he is doing well f/u appt made and confirmed

## 2022-06-09 ENCOUNTER — OFFICE VISIT (OUTPATIENT)
Dept: RADIATION ONCOLOGY | Facility: CLINIC | Age: 72
End: 2022-06-09
Payer: MEDICARE

## 2022-06-09 VITALS
DIASTOLIC BLOOD PRESSURE: 52 MMHG | OXYGEN SATURATION: 97 % | WEIGHT: 127 LBS | TEMPERATURE: 98 F | HEART RATE: 73 BPM | BODY MASS INDEX: 19.25 KG/M2 | SYSTOLIC BLOOD PRESSURE: 88 MMHG | RESPIRATION RATE: 19 BRPM | HEIGHT: 68 IN

## 2022-06-09 DIAGNOSIS — C02.9 SQUAMOUS CELL CANCER OF TONGUE: Primary | ICD-10-CM

## 2022-06-09 PROCEDURE — 99499 UNLISTED E&M SERVICE: CPT | Mod: S$GLB,,, | Performed by: STUDENT IN AN ORGANIZED HEALTH CARE EDUCATION/TRAINING PROGRAM

## 2022-06-09 PROCEDURE — 1101F PT FALLS ASSESS-DOCD LE1/YR: CPT | Mod: CPTII,S$GLB,, | Performed by: STUDENT IN AN ORGANIZED HEALTH CARE EDUCATION/TRAINING PROGRAM

## 2022-06-09 PROCEDURE — 3288F FALL RISK ASSESSMENT DOCD: CPT | Mod: CPTII,S$GLB,, | Performed by: STUDENT IN AN ORGANIZED HEALTH CARE EDUCATION/TRAINING PROGRAM

## 2022-06-09 PROCEDURE — 4010F ACE/ARB THERAPY RXD/TAKEN: CPT | Mod: CPTII,S$GLB,, | Performed by: STUDENT IN AN ORGANIZED HEALTH CARE EDUCATION/TRAINING PROGRAM

## 2022-06-09 PROCEDURE — 1159F MED LIST DOCD IN RCRD: CPT | Mod: CPTII,S$GLB,, | Performed by: STUDENT IN AN ORGANIZED HEALTH CARE EDUCATION/TRAINING PROGRAM

## 2022-06-09 PROCEDURE — 3078F DIAST BP <80 MM HG: CPT | Mod: CPTII,S$GLB,, | Performed by: STUDENT IN AN ORGANIZED HEALTH CARE EDUCATION/TRAINING PROGRAM

## 2022-06-09 PROCEDURE — 1101F PR PT FALLS ASSESS DOC 0-1 FALLS W/OUT INJ PAST YR: ICD-10-PCS | Mod: CPTII,S$GLB,, | Performed by: STUDENT IN AN ORGANIZED HEALTH CARE EDUCATION/TRAINING PROGRAM

## 2022-06-09 PROCEDURE — 3074F PR MOST RECENT SYSTOLIC BLOOD PRESSURE < 130 MM HG: ICD-10-PCS | Mod: CPTII,S$GLB,, | Performed by: STUDENT IN AN ORGANIZED HEALTH CARE EDUCATION/TRAINING PROGRAM

## 2022-06-09 PROCEDURE — 3288F PR FALLS RISK ASSESSMENT DOCUMENTED: ICD-10-PCS | Mod: CPTII,S$GLB,, | Performed by: STUDENT IN AN ORGANIZED HEALTH CARE EDUCATION/TRAINING PROGRAM

## 2022-06-09 PROCEDURE — 4010F PR ACE/ARB THEARPY RXD/TAKEN: ICD-10-PCS | Mod: CPTII,S$GLB,, | Performed by: STUDENT IN AN ORGANIZED HEALTH CARE EDUCATION/TRAINING PROGRAM

## 2022-06-09 PROCEDURE — 3074F SYST BP LT 130 MM HG: CPT | Mod: CPTII,S$GLB,, | Performed by: STUDENT IN AN ORGANIZED HEALTH CARE EDUCATION/TRAINING PROGRAM

## 2022-06-09 PROCEDURE — 99999 PR PBB SHADOW E&M-EST. PATIENT-LVL IV: CPT | Mod: PBBFAC,,, | Performed by: STUDENT IN AN ORGANIZED HEALTH CARE EDUCATION/TRAINING PROGRAM

## 2022-06-09 PROCEDURE — 3008F BODY MASS INDEX DOCD: CPT | Mod: CPTII,S$GLB,, | Performed by: STUDENT IN AN ORGANIZED HEALTH CARE EDUCATION/TRAINING PROGRAM

## 2022-06-09 PROCEDURE — 3008F PR BODY MASS INDEX (BMI) DOCUMENTED: ICD-10-PCS | Mod: CPTII,S$GLB,, | Performed by: STUDENT IN AN ORGANIZED HEALTH CARE EDUCATION/TRAINING PROGRAM

## 2022-06-09 PROCEDURE — 1126F AMNT PAIN NOTED NONE PRSNT: CPT | Mod: CPTII,S$GLB,, | Performed by: STUDENT IN AN ORGANIZED HEALTH CARE EDUCATION/TRAINING PROGRAM

## 2022-06-09 PROCEDURE — 3078F PR MOST RECENT DIASTOLIC BLOOD PRESSURE < 80 MM HG: ICD-10-PCS | Mod: CPTII,S$GLB,, | Performed by: STUDENT IN AN ORGANIZED HEALTH CARE EDUCATION/TRAINING PROGRAM

## 2022-06-09 PROCEDURE — 99499 NO LOS: ICD-10-PCS | Mod: S$GLB,,, | Performed by: STUDENT IN AN ORGANIZED HEALTH CARE EDUCATION/TRAINING PROGRAM

## 2022-06-09 PROCEDURE — 1159F PR MEDICATION LIST DOCUMENTED IN MEDICAL RECORD: ICD-10-PCS | Mod: CPTII,S$GLB,, | Performed by: STUDENT IN AN ORGANIZED HEALTH CARE EDUCATION/TRAINING PROGRAM

## 2022-06-09 PROCEDURE — 1126F PR PAIN SEVERITY QUANTIFIED, NO PAIN PRESENT: ICD-10-PCS | Mod: CPTII,S$GLB,, | Performed by: STUDENT IN AN ORGANIZED HEALTH CARE EDUCATION/TRAINING PROGRAM

## 2022-06-09 PROCEDURE — 99999 PR PBB SHADOW E&M-EST. PATIENT-LVL IV: ICD-10-PCS | Mod: PBBFAC,,, | Performed by: STUDENT IN AN ORGANIZED HEALTH CARE EDUCATION/TRAINING PROGRAM

## 2022-06-09 NOTE — PROGRESS NOTES
[facility] Department of Radiation Oncology  Follow Up Visit Note    Diagnosis:  Fareed Richard Jr. is a 72 y.o. 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 He is s/p adjuvant chemoradiation to 66 Gy in 33 fractions, completed 4/22/22.    Oncologic History:  He has a history of tobacco use ~100 pack years.   · 12/13/21: met with head and neck surgery, with 3.5cm ulcerated left anteriolateral tongue mass. No buccal or FOM lesions. 3cm, firm, minimally mobile left submandibular adenopathy. No facial weakness.  ? FNA: of left neck mass with metastatic squamous cell carcinoma  · 1/4/22: DL, Trach, total glossectomy, bilateral neck dissection of 1-4 and resection of left submandibular cervical skin  ? Op note: extensive submucosal involvement of the vast majority of the tongue. DL with no other lesions.  Lingual nerve was identified.  Was taken to the skull base and found to be negative for carcinoma  ? Path: 6 cm primary, with 29 mm DOI, with invasion of the left neck soft tissue, submandibular gland and cervical skin. - SM , extensive PNI. 3/68 LN+ (ITC in left lvl II-IV, 1/5LN+ in left IB, with SALINAS+  · 1/4/22: anterolateral thigh free flap and advancement flap. Post op stay was complicated by positive respiratory cultures requiring ABx. He was transferred to rehab.   · 2/28/22-4/22/22: 66 Gy in 33 fractions to the region of SALINAS, 60 Gy in 30 fractions to the operative bed, level I, and left II-IV and 54 Gy to left lvl V and right II-IV.       Interval History  The patient presents today for a regularly scheduled follow up visit.  He was last seen in our clinic on 4/22/22.   At that time, he was completing his adjuvant CRT. He was admitted in the hospital for hypercapnic respiratory failure and was in a rehab facility until late May.     He presents with his wife, doing well  "overall but remains on 5L NC.  No otalgia, hemoptysis, new neck masses or skin lesions. No painful or bleeding oral lesions. No new facial numbness or weakness. Tolerating 6 tube feeds a day. Remains NPO. He is still requiring suction to clear secretions. Has home health nursing and speech coming every week.     Review of Systems   Review of Systems   Constitutional: Negative for weight loss.   HENT: Negative for ear pain.    Respiratory: Positive for shortness of breath (stable).    Cardiovascular: Negative for chest pain.   Neurological: Negative for dizziness, sensory change, weakness and headaches.        Social History:  Social History     Tobacco Use    Smoking status: Former Smoker     Packs/day: 2.00     Years: 40.00     Pack years: 80.00     Types: Cigarettes     Start date: 1963     Quit date: 2018     Years since quittin.1    Smokeless tobacco: Never Used    Tobacco comment: 3/3 ppd x 40 yrs. Currently 3-4 cigarettes daily .He is trying  to quit. Is using a Vapor cigarettes  2-3 x's a day.   Substance Use Topics    Alcohol use: Not Currently     Alcohol/week: 3.0 standard drinks     Types: 3 Cans of beer per week     Comment: beer daily 3-4    Drug use: No       Exam:  Vitals:    22 1103   BP: (!) 88/52   BP Location: Right arm   Patient Position: Sitting   BP Method: Medium (Automatic)   Pulse: 73   Resp: 19   Temp: 97.6 °F (36.4 °C)   SpO2: 97%   Weight: 57.6 kg (127 lb)   Height: 5' 8" (1.727 m)     Constitutional: Pleasant 72 y.o. male in no acute distress.  Well nourished. Thin appearing, in a wheelchair.   HEENT: s/p total glossectomy, with intact flap. No appreciated bleeding or ulcerated lesions on direct exam. Dry mucus membranes. Skin is well healed without any suspicious lesions. No appreciated cervical adenopathy but he has fibrosis of the bilateral neck. Trach in place  Lungs: No audible wheezing.  Normal effort.   Musculoskeletal: No gross MSK deformities.  GI: PEG " tube without drainage or surrounding erythema  Skin: No rashes appreciated.   Psych: Alert and oriented with appropriate mood and affect.  Neuro:  Grossly normal.    Data Review:  Information obtained via chart review and discussion with Mr. Richard and his wife.    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 He is s/p adjuvant chemoradiation to 66 Gy in 33 fractions, completed 4/22/22.  · ~6 weeks s/p completion of adjuvant CRT   ECOG: (2) Ambulatory and capable of self care, unable to carry out work activity, up and about > 50% or waking hours    Plan:   RTC after MRI neck   Will coordinate follow up with H&N surgery   He will see med onc in August     Zaki Brunson MD  Radiation Oncology

## 2022-06-09 NOTE — Clinical Note
Can we help with scheduling his MRI neck. Med onc ordered it but it never got scheduled. It should be done at the end of July and can we see him back right after or same day as the scan? He is set up for 3 months from now but someone should see him with the MRI.

## 2022-06-30 ENCOUNTER — OFFICE VISIT (OUTPATIENT)
Dept: OTOLARYNGOLOGY | Facility: CLINIC | Age: 72
End: 2022-06-30
Payer: MEDICARE

## 2022-06-30 VITALS
DIASTOLIC BLOOD PRESSURE: 62 MMHG | TEMPERATURE: 97 F | SYSTOLIC BLOOD PRESSURE: 96 MMHG | WEIGHT: 132.94 LBS | HEART RATE: 79 BPM | BODY MASS INDEX: 20.21 KG/M2

## 2022-06-30 DIAGNOSIS — C02.9 SQUAMOUS CELL CANCER OF TONGUE: Primary | ICD-10-CM

## 2022-06-30 PROCEDURE — 3078F PR MOST RECENT DIASTOLIC BLOOD PRESSURE < 80 MM HG: ICD-10-PCS | Mod: CPTII,S$GLB,, | Performed by: OTOLARYNGOLOGY

## 2022-06-30 PROCEDURE — 4010F ACE/ARB THERAPY RXD/TAKEN: CPT | Mod: CPTII,S$GLB,, | Performed by: OTOLARYNGOLOGY

## 2022-06-30 PROCEDURE — 3078F DIAST BP <80 MM HG: CPT | Mod: CPTII,S$GLB,, | Performed by: OTOLARYNGOLOGY

## 2022-06-30 PROCEDURE — 3074F PR MOST RECENT SYSTOLIC BLOOD PRESSURE < 130 MM HG: ICD-10-PCS | Mod: CPTII,S$GLB,, | Performed by: OTOLARYNGOLOGY

## 2022-06-30 PROCEDURE — 1160F PR REVIEW ALL MEDS BY PRESCRIBER/CLIN PHARMACIST DOCUMENTED: ICD-10-PCS | Mod: CPTII,S$GLB,, | Performed by: OTOLARYNGOLOGY

## 2022-06-30 PROCEDURE — 1159F PR MEDICATION LIST DOCUMENTED IN MEDICAL RECORD: ICD-10-PCS | Mod: CPTII,S$GLB,, | Performed by: OTOLARYNGOLOGY

## 2022-06-30 PROCEDURE — 4010F PR ACE/ARB THEARPY RXD/TAKEN: ICD-10-PCS | Mod: CPTII,S$GLB,, | Performed by: OTOLARYNGOLOGY

## 2022-06-30 PROCEDURE — 3074F SYST BP LT 130 MM HG: CPT | Mod: CPTII,S$GLB,, | Performed by: OTOLARYNGOLOGY

## 2022-06-30 PROCEDURE — 1126F AMNT PAIN NOTED NONE PRSNT: CPT | Mod: CPTII,S$GLB,, | Performed by: OTOLARYNGOLOGY

## 2022-06-30 PROCEDURE — 3288F PR FALLS RISK ASSESSMENT DOCUMENTED: ICD-10-PCS | Mod: CPTII,S$GLB,, | Performed by: OTOLARYNGOLOGY

## 2022-06-30 PROCEDURE — 1159F MED LIST DOCD IN RCRD: CPT | Mod: CPTII,S$GLB,, | Performed by: OTOLARYNGOLOGY

## 2022-06-30 PROCEDURE — 3288F FALL RISK ASSESSMENT DOCD: CPT | Mod: CPTII,S$GLB,, | Performed by: OTOLARYNGOLOGY

## 2022-06-30 PROCEDURE — 3008F PR BODY MASS INDEX (BMI) DOCUMENTED: ICD-10-PCS | Mod: CPTII,S$GLB,, | Performed by: OTOLARYNGOLOGY

## 2022-06-30 PROCEDURE — 1101F PT FALLS ASSESS-DOCD LE1/YR: CPT | Mod: CPTII,S$GLB,, | Performed by: OTOLARYNGOLOGY

## 2022-06-30 PROCEDURE — 99213 PR OFFICE/OUTPT VISIT, EST, LEVL III, 20-29 MIN: ICD-10-PCS | Mod: S$GLB,,, | Performed by: OTOLARYNGOLOGY

## 2022-06-30 PROCEDURE — 1101F PR PT FALLS ASSESS DOC 0-1 FALLS W/OUT INJ PAST YR: ICD-10-PCS | Mod: CPTII,S$GLB,, | Performed by: OTOLARYNGOLOGY

## 2022-06-30 PROCEDURE — 99213 OFFICE O/P EST LOW 20 MIN: CPT | Mod: S$GLB,,, | Performed by: OTOLARYNGOLOGY

## 2022-06-30 PROCEDURE — 99999 PR PBB SHADOW E&M-EST. PATIENT-LVL IV: CPT | Mod: PBBFAC,,, | Performed by: OTOLARYNGOLOGY

## 2022-06-30 PROCEDURE — 99999 PR PBB SHADOW E&M-EST. PATIENT-LVL IV: ICD-10-PCS | Mod: PBBFAC,,, | Performed by: OTOLARYNGOLOGY

## 2022-06-30 PROCEDURE — 1126F PR PAIN SEVERITY QUANTIFIED, NO PAIN PRESENT: ICD-10-PCS | Mod: CPTII,S$GLB,, | Performed by: OTOLARYNGOLOGY

## 2022-06-30 PROCEDURE — 3008F BODY MASS INDEX DOCD: CPT | Mod: CPTII,S$GLB,, | Performed by: OTOLARYNGOLOGY

## 2022-06-30 PROCEDURE — 1160F RVW MEDS BY RX/DR IN RCRD: CPT | Mod: CPTII,S$GLB,, | Performed by: OTOLARYNGOLOGY

## 2022-06-30 RX ORDER — FLUTICASONE PROPIONATE AND SALMETEROL 250; 50 UG/1; UG/1
POWDER RESPIRATORY (INHALATION)
Status: ON HOLD | COMMUNITY
Start: 2022-05-25 | End: 2024-01-24

## 2022-06-30 RX ORDER — HYDROXYZINE HYDROCHLORIDE 25 MG/1
TABLET, FILM COATED ORAL
Status: ON HOLD | COMMUNITY
Start: 2022-05-31 | End: 2024-01-24

## 2022-07-03 NOTE — PROGRESS NOTES
Chief Complaint   Patient presents with    Tracheostomy Tube Change     Oncology History   Squamous cell cancer of tongue   12/16/2021 Initial Diagnosis    Squamous cell cancer of tongue     12/16/2021 Tumor Conference       His case was discussed at the Multidisciplinary Head and Neck Team Planning Meeting.    Representatives from Medical Oncology, Radiation Oncology, Head and Neck Surgical Oncology, Psychosocial Oncology, and Speech and Language Pathology discussed the case with the following recommendations:    1) surgery  2) work up lung nodule post op         1/2/2022 Cancer Staged    Staging form: Oral Cavity, AJCC 8th Edition  - Clinical stage from 1/2/2022: Stage NAFISA (cT2, cN2a, cM0)     1/4/2022 Cancer Staged    Staging form: Oral Cavity, AJCC 8th Edition  - Pathologic stage from 1/4/2022: Stage IVB (pT4a, pN3b, cM0)     2/28/2022 - 4/6/2022 Chemotherapy    Treatment Summary   Plan Name: OP HEAD NECK CISPLATIN WEEKLY + RADIOTHERAPY  Treatment Goal: Curative  Status: Inactive  Start Date: 2/28/2022  End Date: 4/6/2022  Provider: Christopher Jay MD  Chemotherapy: CISplatin (PLATINOL) 40 mg/m2 = 69 mg in sodium chloride 0.9% 500 mL chemo infusion, 40 mg/m2 = 69 mg, Intravenous, Clinic/HOD 1 time, 6 of 7 cycles  Administration: 69 mg (2/28/2022), 66 mg (3/7/2022), 69 mg (3/16/2022), 69 mg (3/23/2022), 69 mg (3/30/2022), 70 mg (4/6/2022)      Radiation Therapy    Treatment Summary  Course: C1 HN 2022  Treatment Site Ref. ID Energy Dose/Fx (Gy) #Fx Dose Correction (Gy) Total Dose (Gy) Start Date End Date Elapsed Days   IM H&N:1 PTV_66 6X 2 7 / 7 0 14 2/28/2022 3/10/2022 10   IM H&N2 PTV_66 6X 2 26 / 26 0 51.903 3/11/2022 4/22/2022 42        Secondary malignant neoplasm of cervical lymph node   2/16/2022 Initial Diagnosis    Secondary malignant neoplasm of cervical lymph node     2/28/2022 - 4/6/2022 Chemotherapy    Treatment Summary   Plan Name: OP HEAD NECK CISPLATIN WEEKLY + RADIOTHERAPY  Treatment Goal:  Curative  Status: Inactive  Start Date: 2/28/2022  End Date: 4/6/2022  Provider: Christopher Jay MD  Chemotherapy: CISplatin (PLATINOL) 40 mg/m2 = 69 mg in sodium chloride 0.9% 500 mL chemo infusion, 40 mg/m2 = 69 mg, Intravenous, Clinic/HOD 1 time, 6 of 7 cycles  Administration: 69 mg (2/28/2022), 66 mg (3/7/2022), 69 mg (3/16/2022), 69 mg (3/23/2022), 69 mg (3/30/2022), 70 mg (4/6/2022)           HPI   72 y.o. male presents with the above treatment history.  He has nearly completed chemoradiation therapy.  He presents today for trach change.  No other complaints.    Review of Systems   Constitutional: Negative for fatigue and unexpected weight change.   HENT: Per HPI.  Eyes: Negative for visual disturbance.   Respiratory: Negative for shortness of breath, hemoptysis   Cardiovascular: Negative for chest pain and palpitations.   Musculoskeletal: Negative for decreased ROM, back pain.   Skin: Negative for rash, sunburn, itching.   Neurological: Negative for dizziness and seizures.   Hematological: Negative for adenopathy. Does not bruise/bleed easily.   Endocrine: Negative for rapid weight loss/weight gain, heat/cold intolerance.     Past Medical History   Patient Active Problem List   Diagnosis    Peripheral vascular disease    Carotid stenosis    Squamous cell cancer of tongue    Coronary artery disease involving native coronary artery of native heart without angina pectoris    Primary hypertension    Other hyperlipidemia    Pre-operative cardiovascular examination    Impaired mobility and ADLs    Tracheobronchitis    Toxic effect of tobacco cigarette, intentional self-harm    COPD exacerbation    Normocytic anemia    Secondary malignant neoplasm of cervical lymph node    Protein-calorie malnutrition    Chronic respiratory failure with hypoxia, on home O2 therapy    Acute on chronic respiratory failure    Dysphagia    Sinus tachycardia    Aspiration pneumonia    Goals of care, counseling/discussion            Past Surgical History   Past Surgical History:   Procedure Laterality Date    ABDOMINAL SURGERY      stents placed in liver and large intestines, per patient    CAROTID STENT      CORONARY STENT PLACEMENT  2000    DISSECTION OF NECK Bilateral 2022    Procedure: DISSECTION, NECK;  Surgeon: Naeem Smalls MD;  [facility];  Service: ENT;  Laterality: Bilateral;    ESOPHAGOGASTRODUODENOSCOPY W/ PEG N/A 2022    Procedure: EGD, WITH PEG TUBE INSERTION;  Surgeon: Anthony Calabrese MD;  [facility];  Service: General;  Laterality: N/A;    EYE SURGERY      Cataract bilateral    femoral stents      bilateral    FLAP PROCEDURE N/A 1/3/2022    Procedure: CREATION, FREE FLAP;  Surgeon: Naeem Smalls MD;  [facility];  Service: ENT;  Laterality: N/A;    FLAP PROCEDURE Right 2022    Procedure: CREATION, FREE FLAP;  Surgeon: Stacey Conde MD;  [facility];  Service: ENT;  Laterality: Right;  Ischemic start 1203  Ischemic stop 1353    GLOSSECTOMY N/A 2022    Procedure: GLOSSECTOMY;  Surgeon: Naeem Smalls MD;  [facility];  Service: ENT;  Laterality: N/A;    RADICAL NECK DISSECTION Left 1/3/2022    Procedure: DISSECTION, NECK, RADICAL;  Surgeon: Naeem Smalls MD;  [facility];  Service: ENT;  Laterality: Left;    SKIN CANCER EXCISION      TRACHEOTOMY N/A 2022    Procedure: TRACHEOTOMY;  Surgeon: Naeem Smalls MD;  Location: [facility] OR [address] [place] [address];  Service: ENT;  Laterality: N/A;         Family History   History reviewed. No pertinent family history.        Social History   .  Social History     Socioeconomic History    Marital status:    Tobacco Use    Smoking status: Former Smoker     Packs/day: 2.00     Years: 40.00     Pack years: 80.00     Types: Cigarettes     Start date: 1963     Quit date: 2018     Years since quittin.2    Smokeless tobacco:  Never Used    Tobacco comment: 3/3 ppd x 40 yrs. Currently 3-4 cigarettes daily .He is trying  to quit. Is using a Vapor cigarettes  2-3 x's a day.   Substance and Sexual Activity    Alcohol use: Not Currently     Alcohol/week: 3.0 standard drinks     Types: 3 Cans of beer per week     Comment: beer daily 3-4    Drug use: No    Sexual activity: Not Currently         Allergies   Review of patient's allergies indicates:  No Known Allergies        Physical Exam     Vitals:    06/30/22 1431   BP: 96/62   Pulse: 79   Temp: 96.8 °F (36 °C)         Body mass index is 20.21 kg/m².      General: AOx3, NAD   Respiratory:  Symmetric chest rise, normal effort  Oral cavity/oropharynx:  No worrisome lesions.  Flap well incorporated.  Neck:  Well-healed neck scars.  Size 6 uncuffed Shiley tracheostomy tube in place.  Exchanged for identical tube.  Face: House Brackmann I bilaterally.     Assessment/Plan  Problem List Items Addressed This Visit        Oncology    Squamous cell cancer of tongue - Primary     No evidence of disease.  Return in 6 weeks for surveillance

## 2022-07-28 ENCOUNTER — HOSPITAL ENCOUNTER (OUTPATIENT)
Dept: RADIOLOGY | Facility: HOSPITAL | Age: 72
Discharge: HOME | End: 2022-07-28
Attending: STUDENT IN AN ORGANIZED HEALTH CARE EDUCATION/TRAINING PROGRAM
Payer: MEDICARE

## 2022-07-28 ENCOUNTER — OFFICE VISIT (OUTPATIENT)
Dept: RADIATION ONCOLOGY | Facility: CLINIC | Age: 72
End: 2022-07-28
Payer: MEDICARE

## 2022-07-28 ENCOUNTER — OFFICE VISIT (OUTPATIENT)
Dept: OTOLARYNGOLOGY | Facility: CLINIC | Age: 72
End: 2022-07-28
Payer: MEDICARE

## 2022-07-28 VITALS
OXYGEN SATURATION: 97 % | DIASTOLIC BLOOD PRESSURE: 61 MMHG | SYSTOLIC BLOOD PRESSURE: 97 MMHG | RESPIRATION RATE: 18 BRPM | HEART RATE: 81 BPM | BODY MASS INDEX: 20 KG/M2 | HEIGHT: 68 IN | TEMPERATURE: 98 F | WEIGHT: 132 LBS

## 2022-07-28 VITALS — HEART RATE: 81 BPM | TEMPERATURE: 98 F | SYSTOLIC BLOOD PRESSURE: 97 MMHG | DIASTOLIC BLOOD PRESSURE: 61 MMHG

## 2022-07-28 DIAGNOSIS — C02.9 SQUAMOUS CELL CANCER OF TONGUE: ICD-10-CM

## 2022-07-28 DIAGNOSIS — C02.9 SQUAMOUS CELL CANCER OF TONGUE: Primary | ICD-10-CM

## 2022-07-28 DIAGNOSIS — R91.1 LUNG NODULE: ICD-10-CM

## 2022-07-28 PROCEDURE — 1101F PR PT FALLS ASSESS DOC 0-1 FALLS W/OUT INJ PAST YR: ICD-10-PCS | Mod: CPTII,S$GLB,, | Performed by: OTOLARYNGOLOGY

## 2022-07-28 PROCEDURE — 3078F DIAST BP <80 MM HG: CPT | Mod: CPTII,S$GLB,, | Performed by: OTOLARYNGOLOGY

## 2022-07-28 PROCEDURE — 70543 MRI SOFT TISSUE NECK W W/O CONTRAST: ICD-10-PCS | Mod: 26,,, | Performed by: RADIOLOGY

## 2022-07-28 PROCEDURE — 4010F ACE/ARB THERAPY RXD/TAKEN: CPT | Mod: CPTII,S$GLB,, | Performed by: OTOLARYNGOLOGY

## 2022-07-28 PROCEDURE — 99999 PR PBB SHADOW E&M-EST. PATIENT-LVL IV: CPT | Mod: PBBFAC,,, | Performed by: OTOLARYNGOLOGY

## 2022-07-28 PROCEDURE — 1160F PR REVIEW ALL MEDS BY PRESCRIBER/CLIN PHARMACIST DOCUMENTED: ICD-10-PCS | Mod: CPTII,S$GLB,, | Performed by: OTOLARYNGOLOGY

## 2022-07-28 PROCEDURE — 70543 MRI ORBT/FAC/NCK W/O &W/DYE: CPT | Mod: 26,,, | Performed by: RADIOLOGY

## 2022-07-28 PROCEDURE — 1160F RVW MEDS BY RX/DR IN RCRD: CPT | Mod: CPTII,S$GLB,, | Performed by: OTOLARYNGOLOGY

## 2022-07-28 PROCEDURE — 25500020 PHARM REV CODE 255: Performed by: STUDENT IN AN ORGANIZED HEALTH CARE EDUCATION/TRAINING PROGRAM

## 2022-07-28 PROCEDURE — 3288F FALL RISK ASSESSMENT DOCD: CPT | Mod: CPTII,S$GLB,, | Performed by: STUDENT IN AN ORGANIZED HEALTH CARE EDUCATION/TRAINING PROGRAM

## 2022-07-28 PROCEDURE — 1126F PR PAIN SEVERITY QUANTIFIED, NO PAIN PRESENT: ICD-10-PCS | Mod: CPTII,S$GLB,, | Performed by: STUDENT IN AN ORGANIZED HEALTH CARE EDUCATION/TRAINING PROGRAM

## 2022-07-28 PROCEDURE — 3078F PR MOST RECENT DIASTOLIC BLOOD PRESSURE < 80 MM HG: ICD-10-PCS | Mod: CPTII,S$GLB,, | Performed by: OTOLARYNGOLOGY

## 2022-07-28 PROCEDURE — 1159F PR MEDICATION LIST DOCUMENTED IN MEDICAL RECORD: ICD-10-PCS | Mod: CPTII,S$GLB,, | Performed by: OTOLARYNGOLOGY

## 2022-07-28 PROCEDURE — 4010F PR ACE/ARB THEARPY RXD/TAKEN: ICD-10-PCS | Mod: CPTII,S$GLB,, | Performed by: STUDENT IN AN ORGANIZED HEALTH CARE EDUCATION/TRAINING PROGRAM

## 2022-07-28 PROCEDURE — 99999 PR PBB SHADOW E&M-EST. PATIENT-LVL IV: CPT | Mod: PBBFAC,,, | Performed by: STUDENT IN AN ORGANIZED HEALTH CARE EDUCATION/TRAINING PROGRAM

## 2022-07-28 PROCEDURE — 1101F PR PT FALLS ASSESS DOC 0-1 FALLS W/OUT INJ PAST YR: ICD-10-PCS | Mod: CPTII,S$GLB,, | Performed by: STUDENT IN AN ORGANIZED HEALTH CARE EDUCATION/TRAINING PROGRAM

## 2022-07-28 PROCEDURE — 4010F PR ACE/ARB THEARPY RXD/TAKEN: ICD-10-PCS | Mod: CPTII,S$GLB,, | Performed by: OTOLARYNGOLOGY

## 2022-07-28 PROCEDURE — 99213 OFFICE O/P EST LOW 20 MIN: CPT | Mod: S$GLB,,, | Performed by: OTOLARYNGOLOGY

## 2022-07-28 PROCEDURE — 3074F SYST BP LT 130 MM HG: CPT | Mod: CPTII,S$GLB,, | Performed by: STUDENT IN AN ORGANIZED HEALTH CARE EDUCATION/TRAINING PROGRAM

## 2022-07-28 PROCEDURE — 1126F AMNT PAIN NOTED NONE PRSNT: CPT | Mod: CPTII,S$GLB,, | Performed by: OTOLARYNGOLOGY

## 2022-07-28 PROCEDURE — 99213 OFFICE O/P EST LOW 20 MIN: CPT | Mod: S$GLB,,, | Performed by: STUDENT IN AN ORGANIZED HEALTH CARE EDUCATION/TRAINING PROGRAM

## 2022-07-28 PROCEDURE — 3288F PR FALLS RISK ASSESSMENT DOCUMENTED: ICD-10-PCS | Mod: CPTII,S$GLB,, | Performed by: OTOLARYNGOLOGY

## 2022-07-28 PROCEDURE — 3008F BODY MASS INDEX DOCD: CPT | Mod: CPTII,S$GLB,, | Performed by: STUDENT IN AN ORGANIZED HEALTH CARE EDUCATION/TRAINING PROGRAM

## 2022-07-28 PROCEDURE — 3078F PR MOST RECENT DIASTOLIC BLOOD PRESSURE < 80 MM HG: ICD-10-PCS | Mod: CPTII,S$GLB,, | Performed by: STUDENT IN AN ORGANIZED HEALTH CARE EDUCATION/TRAINING PROGRAM

## 2022-07-28 PROCEDURE — 3074F SYST BP LT 130 MM HG: CPT | Mod: CPTII,S$GLB,, | Performed by: OTOLARYNGOLOGY

## 2022-07-28 PROCEDURE — 3008F PR BODY MASS INDEX (BMI) DOCUMENTED: ICD-10-PCS | Mod: CPTII,S$GLB,, | Performed by: STUDENT IN AN ORGANIZED HEALTH CARE EDUCATION/TRAINING PROGRAM

## 2022-07-28 PROCEDURE — 99213 PR OFFICE/OUTPT VISIT, EST, LEVL III, 20-29 MIN: ICD-10-PCS | Mod: S$GLB,,, | Performed by: STUDENT IN AN ORGANIZED HEALTH CARE EDUCATION/TRAINING PROGRAM

## 2022-07-28 PROCEDURE — 1159F MED LIST DOCD IN RCRD: CPT | Mod: CPTII,S$GLB,, | Performed by: OTOLARYNGOLOGY

## 2022-07-28 PROCEDURE — 3288F FALL RISK ASSESSMENT DOCD: CPT | Mod: CPTII,S$GLB,, | Performed by: OTOLARYNGOLOGY

## 2022-07-28 PROCEDURE — 99999 PR PBB SHADOW E&M-EST. PATIENT-LVL IV: ICD-10-PCS | Mod: PBBFAC,,, | Performed by: STUDENT IN AN ORGANIZED HEALTH CARE EDUCATION/TRAINING PROGRAM

## 2022-07-28 PROCEDURE — 3074F PR MOST RECENT SYSTOLIC BLOOD PRESSURE < 130 MM HG: ICD-10-PCS | Mod: CPTII,S$GLB,, | Performed by: STUDENT IN AN ORGANIZED HEALTH CARE EDUCATION/TRAINING PROGRAM

## 2022-07-28 PROCEDURE — 1126F PR PAIN SEVERITY QUANTIFIED, NO PAIN PRESENT: ICD-10-PCS | Mod: CPTII,S$GLB,, | Performed by: OTOLARYNGOLOGY

## 2022-07-28 PROCEDURE — 1101F PT FALLS ASSESS-DOCD LE1/YR: CPT | Mod: CPTII,S$GLB,, | Performed by: STUDENT IN AN ORGANIZED HEALTH CARE EDUCATION/TRAINING PROGRAM

## 2022-07-28 PROCEDURE — 1101F PT FALLS ASSESS-DOCD LE1/YR: CPT | Mod: CPTII,S$GLB,, | Performed by: OTOLARYNGOLOGY

## 2022-07-28 PROCEDURE — 3288F PR FALLS RISK ASSESSMENT DOCUMENTED: ICD-10-PCS | Mod: CPTII,S$GLB,, | Performed by: STUDENT IN AN ORGANIZED HEALTH CARE EDUCATION/TRAINING PROGRAM

## 2022-07-28 PROCEDURE — 1126F AMNT PAIN NOTED NONE PRSNT: CPT | Mod: CPTII,S$GLB,, | Performed by: STUDENT IN AN ORGANIZED HEALTH CARE EDUCATION/TRAINING PROGRAM

## 2022-07-28 PROCEDURE — 99213 PR OFFICE/OUTPT VISIT, EST, LEVL III, 20-29 MIN: ICD-10-PCS | Mod: S$GLB,,, | Performed by: OTOLARYNGOLOGY

## 2022-07-28 PROCEDURE — 3074F PR MOST RECENT SYSTOLIC BLOOD PRESSURE < 130 MM HG: ICD-10-PCS | Mod: CPTII,S$GLB,, | Performed by: OTOLARYNGOLOGY

## 2022-07-28 PROCEDURE — 4010F ACE/ARB THERAPY RXD/TAKEN: CPT | Mod: CPTII,S$GLB,, | Performed by: STUDENT IN AN ORGANIZED HEALTH CARE EDUCATION/TRAINING PROGRAM

## 2022-07-28 PROCEDURE — 70543 MRI ORBT/FAC/NCK W/O &W/DYE: CPT | Mod: TC

## 2022-07-28 PROCEDURE — 3078F DIAST BP <80 MM HG: CPT | Mod: CPTII,S$GLB,, | Performed by: STUDENT IN AN ORGANIZED HEALTH CARE EDUCATION/TRAINING PROGRAM

## 2022-07-28 PROCEDURE — A9585 GADOBUTROL INJECTION: HCPCS | Performed by: STUDENT IN AN ORGANIZED HEALTH CARE EDUCATION/TRAINING PROGRAM

## 2022-07-28 PROCEDURE — 99999 PR PBB SHADOW E&M-EST. PATIENT-LVL IV: ICD-10-PCS | Mod: PBBFAC,,, | Performed by: OTOLARYNGOLOGY

## 2022-07-28 RX ORDER — GADOBUTROL 604.72 MG/ML
6 INJECTION INTRAVENOUS
Status: COMPLETED | OUTPATIENT
Start: 2022-07-28 | End: 2022-07-28

## 2022-07-28 RX ADMIN — GADOBUTROL 6 ML: 604.72 INJECTION INTRAVENOUS at 12:07

## 2022-07-28 NOTE — PROGRESS NOTES
Chief Complaint   Patient presents with    Follow-up     Oncology History   Squamous cell cancer of tongue   12/16/2021 Initial Diagnosis    Squamous cell cancer of tongue     12/16/2021 Tumor Conference       His case was discussed at the Multidisciplinary Head and Neck Team Planning Meeting.    Representatives from Medical Oncology, Radiation Oncology, Head and Neck Surgical Oncology, Psychosocial Oncology, and Speech and Language Pathology discussed the case with the following recommendations:    1) surgery  2) work up lung nodule post op         1/2/2022 Cancer Staged    Staging form: Oral Cavity, AJCC 8th Edition  - Clinical stage from 1/2/2022: Stage NAFISA (cT2, cN2a, cM0)     1/4/2022 Cancer Staged    Staging form: Oral Cavity, AJCC 8th Edition  - Pathologic stage from 1/4/2022: Stage IVB (pT4a, pN3b, cM0)     2/28/2022 - 4/6/2022 Chemotherapy    Treatment Summary   Plan Name: OP HEAD NECK CISPLATIN WEEKLY + RADIOTHERAPY  Treatment Goal: Curative  Status: Inactive  Start Date: 2/28/2022  End Date: 4/6/2022  Provider: Christopher Jay MD  Chemotherapy: CISplatin (PLATINOL) 40 mg/m2 = 69 mg in sodium chloride 0.9% 500 mL chemo infusion, 40 mg/m2 = 69 mg, Intravenous, Clinic/HOD 1 time, 6 of 7 cycles  Administration: 69 mg (2/28/2022), 66 mg (3/7/2022), 69 mg (3/16/2022), 69 mg (3/23/2022), 69 mg (3/30/2022), 70 mg (4/6/2022)      Radiation Therapy    Treatment Summary  Course: C1 HN 2022  Treatment Site Ref. ID Energy Dose/Fx (Gy) #Fx Dose Correction (Gy) Total Dose (Gy) Start Date End Date Elapsed Days   IM H&N:1 PTV_66 6X 2 7 / 7 0 14 2/28/2022 3/10/2022 10   IM H&N2 PTV_66 6X 2 26 / 26 0 51.903 3/11/2022 4/22/2022 42        Secondary malignant neoplasm of cervical lymph node   2/16/2022 Initial Diagnosis    Secondary malignant neoplasm of cervical lymph node     2/28/2022 - 4/6/2022 Chemotherapy    Treatment Summary   Plan Name: OP HEAD NECK CISPLATIN WEEKLY + RADIOTHERAPY  Treatment Goal: Curative  Status:  Inactive  Start Date: 2/28/2022  End Date: 4/6/2022  Provider: Christopher Jay MD  Chemotherapy: CISplatin (PLATINOL) 40 mg/m2 = 69 mg in sodium chloride 0.9% 500 mL chemo infusion, 40 mg/m2 = 69 mg, Intravenous, Clinic/HOD 1 time, 6 of 7 cycles  Administration: 69 mg (2/28/2022), 66 mg (3/7/2022), 69 mg (3/16/2022), 69 mg (3/23/2022), 69 mg (3/30/2022), 70 mg (4/6/2022)           HPI   72 y.o. male presents with the above treatment history.  No new complaints.     Review of Systems   Constitutional: Negative for fatigue and unexpected weight change.   HENT: Per HPI.  Eyes: Negative for visual disturbance.   Respiratory: Negative for shortness of breath, hemoptysis   Cardiovascular: Negative for chest pain and palpitations.   Musculoskeletal: Negative for decreased ROM, back pain.   Skin: Negative for rash, sunburn, itching.   Neurological: Negative for dizziness and seizures.   Hematological: Negative for adenopathy. Does not bruise/bleed easily.   Endocrine: Negative for rapid weight loss/weight gain, heat/cold intolerance.     Past Medical History   Patient Active Problem List   Diagnosis    Peripheral vascular disease    Carotid stenosis    Squamous cell cancer of tongue    Coronary artery disease involving native coronary artery of native heart without angina pectoris    Primary hypertension    Other hyperlipidemia    Pre-operative cardiovascular examination    Impaired mobility and ADLs    Tracheobronchitis    Toxic effect of tobacco cigarette, intentional self-harm    COPD exacerbation    Normocytic anemia    Secondary malignant neoplasm of cervical lymph node    Protein-calorie malnutrition    Chronic respiratory failure with hypoxia, on home O2 therapy    Acute on chronic respiratory failure    Dysphagia    Sinus tachycardia    Aspiration pneumonia    Goals of care, counseling/discussion           Past Surgical History   Past Surgical History:   Procedure Laterality Date    ABDOMINAL  SURGERY      stents placed in liver and large intestines, per patient    CAROTID STENT      CORONARY STENT PLACEMENT  2000    DISSECTION OF NECK Bilateral 2022    Procedure: DISSECTION, NECK;  Surgeon: Naeem Smalls MD;  [facility];  Service: ENT;  Laterality: Bilateral;    ESOPHAGOGASTRODUODENOSCOPY W/ PEG N/A 2022    Procedure: EGD, WITH PEG TUBE INSERTION;  Surgeon: Anthony Calabrese MD;  [facility];  Service: General;  Laterality: N/A;    EYE SURGERY      Cataract bilateral    femoral stents      bilateral    FLAP PROCEDURE N/A 1/3/2022    Procedure: CREATION, FREE FLAP;  Surgeon: Naeem Smalls MD;  [facility];  Service: ENT;  Laterality: N/A;    FLAP PROCEDURE Right 2022    Procedure: CREATION, FREE FLAP;  Surgeon: Stacey Conde MD;  [facility];  Service: ENT;  Laterality: Right;  Ischemic start 1203  Ischemic stop 1353    GLOSSECTOMY N/A 2022    Procedure: GLOSSECTOMY;  Surgeon: Naeem Smalls MD;  [facility];  Service: ENT;  Laterality: N/A;    RADICAL NECK DISSECTION Left 1/3/2022    Procedure: DISSECTION, NECK, RADICAL;  Surgeon: Naeem Smalls MD;  [facility];  Service: ENT;  Laterality: Left;    SKIN CANCER EXCISION      TRACHEOTOMY N/A 2022    Procedure: TRACHEOTOMY;  Surgeon: Naeem Smalls MD;  [facility];  Service: ENT;  Laterality: N/A;         Family History   History reviewed. No pertinent family history.        Social History   .  Social History     Socioeconomic History    Marital status:    Tobacco Use    Smoking status: Former Smoker     Packs/day: 2.00     Years: 40.00     Pack years: 80.00     Types: Cigarettes     Start date: 1963     Quit date: 2018     Years since quittin.2    Smokeless tobacco: Never Used    Tobacco comment: 3/3 ppd x 40 yrs. Currently 3-4 cigarettes daily .He is trying  to  quit. Is using a Vapor cigarettes  2-3 x's a day.   Substance and Sexual Activity    Alcohol use: Not Currently     Alcohol/week: 3.0 standard drinks     Types: 3 Cans of beer per week     Comment: beer daily 3-4    Drug use: No    Sexual activity: Not Currently         Allergies   Review of patient's allergies indicates:  No Known Allergies        Physical Exam     Vitals:    07/28/22 1423   BP: 97/61   Pulse: 81   Temp: 98.1 °F (36.7 °C)         There is no height or weight on file to calculate BMI.      General: AOx3, NAD   Respiratory:  Symmetric chest rise, normal effort  Oral cavity/oropharynx:  No worrisome lesions.  Flap well incorporated.  Neck:  Well-healed neck scars.  Size 6 uncuffed Shiley tracheostomy tube in place.  No LAD.  Face: House Brackmann I bilaterally.     Assessment/Plan  Problem List Items Addressed This Visit        Oncology    Squamous cell cancer of tongue - Primary     HARVEY.  RTC 6 weeks.  Will change trach at that time.

## 2022-07-28 NOTE — PROGRESS NOTES
[facility]  Follow Up Visit Note    Diagnosis:  Fareed Richard Jr. is a 72 y.o. 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 He is s/p adjuvant chemoradiation to 66 Gy in 33 fractions, completed 4/22/22.    Oncologic History:  He has a history of tobacco use ~100 pack years.   · 12/13/21: met with head and neck surgery, with 3.5cm ulcerated left anteriolateral tongue mass. No buccal or FOM lesions. 3cm, firm, minimally mobile left submandibular adenopathy. No facial weakness.  ? FNA: of left neck mass with metastatic squamous cell carcinoma  · 1/4/22: DL, Trach, total glossectomy, bilateral neck dissection of 1-4 and resection of left submandibular cervical skin  ? Op note: extensive submucosal involvement of the vast majority of the tongue. DL with no other lesions.  Lingual nerve was identified.  Was taken to the skull base and found to be negative for carcinoma  ? Path: 6 cm primary, with 29 mm DOI, with invasion of the left neck soft tissue, submandibular gland and cervical skin. - SM , extensive PNI. 3/68 LN+ (ITC in left lvl II-IV, 1/5LN+ in left IB, with SALINAS+  · 1/4/22: anterolateral thigh free flap and advancement flap. Post op stay was complicated by positive respiratory cultures requiring ABx. He was transferred to rehab.   · 2/28/22-4/22/22: 66 Gy in 33 fractions to the region of SALINAS, 60 Gy in 30 fractions to the operative bed, level I, and left II-IV and 54 Gy to left lvl V and right II-IV.       Interval History  The patient presents today for a regularly scheduled follow up visit.  He was last seen in our clinic on 6/9/22. Since that visit, he met with Dr. Smalls with [facility] on exam.     He presents following post treatment MRI soft tissue neck (due to contrast shortage). Had an episode of bleeding from the trach ~ 1 week ago.  "Has since resolved. No new issues. No otalgia, skin changes, no facial numbness, weakness or parasthesias. Denies weight loss. Tolerating 6 cartons of food per day. Has continued secretions on scopolamine patch and glycopyrrolate.  Still NPO and following with speech therapist at home. No longer requiring PT/OT    Review of Systems   ROS  As above    Social History:  Social History     Tobacco Use    Smoking status: Former Smoker     Packs/day: 2.00     Years: 40.00     Pack years: 80.00     Types: Cigarettes     Start date: 1963     Quit date: 2018     Years since quittin.2    Smokeless tobacco: Never Used    Tobacco comment: 3/3 ppd x 40 yrs. Currently 3-4 cigarettes daily .He is trying  to quit. Is using a Vapor cigarettes  2-3 x's a day.   Substance Use Topics    Alcohol use: Not Currently     Alcohol/week: 3.0 standard drinks     Types: 3 Cans of beer per week     Comment: beer daily 3-4    Drug use: No       Exam:  Vitals:    22 1357   BP: 97/61   BP Location: Right arm   Patient Position: Sitting   BP Method: Medium (Automatic)   Pulse: 81   Resp: 18   Temp: 98.1 °F (36.7 °C)   SpO2: 97%   Weight: 59.9 kg (132 lb)   Height: 5' 8" (1.727 m)     Constitutional: Pleasant 72 y.o. male in no acute distress.  Well nourished. In a wheelchair.   HEENT: s/p total glossectomy, with intact flap. No appreciated bleeding or ulcerated lesions on direct exam. Dry mucus membranes. Skin is well healed without any suspicious lesions. No appreciated cervical adenopathy but he has fibrosis of the bilateral neck. Trach in place  Lungs: No audible wheezing.  Normal effort.   Musculoskeletal: No gross MSK deformities.  GI: PEG tube without drainage or surrounding erythema  Skin: No rashes appreciated.   Psych: Alert and oriented with appropriate mood and affect.  Neuro:  Grossly normal.    Data Review:  Information obtained via chart review and discussion with Mr. Richard and his " wife.    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 He is s/p adjuvant chemoradiation to 66 Gy in 33 fractions, completed 4/22/22.  · 3 months post adjuvant CRT  · TSH has been ordered   ECOG: (2) Ambulatory and capable of self care, unable to carry out work activity, up and about > 50% or waking hours    Plan:   Will follow up MRI, not able to view images at this time.   Will tentatively plan to have him RTC in 3 months or earlier PRN      Addendum:  MRI 7/28/22 Impression:     Severely motion distorted examination operative change from glossectomy, bilateral neck dissection and flap reconstruction.     Scattered edema within the superficial neck soft tissues and hypopharynx extending to the larynx which may represent post treatment change.     Please note there is a small left pleural effusion as well as nodular consolidation in the visualized left upper lobe concerning for possible pulmonary metastases the.  Clinical correlation and further evaluation dedicated CT thorax advised     Please note study is significantly suboptimal secondary to motion artifacts and consideration for further evaluation with PET CT advised.    Given lung nodule and suboptimal evaluation of H&N will put in for PET/CT and see them after.    Zaki Brunson MD  Radiation Oncology

## 2022-08-18 ENCOUNTER — OFFICE VISIT (OUTPATIENT)
Dept: RADIATION ONCOLOGY | Facility: CLINIC | Age: 72
End: 2022-08-18
Payer: MEDICARE

## 2022-08-18 ENCOUNTER — HOSPITAL ENCOUNTER (OUTPATIENT)
Dept: RADIOLOGY | Facility: HOSPITAL | Age: 72
Discharge: HOME | End: 2022-08-18
Attending: STUDENT IN AN ORGANIZED HEALTH CARE EDUCATION/TRAINING PROGRAM
Payer: MEDICARE

## 2022-08-18 VITALS
BODY MASS INDEX: 20.16 KG/M2 | RESPIRATION RATE: 17 BRPM | HEIGHT: 68 IN | SYSTOLIC BLOOD PRESSURE: 97 MMHG | WEIGHT: 133.06 LBS | OXYGEN SATURATION: 88 % | HEART RATE: 76 BPM | DIASTOLIC BLOOD PRESSURE: 53 MMHG | TEMPERATURE: 98 F

## 2022-08-18 DIAGNOSIS — R68.89 EXCESSIVE ORAL SECRETIONS: ICD-10-CM

## 2022-08-18 DIAGNOSIS — C02.9 SQUAMOUS CELL CANCER OF TONGUE: ICD-10-CM

## 2022-08-18 DIAGNOSIS — I51.9 HEART DISEASE: ICD-10-CM

## 2022-08-18 DIAGNOSIS — J44.9 CHRONIC OBSTRUCTIVE PULMONARY DISEASE, UNSPECIFIED COPD TYPE: Primary | ICD-10-CM

## 2022-08-18 DIAGNOSIS — R91.1 LUNG NODULE: ICD-10-CM

## 2022-08-18 LAB — POCT GLUCOSE: 106 MG/DL (ref 70–110)

## 2022-08-18 PROCEDURE — 99999 PR PBB SHADOW E&M-EST. PATIENT-LVL IV: ICD-10-PCS | Mod: PBBFAC,,, | Performed by: STUDENT IN AN ORGANIZED HEALTH CARE EDUCATION/TRAINING PROGRAM

## 2022-08-18 PROCEDURE — 1101F PT FALLS ASSESS-DOCD LE1/YR: CPT | Mod: CPTII,S$GLB,, | Performed by: STUDENT IN AN ORGANIZED HEALTH CARE EDUCATION/TRAINING PROGRAM

## 2022-08-18 PROCEDURE — 78815 PET IMAGE W/CT SKULL-THIGH: CPT | Mod: 26,PS,, | Performed by: RADIOLOGY

## 2022-08-18 PROCEDURE — 3078F DIAST BP <80 MM HG: CPT | Mod: CPTII,S$GLB,, | Performed by: STUDENT IN AN ORGANIZED HEALTH CARE EDUCATION/TRAINING PROGRAM

## 2022-08-18 PROCEDURE — 1126F PR PAIN SEVERITY QUANTIFIED, NO PAIN PRESENT: ICD-10-PCS | Mod: CPTII,S$GLB,, | Performed by: STUDENT IN AN ORGANIZED HEALTH CARE EDUCATION/TRAINING PROGRAM

## 2022-08-18 PROCEDURE — 78815 NM PET CT ROUTINE: ICD-10-PCS | Mod: 26,PS,, | Performed by: RADIOLOGY

## 2022-08-18 PROCEDURE — 1126F AMNT PAIN NOTED NONE PRSNT: CPT | Mod: CPTII,S$GLB,, | Performed by: STUDENT IN AN ORGANIZED HEALTH CARE EDUCATION/TRAINING PROGRAM

## 2022-08-18 PROCEDURE — 1159F MED LIST DOCD IN RCRD: CPT | Mod: CPTII,S$GLB,, | Performed by: STUDENT IN AN ORGANIZED HEALTH CARE EDUCATION/TRAINING PROGRAM

## 2022-08-18 PROCEDURE — 4010F PR ACE/ARB THEARPY RXD/TAKEN: ICD-10-PCS | Mod: CPTII,S$GLB,, | Performed by: STUDENT IN AN ORGANIZED HEALTH CARE EDUCATION/TRAINING PROGRAM

## 2022-08-18 PROCEDURE — 3288F FALL RISK ASSESSMENT DOCD: CPT | Mod: CPTII,S$GLB,, | Performed by: STUDENT IN AN ORGANIZED HEALTH CARE EDUCATION/TRAINING PROGRAM

## 2022-08-18 PROCEDURE — 1159F PR MEDICATION LIST DOCUMENTED IN MEDICAL RECORD: ICD-10-PCS | Mod: CPTII,S$GLB,, | Performed by: STUDENT IN AN ORGANIZED HEALTH CARE EDUCATION/TRAINING PROGRAM

## 2022-08-18 PROCEDURE — 99213 OFFICE O/P EST LOW 20 MIN: CPT | Mod: S$GLB,,, | Performed by: STUDENT IN AN ORGANIZED HEALTH CARE EDUCATION/TRAINING PROGRAM

## 2022-08-18 PROCEDURE — 1101F PR PT FALLS ASSESS DOC 0-1 FALLS W/OUT INJ PAST YR: ICD-10-PCS | Mod: CPTII,S$GLB,, | Performed by: STUDENT IN AN ORGANIZED HEALTH CARE EDUCATION/TRAINING PROGRAM

## 2022-08-18 PROCEDURE — 3074F PR MOST RECENT SYSTOLIC BLOOD PRESSURE < 130 MM HG: ICD-10-PCS | Mod: CPTII,S$GLB,, | Performed by: STUDENT IN AN ORGANIZED HEALTH CARE EDUCATION/TRAINING PROGRAM

## 2022-08-18 PROCEDURE — 99999 PR PBB SHADOW E&M-EST. PATIENT-LVL IV: CPT | Mod: PBBFAC,,, | Performed by: STUDENT IN AN ORGANIZED HEALTH CARE EDUCATION/TRAINING PROGRAM

## 2022-08-18 PROCEDURE — 3078F PR MOST RECENT DIASTOLIC BLOOD PRESSURE < 80 MM HG: ICD-10-PCS | Mod: CPTII,S$GLB,, | Performed by: STUDENT IN AN ORGANIZED HEALTH CARE EDUCATION/TRAINING PROGRAM

## 2022-08-18 PROCEDURE — 3008F PR BODY MASS INDEX (BMI) DOCUMENTED: ICD-10-PCS | Mod: CPTII,S$GLB,, | Performed by: STUDENT IN AN ORGANIZED HEALTH CARE EDUCATION/TRAINING PROGRAM

## 2022-08-18 PROCEDURE — 4010F ACE/ARB THERAPY RXD/TAKEN: CPT | Mod: CPTII,S$GLB,, | Performed by: STUDENT IN AN ORGANIZED HEALTH CARE EDUCATION/TRAINING PROGRAM

## 2022-08-18 PROCEDURE — 99213 PR OFFICE/OUTPT VISIT, EST, LEVL III, 20-29 MIN: ICD-10-PCS | Mod: S$GLB,,, | Performed by: STUDENT IN AN ORGANIZED HEALTH CARE EDUCATION/TRAINING PROGRAM

## 2022-08-18 PROCEDURE — 3074F SYST BP LT 130 MM HG: CPT | Mod: CPTII,S$GLB,, | Performed by: STUDENT IN AN ORGANIZED HEALTH CARE EDUCATION/TRAINING PROGRAM

## 2022-08-18 PROCEDURE — 3288F PR FALLS RISK ASSESSMENT DOCUMENTED: ICD-10-PCS | Mod: CPTII,S$GLB,, | Performed by: STUDENT IN AN ORGANIZED HEALTH CARE EDUCATION/TRAINING PROGRAM

## 2022-08-18 PROCEDURE — 3008F BODY MASS INDEX DOCD: CPT | Mod: CPTII,S$GLB,, | Performed by: STUDENT IN AN ORGANIZED HEALTH CARE EDUCATION/TRAINING PROGRAM

## 2022-08-18 PROCEDURE — 78815 PET IMAGE W/CT SKULL-THIGH: CPT | Mod: PS,TC

## 2022-08-18 RX ORDER — GLYCOPYRROLATE 1 MG/5ML
1 SOLUTION ORAL 3 TIMES DAILY PRN
Qty: 473 ML | Refills: 1 | Status: SHIPPED | OUTPATIENT
Start: 2022-08-18 | End: 2022-12-13

## 2022-08-18 RX ORDER — GLYCOPYRROLATE 1 MG/5ML
1 SOLUTION ORAL 3 TIMES DAILY PRN
Qty: 473 ML | Refills: 1 | Status: SHIPPED | OUTPATIENT
Start: 2022-08-18 | End: 2022-08-18 | Stop reason: SDUPTHER

## 2022-08-18 NOTE — PROGRESS NOTES
[facility]  Follow Up Visit Note    Diagnosis:  Fareed Richard Jr. is a 72 y.o. 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 He is s/p adjuvant chemoradiation to 66 Gy in 33 fractions, completed 4/22/22.    Oncologic History:  He has a history of tobacco use ~100 pack years.   · 12/13/21: met with head and neck surgery, with 3.5cm ulcerated left anteriolateral tongue mass. No buccal or FOM lesions. 3cm, firm, minimally mobile left submandibular adenopathy. No facial weakness.  ? FNA: of left neck mass with metastatic squamous cell carcinoma  · 1/4/22: DL, Trach, total glossectomy, bilateral neck dissection of 1-4 and resection of left submandibular cervical skin  ? Op note: extensive submucosal involvement of the vast majority of the tongue. DL with no other lesions.  Lingual nerve was identified.  Was taken to the skull base and found to be negative for carcinoma  ? Path: 6 cm primary, with 29 mm DOI, with invasion of the left neck soft tissue, submandibular gland and cervical skin. - SM , extensive PNI. 3/68 LN+ (ITC in left lvl II-IV, 1/5LN+ in left IB, with SALINAS+  · 1/4/22: anterolateral thigh free flap and advancement flap. Post op stay was complicated by positive respiratory cultures requiring ABx. He was transferred to rehab.   · 2/28/22-4/22/22: 66 Gy in 33 fractions to the region of SALINAS, 60 Gy in 30 fractions to the operative bed, level I, and left II-IV and 54 Gy to left lvl V and right II-IV.       Interval History  The patient presents today for a regularly scheduled follow up visit.  He was last seen in our clinic on 7/28/22. Evaluation of MRI obtained post treatment was limited by motion distortion. PET was recommended. He returns today with PET/CT    No new issues, hemoptysis, ear pain.  No bleeding from the trach. His main  "complaint is persistent shortness of breath and decreased energy. He denies fevers and worsening cough. No hx of COVID (his wife works in a nursing home and he checks weekly, all have been negative). He is on 3 L currently but on 5L at home (his baseline). He sleeps in hospital bed with head of bed elevation.     Review of Systems   ROS  As above    Social History:  Social History     Tobacco Use    Smoking status: Former Smoker     Packs/day: 2.00     Years: 40.00     Pack years: 80.00     Types: Cigarettes     Start date: 1963     Quit date: 2018     Years since quittin.3    Smokeless tobacco: Never Used    Tobacco comment: 3/3 ppd x 40 yrs. Currently 3-4 cigarettes daily .He is trying  to quit. Is using a Vapor cigarettes  2-3 x's a day.   Substance Use Topics    Alcohol use: Not Currently     Alcohol/week: 3.0 standard drinks     Types: 3 Cans of beer per week     Comment: beer daily 3-4    Drug use: No       Exam:  Vitals:    22 1056   BP: (!) 97/53   BP Location: Right arm   Patient Position: Sitting   Pulse: 76   Resp: 17   Temp: 97.8 °F (36.6 °C)   SpO2: (!) 88%   Weight: 60.3 kg (133 lb 0.8 oz)   Height: 5' 8" (1.727 m)     Pulse ox 95% on re check  Constitutional: Pleasant 72 y.o. male in no acute distress.  Well nourished. In a wheelchair.   HEENT: s/p total glossectomy, with intact flap. Shallow based area of trauma in the bilateral soft palate without appreciated underlying nodularity (he does aggressive suctioning in this area). Dry mucus membranes. Skin is well healed without any suspicious lesions. No appreciated cervical adenopathy but he has fibrosis of the bilateral neck. Trach in place without any appreciated superficial lesions. No bleeding.   Lungs: No audible wheezing.  Normal effort.   Musculoskeletal: No gross MSK deformities. No LE edema  GI: PEG tube without drainage or surrounding erythema  Skin: No rashes appreciated.   Psych: Alert and oriented with appropriate " mood and affect.  Neuro:  Grossly normal.    Data Review:  Information obtained via chart review and discussion with Mr. Richard and his wife.    PET/CT from today was personally reviewed. No appreciated tongue or cutaneous masses.     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 He is s/p adjuvant chemoradiation to 66 Gy in 33 fractions, completed 4/22/22.  · Post treatment PET with final read pending. No appreciated tongue or cutaneous masses or avidity on personal review. There is some uptake in the masseter pterygoid muscle. Increased uptake in the lungs and mediastinal nodes, inflammatory?  · Low energy, TSH has been ordered   ECOG: (2) Ambulatory and capable of self care, unable to carry out work activity, up and about > 50% or waking hours    Plan:  · He will see Dr. Smalls on 9/8/22 and Dr. Jay on 8/24/22.   · Follow up final read of PET, RTC 6 weeks or earlier PRN  · Has persistent shortness of breath, will set him up with pulmonology    Zaki Brunson MD  Radiation Oncology

## 2022-08-19 ENCOUNTER — TELEPHONE (OUTPATIENT)
Dept: RADIATION ONCOLOGY | Facility: CLINIC | Age: 72
End: 2022-08-19
Payer: MEDICARE

## 2022-08-19 NOTE — TELEPHONE ENCOUNTER
Called and relayed results. Will refer to Pulm given lung findings. He also has a history of CAD s/p stents and findings consistent with pulmonary edema. They have not been following with cardiology, so will get them back to see cardiology as well.    Zaki Brunson MD  Radiation Oncology

## 2022-08-24 ENCOUNTER — OFFICE VISIT (OUTPATIENT)
Dept: HEMATOLOGY/ONCOLOGY | Facility: CLINIC | Age: 72
End: 2022-08-24
Payer: MEDICARE

## 2022-08-24 ENCOUNTER — LAB VISIT (OUTPATIENT)
Dept: LAB | Facility: HOSPITAL | Age: 72
End: 2022-08-24
Attending: STUDENT IN AN ORGANIZED HEALTH CARE EDUCATION/TRAINING PROGRAM
Payer: MEDICARE

## 2022-08-24 VITALS
SYSTOLIC BLOOD PRESSURE: 96 MMHG | BODY MASS INDEX: 20.23 KG/M2 | HEART RATE: 65 BPM | DIASTOLIC BLOOD PRESSURE: 50 MMHG | HEIGHT: 68 IN | RESPIRATION RATE: 22 BRPM | OXYGEN SATURATION: 97 %

## 2022-08-24 DIAGNOSIS — E03.2 HYPOTHYROIDISM DUE TO MEDICAMENTS AND OTHER EXOGENOUS SUBSTANCES: ICD-10-CM

## 2022-08-24 DIAGNOSIS — D64.9 ANEMIA, UNSPECIFIED TYPE: ICD-10-CM

## 2022-08-24 DIAGNOSIS — R91.8 PULMONARY INFILTRATE: ICD-10-CM

## 2022-08-24 DIAGNOSIS — C02.9 SQUAMOUS CELL CANCER OF TONGUE: Primary | ICD-10-CM

## 2022-08-24 DIAGNOSIS — Z92.3 H/O HEAD AND NECK RADIATION: ICD-10-CM

## 2022-08-24 DIAGNOSIS — C02.9 SQUAMOUS CELL CANCER OF TONGUE: ICD-10-CM

## 2022-08-24 LAB
ALBUMIN SERPL BCP-MCNC: 2.5 G/DL (ref 3.5–5.2)
ALP SERPL-CCNC: 81 U/L (ref 55–135)
ALT SERPL W/O P-5'-P-CCNC: 15 U/L (ref 10–44)
ANION GAP SERPL CALC-SCNC: 8 MMOL/L (ref 8–16)
AST SERPL-CCNC: 19 U/L (ref 10–40)
BASOPHILS # BLD AUTO: 0.03 K/UL (ref 0–0.2)
BASOPHILS NFR BLD: 0.5 % (ref 0–1.9)
BILIRUB SERPL-MCNC: 0.2 MG/DL (ref 0.1–1)
BUN SERPL-MCNC: 23 MG/DL (ref 8–23)
CALCIUM SERPL-MCNC: 9.6 MG/DL (ref 8.7–10.5)
CHLORIDE SERPL-SCNC: 94 MMOL/L (ref 95–110)
CO2 SERPL-SCNC: 34 MMOL/L (ref 23–29)
CREAT SERPL-MCNC: 0.7 MG/DL (ref 0.5–1.4)
DIFFERENTIAL METHOD: ABNORMAL
EOSINOPHIL # BLD AUTO: 0.5 K/UL (ref 0–0.5)
EOSINOPHIL NFR BLD: 8.3 % (ref 0–8)
ERYTHROCYTE [DISTWIDTH] IN BLOOD BY AUTOMATED COUNT: 16.1 % (ref 11.5–14.5)
EST. GFR  (NO RACE VARIABLE): >60 ML/MIN/1.73 M^2
GLUCOSE SERPL-MCNC: 134 MG/DL (ref 70–110)
HCT VFR BLD AUTO: 26.8 % (ref 40–54)
HGB BLD-MCNC: 7.7 G/DL (ref 14–18)
IMM GRANULOCYTES # BLD AUTO: 0.01 K/UL (ref 0–0.04)
IMM GRANULOCYTES NFR BLD AUTO: 0.2 % (ref 0–0.5)
LYMPHOCYTES # BLD AUTO: 0.4 K/UL (ref 1–4.8)
LYMPHOCYTES NFR BLD: 7.1 % (ref 18–48)
MCH RBC QN AUTO: 25.1 PG (ref 27–31)
MCHC RBC AUTO-ENTMCNC: 28.7 G/DL (ref 32–36)
MCV RBC AUTO: 87 FL (ref 82–98)
MONOCYTES # BLD AUTO: 0.7 K/UL (ref 0.3–1)
MONOCYTES NFR BLD: 10.8 % (ref 4–15)
NEUTROPHILS # BLD AUTO: 4.5 K/UL (ref 1.8–7.7)
NEUTROPHILS NFR BLD: 73.1 % (ref 38–73)
NRBC BLD-RTO: 0 /100 WBC
PLATELET # BLD AUTO: 302 K/UL (ref 150–450)
PMV BLD AUTO: 9 FL (ref 9.2–12.9)
POTASSIUM SERPL-SCNC: 4.6 MMOL/L (ref 3.5–5.1)
PROT SERPL-MCNC: 7.3 G/DL (ref 6–8.4)
RBC # BLD AUTO: 3.07 M/UL (ref 4.6–6.2)
SODIUM SERPL-SCNC: 136 MMOL/L (ref 136–145)
TSH SERPL DL<=0.005 MIU/L-ACNC: 2.28 UIU/ML (ref 0.4–4)
WBC # BLD AUTO: 6.18 K/UL (ref 3.9–12.7)

## 2022-08-24 PROCEDURE — 80053 COMPREHEN METABOLIC PANEL: CPT | Performed by: STUDENT IN AN ORGANIZED HEALTH CARE EDUCATION/TRAINING PROGRAM

## 2022-08-24 PROCEDURE — 99215 PR OFFICE/OUTPT VISIT, EST, LEVL V, 40-54 MIN: ICD-10-PCS | Mod: GC,S$GLB,, | Performed by: STUDENT IN AN ORGANIZED HEALTH CARE EDUCATION/TRAINING PROGRAM

## 2022-08-24 PROCEDURE — 36415 COLL VENOUS BLD VENIPUNCTURE: CPT | Performed by: STUDENT IN AN ORGANIZED HEALTH CARE EDUCATION/TRAINING PROGRAM

## 2022-08-24 PROCEDURE — 4010F PR ACE/ARB THEARPY RXD/TAKEN: ICD-10-PCS | Mod: CPTII,S$GLB,, | Performed by: STUDENT IN AN ORGANIZED HEALTH CARE EDUCATION/TRAINING PROGRAM

## 2022-08-24 PROCEDURE — 3078F PR MOST RECENT DIASTOLIC BLOOD PRESSURE < 80 MM HG: ICD-10-PCS | Mod: CPTII,S$GLB,, | Performed by: STUDENT IN AN ORGANIZED HEALTH CARE EDUCATION/TRAINING PROGRAM

## 2022-08-24 PROCEDURE — 99999 PR PBB SHADOW E&M-EST. PATIENT-LVL III: ICD-10-PCS | Mod: PBBFAC,,, | Performed by: STUDENT IN AN ORGANIZED HEALTH CARE EDUCATION/TRAINING PROGRAM

## 2022-08-24 PROCEDURE — 1101F PT FALLS ASSESS-DOCD LE1/YR: CPT | Mod: CPTII,S$GLB,, | Performed by: STUDENT IN AN ORGANIZED HEALTH CARE EDUCATION/TRAINING PROGRAM

## 2022-08-24 PROCEDURE — 1101F PR PT FALLS ASSESS DOC 0-1 FALLS W/OUT INJ PAST YR: ICD-10-PCS | Mod: CPTII,S$GLB,, | Performed by: STUDENT IN AN ORGANIZED HEALTH CARE EDUCATION/TRAINING PROGRAM

## 2022-08-24 PROCEDURE — 1126F PR PAIN SEVERITY QUANTIFIED, NO PAIN PRESENT: ICD-10-PCS | Mod: CPTII,S$GLB,, | Performed by: STUDENT IN AN ORGANIZED HEALTH CARE EDUCATION/TRAINING PROGRAM

## 2022-08-24 PROCEDURE — 4010F ACE/ARB THERAPY RXD/TAKEN: CPT | Mod: CPTII,S$GLB,, | Performed by: STUDENT IN AN ORGANIZED HEALTH CARE EDUCATION/TRAINING PROGRAM

## 2022-08-24 PROCEDURE — 3288F PR FALLS RISK ASSESSMENT DOCUMENTED: ICD-10-PCS | Mod: CPTII,S$GLB,, | Performed by: STUDENT IN AN ORGANIZED HEALTH CARE EDUCATION/TRAINING PROGRAM

## 2022-08-24 PROCEDURE — 85025 COMPLETE CBC W/AUTO DIFF WBC: CPT | Performed by: STUDENT IN AN ORGANIZED HEALTH CARE EDUCATION/TRAINING PROGRAM

## 2022-08-24 PROCEDURE — 3074F SYST BP LT 130 MM HG: CPT | Mod: CPTII,S$GLB,, | Performed by: STUDENT IN AN ORGANIZED HEALTH CARE EDUCATION/TRAINING PROGRAM

## 2022-08-24 PROCEDURE — 3074F PR MOST RECENT SYSTOLIC BLOOD PRESSURE < 130 MM HG: ICD-10-PCS | Mod: CPTII,S$GLB,, | Performed by: STUDENT IN AN ORGANIZED HEALTH CARE EDUCATION/TRAINING PROGRAM

## 2022-08-24 PROCEDURE — 99215 OFFICE O/P EST HI 40 MIN: CPT | Mod: GC,S$GLB,, | Performed by: STUDENT IN AN ORGANIZED HEALTH CARE EDUCATION/TRAINING PROGRAM

## 2022-08-24 PROCEDURE — 84443 ASSAY THYROID STIM HORMONE: CPT | Performed by: STUDENT IN AN ORGANIZED HEALTH CARE EDUCATION/TRAINING PROGRAM

## 2022-08-24 PROCEDURE — 3008F BODY MASS INDEX DOCD: CPT | Mod: CPTII,S$GLB,, | Performed by: STUDENT IN AN ORGANIZED HEALTH CARE EDUCATION/TRAINING PROGRAM

## 2022-08-24 PROCEDURE — 3008F PR BODY MASS INDEX (BMI) DOCUMENTED: ICD-10-PCS | Mod: CPTII,S$GLB,, | Performed by: STUDENT IN AN ORGANIZED HEALTH CARE EDUCATION/TRAINING PROGRAM

## 2022-08-24 PROCEDURE — 3288F FALL RISK ASSESSMENT DOCD: CPT | Mod: CPTII,S$GLB,, | Performed by: STUDENT IN AN ORGANIZED HEALTH CARE EDUCATION/TRAINING PROGRAM

## 2022-08-24 PROCEDURE — 99999 PR PBB SHADOW E&M-EST. PATIENT-LVL III: CPT | Mod: PBBFAC,,, | Performed by: STUDENT IN AN ORGANIZED HEALTH CARE EDUCATION/TRAINING PROGRAM

## 2022-08-24 PROCEDURE — 1126F AMNT PAIN NOTED NONE PRSNT: CPT | Mod: CPTII,S$GLB,, | Performed by: STUDENT IN AN ORGANIZED HEALTH CARE EDUCATION/TRAINING PROGRAM

## 2022-08-24 PROCEDURE — 3078F DIAST BP <80 MM HG: CPT | Mod: CPTII,S$GLB,, | Performed by: STUDENT IN AN ORGANIZED HEALTH CARE EDUCATION/TRAINING PROGRAM

## 2022-08-24 RX ORDER — PREDNISONE 5 MG/ML
SOLUTION ORAL
Qty: 1120 ML | Refills: 0 | Status: SHIPPED | OUTPATIENT
Start: 2022-08-24 | End: 2022-09-16 | Stop reason: SDUPTHER

## 2022-08-24 NOTE — ASSESSMENT & PLAN NOTE
Patient with reports of worsening shortness of breath since completion of his chemotherapy in April. Patient and wife note mild improvement after hospitalization in May after antibiotic treatment, however improvement was transient and now patient is endorsing significant SERRANO and fatigue. Chest imaging significant for worsening pulmonary infiltrates. Patient has tested negative for COVID multiple times, no known sick contacts, fevers, or chills. Wife notes that patient has occasional vomiting and cough however findings are bilateral.     - Will start patient on prednisone taper starting with 60 mg x 1 week. Will decrease to 40 mg x 1 week and then decrease by 10 mg weekly until discontinuation  - Will get EST, CRP  - Will get PFT within the next 2 weeks  - Repeat CT chest in 8 weeks to monitor progress  - Will refer to pulmonology once testing is complete

## 2022-08-24 NOTE — ASSESSMENT & PLAN NOTE
Patient with long standing anemia with baseline anywhere between Hg anywhere between 8-10. B12 and folate normal in February of 2022. Patient with complaints of increased fatigue and SERRANO. No noted blood loss by patient's wife (melena or BRBPR).  - Will get iron studies and ferritin, replenish if needed  - Continue folate supplement  - Assuming there is an element of ACD, hopefully blood counts will rise as patient is treated with steroids

## 2022-08-24 NOTE — PROGRESS NOTES
PATIENT: Fareed Richard Jr.  MRN: [id number]  DATE: 8/24/2022      Diagnosis:   1. Anemia, unspecified type    2. Pulmonary infiltrate    3. Squamous cell cancer of tongue    4. 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      Oncologic History:    Oncologic History 1. Squamous cell carcinoma of tongue    Oncologic Treatment 1.  1/4/22 total glossectomy, bilateral neck dissection, bilateral cervical facial advancement flaps and anterolateral thigh free flap reconstruction of his glossectomy defect   2. 2/28/22-4/20/22 adjuvant chemoradiation with cisplatin    Pathology Squamous cell carcinoma, poorly differentiated, p16 negative  Final pathology: pT4a:  Moderately advanced local disease.  Tumor size = 6.0 cm with DOI = 29 mm and invades adjacent structures.   pN3b:  Metastasis in single contralateral node, SALINAS(+).   pM:  Not applicable.   Superior soft tissue margin of the left neck is involved by tumor           Subjective:    Interval History: Mr. Richard is here for follow up    Oncological History copied from medical chart: Initially saw Aletha Cook NP (ENT) 11/29/21 regarding non-healing left sided tongue lesion for about 6 weeks.  Associated symptoms included tongue pain, bleeding, left otalgia, weight loss from difficulty eating, and bump under left chin that enlarged and was refractory to oral antibiotics and nystatin.   History significant for skin cancer of the left cheek and right lower lip about 5-6 years ago. He reported he had a flap done with Dr. Starks. His wife notes that one of the cancers was SCC and the other was basal cell but she cannot call which was which. 12/6/21 CT neck was done (findings below). 12/15/21 he saw Dr. Smalls who performed an FNA of his left neck mass, which finalized as squamous cell carcinoma, p16 negative.   12/16/21 CT chest without contrast was done (findings below).  1/4/22 underwent total  glossectomy, bilateral neck dissection, bilateral cervical facial advancement flaps and anterolateral thigh free flap reconstruction of his glossectomy defect.  Final pathology [id number], +SALINAS (microscopic), +margin (superior soft tissue margin of left neck).    He completed chemoradiation 4/20/22    Interval History:   Patient's primary issue is worsening shortness of breath, especially with exertion. This has been an ongoing issue since completion of his chemotherapy in April. He was admitted to hospital and treated for pneumonia. Patient and wife reports mildly improved symptoms for a short period of time but his SOB and fatigue has been getting worse since. Patient remains on 3L NC while on portable tank and on 5L at home. He has tested negative for COVID multiple times, denies any known sick contacts, consistent fevers or chills. Wife reports that patient has vomited occasionally with coughing fits. His weight has remained stable at 133 lb, continues to take 6 cans through G tube. Recent NM PET imaging shows no evidence of active HNSCC; multiple hypermetabolic mediastinal lymph nodes noted. In addition patient with Hg of 7.7. His baseline is anywhere between 8-10, though Hg has been as low as 6.8.     Past Medical History:   Past Medical History:   Diagnosis Date    Cancer     skin to Rt forearm and face    COPD (chronic obstructive pulmonary disease)     Hyperlipidemia     Hypertension     Pseudoaneurysm        Past Surgical HIstory:   Past Surgical History:   Procedure Laterality Date    ABDOMINAL SURGERY      stents placed in liver and large intestines, per patient    CAROTID STENT      CORONARY STENT PLACEMENT  01/2000    DISSECTION OF NECK Bilateral 1/4/2022    Procedure: DISSECTION, NECK;  Surgeon: Naeem Smalls MD;  Location: [facility] OR [address] [place] [address];  Service: ENT;  Laterality: Bilateral;    ESOPHAGOGASTRODUODENOSCOPY W/ PEG N/A 1/4/2022    Procedure: EGD, WITH PEG TUBE INSERTION;  Surgeon:  Anthony Calabrese MD;  Location: [address];  Service: General;  Laterality: N/A;    EYE SURGERY      Cataract bilateral    femoral stents      bilateral    FLAP PROCEDURE N/A 1/3/2022    Procedure: CREATION, FREE FLAP;  Surgeon: Naeem Smalls MD;  [facility];  Service: ENT;  Laterality: N/A;    FLAP PROCEDURE Right 1/4/2022    Procedure: CREATION, FREE FLAP;  Surgeon: Stacey Conde MD;  [facility];  Service: ENT;  Laterality: Right;  Ischemic start 1203  Ischemic stop 1353    GLOSSECTOMY N/A 1/4/2022    Procedure: GLOSSECTOMY;  Surgeon: Naeem Smalls MD;  Location: [facility] OR [address] [place] [address];  Service: ENT;  Laterality: N/A;    RADICAL NECK DISSECTION Left 1/3/2022    Procedure: DISSECTION, NECK, RADICAL;  Surgeon: Naeem Smalls MD;  Location: [facility] OR [address] [place] [address];  Service: ENT;  Laterality: Left;    SKIN CANCER EXCISION      TRACHEOTOMY N/A 1/4/2022    Procedure: TRACHEOTOMY;  Surgeon: Naeem Smalls MD;  Location: [address];  Service: ENT;  Laterality: N/A;       Family History: No family history on file.    Social History:  reports that he quit smoking about 4 years ago. His smoking use included cigarettes. He started smoking about 59 years ago. He has a 80.00 pack-year smoking history. He has never used smokeless tobacco. He reports previous alcohol use of about 3.0 standard drinks of alcohol per week. He reports that he does not use drugs.    Allergies:  Review of patient's allergies indicates:  No Known Allergies    Medications:  Current Outpatient Medications   Medication Sig Dispense Refill    acetaminophen (TYLENOL) 325 MG tablet 2 tablets (650 mg total) by Per G Tube route every 6 (six) hours.  0    albuterol-ipratropium (DUO-NEB) 2.5 mg-0.5 mg/3 mL nebulizer solution Take 3 mLs by nebulization every 4 (four) hours as needed for Wheezing. Rescue 75 mL 0    aspirin 81 MG Chew 1 tablet (81 mg total) by Per G Tube route once daily.  0     atorvastatin (LIPITOR) 20 MG tablet 1 tablet (20 mg total) by Per G Tube route once daily. 90 tablet 3    bisacodyL (DULCOLAX) 10 mg Supp Place 1 suppository (10 mg total) rectally daily as needed.  0    clopidogreL (PLAVIX) 75 mg tablet 1 tablet (75 mg total) by Per G Tube route once daily. 30 tablet 11    folic acid (FOLVITE) 1 MG tablet 1 tablet (1 mg total) by Per G Tube route once daily.  0    glycopyrrolate (CUVPOSA) 1 mg/5 mL (0.2 mg/mL) Soln Take 5 mLs (1 mg total) by mouth 3 (three) times daily as needed (TO REDUCE SECRETIONS). 473 mL 1    guaiFENesin 200 mg/5 mL Liqd Take 400 mg by mouth every 4 (four) hours as needed (for secretions). 473 mL 3    hydrOXYzine HCL (ATARAX) 25 MG tablet SMARTSI.5 Tablet(s) Gastro Tube Every 8 Hours PRN      melatonin (MELATIN) 3 mg tablet 2 tablets (6 mg total) by Per G Tube route nightly.  0    ondansetron (ZOFRAN-ODT) 8 MG TbDL Take 1 tablet (8 mg total) by mouth every 8 (eight) hours as needed (nausea). 30 tablet 1    oxyCODONE (ROXICODONE) 5 mg/5 mL Soln 5 mLs (5 mg total) by Per G Tube route every 4 (four) hours as needed (Pain). 150 mL 0    polyethylene glycol (GLYCOLAX) 17 gram PwPk 17 g by Per G Tube route 2 (two) times daily.  0    prednisone 5 mg/5 mL Soln Take 60 mLs (60 mg total) by mouth once daily for 7 days, THEN 40 mLs (40 mg total) once daily for 7 days, THEN 30 mLs (30 mg total) once daily for 7 days, THEN 20 mLs (20 mg total) once daily for 7 days, THEN 10 mLs (10 mg total) once daily for 7 days. 1120 mL 0    sodium chloride (OCEAN) 0.65 % nasal spray 1 spray by Nasal route as needed for Congestion.  12    thiamine 100 MG tablet 1 tablet (100 mg total) by Per G Tube route once daily.      WIXELA INHUB 250-50 mcg/dose diskus inhaler SMARTSI Puff(s) By Mouth Every 12 Hours       No current facility-administered medications for this visit.       Review of Systems   Constitutional: Positive for fatigue. Negative for activity change,  "appetite change, chills, diaphoresis, fever and unexpected weight change.   HENT: Positive for trouble swallowing and voice change. Negative for ear pain, mouth sores and nosebleeds.    Eyes: Negative for visual disturbance.   Respiratory: Positive for cough and shortness of breath. Negative for chest tightness and wheezing.    Cardiovascular: Negative for chest pain and leg swelling.   Gastrointestinal: Negative for abdominal distention, abdominal pain, blood in stool, constipation, diarrhea, nausea and vomiting.   Endocrine: Negative for cold intolerance and heat intolerance.   Genitourinary: Negative for difficulty urinating and dysuria.   Musculoskeletal: Negative for arthralgias and back pain.   Skin: Negative for color change.   Neurological: Positive for weakness. Negative for dizziness, light-headedness, numbness and headaches.   Hematological: Negative for adenopathy. Does not bruise/bleed easily.   Psychiatric/Behavioral: Negative for confusion.       ECOG Performance Status:     ECOG SCORE    3 - Capable of only limited selfcare, confined to bed or chair more than 50% of waking hours         Objective:      Vitals:   Vitals:    08/24/22 1108   BP: (!) 96/50   BP Location: Right arm   Patient Position: Sitting   BP Method: Medium (Automatic)   Pulse: 65   Resp: (!) 22   SpO2: 97%   Height: 5' 8" (1.727 m)     BMI: Body mass index is 20.23 kg/m².    Physical Exam  Constitutional:       General: He is not in acute distress.     Appearance: He is ill-appearing.   HENT:      Head: Normocephalic and atraumatic.      Mouth/Throat:      Pharynx: No oropharyngeal exudate or posterior oropharyngeal erythema.   Eyes:      General: No scleral icterus.     Extraocular Movements: Extraocular movements intact.      Conjunctiva/sclera: Conjunctivae normal.      Pupils: Pupils are equal, round, and reactive to light.   Neck:      Comments: +trach  Cardiovascular:      Rate and Rhythm: Normal rate and regular rhythm.      " Heart sounds: No murmur heard.    No friction rub. No gallop.   Pulmonary:      Effort: Pulmonary effort is normal. No respiratory distress.      Breath sounds: No stridor. Rales present. No wheezing or rhonchi.   Abdominal:      General: Bowel sounds are normal. There is no distension.      Palpations: Abdomen is soft. There is no mass.      Tenderness: There is no abdominal tenderness. There is no guarding or rebound.      Comments: +peg tube site c/d/i   Musculoskeletal:         General: Normal range of motion.      Cervical back: Normal range of motion and neck supple.      Right lower leg: No edema.      Left lower leg: No edema.   Skin:     General: Skin is warm and dry.      Findings: No erythema.   Neurological:      General: No focal deficit present.      Mental Status: He is alert.      Motor: Weakness present.         Laboratory Data:   Recent Results (from the past 168 hour(s))   POCT glucose    Collection Time: 08/18/22  9:24 AM   Result Value Ref Range    POCT Glucose 106 70 - 110 mg/dL   CBC Auto Differential    Collection Time: 08/24/22 10:00 AM   Result Value Ref Range    WBC 6.18 3.90 - 12.70 K/uL    RBC 3.07 (L) 4.60 - 6.20 M/uL    Hemoglobin 7.7 (L) 14.0 - 18.0 g/dL    Hematocrit 26.8 (L) 40.0 - 54.0 %    MCV 87 82 - 98 fL    MCH 25.1 (L) 27.0 - 31.0 pg    MCHC 28.7 (L) 32.0 - 36.0 g/dL    RDW 16.1 (H) 11.5 - 14.5 %    Platelets 302 150 - 450 K/uL    MPV 9.0 (L) 9.2 - 12.9 fL    Immature Granulocytes 0.2 0.0 - 0.5 %    Gran # (ANC) 4.5 1.8 - 7.7 K/uL    Immature Grans (Abs) 0.01 0.00 - 0.04 K/uL    Lymph # 0.4 (L) 1.0 - 4.8 K/uL    Mono # 0.7 0.3 - 1.0 K/uL    Eos # 0.5 0.0 - 0.5 K/uL    Baso # 0.03 0.00 - 0.20 K/uL    nRBC 0 0 /100 WBC    Gran % 73.1 (H) 38.0 - 73.0 %    Lymph % 7.1 (L) 18.0 - 48.0 %    Mono % 10.8 4.0 - 15.0 %    Eosinophil % 8.3 (H) 0.0 - 8.0 %    Basophil % 0.5 0.0 - 1.9 %    Differential Method Automated    Comprehensive Metabolic Panel    Collection Time: 08/24/22 10:00 AM    Result Value Ref Range    Sodium 136 136 - 145 mmol/L    Potassium 4.6 3.5 - 5.1 mmol/L    Chloride 94 (L) 95 - 110 mmol/L    CO2 34 (H) 23 - 29 mmol/L    Glucose 134 (H) 70 - 110 mg/dL    BUN 23 8 - 23 mg/dL    Creatinine 0.7 0.5 - 1.4 mg/dL    Calcium 9.6 8.7 - 10.5 mg/dL    Total Protein 7.3 6.0 - 8.4 g/dL    Albumin 2.5 (L) 3.5 - 5.2 g/dL    Total Bilirubin 0.2 0.1 - 1.0 mg/dL    Alkaline Phosphatase 81 55 - 135 U/L    AST 19 10 - 40 U/L    ALT 15 10 - 44 U/L    Anion Gap 8 8 - 16 mmol/L    eGFR >60.0 >60 mL/min/1.73 m^2   TSH    Collection Time: 08/24/22 10:00 AM   Result Value Ref Range    TSH 2.279 0.400 - 4.000 uIU/mL       Imaging:         Assessment:       1. Anemia, unspecified type    2. Pulmonary infiltrate    3. Squamous cell cancer of tongue    4. 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 Patient and wife note mild improvement after hospitalization in May after antibiotic treatment, however improvement was transient and now patient is endorsing significant SERRANO and fatigue. Chest imaging significant for worsening pulmonary infiltrates. Patient has tested negative for COVID multiple times, no known sick contacts, fevers, or chills. Wife notes that patient has occasional vomiting and cough however findings are bilateral.     - Will start patient on prednisone taper starting with 60 mg x 1 week. Will decrease to 40 mg x 1 week and then decrease by 10 mg weekly until discontinuation  - Will get EST, CRP  - Will get PFT within the next 2 weeks  - Repeat CT chest in 8 weeks to monitor progress  - Will refer to pulmonology once testing is complete           Relevant Orders    Sedimentation rate    C-REACTIVE PROTEIN    Complete PFT w/ bronchodilator     CT Chest With Contrast       Oncology    Squamous cell cancer of tongue    Overview     12/15/21 FNA left neck mass : squamous cell carcinoma, p16 negative  1/4/22 total glossectomy, bilateral neck dissection, bilateral cervical facial advancement flaps and anterolateral thigh free flap reconstruction of his glossectomy defect.  Final path :  [id number] (+microscopic SALINAS, single contralateral node), superior soft tissue margin of left neck with tumor.    2/28/22-4/20/22 completed adjuvant chemoradiation with weekly cisplatin (240 mg/m2 cumulative dose)           Current Assessment & Plan     Stage IVB squamous cell carcinoma of the tongue with contralateral cervical lymph node involvement, +margins, +SALINAS (microscopic).  S/p peg tube placement. S/p adjuvant chemoradiation.  - Imaging PET scan 8/18 with no evidence of active HNSCC  - Imaging shows multiple enlarged hypermetabolic mediastinal nodes, however in the setting of possible inflammatory process this is of unclear significance. Will address pulmonary infiltrates and reassess this finding on repeat scans  - Labs reviewed, follow up 3 months with labs  - Continue supportive medications: zofran ODT prn nausea. Glycopyrrolate prn secretions. Magic mouthwash for mucositis from chemoRT           Anemia - Primary    Current Assessment & Plan     Patient with long standing anemia with baseline anywhere between Hg anywhere between 8-10. B12 and folate normal in February of 2022. Patient with complaints of increased fatigue and SERRANO. No noted blood loss by patient's wife (melena or BRBPR).  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 Morning appointment preferred   Follow up with HEIKE    Infusion scheduling note    Injection scheduling note    Labs CMP, CBC, TSH, ferritin and iron and TIBC   Lab interval:  CBC, CMP, and TSH on three month follow up. CRP, ESR, iron, TIBC, and ferritin in [facility] within the next week   Imaging    PFT within the next two weeks. CT chest in 8 weeks   Pharmacy appointment No pharmacy appointment needed      Other referrals No additional referrals needed                Fer Kaba MD  PGY IV  Hematology Oncology

## 2022-08-24 NOTE — ASSESSMENT & PLAN NOTE
Stage IVB squamous cell carcinoma of the tongue with contralateral cervical lymph node involvement, +margins, +SALINAS (microscopic).  S/p peg tube placement. S/p adjuvant chemoradiation.  - Imaging PET scan 8/18 with no evidence of active HNSCC  - Imaging shows multiple enlarged hypermetabolic mediastinal nodes, however in the setting of possible inflammatory process this is of unclear significance. Will address pulmonary infiltrates and reassess this finding on repeat scans  - Labs reviewed, follow up 3 months with labs  - Continue supportive medications: zofran ODT prn nausea. Glycopyrrolate prn secretions. Magic mouthwash for mucositis from chemoRT

## 2022-08-25 DIAGNOSIS — E03.2 HYPOTHYROIDISM DUE TO MEDICAMENTS AND OTHER EXOGENOUS SUBSTANCES: Primary | ICD-10-CM

## 2022-08-26 ENCOUNTER — OFFICE VISIT (OUTPATIENT)
Dept: CARDIOLOGY | Facility: CLINIC | Age: 72
End: 2022-08-26
Payer: MEDICARE

## 2022-08-26 VITALS
HEIGHT: 68 IN | SYSTOLIC BLOOD PRESSURE: 101 MMHG | RESPIRATION RATE: 20 BRPM | BODY MASS INDEX: 20.16 KG/M2 | DIASTOLIC BLOOD PRESSURE: 55 MMHG | WEIGHT: 133 LBS | HEART RATE: 74 BPM

## 2022-08-26 DIAGNOSIS — E78.49 OTHER HYPERLIPIDEMIA: ICD-10-CM

## 2022-08-26 DIAGNOSIS — C02.9 SQUAMOUS CELL CANCER OF TONGUE: ICD-10-CM

## 2022-08-26 DIAGNOSIS — Z99.81 CHRONIC RESPIRATORY FAILURE WITH HYPOXIA, ON HOME O2 THERAPY: Primary | ICD-10-CM

## 2022-08-26 DIAGNOSIS — J96.11 CHRONIC RESPIRATORY FAILURE WITH HYPOXIA, ON HOME O2 THERAPY: Primary | ICD-10-CM

## 2022-08-26 DIAGNOSIS — I25.10 CORONARY ARTERY DISEASE INVOLVING NATIVE CORONARY ARTERY OF NATIVE HEART WITHOUT ANGINA PECTORIS: ICD-10-CM

## 2022-08-26 DIAGNOSIS — Z01.818 PRE-OP TESTING: ICD-10-CM

## 2022-08-26 DIAGNOSIS — J44.9 CHRONIC OBSTRUCTIVE PULMONARY DISEASE, UNSPECIFIED COPD TYPE: ICD-10-CM

## 2022-08-26 DIAGNOSIS — J44.1 COPD EXACERBATION: ICD-10-CM

## 2022-08-26 DIAGNOSIS — I73.9 PERIPHERAL VASCULAR DISEASE: ICD-10-CM

## 2022-08-26 DIAGNOSIS — I10 PRIMARY HYPERTENSION: ICD-10-CM

## 2022-08-26 DIAGNOSIS — I51.9 HEART DISEASE: ICD-10-CM

## 2022-08-26 PROBLEM — Z01.810 PRE-OPERATIVE CARDIOVASCULAR EXAMINATION: Status: RESOLVED | Noted: 2021-12-29 | Resolved: 2022-08-26

## 2022-08-26 PROCEDURE — 1101F PT FALLS ASSESS-DOCD LE1/YR: CPT | Mod: CPTII,S$GLB,, | Performed by: INTERNAL MEDICINE

## 2022-08-26 PROCEDURE — 4010F PR ACE/ARB THEARPY RXD/TAKEN: ICD-10-PCS | Mod: CPTII,S$GLB,, | Performed by: INTERNAL MEDICINE

## 2022-08-26 PROCEDURE — 1159F MED LIST DOCD IN RCRD: CPT | Mod: CPTII,S$GLB,, | Performed by: INTERNAL MEDICINE

## 2022-08-26 PROCEDURE — 3008F PR BODY MASS INDEX (BMI) DOCUMENTED: ICD-10-PCS | Mod: CPTII,S$GLB,, | Performed by: INTERNAL MEDICINE

## 2022-08-26 PROCEDURE — 1126F PR PAIN SEVERITY QUANTIFIED, NO PAIN PRESENT: ICD-10-PCS | Mod: CPTII,S$GLB,, | Performed by: INTERNAL MEDICINE

## 2022-08-26 PROCEDURE — 3078F DIAST BP <80 MM HG: CPT | Mod: CPTII,S$GLB,, | Performed by: INTERNAL MEDICINE

## 2022-08-26 PROCEDURE — 99999 PR PBB SHADOW E&M-EST. PATIENT-LVL V: ICD-10-PCS | Mod: PBBFAC,,, | Performed by: INTERNAL MEDICINE

## 2022-08-26 PROCEDURE — 93000 ELECTROCARDIOGRAM COMPLETE: CPT | Mod: S$GLB,,, | Performed by: INTERNAL MEDICINE

## 2022-08-26 PROCEDURE — 99215 OFFICE O/P EST HI 40 MIN: CPT | Mod: S$GLB,,, | Performed by: INTERNAL MEDICINE

## 2022-08-26 PROCEDURE — 1126F AMNT PAIN NOTED NONE PRSNT: CPT | Mod: CPTII,S$GLB,, | Performed by: INTERNAL MEDICINE

## 2022-08-26 PROCEDURE — 3288F PR FALLS RISK ASSESSMENT DOCUMENTED: ICD-10-PCS | Mod: CPTII,S$GLB,, | Performed by: INTERNAL MEDICINE

## 2022-08-26 PROCEDURE — 1159F PR MEDICATION LIST DOCUMENTED IN MEDICAL RECORD: ICD-10-PCS | Mod: CPTII,S$GLB,, | Performed by: INTERNAL MEDICINE

## 2022-08-26 PROCEDURE — 3074F SYST BP LT 130 MM HG: CPT | Mod: CPTII,S$GLB,, | Performed by: INTERNAL MEDICINE

## 2022-08-26 PROCEDURE — 99215 PR OFFICE/OUTPT VISIT, EST, LEVL V, 40-54 MIN: ICD-10-PCS | Mod: S$GLB,,, | Performed by: INTERNAL MEDICINE

## 2022-08-26 PROCEDURE — 3288F FALL RISK ASSESSMENT DOCD: CPT | Mod: CPTII,S$GLB,, | Performed by: INTERNAL MEDICINE

## 2022-08-26 PROCEDURE — 3078F PR MOST RECENT DIASTOLIC BLOOD PRESSURE < 80 MM HG: ICD-10-PCS | Mod: CPTII,S$GLB,, | Performed by: INTERNAL MEDICINE

## 2022-08-26 PROCEDURE — 1101F PR PT FALLS ASSESS DOC 0-1 FALLS W/OUT INJ PAST YR: ICD-10-PCS | Mod: CPTII,S$GLB,, | Performed by: INTERNAL MEDICINE

## 2022-08-26 PROCEDURE — 93000 EKG 12-LEAD: ICD-10-PCS | Mod: S$GLB,,, | Performed by: INTERNAL MEDICINE

## 2022-08-26 PROCEDURE — 3008F BODY MASS INDEX DOCD: CPT | Mod: CPTII,S$GLB,, | Performed by: INTERNAL MEDICINE

## 2022-08-26 PROCEDURE — 3074F PR MOST RECENT SYSTOLIC BLOOD PRESSURE < 130 MM HG: ICD-10-PCS | Mod: CPTII,S$GLB,, | Performed by: INTERNAL MEDICINE

## 2022-08-26 PROCEDURE — 4010F ACE/ARB THERAPY RXD/TAKEN: CPT | Mod: CPTII,S$GLB,, | Performed by: INTERNAL MEDICINE

## 2022-08-26 PROCEDURE — 99999 PR PBB SHADOW E&M-EST. PATIENT-LVL V: CPT | Mod: PBBFAC,,, | Performed by: INTERNAL MEDICINE

## 2022-08-26 NOTE — ASSESSMENT & PLAN NOTE
S/p R CEA and bilateral femoral atherectomy with likely bilateral PIs. Regardless patient is asymptomatic from his carotid disease and has [place] 2 symptoms with biphasic Dps by Doppler and no e/o CLI. Continue with clopidogrel and risk factor modification.

## 2022-08-26 NOTE — ASSESSMENT & PLAN NOTE
The patient has chronic ischemic heart disease s/p RCA revasc in '00. No anginal symptoms since. Unremarkable TTE in May and normal NST in '18. Chronic SERRANO 2/2 severe COPD on home O2. Cardiac exam benign today with a normal ecg. No e/o HF or coronary ischemia. Would focus on chronic lung disease.    Anemia noted. Will stop asa and continue with clopidogrel monotherapy. MCV normal. Continue medical management and RF modification.

## 2022-08-26 NOTE — PROGRESS NOTES
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    Patient was initially evaluated by me 1 year ago for preoperative evaluation prior to anticipated had neck surgery for squamous cell carcinoma of the tongue. He has done well from a cancer perspective and recent PET scan results showing no e/o active malignancy.    Post-op he has been struggling with SERRANO worse than baseline. He is on home O2. No chest pain. He does have chronic dyspnea and is on home O2 for his COPD for years.      He has a h/o RCA PCI, no history of congestive heart failure or cardiomyopathy. Nml stress 2018. The patient additionally has a significant PAD history s/p right CEA in 2018. He has no history of stroke or neurological deficits. He had bilateral femoral endarterectomy with staged lower extremity interventions as well. He has mild, chronic claudication after walking a few blocks that has been stable since 2018. This is much improved compared to pre-intervention and not limiting. No rest pain or ulcers.     The patient has been on the same CV regimen for some time including asa 81x1, clopidogrel 75x1, metoprolol succinate 200x2, losartan 100x1, amlodipine 10x1, and atorvastatin 20x1. He reports controlled Bps on this regimen. No bleeding issues.     He was recently started on prednisone and a CT chest was ordered.     PHYSICAL EXAM:  Vitals:    08/26/22 1323   BP: (!) 101/55   Pulse: 74   Resp: 20       Physical Exam  Constitutional:       Appearance: Normal appearance.   HENT:      Head:      Comments: Tracheostomy  Neck:      Vascular: No carotid bruit or JVD.    Cardiovascular:      Rate and Rhythm: Normal rate and regular rhythm.      Pulses:           Carotid pulses are 2+ on the right side and 2+ on the left side.       Radial pulses are 2+ on the right side and 2+ on the left side.        Dorsalis pedis pulses are detected w/ Doppler on the right side and detected w/ Doppler on the left side.        Posterior tibial pulses are detected w/ Doppler on the right side.      Heart sounds: S1 normal and S2 normal. No murmur heard.    No S3 sounds.   Pulmonary:      Effort: Pulmonary effort is normal.      Breath sounds: Decreased breath sounds and wheezing present. No rales.   Feet:      Right foot:      Skin integrity: Skin integrity normal.      Left foot:      Skin integrity: Skin integrity normal.      Comments: Chronic skin changes. No ulcers.  Skin:     General: Skin is warm and dry.      Findings: No lesion.   Neurological:      Mental Status: He is alert and oriented to person, place, and time.      Motor: Motor function is intact.      Gait: Gait is intact.         LABS/CARDIAC TESTS:  August 2022 Hb 7.7 w an MCV 87. Plt 302. Cr 0.7/BUN 23. Alb 2.5. TSH normal. May BNP 98.   Labs from outside hospital system in November of 2021 demonstrate a hemoglobin of 13.1 with MCV of 94 and platelets of 287. CMP demonstrates normal electrolytes with a creatinine of 1.26 a BUN of 16 an albumin of 4.3.  LDL is 50 and HDL is 50.  TSH and A1c are normal.    EKG today demonstrates sinus rhythm with no Q-waves or ST changes.  TTE April 2022 Nml LV size and fxn. EF 60%. Mildly dilated aorta.   CT Chest December 2021 demonstrates extensive aortic atherosclerosis with dilation of the descending thoracic aorta to 3.9cm. Moderate emphysematous lung changes.     Referenced in Dr. Segal's cardiology note from December 4th, 2018 uploaded to media:  Cardiac catheterization in January 3, 2000 demonstrates a large dominant RCA system with significant mid vessel disease status post PCI with a  3.0 x 18 mm stent.  Large patent LAD system, patent ramus, nonobstructive circ disease.  Nuclear stress test February 14, 2018 with an EF of 69% and normal perfusion.  PFT's January 24, 2018 severe lung disease with an FEV1 of 1.14 and a markedly decreased DLCO.      ASSESSMENT & PLAN:    Coronary artery disease involving native coronary artery of native heart without angina pectoris  The patient has chronic ischemic heart disease s/p RCA revasc in '00. No anginal symptoms since. Unremarkable TTE in May and normal NST in '18. Chronic SERRANO 2/2 severe COPD on home O2. Cardiac exam benign today with a normal ecg. No e/o HF or coronary ischemia. Would focus on chronic lung disease.    Anemia noted. Will stop asa and continue with clopidogrel monotherapy. MCV normal. Continue medical management and RF modification.       Peripheral vascular disease  S/p R CEA and bilateral femoral atherectomy with likely bilateral PIs. Regardless patient is asymptomatic from his carotid disease and has [place] 2 symptoms with biphasic Dps by Doppler and no e/o CLI. Continue with clopidogrel and risk factor modification.        Primary hypertension  Blood pressures controlled on above regimen.     Other hyperlipidemia  LDL at goal on atorvastatin 20x1.       Chronic respiratory failure with hypoxia, on home O2 therapy    Squamous cell cancer of tongue  -     Ambulatory referral/consult to Cardiology    Chronic obstructive pulmonary disease, unspecified COPD type  -     Ambulatory referral/consult to Cardiology  -     Ambulatory referral/consult to Pulmonology; Future; Expected date: 09/02/2022    Heart disease  -     Ambulatory referral/consult to Cardiology    COPD exacerbation    Coronary artery disease involving native coronary artery of native heart without angina pectoris  -     EKG 12-lead    Peripheral vascular disease    Primary hypertension    Other hyperlipidemia        Edison Whiteside MD

## 2022-09-01 ENCOUNTER — LAB VISIT (OUTPATIENT)
Dept: LAB | Facility: HOSPITAL | Age: 72
End: 2022-09-01
Attending: STUDENT IN AN ORGANIZED HEALTH CARE EDUCATION/TRAINING PROGRAM
Payer: MEDICARE

## 2022-09-01 DIAGNOSIS — R91.8 PULMONARY INFILTRATE: ICD-10-CM

## 2022-09-01 DIAGNOSIS — D64.9 ANEMIA, UNSPECIFIED TYPE: ICD-10-CM

## 2022-09-01 LAB
CRP SERPL-MCNC: 33.1 MG/L (ref 0–8.2)
ERYTHROCYTE [SEDIMENTATION RATE] IN BLOOD BY WESTERGREN METHOD: 75 MM/HR (ref 0–10)
FERRITIN SERPL-MCNC: 143 NG/ML (ref 20–300)
IRON SERPL-MCNC: 22 UG/DL (ref 45–160)
SATURATED IRON: 6 % (ref 20–50)
TOTAL IRON BINDING CAPACITY: 400 UG/DL (ref 250–450)
TRANSFERRIN SERPL-MCNC: 270 MG/DL (ref 200–375)

## 2022-09-01 PROCEDURE — 84466 ASSAY OF TRANSFERRIN: CPT | Performed by: STUDENT IN AN ORGANIZED HEALTH CARE EDUCATION/TRAINING PROGRAM

## 2022-09-01 PROCEDURE — 85652 RBC SED RATE AUTOMATED: CPT | Performed by: STUDENT IN AN ORGANIZED HEALTH CARE EDUCATION/TRAINING PROGRAM

## 2022-09-01 PROCEDURE — 82728 ASSAY OF FERRITIN: CPT | Performed by: STUDENT IN AN ORGANIZED HEALTH CARE EDUCATION/TRAINING PROGRAM

## 2022-09-01 PROCEDURE — 86140 C-REACTIVE PROTEIN: CPT | Performed by: STUDENT IN AN ORGANIZED HEALTH CARE EDUCATION/TRAINING PROGRAM

## 2022-09-01 PROCEDURE — 36415 COLL VENOUS BLD VENIPUNCTURE: CPT | Performed by: STUDENT IN AN ORGANIZED HEALTH CARE EDUCATION/TRAINING PROGRAM

## 2022-09-08 ENCOUNTER — OFFICE VISIT (OUTPATIENT)
Dept: OTOLARYNGOLOGY | Facility: CLINIC | Age: 72
End: 2022-09-08
Payer: MEDICARE

## 2022-09-08 DIAGNOSIS — C02.9 SQUAMOUS CELL CANCER OF TONGUE: Primary | ICD-10-CM

## 2022-09-08 PROCEDURE — 99213 PR OFFICE/OUTPT VISIT, EST, LEVL III, 20-29 MIN: ICD-10-PCS | Mod: S$GLB,,, | Performed by: OTOLARYNGOLOGY

## 2022-09-08 PROCEDURE — 1160F RVW MEDS BY RX/DR IN RCRD: CPT | Mod: CPTII,S$GLB,, | Performed by: OTOLARYNGOLOGY

## 2022-09-08 PROCEDURE — 99213 OFFICE O/P EST LOW 20 MIN: CPT | Mod: S$GLB,,, | Performed by: OTOLARYNGOLOGY

## 2022-09-08 PROCEDURE — 99999 PR PBB SHADOW E&M-EST. PATIENT-LVL III: CPT | Mod: PBBFAC,,, | Performed by: OTOLARYNGOLOGY

## 2022-09-08 PROCEDURE — 1126F AMNT PAIN NOTED NONE PRSNT: CPT | Mod: CPTII,S$GLB,, | Performed by: OTOLARYNGOLOGY

## 2022-09-08 PROCEDURE — 4010F PR ACE/ARB THEARPY RXD/TAKEN: ICD-10-PCS | Mod: CPTII,S$GLB,, | Performed by: OTOLARYNGOLOGY

## 2022-09-08 PROCEDURE — 1160F PR REVIEW ALL MEDS BY PRESCRIBER/CLIN PHARMACIST DOCUMENTED: ICD-10-PCS | Mod: CPTII,S$GLB,, | Performed by: OTOLARYNGOLOGY

## 2022-09-08 PROCEDURE — 4010F ACE/ARB THERAPY RXD/TAKEN: CPT | Mod: CPTII,S$GLB,, | Performed by: OTOLARYNGOLOGY

## 2022-09-08 PROCEDURE — 99999 PR PBB SHADOW E&M-EST. PATIENT-LVL III: ICD-10-PCS | Mod: PBBFAC,,, | Performed by: OTOLARYNGOLOGY

## 2022-09-08 PROCEDURE — 1159F MED LIST DOCD IN RCRD: CPT | Mod: CPTII,S$GLB,, | Performed by: OTOLARYNGOLOGY

## 2022-09-08 PROCEDURE — 1126F PR PAIN SEVERITY QUANTIFIED, NO PAIN PRESENT: ICD-10-PCS | Mod: CPTII,S$GLB,, | Performed by: OTOLARYNGOLOGY

## 2022-09-08 PROCEDURE — 1159F PR MEDICATION LIST DOCUMENTED IN MEDICAL RECORD: ICD-10-PCS | Mod: CPTII,S$GLB,, | Performed by: OTOLARYNGOLOGY

## 2022-09-10 NOTE — PROGRESS NOTES
Chief Complaint   Patient presents with    Follow-up     Oncology History   Squamous cell cancer of tongue   12/16/2021 Initial Diagnosis    Squamous cell cancer of tongue     12/16/2021 Tumor Conference       His case was discussed at the Multidisciplinary Head and Neck Team Planning Meeting.    Representatives from Medical Oncology, Radiation Oncology, Head and Neck Surgical Oncology, Psychosocial Oncology, and Speech and Language Pathology discussed the case with the following recommendations:    1) surgery  2) work up lung nodule post op         1/2/2022 Cancer Staged    Staging form: Oral Cavity, AJCC 8th Edition  - Clinical stage from 1/2/2022: Stage NAFISA (cT2, cN2a, cM0)       1/4/2022 Cancer Staged    Staging form: Oral Cavity, AJCC 8th Edition  - Pathologic stage from 1/4/2022: Stage IVB (pT4a, pN3b, cM0)       2/28/2022 - 4/6/2022 Chemotherapy    Treatment Summary   Plan Name: OP HEAD NECK CISPLATIN WEEKLY + RADIOTHERAPY  Treatment Goal: Curative  Status: Inactive  Start Date: 2/28/2022  End Date: 4/6/2022  Provider: Christopher Jay MD  Chemotherapy: CISplatin (PLATINOL) 40 mg/m2 = 69 mg in sodium chloride 0.9% 500 mL chemo infusion, 40 mg/m2 = 69 mg, Intravenous, Clinic/HOD 1 time, 6 of 7 cycles  Administration: 69 mg (2/28/2022), 66 mg (3/7/2022), 69 mg (3/16/2022), 69 mg (3/23/2022), 69 mg (3/30/2022), 70 mg (4/6/2022)        Radiation Therapy    Treatment Summary  Course: C1 HN 2022  Treatment Site Ref. ID Energy Dose/Fx (Gy) #Fx Dose Correction (Gy) Total Dose (Gy) Start Date End Date Elapsed Days   IM H&N:1 PTV_66 6X 2 7 / 7 0 14 2/28/2022 3/10/2022 10   IM H&N2 PTV_66 6X 2 26 / 26 0 51.903 3/11/2022 4/22/2022 42      Secondary malignant neoplasm of cervical lymph node   2/16/2022 Initial Diagnosis    Secondary malignant neoplasm of cervical lymph node     2/28/2022 - 4/6/2022 Chemotherapy    Treatment Summary   Plan Name: OP HEAD NECK CISPLATIN WEEKLY + RADIOTHERAPY  Treatment Goal: Curative  Status:  Inactive  Start Date: 2/28/2022  End Date: 4/6/2022  Provider: Christopher Jay MD  Chemotherapy: CISplatin (PLATINOL) 40 mg/m2 = 69 mg in sodium chloride 0.9% 500 mL chemo infusion, 40 mg/m2 = 69 mg, Intravenous, Clinic/HOD 1 time, 6 of 7 cycles  Administration: 69 mg (2/28/2022), 66 mg (3/7/2022), 69 mg (3/16/2022), 69 mg (3/23/2022), 69 mg (3/30/2022), 70 mg (4/6/2022)             HPI   72 y.o. male presents with the above treatment history.  No new complaints.     Review of Systems   Constitutional: Negative for fatigue and unexpected weight change.   HENT: Per HPI.  Eyes: Negative for visual disturbance.   Respiratory: Negative for shortness of breath, hemoptysis   Cardiovascular: Negative for chest pain and palpitations.   Musculoskeletal: Negative for decreased ROM, back pain.   Skin: Negative for rash, sunburn, itching.   Neurological: Negative for dizziness and seizures.   Hematological: Negative for adenopathy. Does not bruise/bleed easily.   Endocrine: Negative for rapid weight loss/weight gain, heat/cold intolerance.     Past Medical History   Patient Active Problem List   Diagnosis    Peripheral vascular disease    Carotid stenosis    Squamous cell cancer of tongue    Coronary artery disease involving native coronary artery of native heart without angina pectoris    Primary hypertension    Other hyperlipidemia    Impaired mobility and ADLs    Tracheobronchitis    Toxic effect of tobacco cigarette, intentional self-harm    COPD exacerbation    Anemia    Secondary malignant neoplasm of cervical lymph node    Protein-calorie malnutrition    Chronic respiratory failure with hypoxia, on home O2 therapy    Acute on chronic respiratory failure    Dysphagia    Sinus tachycardia    Aspiration pneumonia    Goals of care, counseling/discussion    Pulmonary infiltrate    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            Past Surgical History   Past Surgical History:   Procedure Laterality Date     ABDOMINAL SURGERY      stents placed in liver and large intestines, per patient    CAROTID STENT      CORONARY STENT PLACEMENT  2000    DISSECTION OF NECK Bilateral 2022    Procedure: DISSECTION, NECK;  Surgeon: Naeem Smalls MD;  [facility];  Service: ENT;  Laterality: Bilateral;    ESOPHAGOGASTRODUODENOSCOPY W/ PEG N/A 2022    Procedure: EGD, WITH PEG TUBE INSERTION;  Surgeon: Anthony Calabrese MD;  [facility];  Service: General;  Laterality: N/A;    EYE SURGERY      Cataract bilateral    femoral stents      bilateral    FLAP PROCEDURE N/A 1/3/2022    Procedure: CREATION, FREE FLAP;  Surgeon: Naeem Smalls MD;  [facility];  Service: ENT;  Laterality: N/A;    FLAP PROCEDURE Right 2022    Procedure: CREATION, FREE FLAP;  Surgeon: Stacey Conde MD;  [facility];  Service: ENT;  Laterality: Right;  Ischemic start 1203  Ischemic stop 1353    GLOSSECTOMY N/A 2022    Procedure: GLOSSECTOMY;  Surgeon: Naeem Smalls MD;  [facility];  Service: ENT;  Laterality: N/A;    RADICAL NECK DISSECTION Left 1/3/2022    Procedure: DISSECTION, NECK, RADICAL;  Surgeon: Naeem Smalls MD;  [facility];  Service: ENT;  Laterality: Left;    SKIN CANCER EXCISION      TRACHEOTOMY N/A 2022    Procedure: TRACHEOTOMY;  Surgeon: Naeem Smalls MD;  [facility];  Service: ENT;  Laterality: N/A;         Family History   History reviewed. No pertinent family history.        Social History   .  Social History     Socioeconomic History    Marital status:    Tobacco Use    Smoking status: Former     Packs/day: 2.00     Years: 40.00     Pack years: 80.00     Types: Cigarettes     Start date: 1963     Quit date: 2018     Years since quittin.4    Smokeless tobacco: Never    Tobacco comments:     3/3 ppd x 40 yrs. Currently 3-4 cigarettes daily .He is trying  to quit. Is  using a Vapor cigarettes  2-3 x's a day.   Substance and Sexual Activity    Alcohol use: Not Currently     Alcohol/week: 3.0 standard drinks     Types: 3 Cans of beer per week     Comment: beer daily 3-4    Drug use: No    Sexual activity: Not Currently         Allergies   Review of patient's allergies indicates:  No Known Allergies        Physical Exam     There were no vitals filed for this visit.        There is no height or weight on file to calculate BMI.      General: AOx3, NAD   Respiratory:  Symmetric chest rise, normal effort  Oral cavity/oropharynx:  No worrisome lesions.  Flap well incorporated.  Neck:  Well-healed neck scars.  Size 6 uncuffed Shiley tracheostomy tube in place- exchanged for identical tube.  No LAD.  Face: House Brackmann I bilaterally.     NP: No lesions of posterior wall  OP: No lesions of posterior wall or BOT. BOT is soft to palpation  Larynx: No lesions of glottic or supraglottic larynx. Normal vocal fold mobility   HP: No lesions of pyriform sinuses or postcricoid region  Mirror examination was performed.      Assessment/Plan  Problem List Items Addressed This Visit          Oncology    Squamous cell cancer of tongue - Primary     HARVEY.  Trach changed today.  RTC 6 weeks.

## 2022-09-14 ENCOUNTER — HOSPITAL ENCOUNTER (EMERGENCY)
Facility: HOSPITAL | Age: 72
Discharge: LEFT BEFORE DISCHARGE | End: 2022-09-14
Attending: STUDENT IN AN ORGANIZED HEALTH CARE EDUCATION/TRAINING PROGRAM | Admitting: OTOLARYNGOLOGY
Payer: MEDICARE

## 2022-09-14 VITALS
OXYGEN SATURATION: 95 % | BODY MASS INDEX: 20.46 KG/M2 | HEART RATE: 89 BPM | RESPIRATION RATE: 28 BRPM | WEIGHT: 135 LBS | SYSTOLIC BLOOD PRESSURE: 99 MMHG | TEMPERATURE: 98 F | HEIGHT: 68 IN | DIASTOLIC BLOOD PRESSURE: 68 MMHG

## 2022-09-14 DIAGNOSIS — J96.11 CHRONIC RESPIRATORY FAILURE WITH HYPOXIA, ON HOME O2 THERAPY: ICD-10-CM

## 2022-09-14 DIAGNOSIS — R04.2 COUGH WITH HEMOPTYSIS: Primary | ICD-10-CM

## 2022-09-14 DIAGNOSIS — R07.9 CHEST PAIN: ICD-10-CM

## 2022-09-14 DIAGNOSIS — T17.908A ASPIRATION INTO AIRWAY, INITIAL ENCOUNTER: ICD-10-CM

## 2022-09-14 DIAGNOSIS — Z99.81 CHRONIC RESPIRATORY FAILURE WITH HYPOXIA, ON HOME O2 THERAPY: ICD-10-CM

## 2022-09-14 DIAGNOSIS — C02.9 SQUAMOUS CELL CANCER OF TONGUE: ICD-10-CM

## 2022-09-14 DIAGNOSIS — Z93.0 TRACHEOSTOMY DEPENDENCE: ICD-10-CM

## 2022-09-14 LAB
ALBUMIN SERPL BCP-MCNC: 2.7 G/DL (ref 3.5–5.2)
ALLENS TEST: ABNORMAL
ALP SERPL-CCNC: 71 U/L (ref 55–135)
ALT SERPL W/O P-5'-P-CCNC: 12 U/L (ref 10–44)
ANION GAP SERPL CALC-SCNC: 6 MMOL/L (ref 8–16)
APTT BLDCRRT: 23.4 SEC (ref 21–32)
AST SERPL-CCNC: 11 U/L (ref 10–40)
BASOPHILS # BLD AUTO: 0.01 K/UL (ref 0–0.2)
BASOPHILS NFR BLD: 0.1 % (ref 0–1.9)
BILIRUB SERPL-MCNC: 0.2 MG/DL (ref 0.1–1)
BNP SERPL-MCNC: 185 PG/ML (ref 0–99)
BUN SERPL-MCNC: 34 MG/DL (ref 8–23)
CALCIUM SERPL-MCNC: 9.5 MG/DL (ref 8.7–10.5)
CHLORIDE SERPL-SCNC: 96 MMOL/L (ref 95–110)
CO2 SERPL-SCNC: 35 MMOL/L (ref 23–29)
CREAT SERPL-MCNC: 0.7 MG/DL (ref 0.5–1.4)
CTP QC/QA: YES
CTP QC/QA: YES
DIFFERENTIAL METHOD: ABNORMAL
EOSINOPHIL # BLD AUTO: 0.2 K/UL (ref 0–0.5)
EOSINOPHIL NFR BLD: 1.4 % (ref 0–8)
ERYTHROCYTE [DISTWIDTH] IN BLOOD BY AUTOMATED COUNT: 16.4 % (ref 11.5–14.5)
EST. GFR  (NO RACE VARIABLE): >60 ML/MIN/1.73 M^2
GLUCOSE SERPL-MCNC: 122 MG/DL (ref 70–110)
HCO3 UR-SCNC: 35.7 MMOL/L (ref 24–28)
HCT VFR BLD AUTO: 27.9 % (ref 40–54)
HGB BLD-MCNC: 8.4 G/DL (ref 14–18)
IMM GRANULOCYTES # BLD AUTO: 0.05 K/UL (ref 0–0.04)
IMM GRANULOCYTES NFR BLD AUTO: 0.5 % (ref 0–0.5)
INR PPP: 1 (ref 0.8–1.2)
LYMPHOCYTES # BLD AUTO: 0.6 K/UL (ref 1–4.8)
LYMPHOCYTES NFR BLD: 5.8 % (ref 18–48)
MCH RBC QN AUTO: 24.5 PG (ref 27–31)
MCHC RBC AUTO-ENTMCNC: 30.1 G/DL (ref 32–36)
MCV RBC AUTO: 81 FL (ref 82–98)
MONOCYTES # BLD AUTO: 0.8 K/UL (ref 0.3–1)
MONOCYTES NFR BLD: 7.6 % (ref 4–15)
NEUTROPHILS # BLD AUTO: 9.2 K/UL (ref 1.8–7.7)
NEUTROPHILS NFR BLD: 84.6 % (ref 38–73)
NRBC BLD-RTO: 0 /100 WBC
PCO2 BLDA: 62 MMHG (ref 35–45)
PH SMN: 7.37 [PH] (ref 7.35–7.45)
PLATELET # BLD AUTO: 228 K/UL (ref 150–450)
PMV BLD AUTO: 9 FL (ref 9.2–12.9)
PO2 BLDA: 15 MMHG (ref 40–60)
POC BE: 10 MMOL/L
POC SATURATED O2: 16 % (ref 95–100)
POC TCO2: 38 MMOL/L (ref 24–29)
POTASSIUM SERPL-SCNC: 4.4 MMOL/L (ref 3.5–5.1)
PROT SERPL-MCNC: 6.7 G/DL (ref 6–8.4)
PROTHROMBIN TIME: 10.3 SEC (ref 9–12.5)
RBC # BLD AUTO: 3.43 M/UL (ref 4.6–6.2)
SAMPLE: ABNORMAL
SARS-COV-2 RDRP RESP QL NAA+PROBE: NEGATIVE
SARS-COV-2 RDRP RESP QL NAA+PROBE: NEGATIVE
SITE: ABNORMAL
SODIUM SERPL-SCNC: 137 MMOL/L (ref 136–145)
TROPONIN I SERPL DL<=0.01 NG/ML-MCNC: 0.01 NG/ML (ref 0–0.03)
WBC # BLD AUTO: 10.89 K/UL (ref 3.9–12.7)

## 2022-09-14 PROCEDURE — 85610 PROTHROMBIN TIME: CPT | Performed by: STUDENT IN AN ORGANIZED HEALTH CARE EDUCATION/TRAINING PROGRAM

## 2022-09-14 PROCEDURE — 99285 EMERGENCY DEPT VISIT HI MDM: CPT | Mod: 25

## 2022-09-14 PROCEDURE — 93010 EKG 12-LEAD: ICD-10-PCS | Mod: ,,, | Performed by: INTERNAL MEDICINE

## 2022-09-14 PROCEDURE — 93005 ELECTROCARDIOGRAM TRACING: CPT

## 2022-09-14 PROCEDURE — 94761 N-INVAS EAR/PLS OXIMETRY MLT: CPT

## 2022-09-14 PROCEDURE — 96366 THER/PROPH/DIAG IV INF ADDON: CPT

## 2022-09-14 PROCEDURE — 94640 AIRWAY INHALATION TREATMENT: CPT

## 2022-09-14 PROCEDURE — 25500020 PHARM REV CODE 255: Performed by: STUDENT IN AN ORGANIZED HEALTH CARE EDUCATION/TRAINING PROGRAM

## 2022-09-14 PROCEDURE — 83880 ASSAY OF NATRIURETIC PEPTIDE: CPT | Performed by: STUDENT IN AN ORGANIZED HEALTH CARE EDUCATION/TRAINING PROGRAM

## 2022-09-14 PROCEDURE — 63600175 PHARM REV CODE 636 W HCPCS: Performed by: EMERGENCY MEDICINE

## 2022-09-14 PROCEDURE — U0002 COVID-19 LAB TEST NON-CDC: HCPCS | Performed by: STUDENT IN AN ORGANIZED HEALTH CARE EDUCATION/TRAINING PROGRAM

## 2022-09-14 PROCEDURE — 93010 ELECTROCARDIOGRAM REPORT: CPT | Mod: ,,, | Performed by: INTERNAL MEDICINE

## 2022-09-14 PROCEDURE — 85730 THROMBOPLASTIN TIME PARTIAL: CPT | Performed by: STUDENT IN AN ORGANIZED HEALTH CARE EDUCATION/TRAINING PROGRAM

## 2022-09-14 PROCEDURE — 84484 ASSAY OF TROPONIN QUANT: CPT | Performed by: STUDENT IN AN ORGANIZED HEALTH CARE EDUCATION/TRAINING PROGRAM

## 2022-09-14 PROCEDURE — 85025 COMPLETE CBC W/AUTO DIFF WBC: CPT | Performed by: STUDENT IN AN ORGANIZED HEALTH CARE EDUCATION/TRAINING PROGRAM

## 2022-09-14 PROCEDURE — 96365 THER/PROPH/DIAG IV INF INIT: CPT | Mod: 59

## 2022-09-14 PROCEDURE — 25000003 PHARM REV CODE 250: Performed by: EMERGENCY MEDICINE

## 2022-09-14 PROCEDURE — 80053 COMPREHEN METABOLIC PANEL: CPT | Performed by: STUDENT IN AN ORGANIZED HEALTH CARE EDUCATION/TRAINING PROGRAM

## 2022-09-14 PROCEDURE — 99900035 HC TECH TIME PER 15 MIN (STAT)

## 2022-09-14 PROCEDURE — 27000221 HC OXYGEN, UP TO 24 HOURS

## 2022-09-14 PROCEDURE — 96367 TX/PROPH/DG ADDL SEQ IV INF: CPT

## 2022-09-14 PROCEDURE — 25000242 PHARM REV CODE 250 ALT 637 W/ HCPCS: Performed by: EMERGENCY MEDICINE

## 2022-09-14 PROCEDURE — 82803 BLOOD GASES ANY COMBINATION: CPT

## 2022-09-14 RX ORDER — IPRATROPIUM BROMIDE AND ALBUTEROL SULFATE 2.5; .5 MG/3ML; MG/3ML
3 SOLUTION RESPIRATORY (INHALATION)
Status: COMPLETED | OUTPATIENT
Start: 2022-09-14 | End: 2022-09-14

## 2022-09-14 RX ORDER — METOPROLOL SUCCINATE 200 MG/1
200 TABLET, EXTENDED RELEASE ORAL 2 TIMES DAILY
Status: ON HOLD | COMMUNITY
Start: 2022-08-26 | End: 2023-04-30 | Stop reason: HOSPADM

## 2022-09-14 RX ORDER — AMOXICILLIN AND CLAVULANATE POTASSIUM 875; 125 MG/1; MG/1
1 TABLET, FILM COATED ORAL 2 TIMES DAILY
Qty: 20 TABLET | Refills: 0 | Status: ON HOLD | OUTPATIENT
Start: 2022-09-14 | End: 2022-09-27 | Stop reason: HOSPADM

## 2022-09-14 RX ADMIN — SODIUM CHLORIDE 1000 ML: 0.9 INJECTION, SOLUTION INTRAVENOUS at 12:09

## 2022-09-14 RX ADMIN — IPRATROPIUM BROMIDE AND ALBUTEROL SULFATE 3 ML: 2.5; .5 SOLUTION RESPIRATORY (INHALATION) at 10:09

## 2022-09-14 RX ADMIN — IOHEXOL 60 ML: 350 INJECTION, SOLUTION INTRAVENOUS at 05:09

## 2022-09-14 RX ADMIN — PIPERACILLIN SODIUM AND TAZOBACTAM SODIUM 4.5 G: 4; .5 INJECTION, POWDER, LYOPHILIZED, FOR SOLUTION INTRAVENOUS at 10:09

## 2022-09-14 RX ADMIN — VANCOMYCIN HYDROCHLORIDE 1500 MG: 1.5 INJECTION, POWDER, LYOPHILIZED, FOR SOLUTION INTRAVENOUS at 11:09

## 2022-09-14 NOTE — ED TRIAGE NOTES
Transfer from [facility] for ENT consult. Pt send to ED for increased secretions and blood clots in his trach. Pt was suctioned and inner canula changed at [facility]. Pt is on his home O2 of 5L. VSS, NAD.

## 2022-09-14 NOTE — ED PROVIDER NOTES
Patient signed out to me at the change of shift by Dr. Camacho, while pending transfer to [facility].     In brief, 72-year-old male with PMH of SCC of tongue s/p total glossectomy with tracheostomy 01/2022 (home O2 5L), COPD, and hypertension presents with hemoptysis which began at 9 pm last night. Per the patient's wife, the bleeding initially began as blood 10 sputum form his tracheostomy canula, which gradually progressed to patient passing a small amount of blood clots. Since being in the ED, patient has not had any further bleeding.  However, respiratory therapist in the ED did attempt to change the patient's cannula in the wife reports a small amount of blood-tinged sputum was noted.  Labs obtained during the previous shift consistent with the patient's baseline, which include a hemoglobin of 8.4 and hematocrit of 27.9.  Coags pending.  Patient also had a chest x-ray which showed bibasilar opacities suspicious for aspiration versus infection.  There is also a small effusion noted.  A CTA of the chest was also obtained which showed findings consistent with aspiration.  There was also mention of a mild aneurysm of the descending thoracic aorta measuring 4.2 cm which appears stable when compared to previous imaging from August 18, 2022 but slightly increased in size when compared to previous imaging from last year.     On my exam, patient is satting 96 and 98% on 5 L blow-by mask.  He appears nontoxic, and in no respiratory acute distress.  Lungs with decreased breath sounds diffusely.  Abdomen soft, nontender, no distension.  Patient moving all extremities, no focal neurological deficits.      Case was discussed with Cindy with the transfer center who connected me with ENT, Dr. Smalls.  Dr. Smalls recommended patient be transferred ED to ED for evaluation if warranted.  Given history and presentation in the setting of patient's workup here today, I feel patient would benefit from transfer for ENT evaluation.   Patient and his wife were updated of the plan of transfer to [facility].  They are agreeable to plan.  Will obtain a COVID test prior to transfer.  Despite CTA findings will defer antibiotics to admission team at this time given patient has no other infectious symptoms.      Kirsten Parry, DO  7:19 AM      UPDATE  [facility] present at bedside to transport patient to [facility].  Patient appears to be in no respiratory distress.  Family requesting patient be provided his daily home medicines.  Will defer medications to admitting team once patient evaluated at [facility].    Kirsten Parry D.O  EMERGENCY MEDICINE   8:44 AM 09/14/2022       Kirsten Parry, DO  09/14/22 0845       Kirsten Parry, DO  09/14/22 0845

## 2022-09-14 NOTE — ED PROVIDER NOTES
Encounter Date: 9/14/2022       History     Chief Complaint   Patient presents with    Tracheostomy Tube Change     Pt arrived via ems, pt chief complaint is a Trach Problem. Per ems pt has had increased secretions and family states they have been having to change out tube more frequently. Per family trach site is bleeding and wants it evaluated.     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  The hemoptysis is new since 9:00 p.m., acute onset, intermittent, without known aggravating or relieving factors, and associated with mild shortness of breath.  Patient's wife states that he has had increased secretions and has had to change at the 2 more frequently.  She brought pictures of the blood from earlier.  She change the cannula twice this evening.  Patient denies chest pain, fever, chills, abdominal pain, nausea, vomiting, dysuria, hematuria, diarrhea, constipation, and black/bloody stools.  He does report a productive cough.  He is currently steroids. He is followed by multiple specialists.     The history is provided by the patient and the spouse.   Review of patient's allergies indicates:  No Known Allergies  Past Medical History:   Diagnosis Date    Cancer     skin to Rt forearm and face    COPD (chronic obstructive pulmonary disease)     Hyperlipidemia     Hypertension     Pseudoaneurysm      Past Surgical History:   Procedure Laterality Date    ABDOMINAL SURGERY      stents placed in liver and large intestines, per patient    CAROTID STENT      CORONARY STENT PLACEMENT  01/2000    DISSECTION OF NECK Bilateral 1/4/2022    Procedure: DISSECTION, NECK;  Surgeon: Naeem Smalls MD;  Location: [facility] OR [address] [place] [address];  Service: ENT;   Laterality: Bilateral;    ESOPHAGOGASTRODUODENOSCOPY W/ PEG N/A 2022    Procedure: EGD, WITH PEG TUBE INSERTION;  Surgeon: Anthony Calabrese MD;  [facility];  Service: General;  Laterality: N/A;    EYE SURGERY      Cataract bilateral    femoral stents      bilateral    FLAP PROCEDURE N/A 1/3/2022    Procedure: CREATION, FREE FLAP;  Surgeon: Naeem Smalls MD;  Location: [facility];  Service: ENT;  Laterality: N/A;    FLAP PROCEDURE Right 2022    Procedure: CREATION, FREE FLAP;  Surgeon: Stacey Conde MD;  Location: [facility];  Service: ENT;  Laterality: Right;  Ischemic start 1203  Ischemic stop 1353    GLOSSECTOMY N/A 2022    Procedure: GLOSSECTOMY;  Surgeon: Naeem Smalls MD;  Location: [facility];  Service: ENT;  Laterality: N/A;    RADICAL NECK DISSECTION Left 1/3/2022    Procedure: DISSECTION, NECK, RADICAL;  Surgeon: Naeem Smalls MD;  Location: [facility];  Service: ENT;  Laterality: Left;    SKIN CANCER EXCISION      TRACHEOTOMY N/A 2022    Procedure: TRACHEOTOMY;  Surgeon: Naeem Smalls MD;  Location: [facility] OR [address] [place] [address];  Service: ENT;  Laterality: N/A;     No family history on file.  Social History     Tobacco Use    Smoking status: Former     Packs/day: 2.00     Years: 40.00     Pack years: 80.00     Types: Cigarettes     Start date: 1963     Quit date: 2018     Years since quittin.4    Smokeless tobacco: Never    Tobacco comments:     3/3 ppd x 40 yrs. Currently 3-4 cigarettes daily .He is trying  to quit. Is using a Vapor cigarettes  2-3 x's a day.   Substance Use Topics    Alcohol use: Not Currently     Alcohol/week: 3.0 standard drinks     Types: 3 Cans of beer per week     Comment: beer daily 3-4    Drug use: No     Review of Systems   Constitutional:  Negative for chills and fever.   HENT:  Negative for congestion and sore throat.    Eyes:  Negative for pain and visual disturbance.   Respiratory:  Positive for  cough and shortness of breath.    Cardiovascular:  Negative for chest pain and leg swelling.   Gastrointestinal:  Negative for abdominal pain, blood in stool, constipation, diarrhea, nausea and vomiting.   Endocrine: Negative for polydipsia and polyuria.   Genitourinary:  Negative for dysuria and hematuria.   Musculoskeletal:  Negative for arthralgias and back pain.   Skin:  Negative for color change and rash.   Neurological:  Negative for dizziness, syncope, weakness and headaches.     Physical Exam     Initial Vitals [09/14/22 0437]   BP Pulse Resp Temp SpO2   (!) 140/90 72 20 98.2 °F (36.8 °C) 95 %      MAP       --         Physical Exam    Nursing note and vitals reviewed.  Constitutional: He appears well-developed and well-nourished. He is not diaphoretic. No distress.   HENT:   Head: Normocephalic and atraumatic.   Eyes: Conjunctivae and EOM are normal. Pupils are equal, round, and reactive to light.   Neck: Neck supple. No JVD present.   Trach site without active bleeding, no dried blood. No surrounding redness, warmth, or TTP   Normal range of motion.  Cardiovascular:  Normal rate, regular rhythm, normal heart sounds and intact distal pulses.           No murmur heard.  Pulmonary/Chest: Breath sounds normal. No respiratory distress. He has no wheezes. He has no rhonchi. He has no rales.   No increased WOB or tachypnea   Abdominal: Abdomen is soft. He exhibits no distension. There is no abdominal tenderness. There is no rebound and no guarding.   Musculoskeletal:         General: No tenderness or edema.      Cervical back: Normal range of motion and neck supple.     Neurological: He is alert and oriented to person, place, and time.   Skin: Skin is warm and dry. Capillary refill takes less than 2 seconds.       ED Course   Procedures  Labs Reviewed   CBC W/ AUTO DIFFERENTIAL - Abnormal; Notable for the following components:       Result Value    RBC 3.43 (*)     Hemoglobin 8.4 (*)     Hematocrit 27.9 (*)      MCV 81 (*)     MCH 24.5 (*)     MCHC 30.1 (*)     RDW 16.4 (*)     MPV 9.0 (*)     Gran # (ANC) 9.2 (*)     Immature Grans (Abs) 0.05 (*)     Lymph # 0.6 (*)     Gran % 84.6 (*)     Lymph % 5.8 (*)     All other components within normal limits   COMPREHENSIVE METABOLIC PANEL - Abnormal; Notable for the following components:    CO2 35 (*)     Glucose 122 (*)     BUN 34 (*)     Albumin 2.7 (*)     Anion Gap 6 (*)     All other components within normal limits   B-TYPE NATRIURETIC PEPTIDE - Abnormal; Notable for the following components:     (*)     All other components within normal limits   ISTAT PROCEDURE - Abnormal; Notable for the following components:    POC PCO2 62.0 (*)     POC PO2 15 (*)     POC HCO3 35.7 (*)     POC SATURATED O2 16 (*)     POC TCO2 38 (*)     All other components within normal limits   TROPONIN I   PROTIME-INR   APTT   SARS-COV-2 RDRP GENE   SARS-COV-2 RDRP GENE        ECG Results              EKG 12-lead (Final result)  Result time 09/14/22 18:00:13      Final result by Interface, Lab In [facility] (09/14/22 18:00:13)                   Narrative:    Test Reason : R07.9,    Vent. Rate : 072 BPM     Atrial Rate : 072 BPM     P-R Int : 170 ms          QRS Dur : 092 ms      QT Int : 386 ms       P-R-T Axes : 055 -40 058 degrees     QTc Int : 422 ms    Normal sinus rhythm  Left axis deviation  Incomplete right bundle branch block  Possible Anterior infarct ,age undetermined  Abnormal ECG  When compared with ECG of 26-AUG-2022 13:49,  No significant change was found  Confirmed by Tacos Benitez MD ([id number]) on 9/14/2022 6:00:07 PM    Referred By: [email or web address]   SELF           Confirmed By:Tacos Benitez MD                                  Imaging Results              X-Ray Chest AP Portable (Final result)  Result time 09/14/22 06:41:53      Final result by Parmjit Ling DO (09/14/22 06:41:53)                   Impression:      Bibasilar opacities suspicious for aspiration or infection.    Small  effusions.      Electronically signed by: Parmjit Ling  Date:    09/14/2022  Time:    06:41               Narrative:    EXAMINATION:  XR CHEST AP PORTABLE    CLINICAL HISTORY:  SOB, hemoptysis;    TECHNIQUE:  Single frontal view of the chest was performed.    COMPARISON:  None    FINDINGS:  Tracheostomy tube terminates approximately 7 cm proximal to the sherine.    There are small bilateral pleural effusions, left greater than right.  There is nonspecific chronic coarse interstitial prominence.  There are bibasilar opacities suspicious for aspiration or infection.    The cardiac silhouette is enlarged.  There are atherosclerotic calcifications of the thoracic aorta.    The visualized osseous structures demonstrate degenerative changes.    There are surgical clips in the lower neck soft tissues.                                       CTA Chest Non-Coronary (PE Study) (Final result)  Result time 09/14/22 06:13:38      Final result by Parmjit Ling DO (09/14/22 06:13:38)                   Impression:      1. No evidence of pulmonary embolism.  2. Findings compatible with aspiration.  3. Mild aneurysm of the descending thoracic aorta measuring 4.2 cm.  4. Mediastinal and right hilar lymphadenopathy, stable.  5. Moderate to advanced emphysematous changes.      Electronically signed by: Parmjit Ling  Date:    09/14/2022  Time:    06:13               Narrative:    EXAMINATION:  CTA CHEST NON CORONARY    CLINICAL HISTORY:  Pulmonary embolism (PE) suspected, high prob;    TECHNIQUE:  Low dose axial images, sagittal and coronal reformations were obtained from the thoracic inlet to the lung bases following the IV administration of 60 mL of Omnipaque 350.  Contrast timing was optimized to evaluate the pulmonary arteries.  Maximum intensity projection images were provided for review.    COMPARISON:  CT chest, abdomen, and pelvis from 01/06/2017.  CT chest from 12/16/2021. PET-CT from 08/18/2022.    FINDINGS:  Pulmonary  vasculature: Satisfactory opacification of the pulmonary arterial system with no filling defect to the segmental level.    Aorta: Left-sided aortic arch.  There is a mild aneurysm of the descending thoracic aorta measuring up to 4.2 cm, unchanged from 08/18/2022 but slightly progressed in size from 12/16/2021 at which time it measured 3.9 cm.  There is no dissection there is severe calcified atherosclerosis.  There are stents in the celiac artery and in the SMA.    Base of Neck: No significant abnormality.    Thoracic soft tissues: Normal.    Heart: Cardiomegaly.  There is no pericardial effusion.  There are calcifications of the coronary arteries.    Darcy/Mediastinum: There is a precarinal lymph node measuring 1.3 cm (series 2, image 211), unchanged in size from prior.  There is a right hilar node measuring 1.0 cm (series 2, image 277).    Airways: There is a tracheostomy tube.  There is frothy fluid in the left mainstem bronchus, with multiple foci of endobronchial filling in the left lower lobe and right lower lobe.    Lungs/Pleura: There are moderate to advanced centrilobular and paraseptal emphysematous changes.  There is tree-in-bud micro nodularity in the bilateral lower lobes with several areas of mild ground-glass opacities in the bilateral lungs.  Findings are compatible with aspiration.  There is a small left pleural effusion.  There is no pneumothorax or pleural thickening.    Esophagus: Normal.    Upper Abdomen: No abnormality of the partially imaged upper abdomen.    Bones: No acute fracture. No suspicious lytic or sclerotic lesions.                                       Medications   iohexoL (OMNIPAQUE 350) injection 60 mL (60 mLs Intravenous Given 9/14/22 0550)   albuterol-ipratropium 2.5 mg-0.5 mg/3 mL nebulizer solution 3 mL (3 mLs Nebulization Given 9/14/22 1011)   vancomycin 1.5 g in dextrose 5 % 250 mL IVPB (ready to mix) (0 mg Intravenous Stopped 9/14/22 1310)   piperacillin-tazobactam 4.5 g in  sodium chloride 0.9% 100 mL IVPB (ready to mix system) (0 g Intravenous Stopped 9/14/22 1125)   sodium chloride 0.9% bolus 1,000 mL (0 mLs Intravenous Stopped 9/14/22 1310)     Medical Decision Making:   History:   Old Medical Records: I decided to obtain old medical records.  Old Records Summarized: records from clinic visits.       <> Summary of Records: Was seen by ENT one week ago and had trach exchanged  Initial Assessment:   72M s/p trach on 5L O2 at home and SCC of tongue s/p glossectomy presents with new hemoptysis and productive cough, afebrile and hemodynamically stable  - labs, CXR  - CTA PE study   Differential Diagnosis:   PE, malignancy/mets to lung, pneumonia, tracheal trauma  Clinical Tests:   Lab Tests: Ordered  Radiological Study: Ordered  Medical Tests: Ordered  ED Management:  Pt care handed assumed solely by attending with entire workup pending.   Additional MDM:     Well's Criteria Score:  -Clinical symptoms of DVT (leg swelling, pain with palpation) = 0.0  -Other diagnosis less likely than pulmonary embolism =            3.0  -Heart Rate >100 =   0.0  -Immobilization (= or > than 3 days) or surgery in the previous 4 weeks = 0.0  -Previous DVT/PE = 0.0  -Hemoptysis =          1.0  -Malignancy =           1.0  Well's Probability Score =    5            ED Course as of 09/14/22 2110   Wed Sep 14, 2022   0519 EKG:  Rate 72, regular rhythm, sinus rhythm, intervals within normal limits, incomplete right bundle-branch block, left axis deviation, no ST elevations or depressions noted, similar to previous. [CC]   0607 Hemoglobin(!): 8.4  Improved from 3 weeks ago. [CC]   0607 CO2(!): 35  Appears chronic. [CC]   0607 Troponin I: 0.010  Normal. [CC]   0636 Patient presents with gross hemoptysis.  His wife has removed the cannula multiple times throughout the night that is been clogged with blood clots.  Patient is satting well on is not in any respiratory distress at bedside.  No leukocytosis.  Afebrile.  " Doubt infectious etiology.  CTA with concerns for "aspiration ".  I believe this is likely blood given clinical context.  Likely from lung metastases.  Patient does have head neck cancer that he has been treated for.  I discussed the case with the hospitalists here at the [facility] we agree that patient would likely benefit from transfer given high risk of decompensation.  Patient's hemoglobin is 8.4.  This is improved from 3 weeks ago but still is anemic.  Troponin is normal.  Doubt ACS.  EKG is nonischemic.  Plan to transfer patient at this time.  Patient amenable to this plan. 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

## 2022-09-14 NOTE — ASSESSMENT & PLAN NOTE
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 Has not had any further bleeding since arrival to ED. Bedside tracheoscopy without signs of active source of bleeding, pt refused flexible laryngoscopy to evaluate for possible sources of bleeding above tracheostomy. Imaging consistent with chronic aspiration.     - No acute intervention at this time  - Follow up with Dr. Smalls as previously scheduled   - Strict return precautions given   - Ok for DC from ENT standpoint   - Please page ENT with any questions or concerns

## 2022-09-14 NOTE — ED PROVIDER NOTES
Encounter Date: 9/14/2022       History     Chief Complaint   Patient presents with    Tracheostomy Tube Change     Pt arrived via ems, pt chief complaint is a Trach Problem. Per ems pt has had increased secretions and family states they have been having to change out tube more frequently. Per family trach site is bleeding and wants it evaluated.     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  Patient's wife reports that patient initially have blood tinged sputum from the tracheostomy cannula, then gradually progress to small amount of blood clots. She changed the cannula twice last evening. Patient was seen in [facility] ED, they changed the cannula, noticed to have small amount of blood-tinged sputum. Stable hemoglobin, cxr showed bilateral basilar opacities suspicious for aspiration vs. Infection; CTA negative for PE. They discussed with Dr. Smalls, ENT, recommended trasfer to [facility] for further ENT work up. On arrival, patient denies SOB, chest pain, or confusion, trach collar in place on 5L, no visible hemorrhage, SpO2 90s%.  Review of patient's allergies indicates:  No Known Allergies  Past Medical History:   Diagnosis Date    Cancer     skin to Rt forearm and face    COPD (chronic obstructive pulmonary disease)     Hyperlipidemia     Hypertension     Pseudoaneurysm      Past Surgical History:   Procedure Laterality Date    ABDOMINAL SURGERY      stents placed in liver and large intestines, per patient    CAROTID STENT      CORONARY STENT PLACEMENT  01/2000    DISSECTION OF NECK Bilateral 1/4/2022    Procedure: DISSECTION, NECK;  Surgeon: Naeem MONTIEL  MD Slim;  [address]  Service: ENT;  Laterality: Bilateral;    ESOPHAGOGASTRODUODENOSCOPY W/ PEG N/A 2022    Procedure: EGD, WITH PEG TUBE INSERTION;  Surgeon: Anthony Calabrese MD;  [facility];  Service: General;  Laterality: N/A;    EYE SURGERY      Cataract bilateral    femoral stents      bilateral    FLAP PROCEDURE N/A 1/3/2022    Procedure: CREATION, FREE FLAP;  Surgeon: Naeem Smalls MD;  [facility];  Service: ENT;  Laterality: N/A;    FLAP PROCEDURE Right 2022    Procedure: CREATION, FREE FLAP;  Surgeon: Stacey Conde MD;  [facility];  Service: ENT;  Laterality: Right;  Ischemic start 1203  Ischemic stop 1353    GLOSSECTOMY N/A 2022    Procedure: GLOSSECTOMY;  Surgeon: Naeem Smalls MD;  Location: [address];  Service: ENT;  Laterality: N/A;    RADICAL NECK DISSECTION Left 1/3/2022    Procedure: DISSECTION, NECK, RADICAL;  Surgeon: Naeem Smalls MD;  Location: [address];  Service: ENT;  Laterality: Left;    SKIN CANCER EXCISION      TRACHEOTOMY N/A 2022    Procedure: TRACHEOTOMY;  Surgeon: Naeem Smalls MD;  Location: [address];  Service: ENT;  Laterality: N/A;     No family history on file.  Social History     Tobacco Use    Smoking status: Former     Packs/day: 2.00     Years: 40.00     Pack years: 80.00     Types: Cigarettes     Start date: 1963     Quit date: 2018     Years since quittin.4    Smokeless tobacco: Never    Tobacco comments:     3/3 ppd x 40 yrs. Currently 3-4 cigarettes daily .He is trying  to quit. Is using a Vapor cigarettes  2-3 x's a day.   Substance Use Topics    Alcohol use: Not Currently     Alcohol/week: 3.0 standard drinks     Types: 3 Cans of beer per week     Comment: beer daily 3-4    Drug use: No     Review of Systems   Constitutional:  Negative for chills and fever.   HENT:  Negative for congestion and sore throat.    Eyes:  Negative for  pain and visual disturbance.   Respiratory:  Positive for shortness of breath.    Cardiovascular:  Negative for chest pain and palpitations.   Gastrointestinal:  Negative for abdominal pain, nausea and vomiting.   Genitourinary:  Negative for dysuria and flank pain.   Musculoskeletal:  Negative for back pain and neck pain.   Skin:  Negative for rash.   Neurological:  Negative for weakness and headaches.   Psychiatric/Behavioral:  Negative for behavioral problems and confusion.      Physical Exam     Initial Vitals [09/14/22 0437]   BP Pulse Resp Temp SpO2   (!) 140/90 72 20 98.2 °F (36.8 °C) 95 %      MAP       --         Physical Exam    Nursing note and vitals reviewed.  Constitutional: He appears cachectic. No distress.   HENT:   Head: Normocephalic and atraumatic.   Mouth/Throat: Oropharynx is clear and moist.   Eyes: Conjunctivae and EOM are normal.   Neck: No JVD present.   Normal range of motion.  Cardiovascular:  Normal rate, regular rhythm, normal heart sounds and intact distal pulses.           Pulmonary/Chest:   No bleeding visible   Diminished breath sounds bilaterally, left worse than right.    Abdominal: Abdomen is soft. He exhibits no distension. There is no abdominal tenderness.   Musculoskeletal:         General: No tenderness or edema. Normal range of motion.      Cervical back: Normal range of motion.     Neurological: He is alert and oriented to person, place, and time. He has normal strength.   Skin: Skin is warm and dry. Capillary refill takes less than 2 seconds.       ED Course   Procedures  Labs Reviewed   CBC W/ AUTO DIFFERENTIAL - Abnormal; Notable for the following components:       Result Value    RBC 3.43 (*)     Hemoglobin 8.4 (*)     Hematocrit 27.9 (*)     MCV 81 (*)     MCH 24.5 (*)     MCHC 30.1 (*)     RDW 16.4 (*)     MPV 9.0 (*)     Gran # (ANC) 9.2 (*)     Immature Grans (Abs) 0.05 (*)     Lymph # 0.6 (*)     Gran % 84.6 (*)     Lymph % 5.8 (*)     All other components within  normal limits   COMPREHENSIVE METABOLIC PANEL - Abnormal; Notable for the following components:    CO2 35 (*)     Glucose 122 (*)     BUN 34 (*)     Albumin 2.7 (*)     Anion Gap 6 (*)     All other components within normal limits   B-TYPE NATRIURETIC PEPTIDE - Abnormal; Notable for the following components:     (*)     All other components within normal limits   ISTAT PROCEDURE - Abnormal; Notable for the following components:    POC PCO2 62.0 (*)     POC PO2 15 (*)     POC HCO3 35.7 (*)     POC SATURATED O2 16 (*)     POC TCO2 38 (*)     All other components within normal limits   TROPONIN I   PROTIME-INR   APTT   SARS-COV-2 RDRP GENE   SARS-COV-2 RDRP GENE          Imaging Results              X-Ray Chest AP Portable (Final result)  Result time 09/14/22 06:41:53      Final result by Parmjit Ling DO (09/14/22 06:41:53)                   Impression:      Bibasilar opacities suspicious for aspiration or infection.    Small effusions.      Electronically signed by: Parmjit Ling  Date:    09/14/2022  Time:    06:41               Narrative:    EXAMINATION:  XR CHEST AP PORTABLE    CLINICAL HISTORY:  SOB, hemoptysis;    TECHNIQUE:  Single frontal view of the chest was performed.    COMPARISON:  None    FINDINGS:  Tracheostomy tube terminates approximately 7 cm proximal to the sherine.    There are small bilateral pleural effusions, left greater than right.  There is nonspecific chronic coarse interstitial prominence.  There are bibasilar opacities suspicious for aspiration or infection.    The cardiac silhouette is enlarged.  There are atherosclerotic calcifications of the thoracic aorta.    The visualized osseous structures demonstrate degenerative changes.    There are surgical clips in the lower neck soft tissues.                                       CTA Chest Non-Coronary (PE Study) (Final result)  Result time 09/14/22 06:13:38      Final result by Parmjit Ling DO (09/14/22 06:13:38)                    Impression:      1. No evidence of pulmonary embolism.  2. Findings compatible with aspiration.  3. Mild aneurysm of the descending thoracic aorta measuring 4.2 cm.  4. Mediastinal and right hilar lymphadenopathy, stable.  5. Moderate to advanced emphysematous changes.      Electronically signed by: Parmjit Ling  Date:    09/14/2022  Time:    06:13               Narrative:    EXAMINATION:  CTA CHEST NON CORONARY    CLINICAL HISTORY:  Pulmonary embolism (PE) suspected, high prob;    TECHNIQUE:  Low dose axial images, sagittal and coronal reformations were obtained from the thoracic inlet to the lung bases following the IV administration of 60 mL of Omnipaque 350.  Contrast timing was optimized to evaluate the pulmonary arteries.  Maximum intensity projection images were provided for review.    COMPARISON:  CT chest, abdomen, and pelvis from 01/06/2017.  CT chest from 12/16/2021. PET-CT from 08/18/2022.    FINDINGS:  Pulmonary vasculature: Satisfactory opacification of the pulmonary arterial system with no filling defect to the segmental level.    Aorta: Left-sided aortic arch.  There is a mild aneurysm of the descending thoracic aorta measuring up to 4.2 cm, unchanged from 08/18/2022 but slightly progressed in size from 12/16/2021 at which time it measured 3.9 cm.  There is no dissection there is severe calcified atherosclerosis.  There are stents in the celiac artery and in the SMA.    Base of Neck: No significant abnormality.    Thoracic soft tissues: Normal.    Heart: Cardiomegaly.  There is no pericardial effusion.  There are calcifications of the coronary arteries.    Darcy/Mediastinum: There is a precarinal lymph node measuring 1.3 cm (series 2, image 211), unchanged in size from prior.  There is a right hilar node measuring 1.0 cm (series 2, image 277).    Airways: There is a tracheostomy tube.  There is frothy fluid in the left mainstem bronchus, with multiple foci of endobronchial filling in the left  lower lobe and right lower lobe.    Lungs/Pleura: There are moderate to advanced centrilobular and paraseptal emphysematous changes.  There is tree-in-bud micro nodularity in the bilateral lower lobes with several areas of mild ground-glass opacities in the bilateral lungs.  Findings are compatible with aspiration.  There is a small left pleural effusion.  There is no pneumothorax or pleural thickening.    Esophagus: Normal.    Upper Abdomen: No abnormality of the partially imaged upper abdomen.    Bones: No acute fracture. No suspicious lytic or sclerotic lesions.                                       Medications   iohexoL (OMNIPAQUE 350) injection 60 mL (60 mLs Intravenous Given 9/14/22 0550)   albuterol-ipratropium 2.5 mg-0.5 mg/3 mL nebulizer solution 3 mL (3 mLs Nebulization Given 9/14/22 1011)   vancomycin 1.5 g in dextrose 5 % 250 mL IVPB (ready to mix) (0 mg Intravenous Stopped 9/14/22 1310)   piperacillin-tazobactam 4.5 g in sodium chloride 0.9% 100 mL IVPB (ready to mix system) (0 g Intravenous Stopped 9/14/22 1125)   sodium chloride 0.9% bolus 1,000 mL (0 mLs Intravenous Stopped 9/14/22 1310)     Medical Decision Making:   History:   Old Medical Records: I decided to obtain old medical records.  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  Patient alert and oriented, no respiratory distress, patient is hypoxia on arrival recurrent 10 L oxygen via trach collar.  Differential Diagnosis:   Aspiration pneumonia, hemorrhage from bronchitis, malignancy, trach trauma.  Clinical Tests:   Lab Tests: Reviewed  Radiological Study: Reviewed  Medical Tests: Reviewed  ED Management:  ENT evaluated patient, patient refused to have flexible scope through nose but intraorally without evidence of bleeding source,  recommended discharge home from this standpoint.  Patient has increased oxygen supplement in the ED, recommended to be admitted to the hospital for observation.  Patient and his white decided to leave AMA, given that they can give him oxygen at home, his wife also the main caregiver who takes care of his trach and PEG at home.  Patient has capacity to make his decision, understands risks and benefits of being discharge home versus admission.  Patient is discharged in stable condition, oxygen was able to be weaned down to 5 L. provided discharge instruction and return to the precaution.  No other concerns.          Attending Attestation:   Physician Attestation Statement for Resident:  As the supervising MD   Physician Attestation Statement: I have personally seen and examined this patient.   I agree with the above history.  -: Patient's wife notes that patient requires 5 L via trach shield at home and had a saturate in the low 90s as low as 88%.  While in the ED, he required 10 L O2 via trach shield.  He did receive broad-spectrum antibiotics for aspiration pneumonia.  Patient refused inpatient admission.  Patient has capacity to make his medical decisions.  Will discharge AMA on course of Augmentin.   As the supervising MD I agree with the above PE.     As the supervising MD I agree with the above treatment, course, plan, and disposition.                    ED Course as of 09/14/22 1608   Wed Sep 14, 2022   0519 EKG:  Rate 72, regular rhythm, sinus rhythm, intervals within normal limits, incomplete right bundle-branch block, left axis deviation, no ST elevations or depressions noted, similar to previous. [CC]   0607 Hemoglobin(!): 8.4  Improved from 3 weeks ago. [CC]   0607 CO2(!): 35  Appears chronic. [CC]   0607 Troponin I: 0.010  Normal. [CC]   0636 Patient presents with gross hemoptysis.  His wife has removed the cannula multiple times throughout the night that is been clogged with blood clots.  Patient is  "satting well on is not in any respiratory distress at bedside.  No leukocytosis.  Afebrile.  Doubt infectious etiology.  CTA with concerns for "aspiration ".  I believe this is likely blood given clinical context.  Likely from lung metastases.  Patient does have head neck cancer that he has been treated for.  I discussed the case with the hospitalists here at the [facility] we agree that patient would likely benefit from transfer given high risk of decompensation.  Patient's hemoglobin is 8.4.  This is improved from 3 weeks ago but still is anemic.  Troponin is normal.  Doubt ACS.  EKG is nonischemic.  Plan to transfer patient at this time.  Patient amenable to this plan. 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

## 2022-09-14 NOTE — ED NOTES
Discharge plan reviewed with pt and wife. Requesting to be suctioned once more before leaving. RT notified. Wife to car to get pt's clothes.

## 2022-09-14 NOTE — HPI
72-year-old male with PMH of SCC of tongue s/p total glossectomy with tracheostomy 01/2022 (home O2 5L), COPD, and HTN transferred [place] [facility] [place] evaluation of bloody secretions from his tracheostomy. Pt initially presented to [facility] ED with hemoptysis which began at 9 pm last night. Per the patient's wife, the bleeding initially began as bloody sputum form his tracheostomy canula, which gradually progressed to patient passing a small amount of blood clots. Per wife, pt has also had increased amount of secretions over past 2 weeks since starting prednisone. Since being in the ED, patient has not had any further bleeding. Labs at OSH consistent with the patient's baseline, with H/H of 8.4/27.9.  Patient also had a chest x-ray which showed bibasilar opacities suspicious for aspiration versus infection.  CTA showed findings consistent with aspiration.      Pt denies any worsening SOB, blood tinged saliva, blood in oral secretions, epistaxis, intraoral bleeding, increasing intraoral pain. Of note, he was seen in H&N clinic by Dr. Smalls just last week for routine trach change, which was done without any issue.

## 2022-09-14 NOTE — SUBJECTIVE & OBJECTIVE
Medications:  Continuous Infusions:  Scheduled Meds:  PRN Meds:     No current facility-administered medications on file prior to encounter.     Current Outpatient Medications on File Prior to Encounter   Medication Sig    aspirin 81 MG Chew 1 tablet (81 mg total) by Per G Tube route once daily.    atorvastatin (LIPITOR) 20 MG tablet 1 tablet (20 mg total) by Per G Tube route once daily.    bisacodyL (DULCOLAX) 10 mg Supp Place 1 suppository (10 mg total) rectally daily as needed.    clopidogreL (PLAVIX) 75 mg tablet 1 tablet (75 mg total) by Per G Tube route once daily.    glycopyrrolate (CUVPOSA) 1 mg/5 mL (0.2 mg/mL) Soln Take 5 mLs (1 mg total) by mouth 3 (three) times daily as needed (TO REDUCE SECRETIONS).    guaiFENesin 200 mg/5 mL Liqd Take 400 mg by mouth every 4 (four) hours as needed (for secretions).    hydrOXYzine HCL (ATARAX) 25 MG tablet SMARTSI.5 Tablet(s) Gastro Tube Every 8 Hours PRN    melatonin (MELATIN) 3 mg tablet 2 tablets (6 mg total) by Per G Tube route nightly.    metoprolol succinate (TOPROL-XL) 200 MG 24 hr tablet Take 200 mg by mouth 2 (two) times daily.    ondansetron (ZOFRAN-ODT) 8 MG TbDL Take 1 tablet (8 mg total) by mouth every 8 (eight) hours as needed (nausea). (Patient not taking: Reported on 2022)    oxyCODONE (ROXICODONE) 5 mg/5 mL Soln 5 mLs (5 mg total) by Per G Tube route every 4 (four) hours as needed (Pain).    polyethylene glycol (GLYCOLAX) 17 gram PwPk 17 g by Per G Tube route 2 (two) times daily.    prednisone 5 mg/5 mL Soln Take 60 mLs (60 mg total) by mouth once daily for 7 days, THEN 40 mLs (40 mg total) once daily for 7 days, THEN 30 mLs (30 mg total) once daily for 7 days, THEN 20 mLs (20 mg total) once daily for 7 days, THEN 10 mLs (10 mg total) once daily for 7 days.    sodium chloride (OCEAN) 0.65 % nasal spray 1 spray by Nasal route as needed for Congestion.    WIXELA INHUB 250-50 mcg/dose diskus inhaler SMARTSI Puff(s) By Mouth Every 12 Hours     [DISCONTINUED] acetaminophen (TYLENOL) 325 MG tablet 2 tablets (650 mg total) by Per G Tube route every 6 (six) hours.    [DISCONTINUED] albuterol-ipratropium (DUO-NEB) 2.5 mg-0.5 mg/3 mL nebulizer solution Take 3 mLs by nebulization every 4 (four) hours as needed for Wheezing. Rescue    [DISCONTINUED] folic acid (FOLVITE) 1 MG tablet 1 tablet (1 mg total) by Per G Tube route once daily.    [DISCONTINUED] losartan (COZAAR) 50 MG tablet 1 tablet (50 mg total) by Per G Tube route once daily.    [DISCONTINUED] metoprolol tartrate (LOPRESSOR) 100 MG tablet 1 tablet (100 mg total) by Per G Tube route 2 (two) times daily.    [DISCONTINUED] thiamine 100 MG tablet 1 tablet (100 mg total) by Per G Tube route once daily.       Review of patient's allergies indicates:  No Known Allergies    Past Medical History:   Diagnosis Date    Cancer     skin to Rt forearm and face    COPD (chronic obstructive pulmonary disease)     Hyperlipidemia     Hypertension     Pseudoaneurysm      Past Surgical History:   Procedure Laterality Date    ABDOMINAL SURGERY      stents placed in liver and large intestines, per patient    CAROTID STENT      CORONARY STENT PLACEMENT  01/2000    DISSECTION OF NECK Bilateral 1/4/2022    Procedure: DISSECTION, NECK;  Surgeon: Naeem Smalls MD;  Location: [facility] OR [address] [place] [address];  Service: ENT;  Laterality: Bilateral;    ESOPHAGOGASTRODUODENOSCOPY W/ PEG N/A 1/4/2022    Procedure: EGD, WITH PEG TUBE INSERTION;  Surgeon: Anthony Calabrese MD;  Location: [address];  Service: General;  Laterality: N/A;    EYE SURGERY      Cataract bilateral    femoral stents      bilateral    FLAP PROCEDURE N/A 1/3/2022    Procedure: CREATION, FREE FLAP;  Surgeon: Naeem Smalls MD;  [facility];  Service: ENT;  Laterality: N/A;    FLAP PROCEDURE Right 1/4/2022    Procedure: CREATION, FREE FLAP;  Surgeon: Stacey Conde MD;  Location: [facility] OR [address] [place] [address];  Service: ENT;  Laterality: Right;  Ischemic start  1203  Ischemic stop 1353    GLOSSECTOMY N/A 2022    Procedure: GLOSSECTOMY;  Surgeon: Naeem Smalls MD;  [facility];  Service: ENT;  Laterality: N/A;    RADICAL NECK DISSECTION Left 1/3/2022    Procedure: DISSECTION, NECK, RADICAL;  Surgeon: Naeem Smalls MD;  [facility];  Service: ENT;  Laterality: Left;    SKIN CANCER EXCISION      TRACHEOTOMY N/A 2022    Procedure: TRACHEOTOMY;  Surgeon: Naeem Smalls MD;  [facility];  Service: ENT;  Laterality: N/A;     Family History    None       Tobacco Use    Smoking status: Former     Packs/day: 2.00     Years: 40.00     Pack years: 80.00     Types: Cigarettes     Start date: 1963     Quit date: 2018     Years since quittin.4    Smokeless tobacco: Never    Tobacco comments:     3/3 ppd x 40 yrs. Currently 3-4 cigarettes daily .He is trying  to quit. Is using a Vapor cigarettes  2-3 x's a day.   Substance and Sexual Activity    Alcohol use: Not Currently     Alcohol/week: 3.0 standard drinks     Types: 3 Cans of beer per week     Comment: beer daily 3-4    Drug use: No    Sexual activity: Not Currently     Review of Systems   HENT:  Negative for congestion, dental problem, drooling, ear discharge, ear pain, mouth sores, nosebleeds, sinus pressure and sinus pain.    Respiratory:  Positive for cough and wheezing. Negative for apnea and chest tightness.    All other systems reviewed and are negative.  Objective:     Vital Signs (Most Recent):  Temp: 97.6 °F (36.4 °C) (22 1310)  Pulse: 87 (22 1530)  Resp: (!) 28 (22 1530)  BP: 99/68 (22 1529)  SpO2: (!) 94 % (22 1530)   Vital Signs (24h Range):  Temp:  [97.6 °F (36.4 °C)-98.2 °F (36.8 °C)] 97.6 °F (36.4 °C)  Pulse:  [] 87  Resp:  [18-28] 28  SpO2:  [85 %-98 %] 94 %  BP: ()/(57-90) 99/68     Weight: 61.2 kg (135 lb)  Body mass index is 20.53 kg/m².        Physical Exam  Constitutional:       General:  He is not in acute distress.     Appearance: Normal appearance. He is not toxic-appearing.   HENT:      Head: Normocephalic and atraumatic.      Right Ear: External ear normal.      Left Ear: External ear normal.      Nose: Nose normal.      Mouth/Throat:      Comments: Intraoral flap well incorporated  Thick mucus along soft palate   Floor of mouth soft   No lesions or site of active bleeding seen   Neck:      Trachea: Tracheostomy present.      Comments: Bilateral incisions well healed   Size 6 uncuffed Shiley tracheostomy tube in place, secured with sherry collar  Blood tinged secretions present in trach inner cannula   Trach stoma well healed, no peristomal oozing or sites of bleeding identified   Neurological:      Mental Status: He is alert.       Significant Labs:  CBC:   Recent Labs   Lab 09/14/22  0459   WBC 10.89   RBC 3.43*   HGB 8.4*   HCT 27.9*      MCV 81*   MCH 24.5*   MCHC 30.1*       Significant Diagnostics:  I have reviewed all pertinent imaging results/findings within the past 24 hours.

## 2022-09-14 NOTE — CONSULTS
[facility] Dept  Otorhinolaryngology-Head & Neck Surgery  Consult Note    Patient Name: Fareed Richard Jr.  MRN: [id number]  Code Status: Prior  Admission Date: 9/14/2022  Hospital Length of Stay: 0 days  Attending Physician: Shayan Faye MD  Primary Care Provider: Kodi Tubbs MD    Patient information was obtained from patient, spouse/SO and ER records.     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 Pt initially presented to [facility] ED with hemoptysis which began at 9 pm last night. Per the patient's wife, the bleeding initially began as bloody sputum form his tracheostomy canula, which gradually progressed to patient passing a small amount of blood clots. Per wife, pt has also had increased amount of secretions over past 2 weeks since starting prednisone. Since being in the ED, patient has not had any further bleeding. Labs at OSH consistent with the patient's baseline, with H/H of 8.4/27.9.  Patient also had a chest x-ray which showed bibasilar opacities suspicious for aspiration versus infection.  CTA showed findings consistent with aspiration.      Pt denies any worsening SOB, blood tinged saliva, blood in oral secretions, epistaxis, intraoral bleeding, increasing intraoral pain. Of note, he was seen in H&N clinic by Dr. Smalls just last week for routine trach change, which was done without any issue.       Medications:  Continuous Infusions:  Scheduled Meds:  PRN Meds:     No current facility-administered medications on file prior to encounter.     Current Outpatient Medications on File Prior to Encounter    Medication Sig    aspirin 81 MG Chew 1 tablet (81 mg total) by Per G Tube route once daily.    atorvastatin (LIPITOR) 20 MG tablet 1 tablet (20 mg total) by Per G Tube route once daily.    bisacodyL (DULCOLAX) 10 mg Supp Place 1 suppository (10 mg total) rectally daily as needed.    clopidogreL (PLAVIX) 75 mg tablet 1 tablet (75 mg total) by Per G Tube route once daily.    glycopyrrolate (CUVPOSA) 1 mg/5 mL (0.2 mg/mL) Soln Take 5 mLs (1 mg total) by mouth 3 (three) times daily as needed (TO REDUCE SECRETIONS).    guaiFENesin 200 mg/5 mL Liqd Take 400 mg by mouth every 4 (four) hours as needed (for secretions).    hydrOXYzine HCL (ATARAX) 25 MG tablet SMARTSI.5 Tablet(s) Gastro Tube Every 8 Hours PRN    melatonin (MELATIN) 3 mg tablet 2 tablets (6 mg total) by Per G Tube route nightly.    metoprolol succinate (TOPROL-XL) 200 MG 24 hr tablet Take 200 mg by mouth 2 (two) times daily.    ondansetron (ZOFRAN-ODT) 8 MG TbDL Take 1 tablet (8 mg total) by mouth every 8 (eight) hours as needed (nausea). (Patient not taking: Reported on 2022)    oxyCODONE (ROXICODONE) 5 mg/5 mL Soln 5 mLs (5 mg total) by Per G Tube route every 4 (four) hours as needed (Pain).    polyethylene glycol (GLYCOLAX) 17 gram PwPk 17 g by Per G Tube route 2 (two) times daily.    prednisone 5 mg/5 mL Soln Take 60 mLs (60 mg total) by mouth once daily for 7 days, THEN 40 mLs (40 mg total) once daily for 7 days, THEN 30 mLs (30 mg total) once daily for 7 days, THEN 20 mLs (20 mg total) once daily for 7 days, THEN 10 mLs (10 mg total) once daily for 7 days.    sodium chloride (OCEAN) 0.65 % nasal spray 1 spray by Nasal route as needed for Congestion.    WIXELA INHUB 250-50 mcg/dose diskus inhaler SMARTSI Puff(s) By Mouth Every 12 Hours    [DISCONTINUED] acetaminophen (TYLENOL) 325 MG tablet 2 tablets (650 mg total) by Per G Tube route every 6 (six) hours.    [DISCONTINUED] albuterol-ipratropium (DUO-NEB) 2.5 mg-0.5 mg/3 mL nebulizer solution  Take 3 mLs by nebulization every 4 (four) hours as needed for Wheezing. Rescue    [DISCONTINUED] folic acid (FOLVITE) 1 MG tablet 1 tablet (1 mg total) by Per G Tube route once daily.    [DISCONTINUED] losartan (COZAAR) 50 MG tablet 1 tablet (50 mg total) by Per G Tube route once daily.    [DISCONTINUED] metoprolol tartrate (LOPRESSOR) 100 MG tablet 1 tablet (100 mg total) by Per G Tube route 2 (two) times daily.    [DISCONTINUED] thiamine 100 MG tablet 1 tablet (100 mg total) by Per G Tube route once daily.       Review of patient's allergies indicates:  No Known Allergies    Past Medical History:   Diagnosis Date    Cancer     skin to Rt forearm and face    COPD (chronic obstructive pulmonary disease)     Hyperlipidemia     Hypertension     Pseudoaneurysm      Past Surgical History:   Procedure Laterality Date    ABDOMINAL SURGERY      stents placed in liver and large intestines, per patient    CAROTID STENT      CORONARY STENT PLACEMENT  01/2000    DISSECTION OF NECK Bilateral 1/4/2022    Procedure: DISSECTION, NECK;  Surgeon: Naeem Smalls MD;  Location: [address];  Service: ENT;  Laterality: Bilateral;    ESOPHAGOGASTRODUODENOSCOPY W/ PEG N/A 1/4/2022    Procedure: EGD, WITH PEG TUBE INSERTION;  Surgeon: Anthony Calabrese MD;  Location: [address];  Service: General;  Laterality: N/A;    EYE SURGERY      Cataract bilateral    femoral stents      bilateral    FLAP PROCEDURE N/A 1/3/2022    Procedure: CREATION, FREE FLAP;  Surgeon: Naeem Smalls MD;  Location: [address];  Service: ENT;  Laterality: N/A;    FLAP PROCEDURE Right 1/4/2022    Procedure: CREATION, FREE FLAP;  Surgeon: Stacey Conde MD;  Location: [address];  Service: ENT;  Laterality: Right;  Ischemic start 1203  Ischemic stop 1353    GLOSSECTOMY N/A 1/4/2022    Procedure: GLOSSECTOMY;  Surgeon: Naeem Smalls MD;  Location: [address];  Service: ENT;  Laterality: N/A;    RADICAL NECK DISSECTION Left  1/3/2022    Procedure: DISSECTION, NECK, RADICAL;  Surgeon: Naeem Smalls MD;  [facility];  Service: ENT;  Laterality: Left;    SKIN CANCER EXCISION      TRACHEOTOMY N/A 2022    Procedure: TRACHEOTOMY;  Surgeon: Naeem Smalls MD;  Location: [facility] OR 2ND FLR;  Service: ENT;  Laterality: N/A;     Family History    None       Tobacco Use    Smoking status: Former     Packs/day: 2.00     Years: 40.00     Pack years: 80.00     Types: Cigarettes     Start date: 1963     Quit date: 2018     Years since quittin.4    Smokeless tobacco: Never    Tobacco comments:     3/3 ppd x 40 yrs. Currently 3-4 cigarettes daily .He is trying  to quit. Is using a Vapor cigarettes  2-3 x's a day.   Substance and Sexual Activity    Alcohol use: Not Currently     Alcohol/week: 3.0 standard drinks     Types: 3 Cans of beer per week     Comment: beer daily 3-4    Drug use: No    Sexual activity: Not Currently     Review of Systems   HENT:  Negative for congestion, dental problem, drooling, ear discharge, ear pain, mouth sores, nosebleeds, sinus pressure and sinus pain.    Respiratory:  Positive for cough and wheezing. Negative for apnea and chest tightness.    All other systems reviewed and are negative.  Objective:     Vital Signs (Most Recent):  Temp: 97.6 °F (36.4 °C) (22 1310)  Pulse: 87 (22 1530)  Resp: (!) 28 (22 1530)  BP: 99/68 (22 1529)  SpO2: (!) 94 % (22 1530)   Vital Signs (24h Range):  Temp:  [97.6 °F (36.4 °C)-98.2 °F (36.8 °C)] 97.6 °F (36.4 °C)  Pulse:  [] 87  Resp:  [18-28] 28  SpO2:  [85 %-98 %] 94 %  BP: ()/(57-90) 99/68     Weight: 61.2 kg (135 lb)  Body mass index is 20.53 kg/m².        Physical Exam  Constitutional:       General: He is not in acute distress.     Appearance: Normal appearance. He is not toxic-appearing.   HENT:      Head: Normocephalic and atraumatic.      Right Ear: External ear normal.      Left Ear: External ear  normal.      Nose: Nose normal.      Mouth/Throat:      Comments: Intraoral flap well incorporated  Thick mucus along soft palate   Floor of mouth soft   No lesions or site of active bleeding seen   Neck:      Trachea: Tracheostomy present.      Comments: Bilateral incisions well healed   Size 6 uncuffed Shiley tracheostomy tube in place, secured with sherry collar  Blood tinged secretions present in trach inner cannula   Trach stoma well healed, no peristomal oozing or sites of bleeding identified   Neurological:      Mental Status: He is alert.       Significant Labs:  CBC:   Recent Labs   Lab 09/14/22  0459   WBC 10.89   RBC 3.43*   HGB 8.4*   HCT 27.9*      MCV 81*   MCH 24.5*   MCHC 30.1*       Significant Diagnostics:  I have reviewed all pertinent imaging results/findings within the past 24 hours.        Procedure: Tracheoscopy through an existing stoma  After obtaining verbal assent, the flexible fiber-optic scope was inserted through the patient's tracheostomy tube and advanced to visualize the trachea, sherine, and take off of mainstem bronchi.  The above findings were noted.  The scope was removed, and the patient tolerated the procedure well.  There were no apparent complications.  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 Has not had any further bleeding since arrival to ED. Bedside tracheoscopy without signs of active source of bleeding, pt refused flexible laryngoscopy to evaluate for possible sources of bleeding above tracheostomy. Imaging consistent with chronic aspiration.     - No acute intervention at this time  - Follow up with Dr. Smalls as previously scheduled   - Strict return precautions given   - Ok for DC from ENT standpoint   -  Please page ENT with any questions or concerns       VTE Risk Mitigation (From admission, onward)      None            Thank you for your consult. I will follow-up with patient. Please contact us if you have any additional questions.    Miriam Ashton MD  Otorhinolaryngology-Head & Neck Surgery  [facility]

## 2022-09-14 NOTE — ED TRIAGE NOTES
Pt presents to ED via EMS with c/o blood in tracheostomy. Per wife, she changed cannula out x2 during the night due to this. Pt denies SOB other than baseline, CP. States has had productive cough for some time and currently taking oral steroids for this. Denies fever, chills.

## 2022-09-16 DIAGNOSIS — R91.8 PULMONARY INFILTRATE: Primary | ICD-10-CM

## 2022-09-16 RX ORDER — PREDNISONE 5 MG/ML
SOLUTION ORAL
Qty: 1120 ML | Refills: 0 | Status: SHIPPED | OUTPATIENT
Start: 2022-09-16 | End: 2022-09-30

## 2022-09-23 ENCOUNTER — HOSPITAL ENCOUNTER (INPATIENT)
Facility: HOSPITAL | Age: 72
LOS: 7 days | Discharge: HOME | DRG: 205 | End: 2022-09-30
Attending: EMERGENCY MEDICINE | Admitting: EMERGENCY MEDICINE
Payer: MEDICARE

## 2022-09-23 DIAGNOSIS — I50.9 CHF (CONGESTIVE HEART FAILURE): ICD-10-CM

## 2022-09-23 DIAGNOSIS — D62 ACUTE BLOOD LOSS ANEMIA: ICD-10-CM

## 2022-09-23 DIAGNOSIS — J18.9 PNEUMONIA OF BOTH LUNGS DUE TO INFECTIOUS ORGANISM, UNSPECIFIED PART OF LUNG: Primary | ICD-10-CM

## 2022-09-23 DIAGNOSIS — K92.1 GASTROINTESTINAL HEMORRHAGE WITH MELENA: ICD-10-CM

## 2022-09-23 PROBLEM — R79.89 ELEVATED BRAIN NATRIURETIC PEPTIDE (BNP) LEVEL: Status: ACTIVE | Noted: 2022-09-23

## 2022-09-23 PROBLEM — J04.10 ACUTE TRACHEITIS: Status: ACTIVE | Noted: 2022-09-23

## 2022-09-23 PROBLEM — Z93.0 TRACHEOSTOMY PRESENT: Status: ACTIVE | Noted: 2022-09-23

## 2022-09-23 PROBLEM — Z93.1 PEG (PERCUTANEOUS ENDOSCOPIC GASTROSTOMY) STATUS: Status: ACTIVE | Noted: 2022-09-23

## 2022-09-23 PROBLEM — R00.0 SINUS TACHYCARDIA: Status: RESOLVED | Noted: 2022-04-22 | Resolved: 2022-09-23

## 2022-09-23 LAB
ABO + RH BLD: NORMAL
ALBUMIN SERPL BCP-MCNC: 2.5 G/DL (ref 3.5–5.2)
ALP SERPL-CCNC: 64 U/L (ref 55–135)
ALT SERPL W/O P-5'-P-CCNC: 13 U/L (ref 10–44)
ANION GAP SERPL CALC-SCNC: 4 MMOL/L (ref 8–16)
ANISOCYTOSIS BLD QL SMEAR: SLIGHT
AST SERPL-CCNC: 13 U/L (ref 10–40)
BASOPHILS # BLD AUTO: 0.01 K/UL (ref 0–0.2)
BASOPHILS NFR BLD: 0.1 % (ref 0–1.9)
BILIRUB SERPL-MCNC: 0.1 MG/DL (ref 0.1–1)
BILIRUB UR QL STRIP: NEGATIVE
BLD GP AB SCN CELLS X3 SERPL QL: NORMAL
BLD PROD TYP BPU: NORMAL
BLD PROD TYP BPU: NORMAL
BLOOD UNIT EXPIRATION DATE: NORMAL
BLOOD UNIT EXPIRATION DATE: NORMAL
BLOOD UNIT TYPE CODE: 5100
BLOOD UNIT TYPE CODE: 5100
BLOOD UNIT TYPE: NORMAL
BLOOD UNIT TYPE: NORMAL
BNP SERPL-MCNC: 468 PG/ML (ref 0–99)
BUN SERPL-MCNC: 30 MG/DL (ref 8–23)
CALCIUM SERPL-MCNC: 9.3 MG/DL (ref 8.7–10.5)
CHLORIDE SERPL-SCNC: 97 MMOL/L (ref 95–110)
CLARITY UR REFRACT.AUTO: ABNORMAL
CO2 SERPL-SCNC: 34 MMOL/L (ref 23–29)
CODING SYSTEM: NORMAL
CODING SYSTEM: NORMAL
COLOR UR AUTO: YELLOW
CORTIS SERPL-MCNC: 5.7 UG/DL
CREAT SERPL-MCNC: 0.7 MG/DL (ref 0.5–1.4)
DIFFERENTIAL METHOD: ABNORMAL
DISPENSE STATUS: NORMAL
DISPENSE STATUS: NORMAL
EOSINOPHIL # BLD AUTO: 0.1 K/UL (ref 0–0.5)
EOSINOPHIL NFR BLD: 0.8 % (ref 0–8)
ERYTHROCYTE [DISTWIDTH] IN BLOOD BY AUTOMATED COUNT: 17.5 % (ref 11.5–14.5)
EST. GFR  (NO RACE VARIABLE): >60 ML/MIN/1.73 M^2
GLUCOSE SERPL-MCNC: 233 MG/DL (ref 70–110)
GLUCOSE UR QL STRIP: ABNORMAL
HCT VFR BLD AUTO: 19.4 % (ref 40–54)
HGB BLD-MCNC: 5.5 G/DL (ref 14–18)
HGB UR QL STRIP: NEGATIVE
HIV 1+2 AB+HIV1 P24 AG SERPL QL IA: NORMAL
HYPOCHROMIA BLD QL SMEAR: ABNORMAL
IMM GRANULOCYTES # BLD AUTO: 0.06 K/UL (ref 0–0.04)
IMM GRANULOCYTES NFR BLD AUTO: 0.8 % (ref 0–0.5)
KETONES UR QL STRIP: NEGATIVE
LEUKOCYTE ESTERASE UR QL STRIP: NEGATIVE
LYMPHOCYTES # BLD AUTO: 0.3 K/UL (ref 1–4.8)
LYMPHOCYTES NFR BLD: 3.9 % (ref 18–48)
MAGNESIUM SERPL-MCNC: 2.2 MG/DL (ref 1.6–2.6)
MCH RBC QN AUTO: 24.4 PG (ref 27–31)
MCHC RBC AUTO-ENTMCNC: 28.4 G/DL (ref 32–36)
MCV RBC AUTO: 86 FL (ref 82–98)
MONOCYTES # BLD AUTO: 0.3 K/UL (ref 0.3–1)
MONOCYTES NFR BLD: 3.7 % (ref 4–15)
NEUTROPHILS # BLD AUTO: 7 K/UL (ref 1.8–7.7)
NEUTROPHILS NFR BLD: 90.7 % (ref 38–73)
NITRITE UR QL STRIP: NEGATIVE
NRBC BLD-RTO: 0 /100 WBC
PH UR STRIP: 8 [PH] (ref 5–8)
PLATELET # BLD AUTO: 302 K/UL (ref 150–450)
PLATELET BLD QL SMEAR: ABNORMAL
PMV BLD AUTO: 9.5 FL (ref 9.2–12.9)
POIKILOCYTOSIS BLD QL SMEAR: SLIGHT
POTASSIUM SERPL-SCNC: 4.7 MMOL/L (ref 3.5–5.1)
PROT SERPL-MCNC: 6.3 G/DL (ref 6–8.4)
PROT UR QL STRIP: NEGATIVE
RBC # BLD AUTO: 2.25 M/UL (ref 4.6–6.2)
SODIUM SERPL-SCNC: 135 MMOL/L (ref 136–145)
SP GR UR STRIP: 1.01 (ref 1–1.03)
TRANS ERYTHROCYTES VOL PATIENT: NORMAL ML
TRANS ERYTHROCYTES VOL PATIENT: NORMAL ML
URN SPEC COLLECT METH UR: ABNORMAL
WBC # BLD AUTO: 7.74 K/UL (ref 3.9–12.7)

## 2022-09-23 PROCEDURE — 96361 HYDRATE IV INFUSION ADD-ON: CPT

## 2022-09-23 PROCEDURE — 87186 SC STD MICRODIL/AGAR DIL: CPT | Performed by: HOSPITALIST

## 2022-09-23 PROCEDURE — P9021 RED BLOOD CELLS UNIT: HCPCS | Mod: BL | Performed by: EMERGENCY MEDICINE

## 2022-09-23 PROCEDURE — 85025 COMPLETE CBC W/AUTO DIFF WBC: CPT | Performed by: EMERGENCY MEDICINE

## 2022-09-23 PROCEDURE — 20600001 HC STEP DOWN PRIVATE ROOM

## 2022-09-23 PROCEDURE — C9113 INJ PANTOPRAZOLE SODIUM, VIA: HCPCS | Performed by: HOSPITALIST

## 2022-09-23 PROCEDURE — C9113 INJ PANTOPRAZOLE SODIUM, VIA: HCPCS | Performed by: EMERGENCY MEDICINE

## 2022-09-23 PROCEDURE — 99900026 HC AIRWAY MAINTENANCE (STAT)

## 2022-09-23 PROCEDURE — 94761 N-INVAS EAR/PLS OXIMETRY MLT: CPT

## 2022-09-23 PROCEDURE — 99291 CRITICAL CARE FIRST HOUR: CPT | Mod: ,,, | Performed by: EMERGENCY MEDICINE

## 2022-09-23 PROCEDURE — 86850 RBC ANTIBODY SCREEN: CPT | Performed by: EMERGENCY MEDICINE

## 2022-09-23 PROCEDURE — 99291 CRITICAL CARE FIRST HOUR: CPT | Mod: ,,, | Performed by: HOSPITALIST

## 2022-09-23 PROCEDURE — 83735 ASSAY OF MAGNESIUM: CPT | Performed by: EMERGENCY MEDICINE

## 2022-09-23 PROCEDURE — 63600175 PHARM REV CODE 636 W HCPCS: Performed by: EMERGENCY MEDICINE

## 2022-09-23 PROCEDURE — 63600175 PHARM REV CODE 636 W HCPCS: Performed by: HOSPITALIST

## 2022-09-23 PROCEDURE — 87077 CULTURE AEROBIC IDENTIFY: CPT | Performed by: HOSPITALIST

## 2022-09-23 PROCEDURE — 99291 PR CRITICAL CARE, E/M 30-74 MINUTES: ICD-10-PCS | Mod: ,,, | Performed by: EMERGENCY MEDICINE

## 2022-09-23 PROCEDURE — 80053 COMPREHEN METABOLIC PANEL: CPT | Performed by: EMERGENCY MEDICINE

## 2022-09-23 PROCEDURE — 83880 ASSAY OF NATRIURETIC PEPTIDE: CPT | Performed by: EMERGENCY MEDICINE

## 2022-09-23 PROCEDURE — 87070 CULTURE OTHR SPECIMN AEROBIC: CPT | Performed by: HOSPITALIST

## 2022-09-23 PROCEDURE — 25000003 PHARM REV CODE 250: Performed by: HOSPITALIST

## 2022-09-23 PROCEDURE — 82533 TOTAL CORTISOL: CPT | Performed by: EMERGENCY MEDICINE

## 2022-09-23 PROCEDURE — 25000003 PHARM REV CODE 250: Performed by: EMERGENCY MEDICINE

## 2022-09-23 PROCEDURE — 99900035 HC TECH TIME PER 15 MIN (STAT)

## 2022-09-23 PROCEDURE — 96375 TX/PRO/DX INJ NEW DRUG ADDON: CPT

## 2022-09-23 PROCEDURE — 82962 GLUCOSE BLOOD TEST: CPT

## 2022-09-23 PROCEDURE — 94640 AIRWAY INHALATION TREATMENT: CPT

## 2022-09-23 PROCEDURE — 36430 TRANSFUSION BLD/BLD COMPNT: CPT

## 2022-09-23 PROCEDURE — 27000221 HC OXYGEN, UP TO 24 HOURS

## 2022-09-23 PROCEDURE — 86920 COMPATIBILITY TEST SPIN: CPT | Performed by: EMERGENCY MEDICINE

## 2022-09-23 PROCEDURE — 99291 CRITICAL CARE FIRST HOUR: CPT

## 2022-09-23 PROCEDURE — 87040 BLOOD CULTURE FOR BACTERIA: CPT | Performed by: EMERGENCY MEDICINE

## 2022-09-23 PROCEDURE — 31720 CLEARANCE OF AIRWAYS: CPT

## 2022-09-23 PROCEDURE — 99291 PR CRITICAL CARE, E/M 30-74 MINUTES: ICD-10-PCS | Mod: ,,, | Performed by: HOSPITALIST

## 2022-09-23 PROCEDURE — 87205 SMEAR GRAM STAIN: CPT | Performed by: HOSPITALIST

## 2022-09-23 PROCEDURE — P9021 RED BLOOD CELLS UNIT: HCPCS | Performed by: EMERGENCY MEDICINE

## 2022-09-23 PROCEDURE — 87389 HIV-1 AG W/HIV-1&-2 AB AG IA: CPT | Performed by: PHYSICIAN ASSISTANT

## 2022-09-23 PROCEDURE — 81003 URINALYSIS AUTO W/O SCOPE: CPT | Performed by: EMERGENCY MEDICINE

## 2022-09-23 PROCEDURE — 96365 THER/PROPH/DIAG IV INF INIT: CPT

## 2022-09-23 PROCEDURE — 96366 THER/PROPH/DIAG IV INF ADDON: CPT

## 2022-09-23 PROCEDURE — 25000242 PHARM REV CODE 250 ALT 637 W/ HCPCS: Performed by: HOSPITALIST

## 2022-09-23 RX ORDER — TOBRAMYCIN INHALATION SOLUTION 300 MG/5ML
300 INHALANT RESPIRATORY (INHALATION) 2 TIMES DAILY
Status: DISCONTINUED | OUTPATIENT
Start: 2022-09-23 | End: 2022-09-30 | Stop reason: HOSPADM

## 2022-09-23 RX ORDER — PREDNISONE 5 MG/ML
10 SOLUTION ORAL DAILY
Status: DISCONTINUED | OUTPATIENT
Start: 2022-09-23 | End: 2022-09-23

## 2022-09-23 RX ORDER — ATORVASTATIN CALCIUM 20 MG/1
20 TABLET, FILM COATED ORAL DAILY
Status: DISCONTINUED | OUTPATIENT
Start: 2022-09-23 | End: 2022-09-30 | Stop reason: HOSPADM

## 2022-09-23 RX ORDER — PANTOPRAZOLE SODIUM 40 MG/10ML
40 INJECTION, POWDER, LYOPHILIZED, FOR SOLUTION INTRAVENOUS 2 TIMES DAILY
Status: DISCONTINUED | OUTPATIENT
Start: 2022-09-23 | End: 2022-09-30 | Stop reason: HOSPADM

## 2022-09-23 RX ORDER — PREDNISONE 5 MG/ML
20 SOLUTION ORAL DAILY
Status: DISPENSED | OUTPATIENT
Start: 2022-09-23 | End: 2022-09-30

## 2022-09-23 RX ORDER — HYDROCODONE BITARTRATE AND ACETAMINOPHEN 500; 5 MG/1; MG/1
TABLET ORAL
Status: DISCONTINUED | OUTPATIENT
Start: 2022-09-23 | End: 2022-09-30 | Stop reason: HOSPADM

## 2022-09-23 RX ORDER — ACETAMINOPHEN 500 MG
1000 TABLET ORAL EVERY 8 HOURS PRN
Status: DISCONTINUED | OUTPATIENT
Start: 2022-09-23 | End: 2022-09-30 | Stop reason: HOSPADM

## 2022-09-23 RX ORDER — SODIUM CHLORIDE 0.9 % (FLUSH) 0.9 %
10 SYRINGE (ML) INJECTION
Status: CANCELLED | OUTPATIENT
Start: 2022-09-23

## 2022-09-23 RX ORDER — VANCOMYCIN HCL IN 5 % DEXTROSE 1G/250ML
1000 PLASTIC BAG, INJECTION (ML) INTRAVENOUS
Status: COMPLETED | OUTPATIENT
Start: 2022-09-23 | End: 2022-09-23

## 2022-09-23 RX ORDER — TALC
6 POWDER (GRAM) TOPICAL NIGHTLY PRN
Status: DISCONTINUED | OUTPATIENT
Start: 2022-09-23 | End: 2022-09-30 | Stop reason: HOSPADM

## 2022-09-23 RX ORDER — ONDANSETRON 2 MG/ML
8 INJECTION INTRAMUSCULAR; INTRAVENOUS EVERY 6 HOURS PRN
Status: DISCONTINUED | OUTPATIENT
Start: 2022-09-23 | End: 2022-09-30 | Stop reason: HOSPADM

## 2022-09-23 RX ORDER — ACETAMINOPHEN 325 MG/1
650 TABLET ORAL EVERY 4 HOURS PRN
Status: DISCONTINUED | OUTPATIENT
Start: 2022-09-23 | End: 2022-09-30 | Stop reason: HOSPADM

## 2022-09-23 RX ORDER — POLYETHYLENE GLYCOL 3350 17 G/17G
17 POWDER, FOR SOLUTION ORAL DAILY PRN
Status: DISCONTINUED | OUTPATIENT
Start: 2022-09-23 | End: 2022-09-30 | Stop reason: HOSPADM

## 2022-09-23 RX ORDER — TALC
6 POWDER (GRAM) TOPICAL NIGHTLY PRN
Status: CANCELLED | OUTPATIENT
Start: 2022-09-23

## 2022-09-23 RX ORDER — SODIUM CHLORIDE 0.9 % (FLUSH) 0.9 %
5 SYRINGE (ML) INJECTION
Status: DISCONTINUED | OUTPATIENT
Start: 2022-09-23 | End: 2022-09-30 | Stop reason: HOSPADM

## 2022-09-23 RX ORDER — DEXAMETHASONE SODIUM PHOSPHATE 4 MG/ML
10 INJECTION, SOLUTION INTRA-ARTICULAR; INTRALESIONAL; INTRAMUSCULAR; INTRAVENOUS; SOFT TISSUE
Status: COMPLETED | OUTPATIENT
Start: 2022-09-23 | End: 2022-09-23

## 2022-09-23 RX ORDER — PANTOPRAZOLE SODIUM 40 MG/10ML
80 INJECTION, POWDER, LYOPHILIZED, FOR SOLUTION INTRAVENOUS
Status: COMPLETED | OUTPATIENT
Start: 2022-09-23 | End: 2022-09-23

## 2022-09-23 RX ADMIN — VANCOMYCIN HYDROCHLORIDE 1000 MG: 1 INJECTION, POWDER, LYOPHILIZED, FOR SOLUTION INTRAVENOUS at 03:09

## 2022-09-23 RX ADMIN — PANTOPRAZOLE SODIUM 40 MG: 40 INJECTION, POWDER, FOR SOLUTION INTRAVENOUS at 08:09

## 2022-09-23 RX ADMIN — PIPERACILLIN SODIUM AND TAZOBACTAM SODIUM 4.5 G: 4; .5 INJECTION, POWDER, LYOPHILIZED, FOR SOLUTION INTRAVENOUS at 02:09

## 2022-09-23 RX ADMIN — PIPERACILLIN SODIUM AND TAZOBACTAM SODIUM 4.5 G: 4; .5 INJECTION, POWDER, LYOPHILIZED, FOR SOLUTION INTRAVENOUS at 08:09

## 2022-09-23 RX ADMIN — SODIUM CHLORIDE: 0.9 INJECTION, SOLUTION INTRAVENOUS at 06:09

## 2022-09-23 RX ADMIN — TOBRAMYCIN 300 MG: 300 SOLUTION RESPIRATORY (INHALATION) at 08:09

## 2022-09-23 RX ADMIN — PANTOPRAZOLE SODIUM 80 MG: 40 INJECTION, POWDER, FOR SOLUTION INTRAVENOUS at 01:09

## 2022-09-23 RX ADMIN — DEXAMETHASONE SODIUM PHOSPHATE 10 MG: 4 INJECTION INTRA-ARTICULAR; INTRALESIONAL; INTRAMUSCULAR; INTRAVENOUS; SOFT TISSUE at 01:09

## 2022-09-23 RX ADMIN — ATORVASTATIN CALCIUM 20 MG: 20 TABLET, FILM COATED ORAL at 03:09

## 2022-09-23 NOTE — SUBJECTIVE & OBJECTIVE
Past Medical History:   Diagnosis Date    Cancer     skin to Rt forearm and face    COPD (chronic obstructive pulmonary disease)     Hyperlipidemia     Hypertension     Pseudoaneurysm        Past Surgical History:   Procedure Laterality Date    ABDOMINAL SURGERY      stents placed in liver and large intestines, per patient    CAROTID STENT      CORONARY STENT PLACEMENT  01/2000    DISSECTION OF NECK Bilateral 1/4/2022    Procedure: DISSECTION, NECK;  Surgeon: Naeem Smalls MD;  Location: [address];  Service: ENT;  Laterality: Bilateral;    ESOPHAGOGASTRODUODENOSCOPY W/ PEG N/A 1/4/2022    Procedure: EGD, WITH PEG TUBE INSERTION;  Surgeon: Anthony Calabrese MD;  Location: [address];  Service: General;  Laterality: N/A;    EYE SURGERY      Cataract bilateral    femoral stents      bilateral    FLAP PROCEDURE N/A 1/3/2022    Procedure: CREATION, FREE FLAP;  Surgeon: Naeem Smalls MD;  Location: [address];  Service: ENT;  Laterality: N/A;    FLAP PROCEDURE Right 1/4/2022    Procedure: CREATION, FREE FLAP;  Surgeon: Stacey Conde MD;  Location: [address];  Service: ENT;  Laterality: Right;  Ischemic start 1203  Ischemic stop 1353    GLOSSECTOMY N/A 1/4/2022    Procedure: GLOSSECTOMY;  Surgeon: Naeem Smalls MD;  Location: [facility] OR [address] [place] [address];  Service: ENT;  Laterality: N/A;    RADICAL NECK DISSECTION Left 1/3/2022    Procedure: DISSECTION, NECK, RADICAL;  Surgeon: Naeem Smalls MD;  Location: [facility] OR [address] [place] [address];  Service: ENT;  Laterality: Left;    SKIN CANCER EXCISION      TRACHEOTOMY N/A 1/4/2022    Procedure: TRACHEOTOMY;  Surgeon: Naeem Smalls MD;  Location: [facility] OR [address] [place] [address];  Service: ENT;  Laterality: N/A;       Review of patient's allergies indicates:  No Known Allergies    No current facility-administered medications on file prior to encounter.     Current Outpatient Medications on File Prior to Encounter   Medication Sig    amoxicillin-clavulanate 875-125mg  (AUGMENTIN) 875-125 mg per tablet Take 1 tablet by mouth 2 (two) times daily. for 10 days    aspirin 81 MG Chew 1 tablet (81 mg total) by Per G Tube route once daily.    atorvastatin (LIPITOR) 20 MG tablet 1 tablet (20 mg total) by Per G Tube route once daily.    bisacodyL (DULCOLAX) 10 mg Supp Place 1 suppository (10 mg total) rectally daily as needed.    clopidogreL (PLAVIX) 75 mg tablet 1 tablet (75 mg total) by Per G Tube route once daily.    glycopyrrolate (CUVPOSA) 1 mg/5 mL (0.2 mg/mL) Soln Take 5 mLs (1 mg total) by mouth 3 (three) times daily as needed (TO REDUCE SECRETIONS).    guaiFENesin 200 mg/5 mL Liqd Take 400 mg by mouth every 4 (four) hours as needed (for secretions).    hydrOXYzine HCL (ATARAX) 25 MG tablet SMARTSI.5 Tablet(s) Gastro Tube Every 8 Hours PRN    melatonin (MELATIN) 3 mg tablet 2 tablets (6 mg total) by Per G Tube route nightly.    metoprolol succinate (TOPROL-XL) 200 MG 24 hr tablet Take 200 mg by mouth 2 (two) times daily.    ondansetron (ZOFRAN-ODT) 8 MG TbDL Take 1 tablet (8 mg total) by mouth every 8 (eight) hours as needed (nausea). (Patient not taking: Reported on 2022)    oxyCODONE (ROXICODONE) 5 mg/5 mL Soln 5 mLs (5 mg total) by Per G Tube route every 4 (four) hours as needed (Pain).    polyethylene glycol (GLYCOLAX) 17 gram PwPk 17 g by Per G Tube route 2 (two) times daily.    prednisone 5 mg/5 mL Soln Take 20 mLs (20 mg total) by mouth once daily for 7 days, THEN 10 mLs (10 mg total) once daily for 7 days.    sodium chloride (OCEAN) 0.65 % nasal spray 1 spray by Nasal route as needed for Congestion.    WIXELA INHUB 250-50 mcg/dose diskus inhaler SMARTSI Puff(s) By Mouth Every 12 Hours    [DISCONTINUED] losartan (COZAAR) 50 MG tablet 1 tablet (50 mg total) by Per G Tube route once daily.    [DISCONTINUED] metoprolol tartrate (LOPRESSOR) 100 MG tablet 1 tablet (100 mg total) by Per G Tube route 2 (two) times daily.     Family History    None       Tobacco Use     Smoking status: Former     Packs/day: 2.00     Years: 40.00     Pack years: 80.00     Types: Cigarettes     Start date: 1963     Quit date: 2018     Years since quittin.4    Smokeless tobacco: Never    Tobacco comments:     3/3 ppd x 40 yrs. Currently 3-4 cigarettes daily .He is trying  to quit. Is using a Vapor cigarettes  2-3 x's a day.   Substance and Sexual Activity    Alcohol use: Not Currently     Alcohol/week: 3.0 standard drinks     Types: 3 Cans of beer per week     Comment: beer daily 3-4    Drug use: No    Sexual activity: Not Currently     Review of Systems   Constitutional:  Positive for activity change. Negative for chills, fatigue and fever.   HENT:  Negative for sore throat and trouble swallowing.    Eyes:  Negative for photophobia and visual disturbance.   Respiratory:  Positive for cough and shortness of breath.    Cardiovascular:  Negative for chest pain, palpitations and leg swelling.   Gastrointestinal:  Negative for abdominal pain, constipation, diarrhea, nausea and vomiting.   Endocrine: Negative for cold intolerance and heat intolerance.   Genitourinary:  Negative for dysuria and frequency.   Musculoskeletal:  Negative for arthralgias and myalgias.   Skin:  Negative for rash and wound.   Neurological:  Negative for dizziness, syncope, weakness and light-headedness.   Psychiatric/Behavioral:  Negative for confusion and hallucinations.    All other systems reviewed and are negative.  Objective:     Vital Signs (Most Recent):  Temp: 98.2 °F (36.8 °C) (22 1041)  Pulse: 75 (22 1519)  Resp: 18 (22 1519)  BP: 108/64 (22 1519)  SpO2: 100 % (22 1519) Vital Signs (24h Range):  Temp:  [98.2 °F (36.8 °C)] 98.2 °F (36.8 °C)  Pulse:  [74-87] 75  Resp:  [16-20] 18  SpO2:  [94 %-100 %] 100 %  BP: (103-132)/(58-78) 108/64     Weight: 61.2 kg (135 lb)  Body mass index is 20.53 kg/m².    Physical Exam  Vitals reviewed.   Constitutional:       Appearance: Normal  appearance. He is well-developed.   HENT:      Head: Normocephalic and atraumatic.   Eyes:      General: No scleral icterus.     Pupils: Pupils are equal, round, and reactive to light.   Neck:      Comments: Trach with yellow secretions  Cardiovascular:      Rate and Rhythm: Normal rate and regular rhythm.      Heart sounds: No murmur heard.  Pulmonary:      Effort: Pulmonary effort is normal. No respiratory distress.      Breath sounds: Rhonchi and rales present. No wheezing.   Abdominal:      General: Bowel sounds are normal. There is no distension.      Palpations: Abdomen is soft.      Tenderness: There is no abdominal tenderness.      Comments: Peg   Musculoskeletal:         General: Normal range of motion.      Cervical back: Normal range of motion and neck supple.   Skin:     General: Skin is warm and dry.      Coloration: Skin is pale.   Neurological:      General: No focal deficit present.      Mental Status: He is alert and oriented to person, place, and time. Mental status is at baseline.   Psychiatric:         Behavior: Behavior normal.         CRANIAL NERVES     CN III, IV, VI   Pupils are equal, round, and reactive to light.     Significant Labs: All pertinent labs within the past 24 hours have been reviewed.  CBC:   Recent Labs   Lab 09/23/22  1247   WBC 7.74   HGB 5.5*   HCT 19.4*        CMP:   Recent Labs   Lab 09/23/22  1247   *   K 4.7   CL 97   CO2 34*   *   BUN 30*   CREATININE 0.7   CALCIUM 9.3   PROT 6.3   ALBUMIN 2.5*   BILITOT 0.1   ALKPHOS 64   AST 13   ALT 13   ANIONGAP 4*       Significant Imaging: I have reviewed all pertinent imaging results/findings within the past 24 hours.

## 2022-09-23 NOTE — ASSESSMENT & PLAN NOTE
· Chronic and stable  · Hold Aspirin and Plavix with GIB  · Continue Statin  · Hold Metoprolol with softer BP

## 2022-09-23 NOTE — H&P
[facility] - Emergency Dept  [facility] Medicine  History & Physical    Patient Name: Fareed Richard Jr.  MRN: [id number]  Patient Class: IP- Inpatient  Admission Date: 9/23/2022  Attending Physician: Brenda Saldivar MD   Primary Care Provider: Kodi Tubbs MD         Patient information was obtained from patient, spouse/SO and ER records.     Subjective:     Principal Problem:Acute on chronic respiratory failure    Chief Complaint:   Chief Complaint   Patient presents with    Shortness of Breath     On 5L O2, told EMS he couldn't breath. Got suctioned by EMS and now 100% on 5L. Pt. Coming from home.         HPI: Mr. Fareed Richard Jr. is a 72 y.o. 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  History is obtained from wife at bedside.  She reports that at baseline, he is on 5L trach collar.  However, for the last 1-2 weeks he has been having worsening respiratory issues.  He is currently on a prolonged Prednisone taper.  He went to the [facility] ER on 9/14 for hemoptysis and bloody trach secretions.  Hemoglobin was 8.4.  At that encounter, he was evaluated by ENT who felt like his airway was patent.  His hemoptysis stopped before leaving the ER.  He was discharged with Augmentin for aspiration pneumonia seen on CT scan.  However, on the day of admission he complained of chest pain, shortness of breath, and worsening fatigue.  Wife also endorsed yellow/green and foul colored trach secretions, but no fever or chills.  She suctioned him, which did not help - so she had to increase his home oxygen from 5L to 10L.  EMS was called and suctioned him, getting his oxygen saturations to 100% on 5L NC.  He denies any dark stools to his knowledge, but is on Aspirin and Plavix.  Chart review showed multiple respiratory cultures with Pseudomonas, unclear if  colonization.    Upon arrival to the ER, vitals were temp 98.2F, HR 87, /78 satting 100% on 10L trach collar, 60% FiO2.  Labs showed Hemoglobin 5.5 and .  CXR showed pulmonary vascular and interstitial markings with effusions, no clear consolidation.  HAYLEE was positive for melena.  He was given 1L NS, Vanc, Zosyn.  2 units PRBCs were ordered and he was given IV PPI.  He was admitted to Hospital Medicine for further management.      Past Medical History:   Diagnosis Date    Cancer     skin to Rt forearm and face    COPD (chronic obstructive pulmonary disease)     Hyperlipidemia     Hypertension     Pseudoaneurysm        Past Surgical History:   Procedure Laterality Date    ABDOMINAL SURGERY      stents placed in liver and large intestines, per patient    CAROTID STENT      CORONARY STENT PLACEMENT  01/2000    DISSECTION OF NECK Bilateral 1/4/2022    Procedure: DISSECTION, NECK;  Surgeon: Naeem Smalls MD;  Location: [address];  Service: ENT;  Laterality: Bilateral;    ESOPHAGOGASTRODUODENOSCOPY W/ PEG N/A 1/4/2022    Procedure: EGD, WITH PEG TUBE INSERTION;  Surgeon: Anthony Calabrese MD;  Location: [address];  Service: General;  Laterality: N/A;    EYE SURGERY      Cataract bilateral    femoral stents      bilateral    FLAP PROCEDURE N/A 1/3/2022    Procedure: CREATION, FREE FLAP;  Surgeon: Naeem Smalls MD;  Location: [address];  Service: ENT;  Laterality: N/A;    FLAP PROCEDURE Right 1/4/2022    Procedure: CREATION, FREE FLAP;  Surgeon: Stacey Conde MD;  Location: [address];  Service: ENT;  Laterality: Right;  Ischemic start 1203  Ischemic stop 1353    GLOSSECTOMY N/A 1/4/2022    Procedure: GLOSSECTOMY;  Surgeon: Naeem Smalls MD;  Location: [facility] OR [address] [place] [address];  Service: ENT;  Laterality: N/A;    RADICAL NECK DISSECTION Left 1/3/2022    Procedure: DISSECTION, NECK, RADICAL;  Surgeon: Naeem Smalls MD;  Location: [address];   Service: ENT;  Laterality: Left;    SKIN CANCER EXCISION      TRACHEOTOMY N/A 2022    Procedure: TRACHEOTOMY;  Surgeon: Naeem Smalls MD;  Location: [facility] OR [address] [place] [address];  Service: ENT;  Laterality: N/A;       Review of patient's allergies indicates:  No Known Allergies    No current facility-administered medications on file prior to encounter.     Current Outpatient Medications on File Prior to Encounter   Medication Sig    amoxicillin-clavulanate 875-125mg (AUGMENTIN) 875-125 mg per tablet Take 1 tablet by mouth 2 (two) times daily. for 10 days    aspirin 81 MG Chew 1 tablet (81 mg total) by Per G Tube route once daily.    atorvastatin (LIPITOR) 20 MG tablet 1 tablet (20 mg total) by Per G Tube route once daily.    bisacodyL (DULCOLAX) 10 mg Supp Place 1 suppository (10 mg total) rectally daily as needed.    clopidogreL (PLAVIX) 75 mg tablet 1 tablet (75 mg total) by Per G Tube route once daily.    glycopyrrolate (CUVPOSA) 1 mg/5 mL (0.2 mg/mL) Soln Take 5 mLs (1 mg total) by mouth 3 (three) times daily as needed (TO REDUCE SECRETIONS).    guaiFENesin 200 mg/5 mL Liqd Take 400 mg by mouth every 4 (four) hours as needed (for secretions).    hydrOXYzine HCL (ATARAX) 25 MG tablet SMARTSI.5 Tablet(s) Gastro Tube Every 8 Hours PRN    melatonin (MELATIN) 3 mg tablet 2 tablets (6 mg total) by Per G Tube route nightly.    metoprolol succinate (TOPROL-XL) 200 MG 24 hr tablet Take 200 mg by mouth 2 (two) times daily.    ondansetron (ZOFRAN-ODT) 8 MG TbDL Take 1 tablet (8 mg total) by mouth every 8 (eight) hours as needed (nausea). (Patient not taking: Reported on 2022)    oxyCODONE (ROXICODONE) 5 mg/5 mL Soln 5 mLs (5 mg total) by Per G Tube route every 4 (four) hours as needed (Pain).    polyethylene glycol (GLYCOLAX) 17 gram PwPk 17 g by Per G Tube route 2 (two) times daily.    prednisone 5 mg/5 mL Soln Take 20 mLs (20 mg total) by mouth once daily for 7 days, THEN 10 mLs (10 mg total) once  daily for 7 days.    sodium chloride (OCEAN) 0.65 % nasal spray 1 spray by Nasal route as needed for Congestion.    WIXELA INHUB 250-50 mcg/dose diskus inhaler SMARTSI Puff(s) By Mouth Every 12 Hours    [DISCONTINUED] losartan (COZAAR) 50 MG tablet 1 tablet (50 mg total) by Per G Tube route once daily.    [DISCONTINUED] metoprolol tartrate (LOPRESSOR) 100 MG tablet 1 tablet (100 mg total) by Per G Tube route 2 (two) times daily.     Family History    None       Tobacco Use    Smoking status: Former     Packs/day: 2.00     Years: 40.00     Pack years: 80.00     Types: Cigarettes     Start date: 1963     Quit date: 2018     Years since quittin.4    Smokeless tobacco: Never    Tobacco comments:     3/3 ppd x 40 yrs. Currently 3-4 cigarettes daily .He is trying  to quit. Is using a Vapor cigarettes  2-3 x's a day.   Substance and Sexual Activity    Alcohol use: Not Currently     Alcohol/week: 3.0 standard drinks     Types: 3 Cans of beer per week     Comment: beer daily 3-4    Drug use: No    Sexual activity: Not Currently     Review of Systems   Constitutional:  Positive for activity change. Negative for chills, fatigue and fever.   HENT:  Negative for sore throat and trouble swallowing.    Eyes:  Negative for photophobia and visual disturbance.   Respiratory:  Positive for cough and shortness of breath.    Cardiovascular:  Negative for chest pain, palpitations and leg swelling.   Gastrointestinal:  Negative for abdominal pain, constipation, diarrhea, nausea and vomiting.   Endocrine: Negative for cold intolerance and heat intolerance.   Genitourinary:  Negative for dysuria and frequency.   Musculoskeletal:  Negative for arthralgias and myalgias.   Skin:  Negative for rash and wound.   Neurological:  Negative for dizziness, syncope, weakness and light-headedness.   Psychiatric/Behavioral:  Negative for confusion and hallucinations.    All other systems reviewed and are negative.  Objective:      Vital Signs (Most Recent):  Temp: 98.2 °F (36.8 °C) (09/23/22 1041)  Pulse: 75 (09/23/22 1519)  Resp: 18 (09/23/22 1519)  BP: 108/64 (09/23/22 1519)  SpO2: 100 % (09/23/22 1519) Vital Signs (24h Range):  Temp:  [98.2 °F (36.8 °C)] 98.2 °F (36.8 °C)  Pulse:  [74-87] 75  Resp:  [16-20] 18  SpO2:  [94 %-100 %] 100 %  BP: (103-132)/(58-78) 108/64     Weight: 61.2 kg (135 lb)  Body mass index is 20.53 kg/m².    Physical Exam  Vitals reviewed.   Constitutional:       Appearance: Normal appearance. He is well-developed.   HENT:      Head: Normocephalic and atraumatic.   Eyes:      General: No scleral icterus.     Pupils: Pupils are equal, round, and reactive to light.   Neck:      Comments: Trach with yellow secretions  Cardiovascular:      Rate and Rhythm: Normal rate and regular rhythm.      Heart sounds: No murmur heard.  Pulmonary:      Effort: Pulmonary effort is normal. No respiratory distress.      Breath sounds: Rhonchi and rales present. No wheezing.   Abdominal:      General: Bowel sounds are normal. There is no distension.      Palpations: Abdomen is soft.      Tenderness: There is no abdominal tenderness.      Comments: Peg   Musculoskeletal:         General: Normal range of motion.      Cervical back: Normal range of motion and neck supple.   Skin:     General: Skin is warm and dry.      Coloration: Skin is pale.   Neurological:      General: No focal deficit present.      Mental Status: He is alert and oriented to person, place, and time. Mental status is at baseline.   Psychiatric:         Behavior: Behavior normal.         CRANIAL NERVES     CN III, IV, VI   Pupils are equal, round, and reactive to light.     Significant Labs: All pertinent labs within the past 24 hours have been reviewed.  CBC:   Recent Labs   Lab 09/23/22  1247   WBC 7.74   HGB 5.5*   HCT 19.4*        CMP:   Recent Labs   Lab 09/23/22  1247   *   K 4.7   CL 97   CO2 34*   *   BUN 30*   CREATININE 0.7   CALCIUM 9.3    PROT 6.3   ALBUMIN 2.5*   BILITOT 0.1   ALKPHOS 64   AST 13   ALT 13   ANIONGAP 4*       Significant Imaging: I have reviewed all pertinent imaging results/findings within the past 24 hours.    Assessment/Plan:     * Acute on chronic respiratory failure  · On 5L trach collar at home, but now requiring 10L trach collar  · Likely 2/2 tracheitis and pneumonia  · Continue Prednisone taper from outpatient - 20mg daily x 7 days, then 10mg x  7 days  · Management as below    Pneumonia  · As above      Acute tracheitis  · Recently with hemoptysis and now yellow/green and foul smelling secretions from his trach and rhonchi - recent CT chest 9/14 with pneumonia  · Trach culture pending.  Previous resp cx with Pseudomonas, unclear if colonization  · Continue Vanc and Zosyn for now, start date 9/23  · Start Tobra nebs  · ID consult  · Can consider ENT eval      Melena  · GIB Pathway initiated  · Hgb 5.5 on admit, baseline around 8 as of 2 weeks ago  · S/p Protonix 80mg IV x 1 in the ED.  Protonix 40mg IV BID  · GI consulted, appreciate assistance  · NPO at midnight for possible EGD  · Type and screen, blood consent obtained  · Transfuse for Hemoglobin <7 - ER ordered 2 units PRBCs  · Coagulopathy goals:  Plt >50, INR <1.5        Tracheostomy present  · On 5L trach collar at home      Acute blood loss anemia  · See Melena      Elevated brain natriuretic peptide (BNP) level  · BNP newly elevated at 468  · Last 2D echo in April with normal EF   · Repeat 2D echo      PEG (percutaneous endoscopic gastrostomy) status  · See Dysphagia      Dysphagia  · S/p peg  · On TF - Hold for now until GI eval      Other hyperlipidemia  · Chronic and stable  · Continue Statin      Primary hypertension  · Chronic issue  · Hold Metoprolol with GIB and softer BP      Coronary artery disease involving native coronary artery of native heart without angina pectoris  · Chronic and stable  · Hold Aspirin and Plavix with GIB  · Continue Statin  · Hold  Metoprolol with softer BP      Squamous cell cancer of tongue  · S/p chemo, radiation  · Now with flap, trach, and peg    Peripheral vascular disease  · Chronic issue  · Hold Aspirin and Plavix with GIB  · Continue Statin        VTE Risk Mitigation (From admission, onward)         Ordered     IP VTE HIGH RISK PATIENT  Once         09/23/22 1409     Place sequential compression device  Until discontinued         09/23/22 1408               Critical Care Time: 60 minutes    Critical Care was time spent personally by me on the following activities: evaluating this patient's organ dysfunction, development of treatment plan, discussing treatment plan with patient or surrogate and bedside caregivers, discussions with consultants, evaluation of patient's response to treatment, examination of patient, ordering and performing treatments and interventions, ordering and review of laboratory studies, ordering and review of radiographic studies, re-evaluation of patient's condition. This critical care time did not overlap with that of any other provider or involve time for any separately billed procedures    Diagnosis requiring critical care: Anemia requiring transfusions      Brenda Saldivar MD  Department of Hospital Medicine   [facility] - Emergency Dept

## 2022-09-23 NOTE — ASSESSMENT & PLAN NOTE
· On 5L trach collar at home, but now requiring 10L trach collar  · Likely 2/2 tracheitis and pneumonia  · Continue Prednisone taper from outpatient - 20mg daily x 7 days, then 10mg x  7 days  · Management as below

## 2022-09-23 NOTE — ASSESSMENT & PLAN NOTE
· Recently with hemoptysis and now yellow/green and foul smelling secretions from his trach and rhonchi - recent CT chest 9/14 with pneumonia  · Trach culture pending.  Previous resp cx with Pseudomonas, unclear if colonization  · Continue Vanc and Zosyn for now, start date 9/23  · Start Tobra nebs  · ID consult  · Can consider ENT eval

## 2022-09-23 NOTE — ASSESSMENT & PLAN NOTE
· GIB Pathway initiated  · Hgb 5.5 on admit, baseline around 8 as of 2 weeks ago  · S/p Protonix 80mg IV x 1 in the ED.  Protonix 40mg IV BID  · GI consulted, appreciate assistance  · NPO at midnight for possible EGD  · Type and screen, blood consent obtained  · Transfuse for Hemoglobin <7 - ER ordered 2 units PRBCs  · Coagulopathy goals:  Plt >50, INR <1.5

## 2022-09-23 NOTE — ED PROVIDER NOTES
Encounter Date: 9/23/2022       History     Chief Complaint   Patient presents with    Shortness of Breath     On 5L O2, told EMS he couldn't breath. Got suctioned by EMS and now 100% on 5L. Pt. Coming from home.      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  Patient is brought in by EMS secondary to worsening shortness of breath at home and generalized weakness.  Patient told his wife that she needed to call the ambulance but did not provide much more history than that.  Patient currently taking Augmentin at home for aspiration pneumonia and is also on a steroid taper right now, currently at prednisone 20 mg a day.  They are concerned because their daughter's family has RSV.  Patient with no fevers, no vomiting or diarrhea, no URI symptoms.  They report that patient's wife suction the patient and that did not improve his symptoms.    Patient was recently in the ED for worsening hemoptysis.  Wife reports that his hemoptysis has largely resolved since that visit.    The history is provided by the patient and the spouse. Dictation #1  MRN:[id number]  CSN:[id number]   Review of patient's allergies indicates:  No Known Allergies  Past Medical History:   Diagnosis Date    Cancer     skin to Rt forearm and face    COPD (chronic obstructive pulmonary disease)     Hyperlipidemia     Hypertension     Pseudoaneurysm      Past Surgical History:   Procedure Laterality Date    ABDOMINAL SURGERY      stents placed in liver and large intestines, per patient    CAROTID STENT      CORONARY STENT PLACEMENT  01/2000    DISSECTION OF NECK Bilateral 1/4/2022    Procedure: DISSECTION, NECK;  Surgeon: Naeem Smalls MD;  Location: [facility] OR [address] [place] [address];  Service: ENT;  Laterality: Bilateral;    ESOPHAGOGASTRODUODENOSCOPY W/ PEG N/A 1/4/2022    Procedure: EGD, WITH PEG TUBE INSERTION;  Surgeon: Anthony LUBIN  MD [place];  [address];  Service: General;  Laterality: N/A;    EYE SURGERY      Cataract bilateral    femoral stents      bilateral    FLAP PROCEDURE N/A 1/3/2022    Procedure: CREATION, FREE FLAP;  Surgeon: Naeem Smalls MD;  [facility];  Service: ENT;  Laterality: N/A;    FLAP PROCEDURE Right 2022    Procedure: CREATION, FREE FLAP;  Surgeon: Stacey Conde MD;  [facility];  Service: ENT;  Laterality: Right;  Ischemic start 1203  Ischemic stop 1353    GLOSSECTOMY N/A 2022    Procedure: GLOSSECTOMY;  Surgeon: Naeem Smalls MD;  [facility];  Service: ENT;  Laterality: N/A;    RADICAL NECK DISSECTION Left 1/3/2022    Procedure: DISSECTION, NECK, RADICAL;  Surgeon: Naeem Smalls MD;  Location: [facility] OR [address] [place] [address];  Service: ENT;  Laterality: Left;    SKIN CANCER EXCISION      TRACHEOTOMY N/A 2022    Procedure: TRACHEOTOMY;  Surgeon: Naeem Smalls MD;  Location: [facility] OR [address] [place] [address];  Service: ENT;  Laterality: N/A;     No family history on file.  Social History     Tobacco Use    Smoking status: Former     Packs/day: 2.00     Years: 40.00     Pack years: 80.00     Types: Cigarettes     Start date: 1963     Quit date: 2018     Years since quittin.4    Smokeless tobacco: Never    Tobacco comments:     3/3 ppd x 40 yrs. Currently 3-4 cigarettes daily .He is trying  to quit. Is using a Vapor cigarettes  2-3 x's a day.   Substance Use Topics    Alcohol use: Not Currently     Alcohol/week: 3.0 standard drinks     Types: 3 Cans of beer per week     Comment: beer daily 3-4    Drug use: No     Review of Systems   Unable to perform ROS: Patient nonverbal   Constitutional:  Positive for fatigue. Negative for chills, diaphoresis and fever.   Respiratory:  Positive for shortness of breath.    Cardiovascular:  Negative for chest pain.     Physical Exam     Initial Vitals [22 1041]   BP Pulse Resp Temp SpO2   132/78 87 20  98.2 °F (36.8 °C) 100 %      MAP       --         Physical Exam    Nursing note and vitals reviewed.  Constitutional:   Chronically ill-appearing   Eyes: Conjunctivae are normal.   Neck:   Trach in place   Cardiovascular:  Normal rate.           Pulmonary/Chest: No respiratory distress. He has no wheezes.   Abdominal: Abdomen is soft.   PEG tube   Genitourinary: Rectum:      Guaiac result positive.   Guaiac positive stool. : Acceptable.   Genitourinary Comments: Black stool, strongly positive     Musculoskeletal:         General: No edema.     Neurological: He is alert.   Able to nod appropriately to questions but difficulty speaking with his trach  Follows commands, no focal weakness   Skin: Skin is warm and dry. No pallor.       ED Course   Procedures  Labs Reviewed   CBC W/ AUTO DIFFERENTIAL - Abnormal; Notable for the following components:       Result Value    RBC 2.25 (*)     Hemoglobin 5.5 (*)     Hematocrit 19.4 (*)     MCH 24.4 (*)     MCHC 28.4 (*)     RDW 17.5 (*)     Immature Granulocytes 0.8 (*)     Immature Grans (Abs) 0.06 (*)     Lymph # 0.3 (*)     Gran % 90.7 (*)     Lymph % 3.9 (*)     Mono % 3.7 (*)     All other components within normal limits    Narrative:     hct and hgb   critical result(s) called and verbal readback obtained   from makenzie jack rn by GIANCARLO 09/23/2022 13:36   COMPREHENSIVE METABOLIC PANEL - Abnormal; Notable for the following components:    Sodium 135 (*)     CO2 34 (*)     Glucose 233 (*)     BUN 30 (*)     Albumin 2.5 (*)     Anion Gap 4 (*)     All other components within normal limits   B-TYPE NATRIURETIC PEPTIDE - Abnormal; Notable for the following components:     (*)     All other components within normal limits   URINALYSIS, REFLEX TO URINE CULTURE - Abnormal; Notable for the following components:    Appearance, UA Hazy (*)     Glucose, UA 1+ (*)     All other components within normal limits    Narrative:     Specimen Source->Urine   HIV 1 / 2  ANTIBODY    Narrative:     Release to patient->Immediate   MAGNESIUM   CORTISOL, RANDOM   LACTIC ACID, PLASMA   TYPE & SCREEN          Imaging Results              X-Ray Chest AP Portable (Final result)  Result time 09/23/22 12:42:13      Final result by Rocco Davenport Jr., MD (09/23/22 12:42:13)                   Impression:      Abnormal study though similar.      Electronically signed by: Rocco Davenport MD  Date:    09/23/2022  Time:    12:42               Narrative:    EXAMINATION:  XR CHEST AP PORTABLE    CLINICAL HISTORY:  sob;    TECHNIQUE:  Single frontal view of the chest was performed.    COMPARISON:  September 14, 2022    FINDINGS:  Tracheostomy cannula and monitoring leads noted.  Heart is enlarged.  Increase in the pulmonary vascular and interstitial markings.  Bibasilar effusions again noted.  Some patchy increased markings in the right mid lung field though no confluent consolidation.                                       Medications   piperacillin-tazobactam 4.5 g in sodium chloride 0.9% 100 mL IVPB (ready to mix system) (4.5 g Intravenous Not Given 9/23/22 1300)   0.9%  NaCl infusion (for blood administration) ( Intravenous New Bag 9/23/22 1807)   tobramycin (PF) 300 mg/5 mL nebulizer solution 300 mg (300 mg Nebulization Not Given 9/24/22 0800)   piperacillin-tazobactam 4.5 g in sodium chloride 0.9% 100 mL IVPB (ready to mix system) (0 g Intravenous Stopped 9/24/22 1004)   pantoprazole injection 40 mg (40 mg Intravenous Given 9/24/22 0829)   sodium chloride 0.9% flush 5 mL (has no administration in time range)   melatonin tablet 6 mg (has no administration in time range)   ondansetron injection 8 mg (has no administration in time range)   polyethylene glycol packet 17 g (has no administration in time range)   acetaminophen tablet 650 mg (has no administration in time range)   acetaminophen tablet 1,000 mg (has no administration in time range)   atorvastatin tablet 20 mg (20 mg Per G Tube Given  9/24/22 0830)   prednisone 1 mg/mL oral solution 20 mg (has no administration in time range)   lactated ringers bolus 1,000 mL (0 mLs Intravenous Stopped 9/23/22 1500)   dexamethasone injection 10 mg (10 mg Intravenous Given 9/23/22 1309)   vancomycin in dextrose 5 % 1 gram/250 mL IVPB 1,000 mg (0 mg Intravenous Stopped 9/23/22 1703)   pantoprazole injection 80 mg (80 mg Intravenous Given 9/23/22 1355)     Medical Decision Making:   History:   Old Medical Records: I decided to obtain old medical records.  Initial Assessment:   Worsening shortness of breath and oxygen requirement  Patient apparently on 5 L at home and per the respiratory therapist here he is requiring 10 L at 60% in our ED  Respiratory therapist also notes that he was the 1 who took care of the patient last week and suction to him and that his secretions today appear much worse.  He is already on Augmentin for pneumonia  Also hypotensive on my initial assessment and appears dry  Differential Diagnosis:   DDx for SOB would be broad.  It would include serious conditions such as CHF, PE, ACS, pneumothorax, pleural effusion, pericardial effusion, severe anemia, asthma and/or COPD exacerbation and pneumonia.      Suspect worsening pneumonia with failure of outpatient management  Clinical Tests:   Lab Tests: Reviewed and Ordered  Radiological Study: Reviewed and Ordered  Medical Tests: Reviewed and Ordered  ED Management:  Labs  IV fluids  Will need admission for IV antibiotics, particularly given new O2 requirement.    1:48 PM  CBC is just resulted and patient's hemoglobin is 5.5 which is a significant drop from a hemoglobin of 8.  On reassessment he denies any dark stools, blood in stools but I performed a rectal exam and he has melena that is strongly guaiac positive.  I have ordered him 2 units of PRBCs, 80 mg of IV Protonix and will consult GI.   Patient has been on steroids for weeks and I suspect that he likely has gastritis and/or ulcer bleed.  I  initially had given him Decadron for hypotension when he 1st came in as I was worried about some adrenal insufficiency that could be contributing to his symptoms.          Attending Attestation:         Attending Critical Care:   Critical Care Times:   ==============================================================  Total Critical Care Time - exclusive of procedural time: 35 minutes.  ==============================================================  Critical care was necessary to treat or prevent imminent or life-threatening deterioration of the following conditions: GI bleeding and respiratory failure.   Critical care was time spent personally by me on the following activities: obtaining history from patient or relative, development of treatment plan with patient or relative, review of old charts, examination of patient, review of x-rays / CT sent with the patient, ordering lab, x-rays, and/or EKG, ordering and performing treatments and interventions, evaluation of patient's response to treatment, re-evaluation of patient's conition and discussion with consultants.   Critical Care Condition: potentially life-threatening                      Clinical Impression:   Final diagnoses:  [J18.9] Pneumonia of both lungs due to infectious organism, unspecified part of lung (Primary)      ED Disposition Condition    Admit Stable                Kirsten Graham MD  09/24/22 6662

## 2022-09-23 NOTE — ED NOTES
Report called to Lulu on 8th floor --------------------------------going to [id number]( waiting for telemetry box)

## 2022-09-23 NOTE — HPI
Mr. Fareed Richard Jr. is a 72 y.o. 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  History is obtained from wife at bedside.  She reports that at baseline, he is on 5L trach collar.  However, for the last 1-2 weeks he has been having worsening respiratory issues.  He is currently on a prolonged Prednisone taper.  He went to the [facility] ER on 9/14 for hemoptysis and bloody trach secretions.  Hemoglobin was 8.4.  At that encounter, he was evaluated by ENT who felt like his airway was patent.  His hemoptysis stopped before leaving the ER.  He was discharged with Augmentin for aspiration pneumonia seen on CT scan.  However, on the day of admission he complained of chest pain, shortness of breath, and worsening fatigue.  Wife also endorsed yellow/green and foul colored trach secretions, but no fever or chills.  She suctioned him, which did not help - so she had to increase his home oxygen from 5L to 10L.  EMS was called and suctioned him, getting his oxygen saturations to 100% on 5L NC.  He denies any dark stools to his knowledge, but is on Aspirin and Plavix.  Chart review showed multiple respiratory cultures with Pseudomonas, unclear if colonization.    Upon arrival to the ER, vitals were temp 98.2F, HR 87, /78 satting 100% on 10L trach collar, 60% FiO2.  Labs showed Hemoglobin 5.5 and .  CXR showed pulmonary vascular and interstitial markings with effusions, no clear consolidation.  HAYLEE was positive for melena.  He was given 1L NS, Vanc, Zosyn.  2 units PRBCs were ordered and he was given IV PPI.  He was admitted to Hospital Medicine for further management.

## 2022-09-24 LAB
ASCENDING AORTA: 3.2 CM
AV INDEX (PROSTH): 0.95
AV MEAN GRADIENT: 2 MMHG
AV PEAK GRADIENT: 3 MMHG
AV VALVE AREA: 3.19 CM2
AV VELOCITY RATIO: 0.76
BASOPHILS # BLD AUTO: 0.01 K/UL (ref 0–0.2)
BASOPHILS NFR BLD: 0.1 % (ref 0–1.9)
BSA FOR ECHO PROCEDURE: 1.63 M2
CV ECHO LV RWT: 0.47 CM
DIFFERENTIAL METHOD: ABNORMAL
DOP CALC AO PEAK VEL: 0.86 M/S
DOP CALC AO VTI: 17.59 CM
DOP CALC LVOT AREA: 3.4 CM2
DOP CALC LVOT DIAMETER: 2.07 CM
DOP CALC LVOT PEAK VEL: 0.65 M/S
DOP CALC LVOT STROKE VOLUME: 56.17 CM3
DOP CALCLVOT PEAK VEL VTI: 16.7 CM
E WAVE DECELERATION TIME: 142.82 MSEC
E/A RATIO: 0.79
E/E' RATIO: 14.15 M/S
ECHO LV POSTERIOR WALL: 1 CM (ref 0.6–1.1)
EJECTION FRACTION: 55 %
EOSINOPHIL # BLD AUTO: 0 K/UL (ref 0–0.5)
EOSINOPHIL NFR BLD: 0 % (ref 0–8)
ERYTHROCYTE [DISTWIDTH] IN BLOOD BY AUTOMATED COUNT: 16.6 % (ref 11.5–14.5)
FRACTIONAL SHORTENING: 37 % (ref 28–44)
HCT VFR BLD AUTO: 25.7 % (ref 40–54)
HGB BLD-MCNC: 7.8 G/DL (ref 14–18)
IMM GRANULOCYTES # BLD AUTO: 0.05 K/UL (ref 0–0.04)
IMM GRANULOCYTES NFR BLD AUTO: 0.6 % (ref 0–0.5)
INTERVENTRICULAR SEPTUM: 1.06 CM (ref 0.6–1.1)
IVRT: 94.2 MSEC
LA MAJOR: 5.8 CM
LA MINOR: 5.2 CM
LA WIDTH: 4.23 CM
LEFT ATRIUM SIZE: 3.21 CM
LEFT ATRIUM VOLUME INDEX MOD: 40 ML/M2
LEFT ATRIUM VOLUME INDEX: 39.1 ML/M2
LEFT ATRIUM VOLUME MOD: 64.86 CM3
LEFT ATRIUM VOLUME: 63.29 CM3
LEFT INTERNAL DIMENSION IN SYSTOLE: 2.7 CM (ref 2.1–4)
LEFT VENTRICLE DIASTOLIC VOLUME INDEX: 50.7 ML/M2
LEFT VENTRICLE DIASTOLIC VOLUME: 82.14 ML
LEFT VENTRICLE MASS INDEX: 91 G/M2
LEFT VENTRICLE SYSTOLIC VOLUME INDEX: 16.7 ML/M2
LEFT VENTRICLE SYSTOLIC VOLUME: 27.08 ML
LEFT VENTRICULAR INTERNAL DIMENSION IN DIASTOLE: 4.28 CM (ref 3.5–6)
LEFT VENTRICULAR MASS: 147.4 G
LV LATERAL E/E' RATIO: 13.14 M/S
LV SEPTAL E/E' RATIO: 15.33 M/S
LYMPHOCYTES # BLD AUTO: 0.3 K/UL (ref 1–4.8)
LYMPHOCYTES NFR BLD: 3.6 % (ref 18–48)
MCH RBC QN AUTO: 26.3 PG (ref 27–31)
MCHC RBC AUTO-ENTMCNC: 30.4 G/DL (ref 32–36)
MCV RBC AUTO: 87 FL (ref 82–98)
MONOCYTES # BLD AUTO: 0.3 K/UL (ref 0.3–1)
MONOCYTES NFR BLD: 3.4 % (ref 4–15)
MV PEAK A VEL: 1.16 M/S
MV PEAK E VEL: 0.92 M/S
MV STENOSIS PRESSURE HALF TIME: 41.42 MS
MV VALVE AREA P 1/2 METHOD: 5.31 CM2
NEUTROPHILS # BLD AUTO: 7.9 K/UL (ref 1.8–7.7)
NEUTROPHILS NFR BLD: 92.3 % (ref 38–73)
NRBC BLD-RTO: 0 /100 WBC
PISA TR MAX VEL: 2.79 M/S
PLATELET # BLD AUTO: 272 K/UL (ref 150–450)
PMV BLD AUTO: 9.5 FL (ref 9.2–12.9)
RA MAJOR: 5.62 CM
RA PRESSURE: 3 MMHG
RA WIDTH: 4.15 CM
RBC # BLD AUTO: 2.97 M/UL (ref 4.6–6.2)
RIGHT VENTRICULAR END-DIASTOLIC DIMENSION: 3.15 CM
RV TISSUE DOPPLER FREE WALL SYSTOLIC VELOCITY 1 (APICAL 4 CHAMBER VIEW): 12.65 CM/S
SINUS: 4.5 CM
STJ: 3.33 CM
TDI LATERAL: 0.07 M/S
TDI SEPTAL: 0.06 M/S
TDI: 0.07 M/S
TR MAX PG: 31 MMHG
TRICUSPID ANNULAR PLANE SYSTOLIC EXCURSION: 2.42 CM
TV REST PULMONARY ARTERY PRESSURE: 34 MMHG
WBC # BLD AUTO: 8.55 K/UL (ref 3.9–12.7)

## 2022-09-24 PROCEDURE — 85025 COMPLETE CBC W/AUTO DIFF WBC: CPT | Performed by: HOSPITALIST

## 2022-09-24 PROCEDURE — 97530 THERAPEUTIC ACTIVITIES: CPT

## 2022-09-24 PROCEDURE — 27000221 HC OXYGEN, UP TO 24 HOURS

## 2022-09-24 PROCEDURE — 25000003 PHARM REV CODE 250: Performed by: HOSPITALIST

## 2022-09-24 PROCEDURE — 99222 1ST HOSP IP/OBS MODERATE 55: CPT | Mod: GC,,, | Performed by: INTERNAL MEDICINE

## 2022-09-24 PROCEDURE — 63600175 PHARM REV CODE 636 W HCPCS: Performed by: HOSPITALIST

## 2022-09-24 PROCEDURE — 25000242 PHARM REV CODE 250 ALT 637 W/ HCPCS: Performed by: HOSPITALIST

## 2022-09-24 PROCEDURE — 99232 SBSQ HOSP IP/OBS MODERATE 35: CPT | Mod: ,,, | Performed by: HOSPITALIST

## 2022-09-24 PROCEDURE — 99232 PR SUBSEQUENT HOSPITAL CARE,LEVL II: ICD-10-PCS | Mod: ,,, | Performed by: HOSPITALIST

## 2022-09-24 PROCEDURE — 99223 1ST HOSP IP/OBS HIGH 75: CPT | Mod: GC,,, | Performed by: STUDENT IN AN ORGANIZED HEALTH CARE EDUCATION/TRAINING PROGRAM

## 2022-09-24 PROCEDURE — 20600001 HC STEP DOWN PRIVATE ROOM

## 2022-09-24 PROCEDURE — C9113 INJ PANTOPRAZOLE SODIUM, VIA: HCPCS | Performed by: HOSPITALIST

## 2022-09-24 PROCEDURE — 99223 PR INITIAL HOSPITAL CARE,LEVL III: ICD-10-PCS | Mod: GC,,, | Performed by: STUDENT IN AN ORGANIZED HEALTH CARE EDUCATION/TRAINING PROGRAM

## 2022-09-24 PROCEDURE — 99900035 HC TECH TIME PER 15 MIN (STAT)

## 2022-09-24 PROCEDURE — 99222 PR INITIAL HOSPITAL CARE,LEVL II: ICD-10-PCS | Mod: GC,,, | Performed by: INTERNAL MEDICINE

## 2022-09-24 PROCEDURE — 36415 COLL VENOUS BLD VENIPUNCTURE: CPT | Performed by: HOSPITALIST

## 2022-09-24 PROCEDURE — 94761 N-INVAS EAR/PLS OXIMETRY MLT: CPT

## 2022-09-24 PROCEDURE — 97161 PT EVAL LOW COMPLEX 20 MIN: CPT

## 2022-09-24 PROCEDURE — 99900026 HC AIRWAY MAINTENANCE (STAT)

## 2022-09-24 RX ORDER — GLYCOPYRROLATE 1 MG/5ML
2 SOLUTION ORAL 3 TIMES DAILY PRN
Status: DISCONTINUED | OUTPATIENT
Start: 2022-09-24 | End: 2022-09-30 | Stop reason: HOSPADM

## 2022-09-24 RX ORDER — LEVOFLOXACIN 750 MG/1
750 TABLET ORAL DAILY
Status: COMPLETED | OUTPATIENT
Start: 2022-09-24 | End: 2022-09-30

## 2022-09-24 RX ADMIN — PIPERACILLIN SODIUM AND TAZOBACTAM SODIUM 4.5 G: 4; .5 INJECTION, POWDER, LYOPHILIZED, FOR SOLUTION INTRAVENOUS at 01:09

## 2022-09-24 RX ADMIN — GLYCOPYRROLATE 2 MG: 1 LIQUID ORAL at 11:09

## 2022-09-24 RX ADMIN — LEVOFLOXACIN 750 MG: 750 TABLET, FILM COATED ORAL at 04:09

## 2022-09-24 RX ADMIN — PANTOPRAZOLE SODIUM 40 MG: 40 INJECTION, POWDER, FOR SOLUTION INTRAVENOUS at 08:09

## 2022-09-24 RX ADMIN — ATORVASTATIN CALCIUM 20 MG: 20 TABLET, FILM COATED ORAL at 08:09

## 2022-09-24 RX ADMIN — PANTOPRAZOLE SODIUM 40 MG: 40 INJECTION, POWDER, FOR SOLUTION INTRAVENOUS at 09:09

## 2022-09-24 RX ADMIN — TOBRAMYCIN 300 MG: 300 SOLUTION RESPIRATORY (INHALATION) at 09:09

## 2022-09-24 RX ADMIN — PREDNISONE 20 MG: 5 SOLUTION ORAL at 02:09

## 2022-09-24 RX ADMIN — PIPERACILLIN SODIUM AND TAZOBACTAM SODIUM 4.5 G: 4; .5 INJECTION, POWDER, LYOPHILIZED, FOR SOLUTION INTRAVENOUS at 06:09

## 2022-09-24 NOTE — PLAN OF CARE
Problem: Adjustment to Illness (Gastrointestinal Bleeding)  Goal: Optimal Coping with Acute Illness  Outcome: Ongoing, Progressing     Problem: Bleeding (Gastrointestinal Bleeding)  Goal: Hemostasis  Outcome: Ongoing, Progressing     Problem: Adult Inpatient Plan of Care  Goal: Plan of Care Review  Outcome: Ongoing, Progressing  Goal: Patient-Specific Goal (Individualized)  Outcome: Ongoing, Progressing  Goal: Absence of Hospital-Acquired Illness or Injury  Outcome: Ongoing, Progressing  Goal: Optimal Comfort and Wellbeing  Outcome: Ongoing, Progressing  Goal: Readiness for Transition of Care  Outcome: Ongoing, Progressing     Problem: Fluid Imbalance (Pneumonia)  Goal: Fluid Balance  Outcome: Ongoing, Progressing     Problem: Infection (Pneumonia)  Goal: Resolution of Infection Signs and Symptoms  Outcome: Ongoing, Progressing     Problem: Respiratory Compromise (Pneumonia)  Goal: Effective Oxygenation and Ventilation  Outcome: Ongoing, Progressing     Problem: Impaired Wound Healing  Goal: Optimal Wound Healing  Outcome: Ongoing, Progressing

## 2022-09-24 NOTE — ASSESSMENT & PLAN NOTE
· GIB Pathway initiated  · Hgb 5.5 on admit, baseline around 8 as of 2 weeks ago - now 7.8 s/p 2 units PRBCs  · Continue Protonix 40mg IV BID  · GI consulted, appreciate assistance  · NPO for possible EGD  · Type and screen, blood consent obtained  · Transfuse for Hemoglobin <7  · Coagulopathy goals:  Plt >50, INR <1.5

## 2022-09-24 NOTE — ASSESSMENT & PLAN NOTE
· Recently with hemoptysis and now yellow/green and foul smelling secretions from his trach and rhonchi - recent CT chest 9/14 with pneumonia  · Trach culture with GNR.  Previous resp cx with Pseudomonas, unclear if colonization  · Continue Zosyn for now, start date 9/23  · Continue Tobra nebs  · ID consult  · Can consider ENT eval

## 2022-09-24 NOTE — SUBJECTIVE & OBJECTIVE
Interval History: No acute events overnight.  Resp cx with GNR. ID consult pending.  On Zosyn and Tobra nebs.  GI consulted for GIB. Hgb up to 7.8 after 2 units PRBCs.  He wants to eat.    Review of Systems   Constitutional:  Negative for chills, fatigue and fever.   Respiratory:  Positive for cough and shortness of breath.    Cardiovascular:  Negative for chest pain, palpitations and leg swelling.   Gastrointestinal:  Negative for abdominal pain, diarrhea, nausea and vomiting.   Genitourinary:  Negative for dysuria and urgency.   Neurological:  Negative for dizziness and headaches.   All other systems reviewed and are negative.  Objective:     Vital Signs (Most Recent):  Temp: 97.1 °F (36.2 °C) (09/24/22 0827)  Pulse: 86 (09/24/22 0911)  Resp: 20 (09/24/22 0827)  BP: 107/60 (09/24/22 0827)  SpO2: (!) 92 % (09/24/22 0911)   Vital Signs (24h Range):  Temp:  [97 °F (36.1 °C)-98.2 °F (36.8 °C)] 97.1 °F (36.2 °C)  Pulse:  [70-87] 86  Resp:  [16-20] 20  SpO2:  [92 %-100 %] 92 %  BP: ()/(55-78) 107/60     Weight: 60.3 kg (132 lb 15 oz)  Body mass index is 23.55 kg/m².    Intake/Output Summary (Last 24 hours) at 9/24/2022 0972  Last data filed at 9/24/2022 0145  Gross per 24 hour   Intake 475 ml   Output 400 ml   Net 75 ml      Physical Exam  Constitutional:       Appearance: Normal appearance. He is well-developed.   HENT:      Head: Normocephalic and atraumatic.   Neck:      Comments: Trach  Cardiovascular:      Rate and Rhythm: Normal rate and regular rhythm.      Heart sounds: No murmur heard.  Pulmonary:      Effort: Pulmonary effort is normal. No respiratory distress.      Breath sounds: Normal breath sounds. No wheezing or rales.   Abdominal:      General: There is no distension.      Palpations: Abdomen is soft.      Tenderness: There is no abdominal tenderness.      Comments: Peg   Musculoskeletal:         General: No deformity.   Skin:     General: Skin is warm.      Coloration: Skin is pale.    Neurological:      General: No focal deficit present.      Mental Status: He is alert and oriented to person, place, and time. Mental status is at baseline.       Significant Labs: All pertinent labs within the past 24 hours have been reviewed.  CBC:   Recent Labs   Lab 09/23/22  1247 09/24/22  0253   WBC 7.74 8.55   HGB 5.5* 7.8*   HCT 19.4* 25.7*    272     Respiratory Culture:   Recent Labs   Lab 09/23/22  1451   GSRESP Rare WBC's  Moderate Gram negative rods   RESPIRATORYC GRAM NEGATIVE CRISTINA  Few  Identification and susceptibility pending  *       Significant Imaging: I have reviewed all pertinent imaging results/findings within the past 24 hours.

## 2022-09-24 NOTE — HOSPITAL COURSE
Mr. Richard was admitted to Hospital Medicine for management of acute on chronic respiratory failure 2/2 tracheitis/pneumonia, as well as a GIB.  He was started transfused 2 units PRBCs, started on IV PPI and GI was consulted.  He was started on Zosyn and Tobramycin nebs for pneumonia/tracheitis.  Resp cx returned with pan sensitive Pseudomonas.  ID was consulted.  He was changed from Zosyn to Levaquin given the high AMRITA.  GI attempted EGD/cscope 9/27, but the prep was poor for his cscope, and he desatted requiring 8L with sedation, so neither procedure was performed.  Repeat CT chest was ordered, as well as intense RT intervention to optimize respiratory status so that GI can perform the procedures at the end of the week when respiratory status has been optimized and is more stable.  CT chest showed multifocal pneumonia.  He went for repeat EGD 9/30 that showed LA Grade B reflux esophagitis with no bleeding.  GI suggested PPI daily.  Cscope was unable to be performed given stool in the rectum.  He was restarted on Aspirin and Plavix on discharge given his multiple stents in various parts of his body.

## 2022-09-24 NOTE — PT/OT/SLP PROGRESS
Occupational Therapy      Patient Name:  Fareed Richard .   MRN:  [id number]    Patient not seen today secondary to Other (away for ECHO on attempts this PM). Will follow-up as scheduled for OT eval.    9/24/2022

## 2022-09-24 NOTE — CONSULTS
[facility] - Telemetry Stepdown  Infectious Disease  Consult Note    Patient Name: Fareed Richard Jr.  MRN: [id number]  Admission Date: 9/23/2022  Hospital Length of Stay: 1 days  Attending Physician: Brenda Saldivar MD  Primary Care Provider: Kodi Tubbs MD     Isolation Status: No active isolations    Patient information was obtained from patient, past medical records and ER records.      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 patient with multiple hospitalizations in the past for Pseudomonas and Citrobacter PNA. Resp culture collected on admission positive for GNRs. CXR with some mild patchy RML markings, however overall CXR fairly unremarkable. Resp failure likely 2/2 aspiration pneumonitis vs recurrent PNA.  Patient afebrile, without leukocytosis.     Recommendations:  -- initiate Levofloxacin 750mg q24h via PEG for pseudomonal coverage given previous susceptibilities  -- f/u respiratory culture S/S        Thank you for your consult. I will follow-up with patient. Please contact us if you have any additional questions.    Alexis Torres MD  Infectious Disease  [facility] - Telemetry Stepdown    Subjective:     Principal Problem: Acute on chronic respiratory failure    HPI: Mr. Fareed Richard is a 71 y/o M with hx of tobacco abuse c/b SCC of the tongue, s/p total glossectomy and flap with subsequent trach and peg, CAD, PVD on DAPT who presented to [facility] ED on 9/23 with complaint of worsening SOB. Patient has been having respiratory difficulty for the past 2 weeks. He first presented to [facility] ED on 9/14 for hemoptysis and bloody secretions from his trach. Hgb at that time noted to be 8.4. CTA chest with concerns for aspiration. ENT evaluated patient and  felt his airway was patent. Patient discharged to home on course of augmentin for aspiration PNA. However, since that time he has developed SOB, chest pain, and fatigue. No fever or chills. On day of presentation, pt hypoxic requiring increase from BL 5L O2 up to 10L, with associated green and yellow secretions from trach. Of note, patient has had multiple respiratory infections requiring admission in the past several months, and resp cultures have grown pseudomonas and Citrobacter. In the ED, patient efebrile, HDS, satting 100% on 10L O2. Hgb 5.5. CXR with some patchy increased markings in R mid lung fields. Patient given 2u pRBC and admitted to hospital medicine for continued management. ID consulted given hx of previous respiratory cultures and trach.       Past Medical History:   Diagnosis Date    Cancer     skin to Rt forearm and face    COPD (chronic obstructive pulmonary disease)     Hyperlipidemia     Hypertension     Pseudoaneurysm        Past Surgical History:   Procedure Laterality Date    ABDOMINAL SURGERY      stents placed in liver and large intestines, per patient    CAROTID STENT      CORONARY STENT PLACEMENT  01/2000    DISSECTION OF NECK Bilateral 1/4/2022    Procedure: DISSECTION, NECK;  Surgeon: Naeem Smalls MD;  Location: [address];  Service: ENT;  Laterality: Bilateral;    ESOPHAGOGASTRODUODENOSCOPY W/ PEG N/A 1/4/2022    Procedure: EGD, WITH PEG TUBE INSERTION;  Surgeon: Anthony Calabrese MD;  Location: [address];  Service: General;  Laterality: N/A;    EYE SURGERY      Cataract bilateral    femoral stents      bilateral    FLAP PROCEDURE N/A 1/3/2022    Procedure: CREATION, FREE FLAP;  Surgeon: Naeem Smalls MD;  Location: [address];  Service: ENT;  Laterality: N/A;    FLAP PROCEDURE Right 1/4/2022    Procedure: CREATION, FREE FLAP;  Surgeon: Stacey Conde MD;  Location: [facility] OR [address] [place] [address];  Service: ENT;  Laterality: Right;  Ischemic start  1203  Ischemic stop 1353    GLOSSECTOMY N/A 2022    Procedure: GLOSSECTOMY;  Surgeon: Naeem Smalls MD;  [facility];  Service: ENT;  Laterality: N/A;    RADICAL NECK DISSECTION Left 1/3/2022    Procedure: DISSECTION, NECK, RADICAL;  Surgeon: Naeem Smalls MD;  [facility];  Service: ENT;  Laterality: Left;    SKIN CANCER EXCISION      TRACHEOTOMY N/A 2022    Procedure: TRACHEOTOMY;  Surgeon: Naeem Smalls MD;  [facility];  Service: ENT;  Laterality: N/A;       Review of patient's allergies indicates:  No Known Allergies    Medications:  Medications Prior to Admission   Medication Sig    amoxicillin-clavulanate 875-125mg (AUGMENTIN) 875-125 mg per tablet Take 1 tablet by mouth 2 (two) times daily. for 10 days    aspirin 81 MG Chew 1 tablet (81 mg total) by Per G Tube route once daily.    atorvastatin (LIPITOR) 20 MG tablet 1 tablet (20 mg total) by Per G Tube route once daily.    bisacodyL (DULCOLAX) 10 mg Supp Place 1 suppository (10 mg total) rectally daily as needed.    clopidogreL (PLAVIX) 75 mg tablet 1 tablet (75 mg total) by Per G Tube route once daily.    glycopyrrolate (CUVPOSA) 1 mg/5 mL (0.2 mg/mL) Soln Take 5 mLs (1 mg total) by mouth 3 (three) times daily as needed (TO REDUCE SECRETIONS).    guaiFENesin 200 mg/5 mL Liqd Take 400 mg by mouth every 4 (four) hours as needed (for secretions).    hydrOXYzine HCL (ATARAX) 25 MG tablet SMARTSI.5 Tablet(s) Gastro Tube Every 8 Hours PRN    melatonin (MELATIN) 3 mg tablet 2 tablets (6 mg total) by Per G Tube route nightly.    metoprolol succinate (TOPROL-XL) 200 MG 24 hr tablet Take 200 mg by mouth 2 (two) times daily.    ondansetron (ZOFRAN-ODT) 8 MG TbDL Take 1 tablet (8 mg total) by mouth every 8 (eight) hours as needed (nausea). (Patient not taking: Reported on 2022)    oxyCODONE (ROXICODONE) 5 mg/5 mL Soln 5 mLs (5 mg total) by Per G Tube route every 4 (four) hours  as needed (Pain).    polyethylene glycol (GLYCOLAX) 17 gram PwPk 17 g by Per G Tube route 2 (two) times daily.    prednisone 5 mg/5 mL Soln Take 20 mLs (20 mg total) by mouth once daily for 7 days, THEN 10 mLs (10 mg total) once daily for 7 days.    sodium chloride (OCEAN) 0.65 % nasal spray 1 spray by Nasal route as needed for Congestion.    WIXELA INHUB 250-50 mcg/dose diskus inhaler SMARTSI Puff(s) By Mouth Every 12 Hours     Antibiotics (From admission, onward)      Start     Stop Route Frequency Ordered    22  tobramycin (PF) 300 mg/5 mL nebulizer solution 300 mg         -- NEBULIZATION 2 times daily 22 1405    22 1406  piperacillin-tazobactam 4.5 g in sodium chloride 0.9% 100 mL IVPB (ready to mix system)          2214 IV Every 8 hours (non-standard times) 22 1406    22 1315  piperacillin-tazobactam 4.5 g in sodium chloride 0.9% 100 mL IVPB (ready to mix system)          0114 IV ED 1 Time 22 1311          Antifungals (From admission, onward)      None          Antivirals (From admission, onward)      None             Immunization History   Administered Date(s) Administered    COVID-19, MRNA, LN-S, PF (MODERNA FULL 0.5 ML DOSE) 2021, 2021, 2021       Family History    None       Social History     Socioeconomic History    Marital status:    Tobacco Use    Smoking status: Former     Packs/day: 2.00     Years: 40.00     Pack years: 80.00     Types: Cigarettes     Start date: 1963     Quit date: 2018     Years since quittin.4    Smokeless tobacco: Never    Tobacco comments:     3/3 ppd x 40 yrs. Currently 3-4 cigarettes daily .He is trying  to quit. Is using a Vapor cigarettes  2-3 x's a day.   Substance and Sexual Activity    Alcohol use: Not Currently     Alcohol/week: 3.0 standard drinks     Types: 3 Cans of beer per week     Comment: beer daily 3-4    Drug use: No    Sexual activity: Not Currently      Review of Systems   Constitutional:  Negative for chills, fatigue and fever.   Respiratory:  Positive for shortness of breath (improving). Negative for cough.    Cardiovascular:  Negative for chest pain, palpitations and leg swelling.   Gastrointestinal:  Negative for abdominal pain, diarrhea, nausea and vomiting.   Genitourinary:  Negative for dysuria and urgency.   Neurological:  Negative for dizziness and headaches.   All other systems reviewed and are negative.  Objective:     Vital Signs (Most Recent):  Temp: 97.9 °F (36.6 °C) (09/24/22 1116)  Pulse: 73 (09/24/22 1116)  Resp: 18 (09/24/22 1116)  BP: 111/60 (09/24/22 1116)  SpO2: (!) 92 % (09/24/22 1116)   Vital Signs (24h Range):  Temp:  [97 °F (36.1 °C)-98 °F (36.7 °C)] 97.9 °F (36.6 °C)  Pulse:  [70-86] 73  Resp:  [16-20] 18  SpO2:  [92 %-100 %] 92 %  BP: ()/(55-64) 111/60     Weight: 60.3 kg (132 lb 15 oz)  Body mass index is 23.55 kg/m².    Estimated Creatinine Clearance: 75.6 mL/min (based on SCr of 0.7 mg/dL).    Physical Exam  Constitutional:       Appearance: Normal appearance. He is well-developed.      Comments: Chronically ill-appearing male lying in bed in [facility]   HENT:      Head: Normocephalic and atraumatic.   Neck:      Comments: Trach in place; no significant secretions noted  Cardiovascular:      Rate and Rhythm: Normal rate and regular rhythm.      Heart sounds: No murmur heard.  Pulmonary:      Effort: Pulmonary effort is normal. No respiratory distress.      Breath sounds: Normal breath sounds. No wheezing or rales.   Abdominal:      General: There is no distension.      Palpations: Abdomen is soft.      Tenderness: There is no abdominal tenderness.      Comments: Peg   Musculoskeletal:         General: No deformity.   Skin:     General: Skin is warm.      Coloration: Skin is pale.   Neurological:      General: No focal deficit present.      Mental Status: He is alert and oriented to person, place, and time. Mental status is at  baseline.       Significant Labs: Blood Culture:   Recent Labs   Lab 04/19/22  0925 04/19/22  0930 05/14/22  0725 09/23/22  1248 09/23/22  1300   LABBLOO No growth after 5 days. No growth after 5 days. No Growth after 4 days.  No Growth after 4 days. No Growth to date No Growth to date     CBC:   Recent Labs   Lab 09/23/22  1247 09/24/22  0253   WBC 7.74 8.55   HGB 5.5* 7.8*   HCT 19.4* 25.7*    272     CMP:   Recent Labs   Lab 09/23/22  1247   *   K 4.7   CL 97   CO2 34*   *   BUN 30*   CREATININE 0.7   CALCIUM 9.3   PROT 6.3   ALBUMIN 2.5*   BILITOT 0.1   ALKPHOS 64   AST 13   ALT 13   ANIONGAP 4*     Respiratory Culture:   Recent Labs   Lab 04/19/22  1119 05/14/22  1252 09/23/22  1451   GSRESP <10 epithelial cells per low power field.  Rare WBC's  Rare Gram positive cocci  Rare Gram positive rods <10 epithelial cells per low power field.  Rare WBC's  Few Gram positive rods  Rare Gram negative rods Rare WBC's  Moderate Gram negative rods   RESPIRATORYC No S aureus isolated.  PSEUDOMONAS AERUGINOSA  Rare  Normal respiratory ishmael also present  * PSEUDOMONAS AERUGINOSA  Moderate  * GRAM NEGATIVE CRISTINA  Few  Identification and susceptibility pending  *       Significant Imaging: I have reviewed all pertinent imaging results/findings within the past 24 hours.    X-Ray Chest AP Portable   Final Result      Abnormal study though similar.         Electronically signed by: Rocco Davenport MD   Date:    09/23/2022   Time:    12:42

## 2022-09-24 NOTE — SUBJECTIVE & OBJECTIVE
Past Medical History:   Diagnosis Date    Cancer     skin to Rt forearm and face    COPD (chronic obstructive pulmonary disease)     Hyperlipidemia     Hypertension     Pseudoaneurysm        Past Surgical History:   Procedure Laterality Date    ABDOMINAL SURGERY      stents placed in liver and large intestines, per patient    CAROTID STENT      CORONARY STENT PLACEMENT  01/2000    DISSECTION OF NECK Bilateral 1/4/2022    Procedure: DISSECTION, NECK;  Surgeon: Naeem Smalls MD;  Location: [address];  Service: ENT;  Laterality: Bilateral;    ESOPHAGOGASTRODUODENOSCOPY W/ PEG N/A 1/4/2022    Procedure: EGD, WITH PEG TUBE INSERTION;  Surgeon: Anthony Calabrese MD;  Location: [address];  Service: General;  Laterality: N/A;    EYE SURGERY      Cataract bilateral    femoral stents      bilateral    FLAP PROCEDURE N/A 1/3/2022    Procedure: CREATION, FREE FLAP;  Surgeon: Naeem Smalls MD;  Location: [address];  Service: ENT;  Laterality: N/A;    FLAP PROCEDURE Right 1/4/2022    Procedure: CREATION, FREE FLAP;  Surgeon: Stacey Conde MD;  Location: [address];  Service: ENT;  Laterality: Right;  Ischemic start 1203  Ischemic stop 1353    GLOSSECTOMY N/A 1/4/2022    Procedure: GLOSSECTOMY;  Surgeon: Naeem Smalls MD;  Location: [facility] OR [address] [place] [address];  Service: ENT;  Laterality: N/A;    RADICAL NECK DISSECTION Left 1/3/2022    Procedure: DISSECTION, NECK, RADICAL;  Surgeon: Naeem Smalls MD;  Location: [facility] OR [address] [place] [address];  Service: ENT;  Laterality: Left;    SKIN CANCER EXCISION      TRACHEOTOMY N/A 1/4/2022    Procedure: TRACHEOTOMY;  Surgeon: Naeem Smalls MD;  Location: [facility] OR [address] [place] [address];  Service: ENT;  Laterality: N/A;       Review of patient's allergies indicates:  No Known Allergies    Medications:  Medications Prior to Admission   Medication Sig    amoxicillin-clavulanate 875-125mg (AUGMENTIN) 875-125 mg per tablet Take 1 tablet by mouth 2 (two) times daily. for 10 days     aspirin 81 MG Chew 1 tablet (81 mg total) by Per G Tube route once daily.    atorvastatin (LIPITOR) 20 MG tablet 1 tablet (20 mg total) by Per G Tube route once daily.    bisacodyL (DULCOLAX) 10 mg Supp Place 1 suppository (10 mg total) rectally daily as needed.    clopidogreL (PLAVIX) 75 mg tablet 1 tablet (75 mg total) by Per G Tube route once daily.    glycopyrrolate (CUVPOSA) 1 mg/5 mL (0.2 mg/mL) Soln Take 5 mLs (1 mg total) by mouth 3 (three) times daily as needed (TO REDUCE SECRETIONS).    guaiFENesin 200 mg/5 mL Liqd Take 400 mg by mouth every 4 (four) hours as needed (for secretions).    hydrOXYzine HCL (ATARAX) 25 MG tablet SMARTSI.5 Tablet(s) Gastro Tube Every 8 Hours PRN    melatonin (MELATIN) 3 mg tablet 2 tablets (6 mg total) by Per G Tube route nightly.    metoprolol succinate (TOPROL-XL) 200 MG 24 hr tablet Take 200 mg by mouth 2 (two) times daily.    ondansetron (ZOFRAN-ODT) 8 MG TbDL Take 1 tablet (8 mg total) by mouth every 8 (eight) hours as needed (nausea). (Patient not taking: Reported on 2022)    oxyCODONE (ROXICODONE) 5 mg/5 mL Soln 5 mLs (5 mg total) by Per G Tube route every 4 (four) hours as needed (Pain).    polyethylene glycol (GLYCOLAX) 17 gram PwPk 17 g by Per G Tube route 2 (two) times daily.    prednisone 5 mg/5 mL Soln Take 20 mLs (20 mg total) by mouth once daily for 7 days, THEN 10 mLs (10 mg total) once daily for 7 days.    sodium chloride (OCEAN) 0.65 % nasal spray 1 spray by Nasal route as needed for Congestion.    WIXELA INHUB 250-50 mcg/dose diskus inhaler SMARTSI Puff(s) By Mouth Every 12 Hours     Antibiotics (From admission, onward)      Start     Stop Route Frequency Ordered    22  tobramycin (PF) 300 mg/5 mL nebulizer solution 300 mg         -- NEBULIZATION 2 times daily 22 1405    22 1406  piperacillin-tazobactam 4.5 g in sodium chloride 0.9% 100 mL IVPB (ready to mix system)         2214 IV Every 8 hours (non-standard times)  22 1406    22 1315  piperacillin-tazobactam 4.5 g in sodium chloride 0.9% 100 mL IVPB (ready to mix system)          0114 IV ED 1 Time 22 1311          Antifungals (From admission, onward)      None          Antivirals (From admission, onward)      None             Immunization History   Administered Date(s) Administered    COVID-19, MRNA, LN-S, PF (MODERNA FULL 0.5 ML DOSE) 2021, 2021, 2021       Family History    None       Social History     Socioeconomic History    Marital status:    Tobacco Use    Smoking status: Former     Packs/day: 2.00     Years: 40.00     Pack years: 80.00     Types: Cigarettes     Start date: 1963     Quit date: 2018     Years since quittin.4    Smokeless tobacco: Never    Tobacco comments:     3/3 ppd x 40 yrs. Currently 3-4 cigarettes daily .He is trying  to quit. Is using a Vapor cigarettes  2-3 x's a day.   Substance and Sexual Activity    Alcohol use: Not Currently     Alcohol/week: 3.0 standard drinks     Types: 3 Cans of beer per week     Comment: beer daily 3-4    Drug use: No    Sexual activity: Not Currently     Review of Systems   Constitutional:  Negative for chills, fatigue and fever.   Respiratory:  Positive for shortness of breath (improving). Negative for cough.    Cardiovascular:  Negative for chest pain, palpitations and leg swelling.   Gastrointestinal:  Negative for abdominal pain, diarrhea, nausea and vomiting.   Genitourinary:  Negative for dysuria and urgency.   Neurological:  Negative for dizziness and headaches.   All other systems reviewed and are negative.  Objective:     Vital Signs (Most Recent):  Temp: 97.9 °F (36.6 °C) (22 1116)  Pulse: 73 (22 1116)  Resp: 18 (22 1116)  BP: 111/60 (22 1116)  SpO2: (!) 92 % (22 1116)   Vital Signs (24h Range):  Temp:  [97 °F (36.1 °C)-98 °F (36.7 °C)] 97.9 °F (36.6 °C)  Pulse:  [70-86] 73  Resp:  [16-20] 18  SpO2:  [92 %-100 %] 92  %  BP: ()/(55-64) 111/60     Weight: 60.3 kg (132 lb 15 oz)  Body mass index is 23.55 kg/m².    Estimated Creatinine Clearance: 75.6 mL/min (based on SCr of 0.7 mg/dL).    Physical Exam  Constitutional:       Appearance: Normal appearance. He is well-developed.      Comments: Chronically ill-appearing male lying in bed in [facility]   HENT:      Head: Normocephalic and atraumatic.   Neck:      Comments: Trach in place; no significant secretions noted  Cardiovascular:      Rate and Rhythm: Normal rate and regular rhythm.      Heart sounds: No murmur heard.  Pulmonary:      Effort: Pulmonary effort is normal. No respiratory distress.      Breath sounds: Normal breath sounds. No wheezing or rales.   Abdominal:      General: There is no distension.      Palpations: Abdomen is soft.      Tenderness: There is no abdominal tenderness.      Comments: Peg   Musculoskeletal:         General: No deformity.   Skin:     General: Skin is warm.      Coloration: Skin is pale.   Neurological:      General: No focal deficit present.      Mental Status: He is alert and oriented to person, place, and time. Mental status is at baseline.       Significant Labs: Blood Culture:   Recent Labs   Lab 04/19/22  0925 04/19/22  0930 05/14/22  0725 09/23/22  1248 09/23/22  1300   LABBLOO No growth after 5 days. No growth after 5 days. No Growth after 4 days.  No Growth after 4 days. No Growth to date No Growth to date     CBC:   Recent Labs   Lab 09/23/22  1247 09/24/22  0253   WBC 7.74 8.55   HGB 5.5* 7.8*   HCT 19.4* 25.7*    272     CMP:   Recent Labs   Lab 09/23/22  1247   *   K 4.7   CL 97   CO2 34*   *   BUN 30*   CREATININE 0.7   CALCIUM 9.3   PROT 6.3   ALBUMIN 2.5*   BILITOT 0.1   ALKPHOS 64   AST 13   ALT 13   ANIONGAP 4*     Respiratory Culture:   Recent Labs   Lab 04/19/22  1119 05/14/22  1252 09/23/22  1451   GSRESP <10 epithelial cells per low power field.  Rare WBC's  Rare Gram positive cocci  Rare Gram  positive rods <10 epithelial cells per low power field.  Rare WBC's  Few Gram positive rods  Rare Gram negative rods Rare WBC's  Moderate Gram negative rods   RESPIRATORYC No S aureus isolated.  PSEUDOMONAS AERUGINOSA  Rare  Normal respiratory ishmael also present  * PSEUDOMONAS AERUGINOSA  Moderate  * GRAM NEGATIVE CRISTINA  Few  Identification and susceptibility pending  *       Significant Imaging: I have reviewed all pertinent imaging results/findings within the past 24 hours.    X-Ray Chest AP Portable   Final Result      Abnormal study though similar.         Electronically signed by: Rocco Davenport MD   Date:    09/23/2022   Time:    12:42

## 2022-09-24 NOTE — HPI
Mr. Fareed Richard is a 71 y/o M with hx of tobacco abuse c/b SCC of the tongue, s/p total glossectomy and flap with subsequent trach and peg, CAD, PVD on DAPT who [place] [facility] [place]23 with complaint of worsening SOB. Patient has been having respiratory difficulty for the past 2 weeks. He first presented [place] [facility] [place] on 9/14 for hemoptysis and bloody secretions from his trach. Hgb at that time noted to be 8.4. CTA chest with concerns for aspiration. ENT evaluated patient and felt his airway was patent. Patient discharged to home on course of augmentin for aspiration PNA. However, since that time he has developed SOB, chest pain, and fatigue. No fever or chills. On day of presentation, pt hypoxic requiring increase from BL 5L O2 up to 10L, with associated green and yellow secretions from trach. Of note, patient has had multiple respiratory infections requiring admission in the past several months, and resp cultures have grown pseudomonas and Citrobacter. In the ED, patient efebrile, HDS, satting 100% on 10L O2. Hgb 5.5. CXR with some patchy increased markings in R mid lung fields. Patient given 2u pRBC and admitted to hospital medicine for continued management. ID consulted given hx of previous respiratory cultures and trach.

## 2022-09-24 NOTE — PT/OT/SLP EVAL
Physical Therapy Evaluation    Patient Name:  Fareed Richard Jr.   MRN:  [id number]    Recommendations:     Discharge Recommendations:  home   Discharge Equipment Recommendations:  (TBD)   Barriers to discharge: None    Assessment:     Fareed Richard Jr. is a 72 y.o. male admitted with a medical diagnosis of Acute on chronic respiratory failure.  He presents with the following impairments/functional limitations:  weakness, impaired endurance, impaired functional mobility, decreased lower extremity function, gait instability, impaired balance.  Patient cooperative with PT assessment.  Patient mobilizes well -- mobility limited by reduce cardiorespiratory response to exercise. Patient has trach collar on 5LO2 - desaturates to mid-80's with minimal activity.  Patient's wife serves as caregiver and provides physical assistance as needed.  Patient able to communicate needs effectively despite glossectomy.  Patient will benefit from skilled PT to address deficits and maximize function.     Rehab Prognosis: Fair;    Recent Surgery: * No surgery found *      Plan:     During this hospitalization, patient to be seen 3 x/week to address the identified rehab impairments via gait training, therapeutic activities, therapeutic exercises, neuromuscular re-education and progress toward the following goals:    Plan of Care Expires:  10/08/22    Subjective     Chief Complaint: none  Patient/Family Comments/goals: none  Pain/Comfort:  Pain Rating 1: 0/10    Patients cultural, spiritual, Confucianist conflicts given the current situation: no    Living Environment:  Patient lives with spouse in a single story home with 5 steps to enter.   Prior to admission, patients level of function was independent.  Equipment used at home: none.  DME owned (not currently used): none.  Upon discharge, patient will have assistance from spouse.    Objective:     Communicated with RN prior to session.  Patient found supine with blood pressure cuff, oxygen,  peripheral IV, telemetry  upon PT entry to room.    General Precautions: Standard, respiratory, fall   Orthopedic Precautions:N/A   Braces: N/A  Respiratory Status:  trach collar 5LO2     Exams:  Cognitive Exam:  Patient is oriented to Person, Place, Time, and Situation  RUE ROM: WFL  RUE Strength: WFL  LUE ROM: WFL  LUE Strength: WFL  RLE ROM: WFL  RLE Strength: WFL  LLE ROM: WFL  LLE Strength: WFL    Functional Mobility:  Bed Mobility:     Scooting: stand by assistance  Supine to Sit: stand by assistance  Transfers:     Sit to Stand:  stand by assistance with hand-held assist  Gait: Patient took several steps next to bed - SBA-  hand-held  Balance: Patient has sufficient sitting balance;  Patient able to respond to unexpected perturbation when upright    AM-PAC 6 CLICK MOBILITY  Total Score:20     Patient left supine with all lines intact, RN notified, and RN present.    GOALS:   Multidisciplinary Problems       Physical Therapy Goals          Problem: Physical Therapy    Goal Priority Disciplines Outcome Goal Variances Interventions   Physical Therapy Goal     PT, PT/OT Ongoing, Progressing     Description: Goals to be met by: 10/8/2022      Patient will increase functional independence with mobility by performin. Supine to sit with Modified [place]  2. Sit to stand transfer with Modified [place]  3. Gait  x 150 feet with Modified [place] using the least restrictive device.                           History:     Past Medical History:   Diagnosis Date    Cancer     skin to Rt forearm and face    COPD (chronic obstructive pulmonary disease)     Hyperlipidemia     Hypertension     Pseudoaneurysm        Past Surgical History:   Procedure Laterality Date    ABDOMINAL SURGERY      stents placed in liver and large intestines, per patient    CAROTID STENT      CORONARY STENT PLACEMENT  2000    DISSECTION OF NECK Bilateral 2022    Procedure: DISSECTION, NECK;  Surgeon: Naeem Smalls MD;   Location: [facility] OR 2ND FLR;  Service: ENT;  Laterality: Bilateral;    ESOPHAGOGASTRODUODENOSCOPY W/ PEG N/A 1/4/2022    Procedure: EGD, WITH PEG TUBE INSERTION;  Surgeon: Anthony Calabrese MD;  Location: [facility] OR 2ND FLR;  Service: General;  Laterality: N/A;    EYE SURGERY      Cataract bilateral    femoral stents      bilateral    FLAP PROCEDURE N/A 1/3/2022    Procedure: CREATION, FREE FLAP;  Surgeon: Naeem Smalls MD;  Location: [facility] OR [facility] FLR;  Service: ENT;  Laterality: N/A;    FLAP PROCEDURE Right 1/4/2022    Procedure: CREATION, FREE FLAP;  Surgeon: Stacey Conde MD;  Location: [facility] OR [facility] FLR;  Service: ENT;  Laterality: Right;  Ischemic start 1203  Ischemic stop 1353    GLOSSECTOMY N/A 1/4/2022    Procedure: GLOSSECTOMY;  Surgeon: Naeem Smalls MD;  [facility];  Service: ENT;  Laterality: N/A;    RADICAL NECK DISSECTION Left 1/3/2022    Procedure: DISSECTION, NECK, RADICAL;  Surgeon: Naeem Smalls MD;  [facility];  Service: ENT;  Laterality: Left;    SKIN CANCER EXCISION      TRACHEOTOMY N/A 1/4/2022    Procedure: TRACHEOTOMY;  Surgeon: Naeem Smalls MD;  [facility];  Service: ENT;  Laterality: N/A;       Time Tracking:     PT Received On: 09/24/22  PT Start Time: 0845     PT Stop Time: 0910  PT Total Time (min): 25 min     Billable Minutes: Evaluation 10 and Therapeutic Activity 15      09/24/2022

## 2022-09-24 NOTE — PLAN OF CARE
Problem: Physical Therapy  Goal: Physical Therapy Goal  Description: Goals to be met by: 10/8/2022      Patient will increase functional independence with mobility by performin. Supine to sit with Modified [place]  2. Sit to stand transfer with Modified [place]  3. Gait  x 150 feet with Modified [place] using the least restrictive device.      Outcome: Ongoing, Progressing

## 2022-09-24 NOTE — ASSESSMENT & PLAN NOTE
73 y/o M trach dependent presenting with acute on chronic respiratory failure requiring increase in supplemental O2. patient with multiple hospitalizations in the past for Pseudomonas and Citrobacter PNA. Resp culture collected on admission positive for GNRs. CXR with some mild patchy RML markings, however overall CXR fairly unremarkable. Resp failure likely 2/2 aspiration pneumonitis vs recurrent PNA.  Patient afebrile, without leukocytosis.     Recommendations:  -- initiate Levofloxacin 750mg q24h via PEG for pseudomonal coverage given previous susceptibilities  -- f/u respiratory culture S/S

## 2022-09-24 NOTE — RESPIRATORY THERAPY
RAPID RESPONSE RESPIRATORY THERAPY PROACTIVE NOTE           Time of visit: 910     Code Status: Full Code   : 1950  Bed: 8096/8096 A:   MRN: [id number]  Time spent at the bedside: < 15 min    SITUATION    Evaluated patient for: LDA Check     BACKGROUND    Patient has a past medical history of Cancer, COPD (chronic obstructive pulmonary disease), Hyperlipidemia, Hypertension, and Pseudoaneurysm.  Clinically Significant Surgical Hx: tracheostomy    24 Hours Vitals Range:  Temp:  [97 °F (36.1 °C)-98 °F (36.7 °C)]   Pulse:  [70-86]   Resp:  [16-20]   BP: ()/(55-64)   SpO2:  [92 %-100 %]     Labs:    Recent Labs     22  1247   *   K 4.7   CL 97   CO2 34*   CREATININE 0.7   *   MG 2.2        No results for input(s): PH, PCO2, PO2, HCO3, POCSATURATED, BE in the last 72 hours.    ASSESSMENT/INTERVENTIONS  Patient resting comfortably. No respiratory concerns at this time      Last VS   Temp: 97.9 °F (36.6 °C) ( 111)  Pulse: 73 ( 111)  Resp: 18 ( 111)  BP: 111/60 (1116)  SpO2: 92 % (1116)      Extra trachs at bedside: 4.0 Shiley Cuffed, 6.0 Shiley Cuffed  Level of Consciousness: Level of Consciousness (AVPU): alert  Respiratory Effort: Respiratory Effort: Normal, Unlabored Expansion/Accessory Muscle Usage: Expansion/Accessory Muscles/Retractions: no use of accessory muscles, no retractions  All Lung Field Breath Sounds: All Lung Fields Breath Sounds: Anterior:, Lateral:, coarse  DEE Breath Sounds: crackles, coarse  LLL Breath Sounds: crackles, coarse  RUL Breath Sounds: crackles, coarse  RML Breath Sounds: crackles, coarse  RLL Breath Sounds: crackles, coarse  O2 Device/Concentration: Trach Collar 10L/60%  Surgical airway: Yes, Type: Shiley Size: 6, uncuffed  Ambu at bedside: Ambu bag with the patient?: Yes, Adult Ambu     Active Orders   Respiratory Care    Oxygen Continuous     Frequency: Continuous     Number of Occurrences: Until Specified     Order Questions:       Device type: Low flow      Device: Trach Collar      Titrate O2 per Oxygen Titration Protocol: Yes      To maintain SpO2 goal of: >= 90%      Notify MD of: Inability to achieve desired SpO2; Sudden change in patient status and requires 20% increase in FiO2; Patient requires >60% FiO2       RECOMMENDATIONS    We recommend: RRT Recs: Continue POC per primary team.      FOLLOW-UP    Please call back the Rapid Response RTRuth Ann RRT at x [phone] for any questions or concerns.

## 2022-09-24 NOTE — PROGRESS NOTES
[facility] - Telemetry [facility] Medicine  Progress Note    Patient Name: Fareed Richard Jr.  MRN: [id number]  Patient Class: IP- Inpatient   Admission Date: 9/23/2022  Length of Stay: 1 days  Attending Physician: Brenda Saldivar MD  Primary Care Provider: Kodi Tubbs MD        Subjective:     Principal Problem:Acute on chronic respiratory failure        HPI:  Mr. Fareed Richard Jr. is a 72 y.o. 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  History is obtained from wife at bedside.  She reports that at baseline, he is on 5L trach collar.  However, for the last 1-2 weeks he has been having worsening respiratory issues.  He is currently on a prolonged Prednisone taper.  He went to the [facility] ER on 9/14 for hemoptysis and bloody trach secretions.  Hemoglobin was 8.4.  At that encounter, he was evaluated by ENT who felt like his airway was patent.  His hemoptysis stopped before leaving the ER.  He was discharged with Augmentin for aspiration pneumonia seen on CT scan.  However, on the day of admission he complained of chest pain, shortness of breath, and worsening fatigue.  Wife also endorsed yellow/green and foul colored trach secretions, but no fever or chills.  She suctioned him, which did not help - so she had to increase his home oxygen from 5L to 10L.  EMS was called and suctioned him, getting his oxygen saturations to 100% on 5L NC.  He denies any dark stools to his knowledge, but is on Aspirin and Plavix.  Chart review showed multiple respiratory cultures with Pseudomonas, unclear if colonization.    Upon arrival to the ER, vitals were temp 98.2F, HR 87, /78 satting 100% on 10L trach collar, 60% FiO2.  Labs showed Hemoglobin 5.5 and .  CXR showed pulmonary vascular and interstitial markings with effusions, no  clear consolidation.  HAYLEE was positive for melena.  He was given 1L NS, Vanc, Zosyn.  2 units PRBCs were ordered and he was given IV PPI.  He was admitted to Hospital Medicine for further management.      Overview/Hospital Course:  Mr. Richard was admitted to Hospital Medicine for management of acute on chronic respiratory failure 2/2 tracheitis/pneumonia, as well as a GIB.  He was started transfused 2 units PRBCs, started on IV PPI and GI was consulted.  He was started on Zosyn and Tobramycin nebs for pneumonia/tracheitis.  Resp cx returned with GNR.  ID was consulted.      Interval History: No acute events overnight.  Resp cx with GNR. ID consult pending.  On Zosyn and Tobra nebs.  GI consulted for GIB. Hgb up to 7.8 after 2 units PRBCs.  He wants to eat.    Review of Systems   Constitutional:  Negative for chills, fatigue and fever.   Respiratory:  Positive for cough and shortness of breath.    Cardiovascular:  Negative for chest pain, palpitations and leg swelling.   Gastrointestinal:  Negative for abdominal pain, diarrhea, nausea and vomiting.   Genitourinary:  Negative for dysuria and urgency.   Neurological:  Negative for dizziness and headaches.   All other systems reviewed and are negative.  Objective:     Vital Signs (Most Recent):  Temp: 97.1 °F (36.2 °C) (09/24/22 0827)  Pulse: 86 (09/24/22 0911)  Resp: 20 (09/24/22 0827)  BP: 107/60 (09/24/22 0827)  SpO2: (!) 92 % (09/24/22 0911)   Vital Signs (24h Range):  Temp:  [97 °F (36.1 °C)-98.2 °F (36.8 °C)] 97.1 °F (36.2 °C)  Pulse:  [70-87] 86  Resp:  [16-20] 20  SpO2:  [92 %-100 %] 92 %  BP: ()/(55-78) 107/60     Weight: 60.3 kg (132 lb 15 oz)  Body mass index is 23.55 kg/m².    Intake/Output Summary (Last 24 hours) at 9/24/2022 0910  Last data filed at 9/24/2022 0145  Gross per 24 hour   Intake 475 ml   Output 400 ml   Net 75 ml      Physical Exam  Constitutional:       Appearance: Normal appearance. He is well-developed.   HENT:      Head: Normocephalic  and atraumatic.   Neck:      Comments: Trach  Cardiovascular:      Rate and Rhythm: Normal rate and regular rhythm.      Heart sounds: No murmur heard.  Pulmonary:      Effort: Pulmonary effort is normal. No respiratory distress.      Breath sounds: Normal breath sounds. No wheezing or rales.   Abdominal:      General: There is no distension.      Palpations: Abdomen is soft.      Tenderness: There is no abdominal tenderness.      Comments: Peg   Musculoskeletal:         General: No deformity.   Skin:     General: Skin is warm.      Coloration: Skin is pale.   Neurological:      General: No focal deficit present.      Mental Status: He is alert and oriented to person, place, and time. Mental status is at baseline.       Significant Labs: All pertinent labs within the past 24 hours have been reviewed.  CBC:   Recent Labs   Lab 09/23/22  1247 09/24/22  0253   WBC 7.74 8.55   HGB 5.5* 7.8*   HCT 19.4* 25.7*    272     Respiratory Culture:   Recent Labs   Lab 09/23/22  1451   GSRESP Rare WBC's  Moderate Gram negative rods   RESPIRATORYC GRAM NEGATIVE CRISTINA  Few  Identification and susceptibility pending  *       Significant Imaging: I have reviewed all pertinent imaging results/findings within the past 24 hours.      Assessment/Plan:      * Acute on chronic respiratory failure  · On 5L trach collar at home, but now requiring 10L trach collar  · Likely 2/2 tracheitis and pneumonia  · Continue Prednisone taper from outpatient - 20mg daily x 7 days, then 10mg x  7 days  · Management as below    Pneumonia  · As above      Acute tracheitis  · Recently with hemoptysis and now yellow/green and foul smelling secretions from his trach and rhonchi - recent CT chest 9/14 with pneumonia  · Trach culture with GNR.  Previous resp cx with Pseudomonas, unclear if colonization  · Continue Zosyn for now, start date 9/23  · Continue Tobra nebs  · ID consult  · Can consider ENT eval      Melena  · GIB Pathway initiated  · Hgb 5.5 on  admit, baseline around 8 as of 2 weeks ago - now 7.8 s/p 2 units PRBCs  · Continue Protonix 40mg IV BID  · GI consulted, appreciate assistance  · NPO for possible EGD  · Type and screen, blood consent obtained  · Transfuse for Hemoglobin <7  · Coagulopathy goals:  Plt >50, INR <1.5        Tracheostomy present  · On 5L trach collar at home      Acute blood loss anemia  · See Melena      Elevated brain natriuretic peptide (BNP) level  · BNP newly elevated at 468  · Last 2D echo in April with normal EF   · Repeat 2D echo      PEG (percutaneous endoscopic gastrostomy) status  · See Dysphagia      Dysphagia  · S/p peg  · On TF - Hold for now until GI eval      Other hyperlipidemia  · Chronic and stable  · Continue Statin      Primary hypertension  · Chronic issue  · Hold Metoprolol with GIB and softer BP      Coronary artery disease involving native coronary artery of native heart without angina pectoris  · Chronic and stable  · Hold Aspirin and Plavix with GIB  · Continue Statin  · Hold Metoprolol with softer BP      Squamous cell cancer of tongue  · S/p chemo, radiation  · Now with flap, trach, and peg    Peripheral vascular disease  · Chronic issue  · Hold Aspirin and Plavix with GIB  · Continue Statin      VTE Risk Mitigation (From admission, onward)         Ordered     IP VTE HIGH RISK PATIENT  Once         09/23/22 1409     Place sequential compression device  Until discontinued         09/23/22 1408                Discharge Planning   NISA: 9/28/2022     Code Status: Full Code   Is the patient medically ready for discharge?: No    Reason for patient still in hospital (select all that apply): Treatment                     Brenda Saldivar MD  Department of Hospital Medicine   [facility] - Telemetry Stepdown

## 2022-09-25 LAB
BASOPHILS # BLD AUTO: 0.01 K/UL (ref 0–0.2)
BASOPHILS NFR BLD: 0.1 % (ref 0–1.9)
DIFFERENTIAL METHOD: ABNORMAL
EOSINOPHIL # BLD AUTO: 0.1 K/UL (ref 0–0.5)
EOSINOPHIL NFR BLD: 2 % (ref 0–8)
ERYTHROCYTE [DISTWIDTH] IN BLOOD BY AUTOMATED COUNT: 16.3 % (ref 11.5–14.5)
HCT VFR BLD AUTO: 27.1 % (ref 40–54)
HGB BLD-MCNC: 8.1 G/DL (ref 14–18)
IMM GRANULOCYTES # BLD AUTO: 0.03 K/UL (ref 0–0.04)
IMM GRANULOCYTES NFR BLD AUTO: 0.4 % (ref 0–0.5)
LYMPHOCYTES # BLD AUTO: 0.5 K/UL (ref 1–4.8)
LYMPHOCYTES NFR BLD: 6.3 % (ref 18–48)
MCH RBC QN AUTO: 26.2 PG (ref 27–31)
MCHC RBC AUTO-ENTMCNC: 29.9 G/DL (ref 32–36)
MCV RBC AUTO: 88 FL (ref 82–98)
MONOCYTES # BLD AUTO: 0.6 K/UL (ref 0.3–1)
MONOCYTES NFR BLD: 8.2 % (ref 4–15)
NEUTROPHILS # BLD AUTO: 5.9 K/UL (ref 1.8–7.7)
NEUTROPHILS NFR BLD: 83 % (ref 38–73)
NRBC BLD-RTO: 0 /100 WBC
PLATELET # BLD AUTO: 299 K/UL (ref 150–450)
PMV BLD AUTO: 9 FL (ref 9.2–12.9)
RBC # BLD AUTO: 3.09 M/UL (ref 4.6–6.2)
WBC # BLD AUTO: 7.16 K/UL (ref 3.9–12.7)

## 2022-09-25 PROCEDURE — 20600001 HC STEP DOWN PRIVATE ROOM

## 2022-09-25 PROCEDURE — C9113 INJ PANTOPRAZOLE SODIUM, VIA: HCPCS | Performed by: HOSPITALIST

## 2022-09-25 PROCEDURE — 25000003 PHARM REV CODE 250: Performed by: HOSPITALIST

## 2022-09-25 PROCEDURE — 25000242 PHARM REV CODE 250 ALT 637 W/ HCPCS: Performed by: HOSPITALIST

## 2022-09-25 PROCEDURE — 99233 PR SUBSEQUENT HOSPITAL CARE,LEVL III: ICD-10-PCS | Mod: ,,, | Performed by: STUDENT IN AN ORGANIZED HEALTH CARE EDUCATION/TRAINING PROGRAM

## 2022-09-25 PROCEDURE — 94640 AIRWAY INHALATION TREATMENT: CPT

## 2022-09-25 PROCEDURE — 99900026 HC AIRWAY MAINTENANCE (STAT)

## 2022-09-25 PROCEDURE — 36415 COLL VENOUS BLD VENIPUNCTURE: CPT | Performed by: HOSPITALIST

## 2022-09-25 PROCEDURE — 85025 COMPLETE CBC W/AUTO DIFF WBC: CPT | Performed by: HOSPITALIST

## 2022-09-25 PROCEDURE — 99233 SBSQ HOSP IP/OBS HIGH 50: CPT | Mod: ,,, | Performed by: STUDENT IN AN ORGANIZED HEALTH CARE EDUCATION/TRAINING PROGRAM

## 2022-09-25 PROCEDURE — 63600175 PHARM REV CODE 636 W HCPCS: Performed by: HOSPITALIST

## 2022-09-25 PROCEDURE — 99900035 HC TECH TIME PER 15 MIN (STAT)

## 2022-09-25 PROCEDURE — 99233 SBSQ HOSP IP/OBS HIGH 50: CPT | Mod: ,,, | Performed by: HOSPITALIST

## 2022-09-25 PROCEDURE — 99233 PR SUBSEQUENT HOSPITAL CARE,LEVL III: ICD-10-PCS | Mod: ,,, | Performed by: HOSPITALIST

## 2022-09-25 PROCEDURE — 94761 N-INVAS EAR/PLS OXIMETRY MLT: CPT

## 2022-09-25 PROCEDURE — 27000221 HC OXYGEN, UP TO 24 HOURS

## 2022-09-25 RX ORDER — HYDROXYZINE HYDROCHLORIDE 25 MG/1
25 TABLET, FILM COATED ORAL 3 TIMES DAILY PRN
Status: DISCONTINUED | OUTPATIENT
Start: 2022-09-25 | End: 2022-09-30 | Stop reason: HOSPADM

## 2022-09-25 RX ADMIN — ATORVASTATIN CALCIUM 20 MG: 20 TABLET, FILM COATED ORAL at 08:09

## 2022-09-25 RX ADMIN — PANTOPRAZOLE SODIUM 40 MG: 40 INJECTION, POWDER, FOR SOLUTION INTRAVENOUS at 08:09

## 2022-09-25 RX ADMIN — TOBRAMYCIN 300 MG: 300 SOLUTION RESPIRATORY (INHALATION) at 09:09

## 2022-09-25 RX ADMIN — Medication 6 MG: at 07:09

## 2022-09-25 RX ADMIN — HYDROXYZINE HYDROCHLORIDE 25 MG: 25 TABLET, FILM COATED ORAL at 07:09

## 2022-09-25 RX ADMIN — TOBRAMYCIN 300 MG: 300 SOLUTION RESPIRATORY (INHALATION) at 07:09

## 2022-09-25 RX ADMIN — LEVOFLOXACIN 750 MG: 750 TABLET, FILM COATED ORAL at 08:09

## 2022-09-25 RX ADMIN — GLYCOPYRROLATE 2 MG: 1 LIQUID ORAL at 06:09

## 2022-09-25 RX ADMIN — PREDNISONE 20 MG: 5 SOLUTION ORAL at 08:09

## 2022-09-25 NOTE — SUBJECTIVE & OBJECTIVE
Past Medical History:   Diagnosis Date    Cancer     skin to Rt forearm and face    COPD (chronic obstructive pulmonary disease)     Hyperlipidemia     Hypertension     Pseudoaneurysm        Past Surgical History:   Procedure Laterality Date    ABDOMINAL SURGERY      stents placed in liver and large intestines, per patient    CAROTID STENT      CORONARY STENT PLACEMENT  01/2000    DISSECTION OF NECK Bilateral 1/4/2022    Procedure: DISSECTION, NECK;  Surgeon: Naeem Smalls MD;  Location: [facility] OR [address] [place] [address];  Service: ENT;  Laterality: Bilateral;    ESOPHAGOGASTRODUODENOSCOPY W/ PEG N/A 1/4/2022    Procedure: EGD, WITH PEG TUBE INSERTION;  Surgeon: Anthony Calabrese MD;  Location: [facility] OR [address] [place] [address];  Service: General;  Laterality: N/A;    EYE SURGERY      Cataract bilateral    femoral stents      bilateral    FLAP PROCEDURE N/A 1/3/2022    Procedure: CREATION, FREE FLAP;  Surgeon: Naeem Smalls MD;  Location: [facility] OR [address] [place] [address];  Service: ENT;  Laterality: N/A;    FLAP PROCEDURE Right 1/4/2022    Procedure: CREATION, FREE FLAP;  Surgeon: Stacey Conde MD;  [facility];  Service: ENT;  Laterality: Right;  Ischemic start 1203  Ischemic stop 1353    GLOSSECTOMY N/A 1/4/2022    Procedure: GLOSSECTOMY;  Surgeon: Naeem Smalls MD;  Location: [facility] OR [address] [place] [address];  Service: ENT;  Laterality: N/A;    RADICAL NECK DISSECTION Left 1/3/2022    Procedure: DISSECTION, NECK, RADICAL;  Surgeon: Naeem Smalls MD;  Location: [facility] OR [address] [place] [address];  Service: ENT;  Laterality: Left;    SKIN CANCER EXCISION      TRACHEOTOMY N/A 1/4/2022    Procedure: TRACHEOTOMY;  Surgeon: Naeem Smalls MD;  Location: [facility] OR [address] [place] [address];  Service: ENT;  Laterality: N/A;       Review of patient's allergies indicates:  No Known Allergies  Family History    None       Tobacco Use    Smoking status: Former     Packs/day: 2.00     Years: 40.00     Pack years: 80.00     Types: Cigarettes     Start date: 4/17/1963     Quit date:  2018     Years since quittin.4    Smokeless tobacco: Never    Tobacco comments:     3/3 ppd x 40 yrs. Currently 3-4 cigarettes daily .He is trying  to quit. Is using a Vapor cigarettes  2-3 x's a day.   Substance and Sexual Activity    Alcohol use: Not Currently     Alcohol/week: 3.0 standard drinks     Types: 3 Cans of beer per week     Comment: beer daily 3-4    Drug use: No    Sexual activity: Not Currently     Review of Systems   Unable to perform ROS: Patient nonverbal   Objective:     Vital Signs (Most Recent):  Temp: 97.8 °F (36.6 °C) (22)  Pulse: 71 (22)  Resp: 20 (22)  BP: 104/61 (22)  SpO2: 99 % (22) Vital Signs (24h Range):  Temp:  [97 °F (36.1 °C)-98 °F (36.7 °C)] 97.8 °F (36.6 °C)  Pulse:  [70-86] 71  Resp:  [18-20] 20  SpO2:  [92 %-100 %] 99 %  BP: ()/(57-63) 104/61     Weight: 59.9 kg (132 lb) (22)  Body mass index is 23.38 kg/m².      Intake/Output Summary (Last 24 hours) at 2022 2206  Last data filed at 2022 1830  Gross per 24 hour   Intake 371.25 ml   Output 400 ml   Net -28.75 ml       Lines/Drains/Airways       Drain  Duration                  Gastrostomy/Enterostomy 22 Percutaneous endoscopic gastrostomy (PEG)  days              Airway  Duration                  Surgical Airway 22 1014 Uncuffed 152 days              Peripheral Intravenous Line  Duration                  Peripheral IV - Single Lumen 22 1531 18 G Anterior;Left;Proximal Forearm 8 days         Peripheral IV - Single Lumen 22 2036 20 G Distal;Posterior;Right Forearm 1 day                    Physical Exam  Constitutional:       Appearance: Normal appearance.   Eyes:      Conjunctiva/sclera: Conjunctivae normal.      Pupils: Pupils are equal, round, and reactive to light.   Neck:      Trachea: Tracheostomy present.   Cardiovascular:      Rate and Rhythm: Normal rate and regular rhythm.      Pulses: Normal pulses.       Heart sounds: Normal heart sounds.   Pulmonary:      Effort: Pulmonary effort is normal. No respiratory distress.      Breath sounds: Normal breath sounds.   Abdominal:      General: Bowel sounds are normal. There is no distension.      Palpations: Abdomen is soft.      Tenderness: There is no abdominal tenderness.      Comments: G-tube inserted cleanly in left upper quadrant; bumper rotates with ease. Mixture of brown and black colored stool on rectal exam.    Skin:     General: Skin is warm and dry.      Findings: No bruising or rash.   Neurological:      Mental Status: He is alert and oriented to person, place, and time.       Significant Labs:  All pertinent lab results from the last 24 hours have been reviewed.    Significant Imaging:  Imaging results within the past 24 hours have been reviewed.

## 2022-09-25 NOTE — ASSESSMENT & PLAN NOTE
· On 5L trach collar at home, but requiring 10L trach collar on admit, down to home 5L  · Likely 2/2 tracheitis and pneumonia  · Continue Prednisone taper from outpatient - 20mg daily x 7 days, then 10mg x  7 days  · Management as below

## 2022-09-25 NOTE — ASSESSMENT & PLAN NOTE
This is a 72 year old male with a PMH significant for squamous cell carcinoma of the tongue (status post total glossectomy and bilateral neck dissection with bilateral cervical facial advancement flaps with tracheostomy and PEG tube placement in 01/2022 and treatment with chemotherapy and radiation from 02/2022 to 04/2022), CAD (status post PCI in 2000), and PAD (status post bilateral femoral atherectomy and multiple lower extremity interventions last in 2018 and on ASA and PLAVIX; last dose on 09/22) who presented to [facility] on 09/23 with symptomatic anemia associated with evidence of GI bleeding (melena) and acute on chronic respiratory failure secondary to tracheitis versus pneumonia. He is hemodynamically stable currently.     Our recommendations are as follows:      Differential diagnoses for melena on presentation include possible irritation from G-tube site versus AVM's.    Maintain IV access with at least 2 large IVs (18 gauge or larger)   Continued IVF resuscitation as tolerated with attention to active co-morbidities.    Give PROTONIX 40 mg IV BID.    RBC transfusion as indicated if Hgb <7 g/dL.    Please correct any coagulopathy with platelets and FFP to a goal of platelets >50K and INR <2.0.    Discontinue all antiplatelet, anti-coagulation, and NSAID products.    Will tentatively plan for EGD and colonoscopy (given lack of prior colon cancer screening) for further evaluation on 09/27/2022 to allow adequate time for PLAVIX to washout of system. Okay to continue tube feedings until evening of 09/26. May need ENT clearance to pass EGD.

## 2022-09-25 NOTE — PROGRESS NOTES
[facility] - Telemetry [facility] Medicine  Progress Note    Patient Name: Fareed Richard Jr.  MRN: [id number]  Patient Class: IP- Inpatient   Admission Date: 9/23/2022  Length of Stay: 2 days  Attending Physician: Brenda Saldivar MD  Primary Care Provider: Kodi Tubbs MD        Subjective:     Principal Problem:Acute on chronic respiratory failure        HPI:  Mr. Fareed Richard Jr. is a 72 y.o. 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  History is obtained from wife at bedside.  She reports that at baseline, he is on 5L trach collar.  However, for the last 1-2 weeks he has been having worsening respiratory issues.  He is currently on a prolonged Prednisone taper.  He went to the [facility] [place] ER on 9/14 for hemoptysis and bloody trach secretions.  Hemoglobin was 8.4.  At that encounter, he was evaluated by ENT who felt like his airway was patent.  His hemoptysis stopped before leaving the ER.  He was discharged with Augmentin for aspiration pneumonia seen on CT scan.  However, on the day of admission he complained of chest pain, shortness of breath, and worsening fatigue.  Wife also endorsed yellow/green and foul colored trach secretions, but no fever or chills.  She suctioned him, which did not help - so she had to increase his home oxygen from 5L to 10L.  EMS was called and suctioned him, getting his oxygen saturations to 100% on 5L NC.  He denies any dark stools to his knowledge, but is on Aspirin and Plavix.  Chart review showed multiple respiratory cultures with Pseudomonas, unclear if colonization.    Upon arrival to the ER, vitals were temp 98.2F, HR 87, /78 satting 100% on 10L trach collar, 60% FiO2.  Labs showed Hemoglobin 5.5 and .  CXR showed pulmonary vascular and interstitial markings with effusions, no  clear consolidation.  HAYLEE was positive for melena.  He was given 1L NS, Vanc, Zosyn.  2 units PRBCs were ordered and he was given IV PPI.  He was admitted to Hospital Medicine for further management.      Overview/Hospital Course:  Mr. Richard was admitted to Hospital Medicine for management of acute on chronic respiratory failure 2/2 tracheitis/pneumonia, as well as a GIB.  He was started transfused 2 units PRBCs, started on IV PPI and GI was consulted.  He was started on Zosyn and Tobramycin nebs for pneumonia/tracheitis.  Resp cx returned with GNR.  ID was consulted.  He was changed from Zosyn to Levaquin given the high AMRITA.  GI plans for EGD 9/27, might require ENT clearance for trach.      Interval History: No acute events overnight.  Resp cx with GNR. ID changed Zosyn to Levaquin given high AMRITA.  Weaned down to home 5L trach collar.  Bleeding stable.  GI plans for EGD 9/27 to allow for Plavix washout, might need ENT clearance for trach.    Review of Systems   Constitutional:  Negative for chills, fatigue and fever.   Respiratory:  Positive for cough and shortness of breath.    Cardiovascular:  Negative for chest pain, palpitations and leg swelling.   Gastrointestinal:  Negative for abdominal pain, diarrhea, nausea and vomiting.   Genitourinary:  Negative for dysuria and urgency.   Neurological:  Negative for dizziness and headaches.   All other systems reviewed and are negative.  Objective:     Vital Signs (Most Recent):  Temp: 97.1 °F (36.2 °C) (09/25/22 0811)  Pulse: 81 (09/25/22 0811)  Resp: 18 (09/25/22 0811)  BP: 100/62 (09/25/22 0811)  SpO2: (!) 94 % (09/25/22 0811)   Vital Signs (24h Range):  Temp:  [97.1 °F (36.2 °C)-98.1 °F (36.7 °C)] 97.1 °F (36.2 °C)  Pulse:  [6-86] 81  Resp:  [18-20] 18  SpO2:  [92 %-100 %] 94 %  BP: (100-116)/(59-63) 100/62     Weight: 59.9 kg (132 lb)  Body mass index is 23.38 kg/m².    Intake/Output Summary (Last 24 hours) at 9/25/2022 1032  Last data filed at 9/25/2022  0820  Gross per 24 hour   Intake 290 ml   Output 200 ml   Net 90 ml        Physical Exam  Constitutional:       Appearance: Normal appearance. He is well-developed.   HENT:      Head: Normocephalic and atraumatic.   Neck:      Comments: Trach  Cardiovascular:      Rate and Rhythm: Normal rate and regular rhythm.      Heart sounds: No murmur heard.  Pulmonary:      Effort: Pulmonary effort is normal. No respiratory distress.      Breath sounds: Normal breath sounds. No wheezing or rales.   Abdominal:      General: There is no distension.      Palpations: Abdomen is soft.      Tenderness: There is no abdominal tenderness.      Comments: Peg   Musculoskeletal:         General: No deformity.   Skin:     General: Skin is warm.      Coloration: Skin is pale.   Neurological:      General: No focal deficit present.      Mental Status: He is alert and oriented to person, place, and time. Mental status is at baseline.       Significant Labs: All pertinent labs within the past 24 hours have been reviewed.  CBC:   Recent Labs   Lab 09/23/22  1247 09/24/22  0253 09/25/22  0254   WBC 7.74 8.55 7.16   HGB 5.5* 7.8* 8.1*   HCT 19.4* 25.7* 27.1*    272 299       Respiratory Culture:   Recent Labs   Lab 09/23/22  1451   GSRESP Rare WBC's  Moderate Gram negative rods   RESPIRATORYC GRAM NEGATIVE CRISTINA  Few  Identification and susceptibility pending  *         Significant Imaging: I have reviewed all pertinent imaging results/findings within the past 24 hours.      Assessment/Plan:      * Acute on chronic respiratory failure  · On 5L trach collar at home, but requiring 10L trach collar on admit, down to home 5L  · Likely 2/2 tracheitis and pneumonia  · Continue Prednisone taper from outpatient - 20mg daily x 7 days, then 10mg x  7 days  · Management as below    Pneumonia  · As above      Acute tracheitis  · Recently with hemoptysis and now yellow/green and foul smelling secretions from his trach and rhonchi - recent CT chest 9/14  with pneumonia  · Trach culture with GNR.  Previous resp cx with Pseudomonas, unclear if colonization  · Started on Zosyn 9/23, changed to Levaquin by ID 9/24 because of high AMRITA.  Plan for 7 days (end date 10/1)  · Continue Tobra nebs  · Can consider ENT eval      Gastrointestinal hemorrhage with melena  · GIB Pathway initiated  · Hgb 5.5 on admit, baseline around 8 as of 2 weeks ago - now stable s/p 2 units PRBCs  · Continue Protonix 40mg IV BID  · GI consulted, appreciate assistance  · Plan for EGD 9/27 after Plavix washout, might need ENT clearance for trach  · Transfuse for Hemoglobin <7        Tracheostomy present  · On 5L trach collar at home      Acute blood loss anemia  · See Melena      Elevated brain natriuretic peptide (BNP) level  · BNP newly elevated at 468  · 2D echo with EF 55% and G1DD      PEG (percutaneous endoscopic gastrostomy) status  · See Dysphagia      Dysphagia  · S/p peg  · On TF - Hold for now until GI eval      Other hyperlipidemia  · Chronic and stable  · Continue Statin      Primary hypertension  · Chronic issue  · Hold Metoprolol with GIB and softer BP      Coronary artery disease involving native coronary artery of native heart without angina pectoris  · Chronic and stable  · Hold Aspirin and Plavix with GIB  · Continue Statin  · Hold Metoprolol with softer BP      Squamous cell cancer of tongue  · S/p chemo, radiation  · Now with flap, trach, and peg    Peripheral vascular disease  · Chronic issue  · Hold Aspirin and Plavix with GIB  · Continue Statin        VTE Risk Mitigation (From admission, onward)         Ordered     IP VTE HIGH RISK PATIENT  Once         09/23/22 1409     Place sequential compression device  Until discontinued         09/23/22 1408                Discharge Planning   NISA: 9/28/2022     Code Status: Full Code   Is the patient medically ready for discharge?: No    Reason for patient still in hospital (select all that apply): Patient trending condition                      Brenda Saldivar MD  Department of Hospital Medicine   [facility] - Telemetry Stepdown

## 2022-09-25 NOTE — PROGRESS NOTES
[facility] - Telemetry Stepdown  Infectious Disease  Progress Note    Patient Name: Fareed Richard Jr.  MRN: [id number]  Admission Date: 9/23/2022  Length of Stay: 2 days  Attending Physician: Brenda Saldivar MD  Primary Care Provider: Kodi Tubbs MD    Isolation Status: No active isolations  Assessment/Plan:      * Acute on chronic respiratory failure  71 y/o M trach dependent presenting with acute on chronic respiratory failure requiring increase in supplemental O2. patient with multiple hospitalizations in the past for Pseudomonas and Citrobacter PNA. Resp culture collected on admission positive for GNRs. CXR with some mild patchy RML markings, however overall CXR fairly unremarkable. Resp failure likely 2/2 aspiration pneumonitis vs recurrent PNA.  Decreased secretions noted. . Discussed potential adverse reactions of abx with patient.     Recommendations:  -continue Levofloxacin 750mg q24h via PEG for pseudomonal coverage given previous susceptibilities, anticipate 7d total   -discussed with pt importance of holding tube feeds before/after abx administration  -if sputum cx are resistant to FQ, can reach back out      Pneumonia  See respiratory failure    Acute tracheitis  See respiratory failure          Thank you for your consult. I will sign off. Please contact us if you have any additional questions. Above d/w primary team.       Time: 35 minutes   50% of time spent on face-to-face counseling and coordination of care. Counseling included review of test results, diagnosis, and treatment plan with patient and/or family.        Beatriz Sousa MD  Infectious Disease  [facility] - Telemetry Stepdown    Subjective:     Principal Problem:Acute on chronic respiratory failure    HPI: Mr. Fareed Richard is a 71 y/o M with hx of tobacco abuse c/b SCC of the tongue, s/p total glossectomy and flap with subsequent trach and peg, CAD, PVD on DAPT who presented to [facility] ED on 9/23 with complaint of worsening SOB.  Patient has been having respiratory difficulty for the past 2 weeks. He first presented to [facility] ED on 9/14 for hemoptysis and bloody secretions from his trach. Hgb at that time noted to be 8.4. CTA chest with concerns for aspiration. ENT evaluated patient and felt his airway was patent. Patient discharged to home on course of augmentin for aspiration PNA. However, since that time he has developed SOB, chest pain, and fatigue. No fever or chills. On day of presentation, pt hypoxic requiring increase from BL 5L O2 up to 10L, with associated green and yellow secretions from trach. Of note, patient has had multiple respiratory infections requiring admission in the past several months, and resp cultures have grown pseudomonas and Citrobacter. In the ED, patient efebrile, HDS, satting 100% on 10L O2. Hgb 5.5. CXR with some patchy increased markings in R mid lung fields. Patient given 2u pRBC and admitted to hospital medicine for continued management. ID consulted given hx of previous respiratory cultures and trach.     Interval History: No fevers documented overnight. Pt reported feeling better, less secretions from trach. Tolerating abx without issues. Plan for EGD next week.       Review of Systems   Constitutional:  Negative for chills and fever.   All other systems reviewed and are negative.  Objective:     Vital Signs (Most Recent):  Temp: 97.1 °F (36.2 °C) (09/25/22 0811)  Pulse: 81 (09/25/22 0811)  Resp: 18 (09/25/22 0811)  BP: 100/62 (09/25/22 0811)  SpO2: (!) 94 % (09/25/22 0811)   Vital Signs (24h Range):  Temp:  [97.1 °F (36.2 °C)-98.1 °F (36.7 °C)] 97.1 °F (36.2 °C)  Pulse:  [6-86] 81  Resp:  [18-20] 18  SpO2:  [92 %-100 %] 94 %  BP: (100-116)/(59-63) 100/62     Weight: 59.9 kg (132 lb)  Body mass index is 23.38 kg/m².    Estimated Creatinine Clearance: 75.6 mL/min (based on SCr of 0.7 mg/dL).    Physical Exam  Constitutional:       General: He is not in acute distress.     Appearance: He is not ill-appearing  or toxic-appearing.   HENT:      Head: Normocephalic and atraumatic.      Right Ear: External ear normal.      Left Ear: External ear normal.      Mouth/Throat:      Mouth: Mucous membranes are dry.   Eyes:      General: No scleral icterus.        Right eye: No discharge.         Left eye: No discharge.   Neck:      Comments: Trach - no drainage  Cardiovascular:      Rate and Rhythm: Normal rate and regular rhythm.   Pulmonary:      Effort: Pulmonary effort is normal. No respiratory distress.      Breath sounds: Rhonchi (trace) present. No wheezing.   Abdominal:      General: There is no distension.      Palpations: Abdomen is soft.      Tenderness: There is no abdominal tenderness. There is no guarding.      Comments: Peg    Musculoskeletal:      Right lower leg: No edema.      Left lower leg: No edema.   Skin:     General: Skin is warm and dry.      Coloration: Skin is not jaundiced.      Findings: No erythema or rash.   Neurological:      Mental Status: He is alert and oriented to person, place, and time. Mental status is at baseline.      Motor: No weakness.       Significant Labs:   Microbiology Results (last 7 days)       Procedure Component Value Units Date/Time    Blood culture #2 **CANNOT BE ORDERED STAT** [[id number]] Collected: 09/23/22 1300    Order Status: Completed Specimen: Blood from Peripheral, Forearm, Left Updated: 09/24/22 1412     Blood Culture, Routine No Growth to date      No Growth to date    Blood culture #1 **CANNOT BE ORDERED STAT** [[id number]] Collected: 09/23/22 1248    Order Status: Completed Specimen: Blood from Peripheral, Forearm, Right Updated: 09/24/22 1412     Blood Culture, Routine No Growth to date      No Growth to date    Culture, Respiratory with Gram Stain [[id number]]  (Abnormal) Collected: 09/23/22 1451    Order Status: Completed Specimen: Endotracheal from Tracheal Aspirate Updated: 09/24/22 0656     Respiratory Culture GRAM NEGATIVE CRISTINA  Few  Identification and  susceptibility pending       Gram Stain (Respiratory) Rare WBC's     Gram Stain (Respiratory) Moderate Gram negative rods            Significant Imaging: I have reviewed all pertinent imaging results/findings within the past 24 hours.

## 2022-09-25 NOTE — ASSESSMENT & PLAN NOTE
71 y/o M trach dependent presenting with acute on chronic respiratory failure requiring increase in supplemental O2. patient with multiple hospitalizations in the past for Pseudomonas and Citrobacter PNA. Resp culture collected on admission positive for GNRs. CXR with some mild patchy RML markings, however overall CXR fairly unremarkable. Resp failure likely 2/2 aspiration pneumonitis vs recurrent PNA.  Decreased secretions noted. .     Recommendations:  -continue Levofloxacin 750mg q24h via PEG for pseudomonal coverage given previous susceptibilities, anticipate 7d total   -discussed with pt importance of holding tube feeds before/after abx administration  -if sputum cx are resistant to FQ, can reach back out

## 2022-09-25 NOTE — HPI
Mr. Fareed Richard Jr. is a 72 year old male for whom GI is consulted with concern for GI bleeding. He has a PMH significant for squamous cell carcinoma of the tongue (status post total glossectomy and bilateral neck dissection with bilateral cervical facial advancement flaps with tracheostomy and PEG tube placement in 01/2022 and treatment with chemotherapy and radiation from 02/2022 to 04/2022), CAD (status post PCI in 2000), and PAD (status post bilateral femoral atherectomy and multiple lower extremity interventions last in 2018 and on ASA and PLAVIX; last dose on 09/22). History obtained mostly from patient's wife due to being non-verbal.     Patient's wife reports patient experiencing approximately 4-5 days of increasing shortness of breath  associated with fatigue and generalized weakness. His wife reports symptoms preceded by episode of bloody secretions from tracheostomy site on 09/14; on chart review, he was evaluated at ED here for symptom at that time, but declined laryngoscopy; bleeding noted to resolve prior to discharge from ED without ENT intervention. Patient's wife reports patient does not look at the color of his bowel movements, does not use NSAID products, and has never undergone EGD or colonoscopy previously. She denies noticing bloody output or leakage from G-tube. Due to symptoms, he reported to ED here on 09/23/2022.     On arrival, vital signs stable on tracheostomy collar. Labs notable for anemia (Hgb 5.5 from 8.4 on 09/14), normal platelets, and elevated BUN (30). He was noted to have evidence of thick secretions from tracheostomy collar with respiratory cultures positive for GNR's, however no focal consolidation on CXR. He was admitted to hospital medicine, given antibiotics and PRBC, and GI and ID consulted.

## 2022-09-25 NOTE — ASSESSMENT & PLAN NOTE
· Recently with hemoptysis and now yellow/green and foul smelling secretions from his trach and rhonchi - recent CT chest 9/14 with pneumonia  · Trach culture with GNR.  Previous resp cx with Pseudomonas, unclear if colonization  · Started on Zosyn 9/23, changed to Levaquin by ID 9/24 because of high AMRITA.  Plan for 7 days (end date 10/1)  · Continue Tobra nebs  · Can consider ENT eval

## 2022-09-25 NOTE — ASSESSMENT & PLAN NOTE
· GIB Pathway initiated  · Hgb 5.5 on admit, baseline around 8 as of 2 weeks ago - now stable s/p 2 units PRBCs  · Continue Protonix 40mg IV BID  · GI consulted, appreciate assistance  · Plan for EGD 9/27 after Plavix washout, might need ENT clearance for trach  · Transfuse for Hemoglobin <7

## 2022-09-25 NOTE — SUBJECTIVE & OBJECTIVE
Interval History: No fevers documented overnight. Pt reported feeling better, less secretions from trach. Tolerating abx without issues. Plan for EGD next week.       Review of Systems   Constitutional:  Negative for chills and fever.   All other systems reviewed and are negative.  Objective:     Vital Signs (Most Recent):  Temp: 97.1 °F (36.2 °C) (09/25/22 0811)  Pulse: 81 (09/25/22 0811)  Resp: 18 (09/25/22 0811)  BP: 100/62 (09/25/22 0811)  SpO2: (!) 94 % (09/25/22 0811)   Vital Signs (24h Range):  Temp:  [97.1 °F (36.2 °C)-98.1 °F (36.7 °C)] 97.1 °F (36.2 °C)  Pulse:  [6-86] 81  Resp:  [18-20] 18  SpO2:  [92 %-100 %] 94 %  BP: (100-116)/(59-63) 100/62     Weight: 59.9 kg (132 lb)  Body mass index is 23.38 kg/m².    Estimated Creatinine Clearance: 75.6 mL/min (based on SCr of 0.7 mg/dL).    Physical Exam  Constitutional:       General: He is not in acute distress.     Appearance: He is not ill-appearing or toxic-appearing.   HENT:      Head: Normocephalic and atraumatic.      Right Ear: External ear normal.      Left Ear: External ear normal.      Mouth/Throat:      Mouth: Mucous membranes are dry.   Eyes:      General: No scleral icterus.        Right eye: No discharge.         Left eye: No discharge.   Neck:      Comments: Trach - no drainage  Cardiovascular:      Rate and Rhythm: Normal rate and regular rhythm.   Pulmonary:      Effort: Pulmonary effort is normal. No respiratory distress.      Breath sounds: Rhonchi (trace) present. No wheezing.   Abdominal:      General: There is no distension.      Palpations: Abdomen is soft.      Tenderness: There is no abdominal tenderness. There is no guarding.      Comments: Peg    Musculoskeletal:      Right lower leg: No edema.      Left lower leg: No edema.   Skin:     General: Skin is warm and dry.      Coloration: Skin is not jaundiced.      Findings: No erythema or rash.   Neurological:      Mental Status: He is alert and oriented to person, place, and time.  Mental status is at baseline.      Motor: No weakness.       Significant Labs:   Microbiology Results (last 7 days)       Procedure Component Value Units Date/Time    Blood culture #2 **CANNOT BE ORDERED STAT** [[id number]] Collected: 09/23/22 1300    Order Status: Completed Specimen: Blood from Peripheral, Forearm, Left Updated: 09/24/22 1412     Blood Culture, Routine No Growth to date      No Growth to date    Blood culture #1 **CANNOT BE ORDERED STAT** [[id number]] Collected: 09/23/22 1248    Order Status: Completed Specimen: Blood from Peripheral, Forearm, Right Updated: 09/24/22 1412     Blood Culture, Routine No Growth to date      No Growth to date    Culture, Respiratory with Gram Stain [[id number]]  (Abnormal) Collected: 09/23/22 1451    Order Status: Completed Specimen: Endotracheal from Tracheal Aspirate Updated: 09/24/22 0656     Respiratory Culture GRAM NEGATIVE CRISTINA  Few  Identification and susceptibility pending       Gram Stain (Respiratory) Rare WBC's     Gram Stain (Respiratory) Moderate Gram negative rods            Significant Imaging: I have reviewed all pertinent imaging results/findings within the past 24 hours.

## 2022-09-25 NOTE — SUBJECTIVE & OBJECTIVE
Interval History: No acute events overnight.  Resp cx with GNR. ID changed Zosyn to Levaquin given high AMRITA.  Weaned down to home 5L trach collar.  Bleeding stable.  GI plans for EGD 9/27 to allow for Plavix washout, might need ENT clearance for trach.    Review of Systems   Constitutional:  Negative for chills, fatigue and fever.   Respiratory:  Positive for cough and shortness of breath.    Cardiovascular:  Negative for chest pain, palpitations and leg swelling.   Gastrointestinal:  Negative for abdominal pain, diarrhea, nausea and vomiting.   Genitourinary:  Negative for dysuria and urgency.   Neurological:  Negative for dizziness and headaches.   All other systems reviewed and are negative.  Objective:     Vital Signs (Most Recent):  Temp: 97.1 °F (36.2 °C) (09/25/22 0811)  Pulse: 81 (09/25/22 0811)  Resp: 18 (09/25/22 0811)  BP: 100/62 (09/25/22 0811)  SpO2: (!) 94 % (09/25/22 0811)   Vital Signs (24h Range):  Temp:  [97.1 °F (36.2 °C)-98.1 °F (36.7 °C)] 97.1 °F (36.2 °C)  Pulse:  [6-86] 81  Resp:  [18-20] 18  SpO2:  [92 %-100 %] 94 %  BP: (100-116)/(59-63) 100/62     Weight: 59.9 kg (132 lb)  Body mass index is 23.38 kg/m².    Intake/Output Summary (Last 24 hours) at 9/25/2022 1032  Last data filed at 9/25/2022 0820  Gross per 24 hour   Intake 290 ml   Output 200 ml   Net 90 ml        Physical Exam  Constitutional:       Appearance: Normal appearance. He is well-developed.   HENT:      Head: Normocephalic and atraumatic.   Neck:      Comments: Trach  Cardiovascular:      Rate and Rhythm: Normal rate and regular rhythm.      Heart sounds: No murmur heard.  Pulmonary:      Effort: Pulmonary effort is normal. No respiratory distress.      Breath sounds: Normal breath sounds. No wheezing or rales.   Abdominal:      General: There is no distension.      Palpations: Abdomen is soft.      Tenderness: There is no abdominal tenderness.      Comments: Peg   Musculoskeletal:         General: No deformity.   Skin:      General: Skin is warm.      Coloration: Skin is pale.   Neurological:      General: No focal deficit present.      Mental Status: He is alert and oriented to person, place, and time. Mental status is at baseline.       Significant Labs: All pertinent labs within the past 24 hours have been reviewed.  CBC:   Recent Labs   Lab 09/23/22  1247 09/24/22  0253 09/25/22  0254   WBC 7.74 8.55 7.16   HGB 5.5* 7.8* 8.1*   HCT 19.4* 25.7* 27.1*    272 299       Respiratory Culture:   Recent Labs   Lab 09/23/22  1451   GSRESP Rare WBC's  Moderate Gram negative rods   RESPIRATORYC GRAM NEGATIVE CRISTINA  Few  Identification and susceptibility pending  *         Significant Imaging: I have reviewed all pertinent imaging results/findings within the past 24 hours.

## 2022-09-25 NOTE — CONSULTS
[facility] - Telemetry Stepdown  Gastroenterology  Consult Note    Patient Name: Fareed Richard Jr.  MRN: [id number]  Admission Date: 9/23/2022  Hospital Length of Stay: 1 days  Code Status: Full Code   Attending Provider: Brenda Saldivar MD   Consulting Provider: Shaun Thomas MD  Primary Care Physician: Kodi Tubbs MD  Principal Problem:Acute on chronic respiratory failure    Inpatient consult to Gastroenterology  Consult performed by: Shaun Thomas MD  Consult ordered by: Kirsten Graham MD        Subjective:     HPI:  Mr. Fareed Richard Jr. is a 72 year old male for whom GI is consulted with concern for GI bleeding. He has a PMH significant for squamous cell carcinoma of the tongue (status post total glossectomy and bilateral neck dissection with bilateral cervical facial advancement flaps with tracheostomy and PEG tube placement in 01/2022 and treatment with chemotherapy and radiation from 02/2022 to 04/2022), CAD (status post PCI in 2000), and PAD (status post bilateral femoral atherectomy and multiple lower extremity interventions last in 2018 and on ASA and PLAVIX; last dose on 09/22). History obtained mostly from patient's wife due to being non-verbal.     Patient's wife reports patient experiencing approximately 4-5 days of increasing shortness of breath  associated with fatigue and generalized weakness. His wife reports symptoms preceded by episode of bloody secretions from tracheostomy site on 09/14; on chart review, he was evaluated at ED here for symptom at that time, but declined laryngoscopy; bleeding noted to resolve prior to discharge from ED without ENT intervention. Patient's wife reports patient does not look at the color of his bowel movements, does not use NSAID products, and has never undergone EGD or colonoscopy previously. She denies noticing bloody output or leakage from G-tube. Due to symptoms, he reported to ED here on 09/23/2022.     On arrival, vital signs stable on  tracheostomy collar. Labs notable for anemia (Hgb 5.5 from 8.4 on 09/14), normal platelets, and elevated BUN (30). He was noted to have evidence of thick secretions from tracheostomy collar with respiratory cultures positive for GNR's, however no focal consolidation on CXR. He was admitted to hospital medicine, given antibiotics and PRBC, and GI and ID consulted.       Past Medical History:   Diagnosis Date    Cancer     skin to Rt forearm and face    COPD (chronic obstructive pulmonary disease)     Hyperlipidemia     Hypertension     Pseudoaneurysm        Past Surgical History:   Procedure Laterality Date    ABDOMINAL SURGERY      stents placed in liver and large intestines, per patient    CAROTID STENT      CORONARY STENT PLACEMENT  01/2000    DISSECTION OF NECK Bilateral 1/4/2022    Procedure: DISSECTION, NECK;  Surgeon: Naeem Smalls MD;  Location: [facility] OR [address] [place] [address];  Service: ENT;  Laterality: Bilateral;    ESOPHAGOGASTRODUODENOSCOPY W/ PEG N/A 1/4/2022    Procedure: EGD, WITH PEG TUBE INSERTION;  Surgeon: Anthony Calabrese MD;  Location: [facility] OR [address] [place] [address];  Service: General;  Laterality: N/A;    EYE SURGERY      Cataract bilateral    femoral stents      bilateral    FLAP PROCEDURE N/A 1/3/2022    Procedure: CREATION, FREE FLAP;  Surgeon: Naeem Smalls MD;  [facility];  Service: ENT;  Laterality: N/A;    FLAP PROCEDURE Right 1/4/2022    Procedure: CREATION, FREE FLAP;  Surgeon: Stacey Conde MD;  Location: [facility] OR [address] [place] [address];  Service: ENT;  Laterality: Right;  Ischemic start 1203  Ischemic stop 1353    GLOSSECTOMY N/A 1/4/2022    Procedure: GLOSSECTOMY;  Surgeon: Naeem Smalls MD;  [facility];  Service: ENT;  Laterality: N/A;    RADICAL NECK DISSECTION Left 1/3/2022    Procedure: DISSECTION, NECK, RADICAL;  Surgeon: Naeem Smalls MD;  Location: [facility] OR [address] [place] [address];  Service: ENT;  Laterality: Left;    SKIN CANCER EXCISION      TRACHEOTOMY N/A  2022    Procedure: TRACHEOTOMY;  Surgeon: Naeem Smalls MD;  Location: [facility] OR [address] [place] [address];  Service: ENT;  Laterality: N/A;       Review of patient's allergies indicates:  No Known Allergies  Family History    None       Tobacco Use    Smoking status: Former     Packs/day: 2.00     Years: 40.00     Pack years: 80.00     Types: Cigarettes     Start date: 1963     Quit date: 2018     Years since quittin.4    Smokeless tobacco: Never    Tobacco comments:     3/3 ppd x 40 yrs. Currently 3-4 cigarettes daily .He is trying  to quit. Is using a Vapor cigarettes  2-3 x's a day.   Substance and Sexual Activity    Alcohol use: Not Currently     Alcohol/week: 3.0 standard drinks     Types: 3 Cans of beer per week     Comment: beer daily 3-4    Drug use: No    Sexual activity: Not Currently     Review of Systems   Unable to perform ROS: Patient nonverbal   Objective:     Vital Signs (Most Recent):  Temp: 97.8 °F (36.6 °C) (22 1535)  Pulse: 71 (22)  Resp: 20 (22)  BP: 104/61 (22)  SpO2: 99 % (22) Vital Signs (24h Range):  Temp:  [97 °F (36.1 °C)-98 °F (36.7 °C)] 97.8 °F (36.6 °C)  Pulse:  [70-86] 71  Resp:  [18-20] 20  SpO2:  [92 %-100 %] 99 %  BP: ()/(57-63) 104/61     Weight: 59.9 kg (132 lb) (22)  Body mass index is 23.38 kg/m².      Intake/Output Summary (Last 24 hours) at [address]226  Last data filed at 2022 1830  Gross per 24 hour   Intake 371.25 ml   Output 400 ml   Net -28.75 ml       Lines/Drains/Airways       Drain  Duration                  Gastrostomy/Enterostomy 22 Percutaneous endoscopic gastrostomy (PEG)  days              Airway  Duration                  Surgical Airway 22 1014 Uncuffed 152 days              Peripheral Intravenous Line  Duration                  Peripheral IV - Single Lumen 22 1531 18 G Anterior;Left;Proximal Forearm 8 days         Peripheral IV - Single Lumen 22  2036 20 G Distal;Posterior;Right Forearm 1 day                    Physical Exam  Constitutional:       Appearance: Normal appearance.   Eyes:      Conjunctiva/sclera: Conjunctivae normal.      Pupils: Pupils are equal, round, and reactive to light.   Neck:      Trachea: Tracheostomy present.   Cardiovascular:      Rate and Rhythm: Normal rate and regular rhythm.      Pulses: Normal pulses.      Heart sounds: Normal heart sounds.   Pulmonary:      Effort: Pulmonary effort is normal. No respiratory distress.      Breath sounds: Normal breath sounds.   Abdominal:      General: Bowel sounds are normal. There is no distension.      Palpations: Abdomen is soft.      Tenderness: There is no abdominal tenderness.      Comments: G-tube inserted cleanly in left upper quadrant; bumper rotates with ease. Mixture of brown and black colored stool on rectal exam.    Skin:     General: Skin is warm and dry.      Findings: No bruising or rash.   Neurological:      Mental Status: He is alert and oriented to person, place, and time.       Significant Labs:  All pertinent lab results from the last 24 hours have been reviewed.    Significant Imaging:  Imaging results within the past 24 hours have been reviewed.    Assessment/Plan:     Gastrointestinal hemorrhage with melena  This is a 72 year old male with a PMH significant for squamous cell carcinoma of the tongue (status post total glossectomy and bilateral neck dissection with bilateral cervical facial advancement flaps with tracheostomy and PEG tube placement in 01/2022 and treatment with chemotherapy and radiation from 02/2022 to 04/2022), CAD (status post PCI in 2000), and PAD (status post bilateral femoral atherectomy and multiple lower extremity interventions last in 2018 and on ASA and PLAVIX; last dose on 09/22) who presented to [facility] on 09/23 with symptomatic anemia associated with evidence of GI bleeding (melena) and acute on chronic respiratory failure secondary to  tracheitis versus pneumonia. He is hemodynamically stable currently.     Our recommendations are as follows:      Differential diagnoses for melena on presentation include possible irritation from G-tube site versus AVM's.    Maintain IV access with at least 2 large IVs (18 gauge or larger)   Continued IVF resuscitation as tolerated with attention to active co-morbidities.    Give PROTONIX 40 mg IV BID.    RBC transfusion as indicated if Hgb <7 g/dL.    Please correct any coagulopathy with platelets and FFP to a goal of platelets >50K and INR <2.0.    Discontinue all antiplatelet, anti-coagulation, and NSAID products.    Will tentatively plan for EGD and colonoscopy (given lack of prior colon cancer screening) for further evaluation on 09/27/2022 to allow adequate time for PLAVIX to washout of system. Okay to continue tube feedings until evening of 09/26. May need ENT clearance to pass EGD.             Thank you for your consult. I will follow-up with patient. Please contact us if you have any additional questions.    Shaun Thomas MD  Gastroenterology  [facility] - Telemetry Stepdown

## 2022-09-26 ENCOUNTER — ANESTHESIA EVENT (OUTPATIENT)
Dept: ENDOSCOPY | Facility: HOSPITAL | Age: 72
DRG: 205 | End: 2022-09-26
Payer: MEDICARE

## 2022-09-26 LAB
ANISOCYTOSIS BLD QL SMEAR: SLIGHT
BACTERIA SPEC AEROBE CULT: ABNORMAL
BASOPHILS # BLD AUTO: 0.02 K/UL (ref 0–0.2)
BASOPHILS NFR BLD: 0.3 % (ref 0–1.9)
DIFFERENTIAL METHOD: ABNORMAL
EOSINOPHIL # BLD AUTO: 0.4 K/UL (ref 0–0.5)
EOSINOPHIL NFR BLD: 6 % (ref 0–8)
ERYTHROCYTE [DISTWIDTH] IN BLOOD BY AUTOMATED COUNT: 16.5 % (ref 11.5–14.5)
GRAM STN SPEC: ABNORMAL
GRAM STN SPEC: ABNORMAL
HCT VFR BLD AUTO: 29.2 % (ref 40–54)
HGB BLD-MCNC: 9 G/DL (ref 14–18)
HYPOCHROMIA BLD QL SMEAR: ABNORMAL
IMM GRANULOCYTES # BLD AUTO: 0.09 K/UL (ref 0–0.04)
IMM GRANULOCYTES NFR BLD AUTO: 1.2 % (ref 0–0.5)
LYMPHOCYTES # BLD AUTO: 0.5 K/UL (ref 1–4.8)
LYMPHOCYTES NFR BLD: 7.3 % (ref 18–48)
MCH RBC QN AUTO: 26.5 PG (ref 27–31)
MCHC RBC AUTO-ENTMCNC: 30.8 G/DL (ref 32–36)
MCV RBC AUTO: 86 FL (ref 82–98)
MONOCYTES # BLD AUTO: 0.7 K/UL (ref 0.3–1)
MONOCYTES NFR BLD: 9.5 % (ref 4–15)
NEUTROPHILS # BLD AUTO: 5.6 K/UL (ref 1.8–7.7)
NEUTROPHILS NFR BLD: 75.7 % (ref 38–73)
NRBC BLD-RTO: 0 /100 WBC
OVALOCYTES BLD QL SMEAR: ABNORMAL
PLATELET # BLD AUTO: 343 K/UL (ref 150–450)
PMV BLD AUTO: ABNORMAL FL (ref 9.2–12.9)
POIKILOCYTOSIS BLD QL SMEAR: SLIGHT
POLYCHROMASIA BLD QL SMEAR: ABNORMAL
RBC # BLD AUTO: 3.39 M/UL (ref 4.6–6.2)
SPHEROCYTES BLD QL SMEAR: ABNORMAL
WBC # BLD AUTO: 7.35 K/UL (ref 3.9–12.7)

## 2022-09-26 PROCEDURE — 25000242 PHARM REV CODE 250 ALT 637 W/ HCPCS: Performed by: HOSPITALIST

## 2022-09-26 PROCEDURE — 94640 AIRWAY INHALATION TREATMENT: CPT

## 2022-09-26 PROCEDURE — 25000003 PHARM REV CODE 250: Performed by: HOSPITALIST

## 2022-09-26 PROCEDURE — 27201112

## 2022-09-26 PROCEDURE — 99900022

## 2022-09-26 PROCEDURE — 99232 PR SUBSEQUENT HOSPITAL CARE,LEVL II: ICD-10-PCS | Mod: ,,, | Performed by: HOSPITALIST

## 2022-09-26 PROCEDURE — 99900026 HC AIRWAY MAINTENANCE (STAT)

## 2022-09-26 PROCEDURE — 99900031 HC PATIENT EDUCATION (STAT)

## 2022-09-26 PROCEDURE — 25000003 PHARM REV CODE 250: Performed by: STUDENT IN AN ORGANIZED HEALTH CARE EDUCATION/TRAINING PROGRAM

## 2022-09-26 PROCEDURE — 27000221 HC OXYGEN, UP TO 24 HOURS

## 2022-09-26 PROCEDURE — 85025 COMPLETE CBC W/AUTO DIFF WBC: CPT | Performed by: HOSPITALIST

## 2022-09-26 PROCEDURE — 99900035 HC TECH TIME PER 15 MIN (STAT)

## 2022-09-26 PROCEDURE — 63600175 PHARM REV CODE 636 W HCPCS: Performed by: HOSPITALIST

## 2022-09-26 PROCEDURE — 97165 OT EVAL LOW COMPLEX 30 MIN: CPT

## 2022-09-26 PROCEDURE — 94761 N-INVAS EAR/PLS OXIMETRY MLT: CPT

## 2022-09-26 PROCEDURE — 36415 COLL VENOUS BLD VENIPUNCTURE: CPT | Performed by: HOSPITALIST

## 2022-09-26 PROCEDURE — 20600001 HC STEP DOWN PRIVATE ROOM

## 2022-09-26 PROCEDURE — C9113 INJ PANTOPRAZOLE SODIUM, VIA: HCPCS | Performed by: HOSPITALIST

## 2022-09-26 PROCEDURE — 97535 SELF CARE MNGMENT TRAINING: CPT

## 2022-09-26 PROCEDURE — 99232 SBSQ HOSP IP/OBS MODERATE 35: CPT | Mod: ,,, | Performed by: HOSPITALIST

## 2022-09-26 RX ORDER — POLYETHYLENE GLYCOL 3350, SODIUM SULFATE ANHYDROUS, SODIUM BICARBONATE, SODIUM CHLORIDE, POTASSIUM CHLORIDE 236; 22.74; 6.74; 5.86; 2.97 G/4L; G/4L; G/4L; G/4L; G/4L
4000 POWDER, FOR SOLUTION ORAL ONCE
Status: COMPLETED | OUTPATIENT
Start: 2022-09-26 | End: 2022-09-26

## 2022-09-26 RX ADMIN — PANTOPRAZOLE SODIUM 40 MG: 40 INJECTION, POWDER, FOR SOLUTION INTRAVENOUS at 11:09

## 2022-09-26 RX ADMIN — PANTOPRAZOLE SODIUM 40 MG: 40 INJECTION, POWDER, FOR SOLUTION INTRAVENOUS at 09:09

## 2022-09-26 RX ADMIN — PREDNISONE 20 MG: 5 SOLUTION ORAL at 09:09

## 2022-09-26 RX ADMIN — GLYCOPYRROLATE 2 MG: 1 LIQUID ORAL at 04:09

## 2022-09-26 RX ADMIN — TOBRAMYCIN 300 MG: 300 SOLUTION RESPIRATORY (INHALATION) at 09:09

## 2022-09-26 RX ADMIN — ATORVASTATIN CALCIUM 20 MG: 20 TABLET, FILM COATED ORAL at 09:09

## 2022-09-26 RX ADMIN — POLYETHYLENE GLYCOL 3350, SODIUM SULFATE ANHYDROUS, SODIUM BICARBONATE, SODIUM CHLORIDE, POTASSIUM CHLORIDE 4000 ML: 236; 22.74; 6.74; 5.86; 2.97 POWDER, FOR SOLUTION ORAL at 05:09

## 2022-09-26 RX ADMIN — LEVOFLOXACIN 750 MG: 750 TABLET, FILM COATED ORAL at 09:09

## 2022-09-26 NOTE — ANESTHESIA PREPROCEDURE EVALUATION
[facility]  Anesthesia Pre-Operative Evaluation         Patient Name: Fareed Richard Jr.  YOB: 1950  MRN: [id number]    SUBJECTIVE:     Pre-operative evaluation for Procedure(s) (LRB):  EGD (ESOPHAGOGASTRODUODENOSCOPY) (N/A)  COLONOSCOPY (N/A)     09/26/2022    Fareed Richard Jr. is a 72 y.o. male with a significant medical history of SCC of tounge sp resection, COPD, CAD, HTN, anemia and hypothyroidism who presents for the above procedure. He initially presented to ED with worsening respiratory distress for a few weeks. To note, he went to [facility] ER on 9/14 for hemoptysis and bloody trach secretions.  He was transfused 2 units PRBCs.     Recent Labs   Lab 09/26/22  0345   WBC 7.35   RBC 3.39*   HGB 9.0*   HCT 29.2*      MCV 86   MCH 26.5*   MCHC 30.8*         LDA:        Peripheral IV - Single Lumen 09/23/22 2036 20 G Distal;Posterior;Right Forearm (Active)   Site Assessment Clean;Dry;Intact 09/26/22 0800   Extremity Assessment Distal to IV No abnormal discoloration;No redness;No swelling;No warmth 09/25/22 2010   Line Status Flushed;Capped 09/26/22 0800   Dressing Status Clean;Dry;Intact 09/26/22 0800   Dressing Intervention Integrity maintained 09/26/22 0800   Dressing Change Due 09/27/22 09/25/22 0811   Reason Not Rotated Not due 09/25/22 0811   Number of days: 2            Peripheral IV - Single Lumen 09/23/22 1012 18 G Anterior;Left Forearm (Active)   Site Assessment Clean;Dry;Intact 09/26/22 0800   Extremity Assessment Distal to IV No abnormal discoloration 09/25/22 0811   Line Status Flushed;Capped 09/26/22 0800   Dressing Status Clean;Dry;Intact 09/26/22 0800   Dressing Intervention Integrity maintained 09/26/22 0800   Dressing Change Due 09/27/22 09/25/22 0811   Reason Not Rotated Not due 09/25/22 0811   Number of days: 3            Gastrostomy/Enterostomy 01/04/22 Percutaneous endoscopic gastrostomy (PEG) LUQ (Active)   Securement secured to abdomen 09/26/22  0800   Interventions Prior to Feeding patency checked;residual checked 09/26/22 0800   Feeding Type continuous;by pump 09/26/22 0800   Clamp Status/Tolerance unclamped 09/26/22 0800   Feeding Action feeding continued 09/26/22 0800   Dressing dry and intact 09/26/22 0800   Insertion Site no redness;no warmth;no drainage;no tenderness;no swelling 09/26/22 0800   Flush/Irrigation flushed w/;water 09/25/22 2010   Current Rate (mL/hr) 30 mL/hr 09/26/22 0800   Goal Rate (mL/hr) 30 mL/hr 09/26/22 0800   Water Bolus (mL) 150 mL 09/25/22 2010   Formula Name isosource 09/25/22 2010   Tube Feeding Intake (mL) 90 09/25/22 2010   Residual Amount (ml) 0 ml 09/25/22 2010   Number of days: 265       Prev airway:     Intubated:  Postinduction    Mask Ventilation:  Easy with oral airway    Attempts:  1    Attempted By:  Resident anesthesiologist    Method of Intubation:  Video laryngoscopy    Blade:  Ames 3    Laryngeal View Grade: Grade I - full view of cords      Difficult Airway Encountered?: No      Complications:  None    Airway Device:  Oral endotracheal tube    Airway Device Size:  7.5      Patient Active Problem List   Diagnosis    Peripheral vascular disease    Carotid stenosis    Squamous cell cancer of tongue    Coronary artery disease involving native coronary artery of native heart without angina pectoris    Primary hypertension    Other hyperlipidemia    Impaired mobility and ADLs    Tracheobronchitis    Toxic effect of tobacco cigarette, intentional self-harm    COPD exacerbation    Acute blood loss anemia    Secondary malignant neoplasm of cervical lymph node    Protein-calorie malnutrition    Chronic respiratory failure with hypoxia, on home O2 therapy    Acute on chronic respiratory failure    Dysphagia    Aspiration pneumonia    Goals of care, counseling/discussion    Pulmonary infiltrate    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     Bloody sputum    Tracheostomy  present    PEG (percutaneous endoscopic gastrostomy) status    Elevated brain natriuretic peptide (BNP) level    Acute tracheitis    Gastrointestinal hemorrhage with melena    Pneumonia       Review of patient's allergies indicates:  No Known Allergies    Current Inpatient Medications:   atorvastatin  20 mg Per G Tube Daily    levoFLOXacin  750 mg Per G Tube Daily    pantoprazole  40 mg Intravenous BID    prednisone  20 mg Oral Daily    tobramycin (PF)  300 mg Nebulization BID       No current facility-administered medications on file prior to encounter.     Current Outpatient Medications on File Prior to Encounter   Medication Sig Dispense Refill    aspirin 81 MG Chew 1 tablet (81 mg total) by Per G Tube route once daily.  0    atorvastatin (LIPITOR) 20 MG tablet 1 tablet (20 mg total) by Per G Tube route once daily. 90 tablet 3    bisacodyL (DULCOLAX) 10 mg Supp Place 1 suppository (10 mg total) rectally daily as needed.  0    clopidogreL (PLAVIX) 75 mg tablet 1 tablet (75 mg total) by Per G Tube route once daily. 30 tablet 11    glycopyrrolate (CUVPOSA) 1 mg/5 mL (0.2 mg/mL) Soln Take 5 mLs (1 mg total) by mouth 3 (three) times daily as needed (TO REDUCE SECRETIONS). 473 mL 1    guaiFENesin 200 mg/5 mL Liqd Take 400 mg by mouth every 4 (four) hours as needed (for secretions). 473 mL 3    hydrOXYzine HCL (ATARAX) 25 MG tablet SMARTSI.5 Tablet(s) Gastro Tube Every 8 Hours PRN      melatonin (MELATIN) 3 mg tablet 2 tablets (6 mg total) by Per G Tube route nightly.  0    metoprolol succinate (TOPROL-XL) 200 MG 24 hr tablet Take 200 mg by mouth 2 (two) times daily.      ondansetron (ZOFRAN-ODT) 8 MG TbDL Take 1 tablet (8 mg total) by mouth every 8 (eight) hours as needed (nausea). (Patient not taking: Reported on 2022) 30 tablet 1    oxyCODONE (ROXICODONE) 5 mg/5 mL Soln 5 mLs (5 mg total) by Per G Tube route every 4 (four) hours as needed (Pain). 150 mL 0    polyethylene glycol (GLYCOLAX)  17 gram PwPk 17 g by Per G Tube route 2 (two) times daily.  0    prednisone 5 mg/5 mL Soln Take 20 mLs (20 mg total) by mouth once daily for 7 days, THEN 10 mLs (10 mg total) once daily for 7 days. 1120 mL 0    sodium chloride (OCEAN) 0.65 % nasal spray 1 spray by Nasal route as needed for Congestion.  12    WIXELA INHUB 250-50 mcg/dose diskus inhaler SMARTSI Puff(s) By Mouth Every 12 Hours      [DISCONTINUED] losartan (COZAAR) 50 MG tablet 1 tablet (50 mg total) by Per G Tube route once daily. 90 tablet 3    [DISCONTINUED] metoprolol tartrate (LOPRESSOR) 100 MG tablet 1 tablet (100 mg total) by Per G Tube route 2 (two) times daily. 60 tablet 11       Past Surgical History:   Procedure Laterality Date    ABDOMINAL SURGERY      stents placed in liver and large intestines, per patient    CAROTID STENT      CORONARY STENT PLACEMENT  2000    DISSECTION OF NECK Bilateral 2022    Procedure: DISSECTION, NECK;  Surgeon: Naeem Smalls MD;  Location: [address];  Service: ENT;  Laterality: Bilateral;    ESOPHAGOGASTRODUODENOSCOPY W/ PEG N/A 2022    Procedure: EGD, WITH PEG TUBE INSERTION;  Surgeon: Anthony Calabrese MD;  Location: [address];  Service: General;  Laterality: N/A;    EYE SURGERY      Cataract bilateral    femoral stents      bilateral    FLAP PROCEDURE N/A 1/3/2022    Procedure: CREATION, FREE FLAP;  Surgeon: Naeem Smalls MD;  Location: [address];  Service: ENT;  Laterality: N/A;    FLAP PROCEDURE Right 2022    Procedure: CREATION, FREE FLAP;  Surgeon: Stacey Conde MD;  Location: [address];  Service: ENT;  Laterality: Right;  Ischemic start 1203  Ischemic stop 1353    GLOSSECTOMY N/A 2022    Procedure: GLOSSECTOMY;  Surgeon: Naeem Smalls MD;  Location: [address];  Service: ENT;  Laterality: N/A;    RADICAL NECK DISSECTION Left 1/3/2022    Procedure: DISSECTION, NECK, RADICAL;  Surgeon: Naeem Smalls MD;  Location:  [facility] OR 2ND FLR;  Service: ENT;  Laterality: Left;    SKIN CANCER EXCISION      TRACHEOTOMY N/A 2022    Procedure: TRACHEOTOMY;  Surgeon: Naeem Smalls MD;  Location: [facility] OR 2ND FLR;  Service: ENT;  Laterality: N/A;       Social History     Socioeconomic History    Marital status:    Tobacco Use    Smoking status: Former     Packs/day: 2.00     Years: 40.00     Pack years: 80.00     Types: Cigarettes     Start date: 1963     Quit date: 2018     Years since quittin.4    Smokeless tobacco: Never    Tobacco comments:     3/3 ppd x 40 yrs. Currently 3-4 cigarettes daily .He is trying  to quit. Is using a Vapor cigarettes  2-3 x's a day.   Substance and Sexual Activity    Alcohol use: Not Currently     Alcohol/week: 3.0 standard drinks     Types: 3 Cans of beer per week     Comment: beer daily 3-4    Drug use: No    Sexual activity: Not Currently       OBJECTIVE:     Vital Signs Range:  Vitals - 1 value per visit 2022   SYSTOLIC - - 96   DIASTOLIC - - 62   Pulse 97 - 113   Temp - - 97.8   Resp 17 - 19   SPO2 100 92 87   Weight (lb) - - -   Weight (kg) - - -   Height - - -   BMI (Calculated) - - -   VISIT REPORT - - -   Pain Score  - - -   Some recent data might be hidden         CBC:   Lab Results   Component Value Date    WBC 7.35 2022    HGB 9.0 (L) 2022    HCT 29.2 (L) 2022    MCV 86 2022     2022         CMP:   Sodium   Date Value Ref Range Status   2022 135 (L) 136 - 145 mmol/L Final     Potassium   Date Value Ref Range Status   2022 4.7 3.5 - 5.1 mmol/L Final     Chloride   Date Value Ref Range Status   2022 97 95 - 110 mmol/L Final     CO2   Date Value Ref Range Status   2022 34 (H) 23 - 29 mmol/L Final     Glucose   Date Value Ref Range Status   2022 233 (H) 70 - 110 mg/dL Final     BUN   Date Value Ref Range Status   2022 30 (H) 8 - 23 mg/dL Final     Creatinine   Date Value  Ref Range Status   09/23/2022 0.7 0.5 - 1.4 mg/dL Final     Calcium   Date Value Ref Range Status   09/23/2022 9.3 8.7 - 10.5 mg/dL Final     Total Protein   Date Value Ref Range Status   09/23/2022 6.3 6.0 - 8.4 g/dL Final     Albumin   Date Value Ref Range Status   09/23/2022 2.5 (L) 3.5 - 5.2 g/dL Final     Total Bilirubin   Date Value Ref Range Status   09/23/2022 0.1 0.1 - 1.0 mg/dL Final     Comment:     For infants and newborns, interpretation of results should be based  on gestational age, weight and in agreement with clinical  observations.    Premature Infant recommended reference ranges:  Up to 24 hours.............<8.0 mg/dL  Up to 48 hours............<12.0 mg/dL  3-5 days..................<15.0 mg/dL  6-29 days.................<15.0 mg/dL       Alkaline Phosphatase   Date Value Ref Range Status   09/23/2022 64 55 - 135 U/L Final     AST   Date Value Ref Range Status   09/23/2022 13 10 - 40 U/L Final     ALT   Date Value Ref Range Status   09/23/2022 13 10 - 44 U/L Final     Anion Gap   Date Value Ref Range Status   09/23/2022 4 (L) 8 - 16 mmol/L Final     eGFR if    Date Value Ref Range Status   05/17/2022 >60.0 >60 mL/min/1.73 m^2 Final     eGFR if non    Date Value Ref Range Status   05/17/2022 >60.0 >60 mL/min/1.73 m^2 Final     Comment:     Calculation used to obtain the estimated glomerular filtration  rate (eGFR) is the CKD-EPI equation.          INR:  Lab Results   Component Value Date    INR 1.0 09/14/2022    INR 0.9 01/12/2022    INR 0.9 01/04/2022       EKG:   Results for orders placed or performed during the hospital encounter of 09/14/22   EKG 12-lead    Collection Time: 09/14/22  5:17 AM    Narrative    Test Reason : R07.9,    Vent. Rate : 072 BPM     Atrial Rate : 072 BPM     P-R Int : 170 ms          QRS Dur : 092 ms      QT Int : 386 ms       P-R-T Axes : 055 -40 058 degrees     QTc Int : 422 ms    Normal sinus rhythm  Left axis deviation  Incomplete right  bundle branch block  Possible Anterior infarct ,age undetermined  Abnormal ECG  When compared with ECG of 26-AUG-2022 13:49,  No significant change was found  Confirmed by Tacos Benitez MD ([id number]) on 9/14/2022 6:00:07 PM    Referred By: LEIDA   SELF           Confirmed By:Tacos Benitez MD        2D ECHO:   Results for orders placed during the hospital encounter of 09/23/22    Echo    Interpretation Summary  · The left ventricle is normal in size with concentric remodeling and normal systolic function. The estimated ejection fraction is 55%.  · Normal right ventricular size with normal right ventricular systolic function.  · Grade I left ventricular diastolic dysfunction.  · Mild left atrial enlargement.  · The aortic root is mildly dilated.  · The estimated PA systolic pressure is 34 mmHg.  · Normal central venous pressure (3 mmHg).         ASSESSMENT/PLAN:         Pre-op Assessment    I have reviewed the Patient Summary Reports.     I have reviewed the Nursing Notes. I have reviewed the NPO Status.   I have reviewed the Medications.     Review of Systems  Anesthesia Hx:  History of prior surgery of interest to airway management or planning: Previous anesthesia: General BILATERAL FEMORAL STENTS, LEFT ARE PSUEDOANEURYSM RUPTURE REPAIR ~2017 with general anesthesia.  Denies Family Hx of Anesthesia complications.   Denies Personal Hx of Anesthesia complications.   Social:  Former Smoker, Alcohol Use 2-3 BEERS/DAY   Hematology/Oncology:  Hematology Normal      Current/Recent Cancer. --  Cancer in past history:  surgery  Oncology Comments: S/P SURGICAL REMOVAL OF CHEEK TUMOR WITH FLAP FROM NECK. S/P SURGICAL REMOVAL OF RIGHT FOREARM SKIN CANCER WITH SKIN GRAFT FROM LEFT THIGH. S/P LIP TUMOR REMOVAL.     EENT/Dental:   MASS OF TONGUE. EDENTULOUS.   Cardiovascular:   Hypertension CAD    Denies Angina. hyperlipidemia S/P SURGICAL REPAIR OF PSEUDOANEURYSM OF LEFT ARM 2017. Functional Capacity 3 METS  Coronary Artery  Disease: S/P Percutaneous Coronary Intervention (PCI) coronary stent, unknown type, currently on aspirin, currently on clopidogrel (Plavix).  Peripheral Arterial Disease MESENTERIC ARTERY STENOSIS. S/P BILATERAL FEMORAL STENTS.  Carotoid Artery Disease (S/P CAROTID STENT), s/p percutaneous intervention    Pulmonary:   COPD Denies Shortness of breath.  Denies Recent URI.    Renal/:  Renal/ Normal     Hepatic/GI:  Hepatic/GI Normal    Musculoskeletal:  Musculoskeletal Normal    Neurological:  Neurology Normal    Endocrine:  Endocrine Normal    Psych:  Psychiatric Normal           Physical Exam  General: Cooperative, Alert and Oriented    Airway:  Mallampati: III   Mouth Opening: Normal  TM Distance: Normal  Tongue: Normal  Neck ROM: Normal ROM  Pre-Existing Airway: Tracheostomy tube    Dental:  Edentulous        Anesthesia Plan  Type of Anesthesia, risks & benefits discussed:    Anesthesia Type: Gen ETT, Gen Natural Airway  Intra-op Monitoring Plan: Standard ASA Monitors  Post Op Pain Control Plan: multimodal analgesia and IV/PO Opioids PRN  Induction:  IV  Airway Plan: Via Tracheostomy, Post-Induction  Informed Consent: Informed consent signed with the Patient and all parties understand the risks and agree with anesthesia plan.  All questions answered.   ASA Score: 4  Day of Surgery Review of History & Physical: H&P Update referred to the surgeon/provider.  Anesthesia Plan Notes: NPO confirmed.   Patient extremely high-risk based upon pre-existing pneumonia with increased oxygen requirements. Breathing remains mildly labored and O2 increased to 8L/min.  Discussed case and plan in detail with Dr. Peterson. Patient continues to have slow bleed and did prep for procedure so some urgency. Understands context of poor oxygenation and current pneumonia.   Discussed case and plan in detail with ENT resident. Plan to maintain natural breathing but exchange uncuffed trach for ETT if needed for PPV.   Discussed all of this in  detail with patient. He understands and wishes to proceed.      Ready For Surgery From Anesthesia Perspective.     .

## 2022-09-26 NOTE — SUBJECTIVE & OBJECTIVE
Interval History: No acute events overnight.  Resp cx with Pseudomonas.  Feels Tobra nebs help breathing.  No bleeding noted.  Hgb stable.  Plan for EGD tomorrow.    Review of Systems   Constitutional:  Negative for chills, fatigue and fever.   Respiratory:  Negative for cough and shortness of breath.    Cardiovascular:  Negative for chest pain, palpitations and leg swelling.   Gastrointestinal:  Negative for abdominal pain, diarrhea, nausea and vomiting.   Genitourinary:  Negative for dysuria and urgency.   Neurological:  Negative for dizziness and headaches.   All other systems reviewed and are negative.  Objective:     Vital Signs (Most Recent):  Temp: 97.6 °F (36.4 °C) (09/26/22 0700)  Pulse: 101 (09/26/22 0925)  Resp: 18 (09/26/22 0925)  BP: 96/63 (09/26/22 0700)  SpO2: (P) 95 % (09/26/22 0925)   Vital Signs (24h Range):  Temp:  [97.1 °F (36.2 °C)-98.1 °F (36.7 °C)] 97.6 °F (36.4 °C)  Pulse:  [] 101  Resp:  [17-20] 18  SpO2:  [91 %-100 %] (P) 95 %  BP: ()/(62-75) 96/63     Weight: 59.9 kg (132 lb)  Body mass index is 23.38 kg/m².    Intake/Output Summary (Last 24 hours) at 9/26/2022 1044  Last data filed at 9/26/2022 0600  Gross per 24 hour   Intake 240 ml   Output 850 ml   Net -610 ml        Physical Exam  Constitutional:       Appearance: Normal appearance. He is well-developed.   HENT:      Head: Normocephalic and atraumatic.   Neck:      Comments: Trach  Cardiovascular:      Rate and Rhythm: Normal rate and regular rhythm.      Heart sounds: No murmur heard.  Pulmonary:      Effort: Pulmonary effort is normal. No respiratory distress.      Breath sounds: Normal breath sounds. No wheezing or rales.   Abdominal:      General: There is no distension.      Palpations: Abdomen is soft.      Tenderness: There is no abdominal tenderness.      Comments: Peg   Musculoskeletal:         General: No deformity.   Skin:     General: Skin is warm.      Coloration: Skin is pale.   Neurological:      General: No  focal deficit present.      Mental Status: He is alert and oriented to person, place, and time. Mental status is at baseline.       Significant Labs: All pertinent labs within the past 24 hours have been reviewed.  CBC:   Recent Labs   Lab 09/25/22  0254 09/26/22  0345   WBC 7.16 7.35   HGB 8.1* 9.0*   HCT 27.1* 29.2*    343       Respiratory Culture:   No results for input(s): GSRESP, RESPIRATORYC in the last 48 hours.      Significant Imaging: I have reviewed all pertinent imaging results/findings within the past 24 hours.

## 2022-09-26 NOTE — PT/OT/SLP EVAL
Occupational Therapy   Evaluation    Name: Fareed Richard Jr.  MRN: [id number]  Admitting Diagnosis:  Acute on chronic respiratory failure  Recent Surgery: Procedure(s) (LRB):  EGD (ESOPHAGOGASTRODUODENOSCOPY) (N/A)  COLONOSCOPY (N/A) * Surgery Date in Future *    Recommendations:     Discharge Recommendations: home  Discharge Equipment Recommendations:  shower chair, raised toilet  Barriers to discharge:       Assessment:     Fareed Richard Jr. is a 72 y.o. male with a medical diagnosis of Acute on chronic respiratory failure.  Today pt. HR elevated to 132 bmp when getting up from bed. Pt.'s Vitals was monitored and HR decrease to 122 bmp when placed back into bed. Performance deficits affecting function: weakness, impaired cardiopulmonary response to activity, impaired balance, impaired functional mobility, impaired endurance.      Rehab Prognosis: Fair; patient would benefit from acute skilled OT services to address these deficits and reach maximum level of function.       Plan:     Patient to be seen 3 x/week to address the above listed problems via self-care/home management, therapeutic activities, therapeutic exercises, community/work re-entry  Plan of Care Expires: 10/26/22  Plan of Care Reviewed with: patient    Subjective     Chief Complaint: Pt. Reports having SOB when the speaking valves was placed  Patient/Family Comments/goals:     Occupational Profile:  Living Environment: Lives in [facility] with 4 [address]EC[address] with his wife. Pt. Reports having a tub shower combo with a shower chair   Previous level of function: I in ADLS  Roles and Routines: Retired and    Equipment Used at Home:  respiratory supplies, oxygen   Pt. Reports havibf a walker and w/c at home but does not use it.   Assistance upon Discharge: Wife Bridgett    Pain/Comfort:  Pain Rating 1: 0/10    Patients cultural, spiritual, Latter-day conflicts given the current situation: no    Objective:     Communicated with: RN prior to session.  Patient found HOB  elevated with pulse ox (continuous), Tracheostomy, blood pressure cuff, telemetry, peripheral IV, PEG Tube upon OT entry to room.    General Precautions: Standard, fall   Orthopedic Precautions:    Braces:    Respiratory Status:  Trach collar , 5 L of Oxygen    Occupational Performance:    Bed Mobility:    Patient completed Rolling/Turning to Left with  supervision  Patient completed Supine to Sit with supervision  Patient completed Sit to Supine with supervision    Functional Mobility/Transfers:  Patient completed Sit <> Stand Transfer with stand by assistance  with  no assistive device   Functional Mobility: Pt. Took ~4 step to HOB, initially appear unsteady however recovered instantly with CGA    Activities of Daily Living:  Lower Body Dressing: independence Donned and Doffed socks at bed level in long sitting   Grooming: Supervision at EOB  to wash face and comb hair     Cognitive/Visual Perceptual:  Cognitive/Psychosocial Skills:     -       Oriented to: Person, Place, Time, and Situation   -       Follows Commands/attention:Follows multistep  commands  -       Safety awareness/insight to disability: impaired     Physical Exam:  Balance   - Static sitting: SBA  - Dynamic sitting : SBA  -Static standing : SBA  - Dynamic standing: CGA    Postural examination/scapula alignment:    -       Rounded shoulders  Dominant hand:    -       Left  Upper Extremity Range of Motion:     -       Right Upper Extremity: WNL  -       Left Upper Extremity: WNL  Upper Extremity Strength:    -       Right Upper Extremity: WNL  -       Left Upper Extremity: WNL   Strength:    -       Right Upper Extremity: WNL  -       Left Upper Extremity: WNL    AMPAC 6 Click ADL:  AMPAC Total Score: 20    Treatment & Education:  Pt. Educated on the benefits of energy conversation techniques, when OOB activities and ADLs.  Pt. Instructed and educated on pursed lip breathing techniques    POC reviewed with pt.       Patient left HOB elevated  with all lines intact and call button in reach    GOALS:   Multidisciplinary Problems       Occupational Therapy Goals          Problem: Occupational Therapy    Goal Priority Disciplines Outcome Interventions   Occupational Therapy Goal     OT, PT/OT Ongoing, Progressing    Description: Goals to be met by: 10/3/22     Patient will increase functional independence with ADLs by performing:    Grooming while standing at sink with Contact Guard Assistance.  Toileting from bedside commode with Contact Guard Assistance for hygiene and clothing management.   Toilet transfer to bedside commode with Contact Guard Assistance.  Pt. Will implement energy conservation techniques with bed mobilitywith at least 2 verbal cue to improve quality of life.  Pt. Will verbalize an understanding of energy conservation technique handout using teach back method                        History:     Past Medical History:   Diagnosis Date    Cancer     skin to Rt forearm and face    COPD (chronic obstructive pulmonary disease)     Hyperlipidemia     Hypertension     Pseudoaneurysm          Past Surgical History:   Procedure Laterality Date    ABDOMINAL SURGERY      stents placed in liver and large intestines, per patient    CAROTID STENT      CORONARY STENT PLACEMENT  01/2000    DISSECTION OF NECK Bilateral 1/4/2022    Procedure: DISSECTION, NECK;  Surgeon: Naeem Smalls MD;  Location: [address];  Service: ENT;  Laterality: Bilateral;    ESOPHAGOGASTRODUODENOSCOPY W/ PEG N/A 1/4/2022    Procedure: EGD, WITH PEG TUBE INSERTION;  Surgeon: Anthony Calabrese MD;  Location: [address];  Service: General;  Laterality: N/A;    EYE SURGERY      Cataract bilateral    femoral stents      bilateral    FLAP PROCEDURE N/A 1/3/2022    Procedure: CREATION, FREE FLAP;  Surgeon: Naeem Smalls MD;  Location: [address];  Service: ENT;  Laterality: N/A;    FLAP PROCEDURE Right 1/4/2022    Procedure: CREATION, FREE FLAP;  Surgeon:  Stacey Conde MD;  [facility];  Service: ENT;  Laterality: Right;  Ischemic start 1203  Ischemic stop 1353    GLOSSECTOMY N/A 1/4/2022    Procedure: GLOSSECTOMY;  Surgeon: Naeem Smalls MD;  [facility];  Service: ENT;  Laterality: N/A;    RADICAL NECK DISSECTION Left 1/3/2022    Procedure: DISSECTION, NECK, RADICAL;  Surgeon: Naeem Smalls MD;  [facility];  Service: ENT;  Laterality: Left;    SKIN CANCER EXCISION      TRACHEOTOMY N/A 1/4/2022    Procedure: TRACHEOTOMY;  Surgeon: Naeem Smalls MD;  Location: [facility] OR [address] [place] [address];  Service: ENT;  Laterality: N/A;       Time Tracking:     OT Date of Treatment: 09/26/22  OT Start Time: 1049  OT Stop Time: 1112  OT Total Time (min): 23 min    Billable Minutes:Evaluation 10  Self Care/Home Management 13    9/26/2022

## 2022-09-26 NOTE — PLAN OF CARE
[facility] - Telemetry Stepdown  Initial Discharge Assessment       Primary Care Provider: Kodi Tubbs MD    Admission Diagnosis: CHF (congestive heart failure) [I50.9]  Pneumonia of both lungs due to infectious organism, unspecified part of lung [J18.9]    Admission Date: 9/23/2022  Expected Discharge Date: 9/28/2022    Discharge Barriers Identified: None    Payor: [employer] / Plan: [email or web address] CHOICES 65 / Product Type: Medicare Advantage /     Extended Emergency Contact Information  Primary Emergency Contact: Bridgett Richard  Address: [address] [place] [address] [facility]  Home Phone: [phone]  Work Phone: [phone]  Mobile Phone: [phone]  Relation: Spouse    Discharge Plan A: Home with family  Discharge Plan B: Home Health      [id number] [place] - [address]  [address]  [place] [address]  Phone: [phone] Fax: [phone]    [id number] [place] - [address]  [address]  [place] [address]  Phone: [phone] Fax: [phone]      Initial Assessment (most recent)       Adult Discharge Assessment - 09/26/22 1345          Discharge Assessment    Assessment Type Discharge Planning Assessment     Confirmed/corrected address, phone number and insurance Yes     Confirmed Demographics Correct on Facesheet     Source of Information patient     Communicated NISA with patient/caregiver Yes     Reason For Admission PNA     Lives With spouse     Do you expect to return to your current living situation? Yes     Do you have help at home or someone to help you manage your care at home? Yes     Who are your caregiver(s) and their phone number(s)? Bridgett Richard (spouse) [phone]     Prior to hospitilization cognitive status: Alert/Oriented     Current cognitive status: Alert/Oriented     Walking or Climbing Stairs Difficulty none     Dressing/Bathing Difficulty none     Equipment Currently Used at  Home respiratory supplies;oxygen     Readmission within 30 days? No     Patient currently being followed by outpatient case management? No     Do you currently have service(s) that help you manage your care at home? No     Do you take prescription medications? Yes     Do you have prescription coverage? Yes     Do you have any problems affording any of your prescribed medications? No     Is the patient taking medications as prescribed? yes     How do you get to doctors appointments? family or friend will provide     Are you on dialysis? No     Do you take coumadin? No     Discharge Plan A Home with family     Discharge Plan B Home Health     DME Needed Upon Discharge  other (see comments)   TBD    Discharge Plan discussed with: Patient     Discharge Barriers Identified None                   Lashell Manning RN  Ext [phone]

## 2022-09-26 NOTE — CONSULTS
[facility] - Telemetry Stepdown  Adult Nutrition  Consult Note    SUMMARY     Recommendations  1) Increase goal rate of tube feed to 55 mL/hr with  mL q 4 hr to better meet pt's needs.   - Will provide 1980 kcal, 90 g protein, 1008 mL formula free water + 990 mL FWF.   - Meets 100% kcal/protein/fluid needs   - Hold for residuals > 500 cc.     2) If Bolus indicated, bolus 1 can 6 times daily to provide 107% kcal, 100% protein needs.  -  FWF 70 mL before and after each bolus.    3) Suggest drawing Hgb A1c for elevated BG    4) RD to complete NFPE at follow up    Goals: Pt to tolerate tube feed at goal by RD follow up  Nutrition Goal Status: new  Communication of RD Recs: other (comment) (POC, sticky note)    Assessment and Plan      Nutrition Problem  Swallowing difficulty    Related to (etiology):   Squamous cell ca of tongue    Signs and Symptoms (as evidenced by):   Pt NPO with PEG, s/p chemo/radiation, flap, trach    Interventions(treatment strategy):  Collaboration with other providers  Enteral Nutrition    Nutrition Diagnosis Status:   New    Malnutrition Assessment   LCAY NFPE due to remote assessment  Hx severe malnutrition documented 5/11/22 - wt has been stable since then    Reason for Assessment  Reason For Assessment: consult (Tube feed)  Diagnosis: cancer diagnosis/related complications, other (see comments) (acute on chronic respiratory failure)  Relevant Medical History: tobacco abuse, complicated by 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 Plan for EGD 9/27, TF running until this evening. Pt NPO, bx of squamous cell ca of tongue s/p chemo and radiation, lfap, trach and PEG 1/2022. Pt continues NPO and on home tube feeds. Pt nonverbal per chart, not appropriate for phone conversation. Per chart history/last RD note 5/11/22, rec 6  "cans bolus Isosource 1.5/day. With nutrition dx severe malnutrition 5/11/22 - pt's weight has been stable over the last 4 months. RD to follow.  Nutrition Discharge Planning: Discharge on Isosource 1.5 - bolus 6 cans/day as medically able    Nutrition Risk Screen  Nutrition Risk Screen: tube feeding or parenteral nutrition    Nutrition/Diet History  Spiritual, Cultural Beliefs, Sabianist Practices, Values that Affect Care: no  Food Allergies: NKFA  Factors Affecting Nutritional Intake: NPO, chewing difficulties/inability to chew food, difficulty/impaired swallowing  Nutrition Support Formula Prior to Admit: Isosource 1.5 (6 cans/day per last RD note 5/11/22)    Anthropometrics  Temp: 97.6 °F (36.4 °C)  Height: 5' 3" (160 cm)  Height (inches): 63 in  Weight Method: Bed Scale  Weight: 59.9 kg (132 lb)  Weight (lb): 132 lb  Ideal Body Weight (IBW), Male: 124 lb  % Ideal Body Weight, Male (lb): 106.45 %  BMI (Calculated): 23.4       Lab/Procedures/Meds  Pertinent Labs Reviewed: reviewed  Pertinent Labs Comments: Na 135, BUN 30, Glu 233, Alb 2.5, CRP 33.1  Pertinent Medications Reviewed: reviewed  Pertinent Medications Comments: atorvastatin, prednisone, pantoprazole    Estimated/Assessed Needs  Weight Used For Calorie Calculations: 59.9 kg (132 lb)  Energy Calorie Requirements (kcal): 2516-6619 kcal/day based on 30-35 kcal/kg  Energy Need Method: Kcal/kg  Protein Requirements:  g/day based on 1.5-1.7 g/kg  Weight Used For Protein Calculations: 59.9 kg (132 lb)  Fluid Requirements (mL): 1 mL/kcal or per MD  Estimated Fluid Requirement Method: RDA Method  RDA Method (mL): 1796       Nutrition Prescription Ordered  Current Diet Order: NPO  Current Nutrition Support Formula Ordered: Isosource 1.5  Current Nutrition Support Rate Ordered: 30 (ml)  Current Nutrition Support Frequency Ordered: continuous    Evaluation of Received Nutrient/Fluid Intake    Intake/Output Summary (Last 24 hours) at 9/26/2022 1015  Last data " filed at 9/26/2022 0600  Gross per 24 hour   Intake 240 ml   Output 850 ml   Net -610 ml     Enteral Calories (kcal): 1080  Enteral Protein (gm): 49  Enteral (Free Water) Fluid (mL): 550  % Kcal Needs: 60  % Protein Needs: 54  I/O: +0.165 L since admit  Energy Calories Required: not meeting needs  Protein Required: not meeting needs  Fluid Required: not meeting needs  Comments: LBM 9/22  Tolerance: tolerating  % Intake of Estimated Energy Needs: 50 - 75 %  % Meal Intake: NPO    Nutrition Risk  Level of Risk/Frequency of Follow-up:  (1-2x weekly)     Monitor and Evaluation  Food and Nutrient Intake: energy intake, enteral nutrition intake  Food and Nutrient Adminstration: enteral and parenteral nutrition administration  Knowledge/Beliefs/Attitudes: beliefs and attitudes  Physical Activity and Function: nutrition-related ADLs and IADLs  Anthropometric Measurements: weight change, body mass index, weight  Biochemical Data, Medical Tests and Procedures: electrolyte and renal panel, lipid profile, gastrointestinal profile, glucose/endocrine profile, inflammatory profile  Nutrition-Focused Physical Findings: overall appearance, extremities, muscles and bones, skin     Nutrition Follow-Up  RD Follow-up?: Yes

## 2022-09-26 NOTE — ASSESSMENT & PLAN NOTE
· Recently with hemoptysis and now yellow/green and foul smelling secretions from his trach and rhonchi - recent CT chest 9/14 with pneumonia  · Trach culture with Pseudomonas - Previous resp cx with Pseudomonas, unclear if colonization  · Started on Zosyn 9/23, changed to Levaquin by ID 9/24 because of high AMRITA.  Plan for 7 days (end date 10/1)  · Continue Tobra nebs  · Can consider ENT eval

## 2022-09-26 NOTE — PLAN OF CARE
Recommendations  1) Increase goal rate of tube feed to 55 mL/hr with  mL q 4 hr to better meet pt's needs.   - Will provide 1980 kcal, 90 g protein, 1008 mL formula free water + 990 mL FWF.   - Meets 100% kcal/protein/fluid needs   - Hold for residuals > 500 cc.     2) If Bolus indicated, bolus 1 can 6 times daily to provide 107% kcal, 100% protein needs.  -  FWF 70 mL before and after each bolus.    3) Suggest drawing Hgb A1c for elevated BG    4) RD to complete NFPE at follow up    Goals: Pt to tolerate tube feed at goal by RD follow up  Nutrition Goal Status: new  Communication of RD Recs: other (comment) (POC, sticky note)    Assessment and Plan      Nutrition Problem  Swallowing difficulty    Related to (etiology):   Squamous cell ca of tongue    Signs and Symptoms (as evidenced by):   Pt NPO with PEG, s/p chemo/radiation, flap, trach    Interventions(treatment strategy):  Collaboration with other providers  Enteral Nutrition    Nutrition Diagnosis Status:   New

## 2022-09-26 NOTE — PLAN OF CARE
Problem: Adjustment to Illness (Gastrointestinal Bleeding)  Goal: Optimal Coping with Acute Illness  Outcome: Ongoing, Not Progressing     Problem: Bleeding (Gastrointestinal Bleeding)  Goal: Hemostasis  Outcome: Ongoing, Not Progressing     Problem: Adult Inpatient Plan of Care  Goal: Plan of Care Review  Outcome: Ongoing, Not Progressing  Goal: Patient-Specific Goal (Individualized)  Outcome: Ongoing, Not Progressing  Goal: Absence of Hospital-Acquired Illness or Injury  Outcome: Ongoing, Not Progressing  Goal: Optimal Comfort and Wellbeing  Outcome: Ongoing, Not Progressing  Goal: Readiness for Transition of Care  Outcome: Ongoing, Not Progressing     Problem: Fluid Imbalance (Pneumonia)  Goal: Fluid Balance  Outcome: Ongoing, Not Progressing     Problem: Infection (Pneumonia)  Goal: Resolution of Infection Signs and Symptoms  Outcome: Ongoing, Not Progressing     Problem: Respiratory Compromise (Pneumonia)  Goal: Effective Oxygenation and Ventilation  Outcome: Ongoing, Not Progressing     Problem: Impaired Wound Healing  Goal: Optimal Wound Healing  Outcome: Ongoing, Not Progressing    Pt began taking scheduled Golytely for procedure tomorrow. Pt denied pain during shift. Pt was continent of bladder during shift. No signs of acute distress during shift. Pt cough productive during shift.

## 2022-09-26 NOTE — RESPIRATORY THERAPY
RAPID RESPONSE RESPIRATORY THERAPY PROACTIVE NOTE           Time of visit: 952     Code Status: Full Code   : 1950  Bed: 8096/8096 A:   MRN: [id number]  Time spent at the bedside: < 15 min    SITUATION    Evaluated patient for: LDA Check     BACKGROUND    Patient has a past medical history of Cancer, COPD (chronic obstructive pulmonary disease), Hyperlipidemia, Hypertension, and Pseudoaneurysm.  Clinically Significant Surgical Hx: tracheostomy    24 Hours Vitals Range:  Temp:  [97.1 °F (36.2 °C)-98.1 °F (36.7 °C)]   Pulse:  []   Resp:  [17-20]   BP: ()/(62-75)   SpO2:  [87 %-100 %]     Labs:    Recent Labs     22  1247   *   K 4.7   CL 97   CO2 34*   CREATININE 0.7   *   MG 2.2        No results for input(s): PH, PCO2, PO2, HCO3, POCSATURATED, BE in the last 72 hours.    ASSESSMENT/INTERVENTIONS  Pt in bed with no need for respiratory intervention at this time. Bedside RT, Ro, instructed to place 4.0 Shiley cuffed at bedside in place of the uncuffed for emergency use.      Last VS   Temp: 97.8 °F (36.6 °C) ( 113)  Pulse: 113 ( 113)  Resp: 19 ( 113)  BP: 96/62 ( 113)  SpO2: 87 % (1135)      Extra trachs at bedside: 6.0 Shiley Cuffed / 4.0 Shiley Uncuffed  Level of Consciousness: Level of Consciousness (AVPU): alert  Respiratory Effort: Respiratory Effort: Unlabored Expansion/Accessory Muscle Usage: Expansion/Accessory Muscles/Retractions: no retractions, no use of accessory muscles  All Lung Field Breath Sounds: All Lung Fields Breath Sounds: clear, diminished  DEE Breath Sounds: wheezes, expiratory, clear  LLL Breath Sounds: diminished, clear  RUL Breath Sounds: clear, diminished  RML Breath Sounds: clear, diminished  RLL Breath Sounds: clear, diminished  O2 Device/Concentration: Trach collar @ 5L/28%  Surgical airway: Yes, Type: Shiley Size: 6, uncuffed  Ambu at bedside: Ambu bag with the patient?: Yes, Adult Ambu     Active Orders   Respiratory  Care    Oxygen Continuous     Frequency: Continuous     Number of Occurrences: Until Specified     Order Questions:      Device type: Low flow      Device: Trach Collar      Titrate O2 per Oxygen Titration Protocol: Yes      To maintain SpO2 goal of: >= 90%      Notify MD of: Inability to achieve desired SpO2; Sudden change in patient status and requires 20% increase in FiO2; Patient requires >60% FiO2    Routine tracheostomy care     Frequency: BID     Number of Occurrences: Until Specified       RECOMMENDATIONS    We recommend: RRT Recs: Continue POC per primary team.      FOLLOW-UP    Please call back the Rapid Response RT, Leo Reilly RRT at x [phone] for any questions or concerns.

## 2022-09-26 NOTE — PROGRESS NOTES
[facility] - Telemetry [facility] Medicine  Progress Note    Patient Name: Fareed Richard Jr.  MRN: [id number]  Patient Class: IP- Inpatient   Admission Date: 9/23/2022  Length of Stay: 3 days  Attending Physician: Brenda Saldivar MD  Primary Care Provider: Kodi Tubbs MD        Subjective:     Principal Problem:Acute on chronic respiratory failure        HPI:  Mr. Fareed Richard Jr. is a 72 y.o. 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  History is obtained from wife at bedside.  She reports that at baseline, he is on 5L trach collar.  However, for the last 1-2 weeks he has been having worsening respiratory issues.  He is currently on a prolonged Prednisone taper.  [place] [facility] [place] hemoptysis and bloody trach secretions.  Hemoglobin was 8.4.  At that encounter, he was evaluated by ENT who felt like his airway was patent.  His hemoptysis stopped before leaving the ER.  He was discharged with Augmentin for aspiration pneumonia seen on CT scan.  However, on the day of admission he complained of chest pain, shortness of breath, and worsening fatigue.  Wife also endorsed yellow/green and foul colored trach secretions, but no fever or chills.  She suctioned him, which did not help - so she had to increase his home oxygen from 5L to 10L.  EMS was called and suctioned him, getting his oxygen saturations to 100% on 5L NC.  He denies any dark stools to his knowledge, but is on Aspirin and Plavix.  Chart review showed multiple respiratory cultures with Pseudomonas, unclear if colonization.    Upon arrival to the ER, vitals were temp 98.2F, HR 87, /78 satting 100% on 10L trach collar, 60% FiO2.  Labs showed Hemoglobin 5.5 and .  CXR showed pulmonary vascular and interstitial markings with effusions, no  clear consolidation.  HAYLEE was positive for melena.  He was given 1L NS, Vanc, Zosyn.  2 units PRBCs were ordered and he was given IV PPI.  He was admitted to Hospital Medicine for further management.      Overview/Hospital Course:  Mr. Richard was admitted to Hospital Medicine for management of acute on chronic respiratory failure 2/2 tracheitis/pneumonia, as well as a GIB.  He was started transfused 2 units PRBCs, started on IV PPI and GI was consulted.  He was started on Zosyn and Tobramycin nebs for pneumonia/tracheitis.  Resp cx returned with pan sensitive Pseudomonas.  ID was consulted.  He was changed from Zosyn to Levaquin given the high AMRITA.  GI plans for EGD 9/27, might require ENT clearance for trach.      Interval History: No acute events overnight.  Resp cx with Pseudomonas.  Feels Tobra nebs help breathing.  No bleeding noted.  Hgb stable.  Plan for EGD tomorrow.    Review of Systems   Constitutional:  Negative for chills, fatigue and fever.   Respiratory:  Negative for cough and shortness of breath.    Cardiovascular:  Negative for chest pain, palpitations and leg swelling.   Gastrointestinal:  Negative for abdominal pain, diarrhea, nausea and vomiting.   Genitourinary:  Negative for dysuria and urgency.   Neurological:  Negative for dizziness and headaches.   All other systems reviewed and are negative.  Objective:     Vital Signs (Most Recent):  Temp: 97.6 °F (36.4 °C) (09/26/22 0700)  Pulse: 101 (09/26/22 0925)  Resp: 18 (09/26/22 0925)  BP: 96/63 (09/26/22 0700)  SpO2: (P) 95 % (09/26/22 0925)   Vital Signs (24h Range):  Temp:  [97.1 °F (36.2 °C)-98.1 °F (36.7 °C)] 97.6 °F (36.4 °C)  Pulse:  [] 101  Resp:  [17-20] 18  SpO2:  [91 %-100 %] (P) 95 %  BP: ()/(62-75) 96/63     Weight: 59.9 kg (132 lb)  Body mass index is 23.38 kg/m².    Intake/Output Summary (Last 24 hours) at 9/26/2022 1044  Last data filed at 9/26/2022 0600  Gross per 24 hour   Intake 240 ml   Output 850 ml   Net -610 ml         Physical Exam  Constitutional:       Appearance: Normal appearance. He is well-developed.   HENT:      Head: Normocephalic and atraumatic.   Neck:      Comments: Trach  Cardiovascular:      Rate and Rhythm: Normal rate and regular rhythm.      Heart sounds: No murmur heard.  Pulmonary:      Effort: Pulmonary effort is normal. No respiratory distress.      Breath sounds: Normal breath sounds. No wheezing or rales.   Abdominal:      General: There is no distension.      Palpations: Abdomen is soft.      Tenderness: There is no abdominal tenderness.      Comments: Peg   Musculoskeletal:         General: No deformity.   Skin:     General: Skin is warm.      Coloration: Skin is pale.   Neurological:      General: No focal deficit present.      Mental Status: He is alert and oriented to person, place, and time. Mental status is at baseline.       Significant Labs: All pertinent labs within the past 24 hours have been reviewed.  CBC:   Recent Labs   Lab 09/25/22  0254 09/26/22  0345   WBC 7.16 7.35   HGB 8.1* 9.0*   HCT 27.1* 29.2*    343       Respiratory Culture:   No results for input(s): GSRESP, RESPIRATORYC in the last 48 hours.      Significant Imaging: I have reviewed all pertinent imaging results/findings within the past 24 hours.      Assessment/Plan:      * Acute on chronic respiratory failure  · On 5L trach collar at home, but requiring 10L trach collar on admit, down to home 5L  · Likely 2/2 tracheitis and pneumonia  · Continue Prednisone taper from outpatient - 20mg daily x 7 days, then 10mg x  7 days  · Management as below    Pneumonia  · As above      Acute tracheitis  · Recently with hemoptysis and now yellow/green and foul smelling secretions from his trach and rhonchi - recent CT chest 9/14 with pneumonia  · Trach culture with Pseudomonas - Previous resp cx with Pseudomonas, unclear if colonization  · Started on Zosyn 9/23, changed to Levaquin by ID 9/24 because of high AMRITA.  Plan for 7 days  (end date 10/1)  · Continue Tobra nebs  · Can consider ENT eval      Gastrointestinal hemorrhage with melena  · GIB Pathway initiated  · Hgb 5.5 on admit, baseline around 8 as of 2 weeks ago - now stable s/p 2 units PRBCs  · Continue Protonix 40mg IV BID  · GI consulted, appreciate assistance  · Plan for EGD 9/27 after Plavix washout, might need ENT clearance for trach  · Transfuse for Hemoglobin <7        Tracheostomy present  · On 5L trach collar at home      Acute blood loss anemia  · See Melena      Elevated brain natriuretic peptide (BNP) level  · BNP newly elevated at 468  · 2D echo with EF 55% and G1DD      PEG (percutaneous endoscopic gastrostomy) status  · See Dysphagia      Dysphagia  · S/p peg  · On TF - Dietary following      Other hyperlipidemia  · Chronic and stable  · Continue Statin      Primary hypertension  · Chronic issue  · Hold Metoprolol with GIB and softer BP      Coronary artery disease involving native coronary artery of native heart without angina pectoris  · Chronic and stable  · Hold Aspirin and Plavix with GIB  · Continue Statin  · Hold Metoprolol with softer BP      Squamous cell cancer of tongue  · S/p chemo, radiation  · Now with flap, trach, and peg    Peripheral vascular disease  · Chronic issue  · Hold Aspirin and Plavix with GIB  · Continue Statin        VTE Risk Mitigation (From admission, onward)         Ordered     IP VTE HIGH RISK PATIENT  Once         09/23/22 1409     Place sequential compression device  Until discontinued         09/23/22 1408                Discharge Planning   NISA: 9/28/2022     Code Status: Full Code   Is the patient medically ready for discharge?: No    Reason for patient still in hospital (select all that apply): Imaging                     Brenda Saldivar MD  Department of Hospital Medicine   [facility] - Telemetry Stepdown

## 2022-09-27 ENCOUNTER — ANESTHESIA (OUTPATIENT)
Dept: ENDOSCOPY | Facility: HOSPITAL | Age: 72
DRG: 205 | End: 2022-09-27
Payer: MEDICARE

## 2022-09-27 LAB
ANION GAP SERPL CALC-SCNC: 11 MMOL/L (ref 8–16)
BASOPHILS # BLD AUTO: 0.01 K/UL (ref 0–0.2)
BASOPHILS NFR BLD: 0.1 % (ref 0–1.9)
BUN SERPL-MCNC: 18 MG/DL (ref 8–23)
CALCIUM SERPL-MCNC: 9.1 MG/DL (ref 8.7–10.5)
CHLORIDE SERPL-SCNC: 98 MMOL/L (ref 95–110)
CO2 SERPL-SCNC: 27 MMOL/L (ref 23–29)
CREAT SERPL-MCNC: 0.8 MG/DL (ref 0.5–1.4)
DIFFERENTIAL METHOD: ABNORMAL
EOSINOPHIL # BLD AUTO: 0.3 K/UL (ref 0–0.5)
EOSINOPHIL NFR BLD: 4.2 % (ref 0–8)
ERYTHROCYTE [DISTWIDTH] IN BLOOD BY AUTOMATED COUNT: 16.4 % (ref 11.5–14.5)
EST. GFR  (NO RACE VARIABLE): >60 ML/MIN/1.73 M^2
ESTIMATED AVG GLUCOSE: 134 MG/DL (ref 68–131)
GLUCOSE SERPL-MCNC: 117 MG/DL (ref 70–110)
HBA1C MFR BLD: 6.3 % (ref 4–5.6)
HCT VFR BLD AUTO: 30.9 % (ref 40–54)
HGB BLD-MCNC: 9.4 G/DL (ref 14–18)
IMM GRANULOCYTES # BLD AUTO: 0.04 K/UL (ref 0–0.04)
IMM GRANULOCYTES NFR BLD AUTO: 0.5 % (ref 0–0.5)
LYMPHOCYTES # BLD AUTO: 0.6 K/UL (ref 1–4.8)
LYMPHOCYTES NFR BLD: 7.4 % (ref 18–48)
MCH RBC QN AUTO: 26 PG (ref 27–31)
MCHC RBC AUTO-ENTMCNC: 30.4 G/DL (ref 32–36)
MCV RBC AUTO: 86 FL (ref 82–98)
MONOCYTES # BLD AUTO: 0.7 K/UL (ref 0.3–1)
MONOCYTES NFR BLD: 9.8 % (ref 4–15)
NEUTROPHILS # BLD AUTO: 5.8 K/UL (ref 1.8–7.7)
NEUTROPHILS NFR BLD: 78 % (ref 38–73)
NRBC BLD-RTO: 0 /100 WBC
PLATELET # BLD AUTO: 324 K/UL (ref 150–450)
PMV BLD AUTO: 8.9 FL (ref 9.2–12.9)
POTASSIUM SERPL-SCNC: 4 MMOL/L (ref 3.5–5.1)
RBC # BLD AUTO: 3.61 M/UL (ref 4.6–6.2)
SODIUM SERPL-SCNC: 136 MMOL/L (ref 136–145)
WBC # BLD AUTO: 7.42 K/UL (ref 3.9–12.7)

## 2022-09-27 PROCEDURE — 99900035 HC TECH TIME PER 15 MIN (STAT)

## 2022-09-27 PROCEDURE — 37000009 HC ANESTHESIA EA ADD 15 MINS: Performed by: INTERNAL MEDICINE

## 2022-09-27 PROCEDURE — 25000242 PHARM REV CODE 250 ALT 637 W/ HCPCS: Performed by: HOSPITALIST

## 2022-09-27 PROCEDURE — 80048 BASIC METABOLIC PNL TOTAL CA: CPT | Performed by: HOSPITALIST

## 2022-09-27 PROCEDURE — C9113 INJ PANTOPRAZOLE SODIUM, VIA: HCPCS | Performed by: HOSPITALIST

## 2022-09-27 PROCEDURE — 99900026 HC AIRWAY MAINTENANCE (STAT)

## 2022-09-27 PROCEDURE — D9220A PRA ANESTHESIA: Mod: ANES,,, | Performed by: ANESTHESIOLOGY

## 2022-09-27 PROCEDURE — 63600175 PHARM REV CODE 636 W HCPCS: Performed by: HOSPITALIST

## 2022-09-27 PROCEDURE — 27000221 HC OXYGEN, UP TO 24 HOURS

## 2022-09-27 PROCEDURE — 36415 COLL VENOUS BLD VENIPUNCTURE: CPT | Performed by: HOSPITALIST

## 2022-09-27 PROCEDURE — 25000003 PHARM REV CODE 250: Performed by: HOSPITALIST

## 2022-09-27 PROCEDURE — 94761 N-INVAS EAR/PLS OXIMETRY MLT: CPT

## 2022-09-27 PROCEDURE — 20600001 HC STEP DOWN PRIVATE ROOM

## 2022-09-27 PROCEDURE — 99233 PR SUBSEQUENT HOSPITAL CARE,LEVL III: ICD-10-PCS | Mod: ,,, | Performed by: HOSPITALIST

## 2022-09-27 PROCEDURE — 45378 DIAGNOSTIC COLONOSCOPY: CPT | Mod: 53,,, | Performed by: INTERNAL MEDICINE

## 2022-09-27 PROCEDURE — 85025 COMPLETE CBC W/AUTO DIFF WBC: CPT | Performed by: HOSPITALIST

## 2022-09-27 PROCEDURE — 83036 HEMOGLOBIN GLYCOSYLATED A1C: CPT | Performed by: HOSPITALIST

## 2022-09-27 PROCEDURE — D9220A PRA ANESTHESIA: ICD-10-PCS | Mod: CRNA,,, | Performed by: NURSE ANESTHETIST, CERTIFIED REGISTERED

## 2022-09-27 PROCEDURE — 94640 AIRWAY INHALATION TREATMENT: CPT

## 2022-09-27 PROCEDURE — 25000003 PHARM REV CODE 250: Performed by: NURSE ANESTHETIST, CERTIFIED REGISTERED

## 2022-09-27 PROCEDURE — 45378 PR COLONOSCOPY,DIAGNOSTIC: ICD-10-PCS | Mod: 53,,, | Performed by: INTERNAL MEDICINE

## 2022-09-27 PROCEDURE — D9220A PRA ANESTHESIA: ICD-10-PCS | Mod: ANES,,, | Performed by: ANESTHESIOLOGY

## 2022-09-27 PROCEDURE — D9220A PRA ANESTHESIA: Mod: CRNA,,, | Performed by: NURSE ANESTHETIST, CERTIFIED REGISTERED

## 2022-09-27 PROCEDURE — 37000008 HC ANESTHESIA 1ST 15 MINUTES: Performed by: INTERNAL MEDICINE

## 2022-09-27 PROCEDURE — 63600175 PHARM REV CODE 636 W HCPCS: Performed by: NURSE ANESTHETIST, CERTIFIED REGISTERED

## 2022-09-27 PROCEDURE — 45378 DIAGNOSTIC COLONOSCOPY: CPT | Mod: 53 | Performed by: INTERNAL MEDICINE

## 2022-09-27 PROCEDURE — 99233 SBSQ HOSP IP/OBS HIGH 50: CPT | Mod: ,,, | Performed by: HOSPITALIST

## 2022-09-27 RX ORDER — SODIUM CHLORIDE 0.9 % (FLUSH) 0.9 %
3 SYRINGE (ML) INJECTION
Status: DISCONTINUED | OUTPATIENT
Start: 2022-09-27 | End: 2022-09-27 | Stop reason: HOSPADM

## 2022-09-27 RX ORDER — FUROSEMIDE 10 MG/ML
40 INJECTION INTRAMUSCULAR; INTRAVENOUS ONCE
Status: COMPLETED | OUTPATIENT
Start: 2022-09-27 | End: 2022-09-27

## 2022-09-27 RX ORDER — PROPOFOL 10 MG/ML
VIAL (ML) INTRAVENOUS
Status: DISCONTINUED | OUTPATIENT
Start: 2022-09-27 | End: 2022-09-27

## 2022-09-27 RX ORDER — LEVOFLOXACIN 750 MG/1
750 TABLET ORAL DAILY
Qty: 5 TABLET | Refills: 0 | Status: SHIPPED | OUTPATIENT
Start: 2022-09-28 | End: 2022-10-03

## 2022-09-27 RX ORDER — LIDOCAINE HYDROCHLORIDE 20 MG/ML
INJECTION, SOLUTION EPIDURAL; INFILTRATION; INTRACAUDAL; PERINEURAL
Status: DISCONTINUED | OUTPATIENT
Start: 2022-09-27 | End: 2022-09-27

## 2022-09-27 RX ORDER — PANTOPRAZOLE SODIUM 40 MG/1
40 FOR SUSPENSION ORAL 2 TIMES DAILY
Qty: 120 PACKET | Refills: 0 | Status: SHIPPED | OUTPATIENT
Start: 2022-09-27 | End: 2022-09-27

## 2022-09-27 RX ADMIN — LEVOFLOXACIN 750 MG: 750 TABLET, FILM COATED ORAL at 11:09

## 2022-09-27 RX ADMIN — TOBRAMYCIN 300 MG: 300 SOLUTION RESPIRATORY (INHALATION) at 08:09

## 2022-09-27 RX ADMIN — Medication 6 MG: at 08:09

## 2022-09-27 RX ADMIN — PREDNISONE 20 MG: 5 SOLUTION ORAL at 11:09

## 2022-09-27 RX ADMIN — ATORVASTATIN CALCIUM 20 MG: 20 TABLET, FILM COATED ORAL at 11:09

## 2022-09-27 RX ADMIN — PROPOFOL 50 MG: 10 INJECTION, EMULSION INTRAVENOUS at 10:09

## 2022-09-27 RX ADMIN — LIDOCAINE HYDROCHLORIDE 100 MG: 20 INJECTION, SOLUTION EPIDURAL; INFILTRATION; INTRACAUDAL; PERINEURAL at 10:09

## 2022-09-27 RX ADMIN — GLYCOPYRROLATE 2 MG: 1 LIQUID ORAL at 08:09

## 2022-09-27 RX ADMIN — PANTOPRAZOLE SODIUM 40 MG: 40 INJECTION, POWDER, FOR SOLUTION INTRAVENOUS at 08:09

## 2022-09-27 RX ADMIN — HYDROXYZINE HYDROCHLORIDE 25 MG: 25 TABLET, FILM COATED ORAL at 08:09

## 2022-09-27 RX ADMIN — GLYCOPYRROLATE 2 MG: 1 LIQUID ORAL at 02:09

## 2022-09-27 RX ADMIN — PANTOPRAZOLE SODIUM 40 MG: 40 INJECTION, POWDER, FOR SOLUTION INTRAVENOUS at 11:09

## 2022-09-27 RX ADMIN — FUROSEMIDE 40 MG: 10 INJECTION, SOLUTION INTRAMUSCULAR; INTRAVENOUS at 12:09

## 2022-09-27 RX ADMIN — SODIUM CHLORIDE: 9 INJECTION, SOLUTION INTRAVENOUS at 10:09

## 2022-09-27 NOTE — CARE UPDATE
Rt called to room because pt was desating. Pt was desating to to 86%. Suctioning thick white /yellow moderate secretion. Rt increased Fio2@ 35%with 8lpm o2

## 2022-09-27 NOTE — ASSESSMENT & PLAN NOTE
· GIB Pathway initiated  · Hgb 5.5 on admit, baseline around 8 as of 2 weeks ago - now stable s/p 2 units PRBCs  · Continue Protonix 40mg IV BID  · GI consulted, appreciate assistance  · EGD/cscope 9/27 aborted because of desats and poor prep  · Plan for repeat procedures end of the week when respiratory status is more optimized  · Transfuse for Hemoglobin <7

## 2022-09-27 NOTE — PROGRESS NOTES
[facility] - Telemetry [facility] Medicine  Progress Note    Patient Name: Fareed Richard Jr.  MRN: [id number]  Patient Class: IP- Inpatient   Admission Date: 9/23/2022  Length of Stay: 4 days  Attending Physician: Brenda Saldivar MD  Primary Care Provider: Kodi Tubbs MD        Subjective:     Principal Problem:Acute on chronic respiratory failure        HPI:  Mr. Fareed Richard Jr. is a 72 y.o. 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  History is obtained from wife at bedside.  She reports that at baseline, he is on 5L trach collar.  However, for the last 1-2 weeks he has been having worsening respiratory issues.  He is currently on a prolonged Prednisone taper.  He went to the [facility] ER on 9/14 for hemoptysis and bloody trach secretions.  Hemoglobin was 8.4.  At that encounter, he was evaluated by ENT who felt like his airway was patent.  His hemoptysis stopped before leaving the ER.  He was discharged with Augmentin for aspiration pneumonia seen on CT scan.  However, on the day of admission he complained of chest pain, shortness of breath, and worsening fatigue.  Wife also endorsed yellow/green and foul colored trach secretions, but no fever or chills.  She suctioned him, which did not help - so she had to increase his home oxygen from 5L to 10L.  EMS was called and suctioned him, getting his oxygen saturations to 100% on 5L NC.  He denies any dark stools to his knowledge, but is on Aspirin and Plavix.  Chart review showed multiple respiratory cultures with Pseudomonas, unclear if colonization.    Upon arrival to the ER, vitals were temp 98.2F, HR 87, /78 satting 100% on 10L trach collar, 60% FiO2.  Labs showed Hemoglobin 5.5 and .  CXR showed pulmonary vascular and interstitial markings with effusions, no  clear consolidation.  HAYLEE was positive for melena.  He was given 1L NS, Vanc, Zosyn.  2 units PRBCs were ordered and he was given IV PPI.  He was admitted to Hospital Medicine for further management.      Overview/Hospital Course:  Mr. Richard was admitted to Hospital Medicine for management of acute on chronic respiratory failure 2/2 tracheitis/pneumonia, as well as a GIB.  He was started transfused 2 units PRBCs, started on IV PPI and GI was consulted.  He was started on Zosyn and Tobramycin nebs for pneumonia/tracheitis.  Resp cx returned with pan sensitive Pseudomonas.  ID was consulted.  He was changed from Zosyn to Levaquin given the high AMRITA.  GI attempted EGD/cscope 9/27, but the prep was poor for his cscope, and he desatted requiring 8L with sedation, so neither procedure was performed.  Repeat CT chest was ordered, as well as intense RT intervention to optimize respiratory status so that GI can perform the procedures at the end of the week when respiratory status has been optimized and is more stable.      Interval History: No acute events overnight.  Desatted with sedation to 8L and poor bowel prep so procedures aborted.  Initially wanted to leave, but wife convinced him to stay.  Plan for CT scan, IV Lasix, aggressive RT to optimize lung function for procedures at the end of the week.  Hgb stable.    Review of Systems   Constitutional:  Negative for chills, fatigue and fever.   Respiratory:  Negative for cough and shortness of breath.    Cardiovascular:  Negative for chest pain, palpitations and leg swelling.   Gastrointestinal:  Negative for abdominal pain, diarrhea, nausea and vomiting.   Genitourinary:  Negative for dysuria and urgency.   Neurological:  Negative for dizziness and headaches.   All other systems reviewed and are negative.  Objective:     Vital Signs (Most Recent):  Temp: 99 °F (37.2 °C) (09/27/22 1023)  Pulse: 105 (09/27/22 1038)  Resp: (!) 31 (09/27/22 1038)  BP: 127/71 (09/27/22  1030)  SpO2: (!) 93 % (09/27/22 1038)   Vital Signs (24h Range):  Temp:  [97.9 °F (36.6 °C)-99 °F (37.2 °C)] 99 °F (37.2 °C)  Pulse:  [] 105  Resp:  [18-31] 31  SpO2:  [92 %-96 %] 93 %  BP: (100-172)/(63-85) 127/71     Weight: 59.9 kg (132 lb)  Body mass index is 23.38 kg/m².    Intake/Output Summary (Last 24 hours) at 9/27/2022 1254  Last data filed at 9/27/2022 1015  Gross per 24 hour   Intake 100 ml   Output 150 ml   Net -50 ml        Physical Exam  Constitutional:       Appearance: Normal appearance. He is well-developed.   HENT:      Head: Normocephalic and atraumatic.   Neck:      Comments: Trach  Cardiovascular:      Rate and Rhythm: Normal rate and regular rhythm.      Heart sounds: No murmur heard.  Pulmonary:      Effort: Pulmonary effort is normal. No respiratory distress.      Breath sounds: Normal breath sounds. No wheezing or rales.   Abdominal:      General: There is no distension.      Palpations: Abdomen is soft.      Tenderness: There is no abdominal tenderness.      Comments: Peg   Musculoskeletal:         General: No deformity.   Skin:     General: Skin is warm.      Coloration: Skin is not pale.   Neurological:      General: No focal deficit present.      Mental Status: He is alert and oriented to person, place, and time. Mental status is at baseline.       Significant Labs: All pertinent labs within the past 24 hours have been reviewed.  CBC:   Recent Labs   Lab 09/26/22  0345 09/27/22  0533   WBC 7.35 7.42   HGB 9.0* 9.4*   HCT 29.2* 30.9*    324       Respiratory Culture:   No results for input(s): GSRESP, RESPIRATORYC in the last 48 hours.      Significant Imaging: I have reviewed all pertinent imaging results/findings within the past 24 hours.      Assessment/Plan:      * Acute on chronic respiratory failure  · On 5L trach collar at home, but requiring 10L trach collar on admit, down to home 5L  · Likely 2/2 tracheitis and pneumonia  · Continue Prednisone taper from outpatient  - 20mg daily x 7 days, then 10mg x  7 days  · Repeat CT chest  · Aggressive RT  · Management as below    Pneumonia  · As above      Acute tracheitis  · Recently with hemoptysis and now yellow/green and foul smelling secretions from his trach and rhonchi - recent CT chest 9/14 with pneumonia  · Trach culture with Pseudomonas - Previous resp cx with Pseudomonas, unclear if colonization  · Started on Zosyn 9/23, changed to Levaquin by ID 9/24 because of high AMRITA.  Plan for 7 days (end date 10/1)  · Continue Tobra nebs  · Can consider ENT eval      Gastrointestinal hemorrhage with melena  · GIB Pathway initiated  · Hgb 5.5 on admit, baseline around 8 as of 2 weeks ago - now stable s/p 2 units PRBCs  · Continue Protonix 40mg IV BID  · GI consulted, appreciate assistance  · EGD/cscope 9/27 aborted because of desats and poor prep  · Plan for repeat procedures end of the week when respiratory status is more optimized  · Transfuse for Hemoglobin <7        Tracheostomy present  · On 5L trach collar at home      Acute blood loss anemia  · See Melena      Elevated brain natriuretic peptide (BNP) level  · BNP newly elevated at 468  · 2D echo with EF 55% and G1DD  · Lasix x 1 to help breathing      PEG (percutaneous endoscopic gastrostomy) status  · See Dysphagia      Dysphagia  · S/p peg  · On TF - Dietary following      Other hyperlipidemia  · Chronic and stable  · Continue Statin      Primary hypertension  · Chronic issue  · Hold Metoprolol with GIB and softer BP      Coronary artery disease involving native coronary artery of native heart without angina pectoris  · Chronic and stable  · Hold Aspirin and Plavix with GIB  · Continue Statin  · Hold Metoprolol with softer BP      Squamous cell cancer of tongue  · S/p chemo, radiation  · Now with flap, trach, and peg    Peripheral vascular disease  · Chronic issue  · Hold Aspirin and Plavix with GIB  · Continue Statin      VTE Risk Mitigation (From admission, onward)          Ordered     IP VTE HIGH RISK PATIENT  Once         09/23/22 1409     Place sequential compression device  Until discontinued         09/23/22 1408                Discharge Planning   NISA: 9/30/2022     Code Status: Full Code   Is the patient medically ready for discharge?: No    Reason for patient still in hospital (select all that apply): Patient trending condition  Discharge Plan A: Home with family                  Brenda Saldivar MD  Department of Hospital Medicine   [facility] - Telemetry Stepdown

## 2022-09-27 NOTE — H&P
Short Stay Endoscopy History and Physical    PCP - Kodi Tubbs MD    Procedure - EGD and colonoscopy  ASA - per anesthesia  Mallampati - per anesthesia  History of Anesthesia problems - no  Family history Anesthesia problems -  no   Plan of anesthesia - General    HPI:  This is a 72 y.o. male here for evaluation of :  anemia    ROS:  Constitutional: No fevers, chills  CV: No chest pain  Pulm: No shortness of breath  GI: see HPI  Derm: No rash    Medical History:  has a past medical history of Cancer, COPD (chronic obstructive pulmonary disease), Hyperlipidemia, Hypertension, and Pseudoaneurysm.    Surgical History:  has a past surgical history that includes Skin cancer excision; Coronary stent placement (2000); Eye surgery; Abdominal surgery; Carotid stent; femoral stents; Radical neck dissection (Left, 1/3/2022); Flap procedure (N/A, 1/3/2022); Glossectomy (N/A, 2022); Tracheotomy (N/A, 2022); Dissection of neck (Bilateral, 2022); Esophagogastroduodenoscopy w/ PEG (N/A, 2022); and Flap procedure (Right, 2022).    Family History: family history is not on file.. Otherwise no colon cancer, inflammatory bowel disease, or GI malignancies.    Social History:  reports that he quit smoking about 4 years ago. His smoking use included cigarettes. He started smoking about 59 years ago. He has a 80.00 pack-year smoking history. He has never used smokeless tobacco. He reports that he does not currently use alcohol after a past usage of about 3.0 standard drinks per week. He reports that he does not use drugs.    Review of patient's allergies indicates:  No Known Allergies    Medications:   Medications Prior to Admission   Medication Sig Dispense Refill Last Dose    [] amoxicillin-clavulanate 875-125mg (AUGMENTIN) 875-125 mg per tablet Take 1 tablet by mouth 2 (two) times daily. for 10 days 20 tablet 0     aspirin 81 MG Chew 1 tablet (81 mg total) by Per G Tube route once daily.  0      atorvastatin (LIPITOR) 20 MG tablet 1 tablet (20 mg total) by Per G Tube route once daily. 90 tablet 3     bisacodyL (DULCOLAX) 10 mg Supp Place 1 suppository (10 mg total) rectally daily as needed.  0     clopidogreL (PLAVIX) 75 mg tablet 1 tablet (75 mg total) by Per G Tube route once daily. 30 tablet 11     glycopyrrolate (CUVPOSA) 1 mg/5 mL (0.2 mg/mL) Soln Take 5 mLs (1 mg total) by mouth 3 (three) times daily as needed (TO REDUCE SECRETIONS). 473 mL 1     guaiFENesin 200 mg/5 mL Liqd Take 400 mg by mouth every 4 (four) hours as needed (for secretions). 473 mL 3     hydrOXYzine HCL (ATARAX) 25 MG tablet SMARTSI.5 Tablet(s) Gastro Tube Every 8 Hours PRN       melatonin (MELATIN) 3 mg tablet 2 tablets (6 mg total) by Per G Tube route nightly.  0     metoprolol succinate (TOPROL-XL) 200 MG 24 hr tablet Take 200 mg by mouth 2 (two) times daily.       ondansetron (ZOFRAN-ODT) 8 MG TbDL Take 1 tablet (8 mg total) by mouth every 8 (eight) hours as needed (nausea). (Patient not taking: Reported on 2022) 30 tablet 1     oxyCODONE (ROXICODONE) 5 mg/5 mL Soln 5 mLs (5 mg total) by Per G Tube route every 4 (four) hours as needed (Pain). 150 mL 0     polyethylene glycol (GLYCOLAX) 17 gram PwPk 17 g by Per G Tube route 2 (two) times daily.  0     prednisone 5 mg/5 mL Soln Take 20 mLs (20 mg total) by mouth once daily for 7 days, THEN 10 mLs (10 mg total) once daily for 7 days. 1120 mL 0     sodium chloride (OCEAN) 0.65 % nasal spray 1 spray by Nasal route as needed for Congestion.  12     WIXELA INHUB 250-50 mcg/dose diskus inhaler SMARTSI Puff(s) By Mouth Every 12 Hours            Physical Exam:    Vital Signs:   Vitals:    22 0935   BP: (!) 172/82   Pulse: 105   Resp: 20   Temp: 98 °F (36.7 °C)       General Appearance: Well appearing in no acute distress  Eyes:    No scleral icterus  ENT: lips and tongue normal, + trach in place with oxygen mask  Lungs: no use of accessory muscles  Heart:  normal rate,  regular rhythm  Abdomen: Soft, non tender, non distended   Extremities: no edema  Skin: No rash      Labs:  Lab Results   Component Value Date    WBC 7.42 09/27/2022    HGB 9.4 (L) 09/27/2022    HCT 30.9 (L) 09/27/2022     09/27/2022    ALT 13 09/23/2022    AST 13 09/23/2022     09/27/2022    K 4.0 09/27/2022    CL 98 09/27/2022    CREATININE 0.8 09/27/2022    BUN 18 09/27/2022    CO2 27 09/27/2022    TSH 2.279 08/24/2022    INR 1.0 09/14/2022    HGBA1C 6.3 (H) 09/27/2022       I have explained the risks and benefits of endoscopy procedures to the patient including but not limited to bleeding, perforation, infection, and death.  The patient was asked if they understand and allowed to ask any further questions to their satisfaction.    Jonathan Mancera MD

## 2022-09-27 NOTE — TRANSFER OF CARE
"Anesthesia Transfer of Care Note    Patient: Fareed Richard Jr.    Procedure(s) Performed: Procedure(s) (LRB):  EGD (ESOPHAGOGASTRODUODENOSCOPY) (N/A)  COLONOSCOPY (N/A)    Patient location: PACU    Anesthesia Type: general    Transport from OR: Transported from OR on room air with adequate spontaneous ventilation    Post pain: adequate analgesia    Post assessment: no apparent anesthetic complications    Post vital signs: stable    Level of consciousness: awake    Nausea/Vomiting: no nausea/vomiting    Complications: none    Transfer of care protocol was followed      Last vitals:   Visit Vitals  /70 (BP Location: Left arm, Patient Position: Lying)   Pulse 102   Temp 37.2 °C (99 °F) (Temporal)   Resp 20   Ht 5' 3" (1.6 m)   Wt 59.9 kg (132 lb)   SpO2 (!) 94%   BMI 23.38 kg/m²     "

## 2022-09-27 NOTE — ANESTHESIA POSTPROCEDURE EVALUATION
Anesthesia Post Evaluation    Patient: Fareed Richard Jr.    Procedure(s) Performed: Procedure(s) (LRB):  EGD (ESOPHAGOGASTRODUODENOSCOPY) (N/A)  COLONOSCOPY (N/A)    Final Anesthesia Type: general      Patient location during evaluation: PACU  Patient participation: Yes- Able to Participate  Level of consciousness: awake and alert  Post-procedure vital signs: reviewed and stable  Pain management: adequate  Airway patency: patent    PONV status at discharge: No PONV  Anesthetic complications: yes  Perioperative Events: other periop events (see comments)      Gwendolyn-operative Events Comments: Large amount of stool in colon and oxygen sats decreased to low 80s (baseline 90 on 5L trach collar) so case abandoned. Patient with known pneumonia & hypoxic episodes preop.   Cardiovascular status: blood pressure returned to baseline  Respiratory status: unassisted  Hydration status: euvolemic  Follow-up not needed.          Vitals Value Taken Time   /71 09/27/22 1032   Temp 37.2 °C (99 °F) 09/27/22 1023   Pulse 112 09/27/22 1047   Resp 11 09/27/22 1047   SpO2 88 % 09/27/22 1047   Vitals shown include unvalidated device data.      Event Time   Out of Recovery 10:51:00         Pain/Ирина Score: Ирина Score: 9 (9/27/2022 10:38 AM)

## 2022-09-27 NOTE — PROVATION PATIENT INSTRUCTIONS
Discharge Summary/Instructions after an Endoscopic Procedure  Patient Name: Fareed Richard  Patient MRN: [id number]  Patient YOB: 1950 Tuesday, September 27, 2022  Donnie Peterson MD  Dear patient,  As a result of recent federal legislation (The Federal Cures Act), you may   receive lab or pathology results from your procedure in your [facility]   account before your physician is able to contact you. Your physician or   their representative will relay the results to you with their   recommendations at their soonest availability.  Thank you,  RESTRICTIONS:  During your procedure today, you received medications for sedation.  These   medications may affect your judgment, balance and coordination.  Therefore,   for 24 hours, you have the following restrictions:   - DO NOT drive a car, operate machinery, make legal/financial decisions,   sign important papers or drink alcohol.    ACTIVITY:  Today: no heavy lifting, straining or running due to procedural   sedation/anesthesia.  The following day: return to full activity including work.  DIET:  Eat and drink normally unless instructed otherwise.     TREATMENT FOR COMMON SIDE EFFECTS:  - Mild abdominal pain, nausea, belching, bloating or excessive gas:  rest,   eat lightly and use a heating pad.  - Sore Throat: treat with throat lozenges and/or gargle with warm salt   water.  - Because air was used during the procedure, expelling large amounts of air   from your rectum or belching is normal.  - If a bowel prep was taken, you may not have a bowel movement for 1-3 days.    This is normal.  SYMPTOMS TO WATCH FOR AND REPORT TO YOUR PHYSICIAN:  1. Abdominal pain or bloating, other than gas cramps.  2. Chest pain.  3. Back pain.  4. Signs of infection such as: chills or fever occurring within 24 hours   after the procedure.  5. Rectal bleeding, which would show as bright red, maroon, or black stools.   (A tablespoon of blood from the rectum is not serious, especially if    hemorrhoids are present.)  6. Vomiting.  7. Weakness or dizziness.  GO DIRECTLY TO THE NEAREST EMERGENCY ROOM IF YOU HAVE ANY OF THE FOLLOWING:      Difficulty breathing              Chills and/or fever over 101 F   Persistent vomiting and/or vomiting blood   Severe abdominal pain   Severe chest pain   Black, tarry stools   Bleeding- more than one tablespoon   Any other symptom or condition that you feel may need urgent attention  Your doctor recommends these additional instructions:  If any biopsies were taken, your doctors clinic will contact you in 1 to 2   weeks with any results.  - Return patient to hospital [facility] for ongoing care.   - The findings and recommendations were discussed with the referring   physician.  For questions, problems or results please call your physician - Donnie Peterson MD at Work:  [phone].  [facility], EMERGENCY ROOM PHONE NUMBER: [phone]  IF A COMPLICATION OR EMERGENCY SITUATION ARISES AND YOU ARE UNABLE TO REACH   YOUR PHYSICIAN - GO DIRECTLY TO THE EMERGENCY ROOM.  Donnie Peterson MD  9/27/2022 10:19:26 AM  This report has been verified and signed electronically.  Dear patient,  As a result of recent federal legislation (The Federal Cures Act), you may   receive lab or pathology results from your procedure in your [facility]   account before your physician is able to contact you. Your physician or   their representative will relay the results to you with their   recommendations at their soonest availability.  Thank you,  PROVATION

## 2022-09-27 NOTE — NURSING TRANSFER
Nursing Transfer Note      9/27/2022     Reason patient is being transferred: Out of PACU    Transfer To: 8096    Transfer via stretcher    Transfer with 8 L to O2    Transported by PCT    Medicines sent: N/A    Any special needs or follow-up needed: No    Chart send with patient: Yes    Notified: Patient states he will notify family himself when he returns back to room    Patient reassessed at: 1033

## 2022-09-27 NOTE — TREATMENT PLAN
GI Treatment Plan:    Impression:            - Preparation of the colon was inadequate.                          - Stool in the rectum.                          - No specimens collected.                          - procedure aborted due to solid stool in rectum                          AND hypoxia with sedation.       Melecio Wheeler MD  Gastroenterology/Hepatology, PGY IV  [facility]

## 2022-09-27 NOTE — SUBJECTIVE & OBJECTIVE
Interval History: No acute events overnight.  Desatted with sedation to 8L and poor bowel prep so procedures aborted.  Initially wanted to leave, but wife convinced him to stay.  Plan for CT scan, IV Lasix, aggressive RT to optimize lung function for procedures at the end of the week.  Hgb stable.    Review of Systems   Constitutional:  Negative for chills, fatigue and fever.   Respiratory:  Negative for cough and shortness of breath.    Cardiovascular:  Negative for chest pain, palpitations and leg swelling.   Gastrointestinal:  Negative for abdominal pain, diarrhea, nausea and vomiting.   Genitourinary:  Negative for dysuria and urgency.   Neurological:  Negative for dizziness and headaches.   All other systems reviewed and are negative.  Objective:     Vital Signs (Most Recent):  Temp: 99 °F (37.2 °C) (09/27/22 1023)  Pulse: 105 (09/27/22 1038)  Resp: (!) 31 (09/27/22 1038)  BP: 127/71 (09/27/22 1030)  SpO2: (!) 93 % (09/27/22 1038)   Vital Signs (24h Range):  Temp:  [97.9 °F (36.6 °C)-99 °F (37.2 °C)] 99 °F (37.2 °C)  Pulse:  [] 105  Resp:  [18-31] 31  SpO2:  [92 %-96 %] 93 %  BP: (100-172)/(63-85) 127/71     Weight: 59.9 kg (132 lb)  Body mass index is 23.38 kg/m².    Intake/Output Summary (Last 24 hours) at 9/27/2022 1254  Last data filed at 9/27/2022 1015  Gross per 24 hour   Intake 100 ml   Output 150 ml   Net -50 ml        Physical Exam  Constitutional:       Appearance: Normal appearance. He is well-developed.   HENT:      Head: Normocephalic and atraumatic.   Neck:      Comments: Trach  Cardiovascular:      Rate and Rhythm: Normal rate and regular rhythm.      Heart sounds: No murmur heard.  Pulmonary:      Effort: Pulmonary effort is normal. No respiratory distress.      Breath sounds: Normal breath sounds. No wheezing or rales.   Abdominal:      General: There is no distension.      Palpations: Abdomen is soft.      Tenderness: There is no abdominal tenderness.      Comments: Peg    Musculoskeletal:         General: No deformity.   Skin:     General: Skin is warm.      Coloration: Skin is not pale.   Neurological:      General: No focal deficit present.      Mental Status: He is alert and oriented to person, place, and time. Mental status is at baseline.       Significant Labs: All pertinent labs within the past 24 hours have been reviewed.  CBC:   Recent Labs   Lab 09/26/22  0345 09/27/22  0533   WBC 7.35 7.42   HGB 9.0* 9.4*   HCT 29.2* 30.9*    324       Respiratory Culture:   No results for input(s): GSRESP, RESPIRATORYC in the last 48 hours.      Significant Imaging: I have reviewed all pertinent imaging results/findings within the past 24 hours.

## 2022-09-27 NOTE — PLAN OF CARE
Problem: Adjustment to Illness (Gastrointestinal Bleeding)  Goal: Optimal Coping with Acute Illness  Outcome: Ongoing, Progressing     Problem: Bleeding (Gastrointestinal Bleeding)  Goal: Hemostasis  Outcome: Ongoing, Progressing     Problem: Adult Inpatient Plan of Care  Goal: Plan of Care Review  Outcome: Ongoing, Progressing  Goal: Patient-Specific Goal (Individualized)  Outcome: Ongoing, Progressing  Goal: Absence of Hospital-Acquired Illness or Injury  Outcome: Ongoing, Progressing  Goal: Optimal Comfort and Wellbeing  Outcome: Ongoing, Progressing  Goal: Readiness for Transition of Care  Outcome: Ongoing, Progressing     Problem: Fluid Imbalance (Pneumonia)  Goal: Fluid Balance  Outcome: Ongoing, Progressing     Problem: Infection (Pneumonia)  Goal: Resolution of Infection Signs and Symptoms  Outcome: Ongoing, Progressing     Problem: Respiratory Compromise (Pneumonia)  Goal: Effective Oxygenation and Ventilation  Outcome: Ongoing, Progressing     Problem: Impaired Wound Healing  Goal: Optimal Wound Healing  Outcome: Ongoing, Progressing    Pt went down for EGD, but the procedure was unable to be completed. Pt also went down for a chest CT. Pt and his wife met with Yariel CARPIO during the shift. Pt remained free of injuries during shift.

## 2022-09-27 NOTE — ASSESSMENT & PLAN NOTE
· On 5L trach collar at home, but requiring 10L trach collar on admit, down to home 5L  · Likely 2/2 tracheitis and pneumonia  · Continue Prednisone taper from outpatient - 20mg daily x 7 days, then 10mg x  7 days  · Repeat CT chest  · Aggressive RT  · Management as below

## 2022-09-27 NOTE — PT/OT/SLP PROGRESS
Physical Therapy      Patient Name:  Fareed Richard Jr.   MRN:  [id number]    Patient not seen today secondary to pt off floor for a procedure first attempt. I was unable to return for a second attempt. Will follow-up per PT POC.

## 2022-09-27 NOTE — PROVATION PATIENT INSTRUCTIONS
Discharge Summary/Instructions after an Endoscopic Procedure  Patient Name: Fareed Richard  Patient MRN: [id number]  Patient YOB: 1950 Tuesday, September 27, 2022  Donnie Peterson MD  Dear patient,  As a result of recent federal legislation (The Federal Cures Act), you may   receive lab or pathology results from your procedure in your [facility]   account before your physician is able to contact you. Your physician or   their representative will relay the results to you with their   recommendations at their soonest availability.  Thank you,  RESTRICTIONS:  During your procedure today, you received medications for sedation.  These   medications may affect your judgment, balance and coordination.  Therefore,   for 24 hours, you have the following restrictions:   - DO NOT drive a car, operate machinery, make legal/financial decisions,   sign important papers or drink alcohol.    ACTIVITY:  Today: no heavy lifting, straining or running due to procedural   sedation/anesthesia.  The following day: return to full activity including work.  DIET:  Eat and drink normally unless instructed otherwise.     TREATMENT FOR COMMON SIDE EFFECTS:  - Mild abdominal pain, nausea, belching, bloating or excessive gas:  rest,   eat lightly and use a heating pad.  - Sore Throat: treat with throat lozenges and/or gargle with warm salt   water.  - Because air was used during the procedure, expelling large amounts of air   from your rectum or belching is normal.  - If a bowel prep was taken, you may not have a bowel movement for 1-3 days.    This is normal.  SYMPTOMS TO WATCH FOR AND REPORT TO YOUR PHYSICIAN:  1. Abdominal pain or bloating, other than gas cramps.  2. Chest pain.  3. Back pain.  4. Signs of infection such as: chills or fever occurring within 24 hours   after the procedure.  5. Rectal bleeding, which would show as bright red, maroon, or black stools.   (A tablespoon of blood from the rectum is not serious, especially if    hemorrhoids are present.)  6. Vomiting.  7. Weakness or dizziness.  GO DIRECTLY TO THE NEAREST EMERGENCY ROOM IF YOU HAVE ANY OF THE FOLLOWING:      Difficulty breathing              Chills and/or fever over 101 F   Persistent vomiting and/or vomiting blood   Severe abdominal pain   Severe chest pain   Black, tarry stools   Bleeding- more than one tablespoon   Any other symptom or condition that you feel may need urgent attention  Your doctor recommends these additional instructions:  If any biopsies were taken, your doctors clinic will contact you in 1 to 2   weeks with any results.  - Return patient to hospital [facility] for ongoing care.   - Repeat colonoscopy at appointment to be scheduled because the bowel   preparation was poor. Will need pulmonary optimization before   reattempting.  - The findings and recommendations were discussed with the designated   responsible adult.   For questions, problems or results please call your physician - Donnie Peterson MD at Work:  [phone].  [facility], EMERGENCY ROOM PHONE NUMBER: [phone]  IF A COMPLICATION OR EMERGENCY SITUATION ARISES AND YOU ARE UNABLE TO REACH   YOUR PHYSICIAN - GO DIRECTLY TO THE EMERGENCY ROOM.  Donnie Peterson MD  9/27/2022 10:17:13 AM  This report has been verified and signed electronically.  Dear patient,  As a result of recent federal legislation (The Federal Cures Act), you may   receive lab or pathology results from your procedure in your [facility]   account before your physician is able to contact you. Your physician or   their representative will relay the results to you with their   recommendations at their soonest availability.  Thank you,  PROVATION

## 2022-09-28 LAB
BACTERIA BLD CULT: NORMAL
BACTERIA BLD CULT: NORMAL
BASOPHILS # BLD AUTO: 0.01 K/UL (ref 0–0.2)
BASOPHILS NFR BLD: 0.1 % (ref 0–1.9)
DIFFERENTIAL METHOD: ABNORMAL
EOSINOPHIL # BLD AUTO: 0.3 K/UL (ref 0–0.5)
EOSINOPHIL NFR BLD: 4.2 % (ref 0–8)
ERYTHROCYTE [DISTWIDTH] IN BLOOD BY AUTOMATED COUNT: 16.1 % (ref 11.5–14.5)
HCT VFR BLD AUTO: 28.3 % (ref 40–54)
HGB BLD-MCNC: 8.5 G/DL (ref 14–18)
IMM GRANULOCYTES # BLD AUTO: 0.05 K/UL (ref 0–0.04)
IMM GRANULOCYTES NFR BLD AUTO: 0.7 % (ref 0–0.5)
LYMPHOCYTES # BLD AUTO: 0.4 K/UL (ref 1–4.8)
LYMPHOCYTES NFR BLD: 5.2 % (ref 18–48)
MCH RBC QN AUTO: 25.8 PG (ref 27–31)
MCHC RBC AUTO-ENTMCNC: 30 G/DL (ref 32–36)
MCV RBC AUTO: 86 FL (ref 82–98)
MONOCYTES # BLD AUTO: 0.6 K/UL (ref 0.3–1)
MONOCYTES NFR BLD: 8.2 % (ref 4–15)
NEUTROPHILS # BLD AUTO: 6 K/UL (ref 1.8–7.7)
NEUTROPHILS NFR BLD: 81.6 % (ref 38–73)
NRBC BLD-RTO: 0 /100 WBC
PLATELET # BLD AUTO: 281 K/UL (ref 150–450)
PMV BLD AUTO: 8.8 FL (ref 9.2–12.9)
RBC # BLD AUTO: 3.3 M/UL (ref 4.6–6.2)
WBC # BLD AUTO: 7.32 K/UL (ref 3.9–12.7)

## 2022-09-28 PROCEDURE — 36415 COLL VENOUS BLD VENIPUNCTURE: CPT | Performed by: HOSPITALIST

## 2022-09-28 PROCEDURE — 99233 PR SUBSEQUENT HOSPITAL CARE,LEVL III: ICD-10-PCS | Mod: ,,, | Performed by: HOSPITALIST

## 2022-09-28 PROCEDURE — 97530 THERAPEUTIC ACTIVITIES: CPT

## 2022-09-28 PROCEDURE — C9113 INJ PANTOPRAZOLE SODIUM, VIA: HCPCS | Performed by: HOSPITALIST

## 2022-09-28 PROCEDURE — 27000221 HC OXYGEN, UP TO 24 HOURS

## 2022-09-28 PROCEDURE — 20600001 HC STEP DOWN PRIVATE ROOM

## 2022-09-28 PROCEDURE — 94761 N-INVAS EAR/PLS OXIMETRY MLT: CPT

## 2022-09-28 PROCEDURE — 94668 MNPJ CHEST WALL SBSQ: CPT

## 2022-09-28 PROCEDURE — 99900035 HC TECH TIME PER 15 MIN (STAT)

## 2022-09-28 PROCEDURE — 25000242 PHARM REV CODE 250 ALT 637 W/ HCPCS: Performed by: PHYSICIAN ASSISTANT

## 2022-09-28 PROCEDURE — 85025 COMPLETE CBC W/AUTO DIFF WBC: CPT | Performed by: HOSPITALIST

## 2022-09-28 PROCEDURE — 63600175 PHARM REV CODE 636 W HCPCS: Performed by: HOSPITALIST

## 2022-09-28 PROCEDURE — 97530 THERAPEUTIC ACTIVITIES: CPT | Mod: CO

## 2022-09-28 PROCEDURE — 99900026 HC AIRWAY MAINTENANCE (STAT)

## 2022-09-28 PROCEDURE — 25000003 PHARM REV CODE 250: Performed by: HOSPITALIST

## 2022-09-28 PROCEDURE — 25000242 PHARM REV CODE 250 ALT 637 W/ HCPCS: Performed by: HOSPITALIST

## 2022-09-28 PROCEDURE — 97535 SELF CARE MNGMENT TRAINING: CPT | Mod: CO

## 2022-09-28 PROCEDURE — 99233 SBSQ HOSP IP/OBS HIGH 50: CPT | Mod: ,,, | Performed by: HOSPITALIST

## 2022-09-28 PROCEDURE — 94640 AIRWAY INHALATION TREATMENT: CPT

## 2022-09-28 PROCEDURE — 97116 GAIT TRAINING THERAPY: CPT

## 2022-09-28 RX ORDER — METOPROLOL SUCCINATE 100 MG/1
200 TABLET, EXTENDED RELEASE ORAL 2 TIMES DAILY
Status: DISCONTINUED | OUTPATIENT
Start: 2022-09-28 | End: 2022-09-30 | Stop reason: HOSPADM

## 2022-09-28 RX ORDER — IPRATROPIUM BROMIDE AND ALBUTEROL SULFATE 2.5; .5 MG/3ML; MG/3ML
3 SOLUTION RESPIRATORY (INHALATION) EVERY 4 HOURS PRN
Status: DISCONTINUED | OUTPATIENT
Start: 2022-09-28 | End: 2022-09-30 | Stop reason: HOSPADM

## 2022-09-28 RX ADMIN — METOPROLOL SUCCINATE 200 MG: 100 TABLET, EXTENDED RELEASE ORAL at 08:09

## 2022-09-28 RX ADMIN — PREDNISONE 20 MG: 5 SOLUTION ORAL at 08:09

## 2022-09-28 RX ADMIN — LEVOFLOXACIN 750 MG: 750 TABLET, FILM COATED ORAL at 08:09

## 2022-09-28 RX ADMIN — TOBRAMYCIN 300 MG: 300 SOLUTION RESPIRATORY (INHALATION) at 08:09

## 2022-09-28 RX ADMIN — TOBRAMYCIN 300 MG: 300 SOLUTION RESPIRATORY (INHALATION) at 09:09

## 2022-09-28 RX ADMIN — HYDROXYZINE HYDROCHLORIDE 25 MG: 25 TABLET, FILM COATED ORAL at 08:09

## 2022-09-28 RX ADMIN — GLYCOPYRROLATE 2 MG: 1 LIQUID ORAL at 08:09

## 2022-09-28 RX ADMIN — Medication 6 MG: at 08:09

## 2022-09-28 RX ADMIN — ATORVASTATIN CALCIUM 20 MG: 20 TABLET, FILM COATED ORAL at 08:09

## 2022-09-28 RX ADMIN — IPRATROPIUM BROMIDE AND ALBUTEROL SULFATE 3 ML: 2.5; .5 SOLUTION RESPIRATORY (INHALATION) at 03:09

## 2022-09-28 RX ADMIN — PANTOPRAZOLE SODIUM 40 MG: 40 INJECTION, POWDER, FOR SOLUTION INTRAVENOUS at 08:09

## 2022-09-28 NOTE — PT/OT/SLP PROGRESS
Physical Therapy Treatment    Patient Name:  Fareed Richard Jr.   MRN:  [id number]    Recommendations:     Discharge Recommendations:  home health PT   Discharge Equipment Recommendations: 3-in-1 commode, shower chair   Barriers to discharge: None    Assessment:     Fareed Richard Jr. is a 72 y.o. male admitted with a medical diagnosis of Acute on chronic respiratory failure.  He presents with the following impairments/functional limitations:  gait instability, weakness, impaired endurance, impaired self care skills, impaired functional mobility, decreased lower extremity function, impaired cardiopulmonary response to activity. The patient is very motivated to work with PT and mobilize. He performed bed mobility with stand by assistance, sit to stand with stand by assistance. He ambulated 75' with RW and stand by assistance, narrow KATIE, gait tolerance limited by fatigue. PTA he was independent with gait and ambulating without AD. He would benefit from HHPT to address the above deficits.     Rehab Prognosis: Good; patient would benefit from acute skilled PT services to address these deficits and reach maximum level of function.    Recent Surgery: Procedure(s) (LRB):  EGD (ESOPHAGOGASTRODUODENOSCOPY) (N/A)  COLONOSCOPY (N/A) 1 Day Post-Op    Plan:     During this hospitalization, patient to be seen 3 x/week to address the identified rehab impairments via gait training, therapeutic activities, therapeutic exercises, neuromuscular re-education and progress toward the following goals:    Plan of Care Expires:  10/08/22    Subjective     Chief Complaint: endorses going stir crazy being in the room, frustrated at being stuck in bed since PT session on Saturday  Patient/Family Comments/goals: return to PLOF, mobilize, return home  Pain/Comfort:  Pain Rating 1: 0/10      Objective:     Communicated with RN prior to session.  Patient found HOB elevated with telemetry, Tracheostomy, peripheral IV, pulse ox (continuous), PEG Tube,  oxygen upon PT entry to room. Patient with aerogen device in line with trach- respiratory present and ok'd disconnecting tubing during therapy session.     General Precautions: Standard, fall   Orthopedic Precautions:N/A   Braces: N/A  Respiratory Status:  Trach collar, 5L     Functional Mobility:    Bed Mobility  Supine to Sit on the R side:  stand by assistance    Transfers Sit to Stand:  stand by assistance with RW from EOB   Gait  Gait Distance: 75 ft with RW  Assistance Level: stand by assistance   Description: narrow KATIE, decreased gait speed, decreased step length, kyphosis, ambulating outside KATIE          AM-PAC 6 CLICK MOBILITY  Turning over in bed (including adjusting bedclothes, sheets and blankets)?: 4  Sitting down on and standing up from a chair with arms (e.g., wheelchair, bedside commode, etc.): 4  Moving from lying on back to sitting on the side of the bed?: 4  Moving to and from a bed to a chair (including a wheelchair)?: 4  Need to walk in hospital room?: 3  Climbing 3-5 steps with a railing?: 3  Basic Mobility Total Score: 22       Therapeutic Activities and Exercises:   Patient educated on role of therapy, goals of session, benefits of out of bed mobility. Patient agreeable to mobilize with therapy.       Gait training: cued for ambulating inside RW KATIE, widening KATIE, reciprocal strides, increased foot clearance, cued for pacing    Patient educated on PT schedule.  Encouraged patient to ambulate, sit up in chair 3x/day to prevent deconditioning during hospitalization. Patient verbalized understanding and agreement not to mobilize without RN assist. Patient in agreement with PT POC,HHPT.    Patient is safe to ambulate with RW and 1 person RN assist. RW retrieved for patient's room. PCT alerted to patient's level of mobility and need to sit up in chair/ambulate daily. Patient is aware to advocate for himself to walk with RN staff assist.       Patient left up in chair with all lines intact, call  button in reach, and PCT present..    GOALS:   Multidisciplinary Problems       Physical Therapy Goals          Problem: Physical Therapy    Goal Priority Disciplines Outcome Goal Variances Interventions   Physical Therapy Goal     PT, PT/OT Ongoing, Progressing     Description: Goals to be met by: 10/8/2022      Patient will increase functional independence with mobility by performin. Supine to sit with Modified [place]  2. Sit to stand transfer with Modified [place]  3. Gait  x 150 feet with Modified [place] using the least restrictive device.                           Time Tracking:     PT Received On: 22  PT Start Time: 907     PT Stop Time: 945  PT Total Time (min): 38 min     Billable Minutes: Gait Training 25 and Therapeutic Activity 13    Treatment Type: Treatment  PT/PTA: PT     PTA Visit Number: 0     2022

## 2022-09-28 NOTE — ASSESSMENT & PLAN NOTE
· On 5L trach collar at home, but requiring 10L trach collar on admit, down to home 5L  · Likely 2/2 tracheitis and pneumonia  · Continue Prednisone taper from outpatient - 20mg daily x 7 days, then 10mg x  7 days  · Repeat CT chest showed multifocal pneumonia  · Aggressive RT  · Management as below

## 2022-09-28 NOTE — PT/OT/SLP PROGRESS
Occupational Therapy   Treatment    Name: Fareed Richard Jr.  MRN: [id number]  Admitting Diagnosis:  Acute on chronic respiratory failure  1 Day Post-Op  Procedure(s):  EGD (ESOPHAGOGASTRODUODENOSCOPY)  COLONOSCOPY     Recommendations:     Discharge Recommendations: home  Discharge Equipment Recommendations:  3-in-1 commode, shower chair  Barriers to discharge:  None    Assessment:     Fareed Richard Jr. is a 72 y.o. male with a medical diagnosis of Acute on chronic respiratory failure.  He presents with the following performance deficits affecting function are weakness, impaired endurance, impaired self care skills, impaired functional mobility, gait instability, decreased safety awareness, impaired cardiopulmonary response to activity, impaired balance    Patient agreeable to OT session and tolerated fair secondary to deconditioning. Patient is primarily limited by SOB today, requiring prolonged seated rest break post functional t/f training. Patient goals remain appropriate at this time. Continue OT POC.    Rehab Prognosis:  Good; patient would benefit from acute skilled OT services to address these deficits and reach maximum level of function.       Plan:     Patient to be seen 3 x/week to address the above listed problems via self-care/home management, therapeutic activities, therapeutic exercises, community/work re-entry  Plan of Care Expires: 10/26/22  Plan of Care Reviewed with: patient    Subjective   Patient reported his wife helps him with all his needs.     Pain/Comfort:  Pain Rating 1: 0/10  Pain Rating Post-Intervention 1: 0/10    Objective:     Communicated with: RN and OTR prior to session.  Patient found up in chair with Tracheostomy, telemetry, blood pressure cuff, PEG Tube, pulse ox (continuous), oxygen upon OT entry to room.  A client care conference was completed by the OTR and the MOSS prior to treatment by the MOSS to discuss the patient's POC and current status.    General Precautions: Standard,  fall   Orthopedic Precautions:N/A   Braces: N/A  Respiratory Status:  Trache 5L O2     Occupational Performance:     Bed Mobility:    Did not assess. Patient received OOB    Functional Mobility/Transfers:  Patient completed Sit <> Stand Transfer with contact guard assistance  with  rolling walker   Patient completed Chair Transfer using Step Transfer technique with contact guard assistance with rolling walker  Patient completed Toilet Transfer Step Transfer technique with contact guard assistance with  rolling walker and grab bars  Functional Mobility: to/from bathroom (~10' x2) using RW with CGA progressing to SBA with portable O2 tank and IV pole in tow    Activities of Daily Living:  Grooming: contact guard assistance for dynamic standing balance and decreased endurance to complete hand hygiene standing sink side      AMPA 6 Click ADL: 20    Treatment & Education:  Functional t/f training as indicated above  Education on DME recs, fall prevention strategies, and energy conservation  Time provided for therapeutic counseling and discussion of health disposition  Addressed all patient questions/concerns within NELLY scope of practice.     Patient left up in chair with all lines intact, call button in reach, and RT and RN notified    GOALS:   Multidisciplinary Problems       Occupational Therapy Goals          Problem: Occupational Therapy    Goal Priority Disciplines Outcome Interventions   Occupational Therapy Goal     OT, PT/OT Ongoing, Progressing    Description: Goals to be met by: 10/3/22     Patient will increase functional independence with ADLs by performing:    Grooming while standing at sink with Contact Guard Assistance.  Toileting from bedside commode with Contact Guard Assistance for hygiene and clothing management.   Toilet transfer to bedside commode with Contact Guard Assistance.  Pt. Will implement energy conservation techniques with bed mobilitywith at least 2 verbal cue to improve quality of  life.  Pt. Will verbalize an understanding of energy conservation technique handout using teach back method                        Time Tracking:     OT Date of Treatment: 09/28/22  OT Start Time: 1516  OT Stop Time: 1549  OT Total Time (min): 33 min    Billable Minutes:Self Care/Home Management 11  Therapeutic Activity 22    OT/NELLY: NELLY VILLEGAS Visit Number: 1    9/28/2022

## 2022-09-28 NOTE — ASSESSMENT & PLAN NOTE
· Chronic and stable  · Hold Aspirin and Plavix with GIB  · Continue Statin  · Restart Metoprolol 200mg daily given BP 150s

## 2022-09-28 NOTE — SUBJECTIVE & OBJECTIVE
Interval History: No acute events overnight.  Feels much better with RT.  Breathing better, secretions improving.  BP up to 150s so will restart Metoprolol.    Review of Systems   Constitutional:  Negative for chills, fatigue and fever.   Respiratory:  Negative for cough and shortness of breath.    Cardiovascular:  Negative for chest pain, palpitations and leg swelling.   Gastrointestinal:  Negative for abdominal pain, diarrhea, nausea and vomiting.   Genitourinary:  Negative for dysuria and urgency.   Neurological:  Negative for dizziness and headaches.   All other systems reviewed and are negative.  Objective:     Vital Signs (Most Recent):  Temp: 97.9 °F (36.6 °C) (09/28/22 0857)  Pulse: 97 (09/28/22 0857)  Resp: 20 (09/28/22 0857)  BP: 133/86 (09/28/22 0859)  SpO2: 95 % (09/28/22 0857)   Vital Signs (24h Range):  Temp:  [97.6 °F (36.4 °C)-98 °F (36.7 °C)] 97.9 °F (36.6 °C)  Pulse:  [] 97  Resp:  [14-31] 20  SpO2:  [93 %-100 %] 95 %  BP: ()/(57-86) 133/86     Weight: 59.9 kg (132 lb)  Body mass index is 23.38 kg/m².    Intake/Output Summary (Last 24 hours) at 9/28/2022 1030  Last data filed at 9/28/2022 0640  Gross per 24 hour   Intake --   Output 900 ml   Net -900 ml        Physical Exam  Constitutional:       Appearance: Normal appearance. He is well-developed.   HENT:      Head: Normocephalic and atraumatic.   Neck:      Comments: Trach  Cardiovascular:      Rate and Rhythm: Normal rate and regular rhythm.      Heart sounds: No murmur heard.  Pulmonary:      Effort: Pulmonary effort is normal. No respiratory distress.      Breath sounds: Normal breath sounds. No wheezing or rales.   Abdominal:      General: There is no distension.      Palpations: Abdomen is soft.      Tenderness: There is no abdominal tenderness.      Comments: Peg   Musculoskeletal:         General: No deformity.   Skin:     General: Skin is warm.      Coloration: Skin is not pale.   Neurological:      General: No focal deficit  present.      Mental Status: He is alert and oriented to person, place, and time. Mental status is at baseline.       Significant Labs: All pertinent labs within the past 24 hours have been reviewed.  CBC:   Recent Labs   Lab 09/27/22  0533 09/28/22  0658   WBC 7.42 7.32   HGB 9.4* 8.5*   HCT 30.9* 28.3*    281       Respiratory Culture:   No results for input(s): GSRESP, RESPIRATORYC in the last 48 hours.      Significant Imaging: I have reviewed all pertinent imaging results/findings within the past 24 hours.

## 2022-09-28 NOTE — PLAN OF CARE
Problem: Adjustment to Illness (Gastrointestinal Bleeding)  Goal: Optimal Coping with Acute Illness  Outcome: Ongoing, Progressing     Problem: Bleeding (Gastrointestinal Bleeding)  Goal: Hemostasis  Outcome: Ongoing, Progressing     Problem: Adult Inpatient Plan of Care  Goal: Plan of Care Review  Outcome: Ongoing, Progressing  Goal: Patient-Specific Goal (Individualized)  Outcome: Ongoing, Progressing   Pt rested well. VS WNL. No ss of pain. PRN breathing treatments ordered. Call light within reach. Bed in low position. Will continue to monitor and pass on care to oncoming nurse.

## 2022-09-28 NOTE — PLAN OF CARE
Problem: Occupational Therapy  Goal: Occupational Therapy Goal  Description: Goals to be met by: 10/3/22     Patient will increase functional independence with ADLs by performing:    Grooming while standing at sink with Contact Guard Assistance. ONGOING  Toileting from bedside commode with Contact Guard Assistance for hygiene and clothing management.   Toilet transfer to bedside commode with Contact Guard Assistance. MET 9/28  Pt. Will implement energy conservation techniques with bed mobilitywith at least 2 verbal cue to improve quality of life.  Pt. Will verbalize an understanding of energy conservation technique handout using teach back method   Outcome: Ongoing, Progressing

## 2022-09-28 NOTE — PROGRESS NOTES
[facility] - Telemetry [facility] Medicine  Progress Note    Patient Name: Fareed Richard Jr.  MRN: [id number]  Patient Class: IP- Inpatient   Admission Date: 9/23/2022  Length of Stay: 5 days  Attending Physician: Brenda Saldivar MD  Primary Care Provider: Kodi Tubbs MD        Subjective:     Principal Problem:Acute on chronic respiratory failure        HPI:  Mr. Fareed Richard Jr. is a 72 y.o. 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  History is obtained from wife at bedside.  She reports that at baseline, he is on 5L trach collar.  However, for the last 1-2 weeks he has been having worsening respiratory issues.  He is currently on a prolonged Prednisone taper.  He went to the [facility] ER on 9/14 for hemoptysis and bloody trach secretions.  Hemoglobin was 8.4.  At that encounter, he was evaluated by ENT who felt like his airway was patent.  His hemoptysis stopped before leaving the ER.  He was discharged with Augmentin for aspiration pneumonia seen on CT scan.  However, on the day of admission he complained of chest pain, shortness of breath, and worsening fatigue.  Wife also endorsed yellow/green and foul colored trach secretions, but no fever or chills.  She suctioned him, which did not help - so she had to increase his home oxygen from 5L to 10L.  EMS was called and suctioned him, getting his oxygen saturations to 100% on 5L NC.  He denies any dark stools to his knowledge, but is on Aspirin and Plavix.  Chart review showed multiple respiratory cultures with Pseudomonas, unclear if colonization.    Upon arrival to the ER, vitals were temp 98.2F, HR 87, /78 satting 100% on 10L trach collar, 60% FiO2.  Labs showed Hemoglobin 5.5 and .  CXR showed pulmonary vascular and interstitial markings with effusions, no  clear consolidation.  HAYLEE was positive for melena.  He was given 1L NS, Vanc, Zosyn.  2 units PRBCs were ordered and he was given IV PPI.  He was admitted to Hospital Medicine for further management.      Overview/Hospital Course:  Mr. Richard was admitted to Hospital Medicine for management of acute on chronic respiratory failure 2/2 tracheitis/pneumonia, as well as a GIB.  He was started transfused 2 units PRBCs, started on IV PPI and GI was consulted.  He was started on Zosyn and Tobramycin nebs for pneumonia/tracheitis.  Resp cx returned with pan sensitive Pseudomonas.  ID was consulted.  He was changed from Zosyn to Levaquin given the high AMRITA.  GI attempted EGD/cscope 9/27, but the prep was poor for his cscope, and he desatted requiring 8L with sedation, so neither procedure was performed.  Repeat CT chest was ordered, as well as intense RT intervention to optimize respiratory status so that GI can perform the procedures at the end of the week when respiratory status has been optimized and is more stable.  CT chest showed multifocal pneumonia.      Interval History: No acute events overnight.  Feels much better with RT.  Breathing better, secretions improving.  BP up to 150s so will restart Metoprolol.    Review of Systems   Constitutional:  Negative for chills, fatigue and fever.   Respiratory:  Negative for cough and shortness of breath.    Cardiovascular:  Negative for chest pain, palpitations and leg swelling.   Gastrointestinal:  Negative for abdominal pain, diarrhea, nausea and vomiting.   Genitourinary:  Negative for dysuria and urgency.   Neurological:  Negative for dizziness and headaches.   All other systems reviewed and are negative.  Objective:     Vital Signs (Most Recent):  Temp: 97.9 °F (36.6 °C) (09/28/22 0857)  Pulse: 97 (09/28/22 0857)  Resp: 20 (09/28/22 0857)  BP: 133/86 (09/28/22 0859)  SpO2: 95 % (09/28/22 0857)   Vital Signs (24h Range):  Temp:  [97.6 °F (36.4 °C)-98 °F (36.7 °C)] 97.9 °F  (36.6 °C)  Pulse:  [] 97  Resp:  [14-31] 20  SpO2:  [93 %-100 %] 95 %  BP: ()/(57-86) 133/86     Weight: 59.9 kg (132 lb)  Body mass index is 23.38 kg/m².    Intake/Output Summary (Last 24 hours) at 9/28/2022 1030  Last data filed at 9/28/2022 0640  Gross per 24 hour   Intake --   Output 900 ml   Net -900 ml        Physical Exam  Constitutional:       Appearance: Normal appearance. He is well-developed.   HENT:      Head: Normocephalic and atraumatic.   Neck:      Comments: Trach  Cardiovascular:      Rate and Rhythm: Normal rate and regular rhythm.      Heart sounds: No murmur heard.  Pulmonary:      Effort: Pulmonary effort is normal. No respiratory distress.      Breath sounds: Normal breath sounds. No wheezing or rales.   Abdominal:      General: There is no distension.      Palpations: Abdomen is soft.      Tenderness: There is no abdominal tenderness.      Comments: Peg   Musculoskeletal:         General: No deformity.   Skin:     General: Skin is warm.      Coloration: Skin is not pale.   Neurological:      General: No focal deficit present.      Mental Status: He is alert and oriented to person, place, and time. Mental status is at baseline.       Significant Labs: All pertinent labs within the past 24 hours have been reviewed.  CBC:   Recent Labs   Lab 09/27/22  0533 09/28/22  0658   WBC 7.42 7.32   HGB 9.4* 8.5*   HCT 30.9* 28.3*    281       Respiratory Culture:   No results for input(s): GSRESP, RESPIRATORYC in the last 48 hours.      Significant Imaging: I have reviewed all pertinent imaging results/findings within the past 24 hours.      Assessment/Plan:      * Acute on chronic respiratory failure  · On 5L trach collar at home, but requiring 10L trach collar on admit, down to home 5L  · Likely 2/2 tracheitis and pneumonia  · Continue Prednisone taper from outpatient - 20mg daily x 7 days, then 10mg x  7 days  · Repeat CT chest showed multifocal pneumonia  · Aggressive  RT  · Management as below    Pneumonia  · As above      Acute tracheitis  · Recently with hemoptysis and now yellow/green and foul smelling secretions from his trach and rhonchi - recent CT chest 9/14 with pneumonia  · Trach culture with Pseudomonas - Previous resp cx with Pseudomonas, unclear if colonization  · Started on Zosyn 9/23, changed to Levaquin by ID 9/24 because of high AMRITA.  Plan for 7 days (end date 10/1)  · Continue Tobra nebs  · Can consider ENT eval      Gastrointestinal hemorrhage with melena  · GIB Pathway initiated  · Hgb 5.5 on admit, baseline around 8 as of 2 weeks ago - now stable s/p 2 units PRBCs  · Continue Protonix 40mg IV BID  · GI consulted, appreciate assistance  · EGD/cscope 9/27 aborted because of desats and poor prep  · Plan for repeat procedures end of the week when respiratory status is more optimized  · Transfuse for Hemoglobin <7        Tracheostomy present  · On 5L trach collar at home      Acute blood loss anemia  · See Melena      Elevated brain natriuretic peptide (BNP) level  · BNP newly elevated at 468  · 2D echo with EF 55% and G1DD  · Lasix x 1 to help breathing      PEG (percutaneous endoscopic gastrostomy) status  · See Dysphagia      Dysphagia  · S/p peg  · On TF - Dietary following      Other hyperlipidemia  · Chronic and stable  · Continue Statin      Primary hypertension  · Chronic issue  · BP now up to 150s  · Restart Metoprolol 200mg daily      Coronary artery disease involving native coronary artery of native heart without angina pectoris  · Chronic and stable  · Hold Aspirin and Plavix with GIB  · Continue Statin  · Restart Metoprolol 200mg daily given BP 150s      Squamous cell cancer of tongue  · S/p chemo, radiation  · Now with flap, trach, and peg    Peripheral vascular disease  · Chronic issue  · Hold Aspirin and Plavix with GIB  · Continue Statin        VTE Risk Mitigation (From admission, onward)         Ordered     IP VTE HIGH RISK PATIENT  Once          09/23/22 1409     Place sequential compression device  Until discontinued         09/23/22 1408                Discharge Planning   NISA: 9/30/2022     Code Status: Full Code   Is the patient medically ready for discharge?: No    Reason for patient still in hospital (select all that apply): Patient trending condition  Discharge Plan A: Home with family                  Brenda Saldivar MD  Department of Hospital Medicine   [facility] - Telemetry Stepdown

## 2022-09-28 NOTE — PLAN OF CARE
Problem: Adjustment to Illness (Gastrointestinal Bleeding)  Goal: Optimal Coping with Acute Illness  Outcome: Ongoing, Progressing     Problem: Bleeding (Gastrointestinal Bleeding)  Goal: Hemostasis  Outcome: Ongoing, Progressing     Problem: Adult Inpatient Plan of Care  Goal: Plan of Care Review  Outcome: Ongoing, Progressing  Goal: Patient-Specific Goal (Individualized)  Outcome: Ongoing, Progressing  Goal: Absence of Hospital-Acquired Illness or Injury  Outcome: Ongoing, Progressing  Goal: Optimal Comfort and Wellbeing  Outcome: Ongoing, Progressing  Goal: Readiness for Transition of Care  Outcome: Ongoing, Progressing     Problem: Fluid Imbalance (Pneumonia)  Goal: Fluid Balance  Outcome: Ongoing, Progressing     Problem: Infection (Pneumonia)  Goal: Resolution of Infection Signs and Symptoms  Outcome: Ongoing, Progressing     Problem: Respiratory Compromise (Pneumonia)  Goal: Effective Oxygenation and Ventilation  Outcome: Ongoing, Progressing     Problem: Impaired Wound Healing  Goal: Optimal Wound Healing  Outcome: Ongoing, Progressing     Problem: Skin Injury Risk Increased  Goal: Skin Health and Integrity  Outcome: Ongoing, Progressing

## 2022-09-29 ENCOUNTER — ANESTHESIA EVENT (OUTPATIENT)
Dept: ENDOSCOPY | Facility: HOSPITAL | Age: 72
DRG: 205 | End: 2022-09-29
Payer: MEDICARE

## 2022-09-29 PROCEDURE — 25000003 PHARM REV CODE 250: Performed by: STUDENT IN AN ORGANIZED HEALTH CARE EDUCATION/TRAINING PROGRAM

## 2022-09-29 PROCEDURE — 25000003 PHARM REV CODE 250: Performed by: HOSPITALIST

## 2022-09-29 PROCEDURE — 94640 AIRWAY INHALATION TREATMENT: CPT

## 2022-09-29 PROCEDURE — 94668 MNPJ CHEST WALL SBSQ: CPT

## 2022-09-29 PROCEDURE — 99233 PR SUBSEQUENT HOSPITAL CARE,LEVL III: ICD-10-PCS | Mod: ,,, | Performed by: HOSPITALIST

## 2022-09-29 PROCEDURE — 99233 SBSQ HOSP IP/OBS HIGH 50: CPT | Mod: ,,, | Performed by: HOSPITALIST

## 2022-09-29 PROCEDURE — C9113 INJ PANTOPRAZOLE SODIUM, VIA: HCPCS | Performed by: HOSPITALIST

## 2022-09-29 PROCEDURE — 27000221 HC OXYGEN, UP TO 24 HOURS

## 2022-09-29 PROCEDURE — 63600175 PHARM REV CODE 636 W HCPCS: Performed by: HOSPITALIST

## 2022-09-29 PROCEDURE — 25000242 PHARM REV CODE 250 ALT 637 W/ HCPCS: Performed by: PHYSICIAN ASSISTANT

## 2022-09-29 PROCEDURE — 99900026 HC AIRWAY MAINTENANCE (STAT)

## 2022-09-29 PROCEDURE — 99900035 HC TECH TIME PER 15 MIN (STAT)

## 2022-09-29 PROCEDURE — 20600001 HC STEP DOWN PRIVATE ROOM

## 2022-09-29 PROCEDURE — 94761 N-INVAS EAR/PLS OXIMETRY MLT: CPT

## 2022-09-29 PROCEDURE — 25000242 PHARM REV CODE 250 ALT 637 W/ HCPCS: Performed by: HOSPITALIST

## 2022-09-29 RX ORDER — FUROSEMIDE 10 MG/ML
40 INJECTION INTRAMUSCULAR; INTRAVENOUS ONCE
Status: COMPLETED | OUTPATIENT
Start: 2022-09-29 | End: 2022-09-29

## 2022-09-29 RX ORDER — PREDNISONE 5 MG/ML
10 SOLUTION ORAL DAILY
Status: DISCONTINUED | OUTPATIENT
Start: 2022-09-30 | End: 2022-09-30 | Stop reason: HOSPADM

## 2022-09-29 RX ORDER — POLYETHYLENE GLYCOL 3350, SODIUM SULFATE ANHYDROUS, SODIUM BICARBONATE, SODIUM CHLORIDE, POTASSIUM CHLORIDE 236; 22.74; 6.74; 5.86; 2.97 G/4L; G/4L; G/4L; G/4L; G/4L
4000 POWDER, FOR SOLUTION ORAL ONCE
Status: COMPLETED | OUTPATIENT
Start: 2022-09-29 | End: 2022-09-29

## 2022-09-29 RX ADMIN — PREDNISONE 20 MG: 5 SOLUTION ORAL at 09:09

## 2022-09-29 RX ADMIN — PANTOPRAZOLE SODIUM 40 MG: 40 INJECTION, POWDER, FOR SOLUTION INTRAVENOUS at 09:09

## 2022-09-29 RX ADMIN — METOPROLOL SUCCINATE 200 MG: 100 TABLET, EXTENDED RELEASE ORAL at 09:09

## 2022-09-29 RX ADMIN — TOBRAMYCIN 300 MG: 300 SOLUTION RESPIRATORY (INHALATION) at 09:09

## 2022-09-29 RX ADMIN — LEVOFLOXACIN 750 MG: 750 TABLET, FILM COATED ORAL at 09:09

## 2022-09-29 RX ADMIN — Medication 6 MG: at 09:09

## 2022-09-29 RX ADMIN — IPRATROPIUM BROMIDE AND ALBUTEROL SULFATE 3 ML: 2.5; .5 SOLUTION RESPIRATORY (INHALATION) at 06:09

## 2022-09-29 RX ADMIN — POLYETHYLENE GLYCOL 3350, SODIUM SULFATE ANHYDROUS, SODIUM BICARBONATE, SODIUM CHLORIDE, POTASSIUM CHLORIDE 4000 ML: 236; 22.74; 6.74; 5.86; 2.97 POWDER, FOR SOLUTION ORAL at 09:09

## 2022-09-29 RX ADMIN — ATORVASTATIN CALCIUM 20 MG: 20 TABLET, FILM COATED ORAL at 09:09

## 2022-09-29 RX ADMIN — FUROSEMIDE 40 MG: 10 INJECTION, SOLUTION INTRAMUSCULAR; INTRAVENOUS at 09:09

## 2022-09-29 NOTE — PLAN OF CARE
Problem: Adjustment to Illness (Gastrointestinal Bleeding)  Goal: Optimal Coping with Acute Illness  Outcome: Ongoing, Progressing     Problem: Bleeding (Gastrointestinal Bleeding)  Goal: Hemostasis  Outcome: Ongoing, Progressing     Problem: Adult Inpatient Plan of Care  Goal: Plan of Care Review  Outcome: Ongoing, Progressing  Goal: Patient-Specific Goal (Individualized)  Outcome: Ongoing, Progressing  Goal: Absence of Hospital-Acquired Illness or Injury  Outcome: Ongoing, Progressing  Goal: Optimal Comfort and Wellbeing  Outcome: Ongoing, Progressing  Goal: Readiness for Transition of Care  Outcome: Ongoing, Progressing     Problem: Infection (Pneumonia)  Goal: Resolution of Infection Signs and Symptoms  Outcome: Ongoing, Progressing     Problem: Fluid Imbalance (Pneumonia)  Goal: Fluid Balance  Outcome: Ongoing, Progressing     Problem: Impaired Wound Healing  Goal: Optimal Wound Healing  Outcome: Ongoing, Progressing     Problem: Respiratory Compromise (Pneumonia)  Goal: Effective Oxygenation and Ventilation  Outcome: Ongoing, Progressing     Problem: Skin Injury Risk Increased  Goal: Skin Health and Integrity  Outcome: Ongoing, Progressing     Problem: Impaired Wound Healing  Goal: Optimal Wound Healing  Outcome: Ongoing, Progressing     Problem: Respiratory Compromise (Pneumonia)  Goal: Effective Oxygenation and Ventilation  Outcome: Ongoing, Progressing     Problem: Infection (Pneumonia)  Goal: Resolution of Infection Signs and Symptoms  Outcome: Ongoing, Progressing

## 2022-09-29 NOTE — ASSESSMENT & PLAN NOTE
· GIB Pathway initiated  · Hgb 5.5 on admit, baseline around 8 as of 2 weeks ago - now stable s/p 2 units PRBCs  · Continue Protonix 40mg IV BID  · GI consulted, appreciate assistance  · EGD/cscope 9/27 aborted because of desats and poor prep  · Plan for repeat procedures tomorrow  · Transfuse for Hemoglobin <7

## 2022-09-29 NOTE — SUBJECTIVE & OBJECTIVE
Interval History: No acute events overnight.  Feels well.  Plan for EGD/csope tomorrow.  Weaned to Prednisone 10mg today.  Will give Lasix IV to improve respiratory status     Review of Systems   Constitutional:  Negative for chills, fatigue and fever.   Respiratory:  Negative for cough and shortness of breath.    Cardiovascular:  Negative for chest pain, palpitations and leg swelling.   Gastrointestinal:  Negative for abdominal pain, diarrhea, nausea and vomiting.   Genitourinary:  Negative for dysuria and urgency.   Neurological:  Negative for dizziness and headaches.   All other systems reviewed and are negative.  Objective:     Vital Signs (Most Recent):  Temp: 98.8 °F (37.1 °C) (09/29/22 1126)  Pulse: 82 (09/29/22 1126)  Resp: 18 (09/29/22 1126)  BP: (!) 108/57 (09/29/22 1126)  SpO2: 96 % (09/29/22 1126)   Vital Signs (24h Range):  Temp:  [97.4 °F (36.3 °C)-98.8 °F (37.1 °C)] 98.8 °F (37.1 °C)  Pulse:  [67-94] 82  Resp:  [16-18] 18  SpO2:  [91 %-100 %] 96 %  BP: (103-143)/(57-70) 108/57     Weight: 59.9 kg (132 lb)  Body mass index is 23.38 kg/m².    Intake/Output Summary (Last 24 hours) at 9/29/2022 1156  Last data filed at 9/29/2022 1130  Gross per 24 hour   Intake 365 ml   Output 675 ml   Net -310 ml        Physical Exam  Constitutional:       Appearance: Normal appearance. He is well-developed.   HENT:      Head: Normocephalic and atraumatic.   Neck:      Comments: Trach  Cardiovascular:      Rate and Rhythm: Normal rate and regular rhythm.      Heart sounds: No murmur heard.  Pulmonary:      Effort: Pulmonary effort is normal. No respiratory distress.      Breath sounds: Normal breath sounds. No wheezing or rales.   Abdominal:      General: There is no distension.      Palpations: Abdomen is soft.      Tenderness: There is no abdominal tenderness.      Comments: Peg   Musculoskeletal:         General: No deformity.   Skin:     General: Skin is warm.      Coloration: Skin is not pale.   Neurological:       General: No focal deficit present.      Mental Status: He is alert and oriented to person, place, and time. Mental status is at baseline.       Significant Labs: All pertinent labs within the past 24 hours have been reviewed.  CBC:   Recent Labs   Lab 09/28/22  0658   WBC 7.32   HGB 8.5*   HCT 28.3*          Respiratory Culture:   No results for input(s): GSRESP, RESPIRATORYC in the last 48 hours.      Significant Imaging: I have reviewed all pertinent imaging results/findings within the past 24 hours.

## 2022-09-29 NOTE — PROGRESS NOTES
[facility] - Telemetry [facility] Medicine  Progress Note    Patient Name: Fareed Richard Jr.  MRN: [id number]  Patient Class: IP- Inpatient   Admission Date: 9/23/2022  Length of Stay: 6 days  Attending Physician: Brenda Saldivar MD  Primary Care Provider: Kodi Tubbs MD        Subjective:     Principal Problem:Acute on chronic respiratory failure        HPI:  Mr. Fareed Richard Jr. is a 72 y.o. 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  History is obtained from wife at bedside.  She reports that at baseline, he is on 5L trach collar.  However, for the last 1-2 weeks he has been having worsening respiratory issues.  He is currently on a prolonged Prednisone taper.  [place] [facility] [place] hemoptysis and bloody trach secretions.  Hemoglobin was 8.4.  At that encounter, he was evaluated by ENT who felt like his airway was patent.  His hemoptysis stopped before leaving the ER.  He was discharged with Augmentin for aspiration pneumonia seen on CT scan.  However, on the day of admission he complained of chest pain, shortness of breath, and worsening fatigue.  Wife also endorsed yellow/green and foul colored trach secretions, but no fever or chills.  She suctioned him, which did not help - so she had to increase his home oxygen from 5L to 10L.  EMS was called and suctioned him, getting his oxygen saturations to 100% on 5L NC.  He denies any dark stools to his knowledge, but is on Aspirin and Plavix.  Chart review showed multiple respiratory cultures with Pseudomonas, unclear if colonization.    Upon arrival to the ER, vitals were temp 98.2F, HR 87, /78 satting 100% on 10L trach collar, 60% FiO2.  Labs showed Hemoglobin 5.5 and .  CXR showed pulmonary vascular and interstitial markings with effusions, no  clear consolidation.  HAYLEE was positive for melena.  He was given 1L NS, Vanc, Zosyn.  2 units PRBCs were ordered and he was given IV PPI.  He was admitted to Hospital Medicine for further management.      Overview/Hospital Course:  Mr. Richard was admitted to Hospital Medicine for management of acute on chronic respiratory failure 2/2 tracheitis/pneumonia, as well as a GIB.  He was started transfused 2 units PRBCs, started on IV PPI and GI was consulted.  He was started on Zosyn and Tobramycin nebs for pneumonia/tracheitis.  Resp cx returned with pan sensitive Pseudomonas.  ID was consulted.  He was changed from Zosyn to Levaquin given the high AMRITA.  GI attempted EGD/cscope 9/27, but the prep was poor for his cscope, and he desatted requiring 8L with sedation, so neither procedure was performed.  Repeat CT chest was ordered, as well as intense RT intervention to optimize respiratory status so that GI can perform the procedures at the end of the week when respiratory status has been optimized and is more stable.  CT chest showed multifocal pneumonia.      Interval History: No acute events overnight.  Feels well.  Plan for EGD/csope tomorrow.  Weaned to Prednisone 10mg today.  Will give Lasix IV to improve respiratory status     Review of Systems   Constitutional:  Negative for chills, fatigue and fever.   Respiratory:  Negative for cough and shortness of breath.    Cardiovascular:  Negative for chest pain, palpitations and leg swelling.   Gastrointestinal:  Negative for abdominal pain, diarrhea, nausea and vomiting.   Genitourinary:  Negative for dysuria and urgency.   Neurological:  Negative for dizziness and headaches.   All other systems reviewed and are negative.  Objective:     Vital Signs (Most Recent):  Temp: 98.8 °F (37.1 °C) (09/29/22 1126)  Pulse: 82 (09/29/22 1126)  Resp: 18 (09/29/22 1126)  BP: (!) 108/57 (09/29/22 1126)  SpO2: 96 % (09/29/22 1126)   Vital Signs (24h Range):  Temp:  [97.4 °F (36.3 °C)-98.8  °F (37.1 °C)] 98.8 °F (37.1 °C)  Pulse:  [67-94] 82  Resp:  [16-18] 18  SpO2:  [91 %-100 %] 96 %  BP: (103-143)/(57-70) 108/57     Weight: 59.9 kg (132 lb)  Body mass index is 23.38 kg/m².    Intake/Output Summary (Last 24 hours) at 9/29/2022 1156  Last data filed at 9/29/2022 1130  Gross per 24 hour   Intake 365 ml   Output 675 ml   Net -310 ml        Physical Exam  Constitutional:       Appearance: Normal appearance. He is well-developed.   HENT:      Head: Normocephalic and atraumatic.   Neck:      Comments: Trach  Cardiovascular:      Rate and Rhythm: Normal rate and regular rhythm.      Heart sounds: No murmur heard.  Pulmonary:      Effort: Pulmonary effort is normal. No respiratory distress.      Breath sounds: Normal breath sounds. No wheezing or rales.   Abdominal:      General: There is no distension.      Palpations: Abdomen is soft.      Tenderness: There is no abdominal tenderness.      Comments: Peg   Musculoskeletal:         General: No deformity.   Skin:     General: Skin is warm.      Coloration: Skin is not pale.   Neurological:      General: No focal deficit present.      Mental Status: He is alert and oriented to person, place, and time. Mental status is at baseline.       Significant Labs: All pertinent labs within the past 24 hours have been reviewed.  CBC:   Recent Labs   Lab 09/28/22  0658   WBC 7.32   HGB 8.5*   HCT 28.3*          Respiratory Culture:   No results for input(s): GSRESP, RESPIRATORYC in the last 48 hours.      Significant Imaging: I have reviewed all pertinent imaging results/findings within the past 24 hours.      Assessment/Plan:      * Acute on chronic respiratory failure  · On 5L trach collar at home, but requiring 10L trach collar on admit, down to home 5L  · Likely 2/2 tracheitis and pneumonia  · Continue Prednisone taper from outpatient - 10mg x  7 days  · Repeat CT chest showed multifocal pneumonia  · Aggressive RT  · Management as below    Pneumonia  · As  above      Acute tracheitis  · Recently with hemoptysis and now yellow/green and foul smelling secretions from his trach and rhonchi - recent CT chest 9/14 with pneumonia  · Trach culture with Pseudomonas - Previous resp cx with Pseudomonas, unclear if colonization  · Started on Zosyn 9/23, changed to Levaquin by ID 9/24 because of high AMRITA.  Plan for 7 days (end date 10/1)  · Continue Tobra nebs  · Can consider ENT eval      Gastrointestinal hemorrhage with melena  · GIB Pathway initiated  · Hgb 5.5 on admit, baseline around 8 as of 2 weeks ago - now stable s/p 2 units PRBCs  · Continue Protonix 40mg IV BID  · GI consulted, appreciate assistance  · EGD/cscope 9/27 aborted because of desats and poor prep  · Plan for repeat procedures tomorrow  · Transfuse for Hemoglobin <7        Tracheostomy present  · On 5L trach collar at home      Acute blood loss anemia  · See Melena      Elevated brain natriuretic peptide (BNP) level  · BNP newly elevated at 468  · 2D echo with EF 55% and G1DD  · Lasix x 1 to help breathing      PEG (percutaneous endoscopic gastrostomy) status  · See Dysphagia      Dysphagia  · S/p peg  · On TF - Dietary following      Other hyperlipidemia  · Chronic and stable  · Continue Statin      Primary hypertension  · Chronic issue  · BP now up to 150s  · Restart Metoprolol 200mg daily      Coronary artery disease involving native coronary artery of native heart without angina pectoris  · Chronic and stable  · Hold Aspirin and Plavix with GIB  · Continue Statin  · Restart Metoprolol 200mg daily given BP 150s      Squamous cell cancer of tongue  · S/p chemo, radiation  · Now with flap, trach, and peg    Peripheral vascular disease  · Chronic issue  · Hold Aspirin and Plavix with GIB  · Continue Statin        VTE Risk Mitigation (From admission, onward)         Ordered     IP VTE HIGH RISK PATIENT  Once         09/23/22 1409     Place sequential compression device  Until discontinued         09/23/22 1408                 Discharge Planning   NISA: 9/30/2022     Code Status: Full Code   Is the patient medically ready for discharge?: No    Reason for patient still in hospital (select all that apply): Patient trending condition  Discharge Plan A: Home with family                  Brenda Saldivar MD  Department of Hospital Medicine   [facility] - Telemetry Stepdown

## 2022-09-29 NOTE — CARE UPDATE
Pt has been on bedside commode for quite a while. Breathing and o2 levels assessed. Pt in NAD. Will continue to monitor.

## 2022-09-29 NOTE — ASSESSMENT & PLAN NOTE
· On 5L trach collar at home, but requiring 10L trach collar on admit, down to home 5L  · Likely 2/2 tracheitis and pneumonia  · Continue Prednisone taper from outpatient - 10mg x  7 days  · Repeat CT chest showed multifocal pneumonia  · Aggressive RT  · Management as below

## 2022-09-29 NOTE — RESPIRATORY THERAPY
RAPID RESPONSE RESPIRATORY THERAPY PROACTIVE NOTE           Time of visit: 913     Code Status: Full Code   : 1950  Bed: 8096/8096 A:   MRN: [id number]  Time spent at the bedside: < 15 min    SITUATION    Evaluated patient for: LDA Check     BACKGROUND    Patient has a past medical history of Cancer, COPD (chronic obstructive pulmonary disease), Hyperlipidemia, Hypertension, and Pseudoaneurysm.  Clinically Significant Surgical Hx: tracheostomy    24 Hours Vitals Range:  Temp:  [97.4 °F (36.3 °C)-98.8 °F (37.1 °C)]   Pulse:  [67-94]   Resp:  [16-18]   BP: (103-143)/(57-70)   SpO2:  [91 %-100 %]     Labs:    Recent Labs     22  0533      K 4.0   CL 98   CO2 27   CREATININE 0.8   *        No results for input(s): PH, PCO2, PO2, HCO3, POCSATURATED, BE in the last 72 hours.    ASSESSMENT/INTERVENTIONS  Pt on 5L 28% trach collar. Shiley size 6 uncuffed in place. All supplies in room.      Last VS   Temp: 98.8 °F (37.1 °C) (1126)  Pulse: 82 (1126)  Resp: 18 (1126)  BP: 108/57 (1126)  SpO2: 96 % (1126)      Extra trachs at bedside: Shiley cuffed 4 and 6  Level of Consciousness: Level of Consciousness (AVPU): alert  Respiratory Effort: Respiratory Effort: Unlabored Expansion/Accessory Muscle Usage: Expansion/Accessory Muscles/Retractions: no use of accessory muscles  All Lung Field Breath Sounds: All Lung Fields Breath Sounds: Anterior:, Lateral:, diminished  DEE Breath Sounds: wheezes, expiratory  LLL Breath Sounds: diminished  RUL Breath Sounds: diminished  RML Breath Sounds: diminished  RLL Breath Sounds: diminished  O2 Device/Concentration: 5L 28% TC  Surgical airway: Yes, Type: Shiley Size: 6, uncuffed  Ambu at bedside: Ambu bag with the patient?: Yes, Adult Ambu     Active Orders   Respiratory Care    Chest physiotherapy TID     Frequency: TID     Number of Occurrences: Until Specified     Order Questions:      Indications: COPIOUS SPUTUM PRODUCTION    Inhalation  Treatment Q4H PRN     Frequency: Q4H PRN     Number of Occurrences: Until Specified    Oxygen Continuous     Frequency: Continuous     Number of Occurrences: Until Specified     Order Questions:      Device type: Low flow      Device: Trach Collar      Titrate O2 per Oxygen Titration Protocol: Yes      To maintain SpO2 goal of: >= 90%      Notify MD of: Inability to achieve desired SpO2; Sudden change in patient status and requires 20% increase in FiO2; Patient requires >60% FiO2    Oxygen Continuous     Frequency: Continuous     Number of Occurrences: Until Specified     Order Comments: Discontinue when Sp02 is greater than or equal to 95% of equal to Preop Sp02       Order Questions:      Device type: Low flow      Device: Simple Face Mask      Titrate O2 per Oxygen Titration Protocol: Yes      Notify MD of: Inability to achieve desired SpO2    Pulse Oximetry Continuous     Frequency: Continuous     Number of Occurrences: Until Specified    RESPIRATORY COMMUNICATION     Frequency: Q4H     Number of Occurrences: Until Specified     Order Comments: Please help optimize respiratory status.  Desatted during EGD/cscope      Routine tracheostomy care     Frequency: BID     Number of Occurrences: Until Specified       RECOMMENDATIONS    We recommend: RRT Recs: Continue POC per primary team.      FOLLOW-UP    Please call back the Rapid Response RT, Rylan Looney RRT at x [phone] for any questions or concerns.

## 2022-09-29 NOTE — ANESTHESIA PREPROCEDURE EVALUATION
[facility]  Anesthesia Pre-Operative Evaluation         Patient Name/: Fareed Richard Jr., 1950  MRN: [id number]    SUBJECTIVE:     Pre-operative evaluation for Procedure(s) (LRB):  EGD (ESOPHAGOGASTRODUODENOSCOPY) (N/A)  COLONOSCOPY (N/A)     2022    Fareed Richard Jr. is a 72 y.o. 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 He initially presented to ED with worsening respiratory distress for a few weeks. To note, he went to [facility] ER on  for hemoptysis and bloody trach secretions. Attempted EGD 22, however, large amounta of stool were in the colon and oxygen sats decreased to low 80s (baseline 90 on 5L trach collar) so case abandoned. Patient with known pneumonia & hypoxic episodes preop. Now planning for another attempt at EGD.      Patient now presents for the above procedure(s).    TTE 22  Summary     The left ventricle is normal in size with concentric remodeling and normal systolic function. The estimated ejection fraction is 55%.   Normal right ventricular size with normal right ventricular systolic function.   Grade I left ventricular diastolic dysfunction.   Mild left atrial enlargement.   The aortic root is mildly dilated.   The estimated PA systolic pressure is 34 mmHg.   Normal central venous pressure (3 mmHg).        Prev airway:     Intubated:  Postinduction    Mask Ventilation:  Easy with oral airway    Attempts:  1    Attempted By:  Resident anesthesiologist    Method of Intubation:  Video laryngoscopy    Blade:  Ames 3    Laryngeal View Grade: Grade I - full view of cords      Difficult Airway Encountered?: No      Complications:  None    Airway Device:  Oral endotracheal tube    Airway Device Size:  7.5    LDA:        Peripheral IV - Single Lumen 22 20 G Distal;Posterior;Right Forearm (Active)   Site Assessment  Clean;Dry;Intact 09/29/22 0735   Extremity Assessment Distal to IV No abnormal discoloration 09/29/22 0735   Line Status Saline locked 09/29/22 0735   Dressing Status Intact 09/29/22 0735   Dressing Intervention Integrity maintained 09/29/22 0735   Dressing Change Due 09/27/22 09/25/22 0811   Reason Not Rotated Not due 09/25/22 0811   Number of days: 5            Peripheral IV - Single Lumen 09/23/22 1012 18 G Anterior;Left Forearm (Active)   Site Assessment Clean;Dry;Intact 09/29/22 0735   Extremity Assessment Distal to IV No abnormal discoloration 09/29/22 0735   Line Status Flushed 09/29/22 0735   Dressing Status Intact 09/29/22 0735   Dressing Intervention Integrity maintained 09/29/22 0735   Dressing Change Due 09/27/22 09/25/22 0811   Reason Not Rotated Not due 09/25/22 0811   Number of days: 6            Gastrostomy/Enterostomy 01/04/22 Percutaneous endoscopic gastrostomy (PEG) LUQ (Active)   Securement secured to abdomen 09/29/22 0735   Interventions Prior to Feeding patency checked 09/29/22 0735   Feeding Type continuous 09/29/22 0735   Clamp Status/Tolerance unclamped 09/29/22 0735   Feeding Action feeding continued 09/29/22 0735   Dressing dry and intact 09/29/22 0735   Insertion Site no redness;no drainage;no tenderness 09/28/22 0857   Flush/Irrigation flushed w/;water 09/29/22 0735   Current Rate (mL/hr) 55 mL/hr 09/29/22 0735   Goal Rate (mL/hr) 55 mL/hr 09/29/22 0735   Water Bolus (mL) 200 mL 09/29/22 0735   Formula Name Isosource 1.5 09/29/22 0735   Tube Feeding Intake (mL) 90 09/25/22 2010   Residual Amount (ml) 0 ml 09/25/22 2010   Number of days: 268       Drips: None documented.      Patient Active Problem List   Diagnosis    Peripheral vascular disease    Carotid stenosis    Squamous cell cancer of tongue    Coronary artery disease involving native coronary artery of native heart without angina pectoris    Primary hypertension    Other hyperlipidemia    Impaired mobility and ADLs     Tracheobronchitis    Toxic effect of tobacco cigarette, intentional self-harm    COPD exacerbation    Acute blood loss anemia    Secondary malignant neoplasm of cervical lymph node    Protein-calorie malnutrition    Chronic respiratory failure with hypoxia, on home O2 therapy    Acute on chronic respiratory failure    Dysphagia    Aspiration pneumonia    Goals of care, counseling/discussion    Pulmonary infiltrate    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     Bloody sputum    Tracheostomy present    PEG (percutaneous endoscopic gastrostomy) status    Elevated brain natriuretic peptide (BNP) level    Acute tracheitis    Gastrointestinal hemorrhage with melena    Pneumonia       Review of patient's allergies indicates:  No Known Allergies    Current Inpatient Medications:    atorvastatin  20 mg Per G Tube Daily    levoFLOXacin  750 mg Per G Tube Daily    metoprolol succinate  200 mg Oral BID    pantoprazole  40 mg Intravenous BID    [START ON 9/30/2022] prednisone  10 mg Oral Daily    prednisone  20 mg Oral Daily    tobramycin (PF)  300 mg Nebulization BID       No current facility-administered medications on file prior to encounter.     Current Outpatient Medications on File Prior to Encounter   Medication Sig Dispense Refill    aspirin 81 MG Chew 1 tablet (81 mg total) by Per G Tube route once daily.  0    atorvastatin (LIPITOR) 20 MG tablet 1 tablet (20 mg total) by Per G Tube route once daily. 90 tablet 3    bisacodyL (DULCOLAX) 10 mg Supp Place 1 suppository (10 mg total) rectally daily as needed.  0    clopidogreL (PLAVIX) 75 mg tablet 1 tablet (75 mg total) by Per G Tube route once daily. 30 tablet 11    glycopyrrolate (CUVPOSA) 1 mg/5 mL (0.2 mg/mL) Soln Take 5 mLs (1 mg total) by mouth 3 (three) times daily as needed (TO REDUCE SECRETIONS). 473 mL 1    guaiFENesin 200 mg/5 mL Liqd Take 400 mg by mouth every 4 (four) hours as needed (for secretions).  473 mL 3    hydrOXYzine HCL (ATARAX) 25 MG tablet SMARTSI.5 Tablet(s) Gastro Tube Every 8 Hours PRN      melatonin (MELATIN) 3 mg tablet 2 tablets (6 mg total) by Per G Tube route nightly.  0    metoprolol succinate (TOPROL-XL) 200 MG 24 hr tablet Take 200 mg by mouth 2 (two) times daily.      ondansetron (ZOFRAN-ODT) 8 MG TbDL Take 1 tablet (8 mg total) by mouth every 8 (eight) hours as needed (nausea). 30 tablet 1    oxyCODONE (ROXICODONE) 5 mg/5 mL Soln 5 mLs (5 mg total) by Per G Tube route every 4 (four) hours as needed (Pain). 150 mL 0    polyethylene glycol (GLYCOLAX) 17 gram PwPk 17 g by Per G Tube route 2 (two) times daily.  0    prednisone 5 mg/5 mL Soln Take 20 mLs (20 mg total) by mouth once daily for 7 days, THEN 10 mLs (10 mg total) once daily for 7 days. 1120 mL 0    sodium chloride (OCEAN) 0.65 % nasal spray 1 spray by Nasal route as needed for Congestion.  12    WIXELA INHUB 250-50 mcg/dose diskus inhaler SMARTSI Puff(s) By Mouth Every 12 Hours      [DISCONTINUED] losartan (COZAAR) 50 MG tablet 1 tablet (50 mg total) by Per G Tube route once daily. 90 tablet 3    [DISCONTINUED] metoprolol tartrate (LOPRESSOR) 100 MG tablet 1 tablet (100 mg total) by Per G Tube route 2 (two) times daily. 60 tablet 11       Past Surgical History:   Procedure Laterality Date    ABDOMINAL SURGERY      stents placed in liver and large intestines, per patient    CAROTID STENT      COLONOSCOPY N/A 2022    Procedure: COLONOSCOPY;  Surgeon: Donnie Peterson MD;  [facility]  Service: Endoscopy;  Laterality: N/A;    CORONARY STENT PLACEMENT  2000    DISSECTION OF NECK Bilateral 2022    Procedure: DISSECTION, NECK;  Surgeon: Naeem Smalls MD;  Location: [address];  Service: ENT;  Laterality: Bilateral;    ESOPHAGOGASTRODUODENOSCOPY N/A 2022    Procedure: EGD (ESOPHAGOGASTRODUODENOSCOPY);  Surgeon: Donnie Peterson MD;  Location: [facility] ([address] [place] [address]);  Service:  Endoscopy;  Laterality: N/A;    ESOPHAGOGASTRODUODENOSCOPY W/ PEG N/A 1/4/2022    Procedure: EGD, WITH PEG TUBE INSERTION;  Surgeon: Anthony Calabrese MD;  Location: [facility] OR 2ND FLR;  Service: General;  Laterality: N/A;    EYE SURGERY      Cataract bilateral    femoral stents      bilateral    FLAP PROCEDURE N/A 1/3/2022    Procedure: CREATION, FREE FLAP;  Surgeon: Naeem Smalls MD;  Location: [facility] OR 2ND FLR;  Service: ENT;  Laterality: N/A;    FLAP PROCEDURE Right 1/4/2022    Procedure: CREATION, FREE FLAP;  Surgeon: Stacey Conde MD;  Location: [facility] OR 2ND FLR;  Service: ENT;  Laterality: Right;  Ischemic start 1203  Ischemic stop 1353    GLOSSECTOMY N/A 1/4/2022    Procedure: GLOSSECTOMY;  Surgeon: Naeem Smalls MD;  Location: [facility] OR [facility] FLR;  Service: ENT;  Laterality: N/A;    RADICAL NECK DISSECTION Left 1/3/2022    Procedure: DISSECTION, NECK, RADICAL;  Surgeon: Naeem Smalls MD;  [facility];  Service: ENT;  Laterality: Left;    SKIN CANCER EXCISION      TRACHEOTOMY N/A 1/4/2022    Procedure: TRACHEOTOMY;  Surgeon: Naeem Smalls MD;  [facility];  Service: ENT;  Laterality: N/A;       Social History:  Tobacco Use: Medium Risk    Smoking Tobacco Use: Former    Smokeless Tobacco Use: Never    Passive Exposure: Not on file       Alcohol Use: Not on file       OBJECTIVE:     Vital Signs Range:  BMI Readings from Last 1 Encounters:   09/24/22 23.38 kg/m²       Temp:  [36.4 °C (97.5 °F)-37.1 °C (98.8 °F)]   Pulse:  [68-94]   Resp:  [16-18]   BP: (108-143)/(57-63)   SpO2:  [93 %-100 %]        Significant Labs:        Component Value Date/Time    WBC 7.32 09/28/2022 0658    HGB 8.5 (L) 09/28/2022 0658    HCT 28.3 (L) 09/28/2022 0658    HCT 26 (L) 04/19/2022 0927     09/28/2022 0658     09/27/2022 0533    K 4.0 09/27/2022 0533    CL 98 09/27/2022 0533    CO2 27 09/27/2022 0533     (H) 09/27/2022 0533    BUN 18 09/27/2022 0533     CREATININE 0.8 09/27/2022 0533    MG 2.2 09/23/2022 1247    PHOS 3.7 05/12/2022 0343    CALCIUM 9.1 09/27/2022 0533    ALBUMIN 2.5 (L) 09/23/2022 1247    PROT 6.3 09/23/2022 1247    ALKPHOS 64 09/23/2022 1247    BILITOT 0.1 09/23/2022 1247    AST 13 09/23/2022 1247    ALT 13 09/23/2022 1247    INR 1.0 09/14/2022 0641    HGBA1C 6.3 (H) 09/27/2022 0533        Please see Results Review for additional labs.     Diagnostic Studies: No relevant studies.    EKG:   Results for orders placed or performed during the hospital encounter of 09/14/22   EKG 12-lead    Collection Time: 09/14/22  5:17 AM    Narrative    Test Reason : R07.9,    Vent. Rate : 072 BPM     Atrial Rate : 072 BPM     P-R Int : 170 ms          QRS Dur : 092 ms      QT Int : 386 ms       P-R-T Axes : 055 -40 058 degrees     QTc Int : 422 ms    Normal sinus rhythm  Left axis deviation  Incomplete right bundle branch block  Possible Anterior infarct ,age undetermined  Abnormal ECG  When compared with ECG of 26-AUG-2022 13:49,  No significant change was found  Confirmed by Tacos Benitez MD ([id number]) on 9/14/2022 6:00:07 PM    Referred By: [email or web address]   SELF           Confirmed By:Tacos Benitez MD       ECHO:  Results for orders placed during the hospital encounter of 09/23/22    Echo    Interpretation Summary  · The left ventricle is normal in size with concentric remodeling and normal systolic function. The estimated ejection fraction is 55%.  · Normal right ventricular size with normal right ventricular systolic function.  · Grade I left ventricular diastolic dysfunction.  · Mild left atrial enlargement.  · The aortic root is mildly dilated.  · The estimated PA systolic pressure is 34 mmHg.  · Normal central venous pressure (3 mmHg).        ASSESSMENT/PLAN:       Pre-op Assessment    I have reviewed the Patient Summary Reports.     I have reviewed the Nursing Notes. I have reviewed the NPO Status.   I have reviewed the Medications.     Review of Systems  Anesthesia  Hx:  History of prior surgery of interest to airway management or planning: Previous anesthesia: General BILATERAL FEMORAL STENTS, LEFT ARE PSUEDOANEURYSM RUPTURE REPAIR ~2017 with general anesthesia.  Denies Family Hx of Anesthesia complications.   Denies Personal Hx of Anesthesia complications.   Social:  Former Smoker, Alcohol Use 2-3 BEERS/DAY   Hematology/Oncology:  Hematology Normal      Current/Recent Cancer. --  Cancer in past history:  surgery  Oncology Comments: S/P SURGICAL REMOVAL OF CHEEK TUMOR WITH FLAP FROM NECK. S/P SURGICAL REMOVAL OF RIGHT FOREARM SKIN CANCER WITH SKIN GRAFT FROM LEFT THIGH. S/P LIP TUMOR REMOVAL.     EENT/Dental:   MASS OF TONGUE. EDENTULOUS.   Cardiovascular:   Hypertension CAD    Denies Angina. hyperlipidemia S/P SURGICAL REPAIR OF PSEUDOANEURYSM OF LEFT ARM 2017. Functional Capacity 3 METS  Coronary Artery Disease: S/P Percutaneous Coronary Intervention (PCI) coronary stent, unknown type, currently on aspirin, currently on clopidogrel (Plavix).  Peripheral Arterial Disease MESENTERIC ARTERY STENOSIS. S/P BILATERAL FEMORAL STENTS.  Carotoid Artery Disease (S/P CAROTID STENT), s/p percutaneous intervention    Pulmonary:   COPD Denies Shortness of breath.  Denies Recent URI.    Renal/:  Renal/ Normal     Hepatic/GI:  Hepatic/GI Normal    Musculoskeletal:  Musculoskeletal Normal    Neurological:  Neurology Normal    Endocrine:  Endocrine Normal    Psych:  Psychiatric Normal           Physical Exam  General: Cooperative, Alert and Oriented    Airway:  Mallampati: unable to assess   Mouth Opening: Normal  TM Distance: Normal  Tongue: Normal  Neck ROM: Normal ROM  Pre-Existing Airway: Tracheostomy tube    Dental:  Edentulous        Anesthesia Plan  Type of Anesthesia, risks & benefits discussed:    Anesthesia Type: Gen ETT  Intra-op Monitoring Plan: Standard ASA Monitors  Post Op Pain Control Plan: multimodal analgesia and IV/PO Opioids PRN  Induction:  IV  Airway Plan: Via  Tracheostomy  ASA Score: 3  Day of Surgery Review of History & Physical: H&P Update referred to the surgeon/provider.    Ready For Surgery From Anesthesia Perspective.     .

## 2022-09-30 ENCOUNTER — ANESTHESIA (OUTPATIENT)
Dept: ENDOSCOPY | Facility: HOSPITAL | Age: 72
DRG: 205 | End: 2022-09-30
Payer: MEDICARE

## 2022-09-30 ENCOUNTER — TELEPHONE (OUTPATIENT)
Dept: ENDOSCOPY | Facility: HOSPITAL | Age: 72
End: 2022-09-30
Payer: MEDICARE

## 2022-09-30 VITALS
BODY MASS INDEX: 23.39 KG/M2 | SYSTOLIC BLOOD PRESSURE: 136 MMHG | WEIGHT: 132 LBS | DIASTOLIC BLOOD PRESSURE: 71 MMHG | TEMPERATURE: 97 F | RESPIRATION RATE: 22 BRPM | HEIGHT: 63 IN | OXYGEN SATURATION: 89 % | HEART RATE: 83 BPM

## 2022-09-30 LAB
ALBUMIN SERPL BCP-MCNC: 2.6 G/DL (ref 3.5–5.2)
ALP SERPL-CCNC: 72 U/L (ref 55–135)
ALT SERPL W/O P-5'-P-CCNC: 16 U/L (ref 10–44)
ANION GAP SERPL CALC-SCNC: 11 MMOL/L (ref 8–16)
AST SERPL-CCNC: 16 U/L (ref 10–40)
BASOPHILS # BLD AUTO: 0.01 K/UL (ref 0–0.2)
BASOPHILS NFR BLD: 0.2 % (ref 0–1.9)
BILIRUB SERPL-MCNC: 0.3 MG/DL (ref 0.1–1)
BUN SERPL-MCNC: 23 MG/DL (ref 8–23)
CALCIUM SERPL-MCNC: 8.8 MG/DL (ref 8.7–10.5)
CHLORIDE SERPL-SCNC: 97 MMOL/L (ref 95–110)
CO2 SERPL-SCNC: 34 MMOL/L (ref 23–29)
CREAT SERPL-MCNC: 0.9 MG/DL (ref 0.5–1.4)
DIFFERENTIAL METHOD: ABNORMAL
EOSINOPHIL # BLD AUTO: 0.2 K/UL (ref 0–0.5)
EOSINOPHIL NFR BLD: 4.7 % (ref 0–8)
ERYTHROCYTE [DISTWIDTH] IN BLOOD BY AUTOMATED COUNT: 16.1 % (ref 11.5–14.5)
EST. GFR  (NO RACE VARIABLE): >60 ML/MIN/1.73 M^2
GLUCOSE SERPL-MCNC: 104 MG/DL (ref 70–110)
HCT VFR BLD AUTO: 28 % (ref 40–54)
HGB BLD-MCNC: 8.4 G/DL (ref 14–18)
IMM GRANULOCYTES # BLD AUTO: 0.02 K/UL (ref 0–0.04)
IMM GRANULOCYTES NFR BLD AUTO: 0.4 % (ref 0–0.5)
LYMPHOCYTES # BLD AUTO: 0.5 K/UL (ref 1–4.8)
LYMPHOCYTES NFR BLD: 8.7 % (ref 18–48)
MAGNESIUM SERPL-MCNC: 1.7 MG/DL (ref 1.6–2.6)
MCH RBC QN AUTO: 25.2 PG (ref 27–31)
MCHC RBC AUTO-ENTMCNC: 30 G/DL (ref 32–36)
MCV RBC AUTO: 84 FL (ref 82–98)
MONOCYTES # BLD AUTO: 0.6 K/UL (ref 0.3–1)
MONOCYTES NFR BLD: 10.9 % (ref 4–15)
NEUTROPHILS # BLD AUTO: 3.9 K/UL (ref 1.8–7.7)
NEUTROPHILS NFR BLD: 75.1 % (ref 38–73)
NRBC BLD-RTO: 0 /100 WBC
PHOSPHATE SERPL-MCNC: 2.4 MG/DL (ref 2.7–4.5)
PLATELET # BLD AUTO: 288 K/UL (ref 150–450)
PMV BLD AUTO: 8.7 FL (ref 9.2–12.9)
POTASSIUM SERPL-SCNC: 3.6 MMOL/L (ref 3.5–5.1)
PROT SERPL-MCNC: 6.2 G/DL (ref 6–8.4)
RBC # BLD AUTO: 3.33 M/UL (ref 4.6–6.2)
SODIUM SERPL-SCNC: 142 MMOL/L (ref 136–145)
WBC # BLD AUTO: 5.15 K/UL (ref 3.9–12.7)

## 2022-09-30 PROCEDURE — 94668 MNPJ CHEST WALL SBSQ: CPT

## 2022-09-30 PROCEDURE — 45378 DIAGNOSTIC COLONOSCOPY: CPT | Mod: 74 | Performed by: STUDENT IN AN ORGANIZED HEALTH CARE EDUCATION/TRAINING PROGRAM

## 2022-09-30 PROCEDURE — 94761 N-INVAS EAR/PLS OXIMETRY MLT: CPT

## 2022-09-30 PROCEDURE — 43235 EGD DIAGNOSTIC BRUSH WASH: CPT | Mod: 51,,, | Performed by: STUDENT IN AN ORGANIZED HEALTH CARE EDUCATION/TRAINING PROGRAM

## 2022-09-30 PROCEDURE — 63600175 PHARM REV CODE 636 W HCPCS: Performed by: HOSPITALIST

## 2022-09-30 PROCEDURE — D9220A PRA ANESTHESIA: ICD-10-PCS | Mod: CRNA,,, | Performed by: NURSE ANESTHETIST, CERTIFIED REGISTERED

## 2022-09-30 PROCEDURE — 43235 EGD DIAGNOSTIC BRUSH WASH: CPT | Performed by: STUDENT IN AN ORGANIZED HEALTH CARE EDUCATION/TRAINING PROGRAM

## 2022-09-30 PROCEDURE — 99239 PR HOSPITAL DISCHARGE DAY,>30 MIN: ICD-10-PCS | Mod: ,,, | Performed by: HOSPITALIST

## 2022-09-30 PROCEDURE — 83735 ASSAY OF MAGNESIUM: CPT | Performed by: HOSPITALIST

## 2022-09-30 PROCEDURE — 27000221 HC OXYGEN, UP TO 24 HOURS

## 2022-09-30 PROCEDURE — 45378 DIAGNOSTIC COLONOSCOPY: CPT | Mod: 53,,, | Performed by: STUDENT IN AN ORGANIZED HEALTH CARE EDUCATION/TRAINING PROGRAM

## 2022-09-30 PROCEDURE — 99900035 HC TECH TIME PER 15 MIN (STAT)

## 2022-09-30 PROCEDURE — D9220A PRA ANESTHESIA: ICD-10-PCS | Mod: ANES,,, | Performed by: ANESTHESIOLOGY

## 2022-09-30 PROCEDURE — C9113 INJ PANTOPRAZOLE SODIUM, VIA: HCPCS | Performed by: HOSPITALIST

## 2022-09-30 PROCEDURE — 25000003 PHARM REV CODE 250: Performed by: HOSPITALIST

## 2022-09-30 PROCEDURE — D9220A PRA ANESTHESIA: Mod: ANES,,, | Performed by: ANESTHESIOLOGY

## 2022-09-30 PROCEDURE — D9220A PRA ANESTHESIA: Mod: CRNA,,, | Performed by: NURSE ANESTHETIST, CERTIFIED REGISTERED

## 2022-09-30 PROCEDURE — 99239 HOSP IP/OBS DSCHRG MGMT >30: CPT | Mod: ,,, | Performed by: HOSPITALIST

## 2022-09-30 PROCEDURE — 37000009 HC ANESTHESIA EA ADD 15 MINS: Performed by: STUDENT IN AN ORGANIZED HEALTH CARE EDUCATION/TRAINING PROGRAM

## 2022-09-30 PROCEDURE — 37000008 HC ANESTHESIA 1ST 15 MINUTES: Performed by: STUDENT IN AN ORGANIZED HEALTH CARE EDUCATION/TRAINING PROGRAM

## 2022-09-30 PROCEDURE — 84100 ASSAY OF PHOSPHORUS: CPT | Performed by: HOSPITALIST

## 2022-09-30 PROCEDURE — 85025 COMPLETE CBC W/AUTO DIFF WBC: CPT | Performed by: HOSPITALIST

## 2022-09-30 PROCEDURE — 25000003 PHARM REV CODE 250: Performed by: NURSE ANESTHETIST, CERTIFIED REGISTERED

## 2022-09-30 PROCEDURE — 63600175 PHARM REV CODE 636 W HCPCS: Performed by: NURSE ANESTHETIST, CERTIFIED REGISTERED

## 2022-09-30 PROCEDURE — 31720 CLEARANCE OF AIRWAYS: CPT

## 2022-09-30 PROCEDURE — 36415 COLL VENOUS BLD VENIPUNCTURE: CPT | Performed by: HOSPITALIST

## 2022-09-30 PROCEDURE — 43235 PR EGD, FLEX, DIAGNOSTIC: ICD-10-PCS | Mod: 51,,, | Performed by: STUDENT IN AN ORGANIZED HEALTH CARE EDUCATION/TRAINING PROGRAM

## 2022-09-30 PROCEDURE — 80053 COMPREHEN METABOLIC PANEL: CPT | Performed by: HOSPITALIST

## 2022-09-30 PROCEDURE — 45378 PR COLONOSCOPY,DIAGNOSTIC: ICD-10-PCS | Mod: 53,,, | Performed by: STUDENT IN AN ORGANIZED HEALTH CARE EDUCATION/TRAINING PROGRAM

## 2022-09-30 PROCEDURE — 94640 AIRWAY INHALATION TREATMENT: CPT

## 2022-09-30 PROCEDURE — 25000242 PHARM REV CODE 250 ALT 637 W/ HCPCS: Performed by: PHYSICIAN ASSISTANT

## 2022-09-30 RX ORDER — LANSOPRAZOLE 30 MG/1
30 TABLET, ORALLY DISINTEGRATING, DELAYED RELEASE ORAL DAILY
Qty: 30 TABLET | Refills: 1 | Status: SHIPPED | OUTPATIENT
Start: 2022-09-30 | End: 2022-09-30 | Stop reason: HOSPADM

## 2022-09-30 RX ORDER — ONDANSETRON 2 MG/ML
4 INJECTION INTRAMUSCULAR; INTRAVENOUS DAILY PRN
Status: CANCELLED | OUTPATIENT
Start: 2022-09-30

## 2022-09-30 RX ORDER — LIDOCAINE HYDROCHLORIDE 20 MG/ML
INJECTION INTRAVENOUS
Status: DISCONTINUED | OUTPATIENT
Start: 2022-09-30 | End: 2022-09-30

## 2022-09-30 RX ORDER — OMEPRAZOLE 40 MG/1
CAPSULE, DELAYED RELEASE ORAL
Qty: 60 CAPSULE | Refills: 2 | Status: ON HOLD | OUTPATIENT
Start: 2022-09-30 | End: 2024-01-24

## 2022-09-30 RX ORDER — FENTANYL CITRATE 50 UG/ML
25 INJECTION, SOLUTION INTRAMUSCULAR; INTRAVENOUS EVERY 5 MIN PRN
Status: CANCELLED | OUTPATIENT
Start: 2022-09-30

## 2022-09-30 RX ORDER — LANSOPRAZOLE 30 MG/1
30 TABLET, ORALLY DISINTEGRATING, DELAYED RELEASE ORAL DAILY
Qty: 30 TABLET | Refills: 1 | Status: SHIPPED | OUTPATIENT
Start: 2022-09-30 | End: 2022-09-30 | Stop reason: SDUPTHER

## 2022-09-30 RX ORDER — PROPOFOL 10 MG/ML
VIAL (ML) INTRAVENOUS
Status: DISCONTINUED | OUTPATIENT
Start: 2022-09-30 | End: 2022-09-30

## 2022-09-30 RX ORDER — PROPOFOL 10 MG/ML
VIAL (ML) INTRAVENOUS CONTINUOUS PRN
Status: DISCONTINUED | OUTPATIENT
Start: 2022-09-30 | End: 2022-09-30

## 2022-09-30 RX ORDER — MORPHINE SULFATE 2 MG/ML
2 INJECTION, SOLUTION INTRAMUSCULAR; INTRAVENOUS ONCE
Status: COMPLETED | OUTPATIENT
Start: 2022-09-30 | End: 2022-09-30

## 2022-09-30 RX ADMIN — IPRATROPIUM BROMIDE AND ALBUTEROL SULFATE 3 ML: 2.5; .5 SOLUTION RESPIRATORY (INHALATION) at 12:09

## 2022-09-30 RX ADMIN — ATORVASTATIN CALCIUM 20 MG: 20 TABLET, FILM COATED ORAL at 01:09

## 2022-09-30 RX ADMIN — LEVOFLOXACIN 750 MG: 750 TABLET, FILM COATED ORAL at 01:09

## 2022-09-30 RX ADMIN — SODIUM CHLORIDE: 0.9 INJECTION, SOLUTION INTRAVENOUS at 11:09

## 2022-09-30 RX ADMIN — PANTOPRAZOLE SODIUM 40 MG: 40 INJECTION, POWDER, FOR SOLUTION INTRAVENOUS at 01:09

## 2022-09-30 RX ADMIN — IPRATROPIUM BROMIDE AND ALBUTEROL SULFATE 3 ML: 2.5; .5 SOLUTION RESPIRATORY (INHALATION) at 01:09

## 2022-09-30 RX ADMIN — PREDNISONE 10 MG: 5 SOLUTION ORAL at 01:09

## 2022-09-30 RX ADMIN — LIDOCAINE HYDROCHLORIDE 75 MG: 20 INJECTION INTRAVENOUS at 11:09

## 2022-09-30 RX ADMIN — PROPOFOL 150 MCG/KG/MIN: 10 INJECTION, EMULSION INTRAVENOUS at 11:09

## 2022-09-30 RX ADMIN — PROPOFOL 20 MG: 10 INJECTION, EMULSION INTRAVENOUS at 11:09

## 2022-09-30 RX ADMIN — METOPROLOL SUCCINATE 200 MG: 100 TABLET, EXTENDED RELEASE ORAL at 01:09

## 2022-09-30 RX ADMIN — MORPHINE SULFATE 2 MG: 2 INJECTION, SOLUTION INTRAMUSCULAR; INTRAVENOUS at 03:09

## 2022-09-30 NOTE — NURSING TRANSFER
Nursing Transfer Note      9/30/2022     Reason patient is being transferred: back to room post endo recovery    Transfer To: [id number]    Transfer via stretcher    Transfer with cardiac monitoring, O2    Transported by PCT    Medicines sent: none    Any special needs or follow-up needed: no    Chart send with patient: Yes    Patient reassessed at: 1211

## 2022-09-30 NOTE — PLAN OF CARE
[facility] scheduled pt's hospital f/u visit on 10/3/22 @ 11:00am.   Gastroenterology will make contact with pt to schedule his hospital f/u due to appointments being far out past December.

## 2022-09-30 NOTE — DISCHARGE SUMMARY
[facility] - Telemetry [facility] Medicine  Discharge Summary      Patient Name: Fareed Richard Jr.  MRN: [id number]  Patient Class: IP- Inpatient  Admission Date: 9/23/2022  Hospital Length of Stay: 7 days  Discharge Date and Time:  09/30/2022 1:19 PM  Attending Physician: Brenda Saldivar MD   Discharging Provider: Brenda Saldivar MD  Primary Care Provider: Kodi Tubbs MD  Hospital Medicine Team: [facility] MED N Brenda Saldivar MD    HPI:   Mr. Fareed Richard Jr. is a 72 y.o. 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  History is obtained from wife at bedside.  She reports that at baseline, he is on 5L trach collar.  However, for the last 1-2 weeks he has been having worsening respiratory issues.  He is currently on a prolonged Prednisone taper.  He went to the [facility] ER on 9/14 for hemoptysis and bloody trach secretions.  Hemoglobin was 8.4.  At that encounter, he was evaluated by ENT who felt like his airway was patent.  His hemoptysis stopped before leaving the ER.  He was discharged with Augmentin for aspiration pneumonia seen on CT scan.  However, on the day of admission he complained of chest pain, shortness of breath, and worsening fatigue.  Wife also endorsed yellow/green and foul colored trach secretions, but no fever or chills.  She suctioned him, which did not help - so she had to increase his home oxygen from 5L to 10L.  EMS was called and suctioned him, getting his oxygen saturations to 100% on 5L NC.  He denies any dark stools to his knowledge, but is on Aspirin and Plavix.  Chart review showed multiple respiratory cultures with Pseudomonas, unclear if colonization.    Upon arrival to the ER, vitals were temp 98.2F, HR 87, /78 satting 100% on 10L trach collar, 60% FiO2.  Labs showed Hemoglobin 5.5 and BNP  468.  CXR showed pulmonary vascular and interstitial markings with effusions, no clear consolidation.  HAYLEE was positive for melena.  He was given 1L NS, Vanc, Zosyn.  2 units PRBCs were ordered and he was given IV PPI.  He was admitted to Hospital Medicine for further management.      Procedure(s) (LRB):  EGD (ESOPHAGOGASTRODUODENOSCOPY) (N/A)  COLONOSCOPY (N/A)      Hospital Course:   Mr. Richard was admitted to Hospital Medicine for management of acute on chronic respiratory failure 2/2 tracheitis/pneumonia, as well as a GIB.  He was started transfused 2 units PRBCs, started on IV PPI and GI was consulted.  He was started on Zosyn and Tobramycin nebs for pneumonia/tracheitis.  Resp cx returned with pan sensitive Pseudomonas.  ID was consulted.  He was changed from Zosyn to Levaquin given the high AMRITA.  GI attempted EGD/cscope 9/27, but the prep was poor for his cscope, and he desatted requiring 8L with sedation, so neither procedure was performed.  Repeat CT chest was ordered, as well as intense RT intervention to optimize respiratory status so that GI can perform the procedures at the end of the week when respiratory status has been optimized and is more stable.  CT chest showed multifocal pneumonia.  He went for repeat EGD 9/30 that showed LA Grade B reflux esophagitis with no bleeding.  GI suggested PPI daily.  Cscope was unable to be performed given stool in the rectum.  He was restarted on Aspirin and Plavix on discharge given his multiple stents in various parts of his body.       Goals of Care Treatment Preferences:  Code Status: Full Code      Consults:   Consults (From admission, onward)        Status Ordering Provider     Inpatient consult to Infectious Diseases  Once        Provider:  (Not yet assigned)    Completed RAJIV PANDYA     Inpatient consult to Registered Dietitian/Nutritionist  Once        Provider:  (Not yet assigned)    Completed RAJIV PANDYA     Inpatient consult to Gastroenterology   Once        Provider:  (Not yet assigned)    Completed MAT THAO new Assessment & Plan notes have been filed under this hospital service since the last note was generated.  Service: Hospital Medicine    Final Active Diagnoses:    Diagnosis Date Noted POA    PRINCIPAL PROBLEM:  Acute on chronic respiratory failure [J96.20] 04/19/2022 Yes    Acute tracheitis [J04.10] 09/23/2022 Yes    Pneumonia [J18.9] 09/23/2022 Yes    Tracheostomy present [Z93.0] 09/23/2022 Not Applicable    Gastrointestinal hemorrhage with melena [K92.1] 09/23/2022 Yes    Acute blood loss anemia [D62] 02/16/2022 Yes    PEG (percutaneous endoscopic gastrostomy) status [Z93.1] 09/23/2022 Not Applicable    Elevated brain natriuretic peptide (BNP) level [R79.89] 09/23/2022 Yes    Dysphagia [R13.10] 04/21/2022 Yes    Coronary artery disease involving native coronary artery of native heart without angina pectoris [I25.10] 12/29/2021 Yes    Other hyperlipidemia [E78.49] 12/29/2021 Yes    Primary hypertension [I10] 12/29/2021 Yes    Squamous cell cancer of tongue [C02.9] 12/16/2021 Yes    Peripheral vascular disease [I73.9] 03/18/2016 Yes      Problems Resolved During this Admission:       Discharged Condition: fair    Disposition:     Follow Up:    Patient Instructions:      CBC W/ AUTO DIFFERENTIAL   Standing Status: Future Standing Exp. Date: 11/26/23     Ambulatory referral/consult to Gastroenterology   Standing Status: Future   Referral Priority: Routine Referral Type: Consultation   Referral Reason: Specialty Services Required   Requested Specialty: Gastroenterology   Number of Visits Requested: 1       Significant Diagnostic Studies: Labs:   CBC   Recent Labs   Lab 09/30/22 0317   WBC 5.15   HGB 8.4*   HCT 28.0*          Pending Diagnostic Studies:     Procedure Component Value Units Date/Time    Lactic acid, plasma [[id number]] Collected: 09/23/22 1247    Order Status: Sent Lab Status: In process Updated:  22 1248    Specimen: Blood          Medications:  Reconciled Home Medications:      Medication List      START taking these medications    levoFLOXacin 750 MG tablet  Commonly known as: LEVAQUIN  1 tablet (750 mg total) by Per G Tube route once daily. for 5 days     omeprazole 40 MG capsule  Commonly known as: PRILOSEC  Take 40 mg (contents of one capsule) twice daily through PEG tube. Mix contents of capsule with 10 mL applesauce.     ondansetron 8 MG Tbdl  Commonly known as: ZOFRAN-ODT  Take 1 tablet (8 mg total) by mouth every 8 (eight) hours as needed (nausea).        CONTINUE taking these medications    aspirin 81 MG Chew  1 tablet (81 mg total) by Per G Tube route once daily.     atorvastatin 20 MG tablet  Commonly known as: LIPITOR  1 tablet (20 mg total) by Per G Tube route once daily.     bisacodyL 10 mg Supp  Commonly known as: DULCOLAX  Place 1 suppository (10 mg total) rectally daily as needed.     clopidogreL 75 mg tablet  Commonly known as: PLAVIX  1 tablet (75 mg total) by Per G Tube route once daily.     glycopyrrolate 1 mg/5 mL (0.2 mg/mL) Soln  Commonly known as: CUVPOSA  Take 5 mLs (1 mg total) by mouth 3 (three) times daily as needed (TO REDUCE SECRETIONS).     guaiFENesin 200 mg/5 mL Liqd  Take 400 mg by mouth every 4 (four) hours as needed (for secretions).     hydrOXYzine HCL 25 MG tablet  Commonly known as: ATARAX  SMARTSI.5 Tablet(s) Gastro Tube Every 8 Hours PRN     melatonin 3 mg tablet  Commonly known as: MELATIN  2 tablets (6 mg total) by Per G Tube route nightly.     metoprolol succinate 200 MG 24 hr tablet  Commonly known as: TOPROL-XL  Take 200 mg by mouth 2 (two) times daily.     oxyCODONE 5 mg/5 mL Soln  Commonly known as: ROXICODONE  5 mLs (5 mg total) by Per G Tube route every 4 (four) hours as needed (Pain).     polyethylene glycol 17 gram Pwpk  Commonly known as: GLYCOLAX  17 g by Per G Tube route 2 (two) times daily.     prednisone 5 mg/5 mL Soln  Take 20 mLs (20 mg  total) by mouth once daily for 7 days, THEN 10 mLs (10 mg total) once daily for 7 days.  Start taking on: 2022     sodium chloride 0.65 % nasal spray  Commonly known as: OCEAN  1 spray by Nasal route as needed for Congestion.     WIXELA INHUB 250-50 mcg/dose diskus inhaler  Generic drug: fluticasone-salmeterol 250-50 mcg/dose  SMARTSI Puff(s) By Mouth Every 12 Hours        STOP taking these medications    amoxicillin-clavulanate 875-125mg 875-125 mg per tablet  Commonly known as: AUGMENTIN            Indwelling Lines/Drains at time of discharge:   Lines/Drains/Airways     Drain  Duration                Gastrostomy/Enterostomy 22 Percutaneous endoscopic gastrostomy (PEG)  days          Airway  Duration                Surgical Airway 22 1014 Uncuffed 158 days                Time spent on the discharge of patient: 35 minutes         Brenda Saldivar MD  Department of Hospital Medicine  [facility] - Telemetry Stepdown

## 2022-09-30 NOTE — PROVATION PATIENT INSTRUCTIONS
Discharge Summary/Instructions after an Endoscopic Procedure  Patient Name: Fareed Richard  Patient MRN: [id number]  Patient YOB: 1950 Friday, September 30, 2022  Joy Cabrera MD  Dear patient,  As a result of recent federal legislation (The Federal Cures Act), you may   receive lab or pathology results from your procedure in your [facility]   account before your physician is able to contact you. Your physician or   their representative will relay the results to you with their   recommendations at their soonest availability.  Thank you,  RESTRICTIONS:  During your procedure today, you received medications for sedation.  These   medications may affect your judgment, balance and coordination.  Therefore,   for 24 hours, you have the following restrictions:   - DO NOT drive a car, operate machinery, make legal/financial decisions,   sign important papers or drink alcohol.    ACTIVITY:  Today: no heavy lifting, straining or running due to procedural   sedation/anesthesia.  The following day: return to full activity including work.  DIET:  Eat and drink normally unless instructed otherwise.     TREATMENT FOR COMMON SIDE EFFECTS:  - Mild abdominal pain, nausea, belching, bloating or excessive gas:  rest,   eat lightly and use a heating pad.  - Sore Throat: treat with throat lozenges and/or gargle with warm salt   water.  - Because air was used during the procedure, expelling large amounts of air   from your rectum or belching is normal.  - If a bowel prep was taken, you may not have a bowel movement for 1-3 days.    This is normal.  SYMPTOMS TO WATCH FOR AND REPORT TO YOUR PHYSICIAN:  1. Abdominal pain or bloating, other than gas cramps.  2. Chest pain.  3. Back pain.  4. Signs of infection such as: chills or fever occurring within 24 hours   after the procedure.  5. Rectal bleeding, which would show as bright red, maroon, or black stools.   (A tablespoon of blood from the rectum is not serious, especially  if   hemorrhoids are present.)  6. Vomiting.  7. Weakness or dizziness.  GO DIRECTLY TO THE NEAREST EMERGENCY ROOM IF YOU HAVE ANY OF THE FOLLOWING:      Difficulty breathing              Chills and/or fever over 101 F   Persistent vomiting and/or vomiting blood   Severe abdominal pain   Severe chest pain   Black, tarry stools   Bleeding- more than one tablespoon   Any other symptom or condition that you feel may need urgent attention  Your doctor recommends these additional instructions:  If any biopsies were taken, your doctors clinic will contact you in 1 to 2   weeks with any results.  - Return patient to hospital [facility] for ongoing care.   - Advance diet as tolerated.   - Continue present medications.   - Repeat colonoscopy at appointment to be scheduled for screening purposes   due to poor prep.   - Ok to resume plavix   - Recommend CTA for any evidence of recurrent bleeding  For questions, problems or results please call your physician - Joy Cabrera MD at Work:  [phone].  [facility], EMERGENCY ROOM PHONE NUMBER: [phone]  IF A COMPLICATION OR EMERGENCY SITUATION ARISES AND YOU ARE UNABLE TO REACH   YOUR PHYSICIAN - GO DIRECTLY TO THE EMERGENCY ROOM.  Joy Cabrera MD  9/30/2022 11:32:39 AM  This report has been verified and signed electronically.  Dear patient,  As a result of recent federal legislation (The Federal Cures Act), you may   receive lab or pathology results from your procedure in your [facility]   account before your physician is able to contact you. Your physician or   their representative will relay the results to you with their   recommendations at their soonest availability.  Thank you,  PROVATION

## 2022-09-30 NOTE — PLAN OF CARE
SSC met with patient/family at bedside. Patient experience rounding completed and reviewed the following.     Do you know your discharge plan? Yes,  If yes, what is the plan? Home    If you are discharging home, do you have help at home? Yes     Do you think you will need help at home at discharge? Yes    Have you discussed your needs and preferences with your SW/CM? Yes      Assigned SW/CM notified of any patient/family needs or concerns.

## 2022-09-30 NOTE — PROVATION PATIENT INSTRUCTIONS
Discharge Summary/Instructions after an Endoscopic Procedure  Patient Name: Fareed Richard  Patient MRN: [id number]  Patient YOB: 1950 Friday, September 30, 2022  Joy Cabrera MD  Dear patient,  As a result of recent federal legislation (The Federal Cures Act), you may   receive lab or pathology results from your procedure in your [facility]   account before your physician is able to contact you. Your physician or   their representative will relay the results to you with their   recommendations at their soonest availability.  Thank you,  RESTRICTIONS:  During your procedure today, you received medications for sedation.  These   medications may affect your judgment, balance and coordination.  Therefore,   for 24 hours, you have the following restrictions:   - DO NOT drive a car, operate machinery, make legal/financial decisions,   sign important papers or drink alcohol.    ACTIVITY:  Today: no heavy lifting, straining or running due to procedural   sedation/anesthesia.  The following day: return to full activity including work.  DIET:  Eat and drink normally unless instructed otherwise.     TREATMENT FOR COMMON SIDE EFFECTS:  - Mild abdominal pain, nausea, belching, bloating or excessive gas:  rest,   eat lightly and use a heating pad.  - Sore Throat: treat with throat lozenges and/or gargle with warm salt   water.  - Because air was used during the procedure, expelling large amounts of air   from your rectum or belching is normal.  - If a bowel prep was taken, you may not have a bowel movement for 1-3 days.    This is normal.  SYMPTOMS TO WATCH FOR AND REPORT TO YOUR PHYSICIAN:  1. Abdominal pain or bloating, other than gas cramps.  2. Chest pain.  3. Back pain.  4. Signs of infection such as: chills or fever occurring within 24 hours   after the procedure.  5. Rectal bleeding, which would show as bright red, maroon, or black stools.   (A tablespoon of blood from the rectum is not serious, especially  if   hemorrhoids are present.)  6. Vomiting.  7. Weakness or dizziness.  GO DIRECTLY TO THE NEAREST EMERGENCY ROOM IF YOU HAVE ANY OF THE FOLLOWING:      Difficulty breathing              Chills and/or fever over 101 F   Persistent vomiting and/or vomiting blood   Severe abdominal pain   Severe chest pain   Black, tarry stools   Bleeding- more than one tablespoon   Any other symptom or condition that you feel may need urgent attention  Your doctor recommends these additional instructions:  If any biopsies were taken, your doctors clinic will contact you in 1 to 2   weeks with any results.  - Return patient to hospital [place] for ongoing care.   - Advance diet as tolerated.   - Continue present medications. PPI daily  For questions, problems or results please call your physician - Joy Cabrera MD at Work:  [phone].  [facility], EMERGENCY ROOM PHONE NUMBER: [phone]  IF A COMPLICATION OR EMERGENCY SITUATION ARISES AND YOU ARE UNABLE TO REACH   YOUR PHYSICIAN - GO DIRECTLY TO THE EMERGENCY ROOM.  Joy Cabrera MD  9/30/2022 11:29:57 AM  This report has been verified and signed electronically.  Dear patient,  As a result of recent federal legislation (The Federal Cures Act), you may   receive lab or pathology results from your procedure in your [facility]   account before your physician is able to contact you. Your physician or   their representative will relay the results to you with their   recommendations at their soonest availability.  Thank you,  PROVATION

## 2022-09-30 NOTE — TELEPHONE ENCOUNTER
----- Message from Perri Wolff sent at 9/30/2022  1:54 PM CDT -----  Regarding: Appointment  Contact: [phone]  Calling in regards to getting patient scheduled for hospital follow appointment up in two weeks. Please call and schedule.

## 2022-09-30 NOTE — PT/OT/SLP PROGRESS
Physical Therapy      Patient Name:  Fareed Richard Jr.   MRN:  [id number]    Patient not seen today secondary to Other (Comment) (pt anticipating discharge and wanted to save up energy to walk out of hospital.). Will follow up 10/3/2022

## 2022-09-30 NOTE — H&P
Short Stay Endoscopy History and Physical    PCP - Kodi Tubbs MD  Referring Physician - Aaareferral Self  No address on file    Procedure - EGD and Colonoscopy  ASA - per anesthesia  Mallampati - per anesthesia  History of Anesthesia problems - no  Family history Anesthesia problems -  no   Plan of anesthesia - General    HPI  72 y.o. male    Reason for procedure:   Acute blood loss anemia [D62]      ROS:  Constitutional: No fevers, chills, No weight loss  CV: No chest pain  Pulm: No cough, No shortness of breath  GI: see HPI    Medical History:  has a past medical history of Cancer, COPD (chronic obstructive pulmonary disease), Hyperlipidemia, Hypertension, and Pseudoaneurysm.    Surgical History:  has a past surgical history that includes Skin cancer excision; Coronary stent placement (01/2000); Eye surgery; Abdominal surgery; Carotid stent; femoral stents; Radical neck dissection (Left, 1/3/2022); Flap procedure (N/A, 1/3/2022); Glossectomy (N/A, 1/4/2022); Tracheotomy (N/A, 1/4/2022); Dissection of neck (Bilateral, 1/4/2022); Esophagogastroduodenoscopy w/ PEG (N/A, 1/4/2022); Flap procedure (Right, 1/4/2022); Esophagogastroduodenoscopy (N/A, 9/27/2022); and Colonoscopy (N/A, 9/27/2022).    Family History: family history is not on file.    Social History:  reports that he quit smoking about 4 years ago. His smoking use included cigarettes. He started smoking about 59 years ago. He has a 80.00 pack-year smoking history. He has never used smokeless tobacco. He reports that he does not currently use alcohol after a past usage of about 3.0 standard drinks per week. He reports that he does not use drugs.    Review of patient's allergies indicates:  No Known Allergies    Medications:   Medications Prior to Admission   Medication Sig Dispense Refill Last Dose    aspirin 81 MG Chew 1 tablet (81 mg total) by Per G Tube route once daily.  0     atorvastatin (LIPITOR) 20 MG tablet 1 tablet (20 mg total) by Per G Tube  route once daily. 90 tablet 3     bisacodyL (DULCOLAX) 10 mg Supp Place 1 suppository (10 mg total) rectally daily as needed.  0     clopidogreL (PLAVIX) 75 mg tablet 1 tablet (75 mg total) by Per G Tube route once daily. 30 tablet 11     glycopyrrolate (CUVPOSA) 1 mg/5 mL (0.2 mg/mL) Soln Take 5 mLs (1 mg total) by mouth 3 (three) times daily as needed (TO REDUCE SECRETIONS). 473 mL 1     guaiFENesin 200 mg/5 mL Liqd Take 400 mg by mouth every 4 (four) hours as needed (for secretions). 473 mL 3     hydrOXYzine HCL (ATARAX) 25 MG tablet SMARTSI.5 Tablet(s) Gastro Tube Every 8 Hours PRN       melatonin (MELATIN) 3 mg tablet 2 tablets (6 mg total) by Per G Tube route nightly.  0     metoprolol succinate (TOPROL-XL) 200 MG 24 hr tablet Take 200 mg by mouth 2 (two) times daily.       ondansetron (ZOFRAN-ODT) 8 MG TbDL Take 1 tablet (8 mg total) by mouth every 8 (eight) hours as needed (nausea). 30 tablet 1     oxyCODONE (ROXICODONE) 5 mg/5 mL Soln 5 mLs (5 mg total) by Per G Tube route every 4 (four) hours as needed (Pain). 150 mL 0     polyethylene glycol (GLYCOLAX) 17 gram PwPk 17 g by Per G Tube route 2 (two) times daily.  0     prednisone 5 mg/5 mL Soln Take 20 mLs (20 mg total) by mouth once daily for 7 days, THEN 10 mLs (10 mg total) once daily for 7 days. 1120 mL 0     sodium chloride (OCEAN) 0.65 % nasal spray 1 spray by Nasal route as needed for Congestion.  12     WIXELA INHUB 250-50 mcg/dose diskus inhaler SMARTSI Puff(s) By Mouth Every 12 Hours       [DISCONTINUED] amoxicillin-clavulanate 875-125mg (AUGMENTIN) 875-125 mg per tablet Take 1 tablet by mouth 2 (two) times daily. for 10 days 20 tablet 0        Physical Exam:    Vital Signs:   Vitals:    22 0957   BP: (!) 161/76   Pulse: 74   Resp: 16   Temp: 98.2 °F (36.8 °C)       General Appearance: Well appearing in no acute distress  Abdomen: Soft, non tender, non distended with normal bowel sounds, no masses    Labs:  Lab Results   Component  Value Date    WBC 5.15 09/30/2022    HGB 8.4 (L) 09/30/2022    HCT 28.0 (L) 09/30/2022     09/30/2022    ALT 16 09/30/2022    AST 16 09/30/2022     09/30/2022    K 3.6 09/30/2022    CL 97 09/30/2022    CREATININE 0.9 09/30/2022    BUN 23 09/30/2022    CO2 34 (H) 09/30/2022    TSH 2.279 08/24/2022    INR 1.0 09/14/2022    HGBA1C 6.3 (H) 09/27/2022       I have explained the risks and benefits of this endoscopic procedure to the patient including but not limited to bleeding, inflammation, infection, perforation, and death.    Assessment/Plan:     Acute blood loss anemia   Melena     - Proceed with EGD and colonoscopy       Joy Cabrera MD  Gastroenterology   [facility]

## 2022-09-30 NOTE — ASSESSMENT & PLAN NOTE
· GIB Pathway initiated  · Hgb 5.5 on admit, baseline around 8 as of 2 weeks ago - now stable s/p 2 units PRBCs  · Continue Protonix 40mg IV BID  · GI consulted, appreciate assistance  · EGD/cscope 9/27 aborted because of desats and poor prep  · Plan for repeat procedures 9/30  · Transfuse for Hemoglobin <7

## 2022-09-30 NOTE — NURSING
Pt d/c home with wife at apx 1520. Piv x2 removed. Tele monitoring removed as ordered. All belongings gathered by wife and taken home with them. AVS given to pt and wife. All questions answered. Pt left this level of care stable.

## 2022-09-30 NOTE — SUBJECTIVE & OBJECTIVE
Interval History: No acute events overnight.  Hgb stable.  EGD/cscope today then DC after.     Review of Systems   Constitutional:  Negative for chills, fatigue and fever.   Respiratory:  Negative for cough and shortness of breath.    Cardiovascular:  Negative for chest pain, palpitations and leg swelling.   Gastrointestinal:  Negative for abdominal pain, diarrhea, nausea and vomiting.   Genitourinary:  Negative for dysuria and urgency.   Neurological:  Negative for dizziness and headaches.   All other systems reviewed and are negative.  Objective:     Vital Signs (Most Recent):  Temp: 98.2 °F (36.8 °C) (09/30/22 0957)  Pulse: 74 (09/30/22 0957)  Resp: 16 (09/30/22 0957)  BP: (!) 161/76 (09/30/22 0957)  SpO2: (!) 93 % (5L trach collar) (09/30/22 0957)   Vital Signs (24h Range):  Temp:  [96.7 °F (35.9 °C)-98.8 °F (37.1 °C)] 98.2 °F (36.8 °C)  Pulse:  [73-89] 74  Resp:  [16-22] 16  SpO2:  [78 %-98 %] 93 %  BP: ()/(57-76) 161/76     Weight: 59.9 kg (132 lb)  Body mass index is 23.38 kg/m².    Intake/Output Summary (Last 24 hours) at 9/30/2022 1047  Last data filed at 9/30/2022 0629  Gross per 24 hour   Intake 60 ml   Output 600 ml   Net -540 ml        Physical Exam  Constitutional:       Appearance: Normal appearance. He is well-developed.   HENT:      Head: Normocephalic and atraumatic.   Neck:      Comments: Trach  Cardiovascular:      Rate and Rhythm: Normal rate and regular rhythm.      Heart sounds: No murmur heard.  Pulmonary:      Effort: Pulmonary effort is normal. No respiratory distress.      Breath sounds: Normal breath sounds. No wheezing or rales.   Abdominal:      General: There is no distension.      Palpations: Abdomen is soft.      Tenderness: There is no abdominal tenderness.      Comments: Peg   Musculoskeletal:         General: No deformity.   Skin:     General: Skin is warm.      Coloration: Skin is not pale.   Neurological:      General: No focal deficit present.      Mental Status: He is  alert and oriented to person, place, and time. Mental status is at baseline.       Significant Labs: All pertinent labs within the past 24 hours have been reviewed.  CBC:   Recent Labs   Lab 09/30/22  0317   WBC 5.15   HGB 8.4*   HCT 28.0*          Respiratory Culture:   No results for input(s): GSRESP, RESPIRATORYC in the last 48 hours.      Significant Imaging: I have reviewed all pertinent imaging results/findings within the past 24 hours.

## 2022-09-30 NOTE — RESPIRATORY THERAPY
RAPID RESPONSE RESPIRATORY THERAPY PROACTIVE NOTE           Time of visit: 907     Code Status: Full Code   : 1950  Bed: [facility] ROOM 2ND *:   MRN: [id number]  Time spent at the bedside: < 15 min    SITUATION    Evaluated patient for: LDA Check     BACKGROUND    Patient has a past medical history of Cancer, COPD (chronic obstructive pulmonary disease), Hyperlipidemia, Hypertension, and Pseudoaneurysm.  Clinically Significant Surgical Hx: tracheostomy    24 Hours Vitals Range:  Temp:  [96.7 °F (35.9 °C)-98.8 °F (37.1 °C)]   Pulse:  [72-89]   Resp:  [15-]   BP: ()/(57-76)   SpO2:  [78 %-98 %]     Labs:    Recent Labs     22  0317      K 3.6   CL 97   CO2 34*   CREATININE 0.9      PHOS 2.4*   MG 1.7        No results for input(s): PH, PCO2, PO2, HCO3, POCSATURATED, BE in the last 72 hours.    ASSESSMENT/INTERVENTIONS  Pt resting in bed, no concern for respiratory at time of visit. Trach clean and secure, all supplies at bedside.      Last VS   Temp: 98.2 °F (36.8 °C) (957)  Pulse: 74 (957)  Resp: 16 (957)  BP: 161/76 (957)  SpO2: 93 % (957) Comment: 5L trach collar      Extra trachs at bedside: 6.0 & 4.0 Shiley Cuffed  Level of Consciousness: Level of Consciousness (AVPU): alert  Respiratory Effort: Respiratory Effort: Unlabored Expansion/Accessory Muscle Usage: Expansion/Accessory Muscles/Retractions: no use of accessory muscles, no retractions, expansion symmetric  All Lung Field Breath Sounds: All Lung Fields Breath Sounds: Anterior:, Lateral:, diminished  DEE Breath Sounds: wheezes, expiratory  LLL Breath Sounds: diminished  RUL Breath Sounds: diminished  RML Breath Sounds: diminished  RLL Breath Sounds: diminished  O2 Device/Concentration: trach collar @ 5L/28%  Surgical airway: Yes, Type: Shiley Size: 6, uncuffed  Ambu at bedside: Ambu bag with the patient?: Yes, Adult Ambu     Active Orders   Respiratory Care    Chest physiotherapy TID      Frequency: TID     Number of Occurrences: Until Specified     Order Questions:      Indications: COPIOUS SPUTUM PRODUCTION    Inhalation Treatment Q4H PRN     Frequency: Q4H PRN     Number of Occurrences: Until Specified    Oxygen Continuous     Frequency: Continuous     Number of Occurrences: Until Specified     Order Comments: Discontinue when Sp02 is greater than or equal to 95% of equal to Preop Sp02       Order Questions:      Device type: Low flow      Device: Simple Face Mask      Titrate O2 per Oxygen Titration Protocol: Yes      Notify MD of: Inability to achieve desired SpO2    Pulse Oximetry Continuous     Frequency: Continuous     Number of Occurrences: Until Specified    RESPIRATORY COMMUNICATION     Frequency: Q4H     Number of Occurrences: Until Specified     Order Comments: Please help optimize respiratory status.  Desatted during EGD/cscope      Routine tracheostomy care     Frequency: BID     Number of Occurrences: Until Specified       RECOMMENDATIONS    We recommend: RRT Recs: Continue POC per primary team.      FOLLOW-UP    Please call back the Rapid Response RT, Leo Reilly, RRT at x [phone] for any questions or concerns.

## 2022-09-30 NOTE — TRANSFER OF CARE
"Anesthesia Transfer of Care Note    Patient: Fareed Richard Jr.    Procedure(s) Performed: Procedure(s) (LRB):  EGD (ESOPHAGOGASTRODUODENOSCOPY) (N/A)  COLONOSCOPY (N/A)    Patient location: PACU    Anesthesia Type: general    Transport from OR: Transported from OR on 6-10 L/min O2 by face mask with adequate spontaneous ventilation. Upon arrival to PACU/ICU, patient attached to 100% O2 by T-piece with adequate spontaneous ventilation    Post pain: adequate analgesia    Post assessment: no apparent anesthetic complications and tolerated procedure well    Post vital signs: stable    Level of consciousness: awake, alert and oriented    Nausea/Vomiting: no nausea/vomiting    Complications: none    Transfer of care protocol was followed      Last vitals:   Visit Vitals  BP (!) 143/64   Pulse 69   Temp 36.7 °C (98.1 °F) (Temporal)   Resp 16   Ht 5' 3" (1.6 m)   Wt 59.9 kg (132 lb)   SpO2 (!) 91%   BMI 23.38 kg/m²     "

## 2022-09-30 NOTE — TREATMENT PLAN
GI Treatment Plan:    EGD  Impression:            - LA Grade B reflux esophagitis with no bleeding.                          - Erythematous mucosa in the antrum.                          - Normal duodenal bulb, first portion of the                          duodenum and second portion of the duodenum.                          - No specimens collected.   Recommendation:        - Return patient to hospital [place] for ongoing care.                          - Advance diet as tolerated.                          - Continue present medications. PPI daily     Colonoscopy  Impression:            - Preparation of the colon was poor.                          - Stool in the rectum.                          - No specimens collected.   Recommendation:        - Return patient to hospital [place] for ongoing care.                          - Advance diet as tolerated.                          - Continue present medications.                          - Repeat colonoscopy at appointment to be                          scheduled for screening purposes due to poor prep.                          - Ok to resume plavix                          - Recommend CTA for any evidence of recurrent                          bleeding     Please call back with questions or if patient has change in clinical status.    Melecio Wheeler MD  Gastroenterology/Hepatology, PGY IV  [facility]

## 2022-09-30 NOTE — ASSESSMENT & PLAN NOTE
· Chronic and stable  · Hold Aspirin and Plavix with GIB  · Continue Statin  · Continue Metoprolol

## 2022-09-30 NOTE — PLAN OF CARE
[facility] - Telemetry Stepdown  Discharge Final Note    Primary Care Provider: Kodi Tubbs MD    Expected Discharge Date: 9/30/2022      Future Appointments   Date Time Provider Department Center   10/12/2022  8:00 AM Salvador Wadsworth MD [facility] PULMSVC [address]   10/19/2022 11:30 AM [facility] OIC-CT2 500 LB LIMIT [facility] IC [facility]   10/25/2022  2:30 PM Naeem Smalls MD [facility] HNSO [address]   11/23/2022  7:30 AM LAB, HEMONC CANCER BLDG [facility] LAB HO [address]   11/23/2022  8:30 AM Christopher Jay MD [facility] HEMONC3 [address]          Final Discharge Note (most recent)       Final Note - 09/30/22 1545          Final Note    Assessment Type Final Discharge Note     Anticipated Discharge Disposition Home or Self Care     What phone number can be called within the next 1-3 days to see how you are doing after discharge? [phone]     Hospital Resources/Appts/Education Provided Appointments scheduled and added to AVS   Ambulatory referral sent to GI.       Post-Acute Status    Post-Acute Authorization HME     HME Status Set-up Complete/Auth obtained   BSC will be delivered to patient's home on Monday 10/3.    Discharge Delays None known at this time                     Important Message from Medicare  Important Message from Medicare regarding Discharge Appeal Rights: Given to patient/caregiver, Explained to patient/caregiver, Signed/date by patient/caregiver     Date IMM was signed: 09/30/22  Time IMM was signed: 1400    Contact Info       Kodi Tubbs MD   Specialty: Internal Medicine   Relationship: PCP - General    [address]  [address]  [facility] [address]   Phone: [phone]       Next Steps: Follow up on 10/3/2022    Instructions: Established pt's hospital f/u visit @ 11:00am. Please bring discharge summary, ID, insurance card, and medication list.              Lashell Manning RN  Ext [phone]

## 2022-10-01 LAB — POCT GLUCOSE: 101 MG/DL (ref 70–110)

## 2022-10-03 ENCOUNTER — PATIENT OUTREACH (OUTPATIENT)
Dept: ADMINISTRATIVE | Facility: CLINIC | Age: 72
End: 2022-10-03
Payer: MEDICARE

## 2022-10-03 NOTE — ANESTHESIA POSTPROCEDURE EVALUATION
Anesthesia Post Evaluation    Patient: Fareed Richard Jr.    Procedure(s) Performed: Procedure(s) (LRB):  EGD (ESOPHAGOGASTRODUODENOSCOPY) (N/A)  COLONOSCOPY (N/A)    Final Anesthesia Type: general      Patient location during evaluation: PACU  Patient participation: Yes- Able to Participate  Level of consciousness: awake and alert  Post-procedure vital signs: reviewed and stable  Pain management: adequate  Airway patency: patent    PONV status at discharge: No PONV  Anesthetic complications: no      Cardiovascular status: hemodynamically stable  Respiratory status: spontaneous ventilation  Follow-up not needed.          Vitals Value Taken Time   /71 09/30/22 1310   Temp 36.2 °C (97.1 °F) 09/30/22 1253   Pulse 75 09/30/22 1443   Resp 22 09/30/22 1340   SpO2 89 % 09/30/22 1443   Vitals shown include unvalidated device data.      Event Time   Out of Recovery 12:40:00         Pain/Ирина Score: No data recorded

## 2022-10-03 NOTE — PROGRESS NOTES
C3 nurse spoke with Fareed Richard Jr.'s wife for a TCC post hospital discharge follow up call. The patient has a scheduled HOS appointment with Kodi Tubbs MD  on 10/5/22 @ 1300.

## 2022-10-03 NOTE — PHYSICIAN QUERY
PT Name: Fareed Richard Jr.  MR #: [id number]    DOCUMENTATION CLARIFICATION     CDS/: Tanisha Sanders  RN CCDS           Contact information:[email or web address]    This form is a permanent document in the medical record.     Query Date: October 2, 2022    By submitting this query, we are merely seeking further clarification of documentation. Please utilize your independent clinical judgment when addressing the question(s) below.    Supporting Clinical Findings Location in Medical Record     Chart review showed multiple respiratory cultures with Pseudomonas, unclear if colonization.    Mr. Richard was admitted to Hospital Medicine for management of acute on chronic respiratory failure 2/2 tracheitis/pneumonia, as well as a GIB. [facility] medicine PN 9/27        Hospital medicine PN 9/27    He was started on Zosyn and Tobramycin nebs for pneumonia/tracheitis.  Resp cx returned with pan sensitive Pseudomonas.  ID was consulted.  He was changed from Zosyn to Levaquin given the high AMRITA     Hospital medicine PN 9/27       Provider, please clarify if there is any clinical correlation between __Pseudomonas________ and ____Pneumonia______________.           Are the conditions:      [x  ] Due to or associated with each other   [  ] Unrelated to each other   [  ] Other explanation (Please Specify): ______________   [  ] Clinically Undetermined                                                                               Please document in your progress notes daily for the duration of treatment until resolved and include in your discharge summary.

## 2022-10-03 NOTE — TELEPHONE ENCOUNTER
Tylenol severe sinus OTC PRN  Prednisone 20 ml once daily for 7 days, then 10 ml once daily for 7 days.

## 2022-10-03 NOTE — PHYSICIAN QUERY
PT Name: Fareed Richard Jr.  MR #: [id number]     DOCUMENTATION CLARIFICATION     CDS/: Tanisha Sanders   RN CCDS            Contact information:[email or web address]  This form is a permanent document in the medical record.     Query Date: October 2, 2022    By submitting this query, we are merely seeking further clarification of documentation.  Please utilize your independent clinical judgment when addressing the question(s) below.    The Medical Record contains the following   Indicators Supporting Clinical Findings Location in Medical Record    Heart Failure documented     x BNP Elevated brain natriuretic peptide (BNP) level           BNP newly elevated at 468           2D echo with EF 55% and G1DD           Lasix x 2 to help breathing Hospital medicine PN 9/30   x EF/Echo The left ventricle is normal in size with concentric remodeling and normal systolic function. The estimated ejection fraction is 55%.  Normal right ventricular size with normal right ventricular systolic function.  Grade I left ventricular diastolic dysfunction.  Mild left atrial enlargement.  The aortic root is mildly dilated.  The estimated PA systolic pressure is 34 mmHg.  Normal central venous pressure (3 mmHg). Echo 9/24   x Radiology findings Tracheostomy cannula and monitoring leads noted.Heart is enlarged.Increase in the pulmonary vascular and interstitial markings.Bibasilar effusions again noted.Some patchy increased markings in the right mid lung field though no confluent  consolidation. CXR 9/23   x Subjective/Objective Respiratory Conditions Mr. Richard was admitted to Hospital Medicine for management of acute on chronic respiratory failure 2/2 tracheitis/pneumonia, as well as a GIB.  H&P- Hospital medicine PN 9/29    Recent/Current MI      Heart Transplant, LVAD      Edema, JVD      Ascites     x Diuretics/Meds Lasix 40 mg IV 9/27  Lasix 40 mg IV 9/29 MAR    Other Treatment     x Other Will give Lasix IV to improve respiratory  status   Approximately 3 liters net output this admission [facility] medicine PN 9/29  Nursing flow sheets     Heart failure is a clinical diagnosis which includes symptomatic fluid retention, elevated intracardiac pressures, and/or the inability of the heart to deliver adequate blood flow.    Heart Failure with reduced Ejection Fraction (HFrEF) or Systolic Heart Failure (loses ability to contract normally, EF is <40%)    Heart Failure with preserved Ejection Fraction (HFpEF) or Diastolic Heart Failure (stiff ventricles, does not relax properly, EF is >50%)     Heart Failure with Combined Systolic and Diastolic Failure (stiff ventricles, does not relax properly and EF is <50%)    Mid-range or mildly reduced ejection fraction (HFmrEF) is classified as systolic heart failure.  Congestive heart failure with a recovered EF is classified as Diastolic Heart Failure.  Common clues to acute exacerbation:  Rapidly progressive symptoms (w/in 2 weeks of presentation), using IV diuretics, using supplemental O2, pulmonary edema on Xray, new or worsening pleural effusion, +JVD or other signs of volume overload, MI w/in 4 weeks, and/or BNP >500  The clinical guidelines noted are only system guidelines, and do not replace the providers clinical judgment.    Provider, please specify the diagnosis associated with the above clinical findings.    [   x]  Acute Diastolic Heart Failure (HFpEF) - new diagnosis   [   ]  Acute on Chronic Diastolic Heart Failure (HFpEF) - worsening of CHF signs/symptoms in preexisting CHF   [   ]  Chronic Diastolic Heart Failure (HFpEF) - preexisting and stable   [   ]  Elevated BNP only - no CHF   [   ]  Other (please specify): ___________________________________   [  ]  Clinically Undetermined           Please document in your progress notes daily for the duration of treatment until resolved and include in your discharge summary.    References:  American Heart Association editorial staff. (2017, May).  Ejection Fraction Heart Failure Measurement. American Heart Association. https://www.heart.org/en/health-topics/heart-failure/diagnosing-heart-failure/ejection-fraction-heart-failure-measurement#:~:text=Ejection%20fraction%20(EF)%20is%20a,pushed%20out%20with%20each%20heartbeat  ARNOLD Yang (2020, December 15). Heart failure with preserved ejection fraction: Clinical manifestations and diagnosis. [employer]. https://www.[place].[employer]/contents/heart-failure-with-preserved-ejection-fraction-clinical-manifestations-and-diagnosis.  ICD-10-CM/PCS Coding Clinic Third Quarter ICD-10, Effective with discharges: September 8, 2020 [facility] § Heart failure with mid-range or mildly reduced ejection fraction (2020).  ICD-10-CM/PCS Coding Clinic Third Quarter ICD-10, Effective with discharges: September 8, 2020 [facility] § Heart failure with recovered ejection fraction (2020).  Form No. [id number]

## 2022-10-03 NOTE — PROGRESS NOTES
C3 nurse attempted to contact Fareed Richard Jr.  for a TCC post hospital discharge follow up call. No answer. Left voicemail with callback information. The patient does not have a scheduled HOSFU appointment. Message sent to PCP staff for assistance with scheduling visit with patient.

## 2022-10-04 NOTE — PHYSICIAN QUERY
PT Name: Fareed Richard Jr.  MR #: [id number]     Documentation Clarification      CDS/: Tanisha Sanders   RN CCDS            Contact information:[email or web address]    This form is a permanent document in the medical record.     Query Date: October 4, 2022    By submitting this query, we are merely seeking further clarification of documentation. Please utilize your independent clinical judgment when addressing the question(s) below.    The Medical Record reflects the following:    Supporting Clinical Findings Location in Medical Record   Mr. Richard was admitted to Hospital Medicine for management of acute on chronic respiratory failure 2/2 tracheitis/pneumonia, as well as a [facility] medicine PN 9/24-Discharge summary   Acute tracheitis           Recently with hemoptysis and now yellow/green and foul smelling secretions from his trach and rhonchi - recent CT chest 9/14 with pneumonia           Trach culture with Pseudomonas - Previous resp cx with Pseudomonas, unclear if colonization           Started on Zosyn 9/23, changed to Levaquin by ID 9/24 because of high AMRITA.  Plan for 7 days (end date 10/1)           Continue Tobra nebs    Acute on chronic respiratory failure      On 5L trach collar at home, but requiring 10L trach collar on admit, down to home 5L           Likely 2/2 tracheitis and pneumonia           Continue Prednisone taper from outpatient - 20mg daily x 7 days, then 10mg x  7 days    Tracheostomy present           On 5L trach collar at home    He has a PMH significant for squamous cell carcinoma of the tongue (status  post total glossectomy and bilateral neck dissection with bilateral cervical facial advancement flaps with tracheostomy and PEG tube placement in 01/2022    His wife reports symptoms preceded by episode of bloody secretions from tracheostomy site on 09/14 H&P- Hospital medicine PN 9/30                      H&P- Hospital medicine PN 9/30                    GI CN 9/24                                                                             Provider, please clarify if there is any clinical correlation between ____Tracheostomy___________ and _____Pneumonia/Tracheitis____________.    [  x ] Pneumonia and Tracheitis are due to/related to the Tracheostomy     [   ] Pneumonia is due to/related to the Tracheostomy     [   ] Tracheitis is due to/related to the Tracheostomy     [    ] Tracheostomy not related to either     [   ] Other (please specify): ____________     [  ] Clinically undetermined

## 2022-10-10 ENCOUNTER — TELEPHONE (OUTPATIENT)
Dept: PULMONOLOGY | Facility: CLINIC | Age: 72
End: 2022-10-10
Payer: MEDICARE

## 2022-10-10 DIAGNOSIS — J44.9 CHRONIC OBSTRUCTIVE PULMONARY DISEASE, UNSPECIFIED COPD TYPE: Primary | ICD-10-CM

## 2022-10-12 ENCOUNTER — HOSPITAL ENCOUNTER (OUTPATIENT)
Dept: PULMONOLOGY | Facility: CLINIC | Age: 72
Discharge: HOME | End: 2022-10-12
Payer: MEDICARE

## 2022-10-12 ENCOUNTER — OFFICE VISIT (OUTPATIENT)
Dept: PULMONOLOGY | Facility: CLINIC | Age: 72
End: 2022-10-12
Payer: MEDICARE

## 2022-10-12 VITALS
WEIGHT: 132.06 LBS | DIASTOLIC BLOOD PRESSURE: 74 MMHG | BODY MASS INDEX: 23.4 KG/M2 | OXYGEN SATURATION: 99 % | HEIGHT: 63 IN | HEART RATE: 85 BPM | SYSTOLIC BLOOD PRESSURE: 130 MMHG

## 2022-10-12 DIAGNOSIS — J96.11 CHRONIC HYPOXEMIC RESPIRATORY FAILURE: ICD-10-CM

## 2022-10-12 DIAGNOSIS — J44.9 CHRONIC OBSTRUCTIVE PULMONARY DISEASE, UNSPECIFIED COPD TYPE: ICD-10-CM

## 2022-10-12 DIAGNOSIS — Z93.0 TRACHEOSTOMY IN PLACE: ICD-10-CM

## 2022-10-12 DIAGNOSIS — R93.89 ABNORMAL CHEST CT: ICD-10-CM

## 2022-10-12 DIAGNOSIS — J43.9 PULMONARY EMPHYSEMA, UNSPECIFIED EMPHYSEMA TYPE: Primary | ICD-10-CM

## 2022-10-12 PROCEDURE — 3008F PR BODY MASS INDEX (BMI) DOCUMENTED: ICD-10-PCS | Mod: CPTII,S$GLB,, | Performed by: INTERNAL MEDICINE

## 2022-10-12 PROCEDURE — 99999 PR PBB SHADOW E&M-EST. PATIENT-LVL IV: CPT | Mod: PBBFAC,,, | Performed by: INTERNAL MEDICINE

## 2022-10-12 PROCEDURE — 3008F BODY MASS INDEX DOCD: CPT | Mod: CPTII,S$GLB,, | Performed by: INTERNAL MEDICINE

## 2022-10-12 PROCEDURE — 3075F SYST BP GE 130 - 139MM HG: CPT | Mod: CPTII,S$GLB,, | Performed by: INTERNAL MEDICINE

## 2022-10-12 PROCEDURE — 99215 OFFICE O/P EST HI 40 MIN: CPT | Mod: S$GLB,,, | Performed by: INTERNAL MEDICINE

## 2022-10-12 PROCEDURE — 1159F PR MEDICATION LIST DOCUMENTED IN MEDICAL RECORD: ICD-10-PCS | Mod: CPTII,S$GLB,, | Performed by: INTERNAL MEDICINE

## 2022-10-12 PROCEDURE — 3044F PR MOST RECENT HEMOGLOBIN A1C LEVEL <7.0%: ICD-10-PCS | Mod: CPTII,S$GLB,, | Performed by: INTERNAL MEDICINE

## 2022-10-12 PROCEDURE — 3078F PR MOST RECENT DIASTOLIC BLOOD PRESSURE < 80 MM HG: ICD-10-PCS | Mod: CPTII,S$GLB,, | Performed by: INTERNAL MEDICINE

## 2022-10-12 PROCEDURE — 1159F MED LIST DOCD IN RCRD: CPT | Mod: CPTII,S$GLB,, | Performed by: INTERNAL MEDICINE

## 2022-10-12 PROCEDURE — 4010F PR ACE/ARB THEARPY RXD/TAKEN: ICD-10-PCS | Mod: CPTII,S$GLB,, | Performed by: INTERNAL MEDICINE

## 2022-10-12 PROCEDURE — 99215 PR OFFICE/OUTPT VISIT, EST, LEVL V, 40-54 MIN: ICD-10-PCS | Mod: S$GLB,,, | Performed by: INTERNAL MEDICINE

## 2022-10-12 PROCEDURE — 3078F DIAST BP <80 MM HG: CPT | Mod: CPTII,S$GLB,, | Performed by: INTERNAL MEDICINE

## 2022-10-12 PROCEDURE — 1126F AMNT PAIN NOTED NONE PRSNT: CPT | Mod: CPTII,S$GLB,, | Performed by: INTERNAL MEDICINE

## 2022-10-12 PROCEDURE — 1111F DSCHRG MED/CURRENT MED MERGE: CPT | Mod: CPTII,S$GLB,, | Performed by: INTERNAL MEDICINE

## 2022-10-12 PROCEDURE — 99999 PR PBB SHADOW E&M-EST. PATIENT-LVL IV: ICD-10-PCS | Mod: PBBFAC,,, | Performed by: INTERNAL MEDICINE

## 2022-10-12 PROCEDURE — 1101F PR PT FALLS ASSESS DOC 0-1 FALLS W/OUT INJ PAST YR: ICD-10-PCS | Mod: CPTII,S$GLB,, | Performed by: INTERNAL MEDICINE

## 2022-10-12 PROCEDURE — 3288F FALL RISK ASSESSMENT DOCD: CPT | Mod: CPTII,S$GLB,, | Performed by: INTERNAL MEDICINE

## 2022-10-12 PROCEDURE — 3044F HG A1C LEVEL LT 7.0%: CPT | Mod: CPTII,S$GLB,, | Performed by: INTERNAL MEDICINE

## 2022-10-12 PROCEDURE — 1101F PT FALLS ASSESS-DOCD LE1/YR: CPT | Mod: CPTII,S$GLB,, | Performed by: INTERNAL MEDICINE

## 2022-10-12 PROCEDURE — 1126F PR PAIN SEVERITY QUANTIFIED, NO PAIN PRESENT: ICD-10-PCS | Mod: CPTII,S$GLB,, | Performed by: INTERNAL MEDICINE

## 2022-10-12 PROCEDURE — 4010F ACE/ARB THERAPY RXD/TAKEN: CPT | Mod: CPTII,S$GLB,, | Performed by: INTERNAL MEDICINE

## 2022-10-12 PROCEDURE — 1111F PR DISCHARGE MEDS RECONCILED W/ CURRENT OUTPATIENT MED LIST: ICD-10-PCS | Mod: CPTII,S$GLB,, | Performed by: INTERNAL MEDICINE

## 2022-10-12 PROCEDURE — 3075F PR MOST RECENT SYSTOLIC BLOOD PRESS GE 130-139MM HG: ICD-10-PCS | Mod: CPTII,S$GLB,, | Performed by: INTERNAL MEDICINE

## 2022-10-12 PROCEDURE — 3288F PR FALLS RISK ASSESSMENT DOCUMENTED: ICD-10-PCS | Mod: CPTII,S$GLB,, | Performed by: INTERNAL MEDICINE

## 2022-10-12 RX ORDER — SODIUM CHLORIDE FOR INHALATION 3 %
4 VIAL, NEBULIZER (ML) INHALATION 2 TIMES DAILY
Qty: 720 ML | Refills: 3 | Status: ON HOLD | OUTPATIENT
Start: 2022-10-12 | End: 2024-01-24

## 2022-10-12 NOTE — PROGRESS NOTES
History & Physical  [facility]    SUBJECTIVE:     Chief Complaint:   Chronic hypoxemic respiratory failure    History of Present Illness:  Fareed Richard Jr. is a 72 y.o. male who presents for evaluation of chronic hypoxemic respiratory failure. He coughs daily, particularly in the evening. The cough is productive of yellow mucus which he expectorates without difficult via his trach. He did not experience chronic cough prior to his trach. He experiences chronic dyspnea on exertion. He takes wixela inhaler 1 puff BID (still inhales through his mouth) & an albuterol nebulizer. He has been taking prednisone for the past six weeks (for copd?). He takes glycopyrrolate due to constant oral secretions.     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 S/p tracheostomy, neck dissection, & total glossectomy January 2022.    HeSince his last visit he was hospitalized for acute respiratory failure requiring intubation and had an extended hospital course; admitted 4/19/22 and discharged 5/12/22.  3 month post-treatment PET CT reviewed; no evidence of local residual or recurrent disease.  Pulmonary findings likely reflect sequelae of recent hospitalization.  Concern for active inflammation going on; agree with starting steroids and getting PFTs.  Will need repeat CT chest in 8 weeks and follow up with pulmonology.    PMH: CAD status post RCA PCI in 2000, carotid stenosis status post carotid enterectomy in 2018, PAD status post bilateral femoral atherectomy and hypertension.    He is a former 2PPD smoker; he quit six years ago.    He is a retired  in oil field construction & a [place] .    Review of patient's allergies indicates:  No Known Allergies    Past Medical History:   Diagnosis Date    Cancer     skin to Rt forearm and face    COPD (chronic obstructive pulmonary disease)     Hyperlipidemia     Hypertension     Pseudoaneurysm      Past Surgical History:    Procedure Laterality Date    ABDOMINAL SURGERY      stents placed in liver and large intestines, per patient    CAROTID STENT      COLONOSCOPY N/A 9/27/2022    Procedure: COLONOSCOPY;  Surgeon: Donnie Peterson MD;  Location: [facility] ENDO (2ND FLR);  Service: Endoscopy;  Laterality: N/A;    COLONOSCOPY N/A 9/30/2022    Procedure: COLONOSCOPY;  Surgeon: Joy Cabrera MD;  Location: [facility] ENDO (2ND FLR);  Service: Endoscopy;  Laterality: N/A;    CORONARY STENT PLACEMENT  01/2000    DISSECTION OF NECK Bilateral 1/4/2022    Procedure: DISSECTION, NECK;  Surgeon: Naeem Smalls MD;  [facility];  Service: ENT;  Laterality: Bilateral;    ESOPHAGOGASTRODUODENOSCOPY N/A 9/27/2022    Procedure: EGD (ESOPHAGOGASTRODUODENOSCOPY);  Surgeon: Donnie Peterson MD;  Location: [facility] ENDO (2ND FLR);  Service: Endoscopy;  Laterality: N/A;    ESOPHAGOGASTRODUODENOSCOPY N/A 9/30/2022    Procedure: EGD (ESOPHAGOGASTRODUODENOSCOPY);  Surgeon: Joy Cabrera MD;  Location: [facility] ENDO (2ND FLR);  Service: Endoscopy;  Laterality: N/A;    ESOPHAGOGASTRODUODENOSCOPY W/ PEG N/A 1/4/2022    Procedure: EGD, WITH PEG TUBE INSERTION;  Surgeon: Anthony Calabrese MD;  [facility];  Service: General;  Laterality: N/A;    EYE SURGERY      Cataract bilateral    femoral stents      bilateral    FLAP PROCEDURE N/A 1/3/2022    Procedure: CREATION, FREE FLAP;  Surgeon: Naeem Smalls MD;  [facility];  Service: ENT;  Laterality: N/A;    FLAP PROCEDURE Right 1/4/2022    Procedure: CREATION, FREE FLAP;  Surgeon: Stacey Conde MD;  [facility];  Service: ENT;  Laterality: Right;  Ischemic start 1203  Ischemic stop 1353    GLOSSECTOMY N/A 1/4/2022    Procedure: GLOSSECTOMY;  Surgeon: Naeem Smalls MD;  [facility];  Service: ENT;  Laterality: N/A;    RADICAL NECK DISSECTION Left 1/3/2022    Procedure: DISSECTION, NECK, RADICAL;  Surgeon: Naeem Smalls MD;  Location: [facility] OR  "2ND FLR;  Service: ENT;  Laterality: Left;    SKIN CANCER EXCISION      TRACHEOTOMY N/A 2022    Procedure: TRACHEOTOMY;  Surgeon: Naeem Smalls MD;  Location: [facility] OR 2ND FLR;  Service: ENT;  Laterality: N/A;     No family history on file.  Social History     Socioeconomic History    Marital status:    Tobacco Use    Smoking status: Former     Packs/day: 2.00     Years: 40.00     Pack years: 80.00     Types: Cigarettes     Start date: 1963     Quit date: 2018     Years since quittin.4    Smokeless tobacco: Never    Tobacco comments:     3/3 ppd x 40 yrs. Currently 3-4 cigarettes daily .He is trying  to quit. Is using a Vapor cigarettes  2-3 x's a day.   Substance and Sexual Activity    Alcohol use: Not Currently     Alcohol/week: 3.0 standard drinks     Types: 3 Cans of beer per week     Comment: beer daily 3-4    Drug use: No    Sexual activity: Not Currently     Review of Systems:  Negative other than HPI.    OBJECTIVE:     Vital Signs  Vitals:    10/12/22 0816   BP: 130/74   BP Location: Right arm   Patient Position: Sitting   Pulse: 85   SpO2: 99%  Comment: 4.0 L   Weight: 59.9 kg (132 lb 0.9 oz)   Height: 5' 3" (1.6 m)     Body mass index is 23.39 kg/m².    Physical Exam:  General: no distress  Eyes:  conjunctivae/corneas clear  Nose: no discharge  Neck: 6 uncuffed Shiley tracheostomy in place  Lungs:  normal respiratory effort, no wheezes, no rales  Heart: regular rate and rhythm and no murmur  Abdomen: non-distended  Extremities: no cyanosis, no edema, no clubbing  Skin: No rashes or lesions. good skin turgor  Neurologic: alert, oriented, thought content appropriate    Laboratory:  Lab Results   Component Value Date    WBC 5.15 2022    HGB 8.4 (L) 2022    HCT 28.0 (L) 2022    MCV 84 2022     2022       Chest Imaging, My Impression:   CT chest 2022: extensive emphysematous changes, +left pleural effusion, multiple areas of focal opacity " adjacent to the pleura, +mediastinal lymphadenopathy, extensive atherosclerosis, diffuse reticulonodular infiltrates most prominent at the bases (interlobular septal thickening with superimposed nodules)    ASSESSMENT/PLAN:     1) Emphysema lung. Continue wixela. Continue prn nebulizer.  2) Chronic hypoxemic respiratory failure. Goal O2 level is 88% or above at rest. Pt was 99% while wearing 4L today. He does not need his O2 this high. I provided education & written instructions how to self-titrate his oxygen.  3) Abnormal chest CT. See description above. These are chronic findings that appear to have appeared & worsened over the past year. I suspect a combination of inflammation/infection/scarring secondary to impaired pulmonary hygiene. I suspect this based on the following clinical factors:  - New & chronic cough productive of sputum.  - History of difficulty managing oral secretions requiring glycopyrrolate.  - Numerous sputum cultures positive for pseudomonas.    Some of the observed CT findings can be seen in lymphangitic carcinomatosis, but this seems less likely given lack of strong FDG uptake on PET & no evidence of disease elsewhere. For the time being, I will follow his surveillance imaging as it becomes available. If, for example, pleural effusion worsens or mediastinal adenopathy worsens, then we might consider the pt for thora or ebus, respectively.    4) Impaired pulmonary hygiene & secretion management following glossectomy. I recommend starting 3% saline neb via trach in order to help thin his secretions & improve mucus clearance.  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 S/p tracheostomy, neck dissection, & total glossectomy January 2022.  6) S/p tracheostomy.    Total professional time spent for the encounter: 60 minutes  Time was spent preparing to see the patient, reviewing results of prior testing, obtaining and/or reviewing separately obtained history, performing  a medically appropriate examination and interview, counseling and educating the patient/family, ordering medications/tests/procedures, referring and communicating with other health care professionals, documenting clinical information in the electronic health record, and independently interpreting results.      Salvador [place], MD  [facility]

## 2022-10-19 ENCOUNTER — HOSPITAL ENCOUNTER (OUTPATIENT)
Dept: RADIOLOGY | Facility: HOSPITAL | Age: 72
Discharge: HOME | End: 2022-10-19
Attending: STUDENT IN AN ORGANIZED HEALTH CARE EDUCATION/TRAINING PROGRAM
Payer: MEDICARE

## 2022-10-19 DIAGNOSIS — R91.8 PULMONARY INFILTRATE: ICD-10-CM

## 2022-10-19 PROCEDURE — 71250 CT THORAX DX C-: CPT | Mod: 26,,, | Performed by: RADIOLOGY

## 2022-10-19 PROCEDURE — 71250 CT THORAX DX C-: CPT | Mod: TC

## 2022-10-19 PROCEDURE — 71250 CT CHEST WITHOUT CONTRAST: ICD-10-PCS | Mod: 26,,, | Performed by: RADIOLOGY

## 2022-10-25 ENCOUNTER — OFFICE VISIT (OUTPATIENT)
Dept: OTOLARYNGOLOGY | Facility: CLINIC | Age: 72
End: 2022-10-25
Payer: MEDICARE

## 2022-10-25 VITALS
DIASTOLIC BLOOD PRESSURE: 59 MMHG | BODY MASS INDEX: 24.13 KG/M2 | SYSTOLIC BLOOD PRESSURE: 94 MMHG | WEIGHT: 136.25 LBS | HEART RATE: 69 BPM

## 2022-10-25 DIAGNOSIS — C02.9 SQUAMOUS CELL CANCER OF TONGUE: Primary | ICD-10-CM

## 2022-10-25 PROCEDURE — 1101F PT FALLS ASSESS-DOCD LE1/YR: CPT | Mod: CPTII,S$GLB,, | Performed by: OTOLARYNGOLOGY

## 2022-10-25 PROCEDURE — 3044F HG A1C LEVEL LT 7.0%: CPT | Mod: CPTII,S$GLB,, | Performed by: OTOLARYNGOLOGY

## 2022-10-25 PROCEDURE — 1111F PR DISCHARGE MEDS RECONCILED W/ CURRENT OUTPATIENT MED LIST: ICD-10-PCS | Mod: CPTII,S$GLB,, | Performed by: OTOLARYNGOLOGY

## 2022-10-25 PROCEDURE — 4010F ACE/ARB THERAPY RXD/TAKEN: CPT | Mod: CPTII,S$GLB,, | Performed by: OTOLARYNGOLOGY

## 2022-10-25 PROCEDURE — 3078F PR MOST RECENT DIASTOLIC BLOOD PRESSURE < 80 MM HG: ICD-10-PCS | Mod: CPTII,S$GLB,, | Performed by: OTOLARYNGOLOGY

## 2022-10-25 PROCEDURE — 1101F PR PT FALLS ASSESS DOC 0-1 FALLS W/OUT INJ PAST YR: ICD-10-PCS | Mod: CPTII,S$GLB,, | Performed by: OTOLARYNGOLOGY

## 2022-10-25 PROCEDURE — 3074F PR MOST RECENT SYSTOLIC BLOOD PRESSURE < 130 MM HG: ICD-10-PCS | Mod: CPTII,S$GLB,, | Performed by: OTOLARYNGOLOGY

## 2022-10-25 PROCEDURE — 1160F PR REVIEW ALL MEDS BY PRESCRIBER/CLIN PHARMACIST DOCUMENTED: ICD-10-PCS | Mod: CPTII,S$GLB,, | Performed by: OTOLARYNGOLOGY

## 2022-10-25 PROCEDURE — 3074F SYST BP LT 130 MM HG: CPT | Mod: CPTII,S$GLB,, | Performed by: OTOLARYNGOLOGY

## 2022-10-25 PROCEDURE — 4010F PR ACE/ARB THEARPY RXD/TAKEN: ICD-10-PCS | Mod: CPTII,S$GLB,, | Performed by: OTOLARYNGOLOGY

## 2022-10-25 PROCEDURE — 1159F PR MEDICATION LIST DOCUMENTED IN MEDICAL RECORD: ICD-10-PCS | Mod: CPTII,S$GLB,, | Performed by: OTOLARYNGOLOGY

## 2022-10-25 PROCEDURE — 1159F MED LIST DOCD IN RCRD: CPT | Mod: CPTII,S$GLB,, | Performed by: OTOLARYNGOLOGY

## 2022-10-25 PROCEDURE — 1126F AMNT PAIN NOTED NONE PRSNT: CPT | Mod: CPTII,S$GLB,, | Performed by: OTOLARYNGOLOGY

## 2022-10-25 PROCEDURE — 3288F PR FALLS RISK ASSESSMENT DOCUMENTED: ICD-10-PCS | Mod: CPTII,S$GLB,, | Performed by: OTOLARYNGOLOGY

## 2022-10-25 PROCEDURE — 3044F PR MOST RECENT HEMOGLOBIN A1C LEVEL <7.0%: ICD-10-PCS | Mod: CPTII,S$GLB,, | Performed by: OTOLARYNGOLOGY

## 2022-10-25 PROCEDURE — 3288F FALL RISK ASSESSMENT DOCD: CPT | Mod: CPTII,S$GLB,, | Performed by: OTOLARYNGOLOGY

## 2022-10-25 PROCEDURE — 99213 OFFICE O/P EST LOW 20 MIN: CPT | Mod: S$GLB,,, | Performed by: OTOLARYNGOLOGY

## 2022-10-25 PROCEDURE — 3078F DIAST BP <80 MM HG: CPT | Mod: CPTII,S$GLB,, | Performed by: OTOLARYNGOLOGY

## 2022-10-25 PROCEDURE — 99213 PR OFFICE/OUTPT VISIT, EST, LEVL III, 20-29 MIN: ICD-10-PCS | Mod: S$GLB,,, | Performed by: OTOLARYNGOLOGY

## 2022-10-25 PROCEDURE — 99999 PR PBB SHADOW E&M-EST. PATIENT-LVL III: ICD-10-PCS | Mod: PBBFAC,,, | Performed by: OTOLARYNGOLOGY

## 2022-10-25 PROCEDURE — 1126F PR PAIN SEVERITY QUANTIFIED, NO PAIN PRESENT: ICD-10-PCS | Mod: CPTII,S$GLB,, | Performed by: OTOLARYNGOLOGY

## 2022-10-25 PROCEDURE — 99999 PR PBB SHADOW E&M-EST. PATIENT-LVL III: CPT | Mod: PBBFAC,,, | Performed by: OTOLARYNGOLOGY

## 2022-10-25 PROCEDURE — 1160F RVW MEDS BY RX/DR IN RCRD: CPT | Mod: CPTII,S$GLB,, | Performed by: OTOLARYNGOLOGY

## 2022-10-25 PROCEDURE — 1111F DSCHRG MED/CURRENT MED MERGE: CPT | Mod: CPTII,S$GLB,, | Performed by: OTOLARYNGOLOGY

## 2022-10-25 NOTE — PROGRESS NOTES
Chief Complaint   Patient presents with    Follow-up     Oncology History   Squamous cell cancer of tongue   12/16/2021 Initial Diagnosis    Squamous cell cancer of tongue     12/16/2021 Tumor Conference       His case was discussed at the Multidisciplinary Head and Neck Team Planning Meeting.    Representatives from Medical Oncology, Radiation Oncology, Head and Neck Surgical Oncology, Psychosocial Oncology, and Speech and Language Pathology discussed the case with the following recommendations:    1) surgery  2) work up lung nodule post op         1/2/2022 Cancer Staged    Staging form: Oral Cavity, AJCC 8th Edition  - Clinical stage from 1/2/2022: Stage NAFISA (cT2, cN2a, cM0)       1/4/2022 Cancer Staged    Staging form: Oral Cavity, AJCC 8th Edition  - Pathologic stage from 1/4/2022: Stage IVB (pT4a, pN3b, cM0)       2/28/2022 - 4/6/2022 Chemotherapy    Treatment Summary   Plan Name: OP HEAD NECK CISPLATIN WEEKLY + RADIOTHERAPY  Treatment Goal: Curative  Status: Inactive  Start Date: 2/28/2022  End Date: 4/6/2022  Provider: Christopher Jay MD  Chemotherapy: CISplatin (PLATINOL) 40 mg/m2 = 69 mg in sodium chloride 0.9% 500 mL chemo infusion, 40 mg/m2 = 69 mg, Intravenous, Clinic/HOD 1 time, 6 of 7 cycles  Administration: 69 mg (2/28/2022), 66 mg (3/7/2022), 69 mg (3/16/2022), 69 mg (3/23/2022), 69 mg (3/30/2022), 70 mg (4/6/2022)        Radiation Therapy    Treatment Summary  Course: C1 HN 2022  Treatment Site Ref. ID Energy Dose/Fx (Gy) #Fx Dose Correction (Gy) Total Dose (Gy) Start Date End Date Elapsed Days   IM H&N:1 PTV_66 6X 2 7 / 7 0 14 2/28/2022 3/10/2022 10   IM H&N2 PTV_66 6X 2 26 / 26 0 51.903 3/11/2022 4/22/2022 42      Secondary malignant neoplasm of cervical lymph node   2/16/2022 Initial Diagnosis    Secondary malignant neoplasm of cervical lymph node     2/28/2022 - 4/6/2022 Chemotherapy    Treatment Summary   Plan Name: OP HEAD NECK CISPLATIN WEEKLY + RADIOTHERAPY  Treatment Goal: Curative  Status:  Inactive  Start Date: 2/28/2022  End Date: 4/6/2022  Provider: Christopher Jay MD  Chemotherapy: CISplatin (PLATINOL) 40 mg/m2 = 69 mg in sodium chloride 0.9% 500 mL chemo infusion, 40 mg/m2 = 69 mg, Intravenous, Clinic/HOD 1 time, 6 of 7 cycles  Administration: 69 mg (2/28/2022), 66 mg (3/7/2022), 69 mg (3/16/2022), 69 mg (3/23/2022), 69 mg (3/30/2022), 70 mg (4/6/2022)             HPI   72 y.o. male presents with the above treatment history.  No new complaints.     Review of Systems   Constitutional: Negative for fatigue and unexpected weight change.   HENT: Per HPI.  Eyes: Negative for visual disturbance.   Respiratory: Negative for shortness of breath, hemoptysis   Cardiovascular: Negative for chest pain and palpitations.   Musculoskeletal: Negative for decreased ROM, back pain.   Skin: Negative for rash, sunburn, itching.   Neurological: Negative for dizziness and seizures.   Hematological: Negative for adenopathy. Does not bruise/bleed easily.   Endocrine: Negative for rapid weight loss/weight gain, heat/cold intolerance.     Past Medical History   Patient Active Problem List   Diagnosis    Peripheral vascular disease    Carotid stenosis    Squamous cell cancer of tongue    Coronary artery disease involving native coronary artery of native heart without angina pectoris    Primary hypertension    Other hyperlipidemia    Impaired mobility and ADLs    Tracheobronchitis    Toxic effect of tobacco cigarette, intentional self-harm    COPD exacerbation    Acute blood loss anemia    Secondary malignant neoplasm of cervical lymph node    Protein-calorie malnutrition    Chronic respiratory failure with hypoxia, on home O2 therapy    Acute on chronic respiratory failure    Dysphagia    Aspiration pneumonia    Goals of care, counseling/discussion    Pulmonary infiltrate    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     Bloody sputum    Tracheostomy present    PEG (percutaneous endoscopic gastrostomy)  status    Elevated brain natriuretic peptide (BNP) level    Acute tracheitis    Gastrointestinal hemorrhage with melena    Pneumonia           Past Surgical History   Past Surgical History:   Procedure Laterality Date    ABDOMINAL SURGERY      stents placed in liver and large intestines, per patient    CAROTID STENT      COLONOSCOPY N/A 9/27/2022    Procedure: COLONOSCOPY;  Surgeon: Donnie Peterson MD;  Location: [facility] ENDO (2ND FLR);  Service: Endoscopy;  Laterality: N/A;    COLONOSCOPY N/A 9/30/2022    Procedure: COLONOSCOPY;  Surgeon: Joy Cabrera MD;  Location: [facility] ENDO (2ND FLR);  Service: Endoscopy;  Laterality: N/A;    CORONARY STENT PLACEMENT  01/2000    DISSECTION OF NECK Bilateral 1/4/2022    Procedure: DISSECTION, NECK;  Surgeon: Naeem Smalls MD;  [facility];  Service: ENT;  Laterality: Bilateral;    ESOPHAGOGASTRODUODENOSCOPY N/A 9/27/2022    Procedure: EGD (ESOPHAGOGASTRODUODENOSCOPY);  Surgeon: Donnie Peterson MD;  Location: [facility] ENDO (2ND FLR);  Service: Endoscopy;  Laterality: N/A;    ESOPHAGOGASTRODUODENOSCOPY N/A 9/30/2022    Procedure: EGD (ESOPHAGOGASTRODUODENOSCOPY);  Surgeon: Joy Cabrera MD;  Location: [facility] ENDO (2ND FLR);  Service: Endoscopy;  Laterality: N/A;    ESOPHAGOGASTRODUODENOSCOPY W/ PEG N/A 1/4/2022    Procedure: EGD, WITH PEG TUBE INSERTION;  Surgeon: Anthony Calabrese MD;  [facility];  Service: General;  Laterality: N/A;    EYE SURGERY      Cataract bilateral    femoral stents      bilateral    FLAP PROCEDURE N/A 1/3/2022    Procedure: CREATION, FREE FLAP;  Surgeon: Naeem Smalls MD;  [facility];  Service: ENT;  Laterality: N/A;    FLAP PROCEDURE Right 1/4/2022    Procedure: CREATION, FREE FLAP;  Surgeon: Stacey Conde MD;  Location: [facility] OR 2ND FLR;  Service: ENT;  Laterality: Right;  Ischemic start 1203  Ischemic stop 1353    GLOSSECTOMY N/A 1/4/2022    Procedure: GLOSSECTOMY;  Surgeon: Naeem Smalls MD;   [facility];  Service: ENT;  Laterality: N/A;    RADICAL NECK DISSECTION Left 1/3/2022    Procedure: DISSECTION, NECK, RADICAL;  Surgeon: Naeem Smalls MD;  [facility];  Service: ENT;  Laterality: Left;    SKIN CANCER EXCISION      TRACHEOTOMY N/A 2022    Procedure: TRACHEOTOMY;  Surgeon: Naeem Smalls MD;  [facility];  Service: ENT;  Laterality: N/A;         Family History   No family history on file.        Social History   .  Social History     Socioeconomic History    Marital status:    Tobacco Use    Smoking status: Former     Packs/day: 2.00     Years: 40.00     Pack years: 80.00     Types: Cigarettes     Start date: 1963     Quit date: 2018     Years since quittin.5    Smokeless tobacco: Never    Tobacco comments:     3/3 ppd x 40 yrs. Currently 3-4 cigarettes daily .He is trying  to quit. Is using a Vapor cigarettes  2-3 x's a day.   Substance and Sexual Activity    Alcohol use: Not Currently     Alcohol/week: 3.0 standard drinks     Types: 3 Cans of beer per week     Comment: beer daily 3-4    Drug use: No    Sexual activity: Not Currently         Allergies   Review of patient's allergies indicates:  No Known Allergies        Physical Exam     Vitals:    10/25/22 1417   BP: (!) 94/59   Pulse: 69           Body mass index is 24.13 kg/m².      General: AOx3, NAD   Respiratory:  Symmetric chest rise, normal effort  Oral cavity/oropharynx:  No worrisome lesions.  Flap well incorporated.  Neck:  Well-healed neck scars.  Size 6 uncuffed Shiley tracheostomy tube in place- exchanged for identical tube.  No LAD.  Face: House Brackmann I bilaterally.     NP: No lesions of posterior wall  OP: No lesions of posterior wall or BOT. BOT is soft to palpation  Larynx: No lesions of glottic or supraglottic larynx. Normal vocal fold mobility   HP: No lesions of pyriform sinuses or postcricoid region  Mirror examination was  performed.      Assessment/Plan  Problem List Items Addressed This Visit          Oncology    Squamous cell cancer of tongue - Primary     HARVEY.  RTC 6 weeks.

## 2022-10-26 ENCOUNTER — OFFICE VISIT (OUTPATIENT)
Dept: GASTROENTEROLOGY | Facility: CLINIC | Age: 72
End: 2022-10-26
Payer: MEDICARE

## 2022-10-26 VITALS
HEART RATE: 60 BPM | HEIGHT: 68 IN | WEIGHT: 136 LBS | SYSTOLIC BLOOD PRESSURE: 99 MMHG | DIASTOLIC BLOOD PRESSURE: 65 MMHG | BODY MASS INDEX: 20.61 KG/M2

## 2022-10-26 DIAGNOSIS — D62 ACUTE BLOOD LOSS ANEMIA: Primary | ICD-10-CM

## 2022-10-26 PROCEDURE — 1159F MED LIST DOCD IN RCRD: CPT | Mod: CPTII,GC,S$GLB, | Performed by: STUDENT IN AN ORGANIZED HEALTH CARE EDUCATION/TRAINING PROGRAM

## 2022-10-26 PROCEDURE — 3078F PR MOST RECENT DIASTOLIC BLOOD PRESSURE < 80 MM HG: ICD-10-PCS | Mod: CPTII,GC,S$GLB, | Performed by: STUDENT IN AN ORGANIZED HEALTH CARE EDUCATION/TRAINING PROGRAM

## 2022-10-26 PROCEDURE — 1111F DSCHRG MED/CURRENT MED MERGE: CPT | Mod: CPTII,GC,S$GLB, | Performed by: STUDENT IN AN ORGANIZED HEALTH CARE EDUCATION/TRAINING PROGRAM

## 2022-10-26 PROCEDURE — 3044F HG A1C LEVEL LT 7.0%: CPT | Mod: CPTII,GC,S$GLB, | Performed by: STUDENT IN AN ORGANIZED HEALTH CARE EDUCATION/TRAINING PROGRAM

## 2022-10-26 PROCEDURE — 1101F PT FALLS ASSESS-DOCD LE1/YR: CPT | Mod: CPTII,GC,S$GLB, | Performed by: STUDENT IN AN ORGANIZED HEALTH CARE EDUCATION/TRAINING PROGRAM

## 2022-10-26 PROCEDURE — 1101F PR PT FALLS ASSESS DOC 0-1 FALLS W/OUT INJ PAST YR: ICD-10-PCS | Mod: CPTII,GC,S$GLB, | Performed by: STUDENT IN AN ORGANIZED HEALTH CARE EDUCATION/TRAINING PROGRAM

## 2022-10-26 PROCEDURE — 99213 OFFICE O/P EST LOW 20 MIN: CPT | Mod: GC,S$GLB,, | Performed by: STUDENT IN AN ORGANIZED HEALTH CARE EDUCATION/TRAINING PROGRAM

## 2022-10-26 PROCEDURE — 99999 PR PBB SHADOW E&M-EST. PATIENT-LVL IV: CPT | Mod: PBBFAC,GC,, | Performed by: STUDENT IN AN ORGANIZED HEALTH CARE EDUCATION/TRAINING PROGRAM

## 2022-10-26 PROCEDURE — 3074F PR MOST RECENT SYSTOLIC BLOOD PRESSURE < 130 MM HG: ICD-10-PCS | Mod: CPTII,GC,S$GLB, | Performed by: STUDENT IN AN ORGANIZED HEALTH CARE EDUCATION/TRAINING PROGRAM

## 2022-10-26 PROCEDURE — 4010F PR ACE/ARB THEARPY RXD/TAKEN: ICD-10-PCS | Mod: CPTII,GC,S$GLB, | Performed by: STUDENT IN AN ORGANIZED HEALTH CARE EDUCATION/TRAINING PROGRAM

## 2022-10-26 PROCEDURE — 1126F PR PAIN SEVERITY QUANTIFIED, NO PAIN PRESENT: ICD-10-PCS | Mod: CPTII,GC,S$GLB, | Performed by: STUDENT IN AN ORGANIZED HEALTH CARE EDUCATION/TRAINING PROGRAM

## 2022-10-26 PROCEDURE — 1111F PR DISCHARGE MEDS RECONCILED W/ CURRENT OUTPATIENT MED LIST: ICD-10-PCS | Mod: CPTII,GC,S$GLB, | Performed by: STUDENT IN AN ORGANIZED HEALTH CARE EDUCATION/TRAINING PROGRAM

## 2022-10-26 PROCEDURE — 1160F PR REVIEW ALL MEDS BY PRESCRIBER/CLIN PHARMACIST DOCUMENTED: ICD-10-PCS | Mod: CPTII,GC,S$GLB, | Performed by: STUDENT IN AN ORGANIZED HEALTH CARE EDUCATION/TRAINING PROGRAM

## 2022-10-26 PROCEDURE — 3078F DIAST BP <80 MM HG: CPT | Mod: CPTII,GC,S$GLB, | Performed by: STUDENT IN AN ORGANIZED HEALTH CARE EDUCATION/TRAINING PROGRAM

## 2022-10-26 PROCEDURE — 1126F AMNT PAIN NOTED NONE PRSNT: CPT | Mod: CPTII,GC,S$GLB, | Performed by: STUDENT IN AN ORGANIZED HEALTH CARE EDUCATION/TRAINING PROGRAM

## 2022-10-26 PROCEDURE — 99999 PR PBB SHADOW E&M-EST. PATIENT-LVL IV: ICD-10-PCS | Mod: PBBFAC,GC,, | Performed by: STUDENT IN AN ORGANIZED HEALTH CARE EDUCATION/TRAINING PROGRAM

## 2022-10-26 PROCEDURE — 3074F SYST BP LT 130 MM HG: CPT | Mod: CPTII,GC,S$GLB, | Performed by: STUDENT IN AN ORGANIZED HEALTH CARE EDUCATION/TRAINING PROGRAM

## 2022-10-26 PROCEDURE — 3288F PR FALLS RISK ASSESSMENT DOCUMENTED: ICD-10-PCS | Mod: CPTII,GC,S$GLB, | Performed by: STUDENT IN AN ORGANIZED HEALTH CARE EDUCATION/TRAINING PROGRAM

## 2022-10-26 PROCEDURE — 4010F ACE/ARB THERAPY RXD/TAKEN: CPT | Mod: CPTII,GC,S$GLB, | Performed by: STUDENT IN AN ORGANIZED HEALTH CARE EDUCATION/TRAINING PROGRAM

## 2022-10-26 PROCEDURE — 99213 PR OFFICE/OUTPT VISIT, EST, LEVL III, 20-29 MIN: ICD-10-PCS | Mod: GC,S$GLB,, | Performed by: STUDENT IN AN ORGANIZED HEALTH CARE EDUCATION/TRAINING PROGRAM

## 2022-10-26 PROCEDURE — 3044F PR MOST RECENT HEMOGLOBIN A1C LEVEL <7.0%: ICD-10-PCS | Mod: CPTII,GC,S$GLB, | Performed by: STUDENT IN AN ORGANIZED HEALTH CARE EDUCATION/TRAINING PROGRAM

## 2022-10-26 PROCEDURE — 1159F PR MEDICATION LIST DOCUMENTED IN MEDICAL RECORD: ICD-10-PCS | Mod: CPTII,GC,S$GLB, | Performed by: STUDENT IN AN ORGANIZED HEALTH CARE EDUCATION/TRAINING PROGRAM

## 2022-10-26 PROCEDURE — 3288F FALL RISK ASSESSMENT DOCD: CPT | Mod: CPTII,GC,S$GLB, | Performed by: STUDENT IN AN ORGANIZED HEALTH CARE EDUCATION/TRAINING PROGRAM

## 2022-10-26 PROCEDURE — 1160F RVW MEDS BY RX/DR IN RCRD: CPT | Mod: CPTII,GC,S$GLB, | Performed by: STUDENT IN AN ORGANIZED HEALTH CARE EDUCATION/TRAINING PROGRAM

## 2022-10-26 NOTE — PROGRESS NOTES
[facility]    Reason for visit: There were no encounter diagnoses.  Referring Provider/PCP: Kodi Tubbs MD    History of Present Illness:  73yo PMH squamous cell carcinoma of the tongue (status post total glossectomy and bilateral neck dissection with bilateral cervical facial advancement flaps with tracheostomy and PEG tube placement in 01/2022 and treatment with chemotherapy and radiation from 02/2022 to 04/2022), CAD (status post PCI in 2000), and PAD (status post bilateral femoral atherectomy and multiple lower extremity interventions last in 2018 and on ASA and PLAVIX).    He presents to GI clinic for hospital follow up.     Patient was hospitalized from 09/24-09/30 for SOB found to be anemic Hgb 5.5 and GI was consulted for melena. EGD/colonoscopy initially pursued 09/27 but aborted due to hypoxia. EGD 09/30 not able for LA Grade B esophagitis, erythematous antrum, normal duodenum. No PEG ulcer. Colonoscopy with poor prep and stool in rectum.     Patient was discharged 09/30 as melena had resolved with stable Hgb 8.4. Plan was for outpatient follow up for consideration of colonoscopy.     He has never had colonoscopy before this hospitalization. No family history of colon cancer. Patient denies any more melenic stools. No NSAIDs. No issues with PEG tube. Currently receiving prilosec 40mg BID through PEG.    PEndoHx:  EGD/colonoscopy initially pursued 09/27 but aborted due to hypoxia. EGD 09/30 not able for LA Grade B esophagitis, erythematous antrum, normal duodenum. Colonoscopy with poor prep and stool in rectum.     Review of Systems   Constitutional:  Negative for chills, fever and weight loss.   HENT:  Negative for ear pain, hearing loss and tinnitus.    Eyes:  Negative for blurred vision, double vision and photophobia.   Respiratory:  Negative for cough, hemoptysis and sputum production.    Cardiovascular:  Negative for chest pain, palpitations and orthopnea.    Gastrointestinal:  Negative for abdominal pain, blood in stool, constipation, diarrhea, heartburn, melena, nausea and vomiting.   Genitourinary:  Negative for dysuria, frequency and urgency.   Musculoskeletal:  Negative for back pain, myalgias and neck pain.   Skin:  Negative for itching and rash.   Neurological:  Negative for dizziness, tingling and headaches.   Endo/Heme/Allergies:  Negative for environmental allergies and polydipsia. Does not bruise/bleed easily.     Medical History:  Past Medical History:   Diagnosis Date    Cancer     skin to Rt forearm and face    COPD (chronic obstructive pulmonary disease)     Hyperlipidemia     Hypertension     Pseudoaneurysm        Past Surgical History:   Procedure Laterality Date    ABDOMINAL SURGERY      stents placed in liver and large intestines, per patient    CAROTID STENT      COLONOSCOPY N/A 9/27/2022    Procedure: COLONOSCOPY;  Surgeon: Donnie Peterson MD;  Location: [facility] ([address] [place] [address]);  Service: Endoscopy;  Laterality: N/A;    COLONOSCOPY N/A 9/30/2022    Procedure: COLONOSCOPY;  Surgeon: Joy Cabrera MD;  Location: [address]);  Service: Endoscopy;  Laterality: N/A;    CORONARY STENT PLACEMENT  01/2000    DISSECTION OF NECK Bilateral 1/4/2022    Procedure: DISSECTION, NECK;  Surgeon: Naeem Smalls MD;  Location: [facility] OR [address] [place] [address];  Service: ENT;  Laterality: Bilateral;    ESOPHAGOGASTRODUODENOSCOPY N/A 9/27/2022    Procedure: EGD (ESOPHAGOGASTRODUODENOSCOPY);  Surgeon: Donnie Peterson MD;  Location: [address]);  Service: Endoscopy;  Laterality: N/A;    ESOPHAGOGASTRODUODENOSCOPY N/A 9/30/2022    Procedure: EGD (ESOPHAGOGASTRODUODENOSCOPY);  Surgeon: Joy Cabrera MD;  Location: [address]);  Service: Endoscopy;  Laterality: N/A;    ESOPHAGOGASTRODUODENOSCOPY W/ PEG N/A 1/4/2022    Procedure: EGD, WITH PEG TUBE INSERTION;  Surgeon: Anthony Calabrese MD;  Location: [facility] OR [address] [place] [address];  Service: General;  Laterality: N/A;    EYE SURGERY       Cataract bilateral    femoral stents      bilateral    FLAP PROCEDURE N/A 1/3/2022    Procedure: CREATION, FREE FLAP;  Surgeon: Naeem Smalls MD;  Location: [facility] OR 2ND FLR;  Service: ENT;  Laterality: N/A;    FLAP PROCEDURE Right 2022    Procedure: CREATION, FREE FLAP;  Surgeon: Stacey Conde MD;  Location: [facility] OR [facility] FLR;  Service: ENT;  Laterality: Right;  Ischemic start 1203  Ischemic stop 1353    GLOSSECTOMY N/A 2022    Procedure: GLOSSECTOMY;  Surgeon: Naeem Smalls MD;  Location: [facility] OR [facility] FLR;  Service: ENT;  Laterality: N/A;    RADICAL NECK DISSECTION Left 1/3/2022    Procedure: DISSECTION, NECK, RADICAL;  Surgeon: Naeem Smalls MD;  [facility];  Service: ENT;  Laterality: Left;    SKIN CANCER EXCISION      TRACHEOTOMY N/A 2022    Procedure: TRACHEOTOMY;  Surgeon: Naeem Smalls MD;  [facility];  Service: ENT;  Laterality: N/A;       No family history on file.    Social History     Socioeconomic History    Marital status:    Tobacco Use    Smoking status: Former     Packs/day: 2.00     Years: 40.00     Pack years: 80.00     Types: Cigarettes     Start date: 1963     Quit date: 2018     Years since quittin.5    Smokeless tobacco: Never    Tobacco comments:     3/3 ppd x 40 yrs. Currently 3-4 cigarettes daily .He is trying  to quit. Is using a Vapor cigarettes  2-3 x's a day.   Substance and Sexual Activity    Alcohol use: Not Currently     Alcohol/week: 3.0 standard drinks     Types: 3 Cans of beer per week     Comment: beer daily 3-4    Drug use: No    Sexual activity: Not Currently       Current Outpatient Medications on File Prior to Visit   Medication Sig Dispense Refill    aspirin 81 MG Chew 1 tablet (81 mg total) by Per G Tube route once daily.  0    atorvastatin (LIPITOR) 20 MG tablet 1 tablet (20 mg total) by Per G Tube route once daily. 90 tablet 3    bisacodyL (DULCOLAX) 10 mg Supp Place 1 suppository  (10 mg total) rectally daily as needed.  0    clopidogreL (PLAVIX) 75 mg tablet 1 tablet (75 mg total) by Per G Tube route once daily. 30 tablet 11    glycopyrrolate (CUVPOSA) 1 mg/5 mL (0.2 mg/mL) Soln Take 5 mLs (1 mg total) by mouth 3 (three) times daily as needed (TO REDUCE SECRETIONS). 473 mL 1    guaiFENesin 200 mg/5 mL Liqd Take 400 mg by mouth every 4 (four) hours as needed (for secretions). 473 mL 3    hydrOXYzine HCL (ATARAX) 25 MG tablet SMARTSI.5 Tablet(s) Gastro Tube Every 8 Hours PRN      melatonin (MELATIN) 3 mg tablet 2 tablets (6 mg total) by Per G Tube route nightly.  0    metoprolol succinate (TOPROL-XL) 200 MG 24 hr tablet Take 200 mg by mouth 2 (two) times daily.      omeprazole (PRILOSEC) 40 MG capsule Take 40 mg (contents of one capsule) twice daily through PEG tube. Mix contents of capsule with 10 mL applesauce. 60 capsule 2    polyethylene glycol (GLYCOLAX) 17 gram PwPk 17 g by Per G Tube route 2 (two) times daily.  0    sodium chloride (OCEAN) 0.65 % nasal spray 1 spray by Nasal route as needed for Congestion.  12    sodium chloride 3% 3 % nebulizer solution Take 4 mLs by nebulization 2 (two) times a day. 720 mL 3    WIXELA INHUB 250-50 mcg/dose diskus inhaler SMARTSI Puff(s) By Mouth Every 12 Hours      ondansetron (ZOFRAN-ODT) 8 MG TbDL Take 1 tablet (8 mg total) by mouth every 8 (eight) hours as needed (nausea). (Patient not taking: Reported on 10/26/2022) 30 tablet 1    oxyCODONE (ROXICODONE) 5 mg/5 mL Soln 5 mLs (5 mg total) by Per G Tube route every 4 (four) hours as needed (Pain). 150 mL 0    [DISCONTINUED] losartan (COZAAR) 50 MG tablet 1 tablet (50 mg total) by Per G Tube route once daily. 90 tablet 3    [DISCONTINUED] metoprolol tartrate (LOPRESSOR) 100 MG tablet 1 tablet (100 mg total) by Per G Tube route 2 (two) times daily. 60 tablet 11     No current facility-administered medications on file prior to visit.       Review of patient's allergies indicates:  No Known  Allergies    Physical Exam:  Constitutional:  not in acute distress and well developed  HENT: Head: Normal, normocephalic, atraumatic.  Eyes: conjunctiva clear and sclera nonicteric  Cardiovascular: regular rate and rhythm and no murmur  Respiratory: normal chest expansion & respiratory effort   and no accessory muscle use  GI: soft, non-tender, without masses or organomegaly  Musculoskeletal: no muscular tenderness noted  Skin: normal color  Neurological: alert, oriented x3  Psychiatric: mood and affect are within normal limits, pt is a good historian; no memory problems were noted    Laboratory:  Lab Results   Component Value Date     09/30/2022    K 3.6 09/30/2022    CL 97 09/30/2022    CO2 34 (H) 09/30/2022    BUN 23 09/30/2022    CREATININE 0.9 09/30/2022    CALCIUM 8.8 09/30/2022    ANIONGAP 11 09/30/2022    ESTGFRAFRICA >60.0 05/17/2022    EGFRNONAA >60.0 05/17/2022       Lab Results   Component Value Date    ALT 16 09/30/2022    AST 16 09/30/2022    ALKPHOS 72 09/30/2022    BILITOT 0.3 09/30/2022       Lab Results   Component Value Date    WBC 5.15 09/30/2022    HGB 8.4 (L) 09/30/2022    HCT 28.0 (L) 09/30/2022    MCV 84 09/30/2022     09/30/2022       Imaging:  No Pertinent Imaging    Assessment:  Fareed Richard Jr. is a 72 y.o. male who is presenting for post-hospitalization follow-up of melena/ anemia.    EGD with minimal esophagitis and healthy looking PEG.     Needs colonoscopy for screening purposes but currently refusing    Problems:  Anemia  Melena (resolved)  CRC screening    Plan:  Continue PPI daily through PEG  Will need second floor endoscopy if reconsiders    Melecio Wheeler MD  Gastroenterology Fellow    No orders of the defined types were placed in this encounter.

## 2022-10-26 NOTE — PROGRESS NOTES
I have seen the patient, reviewed Melecio Wheeler MD the GI fellow's history and physical, assessment, and plan. I have personally interviewed and examined the patient at bedside and I discussed the case with the GI fellow and I agree with the findings and plan as documented in the fellow's note.  Recommendations for diagnostic colonoscopy now for evaluation of his anemia explained to patient and his wife today in the room as well as a need for follow-up EGD to check for esophagitis healing in 8-12 weeks patient has adamantly refused further evaluation not interested in EGD not interested in colonoscopy wife is in agreement that he can make his own medical decisions the let us know if he changes is mine definitely will need 2nd floor location for procedures.

## 2022-11-23 ENCOUNTER — OFFICE VISIT (OUTPATIENT)
Dept: HEMATOLOGY/ONCOLOGY | Facility: CLINIC | Age: 72
End: 2022-11-23
Payer: MEDICARE

## 2022-11-23 ENCOUNTER — LAB VISIT (OUTPATIENT)
Dept: LAB | Facility: HOSPITAL | Age: 72
End: 2022-11-23
Attending: STUDENT IN AN ORGANIZED HEALTH CARE EDUCATION/TRAINING PROGRAM
Payer: MEDICARE

## 2022-11-23 VITALS
HEART RATE: 65 BPM | RESPIRATION RATE: 18 BRPM | BODY MASS INDEX: 20.68 KG/M2 | HEIGHT: 68 IN | DIASTOLIC BLOOD PRESSURE: 57 MMHG | OXYGEN SATURATION: 99 % | SYSTOLIC BLOOD PRESSURE: 113 MMHG

## 2022-11-23 DIAGNOSIS — E03.2 HYPOTHYROIDISM DUE TO MEDICAMENTS AND OTHER EXOGENOUS SUBSTANCES: ICD-10-CM

## 2022-11-23 DIAGNOSIS — J96.22 ACUTE ON CHRONIC RESPIRATORY FAILURE WITH HYPOXIA AND HYPERCAPNIA: ICD-10-CM

## 2022-11-23 DIAGNOSIS — D62 ACUTE BLOOD LOSS ANEMIA: ICD-10-CM

## 2022-11-23 DIAGNOSIS — C02.9 SQUAMOUS CELL CANCER OF TONGUE: ICD-10-CM

## 2022-11-23 DIAGNOSIS — C77.0 SECONDARY MALIGNANT NEOPLASM OF CERVICAL LYMPH NODE: ICD-10-CM

## 2022-11-23 DIAGNOSIS — E44.0 MODERATE PROTEIN-CALORIE MALNUTRITION: ICD-10-CM

## 2022-11-23 DIAGNOSIS — C02.9 SQUAMOUS CELL CANCER OF TONGUE: Primary | ICD-10-CM

## 2022-11-23 DIAGNOSIS — J96.21 ACUTE ON CHRONIC RESPIRATORY FAILURE WITH HYPOXIA AND HYPERCAPNIA: ICD-10-CM

## 2022-11-23 LAB
ALBUMIN SERPL BCP-MCNC: 3.1 G/DL (ref 3.5–5.2)
ALP SERPL-CCNC: 95 U/L (ref 55–135)
ALT SERPL W/O P-5'-P-CCNC: 14 U/L (ref 10–44)
ANION GAP SERPL CALC-SCNC: 6 MMOL/L (ref 8–16)
AST SERPL-CCNC: 17 U/L (ref 10–40)
BASOPHILS # BLD AUTO: 0.05 K/UL (ref 0–0.2)
BASOPHILS NFR BLD: 0.7 % (ref 0–1.9)
BILIRUB SERPL-MCNC: 0.2 MG/DL (ref 0.1–1)
BUN SERPL-MCNC: 23 MG/DL (ref 8–23)
CALCIUM SERPL-MCNC: 10 MG/DL (ref 8.7–10.5)
CHLORIDE SERPL-SCNC: 92 MMOL/L (ref 95–110)
CO2 SERPL-SCNC: 36 MMOL/L (ref 23–29)
CREAT SERPL-MCNC: 0.7 MG/DL (ref 0.5–1.4)
DIFFERENTIAL METHOD: ABNORMAL
EOSINOPHIL # BLD AUTO: 0.8 K/UL (ref 0–0.5)
EOSINOPHIL NFR BLD: 10.5 % (ref 0–8)
ERYTHROCYTE [DISTWIDTH] IN BLOOD BY AUTOMATED COUNT: 16.7 % (ref 11.5–14.5)
EST. GFR  (NO RACE VARIABLE): >60 ML/MIN/1.73 M^2
GLUCOSE SERPL-MCNC: 111 MG/DL (ref 70–110)
HCT VFR BLD AUTO: 34.7 % (ref 40–54)
HGB BLD-MCNC: 9.8 G/DL (ref 14–18)
IMM GRANULOCYTES # BLD AUTO: 0.04 K/UL (ref 0–0.04)
IMM GRANULOCYTES NFR BLD AUTO: 0.5 % (ref 0–0.5)
LYMPHOCYTES # BLD AUTO: 0.5 K/UL (ref 1–4.8)
LYMPHOCYTES NFR BLD: 7.4 % (ref 18–48)
MCH RBC QN AUTO: 25.3 PG (ref 27–31)
MCHC RBC AUTO-ENTMCNC: 28.2 G/DL (ref 32–36)
MCV RBC AUTO: 90 FL (ref 82–98)
MONOCYTES # BLD AUTO: 0.8 K/UL (ref 0.3–1)
MONOCYTES NFR BLD: 10.7 % (ref 4–15)
NEUTROPHILS # BLD AUTO: 5.1 K/UL (ref 1.8–7.7)
NEUTROPHILS NFR BLD: 70.2 % (ref 38–73)
NRBC BLD-RTO: 0 /100 WBC
PLATELET # BLD AUTO: 263 K/UL (ref 150–450)
PMV BLD AUTO: 9.3 FL (ref 9.2–12.9)
POTASSIUM SERPL-SCNC: 4.4 MMOL/L (ref 3.5–5.1)
PROT SERPL-MCNC: 7.5 G/DL (ref 6–8.4)
RBC # BLD AUTO: 3.87 M/UL (ref 4.6–6.2)
SODIUM SERPL-SCNC: 134 MMOL/L (ref 136–145)
T4 FREE SERPL-MCNC: 0.86 NG/DL (ref 0.71–1.51)
TSH SERPL DL<=0.005 MIU/L-ACNC: 4.92 UIU/ML (ref 0.4–4)
WBC # BLD AUTO: 7.3 K/UL (ref 3.9–12.7)

## 2022-11-23 PROCEDURE — 84439 ASSAY OF FREE THYROXINE: CPT | Performed by: STUDENT IN AN ORGANIZED HEALTH CARE EDUCATION/TRAINING PROGRAM

## 2022-11-23 PROCEDURE — 3008F PR BODY MASS INDEX (BMI) DOCUMENTED: ICD-10-PCS | Mod: CPTII,S$GLB,, | Performed by: STUDENT IN AN ORGANIZED HEALTH CARE EDUCATION/TRAINING PROGRAM

## 2022-11-23 PROCEDURE — 4010F PR ACE/ARB THEARPY RXD/TAKEN: ICD-10-PCS | Mod: CPTII,S$GLB,, | Performed by: STUDENT IN AN ORGANIZED HEALTH CARE EDUCATION/TRAINING PROGRAM

## 2022-11-23 PROCEDURE — 3008F BODY MASS INDEX DOCD: CPT | Mod: CPTII,S$GLB,, | Performed by: STUDENT IN AN ORGANIZED HEALTH CARE EDUCATION/TRAINING PROGRAM

## 2022-11-23 PROCEDURE — 4010F ACE/ARB THERAPY RXD/TAKEN: CPT | Mod: CPTII,S$GLB,, | Performed by: STUDENT IN AN ORGANIZED HEALTH CARE EDUCATION/TRAINING PROGRAM

## 2022-11-23 PROCEDURE — 1126F PR PAIN SEVERITY QUANTIFIED, NO PAIN PRESENT: ICD-10-PCS | Mod: CPTII,S$GLB,, | Performed by: STUDENT IN AN ORGANIZED HEALTH CARE EDUCATION/TRAINING PROGRAM

## 2022-11-23 PROCEDURE — 3044F PR MOST RECENT HEMOGLOBIN A1C LEVEL <7.0%: ICD-10-PCS | Mod: CPTII,S$GLB,, | Performed by: STUDENT IN AN ORGANIZED HEALTH CARE EDUCATION/TRAINING PROGRAM

## 2022-11-23 PROCEDURE — 1159F PR MEDICATION LIST DOCUMENTED IN MEDICAL RECORD: ICD-10-PCS | Mod: CPTII,S$GLB,, | Performed by: STUDENT IN AN ORGANIZED HEALTH CARE EDUCATION/TRAINING PROGRAM

## 2022-11-23 PROCEDURE — 3078F DIAST BP <80 MM HG: CPT | Mod: CPTII,S$GLB,, | Performed by: STUDENT IN AN ORGANIZED HEALTH CARE EDUCATION/TRAINING PROGRAM

## 2022-11-23 PROCEDURE — 3078F PR MOST RECENT DIASTOLIC BLOOD PRESSURE < 80 MM HG: ICD-10-PCS | Mod: CPTII,S$GLB,, | Performed by: STUDENT IN AN ORGANIZED HEALTH CARE EDUCATION/TRAINING PROGRAM

## 2022-11-23 PROCEDURE — 99999 PR PBB SHADOW E&M-EST. PATIENT-LVL IV: CPT | Mod: PBBFAC,,, | Performed by: STUDENT IN AN ORGANIZED HEALTH CARE EDUCATION/TRAINING PROGRAM

## 2022-11-23 PROCEDURE — 1101F PR PT FALLS ASSESS DOC 0-1 FALLS W/OUT INJ PAST YR: ICD-10-PCS | Mod: CPTII,S$GLB,, | Performed by: STUDENT IN AN ORGANIZED HEALTH CARE EDUCATION/TRAINING PROGRAM

## 2022-11-23 PROCEDURE — 1160F PR REVIEW ALL MEDS BY PRESCRIBER/CLIN PHARMACIST DOCUMENTED: ICD-10-PCS | Mod: CPTII,S$GLB,, | Performed by: STUDENT IN AN ORGANIZED HEALTH CARE EDUCATION/TRAINING PROGRAM

## 2022-11-23 PROCEDURE — 36415 COLL VENOUS BLD VENIPUNCTURE: CPT | Performed by: STUDENT IN AN ORGANIZED HEALTH CARE EDUCATION/TRAINING PROGRAM

## 2022-11-23 PROCEDURE — 84443 ASSAY THYROID STIM HORMONE: CPT | Performed by: STUDENT IN AN ORGANIZED HEALTH CARE EDUCATION/TRAINING PROGRAM

## 2022-11-23 PROCEDURE — 99215 PR OFFICE/OUTPT VISIT, EST, LEVL V, 40-54 MIN: ICD-10-PCS | Mod: S$GLB,,, | Performed by: STUDENT IN AN ORGANIZED HEALTH CARE EDUCATION/TRAINING PROGRAM

## 2022-11-23 PROCEDURE — 1126F AMNT PAIN NOTED NONE PRSNT: CPT | Mod: CPTII,S$GLB,, | Performed by: STUDENT IN AN ORGANIZED HEALTH CARE EDUCATION/TRAINING PROGRAM

## 2022-11-23 PROCEDURE — 3074F PR MOST RECENT SYSTOLIC BLOOD PRESSURE < 130 MM HG: ICD-10-PCS | Mod: CPTII,S$GLB,, | Performed by: STUDENT IN AN ORGANIZED HEALTH CARE EDUCATION/TRAINING PROGRAM

## 2022-11-23 PROCEDURE — 3074F SYST BP LT 130 MM HG: CPT | Mod: CPTII,S$GLB,, | Performed by: STUDENT IN AN ORGANIZED HEALTH CARE EDUCATION/TRAINING PROGRAM

## 2022-11-23 PROCEDURE — 3044F HG A1C LEVEL LT 7.0%: CPT | Mod: CPTII,S$GLB,, | Performed by: STUDENT IN AN ORGANIZED HEALTH CARE EDUCATION/TRAINING PROGRAM

## 2022-11-23 PROCEDURE — 85025 COMPLETE CBC W/AUTO DIFF WBC: CPT | Performed by: STUDENT IN AN ORGANIZED HEALTH CARE EDUCATION/TRAINING PROGRAM

## 2022-11-23 PROCEDURE — 1160F RVW MEDS BY RX/DR IN RCRD: CPT | Mod: CPTII,S$GLB,, | Performed by: STUDENT IN AN ORGANIZED HEALTH CARE EDUCATION/TRAINING PROGRAM

## 2022-11-23 PROCEDURE — 80053 COMPREHEN METABOLIC PANEL: CPT | Performed by: STUDENT IN AN ORGANIZED HEALTH CARE EDUCATION/TRAINING PROGRAM

## 2022-11-23 PROCEDURE — 3288F PR FALLS RISK ASSESSMENT DOCUMENTED: ICD-10-PCS | Mod: CPTII,S$GLB,, | Performed by: STUDENT IN AN ORGANIZED HEALTH CARE EDUCATION/TRAINING PROGRAM

## 2022-11-23 PROCEDURE — 1101F PT FALLS ASSESS-DOCD LE1/YR: CPT | Mod: CPTII,S$GLB,, | Performed by: STUDENT IN AN ORGANIZED HEALTH CARE EDUCATION/TRAINING PROGRAM

## 2022-11-23 PROCEDURE — 99215 OFFICE O/P EST HI 40 MIN: CPT | Mod: S$GLB,,, | Performed by: STUDENT IN AN ORGANIZED HEALTH CARE EDUCATION/TRAINING PROGRAM

## 2022-11-23 PROCEDURE — 1159F MED LIST DOCD IN RCRD: CPT | Mod: CPTII,S$GLB,, | Performed by: STUDENT IN AN ORGANIZED HEALTH CARE EDUCATION/TRAINING PROGRAM

## 2022-11-23 PROCEDURE — 99999 PR PBB SHADOW E&M-EST. PATIENT-LVL IV: ICD-10-PCS | Mod: PBBFAC,,, | Performed by: STUDENT IN AN ORGANIZED HEALTH CARE EDUCATION/TRAINING PROGRAM

## 2022-11-23 PROCEDURE — 3288F FALL RISK ASSESSMENT DOCD: CPT | Mod: CPTII,S$GLB,, | Performed by: STUDENT IN AN ORGANIZED HEALTH CARE EDUCATION/TRAINING PROGRAM

## 2022-11-23 NOTE — ASSESSMENT & PLAN NOTE
No evidence of recurrence. Physical exam unchanged.  -labs reviewed  -plan for labs and follow up in 1 year.  --needs TSH monitoring at least q6 months  -continue surveillance with ENT

## 2022-11-23 NOTE — Clinical Note
He looks good. Again had to re-emphasize he didn't need 4L oxygen. Reviewed CT 10/2022, looks like infectious/inflammatory findings are stable with one new area of atelectasis.  I can order another scan if you want to follow up on it - just let me know your preferred interval. -E

## 2022-11-23 NOTE — ASSESSMENT & PLAN NOTE
Wt Readings from Last 6 Encounters:   10/26/22 61.7 kg (136 lb)   10/25/22 61.8 kg (136 lb 3.9 oz)   10/12/22 59.9 kg (132 lb 0.9 oz)   09/24/22 59.9 kg (132 lb)   09/14/22 61.2 kg (135 lb)   09/14/22 61.2 kg (134 lb 14.7 oz)     Weight stable, albumin improved to >3

## 2022-11-23 NOTE — ASSESSMENT & PLAN NOTE
Admitted 9/23/22-9/30/22 for multifocal pneumonia.  Since discharge, he is doing well.  4L TC = 99% pulse ox.  10/2022 CT scan with stable findings overall and one new area of atelectasis.  -discussed with him that he may decrease oxygen and titrate to keep at 88%.    -update pulmonology with regards to his most recent CT scan and if it needs further follow up

## 2022-11-23 NOTE — PROGRESS NOTES
PATIENT: Fareed Richard Jr.  MRN: [id number]  DATE: 11/23/2022      Diagnosis:   1. Squamous cell cancer of tongue    2. Acute on chronic respiratory failure with hypoxia and hypercapnia    3. Secondary malignant neoplasm of cervical lymph node    4. Moderate protein-calorie malnutrition    5. 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 Acute blood loss anemia        Chief Complaint: Squamous cell cancer of tongue      Oncologic History:    Oncologic History 1. Squamous cell carcinoma of tongue    Oncologic Treatment 1.  1/4/22 total glossectomy, bilateral neck dissection, bilateral cervical facial advancement flaps and anterolateral thigh free flap reconstruction of his glossectomy defect   2. 2/28/22-4/20/22 adjuvant chemoradiation with cisplatin    Pathology Squamous cell carcinoma, poorly differentiated, p16 negative  Final pathology: pT4a:  Moderately advanced local disease.  Tumor size = 6.0 cm with DOI = 29 mm and invades adjacent structures.   pN3b:  Metastasis in single contralateral node, SALINAS(+).   pM:  Not applicable.   Superior soft tissue margin of the left neck is involved by tumor           Subjective:    Interval History: Mr. Richard is here for follow up    Oncological History copied from medical chart: Initially saw Aletha Cook NP (ENT) 11/29/21 regarding non-healing left sided tongue lesion for about 6 weeks.  Associated symptoms included tongue pain, bleeding, left otalgia, weight loss from difficulty eating, and bump under left chin that enlarged and was refractory to oral antibiotics and nystatin.   History significant for skin cancer of the left cheek and right lower lip about 5-6 years ago. He reported he had a flap done with Dr. Starks. His wife notes that one of the cancers was SCC and the other was basal cell but she cannot call which was which. 12/6/21 CT neck was done (findings below). 12/15/21 he saw Dr. Smalls who performed an FNA of his left neck mass, which  finalized as squamous cell carcinoma, p16 negative.   12/16/21 CT chest without contrast was done (findings below).  1/4/22 underwent total glossectomy, bilateral neck dissection, bilateral cervical facial advancement flaps and anterolateral thigh free flap reconstruction of his glossectomy defect.  Final pathology [id number], +SALINAS (microscopic), +margin (superior soft tissue margin of left neck).    He completed chemoradiation 4/20/22    Interval History:   Hospitalized 9/23/22-9/30/22 for multifocal pneumonia.  Since discharge he has been doing well.  Still using 4L TC at this visit and 6L at home.    No new neck swelling or other symptoms.Tube feed dependent; all nutrition is via peg tube. S/p glossectomy.    Former smoker, 110 pack years.    Past Medical History:   Past Medical History:   Diagnosis Date    Cancer     skin to Rt forearm and face    COPD (chronic obstructive pulmonary disease)     Hyperlipidemia     Hypertension     Pseudoaneurysm        Past Surgical HIstory:   Past Surgical History:   Procedure Laterality Date    ABDOMINAL SURGERY      stents placed in liver and large intestines, per patient    CAROTID STENT      COLONOSCOPY N/A 9/27/2022    Procedure: COLONOSCOPY;  Surgeon: Donnie Peterson MD;  Location: [facility] ([address] [place] [address]);  Service: Endoscopy;  Laterality: N/A;    COLONOSCOPY N/A 9/30/2022    Procedure: COLONOSCOPY;  Surgeon: Joy Cabrera MD;  Location: [facility] ([address] [place] [address]);  Service: Endoscopy;  Laterality: N/A;    CORONARY STENT PLACEMENT  01/2000    DISSECTION OF NECK Bilateral 1/4/2022    Procedure: DISSECTION, NECK;  Surgeon: Naeem Smalls MD;  Location: [facility] OR [address] [place] [address];  Service: ENT;  Laterality: Bilateral;    ESOPHAGOGASTRODUODENOSCOPY N/A 9/27/2022    Procedure: EGD (ESOPHAGOGASTRODUODENOSCOPY);  Surgeon: Donnie Peterson MD;  [facility]  Service: Endoscopy;  Laterality: N/A;    ESOPHAGOGASTRODUODENOSCOPY N/A 9/30/2022    Procedure: EGD (ESOPHAGOGASTRODUODENOSCOPY);   Surgeon: Joy Cabrera MD;  Location: [facility] ENDO (2ND FLR);  Service: Endoscopy;  Laterality: N/A;    ESOPHAGOGASTRODUODENOSCOPY W/ PEG N/A 1/4/2022    Procedure: EGD, WITH PEG TUBE INSERTION;  Surgeon: Anthony Calabrese MD;  [facility];  Service: General;  Laterality: N/A;    EYE SURGERY      Cataract bilateral    femoral stents      bilateral    FLAP PROCEDURE N/A 1/3/2022    Procedure: CREATION, FREE FLAP;  Surgeon: Naeem Smalls MD;  [facility];  Service: ENT;  Laterality: N/A;    FLAP PROCEDURE Right 1/4/2022    Procedure: CREATION, FREE FLAP;  Surgeon: Stacey Conde MD;  [facility];  Service: ENT;  Laterality: Right;  Ischemic start 1203  Ischemic stop 1353    GLOSSECTOMY N/A 1/4/2022    Procedure: GLOSSECTOMY;  Surgeon: Naeem Smalls MD;  [facility];  Service: ENT;  Laterality: N/A;    RADICAL NECK DISSECTION Left 1/3/2022    Procedure: DISSECTION, NECK, RADICAL;  Surgeon: Naeem Smalls MD;  [facility];  Service: ENT;  Laterality: Left;    SKIN CANCER EXCISION      TRACHEOTOMY N/A 1/4/2022    Procedure: TRACHEOTOMY;  Surgeon: Naeem Smalls MD;  [facility];  Service: ENT;  Laterality: N/A;       Family History: History reviewed. No pertinent family history.    Social History:  reports that he quit smoking about 4 years ago. His smoking use included cigarettes. He started smoking about 59 years ago. He has a 80.00 pack-year smoking history. He has never used smokeless tobacco. He reports that he does not currently use alcohol after a past usage of about 3.0 standard drinks per week. He reports that he does not use drugs.    Allergies:  Review of patient's allergies indicates:  No Known Allergies    Medications:  Current Outpatient Medications   Medication Sig Dispense Refill    aspirin 81 MG Chew 1 tablet (81 mg total) by Per G Tube route once daily.  0    atorvastatin (LIPITOR) 20 MG tablet  1 tablet (20 mg total) by Per G Tube route once daily. 90 tablet 3    bisacodyL (DULCOLAX) 10 mg Supp Place 1 suppository (10 mg total) rectally daily as needed.  0    clopidogreL (PLAVIX) 75 mg tablet 1 tablet (75 mg total) by Per G Tube route once daily. 30 tablet 11    glycopyrrolate (CUVPOSA) 1 mg/5 mL (0.2 mg/mL) Soln Take 5 mLs (1 mg total) by mouth 3 (three) times daily as needed (TO REDUCE SECRETIONS). 473 mL 1    guaiFENesin 200 mg/5 mL Liqd Take 400 mg by mouth every 4 (four) hours as needed (for secretions). 473 mL 3    hydrOXYzine HCL (ATARAX) 25 MG tablet SMARTSI.5 Tablet(s) Gastro Tube Every 8 Hours PRN      melatonin (MELATIN) 3 mg tablet 2 tablets (6 mg total) by Per G Tube route nightly.  0    metoprolol succinate (TOPROL-XL) 200 MG 24 hr tablet Take 200 mg by mouth 2 (two) times daily.      omeprazole (PRILOSEC) 40 MG capsule Take 40 mg (contents of one capsule) twice daily through PEG tube. Mix contents of capsule with 10 mL applesauce. 60 capsule 2    ondansetron (ZOFRAN-ODT) 8 MG TbDL Take 1 tablet (8 mg total) by mouth every 8 (eight) hours as needed (nausea). (Patient not taking: Reported on 10/26/2022) 30 tablet 1    oxyCODONE (ROXICODONE) 5 mg/5 mL Soln 5 mLs (5 mg total) by Per G Tube route every 4 (four) hours as needed (Pain). 150 mL 0    polyethylene glycol (GLYCOLAX) 17 gram PwPk 17 g by Per G Tube route 2 (two) times daily.  0    sodium chloride (OCEAN) 0.65 % nasal spray 1 spray by Nasal route as needed for Congestion.  12    sodium chloride 3% 3 % nebulizer solution Take 4 mLs by nebulization 2 (two) times a day. 720 mL 3    WIXELA INHUB 250-50 mcg/dose diskus inhaler SMARTSI Puff(s) By Mouth Every 12 Hours       No current facility-administered medications for this visit.       Review of Systems   Constitutional:  Positive for fatigue. Negative for activity change, appetite change, chills, diaphoresis, fever and unexpected weight change.   HENT:  Positive for trouble  "swallowing and voice change. Negative for ear pain, mouth sores and nosebleeds.    Eyes:  Negative for visual disturbance.   Respiratory:  Positive for shortness of breath. Negative for cough, chest tightness and wheezing.    Cardiovascular:  Negative for chest pain and leg swelling.   Gastrointestinal:  Negative for abdominal distention, abdominal pain, blood in stool, constipation, diarrhea, nausea and vomiting.   Endocrine: Negative for cold intolerance and heat intolerance.   Genitourinary:  Negative for difficulty urinating and dysuria.   Musculoskeletal:  Negative for arthralgias and back pain.   Skin:  Negative for color change.   Neurological:  Positive for weakness. Negative for dizziness, light-headedness, numbness and headaches.   Hematological:  Negative for adenopathy. Does not bruise/bleed easily.   Psychiatric/Behavioral:  Negative for confusion.      ECOG Performance Status:     ECOG SCORE    2 - Capable of all selfcare but unable to carry out any work activities, active > 50% of hours         Objective:      Vitals:   Vitals:    11/23/22 0829   BP: (!) 113/57   BP Location: Right arm   Patient Position: Sitting   BP Method: Medium (Automatic)   Pulse: 65   Resp: 18   SpO2: 99%   Height: 5' 8" (1.727 m)     BMI: Body mass index is 20.68 kg/m².    Physical Exam  Constitutional:       General: He is not in acute distress.     Appearance: He is ill-appearing.   HENT:      Head: Normocephalic and atraumatic.      Mouth/Throat:      Pharynx: No oropharyngeal exudate or posterior oropharyngeal erythema.   Eyes:      General: No scleral icterus.     Extraocular Movements: Extraocular movements intact.      Conjunctiva/sclera: Conjunctivae normal.      Pupils: Pupils are equal, round, and reactive to light.   Neck:      Comments: +trach  Cardiovascular:      Rate and Rhythm: Normal rate and regular rhythm.      Heart sounds: No murmur heard.    No friction rub. No gallop.   Pulmonary:      Effort: Pulmonary " effort is normal. No respiratory distress.      Breath sounds: No stridor. No wheezing, rhonchi or rales.   Abdominal:      General: Bowel sounds are normal. There is no distension.      Palpations: Abdomen is soft. There is no mass.      Tenderness: There is no abdominal tenderness. There is no guarding or rebound.      Comments: +peg tube site c/d/i   Musculoskeletal:         General: Normal range of motion.      Cervical back: Normal range of motion and neck supple.      Right lower leg: No edema.      Left lower leg: No edema.   Skin:     General: Skin is warm and dry.      Findings: No erythema.   Neurological:      General: No focal deficit present.      Mental Status: He is alert.      Motor: Weakness present.       Laboratory Data:   Recent Results (from the past 168 hour(s))   CBC Auto Differential    Collection Time: 11/23/22  7:33 AM   Result Value Ref Range    WBC 7.30 3.90 - 12.70 K/uL    RBC 3.87 (L) 4.60 - 6.20 M/uL    Hemoglobin 9.8 (L) 14.0 - 18.0 g/dL    Hematocrit 34.7 (L) 40.0 - 54.0 %    MCV 90 82 - 98 fL    MCH 25.3 (L) 27.0 - 31.0 pg    MCHC 28.2 (L) 32.0 - 36.0 g/dL    RDW 16.7 (H) 11.5 - 14.5 %    Platelets 263 150 - 450 K/uL    MPV 9.3 9.2 - 12.9 fL    Immature Granulocytes 0.5 0.0 - 0.5 %    Gran # (ANC) 5.1 1.8 - 7.7 K/uL    Immature Grans (Abs) 0.04 0.00 - 0.04 K/uL    Lymph # 0.5 (L) 1.0 - 4.8 K/uL    Mono # 0.8 0.3 - 1.0 K/uL    Eos # 0.8 (H) 0.0 - 0.5 K/uL    Baso # 0.05 0.00 - 0.20 K/uL    nRBC 0 0 /100 WBC    Gran % 70.2 38.0 - 73.0 %    Lymph % 7.4 (L) 18.0 - 48.0 %    Mono % 10.7 4.0 - 15.0 %    Eosinophil % 10.5 (H) 0.0 - 8.0 %    Basophil % 0.7 0.0 - 1.9 %    Differential Method Automated    Comprehensive Metabolic Panel    Collection Time: 11/23/22  7:33 AM   Result Value Ref Range    Sodium 134 (L) 136 - 145 mmol/L    Potassium 4.4 3.5 - 5.1 mmol/L    Chloride 92 (L) 95 - 110 mmol/L    CO2 36 (H) 23 - 29 mmol/L    Glucose 111 (H) 70 - 110 mg/dL    BUN 23 8 - 23 mg/dL     Creatinine 0.7 0.5 - 1.4 mg/dL    Calcium 10.0 8.7 - 10.5 mg/dL    Total Protein 7.5 6.0 - 8.4 g/dL    Albumin 3.1 (L) 3.5 - 5.2 g/dL    Total Bilirubin 0.2 0.1 - 1.0 mg/dL    Alkaline Phosphatase 95 55 - 135 U/L    AST 17 10 - 40 U/L    ALT 14 10 - 44 U/L    Anion Gap 6 (L) 8 - 16 mmol/L    eGFR >60.0 >60 mL/min/1.73 m^2       Imaging:     10/19/22 CT chest without contrast  CT CHEST WITHOUT CONTRAST     CLINICAL HISTORY:  worsening SOB, ground glass opacity; Other nonspecific abnormal finding of lung field     TECHNIQUE:  Low dose axial images, sagittal and coronal reformations were obtained from the thoracic inlet to the lung bases. Contrast was not administered.     COMPARISON:  CT chest 09/27/2022, CTA chest 09/14/2022     FINDINGS:  Base of Neck: No significant abnormality.     Thoracic soft tissues: High-density material in the right axilla.  No axillary lymphadenopathy.     Aorta: 3 vessel left-sided aortic arch with severe calcific atherosclerosis including at the 3 vessel origin.  Aortic annular calcifications.  No aneurysmal.     Heart: Mild cardiomegaly.  Severe bilateral calcification of coronary arteries versus stents.  No effusion.     Pulmonary vasculature: Mild prominence of pulmonary trunk measuring up to 3.1 cm.  The pulmonary veins are unremarkable.     Darcy/Mediastinum: Prominent to mildly enlarged mediastinal nodes, for example the lower paratracheal lymph node measuring up to 1.2 cm and similar to prior.     Airways: Tracheal tube appears to be in stable position.  Peribronchial cuffing most prominent in the bilateral lower lobes with scattered areas of mucoid impaction.     Lungs/Pleura:     Moderate to confluent paraseptal and centrilobular emphysema.  Minimally enlarged left pleural effusion, remaining small in size.     New 0.8 x 1 4 cm opacity extends from the pleura in the apicoposterior segment of the left upper lobe (series 4, image 85), which may represent atelectatic change.      Improvement of the previously seen 0.9 cm left upper lobe nodule.     There are stable bilateral irregular opacities, some triangular and some rectangular.  These include the following is examples:     Stable 7 mm left upper lobe nodule (4-216).     Stable 2 rectangular opacities in the left upper lobe both seen on series 4-256, measuring 0.9 and 1.4 cm respectively.     Stable right upper lobe 1.1 cm triangular opacity (4-270).     Stable right upper lobe 0.9 x 1.6 cm triangular opacity (4-285).     Again seen, bilateral lower lobe predominant tubular bronchiectasis, mucoid impaction, and numerous tree-in-bud nodules consistent with small airways disease.  There are bilateral lower lobe patchy consolidations and nodular infiltrates, which appear grossly stable.     Continued demonstration of numerous micro nodules some of which are in a tree-in-bud opacities most pronounced in the bilateral lower lobes, few of which appear minimally improved from prior.     Esophagus: Normal.     Upper Abdomen: Partially visualized 1.2 cm hypodensity in superior pole of the right kidney, similar to prior.  Partially visualized gastric tube appears in stable position.     Bones: Demineralized bones.  Osseous degenerative changes without evidence acute fracture.  No osseous destructive lesions.     Impression:     1.  Improvement of the previously seen 9 mm left upper lobe nodule, favoring previous infectious/inflammatory changes.     2.  Interval development of a 1.3 cm opacity which appears to extend from the pleura in the apical left upper lobe which may reflect atelectatic versus infectious/inflammatory changes.     3.  Several irregular pulmonary nodular opacities, stable since prior exam.     4.  Bilateral lower lobe mucoid impaction, small airways disease, and patchy consolidations which appears unchanged since prior exam.         Assessment:       1. Squamous cell cancer of tongue    2. Acute on chronic respiratory failure  with hypoxia and hypercapnia    3. Secondary malignant neoplasm of cervical lymph node    4. Moderate protein-calorie malnutrition    5. 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 Acute blood loss anemia           Plan:       Problem List Items Addressed This Visit          Pulmonary    Acute on chronic respiratory failure - Primary    Current Assessment & Plan     Admitted 9/23/22-9/30/22 for multifocal pneumonia.  Since discharge, he is doing well.  4LNC = 99% pulse ox.  10/2022 CT scan with stable findings overall and one new area of atelectasis.  -discussed with him that he may decrease oxygen and titrate to keep at 88%.    -update pulmonology with regards to his most recent CT scan and if it needs further follow up            Oncology    Squamous cell cancer of tongue    Overview     12/15/21 FNA left neck mass : squamous cell carcinoma, p16 negative  1/4/22 total glossectomy, bilateral neck dissection, bilateral cervical facial advancement flaps and anterolateral thigh free flap reconstruction of his glossectomy defect.  Final path :  [id number] (+microscopic SALINAS, single contralateral node), superior soft tissue margin of left neck with tumor.    2/28/22-4/20/22 completed adjuvant chemoradiation with weekly cisplatin (240 mg/m2 cumulative dose)         Current Assessment & Plan     No evidence of recurrence. Physical exam unchanged.  -labs reviewed  -plan for labs and follow up in 1 year.  --needs TSH monitoring at least q6 months  -continue surveillance with ENT         Acute blood loss anemia    Current Assessment & Plan     No further blood loss. Hemoglobin uptrending  -monitor cbc         Secondary malignant neoplasm of cervical lymph node    Overview     See sq.c.c. tongue            Endocrine    Protein-calorie malnutrition    Current Assessment & Plan     Wt Readings from Last 6 Encounters:   10/26/22 61.7 kg (136 lb)   10/25/22 61.8 kg (136 lb 3.9 oz)   10/12/22 59.9 kg (132  lb 0.9 oz)   09/24/22 59.9 kg (132 lb)   09/14/22 61.2 kg (135 lb)   09/14/22 61.2 kg (134 lb 14.7 oz)     Weight stable, albumin improved to >3         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     Current Assessment & Plan     History of head neck radiation  -TSH monitoring q6 months            Route Chart for Scheduling    Med Onc Chart Routing      Follow up with physician 1 year. in 1 year with dr duran to review lab results   Follow up with HEI[facility]    Infusion scheduling note    Injection scheduling note    Labs CMP, CBC and TSH   Lab interval:  in 1 year TSH cmp cbc   Imaging None      Pharmacy appointment No pharmacy appointment needed      Other referrals No additional referrals needed                Christopher Jay MD  Hematology Oncology          Addendum:  Reviewed CT scan with Dr. Wadsworth; agreed on repeat CT chest in 6 months.  Will have repeat labs (TSH) and CT in 6 months with follow up with Dr. Brina Jay MD  Hematology Oncology

## 2022-12-10 DIAGNOSIS — C02.9 SQUAMOUS CELL CANCER OF TONGUE: ICD-10-CM

## 2022-12-10 DIAGNOSIS — R68.89 EXCESSIVE ORAL SECRETIONS: ICD-10-CM

## 2022-12-13 ENCOUNTER — OFFICE VISIT (OUTPATIENT)
Dept: OTOLARYNGOLOGY | Facility: CLINIC | Age: 72
End: 2022-12-13
Payer: MEDICARE

## 2022-12-13 VITALS — HEART RATE: 75 BPM | DIASTOLIC BLOOD PRESSURE: 57 MMHG | SYSTOLIC BLOOD PRESSURE: 92 MMHG

## 2022-12-13 DIAGNOSIS — C02.9 SQUAMOUS CELL CANCER OF TONGUE: Primary | ICD-10-CM

## 2022-12-13 PROCEDURE — 1159F MED LIST DOCD IN RCRD: CPT | Mod: CPTII,S$GLB,, | Performed by: OTOLARYNGOLOGY

## 2022-12-13 PROCEDURE — 99999 PR PBB SHADOW E&M-EST. PATIENT-LVL III: ICD-10-PCS | Mod: PBBFAC,,, | Performed by: OTOLARYNGOLOGY

## 2022-12-13 PROCEDURE — 1160F PR REVIEW ALL MEDS BY PRESCRIBER/CLIN PHARMACIST DOCUMENTED: ICD-10-PCS | Mod: CPTII,S$GLB,, | Performed by: OTOLARYNGOLOGY

## 2022-12-13 PROCEDURE — 3078F PR MOST RECENT DIASTOLIC BLOOD PRESSURE < 80 MM HG: ICD-10-PCS | Mod: CPTII,S$GLB,, | Performed by: OTOLARYNGOLOGY

## 2022-12-13 PROCEDURE — 3074F SYST BP LT 130 MM HG: CPT | Mod: CPTII,S$GLB,, | Performed by: OTOLARYNGOLOGY

## 2022-12-13 PROCEDURE — 3044F HG A1C LEVEL LT 7.0%: CPT | Mod: CPTII,S$GLB,, | Performed by: OTOLARYNGOLOGY

## 2022-12-13 PROCEDURE — 1160F RVW MEDS BY RX/DR IN RCRD: CPT | Mod: CPTII,S$GLB,, | Performed by: OTOLARYNGOLOGY

## 2022-12-13 PROCEDURE — 99213 OFFICE O/P EST LOW 20 MIN: CPT | Mod: S$GLB,,, | Performed by: OTOLARYNGOLOGY

## 2022-12-13 PROCEDURE — 99213 PR OFFICE/OUTPT VISIT, EST, LEVL III, 20-29 MIN: ICD-10-PCS | Mod: S$GLB,,, | Performed by: OTOLARYNGOLOGY

## 2022-12-13 PROCEDURE — 3288F PR FALLS RISK ASSESSMENT DOCUMENTED: ICD-10-PCS | Mod: CPTII,S$GLB,, | Performed by: OTOLARYNGOLOGY

## 2022-12-13 PROCEDURE — 1159F PR MEDICATION LIST DOCUMENTED IN MEDICAL RECORD: ICD-10-PCS | Mod: CPTII,S$GLB,, | Performed by: OTOLARYNGOLOGY

## 2022-12-13 PROCEDURE — 3074F PR MOST RECENT SYSTOLIC BLOOD PRESSURE < 130 MM HG: ICD-10-PCS | Mod: CPTII,S$GLB,, | Performed by: OTOLARYNGOLOGY

## 2022-12-13 PROCEDURE — 3288F FALL RISK ASSESSMENT DOCD: CPT | Mod: CPTII,S$GLB,, | Performed by: OTOLARYNGOLOGY

## 2022-12-13 PROCEDURE — 3044F PR MOST RECENT HEMOGLOBIN A1C LEVEL <7.0%: ICD-10-PCS | Mod: CPTII,S$GLB,, | Performed by: OTOLARYNGOLOGY

## 2022-12-13 PROCEDURE — 99999 PR PBB SHADOW E&M-EST. PATIENT-LVL III: CPT | Mod: PBBFAC,,, | Performed by: OTOLARYNGOLOGY

## 2022-12-13 PROCEDURE — 4010F PR ACE/ARB THEARPY RXD/TAKEN: ICD-10-PCS | Mod: CPTII,S$GLB,, | Performed by: OTOLARYNGOLOGY

## 2022-12-13 PROCEDURE — 1126F AMNT PAIN NOTED NONE PRSNT: CPT | Mod: CPTII,S$GLB,, | Performed by: OTOLARYNGOLOGY

## 2022-12-13 PROCEDURE — 1101F PT FALLS ASSESS-DOCD LE1/YR: CPT | Mod: CPTII,S$GLB,, | Performed by: OTOLARYNGOLOGY

## 2022-12-13 PROCEDURE — 1126F PR PAIN SEVERITY QUANTIFIED, NO PAIN PRESENT: ICD-10-PCS | Mod: CPTII,S$GLB,, | Performed by: OTOLARYNGOLOGY

## 2022-12-13 PROCEDURE — 1101F PR PT FALLS ASSESS DOC 0-1 FALLS W/OUT INJ PAST YR: ICD-10-PCS | Mod: CPTII,S$GLB,, | Performed by: OTOLARYNGOLOGY

## 2022-12-13 PROCEDURE — 4010F ACE/ARB THERAPY RXD/TAKEN: CPT | Mod: CPTII,S$GLB,, | Performed by: OTOLARYNGOLOGY

## 2022-12-13 PROCEDURE — 3078F DIAST BP <80 MM HG: CPT | Mod: CPTII,S$GLB,, | Performed by: OTOLARYNGOLOGY

## 2022-12-13 RX ORDER — GLYCOPYRROLATE 1 MG/5ML
LIQUID ORAL
Qty: 473 ML | Refills: 1 | Status: ON HOLD | OUTPATIENT
Start: 2022-12-13 | End: 2024-01-24

## 2022-12-18 NOTE — PROGRESS NOTES
Chief Complaint   Patient presents with    Tracheostomy Tube Change     Oncology History   Squamous cell cancer of tongue   12/16/2021 Initial Diagnosis    Squamous cell cancer of tongue     12/16/2021 Tumor Conference       His case was discussed at the Multidisciplinary Head and Neck Team Planning Meeting.    Representatives from Medical Oncology, Radiation Oncology, Head and Neck Surgical Oncology, Psychosocial Oncology, and Speech and Language Pathology discussed the case with the following recommendations:    1) surgery  2) work up lung nodule post op         1/2/2022 Cancer Staged    Staging form: Oral Cavity, AJCC 8th Edition  - Clinical stage from 1/2/2022: Stage NAFISA (cT2, cN2a, cM0)       1/4/2022 Cancer Staged    Staging form: Oral Cavity, AJCC 8th Edition  - Pathologic stage from 1/4/2022: Stage IVB (pT4a, pN3b, cM0)       2/28/2022 - 4/6/2022 Chemotherapy    Treatment Summary   Plan Name: OP HEAD NECK CISPLATIN WEEKLY + RADIOTHERAPY  Treatment Goal: Curative  Status: Inactive  Start Date: 2/28/2022  End Date: 4/6/2022  Provider: Christopher Jay MD  Chemotherapy: CISplatin (PLATINOL) 40 mg/m2 = 69 mg in sodium chloride 0.9% 500 mL chemo infusion, 40 mg/m2 = 69 mg, Intravenous, Clinic/HOD 1 time, 6 of 7 cycles  Administration: 69 mg (2/28/2022), 66 mg (3/7/2022), 69 mg (3/16/2022), 69 mg (3/23/2022), 69 mg (3/30/2022), 70 mg (4/6/2022)        Radiation Therapy    Treatment Summary  Course: C1 HN 2022  Treatment Site Ref. ID Energy Dose/Fx (Gy) #Fx Dose Correction (Gy) Total Dose (Gy) Start Date End Date Elapsed Days   IM H&N:1 PTV_66 6X 2 7 / 7 0 14 2/28/2022 3/10/2022 10   IM H&N2 PTV_66 6X 2 26 / 26 0 51.903 3/11/2022 4/22/2022 42      Secondary malignant neoplasm of cervical lymph node   2/16/2022 Initial Diagnosis    Secondary malignant neoplasm of cervical lymph node     2/28/2022 - 4/6/2022 Chemotherapy    Treatment Summary   Plan Name: OP HEAD NECK CISPLATIN WEEKLY + RADIOTHERAPY  Treatment Goal:  Curative  Status: Inactive  Start Date: 2/28/2022  End Date: 4/6/2022  Provider: Christopher Jay MD  Chemotherapy: CISplatin (PLATINOL) 40 mg/m2 = 69 mg in sodium chloride 0.9% 500 mL chemo infusion, 40 mg/m2 = 69 mg, Intravenous, Clinic/HOD 1 time, 6 of 7 cycles  Administration: 69 mg (2/28/2022), 66 mg (3/7/2022), 69 mg (3/16/2022), 69 mg (3/23/2022), 69 mg (3/30/2022), 70 mg (4/6/2022)             HPI   72 y.o. male presents with the above treatment history.  No new complaints.     Review of Systems   Constitutional: Negative for fatigue and unexpected weight change.   HENT: Per HPI.  Eyes: Negative for visual disturbance.   Respiratory: Negative for shortness of breath, hemoptysis   Cardiovascular: Negative for chest pain and palpitations.   Musculoskeletal: Negative for decreased ROM, back pain.   Skin: Negative for rash, sunburn, itching.   Neurological: Negative for dizziness and seizures.   Hematological: Negative for adenopathy. Does not bruise/bleed easily.   Endocrine: Negative for rapid weight loss/weight gain, heat/cold intolerance.     Past Medical History   Patient Active Problem List   Diagnosis    Peripheral vascular disease    Carotid stenosis    Squamous cell cancer of tongue    Coronary artery disease involving native coronary artery of native heart without angina pectoris    Primary hypertension    Other hyperlipidemia    Impaired mobility and ADLs    Tracheobronchitis    Toxic effect of tobacco cigarette, intentional self-harm    COPD exacerbation    Acute blood loss anemia    Secondary malignant neoplasm of cervical lymph node    Protein-calorie malnutrition    Chronic respiratory failure with hypoxia, on home O2 therapy    Acute on chronic respiratory failure    Dysphagia    Aspiration pneumonia    Goals of care, counseling/discussion    Pulmonary infiltrate    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     Bloody sputum    Tracheostomy present    PEG (percutaneous  endoscopic gastrostomy) status    Elevated brain natriuretic peptide (BNP) level    Acute tracheitis    Gastrointestinal hemorrhage with melena    Pneumonia           Past Surgical History   Past Surgical History:   Procedure Laterality Date    ABDOMINAL SURGERY      stents placed in liver and large intestines, per patient    CAROTID STENT      COLONOSCOPY N/A 9/27/2022    Procedure: COLONOSCOPY;  Surgeon: Donnie Peterson MD;  Location: [facility] ENDO (2ND FLR);  Service: Endoscopy;  Laterality: N/A;    COLONOSCOPY N/A 9/30/2022    Procedure: COLONOSCOPY;  Surgeon: Joy Cabrera MD;  Location: [facility] ENDO (2ND FLR);  Service: Endoscopy;  Laterality: N/A;    CORONARY STENT PLACEMENT  01/2000    DISSECTION OF NECK Bilateral 1/4/2022    Procedure: DISSECTION, NECK;  Surgeon: Naeem Smalls MD;  [facility];  Service: ENT;  Laterality: Bilateral;    ESOPHAGOGASTRODUODENOSCOPY N/A 9/27/2022    Procedure: EGD (ESOPHAGOGASTRODUODENOSCOPY);  Surgeon: Donnie Peterson MD;  Location: [facility] ENDO (2ND FLR);  Service: Endoscopy;  Laterality: N/A;    ESOPHAGOGASTRODUODENOSCOPY N/A 9/30/2022    Procedure: EGD (ESOPHAGOGASTRODUODENOSCOPY);  Surgeon: Joy Cabrera MD;  Location: [facility] ENDO (2ND FLR);  Service: Endoscopy;  Laterality: N/A;    ESOPHAGOGASTRODUODENOSCOPY W/ PEG N/A 1/4/2022    Procedure: EGD, WITH PEG TUBE INSERTION;  Surgeon: Anthony Calabrese MD;  [facility];  Service: General;  Laterality: N/A;    EYE SURGERY      Cataract bilateral    femoral stents      bilateral    FLAP PROCEDURE N/A 1/3/2022    Procedure: CREATION, FREE FLAP;  Surgeon: Naeem Smalls MD;  [facility];  Service: ENT;  Laterality: N/A;    FLAP PROCEDURE Right 1/4/2022    Procedure: CREATION, FREE FLAP;  Surgeon: Stacey Conde MD;  [facility];  Service: ENT;  Laterality: Right;  Ischemic start 1203  Ischemic stop 1353    GLOSSECTOMY N/A 1/4/2022    Procedure: GLOSSECTOMY;  Surgeon: Naeem  DINESH Smalls MD;  Location: [facility] OR [address] [place] [address];  Service: ENT;  Laterality: N/A;    RADICAL NECK DISSECTION Left 1/3/2022    Procedure: DISSECTION, NECK, RADICAL;  Surgeon: Naeem Smalls MD;  Location: [facility] OR [address] [place] [address];  Service: ENT;  Laterality: Left;    SKIN CANCER EXCISION      TRACHEOTOMY N/A 2022    Procedure: TRACHEOTOMY;  Surgeon: Naeem Smalls MD;  Location: [facility] OR [address] [place] [address];  Service: ENT;  Laterality: N/A;         Family History   History reviewed. No pertinent family history.        Social History   .  Social History     Socioeconomic History    Marital status:    Tobacco Use    Smoking status: Former     Packs/day: 2.00     Years: 40.00     Pack years: 80.00     Types: Cigarettes     Start date: 1963     Quit date: 2018     Years since quittin.6    Smokeless tobacco: Never    Tobacco comments:     3/3 ppd x 40 yrs. Currently 3-4 cigarettes daily .He is trying  to quit. Is using a Vapor cigarettes  2-3 x's a day.   Substance and Sexual Activity    Alcohol use: Not Currently     Alcohol/week: 3.0 standard drinks     Types: 3 Cans of beer per week     Comment: beer daily 3-4    Drug use: No    Sexual activity: Not Currently         Allergies   Review of patient's allergies indicates:  No Known Allergies        Physical Exam     Vitals:    22 1403   BP: (!) 92/57   Pulse: 75           There is no height or weight on file to calculate BMI.      General: AOx3, NAD   Respiratory:  Symmetric chest rise, normal effort  Oral cavity/oropharynx:  No worrisome lesions.  Flap well incorporated.  Neck:  Well-healed neck scars.  Size 6 uncuffed Shiley tracheostomy tube in place- exchanged for identical tube.  No LAD.  Face: House Brackmann I bilaterally.     NP: No lesions of posterior wall  OP: No lesions of posterior wall or BOT. BOT is soft to palpation  Larynx: No lesions of glottic or supraglottic larynx. Normal vocal fold mobility   HP: No lesions of pyriform sinuses or  postcricoid region  Mirror examination was performed.      Assessment/Plan  Problem List Items Addressed This Visit          Oncology    Squamous cell cancer of tongue - Primary     HARVEY.  RTC 6 weeks.

## 2022-12-26 PROBLEM — J96.20 ACUTE ON CHRONIC RESPIRATORY FAILURE: Status: RESOLVED | Noted: 2022-04-19 | Resolved: 2022-12-26

## 2022-12-26 PROBLEM — J18.9 PNEUMONIA: Status: RESOLVED | Noted: 2022-09-23 | Resolved: 2022-12-26

## 2022-12-28 ENCOUNTER — TELEPHONE (OUTPATIENT)
Dept: PULMONOLOGY | Facility: CLINIC | Age: 72
End: 2022-12-28

## 2022-12-28 DIAGNOSIS — R05.1 ACUTE COUGH: Primary | ICD-10-CM

## 2022-12-28 DIAGNOSIS — R05.8 COUGH PRODUCTIVE OF PURULENT SPUTUM: ICD-10-CM

## 2022-12-28 NOTE — TELEPHONE ENCOUNTER
----- Message from Fabi Snowden sent at 12/28/2022  2:25 PM CST -----  Regarding: Appt Access  Contact: Self [phone]  Pts spouse is calling stating pt has trachea and has been coughing up yellow/greenish secretions.  She thinks it's sinus related because she tested him for Covid and it was negative.  He has no issues breathing.  Pt would like to be seen.

## 2022-12-28 NOTE — TELEPHONE ENCOUNTER
Spoke with patient wife in regards to speaking with  regarding trachea...  Patient wife stated PCP informed her to reach out to  regarding treatment plan maybe antibiotics .  I advised MsMaliaJose Manuel I will forward this message to  to review and advise.  She verbalized she understands.

## 2022-12-29 ENCOUNTER — TELEPHONE (OUTPATIENT)
Dept: PULMONOLOGY | Facility: CLINIC | Age: 72
End: 2022-12-29
Payer: MEDICARE

## 2022-12-29 ENCOUNTER — HOSPITAL ENCOUNTER (OUTPATIENT)
Dept: RADIOLOGY | Facility: HOSPITAL | Age: 72
Discharge: HOME | End: 2022-12-29
Attending: INTERNAL MEDICINE
Payer: MEDICARE

## 2022-12-29 DIAGNOSIS — R05.1 ACUTE COUGH: ICD-10-CM

## 2022-12-29 DIAGNOSIS — R05.8 COUGH PRODUCTIVE OF PURULENT SPUTUM: ICD-10-CM

## 2022-12-29 PROCEDURE — 71046 X-RAY EXAM CHEST 2 VIEWS: CPT | Mod: TC,FY

## 2022-12-29 PROCEDURE — 71046 X-RAY EXAM CHEST 2 VIEWS: CPT | Mod: 26,,, | Performed by: RADIOLOGY

## 2022-12-29 PROCEDURE — 71046 XR CHEST PA AND LATERAL: ICD-10-PCS | Mod: 26,,, | Performed by: RADIOLOGY

## 2022-12-29 NOTE — TELEPHONE ENCOUNTER
Spoke with pt's wife on telephone yesterday & today. Pt is having cough productive of mucus via tracheostomy. Also nasal congestion. No fever, chest pain, or increase in dyspnea. CXR obtained- no signs of pneumonia. Procal obtained- normal. I suspect a viral URI. No treatment is indicated at this time. Provided reassurance.    Also of note- small pleural effusion is present on xray. This has been present on previous surveillance scans. No further evaluation is needed at this time. Continue monitoring per routine.     Salvador Wadsworth MD  [facility]

## 2023-01-11 NOTE — PROGRESS NOTES
[facility]  Follow Up Visit Note    Diagnosis:  Fareed Richard Jr. is a 72 y.o. 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 He is s/p adjuvant chemoradiation to 66 Gy in 33 fractions, completed 4/22/22.    Oncologic History:  He has a history of tobacco use ~100 pack years.   12/13/21: met with head and neck surgery, with 3.5cm ulcerated left anteriolateral tongue mass. No buccal or FOM lesions. 3cm, firm, minimally mobile left submandibular adenopathy. No facial weakness.  FNA: of left neck mass with metastatic squamous cell carcinoma  1/4/22: DL, Trach, total glossectomy, bilateral neck dissection of 1-4 and resection of left submandibular cervical skin  Op note: extensive submucosal involvement of the vast majority of the tongue. DL with no other lesions.  Lingual nerve was identified.  Was taken to the skull base and found to be negative for carcinoma  Path: 6 cm primary, with 29 mm DOI, with invasion of the left neck soft tissue, submandibular gland and cervical skin. - SM , extensive PNI. 3/68 LN+ (ITC in left lvl II-IV, 1/5LN+ in left IB, with SALINAS+  1/4/22: anterolateral thigh free flap and advancement flap. Post op stay was complicated by positive respiratory cultures requiring ABx. He was transferred to rehab.   2/28/22-4/22/22: 66 Gy in 33 fractions to the region of SALINAS, 60 Gy in 30 fractions to the operative bed, level I, and left II-IV and 54 Gy to left lvl V and right II-IV.       Interval History  The patient presents today for a regularly scheduled follow up visit.      No reports he is doing well. He has no new issues or complaints. He denies hemoptysis, ear pain, throat pain. He denies any new neck masses.  No facial numbness or weakness. Weight up 8 lbs. Remains on 4-5 L supplemental o2, following with pulmonology.      Review of Systems   ROS  As above    Social History:  Social History     Tobacco Use    Smoking status: Former     Packs/day: 2.00     Years: 40.00     Pack years: 80.00     Types: Cigarettes     Start date: 1963     Quit date: 2018     Years since quittin.7    Smokeless tobacco: Never    Tobacco comments:     3/3 ppd x 40 yrs. Currently 3-4 cigarettes daily .He is trying  to quit. Is using a Vapor cigarettes  2-3 x's a day.   Substance Use Topics    Alcohol use: Not Currently     Alcohol/week: 3.0 standard drinks     Types: 3 Cans of beer per week     Comment: beer daily 3-4    Drug use: No       Exam:  Vitals:    23 1414   BP: (!) 103/56   Pulse: 69   Resp: 19   SpO2: 96%   Weight: 65.3 kg (143 lb 15.4 oz)     Constitutional: Pleasant 72 y.o. male in no acute distress.  Well nourished.   HEENT: s/p total glossectomy, with intact flap. No OPX or OC lesions on direct exam. + trach  Lymph. No adenopathy but exam is limited by post surgergical and RT changes. No cutaneous lesions on the neck but has several skin lesions on face and ears  Lungs: No audible wheezing.  Normal effort.   Musculoskeletal: No gross MSK deformities.   GI: PEG tube without drainage, surrounding erythema or tenderness. + granulation tissue  Psych: Alert and oriented with appropriate mood and affect.  Neuro:  Grossly normal.    Data Review:  Information obtained via chart review and discussion with Mr. Richard and his wife.      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 He is s/p adjuvant chemoradiation to 66 Gy in 33 fractions, completed 22.  Doing well, weight up, with HARVEY on exam.  Subclinical hypothyroidism  ECOG: (2) Ambulatory and capable of self care, unable to carry out work activity, up and about > 50% or waking hours    Plan:  He is still NPO. Would like  to see speech.   Will plan to see him back with surveillance CT neck in 3 month  He is following with pulm and H&N surgery.   Will check TSH to monitor hypothyroidism   Skin lesions, recommend he sees dermatology. [place] [facility] wife will make an appt.     Zaki Brunson MD  Radiation Oncology

## 2023-01-12 ENCOUNTER — OFFICE VISIT (OUTPATIENT)
Dept: RADIATION ONCOLOGY | Facility: CLINIC | Age: 73
End: 2023-01-12
Payer: MEDICARE

## 2023-01-12 VITALS
DIASTOLIC BLOOD PRESSURE: 56 MMHG | BODY MASS INDEX: 21.89 KG/M2 | HEART RATE: 69 BPM | WEIGHT: 143.94 LBS | RESPIRATION RATE: 19 BRPM | SYSTOLIC BLOOD PRESSURE: 103 MMHG | OXYGEN SATURATION: 96 %

## 2023-01-12 DIAGNOSIS — C02.9 SQUAMOUS CELL CANCER OF TONGUE: Primary | ICD-10-CM

## 2023-01-12 DIAGNOSIS — Z92.3 HISTORY OF HEAD AND NECK RADIATION: ICD-10-CM

## 2023-01-12 DIAGNOSIS — Z90.49 S/P GLOSSECTOMY: ICD-10-CM

## 2023-01-12 DIAGNOSIS — E03.9 HYPOTHYROIDISM, UNSPECIFIED TYPE: ICD-10-CM

## 2023-01-12 PROCEDURE — 3008F BODY MASS INDEX DOCD: CPT | Mod: CPTII,S$GLB,, | Performed by: STUDENT IN AN ORGANIZED HEALTH CARE EDUCATION/TRAINING PROGRAM

## 2023-01-12 PROCEDURE — 99213 PR OFFICE/OUTPT VISIT, EST, LEVL III, 20-29 MIN: ICD-10-PCS | Mod: S$GLB,,, | Performed by: STUDENT IN AN ORGANIZED HEALTH CARE EDUCATION/TRAINING PROGRAM

## 2023-01-12 PROCEDURE — 3078F DIAST BP <80 MM HG: CPT | Mod: CPTII,S$GLB,, | Performed by: STUDENT IN AN ORGANIZED HEALTH CARE EDUCATION/TRAINING PROGRAM

## 2023-01-12 PROCEDURE — 1159F PR MEDICATION LIST DOCUMENTED IN MEDICAL RECORD: ICD-10-PCS | Mod: CPTII,S$GLB,, | Performed by: STUDENT IN AN ORGANIZED HEALTH CARE EDUCATION/TRAINING PROGRAM

## 2023-01-12 PROCEDURE — 3008F PR BODY MASS INDEX (BMI) DOCUMENTED: ICD-10-PCS | Mod: CPTII,S$GLB,, | Performed by: STUDENT IN AN ORGANIZED HEALTH CARE EDUCATION/TRAINING PROGRAM

## 2023-01-12 PROCEDURE — 3078F PR MOST RECENT DIASTOLIC BLOOD PRESSURE < 80 MM HG: ICD-10-PCS | Mod: CPTII,S$GLB,, | Performed by: STUDENT IN AN ORGANIZED HEALTH CARE EDUCATION/TRAINING PROGRAM

## 2023-01-12 PROCEDURE — 3074F SYST BP LT 130 MM HG: CPT | Mod: CPTII,S$GLB,, | Performed by: STUDENT IN AN ORGANIZED HEALTH CARE EDUCATION/TRAINING PROGRAM

## 2023-01-12 PROCEDURE — 1126F AMNT PAIN NOTED NONE PRSNT: CPT | Mod: CPTII,S$GLB,, | Performed by: STUDENT IN AN ORGANIZED HEALTH CARE EDUCATION/TRAINING PROGRAM

## 2023-01-12 PROCEDURE — 99999 PR PBB SHADOW E&M-EST. PATIENT-LVL IV: ICD-10-PCS | Mod: PBBFAC,,, | Performed by: STUDENT IN AN ORGANIZED HEALTH CARE EDUCATION/TRAINING PROGRAM

## 2023-01-12 PROCEDURE — 99213 OFFICE O/P EST LOW 20 MIN: CPT | Mod: S$GLB,,, | Performed by: STUDENT IN AN ORGANIZED HEALTH CARE EDUCATION/TRAINING PROGRAM

## 2023-01-12 PROCEDURE — 99999 PR PBB SHADOW E&M-EST. PATIENT-LVL IV: CPT | Mod: PBBFAC,,, | Performed by: STUDENT IN AN ORGANIZED HEALTH CARE EDUCATION/TRAINING PROGRAM

## 2023-01-12 PROCEDURE — 4010F PR ACE/ARB THEARPY RXD/TAKEN: ICD-10-PCS | Mod: CPTII,S$GLB,, | Performed by: STUDENT IN AN ORGANIZED HEALTH CARE EDUCATION/TRAINING PROGRAM

## 2023-01-12 PROCEDURE — 1159F MED LIST DOCD IN RCRD: CPT | Mod: CPTII,S$GLB,, | Performed by: STUDENT IN AN ORGANIZED HEALTH CARE EDUCATION/TRAINING PROGRAM

## 2023-01-12 PROCEDURE — 4010F ACE/ARB THERAPY RXD/TAKEN: CPT | Mod: CPTII,S$GLB,, | Performed by: STUDENT IN AN ORGANIZED HEALTH CARE EDUCATION/TRAINING PROGRAM

## 2023-01-12 PROCEDURE — 3288F FALL RISK ASSESSMENT DOCD: CPT | Mod: CPTII,S$GLB,, | Performed by: STUDENT IN AN ORGANIZED HEALTH CARE EDUCATION/TRAINING PROGRAM

## 2023-01-12 PROCEDURE — 1126F PR PAIN SEVERITY QUANTIFIED, NO PAIN PRESENT: ICD-10-PCS | Mod: CPTII,S$GLB,, | Performed by: STUDENT IN AN ORGANIZED HEALTH CARE EDUCATION/TRAINING PROGRAM

## 2023-01-12 PROCEDURE — 3074F PR MOST RECENT SYSTOLIC BLOOD PRESSURE < 130 MM HG: ICD-10-PCS | Mod: CPTII,S$GLB,, | Performed by: STUDENT IN AN ORGANIZED HEALTH CARE EDUCATION/TRAINING PROGRAM

## 2023-01-12 PROCEDURE — 3288F PR FALLS RISK ASSESSMENT DOCUMENTED: ICD-10-PCS | Mod: CPTII,S$GLB,, | Performed by: STUDENT IN AN ORGANIZED HEALTH CARE EDUCATION/TRAINING PROGRAM

## 2023-01-12 PROCEDURE — 1101F PT FALLS ASSESS-DOCD LE1/YR: CPT | Mod: CPTII,S$GLB,, | Performed by: STUDENT IN AN ORGANIZED HEALTH CARE EDUCATION/TRAINING PROGRAM

## 2023-01-12 PROCEDURE — 1101F PR PT FALLS ASSESS DOC 0-1 FALLS W/OUT INJ PAST YR: ICD-10-PCS | Mod: CPTII,S$GLB,, | Performed by: STUDENT IN AN ORGANIZED HEALTH CARE EDUCATION/TRAINING PROGRAM

## 2023-01-24 ENCOUNTER — TELEPHONE (OUTPATIENT)
Dept: SPEECH THERAPY | Facility: HOSPITAL | Age: 73
End: 2023-01-24
Payer: MEDICARE

## 2023-01-24 ENCOUNTER — LAB VISIT (OUTPATIENT)
Dept: LAB | Facility: HOSPITAL | Age: 73
End: 2023-01-24
Attending: STUDENT IN AN ORGANIZED HEALTH CARE EDUCATION/TRAINING PROGRAM
Payer: MEDICARE

## 2023-01-24 DIAGNOSIS — E03.9 HYPOTHYROIDISM, UNSPECIFIED TYPE: ICD-10-CM

## 2023-01-24 DIAGNOSIS — Z92.3 HISTORY OF HEAD AND NECK RADIATION: ICD-10-CM

## 2023-01-24 DIAGNOSIS — C02.9 SQUAMOUS CELL CANCER OF TONGUE: ICD-10-CM

## 2023-01-24 LAB
T4 FREE SERPL-MCNC: 0.9 NG/DL (ref 0.71–1.51)
TSH SERPL DL<=0.005 MIU/L-ACNC: 4.86 UIU/ML (ref 0.4–4)

## 2023-01-24 PROCEDURE — 84439 ASSAY OF FREE THYROXINE: CPT | Performed by: STUDENT IN AN ORGANIZED HEALTH CARE EDUCATION/TRAINING PROGRAM

## 2023-01-24 PROCEDURE — 36415 COLL VENOUS BLD VENIPUNCTURE: CPT | Performed by: STUDENT IN AN ORGANIZED HEALTH CARE EDUCATION/TRAINING PROGRAM

## 2023-01-24 PROCEDURE — 84443 ASSAY THYROID STIM HORMONE: CPT | Performed by: STUDENT IN AN ORGANIZED HEALTH CARE EDUCATION/TRAINING PROGRAM

## 2023-01-24 NOTE — TELEPHONE ENCOUNTER
Sw pt wife to schedule pt to see speech per Dr. Brunson. Pt not receiving HH but was working on swallowing. Wife would like repeat mbss I did inform her that this can be addressed once consulted with slp she may ask provider. Stated understanding

## 2023-01-24 NOTE — HPI
Mr. 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 Per wife he has had several days of increasing lethargy, increased work of breathing and secretions. Yesterday she became particularly concerned when he became slightly less responsive and interactive. Wife also notes increased yellow thick secretions from trach site. Upon EMS arrival he was on his home 5 L by trach collar and saturating 93% with significant wheezing. PT IS s/p chemoradiation treatment. Completed on 04/23. On 05/10 pt was able to tolerate PMSV. Pt remains NPO with bolus feeding per G tube. PM & R was consulted to evaluate pt for rehabilitation.       Functional History: Patient lives  with spouse in a single story   home with 5 steps to enter.  Prior to admission, pt was Independent w/o AD.  DME: cane, straight, walker, rolling, wheelchair, rollator, shower chair, hospital bed.     used

## 2023-01-25 ENCOUNTER — TELEPHONE (OUTPATIENT)
Dept: SPEECH THERAPY | Facility: HOSPITAL | Age: 73
End: 2023-01-25
Payer: MEDICARE

## 2023-01-31 ENCOUNTER — CLINICAL SUPPORT (OUTPATIENT)
Dept: SPEECH THERAPY | Facility: HOSPITAL | Age: 73
End: 2023-01-31
Attending: STUDENT IN AN ORGANIZED HEALTH CARE EDUCATION/TRAINING PROGRAM
Payer: MEDICARE

## 2023-01-31 DIAGNOSIS — R13.11 ORAL PHASE DYSPHAGIA: ICD-10-CM

## 2023-01-31 DIAGNOSIS — C02.9 SQUAMOUS CELL CANCER OF TONGUE: ICD-10-CM

## 2023-01-31 DIAGNOSIS — R13.12 DYSPHAGIA, OROPHARYNGEAL: Primary | ICD-10-CM

## 2023-01-31 DIAGNOSIS — Z90.49 S/P GLOSSECTOMY: ICD-10-CM

## 2023-01-31 DIAGNOSIS — Z92.3 HISTORY OF HEAD AND NECK RADIATION: ICD-10-CM

## 2023-01-31 PROCEDURE — 92610 EVALUATE SWALLOWING FUNCTION: CPT | Mod: GN | Performed by: SPEECH-LANGUAGE PATHOLOGIST

## 2023-01-31 NOTE — Clinical Note
Don, I'd like to repeat a MBSS on him to sort out if he can actually take anything right now by mouth and determine where to start in therapy.  Could you place the order, please?  Thank you!

## 2023-01-31 NOTE — PROGRESS NOTES
"REFERRING PHYSICIAN:  Zaki Brunson M.D., Radiation Oncologist  LENGTH OF VISIT:  45 minutes    REASON FOR REFERRAL:  Fareed Richard Jr., age 73, was referred by Dr. Zaki Brunson, radiation oncologist, for post-op and post-CRT assessment re: dysphagia.  He was accompanied by his wife Bridgett.    Mr. 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 He is s/p adjuvant chemoradiation to 66 Gy in 33 fractions along with Cisplatin, completed 4/22/22.    Following surgery, Mr. Richard was transferred to inpatient rehab and then discharged to home with home health.  He was followed by acute care SLP during his initial admission and during a re-admission in May 2022.  A MBSS was performed 1/12/22 about a week after his glossectomy.  Findings stated, "...severe oropharyngeal dysphagia characterized by reduced pharyngeal swallow strength s/p glossectomy and trach. Patient with silent aspiration of both thin and nectar-thick liquids. Cued throat clear/cough was moderately effective in clearing airway. Patient at significant risk of aspiration with all PO intake at this time."  It was recommended that he continue NPO with alternative means of nutrition.     During his re-admission, a bedside swallow assessment was performed 5/10/22.  During coughing after an ice chip trial, "...patient noted with what appeared to be tube feedings projecting from trach hub.  RN immediately called to patient's room per RN. MD notified.  Oxygen from 94% to 89%. Recovered with removal of PMSV."   Recommendation remained NPO with alternative means of nutrition delivery.    Mrs. Richard reported that he had worked with the home health SLP on swallowing and treatment was limited to facilitating swallows with use of cold " "lemon glycerin swabs and use of chin tuck swallows.  She denied that ice chips or other material was used for actual boluses.  While a repeat MBSS was discussed, it was never ordered.  She could not recall when HH services had ended.  There has been no outpatient ST services.    Mr. Richard reported that he would like to have "a cold beer" and "eat."  If possible, he would like to no longer need to use a PEG.    MEDICAL HISTORY:  Past Medical History:   Diagnosis Date    Cancer     skin to Rt forearm and face    COPD (chronic obstructive pulmonary disease)     Hyperlipidemia     Hypertension     Pseudoaneurysm      On supplemental O2 (via mask over his trach), ranging from 4-5L typically.  This was reported to depend on how long of an O2 line he was using (e.g., 5L with the longest of his lines).   He had the PMSV with him, but did not often use it.  He said this was b/c when it was in place, the O2 janelle pressed against it and caused desaturations.    Mrs. Richard reported that he had been weaned completely from supplemental oxygen while in rehab, but after Cisplatin began, he needed it again and has never been weaned off.    SURGICAL HISTORY:  Past Surgical History:   Procedure Laterality Date    ABDOMINAL SURGERY      stents placed in liver and large intestines, per patient    CAROTID STENT      COLONOSCOPY N/A 9/27/2022    Procedure: COLONOSCOPY;  Surgeon: Donnie Peterson MD;  Location: [facility] ([address] [place] [address]);  Service: Endoscopy;  Laterality: N/A;    COLONOSCOPY N/A 9/30/2022    Procedure: COLONOSCOPY;  Surgeon: Joy Cabrera MD;  Location: [facility] ([address] [place] [address]);  Service: Endoscopy;  Laterality: N/A;    CORONARY STENT PLACEMENT  01/2000    DISSECTION OF NECK Bilateral 1/4/2022    Procedure: DISSECTION, NECK;  Surgeon: Naeem Smalls MD;  [facility];  Service: ENT;  Laterality: Bilateral;    ESOPHAGOGASTRODUODENOSCOPY N/A 9/27/2022    Procedure: EGD (ESOPHAGOGASTRODUODENOSCOPY);  Surgeon: Donnie Peterson, " MD;  Location: [facility] ENDO (2ND FLR);  Service: Endoscopy;  Laterality: N/A;    ESOPHAGOGASTRODUODENOSCOPY N/A 9/30/2022    Procedure: EGD (ESOPHAGOGASTRODUODENOSCOPY);  Surgeon: Joy Cabrera MD;  Location: [facility] ENDO (2ND FLR);  Service: Endoscopy;  Laterality: N/A;    ESOPHAGOGASTRODUODENOSCOPY W/ PEG N/A 1/4/2022    Procedure: EGD, WITH PEG TUBE INSERTION;  Surgeon: Anthony Calabrese MD;  [facility];  Service: General;  Laterality: N/A;    EYE SURGERY      Cataract bilateral    femoral stents      bilateral    FLAP PROCEDURE N/A 1/3/2022    Procedure: CREATION, FREE FLAP;  Surgeon: Naeem Smalls MD;  [facility];  Service: ENT;  Laterality: N/A;    FLAP PROCEDURE Right 1/4/2022    Procedure: CREATION, FREE FLAP;  Surgeon: Stacey Conde MD;  [facility];  Service: ENT;  Laterality: Right;  Ischemic start 1203  Ischemic stop 1353    GLOSSECTOMY N/A 1/4/2022    Procedure: GLOSSECTOMY;  Surgeon: Naeem Smalls MD;  [facility];  Service: ENT;  Laterality: N/A;    RADICAL NECK DISSECTION Left 1/3/2022    Procedure: DISSECTION, NECK, RADICAL;  Surgeon: Naeem Smalls MD;  [facility];  Service: ENT;  Laterality: Left;    SKIN CANCER EXCISION      TRACHEOTOMY N/A 1/4/2022    Procedure: TRACHEOTOMY;  Surgeon: Naeem Smalls MD;  [facility];  Service: ENT;  Laterality: N/A;       ONCOLOGIC and TREATMENT HISTORY of problem for which patient is referred:  Oncology History   Squamous cell cancer of tongue   12/16/2021 Initial Diagnosis    Squamous cell cancer of tongue     12/16/2021 Tumor Conference       His case was discussed at the Multidisciplinary Head and Neck Team Planning Meeting.    Representatives from Medical Oncology, Radiation Oncology, Head and Neck Surgical Oncology, Psychosocial Oncology, and Speech and Language Pathology discussed the case with the following recommendations:    1) surgery  2)  work up lung nodule post op         1/2/2022 Cancer Staged    Staging form: Oral Cavity, AJCC 8th Edition  - Clinical stage from 1/2/2022: Stage NAFISA (cT2, cN2a, cM0)       1/4/2022 Cancer Staged    Staging form: Oral Cavity, AJCC 8th Edition  - Pathologic stage from 1/4/2022: Stage IVB (pT4a, pN3b, cM0)       2/28/2022 - 4/6/2022 Chemotherapy    Treatment Summary   Plan Name: OP HEAD NECK CISPLATIN WEEKLY + RADIOTHERAPY  Treatment Goal: Curative  Status: Inactive  Start Date: 2/28/2022  End Date: 4/6/2022  Provider: Christopher Jay MD  Chemotherapy: CISplatin (PLATINOL) 40 mg/m2 = 69 mg in sodium chloride 0.9% 500 mL chemo infusion, 40 mg/m2 = 69 mg, Intravenous, Clinic/HOD 1 time, 6 of 7 cycles  Administration: 69 mg (2/28/2022), 66 mg (3/7/2022), 69 mg (3/16/2022), 69 mg (3/23/2022), 69 mg (3/30/2022), 70 mg (4/6/2022)        Radiation Therapy    Treatment Summary  Course: C1 HN 2022  Treatment Site Ref. ID Energy Dose/Fx (Gy) #Fx Dose Correction (Gy) Total Dose (Gy) Start Date End Date Elapsed Days   IM H&N:1 PTV_66 6X 2 7 / 7 0 14 2/28/2022 3/10/2022 10   IM H&N2 PTV_66 6X 2 26 / 26 0 51.903 3/11/2022 4/22/2022 42      Secondary malignant neoplasm of cervical lymph node   2/16/2022 Initial Diagnosis    Secondary malignant neoplasm of cervical lymph node     2/28/2022 - 4/6/2022 Chemotherapy    Treatment Summary   Plan Name: OP HEAD NECK CISPLATIN WEEKLY + RADIOTHERAPY  Treatment Goal: Curative  Status: Inactive  Start Date: 2/28/2022  End Date: 4/6/2022  Provider: Christopher Jay MD  Chemotherapy: CISplatin (PLATINOL) 40 mg/m2 = 69 mg in sodium chloride 0.9% 500 mL chemo infusion, 40 mg/m2 = 69 mg, Intravenous, Clinic/HOD 1 time, 6 of 7 cycles  Administration: 69 mg (2/28/2022), 66 mg (3/7/2022), 69 mg (3/16/2022), 69 mg (3/23/2022), 69 mg (3/30/2022), 70 mg (4/6/2022)             Possible side effects related to treatment:  Xerostomia:   Negative.  C/o excess saliva and takes glycopyrrolate to help manage.  He manages  "his secretions via swallowing most of the time, but suctions his mouth about 3x/day.  Irritation:  n/a (NPO)  Changes in taste: n/a (NPO)  Radiation fibrosis: Denied  Lymphedema:  Denied    SWALLOWING HISTORY:  Regular diet pre-op.  NPO since 1/4/22 glossectomy.  See history above.  No swallow studies since January 2022.    FAMILY HISTORY:  No family history on file.    SOCIAL HISTORY:  Mr. Richard lives with his wife in [place].  She works a [employer].  He is retired.    Tobacco use:  Former smoker per EMR; 2 ppd x40 years.  Quit 2018.  ETOH use:  Not currently per EMR.    BEHAVIOR:  Mr. Richard was a very pleasant man who was seen with his wife.  His affect and social interaction were appropriate for situation and setting.  He was fully cooperative for the assessment. Results are considered indicative of his current levels of speech and swallowing functioning.    HEARING:  Subjectively, within normal limits.  He stated, "I can hear."    ASSESSMENT:  Oral Peripheral:     Mandible: Subjectively, within normal limits     Lips:  within normal limits  for rounding, spreading, and alternating as well as impounding air, smacking, and repetition of /p/.   Tongue:  Absent oral tongue.  When asked to move as if to perform protrusion, lateralization, elevation, there was no significant movement of the posterior-most aspect (that would suggest movement driven by BOT).  Review of imaging from January 2022 MBSS revealed movement of the BOT during swallowing attempts.    Velum and Hard Palate:  Subjectively, within normal limits    Reflexes:  Gag: deferred Swallow: +   Dentition:  Edentulous   Oropharynx: within normal limits     Speech:  % intelligible with distortions associated with absent oral tongue.       Swallowing:  Managing secretions and did not need oral suctioning during hour-long visit.  Deferred bedside eval; needs repeat MBSS.    Voice/Resonance:  PMVS present, but not in place initially.  Mr." Jose Manuel placed it and demonstrated good tolerance and good voice quality as well as readily audible speech.  Resonance was within normal limits subjectively.   To assess tolerance over time (and due to some concerns with desaturations in distant past), he participated in periodicly taking his saturation levels with his portal pulse oximeter.  Over a 30-minute perior, he remained 91 or higher (typically 95 to 99) with exception of one reading (85), but he immediately tried the other hand which read 99.      COUNSELING:  Advised that we need a repeat MBSS to determine what, if anything, he may currently take safely PO and where we may begin in therapy.  Advised that I do not expect him to be able to take foods that require mastication in the absence of an oral tongue, but, depending on pharyngeal swallow function, may be able to take liquids and/or pureed foods.  Whether or not he could ever completely be rid of PEG would depend on his efficiency and safety with PO intake of above.      Also showed them the PMSV adapter for O2 via Internet.  They were amenable to trying it; will ask Dr. Smalls for prescription/order.    They had questions about whether the trach could evel come out.  I was not able to readily discern the reason for its retention, so will ask Dr. Smalls.      IMPRESSIONS:   This 74 yo man appears to present with   Severe oropharyngeal dysphagia per status in the early post-op period and observation 4 months later; however, there has been no recent MBSS, so cannot with any certainty say what current functioning may be.  Peripheral dysarthria s/p glossectomy.  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  PEG dependent.  S/p adjuvant CRT (66 Gy with Cisplatin, completed 4/22/22; Dr. Brunson).  S/p total glossectomy with ALT free flap and advancement flap reconstruction and bilateral neck dissection  1/4/2022 (Dr. Smalls).  History [id number], group stage IVB  squamous cell carcinoma of the oral tongue (p16-)     RECOMMENDATIONS/PLAN OF CARE:  It is felt that Mr. Richard would benefit from  1.  Continuation of current NPO status pending MBSS.  2.  MBSS.  Will determined long and short-term goals based on findings.  3.  Trial of PMSV adapter for supplemental O2 delivery; will request prescription/order from Dr. Smalls.  4.  Continued f/u with Slim Feliciano, and Brina as directed.

## 2023-02-04 NOTE — PLAN OF CARE
IMPRESSIONS:   This 72 yo man appears to present with   Severe oropharyngeal dysphagia per status in the early post-op period and observation 4 months later; however, there has been no recent MBSS, so cannot with any certainty say what current functioning may be.  Peripheral dysarthria s/p glossectomy.  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  PEG dependent.  S/p adjuvant CRT (66 Gy with Cisplatin, completed 4/22/22; Dr. Brunson).  S/p total glossectomy with ALT free flap and advancement flap reconstruction and bilateral neck dissection  1/4/2022 (Dr. Smalls).  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 Richard would benefit from  1.  Continuation of current NPO status pending MBSS.  2.  MBSS.  Will determined long and short-term goals based on findings.  3.  Trial of PMSV adapter for supplemental O2 delivery; will request prescription/order from Dr. Smalls.  4.  Continued f/u with Slim Feliciano, and Brina as directed.

## 2023-02-06 ENCOUNTER — TELEPHONE (OUTPATIENT)
Dept: SPEECH THERAPY | Facility: HOSPITAL | Age: 73
End: 2023-02-06
Payer: MEDICARE

## 2023-02-06 NOTE — TELEPHONE ENCOUNTER
Sloan pt wife to inform of scheduled mbss 2/20@2:30pm. [facility] radiology main clinic.

## 2023-02-20 ENCOUNTER — HOSPITAL ENCOUNTER (OUTPATIENT)
Dept: RADIOLOGY | Facility: HOSPITAL | Age: 73
Discharge: HOME | End: 2023-02-20
Attending: STUDENT IN AN ORGANIZED HEALTH CARE EDUCATION/TRAINING PROGRAM
Payer: MEDICARE

## 2023-02-20 ENCOUNTER — CLINICAL SUPPORT (OUTPATIENT)
Dept: SPEECH THERAPY | Facility: HOSPITAL | Age: 73
End: 2023-02-20
Attending: STUDENT IN AN ORGANIZED HEALTH CARE EDUCATION/TRAINING PROGRAM
Payer: MEDICARE

## 2023-02-20 DIAGNOSIS — Z90.49 S/P GLOSSECTOMY: ICD-10-CM

## 2023-02-20 DIAGNOSIS — C02.9 SQUAMOUS CELL CANCER OF TONGUE: ICD-10-CM

## 2023-02-20 DIAGNOSIS — R13.12 DYSPHAGIA, OROPHARYNGEAL: Primary | ICD-10-CM

## 2023-02-20 DIAGNOSIS — R13.12 DYSPHAGIA, OROPHARYNGEAL: ICD-10-CM

## 2023-02-20 PROCEDURE — 74230 X-RAY XM SWLNG FUNCJ C+: CPT | Mod: TC

## 2023-02-20 PROCEDURE — A9698 NON-RAD CONTRAST MATERIALNOC: HCPCS | Performed by: STUDENT IN AN ORGANIZED HEALTH CARE EDUCATION/TRAINING PROGRAM

## 2023-02-20 PROCEDURE — 25500020 PHARM REV CODE 255: Performed by: STUDENT IN AN ORGANIZED HEALTH CARE EDUCATION/TRAINING PROGRAM

## 2023-02-20 PROCEDURE — 74230 FL MODIFIED BARIUM SWALLOW SPEECH STUDY: ICD-10-PCS | Mod: 26,,, | Performed by: STUDENT IN AN ORGANIZED HEALTH CARE EDUCATION/TRAINING PROGRAM

## 2023-02-20 PROCEDURE — 74230 X-RAY XM SWLNG FUNCJ C+: CPT | Mod: 26,,, | Performed by: STUDENT IN AN ORGANIZED HEALTH CARE EDUCATION/TRAINING PROGRAM

## 2023-02-20 PROCEDURE — 92611 MOTION FLUOROSCOPY/SWALLOW: CPT | Mod: GN | Performed by: SPEECH-LANGUAGE PATHOLOGIST

## 2023-02-20 RX ADMIN — BARIUM SULFATE 2 ML: 0.81 POWDER, FOR SUSPENSION ORAL at 02:02

## 2023-03-01 ENCOUNTER — TELEPHONE (OUTPATIENT)
Dept: SPEECH THERAPY | Facility: HOSPITAL | Age: 73
End: 2023-03-01
Payer: MEDICARE

## 2023-03-01 DIAGNOSIS — Z90.49 S/P GLOSSECTOMY: ICD-10-CM

## 2023-03-01 DIAGNOSIS — R13.12 DYSPHAGIA, OROPHARYNGEAL: Primary | ICD-10-CM

## 2023-03-01 NOTE — PROGRESS NOTES
"MODIFIED BARIUM SWALLOW STUDY    REASON FOR REFERRAL:  Fareed Richard Jr., age 73, was referred by Dr. Zaki Brunson,  radiation oncologist,  for a repeat Modified Barium Swallow Study to rule out aspiration, assess his overall swallowing function, and determine safest consistencies for oral intake.  He was accompanied by his wife.    Per history at his 1/31/23 visit:  Mr. 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 He is s/p adjuvant chemoradiation to 66 Gy in 33 fractions along with Cisplatin, completed 4/22/22.     Following surgery, Mr. Richard was transferred to inpatient rehab and then discharged to home with home health.  He was followed by acute care SLP during his initial admission and during a re-admission in May 2022.  A MBSS was performed 1/12/22 about a week after his glossectomy.  Findings stated, "...severe oropharyngeal dysphagia characterized by reduced pharyngeal swallow strength s/p glossectomy and trach. Patient with silent aspiration of both thin and nectar-thick liquids. Cued throat clear/cough was moderately effective in clearing airway. Patient at significant risk of aspiration with all PO intake at this time."  It was recommended that he continue NPO with alternative means of nutrition.                During his re-admission, a bedside swallow assessment was performed 5/10/22.  During coughing after an ice chip trial, "...patient noted with what appeared to be tube feedings projecting from trach hub.  RN immediately called to patient's room per RN. MD notified.  Oxygen from 94% to 89%. Recovered with removal of PMSV."   Recommendation remained NPO with alternative means of nutrition delivery.     Mrs. Richard reported that he had worked with the home health " "SLP on swallowing and treatment was limited to facilitating swallows with use of cold lemon glycerin swabs and use of chin tuck swallows.  She denied that ice chips or other material was used for actual boluses.  While a repeat MBSS was discussed, it was never ordered.  She could not recall when  services had ended.  There has been no outpatient ST services.     Mr. Richard reported that he would like to have "a cold beer" and "eat."  If possible, he would like to no longer need to use a PEG.    Today's study was requested to determine where we might begin in dysphagia therapy at this point and to determine whether there is any consistency he may safely take at this time.    MEDICAL HISTORY:  Past Medical History:   Diagnosis Date    Cancer     skin to Rt forearm and face    COPD (chronic obstructive pulmonary disease)     Hyperlipidemia     Hypertension     Pseudoaneurysm         SURGICAL HISTORY:  Past Surgical History:   Procedure Laterality Date    ABDOMINAL SURGERY      stents placed in liver and large intestines, per patient    CAROTID STENT      COLONOSCOPY N/A 9/27/2022    Procedure: COLONOSCOPY;  Surgeon: Donnie Peterson MD;  Location: [facility] ([address] [place] [address]);  Service: Endoscopy;  Laterality: N/A;    COLONOSCOPY N/A 9/30/2022    Procedure: COLONOSCOPY;  Surgeon: Joy Cabrera MD;  Location: [facility] ([address] [place] [address]);  Service: Endoscopy;  Laterality: N/A;    CORONARY STENT PLACEMENT  01/2000    DISSECTION OF NECK Bilateral 1/4/2022    Procedure: DISSECTION, NECK;  Surgeon: Naeem Smalls MD;  Location: [facility] OR [address] [place] [address];  Service: ENT;  Laterality: Bilateral;    ESOPHAGOGASTRODUODENOSCOPY N/A 9/27/2022    Procedure: EGD (ESOPHAGOGASTRODUODENOSCOPY);  Surgeon: Donnie Peterson MD;  Location: [facility] ([address] [place] [address]);  Service: Endoscopy;  Laterality: N/A;    ESOPHAGOGASTRODUODENOSCOPY N/A 9/30/2022    Procedure: EGD (ESOPHAGOGASTRODUODENOSCOPY);  Surgeon: Joy Cabrera MD;  Location: [facility] ([address] [place] [address]);  Service: " Endoscopy;  Laterality: N/A;    ESOPHAGOGASTRODUODENOSCOPY W/ PEG N/A 1/4/2022    Procedure: EGD, WITH PEG TUBE INSERTION;  Surgeon: Anthony Calabrese MD;  [facility];  Service: General;  Laterality: N/A;    EYE SURGERY      Cataract bilateral    femoral stents      bilateral    FLAP PROCEDURE N/A 1/3/2022    Procedure: CREATION, FREE FLAP;  Surgeon: Naeem Smalls MD;  Location: [facility] OR [facility];  Service: ENT;  Laterality: N/A;    FLAP PROCEDURE Right 1/4/2022    Procedure: CREATION, FREE FLAP;  Surgeon: Stacey Conde MD;  [facility];  Service: ENT;  Laterality: Right;  Ischemic start 1203  Ischemic stop 1353    GLOSSECTOMY N/A 1/4/2022    Procedure: GLOSSECTOMY;  Surgeon: Naeem Smalls MD;  [facility]: [facility];  Service: ENT;  Laterality: N/A;    RADICAL NECK DISSECTION Left 1/3/2022    Procedure: DISSECTION, NECK, RADICAL;  Surgeon: Naeem Smalls MD;  [facility];  Service: ENT;  Laterality: Left;    SKIN CANCER EXCISION      TRACHEOTOMY N/A 1/4/2022    Procedure: TRACHEOTOMY;  Surgeon: Naeem Smalls MD;  Location: [facility] OR [address] [place] [address];  Service: ENT;  Laterality: N/A;       SWALLOWING HISTORY:  As above.    FAMILY HISTORY:  No family history on file.     BEHAVIOR:  Fareed Richadr Jr. remained a very pleasant man who had normal affect and social interaction.  He was able to fully cooperate during the study.  Results of today's assessment were considered indicative of his current levels of swallowing functioning.      HEARING:  Subjectively, within normal limits.     ORAL PERIPHERAL:   Unchanged from his 1/31/23 assessment:  Oral Peripheral:                Mandible: Subjectively, within normal limits                Lips:  within normal limits  for rounding, spreading, and alternating as well as impounding air, smacking, and repetition of /p/.              Tongue:  Absent oral tongue.  When asked to move as if to perform protrusion,  lateralization, elevation, there was no significant movement of the posterior-most aspect (that would suggest movement driven by BOT).  [facility] revealed movement of the BOT during swallowing attempts.               Velum and Hard Palate:  Subjectively, within normal limits               Reflexes:  Gag: deferred        Swallow: +              Dentition:  Edentulous              Oropharynx: within normal limits     Voice quality was within normal limits with no wet quality before, during, or after the study. PMSV in place.    TEST FINDINGS:   Mr. Richard was seen in Radiology with the Radiologist for a Modified Barium Swallow Study.  He was seated  in a wheelchair  for a left lateral videofluoroscopic view.      Consistencies assessed using radiopaque barium contrast:  thin (1-mL boluses via syringe),   nectar  (1-mL boluses via syringe).    Strategies:  Cough and swallow -- effective cough to clear most of the tiny volume of penetrated/aspirated matieral    Phases:  Oral:  Mr. Richard was adequately obtain liquid with the syringe, although placement tended to be more anterior than desired.    He moved boluses through the oral cavity with head tilt back to engage gravity.  There was some pooling of liquids in the mouth.  Due to need to use gravity to progress boluses, thin liquid boluses moved into the pharynx more completely than did nectar-thick boluses.  Swallow reflex was triggered  after material reached the valleculae and/or laryngeal vestibule (in the case on thin liquids).  The valleculae had the appearance of being very shallow with a small remnant epiglottis.    Pharyngeal:  Thin liquid boluses moved through the pharyngeal phase with jamil laryngeal penetration and aspiration and no nasal regurgitation. Liquids were spilled into the open laryngeal vestibule a portion became aspirated with swallowing contractions.  With  nectar-thick liquids there was trace laryngeal penetration  during swallowing attempts and as the material appeared to become thinner with secretions.    - Delayed initiation  - Adequate soft palate elevation  - Reduced laryngeal elevation and anterior hyoid excursion  - Reduced tongue base retraction  -  Absent to near absent epiglottic inversion in the context of a remnant epilgottis  - Incomplete laryngeal vestibular closure  - Reduced pharyngeal stripping wave  - Adequate PE segment opening for the small volumes in study.  -   Trace  pharyngeal residue in BOT, valleculae, pyriforms, and hypopharynx      Cervical Esophageal:  Boluses entered the upper esophagus within normal limits.  No obstruction was noted.    Rosenbeck 8-point Penetration-Aspiration Scale:    Best:  4 - Material enters the airway, contacts the vocal folds, and is ejected from the airway. -- nectar-thick  Worst:  8 - Material enters the airway, passes below the vocal folds, and no effort is made to eject.  -- thin liquid bolus      IMPRESSIONS:   This 73 y.o. man appears to present with  1.  Severe-profound oropharyngeal dysphagia characterized by absent oral transit apart from gravity, spillage to a shallow-appearing valleculae (in the context of a remnant epiglottis) with overflow into the laryngeal vestibule resulting in laryngeal penetration and aspiration of portions of small thin liquid boluses and penetration of nectar-thick boluses.  In these tiny volumes there was no spontaneous cough, but there was an adequate volitional cough to expel most aspirated material.   In comparison to the images from his Jan 2022 MBSS, the BOT appeared to have markedly reduced ROM and strength.  2.  S/p adjuvant chemoradiation to 66 Gy along with Cisplatin, completed 4/22/22.  3.  S/p total glossectomy (-SM, + PNI) with ALT free flap and advancement flap reconstruction and bilateral neck dissection (1/4/2022.       RECOMMENDATIONS/PLAN OF CARE:   It is felt that Mr. Richard would benefit from  1.  Continued primary  "nutrition, hydration, and medication delivery via PEG.  2.  ST on a once weekly basis with a home program for 6-12 weeks to address dysphagia.  3.  Continued f/u with Ashley Brunson, Slim, and medical oncology as directed.    Long-term goals:  Mr. Richard will be able to consume at least one consistency for pleasure and/or a portion of his hydration or nutritional needs with no signs/symptoms of aspiration.      Short-term objectives:  Mr. Richard will  1.  Demonstrate execution of swallowing exercises with % accuracy (with modifications as necessary).  A.  Breath-hold maneuver  B.  Mendelsohn maneuver ("kick" modification)  C.  Falsetto voice/elevation of larynx  D.  Tongue base retraction  E.  Chin tuck against resistance  F.  Effortful swallow  2.  Execute a supraglottic swallow with thin liquids with % consistency and limited/no s/s concerning for airway threat.   A.  1-mL   B.  3-mL   C.  5-mL  3.  Perform home program 3-4x/day by report.  4.  Advance diet to include thin pureed foods.      "

## 2023-03-01 NOTE — PLAN OF CARE
"  IMPRESSIONS:   This 73 y.o. man appears to present with  1.  Severe-profound oropharyngeal dysphagia characterized by absent oral transit apart from gravity, spillage to a shallow-appearing valleculae (in the context of a remnant epiglottis) with overflow into the laryngeal vestibule resulting in laryngeal penetration and aspiration of portions of small thin liquid boluses and penetration of nectar-thick boluses.  In these tiny volumes there was no spontaneous cough, but there was an adequate volitional cough to expel most aspirated material.   In comparison to the images from his Jan 2022 MBSS, the BOT appeared to have markedly reduced ROM and strength.  2.  S/p adjuvant chemoradiation to 66 Gy along with Cisplatin, completed 4/22/22.  3.  S/p total glossectomy (-SM, + PNI) with ALT free flap and advancement flap reconstruction and bilateral neck dissection (1/4/2022.       RECOMMENDATIONS/PLAN OF CARE:   It is felt that Mr. Richard would benefit from  1.  Continued primary nutrition, hydration, and medication delivery via PEG.  2.  ST on a once weekly basis with a home program for 6-12 weeks to address dysphagia.  3.  Continued f/u with Slim Feliciano, and medical oncology as directed.    Long-term goals:  Mr. Richard will be able to consume at least one consistency for pleasure and/or a portion of his hydration or nutritional needs with no signs/symptoms of aspiration.      Short-term objectives:  Mr. Richard will  1.  Demonstrate execution of swallowing exercises with % accuracy (with modifications as necessary).  A.  Breath-hold maneuver  B.  Mendelsohn maneuver ("kick" modification)  C.  Falsetto voice/elevation of larynx  D.  Tongue base retraction  E.  Chin tuck against resistance  F.  Effortful swallow  2.  Execute a supraglottic swallow with thin liquids with % consistency and limited/no s/s concerning for airway threat.   A.  1-mL   B.  3-mL   C.  5-mL  3.  Perform home program 3-4x/day by " report.  4.  Advance diet to include thin pureed foods.

## 2023-03-02 ENCOUNTER — CLINICAL SUPPORT (OUTPATIENT)
Dept: SPEECH THERAPY | Facility: HOSPITAL | Age: 73
End: 2023-03-02
Attending: STUDENT IN AN ORGANIZED HEALTH CARE EDUCATION/TRAINING PROGRAM
Payer: MEDICARE

## 2023-03-02 DIAGNOSIS — Z92.3 HISTORY OF HEAD AND NECK RADIATION: ICD-10-CM

## 2023-03-02 DIAGNOSIS — Z90.49 S/P GLOSSECTOMY: ICD-10-CM

## 2023-03-02 DIAGNOSIS — Z92.21 HISTORY OF CHEMOTHERAPY: ICD-10-CM

## 2023-03-02 DIAGNOSIS — R13.12 DYSPHAGIA, OROPHARYNGEAL: Primary | ICD-10-CM

## 2023-03-02 PROCEDURE — 92526 ORAL FUNCTION THERAPY: CPT | Mod: GN | Performed by: SPEECH-LANGUAGE PATHOLOGIST

## 2023-03-02 NOTE — PROGRESS NOTES
"DIAGNOSIS:  Dysphagia, oropharyngeal (R13.12), s/p glossectomy (Z90.49), History head and neck radiation (Z92.3), history chemotherapy (Z92.21)  REFERRING DOCTOR:  Zaki Brunson M.D., Radiation Oncologist  LENGTH OF SESSION:  45 mintues    REASON FOR VISIT:  Dysphagia therapy    INTERVAL HISTORY:  MBSS 2/20/23; please see note    INTERVENTION:  Reviewed most recent and initial MBSS's, particularly to show difference in BOT movement.    Short-term objectives:  Mr. Richard will  1.  Demonstrate execution of swallowing exercises with % accuracy (with modifications as necessary).  A.  Breath-hold maneuver  B.  Mendelsohn maneuver ("kick" modification)  C.  Falsetto voice/elevation of larynx  D.  Tongue base retraction  E.  Chin tuck against resistance  F.  Effortful swallow  Introduced and reviewed each.  Able to perform or approximate each.  Re: Mendelsohn, concerned primarily with trying to engage BOT regardless of current movement.  Instructed to perform these 3x/day, 10 reps each.    2.  Execute a supraglottic swallow with thin liquids with % consistency and limited/no s/s concerning for airway threat.              A.  1-mL -- Utilized syringe delivery to R RMT area with head in neutral position during delivery and use of head tilt back afterwards to advance bolus into pharynx. Paired with SGS.  Elicited some complete swallows.  Limited to 5 trials today and in home program (1x/day for now).               B.  3-mL              C.  5-mL  3.  Perform home program 3-4x/day by report.   As above.  4.  Advance diet to include thin pureed foods.     Deferred.       IMPRESSIONS:   This 73 y.o. man appears to present with  1.  Per MBSS of 2/20/23, severe-profound oropharyngeal dysphagia characterized by absent oral transit apart from gravity, spillage to a shallow-appearing valleculae (in the context of a remnant epiglottis) with overflow into the laryngeal vestibule resulting in laryngeal penetration and " aspiration of portions of small thin liquid boluses and penetration of nectar-thick boluses.  In these tiny volumes there was no spontaneous cough, but there was an adequate volitional cough to expel most aspirated material.              In comparison to the images from his Jan 2022 MBSS, the BOT appeared to have markedly reduced ROM and strength.  2.  S/p adjuvant chemoradiation to 66 Gy along with Cisplatin, completed 4/22/22.  3.  S/p total glossectomy (-SM, + PNI) with ALT free flap and advancement flap reconstruction and bilateral neck dissection (1/4/2022.         RECOMMENDATIONS/PLAN OF CARE:   It is felt that Mr. Richard would benefit from  1.  Continued primary nutrition, hydration, and medication delivery via PEG.  2.  ST on a once weekly basis with a home program for 5-11 weeks to address dysphagia.  3.  Continued f/u with Slim Feliciano, and medical oncology as directed.     Long-term goals:  Mr. Richard will be able to consume at least one consistency for pleasure and/or a portion of his hydration or nutritional needs with no signs/symptoms of aspiration.

## 2023-03-02 NOTE — PATIENT INSTRUCTIONS
Do the exercises 3x/day. Use a toothette to moisten your mouth as needed to aid dry swallows.    1x/day practice with 1 mL of water for 5 swallows.  Do this.  Keep your head level while expressing the water out of the syringe.  Hold your breath.  Place the syringe in the rear corner of your mouth and express the water.  Tilt your head back to help progress the water into your throat.  Swallow, then cough out, and swallow again.  Repeat until the water is gone.

## 2023-03-09 ENCOUNTER — CLINICAL SUPPORT (OUTPATIENT)
Dept: SPEECH THERAPY | Facility: HOSPITAL | Age: 73
End: 2023-03-09
Payer: MEDICARE

## 2023-03-09 DIAGNOSIS — R13.12 DYSPHAGIA, OROPHARYNGEAL: Primary | ICD-10-CM

## 2023-03-09 DIAGNOSIS — C02.9 SQUAMOUS CELL CANCER OF TONGUE: ICD-10-CM

## 2023-03-09 DIAGNOSIS — Z90.49 S/P GLOSSECTOMY: ICD-10-CM

## 2023-03-09 DIAGNOSIS — Z92.3 HISTORY OF HEAD AND NECK RADIATION: ICD-10-CM

## 2023-03-09 DIAGNOSIS — Z92.21 HISTORY OF CHEMOTHERAPY: ICD-10-CM

## 2023-03-09 PROCEDURE — 92526 ORAL FUNCTION THERAPY: CPT | Mod: GN | Performed by: SPEECH-LANGUAGE PATHOLOGIST

## 2023-03-09 NOTE — PROGRESS NOTES
"DIAGNOSIS:  Dysphagia, oropharyngeal (R13.12), s/p glossectomy (Z90.49), History head and neck radiation (Z92.3), history chemotherapy (Z92.21)  REFERRING DOCTOR:  Zaki Brunson M.D., Radiation Oncologist  LENGTH OF SESSION:  45 mintues    REASON FOR VISIT:  Dysphagia therapy    INTERVAL HISTORY:  Did not do home program daily, but most days.  Sometimes 3x/day, sometimes not.  Did not do 1-mL swallows.    INTERVENTION:  Short-term objectives:  Mr. Richard will  1.  Demonstrate execution of swallowing exercises with % accuracy (with modifications as necessary).  A.  Breath-hold maneuver  B.  Mendelsohn maneuver ("kick" modification)  C.  Falsetto voice/elevation of larynx  D.  Tongue base retraction  E.  Chin tuck against resistance  F.  Effortful swallow  Able to perform or approximate each except CTAR caused coughing.  Discontinued it for now.  Re: Mendelsohn, concerned primarily with trying to engage BOT regardless of current movement.  Instructed to perform these 3x/day, 10 reps each.    2.  Execute a supraglottic swallow with thin liquids with % consistency and limited/no s/s concerning for airway threat.              A.  1-mL -- Utilized syringe delivery to R RMT area with head in neutral position during delivery and use of head tilt back afterwards to advance bolus into pharynx. Paired with SGS.  Elicited some complete swallows. Mix of indicators; by 7th swallow began exhibiting wet voice.  Limited to 8 trials today and 5 in home program (1x/day for now).               B.  3-mL              C.  5-mL  3.  Perform home program 3-4x/day by report.   As above.  4.  Advance diet to include thin pureed foods.     Deferred.       IMPRESSIONS:   This 73 y.o. man appears to present with  1.  Per MBSS of 2/20/23, severe-profound oropharyngeal dysphagia characterized by absent oral transit apart from gravity, spillage to a shallow-appearing valleculae (in the context of a remnant epiglottis) with overflow into " the laryngeal vestibule resulting in laryngeal penetration and aspiration of portions of small thin liquid boluses and penetration of nectar-thick boluses.  In these tiny volumes there was no spontaneous cough, but there was an adequate volitional cough to expel most aspirated material.              In comparison to the images from his Jan 2022 MBSS, the BOT appeared to have markedly reduced ROM and strength.  2.  S/p adjuvant chemoradiation to 66 Gy along with Cisplatin, completed 4/22/22.  3.  S/p total glossectomy (-SM, + PNI) with ALT free flap and advancement flap reconstruction and bilateral neck dissection (1/4/2022.         RECOMMENDATIONS/PLAN OF CARE:   It is felt that Mr. Richard would benefit from  1.  Continued primary nutrition, hydration, and medication delivery via PEG.  2.  ST on a once weekly basis with a home program for 4-10 weeks to address dysphagia.  3.  Continued f/u with Slim Feliciano, and medical oncology as directed.     Long-term goals:  Mr. Richard will be able to consume at least one consistency for pleasure and/or a portion of his hydration or nutritional needs with no signs/symptoms of aspiration.

## 2023-03-16 ENCOUNTER — CLINICAL SUPPORT (OUTPATIENT)
Dept: SPEECH THERAPY | Facility: HOSPITAL | Age: 73
End: 2023-03-16
Payer: MEDICARE

## 2023-03-16 ENCOUNTER — OFFICE VISIT (OUTPATIENT)
Dept: OTOLARYNGOLOGY | Facility: CLINIC | Age: 73
End: 2023-03-16
Payer: MEDICARE

## 2023-03-16 VITALS
BODY MASS INDEX: 21.98 KG/M2 | HEIGHT: 68 IN | WEIGHT: 145 LBS | SYSTOLIC BLOOD PRESSURE: 102 MMHG | HEART RATE: 75 BPM | DIASTOLIC BLOOD PRESSURE: 68 MMHG

## 2023-03-16 DIAGNOSIS — Z92.21 HISTORY OF CHEMOTHERAPY: ICD-10-CM

## 2023-03-16 DIAGNOSIS — Z92.3 HISTORY OF HEAD AND NECK RADIATION: ICD-10-CM

## 2023-03-16 DIAGNOSIS — C02.9 SQUAMOUS CELL CANCER OF TONGUE: ICD-10-CM

## 2023-03-16 DIAGNOSIS — Z90.49 S/P GLOSSECTOMY: ICD-10-CM

## 2023-03-16 DIAGNOSIS — C02.9 SQUAMOUS CELL CANCER OF TONGUE: Primary | ICD-10-CM

## 2023-03-16 DIAGNOSIS — R13.12 DYSPHAGIA, OROPHARYNGEAL: Primary | ICD-10-CM

## 2023-03-16 PROCEDURE — 31615 TRCHEOBRNCHSC EST TRACHS INC: CPT | Mod: S$GLB,,, | Performed by: OTOLARYNGOLOGY

## 2023-03-16 PROCEDURE — 1160F RVW MEDS BY RX/DR IN RCRD: CPT | Mod: CPTII,S$GLB,, | Performed by: OTOLARYNGOLOGY

## 2023-03-16 PROCEDURE — 99999 PR PBB SHADOW E&M-EST. PATIENT-LVL II: ICD-10-PCS | Mod: PBBFAC,,, | Performed by: OTOLARYNGOLOGY

## 2023-03-16 PROCEDURE — 3078F DIAST BP <80 MM HG: CPT | Mod: CPTII,S$GLB,, | Performed by: OTOLARYNGOLOGY

## 2023-03-16 PROCEDURE — 4010F PR ACE/ARB THEARPY RXD/TAKEN: ICD-10-PCS | Mod: CPTII,S$GLB,, | Performed by: OTOLARYNGOLOGY

## 2023-03-16 PROCEDURE — 1160F PR REVIEW ALL MEDS BY PRESCRIBER/CLIN PHARMACIST DOCUMENTED: ICD-10-PCS | Mod: CPTII,S$GLB,, | Performed by: OTOLARYNGOLOGY

## 2023-03-16 PROCEDURE — 4010F ACE/ARB THERAPY RXD/TAKEN: CPT | Mod: CPTII,S$GLB,, | Performed by: OTOLARYNGOLOGY

## 2023-03-16 PROCEDURE — 99213 OFFICE O/P EST LOW 20 MIN: CPT | Mod: 25,S$GLB,, | Performed by: OTOLARYNGOLOGY

## 2023-03-16 PROCEDURE — 1159F MED LIST DOCD IN RCRD: CPT | Mod: CPTII,S$GLB,, | Performed by: OTOLARYNGOLOGY

## 2023-03-16 PROCEDURE — 3074F SYST BP LT 130 MM HG: CPT | Mod: CPTII,S$GLB,, | Performed by: OTOLARYNGOLOGY

## 2023-03-16 PROCEDURE — 3078F PR MOST RECENT DIASTOLIC BLOOD PRESSURE < 80 MM HG: ICD-10-PCS | Mod: CPTII,S$GLB,, | Performed by: OTOLARYNGOLOGY

## 2023-03-16 PROCEDURE — 31615 PR SCOPE THRU TRACHEOSTOMY: ICD-10-PCS | Mod: S$GLB,,, | Performed by: OTOLARYNGOLOGY

## 2023-03-16 PROCEDURE — 99999 PR PBB SHADOW E&M-EST. PATIENT-LVL II: CPT | Mod: PBBFAC,,, | Performed by: OTOLARYNGOLOGY

## 2023-03-16 PROCEDURE — 99213 PR OFFICE/OUTPT VISIT, EST, LEVL III, 20-29 MIN: ICD-10-PCS | Mod: 25,S$GLB,, | Performed by: OTOLARYNGOLOGY

## 2023-03-16 PROCEDURE — 3008F BODY MASS INDEX DOCD: CPT | Mod: CPTII,S$GLB,, | Performed by: OTOLARYNGOLOGY

## 2023-03-16 PROCEDURE — 3008F PR BODY MASS INDEX (BMI) DOCUMENTED: ICD-10-PCS | Mod: CPTII,S$GLB,, | Performed by: OTOLARYNGOLOGY

## 2023-03-16 PROCEDURE — 3074F PR MOST RECENT SYSTOLIC BLOOD PRESSURE < 130 MM HG: ICD-10-PCS | Mod: CPTII,S$GLB,, | Performed by: OTOLARYNGOLOGY

## 2023-03-16 PROCEDURE — 1159F PR MEDICATION LIST DOCUMENTED IN MEDICAL RECORD: ICD-10-PCS | Mod: CPTII,S$GLB,, | Performed by: OTOLARYNGOLOGY

## 2023-03-16 NOTE — PROGRESS NOTES
After seeing Dr. Smalls,  Jose Manuel verbalized feeling poorly and down and did not want to participate in therapy today.  Rescheduled.

## 2023-03-18 ENCOUNTER — HOSPITAL ENCOUNTER (INPATIENT)
Facility: HOSPITAL | Age: 73
LOS: 42 days | Discharge: HOME HEALTH | DRG: 981 | End: 2023-04-30
Attending: EMERGENCY MEDICINE | Admitting: HOSPITALIST
Payer: MEDICARE

## 2023-03-18 DIAGNOSIS — I21.4 NSTEMI (NON-ST ELEVATED MYOCARDIAL INFARCTION): ICD-10-CM

## 2023-03-18 DIAGNOSIS — E86.0 DEHYDRATION: ICD-10-CM

## 2023-03-18 DIAGNOSIS — Z71.89 ACP (ADVANCE CARE PLANNING): ICD-10-CM

## 2023-03-18 DIAGNOSIS — I25.119 CHEST PAIN DUE TO CAD: ICD-10-CM

## 2023-03-18 DIAGNOSIS — R07.9 CHEST PAIN: ICD-10-CM

## 2023-03-18 DIAGNOSIS — J96.21 ACUTE ON CHRONIC RESPIRATORY FAILURE WITH HYPOXIA AND HYPERCAPNIA: ICD-10-CM

## 2023-03-18 DIAGNOSIS — J44.1 COPD EXACERBATION: ICD-10-CM

## 2023-03-18 DIAGNOSIS — I25.10 CAD S/P PERCUTANEOUS CORONARY ANGIOPLASTY: ICD-10-CM

## 2023-03-18 DIAGNOSIS — J96.22 ACUTE ON CHRONIC RESPIRATORY FAILURE WITH HYPOXIA AND HYPERCAPNIA: ICD-10-CM

## 2023-03-18 DIAGNOSIS — I95.9 HYPOTENSION: ICD-10-CM

## 2023-03-18 DIAGNOSIS — D64.9 SEVERE ANEMIA: ICD-10-CM

## 2023-03-18 DIAGNOSIS — Z98.61 CAD S/P PERCUTANEOUS CORONARY ANGIOPLASTY: ICD-10-CM

## 2023-03-18 DIAGNOSIS — K92.2 GASTROINTESTINAL HEMORRHAGE, UNSPECIFIED GASTROINTESTINAL HEMORRHAGE TYPE: ICD-10-CM

## 2023-03-18 DIAGNOSIS — R04.2 HEMOPTYSIS: ICD-10-CM

## 2023-03-18 DIAGNOSIS — J96.01 ACUTE RESPIRATORY FAILURE WITH HYPOXIA: ICD-10-CM

## 2023-03-18 DIAGNOSIS — Z93.0 TRACHEOSTOMY DEPENDENCE: Primary | ICD-10-CM

## 2023-03-18 DIAGNOSIS — I95.9 HYPOTENSION, UNSPECIFIED HYPOTENSION TYPE: ICD-10-CM

## 2023-03-18 DIAGNOSIS — I25.10 CORONARY ARTERY DISEASE INVOLVING NATIVE CORONARY ARTERY OF NATIVE HEART WITHOUT ANGINA PECTORIS: ICD-10-CM

## 2023-03-18 DIAGNOSIS — C02.9 SQUAMOUS CELL CANCER OF TONGUE: ICD-10-CM

## 2023-03-18 LAB
ABO + RH BLD: NORMAL
ALBUMIN SERPL BCP-MCNC: 2.9 G/DL (ref 3.5–5.2)
ALP SERPL-CCNC: 90 U/L (ref 55–135)
ALT SERPL W/O P-5'-P-CCNC: 16 U/L (ref 10–44)
ANION GAP SERPL CALC-SCNC: 9 MMOL/L (ref 8–16)
AST SERPL-CCNC: 16 U/L (ref 10–40)
BACTERIA #/AREA URNS HPF: ABNORMAL /HPF
BASOPHILS # BLD AUTO: 0.03 K/UL (ref 0–0.2)
BASOPHILS NFR BLD: 0.3 % (ref 0–1.9)
BILIRUB SERPL-MCNC: 0.2 MG/DL (ref 0.1–1)
BILIRUB UR QL STRIP: NEGATIVE
BLD GP AB SCN CELLS X3 SERPL QL: NORMAL
BNP SERPL-MCNC: 136 PG/ML (ref 0–99)
BUN SERPL-MCNC: 50 MG/DL (ref 8–23)
CALCIUM SERPL-MCNC: 9.2 MG/DL (ref 8.7–10.5)
CHLORIDE SERPL-SCNC: 101 MMOL/L (ref 95–110)
CLARITY UR: CLEAR
CO2 SERPL-SCNC: 32 MMOL/L (ref 23–29)
COLOR UR: YELLOW
CREAT SERPL-MCNC: 0.7 MG/DL (ref 0.5–1.4)
DIFFERENTIAL METHOD: ABNORMAL
EOSINOPHIL # BLD AUTO: 0.2 K/UL (ref 0–0.5)
EOSINOPHIL NFR BLD: 2.2 % (ref 0–8)
ERYTHROCYTE [DISTWIDTH] IN BLOOD BY AUTOMATED COUNT: 14 % (ref 11.5–14.5)
EST. GFR  (NO RACE VARIABLE): >60 ML/MIN/1.73 M^2
FERRITIN SERPL-MCNC: 84 NG/ML (ref 20–300)
GLUCOSE SERPL-MCNC: 137 MG/DL (ref 70–110)
GLUCOSE UR QL STRIP: NEGATIVE
HCT VFR BLD AUTO: 24.5 % (ref 40–54)
HGB BLD-MCNC: 7.6 G/DL (ref 14–18)
HGB UR QL STRIP: ABNORMAL
HYALINE CASTS #/AREA URNS LPF: 3 /LPF
IMM GRANULOCYTES # BLD AUTO: 0.04 K/UL (ref 0–0.04)
IMM GRANULOCYTES NFR BLD AUTO: 0.4 % (ref 0–0.5)
INR PPP: 1 (ref 0.8–1.2)
IRON SERPL-MCNC: 57 UG/DL (ref 45–160)
KETONES UR QL STRIP: NEGATIVE
LEUKOCYTE ESTERASE UR QL STRIP: ABNORMAL
LYMPHOCYTES # BLD AUTO: 0.8 K/UL (ref 1–4.8)
LYMPHOCYTES NFR BLD: 7 % (ref 18–48)
MCH RBC QN AUTO: 28.3 PG (ref 27–31)
MCHC RBC AUTO-ENTMCNC: 31 G/DL (ref 32–36)
MCV RBC AUTO: 91 FL (ref 82–98)
MICROSCOPIC COMMENT: ABNORMAL
MONOCYTES # BLD AUTO: 1.1 K/UL (ref 0.3–1)
MONOCYTES NFR BLD: 9.9 % (ref 4–15)
NEUTROPHILS # BLD AUTO: 8.5 K/UL (ref 1.8–7.7)
NEUTROPHILS NFR BLD: 80.2 % (ref 38–73)
NITRITE UR QL STRIP: NEGATIVE
NRBC BLD-RTO: 0 /100 WBC
PH UR STRIP: 7 [PH] (ref 5–8)
PLATELET # BLD AUTO: 221 K/UL (ref 150–450)
PMV BLD AUTO: 9.2 FL (ref 9.2–12.9)
POTASSIUM SERPL-SCNC: 4.6 MMOL/L (ref 3.5–5.1)
PROCALCITONIN SERPL IA-MCNC: 0.06 NG/ML
PROT SERPL-MCNC: 6.7 G/DL (ref 6–8.4)
PROT UR QL STRIP: NEGATIVE
PROTHROMBIN TIME: 10.6 SEC (ref 9–12.5)
RBC # BLD AUTO: 2.69 M/UL (ref 4.6–6.2)
RBC #/AREA URNS HPF: 0 /HPF (ref 0–4)
SATURATED IRON: 19 % (ref 20–50)
SODIUM SERPL-SCNC: 142 MMOL/L (ref 136–145)
SP GR UR STRIP: 1.02 (ref 1–1.03)
TOTAL IRON BINDING CAPACITY: 306 UG/DL (ref 250–450)
TRANSFERRIN SERPL-MCNC: 207 MG/DL (ref 200–375)
TROPONIN I SERPL DL<=0.01 NG/ML-MCNC: 0.01 NG/ML (ref 0–0.03)
URN SPEC COLLECT METH UR: ABNORMAL
UROBILINOGEN UR STRIP-ACNC: NEGATIVE EU/DL
WBC # BLD AUTO: 10.64 K/UL (ref 3.9–12.7)
WBC #/AREA URNS HPF: 15 /HPF (ref 0–5)
WBC CLUMPS URNS QL MICRO: ABNORMAL

## 2023-03-18 PROCEDURE — G0378 HOSPITAL OBSERVATION PER HR: HCPCS

## 2023-03-18 PROCEDURE — 84145 PROCALCITONIN (PCT): CPT | Performed by: EMERGENCY MEDICINE

## 2023-03-18 PROCEDURE — 25000003 PHARM REV CODE 250: Performed by: EMERGENCY MEDICINE

## 2023-03-18 PROCEDURE — 27100171 HC OXYGEN HIGH FLOW UP TO 24 HOURS

## 2023-03-18 PROCEDURE — 63600175 PHARM REV CODE 636 W HCPCS: Performed by: EMERGENCY MEDICINE

## 2023-03-18 PROCEDURE — 25000242 PHARM REV CODE 250 ALT 637 W/ HCPCS: Performed by: EMERGENCY MEDICINE

## 2023-03-18 PROCEDURE — 82728 ASSAY OF FERRITIN: CPT | Performed by: EMERGENCY MEDICINE

## 2023-03-18 PROCEDURE — 93010 ELECTROCARDIOGRAM REPORT: CPT | Mod: ,,, | Performed by: INTERNAL MEDICINE

## 2023-03-18 PROCEDURE — 96375 TX/PRO/DX INJ NEW DRUG ADDON: CPT

## 2023-03-18 PROCEDURE — 99285 EMERGENCY DEPT VISIT HI MDM: CPT | Mod: 25

## 2023-03-18 PROCEDURE — 86920 COMPATIBILITY TEST SPIN: CPT | Performed by: EMERGENCY MEDICINE

## 2023-03-18 PROCEDURE — 87186 SC STD MICRODIL/AGAR DIL: CPT | Performed by: EMERGENCY MEDICINE

## 2023-03-18 PROCEDURE — 27000221 HC OXYGEN, UP TO 24 HOURS

## 2023-03-18 PROCEDURE — 87077 CULTURE AEROBIC IDENTIFY: CPT | Performed by: EMERGENCY MEDICINE

## 2023-03-18 PROCEDURE — 87088 URINE BACTERIA CULTURE: CPT | Performed by: EMERGENCY MEDICINE

## 2023-03-18 PROCEDURE — 80053 COMPREHEN METABOLIC PANEL: CPT | Performed by: EMERGENCY MEDICINE

## 2023-03-18 PROCEDURE — 82607 VITAMIN B-12: CPT | Performed by: EMERGENCY MEDICINE

## 2023-03-18 PROCEDURE — 84484 ASSAY OF TROPONIN QUANT: CPT | Performed by: EMERGENCY MEDICINE

## 2023-03-18 PROCEDURE — P9016 RBC LEUKOCYTES REDUCED: HCPCS | Performed by: EMERGENCY MEDICINE

## 2023-03-18 PROCEDURE — 83880 ASSAY OF NATRIURETIC PEPTIDE: CPT | Performed by: EMERGENCY MEDICINE

## 2023-03-18 PROCEDURE — 96374 THER/PROPH/DIAG INJ IV PUSH: CPT

## 2023-03-18 PROCEDURE — 86900 BLOOD TYPING SEROLOGIC ABO: CPT | Performed by: EMERGENCY MEDICINE

## 2023-03-18 PROCEDURE — 86920 COMPATIBILITY TEST SPIN: CPT | Performed by: HOSPITALIST

## 2023-03-18 PROCEDURE — 85610 PROTHROMBIN TIME: CPT | Performed by: EMERGENCY MEDICINE

## 2023-03-18 PROCEDURE — 81000 URINALYSIS NONAUTO W/SCOPE: CPT | Performed by: EMERGENCY MEDICINE

## 2023-03-18 PROCEDURE — 82746 ASSAY OF FOLIC ACID SERUM: CPT | Performed by: EMERGENCY MEDICINE

## 2023-03-18 PROCEDURE — 84466 ASSAY OF TRANSFERRIN: CPT | Performed by: EMERGENCY MEDICINE

## 2023-03-18 PROCEDURE — 99900035 HC TECH TIME PER 15 MIN (STAT)

## 2023-03-18 PROCEDURE — 93005 ELECTROCARDIOGRAM TRACING: CPT

## 2023-03-18 PROCEDURE — 85025 COMPLETE CBC W/AUTO DIFF WBC: CPT | Performed by: EMERGENCY MEDICINE

## 2023-03-18 PROCEDURE — 96361 HYDRATE IV INFUSION ADD-ON: CPT

## 2023-03-18 PROCEDURE — 25000242 PHARM REV CODE 250 ALT 637 W/ HCPCS: Performed by: HOSPITALIST

## 2023-03-18 PROCEDURE — 36430 TRANSFUSION BLD/BLD COMPNT: CPT

## 2023-03-18 PROCEDURE — 94640 AIRWAY INHALATION TREATMENT: CPT

## 2023-03-18 PROCEDURE — 94761 N-INVAS EAR/PLS OXIMETRY MLT: CPT

## 2023-03-18 PROCEDURE — 87086 URINE CULTURE/COLONY COUNT: CPT | Performed by: EMERGENCY MEDICINE

## 2023-03-18 PROCEDURE — 99900026 HC AIRWAY MAINTENANCE (STAT)

## 2023-03-18 PROCEDURE — 25000003 PHARM REV CODE 250: Performed by: PHYSICIAN ASSISTANT

## 2023-03-18 PROCEDURE — 31720 CLEARANCE OF AIRWAYS: CPT

## 2023-03-18 PROCEDURE — 93010 EKG 12-LEAD: ICD-10-PCS | Mod: ,,, | Performed by: INTERNAL MEDICINE

## 2023-03-18 RX ORDER — HYDROCODONE BITARTRATE AND ACETAMINOPHEN 500; 5 MG/1; MG/1
TABLET ORAL
Status: DISCONTINUED | OUTPATIENT
Start: 2023-03-18 | End: 2023-03-20

## 2023-03-18 RX ORDER — ATORVASTATIN CALCIUM 10 MG/1
20 TABLET, FILM COATED ORAL DAILY
Status: DISCONTINUED | OUTPATIENT
Start: 2023-03-19 | End: 2023-03-23

## 2023-03-18 RX ORDER — ONDANSETRON 2 MG/ML
4 INJECTION INTRAMUSCULAR; INTRAVENOUS
Status: COMPLETED | OUTPATIENT
Start: 2023-03-18 | End: 2023-03-18

## 2023-03-18 RX ORDER — SODIUM CHLORIDE 0.9 % (FLUSH) 0.9 %
10 SYRINGE (ML) INJECTION EVERY 8 HOURS
Status: DISCONTINUED | OUTPATIENT
Start: 2023-03-18 | End: 2023-03-18

## 2023-03-18 RX ORDER — ALBUTEROL SULFATE 2.5 MG/.5ML
2.5 SOLUTION RESPIRATORY (INHALATION)
Status: COMPLETED | OUTPATIENT
Start: 2023-03-18 | End: 2023-03-18

## 2023-03-18 RX ORDER — TALC
6 POWDER (GRAM) TOPICAL NIGHTLY
Status: DISCONTINUED | OUTPATIENT
Start: 2023-03-18 | End: 2023-03-20

## 2023-03-18 RX ORDER — NALOXONE HCL 0.4 MG/ML
0.02 VIAL (ML) INJECTION
Status: DISCONTINUED | OUTPATIENT
Start: 2023-03-18 | End: 2023-04-30 | Stop reason: HOSPADM

## 2023-03-18 RX ORDER — MORPHINE SULFATE 4 MG/ML
3 INJECTION, SOLUTION INTRAMUSCULAR; INTRAVENOUS
Status: COMPLETED | OUTPATIENT
Start: 2023-03-18 | End: 2023-03-18

## 2023-03-18 RX ORDER — SODIUM CHLORIDE 0.9 % (FLUSH) 0.9 %
10 SYRINGE (ML) INJECTION
Status: DISCONTINUED | OUTPATIENT
Start: 2023-03-18 | End: 2023-03-21

## 2023-03-18 RX ORDER — AMLODIPINE BESYLATE 10 MG/1
10 TABLET ORAL
Status: ON HOLD | COMMUNITY
Start: 2023-01-01 | End: 2023-04-30 | Stop reason: HOSPADM

## 2023-03-18 RX ORDER — CLOPIDOGREL BISULFATE 75 MG/1
75 TABLET ORAL DAILY
COMMUNITY
End: 2023-05-10 | Stop reason: SDUPTHER

## 2023-03-18 RX ORDER — GUAIFENESIN 100 MG/5ML
400 SOLUTION ORAL EVERY 4 HOURS PRN
Status: DISCONTINUED | OUTPATIENT
Start: 2023-03-18 | End: 2023-04-30 | Stop reason: HOSPADM

## 2023-03-18 RX ORDER — ONDANSETRON 4 MG/1
4 TABLET, ORALLY DISINTEGRATING ORAL
Status: DISCONTINUED | OUTPATIENT
Start: 2023-03-18 | End: 2023-03-18

## 2023-03-18 RX ORDER — GLYCOPYRROLATE 1 MG/1
1 TABLET ORAL 3 TIMES DAILY
Status: DISCONTINUED | OUTPATIENT
Start: 2023-03-18 | End: 2023-04-13

## 2023-03-18 RX ORDER — IPRATROPIUM BROMIDE AND ALBUTEROL SULFATE 2.5; .5 MG/3ML; MG/3ML
3 SOLUTION RESPIRATORY (INHALATION) EVERY 4 HOURS PRN
Status: DISCONTINUED | OUTPATIENT
Start: 2023-03-18 | End: 2023-04-04

## 2023-03-18 RX ADMIN — Medication 6 MG: at 08:03

## 2023-03-18 RX ADMIN — ONDANSETRON 4 MG: 2 INJECTION INTRAMUSCULAR; INTRAVENOUS at 02:03

## 2023-03-18 RX ADMIN — SODIUM CHLORIDE 1000 ML: 9 INJECTION, SOLUTION INTRAVENOUS at 02:03

## 2023-03-18 RX ADMIN — SODIUM CHLORIDE 250 ML: 9 INJECTION, SOLUTION INTRAVENOUS at 06:03

## 2023-03-18 RX ADMIN — MORPHINE SULFATE 3 MG: 4 INJECTION, SOLUTION INTRAMUSCULAR; INTRAVENOUS at 02:03

## 2023-03-18 RX ADMIN — SODIUM CHLORIDE, POTASSIUM CHLORIDE, SODIUM LACTATE AND CALCIUM CHLORIDE 1000 ML: 600; 310; 30; 20 INJECTION, SOLUTION INTRAVENOUS at 01:03

## 2023-03-18 RX ADMIN — ALBUTEROL SULFATE 2.5 MG: 2.5 SOLUTION RESPIRATORY (INHALATION) at 11:03

## 2023-03-18 RX ADMIN — GLYCOPYRROLATE 1 MG: 1 TABLET ORAL at 08:03

## 2023-03-18 RX ADMIN — IPRATROPIUM BROMIDE AND ALBUTEROL SULFATE 3 ML: 2.5; .5 SOLUTION RESPIRATORY (INHALATION) at 05:03

## 2023-03-18 NOTE — Clinical Note
The wire was inserted into the LFA Wire advanced to Aorta.  Sheath inserted OTW.  Wire removed.    .

## 2023-03-18 NOTE — Clinical Note
200 ml of contrast were injected throughout the case. 100 mL of contrast was the total wasted during the case. 300 mL was the total amount used during the case.

## 2023-03-18 NOTE — PHARMACY MED REC
"Admission Medication History     The home medication history was taken by Priscila Castillo.    You may go to "Admission" then "Reconcile Home Medications" tabs to review and/or act upon these items.     The home medication list has been updated by the Pharmacy department.   Please read ALL comments highlighted in yellow.   Please address this information as you see fit.    Feel free to contact us if you have any questions or require assistance.      The medications listed below were removed from the home medication list. Please reorder if appropriate:  Patient reports no longer taking the following medication(s):  Dulcolax  Oxycodone  Glycolax  Ocean Spray    Medications listed below were obtained from: Patient/family and Analytic software- [employer] and added to home medication:   Amlodipine   Clopidogrel   Multivitamins    The medication reconciliation was completed by the patient's bedside.      Priscila Castillo  [phone]                        .        "

## 2023-03-18 NOTE — Clinical Note
The wire was inserted into the RFAWire removed.  Unable to pass wire via RFA access.  RFA access aborted..

## 2023-03-18 NOTE — ED PROVIDER NOTES
Encounter Date: 3/18/2023    SCRIBE #1 NOTE: I, Radha Ruby, am scribing for, and in the presence of,  Kodi Fuller MD. I have scribed the following portions of the note - Other sections scribed: HPI, ROS.     History     Chief Complaint   Patient presents with    Hemoptysis     Ems called to 72yo male that wife noticed was having blood y sputum in his trach on Wednesday. Went thursdsay to have a trach change and the dr was unale to scope his mouth above the trach due to him gagging but did change the trach. Continued to have the pink sputum until 0300 today and it had bright red blood from trach. Denied any pain or SOB     CC: hemoptysis    HPI: This is a 73 y.o.male patient, with a PMHx of Cancer ( chemotherapy and radiation completed), COPD, Hyperlipidemia, HTN and Pseudoaneurysm, presenting to the ED via EMS for further evaluation of bloody sputum in tracheostomy tube beginning 3 days ago. Patient's wife reports patient was seen by Naeem Smalls MD for a tracheostomy tube change on 3/16/23. Spouse states MD tried to scope patient's mouth but was unable to because the patient gagged. Spouse states MD was able to scope trachea and everything was normal without any tears, blood or abnormalities. Spouse reports patient had bright red blood from tach this morning at 3:00 AM. Spouse reports patient had an episode of pink tinged secretions in the past but denies ever having bright red blood. Spouse states she suctioned the patient many times. Patient reports associated symptoms of increased shortness of breath and generalized weakness. Patient denies cough, chest pain, fever, chills, abdominal pain, nausea, vomiting, diarrhea, dysuria, headaches, congestion, sore throat, arm or leg trouble, eye pain, ear pain, rash, or other associated symptoms. No prior Tx. No alleviating or aggravating factors. No known drug allergies.        The history is provided by the patient, the spouse and the EMS personnel. No   was used.   Review of patient's allergies indicates:  No Known Allergies  Past Medical History:   Diagnosis Date    Cancer     skin to Rt forearm and face    COPD (chronic obstructive pulmonary disease)     Hyperlipidemia     Hypertension     Pseudoaneurysm      Past Surgical History:   Procedure Laterality Date    ABDOMINAL SURGERY      stents placed in liver and large intestines, per patient    CAROTID STENT      COLONOSCOPY N/A 9/27/2022    Procedure: COLONOSCOPY;  Surgeon: Donnie Peterson MD;  Location: [facility] ENDO (2ND FLR);  Service: Endoscopy;  Laterality: N/A;    COLONOSCOPY N/A 9/30/2022    Procedure: COLONOSCOPY;  Surgeon: Joy Cabrera MD;  Location: [facility] ENDO (2ND FLR);  Service: Endoscopy;  Laterality: N/A;    CORONARY STENT PLACEMENT  01/2000    DISSECTION OF NECK Bilateral 1/4/2022    Procedure: DISSECTION, NECK;  Surgeon: Naeem Smalls MD;  [facility];  Service: ENT;  Laterality: Bilateral;    ESOPHAGOGASTRODUODENOSCOPY N/A 9/27/2022    Procedure: EGD (ESOPHAGOGASTRODUODENOSCOPY);  Surgeon: Donnie Peterson MD;  Location: [facility] ENDO (2ND FLR);  Service: Endoscopy;  Laterality: N/A;    ESOPHAGOGASTRODUODENOSCOPY N/A 9/30/2022    Procedure: EGD (ESOPHAGOGASTRODUODENOSCOPY);  Surgeon: Joy Cabrera MD;  Location: [facility] ENDO (2ND FLR);  Service: Endoscopy;  Laterality: N/A;    ESOPHAGOGASTRODUODENOSCOPY W/ PEG N/A 1/4/2022    Procedure: EGD, WITH PEG TUBE INSERTION;  Surgeon: Anthony Calabrese MD;  [facility];  Service: General;  Laterality: N/A;    EYE SURGERY      Cataract bilateral    femoral stents      bilateral    FLAP PROCEDURE N/A 1/3/2022    Procedure: CREATION, FREE FLAP;  Surgeon: Naeem Smalls MD;  [facility];  Service: ENT;  Laterality: N/A;    FLAP PROCEDURE Right 1/4/2022    Procedure: CREATION, FREE FLAP;  Surgeon: Stacey Conde MD;  [facility];  Service: ENT;  Laterality: Right;  Ischemic start  1203  Ischemic stop 1353    GLOSSECTOMY N/A 2022    Procedure: GLOSSECTOMY;  Surgeon: Naeem Smalls MD;  Location: [facility] OR [address] [place] [address];  Service: ENT;  Laterality: N/A;    RADICAL NECK DISSECTION Left 1/3/2022    Procedure: DISSECTION, NECK, RADICAL;  Surgeon: Naeem Smalls MD;  Location: [facility] OR [address] [place] [address];  Service: ENT;  Laterality: Left;    SKIN CANCER EXCISION      TRACHEOTOMY N/A 2022    Procedure: TRACHEOTOMY;  Surgeon: Naeem Smalls MD;  Location: [facility] OR [address] [place] [address];  Service: ENT;  Laterality: N/A;     No family history on file.  Social History     Tobacco Use    Smoking status: Former     Packs/day: 2.00     Years: 40.00     Pack years: 80.00     Types: Cigarettes     Start date: 1963     Quit date: 2018     Years since quittin.9    Smokeless tobacco: Never    Tobacco comments:     3/3 ppd x 40 yrs. Currently 3-4 cigarettes daily .He is trying  to quit. Is using a Vapor cigarettes  2-3 x's a day.   Substance Use Topics    Alcohol use: Not Currently     Alcohol/week: 3.0 standard drinks     Types: 3 Cans of beer per week     Comment: beer daily 3-4    Drug use: No     Review of Systems   Constitutional:  Negative for chills, diaphoresis and fever.   HENT:  Negative for ear pain and sore throat.    Eyes:  Negative for pain.   Respiratory:  Positive for shortness of breath. Negative for cough.         (+) hemoptysis   Cardiovascular:  Negative for chest pain.   Gastrointestinal:  Negative for abdominal pain, diarrhea, nausea and vomiting.   Genitourinary:  Negative for dysuria.   Musculoskeletal:  Negative for back pain.        (-) Arm or leg trouble.    Skin:  Negative for rash.   Neurological:  Positive for weakness (generalized). Negative for headaches.   Psychiatric/Behavioral:  Negative for confusion.      Physical Exam     Initial Vitals [23 1028]   BP Pulse Resp Temp SpO2   (!) 106/59 88 20 97.8 °F (36.6 °C) (!) 94 %      MAP       --         Physical Exam  The  patient was examined specifically for the following:   General:No significant distress, Good color, Warm and dry. Head and neck:Scalp atraumatic, Neck supple. Neurological:Appropriate conversation, Gross motor deficits. Eyes:Conjugate gaze, Clear corneas. ENT: No epistaxis. Cardiac: Regular rate and rhythm, Grossly normal heart tones. Pulmonary: Wheezing, Rales. Gastrointestinal: Abdominal tenderness, Abdominal distention. Musculoskeletal: Extremity deformity, Apparent pain with range of motion of the joints. Skin: Rash.   The findings on examination were normal except for the following:  Patient's oxygen saturations are 94% on his home oxygen cannula.  The patient is afebrile.  The lungs are clear and free of wheezing rales rubs or rhonchi heart tones are normal.  The patient has regular rate and rhythm.  The abdomen is nontender.  There is no guarding rebound mass or distention.  Extremities are nontender there is no apparent pain with range of motion any joints.  The patient has no rash.  Patient has a gastrostomy tube in place.  He has a tracheostomy tube in place.  There is pink bloody foamy secretions coming from the tracheostomy.  Patient's blood pressure is 106/59.  ED Course   Critical Care    Date/Time: 3/18/2023 3:37 PM  Performed by: Kodi Fuller MD  Authorized by: Kodi Fuller MD   Direct patient critical care time: 22 minutes  Additional history critical care time: 11 minutes  Ordering / reviewing critical care time: 11 minutes  Documentation critical care time: 11 minutes  Consulting other physicians critical care time: 5 minutes  Total critical care time (exclusive of procedural time) : 60 minutes  Critical care time was exclusive of separately billable procedures and treating other patients and teaching time.  Critical care was necessary to treat or prevent imminent or life-threatening deterioration of the following conditions: metabolic crisis and circulatory failure.  Critical care was  time spent personally by me on the following activities: discussions with primary provider, development of treatment plan with patient or surrogate, evaluation of patient's response to treatment, examination of patient, obtaining history from patient or surrogate, ordering and performing treatments and interventions, ordering and review of laboratory studies, ordering and review of radiographic studies, pulse oximetry, re-evaluation of patient's condition and review of old charts.      Labs Reviewed   COMPREHENSIVE METABOLIC PANEL - Abnormal; Notable for the following components:       Result Value    CO2 32 (*)     Glucose 137 (*)     BUN 50 (*)     Albumin 2.9 (*)     All other components within normal limits   CBC W/ AUTO DIFFERENTIAL - Abnormal; Notable for the following components:    RBC 2.69 (*)     Hemoglobin 7.6 (*)     Hematocrit 24.5 (*)     MCHC 31.0 (*)     Gran # (ANC) 8.5 (*)     Lymph # 0.8 (*)     Mono # 1.1 (*)     Gran % 80.2 (*)     Lymph % 7.0 (*)     All other components within normal limits   B-TYPE NATRIURETIC PEPTIDE - Abnormal; Notable for the following components:     (*)     All other components within normal limits   IRON AND TIBC - Abnormal; Notable for the following components:    Saturated Iron 19 (*)     All other components within normal limits   URINALYSIS, REFLEX TO URINE CULTURE - Abnormal; Notable for the following components:    Occult Blood UA Trace (*)     Leukocytes, UA 1+ (*)     All other components within normal limits    Narrative:     Specimen Source->Urine   URINALYSIS MICROSCOPIC - Abnormal; Notable for the following components:    WBC, UA 15 (*)     WBC Clumps, UA Occasional (*)     Hyaline Casts, UA 3 (*)     All other components within normal limits    Narrative:     Specimen Source->Urine   CULTURE, URINE   PROTIME-INR   TROPONIN I   PROCALCITONIN   VITAMIN B12   FOLATE   TYPE & SCREEN   PREPARE RBC SOFT     EKG Readings: (Independently Interpreted)   This  "patient is in a normal sinus rhythm heart rate of 87.  There are no significant ST segment or T-wave changes.  This patient may have an incomplete right bundle branch block.  The axis is normal.  There is no evidence of acute myocardial infarction malignant arrhythmia.  This is doctor Fuller dictating an I independently interpreted this EKG.     Imaging Results              X-Ray Chest AP Portable (Final result)  Result time 03/18/23 12:01:13      Final result by Mariano Soto MD (03/18/23 12:01:13)                   Impression:      No detrimental change when compared with 12/29/2022.      Electronically signed by: Mariano Soto MD  Date:    03/18/2023  Time:    12:01               Narrative:    EXAMINATION:  XR CHEST AP PORTABLE    CLINICAL HISTORY:  Provided history is "chest pain;  ".    TECHNIQUE:  One view of the chest.    COMPARISON:  12/29/2022.    FINDINGS:  Tracheostomy tube is present with the tip overlying the tracheal air column between the clavicular heads and sherine.  Cardiac silhouette is borderline enlarged and similar to prior study.  Atherosclerotic calcifications overlie the aortic arch.  Probable pulmonary emphysema.  Coarsened interstitial lung markings with bibasilar subsegmental atelectasis.  Small left and trace right pleural effusions, similar to the prior study.  No pneumothorax.  Aeration is overall similar when compared with 12/29/2022. Probable percutaneous gastrostomy tube overlies the left upper quadrant of the abdomen.                                       Medications   0.9%  NaCl infusion (for blood administration) (has no administration in time range)   albuterol sulfate nebulizer solution 2.5 mg (2.5 mg Nebulization Given 3/18/23 1100)   lactated ringers bolus 1,000 mL (0 mLs Intravenous Stopped 3/18/23 1433)   sodium chloride 0.9% bolus 1,000 mL 1,000 mL (1,000 mLs Intravenous New Bag 3/18/23 1411)   sodium chloride 0.9% bolus 1,000 mL 1,000 mL (1,000 mLs Intravenous New " Bag 3/18/23 1411)   morphine injection 3 mg (3 mg Intravenous Given 3/18/23 1430)   ondansetron injection 4 mg (4 mg Intravenous Given 3/18/23 1432)     Medical Decision Making:   History:   Old Medical Records: I decided to obtain old medical records.  Independently Interpreted Test(s):   I have ordered and independently interpreted EKG Reading(s) - see prior notes  Clinical Tests:   Lab Tests: Reviewed and Ordered  Radiological Study: Reviewed and Ordered  Medical Tests: Reviewed and Ordered     Medical decision making: Given the above this patient presents to the emergency room with hemoptysis.  The patient has chronic shortness of breath.  He is fed through a gastrostomy tube.  He has a history of a tongue resection for cancer.  He has a tracheostomy in place.  He arrives for evaluation for hemoptysis.  This is been going on for 2 days since his trach change.  He had a scope by ENT and there were no significant findings.  The patient had some hemoptysis this morning.  He was suctioned then irrigated and hemoptysis resolved.  He was discovered to be anemic with a hemoglobin is 7.6.  He was also discovered to be dehydrated with BUN of 50.  He was treated with multiple L of crystalloid.  His blood pressure recovered.  I believe this patient will require a transfusion.  He will be admitted the hospital medicine for observation.  He has some pain across the top of his shoulders presumably from breathing hard.  He was treated for pain in the emergency room I find no evidence pneumonia on chest x-ray or other causes of hemoptysis.  EKG fails to reveal arrhythmia or injury.  The BNP is low at 136.  I doubt congestive heart failure as a cause of hemoptysis.  Patient's troponin is normal.  I against myocardial infarction and producing heart failure pulmonary edema and hemoptysis.  PT INR normal.  There is no coagulopathy.  CBC reveals a normal platelet count.  Thrombocytopenia is not complicating this hemoptysis case.  I  will admit this patient for rehydration, transfusion, observation and suctioning for copious purulent secretions.       Scribe Attestation:   Scribe #1: I performed the above scribed service and the documentation accurately describes the services I performed. I attest to the accuracy of the note.                   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  All medical record  entries made by the scribe are at my direction and in my presence.  Signed, Dr. Jonny Fuller MD  03/18/23 0885

## 2023-03-18 NOTE — SUBJECTIVE & OBJECTIVE
Past Medical History:   Diagnosis Date    Cancer     skin to Rt forearm and face    COPD (chronic obstructive pulmonary disease)     Hyperlipidemia     Hypertension     Pseudoaneurysm        Past Surgical History:   Procedure Laterality Date    ABDOMINAL SURGERY      stents placed in liver and large intestines, per patient    CAROTID STENT      COLONOSCOPY N/A 9/27/2022    Procedure: COLONOSCOPY;  Surgeon: Donnie Peterson MD;  Location: [facility] ENDO (2ND FLR);  Service: Endoscopy;  Laterality: N/A;    COLONOSCOPY N/A 9/30/2022    Procedure: COLONOSCOPY;  Surgeon: Joy Cabrera MD;  Location: [facility] ENDO (2ND FLR);  Service: Endoscopy;  Laterality: N/A;    CORONARY STENT PLACEMENT  01/2000    DISSECTION OF NECK Bilateral 1/4/2022    Procedure: DISSECTION, NECK;  Surgeon: Naeem Smalls MD;  [facility];  Service: ENT;  Laterality: Bilateral;    ESOPHAGOGASTRODUODENOSCOPY N/A 9/27/2022    Procedure: EGD (ESOPHAGOGASTRODUODENOSCOPY);  Surgeon: Donnie Peterson MD;  Location: [facility] ENDO (2ND FLR);  Service: Endoscopy;  Laterality: N/A;    ESOPHAGOGASTRODUODENOSCOPY N/A 9/30/2022    Procedure: EGD (ESOPHAGOGASTRODUODENOSCOPY);  Surgeon: Joy Cabrera MD;  Location: [facility] ENDO (2ND FLR);  Service: Endoscopy;  Laterality: N/A;    ESOPHAGOGASTRODUODENOSCOPY W/ PEG N/A 1/4/2022    Procedure: EGD, WITH PEG TUBE INSERTION;  Surgeon: Anthony Calabrese MD;  [facility];  Service: General;  Laterality: N/A;    EYE SURGERY      Cataract bilateral    femoral stents      bilateral    FLAP PROCEDURE N/A 1/3/2022    Procedure: CREATION, FREE FLAP;  Surgeon: Naeem Smalls MD;  [facility];  Service: ENT;  Laterality: N/A;    FLAP PROCEDURE Right 1/4/2022    Procedure: CREATION, FREE FLAP;  Surgeon: Stacey Conde MD;  [facility];  Service: ENT;  Laterality: Right;  Ischemic start 1203  Ischemic stop 1353    GLOSSECTOMY N/A 1/4/2022    Procedure: GLOSSECTOMY;  Surgeon: Naeem  DINESH Smalls MD;  Location: [facility] OR [address] [place] [address];  Service: ENT;  Laterality: N/A;    RADICAL NECK DISSECTION Left 1/3/2022    Procedure: DISSECTION, NECK, RADICAL;  Surgeon: Naeem Smalls MD;  Location: [facility] OR [address] [place] [address];  Service: ENT;  Laterality: Left;    SKIN CANCER EXCISION      TRACHEOTOMY N/A 2022    Procedure: TRACHEOTOMY;  Surgeon: Naeem Smalls MD;  Location: [facility] OR [address] [place] [address];  Service: ENT;  Laterality: N/A;       Review of patient's allergies indicates:  No Known Allergies    No current facility-administered medications on file prior to encounter.     Current Outpatient Medications on File Prior to Encounter   Medication Sig    amLODIPine (NORVASC) 10 MG tablet Take 10 mg by mouth.    atorvastatin (LIPITOR) 20 MG tablet 1 tablet (20 mg total) by Per G Tube route once daily.    clopidogreL (PLAVIX) 75 mg tablet Take 75 mg by mouth once daily.    glycopyrrolate (CUVPOSA) 1 mg/5 mL (0.2 mg/mL) Soln Take 5 mLs (1 mg total) by mouth 3 (three) times daily as needed (TO REDUCE SECRETIONS).    guaiFENesin 200 mg/5 mL Liqd Take 400 mg by mouth every 4 (four) hours as needed (for secretions).    hydrOXYzine HCL (ATARAX) 25 MG tablet SMARTSI.5 Tablet(s) Gastro Tube Every 8 Hours PRN    melatonin (MELATIN) 3 mg tablet 2 tablets (6 mg total) by Per G Tube route nightly.    metoprolol succinate (TOPROL-XL) 200 MG 24 hr tablet Take 200 mg by mouth 2 (two) times daily.    multivit-min/FA/lycopen/lutein (CENTRUM SILVER MEN ORAL) Take by mouth.    omeprazole (PRILOSEC) 40 MG capsule Take 40 mg (contents of one capsule) twice daily through PEG tube. Mix contents of capsule with 10 mL applesauce.    sodium chloride 3% 3 % nebulizer solution Take 4 mLs by nebulization 2 (two) times a day.    WIXELA INHUB 250-50 mcg/dose diskus inhaler SMARTSI Puff(s) By Mouth Every 12 Hours    bisacodyL (DULCOLAX) 10 mg Supp Place 1 suppository (10 mg total) rectally daily as needed.    oxyCODONE (ROXICODONE) 5 mg/5 mL Soln 5  mLs (5 mg total) by Per G Tube route every 4 (four) hours as needed (Pain). (Patient not taking: Reported on 2023)    polyethylene glycol (GLYCOLAX) 17 gram PwPk 17 g by Per G Tube route 2 (two) times daily.    sodium chloride (OCEAN) 0.65 % nasal spray 1 spray by Nasal route as needed for Congestion. (Patient not taking: Reported on 2023)    [DISCONTINUED] aspirin 81 MG Chew 1 tablet (81 mg total) by Per G Tube route once daily.    [DISCONTINUED] clopidogreL (PLAVIX) 75 mg tablet 1 tablet (75 mg total) by Per G Tube route once daily. (Patient not taking: Reported on 2023)    [DISCONTINUED] losartan (COZAAR) 50 MG tablet 1 tablet (50 mg total) by Per G Tube route once daily.    [DISCONTINUED] metoprolol tartrate (LOPRESSOR) 100 MG tablet 1 tablet (100 mg total) by Per G Tube route 2 (two) times daily.     Family History    None       Tobacco Use    Smoking status: Former     Packs/day: 2.00     Years: 40.00     Pack years: 80.00     Types: Cigarettes     Start date: 1963     Quit date: 2018     Years since quittin.9    Smokeless tobacco: Never    Tobacco comments:     3/3 ppd x 40 yrs. Currently 3-4 cigarettes daily .He is trying  to quit. Is using a Vapor cigarettes  2-3 x's a day.   Substance and Sexual Activity    Alcohol use: Not Currently     Alcohol/week: 3.0 standard drinks     Types: 3 Cans of beer per week     Comment: beer daily 3-4    Drug use: No    Sexual activity: Not Currently     Review of Systems   Constitutional:  Negative for chills and fever.   HENT:  Negative for nosebleeds and tinnitus.         Positive for hemoptysis   Eyes:  Negative for photophobia and visual disturbance.   Respiratory:  Negative for shortness of breath and wheezing.    Cardiovascular:  Negative for chest pain, palpitations and leg swelling.   Gastrointestinal:  Negative for abdominal distention, nausea and vomiting.   Genitourinary:  Negative for dysuria, flank pain and hematuria.    Musculoskeletal:  Negative for gait problem and joint swelling.   Skin:  Negative for rash and wound.   Neurological:  Negative for seizures and syncope.   Objective:     Vital Signs (Most Recent):  Temp: 97.8 °F (36.6 °C) (03/18/23 1028)  Pulse: 94 (03/18/23 1531)  Resp: 20 (03/18/23 1430)  BP: 105/68 (03/18/23 1531)  SpO2: 98 % (03/18/23 1531) Vital Signs (24h Range):  Temp:  [97.8 °F (36.6 °C)] 97.8 °F (36.6 °C)  Pulse:  [85-94] 94  Resp:  [20] 20  SpO2:  [94 %-100 %] 98 %  BP: ()/(53-68) 105/68     Weight: 68 kg (150 lb)  Body mass index is 22.81 kg/m².    Physical Exam  Vitals and nursing note reviewed.   Constitutional:       General: He is not in acute distress.     Appearance: He is well-developed. He is not diaphoretic.   HENT:      Head: Normocephalic and atraumatic.      Right Ear: External ear normal.      Left Ear: External ear normal.   Eyes:      General:         Right eye: No discharge.         Left eye: No discharge.      Conjunctiva/sclera: Conjunctivae normal.   Neck:      Thyroid: No thyromegaly.      Comments: Trache with white sputum production. No active bleeding  Cardiovascular:      Rate and Rhythm: Normal rate and regular rhythm.      Heart sounds: No murmur heard.  Pulmonary:      Effort: Pulmonary effort is normal. No respiratory distress.      Breath sounds: Normal breath sounds.   Abdominal:      General: Bowel sounds are normal. There is no distension.      Palpations: Abdomen is soft. There is no mass.      Tenderness: There is no abdominal tenderness.   Musculoskeletal:         General: No deformity.      Cervical back: Normal range of motion and neck supple.   Skin:     General: Skin is warm and dry.   Neurological:      Mental Status: He is alert and oriented to person, place, and time.      Comments: Symmetric movement of BUE and BLE against gravity. Non-verbal responds with head nods   Psychiatric:         Behavior: Behavior normal.           Significant Labs: CBC:  "  Recent Labs   Lab 03/18/23  1106   WBC 10.64   HGB 7.6*   HCT 24.5*        CMP:   Recent Labs   Lab 03/18/23  1106      K 4.6      CO2 32*   *   BUN 50*   CREATININE 0.7   CALCIUM 9.2   PROT 6.7   ALBUMIN 2.9*   BILITOT 0.2   ALKPHOS 90   AST 16   ALT 16   ANIONGAP 9     Cardiac Markers:   Recent Labs   Lab 03/18/23  1106   *     Coagulation:   Recent Labs   Lab 03/18/23  1106   INR 1.0     Troponin:   Recent Labs   Lab 03/18/23  1106   TROPONINI 0.010     Urine Studies:   Recent Labs   Lab 03/18/23  1400   COLORU Yellow   APPEARANCEUA Clear   PHUR 7.0   SPECGRAV 1.020   PROTEINUA Negative   GLUCUA Negative   KETONESU Negative   BILIRUBINUA Negative   OCCULTUA Trace*   NITRITE Negative   UROBILINOGEN Negative   LEUKOCYTESUR 1+*   RBCUA 0   WBCUA 15*   BACTERIA Occasional   HYALINECASTS 3*       Significant Imaging:   Imaging Results              X-Ray Chest AP Portable (Final result)  Result time 03/18/23 12:01:13      Final result by Mariano Soto MD (03/18/23 12:01:13)                   Impression:      No detrimental change when compared with 12/29/2022.      Electronically signed by: Mariano Soto MD  Date:    03/18/2023  Time:    12:01               Narrative:    EXAMINATION:  XR CHEST AP PORTABLE    CLINICAL HISTORY:  Provided history is "chest pain;  ".    TECHNIQUE:  One view of the chest.    COMPARISON:  12/29/2022.    FINDINGS:  Tracheostomy tube is present with the tip overlying the tracheal air column between the clavicular heads and sherine.  Cardiac silhouette is borderline enlarged and similar to prior study.  Atherosclerotic calcifications overlie the aortic arch.  Probable pulmonary emphysema.  Coarsened interstitial lung markings with bibasilar subsegmental atelectasis.  Small left and trace right pleural effusions, similar to the prior study.  No pneumothorax.  Aeration is overall similar when compared with 12/29/2022. Probable percutaneous gastrostomy tube " overlies the left upper quadrant of the abdomen.

## 2023-03-18 NOTE — NURSING
"[facility]  Rapid Response Nurse Intervention/ Task    Date of Visit: 03/18/2023  Time of Visit: 1751       INTERVENTIONS/ TASK Completed:     MEWS monitoring mews score 3 with BP 85/58. I went to bedside. Pt just arrived from ED. Pt saying "feels like trach is leaking". RT notified per charge. Neyda TRIANA at pt's bedside. Message sent to PA. Pt receiving blood transfusion at this time.Will con't to monitor.   "

## 2023-03-18 NOTE — Clinical Note
The catheter was inserted into the left ventricle. Hemodynamics were performed.  and Pullback was recorded.  And catheter was removed OTW.

## 2023-03-18 NOTE — Clinical Note
The catheter was inserted into the ostium   left main. Hemodynamics were performed.  An angiography was performed of the left coronary arteries. Multiple views were taken. AO pressure recorded.

## 2023-03-18 NOTE — ASSESSMENT & PLAN NOTE
BP typically in the 90s to low 100s systolic per chart review. Will hold home metoprolol/amlodipine for hypotension in ED

## 2023-03-18 NOTE — ASSESSMENT & PLAN NOTE
Continue home statin  Home aspirin held given report of hemoptysis from wife  Home metoprolol held for low BP though per previous clinic notes, BP typically low and patient asymptomatic.

## 2023-03-18 NOTE — AI DETERIORATION ALERT
Artificial Intelligence Notification      Admit Date: 3/18/2023  LOS: 0  Code Status: Full Code   Date of Consult: 2023  : 1950  Age: 73 y.o.  Weight:   Wt Readings from Last 1 Encounters:   23 68 kg (150 lb)     Sex: male  Bed: [facility] A:   MRN: [id number]  Attending Physician: Caprice Nava MD  Primary Service: Networked reference to record PCT   Time AI Alert Received: 16:49  Time at Bedside: 16:53           Patient found without complaints with wife at bedside. Had hypotension documented in ED, which is asymptomatic and per chart review at outpt PCP appointments BP is low. Continue plans for blood transfusion.     ADDENDUM:  BP improved with change to smaller cuff size by rapid nurse during Rapid nurse rounding at 19:50pm.      Vital Signs (Most Recent):  Temp: 97.8 °F (36.6 °C) (23 1028)  Pulse: 94 (23 1531)  Resp: 20 (23 1430)  BP: 105/68 (23 1531)  SpO2: 98 % (23 1531) Vital Signs (24h Range):  Temp:  [97.8 °F (36.6 °C)] 97.8 °F (36.6 °C)  Pulse:  [85-94] 94  Resp:  [20] 20  SpO2:  [94 %-100 %] 98 %  BP: ()/(53-68) 105/68         This encounter was triggered by an Artificial Intelligence Notification.     Artificial Intelligence alert discussed with Primary team:  Name Rocco Flores PA-C

## 2023-03-18 NOTE — PROGRESS NOTES
CC: Cancer surveillance    Oncology History   Squamous cell cancer of tongue   12/16/2021 Initial Diagnosis    Squamous cell cancer of tongue     12/16/2021 Tumor Conference       His case was discussed at the Multidisciplinary Head and Neck Team Planning Meeting.    Representatives from Medical Oncology, Radiation Oncology, Head and Neck Surgical Oncology, Psychosocial Oncology, and Speech and Language Pathology discussed the case with the following recommendations:    1) surgery  2) work up lung nodule post op         1/2/2022 Cancer Staged    Staging form: Oral Cavity, AJCC 8th Edition  - Clinical stage from 1/2/2022: Stage NAFISA (cT2, cN2a, cM0)       1/4/2022 Cancer Staged    Staging form: Oral Cavity, AJCC 8th Edition  - Pathologic stage from 1/4/2022: Stage IVB (pT4a, pN3b, cM0)       2/28/2022 - 4/6/2022 Chemotherapy    Treatment Summary   Plan Name: OP HEAD NECK CISPLATIN WEEKLY + RADIOTHERAPY  Treatment Goal: Curative  Status: Inactive  Start Date: 2/28/2022  End Date: 4/6/2022  Provider: Christopher Jay MD  Chemotherapy: CISplatin (PLATINOL) 40 mg/m2 = 69 mg in sodium chloride 0.9% 500 mL chemo infusion, 40 mg/m2 = 69 mg, Intravenous, Clinic/HOD 1 time, 6 of 7 cycles  Administration: 69 mg (2/28/2022), 66 mg (3/7/2022), 69 mg (3/16/2022), 69 mg (3/23/2022), 69 mg (3/30/2022), 70 mg (4/6/2022)        Radiation Therapy    Treatment Summary  Course: C1 HN 2022  Treatment Site Ref. ID Energy Dose/Fx (Gy) #Fx Dose Correction (Gy) Total Dose (Gy) Start Date End Date Elapsed Days   IM H&N:1 PTV_66 6X 2 7 / 7 0 14 2/28/2022 3/10/2022 10   IM H&N2 PTV_66 6X 2 26 / 26 0 51.903 3/11/2022 4/22/2022 42      Secondary malignant neoplasm of cervical lymph node   2/16/2022 Initial Diagnosis    Secondary malignant neoplasm of cervical lymph node     2/28/2022 - 4/6/2022 Chemotherapy    Treatment Summary   Plan Name: OP HEAD NECK CISPLATIN WEEKLY + RADIOTHERAPY  Treatment Goal: Curative  Status: Inactive  Start Date:  2/28/2022  End Date: 4/6/2022  Provider: Christopher Jay MD  Chemotherapy: CISplatin (PLATINOL) 40 mg/m2 = 69 mg in sodium chloride 0.9% 500 mL chemo infusion, 40 mg/m2 = 69 mg, Intravenous, Clinic/HOD 1 time, 6 of 7 cycles  Administration: 69 mg (2/28/2022), 66 mg (3/7/2022), 69 mg (3/16/2022), 69 mg (3/23/2022), 69 mg (3/30/2022), 70 mg (4/6/2022)             HPI   73 y.o. male presents with the above treatment history.  No new complaints aside from occasional blood-tinged secretions from the trach.    Review of Systems   Constitutional: Negative for fatigue and unexpected weight change.   HENT: Per HPI.  Eyes: Negative for visual disturbance.   Respiratory: Negative for shortness of breath, hemoptysis   Cardiovascular: Negative for chest pain and palpitations.   Musculoskeletal: Negative for decreased ROM, back pain.   Skin: Negative for rash, sunburn, itching.   Neurological: Negative for dizziness and seizures.   Hematological: Negative for adenopathy. Does not bruise/bleed easily.   Endocrine: Negative for rapid weight loss/weight gain, heat/cold intolerance.     Past Medical History   Patient Active Problem List   Diagnosis    Peripheral vascular disease    Carotid stenosis    Squamous cell cancer of tongue    Coronary artery disease involving native coronary artery of native heart without angina pectoris    Primary hypertension    Other hyperlipidemia    Impaired mobility and ADLs    Tracheobronchitis    Toxic effect of tobacco cigarette, intentional self-harm    COPD exacerbation    Acute blood loss anemia    Secondary malignant neoplasm of cervical lymph node    Protein-calorie malnutrition    Chronic respiratory failure with hypoxia, on home O2 therapy    Dysphagia    Aspiration pneumonia    Goals of care, counseling/discussion    Pulmonary infiltrate    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 ENDO (2ND FLR);  Service: Endoscopy;  Laterality: N/A;    COLONOSCOPY N/A 9/30/2022    Procedure: COLONOSCOPY;  Surgeon: Joy Cabrera MD;  Location: [facility] ENDO (2ND FLR);  Service: Endoscopy;  Laterality: N/A;    CORONARY STENT PLACEMENT  01/2000    DISSECTION OF NECK Bilateral 1/4/2022    Procedure: DISSECTION, NECK;  Surgeon: Naeem Smalls MD;  [facility];  Service: ENT;  Laterality: Bilateral;    ESOPHAGOGASTRODUODENOSCOPY N/A 9/27/2022    Procedure: EGD (ESOPHAGOGASTRODUODENOSCOPY);  Surgeon: Donnie Peterson MD;  Location: [facility] ENDO (2ND FLR);  Service: Endoscopy;  Laterality: N/A;    ESOPHAGOGASTRODUODENOSCOPY N/A 9/30/2022    Procedure: EGD (ESOPHAGOGASTRODUODENOSCOPY);  Surgeon: Joy Cabrera MD;  Location: [facility] ENDO (2ND FLR);  Service: Endoscopy;  Laterality: N/A;    ESOPHAGOGASTRODUODENOSCOPY W/ PEG N/A 1/4/2022    Procedure: EGD, WITH PEG TUBE INSERTION;  Surgeon: Anthony Calabrese MD;  Location: [address];  Service: General;  Laterality: N/A;    EYE SURGERY      Cataract bilateral    femoral stents      bilateral    FLAP PROCEDURE N/A 1/3/2022    Procedure: CREATION, FREE FLAP;  Surgeon: Naeem Smalls MD;  [facility];  Service: ENT;  Laterality: N/A;    FLAP PROCEDURE Right 1/4/2022    Procedure: CREATION, FREE FLAP;  Surgeon: Stacey Conde MD;  [facility];  Service: ENT;  Laterality: Right;  Ischemic start 1203  Ischemic stop 1353    GLOSSECTOMY N/A 1/4/2022    Procedure: GLOSSECTOMY;  Surgeon: Naeem Smalls MD;  Location: [facility] OR [facility]  FLR;  Service: ENT;  Laterality: N/A;    RADICAL NECK DISSECTION Left 1/3/2022    Procedure: DISSECTION, NECK, RADICAL;  Surgeon: Naeem Smalls MD;  [facility];  Service: ENT;  Laterality: Left;    SKIN CANCER EXCISION      TRACHEOTOMY N/A 2022    Procedure: TRACHEOTOMY;  Surgeon: Naeem Smalls MD;  Location: [facility] OR [facility] FLR;  Service: ENT;  Laterality: N/A;         Family History   History reviewed. No pertinent family history.        Social History   .  Social History     Socioeconomic History    Marital status:    Tobacco Use    Smoking status: Former     Packs/day: 2.00     Years: 40.00     Pack years: 80.00     Types: Cigarettes     Start date: 1963     Quit date: 2018     Years since quittin.9    Smokeless tobacco: Never    Tobacco comments:     3/3 ppd x 40 yrs. Currently 3-4 cigarettes daily .He is trying  to quit. Is using a Vapor cigarettes  2-3 x's a day.   Substance and Sexual Activity    Alcohol use: Not Currently     Alcohol/week: 3.0 standard drinks     Types: 3 Cans of beer per week     Comment: beer daily 3-4    Drug use: No    Sexual activity: Not Currently         Allergies   Review of patient's allergies indicates:  No Known Allergies        Physical Exam     Vitals:    23 1600   BP: 102/68   Pulse: 75           Body mass index is 22.05 kg/m².      General: AOx3, NAD   Respiratory:  Symmetric chest rise, normal effort  Oral cavity/oropharynx:  No worrisome lesions.  Flap well incorporated.  Neck:  Well-healed neck scars.  Size 6 uncuffed Shiley tracheostomy tube in place- exchanged for identical tube.  No LAD.  Face: House Brackmann I bilaterally.     Scope via trach and OC. No tracheal lesions or lesions of the pharynx. No bleeding noted.      Assessment/Plan  Problem List Items Addressed This Visit          Oncology    Squamous cell cancer of tongue - Primary     HARVEY.  RTC 3 mos for surveillance/trach change.

## 2023-03-18 NOTE — NURSING
Pt arrive to the unit by stretcher accompanied by transport Assisted pt to bed. Tele monitoring initiated.  Admit assessment initiated.  Pt is AAOx4.  NO distress noted.

## 2023-03-18 NOTE — ASSESSMENT & PLAN NOTE
Followed by oncology and ENT outpt. No longer on chemo or radiation. Had trache changed by ENT 2 days ago and scope without signs of bleeding.   Continue outpatient ENT and oncology follow up

## 2023-03-18 NOTE — ASSESSMENT & PLAN NOTE
Continue medications via PEG. Does not take oral intake  On isosource 1.5 6 days daily with 120cc free water flushes via wife  Will continue home tube feedings, with nutrition consulted

## 2023-03-18 NOTE — Clinical Note
An angiography was performed of the aorta. The angiography was performed via power injection. The injected amount was 20 mL contrast at 10 mL/s. The PSI from the power injection was 1091.

## 2023-03-18 NOTE — ED NOTES
Blood bank reporting 2 RBC units crossmatched and available for issue. Orders to transfuse pending provider assessment. Consent signed and at bedside.

## 2023-03-18 NOTE — NURSING
Nurses Note -- 4 Eyes      3/18/2023   6:06 PM      Skin assessed during: Admit      [x] No Pressure Injuries Present    [x]Prevention Measures Documented      [] Yes- Altered Skin Integrity Present or Discovered   [] LDA Added if Not in Epic (Describe Wound)   [] New Altered Skin Integrity was Present on Admit and Documented in LDA   [] Wound Image Taken    Wound Care Consulted? No    Attending Nurse:  Neyda Saravia RN     Second RN/Staff Member:  Kirsten Tenorio RN

## 2023-03-18 NOTE — RESPIRATORY THERAPY
Pt has 6 Shiley trach in place ambu at [facility] and 4 and 6 sized spare trachs   pt awake alert oriented at this time no respiratory distress   wife at bedside

## 2023-03-18 NOTE — ASSESSMENT & PLAN NOTE
Lab Results   Component Value Date    TSH 4.861 (H) 01/24/2023     Not currently on medication outpt.

## 2023-03-18 NOTE — H&P
[facility] Medicine  History & Physical    Patient Name: Fareed Richard Jr.  MRN: [id number]  Patient Class: OP- Observation  Admission Date: 3/18/2023  Attending Physician: Caprice Nava MD   Primary Care Provider: Kodi Tubbs MD         Patient information was obtained from patient, past medical records and ER records.     Subjective:     Principal Problem:Anemia    Chief Complaint:   Chief Complaint   Patient presents with    Hemoptysis     Ems called to 74yo male that wife noticed was having blood y sputum in his trach on Wednesday. Went thursdsay to have a trach change and the dr was unale to scope his mouth above the trach due to him gagging but did change the trach. Continued to have the pink sputum until 0300 today and it had bright red blood from trach. Denied any pain or SOB        HPI: Fareed Richard Jr. 73 y.o. 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  Per his wife 4 days ago he began to have pink tinged sputum via his trach.  He was seen by his ENT 2 days ago with a scope exam and a trach exchange without evidence of bleeding.  This morning at 3:00 a.m. he developed bright red blood via trach which resolved without intervention.  It is since not recurred.  He takes a daily aspirin.  He receives all medications via G-tube.  His tube feeding regimen consists of 6 cans of Isosource daily with 120 cc free water boluses.  He denies fever chest pain shortness of breath nausea vomiting abdominal pain leg swelling syncope dizziness dysuria melena hematuria hematemesis.    In the ED, hemoglobin 7.6 INR 1.0 BUN 50 creatinine 0.7  troponin negative BRCA negative O positive type and screen chest x-ray without detrimental change hypotensive to 85/54.      Past Medical History:   Diagnosis Date    Cancer     skin to Rt forearm and face    COPD (chronic  obstructive pulmonary disease)     Hyperlipidemia     Hypertension     Pseudoaneurysm        Past Surgical History:   Procedure Laterality Date    ABDOMINAL SURGERY      stents placed in liver and large intestines, per patient    CAROTID STENT      COLONOSCOPY N/A 9/27/2022    Procedure: COLONOSCOPY;  Surgeon: Donnie Peterson MD;  Location: [facility] ENDO (2ND FLR);  Service: Endoscopy;  Laterality: N/A;    COLONOSCOPY N/A 9/30/2022    Procedure: COLONOSCOPY;  Surgeon: Joy Cabrera MD;  Location: [facility] ENDO (2ND FLR);  Service: Endoscopy;  Laterality: N/A;    CORONARY STENT PLACEMENT  01/2000    DISSECTION OF NECK Bilateral 1/4/2022    Procedure: DISSECTION, NECK;  Surgeon: Naeem Smalls MD;  [facility];  Service: ENT;  Laterality: Bilateral;    ESOPHAGOGASTRODUODENOSCOPY N/A 9/27/2022    Procedure: EGD (ESOPHAGOGASTRODUODENOSCOPY);  Surgeon: Donnie Peterson MD;  Location: [facility] ENDO (2ND FLR);  Service: Endoscopy;  Laterality: N/A;    ESOPHAGOGASTRODUODENOSCOPY N/A 9/30/2022    Procedure: EGD (ESOPHAGOGASTRODUODENOSCOPY);  Surgeon: Joy Cabrera MD;  Location: [facility] ENDO (2ND FLR);  Service: Endoscopy;  Laterality: N/A;    ESOPHAGOGASTRODUODENOSCOPY W/ PEG N/A 1/4/2022    Procedure: EGD, WITH PEG TUBE INSERTION;  Surgeon: Anthony Calabrese MD;  [facility];  Service: General;  Laterality: N/A;    EYE SURGERY      Cataract bilateral    femoral stents      bilateral    FLAP PROCEDURE N/A 1/3/2022    Procedure: CREATION, FREE FLAP;  Surgeon: Naeem Smalls MD;  [facility];  Service: ENT;  Laterality: N/A;    FLAP PROCEDURE Right 1/4/2022    Procedure: CREATION, FREE FLAP;  Surgeon: Stacey Conde MD;  [facility];  Service: ENT;  Laterality: Right;  Ischemic start 1203  Ischemic stop 1353    GLOSSECTOMY N/A 1/4/2022    Procedure: GLOSSECTOMY;  Surgeon: Naeem Smalls MD;  [facility];  Service: ENT;  Laterality: N/A;     RADICAL NECK DISSECTION Left 1/3/2022    Procedure: DISSECTION, NECK, RADICAL;  Surgeon: Naeem Smalls MD;  [facility];  Service: ENT;  Laterality: Left;    SKIN CANCER EXCISION      TRACHEOTOMY N/A 2022    Procedure: TRACHEOTOMY;  Surgeon: Naeem Smalls MD;  [facility];  Service: ENT;  Laterality: N/A;       Review of patient's allergies indicates:  No Known Allergies    No current facility-administered medications on file prior to encounter.     Current Outpatient Medications on File Prior to Encounter   Medication Sig    amLODIPine (NORVASC) 10 MG tablet Take 10 mg by mouth.    atorvastatin (LIPITOR) 20 MG tablet 1 tablet (20 mg total) by Per G Tube route once daily.    clopidogreL (PLAVIX) 75 mg tablet Take 75 mg by mouth once daily.    glycopyrrolate (CUVPOSA) 1 mg/5 mL (0.2 mg/mL) Soln Take 5 mLs (1 mg total) by mouth 3 (three) times daily as needed (TO REDUCE SECRETIONS).    guaiFENesin 200 mg/5 mL Liqd Take 400 mg by mouth every 4 (four) hours as needed (for secretions).    hydrOXYzine HCL (ATARAX) 25 MG tablet SMARTSI.5 Tablet(s) Gastro Tube Every 8 Hours PRN    melatonin (MELATIN) 3 mg tablet 2 tablets (6 mg total) by Per G Tube route nightly.    metoprolol succinate (TOPROL-XL) 200 MG 24 hr tablet Take 200 mg by mouth 2 (two) times daily.    multivit-min/FA/lycopen/lutein (CENTRUM SILVER MEN ORAL) Take by mouth.    omeprazole (PRILOSEC) 40 MG capsule Take 40 mg (contents of one capsule) twice daily through PEG tube. Mix contents of capsule with 10 mL applesauce.    sodium chloride 3% 3 % nebulizer solution Take 4 mLs by nebulization 2 (two) times a day.    WIXELA INHUB 250-50 mcg/dose diskus inhaler SMARTSI Puff(s) By Mouth Every 12 Hours    bisacodyL (DULCOLAX) 10 mg Supp Place 1 suppository (10 mg total) rectally daily as needed.    oxyCODONE (ROXICODONE) 5 mg/5 mL Soln 5 mLs (5 mg total) by Per G Tube route every 4 (four) hours as  needed (Pain). (Patient not taking: Reported on 2023)    polyethylene glycol (GLYCOLAX) 17 gram PwPk 17 g by Per G Tube route 2 (two) times daily.    sodium chloride (OCEAN) 0.65 % nasal spray 1 spray by Nasal route as needed for Congestion. (Patient not taking: Reported on 2023)    [DISCONTINUED] aspirin 81 MG Chew 1 tablet (81 mg total) by Per G Tube route once daily.    [DISCONTINUED] clopidogreL (PLAVIX) 75 mg tablet 1 tablet (75 mg total) by Per G Tube route once daily. (Patient not taking: Reported on 2023)    [DISCONTINUED] losartan (COZAAR) 50 MG tablet 1 tablet (50 mg total) by Per G Tube route once daily.    [DISCONTINUED] metoprolol tartrate (LOPRESSOR) 100 MG tablet 1 tablet (100 mg total) by Per G Tube route 2 (two) times daily.     Family History    None       Tobacco Use    Smoking status: Former     Packs/day: 2.00     Years: 40.00     Pack years: 80.00     Types: Cigarettes     Start date: 1963     Quit date: 2018     Years since quittin.9    Smokeless tobacco: Never    Tobacco comments:     3/3 ppd x 40 yrs. Currently 3-4 cigarettes daily .He is trying  to quit. Is using a Vapor cigarettes  2-3 x's a day.   Substance and Sexual Activity    Alcohol use: Not Currently     Alcohol/week: 3.0 standard drinks     Types: 3 Cans of beer per week     Comment: beer daily 3-4    Drug use: No    Sexual activity: Not Currently     Review of Systems   Constitutional:  Negative for chills and fever.   HENT:  Negative for nosebleeds and tinnitus.         Positive for hemoptysis   Eyes:  Negative for photophobia and visual disturbance.   Respiratory:  Negative for shortness of breath and wheezing.    Cardiovascular:  Negative for chest pain, palpitations and leg swelling.   Gastrointestinal:  Negative for abdominal distention, nausea and vomiting.   Genitourinary:  Negative for dysuria, flank pain and hematuria.   Musculoskeletal:  Negative for gait problem and joint  swelling.   Skin:  Negative for rash and wound.   Neurological:  Negative for seizures and syncope.   Objective:     Vital Signs (Most Recent):  Temp: 97.8 °F (36.6 °C) (03/18/23 1028)  Pulse: 94 (03/18/23 1531)  Resp: 20 (03/18/23 1430)  BP: 105/68 (03/18/23 1531)  SpO2: 98 % (03/18/23 1531) Vital Signs (24h Range):  Temp:  [97.8 °F (36.6 °C)] 97.8 °F (36.6 °C)  Pulse:  [85-94] 94  Resp:  [20] 20  SpO2:  [94 %-100 %] 98 %  BP: ()/(53-68) 105/68     Weight: 68 kg (150 lb)  Body mass index is 22.81 kg/m².    Physical Exam  Vitals and nursing note reviewed.   Constitutional:       General: He is not in acute distress.     Appearance: He is well-developed. He is not diaphoretic.   HENT:      Head: Normocephalic and atraumatic.      Right Ear: External ear normal.      Left Ear: External ear normal.   Eyes:      General:         Right eye: No discharge.         Left eye: No discharge.      Conjunctiva/sclera: Conjunctivae normal.   Neck:      Thyroid: No thyromegaly.      Comments: Trache with white sputum production. No active bleeding  Cardiovascular:      Rate and Rhythm: Normal rate and regular rhythm.      Heart sounds: No murmur heard.  Pulmonary:      Effort: Pulmonary effort is normal. No respiratory distress.      Breath sounds: Normal breath sounds.   Abdominal:      General: Bowel sounds are normal. There is no distension.      Palpations: Abdomen is soft. There is no mass.      Tenderness: There is no abdominal tenderness.   Musculoskeletal:         General: No deformity.      Cervical back: Normal range of motion and neck supple.   Skin:     General: Skin is warm and dry.   Neurological:      Mental Status: He is alert and oriented to person, place, and time.      Comments: Symmetric movement of BUE and BLE against gravity. Non-verbal responds with head nods   Psychiatric:         Behavior: Behavior normal.           Significant Labs: CBC:   Recent Labs   Lab 03/18/23  1106   WBC 10.64   HGB 7.6*  "  HCT 24.5*        CMP:   Recent Labs   Lab 03/18/23  1106      K 4.6      CO2 32*   *   BUN 50*   CREATININE 0.7   CALCIUM 9.2   PROT 6.7   ALBUMIN 2.9*   BILITOT 0.2   ALKPHOS 90   AST 16   ALT 16   ANIONGAP 9     Cardiac Markers:   Recent Labs   Lab 03/18/23  1106   *     Coagulation:   Recent Labs   Lab 03/18/23  1106   INR 1.0     Troponin:   Recent Labs   Lab 03/18/23  1106   TROPONINI 0.010     Urine Studies:   Recent Labs   Lab 03/18/23  1400   COLORU Yellow   APPEARANCEUA Clear   PHUR 7.0   SPECGRAV 1.020   PROTEINUA Negative   GLUCUA Negative   KETONESU Negative   BILIRUBINUA Negative   OCCULTUA Trace*   NITRITE Negative   UROBILINOGEN Negative   LEUKOCYTESUR 1+*   RBCUA 0   WBCUA 15*   BACTERIA Occasional   HYALINECASTS 3*       Significant Imaging:   Imaging Results              X-Ray Chest AP Portable (Final result)  Result time 03/18/23 12:01:13      Final result by Mariano Soto MD (03/18/23 12:01:13)                   Impression:      No detrimental change when compared with 12/29/2022.      Electronically signed by: Mariano Soto MD  Date:    03/18/2023  Time:    12:01               Narrative:    EXAMINATION:  XR CHEST AP PORTABLE    CLINICAL HISTORY:  Provided history is "chest pain;  ".    TECHNIQUE:  One view of the chest.    COMPARISON:  12/29/2022.    FINDINGS:  Tracheostomy tube is present with the tip overlying the tracheal air column between the clavicular heads and sherine.  Cardiac silhouette is borderline enlarged and similar to prior study.  Atherosclerotic calcifications overlie the aortic arch.  Probable pulmonary emphysema.  Coarsened interstitial lung markings with bibasilar subsegmental atelectasis.  Small left and trace right pleural effusions, similar to the prior study.  No pneumothorax.  Aeration is overall similar when compared with 12/29/2022. Probable percutaneous gastrostomy tube overlies the left upper quadrant of the abdomen.         "                                Assessment/Plan:     * Anemia  Presents with hemoptysis intermittently over last 4 days. Seen at ENT with exam without source. Trache exchanged at that time. Hemoglobin 7.6 from baseline of 8-9.   Will type and screen and transfuse 1 unit  Check iron studies  No further hemoptysis per wife since 3am.   Per chart review chronically hypotensive at outpt appointments. Home metoprolol held    PEG (percutaneous endoscopic gastrostomy) status  Continue medications via PEG. 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     Chronic respiratory failure with hypoxia, on home O2 therapy  Continue oxygen via trache    Other hyperlipidemia  Continue home statin    Primary hypertension  BP typically in the 90s to low 100s systolic per chart review. Will hold home metoprolol/amlodipine for hypotension in ED    Coronary artery disease involving native coronary artery of native heart without angina pectoris  Continue home statin  Home aspirin held given report of hemoptysis from wife  Home metoprolol held for low BP though per previous clinic notes, BP typically low and patient asymptomatic.    Squamous cell cancer of tongue  Followed by oncology and ENT outpt. No longer on chemo or radiation. Had trache changed by ENT 2 days ago and scope without signs of bleeding.   Continue outpatient ENT and oncology follow up      VTE Risk Mitigation (From admission, onward)         Ordered     IP VTE HIGH RISK PATIENT  Once         03/18/23 1620     Place sequential compression device  Until discontinued         03/18/23 1620     Place  NIKITA hose  Until discontinued         03/18/23 1620              Discussed with ED physician.    VTE: NIKITA/SCD lovenox held for recent hemoptysis  Code: Full  Diet: tube feedings via PEG  Dispo: pending transfusion    On 03/18/2023, patient should be placed in hospital observation services under my care in collaboration with Caprice Nava MD.      Rocco Flores PA-C  Department of [facility] Medicine  [facility]

## 2023-03-18 NOTE — HPI
Fareed Richard Jr. 73 y.o. 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  Per his wife 4 days ago he began to have pink tinged sputum via his trach.  He was seen by his ENT 2 days ago with a scope exam and a trach exchange without evidence of bleeding.  This morning at 3:00 a.m. he developed bright red blood via trach which resolved without intervention.  It is since not recurred.  He takes a daily aspirin.  He receives all medications via G-tube.  His tube feeding regimen consists of 6 cans of Isosource daily with 120 cc free water boluses.  He denies fever chest pain shortness of breath nausea vomiting abdominal pain leg swelling syncope dizziness dysuria melena hematuria hematemesis.    In the ED, hemoglobin 7.6 INR 1.0 BUN 50 creatinine 0.7  troponin negative BRCA negative O positive type and screen chest x-ray without detrimental change hypotensive to 85/54.

## 2023-03-18 NOTE — Clinical Note
The catheter was inserted into the ostial  left coronary artery. An angiography was performed of the left coronary arteries.

## 2023-03-18 NOTE — Clinical Note
Left: Back.   Hair: N/A.  Skin prep dry before draping.  Prepped by: Milo Cabrera MD 4/18/2023 3:42 PM.

## 2023-03-18 NOTE — NURSING
PER handoff given to KAMILAH Ramos     Pt resting in bed quietly. NAD noted. No c/o pain.  Fall and safety precautions maintained. Bed alarm activated and audible.. Bed locked in lowest position, with side rails up x2. Call bell and personal items within reach

## 2023-03-18 NOTE — Clinical Note
The catheter was reinserted into the ostium   left main. An angiography was performed of the left coronary arteries. Multiple views were taken. Additional LCA angiography performed.

## 2023-03-18 NOTE — ED TRIAGE NOTES
Patient BIB EMS for evaluation of blood tinged sputum from his trach since Wednesday. Patient reporting SOB, cough, and fatigue. Patient hypotensive upon arrival to the ED and oxygen saturation 94% on 5L NC. Patient denies any pain, n/v/d, or GI/ symptoms. Pmhx of COPD, HTN, and Skin cancer. Suctioning equipment at bedside. Wife at bedside. Pt is AAOx4, non verbal (communicates via written notes), NAD noted.

## 2023-03-18 NOTE — NURSING
[facility]  Rapid Response Nurse Intervention/ Task    Date of Visit: 03/18/2023  Time of Visit: 1820       INTERVENTIONS/ TASK Completed:     I went back to bedside. Pt states that the trach feels better. Pt looks comfortable. I informed him and his wife that the rapid nurse will monitor him closely thru out nite.

## 2023-03-18 NOTE — ASSESSMENT & PLAN NOTE
Presents with hemoptysis intermittently over last 4 days. Seen at ENT with exam without source. Trache exchanged at that time. Hemoglobin 7.6 from baseline of 8-9.   Will type and screen and transfuse 1 unit  Check iron studies  No further hemoptysis per wife since 3am.   Per chart review chronically hypotensive at outpt appointments. Home metoprolol held

## 2023-03-18 NOTE — PLAN OF CARE
[facility] Emergency Dept  Discharge Assessment    SW completed brief assessment and discussed discharge planning with patient and his wife elpidio at his bedside. Patient lives with his wife who is his main support. Patient's wife will provide transportation for him to get home when discharge from the hospital.    Primary Care Provider: Kodi Tubbs MD     Discharge Assessment (most recent)       BRIEF DISCHARGE ASSESSMENT - 03/18/23 3945          Discharge Planning    Assessment Type Discharge Planning Brief Assessment     Resource/Environmental Concerns none     Support Systems Spouse/significant other     Equipment Currently Used at Home walker, rolling;hospital bed;suction machine;wheelchair;oxygen;bedside commode     Current Living Arrangements home     Patient/Family Anticipates Transition to home with family     Patient/Family Anticipated Services at Transition none     DME Needed Upon Discharge  none     Discharge Plan A Home with family     Discharge Plan B Home with family

## 2023-03-19 PROBLEM — N39.0 UTI (URINARY TRACT INFECTION): Status: ACTIVE | Noted: 2023-03-19

## 2023-03-19 PROBLEM — R04.2 HEMOPTYSIS: Status: ACTIVE | Noted: 2023-03-19

## 2023-03-19 PROBLEM — I95.9 HYPOTENSION: Status: ACTIVE | Noted: 2023-03-19

## 2023-03-19 LAB
ALBUMIN SERPL BCP-MCNC: 2.4 G/DL (ref 3.5–5.2)
ALP SERPL-CCNC: 65 U/L (ref 55–135)
ALT SERPL W/O P-5'-P-CCNC: 15 U/L (ref 10–44)
ANION GAP SERPL CALC-SCNC: 6 MMOL/L (ref 8–16)
AST SERPL-CCNC: 22 U/L (ref 10–40)
BASOPHILS # BLD AUTO: 0.03 K/UL (ref 0–0.2)
BASOPHILS NFR BLD: 0.3 % (ref 0–1.9)
BILIRUB SERPL-MCNC: 0.4 MG/DL (ref 0.1–1)
BLD PROD TYP BPU: NORMAL
BLOOD UNIT EXPIRATION DATE: NORMAL
BLOOD UNIT TYPE CODE: 5100
BLOOD UNIT TYPE: NORMAL
BUN SERPL-MCNC: 44 MG/DL (ref 8–23)
CALCIUM SERPL-MCNC: 8.2 MG/DL (ref 8.7–10.5)
CHLORIDE SERPL-SCNC: 109 MMOL/L (ref 95–110)
CO2 SERPL-SCNC: 29 MMOL/L (ref 23–29)
CODING SYSTEM: NORMAL
CREAT SERPL-MCNC: 0.7 MG/DL (ref 0.5–1.4)
CROSSMATCH INTERPRETATION: NORMAL
D DIMER PPP IA.FEU-MCNC: 0.96 MG/L FEU
DIFFERENTIAL METHOD: ABNORMAL
DISPENSE STATUS: NORMAL
EOSINOPHIL # BLD AUTO: 0 K/UL (ref 0–0.5)
EOSINOPHIL NFR BLD: 0.3 % (ref 0–8)
ERYTHROCYTE [DISTWIDTH] IN BLOOD BY AUTOMATED COUNT: 14.7 % (ref 11.5–14.5)
ERYTHROCYTE [DISTWIDTH] IN BLOOD BY AUTOMATED COUNT: 15.4 % (ref 11.5–14.5)
EST. GFR  (NO RACE VARIABLE): >60 ML/MIN/1.73 M^2
FOLATE SERPL-MCNC: 16.5 NG/ML (ref 4–24)
GLUCOSE SERPL-MCNC: 132 MG/DL (ref 70–110)
HCT VFR BLD AUTO: 21.9 % (ref 40–54)
HCT VFR BLD AUTO: 22.7 % (ref 40–54)
HGB BLD-MCNC: 6.5 G/DL (ref 14–18)
HGB BLD-MCNC: 7.2 G/DL (ref 14–18)
IMM GRANULOCYTES # BLD AUTO: 0.06 K/UL (ref 0–0.04)
IMM GRANULOCYTES NFR BLD AUTO: 0.6 % (ref 0–0.5)
LACTATE SERPL-SCNC: 1.8 MMOL/L (ref 0.5–2.2)
LYMPHOCYTES # BLD AUTO: 0.6 K/UL (ref 1–4.8)
LYMPHOCYTES NFR BLD: 6.3 % (ref 18–48)
MCH RBC QN AUTO: 28 PG (ref 27–31)
MCH RBC QN AUTO: 29.6 PG (ref 27–31)
MCHC RBC AUTO-ENTMCNC: 29.7 G/DL (ref 32–36)
MCHC RBC AUTO-ENTMCNC: 31.7 G/DL (ref 32–36)
MCV RBC AUTO: 93 FL (ref 82–98)
MCV RBC AUTO: 94 FL (ref 82–98)
MONOCYTES # BLD AUTO: 1.1 K/UL (ref 0.3–1)
MONOCYTES NFR BLD: 11.4 % (ref 4–15)
NEUTROPHILS # BLD AUTO: 8 K/UL (ref 1.8–7.7)
NEUTROPHILS NFR BLD: 81.1 % (ref 38–73)
NRBC BLD-RTO: 0 /100 WBC
NUM UNITS TRANS PACKED RBC: NORMAL
PLATELET # BLD AUTO: 203 K/UL (ref 150–450)
PLATELET # BLD AUTO: 206 K/UL (ref 150–450)
PMV BLD AUTO: 9.4 FL (ref 9.2–12.9)
PMV BLD AUTO: 9.8 FL (ref 9.2–12.9)
POTASSIUM SERPL-SCNC: 4.8 MMOL/L (ref 3.5–5.1)
PROT SERPL-MCNC: 5.5 G/DL (ref 6–8.4)
RBC # BLD AUTO: 2.32 M/UL (ref 4.6–6.2)
RBC # BLD AUTO: 2.43 M/UL (ref 4.6–6.2)
SODIUM SERPL-SCNC: 144 MMOL/L (ref 136–145)
TROPONIN I SERPL DL<=0.01 NG/ML-MCNC: 1.74 NG/ML (ref 0–0.03)
TROPONIN I SERPL DL<=0.01 NG/ML-MCNC: 2.22 NG/ML (ref 0–0.03)
TSH SERPL DL<=0.005 MIU/L-ACNC: 2.79 UIU/ML (ref 0.4–4)
VIT B12 SERPL-MCNC: 1079 PG/ML (ref 210–950)
WBC # BLD AUTO: 8.74 K/UL (ref 3.9–12.7)
WBC # BLD AUTO: 9.9 K/UL (ref 3.9–12.7)

## 2023-03-19 PROCEDURE — 27000221 HC OXYGEN, UP TO 24 HOURS

## 2023-03-19 PROCEDURE — 99900026 HC AIRWAY MAINTENANCE (STAT)

## 2023-03-19 PROCEDURE — 36430 TRANSFUSION BLD/BLD COMPNT: CPT

## 2023-03-19 PROCEDURE — 94640 AIRWAY INHALATION TREATMENT: CPT

## 2023-03-19 PROCEDURE — 94761 N-INVAS EAR/PLS OXIMETRY MLT: CPT

## 2023-03-19 PROCEDURE — 25000003 PHARM REV CODE 250: Performed by: NURSE PRACTITIONER

## 2023-03-19 PROCEDURE — 36410 VNPNXR 3YR/> PHY/QHP DX/THER: CPT

## 2023-03-19 PROCEDURE — 99291 PR CRITICAL CARE, E/M 30-74 MINUTES: ICD-10-PCS | Mod: ,,, | Performed by: INTERNAL MEDICINE

## 2023-03-19 PROCEDURE — 25000003 PHARM REV CODE 250: Performed by: HOSPITALIST

## 2023-03-19 PROCEDURE — 99291 CRITICAL CARE FIRST HOUR: CPT | Mod: ,,, | Performed by: INTERNAL MEDICINE

## 2023-03-19 PROCEDURE — 25000003 PHARM REV CODE 250: Performed by: PHYSICIAN ASSISTANT

## 2023-03-19 PROCEDURE — 63600175 PHARM REV CODE 636 W HCPCS: Mod: TB,JG | Performed by: HOSPITALIST

## 2023-03-19 PROCEDURE — 25500020 PHARM REV CODE 255: Performed by: HOSPITALIST

## 2023-03-19 PROCEDURE — P9016 RBC LEUKOCYTES REDUCED: HCPCS | Performed by: HOSPITALIST

## 2023-03-19 PROCEDURE — 85379 FIBRIN DEGRADATION QUANT: CPT | Performed by: HOSPITALIST

## 2023-03-19 PROCEDURE — 84484 ASSAY OF TROPONIN QUANT: CPT | Performed by: HOSPITALIST

## 2023-03-19 PROCEDURE — C1751 CATH, INF, PER/CENT/MIDLINE: HCPCS

## 2023-03-19 PROCEDURE — 85027 COMPLETE CBC AUTOMATED: CPT | Performed by: HOSPITALIST

## 2023-03-19 PROCEDURE — 99900035 HC TECH TIME PER 15 MIN (STAT)

## 2023-03-19 PROCEDURE — 36415 COLL VENOUS BLD VENIPUNCTURE: CPT | Performed by: PHYSICIAN ASSISTANT

## 2023-03-19 PROCEDURE — 85025 COMPLETE CBC W/AUTO DIFF WBC: CPT | Performed by: PHYSICIAN ASSISTANT

## 2023-03-19 PROCEDURE — 83605 ASSAY OF LACTIC ACID: CPT | Performed by: HOSPITALIST

## 2023-03-19 PROCEDURE — 87040 BLOOD CULTURE FOR BACTERIA: CPT | Mod: 59 | Performed by: HOSPITALIST

## 2023-03-19 PROCEDURE — 20000000 HC ICU ROOM

## 2023-03-19 PROCEDURE — 84443 ASSAY THYROID STIM HORMONE: CPT | Performed by: HOSPITALIST

## 2023-03-19 PROCEDURE — 80053 COMPREHEN METABOLIC PANEL: CPT | Performed by: PHYSICIAN ASSISTANT

## 2023-03-19 PROCEDURE — P9016 RBC LEUKOCYTES REDUCED: HCPCS | Performed by: EMERGENCY MEDICINE

## 2023-03-19 RX ORDER — ACETAMINOPHEN 650 MG/20.3ML
500 LIQUID ORAL EVERY 4 HOURS PRN
Status: DISCONTINUED | OUTPATIENT
Start: 2023-03-19 | End: 2023-04-30 | Stop reason: HOSPADM

## 2023-03-19 RX ORDER — LIDOCAINE 50 MG/G
2 PATCH TOPICAL
Status: DISCONTINUED | OUTPATIENT
Start: 2023-03-19 | End: 2023-04-13

## 2023-03-19 RX ORDER — HYDROCODONE BITARTRATE AND ACETAMINOPHEN 500; 5 MG/1; MG/1
TABLET ORAL
Status: DISCONTINUED | OUTPATIENT
Start: 2023-03-19 | End: 2023-03-28

## 2023-03-19 RX ORDER — ACETAMINOPHEN 650 MG/20.3ML
650 LIQUID ORAL EVERY 6 HOURS PRN
Status: DISCONTINUED | OUTPATIENT
Start: 2023-03-19 | End: 2023-03-19

## 2023-03-19 RX ORDER — CEFEPIME HYDROCHLORIDE 1 G/50ML
2 INJECTION, SOLUTION INTRAVENOUS
Status: DISCONTINUED | OUTPATIENT
Start: 2023-03-19 | End: 2023-03-25

## 2023-03-19 RX ORDER — HYDROCODONE BITARTRATE AND ACETAMINOPHEN 500; 5 MG/1; MG/1
TABLET ORAL
Status: DISCONTINUED | OUTPATIENT
Start: 2023-03-19 | End: 2023-03-20

## 2023-03-19 RX ORDER — HYDROXYZINE PAMOATE 25 MG/1
25 CAPSULE ORAL EVERY 8 HOURS PRN
Status: COMPLETED | OUTPATIENT
Start: 2023-03-20 | End: 2023-03-19

## 2023-03-19 RX ORDER — NOREPINEPHRINE BITARTRATE/D5W 4MG/250ML
0-3 PLASTIC BAG, INJECTION (ML) INTRAVENOUS CONTINUOUS
Status: DISCONTINUED | OUTPATIENT
Start: 2023-03-19 | End: 2023-03-27

## 2023-03-19 RX ADMIN — CEFEPIME HYDROCHLORIDE 2 G: 2 INJECTION, SOLUTION INTRAVENOUS at 02:03

## 2023-03-19 RX ADMIN — IOHEXOL 150 ML: 350 INJECTION, SOLUTION INTRAVENOUS at 12:03

## 2023-03-19 RX ADMIN — ACETAMINOPHEN 499.51 MG: 160 SOLUTION ORAL at 11:03

## 2023-03-19 RX ADMIN — GLYCOPYRROLATE 1 MG: 1 TABLET ORAL at 09:03

## 2023-03-19 RX ADMIN — GLYCOPYRROLATE 1 MG: 1 TABLET ORAL at 03:03

## 2023-03-19 RX ADMIN — TRANEXAMIC ACID 500 MG: 100 INJECTION, SOLUTION INTRAVENOUS at 09:03

## 2023-03-19 RX ADMIN — ATORVASTATIN CALCIUM 20 MG: 10 TABLET, FILM COATED ORAL at 09:03

## 2023-03-19 RX ADMIN — CEFEPIME HYDROCHLORIDE 2 G: 2 INJECTION, SOLUTION INTRAVENOUS at 08:03

## 2023-03-19 RX ADMIN — SODIUM CHLORIDE 1000 ML: 9 INJECTION, SOLUTION INTRAVENOUS at 11:03

## 2023-03-19 RX ADMIN — GLYCOPYRROLATE 1 MG: 1 TABLET ORAL at 08:03

## 2023-03-19 RX ADMIN — ACETAMINOPHEN 650 MG: 160 SOLUTION ORAL at 10:03

## 2023-03-19 RX ADMIN — ACETAMINOPHEN 650 MG: 160 SOLUTION ORAL at 04:03

## 2023-03-19 RX ADMIN — HYDROXYZINE PAMOATE 25 MG: 25 CAPSULE ORAL at 11:03

## 2023-03-19 RX ADMIN — LIDOCAINE 5% 2 PATCH: 700 PATCH TOPICAL at 01:03

## 2023-03-19 RX ADMIN — VANCOMYCIN HYDROCHLORIDE 1750 MG: 500 INJECTION, POWDER, LYOPHILIZED, FOR SOLUTION INTRAVENOUS at 02:03

## 2023-03-19 RX ADMIN — Medication 6 MG: at 08:03

## 2023-03-19 NOTE — PROGRESS NOTES
[facility] Medicine  Progress Note    Patient Name: Fareed Richard Jr.  MRN: [id number]  Patient Class: IP- Inpatient   Admission Date: 3/18/2023  Length of Stay: 0 days  Attending Physician: Kodi Escoto MD  Primary Care Provider: Kodi Tubbs MD        Subjective:     Principal Problem:Hypotension        HPI:  Fareed Richard Jr. 73 y.o. 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  Per his wife 4 days ago he began to have pink tinged sputum via his trach.  He was seen by his ENT 2 days ago with a scope exam and a trach exchange without evidence of bleeding.  This morning at 3:00 a.m. he developed bright red blood via trach which resolved without intervention.  It is since not recurred.  He takes a daily aspirin.  He receives all medications via G-tube.  His tube feeding regimen consists of 6 cans of Isosource daily with 120 cc free water boluses.  He denies fever chest pain shortness of breath nausea vomiting abdominal pain leg swelling syncope dizziness dysuria melena hematuria hematemesis.    In the ED, hemoglobin 7.6 INR 1.0 BUN 50 creatinine 0.7  troponin negative BRCA negative O positive type and screen chest x-ray without detrimental change hypotensive to 85/54.      Overview/Hospital Course:  No notes on file    Interval History: Very active morning with recurrent bleeding from trach and hypotension.  Rapid response called for episode of bleeding from trach and AI deterioration alert issued due to significant hypotension (SBP in 70s).  Seen twice this morning.  Initially denied any light headedness, chest pain or shortness of breath.  On second visit noted severe back/shoulder pain, but denied sob and anterior chest pain.  Left back medial to scapula was TTP.  PRBC and saline bolus infusing.  Checking cta chest, ekg, troponin, dimer and lactic acid.   Transferring to ICU.     Objective:     Vital Signs (Most Recent):  Temp: 97.6 °F (36.4 °C) (03/19/23 1150)  Pulse: 100 (03/19/23 1150)  Resp: 18 (03/19/23 1150)  BP: (!) 85/58 (03/19/23 1150)  SpO2: (!) 89 % (03/19/23 1150)   Vital Signs (24h Range):  Temp:  [97.6 °F (36.4 °C)-99 °F (37.2 °C)] 97.6 °F (36.4 °C)  Pulse:  [] 100  Resp:  [16-20] 18  SpO2:  [87 %-100 %] 89 %  BP: ()/(40-68) 85/58     Weight: 68 kg (150 lb)  Body mass index is 22.81 kg/m².    Intake/Output Summary (Last 24 hours) at 3/19/2023 1243  Last data filed at 3/19/2023 1124  Gross per 24 hour   Intake 4409.08 ml   Output 570 ml   Net 3839.08 ml        Physical Exam  Vitals and nursing note reviewed.   Constitutional:       General: He is in acute distress.      Appearance: He is well-developed. He is ill-appearing. He is not diaphoretic.      Comments: disheveled   HENT:      Head: Normocephalic and atraumatic.      Right Ear: External ear normal.      Left Ear: External ear normal.   Eyes:      General:         Right eye: No discharge.         Left eye: No discharge.      Conjunctiva/sclera: Conjunctivae normal.   Neck:      Thyroid: No thyromegaly.      Comments: Trache in place with blood tinged sputum   Cardiovascular:      Rate and Rhythm: Regular rhythm. Tachycardia present.      Heart sounds: No murmur heard.  Pulmonary:      Effort: Pulmonary effort is normal. No respiratory distress.      Breath sounds: Normal breath sounds.   Abdominal:      General: Bowel sounds are normal. There is no distension.      Palpations: Abdomen is soft. There is no mass.      Tenderness: There is no abdominal tenderness.      Comments: Peg tube in place   Musculoskeletal:         General: No deformity.      Cervical back: Normal range of motion and neck supple.   Skin:     General: Skin is warm and dry.   Neurological:      Mental Status: He is alert and oriented to person, place, and time.      Comments: Symmetric movement of BUE and BLE  against gravity. Attempts to speak and communicates by writing in his notebook.  Diffusely weak   Psychiatric:         Behavior: Behavior normal.       Significant Labs: All pertinent labs within the past 24 hours have been reviewed.    Significant Imaging: I have reviewed all pertinent imaging results/findings within the past 24 hours.  Review of Systems    Assessment/Plan:      * Hypotension  -Admitted to inpatient status  -Now with hypoxia, hypotension and bleeding tracheostomy - moving to ICU  -Prior clinic notes have shown soft blood pressure - but not this low.  -Etiology unclear - concerning for hemorrhage given bleeding at trach site and anemia.  UA suggested UTI and urine culture is growing Enterococcus but no fever, leukocytosis and negative procalcitonin).  Initial troponin negative  -Check lactic acid, d-dimer, troponin, EKG and CTA chest stat.  -Hb 7.6 on admit and 6.5 today - Continue blood transfusion actively infusing  -Bolus 1L NS  -Follow urine cultures.  Check blood cultures.  Start vancomycin and cefepime  -Order picc line and levophed available if needed.  -Consult pulmonology critical care and ENT.  No ENT here today so have call into [facility].  -Order tranexamic acid nebulizer.  -Transfer to ICU now.    Anemia  -Hb on admit 7.6 and this morning 6.5  -Baseline Hb 8-9.  -Presented w intermittent hemoptysis via trach x 4 days. Had another episode this morning (3/19/2023)  -Seen at ENT 3 days ago (Dr. Smalls) with exam without source. Trache exchanged at that time.   -Ferritin only 84 and Tsat 18% - consistent with iron deficiency  -Platelets and PT/INR are normal.  -Consult pulmonology critical care and ENT.  Call out to ENT on call for system  -Will give TXA nebulizer    Tracheostomy present  -Continue home glycopyrrolate and guaifensin  -Continue oxygen  -PRN cold suctioning given hemoptysis    Chronic respiratory failure with hypoxia, on home O2 therapy  -Patient with chronic hypoxia and states at  home is on 5L O2 via trach and O2 sats typically in high 80s low 90s  -Sats presently similar, but is having episodes of hemoptysis  -Transfering to ICU as above  -Consulting pulm-critical care and call out to ENT at [facility] to discuss  -TXA nebs ordered and transfusing prbc.  -Continue oxygen via trache    Squamous cell cancer of tongue  -Diagnosed 12/15/21 via FNA.  Underwent total glossectomy, bilateral neck dissection, bilateral cervical facial flaps and anterolateral thigh flap reconstruction of glossectomy defect 1/4/22  -Completed adjuvant chemoradiation  -Followed by oncology and ENT outpt. No longer on chemo or radiation.   -Had trache changed by ENT 3 days ago and scope without signs of bleeding.   -Treatment as above.    Hemoptysis  -Treatment as above.    UTI (urinary tract infection)  -Urine culture growing Enterococcus > 100,000 cfu/ml.  Await sensitivities  -Check blood cultures  -Start antibiotics as above.    PEG (percutaneous endoscopic gastrostomy) status  -Continue medications via PEG. Does not take oral intake  -On isosource 1.5 6 days daily with 120cc free water flushes via wife  -Will continue home tube feedings, with nutrition consulted    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   -Last TSH 4.8 - not on treatment as outpatient  -Check TSH    Other hyperlipidemia  -Continue home statin    Primary hypertension  -BP typically in the 90s to low 100s systolic per chart review.   -Now with worse hypotension  -Holding home metoprolol and amlodipine    Coronary artery disease involving native coronary artery of native heart without angina pectoris  -Denies chest pain but does have severe back/shoulder pain  -Check EKG, troponin, ddimer, stat echo and stat cta chest  -Holding aspirin and metoprolol given hypotension  -Continue statin      VTE Risk Mitigation (From admission, onward)           Ordered     IP VTE HIGH RISK PATIENT  Once         03/18/23 1620     Place sequential  compression device  Until discontinued         03/18/23 1620     Place NIKITA hose  Until discontinued         03/18/23 1620                    Discharge Planning   NISA:      Code Status: Full Code   Is the patient medically ready for discharge?:     Reason for patient still in hospital (select all that apply): Treatment  Discharge Plan A: Home with family      45 minutes critical care time so far today            Kodi Escoto MD  Department of Hospital Medicine   [facility]

## 2023-03-19 NOTE — SUBJECTIVE & OBJECTIVE
Past Medical History:   Diagnosis Date    Cancer     skin to Rt forearm and face    COPD (chronic obstructive pulmonary disease)     Hyperlipidemia     Hypertension     Pseudoaneurysm        Past Surgical History:   Procedure Laterality Date    ABDOMINAL SURGERY      stents placed in liver and large intestines, per patient    CAROTID STENT      COLONOSCOPY N/A 09/27/2022    Procedure: COLONOSCOPY;  Surgeon: Donnie Peterson MD;  Location: [facility] ENDO (2ND FLR);  Service: Endoscopy;  Laterality: N/A;    COLONOSCOPY N/A 09/30/2022    Procedure: COLONOSCOPY;  Surgeon: Joy Cabrera MD;  Location: [facility] ENDO (2ND FLR);  Service: Endoscopy;  Laterality: N/A;    CORONARY STENT PLACEMENT  01/2000    DISSECTION OF NECK Bilateral 01/04/2022    Procedure: DISSECTION, NECK;  Surgeon: Naeem Smalls MD;  [facility];  Service: ENT;  Laterality: Bilateral;    ESOPHAGOGASTRODUODENOSCOPY N/A 09/27/2022    Procedure: EGD (ESOPHAGOGASTRODUODENOSCOPY);  Surgeon: Donnie Peterson MD;  Location: [facility] ENDO (2ND FLR);  Service: Endoscopy;  Laterality: N/A;    ESOPHAGOGASTRODUODENOSCOPY N/A 09/30/2022    Procedure: EGD (ESOPHAGOGASTRODUODENOSCOPY);  Surgeon: Joy Cabrera MD;  Location: [facility] ENDO (2ND FLR);  Service: Endoscopy;  Laterality: N/A;    ESOPHAGOGASTRODUODENOSCOPY W/ PEG N/A 01/04/2022    Procedure: EGD, WITH PEG TUBE INSERTION;  Surgeon: Anthony Calabrese MD;  [facility];  Service: General;  Laterality: N/A;    EYE SURGERY      Cataract bilateral    femoral stents      bilateral    FLAP PROCEDURE N/A 01/03/2022    Procedure: CREATION, FREE FLAP;  Surgeon: Naeem Smalls MD;  [facility];  Service: ENT;  Laterality: N/A;    FLAP PROCEDURE Right 01/04/2022    Procedure: CREATION, FREE FLAP;  Surgeon: Stacey Conde MD;  [facility];  Service: ENT;  Laterality: Right;  Ischemic start 1203  Ischemic stop 1353    GLOSSECTOMY N/A 01/04/2022    Procedure: GLOSSECTOMY;   Surgeon: Naeem Smalls MD;  Location: [facility] OR [address] [place] [address];  Service: ENT;  Laterality: N/A;    RADICAL NECK DISSECTION Left 2022    Procedure: DISSECTION, NECK, RADICAL;  Surgeon: Naeem Smalls MD;  Location: [facility] OR [address] [place] [address];  Service: ENT;  Laterality: Left;    SKIN BIOPSY      SKIN CANCER EXCISION      TRACHEOTOMY N/A 2022    Procedure: TRACHEOTOMY;  Surgeon: Naeem Smalls MD;  Location: [facility] OR [address] [place] [address];  Service: ENT;  Laterality: N/A;    VASCULAR SURGERY         Review of patient's allergies indicates:  No Known Allergies    Family History    None       Tobacco Use    Smoking status: Former     Packs/day: 2.00     Years: 40.00     Pack years: 80.00     Types: Cigarettes     Start date: 1963     Quit date: 2018     Years since quittin.9    Smokeless tobacco: Never    Tobacco comments:     3/3 ppd x 40 yrs. Currently 3-4 cigarettes daily .He is trying  to quit. Is using a Vapor cigarettes  2-3 x's a day.   Substance and Sexual Activity    Alcohol use: Not Currently    Drug use: No    Sexual activity: Not Currently         Review of Systems   Constitutional:  Positive for fatigue. Negative for activity change, chills, diaphoresis and fever.   Respiratory:  Positive for cough. Negative for choking, chest tightness, shortness of breath and wheezing.         Hemoptysis per tracheostomy.   Cardiovascular:  Negative for chest pain and leg swelling.   Objective:     Vital Signs (Most Recent):  Temp: 98.2 °F (36.8 °C) (23)  Pulse: 107 (23)  Resp: (!) 22 (23)  BP: 109/69 (23)  SpO2: (!) 94 % (23)   Vital Signs (24h Range):  Temp:  [97.6 °F (36.4 °C)-98.2 °F (36.8 °C)] 98.2 °F (36.8 °C)  Pulse:  [] 107  Resp:  [17-29] 22  SpO2:  [87 %-99 %] 94 %  BP: ()/(42-82) 109/69     Weight: 68 kg (150 lb)  Body mass index is 22.81 kg/m².      Intake/Output Summary (Last 24 hours) at 3/19/2023 1822  Last data filed at 3/19/2023  1700  Gross per 24 hour   Intake 2780.97 ml   Output 945 ml   Net 1835.97 ml       Physical Exam  Nursing note reviewed.   Constitutional:       General: He is not in acute distress.     Appearance: He is ill-appearing. He is not toxic-appearing.   Neck:      Comments: Tracheostomy in place.  Cardiovascular:      Rate and Rhythm: Normal rate and regular rhythm.      Heart sounds: Normal heart sounds. No murmur heard.    No gallop.   Pulmonary:      Effort: Pulmonary effort is normal.      Breath sounds: No wheezing, rhonchi or rales.   Abdominal:      General: There is no distension.      Palpations: Abdomen is soft.      Tenderness: There is no abdominal tenderness.   Musculoskeletal:      Right lower leg: No edema.      Left lower leg: No edema.   Neurological:      Mental Status: He is alert and oriented to person, place, and time. Mental status is at baseline.   Psychiatric:         Mood and Affect: Mood normal.         Thought Content: Thought content normal.       Vents:  Oxygen Concentration (%): 40 (03/19/23 1705)    Lines/Drains/Airways       Drain  Duration                  Gastrostomy/Enterostomy 01/04/22 Percutaneous endoscopic gastrostomy (PEG)  days              Airway  Duration             Adult Surgical Airway 03/16/23 1014 Shiley Uncuffed 6.0 3 days              Peripheral Intravenous Line  Duration                  Midline Catheter Insertion/Assessment  - Single Lumen 03/19/23 1335 Right basilic vein (medial side of arm) other (see comments) <1 day         Peripheral IV - Single Lumen 03/19/23 1300 20 G Right Antecubital <1 day                    Significant Labs:    CBC/Anemia Profile:  Recent Labs   Lab 03/18/23  1106 03/18/23  1410 03/19/23  0413 03/19/23  1445   WBC 10.64  --  9.90 8.74   HGB 7.6*  --  6.5* 7.2*   HCT 24.5*  --  21.9* 22.7*     --  206 203   MCV 91  --  94 93   RDW 14.0  --  14.7* 15.4*   IRON  --  57  --   --    FERRITIN 84  --   --   --    FOLATE  --  16.5  --    --    [id number]  --  1079*  --   --         Chemistries:  Recent Labs   Lab 03/18/23  1106 03/19/23  0413    144   K 4.6 4.8    109   CO2 32* 29   BUN 50* 44*   CREATININE 0.7 0.7   CALCIUM 9.2 8.2*   ALBUMIN 2.9* 2.4*   PROT 6.7 5.5*   BILITOT 0.2 0.4   ALKPHOS 90 65   ALT 16 15   AST 16 22       All pertinent labs within the past 24 hours have been reviewed.    Significant Imaging:   I have reviewed all pertinent imaging results/findings within the past 24 hours.

## 2023-03-19 NOTE — AI DETERIORATION ALERT
Artificial Intelligence Notification      Admit Date: 3/18/2023  LOS: 0  Code Status: Full Code   Date of Consult: 2023  : 1950  Age: 73 y.o.  Weight:   Wt Readings from Last 1 Encounters:   23 68 kg (150 lb)     Sex: male  Bed: [facility]/W9 A:   MRN: [id number]  Attending Physician: Kodi Escoto MD  Primary Service: Networked reference to record PCT   Time AI Alert Received: 11:37am  Time at Bedside: 11:38 am           Patient found laying in bed, appears in no acute distress.  Appears comfortable.  Wife at bedside.  Currently has blood transfusion going on.  Patient is alert and oriented x3.  Appears very ill and weak.  Denies any headaches or vision changes.    Manual blood pressure obtain and confirm it is 70s/40s. Has a faint pulse. No further bleeding from trach. Will update attending provider. Patient will likely need to go to ICU for closer monitoring and would benefit from pulmonology consult.       Vital Signs (Most Recent):  Temp: 97.9 °F (36.6 °C) (23 1125)  Pulse: 97 (23 1125)  Resp: 18 (23 1125)  BP: (!) 70/42 (23 1125)  SpO2: (!) 94 % (23 1125)   Vital Signs (24h Range):  Temp:  [97.8 °F (36.6 °C)-99 °F (37.2 °C)] 97.9 °F (36.6 °C)  Pulse:  [] 97  Resp:  [16-20] 18  SpO2:  [87 %-100 %] 94 %  BP: ()/(40-68) 70/42         This encounter was triggered by an Artificial Intelligence Notification.     Artificial Intelligence alert discussed with Primary team:  Name Dr. Kodi Escoto

## 2023-03-19 NOTE — NURSING
Consulted for PICC line placement, RUE attempted however I was unable to advance the last 10cm of PICC line, Sterile over wire performed to Midline with no issues.

## 2023-03-19 NOTE — NURSING
RAPID RESPONSE NURSE NOTE        Admit Date: 3/18/2023  LOS: 0  Code Status: Full Code   Date of Consult: 2023  : 1950  Age: 73 y.o.  Weight:   Wt Readings from Last 1 Encounters:   23 68 kg (150 lb)     Sex: male  Race: White   Bed: [place] [address] A:   MRN: [id number]  Time Rapid Response Team page Received: 0755  Time Rapid Response Team at Bedside: 0755  Time Rapid Response Team left Bedside: 0815  Was the patient discharged from an ICU this admission? No   Was the patient discharged from a PACU within last 24 hours? No   Did the patient receive conscious sedation/general anesthesia in last 24 hours? No   Was the patient in the ED within the past 24 hours? Yes   Was the patient on NIPPV within the past 24 hours? No   Did this progress into an ARC or CPA:  Acute Respiratory Compromise  Attending Physician: Kodi Escoto MD  Primary Service: Hospitalist     SITUATION     Notified by overhead page.  Reason for alert: bloody secretions  Called to evaluate the patient for Respiratory    Why is the patient in the hospital?: Anemia    Patient has a past medical history of Cancer, COPD (chronic obstructive pulmonary disease), Hyperlipidemia, Hypertension, and Pseudoaneurysm.    Last Vitals:  Temp: 98.2 °F (36.8 °C) (755)  Pulse: 90 ( 08)  Resp: 18 (755)  BP: 90/50 (751)  SpO2: 91 % (812) Comment: goal >88%    24 Hours Vitals Range:  Temp:  [97.8 °F (36.6 °C)-99 °F (37.2 °C)]   Pulse:  []   Resp:  [16-20]   BP: ()/(40-68)   SpO2:  [87 %-100 %]     Labs:  Recent Labs     23  1106 23  0413   WBC 10.64 9.90   HGB 7.6* 6.5*   HCT 24.5* 21.9*    206       Recent Labs     23  1106 23  0413    144   K 4.6 4.8    109   CO2 32* 29   CREATININE 0.7 0.7   * 132*        No results for input(s): PH, PCO2, PO2, HCO3, POCSATURATED, BE in the last 72 hours.     ASSESSMENT/INTERVENTIONS    The patient was seen for a Respiratory  problem. Staff concerns included oxygen saturation < 90% despite supplemental oxygen. The following interventions were performed: PRN suctioning.    RECOMMENDATIONS    We recommend:monitor possible transfer to ICU    Discussed plan of care with bedside RNNeyda RN    PROVIDER ESCALATION    Orders received and case discussed with Dr. Escoto .    Disposition: Remain in room 339    FOLLOW UP  Call the Rapid Response NurseAretha at x [phone] for additional questions or concerns.

## 2023-03-19 NOTE — ASSESSMENT & PLAN NOTE
-Admitted to inpatient status  -Now with hypoxia, hypotension and bleeding tracheostomy - moving to ICU  -Prior clinic notes have shown soft blood pressure - but not this low.  -Etiology unclear - concerning for hemorrhage given bleeding at trach site and anemia.  No clear evidence of infection (no fever, leukocytosis and negative procalcitonin).  Initial troponin negative  -Check lactic acid, d-dimer, troponin, EKG and CTA chest stat.  -Hb 7.6 on admit and 6.5 today - Continue blood transfusion actively infusing  -Bolus 1L NS  -Order picc line and levophed available if needed.  -Consider empiric broad spectrum antibiotics  -Consult pulmonology critical care and ENT.  No ENT here today so have call into [facility].  -Order tranexamic acid nebulizer.  -Transfer to ICU now.

## 2023-03-19 NOTE — ASSESSMENT & PLAN NOTE
Likely secondary to lower respiratory tract infection in the setting of ASA/Plavix.  It seems less likely to be physical complication of the tracheostomy tube itself.    · Antibiotics for pneumonia.  · Check sputum for culture, including AFB for possible MAC infection.  · Respiratory viral panel.  · Hold anti-platelet medications for now => bleeding risk presently >> vascular risk  · Nebulized TXA is reasonable  · ENT evaluation is planned

## 2023-03-19 NOTE — ASSESSMENT & PLAN NOTE
-BP typically in the 90s to low 100s systolic per chart review.   -Now with worse hypotension  -Holding home metoprolol and amlodipine

## 2023-03-19 NOTE — PROGRESS NOTES
Pharmacokinetic Initial Assessment: IV Vancomycin    Assessment/Plan:    Initiate intravenous vancomycin with loading dose of 1750 mg once followed by a maintenance dose of vancomycin 1000mg IV every 12 hours  Desired empiric serum trough concentration is 10 to 20 mcg/mL  Draw vancomycin trough level 60 min prior to fourth dose on 3/21 at approximately 0200  Pharmacy will continue to follow and monitor vancomycin.      Please contact pharmacy at extension [phone] with any questions regarding this assessment.     Thank you for the consult,   Erin Burgos       Patient brief summary:  Fareed Richard Jr. is a 73 y.o. male initiated on antimicrobial therapy with IV Vancomycin for treatment of suspected  sepsis    Drug Allergies:   Review of patient's allergies indicates:  No Known Allergies    Actual Body Weight:   68 kg    Renal Function:   Estimated Creatinine Clearance: 89 mL/min (based on SCr of 0.7 mg/dL).,     Dialysis Method (if applicable):  N/A    CBC (last 72 hours):  Recent Labs   Lab Result Units 03/18/23  1106 03/19/23  0413 03/19/23  1445   WBC K/uL 10.64 9.90 8.74   Hemoglobin g/dL 7.6* 6.5* 7.2*   Hematocrit % 24.5* 21.9* 22.7*   Platelets K/uL 221 206 203   Gran % % 80.2* 81.1*  --    Lymph % % 7.0* 6.3*  --    Mono % % 9.9 11.4  --    Eosinophil % % 2.2 0.3  --    Basophil % % 0.3 0.3  --    Differential Method  Automated Automated  --        Metabolic Panel (last 72 hours):  Recent Labs   Lab Result Units 03/18/23  1106 03/18/23  1400 03/19/23  0413   Sodium mmol/L 142  --  144   Potassium mmol/L 4.6  --  4.8   Chloride mmol/L 101  --  109   CO2 mmol/L 32*  --  29   Glucose mg/dL 137*  --  132*   Glucose, UA   --  Negative  --    BUN mg/dL 50*  --  44*   Creatinine mg/dL 0.7  --  0.7   Albumin g/dL 2.9*  --  2.4*   Total Bilirubin mg/dL 0.2  --  0.4   Alkaline Phosphatase U/L 90  --  65   AST U/L 16  --  22   ALT U/L 16  --  15       Drug levels (last 3 results):  No results for input(s): VANCOMYCINRA,  VANCORANDOM, VANCOMYCINPE, VANCOPEAK, VANCOMYCINTR, VANCOTROUGH in the last 72 hours.    Microbiologic Results:  Microbiology Results (last 7 days)       Procedure Component Value Units Date/Time    Blood culture [[id number]] Collected: 03/19/23 1307    Order Status: Sent Specimen: Blood from Hand Updated: 03/19/23 1318    Blood culture [[id number]] Collected: 03/19/23 1307    Order Status: Sent Specimen: Blood from Antecubital, Right Hand Updated: 03/19/23 1318    Urine culture [[id number]]  (Abnormal) Collected: 03/18/23 1400    Order Status: Completed Specimen: Urine Updated: 03/19/23 0756     Urine Culture, Routine ENTEROCOCCUS SPECIES  >100,000 cfu/ml  Identification and susceptibility pending      Narrative:      Specimen Source->Urine

## 2023-03-19 NOTE — SUBJECTIVE & OBJECTIVE
"Interval History: No acute events overnight.  BP has been stable.  No further bleeding from his trach.  Noted bump in troponin last night but no chest pain.  He notes he is not sleeping well in the hospital and is asking for medication for "panicking".  Denies any pain or shortness of breath.  Back pain is well controlled with pain medication.  All questions answered and patient had no further complaints.    Objective:     Vital Signs (Most Recent):  Temp: 98.3 °F (36.8 °C) (03/20/23 1115)  Pulse: 91 (03/20/23 1100)  Resp: (!) 21 (03/20/23 1100)  BP: 112/68 (03/20/23 1100)  SpO2: (!) 92 % (03/20/23 1100)   Vital Signs (24h Range):  Temp:  [97.6 °F (36.4 °C)-98.5 °F (36.9 °C)] 98.3 °F (36.8 °C)  Pulse:  [] 91  Resp:  [16-29] 21  SpO2:  [90 %-100 %] 92 %  BP: ()/(46-83) 112/68     Weight: 68 kg (150 lb)  Body mass index is 22.81 kg/m².    Intake/Output Summary (Last 24 hours) at 3/20/2023 1150  Last data filed at 3/20/2023 0721  Gross per 24 hour   Intake 2628.89 ml   Output 1575 ml   Net 1053.89 ml        Physical Exam  Vitals and nursing note reviewed.   Constitutional:       General: He is not in acute distress.     Appearance: He is well-developed. He is ill-appearing. He is not diaphoretic.      Comments: disheveled   HENT:      Head: Normocephalic and atraumatic.      Right Ear: External ear normal.      Left Ear: External ear normal.   Eyes:      General:         Right eye: No discharge.         Left eye: No discharge.      Conjunctiva/sclera: Conjunctivae normal.   Neck:      Thyroid: No thyromegaly.      Comments: Trach in place.  No evidence of bleeding now.  Cardiovascular:      Rate and Rhythm: Normal rate and regular rhythm.      Heart sounds: No murmur heard.  Pulmonary:      Effort: Pulmonary effort is normal. No respiratory distress.      Breath sounds: Normal breath sounds.   Abdominal:      General: Bowel sounds are normal. There is no distension.      Palpations: Abdomen is soft. There " is no mass.      Tenderness: There is no abdominal tenderness.      Comments: Peg tube in place   Musculoskeletal:         General: No deformity.      Cervical back: Normal range of motion and neck supple.   Skin:     General: Skin is warm and dry.   Neurological:      Mental Status: He is alert and oriented to person, place, and time.      Comments: Symmetric movement of BUE and BLE against gravity. Attempts to speak and communicates by writing in his notebook.  Diffusely weak   Psychiatric:      Comments: Anxious       Significant Labs: All pertinent labs within the past 24 hours have been reviewed.    Significant Imaging: I have reviewed all pertinent imaging results/findings within the past 24 hours.

## 2023-03-19 NOTE — ASSESSMENT & PLAN NOTE
-Denies chest pain but does have severe back/shoulder pain  -Check EKG, troponin, ddimer, stat echo and stat cta chest  -Holding aspirin and metoprolol given hypotension  -Continue statin

## 2023-03-19 NOTE — HPI
"Asked to evaluate for "hypotension and bleeding from trach site (replaced a few days ago)".    He was seen in ENT clinic by Dr. Smalls on 3/16 for routinely scheduled follow up and trach change.  He was having some thick yellow secretions with perhaps a hint of pink from the trach at time even before the trach exchange.  Subsequently, he was noted to have increase in congestion with more bloody-looking sputum.  He denies fever/chills/night sweats but has been more congested with dyspnea noted.  The day of presentation to the ED (3/18), secretions were frankly bloody => so he came to the ED.  He denies prior similar episodes.  He is on chronic aspirin and Plavix due to severe vascular disease with past stenting.    Review of past chest CT from October 2022 shows severe peripheral bullous changes, as well as centrilobular emphysema changes.  There is a modest left pleural effusion and left upper lobe nodular opacity.  There is bronchiectasis and diffuse micronodular changes in a tree-in-bud configuration to suggest chronic small airway inflammation/infection.  Chest CT at this time shows similar findings but now shows increased bibasilar air-space disease with consolidation/air bronchograms consistent with pneumonia.  Procalcitonin is normal.  Troponin has bumped up since admission to suggest possible demand ischemia in the setting of worsening anemia and hypotension.        Patient previously seen in Pulmonary Clinic by Dr. Wadsworth in October 2022.  From Dr. Wadsworth's note:    Fareed Richard Jr. is a 72 y.o. male who presents for evaluation of chronic hypoxemic respiratory failure. He coughs daily, particularly in the evening. The cough is productive of yellow mucus which he expectorates without difficult via his trach. He did not experience chronic cough prior to his trach. He experiences chronic dyspnea on exertion. He takes wixela inhaler 1 puff BID (still inhales through his mouth) & an albuterol nebulizer. He has " been taking prednisone for the past six weeks (for copd?). He takes glycopyrrolate due to constant oral secretions.      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 S/p tracheostomy, neck dissection, & total glossectomy January 2022.     HeSince his last visit he was hospitalized for acute respiratory failure requiring intubation and had an extended hospital course; admitted 4/19/22 and discharged 5/12/22.  3 month post-treatment PET CT reviewed; no evidence of local residual or recurrent disease.  Pulmonary findings likely reflect sequelae of recent hospitalization.  Concern for active inflammation going on; agree with starting steroids and getting PFTs.  Will need repeat CT chest in 8 weeks and follow up with pulmonology.     PMH: CAD status post RCA PCI in 2000, carotid stenosis status post carotid enterectomy in 2018, PAD status post bilateral femoral atherectomy and hypertension.     He is a former 2PPD smoker; he quit six years ago.

## 2023-03-19 NOTE — ASSESSMENT & PLAN NOTE
-Continue home glycopyrrolate and guaifensin  -Continue oxygen  -PRN cold suctioning given hemoptysis

## 2023-03-19 NOTE — CARE UPDATE
Vitals have improved.  Noted bump in troponin to 1.7.  EKG without jamil ischemic changes, but did note significant left back pain which is worrisome.  Hopefully bump in troponin was due to demand ischemia from earlier hypotension and anemia and not ACS.  Hb has improved to 7.2 after PRBC.  Reviewed CTA chest with pulmonology - unclear if findings represent mucus or pneumonia.  Will continue antibiotics.  Discussed with ENT on call, Dr. Louis Shah, and as long as not actively bleeding OK for patient to stay at  for ENT evaluation tomorrow.  Dr. Shah was in agreement with TXA nebulizers.  Has not required pressors and has had no further hemoptysis.      Plan update:  -Given bump in troponin and concern for coronary ischemia will transfuse another unit PRBC with goal to get Hb > 8.  -Repeat troponin this evening and in AM.  -Due to anemia and bleeding cannot use aspirin or plavix.  Already on statin.  Given hypotension earlier today will not start BB either.  -Consult cardiology now  -Await stat echocardiogram  -If further bleeding / hemoptysis tonight would consider transfer to [facility] [place].  Otherwise will have ENT consult here tomorrow.  I have alerted Dr. French of the consult for tomorrow.  -Continue care with close monitoring in ICU.

## 2023-03-19 NOTE — CARE UPDATE
RN called for pt desaturation. Upon arrival pt on 10L/40% trach collar, SpO2 89%. Pt did not appear in respiratory distress but did have rhonchi BS. Asked if pt would like to be suctioned, pt shook his head yes.   First suction pass, successful - thick tan secretions noted. After 2nd pass pt spit out oral bloody secretions. Pt then began to cough bloody secretions from trach. Pt settings increased to 10L/98% trach collar for recovery, RN notified and rapid response was called.

## 2023-03-19 NOTE — ASSESSMENT & PLAN NOTE
Tracheostomy in place since surgical resection for head/neck cancer in January 2022.  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    · Undergoing swallowing evaluation/training as per Speech Therapy

## 2023-03-19 NOTE — NURSING
Upon 15 min post start of vital check during blood administration, patients BP decreased to 70/42 HR 97. Aretha rapid nurse notified, reached out to Dr Escoto, orders to transfer to ICU.

## 2023-03-19 NOTE — ASSESSMENT & PLAN NOTE
-Patient with chronic hypoxia and states at home is on 5L O2 via trach and O2 sats typically in high 80s low 90s  -Sats presently similar, but is having episodes of hemoptysis  -Transfering to ICU as above  -Consulting pulm-critical care and call out to ENT at [facility] [place] [place] to discuss  -TXA nebs ordered and transfusing prbc.  -Continue oxygen via trache

## 2023-03-19 NOTE — NURSING
[facility] [place] [place]-  Rapid Response Nurse Intervention/ Task    Date of Visit: 03/19/2023  Time of Visit: 1115       INTERVENTIONS/ TASK Completed:     MEWS monitoring. Pt noted to have low BP 70's. I went to bedside and pt receiving unit of PRBC at this time.Message to Dr. Escoto about pulmonary consult and low BP. New orders received. Pt and wife aware of transfer to ICU and CTA chest. Pt transported to CT via bed with Charge nurse Kirsten TRIANA and this nurse. Pt tolerated well.

## 2023-03-19 NOTE — ASSESSMENT & PLAN NOTE
Patient with Hypoxic Respiratory failure which is Acute on chronic.  he is on home oxygen at 2-3 LPM. Supplemental oxygen was provided and noted- Oxygen Concentration (%):  [28-98] 40.   Signs/symptoms of respiratory failure include- increased work of breathing and hemoptysis. Contributing diagnoses includes - Aspiration and Pneumonia Labs and images were reviewed. Patient Has not had a recent ABG. Will treat underlying causes and adjust management of respiratory failure as follows-     Hemoptysis with increased bilateral infiltrates on chest CTA in the setting of likely aspiration (attempting swallow training with Speech) and chronic aspirin/Plavix.  Although procalcitonin is normal, imaging and history warrant treatment with antibiotics for presumed nosocomial pneumonia.  Moved to ICU due to labile BP and increased oxygen requirements.  After receiving one unit of packed red blood cells, he looks much better and BP is improved.    · Treat for pneumonia (nosocomial pathogens)  · Nebulized bronchodilators given COPD/emphysema changes  · Titrate oxygen as needed => no current indication for ventilatory support; if needed, he will require trach change to a cuffed trach tube.  · Receiving TXA nebulizer treatment for hemoptysis  · Hold Plavix/ASA for now

## 2023-03-19 NOTE — NURSING
[facility] [place]  Rapid Response Nurse Intervention/ Task    Date of Visit: 03/18/2023  Time of Visit: 1930       INTERVENTIONS/ TASK Completed:     Upon assessment patient is in bed, wife at bedside. He is currently receiving a unit of blood. Blood pressure cuff changed to a more appropriate size and blood pressure rechecked: 103/66.

## 2023-03-19 NOTE — CONSULTS
"[facility] Intensive Care  Pulmonology  Consult Note    Patient Name: Fareed Richard Jr.  MRN: [id number]  Admission Date: 3/18/2023  Hospital Length of Stay: 0 days  Code Status: Full Code  Attending Physician: Kodi Escoto MD  Primary Care Provider: Kodi Tubbs MD   Principal Problem: Hypotension    [unfilled]  Subjective:     HPI:  Asked to evaluate for "hypotension and bleeding from trach site (replaced a few days ago)".    He was seen in ENT clinic by Dr. Smalls on 3/16 for routinely scheduled follow up and trach change.  He was having some thick yellow secretions with perhaps a hint of pink from the trach at time even before the trach exchange.  Subsequently, he was noted to have increase in congestion with more bloody-looking sputum.  He denies fever/chills/night sweats but has been more congested with dyspnea noted.  The day of presentation to the ED (3/18), secretions were frankly bloody => so he came to the ED.  He denies prior similar episodes.  He is on chronic aspirin and Plavix due to severe vascular disease with past stenting.    Review of past chest CT from October 2022 shows severe peripheral bullous changes, as well as centrilobular emphysema changes.  There is a modest left pleural effusion and left upper lobe nodular opacity.  There is bronchiectasis and diffuse micronodular changes in a tree-in-bud configuration to suggest chronic small airway inflammation/infection.  Chest CT at this time shows similar findings but now shows increased bibasilar air-space disease with consolidation/air bronchograms consistent with pneumonia.  Procalcitonin is normal.  Troponin has bumped up since admission to suggest possible demand ischemia in the setting of worsening anemia and hypotension.        Patient previously seen in Pulmonary Clinic by Dr. Wadsworth in October 2022.  From Dr. Wadsworth's note:    Fareed Richard Jr. is a 72 y.o. male who presents for evaluation of chronic hypoxemic respiratory failure. " He coughs daily, particularly in the evening. The cough is productive of yellow mucus which he expectorates without difficult via his trach. He did not experience chronic cough prior to his trach. He experiences chronic dyspnea on exertion. He takes wixela inhaler 1 puff BID (still inhales through his mouth) & an albuterol nebulizer. He has been taking prednisone for the past six weeks (for copd?). He takes glycopyrrolate due to constant oral secretions.      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 S/p tracheostomy, neck dissection, & total glossectomy January 2022.     HeSince his last visit he was hospitalized for acute respiratory failure requiring intubation and had an extended hospital course; admitted 4/19/22 and discharged 5/12/22.  3 month post-treatment PET CT reviewed; no evidence of local residual or recurrent disease.  Pulmonary findings likely reflect sequelae of recent hospitalization.  Concern for active inflammation going on; agree with starting steroids and getting PFTs.  Will need repeat CT chest in 8 weeks and follow up with pulmonology.     PMH: CAD status post RCA PCI in 2000, carotid stenosis status post carotid enterectomy in 2018, PAD status post bilateral femoral atherectomy and hypertension.     He is a former 2PPD smoker; he quit six years ago.        Past Medical History:   Diagnosis Date    Cancer     skin to Rt forearm and face    COPD (chronic obstructive pulmonary disease)     Hyperlipidemia     Hypertension     Pseudoaneurysm        Past Surgical History:   Procedure Laterality Date    ABDOMINAL SURGERY      stents placed in liver and large intestines, per patient    CAROTID STENT      COLONOSCOPY N/A 09/27/2022    Procedure: COLONOSCOPY;  Surgeon: Donnie Peterson MD;  Location: [facility] ([address] [place] [address]);  Service: Endoscopy;  Laterality: N/A;    COLONOSCOPY N/A 09/30/2022    Procedure: COLONOSCOPY;  Surgeon: Joy Cabrera MD;  Location: [facility]  ENDO (2ND FLR);  Service: Endoscopy;  Laterality: N/A;    CORONARY STENT PLACEMENT  01/2000    DISSECTION OF NECK Bilateral 01/04/2022    Procedure: DISSECTION, NECK;  Surgeon: Naeem Smalls MD;  Location: [facility] OR [facility] FLR;  Service: ENT;  Laterality: Bilateral;    ESOPHAGOGASTRODUODENOSCOPY N/A 09/27/2022    Procedure: EGD (ESOPHAGOGASTRODUODENOSCOPY);  Surgeon: Donnie Peterson MD;  Location: [facility] ENDO (2ND FLR);  Service: Endoscopy;  Laterality: N/A;    ESOPHAGOGASTRODUODENOSCOPY N/A 09/30/2022    Procedure: EGD (ESOPHAGOGASTRODUODENOSCOPY);  Surgeon: Joy Cabrera MD;  Location: [facility] ENDO (2ND FLR);  Service: Endoscopy;  Laterality: N/A;    ESOPHAGOGASTRODUODENOSCOPY W/ PEG N/A 01/04/2022    Procedure: EGD, WITH PEG TUBE INSERTION;  Surgeon: Anthony Calabrese MD;  [address]  Service: General;  Laterality: N/A;    EYE SURGERY      Cataract bilateral    femoral stents      bilateral    FLAP PROCEDURE N/A 01/03/2022    Procedure: CREATION, FREE FLAP;  Surgeon: Naeem Smalls MD;  [facility];  Service: ENT;  Laterality: N/A;    FLAP PROCEDURE Right 01/04/2022    Procedure: CREATION, FREE FLAP;  Surgeon: Stacey Conde MD;  [facility];  Service: ENT;  Laterality: Right;  Ischemic start 1203  Ischemic stop 1353    GLOSSECTOMY N/A 01/04/2022    Procedure: GLOSSECTOMY;  Surgeon: Naeem Smalls MD;  [facility];  Service: ENT;  Laterality: N/A;    RADICAL NECK DISSECTION Left 01/03/2022    Procedure: DISSECTION, NECK, RADICAL;  Surgeon: Naeem Smalls MD;  [facility];  Service: ENT;  Laterality: Left;    SKIN BIOPSY      SKIN CANCER EXCISION      TRACHEOTOMY N/A 01/04/2022    Procedure: TRACHEOTOMY;  Surgeon: Naeem Smalls MD;  [facility];  Service: ENT;  Laterality: N/A;    VASCULAR SURGERY         Review of patient's allergies indicates:  No Known Allergies    Family History    None        Tobacco Use    Smoking status: Former     Packs/day: 2.00     Years: 40.00     Pack years: 80.00     Types: Cigarettes     Start date: 1963     Quit date: 2018     Years since quittin.9    Smokeless tobacco: Never    Tobacco comments:     3/3 ppd x 40 yrs. Currently 3-4 cigarettes daily .He is trying  to quit. Is using a Vapor cigarettes  2-3 x's a day.   Substance and Sexual Activity    Alcohol use: Not Currently    Drug use: No    Sexual activity: Not Currently         Review of Systems   Constitutional:  Positive for fatigue. Negative for activity change, chills, diaphoresis and fever.   Respiratory:  Positive for cough. Negative for choking, chest tightness, shortness of breath and wheezing.         Hemoptysis per tracheostomy.   Cardiovascular:  Negative for chest pain and leg swelling.   Objective:     Vital Signs (Most Recent):  Temp: 98.2 °F (36.8 °C) (23)  Pulse: 107 (23)  Resp: (!) 22 (23)  BP: 109/69 (23)  SpO2: (!) 94 % (23)   Vital Signs (24h Range):  Temp:  [97.6 °F (36.4 °C)-98.2 °F (36.8 °C)] 98.2 °F (36.8 °C)  Pulse:  [] 107  Resp:  [17-29] 22  SpO2:  [87 %-99 %] 94 %  BP: ()/(42-82) 109/69     Weight: 68 kg (150 lb)  Body mass index is 22.81 kg/m².      Intake/Output Summary (Last 24 hours) at 3/19/2023 1822  Last data filed at 3/19/2023 1700  Gross per 24 hour   Intake 2780.97 ml   Output 945 ml   Net 1835.97 ml       Physical Exam  Nursing note reviewed.   Constitutional:       General: He is not in acute distress.     Appearance: He is ill-appearing. He is not toxic-appearing.   Neck:      Comments: Tracheostomy in place.  Cardiovascular:      Rate and Rhythm: Normal rate and regular rhythm.      Heart sounds: Normal heart sounds. No murmur heard.    No gallop.   Pulmonary:      Effort: Pulmonary effort is normal.      Breath sounds: No wheezing, rhonchi or rales.   Abdominal:      General: There is no  distension.      Palpations: Abdomen is soft.      Tenderness: There is no abdominal tenderness.   Musculoskeletal:      Right lower leg: No edema.      Left lower leg: No edema.   Neurological:      Mental Status: He is alert and oriented to person, place, and time. Mental status is at baseline.   Psychiatric:         Mood and Affect: Mood normal.         Thought Content: Thought content normal.       Vents:  Oxygen Concentration (%): 40 (03/19/23 1705)    Lines/Drains/Airways       Drain  Duration                  Gastrostomy/Enterostomy 01/04/22 Percutaneous endoscopic gastrostomy (PEG)  days              Airway  Duration             Adult Surgical Airway 03/16/23 1014 Shiley Uncuffed 6.0 3 days              Peripheral Intravenous Line  Duration                  Midline Catheter Insertion/Assessment  - Single Lumen 03/19/23 1335 Right basilic vein (medial side of arm) other (see comments) <1 day         Peripheral IV - Single Lumen 03/19/23 1300 20 G Right Antecubital <1 day                    Significant Labs:    CBC/Anemia Profile:  Recent Labs   Lab 03/18/23  1106 03/18/23  1410 03/19/23  0413 03/19/23  1445   WBC 10.64  --  9.90 8.74   HGB 7.6*  --  6.5* 7.2*   HCT 24.5*  --  21.9* 22.7*     --  206 203   MCV 91  --  94 93   RDW 14.0  --  14.7* 15.4*   IRON  --  57  --   --    FERRITIN 84  --   --   --    FOLATE  --  16.5  --   --    [id number]  --  1079*  --   --         Chemistries:  Recent Labs   Lab 03/18/23  1106 03/19/23  0413    144   K 4.6 4.8    109   CO2 32* 29   BUN 50* 44*   CREATININE 0.7 0.7   CALCIUM 9.2 8.2*   ALBUMIN 2.9* 2.4*   PROT 6.7 5.5*   BILITOT 0.2 0.4   ALKPHOS 90 65   ALT 16 15   AST 16 22       All pertinent labs within the past 24 hours have been reviewed.    Significant Imaging:   I have reviewed all pertinent imaging results/findings within the past 24 hours.      ABG  No results for input(s): PH, PO2, PCO2, HCO3, BE in the last 168  hours.  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    · Undergoing swallowing evaluation/training as per Speech Therapy    Pulmonary  Hemoptysis  Likely secondary to lower respiratory tract infection in the setting of ASA/Plavix.  It seems less likely to be physical complication of the tracheostomy tube itself.    · Antibiotics for pneumonia.  · Check sputum for culture, including AFB for possible MAC infection.  · Respiratory viral panel.  · Hold anti-platelet medications for now => bleeding risk presently >> vascular risk  · Nebulized TXA is reasonable  · ENT evaluation is planned    Chronic respiratory failure with hypoxia, on home O2 therapy  Patient with Hypoxic Respiratory failure which is Acute on chronic.  he is on home oxygen at 2-3 LPM. Supplemental oxygen was provided and noted- Oxygen Concentration (%):  [28-98] 40.   Signs/symptoms of respiratory failure include- increased work of breathing and hemoptysis. Contributing diagnoses includes - Aspiration and Pneumonia Labs and images were reviewed. Patient Has not had a recent ABG. Will treat underlying causes and adjust management of respiratory failure as follows-     Hemoptysis with increased bilateral infiltrates on chest CTA in the setting of likely aspiration (attempting swallow training with Speech) and chronic aspirin/Plavix.  Although procalcitonin is normal, imaging and history warrant treatment with antibiotics for presumed nosocomial pneumonia.  Moved to ICU due to labile BP and increased oxygen requirements.  After receiving one unit of packed red blood cells, he looks much better and BP is improved.    · Treat for pneumonia (nosocomial pathogens)  · Nebulized bronchodilators given COPD/emphysema changes  · Titrate oxygen as  needed => no current indication for ventilatory support; if needed, he will require trach change to a cuffed trach tube.  · Receiving TXA nebulizer treatment for hemoptysis  · Hold Plavix/ASA for now      I updated the patient's wife at the bedside on rounds.    Care discussed with Hospital Medicine on ICU rounds.    Time spent providing bedside critical care for life-threatening illness (not including procedure time) = 35minutes.     Nikita Ling MD  Pulmonology  [facility] [place] - Intensive Care

## 2023-03-19 NOTE — SUBJECTIVE & OBJECTIVE
Interval History: Very active morning with recurrent bleeding from trach and hypotension.  Rapid response called for episode of bleeding from trach and AI deterioration alert issued due to significant hypotension (SBP in 70s).  Seen twice this morning.  Initially denied any light headedness, chest pain or shortness of breath.  On second visit noted severe back/shoulder pain, but denied sob and anterior chest pain.  Left back medial to scapula was TTP.  PRBC and saline bolus infusing.  Checking cta chest, ekg, troponin, dimer and lactic acid.  Transferring to ICU.     Objective:     Vital Signs (Most Recent):  Temp: 97.6 °F (36.4 °C) (03/19/23 1150)  Pulse: 100 (03/19/23 1150)  Resp: 18 (03/19/23 1150)  BP: (!) 85/58 (03/19/23 1150)  SpO2: (!) 89 % (03/19/23 1150)   Vital Signs (24h Range):  Temp:  [97.6 °F (36.4 °C)-99 °F (37.2 °C)] 97.6 °F (36.4 °C)  Pulse:  [] 100  Resp:  [16-20] 18  SpO2:  [87 %-100 %] 89 %  BP: ()/(40-68) 85/58     Weight: 68 kg (150 lb)  Body mass index is 22.81 kg/m².    Intake/Output Summary (Last 24 hours) at 3/19/2023 1212  Last data filed at 3/19/2023 1124  Gross per 24 hour   Intake 4409.08 ml   Output 570 ml   Net 3839.08 ml      Physical Exam  Vitals and nursing note reviewed.   Constitutional:       General: He is in acute distress.      Appearance: He is well-developed. He is ill-appearing. He is not diaphoretic.      Comments: disheveled   HENT:      Head: Normocephalic and atraumatic.      Right Ear: External ear normal.      Left Ear: External ear normal.   Eyes:      General:         Right eye: No discharge.         Left eye: No discharge.      Conjunctiva/sclera: Conjunctivae normal.   Neck:      Thyroid: No thyromegaly.      Comments: Trache in place with blood tinged sputum   Cardiovascular:      Rate and Rhythm: Regular rhythm. Tachycardia present.      Heart sounds: No murmur heard.  Pulmonary:      Effort: Pulmonary effort is normal. No respiratory distress.       Breath sounds: Normal breath sounds.   Abdominal:      General: Bowel sounds are normal. There is no distension.      Palpations: Abdomen is soft. There is no mass.      Tenderness: There is no abdominal tenderness.      Comments: Peg tube in place   Musculoskeletal:         General: No deformity.      Cervical back: Normal range of motion and neck supple.   Skin:     General: Skin is warm and dry.   Neurological:      Mental Status: He is alert and oriented to person, place, and time.      Comments: Symmetric movement of BUE and BLE against gravity. Attempts to speak and communicates by writing in his notebook.  Diffusely weak   Psychiatric:         Behavior: Behavior normal.       Significant Labs: All pertinent labs within the past 24 hours have been reviewed.    Significant Imaging: I have reviewed all pertinent imaging results/findings within the past 24 hours.

## 2023-03-19 NOTE — ASSESSMENT & PLAN NOTE
-Hb on admit 7.6 and this morning 6.5  -Baseline Hb 8-9.  -Presented w intermittent hemoptysis via trach x 4 days. Had another episode this morning (3/19/2023)  -Seen at ENT 3 days ago (Dr. Smalls) with exam without source. Trache exchanged at that time.   -Ferritin only 84 and Tsat 18% - consistent with iron deficiency  -Platelets and PT/INR are normal.  -Consult pulmonology critical care and ENT.  Call out to ENT on call for system  -Will give TXA nebulizer

## 2023-03-19 NOTE — ASSESSMENT & PLAN NOTE
-Diagnosed 12/15/21 via FNA.  Underwent total glossectomy, bilateral neck dissection, bilateral cervical facial flaps and anterolateral thigh flap reconstruction of glossectomy defect 1/4/22  -Completed adjuvant chemoradiation  -Followed by oncology and ENT outpt. No longer on chemo or radiation.   -Had trache changed by ENT 3 days ago and scope without signs of bleeding.   -Treatment as above.

## 2023-03-19 NOTE — CLINICAL REVIEW
IP Sepsis Screen (most recent)       Sepsis Screen (IP) - 03/19/23 4081       Is the patient's history or complaint suggestive of a possible infection? Yes  -JM    Are there at least two of the following signs and symptoms present? Yes  -JM    Sepsis signs/symptoms - Tachycardia Tachycardia     >90  -JM    Sepsis signs/symptoms - Tachypnea Tachypnea     >20  -JM    Are any of the following organ dysfunction criteria present and not considered to be due to a chronic condition? Yes  -    Organ Dysfunction Criteria - Resp Comp Respiratory Compromise: Requiring > 5L NC  -JM    Initiate Sepsis Protocol No  -JM    Reason sepsis not considered Pt. receiving appropriate management  -              User Key  (r) = Recorded By, (t) = Taken By, (c) = Cosigned By      Initials Name    LILIANA Ring RN

## 2023-03-19 NOTE — ASSESSMENT & PLAN NOTE
-Admitted to inpatient status  -On 3/19 developed hypoxia, hypotension, bleeding from tracheostomy / hemoptysis and severe back pain so moved to ICU  -Prior clinic notes have shown soft blood pressure - but not this low (SBP in 70s).  -Etiology unclear - concerning for hemorrhage given bleeding at trach site and anemia, but could also be sepsis due to UTI (given urine culture growing Enterococcus) or cardiogenic (given troponin bump and severe back pain)  -Lactic acid was normal.  D-dimer minimally elevated.  Troponin bumped to 2.22  AM cortisol only 8.6.  -CTA chest showed no PE, mucoid impaction, small airway disease, and consolidation at the lung bases bilaterally, increased from prior, increased moderate left pleural effusion, emphysema with evidence of pulmonary hypertension.  -Cardiology and pulmonology consulted and input appreciated  -On 3/19 he was transfused 2 units PRBC, bolused IV fluids, started on broad spectrum antibiotics and treated with TXA nebulizers.  Pulmonology felt ct scan likely represented pneumonia.  ENT on call for system felt OK to keep at [facility].  He did not require pressors.  His blood pressure has stabilized, bleeding appears to have stopped and Hb has improved.  -Urine culture is growing E.faecalis S >100,000cfu/ml - await sensitivities.  Blodo cultures negative thus far.  Continue vancomycin and cefepime for now.  -AM cortisol is low.  Will check cosyntropin stim test.  -Bump in troponin without chest pain felt secondary to demand ischemia.  Echo noted preserved EF and inferio-basilar hypokinesis.  Not a good candidate for LHC or blood thinning medications.  Cardiology recommended outpatient stress test.  -Continue in ICU for 24 hours more  -Await ENT consult - Dr. French is aware of consult.   [address] [place] [address]  Authorization for Surgical Operation or Procedure  Date: ______________       Time: _______________  1.  I hereby authorize Dr. Rob Cummings, my physician and the assistant, to perform the following operation and/ 5. I consent to the photographing of the operations or procedures to be performed for the purposes of advancing medicine, science, and/or education, provided my identity is not revealed.  If the procedure has been videotaped, the physician/surgeon will obta risks and benefits involved in the proposed treatment and any reasonable alternative to the proposed treatment. I have also explained the risks and benefits involved in the refusal of the proposed treatment and have answered the patient's questions.  If I h

## 2023-03-19 NOTE — ASSESSMENT & PLAN NOTE
-Continue medications via PEG. Does not take oral intake  -On isosource 1.5 6 days daily with 120cc free water flushes via wife  -Will continue home tube feedings, with nutrition consulted

## 2023-03-20 ENCOUNTER — TELEPHONE (OUTPATIENT)
Dept: OTOLARYNGOLOGY | Facility: CLINIC | Age: 73
End: 2023-03-20
Payer: MEDICARE

## 2023-03-20 LAB
ANION GAP SERPL CALC-SCNC: 4 MMOL/L (ref 8–16)
ASCENDING AORTA: 3.47 CM
AV INDEX (PROSTH): 0.8
AV MEAN GRADIENT: 3 MMHG
AV PEAK GRADIENT: 5 MMHG
AV VALVE AREA: 4.36 CM2
AV VELOCITY RATIO: 0.8
BACTERIA UR CULT: ABNORMAL
BASOPHILS # BLD AUTO: 0.02 K/UL (ref 0–0.2)
BASOPHILS # BLD AUTO: 0.04 K/UL (ref 0–0.2)
BASOPHILS # BLD AUTO: 0.04 K/UL (ref 0–0.2)
BASOPHILS NFR BLD: 0.2 % (ref 0–1.9)
BASOPHILS NFR BLD: 0.3 % (ref 0–1.9)
BASOPHILS NFR BLD: 0.4 % (ref 0–1.9)
BSA FOR ECHO PROCEDURE: 1.81 M2
BUN SERPL-MCNC: 25 MG/DL (ref 8–23)
CALCIUM SERPL-MCNC: 8.4 MG/DL (ref 8.7–10.5)
CHLORIDE SERPL-SCNC: 110 MMOL/L (ref 95–110)
CO2 SERPL-SCNC: 29 MMOL/L (ref 23–29)
CORTIS SERPL-MCNC: 11.4 UG/DL
CORTIS SERPL-MCNC: 19.6 UG/DL
CORTIS SERPL-MCNC: 26.8 UG/DL
CORTIS SERPL-MCNC: 8.6 UG/DL
CREAT SERPL-MCNC: 0.6 MG/DL (ref 0.5–1.4)
CV ECHO LV RWT: 0.5 CM
DIFFERENTIAL METHOD: ABNORMAL
DOP CALC AO PEAK VEL: 1.13 M/S
DOP CALC AO VTI: 23.4 CM
DOP CALC LVOT AREA: 5.4 CM2
DOP CALC LVOT DIAMETER: 2.63 CM
DOP CALC LVOT PEAK VEL: 0.9 M/S
DOP CALC LVOT STROKE VOLUME: 102.08 CM3
DOP CALCLVOT PEAK VEL VTI: 18.8 CM
E WAVE DECELERATION TIME: 135.46 MSEC
E/A RATIO: 0.67
E/E' RATIO: 10.75 M/S
ECHO LV POSTERIOR WALL: 1.33 CM (ref 0.6–1.1)
EJECTION FRACTION: 60 %
EOSINOPHIL # BLD AUTO: 0.1 K/UL (ref 0–0.5)
EOSINOPHIL # BLD AUTO: 0.2 K/UL (ref 0–0.5)
EOSINOPHIL # BLD AUTO: 0.2 K/UL (ref 0–0.5)
EOSINOPHIL NFR BLD: 1.1 % (ref 0–8)
EOSINOPHIL NFR BLD: 1.7 % (ref 0–8)
EOSINOPHIL NFR BLD: 2 % (ref 0–8)
ERYTHROCYTE [DISTWIDTH] IN BLOOD BY AUTOMATED COUNT: 16 % (ref 11.5–14.5)
ERYTHROCYTE [DISTWIDTH] IN BLOOD BY AUTOMATED COUNT: 16.1 % (ref 11.5–14.5)
ERYTHROCYTE [DISTWIDTH] IN BLOOD BY AUTOMATED COUNT: 16.3 % (ref 11.5–14.5)
EST. GFR  (NO RACE VARIABLE): >60 ML/MIN/1.73 M^2
FACT X PPP CHRO-ACNC: <0.1 IU/ML (ref 0.3–0.7)
FRACTIONAL SHORTENING: 26 % (ref 28–44)
GLUCOSE SERPL-MCNC: 146 MG/DL (ref 70–110)
HCT VFR BLD AUTO: 24.7 % (ref 40–54)
HCT VFR BLD AUTO: 27.6 % (ref 40–54)
HCT VFR BLD AUTO: 28.7 % (ref 40–54)
HGB BLD-MCNC: 8 G/DL (ref 14–18)
HGB BLD-MCNC: 8.7 G/DL (ref 14–18)
HGB BLD-MCNC: 9.2 G/DL (ref 14–18)
IMM GRANULOCYTES # BLD AUTO: 0.08 K/UL (ref 0–0.04)
IMM GRANULOCYTES # BLD AUTO: 0.08 K/UL (ref 0–0.04)
IMM GRANULOCYTES # BLD AUTO: 0.11 K/UL (ref 0–0.04)
IMM GRANULOCYTES NFR BLD AUTO: 0.7 % (ref 0–0.5)
IMM GRANULOCYTES NFR BLD AUTO: 0.8 % (ref 0–0.5)
IMM GRANULOCYTES NFR BLD AUTO: 0.9 % (ref 0–0.5)
INR PPP: 1 (ref 0.8–1.2)
INTERVENTRICULAR SEPTUM: 1.29 CM (ref 0.6–1.1)
IVC DIAMETER: 2.17 CM
IVRT: 165.56 MSEC
LA MAJOR: 5.8 CM
LA MINOR: 5.43 CM
LA WIDTH: 3.8 CM
LEFT ATRIUM SIZE: 3.98 CM
LEFT ATRIUM VOLUME INDEX: 39.8 ML/M2
LEFT ATRIUM VOLUME: 72.1 CM3
LEFT INTERNAL DIMENSION IN SYSTOLE: 3.92 CM (ref 2.1–4)
LEFT VENTRICLE DIASTOLIC VOLUME INDEX: 74.48 ML/M2
LEFT VENTRICLE DIASTOLIC VOLUME: 134.81 ML
LEFT VENTRICLE MASS INDEX: 160 G/M2
LEFT VENTRICLE SYSTOLIC VOLUME INDEX: 36.8 ML/M2
LEFT VENTRICLE SYSTOLIC VOLUME: 66.58 ML
LEFT VENTRICULAR INTERNAL DIMENSION IN DIASTOLE: 5.29 CM (ref 3.5–6)
LEFT VENTRICULAR MASS: 289.2 G
LV LATERAL E/E' RATIO: 8.6 M/S
LV SEPTAL E/E' RATIO: 14.33 M/S
LVOT MG: 1.73 MMHG
LVOT MV: 0.61 CM/S
LYMPHOCYTES # BLD AUTO: 0.3 K/UL (ref 1–4.8)
LYMPHOCYTES # BLD AUTO: 0.4 K/UL (ref 1–4.8)
LYMPHOCYTES # BLD AUTO: 0.6 K/UL (ref 1–4.8)
LYMPHOCYTES NFR BLD: 3.4 % (ref 18–48)
LYMPHOCYTES NFR BLD: 3.4 % (ref 18–48)
LYMPHOCYTES NFR BLD: 4.6 % (ref 18–48)
MAGNESIUM SERPL-MCNC: 2 MG/DL (ref 1.6–2.6)
MCH RBC QN AUTO: 29.5 PG (ref 27–31)
MCH RBC QN AUTO: 29.6 PG (ref 27–31)
MCH RBC QN AUTO: 30 PG (ref 27–31)
MCHC RBC AUTO-ENTMCNC: 31.5 G/DL (ref 32–36)
MCHC RBC AUTO-ENTMCNC: 32.1 G/DL (ref 32–36)
MCHC RBC AUTO-ENTMCNC: 32.4 G/DL (ref 32–36)
MCV RBC AUTO: 92 FL (ref 82–98)
MCV RBC AUTO: 93 FL (ref 82–98)
MCV RBC AUTO: 94 FL (ref 82–98)
MONOCYTES # BLD AUTO: 0.7 K/UL (ref 0.3–1)
MONOCYTES # BLD AUTO: 0.9 K/UL (ref 0.3–1)
MONOCYTES # BLD AUTO: 1.1 K/UL (ref 0.3–1)
MONOCYTES NFR BLD: 6.7 % (ref 4–15)
MONOCYTES NFR BLD: 7.3 % (ref 4–15)
MONOCYTES NFR BLD: 9.4 % (ref 4–15)
MV PEAK A VEL: 1.29 M/S
MV PEAK E VEL: 0.86 M/S
MV STENOSIS PRESSURE HALF TIME: 39.28 MS
MV VALVE AREA P 1/2 METHOD: 5.6 CM2
NEUTROPHILS # BLD AUTO: 10.1 K/UL (ref 1.8–7.7)
NEUTROPHILS # BLD AUTO: 8.7 K/UL (ref 1.8–7.7)
NEUTROPHILS # BLD AUTO: 9.4 K/UL (ref 1.8–7.7)
NEUTROPHILS NFR BLD: 84.1 % (ref 38–73)
NEUTROPHILS NFR BLD: 85.2 % (ref 38–73)
NEUTROPHILS NFR BLD: 87.8 % (ref 38–73)
NRBC BLD-RTO: 0 /100 WBC
PISA TR MAX VEL: 1.62 M/S
PLATELET # BLD AUTO: 170 K/UL (ref 150–450)
PLATELET # BLD AUTO: 174 K/UL (ref 150–450)
PLATELET # BLD AUTO: 176 K/UL (ref 150–450)
PMV BLD AUTO: 9.4 FL (ref 9.2–12.9)
PMV BLD AUTO: 9.5 FL (ref 9.2–12.9)
PMV BLD AUTO: 9.6 FL (ref 9.2–12.9)
POTASSIUM SERPL-SCNC: 4.2 MMOL/L (ref 3.5–5.1)
PROTHROMBIN TIME: 10.4 SEC (ref 9–12.5)
PV PEAK VELOCITY: 0.7 CM/S
RA MAJOR: 4.94 CM
RA PRESSURE: 3 MMHG
RBC # BLD AUTO: 2.67 M/UL (ref 4.6–6.2)
RBC # BLD AUTO: 2.94 M/UL (ref 4.6–6.2)
RBC # BLD AUTO: 3.12 M/UL (ref 4.6–6.2)
RIGHT VENTRICULAR END-DIASTOLIC DIMENSION: 3.28 CM
RV TISSUE DOPPLER FREE WALL SYSTOLIC VELOCITY 1 (APICAL 4 CHAMBER VIEW): 0.01 CM/S
SINUS: 4.18 CM
SODIUM SERPL-SCNC: 143 MMOL/L (ref 136–145)
STJ: 2.95 CM
TDI LATERAL: 0.1 M/S
TDI SEPTAL: 0.06 M/S
TDI: 0.08 M/S
TR MAX PG: 10 MMHG
TRICUSPID ANNULAR PLANE SYSTOLIC EXCURSION: 2.1 CM
TROPONIN I SERPL DL<=0.01 NG/ML-MCNC: 3.42 NG/ML (ref 0–0.03)
TV PEAK GRADIENT: 1.29 MMHG
TV REST PULMONARY ARTERY PRESSURE: 13 MMHG
WBC # BLD AUTO: 11.2 K/UL (ref 3.9–12.7)
WBC # BLD AUTO: 11.86 K/UL (ref 3.9–12.7)
WBC # BLD AUTO: 9.89 K/UL (ref 3.9–12.7)

## 2023-03-20 PROCEDURE — 99291 CRITICAL CARE FIRST HOUR: CPT | Mod: ,,, | Performed by: INTERNAL MEDICINE

## 2023-03-20 PROCEDURE — 93010 EKG 12-LEAD: ICD-10-PCS | Mod: 76,,, | Performed by: INTERNAL MEDICINE

## 2023-03-20 PROCEDURE — 80048 BASIC METABOLIC PNL TOTAL CA: CPT | Performed by: HOSPITALIST

## 2023-03-20 PROCEDURE — 25000242 PHARM REV CODE 250 ALT 637 W/ HCPCS: Performed by: HOSPITALIST

## 2023-03-20 PROCEDURE — 36569 INSJ PICC 5 YR+ W/O IMAGING: CPT

## 2023-03-20 PROCEDURE — 84484 ASSAY OF TROPONIN QUANT: CPT | Performed by: NURSE PRACTITIONER

## 2023-03-20 PROCEDURE — C1751 CATH, INF, PER/CENT/MIDLINE: HCPCS

## 2023-03-20 PROCEDURE — 27000221 HC OXYGEN, UP TO 24 HOURS

## 2023-03-20 PROCEDURE — 93005 ELECTROCARDIOGRAM TRACING: CPT

## 2023-03-20 PROCEDURE — 85025 COMPLETE CBC W/AUTO DIFF WBC: CPT | Mod: 91 | Performed by: NURSE PRACTITIONER

## 2023-03-20 PROCEDURE — 85520 HEPARIN ASSAY: CPT | Performed by: HOSPITALIST

## 2023-03-20 PROCEDURE — 99233 SBSQ HOSP IP/OBS HIGH 50: CPT | Mod: ,,, | Performed by: NURSE PRACTITIONER

## 2023-03-20 PROCEDURE — 31615 TRCHEOBRNCHSC EST TRACHS INC: CPT | Mod: ,,, | Performed by: OTOLARYNGOLOGY

## 2023-03-20 PROCEDURE — 36415 COLL VENOUS BLD VENIPUNCTURE: CPT | Performed by: HOSPITALIST

## 2023-03-20 PROCEDURE — 99233 PR SUBSEQUENT HOSPITAL CARE,LEVL III: ICD-10-PCS | Mod: ,,, | Performed by: NURSE PRACTITIONER

## 2023-03-20 PROCEDURE — 31615 PR SCOPE THRU TRACHEOSTOMY: ICD-10-PCS | Mod: ,,, | Performed by: OTOLARYNGOLOGY

## 2023-03-20 PROCEDURE — 63600175 PHARM REV CODE 636 W HCPCS: Performed by: NURSE PRACTITIONER

## 2023-03-20 PROCEDURE — 20000000 HC ICU ROOM

## 2023-03-20 PROCEDURE — 25000003 PHARM REV CODE 250: Performed by: PHYSICIAN ASSISTANT

## 2023-03-20 PROCEDURE — 25000003 PHARM REV CODE 250: Performed by: STUDENT IN AN ORGANIZED HEALTH CARE EDUCATION/TRAINING PROGRAM

## 2023-03-20 PROCEDURE — 99223 1ST HOSP IP/OBS HIGH 75: CPT | Mod: 25,,, | Performed by: OTOLARYNGOLOGY

## 2023-03-20 PROCEDURE — 82533 TOTAL CORTISOL: CPT | Mod: 91 | Performed by: HOSPITALIST

## 2023-03-20 PROCEDURE — 85610 PROTHROMBIN TIME: CPT | Performed by: HOSPITALIST

## 2023-03-20 PROCEDURE — 94761 N-INVAS EAR/PLS OXIMETRY MLT: CPT

## 2023-03-20 PROCEDURE — 99223 PR INITIAL HOSPITAL CARE,LEVL III: ICD-10-PCS | Mod: 25,,, | Performed by: OTOLARYNGOLOGY

## 2023-03-20 PROCEDURE — 93010 ELECTROCARDIOGRAM REPORT: CPT | Mod: 76,,, | Performed by: INTERNAL MEDICINE

## 2023-03-20 PROCEDURE — 85025 COMPLETE CBC W/AUTO DIFF WBC: CPT | Mod: 91 | Performed by: HOSPITALIST

## 2023-03-20 PROCEDURE — 94640 AIRWAY INHALATION TREATMENT: CPT

## 2023-03-20 PROCEDURE — 99900026 HC AIRWAY MAINTENANCE (STAT)

## 2023-03-20 PROCEDURE — 94760 N-INVAS EAR/PLS OXIMETRY 1: CPT

## 2023-03-20 PROCEDURE — 99291 PR CRITICAL CARE, E/M 30-74 MINUTES: ICD-10-PCS | Mod: ,,, | Performed by: INTERNAL MEDICINE

## 2023-03-20 PROCEDURE — 63600175 PHARM REV CODE 636 W HCPCS: Performed by: HOSPITALIST

## 2023-03-20 PROCEDURE — 83735 ASSAY OF MAGNESIUM: CPT | Performed by: HOSPITALIST

## 2023-03-20 PROCEDURE — 82024 ASSAY OF ACTH: CPT | Performed by: HOSPITALIST

## 2023-03-20 PROCEDURE — 99900035 HC TECH TIME PER 15 MIN (STAT)

## 2023-03-20 PROCEDURE — 93010 ELECTROCARDIOGRAM REPORT: CPT | Mod: ,,, | Performed by: INTERNAL MEDICINE

## 2023-03-20 PROCEDURE — 25000003 PHARM REV CODE 250: Performed by: HOSPITALIST

## 2023-03-20 RX ORDER — LANOLIN ALCOHOL/MO/W.PET/CERES
1 CREAM (GRAM) TOPICAL DAILY
Status: DISCONTINUED | OUTPATIENT
Start: 2023-03-20 | End: 2023-04-30 | Stop reason: HOSPADM

## 2023-03-20 RX ORDER — HYDROXYZINE HYDROCHLORIDE 25 MG/1
25 TABLET, FILM COATED ORAL NIGHTLY PRN
Status: DISCONTINUED | OUTPATIENT
Start: 2023-03-20 | End: 2023-03-23

## 2023-03-20 RX ORDER — HEPARIN SODIUM,PORCINE/D5W 25000/250
0-40 INTRAVENOUS SOLUTION INTRAVENOUS CONTINUOUS
Status: DISCONTINUED | OUTPATIENT
Start: 2023-03-20 | End: 2023-03-23

## 2023-03-20 RX ORDER — OXYCODONE HYDROCHLORIDE 5 MG/1
5 TABLET ORAL EVERY 6 HOURS PRN
Status: DISCONTINUED | OUTPATIENT
Start: 2023-03-20 | End: 2023-04-30 | Stop reason: HOSPADM

## 2023-03-20 RX ORDER — NAPROXEN SODIUM 220 MG/1
81 TABLET, FILM COATED ORAL ONCE
Status: COMPLETED | OUTPATIENT
Start: 2023-03-20 | End: 2023-03-20

## 2023-03-20 RX ORDER — NITROGLYCERIN 20 MG/1
1 PATCH TRANSDERMAL DAILY
Status: DISCONTINUED | OUTPATIENT
Start: 2023-03-21 | End: 2023-03-20

## 2023-03-20 RX ORDER — MORPHINE SULFATE 4 MG/ML
4 INJECTION, SOLUTION INTRAMUSCULAR; INTRAVENOUS ONCE
Status: COMPLETED | OUTPATIENT
Start: 2023-03-20 | End: 2023-03-20

## 2023-03-20 RX ORDER — TALC
9 POWDER (GRAM) TOPICAL NIGHTLY
Status: DISCONTINUED | OUTPATIENT
Start: 2023-03-20 | End: 2023-04-30 | Stop reason: HOSPADM

## 2023-03-20 RX ORDER — FUROSEMIDE 10 MG/ML
20 INJECTION INTRAMUSCULAR; INTRAVENOUS ONCE
Status: COMPLETED | OUTPATIENT
Start: 2023-03-20 | End: 2023-03-20

## 2023-03-20 RX ORDER — COSYNTROPIN 0.25 MG/ML
0.25 INJECTION, POWDER, FOR SOLUTION INTRAMUSCULAR; INTRAVENOUS ONCE
Status: COMPLETED | OUTPATIENT
Start: 2023-03-20 | End: 2023-03-20

## 2023-03-20 RX ORDER — SODIUM CHLORIDE 0.9 % (FLUSH) 0.9 %
10 SYRINGE (ML) INJECTION
Status: DISCONTINUED | OUTPATIENT
Start: 2023-03-20 | End: 2023-04-30 | Stop reason: HOSPADM

## 2023-03-20 RX ORDER — SODIUM CHLORIDE 0.9 % (FLUSH) 0.9 %
10 SYRINGE (ML) INJECTION EVERY 6 HOURS
Status: DISCONTINUED | OUTPATIENT
Start: 2023-03-21 | End: 2023-04-30 | Stop reason: HOSPADM

## 2023-03-20 RX ORDER — NITROGLYCERIN 20 MG/100ML
0-400 INJECTION INTRAVENOUS CONTINUOUS
Status: DISCONTINUED | OUTPATIENT
Start: 2023-03-20 | End: 2023-03-25

## 2023-03-20 RX ADMIN — GLYCOPYRROLATE 1 MG: 1 TABLET ORAL at 08:03

## 2023-03-20 RX ADMIN — VANCOMYCIN HYDROCHLORIDE 1000 MG: 1 INJECTION, POWDER, LYOPHILIZED, FOR SOLUTION INTRAVENOUS at 02:03

## 2023-03-20 RX ADMIN — ACETAMINOPHEN 499.51 MG: 160 SOLUTION ORAL at 06:03

## 2023-03-20 RX ADMIN — VANCOMYCIN HYDROCHLORIDE 1000 MG: 1 INJECTION, POWDER, LYOPHILIZED, FOR SOLUTION INTRAVENOUS at 04:03

## 2023-03-20 RX ADMIN — NITROGLYCERIN 10 MCG/MIN: 20 INJECTION INTRAVENOUS at 06:03

## 2023-03-20 RX ADMIN — FERROUS SULFATE TAB 325 MG (65 MG ELEMENTAL FE) 1 EACH: 325 (65 FE) TAB at 12:03

## 2023-03-20 RX ADMIN — LIDOCAINE 5% 2 PATCH: 700 PATCH TOPICAL at 12:03

## 2023-03-20 RX ADMIN — OXYCODONE HYDROCHLORIDE 5 MG: 5 TABLET ORAL at 03:03

## 2023-03-20 RX ADMIN — HEPARIN SODIUM 12 UNITS/KG/HR: 10000 INJECTION, SOLUTION INTRAVENOUS at 09:03

## 2023-03-20 RX ADMIN — MORPHINE SULFATE 4 MG: 4 INJECTION INTRAVENOUS at 04:03

## 2023-03-20 RX ADMIN — CEFEPIME HYDROCHLORIDE 2 G: 2 INJECTION, SOLUTION INTRAVENOUS at 06:03

## 2023-03-20 RX ADMIN — FUROSEMIDE 20 MG: 10 INJECTION, SOLUTION INTRAMUSCULAR; INTRAVENOUS at 05:03

## 2023-03-20 RX ADMIN — ACETAMINOPHEN 499.51 MG: 160 SOLUTION ORAL at 10:03

## 2023-03-20 RX ADMIN — IPRATROPIUM BROMIDE AND ALBUTEROL SULFATE 3 ML: 2.5; .5 SOLUTION RESPIRATORY (INHALATION) at 04:03

## 2023-03-20 RX ADMIN — CEFEPIME HYDROCHLORIDE 2 G: 2 INJECTION, SOLUTION INTRAVENOUS at 12:03

## 2023-03-20 RX ADMIN — COSYNTROPIN 0.25 MG: 0.25 INJECTION, POWDER, LYOPHILIZED, FOR SOLUTION INTRAVENOUS at 12:03

## 2023-03-20 RX ADMIN — ASPIRIN 81 MG CHEWABLE TABLET 81 MG: 81 TABLET CHEWABLE at 05:03

## 2023-03-20 RX ADMIN — MELATONIN TAB 3 MG 9 MG: 3 TAB at 08:03

## 2023-03-20 RX ADMIN — CEFEPIME HYDROCHLORIDE 2 G: 2 INJECTION, SOLUTION INTRAVENOUS at 08:03

## 2023-03-20 RX ADMIN — GLYCOPYRROLATE 1 MG: 1 TABLET ORAL at 02:03

## 2023-03-20 RX ADMIN — ATORVASTATIN CALCIUM 20 MG: 10 TABLET, FILM COATED ORAL at 08:03

## 2023-03-20 RX ADMIN — GUAIFENESIN 400 MG: 200 SOLUTION ORAL at 10:03

## 2023-03-20 RX ADMIN — Medication 0.02 MCG/KG/MIN: at 08:03

## 2023-03-20 RX ADMIN — TRANEXAMIC ACID 500 MG: 100 INJECTION, SOLUTION INTRAVENOUS at 08:03

## 2023-03-20 NOTE — NURSING
[facility]  Nurses Note -- 4 Eyes      3/19/2023       Skin assessed on: Transfer      [x] No Pressure Injuries Present    [x]Prevention Measures Documented    [] Yes LDA  for Pressure Injury Previously documented     [] Yes New Pressure Injury Discovered   [] LDA for New Pressure Injury Added      Attending RN:  Higinio Zuleta RN     Second RN:  Peg Cleary RN

## 2023-03-20 NOTE — ASSESSMENT & PLAN NOTE
Remote Hx PCI to RCA 2000. Troponin up to 2.2. Denies CP. EKG with mild NSSTT changes. Likely demand ischemia from hypotension and anemia. Poor candidate for anticoagulation or LHC with high bleeding risk and hemoptysis. Check echo - if stable ok for out patient ischemic evaluation when able to tolerate anticoagulation

## 2023-03-20 NOTE — SUBJECTIVE & OBJECTIVE
Past Medical History:   Diagnosis Date    Cancer     skin to Rt forearm and face    COPD (chronic obstructive pulmonary disease)     Hyperlipidemia     Hypertension     Pseudoaneurysm        Past Surgical History:   Procedure Laterality Date    ABDOMINAL SURGERY      stents placed in liver and large intestines, per patient    CAROTID STENT      COLONOSCOPY N/A 09/27/2022    Procedure: COLONOSCOPY;  Surgeon: Donnie Peterson MD;  Location: [facility] ENDO (2ND FLR);  Service: Endoscopy;  Laterality: N/A;    COLONOSCOPY N/A 09/30/2022    Procedure: COLONOSCOPY;  Surgeon: Joy Cabrera MD;  Location: [facility] ENDO (2ND FLR);  Service: Endoscopy;  Laterality: N/A;    CORONARY STENT PLACEMENT  01/2000    DISSECTION OF NECK Bilateral 01/04/2022    Procedure: DISSECTION, NECK;  Surgeon: Naeem Smalls MD;  [facility];  Service: ENT;  Laterality: Bilateral;    ESOPHAGOGASTRODUODENOSCOPY N/A 09/27/2022    Procedure: EGD (ESOPHAGOGASTRODUODENOSCOPY);  Surgeon: Donnie Peterson MD;  Location: [facility] ENDO (2ND FLR);  Service: Endoscopy;  Laterality: N/A;    ESOPHAGOGASTRODUODENOSCOPY N/A 09/30/2022    Procedure: EGD (ESOPHAGOGASTRODUODENOSCOPY);  Surgeon: Joy Cabrera MD;  Location: [facility] ENDO (2ND FLR);  Service: Endoscopy;  Laterality: N/A;    ESOPHAGOGASTRODUODENOSCOPY W/ PEG N/A 01/04/2022    Procedure: EGD, WITH PEG TUBE INSERTION;  Surgeon: Anthony Calabrese MD;  [facility];  Service: General;  Laterality: N/A;    EYE SURGERY      Cataract bilateral    femoral stents      bilateral    FLAP PROCEDURE N/A 01/03/2022    Procedure: CREATION, FREE FLAP;  Surgeon: Naeem Smalls MD;  [facility];  Service: ENT;  Laterality: N/A;    FLAP PROCEDURE Right 01/04/2022    Procedure: CREATION, FREE FLAP;  Surgeon: Stacey Conde MD;  [facility];  Service: ENT;  Laterality: Right;  Ischemic start 1203  Ischemic stop 1353    GLOSSECTOMY N/A 01/04/2022    Procedure: GLOSSECTOMY;   Surgeon: Naeem Smalls MD;  Location: [facility] OR [address] [place] [address];  Service: ENT;  Laterality: N/A;    RADICAL NECK DISSECTION Left 2022    Procedure: DISSECTION, NECK, RADICAL;  Surgeon: Naeem Smalls MD;  Location: [facility] OR [address] [place] [address];  Service: ENT;  Laterality: Left;    SKIN BIOPSY      SKIN CANCER EXCISION      TRACHEOTOMY N/A 2022    Procedure: TRACHEOTOMY;  Surgeon: Naeem Smalls MD;  Location: [facility] OR [address] [place] [address];  Service: ENT;  Laterality: N/A;    VASCULAR SURGERY         Review of patient's allergies indicates:  No Known Allergies    No current facility-administered medications on file prior to encounter.     Current Outpatient Medications on File Prior to Encounter   Medication Sig    amLODIPine (NORVASC) 10 MG tablet Take 10 mg by mouth.    atorvastatin (LIPITOR) 20 MG tablet 1 tablet (20 mg total) by Per G Tube route once daily.    clopidogreL (PLAVIX) 75 mg tablet Take 75 mg by mouth once daily.    glycopyrrolate (CUVPOSA) 1 mg/5 mL (0.2 mg/mL) Soln Take 5 mLs (1 mg total) by mouth 3 (three) times daily as needed (TO REDUCE SECRETIONS).    guaiFENesin 200 mg/5 mL Liqd Take 400 mg by mouth every 4 (four) hours as needed (for secretions).    hydrOXYzine HCL (ATARAX) 25 MG tablet SMARTSI.5 Tablet(s) Gastro Tube Every 8 Hours PRN    melatonin (MELATIN) 3 mg tablet 2 tablets (6 mg total) by Per G Tube route nightly.    metoprolol succinate (TOPROL-XL) 200 MG 24 hr tablet Take 200 mg by mouth 2 (two) times daily.    multivit-min/FA/lycopen/lutein (CENTRUM SILVER MEN ORAL) Take by mouth.    omeprazole (PRILOSEC) 40 MG capsule Take 40 mg (contents of one capsule) twice daily through PEG tube. Mix contents of capsule with 10 mL applesauce.    sodium chloride 3% 3 % nebulizer solution Take 4 mLs by nebulization 2 (two) times a day.    WIXELA INHUB 250-50 mcg/dose diskus inhaler SMARTSI Puff(s) By Mouth Every 12 Hours    bisacodyL (DULCOLAX) 10 mg Supp Place 1 suppository (10 mg total)  rectally daily as needed.    oxyCODONE (ROXICODONE) 5 mg/5 mL Soln 5 mLs (5 mg total) by Per G Tube route every 4 (four) hours as needed (Pain). (Patient not taking: Reported on 2023)    polyethylene glycol (GLYCOLAX) 17 gram PwPk 17 g by Per G Tube route 2 (two) times daily.    sodium chloride (OCEAN) 0.65 % nasal spray 1 spray by Nasal route as needed for Congestion. (Patient not taking: Reported on 2023)    [DISCONTINUED] losartan (COZAAR) 50 MG tablet 1 tablet (50 mg total) by Per G Tube route once daily.    [DISCONTINUED] metoprolol tartrate (LOPRESSOR) 100 MG tablet 1 tablet (100 mg total) by Per G Tube route 2 (two) times daily.     Family History    None       Tobacco Use    Smoking status: Former     Packs/day: 2.00     Years: 40.00     Pack years: 80.00     Types: Cigarettes     Start date: 1963     Quit date: 2018     Years since quittin.9    Smokeless tobacco: Never    Tobacco comments:     3/3 ppd x 40 yrs. Currently 3-4 cigarettes daily .He is trying  to quit. Is using a Vapor cigarettes  2-3 x's a day.   Substance and Sexual Activity    Alcohol use: Not Currently    Drug use: No    Sexual activity: Not Currently     Review of Systems   Unable to perform ROS: Other   Objective:     Vital Signs (Most Recent):  Temp: 98.4 °F (36.9 °C) (23 0305)  Pulse: 86 (23 0801)  Resp: (!) 26 (23 0305)  BP: 116/73 (23 0635)  SpO2: 99 % (23 0801)   Vital Signs (24h Range):  Temp:  [97.6 °F (36.4 °C)-98.5 °F (36.9 °C)] 98.4 °F (36.9 °C)  Pulse:  [] 86  Resp:  [17-] 26  SpO2:  [89 %-100 %] 99 %  BP: ()/(42-83) 116/73     Weight: 68 kg (150 lb)  Body mass index is 22.81 kg/m².    SpO2: 99 %         Intake/Output Summary (Last 24 hours) at 3/20/2023 0823  Last data filed at 3/20/2023 0600  Gross per 24 hour   Intake 2738.89 ml   Output 1575 ml   Net 1163.89 ml       Lines/Drains/Airways       Drain  Duration                  Gastrostomy/Enterostomy  01/04/22 Percutaneous endoscopic gastrostomy (PEG)  days              Airway  Duration             Adult Surgical Airway 03/16/23 1014 Shiley Uncuffed 6.0 3 days              Peripheral Intravenous Line  Duration                  Midline Catheter Insertion/Assessment  - Single Lumen 03/19/23 1335 Right basilic vein (medial side of arm) other (see comments) <1 day                    Physical Exam  Constitutional:       Appearance: He is well-developed.   HENT:      Head: Normocephalic and atraumatic.   Eyes:      Conjunctiva/sclera: Conjunctivae normal.      Pupils: Pupils are equal, round, and reactive to light.   Cardiovascular:      Rate and Rhythm: Normal rate.      Pulses: Intact distal pulses.      Heart sounds: Normal heart sounds.   Pulmonary:      Effort: Pulmonary effort is normal.      Breath sounds: Normal breath sounds.   Abdominal:      General: Bowel sounds are normal.      Palpations: Abdomen is soft.   Musculoskeletal:         General: Normal range of motion.      Cervical back: Normal range of motion and neck supple.   Skin:     General: Skin is warm and dry.   Neurological:      Mental Status: He is alert and oriented to person, place, and time.       Significant Labs: All pertinent lab results from the last 24 hours have been reviewed.    Significant Imaging: Echocardiogram: 2D echo with color flow doppler: No results found for this or any previous visit.

## 2023-03-20 NOTE — ASSESSMENT & PLAN NOTE
-Hb on admit 7.6 and this morning 8.0  -Baseline Hb 8-9.  -Presented w intermittent hemoptysis via trach x 2 days. Had another episode on morning of 3/19/2023  -Seen at ENT 2 days prior to admit (Dr. Smalls) with exam without source. Trache exchanged at that time.   -Ferritin only 84 and Tsat 18% - consistent with iron deficiency  -Platelets and PT/INR are normal.  -Treated with TXA nebulizer and bleeding has stopped  -Started FeSO4 which he will need at discharge  -Repeat cbc in AM

## 2023-03-20 NOTE — TELEPHONE ENCOUNTER
----- Message from Doris Garcia MA sent at 3/20/2023 10:04 AM CDT -----  Name of Who is Calling: Abigail      Room/Bed# : 275    Dept:ICU    What is the patient being evaluated for or Diagnosis: : ENT Bleeding from the Trach    Referring Physician: Marker    Call Back Number: [phone]      Please contact to further discuss and advise.

## 2023-03-20 NOTE — ASSESSMENT & PLAN NOTE
Likely secondary to lower respiratory tract infection in the setting of ASA/Plavix.  It seems less likely to be physical complication of the tracheostomy tube itself.    · Antibiotics for pneumonia.  · Check sputum for culture, including AFB for possible MAC infection.  · Respiratory viral panel.  · Hold anti-platelet medications for now => bleeding risk presently >> vascular risk  · No need for additional nebulized TXA   · Appreciate ENT evaluation

## 2023-03-20 NOTE — PROGRESS NOTES
"[facility] Intensive Care  Pulmonology  Progress Note    Patient Name: Fareed Richard Jr.  MRN: [id number]  Admission Date: 3/18/2023  Hospital Length of Stay: 1 days  Code Status: Full Code  Attending Provider: Kodi Escoto MD  Primary Care Provider: Kodi Tubbs MD   Principal Problem: Hypotension    Subjective:     Interval History: No further hemoptysis noted. Oxygen requirements stable. Mouthed, "I'm ready to get out of here."    Objective:     Vital Signs (Most Recent):  Temp: 98.7 °F (37.1 °C) (03/20/23 1600)  Pulse: 96 (03/20/23 1600)  Resp: (!) 22 (03/20/23 1600)  BP: 100/62 (03/20/23 1600)  SpO2: (!) 94 % (03/20/23 1600)   Vital Signs (24h Range):  Temp:  [97.6 °F (36.4 °C)-98.7 °F (37.1 °C)] 98.7 °F (37.1 °C)  Pulse:  [] 96  Resp:  [16-30] 22  SpO2:  [88 %-100 %] 94 %  BP: ()/(46-83) 100/62     Weight: 68 kg (150 lb)  Body mass index is 22.81 kg/m².      Intake/Output Summary (Last 24 hours) at 3/20/2023 1606  Last data filed at 3/20/2023 1604  Gross per 24 hour   Intake 2199.21 ml   Output 1200 ml   Net 999.21 ml       Physical Exam  Vitals and nursing note reviewed.   Constitutional:       General: He is not in acute distress.     Appearance: He is ill-appearing. He is not toxic-appearing.   Neck:      Comments: Tracheostomy in place.  Cardiovascular:      Rate and Rhythm: Normal rate and regular rhythm.      Heart sounds: Normal heart sounds. No murmur heard.    No gallop.   Pulmonary:      Effort: Pulmonary effort is normal.      Breath sounds: No wheezing, rhonchi or rales.   Abdominal:      General: There is no distension.      Palpations: Abdomen is soft.      Tenderness: There is no abdominal tenderness.   Musculoskeletal:      Right lower leg: No edema.      Left lower leg: No edema.   Neurological:      Mental Status: He is alert and oriented to person, place, and time. Mental status is at baseline.   Psychiatric:         Mood and Affect: Mood normal.         Thought Content: " Thought content normal.     Review of Systems    Vents:  Oxygen Concentration (%): 40 (03/20/23 1600)    Lines/Drains/Airways       Drain  Duration                  Gastrostomy/Enterostomy 01/04/22 Percutaneous endoscopic gastrostomy (PEG)  days              Airway  Duration             Adult Surgical Airway 03/16/23 1014 Shiley Uncuffed 6.0 4 days              Peripheral Intravenous Line  Duration                  Midline Catheter Insertion/Assessment  - Single Lumen 03/19/23 1335 Right basilic vein (medial side of arm) other (see comments) 1 day                    Significant Labs:    CBC/Anemia Profile:  Recent Labs   Lab 03/19/23  0413 03/19/23  1445 03/20/23  0205   WBC 9.90 8.74 11.20   HGB 6.5* 7.2* 8.0*   HCT 21.9* 22.7* 24.7*    203 176   MCV 94 93 93   RDW 14.7* 15.4* 16.1*        Chemistries:  Recent Labs   Lab 03/19/23  0413 03/20/23  0205    143   K 4.8 4.2    110   CO2 29 29   BUN 44* 25*   CREATININE 0.7 0.6   CALCIUM 8.2* 8.4*   ALBUMIN 2.4*  --    PROT 5.5*  --    BILITOT 0.4  --    ALKPHOS 65  --    ALT 15  --    AST 22  --    MG  --  2.0       All pertinent labs within the past 24 hours have been reviewed.    Significant Imaging:  I have reviewed all pertinent imaging results/findings within the past 24 hours.      ABG  No results for input(s): PH, PO2, PCO2, HCO3, BE in the last 168 hours.  Assessment/Plan:     Pulmonary  Hemoptysis  Likely secondary to lower respiratory tract infection in the setting of ASA/Plavix.  It seems less likely to be physical complication of the tracheostomy tube itself.    · Antibiotics for pneumonia.  · Check sputum for culture, including AFB for possible MAC infection.  · Respiratory viral panel.  · Hold anti-platelet medications for now => bleeding risk presently >> vascular risk  · No need for additional nebulized TXA   · Appreciate ENT evaluation     Chronic respiratory failure with hypoxia, on home O2 therapy  Patient with Hypoxic  Respiratory failure which is Acute on chronic.  he is on home oxygen at 2-3 LPM. Supplemental oxygen was provided and noted- Oxygen Concentration (%):  [28-40] 40.   Signs/symptoms of respiratory failure include- increased work of breathing and hemoptysis. Contributing diagnoses includes - Aspiration and Pneumonia Labs and images were reviewed. Patient Has not had a recent ABG. Will treat underlying causes and adjust management of respiratory failure as follows-     Hemoptysis with increased bilateral infiltrates on chest CTA in the setting of likely aspiration (attempting swallow training with Speech) and chronic aspirin/Plavix.  Although procalcitonin is normal, imaging and history warrant treatment with antibiotics for presumed nosocomial pneumonia.  Moved to ICU due to labile BP and increased oxygen requirements.  After receiving one unit of packed red blood cells, he looks much better and BP is improved.    · Treat for pneumonia (nosocomial pathogens)  · Nebulized bronchodilators given COPD/emphysema changes  · Titrate oxygen as needed => no current indication for ventilatory support; if needed, he will require trach change to a cuffed trach tube.  · No further bleeding noted, no need to continue TXA nebulizer treatments for hemoptysis  · Hold Plavix/ASA for now    Renal/  UTI (urinary tract infection)  Enterococcus in the urine.    - continue vanc     Plan discussed with Dr. Mcmanus.    Pulmonary will sign off at this time. Please call with any future questions.     I spent >35 minutes reviewing patient records, examining, and counseling the patient with greater than 50% of the time spent with direct patient care and coordination.     Alicia Schofield NP  Pulmonology  [facility] - Intensive Care

## 2023-03-20 NOTE — CONSULTS
[facility] - Intensive Care  Otorhinolaryngology-Head & Neck Surgery  Consult Note    Patient Name: Fareed Richard Jr.  MRN: [id number]  Code Status: Full Code  Admission Date: 3/18/2023  Hospital Length of Stay: 1 days  Attending Physician: Kodi Escoto MD  Primary Care Provider: Kodi Tubbs MD    Consults  No evidence of tracheoinnominate fistula on imaging or exam  Blood has stopped for now, call if bleeding recurs, recommend monitor off of TXA neb to ensure no recurrence of bleeding.   No source identified on exam today ; consider eval of other sources of anemia as well   Please call if bleeding recurs for repeat evaluation   CT neck pending in April ( ordered by rad onc in January planned for April). Recommend getting as outpatient at main campus for better quality imaging if possible however consider performing here if bleeding starts again     Subjective:     Chief Complaint/Reason for consult: bleeding from trach     History of Present Illness: 73 year old male with history of T4aN3b oral cavity scca diagnosed 1-4-22 treated with total glossectomy, bilateral neck dissection, tracheostomy and free flap reconstruction 1-3-22 with postop CRT which concluded 4-6-22. He was last seen by Dr. Smalls 3-16-23 and had a trach change and scope done trans nasal and through trachea as patient had noted some blood tinged secretions from trach.no pathology noted. Patient presented to the ER on 3-18-23 with episode of bright red blood from trach in addition to the pink sputum he had been having . In addition he was having some weakness and SOB. Lab workup revealed anemia which required blood transfusion.     Over the past 12 hours ( I.e. overnight the night of 3-19-23) he did well without any further bleeding. He was given nebs of TXA and a CTA of the chest was completed. He had a significant episode of bleeding on 3-19-23 during the day and was transferred to the ICU.     Medications:  Continuous Infusions:    NORepinephrine bitartrate-D5W       Scheduled Meds:   atorvastatin  20 mg Per G Tube Daily    ceFEPime (MAXIPIME) IVPB  2 g Intravenous Q8H    glycopyrrolate  1 mg Per G Tube TID    LIDOcaine  2 patch Transdermal Q24H    melatonin  6 mg Per G Tube Nightly    tranexamic acid  500 mg Nebulization BID    vancomycin (VANCOCIN) IVPB  1,000 mg Intravenous Q12H     PRN Meds:sodium chloride, acetaminophen, albuterol-ipratropium, guaiFENesin 100 mg/5 ml, naloxone, sodium chloride 0.9%, Pharmacy to dose Vancomycin consult **AND** vancomycin - pharmacy to dose     No current facility-administered medications on file prior to encounter.     Current Outpatient Medications on File Prior to Encounter   Medication Sig    amLODIPine (NORVASC) 10 MG tablet Take 10 mg by mouth.    atorvastatin (LIPITOR) 20 MG tablet 1 tablet (20 mg total) by Per G Tube route once daily.    clopidogreL (PLAVIX) 75 mg tablet Take 75 mg by mouth once daily.    glycopyrrolate (CUVPOSA) 1 mg/5 mL (0.2 mg/mL) Soln Take 5 mLs (1 mg total) by mouth 3 (three) times daily as needed (TO REDUCE SECRETIONS).    guaiFENesin 200 mg/5 mL Liqd Take 400 mg by mouth every 4 (four) hours as needed (for secretions).    hydrOXYzine HCL (ATARAX) 25 MG tablet SMARTSI.5 Tablet(s) Gastro Tube Every 8 Hours PRN    melatonin (MELATIN) 3 mg tablet 2 tablets (6 mg total) by Per G Tube route nightly.    metoprolol succinate (TOPROL-XL) 200 MG 24 hr tablet Take 200 mg by mouth 2 (two) times daily.    multivit-min/FA/lycopen/lutein (CENTRUM SILVER MEN ORAL) Take by mouth.    omeprazole (PRILOSEC) 40 MG capsule Take 40 mg (contents of one capsule) twice daily through PEG tube. Mix contents of capsule with 10 mL applesauce.    sodium chloride 3% 3 % nebulizer solution Take 4 mLs by nebulization 2 (two) times a day.    WIXELA INHUB 250-50 mcg/dose diskus inhaler SMARTSI Puff(s) By Mouth Every 12 Hours    bisacodyL (DULCOLAX) 10 mg Supp Place 1 suppository (10 mg total) rectally  daily as needed.    oxyCODONE (ROXICODONE) 5 mg/5 mL Soln 5 mLs (5 mg total) by Per G Tube route every 4 (four) hours as needed (Pain). (Patient not taking: Reported on 1/12/2023)    polyethylene glycol (GLYCOLAX) 17 gram PwPk 17 g by Per G Tube route 2 (two) times daily.    sodium chloride (OCEAN) 0.65 % nasal spray 1 spray by Nasal route as needed for Congestion. (Patient not taking: Reported on 1/12/2023)    [DISCONTINUED] losartan (COZAAR) 50 MG tablet 1 tablet (50 mg total) by Per G Tube route once daily.    [DISCONTINUED] metoprolol tartrate (LOPRESSOR) 100 MG tablet 1 tablet (100 mg total) by Per G Tube route 2 (two) times daily.       Review of patient's allergies indicates:  No Known Allergies    Past Medical History:   Diagnosis Date    Cancer     skin to Rt forearm and face    COPD (chronic obstructive pulmonary disease)     Hyperlipidemia     Hypertension     Pseudoaneurysm      Past Surgical History:   Procedure Laterality Date    ABDOMINAL SURGERY      stents placed in liver and large intestines, per patient    CAROTID STENT      COLONOSCOPY N/A 09/27/2022    Procedure: COLONOSCOPY;  Surgeon: Donnie Peterson MD;  Location: [facility] (2ND FLR);  Service: Endoscopy;  Laterality: N/A;    COLONOSCOPY N/A 09/30/2022    Procedure: COLONOSCOPY;  Surgeon: Joy Cabrera MD;  Location: [facility] (2ND FLR);  Service: Endoscopy;  Laterality: N/A;    CORONARY STENT PLACEMENT  01/2000    DISSECTION OF NECK Bilateral 01/04/2022    Procedure: DISSECTION, NECK;  Surgeon: Naeem Smalls MD;  [facility]  Service: ENT;  Laterality: Bilateral;    ESOPHAGOGASTRODUODENOSCOPY N/A 09/27/2022    Procedure: EGD (ESOPHAGOGASTRODUODENOSCOPY);  Surgeon: Donnie Peterson MD;  Location: [facility] ENDO ([facility] FLR);  Service: Endoscopy;  Laterality: N/A;    ESOPHAGOGASTRODUODENOSCOPY N/A 09/30/2022    Procedure: EGD (ESOPHAGOGASTRODUODENOSCOPY);  Surgeon: Joy Cabrera MD;  Location: [facility] ENDO (2ND FLR);  Service: Endoscopy;   Laterality: N/A;    ESOPHAGOGASTRODUODENOSCOPY W/ PEG N/A 2022    Procedure: EGD, WITH PEG TUBE INSERTION;  Surgeon: Anthony Calabrese MD;  [facility];  Service: General;  Laterality: N/A;    EYE SURGERY      Cataract bilateral    femoral stents      bilateral    FLAP PROCEDURE N/A 2022    Procedure: CREATION, FREE FLAP;  Surgeon: Naeem Smalls MD;  [facility];  Service: ENT;  Laterality: N/A;    FLAP PROCEDURE Right 2022    Procedure: CREATION, FREE FLAP;  Surgeon: Stacey Conde MD;  [facility];  Service: ENT;  Laterality: Right;  Ischemic start 1203  Ischemic stop 1353    GLOSSECTOMY N/A 2022    Procedure: GLOSSECTOMY;  Surgeon: Naeem Smalls MD;  [facility];  Service: ENT;  Laterality: N/A;    RADICAL NECK DISSECTION Left 2022    Procedure: DISSECTION, NECK, RADICAL;  Surgeon: Naeem Smalls MD;  [facility];  Service: ENT;  Laterality: Left;    SKIN BIOPSY      SKIN CANCER EXCISION      TRACHEOTOMY N/A 2022    Procedure: TRACHEOTOMY;  Surgeon: Naeem Smalls MD;  Location: [facility] OR [address] [place] [address];  Service: ENT;  Laterality: N/A;    VASCULAR SURGERY       Family History    None       Tobacco Use    Smoking status: Former     Packs/day: 2.00     Years: 40.00     Pack years: 80.00     Types: Cigarettes     Start date: 1963     Quit date: 2018     Years since quittin.9    Smokeless tobacco: Never    Tobacco comments:     3/3 ppd x 40 yrs. Currently 3-4 cigarettes daily .He is trying  to quit. Is using a Vapor cigarettes  2-3 x's a day.   Substance and Sexual Activity    Alcohol use: Not Currently    Drug use: No    Sexual activity: Not Currently     Review of Systems   Constitutional:  Negative for fever.   HENT:  Negative for nosebleeds.    Respiratory:  Positive for shortness of breath.    Gastrointestinal:  Negative for abdominal pain.   Allergic/Immunologic: Negative for  environmental allergies.   Neurological:  Positive for dizziness.   Hematological:  Negative for adenopathy.   Objective:     Vital Signs (Most Recent):  Temp: 98 °F (36.7 °C) (03/20/23 0800)  Pulse: 90 (03/20/23 1011)  Resp: (!) 21 (03/20/23 1011)  BP: 111/68 (03/20/23 1000)  SpO2: 99 % (03/20/23 1011)   Vital Signs (24h Range):  Temp:  [97.6 °F (36.4 °C)-98.5 °F (36.9 °C)] 98 °F (36.7 °C)  Pulse:  [] 90  Resp:  [16-29] 21  SpO2:  [89 %-100 %] 99 %  BP: ()/(42-83) 111/68     Weight: 68 kg (150 lb)  Body mass index is 22.81 kg/m².        Physical Exam  HENT:      Head: Normocephalic.      Comments: No blood in oral cavity      Mouth/Throat:      Mouth: Mucous membranes are dry.   Neck:      Comments: Shiley cuffless trach in midline neck, slight amount of mucopurulent secretions and crusting around stoma edges. No granulation tissue. Difficult to understand when phonates with digital occlusion of tracheostomy tube   Neurological:      Mental Status: He is alert.       PROCEDURE NOTE  NAME OF PROCEDURE: tracheoscopy through tracheostomy tube  INDICATIONS: gag reflex precludes mirror exam,hemoptysis  FINDINGS: no evidnece of tracheo-inominate fistula. No granulation tissue or other source for bleeeding- no tears/laceration of tracheal wall.     Consent: After procedure was explained in detail and all questions answered, verbal consent was obtained for performing flexible laryngoscopy.  Anesthesia: topical 4% lidocaine and neosynephrine  Procedure: With patient in seated position, the scope was inserted into the tracheostomy tube to the level of the sherine and then backed out to mid trachea and tracheostomy tube was backed out over the scope to evaluate the entirety of the track of the trechea and evaluate for presence of soft tissue breakdown or potential tracheo inominate fistula. Once trach tube backed out completely to level of skin, trach tube replaced over the scope and scope removed. Attempt to scope  through the bilateral nasal passageway was unsuccessful and patient did not tolerate this. The scope was placed through the oral cavity and into the oropharynx with partial view, no blood or mass definitively seen. Patient refused further attempts at scope.   Nasal cavity: Turbinates with mild hypertrophy. No bleeding No purulent drainage.   Oropharynx: no overt mass or bleeding-see above  Hypopharynx: no overt mass nor bleeding        Significant Labs:  CBC:   Recent Labs   Lab 03/20/23  0205   WBC 11.20   RBC 2.67*   HGB 8.0*   HCT 24.7*      MCV 93   MCH 30.0   MCHC 32.4     CMP:   Recent Labs   Lab 03/19/23  0413 03/20/23  0205   * 146*   CALCIUM 8.2* 8.4*   ALBUMIN 2.4*  --    PROT 5.5*  --     143   K 4.8 4.2   CO2 29 29    110   BUN 44* 25*   CREATININE 0.7 0.6   ALKPHOS 65  --    ALT 15  --    AST 22  --    BILITOT 0.4  --        Significant Diagnostics:  CT: I have reviewed all pertinent results/findings within the past 24 hours and my personal findings are:  no evidence of tracheal wall erosion and no vessels crossing in front of trachea where trach tube is    Assessment/Plan:     Active Diagnoses:    Diagnosis Date Noted POA    PRINCIPAL PROBLEM:  Hypotension [I95.9] 03/19/2023 Yes    Hemoptysis [R04.2] 03/19/2023 Yes    UTI (urinary tract infection) [N39.0] 03/19/2023 Yes    PEG (percutaneous endoscopic gastrostomy) status [Z93.1] 09/23/2022 Not Applicable    Tracheostomy present [Z93.0] 09/23/2022 Not Applicable    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  [E03.2] 08/24/2022 Yes    Chronic respiratory failure with hypoxia, on home O2 therapy [J96.11, Z99.81] 03/14/2022 Not Applicable    Anemia [D64.9] 02/16/2022 Yes    Coronary artery disease involving native coronary artery of native heart without angina pectoris [I25.10] 12/29/2021 Yes    Other hyperlipidemia [E78.49] 12/29/2021 Yes    Primary hypertension [I10] 12/29/2021 Yes    Squamous cell cancer  of tongue [C02.9] 12/16/2021 Yes      Problems Resolved During this Admission:     VTE Risk Mitigation (From admission, onward)           Ordered     IP VTE HIGH RISK PATIENT  Once         03/18/23 1620     Place sequential compression device  Until discontinued         03/18/23 1620     Place NIKITA hose  Until discontinued         03/18/23 1620                    Thank you for your consult. I will sign off. Please contact us if you have any additional questions.    Gwen French MD  Otorhinolaryngology-Head & Neck Surgery  [facility] [place] - Intensive Care

## 2023-03-20 NOTE — HPI
Fareed Richard Jr. 73 y.o. 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  Per his wife 4 days ago he began to have pink tinged sputum via his trach.  He was seen by his ENT 2 days ago with a scope exam and a trach exchange without evidence of bleeding.  This morning at 3:00 a.m. he developed bright red blood via trach which resolved without intervention.  It is since not recurred.  He takes a daily aspirin.  He receives all medications via G-tube.  His tube feeding regimen consists of 6 cans of Isosource daily with 120 cc free water boluses.  He denies fever chest pain shortness of breath nausea vomiting abdominal pain leg swelling syncope dizziness dysuria melena hematuria hematemesis.     In the ED, hemoglobin 7.6 INR 1.0 BUN 50 creatinine 0.7  troponin negative BRCA negative O positive type and screen chest x-ray without detrimental change hypotensive to 85/54.        Overview/Hospital Course:  No notes on file     Interval History: Very active morning with recurrent bleeding from trach and hypotension.  Rapid response called for episode of bleeding from trach and AI deterioration alert issued due to significant hypotension (SBP in 70s).  Seen twice this morning.  Initially denied any light headedness, chest pain or shortness of breath.  On second visit noted severe back/shoulder pain, but denied sob and anterior chest pain.  Left back medial to scapula was TTP.  PRBC and saline bolus infusing.  Checking cta chest, ekg, troponin, dimer and lactic acid.  Transferring to ICU.     On trach. Denies CP or SOB  EKG NSR IRBBB minor NSSTT changes  Troponin 0.01 to 1.7 to 2.2  HCT 21 to 24    Echo 9/24/22  The left ventricle is normal in size with concentric remodeling and normal systolic function. The estimated ejection fraction is 55%.  Normal right ventricular size with normal right ventricular  systolic function.  Grade I left ventricular diastolic dysfunction.  Mild left atrial enlargement.  The aortic root is mildly dilated.  The estimated PA systolic pressure is 34 mmHg.  Normal central venous pressure (3 mmHg).    Followed by Dr Stevo DOUGLASS:  This is a 72-year-old male with a history of CAD status post RCA PCI in 2000, carotid stenosis status post carotid enterectomy in 2018, PAD status post bilateral femoral atherectomy and multiple lower extremity interventions last in 2018, right CEA in 2018, hypertension, hyperlipidemia, COPD on home O2, previous tob, metastatic SCC of the tongue status post surgery and chemo radiation presenting for follow-up.     Patient was initially evaluated by me 1 year ago for preoperative evaluation prior to anticipated had neck surgery for squamous cell carcinoma of the tongue. He has done well from a cancer perspective and recent PET scan results showing no e/o active malignancy.     Post-op he has been struggling with SERRANO worse than baseline. He is on home O2. No chest pain. He does have chronic dyspnea and is on home O2 for his COPD for years.       He has a h/o RCA PCI, no history of congestive heart failure or cardiomyopathy. Nml stress 2018. The patient additionally has a significant PAD history s/p right CEA in 2018. He has no history of stroke or neurological deficits. He had bilateral femoral endarterectomy with staged lower extremity interventions as well. He has mild, chronic claudication after walking a few blocks that has been stable since 2018. This is much improved compared to pre-intervention and not limiting. No rest pain or ulcers.      The patient has been on the same CV regimen for some time including asa 81x1, clopidogrel 75x1, metoprolol succinate 200x2, losartan 100x1, amlodipine 10x1, and atorvastatin 20x1. He reports controlled Bps on this regimen. No bleeding issues.      He was recently started on prednisone and a CT chest was ordered.

## 2023-03-20 NOTE — ASSESSMENT & PLAN NOTE
-Patient with acute on chronic hypoxic respiratory failure  -At home is on 5L O2 via trach and O2 sats typically in high 80s low 90s  -Sats presently similar, but is having episodes of hemoptysis  -Acute etiology is likely due to pneumonia, mucus plugging and hemoptysis.  -Consulted pulm-critical care and input appreciated  -Discussed with ENT on call at [facility] 3/19 and OK to keep at [facility].  Dr. French to see patient today  -Continue antibiotics as above.  -Continue supplemental O2 via Trach and wean as able.  Today down to 8L.

## 2023-03-20 NOTE — ASSESSMENT & PLAN NOTE
-BP typically in the 90s to low 100s systolic per chart review.   -Noted hypotension 3/19 which is addressed above.  -Holding home metoprolol and amlodipine

## 2023-03-20 NOTE — PROGRESS NOTES
"[facility] Medicine  Progress Note    Patient Name: Fareed Richard Jr.  MRN: [id number]  Patient Class: IP- Inpatient   Admission Date: 3/18/2023  Length of Stay: 1 days  Attending Physician: Kodi Escoto MD  Primary Care Provider: Kodi Tubbs MD        Subjective:     Principal Problem:Hypotension        HPI:  Fareed Richard Jr. 73 y.o. 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  Per his wife 4 days ago he began to have pink tinged sputum via his trach.  He was seen by his ENT 2 days ago with a scope exam and a trach exchange without evidence of bleeding.  This morning at 3:00 a.m. he developed bright red blood via trach which resolved without intervention.  It is since not recurred.  He takes a daily aspirin.  He receives all medications via G-tube.  His tube feeding regimen consists of 6 cans of Isosource daily with 120 cc free water boluses.  He denies fever chest pain shortness of breath nausea vomiting abdominal pain leg swelling syncope dizziness dysuria melena hematuria hematemesis.    In the ED, hemoglobin 7.6 INR 1.0 BUN 50 creatinine 0.7  troponin negative BRCA negative O positive type and screen chest x-ray without detrimental change hypotensive to 85/54.      Overview/Hospital Course:  No notes on file    Interval History: No acute events overnight.  BP has been stable.  No further bleeding from his trach.  Noted bump in troponin last night but no chest pain.  He notes he is not sleeping well in the hospital and is asking for medication for "panicking".  Denies any pain or shortness of breath.  Back pain is well controlled with pain medication.  All questions answered and patient had no further complaints.    Objective:     Vital Signs (Most Recent):  Temp: 98.3 °F (36.8 °C) (03/20/23 1115)  Pulse: 91 (03/20/23 1100)  Resp: (!) 21 (03/20/23 " 1100)  BP: 112/68 (03/20/23 1100)  SpO2: (!) 92 % (03/20/23 1100)   Vital Signs (24h Range):  Temp:  [97.6 °F (36.4 °C)-98.5 °F (36.9 °C)] 98.3 °F (36.8 °C)  Pulse:  [] 91  Resp:  [16-29] 21  SpO2:  [90 %-100 %] 92 %  BP: ()/(46-83) 112/68     Weight: 68 kg (150 lb)  Body mass index is 22.81 kg/m².    Intake/Output Summary (Last 24 hours) at 3/20/2023 1150  Last data filed at 3/20/2023 0721  Gross per 24 hour   Intake 2628.89 ml   Output 1575 ml   Net 1053.89 ml        Physical Exam  Vitals and nursing note reviewed.   Constitutional:       General: He is not in acute distress.     Appearance: He is well-developed. He is ill-appearing. He is not diaphoretic.      Comments: disheveled   HENT:      Head: Normocephalic and atraumatic.      Right Ear: External ear normal.      Left Ear: External ear normal.   Eyes:      General:         Right eye: No discharge.         Left eye: No discharge.      Conjunctiva/sclera: Conjunctivae normal.   Neck:      Thyroid: No thyromegaly.      Comments: Trach in place.  No evidence of bleeding now.  Cardiovascular:      Rate and Rhythm: Normal rate and regular rhythm.      Heart sounds: No murmur heard.  Pulmonary:      Effort: Pulmonary effort is normal. No respiratory distress.      Breath sounds: Normal breath sounds.   Abdominal:      General: Bowel sounds are normal. There is no distension.      Palpations: Abdomen is soft. There is no mass.      Tenderness: There is no abdominal tenderness.      Comments: Peg tube in place   Musculoskeletal:         General: No deformity.      Cervical back: Normal range of motion and neck supple.   Skin:     General: Skin is warm and dry.   Neurological:      Mental Status: He is alert and oriented to person, place, and time.      Comments: Symmetric movement of BUE and BLE against gravity. Attempts to speak and communicates by writing in his notebook.  Diffusely weak   Psychiatric:      Comments: Anxious       Significant Labs:  All pertinent labs within the past 24 hours have been reviewed.    Significant Imaging: I have reviewed all pertinent imaging results/findings within the past 24 hours.        Assessment/Plan:      * Hypotension  -Admitted to inpatient status  -On 3/19 developed hypoxia, hypotension, bleeding from tracheostomy / hemoptysis and severe back pain so moved to ICU  -Prior clinic notes have shown soft blood pressure - but not this low (SBP in 70s).  -Etiology unclear - concerning for hemorrhage given bleeding at trach site and anemia, but could also be sepsis due to UTI (given urine culture growing Enterococcus) or cardiogenic (given troponin bump and severe back pain)  -Lactic acid was normal.  D-dimer minimally elevated.  Troponin bumped to 2.22  AM cortisol only 8.6.  -CTA chest showed no PE, mucoid impaction, small airway disease, and consolidation at the lung bases bilaterally, increased from prior, increased moderate left pleural effusion, emphysema with evidence of pulmonary hypertension.  -Cardiology and pulmonology consulted and input appreciated  -On 3/19 he was transfused 2 units PRBC, bolused IV fluids, started on broad spectrum antibiotics and treated with TXA nebulizers.  Pulmonology felt ct scan likely represented pneumonia.  ENT on call for system felt OK to keep at [facility].  He did not require pressors.  His blood pressure has stabilized, bleeding appears to have stopped and Hb has improved.  -Urine culture is growing E.faecalis S >100,000cfu/ml - await sensitivities.  Blodo cultures negative thus far.  Continue vancomycin and cefepime for now.  -AM cortisol is low.  Will check cosyntropin stim test.  -Bump in troponin without chest pain felt secondary to demand ischemia.  Echo noted preserved EF and inferio-basilar hypokinesis.  Not a good candidate for LHC or blood thinning medications.  Cardiology recommended outpatient stress test.  -Continue in ICU for 24 hours more  -Await ENT consult -   Manolo is aware of consult.    Acute blood loss anemia  -Hb on admit 7.6 and this morning 8.0  -Baseline Hb 8-9.  -Presented w intermittent hemoptysis via trach x 2 days. Had another episode on morning of 3/19/2023  -Seen at ENT 2 days prior to admit (Dr. Smalls) with exam without source. Trache exchanged at that time.   -Ferritin only 84 and Tsat 18% - consistent with iron deficiency  -Platelets and PT/INR are normal.  -Treated with TXA nebulizer and bleeding has stopped  -Started FeSO4 which he will need at discharge  -Repeat cbc in AM    Tracheostomy present  -Present on admit with bleeding / hemoptysis  -Treating with txa nebs and bleeding appears to have resolved  -Await ENT consult - Dr. French is aware  -Continue home glycopyrrolate and guaifensin  -Continue supplemental oxygen    Chronic respiratory failure with hypoxia, on home O2 therapy  -Patient with acute on chronic hypoxic respiratory failure  -At home is on 5L O2 via trach and O2 sats typically in high 80s low 90s  -Sats presently similar, but is having episodes of hemoptysis  -Acute etiology is likely due to pneumonia, mucus plugging and hemoptysis.  -Consulted pulm-critical care and input appreciated  -Discussed with ENT on call at [facility] 3/19 and OK to keep at [facility].  Dr. French to see patient today  -Continue antibiotics as above.  -Continue supplemental O2 via Trach and wean as able.  Today down to 8L.    Squamous cell cancer of tongue  -Diagnosed 12/15/21 via FNA.  Underwent total glossectomy, bilateral neck dissection, bilateral cervical facial flaps and anterolateral thigh flap reconstruction of glossectomy defect 1/4/22  -Completed adjuvant chemoradiation  -Followed by oncology and ENT outpt. No longer on chemo or radiation.   -Had trach changed by ENT 2 days prior to admit and scope at that time showed no signs of bleeding.   -Treatment as above.    Hemoptysis  -Treatment as above.    UTI (urinary tract infection)  -Urine culture growing  Enterococcus > 100,000 cfu/ml.  Await sensitivities  -Continue broad spectrum antibiotics for now and tailor based off results.    PEG (percutaneous endoscopic gastrostomy) status  -Continue medications via PEG. Does not take oral intake  -On isosource 1.5 6 days daily with 120cc free water flushes via wife  -Will continue home tube feedings, with nutrition consulted    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   -TSH is normal and he is not on treatment as outpatient    Other hyperlipidemia  -Continue home statin    Primary hypertension  -BP typically in the 90s to low 100s systolic per chart review.   -Noted hypotension 3/19 which is addressed above.  -Holding home metoprolol and amlodipine    Coronary artery disease involving native coronary artery of native heart without angina pectoris  -Denies chest pain but on 3/19 did have unexplained hypotension with severe back/shoulder pain  -Troponin on admit normal, but on 3/19 bumped to 1.7 and then 2.2  -No jamil ischemic change on EKG  -Echo showed EF 60%, inferobasal hypokinesis, indeterminate diastolic function  -Cardiology consulted and input appreciated  -Cardiology suspects this is due to demand ischemia associated with his anemia and hypotension.  Further, he is a poor candidate for angiogram given his hemoptysis and inability to treat with blood thinning medications.  Recommended outpatient stress testing  -Holding aspirin given hemoptysis and metoprolol given hypotension  -Continue statin.  Resume BB as able.      VTE Risk Mitigation (From admission, onward)         Ordered     IP VTE HIGH RISK PATIENT  Once         03/18/23 1620     Place sequential compression device  Until discontinued         03/18/23 1620     Place NIKITA hose  Until discontinued         03/18/23 1620                Discharge Planning   NISA: 3/22/2023     Code Status: Full Code   Is the patient medically ready for discharge?:     Reason for patient still in hospital  (select all that apply): Treatment and PT / OT recommendations  Discharge Plan A: Home with family            Critical care time spent on the evaluation and treatment of severe organ dysfunction, review of pertinent labs and imaging studies, discussions with consulting providers and discussions with patient/family: 35 minutes.      Kodi Escoto MD  Department of Hospital Medicine   [facility]

## 2023-03-20 NOTE — NURSING
Consulted to evaluate RUE midline placed today. RUE midline dressing with moderate bloody drainage. RUE midline dressing changed. No evidence of further bleeding at the site. Handoff to KAMILAH Sylvester.

## 2023-03-20 NOTE — ASSESSMENT & PLAN NOTE
-Present on admit with bleeding / hemoptysis  -Treating with txa nebs and bleeding appears to have resolved  -Await ENT consult - Dr. French is aware  -Continue home glycopyrrolate and guaifensin  -Continue supplemental oxygen

## 2023-03-20 NOTE — ASSESSMENT & PLAN NOTE
-Diagnosed 12/15/21 via FNA.  Underwent total glossectomy, bilateral neck dissection, bilateral cervical facial flaps and anterolateral thigh flap reconstruction of glossectomy defect 1/4/22  -Completed adjuvant chemoradiation  -Followed by oncology and ENT outpt. No longer on chemo or radiation.   -Had trach changed by ENT 2 days prior to admit and scope at that time showed no signs of bleeding.   -Treatment as above.

## 2023-03-20 NOTE — CARE UPDATE
Notified that patient had episode of O2 desaturation into mid-80s with increased work of breathing and tachycardia. He was suctioned and given duoneb with improved work of breathing.  Continues to note severe shoulder pain radiating across back which has been unreleaved with oxycodone and lidoerm patches.  He was given morphine.    EKG shows significant changes - now sinus tachycardia but with a wide complex which is new.  I am concerned that his shoulder pain may be an anginal equivalent.  Discussed with Dr. Benitez.  Will give aspirin x 1, heparin drip and start nitroglycerin drip.  Given his recent bleeding, and risk for recurrence, Dr. Benitez does not feel he is a candidate for [facility] at this time as he cannot be on DAPT.  Troponin now up to 3.4 and CXR shows evidence of pulmonary edema.    Discussed this difficult situation with patient and wife at bedside and gave them the update.    Assessment:  NSTEMI    Plan:  -Await repeat cxr and troponin  -Order PICC line  -Start heparin and nitroglycerin drip as stopgap measures for suspected ACS as coronary angiogram is not an option at this time.  -Give IV lasix x 1 dose now.  -Continue close monitoring in ICU  -Patient is full code.    35 minutes cc time

## 2023-03-20 NOTE — CONSULTS
[facility] - Intensive Care  Cardiology  Consult Note    Patient Name: Fareed Richard Jr.  MRN: [id number]  Admission Date: 3/18/2023  Hospital Length of Stay: 1 days  Code Status: Full Code   Attending Provider: Kodi Escoto MD   Consulting Provider: Tacos Benitez MD  Primary Care Physician: Kodi Tubbs MD  Principal Problem:Hypotension    Patient information was obtained from patient and ER records.     Consults  Subjective:     Chief Complaint:  Elevated troponin, CAD     HPI:   Fareed Richard Jr. 73 y.o. 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  Per his wife 4 days ago he began to have pink tinged sputum via his trach.  He was seen by his ENT 2 days ago with a scope exam and a trach exchange without evidence of bleeding.  This morning at 3:00 a.m. he developed bright red blood via trach which resolved without intervention.  It is since not recurred.  He takes a daily aspirin.  He receives all medications via G-tube.  His tube feeding regimen consists of 6 cans of Isosource daily with 120 cc free water boluses.  He denies fever chest pain shortness of breath nausea vomiting abdominal pain leg swelling syncope dizziness dysuria melena hematuria hematemesis.     In the ED, hemoglobin 7.6 INR 1.0 BUN 50 creatinine 0.7  troponin negative BRCA negative O positive type and screen chest x-ray without detrimental change hypotensive to 85/54.        Overview/Hospital Course:  No notes on file     Interval History: Very active morning with recurrent bleeding from trach and hypotension.  Rapid response called for episode of bleeding from trach and AI deterioration alert issued due to significant hypotension (SBP in 70s).  Seen twice this morning.  Initially denied any light headedness, chest pain or shortness of breath.  On second visit noted severe back/shoulder pain, but denied sob and  anterior chest pain.  Left back medial to scapula was TTP.  PRBC and saline bolus infusing.  Checking cta chest, ekg, troponin, dimer and lactic acid.  Transferring to ICU.     On trach. Denies CP or SOB  EKG NSR IRBBB minor NSSTT changes  Troponin 0.01 to 1.7 to 2.2  HCT 21 to 24    Echo 9/24/22   The left ventricle is normal in size with concentric remodeling and normal systolic function. The estimated ejection fraction is 55%.   Normal right ventricular size with normal right ventricular systolic function.   Grade I left ventricular diastolic dysfunction.   Mild left atrial enlargement.   The aortic root is mildly dilated.   The estimated PA systolic pressure is 34 mmHg.   Normal central venous pressure (3 mmHg).    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     Patient was initially evaluated by me 1 year ago for preoperative evaluation prior to anticipated had neck surgery for squamous cell carcinoma of the tongue. He has done well from a cancer perspective and recent PET scan results showing no e/o active malignancy.     Post-op he has been struggling with SERRANO worse than baseline. He is on home O2. No chest pain. He does have chronic dyspnea and is on home O2 for his COPD for years.       He has a h/o RCA PCI, no history of congestive heart failure or cardiomyopathy. Nml stress 2018. The patient additionally has a significant PAD history s/p right CEA in 2018. He has no history of stroke or neurological deficits. He had bilateral femoral endarterectomy with staged lower extremity interventions as well. He has  mild, chronic claudication after walking a few blocks that has been stable since 2018. This is much improved compared to pre-intervention and not limiting. No rest pain or ulcers.      The patient has been on the same CV regimen for some time including asa 81x1, clopidogrel 75x1, metoprolol succinate 200x2, losartan 100x1, amlodipine 10x1, and atorvastatin 20x1. He reports controlled Bps on this regimen. No bleeding issues.      He was recently started on prednisone and a CT chest was ordered.         Past Medical History:   Diagnosis Date    Cancer     skin to Rt forearm and face    COPD (chronic obstructive pulmonary disease)     Hyperlipidemia     Hypertension     Pseudoaneurysm        Past Surgical History:   Procedure Laterality Date    ABDOMINAL SURGERY      stents placed in liver and large intestines, per patient    CAROTID STENT      COLONOSCOPY N/A 09/27/2022    Procedure: COLONOSCOPY;  Surgeon: Donnie Peterson MD;  Location: [facility] ENDO (2ND FLR);  Service: Endoscopy;  Laterality: N/A;    COLONOSCOPY N/A 09/30/2022    Procedure: COLONOSCOPY;  Surgeon: Joy Cabrera MD;  Location: [facility] (2ND FLR);  Service: Endoscopy;  Laterality: N/A;    CORONARY STENT PLACEMENT  01/2000    DISSECTION OF NECK Bilateral 01/04/2022    Procedure: DISSECTION, NECK;  Surgeon: Naeem Smalls MD;  [facility];  Service: ENT;  Laterality: Bilateral;    ESOPHAGOGASTRODUODENOSCOPY N/A 09/27/2022    Procedure: EGD (ESOPHAGOGASTRODUODENOSCOPY);  Surgeon: Donnie Peterson MD;  Location: [facility] ENDO (2ND FLR);  Service: Endoscopy;  Laterality: N/A;    ESOPHAGOGASTRODUODENOSCOPY N/A 09/30/2022    Procedure: EGD (ESOPHAGOGASTRODUODENOSCOPY);  Surgeon: Joy Cabrera MD;  Location: [facility] ENDO (2ND FLR);  Service: Endoscopy;  Laterality: N/A;    ESOPHAGOGASTRODUODENOSCOPY W/ PEG N/A 01/04/2022    Procedure: EGD, WITH PEG TUBE INSERTION;  Surgeon: Anthony Calabrese MD;  [facility];  Service:  General;  Laterality: N/A;    EYE SURGERY      Cataract bilateral    femoral stents      bilateral    FLAP PROCEDURE N/A 2022    Procedure: CREATION, FREE FLAP;  Surgeon: Naeem Smalls MD;  Location: [facility] OR 2ND FLR;  Service: ENT;  Laterality: N/A;    FLAP PROCEDURE Right 2022    Procedure: CREATION, FREE FLAP;  Surgeon: Stacey Conde MD;  Location: [facility] OR [facility] FLR;  Service: ENT;  Laterality: Right;  Ischemic start 1203  Ischemic stop 1353    GLOSSECTOMY N/A 2022    Procedure: GLOSSECTOMY;  Surgeon: Naeem Smalls MD;  Location: [facility] OR [facility] FLR;  Service: ENT;  Laterality: N/A;    RADICAL NECK DISSECTION Left 2022    Procedure: DISSECTION, NECK, RADICAL;  Surgeon: Naeem Smalls MD;  [facility];  Service: ENT;  Laterality: Left;    SKIN BIOPSY      SKIN CANCER EXCISION      TRACHEOTOMY N/A 2022    Procedure: TRACHEOTOMY;  Surgeon: Naeem Smalls MD;  [facility];  Service: ENT;  Laterality: N/A;    VASCULAR SURGERY         Review of patient's allergies indicates:  No Known Allergies    No current facility-administered medications on file prior to encounter.     Current Outpatient Medications on File Prior to Encounter   Medication Sig    amLODIPine (NORVASC) 10 MG tablet Take 10 mg by mouth.    atorvastatin (LIPITOR) 20 MG tablet 1 tablet (20 mg total) by Per G Tube route once daily.    clopidogreL (PLAVIX) 75 mg tablet Take 75 mg by mouth once daily.    glycopyrrolate (CUVPOSA) 1 mg/5 mL (0.2 mg/mL) Soln Take 5 mLs (1 mg total) by mouth 3 (three) times daily as needed (TO REDUCE SECRETIONS).    guaiFENesin 200 mg/5 mL Liqd Take 400 mg by mouth every 4 (four) hours as needed (for secretions).    hydrOXYzine HCL (ATARAX) 25 MG tablet SMARTSI.5 Tablet(s) Gastro Tube Every 8 Hours PRN    melatonin (MELATIN) 3 mg tablet 2 tablets (6 mg total) by Per G Tube route nightly.    metoprolol succinate (TOPROL-XL) 200 MG  24 hr tablet Take 200 mg by mouth 2 (two) times daily.    multivit-min/FA/lycopen/lutein (CENTRUM SILVER MEN ORAL) Take by mouth.    omeprazole (PRILOSEC) 40 MG capsule Take 40 mg (contents of one capsule) twice daily through PEG tube. Mix contents of capsule with 10 mL applesauce.    sodium chloride 3% 3 % nebulizer solution Take 4 mLs by nebulization 2 (two) times a day.    WIXELA INHUB 250-50 mcg/dose diskus inhaler SMARTSI Puff(s) By Mouth Every 12 Hours    bisacodyL (DULCOLAX) 10 mg Supp Place 1 suppository (10 mg total) rectally daily as needed.    oxyCODONE (ROXICODONE) 5 mg/5 mL Soln 5 mLs (5 mg total) by Per G Tube route every 4 (four) hours as needed (Pain). (Patient not taking: Reported on 2023)    polyethylene glycol (GLYCOLAX) 17 gram PwPk 17 g by Per G Tube route 2 (two) times daily.    sodium chloride (OCEAN) 0.65 % nasal spray 1 spray by Nasal route as needed for Congestion. (Patient not taking: Reported on 2023)    [DISCONTINUED] losartan (COZAAR) 50 MG tablet 1 tablet (50 mg total) by Per G Tube route once daily.    [DISCONTINUED] metoprolol tartrate (LOPRESSOR) 100 MG tablet 1 tablet (100 mg total) by Per G Tube route 2 (two) times daily.     Family History    None       Tobacco Use    Smoking status: Former     Packs/day: 2.00     Years: 40.00     Pack years: 80.00     Types: Cigarettes     Start date: 1963     Quit date: 2018     Years since quittin.9    Smokeless tobacco: Never    Tobacco comments:     3/3 ppd x 40 yrs. Currently 3-4 cigarettes daily .He is trying  to quit. Is using a Vapor cigarettes  2-3 x's a day.   Substance and Sexual Activity    Alcohol use: Not Currently    Drug use: No    Sexual activity: Not Currently     Review of Systems   Unable to perform ROS: Other   Objective:     Vital Signs (Most Recent):  Temp: 98.4 °F (36.9 °C) (23 0305)  Pulse: 86 (23 0801)  Resp: (!) 26 (23 0305)  BP: 116/73 (23 0635)  SpO2:  99 % (03/20/23 0801)   Vital Signs (24h Range):  Temp:  [97.6 °F (36.4 °C)-98.5 °F (36.9 °C)] 98.4 °F (36.9 °C)  Pulse:  [] 86  Resp:  [17-29] 26  SpO2:  [89 %-100 %] 99 %  BP: ()/(42-83) 116/73     Weight: 68 kg (150 lb)  Body mass index is 22.81 kg/m².    SpO2: 99 %         Intake/Output Summary (Last 24 hours) at 3/20/2023 0823  Last data filed at 3/20/2023 0600  Gross per 24 hour   Intake 2738.89 ml   Output 1575 ml   Net 1163.89 ml       Lines/Drains/Airways       Drain  Duration                  Gastrostomy/Enterostomy 01/04/22 Percutaneous endoscopic gastrostomy (PEG)  days              Airway  Duration             Adult Surgical Airway 03/16/23 1014 Shiley Uncuffed 6.0 3 days              Peripheral Intravenous Line  Duration                  Midline Catheter Insertion/Assessment  - Single Lumen 03/19/23 1335 Right basilic vein (medial side of arm) other (see comments) <1 day                    Physical Exam  Constitutional:       Appearance: He is well-developed.   HENT:      Head: Normocephalic and atraumatic.   Eyes:      Conjunctiva/sclera: Conjunctivae normal.      Pupils: Pupils are equal, round, and reactive to light.   Cardiovascular:      Rate and Rhythm: Normal rate.      Pulses: Intact distal pulses.      Heart sounds: Normal heart sounds.   Pulmonary:      Effort: Pulmonary effort is normal.      Breath sounds: Normal breath sounds.   Abdominal:      General: Bowel sounds are normal.      Palpations: Abdomen is soft.   Musculoskeletal:         General: Normal range of motion.      Cervical back: Normal range of motion and neck supple.   Skin:     General: Skin is warm and dry.   Neurological:      Mental Status: He is alert and oriented to person, place, and time.       Significant Labs: All pertinent lab results from the last 24 hours have been reviewed.    Significant Imaging: Echocardiogram: 2D echo with color flow doppler: No results found for this or any previous  visit.    Assessment and Plan:     * Hypotension  Possibly from bleeding. Pressors as needed    Hemoptysis  Per pulmonary. ASA/plavix on hold    Tracheostomy present  Per ENT    Other hyperlipidemia  On statin    Coronary artery disease involving native coronary artery of native heart without angina pectoris  Remote Hx PCI to RCA 2000. Troponin up to 2.2. Denies CP. EKG with mild NSSTT changes. Likely demand ischemia from hypotension and anemia. Poor candidate for anticoagulation or LHC with high bleeding risk and hemoptysis. Check echo - if stable ok for out patient ischemic evaluation when able to tolerate anticoagulation        VTE Risk Mitigation (From admission, onward)         Ordered     IP VTE HIGH RISK PATIENT  Once         03/18/23 1620     Place sequential compression device  Until discontinued         03/18/23 1620     Place NIKITA hose  Until discontinued         03/18/23 1620              35 minutes spent with patient in ICU    Thank you for your consult. I will follow-up with patient. Please contact us if you have any additional questions.    Tacos Benitez MD  Cardiology   [facility] - Intensive Care

## 2023-03-20 NOTE — SUBJECTIVE & OBJECTIVE
"Interval History: No further hemoptysis noted. Oxygen requirements stable. Mouthed, "I'm ready to get out of here."    Objective:     Vital Signs (Most Recent):  Temp: 98.7 °F (37.1 °C) (03/20/23 1600)  Pulse: 96 (03/20/23 1600)  Resp: (!) 22 (03/20/23 1600)  BP: 100/62 (03/20/23 1600)  SpO2: (!) 94 % (03/20/23 1600)   Vital Signs (24h Range):  Temp:  [97.6 °F (36.4 °C)-98.7 °F (37.1 °C)] 98.7 °F (37.1 °C)  Pulse:  [] 96  Resp:  [16-30] 22  SpO2:  [88 %-100 %] 94 %  BP: ()/(46-83) 100/62     Weight: 68 kg (150 lb)  Body mass index is 22.81 kg/m².      Intake/Output Summary (Last 24 hours) at 3/20/2023 1606  Last data filed at 3/20/2023 1604  Gross per 24 hour   Intake 2199.21 ml   Output 1200 ml   Net 999.21 ml       Physical Exam  Vitals and nursing note reviewed.   Constitutional:       General: He is not in acute distress.     Appearance: He is ill-appearing. He is not toxic-appearing.   Neck:      Comments: Tracheostomy in place.  Cardiovascular:      Rate and Rhythm: Normal rate and regular rhythm.      Heart sounds: Normal heart sounds. No murmur heard.    No gallop.   Pulmonary:      Effort: Pulmonary effort is normal.      Breath sounds: No wheezing, rhonchi or rales.   Abdominal:      General: There is no distension.      Palpations: Abdomen is soft.      Tenderness: There is no abdominal tenderness.   Musculoskeletal:      Right lower leg: No edema.      Left lower leg: No edema.   Neurological:      Mental Status: He is alert and oriented to person, place, and time. Mental status is at baseline.   Psychiatric:         Mood and Affect: Mood normal.         Thought Content: Thought content normal.     Review of Systems    Vents:  Oxygen Concentration (%): 40 (03/20/23 1600)    Lines/Drains/Airways       Drain  Duration                  Gastrostomy/Enterostomy 01/04/22 Percutaneous endoscopic gastrostomy (PEG)  days              Airway  Duration             Adult Surgical Airway 03/16/23 " 1014 Shiley Uncuffed 6.0 4 days              Peripheral Intravenous Line  Duration                  Midline Catheter Insertion/Assessment  - Single Lumen 03/19/23 1335 Right basilic vein (medial side of arm) other (see comments) 1 day                    Significant Labs:    CBC/Anemia Profile:  Recent Labs   Lab 03/19/23  0413 03/19/23  1445 03/20/23  0205   WBC 9.90 8.74 11.20   HGB 6.5* 7.2* 8.0*   HCT 21.9* 22.7* 24.7*    203 176   MCV 94 93 93   RDW 14.7* 15.4* 16.1*        Chemistries:  Recent Labs   Lab 03/19/23  0413 03/20/23  0205    143   K 4.8 4.2    110   CO2 29 29   BUN 44* 25*   CREATININE 0.7 0.6   CALCIUM 8.2* 8.4*   ALBUMIN 2.4*  --    PROT 5.5*  --    BILITOT 0.4  --    ALKPHOS 65  --    ALT 15  --    AST 22  --    MG  --  2.0       All pertinent labs within the past 24 hours have been reviewed.    Significant Imaging:  I have reviewed all pertinent imaging results/findings within the past 24 hours.

## 2023-03-20 NOTE — CONSULTS
RD consult for TF recommendations    TF Recommendations are as follows:    Isosource 1.5 @ 45 mL hr 1620 kcal 73 g pro 825 mL water to meet 100% EEN    Bolus feeds:    isosource 5 cans daily to provide 1875 kcal 85 g protein and 955 mL water meeting 100% EEN

## 2023-03-20 NOTE — CARE UPDATE
03/20/23 1632   Patient Assessment/Suction   Level of Consciousness (AVPU) alert   Respiratory Effort Mild;Labored;Short of breath   Expansion/Accessory Muscles/Retractions abdominal muscle use   All Lung Fields Breath Sounds Anterior:;coarse   Rhythm/Pattern, Respiratory tachypneic;shortness of breath   Cough Frequency frequent   Cough Type productive   Suction Method tracheal   $ Suction Charges Inline Suction Procedure Stat Charge   Secretions Amount small   Secretions Color yellow   Secretions Characteristics thick   PRE-TX-O2   Device (Oxygen Therapy) tracheostomy collar   Flow (L/min) 10   Oxygen Concentration (%) 80   SpO2 (!) 92 %   Pulse (!) 120   Resp (!) 26   Aerosol Therapy   $ Aerosol Therapy Charges Aerosol Treatment   Daily Review of Necessity (SVN) completed   Respiratory Treatment Status (SVN) given   Treatment Route (SVN) in-line;tracheostomy   Patient Position (SVN) HOB elevated   Post Treatment Assessment (SVN) breath sounds unchanged   Signs of Intolerance (SVN) none   Breath Sounds Post-Respiratory Treatment   Throughout All Fields Post-Treatment All Fields   Throughout All Fields Post-Treatment no change   Post-treatment Heart Rate (beats/min) 115   Post-treatment Resp Rate (breaths/min) 22   Ready to Wean/Extubation Screen   FIO2<=50 (chart decimal) (!) 0.8     Patient increased WOB and bag lavage and suction him. Minimal secretions, patient vomited some. He was given prn treatment due to increased WOB, also patient o2 increased to 10 liters and 80% fio2 on trach collar. He is improved and comfortable.

## 2023-03-20 NOTE — PLAN OF CARE
Transferred to ICU for closer monitoring of hypotension. Blood pressure improved with blood infusions and fluids. Complaints of back pain, improved with lidocaine patches. Suctioning done as needed, still small amounts of blood present when suctioning. Midline placed. Voiding per urinal. Free of falls, injury, or breakdown.

## 2023-03-20 NOTE — ASSESSMENT & PLAN NOTE
-Denies chest pain but on 3/19 did have unexplained hypotension with severe back/shoulder pain  -Troponin on admit normal, but on 3/19 bumped to 1.7 and then 2.2  -No jamil ischemic change on EKG  -Echo showed EF 60%, inferobasal hypokinesis, indeterminate diastolic function  -Cardiology consulted and input appreciated  -Cardiology suspects this is due to demand ischemia associated with his anemia and hypotension.  Further, he is a poor candidate for angiogram given his hemoptysis and inability to treat with blood thinning medications.  Recommended outpatient stress testing  -Holding aspirin given hemoptysis and metoprolol given hypotension  -Continue statin.  Resume BB as able.

## 2023-03-21 ENCOUNTER — TELEPHONE (OUTPATIENT)
Dept: SPEECH THERAPY | Facility: HOSPITAL | Age: 73
End: 2023-03-21
Payer: MEDICARE

## 2023-03-21 PROBLEM — J96.01 ACUTE RESPIRATORY FAILURE WITH HYPOXIA: Status: ACTIVE | Noted: 2022-04-19

## 2023-03-21 LAB
ACTH PLAS-MCNC: 14 PG/ML (ref 0–46)
ANION GAP SERPL CALC-SCNC: 6 MMOL/L (ref 8–16)
BASOPHILS # BLD AUTO: 0.02 K/UL (ref 0–0.2)
BASOPHILS # BLD AUTO: 0.02 K/UL (ref 0–0.2)
BASOPHILS NFR BLD: 0.2 % (ref 0–1.9)
BASOPHILS NFR BLD: 0.2 % (ref 0–1.9)
BNP SERPL-MCNC: 2543 PG/ML (ref 0–99)
BUN SERPL-MCNC: 19 MG/DL (ref 8–23)
CALCIUM SERPL-MCNC: 8.3 MG/DL (ref 8.7–10.5)
CHLORIDE SERPL-SCNC: 102 MMOL/L (ref 95–110)
CO2 SERPL-SCNC: 31 MMOL/L (ref 23–29)
CREAT SERPL-MCNC: 0.7 MG/DL (ref 0.5–1.4)
DIFFERENTIAL METHOD: ABNORMAL
DIFFERENTIAL METHOD: ABNORMAL
EOSINOPHIL # BLD AUTO: 0.2 K/UL (ref 0–0.5)
EOSINOPHIL # BLD AUTO: 0.2 K/UL (ref 0–0.5)
EOSINOPHIL NFR BLD: 2.2 % (ref 0–8)
EOSINOPHIL NFR BLD: 2.2 % (ref 0–8)
ERYTHROCYTE [DISTWIDTH] IN BLOOD BY AUTOMATED COUNT: 15.9 % (ref 11.5–14.5)
ERYTHROCYTE [DISTWIDTH] IN BLOOD BY AUTOMATED COUNT: 15.9 % (ref 11.5–14.5)
EST. GFR  (NO RACE VARIABLE): >60 ML/MIN/1.73 M^2
FACT X PPP CHRO-ACNC: 0.1 IU/ML (ref 0.3–0.7)
FACT X PPP CHRO-ACNC: 0.29 IU/ML (ref 0.3–0.7)
GLUCOSE SERPL-MCNC: 177 MG/DL (ref 70–110)
HCT VFR BLD AUTO: 25.5 % (ref 40–54)
HCT VFR BLD AUTO: 25.5 % (ref 40–54)
HGB BLD-MCNC: 7.7 G/DL (ref 14–18)
HGB BLD-MCNC: 7.7 G/DL (ref 14–18)
IMM GRANULOCYTES # BLD AUTO: 0.06 K/UL (ref 0–0.04)
IMM GRANULOCYTES # BLD AUTO: 0.06 K/UL (ref 0–0.04)
IMM GRANULOCYTES NFR BLD AUTO: 0.6 % (ref 0–0.5)
IMM GRANULOCYTES NFR BLD AUTO: 0.6 % (ref 0–0.5)
LYMPHOCYTES # BLD AUTO: 0.7 K/UL (ref 1–4.8)
LYMPHOCYTES # BLD AUTO: 0.7 K/UL (ref 1–4.8)
LYMPHOCYTES NFR BLD: 7.2 % (ref 18–48)
LYMPHOCYTES NFR BLD: 7.2 % (ref 18–48)
MAGNESIUM SERPL-MCNC: 1.8 MG/DL (ref 1.6–2.6)
MCH RBC QN AUTO: 28.1 PG (ref 27–31)
MCH RBC QN AUTO: 28.1 PG (ref 27–31)
MCHC RBC AUTO-ENTMCNC: 30.2 G/DL (ref 32–36)
MCHC RBC AUTO-ENTMCNC: 30.2 G/DL (ref 32–36)
MCV RBC AUTO: 93 FL (ref 82–98)
MCV RBC AUTO: 93 FL (ref 82–98)
MONOCYTES # BLD AUTO: 1.1 K/UL (ref 0.3–1)
MONOCYTES # BLD AUTO: 1.1 K/UL (ref 0.3–1)
MONOCYTES NFR BLD: 10.7 % (ref 4–15)
MONOCYTES NFR BLD: 10.7 % (ref 4–15)
NEUTROPHILS # BLD AUTO: 8 K/UL (ref 1.8–7.7)
NEUTROPHILS # BLD AUTO: 8 K/UL (ref 1.8–7.7)
NEUTROPHILS NFR BLD: 79.1 % (ref 38–73)
NEUTROPHILS NFR BLD: 79.1 % (ref 38–73)
NRBC BLD-RTO: 0 /100 WBC
NRBC BLD-RTO: 0 /100 WBC
PLATELET # BLD AUTO: 191 K/UL (ref 150–450)
PLATELET # BLD AUTO: 191 K/UL (ref 150–450)
PMV BLD AUTO: 9.3 FL (ref 9.2–12.9)
PMV BLD AUTO: 9.3 FL (ref 9.2–12.9)
POTASSIUM SERPL-SCNC: 3.7 MMOL/L (ref 3.5–5.1)
RBC # BLD AUTO: 2.74 M/UL (ref 4.6–6.2)
RBC # BLD AUTO: 2.74 M/UL (ref 4.6–6.2)
SODIUM SERPL-SCNC: 139 MMOL/L (ref 136–145)
VANCOMYCIN TROUGH SERPL-MCNC: 16.1 UG/ML (ref 10–22)
WBC # BLD AUTO: 10.11 K/UL (ref 3.9–12.7)
WBC # BLD AUTO: 10.11 K/UL (ref 3.9–12.7)

## 2023-03-21 PROCEDURE — 63600175 PHARM REV CODE 636 W HCPCS: Performed by: INTERNAL MEDICINE

## 2023-03-21 PROCEDURE — 99223 1ST HOSP IP/OBS HIGH 75: CPT | Mod: ,,, | Performed by: REGISTERED NURSE

## 2023-03-21 PROCEDURE — 99223 PR INITIAL HOSPITAL CARE,LEVL III: ICD-10-PCS | Mod: ,,, | Performed by: REGISTERED NURSE

## 2023-03-21 PROCEDURE — 94002 VENT MGMT INPAT INIT DAY: CPT

## 2023-03-21 PROCEDURE — 99233 SBSQ HOSP IP/OBS HIGH 50: CPT | Mod: ,,, | Performed by: INTERNAL MEDICINE

## 2023-03-21 PROCEDURE — 83735 ASSAY OF MAGNESIUM: CPT | Performed by: HOSPITALIST

## 2023-03-21 PROCEDURE — 94761 N-INVAS EAR/PLS OXIMETRY MLT: CPT

## 2023-03-21 PROCEDURE — 25000003 PHARM REV CODE 250: Performed by: HOSPITALIST

## 2023-03-21 PROCEDURE — 94640 AIRWAY INHALATION TREATMENT: CPT

## 2023-03-21 PROCEDURE — 80202 ASSAY OF VANCOMYCIN: CPT | Performed by: HOSPITALIST

## 2023-03-21 PROCEDURE — 99291 PR CRITICAL CARE, E/M 30-74 MINUTES: ICD-10-PCS | Mod: ,,, | Performed by: INTERNAL MEDICINE

## 2023-03-21 PROCEDURE — 85520 HEPARIN ASSAY: CPT | Mod: 91 | Performed by: HOSPITALIST

## 2023-03-21 PROCEDURE — A4216 STERILE WATER/SALINE, 10 ML: HCPCS | Performed by: HOSPITALIST

## 2023-03-21 PROCEDURE — 99291 CRITICAL CARE FIRST HOUR: CPT | Mod: ,,, | Performed by: INTERNAL MEDICINE

## 2023-03-21 PROCEDURE — 85520 HEPARIN ASSAY: CPT | Mod: 91 | Performed by: STUDENT IN AN ORGANIZED HEALTH CARE EDUCATION/TRAINING PROGRAM

## 2023-03-21 PROCEDURE — 85025 COMPLETE CBC W/AUTO DIFF WBC: CPT | Performed by: HOSPITALIST

## 2023-03-21 PROCEDURE — 99233 PR SUBSEQUENT HOSPITAL CARE,LEVL III: ICD-10-PCS | Mod: ,,, | Performed by: INTERNAL MEDICINE

## 2023-03-21 PROCEDURE — 85520 HEPARIN ASSAY: CPT | Performed by: HOSPITALIST

## 2023-03-21 PROCEDURE — 20000000 HC ICU ROOM

## 2023-03-21 PROCEDURE — 27000221 HC OXYGEN, UP TO 24 HOURS

## 2023-03-21 PROCEDURE — 80048 BASIC METABOLIC PNL TOTAL CA: CPT | Performed by: HOSPITALIST

## 2023-03-21 PROCEDURE — 99900026 HC AIRWAY MAINTENANCE (STAT)

## 2023-03-21 PROCEDURE — 63600175 PHARM REV CODE 636 W HCPCS: Mod: TB,JG | Performed by: HOSPITALIST

## 2023-03-21 PROCEDURE — 83880 ASSAY OF NATRIURETIC PEPTIDE: CPT | Performed by: STUDENT IN AN ORGANIZED HEALTH CARE EDUCATION/TRAINING PROGRAM

## 2023-03-21 PROCEDURE — 99900035 HC TECH TIME PER 15 MIN (STAT)

## 2023-03-21 PROCEDURE — 25000242 PHARM REV CODE 250 ALT 637 W/ HCPCS: Performed by: HOSPITALIST

## 2023-03-21 PROCEDURE — 36415 COLL VENOUS BLD VENIPUNCTURE: CPT | Performed by: STUDENT IN AN ORGANIZED HEALTH CARE EDUCATION/TRAINING PROGRAM

## 2023-03-21 PROCEDURE — 25000003 PHARM REV CODE 250: Performed by: INTERNAL MEDICINE

## 2023-03-21 RX ORDER — POLYETHYLENE GLYCOL 3350 17 G/17G
17 POWDER, FOR SOLUTION ORAL 2 TIMES DAILY
Status: DISCONTINUED | OUTPATIENT
Start: 2023-03-21 | End: 2023-04-01

## 2023-03-21 RX ORDER — FUROSEMIDE 10 MG/ML
40 INJECTION INTRAMUSCULAR; INTRAVENOUS ONCE
Status: COMPLETED | OUTPATIENT
Start: 2023-03-21 | End: 2023-03-21

## 2023-03-21 RX ORDER — FENTANYL CITRATE-0.9 % NACL/PF 10 MCG/ML
0-200 PLASTIC BAG, INJECTION (ML) INTRAVENOUS CONTINUOUS
Status: DISCONTINUED | OUTPATIENT
Start: 2023-03-21 | End: 2023-03-23

## 2023-03-21 RX ORDER — FENTANYL CITRATE 50 UG/ML
50 INJECTION, SOLUTION INTRAMUSCULAR; INTRAVENOUS
Status: DISCONTINUED | OUTPATIENT
Start: 2023-03-21 | End: 2023-04-10

## 2023-03-21 RX ADMIN — Medication 10 ML: at 05:03

## 2023-03-21 RX ADMIN — Medication 10 ML: at 08:03

## 2023-03-21 RX ADMIN — CEFEPIME HYDROCHLORIDE 2 G: 2 INJECTION, SOLUTION INTRAVENOUS at 08:03

## 2023-03-21 RX ADMIN — METHYLPREDNISOLONE SODIUM SUCCINATE 60 MG: 40 INJECTION, POWDER, FOR SOLUTION INTRAMUSCULAR; INTRAVENOUS at 02:03

## 2023-03-21 RX ADMIN — CEFEPIME HYDROCHLORIDE 2 G: 2 INJECTION, SOLUTION INTRAVENOUS at 01:03

## 2023-03-21 RX ADMIN — METHYLPREDNISOLONE SODIUM SUCCINATE 60 MG: 40 INJECTION, POWDER, FOR SOLUTION INTRAMUSCULAR; INTRAVENOUS at 11:03

## 2023-03-21 RX ADMIN — Medication 12.5 MCG/HR: at 05:03

## 2023-03-21 RX ADMIN — FUROSEMIDE 40 MG: 10 INJECTION, SOLUTION INTRAMUSCULAR; INTRAVENOUS at 08:03

## 2023-03-21 RX ADMIN — Medication 10 ML: at 11:03

## 2023-03-21 RX ADMIN — FERROUS SULFATE TAB 325 MG (65 MG ELEMENTAL FE) 1 EACH: 325 (65 FE) TAB at 09:03

## 2023-03-21 RX ADMIN — ATORVASTATIN CALCIUM 20 MG: 10 TABLET, FILM COATED ORAL at 09:03

## 2023-03-21 RX ADMIN — LIDOCAINE 5% 1 PATCH: 700 PATCH TOPICAL at 01:03

## 2023-03-21 RX ADMIN — HYDROXYZINE HYDROCHLORIDE 25 MG: 25 TABLET ORAL at 08:03

## 2023-03-21 RX ADMIN — VANCOMYCIN HYDROCHLORIDE 1000 MG: 1 INJECTION, POWDER, LYOPHILIZED, FOR SOLUTION INTRAVENOUS at 05:03

## 2023-03-21 RX ADMIN — GLYCOPYRROLATE 1 MG: 1 TABLET ORAL at 08:03

## 2023-03-21 RX ADMIN — IPRATROPIUM BROMIDE AND ALBUTEROL SULFATE 3 ML: 2.5; .5 SOLUTION RESPIRATORY (INHALATION) at 12:03

## 2023-03-21 RX ADMIN — Medication 0.06 MCG/KG/MIN: at 05:03

## 2023-03-21 RX ADMIN — VANCOMYCIN HYDROCHLORIDE 1000 MG: 1 INJECTION, POWDER, LYOPHILIZED, FOR SOLUTION INTRAVENOUS at 03:03

## 2023-03-21 RX ADMIN — IPRATROPIUM BROMIDE AND ALBUTEROL SULFATE 3 ML: 2.5; .5 SOLUTION RESPIRATORY (INHALATION) at 04:03

## 2023-03-21 RX ADMIN — FUROSEMIDE 40 MG: 10 INJECTION, SOLUTION INTRAMUSCULAR; INTRAVENOUS at 02:03

## 2023-03-21 RX ADMIN — MELATONIN TAB 3 MG 9 MG: 3 TAB at 08:03

## 2023-03-21 RX ADMIN — GLYCOPYRROLATE 1 MG: 1 TABLET ORAL at 02:03

## 2023-03-21 RX ADMIN — FENTANYL CITRATE 50 MCG: 50 INJECTION INTRAMUSCULAR; INTRAVENOUS at 02:03

## 2023-03-21 RX ADMIN — CEFEPIME HYDROCHLORIDE 2 G: 2 INJECTION, SOLUTION INTRAVENOUS at 05:03

## 2023-03-21 RX ADMIN — HEPARIN SODIUM 15 UNITS/KG/HR: 10000 INJECTION, SOLUTION INTRAVENOUS at 10:03

## 2023-03-21 RX ADMIN — Medication 10 ML: at 12:03

## 2023-03-21 NOTE — TELEPHONE ENCOUNTER
Pt is currently admitted to ICU. Pt wife notes that he went to the ed via ambulance Saturday which is why he did not want to participate in tx last week.  He did receive his valve in the mail that he was looking fwd to. Appt canceled this week, spouse will keep up updated.

## 2023-03-21 NOTE — ASSESSMENT & PLAN NOTE
Patient with Hypoxic Respiratory failure which is Acute on chronic.  he is on home oxygen at 2 LPM. Supplemental oxygen was provided and noted- Vent Mode: PS/CPAP  Oxygen Concentration (%):  [] 100  PEEP/CPAP:  [8 cmH20] 8 cmH20  Pressure Support:  [15 cmH20] 15 cmH20  Mean Airway Pressure:  [15.7 cmH20] 15.7 cmH20.   Signs/symptoms of respiratory failure include- tachypnea and increased work of breathing. Contributing diagnoses includes - Aspiration and Pneumonia Labs and images were reviewed. Patient Has not had a recent ABG. Will treat underlying causes and adjust management of respiratory failure as follows   - changed out trach and now placed to ventilator for positive pressure  - had secretions in trach with exchange. Appears to be more comfortable now.

## 2023-03-21 NOTE — PROGRESS NOTES
Pharmacokinetic Assessment Follow Up: IV Vancomycin    Vancomycin serum concentration assessment(s):    The trough level was drawn correctly and can be used to guide therapy at this time. The measurement is within the desired definitive target range of 10 to 20 mcg/mL.    Vancomycin Regimen Plan:    Continue regimen to Vancomycin 1000 mg IV every 12 hours with next serum trough concentration measured at 0300 prior to 4th dose on 3/23/23    Drug levels (last 3 results):  Recent Labs   Lab Result Units 03/21/23  0242   Vancomycin-Trough ug/mL 16.1       Pharmacy will continue to follow and monitor vancomycin.    Please contact pharmacy at extension [phone] for questions regarding this assessment.    Thank you for the consult,   Trung Navarro       Patient brief summary:  Fareed Richard Jr. is a 73 y.o. male initiated on antimicrobial therapy with IV Vancomycin for treatment of sepsis    The patient's current regimen is Vancomycin 1000 mg q12h    Drug Allergies:   Review of patient's allergies indicates:  No Known Allergies    Actual Body Weight:   68 kg    Renal Function:   Estimated Creatinine Clearance: 89 mL/min (based on SCr of 0.7 mg/dL).,     Dialysis Method (if applicable):  N/A    CBC (last 72 hours):  Recent Labs   Lab Result Units 03/18/23  1106 03/19/23  0413 03/19/23  1445 03/20/23  0205 03/20/23  1642 03/20/23  1744 03/21/23  0242   WBC K/uL 10.64 9.90 8.74 11.20 11.86 9.89 10.11  10.11   Hemoglobin g/dL 7.6* 6.5* 7.2* 8.0* 9.2* 8.7* 7.7*  7.7*   Hematocrit % 24.5* 21.9* 22.7* 24.7* 28.7* 27.6* 25.5*  25.5*   Platelets K/uL 221 206 203 176 174 170 191  191   Gran % % 80.2* 81.1*  --  84.1* 85.2* 87.8* 79.1*  79.1*   Lymph % % 7.0* 6.3*  --  3.4* 4.6* 3.4* 7.2*  7.2*   Mono % % 9.9 11.4  --  9.4 7.3 6.7 10.7  10.7   Eosinophil % % 2.2 0.3  --  2.0 1.7 1.1 2.2  2.2   Basophil % % 0.3 0.3  --  0.4 0.3 0.2 0.2  0.2   Differential Method  Automated Automated  --  Automated Automated Automated  Automated  Automated       Metabolic Panel (last 72 hours):  Recent Labs   Lab Result Units 03/18/23  1106 03/18/23  1400 03/19/23  0413 03/20/23  0205 03/21/23  0242   Sodium mmol/L 142  --  144 143 139   Potassium mmol/L 4.6  --  4.8 4.2 3.7   Chloride mmol/L 101  --  109 110 102   CO2 mmol/L 32*  --  29 29 31*   Glucose mg/dL 137*  --  132* 146* 177*   Glucose, UA   --  Negative  --   --   --    BUN mg/dL 50*  --  44* 25* 19   Creatinine mg/dL 0.7  --  0.7 0.6 0.7   Albumin g/dL 2.9*  --  2.4*  --   --    Total Bilirubin mg/dL 0.2  --  0.4  --   --    Alkaline Phosphatase U/L 90  --  65  --   --    AST U/L 16  --  22  --   --    ALT U/L 16  --  15  --   --    Magnesium mg/dL  --   --   --  2.0 1.8       Vancomycin Administrations:  vancomycin given in the last 96 hours                     vancomycin (VANCOCIN) 1,000 mg in dextrose 5 % (D5W) 250 mL IVPB (Vial-Mate) (mg) 1,000 mg New Bag 03/21/23 0357     1,000 mg New Bag 03/20/23 1432      Restarted  0617     1,000 mg New Bag  0407    vancomycin (VANCOCIN) 1,750 mg in dextrose 5 % (D5W) 500 mL IVPB (mg) 1,750 mg New Bag 03/19/23 1446                    Microbiologic Results:  Microbiology Results (last 7 days)       Procedure Component Value Units Date/Time    Respiratory Viral Panel by PCR (Sources other than NP Swab) [[id number]] Collected: 03/20/23 1735    Order Status: Sent Specimen: Respiratory Updated: 03/20/23 1736    AFB Culture & Smear [[id number]]     Order Status: No result Specimen: Sputum     Blood culture [[id number]] Collected: 03/19/23 1307    Order Status: Completed Specimen: Blood from Antecubital, Right Hand Updated: 03/20/23 1503     Blood Culture, Routine No Growth to date      No Growth to date    Blood culture [[id number]] Collected: 03/19/23 1307    Order Status: Completed Specimen: Blood from Hand Updated: 03/20/23 1503     Blood Culture, Routine No Growth to date      No Growth to date    Culture, Respiratory with Gram Stain [[id number]]      Order Status: No result Specimen: Respiratory from Endotracheal Aspirate     Urine culture [[id number]]  (Abnormal)  (Susceptibility) Collected: 03/18/23 1400    Order Status: Completed Specimen: Urine Updated: 03/20/23 0958     Urine Culture, Routine ENTEROCOCCUS FAECALIS  >100,000 cfu/ml      Narrative:      Specimen Source->Urine

## 2023-03-21 NOTE — ASSESSMENT & PLAN NOTE
-Admitted to inpatient status  -On 3/19 developed hypoxia, hypotension, bleeding from tracheostomy / hemoptysis and severe back pain so moved to ICU  -Prior clinic notes have shown soft blood pressure - but not this low (SBP in 70s).  -Etiology unclear - concerning for hemorrhage given bleeding at trach site and anemia, but could also be sepsis due to UTI (given urine culture growing Enterococcus) or cardiogenic (given troponin bump and severe back pain)  -Lactic acid was normal.  D-dimer minimally elevated.  Troponin bumped to 2.22 and then to 3.416.  AM cortisol only 8.6.  -CTA chest showed no PE, mucoid impaction, small airway disease, and consolidation at the lung bases bilaterally, increased from prior, increased moderate left pleural effusion, emphysema with evidence of pulmonary hypertension.  -Cardiology and pulmonology consulted and input appreciated  -On 3/19 he was transfused 2 units PRBC, bolused IV fluids, started on broad spectrum antibiotics and treated with TXA nebulizers.  Pulmonology felt ct scan likely represented pneumonia.  ENT on call for system felt OK to keep at [facility].  He did not require pressors.  His blood pressure has stabilized, bleeding appears to have stopped and Hb has improved.  -Urine culture is growing E.faecalis S >100,000cfu/ml - await sensitivities.  Blood cultures negative thus far.  Continue vancomycin and cefepime for now.  -AM cortisol is low.  Will check cosyntropin stim test.  -Bump in troponin without chest pain felt secondary to demand ischemia.  Echo noted preserved EF and inferio-basilar hypokinesis.  Not a good candidate for LHC or blood thinning medications.  Cardiology recommended outpatient stress test.  -Continue in ICU for 24 hours more  -ENT consult - Dr. French attempted scope at bedside, no bleeding or other abnormalities noted.

## 2023-03-21 NOTE — PROCEDURES
"Fareed Richard Jr. is a 73 y.o. male patient.    Temp: 98 °F (36.7 °C) (03/20/23 1900)  Pulse: 97 (03/20/23 2145)  Resp: 16 (03/20/23 2145)  BP: (!) 95/57 (03/20/23 2145)  SpO2: 96 % (03/20/23 2145)  Weight: 68 kg (150 lb) (03/20/23 1130)  Height: 5' 8" (172.7 cm) (03/18/23 1747)    PICC  Date/Time: 3/20/2023 9:45 PM  Performed by: Terrell Cavanaugh RN  Consent Done: Yes  Time out: Immediately prior to procedure a time out was called to verify the correct patient, procedure, equipment, support staff and site/side marked as required  Indications: med administration and vascular access  Anesthesia: local infiltration  Local anesthetic: lidocaine 1% without epinephrine  Anesthetic Total (mL): 5  Preparation: skin prepped with ChloraPrep  Skin prep agent dried: skin prep agent completely dried prior to procedure  Sterile barriers: all five maximum sterile barriers used - cap, mask, sterile gown, sterile gloves, and large sterile sheet  Hand hygiene: hand hygiene performed prior to central venous catheter insertion  Location details: right basilic  Catheter type: triple lumen  Catheter size: 5 Fr  Catheter Length: 37cm    no (over the wire)Number of attempts: 1  Post-procedure: blood return through all ports, chlorhexidine patch and sterile dressing applied          Name terrell cavanaugh  3/20/2023    "

## 2023-03-21 NOTE — SUBJECTIVE & OBJECTIVE
Interval History:     Review of Systems   Unable to perform ROS: Acuity of condition   Objective:     Vital Signs (Most Recent):  Temp: 97.5 °F (36.4 °C) (03/21/23 0800)  Pulse: 108 (03/21/23 1100)  Resp: (!) 25 (03/21/23 1100)  BP: (!) 86/53 (03/21/23 1100)  SpO2: (!) 94 % (03/21/23 1100)   Vital Signs (24h Range):  Temp:  [97.5 °F (36.4 °C)-98.7 °F (37.1 °C)] 97.5 °F (36.4 °C)  Pulse:  [] 108  Resp:  [16-30] 25  SpO2:  [85 %-100 %] 94 %  BP: ()/(44-73) 86/53     Weight: 68 kg (150 lb)  Body mass index is 22.81 kg/m².     SpO2: (!) 94 %         Intake/Output Summary (Last 24 hours) at 3/21/2023 1124  Last data filed at 3/21/2023 1106  Gross per 24 hour   Intake 2789 ml   Output 2000 ml   Net 789 ml       Lines/Drains/Airways       Peripherally Inserted Central Catheter Line  Duration             PICC Triple Lumen 03/20/23 2145 right basilic <1 day              Drain  Duration                  Gastrostomy/Enterostomy 01/04/22 Percutaneous endoscopic gastrostomy (PEG)  days              Airway  Duration             Adult Surgical Airway 03/16/23 1014 Shiley Uncuffed 6.0 5 days              Peripheral Intravenous Line  Duration                  Peripheral IV - Single Lumen 03/20/23 1825 22 G Anterior;Right Forearm <1 day                    Physical Exam  Constitutional:       Appearance: He is well-developed.   HENT:      Head: Normocephalic and atraumatic.   Eyes:      Conjunctiva/sclera: Conjunctivae normal.      Pupils: Pupils are equal, round, and reactive to light.   Cardiovascular:      Rate and Rhythm: Normal rate.      Pulses: Intact distal pulses.      Heart sounds: Normal heart sounds.   Pulmonary:      Effort: Pulmonary effort is normal.      Breath sounds: Normal breath sounds.   Abdominal:      General: Bowel sounds are normal.      Palpations: Abdomen is soft.   Musculoskeletal:         General: Normal range of motion.      Cervical back: Normal range of motion and neck supple.    Skin:     General: Skin is warm and dry.   Neurological:      Mental Status: He is alert and oriented to person, place, and time.       Significant Labs: All pertinent lab results from the last 24 hours have been reviewed.    Significant Imaging: Echocardiogram: 2D echo with color flow doppler: No results found for this or any previous visit.

## 2023-03-21 NOTE — PROGRESS NOTES
[facility] Intensive Care  Cardiology  Progress Note    Patient Name: Fareed Richard Jr.  MRN: [id number]  Admission Date: 3/18/2023  Hospital Length of Stay: 2 days  Code Status: Full Code   Attending Physician: Luiz Patel MD   Primary Care Physician: Kodi Tubbs MD  Expected Discharge Date: 3/22/2023  Principal Problem:Hypotension    Subjective:     Hospital Course:   3/21/23 Developed increased SOB last PM. Troponin increased to 3. EKG with LBBB associated with sinus tachycardia that resolved when HR improved. Started on heparin which he has tolerated so far without further hemoptysis. Low dose tridil    Echo 3/20/23   The left ventricle is normal in size with concentric hypertrophy and normal systolic function.   The estimated ejection fraction is 60%. Inferobasal hypokinesis   Indeterminate left ventricular diastolic function.   Normal right ventricular size with normal right ventricular systolic function.   Mild left atrial enlargement.   Mild right atrial enlargement.   Normal central venous pressure (3 mmHg).   The estimated PA systolic pressure is 13 mmHg.   The sinuses of Valsalva is mildly dilated.      Interval History:     Review of Systems   Unable to perform ROS: Acuity of condition   Objective:     Vital Signs (Most Recent):  Temp: 97.5 °F (36.4 °C) (03/21/23 0800)  Pulse: 108 (03/21/23 1100)  Resp: (!) 25 (03/21/23 1100)  BP: (!) 86/53 (03/21/23 1100)  SpO2: (!) 94 % (03/21/23 1100)   Vital Signs (24h Range):  Temp:  [97.5 °F (36.4 °C)-98.7 °F (37.1 °C)] 97.5 °F (36.4 °C)  Pulse:  [] 108  Resp:  [16-30] 25  SpO2:  [85 %-100 %] 94 %  BP: ()/(44-73) 86/53     Weight: 68 kg (150 lb)  Body mass index is 22.81 kg/m².     SpO2: (!) 94 %         Intake/Output Summary (Last 24 hours) at 3/21/2023 1124  Last data filed at 3/21/2023 1106  Gross per 24 hour   Intake 2789 ml   Output 2000 ml   Net 789 ml       Lines/Drains/Airways       Peripherally Inserted Central Catheter Line   Duration             PICC Triple Lumen 03/20/23 2145 right basilic <1 day              Drain  Duration                  Gastrostomy/Enterostomy 01/04/22 Percutaneous endoscopic gastrostomy (PEG)  days              Airway  Duration             Adult Surgical Airway 03/16/23 1014 Shiley Uncuffed 6.0 5 days              Peripheral Intravenous Line  Duration                  Peripheral IV - Single Lumen 03/20/23 1825 22 G Anterior;Right Forearm <1 day                    Physical Exam  Constitutional:       Appearance: He is well-developed.   HENT:      Head: Normocephalic and atraumatic.   Eyes:      Conjunctiva/sclera: Conjunctivae normal.      Pupils: Pupils are equal, round, and reactive to light.   Cardiovascular:      Rate and Rhythm: Normal rate.      Pulses: Intact distal pulses.      Heart sounds: Normal heart sounds.   Pulmonary:      Effort: Pulmonary effort is normal.      Breath sounds: Normal breath sounds.   Abdominal:      General: Bowel sounds are normal.      Palpations: Abdomen is soft.   Musculoskeletal:         General: Normal range of motion.      Cervical back: Normal range of motion and neck supple.   Skin:     General: Skin is warm and dry.   Neurological:      Mental Status: He is alert and oriented to person, place, and time.       Significant Labs: All pertinent lab results from the last 24 hours have been reviewed.    Significant Imaging: Echocardiogram: 2D echo with color flow doppler: No results found for this or any previous visit.    Assessment and Plan:     Brief HPI:     * Hypotension  Possibly from bleeding. Pressors as needed    Hemoptysis  Per pulmonary. ASA/plavix on hold    Tracheostomy present  Per ENT    Other hyperlipidemia  On statin    Coronary artery disease involving native coronary artery of native heart without angina pectoris  Remote Hx PCI to RCA 2000. Troponin up to 2.2. Denies CP. EKG with mild NSSTT changes. Likely demand ischemia from hypotension and anemia.  Poor candidate for anticoagulation or LHC with high bleeding risk and hemoptysis. Check echo - if stable ok for out patient ischemic evaluation when able to tolerate anticoagulation    3/21/23 Troponin increased to 3. Had rate related LBBB that has since resolved. Denies CP. EF 60% on echo with inferobasal HK. Tolerating heparin so far. Will discuss LHC when cleared by pulmonary for DAPT        VTE Risk Mitigation (From admission, onward)         Ordered     heparin 25,000 units in dextrose 5% 250 ml (100 units/mL) infusion MINIMAL INTENSITY nomogram Anti- Xa  Continuous        Question Answer Comment   Heparin Infusion Adjustment (DO NOT MODIFY ANSWER) [email or web address]\[email or web address]\Images\Pharmacy\HeparinInfusions\heparin MINIMAL INTENSITY nomogram with ANTI-XA [id number]    Begin at (in units/kg/hr) 12        03/20/23 1718     IP VTE HIGH RISK PATIENT  Once         03/18/23 1620     Place sequential compression device  Until discontinued         03/18/23 1620     Place NIKITA hose  Until discontinued         03/18/23 1620                Tacos Benitez MD  Cardiology  [facility] - Intensive Care

## 2023-03-21 NOTE — ASSESSMENT & PLAN NOTE
-Hb on admit 7.6 and this morning 8.0  -Baseline Hb 8-9.  -Presented w intermittent hemoptysis via trach x 2 days. Had another episode on morning of 3/19/2023  -Seen at ENT 2 days prior to admit (Dr. Smalls) with exam without source. Trache exchanged at that time.   -Ferritin only 84 and Tsat 18% - consistent with iron deficiency  -Platelets and PT/INR are normal.  -Treated with TXA nebulizer and bleeding has stopped  -Started FeSO4 which he will need at discharge  -Repeat cbc in AM. Hb 7.7 but stable, no further evidence of bleeding.

## 2023-03-21 NOTE — SIGNIFICANT EVENT
Call to bedside due to increase wob and desat.  Patient with tachypnea and desat in high 80s despite high flow at 10 lpm.  Patient was switched to 6.0 cuffed trache.  Patient with excessive cough after trache change.  Suction and nebs given to patient.  Will start empiric steroid along diuresis.  Will place on pcv along with prn fentanyl for comfort.      Critical Care Time: 35   minutes  Critical secondary to respiratory failure     Critical care was time spent personally by me on the following activities: development of treatment plan with patient or surrogate and bedside caregivers, discussions with consultants, evaluation of patient's response to treatment, examination of patient, ordering and performing treatments and interventions, ordering and review of laboratory studies, ordering and review of radiographic studies, pulse oximetry, re-evaluation of patient's condition.  This critical care time did not overlap with that of any other provider or involve time for any procedures.

## 2023-03-21 NOTE — PLAN OF CARE
Problem: Adult Inpatient Plan of Care  Goal: Plan of Care Review  Outcome: Ongoing, Progressing  Goal: Patient-Specific Goal (Individualized)  Outcome: Ongoing, Progressing  Goal: Absence of Hospital-Acquired Illness or Injury  Outcome: Ongoing, Progressing  Goal: Optimal Comfort and Wellbeing  Outcome: Ongoing, Progressing  Goal: Readiness for Transition of Care  Outcome: Ongoing, Progressing     Problem: Fall Injury Risk  Goal: Absence of Fall and Fall-Related Injury  Outcome: Ongoing, Progressing     Problem: Skin Injury Risk Increased  Goal: Skin Health and Integrity  Outcome: Ongoing, Progressing     Problem: Infection  Goal: Absence of Infection Signs and Symptoms  Outcome: Ongoing, Progressing     Problem: Coping Ineffective  Goal: Effective Coping  Outcome: Ongoing, Progressing

## 2023-03-21 NOTE — NURSING
Attempted to titrate Nitro gtt off; pt c/o right shoulder/back pain when off; nitro gtt restarted.     Pt vomited today approximately 1 hour after feeding given. After vomiting, pt c/o right shoulder/back pain; nitro gtt titrated. Pt suctioned by PT with breathing tx given. Pt is flushed with c/o being hot and SOB, changes in VS noted as tachycardic 110-120s, increased WOB with minimal increased RR, decreased O2 sat 92-94%.   Dr. Patel notified and called to bedside. STAT CXR ordered and performed. Ventilator ordered. Trach exchanged by Dr. Mcmanus.

## 2023-03-21 NOTE — PROGRESS NOTES
[facility]  Progress Note    Patient Name: Fareed Richard Jr.  MRN: [id number]  Patient Class: IP- Inpatient   Admission Date: 3/18/2023  Length of Stay: 2 days  Attending Physician: Luiz Patel MD  Primary Care Provider: Kodi Tubbs MD        Subjective:     Principal Problem:Hypotension        HPI:  Fareed Richard Jr. 73 y.o. 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  Per his wife 4 days ago he began to have pink tinged sputum via his trach.  He was seen by his ENT 2 days ago with a scope exam and a trach exchange without evidence of bleeding.  This morning at 3:00 a.m. he developed bright red blood via trach which resolved without intervention.  It is since not recurred.  He takes a daily aspirin.  He receives all medications via G-tube.  His tube feeding regimen consists of 6 cans of Isosource daily with 120 cc free water boluses.  He denies fever chest pain shortness of breath nausea vomiting abdominal pain leg swelling syncope dizziness dysuria melena hematuria hematemesis.    In the ED, hemoglobin 7.6 INR 1.0 BUN 50 creatinine 0.7  troponin negative BRCA negative O positive type and screen chest x-ray without detrimental change hypotensive to 85/54.      Overview/Hospital Course:  No notes on file    Interval History:  Patient had episode of pain and bump in troponin.  Started on heparin drip.  Has had no further bleeding from his tracheostomy.  He did have an episode of vomiting earlier today and has somewhat been and worsening distress.  Increasing oxygen requirements and trach will be changed out today and attached to pressure support.    Objective:     Vital Signs (Most Recent):  Temp: 98.2 °F (36.8 °C) (03/21/23 1200)  Pulse: (!) 142 (03/21/23 1422)  Resp: 17.3 (03/21/23 1422)  BP: (!) 95/54 (03/21/23 1422)  SpO2: 100 % (03/21/23  1422)   Vital Signs (24h Range):  Temp:  [97.5 °F (36.4 °C)-98.7 °F (37.1 °C)] 98.2 °F (36.8 °C)  Pulse:  [] 142  Resp:  [16-30] 17.3  SpO2:  [85 %-100 %] 100 %  BP: ()/(44-73) 95/54     Weight: 68 kg (150 lb)  Body mass index is 22.81 kg/m².    Intake/Output Summary (Last 24 hours) at 3/21/2023 1432  Last data filed at 3/21/2023 1106  Gross per 24 hour   Intake 2369 ml   Output 1550 ml   Net 819 ml        Physical Exam  Vitals and nursing note reviewed.   Constitutional:       General: He is not in acute distress.     Appearance: He is well-developed. He is ill-appearing. He is not diaphoretic.      Comments: disheveled   HENT:      Head: Normocephalic and atraumatic.      Right Ear: External ear normal.      Left Ear: External ear normal.   Eyes:      General:         Right eye: No discharge.         Left eye: No discharge.      Conjunctiva/sclera: Conjunctivae normal.   Neck:      Thyroid: No thyromegaly.      Comments: Trach in place.  No evidence of bleeding now.  Cardiovascular:      Rate and Rhythm: Normal rate and regular rhythm.      Heart sounds: No murmur heard.  Pulmonary:      Effort: Pulmonary effort is normal. No respiratory distress.      Breath sounds: Normal breath sounds.   Abdominal:      General: Bowel sounds are normal. There is no distension.      Palpations: Abdomen is soft. There is no mass.      Tenderness: There is no abdominal tenderness.      Comments: Peg tube in place, abdomen somewhat distended but states it's because he ate   Musculoskeletal:         General: No deformity.      Cervical back: Normal range of motion and neck supple.   Skin:     General: Skin is warm and dry.   Neurological:      Mental Status: He is alert and oriented to person, place, and time.      Comments: Symmetric movement of BUE and BLE against gravity. Attempts to speak and communicates by writing in his notebook.  Diffusely weak   Psychiatric:      Comments: Anxious       Significant Labs: All  pertinent labs within the past 24 hours have been reviewed.    Significant Imaging: I have reviewed all pertinent imaging results/findings within the past 24 hours.    Review of Systems      Assessment/Plan:      * Hypotension  -Admitted to inpatient status  -On 3/19 developed hypoxia, hypotension, bleeding from tracheostomy / hemoptysis and severe back pain so moved to ICU  -Prior clinic notes have shown soft blood pressure - but not this low (SBP in 70s).  -Etiology unclear - concerning for hemorrhage given bleeding at trach site and anemia, but could also be sepsis due to UTI (given urine culture growing Enterococcus) or cardiogenic (given troponin bump and severe back pain)  -Lactic acid was normal.  D-dimer minimally elevated.  Troponin bumped to 2.22 and then to 3.416.  AM cortisol only 8.6.  -CTA chest showed no PE, mucoid impaction, small airway disease, and consolidation at the lung bases bilaterally, increased from prior, increased moderate left pleural effusion, emphysema with evidence of pulmonary hypertension.  -Cardiology and pulmonology consulted and input appreciated  -On 3/19 he was transfused 2 units PRBC, bolused IV fluids, started on broad spectrum antibiotics and treated with TXA nebulizers.  Pulmonology felt ct scan likely represented pneumonia.  ENT on call for system felt OK to keep at [facility].  He did not require pressors.  His blood pressure has stabilized, bleeding appears to have stopped and Hb has improved.  -Urine culture is growing E.faecalis S >100,000cfu/ml - await sensitivities.  Blood cultures negative thus far.  Continue vancomycin and cefepime for now.  -AM cortisol is low.  Will check cosyntropin stim test.  -Bump in troponin without chest pain felt secondary to demand ischemia.  Echo noted preserved EF and inferio-basilar hypokinesis.  Not a good candidate for LHC or blood thinning medications.  Cardiology recommended outpatient stress test.  -Continue in ICU for 24 hours  more  -ENT consult - Dr. French attempted scope at bedside, no bleeding or other abnormalities noted.     UTI (urinary tract infection)  -Urine culture growing Enterococcus > 100,000 cfu/ml.  Sensitive to Vanc/Ampicillin  -Continue broad spectrum antibiotics for now and tailor based off results.    Hemoptysis  -Treatment as above.    PEG (percutaneous endoscopic gastrostomy) status  -Continue medications via PEG. Does not take oral intake  -On isosource 1.5 6 days daily with 120cc free water flushes via wife  -Will continue home tube feedings, with nutrition consulted    Tracheostomy present  -Present on admit with bleeding / hemoptysis  -Treating with txa nebs and bleeding appears to have resolved  -ENT - appreciate recommendations  -Continue home glycopyrrolate and guaifensin  -Continue supplemental oxygen    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   -TSH is normal and he is not on treatment as outpatient    Acute respiratory failure with hypoxia  Patient with Hypoxic Respiratory failure which is Acute on chronic.  he is on home oxygen at 2 LPM. Supplemental oxygen was provided and noted- Vent Mode: PS/CPAP  Oxygen Concentration (%):  [] 100  PEEP/CPAP:  [8 cmH20] 8 cmH20  Pressure Support:  [15 cmH20] 15 cmH20  Mean Airway Pressure:  [15.7 cmH20] 15.7 cmH20.   Signs/symptoms of respiratory failure include- tachypnea and increased work of breathing. Contributing diagnoses includes - Aspiration and Pneumonia Labs and images were reviewed. Patient Has not had a recent ABG. Will treat underlying causes and adjust management of respiratory failure as follows   - changed out trach and now placed to ventilator for positive pressure  - had secretions in trach with exchange. Appears to be more comfortable now.    Chronic respiratory failure with hypoxia, on home O2 therapy  -Patient with acute on chronic hypoxic respiratory failure  -At home is on 5L O2 via trach and O2 sats typically in  high 80s low 90s  -Sats presently similar, but is having episodes of hemoptysis  -Acute etiology is likely due to pneumonia, mucus plugging and hemoptysis.  -Consulted pulm-critical care and input appreciated  -Discussed with ENT on call at [facility] 3/19 and OK to keep at [facility].  Dr. French to see patient today  -Continue antibiotics as above.  -Continue supplemental O2 via Trach and wean as able.     Acute blood loss anemia  -Hb on admit 7.6 and this morning 8.0  -Baseline Hb 8-9.  -Presented w intermittent hemoptysis via trach x 2 days. Had another episode on morning of 3/19/2023  -Seen at ENT 2 days prior to admit (Dr. Smalls) with exam without source. Trache exchanged at that time.   -Ferritin only 84 and Tsat 18% - consistent with iron deficiency  -Platelets and PT/INR are normal.  -Treated with TXA nebulizer and bleeding has stopped  -Started FeSO4 which he will need at discharge  -Repeat cbc in AM. Hb 7.7 but stable, no further evidence of bleeding.    Other hyperlipidemia  -Continue home statin    Primary hypertension  -BP typically in the 90s to low 100s systolic per chart review.   -Noted hypotension 3/19 which is addressed above.  -Holding home metoprolol and amlodipine    Coronary artery disease involving native coronary artery of native heart without angina pectoris  -Denies chest pain but on 3/19 did have unexplained hypotension with severe back/shoulder pain  -Troponin on admit normal, but on 3/19 bumped to 1.7 and then 2.2  -No jamil ischemic change on EKG  -Echo showed EF 60%, inferobasal hypokinesis, indeterminate diastolic function  -Cardiology consulted and input appreciated  -Cardiology suspects this is due to demand ischemia associated with his anemia and hypotension.  Further, he is a poor candidate for angiogram given his hemoptysis and inability to treat with blood thinning medications.  Recommended outpatient stress testing  -Holding aspirin given hemoptysis and metoprolol given  hypotension  -Continue statin.  Resume BB as able. On nitro drip.    Squamous cell cancer of tongue  -Diagnosed 12/15/21 via FNA.  Underwent total glossectomy, bilateral neck dissection, bilateral cervical facial flaps and anterolateral thigh flap reconstruction of glossectomy defect 1/4/22  -Completed adjuvant chemoradiation  -Followed by oncology and ENT outpt. No longer on chemo or radiation.   -Had trach changed by ENT 2 days prior to admit and scope at that time showed no signs of bleeding.   -Treatment as above.      VTE Risk Mitigation (From admission, onward)         Ordered     heparin 25,000 units in dextrose 5% 250 ml (100 units/mL) infusion MINIMAL INTENSITY nomogram Anti- Xa  Continuous        Question Answer Comment   Heparin Infusion Adjustment (DO NOT MODIFY ANSWER) \\[email or web address]\[email or web address]\Images\Pharmacy\HeparinInfusions\heparin MINIMAL INTENSITY nomogram with ANTI-XA [id number]    Begin at (in units/kg/hr) 12        03/20/23 1718     IP VTE HIGH RISK PATIENT  Once         03/18/23 1620     Place sequential compression device  Until discontinued         03/18/23 1620     Place NIKITA hose  Until discontinued         03/18/23 1620                Discharge Planning   NISA: 3/22/2023     Code Status: Full Code   Is the patient medically ready for discharge?:     Reason for patient still in hospital (select all that apply): Treatment  Discharge Plan A: Home with family            Critical care time spent on the evaluation and treatment of severe organ dysfunction, review of pertinent labs and imaging studies, discussions with consulting providers and discussions with patient/family: 25 minutes.      Luiz Patel MD  Department of Hospital Medicine   [facility] - Intensive Care

## 2023-03-21 NOTE — ASSESSMENT & PLAN NOTE
Remote Hx PCI to RCA 2000. Troponin up to 2.2. Denies CP. EKG with mild NSSTT changes. Likely demand ischemia from hypotension and anemia. Poor candidate for anticoagulation or LHC with high bleeding risk and hemoptysis. Check echo - if stable ok for out patient ischemic evaluation when able to tolerate anticoagulation    3/21/23 Troponin increased to 3. Had rate related LBBB that has since resolved. Denies CP. EF 60% on echo with inferobasal HK. Tolerating heparin so far. Will discuss LHC when cleared by pulmonary for DAPT

## 2023-03-21 NOTE — ASSESSMENT & PLAN NOTE
-Patient with acute on chronic hypoxic respiratory failure  -At home is on 5L O2 via trach and O2 sats typically in high 80s low 90s  -Sats presently similar, but is having episodes of hemoptysis  -Acute etiology is likely due to pneumonia, mucus plugging and hemoptysis.  -Consulted pulm-critical care and input appreciated  -Discussed with ENT on call at [facility] 3/19 and OK to keep at [facility].  Dr. French to see patient today  -Continue antibiotics as above.  -Continue supplemental O2 via Trach and wean as able.

## 2023-03-21 NOTE — ASSESSMENT & PLAN NOTE
-Urine culture growing Enterococcus > 100,000 cfu/ml.  Sensitive to Vanc/Ampicillin  -Continue broad spectrum antibiotics for now and tailor based off results.

## 2023-03-21 NOTE — PLAN OF CARE
CMICU DAILY GOALS       A: Awake    RASS: Goal -    Actual - RASS (Johnson Agitation-Sedation Scale): alert and calm   Restraint necessity:    B: Breathe   SBT: Not intubated   C: Coordinate A & B, analgesics/sedatives   Pain: managed    SAT: Not intubated  D: Delirium   CAM-ICU: Overall CAM-ICU: Negative  E: Early(intubated/ Progressive (non-intubated) Mobility   MOVE Screen: Fail   Activity: Activity Management: Rolling - L1  FAS: Feeding/Nutrition   Diet order: Diet/Nutrition Received: tube feeding,    T: Thrombus   DVT prophylaxis: VTE Required Core Measure: (SCDs) Sequential compression device initiated/maintained  H: HOB Elevation   Head of Bed (HOB) Positioning: HOB elevated  U: Ulcer Prophylaxis   GI: no  G: Glucose control   managed    S: Skin   Bathing/Skin Care: bath, complete, linen changed  Device Skin Pressure Protection: absorbent pad utilized/changed, pressure points protected, skin-to-device areas padded, skin-to-skin areas padded  Pressure Reduction Devices: specialty bed utilized  Pressure Reduction Techniques: frequent weight shift encouraged, heels elevated off bed, pressure points protected  Skin Protection: incontinence pads utilized, skin-to-device areas padded, skin-to-skin areas padded, tubing/devices free from skin contact, transparent dressing maintained  B: Bowel Function   no issues   I: Indwelling Catheters   Lainez necessity:     CVC necessity: Yes  D: De-escalation Antibiotics   Yes    Family/Goals of care/Code Status   Code Status: Full Code    24H Vital Sign Range  Temp:  [98 °F (36.7 °C)-98.7 °F (37.1 °C)]   Pulse:  []   Resp:  [16-30]   BP: ()/(44-80)   SpO2:  [85 %-100 %]      Shift Events   No acute events throughout shift    VS and assessment per flow sheet, patient progressing towards goals as tolerated, plan of care reviewed with  patient , all concerns addressed, will continue to monitor.    Lubna Arias

## 2023-03-21 NOTE — HOSPITAL COURSE
3/21/23 Developed increased SOB last PM. Troponin increased to 3. EKG with LBBB associated with sinus tachycardia that resolved when HR improved. Started on heparin which he has tolerated so far without further hemoptysis. Low dose tridil     3/22/23 Awake on tach collar. Denies CP. Less SOB. HCT continues to drop on heparin. No further hemoptysis. ST depression noted on telemetry. Good UOP to lasix 40 mg IV last PM. BNP 2543    3/23/23 C - EDP 13, distal LM 30%, mild LAD 90% bifurcation lesion with D1, Cx mid 80%, RCA moderate diffuse disease with long 70% and some left to right collateral. All vessels heavily calcified  Reviewed with Dr Malone - poor candidate for CABG. Will discuss staged PCI of LAD/Cx if able to tolerate DAPT without further pulmonary bleeding.    3/27: PCI prox LAD and mid LCx via R CFA.    Interval Hx: pt seen in ICU, case d/w Dr. Sargent.  Looking good today.  No cp/sob.  Possible step down later today.    Tele: SR 80s (personally reviewed and interpreted)

## 2023-03-21 NOTE — CONSULTS
Palliative Medicine Consult   3/21/2022  - Consult received; interval chart reviewed in detail; pt discussed during ICU MDT rounds   - attempted to meet with pt at ICU bedside; assessed pt; he had just experienced an episode of vomiting after attempting to discuss plan of care with Dr. Patel (primary); pt appeared very overwhelmed, fatigued by that episode; explained that I would allow KAMILAH Gordon to continue to assist in cleaning up and resting and would attempt to re-visit later in day   - later attempted re-visit to attempt to meet with pt or spouse; spouse had recently left unit and pt's condition was not appropriate for GOC/ACP discussion   - given Dr. Escoto's (primary on 3/20), recent GOC conversation with pt and wife evening of 3/20 with expressed wishes for full treatment, full code (per MDT discussions and notes) will delay additional attempts at GOC/ACP conversation for 3/22  - full palliative consult note to follow     Trupti Beck NP   Palliative Medicine

## 2023-03-21 NOTE — ASSESSMENT & PLAN NOTE
-Denies chest pain but on 3/19 did have unexplained hypotension with severe back/shoulder pain  -Troponin on admit normal, but on 3/19 bumped to 1.7 and then 2.2  -No jamil ischemic change on EKG  -Echo showed EF 60%, inferobasal hypokinesis, indeterminate diastolic function  -Cardiology consulted and input appreciated  -Cardiology suspects this is due to demand ischemia associated with his anemia and hypotension.  Further, he is a poor candidate for angiogram given his hemoptysis and inability to treat with blood thinning medications.  Recommended outpatient stress testing  -Holding aspirin given hemoptysis and metoprolol given hypotension  -Continue statin.  Resume BB as able. On nitro drip.

## 2023-03-22 PROBLEM — J96.21 ACUTE ON CHRONIC RESPIRATORY FAILURE WITH HYPOXIA: Status: ACTIVE | Noted: 2022-03-14

## 2023-03-22 LAB
ABO + RH BLD: NORMAL
ANION GAP SERPL CALC-SCNC: 7 MMOL/L (ref 8–16)
BASOPHILS # BLD AUTO: 0.01 K/UL (ref 0–0.2)
BASOPHILS NFR BLD: 0.2 % (ref 0–1.9)
BLD GP AB SCN CELLS X3 SERPL QL: NORMAL
BUN SERPL-MCNC: 24 MG/DL (ref 8–23)
CALCIUM SERPL-MCNC: 8.1 MG/DL (ref 8.7–10.5)
CHLORIDE SERPL-SCNC: 97 MMOL/L (ref 95–110)
CO2 SERPL-SCNC: 33 MMOL/L (ref 23–29)
CREAT SERPL-MCNC: 0.8 MG/DL (ref 0.5–1.4)
DIFFERENTIAL METHOD: ABNORMAL
EOSINOPHIL # BLD AUTO: 0 K/UL (ref 0–0.5)
EOSINOPHIL NFR BLD: 0 % (ref 0–8)
ERYTHROCYTE [DISTWIDTH] IN BLOOD BY AUTOMATED COUNT: 15.2 % (ref 11.5–14.5)
EST. GFR  (NO RACE VARIABLE): >60 ML/MIN/1.73 M^2
FACT X PPP CHRO-ACNC: 0.16 IU/ML (ref 0.3–0.7)
FACT X PPP CHRO-ACNC: 0.27 IU/ML (ref 0.3–0.7)
FACT X PPP CHRO-ACNC: 0.28 IU/ML (ref 0.3–0.7)
GLUCOSE SERPL-MCNC: 181 MG/DL (ref 70–110)
HCT VFR BLD AUTO: 20.9 % (ref 40–54)
HCT VFR BLD AUTO: 20.9 % (ref 40–54)
HCT VFR BLD AUTO: 23.1 % (ref 40–54)
HGB BLD-MCNC: 6.9 G/DL (ref 14–18)
HGB BLD-MCNC: 6.9 G/DL (ref 14–18)
HGB BLD-MCNC: 7.5 G/DL (ref 14–18)
IMM GRANULOCYTES # BLD AUTO: 0.02 K/UL (ref 0–0.04)
IMM GRANULOCYTES # BLD AUTO: 0.02 K/UL (ref 0–0.04)
IMM GRANULOCYTES # BLD AUTO: 0.03 K/UL (ref 0–0.04)
IMM GRANULOCYTES NFR BLD AUTO: 0.4 % (ref 0–0.5)
IMM GRANULOCYTES NFR BLD AUTO: 0.4 % (ref 0–0.5)
IMM GRANULOCYTES NFR BLD AUTO: 0.5 % (ref 0–0.5)
LYMPHOCYTES # BLD AUTO: 0.3 K/UL (ref 1–4.8)
LYMPHOCYTES NFR BLD: 4.1 % (ref 18–48)
LYMPHOCYTES NFR BLD: 5.5 % (ref 18–48)
LYMPHOCYTES NFR BLD: 5.5 % (ref 18–48)
MAGNESIUM SERPL-MCNC: 1.9 MG/DL (ref 1.6–2.6)
MCH RBC QN AUTO: 29.5 PG (ref 27–31)
MCH RBC QN AUTO: 30.1 PG (ref 27–31)
MCH RBC QN AUTO: 30.1 PG (ref 27–31)
MCHC RBC AUTO-ENTMCNC: 32.5 G/DL (ref 32–36)
MCHC RBC AUTO-ENTMCNC: 33 G/DL (ref 32–36)
MCHC RBC AUTO-ENTMCNC: 33 G/DL (ref 32–36)
MCV RBC AUTO: 91 FL (ref 82–98)
MONOCYTES # BLD AUTO: 0.1 K/UL (ref 0.3–1)
MONOCYTES NFR BLD: 2.3 % (ref 4–15)
NEUTROPHILS # BLD AUTO: 4.7 K/UL (ref 1.8–7.7)
NEUTROPHILS # BLD AUTO: 4.7 K/UL (ref 1.8–7.7)
NEUTROPHILS # BLD AUTO: 5.7 K/UL (ref 1.8–7.7)
NEUTROPHILS NFR BLD: 91.6 % (ref 38–73)
NEUTROPHILS NFR BLD: 91.6 % (ref 38–73)
NEUTROPHILS NFR BLD: 92.9 % (ref 38–73)
NRBC BLD-RTO: 0 /100 WBC
PLATELET # BLD AUTO: 163 K/UL (ref 150–450)
PLATELET # BLD AUTO: 163 K/UL (ref 150–450)
PLATELET # BLD AUTO: 199 K/UL (ref 150–450)
PMV BLD AUTO: 9.7 FL (ref 9.2–12.9)
POTASSIUM SERPL-SCNC: 4.2 MMOL/L (ref 3.5–5.1)
RBC # BLD AUTO: 2.29 M/UL (ref 4.6–6.2)
RBC # BLD AUTO: 2.29 M/UL (ref 4.6–6.2)
RBC # BLD AUTO: 2.54 M/UL (ref 4.6–6.2)
SODIUM SERPL-SCNC: 137 MMOL/L (ref 136–145)
SPECIMEN OUTDATE: NORMAL
WBC # BLD AUTO: 5.13 K/UL (ref 3.9–12.7)
WBC # BLD AUTO: 5.13 K/UL (ref 3.9–12.7)
WBC # BLD AUTO: 6.14 K/UL (ref 3.9–12.7)

## 2023-03-22 PROCEDURE — 25000003 PHARM REV CODE 250: Performed by: HOSPITALIST

## 2023-03-22 PROCEDURE — 99223 PR INITIAL HOSPITAL CARE,LEVL III: ICD-10-PCS | Mod: ,,, | Performed by: REGISTERED NURSE

## 2023-03-22 PROCEDURE — 87070 CULTURE OTHR SPECIMN AEROBIC: CPT | Performed by: NURSE PRACTITIONER

## 2023-03-22 PROCEDURE — 25000003 PHARM REV CODE 250: Performed by: NURSE PRACTITIONER

## 2023-03-22 PROCEDURE — 99291 CRITICAL CARE FIRST HOUR: CPT | Mod: ,,, | Performed by: NURSE PRACTITIONER

## 2023-03-22 PROCEDURE — 99233 SBSQ HOSP IP/OBS HIGH 50: CPT | Mod: ,,, | Performed by: INTERNAL MEDICINE

## 2023-03-22 PROCEDURE — 94003 VENT MGMT INPAT SUBQ DAY: CPT

## 2023-03-22 PROCEDURE — 85025 COMPLETE CBC W/AUTO DIFF WBC: CPT | Mod: 91 | Performed by: HOSPITALIST

## 2023-03-22 PROCEDURE — 63600175 PHARM REV CODE 636 W HCPCS: Performed by: HOSPITALIST

## 2023-03-22 PROCEDURE — 83735 ASSAY OF MAGNESIUM: CPT | Performed by: HOSPITALIST

## 2023-03-22 PROCEDURE — A4216 STERILE WATER/SALINE, 10 ML: HCPCS | Performed by: HOSPITALIST

## 2023-03-22 PROCEDURE — 99900026 HC AIRWAY MAINTENANCE (STAT)

## 2023-03-22 PROCEDURE — 94664 DEMO&/EVAL PT USE INHALER: CPT

## 2023-03-22 PROCEDURE — 87106 FUNGI IDENTIFICATION YEAST: CPT | Performed by: NURSE PRACTITIONER

## 2023-03-22 PROCEDURE — 80048 BASIC METABOLIC PNL TOTAL CA: CPT | Performed by: HOSPITALIST

## 2023-03-22 PROCEDURE — 94761 N-INVAS EAR/PLS OXIMETRY MLT: CPT

## 2023-03-22 PROCEDURE — 86900 BLOOD TYPING SEROLOGIC ABO: CPT | Performed by: INTERNAL MEDICINE

## 2023-03-22 PROCEDURE — 99231 PR SUBSEQUENT HOSPITAL CARE,LEVL I: ICD-10-PCS | Mod: ,,, | Performed by: OTOLARYNGOLOGY

## 2023-03-22 PROCEDURE — 99233 PR SUBSEQUENT HOSPITAL CARE,LEVL III: ICD-10-PCS | Mod: ,,, | Performed by: INTERNAL MEDICINE

## 2023-03-22 PROCEDURE — 85520 HEPARIN ASSAY: CPT | Performed by: HOSPITALIST

## 2023-03-22 PROCEDURE — 99291 PR CRITICAL CARE, E/M 30-74 MINUTES: ICD-10-PCS | Mod: ,,, | Performed by: NURSE PRACTITIONER

## 2023-03-22 PROCEDURE — 87077 CULTURE AEROBIC IDENTIFY: CPT | Mod: 59 | Performed by: NURSE PRACTITIONER

## 2023-03-22 PROCEDURE — 99223 1ST HOSP IP/OBS HIGH 75: CPT | Mod: ,,, | Performed by: REGISTERED NURSE

## 2023-03-22 PROCEDURE — 25000003 PHARM REV CODE 250: Performed by: STUDENT IN AN ORGANIZED HEALTH CARE EDUCATION/TRAINING PROGRAM

## 2023-03-22 PROCEDURE — 87205 SMEAR GRAM STAIN: CPT | Performed by: NURSE PRACTITIONER

## 2023-03-22 PROCEDURE — 20000000 HC ICU ROOM

## 2023-03-22 PROCEDURE — 27000221 HC OXYGEN, UP TO 24 HOURS

## 2023-03-22 PROCEDURE — 25000003 PHARM REV CODE 250: Performed by: INTERNAL MEDICINE

## 2023-03-22 PROCEDURE — 85025 COMPLETE CBC W/AUTO DIFF WBC: CPT | Performed by: INTERNAL MEDICINE

## 2023-03-22 PROCEDURE — 99900035 HC TECH TIME PER 15 MIN (STAT)

## 2023-03-22 PROCEDURE — 87186 SC STD MICRODIL/AGAR DIL: CPT | Mod: 59 | Performed by: NURSE PRACTITIONER

## 2023-03-22 PROCEDURE — 99231 SBSQ HOSP IP/OBS SF/LOW 25: CPT | Mod: ,,, | Performed by: OTOLARYNGOLOGY

## 2023-03-22 PROCEDURE — 63600175 PHARM REV CODE 636 W HCPCS: Performed by: INTERNAL MEDICINE

## 2023-03-22 PROCEDURE — 85520 HEPARIN ASSAY: CPT | Mod: 91 | Performed by: STUDENT IN AN ORGANIZED HEALTH CARE EDUCATION/TRAINING PROGRAM

## 2023-03-22 RX ORDER — CLOPIDOGREL 300 MG/1
300 TABLET, FILM COATED ORAL ONCE
Status: COMPLETED | OUTPATIENT
Start: 2023-03-22 | End: 2023-03-22

## 2023-03-22 RX ORDER — HYDROXYZINE HYDROCHLORIDE 25 MG/1
25 TABLET, FILM COATED ORAL ONCE
Status: COMPLETED | OUTPATIENT
Start: 2023-03-22 | End: 2023-03-22

## 2023-03-22 RX ORDER — PREDNISONE 50 MG/1
50 TABLET ORAL DAILY
Status: COMPLETED | OUTPATIENT
Start: 2023-03-23 | End: 2023-03-26

## 2023-03-22 RX ORDER — CLOPIDOGREL BISULFATE 75 MG/1
75 TABLET ORAL DAILY
Status: DISCONTINUED | OUTPATIENT
Start: 2023-03-23 | End: 2023-04-30 | Stop reason: HOSPADM

## 2023-03-22 RX ORDER — ASPIRIN 81 MG/1
81 TABLET ORAL DAILY
Status: DISCONTINUED | OUTPATIENT
Start: 2023-03-23 | End: 2023-03-25

## 2023-03-22 RX ORDER — FUROSEMIDE 40 MG/1
40 TABLET ORAL DAILY
Status: DISCONTINUED | OUTPATIENT
Start: 2023-03-22 | End: 2023-03-23

## 2023-03-22 RX ADMIN — FENTANYL CITRATE 50 MCG: 50 INJECTION INTRAMUSCULAR; INTRAVENOUS at 07:03

## 2023-03-22 RX ADMIN — GLYCOPYRROLATE 1 MG: 1 TABLET ORAL at 08:03

## 2023-03-22 RX ADMIN — FERROUS SULFATE TAB 325 MG (65 MG ELEMENTAL FE) 1 EACH: 325 (65 FE) TAB at 09:03

## 2023-03-22 RX ADMIN — VANCOMYCIN HYDROCHLORIDE 1000 MG: 1 INJECTION, POWDER, LYOPHILIZED, FOR SOLUTION INTRAVENOUS at 04:03

## 2023-03-22 RX ADMIN — GLYCOPYRROLATE 1 MG: 1 TABLET ORAL at 02:03

## 2023-03-22 RX ADMIN — CEFEPIME HYDROCHLORIDE 2 G: 2 INJECTION, SOLUTION INTRAVENOUS at 12:03

## 2023-03-22 RX ADMIN — Medication 10 ML: at 11:03

## 2023-03-22 RX ADMIN — HEPARIN SODIUM 19 UNITS/KG/HR: 10000 INJECTION, SOLUTION INTRAVENOUS at 10:03

## 2023-03-22 RX ADMIN — CEFEPIME HYDROCHLORIDE 2 G: 2 INJECTION, SOLUTION INTRAVENOUS at 06:03

## 2023-03-22 RX ADMIN — MELATONIN TAB 3 MG 9 MG: 3 TAB at 08:03

## 2023-03-22 RX ADMIN — CEFEPIME HYDROCHLORIDE 2 G: 2 INJECTION, SOLUTION INTRAVENOUS at 09:03

## 2023-03-22 RX ADMIN — POLYETHYLENE GLYCOL 3350 17 G: 17 POWDER, FOR SOLUTION ORAL at 09:03

## 2023-03-22 RX ADMIN — Medication 10 ML: at 06:03

## 2023-03-22 RX ADMIN — VANCOMYCIN HYDROCHLORIDE 1000 MG: 1 INJECTION, POWDER, LYOPHILIZED, FOR SOLUTION INTRAVENOUS at 03:03

## 2023-03-22 RX ADMIN — HYDROXYZINE HYDROCHLORIDE 25 MG: 25 TABLET ORAL at 03:03

## 2023-03-22 RX ADMIN — GLYCOPYRROLATE 1 MG: 1 TABLET ORAL at 09:03

## 2023-03-22 RX ADMIN — CLOPIDOGREL BISULFATE 300 MG: 300 TABLET, FILM COATED ORAL at 05:03

## 2023-03-22 RX ADMIN — METHYLPREDNISOLONE SODIUM SUCCINATE 60 MG: 40 INJECTION, POWDER, FOR SOLUTION INTRAMUSCULAR; INTRAVENOUS at 06:03

## 2023-03-22 RX ADMIN — FUROSEMIDE 40 MG: 40 TABLET ORAL at 12:03

## 2023-03-22 RX ADMIN — Medication 0.02 MCG/KG/MIN: at 02:03

## 2023-03-22 RX ADMIN — OXYCODONE HYDROCHLORIDE 5 MG: 5 TABLET ORAL at 08:03

## 2023-03-22 RX ADMIN — HYDROXYZINE HYDROCHLORIDE 25 MG: 25 TABLET ORAL at 08:03

## 2023-03-22 RX ADMIN — METHYLPREDNISOLONE SODIUM SUCCINATE 60 MG: 40 INJECTION, POWDER, FOR SOLUTION INTRAMUSCULAR; INTRAVENOUS at 11:03

## 2023-03-22 RX ADMIN — NITROGLYCERIN 30 MCG/MIN: 20 INJECTION INTRAVENOUS at 02:03

## 2023-03-22 RX ADMIN — ATORVASTATIN CALCIUM 20 MG: 10 TABLET, FILM COATED ORAL at 09:03

## 2023-03-22 RX ADMIN — Medication 10 ML: at 07:03

## 2023-03-22 NOTE — PROGRESS NOTES
[facility] Intensive Care  Cardiology  Progress Note    Patient Name: Fareed Richard Jr.  MRN: [id number]  Admission Date: 3/18/2023  Hospital Length of Stay: 3 days  Code Status: Full Code   Attending Physician: Luiz Patel MD   Primary Care Physician: Kodi Tubbs MD  Expected Discharge Date:   Principal Problem:Hypotension    Subjective:     Hospital Course:   3/21/23 Developed increased SOB last PM. Troponin increased to 3. EKG with LBBB associated with sinus tachycardia that resolved when HR improved. Started on heparin which he has tolerated so far without further hemoptysis. Low dose tridil    3/22/23 Awake on tach collar. Denies CP. Less SOB. HCT continues to drop on heparin. No further hemoptysis. ST depression noted on telemetry. Good UOP to lasix 40 mg IV last PM. BNP 2543    Echo 3/20/23   The left ventricle is normal in size with concentric hypertrophy and normal systolic function.   The estimated ejection fraction is 60%. Inferobasal hypokinesis   Indeterminate left ventricular diastolic function.   Normal right ventricular size with normal right ventricular systolic function.   Mild left atrial enlargement.   Mild right atrial enlargement.   Normal central venous pressure (3 mmHg).   The estimated PA systolic pressure is 13 mmHg.   The sinuses of Valsalva is mildly dilated.      Interval History:     Review of Systems   Unable to perform ROS: Acuity of condition   Objective:     Vital Signs (Most Recent):  Temp: 97.6 °F (36.4 °C) (03/22/23 0700)  Pulse: 89 (03/22/23 1000)  Resp: 16.9 (03/22/23 1000)  BP: 101/60 (03/22/23 1000)  SpO2: 100 % (03/22/23 1000) Vital Signs (24h Range):  Temp:  [97.5 °F (36.4 °C)-98.2 °F (36.8 °C)] 97.6 °F (36.4 °C)  Pulse:  [] 89  Resp:  [14.9-36] 16.9  SpO2:  [87 %-100 %] 100 %  BP: ()/(45-73) 101/60     Weight: 68 kg (150 lb)  Body mass index is 22.81 kg/m².     SpO2: 100 %         Intake/Output Summary (Last 24 hours) at 3/22/2023 1135  Last  data filed at 3/22/2023 0707  Gross per 24 hour   Intake 1024.79 ml   Output 1700 ml   Net -675.21 ml       Lines/Drains/Airways       Peripherally Inserted Central Catheter Line  Duration             PICC Triple Lumen 03/20/23 2145 right basilic 1 day              Drain  Duration                  Gastrostomy/Enterostomy 01/04/22 Percutaneous endoscopic gastrostomy (PEG)  days              Airway  Duration             Adult Surgical Airway 03/16/23 1014 Shiley Uncuffed 6.0 6 days              Peripheral Intravenous Line  Duration                  Peripheral IV - Single Lumen 03/20/23 1825 22 G Anterior;Right Forearm 1 day                    Physical Exam  Constitutional:       Appearance: He is well-developed.   HENT:      Head: Normocephalic and atraumatic.   Eyes:      Conjunctiva/sclera: Conjunctivae normal.      Pupils: Pupils are equal, round, and reactive to light.   Cardiovascular:      Rate and Rhythm: Normal rate.      Pulses: Intact distal pulses.      Heart sounds: Normal heart sounds.   Pulmonary:      Effort: Pulmonary effort is normal.      Breath sounds: Normal breath sounds.   Abdominal:      General: Bowel sounds are normal.      Palpations: Abdomen is soft.   Musculoskeletal:         General: Normal range of motion.      Cervical back: Normal range of motion and neck supple.   Skin:     General: Skin is warm and dry.   Neurological:      Mental Status: He is alert and oriented to person, place, and time.       Significant Labs: All pertinent lab results from the last 24 hours have been reviewed.    Significant Imaging: Echocardiogram: 2D echo with color flow doppler: No results found for this or any previous visit.    Assessment and Plan:     Brief HPI:     * Hypotension  Possibly from bleeding. Pressors as needed    Hemoptysis  Per pulmonary. ASA/plavix on hold    Elevated brain natriuretic peptide (BNP) level  CXR with some volume overload. BNP 2543. Will start scheduled po lasix as  tolerated by BP    Tracheostomy present  Per ENT    Other hyperlipidemia  On statin    Coronary artery disease involving native coronary artery of native heart without angina pectoris  Remote Hx PCI to RCA 2000. Troponin up to 2.2. Denies CP. EKG with mild NSSTT changes. Likely demand ischemia from hypotension and anemia. Poor candidate for anticoagulation or LHC with high bleeding risk and hemoptysis. Check echo - if stable ok for out patient ischemic evaluation when able to tolerate anticoagulation    3/21/23 Troponin increased to 3. Had rate related LBBB that has since resolved. Denies CP. EF 60% on echo with inferobasal HK. Tolerating heparin so far. Will discuss LHC when cleared by pulmonary for DAPT        VTE Risk Mitigation (From admission, onward)         Ordered     heparin 25,000 units in dextrose 5% 250 ml (100 units/mL) infusion MINIMAL INTENSITY nomogram Anti- Xa  Continuous        Question Answer Comment   Heparin Infusion Adjustment (DO NOT MODIFY ANSWER) [email or web address]\Images\Pharmacy\HeparinInfusions\heparin MINIMAL INTENSITY nomogram with ANTI-XA [id number]    Begin at (in units/kg/hr) 12        03/20/23 1718     IP VTE HIGH RISK PATIENT  Once         03/18/23 1620     Place sequential compression device  Until discontinued         03/18/23 1620     Place NIKITA hose  Until discontinued         03/18/23 1620                Tacos Benitez MD  Cardiology  [facility] - Intensive Care

## 2023-03-22 NOTE — ASSESSMENT & PLAN NOTE
Likely secondary to lower respiratory tract infection in the setting of ASA/Plavix.  It seems less likely to be physical complication of the tracheostomy tube itself.    · Antibiotics for pneumonia.  · Check sputum for culture, including AFB for possible MAC infection.  · Respiratory viral panel.  · OK to resume anti-platelet medications; monitor for bleeding   · No need for additional nebulized TXA   · Appreciate ENT evaluation

## 2023-03-22 NOTE — CARE UPDATE
[facility]  ICU Shift Summary  Date: 3/22/2023      Prehospitalization: Home  Admit Date / LOS : 3/18/2023/ 3 days    Diagnosis: Hypotension    Consults:        Active: Palliative, Pulm CC, and RD       Needed: N/A     Code Status: Full Code   Advanced Directive: Not Received    LDA:  Lines/Drains/Airways       Peripherally Inserted Central Catheter Line  Duration             PICC Triple Lumen 03/20/23 2145 right basilic 1 day              Drain  Duration                  Gastrostomy/Enterostomy 01/04/22 Percutaneous endoscopic gastrostomy (PEG)  days              Airway  Duration             Adult Surgical Airway 03/16/23 1014 Shiley Uncuffed 6.0 5 days              Peripheral Intravenous Line  Duration                  Peripheral IV - Single Lumen 03/20/23 1825 22 G Anterior;Right Forearm 1 day                  Central Lines/Site/Justification:Multiple GTTS  Urinary Cath/Order/Justification:Patient Does Not Have Urinary Catheter    Vasopressors/Infusions:    fentanyl 12.5 mcg/hr (03/22/23 0600)    heparin (porcine) in D5W 17 Units/kg/hr (03/22/23 0600)    nitroGLYCERIN 30 mcg/min (03/22/23 0600)    NORepinephrine bitartrate-D5W Stopped (03/22/23 0627)          GOALS: Volume/ Hemodynamic: MAP >65                     RASS: 0  alert and calm    Pain Management: IV       Pain Controlled: yes     Rhythm: NSR and ST    Respiratory Device: Vent    Vent Mode: PC  Oxygen Concentration (%):  [] 50  Resp Rate Total:  [21 br/min-26 br/min] 22 br/min  Vt Set:  [370 mL] 370 mL  PEEP/CPAP:  [8 cmH20] 8 cmH20  Pressure Support:  [15 cmH20] 15 cmH20  Mean Airway Pressure:  [10.7 ulA30-25.4 cmH20] 10.7 cmH20             Most Recent SBT/ SAT: N/A, pt with long term trach, is alert and moving around       MOVE Screen: PASS  ICU Liberation: not applicable    VTE Prophylaxis: Pharm  Mobility: Bedrest  Stress Ulcer Prophylaxis: Yes    Isolation: No active isolations    Dietary:   Current Diet Order   No  orders of the defined types were placed in this encounter.      Tolerance: no  Advancement: no    I & O (24h):    Intake/Output Summary (Last 24 hours) at 3/22/2023 0720  Last data filed at 3/22/2023 0600  Gross per 24 hour   Intake 1561.28 ml   Output 1825 ml   Net -263.72 ml        Restraints: No    Significant Dates:  Post Op Date:  N/A  Rescue Date: N/A  Imaging/ Diagnostics: N/A    Noteworthy Labs:  none    COVID Test: (--)  CBC/Anemia Labs: Coags:    Recent Labs   Lab 03/18/23  1106 03/18/23  1410 03/19/23  0413 03/22/23  0233 03/22/23  0450   WBC 10.64  --    < > 5.13  5.13 6.14   HGB 7.6*  --    < > 6.9*  6.9* 7.5*   HCT 24.5*  --    < > 20.9*  20.9* 23.1*     --    < > 163  163 199   MCV 91  --    < > 91  91 91   RDW 14.0  --    < > 15.2*  15.2* 15.2*   IRON  --  57  --   --   --    FERRITIN 84  --   --   --   --    FOLATE  --  16.5  --   --   --    VOEELHQI18  --  1079*  --   --   --     < > = values in this interval not displayed.    Recent Labs   Lab 03/18/23  1106 03/20/23  1744   INR 1.0 1.0        Chemistries:   Recent Labs   Lab 03/18/23  1106 03/19/23  0413 03/20/23  0205 03/21/23  0242 03/22/23  0233    144   < > 139 137   K 4.6 4.8   < > 3.7 4.2    109   < > 102 97   CO2 32* 29   < > 31* 33*   BUN 50* 44*   < > 19 24*   CREATININE 0.7 0.7   < > 0.7 0.8   CALCIUM 9.2 8.2*   < > 8.3* 8.1*   PROT 6.7 5.5*  --   --   --    BILITOT 0.2 0.4  --   --   --    ALKPHOS 90 65  --   --   --    ALT 16 15  --   --   --    AST 16 22  --   --   --    MG  --   --    < > 1.8 1.9    < > = values in this interval not displayed.        Cardiac Enzymes: Ejection Fractions:    Recent Labs     03/19/23  1840 03/20/23  1642   TROPONINI 2.222* 3.416*    EF   Date Value Ref Range Status   03/20/2023 60 % Final        POCT Glucose: HbA1c:    No results for input(s): POCTGLUCOSE in the last 168 hours. Hemoglobin A1C   Date Value Ref Range Status   09/27/2022 6.3 (H) 4.0 - 5.6 % Final     Comment:      ADA Screening Guidelines:  5.7-6.4%  Consistent with prediabetes  >or=6.5%  Consistent with diabetes    High levels of fetal hemoglobin interfere with the HbA1C  assay. Heterozygous hemoglobin variants (HbS, HgC, etc)do  not significantly interfere with this assay.   However, presence of multiple variants may affect accuracy.             ICU LOS 2d 18h  Level of Care: Critical Care    Chart Check: 12 HR Done  Shift Summary/Plan for the shift: BP dropped while patient sleeping, restarted Levo for a few hours (0.02-0.04) to maintain MAP >65, off again this morning when pt awake. Rested comfortably overnight, sats near 100% throughout shift on vent 50%. Pain well controlled on 12.5 mcg continuous fentanyl. Heparin gtt continued, most recent Anti Xa drawn at 0630 for send out.

## 2023-03-22 NOTE — SUBJECTIVE & OBJECTIVE
Interval History: Vomiting yesterday morning with respiratory distress noted after; likely aspirated. Trach changed and started on mechanical ventilation. No further bleeding noted; hemoglobin remains stable on heparin infusion. Remains on nitro infusion at 30mcg/min with recurrent pain with attempts to wean. Tolerating PSV and will attempt to transition back to trach collar.     Objective:     Vital Signs (Most Recent):  Temp: 97.6 °F (36.4 °C) (03/22/23 0700)  Pulse: 84 (03/22/23 1430)  Resp: 17.8 (03/22/23 1430)  BP: (!) 83/51 (03/22/23 1430)  SpO2: 100 % (03/22/23 1430)   Vital Signs (24h Range):  Temp:  [97.5 °F (36.4 °C)-97.8 °F (36.6 °C)] 97.6 °F (36.4 °C)  Pulse:  [] 84  Resp:  [14.9-33] 17.8  SpO2:  [87 %-100 %] 100 %  BP: ()/(45-73) 83/51     Weight: 68 kg (150 lb)  Body mass index is 22.81 kg/m².      Intake/Output Summary (Last 24 hours) at 3/22/2023 1442  Last data filed at 3/22/2023 0707  Gross per 24 hour   Intake 1024.79 ml   Output 1575 ml   Net -550.21 ml       Physical Exam  Vitals and nursing note reviewed.   Constitutional:       General: He is not in acute distress.     Appearance: He is ill-appearing. He is not toxic-appearing.   Neck:      Comments: Tracheostomy in place.  Cardiovascular:      Rate and Rhythm: Normal rate and regular rhythm.      Heart sounds: Normal heart sounds. No murmur heard.    No gallop.   Pulmonary:      Effort: Pulmonary effort is normal.      Breath sounds: No wheezing, rhonchi or rales.   Abdominal:      General: There is no distension.      Palpations: Abdomen is soft.      Tenderness: There is no abdominal tenderness.   Musculoskeletal:      Right lower leg: No edema.      Left lower leg: No edema.   Neurological:      Mental Status: He is alert and oriented to person, place, and time. Mental status is at baseline.   Psychiatric:         Mood and Affect: Mood normal.         Thought Content: Thought content normal.     Review of Systems   Respiratory:   Negative for shortness of breath.    Cardiovascular:  Negative for chest pain.   Gastrointestinal:  Negative for abdominal distention, abdominal pain, blood in stool and constipation.   Genitourinary:  Negative for difficulty urinating.   Psychiatric/Behavioral:  The patient is nervous/anxious.      Vents:  Vent Mode: PS/CPAP (03/22/23 1141)  Set Rate: 12 BPM (03/22/23 0343)  Vt Set: 370 mL (03/21/23 1615)  Pressure Support: 5 cmH20 (03/22/23 1141)  PEEP/CPAP: 8 cmH20 (03/22/23 1141)  Oxygen Concentration (%): 40 (03/22/23 1430)  Peak Airway Pressure: 13.5 cmH20 (03/22/23 1141)  Total Ve: 9.42 L/m (03/22/23 1141)  F/VT Ratio<105 (RSBI): (!) 42.25 (03/22/23 1141)    Lines/Drains/Airways       Peripherally Inserted Central Catheter Line  Duration             PICC Triple Lumen 03/20/23 2145 right basilic 1 day              Drain  Duration                  Gastrostomy/Enterostomy 01/04/22 Percutaneous endoscopic gastrostomy (PEG)  days              Airway  Duration             Adult Surgical Airway 03/16/23 1014 Shiley Uncuffed 6.0 6 days              Peripheral Intravenous Line  Duration                  Peripheral IV - Single Lumen 03/20/23 1825 22 G Anterior;Right Forearm 1 day                    Significant Labs:    CBC/Anemia Profile:  Recent Labs   Lab 03/21/23  0242 03/22/23  0233 03/22/23  0450   WBC 10.11  10.11 5.13  5.13 6.14   HGB 7.7*  7.7* 6.9*  6.9* 7.5*   HCT 25.5*  25.5* 20.9*  20.9* 23.1*     191 163  163 199   MCV 93  93 91  91 91   RDW 15.9*  15.9* 15.2*  15.2* 15.2*        Chemistries:  Recent Labs   Lab 03/21/23  0242 03/22/23  0233    137   K 3.7 4.2    97   CO2 31* 33*   BUN 19 24*   CREATININE 0.7 0.8   CALCIUM 8.3* 8.1*   MG 1.8 1.9       All pertinent labs within the past 24 hours have been reviewed.    Significant Imaging:  I have reviewed all pertinent imaging results/findings within the past 24 hours.

## 2023-03-22 NOTE — SUBJECTIVE & OBJECTIVE
Interval History:  feeling better today, says he has not eaten enough to have a bowel movement. On 30 of nitro and states when we try to decrease his pain reoccurs.    Objective:     Vital Signs (Most Recent):  Temp: 97.6 °F (36.4 °C) (03/22/23 0700)  Pulse: 91 (03/22/23 1545)  Resp: (!) 22 (03/22/23 1545)  BP: (!) 97/59 (03/22/23 1545)  SpO2: 98 % (03/22/23 1545)   Vital Signs (24h Range):  Temp:  [97.5 °F (36.4 °C)-97.6 °F (36.4 °C)] 97.6 °F (36.4 °C)  Pulse:  [] 91  Resp:  [14.9-33] 22  SpO2:  [91 %-100 %] 98 %  BP: ()/(45-73) 97/59     Weight: 68 kg (150 lb)  Body mass index is 22.81 kg/m².    Intake/Output Summary (Last 24 hours) at 3/22/2023 1628  Last data filed at 3/22/2023 1500  Gross per 24 hour   Intake 1824.79 ml   Output 1450 ml   Net 374.79 ml        Physical Exam  Vitals and nursing note reviewed.   Constitutional:       General: He is not in acute distress.     Appearance: He is well-developed. He is ill-appearing. He is not diaphoretic.      Comments: disheveled   HENT:      Head: Normocephalic and atraumatic.      Right Ear: External ear normal.      Left Ear: External ear normal.   Eyes:      General:         Right eye: No discharge.         Left eye: No discharge.      Conjunctiva/sclera: Conjunctivae normal.   Neck:      Thyroid: No thyromegaly.      Comments: Trach in place.  No evidence of bleeding now.  Cardiovascular:      Rate and Rhythm: Normal rate and regular rhythm.      Heart sounds: No murmur heard.  Pulmonary:      Effort: Pulmonary effort is normal. No respiratory distress.      Breath sounds: Normal breath sounds.      Comments: On vent.  Abdominal:      General: Bowel sounds are normal. There is no distension.      Palpations: Abdomen is soft. There is no mass.      Tenderness: There is no abdominal tenderness.      Comments: Peg tube in place   Musculoskeletal:         General: No deformity.      Cervical back: Normal range of motion and neck supple.   Skin:      General: Skin is warm and dry.   Neurological:      Mental Status: He is alert and oriented to person, place, and time.      Comments: Symmetric movement of BUE and BLE against gravity. Attempts to speak and communicates by writing in his notebook.  Diffusely weak   Psychiatric:      Comments: Anxious       Significant Labs: All pertinent labs within the past 24 hours have been reviewed.    Significant Imaging: I have reviewed all pertinent imaging results/findings within the past 24 hours.    Review of Systems

## 2023-03-22 NOTE — SUBJECTIVE & OBJECTIVE
Interval History:     Review of Systems   Unable to perform ROS: Acuity of condition   Objective:     Vital Signs (Most Recent):  Temp: 97.6 °F (36.4 °C) (03/22/23 0700)  Pulse: 89 (03/22/23 1000)  Resp: 16.9 (03/22/23 1000)  BP: 101/60 (03/22/23 1000)  SpO2: 100 % (03/22/23 1000) Vital Signs (24h Range):  Temp:  [97.5 °F (36.4 °C)-98.2 °F (36.8 °C)] 97.6 °F (36.4 °C)  Pulse:  [] 89  Resp:  [14.9-36] 16.9  SpO2:  [87 %-100 %] 100 %  BP: ()/(45-73) 101/60     Weight: 68 kg (150 lb)  Body mass index is 22.81 kg/m².     SpO2: 100 %         Intake/Output Summary (Last 24 hours) at 3/22/2023 1135  Last data filed at 3/22/2023 0707  Gross per 24 hour   Intake 1024.79 ml   Output 1700 ml   Net -675.21 ml       Lines/Drains/Airways       Peripherally Inserted Central Catheter Line  Duration             PICC Triple Lumen 03/20/23 2145 right basilic 1 day              Drain  Duration                  Gastrostomy/Enterostomy 01/04/22 Percutaneous endoscopic gastrostomy (PEG)  days              Airway  Duration             Adult Surgical Airway 03/16/23 1014 Shiley Uncuffed 6.0 6 days              Peripheral Intravenous Line  Duration                  Peripheral IV - Single Lumen 03/20/23 1825 22 G Anterior;Right Forearm 1 day                    Physical Exam  Constitutional:       Appearance: He is well-developed.   HENT:      Head: Normocephalic and atraumatic.   Eyes:      Conjunctiva/sclera: Conjunctivae normal.      Pupils: Pupils are equal, round, and reactive to light.   Cardiovascular:      Rate and Rhythm: Normal rate.      Pulses: Intact distal pulses.      Heart sounds: Normal heart sounds.   Pulmonary:      Effort: Pulmonary effort is normal.      Breath sounds: Normal breath sounds.   Abdominal:      General: Bowel sounds are normal.      Palpations: Abdomen is soft.   Musculoskeletal:         General: Normal range of motion.      Cervical back: Normal range of motion and neck supple.   Skin:      General: Skin is warm and dry.   Neurological:      Mental Status: He is alert and oriented to person, place, and time.       Significant Labs: All pertinent lab results from the last 24 hours have been reviewed.    Significant Imaging: Echocardiogram: 2D echo with color flow doppler: No results found for this or any previous visit.

## 2023-03-22 NOTE — PROGRESS NOTES
Vancomycin consult follow-up:    Patient reviewed, renal function stable, no new levels, continue current therapy; Next levels due: trough due 3/23/2023 at 0300

## 2023-03-22 NOTE — NURSING
[facility]  [place] -- 4 [place]      3/21/2023       Skin assessed on: Q Shift      [x] No Pressure Injuries Present    [x]Prevention Measures Documented    [] Yes LDA  for Pressure Injury Previously documented     [] Yes New Pressure Injury Discovered   [] LDA for New Pressure Injury Added      Attending RN:  Heidi Moreira RN     Second RN:  KAMILAH Wolff

## 2023-03-22 NOTE — PROGRESS NOTES
[facility]  Pulmonology  Progress Note    Patient Name: Fareed Richard Jr.  MRN: [id number]  Admission Date: 3/18/2023  Hospital Length of Stay: 3 days  Code Status: Full Code  Attending Provider: Luiz Patel MD  Primary Care Provider: Kodi Tubbs MD   Principal Problem: Hypotension    Subjective:     Interval History: Vomiting yesterday morning with respiratory distress noted after; likely aspirated. Trach changed and started on mechanical ventilation. No further bleeding noted; hemoglobin remains stable on heparin infusion. Remains on nitro infusion at 30mcg/min with recurrent pain with attempts to wean. Tolerating PSV and will attempt to transition back to trach collar.     Objective:     Vital Signs (Most Recent):  Temp: 97.6 °F (36.4 °C) (03/22/23 0700)  Pulse: 84 (03/22/23 1430)  Resp: 17.8 (03/22/23 1430)  BP: (!) 83/51 (03/22/23 1430)  SpO2: 100 % (03/22/23 1430)   Vital Signs (24h Range):  Temp:  [97.5 °F (36.4 °C)-97.8 °F (36.6 °C)] 97.6 °F (36.4 °C)  Pulse:  [] 84  Resp:  [14.9-33] 17.8  SpO2:  [87 %-100 %] 100 %  BP: ()/(45-73) 83/51     Weight: 68 kg (150 lb)  Body mass index is 22.81 kg/m².      Intake/Output Summary (Last 24 hours) at 3/22/2023 1442  Last data filed at 3/22/2023 0707  Gross per 24 hour   Intake 1024.79 ml   Output 1575 ml   Net -550.21 ml       Physical Exam  Vitals and nursing note reviewed.   Constitutional:       General: He is not in acute distress.     Appearance: He is ill-appearing. He is not toxic-appearing.   Neck:      Comments: Tracheostomy in place.  Cardiovascular:      Rate and Rhythm: Normal rate and regular rhythm.      Heart sounds: Normal heart sounds. No murmur heard.    No gallop.   Pulmonary:      Effort: Pulmonary effort is normal.      Breath sounds: No wheezing, rhonchi or rales.   Abdominal:      General: There is no distension.      Palpations: Abdomen is soft.      Tenderness: There is no abdominal tenderness.    Musculoskeletal:      Right lower leg: No edema.      Left lower leg: No edema.   Neurological:      Mental Status: He is alert and oriented to person, place, and time. Mental status is at baseline.   Psychiatric:         Mood and Affect: Mood normal.         Thought Content: Thought content normal.     Review of Systems   Respiratory:  Negative for shortness of breath.    Cardiovascular:  Negative for chest pain.   Gastrointestinal:  Negative for abdominal distention, abdominal pain, blood in stool and constipation.   Genitourinary:  Negative for difficulty urinating.   Psychiatric/Behavioral:  The patient is nervous/anxious.      Vents:  Vent Mode: PS/CPAP (03/22/23 1141)  Set Rate: 12 BPM (03/22/23 0343)  Vt Set: 370 mL (03/21/23 1615)  Pressure Support: 5 cmH20 (03/22/23 1141)  PEEP/CPAP: 8 cmH20 (03/22/23 1141)  Oxygen Concentration (%): 40 (03/22/23 1430)  Peak Airway Pressure: 13.5 cmH20 (03/22/23 1141)  Total Ve: 9.42 L/m (03/22/23 1141)  F/VT Ratio<105 (RSBI): (!) 42.25 (03/22/23 1141)    Lines/Drains/Airways       Peripherally Inserted Central Catheter Line  Duration             PICC Triple Lumen 03/20/23 2145 right basilic 1 day              Drain  Duration                  Gastrostomy/Enterostomy 01/04/22 Percutaneous endoscopic gastrostomy (PEG)  days              Airway  Duration             Adult Surgical Airway 03/16/23 1014 Shiley Uncuffed 6.0 6 days              Peripheral Intravenous Line  Duration                  Peripheral IV - Single Lumen 03/20/23 1825 22 G Anterior;Right Forearm 1 day                    Significant Labs:    CBC/Anemia Profile:  Recent Labs   Lab 03/21/23  0242 03/22/23  0233 03/22/23  0450   WBC 10.11  10.11 5.13  5.13 6.14   HGB 7.7*  7.7* 6.9*  6.9* 7.5*   HCT 25.5*  25.5* 20.9*  20.9* 23.1*     191 163  163 199   MCV 93  93 91  91 91   RDW 15.9*  15.9* 15.2*  15.2* 15.2*        Chemistries:  Recent Labs   Lab 03/21/23  0242 03/22/23  0233   NA  139 137   K 3.7 4.2    97   CO2 31* 33*   BUN 19 24*   CREATININE 0.7 0.8   CALCIUM 8.3* 8.1*   MG 1.8 1.9       All pertinent labs within the past 24 hours have been reviewed.    Significant Imaging:  I have reviewed all pertinent imaging results/findings within the past 24 hours.      ABG  No results for input(s): PH, PO2, PCO2, HCO3, BE in the last 168 hours.  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    · Undergoing swallowing evaluation/training as per Speech Therapy    Pulmonary  Hemoptysis  Likely secondary to lower respiratory tract infection in the setting of ASA/Plavix.  It seems less likely to be physical complication of the tracheostomy tube itself.    · Antibiotics for pneumonia.  · Check sputum for culture, including AFB for possible MAC infection.  · Respiratory viral panel.  · OK to resume anti-platelet medications; monitor for bleeding   · No need for additional nebulized TXA   · Appreciate ENT evaluation     Acute on chronic respiratory failure with hypoxia  Patient with Hypoxic Respiratory failure which is Acute on chronic.  he is on home oxygen at 2-3 LPM. Supplemental oxygen was provided and noted- Vent Mode: PS/CPAP  Oxygen Concentration (%):  [40-50] 40  Resp Rate Total:  [21 br/min-26 br/min] 22 br/min  Vt Set:  [370 mL] 370 mL  PEEP/CPAP:  [8 cmH20] 8 cmH20  Pressure Support:  [5 cmH20] 5 cmH20  Mean Airway Pressure:  [10.1 dkO35-04.4 cmH20] 10.3 cmH20.   Signs/symptoms of respiratory failure include- increased work of breathing and hemoptysis. Contributing diagnoses includes - Aspiration and Pneumonia Labs and images were reviewed. Patient Has not had a recent ABG. Will treat underlying causes and adjust management of respiratory failure as follows-     Hemoptysis with  increased bilateral infiltrates on chest CTA in the setting of likely aspiration (attempting swallow training with Speech) and chronic aspirin/Plavix.  Although procalcitonin is normal, imaging and history warrant treatment with antibiotics for presumed nosocomial pneumonia.  Moved to ICU due to labile BP and increased oxygen requirements.  After receiving one unit of packed red blood cells, he looks much better and BP is improved.    · Treat for pneumonia (nosocomial pathogens)  · Nebulized bronchodilators given COPD/emphysema changes  · Trach changed to 6.0 cuffed shiley on 3/22 to facilitate ventilatory support after likely aspiration event  · Sputum ordered, follow up results  · Titrate oxygen as needed; wean back to trach collar as tolerated  · No further bleeding noted, no need to continue TXA nebulizer treatments for hemoptysis. Hgb remains stable on heparin gtt.   · OK to restart DAPT from pulmonary standpoint     Renal/  UTI (urinary tract infection)  Enterococcus in the urine.    - continue vanc     Plan discussed with Dr. Mcmanus and Dr. Patel.     Critical Care Time:  50 minutes  Critical care was time spent personally by me on the following activities: development of treatment plan with patient or surrogate and bedside caregivers, discussions with consultants, evaluation of patient's response to treatment, examination of patient, ordering and performing treatments and interventions, ordering and review of laboratory studies, ordering and review of radiographic studies, pulse oximetry, re-evaluation of patient's condition. This critical care time did not overlap with that of any other provider or involve time for any procedures.     Alicia Schofield, NP  Pulmonology  [facility] - Intensive Care

## 2023-03-22 NOTE — PLAN OF CARE
03/22/23 1328   Discharge Reassessment   Assessment Type Discharge Planning Reassessment   Why the patient remains in the hospital Requires continued medical care     Palliative team seeing patient and plan to reach out to the wife.  Waiting for their recommendations.  Barriers to DC:  None   Discharge plan at this time:  TBD    CM will continue to follow and finalize plans

## 2023-03-22 NOTE — PROGRESS NOTES
[facility] Medicine  Progress Note    Patient Name: Fareed Richard Jr.  MRN: [id number]  Patient Class: IP- Inpatient   Admission Date: 3/18/2023  Length of Stay: 3 days  Attending Physician: Luiz Patel MD  Primary Care Provider: Kodi Tubbs MD        Subjective:     Principal Problem:Hypotension        HPI:  Fareed Richard Jr. 73 y.o. 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  Per his wife 4 days ago he began to have pink tinged sputum via his trach.  He was seen by his ENT 2 days ago with a scope exam and a trach exchange without evidence of bleeding.  This morning at 3:00 a.m. he developed bright red blood via trach which resolved without intervention.  It is since not recurred.  He takes a daily aspirin.  He receives all medications via G-tube.  His tube feeding regimen consists of 6 cans of Isosource daily with 120 cc free water boluses.  He denies fever chest pain shortness of breath nausea vomiting abdominal pain leg swelling syncope dizziness dysuria melena hematuria hematemesis.    In the ED, hemoglobin 7.6 INR 1.0 BUN 50 creatinine 0.7  troponin negative BRCA negative O positive type and screen chest x-ray without detrimental change hypotensive to 85/54.      Overview/Hospital Course:  No notes on file    Interval History:  feeling better today, says he has not eaten enough to have a bowel movement. On 30 of nitro and states when we try to decrease his pain reoccurs.    Objective:     Vital Signs (Most Recent):  Temp: 97.6 °F (36.4 °C) (03/22/23 0700)  Pulse: 91 (03/22/23 1545)  Resp: (!) 22 (03/22/23 1545)  BP: (!) 97/59 (03/22/23 1545)  SpO2: 98 % (03/22/23 1545)   Vital Signs (24h Range):  Temp:  [97.5 °F (36.4 °C)-97.6 °F (36.4 °C)] 97.6 °F (36.4 °C)  Pulse:  [] 91  Resp:  [14.9-33] 22  SpO2:  [91 %-100 %] 98 %  BP: ()/(45-73)  97/59     Weight: 68 kg (150 lb)  Body mass index is 22.81 kg/m².    Intake/Output Summary (Last 24 hours) at 3/22/2023 1628  Last data filed at 3/22/2023 1500  Gross per 24 hour   Intake 1824.79 ml   Output 1450 ml   Net 374.79 ml        Physical Exam  Vitals and nursing note reviewed.   Constitutional:       General: He is not in acute distress.     Appearance: He is well-developed. He is ill-appearing. He is not diaphoretic.      Comments: disheveled   HENT:      Head: Normocephalic and atraumatic.      Right Ear: External ear normal.      Left Ear: External ear normal.   Eyes:      General:         Right eye: No discharge.         Left eye: No discharge.      Conjunctiva/sclera: Conjunctivae normal.   Neck:      Thyroid: No thyromegaly.      Comments: Trach in place.  No evidence of bleeding now.  Cardiovascular:      Rate and Rhythm: Normal rate and regular rhythm.      Heart sounds: No murmur heard.  Pulmonary:      Effort: Pulmonary effort is normal. No respiratory distress.      Breath sounds: Normal breath sounds.      Comments: On vent.  Abdominal:      General: Bowel sounds are normal. There is no distension.      Palpations: Abdomen is soft. There is no mass.      Tenderness: There is no abdominal tenderness.      Comments: Peg tube in place   Musculoskeletal:         General: No deformity.      Cervical back: Normal range of motion and neck supple.   Skin:     General: Skin is warm and dry.   Neurological:      Mental Status: He is alert and oriented to person, place, and time.      Comments: Symmetric movement of BUE and BLE against gravity. Attempts to speak and communicates by writing in his notebook.  Diffusely weak   Psychiatric:      Comments: Anxious       Significant Labs: All pertinent labs within the past 24 hours have been reviewed.    Significant Imaging: I have reviewed all pertinent imaging results/findings within the past 24 hours.    Review of Systems      Assessment/Plan:      *  Hypotension  -Admitted to inpatient status  -On 3/19 developed hypoxia, hypotension, bleeding from tracheostomy / hemoptysis and severe back pain so moved to ICU  -Prior clinic notes have shown soft blood pressure - but not this low (SBP in 70s).  -Etiology unclear - concerning for hemorrhage given bleeding at trach site and anemia, but could also be sepsis due to UTI (given urine culture growing Enterococcus) or cardiogenic (given troponin bump and severe back pain)  -Lactic acid was normal.  D-dimer minimally elevated.  Troponin bumped to 2.22 and then to 3.416.  AM cortisol only 8.6.  -CTA chest showed no PE, mucoid impaction, small airway disease, and consolidation at the lung bases bilaterally, increased from prior, increased moderate left pleural effusion, emphysema with evidence of pulmonary hypertension.  -Cardiology and pulmonology consulted and input appreciated  -On 3/19 he was transfused 2 units PRBC, bolused IV fluids, started on broad spectrum antibiotics and treated with TXA nebulizers.  Pulmonology felt ct scan likely represented pneumonia.  ENT on call for system felt OK to keep at [facility].  He did not require pressors.  His blood pressure has stabilized, bleeding appears to have stopped and Hb has improved.  -Urine culture is growing E.faecalis S >100,000cfu/ml - await sensitivities.  Blood cultures negative thus far.  Continue vancomycin and cefepime for now.  -AM cortisol is low.  Will check cosyntropin stim test.  -Bump in troponin without chest pain felt secondary to demand ischemia.  Echo noted preserved EF and inferio-basilar hypokinesis.  Not a good candidate for LHC or blood thinning medications.  Cardiology recommended outpatient stress test.  -Continue in ICU for 24 hours more  -ENT consult - Dr. French attempted scope at bedside, no bleeding or other abnormalities noted.   - Currenlty on nitro drip for chest pain which may be contributing to lower readings.    UTI (urinary tract  infection)  -Urine culture growing Enterococcus > 100,000 cfu/ml.  Sensitive to Vanc/Ampicillin  -Continue broad spectrum antibiotics for now and tailor based off results.    Hemoptysis  -Treatment as above.    PEG (percutaneous endoscopic gastrostomy) status  -Continue medications via PEG. Does not take oral intake  -On isosource 1.5 6 days daily with 120cc free water flushes via wife  -Will continue home tube feedings, with nutrition consulted    Tracheostomy present  -Present on admit with bleeding / hemoptysis  -Treating with txa nebs and bleeding appears to have resolved  -ENT - appreciate recommendations  -Continue home glycopyrrolate and guaifensin  -Continue supplemental oxygen    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   -TSH is normal and he is not on treatment as outpatient    Acute respiratory failure with hypoxia  Patient with Hypoxic Respiratory failure which is Acute on chronic.  he is on home oxygen at 2 LPM. Supplemental oxygen was provided and noted- Vent Mode: PS/CPAP  Oxygen Concentration (%):  [] 100  PEEP/CPAP:  [8 cmH20] 8 cmH20  Pressure Support:  [15 cmH20] 15 cmH20  Mean Airway Pressure:  [15.7 cmH20] 15.7 cmH20.   Signs/symptoms of respiratory failure include- tachypnea and increased work of breathing. Contributing diagnoses includes - Aspiration and Pneumonia Labs and images were reviewed. Patient Has not had a recent ABG. Will treat underlying causes and adjust management of respiratory failure as follows   - changed out trach and now placed to ventilator for positive pressure  - had secretions in trach with exchange. Appears to be more comfortable now.    Acute on chronic respiratory failure with hypoxia  -Patient with acute on chronic hypoxic respiratory failure  -At home is on 5L O2 via trach and O2 sats typically in high 80s low 90s  -Sats presently similar, but is having episodes of hemoptysis  -Acute etiology is likely due to pneumonia, mucus plugging  and hemoptysis.  -Consulted pulm-critical care and input appreciated  -Discussed with ENT on call at [facility] 3/19 and OK to keep at . Dr. French evaluated.  -Continue antibiotics as above.  -Continue supplemental O2 via Trach and wean as able. Placed on mechanical ventilation on 3/21 for respiratory distress and increasing oxygen requirements.  Slowly weaning at this time.  Appears more comfortable today.    Acute blood loss anemia  -Hb on admit 7.6 and this morning 8.0  -Baseline Hb 8-9.  -Presented w intermittent hemoptysis via trach x 2 days. Had another episode on morning of 3/19/2023  -Seen at ENT 2 days prior to admit (Dr. Smalls) with exam without source. Trache exchanged at that time.   -Ferritin only 84 and Tsat 18% - consistent with iron deficiency  -Platelets and PT/INR are normal.  -Treated with TXA nebulizer and bleeding has stopped  -Started FeSO4 which he will need at discharge  -Repeat cbc in AM. Hb 7.7 but stable, no further evidence of bleeding.    Other hyperlipidemia  -Continue home statin    Primary hypertension  -BP typically in the 90s to low 100s systolic per chart review.   -Noted hypotension 3/19 which is addressed above.  -Holding home metoprolol and amlodipine    Coronary artery disease involving native coronary artery of native heart without angina pectoris  -Denies chest pain but on 3/19 did have unexplained hypotension with severe back/shoulder pain  -Troponin on admit normal, but on 3/19 bumped to 1.7 and then 2.2 on 3/20.  -No jamil ischemic change on EKG  -Echo showed EF 60%, inferobasal hypokinesis, indeterminate diastolic function  -Cardiology consulted and input appreciated  -Cardiology suspects this is due to demand ischemia associated with his anemia and hypotension.  Further, he is a poor candidate for angiogram given his hemoptysis and inability to treat with blood thinning medications.  Recommended outpatient stress testing  -Holding aspirin given hemoptysis and metoprolol  given hypotension  -Continue statin.  Resume BB as able. On nitro drip for chest pain. - having difficulty weaning off. He is currently cleared for DAPT as no further bleeding issues at this time. Updated Cardiology. Defer ischemic evaluation to Cardiology team.    Squamous cell cancer of tongue  -Diagnosed 12/15/21 via FNA.  Underwent total glossectomy, bilateral neck dissection, bilateral cervical facial flaps and anterolateral thigh flap reconstruction of glossectomy defect 1/4/22  -Completed adjuvant chemoradiation  -Followed by oncology and ENT outpt. No longer on chemo or radiation.   -Had trach changed by ENT 2 days prior to admit and scope at that time showed no signs of bleeding.   -Treatment as above.      VTE Risk Mitigation (From admission, onward)         Ordered     heparin 25,000 units in dextrose 5% 250 ml (100 units/mL) infusion MINIMAL INTENSITY nomogram Anti- Xa  Continuous        Question Answer Comment   Heparin Infusion Adjustment (DO NOT MODIFY ANSWER) [email or web address]\[email or web address]\Images\Pharmacy\HeparinInfusions\heparin MINIMAL INTENSITY nomogram with ANTI-XA [id number]    Begin at (in units/kg/hr) 12        03/20/23 1718     IP VTE HIGH RISK PATIENT  Once         03/18/23 1620     Place sequential compression device  Until discontinued         03/18/23 1620     Place NIKITA hose  Until discontinued         03/18/23 1620                Discharge Planning   NISA:      Code Status: Full Code   Is the patient medically ready for discharge?:     Reason for patient still in hospital (select all that apply): Treatment  Discharge Plan A: Home with family            Critical care time spent on the evaluation and treatment of severe organ dysfunction, review of pertinent labs and imaging studies, discussions with consulting providers and discussions with patient/family: 20 minutes.      Luiz Patel MD  Department of Hospital Medicine   [facility] - Intensive Care

## 2023-03-22 NOTE — ASSESSMENT & PLAN NOTE
-Admitted to inpatient status  -On 3/19 developed hypoxia, hypotension, bleeding from tracheostomy / hemoptysis and severe back pain so moved to ICU  -Prior clinic notes have shown soft blood pressure - but not this low (SBP in 70s).  -Etiology unclear - concerning for hemorrhage given bleeding at trach site and anemia, but could also be sepsis due to UTI (given urine culture growing Enterococcus) or cardiogenic (given troponin bump and severe back pain)  -Lactic acid was normal.  D-dimer minimally elevated.  Troponin bumped to 2.22 and then to 3.416.  AM cortisol only 8.6.  -CTA chest showed no PE, mucoid impaction, small airway disease, and consolidation at the lung bases bilaterally, increased from prior, increased moderate left pleural effusion, emphysema with evidence of pulmonary hypertension.  -Cardiology and pulmonology consulted and input appreciated  -On 3/19 he was transfused 2 units PRBC, bolused IV fluids, started on broad spectrum antibiotics and treated with TXA nebulizers.  Pulmonology felt ct scan likely represented pneumonia.  ENT on call for system felt OK to keep at [facility].  He did not require pressors.  His blood pressure has stabilized, bleeding appears to have stopped and Hb has improved.  -Urine culture is growing E.faecalis S >100,000cfu/ml - await sensitivities.  Blood cultures negative thus far.  Continue vancomycin and cefepime for now.  -AM cortisol is low.  Will check cosyntropin stim test.  -Bump in troponin without chest pain felt secondary to demand ischemia.  Echo noted preserved EF and inferio-basilar hypokinesis.  Not a good candidate for LHC or blood thinning medications.  Cardiology recommended outpatient stress test.  -Continue in ICU for 24 hours more  -ENT consult - Dr. French attempted scope at bedside, no bleeding or other abnormalities noted.   - Currenlty on nitro drip for chest pain which may be contributing to lower readings.

## 2023-03-22 NOTE — PROGRESS NOTES
[facility] [place] Intensive Care  Otorhinolaryngology-Head & Neck Surgery  Progress Note    Patient Name: Fareed Richard Jr.  MRN: [id number]  Code Status: Full Code  Admission Date: 3/18/2023  Hospital Length of Stay: 3 days  Attending Physician: Luiz Patel MD  Primary Care Provider: Kodi Tubbs MD    Subjective:     Interval History: noted events from trach change yesterday and chart reviewed. Pt denies further bleeding episodes from trach since hospitalization. Noted he has been started on heparin drip     Post-Op Info:  * No surgery found *      Hospital Day: 5      Medications:  Continuous Infusions:   fentanyl Stopped (03/22/23 0900)    heparin (porcine) in D5W 18 Units/kg/hr (03/22/23 1230)    nitroGLYCERIN 30 mcg/min (03/22/23 0600)    NORepinephrine bitartrate-D5W Stopped (03/22/23 0627)     Scheduled Meds:   atorvastatin  20 mg Per G Tube Daily    ceFEPime (MAXIPIME) IVPB  2 g Intravenous Q8H    ferrous sulfate  1 tablet Per G Tube Daily    furosemide  40 mg Per G Tube Daily    glycopyrrolate  1 mg Per G Tube TID    LIDOcaine  2 patch Transdermal Q24H    melatonin  9 mg Per G Tube Nightly    methylPREDNISolone sodium succinate injection  60 mg Intravenous Q6H    polyethylene glycol  17 g Oral BID    sodium chloride 0.9%  10 mL Intravenous Q6H    vancomycin (VANCOCIN) IVPB  1,000 mg Intravenous Q12H     PRN Meds:sodium chloride, acetaminophen, albuterol-ipratropium, fentaNYL, guaiFENesin 100 mg/5 ml, hydrOXYzine HCL, naloxone, oxyCODONE, Flushing PICC Protocol **AND** sodium chloride 0.9% **AND** sodium chloride 0.9%, Pharmacy to dose Vancomycin consult **AND** vancomycin - pharmacy to dose     Objective:     Vital Signs (24h Range):  Temp:  [97.5 °F (36.4 °C)-97.8 °F (36.6 °C)] 97.6 °F (36.4 °C)  Pulse:  [] 89  Resp:  [14.9-36] 19.3  SpO2:  [87 %-100 %] 97 %  BP: ()/(45-73) 106/61     Date 03/22/23 0700 - 03/23/23 0659   Shift 0884-1023 4917-1273 2894-7661 24 Hour Total   INTAKE   Shift  Total(mL/kg)       OUTPUT   Urine(mL/kg/hr) 225   225   Shift Total(mL/kg) 225(3.3)   225(3.3)   Weight (kg) 68 68 68 68     Lines/Drains/Airways       Peripherally Inserted Central Catheter Line  Duration             PICC Triple Lumen 03/20/23 2145 right basilic 1 day              Drain  Duration                  Gastrostomy/Enterostomy 01/04/22 Percutaneous endoscopic gastrostomy (PEG)  days              Airway  Duration             Adult Surgical Airway 03/16/23 1014 Shiley Uncuffed 6.0 6 days              Peripheral Intravenous Line  Duration                  Peripheral IV - Single Lumen 03/20/23 1825 22 G Anterior;Right Forearm 1 day                    Physical Exam  HENT:      Head: Normocephalic.   Pulmonary:      Comments: 6 shiley trach tube in place , on positive pressure therapy. No blood or skin breakdown around trach   Neurological:      Mental Status: He is alert.       Significant Labs:  CBC:   Recent Labs   Lab 03/22/23  0450   WBC 6.14   RBC 2.54*   HGB 7.5*   HCT 23.1*      MCV 91   MCH 29.5   MCHC 32.5         Assessment/Plan:     Active Diagnoses:    Diagnosis Date Noted POA    PRINCIPAL PROBLEM:  Hypotension [I95.9] 03/19/2023 Yes    Hemoptysis [R04.2] 03/19/2023 Yes    UTI (urinary tract infection) [N39.0] 03/19/2023 Yes    PEG (percutaneous endoscopic gastrostomy) status [Z93.1] 09/23/2022 Not Applicable    Tracheostomy present [Z93.0] 09/23/2022 Not Applicable    Elevated brain natriuretic peptide (BNP) level [R79.89] 09/23/2022 Yes    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  [E03.2] 08/24/2022 Yes    Acute respiratory failure with hypoxia [J96.01] 04/19/2022 Yes    Chronic respiratory failure with hypoxia, on home O2 therapy [J96.11, Z99.81] 03/14/2022 Not Applicable    Acute blood loss anemia [D62] 02/16/2022 Yes    Coronary artery disease involving native coronary artery of native heart without angina pectoris [I25.10] 12/29/2021 Yes    Other  hyperlipidemia [E78.49] 12/29/2021 Yes    Primary hypertension [I10] 12/29/2021 Yes    Squamous cell cancer of tongue [C02.9] 12/16/2021 Yes      Problems Resolved During this Admission:     No further bleeding from trach site off of TXA nebs  I have discussed case with his oncologist dr. Smalls as well  Call for repeat eval if develops bleeding /issue  Gwen French MD  Otorhinolaryngology-Head & Neck Surgery  [facility] - Intensive Care

## 2023-03-22 NOTE — ASSESSMENT & PLAN NOTE
Patient with Hypoxic Respiratory failure which is Acute on chronic.  he is on home oxygen at 2-3 LPM. Supplemental oxygen was provided and noted- Vent Mode: PS/CPAP  Oxygen Concentration (%):  [40-50] 40  Resp Rate Total:  [21 br/min-26 br/min] 22 br/min  Vt Set:  [370 mL] 370 mL  PEEP/CPAP:  [8 cmH20] 8 cmH20  Pressure Support:  [5 cmH20] 5 cmH20  Mean Airway Pressure:  [10.1 nrD99-41.4 cmH20] 10.3 cmH20.   Signs/symptoms of respiratory failure include- increased work of breathing and hemoptysis. Contributing diagnoses includes - Aspiration and Pneumonia Labs and images were reviewed. Patient Has not had a recent ABG. Will treat underlying causes and adjust management of respiratory failure as follows-     Hemoptysis with increased bilateral infiltrates on chest CTA in the setting of likely aspiration (attempting swallow training with Speech) and chronic aspirin/Plavix.  Although procalcitonin is normal, imaging and history warrant treatment with antibiotics for presumed nosocomial pneumonia.  Moved to ICU due to labile BP and increased oxygen requirements.  After receiving one unit of packed red blood cells, he looks much better and BP is improved.    · Treat for pneumonia (nosocomial pathogens)  · Nebulized bronchodilators given COPD/emphysema changes  · Trach changed to 6.0 cuffed shiley on 3/22 to facilitate ventilatory support after likely aspiration event  · Sputum ordered, follow up results  · Titrate oxygen as needed; wean back to trach collar as tolerated  · No further bleeding noted, no need to continue TXA nebulizer treatments for hemoptysis. Hgb remains stable on heparin gtt.   · OK to restart DAPT from pulmonary standpoint

## 2023-03-22 NOTE — ASSESSMENT & PLAN NOTE
-Patient with acute on chronic hypoxic respiratory failure  -At home is on 5L O2 via trach and O2 sats typically in high 80s low 90s  -Sats presently similar, but is having episodes of hemoptysis  -Acute etiology is likely due to pneumonia, mucus plugging and hemoptysis.  -Consulted pulm-critical care and input appreciated  -Discussed with [place] [facility] [place] and OK to keep at [facility]. Dr. French evaluated.  -Continue antibiotics as above.  -Continue supplemental O2 via Trach and wean as able. Placed on mechanical ventilation on 3/21 for respiratory distress and increasing oxygen requirements.  Slowly weaning at this time.  Appears more comfortable today.

## 2023-03-22 NOTE — ASSESSMENT & PLAN NOTE
-Denies chest pain but on 3/19 did have unexplained hypotension with severe back/shoulder pain  -Troponin on admit normal, but on 3/19 bumped to 1.7 and then 2.2 on 3/20.  -No jamil ischemic change on EKG  -Echo showed EF 60%, inferobasal hypokinesis, indeterminate diastolic function  -Cardiology consulted and input appreciated  -Cardiology suspects this is due to demand ischemia associated with his anemia and hypotension.  Further, he is a poor candidate for angiogram given his hemoptysis and inability to treat with blood thinning medications.  Recommended outpatient stress testing  -Holding aspirin given hemoptysis and metoprolol given hypotension  -Continue statin.  Resume BB as able. On nitro drip for chest pain. - having difficulty weaning off. He is currently cleared for DAPT as no further bleeding issues at this time. Updated Cardiology. Defer ischemic evaluation to Cardiology team.

## 2023-03-23 PROBLEM — F41.9 ANXIETY: Status: ACTIVE | Noted: 2023-03-23

## 2023-03-23 LAB
ALBUMIN SERPL BCP-MCNC: 2.2 G/DL (ref 3.5–5.2)
ANION GAP SERPL CALC-SCNC: 9 MMOL/L (ref 8–16)
BACTERIA BLD CULT: NORMAL
BACTERIA BLD CULT: NORMAL
BASOPHILS # BLD AUTO: 0 K/UL (ref 0–0.2)
BASOPHILS # BLD AUTO: 0 K/UL (ref 0–0.2)
BASOPHILS NFR BLD: 0 % (ref 0–1.9)
BASOPHILS NFR BLD: 0 % (ref 0–1.9)
BUN SERPL-MCNC: 33 MG/DL (ref 8–23)
CALCIUM SERPL-MCNC: 8 MG/DL (ref 8.7–10.5)
CHLORIDE SERPL-SCNC: 94 MMOL/L (ref 95–110)
CO2 SERPL-SCNC: 32 MMOL/L (ref 23–29)
CREAT SERPL-MCNC: 0.7 MG/DL (ref 0.5–1.4)
DIFFERENTIAL METHOD: ABNORMAL
DIFFERENTIAL METHOD: ABNORMAL
EOSINOPHIL # BLD AUTO: 0 K/UL (ref 0–0.5)
EOSINOPHIL # BLD AUTO: 0 K/UL (ref 0–0.5)
EOSINOPHIL NFR BLD: 0 % (ref 0–8)
EOSINOPHIL NFR BLD: 0 % (ref 0–8)
ERYTHROCYTE [DISTWIDTH] IN BLOOD BY AUTOMATED COUNT: 15.1 % (ref 11.5–14.5)
ERYTHROCYTE [DISTWIDTH] IN BLOOD BY AUTOMATED COUNT: 15.1 % (ref 11.5–14.5)
EST. GFR  (NO RACE VARIABLE): >60 ML/MIN/1.73 M^2
FACT X PPP CHRO-ACNC: 0.4 IU/ML (ref 0.3–0.7)
GLUCOSE SERPL-MCNC: 178 MG/DL (ref 70–110)
HCT VFR BLD AUTO: 21.2 % (ref 40–54)
HCT VFR BLD AUTO: 21.2 % (ref 40–54)
HGB BLD-MCNC: 7.1 G/DL (ref 14–18)
HGB BLD-MCNC: 7.1 G/DL (ref 14–18)
IMM GRANULOCYTES # BLD AUTO: 0.07 K/UL (ref 0–0.04)
IMM GRANULOCYTES # BLD AUTO: 0.07 K/UL (ref 0–0.04)
IMM GRANULOCYTES NFR BLD AUTO: 0.7 % (ref 0–0.5)
IMM GRANULOCYTES NFR BLD AUTO: 0.7 % (ref 0–0.5)
LYMPHOCYTES # BLD AUTO: 0.3 K/UL (ref 1–4.8)
LYMPHOCYTES # BLD AUTO: 0.3 K/UL (ref 1–4.8)
LYMPHOCYTES NFR BLD: 2.7 % (ref 18–48)
LYMPHOCYTES NFR BLD: 2.7 % (ref 18–48)
MAGNESIUM SERPL-MCNC: 2.1 MG/DL (ref 1.6–2.6)
MCH RBC QN AUTO: 30.1 PG (ref 27–31)
MCH RBC QN AUTO: 30.1 PG (ref 27–31)
MCHC RBC AUTO-ENTMCNC: 33.5 G/DL (ref 32–36)
MCHC RBC AUTO-ENTMCNC: 33.5 G/DL (ref 32–36)
MCV RBC AUTO: 90 FL (ref 82–98)
MCV RBC AUTO: 90 FL (ref 82–98)
MONOCYTES # BLD AUTO: 0.6 K/UL (ref 0.3–1)
MONOCYTES # BLD AUTO: 0.6 K/UL (ref 0.3–1)
MONOCYTES NFR BLD: 5.4 % (ref 4–15)
MONOCYTES NFR BLD: 5.4 % (ref 4–15)
NEUTROPHILS # BLD AUTO: 9.4 K/UL (ref 1.8–7.7)
NEUTROPHILS # BLD AUTO: 9.4 K/UL (ref 1.8–7.7)
NEUTROPHILS NFR BLD: 91.2 % (ref 38–73)
NEUTROPHILS NFR BLD: 91.2 % (ref 38–73)
NRBC BLD-RTO: 0 /100 WBC
NRBC BLD-RTO: 0 /100 WBC
PHOSPHATE SERPL-MCNC: 2.9 MG/DL (ref 2.7–4.5)
PLATELET # BLD AUTO: 185 K/UL (ref 150–450)
PLATELET # BLD AUTO: 185 K/UL (ref 150–450)
PMV BLD AUTO: 9.4 FL (ref 9.2–12.9)
PMV BLD AUTO: 9.4 FL (ref 9.2–12.9)
POCT GLUCOSE: 212 MG/DL (ref 70–110)
POTASSIUM SERPL-SCNC: 3.8 MMOL/L (ref 3.5–5.1)
RBC # BLD AUTO: 2.36 M/UL (ref 4.6–6.2)
RBC # BLD AUTO: 2.36 M/UL (ref 4.6–6.2)
SODIUM SERPL-SCNC: 135 MMOL/L (ref 136–145)
VANCOMYCIN SERPL-MCNC: 22.5 UG/ML
VANCOMYCIN TROUGH SERPL-MCNC: 22.8 UG/ML (ref 10–22)
WBC # BLD AUTO: 10.27 K/UL (ref 3.9–12.7)
WBC # BLD AUTO: 10.27 K/UL (ref 3.9–12.7)

## 2023-03-23 PROCEDURE — 85520 HEPARIN ASSAY: CPT | Performed by: STUDENT IN AN ORGANIZED HEALTH CARE EDUCATION/TRAINING PROGRAM

## 2023-03-23 PROCEDURE — 94640 AIRWAY INHALATION TREATMENT: CPT

## 2023-03-23 PROCEDURE — C1887 CATHETER, GUIDING: HCPCS | Performed by: INTERNAL MEDICINE

## 2023-03-23 PROCEDURE — 85025 COMPLETE CBC W/AUTO DIFF WBC: CPT | Performed by: HOSPITALIST

## 2023-03-23 PROCEDURE — 99497 PR ADVNCD CARE PLAN 30 MIN: ICD-10-PCS | Mod: 25,,, | Performed by: REGISTERED NURSE

## 2023-03-23 PROCEDURE — 25000242 PHARM REV CODE 250 ALT 637 W/ HCPCS: Performed by: HOSPITALIST

## 2023-03-23 PROCEDURE — 63600175 PHARM REV CODE 636 W HCPCS: Performed by: INTERNAL MEDICINE

## 2023-03-23 PROCEDURE — 25000003 PHARM REV CODE 250: Performed by: HOSPITALIST

## 2023-03-23 PROCEDURE — 93458 L HRT ARTERY/VENTRICLE ANGIO: CPT | Performed by: INTERNAL MEDICINE

## 2023-03-23 PROCEDURE — 99152 MOD SED SAME PHYS/QHP 5/>YRS: CPT | Mod: ,,, | Performed by: INTERNAL MEDICINE

## 2023-03-23 PROCEDURE — 99233 PR SUBSEQUENT HOSPITAL CARE,LEVL III: ICD-10-PCS | Mod: ,,, | Performed by: REGISTERED NURSE

## 2023-03-23 PROCEDURE — 99152 PR MOD CONSCIOUS SEDATION, SAME PHYS, 5+ YRS, FIRST 15 MIN: ICD-10-PCS | Mod: ,,, | Performed by: INTERNAL MEDICINE

## 2023-03-23 PROCEDURE — 25000003 PHARM REV CODE 250: Performed by: INTERNAL MEDICINE

## 2023-03-23 PROCEDURE — 63600175 PHARM REV CODE 636 W HCPCS: Performed by: ANESTHESIOLOGY

## 2023-03-23 PROCEDURE — 20000000 HC ICU ROOM

## 2023-03-23 PROCEDURE — 93458 PR CATH PLACE/CORON ANGIO, IMG SUPER/INTERP,W LEFT HEART VENTRICULOGRAPHY: ICD-10-PCS | Mod: 26,,, | Performed by: INTERNAL MEDICINE

## 2023-03-23 PROCEDURE — 80069 RENAL FUNCTION PANEL: CPT | Performed by: HOSPITALIST

## 2023-03-23 PROCEDURE — 25000003 PHARM REV CODE 250: Performed by: REGISTERED NURSE

## 2023-03-23 PROCEDURE — 99498 ADVNCD CARE PLAN ADDL 30 MIN: CPT | Mod: ,,, | Performed by: REGISTERED NURSE

## 2023-03-23 PROCEDURE — A4216 STERILE WATER/SALINE, 10 ML: HCPCS | Performed by: HOSPITALIST

## 2023-03-23 PROCEDURE — 99900026 HC AIRWAY MAINTENANCE (STAT)

## 2023-03-23 PROCEDURE — 94761 N-INVAS EAR/PLS OXIMETRY MLT: CPT

## 2023-03-23 PROCEDURE — 25500020 PHARM REV CODE 255: Performed by: INTERNAL MEDICINE

## 2023-03-23 PROCEDURE — 93458 L HRT ARTERY/VENTRICLE ANGIO: CPT | Mod: 26,,, | Performed by: INTERNAL MEDICINE

## 2023-03-23 PROCEDURE — 99233 SBSQ HOSP IP/OBS HIGH 50: CPT | Mod: ,,, | Performed by: REGISTERED NURSE

## 2023-03-23 PROCEDURE — C1769 GUIDE WIRE: HCPCS | Performed by: INTERNAL MEDICINE

## 2023-03-23 PROCEDURE — 94003 VENT MGMT INPAT SUBQ DAY: CPT

## 2023-03-23 PROCEDURE — 99153 MOD SED SAME PHYS/QHP EA: CPT | Performed by: INTERNAL MEDICINE

## 2023-03-23 PROCEDURE — 99498 PR ADVNCD CARE PLAN ADDL 30 MIN: ICD-10-PCS | Mod: ,,, | Performed by: REGISTERED NURSE

## 2023-03-23 PROCEDURE — 80202 ASSAY OF VANCOMYCIN: CPT | Performed by: HOSPITALIST

## 2023-03-23 PROCEDURE — 80202 ASSAY OF VANCOMYCIN: CPT | Mod: 91 | Performed by: STUDENT IN AN ORGANIZED HEALTH CARE EDUCATION/TRAINING PROGRAM

## 2023-03-23 PROCEDURE — C1894 INTRO/SHEATH, NON-LASER: HCPCS | Performed by: INTERNAL MEDICINE

## 2023-03-23 PROCEDURE — 27000221 HC OXYGEN, UP TO 24 HOURS

## 2023-03-23 PROCEDURE — 63600175 PHARM REV CODE 636 W HCPCS: Mod: TB,JG | Performed by: HOSPITALIST

## 2023-03-23 PROCEDURE — 25000003 PHARM REV CODE 250: Performed by: STUDENT IN AN ORGANIZED HEALTH CARE EDUCATION/TRAINING PROGRAM

## 2023-03-23 PROCEDURE — 83735 ASSAY OF MAGNESIUM: CPT | Performed by: HOSPITALIST

## 2023-03-23 PROCEDURE — 99152 MOD SED SAME PHYS/QHP 5/>YRS: CPT | Performed by: INTERNAL MEDICINE

## 2023-03-23 PROCEDURE — 99497 ADVNCD CARE PLAN 30 MIN: CPT | Mod: 25,,, | Performed by: REGISTERED NURSE

## 2023-03-23 PROCEDURE — 99900035 HC TECH TIME PER 15 MIN (STAT)

## 2023-03-23 RX ORDER — LORAZEPAM 2 MG/ML
0.5 INJECTION INTRAMUSCULAR ONCE
Status: COMPLETED | OUTPATIENT
Start: 2023-03-23 | End: 2023-03-23

## 2023-03-23 RX ORDER — FENTANYL CITRATE 50 UG/ML
INJECTION, SOLUTION INTRAMUSCULAR; INTRAVENOUS
Status: DISCONTINUED | OUTPATIENT
Start: 2023-03-23 | End: 2023-03-23 | Stop reason: HOSPADM

## 2023-03-23 RX ORDER — ATORVASTATIN CALCIUM 40 MG/1
80 TABLET, FILM COATED ORAL DAILY
Status: DISCONTINUED | OUTPATIENT
Start: 2023-03-24 | End: 2023-04-30 | Stop reason: HOSPADM

## 2023-03-23 RX ORDER — MUPIROCIN 20 MG/G
OINTMENT TOPICAL 2 TIMES DAILY
Status: COMPLETED | OUTPATIENT
Start: 2023-03-23 | End: 2023-03-27

## 2023-03-23 RX ORDER — NITROGLYCERIN 0.4 MG/1
0.4 TABLET SUBLINGUAL EVERY 5 MIN PRN
Status: DISCONTINUED | OUTPATIENT
Start: 2023-03-23 | End: 2023-03-30

## 2023-03-23 RX ORDER — LIDOCAINE HYDROCHLORIDE 10 MG/ML
INJECTION, SOLUTION EPIDURAL; INFILTRATION; INTRACAUDAL; PERINEURAL
Status: DISCONTINUED | OUTPATIENT
Start: 2023-03-23 | End: 2023-03-23 | Stop reason: HOSPADM

## 2023-03-23 RX ORDER — HYDROXYZINE HYDROCHLORIDE 25 MG/1
25 TABLET, FILM COATED ORAL ONCE
Status: COMPLETED | OUTPATIENT
Start: 2023-03-23 | End: 2023-03-23

## 2023-03-23 RX ORDER — SODIUM CHLORIDE 9 MG/ML
INJECTION, SOLUTION INTRAVENOUS CONTINUOUS
Status: ACTIVE | OUTPATIENT
Start: 2023-03-23 | End: 2023-03-23

## 2023-03-23 RX ORDER — FUROSEMIDE 40 MG/1
40 TABLET ORAL 2 TIMES DAILY
Status: DISCONTINUED | OUTPATIENT
Start: 2023-03-23 | End: 2023-04-01

## 2023-03-23 RX ORDER — ACETAMINOPHEN 325 MG/1
650 TABLET ORAL EVERY 4 HOURS PRN
Status: DISCONTINUED | OUTPATIENT
Start: 2023-03-23 | End: 2023-04-05

## 2023-03-23 RX ORDER — HYDROXYZINE HYDROCHLORIDE 25 MG/1
25 TABLET, FILM COATED ORAL EVERY 4 HOURS PRN
Status: DISCONTINUED | OUTPATIENT
Start: 2023-03-23 | End: 2023-04-10

## 2023-03-23 RX ORDER — HYDROXYZINE HYDROCHLORIDE 25 MG/1
25 TABLET, FILM COATED ORAL 3 TIMES DAILY PRN
Status: DISCONTINUED | OUTPATIENT
Start: 2023-03-23 | End: 2023-03-23

## 2023-03-23 RX ORDER — HYDROCODONE BITARTRATE AND ACETAMINOPHEN 5; 325 MG/1; MG/1
1 TABLET ORAL EVERY 4 HOURS PRN
Status: DISCONTINUED | OUTPATIENT
Start: 2023-03-23 | End: 2023-03-30

## 2023-03-23 RX ORDER — MIDAZOLAM HYDROCHLORIDE 1 MG/ML
INJECTION, SOLUTION INTRAMUSCULAR; INTRAVENOUS
Status: DISCONTINUED | OUTPATIENT
Start: 2023-03-23 | End: 2023-03-23 | Stop reason: HOSPADM

## 2023-03-23 RX ORDER — ONDANSETRON 4 MG/1
8 TABLET, ORALLY DISINTEGRATING ORAL EVERY 8 HOURS PRN
Status: DISCONTINUED | OUTPATIENT
Start: 2023-03-23 | End: 2023-03-28 | Stop reason: SDUPTHER

## 2023-03-23 RX ADMIN — ASPIRIN 81 MG: 81 TABLET, COATED ORAL at 08:03

## 2023-03-23 RX ADMIN — FERROUS SULFATE TAB 325 MG (65 MG ELEMENTAL FE) 1 EACH: 325 (65 FE) TAB at 08:03

## 2023-03-23 RX ADMIN — ATORVASTATIN CALCIUM 20 MG: 10 TABLET, FILM COATED ORAL at 08:03

## 2023-03-23 RX ADMIN — HYDROCODONE BITARTRATE AND ACETAMINOPHEN 1 TABLET: 5; 325 TABLET ORAL at 04:03

## 2023-03-23 RX ADMIN — IPRATROPIUM BROMIDE AND ALBUTEROL SULFATE 3 ML: 2.5; .5 SOLUTION RESPIRATORY (INHALATION) at 10:03

## 2023-03-23 RX ADMIN — CEFEPIME HYDROCHLORIDE 2 G: 2 INJECTION, SOLUTION INTRAVENOUS at 12:03

## 2023-03-23 RX ADMIN — POLYETHYLENE GLYCOL 3350 17 G: 17 POWDER, FOR SOLUTION ORAL at 08:03

## 2023-03-23 RX ADMIN — GLYCOPYRROLATE 1 MG: 1 TABLET ORAL at 08:03

## 2023-03-23 RX ADMIN — PREDNISONE 50 MG: 50 TABLET ORAL at 07:03

## 2023-03-23 RX ADMIN — CEFEPIME HYDROCHLORIDE 2 G: 2 INJECTION, SOLUTION INTRAVENOUS at 05:03

## 2023-03-23 RX ADMIN — HYDROXYZINE HYDROCHLORIDE 25 MG: 25 TABLET ORAL at 08:03

## 2023-03-23 RX ADMIN — Medication 10 ML: at 12:03

## 2023-03-23 RX ADMIN — SODIUM CHLORIDE: 9 INJECTION, SOLUTION INTRAVENOUS at 04:03

## 2023-03-23 RX ADMIN — HYDROXYZINE HYDROCHLORIDE 25 MG: 25 TABLET ORAL at 12:03

## 2023-03-23 RX ADMIN — FENTANYL CITRATE 50 MCG: 50 INJECTION INTRAMUSCULAR; INTRAVENOUS at 10:03

## 2023-03-23 RX ADMIN — MELATONIN TAB 3 MG 9 MG: 3 TAB at 08:03

## 2023-03-23 RX ADMIN — LORAZEPAM 0.5 MG: 2 INJECTION INTRAMUSCULAR; INTRAVENOUS at 11:03

## 2023-03-23 RX ADMIN — CLOPIDOGREL BISULFATE 75 MG: 75 TABLET ORAL at 08:03

## 2023-03-23 RX ADMIN — LIDOCAINE 5% 2 PATCH: 700 PATCH TOPICAL at 12:03

## 2023-03-23 RX ADMIN — MUPIROCIN: 20 OINTMENT TOPICAL at 12:03

## 2023-03-23 RX ADMIN — FUROSEMIDE 40 MG: 40 TABLET ORAL at 08:03

## 2023-03-23 RX ADMIN — CEFEPIME HYDROCHLORIDE 2 G: 2 INJECTION, SOLUTION INTRAVENOUS at 10:03

## 2023-03-23 RX ADMIN — VANCOMYCIN HYDROCHLORIDE 1000 MG: 1 INJECTION, POWDER, LYOPHILIZED, FOR SOLUTION INTRAVENOUS at 03:03

## 2023-03-23 RX ADMIN — MUPIROCIN: 20 OINTMENT TOPICAL at 08:03

## 2023-03-23 RX ADMIN — FUROSEMIDE 40 MG: 40 TABLET ORAL at 05:03

## 2023-03-23 NOTE — RESPIRATORY THERAPY
Patient placed on servoU ventilator with charted settings PS/CPAP peep +8, 40%. Will continue to monitor.

## 2023-03-23 NOTE — PLAN OF CARE
Continuing antibiotics as ordered for any infection. Trying to promote good rest and maintain sleep/wake cycle for optimal participation in activities during the day and continued respiratory weaning. Tolerating vent well thus far overnight while sleeping.

## 2023-03-23 NOTE — SUBJECTIVE & OBJECTIVE
Past Medical History:   Diagnosis Date    Cancer     skin to Rt forearm and face    COPD (chronic obstructive pulmonary disease)     Hyperlipidemia     Hypertension     Pseudoaneurysm      Past Surgical History:   Procedure Laterality Date    ABDOMINAL SURGERY      stents placed in liver and large intestines, per patient    CAROTID STENT      COLONOSCOPY N/A 09/27/2022    Procedure: COLONOSCOPY;  Surgeon: Donnie Peterson MD;  Location: [facility] ENDO (2ND FLR);  Service: Endoscopy;  Laterality: N/A;    COLONOSCOPY N/A 09/30/2022    Procedure: COLONOSCOPY;  Surgeon: Joy Cabrera MD;  Location: [facility] ENDO (2ND FLR);  Service: Endoscopy;  Laterality: N/A;    CORONARY STENT PLACEMENT  01/2000    DISSECTION OF NECK Bilateral 01/04/2022    Procedure: DISSECTION, NECK;  Surgeon: Naeem Smalls MD;  [facility];  Service: ENT;  Laterality: Bilateral;    ESOPHAGOGASTRODUODENOSCOPY N/A 09/27/2022    Procedure: EGD (ESOPHAGOGASTRODUODENOSCOPY);  Surgeon: Donnie Peterson MD;  Location: [facility] ENDO (2ND FLR);  Service: Endoscopy;  Laterality: N/A;    ESOPHAGOGASTRODUODENOSCOPY N/A 09/30/2022    Procedure: EGD (ESOPHAGOGASTRODUODENOSCOPY);  Surgeon: Joy Cabrera MD;  Location: [facility] ENDO (2ND FLR);  Service: Endoscopy;  Laterality: N/A;    ESOPHAGOGASTRODUODENOSCOPY W/ PEG N/A 01/04/2022    Procedure: EGD, WITH PEG TUBE INSERTION;  Surgeon: Anthony Calabrese MD;  [facility];  Service: General;  Laterality: N/A;    EYE SURGERY      Cataract bilateral    femoral stents      bilateral    FLAP PROCEDURE N/A 01/03/2022    Procedure: CREATION, FREE FLAP;  Surgeon: Naeem Smalls MD;  [facility];  Service: ENT;  Laterality: N/A;    FLAP PROCEDURE Right 01/04/2022    Procedure: CREATION, FREE FLAP;  Surgeon: Stacey Conde MD;  [facility];  Service: ENT;  Laterality: Right;  Ischemic start 1203  Ischemic stop 1353    GLOSSECTOMY N/A 01/04/2022    Procedure: GLOSSECTOMY;   Surgeon: Naeem Smalls MD;  Location: [facility] OR [address] [place] [address];  Service: ENT;  Laterality: N/A;    RADICAL NECK DISSECTION Left 2022    Procedure: DISSECTION, NECK, RADICAL;  Surgeon: Naeem Smalls MD;  [facility];  Service: ENT;  Laterality: Left;    SKIN BIOPSY      SKIN CANCER EXCISION      TRACHEOTOMY N/A 2022    Procedure: TRACHEOTOMY;  Surgeon: Naeem Smalls MD;  [facility];  Service: ENT;  Laterality: N/A;    VASCULAR SURGERY       Review of patient's allergies indicates:  No Known Allergies    Medications:  Continuous Infusions:   sodium chloride 0.9% 100 mL/hr at 23 1648    nitroGLYCERIN 10 mcg/min (23 0600)    NORepinephrine bitartrate-D5W Stopped (23 06)     Scheduled Meds:   aspirin  81 mg Oral Daily    [START ON 3/24/2023] atorvastatin  80 mg Per G Tube Daily    ceFEPime (MAXIPIME) IVPB  2 g Intravenous Q8H    clopidogreL  75 mg Oral Daily    ferrous sulfate  1 tablet Per G Tube Daily    furosemide  40 mg Per G Tube BID    glycopyrrolate  1 mg Per G Tube TID    LIDOcaine  2 patch Transdermal Q24H    melatonin  9 mg Per G Tube Nightly    mupirocin   Nasal BID    polyethylene glycol  17 g Oral BID    predniSONE  50 mg Per G Tube Daily    sodium chloride 0.9%  10 mL Intravenous Q6H     PRN Meds:sodium chloride, acetaminophen, acetaminophen, albuterol-ipratropium, fentaNYL, guaiFENesin 100 mg/5 ml, HYDROcodone-acetaminophen, hydrOXYzine HCL, naloxone, nitroGLYCERIN, ondansetron, oxyCODONE, Flushing PICC Protocol **AND** sodium chloride 0.9% **AND** sodium chloride 0.9%, Pharmacy to dose Vancomycin consult **AND** vancomycin - pharmacy to dose    Family History    None       Tobacco Use    Smoking status: Former     Packs/day: 2.00     Years: 40.00     Pack years: 80.00     Types: Cigarettes     Start date: 1963     Quit date: 2018     Years since quittin.9    Smokeless tobacco: Never    Tobacco comments:     3/3 ppd x  40 yrs. Currently 3-4 cigarettes daily .He is trying  to quit. Is using a Vapor cigarettes  2-3 x's a day.   Substance and Sexual Activity    Alcohol use: Not Currently    Drug use: No    Sexual activity: Not Currently       Objective:     Vital Signs (Most Recent):  Temp: 97.2 °F (36.2 °C) (03/23/23 1630)  Pulse: 87 (03/23/23 1700)  Resp: 15 (03/23/23 1700)  BP: (!) 146/79 (03/23/23 1700)  SpO2: 100 % (03/23/23 1700)   Vital Signs (24h Range):  Temp:  [97.2 °F (36.2 °C)-97.8 °F (36.6 °C)] 97.2 °F (36.2 °C)  Pulse:  [] 87  Resp:  [13-29] 15  SpO2:  [95 %-100 %] 100 %  BP: ()/(51-98) 146/79     Weight: 68 kg (150 lb)  Body mass index is 22.81 kg/m².    Physical Exam  Constitutional:       General: He is not in acute distress.     Appearance: He is ill-appearing.   HENT:      Head: Atraumatic.      Comments: History of facial/oral reconstruction   Pulmonary:      Effort: Pulmonary effort is normal.      Comments: Trach  Abdominal:      Comments: PEG   Musculoskeletal:      Right lower leg: No edema.      Left lower leg: No edema.   Neurological:      Mental Status: He is alert and oriented to person, place, and time.   Psychiatric:         Mood and Affect: Mood is anxious.      Comments: Frustration with difficulties with communication        Advance Care Planning   Advance Directives:   Living Will: No    La[place]: No    Do Not Resuscitate Status: No    Medical Power of : No      Decision Making:  Patient answered questions and Family answered questions  Goals of Care: The patient endorses that what is most important right now is to focus on spending time at home, remaining as independent as possible, symptom/pain control, and curative/life-prolongation (regardless of treatment burdens)    Accordingly, we have decided that the best plan to meet the patient's goals includes continuing with treatment       Significant Labs: All pertinent labs within the past 24 hours have been reviewed.  CBC:   Recent  Labs   Lab 03/23/23 0332   WBC 10.27  10.27   HGB 7.1*  7.1*   HCT 21.2*  21.2*   MCV 90  90     185       BMP:  Recent Labs   Lab 03/23/23 0332   *   *   K 3.8   CL 94*   CO2 32*   BUN 33*   CREATININE 0.7   CALCIUM 8.0*   MG 2.1       LFT:  Lab Results   Component Value Date    AST 22 03/19/2023    ALKPHOS 65 03/19/2023    BILITOT 0.4 03/19/2023     Albumin:   Albumin   Date Value Ref Range Status   03/23/2023 2.2 (L) 3.5 - 5.2 g/dL Final     Protein:   Total Protein   Date Value Ref Range Status   03/19/2023 5.5 (L) 6.0 - 8.4 g/dL Final     Lactic acid:   Lab Results   Component Value Date    LACTATE 1.8 03/19/2023    LACTATE 0.8 05/14/2022       Significant Imaging: I have reviewed all pertinent imaging results/findings within the past 24 hours.

## 2023-03-23 NOTE — CARE UPDATE
Tolerating plavix. Denies further CP. Less SOB  Discussed LHC today - risks/benefits explained and he agrees to proceed

## 2023-03-23 NOTE — SUBJECTIVE & OBJECTIVE
Interval History: difficulty with bpap via trache last night.  Precedex started due to agitation.      Objective:     Vital Signs (Most Recent):  Temp: 97.2 °F (36.2 °C) (03/23/23 1100)  Pulse: 77 (03/23/23 1100)  Resp: (!) 27 (03/23/23 1100)  BP: 120/66 (03/23/23 1100)  SpO2: 98 % (03/23/23 1100)   Vital Signs (24h Range):  Temp:  [97.2 °F (36.2 °C)-98.8 °F (37.1 °C)] 97.2 °F (36.2 °C)  Pulse:  [] 77  Resp:  [13-28] 27  SpO2:  [94 %-100 %] 98 %  BP: ()/(51-98) 120/66     Weight: 68 kg (150 lb)  Body mass index is 22.81 kg/m².      Intake/Output Summary (Last 24 hours) at 3/23/2023 1323  Last data filed at 3/23/2023 1100  Gross per 24 hour   Intake 1604.55 ml   Output 1175 ml   Net 429.55 ml         Physical Exam  Vitals and nursing note reviewed.   Constitutional:       General: He is not in acute distress.     Appearance: He is ill-appearing. He is not toxic-appearing.   Neck:      Comments: Tracheostomy in place.  Cardiovascular:      Rate and Rhythm: Normal rate and regular rhythm.      Heart sounds: Normal heart sounds. No murmur heard.    No gallop.   Pulmonary:      Effort: Pulmonary effort is normal.      Breath sounds: No wheezing, rhonchi or rales.   Abdominal:      General: There is no distension.      Palpations: Abdomen is soft.      Tenderness: There is no abdominal tenderness.   Musculoskeletal:      Right lower leg: No edema.      Left lower leg: No edema.   Neurological:      Mental Status: He is alert and oriented to person, place, and time. Mental status is at baseline.   Psychiatric:         Mood and Affect: Mood normal.         Thought Content: Thought content normal.     Review of Systems   Respiratory:  Negative for shortness of breath.    Cardiovascular:  Negative for chest pain.   Gastrointestinal:  Negative for abdominal distention, abdominal pain, blood in stool and constipation.   Genitourinary:  Negative for difficulty urinating.   Psychiatric/Behavioral:  The patient is  nervous/anxious.      Vents:  Vent Mode: PS/CPAP (03/23/23 0744)  Set Rate: 12 BPM (03/22/23 0343)  Vt Set: 370 mL (03/21/23 1615)  Pressure Support: 5 cmH20 (03/23/23 0744)  PEEP/CPAP: 8 cmH20 (03/23/23 0744)  Oxygen Concentration (%): 40 (03/23/23 1100)  Peak Airway Pressure: 14.4 cmH20 (03/23/23 0744)  Total Ve: 11.64 L/m (03/23/23 0744)  F/VT Ratio<105 (RSBI): (!) 37.75 (03/23/23 0744)    Lines/Drains/Airways       Peripherally Inserted Central Catheter Line  Duration             PICC Triple Lumen 03/20/23 2145 right basilic 2 days              Drain  Duration                  Gastrostomy/Enterostomy 01/04/22 Percutaneous endoscopic gastrostomy (PEG)  days              Airway  Duration             Adult Surgical Airway 03/16/23 1014 Shiley Uncuffed 6.0 7 days              Peripheral Intravenous Line  Duration                  Peripheral IV - Single Lumen 03/20/23 1825 22 G Anterior;Right Forearm 2 days                    Significant Labs:    CBC/Anemia Profile:  Recent Labs   Lab 03/22/23  0233 03/22/23  0450 03/23/23  0332   WBC 5.13  5.13 6.14 10.27  10.27   HGB 6.9*  6.9* 7.5* 7.1*  7.1*   HCT 20.9*  20.9* 23.1* 21.2*  21.2*     163 199 185  185   MCV 91  91 91 90  90   RDW 15.2*  15.2* 15.2* 15.1*  15.1*          Chemistries:  Recent Labs   Lab 03/22/23  0233 03/23/23  0332    135*   K 4.2 3.8   CL 97 94*   CO2 33* 32*   BUN 24* 33*   CREATININE 0.8 0.7   CALCIUM 8.1* 8.0*   ALBUMIN  --  2.2*   MG 1.9 2.1   PHOS  --  2.9         All pertinent labs within the past 24 hours have been reviewed.    Significant Imaging:  I have reviewed all pertinent imaging results/findings within the past 24 hours.

## 2023-03-23 NOTE — PROGRESS NOTES
Pharmacokinetic Assessment Follow Up: IV Vancomycin    Vancomycin serum concentration assessment(s):    The trough level was drawn correctly and can be used to guide therapy at this time. The measurement is above the desired definitive target range of 10 to 20 mcg/mL.    Vancomycin Regimen Plan:    Discontinue the scheduled vancomycin regimen and re-dose when the random level is less than 20 mcg/mL, next level to be drawn at 1500 on 3/23/23.    Drug levels (last 3 results):  Recent Labs   Lab Result Units 03/21/23  0242 03/23/23  0332   Vancomycin-Trough ug/mL 16.1 22.8*       Pharmacy will continue to follow and monitor vancomycin.    Please contact pharmacy at extension [phone] for questions regarding this assessment.    Thank you for the consult,   Trung Navarro       Patient brief summary:  Fareed Richard Jr. is a 73 y.o. male initiated on antimicrobial therapy with IV Vancomycin for treatment of sepsis    The patient's current regimen is random pulse dosing    Drug Allergies:   Review of patient's allergies indicates:  No Known Allergies    Actual Body Weight:   68 kg    Renal Function:   Estimated Creatinine Clearance: 77.9 mL/min (based on SCr of 0.8 mg/dL).,     Dialysis Method (if applicable):  N/A    CBC (last 72 hours):  Recent Labs   Lab Result Units 03/20/23  1642 03/20/23  1744 03/21/23  0242 03/22/23  0233 03/22/23  0450 03/23/23  0332   WBC K/uL 11.86 9.89 10.11  10.11 5.13  5.13 6.14 10.27  10.27   Hemoglobin g/dL 9.2* 8.7* 7.7*  7.7* 6.9*  6.9* 7.5* 7.1*  7.1*   Hematocrit % 28.7* 27.6* 25.5*  25.5* 20.9*  20.9* 23.1* 21.2*  21.2*   Platelets K/uL 174 170 191  191 163  163 199 185  185   Gran % % 85.2* 87.8* 79.1*  79.1* 91.6*  91.6* 92.9* 91.2*  91.2*   Lymph % % 4.6* 3.4* 7.2*  7.2* 5.5*  5.5* 4.1* 2.7*  2.7*   Mono % % 7.3 6.7 10.7  10.7 2.3*  2.3* 2.3* 5.4  5.4   Eosinophil % % 1.7 1.1 2.2  2.2 0.0  0.0 0.0 0.0  0.0   Basophil % % 0.3 0.2 0.2  0.2 0.2  0.2 0.2 0.0   0.0   Differential Method  Automated Automated Automated  Automated Automated  Automated Automated Automated  Automated       Metabolic Panel (last 72 hours):  Recent Labs   Lab Result Units 03/21/23  0242 03/22/23  0233 03/23/23  0332   Sodium mmol/L 139 137  --    Potassium mmol/L 3.7 4.2  --    Chloride mmol/L 102 97  --    CO2 mmol/L 31* 33*  --    Glucose mg/dL 177* 181*  --    BUN mg/dL 19 24*  --    Creatinine mg/dL 0.7 0.8  --    Magnesium mg/dL 1.8 1.9 2.1       Vancomycin Administrations:  vancomycin given in the last 96 hours                     vancomycin (VANCOCIN) 1,000 mg in dextrose 5 % (D5W) 250 mL IVPB (Vial-Mate) (mg) 1,000 mg New Bag 03/23/23 0333     1,000 mg New Bag 03/22/23 1511     1,000 mg New Bag  0454     1,000 mg New Bag 03/21/23 1719     1,000 mg New Bag  0357     1,000 mg New Bag 03/20/23 1432      Restarted  0617     1,000 mg New Bag  0407    vancomycin (VANCOCIN) 1,750 mg in dextrose 5 % (D5W) 500 mL IVPB (mg) 1,750 mg New Bag 03/19/23 1446                    Microbiologic Results:  Microbiology Results (last 7 days)       Procedure Component Value Units Date/Time    Culture, Respiratory with Gram Stain [[id number]] Collected: 03/22/23 1528    Order Status: Sent Specimen: Respiratory from Endotracheal Aspirate Updated: 03/22/23 1546    Blood culture [[id number]] Collected: 03/19/23 1307    Order Status: Completed Specimen: Blood from Antecubital, Right Hand Updated: 03/22/23 1503     Blood Culture, Routine No Growth to date      No Growth to date      No Growth to date      No Growth to date    Blood culture [[id number]] Collected: 03/19/23 1307    Order Status: Completed Specimen: Blood from Hand Updated: 03/22/23 1503     Blood Culture, Routine No Growth to date      No Growth to date      No Growth to date      No Growth to date    Respiratory Viral Panel by PCR (Sources other than NP Swab) [[id number]] Collected: 03/20/23 7815    Order Status: Canceled Specimen: Respiratory      AFB Culture & Smear [[id number]]     Order Status: No result Specimen: Sputum     Urine culture [[id number]]  (Abnormal)  (Susceptibility) Collected: 03/18/23 1400    Order Status: Completed Specimen: Urine Updated: 03/20/23 0958     Urine Culture, Routine ENTEROCOCCUS FAECALIS  >100,000 cfu/ml      Narrative:      Specimen Source->Urine

## 2023-03-23 NOTE — ASSESSMENT & PLAN NOTE
- see respiratory distress and squamous cell CA   - was initially admitted with significant bleeding to trach site which is now resolved, per notes and MDT discussion  - now off vent   - pt has frustrations with limited ability to verbalize/communicate; can become frustrated/anxious/dyspneic at times related to this; writes in a notebook often to support communication   - noted; management per hospital primary and PCCM

## 2023-03-23 NOTE — ASSESSMENT & PLAN NOTE
- see respiratory distress and squamous cell CA   - was initially admitted with significant bleeding to trach site which is now resolved, per notes and MDT discussion  - now on vent   - noted; management per hospital primary and PCCM

## 2023-03-23 NOTE — ASSESSMENT & PLAN NOTE
Palliative Consult  Date: 3/23/2023  - consult received; interval chart reviewed in detail; discussed pt during ICU MDT rounds   - met with patient at ICU bedside; introduction to palliative medicine team and role in current care and admission  - initial consult on 3/22, but pt's condition did not allow for GOC discussion, post poned as to not cause increased anxiety and respiratory compromise; previous detailed GOC/ACP discussion with, then primary, Dr. Escoto   - Assessed pt and discussed plan to have GOC/ACP discussion with wife present later in the day  - as time for planned discussion neared, pt became very anxious of pending discussion; discussed role of palliative and assured that I do not wish to cause pt anxiety about discussions, and he can request to not discuss at all; explained my intentions of meeting and that I would not try to persuade him into any plans that do not align with his GOC   - validated all the hard work and things pt has overcome in his cancer treatment   - pt seemed very relieved by above discussion and agreeable for further meeting on 3/23, with wife present   - emotional support provided; allowed time for questions/concerns, all addressed   - MDT updated, and ongoing communication with MDT

## 2023-03-23 NOTE — ASSESSMENT & PLAN NOTE
Palliative Encounter  Date: 3/23/2023  - interval chart reviewed in detail; discussed pt with ICU MDT Rounds   - met with pt and wife at bedside for planned GOC/ACP discussion; pt with improved calmness related to this discussion; reassured pt at anytime if there was a topic he did not wish to discuss, or wished to stop meeting to let me know   - introduction to palliative medicine and role in current/future care provided to pt's wife  - discussed previously stated goals (as discussed with Dr. Escoto); pt confirms confirmed continued desire for full code and full treatment; reassured these wishes are and will be respected; encouraged that if at anytime those wishes were to change that it could be discussed further at that time   - allowed pt and wife to share long road of cancer treatment and how this affects current GOC   - plan now in place for cardiac cath procedure today; briefly discussed  - discussed pt's symptoms of anxiety; wife also provided concerns of pt having a more depressed mood; validated anxiety and depression symptoms given pt's condition and other symptoms; discussed correlation of anxiety and respiratory symptoms; discussed options for anxiety management (see anxiety)   - pt's wife shared pt's fear of requiring a nursing home as likely source of anxiety related to previous visits; pt advocates that he would never wish to live in a  NH; wife is primary care giver and confirms they have a lot of equipment at home to help care for pt, and she is well versed on his care needs; bedside RN shared wife's concern for needed feeding supplies and requesting CM/SW assistance prior to discharge; CM/SW consult placed   - recommended home palliative or palliative clinic for continued palliative support, symptom management, and ACP discussions; they are agreeable to this; plan for follow up to determine if home palliative or clinic preferred  - emotional support provided; allowed time for questions/concerns,  all addressed   - hospital primary updated    Palliative Consult  Date: 3/22/2023 (date corrections to initial consult)   - consult received; interval chart reviewed in detail; discussed pt during ICU MDT rounds   - met with patient at ICU bedside; introduction to palliative medicine team and role in current care and admission  - initial consult on 3/21, but pt's condition did not allow for GOC discussion, post poned as to not cause increased anxiety and respiratory compromise; previous detailed GOC/ACP discussion with, then primary, Dr. Escoto   - Assessed pt and discussed plan to have GOC/ACP discussion with wife present later in the day  - as time for planned discussion neared, pt became very anxious of pending discussion; discussed role of palliative and assured that I do not wish to cause pt anxiety about discussions, and he can request to not discuss at all; explained my intentions of meeting and that I would not try to persuade him into any plans that do not align with his GOC   - validated all the hard work and things pt has overcome in his cancer treatment   - pt seemed very relieved by above discussion and agreeable for further meeting on 3/23, with wife present   - emotional support provided; allowed time for questions/concerns, all addressed   - MDT updated, and ongoing communication with MDT

## 2023-03-23 NOTE — CARE UPDATE
[facility] Intensive Care  ICU Shift Summary  Date: 3/23/2023      Prehospitalization: Home  Admit Date / LOS : 3/18/2023/ 4 days    Diagnosis: Hypotension    Consults:        Active: Cardio, Palliative, and Pulm CC       Needed: N/A     Code Status: Full Code   Advanced Directive: Not Received    LDA:  Lines/Drains/Airways       Peripherally Inserted Central Catheter Line  Duration             PICC Triple Lumen 03/20/23 2145 right basilic 2 days              Drain  Duration                  Gastrostomy/Enterostomy 01/04/22 Percutaneous endoscopic gastrostomy (PEG)  days              Airway  Duration             Adult Surgical Airway 03/16/23 1014 Shiley Uncuffed 6.0 6 days              Peripheral Intravenous Line  Duration                  Peripheral IV - Single Lumen 03/20/23 1825 22 G Anterior;Right Forearm 2 days                  Central Lines/Site/Justification:Multiple GTTS  Urinary Cath/Order/Justification:Patient Does Not Have Urinary Catheter    Vasopressors/Infusions:    fentanyl Stopped (03/22/23 0819)    heparin (porcine) in D5W 19 Units/kg/hr (03/23/23 0500)    nitroGLYCERIN 10 mcg/min (03/23/23 0500)    NORepinephrine bitartrate-D5W Stopped (03/22/23 0627)          GOALS: Volume/ Hemodynamic: MAP >65                     RASS: 0  alert and calm    Pain Management: PO, IV       Pain Controlled: yes     Rhythm: NSR, Sinus tach when awake and a bit anxious    Respiratory Device: Vent overnight, trach collar during day    Vent Mode: PS/CPAP  Oxygen Concentration (%):  [40-50] 40  Resp Rate Total:  [15 br/min-18 br/min] 18 br/min  PEEP/CPAP:  [8 cmH20] 8 cmH20  Pressure Support:  [5 cmH20] 5 cmH20  Mean Airway Pressure:  [9.8 cmH20-10.3 cmH20] 9.8 cmH20             Most Recent SBT/ SAT: N/A       MOVE Screen: PASS  ICU Liberation: not applicable    VTE Prophylaxis: Pharm  Mobility: Bedrest, goal OOB today  Stress Ulcer Prophylaxis: Yes    Isolation: No active isolations    Dietary:   Current Diet  Order   Procedures    Diet NPO      Tolerance: yes  Advancement: @ goal    I & O (24h):    Intake/Output Summary (Last 24 hours) at 3/23/2023 0507  Last data filed at 3/23/2023 0500  Gross per 24 hour   Intake 2137.95 ml   Output 1400 ml   Net 737.95 ml        Restraints: No    Significant Dates:  Post Op Date: N/A  Rescue Date: N/A  Imaging/ Diagnostics: N/A    Noteworthy Labs:  none    COVID Test: (--)  CBC/Anemia Labs: Coags:    Recent Labs   Lab 03/18/23  1106 03/18/23  1410 03/19/23  0413 03/22/23  0450 03/23/23  0332   WBC 10.64  --    < > 6.14 10.27  10.27   HGB 7.6*  --    < > 7.5* 7.1*  7.1*   HCT 24.5*  --    < > 23.1* 21.2*  21.2*     --    < > 199 185  185   MCV 91  --    < > 91 90  90   RDW 14.0  --    < > 15.2* 15.1*  15.1*   IRON  --  57  --   --   --    FERRITIN 84  --   --   --   --    FOLATE  --  16.5  --   --   --    [id number]  --  1079*  --   --   --     < > = values in this interval not displayed.    Recent Labs   Lab 03/18/23  1106 03/20/23  1744   INR 1.0 1.0        Chemistries:   Recent Labs   Lab 03/18/23  1106 03/19/23  0413 03/20/23  0205 03/21/23  0242 03/22/23  0233 03/23/23  0332    144   < > 139 137  --    K 4.6 4.8   < > 3.7 4.2  --     109   < > 102 97  --    CO2 32* 29   < > 31* 33*  --    BUN 50* 44*   < > 19 24*  --    CREATININE 0.7 0.7   < > 0.7 0.8  --    CALCIUM 9.2 8.2*   < > 8.3* 8.1*  --    PROT 6.7 5.5*  --   --   --   --    BILITOT 0.2 0.4  --   --   --   --    ALKPHOS 90 65  --   --   --   --    ALT 16 15  --   --   --   --    AST 16 22  --   --   --   --    MG  --   --    < > 1.8 1.9 2.1    < > = values in this interval not displayed.        Cardiac Enzymes: Ejection Fractions:    Recent Labs     03/20/23  1642   TROPONINI 3.416*    EF   Date Value Ref Range Status   03/20/2023 60 % Final        POCT Glucose: HbA1c:    No results for input(s): POCTGLUCOSE in the last 168 hours. Hemoglobin A1C   Date Value Ref Range Status   09/27/2022 6.3 (H)  4.0 - 5.6 % Final     Comment:     ADA Screening Guidelines:  5.7-6.4%  Consistent with prediabetes  >or=6.5%  Consistent with diabetes    High levels of fetal hemoglobin interfere with the HbA1C  assay. Heterozygous hemoglobin variants (HbS, HgC, etc)do  not significantly interfere with this assay.   However, presence of multiple variants may affect accuracy.             ICU LOS 3d 16h  Level of Care: Critical Care for vent overnight    Chart Check: 12 HR Done  Shift Summary/Plan for the shift: Pt switched onto vent shortly after 2200, received night time meds including his anxiety prn and a pain prn and then appears to have rested quite comfortably throughout night. SR in 60's when asleep, was tachy 100s-110s when awake as he is relatively anxious. Afebrile, VS stable, good UOP. No acute events.

## 2023-03-23 NOTE — RESPIRATORY THERAPY
Patient remains on aerosol trach collar FI02 40% at this time. Ventilator on standby. Will be placed for nightly use.

## 2023-03-23 NOTE — CARE UPDATE
Patient brought to cath lab. Unable to lie flat on table without respiratory distress and desaturation. Will postpone LHC until he is able to lie flat. Continue medical Rx with ASA/plavix. Will not restart heparin. Continue to monitor anemia    With further suctioning and mild head elevation  Saturations have been stable. Patient would like to proceed

## 2023-03-23 NOTE — ASSESSMENT & PLAN NOTE
- cardiology consulted and following   - Echo showed EF 60%, inferobasal hypokinesis, indeterminate diastolic function  - MDT currently attempting to optimize for possible cardiac cath during this admission vs outpt  - pt agreeable to any recommended cardiac procedures at this time   - noted; management per hospital primary, PCCM, and Cardiology

## 2023-03-23 NOTE — SUBJECTIVE & OBJECTIVE
Past Medical History:   Diagnosis Date    Cancer     skin to Rt forearm and face    COPD (chronic obstructive pulmonary disease)     Hyperlipidemia     Hypertension     Pseudoaneurysm      Past Surgical History:   Procedure Laterality Date    ABDOMINAL SURGERY      stents placed in liver and large intestines, per patient    CAROTID STENT      COLONOSCOPY N/A 09/27/2022    Procedure: COLONOSCOPY;  Surgeon: Donnie Peterson MD;  Location: [facility] ENDO (2ND FLR);  Service: Endoscopy;  Laterality: N/A;    COLONOSCOPY N/A 09/30/2022    Procedure: COLONOSCOPY;  Surgeon: Joy Cabrera MD;  Location: [facility] ENDO (2ND FLR);  Service: Endoscopy;  Laterality: N/A;    CORONARY STENT PLACEMENT  01/2000    DISSECTION OF NECK Bilateral 01/04/2022    Procedure: DISSECTION, NECK;  Surgeon: Naeem Smalls MD;  [facility];  Service: ENT;  Laterality: Bilateral;    ESOPHAGOGASTRODUODENOSCOPY N/A 09/27/2022    Procedure: EGD (ESOPHAGOGASTRODUODENOSCOPY);  Surgeon: Donnie Peterson MD;  Location: [facility] ENDO (2ND FLR);  Service: Endoscopy;  Laterality: N/A;    ESOPHAGOGASTRODUODENOSCOPY N/A 09/30/2022    Procedure: EGD (ESOPHAGOGASTRODUODENOSCOPY);  Surgeon: Joy Cabrera MD;  Location: [facility] ENDO (2ND FLR);  Service: Endoscopy;  Laterality: N/A;    ESOPHAGOGASTRODUODENOSCOPY W/ PEG N/A 01/04/2022    Procedure: EGD, WITH PEG TUBE INSERTION;  Surgeon: Anthony Calabrese MD;  [facility];  Service: General;  Laterality: N/A;    EYE SURGERY      Cataract bilateral    femoral stents      bilateral    FLAP PROCEDURE N/A 01/03/2022    Procedure: CREATION, FREE FLAP;  Surgeon: Naeem Smalls MD;  [facility];  Service: ENT;  Laterality: N/A;    FLAP PROCEDURE Right 01/04/2022    Procedure: CREATION, FREE FLAP;  Surgeon: Stacey Conde MD;  [facility];  Service: ENT;  Laterality: Right;  Ischemic start 1203  Ischemic stop 1353    GLOSSECTOMY N/A 01/04/2022    Procedure: GLOSSECTOMY;   Surgeon: Naeem Smalls MD;  Location: [facility] OR [address] [place] [address];  Service: ENT;  Laterality: N/A;    RADICAL NECK DISSECTION Left 2022    Procedure: DISSECTION, NECK, RADICAL;  Surgeon: Naeem Smalls MD;  [facility];  Service: ENT;  Laterality: Left;    SKIN BIOPSY      SKIN CANCER EXCISION      TRACHEOTOMY N/A 2022    Procedure: TRACHEOTOMY;  Surgeon: Naeem Smalls MD;  [facility];  Service: ENT;  Laterality: N/A;    VASCULAR SURGERY       Review of patient's allergies indicates:  No Known Allergies    Medications:  Continuous Infusions:   sodium chloride 0.9% 100 mL/hr at 23 1648    nitroGLYCERIN 10 mcg/min (23 0600)    NORepinephrine bitartrate-D5W Stopped (23 06)     Scheduled Meds:   aspirin  81 mg Oral Daily    [START ON 3/24/2023] atorvastatin  80 mg Per G Tube Daily    ceFEPime (MAXIPIME) IVPB  2 g Intravenous Q8H    clopidogreL  75 mg Oral Daily    ferrous sulfate  1 tablet Per G Tube Daily    furosemide  40 mg Per G Tube BID    glycopyrrolate  1 mg Per G Tube TID    LIDOcaine  2 patch Transdermal Q24H    melatonin  9 mg Per G Tube Nightly    mupirocin   Nasal BID    polyethylene glycol  17 g Oral BID    predniSONE  50 mg Per G Tube Daily    sodium chloride 0.9%  10 mL Intravenous Q6H     PRN Meds:sodium chloride, acetaminophen, acetaminophen, albuterol-ipratropium, fentaNYL, guaiFENesin 100 mg/5 ml, HYDROcodone-acetaminophen, hydrOXYzine HCL, naloxone, nitroGLYCERIN, ondansetron, oxyCODONE, Flushing PICC Protocol **AND** sodium chloride 0.9% **AND** sodium chloride 0.9%, Pharmacy to dose Vancomycin consult **AND** vancomycin - pharmacy to dose    Family History    None       Tobacco Use    Smoking status: Former     Packs/day: 2.00     Years: 40.00     Pack years: 80.00     Types: Cigarettes     Start date: 1963     Quit date: 2018     Years since quittin.9    Smokeless tobacco: Never    Tobacco comments:     3/3 ppd x  40 yrs. Currently 3-4 cigarettes daily .He is trying  to quit. Is using a Vapor cigarettes  2-3 x's a day.   Substance and Sexual Activity    Alcohol use: Not Currently    Drug use: No    Sexual activity: Not Currently       Review of Systems   Unable to perform ROS: Acuity of condition   Objective:     Vital Signs (Most Recent):  Temp: 97.2 °F (36.2 °C) (03/23/23 1100)  Pulse: 77 (03/23/23 1100)  Resp: (!) 29 (03/23/23 1642)  BP: 120/66 (03/23/23 1100)  SpO2: 98 % (03/23/23 1100)   Vital Signs (24h Range):  Temp:  [97.2 °F (36.2 °C)-97.8 °F (36.6 °C)] 97.2 °F (36.2 °C)  Pulse:  [] 77  Resp:  [13-29] 29  SpO2:  [95 %-100 %] 98 %  BP: ()/(51-98) 120/66     Weight: 68 kg (150 lb)  Body mass index is 22.81 kg/m².    Physical Exam  Constitutional:       General: He is not in acute distress.     Appearance: He is ill-appearing.   HENT:      Head: Atraumatic.      Comments: History of facial/oral reconstruction   Pulmonary:      Effort: Pulmonary effort is normal.      Comments: Trach, on vent  Abdominal:      Comments: PEG   Musculoskeletal:      Right lower leg: No edema.      Left lower leg: No edema.   Neurological:      Mental Status: He is alert and oriented to person, place, and time.       Advance Care Planning   Advance Directives:   Living Will: No    LaPOST: No    Do Not Resuscitate Status: No    Medical Power of : No    Goals of Care: Ongoing attempts for GOC discussion.  See ACP       Significant Labs: All pertinent labs within the past 24 hours have been reviewed.  CBC:   Recent Labs   Lab 03/23/23  0332   WBC 10.27  10.27   HGB 7.1*  7.1*   HCT 21.2*  21.2*   MCV 90  90     185     BMP:  Recent Labs   Lab 03/23/23  0332   *   *   K 3.8   CL 94*   CO2 32*   BUN 33*   CREATININE 0.7   CALCIUM 8.0*   MG 2.1     LFT:  Lab Results   Component Value Date    AST 22 03/19/2023    ALKPHOS 65 03/19/2023    BILITOT 0.4 03/19/2023     Albumin:   Albumin   Date Value Ref  Range Status   03/23/2023 2.2 (L) 3.5 - 5.2 g/dL Final     Protein:   Total Protein   Date Value Ref Range Status   03/19/2023 5.5 (L) 6.0 - 8.4 g/dL Final     Lactic acid:   Lab Results   Component Value Date    LACTATE 1.8 03/19/2023    LACTATE 0.8 05/14/2022       Significant Imaging: I have reviewed all pertinent imaging results/findings within the past 24 hours.

## 2023-03-23 NOTE — ASSESSMENT & PLAN NOTE
- PEG for feeding and meds; tube feeds typically well tolerated and no difficulty with home management by wife   - noted; management per hospital primary

## 2023-03-23 NOTE — ASSESSMENT & PLAN NOTE
- trach; now off vent   - PCCM consulted/following   - noted; management per hospital primary and PCCM

## 2023-03-23 NOTE — PROCEDURES
"Fareed Richard Jr. is a 73 y.o. male patient.    Temp: 97.2 °F (36.2 °C) (03/23/23 1100)  Pulse: 77 (03/23/23 1100)  Resp: (!) 27 (03/23/23 1100)  BP: 120/66 (03/23/23 1100)  SpO2: 98 % (03/23/23 1100)  Weight: 68 kg (150 lb) (03/20/23 1130)  Height: 5' 8" (172.7 cm) (03/18/23 1747)       Procedures    [facility]   Dr Benitez  Pre-op Dx NSTEMI  Post-op Dx same  Specimen none  EBL < 50 cc    3/23/23 [facility] - EDP 13, distal LM 30%, mild LAD 90% bifurcation lesion with D1, Cx mid 80%, RCA moderate diffuse disease with long 70% and some left to right collateral. All vessels heavily calcified    Reviewed with Dr Malone - poor candidate for CABG. Will discuss staged PCI of LAD/Cx if able to tolerate DAPT without further pulmonary bleeding    3/23/2023    "

## 2023-03-23 NOTE — ASSESSMENT & PLAN NOTE
Patient with Hypoxic Respiratory failure which is Acute on chronic.  he is on home oxygen at 2-3 LPM. Supplemental oxygen was provided and noted- Vent Mode: PS/CPAP  Oxygen Concentration (%):  [40] 40  Resp Rate Total:  [15 br/min-18 br/min] 18 br/min  PEEP/CPAP:  [8 cmH20] 8 cmH20  Pressure Support:  [5 cmH20] 5 cmH20  Mean Airway Pressure:  [9.8 cmH20-9.9 cmH20] 9.8 cmH20.   Signs/symptoms of respiratory failure include- increased work of breathing and hemoptysis. Contributing diagnoses includes - Aspiration and Pneumonia Labs and images were reviewed. Patient Has not had a recent ABG. Will treat underlying causes and adjust management of respiratory failure as follows-     Hemoptysis with increased bilateral infiltrates on chest CTA in the setting of likely aspiration (attempting swallow training with Speech) and chronic aspirin/Plavix.  Although procalcitonin is normal, imaging and history warrant treatment with antibiotics for presumed nosocomial pneumonia.  Moved to ICU due to labile BP and increased oxygen requirements.  After receiving one unit of packed red blood cells, he looks much better and BP is improved.    · Treat for pneumonia (nosocomial pathogens)  · Nebulized bronchodilators given COPD/emphysema changes  · Trach changed to 6.0 cuffed shiley on 3/22 to facilitate ventilatory support after likely aspiration event  · Sputum ordered, follow up results  · Titrate oxygen as needed; wean back to trach collar as tolerated  · No further bleeding noted, no need to continue TXA nebulizer treatments for hemoptysis. Hgb remains stable on heparin gtt.   · Tolerated DAPT.  · Cuff deflated for comfort.  Ok to bypass bipap at night.

## 2023-03-23 NOTE — ASSESSMENT & PLAN NOTE
- anxiety which worsens respiratory symptoms, sometimes to the point of acute distress   - pt and wife confirm symptoms of anxiety affecting daily life; takes hydroxyzine HS for this and related insomnia; has required additional day time doses of Hydroxyzine to assist with anxiety symptoms  - hydroxyzine now available PRN through out the day as well; advised administration is timed to allow for HS dosing to assist with insomnia   - also discussed symptoms depression; recommendation for SSRI to help manage both anxiety and depression symptoms; will allow for cardiac procedure and ensure pt is no longer experiencing/nausea or vomiting before starting   - discussed role of home/clinic palliative in additional management of medication regimen and symptoms   - plan discussed with hospital primary

## 2023-03-23 NOTE — PROGRESS NOTES
[facility] - Intensive Care  Palliative Medicine  Progress Note    Patient Name: Fareed Richard Jr.  MRN: [id number]  Admission Date: 3/18/2023  Hospital Length of Stay: 4 days  Code Status: Full Code   Attending Provider: Luiz Patel MD  Consulting Provider: Trupti Beck NP  Primary Care Physician: Kodi Tubbs MD  Principal Problem:Hypotension    Patient information was obtained from patient, spouse/SO and primary team.      Assessment/Plan:   Advance Care Planning     Psychiatric  Anxiety  - anxiety which worsens respiratory symptoms, sometimes to the point of acute distress   - pt and wife confirm symptoms of anxiety affecting daily life; takes hydroxyzine HS for this and related insomnia; has required additional day time doses of Hydroxyzine to assist with anxiety symptoms  - hydroxyzine now available PRN through out the day as well; advised administration is timed to allow for HS dosing to assist with insomnia   - also discussed symptoms depression; recommendation for SSRI to help manage both anxiety and depression symptoms; will allow for cardiac procedure and ensure pt is no longer experiencing/nausea or vomiting before starting   - discussed role of home/clinic palliative in additional management of medication regimen and symptoms   - plan discussed with hospital primary       ENT  Tracheostomy present  - see respiratory distress and squamous cell CA   - was initially admitted with significant bleeding to trach site which is now resolved, per notes and MDT discussion  - now off vent   - pt has frustrations with limited ability to verbalize/communicate; can become frustrated/anxious/dyspneic at times related to this; writes in a notebook often to support communication   - noted; management per hospital primary and PCCM     Pulmonary  Acute on chronic respiratory failure with hypoxia  - trach; now off vent   - PCCM consulted/following   - noted; management per hospital primary and PCCM      Cardiac/Vascular  Coronary artery disease involving native coronary artery of native heart without angina pectoris  - cardiology consulted and following   - Echo showed EF 60%, inferobasal hypokinesis, indeterminate diastolic function  - nitro drip weaning; pt denying any continued angina/pain   - respiratory status improved; Cardiology now with plan to proceed with cardiac cath procedure today  - pt agreeable to any recommended cardiac procedures at this time   - noted; management per hospital primary, PCCM, and Cardiology     Oncology  Squamous cell cancer of tongue  -Diagnosed 12/15/21 via FNA; Underwent total glossectomy, bilateral neck dissection, bilateral cervical facial flaps and anterolateral thigh flap reconstruction of glossectomy defect; Completed adjuvant chemoradiation  -Followed by oncology and ENT outpt. No longer on chemo or radiation.   - has a trach since above treatment   - noted; management per hospital primary     GI  PEG (percutaneous endoscopic gastrostomy) status  - PEG for feeding and meds; tube feeds typically well tolerated and no difficulty with home management by wife   - noted; management per hospital primary     Palliative Care  ACP (advance care planning)  Palliative Encounter  Date: 3/23/2023  - interval chart reviewed in detail; discussed pt with ICU MDT Rounds   - met with pt and wife at bedside for planned GOC/ACP discussion; pt with improved calmness related to this discussion; reassured pt at anytime if there was a topic he did not wish to discuss, or wished to stop meeting to let me know   - introduction to palliative medicine and role in current/future care provided to pt's wife  - discussed previously stated goals (as discussed with Dr. Escoto); pt confirms confirmed continued desire for full code and full treatment; reassured these wishes are and will be respected; encouraged that if at anytime those wishes were to change that it could be discussed further at that time    - allowed pt and wife to share long road of cancer treatment and how this affects current GOC   - plan now in place for cardiac cath procedure today; briefly discussed  - discussed pt's symptoms of anxiety; wife also provided concerns of pt having a more depressed mood; validated anxiety and depression symptoms given pt's condition and other symptoms; discussed correlation of anxiety and respiratory symptoms; discussed options for anxiety management (see anxiety)   - pt's wife shared pt's fear of requiring a nursing home as likely source of anxiety related to previous visits; pt advocates that he would never wish to live in a  NH; wife is primary care giver and confirms they have a lot of equipment at home to help care for pt, and she is well versed on his care needs; bedside RN shared wife's concern for needed feeding supplies and requesting CM/SW assistance prior to discharge; CM/SW consult placed   - recommended home palliative or palliative clinic for continued palliative support, symptom management, and ACP discussions; they are agreeable to this; plan for follow up to determine if home palliative or clinic preferred  - emotional support provided; allowed time for questions/concerns, all addressed   - hospital primary updated    Palliative Consult  Date: 3/22/2023 (date corrections to initial consult)   - consult received; interval chart reviewed in detail; discussed pt during ICU MDT rounds   - met with patient at ICU bedside; introduction to palliative medicine team and role in current care and admission  - initial consult on 3/21, but pt's condition did not allow for GOC discussion, post poned as to not cause increased anxiety and respiratory compromise; previous detailed GOC/ACP discussion with, then primary, Dr. Escoto   - Assessed pt and discussed plan to have GOC/ACP discussion with wife present later in the day  - as time for planned discussion neared, pt became very anxious of pending discussion;  "discussed role of palliative and assured that I do not wish to cause pt anxiety about discussions, and he can request to not discuss at all; explained my intentions of meeting and that I would not try to persuade him into any plans that do not align with his GOC   - validated all the hard work and things pt has overcome in his cancer treatment   - pt seemed very relieved by above discussion and agreeable for further meeting on 3/23, with wife present   - emotional support provided; allowed time for questions/concerns, all addressed   - MDT updated, and ongoing communication with MDT         I will follow-up with patient. Please contact us if you have any additional questions.    Subjective:     Chief Complaint:   Chief Complaint   Patient presents with    Hemoptysis     Ems called to 72yo male that wife noticed was having blood y sputum in his trach on Wednesday. Went thursdsay to have a trach change and the dr was unale to scope his mouth above the trach due to him gagging but did change the trach. Continued to have the pink sputum until 0300 today and it had bright red blood from trach. Denied any pain or SOB       HPI:   From H&P: " Fareed Richard Jr. 73 y.o. 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  Per his wife 4 days ago he began to have pink tinged sputum via his trach.  He was seen by his ENT 2 days ago with a scope exam and a trach exchange without evidence of bleeding.  This morning at 3:00 a.m. he developed bright red blood via trach which resolved without intervention.  It is since not recurred.  He takes a daily aspirin.  He receives all medications via G-tube.  His tube feeding regimen consists of 6 cans of Isosource daily with 120 cc free water boluses.  He denies fever chest pain shortness of breath nausea vomiting abdominal pain leg swelling syncope dizziness dysuria melena " "hematuria hematemesis.     In the ED, hemoglobin 7.6 INR 1.0 BUN 50 creatinine 0.7  troponin negative BRCA negative O positive type and screen chest x-ray without detrimental change hypotensive to 85/54."     Palliative medicine consulted for goals of care and advance care planning; Met with pt at bedside; for details of visit, see advance care planning section of plan.         Hospital Course:  No notes on file    Past Medical History:   Diagnosis Date    Cancer     skin to Rt forearm and face    COPD (chronic obstructive pulmonary disease)     Hyperlipidemia     Hypertension     Pseudoaneurysm      Past Surgical History:   Procedure Laterality Date    ABDOMINAL SURGERY      stents placed in liver and large intestines, per patient    CAROTID STENT      COLONOSCOPY N/A 09/27/2022    Procedure: COLONOSCOPY;  Surgeon: Donnie Peterson MD;  Location: [address]);  Service: Endoscopy;  Laterality: N/A;    COLONOSCOPY N/A 09/30/2022    Procedure: COLONOSCOPY;  Surgeon: Joy Cabrera MD;  Location: [address]);  Service: Endoscopy;  Laterality: N/A;    CORONARY STENT PLACEMENT  01/2000    DISSECTION OF NECK Bilateral 01/04/2022    Procedure: DISSECTION, NECK;  Surgeon: Naeem Smalls MD;  Location: [address];  Service: ENT;  Laterality: Bilateral;    ESOPHAGOGASTRODUODENOSCOPY N/A 09/27/2022    Procedure: EGD (ESOPHAGOGASTRODUODENOSCOPY);  Surgeon: Donnie Peterson MD;  Location: [address]);  Service: Endoscopy;  Laterality: N/A;    ESOPHAGOGASTRODUODENOSCOPY N/A 09/30/2022    Procedure: EGD (ESOPHAGOGASTRODUODENOSCOPY);  Surgeon: Joy Cabrera MD;  Location: [address]);  Service: Endoscopy;  Laterality: N/A;    ESOPHAGOGASTRODUODENOSCOPY W/ PEG N/A 01/04/2022    Procedure: EGD, WITH PEG TUBE INSERTION;  Surgeon: Anthony Calabrese MD;  Location: [facility] OR [address] [place] [address];  Service: General;  Laterality: N/A;    EYE SURGERY      Cataract bilateral    femoral stents      " bilateral    FLAP PROCEDURE N/A 01/03/2022    Procedure: CREATION, FREE FLAP;  Surgeon: Naeem Smalls MD;  Location: [facility] OR 2ND FLR;  Service: ENT;  Laterality: N/A;    FLAP PROCEDURE Right 01/04/2022    Procedure: CREATION, FREE FLAP;  Surgeon: Stacey Conde MD;  Location: [facility] OR 2ND FLR;  Service: ENT;  Laterality: Right;  Ischemic start 1203  Ischemic stop 1353    GLOSSECTOMY N/A 01/04/2022    Procedure: GLOSSECTOMY;  Surgeon: Naeem Smalls MD;  Location: [facility] OR 2ND FLR;  Service: ENT;  Laterality: N/A;    RADICAL NECK DISSECTION Left 01/03/2022    Procedure: DISSECTION, NECK, RADICAL;  Surgeon: Naeem Smalls MD;  Location: [facility] OR 2ND FLR;  Service: ENT;  Laterality: Left;    SKIN BIOPSY      SKIN CANCER EXCISION      TRACHEOTOMY N/A 01/04/2022    Procedure: TRACHEOTOMY;  Surgeon: Naeem Smalls MD;  Location: [facility] OR 2ND FLR;  Service: ENT;  Laterality: N/A;    VASCULAR SURGERY       Review of patient's allergies indicates:  No Known Allergies    Medications:  Continuous Infusions:   sodium chloride 0.9% 100 mL/hr at 03/23/23 1648    nitroGLYCERIN 10 mcg/min (03/23/23 0600)    NORepinephrine bitartrate-D5W Stopped (03/22/23 0627)     Scheduled Meds:   aspirin  81 mg Oral Daily    [START ON 3/24/2023] atorvastatin  80 mg Per G Tube Daily    ceFEPime (MAXIPIME) IVPB  2 g Intravenous Q8H    clopidogreL  75 mg Oral Daily    ferrous sulfate  1 tablet Per G Tube Daily    furosemide  40 mg Per G Tube BID    glycopyrrolate  1 mg Per G Tube TID    LIDOcaine  2 patch Transdermal Q24H    melatonin  9 mg Per G Tube Nightly    mupirocin   Nasal BID    polyethylene glycol  17 g Oral BID    predniSONE  50 mg Per G Tube Daily    sodium chloride 0.9%  10 mL Intravenous Q6H     PRN Meds:sodium chloride, acetaminophen, acetaminophen, albuterol-ipratropium, fentaNYL, guaiFENesin 100 mg/5 ml, HYDROcodone-acetaminophen, hydrOXYzine HCL, naloxone, nitroGLYCERIN, ondansetron,  oxyCODONE, Flushing PICC Protocol **AND** sodium chloride 0.9% **AND** sodium chloride 0.9%, Pharmacy to dose Vancomycin consult **AND** vancomycin - pharmacy to dose    Family History    None       Tobacco Use    Smoking status: Former     Packs/day: 2.00     Years: 40.00     Pack years: 80.00     Types: Cigarettes     Start date: 1963     Quit date: 2018     Years since quittin.9    Smokeless tobacco: Never    Tobacco comments:     3/3 ppd x 40 yrs. Currently 3-4 cigarettes daily .He is trying  to quit. Is using a Vapor cigarettes  2-3 x's a day.   Substance and Sexual Activity    Alcohol use: Not Currently    Drug use: No    Sexual activity: Not Currently       Objective:     Vital Signs (Most Recent):  Temp: 97.2 °F (36.2 °C) (23 1630)  Pulse: 87 (23 1700)  Resp: 15 (23 1700)  BP: (!) 146/79 (23 1700)  SpO2: 100 % (23 1700)   Vital Signs (24h Range):  Temp:  [97.2 °F (36.2 °C)-97.8 °F (36.6 °C)] 97.2 °F (36.2 °C)  Pulse:  [] 87  Resp:  [13-29] 15  SpO2:  [95 %-100 %] 100 %  BP: ()/(51-98) 146/79     Weight: 68 kg (150 lb)  Body mass index is 22.81 kg/m².    Physical Exam  Constitutional:       General: He is not in acute distress.     Appearance: He is ill-appearing.   HENT:      Head: Atraumatic.      Comments: History of facial/oral reconstruction   Pulmonary:      Effort: Pulmonary effort is normal.      Comments: Trach  Abdominal:      Comments: PEG   Musculoskeletal:      Right lower leg: No edema.      Left lower leg: No edema.   Neurological:      Mental Status: He is alert and oriented to person, place, and time.   Psychiatric:         Mood and Affect: Mood is anxious.      Comments: Frustration with difficulties with communication        Advance Care Planning   Advance Directives:   Living Will: No    [place]: No    Do Not Resuscitate Status: No    Medical Power of : No      Decision Making:  Patient answered questions and Family  answered questions  Goals of Care: The patient endorses that what is most important right now is to focus on spending time at home, remaining as independent as possible, symptom/pain control, and curative/life-prolongation (regardless of treatment burdens)    Accordingly, we have decided that the best plan to meet the patient's goals includes continuing with treatment       Significant Labs: All pertinent labs within the past 24 hours have been reviewed.  CBC:   Recent Labs   Lab 03/23/23 0332   WBC 10.27  10.27   HGB 7.1*  7.1*   HCT 21.2*  21.2*   MCV 90  90     185       BMP:  Recent Labs   Lab 03/23/23 0332   *   *   K 3.8   CL 94*   CO2 32*   BUN 33*   CREATININE 0.7   CALCIUM 8.0*   MG 2.1       LFT:  Lab Results   Component Value Date    AST 22 03/19/2023    ALKPHOS 65 03/19/2023    BILITOT 0.4 03/19/2023     Albumin:   Albumin   Date Value Ref Range Status   03/23/2023 2.2 (L) 3.5 - 5.2 g/dL Final     Protein:   Total Protein   Date Value Ref Range Status   03/19/2023 5.5 (L) 6.0 - 8.4 g/dL Final     Lactic acid:   Lab Results   Component Value Date    LACTATE 1.8 03/19/2023    LACTATE 0.8 05/14/2022       Significant Imaging: I have reviewed all pertinent imaging results/findings within the past 24 hours.    Total visit time:  81 minutes    > 50% of  35  min visit spent in chart review, face to face discussion of symptom assessment, coordination of care with other specialists, and discharge planning.    46 min ACP time spent: goals of care, emotional support, formulating and communicating prognosis, exploring burden/ benefit of various approaches of treatment.     Trupti Beck NP  Palliative Medicine  [facility] - Intensive Care

## 2023-03-23 NOTE — HPI
"From H&P: " Fareed Richard Jr. 73 y.o. 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  Per his wife 4 days ago he began to have pink tinged sputum via his trach.  He was seen by his ENT 2 days ago with a scope exam and a trach exchange without evidence of bleeding.  This morning at 3:00 a.m. he developed bright red blood via trach which resolved without intervention.  It is since not recurred.  He takes a daily aspirin.  He receives all medications via G-tube.  His tube feeding regimen consists of 6 cans of Isosource daily with 120 cc free water boluses.  He denies fever chest pain shortness of breath nausea vomiting abdominal pain leg swelling syncope dizziness dysuria melena hematuria hematemesis.     In the ED, hemoglobin 7.6 INR 1.0 BUN 50 creatinine 0.7  troponin negative BRCA negative O positive type and screen chest x-ray without detrimental change hypotensive to 85/54."     Palliative medicine consulted for goals of care and advance care planning; Met with pt at bedside; for details of visit, see advance care planning section of plan.     "

## 2023-03-23 NOTE — CONSULTS
[facility] - Intensive Care  Palliative Medicine  Consult Note    Patient Name: Fareed Richard Jr.  MRN: [id number]  Admission Date: 3/18/2023  Hospital Length of Stay: 4 days  Code Status: Full Code   Attending Provider: Luiz Patel MD  Consulting Provider: Trupti Beck NP  Primary Care Physician: Kodi Tubbs MD  Principal Problem:Hypotension    Patient information was obtained from patient, past medical records, ER records and primary team.      Consults  Assessment/Plan:   Advance Care Planning     ENT  Tracheostomy present  - see respiratory distress and squamous cell CA   - was initially admitted with significant bleeding to trach site which is now resolved, per notes and MDT discussion  - now on vent   - noted; management per hospital primary and PCCM     Pulmonary  Acute on chronic respiratory failure with hypoxia  - trach; transitioned to vent for support   - PCCM consulted/following   - noted; management per hospital primary and PCCM     Cardiac/Vascular  Coronary artery disease involving native coronary artery of native heart without angina pectoris  - cardiology consulted and following   - Echo showed EF 60%, inferobasal hypokinesis, indeterminate diastolic function  - MDT currently attempting to optimize for possible cardiac cath during this admission vs outpt  - pt agreeable to any recommended cardiac procedures at this time   - noted; management per hospital primary, PCCM, and Cardiology     Oncology  Squamous cell cancer of tongue  -Diagnosed 12/15/21 via FNA; Underwent total glossectomy, bilateral neck dissection, bilateral cervical facial flaps and anterolateral thigh flap reconstruction of glossectomy defect; Completed adjuvant chemoradiation  -Followed by oncology and ENT outpt. No longer on chemo or radiation.   - has a trach since above treatment   - noted; management per hospital primary     GI  PEG (percutaneous endoscopic gastrostomy) status  - PEG for feeding and meds; tube feeds  "typically well tolerated and no difficulty with home management by wife   - noted; management per hospital primary     Palliative Care  ACP (advance care planning)  Palliative Consult  Date: 3/23/2023  - consult received; interval chart reviewed in detail; discussed pt during ICU MDT rounds   - met with patient at ICU bedside; introduction to palliative medicine team and role in current care and admission  - initial consult on 3/22, but pt's condition did not allow for GOC discussion, post poned as to not cause increased anxiety and respiratory compromise; previous detailed GOC/ACP discussion with, then primary, Dr. Escoto   - Assessed pt and discussed plan to have GOC/ACP discussion with wife present later in the day  - as time for planned discussion neared, pt became very anxious of pending discussion; discussed role of palliative and assured that I do not wish to cause pt anxiety about discussions, and he can request to not discuss at all; explained my intentions of meeting and that I would not try to persuade him into any plans that do not align with his GOC   - validated all the hard work and things pt has overcome in his cancer treatment   - pt seemed very relieved by above discussion and agreeable for further meeting on 3/23, with wife present   - emotional support provided; allowed time for questions/concerns, all addressed   - MDT updated, and ongoing communication with MDT         Thank you for your consult. I will follow-up with patient. Please contact us if you have any additional questions.    Subjective:     HPI:   From H&P: " Fareed Richard Jr. 73 y.o. 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  Per his wife 4 days ago he began to have pink tinged sputum via his trach.  He was seen by his ENT 2 days ago with a scope exam and a trach exchange without evidence of bleeding.  This " "morning at 3:00 a.m. he developed bright red blood via trach which resolved without intervention.  It is since not recurred.  He takes a daily aspirin.  He receives all medications via G-tube.  His tube feeding regimen consists of 6 cans of Isosource daily with 120 cc free water boluses.  He denies fever chest pain shortness of breath nausea vomiting abdominal pain leg swelling syncope dizziness dysuria melena hematuria hematemesis.     In the ED, hemoglobin 7.6 INR 1.0 BUN 50 creatinine 0.7  troponin negative BRCA negative O positive type and screen chest x-ray without detrimental change hypotensive to 85/54."     Palliative medicine consulted for goals of care and advance care planning; Met with pt at bedside; for details of visit, see advance care planning section of plan.         Hospital Course:  No notes on file    Past Medical History:   Diagnosis Date    Cancer     skin to Rt forearm and face    COPD (chronic obstructive pulmonary disease)     Hyperlipidemia     Hypertension     Pseudoaneurysm      Past Surgical History:   Procedure Laterality Date    ABDOMINAL SURGERY      stents placed in liver and large intestines, per patient    CAROTID STENT      COLONOSCOPY N/A 09/27/2022    Procedure: COLONOSCOPY;  Surgeon: Donnie Peterson MD;  Location: [facility] ([address] [place] [address]);  Service: Endoscopy;  Laterality: N/A;    COLONOSCOPY N/A 09/30/2022    Procedure: COLONOSCOPY;  Surgeon: Joy Cabrera MD;  Location: [facility] ([address] [place] [address]);  Service: Endoscopy;  Laterality: N/A;    CORONARY STENT PLACEMENT  01/2000    DISSECTION OF NECK Bilateral 01/04/2022    Procedure: DISSECTION, NECK;  Surgeon: Naeem Smalls MD;  Location: [facility] OR [address] [place] [address];  Service: ENT;  Laterality: Bilateral;    ESOPHAGOGASTRODUODENOSCOPY N/A 09/27/2022    Procedure: EGD (ESOPHAGOGASTRODUODENOSCOPY);  Surgeon: Donnie Peterson MD;  Location: [facility] ([address] [place] [address]);  Service: Endoscopy;  Laterality: N/A;    ESOPHAGOGASTRODUODENOSCOPY N/A " 09/30/2022    Procedure: EGD (ESOPHAGOGASTRODUODENOSCOPY);  Surgeon: Joy Cabrera MD;  Location: [facility] ENDO (2ND FLR);  Service: Endoscopy;  Laterality: N/A;    ESOPHAGOGASTRODUODENOSCOPY W/ PEG N/A 01/04/2022    Procedure: EGD, WITH PEG TUBE INSERTION;  Surgeon: Anthony Calabrese MD;  Location: [facility] OR 2ND FLR;  Service: General;  Laterality: N/A;    EYE SURGERY      Cataract bilateral    femoral stents      bilateral    FLAP PROCEDURE N/A 01/03/2022    Procedure: CREATION, FREE FLAP;  Surgeon: Naeem Smalls MD;  Location: [facility] OR 2ND FLR;  Service: ENT;  Laterality: N/A;    FLAP PROCEDURE Right 01/04/2022    Procedure: CREATION, FREE FLAP;  Surgeon: Stacey Conde MD;  Location: [facility] OR 2ND FLR;  Service: ENT;  Laterality: Right;  Ischemic start 1203  Ischemic stop 1353    GLOSSECTOMY N/A 01/04/2022    Procedure: GLOSSECTOMY;  Surgeon: Naeem Smalls MD;  Location: [facility] OR 2ND FLR;  Service: ENT;  Laterality: N/A;    RADICAL NECK DISSECTION Left 01/03/2022    Procedure: DISSECTION, NECK, RADICAL;  Surgeon: Naeem Smalls MD;  Location: [facility] OR [facility] FLR;  Service: ENT;  Laterality: Left;    SKIN BIOPSY      SKIN CANCER EXCISION      TRACHEOTOMY N/A 01/04/2022    Procedure: TRACHEOTOMY;  Surgeon: Naeem Smalls MD;  [facility];  Service: ENT;  Laterality: N/A;    VASCULAR SURGERY       Review of patient's allergies indicates:  No Known Allergies    Medications:  Continuous Infusions:   sodium chloride 0.9% 100 mL/hr at 03/23/23 1648    nitroGLYCERIN 10 mcg/min (03/23/23 0600)    NORepinephrine bitartrate-D5W Stopped (03/22/23 0627)     Scheduled Meds:   aspirin  81 mg Oral Daily    [START ON 3/24/2023] atorvastatin  80 mg Per G Tube Daily    ceFEPime (MAXIPIME) IVPB  2 g Intravenous Q8H    clopidogreL  75 mg Oral Daily    ferrous sulfate  1 tablet Per G Tube Daily    furosemide  40 mg Per G Tube BID    glycopyrrolate  1 mg Per G Tube TID    LIDOcaine  2  patch Transdermal Q24H    melatonin  9 mg Per G Tube Nightly    mupirocin   Nasal BID    polyethylene glycol  17 g Oral BID    predniSONE  50 mg Per G Tube Daily    sodium chloride 0.9%  10 mL Intravenous Q6H     PRN Meds:sodium chloride, acetaminophen, acetaminophen, albuterol-ipratropium, fentaNYL, guaiFENesin 100 mg/5 ml, HYDROcodone-acetaminophen, hydrOXYzine HCL, naloxone, nitroGLYCERIN, ondansetron, oxyCODONE, Flushing PICC Protocol **AND** sodium chloride 0.9% **AND** sodium chloride 0.9%, Pharmacy to dose Vancomycin consult **AND** vancomycin - pharmacy to dose    Family History    None       Tobacco Use    Smoking status: Former     Packs/day: 2.00     Years: 40.00     Pack years: 80.00     Types: Cigarettes     Start date: 1963     Quit date: 2018     Years since quittin.9    Smokeless tobacco: Never    Tobacco comments:     3/3 ppd x 40 yrs. Currently 3-4 cigarettes daily .He is trying  to quit. Is using a Vapor cigarettes  2-3 x's a day.   Substance and Sexual Activity    Alcohol use: Not Currently    Drug use: No    Sexual activity: Not Currently       Review of Systems   Unable to perform ROS: Acuity of condition   Objective:     Vital Signs (Most Recent):  Temp: 97.2 °F (36.2 °C) (23 1100)  Pulse: 77 (23 1100)  Resp: (!) 29 (23 1642)  BP: 120/66 (23 1100)  SpO2: 98 % (23 1100)   Vital Signs (24h Range):  Temp:  [97.2 °F (36.2 °C)-97.8 °F (36.6 °C)] 97.2 °F (36.2 °C)  Pulse:  [] 77  Resp:  [13-29] 29  SpO2:  [95 %-100 %] 98 %  BP: ()/(51-98) 120/66     Weight: 68 kg (150 lb)  Body mass index is 22.81 kg/m².    Physical Exam  Constitutional:       General: He is not in acute distress.     Appearance: He is ill-appearing.   HENT:      Head: Atraumatic.      Comments: History of facial/oral reconstruction   Pulmonary:      Effort: Pulmonary effort is normal.      Comments: Trach, on vent  Abdominal:      Comments: PEG   Musculoskeletal:       Right lower leg: No edema.      Left lower leg: No edema.   Neurological:      Mental Status: He is alert and oriented to person, place, and time.       Advance Care Planning   Advance Directives:   Living Will: No    LaPOST: No    Do Not Resuscitate Status: No    Medical Power of : No    Goals of Care: Ongoing attempts for GOC discussion.  See ACP       Significant Labs: All pertinent labs within the past 24 hours have been reviewed.  CBC:   Recent Labs   Lab 03/23/23 0332   WBC 10.27  10.27   HGB 7.1*  7.1*   HCT 21.2*  21.2*   MCV 90  90     185     BMP:  Recent Labs   Lab 03/23/23 0332   *   *   K 3.8   CL 94*   CO2 32*   BUN 33*   CREATININE 0.7   CALCIUM 8.0*   MG 2.1     LFT:  Lab Results   Component Value Date    AST 22 03/19/2023    ALKPHOS 65 03/19/2023    BILITOT 0.4 03/19/2023     Albumin:   Albumin   Date Value Ref Range Status   03/23/2023 2.2 (L) 3.5 - 5.2 g/dL Final     Protein:   Total Protein   Date Value Ref Range Status   03/19/2023 5.5 (L) 6.0 - 8.4 g/dL Final     Lactic acid:   Lab Results   Component Value Date    LACTATE 1.8 03/19/2023    LACTATE 0.8 05/14/2022       Significant Imaging: I have reviewed all pertinent imaging results/findings within the past 24 hours.      Total visit time: 70 minutes    > 50% of  70  min visit spent in chart review, face to face discussion of symptom assessment, coordination of care with other specialists, and discharge planning.    ACP time spent: goals of care, emotional support, formulating and communicating prognosis, exploring burden/ benefit of various approaches of treatment.     Trupti Beck NP  Palliative Medicine  [facility] - Intensive Care

## 2023-03-23 NOTE — PROCEDURES
"Fareed Richard Jr. is a 73 y.o. male patient.    Temp: 97.2 °F (36.2 °C) (03/23/23 1100)  Pulse: 77 (03/23/23 1100)  Resp: (!) 27 (03/23/23 1100)  BP: 120/66 (03/23/23 1100)  SpO2: 98 % (03/23/23 1100)  Weight: 68 kg (150 lb) (03/20/23 1130)  Height: 5' 8" (172.7 cm) (03/18/23 1747)       Procedures    Pre-sedation Assessment:    1. ASA Score: ASA 3 - Patient with moderate systemic disease with functional limitations  2. Mallampati Class: II (hard and soft palate, upper portion of tonsils anduvula visible)  3. Patient or family history of any reaction to anesthesia or sedation: No  4. Plan for Sedation: Moderate  5. H&P within 30 days of the procedure and updated within 24 hrs of admission or registration: Yes     3/23/2023    "

## 2023-03-23 NOTE — ASSESSMENT & PLAN NOTE
-Diagnosed 12/15/21 via FNA; Underwent total glossectomy, bilateral neck dissection, bilateral cervical facial flaps and anterolateral thigh flap reconstruction of glossectomy defect; Completed adjuvant chemoradiation  -Followed by oncology and ENT outpt. No longer on chemo or radiation.   - has a trach since above treatment   - noted; management per hospital primary

## 2023-03-23 NOTE — ASSESSMENT & PLAN NOTE
- trach; transitioned to vent for support   - PCCM consulted/following   - noted; management per hospital primary and PCCM

## 2023-03-23 NOTE — ASSESSMENT & PLAN NOTE
- cardiology consulted and following   - Echo showed EF 60%, inferobasal hypokinesis, indeterminate diastolic function  - nitro drip weaning; pt denying any continued angina/pain   - respiratory status improved; Cardiology now with plan to proceed with cardiac cath procedure today  - pt agreeable to any recommended cardiac procedures at this time   - noted; management per hospital primary, PCCM, and Cardiology

## 2023-03-24 LAB
ALLENS TEST: ABNORMAL
APTT PPP: 28.8 SEC (ref 21–32)
APTT PPP: 32.2 SEC (ref 21–32)
BASOPHILS # BLD AUTO: 0.01 K/UL (ref 0–0.2)
BASOPHILS NFR BLD: 0.1 % (ref 0–1.9)
DELSYS: ABNORMAL
DIFFERENTIAL METHOD: ABNORMAL
EOSINOPHIL # BLD AUTO: 0 K/UL (ref 0–0.5)
EOSINOPHIL NFR BLD: 0 % (ref 0–8)
ERYTHROCYTE [DISTWIDTH] IN BLOOD BY AUTOMATED COUNT: 15.1 % (ref 11.5–14.5)
FACT X PPP CHRO-ACNC: <0.1 IU/ML (ref 0.3–0.7)
FIO2: 40
FLOW: 10
HCO3 UR-SCNC: 41.6 MMOL/L (ref 24–28)
HCT VFR BLD AUTO: 24 % (ref 40–54)
HGB BLD-MCNC: 7.8 G/DL (ref 14–18)
IMM GRANULOCYTES # BLD AUTO: 0.06 K/UL (ref 0–0.04)
IMM GRANULOCYTES NFR BLD AUTO: 0.6 % (ref 0–0.5)
LYMPHOCYTES # BLD AUTO: 0.5 K/UL (ref 1–4.8)
LYMPHOCYTES NFR BLD: 4.7 % (ref 18–48)
MAGNESIUM SERPL-MCNC: 2.2 MG/DL (ref 1.6–2.6)
MCH RBC QN AUTO: 29.3 PG (ref 27–31)
MCHC RBC AUTO-ENTMCNC: 32.5 G/DL (ref 32–36)
MCV RBC AUTO: 90 FL (ref 82–98)
MODE: ABNORMAL
MONOCYTES # BLD AUTO: 1 K/UL (ref 0.3–1)
MONOCYTES NFR BLD: 10.4 % (ref 4–15)
NEUTROPHILS # BLD AUTO: 8 K/UL (ref 1.8–7.7)
NEUTROPHILS NFR BLD: 84.2 % (ref 38–73)
NRBC BLD-RTO: 0 /100 WBC
PCO2 BLDA: 61.9 MMHG (ref 35–45)
PH SMN: 7.44 [PH] (ref 7.35–7.45)
PLATELET # BLD AUTO: 217 K/UL (ref 150–450)
PMV BLD AUTO: 9.3 FL (ref 9.2–12.9)
PO2 BLDA: 32 MMHG (ref 40–60)
POC BE: 15 MMOL/L
POC SATURATED O2: 60 % (ref 95–100)
POC TCO2: 43 MMOL/L (ref 24–29)
POCT GLUCOSE: 116 MG/DL (ref 70–110)
POCT GLUCOSE: 123 MG/DL (ref 70–110)
POCT GLUCOSE: 209 MG/DL (ref 70–110)
POCT GLUCOSE: 308 MG/DL (ref 70–110)
RBC # BLD AUTO: 2.66 M/UL (ref 4.6–6.2)
SAMPLE: ABNORMAL
SITE: ABNORMAL
SP02: 94
VANCOMYCIN SERPL-MCNC: 17.2 UG/ML
WBC # BLD AUTO: 9.55 K/UL (ref 3.9–12.7)

## 2023-03-24 PROCEDURE — 80202 ASSAY OF VANCOMYCIN: CPT | Performed by: STUDENT IN AN ORGANIZED HEALTH CARE EDUCATION/TRAINING PROGRAM

## 2023-03-24 PROCEDURE — 25000242 PHARM REV CODE 250 ALT 637 W/ HCPCS: Performed by: HOSPITALIST

## 2023-03-24 PROCEDURE — 99233 SBSQ HOSP IP/OBS HIGH 50: CPT | Mod: ,,, | Performed by: INTERNAL MEDICINE

## 2023-03-24 PROCEDURE — 25000003 PHARM REV CODE 250: Performed by: REGISTERED NURSE

## 2023-03-24 PROCEDURE — 27100171 HC OXYGEN HIGH FLOW UP TO 24 HOURS

## 2023-03-24 PROCEDURE — 99900035 HC TECH TIME PER 15 MIN (STAT)

## 2023-03-24 PROCEDURE — 99291 CRITICAL CARE FIRST HOUR: CPT | Mod: ,,, | Performed by: INTERNAL MEDICINE

## 2023-03-24 PROCEDURE — 99497 ADVNCD CARE PLAN 30 MIN: CPT | Mod: ,,, | Performed by: REGISTERED NURSE

## 2023-03-24 PROCEDURE — 63600175 PHARM REV CODE 636 W HCPCS: Performed by: INTERNAL MEDICINE

## 2023-03-24 PROCEDURE — 25000003 PHARM REV CODE 250: Performed by: HOSPITALIST

## 2023-03-24 PROCEDURE — 99233 PR SUBSEQUENT HOSPITAL CARE,LEVL III: ICD-10-PCS | Mod: ,,, | Performed by: REGISTERED NURSE

## 2023-03-24 PROCEDURE — 25000003 PHARM REV CODE 250: Performed by: STUDENT IN AN ORGANIZED HEALTH CARE EDUCATION/TRAINING PROGRAM

## 2023-03-24 PROCEDURE — 82803 BLOOD GASES ANY COMBINATION: CPT

## 2023-03-24 PROCEDURE — 25000003 PHARM REV CODE 250: Performed by: INTERNAL MEDICINE

## 2023-03-24 PROCEDURE — 63600175 PHARM REV CODE 636 W HCPCS: Mod: TB,JG | Performed by: HOSPITALIST

## 2023-03-24 PROCEDURE — 94761 N-INVAS EAR/PLS OXIMETRY MLT: CPT

## 2023-03-24 PROCEDURE — 20000000 HC ICU ROOM

## 2023-03-24 PROCEDURE — 83735 ASSAY OF MAGNESIUM: CPT | Performed by: HOSPITALIST

## 2023-03-24 PROCEDURE — 99900026 HC AIRWAY MAINTENANCE (STAT)

## 2023-03-24 PROCEDURE — 99497 PR ADVNCD CARE PLAN 30 MIN: ICD-10-PCS | Mod: ,,, | Performed by: REGISTERED NURSE

## 2023-03-24 PROCEDURE — 25000003 PHARM REV CODE 250: Performed by: ANESTHESIOLOGY

## 2023-03-24 PROCEDURE — 27000221 HC OXYGEN, UP TO 24 HOURS

## 2023-03-24 PROCEDURE — 85730 THROMBOPLASTIN TIME PARTIAL: CPT | Performed by: STUDENT IN AN ORGANIZED HEALTH CARE EDUCATION/TRAINING PROGRAM

## 2023-03-24 PROCEDURE — 85025 COMPLETE CBC W/AUTO DIFF WBC: CPT | Performed by: HOSPITALIST

## 2023-03-24 PROCEDURE — 94640 AIRWAY INHALATION TREATMENT: CPT

## 2023-03-24 PROCEDURE — 85520 HEPARIN ASSAY: CPT | Performed by: STUDENT IN AN ORGANIZED HEALTH CARE EDUCATION/TRAINING PROGRAM

## 2023-03-24 PROCEDURE — 63600175 PHARM REV CODE 636 W HCPCS: Performed by: STUDENT IN AN ORGANIZED HEALTH CARE EDUCATION/TRAINING PROGRAM

## 2023-03-24 PROCEDURE — 99233 SBSQ HOSP IP/OBS HIGH 50: CPT | Mod: ,,, | Performed by: REGISTERED NURSE

## 2023-03-24 PROCEDURE — 63600175 PHARM REV CODE 636 W HCPCS: Performed by: ANESTHESIOLOGY

## 2023-03-24 PROCEDURE — 99233 PR SUBSEQUENT HOSPITAL CARE,LEVL III: ICD-10-PCS | Mod: ,,, | Performed by: INTERNAL MEDICINE

## 2023-03-24 PROCEDURE — 99291 PR CRITICAL CARE, E/M 30-74 MINUTES: ICD-10-PCS | Mod: ,,, | Performed by: INTERNAL MEDICINE

## 2023-03-24 RX ORDER — DEXMEDETOMIDINE HYDROCHLORIDE 4 UG/ML
0-1.4 INJECTION, SOLUTION INTRAVENOUS CONTINUOUS
Status: DISCONTINUED | OUTPATIENT
Start: 2023-03-24 | End: 2023-04-03

## 2023-03-24 RX ORDER — HALOPERIDOL 5 MG/ML
5 INJECTION INTRAMUSCULAR ONCE
Status: COMPLETED | OUTPATIENT
Start: 2023-03-24 | End: 2023-03-24

## 2023-03-24 RX ORDER — HEPARIN SODIUM,PORCINE/D5W 25000/250
0-40 INTRAVENOUS SOLUTION INTRAVENOUS CONTINUOUS
Status: DISCONTINUED | OUTPATIENT
Start: 2023-03-24 | End: 2023-03-27

## 2023-03-24 RX ADMIN — IPRATROPIUM BROMIDE AND ALBUTEROL SULFATE 3 ML: 2.5; .5 SOLUTION RESPIRATORY (INHALATION) at 04:03

## 2023-03-24 RX ADMIN — POLYETHYLENE GLYCOL 3350 17 G: 17 POWDER, FOR SOLUTION ORAL at 09:03

## 2023-03-24 RX ADMIN — ATORVASTATIN CALCIUM 80 MG: 40 TABLET, FILM COATED ORAL at 08:03

## 2023-03-24 RX ADMIN — GLYCOPYRROLATE 1 MG: 1 TABLET ORAL at 02:03

## 2023-03-24 RX ADMIN — VANCOMYCIN HYDROCHLORIDE 1000 MG: 1 INJECTION, POWDER, LYOPHILIZED, FOR SOLUTION INTRAVENOUS at 08:03

## 2023-03-24 RX ADMIN — MUPIROCIN: 20 OINTMENT TOPICAL at 08:03

## 2023-03-24 RX ADMIN — FUROSEMIDE 40 MG: 40 TABLET ORAL at 08:03

## 2023-03-24 RX ADMIN — FERROUS SULFATE TAB 325 MG (65 MG ELEMENTAL FE) 1 EACH: 325 (65 FE) TAB at 08:03

## 2023-03-24 RX ADMIN — ASPIRIN 81 MG: 81 TABLET, COATED ORAL at 08:03

## 2023-03-24 RX ADMIN — CLOPIDOGREL BISULFATE 75 MG: 75 TABLET ORAL at 08:03

## 2023-03-24 RX ADMIN — GLYCOPYRROLATE 1 MG: 1 TABLET ORAL at 08:03

## 2023-03-24 RX ADMIN — HEPARIN SODIUM 12 UNITS/KG/HR: 10000 INJECTION, SOLUTION INTRAVENOUS at 10:03

## 2023-03-24 RX ADMIN — POLYETHYLENE GLYCOL 3350 17 G: 17 POWDER, FOR SOLUTION ORAL at 08:03

## 2023-03-24 RX ADMIN — FUROSEMIDE 40 MG: 40 TABLET ORAL at 06:03

## 2023-03-24 RX ADMIN — CEFEPIME HYDROCHLORIDE 2 G: 2 INJECTION, SOLUTION INTRAVENOUS at 09:03

## 2023-03-24 RX ADMIN — CEFEPIME HYDROCHLORIDE 2 G: 2 INJECTION, SOLUTION INTRAVENOUS at 05:03

## 2023-03-24 RX ADMIN — GLYCOPYRROLATE 1 MG: 1 TABLET ORAL at 09:03

## 2023-03-24 RX ADMIN — MELATONIN TAB 3 MG 9 MG: 3 TAB at 09:03

## 2023-03-24 RX ADMIN — PREDNISONE 50 MG: 50 TABLET ORAL at 08:03

## 2023-03-24 RX ADMIN — DEXMEDETOMIDINE HYDROCHLORIDE 0.2 MCG/KG/HR: 4 INJECTION, SOLUTION INTRAVENOUS at 05:03

## 2023-03-24 RX ADMIN — CEFEPIME HYDROCHLORIDE 2 G: 2 INJECTION, SOLUTION INTRAVENOUS at 02:03

## 2023-03-24 RX ADMIN — HALOPERIDOL LACTATE 5 MG: 5 INJECTION, SOLUTION INTRAMUSCULAR at 03:03

## 2023-03-24 RX ADMIN — MUPIROCIN: 20 OINTMENT TOPICAL at 09:03

## 2023-03-24 RX ADMIN — HYDROXYZINE HYDROCHLORIDE 25 MG: 25 TABLET ORAL at 09:03

## 2023-03-24 NOTE — PROGRESS NOTES
Pharmacokinetic Assessment Follow Up: IV Vancomycin    Vancomycin serum concentration assessment(s):    The random level was drawn correctly and can be used to guide therapy at this time. The measurement is within the desired definitive target range of 10 to 20 mcg/mL.    Vancomycin Regimen Plan:    Change regimen to Vancomycin 1000 mg IV every 24 hours with next serum trough concentration measured at 0830 prior to 3rd dose on 3/26/2023    Drug levels (last 3 results):  Recent Labs   Lab Result Units 03/23/23  0332 03/23/23  1627 03/24/23  0357   Vancomycin, Random ug/mL  --  22.5 17.2   Vancomycin-Trough ug/mL 22.8*  --   --        Pharmacy will continue to follow and monitor vancomycin.    Please contact pharmacy at extension [phone] for questions regarding this assessment.    Thank you for the consult,   Arnoldo Mccarthy Jr       Patient brief summary:  Fareed Ricahrd Jr. is a 73 y.o. male initiated on antimicrobial therapy with IV Vancomycin for treatment of sepsis    Drug Allergies:   Review of patient's allergies indicates:  No Known Allergies    Actual Body Weight:   68 kg    Renal Function:   Estimated Creatinine Clearance: 89 mL/min (based on SCr of 0.7 mg/dL).,     Dialysis Method (if applicable):  N/A    CBC (last 72 hours):  Recent Labs   Lab Result Units 03/22/23  0233 03/22/23  0450 03/23/23  0332 03/24/23  0357   WBC K/uL 5.13  5.13 6.14 10.27  10.27 9.55   Hemoglobin g/dL 6.9*  6.9* 7.5* 7.1*  7.1* 7.8*   Hematocrit % 20.9*  20.9* 23.1* 21.2*  21.2* 24.0*   Platelets K/uL 163  163 199 185  185 217   Gran % % 91.6*  91.6* 92.9* 91.2*  91.2* 84.2*   Lymph % % 5.5*  5.5* 4.1* 2.7*  2.7* 4.7*   Mono % % 2.3*  2.3* 2.3* 5.4  5.4 10.4   Eosinophil % % 0.0  0.0 0.0 0.0  0.0 0.0   Basophil % % 0.2  0.2 0.2 0.0  0.0 0.1   Differential Method  Automated  Automated Automated Automated  Automated Automated       Metabolic Panel (last 72 hours):  Recent Labs   Lab Result Units 03/22/23  4183  03/23/23  0332 03/24/23  0357   Sodium mmol/L 137 135*  --    Potassium mmol/L 4.2 3.8  --    Chloride mmol/L 97 94*  --    CO2 mmol/L 33* 32*  --    Glucose mg/dL 181* 178*  --    BUN mg/dL 24* 33*  --    Creatinine mg/dL 0.8 0.7  --    Albumin g/dL  --  2.2*  --    Magnesium mg/dL 1.9 2.1 2.2   Phosphorus mg/dL  --  2.9  --        Vancomycin Administrations:  vancomycin given in the last 96 hours                     vancomycin (VANCOCIN) 1,000 mg in dextrose 5 % (D5W) 250 mL IVPB (Vial-Mate) (mg) 1,000 mg New Bag 03/23/23 0333     1,000 mg New Bag 03/22/23 1511     1,000 mg New Bag  0454     1,000 mg New Bag 03/21/23 1719     1,000 mg New Bag  0357     1,000 mg New Bag 03/20/23 1432                    Microbiologic Results:  Microbiology Results (last 7 days)       Procedure Component Value Units Date/Time    Blood culture [[id number]] Collected: 03/19/23 1307    Order Status: Completed Specimen: Blood from Hand Updated: 03/23/23 1503     Blood Culture, Routine No Growth after 4 days.    Blood culture [[id number]] Collected: 03/19/23 1307    Order Status: Completed Specimen: Blood from Antecubital, Right Hand Updated: 03/23/23 1503     Blood Culture, Routine No Growth after 4 days.    Culture, Respiratory with Gram Stain [[id number]] Collected: 03/22/23 1528    Order Status: Completed Specimen: Respiratory from Endotracheal Aspirate Updated: 03/23/23 1140     Respiratory Culture Insufficient incubation, culture in progress     Gram Stain (Respiratory) <10 epithelial cells per low power field.     Gram Stain (Respiratory) Few WBC's     Gram Stain (Respiratory) Rare Gram negative rods     Gram Stain (Respiratory) Rare yeast    Respiratory Viral Panel by PCR (Sources other than NP Swab) [[id number]] Collected: 03/20/23 1735    Order Status: Canceled Specimen: Respiratory     AFB Culture & Smear [[id number]]     Order Status: No result Specimen: Sputum     Urine culture [[id number]]  (Abnormal)  (Susceptibility) Collected:  03/18/23 1400    Order Status: Completed Specimen: Urine Updated: 03/20/23 0958     Urine Culture, Routine ENTEROCOCCUS FAECALIS  >100,000 cfu/ml      Narrative:      Specimen Source->Urine

## 2023-03-24 NOTE — ASSESSMENT & PLAN NOTE
-Admitted to inpatient status  -On 3/19 developed hypoxia, hypotension, bleeding from tracheostomy / hemoptysis and severe back pain so moved to ICU  -Prior clinic notes have shown soft blood pressure - but not this low (SBP in 70s).  -Etiology unclear - concerning for hemorrhage given bleeding at trach site and anemia, but could also be sepsis due to UTI (given urine culture growing Enterococcus) or cardiogenic (given troponin bump and severe back pain)  -Lactic acid was normal.  D-dimer minimally elevated.  Troponin bumped to 2.22 and then to 3.416.  AM cortisol only 8.6.  -CTA chest showed no PE, mucoid impaction, small airway disease, and consolidation at the lung bases bilaterally, increased from prior, increased moderate left pleural effusion, emphysema with evidence of pulmonary hypertension.  -Cardiology and pulmonology consulted and input appreciated  -On 3/19 he was transfused 2 units PRBC, bolused IV fluids, started on broad spectrum antibiotics and treated with TXA nebulizers.  Pulmonology felt ct scan likely represented pneumonia.  ENT on call for system felt OK to keep at [facility] [place], [place].  He did not require pressors.  His blood pressure has stabilized, bleeding appears to have stopped and Hb has improved.  -Urine culture is growing E.faecalis S >100,000cfu/ml - await sensitivities.  Blood cultures negative thus far.  Continue vancomycin and cefepime for now.  -AM cortisol is low.  Will check cosyntropin stim test.  -Bump in troponin without chest pain felt secondary to demand ischemia.  Echo noted preserved EF and inferio-basilar hypokinesis.  Not a good candidate for LHC or blood thinning medications.  Cardiology recommended outpatient stress test.  -Continue in ICU for 24 hours more  -ENT consult - Dr. French attempted scope at bedside, no bleeding or other abnormalities noted.   - Currenlty on nitro drip for chest pain which may be contributing to lower readings. Weaning off. Hypotension resolved.

## 2023-03-24 NOTE — ASSESSMENT & PLAN NOTE
-Urine culture growing Enterococcus > 100,000 cfu/ml.  Sensitive to Vanc/Ampicillin  -Continue broad spectrum antibiotics for now and tailor based off results.  - completed 6 days of IV antibiotics. Stop antibiotics after 3/25.

## 2023-03-24 NOTE — ASSESSMENT & PLAN NOTE
-Present on admit with bleeding / hemoptysis  -Treating with txa nebs and bleeding appears to have resolved  -ENT - appreciate recommendations  -Continue home glycopyrrolate and guaifensin  -Continue supplemental oxygen

## 2023-03-24 NOTE — ASSESSMENT & PLAN NOTE
-Hb on admit 7.6 and this morning 8.0  -Baseline Hb 8-9.  -Presented w intermittent hemoptysis via trach x 2 days. Had another episode on morning of 3/19/2023  -Seen at ENT 2 days prior to admit (Dr. Smalls) with exam without source. Trache exchanged at that time.   -Ferritin only 84 and Tsat 18% - consistent with iron deficiency  -Platelets and PT/INR are normal.  -Treated with TXA nebulizer and bleeding has stopped  -Started FeSO4 which he will need at discharge  -Repeat cbc in AM. Hb 7.7 but stable, no further evidence of bleeding. Again 7.1 but no further bleeding. Hb 7.8.

## 2023-03-24 NOTE — ASSESSMENT & PLAN NOTE
Likely secondary to lower respiratory tract infection in the setting of ASA/Plavix.  It seems less likely to be physical complication of the tracheostomy tube itself.    · Antibiotics for pneumonia.  · Check sputum for culture, including AFB for possible MAC infection.  · Respiratory viral panel.  · OK to resume anti-platelet medications; monitor for bleeding   · No need for additional nebulized TXA.  No bleeding since 3/19  · Appreciate ENT evaluation

## 2023-03-24 NOTE — SUBJECTIVE & OBJECTIVE
Review of Systems   Reason unable to perform ROS: trach.   Objective:     Vital Signs (Most Recent):  Temp: 98 °F (36.7 °C) (03/24/23 0000)  Pulse: 81 (03/24/23 0817)  Resp: (!) 23 (03/24/23 0817)  BP: 114/72 (03/24/23 0630)  SpO2: (!) 94 % (03/24/23 0817)   Vital Signs (24h Range):  Temp:  [97.2 °F (36.2 °C)-98 °F (36.7 °C)] 98 °F (36.7 °C)  Pulse:  [] 81  Resp:  [12-47] 23  SpO2:  [79 %-100 %] 94 %  BP: ()/(51-98) 114/72     Weight: 68 kg (150 lb)  Body mass index is 22.81 kg/m².     SpO2: (!) 94 %         Intake/Output Summary (Last 24 hours) at 3/24/2023 0921  Last data filed at 3/24/2023 0500  Gross per 24 hour   Intake 1457.77 ml   Output 2275 ml   Net -817.23 ml       Lines/Drains/Airways       Peripherally Inserted Central Catheter Line  Duration             PICC Triple Lumen 03/20/23 2145 right basilic 3 days              Drain  Duration                  Gastrostomy/Enterostomy 01/04/22 Percutaneous endoscopic gastrostomy (PEG)  days              Airway  Duration             Adult Surgical Airway 03/16/23 1014 Shiley Uncuffed 6.0 7 days              Peripheral Intravenous Line  Duration                  Peripheral IV - Single Lumen 03/20/23 1825 22 G Anterior;Right Forearm 3 days                    Physical Exam  Constitutional:       General: He is not in acute distress.     Appearance: He is well-developed. He is ill-appearing. He is not toxic-appearing or diaphoretic.   HENT:      Head: Normocephalic and atraumatic.   Eyes:      General: No scleral icterus.     Extraocular Movements: Extraocular movements intact.      Conjunctiva/sclera: Conjunctivae normal.      Pupils: Pupils are equal, round, and reactive to light.   Neck:      Thyroid: No thyromegaly.      Vascular: No JVD.      Trachea: No tracheal deviation.      Comments: Trach in place  Cardiovascular:      Rate and Rhythm: Normal rate and regular rhythm.      Heart sounds: S1 normal and S2 normal. Heart sounds are distant.  No murmur heard.    No friction rub. No gallop.   Pulmonary:      Effort: Pulmonary effort is normal. No respiratory distress.      Breath sounds: Normal breath sounds. No stridor. No wheezing, rhonchi or rales.   Chest:      Chest wall: No tenderness.   Abdominal:      General: There is no distension.      Palpations: Abdomen is soft.   Musculoskeletal:         General: No swelling or tenderness. Normal range of motion.      Cervical back: Normal range of motion and neck supple. No rigidity.      Right lower leg: No edema.      Left lower leg: No edema.   Skin:     General: Skin is warm and dry.      Coloration: Skin is not jaundiced.   Neurological:      General: No focal deficit present.      Mental Status: He is oriented to person, place, and time.      Cranial Nerves: No cranial nerve deficit.   Psychiatric:         Mood and Affect: Mood normal.         Behavior: Behavior normal.       Current Medications:   aspirin  81 mg Oral Daily    atorvastatin  80 mg Per G Tube Daily    ceFEPime (MAXIPIME) IVPB  2 g Intravenous Q8H    clopidogreL  75 mg Oral Daily    ferrous sulfate  1 tablet Per G Tube Daily    furosemide  40 mg Per G Tube BID    glycopyrrolate  1 mg Per G Tube TID    LIDOcaine  2 patch Transdermal Q24H    melatonin  9 mg Per G Tube Nightly    mupirocin   Nasal BID    polyethylene glycol  17 g Oral BID    predniSONE  50 mg Per G Tube Daily    sodium chloride 0.9%  10 mL Intravenous Q6H    vancomycin (VANCOCIN) IVPB  1,000 mg Intravenous Q24H      dexmedeTOMIDine (Precedex) infusion (titrating) 0.2 mcg/kg/hr (03/24/23 0557)    nitroGLYCERIN Stopped (03/24/23 0117)    NORepinephrine bitartrate-D5W Stopped (03/22/23 0677)     sodium chloride, acetaminophen, acetaminophen, albuterol-ipratropium, fentaNYL, guaiFENesin 100 mg/5 ml, HYDROcodone-acetaminophen, hydrOXYzine HCL, naloxone, nitroGLYCERIN, ondansetron, oxyCODONE, Flushing PICC Protocol **AND** sodium chloride 0.9% **AND** sodium chloride 0.9%,  Pharmacy to dose Vancomycin consult **AND** vancomycin - pharmacy to dose    Laboratory (all labs reviewed):  CBC:  Recent Labs   Lab 03/21/23  0242 03/22/23  0233 03/22/23  0450 03/23/23  0332 03/24/23  0357   WBC 10.11  10.11 5.13  5.13 6.14 10.27  10.27 9.55   Hemoglobin 7.7 L  7.7 L 6.9 L  6.9 L 7.5 L 7.1 L  7.1 L 7.8 L   Hematocrit 25.5 L  25.5 L 20.9 L  20.9 L 23.1 L 21.2 L  21.2 L 24.0 L   Platelets 191  191 163  163 199 185  185 217       CHEMISTRIES:  Recent Labs   Lab 05/10/22  1031 05/11/22  0412 05/12/22  0343 05/14/22  0724 05/17/22  1007 08/24/22  1000 03/19/23  0413 03/20/23  0205 03/21/23  0242 03/22/23  0233 03/23/23  0332 03/24/23  0357   Glucose 130 H 103 110 96 162 H   < > 132 H 146 H 177 H 181 H 178 H  --    Sodium 140 139 135 L 136 133 L   < > 144 143 139 137 135 L  --    Potassium 4.6 5.0 4.4 4.1 5.0   < > 4.8 4.2 3.7 4.2 3.8  --    BUN 22 26 H 24 H 18 15   < > 44 H 25 H 19 24 H 33 H  --    Creatinine 0.8 0.7 0.7 0.7 0.7   < > 0.7 0.6 0.7 0.8 0.7  --    eGFR if non African American >60.0 >60.0 >60.0 >60 >60.0  --   --   --   --   --   --   --    eGFR  --   --   --   --   --    < > >60 >60 >60 >60 >60  --    Calcium 9.7 9.7 9.1 9.6 9.5   < > 8.2 L 8.4 L 8.3 L 8.1 L 8.0 L  --    Magnesium  --  2.3 2.0  --  2.0   < >  --  2.0 1.8 1.9 2.1 2.2    < > = values in this interval not displayed.       CARDIAC BIOMARKERS:  Recent Labs   Lab 09/14/22  0459 03/18/23  1106 03/19/23  1308 03/19/23  1840 03/20/23  1642   Troponin I 0.010 0.010 1.736 H 2.222 H 3.416 H       COAGS:  Recent Labs   Lab 01/04/22  0405 01/12/22  0510 09/14/22  0641 03/18/23  1106 03/20/23  1744   INR 0.9 0.9 1.0 1.0 1.0       LIPIDS/LFTS:  Recent Labs   Lab 09/23/22  1247 09/30/22  0317 11/23/22  0733 03/18/23  1106 03/19/23  0413   AST 13 16 17 16 22   ALT 13 16 14 16 15       BNP:  Recent Labs   Lab 05/14/22  0725 09/14/22  0459 09/23/22  1247 03/18/23  1106 03/21/23  1525   BNP 98 185 H 468 H 136 H 2,543 H        TSH:  Recent Labs   Lab 02/16/22  1157 08/24/22  1000 11/23/22  0733 01/24/23  1105 03/19/23  1308   TSH 1.332 2.279 4.924 H 4.861 H 2.787       Free T4:  Recent Labs   Lab 11/23/22  0733 01/24/23  1105   Free T4 0.86 0.90       Diagnostic Results:  Echo: 3/20/23  The left ventricle is normal in size with concentric hypertrophy and normal systolic function.  The estimated ejection fraction is 60%. Inferobasal hypokinesis  Indeterminate left ventricular diastolic function.  Normal right ventricular size with normal right ventricular systolic function.  Mild left atrial enlargement.  Mild right atrial enlargement.  Normal central venous pressure (3 mmHg).  The estimated PA systolic pressure is 13 mmHg.  The sinuses of Valsalva is mildly dilated. 4.2cm.    Cath: 3/23/23 (images personally reviewed and interpreted)  3/23/23 LHC - EDP 13, distal LM 30%, mild LAD 90% bifurcation lesion with D1, Cx mid 80%, RCA moderate diffuse disease with long 70% and some left to right collateral. All vessels heavily calcified  Reviewed with Dr Malone - poor candidate for CABG. Will discuss staged PCI of LAD/Cx if able to tolerate DAPT without further pulmonary bleeding

## 2023-03-24 NOTE — ASSESSMENT & PLAN NOTE
Patient with Hypoxic Respiratory failure which is Acute on chronic.  he is on home oxygen at 2 LPM. Supplemental oxygen was provided and noted- Vent Mode: PS/CPAP  Oxygen Concentration (%):  [40] 40  Resp Rate Total:  [15 br/min-18 br/min] 18 br/min  PEEP/CPAP:  [8 cmH20] 8 cmH20  Pressure Support:  [5 cmH20] 5 cmH20  Mean Airway Pressure:  [9.8 cmH20] 9.8 cmH20.   Signs/symptoms of respiratory failure include- tachypnea and increased work of breathing. Contributing diagnoses includes - Aspiration and Pneumonia Labs and images were reviewed. Patient Has not had a recent ABG. Will treat underlying causes and adjust management of respiratory failure as follows   - changed out trach and now placed to ventilator for positive pressure  - had secretions in trach with exchange. Appears to be more comfortable now.

## 2023-03-24 NOTE — ASSESSMENT & PLAN NOTE
-Admitted to inpatient status  -On 3/19 developed hypoxia, hypotension, bleeding from tracheostomy / hemoptysis and severe back pain so moved to ICU  -Prior clinic notes have shown soft blood pressure - but not this low (SBP in 70s).  -Etiology unclear - concerning for hemorrhage given bleeding at trach site and anemia, but could also be sepsis due to UTI (given urine culture growing Enterococcus) or cardiogenic (given troponin bump and severe back pain)  -Lactic acid was normal.  D-dimer minimally elevated.  Troponin bumped to 2.22 and then to 3.416.  AM cortisol only 8.6.  -CTA chest showed no PE, mucoid impaction, small airway disease, and consolidation at the lung bases bilaterally, increased from prior, increased moderate left pleural effusion, emphysema with evidence of pulmonary hypertension.  -Cardiology and pulmonology consulted and input appreciated  -On 3/19 he was transfused 2 units PRBC, bolused IV fluids, started on broad spectrum antibiotics and treated with TXA nebulizers.  Pulmonology felt ct scan likely represented pneumonia.  ENT on call for system felt OK to keep at [facility].  He did not require pressors.  His blood pressure has stabilized, bleeding appears to have stopped and Hb has improved.  -Urine culture is growing E.faecalis S >100,000cfu/ml - await sensitivities.  Blood cultures negative thus far.  Continue vancomycin and cefepime for now.  -AM cortisol is low.  Will check cosyntropin stim test.  -Bump in troponin without chest pain felt secondary to demand ischemia.  Echo noted preserved EF and inferio-basilar hypokinesis.  Not a good candidate for LHC or blood thinning medications.  Cardiology recommended outpatient stress test.  -Continue in ICU for 24 hours more  -ENT consult - Dr. French attempted scope at bedside, no bleeding or other abnormalities noted.   - Currenlty on nitro drip for chest pain which may be contributing to lower readings. Weaning off. Hypotension resolved.

## 2023-03-24 NOTE — PROGRESS NOTES
Assumed care of patient at this time. Pt is drowsy, will awaken and follows some commands. Speech difficult to understand due to trach. Quickly falls back to sleep. Pupils equal and reactive bilaterally, afebrile. Extremities equal and spontaneous in movement. Lungs coarse, VSS. No acute distress noted at this time. Heparin infusing at 12 units/kg/hr through right arm picc. Wife at bedside, all questions/concerns addressed.

## 2023-03-24 NOTE — ASSESSMENT & PLAN NOTE
- cardiology consulted and following   - Echo showed EF 60%, inferobasal hypokinesis, indeterminate diastolic function  - nitro drip weaning; pt denying any continued angina/pain   - cardiac cath procedure on 3/23; per cardiology poor CABG candidate; potential high risk PCI in the future, cardiology plans to discuss with wife   - pt previously agreeable to any recommended cardiac procedures at this time   - noted; management per hospital primary, PCCM, and Cardiology

## 2023-03-24 NOTE — SUBJECTIVE & OBJECTIVE
Interval History:  weaning down nitro today, tolerating well. On trach collar as well. Brought for [facility] today.    Objective:     Vital Signs (Most Recent):  Temp: 97.2 °F (36.2 °C) (03/23/23 1630)  Pulse: 92 (03/23/23 1730)  Resp: (!) 22 (03/23/23 1730)  BP: 107/68 (03/23/23 1730)  SpO2: 99 % (03/23/23 1730)   Vital Signs (24h Range):  Temp:  [97.2 °F (36.2 °C)-97.8 °F (36.6 °C)] 97.2 °F (36.2 °C)  Pulse:  [] 92  Resp:  [13-29] 22  SpO2:  [95 %-100 %] 99 %  BP: ()/(51-98) 107/68     Weight: 68 kg (150 lb)  Body mass index is 22.81 kg/m².    Intake/Output Summary (Last 24 hours) at 3/23/2023 1902  Last data filed at 3/23/2023 1642  Gross per 24 hour   Intake 1301.85 ml   Output 1800 ml   Net -498.15 ml        Physical Exam  Vitals and nursing note reviewed.   Constitutional:       General: He is not in acute distress.     Appearance: He is well-developed. He is ill-appearing. He is not diaphoretic.   HENT:      Head: Normocephalic and atraumatic.   Eyes:      General:         Right eye: No discharge.         Left eye: No discharge.      Conjunctiva/sclera: Conjunctivae normal.   Neck:      Thyroid: No thyromegaly.      Comments: Trach in place.  No evidence of bleeding now.  Cardiovascular:      Rate and Rhythm: Normal rate and regular rhythm.      Heart sounds: No murmur heard.  Pulmonary:      Effort: Pulmonary effort is normal. No respiratory distress.      Breath sounds: Normal breath sounds.      Comments: Now on trach collar  Abdominal:      General: Bowel sounds are normal. There is no distension.      Palpations: Abdomen is soft. There is no mass.      Tenderness: There is no abdominal tenderness.      Comments: Peg tube in place   Musculoskeletal:         General: No deformity.      Cervical back: Normal range of motion and neck supple.   Skin:     General: Skin is warm and dry.   Neurological:      Mental Status: He is alert and oriented to person, place, and time.      Comments: Symmetric  movement of BUE and BLE against gravity. Attempts to speak and communicates by writing in his notebook.  Diffusely weak   Psychiatric:      Comments: Anxious       Significant Labs: All pertinent labs within the past 24 hours have been reviewed.    Significant Imaging: I have reviewed all pertinent imaging results/findings within the past 24 hours.    Review of Systems

## 2023-03-24 NOTE — PROGRESS NOTES
[facility] Medicine  Progress Note    Patient Name: Fareed Richard Jr.  MRN: [id number]  Patient Class: IP- Inpatient   Admission Date: 3/18/2023  Length of Stay: 4 days  Attending Physician: Luiz Patel MD  Primary Care Provider: Kodi Tubbs MD        Subjective:     Principal Problem:Hypotension        HPI:  Fareed Richard Jr. 73 y.o. 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  Per his wife 4 days ago he began to have pink tinged sputum via his trach.  He was seen by his ENT 2 days ago with a scope exam and a trach exchange without evidence of bleeding.  This morning at 3:00 a.m. he developed bright red blood via trach which resolved without intervention.  It is since not recurred.  He takes a daily aspirin.  He receives all medications via G-tube.  His tube feeding regimen consists of 6 cans of Isosource daily with 120 cc free water boluses.  He denies fever chest pain shortness of breath nausea vomiting abdominal pain leg swelling syncope dizziness dysuria melena hematuria hematemesis.    In the ED, hemoglobin 7.6 INR 1.0 BUN 50 creatinine 0.7  troponin negative BRCA negative O positive type and screen chest x-ray without detrimental change hypotensive to 85/54.      Overview/Hospital Course:  No notes on file    Interval History:  weaning down nitro today, tolerating well. On trach collar as well. Brought for [facility] today.    Objective:     Vital Signs (Most Recent):  Temp: 97.2 °F (36.2 °C) (03/23/23 1630)  Pulse: 92 (03/23/23 1730)  Resp: (!) 22 (03/23/23 1730)  BP: 107/68 (03/23/23 1730)  SpO2: 99 % (03/23/23 1730)   Vital Signs (24h Range):  Temp:  [97.2 °F (36.2 °C)-97.8 °F (36.6 °C)] 97.2 °F (36.2 °C)  Pulse:  [] 92  Resp:  [13-29] 22  SpO2:  [95 %-100 %] 99 %  BP: ()/(51-98) 107/68     Weight: 68 kg (150 lb)  Body mass index is 22.81  kg/m².    Intake/Output Summary (Last 24 hours) at 3/23/2023 1902  Last data filed at 3/23/2023 1642  Gross per 24 hour   Intake 1301.85 ml   Output 1800 ml   Net -498.15 ml        Physical Exam  Vitals and nursing note reviewed.   Constitutional:       General: He is not in acute distress.     Appearance: He is well-developed. He is ill-appearing. He is not diaphoretic.   HENT:      Head: Normocephalic and atraumatic.   Eyes:      General:         Right eye: No discharge.         Left eye: No discharge.      Conjunctiva/sclera: Conjunctivae normal.   Neck:      Thyroid: No thyromegaly.      Comments: Trach in place.  No evidence of bleeding now.  Cardiovascular:      Rate and Rhythm: Normal rate and regular rhythm.      Heart sounds: No murmur heard.  Pulmonary:      Effort: Pulmonary effort is normal. No respiratory distress.      Breath sounds: Normal breath sounds.      Comments: Now on trach collar  Abdominal:      General: Bowel sounds are normal. There is no distension.      Palpations: Abdomen is soft. There is no mass.      Tenderness: There is no abdominal tenderness.      Comments: Peg tube in place   Musculoskeletal:         General: No deformity.      Cervical back: Normal range of motion and neck supple.   Skin:     General: Skin is warm and dry.   Neurological:      Mental Status: He is alert and oriented to person, place, and time.      Comments: Symmetric movement of BUE and BLE against gravity. Attempts to speak and communicates by writing in his notebook.  Diffusely weak   Psychiatric:      Comments: Anxious       Significant Labs: All pertinent labs within the past 24 hours have been reviewed.    Significant Imaging: I have reviewed all pertinent imaging results/findings within the past 24 hours.    Review of Systems      Assessment/Plan:      * Hypotension  -Admitted to inpatient status  -On 3/19 developed hypoxia, hypotension, bleeding from tracheostomy / hemoptysis and severe back pain so  moved to ICU  -Prior clinic notes have shown soft blood pressure - but not this low (SBP in 70s).  -Etiology unclear - concerning for hemorrhage given bleeding at trach site and anemia, but could also be sepsis due to UTI (given urine culture growing Enterococcus) or cardiogenic (given troponin bump and severe back pain)  -Lactic acid was normal.  D-dimer minimally elevated.  Troponin bumped to 2.22 and then to 3.416.  AM cortisol only 8.6.  -CTA chest showed no PE, mucoid impaction, small airway disease, and consolidation at the lung bases bilaterally, increased from prior, increased moderate left pleural effusion, emphysema with evidence of pulmonary hypertension.  -Cardiology and pulmonology consulted and input appreciated  -On 3/19 he was transfused 2 units PRBC, bolused IV fluids, started on broad spectrum antibiotics and treated with TXA nebulizers.  Pulmonology felt ct scan likely represented pneumonia.  ENT on call for system felt OK to keep at [facility].  He did not require pressors.  His blood pressure has stabilized, bleeding appears to have stopped and Hb has improved.  -Urine culture is growing E.faecalis S >100,000cfu/ml - await sensitivities.  Blood cultures negative thus far.  Continue vancomycin and cefepime for now.  -AM cortisol is low.  Will check cosyntropin stim test.  -Bump in troponin without chest pain felt secondary to demand ischemia.  Echo noted preserved EF and inferio-basilar hypokinesis.  Not a good candidate for LHC or blood thinning medications.  Cardiology recommended outpatient stress test.  -Continue in ICU for 24 hours more  -ENT consult - Dr. French attempted scope at bedside, no bleeding or other abnormalities noted.   - Currenlty on nitro drip for chest pain which may be contributing to lower readings. Weaning off. Hypotension resolved.    UTI (urinary tract infection)  -Urine culture growing Enterococcus > 100,000 cfu/ml.  Sensitive to Vanc/Ampicillin  -Continue broad  spectrum antibiotics for now and tailor based off results.    Hemoptysis  -Treatment as above.    PEG (percutaneous endoscopic gastrostomy) status  -Continue medications via PEG. Does not take oral intake  -On isosource 1.5 6 days daily with 120cc free water flushes via wife  -Will continue home tube feedings, with nutrition consulted    Tracheostomy present  -Present on admit with bleeding / hemoptysis  -Treating with txa nebs and bleeding appears to have resolved  -ENT - appreciate recommendations  -Continue home glycopyrrolate and guaifensin  -Continue supplemental oxygen    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   -TSH is normal and he is not on treatment as outpatient    Acute respiratory failure with hypoxia  Patient with Hypoxic Respiratory failure which is Acute on chronic.  he is on home oxygen at 2 LPM. Supplemental oxygen was provided and noted- Vent Mode: PS/CPAP  Oxygen Concentration (%):  [40] 40  Resp Rate Total:  [15 br/min-18 br/min] 18 br/min  PEEP/CPAP:  [8 cmH20] 8 cmH20  Pressure Support:  [5 cmH20] 5 cmH20  Mean Airway Pressure:  [9.8 cmH20] 9.8 cmH20.   Signs/symptoms of respiratory failure include- tachypnea and increased work of breathing. Contributing diagnoses includes - Aspiration and Pneumonia Labs and images were reviewed. Patient Has not had a recent ABG. Will treat underlying causes and adjust management of respiratory failure as follows   - changed out trach and now placed to ventilator for positive pressure  - had secretions in trach with exchange. Appears to be more comfortable now.    Acute on chronic respiratory failure with hypoxia  -Patient with acute on chronic hypoxic respiratory failure  -At home is on 5L O2 via trach and O2 sats typically in high 80s low 90s  -Sats presently similar, but is having episodes of hemoptysis  -Acute etiology is likely due to pneumonia, mucus plugging and hemoptysis.  -Consulted pulm-critical care and input  appreciated  -Discussed with ENT on call at [facility] 3/19 and OK to keep at . Dr. French evaluated.  -Continue antibiotics as above.  -Continue supplemental O2 via Trach and wean as able. Placed on mechanical ventilation on 3/21 for respiratory distress and increasing oxygen requirements.  Slowly weaning at this time.  Appears more comfortable today. Weaned off vent on 3/23.     Acute blood loss anemia  -Hb on admit 7.6 and this morning 8.0  -Baseline Hb 8-9.  -Presented w intermittent hemoptysis via trach x 2 days. Had another episode on morning of 3/19/2023  -Seen at ENT 2 days prior to admit (Dr. Smalls) with exam without source. Trache exchanged at that time.   -Ferritin only 84 and Tsat 18% - consistent with iron deficiency  -Platelets and PT/INR are normal.  -Treated with TXA nebulizer and bleeding has stopped  -Started FeSO4 which he will need at discharge  -Repeat cbc in AM. Hb 7.7 but stable, no further evidence of bleeding. Again 7.1 but no further bleeding.    Other hyperlipidemia  -Continue home statin    Primary hypertension  -BP typically in the 90s to low 100s systolic per chart review.   -Noted hypotension 3/19 which is addressed above.  -Holding home metoprolol and amlodipine    Coronary artery disease involving native coronary artery of native heart without angina pectoris  -Denies chest pain but on 3/19 did have unexplained hypotension with severe back/shoulder pain  -Troponin on admit normal, but on 3/19 bumped to 1.7 and then 2.2 on 3/20.  -No jamil ischemic change on EKG  -Echo showed EF 60%, inferobasal hypokinesis, indeterminate diastolic function  -Cardiology consulted and input appreciated  -Cardiology suspects this is due to demand ischemia associated with his anemia and hypotension.  Further, he is a poor candidate for angiogram given his hemoptysis and inability to treat with blood thinning medications.  Recommended outpatient stress testing  -Holding aspirin given hemoptysis and  "metoprolol given hypotension  -Continue statin.  Resume BB as able. On nitro drip for chest pain. - having difficulty weaning off. He is currently cleared for DAPT as no further bleeding issues at this time. Updated Cardiology.   -Patient underwent LHC/angiogram on 2/23 - findings below.  "3/23/23 [facility] - EDP 13, distal LM 30%, mild LAD 90% bifurcation lesion with D1, Cx mid 80%, RCA moderate diffuse disease with long 70% and some left to right collateral. All vessels heavily calcified     Reviewed with Dr Malone - poor candidate for CABG. Will discuss staged PCI of LAD/Cx if able to tolerate DAPT without further pulmonary bleeding"    - continue with asa/plavix and wean nitro as tolerated      Squamous cell cancer of tongue  -Diagnosed 12/15/21 via FNA.  Underwent total glossectomy, bilateral neck dissection, bilateral cervical facial flaps and anterolateral thigh flap reconstruction of glossectomy defect 1/4/22  -Completed adjuvant chemoradiation  -Followed by oncology and ENT outpt. No longer on chemo or radiation.   -Had trach changed by ENT 2 days prior to admit and scope at that time showed no signs of bleeding.   -Treatment as above.      VTE Risk Mitigation (From admission, onward)         Ordered     IP VTE HIGH RISK PATIENT  Once         03/18/23 1620     Place sequential compression device  Until discontinued         03/18/23 1620     Place NIKITA hose  Until discontinued         03/18/23 1620                Discharge Planning   NISA:      Code Status: Full Code   Is the patient medically ready for discharge?:     Reason for patient still in hospital (select all that apply): Treatment  Discharge Plan A: Home with family            Critical care time spent on the evaluation and treatment of severe organ dysfunction, review of pertinent labs and imaging studies, discussions with consulting providers and discussions with patient/family: 25 minutes.      Luiz Patel MD  Department of Hospital Medicine   [facility] - [place], [place] " - Intensive Care

## 2023-03-24 NOTE — ASSESSMENT & PLAN NOTE
Tracheostomy in place since surgical resection for head/neck cancer in January 2022.  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    · Undergoing swallowing evaluation/training as per Speech Therapy  · Cuff trache replaced on 3/22.  Can switch back once patient is done with all intervention.

## 2023-03-24 NOTE — ASSESSMENT & PLAN NOTE
-Patient with acute on chronic hypoxic respiratory failure  -At home is on 5L O2 via trach and O2 sats typically in high 80s low 90s  -Sats presently similar, but is having episodes of hemoptysis  -Acute etiology is likely due to pneumonia, mucus plugging and hemoptysis.  -Consulted pulm-critical care and input appreciated  -Discussed with ENT on call at [facility] 3/19 and OK to keep at [facility]. Dr. French evaluated.  -Continue antibiotics as above.  -Continue supplemental O2 via Trach and wean as able. Placed on mechanical ventilation on 3/21 for respiratory distress and increasing oxygen requirements.  Slowly weaning at this time.  Appears more comfortable today. Weaned off vent on 3/23. No need for ventilator at night.

## 2023-03-24 NOTE — PROGRESS NOTES
[facility] - [place], [place] Intensive Care  Cardiology  Progress Note    Patient Name: Fareed Richard Jr.  MRN: [id number]  Admission Date: 3/18/2023  Hospital Length of Stay: 5 days  Code Status: Full Code   Attending Physician: Luiz Patel MD   Primary Care Physician: Kodi Tubbs MD  Expected Discharge Date:   Principal Problem:Hypotension    Subjective:     Hospital Course:   3/21/23 Developed increased SOB last PM. Troponin increased to 3. EKG with LBBB associated with sinus tachycardia that resolved when HR improved. Started on heparin which he has tolerated so far without further hemoptysis. Low dose tridil     3/22/23 Awake on tach collar. Denies CP. Less SOB. HCT continues to drop on heparin. No further hemoptysis. ST depression noted on telemetry. Good UOP to lasix 40 mg IV last PM. BNP 2543    3/23/23 LHC - EDP 13, distal LM 30%, mild LAD 90% bifurcation lesion with D1, Cx mid 80%, RCA moderate diffuse disease with long 70% and some left to right collateral. All vessels heavily calcified  Reviewed with Dr Malone - poor candidate for CABG. Will discuss staged PCI of LAD/Cx if able to tolerate DAPT without further pulmonary bleeding.    Interval Hx: pt seen in ICU, case d/w [facility] Patel and [place].  No cp/sob.    Tele: SR 80s (personally reviewed and interpreted)          Review of Systems   Reason unable to perform ROS: trach.   Objective:     Vital Signs (Most Recent):  Temp: 98 °F (36.7 °C) (03/24/23 0000)  Pulse: 81 (03/24/23 0817)  Resp: (!) 23 (03/24/23 0817)  BP: 114/72 (03/24/23 0630)  SpO2: (!) 94 % (03/24/23 0817)   Vital Signs (24h Range):  Temp:  [97.2 °F (36.2 °C)-98 °F (36.7 °C)] 98 °F (36.7 °C)  Pulse:  [] 81  Resp:  [12-47] 23  SpO2:  [79 %-100 %] 94 %  BP: ()/(51-98) 114/72     Weight: 68 kg (150 lb)  Body mass index is 22.81 kg/m².     SpO2: (!) 94 %         Intake/Output Summary (Last 24 hours) at 3/24/2023 0921  Last data filed at 3/24/2023 0500  Gross per 24 hour   Intake 1457.77 ml   Output  2275 ml   Net -817.23 ml       Lines/Drains/Airways       Peripherally Inserted Central Catheter Line  Duration             PICC Triple Lumen 03/20/23 2145 right basilic 3 days              Drain  Duration                  Gastrostomy/Enterostomy 01/04/22 Percutaneous endoscopic gastrostomy (PEG)  days              Airway  Duration             Adult Surgical Airway 03/16/23 1014 Bharat Uncuffed 6.0 7 days              Peripheral Intravenous Line  Duration                  Peripheral IV - Single Lumen 03/20/23 1825 22 G Anterior;Right Forearm 3 days                    Physical Exam  Constitutional:       General: He is not in acute distress.     Appearance: He is well-developed. He is ill-appearing. He is not toxic-appearing or diaphoretic.   HENT:      Head: Normocephalic and atraumatic.   Eyes:      General: No scleral icterus.     Extraocular Movements: Extraocular movements intact.      Conjunctiva/sclera: Conjunctivae normal.      Pupils: Pupils are equal, round, and reactive to light.   Neck:      Thyroid: No thyromegaly.      Vascular: No JVD.      Trachea: No tracheal deviation.      Comments: Trach in place  Cardiovascular:      Rate and Rhythm: Normal rate and regular rhythm.      Heart sounds: S1 normal and S2 normal. Heart sounds are distant. No murmur heard.    No friction rub. No gallop.   Pulmonary:      Effort: Pulmonary effort is normal. No respiratory distress.      Breath sounds: Normal breath sounds. No stridor. No wheezing, rhonchi or rales.   Chest:      Chest wall: No tenderness.   Abdominal:      General: There is no distension.      Palpations: Abdomen is soft.   Musculoskeletal:         General: No swelling or tenderness. Normal range of motion.      Cervical back: Normal range of motion and neck supple. No rigidity.      Right lower leg: No edema.      Left lower leg: No edema.   Skin:     General: Skin is warm and dry.      Coloration: Skin is not jaundiced.   Neurological:       General: No focal deficit present.      Mental Status: He is oriented to person, place, and time.      Cranial Nerves: No cranial nerve deficit.   Psychiatric:         Mood and Affect: Mood normal.         Behavior: Behavior normal.       Current Medications:   aspirin  81 mg Oral Daily    atorvastatin  80 mg Per G Tube Daily    ceFEPime (MAXIPIME) IVPB  2 g Intravenous Q8H    clopidogreL  75 mg Oral Daily    ferrous sulfate  1 tablet Per G Tube Daily    furosemide  40 mg Per G Tube BID    glycopyrrolate  1 mg Per G Tube TID    LIDOcaine  2 patch Transdermal Q24H    melatonin  9 mg Per G Tube Nightly    mupirocin   Nasal BID    polyethylene glycol  17 g Oral BID    predniSONE  50 mg Per G Tube Daily    sodium chloride 0.9%  10 mL Intravenous Q6H    vancomycin (VANCOCIN) IVPB  1,000 mg Intravenous Q24H      dexmedeTOMIDine (Precedex) infusion (titrating) 0.2 mcg/kg/hr (03/24/23 0557)    nitroGLYCERIN Stopped (03/24/23 0117)    NORepinephrine bitartrate-D5W Stopped (03/22/23 0627)     sodium chloride, acetaminophen, acetaminophen, albuterol-ipratropium, fentaNYL, guaiFENesin 100 mg/5 ml, HYDROcodone-acetaminophen, hydrOXYzine HCL, naloxone, nitroGLYCERIN, ondansetron, oxyCODONE, Flushing PICC Protocol **AND** sodium chloride 0.9% **AND** sodium chloride 0.9%, Pharmacy to dose Vancomycin consult **AND** vancomycin - pharmacy to dose    Laboratory (all labs reviewed):  CBC:  Recent Labs   Lab 03/21/23  0242 03/22/23  0233 03/22/23  0450 03/23/23  0332 03/24/23  0357   WBC 10.11  10.11 5.13  5.13 6.14 10.27  10.27 9.55   Hemoglobin 7.7 L  7.7 L 6.9 L  6.9 L 7.5 L 7.1 L  7.1 L 7.8 L   Hematocrit 25.5 L  25.5 L 20.9 L  20.9 L 23.1 L 21.2 L  21.2 L 24.0 L   Platelets 191  191 163  163 199 185  185 217       CHEMISTRIES:  Recent Labs   Lab 05/10/22  1031 05/11/22  0412 05/12/22  0343 05/14/22  0724 05/17/22  1007 08/24/22  1000 03/19/23  0413 03/20/23  0205 03/21/23  0242 03/22/23  0233  03/23/23  0332 03/24/23  0357   Glucose 130 H 103 110 96 162 H   < > 132 H 146 H 177 H 181 H 178 H  --    Sodium 140 139 135 L 136 133 L   < > 144 143 139 137 135 L  --    Potassium 4.6 5.0 4.4 4.1 5.0   < > 4.8 4.2 3.7 4.2 3.8  --    BUN 22 26 H 24 H 18 15   < > 44 H 25 H 19 24 H 33 H  --    Creatinine 0.8 0.7 0.7 0.7 0.7   < > 0.7 0.6 0.7 0.8 0.7  --    eGFR if non African American >60.0 >60.0 >60.0 >60 >60.0  --   --   --   --   --   --   --    eGFR  --   --   --   --   --    < > >60 >60 >60 >60 >60  --    Calcium 9.7 9.7 9.1 9.6 9.5   < > 8.2 L 8.4 L 8.3 L 8.1 L 8.0 L  --    Magnesium  --  2.3 2.0  --  2.0   < >  --  2.0 1.8 1.9 2.1 2.2    < > = values in this interval not displayed.       CARDIAC BIOMARKERS:  Recent Labs   Lab 09/14/22  0459 03/18/23  1106 03/19/23  1308 03/19/23  1840 03/20/23  1642   Troponin I 0.010 0.010 1.736 H 2.222 H 3.416 H       COAGS:  Recent Labs   Lab 01/04/22  0405 01/12/22  0510 09/14/22  0641 03/18/23  1106 03/20/23  1744   INR 0.9 0.9 1.0 1.0 1.0       LIPIDS/LFTS:  Recent Labs   Lab 09/23/22  1247 09/30/22  0317 11/23/22  0733 03/18/23  1106 03/19/23  0413   AST 13 16 17 16 22   ALT 13 16 14 16 15       BNP:  Recent Labs   Lab 05/14/22  0725 09/14/22  0459 09/23/22  1247 03/18/23  1106 03/21/23  1525   BNP 98 185 H 468 H 136 H 2,543 H       TSH:  Recent Labs   Lab 02/16/22  1157 08/24/22  1000 11/23/22  0733 01/24/23  1105 03/19/23  1308   TSH 1.332 2.279 4.924 H 4.861 H 2.787       Free T4:  Recent Labs   Lab 11/23/22  0733 01/24/23  1105   Free T4 0.86 0.90       Diagnostic Results:  Echo: 3/20/23   The left ventricle is normal in size with concentric hypertrophy and normal systolic function.   The estimated ejection fraction is 60%. Inferobasal hypokinesis   Indeterminate left ventricular diastolic function.   Normal right ventricular size with normal right ventricular systolic function.   Mild left atrial enlargement.   Mild right atrial enlargement.   Normal central  venous pressure (3 mmHg).   The estimated PA systolic pressure is 13 mmHg.   The sinuses of Valsalva is mildly dilated. 4.2cm.    Cath: 3/23/23 (images personally reviewed and interpreted)  3/23/23 C - EDP 13, distal LM 30%, mild LAD 90% bifurcation lesion with D1, Cx mid 80%, RCA moderate diffuse disease with long 70% and some left to right collateral. All vessels heavily calcified  Reviewed with Dr Malone - poor candidate for CABG. Will discuss staged PCI of LAD/Cx if able to tolerate DAPT without further pulmonary bleeding      Assessment and Plan:     * Hypotension  Possibly from bleeding.  Resolved.    Squamous cell cancer of tongue  Per IM    Hemoptysis  Per pulmonary. No further bleeding noted.  Tolerating DAPT.    Coronary artery disease involving native coronary artery of native heart without angina pectoris  Remote Hx PCI to RCA 2000. Troponin up to 2.2. Denies CP. EKG with mild NSSTT changes. Likely demand ischemia from hypotension and anemia. Poor candidate for anticoagulation or [facility] with high bleeding risk and hemoptysis. Check echo - if stable ok for out patient ischemic evaluation when able to tolerate anticoagulation  3/21/23 Troponin increased to 3. Had rate related LBBB that has since resolved. Denies CP. EF 60% on echo with inferobasal HK. Tolerating heparin so far.   3/23/23: cath with prox LAD/D1 (?culprit), LCx and RCA stenoses.  Poor surgical candidate.    3/24/23: no further CP.  Tolerating DAPT.  Cont statin.  Will resume heparin IV.  Will discuss possible high risk PCI with wife when she arrives.    Primary hypertension  Cont med rx as tolerated.    Other hyperlipidemia  Cont atorva 80mg  Check lipids/LFT 6 months (Sept 2023)    Acute blood loss anemia  As above  HGB stable    Acute on chronic respiratory failure with hypoxia  Mgmt per IM/pulm    Tracheostomy present  Per ENT/pulm    Elevated brain natriuretic peptide (BNP) level  Does not appear grossly vol overloaded at  present.        VTE Risk Mitigation (From admission, onward)         Ordered     heparin 25,000 units in dextrose 5% 250 ml (100 units/mL) infusion MINIMAL INTENSITY nomogram - [facility]  Continuous        Question Answer Comment   Heparin Infusion Adjustment (DO NOT MODIFY ANSWER) [email or web address]  INTENSITY nomogram for [facility] [email or web address]    Begin at (in units/kg/hr) 12        03/24/23 0950     IP VTE HIGH RISK PATIENT  Once         03/18/23 1620     Place sequential compression device  Until discontinued         03/18/23 1620     Place NIKITA hose  Until discontinued         03/18/23 1620              Pt seen in ICU, critical care time 50min.    Tim Bhatt MD  Cardiology  [facility] - [place] - Intensive Care

## 2023-03-24 NOTE — PHYSICIAN QUERY
PT Name: Fareed Richard Jr.  MR #: [id number]     DOCUMENTATION CLARIFICATION      CDS/: Autumn Cha RN, BSN              Contact information: [email or web address]   This form is a permanent document in the medical record.    Query Date: March 24, 2023    By submitting this query, we are merely seeking further clarification of documentation to reflect the severity of illness of your patient. Please utilize your independent clinical judgment when addressing the question(s) below.     The Medical Record contains the following:   Indicators   Supporting Clinical Findings Location in Medical Record   x Chest Pain, Angina I am concerned that his shoulder pain may be an anginal equivalent    radiating across back which has been unreleaved with oxycodone and lidoerm patches.  He was given morphine    On nitro drip for chest pain. - having difficulty weaning off.   Care Update, Dr Escoto, 3/20          PN, 3/24   x Coronary Artery Disease Coronary artery disease involving native coronary artery of native heart without angina pectoris   PN, 3/24   x EKG EKG shows significant changes - now sinus tachycardia but with a wide complex which is new.    ST depression noted on telemetry         Care Update, Dr Escoto, 3/20    Cardiology, 3/22      EKG,3/20       x Troponin  03/18/23 11:06 03/19/23 13:08 03/19/23 18:40 03/20/23 16:42   Troponin I 0.010 1.736 (H) 2.222 (H) 3.416 (H)    LAB           x Echo Results The left ventricle is normal in size with concentric hypertrophy and normal systolic function.  The estimated ejection fraction is 60%. Inferobasal hypokinesis  Indeterminate left ventricular diastolic function.  Normal right ventricular size with normal right ventricular systolic function.  Mild left atrial enlargement.  Mild right atrial enlargement.  Normal central venous pressure (3 mmHg).  The estimated PA systolic pressure is 13 mmHg.  The sinuses of Valsalva is mildly dilated.      Echo, 3/20    x Angiography  ""3/23/23 [facility] - EDP 13, distal LM 30%, mild LAD 90% bifurcation lesion with D1, Cx mid 80%, RCA moderate diffuse disease with long 70% and some left to right collateral. All vessels heavily calcified   PN, 3/24   x Documentation of acute cardiac condition NSTEMI    Coronary artery disease involving native coronary artery of native heart without angina pectoris  -Cardiology suspects this is due to demand ischemia associated with his anemia and hypotension.     Care Update, Dr Escoto, 3/20    PN, 3/24   x Medication/Treatment -Await repeat cxr and troponin  -Order PICC line  -Start heparin and nitroglycerin drip as stopgap measures for suspected ACS as coronary angiogram is not an option at this time.  -Give IV lasix x 1 dose now.  -Continue close monitoring in ICU  -Patient is full code.     Holding aspirin given hemoptysis and metoprolol given hypotension  -Continue statin.  Resume BB as able. On nitro drip for chest pain. - having difficulty weaning off. He is currently cleared for DAPT as no further bleeding issues at this time. Updated Cardiology.     -Patient underwent C/angiogram on 2/23  poor candidate for CABG. Will discuss staged PCI of LAD/Cx if able to tolerate DAPT without further pulmonary bleeding   Care Update, Dr Escoto, 3/20                PN, 3/22        PN, 3/24    Other        Provider, please clarify the NSTEMI diagnosis related to the above documentation:  [  x ] NSTEMI Ruled In     [   ] NSTEMI Ruled Out      [   ] Other Cardiac Diagnosis (please specify): _______________         Please document in your progress notes daily for the duration of treatment until resolved, and include in your discharge summary.    Form No. [id number]  "

## 2023-03-24 NOTE — ASSESSMENT & PLAN NOTE
-Patient with acute on chronic hypoxic respiratory failure  -At home is on 5L O2 via trach and O2 sats typically in high 80s low 90s  -Sats presently similar, but is having episodes of hemoptysis  -Acute etiology is likely due to pneumonia, mucus plugging and hemoptysis.  -Consulted pulm-critical care and input appreciated  -Discussed with ENT on call at [facility] 3/19 and OK to keep at [facility]. Dr. French evaluated.  -Continue antibiotics as above.  -Continue supplemental O2 via Trach and wean as able. Placed on mechanical ventilation on 3/21 for respiratory distress and increasing oxygen requirements.  Slowly weaning at this time.  Appears more comfortable today. Weaned off vent on 3/23.    3 mo f/up please

## 2023-03-24 NOTE — ASSESSMENT & PLAN NOTE
-Hb on admit 7.6 and this morning 8.0  -Baseline Hb 8-9.  -Presented w intermittent hemoptysis via trach x 2 days. Had another episode on morning of 3/19/2023  -Seen at ENT 2 days prior to admit (Dr. Smalls) with exam without source. Trache exchanged at that time.   -Ferritin only 84 and Tsat 18% - consistent with iron deficiency  -Platelets and PT/INR are normal.  -Treated with TXA nebulizer and bleeding has stopped  -Started FeSO4 which he will need at discharge  -Repeat cbc in AM. Hb 7.7 but stable, no further evidence of bleeding. Again 7.1 but no further bleeding.

## 2023-03-24 NOTE — ASSESSMENT & PLAN NOTE
- anxiety which worsens respiratory symptoms, sometimes to the point of acute distress   - pt and wife confirm symptoms of anxiety affecting daily life; takes hydroxyzine HS for this and related insomnia; has required additional day time doses of Hydroxyzine to assist with anxiety symptoms  - hydroxyzine now available PRN through out the day as well; advised administration is timed to allow for HS dosing to assist with insomnia   - also discussed symptoms depression; recommendation for SSRI to help manage both anxiety and depression symptoms; will allow for cardiac procedure and ensure pt is no longer experiencing/nausea or vomiting before starting   - discussed role of home/clinic palliative in additional management of medication regimen and symptoms   - 3/23 overnight had severe episode of anxiety which ativan was given to attempt to relieve; post ativan became combative/disoriented requiring restraints and precedex; per wife similar episode with only other known time pt received ativan  - pt now very lethargic; precidex discontinued; discussed plan for improving anxiety with hospital primary, plan to allow pt to awaken more to assess/treat by primary over the weekend   - plan discussed with hospital primary

## 2023-03-24 NOTE — ASSESSMENT & PLAN NOTE
"-Denies chest pain but on 3/19 did have unexplained hypotension with severe back/shoulder pain  -Troponin on admit normal, but on 3/19 bumped to 1.7 and then 2.2 on 3/20.  -No jamil ischemic change on EKG  -Echo showed EF 60%, inferobasal hypokinesis, indeterminate diastolic function  -Cardiology consulted and input appreciated  -Cardiology suspects this is due to demand ischemia associated with his anemia and hypotension.  Further, he is a poor candidate for angiogram given his hemoptysis and inability to treat with blood thinning medications.  Recommended outpatient stress testing  -Holding aspirin given hemoptysis and metoprolol given hypotension  -Continue statin.  Resume BB as able. On nitro drip for chest pain. - having difficulty weaning off. He is currently cleared for DAPT as no further bleeding issues at this time. Updated Cardiology.   -Patient underwent [facility]/angiogram on 2/23 - findings below.  "3/23/23 [facility] - EDP 13, distal LM 30%, mild LAD 90% bifurcation lesion with D1, Cx mid 80%, RCA moderate diffuse disease with long 70% and some left to right collateral. All vessels heavily calcified     Reviewed with Dr Malone - poor candidate for CABG. Will discuss staged PCI of LAD/Cx if able to tolerate DAPT without further pulmonary bleeding"    - Currently on ASA/Plavix and low dose heparin infusion. Cardiology discussing intervention with patient and wife today. No intervention planned for today.    "

## 2023-03-24 NOTE — PROGRESS NOTES
2320 Pt on ventilator with minute volume alarm. Respiratory notified and at bedside. Pt suctioned, trach checked for cuff pressure. Trachea midline.  Pt become very agitated, face was red and pt was fighting staff. Dr. Raudel REDMAN notified and orders received for ativan.  2328 ativan administered     0334 Pt restless, removes oxygen mask as well as o2 sensor, bp cuff. Pt is oriented to person, place, time and situation but when asked if he is confused, he answers yes. Pt has been redirected multiple times tonight due to removing monitoring and oxygen equipment. Blood glucose within normal limits.  Afebrile, pupils equal and reactive, no focal deficits noted with purposeful and spontaneous movement. Increasing oxygen demands. EICU Dr. Anaya notified, see new orders.     0420 Pt removed monitoring leads and stood on side of bed. Pt become very agitated when attempting to help him sit at bedside to prevent fall. Pt again able to answer orientation questions appropriately but states he wants to go home. Pt educated on importance of leaving monitoring equipment and oxygen in place. Pt able to be coaxed back to bed where he was bathed and placed back on monitor. Small bleeding skin tear noted to right elbow, bandage placed.     0542 Pt still agitated, removing bp cuff and oxygen mask. Pt pulling on peg tube. Pt educated on importance of not pulling at lines, but refusing education at this time. EICU dr Anaya notified    Pt with increasing oxygen while at rest. Dr Anaya notified and orders received.

## 2023-03-24 NOTE — SUBJECTIVE & OBJECTIVE
Interval History:  patient had rough night, agitated and attempting to remove lines. Placed on precedex. More calm this morning. No chest pain, off nitro    Objective:     Vital Signs (Most Recent):  Temp: 98.1 °F (36.7 °C) (03/24/23 0700)  Pulse: 84 (03/24/23 1030)  Resp: 20 (03/24/23 1030)  BP: (!) 89/53 (03/24/23 1030)  SpO2: (!) 94 % (03/24/23 1030)   Vital Signs (24h Range):  Temp:  [97.2 °F (36.2 °C)-98.1 °F (36.7 °C)] 98.1 °F (36.7 °C)  Pulse:  [] 84  Resp:  [12-47] 20  SpO2:  [79 %-100 %] 94 %  BP: ()/() 89/53     Weight: 68 kg (150 lb)  Body mass index is 22.81 kg/m².    Intake/Output Summary (Last 24 hours) at 3/24/2023 1135  Last data filed at 3/24/2023 0500  Gross per 24 hour   Intake 1457.77 ml   Output 1675 ml   Net -217.23 ml        Physical Exam  Vitals and nursing note reviewed.   Constitutional:       General: He is not in acute distress.     Appearance: He is well-developed. He is ill-appearing. He is not diaphoretic.   HENT:      Head: Normocephalic and atraumatic.   Eyes:      General:         Right eye: No discharge.         Left eye: No discharge.      Conjunctiva/sclera: Conjunctivae normal.   Neck:      Thyroid: No thyromegaly.      Comments: Trach in place.  No evidence of bleeding now.  Cardiovascular:      Rate and Rhythm: Normal rate and regular rhythm.      Heart sounds: No murmur heard.  Pulmonary:      Effort: Pulmonary effort is normal. No respiratory distress.      Breath sounds: Normal breath sounds.      Comments: Now on trach collar  Abdominal:      General: Bowel sounds are normal. There is no distension.      Palpations: Abdomen is soft. There is no mass.      Tenderness: There is no abdominal tenderness.      Comments: Peg tube in place   Musculoskeletal:         General: No deformity.      Cervical back: Normal range of motion and neck supple.   Skin:     General: Skin is warm and dry.   Neurological:      Mental Status: He is alert and oriented to person,  place, and time.      Comments: Alert and awakens appropriately, moves all extremities At baseline.   Psychiatric:      Comments: Anxious       Significant Labs: All pertinent labs within the past 24 hours have been reviewed.    Significant Imaging: I have reviewed all pertinent imaging results/findings within the past 24 hours.    Review of Systems

## 2023-03-24 NOTE — ASSESSMENT & PLAN NOTE
"-Denies chest pain but on 3/19 did have unexplained hypotension with severe back/shoulder pain  -Troponin on admit normal, but on 3/19 bumped to 1.7 and then 2.2 on 3/20.  -No jamil ischemic change on EKG  -Echo showed EF 60%, inferobasal hypokinesis, indeterminate diastolic function  -Cardiology consulted and input appreciated  -Cardiology suspects this is due to demand ischemia associated with his anemia and hypotension.  Further, he is a poor candidate for angiogram given his hemoptysis and inability to treat with blood thinning medications.  Recommended outpatient stress testing  -Holding aspirin given hemoptysis and metoprolol given hypotension  -Continue statin.  Resume BB as able. On nitro drip for chest pain. - having difficulty weaning off. He is currently cleared for DAPT as no further bleeding issues at this time. Updated Cardiology.   -Patient underwent LHC/angiogram on 2/23 - findings below.  "3/23/23 [facility] - EDP 13, distal LM 30%, mild LAD 90% bifurcation lesion with D1, Cx mid 80%, RCA moderate diffuse disease with long 70% and some left to right collateral. All vessels heavily calcified     Reviewed with Dr Malone - poor candidate for CABG. Will discuss staged PCI of LAD/Cx if able to tolerate DAPT without further pulmonary bleeding"    - continue with asa/plavix and wean nitro as tolerated    "

## 2023-03-24 NOTE — PLAN OF CARE
Problem: Restraint, Nonbehavioral (Nonviolent)  Goal: Absence of Harm or Injury  Outcome: Ongoing, Progressing     Problem: Skin and Tissue Injury (Artificial Airway)  Goal: Absence of Device-Related Skin or Tissue Injury  Outcome: Ongoing, Not Progressing     Problem: Noninvasive Ventilation Acute  Goal: Effective Unassisted Ventilation and Oxygenation  Outcome: Ongoing, Not Progressing

## 2023-03-24 NOTE — ASSESSMENT & PLAN NOTE
Palliative Encounter  Date: 3/23/2023  - interval chart reviewed in detail; discussed pt with ICU MDT Rounds   - pt very lethargic today post precedex (see anxiety); met with wife at bedside and discussed overnight events; wife does not wish for pt to receive ativan again in the future  - continue GOC/ACP discussion; awaiting discussing cardiac intervention options with cardiology   - wife continues to support pt's wishes for full treatment; she plans to take leave from work at time of discharge to care for pt     - re-discussed continued OP follow up with palliative medicine recommendation; wife prefers home palliative as opposed to clinic   - will discuss with CM/SW home palliative information session coordination with a hospice provider that also has hospice for future continuity of care; pt initially mistrusting of palliative medicine but is now agreeable; feel building a relationship with a palliative provider will help to establish trust for future care needs if/when hospice would be needed   - emotional support provided to wife; answered all questions   - discussed with hospital primary   Palliative Encounter  Date: 3/23/2023  - interval chart reviewed in detail; discussed pt with ICU MDT Rounds   - met with pt and wife at bedside for planned GOC/ACP discussion; pt with improved calmness related to this discussion; reassured pt at anytime if there was a topic he did not wish to discuss, or wished to stop meeting to let me know   - introduction to palliative medicine and role in current/future care provided to pt's wife  - discussed previously stated goals (as discussed with Dr. Escoto); pt confirms confirmed continued desire for full code and full treatment; reassured these wishes are and will be respected; encouraged that if at anytime those wishes were to change that it could be discussed further at that time   - allowed pt and wife to share long road of cancer treatment and how this affects current GOC   -  plan now in place for cardiac cath procedure today; briefly discussed  - discussed pt's symptoms of anxiety; wife also provided concerns of pt having a more depressed mood; validated anxiety and depression symptoms given pt's condition and other symptoms; discussed correlation of anxiety and respiratory symptoms; discussed options for anxiety management (see anxiety)   - pt's wife shared pt's fear of requiring a nursing home as likely source of anxiety related to previous visits; pt advocates that he would never wish to live in a  NH; wife is primary care giver and confirms they have a lot of equipment at home to help care for pt, and she is well versed on his care needs; bedside RN shared wife's concern for needed feeding supplies and requesting CM/SW assistance prior to discharge; CM/SW consult placed   - recommended home palliative or palliative clinic for continued palliative support, symptom management, and ACP discussions; they are agreeable to this; plan for follow up to determine if home palliative or clinic preferred  - emotional support provided; allowed time for questions/concerns, all addressed   - hospital primary updated    Palliative Consult  Date: 3/22/2023 (date corrections to initial consult)   - consult received; interval chart reviewed in detail; discussed pt during ICU MDT rounds   - met with patient at ICU bedside; introduction to palliative medicine team and role in current care and admission  - initial consult on 3/21, but pt's condition did not allow for GOC discussion, post poned as to not cause increased anxiety and respiratory compromise; previous detailed GOC/ACP discussion with, then primary, Dr. Escoto   - Assessed pt and discussed plan to have GOC/ACP discussion with wife present later in the day  - as time for planned discussion neared, pt became very anxious of pending discussion; discussed role of palliative and assured that I do not wish to cause pt anxiety about discussions, and  he can request to not discuss at all; explained my intentions of meeting and that I would not try to persuade him into any plans that do not align with his GOC   - validated all the hard work and things pt has overcome in his cancer treatment   - pt seemed very relieved by above discussion and agreeable for further meeting on 3/23, with wife present   - emotional support provided; allowed time for questions/concerns, all addressed   - MDT updated, and ongoing communication with MDT

## 2023-03-24 NOTE — SUBJECTIVE & OBJECTIVE
Past Medical History:   Diagnosis Date    Cancer     skin to Rt forearm and face    COPD (chronic obstructive pulmonary disease)     Hyperlipidemia     Hypertension     Pseudoaneurysm      Past Surgical History:   Procedure Laterality Date    ABDOMINAL SURGERY      stents placed in liver and large intestines, per patient    CAROTID STENT      COLONOSCOPY N/A 09/27/2022    Procedure: COLONOSCOPY;  Surgeon: Donnie Peterson MD;  Location: [facility] ENDO (2ND FLR);  Service: Endoscopy;  Laterality: N/A;    COLONOSCOPY N/A 09/30/2022    Procedure: COLONOSCOPY;  Surgeon: Joy Cabrera MD;  Location: [facility] ENDO (2ND FLR);  Service: Endoscopy;  Laterality: N/A;    CORONARY STENT PLACEMENT  01/2000    DISSECTION OF NECK Bilateral 01/04/2022    Procedure: DISSECTION, NECK;  Surgeon: Naeem Smalls MD;  [facility];  Service: ENT;  Laterality: Bilateral;    ESOPHAGOGASTRODUODENOSCOPY N/A 09/27/2022    Procedure: EGD (ESOPHAGOGASTRODUODENOSCOPY);  Surgeon: Donnie Peterson MD;  Location: [facility] ENDO (2ND FLR);  Service: Endoscopy;  Laterality: N/A;    ESOPHAGOGASTRODUODENOSCOPY N/A 09/30/2022    Procedure: EGD (ESOPHAGOGASTRODUODENOSCOPY);  Surgeon: Joy Cabrera MD;  Location: [facility] ENDO (2ND FLR);  Service: Endoscopy;  Laterality: N/A;    ESOPHAGOGASTRODUODENOSCOPY W/ PEG N/A 01/04/2022    Procedure: EGD, WITH PEG TUBE INSERTION;  Surgeon: Anthony Calabrese MD;  [facility];  Service: General;  Laterality: N/A;    EYE SURGERY      Cataract bilateral    femoral stents      bilateral    FLAP PROCEDURE N/A 01/03/2022    Procedure: CREATION, FREE FLAP;  Surgeon: Naeem Smalls MD;  [facility];  Service: ENT;  Laterality: N/A;    FLAP PROCEDURE Right 01/04/2022    Procedure: CREATION, FREE FLAP;  Surgeon: Stacey Conde MD;  [facility];  Service: ENT;  Laterality: Right;  Ischemic start 1203  Ischemic stop 1353    GLOSSECTOMY N/A 01/04/2022    Procedure: GLOSSECTOMY;   Surgeon: Naeem Smalls MD;  Location: [facility] OR [address] [place] [address];  Service: ENT;  Laterality: N/A;    RADICAL NECK DISSECTION Left 01/03/2022    Procedure: DISSECTION, NECK, RADICAL;  Surgeon: Naeem Smalls MD;  [facility];  Service: ENT;  Laterality: Left;    SKIN BIOPSY      SKIN CANCER EXCISION      TRACHEOTOMY N/A 01/04/2022    Procedure: TRACHEOTOMY;  Surgeon: Naeem Smalls MD;  Location: [facility] OR [address] [place] [address];  Service: ENT;  Laterality: N/A;    VASCULAR SURGERY       Review of patient's allergies indicates:  No Known Allergies    Medications:  Continuous Infusions:   dexmedeTOMIDine (Precedex) infusion (titrating) 0.2 mcg/kg/hr (03/24/23 0557)    heparin (porcine) in D5W 12 Units/kg/hr (03/24/23 1023)    nitroGLYCERIN Stopped (03/24/23 0117)    NORepinephrine bitartrate-D5W Stopped (03/22/23 0627)     Scheduled Meds:   aspirin  81 mg Oral Daily    atorvastatin  80 mg Per G Tube Daily    ceFEPime (MAXIPIME) IVPB  2 g Intravenous Q8H    clopidogreL  75 mg Oral Daily    ferrous sulfate  1 tablet Per G Tube Daily    furosemide  40 mg Per G Tube BID    glycopyrrolate  1 mg Per G Tube TID    LIDOcaine  2 patch Transdermal Q24H    melatonin  9 mg Per G Tube Nightly    mupirocin   Nasal BID    polyethylene glycol  17 g Oral BID    predniSONE  50 mg Per G Tube Daily    sodium chloride 0.9%  10 mL Intravenous Q6H    vancomycin (VANCOCIN) IVPB  1,000 mg Intravenous Q24H     PRN Meds:sodium chloride, acetaminophen, acetaminophen, albuterol-ipratropium, fentaNYL, guaiFENesin 100 mg/5 ml, HYDROcodone-acetaminophen, hydrOXYzine HCL, naloxone, nitroGLYCERIN, ondansetron, oxyCODONE, Flushing PICC Protocol **AND** sodium chloride 0.9% **AND** sodium chloride 0.9%, Pharmacy to dose Vancomycin consult **AND** vancomycin - pharmacy to dose    Family History    None       Tobacco Use    Smoking status: Former     Packs/day: 2.00     Years: 40.00     Pack years: 80.00     Types: Cigarettes     Start date:  1963     Quit date: 2018     Years since quittin.9    Smokeless tobacco: Never    Tobacco comments:     3/3 ppd x 40 yrs. Currently 3-4 cigarettes daily .He is trying  to quit. Is using a Vapor cigarettes  2-3 x's a day.   Substance and Sexual Activity    Alcohol use: Not Currently    Drug use: No    Sexual activity: Not Currently       Objective:     Vital Signs (Most Recent):  Temp: 98.1 °F (36.7 °C) (23 0700)  Pulse: 74 (23 1157)  Resp: 20 (23 1157)  BP: (!) 89/53 (23 1030)  SpO2: (!) 94 % (23 1157)   Vital Signs (24h Range):  Temp:  [97.2 °F (36.2 °C)-98.1 °F (36.7 °C)] 98.1 °F (36.7 °C)  Pulse:  [] 74  Resp:  [12-47] 20  SpO2:  [79 %-100 %] 94 %  BP: ()/() 89/53     Weight: 68 kg (150 lb)  Body mass index is 22.81 kg/m².    Physical Exam  Constitutional:       General: He is sleeping. He is not in acute distress.     Appearance: He is ill-appearing.   HENT:      Head: Atraumatic.      Comments: History of facial/oral reconstruction   Pulmonary:      Effort: Pulmonary effort is normal.      Comments: Trach  Abdominal:      Comments: PEG   Musculoskeletal:      Right lower leg: No edema.      Left lower leg: No edema.   Neurological:      Mental Status: He is oriented to person, place, and time. He is lethargic.      Comments: Sleeping; arousable/follows commands    Psychiatric:      Comments: Frustration with difficulties with communication        Advance Care Planning   Advance Directives:   Living Will: No    La[place]: No    Do Not Resuscitate Status: No    Medical Power of : No      Decision Making:  Patient answered questions and Family answered questions  Goals of Care: What is most important right now is to focus on spending time at home, remaining as independent as possible, symptom/pain control, curative/life-prolongation (regardless of treatment burdens). Accordingly, we have decided that the best plan to meet the patient's goals  includes continuing with treatment.       Significant Labs: All pertinent labs within the past 24 hours have been reviewed.  CBC:   Recent Labs   Lab 03/24/23  0357   WBC 9.55   HGB 7.8*   HCT 24.0*   MCV 90          BMP:  Recent Labs   Lab 03/24/23  0357   MG 2.2       LFT:  Lab Results   Component Value Date    AST 22 03/19/2023    ALKPHOS 65 03/19/2023    BILITOT 0.4 03/19/2023     Albumin:   Albumin   Date Value Ref Range Status   03/23/2023 2.2 (L) 3.5 - 5.2 g/dL Final     Protein:   Total Protein   Date Value Ref Range Status   03/19/2023 5.5 (L) 6.0 - 8.4 g/dL Final     Lactic acid:   Lab Results   Component Value Date    LACTATE 1.8 03/19/2023    LACTATE 0.8 05/14/2022       Significant Imaging: I have reviewed all pertinent imaging results/findings within the past 24 hours.

## 2023-03-24 NOTE — ASSESSMENT & PLAN NOTE
Remote Hx PCI to RCA 2000. Troponin up to 2.2. Denies CP. EKG with mild NSSTT changes. Likely demand ischemia from hypotension and anemia. Poor candidate for anticoagulation or LHC with high bleeding risk and hemoptysis. Check echo - if stable ok for out patient ischemic evaluation when able to tolerate anticoagulation  3/21/23 Troponin increased to 3. Had rate related LBBB that has since resolved. Denies CP. EF 60% on echo with inferobasal HK. Tolerating heparin so far.   3/23/23: cath with prox LAD/D1 (?culprit), LCx and RCA stenoses.  Poor surgical candidate.    3/24/23: no further CP.  Tolerating DAPT.  Cont statin.  Will resume heparin IV.  Will discuss possible high risk PCI with wife when she arrives.

## 2023-03-24 NOTE — ASSESSMENT & PLAN NOTE
Patient with Hypoxic Respiratory failure which is Acute on chronic.  he is on home oxygen at 2 LPM. Supplemental oxygen was provided and noted- Vent Mode: PS/CPAP  Oxygen Concentration (%):  [40-60] 40  PEEP/CPAP:  [8 cmH20] 8 cmH20  Pressure Support:  [5 cmH20] 5 cmH20  Mean Airway Pressure:  [9.7 cmH20-10.4 cmH20] 9.7 cmH20.   Signs/symptoms of respiratory failure include- tachypnea and increased work of breathing. Contributing diagnoses includes - Aspiration and Pneumonia Labs and images were reviewed. Patient Has not had a recent ABG. Will treat underlying causes and adjust management of respiratory failure as follows   - changed out trach and now placed to ventilator for positive pressure  - had secretions in trach with exchange. Appears to be more comfortable now.

## 2023-03-24 NOTE — PROGRESS NOTES
[facility] Intensive Care  Pulmonology  Progress Note    Patient Name: Fareed Richard Jr.  MRN: [id number]  Admission Date: 3/18/2023  Hospital Length of Stay: 5 days  Code Status: Full Code  Attending Provider: Luiz Patel MD  Primary Care Provider: Kodi Tubbs MD   Principal Problem: Hypotension    Subjective:     Interval History: difficulty with bpap via trache last night.  Precedex started due to agitation.      Objective:     Vital Signs (Most Recent):  Temp: 97.2 °F (36.2 °C) (03/23/23 1100)  Pulse: 77 (03/23/23 1100)  Resp: (!) 27 (03/23/23 1100)  BP: 120/66 (03/23/23 1100)  SpO2: 98 % (03/23/23 1100)   Vital Signs (24h Range):  Temp:  [97.2 °F (36.2 °C)-98.8 °F (37.1 °C)] 97.2 °F (36.2 °C)  Pulse:  [] 77  Resp:  [13-28] 27  SpO2:  [94 %-100 %] 98 %  BP: ()/(51-98) 120/66     Weight: 68 kg (150 lb)  Body mass index is 22.81 kg/m².      Intake/Output Summary (Last 24 hours) at 3/23/2023 1323  Last data filed at 3/23/2023 1100  Gross per 24 hour   Intake 1604.55 ml   Output 1175 ml   Net 429.55 ml         Physical Exam  Vitals and nursing note reviewed.   Constitutional:       General: He is not in acute distress.     Appearance: He is ill-appearing. He is not toxic-appearing.   Neck:      Comments: Tracheostomy in place.  Cardiovascular:      Rate and Rhythm: Normal rate and regular rhythm.      Heart sounds: Normal heart sounds. No murmur heard.    No gallop.   Pulmonary:      Effort: Pulmonary effort is normal.      Breath sounds: No wheezing, rhonchi or rales.   Abdominal:      General: There is no distension.      Palpations: Abdomen is soft.      Tenderness: There is no abdominal tenderness.   Musculoskeletal:      Right lower leg: No edema.      Left lower leg: No edema.   Neurological:      Mental Status: He is alert and oriented to person, place, and time. Mental status is at baseline.   Psychiatric:         Mood and Affect: Mood normal.         Thought Content: Thought content  normal.     Review of Systems   Respiratory:  Negative for shortness of breath.    Cardiovascular:  Negative for chest pain.   Gastrointestinal:  Negative for abdominal distention, abdominal pain, blood in stool and constipation.   Genitourinary:  Negative for difficulty urinating.   Psychiatric/Behavioral:  The patient is nervous/anxious.      Vents:  Vent Mode: PS/CPAP (03/23/23 0744)  Set Rate: 12 BPM (03/22/23 0343)  Vt Set: 370 mL (03/21/23 1615)  Pressure Support: 5 cmH20 (03/23/23 0744)  PEEP/CPAP: 8 cmH20 (03/23/23 0744)  Oxygen Concentration (%): 40 (03/23/23 1100)  Peak Airway Pressure: 14.4 cmH20 (03/23/23 0744)  Total Ve: 11.64 L/m (03/23/23 0744)  F/VT Ratio<105 (RSBI): (!) 37.75 (03/23/23 0744)    Lines/Drains/Airways       Peripherally Inserted Central Catheter Line  Duration             PICC Triple Lumen 03/20/23 2145 right basilic 2 days              Drain  Duration                  Gastrostomy/Enterostomy 01/04/22 Percutaneous endoscopic gastrostomy (PEG)  days              Airway  Duration             Adult Surgical Airway 03/16/23 1014 Shiley Uncuffed 6.0 7 days              Peripheral Intravenous Line  Duration                  Peripheral IV - Single Lumen 03/20/23 1825 22 G Anterior;Right Forearm 2 days                    Significant Labs:    CBC/Anemia Profile:  Recent Labs   Lab 03/22/23  0233 03/22/23  0450 03/23/23  0332   WBC 5.13  5.13 6.14 10.27  10.27   HGB 6.9*  6.9* 7.5* 7.1*  7.1*   HCT 20.9*  20.9* 23.1* 21.2*  21.2*     163 199 185  185   MCV 91  91 91 90  90   RDW 15.2*  15.2* 15.2* 15.1*  15.1*          Chemistries:  Recent Labs   Lab 03/22/23  0233 03/23/23  0332    135*   K 4.2 3.8   CL 97 94*   CO2 33* 32*   BUN 24* 33*   CREATININE 0.8 0.7   CALCIUM 8.1* 8.0*   ALBUMIN  --  2.2*   MG 1.9 2.1   PHOS  --  2.9         All pertinent labs within the past 24 hours have been reviewed.    Significant Imaging:  I have reviewed all pertinent imaging  results/findings within the past 24 hours.      ABG  Recent Labs   Lab 03/24/23  0817   PH 7.436   PO2 32*   PCO2 61.9*   HCO3 41.6*   BE 15     Assessment/Plan:     ENT  Tracheostomy present  Tracheostomy in place since surgical resection for head/neck cancer in January 2022.  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    · Undergoing swallowing evaluation/training as per Speech Therapy  · Cuff trache replaced on 3/22.  Can switch back once patient is done with all intervention.    Pulmonary  Hemoptysis  Likely secondary to lower respiratory tract infection in the setting of ASA/Plavix.  It seems less likely to be physical complication of the tracheostomy tube itself.    · Antibiotics for pneumonia.  · Check sputum for culture, including AFB for possible MAC infection.  · Respiratory viral panel.  · OK to resume anti-platelet medications; monitor for bleeding   · No need for additional nebulized TXA.  No bleeding since 3/19  · Appreciate ENT evaluation     Acute on chronic respiratory failure with hypoxia  Patient with Hypoxic Respiratory failure which is Acute on chronic.  he is on home oxygen at 2-3 LPM. Supplemental oxygen was provided and noted- Vent Mode: PS/CPAP  Oxygen Concentration (%):  [40] 40  Resp Rate Total:  [15 br/min-18 br/min] 18 br/min  PEEP/CPAP:  [8 cmH20] 8 cmH20  Pressure Support:  [5 cmH20] 5 cmH20  Mean Airway Pressure:  [9.8 cmH20-9.9 cmH20] 9.8 cmH20.   Signs/symptoms of respiratory failure include- increased work of breathing and hemoptysis. Contributing diagnoses includes - Aspiration and Pneumonia Labs and images were reviewed. Patient Has not had a recent ABG. Will treat underlying causes and adjust management of respiratory failure as follows-     Hemoptysis with increased bilateral infiltrates on chest CTA in the setting of likely aspiration (attempting swallow training with Speech) and chronic  aspirin/Plavix.  Although procalcitonin is normal, imaging and history warrant treatment with antibiotics for presumed nosocomial pneumonia.  Moved to ICU due to labile BP and increased oxygen requirements.  After receiving one unit of packed red blood cells, he looks much better and BP is improved.    · Treat for pneumonia (nosocomial pathogens)  · Nebulized bronchodilators given COPD/emphysema changes  · Trach changed to 6.0 cuffed shiley on 3/22 to facilitate ventilatory support after likely aspiration event  · Sputum ordered, follow up results  · Titrate oxygen as needed; wean back to trach collar as tolerated  · No further bleeding noted, no need to continue TXA nebulizer treatments for hemoptysis. Hgb remains stable on heparin gtt.   · Tolerated DAPT.  · Cuff deflated for comfort.  Ok to bypass bipap at night.      Renal/  UTI (urinary tract infection)  Enterococcus in the urine.    - s/p 6 days of gris Mcmanus MD  Pulmonology  [facility] - Intensive Care       [place].

## 2023-03-24 NOTE — PROGRESS NOTES
[facility] - Intensive Care  Palliative Medicine  Progress Note    Patient Name: Fareed Richard Jr.  MRN: [id number]  Admission Date: 3/18/2023  Hospital Length of Stay: 5 days  Code Status: Full Code   Attending Provider: Luiz Patel MD  Consulting Provider: Trupti Beck NP  Primary Care Physician: Kodi Tubbs MD  Principal Problem:Hypotension    Patient information was obtained from spouse/SO and primary team.      Assessment/Plan:   Advance Care Planning     Date:Psychiatric  Anxiety  - anxiety which worsens respiratory symptoms, sometimes to the point of acute distress   - pt and wife confirm symptoms of anxiety affecting daily life; takes hydroxyzine HS for this and related insomnia; has required additional day time doses of Hydroxyzine to assist with anxiety symptoms  - hydroxyzine now available PRN through out the day as well; advised administration is timed to allow for HS dosing to assist with insomnia   - also discussed symptoms depression; recommendation for SSRI to help manage both anxiety and depression symptoms; will allow for cardiac procedure and ensure pt is no longer experiencing/nausea or vomiting before starting   - discussed role of home/clinic palliative in additional management of medication regimen and symptoms   - 3/23 overnight had severe episode of anxiety which ativan was given to attempt to relieve; post ativan became combative/disoriented requiring restraints and precedex; per wife similar episode with only other known time pt received ativan  - pt now very lethargic; precidex discontinued; discussed plan for improving anxiety with hospital primary, plan to allow pt to awaken more to assess/treat by primary over the weekend   - plan discussed with hospital primary       Pulmonary  Acute on chronic respiratory failure with hypoxia  - trach; now off vent   - PCCM consulted/following   - noted; management per hospital primary and PCCM     Cardiac/Vascular  Coronary artery  Writer speaks with in home  Laura Tamayo who confirms aforementioned information. She states that Pt was reported as a runaway to officer Alvaro Tracey when he happened to walk past Laura's office and she intervened and took him home. She notes that Pt is very system savvy and knows what he needs to do to get what he wants. She notes that his [place]  [facility]aven planned to call [facility] and have him taken into secure long term. Writer informs Laura that haven has stated now she will not do this and Laura questions if this is a jurisdictional issue as Pt is technically placed through [facility]. Laura states that she has no ability to place Pt anywhere as that is Haven's role and Pt's role is to facilitate placement and be of assistance in the home. Laura unable to offered further solutions.    Leticia Rodrigues MSW, APSW     disease involving native coronary artery of native heart without angina pectoris  - cardiology consulted and following   - Echo showed EF 60%, inferobasal hypokinesis, indeterminate diastolic function  - nitro drip weaning; pt denying any continued angina/pain   - cardiac cath procedure on 3/23; per cardiology poor CABG candidate; potential high risk PCI in the future, cardiology plans to discuss with wife   - pt previously agreeable to any recommended cardiac procedures at this time   - noted; management per hospital primary, PCCM, and Cardiology     Oncology  Squamous cell cancer of tongue  -Diagnosed 12/15/21 via FNA; Underwent total glossectomy, bilateral neck dissection, bilateral cervical facial flaps and anterolateral thigh flap reconstruction of glossectomy defect; Completed adjuvant chemoradiation  -Followed by oncology and ENT outpt. No longer on chemo or radiation.   - has a trach since above treatment   - noted; management per hospital primary     Palliative Care  ACP (advance care planning)  Palliative Encounter  Date: 3/23/2023  - interval chart reviewed in detail; discussed pt with ICU MDT Rounds   - pt very lethargic today post precedex (see anxiety); met with wife at bedside and discussed overnight events; wife does not wish for pt to receive ativan again in the future  - continue GOC/ACP discussion; awaiting discussing cardiac intervention options with cardiology   - wife continues to support pt's wishes for full treatment; she plans to take leave from work at time of discharge to care for pt     - re-discussed continued OP follow up with palliative medicine recommendation; wife prefers home palliative as opposed to clinic   - will discuss with CM/SW home palliative information session coordination with a hospice provider that also has hospice for future continuity of care; pt initially mistrusting of palliative medicine but is now agreeable; feel building a relationship with a palliative provider  will help to establish trust for future care needs if/when hospice would be needed   - emotional support provided to wife; answered all questions   - discussed with hospital primary   Palliative Encounter  Date: 3/23/2023  - interval chart reviewed in detail; discussed pt with ICU MDT Rounds   - met with pt and wife at bedside for planned GOC/ACP discussion; pt with improved calmness related to this discussion; reassured pt at anytime if there was a topic he did not wish to discuss, or wished to stop meeting to let me know   - introduction to palliative medicine and role in current/future care provided to pt's wife  - discussed previously stated goals (as discussed with Dr. Escoto); pt confirms confirmed continued desire for full code and full treatment; reassured these wishes are and will be respected; encouraged that if at anytime those wishes were to change that it could be discussed further at that time   - allowed pt and wife to share long road of cancer treatment and how this affects current GOC   - plan now in place for cardiac cath procedure today; briefly discussed  - discussed pt's symptoms of anxiety; wife also provided concerns of pt having a more depressed mood; validated anxiety and depression symptoms given pt's condition and other symptoms; discussed correlation of anxiety and respiratory symptoms; discussed options for anxiety management (see anxiety)   - pt's wife shared pt's fear of requiring a nursing home as likely source of anxiety related to previous visits; pt advocates that he would never wish to live in a  NH; wife is primary care giver and confirms they have a lot of equipment at home to help care for pt, and she is well versed on his care needs; bedside RN shared wife's concern for needed feeding supplies and requesting CM/SW assistance prior to discharge; CM/SW consult placed   - recommended home palliative or palliative clinic for continued palliative support, symptom management, and  ACP discussions; they are agreeable to this; plan for follow up to determine if home palliative or clinic preferred  - emotional support provided; allowed time for questions/concerns, all addressed   - hospital primary updated    Palliative Consult  Date: 3/22/2023 (date corrections to initial consult)   - consult received; interval chart reviewed in detail; discussed pt during ICU MDT rounds   - met with patient at ICU bedside; introduction to palliative medicine team and role in current care and admission  - initial consult on 3/21, but pt's condition did not allow for GOC discussion, post poned as to not cause increased anxiety and respiratory compromise; previous detailed GOC/ACP discussion with, then primary, Dr. Escoto   - Assessed pt and discussed plan to have GOC/ACP discussion with wife present later in the day  - as time for planned discussion neared, pt became very anxious of pending discussion; discussed role of palliative and assured that I do not wish to cause pt anxiety about discussions, and he can request to not discuss at all; explained my intentions of meeting and that I would not try to persuade him into any plans that do not align with his GOC   - validated all the hard work and things pt has overcome in his cancer treatment   - pt seemed very relieved by above discussion and agreeable for further meeting on 3/23, with wife present   - emotional support provided; allowed time for questions/concerns, all addressed   - MDT updated, and ongoing communication with MDT         I will follow-up with patient. Please contact us if you have any additional questions.    Subjective:     Chief Complaint:   Chief Complaint   Patient presents with    Hemoptysis     Ems called to 74yo male that wife noticed was having blood y sputum in his trach on Wednesday. Went thursdsay to have a trach change and the dr was unale to scope his mouth above the trach due to him gagging but did change the trach. Continued to  "have the pink sputum until 0300 today and it had bright red blood from trach. Denied any pain or SOB       HPI:   From H&P: " Fareed Richard Jr. 73 y.o. 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  Per his wife 4 days ago he began to have pink tinged sputum via his trach.  He was seen by his ENT 2 days ago with a scope exam and a trach exchange without evidence of bleeding.  This morning at 3:00 a.m. he developed bright red blood via trach which resolved without intervention.  It is since not recurred.  He takes a daily aspirin.  He receives all medications via G-tube.  His tube feeding regimen consists of 6 cans of Isosource daily with 120 cc free water boluses.  He denies fever chest pain shortness of breath nausea vomiting abdominal pain leg swelling syncope dizziness dysuria melena hematuria hematemesis.     In the ED, hemoglobin 7.6 INR 1.0 BUN 50 creatinine 0.7  troponin negative BRCA negative O positive type and screen chest x-ray without detrimental change hypotensive to 85/54."     Palliative medicine consulted for goals of care and advance care planning; Met with pt at bedside; for details of visit, see advance care planning section of plan.         Hospital Course:  No notes on file    Past Medical History:   Diagnosis Date    Cancer     skin to Rt forearm and face    COPD (chronic obstructive pulmonary disease)     Hyperlipidemia     Hypertension     Pseudoaneurysm      Past Surgical History:   Procedure Laterality Date    ABDOMINAL SURGERY      stents placed in liver and large intestines, per patient    CAROTID STENT      COLONOSCOPY N/A 09/27/2022    Procedure: COLONOSCOPY;  Surgeon: Donnie Peterson MD;  Location: [facility] ([address] [place] [address]);  Service: Endoscopy;  Laterality: N/A;    COLONOSCOPY N/A 09/30/2022    Procedure: COLONOSCOPY;  Surgeon: Joy Cabrera MD;  Location: " [facility] ENDO (2ND FLR);  Service: Endoscopy;  Laterality: N/A;    CORONARY STENT PLACEMENT  01/2000    DISSECTION OF NECK Bilateral 01/04/2022    Procedure: DISSECTION, NECK;  Surgeon: Naeem Smalls MD;  [facility];  Service: ENT;  Laterality: Bilateral;    ESOPHAGOGASTRODUODENOSCOPY N/A 09/27/2022    Procedure: EGD (ESOPHAGOGASTRODUODENOSCOPY);  Surgeon: Donnie Peterson MD;  Location: [facility] ENDO (2ND FLR);  Service: Endoscopy;  Laterality: N/A;    ESOPHAGOGASTRODUODENOSCOPY N/A 09/30/2022    Procedure: EGD (ESOPHAGOGASTRODUODENOSCOPY);  Surgeon: Joy Cabrera MD;  Location: [facility] ENDO (2ND FLR);  Service: Endoscopy;  Laterality: N/A;    ESOPHAGOGASTRODUODENOSCOPY W/ PEG N/A 01/04/2022    Procedure: EGD, WITH PEG TUBE INSERTION;  Surgeon: Anthony Calabrese MD;  [facility];  Service: General;  Laterality: N/A;    EYE SURGERY      Cataract bilateral    femoral stents      bilateral    FLAP PROCEDURE N/A 01/03/2022    Procedure: CREATION, FREE FLAP;  Surgeon: Naeem Smalls MD;  [facility];  Service: ENT;  Laterality: N/A;    FLAP PROCEDURE Right 01/04/2022    Procedure: CREATION, FREE FLAP;  Surgeon: Stacey Conde MD;  [facility];  Service: ENT;  Laterality: Right;  Ischemic start 1203  Ischemic stop 1353    GLOSSECTOMY N/A 01/04/2022    Procedure: GLOSSECTOMY;  Surgeon: Naeem Smalls MD;  [facility];  Service: ENT;  Laterality: N/A;    RADICAL NECK DISSECTION Left 01/03/2022    Procedure: DISSECTION, NECK, RADICAL;  Surgeon: Naeem Smalls MD;  [facility];  Service: ENT;  Laterality: Left;    SKIN BIOPSY      SKIN CANCER EXCISION      TRACHEOTOMY N/A 01/04/2022    Procedure: TRACHEOTOMY;  Surgeon: Naeem Smalls MD;  [facility];  Service: ENT;  Laterality: N/A;    VASCULAR SURGERY       Review of patient's allergies indicates:  No Known Allergies    Medications:  Continuous  Infusions:   dexmedeTOMIDine (Precedex) infusion (titrating) 0.2 mcg/kg/hr (23 0557)    heparin (porcine) in D5W 12 Units/kg/hr (23 1023)    nitroGLYCERIN Stopped (23 0117)    NORepinephrine bitartrate-D5W Stopped (23 0627)     Scheduled Meds:   aspirin  81 mg Oral Daily    atorvastatin  80 mg Per G Tube Daily    ceFEPime (MAXIPIME) IVPB  2 g Intravenous Q8H    clopidogreL  75 mg Oral Daily    ferrous sulfate  1 tablet Per G Tube Daily    furosemide  40 mg Per G Tube BID    glycopyrrolate  1 mg Per G Tube TID    LIDOcaine  2 patch Transdermal Q24H    melatonin  9 mg Per G Tube Nightly    mupirocin   Nasal BID    polyethylene glycol  17 g Oral BID    predniSONE  50 mg Per G Tube Daily    sodium chloride 0.9%  10 mL Intravenous Q6H    vancomycin (VANCOCIN) IVPB  1,000 mg Intravenous Q24H     PRN Meds:sodium chloride, acetaminophen, acetaminophen, albuterol-ipratropium, fentaNYL, guaiFENesin 100 mg/5 ml, HYDROcodone-acetaminophen, hydrOXYzine HCL, naloxone, nitroGLYCERIN, ondansetron, oxyCODONE, Flushing PICC Protocol **AND** sodium chloride 0.9% **AND** sodium chloride 0.9%, Pharmacy to dose Vancomycin consult **AND** vancomycin - pharmacy to dose    Family History    None       Tobacco Use    Smoking status: Former     Packs/day: 2.00     Years: 40.00     Pack years: 80.00     Types: Cigarettes     Start date: 1963     Quit date: 2018     Years since quittin.9    Smokeless tobacco: Never    Tobacco comments:     3/3 ppd x 40 yrs. Currently 3-4 cigarettes daily .He is trying  to quit. Is using a Vapor cigarettes  2-3 x's a day.   Substance and Sexual Activity    Alcohol use: Not Currently    Drug use: No    Sexual activity: Not Currently       Objective:     Vital Signs (Most Recent):  Temp: 98.1 °F (36.7 °C) (23 0700)  Pulse: 74 (23 1157)  Resp: 20 (23 1157)  BP: (!) 89/53 (23 1030)  SpO2: (!) 94 % (23 1157)   Vital Signs (24h  Range):  Temp:  [97.2 °F (36.2 °C)-98.1 °F (36.7 °C)] 98.1 °F (36.7 °C)  Pulse:  [] 74  Resp:  [12-47] 20  SpO2:  [79 %-100 %] 94 %  BP: ()/() 89/53     Weight: 68 kg (150 lb)  Body mass index is 22.81 kg/m².    Physical Exam  Constitutional:       General: He is sleeping. He is not in acute distress.     Appearance: He is ill-appearing.   HENT:      Head: Atraumatic.      Comments: History of facial/oral reconstruction   Pulmonary:      Effort: Pulmonary effort is normal.      Comments: Trach  Abdominal:      Comments: PEG   Musculoskeletal:      Right lower leg: No edema.      Left lower leg: No edema.   Neurological:      Mental Status: He is oriented to person, place, and time. He is lethargic.      Comments: Sleeping; arousable/follows commands    Psychiatric:      Comments: Frustration with difficulties with communication        Advance Care Planning   Advance Directives:   Living Will: No    LaPOST: No    Do Not Resuscitate Status: No    Medical Power of : No      Decision Making:  Patient answered questions and Family answered questions  Goals of Care: What is most important right now is to focus on spending time at home, remaining as independent as possible, symptom/pain control, curative/life-prolongation (regardless of treatment burdens). Accordingly, we have decided that the best plan to meet the patient's goals includes continuing with treatment.       Significant Labs: All pertinent labs within the past 24 hours have been reviewed.  CBC:   Recent Labs   Lab 03/24/23  0357   WBC 9.55   HGB 7.8*   HCT 24.0*   MCV 90          BMP:  Recent Labs   Lab 03/24/23  0357   MG 2.2       LFT:  Lab Results   Component Value Date    AST 22 03/19/2023    ALKPHOS 65 03/19/2023    BILITOT 0.4 03/19/2023     Albumin:   Albumin   Date Value Ref Range Status   03/23/2023 2.2 (L) 3.5 - 5.2 g/dL Final     Protein:   Total Protein   Date Value Ref Range Status   03/19/2023 5.5 (L) 6.0 - 8.4  g/dL Final     Lactic acid:   Lab Results   Component Value Date    LACTATE 1.8 03/19/2023    LACTATE 0.8 05/14/2022       Significant Imaging: I have reviewed all pertinent imaging results/findings within the past 24 hours.      Total visit time: 51 minutes    > 50% of  35  min visit spent in chart review, face to face discussion of symptom assessment, coordination of care with other specialists, and discharge planning.    16 min ACP time spent: goals of care, emotional support, formulating and communicating prognosis, exploring burden/ benefit of various approaches of treatment.       Trupti Beck NP  Palliative Medicine  [facility] - Intensive Care

## 2023-03-24 NOTE — PROGRESS NOTES
[facility] Medicine  Progress Note    Patient Name: Fareed Richard Jr.  MRN: [id number]  Patient Class: IP- Inpatient   Admission Date: 3/18/2023  Length of Stay: 5 days  Attending Physician: Luiz Patel MD  Primary Care Provider: Kodi Tubbs MD        Subjective:     Principal Problem:Hypotension        HPI:  Fareed Richard Jr. 73 y.o. 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  Per his wife 4 days ago he began to have pink tinged sputum via his trach.  He was seen by his ENT 2 days ago with a scope exam and a trach exchange without evidence of bleeding.  This morning at 3:00 a.m. he developed bright red blood via trach which resolved without intervention.  It is since not recurred.  He takes a daily aspirin.  He receives all medications via G-tube.  His tube feeding regimen consists of 6 cans of Isosource daily with 120 cc free water boluses.  He denies fever chest pain shortness of breath nausea vomiting abdominal pain leg swelling syncope dizziness dysuria melena hematuria hematemesis.    In the ED, hemoglobin 7.6 INR 1.0 BUN 50 creatinine 0.7  troponin negative BRCA negative O positive type and screen chest x-ray without detrimental change hypotensive to 85/54.      Overview/Hospital Course:  No notes on file    Interval History:  patient had rough night, agitated and attempting to remove lines. Placed on precedex. More calm this morning. No chest pain, off nitro    Objective:     Vital Signs (Most Recent):  Temp: 98.1 °F (36.7 °C) (03/24/23 0700)  Pulse: 84 (03/24/23 1030)  Resp: 20 (03/24/23 1030)  BP: (!) 89/53 (03/24/23 1030)  SpO2: (!) 94 % (03/24/23 1030)   Vital Signs (24h Range):  Temp:  [97.2 °F (36.2 °C)-98.1 °F (36.7 °C)] 98.1 °F (36.7 °C)  Pulse:  [] 84  Resp:  [12-47] 20  SpO2:  [79 %-100 %] 94 %  BP: ()/() 89/53      Weight: 68 kg (150 lb)  Body mass index is 22.81 kg/m².    Intake/Output Summary (Last 24 hours) at 3/24/2023 1135  Last data filed at 3/24/2023 0500  Gross per 24 hour   Intake 1457.77 ml   Output 1675 ml   Net -217.23 ml        Physical Exam  Vitals and nursing note reviewed.   Constitutional:       General: He is not in acute distress.     Appearance: He is well-developed. He is ill-appearing. He is not diaphoretic.   HENT:      Head: Normocephalic and atraumatic.   Eyes:      General:         Right eye: No discharge.         Left eye: No discharge.      Conjunctiva/sclera: Conjunctivae normal.   Neck:      Thyroid: No thyromegaly.      Comments: Trach in place.  No evidence of bleeding now.  Cardiovascular:      Rate and Rhythm: Normal rate and regular rhythm.      Heart sounds: No murmur heard.  Pulmonary:      Effort: Pulmonary effort is normal. No respiratory distress.      Breath sounds: Normal breath sounds.      Comments: Now on trach collar  Abdominal:      General: Bowel sounds are normal. There is no distension.      Palpations: Abdomen is soft. There is no mass.      Tenderness: There is no abdominal tenderness.      Comments: Peg tube in place   Musculoskeletal:         General: No deformity.      Cervical back: Normal range of motion and neck supple.   Skin:     General: Skin is warm and dry.   Neurological:      Mental Status: He is alert and oriented to person, place, and time.      Comments: Alert and awakens appropriately, moves all extremities At baseline.   Psychiatric:      Comments: Anxious       Significant Labs: All pertinent labs within the past 24 hours have been reviewed.    Significant Imaging: I have reviewed all pertinent imaging results/findings within the past 24 hours.    Review of Systems      Assessment/Plan:      * Hypotension  -Admitted to inpatient status  -On 3/19 developed hypoxia, hypotension, bleeding from tracheostomy / hemoptysis and severe back pain so moved to  ICU  -Prior clinic notes have shown soft blood pressure - but not this low (SBP in 70s).  -Etiology unclear - concerning for hemorrhage given bleeding at trach site and anemia, but could also be sepsis due to UTI (given urine culture growing Enterococcus) or cardiogenic (given troponin bump and severe back pain)  -Lactic acid was normal.  D-dimer minimally elevated.  Troponin bumped to 2.22 and then to 3.416.  AM cortisol only 8.6.  -CTA chest showed no PE, mucoid impaction, small airway disease, and consolidation at the lung bases bilaterally, increased from prior, increased moderate left pleural effusion, emphysema with evidence of pulmonary hypertension.  -Cardiology and pulmonology consulted and input appreciated  -On 3/19 he was transfused 2 units PRBC, bolused IV fluids, started on broad spectrum antibiotics and treated with TXA nebulizers.  Pulmonology felt ct scan likely represented pneumonia.  ENT on call for system felt OK to keep at [facility].  He did not require pressors.  His blood pressure has stabilized, bleeding appears to have stopped and Hb has improved.  -Urine culture is growing E.faecalis S >100,000cfu/ml - await sensitivities.  Blood cultures negative thus far.  Continue vancomycin and cefepime for now.  -AM cortisol is low.  Will check cosyntropin stim test.  -Bump in troponin without chest pain felt secondary to demand ischemia.  Echo noted preserved EF and inferio-basilar hypokinesis.  Not a good candidate for LHC or blood thinning medications.  Cardiology recommended outpatient stress test.  -Continue in ICU for 24 hours more  -ENT consult - Dr. French attempted scope at bedside, no bleeding or other abnormalities noted.   - Currenlty on nitro drip for chest pain which may be contributing to lower readings. Weaning off. Hypotension resolved.    UTI (urinary tract infection)  -Urine culture growing Enterococcus > 100,000 cfu/ml.  Sensitive to Vanc/Ampicillin  -Continue broad spectrum  antibiotics for now and tailor based off results.  - completed 6 days of IV antibiotics. Stop antibiotics after 3/25.    Hemoptysis  -Treatment as above. This has resolved.    PEG (percutaneous endoscopic gastrostomy) status  -Continue medications via PEG. Does not take oral intake  -On isosource 1.5 6 days daily with 120cc free water flushes via wife  -Will continue home tube feedings, with nutrition consulted    Tracheostomy present  -Present on admit with bleeding / hemoptysis  -Treating with txa nebs and bleeding appears to have resolved  -ENT - appreciate recommendations  -Continue home glycopyrrolate and guaifensin  -Continue supplemental oxygen    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   -TSH is normal and he is not on treatment as outpatient    Acute respiratory failure with hypoxia  Patient with Hypoxic Respiratory failure which is Acute on chronic.  he is on home oxygen at 2 LPM. Supplemental oxygen was provided and noted- Vent Mode: PS/CPAP  Oxygen Concentration (%):  [40-60] 40  PEEP/CPAP:  [8 cmH20] 8 cmH20  Pressure Support:  [5 cmH20] 5 cmH20  Mean Airway Pressure:  [9.7 cmH20-10.4 cmH20] 9.7 cmH20.   Signs/symptoms of respiratory failure include- tachypnea and increased work of breathing. Contributing diagnoses includes - Aspiration and Pneumonia Labs and images were reviewed. Patient Has not had a recent ABG. Will treat underlying causes and adjust management of respiratory failure as follows   - changed out trach and now placed to ventilator for positive pressure  - had secretions in trach with exchange. Appears to be more comfortable now.    Acute on chronic respiratory failure with hypoxia  -Patient with acute on chronic hypoxic respiratory failure  -At home is on 5L O2 via trach and O2 sats typically in high 80s low 90s  -Sats presently similar, but is having episodes of hemoptysis  -Acute etiology is likely due to pneumonia, mucus plugging and hemoptysis.  -Consulted  pulm-critical care and input appreciated  -Discussed with ENT on call at [facility] [place] [place] 3/19 and OK to keep at . Dr. French evaluated.  -Continue antibiotics as above.  -Continue supplemental O2 via Trach and wean as able. Placed on mechanical ventilation on 3/21 for respiratory distress and increasing oxygen requirements.  Slowly weaning at this time.  Appears more comfortable today. Weaned off vent on 3/23. No need for ventilator at night.    Acute blood loss anemia  -Hb on admit 7.6 and this morning 8.0  -Baseline Hb 8-9.  -Presented w intermittent hemoptysis via trach x 2 days. Had another episode on morning of 3/19/2023  -Seen at ENT 2 days prior to admit (Dr. Smalls) with exam without source. Trache exchanged at that time.   -Ferritin only 84 and Tsat 18% - consistent with iron deficiency  -Platelets and PT/INR are normal.  -Treated with TXA nebulizer and bleeding has stopped  -Started FeSO4 which he will need at discharge  -Repeat cbc in AM. Hb 7.7 but stable, no further evidence of bleeding. Again 7.1 but no further bleeding. Hb 7.8.    Other hyperlipidemia  -Continue home statin    Primary hypertension  -BP typically in the 90s to low 100s systolic per chart review.   -Noted hypotension 3/19 which is addressed above.  -Holding home metoprolol and amlodipine    Coronary artery disease involving native coronary artery of native heart without angina pectoris  -Denies chest pain but on 3/19 did have unexplained hypotension with severe back/shoulder pain  -Troponin on admit normal, but on 3/19 bumped to 1.7 and then 2.2 on 3/20.  -No jamil ischemic change on EKG  -Echo showed EF 60%, inferobasal hypokinesis, indeterminate diastolic function  -Cardiology consulted and input appreciated  -Cardiology suspects this is due to demand ischemia associated with his anemia and hypotension.  Further, he is a poor candidate for angiogram given his hemoptysis and inability to treat with blood thinning medications.  Recommended  "outpatient stress testing  -Holding aspirin given hemoptysis and metoprolol given hypotension  -Continue statin.  Resume BB as able. On nitro drip for chest pain. - having difficulty weaning off. He is currently cleared for DAPT as no further bleeding issues at this time. Updated Cardiology.   -Patient underwent LHC/angiogram on 2/23 - findings below.  "3/23/23 LHC - EDP 13, distal LM 30%, mild LAD 90% bifurcation lesion with D1, Cx mid 80%, RCA moderate diffuse disease with long 70% and some left to right collateral. All vessels heavily calcified     Reviewed with Dr Malone - poor candidate for CABG. Will discuss staged PCI of LAD/Cx if able to tolerate DAPT without further pulmonary bleeding"    - Currently on ASA/Plavix and low dose heparin infusion. Cardiology discussing intervention with patient and wife today. No intervention planned for today.      Squamous cell cancer of tongue  -Diagnosed 12/15/21 via FNA.  Underwent total glossectomy, bilateral neck dissection, bilateral cervical facial flaps and anterolateral thigh flap reconstruction of glossectomy defect 1/4/22  -Completed adjuvant chemoradiation  -Followed by oncology and ENT outpt. No longer on chemo or radiation.   -Had trach changed by ENT 2 days prior to admit and scope at that time showed no signs of bleeding.   -Treatment as above.      VTE Risk Mitigation (From admission, onward)         Ordered     heparin 25,000 units in dextrose 5% 250 ml (100 units/mL) infusion MINIMAL INTENSITY nomogram - [facility]  Continuous        Question Answer Comment   Heparin Infusion Adjustment (DO NOT MODIFY ANSWER) [email or web address]\[email or web address] MINIMAL  INTENSITY nomogram for [facility] [id number]    Begin at (in units/kg/hr) 12        03/24/23 0950     IP VTE HIGH RISK PATIENT  Once         03/18/23 1620     Place sequential compression device  Until discontinued         03/18/23 1620     Place NIKITA hose  Until discontinued         03/18/23 1620    "             Discharge Planning   NISA:      Code Status: Full Code   Is the patient medically ready for discharge?:     Reason for patient still in hospital (select all that apply): Treatment  Discharge Plan A: Home with family            Critical care time spent on the evaluation and treatment of severe organ dysfunction, review of pertinent labs and imaging studies, discussions with consulting providers and discussions with patient/family: 22 minutes.      Luiz Patel MD  Department of Hospital Medicine   [facility] - [place] - Intensive Care

## 2023-03-25 PROBLEM — I21.4 NSTEMI (NON-ST ELEVATED MYOCARDIAL INFARCTION): Status: ACTIVE | Noted: 2023-03-25

## 2023-03-25 PROBLEM — I25.110 CORONARY ARTERY DISEASE INVOLVING NATIVE CORONARY ARTERY OF NATIVE HEART WITH UNSTABLE ANGINA PECTORIS: Status: ACTIVE | Noted: 2021-12-29

## 2023-03-25 LAB
APTT PPP: 31.3 SEC (ref 21–32)
APTT PPP: 33.2 SEC (ref 21–32)
APTT PPP: 37.6 SEC (ref 21–32)
BASOPHILS # BLD AUTO: 0.01 K/UL (ref 0–0.2)
BASOPHILS NFR BLD: 0.1 % (ref 0–1.9)
DIFFERENTIAL METHOD: ABNORMAL
EOSINOPHIL # BLD AUTO: 0 K/UL (ref 0–0.5)
EOSINOPHIL NFR BLD: 0.2 % (ref 0–8)
ERYTHROCYTE [DISTWIDTH] IN BLOOD BY AUTOMATED COUNT: 14.8 % (ref 11.5–14.5)
HCT VFR BLD AUTO: 25.5 % (ref 40–54)
HGB BLD-MCNC: 8.2 G/DL (ref 14–18)
IMM GRANULOCYTES # BLD AUTO: 0.03 K/UL (ref 0–0.04)
IMM GRANULOCYTES NFR BLD AUTO: 0.3 % (ref 0–0.5)
LYMPHOCYTES # BLD AUTO: 0.5 K/UL (ref 1–4.8)
LYMPHOCYTES NFR BLD: 5.1 % (ref 18–48)
MAGNESIUM SERPL-MCNC: 2.4 MG/DL (ref 1.6–2.6)
MCH RBC QN AUTO: 29.8 PG (ref 27–31)
MCHC RBC AUTO-ENTMCNC: 32.2 G/DL (ref 32–36)
MCV RBC AUTO: 93 FL (ref 82–98)
MONOCYTES # BLD AUTO: 1 K/UL (ref 0.3–1)
MONOCYTES NFR BLD: 10.4 % (ref 4–15)
NEUTROPHILS # BLD AUTO: 8.2 K/UL (ref 1.8–7.7)
NEUTROPHILS NFR BLD: 83.9 % (ref 38–73)
NRBC BLD-RTO: 0 /100 WBC
PLATELET # BLD AUTO: 245 K/UL (ref 150–450)
PMV BLD AUTO: 9.4 FL (ref 9.2–12.9)
POCT GLUCOSE: 128 MG/DL (ref 70–110)
POCT GLUCOSE: 201 MG/DL (ref 70–110)
POCT GLUCOSE: 240 MG/DL (ref 70–110)
POCT GLUCOSE: 259 MG/DL (ref 70–110)
RBC # BLD AUTO: 2.75 M/UL (ref 4.6–6.2)
VANCOMYCIN SERPL-MCNC: 15.8 UG/ML
WBC # BLD AUTO: 9.74 K/UL (ref 3.9–12.7)

## 2023-03-25 PROCEDURE — 25000003 PHARM REV CODE 250: Performed by: REGISTERED NURSE

## 2023-03-25 PROCEDURE — 94761 N-INVAS EAR/PLS OXIMETRY MLT: CPT

## 2023-03-25 PROCEDURE — 63600175 PHARM REV CODE 636 W HCPCS: Performed by: INTERNAL MEDICINE

## 2023-03-25 PROCEDURE — A4216 STERILE WATER/SALINE, 10 ML: HCPCS | Performed by: HOSPITALIST

## 2023-03-25 PROCEDURE — 27000221 HC OXYGEN, UP TO 24 HOURS

## 2023-03-25 PROCEDURE — 25000003 PHARM REV CODE 250: Performed by: HOSPITALIST

## 2023-03-25 PROCEDURE — 80202 ASSAY OF VANCOMYCIN: CPT | Performed by: STUDENT IN AN ORGANIZED HEALTH CARE EDUCATION/TRAINING PROGRAM

## 2023-03-25 PROCEDURE — 63600175 PHARM REV CODE 636 W HCPCS: Performed by: STUDENT IN AN ORGANIZED HEALTH CARE EDUCATION/TRAINING PROGRAM

## 2023-03-25 PROCEDURE — 99900035 HC TECH TIME PER 15 MIN (STAT)

## 2023-03-25 PROCEDURE — 99291 PR CRITICAL CARE, E/M 30-74 MINUTES: ICD-10-PCS | Mod: ,,, | Performed by: INTERNAL MEDICINE

## 2023-03-25 PROCEDURE — 25000003 PHARM REV CODE 250: Performed by: INTERNAL MEDICINE

## 2023-03-25 PROCEDURE — 25000003 PHARM REV CODE 250: Performed by: STUDENT IN AN ORGANIZED HEALTH CARE EDUCATION/TRAINING PROGRAM

## 2023-03-25 PROCEDURE — 99291 CRITICAL CARE FIRST HOUR: CPT | Mod: ,,, | Performed by: INTERNAL MEDICINE

## 2023-03-25 PROCEDURE — 63600175 PHARM REV CODE 636 W HCPCS: Mod: TB,JG | Performed by: HOSPITALIST

## 2023-03-25 PROCEDURE — 85730 THROMBOPLASTIN TIME PARTIAL: CPT | Mod: 91 | Performed by: STUDENT IN AN ORGANIZED HEALTH CARE EDUCATION/TRAINING PROGRAM

## 2023-03-25 PROCEDURE — 85025 COMPLETE CBC W/AUTO DIFF WBC: CPT | Performed by: HOSPITALIST

## 2023-03-25 PROCEDURE — 83735 ASSAY OF MAGNESIUM: CPT | Performed by: HOSPITALIST

## 2023-03-25 PROCEDURE — 20000000 HC ICU ROOM

## 2023-03-25 PROCEDURE — 27200966 HC CLOSED SUCTION SYSTEM

## 2023-03-25 PROCEDURE — 85730 THROMBOPLASTIN TIME PARTIAL: CPT | Performed by: INTERNAL MEDICINE

## 2023-03-25 RX ORDER — METOPROLOL TARTRATE 25 MG/1
25 TABLET, FILM COATED ORAL 2 TIMES DAILY
Status: DISCONTINUED | OUTPATIENT
Start: 2023-03-25 | End: 2023-03-25

## 2023-03-25 RX ORDER — METOPROLOL TARTRATE 25 MG/1
25 TABLET, FILM COATED ORAL 2 TIMES DAILY
Status: DISCONTINUED | OUTPATIENT
Start: 2023-03-25 | End: 2023-04-26

## 2023-03-25 RX ORDER — INSULIN ASPART 100 [IU]/ML
0-5 INJECTION, SOLUTION INTRAVENOUS; SUBCUTANEOUS
Status: DISCONTINUED | OUTPATIENT
Start: 2023-03-25 | End: 2023-04-30 | Stop reason: HOSPADM

## 2023-03-25 RX ORDER — NAPROXEN SODIUM 220 MG/1
81 TABLET, FILM COATED ORAL DAILY
Status: DISCONTINUED | OUTPATIENT
Start: 2023-03-25 | End: 2023-04-30 | Stop reason: HOSPADM

## 2023-03-25 RX ORDER — SODIUM CHLORIDE 9 MG/ML
INJECTION, SOLUTION INTRAVENOUS CONTINUOUS
Status: ACTIVE | OUTPATIENT
Start: 2023-03-27 | End: 2023-03-27

## 2023-03-25 RX ORDER — DIPHENHYDRAMINE HCL 25 MG
25 CAPSULE ORAL ONCE
Status: CANCELLED | OUTPATIENT
Start: 2023-03-27

## 2023-03-25 RX ADMIN — FERROUS SULFATE TAB 325 MG (65 MG ELEMENTAL FE) 1 EACH: 325 (65 FE) TAB at 09:03

## 2023-03-25 RX ADMIN — MUPIROCIN: 20 OINTMENT TOPICAL at 09:03

## 2023-03-25 RX ADMIN — INSULIN ASPART 2 UNITS: 100 INJECTION, SOLUTION INTRAVENOUS; SUBCUTANEOUS at 06:03

## 2023-03-25 RX ADMIN — Medication 10 ML: at 12:03

## 2023-03-25 RX ADMIN — MELATONIN TAB 3 MG 9 MG: 3 TAB at 09:03

## 2023-03-25 RX ADMIN — FUROSEMIDE 40 MG: 40 TABLET ORAL at 06:03

## 2023-03-25 RX ADMIN — LIDOCAINE 5% 1 PATCH: 700 PATCH TOPICAL at 12:03

## 2023-03-25 RX ADMIN — GLYCOPYRROLATE 1 MG: 1 TABLET ORAL at 09:03

## 2023-03-25 RX ADMIN — PREDNISONE 50 MG: 50 TABLET ORAL at 09:03

## 2023-03-25 RX ADMIN — HYDROXYZINE HYDROCHLORIDE 25 MG: 25 TABLET ORAL at 09:03

## 2023-03-25 RX ADMIN — ASPIRIN 81 MG CHEWABLE TABLET 81 MG: 81 TABLET CHEWABLE at 09:03

## 2023-03-25 RX ADMIN — HEPARIN SODIUM 16 UNITS/KG/HR: 10000 INJECTION, SOLUTION INTRAVENOUS at 01:03

## 2023-03-25 RX ADMIN — HEPARIN SODIUM 18 UNITS/KG/HR: 10000 INJECTION, SOLUTION INTRAVENOUS at 03:03

## 2023-03-25 RX ADMIN — METOPROLOL TARTRATE 25 MG: 25 TABLET, FILM COATED ORAL at 09:03

## 2023-03-25 RX ADMIN — Medication 10 ML: at 06:03

## 2023-03-25 RX ADMIN — ATORVASTATIN CALCIUM 80 MG: 40 TABLET, FILM COATED ORAL at 09:03

## 2023-03-25 RX ADMIN — GLYCOPYRROLATE 1 MG: 1 TABLET ORAL at 03:03

## 2023-03-25 RX ADMIN — POLYETHYLENE GLYCOL 3350 17 G: 17 POWDER, FOR SOLUTION ORAL at 03:03

## 2023-03-25 RX ADMIN — CLOPIDOGREL BISULFATE 75 MG: 75 TABLET ORAL at 09:03

## 2023-03-25 RX ADMIN — FUROSEMIDE 40 MG: 40 TABLET ORAL at 09:03

## 2023-03-25 RX ADMIN — CEFEPIME HYDROCHLORIDE 2 G: 2 INJECTION, SOLUTION INTRAVENOUS at 05:03

## 2023-03-25 RX ADMIN — INSULIN ASPART 1 UNITS: 100 INJECTION, SOLUTION INTRAVENOUS; SUBCUTANEOUS at 09:03

## 2023-03-25 NOTE — ASSESSMENT & PLAN NOTE
"-Denies chest pain but on 3/19 did have unexplained hypotension with severe back/shoulder pain  -Troponin on admit normal, but on 3/19 bumped to 1.7 and then 2.2 on 3/20.  -No jamil ischemic change on EKG  -Echo showed EF 60%, inferobasal hypokinesis, indeterminate diastolic function  -Cardiology consulted and input appreciated  -3/19 - Cardiology suspects this is due to demand ischemia associated with his anemia and hypotension.  Further, he is a poor candidate for angiogram given his hemoptysis and inability to treat with blood thinning medications.  Recommended outpatient stress testing  -Holding aspirin given hemoptysis and metoprolol given hypotension  -Continue statin.  Resume BB as able. On nitro drip for chest pain. - having difficulty weaning off. He is currently cleared for DAPT as no further bleeding issues at this time. Updated Cardiology.   -Patient underwent LHC/angiogram on 2/23 - findings below.  "3/23/23 LHC - EDP 13, distal LM 30%, mild LAD 90% bifurcation lesion with D1, Cx mid 80%, RCA moderate diffuse disease with long 70% and some left to right collateral. All vessels heavily calcified     Reviewed with Dr Malone - poor candidate for CABG. Will discuss staged PCI of LAD/Cx if able to tolerate DAPT without further pulmonary bleeding"    - Currently on ASA/Plavix and low dose heparin infusion. Cardiology discussing intervention with patient and wife. Plans for LHC on Monday 3/27.      "

## 2023-03-25 NOTE — CONSULTS
Patient is on Isosource 1.5.  Wife stated that [employer] (present supplier) stated they are having trouble receiving.  She is agreeable to another service if they can provide in a timely manor.  CM will send referral to other services when received.

## 2023-03-25 NOTE — NURSING
Summary of shift   Patient is oriented x4 - Shiley to trach collar - obturator at the bedisde - vital signs noted - saturation in the 89-94 % on 40% - skin is ecchymotic to bilateral arms and dry - lungs are coarse - suctioned several times thick creamy blood tinged at times - strong cough- patient is on heparin - plans to cath lab on Monday for PCI- abdomen is soft with bowel sounds - peg tube intact - cleaned around the button old dried bloody drainage noted - bolus tube feeds -accuchecks q6 - spoke with Dr Patel - ss insulin for coverage -voids per urinal - left groin small dressing dry and intact - s/p cath lab puncture site from yesterday- sacral area intact and foam applied - patient turns easily in the bed states independent at home - family at the bedside - call bell within reach

## 2023-03-25 NOTE — SUBJECTIVE & OBJECTIVE
Interval History:  No new issues, patient is feeling well today.  Tolerating his tube feeds.  No chest pain shortness breast.  Feels like he is updated on plan of care    Objective:     Vital Signs (Most Recent):  Temp: 99 °F (37.2 °C) (03/25/23 1100)  Pulse: 92 (03/25/23 1400)  Resp: 20 (03/25/23 1400)  BP: 110/60 (03/25/23 1400)  SpO2: (!) 92 % (03/25/23 1400)   Vital Signs (24h Range):  Temp:  [98 °F (36.7 °C)-99 °F (37.2 °C)] 99 °F (37.2 °C)  Pulse:  [] 92  Resp:  [16-44] 20  SpO2:  [82 %-100 %] 92 %  BP: (103-170)/(58-97) 110/60     Weight: 68 kg (150 lb)  Body mass index is 22.81 kg/m².    Intake/Output Summary (Last 24 hours) at 3/25/2023 1612  Last data filed at 3/25/2023 1249  Gross per 24 hour   Intake 1985.38 ml   Output 2375 ml   Net -389.62 ml        Physical Exam  Vitals and nursing note reviewed.   Constitutional:       General: He is not in acute distress.     Appearance: He is well-developed. He is ill-appearing. He is not diaphoretic.   HENT:      Head: Normocephalic and atraumatic.   Eyes:      General:         Right eye: No discharge.         Left eye: No discharge.      Conjunctiva/sclera: Conjunctivae normal.   Neck:      Thyroid: No thyromegaly.      Comments: Trach in place.  No evidence of bleeding now.  Cardiovascular:      Rate and Rhythm: Normal rate and regular rhythm.      Heart sounds: No murmur heard.  Pulmonary:      Effort: Pulmonary effort is normal. No respiratory distress.      Breath sounds: Normal breath sounds.      Comments: Now on trach collar  Abdominal:      General: Bowel sounds are normal. There is no distension.      Palpations: Abdomen is soft. There is no mass.      Tenderness: There is no abdominal tenderness.      Comments: Peg tube in place   Musculoskeletal:         General: No deformity.      Cervical back: Normal range of motion and neck supple.   Skin:     General: Skin is warm and dry.   Neurological:      Mental Status: He is alert and oriented to  person, place, and time.      Comments: Alert and awakens appropriately, moves all extremities At baseline.   Psychiatric:      Comments: Anxious       Significant Labs: All pertinent labs within the past 24 hours have been reviewed.    Significant Imaging: I have reviewed all pertinent imaging results/findings within the past 24 hours.    Review of Systems

## 2023-03-25 NOTE — PLAN OF CARE
I spoke with the patient regarding turning- stay off pressure points - shift while in the bed - patient states he walks at home - may need physical therapy consult while hospitalized -

## 2023-03-25 NOTE — ASSESSMENT & PLAN NOTE
-Patient with acute on chronic hypoxic respiratory failure  -At home is on 5L O2 via trach and O2 sats typically in high 80s low 90s  -Sats presently similar, but is having episodes of hemoptysis  -Acute etiology is likely due to pneumonia, mucus plugging and hemoptysis.  -Consulted pulm-critical care and input appreciated  -Discussed with ENT on call at [facility] [place] 3[place] and OK to keep at [facility]. Dr. French evaluated.  -Continue antibiotics as above.  -Continue supplemental O2 via Trach and wean as able. Placed on mechanical ventilation on 3/21 for respiratory distress and increasing oxygen requirements.  Slowly weaning at this time.  Appears more comfortable today. Weaned off vent on 3/23. No need for ventilator at night.  - Currently on 8 liters trach collar

## 2023-03-25 NOTE — PROGRESS NOTES
[facility]  Progress Note    Patient Name: Fareed Richard Jr.  MRN: [id number]  Patient Class: IP- Inpatient   Admission Date: 3/18/2023  Length of Stay: 6 days  Attending Physician: Luiz Patel MD  Primary Care Provider: Kodi Tubbs MD        Subjective:     Principal Problem:Hypotension        HPI:  Fareed Richard Jr. 73 y.o. 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  Per his wife 4 days ago he began to have pink tinged sputum via his trach.  He was seen by his ENT 2 days ago with a scope exam and a trach exchange without evidence of bleeding.  This morning at 3:00 a.m. he developed bright red blood via trach which resolved without intervention.  It is since not recurred.  He takes a daily aspirin.  He receives all medications via G-tube.  His tube feeding regimen consists of 6 cans of Isosource daily with 120 cc free water boluses.  He denies fever chest pain shortness of breath nausea vomiting abdominal pain leg swelling syncope dizziness dysuria melena hematuria hematemesis.    In the ED, hemoglobin 7.6 INR 1.0 BUN 50 creatinine 0.7  troponin negative BRCA negative O positive type and screen chest x-ray without detrimental change hypotensive to 85/54.      Overview/Hospital Course:  No notes on file    Interval History:  No new issues, patient is feeling well today.  Tolerating his tube feeds.  No chest pain shortness breast.  Feels like he is updated on plan of care    Objective:     Vital Signs (Most Recent):  Temp: 99 °F (37.2 °C) (03/25/23 1100)  Pulse: 92 (03/25/23 1400)  Resp: 20 (03/25/23 1400)  BP: 110/60 (03/25/23 1400)  SpO2: (!) 92 % (03/25/23 1400)   Vital Signs (24h Range):  Temp:  [98 °F (36.7 °C)-99 °F (37.2 °C)] 99 °F (37.2 °C)  Pulse:  [] 92  Resp:  [16-44] 20  SpO2:  [82 %-100 %] 92 %  BP: (103-170)/(58-97) 110/60      Weight: 68 kg (150 lb)  Body mass index is 22.81 kg/m².    Intake/Output Summary (Last 24 hours) at 3/25/2023 1612  Last data filed at 3/25/2023 1249  Gross per 24 hour   Intake 1985.38 ml   Output 2375 ml   Net -389.62 ml        Physical Exam  Vitals and nursing note reviewed.   Constitutional:       General: He is not in acute distress.     Appearance: He is well-developed. He is ill-appearing. He is not diaphoretic.   HENT:      Head: Normocephalic and atraumatic.   Eyes:      General:         Right eye: No discharge.         Left eye: No discharge.      Conjunctiva/sclera: Conjunctivae normal.   Neck:      Thyroid: No thyromegaly.      Comments: Trach in place.  No evidence of bleeding now.  Cardiovascular:      Rate and Rhythm: Normal rate and regular rhythm.      Heart sounds: No murmur heard.  Pulmonary:      Effort: Pulmonary effort is normal. No respiratory distress.      Breath sounds: Normal breath sounds.      Comments: Now on trach collar  Abdominal:      General: Bowel sounds are normal. There is no distension.      Palpations: Abdomen is soft. There is no mass.      Tenderness: There is no abdominal tenderness.      Comments: Peg tube in place   Musculoskeletal:         General: No deformity.      Cervical back: Normal range of motion and neck supple.   Skin:     General: Skin is warm and dry.   Neurological:      Mental Status: He is alert and oriented to person, place, and time.      Comments: Alert and awakens appropriately, moves all extremities At baseline.   Psychiatric:      Comments: Anxious       Significant Labs: All pertinent labs within the past 24 hours have been reviewed.    Significant Imaging: I have reviewed all pertinent imaging results/findings within the past 24 hours.    Review of Systems      Assessment/Plan:      * Hypotension  -Admitted to inpatient status  -On 3/19 developed hypoxia, hypotension, bleeding from tracheostomy / hemoptysis and severe back pain so moved to  ICU  -Prior clinic notes have shown soft blood pressure - but not this low (SBP in 70s).  -Etiology unclear - concerning for hemorrhage given bleeding at trach site and anemia, but could also be sepsis due to UTI (given urine culture growing Enterococcus) or cardiogenic (given troponin bump and severe back pain)  -Lactic acid was normal.  D-dimer minimally elevated.  Troponin bumped to 2.22 and then to 3.416.  AM cortisol only 8.6.  -CTA chest showed no PE, mucoid impaction, small airway disease, and consolidation at the lung bases bilaterally, increased from prior, increased moderate left pleural effusion, emphysema with evidence of pulmonary hypertension.  -Cardiology and pulmonology consulted and input appreciated  -On 3/19 he was transfused 2 units PRBC, bolused IV fluids, started on broad spectrum antibiotics and treated with TXA nebulizers.  Pulmonology felt ct scan likely represented pneumonia.  ENT on call for system felt OK to keep at [facility].  He did not require pressors.  His blood pressure has stabilized, bleeding appears to have stopped and Hb has improved.  -Urine culture is growing E.faecalis S >100,000cfu/ml - await sensitivities.  Blood cultures negative thus far.  Continue vancomycin and cefepime for now.  -AM cortisol is low.  Will check cosyntropin stim test.  -Bump in troponin without chest pain felt secondary to demand ischemia.  Echo noted preserved EF and inferio-basilar hypokinesis.  Not a good candidate for LHC or blood thinning medications.  Cardiology recommended outpatient stress test.  -Continue in ICU for 24 hours more  -ENT consult - Dr. French attempted scope at bedside, no bleeding or other abnormalities noted.   - Currenlty on nitro drip for chest pain which may be contributing to lower readings. Weaning off. Hypotension resolved.    NSTEMI (non-ST elevated myocardial infarction)  -Denies chest pain but on 3/19 did have unexplained hypotension with severe back/shoulder  "pain  -Troponin on admit normal, but on 3/19 bumped to 1.7 and then 2.2 on 3/20.  -No jamil ischemic change on EKG  -Echo showed EF 60%, inferobasal hypokinesis, indeterminate diastolic function  -Cardiology consulted and input appreciated  -3/19 - Cardiology suspects this is due to demand ischemia associated with his anemia and hypotension.  Further, he is a poor candidate for angiogram given his hemoptysis and inability to treat with blood thinning medications.  Recommended outpatient stress testing  -Holding aspirin given hemoptysis and metoprolol given hypotension  -Continue statin.  Resume BB as able. On nitro drip for chest pain. - having difficulty weaning off. He is currently cleared for DAPT as no further bleeding issues at this time. Updated Cardiology.   -Patient underwent LHC/angiogram on 2/23 - findings below.  "3/23/23 LHC - EDP 13, distal LM 30%, mild LAD 90% bifurcation lesion with D1, Cx mid 80%, RCA moderate diffuse disease with long 70% and some left to right collateral. All vessels heavily calcified     Reviewed with Dr Malone - poor candidate for CABG. Will discuss staged PCI of LAD/Cx if able to tolerate DAPT without further pulmonary bleeding"    - Currently on ASA/Plavix and low dose heparin infusion. Cardiology discussing intervention with patient and wife. Plans for LHC on Monday 3/27.        UTI (urinary tract infection)  -Urine culture growing Enterococcus > 100,000 cfu/ml.  Sensitive to Vanc/Ampicillin  -Continue broad spectrum antibiotics for now and tailor based off results.  - completed 6 days of IV antibiotics. Stop antibiotics after 3/25. Completed.    Hemoptysis  -Treatment as above. This has resolved.    PEG (percutaneous endoscopic gastrostomy) status  -Continue medications via PEG. Does not take oral intake  -On isosource 1.5 6 days daily with 120cc free water flushes via wife  -Will continue home tube feedings, with nutrition consulted    Tracheostomy present  -Present on admit " with bleeding / hemoptysis  -Treating with txa nebs and bleeding appears to have resolved  -ENT - appreciate recommendations  -Continue home glycopyrrolate and guaifensin  -Continue supplemental oxygen    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   -TSH is normal and he is not on treatment as outpatient    Acute respiratory failure with hypoxia  Patient with Hypoxic Respiratory failure which is Acute on chronic.  he is on home oxygen at 2 LPM. Supplemental oxygen was provided and noted- Oxygen Concentration (%):  [35-40] 35.   Signs/symptoms of respiratory failure include- tachypnea and increased work of breathing. Contributing diagnoses includes - Aspiration and Pneumonia Labs and images were reviewed. Patient Has not had a recent ABG. Will treat underlying causes and adjust management of respiratory failure as follows   - changed out trach and now placed to ventilator for positive pressure  - had secretions in trach with exchange. Appears to be more comfortable now.    Acute on chronic respiratory failure with hypoxia  -Patient with acute on chronic hypoxic respiratory failure  -At home is on 5L O2 via trach and O2 sats typically in high 80s low 90s  -Sats presently similar, but is having episodes of hemoptysis  -Acute etiology is likely due to pneumonia, mucus plugging and hemoptysis.  -Consulted pulm-critical care and input appreciated  -Discussed with ENT on call at [facility] 3/19 and OK to keep at [facility]. Dr. French evaluated.  -Continue antibiotics as above.  -Continue supplemental O2 via Trach and wean as able. Placed on mechanical ventilation on 3/21 for respiratory distress and increasing oxygen requirements.  Slowly weaning at this time.  Appears more comfortable today. Weaned off vent on 3/23. No need for ventilator at night.  - Currently on 8 liters trach collar    Acute blood loss anemia  -Hb on admit 7.6 and this morning 8.0  -Baseline Hb 8-9.  -Presented w intermittent hemoptysis via  "trach x 2 days. Had another episode on morning of 3/19/2023  -Seen at ENT 2 days prior to admit (Dr. Smalls) with exam without source. Trache exchanged at that time.   -Ferritin only 84 and Tsat 18% - consistent with iron deficiency  -Platelets and PT/INR are normal.  -Treated with TXA nebulizer and bleeding has stopped  -Started FeSO4 which he will need at discharge  -Repeat cbc in AM. Hb 7.7 but stable, no further evidence of bleeding. Again 7.1 but no further bleeding.   Stable at 8.2.     Other hyperlipidemia  -Continue home statin    Primary hypertension  -BP typically in the 90s to low 100s systolic per chart review.   -Noted hypotension 3/19 which is addressed above.  -Holding home metoprolol and amlodipine    Coronary artery disease involving native coronary artery of native heart with unstable angina pectoris  -Denies chest pain but on 3/19 did have unexplained hypotension with severe back/shoulder pain  -Troponin on admit normal, but on 3/19 bumped to 1.7 and then 2.2 on 3/20.  -No jamil ischemic change on EKG  -Echo showed EF 60%, inferobasal hypokinesis, indeterminate diastolic function  -Cardiology consulted and input appreciated  -Cardiology suspects this is due to demand ischemia associated with his anemia and hypotension.  Further, he is a poor candidate for angiogram given his hemoptysis and inability to treat with blood thinning medications.  Recommended outpatient stress testing  -Holding aspirin given hemoptysis and metoprolol given hypotension  -Continue statin.  Resume BB as able. On nitro drip for chest pain. - having difficulty weaning off. He is currently cleared for DAPT as no further bleeding issues at this time. Updated Cardiology.   -Patient underwent LHC/angiogram on 2/23 - findings below.  "3/23/23 C - EDP 13, distal LM 30%, mild LAD 90% bifurcation lesion with D1, Cx mid 80%, RCA moderate diffuse disease with long 70% and some left to right collateral. All vessels heavily " "calcified     Reviewed with Dr Malone - poor candidate for CABG. Will discuss staged PCI of LAD/Cx if able to tolerate DAPT without further pulmonary bleeding"    - Currently on ASA/Plavix and low dose heparin infusion. Cardiology discussing intervention with patient and wife today. No intervention planned for today.      Squamous cell cancer of tongue  -Diagnosed 12/15/21 via FNA.  Underwent total glossectomy, bilateral neck dissection, bilateral cervical facial flaps and anterolateral thigh flap reconstruction of glossectomy defect 1/4/22  -Completed adjuvant chemoradiation  -Followed by oncology and ENT outpt. No longer on chemo or radiation.   -Had trach changed by ENT 2 days prior to admit and scope at that time showed no signs of bleeding.   -Treatment as above.      VTE Risk Mitigation (From admission, onward)         Ordered     heparin 25,000 units in dextrose 5% 250 ml (100 units/mL) infusion MINIMAL INTENSITY nomogram - [facility]  Continuous        Question Answer Comment   Heparin Infusion Adjustment (DO NOT MODIFY ANSWER) [email or web address]\[email or web address]\[email or web address] MINIMAL  INTENSITY nomogram for [facility] [id number]    Begin at (in units/kg/hr) 12        03/24/23 0950     IP VTE HIGH RISK PATIENT  Once         03/18/23 1620     Place sequential compression device  Until discontinued         03/18/23 1620     Place NIKITA hose  Until discontinued         03/18/23 1620                Discharge Planning   NISA:      Code Status: Full Code   Is the patient medically ready for discharge?:     Reason for patient still in hospital (select all that apply): Treatment  Discharge Plan A: Home with family            Critical care time spent on the evaluation and treatment of severe organ dysfunction, review of pertinent labs and imaging studies, discussions with consulting providers and discussions with patient/family: 15 minutes.      Luiz Patel MD  Department of Hospital Medicine   [facility] - Intensive " Care

## 2023-03-25 NOTE — PROGRESS NOTES
[facility] [place] Intensive Care  Cardiology  Progress Note    Patient Name: Fareed Richard Jr.  MRN: [id number]  Admission Date: 3/18/2023  Hospital Length of Stay: 6 days  Code Status: Full Code   Attending Physician: Luiz Patel MD   Primary Care Physician: Kodi Tubbs MD  Expected Discharge Date:   Principal Problem:Hypotension    Subjective:     Hospital Course:   3/21/23 Developed increased SOB last PM. Troponin increased to 3. EKG with LBBB associated with sinus tachycardia that resolved when HR improved. Started on heparin which he has tolerated so far without further hemoptysis. Low dose tridil     3/22/23 Awake on tach collar. Denies CP. Less SOB. HCT continues to drop on heparin. No further hemoptysis. ST depression noted on telemetry. Good UOP to lasix 40 mg IV last PM. BNP 2543    3/23/23 [facility] - EDP 13, distal LM 30%, mild LAD 90% bifurcation lesion with D1, Cx mid 80%, RCA moderate diffuse disease with long 70% and some left to right collateral. All vessels heavily calcified  Reviewed with Dr Malone - poor candidate for CABG. Will discuss staged PCI of LAD/Cx if able to tolerate DAPT without further pulmonary bleeding.    Interval Hx: pt seen in ICU, case d/w Ashley Patel.  No cp/sob.  Case d/w wife yesterday.  Plan for PCI LAD +/- LCx on 3/27.      Tele: SR 90s, PVCs (personally reviewed and interpreted)          Review of Systems   Reason unable to perform ROS: trach.   Objective:     Vital Signs (Most Recent):  Temp: 98.1 °F (36.7 °C) (03/25/23 0700)  Pulse: 96 (03/25/23 0849)  Resp: (!) 23 (03/25/23 0849)  BP: 119/75 (03/25/23 0922)  SpO2: (!) 94 % (03/25/23 0849)   Vital Signs (24h Range):  Temp:  [98 °F (36.7 °C)-98.5 °F (36.9 °C)] 98.1 °F (36.7 °C)  Pulse:  [] 96  Resp:  [16-44] 23  SpO2:  [82 %-100 %] 94 %  BP: ()/(53-94) 119/75     Weight: 68 kg (150 lb)  Body mass index is 22.81 kg/m².     SpO2: (!) 94 %         Intake/Output Summary (Last 24 hours) at 3/25/2023 1207  Last data filed  at 3/25/2023 1113  Gross per 24 hour   Intake 2097.52 ml   Output 3000 ml   Net -902.48 ml         Lines/Drains/Airways       Peripherally Inserted Central Catheter Line  Duration             PICC Triple Lumen 03/20/23 2145 right basilic 4 days              Drain  Duration                  Gastrostomy/Enterostomy 01/04/22 Percutaneous endoscopic gastrostomy (PEG)  days              Airway  Duration             Adult Surgical Airway 03/16/23 1014 Shiley Uncuffed 6.0 9 days              Peripheral Intravenous Line  Duration                  Peripheral IV - Single Lumen 03/20/23 1825 22 G Anterior;Right Forearm 4 days                  Examunchanged vs 3/24/23  Physical Exam  Constitutional:       General: He is not in acute distress.     Appearance: He is well-developed. He is ill-appearing. He is not toxic-appearing or diaphoretic.   HENT:      Head: Normocephalic and atraumatic.   Eyes:      General: No scleral icterus.     Extraocular Movements: Extraocular movements intact.      Conjunctiva/sclera: Conjunctivae normal.      Pupils: Pupils are equal, round, and reactive to light.   Neck:      Thyroid: No thyromegaly.      Vascular: No JVD.      Trachea: No tracheal deviation.      Comments: Trach in place  Cardiovascular:      Rate and Rhythm: Normal rate and regular rhythm.      Pulses:           Radial pulses are 2+ on the right side.      Heart sounds: S1 normal and S2 normal. Heart sounds are distant. No murmur heard.    No friction rub. No gallop.   Pulmonary:      Effort: Pulmonary effort is normal. No respiratory distress.      Breath sounds: Normal breath sounds. No stridor. No wheezing, rhonchi or rales.   Chest:      Chest wall: No tenderness.   Abdominal:      General: There is no distension.      Palpations: Abdomen is soft.   Musculoskeletal:         General: No swelling or tenderness. Normal range of motion.      Cervical back: Normal range of motion and neck supple. No rigidity.      Right  lower leg: No edema.      Left lower leg: No edema.   Skin:     General: Skin is warm and dry.      Coloration: Skin is not jaundiced.   Neurological:      General: No focal deficit present.      Mental Status: He is oriented to person, place, and time.      Cranial Nerves: No cranial nerve deficit.   Psychiatric:         Mood and Affect: Mood normal.         Behavior: Behavior normal.       Current Medications:   aspirin  81 mg Oral Daily    atorvastatin  80 mg Per G Tube Daily    clopidogreL  75 mg Oral Daily    ferrous sulfate  1 tablet Per G Tube Daily    furosemide  40 mg Per G Tube BID    glycopyrrolate  1 mg Per G Tube TID    LIDOcaine  2 patch Transdermal Q24H    melatonin  9 mg Per G Tube Nightly    mupirocin   Nasal BID    polyethylene glycol  17 g Oral BID    predniSONE  50 mg Per G Tube Daily    sodium chloride 0.9%  10 mL Intravenous Q6H      dexmedeTOMIDine (Precedex) infusion (titrating) Stopped (03/24/23 1100)    heparin (porcine) in D5W 16 Units/kg/hr (03/25/23 0848)    NORepinephrine bitartrate-D5W Stopped (03/22/23 0627)     sodium chloride, acetaminophen, acetaminophen, albuterol-ipratropium, fentaNYL, guaiFENesin 100 mg/5 ml, HYDROcodone-acetaminophen, hydrOXYzine HCL, naloxone, nitroGLYCERIN, ondansetron, oxyCODONE, Flushing PICC Protocol **AND** sodium chloride 0.9% **AND** sodium chloride 0.9%    Laboratory (all labs reviewed):  CBC:  Recent Labs   Lab 03/22/23  0233 03/22/23  0450 03/23/23  0332 03/24/23  0357 03/25/23  0429   WBC 5.13  5.13 6.14 10.27  10.27 9.55 9.74   Hemoglobin 6.9 L  6.9 L 7.5 L 7.1 L  7.1 L 7.8 L 8.2 L   Hematocrit 20.9 L  20.9 L 23.1 L 21.2 L  21.2 L 24.0 L 25.5 L   Platelets 163  163 199 185  185 217 245         CHEMISTRIES:  Recent Labs   Lab 05/10/22  1031 05/11/22  0412 05/12/22  0343 05/14/22  0724 05/17/22  1007 08/24/22  1000 03/19/23  0413 03/20/23  0205 03/21/23  0242 03/22/23  0233 03/23/23  0332 03/24/23  0357 03/25/23  0429   Glucose  130 H 103 110 96 162 H   < > 132 H 146 H 177 H 181 H 178 H  --   --    Sodium 140 139 135 L 136 133 L   < > 144 143 139 137 135 L  --   --    Potassium 4.6 5.0 4.4 4.1 5.0   < > 4.8 4.2 3.7 4.2 3.8  --   --    BUN 22 26 H 24 H 18 15   < > 44 H 25 H 19 24 H 33 H  --   --    Creatinine 0.8 0.7 0.7 0.7 0.7   < > 0.7 0.6 0.7 0.8 0.7  --   --    eGFR if non African American >60.0 >60.0 >60.0 >60 >60.0  --   --   --   --   --   --   --   --    eGFR  --   --   --   --   --    < > >60 >60 >60 >60 >60  --   --    Calcium 9.7 9.7 9.1 9.6 9.5   < > 8.2 L 8.4 L 8.3 L 8.1 L 8.0 L  --   --    Magnesium  --  2.3 2.0  --  2.0   < >  --  2.0 1.8 1.9 2.1 2.2 2.4    < > = values in this interval not displayed.         CARDIAC BIOMARKERS:  Recent Labs   Lab 09/14/22  0459 03/18/23  1106 03/19/23  1308 03/19/23  1840 03/20/23  1642   Troponin I 0.010 0.010 1.736 H 2.222 H 3.416 H         COAGS:  Recent Labs   Lab 01/04/22  0405 01/12/22  0510 09/14/22  0641 03/18/23  1106 03/20/23  1744   INR 0.9 0.9 1.0 1.0 1.0         LIPIDS/LFTS:  Recent Labs   Lab 09/23/22  1247 09/30/22  0317 11/23/22  0733 03/18/23  1106 03/19/23  0413   AST 13 16 17 16 22   ALT 13 16 14 16 15         BNP:  Recent Labs   Lab 05/14/22  0725 09/14/22  0459 09/23/22  1247 03/18/23  1106 03/21/23  1525   BNP 98 185 H 468 H 136 H 2,543 H         TSH:  Recent Labs   Lab 02/16/22  1157 08/24/22  1000 11/23/22  0733 01/24/23  1105 03/19/23  1308   TSH 1.332 2.279 4.924 H 4.861 H 2.787         Free T4:  Recent Labs   Lab 11/23/22  0733 01/24/23  1105   Free T4 0.86 0.90         Diagnostic Results:  Echo: 3/20/23   The left ventricle is normal in size with concentric hypertrophy and normal systolic function.   The estimated ejection fraction is 60%. Inferobasal hypokinesis   Indeterminate left ventricular diastolic function.   Normal right ventricular size with normal right ventricular systolic function.   Mild left atrial enlargement.   Mild right atrial  enlargement.   Normal central venous pressure (3 mmHg).   The estimated PA systolic pressure is 13 mmHg.   The sinuses of Valsalva is mildly dilated. 4.2cm.    Cath: 3/23/23 (images personally reviewed and interpreted)  3/23/23 [facility] - EDP 13, distal LM 30%, mild LAD 90% bifurcation lesion with D1, Cx mid 80%, RCA moderate diffuse disease with long 70% and some left to right collateral. All vessels heavily calcified  Reviewed with Dr Malone - poor candidate for CABG. Will discuss staged PCI of LAD/Cx if able to tolerate DAPT without further pulmonary bleeding      Assessment and Plan:     * Hypotension  Possibly from bleeding.  Resolved.    Squamous cell cancer of tongue  Per IM    NSTEMI (non-ST elevated myocardial infarction)  As per CAD section    Coronary artery disease involving native coronary artery of native heart with unstable angina pectoris  Remote Hx PCI to RCA 2000. Troponin up to 2.2. Denies CP. EKG with mild NSSTT changes. Likely demand ischemia from hypotension and anemia. Poor candidate for anticoagulation or [facility] with high bleeding risk and hemoptysis. Check echo - if stable ok for out patient ischemic evaluation when able to tolerate anticoagulation  3/21/23 Troponin increased to 3. Had rate related LBBB that has since resolved. Denies CP. EF 60% on echo with inferobasal HK. Tolerating heparin so far.   3/23/23: cath with prox LAD/D1 (?culprit), LCx and RCA stenoses.  Poor surgical candidate.    3/24/23: no further CP.  Tolerating DAPT.  Cont statin.  Will resume heparin IV.  Will discuss possible high risk PCI with wife when she arrives.    3/25/23: discussed with wife yesterday.  Cath permit signed.    Tolerating DAPT/IV heparin  Cont statin  Add metoprolol 25mg bid    Risks, benefits and alternatives of the catheterization procedure were discussed with the patient which include but are not limited to: bleeding, infection, death, heart attack, arrhythmia, kidney failure, stroke, need for emergency  surgery, etc.  Patient understands and and agrees to proceed.  Consent was placed on the chart.        Hemoptysis  Per pulmonary. No further bleeding noted.  Tolerating DAPT/IV heparin.    Primary hypertension  Cont med rx as tolerated.    Other hyperlipidemia  Cont atorva 80mg  Check lipids/LFT 6 months (Sept 2023)    Acute blood loss anemia  As above  HGB stable    Acute on chronic respiratory failure with hypoxia  Mgmt per IM/pulm    Tracheostomy present  Per ENT/pulm    Elevated brain natriuretic peptide (BNP) level  Does not appear grossly vol overloaded at present.        VTE Risk Mitigation (From admission, onward)         Ordered     heparin 25,000 units in dextrose 5% 250 ml (100 units/mL) infusion MINIMAL INTENSITY nomogram - [facility]  Continuous        Question Answer Comment   Heparin Infusion Adjustment (DO NOT MODIFY ANSWER) [email or web address] MINIMAL  INTENSITY nomogram for [facility] [email or web address]    Begin at (in units/kg/hr) 12        03/24/23 0950     IP VTE HIGH RISK PATIENT  Once         03/18/23 1620     Place sequential compression device  Until discontinued         03/18/23 1620     Place NIKITA hose  Until discontinued         03/18/23 1620              Pt seen in ICU, case d/w Dr. Patel and RN.    Critical care time 40min.    Tim Bhatt MD  Cardiology  [facility] - Intensive Care

## 2023-03-25 NOTE — ASSESSMENT & PLAN NOTE
-Urine culture growing Enterococcus > 100,000 cfu/ml.  Sensitive to Vanc/Ampicillin  -Continue broad spectrum antibiotics for now and tailor based off results.  - completed 6 days of IV antibiotics. Stop antibiotics after 3/25. Completed.

## 2023-03-25 NOTE — ASSESSMENT & PLAN NOTE
-Hb on admit 7.6 and this morning 8.0  -Baseline Hb 8-9.  -Presented w intermittent hemoptysis via trach x 2 days. Had another episode on morning of 3/19/2023  -Seen at ENT 2 days prior to admit (Dr. Smalls) with exam without source. Trache exchanged at that time.   -Ferritin only 84 and Tsat 18% - consistent with iron deficiency  -Platelets and PT/INR are normal.  -Treated with TXA nebulizer and bleeding has stopped  -Started FeSO4 which he will need at discharge  -Repeat cbc in AM. Hb 7.7 but stable, no further evidence of bleeding. Again 7.1 but no further bleeding.   Stable at 8.2.

## 2023-03-25 NOTE — ASSESSMENT & PLAN NOTE
Remote Hx PCI to RCA 2000. Troponin up to 2.2. Denies CP. EKG with mild NSSTT changes. Likely demand ischemia from hypotension and anemia. Poor candidate for anticoagulation or LHC with high bleeding risk and hemoptysis. Check echo - if stable ok for out patient ischemic evaluation when able to tolerate anticoagulation  3/21/23 Troponin increased to 3. Had rate related LBBB that has since resolved. Denies CP. EF 60% on echo with inferobasal HK. Tolerating heparin so far.   3/23/23: cath with prox LAD/D1 (?culprit), LCx and RCA stenoses.  Poor surgical candidate.    3/24/23: no further CP.  Tolerating DAPT.  Cont statin.  Will resume heparin IV.  Will discuss possible high risk PCI with wife when she arrives.    3/25/23: discussed with wife yesterday.  Cath permit signed.    Tolerating DAPT/IV heparin  Cont statin  Add metoprolol 25mg bid    Risks, benefits and alternatives of the catheterization procedure were discussed with the patient which include but are not limited to: bleeding, infection, death, heart attack, arrhythmia, kidney failure, stroke, need for emergency surgery, etc.  Patient understands and and agrees to proceed.  Consent was placed on the chart.

## 2023-03-25 NOTE — NURSING
[facility]  Nurses Note -- 4 Eyes      3/25/2023       Skin assessed on: Q Shift      [x] No Pressure Injuries Present    [x]Prevention Measures Documented    [] Yes LDA  for Pressure Injury Previously documented     [] Yes New Pressure Injury Discovered   [] LDA for New Pressure Injury Added      Attending RN:  Vilma Rubin RN     Second RN:  Brenda TRIANA

## 2023-03-25 NOTE — ASSESSMENT & PLAN NOTE
Patient with Hypoxic Respiratory failure which is Acute on chronic.  he is on home oxygen at 2 LPM. Supplemental oxygen was provided and noted- Oxygen Concentration (%):  [35-40] 35.   Signs/symptoms of respiratory failure include- tachypnea and increased work of breathing. Contributing diagnoses includes - Aspiration and Pneumonia Labs and images were reviewed. Patient Has not had a recent ABG. Will treat underlying causes and adjust management of respiratory failure as follows   - changed out trach and now placed to ventilator for positive pressure  - had secretions in trach with exchange. Appears to be more comfortable now.

## 2023-03-25 NOTE — SUBJECTIVE & OBJECTIVE
Review of Systems   Reason unable to perform ROS: trach.   Objective:     Vital Signs (Most Recent):  Temp: 98.1 °F (36.7 °C) (03/25/23 0700)  Pulse: 96 (03/25/23 0849)  Resp: (!) 23 (03/25/23 0849)  BP: 119/75 (03/25/23 0922)  SpO2: (!) 94 % (03/25/23 0849)   Vital Signs (24h Range):  Temp:  [98 °F (36.7 °C)-98.5 °F (36.9 °C)] 98.1 °F (36.7 °C)  Pulse:  [] 96  Resp:  [16-44] 23  SpO2:  [82 %-100 %] 94 %  BP: ()/(53-94) 119/75     Weight: 68 kg (150 lb)  Body mass index is 22.81 kg/m².     SpO2: (!) 94 %         Intake/Output Summary (Last 24 hours) at 3/25/2023 1208  Last data filed at 3/25/2023 1113  Gross per 24 hour   Intake 2097.52 ml   Output 3000 ml   Net -902.48 ml         Lines/Drains/Airways       Peripherally Inserted Central Catheter Line  Duration             PICC Triple Lumen 03/20/23 2145 right basilic 4 days              Drain  Duration                  Gastrostomy/Enterostomy 01/04/22 Percutaneous endoscopic gastrostomy (PEG)  days              Airway  Duration             Adult Surgical Airway 03/16/23 1014 Shiley Uncuffed 6.0 9 days              Peripheral Intravenous Line  Duration                  Peripheral IV - Single Lumen 03/20/23 1825 22 G Anterior;Right Forearm 4 days                  Examunchanged vs 3/24/23  Physical Exam  Constitutional:       General: He is not in acute distress.     Appearance: He is well-developed. He is ill-appearing. He is not toxic-appearing or diaphoretic.   HENT:      Head: Normocephalic and atraumatic.   Eyes:      General: No scleral icterus.     Extraocular Movements: Extraocular movements intact.      Conjunctiva/sclera: Conjunctivae normal.      Pupils: Pupils are equal, round, and reactive to light.   Neck:      Thyroid: No thyromegaly.      Vascular: No JVD.      Trachea: No tracheal deviation.      Comments: Trach in place  Cardiovascular:      Rate and Rhythm: Normal rate and regular rhythm.      Pulses:           Radial pulses are  2+ on the right side.      Heart sounds: S1 normal and S2 normal. Heart sounds are distant. No murmur heard.    No friction rub. No gallop.   Pulmonary:      Effort: Pulmonary effort is normal. No respiratory distress.      Breath sounds: Normal breath sounds. No stridor. No wheezing, rhonchi or rales.   Chest:      Chest wall: No tenderness.   Abdominal:      General: There is no distension.      Palpations: Abdomen is soft.   Musculoskeletal:         General: No swelling or tenderness. Normal range of motion.      Cervical back: Normal range of motion and neck supple. No rigidity.      Right lower leg: No edema.      Left lower leg: No edema.   Skin:     General: Skin is warm and dry.      Coloration: Skin is not jaundiced.   Neurological:      General: No focal deficit present.      Mental Status: He is oriented to person, place, and time.      Cranial Nerves: No cranial nerve deficit.   Psychiatric:         Mood and Affect: Mood normal.         Behavior: Behavior normal.       Current Medications:   aspirin  81 mg Oral Daily    atorvastatin  80 mg Per G Tube Daily    clopidogreL  75 mg Oral Daily    ferrous sulfate  1 tablet Per G Tube Daily    furosemide  40 mg Per G Tube BID    glycopyrrolate  1 mg Per G Tube TID    LIDOcaine  2 patch Transdermal Q24H    melatonin  9 mg Per G Tube Nightly    mupirocin   Nasal BID    polyethylene glycol  17 g Oral BID    predniSONE  50 mg Per G Tube Daily    sodium chloride 0.9%  10 mL Intravenous Q6H      dexmedeTOMIDine (Precedex) infusion (titrating) Stopped (03/24/23 1100)    heparin (porcine) in D5W 16 Units/kg/hr (03/25/23 0848)    NORepinephrine bitartrate-D5W Stopped (03/22/23 0627)     sodium chloride, acetaminophen, acetaminophen, albuterol-ipratropium, fentaNYL, guaiFENesin 100 mg/5 ml, HYDROcodone-acetaminophen, hydrOXYzine HCL, naloxone, nitroGLYCERIN, ondansetron, oxyCODONE, Flushing PICC Protocol **AND** sodium chloride 0.9% **AND** sodium chloride  0.9%    Laboratory (all labs reviewed):  CBC:  Recent Labs   Lab 03/22/23  0233 03/22/23  0450 03/23/23  0332 03/24/23  0357 03/25/23  0429   WBC 5.13  5.13 6.14 10.27  10.27 9.55 9.74   Hemoglobin 6.9 L  6.9 L 7.5 L 7.1 L  7.1 L 7.8 L 8.2 L   Hematocrit 20.9 L  20.9 L 23.1 L 21.2 L  21.2 L 24.0 L 25.5 L   Platelets 163  163 199 185  185 217 245         CHEMISTRIES:  Recent Labs   Lab 05/10/22  1031 05/11/22  0412 05/12/22  0343 05/14/22  0724 05/17/22  1007 08/24/22  1000 03/19/23  0413 03/20/23  0205 03/21/23  0242 03/22/23  0233 03/23/23  0332 03/24/23  0357 03/25/23  0429   Glucose 130 H 103 110 96 162 H   < > 132 H 146 H 177 H 181 H 178 H  --   --    Sodium 140 139 135 L 136 133 L   < > 144 143 139 137 135 L  --   --    Potassium 4.6 5.0 4.4 4.1 5.0   < > 4.8 4.2 3.7 4.2 3.8  --   --    BUN 22 26 H 24 H 18 15   < > 44 H 25 H 19 24 H 33 H  --   --    Creatinine 0.8 0.7 0.7 0.7 0.7   < > 0.7 0.6 0.7 0.8 0.7  --   --    eGFR if non African American >60.0 >60.0 >60.0 >60 >60.0  --   --   --   --   --   --   --   --    eGFR  --   --   --   --   --    < > >60 >60 >60 >60 >60  --   --    Calcium 9.7 9.7 9.1 9.6 9.5   < > 8.2 L 8.4 L 8.3 L 8.1 L 8.0 L  --   --    Magnesium  --  2.3 2.0  --  2.0   < >  --  2.0 1.8 1.9 2.1 2.2 2.4    < > = values in this interval not displayed.         CARDIAC BIOMARKERS:  Recent Labs   Lab 09/14/22  0459 03/18/23  1106 03/19/23  1308 03/19/23  1840 03/20/23  1642   Troponin I 0.010 0.010 1.736 H 2.222 H 3.416 H         COAGS:  Recent Labs   Lab 01/04/22  0405 01/12/22  0510 09/14/22  0641 03/18/23  1106 03/20/23  1744   INR 0.9 0.9 1.0 1.0 1.0         LIPIDS/LFTS:  Recent Labs   Lab 09/23/22  1247 09/30/22  0317 11/23/22  0733 03/18/23  1106 03/19/23  0413   AST 13 16 17 16 22   ALT 13 16 14 16 15         BNP:  Recent Labs   Lab 05/14/22  0725 09/14/22  0459 09/23/22  1247 03/18/23  1106 03/21/23  1525   BNP 98 185 H 468 H 136 H 2,543 H         TSH:  Recent Labs   Lab  02/16/22  1157 08/24/22  1000 11/23/22  0733 01/24/23  1105 03/19/23  1308   TSH 1.332 2.279 4.924 H 4.861 H 2.787         Free T4:  Recent Labs   Lab 11/23/22  0733 01/24/23  1105   Free T4 0.86 0.90         Diagnostic Results:  Echo: 3/20/23  The left ventricle is normal in size with concentric hypertrophy and normal systolic function.  The estimated ejection fraction is 60%. Inferobasal hypokinesis  Indeterminate left ventricular diastolic function.  Normal right ventricular size with normal right ventricular systolic function.  Mild left atrial enlargement.  Mild right atrial enlargement.  Normal central venous pressure (3 mmHg).  The estimated PA systolic pressure is 13 mmHg.  The sinuses of Valsalva is mildly dilated. 4.2cm.    Cath: 3/23/23 (images personally reviewed and interpreted)  3/23/23 [facility] - EDP 13, distal LM 30%, mild LAD 90% bifurcation lesion with D1, Cx mid 80%, RCA moderate diffuse disease with long 70% and some left to right collateral. All vessels heavily calcified  Reviewed with Dr Malone - poor candidate for CABG. Will discuss staged PCI of LAD/Cx if able to tolerate DAPT without further pulmonary bleeding

## 2023-03-26 LAB
APTT PPP: 42.7 SEC (ref 21–32)
APTT PPP: 44.1 SEC (ref 21–32)
BASOPHILS # BLD AUTO: 0 K/UL (ref 0–0.2)
BASOPHILS NFR BLD: 0 % (ref 0–1.9)
DIFFERENTIAL METHOD: ABNORMAL
EOSINOPHIL # BLD AUTO: 0.1 K/UL (ref 0–0.5)
EOSINOPHIL NFR BLD: 0.7 % (ref 0–8)
ERYTHROCYTE [DISTWIDTH] IN BLOOD BY AUTOMATED COUNT: 14.6 % (ref 11.5–14.5)
HCT VFR BLD AUTO: 26 % (ref 40–54)
HGB BLD-MCNC: 8.3 G/DL (ref 14–18)
IMM GRANULOCYTES # BLD AUTO: 0.04 K/UL (ref 0–0.04)
IMM GRANULOCYTES NFR BLD AUTO: 0.5 % (ref 0–0.5)
LYMPHOCYTES # BLD AUTO: 0.6 K/UL (ref 1–4.8)
LYMPHOCYTES NFR BLD: 6.6 % (ref 18–48)
MAGNESIUM SERPL-MCNC: 2.3 MG/DL (ref 1.6–2.6)
MCH RBC QN AUTO: 29.3 PG (ref 27–31)
MCHC RBC AUTO-ENTMCNC: 31.9 G/DL (ref 32–36)
MCV RBC AUTO: 92 FL (ref 82–98)
MONOCYTES # BLD AUTO: 0.8 K/UL (ref 0.3–1)
MONOCYTES NFR BLD: 9.5 % (ref 4–15)
NEUTROPHILS # BLD AUTO: 7.3 K/UL (ref 1.8–7.7)
NEUTROPHILS NFR BLD: 82.7 % (ref 38–73)
NRBC BLD-RTO: 0 /100 WBC
PLATELET # BLD AUTO: 233 K/UL (ref 150–450)
PMV BLD AUTO: 9 FL (ref 9.2–12.9)
POCT GLUCOSE: 126 MG/DL (ref 70–110)
POCT GLUCOSE: 228 MG/DL (ref 70–110)
POCT GLUCOSE: 267 MG/DL (ref 70–110)
RBC # BLD AUTO: 2.83 M/UL (ref 4.6–6.2)
WBC # BLD AUTO: 8.76 K/UL (ref 3.9–12.7)

## 2023-03-26 PROCEDURE — 85730 THROMBOPLASTIN TIME PARTIAL: CPT | Performed by: INTERNAL MEDICINE

## 2023-03-26 PROCEDURE — 25000003 PHARM REV CODE 250: Performed by: HOSPITALIST

## 2023-03-26 PROCEDURE — 94640 AIRWAY INHALATION TREATMENT: CPT

## 2023-03-26 PROCEDURE — 25000003 PHARM REV CODE 250: Performed by: INTERNAL MEDICINE

## 2023-03-26 PROCEDURE — 99900035 HC TECH TIME PER 15 MIN (STAT)

## 2023-03-26 PROCEDURE — 99291 PR CRITICAL CARE, E/M 30-74 MINUTES: ICD-10-PCS | Mod: ,,, | Performed by: INTERNAL MEDICINE

## 2023-03-26 PROCEDURE — 85730 THROMBOPLASTIN TIME PARTIAL: CPT | Mod: 91 | Performed by: STUDENT IN AN ORGANIZED HEALTH CARE EDUCATION/TRAINING PROGRAM

## 2023-03-26 PROCEDURE — 63600175 PHARM REV CODE 636 W HCPCS: Performed by: INTERNAL MEDICINE

## 2023-03-26 PROCEDURE — 25000003 PHARM REV CODE 250: Performed by: STUDENT IN AN ORGANIZED HEALTH CARE EDUCATION/TRAINING PROGRAM

## 2023-03-26 PROCEDURE — 94761 N-INVAS EAR/PLS OXIMETRY MLT: CPT

## 2023-03-26 PROCEDURE — 25000003 PHARM REV CODE 250: Performed by: REGISTERED NURSE

## 2023-03-26 PROCEDURE — 27200966 HC CLOSED SUCTION SYSTEM

## 2023-03-26 PROCEDURE — 99291 CRITICAL CARE FIRST HOUR: CPT | Mod: ,,, | Performed by: INTERNAL MEDICINE

## 2023-03-26 PROCEDURE — 83735 ASSAY OF MAGNESIUM: CPT | Performed by: HOSPITALIST

## 2023-03-26 PROCEDURE — 85025 COMPLETE CBC W/AUTO DIFF WBC: CPT | Performed by: HOSPITALIST

## 2023-03-26 PROCEDURE — 20000000 HC ICU ROOM

## 2023-03-26 PROCEDURE — 25000242 PHARM REV CODE 250 ALT 637 W/ HCPCS: Performed by: HOSPITALIST

## 2023-03-26 PROCEDURE — A4216 STERILE WATER/SALINE, 10 ML: HCPCS | Performed by: HOSPITALIST

## 2023-03-26 RX ADMIN — GLYCOPYRROLATE 1 MG: 1 TABLET ORAL at 08:03

## 2023-03-26 RX ADMIN — Medication 10 ML: at 01:03

## 2023-03-26 RX ADMIN — ASPIRIN 81 MG CHEWABLE TABLET 81 MG: 81 TABLET CHEWABLE at 08:03

## 2023-03-26 RX ADMIN — METOPROLOL TARTRATE 25 MG: 25 TABLET, FILM COATED ORAL at 08:03

## 2023-03-26 RX ADMIN — Medication 10 ML: at 06:03

## 2023-03-26 RX ADMIN — INSULIN ASPART 1 UNITS: 100 INJECTION, SOLUTION INTRAVENOUS; SUBCUTANEOUS at 08:03

## 2023-03-26 RX ADMIN — HYDROXYZINE HYDROCHLORIDE 25 MG: 25 TABLET ORAL at 08:03

## 2023-03-26 RX ADMIN — MUPIROCIN: 20 OINTMENT TOPICAL at 08:03

## 2023-03-26 RX ADMIN — PREDNISONE 50 MG: 50 TABLET ORAL at 08:03

## 2023-03-26 RX ADMIN — FUROSEMIDE 40 MG: 40 TABLET ORAL at 05:03

## 2023-03-26 RX ADMIN — POLYETHYLENE GLYCOL 3350 17 G: 17 POWDER, FOR SOLUTION ORAL at 08:03

## 2023-03-26 RX ADMIN — MELATONIN TAB 3 MG 9 MG: 3 TAB at 08:03

## 2023-03-26 RX ADMIN — ATORVASTATIN CALCIUM 80 MG: 40 TABLET, FILM COATED ORAL at 08:03

## 2023-03-26 RX ADMIN — IPRATROPIUM BROMIDE AND ALBUTEROL SULFATE 3 ML: 2.5; .5 SOLUTION RESPIRATORY (INHALATION) at 04:03

## 2023-03-26 RX ADMIN — INSULIN ASPART 2 UNITS: 100 INJECTION, SOLUTION INTRAVENOUS; SUBCUTANEOUS at 11:03

## 2023-03-26 RX ADMIN — GLYCOPYRROLATE 1 MG: 1 TABLET ORAL at 05:03

## 2023-03-26 RX ADMIN — FERROUS SULFATE TAB 325 MG (65 MG ELEMENTAL FE) 1 EACH: 325 (65 FE) TAB at 08:03

## 2023-03-26 RX ADMIN — LIDOCAINE 5% 2 PATCH: 700 PATCH TOPICAL at 01:03

## 2023-03-26 RX ADMIN — FUROSEMIDE 40 MG: 40 TABLET ORAL at 08:03

## 2023-03-26 RX ADMIN — HEPARIN SODIUM 19 UNITS/KG/HR: 10000 INJECTION, SOLUTION INTRAVENOUS at 08:03

## 2023-03-26 RX ADMIN — CLOPIDOGREL BISULFATE 75 MG: 75 TABLET ORAL at 08:03

## 2023-03-26 RX ADMIN — SODIUM CHLORIDE: 9 INJECTION, SOLUTION INTRAVENOUS at 11:03

## 2023-03-26 NOTE — PROGRESS NOTES
[facility] Medicine  Progress Note    Patient Name: Fareed Richard Jr.  MRN: [id number]  Patient Class: IP- Inpatient   Admission Date: 3/18/2023  Length of Stay: 7 days  Attending Physician: Luiz Patel MD  Primary Care Provider: Kodi Tubbs MD        Subjective:     Principal Problem:Hypotension        HPI:  Fareed Richard Jr. 73 y.o. 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  Per his wife 4 days ago he began to have pink tinged sputum via his trach.  He was seen by his ENT 2 days ago with a scope exam and a trach exchange without evidence of bleeding.  This morning at 3:00 a.m. he developed bright red blood via trach which resolved without intervention.  It is since not recurred.  He takes a daily aspirin.  He receives all medications via G-tube.  His tube feeding regimen consists of 6 cans of Isosource daily with 120 cc free water boluses.  He denies fever chest pain shortness of breath nausea vomiting abdominal pain leg swelling syncope dizziness dysuria melena hematuria hematemesis.    In the ED, hemoglobin 7.6 INR 1.0 BUN 50 creatinine 0.7  troponin negative BRCA negative O positive type and screen chest x-ray without detrimental change hypotensive to 85/54.      Overview/Hospital Course:  74 yo M w squamous CA of tongue s/p glossectomy and reconstructive flap with trach, CAD, chronic hypoxic respiratory failure (on 5L at home) and with peg presented for eval of hemoptysis 2 days after trach change in clinic.  Transferred to ICU 3/19 when markedly hypotensive.  Unclear if due to hemorrhage, cardiogenic or septic shock.  Did require PRBC and TXA nebs but didn't seem like sufficient bleeding to cause shock.  Bleeding has stopped and ENT is on board and following.  CTA shows either significant mucus plugging or bibasilar pneumonia - pulmonology favored  pneumonia.  Also has urine culture growing Enterococcus.  He is on broad spectrum antibiotics. Troponin checked due to hypotension and it was 1.17.  Cards consulted and feel it's demand due to ischemia and hypotension and recommend outpatient stress. AM cortisol was low so ordered cosyntropin stim test which shows an adequate adrenal response to r/o adrenal insufficiency.  Developed severe shoulder pain and hypoxia, increasing troponin and pulmonary edema on CXR - shoulder pain likely anginal equivalent. Discussed with Cardiology, started heparin and nitro drip. Required nitro drip for chest pain. Tolerating heparin drip without evidence of further tracheostomy bleeding. Further discussions with Cardiology, and brought patient for [facility]/angiogram on 3/23 which showed distal LM 30%, mild LAD 90% bifurcation lesion with D1, Cx mid 80%, RCA moderate diffuse disease with long 70% and some left to right collateral. All vessels heavily calcified. Not a candidate for CABG but plan for staged PCI. Continues on heparin drip, asa/plavix and tolerating. Off pressor support/nitro drip. No further bleeding and Hb remains stable. Had episodes of hypoxia/respiratory distress after vomiting and was intermittently placed on ventilator -  now back to trach collar close to home O2 requirements. Plan for angiogram/PCI on 3/27 with Dr. Bhatt.      Interval History:  no new issues, had small streak of blood from trach collar but no further episodes. He is doing well on current oxygen requirements and overall feels well. Hb stable. Updated patient and wife.     Objective:     Vital Signs (Most Recent):  Temp: 98 °F (36.7 °C) (03/26/23 1200)  Pulse: 83 (03/26/23 1500)  Resp: (!) 22 (03/26/23 1500)  BP: 132/71 (03/26/23 1500)  SpO2: (!) 93 % (03/26/23 1500)   Vital Signs (24h Range):  Temp:  [97.8 °F (36.6 °C)-98.1 °F (36.7 °C)] 98 °F (36.7 °C)  Pulse:  [] 83  Resp:  [17-46] 22  SpO2:  [89 %-97 %] 93 %  BP: (105-182)/(58-91) 132/71      Weight: 68 kg (150 lb)  Body mass index is 22.81 kg/m².    Intake/Output Summary (Last 24 hours) at 3/26/2023 1535  Last data filed at 3/26/2023 1405  Gross per 24 hour   Intake 1820.26 ml   Output 1300 ml   Net 520.26 ml        Physical Exam  Vitals and nursing note reviewed.   Constitutional:       General: He is not in acute distress.     Appearance: He is well-developed. He is ill-appearing. He is not diaphoretic.   HENT:      Head: Normocephalic and atraumatic.   Eyes:      General:         Right eye: No discharge.         Left eye: No discharge.      Conjunctiva/sclera: Conjunctivae normal.   Neck:      Thyroid: No thyromegaly.      Comments: Trach in place.  Streaked with blood.  Cardiovascular:      Rate and Rhythm: Normal rate and regular rhythm.      Heart sounds: No murmur heard.  Pulmonary:      Effort: Pulmonary effort is normal. No respiratory distress.      Breath sounds: Normal breath sounds.      Comments: Trach collar 10 L  Abdominal:      General: Bowel sounds are normal. There is no distension.      Palpations: Abdomen is soft. There is no mass.      Tenderness: There is no abdominal tenderness.      Comments: Peg tube in place   Musculoskeletal:         General: No deformity.   Skin:     General: Skin is warm and dry.   Neurological:      Mental Status: He is alert and oriented to person, place, and time.      Comments: Alert and awake, communicates (difficulty speaking with trach) at baseline   Psychiatric:      Comments: Anxious       Significant Labs: All pertinent labs within the past 24 hours have been reviewed.    Significant Imaging: I have reviewed all pertinent imaging results/findings within the past 24 hours.    Review of Systems      Assessment/Plan:      * Hypotension  Presented with hypotension and bleeding from tracheostomy/hemoptysis.  Patient does have lower blood pressure readings however this blood pressure was concerning in the 70s.  Patient did have some bleeding but did  "not suspect it was hemorrhagic.  Also thought to be septic due to possible UTI/pneumonia and being treated for antibiotics.  Also concerns for cardiogenic with an NSTEMI and being on nitro.  He was treated appropriately with antibiotics for his UTI/pneumonia and was also treated for NSTEMI.  Intermittently requiring pressor support with sedation after he was put on the ventilator.  Now on trach collar.  Hypotension has now resolved and he is doing well.      NSTEMI (non-ST elevated myocardial infarction)  -Denies chest pain but on 3/19 did have unexplained hypotension with severe back/shoulder pain  -Troponin on admit normal, but on 3/19 bumped to 1.7 and then 2.2 on 3/20.  -No jamil ischemic change on EKG  -Echo showed EF 60%, inferobasal hypokinesis, indeterminate diastolic function  -Cardiology consulted and input appreciated  -3/19 - Cardiology suspects this is due to demand ischemia associated with his anemia and hypotension.  Further, he is a poor candidate for angiogram given his hemoptysis and inability to treat with blood thinning medications.  Recommended outpatient stress testing  -Holding aspirin given hemoptysis and metoprolol given hypotension  -Continue statin.  Resume BB as able. On nitro drip for chest pain. - having difficulty weaning off. He is currently cleared for DAPT as no further bleeding issues at this time. Updated Cardiology.   -Patient underwent LHC/angiogram on 2/23 - findings below.  "3/23/23 LHC - EDP 13, distal LM 30%, mild LAD 90% bifurcation lesion with D1, Cx mid 80%, RCA moderate diffuse disease with long 70% and some left to right collateral. All vessels heavily calcified     Reviewed with Dr Malone - poor candidate for CABG. Will discuss staged PCI of LAD/Cx if able to tolerate DAPT without further pulmonary bleeding"    - Currently on ASA/Plavix and low dose heparin infusion. Cardiology discussing intervention with patient and wife. Plans for LHC on Monday 3/27.        UTI " (urinary tract infection)  -Urine culture growing Enterococcus > 100,000 cfu/ml.  Sensitive to Vanc/Ampicillin  -Continue broad spectrum antibiotics for now and tailor based off results.  - has completed antibiotics.    Hemoptysis  -Treatment as above. This has resolved.    PEG (percutaneous endoscopic gastrostomy) status  -Continue medications via PEG. Does not take oral intake  -On isosource 1.5 6 days daily with 120cc free water flushes via wife  -Will continue home tube feedings, with nutrition consulted    Tracheostomy present  -Present on admit with bleeding / hemoptysis  -Treating with txa nebs and bleeding appears to have resolved  -ENT - appreciate recommendations  -Continue home glycopyrrolate and guaifensin  -Continue supplemental oxygen    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   -TSH is normal and he is not on treatment as outpatient    Acute respiratory failure with hypoxia  Patient with Hypoxic Respiratory failure which is Acute on chronic.  he is on home oxygen at 2 LPM. Supplemental oxygen was provided and noted- Oxygen Concentration (%):  [40] 40.   Signs/symptoms of respiratory failure include- tachypnea and increased work of breathing. Contributing diagnoses includes - Aspiration and Pneumonia Labs and images were reviewed. Patient Has not had a recent ABG. Will treat underlying causes and adjust management of respiratory failure as follows   - changed out trach and now placed to ventilator for positive pressure  - had secretions in trach with exchange. Appears to be more comfortable now.    Acute on chronic respiratory failure with hypoxia  -Patient with acute on chronic hypoxic respiratory failure  -At home is on 5L O2 via trach and O2 sats typically in high 80s low 90s  -Sats presently similar, but is having episodes of hemoptysis  -Acute etiology is likely due to pneumonia, mucus plugging and hemoptysis.  -Consulted pulm-critical care and input  appreciated  -Discussed with ENT on call at [facility] 3/19 and OK to keep at [facility]. Dr. French evaluated.  -Continue antibiotics as above.  -Continue supplemental O2 via Trach and wean as able. Placed on mechanical ventilation on 3/21 for respiratory distress and increasing oxygen requirements.  Slowly weaning at this time.  Appears more comfortable today. Weaned off vent on 3/23. No need for ventilator at night.  - Currently on 10 liters trach collar    Acute blood loss anemia  -Hb on admit 7.6 and this morning 8.0  -Baseline Hb 8-9.  -Presented w intermittent hemoptysis via trach x 2 days. Had another episode on morning of 3/19/2023  -Seen at ENT 2 days prior to admit (Dr. Smalls) with exam without source. Trache exchanged at that time.   -Ferritin only 84 and Tsat 18% - consistent with iron deficiency  -Platelets and PT/INR are normal.  -Treated with TXA nebulizer and bleeding has stopped  -Started FeSO4 which he will need at discharge  -Repeat cbc in AM. Hb 7.7 but stable, no further evidence of bleeding. Again 7.1 but no further bleeding.   Stable at 8.2 - 8.3  - no further episodes of bleeding and tolerating heparin/plavix    Other hyperlipidemia  -Continue home statin    Primary hypertension  -BP typically in the 90s to low 100s systolic per chart review.   -Noted hypotension 3/19 which is addressed above.  -Holding home metoprolol and amlodipine    Coronary artery disease involving native coronary artery of native heart with unstable angina pectoris  -Denies chest pain but on 3/19 did have unexplained hypotension with severe back/shoulder pain  -Troponin on admit normal, but on 3/19 bumped to 1.7 and then 2.2 on 3/20.  -No jamil ischemic change on EKG  -Echo showed EF 60%, inferobasal hypokinesis, indeterminate diastolic function  -Cardiology consulted and input appreciated  -Cardiology suspects this is due to demand ischemia associated with his anemia and hypotension.  Further, he is a poor candidate for  "angiogram given his hemoptysis and inability to treat with blood thinning medications.  Recommended outpatient stress testing  -Holding aspirin given hemoptysis and metoprolol given hypotension  -Continue statin.  Resume BB as able. On nitro drip for chest pain. - having difficulty weaning off. He is currently cleared for DAPT as no further bleeding issues at this time. Updated Cardiology.   -Patient underwent LHC/angiogram on 2/23 - findings below.  "3/23/23 LHC - EDP 13, distal LM 30%, mild LAD 90% bifurcation lesion with D1, Cx mid 80%, RCA moderate diffuse disease with long 70% and some left to right collateral. All vessels heavily calcified     Reviewed with Dr Malone - poor candidate for CABG. Will discuss staged PCI of LAD/Cx if able to tolerate DAPT without further pulmonary bleeding"    - Currently on ASA/Plavix and low dose heparin infusion. Cardiology discussing intervention with patient and wife today. No intervention planned for today.      Squamous cell cancer of tongue  -Diagnosed 12/15/21 via FNA.  Underwent total glossectomy, bilateral neck dissection, bilateral cervical facial flaps and anterolateral thigh flap reconstruction of glossectomy defect 1/4/22  -Completed adjuvant chemoradiation  -Followed by oncology and ENT outpt. No longer on chemo or radiation.   -Had trach changed by ENT 2 days prior to admit and scope at that time showed no signs of bleeding.   -Treatment as above.      VTE Risk Mitigation (From admission, onward)         Ordered     heparin 25,000 units in dextrose 5% 250 ml (100 units/mL) infusion MINIMAL INTENSITY nomogram - [facility]  Continuous        Question Answer Comment   Heparin Infusion Adjustment (DO NOT MODIFY ANSWER) [email or web address]\Images\[email or web address]\HeparinInfusions\heparin MINIMAL  INTENSITY nomogram for [facility] [id number]    Begin at (in units/kg/hr) 12        03/24/23 0950     IP VTE HIGH RISK PATIENT  Once         03/18/23 1620     Place sequential compression device  " Until discontinued         03/18/23 1620     Place NIKITA hose  Until discontinued         03/18/23 1620                Discharge Planning   NISA:      Code Status: Full Code   Is the patient medically ready for discharge?:     Reason for patient still in hospital (select all that apply): Treatment  Discharge Plan A: Home with family            Critical care time spent on the evaluation and treatment of severe organ dysfunction, review of pertinent labs and imaging studies, discussions with consulting providers and discussions with patient/family: 20 minutes.      Luiz Patel MD  Department of Hospital Medicine   [facility] [place] [facility] [facility]

## 2023-03-26 NOTE — ASSESSMENT & PLAN NOTE
-Patient with acute on chronic hypoxic respiratory failure  -At home is on 5L O2 via trach and O2 sats typically in high 80s low 90s  -Sats presently similar, but is having episodes of hemoptysis  -Acute etiology is likely due to pneumonia, mucus plugging and hemoptysis.  -Consulted pulm-critical care and input appreciated  -Discussed with ENT on call at [facility] 3/19 and OK to keep at [facility]. Dr. French evaluated.  -Continue antibiotics as above.  -Continue supplemental O2 via Trach and wean as able. Placed on mechanical ventilation on 3/21 for respiratory distress and increasing oxygen requirements.  Slowly weaning at this time.  Appears more comfortable today. Weaned off vent on 3/23. No need for ventilator at night.  - Currently on 10 liters trach collar

## 2023-03-26 NOTE — ASSESSMENT & PLAN NOTE
-Urine culture growing Enterococcus > 100,000 cfu/ml.  Sensitive to Vanc/Ampicillin  -Continue broad spectrum antibiotics for now and tailor based off results.  - has completed antibiotics.

## 2023-03-26 NOTE — SUBJECTIVE & OBJECTIVE
Review of Systems   Reason unable to perform ROS: trach.   Objective:     Vital Signs (Most Recent):  Temp: 98 °F (36.7 °C) (03/26/23 1200)  Pulse: 80 (03/26/23 1300)  Resp: 20 (03/26/23 1300)  BP: 119/61 (03/26/23 1300)  SpO2: (!) 92 % (03/26/23 1300)   Vital Signs (24h Range):  Temp:  [97.8 °F (36.6 °C)-98.2 °F (36.8 °C)] 98 °F (36.7 °C)  Pulse:  [] 80  Resp:  [17-46] 20  SpO2:  [89 %-97 %] 92 %  BP: (105-182)/(58-91) 119/61     Weight: 68 kg (150 lb)  Body mass index is 22.81 kg/m².     SpO2: (!) 92 %         Intake/Output Summary (Last 24 hours) at 3/26/2023 1350  Last data filed at 3/26/2023 1200  Gross per 24 hour   Intake 1744.62 ml   Output 1050 ml   Net 694.62 ml         Lines/Drains/Airways       Peripherally Inserted Central Catheter Line  Duration             PICC Triple Lumen 03/20/23 2145 right basilic 5 days              Drain  Duration                  Gastrostomy/Enterostomy 01/04/22 Percutaneous endoscopic gastrostomy (PEG)  days              Airway  Duration             Adult Surgical Airway 03/16/23 1014 Shiley Uncuffed 6.0 10 days              Peripheral Intravenous Line  Duration                  Peripheral IV - Single Lumen 03/20/23 1825 22 G Anterior;Right Forearm 5 days                  Examunchanged vs 3/25/23  Physical Exam  Constitutional:       General: He is not in acute distress.     Appearance: He is well-developed. He is ill-appearing. He is not toxic-appearing or diaphoretic.   HENT:      Head: Normocephalic and atraumatic.   Eyes:      General: No scleral icterus.     Extraocular Movements: Extraocular movements intact.      Conjunctiva/sclera: Conjunctivae normal.      Pupils: Pupils are equal, round, and reactive to light.   Neck:      Thyroid: No thyromegaly.      Vascular: No JVD.      Trachea: No tracheal deviation.      Comments: Trach in place  Cardiovascular:      Rate and Rhythm: Normal rate and regular rhythm.      Pulses:           Radial pulses are 2+ on  the right side.      Heart sounds: S1 normal and S2 normal. Heart sounds are distant. No murmur heard.    No friction rub. No gallop.   Pulmonary:      Effort: Pulmonary effort is normal. No respiratory distress.      Breath sounds: Normal breath sounds. No stridor. No wheezing, rhonchi or rales.   Chest:      Chest wall: No tenderness.   Abdominal:      General: There is no distension.      Palpations: Abdomen is soft.   Musculoskeletal:         General: No swelling or tenderness. Normal range of motion.      Cervical back: Normal range of motion and neck supple. No rigidity.      Right lower leg: No edema.      Left lower leg: No edema.   Skin:     General: Skin is warm and dry.      Coloration: Skin is not jaundiced.   Neurological:      General: No focal deficit present.      Mental Status: He is oriented to person, place, and time.      Cranial Nerves: No cranial nerve deficit.   Psychiatric:         Mood and Affect: Mood normal.         Behavior: Behavior normal.       Current Medications:   aspirin  81 mg Oral Daily    atorvastatin  80 mg Per G Tube Daily    clopidogreL  75 mg Oral Daily    ferrous sulfate  1 tablet Per G Tube Daily    furosemide  40 mg Per G Tube BID    glycopyrrolate  1 mg Per G Tube TID    LIDOcaine  2 patch Transdermal Q24H    melatonin  9 mg Per G Tube Nightly    metoprolol tartrate  25 mg Per G Tube BID    mupirocin   Nasal BID    polyethylene glycol  17 g Oral BID    sodium chloride 0.9%  10 mL Intravenous Q6H      [START ON 3/27/2023] sodium chloride 0.9%      dexmedeTOMIDine (Precedex) infusion (titrating) Stopped (03/24/23 1100)    heparin (porcine) in D5W 19 Units/kg/hr (03/26/23 0826)    NORepinephrine bitartrate-D5W Stopped (03/22/23 0627)     sodium chloride, acetaminophen, acetaminophen, albuterol-ipratropium, fentaNYL, guaiFENesin 100 mg/5 ml, HYDROcodone-acetaminophen, hydrOXYzine HCL, insulin aspart U-100, naloxone, nitroGLYCERIN, ondansetron, oxyCODONE, Flushing PICC  Protocol **AND** sodium chloride 0.9% **AND** sodium chloride 0.9%    Laboratory (all labs reviewed):  CBC:  Recent Labs   Lab 03/22/23  0450 03/23/23  0332 03/24/23  0357 03/25/23  0429 03/26/23  0357   WBC 6.14 10.27  10.27 9.55 9.74 8.76   Hemoglobin 7.5 L 7.1 L  7.1 L 7.8 L 8.2 L 8.3 L   Hematocrit 23.1 L 21.2 L  21.2 L 24.0 L 25.5 L 26.0 L   Platelets 199 185  185 217 245 233         CHEMISTRIES:  Recent Labs   Lab 05/10/22  1031 05/11/22  0412 05/12/22  0343 05/14/22  0724 05/17/22  1007 08/24/22  1000 03/19/23  0413 03/20/23  0205 03/21/23  0242 03/22/23  0233 03/23/23  0332 03/24/23  0357 03/25/23  0429 03/26/23  0357   Glucose 130 H 103 110 96 162 H   < > 132 H 146 H 177 H 181 H 178 H  --   --   --    Sodium 140 139 135 L 136 133 L   < > 144 143 139 137 135 L  --   --   --    Potassium 4.6 5.0 4.4 4.1 5.0   < > 4.8 4.2 3.7 4.2 3.8  --   --   --    BUN 22 26 H 24 H 18 15   < > 44 H 25 H 19 24 H 33 H  --   --   --    Creatinine 0.8 0.7 0.7 0.7 0.7   < > 0.7 0.6 0.7 0.8 0.7  --   --   --    eGFR if non African American >60.0 >60.0 >60.0 >60 >60.0  --   --   --   --   --   --   --   --   --    eGFR  --   --   --   --   --    < > >60 >60 >60 >60 >60  --   --   --    Calcium 9.7 9.7 9.1 9.6 9.5   < > 8.2 L 8.4 L 8.3 L 8.1 L 8.0 L  --   --   --    Magnesium  --  2.3 2.0  --  2.0   < >  --  2.0 1.8 1.9 2.1 2.2 2.4 2.3    < > = values in this interval not displayed.         CARDIAC BIOMARKERS:  Recent Labs   Lab 09/14/22  0459 03/18/23  1106 03/19/23  1308 03/19/23  1840 03/20/23  1642   Troponin I 0.010 0.010 1.736 H 2.222 H 3.416 H         COAGS:  Recent Labs   Lab 01/04/22  0405 01/12/22  0510 09/14/22  0641 03/18/23  1106 03/20/23  1744   INR 0.9 0.9 1.0 1.0 1.0         LIPIDS/LFTS:  Recent Labs   Lab 09/23/22  1247 09/30/22  0317 11/23/22  0733 03/18/23  1106 03/19/23  0413   AST 13 16 17 16 22   ALT 13 16 14 16 15         BNP:  Recent Labs   Lab 05/14/22  0725 09/14/22  0459 09/23/22  1247 03/18/23  1106  03/21/23  1525   BNP 98 185 H 468 H 136 H 2,543 H         TSH:  Recent Labs   Lab 02/16/22  1157 08/24/22  1000 11/23/22  0733 01/24/23  1105 03/19/23  1308   TSH 1.332 2.279 4.924 H 4.861 H 2.787         Free T4:  Recent Labs   Lab 11/23/22  0733 01/24/23  1105   Free T4 0.86 0.90         Diagnostic Results:  Echo: 3/20/23  The left ventricle is normal in size with concentric hypertrophy and normal systolic function.  The estimated ejection fraction is 60%. Inferobasal hypokinesis  Indeterminate left ventricular diastolic function.  Normal right ventricular size with normal right ventricular systolic function.  Mild left atrial enlargement.  Mild right atrial enlargement.  Normal central venous pressure (3 mmHg).  The estimated PA systolic pressure is 13 mmHg.  The sinuses of Valsalva is mildly dilated. 4.2cm.    Cath: 3/23/23 (images personally reviewed and interpreted)  3/23/23 LHC - EDP 13, distal LM 30%, mild LAD 90% bifurcation lesion with D1, Cx mid 80%, RCA moderate diffuse disease with long 70% and some left to right collateral. All vessels heavily calcified  Reviewed with Dr Malone - poor candidate for CABG. Will discuss staged PCI of LAD/Cx if able to tolerate DAPT without further pulmonary bleeding

## 2023-03-26 NOTE — ASSESSMENT & PLAN NOTE
Remote Hx PCI to RCA 2000. Troponin up to 2.2. Denies CP. EKG with mild NSSTT changes. Likely demand ischemia from hypotension and anemia. Poor candidate for anticoagulation or LHC with high bleeding risk and hemoptysis. Check echo - if stable ok for out patient ischemic evaluation when able to tolerate anticoagulation  3/21/23 Troponin increased to 3. Had rate related LBBB that has since resolved. Denies CP. EF 60% on echo with inferobasal HK. Tolerating heparin so far.   3/23/23: cath with prox LAD/D1 (?culprit), LCx and RCA stenoses.  Poor surgical candidate.    3/24/23: no further CP.  Tolerating DAPT.  Cont statin.  Will resume heparin IV.  Will discuss possible high risk PCI with wife when she arrives.    3/25/23: discussed with wife yesterday.  Cath permit signed.      Tolerating DAPT/IV heparin  Cont statin  Cont metoprolol 25mg bid  Cath/PCI in AM 3/27.

## 2023-03-26 NOTE — ASSESSMENT & PLAN NOTE
Patient with Hypoxic Respiratory failure which is Acute on chronic.  he is on home oxygen at 2 LPM. Supplemental oxygen was provided and noted- Oxygen Concentration (%):  [40] 40.   Signs/symptoms of respiratory failure include- tachypnea and increased work of breathing. Contributing diagnoses includes - Aspiration and Pneumonia Labs and images were reviewed. Patient Has not had a recent ABG. Will treat underlying causes and adjust management of respiratory failure as follows   - changed out trach and now placed to ventilator for positive pressure  - had secretions in trach with exchange. Appears to be more comfortable now.

## 2023-03-26 NOTE — SUBJECTIVE & OBJECTIVE
Initial SW/CM Assessment/Plan of Care Note     Baseline Assessment   77 year old admitted 5/9/2020 as Inpatient. Prior to admission patient was living with Adult children and residing at House.    Patient’s Primary Care Provider is Carlie Rodriguez MD.     Progress Note   I spoke to pt's daughter Camila Reynoso over phone for discharge planning assessment. I verified with Camila pt's demographics & PCP. Camila states she is not working, she moved in with her mother 1.5-2 yrs ago to take care of her. Camila states pt had [facility] but it was discontinued due to Covid-19. Camila confirms she would use [facility] again for home health.  I notified Camila that PT recommended daily PT on discharge which would be done at a snf. Camila states pt has been to [facility] before & liked it there. Camila states she would like a referral to [facility] & her mom to go there if insurance will pay for it. I left  for Neisha  to make referral to [facility] for pt per her daughter Camila's request.    Funcational Status Prior to Admission   Functional Status  Ambulation: Walker  Bathing: Family  Dressing: Independent/Self  Toileting: Independent/Self  Meal Preparation: Family  Shopping: Family  Medication Preparation: Family  Medication Administration: Family  Housekeeping: Family  Laundry: Family  Laundry Location: Main Level  Transportation: Family         Agency/Support   Type of Services Prior to Hospitalization: None   Support Systems: Children   Home Devices/Equipment: Shower chair, Blood glucose monitor, Elevated toilet seat, Mobility assist device(pulse ox)   Mobility Assist Devices: Front-wheeled walker   Sensory Support Devices: None     Therapy Recommendations  Recommendation for Discharge: PT IL: Patient needs daily, skilled therapy for 1-3 hours a day by at least two disciplines       Insurance   Primary: [employer]   Secondary: N/A     Barriers to Discharge  Interval History:  no new issues, had small streak of blood from trach collar but no further episodes. He is doing well on current oxygen requirements and overall feels well. Hb stable. Updated patient and wife.     Objective:     Vital Signs (Most Recent):  Temp: 98 °F (36.7 °C) (03/26/23 1200)  Pulse: 83 (03/26/23 1500)  Resp: (!) 22 (03/26/23 1500)  BP: 132/71 (03/26/23 1500)  SpO2: (!) 93 % (03/26/23 1500)   Vital Signs (24h Range):  Temp:  [97.8 °F (36.6 °C)-98.1 °F (36.7 °C)] 98 °F (36.7 °C)  Pulse:  [] 83  Resp:  [17-46] 22  SpO2:  [89 %-97 %] 93 %  BP: (105-182)/(58-91) 132/71     Weight: 68 kg (150 lb)  Body mass index is 22.81 kg/m².    Intake/Output Summary (Last 24 hours) at 3/26/2023 1535  Last data filed at 3/26/2023 1405  Gross per 24 hour   Intake 1820.26 ml   Output 1300 ml   Net 520.26 ml        Physical Exam  Vitals and nursing note reviewed.   Constitutional:       General: He is not in acute distress.     Appearance: He is well-developed. He is ill-appearing. He is not diaphoretic.   HENT:      Head: Normocephalic and atraumatic.   Eyes:      General:         Right eye: No discharge.         Left eye: No discharge.      Conjunctiva/sclera: Conjunctivae normal.   Neck:      Thyroid: No thyromegaly.      Comments: Trach in place.  Streaked with blood.  Cardiovascular:      Rate and Rhythm: Normal rate and regular rhythm.      Heart sounds: No murmur heard.  Pulmonary:      Effort: Pulmonary effort is normal. No respiratory distress.      Breath sounds: Normal breath sounds.      Comments: Trach collar 10 L  Abdominal:      General: Bowel sounds are normal. There is no distension.      Palpations: Abdomen is soft. There is no mass.      Tenderness: There is no abdominal tenderness.      Comments: Peg tube in place   Musculoskeletal:         General: No deformity.   Skin:     General: Skin is warm and dry.   Neurological:      Mental Status: He is alert and oriented to person, place, and time.       Comments: Alert and awake, communicates (difficulty speaking with trach) at baseline   Psychiatric:      Comments: Anxious       Significant Labs: All pertinent labs within the past 24 hours have been reviewed.    Significant Imaging: I have reviewed all pertinent imaging results/findings within the past 24 hours.    Review of Systems     Identified Barriers to Discharge/Transition Planning: Assessment/stabilization in progress     Plan   Discharge/Care Planning  Patient's Anticipated Discharge Needs: Discharge to skilled care/nursing home (T)  Patient/Family Discharge Goal: SNF  Identified Barriers to Discharge/Transitions: Assessment/stabilization in progress  Planned Discharge/Disposition: Skilled Nursing Facility Including SNF Care for Subacute and Rehab  Patient/Family Agree to Plan: Patient unable to agree with goals & plan, Family/S.O./Caregiver agrees   SW/CM - Recommendations for Discharge:   snf  List Provided (if applicable):    Referral to (if applicable):  [facility]  Disclosure of [facility] Affiliation (if applicable):      Anticipate patient cannot return to the environment from which patient entered the hospital.     Anticipate patient cannot provide self-care at discharge.     Refer to SW/CM Assessment flowsheet for objective data.

## 2023-03-26 NOTE — ASSESSMENT & PLAN NOTE
-Hb on admit 7.6 and this morning 8.0  -Baseline Hb 8-9.  -Presented w intermittent hemoptysis via trach x 2 days. Had another episode on morning of 3/19/2023  -Seen at ENT 2 days prior to admit (Dr. Smalls) with exam without source. Trache exchanged at that time.   -Ferritin only 84 and Tsat 18% - consistent with iron deficiency  -Platelets and PT/INR are normal.  -Treated with TXA nebulizer and bleeding has stopped  -Started FeSO4 which he will need at discharge  -Repeat cbc in AM. Hb 7.7 but stable, no further evidence of bleeding. Again 7.1 but no further bleeding.   Stable at 8.2 - 8.3  - no further episodes of bleeding and tolerating heparin/plavix

## 2023-03-26 NOTE — PROGRESS NOTES
[facility]  Cardiology  Progress Note    Patient Name: Fareed Richard Jr.  MRN: [id number]  Admission Date: 3/18/2023  Hospital Length of Stay: 7 days  Code Status: Full Code   Attending Physician: Luiz Patel MD   Primary Care Physician: Kodi Tubbs MD  Expected Discharge Date:   Principal Problem:Hypotension    Subjective:     Hospital Course:   3/21/23 Developed increased SOB last PM. Troponin increased to 3. EKG with LBBB associated with sinus tachycardia that resolved when HR improved. Started on heparin which he has tolerated so far without further hemoptysis. Low dose tridil     3/22/23 Awake on tach collar. Denies CP. Less SOB. HCT continues to drop on heparin. No further hemoptysis. ST depression noted on telemetry. Good UOP to lasix 40 mg IV last PM. BNP 2543    3/23/23 LHC - EDP 13, distal LM 30%, mild LAD 90% bifurcation lesion with D1, Cx mid 80%, RCA moderate diffuse disease with long 70% and some left to right collateral. All vessels heavily calcified  Reviewed with Dr Malone - poor candidate for CABG. Will discuss staged PCI of LAD/Cx if able to tolerate DAPT without further pulmonary bleeding.    Interval Hx: pt seen in ICU, wife at bedside.  No cp/sob.  Plan for PCI LAD +/- LCx on 3/27.      Tele: SR 80s (personally reviewed and interpreted)          Review of Systems   Reason unable to perform ROS: trach.   Objective:     Vital Signs (Most Recent):  Temp: 98 °F (36.7 °C) (03/26/23 1200)  Pulse: 80 (03/26/23 1300)  Resp: 20 (03/26/23 1300)  BP: 119/61 (03/26/23 1300)  SpO2: (!) 92 % (03/26/23 1300)   Vital Signs (24h Range):  Temp:  [97.8 °F (36.6 °C)-98.2 °F (36.8 °C)] 98 °F (36.7 °C)  Pulse:  [] 80  Resp:  [17-46] 20  SpO2:  [89 %-97 %] 92 %  BP: (105-182)/(58-91) 119/61     Weight: 68 kg (150 lb)  Body mass index is 22.81 kg/m².     SpO2: (!) 92 %         Intake/Output Summary (Last 24 hours) at 3/26/2023 1350  Last data filed at 3/26/2023 1200  Gross per 24 hour    Intake 1744.62 ml   Output 1050 ml   Net 694.62 ml         Lines/Drains/Airways       Peripherally Inserted Central Catheter Line  Duration             PICC Triple Lumen 03/20/23 2145 right basilic 5 days              Drain  Duration                  Gastrostomy/Enterostomy 01/04/22 Percutaneous endoscopic gastrostomy (PEG)  days              Airway  Duration             Adult Surgical Airway 03/16/23 1014 Shiley Uncuffed 6.0 10 days              Peripheral Intravenous Line  Duration                  Peripheral IV - Single Lumen 03/20/23 1825 22 G Anterior;Right Forearm 5 days                  Examunchanged vs 3/25/23  Physical Exam  Constitutional:       General: He is not in acute distress.     Appearance: He is well-developed. He is ill-appearing. He is not toxic-appearing or diaphoretic.   HENT:      Head: Normocephalic and atraumatic.   Eyes:      General: No scleral icterus.     Extraocular Movements: Extraocular movements intact.      Conjunctiva/sclera: Conjunctivae normal.      Pupils: Pupils are equal, round, and reactive to light.   Neck:      Thyroid: No thyromegaly.      Vascular: No JVD.      Trachea: No tracheal deviation.      Comments: Trach in place  Cardiovascular:      Rate and Rhythm: Normal rate and regular rhythm.      Pulses:           Radial pulses are 2+ on the right side.      Heart sounds: S1 normal and S2 normal. Heart sounds are distant. No murmur heard.    No friction rub. No gallop.   Pulmonary:      Effort: Pulmonary effort is normal. No respiratory distress.      Breath sounds: Normal breath sounds. No stridor. No wheezing, rhonchi or rales.   Chest:      Chest wall: No tenderness.   Abdominal:      General: There is no distension.      Palpations: Abdomen is soft.   Musculoskeletal:         General: No swelling or tenderness. Normal range of motion.      Cervical back: Normal range of motion and neck supple. No rigidity.      Right lower leg: No edema.      Left lower leg:  No edema.   Skin:     General: Skin is warm and dry.      Coloration: Skin is not jaundiced.   Neurological:      General: No focal deficit present.      Mental Status: He is oriented to person, place, and time.      Cranial Nerves: No cranial nerve deficit.   Psychiatric:         Mood and Affect: Mood normal.         Behavior: Behavior normal.       Current Medications:   aspirin  81 mg Oral Daily    atorvastatin  80 mg Per G Tube Daily    clopidogreL  75 mg Oral Daily    ferrous sulfate  1 tablet Per G Tube Daily    furosemide  40 mg Per G Tube BID    glycopyrrolate  1 mg Per G Tube TID    LIDOcaine  2 patch Transdermal Q24H    melatonin  9 mg Per G Tube Nightly    metoprolol tartrate  25 mg Per G Tube BID    mupirocin   Nasal BID    polyethylene glycol  17 g Oral BID    sodium chloride 0.9%  10 mL Intravenous Q6H      [START ON 3/27/2023] sodium chloride 0.9%      dexmedeTOMIDine (Precedex) infusion (titrating) Stopped (03/24/23 1100)    heparin (porcine) in D5W 19 Units/kg/hr (03/26/23 0826)    NORepinephrine bitartrate-D5W Stopped (03/22/23 0627)     sodium chloride, acetaminophen, acetaminophen, albuterol-ipratropium, fentaNYL, guaiFENesin 100 mg/5 ml, HYDROcodone-acetaminophen, hydrOXYzine HCL, insulin aspart U-100, naloxone, nitroGLYCERIN, ondansetron, oxyCODONE, Flushing PICC Protocol **AND** sodium chloride 0.9% **AND** sodium chloride 0.9%    Laboratory (all labs reviewed):  CBC:  Recent Labs   Lab 03/22/23  0450 03/23/23  0332 03/24/23  0357 03/25/23  0429 03/26/23  0357   WBC 6.14 10.27  10.27 9.55 9.74 8.76   Hemoglobin 7.5 L 7.1 L  7.1 L 7.8 L 8.2 L 8.3 L   Hematocrit 23.1 L 21.2 L  21.2 L 24.0 L 25.5 L 26.0 L   Platelets 199 185  185 217 245 233         CHEMISTRIES:  Recent Labs   Lab 05/10/22  1031 05/11/22  0412 05/12/22  0343 05/14/22  0724 05/17/22  1007 08/24/22  1000 03/19/23  0413 03/20/23  0205 03/21/23  0242 03/22/23  0233 03/23/23  0332 03/24/23  0357 03/25/23  0429  03/26/23  0357   Glucose 130 H 103 110 96 162 H   < > 132 H 146 H 177 H 181 H 178 H  --   --   --    Sodium 140 139 135 L 136 133 L   < > 144 143 139 137 135 L  --   --   --    Potassium 4.6 5.0 4.4 4.1 5.0   < > 4.8 4.2 3.7 4.2 3.8  --   --   --    BUN 22 26 H 24 H 18 15   < > 44 H 25 H 19 24 H 33 H  --   --   --    Creatinine 0.8 0.7 0.7 0.7 0.7   < > 0.7 0.6 0.7 0.8 0.7  --   --   --    eGFR if non African American >60.0 >60.0 >60.0 >60 >60.0  --   --   --   --   --   --   --   --   --    eGFR  --   --   --   --   --    < > >60 >60 >60 >60 >60  --   --   --    Calcium 9.7 9.7 9.1 9.6 9.5   < > 8.2 L 8.4 L 8.3 L 8.1 L 8.0 L  --   --   --    Magnesium  --  2.3 2.0  --  2.0   < >  --  2.0 1.8 1.9 2.1 2.2 2.4 2.3    < > = values in this interval not displayed.         CARDIAC BIOMARKERS:  Recent Labs   Lab 09/14/22  0459 03/18/23  1106 03/19/23  1308 03/19/23  1840 03/20/23  1642   Troponin I 0.010 0.010 1.736 H 2.222 H 3.416 H         COAGS:  Recent Labs   Lab 01/04/22  0405 01/12/22  0510 09/14/22  0641 03/18/23  1106 03/20/23  1744   INR 0.9 0.9 1.0 1.0 1.0         LIPIDS/LFTS:  Recent Labs   Lab 09/23/22  1247 09/30/22  0317 11/23/22  0733 03/18/23  1106 03/19/23  0413   AST 13 16 17 16 22   ALT 13 16 14 16 15         BNP:  Recent Labs   Lab 05/14/22  0725 09/14/22  0459 09/23/22  1247 03/18/23  1106 03/21/23  1525   BNP 98 185 H 468 H 136 H 2,543 H         TSH:  Recent Labs   Lab 02/16/22  1157 08/24/22  1000 11/23/22  0733 01/24/23  1105 03/19/23  1308   TSH 1.332 2.279 4.924 H 4.861 H 2.787         Free T4:  Recent Labs   Lab 11/23/22  0733 01/24/23  1105   Free T4 0.86 0.90         Diagnostic Results:  Echo: 3/20/23   The left ventricle is normal in size with concentric hypertrophy and normal systolic function.   The estimated ejection fraction is 60%. Inferobasal hypokinesis   Indeterminate left ventricular diastolic function.   Normal right ventricular size with normal right ventricular systolic  function.   Mild left atrial enlargement.   Mild right atrial enlargement.   Normal central venous pressure (3 mmHg).   The estimated PA systolic pressure is 13 mmHg.   The sinuses of Valsalva is mildly dilated. 4.2cm.    Cath: 3/23/23 (images personally reviewed and interpreted)  3/23/23 C - EDP 13, distal LM 30%, mild LAD 90% bifurcation lesion with D1, Cx mid 80%, RCA moderate diffuse disease with long 70% and some left to right collateral. All vessels heavily calcified  Reviewed with Dr Malone - poor candidate for CABG. Will discuss staged PCI of LAD/Cx if able to tolerate DAPT without further pulmonary bleeding      Assessment and Plan:     * Hypotension  Possibly from bleeding.  Resolved.    Squamous cell cancer of tongue  Per IM    NSTEMI (non-ST elevated myocardial infarction)  As per CAD section    Coronary artery disease involving native coronary artery of native heart with unstable angina pectoris  Remote Hx PCI to RCA 2000. Troponin up to 2.2. Denies CP. EKG with mild NSSTT changes. Likely demand ischemia from hypotension and anemia. Poor candidate for anticoagulation or [facility] with high bleeding risk and hemoptysis. Check echo - if stable ok for out patient ischemic evaluation when able to tolerate anticoagulation  3/21/23 Troponin increased to 3. Had rate related LBBB that has since resolved. Denies CP. EF 60% on echo with inferobasal HK. Tolerating heparin so far.   3/23/23: cath with prox LAD/D1 (?culprit), LCx and RCA stenoses.  Poor surgical candidate.    3/24/23: no further CP.  Tolerating DAPT.  Cont statin.  Will resume heparin IV.  Will discuss possible high risk PCI with wife when she arrives.    3/25/23: discussed with wife yesterday.  Cath permit signed.      Tolerating DAPT/IV heparin  Cont statin  Cont metoprolol 25mg bid  Cath/PCI in AM 3/27.          Hemoptysis  Per pulmonary. No further bleeding noted.  Tolerating DAPT/IV heparin.    Primary hypertension  Cont med rx as  tolerated.    Other hyperlipidemia  Cont atorva 80mg  Check lipids/LFT 6 months (Sept 2023)    Acute blood loss anemia  As above  HGB stable    Acute on chronic respiratory failure with hypoxia  Mgmt per IM/pulm    Tracheostomy present  Per ENT/pulm    Elevated brain natriuretic peptide (BNP) level  Does not appear grossly vol overloaded at present.        VTE Risk Mitigation (From admission, onward)         Ordered     heparin 25,000 units in dextrose 5% 250 ml (100 units/mL) infusion MINIMAL INTENSITY nomogram - [facility]  Continuous        Question Answer Comment   Heparin Infusion Adjustment (DO NOT MODIFY ANSWER) [email or web address]\[email or web address] MINIMAL  INTENSITY nomogram for [facility] [id number]    Begin at (in units/kg/hr) 12        03/24/23 0950     IP VTE HIGH RISK PATIENT  Once         03/18/23 1620     Place sequential compression device  Until discontinued         03/18/23 1620     Place NIKITA hose  Until discontinued         03/18/23 1620              Pt seen in ICU, critical care time 35min.    Tim Bhatt MD  Cardiology  [facility] - Intensive Care

## 2023-03-26 NOTE — ASSESSMENT & PLAN NOTE
Presented with hypotension and bleeding from tracheostomy/hemoptysis.  Patient does have lower blood pressure readings however this blood pressure was concerning in the 70s.  Patient did have some bleeding but did not suspect it was hemorrhagic.  Also thought to be septic due to possible UTI/pneumonia and being treated for antibiotics.  Also concerns for cardiogenic with an NSTEMI and being on nitro.  He was treated appropriately with antibiotics for his UTI/pneumonia and was also treated for NSTEMI.  Intermittently requiring pressor support with sedation after he was put on the ventilator.  Now on trach collar.  Hypotension has now resolved and he is doing well.

## 2023-03-26 NOTE — PLAN OF CARE
Pt free from fall/injury, on bedrest, turns independently. No s/s of infection noted, VSS, afebrile. Pt shows frustration sometimes r/t inability of staff to understand, has notebook at bedside which is helping with this.

## 2023-03-26 NOTE — HOSPITAL COURSE
73y M w/ SCC of the tongue s/p glossectomy and tracheostomy/PEG, CAD, home O2 dependent presented with hemoptysis. He had a prolonged hospital course (43 days in total) with numerous complications. He was seen by ENT, cardiology, and pulmonology/critical care. His major hospital issues were:    Hemoptysis: presenting issue which recurred during the hospitalization. ENT scope without source. Resolved after inflation of trach cuff, possibly tamponading bleeding source.    Pneumonia: Received multiple courses of antibiotics for pneumonia over the course of admission, including stenotrophomonas and pseudomonas. Had significantly increased O2 requirements during hospitalization, but was weaned to 5L by discharge.    NSTEMI: found to have NSTEMI and received stents to LAD and LCx    Retroperitoneal Hematoma: Found after LHC but on opposite side from procedure. Managed conservatively    Pleural effusion: Chronic, tapped by pulmonology with borderline transudate and otherwise unremarkable studies    Pneumothorax: post-procedural PTX likely due to trapped lung. Pt refused chest tube. Improved on serial imaging    Hypercapnic respiratory failure: hypercapnia required ventilator x 48 hours, resolved with holding sedating medications    By discharge he was back to his home 5L and requesting to go home. RT provided education to pt/wife on trach care, which they were already brionna with.PT/OT recommended home health. Will follow closely with ENT, cardiology, and PCP for further care.

## 2023-03-27 LAB
ABO + RH BLD: NORMAL
ALLENS TEST: ABNORMAL
ANION GAP SERPL CALC-SCNC: 7 MMOL/L (ref 8–16)
APTT PPP: 32.8 SEC (ref 21–32)
APTT PPP: 45.8 SEC (ref 21–32)
BASOPHILS # BLD AUTO: 0.02 K/UL (ref 0–0.2)
BASOPHILS NFR BLD: 0.2 % (ref 0–1.9)
BLD GP AB SCN CELLS X3 SERPL QL: NORMAL
BLD PROD TYP BPU: NORMAL
BLD PROD TYP BPU: NORMAL
BLOOD UNIT EXPIRATION DATE: NORMAL
BLOOD UNIT EXPIRATION DATE: NORMAL
BLOOD UNIT TYPE CODE: 5100
BLOOD UNIT TYPE CODE: 5100
BLOOD UNIT TYPE: NORMAL
BLOOD UNIT TYPE: NORMAL
BUN SERPL-MCNC: 38 MG/DL (ref 8–23)
CALCIUM SERPL-MCNC: 8.6 MG/DL (ref 8.7–10.5)
CHLORIDE SERPL-SCNC: 93 MMOL/L (ref 95–110)
CO2 SERPL-SCNC: 41 MMOL/L (ref 23–29)
CODING SYSTEM: NORMAL
CODING SYSTEM: NORMAL
CREAT SERPL-MCNC: 0.9 MG/DL (ref 0.5–1.4)
CROSSMATCH INTERPRETATION: NORMAL
CROSSMATCH INTERPRETATION: NORMAL
DELSYS: ABNORMAL
DIFFERENTIAL METHOD: ABNORMAL
DISPENSE STATUS: NORMAL
DISPENSE STATUS: NORMAL
EOSINOPHIL # BLD AUTO: 0.1 K/UL (ref 0–0.5)
EOSINOPHIL NFR BLD: 0.7 % (ref 0–8)
ERYTHROCYTE [DISTWIDTH] IN BLOOD BY AUTOMATED COUNT: 14.6 % (ref 11.5–14.5)
EST. GFR  (NO RACE VARIABLE): >60 ML/MIN/1.73 M^2
FIO2: 40
FLOW: 10
FLOW: 10
FLOW: 8
GLUCOSE SERPL-MCNC: 139 MG/DL (ref 70–110)
HCO3 UR-SCNC: 41.3 MMOL/L (ref 24–28)
HCT VFR BLD AUTO: 26.8 % (ref 40–54)
HGB BLD-MCNC: 5.4 G/DL (ref 14–18)
HGB BLD-MCNC: 8.4 G/DL (ref 14–18)
IMM GRANULOCYTES # BLD AUTO: 0.07 K/UL (ref 0–0.04)
IMM GRANULOCYTES NFR BLD AUTO: 0.6 % (ref 0–0.5)
INR PPP: 1.2 (ref 0.8–1.2)
LYMPHOCYTES # BLD AUTO: 0.7 K/UL (ref 1–4.8)
LYMPHOCYTES NFR BLD: 5.8 % (ref 18–48)
MCH RBC QN AUTO: 29.1 PG (ref 27–31)
MCHC RBC AUTO-ENTMCNC: 31.3 G/DL (ref 32–36)
MCV RBC AUTO: 93 FL (ref 82–98)
MODE: ABNORMAL
MONOCYTES # BLD AUTO: 1 K/UL (ref 0.3–1)
MONOCYTES NFR BLD: 8.7 % (ref 4–15)
NEUTROPHILS # BLD AUTO: 9.9 K/UL (ref 1.8–7.7)
NEUTROPHILS NFR BLD: 84 % (ref 38–73)
NRBC BLD-RTO: 0 /100 WBC
NUM UNITS TRANS PACKED RBC: NORMAL
NUM UNITS TRANS PACKED RBC: NORMAL
PCO2 BLDA: 58 MMHG (ref 35–45)
PH SMN: 7.46 [PH] (ref 7.35–7.45)
PLATELET # BLD AUTO: 301 K/UL (ref 150–450)
PMV BLD AUTO: 9.1 FL (ref 9.2–12.9)
PO2 BLDA: 95 MMHG (ref 80–100)
POC ACTIVATED CLOTTING TIME K: 167 SEC (ref 74–137)
POC ACTIVATED CLOTTING TIME K: 197 SEC (ref 74–137)
POC ACTIVATED CLOTTING TIME K: 239 SEC (ref 74–137)
POC ACTIVATED CLOTTING TIME K: 287 SEC (ref 74–137)
POC ACTIVATED CLOTTING TIME K: 317 SEC (ref 74–137)
POC BE: 15 MMOL/L
POC SATURATED O2: 98 % (ref 95–100)
POC TCO2: 43 MMOL/L (ref 23–27)
POCT GLUCOSE: 227 MG/DL (ref 70–110)
POCT GLUCOSE: 289 MG/DL (ref 70–110)
POTASSIUM SERPL-SCNC: 3.4 MMOL/L (ref 3.5–5.1)
PROTHROMBIN TIME: 13 SEC (ref 9–12.5)
RBC # BLD AUTO: 2.89 M/UL (ref 4.6–6.2)
SAMPLE: ABNORMAL
SITE: ABNORMAL
SODIUM SERPL-SCNC: 141 MMOL/L (ref 136–145)
SP02: 100
SP02: 100
SPECIMEN OUTDATE: NORMAL
WBC # BLD AUTO: 11.8 K/UL (ref 3.9–12.7)

## 2023-03-27 PROCEDURE — P9021 RED BLOOD CELLS UNIT: HCPCS | Performed by: INTERNAL MEDICINE

## 2023-03-27 PROCEDURE — 75625 PR  ANGIO AORTOGRAM ABD SERIAL: ICD-10-PCS | Mod: 26,,, | Performed by: INTERNAL MEDICINE

## 2023-03-27 PROCEDURE — 25000003 PHARM REV CODE 250: Performed by: INTERNAL MEDICINE

## 2023-03-27 PROCEDURE — A4216 STERILE WATER/SALINE, 10 ML: HCPCS | Performed by: INTERNAL MEDICINE

## 2023-03-27 PROCEDURE — 92928 PRQ TCAT PLMT NTRAC ST 1 LES: CPT | Mod: LD,LC,, | Performed by: INTERNAL MEDICINE

## 2023-03-27 PROCEDURE — 80048 BASIC METABOLIC PNL TOTAL CA: CPT | Performed by: INTERNAL MEDICINE

## 2023-03-27 PROCEDURE — C1894 INTRO/SHEATH, NON-LASER: HCPCS | Performed by: INTERNAL MEDICINE

## 2023-03-27 PROCEDURE — 99497 ADVNCD CARE PLAN 30 MIN: CPT | Mod: ,,, | Performed by: REGISTERED NURSE

## 2023-03-27 PROCEDURE — 99233 PR SUBSEQUENT HOSPITAL CARE,LEVL III: ICD-10-PCS | Mod: ,,, | Performed by: REGISTERED NURSE

## 2023-03-27 PROCEDURE — 36200 PLACE CATHETER IN AORTA: CPT | Mod: 59,,, | Performed by: INTERNAL MEDICINE

## 2023-03-27 PROCEDURE — 92928 PR STENT: ICD-10-PCS | Mod: LD,LC,, | Performed by: INTERNAL MEDICINE

## 2023-03-27 PROCEDURE — 99497 PR ADVNCD CARE PLAN 30 MIN: ICD-10-PCS | Mod: ,,, | Performed by: REGISTERED NURSE

## 2023-03-27 PROCEDURE — 36600 WITHDRAWAL OF ARTERIAL BLOOD: CPT

## 2023-03-27 PROCEDURE — 99900035 HC TECH TIME PER 15 MIN (STAT)

## 2023-03-27 PROCEDURE — C9600 PERC DRUG-EL COR STENT SING: HCPCS | Mod: LD,LC | Performed by: INTERNAL MEDICINE

## 2023-03-27 PROCEDURE — 25000003 PHARM REV CODE 250: Performed by: STUDENT IN AN ORGANIZED HEALTH CARE EDUCATION/TRAINING PROGRAM

## 2023-03-27 PROCEDURE — 25000242 PHARM REV CODE 250 ALT 637 W/ HCPCS: Performed by: HOSPITALIST

## 2023-03-27 PROCEDURE — 63600175 PHARM REV CODE 636 W HCPCS: Performed by: INTERNAL MEDICINE

## 2023-03-27 PROCEDURE — C1769 GUIDE WIRE: HCPCS | Performed by: INTERNAL MEDICINE

## 2023-03-27 PROCEDURE — C1874 STENT, COATED/COV W/DEL SYS: HCPCS | Performed by: INTERNAL MEDICINE

## 2023-03-27 PROCEDURE — 25000003 PHARM REV CODE 250: Performed by: HOSPITALIST

## 2023-03-27 PROCEDURE — A4216 STERILE WATER/SALINE, 10 ML: HCPCS | Performed by: HOSPITALIST

## 2023-03-27 PROCEDURE — 75625 CONTRAST EXAM ABDOMINL AORTA: CPT | Performed by: INTERNAL MEDICINE

## 2023-03-27 PROCEDURE — 25000003 PHARM REV CODE 250: Performed by: REGISTERED NURSE

## 2023-03-27 PROCEDURE — 99152 MOD SED SAME PHYS/QHP 5/>YRS: CPT | Mod: ,,, | Performed by: INTERNAL MEDICINE

## 2023-03-27 PROCEDURE — 85730 THROMBOPLASTIN TIME PARTIAL: CPT | Mod: 91 | Performed by: INTERNAL MEDICINE

## 2023-03-27 PROCEDURE — 27200966 HC CLOSED SUCTION SYSTEM

## 2023-03-27 PROCEDURE — 36200 PLACE CATHETER IN AORTA: CPT | Mod: 59 | Performed by: INTERNAL MEDICINE

## 2023-03-27 PROCEDURE — 99900026 HC AIRWAY MAINTENANCE (STAT)

## 2023-03-27 PROCEDURE — 85018 HEMOGLOBIN: CPT | Performed by: INTERNAL MEDICINE

## 2023-03-27 PROCEDURE — 27201423 OPTIME MED/SURG SUP & DEVICES STERILE SUPPLY: Performed by: INTERNAL MEDICINE

## 2023-03-27 PROCEDURE — 86900 BLOOD TYPING SEROLOGIC ABO: CPT | Performed by: INTERNAL MEDICINE

## 2023-03-27 PROCEDURE — 99152 PR MOD CONSCIOUS SEDATION, SAME PHYS, 5+ YRS, FIRST 15 MIN: ICD-10-PCS | Mod: ,,, | Performed by: INTERNAL MEDICINE

## 2023-03-27 PROCEDURE — C1753 CATH, INTRAVAS ULTRASOUND: HCPCS | Performed by: INTERNAL MEDICINE

## 2023-03-27 PROCEDURE — 86920 COMPATIBILITY TEST SPIN: CPT | Performed by: STUDENT IN AN ORGANIZED HEALTH CARE EDUCATION/TRAINING PROGRAM

## 2023-03-27 PROCEDURE — 94761 N-INVAS EAR/PLS OXIMETRY MLT: CPT

## 2023-03-27 PROCEDURE — C1887 CATHETER, GUIDING: HCPCS | Performed by: INTERNAL MEDICINE

## 2023-03-27 PROCEDURE — 25500020 PHARM REV CODE 255: Performed by: INTERNAL MEDICINE

## 2023-03-27 PROCEDURE — 85730 THROMBOPLASTIN TIME PARTIAL: CPT | Performed by: INTERNAL MEDICINE

## 2023-03-27 PROCEDURE — 99153 MOD SED SAME PHYS/QHP EA: CPT | Performed by: INTERNAL MEDICINE

## 2023-03-27 PROCEDURE — 36430 TRANSFUSION BLD/BLD COMPNT: CPT

## 2023-03-27 PROCEDURE — 36200 PR PLACE CATH AORTA: ICD-10-PCS | Mod: 59,,, | Performed by: INTERNAL MEDICINE

## 2023-03-27 PROCEDURE — C1725 CATH, TRANSLUMIN NON-LASER: HCPCS | Performed by: INTERNAL MEDICINE

## 2023-03-27 PROCEDURE — 85610 PROTHROMBIN TIME: CPT | Performed by: INTERNAL MEDICINE

## 2023-03-27 PROCEDURE — 85025 COMPLETE CBC W/AUTO DIFF WBC: CPT | Performed by: HOSPITALIST

## 2023-03-27 PROCEDURE — 99233 SBSQ HOSP IP/OBS HIGH 50: CPT | Mod: ,,, | Performed by: REGISTERED NURSE

## 2023-03-27 PROCEDURE — 99152 MOD SED SAME PHYS/QHP 5/>YRS: CPT | Performed by: INTERNAL MEDICINE

## 2023-03-27 PROCEDURE — 75625 CONTRAST EXAM ABDOMINL AORTA: CPT | Mod: 26,,, | Performed by: INTERNAL MEDICINE

## 2023-03-27 PROCEDURE — 20000000 HC ICU ROOM

## 2023-03-27 PROCEDURE — 27000221 HC OXYGEN, UP TO 24 HOURS

## 2023-03-27 PROCEDURE — 85347 COAGULATION TIME ACTIVATED: CPT

## 2023-03-27 PROCEDURE — 86920 COMPATIBILITY TEST SPIN: CPT | Performed by: INTERNAL MEDICINE

## 2023-03-27 PROCEDURE — 94640 AIRWAY INHALATION TREATMENT: CPT

## 2023-03-27 PROCEDURE — 82803 BLOOD GASES ANY COMBINATION: CPT

## 2023-03-27 DEVICE — EVEROLIMUS-ELUTING PLATINUM CHROMIUM CORONARY STENT SYSTEM
Type: IMPLANTABLE DEVICE | Site: HEART | Status: FUNCTIONAL
Brand: SYNERGY™ XD

## 2023-03-27 RX ORDER — NITROGLYCERIN 0.4 MG/1
0.4 TABLET SUBLINGUAL EVERY 5 MIN PRN
Status: DISCONTINUED | OUTPATIENT
Start: 2023-03-27 | End: 2023-04-30 | Stop reason: HOSPADM

## 2023-03-27 RX ORDER — HEPARIN SODIUM 1000 [USP'U]/ML
INJECTION, SOLUTION INTRAVENOUS; SUBCUTANEOUS
Status: DISCONTINUED | OUTPATIENT
Start: 2023-03-27 | End: 2023-03-27 | Stop reason: HOSPADM

## 2023-03-27 RX ORDER — SODIUM CHLORIDE 9 MG/ML
INJECTION, SOLUTION INTRAMUSCULAR; INTRAVENOUS; SUBCUTANEOUS
Status: DISCONTINUED | OUTPATIENT
Start: 2023-03-27 | End: 2023-03-27 | Stop reason: HOSPADM

## 2023-03-27 RX ORDER — LIDOCAINE HYDROCHLORIDE 10 MG/ML
INJECTION, SOLUTION EPIDURAL; INFILTRATION; INTRACAUDAL; PERINEURAL
Status: DISCONTINUED | OUTPATIENT
Start: 2023-03-27 | End: 2023-03-27 | Stop reason: HOSPADM

## 2023-03-27 RX ORDER — FENTANYL CITRATE 50 UG/ML
INJECTION, SOLUTION INTRAMUSCULAR; INTRAVENOUS
Status: DISCONTINUED | OUTPATIENT
Start: 2023-03-27 | End: 2023-03-27 | Stop reason: HOSPADM

## 2023-03-27 RX ORDER — ATROPINE SULFATE 0.1 MG/ML
0.5 INJECTION INTRAVENOUS
Status: DISCONTINUED | OUTPATIENT
Start: 2023-03-27 | End: 2023-04-30 | Stop reason: HOSPADM

## 2023-03-27 RX ORDER — POTASSIUM CHLORIDE 20 MEQ/1
40 TABLET, EXTENDED RELEASE ORAL ONCE
Status: COMPLETED | OUTPATIENT
Start: 2023-03-27 | End: 2023-03-27

## 2023-03-27 RX ORDER — ACETAMINOPHEN 325 MG/1
650 TABLET ORAL EVERY 4 HOURS PRN
Status: DISCONTINUED | OUTPATIENT
Start: 2023-03-27 | End: 2023-04-10

## 2023-03-27 RX ORDER — ONDANSETRON 8 MG/1
8 TABLET, ORALLY DISINTEGRATING ORAL EVERY 8 HOURS PRN
Status: DISCONTINUED | OUTPATIENT
Start: 2023-03-27 | End: 2023-04-30 | Stop reason: HOSPADM

## 2023-03-27 RX ORDER — VERAPAMIL HYDROCHLORIDE 2.5 MG/ML
INJECTION, SOLUTION INTRAVENOUS
Status: DISCONTINUED | OUTPATIENT
Start: 2023-03-27 | End: 2023-03-27 | Stop reason: HOSPADM

## 2023-03-27 RX ORDER — HEPARIN SODIUM 200 [USP'U]/100ML
INJECTION, SOLUTION INTRAVENOUS
Status: DISCONTINUED | OUTPATIENT
Start: 2023-03-27 | End: 2023-03-28

## 2023-03-27 RX ORDER — HYDROCODONE BITARTRATE AND ACETAMINOPHEN 5; 325 MG/1; MG/1
1 TABLET ORAL EVERY 4 HOURS PRN
Status: COMPLETED | OUTPATIENT
Start: 2023-03-27 | End: 2023-03-31

## 2023-03-27 RX ORDER — HYDROCODONE BITARTRATE AND ACETAMINOPHEN 500; 5 MG/1; MG/1
TABLET ORAL
Status: DISCONTINUED | OUTPATIENT
Start: 2023-03-27 | End: 2023-03-28

## 2023-03-27 RX ORDER — NITROGLYCERIN 20 MG/100ML
INJECTION INTRAVENOUS
Status: DISCONTINUED | OUTPATIENT
Start: 2023-03-27 | End: 2023-04-09

## 2023-03-27 RX ORDER — MIDAZOLAM HYDROCHLORIDE 1 MG/ML
INJECTION, SOLUTION INTRAMUSCULAR; INTRAVENOUS
Status: DISCONTINUED | OUTPATIENT
Start: 2023-03-27 | End: 2023-03-27 | Stop reason: HOSPADM

## 2023-03-27 RX ORDER — HYDROCODONE BITARTRATE AND ACETAMINOPHEN 500; 5 MG/1; MG/1
TABLET ORAL
Status: DISCONTINUED | OUTPATIENT
Start: 2023-03-28 | End: 2023-03-29

## 2023-03-27 RX ORDER — NOREPINEPHRINE BITARTRATE/D5W 8 MG/250ML
0-3 PLASTIC BAG, INJECTION (ML) INTRAVENOUS CONTINUOUS
Status: DISCONTINUED | OUTPATIENT
Start: 2023-03-27 | End: 2023-03-27

## 2023-03-27 RX ORDER — NITROGLYCERIN 20 MG/100ML
INJECTION INTRAVENOUS
Status: DISCONTINUED | OUTPATIENT
Start: 2023-03-27 | End: 2023-03-27 | Stop reason: HOSPADM

## 2023-03-27 RX ORDER — MORPHINE SULFATE 4 MG/ML
2 INJECTION, SOLUTION INTRAMUSCULAR; INTRAVENOUS EVERY 10 MIN PRN
Status: DISCONTINUED | OUTPATIENT
Start: 2023-03-27 | End: 2023-04-10

## 2023-03-27 RX ORDER — ENOXAPARIN SODIUM 100 MG/ML
40 INJECTION SUBCUTANEOUS EVERY 24 HOURS
Status: DISCONTINUED | OUTPATIENT
Start: 2023-03-27 | End: 2023-03-28

## 2023-03-27 RX ADMIN — ENOXAPARIN SODIUM 40 MG: 40 INJECTION SUBCUTANEOUS at 05:03

## 2023-03-27 RX ADMIN — IPRATROPIUM BROMIDE AND ALBUTEROL SULFATE 3 ML: 2.5; .5 SOLUTION RESPIRATORY (INHALATION) at 10:03

## 2023-03-27 RX ADMIN — HYDROXYZINE HYDROCHLORIDE 25 MG: 25 TABLET ORAL at 08:03

## 2023-03-27 RX ADMIN — MUPIROCIN: 20 OINTMENT TOPICAL at 09:03

## 2023-03-27 RX ADMIN — Medication 10 ML: at 06:03

## 2023-03-27 RX ADMIN — MUPIROCIN: 20 OINTMENT TOPICAL at 08:03

## 2023-03-27 RX ADMIN — SODIUM CHLORIDE 1200 ML: 9 INJECTION, SOLUTION INTRAVENOUS at 04:03

## 2023-03-27 RX ADMIN — NOREPINEPHRINE BITARTRATE 0.2 MCG/KG/MIN: 1 INJECTION, SOLUTION, CONCENTRATE INTRAVENOUS at 10:03

## 2023-03-27 RX ADMIN — GLYCOPYRROLATE 1 MG: 1 TABLET ORAL at 09:03

## 2023-03-27 RX ADMIN — LIDOCAINE 5% 2 PATCH: 700 PATCH TOPICAL at 03:03

## 2023-03-27 RX ADMIN — FENTANYL CITRATE 50 MCG: 50 INJECTION INTRAMUSCULAR; INTRAVENOUS at 07:03

## 2023-03-27 RX ADMIN — HEPARIN SODIUM 19 UNITS/KG/HR: 10000 INJECTION, SOLUTION INTRAVENOUS at 05:03

## 2023-03-27 RX ADMIN — CLOPIDOGREL BISULFATE 75 MG: 75 TABLET ORAL at 09:03

## 2023-03-27 RX ADMIN — SODIUM CHLORIDE, POTASSIUM CHLORIDE, SODIUM LACTATE AND CALCIUM CHLORIDE 1000 ML: 600; 310; 30; 20 INJECTION, SOLUTION INTRAVENOUS at 08:03

## 2023-03-27 RX ADMIN — POTASSIUM CHLORIDE 40 MEQ: 1500 TABLET, EXTENDED RELEASE ORAL at 09:03

## 2023-03-27 RX ADMIN — SODIUM CHLORIDE: 9 INJECTION, SOLUTION INTRAVENOUS at 09:03

## 2023-03-27 RX ADMIN — FERROUS SULFATE TAB 325 MG (65 MG ELEMENTAL FE) 1 EACH: 325 (65 FE) TAB at 09:03

## 2023-03-27 RX ADMIN — FENTANYL CITRATE 50 MCG: 50 INJECTION INTRAMUSCULAR; INTRAVENOUS at 03:03

## 2023-03-27 RX ADMIN — FENTANYL CITRATE 50 MCG: 50 INJECTION INTRAMUSCULAR; INTRAVENOUS at 01:03

## 2023-03-27 RX ADMIN — GLYCOPYRROLATE 1 MG: 1 TABLET ORAL at 08:03

## 2023-03-27 RX ADMIN — ATORVASTATIN CALCIUM 80 MG: 40 TABLET, FILM COATED ORAL at 09:03

## 2023-03-27 RX ADMIN — HYDROXYZINE HYDROCHLORIDE 25 MG: 25 TABLET ORAL at 09:03

## 2023-03-27 RX ADMIN — Medication 0.02 MCG/KG/MIN: at 03:03

## 2023-03-27 RX ADMIN — ASPIRIN 81 MG CHEWABLE TABLET 81 MG: 81 TABLET CHEWABLE at 09:03

## 2023-03-27 RX ADMIN — PHYTONADIONE 10 MG: 10 INJECTION, EMULSION INTRAMUSCULAR; INTRAVENOUS; SUBCUTANEOUS at 11:03

## 2023-03-27 RX ADMIN — Medication 0.2 MCG/KG/MIN: at 08:03

## 2023-03-27 NOTE — PROGRESS NOTES
[facility] Medicine  Progress Note    Patient Name: Fareed Richard Jr.  MRN: [id number]  Patient Class: IP- Inpatient   Admission Date: 3/18/2023  Length of Stay: 8 days  Attending Physician: Luiz Patel MD  Primary Care Provider: Kodi Tubbs MD        Subjective:     Principal Problem:Hypotension        HPI:  Fareed Richard Jr. 73 y.o. 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  Per his wife 4 days ago he began to have pink tinged sputum via his trach.  He was seen by his ENT 2 days ago with a scope exam and a trach exchange without evidence of bleeding.  This morning at 3:00 a.m. he developed bright red blood via trach which resolved without intervention.  It is since not recurred.  He takes a daily aspirin.  He receives all medications via G-tube.  His tube feeding regimen consists of 6 cans of Isosource daily with 120 cc free water boluses.  He denies fever chest pain shortness of breath nausea vomiting abdominal pain leg swelling syncope dizziness dysuria melena hematuria hematemesis.    In the ED, hemoglobin 7.6 INR 1.0 BUN 50 creatinine 0.7  troponin negative BRCA negative O positive type and screen chest x-ray without detrimental change hypotensive to 85/54.      Overview/Hospital Course:  72 yo M w squamous CA of tongue s/p glossectomy and reconstructive flap with trach, CAD, chronic hypoxic respiratory failure (on 5L at home) and with peg presented for eval of hemoptysis 2 days after trach change in clinic.  Transferred to ICU 3/19 when markedly hypotensive.  Unclear if due to hemorrhage, cardiogenic or septic shock.  Did require PRBC and TXA nebs but didn't seem like sufficient bleeding to cause shock.  Bleeding has stopped and ENT is on board and following.  CTA shows either significant mucus plugging or bibasilar pneumonia - pulmonology favored  pneumonia.  Also has urine culture growing Enterococcus.  He is on broad spectrum antibiotics. Troponin checked due to hypotension and it was 1.17.  Cards consulted and feel it's demand due to ischemia and hypotension and recommend outpatient stress. AM cortisol was low so ordered cosyntropin stim test which shows an adequate adrenal response to r/o adrenal insufficiency.  Developed severe shoulder pain and hypoxia, increasing troponin and pulmonary edema on CXR - shoulder pain likely anginal equivalent. Discussed with Cardiology, started heparin and nitro drip. Required nitro drip for chest pain. Tolerating heparin drip without evidence of further tracheostomy bleeding. Further discussions with Cardiology, and brought patient for [facility]/angiogram on 3/23 which showed distal LM 30%, mild LAD 90% bifurcation lesion with D1, Cx mid 80%, RCA moderate diffuse disease with long 70% and some left to right collateral. All vessels heavily calcified. Not a candidate for CABG but plan for staged PCI. Continues on heparin drip, asa/plavix and tolerating. Off pressor support/nitro drip. No further bleeding and Hb remains stable. Had episodes of hypoxia/respiratory distress after vomiting and was intermittently placed on ventilator -  now back to trach collar close to home O2 requirements. Plan for angiogram/PCI on 3/27 with Dr. Bhatt.      Interval History:  having left side/hip pain this mroning and received fentanyl/ice packs which seemed to improve. No further bleeding. Plan for PCI today    Objective:     Vital Signs (Most Recent):  Temp: 97.7 °F (36.5 °C) (03/27/23 0700)  Pulse: 88 (03/27/23 1030)  Resp: 19 (03/27/23 1030)  BP: 96/62 (03/27/23 1030)  SpO2: 100 % (03/27/23 1030)   Vital Signs (24h Range):  Temp:  [97.7 °F (36.5 °C)-98.1 °F (36.7 °C)] 97.7 °F (36.5 °C)  Pulse:  [67-96] 88  Resp:  [11-44] 19  SpO2:  [87 %-100 %] 100 %  BP: ()/(45-76) 96/62     Weight: 68 kg (150 lb)  Body mass index is 22.81  kg/m².    Intake/Output Summary (Last 24 hours) at 3/27/2023 1206  Last data filed at 3/27/2023 0930  Gross per 24 hour   Intake 1710.75 ml   Output 1700 ml   Net 10.75 ml        Physical Exam  Vitals and nursing note reviewed.   Constitutional:       General: He is not in acute distress.     Appearance: He is well-developed. He is ill-appearing. He is not diaphoretic.   HENT:      Head: Normocephalic and atraumatic.   Eyes:      General:         Right eye: No discharge.         Left eye: No discharge.      Conjunctiva/sclera: Conjunctivae normal.   Neck:      Thyroid: No thyromegaly.      Comments: Trach in place.    Cardiovascular:      Rate and Rhythm: Normal rate and regular rhythm.      Heart sounds: No murmur heard.  Pulmonary:      Effort: Pulmonary effort is normal. No respiratory distress.      Breath sounds: Normal breath sounds.      Comments: Trach collar 10 L  Abdominal:      General: Bowel sounds are normal. There is no distension.      Palpations: Abdomen is soft. There is no mass.      Tenderness: There is no abdominal tenderness.      Comments: Peg tube in place   Musculoskeletal:         General: No deformity.   Skin:     General: Skin is warm and dry.   Neurological:      Mental Status: He is alert and oriented to person, place, and time.      Comments: Alert and awake, communicates (difficulty speaking with trach) at baseline   Psychiatric:      Comments: Anxious       Significant Labs: All pertinent labs within the past 24 hours have been reviewed.    Significant Imaging: I have reviewed all pertinent imaging results/findings within the past 24 hours.    Review of Systems      Assessment/Plan:      * Hypotension  Presented with hypotension and bleeding from tracheostomy/hemoptysis.  Patient does have lower blood pressure readings however this blood pressure was concerning in the 70s.  Patient did have some bleeding but did not suspect it was hemorrhagic.  Also thought to be septic due to  "possible UTI/pneumonia and being treated for antibiotics.  Also concerns for cardiogenic with an NSTEMI and being on nitro.  He was treated appropriately with antibiotics for his UTI/pneumonia and was also treated for NSTEMI.  Intermittently requiring pressor support with sedation after he was put on the ventilator.  Now on trach collar.  Hypotension has now resolved and he is doing well.      NSTEMI (non-ST elevated myocardial infarction)  -Denies chest pain but on 3/19 did have unexplained hypotension with severe back/shoulder pain  -Troponin on admit normal, but on 3/19 bumped to 1.7 and then 2.2 on 3/20.  -No jamil ischemic change on EKG  -Echo showed EF 60%, inferobasal hypokinesis, indeterminate diastolic function  -Cardiology consulted and input appreciated  -3/19 - Cardiology suspects this is due to demand ischemia associated with his anemia and hypotension.  Further, he is a poor candidate for angiogram given his hemoptysis and inability to treat with blood thinning medications.  Recommended outpatient stress testing  -Holding aspirin given hemoptysis and metoprolol given hypotension  -Continue statin.  Resume BB as able. On nitro drip for chest pain. - having difficulty weaning off. He is currently cleared for DAPT as no further bleeding issues at this time. Updated Cardiology.   -Patient underwent LHC/angiogram on 2/23 - findings below.  "3/23/23 C - EDP 13, distal LM 30%, mild LAD 90% bifurcation lesion with D1, Cx mid 80%, RCA moderate diffuse disease with long 70% and some left to right collateral. All vessels heavily calcified     Reviewed with Dr Malone - poor candidate for CABG. Will discuss staged PCI of LAD/Cx if able to tolerate DAPT without further pulmonary bleeding"    - Currently on ASA/Plavix and low dose heparin infusion. Cardiology discussing intervention with patient and wife. Plans for LHC on Monday 3/27.        UTI (urinary tract infection)  -Urine culture growing Enterococcus > 100,000 " cfu/ml.  Sensitive to Vanc/Ampicillin  -Continue broad spectrum antibiotics for now and tailor based off results.  - has completed antibiotics.    Hemoptysis  -Treatment as above. This has resolved.    PEG (percutaneous endoscopic gastrostomy) status  -Continue medications via PEG. Does not take oral intake  -On isosource 1.5 6 days daily with 120cc free water flushes via wife  -Will continue home tube feedings, with nutrition consulted    Tracheostomy present  -Present on admit with bleeding / hemoptysis  -Treating with txa nebs and bleeding appears to have resolved  -ENT - appreciate recommendations  -Continue home glycopyrrolate and guaifensin  -Continue supplemental oxygen    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   -TSH is normal and he is not on treatment as outpatient    Acute respiratory failure with hypoxia  Patient with Hypoxic Respiratory failure which is Acute on chronic.  he is on home oxygen at 2 LPM. Supplemental oxygen was provided and noted- Oxygen Concentration (%):  [40] 40.   Signs/symptoms of respiratory failure include- tachypnea and increased work of breathing. Contributing diagnoses includes - Aspiration and Pneumonia Labs and images were reviewed. Patient Has not had a recent ABG. Will treat underlying causes and adjust management of respiratory failure as follows   - changed out trach and now placed to ventilator for positive pressure  - had secretions in trach with exchange. Appears to be more comfortable now.    Acute on chronic respiratory failure with hypoxia  -Patient with acute on chronic hypoxic respiratory failure  -At home is on 5L O2 via trach and O2 sats typically in high 80s low 90s  -Sats presently similar, but is having episodes of hemoptysis  -Acute etiology is likely due to pneumonia, mucus plugging and hemoptysis.  -Consulted pulm-critical care and input appreciated  -Discussed with [place] [facility] [place] and OK to keep at . Dr. French  evaluated.  -Continue antibiotics as above.  -Continue supplemental O2 via Trach and wean as able. Placed on mechanical ventilation on 3/21 for respiratory distress and increasing oxygen requirements.  Slowly weaning at this time.  Appears more comfortable today. Weaned off vent on 3/23. No need for ventilator at night.  - Currently on 10 liters trach collar    Acute blood loss anemia  -Hb on admit 7.6 and this morning 8.0  -Baseline Hb 8-9.  -Presented w intermittent hemoptysis via trach x 2 days. Had another episode on morning of 3/19/2023  -Seen at ENT 2 days prior to admit (Dr. Smalls) with exam without source. Trache exchanged at that time.   -Ferritin only 84 and Tsat 18% - consistent with iron deficiency  -Platelets and PT/INR are normal.  -Treated with TXA nebulizer and bleeding has stopped  -Started FeSO4 which he will need at discharge  -Repeat cbc in AM. Hb 7.7 but stable, no further evidence of bleeding. Again 7.1 but no further bleeding.   Stable at 8.2 - 8.3  - no further episodes of bleeding and tolerating heparin/plavix    Other hyperlipidemia  -Continue home statin    Primary hypertension  -BP typically in the 90s to low 100s systolic per chart review.   -Noted hypotension 3/19 which is addressed above.  -Holding home metoprolol and amlodipine    Coronary artery disease involving native coronary artery of native heart with unstable angina pectoris  -Denies chest pain but on 3/19 did have unexplained hypotension with severe back/shoulder pain  -Troponin on admit normal, but on 3/19 bumped to 1.7 and then 2.2 on 3/20.  -No jamil ischemic change on EKG  -Echo showed EF 60%, inferobasal hypokinesis, indeterminate diastolic function  -Cardiology consulted and input appreciated  -Cardiology suspects this is due to demand ischemia associated with his anemia and hypotension.  Further, he is a poor candidate for angiogram given his hemoptysis and inability to treat with blood thinning medications.   "Recommended outpatient stress testing  -Holding aspirin given hemoptysis and metoprolol given hypotension  -Continue statin.  Resume BB as able. On nitro drip for chest pain. - having difficulty weaning off. He is currently cleared for DAPT as no further bleeding issues at this time. Updated Cardiology.   -Patient underwent LHC/angiogram on 2/23 - findings below.  "3/23/23 LHC - EDP 13, distal LM 30%, mild LAD 90% bifurcation lesion with D1, Cx mid 80%, RCA moderate diffuse disease with long 70% and some left to right collateral. All vessels heavily calcified     Reviewed with Dr Malone - poor candidate for CABG. Will discuss staged PCI of LAD/Cx if able to tolerate DAPT without further pulmonary bleeding"    - Currently on ASA/Plavix and low dose heparin infusion. Cardiology discussing intervention with patient and wife today. No intervention planned for today.      Squamous cell cancer of tongue  -Diagnosed 12/15/21 via FNA.  Underwent total glossectomy, bilateral neck dissection, bilateral cervical facial flaps and anterolateral thigh flap reconstruction of glossectomy defect 1/4/22  -Completed adjuvant chemoradiation  -Followed by oncology and ENT outpt. No longer on chemo or radiation.   -Had trach changed by ENT 2 days prior to admit and scope at that time showed no signs of bleeding.   -Treatment as above.      VTE Risk Mitigation (From admission, onward)         Ordered     heparin (porcine) injection  As needed (PRN)         03/27/23 1153     heparin 25,000 units in dextrose 5% 250 ml (100 units/mL) infusion MINIMAL INTENSITY nomogram - [facility]  Continuous        Question Answer Comment   Heparin Infusion Adjustment (DO NOT MODIFY ANSWER) [email or web address]\[email or web address]\[email or web address] MINIMAL  INTENSITY nomogram for [facility] [id number]    Begin at (in units/kg/hr) 12        03/24/23 0950     IP VTE HIGH RISK PATIENT  Once         03/18/23 1620     Place sequential compression device  Until discontinued    "      03/18/23 1620     Place NIKITA hose  Until discontinued         03/18/23 1620                Discharge Planning   NISA:      Code Status: Full Code   Is the patient medically ready for discharge?:     Reason for patient still in hospital (select all that apply): Treatment  Discharge Plan A: Home with family                  Luiz Patel MD  Department of Hospital Medicine   [facility]

## 2023-03-27 NOTE — NURSING
Left groin dressing removed- cleaned the area with chlorhexidine wipes - left open to air - small scab noted -

## 2023-03-27 NOTE — SUBJECTIVE & OBJECTIVE
Past Medical History:   Diagnosis Date    Cancer     skin to Rt forearm and face    COPD (chronic obstructive pulmonary disease)     Hyperlipidemia     Hypertension     Pseudoaneurysm      Past Surgical History:   Procedure Laterality Date    ABDOMINAL SURGERY      stents placed in liver and large intestines, per patient    CAROTID STENT      COLONOSCOPY N/A 09/27/2022    Procedure: COLONOSCOPY;  Surgeon: Donnie Peterson MD;  Location: [facility] ENDO (2ND FLR);  Service: Endoscopy;  Laterality: N/A;    COLONOSCOPY N/A 09/30/2022    Procedure: COLONOSCOPY;  Surgeon: Joy Cabrera MD;  Location: [facility] ENDO (2ND FLR);  Service: Endoscopy;  Laterality: N/A;    CORONARY STENT PLACEMENT  01/2000    DISSECTION OF NECK Bilateral 01/04/2022    Procedure: DISSECTION, NECK;  Surgeon: Naeem Smalls MD;  [facility];  Service: ENT;  Laterality: Bilateral;    ESOPHAGOGASTRODUODENOSCOPY N/A 09/27/2022    Procedure: EGD (ESOPHAGOGASTRODUODENOSCOPY);  Surgeon: Donnie Peterson MD;  Location: [facility] ENDO (2ND FLR);  Service: Endoscopy;  Laterality: N/A;    ESOPHAGOGASTRODUODENOSCOPY N/A 09/30/2022    Procedure: EGD (ESOPHAGOGASTRODUODENOSCOPY);  Surgeon: Joy Cabrera MD;  Location: [facility] ENDO (2ND FLR);  Service: Endoscopy;  Laterality: N/A;    ESOPHAGOGASTRODUODENOSCOPY W/ PEG N/A 01/04/2022    Procedure: EGD, WITH PEG TUBE INSERTION;  Surgeon: Anthony Calabrese MD;  [facility];  Service: General;  Laterality: N/A;    EYE SURGERY      Cataract bilateral    femoral stents      bilateral    FLAP PROCEDURE N/A 01/03/2022    Procedure: CREATION, FREE FLAP;  Surgeon: Naeem Smalls MD;  [facility];  Service: ENT;  Laterality: N/A;    FLAP PROCEDURE Right 01/04/2022    Procedure: CREATION, FREE FLAP;  Surgeon: Stacey Conde MD;  [facility];  Service: ENT;  Laterality: Right;  Ischemic start 1203  Ischemic stop 1353    GLOSSECTOMY N/A 01/04/2022    Procedure: GLOSSECTOMY;   Surgeon: Naeem Smalls MD;  Location: [facility] OR [address] [place] [address];  Service: ENT;  Laterality: N/A;    RADICAL NECK DISSECTION Left 01/03/2022    Procedure: DISSECTION, NECK, RADICAL;  Surgeon: Naeem Smalls MD;  [facility];  Service: ENT;  Laterality: Left;    SKIN BIOPSY      SKIN CANCER EXCISION      TRACHEOTOMY N/A 01/04/2022    Procedure: TRACHEOTOMY;  Surgeon: Naeem Smalls MD;  Location: [facility] OR [address] [place] [address];  Service: ENT;  Laterality: N/A;    VASCULAR SURGERY       Review of patient's allergies indicates:   Allergen Reactions    Ativan [lorazepam] Anxiety       Medications:  Continuous Infusions:   dexmedeTOMIDine (Precedex) infusion (titrating) Stopped (03/24/23 1100)    heparin (porcine) 1,500 Units/hr (03/27/23 1209)    nitroGLYCERIN      NORepinephrine bitartrate-D5W 0.02 mcg/kg/min (03/27/23 1522)    sodium chloride 0.9%      sodium chloride 0.9% 250 mL/hr at 03/27/23 1217     Scheduled Meds:   aspirin  81 mg Oral Daily    atorvastatin  80 mg Per G Tube Daily    clopidogreL  75 mg Oral Daily    enoxaparin  40 mg Subcutaneous Daily    ferrous sulfate  1 tablet Per G Tube Daily    furosemide  40 mg Per G Tube BID    glycopyrrolate  1 mg Per G Tube TID    LIDOcaine  2 patch Transdermal Q24H    melatonin  9 mg Per G Tube Nightly    metoprolol tartrate  25 mg Per G Tube BID    mupirocin   Nasal BID    polyethylene glycol  17 g Oral BID    sodium chloride 0.9%  10 mL Intravenous Q6H     PRN Meds:sodium chloride, acetaminophen, acetaminophen, acetaminophen, albuterol-ipratropium, atropine, fentaNYL, guaiFENesin 100 mg/5 ml, heparin (porcine), HYDROcodone-acetaminophen, HYDROcodone-acetaminophen, hydrOXYzine HCL, insulin aspart U-100, morphine, naloxone, nitroGLYCERIN, nitroGLYCERIN, nitroGLYCERIN, ondansetron, ondansetron, oxyCODONE, sodium chloride 0.9%, sodium chloride 0.9%, Flushing PICC Protocol **AND** sodium chloride 0.9% **AND** sodium chloride 0.9%    Family History    None        Tobacco Use    Smoking status: Former     Packs/day: 2.00     Years: 40.00     Pack years: 80.00     Types: Cigarettes     Start date: 1963     Quit date: 2018     Years since quittin.9    Smokeless tobacco: Never    Tobacco comments:     3/3 ppd x 40 yrs. Currently 3-4 cigarettes daily .He is trying  to quit. Is using a Vapor cigarettes  2-3 x's a day.   Substance and Sexual Activity    Alcohol use: Not Currently    Drug use: No    Sexual activity: Not Currently       Objective:     Vital Signs (Most Recent):  Temp: 97.7 °F (36.5 °C) (23 0700)  Pulse: 102 (23 1600)  Resp: 18 (23 1600)  BP: (!) 83/52 (23 1600)  SpO2: 100 % (23 1600)   Vital Signs (24h Range):  Temp:  [97.7 °F (36.5 °C)-98.1 °F (36.7 °C)] 97.7 °F (36.5 °C)  Pulse:  [] 102  Resp:  [11-44] 18  SpO2:  [87 %-100 %] 100 %  BP: ()/(45-76) 83/52  Arterial Line BP: (60-89)/(28-40) 89/40     Weight: 68 kg (150 lb)  Body mass index is 22.81 kg/m².    Physical Exam  Constitutional:       General: He is sleeping. He is not in acute distress.     Appearance: He is ill-appearing.   HENT:      Head: Atraumatic.      Comments: History of facial/oral reconstruction   Pulmonary:      Effort: Pulmonary effort is normal.      Comments: Trach  Abdominal:      Comments: PEG   Musculoskeletal:      Right lower leg: No edema.      Left lower leg: No edema.   Neurological:      Mental Status: He is oriented to person, place, and time. He is lethargic.      Comments: Sleeping; arousable/follows commands    Psychiatric:         Mood and Affect: Mood is anxious.      Comments: Frustration with difficulties with communication        Advance Care Planning   Advance Directives:   Living Will: No    LaPOST: No    Do Not Resuscitate Status: No    Medical Power of : No      Decision Making:  Patient answered questions and Family answered questions  Goals of Care: What is most important right now is to focus on  spending time at home, remaining as independent as possible, symptom/pain control, curative/life-prolongation (regardless of treatment burdens). Accordingly, we have decided that the best plan to meet the patient's goals includes continuing with treatment.       Significant Labs: All pertinent labs within the past 24 hours have been reviewed.  CBC:   Recent Labs   Lab 03/27/23  0355   WBC 11.80   HGB 8.4*   HCT 26.8*   MCV 93          BMP:  Recent Labs   Lab 03/27/23  0355   *      K 3.4*   CL 93*   CO2 41*   BUN 38*   CREATININE 0.9   CALCIUM 8.6*       LFT:  Lab Results   Component Value Date    AST 22 03/19/2023    ALKPHOS 65 03/19/2023    BILITOT 0.4 03/19/2023     Albumin:   Albumin   Date Value Ref Range Status   03/23/2023 2.2 (L) 3.5 - 5.2 g/dL Final     Protein:   Total Protein   Date Value Ref Range Status   03/19/2023 5.5 (L) 6.0 - 8.4 g/dL Final     Lactic acid:   Lab Results   Component Value Date    LACTATE 1.8 03/19/2023    LACTATE 0.8 05/14/2022       Significant Imaging: I have reviewed all pertinent imaging results/findings within the past 24 hours.

## 2023-03-27 NOTE — NURSING
Right femoral sheath pulled - manual pressure held for 20 minutes -dry dressing applied - doppler pulses - instructed to lie flat and ice pack applied - right radial band with 3cc removed at 15 minute interval- no bleeding - patient with back pain - turning and repositioning - ice packs placed - patient appears very uncomfortable - remains on levo - once b/p with stable map will administer pain meds

## 2023-03-27 NOTE — NURSING
Patient still with hypotension - bolus infusing - some complaints of dizziness - continue to titrate Levo - plans to rechek ACT

## 2023-03-27 NOTE — ASSESSMENT & PLAN NOTE
- trach; now off vent   - respiratory status greatly affected during periods of anxiety   - noted; management per hospital primary and PCCM

## 2023-03-27 NOTE — EICU
Called about bicarb 41 up from 32 on chem, creatinine 0.9 Pt is on 40% TC sats. 95 % RR 24.nl creatinine. ABG ordered.  D/w RN

## 2023-03-27 NOTE — PROGRESS NOTES
[facility] - Intensive Care  Palliative Medicine  Progress Note    Patient Name: Fareed Richard Jr.  MRN: [id number]  Admission Date: 3/18/2023  Hospital Length of Stay: 8 days  Code Status: Full Code   Attending Provider: Luiz Patel MD  Consulting Provider: Trupti Beck NP  Primary Care Physician: Kodi Tubbs MD  Principal Problem:Hypotension    Patient information was obtained from patient, relative(s) and primary team.      Assessment/Plan:   Advance Care Planning   Palliative Encounter  Date: 03/27/2023    Psychiatric  Anxiety  - anxiety which worsens respiratory symptoms, sometimes to the point of acute distress   - pt and wife confirm symptoms of anxiety affecting daily life; takes hydroxyzine HS for this and related insomnia; has required additional day time doses of Hydroxyzine to assist with anxiety symptoms  - hydroxyzine now available PRN through out the day as well; advised administration is timed to allow for HS dosing to assist with insomnia   - also discussed symptoms depression; recommendation for SSRI to help manage both anxiety and depression symptoms; will allow for cardiac procedure and ensure pt is no longer experiencing/nausea or vomiting before starting   - discussed role of home/clinic palliative in additional management of medication regimen and symptoms   - 3/23 overnight had severe episode of anxiety which ativan was given to attempt to relieve; post ativan became combative/disoriented requiring restraints and precedex; per wife similar episode with only other known time pt received ativan  - 3/17 - anxiety/restlessness observed during ICU rounds; requested ordered hydroxyzine be administered; anxiety seemed to be related to cath lab procedure today and back pain (ongoing pain interventions by RN including ordered fentanyl discussed plan to attempt bedside chair as soon as allowed post cath lab procedure to attempt relief of back pain)  - later revisted pt post     - plan  discussed with hospital primary       ENT  Tracheostomy present  - see respiratory distress and squamous cell CA   - was initially admitted with significant bleeding to trach site which is now resolved, per notes and MDT discussion  - now off vent   - pt has frustrations with limited ability to verbalize/communicate; can become frustrated/anxious/dyspneic at times related to this; writes in a notebook often to support communication   - noted; management per hospital primary    Pulmonary  Acute on chronic respiratory failure with hypoxia  - trach; now off vent   - respiratory status greatly affected during periods of anxiety   - noted; management per hospital primary and [facility]     Cardiac/Vascular  Coronary artery disease involving native coronary artery of native heart with unstable angina pectoris  - cardiology consulted and following   - Echo showed EF 60%, inferobasal hypokinesis, indeterminate diastolic function  - nitro drip weaning; pt denying any continued angina/pain   - cardiac cath procedure on 3/23; per cardiology poor CABG candidate; additional cath lab procedure planned 3/27  - pt previously agreeable to any recommended cardiac procedures at this time   - noted; management per hospital primary, [facility], and Cardiology     Oncology  Squamous cell cancer of tongue  -Diagnosed 12/15/21 via FNA; Underwent total glossectomy, bilateral neck dissection, bilateral cervical facial flaps and anterolateral thigh flap reconstruction of glossectomy defect; Completed adjuvant chemoradiation  -Followed by oncology and ENT outpt. No longer on chemo or radiation.   - has a trach since above treatment   - noted; management per hospital primary     GI  PEG (percutaneous endoscopic gastrostomy) status  - PEG for feeding and meds; tube feeds typically well tolerated and no difficulty with home management by wife   - noted; management per hospital primary     Palliative Care  ACP (advance care planning)  Palliative  Encounter  Date: 3/27/2023  - interval chart reviewed in detail; discussed pt with ICU MDT Rounds   - pt observed to be very anxious/restless during rounds; discussed administering hydroxizine with KAMILAH Doran at that time and immediately following rounds met with pt and wife (see anxiety)  - ongoing discussion of anxiety management; plan to further address post cath lab procedures, including addition of SSRI and more long term management   - discussed planned procedure today; pt continues to be agreeable to any offered procedures to improve condition   - emotional support provided to pt and wife; allowed time for questions/concerns      Palliative Encounter  Date: 3/23/2023  - interval chart reviewed in detail; discussed pt with ICU MDT Rounds   - pt very lethargic today post precedex (see anxiety); met with wife at bedside and discussed overnight events; wife does not wish for pt to receive ativan again in the future  - continue GOC/ACP discussion; awaiting discussing cardiac intervention options with cardiology   - wife continues to support pt's wishes for full treatment; she plans to take leave from work at time of discharge to care for pt     - re-discussed continued OP follow up with palliative medicine recommendation; wife prefers home palliative as opposed to clinic   - will discuss with CM/SW home palliative information session coordination with a hospice provider that also has hospice for future continuity of care; pt initially mistrusting of palliative medicine but is now agreeable; feel building a relationship with a palliative provider will help to establish trust for future care needs if/when hospice would be needed   - emotional support provided to wife; answered all questions   - discussed with hospital primary   Palliative Encounter  Date: 3/23/2023  - interval chart reviewed in detail; discussed pt with ICU MDT Rounds   - met with pt and wife at bedside for planned GOC/ACP discussion; pt with  improved calmness related to this discussion; reassured pt at anytime if there was a topic he did not wish to discuss, or wished to stop meeting to let me know   - introduction to palliative medicine and role in current/future care provided to pt's wife  - discussed previously stated goals (as discussed with Dr. Escoto); pt confirms confirmed continued desire for full code and full treatment; reassured these wishes are and will be respected; encouraged that if at anytime those wishes were to change that it could be discussed further at that time   - allowed pt and wife to share long road of cancer treatment and how this affects current GOC   - plan now in place for cardiac cath procedure today; briefly discussed  - discussed pt's symptoms of anxiety; wife also provided concerns of pt having a more depressed mood; validated anxiety and depression symptoms given pt's condition and other symptoms; discussed correlation of anxiety and respiratory symptoms; discussed options for anxiety management (see anxiety)   - pt's wife shared pt's fear of requiring a nursing home as likely source of anxiety related to previous visits; pt advocates that he would never wish to live in a  NH; wife is primary care giver and confirms they have a lot of equipment at home to help care for pt, and she is well versed on his care needs; bedside RN shared wife's concern for needed feeding supplies and requesting CM/SW assistance prior to discharge; CM/SW consult placed   - recommended home palliative or palliative clinic for continued palliative support, symptom management, and ACP discussions; they are agreeable to this; plan for follow up to determine if home palliative or clinic preferred  - emotional support provided; allowed time for questions/concerns, all addressed   - hospital primary updated    Palliative Consult  Date: 3/22/2023 (date corrections to initial consult)   - consult received; interval chart reviewed in detail; discussed  "pt during ICU MDT rounds   - met with patient at ICU bedside; introduction to palliative medicine team and role in current care and admission  - initial consult on 3/21, but pt's condition did not allow for GOC discussion, post poned as to not cause increased anxiety and respiratory compromise; previous detailed GOC/ACP discussion with, then primary, Dr. Escoto   - Assessed pt and discussed plan to have GOC/ACP discussion with wife present later in the day  - as time for planned discussion neared, pt became very anxious of pending discussion; discussed role of palliative and assured that I do not wish to cause pt anxiety about discussions, and he can request to not discuss at all; explained my intentions of meeting and that I would not try to persuade him into any plans that do not align with his GOC   - validated all the hard work and things pt has overcome in his cancer treatment   - pt seemed very relieved by above discussion and agreeable for further meeting on 3/23, with wife present   - emotional support provided; allowed time for questions/concerns, all addressed   - MDT updated, and ongoing communication with MDT         I will follow-up with patient. Please contact us if you have any additional questions.    Subjective:     Chief Complaint:   Chief Complaint   Patient presents with    Hemoptysis     Ems called to 74yo male that wife noticed was having blood y sputum in his trach on Wednesday. Went thursdsay to have a trach change and the dr was unale to scope his mouth above the trach due to him gagging but did change the trach. Continued to have the pink sputum until 0300 today and it had bright red blood from trach. Denied any pain or SOB       HPI:   From H&P: " Fareed NATALIIA Richard Jr. 73 y.o. 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  Per his wife 4 days ago he began to have pink tinged " "sputum via his trach.  He was seen by his ENT 2 days ago with a scope exam and a trach exchange without evidence of bleeding.  This morning at 3:00 a.m. he developed bright red blood via trach which resolved without intervention.  It is since not recurred.  He takes a daily aspirin.  He receives all medications via G-tube.  His tube feeding regimen consists of 6 cans of Isosource daily with 120 cc free water boluses.  He denies fever chest pain shortness of breath nausea vomiting abdominal pain leg swelling syncope dizziness dysuria melena hematuria hematemesis.     In the ED, hemoglobin 7.6 INR 1.0 BUN 50 creatinine 0.7  troponin negative BRCA negative O positive type and screen chest x-ray without detrimental change hypotensive to 85/54."     Palliative medicine consulted for goals of care and advance care planning; Met with pt at bedside; for details of visit, see advance care planning section of plan.         Hospital Course:  No notes on file    Past Medical History:   Diagnosis Date    Cancer     skin to Rt forearm and face    COPD (chronic obstructive pulmonary disease)     Hyperlipidemia     Hypertension     Pseudoaneurysm      Past Surgical History:   Procedure Laterality Date    ABDOMINAL SURGERY      stents placed in liver and large intestines, per patient    CAROTID STENT      COLONOSCOPY N/A 09/27/2022    Procedure: COLONOSCOPY;  Surgeon: Donnie Peterson MD;  Location: [facility] ([address] [place] [address]);  Service: Endoscopy;  Laterality: N/A;    COLONOSCOPY N/A 09/30/2022    Procedure: COLONOSCOPY;  Surgeon: Joy Cabrera MD;  Location: [facility] ([address] [place] [address]);  Service: Endoscopy;  Laterality: N/A;    CORONARY STENT PLACEMENT  01/2000    DISSECTION OF NECK Bilateral 01/04/2022    Procedure: DISSECTION, NECK;  Surgeon: Naeem Smalls MD;  Location: [facility] OR [address] [place] [address];  Service: ENT;  Laterality: Bilateral;    ESOPHAGOGASTRODUODENOSCOPY N/A 09/27/2022    Procedure: EGD (ESOPHAGOGASTRODUODENOSCOPY);  " Surgeon: Donnie Peterson MD;  Location: [facility] ENDO (2ND FLR);  Service: Endoscopy;  Laterality: N/A;    ESOPHAGOGASTRODUODENOSCOPY N/A 09/30/2022    Procedure: EGD (ESOPHAGOGASTRODUODENOSCOPY);  Surgeon: Joy Cabrera MD;  Location: [facility] ENDO (2ND FLR);  Service: Endoscopy;  Laterality: N/A;    ESOPHAGOGASTRODUODENOSCOPY W/ PEG N/A 01/04/2022    Procedure: EGD, WITH PEG TUBE INSERTION;  Surgeon: Anthony Calabrese MD;  [facility];  Service: General;  Laterality: N/A;    EYE SURGERY      Cataract bilateral    femoral stents      bilateral    FLAP PROCEDURE N/A 01/03/2022    Procedure: CREATION, FREE FLAP;  Surgeon: Naeem Smalls MD;  [facility];  Service: ENT;  Laterality: N/A;    FLAP PROCEDURE Right 01/04/2022    Procedure: CREATION, FREE FLAP;  Surgeon: Stacey Conde MD;  [facility];  Service: ENT;  Laterality: Right;  Ischemic start 1203  Ischemic stop 1353    GLOSSECTOMY N/A 01/04/2022    Procedure: GLOSSECTOMY;  Surgeon: Naeem Smalls MD;  [facility];  Service: ENT;  Laterality: N/A;    RADICAL NECK DISSECTION Left 01/03/2022    Procedure: DISSECTION, NECK, RADICAL;  Surgeon: Naeem Smalls MD;  [facility];  Service: ENT;  Laterality: Left;    SKIN BIOPSY      SKIN CANCER EXCISION      TRACHEOTOMY N/A 01/04/2022    Procedure: TRACHEOTOMY;  Surgeon: Naeem Smalls MD;  [facility];  Service: ENT;  Laterality: N/A;    VASCULAR SURGERY       Review of patient's allergies indicates:   Allergen Reactions    Ativan [lorazepam] Anxiety       Medications:  Continuous Infusions:   dexmedeTOMIDine (Precedex) infusion (titrating) Stopped (03/24/23 1100)    heparin (porcine) 1,500 Units/hr (03/27/23 1209)    nitroGLYCERIN      NORepinephrine bitartrate-D5W 0.02 mcg/kg/min (03/27/23 1522)    sodium chloride 0.9%      sodium chloride 0.9% 250 mL/hr at 03/27/23 1217     Scheduled Meds:   aspirin   81 mg Oral Daily    atorvastatin  80 mg Per G Tube Daily    clopidogreL  75 mg Oral Daily    enoxaparin  40 mg Subcutaneous Daily    ferrous sulfate  1 tablet Per G Tube Daily    furosemide  40 mg Per G Tube BID    glycopyrrolate  1 mg Per G Tube TID    LIDOcaine  2 patch Transdermal Q24H    melatonin  9 mg Per G Tube Nightly    metoprolol tartrate  25 mg Per G Tube BID    mupirocin   Nasal BID    polyethylene glycol  17 g Oral BID    sodium chloride 0.9%  10 mL Intravenous Q6H     PRN Meds:sodium chloride, acetaminophen, acetaminophen, acetaminophen, albuterol-ipratropium, atropine, fentaNYL, guaiFENesin 100 mg/5 ml, heparin (porcine), HYDROcodone-acetaminophen, HYDROcodone-acetaminophen, hydrOXYzine HCL, insulin aspart U-100, morphine, naloxone, nitroGLYCERIN, nitroGLYCERIN, nitroGLYCERIN, ondansetron, ondansetron, oxyCODONE, sodium chloride 0.9%, sodium chloride 0.9%, Flushing PICC Protocol **AND** sodium chloride 0.9% **AND** sodium chloride 0.9%    Family History    None       Tobacco Use    Smoking status: Former     Packs/day: 2.00     Years: 40.00     Pack years: 80.00     Types: Cigarettes     Start date: 1963     Quit date: 2018     Years since quittin.9    Smokeless tobacco: Never    Tobacco comments:     3/3 ppd x 40 yrs. Currently 3-4 cigarettes daily .He is trying  to quit. Is using a Vapor cigarettes  2-3 x's a day.   Substance and Sexual Activity    Alcohol use: Not Currently    Drug use: No    Sexual activity: Not Currently       Objective:     Vital Signs (Most Recent):  Temp: 97.7 °F (36.5 °C) (23 0700)  Pulse: 102 (23 1600)  Resp: 18 (23 1600)  BP: (!) 83/52 (23 1600)  SpO2: 100 % (23 1600)   Vital Signs (24h Range):  Temp:  [97.7 °F (36.5 °C)-98.1 °F (36.7 °C)] 97.7 °F (36.5 °C)  Pulse:  [] 102  Resp:  [11-44] 18  SpO2:  [87 %-100 %] 100 %  BP: ()/(45-76) 83/52  Arterial Line BP: (60-89)/(28-40) 89/40     Weight: 68 kg  (150 lb)  Body mass index is 22.81 kg/m².    Physical Exam  Constitutional:       General: He is sleeping. He is not in acute distress.     Appearance: He is ill-appearing.   HENT:      Head: Atraumatic.      Comments: History of facial/oral reconstruction   Pulmonary:      Effort: Pulmonary effort is normal.      Comments: Trach  Abdominal:      Comments: PEG   Musculoskeletal:      Right lower leg: No edema.      Left lower leg: No edema.   Neurological:      Mental Status: He is oriented to person, place, and time. He is lethargic.      Comments: Sleeping; arousable/follows commands    Psychiatric:         Mood and Affect: Mood is anxious.      Comments: Frustration with difficulties with communication        Advance Care Planning   Advance Directives:   Living Will: No    LaPOST: No    Do Not Resuscitate Status: No    Medical Power of : No      Decision Making:  Patient answered questions and Family answered questions  Goals of Care: What is most important right now is to focus on spending time at home, remaining as independent as possible, symptom/pain control, curative/life-prolongation (regardless of treatment burdens). Accordingly, we have decided that the best plan to meet the patient's goals includes continuing with treatment.       Significant Labs: All pertinent labs within the past 24 hours have been reviewed.  CBC:   Recent Labs   Lab 03/27/23  0355   WBC 11.80   HGB 8.4*   HCT 26.8*   MCV 93          BMP:  Recent Labs   Lab 03/27/23  0355   *      K 3.4*   CL 93*   CO2 41*   BUN 38*   CREATININE 0.9   CALCIUM 8.6*       LFT:  Lab Results   Component Value Date    AST 22 03/19/2023    ALKPHOS 65 03/19/2023    BILITOT 0.4 03/19/2023     Albumin:   Albumin   Date Value Ref Range Status   03/23/2023 2.2 (L) 3.5 - 5.2 g/dL Final     Protein:   Total Protein   Date Value Ref Range Status   03/19/2023 5.5 (L) 6.0 - 8.4 g/dL Final     Lactic acid:   Lab Results   Component Value  Date    LACTATE 1.8 03/19/2023    LACTATE 0.8 05/14/2022       Significant Imaging: I have reviewed all pertinent imaging results/findings within the past 24 hours.      Total visit time: 55 minutes    > 50% of  35  min visit spent in chart review, face to face discussion of symptom assessment, coordination of care with other specialists, and discharge planning.    20 min ACP time spent: goals of care, emotional support, formulating and communicating prognosis, exploring burden/ benefit of various approaches of treatment.       Trupti Beck NP  Palliative Medicine  [facility] - [place] - Intensive Care

## 2023-03-27 NOTE — ASSESSMENT & PLAN NOTE
- cardiology consulted and following   - Echo showed EF 60%, inferobasal hypokinesis, indeterminate diastolic function  - nitro drip weaning; pt denying any continued angina/pain   - cardiac cath procedure on 3/23; per cardiology poor CABG candidate; additional cath lab procedure planned 3/27  - pt previously agreeable to any recommended cardiac procedures at this time   - noted; management per hospital primary, PCCM, and Cardiology

## 2023-03-27 NOTE — NURSING
Saturation in the high 80's - suctioned thick yellow secretions - respiratory called for prn treatment

## 2023-03-27 NOTE — ASSESSMENT & PLAN NOTE
- see respiratory distress and squamous cell CA   - was initially admitted with significant bleeding to trach site which is now resolved, per notes and MDT discussion  - now off vent   - pt has frustrations with limited ability to verbalize/communicate; can become frustrated/anxious/dyspneic at times related to this; writes in a notebook often to support communication   - noted; management per hospital primary

## 2023-03-27 NOTE — NURSING
Patient still complaining of back/hip pain - ice pack applied - meds given - hypotension noted after meds given - awake alert and oriented - some anxiety - Dr Patel aware and at the bedside - wife present and updated    Spoke with patient regarding his calf pain and intermittent knee swelling and redness that occurred over the weekend.  We will order a STAT doppler ultrasound of the RLE to evaluate.  He would prefer to have this done at [facility].  Please contact him with time once scheduled

## 2023-03-27 NOTE — PLAN OF CARE
Skin integrity remains intact - able to move himself in the bed - patient is thin - nutrition status via peg tube feeds - will need to begin getting out of bed to the chair once bedrest completed after cath lab and patient feeling well enough

## 2023-03-27 NOTE — PLAN OF CARE
Recommendations    1. Continue with TF recs; Monitor diet advancements.   2. When diet advances, recommend clear liquid diet - advancing as tolerated.   3. Monitor weight changes; check weekly weights.    Goals: 1. Diet advancement by RD follow up.  Nutrition Goal Status: new  Communication of RD Recs: other (comment) (POC)    Assessment and Plan    Nutrition Problem  Inadequate PO intake    Related to (etiology):   Cancer    Signs and Symptoms (as evidenced by):   Tube feed as a means of nutrition       Interventions/Recommendations (treatment strategy):  Collaboration with medical providers  TF recommendations     Nutrition Diagnosis Status:   New

## 2023-03-27 NOTE — NURSING
[facility] [place]  Nurses Note -- 4 Eyes      3/27/2023       Skin assessed on: Q Shift      [x] No Pressure Injuries Present    [x]Prevention Measures Documented    [] Yes LDA  for Pressure Injury Previously documented     [] Yes New Pressure Injury Discovered   [] LDA for New Pressure Injury Added      Attending RN:  Vilma Rubin RN     Second RN:  Gwen TRIANA

## 2023-03-27 NOTE — ASSESSMENT & PLAN NOTE
- anxiety which worsens respiratory symptoms, sometimes to the point of acute distress   - pt and wife confirm symptoms of anxiety affecting daily life; takes hydroxyzine HS for this and related insomnia; has required additional day time doses of Hydroxyzine to assist with anxiety symptoms  - hydroxyzine now available PRN through out the day as well; advised administration is timed to allow for HS dosing to assist with insomnia   - also discussed symptoms depression; recommendation for SSRI to help manage both anxiety and depression symptoms; will allow for cardiac procedure and ensure pt is no longer experiencing/nausea or vomiting before starting   - discussed role of home/clinic palliative in additional management of medication regimen and symptoms   - 3/23 overnight had severe episode of anxiety which ativan was given to attempt to relieve; post ativan became combative/disoriented requiring restraints and precedex; per wife similar episode with only other known time pt received ativan  - 3/27 - anxiety/restlessness observed during ICU rounds; requested ordered hydroxyzine be administered; anxiety seemed to be related to cath lab procedure today and back pain (ongoing pain interventions by RN including ordered fentanyl discussed plan to attempt bedside chair as soon as allowed post cath lab procedure to attempt relief of back pain); pt assessed post hydroxyzine admin, symptoms improved including dyspnea       - plan discussed with hospital primary

## 2023-03-27 NOTE — NURSING
Patient back from cath lab - connected to cardiac monitoring - awake - suctioned trach - complaints of back pain - instructed to lie flat - right radial band on with air - deflate at 615 pm- right femoral sheath transduced - hypotensive - Dr Patel at the bedside - will start low dose levo till sheath pulled and administer pain meds to help make patient more comfortable

## 2023-03-27 NOTE — SUBJECTIVE & OBJECTIVE
Interval History:  having left side/hip pain this mroning and received fentanyl/ice packs which seemed to improve. No further bleeding. Plan for PCI today    Objective:     Vital Signs (Most Recent):  Temp: 97.7 °F (36.5 °C) (03/27/23 0700)  Pulse: 88 (03/27/23 1030)  Resp: 19 (03/27/23 1030)  BP: 96/62 (03/27/23 1030)  SpO2: 100 % (03/27/23 1030)   Vital Signs (24h Range):  Temp:  [97.7 °F (36.5 °C)-98.1 °F (36.7 °C)] 97.7 °F (36.5 °C)  Pulse:  [67-96] 88  Resp:  [11-44] 19  SpO2:  [87 %-100 %] 100 %  BP: ()/(45-76) 96/62     Weight: 68 kg (150 lb)  Body mass index is 22.81 kg/m².    Intake/Output Summary (Last 24 hours) at 3/27/2023 1206  Last data filed at 3/27/2023 0930  Gross per 24 hour   Intake 1710.75 ml   Output 1700 ml   Net 10.75 ml        Physical Exam  Vitals and nursing note reviewed.   Constitutional:       General: He is not in acute distress.     Appearance: He is well-developed. He is ill-appearing. He is not diaphoretic.   HENT:      Head: Normocephalic and atraumatic.   Eyes:      General:         Right eye: No discharge.         Left eye: No discharge.      Conjunctiva/sclera: Conjunctivae normal.   Neck:      Thyroid: No thyromegaly.      Comments: Trach in place.    Cardiovascular:      Rate and Rhythm: Normal rate and regular rhythm.      Heart sounds: No murmur heard.  Pulmonary:      Effort: Pulmonary effort is normal. No respiratory distress.      Breath sounds: Normal breath sounds.      Comments: Trach collar 10 L  Abdominal:      General: Bowel sounds are normal. There is no distension.      Palpations: Abdomen is soft. There is no mass.      Tenderness: There is no abdominal tenderness.      Comments: Peg tube in place   Musculoskeletal:         General: No deformity.   Skin:     General: Skin is warm and dry.   Neurological:      Mental Status: He is alert and oriented to person, place, and time.      Comments: Alert and awake, communicates (difficulty speaking with trach) at  baseline   Psychiatric:      Comments: Anxious       Significant Labs: All pertinent labs within the past 24 hours have been reviewed.    Significant Imaging: I have reviewed all pertinent imaging results/findings within the past 24 hours.    Review of Systems

## 2023-03-27 NOTE — ASSESSMENT & PLAN NOTE
Palliative Encounter  Date: 3/27/2023  - interval chart reviewed in detail; discussed pt with ICU MDT Rounds   - pt observed to be very anxious/restless during rounds; discussed administering hydroxizine with KAMILAH Doran at that time and immediately following rounds met with pt and wife (see anxiety)  - ongoing discussion of anxiety management; plan to further address post cath lab procedures, including addition of SSRI and more long term management   - discussed planned procedure today; pt continues to be agreeable to any offered procedures to improve condition   - emotional support provided to pt and wife; allowed time for questions/concerns      Palliative Encounter  Date: 3/23/2023  - interval chart reviewed in detail; discussed pt with ICU MDT Rounds   - pt very lethargic today post precedex (see anxiety); met with wife at bedside and discussed overnight events; wife does not wish for pt to receive ativan again in the future  - continue GOC/ACP discussion; awaiting discussing cardiac intervention options with cardiology   - wife continues to support pt's wishes for full treatment; she plans to take leave from work at time of discharge to care for pt     - re-discussed continued OP follow up with palliative medicine recommendation; wife prefers home palliative as opposed to clinic   - will discuss with CM/SW home palliative information session coordination with a hospice provider that also has hospice for future continuity of care; pt initially mistrusting of palliative medicine but is now agreeable; feel building a relationship with a palliative provider will help to establish trust for future care needs if/when hospice would be needed   - emotional support provided to wife; answered all questions   - discussed with hospital primary   Palliative Encounter  Date: 3/23/2023  - interval chart reviewed in detail; discussed pt with ICU MDT Rounds   - met with pt and wife at bedside for planned GOC/ACP discussion; pt  with improved calmness related to this discussion; reassured pt at anytime if there was a topic he did not wish to discuss, or wished to stop meeting to let me know   - introduction to palliative medicine and role in current/future care provided to pt's wife  - discussed previously stated goals (as discussed with Dr. Escoto); pt confirms confirmed continued desire for full code and full treatment; reassured these wishes are and will be respected; encouraged that if at anytime those wishes were to change that it could be discussed further at that time   - allowed pt and wife to share long road of cancer treatment and how this affects current GOC   - plan now in place for cardiac cath procedure today; briefly discussed  - discussed pt's symptoms of anxiety; wife also provided concerns of pt having a more depressed mood; validated anxiety and depression symptoms given pt's condition and other symptoms; discussed correlation of anxiety and respiratory symptoms; discussed options for anxiety management (see anxiety)   - pt's wife shared pt's fear of requiring a nursing home as likely source of anxiety related to previous visits; pt advocates that he would never wish to live in a  NH; wife is primary care giver and confirms they have a lot of equipment at home to help care for pt, and she is well versed on his care needs; bedside RN shared wife's concern for needed feeding supplies and requesting CM/SW assistance prior to discharge; CM/SW consult placed   - recommended home palliative or palliative clinic for continued palliative support, symptom management, and ACP discussions; they are agreeable to this; plan for follow up to determine if home palliative or clinic preferred  - emotional support provided; allowed time for questions/concerns, all addressed   - hospital primary updated    Palliative Consult  Date: 3/22/2023 (date corrections to initial consult)   - consult received; interval chart reviewed in detail;  discussed pt during ICU MDT rounds   - met with patient at ICU bedside; introduction to palliative medicine team and role in current care and admission  - initial consult on 3/21, but pt's condition did not allow for GOC discussion, post poned as to not cause increased anxiety and respiratory compromise; previous detailed GOC/ACP discussion with, then primary, Dr. Escoto   - Assessed pt and discussed plan to have GOC/ACP discussion with wife present later in the day  - as time for planned discussion neared, pt became very anxious of pending discussion; discussed role of palliative and assured that I do not wish to cause pt anxiety about discussions, and he can request to not discuss at all; explained my intentions of meeting and that I would not try to persuade him into any plans that do not align with his GOC   - validated all the hard work and things pt has overcome in his cancer treatment   - pt seemed very relieved by above discussion and agreeable for further meeting on 3/23, with wife present   - emotional support provided; allowed time for questions/concerns, all addressed   - MDT updated, and ongoing communication with MDT

## 2023-03-27 NOTE — NURSING
Patient lying in bed connected to cardiac monitoring - see flowsheet - Bharat sears with trach collar - saturation in the low 90's- oriented x4- lungs with crackles - ecchymotic areas to bilateral arms - picc line and saline lock noted - heparin and fluids infusing - remains npo - peg intact - abdomen is soft - skin intact - small dressing to the left groin - voids per urinal - peripheral pulses present - complaints of pain to the left lower back region - reposition

## 2023-03-27 NOTE — PROGRESS NOTES
"  [facility]  Adult Nutrition  Consult Note    SUMMARY     Recommendations    1. Continue with TF recs; Monitor diet advancements.   2. When diet advances, recommend clear liquid diet - advancing as tolerated.   3. Monitor weight changes; check weekly weights.    Goals: 1. Diet advancement by RD follow up.  Nutrition Goal Status: new  Communication of RD Recs: other (comment) (POC)    Assessment and Plan    Nutrition Problem  Inadequate PO intake    Related to (etiology):   Cancer    Signs and Symptoms (as evidenced by):   Tube feed as a means of nutrition       Interventions/Recommendations (treatment strategy):  Collaboration with medical providers  TF recommendations     Nutrition Diagnosis Status:   New     Reason for Assessment    Reason For Assessment: length of stay  Diagnosis:  (Hypotension)  Relevant Medical History: cancer, COPD, hyperlipidemia  Interdisciplinary Rounds: did not attend  General Information Comments: RD consutl 2/2 LOS > 8 days. Pt previous consult for TF recs. Currently on TF recs Tube Feedings/Formulas [facility]; Isosource 1.5 Harvey; Gastrostomy; Cans; 1.5 4 times daily. Pt has no recent significant weight changes. NFPE not able to be performed 2/2 pt out of room.  Nutrition Discharge Planning: Pending medical course    Nutrition Risk Screen    Nutrition Risk Screen: tube feeding or parenteral nutrition    Nutrition/Diet History    Food Allergies: NKFA    Anthropometrics    Temp: 97.7 °F (36.5 °C)  Height Method: Stated  Height: 5' 8" (172.7 cm)  Height (inches): 68 in  Weight Method: Bed Scale  Weight: 68 kg (150 lb)  Weight (lb): 150 lb  Ideal Body Weight (IBW), Male: 154 lb  % Ideal Body Weight, Male (lb): 97.4 %  BMI (Calculated): 22.8  BMI Grade: 18.5-24.9 - normal       Lab/Procedures/Meds    Pertinent Labs Reviewed: reviewed  Pertinent Labs Comments: k 3.4, chloride 93, co2 41, anion gap 7, BUN 38, Glu 139, ca 8.6  Pertinent Medications Reviewed: reviewed  Current " Outpatient Medications   Medication Instructions    amLODIPine (NORVASC) 10 mg, Oral    atorvastatin (LIPITOR) 20 mg, Per G Tube, Daily    bisacodyL (DULCOLAX) 10 mg, Rectal, Daily PRN    clopidogreL (PLAVIX) 75 mg, Oral, Daily    glycopyrrolate (CUVPOSA) 1 mg/5 mL (0.2 mg/mL) Soln Take 5 mLs (1 mg total) by mouth 3 (three) times daily as needed (TO REDUCE SECRETIONS).    guaiFENesin 400 mg, Oral, Every 4 hours PRN    hydrOXYzine HCL (ATARAX) 25 MG tablet SMARTSI.5 Tablet(s) Gastro Tube Every 8 Hours PRN    melatonin (MELATIN) 6 mg, Per G Tube, Nightly    metoprolol succinate (TOPROL-XL) 200 mg, Oral, 2 times daily    multivit-min/FA/lycopen/lutein (CENTRUM SILVER MEN ORAL) Oral    omeprazole (PRILOSEC) 40 MG capsule Take 40 mg (contents of one capsule) twice daily through PEG tube. Mix contents of capsule with 10 mL applesauce.    oxyCODONE (ROXICODONE) 5 mg, Per G Tube, Every 4 hours PRN    polyethylene glycol (GLYCOLAX) 17 g, Per G Tube, 2 times daily    sodium chloride (OCEAN) 0.65 % nasal spray 1 spray, Nasal, As needed (PRN)    sodium chloride 3% 3 % nebulizer solution 4 mLs, Nebulization, 2 times daily    WIXELA INHUB 250-50 mcg/dose diskus inhaler SMARTSI Puff(s) By Mouth Every 12 Hours        Estimated/Assessed Needs    Weight Used For Calorie Calculations: 68 kg (150 lb)  Energy Calorie Requirements (kcal): 1533 kcal  Energy Need Method: Westfall-St Jeor (x 1.1)  Protein Requirements: 68 g (1 g/kg)  Weight Used For Protein Calculations: 68 kg (150 lb)        RDA Method (mL): 1533         Nutrition Prescription Ordered    Current Diet Order: tube feed    Evaluation of Received Nutrient/Fluid Intake    I/O: +4.1 L  Energy Calories Required: meeting needs  Protein Required: meeting needs  Fluid Required: meeting needs  Comments: lbm 3/18/23  Tolerance: tolerating  % Intake of Estimated Energy Needs: 75 - 100 %  % Meal Intake:NPO    Nutrition Risk    Level of Risk/Frequency of Follow-up: low       Monitor  and Evaluation    Food and Nutrient Intake: energy intake, food and beverage intake, enteral nutrition intake, parenteral nutrition intake  Food and Nutrient Adminstration: diet order, enteral and parenteral nutrition administration  Knowledge/Beliefs/Attitudes: food and nutrition knowledge/skill, beliefs and attitudes  Physical Activity and Function: nutrition-related ADLs and IADLs, factors affecting access to physical activity  Anthropometric Measurements: weight, weight change, body mass index  Biochemical Data, Medical Tests and Procedures: electrolyte and renal panel, gastrointestinal profile, glucose/endocrine profile, inflammatory profile, lipid profile  Nutrition-Focused Physical Findings: overall appearance       Nutrition Follow-Up    RD Follow-up?: Yes    Ambar Dutta, Registration Eligible, Provisional LDN

## 2023-03-27 NOTE — NURSING
B/p cuff had been removed several times due to pain and attempting to secure new reliable area to obtain pressure - see flowsheet

## 2023-03-27 NOTE — BRIEF OP NOTE
[facility] - Cath Lab  Brief Operative Note     SUMMARY     Surgery Date: 3/27/2023     Surgeon(s) and Role:     * Tim Bhatt MD - Primary    Assisting Surgeon: None    Pre-op Diagnosis:  NSTEMI (non-ST elevated myocardial infarction) [I21.4]    Post-op Diagnosis:  Post-Op Diagnosis Codes:     * NSTEMI (non-ST elevated myocardial infarction) [I21.4]    Procedure(s) (LRB):  Angioplasty-coronary (Left)    Anesthesia: RN IV Sedation    Description of the findings of the procedure: uneventful LHC/cor angio/Ao angio/iliac angio/PCI prox LAD BRADLEY x1, PCI mid LCx BRADLEY x1/R rad vasband/RFA man comp.    Findings/Key Components:  LVEDP: 3mmHg  LVEF: 60%    Aortic arch: severe calcific atherosclerosis  Ost innominate artery 80%  Prox R SCA 90%, 53mmHg gradient across stenoses.  Prox-mid ICA patent  Distal aortoiliac bifurcation with patent stents in ЮЛИЯ bilaterally and possible severe ISR in R ЮЛИЯ.    Severe diffuse cor Ca++  Dominance: Right  LM: MLI  LAD: prox 95% at D1, mid   Ramus: MLI  LCx: mid 90%  RCA: not injected, diffuse disease, severe dist RCA stenosis (see Dr. Benitez cath report    PCI prox LAD:  Preop ASA/Plavix, intraop heparin  Wired LAD/D1  Predil 2.5x12, 2.75x15 NC  Stent 3.0x24 Synergy BRADLEY  Post IVUS with mild underexpansion mid stent  Postdil 3.25x20 NC at 22atm  Excellent angiographic result, T3 flow, 0% residual stenosis    PCI mid LCx  Predil 2.5x12  Stent 2.75x20 Synergy BRADLEY 14 fidelia  Unavble to pass IVUS due to severe prox LCx tortuosity  Excellent angiographic result, T3 flow, 0% residual stenosis    Hemostasis:  R Radial band  RFA man comp    Impression:  NSTEMI with ongoing unstable sxs  Severe Ca++ 3V CAD, normal LVx  Severe PAD with severe innominate and prox R SCA stenoses and R ЮЛИЯ ISR.  Successful MV PCI:  Prox LAD 3.0x24 Synergy BRADLEY  Mid LCx 2.75x20 Synergy BRADLEY  RFA man comp  R rad vasband    Plan:  Cont med rx  ASA 81mg qd indefinitely  Plavix for minimum 1 year (thru 3/2024). If  absolutely necessary, DAPT can be stopped in 30 day (4/27/23).  Would consider prolonged rx if pt tolerates.    Statin  Dispo planning as per ICU team  Follow up with Dr. Bhatt    Estimated Blood Loss: <50cc         Specimens: None

## 2023-03-28 PROBLEM — K68.3 RETROPERITONEAL HEMATOMA: Status: ACTIVE | Noted: 2023-03-28

## 2023-03-28 LAB
ANION GAP SERPL CALC-SCNC: 18 MMOL/L (ref 8–16)
ANION GAP SERPL CALC-SCNC: 22 MMOL/L (ref 8–16)
APTT PPP: 26.5 SEC (ref 21–32)
BASOPHILS # BLD AUTO: 0.01 K/UL (ref 0–0.2)
BASOPHILS # BLD AUTO: 0.02 K/UL (ref 0–0.2)
BASOPHILS NFR BLD: 0.1 % (ref 0–1.9)
BASOPHILS NFR BLD: 0.1 % (ref 0–1.9)
BLD PROD TYP BPU: NORMAL
BLD PROD TYP BPU: NORMAL
BLOOD UNIT EXPIRATION DATE: NORMAL
BLOOD UNIT EXPIRATION DATE: NORMAL
BLOOD UNIT TYPE CODE: 5100
BLOOD UNIT TYPE CODE: 5100
BLOOD UNIT TYPE: NORMAL
BLOOD UNIT TYPE: NORMAL
BUN SERPL-MCNC: 42 MG/DL (ref 8–23)
BUN SERPL-MCNC: 44 MG/DL (ref 8–23)
CALCIUM SERPL-MCNC: 8 MG/DL (ref 8.7–10.5)
CALCIUM SERPL-MCNC: 8 MG/DL (ref 8.7–10.5)
CHLORIDE SERPL-SCNC: 100 MMOL/L (ref 95–110)
CHLORIDE SERPL-SCNC: 101 MMOL/L (ref 95–110)
CO2 SERPL-SCNC: 18 MMOL/L (ref 23–29)
CO2 SERPL-SCNC: 19 MMOL/L (ref 23–29)
CODING SYSTEM: NORMAL
CODING SYSTEM: NORMAL
CREAT SERPL-MCNC: 1.3 MG/DL (ref 0.5–1.4)
CREAT SERPL-MCNC: 1.3 MG/DL (ref 0.5–1.4)
CROSSMATCH INTERPRETATION: NORMAL
CROSSMATCH INTERPRETATION: NORMAL
DIFFERENTIAL METHOD: ABNORMAL
DIFFERENTIAL METHOD: ABNORMAL
DISPENSE STATUS: NORMAL
DISPENSE STATUS: NORMAL
EOSINOPHIL # BLD AUTO: 0 K/UL (ref 0–0.5)
EOSINOPHIL # BLD AUTO: 0 K/UL (ref 0–0.5)
EOSINOPHIL NFR BLD: 0 % (ref 0–8)
EOSINOPHIL NFR BLD: 0 % (ref 0–8)
ERYTHROCYTE [DISTWIDTH] IN BLOOD BY AUTOMATED COUNT: 14.8 % (ref 11.5–14.5)
ERYTHROCYTE [DISTWIDTH] IN BLOOD BY AUTOMATED COUNT: 15.1 % (ref 11.5–14.5)
EST. GFR  (NO RACE VARIABLE): 58 ML/MIN/1.73 M^2
EST. GFR  (NO RACE VARIABLE): 58 ML/MIN/1.73 M^2
GLUCOSE SERPL-MCNC: 153 MG/DL (ref 70–110)
GLUCOSE SERPL-MCNC: 159 MG/DL (ref 70–110)
HCT VFR BLD AUTO: 23.5 % (ref 40–54)
HCT VFR BLD AUTO: 24.3 % (ref 40–54)
HGB BLD-MCNC: 6.7 G/DL (ref 14–18)
HGB BLD-MCNC: 7.4 G/DL (ref 14–18)
HGB BLD-MCNC: 7.9 G/DL (ref 14–18)
HGB BLD-MCNC: 8.1 G/DL (ref 14–18)
IMM GRANULOCYTES # BLD AUTO: 0.17 K/UL (ref 0–0.04)
IMM GRANULOCYTES # BLD AUTO: 0.31 K/UL (ref 0–0.04)
IMM GRANULOCYTES NFR BLD AUTO: 0.9 % (ref 0–0.5)
IMM GRANULOCYTES NFR BLD AUTO: 1.4 % (ref 0–0.5)
LYMPHOCYTES # BLD AUTO: 0.6 K/UL (ref 1–4.8)
LYMPHOCYTES # BLD AUTO: 0.7 K/UL (ref 1–4.8)
LYMPHOCYTES NFR BLD: 2.8 % (ref 18–48)
LYMPHOCYTES NFR BLD: 3.6 % (ref 18–48)
MCH RBC QN AUTO: 28.9 PG (ref 27–31)
MCH RBC QN AUTO: 29.5 PG (ref 27–31)
MCHC RBC AUTO-ENTMCNC: 31.5 G/DL (ref 32–36)
MCHC RBC AUTO-ENTMCNC: 33.3 G/DL (ref 32–36)
MCV RBC AUTO: 88 FL (ref 82–98)
MCV RBC AUTO: 92 FL (ref 82–98)
MONOCYTES # BLD AUTO: 1.5 K/UL (ref 0.3–1)
MONOCYTES # BLD AUTO: 1.8 K/UL (ref 0.3–1)
MONOCYTES NFR BLD: 8 % (ref 4–15)
MONOCYTES NFR BLD: 8.1 % (ref 4–15)
NEUTROPHILS # BLD AUTO: 16.4 K/UL (ref 1.8–7.7)
NEUTROPHILS # BLD AUTO: 19.5 K/UL (ref 1.8–7.7)
NEUTROPHILS NFR BLD: 87.4 % (ref 38–73)
NEUTROPHILS NFR BLD: 87.6 % (ref 38–73)
NRBC BLD-RTO: 0 /100 WBC
NRBC BLD-RTO: 0 /100 WBC
NUM UNITS TRANS PACKED RBC: NORMAL
PLATELET # BLD AUTO: 269 K/UL (ref 150–450)
PLATELET # BLD AUTO: 309 K/UL (ref 150–450)
PMV BLD AUTO: 9.9 FL (ref 9.2–12.9)
PMV BLD AUTO: 9.9 FL (ref 9.2–12.9)
POCT GLUCOSE: 177 MG/DL (ref 70–110)
POCT GLUCOSE: 186 MG/DL (ref 70–110)
POCT GLUCOSE: 188 MG/DL (ref 70–110)
POCT GLUCOSE: 208 MG/DL (ref 70–110)
POTASSIUM SERPL-SCNC: 4.4 MMOL/L (ref 3.5–5.1)
POTASSIUM SERPL-SCNC: 4.4 MMOL/L (ref 3.5–5.1)
RBC # BLD AUTO: 2.56 M/UL (ref 4.6–6.2)
RBC # BLD AUTO: 2.75 M/UL (ref 4.6–6.2)
SODIUM SERPL-SCNC: 138 MMOL/L (ref 136–145)
SODIUM SERPL-SCNC: 140 MMOL/L (ref 136–145)
TRANS ERYTHROCYTES VOL PATIENT: NORMAL ML
WBC # BLD AUTO: 18.69 K/UL (ref 3.9–12.7)
WBC # BLD AUTO: 22.23 K/UL (ref 3.9–12.7)

## 2023-03-28 PROCEDURE — 85018 HEMOGLOBIN: CPT | Performed by: STUDENT IN AN ORGANIZED HEALTH CARE EDUCATION/TRAINING PROGRAM

## 2023-03-28 PROCEDURE — 99291 CRITICAL CARE FIRST HOUR: CPT | Mod: ,,, | Performed by: INTERNAL MEDICINE

## 2023-03-28 PROCEDURE — 99291 PR CRITICAL CARE, E/M 30-74 MINUTES: ICD-10-PCS | Mod: ,,, | Performed by: INTERNAL MEDICINE

## 2023-03-28 PROCEDURE — 25000003 PHARM REV CODE 250: Performed by: INTERNAL MEDICINE

## 2023-03-28 PROCEDURE — 25000003 PHARM REV CODE 250: Performed by: HOSPITALIST

## 2023-03-28 PROCEDURE — 85025 COMPLETE CBC W/AUTO DIFF WBC: CPT | Mod: 91 | Performed by: INTERNAL MEDICINE

## 2023-03-28 PROCEDURE — 85025 COMPLETE CBC W/AUTO DIFF WBC: CPT | Performed by: HOSPITALIST

## 2023-03-28 PROCEDURE — 99900035 HC TECH TIME PER 15 MIN (STAT)

## 2023-03-28 PROCEDURE — P9016 RBC LEUKOCYTES REDUCED: HCPCS | Performed by: INTERNAL MEDICINE

## 2023-03-28 PROCEDURE — 25000242 PHARM REV CODE 250 ALT 637 W/ HCPCS: Performed by: HOSPITALIST

## 2023-03-28 PROCEDURE — 63600175 PHARM REV CODE 636 W HCPCS: Performed by: STUDENT IN AN ORGANIZED HEALTH CARE EDUCATION/TRAINING PROGRAM

## 2023-03-28 PROCEDURE — 51798 US URINE CAPACITY MEASURE: CPT

## 2023-03-28 PROCEDURE — 99233 PR SUBSEQUENT HOSPITAL CARE,LEVL III: ICD-10-PCS | Mod: ,,, | Performed by: REGISTERED NURSE

## 2023-03-28 PROCEDURE — 25500020 PHARM REV CODE 255: Performed by: STUDENT IN AN ORGANIZED HEALTH CARE EDUCATION/TRAINING PROGRAM

## 2023-03-28 PROCEDURE — 85730 THROMBOPLASTIN TIME PARTIAL: CPT | Performed by: STUDENT IN AN ORGANIZED HEALTH CARE EDUCATION/TRAINING PROGRAM

## 2023-03-28 PROCEDURE — 80048 BASIC METABOLIC PNL TOTAL CA: CPT | Mod: 91 | Performed by: STUDENT IN AN ORGANIZED HEALTH CARE EDUCATION/TRAINING PROGRAM

## 2023-03-28 PROCEDURE — 99497 PR ADVNCD CARE PLAN 30 MIN: ICD-10-PCS | Mod: ,,, | Performed by: REGISTERED NURSE

## 2023-03-28 PROCEDURE — 51701 INSERT BLADDER CATHETER: CPT

## 2023-03-28 PROCEDURE — A4216 STERILE WATER/SALINE, 10 ML: HCPCS | Performed by: HOSPITALIST

## 2023-03-28 PROCEDURE — 63600175 PHARM REV CODE 636 W HCPCS: Performed by: INTERNAL MEDICINE

## 2023-03-28 PROCEDURE — 94761 N-INVAS EAR/PLS OXIMETRY MLT: CPT

## 2023-03-28 PROCEDURE — 80048 BASIC METABOLIC PNL TOTAL CA: CPT | Performed by: INTERNAL MEDICINE

## 2023-03-28 PROCEDURE — 27000221 HC OXYGEN, UP TO 24 HOURS

## 2023-03-28 PROCEDURE — 20000000 HC ICU ROOM

## 2023-03-28 PROCEDURE — 25000003 PHARM REV CODE 250: Performed by: REGISTERED NURSE

## 2023-03-28 PROCEDURE — 25000003 PHARM REV CODE 250: Performed by: STUDENT IN AN ORGANIZED HEALTH CARE EDUCATION/TRAINING PROGRAM

## 2023-03-28 PROCEDURE — 99497 ADVNCD CARE PLAN 30 MIN: CPT | Mod: ,,, | Performed by: REGISTERED NURSE

## 2023-03-28 PROCEDURE — 94640 AIRWAY INHALATION TREATMENT: CPT

## 2023-03-28 PROCEDURE — 99233 SBSQ HOSP IP/OBS HIGH 50: CPT | Mod: ,,, | Performed by: REGISTERED NURSE

## 2023-03-28 RX ORDER — ONDANSETRON 2 MG/ML
4 INJECTION INTRAMUSCULAR; INTRAVENOUS EVERY 6 HOURS PRN
Status: DISCONTINUED | OUTPATIENT
Start: 2023-03-28 | End: 2023-04-10

## 2023-03-28 RX ADMIN — FERROUS SULFATE TAB 325 MG (65 MG ELEMENTAL FE) 1 EACH: 325 (65 FE) TAB at 10:03

## 2023-03-28 RX ADMIN — HYDROCODONE BITARTRATE AND ACETAMINOPHEN 1 TABLET: 5; 325 TABLET ORAL at 06:03

## 2023-03-28 RX ADMIN — GLYCOPYRROLATE 1 MG: 1 TABLET ORAL at 10:03

## 2023-03-28 RX ADMIN — IPRATROPIUM BROMIDE AND ALBUTEROL SULFATE 3 ML: 2.5; .5 SOLUTION RESPIRATORY (INHALATION) at 03:03

## 2023-03-28 RX ADMIN — FUROSEMIDE 40 MG: 40 TABLET ORAL at 10:03

## 2023-03-28 RX ADMIN — POLYETHYLENE GLYCOL 3350 17 G: 17 POWDER, FOR SOLUTION ORAL at 09:03

## 2023-03-28 RX ADMIN — POLYETHYLENE GLYCOL 3350 17 G: 17 POWDER, FOR SOLUTION ORAL at 03:03

## 2023-03-28 RX ADMIN — ONDANSETRON 4 MG: 2 INJECTION INTRAMUSCULAR; INTRAVENOUS at 04:03

## 2023-03-28 RX ADMIN — ASPIRIN 81 MG CHEWABLE TABLET 81 MG: 81 TABLET CHEWABLE at 10:03

## 2023-03-28 RX ADMIN — GLYCOPYRROLATE 1 MG: 1 TABLET ORAL at 09:03

## 2023-03-28 RX ADMIN — IOHEXOL 80 ML: 350 INJECTION, SOLUTION INTRAVENOUS at 03:03

## 2023-03-28 RX ADMIN — HYDROXYZINE HYDROCHLORIDE 25 MG: 25 TABLET ORAL at 09:03

## 2023-03-28 RX ADMIN — ATORVASTATIN CALCIUM 80 MG: 40 TABLET, FILM COATED ORAL at 10:03

## 2023-03-28 RX ADMIN — GLYCOPYRROLATE 1 MG: 1 TABLET ORAL at 03:03

## 2023-03-28 RX ADMIN — Medication 10 ML: at 06:03

## 2023-03-28 RX ADMIN — CLOPIDOGREL BISULFATE 75 MG: 75 TABLET ORAL at 10:03

## 2023-03-28 RX ADMIN — FUROSEMIDE 40 MG: 40 TABLET ORAL at 06:03

## 2023-03-28 RX ADMIN — MELATONIN TAB 3 MG 9 MG: 3 TAB at 09:03

## 2023-03-28 RX ADMIN — Medication 10 ML: at 12:03

## 2023-03-28 NOTE — SUBJECTIVE & OBJECTIVE
Review of Systems   Reason unable to perform ROS: trach.   Objective:     Vital Signs (Most Recent):  Temp: 97.8 °F (36.6 °C) (03/28/23 0408)  Pulse: 103 (03/28/23 0600)  Resp: (!) 37 (03/28/23 0600)  BP: 133/60 (03/28/23 0600)  SpO2: (!) 93 % (03/28/23 0600)   Vital Signs (24h Range):  Temp:  [95.8 °F (35.4 °C)-97.8 °F (36.6 °C)] 97.8 °F (36.6 °C)  Pulse:  [] 103  Resp:  [15-41] 37  SpO2:  [75 %-100 %] 93 %  BP: ()/() 133/60  Arterial Line BP: (60-94)/(28-42) 94/42     Weight: 68 kg (150 lb)  Body mass index is 22.81 kg/m².     SpO2: (!) 93 %         Intake/Output Summary (Last 24 hours) at 3/28/2023 0630  Last data filed at 3/28/2023 0524  Gross per 24 hour   Intake 5666.52 ml   Output 725 ml   Net 4941.52 ml       Lines/Drains/Airways       Peripherally Inserted Central Catheter Line  Duration             PICC Triple Lumen 03/20/23 2145 right basilic 7 days              Drain  Duration                  Gastrostomy/Enterostomy 01/04/22 Percutaneous endoscopic gastrostomy (PEG)  days              Airway  Duration             Adult Surgical Airway 03/16/23 1014 Shiley Uncuffed 6.0 11 days              Peripheral Intravenous Line  Duration                  Peripheral IV - Single Lumen 03/20/23 1825 22 G Anterior;Right Forearm 7 days                  Examunchanged vs 3/25/23  Physical Exam  Constitutional:       General: He is not in acute distress.     Appearance: He is well-developed. He is ill-appearing. He is not toxic-appearing or diaphoretic.   HENT:      Head: Normocephalic and atraumatic.   Eyes:      General: No scleral icterus.     Extraocular Movements: Extraocular movements intact.      Conjunctiva/sclera: Conjunctivae normal.      Pupils: Pupils are equal, round, and reactive to light.   Neck:      Thyroid: No thyromegaly.      Vascular: No JVD.      Trachea: No tracheal deviation.      Comments: Trach in place  Cardiovascular:      Rate and Rhythm: Regular rhythm. Tachycardia  present.      Pulses:           Radial pulses are 2+ on the right side.      Heart sounds: S1 normal and S2 normal. Heart sounds are distant. No murmur heard.    No friction rub. No gallop.      Comments: R rad and RFA access site c/d/i  Pulmonary:      Effort: Pulmonary effort is normal. No respiratory distress.      Breath sounds: Normal breath sounds. No stridor. No wheezing, rhonchi or rales.   Chest:      Chest wall: No tenderness.   Abdominal:      General: There is no distension.      Palpations: Abdomen is soft.   Musculoskeletal:         General: No swelling or tenderness. Normal range of motion.      Cervical back: Normal range of motion and neck supple. No rigidity.      Right lower leg: No edema.      Left lower leg: No edema.   Skin:     General: Skin is warm and dry.      Coloration: Skin is not jaundiced.   Neurological:      General: No focal deficit present.      Mental Status: He is oriented to person, place, and time.      Cranial Nerves: No cranial nerve deficit.   Psychiatric:         Mood and Affect: Mood normal.         Behavior: Behavior normal.       Current Medications:   aspirin  81 mg Oral Daily    atorvastatin  80 mg Per G Tube Daily    clopidogreL  75 mg Oral Daily    ferrous sulfate  1 tablet Per G Tube Daily    furosemide  40 mg Per G Tube BID    glycopyrrolate  1 mg Per G Tube TID    LIDOcaine  2 patch Transdermal Q24H    melatonin  9 mg Per G Tube Nightly    metoprolol tartrate  25 mg Per G Tube BID    polyethylene glycol  17 g Oral BID    sodium chloride 0.9%  10 mL Intravenous Q6H      dexmedeTOMIDine (Precedex) infusion (titrating) Stopped (03/24/23 1100)    nitroGLYCERIN      NORepinephrine bitartrate-D5W 0.2 mcg/kg/min (03/28/23 0519)    sodium chloride 0.9% 250 mL/hr at 03/28/23 0519     sodium chloride, sodium chloride, sodium chloride, acetaminophen, acetaminophen, acetaminophen, albuterol-ipratropium, atropine, fentaNYL, guaiFENesin 100 mg/5 ml, HYDROcodone-acetaminophen,  HYDROcodone-acetaminophen, hydrOXYzine HCL, insulin aspart U-100, morphine, naloxone, nitroGLYCERIN, nitroGLYCERIN, nitroGLYCERIN, ondansetron, ondansetron, oxyCODONE, sodium chloride 0.9%, sodium chloride 0.9%, Flushing PICC Protocol **AND** sodium chloride 0.9% **AND** sodium chloride 0.9%    Laboratory (all labs reviewed):  CBC:  Recent Labs   Lab 03/24/23  0357 03/25/23  0429 03/26/23  0357 03/27/23  0355 03/27/23  2038 03/28/23  0205 03/28/23  0427   WBC 9.55 9.74 8.76 11.80  --   --  22.23 H   Hemoglobin 7.8 L 8.2 L 8.3 L 8.4 L 5.4 LL 6.7 L 7.4 L   Hematocrit 24.0 L 25.5 L 26.0 L 26.8 L  --   --  23.5 L   Platelets 217 245 233 301  --   --  309       CHEMISTRIES:  Recent Labs   Lab 05/10/22  1031 05/11/22  0412 05/12/22  0343 05/14/22  0724 05/17/22  1007 08/24/22  1000 03/22/23  0233 03/23/23  0332 03/24/23  0357 03/25/23  0429 03/26/23  0357 03/27/23  0355 03/28/23  0205 03/28/23  0427   Glucose 130 H 103 110 96 162 H   < > 181 H 178 H  --   --   --  139 H 153 H 159 H   Sodium 140 139 135 L 136 133 L   < > 137 135 L  --   --   --  141 140 138   Potassium 4.6 5.0 4.4 4.1 5.0   < > 4.2 3.8  --   --   --  3.4 L 4.4 4.4   BUN 22 26 H 24 H 18 15   < > 24 H 33 H  --   --   --  38 H 42 H 44 H   Creatinine 0.8 0.7 0.7 0.7 0.7   < > 0.8 0.7  --   --   --  0.9 1.3 1.3   eGFR if non African American >60.0 >60.0 >60.0 >60 >60.0  --   --   --   --   --   --   --   --   --    eGFR  --   --   --   --   --    < > >60 >60  --   --   --  >60 58 A 58 A   Calcium 9.7 9.7 9.1 9.6 9.5   < > 8.1 L 8.0 L  --   --   --  8.6 L 8.0 L 8.0 L   Magnesium  --  2.3 2.0  --  2.0   < > 1.9 2.1 2.2 2.4 2.3  --   --   --     < > = values in this interval not displayed.       CARDIAC BIOMARKERS:  Recent Labs   Lab 09/14/22  0459 03/18/23  1106 03/19/23  1308 03/19/23  1840 03/20/23  1642   Troponin I 0.010 0.010 1.736 H 2.222 H 3.416 H       COAGS:  Recent Labs   Lab 01/12/22  0510 09/14/22  0641 03/18/23  1106 03/20/23  1744 03/27/23  2143    INR 0.9 1.0 1.0 1.0 1.2       LIPIDS/LFTS:  Recent Labs   Lab 09/23/22  1247 09/30/22  0317 11/23/22  0733 03/18/23  1106 03/19/23  0413   AST 13 16 17 16 22   ALT 13 16 14 16 15       BNP:  Recent Labs   Lab 05/14/22  0725 09/14/22  0459 09/23/22  1247 03/18/23  1106 03/21/23  1525   BNP 98 185 H 468 H 136 H 2,543 H       TSH:  Recent Labs   Lab 02/16/22  1157 08/24/22  1000 11/23/22  0733 01/24/23  1105 03/19/23  1308   TSH 1.332 2.279 4.924 H 4.861 H 2.787       Free T4:  Recent Labs   Lab 11/23/22  0733 01/24/23  1105   Free T4 0.86 0.90       Diagnostic Results:  CTA A/P 3/28/23 (images personally reviewed and interpreted)  1.  Redemonstration of left-sided retroperitoneal hemorrhage of relatively stable and volume and distribution compared to prior examination.  No jamil extravasation of IV contrast material appreciated on the arterial phase or definitive pooling of contrast material on the delayed phase.  There is a subtle punctate focal hyperdensity only appreciated on the delayed phase which is favored to represent artifact, although small focus of venous hemorrhage would be difficult to exclude.  Consider short-term follow-up repeat CT as clinically warranted.  2.  Extensive atherosclerotic calcification of the abdominal aorta and major branch vessels.  There is apparent occlusion of the partially visualized right SFA.  Additionally, there are suspected hemodynamically significant stenosis involving the bilateral renal arteries.  Please see above for additional details.  3.  Redemonstration of persistent bilateral renal nephrograms with wedge-shaped regions of hypoattenuation predominantly involving the upper poles which may relate to evolving renal infarcts.  4.  Nonspecific urinary bladder wall thickening.  Moderate urinary bladder distension.    PCI LAD/LCx 3/27/23 (images personally reviewed and interpreted)  LVEDP: 3mmHg  LVEF: 60%     Aortic arch: severe calcific atherosclerosis  Ost innominate  artery 80%  Prox R SCA 90%, 53mmHg gradient across stenoses.  Prox-mid ICA patent  Distal aortoiliac bifurcation with patent stents in [place] bilaterally and possible severe ISR in R [place].     Severe diffuse cor Ca++  Dominance: Right  LM: MLI  LAD: prox 95% at D1, mid   Ramus: MLI  LCx: mid 90%  RCA: not injected, diffuse disease, severe dist RCA stenosis (see Dr. Benitez cath report     PCI prox LAD:  Preop ASA/Plavix, intraop heparin  Wired LAD/D1  Predil 2.5x12, 2.75x15 NC  Stent 3.0x24 Synergy BRADLEY  Post IVUS with mild underexpansion mid stent  Postdil 3.25x20 NC at 22atm  Excellent angiographic result, T3 flow, 0% residual stenosis  PCI mid LCx  Predil 2.5x12  Stent 2.75x20 Synergy BRADLEY 14 fidelia  Unavble to pass IVUS due to severe prox LCx tortuosity  Excellent angiographic result, T3 flow, 0% residual stenosis  Hemostasis:  R Radial band  RFA man comp  Impression:  NSTEMI with ongoing unstable sxs  Severe Ca++ 3V CAD, normal LVx  Severe PAD with severe innominate and prox R SCA stenoses and R ЮЛИЯ ISR.  Successful MV PCI:  Prox LAD 3.0x24 Synergy BRADLEY  Mid LCx 2.75x20 Synergy BRADLEY  RFA man comp  R rad vasband  Plan:  Cont med rx  ASA 81mg qd indefinitely  Plavix for minimum 1 year (thru 3/2024). If absolutely necessary, DAPT can be stopped in 30 day (4/27/23).  Would consider prolonged rx if pt tolerates.    Statin  Dispo planning as per ICU team  Follow up with Dr. Bhatt    Echo: 3/20/23  The left ventricle is normal in size with concentric hypertrophy and normal systolic function.  The estimated ejection fraction is 60%. Inferobasal hypokinesis  Indeterminate left ventricular diastolic function.  Normal right ventricular size with normal right ventricular systolic function.  Mild left atrial enlargement.  Mild right atrial enlargement.  Normal central venous pressure (3 mmHg).  The estimated PA systolic pressure is 13 mmHg.  The sinuses of Valsalva is mildly dilated. 4.2cm.    Cath: 3/23/23 (images personally  reviewed and interpreted)  3/23/23 [facility] - EDP 13, distal LM 30%, mild LAD 90% bifurcation lesion with D1, Cx mid 80%, RCA moderate diffuse disease with long 70% and some left to right collateral. All vessels heavily calcified  Reviewed with Dr Malone - poor candidate for CABG. Will discuss staged PCI of LAD/Cx if able to tolerate DAPT without further pulmonary bleeding

## 2023-03-28 NOTE — NURSING
Patient sitting up in the bed some nausea - connected peg to suction to aid with the nausea - effective - wife at the bedside

## 2023-03-28 NOTE — PROGRESS NOTES
[facility] Medicine  Progress Note    Patient Name: Fareed Richard Jr.  MRN: [id number]  Patient Class: IP- Inpatient   Admission Date: 3/18/2023  Length of Stay: 9 days  Attending Physician: Kelby Sargent MD  Primary Care Provider: Kodi Tubbs MD        Subjective:     Principal Problem:Hypotension        HPI:  Fareed Richard Jr. 73 y.o. 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  Per his wife 4 days ago he began to have pink tinged sputum via his trach.  He was seen by his ENT 2 days ago with a scope exam and a trach exchange without evidence of bleeding.  This morning at 3:00 a.m. he developed bright red blood via trach which resolved without intervention.  It is since not recurred.  He takes a daily aspirin.  He receives all medications via G-tube.  His tube feeding regimen consists of 6 cans of Isosource daily with 120 cc free water boluses.  He denies fever chest pain shortness of breath nausea vomiting abdominal pain leg swelling syncope dizziness dysuria melena hematuria hematemesis.    In the ED, hemoglobin 7.6 INR 1.0 BUN 50 creatinine 0.7  troponin negative BRCA negative O positive type and screen chest x-ray without detrimental change hypotensive to 85/54.      Overview/Hospital Course:  74 yo M w squamous CA of tongue s/p glossectomy and reconstructive flap with trach, CAD, chronic hypoxic respiratory failure (on 5L at home) and with peg presented for eval of hemoptysis 2 days after trach change in clinic.  Transferred to ICU 3/19 when markedly hypotensive.  Unclear if due to hemorrhage, cardiogenic or septic shock.  Did require PRBC and TXA nebs but didn't seem like sufficient bleeding to cause shock.  Bleeding has stopped and ENT is on board and following.  CTA shows either significant mucus plugging or bibasilar pneumonia - pulmonology favored  pneumonia.  Also has urine culture growing Enterococcus.  He is on broad spectrum antibiotics. Troponin checked due to hypotension and it was 1.17.  Cards consulted and feel it's demand due to ischemia and hypotension and recommend outpatient stress. AM cortisol was low so ordered cosyntropin stim test which shows an adequate adrenal response to r/o adrenal insufficiency.  Developed severe shoulder pain and hypoxia, increasing troponin and pulmonary edema on CXR - shoulder pain likely anginal equivalent. Discussed with Cardiology, started heparin and nitro drip. Required nitro drip for chest pain. Tolerating heparin drip without evidence of further tracheostomy bleeding. Further discussions with Cardiology, and brought patient for [facility] on 3/23 which showed distal LM 30%, mild LAD 90% bifurcation lesion with D1, Cx mid 80%, RCA moderate diffuse disease with long 70% and some left to right collateral. All vessels heavily calcified. Not a candidate for CABG but plan for staged PCI. Continues on heparin drip, asa/plavix and tolerating. Off pressor support/nitro drip. No further bleeding and Hb remains stable. Had episodes of hypoxia/respiratory distress after vomiting and was intermittently placed on ventilator -  now back to trach collar close to home O2 requirements. Plan for angiogram/PCI on 3/27 with Dr. Bhatt.      Interval History: Overnight pt went into shock and found to have L retroperitoneal hematoma    Review of Systems   Constitutional:  Negative for chills and fever.   Respiratory:  Negative for cough and shortness of breath.    Cardiovascular:  Negative for chest pain and leg swelling.   Gastrointestinal:  Positive for nausea. Negative for abdominal distention and abdominal pain.   Objective:     Vital Signs (Most Recent):  Temp: 98.8 °F (37.1 °C) (03/28/23 1530)  Pulse: 106 (03/28/23 1545)  Resp: (!) 22 (03/28/23 1545)  BP: (!) 104/54 (03/28/23 1545)  SpO2: (!) 86 % (03/28/23 1545)   Vital  Signs (24h Range):  Temp:  [95.8 °F (35.4 °C)-98.8 °F (37.1 °C)] 98.8 °F (37.1 °C)  Pulse:  [] 106  Resp:  [20-43] 22  SpO2:  [75 %-100 %] 86 %  BP: ()/() 104/54  Arterial Line BP: (89-94)/(40-42) 94/42     Weight: 68 kg (150 lb)  Body mass index is 22.81 kg/m².    Intake/Output Summary (Last 24 hours) at 3/28/2023 1606  Last data filed at 3/28/2023 1548  Gross per 24 hour   Intake 6106.52 ml   Output 575 ml   Net 5531.52 ml      Physical Exam  Constitutional:       General: He is not in acute distress.     Appearance: He is ill-appearing. He is not toxic-appearing or diaphoretic.   Cardiovascular:      Rate and Rhythm: Normal rate and regular rhythm.      Heart sounds: No murmur heard.    No gallop.   Pulmonary:      Comments: Diffuse coarse sounds at b/l lung bases. Trach in place  Abdominal:      General: Bowel sounds are normal. There is no distension.      Palpations: Abdomen is soft.      Tenderness: There is no abdominal tenderness.       Significant Labs: All pertinent labs within the past 24 hours have been reviewed.    Significant Imaging: I have reviewed all pertinent imaging results/findings within the past 24 hours.      Assessment/Plan:      * Hypotension  Presented with hypotension and bleeding from tracheostomy/hemoptysis.  Patient does have lower blood pressure readings however this blood pressure was concerning in the 70s.  Patient did have some bleeding but did not suspect it was hemorrhagic.  Also thought to be septic due to possible UTI/pneumonia and being treated for antibiotics.  Also concerns for cardiogenic with an NSTEMI and being on nitro.  He was treated appropriately with antibiotics for his UTI/pneumonia and was also treated for NSTEMI.  Intermittently requiring pressor support with sedation after he was put on the ventilator.  Now on trach collar.  Hypotension has now resolved and he is doing well.    Pt went into hemorrhagic shock on 3/27 w/ L RP bleed. No evidence  "of continued bleeding on CTA. Levophed weaned.        Retroperitoneal hematoma  See above      NSTEMI (non-ST elevated myocardial infarction)  -Denies chest pain but on 3/19 did have unexplained hypotension with severe back/shoulder pain  -Troponin on admit normal, but on 3/19 bumped to 1.7 and then 2.2 on 3/20.  -No jamil ischemic change on EKG  -Echo showed EF 60%, inferobasal hypokinesis, indeterminate diastolic function  -Cardiology consulted and input appreciated  -3/19 - Cardiology suspects this is due to demand ischemia associated with his anemia and hypotension.  Further, he is a poor candidate for angiogram given his hemoptysis and inability to treat with blood thinning medications.  Recommended outpatient stress testing  -Holding aspirin given hemoptysis and metoprolol given hypotension  -Continue statin.  Resume BB as able. On nitro drip for chest pain. - having difficulty weaning off. He is currently cleared for DAPT as no further bleeding issues at this time. Updated Cardiology.   -Patient underwent LHC/angiogram on 2/23 - findings below.  "3/23/23 LHC - EDP 13, distal LM 30%, mild LAD 90% bifurcation lesion with D1, Cx mid 80%, RCA moderate diffuse disease with long 70% and some left to right collateral. All vessels heavily calcified     Reviewed with Dr Malone - poor candidate for CABG. Will discuss staged PCI of LAD/Cx if able to tolerate DAPT without further pulmonary bleeding"    - Currently on ASA/Plavix and low dose heparin infusion. Cardiology discussing intervention with patient and wife. Plans for LHC on Monday 3/27.    Underwent LHC w/ LCx and LAD stents        Anxiety        UTI (urinary tract infection)  -Urine culture growing Enterococcus > 100,000 cfu/ml.  Sensitive to Vanc/Ampicillin  -Continue broad spectrum antibiotics for now and tailor based off results.  - has completed antibiotics.    Hemoptysis  -Treatment as above. This has resolved.    Elevated brain natriuretic peptide (BNP) " level  Hx noted      PEG (percutaneous endoscopic gastrostomy) status  -Continue medications via PEG. 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      Acute respiratory failure with hypoxia  Patient with Hypoxic Respiratory failure which is Acute on chronic.  he is on home oxygen at 2 LPM. Supplemental oxygen was provided and noted- Oxygen Concentration (%):  [40] 40.   Signs/symptoms of respiratory failure include- tachypnea and increased work of breathing. Contributing diagnoses includes - Aspiration and Pneumonia Labs and images were reviewed. Patient Has not had a recent ABG. Will treat underlying causes and adjust management of respiratory failure as follows   - changed out trach and now placed to ventilator for positive pressure  - had secretions in trach with exchange. Appears to be more comfortable now.    Acute on chronic respiratory failure with hypoxia  -Patient with acute on chronic hypoxic respiratory failure  -At home is on 5L O2 via trach and O2 sats typically in high 80s low 90s  -Sats presently similar, but is having episodes of hemoptysis  -Acute etiology is likely due to pneumonia, mucus plugging and hemoptysis.  -Consulted pulm-critical care and input appreciated  -Discussed with ENT on call at [facility] 3/19 and OK to keep at . Dr. French evaluated.  -Continue antibiotics as above.  -Continue supplemental O2 via Trach and wean as able. Placed on  mechanical ventilation on 3/21 for respiratory distress and increasing oxygen requirements.  Slowly weaning at this time.  Appears more comfortable today. Weaned off vent on 3/23. No need for ventilator at night.  - Currently on 10 liters trach collar    Acute blood loss anemia  -Hb on admit 7.6 and this morning 8.0  -Baseline Hb 8-9.  -Presented w intermittent hemoptysis via trach x 2 days. Had another episode on morning of 3/19/2023  -Seen at ENT 2 days prior to admit (Dr. Smalls) with exam without source. Trache exchanged at that time.   -Ferritin only 84 and Tsat 18% - consistent with iron deficiency  -Platelets and PT/INR are normal.  -Treated with TXA nebulizer and bleeding has stopped  -Started FeSO4 which he will need at discharge  -Repeat cbc in AM. Hb 7.7 but stable, no further evidence of bleeding. Again 7.1 but no further bleeding.   Stable at 8.2 - 8.3  - no further episodes of bleeding and tolerating heparin/plavix    Pt developed L sided RP hematoma. He was having L-sided pain prior to [facility] suggesting this was not the culprit. Went into shock which resolved with transfusion  - close monitoring    Other hyperlipidemia  -Continue home statin    Primary hypertension  -BP typically in the 90s to low 100s systolic per chart review.   -Noted hypotension 3/19 which is addressed above.  -Holding home metoprolol and amlodipine    Coronary artery disease involving native coronary artery of native heart with unstable angina pectoris  -Denies chest pain but on 3/19 did have unexplained hypotension with severe back/shoulder pain  -Troponin on admit normal, but on 3/19 bumped to 1.7 and then 2.2 on 3/20.  -No jamil ischemic change on EKG  -Echo showed EF 60%, inferobasal hypokinesis, indeterminate diastolic function  -Cardiology consulted and input appreciated  -Cardiology suspects this is due to demand ischemia associated with his anemia and hypotension.  Further, he is a poor candidate for angiogram given his  "hemoptysis and inability to treat with blood thinning medications.  Recommended outpatient stress testing  -Holding aspirin given hemoptysis and metoprolol given hypotension  -Continue statin.  Resume BB as able. On nitro drip for chest pain. - having difficulty weaning off. He is currently cleared for DAPT as no further bleeding issues at this time. Updated Cardiology.   -Patient underwent LHC/angiogram on 2/23 - findings below.  "3/23/23 LHC - EDP 13, distal LM 30%, mild LAD 90% bifurcation lesion with D1, Cx mid 80%, RCA moderate diffuse disease with long 70% and some left to right collateral. All vessels heavily calcified     Reviewed with Dr Malone - poor candidate for CABG. Will discuss staged PCI of LAD/Cx if able to tolerate DAPT without further pulmonary bleeding"    - Currently on ASA/Plavix and low dose heparin infusion. Cardiology discussing intervention with patient and wife today. No intervention planned for today.    S/p LAD/Cx PCI      Squamous cell cancer of tongue  -Diagnosed 12/15/21 via FNA.  Underwent total glossectomy, bilateral neck dissection, bilateral cervical facial flaps and anterolateral thigh flap reconstruction of glossectomy defect 1/4/22  -Completed adjuvant chemoradiation  -Followed by oncology and ENT outpt. No longer on chemo or radiation.   -Had trach changed by ENT 2 days prior to admit and scope at that time showed no signs of bleeding.   -Treatment as above.      VTE Risk Mitigation (From admission, onward)         Ordered     IP VTE HIGH RISK PATIENT  Once         03/18/23 1620     Place sequential compression device  Until discontinued         03/18/23 1620     Place NIKITA hose  Until discontinued         03/18/23 1620                Discharge Planning   NISA:      Code Status: Full Code   Is the patient medically ready for discharge?:     Reason for patient still in hospital (select all that apply): Patient trending condition, Laboratory test, Treatment, Consult recommendations " and Pending disposition  Discharge Plan A: Home with family            Critical care time spent on the evaluation and treatment of severe organ dysfunction, review of pertinent labs and imaging studies, discussions with consulting providers and discussions with patient/family: 45 minutes.      Kelby Sargent MD  Department of Hospital Medicine   [facility] [place] - Intensive Care

## 2023-03-28 NOTE — SIGNIFICANT EVENT
This is a 73 year old male with a PMHx of SCC of tongue s/p tracheostomy & PEG, CAD, who presented with hemoptysis. He is admitted to ICU for mixed hemorrhagic/septic shock. He underwent a LHC via RFA with placement of 2 BRADLEY to Prox LAD and mid LCx. The right femoral sheath was pulled and he was noted to have a right groin hematoma with new back pain.     Additionally, patient developed worsening pressor requirements throughout the night. Hemoglobin dropped from 8.4 > 5.4.  A CT A/P showed moderate to large volume of left sided retroperitoneal hemorrhage as well as small volume of hemoperitoneum. Two units of PRBCs were ordered, with improvement of his Hb to 6.7 after the 1st unit.    Patient last received:   Aspirin 81 mg @918 on 3/27   Plavix 76 mg@918 on 3/27  Lovenox 40 mg @1756 on 3/27  Heparin drip was stopped @641 on 3/27  He received intra-operative/cath heparin; last on 1820    He also received vitamin K @2323 on 2/27  Last PT:13, INR: 1.2, PTT: 32.8 @2143 on 3/27    I was notified of above results by Dr. Tolentino, e-ICU. Plan to get a CTA A/P and contact IR if active extravasation is present.     4:17 AM  CTA resulted showing a left sided RP bleed, relatively stable with no active extravasation noted. A short term follow up CT as clinically warranted was reccommended.     Discussed with Cardiology, Dr Bhatt above results. Recommended continuing medical management.     Plan to transfuse to keep Hb >7, titrate pressors to maintain a MAP > 65. If worsening hemodynamics/worsening anemia, will repeat a CT scan.

## 2023-03-28 NOTE — CARE UPDATE
Pt remains in ICU. Pt hypotensive during beginning of the shift. Increased need for pressors. Titrated levophed to obtain map > 65. Pt complained of back pain. Unable to give pain meds because pt hypotensive. Called eICU MD. Critical hgb. No signs of active bleeding. 2 units of blood transfused. Went for CT and CTA of abdomen. See results. Straight cath 500 cc of urine. Pt now resting. Report given to day shift nurse. Skin intact. No new falls, injuries or skin breakdown. Updated pt and wife on plan of care.

## 2023-03-28 NOTE — PROGRESS NOTES
[facility] [place] [facility]  Cardiology  Progress Note    Patient Name: Fareed Richard Jr.  MRN: [id number]  Admission Date: 3/18/2023  Hospital Length of Stay: 9 days  Code Status: Full Code   Attending Physician: Kelby Sargent MD   Primary Care Physician: Kodi Tubbs MD  Expected Discharge Date:   Principal Problem:Hypotension    Subjective:     Hospital Course:   3/21/23 Developed increased SOB last PM. Troponin increased to 3. EKG with LBBB associated with sinus tachycardia that resolved when HR improved. Started on heparin which he has tolerated so far without further hemoptysis. Low dose tridil     3/22/23 Awake on tach collar. Denies CP. Less SOB. HCT continues to drop on heparin. No further hemoptysis. ST depression noted on telemetry. Good UOP to lasix 40 mg IV last PM. BNP 2543    3/23/23 LHC - EDP 13, distal LM 30%, mild LAD 90% bifurcation lesion with D1, Cx mid 80%, RCA moderate diffuse disease with long 70% and some left to right collateral. All vessels heavily calcified  Reviewed with Dr Malone - poor candidate for CABG. Will discuss staged PCI of LAD/Cx if able to tolerate DAPT without further pulmonary bleeding.    3/27: PCI prox LAD and mid LCx via R CFA.    Interval Hx: pt seen in ICU, case d/w Dr. Jimenez and RN at bedside.  Had back pain with drop in hgb overnight.  CTA noted LEFT RP hematoma (we accessed the R CFA for PCI yesterday).  He has responded nicely to resuscitation and transfusion.  No cp/sob/back pain.    Tele: SR 100s (personally reviewed and interpreted)          Review of Systems   Reason unable to perform ROS: trach.   Objective:     Vital Signs (Most Recent):  Temp: 97.8 °F (36.6 °C) (03/28/23 0408)  Pulse: 103 (03/28/23 0600)  Resp: (!) 37 (03/28/23 0600)  BP: 133/60 (03/28/23 0600)  SpO2: (!) 93 % (03/28/23 0600)   Vital Signs (24h Range):  Temp:  [95.8 °F (35.4 °C)-97.8 °F (36.6 °C)] 97.8 °F (36.6 °C)  Pulse:  [] 103  Resp:  [15-41] 37  SpO2:  [75 %-100 %] 93 %  BP:  ()/() 133/60  Arterial Line BP: (60-94)/(28-42) 94/42     Weight: 68 kg (150 lb)  Body mass index is 22.81 kg/m².     SpO2: (!) 93 %         Intake/Output Summary (Last 24 hours) at 3/28/2023 0630  Last data filed at 3/28/2023 0524  Gross per 24 hour   Intake 5666.52 ml   Output 725 ml   Net 4941.52 ml       Lines/Drains/Airways       Peripherally Inserted Central Catheter Line  Duration             PICC Triple Lumen 03/20/23 2145 right basilic 7 days              Drain  Duration                  Gastrostomy/Enterostomy 01/04/22 Percutaneous endoscopic gastrostomy (PEG)  days              Airway  Duration             Adult Surgical Airway 03/16/23 1014 Shiley Uncuffed 6.0 11 days              Peripheral Intravenous Line  Duration                  Peripheral IV - Single Lumen 03/20/23 1825 22 G Anterior;Right Forearm 7 days                  Examunchanged vs 3/25/23  Physical Exam  Constitutional:       General: He is not in acute distress.     Appearance: He is well-developed. He is ill-appearing. He is not toxic-appearing or diaphoretic.   HENT:      Head: Normocephalic and atraumatic.   Eyes:      General: No scleral icterus.     Extraocular Movements: Extraocular movements intact.      Conjunctiva/sclera: Conjunctivae normal.      Pupils: Pupils are equal, round, and reactive to light.   Neck:      Thyroid: No thyromegaly.      Vascular: No JVD.      Trachea: No tracheal deviation.      Comments: Trach in place  Cardiovascular:      Rate and Rhythm: Regular rhythm. Tachycardia present.      Pulses:           Radial pulses are 2+ on the right side.      Heart sounds: S1 normal and S2 normal. Heart sounds are distant. No murmur heard.    No friction rub. No gallop.      Comments: R rad and RFA access site c/d/i  Pulmonary:      Effort: Pulmonary effort is normal. No respiratory distress.      Breath sounds: Normal breath sounds. No stridor. No wheezing, rhonchi or rales.   Chest:      Chest wall:  No tenderness.   Abdominal:      General: There is no distension.      Palpations: Abdomen is soft.   Musculoskeletal:         General: No swelling or tenderness. Normal range of motion.      Cervical back: Normal range of motion and neck supple. No rigidity.      Right lower leg: No edema.      Left lower leg: No edema.   Skin:     General: Skin is warm and dry.      Coloration: Skin is not jaundiced.   Neurological:      General: No focal deficit present.      Mental Status: He is oriented to person, place, and time.      Cranial Nerves: No cranial nerve deficit.   Psychiatric:         Mood and Affect: Mood normal.         Behavior: Behavior normal.       Current Medications:   aspirin  81 mg Oral Daily    atorvastatin  80 mg Per G Tube Daily    clopidogreL  75 mg Oral Daily    ferrous sulfate  1 tablet Per G Tube Daily    furosemide  40 mg Per G Tube BID    glycopyrrolate  1 mg Per G Tube TID    LIDOcaine  2 patch Transdermal Q24H    melatonin  9 mg Per G Tube Nightly    metoprolol tartrate  25 mg Per G Tube BID    polyethylene glycol  17 g Oral BID    sodium chloride 0.9%  10 mL Intravenous Q6H      dexmedeTOMIDine (Precedex) infusion (titrating) Stopped (03/24/23 1100)    nitroGLYCERIN      NORepinephrine bitartrate-D5W 0.2 mcg/kg/min (03/28/23 0519)    sodium chloride 0.9% 250 mL/hr at 03/28/23 0519     sodium chloride, sodium chloride, sodium chloride, acetaminophen, acetaminophen, acetaminophen, albuterol-ipratropium, atropine, fentaNYL, guaiFENesin 100 mg/5 ml, HYDROcodone-acetaminophen, HYDROcodone-acetaminophen, hydrOXYzine HCL, insulin aspart U-100, morphine, naloxone, nitroGLYCERIN, nitroGLYCERIN, nitroGLYCERIN, ondansetron, ondansetron, oxyCODONE, sodium chloride 0.9%, sodium chloride 0.9%, Flushing PICC Protocol **AND** sodium chloride 0.9% **AND** sodium chloride 0.9%    Laboratory (all labs reviewed):  CBC:  Recent Labs   Lab 03/24/23  0357 03/25/23  0429 03/26/23  0357 03/27/23  0355  03/27/23 2038 03/28/23 0205 03/28/23 0427   WBC 9.55 9.74 8.76 11.80  --   --  22.23 H   Hemoglobin 7.8 L 8.2 L 8.3 L 8.4 L 5.4 LL 6.7 L 7.4 L   Hematocrit 24.0 L 25.5 L 26.0 L 26.8 L  --   --  23.5 L   Platelets 217 245 233 301  --   --  309       CHEMISTRIES:  Recent Labs   Lab 05/10/22  1031 05/11/22  0412 05/12/22  0343 05/14/22  0724 05/17/22  1007 08/24/22  1000 03/22/23  0233 03/23/23  0332 03/24/23 0357 03/25/23 0429 03/26/23 0357 03/27/23 0355 03/28/23 0205 03/28/23 0427   Glucose 130 H 103 110 96 162 H   < > 181 H 178 H  --   --   --  139 H 153 H 159 H   Sodium 140 139 135 L 136 133 L   < > 137 135 L  --   --   --  141 140 138   Potassium 4.6 5.0 4.4 4.1 5.0   < > 4.2 3.8  --   --   --  3.4 L 4.4 4.4   BUN 22 26 H 24 H 18 15   < > 24 H 33 H  --   --   --  38 H 42 H 44 H   Creatinine 0.8 0.7 0.7 0.7 0.7   < > 0.8 0.7  --   --   --  0.9 1.3 1.3   eGFR if non African American >60.0 >60.0 >60.0 >60 >60.0  --   --   --   --   --   --   --   --   --    eGFR  --   --   --   --   --    < > >60 >60  --   --   --  >60 58 A 58 A   Calcium 9.7 9.7 9.1 9.6 9.5   < > 8.1 L 8.0 L  --   --   --  8.6 L 8.0 L 8.0 L   Magnesium  --  2.3 2.0  --  2.0   < > 1.9 2.1 2.2 2.4 2.3  --   --   --     < > = values in this interval not displayed.       CARDIAC BIOMARKERS:  Recent Labs   Lab 09/14/22  0459 03/18/23  1106 03/19/23  1308 03/19/23  1840 03/20/23  1642   Troponin I 0.010 0.010 1.736 H 2.222 H 3.416 H       COAGS:  Recent Labs   Lab 01/12/22  0510 09/14/22  0641 03/18/23  1106 03/20/23  1744 03/27/23  2143   INR 0.9 1.0 1.0 1.0 1.2       LIPIDS/LFTS:  Recent Labs   Lab 09/23/22  1247 09/30/22  0317 11/23/22  0733 03/18/23  1106 03/19/23  0413   AST 13 16 17 16 22   ALT 13 16 14 16 15       BNP:  Recent Labs   Lab 05/14/22  0725 09/14/22  0459 09/23/22  1247 03/18/23  1106 03/21/23  1525   BNP 98 185 H 468 H 136 H 2,543 H       TSH:  Recent Labs   Lab 02/16/22  1157 08/24/22  1000 11/23/22  0733 01/24/23  1105  03/19/23  1308   TSH 1.332 2.279 4.924 H 4.861 H 2.787       Free T4:  Recent Labs   Lab 11/23/22  0733 01/24/23  1105   Free T4 0.86 0.90       Diagnostic Results:  CTA A/P 3/28/23 (images personally reviewed and interpreted)  1.  Redemonstration of left-sided retroperitoneal hemorrhage of relatively stable and volume and distribution compared to prior examination.  No jamil extravasation of IV contrast material appreciated on the arterial phase or definitive pooling of contrast material on the delayed phase.  There is a subtle punctate focal hyperdensity only appreciated on the delayed phase which is favored to represent artifact, although small focus of venous hemorrhage would be difficult to exclude.  Consider short-term follow-up repeat CT as clinically warranted.  2.  Extensive atherosclerotic calcification of the abdominal aorta and major branch vessels.  There is apparent occlusion of the partially visualized right SFA.  Additionally, there are suspected hemodynamically significant stenosis involving the bilateral renal arteries.  Please see above for additional details.  3.  Redemonstration of persistent bilateral renal nephrograms with wedge-shaped regions of hypoattenuation predominantly involving the upper poles which may relate to evolving renal infarcts.  4.  Nonspecific urinary bladder wall thickening.  Moderate urinary bladder distension.    PCI LAD/LCx 3/27/23 (images personally reviewed and interpreted)  LVEDP: 3mmHg  LVEF: 60%     Aortic arch: severe calcific atherosclerosis  Ost innominate artery 80%  Prox R SCA 90%, 53mmHg gradient across stenoses.  Prox-mid ICA patent  Distal aortoiliac bifurcation with patent stents in [place] bilaterally and possible severe ISR in R ЮЛ[place].     Severe diffuse cor Ca++  Dominance: Right  LM: MLI  LAD: prox 95% at D1, mid   Ramus: MLI  LCx: mid 90%  RCA: not injected, diffuse disease, severe dist RCA stenosis (see Dr. Benitez cath report     PCI prox LAD:  Preop  ASA/Plavix, intraop heparin  Wired LAD/D1  Predil 2.5x12, 2.75x15 NC  Stent 3.0x24 Synergy BRADLEY  Post IVUS with mild underexpansion mid stent  Postdil 3.25x20 NC at 22atm  Excellent angiographic result, T3 flow, 0% residual stenosis  PCI mid LCx  Predil 2.5x12  Stent 2.75x20 Synergy BRADLEY 14 fidelia  Unavble to pass IVUS due to severe prox LCx tortuosity  Excellent angiographic result, T3 flow, 0% residual stenosis  Hemostasis:  R Radial band  RFA man comp  Impression:  NSTEMI with ongoing unstable sxs  Severe Ca++ 3V CAD, normal LVx  Severe PAD with severe innominate and prox R SCA stenoses and R ЮЛИЯ ISR.  Successful MV PCI:  Prox LAD 3.0x24 Synergy BRADLEY  Mid LCx 2.75x20 Synergy BRADLEY  RFA man comp  R rad vasband  Plan:  Cont med rx  ASA 81mg qd indefinitely  Plavix for minimum 1 year (thru 3/2024). If absolutely necessary, DAPT can be stopped in 30 day (4/27/23).  Would consider prolonged rx if pt tolerates.    Statin  Dispo planning as per ICU team  Follow up with Dr. Bhatt    Echo: 3/20/23   The left ventricle is normal in size with concentric hypertrophy and normal systolic function.   The estimated ejection fraction is 60%. Inferobasal hypokinesis   Indeterminate left ventricular diastolic function.   Normal right ventricular size with normal right ventricular systolic function.   Mild left atrial enlargement.   Mild right atrial enlargement.   Normal central venous pressure (3 mmHg).   The estimated PA systolic pressure is 13 mmHg.   The sinuses of Valsalva is mildly dilated. 4.2cm.    Cath: 3/23/23 (images personally reviewed and interpreted)  3/23/23 [facility] - EDP 13, distal LM 30%, mild LAD 90% bifurcation lesion with D1, Cx mid 80%, RCA moderate diffuse disease with long 70% and some left to right collateral. All vessels heavily calcified  Reviewed with Dr Malone - poor candidate for CABG. Will discuss staged PCI of LAD/Cx if able to tolerate DAPT without further pulmonary bleeding      Assessment and Plan:      * Hypotension  Had LEFT RP bleed overnight  Hypotension resolved.  NIBP with SBP 130s, probably closer to 160 given severe PAD and gradient between central pressure (approx 30mmHg) and NIBP during cath on 3/27/23.  Responded well to transfusion and norepi, weaning off.  Cont DAPT.  Monitor CBC closely.    Squamous cell cancer of tongue  Per IM    Retroperitoneal hematoma  As per CAD section    NSTEMI (non-ST elevated myocardial infarction)  As per CAD section    Coronary artery disease involving native coronary artery of native heart with unstable angina pectoris  Remote Hx PCI to RCA 2000. Troponin up to 2.2. Denies CP. EKG with mild NSSTT changes. Likely demand ischemia from hypotension and anemia. Poor candidate for anticoagulation or LHC with high bleeding risk and hemoptysis. Check echo - if stable ok for out patient ischemic evaluation when able to tolerate anticoagulation  3/21/23 Troponin increased to 3. Had rate related LBBB that has since resolved. Denies CP. EF 60% on echo with inferobasal HK. Tolerating heparin so far.   3/23/23: cath with prox LAD/D1 (?culprit), LCx and RCA stenoses.  Poor surgical candidate.    3/24/23: no further CP.  Tolerating DAPT.  Cont statin.  Will resume heparin IV.  Will discuss possible high risk PCI with wife when she arrives.    3/25/23: discussed with wife yesterday.  Cath permit signed.      3/27/23: PCI of prox LAD and LCx.    NSTEMI with ongoing unstable sxs  Severe Ca++ 3V CAD, normal LVx  Severe PAD with severe innominate and prox R SCA stenoses and R ЮЛИЯ ISR.  Successful MV PCI:  Prox LAD 3.0x24 Synergy BRADLEY  Mid LCx 2.75x20 Synergy BRADLEY  RFA man comp  R rad vasband  Plan:  Cont med rx  ASA 81mg qd indefinitely  Plavix for minimum 1 year (thru 3/2024). If absolutely necessary, DAPT can be stopped in 30 day (4/27/23).  Would consider prolonged rx if pt tolerates.    Statin  Dispo planning as per ICU team  Follow up with Dr. Bhatt          Hemoptysis  Per  pulmonary. No further bleeding noted.  Tolerating DAPT    Primary hypertension  Cont med rx as tolerated.    Other hyperlipidemia  Cont atorva 80mg  Check lipids/LFT 6 months (Sept 2023)    Acute blood loss anemia  LEFT RP bleed after LHC/PCI on 3/27/23 (with R CFA access).  As above  HGB stable    Acute on chronic respiratory failure with hypoxia  Mgmt per IM/pulm    Tracheostomy present  Per ENT/pulm    Elevated brain natriuretic peptide (BNP) level  Does not appear grossly vol overloaded at present.        VTE Risk Mitigation (From admission, onward)         Ordered     IP VTE HIGH RISK PATIENT  Once         03/18/23 1620     Place sequential compression device  Until discontinued         03/18/23 1620     Place NIKITA hose  Until discontinued         03/18/23 1620              Pt seen in ICU, critical care time 45min.    Tim Bhatt MD  Cardiology  [facility] - [place] - Intensive Care

## 2023-03-28 NOTE — EICU
BP 64/41 on norepinephrine infusion at 0.34 mcg/kg/minute    Patient receiving blood now.  PT 13/APTT32.8    Unable to transfer to CT as he is unstable.    Plan:  Vitamin K 10 mg IVPB over 1 hour once.  Titrate norepinephrine infusion for SBP greater than 90 mm of HG.

## 2023-03-28 NOTE — ASSESSMENT & PLAN NOTE
Had LEFT RP bleed overnight  Hypotension resolved.  NIBP with SBP 130s, probably closer to 160 given severe PAD and gradient between central pressure (approx 30mmHg) and NIBP during cath on 3/27/23.  Responded well to transfusion and norepi, weaning off.  Cont DAPT.  Monitor CBC closely.

## 2023-03-28 NOTE — SUBJECTIVE & OBJECTIVE
Interval History: Overnight pt went into shock and found to have L retroperitoneal hematoma    Review of Systems   Constitutional:  Negative for chills and fever.   Respiratory:  Negative for cough and shortness of breath.    Cardiovascular:  Negative for chest pain and leg swelling.   Gastrointestinal:  Positive for nausea. Negative for abdominal distention and abdominal pain.   Objective:     Vital Signs (Most Recent):  Temp: 98.8 °F (37.1 °C) (03/28/23 1530)  Pulse: 106 (03/28/23 1545)  Resp: (!) 22 (03/28/23 1545)  BP: (!) 104/54 (03/28/23 1545)  SpO2: (!) 86 % (03/28/23 1545)   Vital Signs (24h Range):  Temp:  [95.8 °F (35.4 °C)-98.8 °F (37.1 °C)] 98.8 °F (37.1 °C)  Pulse:  [] 106  Resp:  [20-43] 22  SpO2:  [75 %-100 %] 86 %  BP: ()/() 104/54  Arterial Line BP: (89-94)/(40-42) 94/42     Weight: 68 kg (150 lb)  Body mass index is 22.81 kg/m².    Intake/Output Summary (Last 24 hours) at 3/28/2023 1606  Last data filed at 3/28/2023 1548  Gross per 24 hour   Intake 6106.52 ml   Output 575 ml   Net 5531.52 ml      Physical Exam  Constitutional:       General: He is not in acute distress.     Appearance: He is ill-appearing. He is not toxic-appearing or diaphoretic.   Cardiovascular:      Rate and Rhythm: Normal rate and regular rhythm.      Heart sounds: No murmur heard.    No gallop.   Pulmonary:      Comments: Diffuse coarse sounds at b/l lung bases. Trach in place  Abdominal:      General: Bowel sounds are normal. There is no distension.      Palpations: Abdomen is soft.      Tenderness: There is no abdominal tenderness.       Significant Labs: All pertinent labs within the past 24 hours have been reviewed.    Significant Imaging: I have reviewed all pertinent imaging results/findings within the past 24 hours.

## 2023-03-28 NOTE — NURSING
Patient sitting up in bed watching tv- map 60- mentation wnl- Dr Sargent aware of map of 60 and remains off Levo

## 2023-03-28 NOTE — ASSESSMENT & PLAN NOTE
"-Denies chest pain but on 3/19 did have unexplained hypotension with severe back/shoulder pain  -Troponin on admit normal, but on 3/19 bumped to 1.7 and then 2.2 on 3/20.  -No jamil ischemic change on EKG  -Echo showed EF 60%, inferobasal hypokinesis, indeterminate diastolic function  -Cardiology consulted and input appreciated  -3/19 - Cardiology suspects this is due to demand ischemia associated with his anemia and hypotension.  Further, he is a poor candidate for angiogram given his hemoptysis and inability to treat with blood thinning medications.  Recommended outpatient stress testing  -Holding aspirin given hemoptysis and metoprolol given hypotension  -Continue statin.  Resume BB as able. On nitro drip for chest pain. - having difficulty weaning off. He is currently cleared for DAPT as no further bleeding issues at this time. Updated Cardiology.   -Patient underwent LHC/angiogram on 2/23 - findings below.  "3/23/23 LHC - EDP 13, distal LM 30%, mild LAD 90% bifurcation lesion with D1, Cx mid 80%, RCA moderate diffuse disease with long 70% and some left to right collateral. All vessels heavily calcified     Reviewed with Dr Malone - poor candidate for CABG. Will discuss staged PCI of LAD/Cx if able to tolerate DAPT without further pulmonary bleeding"    - Currently on ASA/Plavix and low dose heparin infusion. Cardiology discussing intervention with patient and wife. Plans for LHC on Monday 3/27.    Underwent LHC w/ LCx and LAD stents      "

## 2023-03-28 NOTE — ASSESSMENT & PLAN NOTE
Presented with hypotension and bleeding from tracheostomy/hemoptysis.  Patient does have lower blood pressure readings however this blood pressure was concerning in the 70s.  Patient did have some bleeding but did not suspect it was hemorrhagic.  Also thought to be septic due to possible UTI/pneumonia and being treated for antibiotics.  Also concerns for cardiogenic with an NSTEMI and being on nitro.  He was treated appropriately with antibiotics for his UTI/pneumonia and was also treated for NSTEMI.  Intermittently requiring pressor support with sedation after he was put on the ventilator.  Now on trach collar.  Hypotension has now resolved and he is doing well.    Pt went into hemorrhagic shock on 3/27 w/ L RP bleed. No evidence of continued bleeding on CTA. Levophed weaned.

## 2023-03-28 NOTE — ASSESSMENT & PLAN NOTE
"Palliative Encounter  Date: 3/28/2023  - interval chart reviewed in detail; discussed pt during ICU MDT rounds   - pt with improved anxiety today despite what he reports as a "rough night" (referring to retroperitoneal bleed, see that section)   - me with pt and his wife; continued GOC for full treatment; good insight into potential for additional bleeding due to need to continue anticoagulation   - encouraged continued discussion of code status with wife and home palliative team; hoping code status discussion may be less stressful for pt to discuss in less acute setting   - good insight into risks/outcomes associated with aggressive resuscitation interventions if needed while full code   - continued emotional support provided; allowed time for questions/concerns  - ongoing communication with MDT     Palliative Encounter  Date: 3/27/2023  - interval chart reviewed in detail; discussed pt with ICU MDT Rounds   - pt observed to be very anxious/restless during rounds; discussed administering hydroxizine with KAMILAH Doran at that time and immediately following rounds met with pt and wife (see anxiety)  - ongoing discussion of anxiety management; plan to further address post cath lab procedures, including addition of SSRI and more long term management   - discussed planned procedure today; pt continues to be agreeable to any offered procedures to improve condition   - emotional support provided to pt and wife; allowed time for questions/concerns      Palliative Encounter  Date: 3/23/2023  - interval chart reviewed in detail; discussed pt with ICU MDT Rounds   - pt very lethargic today post precedex (see anxiety); met with wife at bedside and discussed overnight events; wife does not wish for pt to receive ativan again in the future  - continue GOC/ACP discussion; awaiting discussing cardiac intervention options with cardiology   - wife continues to support pt's wishes for full treatment; she plans to take leave from work " at time of discharge to care for pt     - re-discussed continued OP follow up with palliative medicine recommendation; wife prefers home palliative as opposed to clinic   - will discuss with CM/SW home palliative information session coordination with a hospice provider that also has hospice for future continuity of care; pt initially mistrusting of palliative medicine but is now agreeable; feel building a relationship with a palliative provider will help to establish trust for future care needs if/when hospice would be needed   - emotional support provided to wife; answered all questions   - discussed with hospital primary     Palliative Encounter  Date: 3/23/2023  - interval chart reviewed in detail; discussed pt with ICU MDT Rounds   - met with pt and wife at bedside for planned GOC/ACP discussion; pt with improved calmness related to this discussion; reassured pt at anytime if there was a topic he did not wish to discuss, or wished to stop meeting to let me know   - introduction to palliative medicine and role in current/future care provided to pt's wife  - discussed previously stated goals (as discussed with Dr. Escoto); pt confirms confirmed continued desire for full code and full treatment; reassured these wishes are and will be respected; encouraged that if at anytime those wishes were to change that it could be discussed further at that time   - allowed pt and wife to share long road of cancer treatment and how this affects current GOC   - plan now in place for cardiac cath procedure today; briefly discussed  - discussed pt's symptoms of anxiety; wife also provided concerns of pt having a more depressed mood; validated anxiety and depression symptoms given pt's condition and other symptoms; discussed correlation of anxiety and respiratory symptoms; discussed options for anxiety management (see anxiety)   - pt's wife shared pt's fear of requiring a nursing home as likely source of anxiety related to previous  visits; pt advocates that he would never wish to live in a  NH; wife is primary care giver and confirms they have a lot of equipment at home to help care for pt, and she is well versed on his care needs; bedside RN shared wife's concern for needed feeding supplies and requesting CM/SW assistance prior to discharge; CM/SW consult placed   - recommended home palliative or palliative clinic for continued palliative support, symptom management, and ACP discussions; they are agreeable to this; plan for follow up to determine if home palliative or clinic preferred  - emotional support provided; allowed time for questions/concerns, all addressed   - hospital primary updated    Palliative Consult  Date: 3/22/2023 (date corrections to initial consult)   - consult received; interval chart reviewed in detail; discussed pt during ICU MDT rounds   - met with patient at ICU bedside; introduction to palliative medicine team and role in current care and admission  - initial consult on 3/21, but pt's condition did not allow for GOC discussion, post poned as to not cause increased anxiety and respiratory compromise; previous detailed GOC/ACP discussion with, then primary, Dr. Escoto   - Assessed pt and discussed plan to have GOC/ACP discussion with wife present later in the day  - as time for planned discussion neared, pt became very anxious of pending discussion; discussed role of palliative and assured that I do not wish to cause pt anxiety about discussions, and he can request to not discuss at all; explained my intentions of meeting and that I would not try to persuade him into any plans that do not align with his GOC   - validated all the hard work and things pt has overcome in his cancer treatment   - pt seemed very relieved by above discussion and agreeable for further meeting on 3/23, with wife present   - emotional support provided; allowed time for questions/concerns, all addressed   - MDT updated, and ongoing communication  with MDT

## 2023-03-28 NOTE — NURSING
Patient resting in bed watching tv - no complaints of abdominal or back pain - abdomen is soft- sore - has not voided since cath done - right groin dressing dry and intact since changed - pulses present

## 2023-03-28 NOTE — EICU
BP 80/43,  On norepinephrine infusion at 0.26mcg/kg/minute    Right groin hematoma noted.  Patient complains of back pain in addition.    Plan:  Ordered HB stat.  Ordered LR 1L bolus.  Ordered norepinephrine infusion at higher concentration at 8 mg in 250 ml.

## 2023-03-28 NOTE — ASSESSMENT & PLAN NOTE
- left flank/lower back pain reported 3/27  - CTA early AM 3/28 showing right retroperitoneal bleed   - ongoing concern for pt's inability to tolerate anticoagulation without episodes of bleeding; admitted with hemoptysis which is now resolved   - noted; management per hospital primary

## 2023-03-28 NOTE — NURSING
While patient was sitting upright tolerating feedings - once completed and flushed - coughed and tube feeds came out of the trach - suctioned and saturation remains in the high 80's Dr Sargent at the bedside - wife present - states that he does vomit at home at times when he is too full - No new orders will continue to monitor

## 2023-03-28 NOTE — EICU
Suspecting RPH . Hg 5.4s/p LHC.    1/2 PRBC finishing.  No active GI bleeding anywhere.   Decreased urine out put.  PT/PTT/INR ok    Discussed with RN.     Camera :  Has a trach collar on.   On levophed 0.26 mcg/kg/min.     - transfuse. Continue levophed.   Stat BMP  - CT abdomen /pelvis w/o contrast start. If + for RPH, then need IR/CTA abdomen.  - repeat Hg again to confirm.    3;09  ED radiologist called via secure chat:  has a large left sided retroperitoneal hemorrhage, relatively unorganized. Some mass effect and anterior displacement of the left kidney. Also has persistent nephorgrams presumably from underlying kidney disease however some areas suspicious for renal infarcts.     Stable Vitals:  on Camera, Hg post 1 PRBC up to 6.7 from 5.5 appropriately  Getting second unit.    Bladder scan showed > 500 retention. Getting a straight cath.    Discussed with Dr Hernandez .    Will get a stat CTA abd, if active bleeding to call IR for intervention.   Cr 1.3 /      4:23  CTA:  No active arterial bleed. ? Venous oozing or articat as below:    Impression:     1.  Redemonstration of left-sided retroperitoneal hemorrhage of relatively stable and volume and distribution compared to prior examination.  No jamil extravasation of IV contrast material appreciated on the arterial phase or definitive pooling of contrast material on the delayed phase.  There is a subtle punctate focal hyperdensity only appreciated on the delayed phase which is favored to represent artifact, although small focus of venous hemorrhage would be difficult to exclude.  Consider short-term follow-up repeat CT as clinically warranted.     2.  Extensive atherosclerotic calcification of the abdominal aorta and major branch vessels.  There is apparent occlusion of the partially visualized right SFA.  Additionally, there are suspected hemodynamically significant stenosis involving the bilateral renal arteries.  Please see above for additional details.     3.   Redemonstration of persistent bilateral renal nephrograms with wedge-shaped regions of hypoattenuation predominantly involving the upper poles which may relate to evolving renal infarcts.     4.  Nonspecific urinary bladder wall thickening.  Moderate urinary bladder distension.       For now continue current care.  Follow Hg/hct, creatinine levels.  If worsening  or unstable hemodynamically, to repeat scan  Nephrology consultation in AM    Consider Vascular consult     4:43  Discussed with Dr Hernandez. He has already notified Cardiology. Continue current care.

## 2023-03-28 NOTE — EICU
Hemoglobin 5.4<-8.4    Plan:  2 units PRBC transfusion now(had received transfusion on 3/18)  Check APTT/PT  CT abdomen and pelvis to r/o retroperitoneal  bleed.

## 2023-03-28 NOTE — CARE UPDATE
[facility]  Nurses Note -- 4 Eyes      3/28/2023       Skin assessed on: Q Shift      [x] No Pressure Injuries Present    [x]Prevention Measures Documented    [] Yes LDA  for Pressure Injury Previously documented     [] Yes New Pressure Injury Discovered   [] LDA for New Pressure Injury Added      Attending RN:  Yadi Fenton RN     Second RN:  Fidelia Carmen RN

## 2023-03-28 NOTE — PLAN OF CARE
DC NEEDS ASSESSMENT:    Discharge plan at this time:  1) Home with HH  2)  Home with hospice/ Palliative care  Barriers to DC:  none    CM spoke with patient's wife about DC.  Will discuss with [facility] if Jevity is available for TF once patient is ready for DC.    CM will continue to follow and finalize plans

## 2023-03-28 NOTE — ASSESSMENT & PLAN NOTE
-Hb on admit 7.6 and this morning 8.0  -Baseline Hb 8-9.  -Presented w intermittent hemoptysis via trach x 2 days. Had another episode on morning of 3/19/2023  -Seen at ENT 2 days prior to admit (Dr. Smalls) with exam without source. Trache exchanged at that time.   -Ferritin only 84 and Tsat 18% - consistent with iron deficiency  -Platelets and PT/INR are normal.  -Treated with TXA nebulizer and bleeding has stopped  -Started FeSO4 which he will need at discharge  -Repeat cbc in AM. Hb 7.7 but stable, no further evidence of bleeding. Again 7.1 but no further bleeding.   Stable at 8.2 - 8.3  - no further episodes of bleeding and tolerating heparin/plavix    Pt developed L sided RP hematoma. He was having L-sided pain prior to C suggesting this was not the culprit. Went into shock which resolved with transfusion  - close monitoring

## 2023-03-28 NOTE — ASSESSMENT & PLAN NOTE
Remote Hx PCI to RCA 2000. Troponin up to 2.2. Denies CP. EKG with mild NSSTT changes. Likely demand ischemia from hypotension and anemia. Poor candidate for anticoagulation or LHC with high bleeding risk and hemoptysis. Check echo - if stable ok for out patient ischemic evaluation when able to tolerate anticoagulation  3/21/23 Troponin increased to 3. Had rate related LBBB that has since resolved. Denies CP. EF 60% on echo with inferobasal HK. Tolerating heparin so far.   3/23/23: cath with prox LAD/D1 (?culprit), LCx and RCA stenoses.  Poor surgical candidate.    3/24/23: no further CP.  Tolerating DAPT.  Cont statin.  Will resume heparin IV.  Will discuss possible high risk PCI with wife when she arrives.    3/25/23: discussed with wife yesterday.  Cath permit signed.      3/27/23: PCI of prox LAD and LCx.    NSTEMI with ongoing unstable sxs  Severe Ca++ 3V CAD, normal LVx  Severe PAD with severe innominate and prox R SCA stenoses and R ЮЛИЯ ISR.  Successful MV PCI:  Prox LAD 3.0x24 Synergy BRADLEY  Mid LCx 2.75x20 Synergy BRADLEY  RFA man comp  R rad vasband  Plan:  Cont med rx  ASA 81mg qd indefinitely  Plavix for minimum 1 year (thru 3/2024). If absolutely necessary, DAPT can be stopped in 30 day (4/27/23).  Would consider prolonged rx if pt tolerates.    Statin  Dispo planning as per ICU team  Follow up with Dr. Bhatt

## 2023-03-28 NOTE — ASSESSMENT & PLAN NOTE
-Patient with acute on chronic hypoxic respiratory failure  -At home is on 5L O2 via trach and O2 sats typically in high 80s low 90s  -Sats presently similar, but is having episodes of hemoptysis  -Acute etiology is likely due to pneumonia, mucus plugging and hemoptysis.  -Consulted pulm-critical care and input appreciated  -Discussed with ENT on call at [facility] 3/19 and OK to keep at [facility]. Dr. French evaluated.  -Continue antibiotics as above.  -Continue supplemental O2 via Trach and wean as able. Placed on mechanical ventilation on 3/21 for respiratory distress and increasing oxygen requirements.  Slowly weaning at this time.  Appears more comfortable today. Weaned off vent on 3/23. No need for ventilator at night.  - Currently on 10 liters trach collar

## 2023-03-28 NOTE — NURSING
Patient requesting some medicine for his bowels - gets frustrated when I am unable to understand him - no further nausea noted will continue to monitor

## 2023-03-28 NOTE — ASSESSMENT & PLAN NOTE
- cardiology consulted and following   - Echo showed EF 60%, inferobasal hypokinesis, indeterminate diastolic function  - nitro drip weaned; pt denying any continued angina  - cardiac cath procedure on 3/23; per cardiology poor CABG candidate; 3/27 PCI of prox LAD and LCx  - pt previously agreeable to any recommended cardiac procedures at this time   - 3/28 retroperitoneal hematoma; ongoing concern for pt's ability to tolerate anticoagulation and risk of limiting anti-coag on cardiac recovery   - noted; management per hospital primary, and Cardiology

## 2023-03-28 NOTE — SUBJECTIVE & OBJECTIVE
Past Medical History:   Diagnosis Date    Cancer     skin to Rt forearm and face    COPD (chronic obstructive pulmonary disease)     Hyperlipidemia     Hypertension     Pseudoaneurysm      Past Surgical History:   Procedure Laterality Date    ABDOMINAL SURGERY      stents placed in liver and large intestines, per patient    ANGIOGRAPHY OF AORTIC ARCH  3/27/2023    Procedure: Aortogram, Aortic Arch;  Surgeon: Tim Bhatt MD;  Location: [facility] CATH LAB;  Service: Cardiology;;    AORTOGRAPHY WITH SERIALOGRAPHY N/A 3/27/2023    Procedure: AORTOGRAM, WITH SERIALOGRAPHY;  Surgeon: Tim Bhatt MD;  Location: [facility] CATH LAB;  Service: Cardiology;  Laterality: N/A;    CAROTID STENT      COLONOSCOPY N/A 09/27/2022    Procedure: COLONOSCOPY;  Surgeon: oDnnie Peterson MD;  [facility]  Service: Endoscopy;  Laterality: N/A;    COLONOSCOPY N/A 09/30/2022    Procedure: COLONOSCOPY;  Surgeon: Joy Cabrera MD;  [facility]  Service: Endoscopy;  Laterality: N/A;    CORONARY STENT PLACEMENT  01/2000    DISSECTION OF NECK Bilateral 01/04/2022    Procedure: DISSECTION, NECK;  Surgeon: Naeem Smalls MD;  [facility];  Service: ENT;  Laterality: Bilateral;    ESOPHAGOGASTRODUODENOSCOPY N/A 09/27/2022    Procedure: EGD (ESOPHAGOGASTRODUODENOSCOPY);  Surgeon: Donnie Peterson MD;  [facility]  Service: Endoscopy;  Laterality: N/A;    ESOPHAGOGASTRODUODENOSCOPY N/A 09/30/2022    Procedure: EGD (ESOPHAGOGASTRODUODENOSCOPY);  Surgeon: Joy Cabrera MD;  Location: [facility] ENDO (2ND FLR);  Service: Endoscopy;  Laterality: N/A;    ESOPHAGOGASTRODUODENOSCOPY W/ PEG N/A 01/04/2022    Procedure: EGD, WITH PEG TUBE INSERTION;  Surgeon: Anthony Calabrese MD;  [facility];  Service: General;  Laterality: N/A;    EYE SURGERY      Cataract bilateral    femoral stents      bilateral    FLAP PROCEDURE N/A 01/03/2022    Procedure: CREATION, FREE FLAP;   Surgeon: Naeem Smalls MD;  Location: [facility] FLR;  Service: ENT;  Laterality: N/A;    FLAP PROCEDURE Right 01/04/2022    Procedure: CREATION, FREE FLAP;  Surgeon: Stacey Conde MD;  Location: [facility] OR [facility] FLR;  Service: ENT;  Laterality: Right;  Ischemic start 1203  Ischemic stop 1353    GLOSSECTOMY N/A 01/04/2022    Procedure: GLOSSECTOMY;  Surgeon: Naeem Smalls MD;  Location: [facility] FLR;  Service: ENT;  Laterality: N/A;    PERCUTANEOUS TRANSLUMINAL BALLOON ANGIOPLASTY OF CORONARY ARTERY Left 3/27/2023    Procedure: Angioplasty-coronary;  Surgeon: Tim Bhatt MD;  Location: [facility] CATH LAB;  Service: Cardiology;  Laterality: Left;  not before 9am, R rad access, 6 Fr EBU 3.5 guide    RADICAL NECK DISSECTION Left 01/03/2022    Procedure: DISSECTION, NECK, RADICAL;  Surgeon: Naeem Smalls MD;  [facility];  Service: ENT;  Laterality: Left;    SKIN BIOPSY      SKIN CANCER EXCISION      STENT, CORONARY, MULTI VESSEL  3/27/2023    Procedure: Stent, Coronary, Multi Vessel;  Surgeon: Tim Bhatt MD;  Location: [facility] CATH LAB;  Service: Cardiology;;    TRACHEOTOMY N/A 01/04/2022    Procedure: TRACHEOTOMY;  Surgeon: Naeem Smalls MD;  [facility];  Service: ENT;  Laterality: N/A;    VASCULAR SURGERY       Review of patient's allergies indicates:   Allergen Reactions    Ativan [lorazepam] Anxiety       Medications:  Continuous Infusions:   dexmedeTOMIDine (Precedex) infusion (titrating) Stopped (03/24/23 1100)    nitroGLYCERIN      NORepinephrine bitartrate-D5W 0.2 mcg/kg/min (03/28/23 0519)    sodium chloride 0.9% 250 mL/hr at 03/28/23 0519     Scheduled Meds:   aspirin  81 mg Oral Daily    atorvastatin  80 mg Per G Tube Daily    clopidogreL  75 mg Oral Daily    ferrous sulfate  1 tablet Per G Tube Daily    furosemide  40 mg Per G Tube BID    glycopyrrolate  1 mg Per G Tube TID    LIDOcaine  2 patch Transdermal Q24H    melatonin  9 mg Per G Tube  Nightly    metoprolol tartrate  25 mg Per G Tube BID    polyethylene glycol  17 g Oral BID    sodium chloride 0.9%  10 mL Intravenous Q6H     PRN Meds:sodium chloride, sodium chloride, sodium chloride, acetaminophen, acetaminophen, acetaminophen, albuterol-ipratropium, atropine, fentaNYL, guaiFENesin 100 mg/5 ml, HYDROcodone-acetaminophen, HYDROcodone-acetaminophen, hydrOXYzine HCL, insulin aspart U-100, morphine, naloxone, nitroGLYCERIN, nitroGLYCERIN, nitroGLYCERIN, ondansetron, ondansetron, oxyCODONE, sodium chloride 0.9%, sodium chloride 0.9%, Flushing PICC Protocol **AND** sodium chloride 0.9% **AND** sodium chloride 0.9%    Family History    None       Tobacco Use    Smoking status: Former     Packs/day: 2.00     Years: 40.00     Pack years: 80.00     Types: Cigarettes     Start date: 1963     Quit date: 2018     Years since quittin.9    Smokeless tobacco: Never    Tobacco comments:     3/3 ppd x 40 yrs. Currently 3-4 cigarettes daily .He is trying  to quit. Is using a Vapor cigarettes  2-3 x's a day.   Substance and Sexual Activity    Alcohol use: Not Currently    Drug use: No    Sexual activity: Not Currently       Objective:     Vital Signs (Most Recent):  Temp: 98.2 °F (36.8 °C) (23 1100)  Pulse: 110 (23 1115)  Resp: (!) 28 (23 1115)  BP: (!) 94/59 (23 1115)  SpO2: (!) 94 % (23 1115)   Vital Signs (24h Range):  Temp:  [95.8 °F (35.4 °C)-98.6 °F (37 °C)] 98.2 °F (36.8 °C)  Pulse:  [] 110  Resp:  [18-43] 28  SpO2:  [75 %-100 %] 94 %  BP: ()/() 94/59  Arterial Line BP: (60-94)/(28-42) 94/42     Weight: 68 kg (150 lb)  Body mass index is 22.81 kg/m².    Physical Exam  Constitutional:       General: He is sleeping. He is not in acute distress.     Appearance: He is ill-appearing.   HENT:      Head: Atraumatic.      Comments: History of facial/oral reconstruction   Pulmonary:      Effort: Pulmonary effort is normal.      Comments: Trach  Abdominal:       Comments: PEG   Musculoskeletal:      Right lower leg: No edema.      Left lower leg: No edema.   Neurological:      Mental Status: He is oriented to person, place, and time. He is lethargic.      Comments: Sleeping; arousable/follows commands    Psychiatric:      Comments: Frustration with difficulties with communication; improved anxiousness 3/28 compared to 3/27       Advance Care Planning   Advance Directives:   Living Will: No    [place]POST: No    Do Not Resuscitate Status: No    Medical Power of : No      Decision Making:  Patient answered questions and Family answered questions  Goals of Care: What is most important right now is to focus on spending time at home, remaining as independent as possible, symptom/pain control, curative/life-prolongation (regardless of treatment burdens). Accordingly, we have decided that the best plan to meet the patient's goals includes continuing with treatment.       Significant Labs: All pertinent labs within the past 24 hours have been reviewed.  CBC:   Recent Labs   Lab 03/28/23  0617 03/28/23  0932   WBC 18.69*  --    HGB 8.1* 7.9*   HCT 24.3*  --    MCV 88  --      --        BMP:  Recent Labs   Lab 03/28/23  0427   *      K 4.4      CO2 19*   BUN 44*   CREATININE 1.3   CALCIUM 8.0*       LFT:  Lab Results   Component Value Date    AST 22 03/19/2023    ALKPHOS 65 03/19/2023    BILITOT 0.4 03/19/2023     Albumin:   Albumin   Date Value Ref Range Status   03/23/2023 2.2 (L) 3.5 - 5.2 g/dL Final     Protein:   Total Protein   Date Value Ref Range Status   03/19/2023 5.5 (L) 6.0 - 8.4 g/dL Final     Lactic acid:   Lab Results   Component Value Date    LACTATE 1.8 03/19/2023    LACTATE 0.8 05/14/2022       Significant Imaging: I have reviewed all pertinent imaging results/findings within the past 24 hours.

## 2023-03-28 NOTE — NURSING
Patient lying in bed connected to cardiac monitoring - appears calm and comfortable - weaning down Levo - abdomen is rounded - no complaints of pain to the abdomen - right groin dressing with serous bloody drainage noted - incision site soft to the touch- pedal pulses present -feet warm to the touch

## 2023-03-28 NOTE — PROGRESS NOTES
[facility] - Intensive Care  Palliative Medicine  Progress Note    Patient Name: Fareed Richard Jr.  MRN: [id number]  Admission Date: 3/18/2023  Hospital Length of Stay: 9 days  Code Status: Full Code   Attending Provider: Kelby Sargent MD  Consulting Provider: Trupti Beck NP  Primary Care Physician: Kodi Tubbs MD  Principal Problem:Hypotension    Patient information was obtained from patient, spouse/SO and primary team.      Assessment/Plan:   Advance Care Planning   Palliative Encounter   Date: 03/28/2023    Psychiatric  Anxiety  - anxiety which worsens respiratory symptoms, sometimes to the point of acute distress   - pt and wife confirm symptoms of anxiety affecting daily life; takes hydroxyzine HS for this and related insomnia; has required additional day time doses of Hydroxyzine to assist with anxiety symptoms  - hydroxyzine now available PRN through out the day as well; advised administration is timed to allow for HS dosing to assist with insomnia   - also discussed symptoms depression; recommendation for SSRI to help manage both anxiety and depression symptoms; will allow for cardiac procedure and ensure pt is no longer experiencing/nausea or vomiting before starting   - discussed role of home/clinic palliative in additional management of medication regimen and symptoms   - 3/23 overnight had severe episode of anxiety which ativan was given to attempt to relieve; post ativan became combative/disoriented requiring restraints and precedex; per wife similar episode with only other known time pt received ativan  - 3/27 - anxiety/restlessness observed during ICU rounds; requested ordered hydroxyzine be administered; anxiety seemed to be related to cath lab procedure today and back pain (ongoing pain interventions by RN including ordered fentanyl discussed plan to attempt bedside chair as soon as allowed post cath lab procedure to attempt relief of back pain); pt assessed post hydroxyzine admin,  symptoms improved including dyspnea   - 3/28 - improved anxiety symptoms 3/28 compared to 3/27  - plan discussed with hospital primary       ENT  Tracheostomy present  - see respiratory distress and squamous cell CA   - was initially admitted with significant bleeding to trach site which is now resolved, per notes and MDT discussion  - now off vent   - pt has frustrations with limited ability to verbalize/communicate; can become frustrated/anxious/dyspneic at times related to this; writes in a notebook often to support communication   - noted; management per hospital primary    Pulmonary  Acute on chronic respiratory failure with hypoxia  - trach; now off vent   - respiratory status greatly affected during periods of anxiety   - noted; management per hospital primary and PCCM     Cardiac/Vascular  NSTEMI (non-ST elevated myocardial infarction)  - cardiology consulted and following   - Echo showed EF 60%, inferobasal hypokinesis, indeterminate diastolic function  - nitro drip weaned; pt denying any continued angina  - cardiac cath procedure on 3/23; per cardiology poor CABG candidate; 3/27 PCI of prox LAD and LCx  - pt previously agreeable to any recommended cardiac procedures at this time   - 3/28 retroperitoneal hematoma; ongoing concern for pt's ability to tolerate anticoagulation and risk of limiting anti-coag on cardiac recovery   - noted; management per hospital primary, and Cardiology     Coronary artery disease involving native coronary artery of native heart with unstable angina pectoris  - see NSTEMI   - noted; management per hospital primary, and Cardiology     Oncology  Squamous cell cancer of tongue  -Diagnosed 12/15/21 via FNA; Underwent total glossectomy, bilateral neck dissection, bilateral cervical facial flaps and anterolateral thigh flap reconstruction of glossectomy defect; Completed adjuvant chemoradiation  -Followed by oncology and ENT outpt. No longer on chemo or radiation.   - has a trach  "since above treatment   - noted; management per hospital primary     GI  Retroperitoneal hematoma  - left flank/lower back pain reported 3/27  - CTA early AM 3/28 showing right retroperitoneal bleed   - ongoing concern for pt's inability to tolerate anticoagulation without episodes of bleeding; admitted with hemoptysis which is now resolved   - noted; management per hospital primary     PEG (percutaneous endoscopic gastrostomy) status  - PEG for feeding and meds; tube feeds typically well tolerated and no difficulty with home management by wife   - noted; management per hospital primary     Palliative Care  ACP (advance care planning)  Palliative Encounter  Date: 3/28/2023  - interval chart reviewed in detail; discussed pt during ICU MDT rounds   - pt with improved anxiety today despite what he reports as a "rough night" (referring to retroperitoneal bleed, see that section)   - me with pt and his wife; continued GOC for full treatment; good insight into potential for additional bleeding due to need to continue anticoagulation   - encouraged continued discussion of code status with wife and home palliative team; hoping code status discussion may be less stressful for pt to discuss in less acute setting   - good insight into risks/outcomes associated with aggressive resuscitation interventions if needed while full code   - continued emotional support provided; allowed time for questions/concerns  - ongoing communication with MDT     Palliative Encounter  Date: 3/27/2023  - interval chart reviewed in detail; discussed pt with ICU MDT Rounds   - pt observed to be very anxious/restless during rounds; discussed administering hydroxizine with KAMILAH Doran at that time and immediately following rounds met with pt and wife (see anxiety)  - ongoing discussion of anxiety management; plan to further address post cath lab procedures, including addition of SSRI and more long term management   - discussed planned procedure today; " pt continues to be agreeable to any offered procedures to improve condition   - emotional support provided to pt and wife; allowed time for questions/concerns      Palliative Encounter  Date: 3/23/2023  - interval chart reviewed in detail; discussed pt with ICU MDT Rounds   - pt very lethargic today post precedex (see anxiety); met with wife at bedside and discussed overnight events; wife does not wish for pt to receive ativan again in the future  - continue GOC/ACP discussion; awaiting discussing cardiac intervention options with cardiology   - wife continues to support pt's wishes for full treatment; she plans to take leave from work at time of discharge to care for pt     - re-discussed continued OP follow up with palliative medicine recommendation; wife prefers home palliative as opposed to clinic   - will discuss with CM/SW home palliative information session coordination with a hospice provider that also has hospice for future continuity of care; pt initially mistrusting of palliative medicine but is now agreeable; feel building a relationship with a palliative provider will help to establish trust for future care needs if/when hospice would be needed   - emotional support provided to wife; answered all questions   - discussed with hospital primary     Palliative Encounter  Date: 3/23/2023  - interval chart reviewed in detail; discussed pt with ICU MDT Rounds   - met with pt and wife at bedside for planned GOC/ACP discussion; pt with improved calmness related to this discussion; reassured pt at anytime if there was a topic he did not wish to discuss, or wished to stop meeting to let me know   - introduction to palliative medicine and role in current/future care provided to pt's wife  - discussed previously stated goals (as discussed with Dr. Escoto); pt confirms confirmed continued desire for full code and full treatment; reassured these wishes are and will be respected; encouraged that if at anytime those  wishes were to change that it could be discussed further at that time   - allowed pt and wife to share long road of cancer treatment and how this affects current GOC   - plan now in place for cardiac cath procedure today; briefly discussed  - discussed pt's symptoms of anxiety; wife also provided concerns of pt having a more depressed mood; validated anxiety and depression symptoms given pt's condition and other symptoms; discussed correlation of anxiety and respiratory symptoms; discussed options for anxiety management (see anxiety)   - pt's wife shared pt's fear of requiring a nursing home as likely source of anxiety related to previous visits; pt advocates that he would never wish to live in a  NH; wife is primary care giver and confirms they have a lot of equipment at home to help care for pt, and she is well versed on his care needs; bedside RN shared wife's concern for needed feeding supplies and requesting CM/SW assistance prior to discharge; CM/SW consult placed   - recommended home palliative or palliative clinic for continued palliative support, symptom management, and ACP discussions; they are agreeable to this; plan for follow up to determine if home palliative or clinic preferred  - emotional support provided; allowed time for questions/concerns, all addressed   - hospital primary updated    Palliative Consult  Date: 3/22/2023 (date corrections to initial consult)   - consult received; interval chart reviewed in detail; discussed pt during ICU MDT rounds   - met with patient at ICU bedside; introduction to palliative medicine team and role in current care and admission  - initial consult on 3/21, but pt's condition did not allow for GOC discussion, post poned as to not cause increased anxiety and respiratory compromise; previous detailed GOC/ACP discussion with, then primary, Dr. Escoto   - Assessed pt and discussed plan to have GOC/ACP discussion with wife present later in the day  - as time for  "planned discussion neared, pt became very anxious of pending discussion; discussed role of palliative and assured that I do not wish to cause pt anxiety about discussions, and he can request to not discuss at all; explained my intentions of meeting and that I would not try to persuade him into any plans that do not align with his GOC   - validated all the hard work and things pt has overcome in his cancer treatment   - pt seemed very relieved by above discussion and agreeable for further meeting on 3/23, with wife present   - emotional support provided; allowed time for questions/concerns, all addressed   - MDT updated, and ongoing communication with MDT         I will follow-up with patient. Please contact us if you have any additional questions.    Subjective:     Chief Complaint:   Chief Complaint   Patient presents with    Hemoptysis     Ems called to 72yo male that wife noticed was having blood y sputum in his trach on Wednesday. Went thursdsay to have a trach change and the dr was unale to scope his mouth above the trach due to him gagging but did change the trach. Continued to have the pink sputum until 0300 today and it had bright red blood from trach. Denied any pain or SOB       HPI:   From H&P: " Fareed Richard Jr. 73 y.o. 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  Per his wife 4 days ago he began to have pink tinged sputum via his trach.  He was seen by his ENT 2 days ago with a scope exam and a trach exchange without evidence of bleeding.  This morning at 3:00 a.m. he developed bright red blood via trach which resolved without intervention.  It is since not recurred.  He takes a daily aspirin.  He receives all medications via G-tube.  His tube feeding regimen consists of 6 cans of Isosource daily with 120 cc free water boluses.  He denies fever chest pain shortness of breath nausea " "vomiting abdominal pain leg swelling syncope dizziness dysuria melena hematuria hematemesis.     In the ED, hemoglobin 7.6 INR 1.0 BUN 50 creatinine 0.7  troponin negative BRCA negative O positive type and screen chest x-ray without detrimental change hypotensive to 85/54."     Palliative medicine consulted for goals of care and advance care planning; Met with pt at bedside; for details of visit, see advance care planning section of plan.         Hospital Course:  No notes on file    Past Medical History:   Diagnosis Date    Cancer     skin to Rt forearm and face    COPD (chronic obstructive pulmonary disease)     Hyperlipidemia     Hypertension     Pseudoaneurysm      Past Surgical History:   Procedure Laterality Date    ABDOMINAL SURGERY      stents placed in liver and large intestines, per patient    ANGIOGRAPHY OF AORTIC ARCH  3/27/2023    Procedure: Aortogram, Aortic Arch;  Surgeon: Tim Bhatt MD;  Location: [facility] CATH LAB;  Service: Cardiology;;    AORTOGRAPHY WITH SERIALOGRAPHY N/A 3/27/2023    Procedure: AORTOGRAM, WITH SERIALOGRAPHY;  Surgeon: Tim Bhatt MD;  Location: [facility] CATH LAB;  Service: Cardiology;  Laterality: N/A;    CAROTID STENT      COLONOSCOPY N/A 09/27/2022    Procedure: COLONOSCOPY;  Surgeon: Donnie Peterson MD;  Location: [facility] ([address] [place] [address]);  Service: Endoscopy;  Laterality: N/A;    COLONOSCOPY N/A 09/30/2022    Procedure: COLONOSCOPY;  Surgeon: Joy Cabrera MD;  Location: [facility] ([address] [place] [address]);  Service: Endoscopy;  Laterality: N/A;    CORONARY STENT PLACEMENT  01/2000    DISSECTION OF NECK Bilateral 01/04/2022    Procedure: DISSECTION, NECK;  Surgeon: Naeem Smalls MD;  [facility];  Service: ENT;  Laterality: Bilateral;    ESOPHAGOGASTRODUODENOSCOPY N/A 09/27/2022    Procedure: EGD (ESOPHAGOGASTRODUODENOSCOPY);  Surgeon: Donnie Peterson MD;  Location: [facility] ENDO ([address] [place] [address]);  Service: Endoscopy;  Laterality: N/A;    " ESOPHAGOGASTRODUODENOSCOPY N/A 09/30/2022    Procedure: EGD (ESOPHAGOGASTRODUODENOSCOPY);  Surgeon: Joy Cabrera MD;  Location: [facility] ENDO (2ND FLR);  Service: Endoscopy;  Laterality: N/A;    ESOPHAGOGASTRODUODENOSCOPY W/ PEG N/A 01/04/2022    Procedure: EGD, WITH PEG TUBE INSERTION;  Surgeon: Anthony Calabrese MD;  Location: [facility] OR [facility] FLR;  Service: General;  Laterality: N/A;    EYE SURGERY      Cataract bilateral    femoral stents      bilateral    FLAP PROCEDURE N/A 01/03/2022    Procedure: CREATION, FREE FLAP;  Surgeon: Naeem Smalls MD;  [facility];  Service: ENT;  Laterality: N/A;    FLAP PROCEDURE Right 01/04/2022    Procedure: CREATION, FREE FLAP;  Surgeon: Stacey Conde MD;  [facility];  Service: ENT;  Laterality: Right;  Ischemic start 1203  Ischemic stop 1353    GLOSSECTOMY N/A 01/04/2022    Procedure: GLOSSECTOMY;  Surgeon: Naeem Smalls MD;  [facility];  Service: ENT;  Laterality: N/A;    PERCUTANEOUS TRANSLUMINAL BALLOON ANGIOPLASTY OF CORONARY ARTERY Left 3/27/2023    Procedure: Angioplasty-coronary;  Surgeon: Tim Bhatt MD;  Location: [facility] CATH LAB;  Service: Cardiology;  Laterality: Left;  not before 9am, R rad access, 6 Fr EBU 3.5 guide    RADICAL NECK DISSECTION Left 01/03/2022    Procedure: DISSECTION, NECK, RADICAL;  Surgeon: Naeem Smalls MD;  [facility];  Service: ENT;  Laterality: Left;    SKIN BIOPSY      SKIN CANCER EXCISION      STENT, CORONARY, MULTI VESSEL  3/27/2023    Procedure: Stent, Coronary, Multi Vessel;  Surgeon: Tim Bhatt MD;  Location: [facility] CATH LAB;  Service: Cardiology;;    TRACHEOTOMY N/A 01/04/2022    Procedure: TRACHEOTOMY;  Surgeon: Naeem Smalls MD;  [facility];  Service: ENT;  Laterality: N/A;    VASCULAR SURGERY       Review of patient's allergies indicates:   Allergen Reactions    Ativan [lorazepam] Anxiety        Medications:  Continuous Infusions:   dexmedeTOMIDine (Precedex) infusion (titrating) Stopped (23 1100)    nitroGLYCERIN      NORepinephrine bitartrate-D5W 0.2 mcg/kg/min (23)    sodium chloride 0.9% 250 mL/hr at 23     Scheduled Meds:   aspirin  81 mg Oral Daily    atorvastatin  80 mg Per G Tube Daily    clopidogreL  75 mg Oral Daily    ferrous sulfate  1 tablet Per G Tube Daily    furosemide  40 mg Per G Tube BID    glycopyrrolate  1 mg Per G Tube TID    LIDOcaine  2 patch Transdermal Q24H    melatonin  9 mg Per G Tube Nightly    metoprolol tartrate  25 mg Per G Tube BID    polyethylene glycol  17 g Oral BID    sodium chloride 0.9%  10 mL Intravenous Q6H     PRN Meds:sodium chloride, sodium chloride, sodium chloride, acetaminophen, acetaminophen, acetaminophen, albuterol-ipratropium, atropine, fentaNYL, guaiFENesin 100 mg/5 ml, HYDROcodone-acetaminophen, HYDROcodone-acetaminophen, hydrOXYzine HCL, insulin aspart U-100, morphine, naloxone, nitroGLYCERIN, nitroGLYCERIN, nitroGLYCERIN, ondansetron, ondansetron, oxyCODONE, sodium chloride 0.9%, sodium chloride 0.9%, Flushing PICC Protocol **AND** sodium chloride 0.9% **AND** sodium chloride 0.9%    Family History    None       Tobacco Use    Smoking status: Former     Packs/day: 2.00     Years: 40.00     Pack years: 80.00     Types: Cigarettes     Start date: 1963     Quit date: 2018     Years since quittin.9    Smokeless tobacco: Never    Tobacco comments:     3/3 ppd x 40 yrs. Currently 3-4 cigarettes daily .He is trying  to quit. Is using a Vapor cigarettes  2-3 x's a day.   Substance and Sexual Activity    Alcohol use: Not Currently    Drug use: No    Sexual activity: Not Currently       Objective:     Vital Signs (Most Recent):  Temp: 98.2 °F (36.8 °C) (23 1100)  Pulse: 110 (23 1115)  Resp: (!) 28 (23 1115)  BP: (!) 94/59 (23 1115)  SpO2: (!) 94 % (23 1115)   Vital  Signs (24h Range):  Temp:  [95.8 °F (35.4 °C)-98.6 °F (37 °C)] 98.2 °F (36.8 °C)  Pulse:  [] 110  Resp:  [18-43] 28  SpO2:  [75 %-100 %] 94 %  BP: ()/() 94/59  Arterial Line BP: (60-94)/(28-42) 94/42     Weight: 68 kg (150 lb)  Body mass index is 22.81 kg/m².    Physical Exam  Constitutional:       General: He is sleeping. He is not in acute distress.     Appearance: He is ill-appearing.   HENT:      Head: Atraumatic.      Comments: History of facial/oral reconstruction   Pulmonary:      Effort: Pulmonary effort is normal.      Comments: Trach  Abdominal:      Comments: PEG   Musculoskeletal:      Right lower leg: No edema.      Left lower leg: No edema.   Neurological:      Mental Status: He is oriented to person, place, and time. He is lethargic.      Comments: Sleeping; arousable/follows commands    Psychiatric:      Comments: Frustration with difficulties with communication; improved anxiousness 3/28 compared to 3/27       Advance Care Planning   Advance Directives:   Living Will: No    LaPOST: No    Do Not Resuscitate Status: No    Medical Power of : No      Decision Making:  Patient answered questions and Family answered questions  Goals of Care: What is most important right now is to focus on spending time at home, remaining as independent as possible, symptom/pain control, curative/life-prolongation (regardless of treatment burdens). Accordingly, we have decided that the best plan to meet the patient's goals includes continuing with treatment.       Significant Labs: All pertinent labs within the past 24 hours have been reviewed.  CBC:   Recent Labs   Lab 03/28/23  0617 03/28/23  0932   WBC 18.69*  --    HGB 8.1* 7.9*   HCT 24.3*  --    MCV 88  --      --        BMP:  Recent Labs   Lab 03/28/23  0427   *      K 4.4      CO2 19*   BUN 44*   CREATININE 1.3   CALCIUM 8.0*       LFT:  Lab Results   Component Value Date    AST 22 03/19/2023    ALKPHOS 65  03/19/2023    BILITOT 0.4 03/19/2023     Albumin:   Albumin   Date Value Ref Range Status   03/23/2023 2.2 (L) 3.5 - 5.2 g/dL Final     Protein:   Total Protein   Date Value Ref Range Status   03/19/2023 5.5 (L) 6.0 - 8.4 g/dL Final     Lactic acid:   Lab Results   Component Value Date    LACTATE 1.8 03/19/2023    LACTATE 0.8 05/14/2022       Significant Imaging: I have reviewed all pertinent imaging results/findings within the past 24 hours.        Total visit time: 55 minutes    > 50% of  35  min visit spent in chart review, face to face discussion of symptom assessment, coordination of care with other specialists, and discharge planning.    20 min ACP time spent: goals of care, emotional support, formulating and communicating prognosis, exploring burden/ benefit of various approaches of treatment.     Trupti Beck NP  Palliative Medicine  [facility] - Intensive Care

## 2023-03-28 NOTE — NURSING
Partial bath given - patient appears very anxious and complaints of pain - generalized - medicated and flushed tube - held pm feeding due to earlier nausea - small soft bowel movement - patient states he feels as though he needs to have a bowel movement - will continue to monitor - resting comfortably at this time - call bell within reach

## 2023-03-28 NOTE — NURSING
No specimen collected. Estimated Blood Loss: <30 mL. Patient asking about tube feeds - complaints of being hungry - pending MD approval

## 2023-03-28 NOTE — ASSESSMENT & PLAN NOTE
- anxiety which worsens respiratory symptoms, sometimes to the point of acute distress   - pt and wife confirm symptoms of anxiety affecting daily life; takes hydroxyzine HS for this and related insomnia; has required additional day time doses of Hydroxyzine to assist with anxiety symptoms  - hydroxyzine now available PRN through out the day as well; advised administration is timed to allow for HS dosing to assist with insomnia   - also discussed symptoms depression; recommendation for SSRI to help manage both anxiety and depression symptoms; will allow for cardiac procedure and ensure pt is no longer experiencing/nausea or vomiting before starting   - discussed role of home/clinic palliative in additional management of medication regimen and symptoms   - 3/23 overnight had severe episode of anxiety which ativan was given to attempt to relieve; post ativan became combative/disoriented requiring restraints and precedex; per wife similar episode with only other known time pt received ativan  - 3/27 - anxiety/restlessness observed during ICU rounds; requested ordered hydroxyzine be administered; anxiety seemed to be related to cath lab procedure today and back pain (ongoing pain interventions by RN including ordered fentanyl discussed plan to attempt bedside chair as soon as allowed post cath lab procedure to attempt relief of back pain); pt assessed post hydroxyzine admin, symptoms improved including dyspnea   - 3/28 - improved anxiety symptoms 3/28 compared to 3/27  - plan discussed with hospital primary

## 2023-03-28 NOTE — ASSESSMENT & PLAN NOTE
"-Denies chest pain but on 3/19 did have unexplained hypotension with severe back/shoulder pain  -Troponin on admit normal, but on 3/19 bumped to 1.7 and then 2.2 on 3/20.  -No jamil ischemic change on EKG  -Echo showed EF 60%, inferobasal hypokinesis, indeterminate diastolic function  -Cardiology consulted and input appreciated  -Cardiology suspects this is due to demand ischemia associated with his anemia and hypotension.  Further, he is a poor candidate for angiogram given his hemoptysis and inability to treat with blood thinning medications.  Recommended outpatient stress testing  -Holding aspirin given hemoptysis and metoprolol given hypotension  -Continue statin.  Resume BB as able. On nitro drip for chest pain. - having difficulty weaning off. He is currently cleared for DAPT as no further bleeding issues at this time. Updated Cardiology.   -Patient underwent LHC/angiogram on 2/23 - findings below.  "3/23/23 LHC - EDP 13, distal LM 30%, mild LAD 90% bifurcation lesion with D1, Cx mid 80%, RCA moderate diffuse disease with long 70% and some left to right collateral. All vessels heavily calcified     Reviewed with Dr Malone - poor candidate for CABG. Will discuss staged PCI of LAD/Cx if able to tolerate DAPT without further pulmonary bleeding"    - Currently on ASA/Plavix and low dose heparin infusion. Cardiology discussing intervention with patient and wife today. No intervention planned for today.    S/p LAD/Cx PCI    "

## 2023-03-28 NOTE — NURSING
Patient able to void - in no acute distress - wife at the bedside - encourage to get out of the bed to the chair with assistance

## 2023-03-28 NOTE — NURSING
Patient does have a red raised area to his upper left back - wife states has been there for years - has been followed by dermatology

## 2023-03-28 NOTE — NURSING
Patient with complaints of fullness - only voided about 50cc - no blood noted - serial h/h - Dr Sargent notified hem 7.9- bladder scan done - 250 cc of urine from straight cath - no blood noted -abdomen is tight - right groin dressing changed this am dry and intact - Levo remains paused at this time- clarified with Dr Sargent regarding giving blood thinners - ok to give -hold off on tube feeds till further notice

## 2023-03-28 NOTE — NURSING
Air completely removed from radial band - no bleeding noted - still with pain and hypotensive - remains on Levo - report given to oncoming nurse

## 2023-03-29 LAB
ALBUMIN SERPL BCP-MCNC: 2.5 G/DL (ref 3.5–5.2)
ALP SERPL-CCNC: 72 U/L (ref 55–135)
ALT SERPL W/O P-5'-P-CCNC: 57 U/L (ref 10–44)
ANION GAP SERPL CALC-SCNC: 6 MMOL/L (ref 8–16)
AST SERPL-CCNC: 40 U/L (ref 10–40)
BACTERIA SPEC AEROBE CULT: ABNORMAL
BASOPHILS # BLD AUTO: 0.01 K/UL (ref 0–0.2)
BASOPHILS # BLD AUTO: 0.01 K/UL (ref 0–0.2)
BASOPHILS NFR BLD: 0.1 % (ref 0–1.9)
BASOPHILS NFR BLD: 0.1 % (ref 0–1.9)
BILIRUB SERPL-MCNC: 0.6 MG/DL (ref 0.1–1)
BLD PROD TYP BPU: NORMAL
BLOOD UNIT EXPIRATION DATE: NORMAL
BLOOD UNIT TYPE CODE: 5100
BLOOD UNIT TYPE: NORMAL
BUN SERPL-MCNC: 46 MG/DL (ref 8–23)
CALCIUM SERPL-MCNC: 8.4 MG/DL (ref 8.7–10.5)
CHLORIDE SERPL-SCNC: 105 MMOL/L (ref 95–110)
CO2 SERPL-SCNC: 33 MMOL/L (ref 23–29)
CODING SYSTEM: NORMAL
CREAT SERPL-MCNC: 1 MG/DL (ref 0.5–1.4)
CROSSMATCH INTERPRETATION: NORMAL
DIFFERENTIAL METHOD: ABNORMAL
DIFFERENTIAL METHOD: ABNORMAL
DISPENSE STATUS: NORMAL
EOSINOPHIL # BLD AUTO: 0 K/UL (ref 0–0.5)
EOSINOPHIL # BLD AUTO: 0 K/UL (ref 0–0.5)
EOSINOPHIL NFR BLD: 0.2 % (ref 0–8)
EOSINOPHIL NFR BLD: 0.4 % (ref 0–8)
ERYTHROCYTE [DISTWIDTH] IN BLOOD BY AUTOMATED COUNT: 14.9 % (ref 11.5–14.5)
ERYTHROCYTE [DISTWIDTH] IN BLOOD BY AUTOMATED COUNT: 15.3 % (ref 11.5–14.5)
EST. GFR  (NO RACE VARIABLE): >60 ML/MIN/1.73 M^2
GLUCOSE SERPL-MCNC: 123 MG/DL (ref 70–110)
GRAM STN SPEC: ABNORMAL
HCT VFR BLD AUTO: 19.8 % (ref 40–54)
HCT VFR BLD AUTO: 25.2 % (ref 40–54)
HGB BLD-MCNC: 6.5 G/DL (ref 14–18)
HGB BLD-MCNC: 8 G/DL (ref 14–18)
IMM GRANULOCYTES # BLD AUTO: 0.03 K/UL (ref 0–0.04)
IMM GRANULOCYTES # BLD AUTO: 0.07 K/UL (ref 0–0.04)
IMM GRANULOCYTES NFR BLD AUTO: 0.3 % (ref 0–0.5)
IMM GRANULOCYTES NFR BLD AUTO: 0.8 % (ref 0–0.5)
LYMPHOCYTES # BLD AUTO: 0.4 K/UL (ref 1–4.8)
LYMPHOCYTES # BLD AUTO: 0.4 K/UL (ref 1–4.8)
LYMPHOCYTES NFR BLD: 3.9 % (ref 18–48)
LYMPHOCYTES NFR BLD: 4.4 % (ref 18–48)
MCH RBC QN AUTO: 28.1 PG (ref 27–31)
MCH RBC QN AUTO: 29.3 PG (ref 27–31)
MCHC RBC AUTO-ENTMCNC: 31.7 G/DL (ref 32–36)
MCHC RBC AUTO-ENTMCNC: 32.8 G/DL (ref 32–36)
MCV RBC AUTO: 88 FL (ref 82–98)
MCV RBC AUTO: 89 FL (ref 82–98)
MONOCYTES # BLD AUTO: 0.9 K/UL (ref 0.3–1)
MONOCYTES # BLD AUTO: 0.9 K/UL (ref 0.3–1)
MONOCYTES NFR BLD: 9.6 % (ref 4–15)
MONOCYTES NFR BLD: 9.6 % (ref 4–15)
NEUTROPHILS # BLD AUTO: 7.6 K/UL (ref 1.8–7.7)
NEUTROPHILS # BLD AUTO: 7.6 K/UL (ref 1.8–7.7)
NEUTROPHILS NFR BLD: 85.2 % (ref 38–73)
NEUTROPHILS NFR BLD: 85.4 % (ref 38–73)
NRBC BLD-RTO: 0 /100 WBC
NRBC BLD-RTO: 0 /100 WBC
NUM UNITS TRANS PACKED RBC: NORMAL
PLATELET # BLD AUTO: 170 K/UL (ref 150–450)
PLATELET # BLD AUTO: 183 K/UL (ref 150–450)
PMV BLD AUTO: 9.4 FL (ref 9.2–12.9)
PMV BLD AUTO: 9.4 FL (ref 9.2–12.9)
POCT GLUCOSE: 117 MG/DL (ref 70–110)
POCT GLUCOSE: 164 MG/DL (ref 70–110)
POTASSIUM SERPL-SCNC: 4.1 MMOL/L (ref 3.5–5.1)
PROT SERPL-MCNC: 5.3 G/DL (ref 6–8.4)
RBC # BLD AUTO: 2.22 M/UL (ref 4.6–6.2)
RBC # BLD AUTO: 2.85 M/UL (ref 4.6–6.2)
SODIUM SERPL-SCNC: 144 MMOL/L (ref 136–145)
WBC # BLD AUTO: 8.89 K/UL (ref 3.9–12.7)
WBC # BLD AUTO: 8.92 K/UL (ref 3.9–12.7)

## 2023-03-29 PROCEDURE — 25000242 PHARM REV CODE 250 ALT 637 W/ HCPCS: Performed by: HOSPITALIST

## 2023-03-29 PROCEDURE — 25000003 PHARM REV CODE 250: Performed by: HOSPITALIST

## 2023-03-29 PROCEDURE — 94640 AIRWAY INHALATION TREATMENT: CPT

## 2023-03-29 PROCEDURE — P9016 RBC LEUKOCYTES REDUCED: HCPCS | Performed by: STUDENT IN AN ORGANIZED HEALTH CARE EDUCATION/TRAINING PROGRAM

## 2023-03-29 PROCEDURE — 25000003 PHARM REV CODE 250: Performed by: INTERNAL MEDICINE

## 2023-03-29 PROCEDURE — 99291 CRITICAL CARE FIRST HOUR: CPT | Mod: ,,, | Performed by: INTERNAL MEDICINE

## 2023-03-29 PROCEDURE — 85025 COMPLETE CBC W/AUTO DIFF WBC: CPT | Mod: 91 | Performed by: STUDENT IN AN ORGANIZED HEALTH CARE EDUCATION/TRAINING PROGRAM

## 2023-03-29 PROCEDURE — 25000003 PHARM REV CODE 250: Performed by: REGISTERED NURSE

## 2023-03-29 PROCEDURE — 27000221 HC OXYGEN, UP TO 24 HOURS

## 2023-03-29 PROCEDURE — 97161 PT EVAL LOW COMPLEX 20 MIN: CPT

## 2023-03-29 PROCEDURE — 36430 TRANSFUSION BLD/BLD COMPNT: CPT

## 2023-03-29 PROCEDURE — 99900035 HC TECH TIME PER 15 MIN (STAT)

## 2023-03-29 PROCEDURE — A4216 STERILE WATER/SALINE, 10 ML: HCPCS | Performed by: HOSPITALIST

## 2023-03-29 PROCEDURE — 97535 SELF CARE MNGMENT TRAINING: CPT

## 2023-03-29 PROCEDURE — 20000000 HC ICU ROOM

## 2023-03-29 PROCEDURE — 97166 OT EVAL MOD COMPLEX 45 MIN: CPT

## 2023-03-29 PROCEDURE — 25000003 PHARM REV CODE 250: Performed by: STUDENT IN AN ORGANIZED HEALTH CARE EDUCATION/TRAINING PROGRAM

## 2023-03-29 PROCEDURE — 97530 THERAPEUTIC ACTIVITIES: CPT

## 2023-03-29 PROCEDURE — 94761 N-INVAS EAR/PLS OXIMETRY MLT: CPT

## 2023-03-29 PROCEDURE — 80053 COMPREHEN METABOLIC PANEL: CPT | Performed by: STUDENT IN AN ORGANIZED HEALTH CARE EDUCATION/TRAINING PROGRAM

## 2023-03-29 PROCEDURE — 99900026 HC AIRWAY MAINTENANCE (STAT)

## 2023-03-29 PROCEDURE — 85025 COMPLETE CBC W/AUTO DIFF WBC: CPT | Performed by: HOSPITALIST

## 2023-03-29 PROCEDURE — 99291 PR CRITICAL CARE, E/M 30-74 MINUTES: ICD-10-PCS | Mod: ,,, | Performed by: INTERNAL MEDICINE

## 2023-03-29 RX ORDER — HYDROCODONE BITARTRATE AND ACETAMINOPHEN 500; 5 MG/1; MG/1
TABLET ORAL
Status: DISCONTINUED | OUTPATIENT
Start: 2023-03-29 | End: 2023-04-03

## 2023-03-29 RX ADMIN — GLYCOPYRROLATE 1 MG: 1 TABLET ORAL at 09:03

## 2023-03-29 RX ADMIN — POLYETHYLENE GLYCOL 3350 17 G: 17 POWDER, FOR SOLUTION ORAL at 09:03

## 2023-03-29 RX ADMIN — ATORVASTATIN CALCIUM 80 MG: 40 TABLET, FILM COATED ORAL at 09:03

## 2023-03-29 RX ADMIN — Medication 10 ML: at 06:03

## 2023-03-29 RX ADMIN — METOPROLOL TARTRATE 25 MG: 25 TABLET, FILM COATED ORAL at 09:03

## 2023-03-29 RX ADMIN — FUROSEMIDE 40 MG: 40 TABLET ORAL at 05:03

## 2023-03-29 RX ADMIN — MELATONIN TAB 3 MG 9 MG: 3 TAB at 09:03

## 2023-03-29 RX ADMIN — ASPIRIN 81 MG CHEWABLE TABLET 81 MG: 81 TABLET CHEWABLE at 09:03

## 2023-03-29 RX ADMIN — IPRATROPIUM BROMIDE AND ALBUTEROL SULFATE 3 ML: 2.5; .5 SOLUTION RESPIRATORY (INHALATION) at 11:03

## 2023-03-29 RX ADMIN — GLYCOPYRROLATE 1 MG: 1 TABLET ORAL at 05:03

## 2023-03-29 RX ADMIN — Medication 10 ML: at 02:03

## 2023-03-29 RX ADMIN — FERROUS SULFATE TAB 325 MG (65 MG ELEMENTAL FE) 1 EACH: 325 (65 FE) TAB at 09:03

## 2023-03-29 RX ADMIN — HYDROCODONE BITARTRATE AND ACETAMINOPHEN 1 TABLET: 5; 325 TABLET ORAL at 12:03

## 2023-03-29 RX ADMIN — Medication 10 ML: at 05:03

## 2023-03-29 RX ADMIN — CLOPIDOGREL BISULFATE 75 MG: 75 TABLET ORAL at 09:03

## 2023-03-29 RX ADMIN — HYDROCODONE BITARTRATE AND ACETAMINOPHEN 1 TABLET: 5; 325 TABLET ORAL at 03:03

## 2023-03-29 RX ADMIN — Medication 10 ML: at 12:03

## 2023-03-29 RX ADMIN — FUROSEMIDE 40 MG: 40 TABLET ORAL at 09:03

## 2023-03-29 RX ADMIN — HYDROCODONE BITARTRATE AND ACETAMINOPHEN 1 TABLET: 5; 325 TABLET ORAL at 09:03

## 2023-03-29 RX ADMIN — HYDROXYZINE HYDROCHLORIDE 25 MG: 25 TABLET ORAL at 09:03

## 2023-03-29 NOTE — NURSING
Patient sat up in the bedside chair for several hours and tolerated well- thick blood tinged thick sputum- strong cough - did receive one neb treatment earlier - tolerated bolus feed - flushed with water - back to the bed with minimal assistance - wife at the bedside - vital signs stable - remains off levo - call bell within reach

## 2023-03-29 NOTE — PROGRESS NOTES
[facility] [place] [facility]  [place]  Progress Note    Patient Name: Fareed Richard Jr.  MRN: [id number]  Admission Date: 3/18/2023  Hospital Length of Stay: 10 days  Code Status: Full Code   Attending Physician: Kelby Sargent MD   Primary Care Physician: Kodi Tubbs MD  Expected Discharge Date:   Principal Problem:Hypotension    Subjective:     Hospital Course:   3/21/23 Developed increased SOB last PM. Troponin increased to 3. EKG with LBBB associated with sinus tachycardia that resolved when HR improved. Started on heparin which he has tolerated so far without further hemoptysis. Low dose tridil     3/22/23 Awake on tach collar. Denies CP. Less SOB. HCT continues to drop on heparin. No further hemoptysis. ST depression noted on telemetry. Good UOP to lasix 40 mg IV last PM. BNP 2543    3/23/23 LHC - EDP 13, distal LM 30%, mild LAD 90% bifurcation lesion with D1, Cx mid 80%, RCA moderate diffuse disease with long 70% and some left to right collateral. All vessels heavily calcified  Reviewed with Dr Malone - poor candidate for CABG. Will discuss staged PCI of LAD/Cx if able to tolerate DAPT without further pulmonary bleeding.    3/27: PCI prox LAD and mid LCx via R CFA.    Interval Hx: pt seen in ICU, case d/w Dr. Sargent and RN.  Per pulm team, pt was actually having back pain prior to cath, ?RP hematoma predating procedure.  Nevertheless, pt doing better today.  No cp/sob.  Transfused overnight.    Tele: SR 70s (personally reviewed and interpreted)          Review of Systems   Reason unable to perform ROS: trach.   Objective:     Vital Signs (Most Recent):  Temp: 97.8 °F (36.6 °C) (03/29/23 1100)  Pulse: 79 (03/29/23 1530)  Resp: 16 (03/29/23 1530)  BP: (!) 99/54 (03/29/23 1530)  SpO2: (!) 91 % (03/29/23 1530)   Vital Signs (24h Range):  Temp:  [97.5 °F (36.4 °C)-99.5 °F (37.5 °C)] 97.8 °F (36.6 °C)  Pulse:  [] 79  Resp:  [13-34] 16  SpO2:  [83 %-98 %] 91 %  BP: ()/(52-79) 99/54     Weight: 68 kg  (150 lb)  Body mass index is 22.81 kg/m².     SpO2: (!) 91 %         Intake/Output Summary (Last 24 hours) at 3/29/2023 1556  Last data filed at 3/29/2023 1500  Gross per 24 hour   Intake 943.33 ml   Output 1175 ml   Net -231.67 ml         Lines/Drains/Airways       Peripherally Inserted Central Catheter Line  Duration             PICC Triple Lumen 03/20/23 2145 right basilic 8 days              Drain  Duration                  Gastrostomy/Enterostomy 01/04/22 Percutaneous endoscopic gastrostomy (PEG)  days              Airway  Duration             Adult Surgical Airway 03/16/23 1014 Shiley Uncuffed 6.0 13 days                    Physical Exam  Constitutional:       General: He is not in acute distress.     Appearance: He is well-developed. He is ill-appearing. He is not toxic-appearing or diaphoretic.   HENT:      Head: Normocephalic and atraumatic.   Eyes:      General: No scleral icterus.     Extraocular Movements: Extraocular movements intact.      Conjunctiva/sclera: Conjunctivae normal.      Pupils: Pupils are equal, round, and reactive to light.   Neck:      Thyroid: No thyromegaly.      Vascular: No JVD.      Trachea: No tracheal deviation.      Comments: Trach in place  Cardiovascular:      Rate and Rhythm: Normal rate and regular rhythm.      Heart sounds: S1 normal and S2 normal. Heart sounds are distant. No murmur heard.    No friction rub. No gallop.   Pulmonary:      Effort: Pulmonary effort is normal. No respiratory distress.      Breath sounds: Normal breath sounds. No stridor. No wheezing, rhonchi or rales.   Chest:      Chest wall: No tenderness.   Abdominal:      General: There is no distension.      Palpations: Abdomen is soft.   Musculoskeletal:         General: No swelling or tenderness. Normal range of motion.      Cervical back: Normal range of motion and neck supple. No rigidity.      Right lower leg: No edema.      Left lower leg: No edema.   Skin:     General: Skin is warm and dry.       Coloration: Skin is not jaundiced.   Neurological:      General: No focal deficit present.      Mental Status: He is oriented to person, place, and time.      Cranial Nerves: No cranial nerve deficit.   Psychiatric:         Mood and Affect: Mood normal.         Behavior: Behavior normal.       Current Medications:   aspirin  81 mg Oral Daily    atorvastatin  80 mg Per G Tube Daily    clopidogreL  75 mg Oral Daily    ferrous sulfate  1 tablet Per G Tube Daily    furosemide  40 mg Per G Tube BID    glycopyrrolate  1 mg Per G Tube TID    LIDOcaine  2 patch Transdermal Q24H    melatonin  9 mg Per G Tube Nightly    metoprolol tartrate  25 mg Per G Tube BID    polyethylene glycol  17 g Oral BID    sodium chloride 0.9%  10 mL Intravenous Q6H      dexmedeTOMIDine (Precedex) infusion (titrating) Stopped (03/24/23 1100)    nitroGLYCERIN      NORepinephrine bitartrate-D5W 0.2 mcg/kg/min (03/28/23 0519)    sodium chloride 0.9% 250 mL/hr at 03/28/23 0519     sodium chloride, acetaminophen, acetaminophen, acetaminophen, albuterol-ipratropium, atropine, fentaNYL, guaiFENesin 100 mg/5 ml, HYDROcodone-acetaminophen, HYDROcodone-acetaminophen, hydrOXYzine HCL, insulin aspart U-100, morphine, naloxone, nitroGLYCERIN, nitroGLYCERIN, nitroGLYCERIN, ondansetron, ondansetron, oxyCODONE, sodium chloride 0.9%, sodium chloride 0.9%, Flushing PICC Protocol **AND** sodium chloride 0.9% **AND** sodium chloride 0.9%    Laboratory (all labs reviewed):  CBC:  Recent Labs   Lab 03/27/23  0355 03/27/23  2038 03/28/23  0427 03/28/23  0617 03/28/23  0932 03/29/23  0252 03/29/23  0944   WBC 11.80  --  22.23 H 18.69 H  --  8.89 8.92   Hemoglobin 8.4 L   < > 7.4 L 8.1 L 7.9 L 6.5 L 8.0 L   Hematocrit 26.8 L  --  23.5 L 24.3 L  --  19.8 LL 25.2 L   Platelets 301  --  309 269  --  183 170    < > = values in this interval not displayed.         CHEMISTRIES:  Recent Labs   Lab 05/10/22  1031 05/11/22  0412 05/12/22  0343 05/14/22  0724  05/17/22  1007 08/24/22  1000 03/22/23  0233 03/23/23  0332 03/24/23  0357 03/25/23  0429 03/26/23  0357 03/27/23  0355 03/28/23  0205 03/28/23  0427 03/29/23  0944   Glucose 130 H 103 110 96 162 H   < > 181 H 178 H  --   --   --  139 H 153 H 159 H 123 H   Sodium 140 139 135 L 136 133 L   < > 137 135 L  --   --   --  141 140 138 144   Potassium 4.6 5.0 4.4 4.1 5.0   < > 4.2 3.8  --   --   --  3.4 L 4.4 4.4 4.1   BUN 22 26 H 24 H 18 15   < > 24 H 33 H  --   --   --  38 H 42 H 44 H 46 H   Creatinine 0.8 0.7 0.7 0.7 0.7   < > 0.8 0.7  --   --   --  0.9 1.3 1.3 1.0   eGFR if non African American >60.0 >60.0 >60.0 >60 >60.0  --   --   --   --   --   --   --   --   --   --    eGFR  --   --   --   --   --    < > >60 >60  --   --   --  >60 58 A 58 A >60   Calcium 9.7 9.7 9.1 9.6 9.5   < > 8.1 L 8.0 L  --   --   --  8.6 L 8.0 L 8.0 L 8.4 L   Magnesium  --  2.3 2.0  --  2.0   < > 1.9 2.1 2.2 2.4 2.3  --   --   --   --     < > = values in this interval not displayed.         CARDIAC BIOMARKERS:  Recent Labs   Lab 09/14/22  0459 03/18/23  1106 03/19/23  1308 03/19/23  1840 03/20/23  1642   Troponin I 0.010 0.010 1.736 H 2.222 H 3.416 H         COAGS:  Recent Labs   Lab 01/12/22  0510 09/14/22  0641 03/18/23  1106 03/20/23  1744 03/27/23  2143   INR 0.9 1.0 1.0 1.0 1.2         LIPIDS/LFTS:  Recent Labs   Lab 09/30/22  0317 11/23/22  0733 03/18/23  1106 03/19/23  0413 03/29/23  0944   AST 16 17 16 22 40   ALT 16 14 16 15 57 H         BNP:  Recent Labs   Lab 05/14/22  0725 09/14/22  0459 09/23/22  1247 03/18/23  1106 03/21/23  1525   BNP 98 185 H 468 H 136 H 2,543 H         TSH:  Recent Labs   Lab 02/16/22  1157 08/24/22  1000 11/23/22  0733 01/24/23  1105 03/19/23  1308   TSH 1.332 2.279 4.924 H 4.861 H 2.787         Free T4:  Recent Labs   Lab 11/23/22  0733 01/24/23  1105   Free T4 0.86 0.90         Diagnostic Results:  CTA A/P 3/28/23 (images prev personally reviewed and interpreted)  1.  Redemonstration of left-sided  retroperitoneal hemorrhage of relatively stable and volume and distribution compared to prior examination.  No jamil extravasation of IV contrast material appreciated on the arterial phase or definitive pooling of contrast material on the delayed phase.  There is a subtle punctate focal hyperdensity only appreciated on the delayed phase which is favored to represent artifact, although small focus of venous hemorrhage would be difficult to exclude.  Consider short-term follow-up repeat CT as clinically warranted.  2.  Extensive atherosclerotic calcification of the abdominal aorta and major branch vessels.  There is apparent occlusion of the partially visualized right SFA.  Additionally, there are suspected hemodynamically significant stenosis involving the bilateral renal arteries.  Please see above for additional details.  3.  Redemonstration of persistent bilateral renal nephrograms with wedge-shaped regions of hypoattenuation predominantly involving the upper poles which may relate to evolving renal infarcts.  4.  Nonspecific urinary bladder wall thickening.  Moderate urinary bladder distension.    PCI LAD/LCx 3/27/23 (images prev personally reviewed and interpreted)  LVEDP: 3mmHg  LVEF: 60%  Aortic arch: severe calcific atherosclerosis  Ost innominate artery 80%  Prox R SCA 90%, 53mmHg gradient across stenoses.  Prox-mid ICA patent  Distal aortoiliac bifurcation with patent stents in ЮЛИЯ bilaterally and possible severe ISR in R ЮЛИЯ.  Severe diffuse cor Ca++  Dominance: Right  LM: MLI  LAD: prox 95% at D1, mid   Ramus: MLI  LCx: mid 90%  RCA: not injected, diffuse disease, severe dist RCA stenosis (see Dr. Benitez cath report  PCI prox LAD:  Preop ASA/Plavix, intraop heparin  Wired LAD/D1  Predil 2.5x12, 2.75x15 NC  Stent 3.0x24 Synergy BRADLEY  Post IVUS with mild underexpansion mid stent  Postdil 3.25x20 NC at 22atm  Excellent angiographic result, T3 flow, 0% residual stenosis  PCI mid LCx  Predil 2.5x12  Stent  2.75x20 Synergy BRADLEY 14 fidelia  Unavble to pass IVUS due to severe prox LCx tortuosity  Excellent angiographic result, T3 flow, 0% residual stenosis  Hemostasis:  R Radial band  RFA man comp  Impression:  NSTEMI with ongoing unstable sxs  Severe Ca++ 3V CAD, normal LVx  Severe PAD with severe innominate and prox R SCA stenoses and R ЮЛИЯ ISR.  Successful MV PCI:  Prox LAD 3.0x24 Synergy BRADLEY  Mid LCx 2.75x20 Synergy BRADLEY  RFA man comp  R rad vasband  Plan:  Cont med rx  ASA 81mg qd indefinitely  Plavix for minimum 1 year (thru 3/2024). If absolutely necessary, DAPT can be stopped in 30 day (4/27/23).  Would consider prolonged rx if pt tolerates.    Statin  Dispo planning as per ICU team  Follow up with Dr. Bhatt    Echo: 3/20/23   The left ventricle is normal in size with concentric hypertrophy and normal systolic function.   The estimated ejection fraction is 60%. Inferobasal hypokinesis   Indeterminate left ventricular diastolic function.   Normal right ventricular size with normal right ventricular systolic function.   Mild left atrial enlargement.   Mild right atrial enlargement.   Normal central venous pressure (3 mmHg).   The estimated PA systolic pressure is 13 mmHg.   The sinuses of Valsalva is mildly dilated. 4.2cm.    Cath: 3/23/23 (images personally reviewed and interpreted)  3/23/23 C - EDP 13, distal LM 30%, mild LAD 90% bifurcation lesion with D1, Cx mid 80%, RCA moderate diffuse disease with long 70% and some left to right collateral. All vessels heavily calcified  Reviewed with Dr Malone - poor candidate for CABG. Will discuss staged PCI of LAD/Cx if able to tolerate DAPT without further pulmonary bleeding      Assessment and Plan:     * Hypotension  Had LEFT RP bleed overnight 3/27-3/28  Hypotension resolved.  NIBP with SBP 130s, probably closer to 160 given severe PAD and gradient between central pressure (approx 30mmHg) and NIBP during cath on 3/27/23.  Responded well to transfusion and  norepi, weaning off.  Cont DAPT.  Monitor CBC closely.    Squamous cell cancer of tongue  Per IM    Retroperitoneal hematoma  As per CAD section, ?predates PCI.  Nevertheless, seems to be doing well.  Transfuse for HGB>8    NSTEMI (non-ST elevated myocardial infarction)  As per CAD section    Coronary artery disease involving native coronary artery of native heart with unstable angina pectoris  Remote Hx PCI to RCA 2000. Troponin up to 2.2. Denies CP. EKG with mild NSSTT changes. Likely demand ischemia from hypotension and anemia. Poor candidate for anticoagulation or LHC with high bleeding risk and hemoptysis. Check echo - if stable ok for out patient ischemic evaluation when able to tolerate anticoagulation  3/21/23 Troponin increased to 3. Had rate related LBBB that has since resolved. Denies CP. EF 60% on echo with inferobasal HK. Tolerating heparin so far.   3/23/23: cath with prox LAD/D1 (?culprit), LCx and RCA stenoses.  Poor surgical candidate.    3/24/23: no further CP.  Tolerating DAPT.  Cont statin.  Will resume heparin IV.  Will discuss possible high risk PCI with wife when she arrives.    3/25/23: discussed with wife yesterday.  Cath permit signed.      3/27/23: PCI Prox LAD 3.0x24 Synergy BRADLEY and Mid LCx 2.75x20 Synergy BRADLEY    NSTEMI with ongoing unstable sxs  Severe Ca++ 3V CAD, normal LVx  Severe PAD with severe innominate and prox R SCA stenoses and R ЮЛИЯ ISR.  Successful MV PCI as above  Cont med rx  ASA 81mg qd indefinitely  Plavix for minimum 1 year (thru 3/2024). If absolutely necessary, DAPT can be stopped in 30 day (4/27/23).  Would consider prolonged rx if pt tolerates.    Statin  Dispo planning as per ICU team  Follow up with Dr. Bhatt          Hemoptysis  Per pulmonary. No further bleeding noted.  Tolerating DAPT    Primary hypertension  Cont med rx as tolerated.    Other hyperlipidemia  Cont atorva 80mg  Check lipids/LFT 6 months (Sept 2023)    Acute blood loss anemia  LEFT RP bleed  after LHC/PCI on 3/27/23 (with R CFA access).  As above  HGB stable    Acute on chronic respiratory failure with hypoxia  Mgmt per IM/pulm    Tracheostomy present  Per ENT/pulm    Elevated brain natriuretic peptide (BNP) level  Does not appear grossly vol overloaded at present.        VTE Risk Mitigation (From admission, onward)         Ordered     IP VTE HIGH RISK PATIENT  Once         03/18/23 1620     Place sequential compression device  Until discontinued         03/18/23 1620     Place NIKITA hose  Until discontinued         03/18/23 1620              Pt seen in ICU, critical care time 35min    Tim Bhatt MD  Cardiology  [facility] - Intensive Care

## 2023-03-29 NOTE — ASSESSMENT & PLAN NOTE
-Patient with acute on chronic hypoxic respiratory failure  -At home is on 5L O2 via trach and O2 sats typically in high 80s low 90s  -Sats presently similar, but is having episodes of hemoptysis  -Acute etiology is likely due to pneumonia, mucus plugging and hemoptysis.  -Consulted pulm-critical care and input appreciated  -Discussed with ENT on call at [facility] 3[place]19 and OK to keep at [facility]. Dr. French evaluated.  -Continue antibiotics as above.  -Continue supplemental O2 via Trach and wean as able. Placed on mechanical ventilation on 3/21 for respiratory distress and increasing oxygen requirements.  Slowly weaning at this time.  Appears more comfortable today. Weaned off vent on 3/23. No need for ventilator at night.  - Currently on 10 liters trach collar

## 2023-03-29 NOTE — ASSESSMENT & PLAN NOTE
Remote Hx PCI to RCA 2000. Troponin up to 2.2. Denies CP. EKG with mild NSSTT changes. Likely demand ischemia from hypotension and anemia. Poor candidate for anticoagulation or LHC with high bleeding risk and hemoptysis. Check echo - if stable ok for out patient ischemic evaluation when able to tolerate anticoagulation  3/21/23 Troponin increased to 3. Had rate related LBBB that has since resolved. Denies CP. EF 60% on echo with inferobasal HK. Tolerating heparin so far.   3/23/23: cath with prox LAD/D1 (?culprit), LCx and RCA stenoses.  Poor surgical candidate.    3/24/23: no further CP.  Tolerating DAPT.  Cont statin.  Will resume heparin IV.  Will discuss possible high risk PCI with wife when she arrives.    3/25/23: discussed with wife yesterday.  Cath permit signed.      3/27/23: PCI Prox LAD 3.0x24 Synergy BRADLEY and Mid LCx 2.75x20 Synergy BRADLEY    NSTEMI with ongoing unstable sxs  Severe Ca++ 3V CAD, normal LVx  Severe PAD with severe innominate and prox R SCA stenoses and R ЮЛИЯ ISR.  Successful MV PCI as above  Cont med rx  ASA 81mg qd indefinitely  Plavix for minimum 1 year (thru 3/2024). If absolutely necessary, DAPT can be stopped in 30 day (4/27/23).  Would consider prolonged rx if pt tolerates.    Statin  Dispo planning as per ICU team  Follow up with Dr. Bhatt

## 2023-03-29 NOTE — NURSING
Patient worked with physical therapy and up to the bedside chair - tolerated well - watching tv - vs stable see flowsheet - requested respiratory treatment

## 2023-03-29 NOTE — SUBJECTIVE & OBJECTIVE
Interval History: Hgb dropped this AM, responded to transfusion    Review of Systems   Constitutional:  Negative for chills and fever.   Respiratory:  Positive for cough and shortness of breath.    Cardiovascular:  Negative for chest pain and leg swelling.   Gastrointestinal:  Negative for abdominal distention and abdominal pain.   Musculoskeletal:  Positive for arthralgias.   Objective:     Vital Signs (Most Recent):  Temp: 97.8 °F (36.6 °C) (03/29/23 1100)  Pulse: 80 (03/29/23 1600)  Resp: 16 (03/29/23 1600)  BP: (!) 101/55 (03/29/23 1600)  SpO2: (!) 90 % (03/29/23 1600)   Vital Signs (24h Range):  Temp:  [97.5 °F (36.4 °C)-99.5 °F (37.5 °C)] 97.8 °F (36.6 °C)  Pulse:  [] 80  Resp:  [13-34] 16  SpO2:  [83 %-98 %] 90 %  BP: ()/(52-79) 101/55     Weight: 68 kg (150 lb)  Body mass index is 22.81 kg/m².    Intake/Output Summary (Last 24 hours) at 3/29/2023 1622  Last data filed at 3/29/2023 1500  Gross per 24 hour   Intake 943.33 ml   Output 1175 ml   Net -231.67 ml      Physical Exam  Constitutional:       General: He is not in acute distress.     Appearance: He is ill-appearing. He is not toxic-appearing or diaphoretic.   Cardiovascular:      Rate and Rhythm: Normal rate and regular rhythm.      Heart sounds: No murmur heard.    No gallop.   Pulmonary:      Comments: Trach, coarse sounds bilaterally  Abdominal:      General: Bowel sounds are normal. There is no distension.      Palpations: Abdomen is soft.      Tenderness: There is no abdominal tenderness.       Significant Labs: All pertinent labs within the past 24 hours have been reviewed.    Significant Imaging: I have reviewed all pertinent imaging results/findings within the past 24 hours.

## 2023-03-29 NOTE — PHYSICIAN QUERY
PT Name: Fareed Richard Jr.  MR #: [id number]     DOCUMENTATION CLARIFICATION     CDS/: Autumn Cha  RN, BSN             Contact information: [email or web address]   This form is a permanent document in the medical record.     Query Date: March 29, 2023    By submitting this query, we are merely seeking further clarification of documentation.  Please utilize your independent clinical judgment when addressing the question(s) below.  The Medical Record contains the following:  Indicators Supporting Clinical Findings Location in Medical Record   x HR         RR          BP        Temp Temp:  [97.8 °F (36.6 °C)] 97.8 °F (36.6 °C)  Pulse:  [85-94] 94  Resp:  [20] 20  SpO2:  [94 %-100 %] 98 %  BP: ()/(53-68) 105/68    Temp:  [97.6 °F (36.4 °C)-99 °F (37.2 °C)] 97.6 °F (36.4 °C)  Pulse:  [] 100  Resp:  [16-20] 18  SpO2:  [87 %-100 %] 89 %  BP: ()/(40-68) 85/58    Temp:  [97.6 °F (36.4 °C)-98.5 °F (36.9 °C)] 98.4 °F (36.9 °C)  Pulse:  [] 86  Resp:  [17-29] 26  SpO2:  [89 %-100 %] 99 %  BP: ()/(42-83) 116/73    Temp:  [97.5 °F (36.4 °C)-98.7 °F (37.1 °C)] 97.5 °F (36.4 °C)  Pulse:  [] 108  Resp:  [16-30] 25  SpO2:  [85 %-100 %] 94 %  BP: ()/(44-73) 86/53    Temp:  [95.8 °F (35.4 °C)-98.8 °F (37.1 °C)] 98.8 °F (37.1 °C)  Pulse:  [] 106  Resp:  [20-43] 22  SpO2:  [75 %-100 %] 86 %  BP: ()/() 104/54  Arterial Line BP: (89-94)/(40-42) 94/42   H&P, 3/18            PN, 3/19              PN, 3/20          PN, 3/21            PN, 3/28    Lactic Acid          Procalcitonin       x WBC           Bands          CRP  03/27/23 03:55 03/28/23 04:27 03/28/23 06:17 03/29/23 02:52 03/29/23 09:44   WBC 11.80 22.23 (H) 18.69 (H) 8.89 8.92    LAB           x Culture(s) Enterococcus Faecalis    Candida Glabrata  Achromobacter Xylosoxidans Subsp. Dentrificans  Stenotrophomonas (X.) Maltophilia   Urine Culture, 3/18    Respiratory Culture, 3/22    AMS, Confusion, LOC, etc.     x Organ  Dysfunction/Failure Acute respiratory failure with hypoxia    hemorrhagic shock on 3/27 w/ L RP bleed. No evidence of continued bleeding on CTA.   Pulmonary, 3/20    PN, 3/28   x Bacteremia or Sepsis / Septic could also be sepsis due to UTI (given urine culture growing Enterococcus) or cardiogenic (given troponin bump and severe back pain)    Transferred to ICU 3/19 when markedly hypotensive.  Unclear if due to hemorrhage, cardiogenic or septic shock    Also thought to be septic due to possible UTI/pneumonia     admitted to ICU for mixed hemorrhagic/septic shock   PN, 3/20      PN, 3/26        Significant event note, 3/28   x Known or Suspected Source of Infection documented UTI    Also thought to be septic due to possible UTI/pneumonia    PN, 3/20    PN, 3/26    (Failed) Outpatient Treatment      Medication     x Treatment significant hypotension (SBP in 70s).  PRBC and saline bolus infusing    Treat for pneumonia (nosocomial pathogens)  Nebulized bronchodilators given COPD/emphysema changes     await sensitivities.  Blodo cultures negative thus far.  Continue vancomycin and cefepime for now.    -Urine culture growing Enterococcus > 100,000 cfu/ml.  Sensitive to Vanc/Ampicillin  -Continue broad spectrum antibiotics for now and tailor based off results.    NORepinephrine 32 mg in dextrose 5 % (D5W) 250 mL infusion       Pt went into hemorrhagic shock on 3/27 w/ L RP bleed. No evidence of continued bleeding on CTA. Levophed weaned.     ED, 3/18    Pulmonology, 3/19      PN, 3/20          MAR, 3/27    PN, 3/28   x Other having blood y sputum in his trach on Wednesday. Went thursdsay to have a trach change and the dr was unale to scope his mouth above the trach due to him gagging but did change the trach. Continued to have the pink sputum until 0300 today and it had bright red blood from trach. Denied any pain or SOB    Also concerns for cardiogenic with an NSTEMI and being on nitro    CTA resulted showing a left  sided RP bleed, relatively stable with no active extravasation noted. A short term follow up CT as clinically warranted was reccommended.     Overnight pt went into shock and found to have L retroperitoneal hematoma ED, 3/18        PN, 3/26    Significant event note, 3/28      PN, 3/28            Provider, please specify if the Sepsis diagnosis is confirmed based on the above clinical findings.  [   ] Sepsis Ruled Out     [   ] Sepsis due to (please select all that apply):    [   ] Enterococcus Faecalis UTI    [   ] unknown organism    [   ] Candida Glabrata  [   ] Achromobacter Xylosoxidans Subsp. Dentrificans  [   ] Stenotrophomonas (X.) Maltophilia     [   ] Sepsis with Septic Shock     [ x ] Clinically Undetermined         Please document in your progress notes daily for the duration of treatment until resolved and include in your discharge summary.

## 2023-03-29 NOTE — PLAN OF CARE
Problem: Occupational Therapy  Goal: Occupational Therapy Goal  Description: Goals to be met by: 4/12/23     Patient will increase functional independence with ADLs by performing:    UE Dressing with Modified Reynolds and Supervision.  LE Dressing with Modified Reynolds and Supervision.  Grooming while standing at sink with Stand-by Assistance and Assistive Devices as needed.  Toileting from toilet with Modified Reynolds, Supervision, and Assistive Devices as needed for hygiene and clothing management.   Sitting at edge of bed x30 minutes with Modified Reynolds and Supervision.  Rolling to Bilateral with Modified Reynolds.   Supine to sit with Modified Reynolds.  Step transfer with Modified Reynolds and Supervision  Toilet transfer to toilet with Supervision.  Upper extremity exercise program x10 reps per handout, with assistance as needed.    Outcome: Ongoing, Progressing     The patient will benefit from HH OT. No DME needs.

## 2023-03-29 NOTE — NURSING
Patient resting in bed connected to cardiac monitoring - see flowsheet for details - vital signs within parameters - remains off Levo - trach collar on - Shiley trach- with creamy thick tan secretions - strong cough - lungs with fine crackles - picc line to the right arm- blood completed and line flushed - abdomen is soft with bowel sounds - voids per urinal -right groin dressing clean dry and intact- no hematoma or bleeding noted  foam to the sacral area intact - ecchymotic areas to the bilateral arms - heels red but blanchable - skin dry - moves well in the bed - peripheral pulses present-plans to get out of bed to the chair today

## 2023-03-29 NOTE — NURSING
[facility] [place]  Nurses Note -- 4 Eyes      3/29/2023       Skin assessed on: Q Shift      [x] No Pressure Injuries Present    [x]Prevention Measures Documented    [] Yes LDA  for Pressure Injury Previously documented     [] Yes New Pressure Injury Discovered   [] LDA for New Pressure Injury Added      Attending RN:  Vilma Rubin RN     Second RN:  Brenda TRIANA

## 2023-03-29 NOTE — CARE UPDATE
[facility]  Nurses Note -- 4 Eyes      3/29/2023       Skin assessed on: Q Shift      [x] No Pressure Injuries Present    [x]Prevention Measures Documented    [] Yes LDA  for Pressure Injury Previously documented     [] Yes New Pressure Injury Discovered   [] LDA for New Pressure Injury Added      Attending RN:  Yadi Fenton RN     Second RN:  Caroline Galindo RN

## 2023-03-29 NOTE — PLAN OF CARE
The patient has healing puncture wounds from cath lab - without redness or bleeding noted - dressings dry clean and intact - no white count noted on am labs

## 2023-03-29 NOTE — NURSING
Patient remains sitting up in the bedside chair - no complaints of pain - will continue to monitor

## 2023-03-29 NOTE — PROGRESS NOTES
[facility]  Progress Note    Patient Name: Fareed Richard Jr.  MRN: [id number]  Patient Class: IP- Inpatient   Admission Date: 3/18/2023  Length of Stay: 10 days  Attending Physician: Kelby Sargent MD  Primary Care Provider: Kodi Tubbs MD        Subjective:     Principal Problem:Hypotension        HPI:  Fareed Richard Jr. 73 y.o. 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  Per his wife 4 days ago he began to have pink tinged sputum via his trach.  He was seen by his ENT 2 days ago with a scope exam and a trach exchange without evidence of bleeding.  This morning at 3:00 a.m. he developed bright red blood via trach which resolved without intervention.  It is since not recurred.  He takes a daily aspirin.  He receives all medications via G-tube.  His tube feeding regimen consists of 6 cans of Isosource daily with 120 cc free water boluses.  He denies fever chest pain shortness of breath nausea vomiting abdominal pain leg swelling syncope dizziness dysuria melena hematuria hematemesis.    In the ED, hemoglobin 7.6 INR 1.0 BUN 50 creatinine 0.7  troponin negative BRCA negative O positive type and screen chest x-ray without detrimental change hypotensive to 85/54.      Overview/Hospital Course:  74 yo M w squamous CA of tongue s/p glossectomy and reconstructive flap with trach, CAD, chronic hypoxic respiratory failure (on 5L at home) and with peg presented for eval of hemoptysis 2 days after trach change in clinic.  Transferred to ICU 3/19 when markedly hypotensive.  Unclear if due to hemorrhage, cardiogenic or septic shock.  Did require PRBC and TXA nebs but didn't seem like sufficient bleeding to cause shock.  Bleeding has stopped and ENT is on board and following.  CTA shows either significant mucus plugging or bibasilar pneumonia - pulmonology  favored pneumonia.  Also has urine culture growing Enterococcus.  He is on broad spectrum antibiotics. Troponin checked due to hypotension and it was 1.17.  Cards consulted and feel it's demand due to ischemia and hypotension and recommend outpatient stress. AM cortisol was low so ordered cosyntropin stim test which shows an adequate adrenal response to r/o adrenal insufficiency.  Developed severe shoulder pain and hypoxia, increasing troponin and pulmonary edema on CXR - shoulder pain likely anginal equivalent. Discussed with Cardiology, started heparin and nitro drip. Required nitro drip for chest pain. Tolerating heparin drip without evidence of further tracheostomy bleeding. Further discussions with Cardiology, and brought patient for [facility]/angiogram on 3/23 which showed distal LM 30%, mild LAD 90% bifurcation lesion with D1, Cx mid 80%, RCA moderate diffuse disease with long 70% and some left to right collateral. All vessels heavily calcified. Not a candidate for CABG but plan for staged PCI. Continues on heparin drip, asa/plavix and tolerating. Off pressor support/nitro drip. No further bleeding and Hb remains stable. Had episodes of hypoxia/respiratory distress after vomiting and was intermittently placed on ventilator -  now back to trach collar close to home O2 requirements. Plan for angiogram/PCI on 3/27 with Dr. Bhatt.      Interval History: Hgb dropped this AM, responded to transfusion    Review of Systems   Constitutional:  Negative for chills and fever.   Respiratory:  Positive for cough and shortness of breath.    Cardiovascular:  Negative for chest pain and leg swelling.   Gastrointestinal:  Negative for abdominal distention and abdominal pain.   Musculoskeletal:  Positive for arthralgias.   Objective:     Vital Signs (Most Recent):  Temp: 97.8 °F (36.6 °C) (03/29/23 1100)  Pulse: 80 (03/29/23 1600)  Resp: 16 (03/29/23 1600)  BP: (!) 101/55 (03/29/23 1600)  SpO2: (!) 90 % (03/29/23 1600)   Vital  Signs (24h Range):  Temp:  [97.5 °F (36.4 °C)-99.5 °F (37.5 °C)] 97.8 °F (36.6 °C)  Pulse:  [] 80  Resp:  [13-34] 16  SpO2:  [83 %-98 %] 90 %  BP: ()/(52-79) 101/55     Weight: 68 kg (150 lb)  Body mass index is 22.81 kg/m².    Intake/Output Summary (Last 24 hours) at 3/29/2023 1622  Last data filed at 3/29/2023 1500  Gross per 24 hour   Intake 943.33 ml   Output 1175 ml   Net -231.67 ml      Physical Exam  Constitutional:       General: He is not in acute distress.     Appearance: He is ill-appearing. He is not toxic-appearing or diaphoretic.   Cardiovascular:      Rate and Rhythm: Normal rate and regular rhythm.      Heart sounds: No murmur heard.    No gallop.   Pulmonary:      Comments: Trach, coarse sounds bilaterally  Abdominal:      General: Bowel sounds are normal. There is no distension.      Palpations: Abdomen is soft.      Tenderness: There is no abdominal tenderness.       Significant Labs: All pertinent labs within the past 24 hours have been reviewed.    Significant Imaging: I have reviewed all pertinent imaging results/findings within the past 24 hours.      Assessment/Plan:      * Hypotension  Presented with hypotension and bleeding from tracheostomy/hemoptysis.  Patient does have lower blood pressure readings however this blood pressure was concerning in the 70s.  Patient did have some bleeding but did not suspect it was hemorrhagic.  Also thought to be septic due to possible UTI/pneumonia and being treated for antibiotics.  Also concerns for cardiogenic with an NSTEMI and being on nitro.  He was treated appropriately with antibiotics for his UTI/pneumonia and was also treated for NSTEMI.  Intermittently requiring pressor support with sedation after he was put on the ventilator.  Now on trach collar.  Hypotension has now resolved and he is doing well.    Pt went into hemorrhagic shock on 3/27 w/ L RP bleed. No evidence of continued bleeding on CTA. Levophed weaned.   "      Retroperitoneal hematoma  See above      NSTEMI (non-ST elevated myocardial infarction)  -Denies chest pain but on 3/19 did have unexplained hypotension with severe back/shoulder pain  -Troponin on admit normal, but on 3/19 bumped to 1.7 and then 2.2 on 3/20.  -No ajmil ischemic change on EKG  -Echo showed EF 60%, inferobasal hypokinesis, indeterminate diastolic function  -Cardiology consulted and input appreciated  -3/19 - Cardiology suspects this is due to demand ischemia associated with his anemia and hypotension.  Further, he is a poor candidate for angiogram given his hemoptysis and inability to treat with blood thinning medications.  Recommended outpatient stress testing  -Holding aspirin given hemoptysis and metoprolol given hypotension  -Continue statin.  Resume BB as able. On nitro drip for chest pain. - having difficulty weaning off. He is currently cleared for DAPT as no further bleeding issues at this time. Updated Cardiology.   -Patient underwent LHC/angiogram on 2/23 - findings below.  "3/23/23 LHC - EDP 13, distal LM 30%, mild LAD 90% bifurcation lesion with D1, Cx mid 80%, RCA moderate diffuse disease with long 70% and some left to right collateral. All vessels heavily calcified     Reviewed with Dr Malone - poor candidate for CABG. Will discuss staged PCI of LAD/Cx if able to tolerate DAPT without further pulmonary bleeding"    - Currently on ASA/Plavix and low dose heparin infusion. Cardiology discussing intervention with patient and wife. Plans for LHC on Monday 3/27.    Underwent LHC w/ LCx and LAD stents        Anxiety        UTI (urinary tract infection)  -Urine culture growing Enterococcus > 100,000 cfu/ml.  Sensitive to Vanc/Ampicillin  -Continue broad spectrum antibiotics for now and tailor based off results.  - has completed antibiotics.    Hemoptysis  -Treatment as above. This has resolved.    Elevated brain natriuretic peptide (BNP) level  Hx noted      PEG (percutaneous endoscopic " gastrostomy) status  -Continue medications via PEG. 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      Acute respiratory failure with hypoxia  Patient with Hypoxic Respiratory failure which is Acute on chronic.  he is on home oxygen at 2 LPM. Supplemental oxygen was provided and noted- Oxygen Concentration (%):  [40] 40.   Signs/symptoms of respiratory failure include- tachypnea and increased work of breathing. Contributing diagnoses includes - Aspiration and Pneumonia Labs and images were reviewed. Patient Has not had a recent ABG. Will treat underlying causes and adjust management of respiratory failure as follows   - changed out trach and now placed to ventilator for positive pressure  - had secretions in trach with exchange. Appears to be more comfortable now.    Acute on chronic respiratory failure with hypoxia  -Patient with acute on chronic hypoxic respiratory failure  -At home is on 5L O2 via trach and O2 sats typically in high 80s low 90s  -Sats presently similar, but is having episodes of hemoptysis  -Acute etiology is likely due to pneumonia, mucus plugging and hemoptysis.  -Consulted pulm-critical care and input appreciated  -Discussed with ENT on call at [facility] 3/19 and OK to keep at [facility]. Dr. French evaluated.  -Continue antibiotics as above.  -Continue supplemental O2 via Trach and wean as able. Placed on mechanical ventilation on 3/21 for respiratory  distress and increasing oxygen requirements.  Slowly weaning at this time.  Appears more comfortable today. Weaned off vent on 3/23. No need for ventilator at night.  - Currently on 10 liters trach collar    Acute blood loss anemia  -Hb on admit 7.6 and this morning 8.0  -Baseline Hb 8-9.  -Presented w intermittent hemoptysis via trach x 2 days. Had another episode on morning of 3/19/2023  -Seen at ENT 2 days prior to admit (Dr. Smalls) with exam without source. Trache exchanged at that time.   -Ferritin only 84 and Tsat 18% - consistent with iron deficiency  -Platelets and PT/INR are normal.  -Treated with TXA nebulizer and bleeding has stopped  -Started FeSO4 which he will need at discharge  -Repeat cbc in AM. Hb 7.7 but stable, no further evidence of bleeding. Again 7.1 but no further bleeding.   Stable at 8.2 - 8.3  - no further episodes of bleeding and tolerating heparin/plavix    Pt developed L sided RP hematoma. He was having L-sided pain prior to [facility] suggesting this was not the culprit. Went into shock which resolved with transfusion  - close monitoring    Other hyperlipidemia  -Continue home statin    Primary hypertension  -BP typically in the 90s to low 100s systolic per chart review.   -Noted hypotension 3/19 which is addressed above.  -Holding home metoprolol and amlodipine    Coronary artery disease involving native coronary artery of native heart with unstable angina pectoris  -Denies chest pain but on 3/19 did have unexplained hypotension with severe back/shoulder pain  -Troponin on admit normal, but on 3/19 bumped to 1.7 and then 2.2 on 3/20.  -No jamil ischemic change on EKG  -Echo showed EF 60%, inferobasal hypokinesis, indeterminate diastolic function  -Cardiology consulted and input appreciated  -Cardiology suspects this is due to demand ischemia associated with his anemia and hypotension.  Further, he is a poor candidate for angiogram given his hemoptysis and inability to treat with blood  "thinning medications.  Recommended outpatient stress testing  -Holding aspirin given hemoptysis and metoprolol given hypotension  -Continue statin.  Resume BB as able. On nitro drip for chest pain. - having difficulty weaning off. He is currently cleared for DAPT as no further bleeding issues at this time. Updated Cardiology.   -Patient underwent LHC/angiogram on 2/23 - findings below.  "3/23/23 LHC - EDP 13, distal LM 30%, mild LAD 90% bifurcation lesion with D1, Cx mid 80%, RCA moderate diffuse disease with long 70% and some left to right collateral. All vessels heavily calcified     Reviewed with Dr Malone - poor candidate for CABG. Will discuss staged PCI of LAD/Cx if able to tolerate DAPT without further pulmonary bleeding"    - Currently on ASA/Plavix and low dose heparin infusion. Cardiology discussing intervention with patient and wife today. No intervention planned for today.    S/p LAD/Cx PCI      Squamous cell cancer of tongue  -Diagnosed 12/15/21 via FNA.  Underwent total glossectomy, bilateral neck dissection, bilateral cervical facial flaps and anterolateral thigh flap reconstruction of glossectomy defect 1/4/22  -Completed adjuvant chemoradiation  -Followed by oncology and ENT outpt. No longer on chemo or radiation.   -Had trach changed by ENT 2 days prior to admit and scope at that time showed no signs of bleeding.   -Treatment as above.      VTE Risk Mitigation (From admission, onward)         Ordered     IP VTE HIGH RISK PATIENT  Once         03/18/23 1620     Place sequential compression device  Until discontinued         03/18/23 1620     Place NIKITA hose  Until discontinued         03/18/23 1620                Discharge Planning   NISA:      Code Status: Full Code   Is the patient medically ready for discharge?:     Reason for patient still in hospital (select all that apply): Patient trending condition, Laboratory test, Treatment, Consult recommendations and Pending disposition  Discharge Plan A: " Home with family   Discharge Delays: None known at this time        Critical care time spent on the evaluation and treatment of severe organ dysfunction, review of pertinent labs and imaging studies, discussions with consulting providers and discussions with patient/family: 45 minutes.      Kelby Sargent MD  Department of Hospital Medicine   [facility] - Intensive Care

## 2023-03-29 NOTE — PT/OT/SLP EVAL
Physical Therapy Evaluation    Patient Name:  Fareed Richard Jr.   MRN:  [id number]    Recommendations:     Discharge Recommendations: home health PT (FAmily support)   Discharge Equipment Recommendations: none   Barriers to discharge:  Pt in ICu with increased O2 demand    Assessment:     Fareed Richard Jr. is a 73 y.o. male admitted with a medical diagnosis of Hypotension.  He presents with the following impairments/functional limitations: weakness, gait instability, impaired cardiopulmonary response to activity, impaired endurance, impaired balance, impaired coordination, decreased safety awareness, impaired self care skills, impaired functional mobility, decreased coordination .    Rehab Prognosis: Fair; patient would benefit from acute skilled PT services to address these deficits and reach maximum level of function.    Recent Surgery: Procedure(s) (LRB):  Angioplasty-coronary (Left)  Aortogram, Aortic Arch  AORTOGRAM, WITH SERIALOGRAPHY (N/A)  Stent, Coronary, Multi Vessel 2 Days Post-Op    Plan:     During this hospitalization, patient to be seen  (2-3x/wk) to address the identified rehab impairments via gait training, therapeutic activities, therapeutic exercises and progress toward the following goals:    Plan of Care Expires:  04/12/23    Subjective     Chief Complaint: neck pain  Patient/Family Comments/goals: agreeable to sit up in chair  Pain/Comfort:  Pain Rating 1:  (not rated)  Location - Orientation 1:  (next)  Pain Addressed 1: Reposition, Distraction, Cessation of Activity, Nurse notified (manual therapy)    Patients cultural, spiritual, Scientologist conflicts given the current situation: no    Living Environment:  Pt lives with his spouse in a [facility] with 5STE, HR available  Prior to admission, patients level of function was Mod I with ambulation and ADL's per pt.  Equipment used at home: walker, rolling, wheelchair, oxygen, bedside commode, hospital bed, cane, straight, shower chair.  DME owned (not  currently used): none.  Upon discharge, patient will have assistance from Spouse.    Objective:   Pt with trach, mouthing words, able to write  Communicated with nsg prior to session.  Patient found HOB elevated with Tracheostomy (ICU monitoring, trach collar 10L 40%)  upon PT entry to room.    General Precautions: Standard, fall, respiratory  Orthopedic Precautions:N/A   Braces: N/A  Respiratory Status:  Trach collar 10L 40%    Exams:  Cognitive Exam:  Patient is oriented to Person, Place, Time, and Situation  Gross Motor Coordination:  impaired 2/2 weakness and deconditioning  Postural Exam:  Patient presented with the following abnormalities:    -       Rounded shoulders  -       Forward head  -       Kyphosis  Sensation:    -       Intact  light/touch B LE's  Skin Integrity/Edema:      -       Skin integrity: Bruising of UE's  -       Edema: None noted    RLE ROM: WFL  RLE Strength: WFL  LLE ROM: WFL  LLE Strength: WFL    Functional Mobility:  Bed Mobility:     Scooting: contact guard assistance  Supine to Sit: contact guard assistance  Transfers:     Sit to Stand:  minimum assistance with hand-held assist  Bed to chair: Min A/HHA approx 4 steps from bed to chair via step transfer  Gait: 4 step with Min A/HHA  Balance: FAir+ sit, Fair stand      AM-PAC 6 CLICK MOBILITY  Total Score:16       Treatment & Education:  Pt received STM/MFR to Posterior cervical and Scapular mm in sitting at EOB.   Dangle protocol:  Pt sat at EOB with SBA/CGA.  , /60, SPO2 83-88%, Increased recovery time sitting at EOB, O2 titrated to 15L per Nursing.    Patient left up in chair with all lines intact, call button in reach, and nsg notified.    GOALS:   Multidisciplinary Problems       Physical Therapy Goals          Problem: Physical Therapy    Goal Priority Disciplines Outcome Goal Variances Interventions   Physical Therapy Goal     PT, PT/OT Ongoing, Progressing     Description: Goals to be met by: 4/12/23     Patient  will increase functional independence with mobility by performin. Supine to sit with Modified Pettis  2. Sit to stand transfer with Supervision  3. Bed to chair transfer with Supervision   4. Gait  x 10-15 feet with Contact Guard Assistance using Rolling Walker.   5. Lower extremity exercise program x10 reps per handout, with supervision                         History:     Past Medical History:   Diagnosis Date    Cancer     skin to Rt forearm and face    COPD (chronic obstructive pulmonary disease)     Hyperlipidemia     Hypertension     Pseudoaneurysm        Past Surgical History:   Procedure Laterality Date    ABDOMINAL SURGERY      stents placed in liver and large intestines, per patient    ANGIOGRAPHY OF AORTIC ARCH  3/27/2023    Procedure: Aortogram, Aortic Arch;  Surgeon: Tim Bhatt MD;  Location: [facility] CATH LAB;  Service: Cardiology;;    AORTOGRAPHY WITH SERIALOGRAPHY N/A 3/27/2023    Procedure: AORTOGRAM, WITH SERIALOGRAPHY;  Surgeon: Tim Bhatt MD;  Location: [facility] CATH LAB;  Service: Cardiology;  Laterality: N/A;    CAROTID STENT      COLONOSCOPY N/A 2022    Procedure: COLONOSCOPY;  Surgeon: Donnie Peterson MD;  [facility]  Service: Endoscopy;  Laterality: N/A;    COLONOSCOPY N/A 2022    Procedure: COLONOSCOPY;  Surgeon: Joy Cabrera MD;  Location: [facility] (2ND FLR);  Service: Endoscopy;  Laterality: N/A;    CORONARY STENT PLACEMENT  2000    DISSECTION OF NECK Bilateral 2022    Procedure: DISSECTION, NECK;  Surgeon: Naeem Smalls MD;  Location: [facility] OR 2ND FLR;  Service: ENT;  Laterality: Bilateral;    ESOPHAGOGASTRODUODENOSCOPY N/A 2022    Procedure: EGD (ESOPHAGOGASTRODUODENOSCOPY);  Surgeon: Donnie Peterson MD;  Location: [facility] (2ND FLR);  Service: Endoscopy;  Laterality: N/A;    ESOPHAGOGASTRODUODENOSCOPY N/A 2022    Procedure: EGD (ESOPHAGOGASTRODUODENOSCOPY);  Surgeon: Joy Cabrera MD;  Location: [facility]  (2ND FLR);  Service: Endoscopy;  Laterality: N/A;    ESOPHAGOGASTRODUODENOSCOPY W/ PEG N/A 01/04/2022    Procedure: EGD, WITH PEG TUBE INSERTION;  Surgeon: Anthony Calabrese MD;  [facility];  Service: General;  Laterality: N/A;    EYE SURGERY      Cataract bilateral    femoral stents      bilateral    FLAP PROCEDURE N/A 01/03/2022    Procedure: CREATION, FREE FLAP;  Surgeon: Naeem Smalls MD;  Location: [facility] OR [facility] FLR;  Service: ENT;  Laterality: N/A;    FLAP PROCEDURE Right 01/04/2022    Procedure: CREATION, FREE FLAP;  Surgeon: Stacey Conde MD;  [facility];  Service: ENT;  Laterality: Right;  Ischemic start 1203  Ischemic stop 1353    GLOSSECTOMY N/A 01/04/2022    Procedure: GLOSSECTOMY;  Surgeon: Naeem Smalls MD;  [facility];  Service: ENT;  Laterality: N/A;    PERCUTANEOUS TRANSLUMINAL BALLOON ANGIOPLASTY OF CORONARY ARTERY Left 3/27/2023    Procedure: Angioplasty-coronary;  Surgeon: Tim Bhatt MD;  Location: [facility] CATH LAB;  Service: Cardiology;  Laterality: Left;  not before 9am, R rad access, 6 Fr EBU 3.5 guide    RADICAL NECK DISSECTION Left 01/03/2022    Procedure: DISSECTION, NECK, RADICAL;  Surgeon: Naeem Smalls MD;  Location: [facility] OR [address] [place] [address];  Service: ENT;  Laterality: Left;    SKIN BIOPSY      SKIN CANCER EXCISION      STENT, CORONARY, MULTI VESSEL  3/27/2023    Procedure: Stent, Coronary, Multi Vessel;  Surgeon: Tim Bhatt MD;  Location: [facility] CATH LAB;  Service: Cardiology;;    TRACHEOTOMY N/A 01/04/2022    Procedure: TRACHEOTOMY;  Surgeon: Naeem Smalls MD;  [facility];  Service: ENT;  Laterality: N/A;    VASCULAR SURGERY         Time Tracking:     PT Received On: 03/29/23  PT Start Time: 1055     PT Stop Time: 1115  PT Total Time (min): 20 min     Billable Minutes: Evaluation 8 and Therapeutic Activity 12 co-evaluation with OT      03/29/2023

## 2023-03-29 NOTE — SUBJECTIVE & OBJECTIVE
Review of Systems   Reason unable to perform ROS: trach.   Objective:     Vital Signs (Most Recent):  Temp: 97.8 °F (36.6 °C) (03/29/23 1100)  Pulse: 79 (03/29/23 1530)  Resp: 16 (03/29/23 1530)  BP: (!) 99/54 (03/29/23 1530)  SpO2: (!) 91 % (03/29/23 1530)   Vital Signs (24h Range):  Temp:  [97.5 °F (36.4 °C)-99.5 °F (37.5 °C)] 97.8 °F (36.6 °C)  Pulse:  [] 79  Resp:  [13-34] 16  SpO2:  [83 %-98 %] 91 %  BP: ()/(52-79) 99/54     Weight: 68 kg (150 lb)  Body mass index is 22.81 kg/m².     SpO2: (!) 91 %         Intake/Output Summary (Last 24 hours) at 3/29/2023 1556  Last data filed at 3/29/2023 1500  Gross per 24 hour   Intake 943.33 ml   Output 1175 ml   Net -231.67 ml         Lines/Drains/Airways       Peripherally Inserted Central Catheter Line  Duration             PICC Triple Lumen 03/20/23 2145 right basilic 8 days              Drain  Duration                  Gastrostomy/Enterostomy 01/04/22 Percutaneous endoscopic gastrostomy (PEG)  days              Airway  Duration             Adult Surgical Airway 03/16/23 1014 Shiley Uncuffed 6.0 13 days                    Physical Exam  Constitutional:       General: He is not in acute distress.     Appearance: He is well-developed. He is ill-appearing. He is not toxic-appearing or diaphoretic.   HENT:      Head: Normocephalic and atraumatic.   Eyes:      General: No scleral icterus.     Extraocular Movements: Extraocular movements intact.      Conjunctiva/sclera: Conjunctivae normal.      Pupils: Pupils are equal, round, and reactive to light.   Neck:      Thyroid: No thyromegaly.      Vascular: No JVD.      Trachea: No tracheal deviation.      Comments: Trach in place  Cardiovascular:      Rate and Rhythm: Normal rate and regular rhythm.      Heart sounds: S1 normal and S2 normal. Heart sounds are distant. No murmur heard.    No friction rub. No gallop.   Pulmonary:      Effort: Pulmonary effort is normal. No respiratory distress.      Breath  sounds: Normal breath sounds. No stridor. No wheezing, rhonchi or rales.   Chest:      Chest wall: No tenderness.   Abdominal:      General: There is no distension.      Palpations: Abdomen is soft.   Musculoskeletal:         General: No swelling or tenderness. Normal range of motion.      Cervical back: Normal range of motion and neck supple. No rigidity.      Right lower leg: No edema.      Left lower leg: No edema.   Skin:     General: Skin is warm and dry.      Coloration: Skin is not jaundiced.   Neurological:      General: No focal deficit present.      Mental Status: He is oriented to person, place, and time.      Cranial Nerves: No cranial nerve deficit.   Psychiatric:         Mood and Affect: Mood normal.         Behavior: Behavior normal.       Current Medications:   aspirin  81 mg Oral Daily    atorvastatin  80 mg Per G Tube Daily    clopidogreL  75 mg Oral Daily    ferrous sulfate  1 tablet Per G Tube Daily    furosemide  40 mg Per G Tube BID    glycopyrrolate  1 mg Per G Tube TID    LIDOcaine  2 patch Transdermal Q24H    melatonin  9 mg Per G Tube Nightly    metoprolol tartrate  25 mg Per G Tube BID    polyethylene glycol  17 g Oral BID    sodium chloride 0.9%  10 mL Intravenous Q6H      dexmedeTOMIDine (Precedex) infusion (titrating) Stopped (03/24/23 1100)    nitroGLYCERIN      NORepinephrine bitartrate-D5W 0.2 mcg/kg/min (03/28/23 0519)    sodium chloride 0.9% 250 mL/hr at 03/28/23 0519     sodium chloride, acetaminophen, acetaminophen, acetaminophen, albuterol-ipratropium, atropine, fentaNYL, guaiFENesin 100 mg/5 ml, HYDROcodone-acetaminophen, HYDROcodone-acetaminophen, hydrOXYzine HCL, insulin aspart U-100, morphine, naloxone, nitroGLYCERIN, nitroGLYCERIN, nitroGLYCERIN, ondansetron, ondansetron, oxyCODONE, sodium chloride 0.9%, sodium chloride 0.9%, Flushing PICC Protocol **AND** sodium chloride 0.9% **AND** sodium chloride 0.9%    Laboratory (all labs reviewed):  CBC:  Recent Labs   Lab  03/27/23  0355 03/27/23  2038 03/28/23  0427 03/28/23  0617 03/28/23  0932 03/29/23  0252 03/29/23  0944   WBC 11.80  --  22.23 H 18.69 H  --  8.89 8.92   Hemoglobin 8.4 L   < > 7.4 L 8.1 L 7.9 L 6.5 L 8.0 L   Hematocrit 26.8 L  --  23.5 L 24.3 L  --  19.8 LL 25.2 L   Platelets 301  --  309 269  --  183 170    < > = values in this interval not displayed.         CHEMISTRIES:  Recent Labs   Lab 05/10/22  1031 05/11/22  0412 05/12/22  0343 05/14/22  0724 05/17/22  1007 08/24/22  1000 03/22/23  0233 03/23/23  0332 03/24/23  0357 03/25/23  0429 03/26/23  0357 03/27/23  0355 03/28/23  0205 03/28/23  0427 03/29/23  0944   Glucose 130 H 103 110 96 162 H   < > 181 H 178 H  --   --   --  139 H 153 H 159 H 123 H   Sodium 140 139 135 L 136 133 L   < > 137 135 L  --   --   --  141 140 138 144   Potassium 4.6 5.0 4.4 4.1 5.0   < > 4.2 3.8  --   --   --  3.4 L 4.4 4.4 4.1   BUN 22 26 H 24 H 18 15   < > 24 H 33 H  --   --   --  38 H 42 H 44 H 46 H   Creatinine 0.8 0.7 0.7 0.7 0.7   < > 0.8 0.7  --   --   --  0.9 1.3 1.3 1.0   eGFR if non African American >60.0 >60.0 >60.0 >60 >60.0  --   --   --   --   --   --   --   --   --   --    eGFR  --   --   --   --   --    < > >60 >60  --   --   --  >60 58 A 58 A >60   Calcium 9.7 9.7 9.1 9.6 9.5   < > 8.1 L 8.0 L  --   --   --  8.6 L 8.0 L 8.0 L 8.4 L   Magnesium  --  2.3 2.0  --  2.0   < > 1.9 2.1 2.2 2.4 2.3  --   --   --   --     < > = values in this interval not displayed.         CARDIAC BIOMARKERS:  Recent Labs   Lab 09/14/22  0459 03/18/23  1106 03/19/23  1308 03/19/23  1840 03/20/23  1642   Troponin I 0.010 0.010 1.736 H 2.222 H 3.416 H         COAGS:  Recent Labs   Lab 01/12/22  0510 09/14/22  0641 03/18/23  1106 03/20/23  1744 03/27/23  2143   INR 0.9 1.0 1.0 1.0 1.2         LIPIDS/LFTS:  Recent Labs   Lab 09/30/22  0317 11/23/22  0733 03/18/23  1106 03/19/23  0413 03/29/23  0944   AST 16 17 16 22 40   ALT 16 14 16 15 57 H         BNP:  Recent Labs   Lab 05/14/22  0725  09/14/22  0459 09/23/22  1247 03/18/23  1106 03/21/23  1525   BNP 98 185 H 468 H 136 H 2,543 H         TSH:  Recent Labs   Lab 02/16/22  1157 08/24/22  1000 11/23/22  0733 01/24/23  1105 03/19/23  1308   TSH 1.332 2.279 4.924 H 4.861 H 2.787         Free T4:  Recent Labs   Lab 11/23/22  0733 01/24/23  1105   Free T4 0.86 0.90         Diagnostic Results:  CTA A/P 3/28/23 (images prev personally reviewed and interpreted)  1.  Redemonstration of left-sided retroperitoneal hemorrhage of relatively stable and volume and distribution compared to prior examination.  No jamil extravasation of IV contrast material appreciated on the arterial phase or definitive pooling of contrast material on the delayed phase.  There is a subtle punctate focal hyperdensity only appreciated on the delayed phase which is favored to represent artifact, although small focus of venous hemorrhage would be difficult to exclude.  Consider short-term follow-up repeat CT as clinically warranted.  2.  Extensive atherosclerotic calcification of the abdominal aorta and major branch vessels.  There is apparent occlusion of the partially visualized right SFA.  Additionally, there are suspected hemodynamically significant stenosis involving the bilateral renal arteries.  Please see above for additional details.  3.  Redemonstration of persistent bilateral renal nephrograms with wedge-shaped regions of hypoattenuation predominantly involving the upper poles which may relate to evolving renal infarcts.  4.  Nonspecific urinary bladder wall thickening.  Moderate urinary bladder distension.    PCI LAD/LCx 3/27/23 (images prev personally reviewed and interpreted)  LVEDP: 3mmHg  LVEF: 60%  Aortic arch: severe calcific atherosclerosis  Ost innominate artery 80%  Prox R SCA 90%, 53mmHg gradient across stenoses.  Prox-mid ICA patent  Distal aortoiliac bifurcation with patent stents in ЮЛИЯ bilaterally and possible severe ISR in R ЮЛИЯ.  Severe diffuse cor  Ca++  Dominance: Right  LM: MLI  LAD: prox 95% at D1, mid   Ramus: MLI  LCx: mid 90%  RCA: not injected, diffuse disease, severe dist RCA stenosis (see Dr. Benitez cath report  PCI prox LAD:  Preop ASA/Plavix, intraop heparin  Wired LAD/D1  Predil 2.5x12, 2.75x15 NC  Stent 3.0x24 Synergy BRADLEY  Post IVUS with mild underexpansion mid stent  Postdil 3.25x20 NC at 22atm  Excellent angiographic result, T3 flow, 0% residual stenosis  PCI mid LCx  Predil 2.5x12  Stent 2.75x20 Synergy BRADLEY 14 fidelia  Unavble to pass IVUS due to severe prox LCx tortuosity  Excellent angiographic result, T3 flow, 0% residual stenosis  Hemostasis:  R Radial band  RFA man comp  Impression:  NSTEMI with ongoing unstable sxs  Severe Ca++ 3V CAD, normal LVx  Severe PAD with severe innominate and prox R SCA stenoses and R ЮЛИЯ ISR.  Successful MV PCI:  Prox LAD 3.0x24 Synergy BRADLEY  Mid LCx 2.75x20 Synergy BRADLEY  RFA man comp  R rad vasband  Plan:  Cont med rx  ASA 81mg qd indefinitely  Plavix for minimum 1 year (thru 3/2024). If absolutely necessary, DAPT can be stopped in 30 day (4/27/23).  Would consider prolonged rx if pt tolerates.    Statin  Dispo planning as per ICU team  Follow up with Dr. Bhatt    Echo: 3/20/23  The left ventricle is normal in size with concentric hypertrophy and normal systolic function.  The estimated ejection fraction is 60%. Inferobasal hypokinesis  Indeterminate left ventricular diastolic function.  Normal right ventricular size with normal right ventricular systolic function.  Mild left atrial enlargement.  Mild right atrial enlargement.  Normal central venous pressure (3 mmHg).  The estimated PA systolic pressure is 13 mmHg.  The sinuses of Valsalva is mildly dilated. 4.2cm.    Cath: 3/23/23 (images personally reviewed and interpreted)  3/23/23 LHC - EDP 13, distal LM 30%, mild LAD 90% bifurcation lesion with D1, Cx mid 80%, RCA moderate diffuse disease with long 70% and some left to right collateral. All vessels heavily  calcified  Reviewed with Dr Malone - poor candidate for CABG. Will discuss staged PCI of LAD/Cx if able to tolerate DAPT without further pulmonary bleeding

## 2023-03-29 NOTE — NURSING
Repeat h/h improved since unit of PRBC given - 8.0/25.2- no signs of bleeding noted - sputum creamy tan/brown - no hematuria noted

## 2023-03-29 NOTE — PLAN OF CARE
[facility] - Intensive Care  Discharge Reassessment    Primary Care Provider: Kodi Tubbs MD    Expected Discharge Date: pending    Patient has trach and PEG;  PEG enteral feedings through [facility]; Trach supplies through [facility].    Reassessment (most recent)       Discharge Reassessment - 03/29/23 1123          Discharge Reassessment    Assessment Type Discharge Planning Reassessment     Did the patient's condition or plan change since previous assessment? No     Discharge Plan discussed with: --   MDT Rounding    Communicated NISA with patient/caregiver No     Discharge Plan A Home with family     Discharge Plan B Home Health     DME Needed Upon Discharge  none     Discharge Barriers Identified None     Why the patient remains in the hospital Requires continued medical care        Post-Acute Status    Discharge Delays None known at this time                 This patient has been screened for Case Management needs.  Based on (documentation in medical record/communication with attending physician/communication with nursing) and MDT rounding, patient's treatment in ICU is ongoing.  Case management will continue to follow and assist with discharge planning.

## 2023-03-29 NOTE — ASSESSMENT & PLAN NOTE
Had LEFT RP bleed overnight 3/27-3/28  Hypotension resolved.  NIBP with SBP 130s, probably closer to 160 given severe PAD and gradient between central pressure (approx 30mmHg) and NIBP during cath on 3/27/23.  Responded well to transfusion and norepi, weaning off.  Cont DAPT.  Monitor CBC closely.

## 2023-03-29 NOTE — PT/OT/SLP EVAL
Occupational Therapy   Evaluation    Name: Fareed Richard Jr.  MRN: [id number]  Admitting Diagnosis: Hypotension  Recent Surgery: Procedure(s) (LRB):  Angioplasty-coronary (Left)  Aortogram, Aortic Arch  AORTOGRAM, WITH SERIALOGRAPHY (N/A)  Stent, Coronary, Multi Vessel 2 Days Post-Op    Recommendations:     Discharge Recommendations: home health OT (with family assist)  Discharge Equipment Recommendations:  none  Barriers to discharge:   (will require assist with amb/self care)    Assessment:     Fareed Richard Jr. is a 73 y.o. male with a medical diagnosis of Hypotension.   Performance deficits affecting function: weakness, impaired endurance, impaired self care skills, impaired functional mobility, gait instability, impaired balance, decreased coordination, decreased upper extremity function, decreased lower extremity function, decreased safety awareness, pain, impaired cardiopulmonary response to activity.      Rehab Prognosis: Fair; patient would benefit from acute skilled OT services to address these deficits and reach maximum level of function.       Plan:     Patient to be seen 3 x/week, 5 x/week to address the above listed problems via self-care/home management, therapeutic activities, therapeutic exercises  Plan of Care Expires: 04/12/23  Plan of Care Reviewed with: patient    Subjective     Chief Complaint: neck pain  Patient/Family Comments/goals: agreeable to sit in the chair    Occupational Profile:  Living Environment: Pt lives with his spouse in a H with 5STE, HR available  Previous level of function:   Roles and Routines:  Mod I with ambulation and ADL's per patient report  Equipment Used at Home: wheelchair, hospital bed, walker, rolling, oxygen, bedside commode, cane, straight, shower chair, rollator  Assistance upon Discharge: spouse    Pain/Comfort:  Pain Rating 1:  (yes-did not rate)  Location 1: neck  Pain Addressed 1: Reposition, Distraction, Cessation of Activity, Nurse notified    Patients  cultural, spiritual, Yazidi conflicts given the current situation: no    Objective:     Communicated with: nurseAbigail prior to session.  Patient found HOB elevated with telemetry, peripheral IV, pulse ox (continuous), blood pressure cuff (trach collor) upon OT entry to room.    General Precautions: Standard, fall, respiratory  Orthopedic Precautions: N/A  Braces: N/A  Respiratory Status:  Trach Collar 10L 40%    Occupational Performance:    Bed Mobility:    Patient completed Scooting/Bridging with contact guard assistance  Patient completed Supine to Sit with contact guard assistance    Functional Mobility/Transfers:  Patient completed Sit <> Stand Transfer with minimum assistance  with  hand-held assist   Functional Mobility: The patient stepped to the chair with min assist CGA/HHA    Activities of Daily Living:  Upper Body Dressing: moderate assistance to don back gown  Lower Body Dressing: dependence to don socks in bed    Cognitive/Visual Perceptual:  Cognitive/Psychosocial Skills:     -       Oriented to: Person, Place, and Situation   -       Follows Commands/attention:Follows one-step commands and Follows two-step commands  -       Communication: able to communicate via mouthed words   -       Memory: No Deficits noted  -       Safety awareness/insight to disability: intact   -       Mood/Affect/Coping skills/emotional control: Appropriate to situation    Physical Exam:  Balance: -       fair+/fair  Postural examination/scapula alignment:    -       Rounded shoulders  -       Forward head  Skin integrity: Visible skin intact  Edema:  None noted  Upper Extremity Range of Motion:     -       Right Upper Extremity: WFL  -       Left Upper Extremity: WFL  Upper Extremity Strength:    -       Right Upper Extremity: WFL  -       Left Upper Extremity: WFL    AMPAC 6 Click ADL:  AMPAC Total Score: 16    Treatment & Education:  Participated in OT eval  Educated re: OT role and POC  Monitored vital with activity:   , /60, SPO2 83-88%, O2 increased to 15L with O2 sats 85-88% (nurse aware)    Patient left up in chair with all lines intact, call button in reach, and nurse notified    GOALS:   Multidisciplinary Problems       Occupational Therapy Goals          Problem: Occupational Therapy    Goal Priority Disciplines Outcome Interventions   Occupational Therapy Goal     OT, PT/OT Ongoing, Progressing    Description: Goals to be met by: 4/12/23     Patient will increase functional independence with ADLs by performing:    UE Dressing with Modified [place] and Supervision.  LE Dressing with Modified [place] and Supervision.  Grooming while standing at sink with Stand-by Assistance and Assistive Devices as needed.  Toileting from toilet with Modified [place], Supervision, and Assistive Devices as needed for hygiene and clothing management.   Sitting at edge of bed x30 minutes with Modified [place] and Supervision.  Rolling to Bilateral with Modified [place].   Supine to sit with Modified [place].  Step transfer with Modified [place] and Supervision  Toilet transfer to toilet with Supervision.  Upper extremity exercise program x10 reps per handout, with assistance as needed.                         History:     Past Medical History:   Diagnosis Date    Cancer     skin to Rt forearm and face    COPD (chronic obstructive pulmonary disease)     Hyperlipidemia     Hypertension     Pseudoaneurysm          Past Surgical History:   Procedure Laterality Date    ABDOMINAL SURGERY      stents placed in liver and large intestines, per patient    ANGIOGRAPHY OF AORTIC ARCH  3/27/2023    Procedure: Aortogram, Aortic Arch;  Surgeon: Tim Bhatt MD;  Location: [facility] CATH LAB;  Service: Cardiology;;    AORTOGRAPHY WITH SERIALOGRAPHY N/A 3/27/2023    Procedure: AORTOGRAM, WITH SERIALOGRAPHY;  Surgeon: Tim Bhatt MD;  Location: [facility] CATH LAB;  Service: Cardiology;  Laterality: N/A;     CAROTID STENT      COLONOSCOPY N/A 09/27/2022    Procedure: COLONOSCOPY;  Surgeon: Donnie Peterson MD;  Location: [facility] ENDO (2ND FLR);  Service: Endoscopy;  Laterality: N/A;    COLONOSCOPY N/A 09/30/2022    Procedure: COLONOSCOPY;  Surgeon: Joy Cabrera MD;  Location: [facility] ENDO (2ND FLR);  Service: Endoscopy;  Laterality: N/A;    CORONARY STENT PLACEMENT  01/2000    DISSECTION OF NECK Bilateral 01/04/2022    Procedure: DISSECTION, NECK;  Surgeon: Naeem Smalls MD;  Location: [facility] OR 2ND FLR;  Service: ENT;  Laterality: Bilateral;    ESOPHAGOGASTRODUODENOSCOPY N/A 09/27/2022    Procedure: EGD (ESOPHAGOGASTRODUODENOSCOPY);  Surgeon: Donnie Peterson MD;  Location: [facility] ENDO (2ND FLR);  Service: Endoscopy;  Laterality: N/A;    ESOPHAGOGASTRODUODENOSCOPY N/A 09/30/2022    Procedure: EGD (ESOPHAGOGASTRODUODENOSCOPY);  Surgeon: Joy Cabrera MD;  Location: [facility] ENDO (2ND FLR);  Service: Endoscopy;  Laterality: N/A;    ESOPHAGOGASTRODUODENOSCOPY W/ PEG N/A 01/04/2022    Procedure: EGD, WITH PEG TUBE INSERTION;  Surgeon: Anthony Calabrese MD;  [facility];  Service: General;  Laterality: N/A;    EYE SURGERY      Cataract bilateral    femoral stents      bilateral    FLAP PROCEDURE N/A 01/03/2022    Procedure: CREATION, FREE FLAP;  Surgeon: Naeem Smalls MD;  Location: [facility] OR [facility] FLR;  Service: ENT;  Laterality: N/A;    FLAP PROCEDURE Right 01/04/2022    Procedure: CREATION, FREE FLAP;  Surgeon: Stacey Conde MD;  Location: [facility] OR [facility] FLR;  Service: ENT;  Laterality: Right;  Ischemic start 1203  Ischemic stop 1353    GLOSSECTOMY N/A 01/04/2022    Procedure: GLOSSECTOMY;  Surgeon: Naeem Smalls MD;  [facility];  Service: ENT;  Laterality: N/A;    PERCUTANEOUS TRANSLUMINAL BALLOON ANGIOPLASTY OF CORONARY ARTERY Left 3/27/2023    Procedure: Angioplasty-coronary;  Surgeon: Tim Bhatt MD;  Location: [facility] CATH LAB;  Service: Cardiology;  Laterality: Left;  not before 9am, R  rad access, 6 Fr EBU 3.5 guide    RADICAL NECK DISSECTION Left 01/03/2022    Procedure: DISSECTION, NECK, RADICAL;  Surgeon: Naeem Smalls MD;  Location: [facility] OR [address] [place] [address];  Service: ENT;  Laterality: Left;    SKIN BIOPSY      SKIN CANCER EXCISION      STENT, CORONARY, MULTI VESSEL  3/27/2023    Procedure: Stent, Coronary, Multi Vessel;  Surgeon: Tim Bhatt MD;  Location: [facility] CATH LAB;  Service: Cardiology;;    TRACHEOTOMY N/A 01/04/2022    Procedure: TRACHEOTOMY;  Surgeon: Naeem Smalls MD;  Location: [facility] OR [address] [place] [address];  Service: ENT;  Laterality: N/A;    VASCULAR SURGERY         Time Tracking:     OT Date of Treatment: 03/29/23  OT Start Time: 1055  OT Stop Time: 1115  OT Total Time (min): 20 min    Billable Minutes:Evaluation 10 (with PT)  Self Care/Home Management 10  Total Time 20    3/29/2023

## 2023-03-29 NOTE — PLAN OF CARE
Problem: Physical Therapy  Goal: Physical Therapy Goal  Description: Goals to be met by: 23     Patient will increase functional independence with mobility by performin. Supine to sit with Modified [place]  2. Sit to stand transfer with Supervision  3. Bed to chair transfer with Supervision   4. Gait  x 10-15 feet with Contact Guard Assistance using Rolling Walker.   5. Lower extremity exercise program x10 reps per handout, with supervision    Outcome: Ongoing, Progressing   Initial PT evaluation performed today.  Pt could benefit from skilled PT services 2-3x/wk in order to maximize function prior to D/C.  Home with Family and HHPT recommended at time of D/C.  OK for OOB to chair and BSC with nursing staff.

## 2023-03-29 NOTE — PLAN OF CARE
Pt remains in the ICU. PM tube feeds held due to secretions coming out of trach. Tracheostomy collar in place 10 L 40%. Suctioned PRN. BP significantly improved. Off pressors the entire shift. No BM. Decreased UOP noted via urinal. AAOx4, a bit lethargic. Communicated that he has not rested in a while. Pt given PRN pain med. Relief obtained. Critical Hct, MD Jimenez notified. Transfused 1 unit of RBC per MD order. Skin intact. No new falls, injuries, or skin breakdown. Pt rested comfortably throughout the shift.

## 2023-03-29 NOTE — ASSESSMENT & PLAN NOTE
-BP typically in the 90s to low 100s systolic per chart review.   -Noted hypotension 3/19 which is addressed above.  -Holding home metoprolol and amlodipine   2

## 2023-03-30 LAB
BASOPHILS # BLD AUTO: 0 K/UL (ref 0–0.2)
BASOPHILS NFR BLD: 0 % (ref 0–1.9)
DIFFERENTIAL METHOD: ABNORMAL
EOSINOPHIL # BLD AUTO: 0.1 K/UL (ref 0–0.5)
EOSINOPHIL NFR BLD: 1.3 % (ref 0–8)
ERYTHROCYTE [DISTWIDTH] IN BLOOD BY AUTOMATED COUNT: 15.6 % (ref 11.5–14.5)
HCT VFR BLD AUTO: 24.7 % (ref 40–54)
HGB BLD-MCNC: 7.6 G/DL (ref 14–18)
IMM GRANULOCYTES # BLD AUTO: 0.03 K/UL (ref 0–0.04)
IMM GRANULOCYTES NFR BLD AUTO: 0.4 % (ref 0–0.5)
LYMPHOCYTES # BLD AUTO: 0.4 K/UL (ref 1–4.8)
LYMPHOCYTES NFR BLD: 5 % (ref 18–48)
MCH RBC QN AUTO: 27.9 PG (ref 27–31)
MCHC RBC AUTO-ENTMCNC: 30.8 G/DL (ref 32–36)
MCV RBC AUTO: 91 FL (ref 82–98)
MONOCYTES # BLD AUTO: 0.8 K/UL (ref 0.3–1)
MONOCYTES NFR BLD: 11 % (ref 4–15)
NEUTROPHILS # BLD AUTO: 6.3 K/UL (ref 1.8–7.7)
NEUTROPHILS NFR BLD: 82.3 % (ref 38–73)
NRBC BLD-RTO: 0 /100 WBC
PLATELET # BLD AUTO: 174 K/UL (ref 150–450)
PMV BLD AUTO: 9.5 FL (ref 9.2–12.9)
POCT GLUCOSE: 167 MG/DL (ref 70–110)
POCT GLUCOSE: 210 MG/DL (ref 70–110)
POCT GLUCOSE: 211 MG/DL (ref 70–110)
RBC # BLD AUTO: 2.72 M/UL (ref 4.6–6.2)
WBC # BLD AUTO: 7.64 K/UL (ref 3.9–12.7)

## 2023-03-30 PROCEDURE — 25000003 PHARM REV CODE 250: Performed by: INTERNAL MEDICINE

## 2023-03-30 PROCEDURE — 25000003 PHARM REV CODE 250: Performed by: STUDENT IN AN ORGANIZED HEALTH CARE EDUCATION/TRAINING PROGRAM

## 2023-03-30 PROCEDURE — A4216 STERILE WATER/SALINE, 10 ML: HCPCS | Performed by: HOSPITALIST

## 2023-03-30 PROCEDURE — 27100171 HC OXYGEN HIGH FLOW UP TO 24 HOURS

## 2023-03-30 PROCEDURE — 99900035 HC TECH TIME PER 15 MIN (STAT)

## 2023-03-30 PROCEDURE — 99291 PR CRITICAL CARE, E/M 30-74 MINUTES: ICD-10-PCS | Mod: ,,, | Performed by: INTERNAL MEDICINE

## 2023-03-30 PROCEDURE — 25000003 PHARM REV CODE 250: Performed by: REGISTERED NURSE

## 2023-03-30 PROCEDURE — 31720 CLEARANCE OF AIRWAYS: CPT

## 2023-03-30 PROCEDURE — 99291 CRITICAL CARE FIRST HOUR: CPT | Mod: ,,, | Performed by: INTERNAL MEDICINE

## 2023-03-30 PROCEDURE — 63600175 PHARM REV CODE 636 W HCPCS: Performed by: INTERNAL MEDICINE

## 2023-03-30 PROCEDURE — 25000242 PHARM REV CODE 250 ALT 637 W/ HCPCS: Performed by: HOSPITALIST

## 2023-03-30 PROCEDURE — 25000003 PHARM REV CODE 250: Performed by: HOSPITALIST

## 2023-03-30 PROCEDURE — 85025 COMPLETE CBC W/AUTO DIFF WBC: CPT | Performed by: HOSPITALIST

## 2023-03-30 PROCEDURE — 94761 N-INVAS EAR/PLS OXIMETRY MLT: CPT

## 2023-03-30 PROCEDURE — 94640 AIRWAY INHALATION TREATMENT: CPT

## 2023-03-30 PROCEDURE — 20000000 HC ICU ROOM

## 2023-03-30 RX ORDER — BISACODYL 10 MG
10 SUPPOSITORY, RECTAL RECTAL DAILY PRN
Status: DISCONTINUED | OUTPATIENT
Start: 2023-03-30 | End: 2023-04-30 | Stop reason: HOSPADM

## 2023-03-30 RX ADMIN — GLYCOPYRROLATE 1 MG: 1 TABLET ORAL at 09:03

## 2023-03-30 RX ADMIN — HYDROCODONE BITARTRATE AND ACETAMINOPHEN 1 TABLET: 5; 325 TABLET ORAL at 03:03

## 2023-03-30 RX ADMIN — HYDROCODONE BITARTRATE AND ACETAMINOPHEN 1 TABLET: 5; 325 TABLET ORAL at 06:03

## 2023-03-30 RX ADMIN — Medication 10 ML: at 06:03

## 2023-03-30 RX ADMIN — POLYETHYLENE GLYCOL 3350 17 G: 17 POWDER, FOR SOLUTION ORAL at 09:03

## 2023-03-30 RX ADMIN — ASPIRIN 81 MG CHEWABLE TABLET 81 MG: 81 TABLET CHEWABLE at 09:03

## 2023-03-30 RX ADMIN — INSULIN ASPART 2 UNITS: 100 INJECTION, SOLUTION INTRAVENOUS; SUBCUTANEOUS at 12:03

## 2023-03-30 RX ADMIN — POLYETHYLENE GLYCOL 3350 17 G: 17 POWDER, FOR SOLUTION ORAL at 08:03

## 2023-03-30 RX ADMIN — FUROSEMIDE 40 MG: 40 TABLET ORAL at 09:03

## 2023-03-30 RX ADMIN — HYDROXYZINE HYDROCHLORIDE 25 MG: 25 TABLET ORAL at 08:03

## 2023-03-30 RX ADMIN — BISACODYL 10 MG: 10 SUPPOSITORY RECTAL at 02:03

## 2023-03-30 RX ADMIN — GLYCOPYRROLATE 1 MG: 1 TABLET ORAL at 02:03

## 2023-03-30 RX ADMIN — Medication 10 ML: at 12:03

## 2023-03-30 RX ADMIN — CLOPIDOGREL BISULFATE 75 MG: 75 TABLET ORAL at 09:03

## 2023-03-30 RX ADMIN — IPRATROPIUM BROMIDE AND ALBUTEROL SULFATE 3 ML: 2.5; .5 SOLUTION RESPIRATORY (INHALATION) at 08:03

## 2023-03-30 RX ADMIN — GLYCOPYRROLATE 1 MG: 1 TABLET ORAL at 08:03

## 2023-03-30 RX ADMIN — METOPROLOL TARTRATE 25 MG: 25 TABLET, FILM COATED ORAL at 08:03

## 2023-03-30 RX ADMIN — HYDROXYZINE HYDROCHLORIDE 25 MG: 25 TABLET ORAL at 02:03

## 2023-03-30 RX ADMIN — FENTANYL CITRATE 50 MCG: 50 INJECTION INTRAMUSCULAR; INTRAVENOUS at 10:03

## 2023-03-30 RX ADMIN — GUAIFENESIN 400 MG: 200 SOLUTION ORAL at 08:03

## 2023-03-30 RX ADMIN — FERROUS SULFATE TAB 325 MG (65 MG ELEMENTAL FE) 1 EACH: 325 (65 FE) TAB at 09:03

## 2023-03-30 RX ADMIN — ATORVASTATIN CALCIUM 80 MG: 40 TABLET, FILM COATED ORAL at 09:03

## 2023-03-30 RX ADMIN — MELATONIN TAB 3 MG 9 MG: 3 TAB at 08:03

## 2023-03-30 RX ADMIN — FUROSEMIDE 40 MG: 40 TABLET ORAL at 06:03

## 2023-03-30 NOTE — SUBJECTIVE & OBJECTIVE
Interval History: No events overnight. Pt improved.    Review of Systems   Constitutional:  Negative for chills and fever.   Respiratory:  Positive for shortness of breath. Negative for cough.    Cardiovascular:  Negative for chest pain and leg swelling.   Gastrointestinal:  Negative for abdominal distention and abdominal pain.   Objective:     Vital Signs (Most Recent):  Temp: 98.1 °F (36.7 °C) (03/30/23 0700)  Pulse: 84 (03/30/23 0900)  Resp: (!) 21 (03/30/23 1000)  BP: (!) 107/58 (03/30/23 0930)  SpO2: (!) 92 % (03/30/23 0900)   Vital Signs (24h Range):  Temp:  [97.8 °F (36.6 °C)-98.8 °F (37.1 °C)] 98.1 °F (36.7 °C)  Pulse:  [70-96] 84  Resp:  [13-32] 21  SpO2:  [83 %-97 %] 92 %  BP: ()/(52-79) 107/58     Weight: 68 kg (150 lb)  Body mass index is 22.81 kg/m².    Intake/Output Summary (Last 24 hours) at 3/30/2023 1340  Last data filed at 3/30/2023 1308  Gross per 24 hour   Intake 1110 ml   Output 1245 ml   Net -135 ml      Physical Exam  Constitutional:       General: He is not in acute distress.     Appearance: He is ill-appearing. He is not toxic-appearing or diaphoretic.   Cardiovascular:      Rate and Rhythm: Normal rate and regular rhythm.      Heart sounds: No murmur heard.    No gallop.   Pulmonary:      Effort: Pulmonary effort is normal. No respiratory distress.      Breath sounds: Normal breath sounds. No wheezing.   Abdominal:      General: Bowel sounds are normal. There is no distension.      Palpations: Abdomen is soft.      Tenderness: There is no abdominal tenderness.       Significant Labs: All pertinent labs within the past 24 hours have been reviewed.    Significant Imaging: I have reviewed all pertinent imaging results/findings within the past 24 hours.

## 2023-03-30 NOTE — CARE UPDATE
Pt remains in ICU. Afebrile. AAOX4. Tracheostomy collar on 10 L 40% with sats > 90. Suctioned trach PRN. VSS. Pain meds given. Pt rested comfortably throughout the night. Voided via urinal. No new falls, injuries, or skin breakdown.

## 2023-03-30 NOTE — ASSESSMENT & PLAN NOTE
- Continue PRN hydroxyzine (consider scheduled if pt agreeable); encouraged QHS dose. Likely would benefit from initiation of SSRI, but concerns due to continued episodes of bleeding  - encourage hydroxyzine prior to planned PT as anxiety has been another barrier to participation    - pt's wife advocating for psych consult; share concern for pt's ability to participate in tele-psych visit; she is concerned for his ability to make medical decisions and current behavior not consistent with baseline (see ACP and nursing notes; discussed with Dr. Zhao)   - noted; management per hospital primary

## 2023-03-30 NOTE — SUBJECTIVE & OBJECTIVE
Review of Systems   Reason unable to perform ROS: trach.   Objective:     Vital Signs (Most Recent):  Temp: 97.8 °F (36.6 °C) (03/30/23 1100)  Pulse: 104 (03/30/23 1500)  Resp: (!) 26 (03/30/23 1500)  BP: 114/63 (03/30/23 1500)  SpO2: 98 % (03/30/23 1500)   Vital Signs (24h Range):  Temp:  [97.8 °F (36.6 °C)-98.8 °F (37.1 °C)] 97.8 °F (36.6 °C)  Pulse:  [] 104  Resp:  [13-33] 26  SpO2:  [86 %-99 %] 98 %  BP: ()/(52-77) 114/63     Weight: 68 kg (150 lb)  Body mass index is 22.81 kg/m².     SpO2: 98 %         Intake/Output Summary (Last 24 hours) at 3/30/2023 1622  Last data filed at 3/30/2023 1501  Gross per 24 hour   Intake 1135 ml   Output 1295 ml   Net -160 ml         Lines/Drains/Airways       Peripherally Inserted Central Catheter Line  Duration             PICC Triple Lumen 03/20/23 2145 right basilic 9 days              Drain  Duration                  Gastrostomy/Enterostomy 01/04/22 Percutaneous endoscopic gastrostomy (PEG)  days              Airway  Duration             Adult Surgical Airway 03/16/23 1014 Shiley Uncuffed 6.0 14 days                  Exam unchnaged vs 3/29/23  Physical Exam  Constitutional:       General: He is not in acute distress.     Appearance: He is well-developed. He is ill-appearing. He is not toxic-appearing or diaphoretic.   HENT:      Head: Normocephalic and atraumatic.   Eyes:      General: No scleral icterus.     Extraocular Movements: Extraocular movements intact.      Conjunctiva/sclera: Conjunctivae normal.      Pupils: Pupils are equal, round, and reactive to light.   Neck:      Thyroid: No thyromegaly.      Vascular: No JVD.      Trachea: No tracheal deviation.      Comments: Trach in place  Cardiovascular:      Rate and Rhythm: Normal rate and regular rhythm.      Heart sounds: S1 normal and S2 normal. Heart sounds are distant. No murmur heard.    No friction rub. No gallop.   Pulmonary:      Effort: Pulmonary effort is normal. No respiratory distress.       Breath sounds: Normal breath sounds. No stridor. No wheezing, rhonchi or rales.   Chest:      Chest wall: No tenderness.   Abdominal:      General: There is no distension.      Palpations: Abdomen is soft.   Musculoskeletal:         General: No swelling or tenderness. Normal range of motion.      Cervical back: Normal range of motion and neck supple. No rigidity.      Right lower leg: No edema.      Left lower leg: No edema.   Skin:     General: Skin is warm and dry.      Coloration: Skin is not jaundiced.   Neurological:      General: No focal deficit present.      Mental Status: He is oriented to person, place, and time.      Cranial Nerves: No cranial nerve deficit.   Psychiatric:         Mood and Affect: Mood normal.         Behavior: Behavior normal.       Current Medications:   aspirin  81 mg Oral Daily    atorvastatin  80 mg Per G Tube Daily    clopidogreL  75 mg Oral Daily    ferrous sulfate  1 tablet Per G Tube Daily    furosemide  40 mg Per G Tube BID    glycopyrrolate  1 mg Per G Tube TID    LIDOcaine  2 patch Transdermal Q24H    melatonin  9 mg Per G Tube Nightly    metoprolol tartrate  25 mg Per G Tube BID    polyethylene glycol  17 g Oral BID    sodium chloride 0.9%  10 mL Intravenous Q6H      dexmedeTOMIDine (Precedex) infusion (titrating) Stopped (03/24/23 1100)    nitroGLYCERIN      NORepinephrine bitartrate-D5W 0.2 mcg/kg/min (03/28/23 0519)    sodium chloride 0.9% 250 mL/hr at 03/28/23 0519     sodium chloride, acetaminophen, acetaminophen, acetaminophen, albuterol-ipratropium, atropine, bisacodyL, fentaNYL, guaiFENesin 100 mg/5 ml, HYDROcodone-acetaminophen, hydrOXYzine HCL, insulin aspart U-100, morphine, naloxone, nitroGLYCERIN, nitroGLYCERIN, ondansetron, ondansetron, oxyCODONE, sodium chloride 0.9%, sodium chloride 0.9%, Flushing PICC Protocol **AND** sodium chloride 0.9% **AND** sodium chloride 0.9%    Laboratory (all labs reviewed):  CBC:  Recent Labs   Lab 03/28/23  0460  03/28/23  0617 03/28/23  0932 03/29/23  0252 03/29/23  0944 03/30/23  0323   WBC 22.23 H 18.69 H  --  8.89 8.92 7.64   Hemoglobin 7.4 L 8.1 L 7.9 L 6.5 L 8.0 L 7.6 L   Hematocrit 23.5 L 24.3 L  --  19.8 LL 25.2 L 24.7 L   Platelets 309 269  --  183 170 174         CHEMISTRIES:  Recent Labs   Lab 05/10/22  1031 05/11/22  0412 05/12/22  0343 05/14/22  0724 05/17/22  1007 08/24/22  1000 03/22/23  0233 03/23/23  0332 03/24/23  0357 03/25/23  0429 03/26/23  0357 03/27/23  0355 03/28/23  0205 03/28/23  0427 03/29/23  0944   Glucose 130 H 103 110 96 162 H   < > 181 H 178 H  --   --   --  139 H 153 H 159 H 123 H   Sodium 140 139 135 L 136 133 L   < > 137 135 L  --   --   --  141 140 138 144   Potassium 4.6 5.0 4.4 4.1 5.0   < > 4.2 3.8  --   --   --  3.4 L 4.4 4.4 4.1   BUN 22 26 H 24 H 18 15   < > 24 H 33 H  --   --   --  38 H 42 H 44 H 46 H   Creatinine 0.8 0.7 0.7 0.7 0.7   < > 0.8 0.7  --   --   --  0.9 1.3 1.3 1.0   eGFR if non African American >60.0 >60.0 >60.0 >60 >60.0  --   --   --   --   --   --   --   --   --   --    eGFR  --   --   --   --   --    < > >60 >60  --   --   --  >60 58 A 58 A >60   Calcium 9.7 9.7 9.1 9.6 9.5   < > 8.1 L 8.0 L  --   --   --  8.6 L 8.0 L 8.0 L 8.4 L   Magnesium  --  2.3 2.0  --  2.0   < > 1.9 2.1 2.2 2.4 2.3  --   --   --   --     < > = values in this interval not displayed.         CARDIAC BIOMARKERS:  Recent Labs   Lab 09/14/22  0459 03/18/23  1106 03/19/23  1308 03/19/23  1840 03/20/23  1642   Troponin I 0.010 0.010 1.736 H 2.222 H 3.416 H         COAGS:  Recent Labs   Lab 01/12/22  0510 09/14/22  0641 03/18/23  1106 03/20/23  1744 03/27/23  2143   INR 0.9 1.0 1.0 1.0 1.2         LIPIDS/LFTS:  Recent Labs   Lab 09/30/22  0317 11/23/22  0733 03/18/23  1106 03/19/23  0413 03/29/23  0944   AST 16 17 16 22 40   ALT 16 14 16 15 57 H         BNP:  Recent Labs   Lab 05/14/22  0725 09/14/22  0459 09/23/22  1247 03/18/23  1106 03/21/23  1525   BNP 98 185 H 468 H 136 H 2,543 H          TSH:  Recent Labs   Lab 02/16/22  1157 08/24/22  1000 11/23/22  0733 01/24/23  1105 03/19/23  1308   TSH 1.332 2.279 4.924 H 4.861 H 2.787         Free T4:  Recent Labs   Lab 11/23/22  0733 01/24/23  1105   Free T4 0.86 0.90         Diagnostic Results:  CTA A/P 3/28/23 (images prev personally reviewed and interpreted)  1.  Redemonstration of left-sided retroperitoneal hemorrhage of relatively stable and volume and distribution compared to prior examination.  No jamil extravasation of IV contrast material appreciated on the arterial phase or definitive pooling of contrast material on the delayed phase.  There is a subtle punctate focal hyperdensity only appreciated on the delayed phase which is favored to represent artifact, although small focus of venous hemorrhage would be difficult to exclude.  Consider short-term follow-up repeat CT as clinically warranted.  2.  Extensive atherosclerotic calcification of the abdominal aorta and major branch vessels.  There is apparent occlusion of the partially visualized right SFA.  Additionally, there are suspected hemodynamically significant stenosis involving the bilateral renal arteries.  Please see above for additional details.  3.  Redemonstration of persistent bilateral renal nephrograms with wedge-shaped regions of hypoattenuation predominantly involving the upper poles which may relate to evolving renal infarcts.  4.  Nonspecific urinary bladder wall thickening.  Moderate urinary bladder distension.    PCI LAD/LCx 3/27/23 (images prev personally reviewed and interpreted)  LVEDP: 3mmHg  LVEF: 60%  Aortic arch: severe calcific atherosclerosis  Ost innominate artery 80%  Prox R SCA 90%, 53mmHg gradient across stenoses.  Prox-mid ICA patent  Distal aortoiliac bifurcation with patent stents in [place] bilaterally and possible severe ISR in R ЮЛИ[place].  Severe diffuse cor Ca++  Dominance: Right  LM: MLI  LAD: prox 95% at D1, mid   Ramus: MLI  LCx: mid 90%  RCA: not injected,  diffuse disease, severe dist RCA stenosis (see Dr. Benitez cath report  PCI prox LAD:  Preop ASA/Plavix, intraop heparin  Wired LAD/D1  Predil 2.5x12, 2.75x15 NC  Stent 3.0x24 Synergy BRADLEY  Post IVUS with mild underexpansion mid stent  Postdil 3.25x20 NC at 22atm  Excellent angiographic result, T3 flow, 0% residual stenosis  PCI mid LCx  Predil 2.5x12  Stent 2.75x20 Synergy BRADLEY 14 fidelia  Unavble to pass IVUS due to severe prox LCx tortuosity  Excellent angiographic result, T3 flow, 0% residual stenosis  Hemostasis:  R Radial band  RFA man comp  Impression:  NSTEMI with ongoing unstable sxs  Severe Ca++ 3V CAD, normal LVx  Severe PAD with severe innominate and prox R SCA stenoses and R ЮЛИЯ ISR.  Successful MV PCI:  Prox LAD 3.0x24 Synergy BRADLEY  Mid LCx 2.75x20 Synergy BRADLEY  RFA man comp  R rad vasband  Plan:  Cont med rx  ASA 81mg qd indefinitely  Plavix for minimum 1 year (thru 3/2024). If absolutely necessary, DAPT can be stopped in 30 day (4/27/23).  Would consider prolonged rx if pt tolerates.    Statin  Dispo planning as per ICU team  Follow up with Dr. Bhatt    Echo: 3/20/23  The left ventricle is normal in size with concentric hypertrophy and normal systolic function.  The estimated ejection fraction is 60%. Inferobasal hypokinesis  Indeterminate left ventricular diastolic function.  Normal right ventricular size with normal right ventricular systolic function.  Mild left atrial enlargement.  Mild right atrial enlargement.  Normal central venous pressure (3 mmHg).  The estimated PA systolic pressure is 13 mmHg.  The sinuses of Valsalva is mildly dilated. 4.2cm.    Cath: 3/23/23 (images personally reviewed and interpreted)  3/23/23 C - EDP 13, distal LM 30%, mild LAD 90% bifurcation lesion with D1, Cx mid 80%, RCA moderate diffuse disease with long 70% and some left to right collateral. All vessels heavily calcified  Reviewed with Dr Malone - poor candidate for CABG. Will discuss staged PCI of LAD/Cx if able to  tolerate DAPT without further pulmonary bleeding

## 2023-03-30 NOTE — ASSESSMENT & PLAN NOTE
-Patient with acute on chronic hypoxic respiratory failure  -At home is on 5L O2 via trach and O2 sats typically in high 80s low 90s  -Sats presently similar, but is having episodes of hemoptysis  -Acute etiology is likely due to pneumonia, mucus plugging and hemoptysis.  -Consulted pulm-critical care and input appreciated  -Discussed with ENT on call at [facility] 3/19 and OK to keep at [facility]. Dr. French evaluated.  -Continue antibiotics as above.  -Continue supplemental O2 via Trach and wean as able. Placed on mechanical ventilation on 3/21 for respiratory distress and increasing oxygen requirements.  Slowly weaning at this time.  Appears more comfortable today. Weaned off vent on 3/23. No need for ventilator at night.  - Currently on 10 liters trach collar

## 2023-03-30 NOTE — PT/OT/SLP PROGRESS
Physical Therapy      Patient Name:  Fareed Richard Jr.   MRN:  [id number]    Patient not seen today secondary to Patient unwilling to participate. Will follow-up .

## 2023-03-30 NOTE — PROGRESS NOTES
[facility] Medicine  Progress Note    Patient Name: Fareed Richard Jr.  MRN: [id number]  Patient Class: IP- Inpatient   Admission Date: 3/18/2023  Length of Stay: 11 days  Attending Physician: Kelby Sargent MD  Primary Care Provider: Kodi Tubbs MD        Subjective:     Principal Problem:Hypotension        HPI:  Fareed Richard Jr. 73 y.o. 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  Per his wife 4 days ago he began to have pink tinged sputum via his trach.  He was seen by his ENT 2 days ago with a scope exam and a trach exchange without evidence of bleeding.  This morning at 3:00 a.m. he developed bright red blood via trach which resolved without intervention.  It is since not recurred.  He takes a daily aspirin.  He receives all medications via G-tube.  His tube feeding regimen consists of 6 cans of Isosource daily with 120 cc free water boluses.  He denies fever chest pain shortness of breath nausea vomiting abdominal pain leg swelling syncope dizziness dysuria melena hematuria hematemesis.    In the ED, hemoglobin 7.6 INR 1.0 BUN 50 creatinine 0.7  troponin negative BRCA negative O positive type and screen chest x-ray without detrimental change hypotensive to 85/54.      Overview/Hospital Course:  72 yo M w squamous CA of tongue s/p glossectomy and reconstructive flap with trach, CAD, chronic hypoxic respiratory failure (on 5L at home) and with peg presented for eval of hemoptysis 2 days after trach change in clinic.  Transferred to ICU 3/19 when markedly hypotensive.  Unclear if due to hemorrhage, cardiogenic or septic shock.  Did require PRBC and TXA nebs but didn't seem like sufficient bleeding to cause shock.  Bleeding has stopped and ENT is on board and following.  CTA shows either significant mucus plugging or bibasilar pneumonia - pulmonology  favored pneumonia.  Also has urine culture growing Enterococcus.  He is on broad spectrum antibiotics. Troponin checked due to hypotension and it was 1.17.  Cards consulted and feel it's demand due to ischemia and hypotension and recommend outpatient stress. AM cortisol was low so ordered cosyntropin stim test which shows an adequate adrenal response to r/o adrenal insufficiency.  Developed severe shoulder pain and hypoxia, increasing troponin and pulmonary edema on CXR - shoulder pain likely anginal equivalent. Discussed with Cardiology, started heparin and nitro drip. Required nitro drip for chest pain. Tolerating heparin drip without evidence of further tracheostomy bleeding. Further discussions with Cardiology, and brought patient for [facility]/angiogram on 3/23 which showed distal LM 30%, mild LAD 90% bifurcation lesion with D1, Cx mid 80%, RCA moderate diffuse disease with long 70% and some left to right collateral. All vessels heavily calcified. Not a candidate for CABG but plan for staged PCI. Continues on heparin drip, asa/plavix and tolerating. Off pressor support/nitro drip. No further bleeding and Hb remains stable. Had episodes of hypoxia/respiratory distress after vomiting and was intermittently placed on ventilator -  now back to trach collar close to home O2 requirements. Plan for angiogram/PCI on 3/27 with Dr. Bhatt.      Interval History: No events overnight. Pt improved.    Review of Systems   Constitutional:  Negative for chills and fever.   Respiratory:  Positive for shortness of breath. Negative for cough.    Cardiovascular:  Negative for chest pain and leg swelling.   Gastrointestinal:  Negative for abdominal distention and abdominal pain.   Objective:     Vital Signs (Most Recent):  Temp: 98.1 °F (36.7 °C) (03/30/23 0700)  Pulse: 84 (03/30/23 0900)  Resp: (!) 21 (03/30/23 1000)  BP: (!) 107/58 (03/30/23 0930)  SpO2: (!) 92 % (03/30/23 0900)   Vital Signs (24h Range):  Temp:  [97.8 °F (36.6  °C)-98.8 °F (37.1 °C)] 98.1 °F (36.7 °C)  Pulse:  [70-96] 84  Resp:  [13-32] 21  SpO2:  [83 %-97 %] 92 %  BP: ()/(52-79) 107/58     Weight: 68 kg (150 lb)  Body mass index is 22.81 kg/m².    Intake/Output Summary (Last 24 hours) at 3/30/2023 1340  Last data filed at 3/30/2023 1308  Gross per 24 hour   Intake 1110 ml   Output 1245 ml   Net -135 ml      Physical Exam  Constitutional:       General: He is not in acute distress.     Appearance: He is ill-appearing. He is not toxic-appearing or diaphoretic.   Cardiovascular:      Rate and Rhythm: Normal rate and regular rhythm.      Heart sounds: No murmur heard.    No gallop.   Pulmonary:      Effort: Pulmonary effort is normal. No respiratory distress.      Breath sounds: Normal breath sounds. No wheezing.   Abdominal:      General: Bowel sounds are normal. There is no distension.      Palpations: Abdomen is soft.      Tenderness: There is no abdominal tenderness.       Significant Labs: All pertinent labs within the past 24 hours have been reviewed.    Significant Imaging: I have reviewed all pertinent imaging results/findings within the past 24 hours.      Assessment/Plan:      * Hypotension  Presented with hypotension and bleeding from tracheostomy/hemoptysis.  Patient does have lower blood pressure readings however this blood pressure was concerning in the 70s.  Patient did have some bleeding but did not suspect it was hemorrhagic.  Also thought to be septic due to possible UTI/pneumonia and being treated for antibiotics.  Also concerns for cardiogenic with an NSTEMI and being on nitro.  He was treated appropriately with antibiotics for his UTI/pneumonia and was also treated for NSTEMI.  Intermittently requiring pressor support with sedation after he was put on the ventilator.  Now on trach collar.  Hypotension has now resolved and he is doing well.    Pt went into hemorrhagic shock on 3/27 w/ L RP bleed. No evidence of continued bleeding on CTA. Levophed  "weaned.        Retroperitoneal hematoma  See above      NSTEMI (non-ST elevated myocardial infarction)  -Denies chest pain but on 3/19 did have unexplained hypotension with severe back/shoulder pain  -Troponin on admit normal, but on 3/19 bumped to 1.7 and then 2.2 on 3/20.  -No jamil ischemic change on EKG  -Echo showed EF 60%, inferobasal hypokinesis, indeterminate diastolic function  -Cardiology consulted and input appreciated  -3/19 - Cardiology suspects this is due to demand ischemia associated with his anemia and hypotension.  Further, he is a poor candidate for angiogram given his hemoptysis and inability to treat with blood thinning medications.  Recommended outpatient stress testing  -Holding aspirin given hemoptysis and metoprolol given hypotension  -Continue statin.  Resume BB as able. On nitro drip for chest pain. - having difficulty weaning off. He is currently cleared for DAPT as no further bleeding issues at this time. Updated Cardiology.   -Patient underwent LHC/angiogram on 2/23 - findings below.  "3/23/23 LHC - EDP 13, distal LM 30%, mild LAD 90% bifurcation lesion with D1, Cx mid 80%, RCA moderate diffuse disease with long 70% and some left to right collateral. All vessels heavily calcified     Reviewed with Dr Malone - poor candidate for CABG. Will discuss staged PCI of LAD/Cx if able to tolerate DAPT without further pulmonary bleeding"    - Currently on ASA/Plavix and low dose heparin infusion. Cardiology discussing intervention with patient and wife. Plans for LHC on Monday 3/27.    Underwent LHC w/ LCx and LAD stents        Anxiety        UTI (urinary tract infection)  -Urine culture growing Enterococcus > 100,000 cfu/ml.  Sensitive to Vanc/Ampicillin  -Continue broad spectrum antibiotics for now and tailor based off results.  - has completed antibiotics.    Hemoptysis  -Treatment as above. This has resolved.    Elevated brain natriuretic peptide (BNP) level  Hx noted      PEG (percutaneous " endoscopic gastrostomy) status  -Continue medications via PEG. 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      Acute respiratory failure with hypoxia  Patient with Hypoxic Respiratory failure which is Acute on chronic.  he is on home oxygen at 2 LPM. Supplemental oxygen was provided and noted- Oxygen Concentration (%):  [40] 40.   Signs/symptoms of respiratory failure include- tachypnea and increased work of breathing. Contributing diagnoses includes - Aspiration and Pneumonia Labs and images were reviewed. Patient Has not had a recent ABG. Will treat underlying causes and adjust management of respiratory failure as follows   - changed out trach and now placed to ventilator for positive pressure  - had secretions in trach with exchange. Appears to be more comfortable now.    Acute on chronic respiratory failure with hypoxia  -Patient with acute on chronic hypoxic respiratory failure  -At home is on 5L O2 via trach and O2 sats typically in high 80s low 90s  -Sats presently similar, but is having episodes of hemoptysis  -Acute etiology is likely due to pneumonia, mucus plugging and hemoptysis.  -Consulted pulm-critical care and input appreciated  -Discussed with ENT on call at [facility] 3/19 and OK to keep at [facility]. Dr. French evaluated.  -Continue antibiotics as above.  -Continue supplemental O2 via Trach and wean as able. Placed on mechanical ventilation on 3/21 for  respiratory distress and increasing oxygen requirements.  Slowly weaning at this time.  Appears more comfortable today. Weaned off vent on 3/23. No need for ventilator at night.  - Currently on 10 liters trach collar    Acute blood loss anemia  -Hb on admit 7.6 and this morning 8.0  -Baseline Hb 8-9.  -Presented w intermittent hemoptysis via trach x 2 days. Had another episode on morning of 3/19/2023  -Seen at ENT 2 days prior to admit (Dr. Smalls) with exam without source. Trache exchanged at that time.   -Ferritin only 84 and Tsat 18% - consistent with iron deficiency  -Platelets and PT/INR are normal.  -Treated with TXA nebulizer and bleeding has stopped  -Started FeSO4 which he will need at discharge  -Repeat cbc in AM. Hb 7.7 but stable, no further evidence of bleeding. Again 7.1 but no further bleeding.   Stable at 8.2 - 8.3  - no further episodes of bleeding and tolerating heparin/plavix    Pt developed L sided RP hematoma. He was having L-sided pain prior to [facility] suggesting this was not the culprit. Went into shock which resolved with transfusion  - close monitoring    Other hyperlipidemia  -Continue home statin    Primary hypertension  -BP typically in the 90s to low 100s systolic per chart review.   -Noted hypotension 3/19 which is addressed above.  -Holding home metoprolol and amlodipine    Coronary artery disease involving native coronary artery of native heart with unstable angina pectoris  -Denies chest pain but on 3/19 did have unexplained hypotension with severe back/shoulder pain  -Troponin on admit normal, but on 3/19 bumped to 1.7 and then 2.2 on 3/20.  -No jamil ischemic change on EKG  -Echo showed EF 60%, inferobasal hypokinesis, indeterminate diastolic function  -Cardiology consulted and input appreciated  -Cardiology suspects this is due to demand ischemia associated with his anemia and hypotension.  Further, he is a poor candidate for angiogram given his hemoptysis and inability to treat with  "blood thinning medications.  Recommended outpatient stress testing  -Holding aspirin given hemoptysis and metoprolol given hypotension  -Continue statin.  Resume BB as able. On nitro drip for chest pain. - having difficulty weaning off. He is currently cleared for DAPT as no further bleeding issues at this time. Updated Cardiology.   -Patient underwent LHC/angiogram on 2/23 - findings below.  "3/23/23 LHC - EDP 13, distal LM 30%, mild LAD 90% bifurcation lesion with D1, Cx mid 80%, RCA moderate diffuse disease with long 70% and some left to right collateral. All vessels heavily calcified     Reviewed with Dr Malone - poor candidate for CABG. Will discuss staged PCI of LAD/Cx if able to tolerate DAPT without further pulmonary bleeding"    - Currently on ASA/Plavix and low dose heparin infusion. Cardiology discussing intervention with patient and wife today. No intervention planned for today.    S/p LAD/Cx PCI      Squamous cell cancer of tongue  -Diagnosed 12/15/21 via FNA.  Underwent total glossectomy, bilateral neck dissection, bilateral cervical facial flaps and anterolateral thigh flap reconstruction of glossectomy defect 1/4/22  -Completed adjuvant chemoradiation  -Followed by oncology and ENT outpt. No longer on chemo or radiation.   -Had trach changed by ENT 2 days prior to admit and scope at that time showed no signs of bleeding.   -Treatment as above.      VTE Risk Mitigation (From admission, onward)         Ordered     IP VTE HIGH RISK PATIENT  Once         03/18/23 1620     Place sequential compression device  Until discontinued         03/18/23 1620     Place NIKITA hose  Until discontinued         03/18/23 1620                Discharge Planning   NISA:      Code Status: Full Code   Is the patient medically ready for discharge?:     Reason for patient still in hospital (select all that apply): Patient trending condition, Laboratory test, Treatment, Consult recommendations and Pending disposition  Discharge Plan " A: Home with family   Discharge Delays: None known at this time        Critical care time spent on the evaluation and treatment of severe organ dysfunction, review of pertinent labs and imaging studies, discussions with consulting providers and discussions with patient/family: 55 minutes.      Kelby Sargent MD  Department of Hospital Medicine   [facility] - Intensive Care

## 2023-03-30 NOTE — ASSESSMENT & PLAN NOTE
4/4/2023  - interval chart reviewed in detail; discussed pt during ICU MDT rounds   - called pt's wife, Bridgett, to discuss continued admission, pt's baseline prior to admission, and her goals prior to discharge as pt has begun to exhibit frustration with nursing care per bedside nurses (ex. Not allowing for cleaning post BM incontinence)  - Bridgett expressed frustration with pt's behavior towards nursing staff and overall at this time; she shared her concerns for her ability to care for pt at home given current status; prior to admit pt was able to walk to restroom and other small ADLs with use of walker, he was also continent to bowel and bladder; she needs him to be closer to pre-admit baseline before he can come home   - later met with pt and shared wife's concerns and goals that need to occur for him to be able to go home; discussed importance of participating with PT (if BP/oxygenation allows) and to allow for necessary medical and nursing care; discussed risks with care he is refusing or not actively participating in  - validated pt's frustration with long admit and the acuteness/frequency of his care; assured nurses are grouping care as much as possible to allow for rest but he is very sick and requires a lot of care   - pt continues to advocate that he wishes for full treatment and full code; explained this requires his compliance with treatment/care plans; but if at any point he wishes to stop aggressive measures/treatment and discuss measures to improve comfort and quality of life we can discuss   - emotional support provided to pt and wife during interactions; allowed time for questions/concerns   - wife plans to visit this afternoon to discuss GOC with pt; she is requesting psych consult and concerned about pt's mental decline and ability to make safe medical decisions    - updated MDT; ongoing communication

## 2023-03-30 NOTE — PROGRESS NOTES
[facility] - [place], [place] Intensive Care  Cardiology  Progress Note    Patient Name: Fareed Richard Jr.  MRN: [id number]  Admission Date: 3/18/2023  Hospital Length of Stay: 11 days  Code Status: Full Code   Attending Physician: Kelby Sargent MD   Primary Care Physician: Kodi Tubbs MD  Expected Discharge Date:   Principal Problem:Hypotension    Subjective:     Hospital Course:   3/21/23 Developed increased SOB last PM. Troponin increased to 3. EKG with LBBB associated with sinus tachycardia that resolved when HR improved. Started on heparin which he has tolerated so far without further hemoptysis. Low dose tridil     3/22/23 Awake on tach collar. Denies CP. Less SOB. HCT continues to drop on heparin. No further hemoptysis. ST depression noted on telemetry. Good UOP to lasix 40 mg IV last PM. BNP 2543    3/23/23 LHC - EDP 13, distal LM 30%, mild LAD 90% bifurcation lesion with D1, Cx mid 80%, RCA moderate diffuse disease with long 70% and some left to right collateral. All vessels heavily calcified  Reviewed with Dr Malone - poor candidate for CABG. Will discuss staged PCI of LAD/Cx if able to tolerate DAPT without further pulmonary bleeding.    3/27: PCI prox LAD and mid LCx via R CFA.    Interval Hx: pt seen in ICU, case d/w Dr. Sargent.  Looking good today.  No cp/sob.  Possible step down later today.    Tele: SR 80s (personally reviewed and interpreted)          Review of Systems   Reason unable to perform ROS: trach.   Objective:     Vital Signs (Most Recent):  Temp: 97.8 °F (36.6 °C) (03/30/23 1100)  Pulse: 104 (03/30/23 1500)  Resp: (!) 26 (03/30/23 1500)  BP: 114/63 (03/30/23 1500)  SpO2: 98 % (03/30/23 1500)   Vital Signs (24h Range):  Temp:  [97.8 °F (36.6 °C)-98.8 °F (37.1 °C)] 97.8 °F (36.6 °C)  Pulse:  [] 104  Resp:  [13-33] 26  SpO2:  [86 %-99 %] 98 %  BP: ()/(52-77) 114/63     Weight: 68 kg (150 lb)  Body mass index is 22.81 kg/m².     SpO2: 98 %         Intake/Output Summary (Last 24 hours) at  3/30/2023 1622  Last data filed at 3/30/2023 1501  Gross per 24 hour   Intake 1135 ml   Output 1295 ml   Net -160 ml         Lines/Drains/Airways       Peripherally Inserted Central Catheter Line  Duration             PICC Triple Lumen 03/20/23 2145 right basilic 9 days              Drain  Duration                  Gastrostomy/Enterostomy 01/04/22 Percutaneous endoscopic gastrostomy (PEG)  days              Airway  Duration             Adult Surgical Airway 03/16/23 1014 Shiley Uncuffed 6.0 14 days                  Exam unchnaged vs 3/29/23  Physical Exam  Constitutional:       General: He is not in acute distress.     Appearance: He is well-developed. He is ill-appearing. He is not toxic-appearing or diaphoretic.   HENT:      Head: Normocephalic and atraumatic.   Eyes:      General: No scleral icterus.     Extraocular Movements: Extraocular movements intact.      Conjunctiva/sclera: Conjunctivae normal.      Pupils: Pupils are equal, round, and reactive to light.   Neck:      Thyroid: No thyromegaly.      Vascular: No JVD.      Trachea: No tracheal deviation.      Comments: Trach in place  Cardiovascular:      Rate and Rhythm: Normal rate and regular rhythm.      Heart sounds: S1 normal and S2 normal. Heart sounds are distant. No murmur heard.    No friction rub. No gallop.   Pulmonary:      Effort: Pulmonary effort is normal. No respiratory distress.      Breath sounds: Normal breath sounds. No stridor. No wheezing, rhonchi or rales.   Chest:      Chest wall: No tenderness.   Abdominal:      General: There is no distension.      Palpations: Abdomen is soft.   Musculoskeletal:         General: No swelling or tenderness. Normal range of motion.      Cervical back: Normal range of motion and neck supple. No rigidity.      Right lower leg: No edema.      Left lower leg: No edema.   Skin:     General: Skin is warm and dry.      Coloration: Skin is not jaundiced.   Neurological:      General: No focal deficit  present.      Mental Status: He is oriented to person, place, and time.      Cranial Nerves: No cranial nerve deficit.   Psychiatric:         Mood and Affect: Mood normal.         Behavior: Behavior normal.       Current Medications:   aspirin  81 mg Oral Daily    atorvastatin  80 mg Per G Tube Daily    clopidogreL  75 mg Oral Daily    ferrous sulfate  1 tablet Per G Tube Daily    furosemide  40 mg Per G Tube BID    glycopyrrolate  1 mg Per G Tube TID    LIDOcaine  2 patch Transdermal Q24H    melatonin  9 mg Per G Tube Nightly    metoprolol tartrate  25 mg Per G Tube BID    polyethylene glycol  17 g Oral BID    sodium chloride 0.9%  10 mL Intravenous Q6H      dexmedeTOMIDine (Precedex) infusion (titrating) Stopped (03/24/23 1100)    nitroGLYCERIN      NORepinephrine bitartrate-D5W 0.2 mcg/kg/min (03/28/23 0519)    sodium chloride 0.9% 250 mL/hr at 03/28/23 0519     sodium chloride, acetaminophen, acetaminophen, acetaminophen, albuterol-ipratropium, atropine, bisacodyL, fentaNYL, guaiFENesin 100 mg/5 ml, HYDROcodone-acetaminophen, hydrOXYzine HCL, insulin aspart U-100, morphine, naloxone, nitroGLYCERIN, nitroGLYCERIN, ondansetron, ondansetron, oxyCODONE, sodium chloride 0.9%, sodium chloride 0.9%, Flushing PICC Protocol **AND** sodium chloride 0.9% **AND** sodium chloride 0.9%    Laboratory (all labs reviewed):  CBC:  Recent Labs   Lab 03/28/23  0427 03/28/23  0617 03/28/23  0932 03/29/23  0252 03/29/23  0944 03/30/23  0323   WBC 22.23 H 18.69 H  --  8.89 8.92 7.64   Hemoglobin 7.4 L 8.1 L 7.9 L 6.5 L 8.0 L 7.6 L   Hematocrit 23.5 L 24.3 L  --  19.8 LL 25.2 L 24.7 L   Platelets 309 269  --  183 170 174         CHEMISTRIES:  Recent Labs   Lab 05/10/22  1031 05/11/22  0412 05/12/22  0343 05/14/22  0724 05/17/22  1007 08/24/22  1000 03/22/23  0233 03/23/23  0332 03/24/23  0357 03/25/23  0429 03/26/23  0357 03/27/23  0355 03/28/23  0205 03/28/23  0427 03/29/23  0944   Glucose 130 H 103 110 96 162 H   < >  181 H 178 H  --   --   --  139 H 153 H 159 H 123 H   Sodium 140 139 135 L 136 133 L   < > 137 135 L  --   --   --  141 140 138 144   Potassium 4.6 5.0 4.4 4.1 5.0   < > 4.2 3.8  --   --   --  3.4 L 4.4 4.4 4.1   BUN 22 26 H 24 H 18 15   < > 24 H 33 H  --   --   --  38 H 42 H 44 H 46 H   Creatinine 0.8 0.7 0.7 0.7 0.7   < > 0.8 0.7  --   --   --  0.9 1.3 1.3 1.0   eGFR if non African American >60.0 >60.0 >60.0 >60 >60.0  --   --   --   --   --   --   --   --   --   --    eGFR  --   --   --   --   --    < > >60 >60  --   --   --  >60 58 A 58 A >60   Calcium 9.7 9.7 9.1 9.6 9.5   < > 8.1 L 8.0 L  --   --   --  8.6 L 8.0 L 8.0 L 8.4 L   Magnesium  --  2.3 2.0  --  2.0   < > 1.9 2.1 2.2 2.4 2.3  --   --   --   --     < > = values in this interval not displayed.         CARDIAC BIOMARKERS:  Recent Labs   Lab 09/14/22  0459 03/18/23  1106 03/19/23  1308 03/19/23  1840 03/20/23  1642   Troponin I 0.010 0.010 1.736 H 2.222 H 3.416 H         COAGS:  Recent Labs   Lab 01/12/22  0510 09/14/22  0641 03/18/23  1106 03/20/23  1744 03/27/23  2143   INR 0.9 1.0 1.0 1.0 1.2         LIPIDS/LFTS:  Recent Labs   Lab 09/30/22  0317 11/23/22  0733 03/18/23  1106 03/19/23  0413 03/29/23  0944   AST 16 17 16 22 40   ALT 16 14 16 15 57 H         BNP:  Recent Labs   Lab 05/14/22  0725 09/14/22  0459 09/23/22  1247 03/18/23  1106 03/21/23  1525   BNP 98 185 H 468 H 136 H 2,543 H         TSH:  Recent Labs   Lab 02/16/22  1157 08/24/22  1000 11/23/22  0733 01/24/23  1105 03/19/23  1308   TSH 1.332 2.279 4.924 H 4.861 H 2.787         Free T4:  Recent Labs   Lab 11/23/22  0733 01/24/23  1105   Free T4 0.86 0.90         Diagnostic Results:  CTA A/P 3/28/23 (images prev personally reviewed and interpreted)  1.  Redemonstration of left-sided retroperitoneal hemorrhage of relatively stable and volume and distribution compared to prior examination.  No jamil extravasation of IV contrast material appreciated on the arterial phase or definitive pooling  of contrast material on the delayed phase.  There is a subtle punctate focal hyperdensity only appreciated on the delayed phase which is favored to represent artifact, although small focus of venous hemorrhage would be difficult to exclude.  Consider short-term follow-up repeat CT as clinically warranted.  2.  Extensive atherosclerotic calcification of the abdominal aorta and major branch vessels.  There is apparent occlusion of the partially visualized right SFA.  Additionally, there are suspected hemodynamically significant stenosis involving the bilateral renal arteries.  Please see above for additional details.  3.  Redemonstration of persistent bilateral renal nephrograms with wedge-shaped regions of hypoattenuation predominantly involving the upper poles which may relate to evolving renal infarcts.  4.  Nonspecific urinary bladder wall thickening.  Moderate urinary bladder distension.    PCI LAD/LCx 3/27/23 (images prev personally reviewed and interpreted)  LVEDP: 3mmHg  LVEF: 60%  Aortic arch: severe calcific atherosclerosis  Ost innominate artery 80%  Prox R SCA 90%, 53mmHg gradient across stenoses.  Prox-mid ICA patent  Distal aortoiliac bifurcation with patent stents in ЮЛИ[place] bilaterally and possible severe ISR in R ЮЛИЯ.  Severe diffuse cor Ca++  Dominance: Right  LM: MLI  LAD: prox 95% at D1, mid   Ramus: MLI  LCx: mid 90%  RCA: not injected, diffuse disease, severe dist RCA stenosis (see Dr. Benitez cath report  PCI prox LAD:  Preop ASA/Plavix, intraop heparin  Wired LAD/D1  Predil 2.5x12, 2.75x15 NC  Stent 3.0x24 Synergy BRADLEY  Post IVUS with mild underexpansion mid stent  Postdil 3.25x20 NC at 22atm  Excellent angiographic result, T3 flow, 0% residual stenosis  PCI mid LCx  Predil 2.5x12  Stent 2.75x20 Synergy BRADLEY 14 fidelia  Unavble to pass IVUS due to severe prox LCx tortuosity  Excellent angiographic result, T3 flow, 0% residual stenosis  Hemostasis:  R Radial band  RFA man comp  Impression:  NSTEMI with  ongoing unstable sxs  Severe Ca++ 3V CAD, normal LVx  Severe PAD with severe innominate and prox R SCA stenoses and R ЮЛИЯ ISR.  Successful MV PCI:  Prox LAD 3.0x24 Synergy BRADLEY  Mid LCx 2.75x20 Synergy BRADLEY  RFA man comp  R rad vasband  Plan:  Cont med rx  ASA 81mg qd indefinitely  Plavix for minimum 1 year (thru 3/2024). If absolutely necessary, DAPT can be stopped in 30 day (4/27/23).  Would consider prolonged rx if pt tolerates.    Statin  Dispo planning as per ICU team  Follow up with Dr. Bhatt    Echo: 3/20/23   The left ventricle is normal in size with concentric hypertrophy and normal systolic function.   The estimated ejection fraction is 60%. Inferobasal hypokinesis   Indeterminate left ventricular diastolic function.   Normal right ventricular size with normal right ventricular systolic function.   Mild left atrial enlargement.   Mild right atrial enlargement.   Normal central venous pressure (3 mmHg).   The estimated PA systolic pressure is 13 mmHg.   The sinuses of Valsalva is mildly dilated. 4.2cm.    Cath: 3/23/23 (images personally reviewed and interpreted)  3/23/23 C - EDP 13, distal LM 30%, mild LAD 90% bifurcation lesion with D1, Cx mid 80%, RCA moderate diffuse disease with long 70% and some left to right collateral. All vessels heavily calcified  Reviewed with Dr Malone - poor candidate for CABG. Will discuss staged PCI of LAD/Cx if able to tolerate DAPT without further pulmonary bleeding      Assessment and Plan:     * Hypotension  Had LEFT RP bleed overnight 3/27-3/28  Hypotension resolved.  NIBP with SBP 130s, probably closer to 160 given severe PAD and gradient between central pressure (approx 30mmHg) and NIBP during cath on 3/27/23.  Responded well to transfusion and norepi, weaning off.  Cont DAPT.  Monitor CBC closely.    Squamous cell cancer of tongue  Per IM    Retroperitoneal hematoma  As per CAD section, ?predates PCI.  Nevertheless, seems to be doing well.  Transfuse for  HGB>8    NSTEMI (non-ST elevated myocardial infarction)  As per CAD section    Coronary artery disease involving native coronary artery of native heart with unstable angina pectoris  Remote Hx PCI to RCA 2000. Troponin up to 2.2. Denies CP. EKG with mild NSSTT changes. Likely demand ischemia from hypotension and anemia. Poor candidate for anticoagulation or LHC with high bleeding risk and hemoptysis. Check echo - if stable ok for out patient ischemic evaluation when able to tolerate anticoagulation  3/21/23 Troponin increased to 3. Had rate related LBBB that has since resolved. Denies CP. EF 60% on echo with inferobasal HK. Tolerating heparin so far.   3/23/23: cath with prox LAD/D1 (?culprit), LCx and RCA stenoses.  Poor surgical candidate.    3/24/23: no further CP.  Tolerating DAPT.  Cont statin.  Will resume heparin IV.  Will discuss possible high risk PCI with wife when she arrives.    3/25/23: discussed with wife yesterday.  Cath permit signed.      3/27/23: PCI Prox LAD 3.0x24 Synergy BRADLEY and Mid LCx 2.75x20 Synergy BRADLEY    NSTEMI with ongoing unstable sxs  Severe Ca++ 3V CAD, normal LVx  Severe PAD with severe innominate and prox R SCA stenoses and R ЮЛИЯ ISR.  Successful MV PCI as above  Cont med rx  ASA 81mg qd indefinitely  Plavix for minimum 1 year (thru 3/2024). If absolutely necessary, DAPT can be stopped in 30 day (4/27/23).  Would consider prolonged rx if pt tolerates.    Statin  Dispo planning as per ICU team  Follow up with Dr. Bhatt          Hemoptysis  Per pulmonary. No further bleeding noted.  Tolerating DAPT    Primary hypertension  Cont med rx as tolerated.    Other hyperlipidemia  Cont atorva 80mg  Check lipids/LFT 6 months (Sept 2023)    Acute blood loss anemia  LEFT RP bleed after LHC/PCI on 3/27/23 (with R CFA access).  As above  HGB stable    Acute on chronic respiratory failure with hypoxia  Mgmt per IM/pulm    Tracheostomy present  Per ENT/pulm    Elevated brain natriuretic peptide  (BNP) level  Does not appear grossly vol overloaded at present.        VTE Risk Mitigation (From admission, onward)         Ordered     IP VTE HIGH RISK PATIENT  Once         03/18/23 1620     Place sequential compression device  Until discontinued         03/18/23 1620     Place NIKITA hose  Until discontinued         03/18/23 1620              Pt seen in ICU, critical care time 35min.  Cardiology will sign off, pls call with questions.  Pt to follow up with me after discharge.    Tim Bhatt MD  Cardiology  [facility] - Intensive Care

## 2023-03-31 ENCOUNTER — TELEPHONE (OUTPATIENT)
Dept: OTOLARYNGOLOGY | Facility: CLINIC | Age: 73
End: 2023-03-31
Payer: MEDICARE

## 2023-03-31 LAB
ABO + RH BLD: NORMAL
ALBUMIN SERPL BCP-MCNC: 2.4 G/DL (ref 3.5–5.2)
ALP SERPL-CCNC: 74 U/L (ref 55–135)
ALT SERPL W/O P-5'-P-CCNC: 47 U/L (ref 10–44)
ANION GAP SERPL CALC-SCNC: 4 MMOL/L (ref 8–16)
AST SERPL-CCNC: 28 U/L (ref 10–40)
BASOPHILS # BLD AUTO: 0.01 K/UL (ref 0–0.2)
BASOPHILS # BLD AUTO: 0.01 K/UL (ref 0–0.2)
BASOPHILS NFR BLD: 0.1 % (ref 0–1.9)
BASOPHILS NFR BLD: 0.1 % (ref 0–1.9)
BILIRUB SERPL-MCNC: 0.5 MG/DL (ref 0.1–1)
BLD GP AB SCN CELLS X3 SERPL QL: NORMAL
BLD PROD TYP BPU: NORMAL
BLOOD UNIT EXPIRATION DATE: NORMAL
BLOOD UNIT TYPE CODE: 5100
BLOOD UNIT TYPE: NORMAL
BUN SERPL-MCNC: 47 MG/DL (ref 8–23)
CALCIUM SERPL-MCNC: 8.8 MG/DL (ref 8.7–10.5)
CHLORIDE SERPL-SCNC: 103 MMOL/L (ref 95–110)
CO2 SERPL-SCNC: 38 MMOL/L (ref 23–29)
CODING SYSTEM: NORMAL
CREAT SERPL-MCNC: 1 MG/DL (ref 0.5–1.4)
CROSSMATCH INTERPRETATION: NORMAL
DIFFERENTIAL METHOD: ABNORMAL
DIFFERENTIAL METHOD: ABNORMAL
DISPENSE STATUS: NORMAL
EOSINOPHIL # BLD AUTO: 0.1 K/UL (ref 0–0.5)
EOSINOPHIL # BLD AUTO: 0.1 K/UL (ref 0–0.5)
EOSINOPHIL NFR BLD: 0.9 % (ref 0–8)
EOSINOPHIL NFR BLD: 1.2 % (ref 0–8)
ERYTHROCYTE [DISTWIDTH] IN BLOOD BY AUTOMATED COUNT: 15.1 % (ref 11.5–14.5)
ERYTHROCYTE [DISTWIDTH] IN BLOOD BY AUTOMATED COUNT: 15.2 % (ref 11.5–14.5)
EST. GFR  (NO RACE VARIABLE): >60 ML/MIN/1.73 M^2
GLUCOSE SERPL-MCNC: 154 MG/DL (ref 70–110)
HCT VFR BLD AUTO: 21.7 % (ref 40–54)
HCT VFR BLD AUTO: 22.8 % (ref 40–54)
HGB BLD-MCNC: 6.8 G/DL (ref 14–18)
HGB BLD-MCNC: 7.1 G/DL (ref 14–18)
IMM GRANULOCYTES # BLD AUTO: 0.03 K/UL (ref 0–0.04)
IMM GRANULOCYTES # BLD AUTO: 0.04 K/UL (ref 0–0.04)
IMM GRANULOCYTES NFR BLD AUTO: 0.3 % (ref 0–0.5)
IMM GRANULOCYTES NFR BLD AUTO: 0.5 % (ref 0–0.5)
LYMPHOCYTES # BLD AUTO: 0.3 K/UL (ref 1–4.8)
LYMPHOCYTES # BLD AUTO: 0.3 K/UL (ref 1–4.8)
LYMPHOCYTES NFR BLD: 3.8 % (ref 18–48)
LYMPHOCYTES NFR BLD: 4 % (ref 18–48)
MCH RBC QN AUTO: 29.2 PG (ref 27–31)
MCH RBC QN AUTO: 29.4 PG (ref 27–31)
MCHC RBC AUTO-ENTMCNC: 31.1 G/DL (ref 32–36)
MCHC RBC AUTO-ENTMCNC: 31.3 G/DL (ref 32–36)
MCV RBC AUTO: 94 FL (ref 82–98)
MCV RBC AUTO: 94 FL (ref 82–98)
MONOCYTES # BLD AUTO: 0.7 K/UL (ref 0.3–1)
MONOCYTES # BLD AUTO: 0.8 K/UL (ref 0.3–1)
MONOCYTES NFR BLD: 8.8 % (ref 4–15)
MONOCYTES NFR BLD: 9.2 % (ref 4–15)
NEUTROPHILS # BLD AUTO: 6.5 K/UL (ref 1.8–7.7)
NEUTROPHILS # BLD AUTO: 7.7 K/UL (ref 1.8–7.7)
NEUTROPHILS NFR BLD: 85.3 % (ref 38–73)
NEUTROPHILS NFR BLD: 85.8 % (ref 38–73)
NRBC BLD-RTO: 0 /100 WBC
NRBC BLD-RTO: 0 /100 WBC
PLATELET # BLD AUTO: 182 K/UL (ref 150–450)
PLATELET # BLD AUTO: 185 K/UL (ref 150–450)
PMV BLD AUTO: 10 FL (ref 9.2–12.9)
PMV BLD AUTO: 9.4 FL (ref 9.2–12.9)
POCT GLUCOSE: 155 MG/DL (ref 70–110)
POCT GLUCOSE: 159 MG/DL (ref 70–110)
POCT GLUCOSE: 207 MG/DL (ref 70–110)
POCT GLUCOSE: 282 MG/DL (ref 70–110)
POTASSIUM SERPL-SCNC: 3.7 MMOL/L (ref 3.5–5.1)
PROT SERPL-MCNC: 5.5 G/DL (ref 6–8.4)
RBC # BLD AUTO: 2.31 M/UL (ref 4.6–6.2)
RBC # BLD AUTO: 2.43 M/UL (ref 4.6–6.2)
SODIUM SERPL-SCNC: 145 MMOL/L (ref 136–145)
SPECIMEN OUTDATE: NORMAL
TRANS ERYTHROCYTES VOL PATIENT: NORMAL ML
WBC # BLD AUTO: 7.58 K/UL (ref 3.9–12.7)
WBC # BLD AUTO: 8.96 K/UL (ref 3.9–12.7)

## 2023-03-31 PROCEDURE — 31615 TRCHEOBRNCHSC EST TRACHS INC: CPT | Mod: ,,, | Performed by: OTOLARYNGOLOGY

## 2023-03-31 PROCEDURE — A4216 STERILE WATER/SALINE, 10 ML: HCPCS | Performed by: HOSPITALIST

## 2023-03-31 PROCEDURE — 99900035 HC TECH TIME PER 15 MIN (STAT)

## 2023-03-31 PROCEDURE — 27200966 HC CLOSED SUCTION SYSTEM

## 2023-03-31 PROCEDURE — 97530 THERAPEUTIC ACTIVITIES: CPT

## 2023-03-31 PROCEDURE — 25000003 PHARM REV CODE 250: Performed by: HOSPITALIST

## 2023-03-31 PROCEDURE — 99231 SBSQ HOSP IP/OBS SF/LOW 25: CPT | Mod: 25,,, | Performed by: OTOLARYNGOLOGY

## 2023-03-31 PROCEDURE — 99231 PR SUBSEQUENT HOSPITAL CARE,LEVL I: ICD-10-PCS | Mod: 25,,, | Performed by: OTOLARYNGOLOGY

## 2023-03-31 PROCEDURE — 27000221 HC OXYGEN, UP TO 24 HOURS

## 2023-03-31 PROCEDURE — 25000003 PHARM REV CODE 250: Performed by: STUDENT IN AN ORGANIZED HEALTH CARE EDUCATION/TRAINING PROGRAM

## 2023-03-31 PROCEDURE — 36430 TRANSFUSION BLD/BLD COMPNT: CPT

## 2023-03-31 PROCEDURE — 94640 AIRWAY INHALATION TREATMENT: CPT

## 2023-03-31 PROCEDURE — 94761 N-INVAS EAR/PLS OXIMETRY MLT: CPT

## 2023-03-31 PROCEDURE — 80053 COMPREHEN METABOLIC PANEL: CPT | Performed by: STUDENT IN AN ORGANIZED HEALTH CARE EDUCATION/TRAINING PROGRAM

## 2023-03-31 PROCEDURE — 86920 COMPATIBILITY TEST SPIN: CPT | Performed by: STUDENT IN AN ORGANIZED HEALTH CARE EDUCATION/TRAINING PROGRAM

## 2023-03-31 PROCEDURE — 85025 COMPLETE CBC W/AUTO DIFF WBC: CPT | Performed by: HOSPITALIST

## 2023-03-31 PROCEDURE — 20000000 HC ICU ROOM

## 2023-03-31 PROCEDURE — 17250 CHEM CAUT OF GRANLTJ TISSUE: CPT | Mod: 51,,, | Performed by: OTOLARYNGOLOGY

## 2023-03-31 PROCEDURE — 25000003 PHARM REV CODE 250: Performed by: INTERNAL MEDICINE

## 2023-03-31 PROCEDURE — 85025 COMPLETE CBC W/AUTO DIFF WBC: CPT | Mod: 91 | Performed by: STUDENT IN AN ORGANIZED HEALTH CARE EDUCATION/TRAINING PROGRAM

## 2023-03-31 PROCEDURE — 31615 PR SCOPE THRU TRACHEOSTOMY: ICD-10-PCS | Mod: ,,, | Performed by: OTOLARYNGOLOGY

## 2023-03-31 PROCEDURE — 86900 BLOOD TYPING SEROLOGIC ABO: CPT | Performed by: STUDENT IN AN ORGANIZED HEALTH CARE EDUCATION/TRAINING PROGRAM

## 2023-03-31 PROCEDURE — 17250 PR CHEM CAUTERY GRANULATN TISSUE: ICD-10-PCS | Mod: 51,,, | Performed by: OTOLARYNGOLOGY

## 2023-03-31 PROCEDURE — P9021 RED BLOOD CELLS UNIT: HCPCS | Performed by: STUDENT IN AN ORGANIZED HEALTH CARE EDUCATION/TRAINING PROGRAM

## 2023-03-31 PROCEDURE — 97530 THERAPEUTIC ACTIVITIES: CPT | Mod: CQ

## 2023-03-31 RX ORDER — SENNOSIDES 8.6 MG/1
17.2 TABLET ORAL 2 TIMES DAILY
Status: DISCONTINUED | OUTPATIENT
Start: 2023-03-31 | End: 2023-04-01

## 2023-03-31 RX ORDER — HYDROCODONE BITARTRATE AND ACETAMINOPHEN 500; 5 MG/1; MG/1
TABLET ORAL
Status: DISCONTINUED | OUTPATIENT
Start: 2023-03-31 | End: 2023-04-03

## 2023-03-31 RX ADMIN — SODIUM CHLORIDE 1000 ML: 9 INJECTION, SOLUTION INTRAVENOUS at 11:03

## 2023-03-31 RX ADMIN — TRANEXAMIC ACID 500 MG: 100 INJECTION, SOLUTION INTRAVENOUS at 12:03

## 2023-03-31 RX ADMIN — INSULIN ASPART 2 UNITS: 100 INJECTION, SOLUTION INTRAVENOUS; SUBCUTANEOUS at 11:03

## 2023-03-31 RX ADMIN — ASPIRIN 81 MG CHEWABLE TABLET 81 MG: 81 TABLET CHEWABLE at 08:03

## 2023-03-31 RX ADMIN — ATORVASTATIN CALCIUM 80 MG: 40 TABLET, FILM COATED ORAL at 08:03

## 2023-03-31 RX ADMIN — OXYCODONE HYDROCHLORIDE 5 MG: 5 TABLET ORAL at 01:03

## 2023-03-31 RX ADMIN — POLYETHYLENE GLYCOL 3350 17 G: 17 POWDER, FOR SOLUTION ORAL at 10:03

## 2023-03-31 RX ADMIN — FERROUS SULFATE TAB 325 MG (65 MG ELEMENTAL FE) 1 EACH: 325 (65 FE) TAB at 08:03

## 2023-03-31 RX ADMIN — GLYCOPYRROLATE 1 MG: 1 TABLET ORAL at 08:03

## 2023-03-31 RX ADMIN — OXYCODONE HYDROCHLORIDE 5 MG: 5 TABLET ORAL at 08:03

## 2023-03-31 RX ADMIN — Medication 10 ML: at 05:03

## 2023-03-31 RX ADMIN — Medication 10 ML: at 12:03

## 2023-03-31 RX ADMIN — CLOPIDOGREL BISULFATE 75 MG: 75 TABLET ORAL at 08:03

## 2023-03-31 RX ADMIN — HYDROCODONE BITARTRATE AND ACETAMINOPHEN 1 TABLET: 5; 325 TABLET ORAL at 11:03

## 2023-03-31 RX ADMIN — LIDOCAINE 5% 2 PATCH: 700 PATCH TOPICAL at 01:03

## 2023-03-31 RX ADMIN — SENNOSIDES 17.2 MG: 8.6 TABLET, FILM COATED ORAL at 08:03

## 2023-03-31 RX ADMIN — TRANEXAMIC ACID 500 MG: 100 INJECTION, SOLUTION INTRAVENOUS at 03:03

## 2023-03-31 RX ADMIN — INSULIN ASPART 1 UNITS: 100 INJECTION, SOLUTION INTRAVENOUS; SUBCUTANEOUS at 08:03

## 2023-03-31 RX ADMIN — TRANEXAMIC ACID 500 MG: 100 INJECTION, SOLUTION INTRAVENOUS at 08:03

## 2023-03-31 RX ADMIN — BISACODYL 10 MG: 10 SUPPOSITORY RECTAL at 12:03

## 2023-03-31 NOTE — TELEPHONE ENCOUNTER
----- Message from Orly Song sent at 3/31/2023 12:30 PM CDT -----  Type: Patient Call Back    Who called:abby - icu nurse     What is the request in detail:consultation -bleeding from trach     Can the clinic reply by [facility]?no     Would the patient rather a call back or a response via [facility]?call     Best call back number:[phone]-oralia     Additional Information:room [id number]

## 2023-03-31 NOTE — PLAN OF CARE
Problem: Physical Therapy  Goal: Physical Therapy Goal  Description: Goals to be met by: 23     Patient will increase functional independence with mobility by performin. Supine to sit with Modified [place]  2. Sit to stand transfer with Supervision  3. Bed to chair transfer with Supervision   4. Gait  x 10-15 feet with Contact Guard Assistance using Rolling Walker.   5. Lower extremity exercise program x10 reps per handout, with supervision    Outcome: Ongoing, Progressing   BP was taken prior treatment BP 68/50 in Right side lying. PTA/OT reposition pt in supine and elevated HOB then pt started to cough up some dark red color blood from his trach collar and mouth, notified nurse Belia. Dr. Sargent and Dr. Willard came to bedside to evaluate pt.

## 2023-03-31 NOTE — PLAN OF CARE
Problem: Fall Injury Risk  Goal: Absence of Fall and Fall-Related Injury  Outcome: Ongoing, Progressing     Problem: Skin Injury Risk Increased  Goal: Skin Health and Integrity  Outcome: Ongoing, Progressing     Problem: Infection  Goal: Absence of Infection Signs and Symptoms  Outcome: Ongoing, Progressing     Problem: Coping Ineffective  Goal: Effective Coping  Outcome: Ongoing, Progressing

## 2023-03-31 NOTE — CONSULTS
No available appointment before 5/23/23 per Epic.  Message sent to staff to schedule.  Appointment information will be placed on AVS and given to patient at time of DC.

## 2023-03-31 NOTE — NURSING
Spoke with pt regarding constipation during shift, pt denies having BM on 3/28, states that he has been trying but has not had one since before admission. Gave suppository at 0021 per pt request, but 0400 has small smear but no BM, pt is requesting a more aggressive regimen. Will pass on to day shift as per pt request.

## 2023-03-31 NOTE — ASSESSMENT & PLAN NOTE
-Patient with acute on chronic hypoxic respiratory failure  -At home is on 5L O2 via trach and O2 sats typically in high 80s low 90s  -Sats presently similar, but is having episodes of hemoptysis  -Acute etiology is likely due to pneumonia, mucus plugging and hemoptysis.  -Consulted pulm-critical care and input appreciated  -[place] [facility] [place] OK to keep at [facility]. Dr. French evaluated.  -Continue antibiotics as above.  -Continue supplemental O2 via Trach and wean as able. Placed on mechanical ventilation on 3/21 for respiratory distress and increasing oxygen requirements.  Slowly weaning at this time.  Appears more comfortable today. Weaned off vent on 3/23. No need for ventilator at night.  - Currently on 10 liters trach collar

## 2023-03-31 NOTE — PROGRESS NOTES
[facility] Medicine  Progress Note    Patient Name: Fareed Richard Jr.  MRN: [id number]  Patient Class: IP- Inpatient   Admission Date: 3/18/2023  Length of Stay: 12 days  Attending Physician: Kelby Sargent MD  Primary Care Provider: Kodi Tubbs MD        Subjective:     Principal Problem:Hypotension        HPI:  Fareed Richard Jr. 73 y.o. 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  Per his wife 4 days ago he began to have pink tinged sputum via his trach.  He was seen by his ENT 2 days ago with a scope exam and a trach exchange without evidence of bleeding.  This morning at 3:00 a.m. he developed bright red blood via trach which resolved without intervention.  It is since not recurred.  He takes a daily aspirin.  He receives all medications via G-tube.  His tube feeding regimen consists of 6 cans of Isosource daily with 120 cc free water boluses.  He denies fever chest pain shortness of breath nausea vomiting abdominal pain leg swelling syncope dizziness dysuria melena hematuria hematemesis.    In the ED, hemoglobin 7.6 INR 1.0 BUN 50 creatinine 0.7  troponin negative BRCA negative O positive type and screen chest x-ray without detrimental change hypotensive to 85/54.      Overview/Hospital Course:  72 yo M w squamous CA of tongue s/p glossectomy and reconstructive flap with trach, CAD, chronic hypoxic respiratory failure (on 5L at home) and with peg presented for eval of hemoptysis 2 days after trach change in clinic.  Transferred to ICU 3/19 when markedly hypotensive.  Unclear if due to hemorrhage, cardiogenic or septic shock.  Did require PRBC and TXA nebs but didn't seem like sufficient bleeding to cause shock.  Bleeding has stopped and ENT is on board and following.  CTA shows either significant mucus plugging or bibasilar pneumonia - pulmonology  favored pneumonia.  Also has urine culture growing Enterococcus.  He is on broad spectrum antibiotics. Troponin checked due to hypotension and it was 1.17.  Cards consulted and feel it's demand due to ischemia and hypotension and recommend outpatient stress. AM cortisol was low so ordered cosyntropin stim test which shows an adequate adrenal response to r/o adrenal insufficiency.  Developed severe shoulder pain and hypoxia, increasing troponin and pulmonary edema on CXR - shoulder pain likely anginal equivalent. Discussed with Cardiology, started heparin and nitro drip. Required nitro drip for chest pain. Tolerating heparin drip without evidence of further tracheostomy bleeding. Further discussions with Cardiology, and brought patient for LHC/angiogram on 3/23 which showed distal LM 30%, mild LAD 90% bifurcation lesion with D1, Cx mid 80%, RCA moderate diffuse disease with long 70% and some left to right collateral. All vessels heavily calcified. Not a candidate for CABG but plan for staged PCI. Continues on heparin drip, asa/plavix and tolerating. Off pressor support/nitro drip. No further bleeding and Hb remains stable. Had episodes of hypoxia/respiratory distress after vomiting and was intermittently placed on ventilator -  now back to trach collar close to home O2 requirements. Plan for angiogram/PCI on 3/27 with Dr. Bhatt.    Pt went for LHC w/ PCI to LAD and Lcx. Post operatively he was hemodynamically unstable and anemic. Stat CTA showed RP hematoma near L kidney without acute bleeding. Pt transfused supportively. He improved over the next few days, however still required periodic transfusion. On 3/31 he started coughing up blood from his trach and became hypotensive/tachycardic.      Interval History: This AM pt started coughing up blood and became hypotensive.     Review of Systems   Constitutional:  Negative for chills and fever.   Respiratory:  Negative for cough and shortness of breath.     Cardiovascular:  Negative for chest pain and leg swelling.   Gastrointestinal:  Negative for abdominal distention and abdominal pain.   Objective:     Vital Signs (Most Recent):  Temp: 98.3 °F (36.8 °C) (03/31/23 1330)  Pulse: (!) 115 (03/31/23 1351)  Resp: (!) 28 (03/31/23 1351)  BP: (!) 78/54 (03/31/23 1345)  SpO2: (!) 94 % (03/31/23 1351)   Vital Signs (24h Range):  Temp:  [97.7 °F (36.5 °C)-98.6 °F (37 °C)] 98.3 °F (36.8 °C)  Pulse:  [] 115  Resp:  [15-36] 28  SpO2:  [82 %-100 %] 94 %  BP: ()/(50-74) 78/54     Weight: 68 kg (150 lb)  Body mass index is 22.81 kg/m².    Intake/Output Summary (Last 24 hours) at 3/31/2023 1413  Last data filed at 3/31/2023 1344  Gross per 24 hour   Intake 1130 ml   Output 1350 ml   Net -220 ml      Physical Exam  Constitutional:       General: He is not in acute distress.     Appearance: He is ill-appearing. He is not toxic-appearing or diaphoretic.   Cardiovascular:      Rate and Rhythm: Regular rhythm. Tachycardia present.      Heart sounds: No murmur heard.    No gallop.   Pulmonary:      Effort: Pulmonary effort is normal. No respiratory distress.      Breath sounds: No wheezing or rales.   Abdominal:      General: Bowel sounds are normal. There is no distension.      Palpations: Abdomen is soft.      Tenderness: There is no abdominal tenderness (suprapubic tenderness).       Significant Labs: All pertinent labs within the past 24 hours have been reviewed.    Significant Imaging: I have reviewed all pertinent imaging results/findings within the past 24 hours.      Assessment/Plan:      * Hypotension  Presented with hypotension and bleeding from tracheostomy/hemoptysis.  Patient does have lower blood pressure readings however this blood pressure was concerning in the 70s.  Patient did have some bleeding but did not suspect it was hemorrhagic.  Also thought to be septic due to possible UTI/pneumonia and being treated for antibiotics.  Also concerns for cardiogenic  "with an NSTEMI and being on nitro.  He was treated appropriately with antibiotics for his UTI/pneumonia and was also treated for NSTEMI.  Intermittently requiring pressor support with sedation after he was put on the ventilator.  Now on trach collar.  Hypotension has now resolved and he is doing well.    Pt went into hemorrhagic shock on 3/27 w/ L RP bleed. No evidence of continued bleeding on CTA. Levophed weaned.        Retroperitoneal hematoma  See above      NSTEMI (non-ST elevated myocardial infarction)  -Denies chest pain but on 3/19 did have unexplained hypotension with severe back/shoulder pain  -Troponin on admit normal, but on 3/19 bumped to 1.7 and then 2.2 on 3/20.  -No jamil ischemic change on EKG  -Echo showed EF 60%, inferobasal hypokinesis, indeterminate diastolic function  -Cardiology consulted and input appreciated  -3/19 - Cardiology suspects this is due to demand ischemia associated with his anemia and hypotension.  Further, he is a poor candidate for angiogram given his hemoptysis and inability to treat with blood thinning medications.  Recommended outpatient stress testing  -Holding aspirin given hemoptysis and metoprolol given hypotension  -Continue statin.  Resume BB as able. On nitro drip for chest pain. - having difficulty weaning off. He is currently cleared for DAPT as no further bleeding issues at this time. Updated Cardiology.   -Patient underwent LHC/angiogram on 2/23 - findings below.  "3/23/23 C - EDP 13, distal LM 30%, mild LAD 90% bifurcation lesion with D1, Cx mid 80%, RCA moderate diffuse disease with long 70% and some left to right collateral. All vessels heavily calcified     Reviewed with Dr Malone - poor candidate for CABG. Will discuss staged PCI of LAD/Cx if able to tolerate DAPT without further pulmonary bleeding"    - Currently on ASA/Plavix and low dose heparin infusion. Cardiology discussing intervention with patient and wife. Plans for [facility] on Monday 3/27.    Underwent " LHC w/ LCx and LAD stents        Anxiety        UTI (urinary tract infection)  -Urine culture growing Enterococcus > 100,000 cfu/ml.  Sensitive to Vanc/Ampicillin  -Continue broad spectrum antibiotics for now and tailor based off results.  - has completed antibiotics.    Hemoptysis  -See acute blood loss anemia    Elevated brain natriuretic peptide (BNP) level  Hx noted      PEG (percutaneous endoscopic gastrostomy) status  -Continue medications via PEG. 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      Acute respiratory failure with hypoxia  Patient with Hypoxic Respiratory failure which is Acute on chronic.  he is on home oxygen at 2 LPM. Supplemental oxygen was provided and noted- Oxygen Concentration (%):  [40] 40.   Signs/symptoms of respiratory failure include- tachypnea and increased work of breathing. Contributing diagnoses includes - Aspiration and Pneumonia Labs and images were reviewed. Patient Has not had a recent ABG. Will treat underlying causes and adjust management of respiratory failure as follows   - changed out trach and now placed to ventilator for positive pressure  - had secretions in trach with exchange. Appears to be more comfortable now.    Acute on chronic respiratory failure with hypoxia  -Patient with acute on chronic hypoxic respiratory failure  -At home is on 5L O2 via trach and O2 sats typically in high 80s low 90s  -Sats presently similar,  but is having episodes of hemoptysis  -Acute etiology is likely due to pneumonia, mucus plugging and hemoptysis.  -Consulted pulm-critical care and input appreciated  -Discussed with [place] [facility] [place] and OK to keep at . Dr. French evaluated.  -Continue antibiotics as above.  -Continue supplemental O2 via Trach and wean as able. Placed on mechanical ventilation on 3/21 for respiratory distress and increasing oxygen requirements.  Slowly weaning at this time.  Appears more comfortable today. Weaned off vent on 3/23. No need for ventilator at night.  - Currently on 10 liters trach collar    Acute blood loss anemia  -Hb on admit 7.6 and this morning 8.0  -Baseline Hb 8-9.  -Presented w intermittent hemoptysis via trach x 2 days. Had another episode on morning of 3/19/2023  -Seen at ENT 2 days prior to admit (Dr. Smalls) with exam without source. Trache exchanged at that time.   -Ferritin only 84 and Tsat 18% - consistent with iron deficiency  -Platelets and PT/INR are normal.  -Treated with TXA nebulizer and bleeding has stopped  -Started FeSO4 which he will need at discharge  -Repeat cbc in AM. Hb 7.7 but stable, no further evidence of bleeding. Again 7.1 but no further bleeding.   Stable at 8.2 - 8.3  - Pt developed L sided RP hematoma. He was having L-sided pain prior to [facility] suggesting this was not the culprit. Went into shock which resolved with transfusion      He had hemoptysis from trach on 3/31 w/ hypotension and tachycardia. ENT evaluated and did not see upper airway bleeding.   - transfuse to hgb 7  - may need repeat CTA       Other hyperlipidemia  -Continue home statin    Primary hypertension  -BP typically in the 90s to low 100s systolic per chart review.   -Noted hypotension 3/19 which is addressed above.  -Holding home metoprolol and amlodipine    Coronary artery disease involving native coronary artery of native heart with unstable angina pectoris  -Denies chest pain but on 3/19 did have  "unexplained hypotension with severe back/shoulder pain  -Troponin on admit normal, but on 3/19 bumped to 1.7 and then 2.2 on 3/20.  -No jamil ischemic change on EKG  -Echo showed EF 60%, inferobasal hypokinesis, indeterminate diastolic function  -Cardiology consulted and input appreciated  -Cardiology suspects this is due to demand ischemia associated with his anemia and hypotension.  Further, he is a poor candidate for angiogram given his hemoptysis and inability to treat with blood thinning medications.  Recommended outpatient stress testing  -Holding aspirin given hemoptysis and metoprolol given hypotension  -Continue statin.  Resume BB as able. On nitro drip for chest pain. - having difficulty weaning off. He is currently cleared for DAPT as no further bleeding issues at this time. Updated Cardiology.   -Patient underwent LHC/angiogram on 2/23 - findings below.  "3/23/23 C - EDP 13, distal LM 30%, mild LAD 90% bifurcation lesion with D1, Cx mid 80%, RCA moderate diffuse disease with long 70% and some left to right collateral. All vessels heavily calcified     Reviewed with Dr Malone - poor candidate for CABG. Will discuss staged PCI of LAD/Cx if able to tolerate DAPT without further pulmonary bleeding"    - Currently on ASA/Plavix and low dose heparin infusion. Cardiology discussing intervention with patient and wife today. No intervention planned for today.    S/p LAD/Cx PCI      Squamous cell cancer of tongue  -Diagnosed 12/15/21 via FNA.  Underwent total glossectomy, bilateral neck dissection, bilateral cervical facial flaps and anterolateral thigh flap reconstruction of glossectomy defect 1/4/22  -Completed adjuvant chemoradiation  -Followed by oncology and ENT outpt. No longer on chemo or radiation.   -Had trach changed by ENT 2 days prior to admit and scope at that time showed no signs of bleeding.   -Treatment as above.      VTE Risk Mitigation (From admission, onward)         Ordered     IP VTE HIGH RISK " PATIENT  Once         03/18/23 1620     Place sequential compression device  Until discontinued         03/18/23 1620     Place NIKITA hose  Until discontinued         03/18/23 1620                Discharge Planning   NISA:      Code Status: Full Code   Is the patient medically ready for discharge?:     Reason for patient still in hospital (select all that apply): Patient trending condition, Laboratory test, Treatment, Consult recommendations and Pending disposition  Discharge Plan A: Home with family   Discharge Delays: None known at this time        Critical care time spent on the evaluation and treatment of severe organ dysfunction, review of pertinent labs and imaging studies, discussions with consulting providers and discussions with patient/family: 45 minutes.      Kelby Sargent MD  Department of Hospital Medicine   [facility] [place] - Intensive Care

## 2023-03-31 NOTE — NURSING
"Blood pressure trending lower this AM, 80's to 90's sys.  While working with PT, he coughed up a moderate amount of dark red blood and he was tachycardic and BP went to 60"s syst.  Dr Sargent and Dr. Willard came to bedside to evaluate.  1L NS bolus started and will get STAT CBC and update type and screen.  "

## 2023-03-31 NOTE — NURSING
Complaint of difficulty urinating.  Bladder scan done, 350cc in bladder,  Complaining of pain to hips and lower abdomen.   Dr. Sargent notified.

## 2023-03-31 NOTE — PLAN OF CARE
"Bloody secretions from trach today accompanied by hypotension and tachycardia.  One unit PRBC's given.  Trach evaluated by ENT.  Fleets enema given with only liquid stool returned.  BP much improved after blood given.  CBC post transfusion 8.3 and 26.6.  Complaint of pain in his "butt hole", Hemmorrhoid ointment ordered.  Urinary retention today requiring in and out cath X 2.    "

## 2023-03-31 NOTE — PT/OT/SLP PROGRESS
Physical Therapy Treatment    Patient Name:  Fareed Richard Jr.   MRN:  [id number]    Recommendations:     Discharge Recommendations: home health PT (FAmily support)  Discharge Equipment Recommendations: none  Barriers to discharge:  Pt is currently in ICU with trach color 10 L of 40% Oxygen concentrated    Assessment:     Fareed Richard Jr. is a 73 y.o. male admitted with a medical diagnosis of Hypotension.  He presents with the following impairments/functional limitations: weakness, impaired endurance, impaired self care skills, impaired functional mobility, gait instability, impaired balance, decreased coordination, decreased upper extremity function, decreased lower extremity function, decreased safety awareness, impaired cardiopulmonary response to activity.    BP was taken prior treatment BP 68/50 in Right side lying. PTA/OT reposition pt in supine and elevated HOB then pt started to cough up some dark red color blood from his trach collar and mouth, notified nurse Celaya. Dr. Sargent and Dr. Willard came to bedside to evaluate pt.    Rehab Prognosis: Fair; patient would benefit from acute skilled PT services to address these deficits and reach maximum level of function.    Recent Surgery: Procedure(s) (LRB):  Angioplasty-coronary (Left)  Aortogram, Aortic Arch  AORTOGRAM, WITH SERIALOGRAPHY (N/A)  Stent, Coronary, Multi Vessel 4 Days Post-Op    Plan:     During this hospitalization, patient to be seen  (2-3x/wk) to address the identified rehab impairments via gait training, therapeutic activities, therapeutic exercises and progress toward the following goals:    Plan of Care Expires:  04/12/23    Subjective     Chief Complaint: want to have something for BM  Patient/Family Comments/goals: Pt agreed to participate.  Pain/Comfort:  Pain Rating 1: 0/10      Objective:     Communicated with nurse Celaya prior to session.  Patient found HOB elevated with telemetry, peripheral IV, pulse ox (continuous), blood pressure  cuff (trach collor) upon PT entry to room.     General Precautions: Standard, fall, respiratory  Orthopedic Precautions: N/A  Braces: N/A  Respiratory Status:  Trach Collar 10L of 40% Oxygen Concentrated    Bed Mobility:  Repositioned pt from R side lying to Supine for BP read, however, pt started to cough up some dark red color blood. Treatment was ended earlier and pt left with nurse Belia Sargent and Dr. Willard to evaluate pt.    AM-PAC 6 CLICK MOBILITY  Turning over in bed (including adjusting bedclothes, sheets and blankets)?: 3  Sitting down on and standing up from a chair with arms (e.g., wheelchair, bedside commode, etc.): 3  Moving from lying on back to sitting on the side of the bed?: 3  Moving to and from a bed to a chair (including a wheelchair)?: 3  Need to walk in hospital room?: 3  Climbing 3-5 steps with a railing?: 1  Basic Mobility Total Score: 16       Patient left HOB elevated with all lines intact, call button in reach, nurse Belia, Dr. Sargent and Dr. Willard notified and present.    GOALS:   Multidisciplinary Problems       Physical Therapy Goals          Problem: Physical Therapy    Goal Priority Disciplines Outcome Goal Variances Interventions   Physical Therapy Goal     PT, PT/OT Ongoing, Progressing     Description: Goals to be met by: 23     Patient will increase functional independence with mobility by performin. Supine to sit with Modified [place]  2. Sit to stand transfer with Supervision  3. Bed to chair transfer with Supervision   4. Gait  x 10-15 feet with Contact Guard Assistance using Rolling Walker.   5. Lower extremity exercise program x10 reps per handout, with supervision                         Time Tracking:     PT Received On: 23  PT Start Time: 1056     PT Stop Time: 1110  PT Total Time (min): 14 min     Billable Minutes: Therapeutic Activity 14 min (Co-treat with OT 14 min)    Treatment Type: Treatment  PT/PTA: PTA     Number of PTA visits since last  PT visit: 1 03/31/2023

## 2023-03-31 NOTE — NURSING
In and out cath placed, 500cc clear yellow urine obtained.  Pain in lower abdomen improved after in and out cath.

## 2023-03-31 NOTE — SUBJECTIVE & OBJECTIVE
Interval History: This AM pt started coughing up blood and became hypotensive.     Review of Systems   Constitutional:  Negative for chills and fever.   Respiratory:  Negative for cough and shortness of breath.    Cardiovascular:  Negative for chest pain and leg swelling.   Gastrointestinal:  Negative for abdominal distention and abdominal pain.   Objective:     Vital Signs (Most Recent):  Temp: 98.3 °F (36.8 °C) (03/31/23 1330)  Pulse: (!) 115 (03/31/23 1351)  Resp: (!) 28 (03/31/23 1351)  BP: (!) 78/54 (03/31/23 1345)  SpO2: (!) 94 % (03/31/23 1351)   Vital Signs (24h Range):  Temp:  [97.7 °F (36.5 °C)-98.6 °F (37 °C)] 98.3 °F (36.8 °C)  Pulse:  [] 115  Resp:  [15-36] 28  SpO2:  [82 %-100 %] 94 %  BP: ()/(50-74) 78/54     Weight: 68 kg (150 lb)  Body mass index is 22.81 kg/m².    Intake/Output Summary (Last 24 hours) at 3/31/2023 1413  Last data filed at 3/31/2023 1344  Gross per 24 hour   Intake 1130 ml   Output 1350 ml   Net -220 ml      Physical Exam  Constitutional:       General: He is not in acute distress.     Appearance: He is ill-appearing. He is not toxic-appearing or diaphoretic.   Cardiovascular:      Rate and Rhythm: Regular rhythm. Tachycardia present.      Heart sounds: No murmur heard.    No gallop.   Pulmonary:      Effort: Pulmonary effort is normal. No respiratory distress.      Breath sounds: No wheezing or rales.   Abdominal:      General: Bowel sounds are normal. There is no distension.      Palpations: Abdomen is soft.      Tenderness: There is no abdominal tenderness (suprapubic tenderness).       Significant Labs: All pertinent labs within the past 24 hours have been reviewed.    Significant Imaging: I have reviewed all pertinent imaging results/findings within the past 24 hours.

## 2023-03-31 NOTE — ASSESSMENT & PLAN NOTE
-Hb on admit 7.6 and this morning 8.0  -Baseline Hb 8-9.  -Presented w intermittent hemoptysis via trach x 2 days. Had another episode on morning of 3/19/2023  -Seen at ENT 2 days prior to admit (Dr. Smalls) with exam without source. Trache exchanged at that time.   -Ferritin only 84 and Tsat 18% - consistent with iron deficiency  -Platelets and PT/INR are normal.  -Treated with TXA nebulizer and bleeding has stopped  -Started FeSO4 which he will need at discharge  -Repeat cbc in AM. Hb 7.7 but stable, no further evidence of bleeding. Again 7.1 but no further bleeding.   Stable at 8.2 - 8.3  - Pt developed L sided RP hematoma. He was having L-sided pain prior to LHC suggesting this was not the culprit. Went into shock which resolved with transfusion      He had hemoptysis from trach on 3/31 w/ hypotension and tachycardia. ENT evaluated and did not see upper airway bleeding.   - transfuse to hgb 7  - may need repeat CTA

## 2023-04-01 LAB
BASOPHILS # BLD AUTO: 0.01 K/UL (ref 0–0.2)
BASOPHILS # BLD AUTO: 0.02 K/UL (ref 0–0.2)
BASOPHILS NFR BLD: 0.1 % (ref 0–1.9)
BASOPHILS NFR BLD: 0.2 % (ref 0–1.9)
DIFFERENTIAL METHOD: ABNORMAL
EOSINOPHIL # BLD AUTO: 0 K/UL (ref 0–0.5)
EOSINOPHIL NFR BLD: 0.1 % (ref 0–8)
EOSINOPHIL NFR BLD: 0.2 % (ref 0–8)
ERYTHROCYTE [DISTWIDTH] IN BLOOD BY AUTOMATED COUNT: 16.2 % (ref 11.5–14.5)
ERYTHROCYTE [DISTWIDTH] IN BLOOD BY AUTOMATED COUNT: 17 % (ref 11.5–14.5)
ERYTHROCYTE [DISTWIDTH] IN BLOOD BY AUTOMATED COUNT: 17.2 % (ref 11.5–14.5)
ERYTHROCYTE [DISTWIDTH] IN BLOOD BY AUTOMATED COUNT: 17.2 % (ref 11.5–14.5)
HCT VFR BLD AUTO: 22.1 % (ref 40–54)
HCT VFR BLD AUTO: 22.6 % (ref 40–54)
HCT VFR BLD AUTO: 25.4 % (ref 40–54)
HCT VFR BLD AUTO: 26.6 % (ref 40–54)
HGB BLD-MCNC: 7 G/DL (ref 14–18)
HGB BLD-MCNC: 7 G/DL (ref 14–18)
HGB BLD-MCNC: 8 G/DL (ref 14–18)
HGB BLD-MCNC: 8.3 G/DL (ref 14–18)
IMM GRANULOCYTES # BLD AUTO: 0.04 K/UL (ref 0–0.04)
IMM GRANULOCYTES # BLD AUTO: 0.05 K/UL (ref 0–0.04)
IMM GRANULOCYTES # BLD AUTO: 0.06 K/UL (ref 0–0.04)
IMM GRANULOCYTES # BLD AUTO: 0.09 K/UL (ref 0–0.04)
IMM GRANULOCYTES NFR BLD AUTO: 0.3 % (ref 0–0.5)
IMM GRANULOCYTES NFR BLD AUTO: 0.4 % (ref 0–0.5)
IMM GRANULOCYTES NFR BLD AUTO: 0.5 % (ref 0–0.5)
IMM GRANULOCYTES NFR BLD AUTO: 0.8 % (ref 0–0.5)
LYMPHOCYTES # BLD AUTO: 0.2 K/UL (ref 1–4.8)
LYMPHOCYTES # BLD AUTO: 0.3 K/UL (ref 1–4.8)
LYMPHOCYTES NFR BLD: 2 % (ref 18–48)
LYMPHOCYTES NFR BLD: 2.4 % (ref 18–48)
LYMPHOCYTES NFR BLD: 2.6 % (ref 18–48)
LYMPHOCYTES NFR BLD: 2.7 % (ref 18–48)
MCH RBC QN AUTO: 28 PG (ref 27–31)
MCH RBC QN AUTO: 28.8 PG (ref 27–31)
MCH RBC QN AUTO: 29.2 PG (ref 27–31)
MCH RBC QN AUTO: 29.3 PG (ref 27–31)
MCHC RBC AUTO-ENTMCNC: 31 G/DL (ref 32–36)
MCHC RBC AUTO-ENTMCNC: 31.2 G/DL (ref 32–36)
MCHC RBC AUTO-ENTMCNC: 31.5 G/DL (ref 32–36)
MCHC RBC AUTO-ENTMCNC: 31.7 G/DL (ref 32–36)
MCV RBC AUTO: 90 FL (ref 82–98)
MCV RBC AUTO: 92 FL (ref 82–98)
MCV RBC AUTO: 93 FL (ref 82–98)
MCV RBC AUTO: 93 FL (ref 82–98)
MONOCYTES # BLD AUTO: 0.8 K/UL (ref 0.3–1)
MONOCYTES # BLD AUTO: 0.9 K/UL (ref 0.3–1)
MONOCYTES # BLD AUTO: 0.9 K/UL (ref 0.3–1)
MONOCYTES # BLD AUTO: 1 K/UL (ref 0.3–1)
MONOCYTES NFR BLD: 6.9 % (ref 4–15)
MONOCYTES NFR BLD: 7.4 % (ref 4–15)
MONOCYTES NFR BLD: 7.9 % (ref 4–15)
MONOCYTES NFR BLD: 8.5 % (ref 4–15)
NEUTROPHILS # BLD AUTO: 10.1 K/UL (ref 1.8–7.7)
NEUTROPHILS # BLD AUTO: 10.5 K/UL (ref 1.8–7.7)
NEUTROPHILS # BLD AUTO: 10.6 K/UL (ref 1.8–7.7)
NEUTROPHILS # BLD AUTO: 11.3 K/UL (ref 1.8–7.7)
NEUTROPHILS NFR BLD: 87.8 % (ref 38–73)
NEUTROPHILS NFR BLD: 89.4 % (ref 38–73)
NEUTROPHILS NFR BLD: 89.5 % (ref 38–73)
NEUTROPHILS NFR BLD: 89.9 % (ref 38–73)
NRBC BLD-RTO: 0 /100 WBC
PLATELET # BLD AUTO: 181 K/UL (ref 150–450)
PLATELET # BLD AUTO: 183 K/UL (ref 150–450)
PLATELET # BLD AUTO: 197 K/UL (ref 150–450)
PLATELET # BLD AUTO: 210 K/UL (ref 150–450)
PMV BLD AUTO: 10.3 FL (ref 9.2–12.9)
PMV BLD AUTO: 9.7 FL (ref 9.2–12.9)
PMV BLD AUTO: 9.8 FL (ref 9.2–12.9)
PMV BLD AUTO: 9.9 FL (ref 9.2–12.9)
POCT GLUCOSE: 152 MG/DL (ref 70–110)
POCT GLUCOSE: 244 MG/DL (ref 70–110)
RBC # BLD AUTO: 2.4 M/UL (ref 4.6–6.2)
RBC # BLD AUTO: 2.43 M/UL (ref 4.6–6.2)
RBC # BLD AUTO: 2.73 M/UL (ref 4.6–6.2)
RBC # BLD AUTO: 2.96 M/UL (ref 4.6–6.2)
WBC # BLD AUTO: 11.29 K/UL (ref 3.9–12.7)
WBC # BLD AUTO: 11.81 K/UL (ref 3.9–12.7)
WBC # BLD AUTO: 11.9 K/UL (ref 3.9–12.7)
WBC # BLD AUTO: 12.63 K/UL (ref 3.9–12.7)

## 2023-04-01 PROCEDURE — 25000003 PHARM REV CODE 250: Performed by: INTERNAL MEDICINE

## 2023-04-01 PROCEDURE — 63600175 PHARM REV CODE 636 W HCPCS: Performed by: INTERNAL MEDICINE

## 2023-04-01 PROCEDURE — 25000003 PHARM REV CODE 250: Performed by: STUDENT IN AN ORGANIZED HEALTH CARE EDUCATION/TRAINING PROGRAM

## 2023-04-01 PROCEDURE — 25000003 PHARM REV CODE 250: Performed by: HOSPITALIST

## 2023-04-01 PROCEDURE — 25000003 PHARM REV CODE 250: Performed by: REGISTERED NURSE

## 2023-04-01 PROCEDURE — 27200966 HC CLOSED SUCTION SYSTEM

## 2023-04-01 PROCEDURE — 36415 COLL VENOUS BLD VENIPUNCTURE: CPT | Performed by: STUDENT IN AN ORGANIZED HEALTH CARE EDUCATION/TRAINING PROGRAM

## 2023-04-01 PROCEDURE — C9113 INJ PANTOPRAZOLE SODIUM, VIA: HCPCS | Performed by: STUDENT IN AN ORGANIZED HEALTH CARE EDUCATION/TRAINING PROGRAM

## 2023-04-01 PROCEDURE — 94761 N-INVAS EAR/PLS OXIMETRY MLT: CPT

## 2023-04-01 PROCEDURE — 94640 AIRWAY INHALATION TREATMENT: CPT

## 2023-04-01 PROCEDURE — 63600175 PHARM REV CODE 636 W HCPCS: Performed by: STUDENT IN AN ORGANIZED HEALTH CARE EDUCATION/TRAINING PROGRAM

## 2023-04-01 PROCEDURE — 20000000 HC ICU ROOM

## 2023-04-01 PROCEDURE — 27000221 HC OXYGEN, UP TO 24 HOURS

## 2023-04-01 PROCEDURE — 99900035 HC TECH TIME PER 15 MIN (STAT)

## 2023-04-01 PROCEDURE — A4216 STERILE WATER/SALINE, 10 ML: HCPCS | Performed by: INTERNAL MEDICINE

## 2023-04-01 PROCEDURE — 85025 COMPLETE CBC W/AUTO DIFF WBC: CPT | Performed by: STUDENT IN AN ORGANIZED HEALTH CARE EDUCATION/TRAINING PROGRAM

## 2023-04-01 RX ORDER — PANTOPRAZOLE SODIUM 40 MG/10ML
40 INJECTION, POWDER, LYOPHILIZED, FOR SOLUTION INTRAVENOUS DAILY
Status: DISCONTINUED | OUTPATIENT
Start: 2023-04-01 | End: 2023-04-10

## 2023-04-01 RX ORDER — PANTOPRAZOLE SODIUM 40 MG/10ML
80 INJECTION, POWDER, LYOPHILIZED, FOR SOLUTION INTRAVENOUS ONCE
Status: COMPLETED | OUTPATIENT
Start: 2023-04-01 | End: 2023-04-01

## 2023-04-01 RX ADMIN — TRANEXAMIC ACID 500 MG: 100 INJECTION, SOLUTION INTRAVENOUS at 02:04

## 2023-04-01 RX ADMIN — Medication 10 ML: at 06:04

## 2023-04-01 RX ADMIN — PANTOPRAZOLE SODIUM 80 MG: 40 INJECTION, POWDER, FOR SOLUTION INTRAVENOUS at 08:04

## 2023-04-01 RX ADMIN — MELATONIN TAB 3 MG 9 MG: 3 TAB at 09:04

## 2023-04-01 RX ADMIN — ASPIRIN 81 MG CHEWABLE TABLET 81 MG: 81 TABLET CHEWABLE at 08:04

## 2023-04-01 RX ADMIN — MORPHINE SULFATE 2 MG: 4 INJECTION INTRAVENOUS at 02:04

## 2023-04-01 RX ADMIN — GLYCOPYRROLATE 1 MG: 1 TABLET ORAL at 08:04

## 2023-04-01 RX ADMIN — ACETAMINOPHEN 650 MG: 325 TABLET ORAL at 05:04

## 2023-04-01 RX ADMIN — MORPHINE SULFATE 2 MG: 4 INJECTION INTRAVENOUS at 12:04

## 2023-04-01 RX ADMIN — TRANEXAMIC ACID 500 MG: 100 INJECTION, SOLUTION INTRAVENOUS at 09:04

## 2023-04-01 RX ADMIN — FERROUS SULFATE TAB 325 MG (65 MG ELEMENTAL FE) 1 EACH: 325 (65 FE) TAB at 08:04

## 2023-04-01 RX ADMIN — CLOPIDOGREL BISULFATE 75 MG: 75 TABLET ORAL at 08:04

## 2023-04-01 RX ADMIN — GLYCOPYRROLATE 1 MG: 1 TABLET ORAL at 02:04

## 2023-04-01 RX ADMIN — METOPROLOL TARTRATE 25 MG: 25 TABLET, FILM COATED ORAL at 08:04

## 2023-04-01 RX ADMIN — ATORVASTATIN CALCIUM 80 MG: 40 TABLET, FILM COATED ORAL at 08:04

## 2023-04-01 RX ADMIN — MORPHINE SULFATE 2 MG: 4 INJECTION INTRAVENOUS at 09:04

## 2023-04-01 RX ADMIN — GLYCOPYRROLATE 1 MG: 1 TABLET ORAL at 09:04

## 2023-04-01 RX ADMIN — OXYCODONE HYDROCHLORIDE 5 MG: 5 TABLET ORAL at 11:04

## 2023-04-01 RX ADMIN — LIDOCAINE 5% 2 PATCH: 700 PATCH TOPICAL at 02:04

## 2023-04-01 RX ADMIN — PANTOPRAZOLE SODIUM 40 MG: 40 INJECTION, POWDER, FOR SOLUTION INTRAVENOUS at 09:04

## 2023-04-01 RX ADMIN — HYDROXYZINE HYDROCHLORIDE 25 MG: 25 TABLET ORAL at 09:04

## 2023-04-01 RX ADMIN — OXYCODONE HYDROCHLORIDE 5 MG: 5 TABLET ORAL at 02:04

## 2023-04-01 RX ADMIN — TRANEXAMIC ACID 500 MG: 100 INJECTION, SOLUTION INTRAVENOUS at 08:04

## 2023-04-01 RX ADMIN — INSULIN ASPART 1 UNITS: 100 INJECTION, SOLUTION INTRAVENOUS; SUBCUTANEOUS at 09:04

## 2023-04-01 NOTE — NURSING
[facility]  ICU Shift Summary  Date: 4/1/2023      Prehospitalization: Home  Admit Date / LOS : 3/18/2023/ 13 days    Diagnosis: Hypotension    Consults:        Active: Cardio and Pulm CC       Needed: Palliative     Code Status: Full Code   Advanced Directive: Not Received    LDA:  Lines/Drains/Airways       Peripherally Inserted Central Catheter Line  Duration             PICC Triple Lumen 03/20/23 2145 right basilic 11 days              Drain  Duration                  Gastrostomy/Enterostomy 01/04/22 Percutaneous endoscopic gastrostomy (PEG)  days         Urethral Catheter 03/31/23 1905 Non-latex 16 Fr. <1 day              Airway  Duration             Adult Surgical Airway 03/16/23 1014 Shiley Uncuffed 6.0 15 days                  Central Lines/Site/Justification: PICC  Urinary Cath/Order/Justification:Critically Ill in the ICU and requiring intensive monitoring    Vasopressors/Infusions:    dexmedeTOMIDine (Precedex) infusion (titrating) Stopped (03/24/23 1100)    nitroGLYCERIN      NORepinephrine bitartrate-D5W 0.2 mcg/kg/min (03/31/23 1800)          GOALS: Volume/ Hemodynamic: MAP >                     RASS: N/A    Pain Management: po       Pain Controlled: yes     Rhythm: ST    Respiratory Device: Trach  Oxygen Concentration (%):  [40-60] 40                 Most Recent SBT/ SAT: N/A       MOVE Screen: PASS  ICU Liberation: not applicable    VTE Prophylaxis: Pharm  Mobility: Bedrest  Stress Ulcer Prophylaxis: Yes    Isolation: No active isolations    Dietary:   Current Diet Order   Procedures    Diet NPO Except for: Medication, Sips with Medication     Order Specific Question:   Except for     Answer:   Medication     Order Specific Question:   Except for     Answer:   Sips with Medication      Tolerance: yes  Advancement: @ goal    I & O (24h):    Intake/Output Summary (Last 24 hours) at 4/1/2023 0657  Last data filed at 4/1/2023 0619  Gross per 24 hour   Intake 4286.97 ml   Output  1452 ml   Net 2834.97 ml        Restraints: No    Significant Dates:  Post Op Date: N/A  Rescue Date: N/A  Imaging/ Diagnostics: N/A    Noteworthy Labs:  none    COVID Test: (--)  CBC/Anemia Labs: Coags:    Recent Labs   Lab 03/31/23  1140 04/01/23  0420   WBC 7.58 11.90   HGB 6.8* 8.0*   HCT 21.7* 25.4*    210   MCV 94 93   RDW 15.2* 17.2*    Recent Labs   Lab 03/27/23  2143 03/28/23  0427   INR 1.2  --    APTT 32.8* 26.5        Chemistries:   Recent Labs   Lab 03/26/23  0357 03/27/23  0355 03/29/23  0944 03/31/23  0407   NA  --    < > 144 145   K  --    < > 4.1 3.7   CL  --    < > 105 103   CO2  --    < > 33* 38*   BUN  --    < > 46* 47*   CREATININE  --    < > 1.0 1.0   CALCIUM  --    < > 8.4* 8.8   PROT  --   --  5.3* 5.5*   BILITOT  --   --  0.6 0.5   ALKPHOS  --   --  72 74   ALT  --   --  57* 47*   AST  --   --  40 28   MG 2.3  --   --   --     < > = values in this interval not displayed.        Cardiac Enzymes: Ejection Fractions:    No results for input(s): CPK, CPKMB, MB, TROPONINI in the last 72 hours. EF   Date Value Ref Range Status   03/20/2023 60 % Final        POCT Glucose: HbA1c:    Recent Labs   Lab 03/31/23  1128 03/31/23  1634 03/31/23  2019   POCTGLUCOSE 207* 159* 282*    Hemoglobin A1C   Date Value Ref Range Status   09/27/2022 6.3 (H) 4.0 - 5.6 % Final     Comment:     ADA Screening Guidelines:  5.7-6.4%  Consistent with prediabetes  >or=6.5%  Consistent with diabetes    High levels of fetal hemoglobin interfere with the HbA1C  assay. Heterozygous hemoglobin variants (HbS, HgC, etc)do  not significantly interfere with this assay.   However, presence of multiple variants may affect accuracy.             ICU LOS 12d 18h  Level of Care: Critical Care    Chart Check: 12 HR Done  Shift Summary/Plan for the shift: none

## 2023-04-01 NOTE — ASSESSMENT & PLAN NOTE
-Hb on admit 7.6 and this morning 8.0  -Baseline Hb 8-9.  -Presented w intermittent hemoptysis via trach x 2 days. Had another episode on morning of 3/19/2023  -Seen at ENT 2 days prior to admit (Dr. Smalls) with exam without source. Trache exchanged at that time.   -Ferritin only 84 and Tsat 18% - consistent with iron deficiency  -Platelets and PT/INR are normal.  -Treated with TXA nebulizer and bleeding has stopped  -Started FeSO4 which he will need at discharge  -Repeat cbc in AM. Hb 7.7 but stable, no further evidence of bleeding. Again 7.1 but no further bleeding.   Stable at 8.2 - 8.3  - Pt developed L sided RP hematoma. He was having L-sided pain prior to C suggesting this was not the culprit. Went into shock which resolved with transfusion      He had hemoptysis from trach on 3/31 w/ hypotension and tachycardia. ENT evaluated and did not see upper airway bleeding. Still with continued blood in trach. Nursing reporting dark stools overnight.   - inflate trach collar per ENT/pulm recs  - empiric PPI  - recheck CBC

## 2023-04-01 NOTE — PT/OT/SLP PROGRESS
"Occupational Therapy   Treatment    Name: Fareed Richard Jr.  MRN: [id number]  Admitting Diagnosis:  Hypotension  4 Days Post-Op    Recommendations:     Discharge Recommendations: home health OT (with family assist)  Discharge Equipment Recommendations:  none  Barriers to discharge:  Pt will require assist with amb/self care    Assessment:     Fareed Richard Jr. is a 73 y.o. male with a medical diagnosis of Hypotension.  Performance deficits affecting function are weakness, impaired endurance, impaired self care skills, impaired functional mobility, gait instability, impaired balance, decreased coordination, decreased upper extremity function, decreased lower extremity function, decreased safety awareness, pain, impaired cardiopulmonary response to activity.     Patient very eager to get OOB >chair this date; however, pt was functionally limited by significant hypotension, tachycardia  and vomiting blood when HOB was elevated. Nurse and MD aware and at bedside. Pt was provided w/ suction for relief and was able to recover. Pt educated on safety concerns for getting OOB to chair at this time.     Rehab Prognosis:  Good; patient would benefit from acute skilled OT services to address these deficits and reach maximum level of function.       Plan:     Patient to be seen 3 x/week, 5 x/week to address the above listed problems via self-care/home management, therapeutic activities, therapeutic exercises  Plan of Care Expires: 04/12/23  Plan of Care Reviewed with: patient    Subjective     Chief Complaint: "My butt hurts."   Patient/Family Comments/goals: Want to get to the chair   Pain/Comfort:   None     Objective:     Communicated with: NurseBelia, prior to session.  Patient found right sidelying, getting assistance from nurse for inez-care, with telemetry, peripheral IV, pulse ox (continuous), blood pressure cuff (trach collor)  upon OT entry to room.    General Precautions: Standard, fall, respiratory    Orthopedic " "Precautions:N/A  Braces: N/A  Respiratory Status: Trach Collar 10L of 40% Oxygen Concentrated     Occupational Performance:     Bed Mobility:  Pt w/ BP of 68/50 while in R side-lying upon entry; pt was scooted to HOB in which he gestured that he needed to "cough" or that something was in his mouth; upon elevating HOB, blood drainage began to spill from Trach collar and shortly hereafter, he began to vomit blood. Pt was provided w/ suction in which he was able to do himself. Nurse, MD and Pulmonology at bedside. Pt recovered with HR back to normal and SPO2 >90%.   Patient completed Rolling/Turning to Left with  stand by assistance and contact guard assistance for adjusting keesha pad  Patient completed Scooting to HOB with dependent and 2 persons       Activities of Daily Living:  Upper Body Dressing: minimum assistance or doffing soiled gown to renee new one     [facility] 6 Click ADL:      Treatment & Education:  -Activities functionally limited as noted above.   -Pt educated on safety concerns in regards to transferring to chair at this time due to BP being critically low as well as the events that just took place.   -Questions and concerns addressed within scope.     Patient left HOB elevated with all lines intact, call button in reach, and Nurse, MD and Pulmonologist present    GOALS:   Multidisciplinary Problems       Occupational Therapy Goals          Problem: Occupational Therapy    Goal Priority Disciplines Outcome Interventions   Occupational Therapy Goal     OT, PT/OT Ongoing, Progressing    Description: Goals to be met by: 4/12/23     Patient will increase functional independence with ADLs by performing:    UE Dressing with Modified [place] and Supervision.  LE Dressing with Modified [place] and Supervision.  Grooming while standing at sink with Stand-by Assistance and Assistive Devices as needed.  Toileting from toilet with Modified [place], Supervision, and Assistive Devices as needed for " hygiene and clothing management.   Sitting at edge of bed x30 minutes with Modified [place] and Supervision.  Rolling to Bilateral with Modified [place].   Supine to sit with Modified [place].  Step transfer with Modified [place] and Supervision  Toilet transfer to toilet with Supervision.  Upper extremity exercise program x10 reps per handout, with assistance as needed.                         Time Tracking:     OT Date of Treatment: 03/31/23  OT Start Time: 1057  OT Stop Time: 1110  OT Total Time (min): 13 min    Billable Minutes:Therapeutic Activity 13  Total Time 13               3/31/2023

## 2023-04-01 NOTE — NURSING
[facility]  Nurses Note -- 4 Eyes      3/31/2023       Skin assessed on: Q Shift      [x] No Pressure Injuries Present    [x]Prevention Measures Documented    [] Yes LDA  for Pressure Injury Previously documented     [] Yes New Pressure Injury Discovered   [] LDA for New Pressure Injury Added      Attending RN:  JLUIS BURR RN     Second RN:  MAYTE Groves RN

## 2023-04-01 NOTE — PROGRESS NOTES
[facility] Medicine  Progress Note    Patient Name: Fareed Richard Jr.  MRN: [id number]  Patient Class: IP- Inpatient   Admission Date: 3/18/2023  Length of Stay: 13 days  Attending Physician: Kelby Sargent MD  Primary Care Provider: Kodi Tubbs MD        Subjective:     Principal Problem:Acute blood loss anemia        HPI:  Fareed Richard Jr. 73 y.o. 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  Per his wife 4 days ago he began to have pink tinged sputum via his trach.  He was seen by his ENT 2 days ago with a scope exam and a trach exchange without evidence of bleeding.  This morning at 3:00 a.m. he developed bright red blood via trach which resolved without intervention.  It is since not recurred.  He takes a daily aspirin.  He receives all medications via G-tube.  His tube feeding regimen consists of 6 cans of Isosource daily with 120 cc free water boluses.  He denies fever chest pain shortness of breath nausea vomiting abdominal pain leg swelling syncope dizziness dysuria melena hematuria hematemesis.    In the ED, hemoglobin 7.6 INR 1.0 BUN 50 creatinine 0.7  troponin negative BRCA negative O positive type and screen chest x-ray without detrimental change hypotensive to 85/54.      Overview/Hospital Course:  74 yo M w squamous CA of tongue s/p glossectomy and reconstructive flap with trach, CAD, chronic hypoxic respiratory failure (on 5L at home) and with peg presented for eval of hemoptysis 2 days after trach change in clinic.  Transferred to ICU 3/19 when markedly hypotensive.  Unclear if due to hemorrhage, cardiogenic or septic shock.  Did require PRBC and TXA nebs but didn't seem like sufficient bleeding to cause shock.  Bleeding has stopped and ENT is on board and following.  CTA shows either significant mucus plugging or bibasilar pneumonia -  pulmonology favored pneumonia.  Also has urine culture growing Enterococcus.  He is on broad spectrum antibiotics. Troponin checked due to hypotension and it was 1.17.  Cards consulted and feel it's demand due to ischemia and hypotension and recommend outpatient stress. AM cortisol was low so ordered cosyntropin stim test which shows an adequate adrenal response to r/o adrenal insufficiency.  Developed severe shoulder pain and hypoxia, increasing troponin and pulmonary edema on CXR - shoulder pain likely anginal equivalent. Discussed with Cardiology, started heparin and nitro drip. Required nitro drip for chest pain. Tolerating heparin drip without evidence of further tracheostomy bleeding. Further discussions with Cardiology, and brought patient for LHC/angiogram on 3/23 which showed distal LM 30%, mild LAD 90% bifurcation lesion with D1, Cx mid 80%, RCA moderate diffuse disease with long 70% and some left to right collateral. All vessels heavily calcified. Not a candidate for CABG but plan for staged PCI. Continues on heparin drip, asa/plavix and tolerating. Off pressor support/nitro drip. No further bleeding and Hb remains stable. Had episodes of hypoxia/respiratory distress after vomiting and was intermittently placed on ventilator -  now back to trach collar close to home O2 requirements. Plan for angiogram/PCI on 3/27 with Dr. Bhatt.    Pt went for LHC w/ PCI to LAD and Lcx. Post operatively he was hemodynamically unstable and anemic. Stat CTA showed RP hematoma near L kidney without acute bleeding. Pt transfused supportively. He improved over the next few days, however still required periodic transfusion. On 3/31 he started coughing up blood from his trach and became hypotensive/tachycardic.      Interval History: Continued bleeding out of trach. Also with dark stools and blood in mouth.    Review of Systems   Constitutional:  Negative for chills and fever.   HENT:          Blood from trach   Respiratory:   Negative for cough and shortness of breath.    Cardiovascular:  Negative for chest pain and leg swelling.   Gastrointestinal:  Negative for abdominal distention and abdominal pain.   Objective:     Vital Signs (Most Recent):  Temp: 98 °F (36.7 °C) (04/01/23 0715)  Pulse: 106 (04/01/23 1200)  Resp: (!) 27 (04/01/23 1200)  BP: (!) 88/52 (04/01/23 1200)  SpO2: 98 % (04/01/23 1200)   Vital Signs (24h Range):  Temp:  [98 °F (36.7 °C)-99.1 °F (37.3 °C)] 98 °F (36.7 °C)  Pulse:  [] 106  Resp:  [18-32] 27  SpO2:  [70 %-100 %] 98 %  BP: ()/(51-79) 88/52     Weight: 68 kg (150 lb)  Body mass index is 22.81 kg/m².    Intake/Output Summary (Last 24 hours) at 4/1/2023 1226  Last data filed at 4/1/2023 0800  Gross per 24 hour   Intake 2485.29 ml   Output 1362 ml   Net 1123.29 ml      Physical Exam  Constitutional:       General: He is not in acute distress.     Appearance: He is ill-appearing and toxic-appearing. He is not diaphoretic.   Cardiovascular:      Rate and Rhythm: Regular rhythm. Tachycardia present.      Heart sounds: No murmur heard.    No gallop.   Pulmonary:      Effort: Pulmonary effort is normal. No respiratory distress.      Breath sounds: Normal breath sounds. No wheezing.   Abdominal:      General: Bowel sounds are normal. There is no distension.      Palpations: Abdomen is soft.      Tenderness: There is no abdominal tenderness.       Significant Labs: All pertinent labs within the past 24 hours have been reviewed.    Significant Imaging: I have reviewed all pertinent imaging results/findings within the past 24 hours.      Assessment/Plan:      * Acute blood loss anemia  -Hb on admit 7.6 and this morning 8.0  -Baseline Hb 8-9.  -Presented w intermittent hemoptysis via trach x 2 days. Had another episode on morning of 3/19/2023  -Seen at ENT 2 days prior to admit (Dr. Smalls) with exam without source. Trache exchanged at that time.   -Ferritin only 84 and Tsat 18% - consistent with iron  "deficiency  -Platelets and PT/INR are normal.  -Treated with TXA nebulizer and bleeding has stopped  -Started FeSO4 which he will need at discharge  -Repeat cbc in AM. Hb 7.7 but stable, no further evidence of bleeding. Again 7.1 but no further bleeding.   Stable at 8.2 - 8.3  - Pt developed L sided RP hematoma. He was having L-sided pain prior to C suggesting this was not the culprit. Went into shock which resolved with transfusion      He had hemoptysis from trach on 3/31 w/ hypotension and tachycardia. ENT evaluated and did not see upper airway bleeding. Still with continued blood in trach. Nursing reporting dark stools overnight.   - inflate trach collar per ENT/pulm recs  - empiric PPI  - recheck CBC      Retroperitoneal hematoma  See above      NSTEMI (non-ST elevated myocardial infarction)  -Denies chest pain but on 3/19 did have unexplained hypotension with severe back/shoulder pain  -Troponin on admit normal, but on 3/19 bumped to 1.7 and then 2.2 on 3/20.  -No jamil ischemic change on EKG  -Echo showed EF 60%, inferobasal hypokinesis, indeterminate diastolic function  -Cardiology consulted and input appreciated  -3/19 - Cardiology suspects this is due to demand ischemia associated with his anemia and hypotension.  Further, he is a poor candidate for angiogram given his hemoptysis and inability to treat with blood thinning medications.  Recommended outpatient stress testing  -Holding aspirin given hemoptysis and metoprolol given hypotension  -Continue statin.  Resume BB as able. On nitro drip for chest pain. - having difficulty weaning off. He is currently cleared for DAPT as no further bleeding issues at this time. Updated Cardiology.   -Patient underwent LHC/angiogram on 2/23 - findings below.  "3/23/23 C - EDP 13, distal LM 30%, mild LAD 90% bifurcation lesion with D1, Cx mid 80%, RCA moderate diffuse disease with long 70% and some left to right collateral. All vessels heavily calcified     Reviewed " "with Dr Malone - poor candidate for CABG. Will discuss staged PCI of LAD/Cx if able to tolerate DAPT without further pulmonary bleeding"    - Currently on ASA/Plavix and low dose heparin infusion. Cardiology discussing intervention with patient and wife. Plans for LHC on Monday 3/27.    Underwent LHC w/ LCx and LAD stents        Anxiety        UTI (urinary tract infection)  -Urine culture growing Enterococcus > 100,000 cfu/ml.  Sensitive to Vanc/Ampicillin  -Continue broad spectrum antibiotics for now and tailor based off results.  - has completed antibiotics.    Hemoptysis  -See acute blood loss anemia    Hypotension  Presented with hypotension and bleeding from tracheostomy/hemoptysis.  Patient does have lower blood pressure readings however this blood pressure was concerning in the 70s.  Patient did have some bleeding but did not suspect it was hemorrhagic.  Also thought to be septic due to possible UTI/pneumonia and being treated for antibiotics.  Also concerns for cardiogenic with an NSTEMI and being on nitro.  He was treated appropriately with antibiotics for his UTI/pneumonia and was also treated for NSTEMI.  Intermittently requiring pressor support with sedation after he was put on the ventilator.  Now on trach collar.  Hypotension has now resolved and he is doing well.    Pt went into hemorrhagic shock on 3/27 w/ L RP bleed. No evidence of continued bleeding on CTA. Levophed weaned.        Elevated brain natriuretic peptide (BNP) level  Hx noted      PEG (percutaneous endoscopic gastrostomy) status  -Continue medications via PEG. Does not take oral intake  -On isosource 1.5 6 days daily with 120cc free water flushes via wife  -Will continue home tube feedings, with nutrition consulted    Tracheostomy present  -Present on admit with bleeding / hemoptysis  -Treating with txa nebs and bleeding appears to have resolved  -ENT - appreciate recommendations  -Continue home glycopyrrolate and guaifensin  -Continue " supplemental oxygen    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   -TSH is normal and he is not on treatment as outpatient    ACP (advance care planning)  Appreciate palliative care involvement.      Acute respiratory failure with hypoxia  Patient with Hypoxic Respiratory failure which is Acute on chronic.  he is on home oxygen at 2 LPM. Supplemental oxygen was provided and noted- Oxygen Concentration (%):  [40] 40.   Signs/symptoms of respiratory failure include- tachypnea and increased work of breathing. Contributing diagnoses includes - Aspiration and Pneumonia Labs and images were reviewed. Patient Has not had a recent ABG. Will treat underlying causes and adjust management of respiratory failure as follows   - changed out trach and now placed to ventilator for positive pressure  - had secretions in trach with exchange. Appears to be more comfortable now.    Acute on chronic respiratory failure with hypoxia  -Patient with acute on chronic hypoxic respiratory failure  -At home is on 5L O2 via trach and O2 sats typically in high 80s low 90s  -Sats presently similar, but is having episodes of hemoptysis  -Acute etiology is likely due to pneumonia, mucus plugging and hemoptysis.  -Consulted pulm-critical care and input appreciated  -Discussed with ENT on call at [facility] 3/19 and OK to keep at [facility]. Dr. French evaluated.  -Continue antibiotics as above.  -Continue supplemental O2 via Trach and wean as able. Placed on mechanical ventilation on 3/21 for respiratory distress and increasing oxygen requirements.  Slowly weaning at this time.  Appears more comfortable today. Weaned off vent on 3/23. No need for ventilator at night.  - Currently on 10 liters trach collar    Other hyperlipidemia  -Continue home statin    Primary hypertension  -BP typically in the 90s to low 100s systolic per chart review.   -Noted hypotension 3/19 which is addressed above.  -Holding home metoprolol and  "amlodipine    Coronary artery disease involving native coronary artery of native heart with unstable angina pectoris  -Denies chest pain but on 3/19 did have unexplained hypotension with severe back/shoulder pain  -Troponin on admit normal, but on 3/19 bumped to 1.7 and then 2.2 on 3/20.  -No jamil ischemic change on EKG  -Echo showed EF 60%, inferobasal hypokinesis, indeterminate diastolic function  -Cardiology consulted and input appreciated  -Cardiology suspects this is due to demand ischemia associated with his anemia and hypotension.  Further, he is a poor candidate for angiogram given his hemoptysis and inability to treat with blood thinning medications.  Recommended outpatient stress testing  -Holding aspirin given hemoptysis and metoprolol given hypotension  -Continue statin.  Resume BB as able. On nitro drip for chest pain. - having difficulty weaning off. He is currently cleared for DAPT as no further bleeding issues at this time. Updated Cardiology.   -Patient underwent LHC/angiogram on 2/23 - findings below.  "3/23/23 LHC - EDP 13, distal LM 30%, mild LAD 90% bifurcation lesion with D1, Cx mid 80%, RCA moderate diffuse disease with long 70% and some left to right collateral. All vessels heavily calcified     Reviewed with Dr Malone - poor candidate for CABG. Will discuss staged PCI of LAD/Cx if able to tolerate DAPT without further pulmonary bleeding"    - Currently on ASA/Plavix and low dose heparin infusion. Cardiology discussing intervention with patient and wife today. No intervention planned for today.    S/p LAD/Cx PCI      Squamous cell cancer of tongue  -Diagnosed 12/15/21 via FNA.  Underwent total glossectomy, bilateral neck dissection, bilateral cervical facial flaps and anterolateral thigh flap reconstruction of glossectomy defect 1/4/22  -Completed adjuvant chemoradiation  -Followed by oncology and ENT outpt. No longer on chemo or radiation.   -Had trach changed by ENT 2 days prior to admit and " scope at that time showed no signs of bleeding.   -Treatment as above.      VTE Risk Mitigation (From admission, onward)         Ordered     IP VTE HIGH RISK PATIENT  Once         03/18/23 1620     Place sequential compression device  Until discontinued         03/18/23 1620     Place NIKITA hose  Until discontinued         03/18/23 1620                Discharge Planning   NISA:      Code Status: Full Code   Is the patient medically ready for discharge?:     Reason for patient still in hospital (select all that apply): Patient trending condition, Laboratory test, Treatment, Consult recommendations and Pending disposition  Discharge Plan A: Home with family   Discharge Delays: None known at this time        Critical care time spent on the evaluation and treatment of severe organ dysfunction, review of pertinent labs and imaging studies, discussions with consulting providers and discussions with patient/family: 55 minutes.      Kelby Sargent MD  Department of Hospital Medicine   [facility] [place] - Intensive Care

## 2023-04-01 NOTE — SUBJECTIVE & OBJECTIVE
Interval History: Continued bleeding out of trach. Also with dark stools and blood in mouth.    Review of Systems   Constitutional:  Negative for chills and fever.   HENT:          Blood from trach   Respiratory:  Negative for cough and shortness of breath.    Cardiovascular:  Negative for chest pain and leg swelling.   Gastrointestinal:  Negative for abdominal distention and abdominal pain.   Objective:     Vital Signs (Most Recent):  Temp: 98 °F (36.7 °C) (04/01/23 0715)  Pulse: 106 (04/01/23 1200)  Resp: (!) 27 (04/01/23 1200)  BP: (!) 88/52 (04/01/23 1200)  SpO2: 98 % (04/01/23 1200)   Vital Signs (24h Range):  Temp:  [98 °F (36.7 °C)-99.1 °F (37.3 °C)] 98 °F (36.7 °C)  Pulse:  [] 106  Resp:  [18-32] 27  SpO2:  [70 %-100 %] 98 %  BP: ()/(51-79) 88/52     Weight: 68 kg (150 lb)  Body mass index is 22.81 kg/m².    Intake/Output Summary (Last 24 hours) at 4/1/2023 1226  Last data filed at 4/1/2023 0800  Gross per 24 hour   Intake 2485.29 ml   Output 1362 ml   Net 1123.29 ml      Physical Exam  Constitutional:       General: He is not in acute distress.     Appearance: He is ill-appearing and toxic-appearing. He is not diaphoretic.   Cardiovascular:      Rate and Rhythm: Regular rhythm. Tachycardia present.      Heart sounds: No murmur heard.    No gallop.   Pulmonary:      Effort: Pulmonary effort is normal. No respiratory distress.      Breath sounds: Normal breath sounds. No wheezing.   Abdominal:      General: Bowel sounds are normal. There is no distension.      Palpations: Abdomen is soft.      Tenderness: There is no abdominal tenderness.       Significant Labs: All pertinent labs within the past 24 hours have been reviewed.    Significant Imaging: I have reviewed all pertinent imaging results/findings within the past 24 hours.

## 2023-04-01 NOTE — PLAN OF CARE
Trach with collar intact on 10 L. PEG TUBE in place and secure. Sinus tach. 16 Fr flores in place secure and patent. Skin intact. PICC line in place. No GTT. Bath done. Refused 2200 peg tube feedings. Multiple watery dark stools. Care explained to patient. Free from falls.

## 2023-04-01 NOTE — NURSING
Dr. Cece MD notified for dark watery stools. Collected a specimen from diaper. Per MD, notify day shift team. TM.

## 2023-04-01 NOTE — ASSESSMENT & PLAN NOTE
-Patient with acute on chronic hypoxic respiratory failure  -At home is on 5L O2 via trach and O2 sats typically in high 80s low 90s  -Sats presently similar, but is having episodes of hemoptysis  -Acute etiology is likely due to pneumonia, mucus plugging and hemoptysis.  -Consulted pulm-critical care and input appreciated  -Discussed with ENT on call at [facility] 3/19 and OK to keep at [facility]. Dr. French evaluated.  -Continue antibiotics as above.  -Continue supplemental O2 via Trach and wean as able. Placed on mechanical ventilation on 3/21 for respiratory distress and increasing oxygen requirements.  Slowly weaning at this time.  Appears more comfortable today. Weaned off vent on 3/23. No need for ventilator at night.  - Currently on 10 liters trach collar

## 2023-04-01 NOTE — PLAN OF CARE
Problem: Occupational Therapy  Goal: Occupational Therapy Goal  Description: Goals to be met by: 4/12/23     Patient will increase functional independence with ADLs by performing:    UE Dressing with Modified [place] and Supervision.  LE Dressing with Modified [place] and Supervision.  Grooming while standing at sink with Stand-by Assistance and Assistive Devices as needed.  Toileting from toilet with Modified [place], Supervision, and Assistive Devices as needed for hygiene and clothing management.   Sitting at edge of bed x30 minutes with Modified [place] and Supervision.  Rolling to Bilateral with Modified [place].   Supine to sit with Modified [place].  Step transfer with Modified [place] and Supervision  Toilet transfer to toilet with Supervision.  Upper extremity exercise program x10 reps per handout, with assistance as needed.    Outcome: Ongoing, Progressing

## 2023-04-02 LAB
ANION GAP SERPL CALC-SCNC: 10 MMOL/L (ref 8–16)
APTT PPP: 23.8 SEC (ref 21–32)
BASOPHILS # BLD AUTO: 0.01 K/UL (ref 0–0.2)
BASOPHILS # BLD AUTO: 0.02 K/UL (ref 0–0.2)
BASOPHILS NFR BLD: 0.1 % (ref 0–1.9)
BASOPHILS NFR BLD: 0.1 % (ref 0–1.9)
BLD PROD TYP BPU: NORMAL
BLOOD UNIT EXPIRATION DATE: NORMAL
BLOOD UNIT TYPE CODE: 5100
BLOOD UNIT TYPE: NORMAL
BUN SERPL-MCNC: 53 MG/DL (ref 8–23)
CALCIUM SERPL-MCNC: 9 MG/DL (ref 8.7–10.5)
CHLORIDE SERPL-SCNC: 109 MMOL/L (ref 95–110)
CO2 SERPL-SCNC: 34 MMOL/L (ref 23–29)
CODING SYSTEM: NORMAL
CREAT SERPL-MCNC: 0.9 MG/DL (ref 0.5–1.4)
CROSSMATCH INTERPRETATION: NORMAL
DIFFERENTIAL METHOD: ABNORMAL
DIFFERENTIAL METHOD: ABNORMAL
DISPENSE STATUS: NORMAL
EOSINOPHIL # BLD AUTO: 0.1 K/UL (ref 0–0.5)
EOSINOPHIL # BLD AUTO: 0.1 K/UL (ref 0–0.5)
EOSINOPHIL NFR BLD: 0.6 % (ref 0–8)
EOSINOPHIL NFR BLD: 0.8 % (ref 0–8)
ERYTHROCYTE [DISTWIDTH] IN BLOOD BY AUTOMATED COUNT: 17.2 % (ref 11.5–14.5)
ERYTHROCYTE [DISTWIDTH] IN BLOOD BY AUTOMATED COUNT: 17.8 % (ref 11.5–14.5)
EST. GFR  (NO RACE VARIABLE): >60 ML/MIN/1.73 M^2
FIBRINOGEN PPP-MCNC: 570 MG/DL (ref 182–400)
GLUCOSE SERPL-MCNC: 189 MG/DL (ref 70–110)
HCT VFR BLD AUTO: 21.4 % (ref 40–54)
HCT VFR BLD AUTO: 23.5 % (ref 40–54)
HGB BLD-MCNC: 6.7 G/DL (ref 14–18)
HGB BLD-MCNC: 6.9 G/DL (ref 14–18)
IMM GRANULOCYTES # BLD AUTO: 0.06 K/UL (ref 0–0.04)
IMM GRANULOCYTES # BLD AUTO: 0.06 K/UL (ref 0–0.04)
IMM GRANULOCYTES NFR BLD AUTO: 0.4 % (ref 0–0.5)
IMM GRANULOCYTES NFR BLD AUTO: 0.5 % (ref 0–0.5)
INR PPP: 1 (ref 0.8–1.2)
LYMPHOCYTES # BLD AUTO: 0.4 K/UL (ref 1–4.8)
LYMPHOCYTES # BLD AUTO: 0.4 K/UL (ref 1–4.8)
LYMPHOCYTES NFR BLD: 2.8 % (ref 18–48)
LYMPHOCYTES NFR BLD: 3.7 % (ref 18–48)
MCH RBC QN AUTO: 27.9 PG (ref 27–31)
MCH RBC QN AUTO: 29.9 PG (ref 27–31)
MCHC RBC AUTO-ENTMCNC: 29.4 G/DL (ref 32–36)
MCHC RBC AUTO-ENTMCNC: 31.3 G/DL (ref 32–36)
MCV RBC AUTO: 95 FL (ref 82–98)
MCV RBC AUTO: 96 FL (ref 82–98)
MONOCYTES # BLD AUTO: 0.9 K/UL (ref 0.3–1)
MONOCYTES # BLD AUTO: 1 K/UL (ref 0.3–1)
MONOCYTES NFR BLD: 7.3 % (ref 4–15)
MONOCYTES NFR BLD: 7.8 % (ref 4–15)
NEUTROPHILS # BLD AUTO: 10 K/UL (ref 1.8–7.7)
NEUTROPHILS # BLD AUTO: 12 K/UL (ref 1.8–7.7)
NEUTROPHILS NFR BLD: 87.1 % (ref 38–73)
NEUTROPHILS NFR BLD: 88.8 % (ref 38–73)
NRBC BLD-RTO: 0 /100 WBC
NRBC BLD-RTO: 0 /100 WBC
NUM UNITS TRANS PACKED RBC: NORMAL
PLATELET # BLD AUTO: 195 K/UL (ref 150–450)
PLATELET # BLD AUTO: 195 K/UL (ref 150–450)
PMV BLD AUTO: 10 FL (ref 9.2–12.9)
PMV BLD AUTO: 9.9 FL (ref 9.2–12.9)
POCT GLUCOSE: 124 MG/DL (ref 70–110)
POCT GLUCOSE: 188 MG/DL (ref 70–110)
POCT GLUCOSE: 216 MG/DL (ref 70–110)
POTASSIUM SERPL-SCNC: 4.1 MMOL/L (ref 3.5–5.1)
PROTHROMBIN TIME: 10.4 SEC (ref 9–12.5)
RBC # BLD AUTO: 2.24 M/UL (ref 4.6–6.2)
RBC # BLD AUTO: 2.47 M/UL (ref 4.6–6.2)
SODIUM SERPL-SCNC: 153 MMOL/L (ref 136–145)
WBC # BLD AUTO: 11.5 K/UL (ref 3.9–12.7)
WBC # BLD AUTO: 13.48 K/UL (ref 3.9–12.7)

## 2023-04-02 PROCEDURE — 99900026 HC AIRWAY MAINTENANCE (STAT)

## 2023-04-02 PROCEDURE — A4216 STERILE WATER/SALINE, 10 ML: HCPCS | Performed by: INTERNAL MEDICINE

## 2023-04-02 PROCEDURE — 25000003 PHARM REV CODE 250: Performed by: HOSPITALIST

## 2023-04-02 PROCEDURE — 36430 TRANSFUSION BLD/BLD COMPNT: CPT

## 2023-04-02 PROCEDURE — 85025 COMPLETE CBC W/AUTO DIFF WBC: CPT | Mod: 91 | Performed by: STUDENT IN AN ORGANIZED HEALTH CARE EDUCATION/TRAINING PROGRAM

## 2023-04-02 PROCEDURE — 85025 COMPLETE CBC W/AUTO DIFF WBC: CPT | Performed by: HOSPITALIST

## 2023-04-02 PROCEDURE — 63600175 PHARM REV CODE 636 W HCPCS: Performed by: STUDENT IN AN ORGANIZED HEALTH CARE EDUCATION/TRAINING PROGRAM

## 2023-04-02 PROCEDURE — 85384 FIBRINOGEN ACTIVITY: CPT | Performed by: STUDENT IN AN ORGANIZED HEALTH CARE EDUCATION/TRAINING PROGRAM

## 2023-04-02 PROCEDURE — 25000003 PHARM REV CODE 250: Performed by: INTERNAL MEDICINE

## 2023-04-02 PROCEDURE — 94640 AIRWAY INHALATION TREATMENT: CPT

## 2023-04-02 PROCEDURE — C9113 INJ PANTOPRAZOLE SODIUM, VIA: HCPCS | Performed by: STUDENT IN AN ORGANIZED HEALTH CARE EDUCATION/TRAINING PROGRAM

## 2023-04-02 PROCEDURE — 99900035 HC TECH TIME PER 15 MIN (STAT)

## 2023-04-02 PROCEDURE — 25000003 PHARM REV CODE 250: Performed by: REGISTERED NURSE

## 2023-04-02 PROCEDURE — 25000003 PHARM REV CODE 250: Performed by: STUDENT IN AN ORGANIZED HEALTH CARE EDUCATION/TRAINING PROGRAM

## 2023-04-02 PROCEDURE — 27000221 HC OXYGEN, UP TO 24 HOURS

## 2023-04-02 PROCEDURE — 63600175 PHARM REV CODE 636 W HCPCS: Performed by: INTERNAL MEDICINE

## 2023-04-02 PROCEDURE — 85730 THROMBOPLASTIN TIME PARTIAL: CPT | Performed by: STUDENT IN AN ORGANIZED HEALTH CARE EDUCATION/TRAINING PROGRAM

## 2023-04-02 PROCEDURE — P9016 RBC LEUKOCYTES REDUCED: HCPCS | Performed by: STUDENT IN AN ORGANIZED HEALTH CARE EDUCATION/TRAINING PROGRAM

## 2023-04-02 PROCEDURE — 36415 COLL VENOUS BLD VENIPUNCTURE: CPT | Performed by: STUDENT IN AN ORGANIZED HEALTH CARE EDUCATION/TRAINING PROGRAM

## 2023-04-02 PROCEDURE — 20000000 HC ICU ROOM

## 2023-04-02 PROCEDURE — 85610 PROTHROMBIN TIME: CPT | Performed by: STUDENT IN AN ORGANIZED HEALTH CARE EDUCATION/TRAINING PROGRAM

## 2023-04-02 PROCEDURE — 80048 BASIC METABOLIC PNL TOTAL CA: CPT | Performed by: STUDENT IN AN ORGANIZED HEALTH CARE EDUCATION/TRAINING PROGRAM

## 2023-04-02 RX ORDER — HYDROCODONE BITARTRATE AND ACETAMINOPHEN 500; 5 MG/1; MG/1
TABLET ORAL
Status: DISCONTINUED | OUTPATIENT
Start: 2023-04-02 | End: 2023-04-10

## 2023-04-02 RX ORDER — DEXTROSE MONOHYDRATE 50 MG/ML
INJECTION, SOLUTION INTRAVENOUS CONTINUOUS
Status: ACTIVE | OUTPATIENT
Start: 2023-04-02 | End: 2023-04-03

## 2023-04-02 RX ORDER — DEXTROSE MONOHYDRATE 50 MG/ML
INJECTION, SOLUTION INTRAVENOUS CONTINUOUS
Status: DISCONTINUED | OUTPATIENT
Start: 2023-04-02 | End: 2023-04-02

## 2023-04-02 RX ADMIN — DEXTROSE MONOHYDRATE: 50 INJECTION, SOLUTION INTRAVENOUS at 06:04

## 2023-04-02 RX ADMIN — ACETAMINOPHEN 650 MG: 325 TABLET ORAL at 08:04

## 2023-04-02 RX ADMIN — CLOPIDOGREL BISULFATE 75 MG: 75 TABLET ORAL at 08:04

## 2023-04-02 RX ADMIN — GLYCOPYRROLATE 1 MG: 1 TABLET ORAL at 08:04

## 2023-04-02 RX ADMIN — ATORVASTATIN CALCIUM 80 MG: 40 TABLET, FILM COATED ORAL at 08:04

## 2023-04-02 RX ADMIN — MORPHINE SULFATE 2 MG: 4 INJECTION INTRAVENOUS at 08:04

## 2023-04-02 RX ADMIN — HYDROXYZINE HYDROCHLORIDE 25 MG: 25 TABLET ORAL at 08:04

## 2023-04-02 RX ADMIN — GUAIFENESIN 400 MG: 200 SOLUTION ORAL at 08:04

## 2023-04-02 RX ADMIN — LIDOCAINE 5% 2 PATCH: 700 PATCH TOPICAL at 02:04

## 2023-04-02 RX ADMIN — OXYCODONE HYDROCHLORIDE 5 MG: 5 TABLET ORAL at 05:04

## 2023-04-02 RX ADMIN — ASPIRIN 81 MG CHEWABLE TABLET 81 MG: 81 TABLET CHEWABLE at 08:04

## 2023-04-02 RX ADMIN — TRANEXAMIC ACID 500 MG: 100 INJECTION, SOLUTION INTRAVENOUS at 08:04

## 2023-04-02 RX ADMIN — INSULIN ASPART 2 UNITS: 100 INJECTION, SOLUTION INTRAVENOUS; SUBCUTANEOUS at 08:04

## 2023-04-02 RX ADMIN — Medication 10 ML: at 12:04

## 2023-04-02 RX ADMIN — PANTOPRAZOLE SODIUM 40 MG: 40 INJECTION, POWDER, FOR SOLUTION INTRAVENOUS at 08:04

## 2023-04-02 RX ADMIN — FERROUS SULFATE TAB 325 MG (65 MG ELEMENTAL FE) 1 EACH: 325 (65 FE) TAB at 08:04

## 2023-04-02 RX ADMIN — MORPHINE SULFATE 2 MG: 4 INJECTION INTRAVENOUS at 06:04

## 2023-04-02 RX ADMIN — OXYCODONE HYDROCHLORIDE 5 MG: 5 TABLET ORAL at 08:04

## 2023-04-02 RX ADMIN — GLYCOPYRROLATE 1 MG: 1 TABLET ORAL at 03:04

## 2023-04-02 NOTE — ASSESSMENT & PLAN NOTE
At home is on 5L O2 via trach and O2 sats typically in high 80s low 90s. Worsening respiration this admission due to aspiration of blood and likely food aspiration. Required ventilator from 3/21 to 3/23, subsequently weaned. Treated empirically for pneumonia. Respiratory cultures show bugs not susceptible to the antibiotics given, however given the overall clinical respiratory improvement will not restart antibiotics.   - pulm/crit, ENT following peripherally  - Currently on 10 liters trach collar  - diuresis held due to soft blood pressures and blood loss

## 2023-04-02 NOTE — ASSESSMENT & PLAN NOTE
Continue medications via PEG. Does not take oral intake.   - On isosource 1.5 6 days daily with 120cc free water flushes via wife  -Will continue home tube feedings, with nutrition consulted

## 2023-04-02 NOTE — ASSESSMENT & PLAN NOTE
Dx 2021, now s/p total glossectomy, bilateral neck dissection, bilateral cervical facial flaps and anterolateral thigh flap reconstruction of glossectomy defect 1/4/22. Completed adjuvant chemoradiation. Had trach changed by ENT 2 days prior to admit and scope at that time showed no signs of bleeding.

## 2023-04-02 NOTE — ASSESSMENT & PLAN NOTE
Pt presented with hemoptysis. Scopes prior to admit and by ENT during admit not showing clear source. Bleeding improved. Pt underwent LHC for NSTEMI. After that procedure he was found to have a large retroperitoneal hematoma. Since starting DAPT he has also had recurrent hemoptysis as well as melanic stools. Trach cuff reinflated 4/2 with improvement in bleeding.  - trend CBC, transfuse prn  - empiric PPI for melena, however it appears this is blood from hemoptysis that was swallowed

## 2023-04-02 NOTE — PLAN OF CARE
Trach in place and secure. PeG TUBE secured and patent. ST. 1.5 justa isosource given and tolerated. Lainez in place and secure. Multiple dark stools. Skin intact. PICC line in place. No GTT. Care updated with pt. Free from fall and injuries.

## 2023-04-02 NOTE — NURSING
[facility]  Nurses Note -- 4 Eyes      4/1/2023       Skin assessed on: Q Shift      [x] No Pressure Injuries Present    [x]Prevention Measures Documented    [] Yes LDA  for Pressure Injury Previously documented     [] Yes New Pressure Injury Discovered   [] LDA for New Pressure Injury Added      Attending RN:  JLUIS BURR RN     Second RN:  KAMILAH Jacobs

## 2023-04-02 NOTE — PROGRESS NOTES
[facility] Medicine  Progress Note    Patient Name: Fareed Richard Jr.  MRN: [id number]  Patient Class: IP- Inpatient   Admission Date: 3/18/2023  Length of Stay: 14 days  Attending Physician: Kelby Sargent MD  Primary Care Provider: Kodi Tubbs MD        Subjective:     Principal Problem:Acute blood loss anemia        HPI:  Fareed Richard Jr. 73 y.o. 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  Per his wife 4 days ago he began to have pink tinged sputum via his trach.  He was seen by his ENT 2 days ago with a scope exam and a trach exchange without evidence of bleeding.  This morning at 3:00 a.m. he developed bright red blood via trach which resolved without intervention.  It is since not recurred.  He takes a daily aspirin.  He receives all medications via G-tube.  His tube feeding regimen consists of 6 cans of Isosource daily with 120 cc free water boluses.  He denies fever chest pain shortness of breath nausea vomiting abdominal pain leg swelling syncope dizziness dysuria melena hematuria hematemesis.    In the ED, hemoglobin 7.6 INR 1.0 BUN 50 creatinine 0.7  troponin negative BRCA negative O positive type and screen chest x-ray without detrimental change hypotensive to 85/54.      Overview/Hospital Course:  74 yo M w squamous CA of tongue s/p glossectomy and reconstructive flap with trach, CAD, chronic hypoxic respiratory failure (on 5L at home) and with peg presented for eval of hemoptysis 2 days after trach change in clinic.  Transferred to ICU 3/19 when markedly hypotensive.  Unclear if due to hemorrhage, cardiogenic or septic shock.  Did require PRBC and TXA nebs but didn't seem like sufficient bleeding to cause shock.  Bleeding has stopped and ENT is on board and following.  CTA shows either significant mucus plugging or bibasilar pneumonia -  pulmonology favored pneumonia.  Also has urine culture growing Enterococcus.  He is on broad spectrum antibiotics. Troponin checked due to hypotension and it was 1.17.  Cards consulted and feel it's demand due to ischemia and hypotension and recommend outpatient stress. AM cortisol was low so ordered cosyntropin stim test which shows an adequate adrenal response to r/o adrenal insufficiency.  Developed severe shoulder pain and hypoxia, increasing troponin and pulmonary edema on CXR - shoulder pain likely anginal equivalent. Discussed with Cardiology, started heparin and nitro drip. Required nitro drip for chest pain. Tolerating heparin drip without evidence of further tracheostomy bleeding. Further discussions with Cardiology, and brought patient for LHC/angiogram on 3/23 which showed distal LM 30%, mild LAD 90% bifurcation lesion with D1, Cx mid 80%, RCA moderate diffuse disease with long 70% and some left to right collateral. All vessels heavily calcified. Not a candidate for CABG but plan for staged PCI. Continues on heparin drip, asa/plavix and tolerating. Off pressor support/nitro drip. No further bleeding and Hb remains stable. Had episodes of hypoxia/respiratory distress after vomiting and was intermittently placed on ventilator -  now back to trach collar close to home O2 requirements. Plan for angiogram/PCI on 3/27 with Dr. Bhatt.    Pt went for LHC w/ PCI to LAD and Lcx. Post operatively he was hemodynamically unstable and anemic. Stat CTA showed RP hematoma near L kidney without acute bleeding. Pt transfused supportively. He improved over the next few days, however still required periodic transfusion. On 3/31 he started coughing up blood from his trach and became hypotensive/tachycardic.      Interval History: No events overnight. Bleeding/hemoptysis appears improved.    Review of Systems   Constitutional:  Negative for chills and fever.   Respiratory:  Positive for cough and shortness of breath.     Cardiovascular:  Negative for chest pain and leg swelling.   Gastrointestinal:  Negative for abdominal distention and abdominal pain.   Objective:     Vital Signs (Most Recent):  Temp: 97.8 °F (36.6 °C) (04/02/23 0315)  Pulse: (!) 111 (04/02/23 0818)  Resp: 18 (04/02/23 0827)  BP: 94/62 (04/02/23 0818)  SpO2: 99 % (04/02/23 0818)   Vital Signs (24h Range):  Temp:  [97 °F (36.1 °C)-98 °F (36.7 °C)] 97.8 °F (36.6 °C)  Pulse:  [] 111  Resp:  [16-33] 18  SpO2:  [68 %-100 %] 99 %  BP: ()/(50-72) 94/62     Weight: 68 kg (150 lb)  Body mass index is 22.81 kg/m².    Intake/Output Summary (Last 24 hours) at 4/2/2023 0943  Last data filed at 4/2/2023 0600  Gross per 24 hour   Intake 1810 ml   Output 1045 ml   Net 765 ml      Physical Exam  Constitutional:       General: He is not in acute distress.     Appearance: He is ill-appearing. He is not toxic-appearing or diaphoretic.   Cardiovascular:      Rate and Rhythm: Normal rate and regular rhythm.      Heart sounds: No murmur heard.    No gallop.   Pulmonary:      Effort: No respiratory distress.      Breath sounds: No wheezing.      Comments: B/l coarse breath sounds  Abdominal:      General: Bowel sounds are normal. There is no distension.      Palpations: Abdomen is soft.      Tenderness: There is no abdominal tenderness.       Significant Labs: All pertinent labs within the past 24 hours have been reviewed.    Significant Imaging: I have reviewed all pertinent imaging results/findings within the past 24 hours.      Assessment/Plan:      * Acute blood loss anemia  Pt presented with hemoptysis. Scopes prior to admit and by ENT during admit not showing clear source. Bleeding improved. Pt underwent LHC for NSTEMI. After that procedure he was found to have a large retroperitoneal hematoma. Since starting DAPT he has also had recurrent hemoptysis as well as melanic stools. Trach cuff reinflated 4/2 with improvement in bleeding.  - trend CBC, transfuse prn  -  "empiric PPI for melena, however it appears this is blood from hemoptysis that was swallowed    Retroperitoneal hematoma  RP hematoma discovered after LHC. See "acute blood loss anemia"      NSTEMI (non-ST elevated myocardial infarction)  See "coronary artery disease"      Anxiety  Anxiety waxes and wanes with clinical status    UTI (urinary tract infection)  Urine culture growing Enterococcus > 100,000 cfu/ml, s/p appropriate treatment      Hemoptysis  -See acute blood loss anemia    Hypotension  Presented with hypotension and bleeding from tracheostomy/hemoptysis.  Patient does have lower blood pressure readings however this blood pressure was concerning in the 70s.  Patient did have some bleeding but did not suspect it was hemorrhagic.  Also thought to be septic due to possible UTI/pneumonia and being treated for antibiotics.  Also concerns for cardiogenic with an NSTEMI and being on nitro.  He was treated appropriately with antibiotics for his UTI/pneumonia and was also treated for NSTEMI.  Intermittently requiring pressor support with sedation after he was put on the ventilator.  Now on trach collar.  Hypotension has now resolved and he is doing well.    Pt went into hemorrhagic shock on 3/27 w/ L RP bleed. No evidence of continued bleeding on CTA. Levophed weaned.        Elevated brain natriuretic peptide (BNP) level  Diuresis held due to soft blood pressures      PEG (percutaneous endoscopic gastrostomy) status  Continue medications via PEG. Does not take oral intake.   - On isosource 1.5 6 days daily with 120cc free water flushes via wife  -Will continue home tube feedings, with nutrition consulted    Tracheostomy present  See above    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   -TSH is normal and he is not on treatment as outpatient    ACP (advance care planning)  Appreciate palliative care involvement.      Acute on chronic respiratory failure with hypoxia  At home is on 5L O2 via " trach and O2 sats typically in high 80s low 90s. Worsening respiration this admission due to aspiration of blood and likely food aspiration. Required ventilator from 3/21 to 3/23, subsequently weaned. Treated empirically for pneumonia. Respiratory cultures show bugs not susceptible to the antibiotics given, however given the overall clinical respiratory improvement will not restart antibiotics.   - pulm/crit, ENT following peripherally  - Currently on 10 liters trach collar  - diuresis held due to soft blood pressures and blood loss    Other hyperlipidemia  -Continue home statin    Primary hypertension  -BP typically in the 90s to low 100s systolic per chart review.   -Noted hypotension 3/19 which is addressed above.  -Holding home metoprolol and amlodipine    Coronary artery disease involving native coronary artery of native heart with unstable angina pectoris  Troponin elevated when pt was upgraded to the ICU. At that time it was thought to be due to demand, however he had persistent chest pain concerning for ACS. Was started on heparin gtt and tolerated. Subsequently went to [facility] which showed diffuse disease. Pt not a candidate for CABG. He subsequently underwent PCI of LAD and LCx.  - DAPT  - holding metoprol due to soft pressures    Squamous cell cancer of tongue  Dx 2021, now s/p total glossectomy, bilateral neck dissection, bilateral cervical facial flaps and anterolateral thigh flap reconstruction of glossectomy defect 1/4/22. Completed adjuvant chemoradiation. Had trach changed by ENT 2 days prior to admit and scope at that time showed no signs of bleeding.         VTE Risk Mitigation (From admission, onward)         Ordered     IP VTE HIGH RISK PATIENT  Once         03/18/23 1620     Place sequential compression device  Until discontinued         03/18/23 1620     Place NIKITA hose  Until discontinued         03/18/23 1620                Discharge Planning   NISA:      Code Status: Full Code   Is the patient  medically ready for discharge?:     Reason for patient still in hospital (select all that apply): Patient trending condition, Laboratory test, Treatment, Consult recommendations and Pending disposition  Discharge Plan A: Home with family   Discharge Delays: None known at this time        Critical care time spent on the evaluation and treatment of severe organ dysfunction, review of pertinent labs and imaging studies, discussions with consulting providers and discussions with patient/family: 45 minutes.      Kelby Sargent MD  Department of Hospital Medicine   [facility] - Intensive Care

## 2023-04-02 NOTE — SUBJECTIVE & OBJECTIVE
Interval History: No events overnight. Bleeding/hemoptysis appears improved.    Review of Systems   Constitutional:  Negative for chills and fever.   Respiratory:  Positive for cough and shortness of breath.    Cardiovascular:  Negative for chest pain and leg swelling.   Gastrointestinal:  Negative for abdominal distention and abdominal pain.   Objective:     Vital Signs (Most Recent):  Temp: 97.8 °F (36.6 °C) (04/02/23 0315)  Pulse: (!) 111 (04/02/23 0818)  Resp: 18 (04/02/23 0827)  BP: 94/62 (04/02/23 0818)  SpO2: 99 % (04/02/23 0818)   Vital Signs (24h Range):  Temp:  [97 °F (36.1 °C)-98 °F (36.7 °C)] 97.8 °F (36.6 °C)  Pulse:  [] 111  Resp:  [16-33] 18  SpO2:  [68 %-100 %] 99 %  BP: ()/(50-72) 94/62     Weight: 68 kg (150 lb)  Body mass index is 22.81 kg/m².    Intake/Output Summary (Last 24 hours) at 4/2/2023 0943  Last data filed at 4/2/2023 0600  Gross per 24 hour   Intake 1810 ml   Output 1045 ml   Net 765 ml      Physical Exam  Constitutional:       General: He is not in acute distress.     Appearance: He is ill-appearing. He is not toxic-appearing or diaphoretic.   Cardiovascular:      Rate and Rhythm: Normal rate and regular rhythm.      Heart sounds: No murmur heard.    No gallop.   Pulmonary:      Effort: No respiratory distress.      Breath sounds: No wheezing.      Comments: B/l coarse breath sounds  Abdominal:      General: Bowel sounds are normal. There is no distension.      Palpations: Abdomen is soft.      Tenderness: There is no abdominal tenderness.       Significant Labs: All pertinent labs within the past 24 hours have been reviewed.    Significant Imaging: I have reviewed all pertinent imaging results/findings within the past 24 hours.

## 2023-04-02 NOTE — PROGRESS NOTES
[facility]  Nurses Note -- 4 Eyes      4/1/2023       Skin assessed on: Q Shift      [x] No Pressure Injuries Present    [x]Prevention Measures Documented    [] Yes LDA  for Pressure Injury Previously documented     [] Yes New Pressure Injury Discovered   [] LDA for New Pressure Injury Added      Attending RN:  Arlene Tillman RN     Second RN: Jeni TRIANA

## 2023-04-02 NOTE — ASSESSMENT & PLAN NOTE
Troponin elevated when pt was upgraded to the ICU. At that time it was thought to be due to demand, however he had persistent chest pain concerning for ACS. Was started on heparin gtt and tolerated. Subsequently went to [facility] which showed diffuse disease. Pt not a candidate for CABG. He subsequently underwent PCI of LAD and LCx.  - DAPT  - holding metoprol due to soft pressures

## 2023-04-02 NOTE — NURSING
[facility] Intensive Care  ICU Shift Summary  Date: 4/2/2023      Prehospitalization: Home  Admit Date / LOS : 3/18/2023/ 14 days    Diagnosis: Acute blood loss anemia    Consults:        Active: Cardio and Pulm CC       Needed: N/A     Code Status: Full Code   Advanced Directive: Not Received    LDA:  Lines/Drains/Airways       Peripherally Inserted Central Catheter Line  Duration             PICC Triple Lumen 03/20/23 2145 right basilic 12 days              Drain  Duration                  Gastrostomy/Enterostomy 01/04/22 Percutaneous endoscopic gastrostomy (PEG)  days         Urethral Catheter 03/31/23 1905 Non-latex 16 Fr. 1 day              Airway  Duration             Adult Surgical Airway 03/16/23 1014 Shiley Uncuffed 6.0 16 days                  Central Lines/Site/Justification: PICC  Urinary Cath/Order/Justification:Urinary Retention    Vasopressors/Infusions:    dexmedeTOMIDine (Precedex) infusion (titrating) Stopped (03/24/23 1100)    nitroGLYCERIN            GOALS: Volume/ Hemodynamic: N/A                     RASS: N/A    Pain Management: PO       Pain Controlled: yes     Rhythm: ST    Respiratory Device: Trach  Oxygen Concentration (%):  [40] 40                 Most Recent SBT/ SAT: N/A       MOVE Screen: PASS  ICU Liberation: no    VTE Prophylaxis: Pharm and Mechanical  Mobility: Bedrest  Stress Ulcer Prophylaxis: Yes    Isolation: No active isolations    Dietary:   Current Diet Order   Procedures    Diet NPO Except for: Medication, Sips with Medication     Order Specific Question:   Except for     Answer:   Medication     Order Specific Question:   Except for     Answer:   Sips with Medication      Tolerance: yes  Advancement: @ goal    I & O (24h):    Intake/Output Summary (Last 24 hours) at 4/2/2023 0539  Last data filed at 4/2/2023 0500  Gross per 24 hour   Intake 1840 ml   Output 1206 ml   Net 634 ml        Restraints: No    Significant Dates:  Post Op Date: N/A  Rescue Date: N/A  Imaging/  Diagnostics: N/A    Noteworthy Labs:  Hgb and fibrinogen    COVID Test: (--)  CBC/Anemia Labs: Coags:    Recent Labs   Lab 04/01/23  1447 04/02/23  0322   WBC 11.81 13.48*   HGB 7.0* 6.9*   HCT 22.6* 23.5*    195   MCV 93 95   RDW 17.2* 17.2*    Recent Labs   Lab 03/27/23  2143 03/28/23  0427 04/02/23  0322   INR 1.2  --  1.0   APTT 32.8* 26.5 23.8        Chemistries:   Recent Labs   Lab 03/29/23  0944 03/31/23  0407    145   K 4.1 3.7    103   CO2 33* 38*   BUN 46* 47*   CREATININE 1.0 1.0   CALCIUM 8.4* 8.8   PROT 5.3* 5.5*   BILITOT 0.6 0.5   ALKPHOS 72 74   ALT 57* 47*   AST 40 28        Cardiac Enzymes: Ejection Fractions:    No results for input(s): CPK, CPKMB, MB, TROPONINI in the last 72 hours. EF   Date Value Ref Range Status   03/20/2023 60 % Final        POCT Glucose: HbA1c:    Recent Labs   Lab 03/31/23  2019 04/01/23  1133 04/01/23 2128   POCTGLUCOSE 282* 152* 244*    Hemoglobin A1C   Date Value Ref Range Status   09/27/2022 6.3 (H) 4.0 - 5.6 % Final     Comment:     ADA Screening Guidelines:  5.7-6.4%  Consistent with prediabetes  >or=6.5%  Consistent with diabetes    High levels of fetal hemoglobin interfere with the HbA1C  assay. Heterozygous hemoglobin variants (HbS, HgC, etc)do  not significantly interfere with this assay.   However, presence of multiple variants may affect accuracy.             ICU LOS 13d 16h  Level of Care: Critical Care    Chart Check: 12 HR Done  Shift Summary/Plan for the shift: none

## 2023-04-03 LAB
ALBUMIN SERPL BCP-MCNC: 2.5 G/DL (ref 3.5–5.2)
ALP SERPL-CCNC: 100 U/L (ref 55–135)
ALT SERPL W/O P-5'-P-CCNC: 28 U/L (ref 10–44)
ANION GAP SERPL CALC-SCNC: 11 MMOL/L (ref 8–16)
AST SERPL-CCNC: 39 U/L (ref 10–40)
BASOPHILS # BLD AUTO: 0.01 K/UL (ref 0–0.2)
BASOPHILS # BLD AUTO: 0.01 K/UL (ref 0–0.2)
BASOPHILS NFR BLD: 0.1 % (ref 0–1.9)
BASOPHILS NFR BLD: 0.1 % (ref 0–1.9)
BILIRUB SERPL-MCNC: 0.8 MG/DL (ref 0.1–1)
BUN SERPL-MCNC: 46 MG/DL (ref 8–23)
CALCIUM SERPL-MCNC: 8.7 MG/DL (ref 8.7–10.5)
CHLORIDE SERPL-SCNC: 109 MMOL/L (ref 95–110)
CO2 SERPL-SCNC: 32 MMOL/L (ref 23–29)
CREAT SERPL-MCNC: 0.9 MG/DL (ref 0.5–1.4)
DIFFERENTIAL METHOD: ABNORMAL
DIFFERENTIAL METHOD: ABNORMAL
EOSINOPHIL # BLD AUTO: 0.1 K/UL (ref 0–0.5)
EOSINOPHIL # BLD AUTO: 0.1 K/UL (ref 0–0.5)
EOSINOPHIL NFR BLD: 1.5 % (ref 0–8)
EOSINOPHIL NFR BLD: 1.5 % (ref 0–8)
ERYTHROCYTE [DISTWIDTH] IN BLOOD BY AUTOMATED COUNT: 17.8 % (ref 11.5–14.5)
ERYTHROCYTE [DISTWIDTH] IN BLOOD BY AUTOMATED COUNT: 17.8 % (ref 11.5–14.5)
EST. GFR  (NO RACE VARIABLE): >60 ML/MIN/1.73 M^2
GLUCOSE SERPL-MCNC: 136 MG/DL (ref 70–110)
HCT VFR BLD AUTO: 31.9 % (ref 40–54)
HCT VFR BLD AUTO: 31.9 % (ref 40–54)
HGB BLD-MCNC: 10.1 G/DL (ref 14–18)
HGB BLD-MCNC: 10.1 G/DL (ref 14–18)
IMM GRANULOCYTES # BLD AUTO: 0.05 K/UL (ref 0–0.04)
IMM GRANULOCYTES # BLD AUTO: 0.05 K/UL (ref 0–0.04)
IMM GRANULOCYTES NFR BLD AUTO: 0.5 % (ref 0–0.5)
IMM GRANULOCYTES NFR BLD AUTO: 0.5 % (ref 0–0.5)
LYMPHOCYTES # BLD AUTO: 0.4 K/UL (ref 1–4.8)
LYMPHOCYTES # BLD AUTO: 0.4 K/UL (ref 1–4.8)
LYMPHOCYTES NFR BLD: 3.8 % (ref 18–48)
LYMPHOCYTES NFR BLD: 3.8 % (ref 18–48)
MCH RBC QN AUTO: 30 PG (ref 27–31)
MCH RBC QN AUTO: 30 PG (ref 27–31)
MCHC RBC AUTO-ENTMCNC: 31.7 G/DL (ref 32–36)
MCHC RBC AUTO-ENTMCNC: 31.7 G/DL (ref 32–36)
MCV RBC AUTO: 95 FL (ref 82–98)
MCV RBC AUTO: 95 FL (ref 82–98)
MONOCYTES # BLD AUTO: 0.8 K/UL (ref 0.3–1)
MONOCYTES # BLD AUTO: 0.8 K/UL (ref 0.3–1)
MONOCYTES NFR BLD: 8.9 % (ref 4–15)
MONOCYTES NFR BLD: 8.9 % (ref 4–15)
NEUTROPHILS # BLD AUTO: 7.7 K/UL (ref 1.8–7.7)
NEUTROPHILS # BLD AUTO: 7.7 K/UL (ref 1.8–7.7)
NEUTROPHILS NFR BLD: 85.2 % (ref 38–73)
NEUTROPHILS NFR BLD: 85.2 % (ref 38–73)
NRBC BLD-RTO: 0 /100 WBC
NRBC BLD-RTO: 0 /100 WBC
PLATELET # BLD AUTO: 185 K/UL (ref 150–450)
PLATELET # BLD AUTO: 185 K/UL (ref 150–450)
PMV BLD AUTO: 10 FL (ref 9.2–12.9)
PMV BLD AUTO: 10 FL (ref 9.2–12.9)
POCT GLUCOSE: 106 MG/DL (ref 70–110)
POCT GLUCOSE: 126 MG/DL (ref 70–110)
POCT GLUCOSE: 152 MG/DL (ref 70–110)
POTASSIUM SERPL-SCNC: 4 MMOL/L (ref 3.5–5.1)
PROT SERPL-MCNC: 6 G/DL (ref 6–8.4)
RBC # BLD AUTO: 3.37 M/UL (ref 4.6–6.2)
RBC # BLD AUTO: 3.37 M/UL (ref 4.6–6.2)
SODIUM SERPL-SCNC: 152 MMOL/L (ref 136–145)
WBC # BLD AUTO: 9.1 K/UL (ref 3.9–12.7)
WBC # BLD AUTO: 9.1 K/UL (ref 3.9–12.7)

## 2023-04-03 PROCEDURE — 25000003 PHARM REV CODE 250: Performed by: STUDENT IN AN ORGANIZED HEALTH CARE EDUCATION/TRAINING PROGRAM

## 2023-04-03 PROCEDURE — 99900035 HC TECH TIME PER 15 MIN (STAT)

## 2023-04-03 PROCEDURE — 85025 COMPLETE CBC W/AUTO DIFF WBC: CPT | Performed by: HOSPITALIST

## 2023-04-03 PROCEDURE — 20000000 HC ICU ROOM

## 2023-04-03 PROCEDURE — 94640 AIRWAY INHALATION TREATMENT: CPT

## 2023-04-03 PROCEDURE — 99900026 HC AIRWAY MAINTENANCE (STAT)

## 2023-04-03 PROCEDURE — 25000003 PHARM REV CODE 250: Performed by: HOSPITALIST

## 2023-04-03 PROCEDURE — 94761 N-INVAS EAR/PLS OXIMETRY MLT: CPT

## 2023-04-03 PROCEDURE — 25000003 PHARM REV CODE 250: Performed by: INTERNAL MEDICINE

## 2023-04-03 PROCEDURE — 63600175 PHARM REV CODE 636 W HCPCS: Performed by: STUDENT IN AN ORGANIZED HEALTH CARE EDUCATION/TRAINING PROGRAM

## 2023-04-03 PROCEDURE — A4216 STERILE WATER/SALINE, 10 ML: HCPCS | Performed by: INTERNAL MEDICINE

## 2023-04-03 PROCEDURE — C9113 INJ PANTOPRAZOLE SODIUM, VIA: HCPCS | Performed by: STUDENT IN AN ORGANIZED HEALTH CARE EDUCATION/TRAINING PROGRAM

## 2023-04-03 PROCEDURE — 36430 TRANSFUSION BLD/BLD COMPNT: CPT

## 2023-04-03 PROCEDURE — 25000003 PHARM REV CODE 250: Performed by: REGISTERED NURSE

## 2023-04-03 PROCEDURE — 25000242 PHARM REV CODE 250 ALT 637 W/ HCPCS: Performed by: HOSPITALIST

## 2023-04-03 PROCEDURE — 27000221 HC OXYGEN, UP TO 24 HOURS

## 2023-04-03 PROCEDURE — 80053 COMPREHEN METABOLIC PANEL: CPT | Performed by: STUDENT IN AN ORGANIZED HEALTH CARE EDUCATION/TRAINING PROGRAM

## 2023-04-03 RX ORDER — DEXTROSE MONOHYDRATE 50 MG/ML
INJECTION, SOLUTION INTRAVENOUS CONTINUOUS
Status: ACTIVE | OUTPATIENT
Start: 2023-04-03 | End: 2023-04-04

## 2023-04-03 RX ADMIN — Medication 10 ML: at 05:04

## 2023-04-03 RX ADMIN — GLYCOPYRROLATE 1 MG: 1 TABLET ORAL at 08:04

## 2023-04-03 RX ADMIN — DEXTROSE MONOHYDRATE: 50 INJECTION, SOLUTION INTRAVENOUS at 03:04

## 2023-04-03 RX ADMIN — DEXTROSE MONOHYDRATE: 50 INJECTION, SOLUTION INTRAVENOUS at 12:04

## 2023-04-03 RX ADMIN — OXYCODONE HYDROCHLORIDE 5 MG: 5 TABLET ORAL at 02:04

## 2023-04-03 RX ADMIN — ACETAMINOPHEN 650 MG: 325 TABLET ORAL at 04:04

## 2023-04-03 RX ADMIN — OXYCODONE HYDROCHLORIDE 5 MG: 5 TABLET ORAL at 08:04

## 2023-04-03 RX ADMIN — OXYCODONE HYDROCHLORIDE 5 MG: 5 TABLET ORAL at 12:04

## 2023-04-03 RX ADMIN — HYDROXYZINE HYDROCHLORIDE 25 MG: 25 TABLET ORAL at 08:04

## 2023-04-03 RX ADMIN — DEXTROSE MONOHYDRATE: 50 INJECTION, SOLUTION INTRAVENOUS at 07:04

## 2023-04-03 RX ADMIN — GUAIFENESIN 400 MG: 200 SOLUTION ORAL at 08:04

## 2023-04-03 RX ADMIN — Medication 10 ML: at 08:04

## 2023-04-03 RX ADMIN — MELATONIN TAB 3 MG 9 MG: 3 TAB at 08:04

## 2023-04-03 RX ADMIN — IPRATROPIUM BROMIDE AND ALBUTEROL SULFATE 3 ML: 2.5; .5 SOLUTION RESPIRATORY (INHALATION) at 06:04

## 2023-04-03 RX ADMIN — PANTOPRAZOLE SODIUM 40 MG: 40 INJECTION, POWDER, FOR SOLUTION INTRAVENOUS at 08:04

## 2023-04-03 RX ADMIN — METOPROLOL TARTRATE 25 MG: 25 TABLET, FILM COATED ORAL at 08:04

## 2023-04-03 RX ADMIN — ASPIRIN 81 MG CHEWABLE TABLET 81 MG: 81 TABLET CHEWABLE at 08:04

## 2023-04-03 RX ADMIN — GUAIFENESIN 400 MG: 200 SOLUTION ORAL at 04:04

## 2023-04-03 RX ADMIN — Medication 10 ML: at 12:04

## 2023-04-03 RX ADMIN — ATORVASTATIN CALCIUM 80 MG: 40 TABLET, FILM COATED ORAL at 08:04

## 2023-04-03 RX ADMIN — IPRATROPIUM BROMIDE AND ALBUTEROL SULFATE 3 ML: 2.5; .5 SOLUTION RESPIRATORY (INHALATION) at 02:04

## 2023-04-03 RX ADMIN — LIDOCAINE 5% 2 PATCH: 700 PATCH TOPICAL at 12:04

## 2023-04-03 RX ADMIN — CLOPIDOGREL BISULFATE 75 MG: 75 TABLET ORAL at 08:04

## 2023-04-03 RX ADMIN — GLYCOPYRROLATE 1 MG: 1 TABLET ORAL at 02:04

## 2023-04-03 RX ADMIN — FERROUS SULFATE TAB 325 MG (65 MG ELEMENTAL FE) 1 EACH: 325 (65 FE) TAB at 08:04

## 2023-04-03 NOTE — PROGRESS NOTES
[facility]  Progress Note    Patient Name: Fareed Richard Jr.  MRN: [id number]  Patient Class: IP- Inpatient   Admission Date: 3/18/2023  Length of Stay: 15 days  Attending Physician: Kelby Sargent MD  Primary Care Provider: Kodi Tubbs MD        Subjective:     Principal Problem:Acute blood loss anemia        HPI:  Fareed Richard Jr. 73 y.o. 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  Per his wife 4 days ago he began to have pink tinged sputum via his trach.  He was seen by his ENT 2 days ago with a scope exam and a trach exchange without evidence of bleeding.  This morning at 3:00 a.m. he developed bright red blood via trach which resolved without intervention.  It is since not recurred.  He takes a daily aspirin.  He receives all medications via G-tube.  His tube feeding regimen consists of 6 cans of Isosource daily with 120 cc free water boluses.  He denies fever chest pain shortness of breath nausea vomiting abdominal pain leg swelling syncope dizziness dysuria melena hematuria hematemesis.    In the ED, hemoglobin 7.6 INR 1.0 BUN 50 creatinine 0.7  troponin negative BRCA negative O positive type and screen chest x-ray without detrimental change hypotensive to 85/54.      Overview/Hospital Course:  74 yo M w squamous CA of tongue s/p glossectomy and reconstructive flap with trach, CAD, chronic hypoxic respiratory failure (on 5L at home) and with peg presented for eval of hemoptysis 2 days after trach change in clinic.  Transferred to ICU 3/19 when markedly hypotensive.  Unclear if due to hemorrhage, cardiogenic or septic shock.  Did require PRBC and TXA nebs but didn't seem like sufficient bleeding to cause shock.  Bleeding has stopped and ENT is on board and following.  CTA shows either significant mucus plugging or bibasilar pneumonia -  pulmonology favored pneumonia.  Also has urine culture growing Enterococcus.  He is on broad spectrum antibiotics. Troponin checked due to hypotension and it was 1.17.  Cards consulted and feel it's demand due to ischemia and hypotension and recommend outpatient stress. AM cortisol was low so ordered cosyntropin stim test which shows an adequate adrenal response to r/o adrenal insufficiency.  Developed severe shoulder pain and hypoxia, increasing troponin and pulmonary edema on CXR - shoulder pain likely anginal equivalent. Discussed with Cardiology, started heparin and nitro drip. Required nitro drip for chest pain. Tolerating heparin drip without evidence of further tracheostomy bleeding. Further discussions with Cardiology, and brought patient for LHC/angiogram on 3/23 which showed distal LM 30%, mild LAD 90% bifurcation lesion with D1, Cx mid 80%, RCA moderate diffuse disease with long 70% and some left to right collateral. All vessels heavily calcified. Not a candidate for CABG but plan for staged PCI. Continues on heparin drip, asa/plavix and tolerating. Off pressor support/nitro drip. No further bleeding and Hb remains stable. Had episodes of hypoxia/respiratory distress after vomiting and was intermittently placed on ventilator -  now back to trach collar close to home O2 requirements. Plan for angiogram/PCI on 3/27 with Dr. Bhatt.    Pt went for LHC w/ PCI to LAD and Lcx. Post operatively he was hemodynamically unstable and anemic. Stat CTA showed RP hematoma near L kidney without acute bleeding. Pt transfused supportively. He improved over the next few days, however still required periodic transfusion. On 3/31 he started coughing up blood from his trach and became hypotensive/tachycardic.      Interval History: No events overnight. Hgb stable.    Review of Systems   Constitutional:  Negative for chills and fever.   Respiratory:  Negative for cough and shortness of breath.    Cardiovascular:  Negative for  chest pain and leg swelling.   Gastrointestinal:  Negative for abdominal distention and abdominal pain.   Objective:     Vital Signs (Most Recent):  Temp: 98.3 °F (36.8 °C) (04/03/23 0715)  Pulse: 101 (04/03/23 0625)  Resp: (!) 27 (04/03/23 0813)  BP: 114/76 (04/03/23 0600)  SpO2: 97 % (04/03/23 0625)   Vital Signs (24h Range):  Temp:  [97.2 °F (36.2 °C)-98.3 °F (36.8 °C)] 98.3 °F (36.8 °C)  Pulse:  [101-112] 101  Resp:  [17-39] 27  SpO2:  [86 %-100 %] 97 %  BP: ()/(51-88) 114/76     Weight: 67 kg (147 lb 11.3 oz)  Body mass index is 22.46 kg/m².    Intake/Output Summary (Last 24 hours) at 4/3/2023 0823  Last data filed at 4/3/2023 0715  Gross per 24 hour   Intake 1998.86 ml   Output 1183 ml   Net 815.86 ml      Physical Exam  Constitutional:       General: He is not in acute distress.     Appearance: He is ill-appearing. He is not toxic-appearing or diaphoretic.   Cardiovascular:      Rate and Rhythm: Normal rate and regular rhythm.      Heart sounds: No murmur heard.    No gallop.   Pulmonary:      Effort: Pulmonary effort is normal. No respiratory distress.      Breath sounds: Normal breath sounds. No wheezing.   Abdominal:      General: Bowel sounds are normal. There is no distension.      Palpations: Abdomen is soft.      Tenderness: There is no abdominal tenderness.       Significant Labs: All pertinent labs within the past 24 hours have been reviewed.    Significant Imaging: I have reviewed all pertinent imaging results/findings within the past 24 hours.      Assessment/Plan:      * Acute blood loss anemia  Pt presented with hemoptysis. Scopes prior to admit and by ENT during admit not showing clear source. Bleeding improved. Pt underwent LHC for NSTEMI. After that procedure he was found to have a large retroperitoneal hematoma. Since starting DAPT he has also had recurrent hemoptysis as well as melanic stools. Trach cuff reinflated 4/2 with improvement in bleeding.  - trend CBC, transfuse prn  -  "empiric PPI for melena, however it appears this is blood from hemoptysis that was swallowed    Retroperitoneal hematoma  RP hematoma discovered after LHC. See "acute blood loss anemia"      NSTEMI (non-ST elevated myocardial infarction)  See "coronary artery disease"      Anxiety  Anxiety waxes and wanes with clinical status    UTI (urinary tract infection)  Urine culture growing Enterococcus > 100,000 cfu/ml, s/p appropriate treatment      Hemoptysis  -See acute blood loss anemia    Hypotension  Presented with hypotension and bleeding from tracheostomy/hemoptysis.  Patient does have lower blood pressure readings however this blood pressure was concerning in the 70s.  Patient did have some bleeding but did not suspect it was hemorrhagic.  Also thought to be septic due to possible UTI/pneumonia and being treated for antibiotics.  Also concerns for cardiogenic with an NSTEMI and being on nitro.  He was treated appropriately with antibiotics for his UTI/pneumonia and was also treated for NSTEMI.  Intermittently requiring pressor support with sedation after he was put on the ventilator.  Now on trach collar.  Hypotension has now resolved and he is doing well.    Pt went into hemorrhagic shock on 3/27 w/ L RP bleed. No evidence of continued bleeding on CTA. Levophed weaned.        Elevated brain natriuretic peptide (BNP) level  Diuresis held due to soft blood pressures      PEG (percutaneous endoscopic gastrostomy) status  Continue medications via PEG. Does not take oral intake.   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      Acute on chronic respiratory failure with hypoxia  At home is on 5L O2 via " trach and O2 sats typically in high 80s low 90s. Worsening respiration this admission due to aspiration of blood and likely food aspiration. Required ventilator from 3/21 to 3/23, subsequently weaned. Treated empirically for pneumonia. Respiratory cultures show bugs not susceptible to the antibiotics given, however given the overall clinical respiratory improvement will not restart antibiotics.   - pulm/crit, ENT following peripherally  - Currently on 10 liters trach collar  - diuresis held due to soft blood pressures and blood loss    Other hyperlipidemia  -Continue home statin    Primary hypertension  -BP typically in the 90s to low 100s systolic per chart review.   -Noted hypotension 3/19 which is addressed above.  -Holding home metoprolol and amlodipine    Coronary artery disease involving native coronary artery of native heart with unstable angina pectoris  Troponin elevated when pt was upgraded to the ICU. At that time it was thought to be due to demand, however he had persistent chest pain concerning for ACS. Was started on heparin gtt and tolerated. Subsequently went to [facility] which showed diffuse disease. Pt not a candidate for CABG. He subsequently underwent PCI of LAD and LCx.  - DAPT  - holding metoprol due to soft pressures    Squamous cell cancer of tongue  Dx 2021, now s/p total glossectomy, bilateral neck dissection, bilateral cervical facial flaps and anterolateral thigh flap reconstruction of glossectomy defect 1/4/22. Completed adjuvant chemoradiation. Had trach changed by ENT 2 days prior to admit and scope at that time showed no signs of bleeding.         VTE Risk Mitigation (From admission, onward)         Ordered     IP VTE HIGH RISK PATIENT  Once         03/18/23 1620     Place sequential compression device  Until discontinued         03/18/23 1620     Place NIKITA hose  Until discontinued         03/18/23 1620                Discharge Planning   NISA:      Code Status: Full Code   Is the patient  medically ready for discharge?:     Reason for patient still in hospital (select all that apply): Patient trending condition, Laboratory test, Treatment, Consult recommendations and Pending disposition  Discharge Plan A: Home with family   Discharge Delays: None known at this time        Critical care time spent on the evaluation and treatment of severe organ dysfunction, review of pertinent labs and imaging studies, discussions with consulting providers and discussions with patient/family: 55 minutes.      Kelby Sargent MD  Department of Hospital Medicine   [facility] - [place] - Intensive Care

## 2023-04-03 NOTE — SUBJECTIVE & OBJECTIVE
Interval History: No events overnight. Hgb stable.    Review of Systems   Constitutional:  Negative for chills and fever.   Respiratory:  Negative for cough and shortness of breath.    Cardiovascular:  Negative for chest pain and leg swelling.   Gastrointestinal:  Negative for abdominal distention and abdominal pain.   Objective:     Vital Signs (Most Recent):  Temp: 98.3 °F (36.8 °C) (04/03/23 0715)  Pulse: 101 (04/03/23 0625)  Resp: (!) 27 (04/03/23 0813)  BP: 114/76 (04/03/23 0600)  SpO2: 97 % (04/03/23 0625)   Vital Signs (24h Range):  Temp:  [97.2 °F (36.2 °C)-98.3 °F (36.8 °C)] 98.3 °F (36.8 °C)  Pulse:  [101-112] 101  Resp:  [17-39] 27  SpO2:  [86 %-100 %] 97 %  BP: ()/(51-88) 114/76     Weight: 67 kg (147 lb 11.3 oz)  Body mass index is 22.46 kg/m².    Intake/Output Summary (Last 24 hours) at 4/3/2023 0823  Last data filed at 4/3/2023 0715  Gross per 24 hour   Intake 1998.86 ml   Output 1183 ml   Net 815.86 ml      Physical Exam  Constitutional:       General: He is not in acute distress.     Appearance: He is ill-appearing. He is not toxic-appearing or diaphoretic.   Cardiovascular:      Rate and Rhythm: Normal rate and regular rhythm.      Heart sounds: No murmur heard.    No gallop.   Pulmonary:      Effort: Pulmonary effort is normal. No respiratory distress.      Breath sounds: Normal breath sounds. No wheezing.   Abdominal:      General: Bowel sounds are normal. There is no distension.      Palpations: Abdomen is soft.      Tenderness: There is no abdominal tenderness.       Significant Labs: All pertinent labs within the past 24 hours have been reviewed.    Significant Imaging: I have reviewed all pertinent imaging results/findings within the past 24 hours.

## 2023-04-03 NOTE — PROGRESS NOTES
[facility] - [place] Intensive Care  Otorhinolaryngology-Head & Neck Surgery  Progress Note    Patient Name: Fareed Richard Jr.  MRN: [id number]  Code Status: Full Code  Admission Date: 3/18/2023  Hospital Length of Stay: 15 days  Attending Physician: Kelby Sargent MD  Primary Care Provider: Kodi Tubbs MD    Subjective:     Interval History: had bleeding from trach earlier today. Had also had some blood coughed up through mouth. Has cuff trach in place but cuff deflated. Has not had issues with coughing up blood/bleeding from trach since the time I initially saw him on 3-20    Post-Op Info:  Procedure(s) (LRB):  Angioplasty-coronary (Left)  Aortogram, Aortic Arch  AORTOGRAM, WITH SERIALOGRAPHY (N/A)  Stent, Coronary, Multi Vessel   7 Days Post-Op  Hospital Day: 17      Medications:  Continuous Infusions:   dextrose 5 % (D5W) 50 mL/hr at 04/03/23 1249    nitroGLYCERIN       Scheduled Meds:   aspirin  81 mg Oral Daily    atorvastatin  80 mg Per G Tube Daily    clopidogreL  75 mg Oral Daily    ferrous sulfate  1 tablet Per G Tube Daily    glycopyrrolate  1 mg Per G Tube TID    LIDOcaine  2 patch Transdermal Q24H    melatonin  9 mg Per G Tube Nightly    metoprolol tartrate  25 mg Per G Tube BID    pantoprazole  40 mg Intravenous Daily    sodium chloride 0.9%  10 mL Intravenous Q6H     PRN Meds:sodium chloride, acetaminophen, acetaminophen, acetaminophen, albuterol-ipratropium, atropine, bisacodyL, fentaNYL, guaiFENesin 100 mg/5 ml, hydrOXYzine HCL, insulin aspart U-100, morphine, naloxone, nitroGLYCERIN, nitroGLYCERIN, ondansetron, ondansetron, oxyCODONE, phenyleph-min oil-petrolatum, sodium chloride 0.9%, Flushing PICC Protocol **AND** sodium chloride 0.9% **AND** sodium chloride 0.9%     Objective:     Vital Signs (24h Range):  Temp:  [97.2 °F (36.2 °C)-98.3 °F (36.8 °C)] 97.6 °F (36.4 °C)  Pulse:  [] 101  Resp:  [18-42] 33  SpO2:  [86 %-100 %] 91 %  BP: ()/(50-79) 100/58     Date 04/03/23 0700 - 04/04/23  0659   Shift 1032-5714 4702-3350 0642-9465 24 Hour Total   INTAKE   P.O. 0   0   I.V.(mL/kg) 237.4(3.5)   237.4(3.5)   NG/   600   Shift Total(mL/kg) 837.4(12.5)   837.4(12.5)   OUTPUT   Urine(mL/kg/hr) 330   330   Drains 0   0   Shift Total(mL/kg) 330(4.9)   330(4.9)   Weight (kg) 67 67 67 67     Lines/Drains/Airways       Peripherally Inserted Central Catheter Line  Duration             PICC Triple Lumen 03/20/23 2145 right basilic 13 days              Drain  Duration                  Gastrostomy/Enterostomy 01/04/22 Percutaneous endoscopic gastrostomy (PEG)  days         Urethral Catheter 03/31/23 1905 Non-latex 16 Fr. 2 days              Airway  Duration             Adult Surgical Airway 03/16/23 1014 Shiley Uncuffed 6.0 18 days                    Physical Exam  Neck:      Comments: 6 shiley cuffed trach in place with cuff deflated. No skin breakdown. No active bleeding. Slight granulation tissue around trach tube and stoma- treated ith silver nitrate   Neurological:      Mental Status: He is alert.     PROCEDURE NOTE  NAME OF PROCEDURE: tracheoscopy through tracheostomy tube  INDICATIONS: gag reflex precludes mirror exam,hemoptysis  FINDINGS: no evidence of tracheo-inominate fistula. No evidence of suction trauma. No granulation tissue in trachea internally. no tears/laceration of tracheal wall.      Consent: After procedure was explained in detail and all questions answered, verbal consent was obtained for performing flexible laryngoscopy.  Anesthesia: topical 4% lidocaine and neosynephrine  Procedure: With patient in seated position, the scope was inserted into the tracheostomy tube to the level of the sherine and then backed out to mid trachea and tracheostomy tube was backed out over the scope to evaluate the entirety of the track of the trechea and evaluate for presence of soft tissue breakdown or potential tracheo inominate fistula. Once trach tube backed out completely to level of skin, trach  tube replaced over the scope and scope removed.      Assessment/Plan:     Active Diagnoses:    Diagnosis Date Noted POA    PRINCIPAL PROBLEM:  Acute blood loss anemia [D62] 02/16/2022 Yes    Retroperitoneal hematoma [K66.1] 03/28/2023 No    NSTEMI (non-ST elevated myocardial infarction) [I21.4] 03/25/2023 Yes    Anxiety [F41.9] 03/23/2023 Yes    Hypotension [I95.9] 03/19/2023 Yes    Hemoptysis [R04.2] 03/19/2023 Yes    UTI (urinary tract infection) [N39.0] 03/19/2023 Yes    PEG (percutaneous endoscopic gastrostomy) status [Z93.1] 09/23/2022 Not Applicable    Tracheostomy present [Z93.0] 09/23/2022 Not Applicable    Elevated brain natriuretic peptide (BNP) level [R79.89] 09/23/2022 Yes    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  [E03.2] 08/24/2022 Yes    ACP (advance care planning) [Z71.89] 04/28/2022 Not Applicable    Acute on chronic respiratory failure with hypoxia [J96.21] 03/14/2022 Yes    Coronary artery disease involving native coronary artery of native heart with unstable angina pectoris [I25.110] 12/29/2021 Yes    Other hyperlipidemia [E78.49] 12/29/2021 Yes    Primary hypertension [I10] 12/29/2021 Yes    Squamous cell cancer of tongue [C02.9] 12/16/2021 Yes      Problems Resolved During this Admission:     Silver nitrate applied to site of granulation tissue. Blood reportedly darker in color so do not think this would be source. Consider formal bronchoscopy as not t-I fistula or anything in trachea. If rebleeds inflate cuff, if continues to cough up blood this suggest pulm source but if comes out of mouth more with cuff inflated could be from oropharynx or nose. I think this is less likely since prior scope showed no tumor recurrence and has not had any bleeding from nose so would be odd to have posterior only nasal bleed    Gwen French MD  Otorhinolaryngology-Head & Neck Surgery  [facility] [place] Intensive Care

## 2023-04-03 NOTE — NURSING
[facility] - [place], [place] Intensive Care  ICU Shift Summary  Date: 4/3/2023      Prehospitalization: Home  Admit Date / LOS : 3/18/2023/ 15 days    Diagnosis: Acute blood loss anemia    Consults:        Active: Cardio and Pulm CC       Needed: N/A     Code Status: Full Code   Advanced Directive: Not Received    LDA:  Lines/Drains/Airways       Peripherally Inserted Central Catheter Line  Duration             PICC Triple Lumen 03/20/23 2145 right basilic 13 days              Drain  Duration                  Gastrostomy/Enterostomy 01/04/22 Percutaneous endoscopic gastrostomy (PEG)  days         Urethral Catheter 03/31/23 1905 Non-latex 16 Fr. 2 days              Airway  Duration             Adult Surgical Airway 03/16/23 1014 Shiley Uncuffed 6.0 17 days                  Central Lines/Site/Justification: PICC  Urinary Cath/Order/Justification:Urinary Retention    Vasopressors/Infusions:    dexmedeTOMIDine (Precedex) infusion (titrating) Stopped (03/24/23 1100)    nitroGLYCERIN            GOALS: Volume/ Hemodynamic: N/A                     RASS: N/A    Pain Management: IV       Pain Controlled: not applicable     Rhythm: ST    Respiratory Device: Trach  Oxygen Concentration (%):  [40] 40                 Most Recent SBT/ SAT: Does not meet criteria       MOVE Screen: PASS  ICU Liberation: not applicable    VTE Prophylaxis: Pharm  Mobility: Bedrest  Stress Ulcer Prophylaxis: Yes    Isolation: No active isolations    Dietary:   Current Diet Order   Procedures    Diet NPO Except for: Medication, Sips with Medication     Order Specific Question:   Except for     Answer:   Medication     Order Specific Question:   Except for     Answer:   Sips with Medication      Tolerance: yes  Advancement: @ goal    I & O (24h):    Intake/Output Summary (Last 24 hours) at 4/3/2023 0529  Last data filed at 4/3/2023 0526  Gross per 24 hour   Intake 1795.34 ml   Output 1103 ml   Net 692.34 ml        Restraints: No    Significant Dates:  Post Op  Date: N/A  Rescue Date: N/A  Imaging/ Diagnostics: N/A    Noteworthy Labs:  none    COVID Test: (--)  CBC/Anemia Labs: Coags:    Recent Labs   Lab 04/02/23  1434 04/03/23  0320   WBC 11.50 9.10  9.10   HGB 6.7* 10.1*  10.1*   HCT 21.4* 31.9*  31.9*    185  185   MCV 96 95  95   RDW 17.8* 17.8*  17.8*    Recent Labs   Lab 03/27/23  2143 03/28/23  0427 04/02/23  0322   INR 1.2  --  1.0   APTT 32.8* 26.5 23.8        Chemistries:   Recent Labs   Lab 03/31/23  0407 04/02/23  1434 04/03/23  0320    153* 152*   K 3.7 4.1 4.0    109 109   CO2 38* 34* 32*   BUN 47* 53* 46*   CREATININE 1.0 0.9 0.9   CALCIUM 8.8 9.0 8.7   PROT 5.5*  --  6.0   BILITOT 0.5  --  0.8   ALKPHOS 74  --  100   ALT 47*  --  28   AST 28  --  39        Cardiac Enzymes: Ejection Fractions:    No results for input(s): CPK, CPKMB, MB, TROPONINI in the last 72 hours. EF   Date Value Ref Range Status   03/20/2023 60 % Final        POCT Glucose: HbA1c:    Recent Labs   Lab 04/02/23  0841 04/02/23  1143 04/02/23 2053   POCTGLUCOSE 216* 124* 188*    Hemoglobin A1C   Date Value Ref Range Status   09/27/2022 6.3 (H) 4.0 - 5.6 % Final     Comment:     ADA Screening Guidelines:  5.7-6.4%  Consistent with prediabetes  >or=6.5%  Consistent with diabetes    High levels of fetal hemoglobin interfere with the HbA1C  assay. Heterozygous hemoglobin variants (HbS, HgC, etc)do  not significantly interfere with this assay.   However, presence of multiple variants may affect accuracy.             ICU LOS 14d 16h  Level of Care: Critical Care    Chart Check: 12 HR Done  Shift Summary/Plan for the shift: none

## 2023-04-03 NOTE — ASSESSMENT & PLAN NOTE
Continue medications via PEG. Does not take oral intake.   -On isosource 1.5 6 days daily with 120cc free water flushes via wife  -Will continue home tube feedings, with nutrition consulted

## 2023-04-03 NOTE — PLAN OF CARE
Recommendations     1. Continue with TF recs; Monitor diet advancements.   2. Advance diet as tolerated and appropriate.   3. Monitor weight changes; check weekly weights.  4. Recommend Sebas to promote wound healing at diet advancement  5. Recommend Impact 1.5 (5 cans daily) to provide 1875 kcal, 118g protein and 965 mL water to meet EEN.      Goals: 1. Diet advancement by RD follow up.  Nutrition Goal Status: goal not met  Communication of RD Recs: other (comment) (POC)     Assessment and Plan     Nutrition Problem  Inadequate PO intake     Related to (etiology):   Cancer     Signs and Symptoms (as evidenced by):   Tube feed as a means of nutrition        Interventions/Recommendations (treatment strategy):  Collaboration with medical providers  TF recommendations      Nutrition Diagnosis Status:   Continues

## 2023-04-03 NOTE — PLAN OF CARE
Patient arrived from:  home  Help at home:  Wife is patient's primary caregiver  Barriers to DC:  Remains in the ICU, not ready for DC  Discharge plan at this time:  PT/OT recommend HH.  Wife agrees.    CM will continue to follow and finalize plans        04/03/23 6197   Discharge Reassessment   Assessment Type Discharge Planning Reassessment   Did the patient's condition or plan change since previous assessment? No   Discharge Plan discussed with:   (Patient discussed in MDT rounds this am)   Why the patient remains in the hospital Requires continued medical care

## 2023-04-03 NOTE — PROGRESS NOTES
"[facility]ensive [facility]  Adult Nutrition  Progress Note    SUMMARY       Recommendations    1. Continue with TF recs; Monitor diet advancements.   2. Advance diet as tolerated and appropriate.   3. Monitor weight changes; check weekly weights.  4. Recommend Sebas to promote wound healing at diet advancement  5. Recommend Impact 1.5 (5 cans daily) to provide 1875 kcal, 118g protein and 965 mL water to meet EEN.     Goals: 1. Diet advancement by RD follow up.  Nutrition Goal Status: goal not met  Communication of RD Recs: other (comment) (POC)    Assessment and Plan    Nutrition Problem  Inadequate PO intake     Related to (etiology):   Cancer     Signs and Symptoms (as evidenced by):   Tube feed as a means of nutrition        Interventions/Recommendations (treatment strategy):  Collaboration with medical providers  TF recommendations      Nutrition Diagnosis Status:   Continues    Reason for Assessment    Reason For Assessment: RD follow-up  Diagnosis:  (acute blood loss anemia)  Relevant Medical History: cancer, COPD, hyperlipidemia  Interdisciplinary Rounds: did not attend  General Information Comments: RD follow up. Pt currently NPO. Pt tolerated previous diet advancement.  Pt has no recent significant weight changes. RD to continue to monitor PO intake at diet advancement. Continue to monitor weight changes. Wounds noted. Please consult if TPN/PPN recs needed.  Nutrition Discharge Planning: Pending medical course    Nutrition Risk Screen    Nutrition Risk Screen: tube feeding or parenteral nutrition    Nutrition/Diet History    Spiritual, Cultural Beliefs, Protestant Practices, Values that Affect Care: no  Food Allergies: NKFA    Anthropometrics    Temp: 97.6 °F (36.4 °C)  Height Method: Stated  Height: 5' 8" (172.7 cm)  Height (inches): 68 in  Weight Method: Bed Scale  Weight: 67 kg (147 lb 11.3 oz)  Weight (lb): 147.71 lb  Ideal Body Weight (IBW), Male: 154 lb  % Ideal Body Weight, Male (lb): 97.4 %  BMI " (Calculated): 22.5  BMI Grade: 18.5-24.9 - normal       Lab/Procedures/Meds    Pertinent Labs Reviewed: reviewed  Pertinent Labs Comments: k 3.4, chloride 93, co2 41, anion gap 7, BUN 38, Glu 139, ca 8.6  Pertinent Medications Reviewed: reviewed  Current Outpatient Medications   Medication Instructions    amLODIPine (NORVASC) 10 mg, Oral    atorvastatin (LIPITOR) 20 mg, Per G Tube, Daily    bisacodyL (DULCOLAX) 10 mg, Rectal, Daily PRN    clopidogreL (PLAVIX) 75 mg, Oral, Daily    glycopyrrolate (CUVPOSA) 1 mg/5 mL (0.2 mg/mL) Soln Take 5 mLs (1 mg total) by mouth 3 (three) times daily as needed (TO REDUCE SECRETIONS).    guaiFENesin 400 mg, Oral, Every 4 hours PRN    hydrOXYzine HCL (ATARAX) 25 MG tablet SMARTSI.5 Tablet(s) Gastro Tube Every 8 Hours PRN    melatonin (MELATIN) 6 mg, Per G Tube, Nightly    metoprolol succinate (TOPROL-XL) 200 mg, Oral, 2 times daily    multivit-min/FA/lycopen/lutein (CENTRUM SILVER MEN ORAL) Oral    omeprazole (PRILOSEC) 40 MG capsule Take 40 mg (contents of one capsule) twice daily through PEG tube. Mix contents of capsule with 10 mL applesauce.    oxyCODONE (ROXICODONE) 5 mg, Per G Tube, Every 4 hours PRN    polyethylene glycol (GLYCOLAX) 17 g, Per G Tube, 2 times daily    sodium chloride (OCEAN) 0.65 % nasal spray 1 spray, Nasal, As needed (PRN)    sodium chloride 3% 3 % nebulizer solution 4 mLs, Nebulization, 2 times daily    WIXELA INHUB 250-50 mcg/dose diskus inhaler SMARTSI Puff(s) By Mouth Every 12 Hours        Estimated/Assessed Needs    Weight Used For Calorie Calculations: 68 kg (150 lb)  Energy Calorie Requirements (kcal): 1533 kcal  Energy Need Method: [place] Epifanioor (x 1.1)  Protein Requirements: 68 g (1 g/kg)  Weight Used For Protein Calculations: 68 kg (150 lb)        RDA Method (mL): 1533         Nutrition Prescription Ordered    Current Diet Order: tube feed    Evaluation of Received Nutrient/Fluid Intake    I/O: +4.1 L  Energy Calories Required: meeting  needs  Protein Required: meeting needs  Fluid Required: meeting needs  Comments: lbm 3/18/23  Tolerance: tolerating  % Intake of Estimated Energy Needs: 0 - 25 %  % Meal Intake: NPO    Nutrition Risk    Level of Risk/Frequency of Follow-up: low     Monitor and Evaluation    Food and Nutrient Intake: energy intake, food and beverage intake, enteral nutrition intake, parenteral nutrition intake  Food and Nutrient Adminstration: diet order, enteral and parenteral nutrition administration  Knowledge/Beliefs/Attitudes: food and nutrition knowledge/skill, beliefs and attitudes  Physical Activity and Function: nutrition-related ADLs and IADLs, factors affecting access to physical activity  Anthropometric Measurements: weight, weight change, body mass index  Biochemical Data, Medical Tests and Procedures: electrolyte and renal panel, gastrointestinal profile, glucose/endocrine profile, inflammatory profile, lipid profile  Nutrition-Focused Physical Findings: overall appearance     Nutrition Follow-Up    RD Follow-up?: Yes    Ambar Dutta, Registration Eligible, Provisional LDN

## 2023-04-03 NOTE — NURSING
Attempted to give patient tube feeds for X2 today. Patient only allowed me to give a partial feed this morning. Patient with multiple BM's today and every time RN attempted to clean patient he would get upset because he didn't want to move or be cleaned. Patient made aware that his rectal area is getting red and excoriated from BM and that we need to clean him and add protective ointment as soon as he uses the bathroom and to please inform RN.  Patient also insisted on turning himself, when RN would place pillow behind his back or butt, he would pull them out.

## 2023-04-03 NOTE — PLAN OF CARE
Trach in place secured with collar; suctioned as needed. AO X 4. PICC line. Peg tube secured and patent. Lainez in place and secure. 1 unit of PRBC given; tolerated appropriately. Dark stools present. Care explained to pt. Free from falls.

## 2023-04-03 NOTE — NURSING
[facility]  Nurses Note -- 4 Eyes      4/2/2023       Skin assessed on: Q Shift      [x] No Pressure Injuries Present    [x]Prevention Measures Documented    [] Yes LDA  for Pressure Injury Previously documented     [] Yes New Pressure Injury Discovered   [] LDA for New Pressure Injury Added      Attending RN:  JLUIS BURR RN     Second RN:  Paras RN

## 2023-04-04 LAB
ABO + RH BLD: NORMAL
ANION GAP SERPL CALC-SCNC: 9 MMOL/L (ref 8–16)
BASOPHILS # BLD AUTO: 0.01 K/UL (ref 0–0.2)
BASOPHILS NFR BLD: 0.1 % (ref 0–1.9)
BLD GP AB SCN CELLS X3 SERPL QL: NORMAL
BLD PROD TYP BPU: NORMAL
BLOOD UNIT EXPIRATION DATE: NORMAL
BLOOD UNIT TYPE CODE: 5100
BLOOD UNIT TYPE: NORMAL
BUN SERPL-MCNC: 34 MG/DL (ref 8–23)
CALCIUM SERPL-MCNC: 8.6 MG/DL (ref 8.7–10.5)
CHLORIDE SERPL-SCNC: 107 MMOL/L (ref 95–110)
CO2 SERPL-SCNC: 32 MMOL/L (ref 23–29)
CODING SYSTEM: NORMAL
CREAT SERPL-MCNC: 0.9 MG/DL (ref 0.5–1.4)
CROSSMATCH INTERPRETATION: NORMAL
DIFFERENTIAL METHOD: ABNORMAL
DISPENSE STATUS: NORMAL
EOSINOPHIL # BLD AUTO: 0.2 K/UL (ref 0–0.5)
EOSINOPHIL NFR BLD: 2.1 % (ref 0–8)
ERYTHROCYTE [DISTWIDTH] IN BLOOD BY AUTOMATED COUNT: 18.1 % (ref 11.5–14.5)
EST. GFR  (NO RACE VARIABLE): >60 ML/MIN/1.73 M^2
GLUCOSE SERPL-MCNC: 129 MG/DL (ref 70–110)
HCT VFR BLD AUTO: 25.4 % (ref 40–54)
HGB BLD-MCNC: 7.6 G/DL (ref 14–18)
IMM GRANULOCYTES # BLD AUTO: 0.04 K/UL (ref 0–0.04)
IMM GRANULOCYTES NFR BLD AUTO: 0.6 % (ref 0–0.5)
LYMPHOCYTES # BLD AUTO: 0.4 K/UL (ref 1–4.8)
LYMPHOCYTES NFR BLD: 4.9 % (ref 18–48)
MCH RBC QN AUTO: 28.9 PG (ref 27–31)
MCHC RBC AUTO-ENTMCNC: 29.9 G/DL (ref 32–36)
MCV RBC AUTO: 97 FL (ref 82–98)
MONOCYTES # BLD AUTO: 0.8 K/UL (ref 0.3–1)
MONOCYTES NFR BLD: 10.8 % (ref 4–15)
NEUTROPHILS # BLD AUTO: 5.8 K/UL (ref 1.8–7.7)
NEUTROPHILS NFR BLD: 81.5 % (ref 38–73)
NRBC BLD-RTO: 0 /100 WBC
PLATELET # BLD AUTO: 166 K/UL (ref 150–450)
PMV BLD AUTO: 9.4 FL (ref 9.2–12.9)
POCT GLUCOSE: 100 MG/DL (ref 70–110)
POCT GLUCOSE: 215 MG/DL (ref 70–110)
POTASSIUM SERPL-SCNC: 3.7 MMOL/L (ref 3.5–5.1)
RBC # BLD AUTO: 2.63 M/UL (ref 4.6–6.2)
SODIUM SERPL-SCNC: 148 MMOL/L (ref 136–145)
SPECIMEN OUTDATE: NORMAL
TRANS ERYTHROCYTES VOL PATIENT: NORMAL ML
WBC # BLD AUTO: 7.14 K/UL (ref 3.9–12.7)

## 2023-04-04 PROCEDURE — 25000003 PHARM REV CODE 250: Performed by: REGISTERED NURSE

## 2023-04-04 PROCEDURE — 85025 COMPLETE CBC W/AUTO DIFF WBC: CPT | Performed by: HOSPITALIST

## 2023-04-04 PROCEDURE — 99900026 HC AIRWAY MAINTENANCE (STAT)

## 2023-04-04 PROCEDURE — 25000003 PHARM REV CODE 250: Performed by: INTERNAL MEDICINE

## 2023-04-04 PROCEDURE — 94640 AIRWAY INHALATION TREATMENT: CPT

## 2023-04-04 PROCEDURE — 36430 TRANSFUSION BLD/BLD COMPNT: CPT

## 2023-04-04 PROCEDURE — 97535 SELF CARE MNGMENT TRAINING: CPT

## 2023-04-04 PROCEDURE — P9021 RED BLOOD CELLS UNIT: HCPCS | Performed by: STUDENT IN AN ORGANIZED HEALTH CARE EDUCATION/TRAINING PROGRAM

## 2023-04-04 PROCEDURE — 99900035 HC TECH TIME PER 15 MIN (STAT)

## 2023-04-04 PROCEDURE — 25000242 PHARM REV CODE 250 ALT 637 W/ HCPCS: Performed by: STUDENT IN AN ORGANIZED HEALTH CARE EDUCATION/TRAINING PROGRAM

## 2023-04-04 PROCEDURE — 99233 SBSQ HOSP IP/OBS HIGH 50: CPT | Mod: ,,, | Performed by: REGISTERED NURSE

## 2023-04-04 PROCEDURE — 86920 COMPATIBILITY TEST SPIN: CPT | Performed by: STUDENT IN AN ORGANIZED HEALTH CARE EDUCATION/TRAINING PROGRAM

## 2023-04-04 PROCEDURE — 25000242 PHARM REV CODE 250 ALT 637 W/ HCPCS: Performed by: HOSPITALIST

## 2023-04-04 PROCEDURE — 94761 N-INVAS EAR/PLS OXIMETRY MLT: CPT

## 2023-04-04 PROCEDURE — 27100171 HC OXYGEN HIGH FLOW UP TO 24 HOURS

## 2023-04-04 PROCEDURE — 99498 ADVNCD CARE PLAN ADDL 30 MIN: CPT | Mod: ,,, | Performed by: REGISTERED NURSE

## 2023-04-04 PROCEDURE — 25000003 PHARM REV CODE 250: Performed by: STUDENT IN AN ORGANIZED HEALTH CARE EDUCATION/TRAINING PROGRAM

## 2023-04-04 PROCEDURE — 99497 PR ADVNCD CARE PLAN 30 MIN: ICD-10-PCS | Mod: ,,, | Performed by: REGISTERED NURSE

## 2023-04-04 PROCEDURE — 99497 ADVNCD CARE PLAN 30 MIN: CPT | Mod: ,,, | Performed by: REGISTERED NURSE

## 2023-04-04 PROCEDURE — C9113 INJ PANTOPRAZOLE SODIUM, VIA: HCPCS | Performed by: STUDENT IN AN ORGANIZED HEALTH CARE EDUCATION/TRAINING PROGRAM

## 2023-04-04 PROCEDURE — 80048 BASIC METABOLIC PNL TOTAL CA: CPT | Performed by: STUDENT IN AN ORGANIZED HEALTH CARE EDUCATION/TRAINING PROGRAM

## 2023-04-04 PROCEDURE — 25000003 PHARM REV CODE 250: Performed by: HOSPITALIST

## 2023-04-04 PROCEDURE — 99498 PR ADVNCD CARE PLAN ADDL 30 MIN: ICD-10-PCS | Mod: ,,, | Performed by: REGISTERED NURSE

## 2023-04-04 PROCEDURE — A4216 STERILE WATER/SALINE, 10 ML: HCPCS | Performed by: INTERNAL MEDICINE

## 2023-04-04 PROCEDURE — 63600175 PHARM REV CODE 636 W HCPCS: Performed by: STUDENT IN AN ORGANIZED HEALTH CARE EDUCATION/TRAINING PROGRAM

## 2023-04-04 PROCEDURE — 20000000 HC ICU ROOM

## 2023-04-04 PROCEDURE — 99233 PR SUBSEQUENT HOSPITAL CARE,LEVL III: ICD-10-PCS | Mod: ,,, | Performed by: REGISTERED NURSE

## 2023-04-04 PROCEDURE — 63600175 PHARM REV CODE 636 W HCPCS: Performed by: INTERNAL MEDICINE

## 2023-04-04 PROCEDURE — 31720 CLEARANCE OF AIRWAYS: CPT

## 2023-04-04 PROCEDURE — 97530 THERAPEUTIC ACTIVITIES: CPT

## 2023-04-04 PROCEDURE — 86900 BLOOD TYPING SEROLOGIC ABO: CPT | Performed by: STUDENT IN AN ORGANIZED HEALTH CARE EDUCATION/TRAINING PROGRAM

## 2023-04-04 RX ORDER — IPRATROPIUM BROMIDE AND ALBUTEROL SULFATE 2.5; .5 MG/3ML; MG/3ML
3 SOLUTION RESPIRATORY (INHALATION) EVERY 12 HOURS
Status: DISCONTINUED | OUTPATIENT
Start: 2023-04-04 | End: 2023-04-07

## 2023-04-04 RX ORDER — HYDROCODONE BITARTRATE AND ACETAMINOPHEN 500; 5 MG/1; MG/1
TABLET ORAL
Status: DISCONTINUED | OUTPATIENT
Start: 2023-04-04 | End: 2023-04-10

## 2023-04-04 RX ORDER — IPRATROPIUM BROMIDE AND ALBUTEROL SULFATE 2.5; .5 MG/3ML; MG/3ML
3 SOLUTION RESPIRATORY (INHALATION) 2 TIMES DAILY
Status: CANCELLED | OUTPATIENT
Start: 2023-04-04

## 2023-04-04 RX ADMIN — Medication 10 ML: at 05:04

## 2023-04-04 RX ADMIN — IPRATROPIUM BROMIDE AND ALBUTEROL SULFATE 3 ML: 2.5; .5 SOLUTION RESPIRATORY (INHALATION) at 04:04

## 2023-04-04 RX ADMIN — ASPIRIN 81 MG CHEWABLE TABLET 81 MG: 81 TABLET CHEWABLE at 08:04

## 2023-04-04 RX ADMIN — ATORVASTATIN CALCIUM 80 MG: 40 TABLET, FILM COATED ORAL at 08:04

## 2023-04-04 RX ADMIN — HYDROXYZINE HYDROCHLORIDE 25 MG: 25 TABLET ORAL at 09:04

## 2023-04-04 RX ADMIN — CLOPIDOGREL BISULFATE 75 MG: 75 TABLET ORAL at 08:04

## 2023-04-04 RX ADMIN — Medication 10 ML: at 12:04

## 2023-04-04 RX ADMIN — PANTOPRAZOLE SODIUM 40 MG: 40 INJECTION, POWDER, FOR SOLUTION INTRAVENOUS at 08:04

## 2023-04-04 RX ADMIN — HYDROXYZINE HYDROCHLORIDE 25 MG: 25 TABLET ORAL at 02:04

## 2023-04-04 RX ADMIN — FERROUS SULFATE TAB 325 MG (65 MG ELEMENTAL FE) 1 EACH: 325 (65 FE) TAB at 08:04

## 2023-04-04 RX ADMIN — METOPROLOL TARTRATE 25 MG: 25 TABLET, FILM COATED ORAL at 09:04

## 2023-04-04 RX ADMIN — IPRATROPIUM BROMIDE AND ALBUTEROL SULFATE 3 ML: 2.5; .5 SOLUTION RESPIRATORY (INHALATION) at 07:04

## 2023-04-04 RX ADMIN — MELATONIN TAB 3 MG 9 MG: 3 TAB at 09:04

## 2023-04-04 RX ADMIN — LIDOCAINE 5% 2 PATCH: 700 PATCH TOPICAL at 12:04

## 2023-04-04 RX ADMIN — GLYCOPYRROLATE 1 MG: 1 TABLET ORAL at 02:04

## 2023-04-04 RX ADMIN — GLYCOPYRROLATE 1 MG: 1 TABLET ORAL at 08:04

## 2023-04-04 RX ADMIN — IPRATROPIUM BROMIDE AND ALBUTEROL SULFATE 3 ML: 2.5; .5 SOLUTION RESPIRATORY (INHALATION) at 08:04

## 2023-04-04 RX ADMIN — OXYCODONE HYDROCHLORIDE 5 MG: 5 TABLET ORAL at 09:04

## 2023-04-04 RX ADMIN — GLYCOPYRROLATE 1 MG: 1 TABLET ORAL at 09:04

## 2023-04-04 RX ADMIN — INSULIN ASPART 2 UNITS: 100 INJECTION, SOLUTION INTRAVENOUS; SUBCUTANEOUS at 01:04

## 2023-04-04 RX ADMIN — FENTANYL CITRATE 50 MCG: 50 INJECTION INTRAMUSCULAR; INTRAVENOUS at 09:04

## 2023-04-04 NOTE — PLAN OF CARE
Problem: Adult Inpatient Plan of Care  Goal: Plan of Care Review  Outcome: Ongoing, Progressing  Goal: Patient-Specific Goal (Individualized)  Outcome: Ongoing, Progressing  Goal: Absence of Hospital-Acquired Illness or Injury  Outcome: Ongoing, Progressing  Goal: Optimal Comfort and Wellbeing  Outcome: Ongoing, Progressing  Goal: Readiness for Transition of Care  Outcome: Ongoing, Progressing     Problem: Fall Injury Risk  Goal: Absence of Fall and Fall-Related Injury  Outcome: Ongoing, Progressing     Problem: Skin Injury Risk Increased  Goal: Skin Health and Integrity  Outcome: Ongoing, Progressing     Problem: Infection  Goal: Absence of Infection Signs and Symptoms  Outcome: Ongoing, Progressing     Problem: Coping Ineffective  Goal: Effective Coping  Outcome: Ongoing, Progressing     Problem: Impaired Wound Healing  Goal: Optimal Wound Healing  Outcome: Ongoing, Progressing

## 2023-04-04 NOTE — ASSESSMENT & PLAN NOTE
- continued hypotension through out admit; no longer require pressor support  - has had difficulty with participating with PT due to this; both hypotension and physical decline continues to be barrier to discharge   - has been anemic due to episodes of bleeding; has required multiple transfusions throughout admit   - noted; management per hospital primary

## 2023-04-04 NOTE — NURSING
Patient sitting up in chair in his own feces refusing to allow RN to clean him. After multiple attempts of asking him, supervisor informed

## 2023-04-04 NOTE — PT/OT/SLP PROGRESS
Occupational Therapy   Treatment    Name: Fareed Richard Jr.  MRN: [id number]  Admitting Diagnosis:  Acute blood loss anemia  8 Days Post-Op    Recommendations:     Discharge Recommendations:  (OT at next level of care; if d/c home, HHOT w/ supervision)  Discharge Equipment Recommendations:  none  Barriers to discharge:   (If d/c home, pt will required continues care/assistance due to weakness and risk of falls. Pt was mod I w/ ADLs and functional mobility.)    Assessment:     Fareed Richard Jr. is a 73 y.o. male with a medical diagnosis of Acute blood loss anemia.   Performance deficits affecting function are weakness, impaired endurance, impaired self care skills, impaired functional mobility, gait instability, impaired balance, decreased upper extremity function, decreased lower extremity function, decreased coordination, decreased safety awareness, impaired cardiopulmonary response to activity.     Pt adamant about using BS this date, requiring min-mod A x 2 persons and HHA (w/ nurse) to transfer to [facility]. Pt moderately anxious and SOB, SPO2 82-89% with exertion. Pt was able to statically stand ~1.5 min for rear inez-care to then step transfer to chair w/ close min A and use of RW. Pt required rest breaks and PLB for preventing over-exertion. Pt will continue to benefit from skilled acute OT services to maximize functional capacity for safe performance w/ ADLs and functional mobility.     Rehab Prognosis:  Fair; patient would benefit from acute skilled OT services to address these deficits and reach maximum level of function.       Plan:     Patient to be seen 3 x/week, 5 x/week to address the above listed problems via self-care/home management, therapeutic activities, therapeutic exercises  Plan of Care Expires: 04/12/23  Plan of Care Reviewed with: patient    Subjective     Chief Complaint: Generally anxious   Patient/Family Comments/goals: Pointing to [facility]   Pain/Comfort:  Pain Rating 1: 0/10  Pain Rating  Post-Intervention 1: 0/10    Objective:     Communicated with: Nurse Zunilda and ellie nurse prior to session.  Patient found HOB elevated with telemetry, peripheral IV, pulse ox (continuous), blood pressure cuff (trach collar) upon OT entry to room.    General Precautions: Standard, fall, respiratory    Orthopedic Precautions:N/A  Braces: N/A  Respiratory Status:  10L, 40% w/ Trach Collar     Occupational Performance:     Bed Mobility:    Patient completed Scooting/Bridging with contact guard assistance and minimum assistance  Patient completed Supine to Sit with minimum assistance     Functional Mobility/Transfers:  Patient completed Sit <> Stand Transfer x 1 trial from EOB and x 1 trial from chiar with minimum assistance and of 2 persons  with  hand-held assist   Patient completed Bed > BSC Transfer using Step Transfer technique with minimum assistance, moderate assistance, and of 2 persons with hand-held assist  Patient completed BSC > Chair Step Transfer technique with minimum assistance and of 1 persons with rolling walker    Activities of Daily Living:  Upper Body Dressing: minimum assistance for donning gown over back   Lower Body Dressing: total assistance for doffing gown in standing to renee new one in sitting w/ BUE support on RW  Toileting: total assistance for donning brief in standing w/ BUE support on RW in standing     AMPAC 6 Click ADL: 17    Treatment & Education:  -Pt performed ALD and functional mobility as noted above.   -Pt educated on safe functional mobility.   -Pt educated on importance of PLB and energy conservation strategies.   -Questions and concerns addressed within scope.     Patient left up in chair with all lines intact and call button in reach    GOALS:   Multidisciplinary Problems       Occupational Therapy Goals          Problem: Occupational Therapy    Goal Priority Disciplines Outcome Interventions   Occupational Therapy Goal     OT, PT/OT Ongoing, Progressing    Description:  Goals to be met by: 4/12/23     Patient will increase functional independence with ADLs by performing:    UE Dressing with Modified Nelson and Supervision.  LE Dressing with Modified Nelson and Supervision.  Grooming while standing at sink with Stand-by Assistance and Assistive Devices as needed.  Toileting from toilet with Modified Nelson, Supervision, and Assistive Devices as needed for hygiene and clothing management.   Sitting at edge of bed x30 minutes with Modified Nelson and Supervision.  Rolling to Bilateral with Modified Nelson.   Supine to sit with Modified Nelson.  Step transfer with Modified Nelson and Supervision  Toilet transfer to toilet with Supervision.  Upper extremity exercise program x10 reps per handout, with assistance as needed.                         Time Tracking:     OT Date of Treatment: 04/04/23  OT Start Time: 1030  OT Stop Time: 1053  OT Total Time (min): 23 min    Billable Minutes:Self Care/Home Management 13  Therapeutic Activity 10  Total Time 23    OT/NELLY: OT          4/4/2023

## 2023-04-04 NOTE — ASSESSMENT & PLAN NOTE
- Echo showed EF 60%, inferobasal hypokinesis, indeterminate diastolic function  - nitro drip weaned; pt denying any continued angina  - cardiac cath procedure on 3/23 and 3/27 with stent placements ; per cardiology poor CABG candidate   - continued concern for pt's tolerance/bleeding risk, but anticoagulation necessary give recent stent placements; cardiology following   - noted; management per hospital primary, and Cardiology    No Yes

## 2023-04-04 NOTE — ASSESSMENT & PLAN NOTE
- multiple bleeding events since admit; trach hemoptysis on admit, and another episode during admit; retroperitoneal hematoma 3/28  - pt now with black stools; wife requesting GI evaluation as pt had similar dark stools 9/22 and refused 2nd bowel prep after failed to clear for first bowel prep for scope while inpatient (discussed with Dr. Zhao)   - noted; management per hospital primary

## 2023-04-04 NOTE — NURSING
[facility]  Nurses Note -- 4 Eyes      4/7/2023       Skin assessed on: Q Shift      [x] No Pressure Injuries Present    [x]Prevention Measures Documented    [] Yes LDA  for Pressure Injury Previously documented     [] Yes New Pressure Injury Discovered   [] LDA for New Pressure Injury Added      Attending RN:  Zunilda Leavitt RN     Second RN:  Lyn Mendez RN

## 2023-04-04 NOTE — PLAN OF CARE
Problem: Occupational Therapy  Goal: Occupational Therapy Goal  Description: Goals to be met by: 4/12/23     Patient will increase functional independence with ADLs by performing:    UE Dressing with Modified Grand and Supervision.  LE Dressing with Modified Grand and Supervision.  Grooming while standing at sink with Stand-by Assistance and Assistive Devices as needed.  Toileting from toilet with Modified Grand, Supervision, and Assistive Devices as needed for hygiene and clothing management.   Sitting at edge of bed x30 minutes with Modified Grand and Supervision.  Rolling to Bilateral with Modified Grand.   Supine to sit with Modified Grand.  Step transfer with Modified Grand and Supervision  Toilet transfer to toilet with Supervision.  Upper extremity exercise program x10 reps per handout, with assistance as needed.    Outcome: Ongoing, Progressing

## 2023-04-04 NOTE — PT/OT/SLP PROGRESS
Physical Therapy      Patient Name:  Fareed Richard Jr.   MRN:  [id number]    Patient not seen today secondary to Patient ill (Comment), Increased agitation (Per Nurse Savi, Pt vomited earlier and is easily agitated with nursing care.  PT treatment deferred as pt is resting comfortably now). Will follow-up   Pt is OK for OOB to chair and BSC throughout the day with nursing staff.

## 2023-04-04 NOTE — SUBJECTIVE & OBJECTIVE
Past Medical History:   Diagnosis Date    Cancer     skin to Rt forearm and face    COPD (chronic obstructive pulmonary disease)     Hyperlipidemia     Hypertension     Pseudoaneurysm      Past Surgical History:   Procedure Laterality Date    ABDOMINAL SURGERY      stents placed in liver and large intestines, per patient    ANGIOGRAPHY OF AORTIC ARCH  3/27/2023    Procedure: Aortogram, Aortic Arch;  Surgeon: Tim Bhatt MD;  Location: [facility] CATH LAB;  Service: Cardiology;;    AORTOGRAPHY WITH SERIALOGRAPHY N/A 3/27/2023    Procedure: AORTOGRAM, WITH SERIALOGRAPHY;  Surgeon: Tim Bhatt MD;  Location: [facility] CATH LAB;  Service: Cardiology;  Laterality: N/A;    CAROTID STENT      COLONOSCOPY N/A 09/27/2022    Procedure: COLONOSCOPY;  Surgeon: Donnie Peterson MD;  [facility]  Service: Endoscopy;  Laterality: N/A;    COLONOSCOPY N/A 09/30/2022    Procedure: COLONOSCOPY;  Surgeon: Joy Cabrera MD;  [facility]  Service: Endoscopy;  Laterality: N/A;    CORONARY STENT PLACEMENT  01/2000    DISSECTION OF NECK Bilateral 01/04/2022    Procedure: DISSECTION, NECK;  Surgeon: Naeem Smalls MD;  [facility];  Service: ENT;  Laterality: Bilateral;    ESOPHAGOGASTRODUODENOSCOPY N/A 09/27/2022    Procedure: EGD (ESOPHAGOGASTRODUODENOSCOPY);  Surgeon: Donnie Peterson MD;  [facility]  Service: Endoscopy;  Laterality: N/A;    ESOPHAGOGASTRODUODENOSCOPY N/A 09/30/2022    Procedure: EGD (ESOPHAGOGASTRODUODENOSCOPY);  Surgeon: Joy Cabrera MD;  Location: [facility] ENDO (2ND FLR);  Service: Endoscopy;  Laterality: N/A;    ESOPHAGOGASTRODUODENOSCOPY W/ PEG N/A 01/04/2022    Procedure: EGD, WITH PEG TUBE INSERTION;  Surgeon: Anthony Calabrese MD;  [facility];  Service: General;  Laterality: N/A;    EYE SURGERY      Cataract bilateral    femoral stents      bilateral    FLAP PROCEDURE N/A 01/03/2022    Procedure: CREATION, FREE FLAP;   Surgeon: Naeem Smalls MD;  Location: [facility] OR [facility] FLR;  Service: ENT;  Laterality: N/A;    FLAP PROCEDURE Right 01/04/2022    Procedure: CREATION, FREE FLAP;  Surgeon: Stacey Conde MD;  Location: [facility] OR 2ND FLR;  Service: ENT;  Laterality: Right;  Ischemic start 1203  Ischemic stop 1353    GLOSSECTOMY N/A 01/04/2022    Procedure: GLOSSECTOMY;  Surgeon: Naeem Smalls MD;  Location: [facility] OR 2ND FLR;  Service: ENT;  Laterality: N/A;    LEFT HEART CATHETERIZATION Left 3/23/2023    Procedure: Left heart cath;  Surgeon: Tacos Benitez MD;  Location: [facility] CATH LAB;  Service: Cardiology;  Laterality: Left;    PERCUTANEOUS TRANSLUMINAL BALLOON ANGIOPLASTY OF CORONARY ARTERY Left 3/27/2023    Procedure: Angioplasty-coronary;  Surgeon: Tim Bhatt MD;  Location: [facility] CATH LAB;  Service: Cardiology;  Laterality: Left;  not before 9am, R rad access, 6 Fr EBU 3.5 guide    RADICAL NECK DISSECTION Left 01/03/2022    Procedure: DISSECTION, NECK, RADICAL;  Surgeon: Naeem Smalls MD;  [facility];  Service: ENT;  Laterality: Left;    SKIN BIOPSY      SKIN CANCER EXCISION      STENT, CORONARY, MULTI VESSEL  3/27/2023    Procedure: Stent, Coronary, Multi Vessel;  Surgeon: Tim Bhatt MD;  Location: [facility] CATH LAB;  Service: Cardiology;;    TRACHEOTOMY N/A 01/04/2022    Procedure: TRACHEOTOMY;  Surgeon: Naeem Smalls MD;  [facility];  Service: ENT;  Laterality: N/A;    VASCULAR SURGERY       Review of patient's allergies indicates:   Allergen Reactions    Ativan [lorazepam] Anxiety       Medications:  Continuous Infusions:   nitroGLYCERIN       Scheduled Meds:   albuterol-ipratropium  3 mL Nebulization Q12H    aspirin  81 mg Oral Daily    atorvastatin  80 mg Per G Tube Daily    clopidogreL  75 mg Oral Daily    ferrous sulfate  1 tablet Per G Tube Daily    glycopyrrolate  1 mg Per G Tube TID    LIDOcaine  2 patch Transdermal Q24H    melatonin  9 mg Per G Tube  Nightly    metoprolol tartrate  25 mg Per G Tube BID    pantoprazole  40 mg Intravenous Daily    sodium chloride 0.9%  10 mL Intravenous Q6H     PRN Meds:sodium chloride, sodium chloride, acetaminophen, acetaminophen, acetaminophen, atropine, bisacodyL, fentaNYL, guaiFENesin 100 mg/5 ml, hydrOXYzine HCL, insulin aspart U-100, morphine, naloxone, nitroGLYCERIN, nitroGLYCERIN, ondansetron, ondansetron, oxyCODONE, phenyleph-min oil-petrolatum, sodium chloride 0.9%, Flushing PICC Protocol **AND** sodium chloride 0.9% **AND** sodium chloride 0.9%    Family History    None       Tobacco Use    Smoking status: Former     Packs/day: 2.00     Years: 40.00     Pack years: 80.00     Types: Cigarettes     Start date: 1963     Quit date: 2018     Years since quittin.9    Smokeless tobacco: Never    Tobacco comments:     3/3 ppd x 40 yrs. Currently 3-4 cigarettes daily .He is trying  to quit. Is using a Vapor cigarettes  2-3 x's a day.   Substance and Sexual Activity    Alcohol use: Not Currently    Drug use: No    Sexual activity: Not Currently       Objective:     Vital Signs (Most Recent):  Temp: 98 °F (36.7 °C) (23 1100)  Pulse: (!) 122 (23 1415)  Resp: (!) 23 (23 1415)  BP: 115/63 (23 1330)  SpO2: (!) 92 % (23 1415)   Vital Signs (24h Range):  Temp:  [97.9 °F (36.6 °C)-98 °F (36.7 °C)] 98 °F (36.7 °C)  Pulse:  [] 122  Resp:  [19-46] 23  SpO2:  [78 %-99 %] 92 %  BP: ()/(50-78) 115/63     Weight: 97.3 kg (214 lb 8.1 oz)  Body mass index is 32.62 kg/m².    Physical Exam  Constitutional:       General: He is sleeping. He is not in acute distress.     Appearance: He is ill-appearing.   HENT:      Head: Atraumatic.      Comments: History of facial/oral reconstruction   Pulmonary:      Effort: Pulmonary effort is normal.      Comments: Trach  Abdominal:      Comments: PEG   Musculoskeletal:      Right lower leg: No edema.      Left lower leg: No edema.   Neurological:       Mental Status: He is oriented to person, place, and time. He is lethargic.      Comments: Sleeping; arousable/follows commands    Psychiatric:         Mood and Affect: Mood is anxious. Affect is angry.         Behavior: Behavior is agitated and aggressive.      Comments: Frustration with difficulties with communication; improved anxiousness 3/28 compared to 3/27       Advance Care Planning   Advance Directives:   Living Will: No    LaPOST: No    Do Not Resuscitate Status: No    Medical Power of : No      Decision Making:  Patient answered questions and Family answered questions  Goals of Care: What is most important right now is to focus on spending time at home, remaining as independent as possible, symptom/pain control, curative/life-prolongation (regardless of treatment burdens). Accordingly, we have decided that the best plan to meet the patient's goals includes continuing with treatment.       Significant Labs: All pertinent labs within the past 24 hours have been reviewed.  CBC:   Recent Labs   Lab 04/04/23 0359   WBC 7.14   HGB 7.6*   HCT 25.4*   MCV 97          BMP:  Recent Labs   Lab 04/04/23 0359   *   *   K 3.7      CO2 32*   BUN 34*   CREATININE 0.9   CALCIUM 8.6*       LFT:  Lab Results   Component Value Date    AST 39 04/03/2023    ALKPHOS 100 04/03/2023    BILITOT 0.8 04/03/2023     Albumin:   Albumin   Date Value Ref Range Status   04/03/2023 2.5 (L) 3.5 - 5.2 g/dL Final     Protein:   Total Protein   Date Value Ref Range Status   04/03/2023 6.0 6.0 - 8.4 g/dL Final     Lactic acid:   Lab Results   Component Value Date    LACTATE 1.8 03/19/2023    LACTATE 0.8 05/14/2022       Significant Imaging: I have reviewed all pertinent imaging results/findings within the past 24 hours.

## 2023-04-04 NOTE — ASSESSMENT & PLAN NOTE
- PEG for feeding and meds; tube feeds typically well tolerated and no difficulty with home management by wife   - pt initially refused continuous feeds reccommended by dietician upon admit, requesting bolus feeds; as of 4/4 requesting to return to continuous feeds to decrease awakening/distrubing to provide feeds    - noted; management per hospital primary

## 2023-04-04 NOTE — CONSULTS
Nursing requested assessment of perirectal skin while patient cleansed of fecal incontinence.   Erythema of perirectal skin; 2.5 cm ring of erythema around anus. At 12 o'clock area/above anus, patient has 2 pin sized openings. No history of perirectal disease, according to wife. Not a pressure injury.   Rec: Treatment of perirectal skin breakdown with cleansing and application of zinc oxide product. Discussed with nursing.

## 2023-04-04 NOTE — PROGRESS NOTES
[facility] Medicine  Progress Note    Patient Name: Fareed Richard Jr.  MRN: [id number]  Patient Class: IP- Inpatient   Admission Date: 3/18/2023  Length of Stay: 16 days  Attending Physician: Andry Zhao MD  Primary Care Provider: Kodi Tubbs MD        Subjective:     Principal Problem:Acute blood loss anemia        HPI:  Fareed Richard Jr. 73 y.o. 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  Per his wife 4 days ago he began to have pink tinged sputum via his trach.  He was seen by his ENT 2 days ago with a scope exam and a trach exchange without evidence of bleeding.  This morning at 3:00 a.m. he developed bright red blood via trach which resolved without intervention.  It is since not recurred.  He takes a daily aspirin.  He receives all medications via G-tube.  His tube feeding regimen consists of 6 cans of Isosource daily with 120 cc free water boluses.  He denies fever chest pain shortness of breath nausea vomiting abdominal pain leg swelling syncope dizziness dysuria melena hematuria hematemesis.    In the ED, hemoglobin 7.6 INR 1.0 BUN 50 creatinine 0.7  troponin negative BRCA negative O positive type and screen chest x-ray without detrimental change hypotensive to 85/54.      Overview/Hospital Course:  74 yo M w squamous CA of tongue s/p glossectomy and reconstructive flap with trach, CAD, chronic hypoxic respiratory failure (on 5L at home) and with peg presented for eval of hemoptysis 2 days after trach change in clinic.  Transferred to ICU 3/19 when markedly hypotensive.  Unclear if due to hemorrhage, cardiogenic or septic shock.  Did require PRBC and TXA nebs but didn't seem like sufficient bleeding to cause shock.  Bleeding has stopped and ENT is on board and following.  CTA shows either significant mucus plugging or bibasilar pneumonia -  pulmonology favored pneumonia.  Also has urine culture growing Enterococcus.  He is on broad spectrum antibiotics. Troponin checked due to hypotension and it was 1.17.  Cards consulted and feel it's demand due to ischemia and hypotension and recommend outpatient stress. AM cortisol was low so ordered cosyntropin stim test which shows an adequate adrenal response to r/o adrenal insufficiency.  Developed severe shoulder pain and hypoxia, increasing troponin and pulmonary edema on CXR - shoulder pain likely anginal equivalent. Discussed with Cardiology, started heparin and nitro drip. Required nitro drip for chest pain. Tolerating heparin drip without evidence of further tracheostomy bleeding. Further discussions with Cardiology, and brought patient for LHC/angiogram on 3/23 which showed distal LM 30%, mild LAD 90% bifurcation lesion with D1, Cx mid 80%, RCA moderate diffuse disease with long 70% and some left to right collateral. All vessels heavily calcified. Not a candidate for CABG but plan for staged PCI. Continues on heparin drip, asa/plavix and tolerating. Off pressor support/nitro drip. No further bleeding and Hb remains stable. Had episodes of hypoxia/respiratory distress after vomiting and was intermittently placed on ventilator -  now back to trach collar close to home O2 requirements. Plan for angiogram/PCI on 3/27 with Dr. Bhatt.    Pt went for LHC w/ PCI to LAD and Lcx. Post operatively he was hemodynamically unstable and anemic. Stat CTA showed RP hematoma near L kidney without acute bleeding. Pt transfused supportively. He improved over the next few days, however still required periodic transfusion. On 3/31 he started coughing up blood from his trach and became hypotensive/tachycardic.      Interval History:   No events overnight.   Hb appropriately altagracia from 6.7 to 7.6 after 1U PRBC (hb 10 was an error)    Review of Systems   Constitutional:  Positive for activity change and fatigue.    Cardiovascular:  Negative for chest pain.   Gastrointestinal:  Negative for abdominal pain.     Objective:     Vital Signs (Most Recent):  Temp: 97.9 °F (36.6 °C) (04/04/23 0000)  Pulse: 100 (04/04/23 0844)  Resp: (!) 33 (04/04/23 0909)  BP: 96/64 (04/04/23 0859)  SpO2: 95 % (04/04/23 0844)   Vital Signs (24h Range):  Temp:  [97.9 °F (36.6 °C)] 97.9 °F (36.6 °C)  Pulse:  [] 100  Resp:  [19-46] 33  SpO2:  [82 %-99 %] 95 %  BP: ()/(50-72) 96/64     Weight: 67 kg (147 lb 11.3 oz)  Body mass index is 22.46 kg/m².    Intake/Output Summary (Last 24 hours) at 4/4/2023 1123  Last data filed at 4/4/2023 0735  Gross per 24 hour   Intake 1215.8 ml   Output 710 ml   Net 505.8 ml        Physical Exam  Vitals reviewed.   Constitutional:       General: He is not in acute distress.     Appearance: He is well-developed and normal weight. He is ill-appearing. He is not toxic-appearing or diaphoretic.   HENT:      Head: Normocephalic and atraumatic.   Eyes:      General: No scleral icterus.     Pupils: Pupils are equal, round, and reactive to light.   Neck:      Thyroid: No thyromegaly.   Cardiovascular:      Rate and Rhythm: Normal rate and regular rhythm.      Heart sounds: No murmur heard.    No gallop.   Pulmonary:      Effort: Pulmonary effort is normal. No respiratory distress.      Breath sounds: Normal breath sounds. No stridor.   Abdominal:      General: Bowel sounds are normal. There is no distension.      Palpations: Abdomen is soft.      Tenderness: There is no abdominal tenderness.   Musculoskeletal:         General: No deformity. Normal range of motion.      Cervical back: Normal range of motion.   Skin:     General: Skin is warm.      Capillary Refill: Capillary refill takes less than 2 seconds.      Coloration: Skin is not jaundiced.      Findings: No bruising.   Neurological:      Mental Status: He is alert and oriented to person, place, and time.      Motor: Weakness present.      Gait: Gait abnormal.    Psychiatric:         Mood and Affect: Mood normal.         Behavior: Behavior normal.           Recent Results (from the past 24 hour(s))   POCT glucose    Collection Time: 04/03/23  5:10 PM   Result Value Ref Range    POCT Glucose 106 70 - 110 mg/dL   POCT glucose    Collection Time: 04/03/23  9:21 PM   Result Value Ref Range    POCT Glucose 152 (H) 70 - 110 mg/dL   CBC Auto Differential    Collection Time: 04/04/23  3:59 AM   Result Value Ref Range    WBC 7.14 3.90 - 12.70 K/uL    RBC 2.63 (L) 4.60 - 6.20 M/uL    Hemoglobin 7.6 (L) 14.0 - 18.0 g/dL    Hematocrit 25.4 (L) 40.0 - 54.0 %    MCV 97 82 - 98 fL    MCH 28.9 27.0 - 31.0 pg    MCHC 29.9 (L) 32.0 - 36.0 g/dL    RDW 18.1 (H) 11.5 - 14.5 %    Platelets 166 150 - 450 K/uL    MPV 9.4 9.2 - 12.9 fL    Immature Granulocytes 0.6 (H) 0.0 - 0.5 %    Gran # (ANC) 5.8 1.8 - 7.7 K/uL    Immature Grans (Abs) 0.04 0.00 - 0.04 K/uL    Lymph # 0.4 (L) 1.0 - 4.8 K/uL    Mono # 0.8 0.3 - 1.0 K/uL    Eos # 0.2 0.0 - 0.5 K/uL    Baso # 0.01 0.00 - 0.20 K/uL    nRBC 0 0 /100 WBC    Gran % 81.5 (H) 38.0 - 73.0 %    Lymph % 4.9 (L) 18.0 - 48.0 %    Mono % 10.8 4.0 - 15.0 %    Eosinophil % 2.1 0.0 - 8.0 %    Basophil % 0.1 0.0 - 1.9 %    Differential Method Automated    Basic metabolic panel    Collection Time: 04/04/23  3:59 AM   Result Value Ref Range    Sodium 148 (H) 136 - 145 mmol/L    Potassium 3.7 3.5 - 5.1 mmol/L    Chloride 107 95 - 110 mmol/L    CO2 32 (H) 23 - 29 mmol/L    Glucose 129 (H) 70 - 110 mg/dL    BUN 34 (H) 8 - 23 mg/dL    Creatinine 0.9 0.5 - 1.4 mg/dL    Calcium 8.6 (L) 8.7 - 10.5 mg/dL    Anion Gap 9 8 - 16 mmol/L    eGFR >60 >60 mL/min/1.73 m^2       Microbiology Results (last 7 days)       Procedure Component Value Units Date/Time    Culture, Respiratory with Gram Stain [[id number]]  (Abnormal)  (Susceptibility) Collected: 03/22/23 1528    Order Status: Completed Specimen: Respiratory from Endotracheal Aspirate Updated: 03/29/23 0715     Respiratory  "Culture STENOTROPHOMONAS (X.) MALTOPHILIA  Moderate        ACHROMOBACTER XYLOSOXIDANS SUBSP. DENTRIFICANS  Few        LIZETH GLABRATA  Few       Gram Stain (Respiratory) <10 epithelial cells per low power field.     Gram Stain (Respiratory) Few WBC's     Gram Stain (Respiratory) Rare Gram negative rods     Gram Stain (Respiratory) Rare yeast             Imaging Results              X-Ray Chest AP Portable (Final result)  Result time 03/18/23 12:01:13      Final result by Mariano Soto MD (03/18/23 12:01:13)                   Impression:      No detrimental change when compared with 12/29/2022.      Electronically signed by: Mariano Soto MD  Date:    03/18/2023  Time:    12:01               Narrative:    EXAMINATION:  XR CHEST AP PORTABLE    CLINICAL HISTORY:  Provided history is "chest pain;  ".    TECHNIQUE:  One view of the chest.    COMPARISON:  12/29/2022.    FINDINGS:  Tracheostomy tube is present with the tip overlying the tracheal air column between the clavicular heads and sherine.  Cardiac silhouette is borderline enlarged and similar to prior study.  Atherosclerotic calcifications overlie the aortic arch.  Probable pulmonary emphysema.  Coarsened interstitial lung markings with bibasilar subsegmental atelectasis.  Small left and trace right pleural effusions, similar to the prior study.  No pneumothorax.  Aeration is overall similar when compared with 12/29/2022. Probable percutaneous gastrostomy tube overlies the left upper quadrant of the abdomen.                                            Assessment/Plan:      * Acute blood loss anemia  Pt presented with hemoptysis. Scopes prior to admit and by ENT during admit not showing clear source. Bleeding improved. Pt underwent LHC for NSTEMI. After that procedure he was found to have a large retroperitoneal hematoma. Since starting DAPT he has also had recurrent hemoptysis as well as melanic stools. Trach cuff reinflated 4/2 with improvement in " "bleeding.  - trend CBC, transfuse prn  - empiric PPI for melena, however it appears this is blood from hemoptysis that was swallowed    Retroperitoneal hematoma  RP hematoma discovered after LHC. See "acute blood loss anemia"      NSTEMI (non-ST elevated myocardial infarction)  See "coronary artery disease"      Anxiety  Anxiety waxes and wanes with clinical status    UTI (urinary tract infection)  Urine culture growing Enterococcus > 100,000 cfu/ml, s/p appropriate treatment      Hemoptysis  -See acute blood loss anemia    Hypotension  Presented with hypotension and bleeding from tracheostomy/hemoptysis.  Patient does have lower blood pressure readings however this blood pressure was concerning in the 70s.  Patient did have some bleeding but did not suspect it was hemorrhagic.  Also thought to be septic due to possible UTI/pneumonia and being treated for antibiotics.  Also concerns for cardiogenic with an NSTEMI and being on nitro.  He was treated appropriately with antibiotics for his UTI/pneumonia and was also treated for NSTEMI.  Intermittently requiring pressor support with sedation after he was put on the ventilator.  Now on trach collar.  Hypotension has now resolved and he is doing well.    Pt went into hemorrhagic shock on 3/27 w/ L RP bleed. No evidence of continued bleeding on CTA. Levophed weaned.        Elevated brain natriuretic peptide (BNP) level  Diuresis held due to soft blood pressures      PEG (percutaneous endoscopic gastrostomy) status  Continue medications via PEG. Does not take oral intake.   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      Acute on chronic respiratory failure " with hypoxia  At home is on 5L O2 via trach and O2 sats typically in high 80s low 90s. Worsening respiration this admission due to aspiration of blood and likely food aspiration. Required ventilator from 3/21 to 3/23, subsequently weaned. Treated empirically for pneumonia. Respiratory cultures show bugs not susceptible to the antibiotics given, however given the overall clinical respiratory improvement will not restart antibiotics.   - pulm/crit, ENT following peripherally  - Currently on 10 liters trach collar  - diuresis held due to soft blood pressures and blood loss    Other hyperlipidemia  -Continue home statin    Primary hypertension  -BP typically in the 90s to low 100s systolic per chart review.   -Noted hypotension 3/19 which is addressed above.  -Holding home metoprolol and amlodipine    Coronary artery disease involving native coronary artery of native heart with unstable angina pectoris  Troponin elevated when pt was upgraded to the ICU. At that time it was thought to be due to demand, however he had persistent chest pain concerning for ACS. Was started on heparin gtt and tolerated. Subsequently went to [facility] which showed diffuse disease. Pt not a candidate for CABG. He subsequently underwent PCI of LAD and LCx.  - DAPT  - holding metoprol due to soft pressures    Squamous cell cancer of tongue  Dx 2021, now s/p total glossectomy, bilateral neck dissection, bilateral cervical facial flaps and anterolateral thigh flap reconstruction of glossectomy defect 1/4/22. Completed adjuvant chemoradiation. Had trach changed by ENT 2 days prior to admit and scope at that time showed no signs of bleeding.         VTE Risk Mitigation (From admission, onward)           Ordered     IP VTE HIGH RISK PATIENT  Once         03/18/23 1620     Place sequential compression device  Until discontinued         03/18/23 1620     Place NIKITA hose  Until discontinued         03/18/23 1620                    Discharge Planning   NISA:       Code Status: Full Code   Is the patient medically ready for discharge?:     Reason for patient still in hospital (select all that apply): Patient trending condition, Laboratory test, Treatment, Consult recommendations and Pending disposition  Discharge Plan A: Home with family   Discharge Delays: None known at this time        Critical care time spent on the evaluation and treatment of severe organ dysfunction, review of pertinent labs and imaging studies, discussions with consulting providers and discussions with patient/family: 55 minutes.      Andry Zhao MD  Department of Hospital Medicine   [facility] [place] - Intensive Care

## 2023-04-04 NOTE — NURSING
[facility] [place]  Nurses Note -- 4 Eyes      4/4/2023       Skin assessed on: Q Shift       [x]No Pressure Injuries Present    [x]Prevention Measures Documented    [] Yes LDA  for Pressure Injury Previously documented     [] Yes New Pressure Injury Discovered   [] LDA for New Pressure Injury Added      Attending RN:  Zunilda Leavitt RN     Second RN:  Diamante Coppola RN

## 2023-04-04 NOTE — PROGRESS NOTES
[facility] - Intensive Care  Palliative Medicine  Progress Note    Patient Name: Fareed Richard Jr.  MRN: [id number]  Admission Date: 3/18/2023  Hospital Length of Stay: 16 days  Code Status: Full Code   Attending Provider: Andry Zhao MD  Consulting Provider: Trupti Beck NP  Primary Care Physician: Kodi Tubbs MD  Principal Problem:Acute blood loss anemia    Patient information was obtained from patient, spouse/SO and primary team.      Assessment/Plan:     Palliative Care  Advance Care Planning   4/4/2023  - interval chart reviewed in detail; discussed pt during ICU MDT rounds   - called pt's wife, Bridgett, to discuss continued admission, pt's baseline prior to admission, and her goals prior to discharge as pt has begun to exhibit frustration with nursing care per bedside nurses (ex. Not allowing for cleaning post BM incontinence)  - Bridgett expressed frustration with pt's behavior towards nursing staff and overall at this time; she shared her concerns for her ability to care for pt at home given current status; prior to admit pt was able to walk to restroom and other small ADLs with use of walker, he was also continent to bowel and bladder; she needs him to be closer to pre-admit baseline before he can come home   - later met with pt and shared wife's concerns and goals that need to occur for him to be able to go home; discussed importance of participating with PT (if BP/oxygenation allows) and to allow for necessary medical and nursing care; discussed risks with care he is refusing or not actively participating in  - validated pt's frustration with long admit and the acuteness/frequency of his care; assured nurses are grouping care as much as possible to allow for rest but he is very sick and requires a lot of care   - pt continues to advocate that he wishes for full treatment and full code; explained this requires his compliance with treatment/care plans; but if at any point he wishes to stop aggressive  measures/treatment and discuss measures to improve comfort and quality of life we can discuss   - emotional support provided to pt and wife during interactions; allowed time for questions/concerns   - wife plans to visit this afternoon to discuss GOC with pt; she is requesting psych consult and concerned about pt's mental decline and ability to make safe medical decisions    - updated MDT; ongoing communication     (See previous ACP notes 3/22 (consult), 3/23, 3/27, 3/28)     Psychiatric  Anxiety  - Continue PRN hydroxyzine (consider scheduled if pt agreeable); encouraged QHS dose. Likely would benefit from initiation of SSRI, but concerns due to continued episodes of bleeding  - encourage hydroxyzine prior to planned PT as anxiety has been another barrier to participation    - pt's wife advocating for psych consult; share concern for pt's ability to participate in tele-psych visit; she is concerned for his ability to make medical decisions and current behavior not consistent with baseline (see ACP and nursing notes; discussed with Dr. Zhao)   - noted; management per hospital primary     ENT  Tracheostomy present  - was initially admitted with significant bleeding to [facility] site, an additional episode during admission; now resolved  - pt has frustrations with limited ability to verbalize/communicate; can become frustrated/anxious/dyspneic at times related to this; writes in a notebook often to support communication   - noted; management per hospital primary    Pulmonary  Hemoptysis  - see Trach   - noted; management per hospital primary     Cardiac/Vascular  NSTEMI (non-ST elevated myocardial infarction)  - Echo showed EF 60%, inferobasal hypokinesis, indeterminate diastolic function  - nitro drip weaned; pt denying any continued angina  - cardiac cath procedure on 3/23 and 3/27 with stent placements ; per cardiology poor CABG candidate   - continued concern for pt's tolerance/bleeding risk, but anticoagulation  necessary give recent stent placements; cardiology following   - noted; management per hospital primary, and Cardiology     Hypotension  - continued hypotension through out admit; no longer require pressor support  - has had difficulty with participating with PT due to this; both hypotension and physical decline continues to be barrier to discharge   - has been anemic due to episodes of bleeding; has required multiple transfusions throughout admit   - noted; management per hospital primary     Coronary artery disease involving native coronary artery of native heart with unstable angina pectoris  - see NSTEMI   - noted; management per hospital primary, and Cardiology     Oncology  * Acute blood loss anemia  - multiple bleeding events since admit; trach hemoptysis on admit, and another episode during admit; retroperitoneal hematoma 3/28  - pt now with black stools; wife requesting GI evaluation as pt had similar dark stools 9/22 and refused 2nd bowel prep after failed to clear for first bowel prep for scope while inpatient (discussed with Dr. Zhao)   - noted; management per hospital primary     Squamous cell cancer of tongue  -Diagnosed 12/15/21 via FNA; Underwent total glossectomy, bilateral neck dissection, bilateral cervical facial flaps and anterolateral thigh flap reconstruction of glossectomy defect; Completed adjuvant chemoradiation  - Followed by oncology and ENT outpt. No longer on chemo or radiation.   - has a trach since above treatment   - noted; management per hospital primary     GI  PEG (percutaneous endoscopic gastrostomy) status  - PEG for feeding and meds; tube feeds typically well tolerated and no difficulty with home management by wife   - pt initially refused continuous feeds reccommended by dietician upon admit, requesting bolus feeds; as of 4/4 requesting to return to continuous feeds to decrease awakening/distrubing to provide feeds    - noted; management per hospital primary     I will  "follow-up with patient. Please contact us if you have any additional questions.    Total visit time: 85 minutes    > 50% of  35  min visit spent in chart review, face to face discussion of symptom assessment, coordination of care with other specialists, and discharge planning.    50 min ACP time spent: goals of care, emotional support, formulating and communicating prognosis, exploring burden/ benefit of various approaches of treatment.     Trupti Beck NP  Palliative Medicine  [facility] - Intensive Care    Subjective:     Chief Complaint:   Chief Complaint   Patient presents with    Hemoptysis     Ems called to 74yo male that wife noticed was having blood y sputum in his trach on Wednesday. Went thursdsay to have a trach change and the dr was unale to scope his mouth above the trach due to him gagging but did change the trach. Continued to have the pink sputum until 0300 today and it had bright red blood from trach. Denied any pain or SOB       HPI:   From H&P: " Fareed Richard . 73 y.o. 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  Per his wife 4 days ago he began to have pink tinged sputum via his trach.  He was seen by his ENT 2 days ago with a scope exam and a trach exchange without evidence of bleeding.  This morning at 3:00 a.m. he developed bright red blood via trach which resolved without intervention.  It is since not recurred.  He takes a daily aspirin.  He receives all medications via G-tube.  His tube feeding regimen consists of 6 cans of Isosource daily with 120 cc free water boluses.  He denies fever chest pain shortness of breath nausea vomiting abdominal pain leg swelling syncope dizziness dysuria melena hematuria hematemesis.     In the ED, hemoglobin 7.6 INR 1.0 BUN 50 creatinine 0.7  troponin negative BRCA negative O positive type and screen chest x-ray without " "detrimental change hypotensive to 85/54."     Palliative medicine consulted for goals of care and advance care planning; Met with pt at bedside; for details of visit, see advance care planning section of plan.         Hospital Course:  No notes on file    Past Medical History:   Diagnosis Date    Cancer     skin to Rt forearm and face    COPD (chronic obstructive pulmonary disease)     Hyperlipidemia     Hypertension     Pseudoaneurysm      Past Surgical History:   Procedure Laterality Date    ABDOMINAL SURGERY      stents placed in liver and large intestines, per patient    ANGIOGRAPHY OF AORTIC ARCH  3/27/2023    Procedure: Aortogram, Aortic Arch;  Surgeon: Tim Bhatt MD;  Location: [facility] CATH LAB;  Service: Cardiology;;    AORTOGRAPHY WITH SERIALOGRAPHY N/A 3/27/2023    Procedure: AORTOGRAM, WITH SERIALOGRAPHY;  Surgeon: Tim Bhatt MD;  Location: [facility] CATH LAB;  Service: Cardiology;  Laterality: N/A;    CAROTID STENT      COLONOSCOPY N/A 09/27/2022    Procedure: COLONOSCOPY;  Surgeon: Donnie Peterson MD;  Location: [facility] ENDO (2ND FLR);  Service: Endoscopy;  Laterality: N/A;    COLONOSCOPY N/A 09/30/2022    Procedure: COLONOSCOPY;  Surgeon: Joy Cabrera MD;  Location: [facility] (2ND FLR);  Service: Endoscopy;  Laterality: N/A;    CORONARY STENT PLACEMENT  01/2000    DISSECTION OF NECK Bilateral 01/04/2022    Procedure: DISSECTION, NECK;  Surgeon: Naeem Smalls MD;  Location: [facility] OR 2ND FLR;  Service: ENT;  Laterality: Bilateral;    ESOPHAGOGASTRODUODENOSCOPY N/A 09/27/2022    Procedure: EGD (ESOPHAGOGASTRODUODENOSCOPY);  Surgeon: Donnie Peterson MD;  Location: [facility] (2ND FLR);  Service: Endoscopy;  Laterality: N/A;    ESOPHAGOGASTRODUODENOSCOPY N/A 09/30/2022    Procedure: EGD (ESOPHAGOGASTRODUODENOSCOPY);  Surgeon: Joy Cabrera MD;  Location: [facility] ENDO ([facility] FLR);  Service: Endoscopy;  Laterality: N/A;    ESOPHAGOGASTRODUODENOSCOPY W/ PEG N/A 01/04/2022    Procedure: EGD, " WITH PEG TUBE INSERTION;  Surgeon: Anthony Calabrese MD;  [facility];  Service: General;  Laterality: N/A;    EYE SURGERY      Cataract bilateral    femoral stents      bilateral    FLAP PROCEDURE N/A 01/03/2022    Procedure: CREATION, FREE FLAP;  Surgeon: Naeem Smalls MD;  [facility];  Service: ENT;  Laterality: N/A;    FLAP PROCEDURE Right 01/04/2022    Procedure: CREATION, FREE FLAP;  Surgeon: Stacey Conde MD;  [facility];  Service: ENT;  Laterality: Right;  Ischemic start 1203  Ischemic stop 1353    GLOSSECTOMY N/A 01/04/2022    Procedure: GLOSSECTOMY;  Surgeon: Naeem Smalls MD;  [facility];  Service: ENT;  Laterality: N/A;    LEFT HEART CATHETERIZATION Left 3/23/2023    Procedure: Left heart cath;  Surgeon: Tacos Benitez MD;  Location: [facility] CATH LAB;  Service: Cardiology;  Laterality: Left;    PERCUTANEOUS TRANSLUMINAL BALLOON ANGIOPLASTY OF CORONARY ARTERY Left 3/27/2023    Procedure: Angioplasty-coronary;  Surgeon: Tim Bhatt MD;  Location: [facility] CATH LAB;  Service: Cardiology;  Laterality: Left;  not before 9am, R rad access, 6 Fr EBU 3.5 guide    RADICAL NECK DISSECTION Left 01/03/2022    Procedure: DISSECTION, NECK, RADICAL;  Surgeon: Naeem Smalls MD;  Location: [address];  Service: ENT;  Laterality: Left;    SKIN BIOPSY      SKIN CANCER EXCISION      STENT, CORONARY, MULTI VESSEL  3/27/2023    Procedure: Stent, Coronary, Multi Vessel;  Surgeon: Tim Bhatt MD;  Location: [facility] CATH LAB;  Service: Cardiology;;    TRACHEOTOMY N/A 01/04/2022    Procedure: TRACHEOTOMY;  Surgeon: Naeem Smalls MD;  [facility];  Service: ENT;  Laterality: N/A;    VASCULAR SURGERY       Review of patient's allergies indicates:   Allergen Reactions    Ativan [lorazepam] Anxiety       Medications:  Continuous Infusions:   nitroGLYCERIN       Scheduled Meds:   albuterol-ipratropium  3  mL Nebulization Q12H    aspirin  81 mg Oral Daily    atorvastatin  80 mg Per G Tube Daily    clopidogreL  75 mg Oral Daily    ferrous sulfate  1 tablet Per G Tube Daily    glycopyrrolate  1 mg Per G Tube TID    LIDOcaine  2 patch Transdermal Q24H    melatonin  9 mg Per G Tube Nightly    metoprolol tartrate  25 mg Per G Tube BID    pantoprazole  40 mg Intravenous Daily    sodium chloride 0.9%  10 mL Intravenous Q6H     PRN Meds:sodium chloride, sodium chloride, acetaminophen, acetaminophen, acetaminophen, atropine, bisacodyL, fentaNYL, guaiFENesin 100 mg/5 ml, hydrOXYzine HCL, insulin aspart U-100, morphine, naloxone, nitroGLYCERIN, nitroGLYCERIN, ondansetron, ondansetron, oxyCODONE, phenyleph-min oil-petrolatum, sodium chloride 0.9%, Flushing PICC Protocol **AND** sodium chloride 0.9% **AND** sodium chloride 0.9%    Family History    None       Tobacco Use    Smoking status: Former     Packs/day: 2.00     Years: 40.00     Pack years: 80.00     Types: Cigarettes     Start date: 1963     Quit date: 2018     Years since quittin.9    Smokeless tobacco: Never    Tobacco comments:     3/3 ppd x 40 yrs. Currently 3-4 cigarettes daily .He is trying  to quit. Is using a Vapor cigarettes  2-3 x's a day.   Substance and Sexual Activity    Alcohol use: Not Currently    Drug use: No    Sexual activity: Not Currently       Objective:     Vital Signs (Most Recent):  Temp: 98 °F (36.7 °C) (23 1100)  Pulse: (!) 122 (23 1415)  Resp: (!) 23 (23 1415)  BP: 115/63 (23 1330)  SpO2: (!) 92 % (23 1415)   Vital Signs (24h Range):  Temp:  [97.9 °F (36.6 °C)-98 °F (36.7 °C)] 98 °F (36.7 °C)  Pulse:  [] 122  Resp:  [19-46] 23  SpO2:  [78 %-99 %] 92 %  BP: ()/(50-78) 115/63     Weight: 97.3 kg (214 lb 8.1 oz)  Body mass index is 32.62 kg/m².    Physical Exam  Constitutional:       General: He is sleeping. He is not in acute distress.     Appearance: He is ill-appearing.    HENT:      Head: Atraumatic.      Comments: History of facial/oral reconstruction   Pulmonary:      Effort: Pulmonary effort is normal.      Comments: Trach  Abdominal:      Comments: PEG   Musculoskeletal:      Right lower leg: No edema.      Left lower leg: No edema.   Neurological:      Mental Status: He is oriented to person, place, and time. He is lethargic.      Comments: Sleeping; arousable/follows commands    Psychiatric:         Mood and Affect: Mood is anxious. Affect is angry.         Behavior: Behavior is agitated and aggressive.      Comments: Frustration with difficulties with communication; improved anxiousness 3/28 compared to 3/27       Advance Care Planning   Advance Directives:   Living Will: No    LaPOST: No    Do Not Resuscitate Status: No    Medical Power of : No      Decision Making:  Patient answered questions and Family answered questions  Goals of Care: What is most important right now is to focus on spending time at home, remaining as independent as possible, symptom/pain control, curative/life-prolongation (regardless of treatment burdens). Accordingly, we have decided that the best plan to meet the patient's goals includes continuing with treatment.       Significant Labs: All pertinent labs within the past 24 hours have been reviewed.  CBC:   Recent Labs   Lab 04/04/23 0359   WBC 7.14   HGB 7.6*   HCT 25.4*   MCV 97          BMP:  Recent Labs   Lab 04/04/23 0359   *   *   K 3.7      CO2 32*   BUN 34*   CREATININE 0.9   CALCIUM 8.6*       LFT:  Lab Results   Component Value Date    AST 39 04/03/2023    ALKPHOS 100 04/03/2023    BILITOT 0.8 04/03/2023     Albumin:   Albumin   Date Value Ref Range Status   04/03/2023 2.5 (L) 3.5 - 5.2 g/dL Final     Protein:   Total Protein   Date Value Ref Range Status   04/03/2023 6.0 6.0 - 8.4 g/dL Final     Lactic acid:   Lab Results   Component Value Date    LACTATE 1.8 03/19/2023    LACTATE 0.8 05/14/2022        Significant Imaging: I have reviewed all pertinent imaging results/findings within the past 24 hours.

## 2023-04-04 NOTE — ASSESSMENT & PLAN NOTE
- was initially admitted with significant bleeding to trach site, an additional episode during admission; now resolved  - pt has frustrations with limited ability to verbalize/communicate; can become frustrated/anxious/dyspneic at times related to this; writes in a notebook often to support communication   - noted; management per hospital primary

## 2023-04-04 NOTE — SUBJECTIVE & OBJECTIVE
Interval History:   No events overnight.   Hb appropriately altagracia from 6.7 to 7.6 after 1U PRBC (hb 10 was an error)    Review of Systems   Constitutional:  Positive for activity change and fatigue.   Cardiovascular:  Negative for chest pain.   Gastrointestinal:  Negative for abdominal pain.     Objective:     Vital Signs (Most Recent):  Temp: 97.9 °F (36.6 °C) (04/04/23 0000)  Pulse: 100 (04/04/23 0844)  Resp: (!) 33 (04/04/23 0909)  BP: 96/64 (04/04/23 0859)  SpO2: 95 % (04/04/23 0844)   Vital Signs (24h Range):  Temp:  [97.9 °F (36.6 °C)] 97.9 °F (36.6 °C)  Pulse:  [] 100  Resp:  [19-46] 33  SpO2:  [82 %-99 %] 95 %  BP: ()/(50-72) 96/64     Weight: 67 kg (147 lb 11.3 oz)  Body mass index is 22.46 kg/m².    Intake/Output Summary (Last 24 hours) at 4/4/2023 1123  Last data filed at 4/4/2023 0735  Gross per 24 hour   Intake 1215.8 ml   Output 710 ml   Net 505.8 ml        Physical Exam  Vitals reviewed.   Constitutional:       General: He is not in acute distress.     Appearance: He is well-developed and normal weight. He is ill-appearing. He is not toxic-appearing or diaphoretic.   HENT:      Head: Normocephalic and atraumatic.   Eyes:      General: No scleral icterus.     Pupils: Pupils are equal, round, and reactive to light.   Neck:      Thyroid: No thyromegaly.   Cardiovascular:      Rate and Rhythm: Normal rate and regular rhythm.      Heart sounds: No murmur heard.    No gallop.   Pulmonary:      Effort: Pulmonary effort is normal. No respiratory distress.      Breath sounds: Normal breath sounds. No stridor.   Abdominal:      General: Bowel sounds are normal. There is no distension.      Palpations: Abdomen is soft.      Tenderness: There is no abdominal tenderness.   Musculoskeletal:         General: No deformity. Normal range of motion.      Cervical back: Normal range of motion.   Skin:     General: Skin is warm.      Capillary Refill: Capillary refill takes less than 2 seconds.      Coloration:  Skin is not jaundiced.      Findings: No bruising.   Neurological:      Mental Status: He is alert and oriented to person, place, and time.      Motor: Weakness present.      Gait: Gait abnormal.   Psychiatric:         Mood and Affect: Mood normal.         Behavior: Behavior normal.           Recent Results (from the past 24 hour(s))   POCT glucose    Collection Time: 04/03/23  5:10 PM   Result Value Ref Range    POCT Glucose 106 70 - 110 mg/dL   POCT glucose    Collection Time: 04/03/23  9:21 PM   Result Value Ref Range    POCT Glucose 152 (H) 70 - 110 mg/dL   CBC Auto Differential    Collection Time: 04/04/23  3:59 AM   Result Value Ref Range    WBC 7.14 3.90 - 12.70 K/uL    RBC 2.63 (L) 4.60 - 6.20 M/uL    Hemoglobin 7.6 (L) 14.0 - 18.0 g/dL    Hematocrit 25.4 (L) 40.0 - 54.0 %    MCV 97 82 - 98 fL    MCH 28.9 27.0 - 31.0 pg    MCHC 29.9 (L) 32.0 - 36.0 g/dL    RDW 18.1 (H) 11.5 - 14.5 %    Platelets 166 150 - 450 K/uL    MPV 9.4 9.2 - 12.9 fL    Immature Granulocytes 0.6 (H) 0.0 - 0.5 %    Gran # (ANC) 5.8 1.8 - 7.7 K/uL    Immature Grans (Abs) 0.04 0.00 - 0.04 K/uL    Lymph # 0.4 (L) 1.0 - 4.8 K/uL    Mono # 0.8 0.3 - 1.0 K/uL    Eos # 0.2 0.0 - 0.5 K/uL    Baso # 0.01 0.00 - 0.20 K/uL    nRBC 0 0 /100 WBC    Gran % 81.5 (H) 38.0 - 73.0 %    Lymph % 4.9 (L) 18.0 - 48.0 %    Mono % 10.8 4.0 - 15.0 %    Eosinophil % 2.1 0.0 - 8.0 %    Basophil % 0.1 0.0 - 1.9 %    Differential Method Automated    Basic metabolic panel    Collection Time: 04/04/23  3:59 AM   Result Value Ref Range    Sodium 148 (H) 136 - 145 mmol/L    Potassium 3.7 3.5 - 5.1 mmol/L    Chloride 107 95 - 110 mmol/L    CO2 32 (H) 23 - 29 mmol/L    Glucose 129 (H) 70 - 110 mg/dL    BUN 34 (H) 8 - 23 mg/dL    Creatinine 0.9 0.5 - 1.4 mg/dL    Calcium 8.6 (L) 8.7 - 10.5 mg/dL    Anion Gap 9 8 - 16 mmol/L    eGFR >60 >60 mL/min/1.73 m^2       Microbiology Results (last 7 days)       Procedure Component Value Units Date/Time    Culture, Respiratory with  "Gram Stain [[id number]]  (Abnormal)  (Susceptibility) Collected: 03/22/23 1528    Order Status: Completed Specimen: Respiratory from Endotracheal Aspirate Updated: 03/29/23 0715     Respiratory Culture STENOTROPHOMONAS (X.) MALTOPHILIA  Moderate        ACHROMOBACTER XYLOSOXIDANS SUBSP. DENTRIFICANS  Few        LZIETH GLABRATA  Few       Gram Stain (Respiratory) <10 epithelial cells per low power field.     Gram Stain (Respiratory) Few WBC's     Gram Stain (Respiratory) Rare Gram negative rods     Gram Stain (Respiratory) Rare yeast             Imaging Results              X-Ray Chest AP Portable (Final result)  Result time 03/18/23 12:01:13      Final result by Mariano Soto MD (03/18/23 12:01:13)                   Impression:      No detrimental change when compared with 12/29/2022.      Electronically signed by: Mariano Soto MD  Date:    03/18/2023  Time:    12:01               Narrative:    EXAMINATION:  XR CHEST AP PORTABLE    CLINICAL HISTORY:  Provided history is "chest pain;  ".    TECHNIQUE:  One view of the chest.    COMPARISON:  12/29/2022.    FINDINGS:  Tracheostomy tube is present with the tip overlying the tracheal air column between the clavicular heads and sherine.  Cardiac silhouette is borderline enlarged and similar to prior study.  Atherosclerotic calcifications overlie the aortic arch.  Probable pulmonary emphysema.  Coarsened interstitial lung markings with bibasilar subsegmental atelectasis.  Small left and trace right pleural effusions, similar to the prior study.  No pneumothorax.  Aeration is overall similar when compared with 12/29/2022. Probable percutaneous gastrostomy tube overlies the left upper quadrant of the abdomen.                                        "

## 2023-04-04 NOTE — ASSESSMENT & PLAN NOTE
-Diagnosed 12/15/21 via FNA; Underwent total glossectomy, bilateral neck dissection, bilateral cervical facial flaps and anterolateral thigh flap reconstruction of glossectomy defect; Completed adjuvant chemoradiation  - Followed by oncology and ENT outpt. No longer on chemo or radiation.   - has a trach since above treatment   - noted; management per hospital primary

## 2023-04-05 LAB
ALBUMIN SERPL BCP-MCNC: 2.3 G/DL (ref 3.5–5.2)
ALP SERPL-CCNC: 89 U/L (ref 55–135)
ALT SERPL W/O P-5'-P-CCNC: 18 U/L (ref 10–44)
ANION GAP SERPL CALC-SCNC: 9 MMOL/L (ref 8–16)
AST SERPL-CCNC: 29 U/L (ref 10–40)
BASOPHILS # BLD AUTO: 0.01 K/UL (ref 0–0.2)
BASOPHILS NFR BLD: 0.2 % (ref 0–1.9)
BILIRUB SERPL-MCNC: 1.4 MG/DL (ref 0.1–1)
BUN SERPL-MCNC: 27 MG/DL (ref 8–23)
CALCIUM SERPL-MCNC: 8.4 MG/DL (ref 8.7–10.5)
CHLORIDE SERPL-SCNC: 107 MMOL/L (ref 95–110)
CO2 SERPL-SCNC: 32 MMOL/L (ref 23–29)
CREAT SERPL-MCNC: 0.9 MG/DL (ref 0.5–1.4)
DIFFERENTIAL METHOD: ABNORMAL
EOSINOPHIL # BLD AUTO: 0.1 K/UL (ref 0–0.5)
EOSINOPHIL NFR BLD: 2 % (ref 0–8)
ERYTHROCYTE [DISTWIDTH] IN BLOOD BY AUTOMATED COUNT: 17.6 % (ref 11.5–14.5)
EST. GFR  (NO RACE VARIABLE): >60 ML/MIN/1.73 M^2
GLUCOSE SERPL-MCNC: 126 MG/DL (ref 70–110)
HCT VFR BLD AUTO: 28.4 % (ref 40–54)
HGB BLD-MCNC: 8.8 G/DL (ref 14–18)
IMM GRANULOCYTES # BLD AUTO: 0.02 K/UL (ref 0–0.04)
IMM GRANULOCYTES NFR BLD AUTO: 0.3 % (ref 0–0.5)
LYMPHOCYTES # BLD AUTO: 0.3 K/UL (ref 1–4.8)
LYMPHOCYTES NFR BLD: 4.7 % (ref 18–48)
MCH RBC QN AUTO: 29.8 PG (ref 27–31)
MCHC RBC AUTO-ENTMCNC: 31 G/DL (ref 32–36)
MCV RBC AUTO: 96 FL (ref 82–98)
MONOCYTES # BLD AUTO: 0.7 K/UL (ref 0.3–1)
MONOCYTES NFR BLD: 10.8 % (ref 4–15)
NEUTROPHILS # BLD AUTO: 4.9 K/UL (ref 1.8–7.7)
NEUTROPHILS NFR BLD: 82 % (ref 38–73)
NRBC BLD-RTO: 0 /100 WBC
PLATELET # BLD AUTO: 165 K/UL (ref 150–450)
PMV BLD AUTO: 9.7 FL (ref 9.2–12.9)
POCT GLUCOSE: 116 MG/DL (ref 70–110)
POCT GLUCOSE: 123 MG/DL (ref 70–110)
POCT GLUCOSE: 152 MG/DL (ref 70–110)
POTASSIUM SERPL-SCNC: 3.5 MMOL/L (ref 3.5–5.1)
PROT SERPL-MCNC: 5.7 G/DL (ref 6–8.4)
RBC # BLD AUTO: 2.95 M/UL (ref 4.6–6.2)
SODIUM SERPL-SCNC: 148 MMOL/L (ref 136–145)
WBC # BLD AUTO: 6.01 K/UL (ref 3.9–12.7)

## 2023-04-05 PROCEDURE — 27000221 HC OXYGEN, UP TO 24 HOURS

## 2023-04-05 PROCEDURE — 99497 ADVNCD CARE PLAN 30 MIN: CPT | Mod: ,,, | Performed by: REGISTERED NURSE

## 2023-04-05 PROCEDURE — 99498 ADVNCD CARE PLAN ADDL 30 MIN: CPT | Mod: ,,, | Performed by: REGISTERED NURSE

## 2023-04-05 PROCEDURE — 85025 COMPLETE CBC W/AUTO DIFF WBC: CPT | Performed by: HOSPITALIST

## 2023-04-05 PROCEDURE — 97530 THERAPEUTIC ACTIVITIES: CPT

## 2023-04-05 PROCEDURE — 25000003 PHARM REV CODE 250: Performed by: STUDENT IN AN ORGANIZED HEALTH CARE EDUCATION/TRAINING PROGRAM

## 2023-04-05 PROCEDURE — 25000242 PHARM REV CODE 250 ALT 637 W/ HCPCS: Performed by: STUDENT IN AN ORGANIZED HEALTH CARE EDUCATION/TRAINING PROGRAM

## 2023-04-05 PROCEDURE — 25000003 PHARM REV CODE 250: Performed by: HOSPITALIST

## 2023-04-05 PROCEDURE — 51798 US URINE CAPACITY MEASURE: CPT

## 2023-04-05 PROCEDURE — 25000003 PHARM REV CODE 250: Performed by: INTERNAL MEDICINE

## 2023-04-05 PROCEDURE — 25000003 PHARM REV CODE 250: Performed by: REGISTERED NURSE

## 2023-04-05 PROCEDURE — 99498 PR ADVNCD CARE PLAN ADDL 30 MIN: ICD-10-PCS | Mod: ,,, | Performed by: REGISTERED NURSE

## 2023-04-05 PROCEDURE — C9113 INJ PANTOPRAZOLE SODIUM, VIA: HCPCS | Performed by: STUDENT IN AN ORGANIZED HEALTH CARE EDUCATION/TRAINING PROGRAM

## 2023-04-05 PROCEDURE — 94760 N-INVAS EAR/PLS OXIMETRY 1: CPT

## 2023-04-05 PROCEDURE — 21400001 HC TELEMETRY ROOM

## 2023-04-05 PROCEDURE — 99497 PR ADVNCD CARE PLAN 30 MIN: ICD-10-PCS | Mod: ,,, | Performed by: REGISTERED NURSE

## 2023-04-05 PROCEDURE — 99900026 HC AIRWAY MAINTENANCE (STAT)

## 2023-04-05 PROCEDURE — 94640 AIRWAY INHALATION TREATMENT: CPT

## 2023-04-05 PROCEDURE — 99233 PR SUBSEQUENT HOSPITAL CARE,LEVL III: ICD-10-PCS | Mod: ,,, | Performed by: REGISTERED NURSE

## 2023-04-05 PROCEDURE — 63600175 PHARM REV CODE 636 W HCPCS: Performed by: STUDENT IN AN ORGANIZED HEALTH CARE EDUCATION/TRAINING PROGRAM

## 2023-04-05 PROCEDURE — 94761 N-INVAS EAR/PLS OXIMETRY MLT: CPT

## 2023-04-05 PROCEDURE — 99900035 HC TECH TIME PER 15 MIN (STAT)

## 2023-04-05 PROCEDURE — 97164 PT RE-EVAL EST PLAN CARE: CPT

## 2023-04-05 PROCEDURE — 99233 SBSQ HOSP IP/OBS HIGH 50: CPT | Mod: ,,, | Performed by: REGISTERED NURSE

## 2023-04-05 PROCEDURE — A4216 STERILE WATER/SALINE, 10 ML: HCPCS | Performed by: INTERNAL MEDICINE

## 2023-04-05 PROCEDURE — 97535 SELF CARE MNGMENT TRAINING: CPT

## 2023-04-05 PROCEDURE — 80053 COMPREHEN METABOLIC PANEL: CPT | Performed by: STUDENT IN AN ORGANIZED HEALTH CARE EDUCATION/TRAINING PROGRAM

## 2023-04-05 RX ADMIN — Medication 10 ML: at 01:04

## 2023-04-05 RX ADMIN — FERROUS SULFATE TAB 325 MG (65 MG ELEMENTAL FE) 1 EACH: 325 (65 FE) TAB at 08:04

## 2023-04-05 RX ADMIN — IPRATROPIUM BROMIDE AND ALBUTEROL SULFATE 3 ML: 2.5; .5 SOLUTION RESPIRATORY (INHALATION) at 08:04

## 2023-04-05 RX ADMIN — CLOPIDOGREL BISULFATE 75 MG: 75 TABLET ORAL at 08:04

## 2023-04-05 RX ADMIN — OXYCODONE HYDROCHLORIDE 5 MG: 5 TABLET ORAL at 08:04

## 2023-04-05 RX ADMIN — ASPIRIN 81 MG CHEWABLE TABLET 81 MG: 81 TABLET CHEWABLE at 08:04

## 2023-04-05 RX ADMIN — Medication 10 ML: at 08:04

## 2023-04-05 RX ADMIN — MELATONIN TAB 3 MG 9 MG: 3 TAB at 08:04

## 2023-04-05 RX ADMIN — HYDROXYZINE HYDROCHLORIDE 25 MG: 25 TABLET ORAL at 08:04

## 2023-04-05 RX ADMIN — METOPROLOL TARTRATE 25 MG: 25 TABLET, FILM COATED ORAL at 08:04

## 2023-04-05 RX ADMIN — PANTOPRAZOLE SODIUM 40 MG: 40 INJECTION, POWDER, FOR SOLUTION INTRAVENOUS at 08:04

## 2023-04-05 RX ADMIN — GLYCOPYRROLATE 1 MG: 1 TABLET ORAL at 08:04

## 2023-04-05 RX ADMIN — GUAIFENESIN 400 MG: 200 SOLUTION ORAL at 03:04

## 2023-04-05 RX ADMIN — ATORVASTATIN CALCIUM 80 MG: 40 TABLET, FILM COATED ORAL at 08:04

## 2023-04-05 RX ADMIN — Medication 10 ML: at 05:04

## 2023-04-05 RX ADMIN — GLYCOPYRROLATE 1 MG: 1 TABLET ORAL at 02:04

## 2023-04-05 RX ADMIN — LIDOCAINE 5% 2 PATCH: 700 PATCH TOPICAL at 01:04

## 2023-04-05 RX ADMIN — IPRATROPIUM BROMIDE AND ALBUTEROL SULFATE 3 ML: 2.5; .5 SOLUTION RESPIRATORY (INHALATION) at 07:04

## 2023-04-05 NOTE — NURSING
[facility]  Nurses Note -- 4 Eyes      4/5/2023       Skin assessed on: Q Shift      [x] No Pressure Injuries Present    []Prevention Measures Documented    [] Yes LDA  for Pressure Injury Previously documented     [] Yes New Pressure Injury Discovered   [] LDA for New Pressure Injury Added      Attending RN:  Diamante Coppola, RN     Second RN:  Zunilda

## 2023-04-05 NOTE — PROGRESS NOTES
[facility]  Palliative Medicine  Progress Note    Patient Name: Fareed Richard Jr.  MRN: [id number]  Admission Date: 3/18/2023  Hospital Length of Stay: 17 days  Code Status: Full Code   Attending Provider: Andry Zhao MD  Consulting Provider: Trupti Beck NP  Primary Care Physician: Kodi Tubbs MD  Principal Problem:Acute blood loss anemia    Patient information was obtained from patient, spouse/SO and primary team.      Assessment/Plan:   Advance Care Planning   Palliative Care  4/5/2023  - interval chart reviewed in detail; discussed pt during ICU MDT rounds; shared wife's GOC with MDT need for pt to be able to return to pre-admit physical abilities for wife to be able to safely care for him at home (walking with walked to restroom and small ADL activities while wife is at work); she works full time and 24/7 full care at home is not an option for her at this time   - pt reports much better mood and cooperation today (RN confirms); sitting in chair at bedside, has requested to use bedside commode, agreeable to working with therapy   - GOC re-discussed today; pt advocates willingness to cooperate with recommended treatments/therapies to allow for transition to rehab to improve strength/abilities to be able to return home  - discussion of his role in participating with therapy to achieve this GOC; recommend coordination of therapy visits with bedside RN to allow for hydroxyzine dosing as his anxiety is a large factor in participation   - discussed this plan for optimizing anxiety control for therapy and GOC with PT/OT, CM/SW, and bedside nurse   - called wife to share pt's improved cooperation today and to provide brief medical update; she confirms this GOC, with specific requests for [facility] rehab facility as pt has been there twice before with good outcomes and pt is very comfortable there   - emotional support provided to pt and wife during all interactions; ongoing encouragement to pt  throughout the day; all questions/concerns addressed     4/4/2023  - interval chart reviewed in detail; discussed pt during ICU MDT rounds   - called pt's wife, Bridgett, to discuss continued admission, pt's baseline prior to admission, and her goals prior to discharge as pt has begun to exhibit frustration with nursing care per bedside nurses (ex. Not allowing for cleaning post BM incontinence)  - Bridgett expressed frustration with pt's behavior towards nursing staff and overall at this time; she shared her concerns for her ability to care for pt at home given current status; prior to admit pt was able to walk to restroom and other small ADLs with use of walker, he was also continent to bowel and bladder; she needs him to be closer to pre-admit baseline before he can come home   - later met with pt and shared wife's concerns and goals that need to occur for him to be able to go home; discussed importance of participating with PT (if BP/oxygenation allows) and to allow for necessary medical and nursing care; discussed risks with care he is refusing or not actively participating in  - validated pt's frustration with long admit and the acuteness/frequency of his care; assured nurses are grouping care as much as possible to allow for rest but he is very sick and requires a lot of care   - pt continues to advocate that he wishes for full treatment and full code; explained this requires his compliance with treatment/care plans; but if at any point he wishes to stop aggressive measures/treatment and discuss measures to improve comfort and quality of life we can discuss   - emotional support provided to pt and wife during interactions; allowed time for questions/concerns   - wife plans to visit this afternoon to discuss GOC with pt; she is requesting psych consult and concerned about pt's mental decline and ability to make safe medical decisions    - updated MDT; ongoing communication     (See previous ACP notes 3/22 (consult),  3/23, 3/27, 3/28)     ENT  Tracheostomy present  - was initially admitted with significant bleeding to trach site, an additional episode during admission; now resolved  - pt has frustrations with limited ability to verbalize/communicate; can become frustrated/anxious/dyspneic at times related to this; writes in a notebook often to support communication   - noted; management per hospital primary    Pulmonary  Hemoptysis  - see Trach   - noted; management per hospital primary     Acute on chronic respiratory failure with hypoxia  - trach  - respiratory status greatly affected during periods of anxiety   - noted; management per hospital primary and [facility]     Cardiac/Vascular  Coronary artery disease involving native coronary artery of native heart with unstable angina pectoris  - see NSTEMI   - noted; management per hospital primary, and Cardiology     Oncology  * Acute blood loss anemia  - multiple bleeding events since admit; trach hemoptysis on admit, and another episode during admit; retroperitoneal hematoma 3/28  - pt now with black stools; wife requesting GI evaluation as pt had similar dark stools 9/22 and refused 2nd bowel prep after failed to clear for first bowel prep for scope while inpatient (discussed with Dr. Zhao)   - noted; management per hospital primary     Squamous cell cancer of tongue  - Diagnosed 12/15/21 via FNA; Underwent total glossectomy, bilateral neck dissection, bilateral cervical facial flaps and anterolateral thigh flap reconstruction of glossectomy defect; completed adjuvant chemoradiation  - Followed by oncology and ENT outpt. No longer on chemo or radiation.   - has a trach since above treatment   - noted; management per hospital primary     GI  PEG (percutaneous endoscopic gastrostomy) status  - PEG for feeding and meds; tube feeds typically well tolerated and no difficulty with home management by wife   - resumed continuous feeds on 4/4 per pt's request     - wife concerned of  "condition of PEG tube; original PEG tube in place; wife requesting GI consult to discuss while inpatient incase exchange is needed   - noted; management per hospital primary     I will follow-up with patient. Please contact us if you have any additional questions.     Total visit time: 90 minutes    > 50% of  35  min visit spent in chart review, face to face discussion of symptom assessment, coordination of care with other specialists, documentation, and discharge planning.    55 min ACP time spent: goals of care, emotional support, formulating and communicating prognosis, exploring burden/ benefit of various approaches of treatment.     Trupti Beck NP  Palliative Medicine  [facility] - Intensive Care    Subjective:     Chief Complaint:   Chief Complaint   Patient presents with    Hemoptysis     Ems called to 72yo male that wife noticed was having blood y sputum in his trach on Wednesday. Went thursdsay to have a trach change and the dr was unale to scope his mouth above the trach due to him gagging but did change the trach. Continued to have the pink sputum until 0300 today and it had bright red blood from trach. Denied any pain or SOB     HPI:   From H&P: " Fareed Richard Jr. 73 y.o. 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  Per his wife 4 days ago he began to have pink tinged sputum via his trach.  He was seen by his ENT 2 days ago with a scope exam and a trach exchange without evidence of bleeding.  This morning at 3:00 a.m. he developed bright red blood via trach which resolved without intervention.  It is since not recurred.  He takes a daily aspirin.  He receives all medications via G-tube.  His tube feeding regimen consists of 6 cans of Isosource daily with 120 cc free water boluses.  He denies fever chest pain shortness of breath nausea vomiting abdominal pain leg swelling " "syncope dizziness dysuria melena hematuria hematemesis.     In the ED, hemoglobin 7.6 INR 1.0 BUN 50 creatinine 0.7  troponin negative BRCA negative O positive type and screen chest x-ray without detrimental change hypotensive to 85/54."     Palliative medicine consulted for goals of care and advance care planning; Met with pt at bedside; for details of visit, see advance care planning section of plan.       Objective:     Vital Signs (Most Recent):  Temp: 97.9 °F (36.6 °C) (04/05/23 1100)  Pulse: 90 (04/05/23 1230)  Resp: (!) 32 (04/05/23 1230)  BP: 121/65 (04/05/23 1230)  SpO2: (!) 89 % (04/05/23 1230)   Vital Signs (24h Range):  Temp:  [97.8 °F (36.6 °C)-98.9 °F (37.2 °C)] 97.9 °F (36.6 °C)  Pulse:  [] 90  Resp:  [20-46] 32  SpO2:  [70 %-100 %] 89 %  BP: ()/() 121/65     Weight: 97.8 kg (215 lb 9.8 oz)  Body mass index is 32.78 kg/m².    Physical Exam  Constitutional:       General: He is sleeping. He is not in acute distress.     Appearance: He is ill-appearing.   HENT:      Head: Atraumatic.      Comments: History of facial/oral reconstruction   Pulmonary:      Effort: Pulmonary effort is normal.      Comments: Trach  Abdominal:      Comments: PEG   Musculoskeletal:      Right lower leg: No edema.      Left lower leg: No edema.   Neurological:      Mental Status: He is oriented to person, place, and time. He is lethargic.      Comments: Sleeping; arousable/follows commands    Psychiatric:      Comments: Frustration with difficulties with communication; improved agitation and cooperation      Advance Care Planning   Advance Directives:   Living Will: No    LaPOST: No    Do Not Resuscitate Status: No    Medical Power of : No      Decision Making:  Patient answered questions and Family answered questions  Goals of Care: What is most important right now is to focus on spending time at home, remaining as independent as possible, symptom/pain control, curative/life-prolongation " (regardless of treatment burdens). Accordingly, we have decided that the best plan to meet the patient's goals includes continuing with treatment.     Significant Labs: All pertinent labs within the past 24 hours have been reviewed.    CBC:   Recent Labs   Lab 04/05/23 0329   WBC 6.01   HGB 8.8*   HCT 28.4*   MCV 96          BMP:  Recent Labs   Lab 04/05/23 0329   *   *   K 3.5      CO2 32*   BUN 27*   CREATININE 0.9   CALCIUM 8.4*       LFT:  Lab Results   Component Value Date    AST 29 04/05/2023    ALKPHOS 89 04/05/2023    BILITOT 1.4 (H) 04/05/2023     Albumin:   Albumin   Date Value Ref Range Status   04/05/2023 2.3 (L) 3.5 - 5.2 g/dL Final     Protein:   Total Protein   Date Value Ref Range Status   04/05/2023 5.7 (L) 6.0 - 8.4 g/dL Final     Lactic acid:   Lab Results   Component Value Date    LACTATE 1.8 03/19/2023    LACTATE 0.8 05/14/2022       Significant Imaging: I have reviewed all pertinent imaging results/findings within the past 24 hours.

## 2023-04-05 NOTE — ASSESSMENT & PLAN NOTE
- Diagnosed 12/15/21 via FNA; Underwent total glossectomy, bilateral neck dissection, bilateral cervical facial flaps and anterolateral thigh flap reconstruction of glossectomy defect; completed adjuvant chemoradiation  - Followed by oncology and ENT outpt. No longer on chemo or radiation.   - has a trach since above treatment   - noted; management per hospital primary

## 2023-04-05 NOTE — SUBJECTIVE & OBJECTIVE
Interval History:   No events overnight.   Became a bit more cooperative  Hb up and BP up    Review of Systems   Constitutional:  Positive for activity change and fatigue.   Cardiovascular:  Negative for chest pain.   Gastrointestinal:  Negative for abdominal pain.     Objective:     Vital Signs (Most Recent):  Temp: 98.1 °F (36.7 °C) (04/05/23 0400)  Pulse: 97 (04/05/23 0727)  Resp: (!) 26 (04/05/23 0727)  BP: (!) 142/80 (04/05/23 0600)  SpO2: 97 % (04/05/23 0727)   Vital Signs (24h Range):  Temp:  [97.8 °F (36.6 °C)-98.9 °F (37.2 °C)] 98.1 °F (36.7 °C)  Pulse:  [] 97  Resp:  [20-46] 26  SpO2:  [70 %-100 %] 97 %  BP: ()/() 142/80     Weight: 97.3 kg (214 lb 8.1 oz)  Body mass index is 32.62 kg/m².    Intake/Output Summary (Last 24 hours) at 4/5/2023 0842  Last data filed at 4/5/2023 0554  Gross per 24 hour   Intake 1738.91 ml   Output 696 ml   Net 1042.91 ml        Physical Exam  Vitals reviewed.   Constitutional:       General: He is not in acute distress.     Appearance: He is well-developed and normal weight. He is ill-appearing. He is not toxic-appearing or diaphoretic.   HENT:      Head: Normocephalic and atraumatic.   Eyes:      General: No scleral icterus.     Pupils: Pupils are equal, round, and reactive to light.   Neck:      Thyroid: No thyromegaly.   Cardiovascular:      Rate and Rhythm: Normal rate and regular rhythm.      Heart sounds: No murmur heard.    No gallop.   Pulmonary:      Effort: Pulmonary effort is normal. No respiratory distress.      Breath sounds: Normal breath sounds. No stridor.   Abdominal:      General: Bowel sounds are normal. There is no distension.      Palpations: Abdomen is soft.      Tenderness: There is no abdominal tenderness.   Musculoskeletal:         General: No deformity. Normal range of motion.      Cervical back: Normal range of motion.   Skin:     General: Skin is warm.      Capillary Refill: Capillary refill takes less than 2 seconds.       Coloration: Skin is not jaundiced.      Findings: No bruising.   Neurological:      Mental Status: He is alert and oriented to person, place, and time.      Motor: Weakness present.      Gait: Gait abnormal.   Psychiatric:         Mood and Affect: Mood normal.         Behavior: Behavior normal.           Recent Results (from the past 24 hour(s))   POCT glucose    Collection Time: 04/04/23  1:19 PM   Result Value Ref Range    POCT Glucose 215 (H) 70 - 110 mg/dL   Prepare RBC 1 Unit    Collection Time: 04/04/23  1:21 PM   Result Value Ref Range    UNIT NUMBER [id number]     Product Code [id number]     DISPENSE STATUS TRANSFUSED     CODING SYSTEM RUGG836     Unit Blood Type Code 5100     Unit Blood Type O POS     Unit Expiration [id number]     CROSSMATCH INTERPRETATION Compatible    Type & Screen    Collection Time: 04/04/23  1:21 PM   Result Value Ref Range    Group & Rh O POS     Indirect Natalya NEG     Specimen Outdate 04/07/2023 23:59    POCT glucose    Collection Time: 04/04/23  5:26 PM   Result Value Ref Range    POCT Glucose 100 70 - 110 mg/dL   CBC Auto Differential    Collection Time: 04/05/23  3:29 AM   Result Value Ref Range    WBC 6.01 3.90 - 12.70 K/uL    RBC 2.95 (L) 4.60 - 6.20 M/uL    Hemoglobin 8.8 (L) 14.0 - 18.0 g/dL    Hematocrit 28.4 (L) 40.0 - 54.0 %    MCV 96 82 - 98 fL    MCH 29.8 27.0 - 31.0 pg    MCHC 31.0 (L) 32.0 - 36.0 g/dL    RDW 17.6 (H) 11.5 - 14.5 %    Platelets 165 150 - 450 K/uL    MPV 9.7 9.2 - 12.9 fL    Immature Granulocytes 0.3 0.0 - 0.5 %    Gran # (ANC) 4.9 1.8 - 7.7 K/uL    Immature Grans (Abs) 0.02 0.00 - 0.04 K/uL    Lymph # 0.3 (L) 1.0 - 4.8 K/uL    Mono # 0.7 0.3 - 1.0 K/uL    Eos # 0.1 0.0 - 0.5 K/uL    Baso # 0.01 0.00 - 0.20 K/uL    nRBC 0 0 /100 WBC    Gran % 82.0 (H) 38.0 - 73.0 %    Lymph % 4.7 (L) 18.0 - 48.0 %    Mono % 10.8 4.0 - 15.0 %    Eosinophil % 2.0 0.0 - 8.0 %    Basophil % 0.2 0.0 - 1.9 %    Differential Method Automated    Comprehensive Metabolic Panel  "   Collection Time: 04/05/23  3:29 AM   Result Value Ref Range    Sodium 148 (H) 136 - 145 mmol/L    Potassium 3.5 3.5 - 5.1 mmol/L    Chloride 107 95 - 110 mmol/L    CO2 32 (H) 23 - 29 mmol/L    Glucose 126 (H) 70 - 110 mg/dL    BUN 27 (H) 8 - 23 mg/dL    Creatinine 0.9 0.5 - 1.4 mg/dL    Calcium 8.4 (L) 8.7 - 10.5 mg/dL    Total Protein 5.7 (L) 6.0 - 8.4 g/dL    Albumin 2.3 (L) 3.5 - 5.2 g/dL    Total Bilirubin 1.4 (H) 0.1 - 1.0 mg/dL    Alkaline Phosphatase 89 55 - 135 U/L    AST 29 10 - 40 U/L    ALT 18 10 - 44 U/L    Anion Gap 9 8 - 16 mmol/L    eGFR >60 >60 mL/min/1.73 m^2   POCT glucose    Collection Time: 04/05/23  7:38 AM   Result Value Ref Range    POCT Glucose 116 (H) 70 - 110 mg/dL       Microbiology Results (last 7 days)       ** No results found for the last 168 hours. **             Imaging Results              X-Ray Chest AP Portable (Final result)  Result time 03/18/23 12:01:13      Final result by Mariano Soto MD (03/18/23 12:01:13)                   Impression:      No detrimental change when compared with 12/29/2022.      Electronically signed by: Mariano Soto MD  Date:    03/18/2023  Time:    12:01               Narrative:    EXAMINATION:  XR CHEST AP PORTABLE    CLINICAL HISTORY:  Provided history is "chest pain;  ".    TECHNIQUE:  One view of the chest.    COMPARISON:  12/29/2022.    FINDINGS:  Tracheostomy tube is present with the tip overlying the tracheal air column between the clavicular heads and sherine.  Cardiac silhouette is borderline enlarged and similar to prior study.  Atherosclerotic calcifications overlie the aortic arch.  Probable pulmonary emphysema.  Coarsened interstitial lung markings with bibasilar subsegmental atelectasis.  Small left and trace right pleural effusions, similar to the prior study.  No pneumothorax.  Aeration is overall similar when compared with 12/29/2022. Probable percutaneous gastrostomy tube overlies the left upper quadrant of the abdomen.      "

## 2023-04-05 NOTE — PLAN OF CARE
Problem: Occupational Therapy  Goal: Occupational Therapy Goal  Description: Goals to be met by: 4/12/23     Patient will increase functional independence with ADLs by performing:    UE Dressing with Modified Gillespie and Supervision.  LE Dressing with Modified Gillespie and Supervision.  Grooming while standing at sink with Stand-by Assistance and Assistive Devices as needed.  Toileting from toilet with Modified Gillespie, Supervision, and Assistive Devices as needed for hygiene and clothing management.   Sitting at edge of bed x30 minutes with Modified Gillespie and Supervision.  Rolling to Bilateral with Modified Gillespie.   Supine to sit with Modified Gillespie.  Step transfer with Modified Gillespie and Supervision  Toilet transfer to toilet with Supervision.  Upper extremity exercise program x10 reps per handout, with assistance as needed.    Outcome: Ongoing, Progressing

## 2023-04-05 NOTE — ASSESSMENT & PLAN NOTE
- see Trach   - noted; management per hospital primary    When Outside In The Sun, Do You...: mostly tans, rarely burns Additional History: Vitamin deficiency checked and resolved

## 2023-04-05 NOTE — SUBJECTIVE & OBJECTIVE
Past Medical History:   Diagnosis Date    Cancer     skin to Rt forearm and face    COPD (chronic obstructive pulmonary disease)     Hyperlipidemia     Hypertension     Pseudoaneurysm      Past Surgical History:   Procedure Laterality Date    ABDOMINAL SURGERY      stents placed in liver and large intestines, per patient    ANGIOGRAPHY OF AORTIC ARCH  3/27/2023    Procedure: Aortogram, Aortic Arch;  Surgeon: Tim Bhatt MD;  Location: [facility] CATH LAB;  Service: Cardiology;;    AORTOGRAPHY WITH SERIALOGRAPHY N/A 3/27/2023    Procedure: AORTOGRAM, WITH SERIALOGRAPHY;  Surgeon: Tim Bhatt MD;  Location: [facility] CATH LAB;  Service: Cardiology;  Laterality: N/A;    CAROTID STENT      COLONOSCOPY N/A 09/27/2022    Procedure: COLONOSCOPY;  Surgeon: Donnie Peterson MD;  Location[facility]O [facility]  Service: Endoscopy;  Laterality: N/A;    COLONOSCOPY N/A 09/30/2022    Procedure: COLONOSCOPY;  Surgeon: Joy Cabrera MD;  [facility]  Service: Endoscopy;  Laterality: N/A;    CORONARY STENT PLACEMENT  01/2000    DISSECTION OF NECK Bilateral 01/04/2022    Procedure: DISSECTION, NECK;  Surgeon: Naeem Smalls MD;  [facility];  Service: ENT;  Laterality: Bilateral;    ESOPHAGOGASTRODUODENOSCOPY N/A 09/27/2022    Procedure: EGD (ESOPHAGOGASTRODUODENOSCOPY);  Surgeon: Donnie Peterson MD;  Location[facility]O [facility]  Service: Endoscopy;  Laterality: N/A;    ESOPHAGOGASTRODUODENOSCOPY N/A 09/30/2022    Procedure: EGD (ESOPHAGOGASTRODUODENOSCOPY);  Surgeon: oJy Cabrera MD;  Location: [facility] ENDO (2ND FLR);  Service: Endoscopy;  Laterality: N/A;    ESOPHAGOGASTRODUODENOSCOPY W/ PEG N/A 01/04/2022    Procedure: EGD, WITH PEG TUBE INSERTION;  Surgeon: Anthony Calabrese MD;  [facility];  Service: General;  Laterality: N/A;    EYE SURGERY      Cataract bilateral    femoral stents      bilateral    FLAP PROCEDURE N/A 01/03/2022    Procedure: CREATION, FREE FLAP;   Surgeon: Naeem Smalls MD;  Location: [facility] OR [facility] FLR;  Service: ENT;  Laterality: N/A;    FLAP PROCEDURE Right 01/04/2022    Procedure: CREATION, FREE FLAP;  Surgeon: Stacey Conde MD;  Location: [facility] OR 2ND FLR;  Service: ENT;  Laterality: Right;  Ischemic start 1203  Ischemic stop 1353    GLOSSECTOMY N/A 01/04/2022    Procedure: GLOSSECTOMY;  Surgeon: Naeem Smalls MD;  Location: [facility] OR 2ND FLR;  Service: ENT;  Laterality: N/A;    LEFT HEART CATHETERIZATION Left 3/23/2023    Procedure: Left heart cath;  Surgeon: Tacos Benitez MD;  Location: [facility] CATH LAB;  Service: Cardiology;  Laterality: Left;    PERCUTANEOUS TRANSLUMINAL BALLOON ANGIOPLASTY OF CORONARY ARTERY Left 3/27/2023    Procedure: Angioplasty-coronary;  Surgeon: Tim Bhatt MD;  Location: [facility] CATH LAB;  Service: Cardiology;  Laterality: Left;  not before 9am, R rad access, 6 Fr EBU 3.5 guide    RADICAL NECK DISSECTION Left 01/03/2022    Procedure: DISSECTION, NECK, RADICAL;  Surgeon: Naeem Smalls MD;  [facility];  Service: ENT;  Laterality: Left;    SKIN BIOPSY      SKIN CANCER EXCISION      STENT, CORONARY, MULTI VESSEL  3/27/2023    Procedure: Stent, Coronary, Multi Vessel;  Surgeon: Tim Bhatt MD;  Location: [facility] CATH LAB;  Service: Cardiology;;    TRACHEOTOMY N/A 01/04/2022    Procedure: TRACHEOTOMY;  Surgeon: Naeem Smalls MD;  [facility];  Service: ENT;  Laterality: N/A;    VASCULAR SURGERY       Review of patient's allergies indicates:   Allergen Reactions    Ativan [lorazepam] Anxiety       Medications:  Continuous Infusions:   nitroGLYCERIN       Scheduled Meds:   albuterol-ipratropium  3 mL Nebulization Q12H    aspirin  81 mg Oral Daily    atorvastatin  80 mg Per G Tube Daily    clopidogreL  75 mg Oral Daily    ferrous sulfate  1 tablet Per G Tube Daily    glycopyrrolate  1 mg Per G Tube TID    LIDOcaine  2 patch Transdermal Q24H    melatonin  9 mg Per G Tube  Nightly    metoprolol tartrate  25 mg Per G Tube BID    pantoprazole  40 mg Intravenous Daily    sodium chloride 0.9%  10 mL Intravenous Q6H     PRN Meds:sodium chloride, sodium chloride, acetaminophen, acetaminophen, acetaminophen, atropine, bisacodyL, fentaNYL, guaiFENesin 100 mg/5 ml, hydrOXYzine HCL, insulin aspart U-100, morphine, naloxone, nitroGLYCERIN, nitroGLYCERIN, ondansetron, ondansetron, oxyCODONE, phenyleph-min oil-petrolatum, sodium chloride 0.9%, Flushing PICC Protocol **AND** sodium chloride 0.9% **AND** sodium chloride 0.9%    Family History    None       Tobacco Use    Smoking status: Former     Packs/day: 2.00     Years: 40.00     Pack years: 80.00     Types: Cigarettes     Start date: 1963     Quit date: 2018     Years since quittin.9    Smokeless tobacco: Never    Tobacco comments:     3/3 ppd x 40 yrs. Currently 3-4 cigarettes daily .He is trying  to quit. Is using a Vapor cigarettes  2-3 x's a day.   Substance and Sexual Activity    Alcohol use: Not Currently    Drug use: No    Sexual activity: Not Currently       Objective:     Vital Signs (Most Recent):  Temp: 97.9 °F (36.6 °C) (23 1100)  Pulse: 90 (23 1230)  Resp: (!) 32 (23 1230)  BP: 121/65 (23 1230)  SpO2: (!) 89 % (23 1230)   Vital Signs (24h Range):  Temp:  [97.8 °F (36.6 °C)-98.9 °F (37.2 °C)] 97.9 °F (36.6 °C)  Pulse:  [] 90  Resp:  [20-46] 32  SpO2:  [70 %-100 %] 89 %  BP: ()/() 121/65     Weight: 97.8 kg (215 lb 9.8 oz)  Body mass index is 32.78 kg/m².    Physical Exam  Constitutional:       General: He is sleeping. He is not in acute distress.     Appearance: He is ill-appearing.   HENT:      Head: Atraumatic.      Comments: History of facial/oral reconstruction   Pulmonary:      Effort: Pulmonary effort is normal.      Comments: Trach  Abdominal:      Comments: PEG   Musculoskeletal:      Right lower leg: No edema.      Left lower leg: No edema.   Neurological:       Mental Status: He is oriented to person, place, and time. He is lethargic.      Comments: Sleeping; arousable/follows commands    Psychiatric:      Comments: Frustration with difficulties with communication; improved agitation and cooperation      Advance Care Planning   Advance Directives:   Living Will: No    LaPOST: No    Do Not Resuscitate Status: No    Medical Power of : No      Decision Making:  Patient answered questions and Family answered questions  Goals of Care: What is most important right now is to focus on spending time at home, remaining as independent as possible, symptom/pain control, curative/life-prolongation (regardless of treatment burdens). Accordingly, we have decided that the best plan to meet the patient's goals includes continuing with treatment.       Significant Labs: All pertinent labs within the past 24 hours have been reviewed.  CBC:   Recent Labs   Lab 04/05/23 0329   WBC 6.01   HGB 8.8*   HCT 28.4*   MCV 96          BMP:  Recent Labs   Lab 04/05/23 0329   *   *   K 3.5      CO2 32*   BUN 27*   CREATININE 0.9   CALCIUM 8.4*       LFT:  Lab Results   Component Value Date    AST 29 04/05/2023    ALKPHOS 89 04/05/2023    BILITOT 1.4 (H) 04/05/2023     Albumin:   Albumin   Date Value Ref Range Status   04/05/2023 2.3 (L) 3.5 - 5.2 g/dL Final     Protein:   Total Protein   Date Value Ref Range Status   04/05/2023 5.7 (L) 6.0 - 8.4 g/dL Final     Lactic acid:   Lab Results   Component Value Date    LACTATE 1.8 03/19/2023    LACTATE 0.8 05/14/2022       Significant Imaging: I have reviewed all pertinent imaging results/findings within the past 24 hours.

## 2023-04-05 NOTE — PROGRESS NOTES
[facility]  Progress Note    Patient Name: Fareed Richard Jr.  MRN: [id number]  Patient Class: IP- Inpatient   Admission Date: 3/18/2023  Length of Stay: 17 days  Attending Physician: Andry Zhao MD  Primary Care Provider: Kodi Tubbs MD        Subjective:     Principal Problem:Acute blood loss anemia        HPI:  Fareed Richard Jr. 73 y.o. 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  Per his wife 4 days ago he began to have pink tinged sputum via his trach.  He was seen by his ENT 2 days ago with a scope exam and a trach exchange without evidence of bleeding.  This morning at 3:00 a.m. he developed bright red blood via trach which resolved without intervention.  It is since not recurred.  He takes a daily aspirin.  He receives all medications via G-tube.  His tube feeding regimen consists of 6 cans of Isosource daily with 120 cc free water boluses.  He denies fever chest pain shortness of breath nausea vomiting abdominal pain leg swelling syncope dizziness dysuria melena hematuria hematemesis.    In the ED, hemoglobin 7.6 INR 1.0 BUN 50 creatinine 0.7  troponin negative BRCA negative O positive type and screen chest x-ray without detrimental change hypotensive to 85/54.      Overview/Hospital Course:  72 yo M w squamous CA of tongue s/p glossectomy and reconstructive flap with trach, CAD, chronic hypoxic respiratory failure (on 5L at home) and with peg presented for eval of hemoptysis 2 days after trach change in clinic.  Transferred to ICU 3/19 when markedly hypotensive.  Unclear if due to hemorrhage, cardiogenic or septic shock.  Did require PRBC and TXA nebs but didn't seem like sufficient bleeding to cause shock.  Bleeding has stopped and ENT is on board and following.  CTA shows either significant mucus plugging or bibasilar pneumonia -  pulmonology favored pneumonia.  Also has urine culture growing Enterococcus.  He is on broad spectrum antibiotics. Troponin checked due to hypotension and it was 1.17.  Cards consulted and feel it's demand due to ischemia and hypotension and recommend outpatient stress. AM cortisol was low so ordered cosyntropin stim test which shows an adequate adrenal response to r/o adrenal insufficiency.  Developed severe shoulder pain and hypoxia, increasing troponin and pulmonary edema on CXR - shoulder pain likely anginal equivalent. Discussed with Cardiology, started heparin and nitro drip. Required nitro drip for chest pain. Tolerating heparin drip without evidence of further tracheostomy bleeding. Further discussions with Cardiology, and brought patient for LHC/angiogram on 3/23 which showed distal LM 30%, mild LAD 90% bifurcation lesion with D1, Cx mid 80%, RCA moderate diffuse disease with long 70% and some left to right collateral. All vessels heavily calcified. Not a candidate for CABG but plan for staged PCI. Continues on heparin drip, asa/plavix and tolerating. Off pressor support/nitro drip. No further bleeding and Hb remains stable. Had episodes of hypoxia/respiratory distress after vomiting and was intermittently placed on ventilator -  now back to trach collar close to home O2 requirements. Plan for angiogram/PCI on 3/27 with Dr. Bhatt.    Pt went for LHC w/ PCI to LAD and Lcx. Post operatively he was hemodynamically unstable and anemic. Stat CTA showed RP hematoma near L kidney without acute bleeding. Pt transfused supportively. He improved over the next few days, however still required periodic transfusion. On 3/31 he started coughing up blood from his trach and became hypotensive/tachycardic.    Given Hypotension of 86/51 and Hb goal 8-9 with NSTEMI/stent placement, another unit was given. Hb altagracia appropriately and BP now 142/80. Stable for step down or placement.       Interval History:   No events  overnight.   Became a bit more cooperative  Hb up and BP up    Review of Systems   Constitutional:  Positive for activity change and fatigue.   Cardiovascular:  Negative for chest pain.   Gastrointestinal:  Negative for abdominal pain.     Objective:     Vital Signs (Most Recent):  Temp: 98.1 °F (36.7 °C) (04/05/23 0400)  Pulse: 97 (04/05/23 0727)  Resp: (!) 26 (04/05/23 0727)  BP: (!) 142/80 (04/05/23 0600)  SpO2: 97 % (04/05/23 0727)   Vital Signs (24h Range):  Temp:  [97.8 °F (36.6 °C)-98.9 °F (37.2 °C)] 98.1 °F (36.7 °C)  Pulse:  [] 97  Resp:  [20-46] 26  SpO2:  [70 %-100 %] 97 %  BP: ()/() 142/80     Weight: 97.3 kg (214 lb 8.1 oz)  Body mass index is 32.62 kg/m².    Intake/Output Summary (Last 24 hours) at 4/5/2023 0842  Last data filed at 4/5/2023 0554  Gross per 24 hour   Intake 1738.91 ml   Output 696 ml   Net 1042.91 ml        Physical Exam  Vitals reviewed.   Constitutional:       General: He is not in acute distress.     Appearance: He is well-developed and normal weight. He is ill-appearing. He is not toxic-appearing or diaphoretic.   HENT:      Head: Normocephalic and atraumatic.   Eyes:      General: No scleral icterus.     Pupils: Pupils are equal, round, and reactive to light.   Neck:      Thyroid: No thyromegaly.   Cardiovascular:      Rate and Rhythm: Normal rate and regular rhythm.      Heart sounds: No murmur heard.    No gallop.   Pulmonary:      Effort: Pulmonary effort is normal. No respiratory distress.      Breath sounds: Normal breath sounds. No stridor.   Abdominal:      General: Bowel sounds are normal. There is no distension.      Palpations: Abdomen is soft.      Tenderness: There is no abdominal tenderness.   Musculoskeletal:         General: No deformity. Normal range of motion.      Cervical back: Normal range of motion.   Skin:     General: Skin is warm.      Capillary Refill: Capillary refill takes less than 2 seconds.      Coloration: Skin is not jaundiced.       Findings: No bruising.   Neurological:      Mental Status: He is alert and oriented to person, place, and time.      Motor: Weakness present.      Gait: Gait abnormal.   Psychiatric:         Mood and Affect: Mood normal.         Behavior: Behavior normal.           Recent Results (from the past 24 hour(s))   POCT glucose    Collection Time: 04/04/23  1:19 PM   Result Value Ref Range    POCT Glucose 215 (H) 70 - 110 mg/dL   Prepare RBC 1 Unit    Collection Time: 04/04/23  1:21 PM   Result Value Ref Range    UNIT NUMBER [id number]     Product Code [id number]     DISPENSE STATUS TRANSFUSED     CODING SYSTEM QSSK098     Unit Blood Type Code 5100     Unit Blood Type O POS     Unit Expiration [id number]     CROSSMATCH INTERPRETATION Compatible    Type & Screen    Collection Time: 04/04/23  1:21 PM   Result Value Ref Range    Group & Rh O POS     Indirect Natalya NEG     Specimen Outdate 04/07/2023 23:59    POCT glucose    Collection Time: 04/04/23  5:26 PM   Result Value Ref Range    POCT Glucose 100 70 - 110 mg/dL   CBC Auto Differential    Collection Time: 04/05/23  3:29 AM   Result Value Ref Range    WBC 6.01 3.90 - 12.70 K/uL    RBC 2.95 (L) 4.60 - 6.20 M/uL    Hemoglobin 8.8 (L) 14.0 - 18.0 g/dL    Hematocrit 28.4 (L) 40.0 - 54.0 %    MCV 96 82 - 98 fL    MCH 29.8 27.0 - 31.0 pg    MCHC 31.0 (L) 32.0 - 36.0 g/dL    RDW 17.6 (H) 11.5 - 14.5 %    Platelets 165 150 - 450 K/uL    MPV 9.7 9.2 - 12.9 fL    Immature Granulocytes 0.3 0.0 - 0.5 %    Gran # (ANC) 4.9 1.8 - 7.7 K/uL    Immature Grans (Abs) 0.02 0.00 - 0.04 K/uL    Lymph # 0.3 (L) 1.0 - 4.8 K/uL    Mono # 0.7 0.3 - 1.0 K/uL    Eos # 0.1 0.0 - 0.5 K/uL    Baso # 0.01 0.00 - 0.20 K/uL    nRBC 0 0 /100 WBC    Gran % 82.0 (H) 38.0 - 73.0 %    Lymph % 4.7 (L) 18.0 - 48.0 %    Mono % 10.8 4.0 - 15.0 %    Eosinophil % 2.0 0.0 - 8.0 %    Basophil % 0.2 0.0 - 1.9 %    Differential Method Automated    Comprehensive Metabolic Panel    Collection Time: 04/05/23  3:29 AM  "  Result Value Ref Range    Sodium 148 (H) 136 - 145 mmol/L    Potassium 3.5 3.5 - 5.1 mmol/L    Chloride 107 95 - 110 mmol/L    CO2 32 (H) 23 - 29 mmol/L    Glucose 126 (H) 70 - 110 mg/dL    BUN 27 (H) 8 - 23 mg/dL    Creatinine 0.9 0.5 - 1.4 mg/dL    Calcium 8.4 (L) 8.7 - 10.5 mg/dL    Total Protein 5.7 (L) 6.0 - 8.4 g/dL    Albumin 2.3 (L) 3.5 - 5.2 g/dL    Total Bilirubin 1.4 (H) 0.1 - 1.0 mg/dL    Alkaline Phosphatase 89 55 - 135 U/L    AST 29 10 - 40 U/L    ALT 18 10 - 44 U/L    Anion Gap 9 8 - 16 mmol/L    eGFR >60 >60 mL/min/1.73 m^2   POCT glucose    Collection Time: 04/05/23  7:38 AM   Result Value Ref Range    POCT Glucose 116 (H) 70 - 110 mg/dL       Microbiology Results (last 7 days)       ** No results found for the last 168 hours. **             Imaging Results              X-Ray Chest AP Portable (Final result)  Result time 03/18/23 12:01:13      Final result by Mariano Soto MD (03/18/23 12:01:13)                   Impression:      No detrimental change when compared with 12/29/2022.      Electronically signed by: Mariano Soto MD  Date:    03/18/2023  Time:    12:01               Narrative:    EXAMINATION:  XR CHEST AP PORTABLE    CLINICAL HISTORY:  Provided history is "chest pain;  ".    TECHNIQUE:  One view of the chest.    COMPARISON:  12/29/2022.    FINDINGS:  Tracheostomy tube is present with the tip overlying the tracheal air column between the clavicular heads and sherine.  Cardiac silhouette is borderline enlarged and similar to prior study.  Atherosclerotic calcifications overlie the aortic arch.  Probable pulmonary emphysema.  Coarsened interstitial lung markings with bibasilar subsegmental atelectasis.  Small left and trace right pleural effusions, similar to the prior study.  No pneumothorax.  Aeration is overall similar when compared with 12/29/2022. Probable percutaneous gastrostomy tube overlies the left upper quadrant of the abdomen.                                " "              Assessment/Plan:      * Acute blood loss anemia  Pt presented with hemoptysis. Scopes prior to admit and by ENT during admit not showing clear source. Bleeding improved. Pt underwent LHC for NSTEMI. After that procedure he was found to have a large retroperitoneal hematoma. Since starting DAPT he has also had recurrent hemoptysis as well as melanic stools. Trach cuff reinflated 4/2 with improvement in bleeding.  - trend CBC, transfuse prn  - empiric PPI for melena, however it appears this is blood from hemoptysis that was swallowed    Retroperitoneal hematoma  RP hematoma discovered after LHC. See "acute blood loss anemia"      NSTEMI (non-ST elevated myocardial infarction)  See "coronary artery disease"      Anxiety  Anxiety waxes and wanes with clinical status    UTI (urinary tract infection)  Urine culture growing Enterococcus > 100,000 cfu/ml, s/p appropriate treatment      Hemoptysis  -See acute blood loss anemia    Hypotension  Presented with hypotension and bleeding from tracheostomy/hemoptysis.  Patient does have lower blood pressure readings however this blood pressure was concerning in the 70s.  Patient did have some bleeding but did not suspect it was hemorrhagic.  Also thought to be septic due to possible UTI/pneumonia and being treated for antibiotics.  Also concerns for cardiogenic with an NSTEMI and being on nitro.  He was treated appropriately with antibiotics for his UTI/pneumonia and was also treated for NSTEMI.  Intermittently requiring pressor support with sedation after he was put on the ventilator.  Now on trach collar.  Hypotension has now resolved and he is doing well.    Pt went into hemorrhagic shock on 3/27 w/ L RP bleed. No evidence of continued bleeding on CTA. Levophed weaned.        Elevated brain natriuretic peptide (BNP) level  Diuresis held due to soft blood pressures      PEG (percutaneous endoscopic gastrostomy) status  Continue medications via PEG. Does not take " oral intake.   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      Acute on chronic respiratory failure with hypoxia  At home is on 5L O2 via trach and O2 sats typically in high 80s low 90s. Worsening respiration this admission due to aspiration of blood and likely food aspiration. Required ventilator from 3/21 to 3/23, subsequently weaned. Treated empirically for pneumonia. Respiratory cultures show bugs not susceptible to the antibiotics given, however given the overall clinical respiratory improvement will not restart antibiotics.   - pulm/crit, ENT following peripherally  - Currently on 10 liters trach collar  - diuresis held due to soft blood pressures and blood loss    Other hyperlipidemia  -Continue home statin    Primary hypertension  -BP typically in the 90s to low 100s systolic per chart review.   -Noted hypotension 3/19 which is addressed above.  -Holding home metoprolol and amlodipine    Coronary artery disease involving native coronary artery of native heart with unstable angina pectoris  Troponin elevated when pt was upgraded to the ICU. At that time it was thought to be due to demand, however he had persistent chest pain concerning for ACS. Was started on heparin gtt and tolerated. Subsequently went to [facility] which showed diffuse disease. Pt not a candidate for CABG. He subsequently underwent PCI of LAD and LCx.  - DAPT  - holding metoprol due to soft pressures    Squamous cell cancer of tongue  Dx 2021, now s/p total glossectomy, bilateral neck dissection, bilateral cervical facial flaps and anterolateral thigh flap reconstruction of glossectomy defect 1/4/22. Completed adjuvant  chemoradiation. Had trach changed by ENT 2 days prior to admit and scope at that time showed no signs of bleeding.         VTE Risk Mitigation (From admission, onward)           Ordered     IP VTE HIGH RISK PATIENT  Once         03/18/23 1620     Place sequential compression device  Until discontinued         03/18/23 1620     Place NIKITA hose  Until discontinued         03/18/23 1620                    Discharge Planning   NISA:      Code Status: Full Code   Is the patient medically ready for discharge?:     Reason for patient still in hospital (select all that apply): Patient trending condition, Laboratory test, Treatment, Consult recommendations and Pending disposition  Discharge Plan A: Home with family   Discharge Delays: None known at this time        Critical care time spent on the evaluation and treatment of severe organ dysfunction, review of pertinent labs and imaging studies, discussions with consulting providers and discussions with patient/family: 55 minutes.      Andry Zhao MD  Department of Hospital Medicine   [facility] [place] - Intensive Care

## 2023-04-05 NOTE — ASSESSMENT & PLAN NOTE
- PEG for feeding and meds; tube feeds typically well tolerated and no difficulty with home management by wife   - resumed continuous feeds on 4/4 per pt's request     - wife concerned of condition of PEG tube; original PEG tube in place; wife requesting GI consult to discuss while inpatient incase exchange is needed   - noted; management per hospital primary

## 2023-04-05 NOTE — PLAN OF CARE
Problem: Physical Therapy  Goal: Physical Therapy Goal  Description: Goals to be met by: 23     Patient will increase functional independence with mobility by performin. Supine to sit with Modified [place]  2. Sit to stand transfer with Supervision  3. Bed to chair transfer with Supervision   4. Gait  x 10-15 feet with Contact Guard Assistance using Rolling Walker.   5. Lower extremity exercise program x10 reps per handout, with supervision  6. W/C propulsion x 25' with Supervision    Outcome: Ongoing, Progressing   PT re-evaluation performed today.  Pt could benefit from skilled PT services 2-3x/wk in order to maximize function prior to D/C.  PT at next level of care recommended.

## 2023-04-05 NOTE — NURSING
[facility] [place]  Nurses Note -- 4 Eyes      4/5/2023       Skin assessed on: Q Shift      [x] No Pressure Injuries Present    [x]Prevention Measures Documented    [] Yes LDA  for Pressure Injury Previously documented     [] Yes New Pressure Injury Discovered   [] LDA for New Pressure Injury Added      Attending RN:  Zunilda Leavitt RN     Second RN:  Diamante Copopla RN

## 2023-04-05 NOTE — PLAN OF CARE
[facility] - Intensive Care  Discharge Reassessment    Primary Care Provider: Kodi Tubbs MD    Expected Discharge Date: TBD    Reassessment (most recent)       Discharge Reassessment - 04/05/23 1843          Discharge Reassessment    Assessment Type Discharge Planning Reassessment     Did the patient's condition or plan change since previous assessment? Yes     Discharge Plan discussed with: --   Chart Review    Communicated NISA with patient/caregiver Date not available/Unable to determine     Discharge Plan A Rehab     Discharge Plan B Skilled Nursing Facility     DME Needed Upon Discharge  none     Discharge Barriers Identified None     Why the patient remains in the hospital Requires continued medical care        Post-Acute Status    Discharge Delays None known at this time                 This patient has been screened for Case Management needs.  Based on (documentation in medical record/communication with attending  with nursing and therapy), when medically stable for discharge patient would like to try IRF or SNF.  Therapy will continue to work with patient and give recommendation for discharge disposition.    Case Management/Social Work remains available if a need arises, please enter consult for assistance.  For urgent needs contact Case Management Department/on-call at:  [phone].

## 2023-04-05 NOTE — NURSING
Upon giving pt a bath, noticed swelling to the corona of the penis, this was not present yesterday. Lainez removed to prevent any further swelling. Will monitor for retention

## 2023-04-05 NOTE — PROVIDER TRANSFER
Long, complicated course, see overview.   Hemoglobin now stable, and BP now not requiring pressors.  No further ICU needs.    Patient with chronic trach and PEG and severe debility from baseline  Now agreeable to continuous fluids  Mild hyperNa, will increase enteric bolus fluids   (300 mL q4h, can skip midnight dose= 1500 cc/day)  If works, patient will not have 24 hour care  Patient will need SNF/NH placement        Andry Zhao MD  Internal Medicine Staff

## 2023-04-05 NOTE — PT/OT/SLP PROGRESS
Occupational Therapy   Treatment    Name: Fareed Richard Jr.  MRN: [id number]  Admitting Diagnosis:  Acute blood loss anemia  9 Days Post-Op    Recommendations:     Discharge Recommendations:  ((OT at next level of care; if d/c home, HHOT w/ supervision)  Discharge Equipment Recommendations:  none  Barriers to discharge:   (If d/c home, pt will required continues care/assistance due to weakness and risk of falls. Pt was mod I w/ ADLs and functional mobility.)    Assessment:     Fareed Richard Jr. is a 73 y.o. male with a medical diagnosis of Acute blood loss anemia.  Performance deficits affecting function are weakness, impaired endurance, impaired self care skills, impaired functional mobility, gait instability, impaired balance, decreased lower extremity function, decreased safety awareness, impaired cardiopulmonary response to activity.     Pt pleasant and motivated to participate in tx session this date; pt was able to perform bed mobility w/ CGA-SBA to stand and take ~6 steps to chair w/ CGA and use of RW. Pt required cueing for pacing activities and taking rest breaks to prevent significant desaturation. Pt w/ SPO2 78-81% with exertion, requiring time for recovery while sitting up in chair. Pt will continue to benefit form skilled acute OT services to maximize functional capacity for safe performance w/ ADLs and functional mobility.     Pt's vital signs are as follows:   -Supine: 117/73, HR 94, 90%   -EOB: 120/58, HR ~100, 85-86%   -Post t/f: 118/59, , 78-81%    Rehab Prognosis:  Fair; patient would benefit from acute skilled OT services to address these deficits and reach maximum level of function.       Plan:     Patient to be seen 3 x/week, 5 x/week to address the above listed problems via self-care/home management, therapeutic activities, therapeutic exercises  Plan of Care Expires: 04/12/23  Plan of Care Reviewed with: patient    Subjective     Chief Complaint: None stated   Patient/Family  Comments/goals: To get up to chair   Pain/Comfort:  Pain Rating 1: 0/10  Pain Addressed 1: Reposition, Distraction, Cessation of Activity    Objective:     Communicated with: NurseZunilda,  prior to session.  Patient found HOB elevated with telemetry, peripheral IV, pulse ox (continuous), blood pressure cuff (trach collar) upon OT entry to room.    General Precautions: Standard, fall, respiratory    Orthopedic Precautions:N/A  Braces: N/A  Respiratory Status: 10L, 40% w/ Trach Colla     Occupational Performance:     Bed Mobility:    Patient completed Scooting hips to EOB with stand by assistance  Patient completed Supine to Sit with stand by assistance and contact guard assistance     Functional Mobility/Transfers:  Patient completed Sit <> Stand Transfer with contact guard assistance  with  rolling walker   Patient completed Bed <> Chair Transfer using Step Transfer technique with contact guard assistance with rolling walker  Functional Mobility: Pt was able to ambulate ~6 steps forward for transferring to chair; refer to PT note for assessment.      Activities of Daily Living:  Upper Body Dressing: minimum assistance for donning gown over back   Toileting: total assistance for donning brief in standing w/ BUE support on RW in standing       [facility] 6 Click ADL: 20    Treatment & Education:  -Pt performed ALD and functional mobility as noted above.   -Pt educated on safe functional mobility.   -Pt educated on importance of PLB and energy conservation strategies.   -Questions and concerns addressed within scope.     Patient left up in chair with all lines intact and call button in reach    GOALS:   Multidisciplinary Problems       Occupational Therapy Goals          Problem: Occupational Therapy    Goal Priority Disciplines Outcome Interventions   Occupational Therapy Goal     OT, PT/OT Ongoing, Progressing    Description: Goals to be met by: 4/12/23     Patient will increase functional independence with ADLs by  performing:    UE Dressing with Modified Rhea and Supervision.  LE Dressing with Modified Rhea and Supervision.  Grooming while standing at sink with Stand-by Assistance and Assistive Devices as needed.  Toileting from toilet with Modified Rhea, Supervision, and Assistive Devices as needed for hygiene and clothing management.   Sitting at edge of bed x30 minutes with Modified Rhea and Supervision.  Rolling to Bilateral with Modified Rhea.   Supine to sit with Modified Rhea.  Step transfer with Modified Rhea and Supervision  Toilet transfer to toilet with Supervision.  Upper extremity exercise program x10 reps per handout, with assistance as needed.                         Time Tracking:     OT Date of Treatment: 04/05/23  OT Start Time: 1023  OT Stop Time: 1040  OT Total Time (min): 17 min    Billable Minutes:Self Care/Home Management 17  Total Time 17 (co-tx w/ PT)     OT/NELLY: OT          4/5/2023

## 2023-04-05 NOTE — ASSESSMENT & PLAN NOTE
- trach  - respiratory status greatly affected during periods of anxiety   - noted; management per hospital primary and PCCM

## 2023-04-06 LAB
BASOPHILS # BLD AUTO: 0.02 K/UL (ref 0–0.2)
BASOPHILS NFR BLD: 0.3 % (ref 0–1.9)
DIFFERENTIAL METHOD: ABNORMAL
EOSINOPHIL # BLD AUTO: 0.2 K/UL (ref 0–0.5)
EOSINOPHIL NFR BLD: 2.3 % (ref 0–8)
ERYTHROCYTE [DISTWIDTH] IN BLOOD BY AUTOMATED COUNT: 17.9 % (ref 11.5–14.5)
HCT VFR BLD AUTO: 31 % (ref 40–54)
HGB BLD-MCNC: 9.6 G/DL (ref 14–18)
IMM GRANULOCYTES # BLD AUTO: 0.03 K/UL (ref 0–0.04)
IMM GRANULOCYTES NFR BLD AUTO: 0.4 % (ref 0–0.5)
LYMPHOCYTES # BLD AUTO: 0.3 K/UL (ref 1–4.8)
LYMPHOCYTES NFR BLD: 4.3 % (ref 18–48)
MCH RBC QN AUTO: 30.3 PG (ref 27–31)
MCHC RBC AUTO-ENTMCNC: 31 G/DL (ref 32–36)
MCV RBC AUTO: 98 FL (ref 82–98)
MONOCYTES # BLD AUTO: 0.7 K/UL (ref 0.3–1)
MONOCYTES NFR BLD: 9 % (ref 4–15)
NEUTROPHILS # BLD AUTO: 6.2 K/UL (ref 1.8–7.7)
NEUTROPHILS NFR BLD: 83.7 % (ref 38–73)
NRBC BLD-RTO: 0 /100 WBC
PLATELET # BLD AUTO: 178 K/UL (ref 150–450)
PMV BLD AUTO: 9.9 FL (ref 9.2–12.9)
POCT GLUCOSE: 125 MG/DL (ref 70–110)
POCT GLUCOSE: 137 MG/DL (ref 70–110)
POCT GLUCOSE: 169 MG/DL (ref 70–110)
RBC # BLD AUTO: 3.17 M/UL (ref 4.6–6.2)
WBC # BLD AUTO: 7.37 K/UL (ref 3.9–12.7)

## 2023-04-06 PROCEDURE — 25000003 PHARM REV CODE 250: Performed by: REGISTERED NURSE

## 2023-04-06 PROCEDURE — C9113 INJ PANTOPRAZOLE SODIUM, VIA: HCPCS | Performed by: STUDENT IN AN ORGANIZED HEALTH CARE EDUCATION/TRAINING PROGRAM

## 2023-04-06 PROCEDURE — 25000003 PHARM REV CODE 250: Performed by: INTERNAL MEDICINE

## 2023-04-06 PROCEDURE — 25000242 PHARM REV CODE 250 ALT 637 W/ HCPCS: Performed by: STUDENT IN AN ORGANIZED HEALTH CARE EDUCATION/TRAINING PROGRAM

## 2023-04-06 PROCEDURE — 25000003 PHARM REV CODE 250: Performed by: HOSPITALIST

## 2023-04-06 PROCEDURE — 85025 COMPLETE CBC W/AUTO DIFF WBC: CPT | Performed by: HOSPITALIST

## 2023-04-06 PROCEDURE — A4216 STERILE WATER/SALINE, 10 ML: HCPCS | Performed by: INTERNAL MEDICINE

## 2023-04-06 PROCEDURE — 27000221 HC OXYGEN, UP TO 24 HOURS

## 2023-04-06 PROCEDURE — 99900035 HC TECH TIME PER 15 MIN (STAT)

## 2023-04-06 PROCEDURE — 99900026 HC AIRWAY MAINTENANCE (STAT)

## 2023-04-06 PROCEDURE — 94640 AIRWAY INHALATION TREATMENT: CPT

## 2023-04-06 PROCEDURE — 21400001 HC TELEMETRY ROOM

## 2023-04-06 PROCEDURE — 97535 SELF CARE MNGMENT TRAINING: CPT

## 2023-04-06 PROCEDURE — 94761 N-INVAS EAR/PLS OXIMETRY MLT: CPT

## 2023-04-06 PROCEDURE — 97530 THERAPEUTIC ACTIVITIES: CPT

## 2023-04-06 PROCEDURE — 25000003 PHARM REV CODE 250: Performed by: STUDENT IN AN ORGANIZED HEALTH CARE EDUCATION/TRAINING PROGRAM

## 2023-04-06 PROCEDURE — 63600175 PHARM REV CODE 636 W HCPCS: Performed by: STUDENT IN AN ORGANIZED HEALTH CARE EDUCATION/TRAINING PROGRAM

## 2023-04-06 RX ADMIN — Medication 10 ML: at 06:04

## 2023-04-06 RX ADMIN — CLOPIDOGREL BISULFATE 75 MG: 75 TABLET ORAL at 08:04

## 2023-04-06 RX ADMIN — HYDROXYZINE HYDROCHLORIDE 25 MG: 25 TABLET ORAL at 03:04

## 2023-04-06 RX ADMIN — METOPROLOL TARTRATE 25 MG: 25 TABLET, FILM COATED ORAL at 08:04

## 2023-04-06 RX ADMIN — GLYCOPYRROLATE 1 MG: 1 TABLET ORAL at 03:04

## 2023-04-06 RX ADMIN — MELATONIN TAB 3 MG 9 MG: 3 TAB at 08:04

## 2023-04-06 RX ADMIN — Medication 10 ML: at 01:04

## 2023-04-06 RX ADMIN — FERROUS SULFATE TAB 325 MG (65 MG ELEMENTAL FE) 1 EACH: 325 (65 FE) TAB at 08:04

## 2023-04-06 RX ADMIN — GLYCOPYRROLATE 1 MG: 1 TABLET ORAL at 08:04

## 2023-04-06 RX ADMIN — Medication 10 ML: at 12:04

## 2023-04-06 RX ADMIN — ATORVASTATIN CALCIUM 80 MG: 40 TABLET, FILM COATED ORAL at 08:04

## 2023-04-06 RX ADMIN — LIDOCAINE 5% 2 PATCH: 700 PATCH TOPICAL at 12:04

## 2023-04-06 RX ADMIN — OXYCODONE HYDROCHLORIDE 5 MG: 5 TABLET ORAL at 08:04

## 2023-04-06 RX ADMIN — PANTOPRAZOLE SODIUM 40 MG: 40 INJECTION, POWDER, FOR SOLUTION INTRAVENOUS at 08:04

## 2023-04-06 RX ADMIN — IPRATROPIUM BROMIDE AND ALBUTEROL SULFATE 3 ML: 2.5; .5 SOLUTION RESPIRATORY (INHALATION) at 08:04

## 2023-04-06 RX ADMIN — ASPIRIN 81 MG CHEWABLE TABLET 81 MG: 81 TABLET CHEWABLE at 08:04

## 2023-04-06 RX ADMIN — HYDROXYZINE HYDROCHLORIDE 25 MG: 25 TABLET ORAL at 08:04

## 2023-04-06 RX ADMIN — Medication 10 ML: at 05:04

## 2023-04-06 RX ADMIN — IPRATROPIUM BROMIDE AND ALBUTEROL SULFATE 3 ML: 2.5; .5 SOLUTION RESPIRATORY (INHALATION) at 07:04

## 2023-04-06 NOTE — SUBJECTIVE & OBJECTIVE
Interval History:   No events overnight.   Became a bit more cooperative  HH is stable at this time  Stable on trach collar 40% FIO2.    Review of Systems   Constitutional:  Positive for activity change and fatigue.   Cardiovascular:  Negative for chest pain.   Gastrointestinal:  Negative for abdominal pain.     Objective:     Vital Signs (Most Recent):  Temp: 97.8 °F (36.6 °C) (04/06/23 0400)  Pulse: 95 (04/06/23 0610)  Resp: (!) 25 (04/06/23 0610)  BP: 134/80 (04/06/23 0610)  SpO2: 98 % (04/06/23 0610)   Vital Signs (24h Range):  Temp:  [97.8 °F (36.6 °C)-98.2 °F (36.8 °C)] 97.8 °F (36.6 °C)  Pulse:  [] 95  Resp:  [21-43] 25  SpO2:  [88 %-100 %] 98 %  BP: (108-156)/() 134/80     Weight: 97.8 kg (215 lb 9.8 oz)  Body mass index is 32.78 kg/m².    Intake/Output Summary (Last 24 hours) at 4/6/2023 0800  Last data filed at 4/6/2023 0431  Gross per 24 hour   Intake 1480 ml   Output 750 ml   Net 730 ml        Physical Exam  Vitals reviewed.   Constitutional:       General: He is not in acute distress.     Appearance: He is well-developed and normal weight. He is ill-appearing. He is not toxic-appearing or diaphoretic.   HENT:      Head: Normocephalic and atraumatic.   Eyes:      General: No scleral icterus.     Pupils: Pupils are equal, round, and reactive to light.   Neck:      Thyroid: No thyromegaly.   Cardiovascular:      Rate and Rhythm: Normal rate and regular rhythm.      Heart sounds: No murmur heard.    No gallop.   Pulmonary:      Effort: Pulmonary effort is normal. No respiratory distress.      Breath sounds: Normal breath sounds. No stridor.   Abdominal:      General: Bowel sounds are normal. There is no distension.      Palpations: Abdomen is soft.      Tenderness: There is no abdominal tenderness.   Musculoskeletal:         General: No deformity. Normal range of motion.      Cervical back: Normal range of motion.   Skin:     General: Skin is warm.      Capillary Refill: Capillary refill takes  less than 2 seconds.      Coloration: Skin is not jaundiced.      Findings: No bruising.   Neurological:      Mental Status: He is alert and oriented to person, place, and time.      Motor: Weakness present.      Gait: Gait abnormal.   Psychiatric:         Mood and Affect: Mood normal.         Behavior: Behavior normal.           Recent Results (from the past 24 hour(s))   POCT glucose    Collection Time: 04/05/23 11:31 AM   Result Value Ref Range    POCT Glucose 123 (H) 70 - 110 mg/dL   POCT glucose    Collection Time: 04/05/23  4:24 PM   Result Value Ref Range    POCT Glucose 152 (H) 70 - 110 mg/dL   CBC auto differential    Collection Time: 04/06/23  6:23 AM   Result Value Ref Range    WBC 7.37 3.90 - 12.70 K/uL    RBC 3.17 (L) 4.60 - 6.20 M/uL    Hemoglobin 9.6 (L) 14.0 - 18.0 g/dL    Hematocrit 31.0 (L) 40.0 - 54.0 %    MCV 98 82 - 98 fL    MCH 30.3 27.0 - 31.0 pg    MCHC 31.0 (L) 32.0 - 36.0 g/dL    RDW 17.9 (H) 11.5 - 14.5 %    Platelets 178 150 - 450 K/uL    MPV 9.9 9.2 - 12.9 fL    Immature Granulocytes 0.4 0.0 - 0.5 %    Gran # (ANC) 6.2 1.8 - 7.7 K/uL    Immature Grans (Abs) 0.03 0.00 - 0.04 K/uL    Lymph # 0.3 (L) 1.0 - 4.8 K/uL    Mono # 0.7 0.3 - 1.0 K/uL    Eos # 0.2 0.0 - 0.5 K/uL    Baso # 0.02 0.00 - 0.20 K/uL    nRBC 0 0 /100 WBC    Gran % 83.7 (H) 38.0 - 73.0 %    Lymph % 4.3 (L) 18.0 - 48.0 %    Mono % 9.0 4.0 - 15.0 %    Eosinophil % 2.3 0.0 - 8.0 %    Basophil % 0.3 0.0 - 1.9 %    Differential Method Automated        Microbiology Results (last 7 days)       ** No results found for the last 168 hours. **             Imaging Results              X-Ray Chest AP Portable (Final result)  Result time 03/18/23 12:01:13      Final result by Mariano Soto MD (03/18/23 12:01:13)                   Impression:      No detrimental change when compared with 12/29/2022.      Electronically signed by: Mariano Soto MD  Date:    03/18/2023  Time:    12:01               Narrative:    EXAMINATION:  XR  "CHEST AP PORTABLE    CLINICAL HISTORY:  Provided history is "chest pain;  ".    TECHNIQUE:  One view of the chest.    COMPARISON:  12/29/2022.    FINDINGS:  Tracheostomy tube is present with the tip overlying the tracheal air column between the clavicular heads and sherine.  Cardiac silhouette is borderline enlarged and similar to prior study.  Atherosclerotic calcifications overlie the aortic arch.  Probable pulmonary emphysema.  Coarsened interstitial lung markings with bibasilar subsegmental atelectasis.  Small left and trace right pleural effusions, similar to the prior study.  No pneumothorax.  Aeration is overall similar when compared with 12/29/2022. Probable percutaneous gastrostomy tube overlies the left upper quadrant of the abdomen.                                        "

## 2023-04-06 NOTE — PT/OT/SLP PROGRESS
Physical Therapy      Patient Name:  Fareed Richard Jr.   MRN:  [id number]    Patient not seen today secondary to  (Per Nursing, Pt already up in/To chair/BSC and standing with RW). Will follow-up .

## 2023-04-06 NOTE — PROGRESS NOTES
[facility]  Progress Note    Patient Name: Fareed Richard Jr.  MRN: [id number]  Patient Class: IP- Inpatient   Admission Date: 3/18/2023  Length of Stay: 18 days  Attending Physician: Santos Marlow MD  Primary Care Provider: Kodi Tubbs MD        Subjective:     Principal Problem:Acute blood loss anemia        HPI:  Fareed Richard Jr. 73 y.o. 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  Per his wife 4 days ago he began to have pink tinged sputum via his trach.  He was seen by his ENT 2 days ago with a scope exam and a trach exchange without evidence of bleeding.  This morning at 3:00 a.m. he developed bright red blood via trach which resolved without intervention.  It is since not recurred.  He takes a daily aspirin.  He receives all medications via G-tube.  His tube feeding regimen consists of 6 cans of Isosource daily with 120 cc free water boluses.  He denies fever chest pain shortness of breath nausea vomiting abdominal pain leg swelling syncope dizziness dysuria melena hematuria hematemesis.    In the ED, hemoglobin 7.6 INR 1.0 BUN 50 creatinine 0.7  troponin negative BRCA negative O positive type and screen chest x-ray without detrimental change hypotensive to 85/54.      Overview/Hospital Course:  74 yo M w squamous CA of tongue s/p glossectomy and reconstructive flap with trach, CAD, chronic hypoxic respiratory failure (on 5L at home) and with peg presented for eval of hemoptysis 2 days after trach change in clinic.  Transferred to ICU 3/19 when markedly hypotensive.  Unclear if due to hemorrhage, cardiogenic or septic shock.  Did require PRBC and TXA nebs but didn't seem like sufficient bleeding to cause shock.  Bleeding has stopped and ENT is on board and following.  CTA shows either significant mucus plugging or bibasilar pneumonia -  pulmonology favored pneumonia.  Also has urine culture growing Enterococcus.  He is on broad spectrum antibiotics. Troponin checked due to hypotension and it was 1.17.  Cards consulted and feel it's demand due to ischemia and hypotension and recommend outpatient stress. AM cortisol was low so ordered cosyntropin stim test which shows an adequate adrenal response to r/o adrenal insufficiency.  Developed severe shoulder pain and hypoxia, increasing troponin and pulmonary edema on CXR - shoulder pain likely anginal equivalent. Discussed with Cardiology, started heparin and nitro drip. Required nitro drip for chest pain. Tolerating heparin drip without evidence of further tracheostomy bleeding. Further discussions with Cardiology, and brought patient for LHC/angiogram on 3/23 which showed distal LM 30%, mild LAD 90% bifurcation lesion with D1, Cx mid 80%, RCA moderate diffuse disease with long 70% and some left to right collateral. All vessels heavily calcified. Not a candidate for CABG but plan for staged PCI. Continues on heparin drip, asa/plavix and tolerating. Off pressor support/nitro drip. No further bleeding and Hb remains stable. Had episodes of hypoxia/respiratory distress after vomiting and was intermittently placed on ventilator -  now back to trach collar close to home O2 requirements. Plan for angiogram/PCI on 3/27 with Dr. Bhatt.    Pt went for LHC w/ PCI to LAD and Lcx. Post operatively he was hemodynamically unstable and anemic. Stat CTA showed RP hematoma near L kidney without acute bleeding. Pt transfused supportively. He improved over the next few days, however still required periodic transfusion. On 3/31 he started coughing up blood from his trach and became hypotensive/tachycardic.    Given Hypotension of 86/51 and Hb goal 8-9 with NSTEMI/stent placement, another unit was given. Hb altagracia appropriately and BP now 142/80. Stable for step down or placement.   HH is stable at this time  Stable on trach  collar 40% FIO2.      Interval History:   No events overnight.   Became a bit more cooperative  HH is stable at this time  Stable on trach collar 40% FIO2.    Review of Systems   Constitutional:  Positive for activity change and fatigue.   Cardiovascular:  Negative for chest pain.   Gastrointestinal:  Negative for abdominal pain.     Objective:     Vital Signs (Most Recent):  Temp: 97.8 °F (36.6 °C) (04/06/23 0400)  Pulse: 95 (04/06/23 0610)  Resp: (!) 25 (04/06/23 0610)  BP: 134/80 (04/06/23 0610)  SpO2: 98 % (04/06/23 0610)   Vital Signs (24h Range):  Temp:  [97.8 °F (36.6 °C)-98.2 °F (36.8 °C)] 97.8 °F (36.6 °C)  Pulse:  [] 95  Resp:  [21-43] 25  SpO2:  [88 %-100 %] 98 %  BP: (108-156)/() 134/80     Weight: 97.8 kg (215 lb 9.8 oz)  Body mass index is 32.78 kg/m².    Intake/Output Summary (Last 24 hours) at 4/6/2023 0800  Last data filed at 4/6/2023 0431  Gross per 24 hour   Intake 1480 ml   Output 750 ml   Net 730 ml        Physical Exam  Vitals reviewed.   Constitutional:       General: He is not in acute distress.     Appearance: He is well-developed and normal weight. He is ill-appearing. He is not toxic-appearing or diaphoretic.   HENT:      Head: Normocephalic and atraumatic.   Eyes:      General: No scleral icterus.     Pupils: Pupils are equal, round, and reactive to light.   Neck:      Thyroid: No thyromegaly.   Cardiovascular:      Rate and Rhythm: Normal rate and regular rhythm.      Heart sounds: No murmur heard.    No gallop.   Pulmonary:      Effort: Pulmonary effort is normal. No respiratory distress.      Breath sounds: Normal breath sounds. No stridor.   Abdominal:      General: Bowel sounds are normal. There is no distension.      Palpations: Abdomen is soft.      Tenderness: There is no abdominal tenderness.   Musculoskeletal:         General: No deformity. Normal range of motion.      Cervical back: Normal range of motion.   Skin:     General: Skin is warm.      Capillary Refill:  Capillary refill takes less than 2 seconds.      Coloration: Skin is not jaundiced.      Findings: No bruising.   Neurological:      Mental Status: He is alert and oriented to person, place, and time.      Motor: Weakness present.      Gait: Gait abnormal.   Psychiatric:         Mood and Affect: Mood normal.         Behavior: Behavior normal.           Recent Results (from the past 24 hour(s))   POCT glucose    Collection Time: 04/05/23 11:31 AM   Result Value Ref Range    POCT Glucose 123 (H) 70 - 110 mg/dL   POCT glucose    Collection Time: 04/05/23  4:24 PM   Result Value Ref Range    POCT Glucose 152 (H) 70 - 110 mg/dL   CBC auto differential    Collection Time: 04/06/23  6:23 AM   Result Value Ref Range    WBC 7.37 3.90 - 12.70 K/uL    RBC 3.17 (L) 4.60 - 6.20 M/uL    Hemoglobin 9.6 (L) 14.0 - 18.0 g/dL    Hematocrit 31.0 (L) 40.0 - 54.0 %    MCV 98 82 - 98 fL    MCH 30.3 27.0 - 31.0 pg    MCHC 31.0 (L) 32.0 - 36.0 g/dL    RDW 17.9 (H) 11.5 - 14.5 %    Platelets 178 150 - 450 K/uL    MPV 9.9 9.2 - 12.9 fL    Immature Granulocytes 0.4 0.0 - 0.5 %    Gran # (ANC) 6.2 1.8 - 7.7 K/uL    Immature Grans (Abs) 0.03 0.00 - 0.04 K/uL    Lymph # 0.3 (L) 1.0 - 4.8 K/uL    Mono # 0.7 0.3 - 1.0 K/uL    Eos # 0.2 0.0 - 0.5 K/uL    Baso # 0.02 0.00 - 0.20 K/uL    nRBC 0 0 /100 WBC    Gran % 83.7 (H) 38.0 - 73.0 %    Lymph % 4.3 (L) 18.0 - 48.0 %    Mono % 9.0 4.0 - 15.0 %    Eosinophil % 2.3 0.0 - 8.0 %    Basophil % 0.3 0.0 - 1.9 %    Differential Method Automated        Microbiology Results (last 7 days)       ** No results found for the last 168 hours. **             Imaging Results              X-Ray Chest AP Portable (Final result)  Result time 03/18/23 12:01:13      Final result by Mariano Soto MD (03/18/23 12:01:13)                   Impression:      No detrimental change when compared with 12/29/2022.      Electronically signed by: Mariano Soto MD  Date:    03/18/2023  Time:    12:01               Narrative:  "   EXAMINATION:  XR CHEST AP PORTABLE    CLINICAL HISTORY:  Provided history is "chest pain;  ".    TECHNIQUE:  One view of the chest.    COMPARISON:  12/29/2022.    FINDINGS:  Tracheostomy tube is present with the tip overlying the tracheal air column between the clavicular heads and sherine.  Cardiac silhouette is borderline enlarged and similar to prior study.  Atherosclerotic calcifications overlie the aortic arch.  Probable pulmonary emphysema.  Coarsened interstitial lung markings with bibasilar subsegmental atelectasis.  Small left and trace right pleural effusions, similar to the prior study.  No pneumothorax.  Aeration is overall similar when compared with 12/29/2022. Probable percutaneous gastrostomy tube overlies the left upper quadrant of the abdomen.                                            Assessment/Plan:      * Acute blood loss anemia  Pt presented with hemoptysis. Scopes prior to admit and by ENT during admit not showing clear source. Bleeding improved. Pt underwent LHC for NSTEMI. After that procedure he was found to have a large retroperitoneal hematoma. Since starting DAPT he has also had recurrent hemoptysis as well as melanic stools. Trach cuff reinflated 4/2 with improvement in bleeding.  - trend CBC, transfuse prn  - empiric PPI for melena, however it appears this is blood from hemoptysis that was swallowed,  HH is stable at this time      Retroperitoneal hematoma  RP hematoma discovered after LHC. See "acute blood loss anemia"      NSTEMI (non-ST elevated myocardial infarction)  See "coronary artery disease"      Anxiety  Anxiety waxes and wanes with clinical status    UTI (urinary tract infection)  Urine culture growing Enterococcus > 100,000 cfu/ml, s/p appropriate treatment      Hemoptysis  -See acute blood loss anemia    Hypotension  Presented with hypotension and bleeding from tracheostomy/hemoptysis.  Patient does have lower blood pressure readings however this blood pressure was " concerning in the 70s.  Patient did have some bleeding but did not suspect it was hemorrhagic.  Also thought to be septic due to possible UTI/pneumonia and being treated for antibiotics.  Also concerns for cardiogenic with an NSTEMI and being on nitro.  He was treated appropriately with antibiotics for his UTI/pneumonia and was also treated for NSTEMI.  Intermittently requiring pressor support with sedation after he was put on the ventilator.  Now on trach collar.  Hypotension has now resolved and he is doing well.    Pt went into hemorrhagic shock on 3/27 w/ L RP bleed. No evidence of continued bleeding on CTA. Levophed weaned.        Elevated brain natriuretic peptide (BNP) level  Diuresis held due to soft blood pressures      PEG (percutaneous endoscopic gastrostomy) status  Continue medications via PEG. Does not take oral intake.   -On isosource 1.5 6 days daily with 120cc free water flushes via wife  -Will continue home tube feedings, with nutrition consulted    Tracheostomy present    Stable on trach collar 40% FIO2.    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   -TSH is normal and he is not on treatment as outpatient    ACP (advance care planning)  Appreciate palliative care involvement.      Acute on chronic respiratory failure with hypoxia  At home is on 5L O2 via trach and O2 sats typically in high 80s low 90s. Worsening respiration this admission due to aspiration of blood and likely food aspiration. Required ventilator from 3/21 to 3/23, subsequently weaned. Treated empirically for pneumonia. Respiratory cultures show bugs not susceptible to the antibiotics given, however given the overall clinical respiratory improvement will not restart antibiotics.   - pulm/crit, ENT following peripherally  - Currently on 10 liters trach collar  - diuresis held due to soft blood pressures and blood loss,    Stable on trach collar 40% FIO2.    Protein-calorie malnutrition  Nutrition consulted.  Most recent weight and BMI monitored-                         Measurements:  Wt Readings from Last 1 Encounters:   04/05/23 97.8 kg (215 lb 9.8 oz)   Body mass index is 32.78 kg/m².    Recommendations: Recommendation/Intervention: 1. Continue with TF recs; Monitor diet advancements. 2. Advance diet as tolerated and appropriate. 3. Monitor weight changes; check weekly weights.  Goals: 1. Diet advancement by RD follow up.    Patient has been screened and assessed by RD. RD will follow patient.      COPD exacerbation  on treatment with nebulizer.      Other hyperlipidemia  -Continue home statin    Primary hypertension  -BP typically in the 90s to low 100s systolic per chart review.   -Noted hypotension 3/19 which is addressed above.  -Holding home metoprolol and amlodipine    Coronary artery disease involving native coronary artery of native heart with unstable angina pectoris  Troponin elevated when pt was upgraded to the ICU. At that time it was thought to be due to demand, however he had persistent chest pain concerning for ACS. Was started on heparin gtt and tolerated. Subsequently went to [facility] which showed diffuse disease. Pt not a candidate for CABG. He subsequently underwent PCI of LAD and LCx.  - DAPT  - holding metoprol due to soft pressures    Squamous cell cancer of tongue  Dx 2021, now s/p total glossectomy, bilateral neck dissection, bilateral cervical facial flaps and anterolateral thigh flap reconstruction of glossectomy defect 1/4/22. Completed adjuvant chemoradiation. Had trach changed by ENT 2 days prior to admit and scope at that time showed no signs of bleeding.       Carotid stenosis  On medical treatment .      Peripheral vascular disease  On medical treatment .        VTE Risk Mitigation (From admission, onward)         Ordered     IP VTE HIGH RISK PATIENT  Once         03/18/23 1620     Place sequential compression device  Until discontinued         03/18/23 1620     Place NIKITA hose  Until  discontinued         03/18/23 1620                Discharge Planning   NISA:      Code Status: Full Code   Is the patient medically ready for discharge?:     Reason for patient still in hospital (select all that apply): Patient trending condition  Discharge Plan A: Rehab   Discharge Delays: None known at this time        Critical care time spent on the evaluation and treatment of severe organ dysfunction, review of pertinent labs and imaging studies, discussions with consulting providers and discussions with patient/family:  Over 30  minutes.      Santos Marlow MD  Department of Hospital Medicine   [facility] - Intensive Care

## 2023-04-06 NOTE — NURSING
[facility], [facility] - [place]  Nurses Note -- 4 Eyes      4/6/2023       Skin assessed on: Q Shift      [x] No Pressure Injuries Present    []Prevention Measures Documented    [] Yes LDA  for Pressure Injury Previously documented     [] Yes New Pressure Injury Discovered   [] LDA for New Pressure Injury Added      Attending RN:  Diamante Coppola, RN     Second RN:  Zunilda

## 2023-04-06 NOTE — PLAN OF CARE
Problem: Occupational Therapy  Goal: Occupational Therapy Goal  Description: Goals to be met by: 4/12/23     Patient will increase functional independence with ADLs by performing:    UE Dressing with Modified [place] and Supervision.  LE Dressing with Modified [place] and Supervision.  Grooming while standing at sink with Stand-by Assistance and Assistive Devices as needed.  Toileting from toilet with Modified [place], Supervision, and Assistive Devices as needed for hygiene and clothing management.   Sitting at edge of bed x30 minutes with Modified [place] and Supervision.  Rolling to Bilateral with Modified [place].   Supine to sit with Modified [place].  Step transfer with Modified [place] and Supervision  Toilet transfer to toilet with Supervision.  Upper extremity exercise program x10 reps per handout, with assistance as needed.    Outcome: Ongoing, Progressing

## 2023-04-06 NOTE — ASSESSMENT & PLAN NOTE
Nutrition consulted. Most recent weight and BMI monitored-                         Measurements:  Wt Readings from Last 1 Encounters:   04/05/23 97.8 kg (215 lb 9.8 oz)   Body mass index is 32.78 kg/m².    Recommendations: Recommendation/Intervention: 1. Continue with TF recs; Monitor diet advancements. 2. Advance diet as tolerated and appropriate. 3. Monitor weight changes; check weekly weights.  Goals: 1. Diet advancement by RD follow up.    Patient has been screened and assessed by RD. RD will follow patient.

## 2023-04-06 NOTE — NURSING
[facility], [facility] [place]  Nurses Note -- 4 Eyes      4/6/2023       Skin assessed on: Q Shift      [x] No Pressure Injuries Present    [x]Prevention Measures Documented    [] Yes LDA  for Pressure Injury Previously documented     [] Yes New Pressure Injury Discovered   [] LDA for New Pressure Injury Added      Attending RN:  Zunilda Leavitt RN     Second RN:  Diamante Coppola RN

## 2023-04-06 NOTE — ASSESSMENT & PLAN NOTE
Pt presented with hemoptysis. Scopes prior to admit and by ENT during admit not showing clear source. Bleeding improved. Pt underwent LHC for NSTEMI. After that procedure he was found to have a large retroperitoneal hematoma. Since starting DAPT he has also had recurrent hemoptysis as well as melanic stools. Trach cuff reinflated 4/2 with improvement in bleeding.  - trend CBC, transfuse prn  - empiric PPI for melena, however it appears this is blood from hemoptysis that was swallowed,  HH is stable at this time

## 2023-04-06 NOTE — PLAN OF CARE
Problem: Fall Injury Risk  Goal: Absence of Fall and Fall-Related Injury  Outcome: Ongoing, Progressing     Problem: Skin Injury Risk Increased  Goal: Skin Health and Integrity  Outcome: Ongoing, Progressing     Problem: Infection  Goal: Absence of Infection Signs and Symptoms  Outcome: Ongoing, Progressing     Problem: Coping Ineffective  Goal: Effective Coping  Outcome: Ongoing, Progressing     Problem: Impaired Wound Healing  Goal: Optimal Wound Healing  Outcome: Ongoing, Progressing

## 2023-04-06 NOTE — PT/OT/SLP PROGRESS
Occupational Therapy   Treatment    Name: Fareed Richard Jr.  MRN: [id number]  Admitting Diagnosis:  Acute blood loss anemia  10 Days Post-Op    Recommendations:     Discharge Recommendations:  (OT at next level of care; Per chart, IPR)  Discharge Equipment Recommendations:   (TBD)  Barriers to discharge:   (If d/c home, pt will required continues care/assistance due to weakness and risk of falls. Pt was mod I w/ ADLs and functional mobility.)    Assessment:     Fareed Richard Jr. is a 73 y.o. male with a medical diagnosis of Acute blood loss anemia.  Performance deficits affecting function are weakness, impaired endurance, impaired self care skills, impaired functional mobility, gait instability, decreased lower extremity function, decreased safety awareness, decreased coordination, impaired balance, impaired cardiopulmonary response to activity.     Pt pleasant and willing to participate in tx session this date; pt w/ progress as he was able to ambulate short functional distance in room (10ft x 2 trails). Pt w/ SPO2 81-83% with exertion but recovered to 87-90% w/ rest; -115 bpm. Pt tolerated tx session well and will continue to benefit from skilled acute OT services to maximize functional capacity for safe performance w/ ADLs and functional mobility.     Rehab Prognosis:  Good; patient would benefit from acute skilled OT services to address these deficits and reach maximum level of function.       Plan:     Patient to be seen 3 x/week, 5 x/week to address the above listed problems via self-care/home management, therapeutic activities, therapeutic exercises  Plan of Care Expires: 04/12/23  Plan of Care Reviewed with: patient    Subjective     Chief Complaint: None stated   Patient/Family Comments/goals: Agreeable and pleasant   Pain/Comfort:  Pain Rating 1: 0/10  Pain Rating Post-Intervention 1: 0/10    Objective:     Communicated with: Nurse prior to session.  Patient found HOB elevated with peripheral IV,  Tracheostomy, blood pressure cuff, pulse ox (continuous), PICC line, telemetry upon OT entry to room.    General Precautions: Standard, fall, respiratory    Orthopedic Precautions:N/A  Braces: N/A  Respiratory Status: 10L, 35% FiO2 Trach Collar     Occupational Performance:     Bed Mobility:    Patient completed Scooting hips to EOB with stand by assistance  Patient completed Supine to Sit with stand by assistance and contact guard assistance w/ HOB elevated   Patient completed Sit to Supine with stand by assistance and contact guard assistance     Functional Mobility/Transfers:  Patient completed Sit <> Stand Transfer x 2 trials from EOB and x 1 trial from chair with contact guard assistance  with  rolling walker   Functional Mobility: Pt was able to ambulate from EOB>chair near window w/ CGA and use of RW, requiring a seated rest break; pt w/ SPO2 82% and recovered for ~4 min. Pt then ambulated back to EOB w/ same level of assist.     Activities of Daily Living:  Upper Body Dressing: minimum assistance for donning gown over back   Toileting: total assistance for donning brief in standing w/ BUE support on RW in standing       [facility] 6 Click ADL: 20    Treatment & Education:  -Pt performed ALD and functional mobility as noted above.   -Pt educated on safe functional mobility.   -Pt educated on importance of PLB and energy conservation strategies.   -Questions and concerns addressed within scope.     Patient left HOB elevated with all lines intact and call button in reach    GOALS:   Multidisciplinary Problems       Occupational Therapy Goals          Problem: Occupational Therapy    Goal Priority Disciplines Outcome Interventions   Occupational Therapy Goal     OT, PT/OT Ongoing, Progressing    Description: Goals to be met by: 4/12/23     Patient will increase functional independence with ADLs by performing:    UE Dressing with Modified [employer] and Supervision.  LE Dressing with Modified [employer] and  Supervision.  Grooming while standing at sink with Stand-by Assistance and Assistive Devices as needed.  Toileting from toilet with Modified [place], Supervision, and Assistive Devices as needed for hygiene and clothing management.   Sitting at edge of bed x30 minutes with Modified [place] and Supervision.  Rolling to Bilateral with Modified [place].   Supine to sit with Modified [place].  Step transfer with Modified [place] and Supervision  Toilet transfer to toilet with Supervision.  Upper extremity exercise program x10 reps per handout, with assistance as needed.                         Time Tracking:     OT Date of Treatment: 04/06/23  OT Start Time: 1620  OT Stop Time: 1648  OT Total Time (min): 28 min    Billable Minutes:Self Care/Home Management 14  Therapeutic Activity 14  Total Time 28    OT/NELLY: OT          4/6/2023

## 2023-04-06 NOTE — ASSESSMENT & PLAN NOTE
At home is on 5L O2 via trach and O2 sats typically in high 80s low 90s. Worsening respiration this admission due to aspiration of blood and likely food aspiration. Required ventilator from 3/21 to 3/23, subsequently weaned. Treated empirically for pneumonia. Respiratory cultures show bugs not susceptible to the antibiotics given, however given the overall clinical respiratory improvement will not restart antibiotics.   - pulm/crit, ENT following peripherally  - Currently on 10 liters trach collar  - diuresis held due to soft blood pressures and blood loss,    Stable on trach collar 40% FIO2.

## 2023-04-07 LAB
POCT GLUCOSE: 141 MG/DL (ref 70–110)
POCT GLUCOSE: 144 MG/DL (ref 70–110)
POCT GLUCOSE: 148 MG/DL (ref 70–110)
POCT GLUCOSE: 154 MG/DL (ref 70–110)

## 2023-04-07 PROCEDURE — 25000003 PHARM REV CODE 250: Performed by: REGISTERED NURSE

## 2023-04-07 PROCEDURE — C9113 INJ PANTOPRAZOLE SODIUM, VIA: HCPCS | Performed by: STUDENT IN AN ORGANIZED HEALTH CARE EDUCATION/TRAINING PROGRAM

## 2023-04-07 PROCEDURE — 94761 N-INVAS EAR/PLS OXIMETRY MLT: CPT

## 2023-04-07 PROCEDURE — 94640 AIRWAY INHALATION TREATMENT: CPT

## 2023-04-07 PROCEDURE — 25000242 PHARM REV CODE 250 ALT 637 W/ HCPCS: Performed by: STUDENT IN AN ORGANIZED HEALTH CARE EDUCATION/TRAINING PROGRAM

## 2023-04-07 PROCEDURE — 25000003 PHARM REV CODE 250: Performed by: HOSPITALIST

## 2023-04-07 PROCEDURE — 25000003 PHARM REV CODE 250: Performed by: INTERNAL MEDICINE

## 2023-04-07 PROCEDURE — 25000003 PHARM REV CODE 250: Performed by: STUDENT IN AN ORGANIZED HEALTH CARE EDUCATION/TRAINING PROGRAM

## 2023-04-07 PROCEDURE — 25000242 PHARM REV CODE 250 ALT 637 W/ HCPCS: Performed by: HOSPITALIST

## 2023-04-07 PROCEDURE — A4216 STERILE WATER/SALINE, 10 ML: HCPCS | Performed by: INTERNAL MEDICINE

## 2023-04-07 PROCEDURE — 27100171 HC OXYGEN HIGH FLOW UP TO 24 HOURS

## 2023-04-07 PROCEDURE — 99900026 HC AIRWAY MAINTENANCE (STAT)

## 2023-04-07 PROCEDURE — 63600175 PHARM REV CODE 636 W HCPCS: Performed by: STUDENT IN AN ORGANIZED HEALTH CARE EDUCATION/TRAINING PROGRAM

## 2023-04-07 PROCEDURE — 21400001 HC TELEMETRY ROOM

## 2023-04-07 RX ORDER — IPRATROPIUM BROMIDE AND ALBUTEROL SULFATE 2.5; .5 MG/3ML; MG/3ML
3 SOLUTION RESPIRATORY (INHALATION)
Status: DISCONTINUED | OUTPATIENT
Start: 2023-04-07 | End: 2023-04-07

## 2023-04-07 RX ORDER — IPRATROPIUM BROMIDE AND ALBUTEROL SULFATE 2.5; .5 MG/3ML; MG/3ML
SOLUTION RESPIRATORY (INHALATION)
Status: DISPENSED
Start: 2023-04-07 | End: 2023-04-08

## 2023-04-07 RX ORDER — IPRATROPIUM BROMIDE AND ALBUTEROL SULFATE 2.5; .5 MG/3ML; MG/3ML
3 SOLUTION RESPIRATORY (INHALATION) EVERY 12 HOURS
Status: DISCONTINUED | OUTPATIENT
Start: 2023-04-07 | End: 2023-04-11

## 2023-04-07 RX ORDER — IPRATROPIUM BROMIDE AND ALBUTEROL SULFATE 2.5; .5 MG/3ML; MG/3ML
3 SOLUTION RESPIRATORY (INHALATION) EVERY 4 HOURS PRN
Status: DISCONTINUED | OUTPATIENT
Start: 2023-04-07 | End: 2023-04-30 | Stop reason: HOSPADM

## 2023-04-07 RX ADMIN — CLOPIDOGREL BISULFATE 75 MG: 75 TABLET ORAL at 08:04

## 2023-04-07 RX ADMIN — IPRATROPIUM BROMIDE AND ALBUTEROL SULFATE 3 ML: 2.5; .5 SOLUTION RESPIRATORY (INHALATION) at 08:04

## 2023-04-07 RX ADMIN — PANTOPRAZOLE SODIUM 40 MG: 40 INJECTION, POWDER, FOR SOLUTION INTRAVENOUS at 08:04

## 2023-04-07 RX ADMIN — ATORVASTATIN CALCIUM 80 MG: 40 TABLET, FILM COATED ORAL at 08:04

## 2023-04-07 RX ADMIN — ASPIRIN 81 MG CHEWABLE TABLET 81 MG: 81 TABLET CHEWABLE at 08:04

## 2023-04-07 RX ADMIN — Medication 10 ML: at 12:04

## 2023-04-07 RX ADMIN — GLYCOPYRROLATE 1 MG: 1 TABLET ORAL at 03:04

## 2023-04-07 RX ADMIN — LIDOCAINE 5% 2 PATCH: 700 PATCH TOPICAL at 12:04

## 2023-04-07 RX ADMIN — GLYCOPYRROLATE 1 MG: 1 TABLET ORAL at 08:04

## 2023-04-07 RX ADMIN — HYDROXYZINE HYDROCHLORIDE 25 MG: 25 TABLET ORAL at 08:04

## 2023-04-07 RX ADMIN — Medication 10 ML: at 05:04

## 2023-04-07 RX ADMIN — FERROUS SULFATE TAB 325 MG (65 MG ELEMENTAL FE) 1 EACH: 325 (65 FE) TAB at 08:04

## 2023-04-07 RX ADMIN — IPRATROPIUM BROMIDE AND ALBUTEROL SULFATE 3 ML: 2.5; .5 SOLUTION RESPIRATORY (INHALATION) at 07:04

## 2023-04-07 RX ADMIN — Medication 10 ML: at 06:04

## 2023-04-07 RX ADMIN — IPRATROPIUM BROMIDE AND ALBUTEROL SULFATE 3 ML: 2.5; .5 SOLUTION RESPIRATORY (INHALATION) at 05:04

## 2023-04-07 RX ADMIN — OXYCODONE HYDROCHLORIDE 5 MG: 5 TABLET ORAL at 08:04

## 2023-04-07 RX ADMIN — MELATONIN TAB 3 MG 9 MG: 3 TAB at 08:04

## 2023-04-07 RX ADMIN — GUAIFENESIN 400 MG: 200 SOLUTION ORAL at 05:04

## 2023-04-07 RX ADMIN — Medication 10 ML: at 11:04

## 2023-04-07 RX ADMIN — GUAIFENESIN 400 MG: 200 SOLUTION ORAL at 09:04

## 2023-04-07 RX ADMIN — METOPROLOL TARTRATE 25 MG: 25 TABLET, FILM COATED ORAL at 08:04

## 2023-04-07 NOTE — SUBJECTIVE & OBJECTIVE
Interval History:   No events overnight.   Became a bit more cooperative  HH is stable at this time  Stable on trach collar 35% FIO2.Consulted SW for rehab placement,not safe discharge home.    Review of Systems   Constitutional:  Positive for activity change and fatigue.   Cardiovascular:  Negative for chest pain.   Gastrointestinal:  Negative for abdominal pain.     Objective:     Vital Signs (Most Recent):  Temp: 97.7 °F (36.5 °C) (04/07/23 0730)  Pulse: 86 (04/07/23 0900)  Resp: (!) 29 (04/07/23 0900)  BP: (!) 145/81 (04/07/23 0900)  SpO2: (!) 88 % (04/07/23 0900)   Vital Signs (24h Range):  Temp:  [97.7 °F (36.5 °C)-98.5 °F (36.9 °C)] 97.7 °F (36.5 °C)  Pulse:  [] 86  Resp:  [20-39] 29  SpO2:  [87 %-100 %] 88 %  BP: (115-158)/(63-96) 145/81     Weight: 66.6 kg (146 lb 13.2 oz)  Body mass index is 22.32 kg/m².    Intake/Output Summary (Last 24 hours) at 4/7/2023 0918  Last data filed at 4/7/2023 0900  Gross per 24 hour   Intake 2610 ml   Output 750 ml   Net 1860 ml        Physical Exam  Vitals reviewed.   Constitutional:       General: He is not in acute distress.     Appearance: He is well-developed and normal weight. He is ill-appearing. He is not toxic-appearing or diaphoretic.   HENT:      Head: Normocephalic and atraumatic.   Eyes:      General: No scleral icterus.     Pupils: Pupils are equal, round, and reactive to light.   Neck:      Thyroid: No thyromegaly.   Cardiovascular:      Rate and Rhythm: Normal rate and regular rhythm.      Heart sounds: No murmur heard.    No gallop.   Pulmonary:      Effort: Pulmonary effort is normal. No respiratory distress.      Breath sounds: Normal breath sounds. No stridor.   Abdominal:      General: Bowel sounds are normal. There is no distension.      Palpations: Abdomen is soft.      Tenderness: There is no abdominal tenderness.   Musculoskeletal:         General: No deformity. Normal range of motion.      Cervical back: Normal range of motion.   Skin:      "General: Skin is warm.      Capillary Refill: Capillary refill takes less than 2 seconds.      Coloration: Skin is not jaundiced.      Findings: No bruising.   Neurological:      Mental Status: He is alert and oriented to person, place, and time.      Motor: Weakness present.      Gait: Gait abnormal.   Psychiatric:         Mood and Affect: Mood normal.         Behavior: Behavior normal.           Recent Results (from the past 24 hour(s))   POCT glucose    Collection Time: 04/06/23 12:13 PM   Result Value Ref Range    POCT Glucose 125 (H) 70 - 110 mg/dL   POCT glucose    Collection Time: 04/06/23  6:31 PM   Result Value Ref Range    POCT Glucose 137 (H) 70 - 110 mg/dL   POCT glucose    Collection Time: 04/06/23  8:21 PM   Result Value Ref Range    POCT Glucose 169 (H) 70 - 110 mg/dL   POCT glucose    Collection Time: 04/07/23  8:20 AM   Result Value Ref Range    POCT Glucose 141 (H) 70 - 110 mg/dL       Microbiology Results (last 7 days)       ** No results found for the last 168 hours. **             Imaging Results              X-Ray Chest AP Portable (Final result)  Result time 03/18/23 12:01:13      Final result by Mariano Soto MD (03/18/23 12:01:13)                   Impression:      No detrimental change when compared with 12/29/2022.      Electronically signed by: Mariano Soto MD  Date:    03/18/2023  Time:    12:01               Narrative:    EXAMINATION:  XR CHEST AP PORTABLE    CLINICAL HISTORY:  Provided history is "chest pain;  ".    TECHNIQUE:  One view of the chest.    COMPARISON:  12/29/2022.    FINDINGS:  Tracheostomy tube is present with the tip overlying the tracheal air column between the clavicular heads and sherine.  Cardiac silhouette is borderline enlarged and similar to prior study.  Atherosclerotic calcifications overlie the aortic arch.  Probable pulmonary emphysema.  Coarsened interstitial lung markings with bibasilar subsegmental atelectasis.  Small left and trace right pleural " effusions, similar to the prior study.  No pneumothorax.  Aeration is overall similar when compared with 12/29/2022. Probable percutaneous gastrostomy tube overlies the left upper quadrant of the abdomen.

## 2023-04-07 NOTE — PLAN OF CARE
Problem: Adult Inpatient Plan of Care  Goal: Plan of Care Review  Outcome: Ongoing, Progressing  Goal: Patient-Specific Goal (Individualized)  Outcome: Ongoing, Progressing  Goal: Absence of Hospital-Acquired Illness or Injury  Outcome: Ongoing, Progressing  Goal: Optimal Comfort and Wellbeing  Outcome: Ongoing, Progressing  Goal: Readiness for Transition of Care  Outcome: Ongoing, Progressing     Problem: Fall Injury Risk  Goal: Absence of Fall and Fall-Related Injury  Outcome: Ongoing, Progressing     Problem: Skin Injury Risk Increased  Goal: Skin Health and Integrity  Outcome: Ongoing, Progressing     Problem: Infection  Goal: Absence of Infection Signs and Symptoms  Outcome: Ongoing, Progressing     Problem: Coping Ineffective  Goal: Effective Coping  Outcome: Ongoing, Progressing     Problem: Impaired Wound Healing  Goal: Optimal Wound Healing  Outcome: Ongoing, Progressing     Problem: Communication Impairment (Artificial Airway)  Goal: Effective Communication  Outcome: Ongoing, Progressing     Problem: Device-Related Complication Risk (Artificial Airway)  Goal: Optimal Device Function  Outcome: Ongoing, Progressing     Problem: Skin and Tissue Injury (Artificial Airway)  Goal: Absence of Device-Related Skin or Tissue Injury  Outcome: Ongoing, Progressing     Problem: Aspiration (Enteral Nutrition)  Goal: Absence of Aspiration Signs and Symptoms  Outcome: Ongoing, Progressing     Problem: Device-Related Complication Risk (Enteral Nutrition)  Goal: Safe, Effective Therapy Delivery  Outcome: Ongoing, Progressing     Problem: Feeding Intolerance (Enteral Nutrition)  Goal: Feeding Tolerance  Outcome: Ongoing, Progressing     Problem: Mobility Impairment  Goal: Optimal Mobility  Outcome: Ongoing, Progressing

## 2023-04-07 NOTE — ASSESSMENT & PLAN NOTE
At home is on 5L O2 via trach and O2 sats typically in high 80s low 90s. Worsening respiration this admission due to aspiration of blood and likely food aspiration. Required ventilator from 3/21 to 3/23, subsequently weaned. Treated empirically for pneumonia. Respiratory cultures show bugs not susceptible to the antibiotics given, however given the overall clinical respiratory improvement will not restart antibiotics.   - pulm/crit, ENT following peripherally  - Currently on 10 liters trach collar  - diuresis held due to soft blood pressures and blood loss,    Was Stable on trach collar 40% FIO2.weaning of FIO2,Consulted SW for rehab placement,not safe discharge home.

## 2023-04-07 NOTE — PROGRESS NOTES
[facility] Medicine  Progress Note    Patient Name: Fareed Richard Jr.  MRN: [id number]  Patient Class: IP- Inpatient   Admission Date: 3/18/2023  Length of Stay: 19 days  Attending Physician: Santos Marlow MD  Primary Care Provider: Kodi Tubbs MD        Subjective:     Principal Problem:Acute blood loss anemia        HPI:  Fareed Richard Jr. 73 y.o. 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  Per his wife 4 days ago he began to have pink tinged sputum via his trach.  He was seen by his ENT 2 days ago with a scope exam and a trach exchange without evidence of bleeding.  This morning at 3:00 a.m. he developed bright red blood via trach which resolved without intervention.  It is since not recurred.  He takes a daily aspirin.  He receives all medications via G-tube.  His tube feeding regimen consists of 6 cans of Isosource daily with 120 cc free water boluses.  He denies fever chest pain shortness of breath nausea vomiting abdominal pain leg swelling syncope dizziness dysuria melena hematuria hematemesis.    In the ED, hemoglobin 7.6 INR 1.0 BUN 50 creatinine 0.7  troponin negative BRCA negative O positive type and screen chest x-ray without detrimental change hypotensive to 85/54.      Overview/Hospital Course:  74 yo M w squamous CA of tongue s/p glossectomy and reconstructive flap with trach, CAD, chronic hypoxic respiratory failure (on 5L at home) and with peg presented for eval of hemoptysis 2 days after trach change in clinic.  Transferred to ICU 3/19 when markedly hypotensive.  Unclear if due to hemorrhage, cardiogenic or septic shock.  Did require PRBC and TXA nebs but didn't seem like sufficient bleeding to cause shock.  Bleeding has stopped and ENT is on board and following.  CTA shows either significant mucus plugging or bibasilar pneumonia -  pulmonology favored pneumonia.  Also has urine culture growing Enterococcus.  He is on broad spectrum antibiotics. Troponin checked due to hypotension and it was 1.17.  Cards consulted and feel it's demand due to ischemia and hypotension and recommend outpatient stress. AM cortisol was low so ordered cosyntropin stim test which shows an adequate adrenal response to r/o adrenal insufficiency.  Developed severe shoulder pain and hypoxia, increasing troponin and pulmonary edema on CXR - shoulder pain likely anginal equivalent. Discussed with Cardiology, started heparin and nitro drip. Required nitro drip for chest pain. Tolerating heparin drip without evidence of further tracheostomy bleeding. Further discussions with Cardiology, and brought patient for LHC/angiogram on 3/23 which showed distal LM 30%, mild LAD 90% bifurcation lesion with D1, Cx mid 80%, RCA moderate diffuse disease with long 70% and some left to right collateral. All vessels heavily calcified. Not a candidate for CABG but plan for staged PCI. Continues on heparin drip, asa/plavix and tolerating. Off pressor support/nitro drip. No further bleeding and Hb remains stable. Had episodes of hypoxia/respiratory distress after vomiting and was intermittently placed on ventilator -  now back to trach collar close to home O2 requirements. Plan for angiogram/PCI on 3/27 with Dr. Bhatt.    Pt went for LHC w/ PCI to LAD and Lcx. Post operatively he was hemodynamically unstable and anemic. Stat CTA showed RP hematoma near L kidney without acute bleeding. Pt transfused supportively. He improved over the next few days, however still required periodic transfusion. On 3/31 he started coughing up blood from his trach and became hypotensive/tachycardic.    Given Hypotension of 86/51 and Hb goal 8-9 with NSTEMI/stent placement, another unit was given. Hb altagracia appropriately and BP now 142/80. Stable for step down or placement.   HH is stable at this time  Was Stable on  trach collar 40% FIO2.weaning down,  Consulted SW for rehab placement,not safe discharge home.      Interval History:   No events overnight.   Became a bit more cooperative  HH is stable at this time  Stable on trach collar 35% FIO2.Consulted SW for rehab placement,not safe discharge home.    Review of Systems   Constitutional:  Positive for activity change and fatigue.   Cardiovascular:  Negative for chest pain.   Gastrointestinal:  Negative for abdominal pain.     Objective:     Vital Signs (Most Recent):  Temp: 97.7 °F (36.5 °C) (04/07/23 0730)  Pulse: 86 (04/07/23 0900)  Resp: (!) 29 (04/07/23 0900)  BP: (!) 145/81 (04/07/23 0900)  SpO2: (!) 88 % (04/07/23 0900)   Vital Signs (24h Range):  Temp:  [97.7 °F (36.5 °C)-98.5 °F (36.9 °C)] 97.7 °F (36.5 °C)  Pulse:  [] 86  Resp:  [20-39] 29  SpO2:  [87 %-100 %] 88 %  BP: (115-158)/(63-96) 145/81     Weight: 66.6 kg (146 lb 13.2 oz)  Body mass index is 22.32 kg/m².    Intake/Output Summary (Last 24 hours) at 4/7/2023 0918  Last data filed at 4/7/2023 0900  Gross per 24 hour   Intake 2610 ml   Output 750 ml   Net 1860 ml        Physical Exam  Vitals reviewed.   Constitutional:       General: He is not in acute distress.     Appearance: He is well-developed and normal weight. He is ill-appearing. He is not toxic-appearing or diaphoretic.   HENT:      Head: Normocephalic and atraumatic.   Eyes:      General: No scleral icterus.     Pupils: Pupils are equal, round, and reactive to light.   Neck:      Thyroid: No thyromegaly.   Cardiovascular:      Rate and Rhythm: Normal rate and regular rhythm.      Heart sounds: No murmur heard.    No gallop.   Pulmonary:      Effort: Pulmonary effort is normal. No respiratory distress.      Breath sounds: Normal breath sounds. No stridor.   Abdominal:      General: Bowel sounds are normal. There is no distension.      Palpations: Abdomen is soft.      Tenderness: There is no abdominal tenderness.   Musculoskeletal:          "General: No deformity. Normal range of motion.      Cervical back: Normal range of motion.   Skin:     General: Skin is warm.      Capillary Refill: Capillary refill takes less than 2 seconds.      Coloration: Skin is not jaundiced.      Findings: No bruising.   Neurological:      Mental Status: He is alert and oriented to person, place, and time.      Motor: Weakness present.      Gait: Gait abnormal.   Psychiatric:         Mood and Affect: Mood normal.         Behavior: Behavior normal.           Recent Results (from the past 24 hour(s))   POCT glucose    Collection Time: 04/06/23 12:13 PM   Result Value Ref Range    POCT Glucose 125 (H) 70 - 110 mg/dL   POCT glucose    Collection Time: 04/06/23  6:31 PM   Result Value Ref Range    POCT Glucose 137 (H) 70 - 110 mg/dL   POCT glucose    Collection Time: 04/06/23  8:21 PM   Result Value Ref Range    POCT Glucose 169 (H) 70 - 110 mg/dL   POCT glucose    Collection Time: 04/07/23  8:20 AM   Result Value Ref Range    POCT Glucose 141 (H) 70 - 110 mg/dL       Microbiology Results (last 7 days)       ** No results found for the last 168 hours. **             Imaging Results              X-Ray Chest AP Portable (Final result)  Result time 03/18/23 12:01:13      Final result by Mariano Soto MD (03/18/23 12:01:13)                   Impression:      No detrimental change when compared with 12/29/2022.      Electronically signed by: Mariano Soto MD  Date:    03/18/2023  Time:    12:01               Narrative:    EXAMINATION:  XR CHEST AP PORTABLE    CLINICAL HISTORY:  Provided history is "chest pain;  ".    TECHNIQUE:  One view of the chest.    COMPARISON:  12/29/2022.    FINDINGS:  Tracheostomy tube is present with the tip overlying the tracheal air column between the clavicular heads and sherine.  Cardiac silhouette is borderline enlarged and similar to prior study.  Atherosclerotic calcifications overlie the aortic arch.  Probable pulmonary emphysema.  Coarsened " "interstitial lung markings with bibasilar subsegmental atelectasis.  Small left and trace right pleural effusions, similar to the prior study.  No pneumothorax.  Aeration is overall similar when compared with 12/29/2022. Probable percutaneous gastrostomy tube overlies the left upper quadrant of the abdomen.                                            Assessment/Plan:      * Acute blood loss anemia  Pt presented with hemoptysis. Scopes prior to admit and by ENT during admit not showing clear source. Bleeding improved. Pt underwent LHC for NSTEMI. After that procedure he was found to have a large retroperitoneal hematoma. Since starting DAPT he has also had recurrent hemoptysis as well as melanic stools. Trach cuff reinflated 4/2 with improvement in bleeding.  - trend CBC, transfuse prn  - empiric PPI for melena, however it appears this is blood from hemoptysis that was swallowed,  HH is stable at this time      Retroperitoneal hematoma  RP hematoma discovered after LHC. See "acute blood loss anemia"      NSTEMI (non-ST elevated myocardial infarction)  See "coronary artery disease"      Anxiety  Anxiety waxes and wanes with clinical status    UTI (urinary tract infection)  Urine culture growing Enterococcus > 100,000 cfu/ml, s/p appropriate treatment      Hemoptysis  -See acute blood loss anemia    Hypotension  Presented with hypotension and bleeding from tracheostomy/hemoptysis.  Patient does have lower blood pressure readings however this blood pressure was concerning in the 70s.  Patient did have some bleeding but did not suspect it was hemorrhagic.  Also thought to be septic due to possible UTI/pneumonia and being treated for antibiotics.  Also concerns for cardiogenic with an NSTEMI and being on nitro.  He was treated appropriately with antibiotics for his UTI/pneumonia and was also treated for NSTEMI.  Intermittently requiring pressor support with sedation after he was put on the ventilator.  Now on trach " collar.  Hypotension has now resolved and he is doing well.    Pt went into hemorrhagic shock on 3/27 w/ L RP bleed. No evidence of continued bleeding on CTA. Levophed weaned.        Elevated brain natriuretic peptide (BNP) level  Diuresis held due to soft blood pressures      PEG (percutaneous endoscopic gastrostomy) status  Continue medications via PEG. Does not take oral intake.   -On isosource 1.5 6 days daily with 120cc free water flushes via wife  -Will continue home tube feedings, with nutrition consulted    Tracheostomy present    Stable on trach collar 40% FIO2.    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   -TSH is normal and he is not on treatment as outpatient    ACP (advance care planning)  Appreciate palliative care involvement.      Acute on chronic respiratory failure with hypoxia  At home is on 5L O2 via trach and O2 sats typically in high 80s low 90s. Worsening respiration this admission due to aspiration of blood and likely food aspiration. Required ventilator from 3/21 to 3/23, subsequently weaned. Treated empirically for pneumonia. Respiratory cultures show bugs not susceptible to the antibiotics given, however given the overall clinical respiratory improvement will not restart antibiotics.   - pulm/crit, ENT following peripherally  - Currently on 10 liters trach collar  - diuresis held due to soft blood pressures and blood loss,    Was Stable on trach collar 40% FIO2.weaning of FIO2,Consulted SW for rehab placement,not safe discharge home.    Protein-calorie malnutrition  Nutrition consulted. Most recent weight and BMI monitored-                         Measurements:  Wt Readings from Last 1 Encounters:   04/05/23 97.8 kg (215 lb 9.8 oz)   Body mass index is 32.78 kg/m².    Recommendations: Recommendation/Intervention: 1. Continue with TF recs; Monitor diet advancements. 2. Advance diet as tolerated and appropriate. 3. Monitor weight changes; check weekly weights.  Goals:  1. Diet advancement by RD follow up.    Patient has been screened and assessed by RD. RD will follow patient.      COPD exacerbation  on treatment with nebulizer.      Other hyperlipidemia  -Continue home statin    Primary hypertension  -BP typically in the 90s to low 100s systolic per chart review.   -Noted hypotension 3/19 which is addressed above.  -Holding home metoprolol and amlodipine    Coronary artery disease involving native coronary artery of native heart with unstable angina pectoris  Troponin elevated when pt was upgraded to the ICU. At that time it was thought to be due to demand, however he had persistent chest pain concerning for ACS. Was started on heparin gtt and tolerated. Subsequently went to [facility] which showed diffuse disease. Pt not a candidate for CABG. He subsequently underwent PCI of LAD and LCx.  - DAPT  - holding metoprol due to soft pressures    Squamous cell cancer of tongue  Dx 2021, now s/p total glossectomy, bilateral neck dissection, bilateral cervical facial flaps and anterolateral thigh flap reconstruction of glossectomy defect 1/4/22. Completed adjuvant chemoradiation. Had trach changed by ENT 2 days prior to admit and scope at that time showed no signs of bleeding.       Carotid stenosis  On medical treatment .      Peripheral vascular disease  On medical treatment .        VTE Risk Mitigation (From admission, onward)         Ordered     IP VTE HIGH RISK PATIENT  Once         03/18/23 1620     Place sequential compression device  Until discontinued         03/18/23 1620     Place NIKITA hose  Until discontinued         03/18/23 1620                Discharge Planning   NISA:      Code Status: Full Code   Is the patient medically ready for discharge?:     Reason for patient still in hospital (select all that apply): Patient trending condition  Discharge Plan A: Rehab   Discharge Delays: None known at this time        Critical care time spent on the evaluation and treatment of severe organ  dysfunction, review of pertinent labs and imaging studies, discussions with consulting providers and discussions with patient/family: over 30  minutes.      Santos Marlow MD  Department of Hospital Medicine   [facility] - Intensive Care

## 2023-04-08 LAB
ANION GAP SERPL CALC-SCNC: 7 MMOL/L (ref 8–16)
BASOPHILS # BLD AUTO: 0.01 K/UL (ref 0–0.2)
BASOPHILS NFR BLD: 0.1 % (ref 0–1.9)
BUN SERPL-MCNC: 18 MG/DL (ref 8–23)
CALCIUM SERPL-MCNC: 8.6 MG/DL (ref 8.7–10.5)
CHLORIDE SERPL-SCNC: 100 MMOL/L (ref 95–110)
CO2 SERPL-SCNC: 34 MMOL/L (ref 23–29)
CREAT SERPL-MCNC: 0.7 MG/DL (ref 0.5–1.4)
DIFFERENTIAL METHOD: ABNORMAL
EOSINOPHIL # BLD AUTO: 0.2 K/UL (ref 0–0.5)
EOSINOPHIL NFR BLD: 1.8 % (ref 0–8)
ERYTHROCYTE [DISTWIDTH] IN BLOOD BY AUTOMATED COUNT: 17.9 % (ref 11.5–14.5)
EST. GFR  (NO RACE VARIABLE): >60 ML/MIN/1.73 M^2
GLUCOSE SERPL-MCNC: 122 MG/DL (ref 70–110)
HCT VFR BLD AUTO: 32.1 % (ref 40–54)
HGB BLD-MCNC: 9.7 G/DL (ref 14–18)
IMM GRANULOCYTES # BLD AUTO: 0.02 K/UL (ref 0–0.04)
IMM GRANULOCYTES NFR BLD AUTO: 0.2 % (ref 0–0.5)
LYMPHOCYTES # BLD AUTO: 0.4 K/UL (ref 1–4.8)
LYMPHOCYTES NFR BLD: 4.2 % (ref 18–48)
MCH RBC QN AUTO: 29.9 PG (ref 27–31)
MCHC RBC AUTO-ENTMCNC: 30.2 G/DL (ref 32–36)
MCV RBC AUTO: 99 FL (ref 82–98)
MONOCYTES # BLD AUTO: 0.6 K/UL (ref 0.3–1)
MONOCYTES NFR BLD: 7.1 % (ref 4–15)
NEUTROPHILS # BLD AUTO: 7.2 K/UL (ref 1.8–7.7)
NEUTROPHILS NFR BLD: 86.6 % (ref 38–73)
NRBC BLD-RTO: 0 /100 WBC
PLATELET # BLD AUTO: 177 K/UL (ref 150–450)
PMV BLD AUTO: 9.7 FL (ref 9.2–12.9)
POCT GLUCOSE: 141 MG/DL (ref 70–110)
POCT GLUCOSE: 154 MG/DL (ref 70–110)
POCT GLUCOSE: 179 MG/DL (ref 70–110)
POCT GLUCOSE: 181 MG/DL (ref 70–110)
POTASSIUM SERPL-SCNC: 4 MMOL/L (ref 3.5–5.1)
RBC # BLD AUTO: 3.24 M/UL (ref 4.6–6.2)
SODIUM SERPL-SCNC: 141 MMOL/L (ref 136–145)
WBC # BLD AUTO: 8.31 K/UL (ref 3.9–12.7)

## 2023-04-08 PROCEDURE — 27200966 HC CLOSED SUCTION SYSTEM

## 2023-04-08 PROCEDURE — A4216 STERILE WATER/SALINE, 10 ML: HCPCS | Performed by: INTERNAL MEDICINE

## 2023-04-08 PROCEDURE — 63600175 PHARM REV CODE 636 W HCPCS: Performed by: STUDENT IN AN ORGANIZED HEALTH CARE EDUCATION/TRAINING PROGRAM

## 2023-04-08 PROCEDURE — 99900035 HC TECH TIME PER 15 MIN (STAT)

## 2023-04-08 PROCEDURE — 94761 N-INVAS EAR/PLS OXIMETRY MLT: CPT

## 2023-04-08 PROCEDURE — 25000003 PHARM REV CODE 250: Performed by: INTERNAL MEDICINE

## 2023-04-08 PROCEDURE — 85025 COMPLETE CBC W/AUTO DIFF WBC: CPT | Performed by: HOSPITALIST

## 2023-04-08 PROCEDURE — 99900026 HC AIRWAY MAINTENANCE (STAT)

## 2023-04-08 PROCEDURE — C9113 INJ PANTOPRAZOLE SODIUM, VIA: HCPCS | Performed by: STUDENT IN AN ORGANIZED HEALTH CARE EDUCATION/TRAINING PROGRAM

## 2023-04-08 PROCEDURE — 27100171 HC OXYGEN HIGH FLOW UP TO 24 HOURS

## 2023-04-08 PROCEDURE — 25000003 PHARM REV CODE 250: Performed by: HOSPITALIST

## 2023-04-08 PROCEDURE — 25000003 PHARM REV CODE 250: Performed by: REGISTERED NURSE

## 2023-04-08 PROCEDURE — 25000003 PHARM REV CODE 250: Performed by: STUDENT IN AN ORGANIZED HEALTH CARE EDUCATION/TRAINING PROGRAM

## 2023-04-08 PROCEDURE — 63600175 PHARM REV CODE 636 W HCPCS: Performed by: INTERNAL MEDICINE

## 2023-04-08 PROCEDURE — 25000242 PHARM REV CODE 250 ALT 637 W/ HCPCS: Performed by: HOSPITALIST

## 2023-04-08 PROCEDURE — 94640 AIRWAY INHALATION TREATMENT: CPT

## 2023-04-08 PROCEDURE — 21400001 HC TELEMETRY ROOM

## 2023-04-08 PROCEDURE — 80048 BASIC METABOLIC PNL TOTAL CA: CPT | Performed by: HOSPITALIST

## 2023-04-08 RX ADMIN — Medication 10 ML: at 12:04

## 2023-04-08 RX ADMIN — HYDROXYZINE HYDROCHLORIDE 25 MG: 25 TABLET ORAL at 12:04

## 2023-04-08 RX ADMIN — MORPHINE SULFATE 2 MG: 4 INJECTION INTRAVENOUS at 12:04

## 2023-04-08 RX ADMIN — PANTOPRAZOLE SODIUM 40 MG: 40 INJECTION, POWDER, FOR SOLUTION INTRAVENOUS at 09:04

## 2023-04-08 RX ADMIN — ATORVASTATIN CALCIUM 80 MG: 40 TABLET, FILM COATED ORAL at 09:04

## 2023-04-08 RX ADMIN — GLYCOPYRROLATE 1 MG: 1 TABLET ORAL at 04:04

## 2023-04-08 RX ADMIN — Medication 10 ML: at 11:04

## 2023-04-08 RX ADMIN — ASPIRIN 81 MG CHEWABLE TABLET 81 MG: 81 TABLET CHEWABLE at 09:04

## 2023-04-08 RX ADMIN — CLOPIDOGREL BISULFATE 75 MG: 75 TABLET ORAL at 09:04

## 2023-04-08 RX ADMIN — GLYCOPYRROLATE 1 MG: 1 TABLET ORAL at 08:04

## 2023-04-08 RX ADMIN — MELATONIN TAB 3 MG 9 MG: 3 TAB at 08:04

## 2023-04-08 RX ADMIN — IPRATROPIUM BROMIDE AND ALBUTEROL SULFATE 3 ML: 2.5; .5 SOLUTION RESPIRATORY (INHALATION) at 01:04

## 2023-04-08 RX ADMIN — HYDROXYZINE HYDROCHLORIDE 25 MG: 25 TABLET ORAL at 08:04

## 2023-04-08 RX ADMIN — Medication 10 ML: at 06:04

## 2023-04-08 RX ADMIN — METOPROLOL TARTRATE 25 MG: 25 TABLET, FILM COATED ORAL at 08:04

## 2023-04-08 RX ADMIN — IPRATROPIUM BROMIDE AND ALBUTEROL SULFATE 3 ML: 2.5; .5 SOLUTION RESPIRATORY (INHALATION) at 07:04

## 2023-04-08 RX ADMIN — FERROUS SULFATE TAB 325 MG (65 MG ELEMENTAL FE) 1 EACH: 325 (65 FE) TAB at 09:04

## 2023-04-08 RX ADMIN — GLYCOPYRROLATE 1 MG: 1 TABLET ORAL at 09:04

## 2023-04-08 RX ADMIN — IPRATROPIUM BROMIDE AND ALBUTEROL SULFATE 3 ML: 2.5; .5 SOLUTION RESPIRATORY (INHALATION) at 08:04

## 2023-04-08 RX ADMIN — METOPROLOL TARTRATE 25 MG: 25 TABLET, FILM COATED ORAL at 09:04

## 2023-04-08 NOTE — SUBJECTIVE & OBJECTIVE
Interval History:   No events overnight.   Became a bit more cooperative  HH is stable at this time  Stable on trach collar 35% FIO2.Consulted SW for rehab placement,not safe discharge home.    Review of Systems   Constitutional:  Positive for activity change and fatigue.   Cardiovascular:  Negative for chest pain.   Gastrointestinal:  Negative for abdominal pain.     Objective:     Vital Signs (Most Recent):  Temp: 97.9 °F (36.6 °C) (04/08/23 0400)  Pulse: 97 (04/08/23 0419)  Resp: (!) 29 (04/08/23 0419)  BP: (!) 143/70 (04/08/23 0400)  SpO2: 100 % (04/08/23 0419)   Vital Signs (24h Range):  Temp:  [97.6 °F (36.4 °C)-98 °F (36.7 °C)] 97.9 °F (36.6 °C)  Pulse:  [] 97  Resp:  [22-44] 29  SpO2:  [87 %-100 %] 100 %  BP: (121-174)/(58-96) 143/70     Weight: 66.6 kg (146 lb 13.2 oz)  Body mass index is 22.32 kg/m².    Intake/Output Summary (Last 24 hours) at 4/8/2023 0721  Last data filed at 4/8/2023 0600  Gross per 24 hour   Intake 2610 ml   Output 650 ml   Net 1960 ml        Physical Exam  Vitals reviewed.   Constitutional:       General: He is not in acute distress.     Appearance: He is well-developed and normal weight. He is ill-appearing. He is not toxic-appearing or diaphoretic.   HENT:      Head: Normocephalic and atraumatic.   Eyes:      General: No scleral icterus.     Pupils: Pupils are equal, round, and reactive to light.   Neck:      Thyroid: No thyromegaly.   Cardiovascular:      Rate and Rhythm: Normal rate and regular rhythm.      Heart sounds: No murmur heard.    No gallop.   Pulmonary:      Effort: Pulmonary effort is normal. No respiratory distress.      Breath sounds: Normal breath sounds. No stridor.   Abdominal:      General: Bowel sounds are normal. There is no distension.      Palpations: Abdomen is soft.      Tenderness: There is no abdominal tenderness.   Musculoskeletal:         General: No deformity. Normal range of motion.      Cervical back: Normal range of motion.   Skin:     General:  Skin is warm.      Capillary Refill: Capillary refill takes less than 2 seconds.      Coloration: Skin is not jaundiced.      Findings: No bruising.   Neurological:      Mental Status: He is alert and oriented to person, place, and time.      Motor: Weakness present.      Gait: Gait abnormal.   Psychiatric:         Mood and Affect: Mood normal.         Behavior: Behavior normal.           Recent Results (from the past 24 hour(s))   POCT glucose    Collection Time: 04/07/23  8:20 AM   Result Value Ref Range    POCT Glucose 141 (H) 70 - 110 mg/dL   POCT glucose    Collection Time: 04/07/23 11:56 AM   Result Value Ref Range    POCT Glucose 148 (H) 70 - 110 mg/dL   POCT glucose    Collection Time: 04/07/23  6:00 PM   Result Value Ref Range    POCT Glucose 144 (H) 70 - 110 mg/dL   POCT glucose    Collection Time: 04/07/23  9:23 PM   Result Value Ref Range    POCT Glucose 154 (H) 70 - 110 mg/dL   CBC auto differential    Collection Time: 04/08/23  3:20 AM   Result Value Ref Range    WBC 8.31 3.90 - 12.70 K/uL    RBC 3.24 (L) 4.60 - 6.20 M/uL    Hemoglobin 9.7 (L) 14.0 - 18.0 g/dL    Hematocrit 32.1 (L) 40.0 - 54.0 %    MCV 99 (H) 82 - 98 fL    MCH 29.9 27.0 - 31.0 pg    MCHC 30.2 (L) 32.0 - 36.0 g/dL    RDW 17.9 (H) 11.5 - 14.5 %    Platelets 177 150 - 450 K/uL    MPV 9.7 9.2 - 12.9 fL    Immature Granulocytes 0.2 0.0 - 0.5 %    Gran # (ANC) 7.2 1.8 - 7.7 K/uL    Immature Grans (Abs) 0.02 0.00 - 0.04 K/uL    Lymph # 0.4 (L) 1.0 - 4.8 K/uL    Mono # 0.6 0.3 - 1.0 K/uL    Eos # 0.2 0.0 - 0.5 K/uL    Baso # 0.01 0.00 - 0.20 K/uL    nRBC 0 0 /100 WBC    Gran % 86.6 (H) 38.0 - 73.0 %    Lymph % 4.2 (L) 18.0 - 48.0 %    Mono % 7.1 4.0 - 15.0 %    Eosinophil % 1.8 0.0 - 8.0 %    Basophil % 0.1 0.0 - 1.9 %    Differential Method Automated    Basic metabolic panel    Collection Time: 04/08/23  3:20 AM   Result Value Ref Range    Sodium 141 136 - 145 mmol/L    Potassium 4.0 3.5 - 5.1 mmol/L    Chloride 100 95 - 110 mmol/L    CO2 34  "(H) 23 - 29 mmol/L    Glucose 122 (H) 70 - 110 mg/dL    BUN 18 8 - 23 mg/dL    Creatinine 0.7 0.5 - 1.4 mg/dL    Calcium 8.6 (L) 8.7 - 10.5 mg/dL    Anion Gap 7 (L) 8 - 16 mmol/L    eGFR >60 >60 mL/min/1.73 m^2       Microbiology Results (last 7 days)       ** No results found for the last 168 hours. **             Imaging Results              X-Ray Chest AP Portable (Final result)  Result time 03/18/23 12:01:13      Final result by Mariano Soto MD (03/18/23 12:01:13)                   Impression:      No detrimental change when compared with 12/29/2022.      Electronically signed by: Mariano Soto MD  Date:    03/18/2023  Time:    12:01               Narrative:    EXAMINATION:  XR CHEST AP PORTABLE    CLINICAL HISTORY:  Provided history is "chest pain;  ".    TECHNIQUE:  One view of the chest.    COMPARISON:  12/29/2022.    FINDINGS:  Tracheostomy tube is present with the tip overlying the tracheal air column between the clavicular heads and sherine.  Cardiac silhouette is borderline enlarged and similar to prior study.  Atherosclerotic calcifications overlie the aortic arch.  Probable pulmonary emphysema.  Coarsened interstitial lung markings with bibasilar subsegmental atelectasis.  Small left and trace right pleural effusions, similar to the prior study.  No pneumothorax.  Aeration is overall similar when compared with 12/29/2022. Probable percutaneous gastrostomy tube overlies the left upper quadrant of the abdomen.                                        "

## 2023-04-08 NOTE — CARE UPDATE
Pt's wife visited pt during shift. Pt slept on and off throughout shift. TF continued at goal and tolerated well. No BM during shift. Pt voided using urinal. Pt remains free from injury. Vs and assessment per flow sheet. Pt remains in ICU.

## 2023-04-08 NOTE — NURSING
[facility], [facility] - [place]  Nurses Note -- 4 Eyes      4/7/2023       Skin assessed on: Q Shift    [x]   No Pressure Injuries Present    [x]Prevention Measures Documented    [] Yes LDA  for Pressure Injury Previously documented     [] Yes New Pressure Injury Discovered   [] LDA for New Pressure Injury Added      Attending RN:  KAMILAH Nicholson     Second RN:  KAMILAH Mauro

## 2023-04-08 NOTE — PROGRESS NOTES
[facility] Medicine  Progress Note    Patient Name: Fareed Richard Jr.  MRN: [id number]  Patient Class: IP- Inpatient   Admission Date: 3/18/2023  Length of Stay: 20 days  Attending Physician: Santos Marlow MD  Primary Care Provider: Kodi Tubbs MD        Subjective:     Principal Problem:Acute blood loss anemia        HPI:  Fareed Richard Jr. 73 y.o. 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  Per his wife 4 days ago he began to have pink tinged sputum via his trach.  He was seen by his ENT 2 days ago with a scope exam and a trach exchange without evidence of bleeding.  This morning at 3:00 a.m. he developed bright red blood via trach which resolved without intervention.  It is since not recurred.  He takes a daily aspirin.  He receives all medications via G-tube.  His tube feeding regimen consists of 6 cans of Isosource daily with 120 cc free water boluses.  He denies fever chest pain shortness of breath nausea vomiting abdominal pain leg swelling syncope dizziness dysuria melena hematuria hematemesis.    In the ED, hemoglobin 7.6 INR 1.0 BUN 50 creatinine 0.7  troponin negative BRCA negative O positive type and screen chest x-ray without detrimental change hypotensive to 85/54.      Overview/Hospital Course:  74 yo M w squamous CA of tongue s/p glossectomy and reconstructive flap with trach, CAD, chronic hypoxic respiratory failure (on 5L at home) and with peg presented for eval of hemoptysis 2 days after trach change in clinic.  Transferred to ICU 3/19 when markedly hypotensive.  Unclear if due to hemorrhage, cardiogenic or septic shock.  Did require PRBC and TXA nebs but didn't seem like sufficient bleeding to cause shock.  Bleeding has stopped and ENT is on board and following.  CTA shows either significant mucus plugging or bibasilar pneumonia -  pulmonology favored pneumonia.  Also has urine culture growing Enterococcus.  He is on broad spectrum antibiotics. Troponin checked due to hypotension and it was 1.17.  Cards consulted and feel it's demand due to ischemia and hypotension and recommend outpatient stress. AM cortisol was low so ordered cosyntropin stim test which shows an adequate adrenal response to r/o adrenal insufficiency.  Developed severe shoulder pain and hypoxia, increasing troponin and pulmonary edema on CXR - shoulder pain likely anginal equivalent. Discussed with Cardiology, started heparin and nitro drip. Required nitro drip for chest pain. Tolerating heparin drip without evidence of further tracheostomy bleeding. Further discussions with Cardiology, and brought patient for LHC/angiogram on 3/23 which showed distal LM 30%, mild LAD 90% bifurcation lesion with D1, Cx mid 80%, RCA moderate diffuse disease with long 70% and some left to right collateral. All vessels heavily calcified. Not a candidate for CABG but plan for staged PCI. Continues on heparin drip, asa/plavix and tolerating. Off pressor support/nitro drip. No further bleeding and Hb remains stable. Had episodes of hypoxia/respiratory distress after vomiting and was intermittently placed on ventilator -  now back to trach collar close to home O2 requirements. Plan for angiogram/PCI on 3/27 with Dr. Bhatt.    Pt went for LHC w/ PCI to LAD and Lcx. Post operatively he was hemodynamically unstable and anemic. Stat CTA showed RP hematoma near L kidney without acute bleeding. Pt transfused supportively. He improved over the next few days, however still required periodic transfusion. On 3/31 he started coughing up blood from his trach and became hypotensive/tachycardic.    Given Hypotension of 86/51 and Hb goal 8-9 with NSTEMI/stent placement, another unit was given. Hb altagracia appropriately and BP now 142/80. Stable for step down or placement.   HH is stable at this time  Was Stable on  trach collar 40% FIO2.weaning down,  Consulted SW for rehab placement,not safe discharge home.      Interval History:   No events overnight.   Became a bit more cooperative  HH is stable at this time  Stable on trach collar 35% FIO2.Consulted SW for rehab placement,not safe discharge home.    Review of Systems   Constitutional:  Positive for activity change and fatigue.   Cardiovascular:  Negative for chest pain.   Gastrointestinal:  Negative for abdominal pain.     Objective:     Vital Signs (Most Recent):  Temp: 97.9 °F (36.6 °C) (04/08/23 0400)  Pulse: 97 (04/08/23 0419)  Resp: (!) 29 (04/08/23 0419)  BP: (!) 143/70 (04/08/23 0400)  SpO2: 100 % (04/08/23 0419)   Vital Signs (24h Range):  Temp:  [97.6 °F (36.4 °C)-98 °F (36.7 °C)] 97.9 °F (36.6 °C)  Pulse:  [] 97  Resp:  [22-44] 29  SpO2:  [87 %-100 %] 100 %  BP: (121-174)/(58-96) 143/70     Weight: 66.6 kg (146 lb 13.2 oz)  Body mass index is 22.32 kg/m².    Intake/Output Summary (Last 24 hours) at 4/8/2023 0721  Last data filed at 4/8/2023 0600  Gross per 24 hour   Intake 2610 ml   Output 650 ml   Net 1960 ml        Physical Exam  Vitals reviewed.   Constitutional:       General: He is not in acute distress.     Appearance: He is well-developed and normal weight. He is ill-appearing. He is not toxic-appearing or diaphoretic.   HENT:      Head: Normocephalic and atraumatic.   Eyes:      General: No scleral icterus.     Pupils: Pupils are equal, round, and reactive to light.   Neck:      Thyroid: No thyromegaly.   Cardiovascular:      Rate and Rhythm: Normal rate and regular rhythm.      Heart sounds: No murmur heard.    No gallop.   Pulmonary:      Effort: Pulmonary effort is normal. No respiratory distress.      Breath sounds: Normal breath sounds. No stridor.   Abdominal:      General: Bowel sounds are normal. There is no distension.      Palpations: Abdomen is soft.      Tenderness: There is no abdominal tenderness.   Musculoskeletal:         General: No  deformity. Normal range of motion.      Cervical back: Normal range of motion.   Skin:     General: Skin is warm.      Capillary Refill: Capillary refill takes less than 2 seconds.      Coloration: Skin is not jaundiced.      Findings: No bruising.   Neurological:      Mental Status: He is alert and oriented to person, place, and time.      Motor: Weakness present.      Gait: Gait abnormal.   Psychiatric:         Mood and Affect: Mood normal.         Behavior: Behavior normal.           Recent Results (from the past 24 hour(s))   POCT glucose    Collection Time: 04/07/23  8:20 AM   Result Value Ref Range    POCT Glucose 141 (H) 70 - 110 mg/dL   POCT glucose    Collection Time: 04/07/23 11:56 AM   Result Value Ref Range    POCT Glucose 148 (H) 70 - 110 mg/dL   POCT glucose    Collection Time: 04/07/23  6:00 PM   Result Value Ref Range    POCT Glucose 144 (H) 70 - 110 mg/dL   POCT glucose    Collection Time: 04/07/23  9:23 PM   Result Value Ref Range    POCT Glucose 154 (H) 70 - 110 mg/dL   CBC auto differential    Collection Time: 04/08/23  3:20 AM   Result Value Ref Range    WBC 8.31 3.90 - 12.70 K/uL    RBC 3.24 (L) 4.60 - 6.20 M/uL    Hemoglobin 9.7 (L) 14.0 - 18.0 g/dL    Hematocrit 32.1 (L) 40.0 - 54.0 %    MCV 99 (H) 82 - 98 fL    MCH 29.9 27.0 - 31.0 pg    MCHC 30.2 (L) 32.0 - 36.0 g/dL    RDW 17.9 (H) 11.5 - 14.5 %    Platelets 177 150 - 450 K/uL    MPV 9.7 9.2 - 12.9 fL    Immature Granulocytes 0.2 0.0 - 0.5 %    Gran # (ANC) 7.2 1.8 - 7.7 K/uL    Immature Grans (Abs) 0.02 0.00 - 0.04 K/uL    Lymph # 0.4 (L) 1.0 - 4.8 K/uL    Mono # 0.6 0.3 - 1.0 K/uL    Eos # 0.2 0.0 - 0.5 K/uL    Baso # 0.01 0.00 - 0.20 K/uL    nRBC 0 0 /100 WBC    Gran % 86.6 (H) 38.0 - 73.0 %    Lymph % 4.2 (L) 18.0 - 48.0 %    Mono % 7.1 4.0 - 15.0 %    Eosinophil % 1.8 0.0 - 8.0 %    Basophil % 0.1 0.0 - 1.9 %    Differential Method Automated    Basic metabolic panel    Collection Time: 04/08/23  3:20 AM   Result Value Ref Range     "Sodium 141 136 - 145 mmol/L    Potassium 4.0 3.5 - 5.1 mmol/L    Chloride 100 95 - 110 mmol/L    CO2 34 (H) 23 - 29 mmol/L    Glucose 122 (H) 70 - 110 mg/dL    BUN 18 8 - 23 mg/dL    Creatinine 0.7 0.5 - 1.4 mg/dL    Calcium 8.6 (L) 8.7 - 10.5 mg/dL    Anion Gap 7 (L) 8 - 16 mmol/L    eGFR >60 >60 mL/min/1.73 m^2       Microbiology Results (last 7 days)       ** No results found for the last 168 hours. **             Imaging Results              X-Ray Chest AP Portable (Final result)  Result time 03/18/23 12:01:13      Final result by Mariano Soto MD (03/18/23 12:01:13)                   Impression:      No detrimental change when compared with 12/29/2022.      Electronically signed by: Mariano Soto MD  Date:    03/18/2023  Time:    12:01               Narrative:    EXAMINATION:  XR CHEST AP PORTABLE    CLINICAL HISTORY:  Provided history is "chest pain;  ".    TECHNIQUE:  One view of the chest.    COMPARISON:  12/29/2022.    FINDINGS:  Tracheostomy tube is present with the tip overlying the tracheal air column between the clavicular heads and sherine.  Cardiac silhouette is borderline enlarged and similar to prior study.  Atherosclerotic calcifications overlie the aortic arch.  Probable pulmonary emphysema.  Coarsened interstitial lung markings with bibasilar subsegmental atelectasis.  Small left and trace right pleural effusions, similar to the prior study.  No pneumothorax.  Aeration is overall similar when compared with 12/29/2022. Probable percutaneous gastrostomy tube overlies the left upper quadrant of the abdomen.                                            Assessment/Plan:      * Acute blood loss anemia  Pt presented with hemoptysis. Scopes prior to admit and by ENT during admit not showing clear source. Bleeding improved. Pt underwent LHC for NSTEMI. After that procedure he was found to have a large retroperitoneal hematoma. Since starting DAPT he has also had recurrent hemoptysis as well as melanic " "stools. Trach cuff reinflated 4/2 with improvement in bleeding.  - trend CBC, transfuse prn  - empiric PPI for melena, however it appears this is blood from hemoptysis that was swallowed,  HH is stable at this time      Retroperitoneal hematoma  RP hematoma discovered after LHC. See "acute blood loss anemia"      NSTEMI (non-ST elevated myocardial infarction)  See "coronary artery disease"      Anxiety  Anxiety waxes and wanes with clinical status    UTI (urinary tract infection)  Urine culture growing Enterococcus > 100,000 cfu/ml, s/p appropriate treatment      Hemoptysis  -See acute blood loss anemia    Hypotension  Presented with hypotension and bleeding from tracheostomy/hemoptysis.  Patient does have lower blood pressure readings however this blood pressure was concerning in the 70s.  Patient did have some bleeding but did not suspect it was hemorrhagic.  Also thought to be septic due to possible UTI/pneumonia and being treated for antibiotics.  Also concerns for cardiogenic with an NSTEMI and being on nitro.  He was treated appropriately with antibiotics for his UTI/pneumonia and was also treated for NSTEMI.  Intermittently requiring pressor support with sedation after he was put on the ventilator.  Now on trach collar.  Hypotension has now resolved and he is doing well.    Pt went into hemorrhagic shock on 3/27 w/ L RP bleed. No evidence of continued bleeding on CTA. Levophed weaned.        Elevated brain natriuretic peptide (BNP) level  Diuresis held due to soft blood pressures      PEG (percutaneous endoscopic gastrostomy) status  Continue medications via PEG. Does not take oral intake.   -On isosource 1.5 6 days daily with 120cc free water flushes via wife  -Will continue home tube feedings, with nutrition consulted    Tracheostomy present    Stable on trach collar 40% FIO2.    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      Acute on chronic respiratory failure with hypoxia  At home is on 5L O2 via trach and O2 sats typically in high 80s low 90s. Worsening respiration this admission due to aspiration of blood and likely food aspiration. Required ventilator from 3/21 to 3/23, subsequently weaned. Treated empirically for pneumonia. Respiratory cultures show bugs not susceptible to the antibiotics given, however given the overall clinical respiratory improvement will not restart antibiotics.   - pulm/crit, ENT following peripherally  - Currently on 10 liters trach collar  - diuresis held due to soft blood pressures and blood loss,    Was Stable on trach collar 40% FIO2.weaning of FIO2,Consulted SW for rehab placement,not safe discharge home.    Protein-calorie malnutrition  Nutrition consulted. Most recent weight and BMI monitored-                         Measurements:  Wt Readings from Last 1 Encounters:   04/05/23 97.8 kg (215 lb 9.8 oz)   Body mass index is 32.78 kg/m².    Recommendations: Recommendation/Intervention: 1. Continue with TF recs; Monitor diet advancements. 2. Advance diet as tolerated and appropriate. 3. Monitor weight changes; check weekly weights.  Goals: 1. Diet advancement by RD follow up.    Patient has been screened and assessed by RD. RD will follow patient.      COPD exacerbation  on treatment with nebulizer.      Other hyperlipidemia  -Continue home statin    Primary hypertension  -BP typically in the 90s to low 100s systolic per chart review.   -Noted hypotension 3/19 which is addressed above.  -Holding home metoprolol and amlodipine    Coronary artery disease involving native coronary artery of native heart with unstable angina pectoris  Troponin elevated when pt was upgraded to the ICU. At that time it was thought to be due to demand, however he had persistent chest pain concerning for ACS. Was started on heparin gtt and tolerated.  Subsequently went to [facility] which showed diffuse disease. Pt not a candidate for CABG. He subsequently underwent PCI of LAD and LCx.  - DAPT  - holding metoprol due to soft pressures    Squamous cell cancer of tongue  Dx 2021, now s/p total glossectomy, bilateral neck dissection, bilateral cervical facial flaps and anterolateral thigh flap reconstruction of glossectomy defect 1/4/22. Completed adjuvant chemoradiation. Had trach changed by ENT 2 days prior to admit and scope at that time showed no signs of bleeding.       Carotid stenosis  On medical treatment .      Peripheral vascular disease  On medical treatment .        VTE Risk Mitigation (From admission, onward)         Ordered     IP VTE HIGH RISK PATIENT  Once         03/18/23 1620     Place sequential compression device  Until discontinued         03/18/23 1620     Place NIKITA hose  Until discontinued         03/18/23 1620                Discharge Planning   NISA:      Code Status: Full Code   Is the patient medically ready for discharge?:     Reason for patient still in hospital (select all that apply): Patient trending condition  Discharge Plan A: Rehab   Discharge Delays: None known at this time        Critical care time spent on the evaluation and treatment of severe organ dysfunction, review of pertinent labs and imaging studies, discussions with consulting providers and discussions with patient/family:  Over 30  minutes.      Santos Marlow MD  Department of Hospital Medicine   [facility] - Intensive Care

## 2023-04-08 NOTE — NURSING
[facility], [facility]  Nurses Note -- 4 Eyes      4/8/2023       Skin assessed on: Q Shift      [x] No Pressure Injuries Present    [x]Prevention Measures Documented    [] Yes LDA  for Pressure Injury Previously documented     [] Yes New Pressure Injury Discovered   [] LDA for New Pressure Injury Added      Attending RN:  Heidi Moreira RN     Second RN:  Lyn Mendez RN

## 2023-04-08 NOTE — NURSING
[facility], [facility] [place]  Nurses Note -- 4 Eyes      4/8/2023       Skin assessed on: Q Shift      [x] No Pressure Injuries Present    [x]Prevention Measures Documented    [] Yes LDA  for Pressure Injury Previously documented     [] Yes New Pressure Injury Discovered   [] LDA for New Pressure Injury Added      Attending RN:  Heidi Moreira RN     Second RN:  Lyn Mendez RN

## 2023-04-09 LAB
POCT GLUCOSE: 102 MG/DL (ref 70–110)
POCT GLUCOSE: 164 MG/DL (ref 70–110)
POCT GLUCOSE: 167 MG/DL (ref 70–110)

## 2023-04-09 PROCEDURE — 31720 CLEARANCE OF AIRWAYS: CPT

## 2023-04-09 PROCEDURE — 25000003 PHARM REV CODE 250: Performed by: INTERNAL MEDICINE

## 2023-04-09 PROCEDURE — 94640 AIRWAY INHALATION TREATMENT: CPT

## 2023-04-09 PROCEDURE — A4216 STERILE WATER/SALINE, 10 ML: HCPCS | Performed by: INTERNAL MEDICINE

## 2023-04-09 PROCEDURE — 94761 N-INVAS EAR/PLS OXIMETRY MLT: CPT

## 2023-04-09 PROCEDURE — C9113 INJ PANTOPRAZOLE SODIUM, VIA: HCPCS | Performed by: STUDENT IN AN ORGANIZED HEALTH CARE EDUCATION/TRAINING PROGRAM

## 2023-04-09 PROCEDURE — 63600175 PHARM REV CODE 636 W HCPCS: Performed by: STUDENT IN AN ORGANIZED HEALTH CARE EDUCATION/TRAINING PROGRAM

## 2023-04-09 PROCEDURE — 99900026 HC AIRWAY MAINTENANCE (STAT)

## 2023-04-09 PROCEDURE — 11000001 HC ACUTE MED/SURG PRIVATE ROOM

## 2023-04-09 PROCEDURE — 25000242 PHARM REV CODE 250 ALT 637 W/ HCPCS: Performed by: HOSPITALIST

## 2023-04-09 PROCEDURE — 27100171 HC OXYGEN HIGH FLOW UP TO 24 HOURS

## 2023-04-09 PROCEDURE — 99900035 HC TECH TIME PER 15 MIN (STAT)

## 2023-04-09 RX ADMIN — GLYCOPYRROLATE 1 MG: 1 TABLET ORAL at 08:04

## 2023-04-09 RX ADMIN — CLOPIDOGREL BISULFATE 75 MG: 75 TABLET ORAL at 09:04

## 2023-04-09 RX ADMIN — GLYCOPYRROLATE 1 MG: 1 TABLET ORAL at 09:04

## 2023-04-09 RX ADMIN — METOPROLOL TARTRATE 25 MG: 25 TABLET, FILM COATED ORAL at 09:04

## 2023-04-09 RX ADMIN — PANTOPRAZOLE SODIUM 40 MG: 40 INJECTION, POWDER, FOR SOLUTION INTRAVENOUS at 09:04

## 2023-04-09 RX ADMIN — ASPIRIN 81 MG CHEWABLE TABLET 81 MG: 81 TABLET CHEWABLE at 09:04

## 2023-04-09 RX ADMIN — IPRATROPIUM BROMIDE AND ALBUTEROL SULFATE 3 ML: 2.5; .5 SOLUTION RESPIRATORY (INHALATION) at 04:04

## 2023-04-09 RX ADMIN — MELATONIN TAB 3 MG 9 MG: 3 TAB at 08:04

## 2023-04-09 RX ADMIN — Medication 10 ML: at 06:04

## 2023-04-09 RX ADMIN — Medication 10 ML: at 03:04

## 2023-04-09 RX ADMIN — ATORVASTATIN CALCIUM 80 MG: 40 TABLET, FILM COATED ORAL at 09:04

## 2023-04-09 RX ADMIN — FERROUS SULFATE TAB 325 MG (65 MG ELEMENTAL FE) 1 EACH: 325 (65 FE) TAB at 09:04

## 2023-04-09 RX ADMIN — HYDROXYZINE HYDROCHLORIDE 25 MG: 25 TABLET ORAL at 08:04

## 2023-04-09 RX ADMIN — IPRATROPIUM BROMIDE AND ALBUTEROL SULFATE 3 ML: 2.5; .5 SOLUTION RESPIRATORY (INHALATION) at 08:04

## 2023-04-09 RX ADMIN — METOPROLOL TARTRATE 25 MG: 25 TABLET, FILM COATED ORAL at 08:04

## 2023-04-09 RX ADMIN — IPRATROPIUM BROMIDE AND ALBUTEROL SULFATE 3 ML: 2.5; .5 SOLUTION RESPIRATORY (INHALATION) at 12:04

## 2023-04-09 RX ADMIN — IPRATROPIUM BROMIDE AND ALBUTEROL SULFATE 3 ML: 2.5; .5 SOLUTION RESPIRATORY (INHALATION) at 09:04

## 2023-04-09 RX ADMIN — GLYCOPYRROLATE 1 MG: 1 TABLET ORAL at 03:04

## 2023-04-09 RX ADMIN — Medication 10 ML: at 12:04

## 2023-04-09 NOTE — PROGRESS NOTES
[facility] Medicine  Progress Note    Patient Name: Fareed Richard Jr.  MRN: [id number]  Patient Class: IP- Inpatient   Admission Date: 3/18/2023  Length of Stay: 21 days  Attending Physician: Santos Marlow MD  Primary Care Provider: Kodi Tubbs MD        Subjective:     Principal Problem:Acute blood loss anemia        HPI:  Fareed Richard Jr. 73 y.o. 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  Per his wife 4 days ago he began to have pink tinged sputum via his trach.  He was seen by his ENT 2 days ago with a scope exam and a trach exchange without evidence of bleeding.  This morning at 3:00 a.m. he developed bright red blood via trach which resolved without intervention.  It is since not recurred.  He takes a daily aspirin.  He receives all medications via G-tube.  His tube feeding regimen consists of 6 cans of Isosource daily with 120 cc free water boluses.  He denies fever chest pain shortness of breath nausea vomiting abdominal pain leg swelling syncope dizziness dysuria melena hematuria hematemesis.    In the ED, hemoglobin 7.6 INR 1.0 BUN 50 creatinine 0.7  troponin negative BRCA negative O positive type and screen chest x-ray without detrimental change hypotensive to 85/54.      Overview/Hospital Course:  74 yo M w squamous CA of tongue s/p glossectomy and reconstructive flap with trach, CAD, chronic hypoxic respiratory failure (on 5L at home) and with peg presented for eval of hemoptysis 2 days after trach change in clinic.  Transferred to ICU 3/19 when markedly hypotensive.  Unclear if due to hemorrhage, cardiogenic or septic shock.  Did require PRBC and TXA nebs but didn't seem like sufficient bleeding to cause shock.  Bleeding has stopped and ENT is on board and following.  CTA shows either significant mucus plugging or bibasilar pneumonia -  pulmonology favored pneumonia.  Also has urine culture growing Enterococcus.  He is on broad spectrum antibiotics. Troponin checked due to hypotension and it was 1.17.  Cards consulted and feel it's demand due to ischemia and hypotension and recommend outpatient stress. AM cortisol was low so ordered cosyntropin stim test which shows an adequate adrenal response to r/o adrenal insufficiency.  Developed severe shoulder pain and hypoxia, increasing troponin and pulmonary edema on CXR - shoulder pain likely anginal equivalent. Discussed with Cardiology, started heparin and nitro drip. Required nitro drip for chest pain. Tolerating heparin drip without evidence of further tracheostomy bleeding. Further discussions with Cardiology, and brought patient for LHC/angiogram on 3/23 which showed distal LM 30%, mild LAD 90% bifurcation lesion with D1, Cx mid 80%, RCA moderate diffuse disease with long 70% and some left to right collateral. All vessels heavily calcified. Not a candidate for CABG but plan for staged PCI. Continues on heparin drip, asa/plavix and tolerating. Off pressor support/nitro drip. No further bleeding and Hb remains stable. Had episodes of hypoxia/respiratory distress after vomiting and was intermittently placed on ventilator -  now back to trach collar close to home O2 requirements. Plan for angiogram/PCI on 3/27 with Dr. Bhatt.    Pt went for LHC w/ PCI to LAD and Lcx. Post operatively he was hemodynamically unstable and anemic. Stat CTA showed RP hematoma near L kidney without acute bleeding. Pt transfused supportively. He improved over the next few days, however still required periodic transfusion. On 3/31 he started coughing up blood from his trach and became hypotensive/tachycardic.    Given Hypotension of 86/51 and Hb goal 8-9 with NSTEMI/stent placement, another unit was given. Hb altagracia appropriately and BP now 142/80. Stable for step down or placement.   HH is stable at this time  Was Stable on  trach collar 35 % FIO2.weaning down,  Consulted SW for rehab placement,not safe discharge home.  Need frequent suctioning,remains full code,see by palliative in ICU.      Interval History:   No events overnight.   Became a bit more cooperative  HH is stable at this time  Stable on trach collar 35% FIO2.Consulted SW for rehab placement,not safe discharge home.    Review of Systems   Constitutional:  Positive for activity change and fatigue.   Cardiovascular:  Negative for chest pain.   Gastrointestinal:  Negative for abdominal pain.     Objective:     Vital Signs (Most Recent):  Temp: 98.2 °F (36.8 °C) (04/09/23 0639)  Pulse: 87 (04/09/23 0930)  Resp: 18 (04/09/23 0930)  BP: (!) 140/74 (04/09/23 0639)  SpO2: 98 % (04/09/23 0639)   Vital Signs (24h Range):  Temp:  [98 °F (36.7 °C)-98.5 °F (36.9 °C)] 98.2 °F (36.8 °C)  Pulse:  [85-95] 87  Resp:  [18-38] 18  SpO2:  [92 %-100 %] 98 %  BP: (113-140)/(59-78) 140/74     Weight: 66.6 kg (146 lb 13.2 oz)  Body mass index is 22.32 kg/m².    Intake/Output Summary (Last 24 hours) at 4/9/2023 1100  Last data filed at 4/9/2023 0825  Gross per 24 hour   Intake 1070 ml   Output 550 ml   Net 520 ml        Physical Exam  Vitals reviewed.   Constitutional:       General: He is not in acute distress.     Appearance: He is well-developed and normal weight. He is ill-appearing. He is not toxic-appearing or diaphoretic.   HENT:      Head: Normocephalic and atraumatic.   Eyes:      General: No scleral icterus.     Pupils: Pupils are equal, round, and reactive to light.   Neck:      Thyroid: No thyromegaly.   Cardiovascular:      Rate and Rhythm: Normal rate and regular rhythm.      Heart sounds: No murmur heard.    No gallop.   Pulmonary:      Effort: Pulmonary effort is normal. No respiratory distress.      Breath sounds: Normal breath sounds. No stridor.   Abdominal:      General: Bowel sounds are normal. There is no distension.      Palpations: Abdomen is soft.      Tenderness: There is  "no abdominal tenderness.   Musculoskeletal:         General: No deformity. Normal range of motion.      Cervical back: Normal range of motion.   Skin:     General: Skin is warm.      Capillary Refill: Capillary refill takes less than 2 seconds.      Coloration: Skin is not jaundiced.      Findings: No bruising.   Neurological:      Mental Status: He is alert and oriented to person, place, and time.      Motor: Weakness present.      Gait: Gait abnormal.   Psychiatric:         Mood and Affect: Mood normal.         Behavior: Behavior normal.           Recent Results (from the past 24 hour(s))   POCT glucose    Collection Time: 04/08/23 12:14 PM   Result Value Ref Range    POCT Glucose 154 (H) 70 - 110 mg/dL   POCT glucose    Collection Time: 04/08/23  5:33 PM   Result Value Ref Range    POCT Glucose 141 (H) 70 - 110 mg/dL   POCT glucose    Collection Time: 04/08/23  8:57 PM   Result Value Ref Range    POCT Glucose 179 (H) 70 - 110 mg/dL       Microbiology Results (last 7 days)       ** No results found for the last 168 hours. **             Imaging Results              X-Ray Chest AP Portable (Final result)  Result time 03/18/23 12:01:13      Final result by Mariano Soto MD (03/18/23 12:01:13)                   Impression:      No detrimental change when compared with 12/29/2022.      Electronically signed by: Mariano Soto MD  Date:    03/18/2023  Time:    12:01               Narrative:    EXAMINATION:  XR CHEST AP PORTABLE    CLINICAL HISTORY:  Provided history is "chest pain;  ".    TECHNIQUE:  One view of the chest.    COMPARISON:  12/29/2022.    FINDINGS:  Tracheostomy tube is present with the tip overlying the tracheal air column between the clavicular heads and sherine.  Cardiac silhouette is borderline enlarged and similar to prior study.  Atherosclerotic calcifications overlie the aortic arch.  Probable pulmonary emphysema.  Coarsened interstitial lung markings with bibasilar subsegmental atelectasis.  " "Small left and trace right pleural effusions, similar to the prior study.  No pneumothorax.  Aeration is overall similar when compared with 12/29/2022. Probable percutaneous gastrostomy tube overlies the left upper quadrant of the abdomen.                                            Assessment/Plan:      * Acute blood loss anemia  Pt presented with hemoptysis. Scopes prior to admit and by ENT during admit not showing clear source. Bleeding improved. Pt underwent LHC for NSTEMI. After that procedure he was found to have a large retroperitoneal hematoma. Since starting DAPT he has also had recurrent hemoptysis as well as melanic stools. Trach cuff reinflated 4/2 with improvement in bleeding.  - trend CBC, transfuse prn  - empiric PPI for melena, however it appears this is blood from hemoptysis that was swallowed,  HH is stable at this time      Retroperitoneal hematoma  RP hematoma discovered after LHC. See "acute blood loss anemia"      NSTEMI (non-ST elevated myocardial infarction)  See "coronary artery disease"      Anxiety  Anxiety waxes and wanes with clinical status    UTI (urinary tract infection)  Urine culture growing Enterococcus > 100,000 cfu/ml, s/p appropriate treatment      Hemoptysis  -See acute blood loss anemia    Hypotension  Presented with hypotension and bleeding from tracheostomy/hemoptysis.  Patient does have lower blood pressure readings however this blood pressure was concerning in the 70s.  Patient did have some bleeding but did not suspect it was hemorrhagic.  Also thought to be septic due to possible UTI/pneumonia and being treated for antibiotics.  Also concerns for cardiogenic with an NSTEMI and being on nitro.  He was treated appropriately with antibiotics for his UTI/pneumonia and was also treated for NSTEMI.  Intermittently requiring pressor support with sedation after he was put on the ventilator.  Now on trach collar.  Hypotension has now resolved and he is doing well.    Pt went into " hemorrhagic shock on 3/27 w/ L RP bleed. No evidence of continued bleeding on CTA. Levophed weaned.        Elevated brain natriuretic peptide (BNP) level  Diuresis held due to soft blood pressures      PEG (percutaneous endoscopic gastrostomy) status  Continue medications via PEG. Does not take oral intake.   -On isosource 1.5 6 days daily with 120cc free water flushes via wife  -Will continue home tube feedings, with nutrition consulted    Tracheostomy present    Stable on trach collar 40% FIO2.    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   -TSH is normal and he is not on treatment as outpatient    ACP (advance care planning)  Appreciate palliative care involvement.      Acute on chronic respiratory failure with hypoxia  At home is on 5L O2 via trach and O2 sats typically in high 80s low 90s. Worsening respiration this admission due to aspiration of blood and likely food aspiration. Required ventilator from 3/21 to 3/23, subsequently weaned. Treated empirically for pneumonia. Respiratory cultures show bugs not susceptible to the antibiotics given, however given the overall clinical respiratory improvement will not restart antibiotics.   - pulm/crit, ENT following peripherally  - Currently on 10 liters trach collar  - diuresis held due to soft blood pressures and blood loss,    Was Stable on trach collar 35 % FIO2.weaning of FIO2,Consulted SW for rehab placement,not safe discharge home.need a lot of suctioning.    Protein-calorie malnutrition  Nutrition consulted. Most recent weight and BMI monitored-                         Measurements:  Wt Readings from Last 1 Encounters:   04/05/23 97.8 kg (215 lb 9.8 oz)   Body mass index is 32.78 kg/m².    Recommendations: Recommendation/Intervention: 1. Continue with TF recs; Monitor diet advancements. 2. Advance diet as tolerated and appropriate. 3. Monitor weight changes; check weekly weights.  Goals: 1. Diet advancement by RD follow up.    Patient  has been screened and assessed by RD. RD will follow patient.      COPD exacerbation  on treatment with nebulizer.      Other hyperlipidemia  -Continue home statin    Primary hypertension  -BP typically in the 90s to low 100s systolic per chart review.   -Noted hypotension 3/19 which is addressed above.  -Holding home metoprolol and amlodipine    Coronary artery disease involving native coronary artery of native heart with unstable angina pectoris  Troponin elevated when pt was upgraded to the ICU. At that time it was thought to be due to demand, however he had persistent chest pain concerning for ACS. Was started on heparin gtt and tolerated. Subsequently went to [facility] which showed diffuse disease. Pt not a candidate for CABG. He subsequently underwent PCI of LAD and LCx.  - DAPT  - holding metoprol due to soft pressures    Squamous cell cancer of tongue  Dx 2021, now s/p total glossectomy, bilateral neck dissection, bilateral cervical facial flaps and anterolateral thigh flap reconstruction of glossectomy defect 1/4/22. Completed adjuvant chemoradiation. Had trach changed by ENT 2 days prior to admit and scope at that time showed no signs of bleeding.       Carotid stenosis  On medical treatment .      Peripheral vascular disease  On medical treatment .        VTE Risk Mitigation (From admission, onward)         Ordered     IP VTE HIGH RISK PATIENT  Once         03/18/23 1620     Place sequential compression device  Until discontinued         03/18/23 1620     Place NIKITA hose  Until discontinued         03/18/23 1620                Discharge Planning   NISA:      Code Status: Full Code   Is the patient medically ready for discharge?:     Reason for patient still in hospital (select all that apply): Patient trending condition  Discharge Plan A: Rehab   Discharge Delays: None known at this time              Santos Marlow MD  Department of Hospital Medicine   [facility]

## 2023-04-09 NOTE — NURSING
Pt arrived on unit via bed from ICU. Pt is awake alert and oriented. PICC ROSEANNE. No distress noted. Pt has a trach. Tele #[id number]. PEG. Vitals assessed. Call light within reach. Safety measures maintained. Will cont to monitor

## 2023-04-09 NOTE — ASSESSMENT & PLAN NOTE
At home is on 5L O2 via trach and O2 sats typically in high 80s low 90s. Worsening respiration this admission due to aspiration of blood and likely food aspiration. Required ventilator from 3/21 to 3/23, subsequently weaned. Treated empirically for pneumonia. Respiratory cultures show bugs not susceptible to the antibiotics given, however given the overall clinical respiratory improvement will not restart antibiotics.   - pulm/crit, ENT following peripherally  - Currently on 10 liters trach collar  - diuresis held due to soft blood pressures and blood loss,    Was Stable on trach collar 35 % FIO2.weaning of FIO2,Consulted SW for rehab placement,not safe discharge home.need a lot of suctioning.

## 2023-04-09 NOTE — SUBJECTIVE & OBJECTIVE
Interval History:   No events overnight.   Became a bit more cooperative  HH is stable at this time  Stable on trach collar 35% FIO2.Consulted SW for rehab placement,not safe discharge home.    Review of Systems   Constitutional:  Positive for activity change and fatigue.   Cardiovascular:  Negative for chest pain.   Gastrointestinal:  Negative for abdominal pain.     Objective:     Vital Signs (Most Recent):  Temp: 98.2 °F (36.8 °C) (04/09/23 0639)  Pulse: 87 (04/09/23 0930)  Resp: 18 (04/09/23 0930)  BP: (!) 140/74 (04/09/23 0639)  SpO2: 98 % (04/09/23 0639)   Vital Signs (24h Range):  Temp:  [98 °F (36.7 °C)-98.5 °F (36.9 °C)] 98.2 °F (36.8 °C)  Pulse:  [85-95] 87  Resp:  [18-38] 18  SpO2:  [92 %-100 %] 98 %  BP: (113-140)/(59-78) 140/74     Weight: 66.6 kg (146 lb 13.2 oz)  Body mass index is 22.32 kg/m².    Intake/Output Summary (Last 24 hours) at 4/9/2023 1100  Last data filed at 4/9/2023 0825  Gross per 24 hour   Intake 1070 ml   Output 550 ml   Net 520 ml        Physical Exam  Vitals reviewed.   Constitutional:       General: He is not in acute distress.     Appearance: He is well-developed and normal weight. He is ill-appearing. He is not toxic-appearing or diaphoretic.   HENT:      Head: Normocephalic and atraumatic.   Eyes:      General: No scleral icterus.     Pupils: Pupils are equal, round, and reactive to light.   Neck:      Thyroid: No thyromegaly.   Cardiovascular:      Rate and Rhythm: Normal rate and regular rhythm.      Heart sounds: No murmur heard.    No gallop.   Pulmonary:      Effort: Pulmonary effort is normal. No respiratory distress.      Breath sounds: Normal breath sounds. No stridor.   Abdominal:      General: Bowel sounds are normal. There is no distension.      Palpations: Abdomen is soft.      Tenderness: There is no abdominal tenderness.   Musculoskeletal:         General: No deformity. Normal range of motion.      Cervical back: Normal range of motion.   Skin:     General: Skin is  "warm.      Capillary Refill: Capillary refill takes less than 2 seconds.      Coloration: Skin is not jaundiced.      Findings: No bruising.   Neurological:      Mental Status: He is alert and oriented to person, place, and time.      Motor: Weakness present.      Gait: Gait abnormal.   Psychiatric:         Mood and Affect: Mood normal.         Behavior: Behavior normal.           Recent Results (from the past 24 hour(s))   POCT glucose    Collection Time: 04/08/23 12:14 PM   Result Value Ref Range    POCT Glucose 154 (H) 70 - 110 mg/dL   POCT glucose    Collection Time: 04/08/23  5:33 PM   Result Value Ref Range    POCT Glucose 141 (H) 70 - 110 mg/dL   POCT glucose    Collection Time: 04/08/23  8:57 PM   Result Value Ref Range    POCT Glucose 179 (H) 70 - 110 mg/dL       Microbiology Results (last 7 days)       ** No results found for the last 168 hours. **             Imaging Results              X-Ray Chest AP Portable (Final result)  Result time 03/18/23 12:01:13      Final result by Mariano Soto MD (03/18/23 12:01:13)                   Impression:      No detrimental change when compared with 12/29/2022.      Electronically signed by: Mariano Soto MD  Date:    03/18/2023  Time:    12:01               Narrative:    EXAMINATION:  XR CHEST AP PORTABLE    CLINICAL HISTORY:  Provided history is "chest pain;  ".    TECHNIQUE:  One view of the chest.    COMPARISON:  12/29/2022.    FINDINGS:  Tracheostomy tube is present with the tip overlying the tracheal air column between the clavicular heads and sherine.  Cardiac silhouette is borderline enlarged and similar to prior study.  Atherosclerotic calcifications overlie the aortic arch.  Probable pulmonary emphysema.  Coarsened interstitial lung markings with bibasilar subsegmental atelectasis.  Small left and trace right pleural effusions, similar to the prior study.  No pneumothorax.  Aeration is overall similar when compared with 12/29/2022. Probable " percutaneous gastrostomy tube overlies the left upper quadrant of the abdomen.

## 2023-04-09 NOTE — NURSING
[id number] Attempted to call report to receiving unit. Placed on hold then asked to call back. Nurse is in a patients  room.       [id number] Report given to receiving nurse. Awaiting transport   Pt transferred to room 421. Pt tolerated move well. No distress noted. Pt placed on wall o2. Nursing staff aware of pt's arrival.

## 2023-04-10 PROBLEM — N39.0 UTI (URINARY TRACT INFECTION): Status: RESOLVED | Noted: 2023-03-19 | Resolved: 2023-04-10

## 2023-04-10 PROBLEM — I95.9 HYPOTENSION: Status: RESOLVED | Noted: 2023-03-19 | Resolved: 2023-04-10

## 2023-04-10 PROBLEM — E43 SEVERE PROTEIN-CALORIE MALNUTRITION: Status: ACTIVE | Noted: 2022-02-16

## 2023-04-10 LAB
ANION GAP SERPL CALC-SCNC: 8 MMOL/L (ref 8–16)
BASOPHILS # BLD AUTO: 0.01 K/UL (ref 0–0.2)
BASOPHILS NFR BLD: 0.1 % (ref 0–1.9)
BUN SERPL-MCNC: 16 MG/DL (ref 8–23)
CALCIUM SERPL-MCNC: 8.8 MG/DL (ref 8.7–10.5)
CHLORIDE SERPL-SCNC: 99 MMOL/L (ref 95–110)
CO2 SERPL-SCNC: 33 MMOL/L (ref 23–29)
CREAT SERPL-MCNC: 0.7 MG/DL (ref 0.5–1.4)
DIFFERENTIAL METHOD: ABNORMAL
EOSINOPHIL # BLD AUTO: 0.1 K/UL (ref 0–0.5)
EOSINOPHIL NFR BLD: 1.3 % (ref 0–8)
ERYTHROCYTE [DISTWIDTH] IN BLOOD BY AUTOMATED COUNT: 18.1 % (ref 11.5–14.5)
EST. GFR  (NO RACE VARIABLE): >60 ML/MIN/1.73 M^2
GLUCOSE SERPL-MCNC: 146 MG/DL (ref 70–110)
HCT VFR BLD AUTO: 33.1 % (ref 40–54)
HGB BLD-MCNC: 9.7 G/DL (ref 14–18)
IMM GRANULOCYTES # BLD AUTO: 0.03 K/UL (ref 0–0.04)
IMM GRANULOCYTES NFR BLD AUTO: 0.4 % (ref 0–0.5)
LYMPHOCYTES # BLD AUTO: 0.2 K/UL (ref 1–4.8)
LYMPHOCYTES NFR BLD: 3.5 % (ref 18–48)
MCH RBC QN AUTO: 29 PG (ref 27–31)
MCHC RBC AUTO-ENTMCNC: 29.3 G/DL (ref 32–36)
MCV RBC AUTO: 99 FL (ref 82–98)
MONOCYTES # BLD AUTO: 0.5 K/UL (ref 0.3–1)
MONOCYTES NFR BLD: 7.1 % (ref 4–15)
NEUTROPHILS # BLD AUTO: 6.1 K/UL (ref 1.8–7.7)
NEUTROPHILS NFR BLD: 87.6 % (ref 38–73)
NRBC BLD-RTO: 0 /100 WBC
PLATELET # BLD AUTO: 203 K/UL (ref 150–450)
PMV BLD AUTO: 10.4 FL (ref 9.2–12.9)
POCT GLUCOSE: 128 MG/DL (ref 70–110)
POCT GLUCOSE: 133 MG/DL (ref 70–110)
POCT GLUCOSE: 150 MG/DL (ref 70–110)
POCT GLUCOSE: 158 MG/DL (ref 70–110)
POTASSIUM SERPL-SCNC: 3.7 MMOL/L (ref 3.5–5.1)
RBC # BLD AUTO: 3.35 M/UL (ref 4.6–6.2)
SODIUM SERPL-SCNC: 140 MMOL/L (ref 136–145)
WBC # BLD AUTO: 6.93 K/UL (ref 3.9–12.7)

## 2023-04-10 PROCEDURE — 94761 N-INVAS EAR/PLS OXIMETRY MLT: CPT

## 2023-04-10 PROCEDURE — 80048 BASIC METABOLIC PNL TOTAL CA: CPT | Performed by: HOSPITALIST

## 2023-04-10 PROCEDURE — 99900026 HC AIRWAY MAINTENANCE (STAT)

## 2023-04-10 PROCEDURE — 97530 THERAPEUTIC ACTIVITIES: CPT | Mod: CQ

## 2023-04-10 PROCEDURE — 25000003 PHARM REV CODE 250: Performed by: INTERNAL MEDICINE

## 2023-04-10 PROCEDURE — 25000003 PHARM REV CODE 250: Performed by: HOSPITALIST

## 2023-04-10 PROCEDURE — 99900035 HC TECH TIME PER 15 MIN (STAT)

## 2023-04-10 PROCEDURE — 97530 THERAPEUTIC ACTIVITIES: CPT

## 2023-04-10 PROCEDURE — C9113 INJ PANTOPRAZOLE SODIUM, VIA: HCPCS | Performed by: STUDENT IN AN ORGANIZED HEALTH CARE EDUCATION/TRAINING PROGRAM

## 2023-04-10 PROCEDURE — 36415 COLL VENOUS BLD VENIPUNCTURE: CPT | Performed by: HOSPITALIST

## 2023-04-10 PROCEDURE — 94640 AIRWAY INHALATION TREATMENT: CPT

## 2023-04-10 PROCEDURE — 85025 COMPLETE CBC W/AUTO DIFF WBC: CPT | Performed by: HOSPITALIST

## 2023-04-10 PROCEDURE — A4216 STERILE WATER/SALINE, 10 ML: HCPCS | Performed by: INTERNAL MEDICINE

## 2023-04-10 PROCEDURE — 97110 THERAPEUTIC EXERCISES: CPT | Mod: CQ

## 2023-04-10 PROCEDURE — 27000221 HC OXYGEN, UP TO 24 HOURS

## 2023-04-10 PROCEDURE — 97116 GAIT TRAINING THERAPY: CPT | Mod: CQ

## 2023-04-10 PROCEDURE — 63600175 PHARM REV CODE 636 W HCPCS: Performed by: STUDENT IN AN ORGANIZED HEALTH CARE EDUCATION/TRAINING PROGRAM

## 2023-04-10 PROCEDURE — 25000242 PHARM REV CODE 250 ALT 637 W/ HCPCS: Performed by: HOSPITALIST

## 2023-04-10 PROCEDURE — 11000001 HC ACUTE MED/SURG PRIVATE ROOM

## 2023-04-10 PROCEDURE — 97535 SELF CARE MNGMENT TRAINING: CPT

## 2023-04-10 RX ORDER — SULFAMETHOXAZOLE AND TRIMETHOPRIM 800; 160 MG/1; MG/1
1 TABLET ORAL 2 TIMES DAILY
Status: COMPLETED | OUTPATIENT
Start: 2023-04-10 | End: 2023-04-23

## 2023-04-10 RX ADMIN — CLOPIDOGREL BISULFATE 75 MG: 75 TABLET ORAL at 08:04

## 2023-04-10 RX ADMIN — Medication 10 ML: at 05:04

## 2023-04-10 RX ADMIN — ATORVASTATIN CALCIUM 80 MG: 40 TABLET, FILM COATED ORAL at 08:04

## 2023-04-10 RX ADMIN — PANTOPRAZOLE SODIUM 40 MG: 40 INJECTION, POWDER, FOR SOLUTION INTRAVENOUS at 08:04

## 2023-04-10 RX ADMIN — MELATONIN TAB 3 MG 9 MG: 3 TAB at 08:04

## 2023-04-10 RX ADMIN — GLYCOPYRROLATE 1 MG: 1 TABLET ORAL at 08:04

## 2023-04-10 RX ADMIN — METOPROLOL TARTRATE 25 MG: 25 TABLET, FILM COATED ORAL at 09:04

## 2023-04-10 RX ADMIN — GLYCOPYRROLATE 1 MG: 1 TABLET ORAL at 02:04

## 2023-04-10 RX ADMIN — GLYCOPYRROLATE 1 MG: 1 TABLET ORAL at 09:04

## 2023-04-10 RX ADMIN — IPRATROPIUM BROMIDE AND ALBUTEROL SULFATE 3 ML: 2.5; .5 SOLUTION RESPIRATORY (INHALATION) at 08:04

## 2023-04-10 RX ADMIN — METOPROLOL TARTRATE 25 MG: 25 TABLET, FILM COATED ORAL at 08:04

## 2023-04-10 RX ADMIN — Medication 10 ML: at 12:04

## 2023-04-10 RX ADMIN — SULFAMETHOXAZOLE AND TRIMETHOPRIM 1 TABLET: 800; 160 TABLET ORAL at 10:04

## 2023-04-10 RX ADMIN — TRAZODONE HYDROCHLORIDE 25 MG: 50 TABLET ORAL at 09:04

## 2023-04-10 RX ADMIN — SULFAMETHOXAZOLE AND TRIMETHOPRIM 1 TABLET: 800; 160 TABLET ORAL at 08:04

## 2023-04-10 RX ADMIN — FERROUS SULFATE TAB 325 MG (65 MG ELEMENTAL FE) 1 EACH: 325 (65 FE) TAB at 08:04

## 2023-04-10 RX ADMIN — ASPIRIN 81 MG CHEWABLE TABLET 81 MG: 81 TABLET CHEWABLE at 08:04

## 2023-04-10 NOTE — PLAN OF CARE
Problem: Occupational Therapy  Goal: Occupational Therapy Goal  Description: Goals to be met by: 4/19/23     Patient will increase functional independence with ADLs by performing:    UE Dressing with Modified [place] and Supervision.  LE Dressing with Modified [place] and Supervision.  Grooming while standing at sink with Stand-by Assistance and Assistive Devices as needed.  Toileting from toilet with Modified [place], Supervision, and Assistive Devices as needed for hygiene and clothing management.   Step transfer with Modified [place] and Supervision  Toilet transfer to toilet with Modified [place].   Upper extremity exercise program x10 reps per handout, with assistance as needed.    Outcome: Ongoing, Progressing

## 2023-04-10 NOTE — PT/OT/SLP PROGRESS
Medication refilled    2.   [facility] recommendations: Estella Vega MD. Databases online will have more trans health care options    3.   PAP/HPV done today, results by [facility], continue to get these exams every 3 years.    4.  Start mammograms at age 49.    5.  FITT test colon cancer screening materials given today, send in sample and repeat every year.   Physical Therapy Treatment    Patient Name:  Fareed Richard Jr.   MRN:  [id number]    Recommendations:     Discharge Recommendations:  (PT at next level of care)  Discharge Equipment Recommendations: none  Barriers to discharge: on 10L/40% trach collar with decreased SPO2 to 84% with minimal activity    Assessment:     Fareed Richard Jr. is a 73 y.o. male admitted with a medical diagnosis of Acute on chronic respiratory failure with hypoxia.  He presents with the following impairments/functional limitations: weakness, impaired endurance, impaired self care skills, impaired functional mobility, impaired balance, decreased coordination, decreased lower extremity function, decreased safety awareness, decreased ROM, impaired cardiopulmonary response to activity, gait instability, decreased upper extremity function .    Rehab Prognosis: Fair; patient would benefit from acute skilled PT services to address these deficits and reach maximum level of function.    Recent Surgery: Procedure(s) (LRB):  Angioplasty-coronary (Left)  Aortogram, Aortic Arch  AORTOGRAM, WITH SERIALOGRAPHY (N/A)  Stent, Coronary, Multi Vessel 14 Days Post-Op    Plan:     During this hospitalization, patient to be seen  (2-3x/wk) to address the identified rehab impairments via gait training, therapeutic activities, therapeutic exercises, neuromuscular re-education, wheelchair management/training and progress toward the following goals:    Plan of Care Expires:  04/12/23    Subjective     Chief Complaint: none   Patient/Family Comments/goals: pt is agreeable to therapy   Pain/Comfort:  Pain Rating 1: 0/10  Pain Rating Post-Intervention 1: 0/10      Objective:     Communicated with nurse prior to session.  Patient found HOB elevated with telemetry, bed alarm, oxygen, Tracheostomy, PICC line, PEG Tube, peripheral IV upon PT entry to room.     General Precautions: Standard, fall, respiratory  Orthopedic Precautions: N/A  Braces: N/A  Respiratory Status: on  10L/40% trach collar with decreased SPO2 to 84% with minimal activity. Pt required extended rest breaks throughout treatment  to recover to 90%      Functional Mobility:  Bed Mobility:     Rolling Left:  supervision  Scooting: supervision  Supine to Sit: stand by assistance , HOB elevated, bedside rail   Sit to Supine: stand by assistance, HOB elevated    Transfers:     Sit to Stand: X 2 trials from bed  stand by assistance with rolling walker. VC's for safety technique and walker management.   Gait: pt ambulated 4  steps fwd/bwd  , 6 side steps and 10 reps x 2 standing marching in placed with RW, CGA/SBA . Pt required seated rest breaks to recover SpO2 to 90%  .  Balance: good in sitting , fair in standing.       AM-PAC 6 CLICK MOBILITY  Turning over in bed (including adjusting bedclothes, sheets and blankets)?: 4  Sitting down on and standing up from a chair with arms (e.g., wheelchair, bedside commode, etc.): 3  Moving from lying on back to sitting on the side of the bed?: 4  Moving to and from a bed to a chair (including a wheelchair)?: 3  Need to walk in hospital room?: 3  Climbing 3-5 steps with a railing?: 3  Basic Mobility Total Score: 20       Treatment & Education:  Instructed pt to performed seated BLE x 10 reps : AP(10 reps x 2) , hip ABD/ADD .     Patient left with bed in chair position with BLE elevated, heels offloading  all lines intact, call button in reach, bed alarm on, and nurse notified..    GOALS:   Multidisciplinary Problems       Physical Therapy Goals          Problem: Physical Therapy    Goal Priority Disciplines Outcome Goal Variances Interventions   Physical Therapy Goal     PT, PT/OT Ongoing, Progressing     Description: Goals to be met by: 23     Patient will increase functional independence with mobility by performin. Supine to sit with Modified [place]  2. Sit to stand transfer with Supervision  3. Bed to chair transfer with Supervision   4. Gait  x 10-15 feet with  Contact Guard Assistance using Rolling Walker.   5. Lower extremity exercise program x10 reps per handout, with supervision  6. W/C propulsion x 25' with Supervision                         Time Tracking:     PT Received On: 04/10/23  PT Start Time: 1536     PT Stop Time: 1600  PT Total Time (min): 24 min     Billable Minutes: Gait Training 9 and Therapeutic Activity 15    Treatment Type: Treatment  PT/PTA: PTA     Number of PTA visits since last PT visit: 1     04/10/2023

## 2023-04-10 NOTE — ASSESSMENT & PLAN NOTE
Pt presented with hemoptysis. Scopes prior to admit and by ENT during admit not showing clear source. Bleeding improved. Pt underwent LHC for NSTEMI. After that procedure he was found to have a large retroperitoneal hematoma. Since starting DAPT he has also had recurrent hemoptysis as well as melanic stools. Trach cuff reinflated 4/2 with improvement in bleeding.  - trend CBC, transfuse prn  - Hgb now stable  - continue asa, plavix

## 2023-04-10 NOTE — RESPIRATORY THERAPY
Nurse called RT to inform them that the trach for the PT was found on the floor.  PT claims he was asleep at the time the trach came out.  RT came to bedside and replace the trach with a brand new one of the same size.  A new spare trach and supplies were replaced at the PT's bedside.

## 2023-04-10 NOTE — PT/OT/SLP PROGRESS
Occupational Therapy   Treatment    Name: Fareed Richard Jr.  MRN: [id number]  Admitting Diagnosis:  Acute blood loss anemia  14 Days Post-Op    Recommendations:     Discharge Recommendations: rehabilitation facility  Discharge Equipment Recommendations:   (TBD per IPR.)  Barriers to discharge:   (If d/c home, pt will required continues care/assistance due to weakness and risk of falls. Pt was mod I w/ ADLs and functional mobility.)    Assessment:     Fareed Richard Jr. is a 73 y.o. male with a medical diagnosis of Acute blood loss anemia.   Performance deficits affecting function are weakness, impaired endurance, impaired self care skills, impaired functional mobility, gait instability, decreased safety awareness, impaired cardiopulmonary response to activity, decreased ROM.     Pt pleasant and willing to participate in tx session this date, appearing less anxious and more comfortable;  pt w/ progress as he was able to ambulate short functional distance in room for performing functional tranfers and ADLs. Pt w/ SPO2 76-78% with exertion but recovered to ~80-82% w/ rest; HR in 80s. Pt tolerated tx session well and will continue to benefit from skilled acute OT services to maximize functional capacity for safe performance w/ ADLs and functional mobility.     Rehab Prognosis:  Good; patient would benefit from acute skilled OT services to address these deficits and reach maximum level of function.       Plan:     Patient to be seen 3 x/week, 5 x/week to address the above listed problems via self-care/home management, therapeutic activities, therapeutic exercises  Plan of Care Expires: 04/19/23  Plan of Care Reviewed with: patient    Subjective     Chief Complaint: Reported having a BM  Patient/Family Comments/goals: To get cleaned and transfer to chair   Pain/Comfort:  Pain Rating 1: 0/10  Pain Rating Post-Intervention 1: 0/10    Objective:     Communicated with: NurseMali, prior to session.  Patient found HOB elevated  with peripheral IV, Tracheostomy, blood pressure cuff, pulse ox (continuous), PICC line, telemetry upon OT entry to room.    General Precautions: Standard, fall, respiratory    Orthopedic Precautions:N/A  Braces: N/A  Respiratory Status: 10L, 40% FiO2 Trach Collar     Occupational Performance:     Bed Mobility:    Patient completed Scooting hips to EOB with stand by assistance  Patient completed Supine to Sit with stand by assistance and contact guard assistance w/ HOB elevated   Patient completed Sit to Supine with stand by assistance      Functional Mobility/Transfers:  Patient completed Sit <> Stand Transfer x 1 trials from EOB with contact guard assistance - stand by assistance with  rolling walker; pt was able to stand ~4 min for completing inez-care w/ BUE support in bed rails   Functional Mobility: Pt was able to ambulate from EOB>chair  w/ CGA-SBA and use of RW; pt w/ SPO2 76-77%, slowly recovering to 80-82%    Activities of Daily Living:  Upper Body Dressing: minimum assistance for donning gown over back   Toileting: total assistance for donning brief in standing w/ BUE support on bed rails in standing   Lower Body Dressing: total A for donning brief in standing w/ BUE support on RW      [facility] 6 Click ADL: 21    Treatment & Education:  -Pt performed ALD and functional mobility as noted above.   -Pt educated on safe functional mobility.   -Pt educated on importance of PLB and energy conservation strategies. Pt required increased time for recovering; coaching provided for implementing proper breathing technique.    -Questions and concerns addressed within scope.     Patient left up in chair with all lines intact and call button in reach    GOALS:   Multidisciplinary Problems       Occupational Therapy Goals          Problem: Occupational Therapy    Goal Priority Disciplines Outcome Interventions   Occupational Therapy Goal     OT, PT/OT Ongoing, Progressing    Description: Goals to be met by: 4/19/23      Patient will increase functional independence with ADLs by performing:    UE Dressing with Modified [place] and Supervision.  LE Dressing with Modified [place] and Supervision.  Grooming while standing at sink with Stand-by Assistance and Assistive Devices as needed.  Toileting from toilet with Modified [place], Supervision, and Assistive Devices as needed for hygiene and clothing management.   Step transfer with Modified [place] and Supervision  Toilet transfer to toilet with Modified [place].   Upper extremity exercise program x10 reps per handout, with assistance as needed.                         Time Tracking:     OT Date of Treatment: 04/10/23  OT Start Time: 0950  OT Stop Time: 1014  OT Total Time (min): 24 min    Billable Minutes:Self Care/Home Management 14  Therapeutic Activity 10  Total Time 24    OT/NELLY: OT          4/10/2023

## 2023-04-10 NOTE — PROGRESS NOTES
"[facility] - Med Surg  Adult Nutrition  Progress Note    SUMMARY       Recommendations    1. Continue with TF recs; Monitor diet advancements.   2. Advance diet as tolerated and appropriate.   3. Monitor weight changes; check weekly weights.    Goals: 1. Diet advancement by RD follow up.  Nutrition Goal Status: goal not met  Communication of RD Recs: other (comment) (POC)    Assessment and Plan    Nutrition Problem  Inadequate PO intake     Related to (etiology):   Cancer     Signs and Symptoms (as evidenced by):   Tube feed as a means of nutrition        Interventions/Recommendations (treatment strategy):  Collaboration with medical providers  TF recommendations      Nutrition Diagnosis Status:   Continues    Reason for Assessment    Reason For Assessment: RD follow-up  Diagnosis:  (acute blood loss anemia)  Relevant Medical History: cancer, COPD, hyperlipidemia  Interdisciplinary Rounds: did not attend  General Information Comments: RD follow up. Pt on TF isosource 1.5. Continue to advance TF to GR 40 mLhr as tolerated. Pt denies N/V/D/C. Pt has no recent significant weight changes. Continue to monitor for TF adavancements and diet advancements/tolerance.  Nutrition Discharge Planning: Pending medical course    Nutrition Risk Screen    Nutrition Risk Screen: tube feeding or parenteral nutrition    Nutrition/Diet History    Spiritual, Cultural Beliefs, Hindu Practices, Values that Affect Care: no  Food Allergies: NKFA    Anthropometrics    Temp: 97.1 °F (36.2 °C)  Height Method: Stated  Height: 5' 8" (172.7 cm)  Height (inches): 68 in  Weight Method: Bed Scale  Weight: 66.6 kg (146 lb 13.2 oz)  Weight (lb): 146.83 lb  Ideal Body Weight (IBW), Male: 154 lb  % Ideal Body Weight, Male (lb): 97.4 %  BMI (Calculated): 22.3  BMI Grade: 18.5-24.9 - normal       Lab/Procedures/Meds    Pertinent Labs Reviewed: reviewed  Pertinent Labs Comments: k 3.4, chloride 93, co2 41, anion gap 7, BUN 38, Glu 139, ca 8.6  Pertinent " Medications Reviewed: reviewed  Current Outpatient Medications   Medication Instructions    amLODIPine (NORVASC) 10 mg, Oral    atorvastatin (LIPITOR) 20 mg, Per G Tube, Daily    bisacodyL (DULCOLAX) 10 mg, Rectal, Daily PRN    clopidogreL (PLAVIX) 75 mg, Oral, Daily    glycopyrrolate (CUVPOSA) 1 mg/5 mL (0.2 mg/mL) Soln Take 5 mLs (1 mg total) by mouth 3 (three) times daily as needed (TO REDUCE SECRETIONS).    guaiFENesin 400 mg, Oral, Every 4 hours PRN    hydrOXYzine HCL (ATARAX) 25 MG tablet SMARTSI.5 Tablet(s) Gastro Tube Every 8 Hours PRN    melatonin (MELATIN) 6 mg, Per G Tube, Nightly    metoprolol succinate (TOPROL-XL) 200 mg, Oral, 2 times daily    multivit-min/FA/lycopen/lutein (CENTRUM SILVER MEN ORAL) Oral    omeprazole (PRILOSEC) 40 MG capsule Take 40 mg (contents of one capsule) twice daily through PEG tube. Mix contents of capsule with 10 mL applesauce.    oxyCODONE (ROXICODONE) 5 mg, Per G Tube, Every 4 hours PRN    polyethylene glycol (GLYCOLAX) 17 g, Per G Tube, 2 times daily    sodium chloride (OCEAN) 0.65 % nasal spray 1 spray, Nasal, As needed (PRN)    sodium chloride 3% 3 % nebulizer solution 4 mLs, Nebulization, 2 times daily    WIXELA INHUB 250-50 mcg/dose diskus inhaler SMARTSI Puff(s) By Mouth Every 12 Hours        Estimated/Assessed Needs    Weight Used For Calorie Calculations: 68 kg (150 lb)  Energy Calorie Requirements (kcal): 1533 kcal  Energy Need Method: [place]-St Jeor (x 1.1)  Protein Requirements: 68 g (1 g/kg)  Weight Used For Protein Calculations: 68 kg (150 lb)        RDA Method (mL): 1533         Nutrition Prescription Ordered    Current Diet Order: tube feed    Evaluation of Received Nutrient/Fluid Intake    I/O: +17L  Energy Calories Required: meeting needs  Protein Required: meeting needs  Fluid Required: meeting needs  Comments: LBM 4/10/23  Tolerance: tolerating  % Intake of Estimated Energy Needs: 25 - 50 %  % Meal Intake: NPO    Nutrition Risk    Level of  Risk/Frequency of Follow-up: low     Monitor and Evaluation    Food and Nutrient Intake: energy intake, food and beverage intake, enteral nutrition intake, parenteral nutrition intake  Food and Nutrient Adminstration: diet order, enteral and parenteral nutrition administration  Knowledge/Beliefs/Attitudes: food and nutrition knowledge/skill, beliefs and attitudes  Physical Activity and Function: nutrition-related ADLs and IADLs, factors affecting access to physical activity  Anthropometric Measurements: weight, weight change, body mass index  Biochemical Data, Medical Tests and Procedures: electrolyte and renal panel, gastrointestinal profile, glucose/endocrine profile, inflammatory profile, lipid profile  Nutrition-Focused Physical Findings: overall appearance     Nutrition Follow-Up    RD Follow-up?: Yes    Ambar Dutta, Registration Eligible, Provisional LDN

## 2023-04-10 NOTE — NURSING
[facility]  Nurses Note -- 4 Eyes      4/10/2023       Skin assessed on: Q Shift      [x] No Pressure Injuries Present    []Prevention Measures Documented    [] Yes LDA  for Pressure Injury Previously documented     [] Yes New Pressure Injury Discovered   [] LDA for New Pressure Injury Added      Attending RN:  Lida Mansfield RN     Second RN:  Richard Guerrier RN

## 2023-04-10 NOTE — NURSING
Nurses Note -- 4 Eyes      4/9/2023   07:16 PM      Skin assessed during: Q Shift Change      [] No Pressure Injuries Present    []Prevention Measures Documented      [] Yes- Altered Skin Integrity Present or Discovered   [] LDA Added if Not in Epic (Describe Wound)   [] New Altered Skin Integrity was Present on Admit and Documented in LDA   [] Wound Image Taken    Wound Care Consulted? No    Attending Nurse:  Richard Guerrier RN     Second RN/Staff Member:  Nette GARDUNO

## 2023-04-10 NOTE — CONSULTS
Plan A IPR. Spoke with pt's spouse, informed of recommendation for IPR. Pt's spouse stated [facility] would be first choice and [facility] second choice. Plan B Home health. Discussed option for [facility] to discuss palliative care, pt's spouse stated will continue with [facility] at at later time. [place] to continue to follow.        04/10/23 1200   Discharge Reassessment   Assessment Type Discharge Planning Reassessment   Did the patient's condition or plan change since previous assessment? Yes   Discharge Plan discussed with: Spouse/sig other   Name(s) and Number(s) Bridgett Richard (Spouse)   [phone]   Communicated NISA with patient/caregiver Date not available/Unable to determine   Discharge Plan A Rehab   Discharge Plan B Home Health;Home with family   DME Needed Upon Discharge  none   Discharge Barriers Identified None   Why the patient remains in the hospital Requires continued medical care   Post-Acute Status   Post-Acute Authorization Placement   Post-Acute Placement Status Pending medical clearance/testing   Patient choice form signed by patient/caregiver List from CMS Compare   Discharge Delays None known at this time

## 2023-04-10 NOTE — ASSESSMENT & PLAN NOTE
At home is on 5L O2 via trach and O2 sats typically in high 80s low 90s. Worsening respiration this admission due to aspiration of blood and likely food aspiration. Required ventilator from 3/21 to 3/23, subsequently weaned. Treated empirically for pneumonia. Respiratory cultures show Stenotrophomonas which was not treated  - start bactrim to treat Stenotrophomonas  - Currently on 10 liters trach collar- this is above home level

## 2023-04-10 NOTE — PROGRESS NOTES
[facility] Medicine  Progress Note    Patient Name: Fareed Richard Jr.  MRN: [id number]  Patient Class: IP- Inpatient   Admission Date: 3/18/2023  Length of Stay: 22 days  Attending Physician: Caprice Nava MD  Primary Care Provider: Kodi Tubbs MD        Subjective:     Principal Problem:Acute on chronic respiratory failure with hypoxia        HPI:  Fareed Richard Jr. 73 y.o. 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  Per his wife 4 days ago he began to have pink tinged sputum via his trach.  He was seen by his ENT 2 days ago with a scope exam and a trach exchange without evidence of bleeding.  This morning at 3:00 a.m. he developed bright red blood via trach which resolved without intervention.  It is since not recurred.  He takes a daily aspirin.  He receives all medications via G-tube.  His tube feeding regimen consists of 6 cans of Isosource daily with 120 cc free water boluses.  He denies fever chest pain shortness of breath nausea vomiting abdominal pain leg swelling syncope dizziness dysuria melena hematuria hematemesis.    In the ED, hemoglobin 7.6 INR 1.0 BUN 50 creatinine 0.7  troponin negative BRCA negative O positive type and screen chest x-ray without detrimental change hypotensive to 85/54.      Overview/Hospital Course:  74 yo M w squamous CA of tongue s/p glossectomy and reconstructive flap with trach, CAD, chronic hypoxic respiratory failure (on 5L at home) and with peg presented for evaluation of hemoptysis 2 days after tracheostomy change in clinic. Transferred to ICU 3/19 when markedly hypotensive.  Unclear if due to hemorrhage, cardiogenic or septic shock.  Did require PRBC and TXA nebs but didn't seem like sufficient bleeding to cause shock.  Bleeding stopped and ENT following.  CTA shows either significant mucus plugging or bibasilar  pneumonia - pulmonology favored pneumonia.  Trach aspirate with Stenotrophomonas, Achromobacter, and Candida. Also urine culture growing Enterococcus.  He is on broad spectrum antibiotics including bactrim. Developed NSTEMI. Cardiology consulted. [facility] 3/23 which showed distal LM 30%, mild LAD 90% bifurcation lesion with D1, Cx mid 80%, RCA moderate diffuse disease with long 70% and some left to right collateral. All vessels heavily calcified. Not a candidate for CABG but plan for staged PCI. Continued on heparin drip, asa/plavix. Vasopressors weaned. [facility] 3/27 PCI to LAD and LCx. Post operatively he was hemodynamically unstable and anemic. Stat CTA showed RP hematoma near L kidney without acute bleeding. Pt transfused supportively. He improved over the next few days. Stepped down to floor. Remains on trach collar, higher than home O2 requirements.       Interval History: No shortness of breath. Writes that he walked today. Still has secretions.     Review of Systems   Constitutional:  Negative for fever.   Respiratory:  Positive for cough. Negative for shortness of breath.    Cardiovascular:  Negative for chest pain.   Gastrointestinal:  Negative for abdominal pain.   Genitourinary:  Negative for difficulty urinating.   Neurological:  Positive for weakness.   Psychiatric/Behavioral:  Negative for confusion.    Objective:     Vital Signs (Most Recent):  Temp: 97.1 °F (36.2 °C) (04/10/23 1113)  Pulse: 71 (04/10/23 1113)  Resp: 19 (04/10/23 1113)  BP: (!) 160/73 (04/10/23 1113)  SpO2: (!) 93 % (04/10/23 1113)   Vital Signs (24h Range):  Temp:  [97.1 °F (36.2 °C)-98.7 °F (37.1 °C)] 97.1 °F (36.2 °C)  Pulse:  [] 71  Resp:  [18-20] 19  SpO2:  [90 %-93 %] 93 %  BP: (118-160)/(65-98) 160/73     Weight: 66.6 kg (146 lb 13.2 oz)  Body mass index is 22.32 kg/m².    Intake/Output Summary (Last 24 hours) at 4/10/2023 1538  Last data filed at 4/10/2023 1212  Gross per 24 hour   Intake 600 ml   Output 750 ml   Net -150 ml     "  Physical Exam  Vitals and nursing note reviewed.   Constitutional:       General: He is not in acute distress.     Appearance: He is ill-appearing. He is not toxic-appearing.   HENT:      Head: Normocephalic and atraumatic.      Comments: Temporal wasting     Nose: Nose normal.      Mouth/Throat:      Comments: edentulous  Eyes:      Conjunctiva/sclera: Conjunctivae normal.   Neck:      Comments: Trach in place, yellow secretions  Cardiovascular:      Rate and Rhythm: Normal rate and regular rhythm.   Pulmonary:      Effort: Pulmonary effort is normal. No respiratory distress.      Breath sounds: No wheezing or rales.      Comments: Coarse bilaterally. On trach collar 10 L  Abdominal:      General: Bowel sounds are normal.      Palpations: Abdomen is soft.      Comments: PEG in place   Musculoskeletal:      Right lower leg: No edema.      Left lower leg: No edema.      Comments: Muscular wasting   Skin:     General: Skin is warm and dry.   Neurological:      Mental Status: He is alert.       Significant Labs: All pertinent labs within the past 24 hours have been reviewed.    Significant Imaging: I have reviewed all pertinent imaging results/findings within the past 24 hours.      Assessment/Plan:      * Acute on chronic respiratory failure with hypoxia  At home is on 5L O2 via trach and O2 sats typically in high 80s low 90s. Worsening respiration this admission due to aspiration of blood and likely food aspiration. Required ventilator from 3/21 to 3/23, subsequently weaned. Treated empirically for pneumonia. Respiratory cultures show Stenotrophomonas which was not treated  - start bactrim to treat Stenotrophomonas  - Currently on 10 liters trach collar- this is above home level      Retroperitoneal hematoma  RP hematoma discovered after LHC. See "acute blood loss anemia"      NSTEMI (non-ST elevated myocardial infarction)  See "coronary artery disease"      Anxiety  Anxiety waxes and wanes with clinical " status    Hemoptysis  -See acute blood loss anemia    PEG (percutaneous endoscopic gastrostomy) status  Continue medications via PEG. Does not take oral intake.   -On isosource 1.5 6 days daily with 120cc free water flushes via wife  -Will continue home tube feedings, with nutrition consulted    Tracheostomy present  See respiratory failure     ACP (advance care planning)  Advance Care Planning     Date: 04/10/2023  Palliative consulted, reviewed documentation. Full code. Needs home palliative follow up upon discharge. Time spent reviewing on 4/10/23= 5 minutes          Severe protein-calorie malnutrition  Nutrition consulted. Most recent weight and BMI monitored-   Wt Readings from Last 3 Encounters:   04/07/23 0730 66.6 kg (146 lb 13.2 oz)   04/06/23 0715 65.7 kg (144 lb 13.5 oz)   04/05/23 0715 97.8 kg (215 lb 9.8 oz)   04/04/23 0715 97.3 kg (214 lb 8.1 oz)   04/03/23 0715 67 kg (147 lb 11.3 oz)   03/27/23 1324 68 kg (150 lb)   03/20/23 1130 68 kg (150 lb)   03/18/23 1747 68 kg (150 lb)   03/18/23 1028 68 kg (150 lb)   03/16/23 1600 65.8 kg (145 lb)   01/12/23 1414 65.3 kg (143 lb 15.4 oz)     Body mass index is 22.32 kg/m².     Recommendations: Recommendation/Intervention: 1. Continue with TF recs; Monitor diet advancements. 2. Advance diet as tolerated and appropriate. 3. Monitor weight changes; check weekly weights.  Goals: 1. Diet advancement by RD follow up.    Patient has been screened and assessed by RD. RD will follow patient.      Secondary malignant neoplasm of cervical lymph node  Noted      Acute blood loss anemia  Pt presented with hemoptysis. Scopes prior to admit and by ENT during admit not showing clear source. Bleeding improved. Pt underwent LHC for NSTEMI. After that procedure he was found to have a large retroperitoneal hematoma. Since starting DAPT he has also had recurrent hemoptysis as well as melanic stools. Trach cuff reinflated 4/2 with improvement in bleeding.  - trend CBC, transfuse  prn  - Hgb now stable  - continue asa, plavix     COPD exacerbation  This seems resolved  - continues on nebs BID      Other hyperlipidemia  -Continue home statin    Primary hypertension  Previously hypotensive, now hypertensive  - continue metoprolol  - if BP remains elevated, will add ARB for BP/CAD    Coronary artery disease involving native coronary artery of native heart with unstable angina pectoris  Troponin elevated when pt was upgraded to the ICU. At that time it was thought to be due to demand, however he had persistent chest pain concerning for ACS. Was started on heparin gtt and tolerated. Subsequently went to [facility] which showed diffuse disease. Pt not a candidate for CABG. He subsequently underwent PCI of LAD and LCx.  - Continue asa, plavix, statin, metoprolol      Squamous cell cancer of tongue  Dx 2021, now s/p total glossectomy, bilateral neck dissection, bilateral cervical facial flaps and anterolateral thigh flap reconstruction of glossectomy defect 1/4/22. Completed adjuvant chemoradiation. Had trach changed by ENT 2 days prior to admit and scope at that time showed no signs of bleeding despite hemoptysis present.       Carotid stenosis  Continues on asa, plavix, statin       Peripheral vascular disease  Continues on asa, plavix, statin         VTE Risk Mitigation (From admission, onward)           Ordered     IP VTE HIGH RISK PATIENT  Once         03/18/23 1620     Place sequential compression device  Until discontinued         03/18/23 1620     Place NIKITA hose  Until discontinued         03/18/23 1620                    Discharge Planning   NISA:      Code Status: Full Code   Is the patient medically ready for discharge?:     Reason for patient still in hospital (select all that apply): Patient trending condition and Pending disposition  Discharge Plan A: Rehab   Discharge Delays: None known at this time        4:11 PM Called wife Ms Richard to update her.       Caprice Nava MD  Department of  [facility] Surg

## 2023-04-10 NOTE — PLAN OF CARE
Recommendations    1. Continue with TF recs; Monitor diet advancements.   2. Advance diet as tolerated and appropriate.   3. Monitor weight changes; check weekly weights.    Goals: 1. Diet advancement by RD follow up.  Nutrition Goal Status: goal not met  Communication of RD Recs: other (comment) (POC)    Assessment and Plan    Nutrition Problem  Inadequate PO intake     Related to (etiology):   Cancer     Signs and Symptoms (as evidenced by):   Tube feed as a means of nutrition        Interventions/Recommendations (treatment strategy):  Collaboration with medical providers  TF recommendations      Nutrition Diagnosis Status:   Continues

## 2023-04-10 NOTE — ASSESSMENT & PLAN NOTE
Dx 2021, now s/p total glossectomy, bilateral neck dissection, bilateral cervical facial flaps and anterolateral thigh flap reconstruction of glossectomy defect 1/4/22. Completed adjuvant chemoradiation. Had trach changed by ENT 2 days prior to admit and scope at that time showed no signs of bleeding despite hemoptysis present.

## 2023-04-10 NOTE — NURSING
Pt found sleeping; trach on the floor; trach collar was located on the bedside table. RT notified. Trach replaced. No distress noted. Will continue to monitor.

## 2023-04-10 NOTE — SUBJECTIVE & OBJECTIVE
Interval History: No shortness of breath. Writes that he walked today. Still has secretions.     Review of Systems   Constitutional:  Negative for fever.   Respiratory:  Positive for cough. Negative for shortness of breath.    Cardiovascular:  Negative for chest pain.   Gastrointestinal:  Negative for abdominal pain.   Genitourinary:  Negative for difficulty urinating.   Neurological:  Positive for weakness.   Psychiatric/Behavioral:  Negative for confusion.    Objective:     Vital Signs (Most Recent):  Temp: 97.1 °F (36.2 °C) (04/10/23 1113)  Pulse: 71 (04/10/23 1113)  Resp: 19 (04/10/23 1113)  BP: (!) 160/73 (04/10/23 1113)  SpO2: (!) 93 % (04/10/23 1113)   Vital Signs (24h Range):  Temp:  [97.1 °F (36.2 °C)-98.7 °F (37.1 °C)] 97.1 °F (36.2 °C)  Pulse:  [] 71  Resp:  [18-20] 19  SpO2:  [90 %-93 %] 93 %  BP: (118-160)/(65-98) 160/73     Weight: 66.6 kg (146 lb 13.2 oz)  Body mass index is 22.32 kg/m².    Intake/Output Summary (Last 24 hours) at 4/10/2023 1538  Last data filed at 4/10/2023 1212  Gross per 24 hour   Intake 600 ml   Output 750 ml   Net -150 ml      Physical Exam  Vitals and nursing note reviewed.   Constitutional:       General: He is not in acute distress.     Appearance: He is ill-appearing. He is not toxic-appearing.   HENT:      Head: Normocephalic and atraumatic.      Comments: Temporal wasting     Nose: Nose normal.      Mouth/Throat:      Comments: edentulous  Eyes:      Conjunctiva/sclera: Conjunctivae normal.   Neck:      Comments: Trach in place, yellow secretions  Cardiovascular:      Rate and Rhythm: Normal rate and regular rhythm.   Pulmonary:      Effort: Pulmonary effort is normal. No respiratory distress.      Breath sounds: No wheezing or rales.      Comments: Coarse bilaterally. On trach collar 10 L  Abdominal:      General: Bowel sounds are normal.      Palpations: Abdomen is soft.      Comments: PEG in place   Musculoskeletal:      Right lower leg: No edema.      Left lower  leg: No edema.      Comments: Muscular wasting   Skin:     General: Skin is warm and dry.   Neurological:      Mental Status: He is alert.       Significant Labs: All pertinent labs within the past 24 hours have been reviewed.    Significant Imaging: I have reviewed all pertinent imaging results/findings within the past 24 hours.

## 2023-04-10 NOTE — ASSESSMENT & PLAN NOTE
Previously hypotensive, now hypertensive  - continue metoprolol  - if BP remains elevated, will add ARB for BP/CAD

## 2023-04-10 NOTE — ASSESSMENT & PLAN NOTE
Nutrition consulted. Most recent weight and BMI monitored-   Wt Readings from Last 3 Encounters:   04/07/23 0730 66.6 kg (146 lb 13.2 oz)   04/06/23 0715 65.7 kg (144 lb 13.5 oz)   04/05/23 0715 97.8 kg (215 lb 9.8 oz)   04/04/23 0715 97.3 kg (214 lb 8.1 oz)   04/03/23 0715 67 kg (147 lb 11.3 oz)   03/27/23 1324 68 kg (150 lb)   03/20/23 1130 68 kg (150 lb)   03/18/23 1747 68 kg (150 lb)   03/18/23 1028 68 kg (150 lb)   03/16/23 1600 65.8 kg (145 lb)   01/12/23 1414 65.3 kg (143 lb 15.4 oz)     Body mass index is 22.32 kg/m².     Recommendations: Recommendation/Intervention: 1. Continue with TF recs; Monitor diet advancements. 2. Advance diet as tolerated and appropriate. 3. Monitor weight changes; check weekly weights.  Goals: 1. Diet advancement by RD follow up.    Patient has been screened and assessed by RD. RD will follow patient.

## 2023-04-10 NOTE — ASSESSMENT & PLAN NOTE
Troponin elevated when pt was upgraded to the ICU. At that time it was thought to be due to demand, however he had persistent chest pain concerning for ACS. Was started on heparin gtt and tolerated. Subsequently went to [facility] which showed diffuse disease. Pt not a candidate for CABG. He subsequently underwent PCI of LAD and LCx.  - Continue asa, plavix, statin, metoprolol

## 2023-04-10 NOTE — ASSESSMENT & PLAN NOTE
Advance Care Planning     Date: 04/10/2023  Palliative consulted, reviewed documentation. Full code. Needs home palliative follow up upon discharge. Time spent reviewing on 4/10/23= 5 minutes

## 2023-04-10 NOTE — ASSESSMENT & PLAN NOTE
Lab Results   Component Value Date    TSH 2.787 03/19/2023     -TSH is normal and he is not on treatment as outpatient

## 2023-04-11 PROBLEM — J96.22 ACUTE ON CHRONIC RESPIRATORY FAILURE WITH HYPOXIA AND HYPERCAPNIA: Status: ACTIVE | Noted: 2022-03-14

## 2023-04-11 PROBLEM — R57.9 SHOCK, UNSPECIFIED: Status: ACTIVE | Noted: 2023-04-11

## 2023-04-11 LAB
ALBUMIN SERPL BCP-MCNC: 2.3 G/DL (ref 3.5–5.2)
ALLENS TEST: ABNORMAL
ALLENS TEST: ABNORMAL
ALP SERPL-CCNC: 112 U/L (ref 55–135)
ALT SERPL W/O P-5'-P-CCNC: 22 U/L (ref 10–44)
ANION GAP SERPL CALC-SCNC: 4 MMOL/L (ref 8–16)
AST SERPL-CCNC: 29 U/L (ref 10–40)
BACTERIA #/AREA URNS HPF: NORMAL /HPF
BASOPHILS # BLD AUTO: 0.02 K/UL (ref 0–0.2)
BASOPHILS NFR BLD: 0.3 % (ref 0–1.9)
BILIRUB SERPL-MCNC: 0.5 MG/DL (ref 0.1–1)
BILIRUB UR QL STRIP: NEGATIVE
BUN SERPL-MCNC: 15 MG/DL (ref 8–23)
CALCIUM SERPL-MCNC: 8.7 MG/DL (ref 8.7–10.5)
CHLORIDE SERPL-SCNC: 101 MMOL/L (ref 95–110)
CLARITY UR: CLEAR
CO2 SERPL-SCNC: 35 MMOL/L (ref 23–29)
COLOR UR: YELLOW
CREAT SERPL-MCNC: 0.8 MG/DL (ref 0.5–1.4)
DELSYS: ABNORMAL
DELSYS: ABNORMAL
DIFFERENTIAL METHOD: ABNORMAL
EOSINOPHIL # BLD AUTO: 0.1 K/UL (ref 0–0.5)
EOSINOPHIL NFR BLD: 1.2 % (ref 0–8)
ERYTHROCYTE [DISTWIDTH] IN BLOOD BY AUTOMATED COUNT: 18 % (ref 11.5–14.5)
ERYTHROCYTE [SEDIMENTATION RATE] IN BLOOD BY WESTERGREN METHOD: 18 MM/H
EST. GFR  (NO RACE VARIABLE): >60 ML/MIN/1.73 M^2
FIO2: 60
FIO2: 80
FLOW: 10
GLUCOSE SERPL-MCNC: 174 MG/DL (ref 70–110)
GLUCOSE UR QL STRIP: NEGATIVE
HCO3 UR-SCNC: 40.6 MMOL/L (ref 24–28)
HCO3 UR-SCNC: 40.9 MMOL/L (ref 24–28)
HCT VFR BLD AUTO: 31.2 % (ref 40–54)
HGB BLD-MCNC: 9.4 G/DL (ref 14–18)
HGB UR QL STRIP: NEGATIVE
HYALINE CASTS #/AREA URNS LPF: 0 /LPF
IMM GRANULOCYTES # BLD AUTO: 0.03 K/UL (ref 0–0.04)
IMM GRANULOCYTES NFR BLD AUTO: 0.4 % (ref 0–0.5)
KETONES UR QL STRIP: NEGATIVE
LACTATE SERPL-SCNC: 0.5 MMOL/L (ref 0.5–2.2)
LEUKOCYTE ESTERASE UR QL STRIP: NEGATIVE
LYMPHOCYTES # BLD AUTO: 0.3 K/UL (ref 1–4.8)
LYMPHOCYTES NFR BLD: 4.1 % (ref 18–48)
MCH RBC QN AUTO: 30.1 PG (ref 27–31)
MCHC RBC AUTO-ENTMCNC: 30.1 G/DL (ref 32–36)
MCV RBC AUTO: 100 FL (ref 82–98)
MICROSCOPIC COMMENT: NORMAL
MODE: ABNORMAL
MODE: ABNORMAL
MONOCYTES # BLD AUTO: 0.5 K/UL (ref 0.3–1)
MONOCYTES NFR BLD: 7.5 % (ref 4–15)
NEUTROPHILS # BLD AUTO: 5.8 K/UL (ref 1.8–7.7)
NEUTROPHILS NFR BLD: 86.5 % (ref 38–73)
NITRITE UR QL STRIP: NEGATIVE
NRBC BLD-RTO: 0 /100 WBC
PCO2 BLDA: 90.9 MMHG (ref 35–45)
PCO2 BLDA: 98.2 MMHG (ref 35–45)
PEEP: 5
PH SMN: 7.22 [PH] (ref 7.35–7.45)
PH SMN: 7.26 [PH] (ref 7.35–7.45)
PH UR STRIP: 7 [PH] (ref 5–8)
PLATELET # BLD AUTO: 201 K/UL (ref 150–450)
PMV BLD AUTO: 9.8 FL (ref 9.2–12.9)
PO2 BLDA: 31 MMHG (ref 80–100)
PO2 BLDA: 56 MMHG (ref 40–60)
POC BE: 10 MMOL/L
POC BE: 11 MMOL/L
POC SATURATED O2: 46 % (ref 95–100)
POC SATURATED O2: 80 % (ref 95–100)
POC TCO2: 44 MMOL/L (ref 23–27)
POC TCO2: 44 MMOL/L (ref 24–29)
POCT GLUCOSE: 143 MG/DL (ref 70–110)
POCT GLUCOSE: 173 MG/DL (ref 70–110)
POCT GLUCOSE: 232 MG/DL (ref 70–110)
POTASSIUM SERPL-SCNC: 4.2 MMOL/L (ref 3.5–5.1)
PROT SERPL-MCNC: 5.7 G/DL (ref 6–8.4)
PROT UR QL STRIP: ABNORMAL
RBC # BLD AUTO: 3.12 M/UL (ref 4.6–6.2)
RBC #/AREA URNS HPF: 0 /HPF (ref 0–4)
SAMPLE: ABNORMAL
SAMPLE: ABNORMAL
SITE: ABNORMAL
SITE: ABNORMAL
SODIUM SERPL-SCNC: 140 MMOL/L (ref 136–145)
SP GR UR STRIP: 1.01 (ref 1–1.03)
TROPONIN I SERPL DL<=0.01 NG/ML-MCNC: 0.26 NG/ML (ref 0–0.03)
URN SPEC COLLECT METH UR: ABNORMAL
UROBILINOGEN UR STRIP-ACNC: NEGATIVE EU/DL
VT: 400
WBC # BLD AUTO: 6.76 K/UL (ref 3.9–12.7)
WBC #/AREA URNS HPF: 0 /HPF (ref 0–5)

## 2023-04-11 PROCEDURE — 20000000 HC ICU ROOM

## 2023-04-11 PROCEDURE — 94668 MNPJ CHEST WALL SBSQ: CPT

## 2023-04-11 PROCEDURE — 87186 SC STD MICRODIL/AGAR DIL: CPT | Performed by: HOSPITALIST

## 2023-04-11 PROCEDURE — 87070 CULTURE OTHR SPECIMN AEROBIC: CPT | Performed by: HOSPITALIST

## 2023-04-11 PROCEDURE — 94761 N-INVAS EAR/PLS OXIMETRY MLT: CPT

## 2023-04-11 PROCEDURE — 63600175 PHARM REV CODE 636 W HCPCS: Performed by: HOSPITALIST

## 2023-04-11 PROCEDURE — 27000221 HC OXYGEN, UP TO 24 HOURS

## 2023-04-11 PROCEDURE — 87040 BLOOD CULTURE FOR BACTERIA: CPT | Mod: 59 | Performed by: HOSPITALIST

## 2023-04-11 PROCEDURE — 36600 WITHDRAWAL OF ARTERIAL BLOOD: CPT

## 2023-04-11 PROCEDURE — 99497 PR ADVNCD CARE PLAN 30 MIN: ICD-10-PCS | Mod: ,,, | Performed by: REGISTERED NURSE

## 2023-04-11 PROCEDURE — A4216 STERILE WATER/SALINE, 10 ML: HCPCS | Performed by: INTERNAL MEDICINE

## 2023-04-11 PROCEDURE — 94667 MNPJ CHEST WALL 1ST: CPT

## 2023-04-11 PROCEDURE — 82803 BLOOD GASES ANY COMBINATION: CPT

## 2023-04-11 PROCEDURE — 25000003 PHARM REV CODE 250: Performed by: HOSPITALIST

## 2023-04-11 PROCEDURE — 25000003 PHARM REV CODE 250: Performed by: INTERNAL MEDICINE

## 2023-04-11 PROCEDURE — 84484 ASSAY OF TROPONIN QUANT: CPT | Performed by: HOSPITALIST

## 2023-04-11 PROCEDURE — 25000242 PHARM REV CODE 250 ALT 637 W/ HCPCS: Performed by: HOSPITALIST

## 2023-04-11 PROCEDURE — 99900026 HC AIRWAY MAINTENANCE (STAT)

## 2023-04-11 PROCEDURE — 99900035 HC TECH TIME PER 15 MIN (STAT)

## 2023-04-11 PROCEDURE — 99291 PR CRITICAL CARE, E/M 30-74 MINUTES: ICD-10-PCS | Mod: ,,, | Performed by: INTERNAL MEDICINE

## 2023-04-11 PROCEDURE — 25000242 PHARM REV CODE 250 ALT 637 W/ HCPCS: Performed by: INTERNAL MEDICINE

## 2023-04-11 PROCEDURE — 85025 COMPLETE CBC W/AUTO DIFF WBC: CPT | Performed by: HOSPITALIST

## 2023-04-11 PROCEDURE — 31720 CLEARANCE OF AIRWAYS: CPT

## 2023-04-11 PROCEDURE — 99291 CRITICAL CARE FIRST HOUR: CPT | Mod: ,,, | Performed by: INTERNAL MEDICINE

## 2023-04-11 PROCEDURE — 87077 CULTURE AEROBIC IDENTIFY: CPT | Performed by: HOSPITALIST

## 2023-04-11 PROCEDURE — 25000003 PHARM REV CODE 250: Performed by: STUDENT IN AN ORGANIZED HEALTH CARE EDUCATION/TRAINING PROGRAM

## 2023-04-11 PROCEDURE — 94640 AIRWAY INHALATION TREATMENT: CPT

## 2023-04-11 PROCEDURE — 81000 URINALYSIS NONAUTO W/SCOPE: CPT | Performed by: HOSPITALIST

## 2023-04-11 PROCEDURE — 83605 ASSAY OF LACTIC ACID: CPT | Performed by: HOSPITALIST

## 2023-04-11 PROCEDURE — 99233 SBSQ HOSP IP/OBS HIGH 50: CPT | Mod: ,,, | Performed by: REGISTERED NURSE

## 2023-04-11 PROCEDURE — A4216 STERILE WATER/SALINE, 10 ML: HCPCS | Performed by: HOSPITALIST

## 2023-04-11 PROCEDURE — 94002 VENT MGMT INPAT INIT DAY: CPT

## 2023-04-11 PROCEDURE — 87205 SMEAR GRAM STAIN: CPT | Performed by: HOSPITALIST

## 2023-04-11 PROCEDURE — 94760 N-INVAS EAR/PLS OXIMETRY 1: CPT

## 2023-04-11 PROCEDURE — 99233 PR SUBSEQUENT HOSPITAL CARE,LEVL III: ICD-10-PCS | Mod: ,,, | Performed by: REGISTERED NURSE

## 2023-04-11 PROCEDURE — 99497 ADVNCD CARE PLAN 30 MIN: CPT | Mod: ,,, | Performed by: REGISTERED NURSE

## 2023-04-11 PROCEDURE — 80053 COMPREHEN METABOLIC PANEL: CPT | Performed by: HOSPITALIST

## 2023-04-11 RX ORDER — LOPERAMIDE HYDROCHLORIDE 2 MG/1
2 CAPSULE ORAL 4 TIMES DAILY PRN
Status: DISCONTINUED | OUTPATIENT
Start: 2023-04-11 | End: 2023-04-30 | Stop reason: HOSPADM

## 2023-04-11 RX ORDER — FENTANYL CITRATE-0.9 % NACL/PF 10 MCG/ML
0-200 PLASTIC BAG, INJECTION (ML) INTRAVENOUS CONTINUOUS
Status: DISCONTINUED | OUTPATIENT
Start: 2023-04-11 | End: 2023-04-11

## 2023-04-11 RX ORDER — FAMOTIDINE 10 MG/ML
20 INJECTION INTRAVENOUS 2 TIMES DAILY
Status: DISCONTINUED | OUTPATIENT
Start: 2023-04-11 | End: 2023-04-30 | Stop reason: HOSPADM

## 2023-04-11 RX ORDER — FENTANYL CITRATE 50 UG/ML
50 INJECTION, SOLUTION INTRAMUSCULAR; INTRAVENOUS
Status: DISCONTINUED | OUTPATIENT
Start: 2023-04-11 | End: 2023-04-30 | Stop reason: HOSPADM

## 2023-04-11 RX ORDER — CEFEPIME HYDROCHLORIDE 1 G/50ML
2 INJECTION, SOLUTION INTRAVENOUS
Status: DISCONTINUED | OUTPATIENT
Start: 2023-04-11 | End: 2023-04-18

## 2023-04-11 RX ORDER — CHLORHEXIDINE GLUCONATE ORAL RINSE 1.2 MG/ML
15 SOLUTION DENTAL 2 TIMES DAILY
Status: DISCONTINUED | OUTPATIENT
Start: 2023-04-11 | End: 2023-04-30 | Stop reason: HOSPADM

## 2023-04-11 RX ORDER — NOREPINEPHRINE BITARTRATE/D5W 4MG/250ML
0-3 PLASTIC BAG, INJECTION (ML) INTRAVENOUS CONTINUOUS
Status: DISCONTINUED | OUTPATIENT
Start: 2023-04-11 | End: 2023-04-13

## 2023-04-11 RX ORDER — HYDROXYZINE HYDROCHLORIDE 25 MG/1
25 TABLET, FILM COATED ORAL 3 TIMES DAILY PRN
Status: DISCONTINUED | OUTPATIENT
Start: 2023-04-11 | End: 2023-04-30 | Stop reason: HOSPADM

## 2023-04-11 RX ORDER — ACETYLCYSTEINE 100 MG/ML
4 SOLUTION ORAL; RESPIRATORY (INHALATION)
Status: DISCONTINUED | OUTPATIENT
Start: 2023-04-11 | End: 2023-04-15

## 2023-04-11 RX ORDER — FENTANYL CITRATE 50 UG/ML
50 INJECTION, SOLUTION INTRAMUSCULAR; INTRAVENOUS
Status: DISPENSED | OUTPATIENT
Start: 2023-04-11 | End: 2023-04-11

## 2023-04-11 RX ORDER — IPRATROPIUM BROMIDE AND ALBUTEROL SULFATE 2.5; .5 MG/3ML; MG/3ML
3 SOLUTION RESPIRATORY (INHALATION) EVERY 4 HOURS
Status: DISCONTINUED | OUTPATIENT
Start: 2023-04-11 | End: 2023-04-30 | Stop reason: HOSPADM

## 2023-04-11 RX ADMIN — Medication 10 ML: at 12:04

## 2023-04-11 RX ADMIN — ACETYLCYSTEINE 4 ML: 100 SOLUTION ORAL; RESPIRATORY (INHALATION) at 08:04

## 2023-04-11 RX ADMIN — IPRATROPIUM BROMIDE AND ALBUTEROL SULFATE 3 ML: 2.5; .5 SOLUTION RESPIRATORY (INHALATION) at 08:04

## 2023-04-11 RX ADMIN — VANCOMYCIN HYDROCHLORIDE 1750 MG: 500 INJECTION, POWDER, LYOPHILIZED, FOR SOLUTION INTRAVENOUS at 01:04

## 2023-04-11 RX ADMIN — MELATONIN TAB 3 MG 9 MG: 3 TAB at 09:04

## 2023-04-11 RX ADMIN — Medication 0.04 MCG/KG/MIN: at 09:04

## 2023-04-11 RX ADMIN — METOPROLOL TARTRATE 25 MG: 25 TABLET, FILM COATED ORAL at 10:04

## 2023-04-11 RX ADMIN — SULFAMETHOXAZOLE AND TRIMETHOPRIM 1 TABLET: 800; 160 TABLET ORAL at 10:04

## 2023-04-11 RX ADMIN — HYDROXYZINE HYDROCHLORIDE 25 MG: 25 TABLET ORAL at 09:04

## 2023-04-11 RX ADMIN — ATORVASTATIN CALCIUM 80 MG: 40 TABLET, FILM COATED ORAL at 10:04

## 2023-04-11 RX ADMIN — FERROUS SULFATE TAB 325 MG (65 MG ELEMENTAL FE) 1 EACH: 325 (65 FE) TAB at 10:04

## 2023-04-11 RX ADMIN — SODIUM CHLORIDE 1000 ML: 9 INJECTION, SOLUTION INTRAVENOUS at 11:04

## 2023-04-11 RX ADMIN — Medication 10 ML: at 06:04

## 2023-04-11 RX ADMIN — CLOPIDOGREL BISULFATE 75 MG: 75 TABLET ORAL at 10:04

## 2023-04-11 RX ADMIN — GLYCOPYRROLATE 1 MG: 1 TABLET ORAL at 10:04

## 2023-04-11 RX ADMIN — IPRATROPIUM BROMIDE AND ALBUTEROL SULFATE 3 ML: 2.5; .5 SOLUTION RESPIRATORY (INHALATION) at 03:04

## 2023-04-11 RX ADMIN — SULFAMETHOXAZOLE AND TRIMETHOPRIM 1 TABLET: 800; 160 TABLET ORAL at 09:04

## 2023-04-11 RX ADMIN — Medication 0.05 MCG/KG/MIN: at 12:04

## 2023-04-11 RX ADMIN — CEFEPIME HYDROCHLORIDE 2 G: 2 INJECTION, SOLUTION INTRAVENOUS at 09:04

## 2023-04-11 RX ADMIN — FENTANYL CITRATE 50 MCG: 50 INJECTION, SOLUTION INTRAMUSCULAR; INTRAVENOUS at 12:04

## 2023-04-11 RX ADMIN — ACETYLCYSTEINE 4 ML: 100 SOLUTION ORAL; RESPIRATORY (INHALATION) at 03:04

## 2023-04-11 RX ADMIN — FENTANYL CITRATE 50 MCG: 50 INJECTION, SOLUTION INTRAMUSCULAR; INTRAVENOUS at 10:04

## 2023-04-11 RX ADMIN — FAMOTIDINE 20 MG: 10 INJECTION, SOLUTION INTRAVENOUS at 09:04

## 2023-04-11 RX ADMIN — FAMOTIDINE 20 MG: 10 INJECTION, SOLUTION INTRAVENOUS at 10:04

## 2023-04-11 RX ADMIN — GLYCOPYRROLATE 1 MG: 1 TABLET ORAL at 03:04

## 2023-04-11 RX ADMIN — CEFEPIME HYDROCHLORIDE 2 G: 2 INJECTION, SOLUTION INTRAVENOUS at 12:04

## 2023-04-11 RX ADMIN — INSULIN ASPART 2 UNITS: 100 INJECTION, SOLUTION INTRAVENOUS; SUBCUTANEOUS at 05:04

## 2023-04-11 RX ADMIN — CHLORHEXIDINE GLUCONATE 0.12% ORAL RINSE 15 ML: 1.2 LIQUID ORAL at 09:04

## 2023-04-11 RX ADMIN — ASPIRIN 81 MG CHEWABLE TABLET 81 MG: 81 TABLET CHEWABLE at 10:04

## 2023-04-11 RX ADMIN — LIDOCAINE 5% 2 PATCH: 700 PATCH TOPICAL at 12:04

## 2023-04-11 RX ADMIN — Medication 10 ML: at 09:04

## 2023-04-11 RX ADMIN — IPRATROPIUM BROMIDE AND ALBUTEROL SULFATE 3 ML: 2.5; .5 SOLUTION RESPIRATORY (INHALATION) at 11:04

## 2023-04-11 RX ADMIN — GLYCOPYRROLATE 1 MG: 1 TABLET ORAL at 09:04

## 2023-04-11 RX ADMIN — CHLORHEXIDINE GLUCONATE 0.12% ORAL RINSE 15 ML: 1.2 LIQUID ORAL at 10:04

## 2023-04-11 NOTE — ASSESSMENT & PLAN NOTE
"Re-Assessment for 60 Day Progress Note  *    Patient: Tavia Gómez   : 1981  Diagnosis/ICD-10 Code:  Status post ORIF of fracture of ankle [Z98.890, Z87.81]  Referring practitioner: Chacho Chowdhury MD  Date of Initial Visit: Type: THERAPY  Noted: 3/15/2022  Today's Date: 2022  Patient seen for 15 sessions      Subjective:   Tavia Gómez reports: \"it's doing ok\"  Subjective Questionnaire: LEFS:   Clinical Progress: improved  Home Program Compliance: Yes  Treatment has included: therapeutic exercise, neuromuscular re-education, manual therapy, therapeutic activity, gait training and cryotherapy    Objective   Active Range of Motion   Left Ankle/Foot   Dorsiflexion (ke): 10 degrees  Plantar flexion: 52 degrees   Inversion: 42 degrees   Eversion: 24 degrees      Passive Range of Motion   Left Ankle/Foot    WNL in all directions, no pain     Strength/Myotome Testing      Left Ankle/Foot   Dorsiflexion: 4  Plantar flexion: 4  Inversion: 4  Eversion: 4+    Additional Strength Details  Able to complete B heel raise with full ROM and single leg heel raise with UE support      Swelling   Left Ankle/Foot   Metatarsal heads: 21 cm  Figure 8: 50 cm  Malleoli: 23.5 cm     Right Ankle/Foot   Malleoli: 22 cm     Ambulation   Weight-Bearing Status   Weight-Bearing Status (Left): weight-bearing as tolerated      Observational Gait   Gait: WNL   Left foot contact pattern: heel strike  Base of support: increased    Additional Observational Gait Details  With tennis shoes     Functional Assessment  SLB: LLE >30 sec  Tandem: >30 sec    Assessment & Plan     Assessment  Impairments: abnormal or restricted ROM, activity intolerance, impaired balance, impaired physical strength and pain with function  Functional Limitations: uncomfortable because of pain, standing and unable to perform repetitive tasks  Assessment details: Tavia Gómez is a 41 y.o. year-old female referred to physical therapy for s/p L ankle ORIF (DOS " At home is on 5L O2 via trach and O2 sats typically in high 80s low 90s. Worsening respiration this admission due to aspiration of blood and likely food aspiration. Required ventilator from 3/21 to 3/23, subsequently weaned. Treated empirically for pneumonia. Respiratory cultures show Stenotrophomonas which was not treated  - started bactrim to treat Stenotrophomonas  - worsening hypoxia and hypercapnia on 4/11. This could be related to trazodone use (attempted 4/10 PM for sleep)   - to ICU. Place on vent. Pulm consult. Repeat trach aspirate. Increase nebs to Q4h     21). Pt has progress gait to WNL in tennis shoes and normal daily shoes; swelling controlled despite recent increase in activity. Pt with improving L ankle AROM and PROM to WNL; still lack full strength and stability.  Improved static SL balance however impaired stability with more dynamic exercises on LLE. Initiated jogging just for 30 foot distances in clinic today with cues/education on mechanics.  Patient is appropriate for skilled physical therapy in order to reduce pain and increase ease with daily mobility; decrease frequency to 1x per week.  Pt educated on desensitization to decrease hypersenstivity, ice/elevation as needed, shoe wear, walking/gait mechanics.  Prognosis: good    Goals  Plan Goals: ST weeks  1. Pt will have L ankle AROM WNL and pain-free in NWB position-MET  2. Pt will demonstrate gait WNL and be able to consistently walk for ~20 min to navigate the grocery store without boot-MET  3. Pt will be able to tolerate SLS on LLE for at least 10 sec without UE support to improve L ankle stability-MET  4. Pt will be able to complete at least 20 B heel/toe raises standing to improve strength-MET  5. Pt will decreased L malleoli swelling to 22 cm or less-PROGRESSING    LT weeks  1. Pt will demonstrates L ankle single leg heel raise without pain-PROGRESSING  2. Pt will demonstrate L ankle strength 5/5 in DF, PF, INV, and EV-PROGRESSING  3. Pt will be able to initiate plyometric training to be able to return to regular workouts and hiking-MET but still painful  4.Pt will be able to tolerate SLS on LLE for at least 30 sec without UE support to improve L ankle stability-MET  5. LEFS>60/80-MET    Plan  Therapy options: will be seen for skilled therapy services  Planned modality interventions: cryotherapy, dry needling, electrical stimulation/Russian stimulation, iontophoresis, TENS, thermotherapy (hydrocollator packs), traction and ultrasound  Planned therapy interventions: abdominal trunk  stabilization, ADL retraining, balance/weight-bearing training, body mechanics training, flexibility, functional ROM exercises, gait training, home exercise program, IADL retraining, joint mobilization, transfer training, therapeutic activities, stretching, strengthening, spinal/joint mobilization, soft tissue mobilization, postural training, orthotic fitting/training, neuromuscular re-education and manual therapy  Frequency: 1x week  Treatment plan discussed with: patient  Plan details: 1x for 4 weeks        Visit Diagnoses:    ICD-10-CM ICD-9-CM   1. Status post ORIF of fracture of ankle  Z98.890 V45.89    Z87.81 V15.51   2. Decreased range of motion of left ankle  M25.672 719.57   3. Gait disturbance  R26.9 781.2   4. Left leg weakness  R29.898 729.89         PT Signature: PETER Tavares license: [id number]      Timed:  Manual Therapy:    8     mins  49051;  Therapeutic Exercise:    15     mins  46453;     Neuromuscular Contreras:    16    mins  69716;    Therapeutic Activity:     10     mins  06421;     Gait Training:      -     mins  36236;     Ultrasound:     -     mins  10157;    Electrical Stimulation:    -     mins  22418 ( );    Untimed:  Electrical Stimulation:    -     mins  51135 ( );  Mechanical Traction:    -     mins  92411; \  Dry needling:      -     mins  20560/20561    Timed Treatment:   46   mins   Total Treatment:     60   mins

## 2023-04-11 NOTE — ASSESSMENT & PLAN NOTE
Anxiety waxes and wanes with clinical status  He also has delirium  Stopped hydroxyzine as this could be contributing to delirium

## 2023-04-11 NOTE — PLAN OF CARE
Problem: Adult Inpatient Plan of Care  Goal: Plan of Care Review  Outcome: Ongoing, Progressing  Goal: Patient-Specific Goal (Individualized)  Outcome: Ongoing, Progressing  Goal: Absence of Hospital-Acquired Illness or Injury  Outcome: Ongoing, Progressing  Goal: Optimal Comfort and Wellbeing  Outcome: Ongoing, Progressing  Goal: Readiness for Transition of Care  Outcome: Ongoing, Progressing     Problem: Fall Injury Risk  Goal: Absence of Fall and Fall-Related Injury  Outcome: Ongoing, Progressing       Patient remained free from falls/injury throughout shift.  Bed locked in lowest position.  Side rails up x2.  Call bell within reach.  Purposeful rounding maintained throughout shift.

## 2023-04-11 NOTE — ASSESSMENT & PLAN NOTE
4/11/2023  - interval chart reviewed in detail; discussed pt's return to ICU during ICU MDT rounds   - call placed to pt's wife, Bridgett, to ensure she was updated on transfer to ICU; allowed her to share her account of information provided about this and care since leaving ICU   - wife shares MDT's concerns regarding AMS, and respiratory status; concerned that pt removed his trach a few nights ago and then length of time the trach was potentially out; she is also concerned pt 's continued attempts to pull at PEG and other lines; advocating that she is ok with the use of soft restraints if needed for pt safety (discussed Ms. Urias's concerns with both Dr. Nava (primary) and Dr. Meza (PCCM)  - plan for continued GOC/ACP conversations with pt's wife while pt lacks capacity   - emotional support provided to wife; allowed time for questions/concerns, all addressed   - pt remained unresponsive throughout the day during attempted visits; on ventilator since transfer to ICU  - wife called in afternoon; brief medical update; she shared she has been ill today (possible GI bug) and will no visit today to be safe for pt; available by phone however if pt status changes, can come if needed   - ongoing communication with MDT     Please see prior palliative notes/consult for previous GOC/ACP discussions.

## 2023-04-11 NOTE — ASSESSMENT & PLAN NOTE
Tracheostomy in place since surgical resection for head/neck cancer in January 2022.  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    No signs of ongoing bleeding at the moment

## 2023-04-11 NOTE — ASSESSMENT & PLAN NOTE
- Echo showed EF 60%, inferobasal hypokinesis, indeterminate diastolic function  - nitro drip weaned; pt denying any continued angina  - cardiac cath procedure on 3/23 and 3/27 with stent placements ; per cardiology poor CABG candidate   - continued concern for pt's tolerance/bleeding risk, but anticoagulation necessary give recent stent placements; cardiology following   - noted; management per hospital primary, and Cardiology

## 2023-04-11 NOTE — PROGRESS NOTES
[facility] Medicine  Progress Note    Patient Name: Fareed Richard Jr.  MRN: [id number]  Patient Class: IP- Inpatient   Admission Date: 3/18/2023  Length of Stay: 23 days  Attending Physician: Caprice Nava MD  Primary Care Provider: Kodi Tubbs MD        Subjective:     Principal Problem:Acute on chronic respiratory failure with hypoxia and hypercapnia        HPI:  Fareed Richard Jr. 73 y.o. 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  Per his wife 4 days ago he began to have pink tinged sputum via his trach.  He was seen by his ENT 2 days ago with a scope exam and a trach exchange without evidence of bleeding.  This morning at 3:00 a.m. he developed bright red blood via trach which resolved without intervention.  It is since not recurred.  He takes a daily aspirin.  He receives all medications via G-tube.  His tube feeding regimen consists of 6 cans of Isosource daily with 120 cc free water boluses.  He denies fever chest pain shortness of breath nausea vomiting abdominal pain leg swelling syncope dizziness dysuria melena hematuria hematemesis.    In the ED, hemoglobin 7.6 INR 1.0 BUN 50 creatinine 0.7  troponin negative BRCA negative O positive type and screen chest x-ray without detrimental change hypotensive to 85/54.      Overview/Hospital Course:  72 yo M w squamous CA of tongue s/p glossectomy and reconstructive flap with trach, CAD, chronic hypoxic respiratory failure (on 5L at home) and with peg presented for evaluation of hemoptysis 2 days after tracheostomy change in clinic. Transferred to ICU 3/19 when markedly hypotensive.  Unclear if due to hemorrhage, cardiogenic or septic shock.  Did require PRBC and TXA nebs but didn't seem like sufficient bleeding to cause shock.  Bleeding stopped and ENT following.  CTA shows either significant mucus  plugging or bibasilar pneumonia - pulmonology favored pneumonia.  Trach aspirate with Stenotrophomonas, Achromobacter, and Candida. Also urine culture growing Enterococcus.  He is on broad spectrum antibiotics including bactrim. Developed NSTEMI. Cardiology consulted. [facility] 3/23 which showed distal LM 30%, mild LAD 90% bifurcation lesion with D1, Cx mid 80%, RCA moderate diffuse disease with long 70% and some left to right collateral. All vessels heavily calcified. Not a candidate for CABG but plan for staged PCI. Continued on heparin drip, asa/plavix. Vasopressors weaned. [facility] 3/27 PCI to LAD and LCx. Post operatively he was hemodynamically unstable and anemic. Stat CTA showed RP hematoma near L kidney without acute bleeding. Pt transfused supportively. He improved over the next few days. Stepped down to floor. Remains on trach collar, higher than home O2 requirements. He has delirium as well. On 4/11/23, he was much more lethargic and was transferred to ICU for worsening acute on chronic hypoxic/hypercapnic respiratory failure.       Interval History: Lethargic, does not follow commands this morning. More labored breathing.     Review of Systems   Unable to perform ROS: Mental status change   Respiratory:  Positive for shortness of breath.    Psychiatric/Behavioral:  Positive for confusion.    Objective:     Vital Signs (Most Recent):  Temp: 97.6 °F (36.4 °C) (04/11/23 0723)  Pulse: 106 (04/11/23 0814)  Resp: (!) 22 (04/11/23 0814)  BP: 132/72 (04/11/23 0723)  SpO2: (!) 92 % (04/11/23 0814)   Vital Signs (24h Range):  Temp:  [96.6 °F (35.9 °C)-97.7 °F (36.5 °C)] 97.6 °F (36.4 °C)  Pulse:  [] 106  Resp:  [19-26] 22  SpO2:  [90 %-95 %] 92 %  BP: (132-160)/(66-92) 132/72     Weight: 66.6 kg (146 lb 13.2 oz)  Body mass index is 22.32 kg/m².    Intake/Output Summary (Last 24 hours) at 4/11/2023 0919  Last data filed at 4/11/2023 0621  Gross per 24 hour   Intake 780 ml   Output 1000 ml   Net -220 ml        Physical  Exam  Vitals and nursing note reviewed.   Constitutional:       General: He is in acute distress.      Appearance: He is ill-appearing and toxic-appearing.   HENT:      Head: Normocephalic and atraumatic.      Comments: Temporal wasting     Nose: Nose normal.      Mouth/Throat:      Comments: Edentulous. Dried blood in mouth  Neck:      Comments: Trach in place, yellow secretions  Cardiovascular:      Rate and Rhythm: Regular rhythm. Tachycardia present.   Pulmonary:      Effort: No respiratory distress.      Breath sounds: Rhonchi present. No wheezing or rales.      Comments: Coarse bilaterally. On trach collar 16 L  Abdominal:      General: Bowel sounds are normal.      Palpations: Abdomen is soft.      Comments: PEG in place   Musculoskeletal:      Right lower leg: No edema.      Left lower leg: No edema.      Comments: Muscular wasting   Skin:     General: Skin is warm and dry.   Neurological:      Mental Status: He is disoriented.      Comments: Moving arms and legs, grabbing for things. Does not answer questions or follow commands       Significant Labs: All pertinent labs within the past 24 hours have been reviewed.    Significant Imaging: I have reviewed all pertinent imaging results/findings within the past 24 hours.      Assessment/Plan:      * Acute on chronic respiratory failure with hypoxia and hypercapnia  At home is on 5L O2 via trach and O2 sats typically in high 80s low 90s. Worsening respiration this admission due to aspiration of blood and likely food aspiration. Required ventilator from 3/21 to 3/23, subsequently weaned. Treated empirically for pneumonia. Respiratory cultures show Stenotrophomonas which was not treated  - started bactrim to treat Stenotrophomonas  - worsening hypoxia and hypercapnia on 4/11. This could be related to trazodone use (attempted 4/10 PM for sleep)   - to ICU. Place on vent. Pulm consult. Repeat trach aspirate. Increase nebs to Q4h      Retroperitoneal hematoma  RP  "hematoma discovered after LHC. See "acute blood loss anemia"      NSTEMI (non-ST elevated myocardial infarction)  See "coronary artery disease"      Anxiety  Anxiety waxes and wanes with clinical status  He also has delirium  Stopped hydroxyzine as this could be contributing to delirium     Hemoptysis  -See acute blood loss anemia    PEG (percutaneous endoscopic gastrostomy) status  Continue medications via PEG. Does not take oral intake.   -On isosource 1.5 6 days daily with 120cc free water flushes via wife  -Will continue home tube feedings, with nutrition consulted    Tracheostomy present  See respiratory failure     ACP (advance care planning)  Advance Care Planning     Date: 04/10/2023  Palliative consulted, reviewed documentation. Full code. Needs home palliative follow up upon discharge. Time spent reviewing on 4/10/23= 5 minutes          Severe protein-calorie malnutrition  Nutrition consulted. Most recent weight and BMI monitored-   Wt Readings from Last 3 Encounters:   04/07/23 0730 66.6 kg (146 lb 13.2 oz)   04/06/23 0715 65.7 kg (144 lb 13.5 oz)   04/05/23 0715 97.8 kg (215 lb 9.8 oz)   04/04/23 0715 97.3 kg (214 lb 8.1 oz)   04/03/23 0715 67 kg (147 lb 11.3 oz)   03/27/23 1324 68 kg (150 lb)   03/20/23 1130 68 kg (150 lb)   03/18/23 1747 68 kg (150 lb)   03/18/23 1028 68 kg (150 lb)   03/16/23 1600 65.8 kg (145 lb)   01/12/23 1414 65.3 kg (143 lb 15.4 oz)     Body mass index is 22.32 kg/m².     Recommendations: Recommendation/Intervention: 1. Continue with TF recs; Monitor diet advancements. 2. Advance diet as tolerated and appropriate. 3. Monitor weight changes; check weekly weights.  Goals: 1. Diet advancement by RD follow up.    Patient has been screened and assessed by RD. RD will follow patient.      Secondary malignant neoplasm of cervical lymph node  Noted      Acute blood loss anemia  Pt presented with hemoptysis. Scopes prior to admit and by ENT during admit not showing clear source. Bleeding " improved. Pt underwent LHC for NSTEMI. After that procedure he was found to have a large retroperitoneal hematoma. Since starting DAPT he has also had recurrent hemoptysis as well as melanic stools. Trach cuff reinflated 4/2 with improvement in bleeding.  - trend CBC, transfuse prn  - Hgb now stable  - continue asa, plavix     COPD exacerbation  This seems resolved; however, did develop worsening hypoxic/hypercapnic respiratory failure  - increase nebs      Other hyperlipidemia  -Continue home statin    Primary hypertension  Previously hypotensive, now normotensive  - continue metoprolol    Coronary artery disease involving native coronary artery of native heart with unstable angina pectoris  Troponin elevated when pt was upgraded to the ICU. At that time it was thought to be due to demand, however he had persistent chest pain concerning for ACS. Was started on heparin gtt and tolerated. Subsequently went to [facility] which showed diffuse disease. Pt not a candidate for CABG. He subsequently underwent PCI of LAD and LCx.  - Continue asa, plavix, statin, metoprolol      Squamous cell cancer of tongue  Dx 2021, now s/p total glossectomy, bilateral neck dissection, bilateral cervical facial flaps and anterolateral thigh flap reconstruction of glossectomy defect 1/4/22. Completed adjuvant chemoradiation. Had trach changed by ENT 2 days prior to admit and scope at that time showed no signs of bleeding despite hemoptysis present.       Carotid stenosis  Continues on asa, plavix, statin       Peripheral vascular disease  Continues on asa, plavix, statin         VTE Risk Mitigation (From admission, onward)           Ordered     IP VTE LOW RISK PATIENT  Once         04/11/23 0909     Place sequential compression device  Until discontinued         03/18/23 1620     Place NIKITA hose  Until discontinued         03/18/23 1620                    Discharge Planning   NISA: 4/14/2023     Code Status: Full Code   Is the patient medically  ready for discharge?:     Reason for patient still in hospital (select all that apply): Patient unstable  Discharge Plan A: Rehab   Discharge Delays: None known at this time    Updated wife Mrs Richard by phone and notified her of ICU transfer.     Critical care time spent on the evaluation and treatment of severe organ dysfunction, review of pertinent labs and imaging studies, discussions with consulting providers and discussions with patient/family: 60 minutes.    11:49 AM  Hypoxia improving on SpO2 after starting vent. CO2 not yet improved. Worsening hypotension. Bolus NS and levophed ordered if needed. Difficult to say if this is septic shock (has had PNA and UTI) vs cardiogenic shock (has had NSTEMI) vs hemorrhagic shock (has had hemoptysis and retroperitoneal hematoma). Plan repeat blood/trach/urine cultures. Broaden to vanc/cefepime/bactrim-- continue bactrim for Stenotrophomonas. Check EKG and trop. CBC stable this AM- recheck in PM.           Caprice Nava MD  Department of Hospital Medicine   [facility] - Intensive Care

## 2023-04-11 NOTE — PLAN OF CARE
04/11/23 1418   Discharge Reassessment   Assessment Type Discharge Planning Reassessment   Did the patient's condition or plan change since previous assessment? Yes       Patient arrived from:  home  Discharge plan at this time:  1) rehab  2) SNF (If accepting trach patients) vs HH  Barriers to DC: Done  Patient is now in the ICU. CM will continue to follow and finalize plans.    Per careport:  [facility] reviewing.  [facility] received.

## 2023-04-11 NOTE — ASSESSMENT & PLAN NOTE
- was initially admitted with significant bleeding to trach site, an additional episode during admission; pt self-removed trach overnight/early AM 4/9-4/10 requiring RT to re-insert trach; length of time trach was removed unclear but no respiratory distress/need for resuscitation per documentation and verbal reports from MDT   - pt has frustrations with limited ability to verbalize/communicate; can become frustrated/anxious/dyspneic at times related to this; writes in a notebook often to support communication   - noted; management per hospital primary

## 2023-04-11 NOTE — ASSESSMENT & PLAN NOTE
Troponin elevated when pt was upgraded to the ICU. At that time it was thought to be due to demand, however he had persistent chest pain concerning for ACS. Was started on heparin gtt and tolerated. Subsequently went to [facility] which showed diffuse disease. Pt not a candidate for CABG. He subsequently underwent PCI of LAD and LCx.  - Continue asa, plavix, statin, metoprolol

## 2023-04-11 NOTE — ASSESSMENT & PLAN NOTE
This seems resolved; however, did develop worsening hypoxic/hypercapnic respiratory failure  - increase nebs

## 2023-04-11 NOTE — ASSESSMENT & PLAN NOTE
Vent Mode: PRVC  Oxygen Concentration (%):  [] 60  Resp Rate Total:  [18 br/min-24 br/min] 24 br/min  Vt Set:  [400 mL] 400 mL  PEEP/CPAP:  [5 cmH20] 5 cmH20  Mean Airway Pressure:  [8.8 ylR67-08.3 cmH20] 10.2 cmH20  Placed back on the ventilator for hypercapnea  -follow up VBG for improvement  - hypoxia and hypercapnea - weaning O2 for goal sat of 88% and above  - COPD treatment as above  - sputum culture- no clear signs of infection on CXR  - consider diuresis but now hypotensive so more concerning for sepsis vs possible cardiac event.   - can also consider PE- has been on DAPT and no signs of significant bleeding at this time can consider adding back dvt ppx

## 2023-04-11 NOTE — PROGRESS NOTES
[facility] - Intensive Care  Palliative Medicine  Progress Note    Patient Name: Fareed Richard Jr.  MRN: [id number]  Admission Date: 3/18/2023  Hospital Length of Stay: 23 days  Code Status: Full Code   Attending Provider: Caprice Nava MD  Consulting Provider: Trupti Beck NP  Primary Care Physician: Kodi Tubbs MD  Principal Problem:Acute on chronic respiratory failure with hypoxia and hypercapnia    Patient information was obtained from patient, spouse/SO and primary team.      Assessment/Plan:   Advance Care Planning   Palliative Care  ACP (advance care planning)  4/11/2023  - interval chart reviewed in detail; discussed pt's return to ICU during ICU MDT rounds   - call placed to pt's wife, Bridgett, to ensure she was updated on transfer to ICU; allowed her to share her account of information provided about this and care since leaving ICU   - wife shares MDT's concerns regarding AMS, and respiratory status; concerned that pt removed his trach a few nights ago and then length of time the trach was potentially out; she is also concerned pt 's continued attempts to pull at PEG and other lines; advocating that she is ok with the use of soft restraints if needed for pt safety (discussed Ms. Urias's concerns with both Dr. Nava (primary) and Dr. Meza (PCCM)  - plan for continued GOC/ACP conversations with pt's wife while pt lacks capacity   - emotional support provided to wife; allowed time for questions/concerns, all addressed   - pt remained unresponsive throughout the day during attempted visits; on ventilator since transfer to ICU  - wife called in afternoon; brief medical update; she shared she has been ill today (possible GI bug) and will no visit today to be safe for pt; available by phone however if pt status changes, can come if needed   - ongoing communication with MDT     Please see prior palliative notes/consult for previous GOC/ACP discussions.     ENT  Tracheostomy present  - was initially  admitted with significant bleeding to trach site, an additional episode during admission; pt self-removed trach overnight/early AM 4/9-4/10 requiring RT to re-insert trach; length of time trach was removed unclear but no respiratory distress/need for resuscitation per documentation and verbal reports from MDT   - pt has frustrations with limited ability to verbalize/communicate; can become frustrated/anxious/dyspneic at times related to this; writes in a notebook often to support communication   - noted; management per hospital primary    Pulmonary  * Acute on chronic respiratory failure with hypoxia and hypercapnia  - trach; now returned to ICU requiring vent; hypercapnic   - respiratory status greatly affected during periods of anxiety during admit   - noted; management per hospital primary and [facility]     Cardiac/Vascular  NSTEMI (non-ST elevated myocardial infarction)  - Echo showed EF 60%, inferobasal hypokinesis, indeterminate diastolic function  - nitro drip weaned; pt denying any continued angina  - cardiac cath procedure on 3/23 and 3/27 with stent placements ; per cardiology poor CABG candidate   - continued concern for pt's tolerance/bleeding risk, but anticoagulation necessary give recent stent placements; cardiology following   - noted; management per hospital primary, and Cardiology     GI  PEG (percutaneous endoscopic gastrostomy) status  - PEG for feeding and meds; tube feeds typically well tolerated and no difficulty with home management by wife   - resumed continuous feeds on 4/4 per pt's request     - wife concerned of condition of PEG tube; original PEG tube in place; wife requesting GI consult to discuss while inpatient incase exchange is needed   - noted; management per hospital primary     I will follow-up with patient. Please contact us if you have any additional questions.    Total visit time: 60 minutes    > 50% of  35  min visit spent in chart review, face to face discussion of symptom  "assessment, coordination of care with other specialists, documentation, and discharge planning.    25 min ACP time spent: goals of care, emotional support, formulating and communicating prognosis, exploring burden/ benefit of various approaches of treatment.     Trupti Beck NP  Palliative Medicine  [facility] [place] - Intensive Care    Subjective:     Chief Complaint:   Chief Complaint   Patient presents with    Hemoptysis     Ems called to 72yo male that wife noticed was having blood y sputum in his trach on Wednesday. Went thursdsay to have a trach change and the dr was unale to scope his mouth above the trach due to him gagging but did change the trach. Continued to have the pink sputum until 0300 today and it had bright red blood from trach. Denied any pain or SOB       HPI:   From H&P: " Fareed Richard . 73 y.o. 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  Per his wife 4 days ago he began to have pink tinged sputum via his trach.  He was seen by his ENT 2 days ago with a scope exam and a trach exchange without evidence of bleeding.  This morning at 3:00 a.m. he developed bright red blood via trach which resolved without intervention.  It is since not recurred.  He takes a daily aspirin.  He receives all medications via G-tube.  His tube feeding regimen consists of 6 cans of Isosource daily with 120 cc free water boluses.  He denies fever chest pain shortness of breath nausea vomiting abdominal pain leg swelling syncope dizziness dysuria melena hematuria hematemesis.     In the ED, hemoglobin 7.6 INR 1.0 BUN 50 creatinine 0.7  troponin negative BRCA negative O positive type and screen chest x-ray without detrimental change hypotensive to 85/54."     Palliative medicine consulted for goals of care and advance care planning; Met with pt at bedside; for details of visit, see advance " care planning section of plan.         Hospital Course:  No notes on file    Medications:  Continuous Infusions:   NORepinephrine bitartrate-D5W 0.05 mcg/kg/min (23 1201)     Scheduled Meds:   albuterol-ipratropium  3 mL Nebulization Q4H    aspirin  81 mg Oral Daily    atorvastatin  80 mg Per G Tube Daily    ceFEPime (MAXIPIME) IVPB  2 g Intravenous Q8H    chlorhexidine  15 mL Mouth/Throat BID    clopidogreL  75 mg Oral Daily    famotidine (PF)  20 mg Intravenous BID    ferrous sulfate  1 tablet Per G Tube Daily    glycopyrrolate  1 mg Per G Tube TID    LIDOcaine  2 patch Transdermal Q24H    melatonin  9 mg Per G Tube Nightly    metoprolol tartrate  25 mg Per G Tube BID    sodium chloride 0.9%  10 mL Intravenous Q6H    sulfamethoxazole-trimethoprim 800-160mg  1 tablet Per G Tube BID    vancomycin (VANCOCIN) IVPB  1,750 mg Intravenous Once     PRN Meds:acetaminophen, albuterol-ipratropium, atropine, bisacodyL, [] fentaNYL **FOLLOWED BY** fentaNYL, guaiFENesin 100 mg/5 ml, insulin aspart U-100, loperamide, naloxone, nitroGLYCERIN, ondansetron, oxyCODONE, phenyleph-min oil-petrolatum, Flushing PICC Protocol **AND** sodium chloride 0.9% **AND** sodium chloride 0.9%, Pharmacy to dose Vancomycin consult **AND** vancomycin - pharmacy to dose    Objective:     Vital Signs (Most Recent):  Temp: 98.3 °F (36.8 °C) (23 1000)  Pulse: 83 (23 1134)  Resp: 20 (23 1224)  BP: (!) 79/50 (23 1100)  SpO2: 98 % (23 1134)   Vital Signs (24h Range):  Temp:  [96.6 °F (35.9 °C)-98.3 °F (36.8 °C)] 98.3 °F (36.8 °C)  Pulse:  [] 83  Resp:  [18.1-26] 20  SpO2:  [90 %-100 %] 98 %  BP: ()/(50-92) 79/50     Weight: 66.6 kg (146 lb 13.2 oz)  Body mass index is 22.32 kg/m².    Physical Exam  Vitals and nursing note reviewed.   Constitutional:       General: He is in acute distress.      Comments: Lying in bed with eyes closed; did not respond to name   HENT:      Head:       Comments: Temporal wasting  Cardiovascular:      Rate and Rhythm: Normal rate and regular rhythm.   Pulmonary:      Effort: No respiratory distress.      Comments: Ventilator to trach   Abdominal:      Comments: PEG    Musculoskeletal:      Right lower leg: No edema.      Left lower leg: No edema.      Comments: Muscular wasting    Neurological:      Mental Status: He is disoriented.      Comments: Not responding to name or commands; twitching/non-purposeful movements of upper extremities (sometimes correlated to noises in rooms)      Advance Care Planning   Advance Directives:   Living Will: No    [place]POST: No    Do Not Resuscitate Status: No    Medical Power of : No    Goals of Care: What is most important right now is to focus on spending time at home, remaining as independent as possible, symptom/pain control, curative/life-prolongation (regardless of treatment burdens). Accordingly, we have decided that the best plan to meet the patient's goals includes continuing with treatment.     Significant Labs: All pertinent labs within the past 24 hours have been reviewed.  CBC:   Recent Labs   Lab 04/11/23  0843   WBC 6.76   HGB 9.4*   HCT 31.2*   *        BMP:  Recent Labs   Lab 04/11/23  0843   *      K 4.2      CO2 35*   BUN 15   CREATININE 0.8   CALCIUM 8.7     LFT:  Lab Results   Component Value Date    AST 29 04/11/2023    ALKPHOS 112 04/11/2023    BILITOT 0.5 04/11/2023     Albumin:   Albumin   Date Value Ref Range Status   04/11/2023 2.3 (L) 3.5 - 5.2 g/dL Final     Protein:   Total Protein   Date Value Ref Range Status   04/11/2023 5.7 (L) 6.0 - 8.4 g/dL Final     Lactic acid:   Lab Results   Component Value Date    LACTATE 0.5 04/11/2023    LACTATE 1.8 03/19/2023     Significant Imaging: I have reviewed all pertinent imaging results/findings within the past 24 hours.

## 2023-04-11 NOTE — CARE UPDATE
Pt transferred to ICU , trach midline and intact, placed on vent with settings as documented, alarms on and functioning, ambu bag @ bedside. No current signs of any distress noted.

## 2023-04-11 NOTE — SUBJECTIVE & OBJECTIVE
Interval History: Mr. Richard became more confused and lethargic on the floor and was found to be acidotic and hypercapnic. He was transferred back to the ICU and placed on the ventilator.       Objective:     Vital Signs (Most Recent):  Temp: 98.3 °F (36.8 °C) (04/11/23 1000)  Pulse: 83 (04/11/23 1134)  Resp: 20 (04/11/23 1224)  BP: (!) 79/50 (04/11/23 1100)  SpO2: 98 % (04/11/23 1134)   Vital Signs (24h Range):  Temp:  [96.6 °F (35.9 °C)-98.3 °F (36.8 °C)] 98.3 °F (36.8 °C)  Pulse:  [] 83  Resp:  [18.1-26] 20  SpO2:  [90 %-100 %] 98 %  BP: ()/(50-92) 79/50     Weight: 66.6 kg (146 lb 13.2 oz)  Body mass index is 22.32 kg/m².      Intake/Output Summary (Last 24 hours) at 4/11/2023 1251  Last data filed at 4/11/2023 1000  Gross per 24 hour   Intake 820 ml   Output 800 ml   Net 20 ml       Physical Exam  Vitals reviewed.   Constitutional:       Appearance: He is ill-appearing (chronically ill) and toxic-appearing.   HENT:      Head: Normocephalic and atraumatic.      Nose: Nose normal. No congestion.      Mouth/Throat:      Mouth: Mucous membranes are moist.      Pharynx: Oropharynx is clear. No oropharyngeal exudate.   Eyes:      Extraocular Movements: Extraocular movements intact.      Conjunctiva/sclera: Conjunctivae normal.      Pupils: Pupils are equal, round, and reactive to light.   Neck:      Comments: Cuffed 8 trach   Cardiovascular:      Rate and Rhythm: Normal rate and regular rhythm.      Pulses: Normal pulses.      Heart sounds: No murmur heard.  Pulmonary:      Effort: Tachypnea and respiratory distress present.      Breath sounds: Wheezing and rhonchi present.   Abdominal:      General: Abdomen is flat. Bowel sounds are normal.      Palpations: Abdomen is soft.   Neurological:      Comments: Not responding to commands or voice      Review of Systems    Vents:  Vent Mode: PRVC (04/11/23 1134)  Ventilator Initiated: Yes (04/11/23 0936)  Set Rate: 24 BPM (04/11/23 1134)  Vt Set: 400 mL (04/11/23  1134)  Pressure Support: 5 cmH20 (03/23/23 2228)  PEEP/CPAP: 5 cmH20 (04/11/23 1134)  Oxygen Concentration (%): 60 (04/11/23 1134)  Peak Airway Pressure: 20.8 cmH20 (04/11/23 1134)  Total Ve: 8.64 L/m (04/11/23 1134)  F/VT Ratio<105 (RSBI): (!) 66.82 (04/11/23 1134)    Lines/Drains/Airways       Peripherally Inserted Central Catheter Line  Duration             PICC Triple Lumen 03/20/23 2145 right basilic 21 days              Drain  Duration                  Gastrostomy/Enterostomy 01/04/22 Percutaneous endoscopic gastrostomy (PEG)  days              Airway  Duration             Adult Surgical Airway 03/16/23 1014 Shiley Uncuffed 6.0 26 days                    Significant Labs:    CBC/Anemia Profile:  Recent Labs   Lab 04/10/23  0332 04/11/23  0843   WBC 6.93 6.76   HGB 9.7* 9.4*   HCT 33.1* 31.2*    201   MCV 99* 100*   RDW 18.1* 18.0*        Chemistries:  Recent Labs   Lab 04/10/23  0332 04/11/23  0843    140   K 3.7 4.2   CL 99 101   CO2 33* 35*   BUN 16 15   CREATININE 0.7 0.8   CALCIUM 8.8 8.7   ALBUMIN  --  2.3*   PROT  --  5.7*   BILITOT  --  0.5   ALKPHOS  --  112   ALT  --  22   AST  --  29       All pertinent labs within the past 24 hours have been reviewed.    Significant Imaging:  I have reviewed all pertinent imaging results/findings within the past 24 hours.

## 2023-04-11 NOTE — ASSESSMENT & PLAN NOTE
Continue duonebs but not suspecting this as the primary  at this time.  - thick secretions have been an issue, adding cpt and mucomyst nebs to assist with thinning secretions for clearance.

## 2023-04-11 NOTE — SUBJECTIVE & OBJECTIVE
Medications:  Continuous Infusions:   NORepinephrine bitartrate-D5W 0.05 mcg/kg/min (23 1201)     Scheduled Meds:   albuterol-ipratropium  3 mL Nebulization Q4H    aspirin  81 mg Oral Daily    atorvastatin  80 mg Per G Tube Daily    ceFEPime (MAXIPIME) IVPB  2 g Intravenous Q8H    chlorhexidine  15 mL Mouth/Throat BID    clopidogreL  75 mg Oral Daily    famotidine (PF)  20 mg Intravenous BID    ferrous sulfate  1 tablet Per G Tube Daily    glycopyrrolate  1 mg Per G Tube TID    LIDOcaine  2 patch Transdermal Q24H    melatonin  9 mg Per G Tube Nightly    metoprolol tartrate  25 mg Per G Tube BID    sodium chloride 0.9%  10 mL Intravenous Q6H    sulfamethoxazole-trimethoprim 800-160mg  1 tablet Per G Tube BID    vancomycin (VANCOCIN) IVPB  1,750 mg Intravenous Once     PRN Meds:acetaminophen, albuterol-ipratropium, atropine, bisacodyL, [] fentaNYL **FOLLOWED BY** fentaNYL, guaiFENesin 100 mg/5 ml, insulin aspart U-100, loperamide, naloxone, nitroGLYCERIN, ondansetron, oxyCODONE, phenyleph-min oil-petrolatum, Flushing PICC Protocol **AND** sodium chloride 0.9% **AND** sodium chloride 0.9%, Pharmacy to dose Vancomycin consult **AND** vancomycin - pharmacy to dose    Objective:     Vital Signs (Most Recent):  Temp: 98.3 °F (36.8 °C) (23 1000)  Pulse: 83 (23 1134)  Resp: 20 (23 1224)  BP: (!) 79/50 (23 1100)  SpO2: 98 % (23 1134)   Vital Signs (24h Range):  Temp:  [96.6 °F (35.9 °C)-98.3 °F (36.8 °C)] 98.3 °F (36.8 °C)  Pulse:  [] 83  Resp:  [18.1-26] 20  SpO2:  [90 %-100 %] 98 %  BP: ()/(50-92) 79/50     Weight: 66.6 kg (146 lb 13.2 oz)  Body mass index is 22.32 kg/m².    Physical Exam  Vitals and nursing note reviewed.   Constitutional:       General: He is in acute distress.      Comments: Lying in bed with eyes closed; did not respond to name   HENT:      Head:      Comments: Temporal wasting  Cardiovascular:      Rate and Rhythm: Normal rate and regular rhythm.    Pulmonary:      Effort: No respiratory distress.      Comments: Ventilator to trach   Abdominal:      Comments: PEG    Musculoskeletal:      Right lower leg: No edema.      Left lower leg: No edema.      Comments: Muscular wasting    Neurological:      Mental Status: He is disoriented.      Comments: Not responding to name or commands; twitching/non-purposeful movements of upper extremities (sometimes correlated to noises in rooms)      Advance Care Planning   Advance Directives:   Living Will: No    LaPOST: No    Do Not Resuscitate Status: No    Medical Power of : No    Goals of Care: What is most important right now is to focus on spending time at home, remaining as independent as possible, symptom/pain control, curative/life-prolongation (regardless of treatment burdens). Accordingly, we have decided that the best plan to meet the patient's goals includes continuing with treatment.     Significant Labs: All pertinent labs within the past 24 hours have been reviewed.  CBC:   Recent Labs   Lab 04/11/23  0843   WBC 6.76   HGB 9.4*   HCT 31.2*   *        BMP:  Recent Labs   Lab 04/11/23 0843   *      K 4.2      CO2 35*   BUN 15   CREATININE 0.8   CALCIUM 8.7     LFT:  Lab Results   Component Value Date    AST 29 04/11/2023    ALKPHOS 112 04/11/2023    BILITOT 0.5 04/11/2023     Albumin:   Albumin   Date Value Ref Range Status   04/11/2023 2.3 (L) 3.5 - 5.2 g/dL Final     Protein:   Total Protein   Date Value Ref Range Status   04/11/2023 5.7 (L) 6.0 - 8.4 g/dL Final     Lactic acid:   Lab Results   Component Value Date    LACTATE 0.5 04/11/2023    LACTATE 1.8 03/19/2023     Significant Imaging: I have reviewed all pertinent imaging results/findings within the past 24 hours.

## 2023-04-11 NOTE — NURSING
[facility] [place]  Nurses Note -- 4 Eyes      4/11/2023       Skin assessed on: Q Shift      [x] No Pressure Injuries Present    []Prevention Measures Documented    [] Yes LDA  for Pressure Injury Previously documented     [] Yes New Pressure Injury Discovered   [] LDA for New Pressure Injury Added      Attending RN:  Lida Mansfield RN     Second RN:  Laisha Guerra RN

## 2023-04-11 NOTE — SUBJECTIVE & OBJECTIVE
Interval History: Lethargic, does not follow commands this morning. More labored breathing.     Review of Systems   Unable to perform ROS: Mental status change   Respiratory:  Positive for shortness of breath.    Psychiatric/Behavioral:  Positive for confusion.    Objective:     Vital Signs (Most Recent):  Temp: 97.6 °F (36.4 °C) (04/11/23 0723)  Pulse: 106 (04/11/23 0814)  Resp: (!) 22 (04/11/23 0814)  BP: 132/72 (04/11/23 0723)  SpO2: (!) 92 % (04/11/23 0814)   Vital Signs (24h Range):  Temp:  [96.6 °F (35.9 °C)-97.7 °F (36.5 °C)] 97.6 °F (36.4 °C)  Pulse:  [] 106  Resp:  [19-26] 22  SpO2:  [90 %-95 %] 92 %  BP: (132-160)/(66-92) 132/72     Weight: 66.6 kg (146 lb 13.2 oz)  Body mass index is 22.32 kg/m².    Intake/Output Summary (Last 24 hours) at 4/11/2023 0919  Last data filed at 4/11/2023 0621  Gross per 24 hour   Intake 780 ml   Output 1000 ml   Net -220 ml        Physical Exam  Vitals and nursing note reviewed.   Constitutional:       General: He is in acute distress.      Appearance: He is ill-appearing and toxic-appearing.   HENT:      Head: Normocephalic and atraumatic.      Comments: Temporal wasting     Nose: Nose normal.      Mouth/Throat:      Comments: Edentulous. Dried blood in mouth  Neck:      Comments: Trach in place, yellow secretions  Cardiovascular:      Rate and Rhythm: Regular rhythm. Tachycardia present.   Pulmonary:      Effort: No respiratory distress.      Breath sounds: Rhonchi present. No wheezing or rales.      Comments: Coarse bilaterally. On trach collar 16 L  Abdominal:      General: Bowel sounds are normal.      Palpations: Abdomen is soft.      Comments: PEG in place   Musculoskeletal:      Right lower leg: No edema.      Left lower leg: No edema.      Comments: Muscular wasting   Skin:     General: Skin is warm and dry.   Neurological:      Mental Status: He is disoriented.      Comments: Moving arms and legs, grabbing for things. Does not answer questions or follow  commands       Significant Labs: All pertinent labs within the past 24 hours have been reviewed.    Significant Imaging: I have reviewed all pertinent imaging results/findings within the past 24 hours.

## 2023-04-11 NOTE — PROGRESS NOTES
Pharmacokinetic Initial Assessment: IV Vancomycin    Assessment/Plan:    Initiate intravenous vancomycin with loading dose of 1750 mg once followed by a maintenance dose of vancomycin 750mg IV every 12 hours  Desired empiric serum trough concentration is 10 to 20 mcg/mL  Draw vancomycin trough level 60 min prior to fourth dose on 4/13/23 at approximately 12am  Pharmacy will continue to follow and monitor vancomycin.      Please contact pharmacy at extension [phone] with any questions regarding this assessment.     Thank you for the consult,   Neeta Altamirano       Patient brief summary:  Fareed Richard Jr. is a 73 y.o. male initiated on antimicrobial therapy with IV Vancomycin for treatment of suspected bacteremia    Drug Allergies:   Review of patient's allergies indicates:   Allergen Reactions    Ativan [lorazepam] Anxiety       Actual Body Weight:   66.6 kg     Renal Function:   Estimated Creatinine Clearance: 76.3 mL/min (based on SCr of 0.8 mg/dL).,     Dialysis Method (if applicable):  N/A    CBC (last 72 hours):  Recent Labs   Lab Result Units 04/10/23  0332 04/11/23  0843   WBC K/uL 6.93 6.76   Hemoglobin g/dL 9.7* 9.4*   Hematocrit % 33.1* 31.2*   Platelets K/uL 203 201   Gran % % 87.6* 86.5*   Lymph % % 3.5* 4.1*   Mono % % 7.1 7.5   Eosinophil % % 1.3 1.2   Basophil % % 0.1 0.3   Differential Method  Automated Automated       Metabolic Panel (last 72 hours):  Recent Labs   Lab Result Units 04/10/23  0332 04/11/23  0843 04/11/23  1222   Sodium mmol/L 140 140  --    Potassium mmol/L 3.7 4.2  --    Chloride mmol/L 99 101  --    CO2 mmol/L 33* 35*  --    Glucose mg/dL 146* 174*  --    Glucose, UA   --   --  Negative   BUN mg/dL 16 15  --    Creatinine mg/dL 0.7 0.8  --    Albumin g/dL  --  2.3*  --    Total Bilirubin mg/dL  --  0.5  --    Alkaline Phosphatase U/L  --  112  --    AST U/L  --  29  --    ALT U/L  --  22  --        Drug levels (last 3 results):  No results for input(s): VANCOMYCINRA, VANCORANDOM,  VANCOMYCINPE, VANCOPEAK, VANCOMYCINTR, VANCOTROUGH in the last 72 hours.    Microbiologic Results:  Microbiology Results (last 7 days)       Procedure Component Value Units Date/Time    Culture, Respiratory with Gram Stain [[id number]] Collected: 04/11/23 1438    Order Status: Sent Specimen: Respiratory from Tracheal Aspirate Updated: 04/11/23 1440    Blood culture [[id number]] Collected: 04/11/23 1231    Order Status: Sent Specimen: Blood Updated: 04/11/23 1339    Blood culture [[id number]] Collected: 04/11/23 1240    Order Status: Sent Specimen: Blood from Antecubital, Left Arm Updated: 04/11/23 1243    Narrative:      hard stick

## 2023-04-11 NOTE — PT/OT/SLP PROGRESS
Occupational Therapy      Patient Name:  Fareed Richard Jr.   MRN:  [id number]    Patient not seen today secondary to Transfer to ICU. Pt lethargic with worsening respiratory status per chart. Will await clearance and follow up when appropriate.    4/11/2023

## 2023-04-11 NOTE — NURSING
Patient in bed restless & jumpy, alert but drowsy, respiratory in room doing trach care, MD in room ABG's ordered & resulted, worsening respiratory status noted,to be transferred to  per order.

## 2023-04-11 NOTE — ASSESSMENT & PLAN NOTE
Bp downtrended throughout the day  - sepsis vs obstructive vs cardiogenic  - started on bactrim for steno in his previous sputum culture  - if not improving, consider CTA for PE   - may need repeat echo as well if other causes are not ruled out.

## 2023-04-11 NOTE — PROGRESS NOTES
"[facility] Intensive Care  Critical Care Medicine  Progress Note    Patient Name: Fareed Richard Jr.  MRN: [id number]  Admission Date: 3/18/2023  Hospital Length of Stay: 23 days  Code Status: Full Code  Attending Provider: Caprice Nava MD  Primary Care Provider: Kodi Tubbs MD   Principal Problem: Acute on chronic respiratory failure with hypoxia and hypercapnia    Subjective:     HPI:  Asked to evaluate for "hypotension and bleeding from trach site (replaced a few days ago)".    He was seen in ENT clinic by Dr. Smalls on 3/16 for routinely scheduled follow up and trach change.  He was having some thick yellow secretions with perhaps a hint of pink from the trach at time even before the trach exchange.  Subsequently, he was noted to have increase in congestion with more bloody-looking sputum.  He denies fever/chills/night sweats but has been more congested with dyspnea noted.  The day of presentation to the ED (3/18), secretions were frankly bloody => so he came to the ED.  He denies prior similar episodes.  He is on chronic aspirin and Plavix due to severe vascular disease with past stenting.    Review of past chest CT from October 2022 shows severe peripheral bullous changes, as well as centrilobular emphysema changes.  There is a modest left pleural effusion and left upper lobe nodular opacity.  There is bronchiectasis and diffuse micronodular changes in a tree-in-bud configuration to suggest chronic small airway inflammation/infection.  Chest CT at this time shows similar findings but now shows increased bibasilar air-space disease with consolidation/air bronchograms consistent with pneumonia.  Procalcitonin is normal.  Troponin has bumped up since admission to suggest possible demand ischemia in the setting of worsening anemia and hypotension.        Patient previously seen in Pulmonary Clinic by Dr. Wadsworth in October 2022.  From Dr. Wadsworth's note:    Fareed Richard Jr. is a 72 y.o. male who presents for " evaluation of chronic hypoxemic respiratory failure. He coughs daily, particularly in the evening. The cough is productive of yellow mucus which he expectorates without difficult via his trach. He did not experience chronic cough prior to his trach. He experiences chronic dyspnea on exertion. He takes wixela inhaler 1 puff BID (still inhales through his mouth) & an albuterol nebulizer. He has been taking prednisone for the past six weeks (for copd?). He takes glycopyrrolate due to constant oral secretions.      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 S/p tracheostomy, neck dissection, & total glossectomy January 2022.     HeSince his last visit he was hospitalized for acute respiratory failure requiring intubation and had an extended hospital course; admitted 4/19/22 and discharged 5/12/22.  3 month post-treatment PET CT reviewed; no evidence of local residual or recurrent disease.  Pulmonary findings likely reflect sequelae of recent hospitalization.  Concern for active inflammation going on; agree with starting steroids and getting PFTs.  Will need repeat CT chest in 8 weeks and follow up with pulmonology.     PMH: CAD status post RCA PCI in 2000, carotid stenosis status post carotid enterectomy in 2018, PAD status post bilateral femoral atherectomy and hypertension.     He is a former 2PPD smoker; he quit six years ago.        Hospital/ICU Course:  No notes on file    Interval History: Mr. Richard became more confused and lethargic on the floor and was found to be acidotic and hypercapnic. He was transferred back to the ICU and placed on the ventilator.       Objective:     Vital Signs (Most Recent):  Temp: 98.3 °F (36.8 °C) (04/11/23 1000)  Pulse: 83 (04/11/23 1134)  Resp: 20 (04/11/23 1224)  BP: (!) 79/50 (04/11/23 1100)  SpO2: 98 % (04/11/23 1134)   Vital Signs (24h Range):  Temp:  [96.6 °F (35.9 °C)-98.3 °F (36.8 °C)] 98.3 °F (36.8 °C)  Pulse:  [] 83  Resp:   [18.1-26] 20  SpO2:  [90 %-100 %] 98 %  BP: ()/(50-92) 79/50     Weight: 66.6 kg (146 lb 13.2 oz)  Body mass index is 22.32 kg/m².      Intake/Output Summary (Last 24 hours) at 4/11/2023 1251  Last data filed at 4/11/2023 1000  Gross per 24 hour   Intake 820 ml   Output 800 ml   Net 20 ml       Physical Exam  Vitals reviewed.   Constitutional:       Appearance: He is ill-appearing (chronically ill) and toxic-appearing.   HENT:      Head: Normocephalic and atraumatic.      Nose: Nose normal. No congestion.      Mouth/Throat:      Mouth: Mucous membranes are moist.      Pharynx: Oropharynx is clear. No oropharyngeal exudate.   Eyes:      Extraocular Movements: Extraocular movements intact.      Conjunctiva/sclera: Conjunctivae normal.      Pupils: Pupils are equal, round, and reactive to light.   Neck:      Comments: Cuffed 8 trach   Cardiovascular:      Rate and Rhythm: Normal rate and regular rhythm.      Pulses: Normal pulses.      Heart sounds: No murmur heard.  Pulmonary:      Effort: Tachypnea and respiratory distress present.      Breath sounds: Wheezing and rhonchi present.   Abdominal:      General: Abdomen is flat. Bowel sounds are normal.      Palpations: Abdomen is soft.   Neurological:      Comments: Not responding to commands or voice      Review of Systems    Vents:  Vent Mode: PRVC (04/11/23 1134)  Ventilator Initiated: Yes (04/11/23 0936)  Set Rate: 24 BPM (04/11/23 1134)  Vt Set: 400 mL (04/11/23 1134)  Pressure Support: 5 cmH20 (03/23/23 2228)  PEEP/CPAP: 5 cmH20 (04/11/23 1134)  Oxygen Concentration (%): 60 (04/11/23 1134)  Peak Airway Pressure: 20.8 cmH20 (04/11/23 1134)  Total Ve: 8.64 L/m (04/11/23 1134)  F/VT Ratio<105 (RSBI): (!) 66.82 (04/11/23 1134)    Lines/Drains/Airways       Peripherally Inserted Central Catheter Line  Duration             PICC Triple Lumen 03/20/23 2145 right basilic 21 days              Drain  Duration                  Gastrostomy/Enterostomy 01/04/22  Percutaneous endoscopic gastrostomy (PEG)  days              Airway  Duration             Adult Surgical Airway 03/16/23 1014 Shiley Uncuffed 6.0 26 days                    Significant Labs:    CBC/Anemia Profile:  Recent Labs   Lab 04/10/23  0332 04/11/23  0843   WBC 6.93 6.76   HGB 9.7* 9.4*   HCT 33.1* 31.2*    201   MCV 99* 100*   RDW 18.1* 18.0*        Chemistries:  Recent Labs   Lab 04/10/23  0332 04/11/23  0843    140   K 3.7 4.2   CL 99 101   CO2 33* 35*   BUN 16 15   CREATININE 0.7 0.8   CALCIUM 8.8 8.7   ALBUMIN  --  2.3*   PROT  --  5.7*   BILITOT  --  0.5   ALKPHOS  --  112   ALT  --  22   AST  --  29       All pertinent labs within the past 24 hours have been reviewed.    Significant Imaging:  I have reviewed all pertinent imaging results/findings within the past 24 hours.      ABG  Recent Labs   Lab 04/11/23  1030   PH 7.224*   PO2 56   PCO2 98.2*   HCO3 40.6*   BE 10     Assessment/Plan:     ENT  Tracheostomy present  Tracheostomy in place since surgical resection for head/neck cancer in January 2022.  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    No signs of ongoing bleeding at the moment     Pulmonary  * Acute on chronic respiratory failure with hypoxia and hypercapnia  Vent Mode: PRVC  Oxygen Concentration (%):  [] 60  Resp Rate Total:  [18 br/min-24 br/min] 24 br/min  Vt Set:  [400 mL] 400 mL  PEEP/CPAP:  [5 cmH20] 5 cmH20  Mean Airway Pressure:  [8.8 weB05-77.3 cmH20] 10.2 cmH20  Placed back on the ventilator for hypercapnea  -follow up VBG for improvement  - hypoxia and hypercapnea - weaning O2 for goal sat of 88% and above  - COPD treatment as above  - sputum culture- no clear signs of infection on CXR  - consider diuresis but now hypotensive so more concerning for sepsis vs possible cardiac event.   - can also consider PE- has been on DAPT and no signs of significant bleeding at this  time can consider adding back dvt ppx    Hemoptysis  Likely secondary to lower respiratory tract infection in the setting of ASA/Plavix.  It seems less likely to be physical complication of the tracheostomy tube itself.    · Antibiotics for pneumonia.  · Check sputum for culture, including AFB for possible MAC infection.  · Respiratory viral panel.  · OK to resume anti-platelet medications; monitor for bleeding   · No need for additional nebulized TXA.  No bleeding since 3/19  · Appreciate ENT evaluation     COPD exacerbation  Continue duonebs but not suspecting this as the primary  at this time.  - thick secretions have been an issue, adding cpt and mucomyst nebs to assist with thinning secretions for clearance.       Cardiac/Vascular  Shock, unspecified  Bp downtrended throughout the day  - sepsis vs obstructive vs cardiogenic  - started on bactrim for steno in his previous sputum culture  - if not improving, consider CTA for PE   - may need repeat echo as well if other causes are not ruled out.     Critical Care Time: 60 minutes  Critical secondary to Patient has a condition that poses threat to life and bodily function: Severe Respiratory Distress      Critical care was time spent personally by me on the following activities: development of treatment plan with patient or surrogate and bedside caregivers, discussions with consultants, evaluation of patient's response to treatment, examination of patient, ordering and performing treatments and interventions, ordering and review of laboratory studies, ordering and review of radiographic studies, pulse oximetry, re-evaluation of patient's condition. This critical care time did not overlap with that of any other provider or involve time for any procedures.     Alessandra Meza MD  Critical Care Medicine  [facility] - Intensive Care

## 2023-04-11 NOTE — PROGRESS NOTES
RAPID RESPONSE NURSE PROACTIVE ROUNDING NOTE       Time of Visit: 9:12 AM    Admit Date: 3/18/2023  LOS: 23  Code Status: Full Code   Date of Visit: 2023  : 1950  Age: 73 y.o.  Sex: male  Race: White  Bed: W421/W421 A:   MRN: [id number]  Was the patient discharged from an ICU this admission? Yes   Was the patient discharged from a PACU within last 24 hours? No   Did the patient receive conscious sedation/general anesthesia in last 24 hours? No   Was the patient in the ED within the past 24 hours? No   Was the patient on NIPPV within the past 24 hours? No   Attending Physician: Caprice Nava MD  Primary Service: Hospitalist   Time spent at the bedside: < 15 min    SITUATION    Notified by  during rounding on floor  Reason for alert: decompensation of resp and mental status    Diagnosis: Acute on chronic respiratory failure with hypoxia   has a past medical history of Cancer, COPD (chronic obstructive pulmonary disease), Hyperlipidemia, Hypertension, and Pseudoaneurysm.    Last Vitals:  Temp: 97.6 °F (36.4 °C) (723)  Pulse: 106 (814)  Resp: 22 (814)  BP: 132/72 (723)  SpO2: 92 % (814)    24 Hour Vitals Range:  Temp:  [96.6 °F (35.9 °C)-97.7 °F (36.5 °C)]   Pulse:  []   Resp:  [19-26]   BP: (132-160)/(66-92)   SpO2:  [90 %-95 %]     Clinical Issues: Respiratory    ASSESSMENT/INTERVENTIONS    Pt asleep, awakens to voice, lethargic, tachypneic     RECOMMENDATIONS  Cxr already done. Ventilator support/ icu- ordered per MD Mchugh    Discussed plan of care with Charge RNMaureen    PROVIDER ESCALATION    Physician escalation: Yes    Orders received and case discussed with Md Nava     Disposition:Tx in ICU bed 274    FOLLOW UP    Call back the Rapid Response NurseLucy at [phone] for additional questions or concerns.

## 2023-04-12 LAB
ALBUMIN SERPL BCP-MCNC: 2 G/DL (ref 3.5–5.2)
ALP SERPL-CCNC: 99 U/L (ref 55–135)
ALT SERPL W/O P-5'-P-CCNC: 17 U/L (ref 10–44)
ANION GAP SERPL CALC-SCNC: 3 MMOL/L (ref 8–16)
AST SERPL-CCNC: 23 U/L (ref 10–40)
BASOPHILS # BLD AUTO: 0.01 K/UL (ref 0–0.2)
BASOPHILS NFR BLD: 0.2 % (ref 0–1.9)
BILIRUB SERPL-MCNC: 0.4 MG/DL (ref 0.1–1)
BUN SERPL-MCNC: 16 MG/DL (ref 8–23)
CALCIUM SERPL-MCNC: 8.4 MG/DL (ref 8.7–10.5)
CHLORIDE SERPL-SCNC: 101 MMOL/L (ref 95–110)
CO2 SERPL-SCNC: 32 MMOL/L (ref 23–29)
CREAT SERPL-MCNC: 0.8 MG/DL (ref 0.5–1.4)
DIFFERENTIAL METHOD: ABNORMAL
EOSINOPHIL # BLD AUTO: 0.1 K/UL (ref 0–0.5)
EOSINOPHIL NFR BLD: 2.2 % (ref 0–8)
ERYTHROCYTE [DISTWIDTH] IN BLOOD BY AUTOMATED COUNT: 17.8 % (ref 11.5–14.5)
EST. GFR  (NO RACE VARIABLE): >60 ML/MIN/1.73 M^2
GLUCOSE SERPL-MCNC: 182 MG/DL (ref 70–110)
HCT VFR BLD AUTO: 28 % (ref 40–54)
HGB BLD-MCNC: 8.4 G/DL (ref 14–18)
IMM GRANULOCYTES # BLD AUTO: 0.02 K/UL (ref 0–0.04)
IMM GRANULOCYTES NFR BLD AUTO: 0.3 % (ref 0–0.5)
LYMPHOCYTES # BLD AUTO: 0.4 K/UL (ref 1–4.8)
LYMPHOCYTES NFR BLD: 6 % (ref 18–48)
MCH RBC QN AUTO: 29.7 PG (ref 27–31)
MCHC RBC AUTO-ENTMCNC: 30 G/DL (ref 32–36)
MCV RBC AUTO: 99 FL (ref 82–98)
MONOCYTES # BLD AUTO: 0.7 K/UL (ref 0.3–1)
MONOCYTES NFR BLD: 11.3 % (ref 4–15)
NEUTROPHILS # BLD AUTO: 4.7 K/UL (ref 1.8–7.7)
NEUTROPHILS NFR BLD: 80 % (ref 38–73)
NRBC BLD-RTO: 0 /100 WBC
PLATELET # BLD AUTO: 214 K/UL (ref 150–450)
PMV BLD AUTO: 10.2 FL (ref 9.2–12.9)
POCT GLUCOSE: 120 MG/DL (ref 70–110)
POCT GLUCOSE: 129 MG/DL (ref 70–110)
POCT GLUCOSE: 153 MG/DL (ref 70–110)
POCT GLUCOSE: 155 MG/DL (ref 70–110)
POCT GLUCOSE: 159 MG/DL (ref 70–110)
POCT GLUCOSE: 161 MG/DL (ref 70–110)
POCT GLUCOSE: 176 MG/DL (ref 70–110)
POTASSIUM SERPL-SCNC: 4.3 MMOL/L (ref 3.5–5.1)
PROT SERPL-MCNC: 5.4 G/DL (ref 6–8.4)
RBC # BLD AUTO: 2.83 M/UL (ref 4.6–6.2)
SODIUM SERPL-SCNC: 136 MMOL/L (ref 136–145)
WBC # BLD AUTO: 5.82 K/UL (ref 3.9–12.7)

## 2023-04-12 PROCEDURE — 25000003 PHARM REV CODE 250: Performed by: HOSPITALIST

## 2023-04-12 PROCEDURE — 80053 COMPREHEN METABOLIC PANEL: CPT | Performed by: HOSPITALIST

## 2023-04-12 PROCEDURE — 99497 PR ADVNCD CARE PLAN 30 MIN: ICD-10-PCS | Mod: ,,, | Performed by: REGISTERED NURSE

## 2023-04-12 PROCEDURE — 94761 N-INVAS EAR/PLS OXIMETRY MLT: CPT

## 2023-04-12 PROCEDURE — 85025 COMPLETE CBC W/AUTO DIFF WBC: CPT | Performed by: HOSPITALIST

## 2023-04-12 PROCEDURE — 99291 PR CRITICAL CARE, E/M 30-74 MINUTES: ICD-10-PCS | Mod: ,,, | Performed by: INTERNAL MEDICINE

## 2023-04-12 PROCEDURE — 99233 SBSQ HOSP IP/OBS HIGH 50: CPT | Mod: ,,, | Performed by: REGISTERED NURSE

## 2023-04-12 PROCEDURE — 80202 ASSAY OF VANCOMYCIN: CPT | Performed by: HOSPITALIST

## 2023-04-12 PROCEDURE — 99900035 HC TECH TIME PER 15 MIN (STAT)

## 2023-04-12 PROCEDURE — 94003 VENT MGMT INPAT SUBQ DAY: CPT

## 2023-04-12 PROCEDURE — 25000242 PHARM REV CODE 250 ALT 637 W/ HCPCS: Performed by: HOSPITALIST

## 2023-04-12 PROCEDURE — A4216 STERILE WATER/SALINE, 10 ML: HCPCS | Performed by: HOSPITALIST

## 2023-04-12 PROCEDURE — 99497 ADVNCD CARE PLAN 30 MIN: CPT | Mod: ,,, | Performed by: REGISTERED NURSE

## 2023-04-12 PROCEDURE — 94640 AIRWAY INHALATION TREATMENT: CPT

## 2023-04-12 PROCEDURE — 63600175 PHARM REV CODE 636 W HCPCS: Performed by: HOSPITALIST

## 2023-04-12 PROCEDURE — 94668 MNPJ CHEST WALL SBSQ: CPT

## 2023-04-12 PROCEDURE — 25000242 PHARM REV CODE 250 ALT 637 W/ HCPCS: Performed by: INTERNAL MEDICINE

## 2023-04-12 PROCEDURE — 99233 PR SUBSEQUENT HOSPITAL CARE,LEVL III: ICD-10-PCS | Mod: ,,, | Performed by: REGISTERED NURSE

## 2023-04-12 PROCEDURE — 20000000 HC ICU ROOM

## 2023-04-12 PROCEDURE — 94760 N-INVAS EAR/PLS OXIMETRY 1: CPT

## 2023-04-12 PROCEDURE — 36415 COLL VENOUS BLD VENIPUNCTURE: CPT | Performed by: HOSPITALIST

## 2023-04-12 PROCEDURE — 99900026 HC AIRWAY MAINTENANCE (STAT)

## 2023-04-12 PROCEDURE — 99291 CRITICAL CARE FIRST HOUR: CPT | Mod: ,,, | Performed by: INTERNAL MEDICINE

## 2023-04-12 RX ORDER — AMOXICILLIN 250 MG
2 CAPSULE ORAL 2 TIMES DAILY
Status: DISCONTINUED | OUTPATIENT
Start: 2023-04-12 | End: 2023-04-30 | Stop reason: HOSPADM

## 2023-04-12 RX ADMIN — VANCOMYCIN HYDROCHLORIDE 750 MG: 750 INJECTION, POWDER, LYOPHILIZED, FOR SOLUTION INTRAVENOUS at 02:04

## 2023-04-12 RX ADMIN — SULFAMETHOXAZOLE AND TRIMETHOPRIM 1 TABLET: 800; 160 TABLET ORAL at 08:04

## 2023-04-12 RX ADMIN — ACETYLCYSTEINE 4 ML: 100 SOLUTION ORAL; RESPIRATORY (INHALATION) at 08:04

## 2023-04-12 RX ADMIN — MELATONIN TAB 3 MG 9 MG: 3 TAB at 08:04

## 2023-04-12 RX ADMIN — CHLORHEXIDINE GLUCONATE 0.12% ORAL RINSE 15 ML: 1.2 LIQUID ORAL at 08:04

## 2023-04-12 RX ADMIN — IPRATROPIUM BROMIDE AND ALBUTEROL SULFATE 3 ML: 2.5; .5 SOLUTION RESPIRATORY (INHALATION) at 08:04

## 2023-04-12 RX ADMIN — ACETYLCYSTEINE 4 ML: 100 SOLUTION ORAL; RESPIRATORY (INHALATION) at 12:04

## 2023-04-12 RX ADMIN — IPRATROPIUM BROMIDE AND ALBUTEROL SULFATE 3 ML: 2.5; .5 SOLUTION RESPIRATORY (INHALATION) at 12:04

## 2023-04-12 RX ADMIN — ASPIRIN 81 MG CHEWABLE TABLET 81 MG: 81 TABLET CHEWABLE at 08:04

## 2023-04-12 RX ADMIN — FAMOTIDINE 20 MG: 10 INJECTION, SOLUTION INTRAVENOUS at 08:04

## 2023-04-12 RX ADMIN — GLYCOPYRROLATE 1 MG: 1 TABLET ORAL at 08:04

## 2023-04-12 RX ADMIN — IPRATROPIUM BROMIDE AND ALBUTEROL SULFATE 3 ML: 2.5; .5 SOLUTION RESPIRATORY (INHALATION) at 04:04

## 2023-04-12 RX ADMIN — SENNOSIDES AND DOCUSATE SODIUM 2 TABLET: 50; 8.6 TABLET ORAL at 08:04

## 2023-04-12 RX ADMIN — Medication 10 ML: at 02:04

## 2023-04-12 RX ADMIN — GLYCOPYRROLATE 1 MG: 1 TABLET ORAL at 02:04

## 2023-04-12 RX ADMIN — CEFEPIME HYDROCHLORIDE 2 G: 2 INJECTION, SOLUTION INTRAVENOUS at 01:04

## 2023-04-12 RX ADMIN — FERROUS SULFATE TAB 325 MG (65 MG ELEMENTAL FE) 1 EACH: 325 (65 FE) TAB at 08:04

## 2023-04-12 RX ADMIN — METOPROLOL TARTRATE 25 MG: 25 TABLET, FILM COATED ORAL at 08:04

## 2023-04-12 RX ADMIN — CEFEPIME HYDROCHLORIDE 2 G: 2 INJECTION, SOLUTION INTRAVENOUS at 08:04

## 2023-04-12 RX ADMIN — ATORVASTATIN CALCIUM 80 MG: 40 TABLET, FILM COATED ORAL at 08:04

## 2023-04-12 RX ADMIN — CLOPIDOGREL BISULFATE 75 MG: 75 TABLET ORAL at 08:04

## 2023-04-12 RX ADMIN — CEFEPIME HYDROCHLORIDE 2 G: 2 INJECTION, SOLUTION INTRAVENOUS at 05:04

## 2023-04-12 NOTE — ASSESSMENT & PLAN NOTE
At home is on 5L O2 via trach and O2 sats typically in high 80s low 90s. Worsening respiration this admission due to aspiration of blood and likely food aspiration. Required ventilator from 3/21 to 3/23, subsequently weaned. Treated empirically for pneumonia. Respiratory cultures show Stenotrophomonas which was not treated  - started bactrim to treat Stenotrophomonas  - worsening hypoxia and hypercapnia on 4/11. This could be related to trazodone use (attempted 4/10 PM for sleep)   - transferred to ICU and placed on ventilator   - treating potential pneumonia. Known Stenotrophomonas- treating with bactrim. Also on vanc/cefepime while awaiting further cultures   - does not appear that he has had another MI   - significantly improved on 4/12   - Pulmonary is following

## 2023-04-12 NOTE — PT/OT/SLP PROGRESS
Occupational Therapy      Patient Name:  Fareed Richard Jr.   MRN:  [id number]    Patient not seen today secondary to transfer to ICU w/ increased O2 demands; pt currently on the vent. Will follow-up as appropriate.    4/12/2023

## 2023-04-12 NOTE — ASSESSMENT & PLAN NOTE
Anxiety waxes and wanes with clinical status. He also has delirium  - Stopped hydroxyzine as this could be contributing to delirium   - Prior bad reaction to ativan  - Attempted trazodone to help sleep and then had worsening encephalopathy and hypoxic/hypercapnic respiratory failure the next morning  - may try mirtazapine if this becomes an issue

## 2023-04-12 NOTE — ASSESSMENT & PLAN NOTE
4/12/2023  - interval chart reviewed in detail ; pt discussed during ICU MDT rounds and with hospital primary Dr. Nava  - pt with marked improvement to LOC and AMS; participating in conversations through mouthing and writing; he denies remembering any events from yesterday and was surprised to be in ICU and on vent this morning  - He still wishes to pursue continued treatment with goal for rehab following hospital admission   - also called pt's wife to provide brief medical update and reassure that pt is doing much better today, since she hasn't been able to visit yesterday or today due to not feeling well; she shares pt's goal for transition to rehab once stable enough to do so   - Bridgett shared her concern when pt is ready for transfer back to the floor; discussed and reassured  - emotional support provided to pt and wife during interactions; allowed time for questions/concerns   - updated hospital primary     4/11/2023  - interval chart reviewed in detail; discussed pt's return to ICU during ICU MDT rounds   - call placed to pt's wife, Bridgett, to ensure she was updated on transfer to ICU; allowed her to share her account of information provided about this and care since leaving ICU   - wife shares MDT's concerns regarding AMS, and respiratory status; concerned that pt removed his trach a few nights ago and then length of time the trach was potentially out; she is also concerned pt 's continued attempts to pull at PEG and other lines; advocating that she is ok with the use of soft restraints if needed for pt safety (discussed Ms. Urias's concerns with both Dr. Nava (primary) and Dr. Meza (PCCM)  - plan for continued GOC/ACP conversations with pt's wife while pt lacks capacity   - emotional support provided to wife; allowed time for questions/concerns, all addressed   - pt remained unresponsive throughout the day during attempted visits; on ventilator since transfer to ICU  - wife called in afternoon; brief  medical update; she shared she has been ill today (possible GI bug) and will no visit today to be safe for pt; available by phone however if pt status changes, can come if needed   - ongoing communication with MDT     Please see prior palliative notes/consult for previous GOC/ACP discussions.

## 2023-04-12 NOTE — PROGRESS NOTES
Vancomycin consult follow-up:    Patient reviewed, renal function stable, no new levels, continue current therapy; Next levels due: trough due 4/13/2023 at 1200

## 2023-04-12 NOTE — ASSESSMENT & PLAN NOTE
Continue duonebs but not suspecting this as the primary  at this time.  - thick secretions have been an issue, adding cpt and mucomyst nebs to assist with thinning secretions for clearance.   - follow up sputum culture

## 2023-04-12 NOTE — PT/OT/SLP PROGRESS
Physical Therapy      Patient Name:  Fareed Richard Jr.   MRN:  [id number]    Patient not seen today secondary to  (Transfer to ICU requiring Intubation). Will follow-up .

## 2023-04-12 NOTE — ASSESSMENT & PLAN NOTE
- PEG for feeding and meds; tube feeds typically well tolerated and no difficulty with home management by wife   - wife concerned of condition of PEG tube; original PEG tube in place; wife requesting GI consult to discuss while inpatient incase exchange is needed   - noted; management per hospital primary

## 2023-04-12 NOTE — SUBJECTIVE & OBJECTIVE
Medications:  Continuous Infusions:   NORepinephrine bitartrate-D5W Stopped (23 0000)     Scheduled Meds:   acetylcysteine 100 mg/ml (10%)  4 mL Nebulization TID WAKE    albuterol-ipratropium  3 mL Nebulization Q4H    aspirin  81 mg Oral Daily    atorvastatin  80 mg Per G Tube Daily    ceFEPime (MAXIPIME) IVPB  2 g Intravenous Q8H    chlorhexidine  15 mL Mouth/Throat BID    clopidogreL  75 mg Oral Daily    famotidine (PF)  20 mg Intravenous BID    ferrous sulfate  1 tablet Per G Tube Daily    glycopyrrolate  1 mg Per G Tube TID    LIDOcaine  2 patch Transdermal Q24H    melatonin  9 mg Per G Tube Nightly    metoprolol tartrate  25 mg Per G Tube BID    sodium chloride 0.9%  10 mL Intravenous Q6H    sulfamethoxazole-trimethoprim 800-160mg  1 tablet Per G Tube BID    vancomycin (VANCOCIN) IVPB  750 mg Intravenous Q12H     PRN Meds:acetaminophen, albuterol-ipratropium, atropine, bisacodyL, [] fentaNYL **FOLLOWED BY** fentaNYL, guaiFENesin 100 mg/5 ml, hydrOXYzine HCL, insulin aspart U-100, loperamide, naloxone, nitroGLYCERIN, ondansetron, oxyCODONE, phenyleph-min oil-petrolatum, Flushing PICC Protocol **AND** sodium chloride 0.9% **AND** sodium chloride 0.9%, Pharmacy to dose Vancomycin consult **AND** vancomycin - pharmacy to dose    Objective:     Vital Signs (Most Recent):  Temp: 98.4 °F (36.9 °C) (23 0330)  Pulse: 88 (23)  Resp: (!) 23 (23)  BP: 120/77 (23 0600)  SpO2: 100 % (23 0930)   Vital Signs (24h Range):  Temp:  [98.1 °F (36.7 °C)-98.4 °F (36.9 °C)] 98.4 °F (36.9 °C)  Pulse:  [] 88  Resp:  [20-31.9] 23  SpO2:  [86 %-100 %] 100 %  BP: ()/() 120/77     Weight: 66.6 kg (146 lb 13.2 oz)  Body mass index is 22.32 kg/m².    Physical Exam  Vitals and nursing note reviewed.   Constitutional:       General: He is not in acute distress.     Appearance: He is ill-appearing.      Comments: Lying in bed with eyes closed; did not respond to name   HENT:       Head:      Comments: Temporal wasting  Cardiovascular:      Rate and Rhythm: Normal rate and regular rhythm.   Pulmonary:      Effort: No respiratory distress.      Comments: Ventilator to trach   Abdominal:      Comments: PEG    Musculoskeletal:      Right lower leg: No edema.      Left lower leg: No edema.      Comments: Muscular wasting    Neurological:      Mental Status: He is alert. He is disoriented.      Comments: Not responding to name or commands; twitching/non-purposeful movements of upper extremities (sometimes correlated to noises in rooms)      Advance Care Planning   Advance Directives:   Living Will: No    LaPOST: No    Do Not Resuscitate Status: No    Medical Power of : No    Goals of Care: What is most important right now is to focus on spending time at home, remaining as independent as possible, symptom/pain control, curative/life-prolongation (regardless of treatment burdens). Accordingly, we have decided that the best plan to meet the patient's goals includes continuing with treatment.     Significant Labs: All pertinent labs within the past 24 hours have been reviewed.  CBC:   Recent Labs   Lab 04/12/23 0318   WBC 5.82   HGB 8.4*   HCT 28.0*   MCV 99*          BMP:  Recent Labs   Lab 04/12/23 0318   *      K 4.3      CO2 32*   BUN 16   CREATININE 0.8   CALCIUM 8.4*       LFT:  Lab Results   Component Value Date    AST 23 04/12/2023    ALKPHOS 99 04/12/2023    BILITOT 0.4 04/12/2023     Albumin:   Albumin   Date Value Ref Range Status   04/12/2023 2.0 (L) 3.5 - 5.2 g/dL Final     Protein:   Total Protein   Date Value Ref Range Status   04/12/2023 5.4 (L) 6.0 - 8.4 g/dL Final     Lactic acid:   Lab Results   Component Value Date    LACTATE 0.5 04/11/2023    LACTATE 1.8 03/19/2023     Significant Imaging: I have reviewed all pertinent imaging results/findings within the past 24 hours.

## 2023-04-12 NOTE — ASSESSMENT & PLAN NOTE
Troponin elevated when pt was upgraded to the ICU. At that time it was thought to be due to demand, however he had persistent chest pain concerning for ACS. Was started on heparin gtt and tolerated. Subsequently went to [facility] which showed diffuse disease. Pt not a candidate for CABG. He subsequently underwent PCI of LAD and LCx.  - Continue asa, plavix, statin, metoprolol

## 2023-04-12 NOTE — NURSING
[facility] [place]urses [place] -- 4 Eyes      4/11/2023       Skin assessed on: Q Shift      [x] No Pressure Injuries Present    [x]Prevention Measures Documented    [] Yes LDA  for Pressure Injury Previously documented     [] Yes New Pressure Injury Discovered   [] LDA for New Pressure Injury Added      Attending RN:  YEE PRATER RN     Second RN:  KAMILAH Giordano

## 2023-04-12 NOTE — ASSESSMENT & PLAN NOTE
Developed 4/11 but responded to IVF bolus and did not require vasopressors. This resolved. No signs of bleeding, potential PNA being treated, does not appear to have new MI.

## 2023-04-12 NOTE — PROGRESS NOTES
[facility] - Intensive Care  Palliative Medicine  Progress Note    Patient Name: Fareed Richard Jr.  MRN: [id number]  Admission Date: 3/18/2023  Hospital Length of Stay: 24 days  Code Status: Full Code   Attending Provider: Caprice Nava MD  Consulting Provider: Trupti Beck NP  Primary Care Physician: Kodi Tubbs MD  Principal Problem:Acute on chronic respiratory failure with hypoxia and hypercapnia    Patient information was obtained from patient, spouse/SO and primary team.      Assessment/Plan:   Advance Care Planning   Palliative Care  4/12/2023  - interval chart reviewed in detail ; pt discussed during ICU MDT rounds and with hospital primary Dr. Nava  - pt with marked improvement to LOC and AMS; participating in conversations through mouthing and writing; he denies remembering any events from yesterday and was surprised to be in ICU and on vent this morning  - He still wishes to pursue continued treatment with goal for rehab following hospital admission   - also called pt's wife to provide brief medical update and reassure that pt is doing much better today, since she hasn't been able to visit yesterday or today due to not feeling well; she shares pt's goal for transition to rehab once stable enough to do so   - Bridgett shared her concern when pt is ready for transfer back to the floor; discussed and reassured  - emotional support provided to pt and wife during interactions; allowed time for questions/concerns   - updated hospital primary     4/11/2023  - interval chart reviewed in detail; discussed pt's return to ICU during ICU MDT rounds   - call placed to pt's wife, Bridgett, to ensure she was updated on transfer to ICU; allowed her to share her account of information provided about this and care since leaving ICU   - wife shares MDT's concerns regarding AMS, and respiratory status; concerned that pt removed his trach a few nights ago and then length of time the trach was potentially out; she is  also concerned pt 's continued attempts to pull at PEG and other lines; advocating that she is ok with the use of soft restraints if needed for pt safety (discussed Ms. Urias's concerns with both Dr. Nava (primary) and Dr. Meza ([facility])  - plan for continued GOC/ACP conversations with pt's wife while pt lacks capacity   - emotional support provided to wife; allowed time for questions/concerns, all addressed   - pt remained unresponsive throughout the day during attempted visits; on ventilator since transfer to ICU  - wife called in afternoon; brief medical update; she shared she has been ill today (possible GI bug) and will no visit today to be safe for pt; available by phone however if pt status changes, can come if needed   - ongoing communication with MDT     Please see prior palliative notes/consult for previous GOC/ACP discussions.     Psychiatric  Anxiety  - anxiety improved compared to previous assessments  - noted; management per hospital primary     ENT  Tracheostomy present  - was initially admitted with significant bleeding to trach site, an additional episode during admission; pt self-removed trach overnight/early AM 4/9-4/10 requiring RT to re-insert trach; length of time trach was removed unclear but no respiratory distress/need for resuscitation per documentation and verbal reports from MDT   - pt has frustrations with limited ability to verbalize/communicate; can become frustrated/anxious/dyspneic at times related to this; writes in a notebook often to support communication   - noted; management per hospital primary    Cardiac/Vascular  NSTEMI (non-ST elevated myocardial infarction)  - Echo showed EF 60%, inferobasal hypokinesis, indeterminate diastolic function  - nitro drip weaned; pt denying any continued angina  - cardiac cath procedure on 3/23 and 3/27 with stent placements ; per cardiology poor CABG candidate   - continued concern for pt's tolerance/bleeding risk, but anticoagulation  "necessary give recent stent placements; cardiology following   - noted; management per hospital primary, and Cardiology     Oncology  Squamous cell cancer of tongue  - Diagnosed 12/15/21 via FNA; Underwent total glossectomy, bilateral neck dissection, bilateral cervical facial flaps and anterolateral thigh flap reconstruction of glossectomy defect; completed adjuvant chemoradiation  - Followed by oncology and ENT outpt. No longer on chemo or radiation.   - has a trach since above treatment   - noted; management per hospital primary     GI  PEG (percutaneous endoscopic gastrostomy) status  - PEG for feeding and meds; tube feeds typically well tolerated and no difficulty with home management by wife   - wife concerned of condition of PEG tube; original PEG tube in place; wife requesting GI consult to discuss while inpatient incase exchange is needed   - noted; management per hospital primary     I will follow-up with patient. Please contact us if you have any additional questions.     Total visit time: 55 minutes    > 50% of  35 min visit spent in chart review, face to face discussion of symptom assessment, coordination of care with other specialists, documentation, and discharge planning.    20 min ACP time spent: goals of care, emotional support, formulating and communicating prognosis, exploring burden/ benefit of various approaches of treatment.     Trupti Beck NP  Palliative Medicine  [facility] - Intensive Care    Subjective:     Chief Complaint:   Chief Complaint   Patient presents with    Hemoptysis     Ems called to 72yo male that wife noticed was having blood y sputum in his trach on Wednesday. Went thursdsay to have a trach change and the dr was unale to scope his mouth above the trach due to him gagging but did change the trach. Continued to have the pink sputum until 0300 today and it had bright red blood from trach. Denied any pain or SOB     HPI:   From H&P: " Fareed Richard Jr. 73 y.o. male " "with history of squamous cell cancer of tongue S/P trache no longer on chemotherapy, CAD, anemia presents to the hospital with a chief complaint of hemoptysis.  Per his wife 4 days ago he began to have pink tinged sputum via his trach.  He was seen by his ENT 2 days ago with a scope exam and a trach exchange without evidence of bleeding.  This morning at 3:00 a.m. he developed bright red blood via trach which resolved without intervention.  It is since not recurred.  He takes a daily aspirin.  He receives all medications via G-tube.  His tube feeding regimen consists of 6 cans of Isosource daily with 120 cc free water boluses.  He denies fever chest pain shortness of breath nausea vomiting abdominal pain leg swelling syncope dizziness dysuria melena hematuria hematemesis.     In the ED, hemoglobin 7.6 INR 1.0 BUN 50 creatinine 0.7  troponin negative BRCA negative O positive type and screen chest x-ray without detrimental change hypotensive to 85/54."     Palliative medicine consulted for goals of care and advance care planning; Met with pt at bedside; for details of visit, see advance care planning section of plan.       Hospital Course:  No notes on file    Medications:  Continuous Infusions:   NORepinephrine bitartrate-D5W Stopped (04/12/23 0000)     Scheduled Meds:   acetylcysteine 100 mg/ml (10%)  4 mL Nebulization TID WAKE    albuterol-ipratropium  3 mL Nebulization Q4H    aspirin  81 mg Oral Daily    atorvastatin  80 mg Per G Tube Daily    ceFEPime (MAXIPIME) IVPB  2 g Intravenous Q8H    chlorhexidine  15 mL Mouth/Throat BID    clopidogreL  75 mg Oral Daily    famotidine (PF)  20 mg Intravenous BID    ferrous sulfate  1 tablet Per G Tube Daily    glycopyrrolate  1 mg Per G Tube TID    LIDOcaine  2 patch Transdermal Q24H    melatonin  9 mg Per G Tube Nightly    metoprolol tartrate  25 mg Per G Tube BID    sodium chloride 0.9%  10 mL Intravenous Q6H    sulfamethoxazole-trimethoprim " 800-160mg  1 tablet Per G Tube BID    vancomycin (VANCOCIN) IVPB  750 mg Intravenous Q12H     PRN Meds:acetaminophen, albuterol-ipratropium, atropine, bisacodyL, [] fentaNYL **FOLLOWED BY** fentaNYL, guaiFENesin 100 mg/5 ml, hydrOXYzine HCL, insulin aspart U-100, loperamide, naloxone, nitroGLYCERIN, ondansetron, oxyCODONE, phenyleph-min oil-petrolatum, Flushing PICC Protocol **AND** sodium chloride 0.9% **AND** sodium chloride 0.9%, Pharmacy to dose Vancomycin consult **AND** vancomycin - pharmacy to dose    Objective:     Vital Signs (Most Recent):  Temp: 98.4 °F (36.9 °C) (23 0330)  Pulse: 88 (23 0930)  Resp: (!) 23 (23 0930)  BP: 120/77 (23 0600)  SpO2: 100 % (23 0930)   Vital Signs (24h Range):  Temp:  [98.1 °F (36.7 °C)-98.4 °F (36.9 °C)] 98.4 °F (36.9 °C)  Pulse:  [] 88  Resp:  [20-31.9] 23  SpO2:  [86 %-100 %] 100 %  BP: ()/() 120/77     Weight: 66.6 kg (146 lb 13.2 oz)  Body mass index is 22.32 kg/m².    Physical Exam  Vitals and nursing note reviewed.   Constitutional:       General: He is not in acute distress.     Appearance: He is ill-appearing.      Comments: Lying in bed with eyes closed; did not respond to name   HENT:      Head:      Comments: Temporal wasting  Cardiovascular:      Rate and Rhythm: Normal rate and regular rhythm.   Pulmonary:      Effort: No respiratory distress.      Comments: Ventilator to trach   Abdominal:      Comments: PEG    Musculoskeletal:      Right lower leg: No edema.      Left lower leg: No edema.      Comments: Muscular wasting    Neurological:      Mental Status: He is alert. He is disoriented.      Comments: Not responding to name or commands; twitching/non-purposeful movements of upper extremities (sometimes correlated to noises in rooms)      Advance Care Planning   Advance Directives:   Living Will: No    LaPOST: No    Do Not Resuscitate Status: No    Medical Power of : No    Goals of Care: What is  most important right now is to focus on spending time at home, remaining as independent as possible, symptom/pain control, curative/life-prolongation (regardless of treatment burdens). Accordingly, we have decided that the best plan to meet the patient's goals includes continuing with treatment.     Significant Labs: All pertinent labs within the past 24 hours have been reviewed.  CBC:   Recent Labs   Lab 04/12/23 0318   WBC 5.82   HGB 8.4*   HCT 28.0*   MCV 99*          BMP:  Recent Labs   Lab 04/12/23 0318   *      K 4.3      CO2 32*   BUN 16   CREATININE 0.8   CALCIUM 8.4*       LFT:  Lab Results   Component Value Date    AST 23 04/12/2023    ALKPHOS 99 04/12/2023    BILITOT 0.4 04/12/2023     Albumin:   Albumin   Date Value Ref Range Status   04/12/2023 2.0 (L) 3.5 - 5.2 g/dL Final     Protein:   Total Protein   Date Value Ref Range Status   04/12/2023 5.4 (L) 6.0 - 8.4 g/dL Final     Lactic acid:   Lab Results   Component Value Date    LACTATE 0.5 04/11/2023    LACTATE 1.8 03/19/2023     Significant Imaging: I have reviewed all pertinent imaging results/findings within the past 24 hours.

## 2023-04-12 NOTE — PROGRESS NOTES
"[facility] Intensive Care  Critical Care Medicine  Progress Note    Patient Name: Fareed Richard Jr.  MRN: [id number]  Admission Date: 3/18/2023  Hospital Length of Stay: 24 days  Code Status: Full Code  Attending Provider: Caprice Nava MD  Primary Care Provider: Kodi Tubbs MD   Principal Problem: Acute on chronic respiratory failure with hypoxia and hypercapnia    Subjective:     HPI:  Asked to evaluate for "hypotension and bleeding from trach site (replaced a few days ago)".    He was seen in ENT clinic by Dr. Smalls on 3/16 for routinely scheduled follow up and trach change.  He was having some thick yellow secretions with perhaps a hint of pink from the trach at time even before the trach exchange.  Subsequently, he was noted to have increase in congestion with more bloody-looking sputum.  He denies fever/chills/night sweats but has been more congested with dyspnea noted.  The day of presentation to the ED (3/18), secretions were frankly bloody => so he came to the ED.  He denies prior similar episodes.  He is on chronic aspirin and Plavix due to severe vascular disease with past stenting.    Review of past chest CT from October 2022 shows severe peripheral bullous changes, as well as centrilobular emphysema changes.  There is a modest left pleural effusion and left upper lobe nodular opacity.  There is bronchiectasis and diffuse micronodular changes in a tree-in-bud configuration to suggest chronic small airway inflammation/infection.  Chest CT at this time shows similar findings but now shows increased bibasilar air-space disease with consolidation/air bronchograms consistent with pneumonia.  Procalcitonin is normal.  Troponin has bumped up since admission to suggest possible demand ischemia in the setting of worsening anemia and hypotension.        Patient previously seen in Pulmonary Clinic by Dr. Wadsworth in October 2022.  From Dr. Wadsworth's note:    Fareed Richard Jr. is a 72 y.o. male who presents for " evaluation of chronic hypoxemic respiratory failure. He coughs daily, particularly in the evening. The cough is productive of yellow mucus which he expectorates without difficult via his trach. He did not experience chronic cough prior to his trach. He experiences chronic dyspnea on exertion. He takes wixela inhaler 1 puff BID (still inhales through his mouth) & an albuterol nebulizer. He has been taking prednisone for the past six weeks (for copd?). He takes glycopyrrolate due to constant oral secretions.      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 S/p tracheostomy, neck dissection, & total glossectomy January 2022.     HeSince his last visit he was hospitalized for acute respiratory failure requiring intubation and had an extended hospital course; admitted 4/19/22 and discharged 5/12/22.  3 month post-treatment PET CT reviewed; no evidence of local residual or recurrent disease.  Pulmonary findings likely reflect sequelae of recent hospitalization.  Concern for active inflammation going on; agree with starting steroids and getting PFTs.  Will need repeat CT chest in 8 weeks and follow up with pulmonology.     PMH: CAD status post RCA PCI in 2000, carotid stenosis status post carotid enterectomy in 2018, PAD status post bilateral femoral atherectomy and hypertension.     He is a former 2PPD smoker; he quit six years ago.        Hospital/ICU Course:  No notes on file    Interval History: Mr. Richard is awake and alert today, following all commands. He Is having more difficulty breathing and needs the pressure support of the ventilator.     Objective:     Vital Signs (Most Recent):  Temp: 98.4 °F (36.9 °C) (04/12/23 0330)  Pulse: 87 (04/12/23 1256)  Resp: (!) 27 (04/12/23 1256)  BP: 120/77 (04/12/23 0600)  SpO2: (!) 92 % (04/12/23 1256)   Vital Signs (24h Range):  Temp:  [98.1 °F (36.7 °C)-98.4 °F (36.9 °C)] 98.4 °F (36.9 °C)  Pulse:  [] 87  Resp:  [22-31.9] 27  SpO2:   [92 %-100 %] 92 %  BP: ()/() 120/77     Weight: 66.6 kg (146 lb 13.2 oz)  Body mass index is 22.32 kg/m².      Intake/Output Summary (Last 24 hours) at 4/12/2023 1303  Last data filed at 4/12/2023 0700  Gross per 24 hour   Intake 1986.59 ml   Output --   Net 1986.59 ml       Physical Exam  Vitals reviewed.   Constitutional:       Appearance: He is ill-appearing (chronically ill). He is not toxic-appearing.   HENT:      Head: Normocephalic and atraumatic.      Nose: Nose normal. No congestion.      Mouth/Throat:      Mouth: Mucous membranes are moist.      Pharynx: Oropharynx is clear. No oropharyngeal exudate.   Eyes:      Extraocular Movements: Extraocular movements intact.      Conjunctiva/sclera: Conjunctivae normal.      Pupils: Pupils are equal, round, and reactive to light.   Neck:      Comments: Cuffed 8 trach   Cardiovascular:      Rate and Rhythm: Normal rate and regular rhythm.      Pulses: Normal pulses.      Heart sounds: No murmur heard.  Pulmonary:      Effort: Tachypnea present. No respiratory distress.      Breath sounds: Rhonchi present. No wheezing.      Comments: Increased respiratory effort when PS is weaned.   Abdominal:      General: Abdomen is flat. Bowel sounds are normal.      Palpations: Abdomen is soft.   Neurological:      Comments: Not responding to commands or voice      Review of Systems    Vents:  Vent Mode: (S) PS/CPAP (04/12/23 0930)  Ventilator Initiated: Yes (04/11/23 0936)  Set Rate: 24 BPM (04/12/23 0900)  Vt Set: 400 mL (04/12/23 0900)  Pressure Support: 10 cmH20 (04/12/23 0930)  PEEP/CPAP: 5 cmH20 (04/12/23 0930)  Oxygen Concentration (%): (P) 40 (04/12/23 1256)  Peak Airway Pressure: 16.3 cmH20 (04/12/23 0930)  Total Ve: 13.14 L/m (04/12/23 0930)  F/VT Ratio<105 (RSBI): (!) 33.55 (04/12/23 0930)    Lines/Drains/Airways       Peripherally Inserted Central Catheter Line  Duration             PICC Triple Lumen 03/20/23 3877 right basilic 22 days              Drain   Duration                  Gastrostomy/Enterostomy 01/04/22 Percutaneous endoscopic gastrostomy (PEG)  days              Airway  Duration             Adult Surgical Airway 03/16/23 1014 Shiley Cuffed 6.0 27 days                    Significant Labs:    CBC/Anemia Profile:  Recent Labs   Lab 04/11/23  0843 04/12/23  0318   WBC 6.76 5.82   HGB 9.4* 8.4*   HCT 31.2* 28.0*    214   * 99*   RDW 18.0* 17.8*        Chemistries:  Recent Labs   Lab 04/11/23  0843 04/12/23  0318    136   K 4.2 4.3    101   CO2 35* 32*   BUN 15 16   CREATININE 0.8 0.8   CALCIUM 8.7 8.4*   ALBUMIN 2.3* 2.0*   PROT 5.7* 5.4*   BILITOT 0.5 0.4   ALKPHOS 112 99   ALT 22 17   AST 29 23       All pertinent labs within the past 24 hours have been reviewed.    Significant Imaging:  I have reviewed all pertinent imaging results/findings within the past 24 hours.      ABG  Recent Labs   Lab 04/11/23  1030   PH 7.224*   PO2 56   PCO2 98.2*   HCO3 40.6*   BE 10     Assessment/Plan:     ENT  Tracheostomy present  Tracheostomy in place since surgical resection for head/neck cancer in January 2022.  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    No signs of ongoing bleeding at the moment     Pulmonary  * Acute on chronic respiratory failure with hypoxia and hypercapnia  Vent Mode: PS/CPAP  Oxygen Concentration (%):  [35-40] (P) 40  Resp Rate Total:  [26 br/min] 26 br/min  Vt Set:  [400 mL] 400 mL  PEEP/CPAP:  [5 cmH20] 5 cmH20  Pressure Support:  [10 cmH20] 10 cmH20  Mean Airway Pressure:  [8.5 saD28-69.4 cmH20] 8.5 cmH20  Placed back on the ventilator for hypercapnea  - hypoxia and hypercapnea - weaning O2 for goal sat of 88% and above  - COPD treatment as above  - sputum culture- no clear signs of infection on CXR  - improved but still with some diffiuclty breathing and needs the support of the ventilator     Hemoptysis  Likely secondary to  lower respiratory tract infection in the setting of ASA/Plavix.  It seems less likely to be physical complication of the tracheostomy tube itself.    · Antibiotics for pneumonia.  · Check sputum for culture, including AFB for possible MAC infection.  · Respiratory viral panel.  · OK to resume anti-platelet medications; monitor for bleeding   · No need for additional nebulized TXA.  No bleeding since 3/19  · Appreciate ENT evaluation     COPD exacerbation  Continue duonebs but not suspecting this as the primary  at this time.  - thick secretions have been an issue, adding cpt and mucomyst nebs to assist with thinning secretions for clearance.   - follow up sputum culture    Cardiac/Vascular  Shock, unspecified  Bp improved today         Critical Care Time: 45  minutes  Critical secondary to Patient has a condition that poses threat to life and bodily function: Severe Respiratory Distress      Critical care was time spent personally by me on the following activities: development of treatment plan with patient or surrogate and bedside caregivers, discussions with consultants, evaluation of patient's response to treatment, examination of patient, ordering and performing treatments and interventions, ordering and review of laboratory studies, ordering and review of radiographic studies, pulse oximetry, re-evaluation of patient's condition. This critical care time did not overlap with that of any other provider or involve time for any procedures.     Alessandra Meza MD  Critical Care Medicine  [facility]

## 2023-04-12 NOTE — PROGRESS NOTES
Follow Up    [phone], Left message.    [phone], Left message.

## 2023-04-12 NOTE — SUBJECTIVE & OBJECTIVE
Interval History: Mr. Richard is awake and alert today, following all commands. He Is having more difficulty breathing and needs the pressure support of the ventilator.     Objective:     Vital Signs (Most Recent):  Temp: 98.4 °F (36.9 °C) (04/12/23 0330)  Pulse: 87 (04/12/23 1256)  Resp: (!) 27 (04/12/23 1256)  BP: 120/77 (04/12/23 0600)  SpO2: (!) 92 % (04/12/23 1256)   Vital Signs (24h Range):  Temp:  [98.1 °F (36.7 °C)-98.4 °F (36.9 °C)] 98.4 °F (36.9 °C)  Pulse:  [] 87  Resp:  [22-31.9] 27  SpO2:  [92 %-100 %] 92 %  BP: ()/() 120/77     Weight: 66.6 kg (146 lb 13.2 oz)  Body mass index is 22.32 kg/m².      Intake/Output Summary (Last 24 hours) at 4/12/2023 1303  Last data filed at 4/12/2023 0700  Gross per 24 hour   Intake 1986.59 ml   Output --   Net 1986.59 ml       Physical Exam  Vitals reviewed.   Constitutional:       Appearance: He is ill-appearing (chronically ill). He is not toxic-appearing.   HENT:      Head: Normocephalic and atraumatic.      Nose: Nose normal. No congestion.      Mouth/Throat:      Mouth: Mucous membranes are moist.      Pharynx: Oropharynx is clear. No oropharyngeal exudate.   Eyes:      Extraocular Movements: Extraocular movements intact.      Conjunctiva/sclera: Conjunctivae normal.      Pupils: Pupils are equal, round, and reactive to light.   Neck:      Comments: Cuffed 8 trach   Cardiovascular:      Rate and Rhythm: Normal rate and regular rhythm.      Pulses: Normal pulses.      Heart sounds: No murmur heard.  Pulmonary:      Effort: Tachypnea present. No respiratory distress.      Breath sounds: Rhonchi present. No wheezing.      Comments: Increased respiratory effort when PS is weaned.   Abdominal:      General: Abdomen is flat. Bowel sounds are normal.      Palpations: Abdomen is soft.   Neurological:      Comments: Not responding to commands or voice      Review of Systems    Vents:  Vent Mode: (S) PS/CPAP (04/12/23 2250)  Ventilator Initiated: Yes  (04/11/23 0936)  Set Rate: 24 BPM (04/12/23 0900)  Vt Set: 400 mL (04/12/23 0900)  Pressure Support: 10 cmH20 (04/12/23 0930)  PEEP/CPAP: 5 cmH20 (04/12/23 0930)  Oxygen Concentration (%): (P) 40 (04/12/23 1256)  Peak Airway Pressure: 16.3 cmH20 (04/12/23 0930)  Total Ve: 13.14 L/m (04/12/23 0930)  F/VT Ratio<105 (RSBI): (!) 33.55 (04/12/23 0930)    Lines/Drains/Airways       Peripherally Inserted Central Catheter Line  Duration             PICC Triple Lumen 03/20/23 2145 right basilic 22 days              Drain  Duration                  Gastrostomy/Enterostomy 01/04/22 Percutaneous endoscopic gastrostomy (PEG)  days              Airway  Duration             Adult Surgical Airway 03/16/23 1014 Shiley Cuffed 6.0 27 days                    Significant Labs:    CBC/Anemia Profile:  Recent Labs   Lab 04/11/23  0843 04/12/23 0318   WBC 6.76 5.82   HGB 9.4* 8.4*   HCT 31.2* 28.0*    214   * 99*   RDW 18.0* 17.8*        Chemistries:  Recent Labs   Lab 04/11/23  0843 04/12/23 0318    136   K 4.2 4.3    101   CO2 35* 32*   BUN 15 16   CREATININE 0.8 0.8   CALCIUM 8.7 8.4*   ALBUMIN 2.3* 2.0*   PROT 5.7* 5.4*   BILITOT 0.5 0.4   ALKPHOS 112 99   ALT 22 17   AST 29 23       All pertinent labs within the past 24 hours have been reviewed.    Significant Imaging:  I have reviewed all pertinent imaging results/findings within the past 24 hours.

## 2023-04-12 NOTE — SUBJECTIVE & OBJECTIVE
Interval History:On vent, awake and alert. Mouths/writes that he does not remember yesterday.     Review of Systems   Unable to perform ROS: Intubated   Objective:     Vital Signs (Most Recent):  Temp: 98.4 °F (36.9 °C) (04/12/23 0330)  Pulse: 88 (04/12/23 0900)  Resp: (!) 31.9 (04/12/23 0900)  BP: 120/77 (04/12/23 0600)  SpO2: 100 % (04/12/23 0900)   Vital Signs (24h Range):  Temp:  [98.1 °F (36.7 °C)-98.4 °F (36.9 °C)] 98.4 °F (36.9 °C)  Pulse:  [] 88  Resp:  [18.1-31.9] 31.9  SpO2:  [86 %-100 %] 100 %  BP: ()/() 120/77     Weight: 66.6 kg (146 lb 13.2 oz)  Body mass index is 22.32 kg/m².    Intake/Output Summary (Last 24 hours) at 4/12/2023 0930  Last data filed at 4/12/2023 0700  Gross per 24 hour   Intake 2026.59 ml   Output 250 ml   Net 1776.59 ml        Physical Exam  Vitals and nursing note reviewed.   Constitutional:       General: He is not in acute distress.     Appearance: He is ill-appearing. He is not toxic-appearing.   HENT:      Head: Normocephalic and atraumatic.      Comments: Temporal wasting     Nose: Nose normal.      Mouth/Throat:      Comments: Edentulous.   Neck:      Comments: Trach in place, attached to vent  Cardiovascular:      Rate and Rhythm: Normal rate and regular rhythm.   Pulmonary:      Effort: No respiratory distress.      Breath sounds: No wheezing, rhonchi or rales.      Comments: On vent, diminished bilaterally  Abdominal:      General: Bowel sounds are normal. There is distension.      Palpations: Abdomen is soft.      Comments: PEG in place   Musculoskeletal:      Right lower leg: No edema.      Left lower leg: No edema.      Comments: Muscular wasting   Skin:     General: Skin is warm and dry.   Neurological:      Mental Status: He is disoriented.      Comments: Awake and alert, following commands, writes to communicate       Significant Labs: All pertinent labs within the past 24 hours have been reviewed.    Significant Imaging: I have reviewed all  pertinent imaging results/findings within the past 24 hours.

## 2023-04-12 NOTE — PROGRESS NOTES
[facility] Medicine  Progress Note    Patient Name: Fareed Richard Jr.  MRN: [id number]  Patient Class: IP- Inpatient   Admission Date: 3/18/2023  Length of Stay: 24 days  Attending Physician: Caprice Nava MD  Primary Care Provider: Kodi Tubbs MD        Subjective:     Principal Problem:Acute on chronic respiratory failure with hypoxia and hypercapnia        HPI:  Fareed Richard Jr. 73 y.o. 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  Per his wife 4 days ago he began to have pink tinged sputum via his trach.  He was seen by his ENT 2 days ago with a scope exam and a trach exchange without evidence of bleeding.  This morning at 3:00 a.m. he developed bright red blood via trach which resolved without intervention.  It is since not recurred.  He takes a daily aspirin.  He receives all medications via G-tube.  His tube feeding regimen consists of 6 cans of Isosource daily with 120 cc free water boluses.  He denies fever chest pain shortness of breath nausea vomiting abdominal pain leg swelling syncope dizziness dysuria melena hematuria hematemesis.    In the ED, hemoglobin 7.6 INR 1.0 BUN 50 creatinine 0.7  troponin negative BRCA negative O positive type and screen chest x-ray without detrimental change hypotensive to 85/54.      Overview/Hospital Course:  72 yo M w squamous CA of tongue s/p glossectomy and reconstructive flap with trach, CAD, chronic hypoxic respiratory failure (on 5L at home) and with peg presented for evaluation of hemoptysis 2 days after tracheostomy change in clinic. Transferred to ICU 3/19 when markedly hypotensive.  Unclear if due to hemorrhage, cardiogenic or septic shock.  Did require PRBC and TXA nebs but didn't seem like sufficient bleeding to cause shock.  Bleeding stopped and ENT following.  CTA shows either significant mucus  plugging or bibasilar pneumonia - pulmonology favored pneumonia.  Trach aspirate with Stenotrophomonas, Achromobacter, and Candida. Also urine culture growing Enterococcus.  He is on broad spectrum antibiotics including bactrim. Developed NSTEMI. Cardiology consulted. [facility] 3/23 which showed distal LM 30%, mild LAD 90% bifurcation lesion with D1, Cx mid 80%, RCA moderate diffuse disease with long 70% and some left to right collateral. All vessels heavily calcified. Not a candidate for CABG but plan for staged PCI. Continued on heparin drip, asa/plavix. Vasopressors weaned. [facility] 3/27 PCI to LAD and LCx. Post operatively he was hemodynamically unstable and anemic. Stat CTA showed RP hematoma near L kidney without acute bleeding. Pt transfused supportively. He improved over the next few days. Stepped down to floor. Remains on trach collar, higher than home O2 requirements. He has delirium as well and poor sleep. Attempted trazodone. The following morning, he was much more lethargic and was transferred to ICU for worsening acute on chronic hypoxic/hypercapnic respiratory failure. This improved after 1 day on the vent.       Interval History:On vent, awake and alert. Mouths/writes that he does not remember yesterday.     Review of Systems   Unable to perform ROS: Intubated   Objective:     Vital Signs (Most Recent):  Temp: 98.4 °F (36.9 °C) (04/12/23 0330)  Pulse: 88 (04/12/23 0900)  Resp: (!) 31.9 (04/12/23 0900)  BP: 120/77 (04/12/23 0600)  SpO2: 100 % (04/12/23 0900)   Vital Signs (24h Range):  Temp:  [98.1 °F (36.7 °C)-98.4 °F (36.9 °C)] 98.4 °F (36.9 °C)  Pulse:  [] 88  Resp:  [18.1-31.9] 31.9  SpO2:  [86 %-100 %] 100 %  BP: ()/() 120/77     Weight: 66.6 kg (146 lb 13.2 oz)  Body mass index is 22.32 kg/m².    Intake/Output Summary (Last 24 hours) at 4/12/2023 0930  Last data filed at 4/12/2023 0700  Gross per 24 hour   Intake 2026.59 ml   Output 250 ml   Net 1776.59 ml        Physical Exam  Vitals and  nursing note reviewed.   Constitutional:       General: He is not in acute distress.     Appearance: He is ill-appearing. He is not toxic-appearing.   HENT:      Head: Normocephalic and atraumatic.      Comments: Temporal wasting     Nose: Nose normal.      Mouth/Throat:      Comments: Edentulous.   Neck:      Comments: Trach in place, attached to vent  Cardiovascular:      Rate and Rhythm: Normal rate and regular rhythm.   Pulmonary:      Effort: No respiratory distress.      Breath sounds: No wheezing, rhonchi or rales.      Comments: On vent, diminished bilaterally  Abdominal:      General: Bowel sounds are normal. There is distension.      Palpations: Abdomen is soft.      Comments: PEG in place   Musculoskeletal:      Right lower leg: No edema.      Left lower leg: No edema.      Comments: Muscular wasting   Skin:     General: Skin is warm and dry.   Neurological:      Mental Status: He is disoriented.      Comments: Awake and alert, following commands, writes to communicate       Significant Labs: All pertinent labs within the past 24 hours have been reviewed.    Significant Imaging: I have reviewed all pertinent imaging results/findings within the past 24 hours.      Assessment/Plan:      * Acute on chronic respiratory failure with hypoxia and hypercapnia  At home is on 5L O2 via trach and O2 sats typically in high 80s low 90s. Worsening respiration this admission due to aspiration of blood and likely food aspiration. Required ventilator from 3/21 to 3/23, subsequently weaned. Treated empirically for pneumonia. Respiratory cultures show Stenotrophomonas which was not treated  - started bactrim to treat Stenotrophomonas  - worsening hypoxia and hypercapnia on 4/11. This could be related to trazodone use (attempted 4/10 PM for sleep)   - transferred to ICU and placed on ventilator   - treating potential pneumonia. Known Stenotrophomonas- treating with bactrim. Also on vanc/cefepime while awaiting further  "cultures   - does not appear that he has had another MI   - significantly improved on 4/12   - Pulmonary is following       Shock, unspecified  Developed 4/11 but responded to IVF bolus and did not require vasopressors. This resolved. No signs of bleeding, potential PNA being treated, does not appear to have new MI.       Retroperitoneal hematoma  RP hematoma discovered after LHC. See "acute blood loss anemia"      NSTEMI (non-ST elevated myocardial infarction)  See "coronary artery disease"      Anxiety  Anxiety waxes and wanes with clinical status. He also has delirium  - Stopped hydroxyzine as this could be contributing to delirium   - Prior bad reaction to ativan  - Attempted trazodone to help sleep and then had worsening encephalopathy and hypoxic/hypercapnic respiratory failure the next morning  - may try mirtazapine if this becomes an issue    Hemoptysis  -See acute blood loss anemia  -this has resolved     PEG (percutaneous endoscopic gastrostomy) status  Continue medications via PEG. Does not take oral intake.   -On isosource 1.5 6 days daily with 120cc free water flushes via wife  -Will continue home tube feedings, with nutrition consulted    Tracheostomy present  See respiratory failure     ACP (advance care planning)  Advance Care Planning     Date: 04/10/2023  Palliative consulted, reviewed documentation. Full code. Needs home palliative follow up upon discharge. Time spent reviewing on 4/10/23= 5 minutes          Severe protein-calorie malnutrition  Nutrition consulted. Most recent weight and BMI monitored-   Wt Readings from Last 3 Encounters:   04/07/23 0730 66.6 kg (146 lb 13.2 oz)   04/06/23 0715 65.7 kg (144 lb 13.5 oz)   04/05/23 0715 97.8 kg (215 lb 9.8 oz)   04/04/23 0715 97.3 kg (214 lb 8.1 oz)   04/03/23 0715 67 kg (147 lb 11.3 oz)   03/27/23 1324 68 kg (150 lb)   03/20/23 1130 68 kg (150 lb)   03/18/23 1747 68 kg (150 lb)   03/18/23 1028 68 kg (150 lb)   03/16/23 1600 65.8 kg (145 lb) "   01/12/23 1414 65.3 kg (143 lb 15.4 oz)     Body mass index is 22.32 kg/m².     Recommendations: Recommendation/Intervention: 1. Continue with TF recs; Monitor diet advancements. 2. Advance diet as tolerated and appropriate. 3. Monitor weight changes; check weekly weights.  Goals: 1. Diet advancement by RD follow up.    Patient has been screened and assessed by RD. RD will follow patient.      Secondary malignant neoplasm of cervical lymph node  Noted      Acute blood loss anemia  Pt presented with hemoptysis. Scopes prior to admit and by ENT during admit not showing clear source. Bleeding improved. Pt underwent LHC for NSTEMI. After that procedure he was found to have a large retroperitoneal hematoma. Since starting DAPT he has also had recurrent hemoptysis as well as melanic stools. Trach cuff reinflated 4/2 with improvement in bleeding.  - trend CBC, transfuse prn  - Hgb now stable  - continue asa, plavix     COPD exacerbation  This seems resolved; however, did develop worsening hypoxic/hypercapnic respiratory failure  - increase nebs      Other hyperlipidemia  -Continue home statin    Primary hypertension  Previously hypotensive, now normotensive  - continue metoprolol    Coronary artery disease involving native coronary artery of native heart with unstable angina pectoris  Troponin elevated when pt was upgraded to the ICU. At that time it was thought to be due to demand, however he had persistent chest pain concerning for ACS. Was started on heparin gtt and tolerated. Subsequently went to [facility] which showed diffuse disease. Pt not a candidate for CABG. He subsequently underwent PCI of LAD and LCx.  - Continue asa, plavix, statin, metoprolol      Squamous cell cancer of tongue  Dx 2021, now s/p total glossectomy, bilateral neck dissection, bilateral cervical facial flaps and anterolateral thigh flap reconstruction of glossectomy defect 1/4/22. Completed adjuvant chemoradiation. Had trach changed by ENT 2 days  prior to admit and scope at that time showed no signs of bleeding despite hemoptysis present.       Carotid stenosis  Continues on asa, plavix, statin       Peripheral vascular disease  Continues on asa, plavix, statin         VTE Risk Mitigation (From admission, onward)           Ordered     IP VTE LOW RISK PATIENT  Once         04/11/23 0909     Place sequential compression device  Until discontinued         03/18/23 1620     Place NIKITA hose  Until discontinued         03/18/23 1620                    Discharge Planning   NISA:      Code Status: Full Code   Is the patient medically ready for discharge?:     Reason for patient still in hospital (select all that apply): Patient trending condition  Discharge Plan A: Rehab   Discharge Delays: None known at this time    10:31 AM Called wife Bridgett Richard to update her.     Critical care time spent on the evaluation and treatment of severe organ dysfunction, review of pertinent labs and imaging studies, discussions with consulting providers and discussions with patient/family: 40 minutes.    Caprice Nava MD  Department of Hospital Medicine   [facility] [place] - Intensive Care

## 2023-04-12 NOTE — ASSESSMENT & PLAN NOTE
Vent Mode: PS/CPAP  Oxygen Concentration (%):  [35-40] (P) 40  Resp Rate Total:  [26 br/min] 26 br/min  Vt Set:  [400 mL] 400 mL  PEEP/CPAP:  [5 cmH20] 5 cmH20  Pressure Support:  [10 cmH20] 10 cmH20  Mean Airway Pressure:  [8.5 ptW72-96.4 cmH20] 8.5 cmH20  Placed back on the ventilator for hypercapnea  - hypoxia and hypercapnea - weaning O2 for goal sat of 88% and above  - COPD treatment as above  - sputum culture- no clear signs of infection on CXR  - improved but still with some diffiuclty breathing and needs the support of the ventilator

## 2023-04-13 PROBLEM — R57.9 SHOCK, UNSPECIFIED: Status: RESOLVED | Noted: 2023-04-11 | Resolved: 2023-04-13

## 2023-04-13 LAB
ALBUMIN SERPL BCP-MCNC: 2.1 G/DL (ref 3.5–5.2)
ALP SERPL-CCNC: 109 U/L (ref 55–135)
ALT SERPL W/O P-5'-P-CCNC: 25 U/L (ref 10–44)
ANION GAP SERPL CALC-SCNC: 4 MMOL/L (ref 8–16)
AST SERPL-CCNC: 34 U/L (ref 10–40)
BASOPHILS # BLD AUTO: 0.02 K/UL (ref 0–0.2)
BASOPHILS NFR BLD: 0.3 % (ref 0–1.9)
BILIRUB SERPL-MCNC: 0.5 MG/DL (ref 0.1–1)
BUN SERPL-MCNC: 17 MG/DL (ref 8–23)
CALCIUM SERPL-MCNC: 8.7 MG/DL (ref 8.7–10.5)
CHLORIDE SERPL-SCNC: 100 MMOL/L (ref 95–110)
CO2 SERPL-SCNC: 32 MMOL/L (ref 23–29)
CREAT SERPL-MCNC: 0.8 MG/DL (ref 0.5–1.4)
DIFFERENTIAL METHOD: ABNORMAL
EOSINOPHIL # BLD AUTO: 0.1 K/UL (ref 0–0.5)
EOSINOPHIL NFR BLD: 1.9 % (ref 0–8)
ERYTHROCYTE [DISTWIDTH] IN BLOOD BY AUTOMATED COUNT: 18.2 % (ref 11.5–14.5)
EST. GFR  (NO RACE VARIABLE): >60 ML/MIN/1.73 M^2
GLUCOSE SERPL-MCNC: 155 MG/DL (ref 70–110)
HCT VFR BLD AUTO: 29.3 % (ref 40–54)
HGB BLD-MCNC: 9 G/DL (ref 14–18)
IMM GRANULOCYTES # BLD AUTO: 0.03 K/UL (ref 0–0.04)
IMM GRANULOCYTES NFR BLD AUTO: 0.4 % (ref 0–0.5)
LYMPHOCYTES # BLD AUTO: 0.5 K/UL (ref 1–4.8)
LYMPHOCYTES NFR BLD: 6.6 % (ref 18–48)
MCH RBC QN AUTO: 30.3 PG (ref 27–31)
MCHC RBC AUTO-ENTMCNC: 30.7 G/DL (ref 32–36)
MCV RBC AUTO: 99 FL (ref 82–98)
MONOCYTES # BLD AUTO: 0.7 K/UL (ref 0.3–1)
MONOCYTES NFR BLD: 9.5 % (ref 4–15)
NEUTROPHILS # BLD AUTO: 5.9 K/UL (ref 1.8–7.7)
NEUTROPHILS NFR BLD: 81.3 % (ref 38–73)
NRBC BLD-RTO: 0 /100 WBC
PLATELET # BLD AUTO: 225 K/UL (ref 150–450)
PMV BLD AUTO: 10.3 FL (ref 9.2–12.9)
POCT GLUCOSE: 135 MG/DL (ref 70–110)
POCT GLUCOSE: 136 MG/DL (ref 70–110)
POCT GLUCOSE: 147 MG/DL (ref 70–110)
POCT GLUCOSE: 150 MG/DL (ref 70–110)
POTASSIUM SERPL-SCNC: 4.5 MMOL/L (ref 3.5–5.1)
PROT SERPL-MCNC: 5.8 G/DL (ref 6–8.4)
RBC # BLD AUTO: 2.97 M/UL (ref 4.6–6.2)
SODIUM SERPL-SCNC: 136 MMOL/L (ref 136–145)
VANCOMYCIN TROUGH SERPL-MCNC: 21.3 UG/ML (ref 10–22)
WBC # BLD AUTO: 7.25 K/UL (ref 3.9–12.7)

## 2023-04-13 PROCEDURE — 99291 CRITICAL CARE FIRST HOUR: CPT | Mod: ,,, | Performed by: INTERNAL MEDICINE

## 2023-04-13 PROCEDURE — 80053 COMPREHEN METABOLIC PANEL: CPT | Performed by: HOSPITALIST

## 2023-04-13 PROCEDURE — 63600175 PHARM REV CODE 636 W HCPCS: Performed by: HOSPITALIST

## 2023-04-13 PROCEDURE — 94761 N-INVAS EAR/PLS OXIMETRY MLT: CPT

## 2023-04-13 PROCEDURE — 25000003 PHARM REV CODE 250: Performed by: HOSPITALIST

## 2023-04-13 PROCEDURE — 85025 COMPLETE CBC W/AUTO DIFF WBC: CPT | Performed by: HOSPITALIST

## 2023-04-13 PROCEDURE — 94003 VENT MGMT INPAT SUBQ DAY: CPT

## 2023-04-13 PROCEDURE — 99497 ADVNCD CARE PLAN 30 MIN: CPT | Mod: ,,, | Performed by: REGISTERED NURSE

## 2023-04-13 PROCEDURE — 99291 PR CRITICAL CARE, E/M 30-74 MINUTES: ICD-10-PCS | Mod: ,,, | Performed by: INTERNAL MEDICINE

## 2023-04-13 PROCEDURE — 36415 COLL VENOUS BLD VENIPUNCTURE: CPT | Performed by: HOSPITALIST

## 2023-04-13 PROCEDURE — 20000000 HC ICU ROOM

## 2023-04-13 PROCEDURE — 99233 PR SUBSEQUENT HOSPITAL CARE,LEVL III: ICD-10-PCS | Mod: ,,, | Performed by: REGISTERED NURSE

## 2023-04-13 PROCEDURE — 99900026 HC AIRWAY MAINTENANCE (STAT)

## 2023-04-13 PROCEDURE — 25000242 PHARM REV CODE 250 ALT 637 W/ HCPCS: Performed by: INTERNAL MEDICINE

## 2023-04-13 PROCEDURE — 99233 SBSQ HOSP IP/OBS HIGH 50: CPT | Mod: ,,, | Performed by: REGISTERED NURSE

## 2023-04-13 PROCEDURE — 25000242 PHARM REV CODE 250 ALT 637 W/ HCPCS: Performed by: HOSPITALIST

## 2023-04-13 PROCEDURE — 94640 AIRWAY INHALATION TREATMENT: CPT

## 2023-04-13 PROCEDURE — 94668 MNPJ CHEST WALL SBSQ: CPT

## 2023-04-13 PROCEDURE — A4216 STERILE WATER/SALINE, 10 ML: HCPCS | Performed by: HOSPITALIST

## 2023-04-13 PROCEDURE — 99498 PR ADVNCD CARE PLAN ADDL 30 MIN: ICD-10-PCS | Mod: ,,, | Performed by: REGISTERED NURSE

## 2023-04-13 PROCEDURE — 99498 ADVNCD CARE PLAN ADDL 30 MIN: CPT | Mod: ,,, | Performed by: REGISTERED NURSE

## 2023-04-13 PROCEDURE — 99900035 HC TECH TIME PER 15 MIN (STAT)

## 2023-04-13 PROCEDURE — 99497 PR ADVNCD CARE PLAN 30 MIN: ICD-10-PCS | Mod: ,,, | Performed by: REGISTERED NURSE

## 2023-04-13 RX ADMIN — CLOPIDOGREL BISULFATE 75 MG: 75 TABLET ORAL at 08:04

## 2023-04-13 RX ADMIN — CHLORHEXIDINE GLUCONATE 0.12% ORAL RINSE 15 ML: 1.2 LIQUID ORAL at 08:04

## 2023-04-13 RX ADMIN — IPRATROPIUM BROMIDE AND ALBUTEROL SULFATE 3 ML: 2.5; .5 SOLUTION RESPIRATORY (INHALATION) at 03:04

## 2023-04-13 RX ADMIN — ACETYLCYSTEINE 4 ML: 100 SOLUTION ORAL; RESPIRATORY (INHALATION) at 12:04

## 2023-04-13 RX ADMIN — IPRATROPIUM BROMIDE AND ALBUTEROL SULFATE 3 ML: 2.5; .5 SOLUTION RESPIRATORY (INHALATION) at 12:04

## 2023-04-13 RX ADMIN — SENNOSIDES AND DOCUSATE SODIUM 2 TABLET: 50; 8.6 TABLET ORAL at 08:04

## 2023-04-13 RX ADMIN — ACETYLCYSTEINE 4 ML: 100 SOLUTION ORAL; RESPIRATORY (INHALATION) at 08:04

## 2023-04-13 RX ADMIN — IPRATROPIUM BROMIDE AND ALBUTEROL SULFATE 3 ML: 2.5; .5 SOLUTION RESPIRATORY (INHALATION) at 07:04

## 2023-04-13 RX ADMIN — ASPIRIN 81 MG CHEWABLE TABLET 81 MG: 81 TABLET CHEWABLE at 08:04

## 2023-04-13 RX ADMIN — SULFAMETHOXAZOLE AND TRIMETHOPRIM 1 TABLET: 800; 160 TABLET ORAL at 08:04

## 2023-04-13 RX ADMIN — FERROUS SULFATE TAB 325 MG (65 MG ELEMENTAL FE) 1 EACH: 325 (65 FE) TAB at 08:04

## 2023-04-13 RX ADMIN — METOPROLOL TARTRATE 25 MG: 25 TABLET, FILM COATED ORAL at 08:04

## 2023-04-13 RX ADMIN — CEFEPIME HYDROCHLORIDE 2 G: 2 INJECTION, SOLUTION INTRAVENOUS at 01:04

## 2023-04-13 RX ADMIN — OXYCODONE HYDROCHLORIDE 5 MG: 5 TABLET ORAL at 08:04

## 2023-04-13 RX ADMIN — CEFEPIME HYDROCHLORIDE 2 G: 2 INJECTION, SOLUTION INTRAVENOUS at 08:04

## 2023-04-13 RX ADMIN — IPRATROPIUM BROMIDE AND ALBUTEROL SULFATE 3 ML: 2.5; .5 SOLUTION RESPIRATORY (INHALATION) at 04:04

## 2023-04-13 RX ADMIN — FAMOTIDINE 20 MG: 10 INJECTION, SOLUTION INTRAVENOUS at 08:04

## 2023-04-13 RX ADMIN — CEFEPIME HYDROCHLORIDE 2 G: 2 INJECTION, SOLUTION INTRAVENOUS at 05:04

## 2023-04-13 RX ADMIN — Medication 10 ML: at 12:04

## 2023-04-13 RX ADMIN — Medication 10 ML: at 05:04

## 2023-04-13 RX ADMIN — ATORVASTATIN CALCIUM 80 MG: 40 TABLET, FILM COATED ORAL at 08:04

## 2023-04-13 RX ADMIN — ACETYLCYSTEINE 4 ML: 100 SOLUTION ORAL; RESPIRATORY (INHALATION) at 07:04

## 2023-04-13 RX ADMIN — MELATONIN TAB 3 MG 9 MG: 3 TAB at 08:04

## 2023-04-13 RX ADMIN — GUAIFENESIN 400 MG: 200 SOLUTION ORAL at 08:04

## 2023-04-13 RX ADMIN — IPRATROPIUM BROMIDE AND ALBUTEROL SULFATE 3 ML: 2.5; .5 SOLUTION RESPIRATORY (INHALATION) at 08:04

## 2023-04-13 NOTE — PROGRESS NOTES
[facility] Medicine  Progress Note    Patient Name: Fareed Richard Jr.  MRN: [id number]  Patient Class: IP- Inpatient   Admission Date: 3/18/2023  Length of Stay: 25 days  Attending Physician: Caprice Nava MD  Primary Care Provider: Kodi Tubbs MD        Subjective:     Principal Problem:Acute on chronic respiratory failure with hypoxia and hypercapnia        HPI:  Fareed Richard Jr. 73 y.o. 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  Per his wife 4 days ago he began to have pink tinged sputum via his trach.  He was seen by his ENT 2 days ago with a scope exam and a trach exchange without evidence of bleeding.  This morning at 3:00 a.m. he developed bright red blood via trach which resolved without intervention.  It is since not recurred.  He takes a daily aspirin.  He receives all medications via G-tube.  His tube feeding regimen consists of 6 cans of Isosource daily with 120 cc free water boluses.  He denies fever chest pain shortness of breath nausea vomiting abdominal pain leg swelling syncope dizziness dysuria melena hematuria hematemesis.    In the ED, hemoglobin 7.6 INR 1.0 BUN 50 creatinine 0.7  troponin negative BRCA negative O positive type and screen chest x-ray without detrimental change hypotensive to 85/54.      Overview/Hospital Course:  72 yo M w squamous CA of tongue s/p glossectomy and reconstructive flap with trach, CAD, chronic hypoxic respiratory failure (on 5L at home) and with peg presented for evaluation of hemoptysis 2 days after tracheostomy change in clinic. Transferred to ICU 3/19 when markedly hypotensive.  Unclear if due to hemorrhage, cardiogenic or septic shock.  Did require PRBC and TXA nebs but didn't seem like sufficient bleeding to cause shock.  Bleeding stopped and ENT following.  CTA shows either significant mucus  plugging or bibasilar pneumonia - pulmonology favored pneumonia.  Trach aspirate with Stenotrophomonas, Achromobacter, and Candida. Also urine culture growing Enterococcus.  He is on broad spectrum antibiotics including bactrim. Developed NSTEMI. Cardiology consulted. [facility] 3/23 which showed distal LM 30%, mild LAD 90% bifurcation lesion with D1, Cx mid 80%, RCA moderate diffuse disease with long 70% and some left to right collateral. All vessels heavily calcified. Not a candidate for CABG but plan for staged PCI. Continued on heparin drip, asa/plavix. Vasopressors weaned. [facility] 3/27 PCI to LAD and LCx. Post operatively he was hemodynamically unstable and anemic. Stat CTA showed RP hematoma near L kidney without acute bleeding. Pt transfused supportively. He improved over the next few days. Stepped down to floor. Remains on trach collar, higher than home O2 requirements. He has delirium as well and poor sleep. Attempted trazodone. The following morning, he was much more lethargic and was transferred to ICU for worsening acute on chronic hypoxic/hypercapnic respiratory failure. This improved after 1 day on the vent. Trach aspirate with GNR; remains on cefepime in addition to bactrim for prior Stenotrophomonas.       Interval History:On vent, awake and alert. No complaints.     Review of Systems   Unable to perform ROS: Intubated   Objective:     Vital Signs (Most Recent):  Temp: 97.7 °F (36.5 °C) (04/13/23 0800)  Pulse: 90 (04/13/23 1100)  Resp: 19.3 (04/13/23 1100)  BP: 113/62 (04/13/23 1100)  SpO2: 98 % (04/13/23 1100)   Vital Signs (24h Range):  Temp:  [97.7 °F (36.5 °C)-98.5 °F (36.9 °C)] 97.7 °F (36.5 °C)  Pulse:  [82-99] 90  Resp:  [17-35] 19.3  SpO2:  [88 %-100 %] 98 %  BP: (105-142)/(62-86) 113/62     Weight: 66.6 kg (146 lb 13.2 oz)  Body mass index is 22.32 kg/m².    Intake/Output Summary (Last 24 hours) at 4/13/2023 1210  Last data filed at 4/13/2023 1144  Gross per 24 hour   Intake 1775.58 ml   Output 1550 ml    Net 225.58 ml        Physical Exam  Vitals and nursing note reviewed.   Constitutional:       General: He is not in acute distress.     Appearance: He is ill-appearing. He is not toxic-appearing.   HENT:      Head: Normocephalic and atraumatic.      Comments: Temporal wasting     Nose: Nose normal.      Mouth/Throat:      Comments: Edentulous.   Neck:      Comments: Trach in place, attached to vent  Cardiovascular:      Rate and Rhythm: Normal rate and regular rhythm.   Pulmonary:      Effort: No respiratory distress.      Breath sounds: No wheezing, rhonchi or rales.      Comments: On vent, diminished bilaterally  Abdominal:      General: Bowel sounds are normal. There is distension.      Palpations: Abdomen is soft.      Comments: PEG in place   Musculoskeletal:      Right lower leg: No edema.      Left lower leg: No edema.      Comments: Muscular wasting   Skin:     General: Skin is warm and dry.   Neurological:      Mental Status: He is disoriented.      Comments: Awake and alert, following commands       Significant Labs: All pertinent labs within the past 24 hours have been reviewed.    Significant Imaging: I have reviewed all pertinent imaging results/findings within the past 24 hours.      Assessment/Plan:      * Acute on chronic respiratory failure with hypoxia and hypercapnia  At home is on 5L O2 via trach and O2 sats typically in high 80s low 90s. Worsening respiration this admission due to aspiration of blood and likely food aspiration. Required ventilator from 3/21 to 3/23, subsequently weaned. Treated empirically for pneumonia. Respiratory cultures show Stenotrophomonas which was not treated  - started bactrim to treat Stenotrophomonas  - worsening hypoxia and hypercapnia on 4/11. This could be related to trazodone use (attempted 4/10 PM for sleep)   - transferred to ICU and placed on ventilator   - treating potential pneumonia. Known Stenotrophomonas- treating with bactrim. Trach aspirate with GNR-  "on cefepime while awaiting speciation    - does not appear that he has had another MI   - significantly improved    - Pulmonary is following       Retroperitoneal hematoma  RP hematoma discovered after LHC. See "acute blood loss anemia"      NSTEMI (non-ST elevated myocardial infarction)  See "coronary artery disease"      Anxiety  Anxiety waxes and wanes with clinical status. He also has delirium  - Stopped hydroxyzine as this could be contributing to delirium   - Prior bad reaction to ativan  - Attempted trazodone to help sleep and then had worsening encephalopathy and hypoxic/hypercapnic respiratory failure the next morning  - may try mirtazapine if this becomes an issue    Hemoptysis  -See acute blood loss anemia  -this has resolved     Pneumonia  3/22 Respiratory culture: Stenotrophomonas, Achromobacter--> on bactrim x14 days for this  4/11 Respiratory culture: GNR--> on cefepime while awaiting speciation       PEG (percutaneous endoscopic gastrostomy) status  Continue medications via PEG. Does not take oral intake.   -On isosource 1.5 6 days daily with 120cc free water flushes via wife  -Will continue home tube feedings, with nutrition consulted    Tracheostomy present  See respiratory failure     ACP (advance care planning)  Advance Care Planning     Date: 04/10/2023  Palliative consulted, reviewed documentation. Full code. Needs home palliative follow up upon discharge. Time spent reviewing on 4/10/23= 5 minutes          Severe protein-calorie malnutrition  Nutrition consulted. Most recent weight and BMI monitored-   Wt Readings from Last 3 Encounters:   04/07/23 0730 66.6 kg (146 lb 13.2 oz)   04/06/23 0715 65.7 kg (144 lb 13.5 oz)   04/05/23 0715 97.8 kg (215 lb 9.8 oz)   04/04/23 0715 97.3 kg (214 lb 8.1 oz)   04/03/23 0715 67 kg (147 lb 11.3 oz)   03/27/23 1324 68 kg (150 lb)   03/20/23 1130 68 kg (150 lb)   03/18/23 1747 68 kg (150 lb)   03/18/23 1028 68 kg (150 lb)   03/16/23 1600 65.8 kg (145 lb) "   01/12/23 1414 65.3 kg (143 lb 15.4 oz)     Body mass index is 22.32 kg/m².     Recommendations: Recommendation/Intervention: 1. Continue with TF recs; Monitor diet advancements. 2. Advance diet as tolerated and appropriate. 3. Monitor weight changes; check weekly weights.  Goals: 1. Diet advancement by RD follow up.    Patient has been screened and assessed by RD. RD will follow patient.      Secondary malignant neoplasm of cervical lymph node  Noted      Acute blood loss anemia  Pt presented with hemoptysis. Scopes prior to admit and by ENT during admit not showing clear source. Bleeding improved. Pt underwent LHC for NSTEMI. After that procedure he was found to have a large retroperitoneal hematoma. Since starting DAPT he has also had recurrent hemoptysis as well as melanic stools. Trach cuff reinflated 4/2 with improvement in bleeding.  - trend CBC, transfuse prn  - Hgb now stable  - continue asa, plavix     COPD exacerbation  This seems resolved; however, did develop worsening hypoxic/hypercapnic respiratory failure  - coninue nebs      Other hyperlipidemia  -Continue home statin    Primary hypertension  Previously hypotensive, now normotensive  - continue metoprolol     Coronary artery disease involving native coronary artery of native heart with unstable angina pectoris  Troponin elevated when pt was upgraded to the ICU. At that time it was thought to be due to demand, however he had persistent chest pain concerning for ACS. Was started on heparin gtt and tolerated. Subsequently went to [facility] which showed diffuse disease. Pt not a candidate for CABG. He subsequently underwent PCI of LAD and LCx.  - Continue asa, plavix, statin    Squamous cell cancer of tongue  Dx 2021, now s/p total glossectomy, bilateral neck dissection, bilateral cervical facial flaps and anterolateral thigh flap reconstruction of glossectomy defect 1/4/22. Completed adjuvant chemoradiation. Had trach changed by ENT 2 days prior to admit  and scope at that time showed no signs of bleeding despite hemoptysis present on admission.     Carotid stenosis  Continues on asa, plavix, statin       Peripheral vascular disease  Continues on asa, plavix, statin         VTE Risk Mitigation (From admission, onward)         Ordered     IP VTE LOW RISK PATIENT  Once         04/11/23 0909     Place sequential compression device  Until discontinued         03/18/23 1620     Place NIKITA hose  Until discontinued         03/18/23 1620                Discharge Planning   NISA: 4/17/2023     Code Status: Full Code   Is the patient medically ready for discharge?:     Reason for patient still in hospital (select all that apply): Patient trending condition  Discharge Plan A: Rehab   Discharge Delays: None known at this time    12:14 PM Updated wife Bridgett Richard by phone.     Critical care time spent on the evaluation and treatment of severe organ dysfunction, review of pertinent labs and imaging studies, discussions with consulting providers and discussions with patient/family: 30 minutes.      Caprice Nava MD  Department of Hospital Medicine   [facility] - Intensive Care

## 2023-04-13 NOTE — NURSING
[facility] [place]  Nurses Note -- 4 Eyes      4/12/2023       Skin assessed on: Q Shift      [x] No Pressure Injuries Present    []Prevention Measures Documented    [] Yes LDA  for Pressure Injury Previously documented     [] Yes New Pressure Injury Discovered   [] LDA for New Pressure Injury Added      Attending RN:  Lubna Arias RN     Second RN:  Pasquale Burger RN

## 2023-04-13 NOTE — ASSESSMENT & PLAN NOTE
At home is on 5L O2 via trach and O2 sats typically in high 80s low 90s. Worsening respiration this admission due to aspiration of blood and likely food aspiration. Required ventilator from 3/21 to 3/23, subsequently weaned. Treated empirically for pneumonia. Respiratory cultures show Stenotrophomonas which was not treated  - started bactrim to treat Stenotrophomonas  - worsening hypoxia and hypercapnia on 4/11. This could be related to trazodone use (attempted 4/10 PM for sleep)   - transferred to ICU and placed on ventilator   - treating potential pneumonia. Known Stenotrophomonas- treating with bactrim. Trach aspirate with GNR- on cefepime while awaiting speciation    - does not appear that he has had another MI   - significantly improved    - Pulmonary is following

## 2023-04-13 NOTE — PLAN OF CARE
CM[facility] DAILY GOALS       A: Awake    RASS: Goal -    Actual - RASS (Johnson Agitation-Sedation Scale): agitated   Restraint necessity: Clinical Justification: Removing medical devices, Climbing out of bed, Treatment Interference  B: Breathe   SBT: Fail   C: Coordinate A & B, analgesics/sedatives   Pain: managed    SAT: NA  D: Delirium   CAM-ICU: Overall CAM-ICU: Negative  E: Early(intubated/ Progressive (non-intubated) Mobility   MOVE Screen: Fail   Activity: Activity Management: Rolling - L1  FAS: Feeding/Nutrition   Diet order: Diet/Nutrition Received: tube feeding, Specialty Diet/Nutrition Received: other (see comments) (isosource)  T: Thrombus   DVT prophylaxis: VTE Required Core Measure: Pharmacological prophylaxis initiated/maintained  H: HOB Elevation   Head of Bed (HOB) Positioning: HOB elevated  U: Ulcer Prophylaxis   GI: yes  G: Glucose control   managed    S: Skin   Bathing/Skin Care: linen changed  Device Skin Pressure Protection: absorbent pad utilized/changed, pressure points protected, skin-to-device areas padded, skin-to-skin areas padded  Pressure Reduction Devices: specialty bed utilized  Pressure Reduction Techniques: heels elevated off bed, pressure points protected  Skin Protection: incontinence pads utilized, skin-to-device areas padded, transparent dressing maintained, skin-to-skin areas padded, tubing/devices free from skin contact  B: Bowel Function   no issues   I: Indwelling Catheters   Lainez necessity: [REMOVED]      Urethral Catheter 03/31/23 1905 Non-latex 16 Fr.-Reason for Continuing Urinary Catheterization: Urinary retention, Critically ill in ICU and requiring hourly monitoring of intake/output   CVC necessity: Yes  D: De-escalation Antibiotics   Yes    Family/Goals of care/Code Status   Code Status: Full Code    24H Vital Sign Range  Temp:  [98.5 °F (36.9 °C)]   Pulse:  [82-99]   Resp:  [23-35]   BP: (109-144)/()   SpO2:  [88 %-100 %]      Shift Events   No acute events  throughout shift    VS and assessment per flow sheet, patient progressing towards goals as tolerated, plan of care reviewed with  patient , all concerns addressed, will continue to monitor.    Lubna Arias

## 2023-04-13 NOTE — PROGRESS NOTES
"[facility] Intensive Care  Critical Care Medicine  Progress Note    Patient Name: Fareed Richard Jr.  MRN: [id number]  Admission Date: 3/18/2023  Hospital Length of Stay: 25 days  Code Status: Full Code  Attending Provider: Caprice Nava MD  Primary Care Provider: Kodi Tubbs MD   Principal Problem: Acute on chronic respiratory failure with hypoxia and hypercapnia    Subjective:     HPI:  Asked to evaluate for "hypotension and bleeding from trach site (replaced a few days ago)".    He was seen in ENT clinic by Dr. Smalls on 3/16 for routinely scheduled follow up and trach change.  He was having some thick yellow secretions with perhaps a hint of pink from the trach at time even before the trach exchange.  Subsequently, he was noted to have increase in congestion with more bloody-looking sputum.  He denies fever/chills/night sweats but has been more congested with dyspnea noted.  The day of presentation to the ED (3/18), secretions were frankly bloody => so he came to the ED.  He denies prior similar episodes.  He is on chronic aspirin and Plavix due to severe vascular disease with past stenting.    Review of past chest CT from October 2022 shows severe peripheral bullous changes, as well as centrilobular emphysema changes.  There is a modest left pleural effusion and left upper lobe nodular opacity.  There is bronchiectasis and diffuse micronodular changes in a tree-in-bud configuration to suggest chronic small airway inflammation/infection.  Chest CT at this time shows similar findings but now shows increased bibasilar air-space disease with consolidation/air bronchograms consistent with pneumonia.  Procalcitonin is normal.  Troponin has bumped up since admission to suggest possible demand ischemia in the setting of worsening anemia and hypotension.        Patient previously seen in Pulmonary Clinic by Dr. Wadsworth in October 2022.  From Dr. Wadsworth's note:    Fareed Richard Jr. is a 72 y.o. male who presents for " evaluation of chronic hypoxemic respiratory failure. He coughs daily, particularly in the evening. The cough is productive of yellow mucus which he expectorates without difficult via his trach. He did not experience chronic cough prior to his trach. He experiences chronic dyspnea on exertion. He takes wixela inhaler 1 puff BID (still inhales through his mouth) & an albuterol nebulizer. He has been taking prednisone for the past six weeks (for copd?). He takes glycopyrrolate due to constant oral secretions.      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 S/p tracheostomy, neck dissection, & total glossectomy January 2022.     HeSince his last visit he was hospitalized for acute respiratory failure requiring intubation and had an extended hospital course; admitted 4/19/22 and discharged 5/12/22.  3 month post-treatment PET CT reviewed; no evidence of local residual or recurrent disease.  Pulmonary findings likely reflect sequelae of recent hospitalization.  Concern for active inflammation going on; agree with starting steroids and getting PFTs.  Will need repeat CT chest in 8 weeks and follow up with pulmonology.     PMH: CAD status post RCA PCI in 2000, carotid stenosis status post carotid enterectomy in 2018, PAD status post bilateral femoral atherectomy and hypertension.     He is a former 2PPD smoker; he quit six years ago.        Hospital/ICU Course:  No notes on file    Interval History: Mr. Richard is more comfortable today and doing well with PS. Continued thick secretions but mildly improved       Objective:     Vital Signs (Most Recent):  Temp: 97.7 °F (36.5 °C) (04/13/23 0800)  Pulse: 89 (04/13/23 1234)  Resp: (!) 23.9 (04/13/23 1234)  BP: 113/62 (04/13/23 1100)  SpO2: 95 % (04/13/23 1234)   Vital Signs (24h Range):  Temp:  [97.7 °F (36.5 °C)-98.5 °F (36.9 °C)] 97.7 °F (36.5 °C)  Pulse:  [82-99] 89  Resp:  [17-35] 23.9  SpO2:  [95 %-100 %] 95 %  BP: (105-142)/(62-73)  113/62     Weight: 66.6 kg (146 lb 13.2 oz)  Body mass index is 22.32 kg/m².      Intake/Output Summary (Last 24 hours) at 4/13/2023 1323  Last data filed at 4/13/2023 1144  Gross per 24 hour   Intake 1775.58 ml   Output 1550 ml   Net 225.58 ml       Physical Exam  Vitals reviewed.   Constitutional:       Appearance: He is ill-appearing (chronically ill). He is not toxic-appearing.   HENT:      Head: Normocephalic and atraumatic.      Nose: Nose normal. No congestion.      Mouth/Throat:      Mouth: Mucous membranes are moist.      Pharynx: Oropharynx is clear. No oropharyngeal exudate.   Eyes:      Extraocular Movements: Extraocular movements intact.      Conjunctiva/sclera: Conjunctivae normal.      Pupils: Pupils are equal, round, and reactive to light.   Neck:      Comments: Cuffed 8 trach   Cardiovascular:      Rate and Rhythm: Normal rate and regular rhythm.      Pulses: Normal pulses.      Heart sounds: No murmur heard.  Pulmonary:      Effort: Tachypnea present. No respiratory distress.      Breath sounds: Rhonchi present. No wheezing.      Comments: Increased respiratory effort when PS is weaned.   Abdominal:      General: Abdomen is flat. Bowel sounds are normal.      Palpations: Abdomen is soft.   Neurological:      Comments: Not responding to commands or voice      Review of Systems    Vents:  Vent Mode: PS/CPAP (04/13/23 1234)  Ventilator Initiated: Yes (04/11/23 0936)  Set Rate: 24 BPM (04/13/23 0940)  Vt Set: 400 mL (04/13/23 0940)  Pressure Support: 10 cmH20 (04/13/23 1234)  PEEP/CPAP: 5 cmH20 (04/13/23 1234)  Oxygen Concentration (%): 40 (04/13/23 1256)  Peak Airway Pressure: 16.1 cmH20 (04/13/23 1234)  Total Ve: 8.91 L/m (04/13/23 1234)  F/VT Ratio<105 (RSBI): (!) 49.56 (04/13/23 1234)    Lines/Drains/Airways       Peripherally Inserted Central Catheter Line  Duration             PICC Triple Lumen 03/20/23 2145 right basilic 23 days              Drain  Duration                   Gastrostomy/Enterostomy 01/04/22 Percutaneous endoscopic gastrostomy (PEG)  days    Male External Urinary Catheter 04/12/23 1900 <1 day              Airway  Duration             Adult Surgical Airway 03/16/23 1014 Shiley Cuffed 6.0 28 days                    Significant Labs:    CBC/Anemia Profile:  Recent Labs   Lab 04/12/23 0318 04/13/23  0259   WBC 5.82 7.25   HGB 8.4* 9.0*   HCT 28.0* 29.3*    225   MCV 99* 99*   RDW 17.8* 18.2*        Chemistries:  Recent Labs   Lab 04/12/23 0318 04/13/23  0259    136   K 4.3 4.5    100   CO2 32* 32*   BUN 16 17   CREATININE 0.8 0.8   CALCIUM 8.4* 8.7   ALBUMIN 2.0* 2.1*   PROT 5.4* 5.8*   BILITOT 0.4 0.5   ALKPHOS 99 109   ALT 17 25   AST 23 34       All pertinent labs within the past 24 hours have been reviewed.    Significant Imaging:  I have reviewed all pertinent imaging results/findings within the past 24 hours.      ABG  Recent Labs   Lab 04/11/23  1030   PH 7.224*   PO2 56   PCO2 98.2*   HCO3 40.6*   BE 10     Assessment/Plan:     ENT  Tracheostomy present  Tracheostomy in place since surgical resection for head/neck cancer in January 2022.  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    No signs of ongoing bleeding at the moment     Pulmonary  * Acute on chronic respiratory failure with hypoxia and hypercapnia  Vent Mode: PS/CPAP  Oxygen Concentration (%):  [30-40] 40  Resp Rate Total:  [17 br/min-36 br/min] 27 br/min  Vt Set:  [400 mL] 400 mL  PEEP/CPAP:  [5 cmH20] 5 cmH20  Pressure Support:  [10 cmH20] 10 cmH20  Mean Airway Pressure:  [8.5 zdM93-14.5 cmH20] 8.5 cmH20  Placed back on the ventilator for hypercapnea  - hypoxia and hypercapnea - weaning O2 for goal sat of 88% and above  - COPD treatment as above  - sputum culture- no clear signs of infection on CXR  - improving    Hemoptysis  Likely secondary to lower respiratory tract infection in the setting  of ASA/Plavix.  It seems less likely to be physical complication of the tracheostomy tube itself.    · Antibiotics for pneumonia.  · Check sputum for culture, including AFB for possible MAC infection.  · Respiratory viral panel.  · OK to resume anti-platelet medications; monitor for bleeding   · No need for additional nebulized TXA.  No bleeding since 3/19  · Appreciate ENT evaluation     COPD exacerbation  Continue duonebs but not suspecting this as the primary  at this time.  - thick secretions have been an issue, adding cpt and mucomyst nebs to assist with thinning secretions for clearance.   - follow up sputum culture (GNR currently)       Critical Care Time: 40 minutes  Critical secondary to Patient has a condition that poses threat to life and bodily function: Severe Respiratory Distress      Critical care was time spent personally by me on the following activities: development of treatment plan with patient or surrogate and bedside caregivers, discussions with consultants, evaluation of patient's response to treatment, examination of patient, ordering and performing treatments and interventions, ordering and review of laboratory studies, ordering and review of radiographic studies, pulse oximetry, re-evaluation of patient's condition. This critical care time did not overlap with that of any other provider or involve time for any procedures.     Alessandra Meza MD  Critical Care Medicine  [facility] - [place] - Intensive Care

## 2023-04-13 NOTE — ASSESSMENT & PLAN NOTE
4/13/2023  - interval chart reviewed in detail ; pt discussed during ICU MDT rounds and with hospital primary Dr. Nava  - pt has continued to have improved overall status since transfer to ICU   - MDT discussion of potential option of transfer to LTACH prior to pt's desired goal of inpatient rehab and then home; wife has expressed concern for pt's eventual return to med surg floor, and pt continues to have complex respiratory care needs   - called pt's wife to discuss this option in depth; she is open to this option, especially if [facility] was a possibility  - wife has requested that MDT wait to address LTACH with pt until she is well enough to visit in person and/or when pt is accepted to LTACH; concerned pt will think this is a plan to get him to nursing home NH and will become anxious (which is a historical trigger for resp sysmptoms); she would like to be present to reassure pt and assist in LTACH explanation if pt is accepted to LTACH   - ongoing emotional support and addressing of wife's questions/concerns     4/12/2023  - interval chart reviewed in detail ; pt discussed during ICU MDT rounds and with hospital primary Dr. Nava  - pt with marked improvement to LOC and AMS; participating in conversations through mouthing and writing; he denies remembering any events from yesterday and was surprised to be in ICU and on vent this morning  - He still wishes to pursue continued treatment with goal for rehab following hospital admission   - also called pt's wife to provide brief medical update and reassure that pt is doing much better today, since she hasn't been able to visit yesterday or today due to not feeling well; she shares pt's goal for transition to rehab once stable enough to do so   - Bridgett shared her concern when pt is ready for transfer back to the floor; discussed and reassured  - emotional support provided to pt and wife during interactions; allowed time for questions/concerns   - updated  hospital primary     4/11/2023  - interval chart reviewed in detail; discussed pt's return to ICU during ICU MDT rounds   - call placed to pt's wife, Bridgett, to ensure she was updated on transfer to ICU; allowed her to share her account of information provided about this and care since leaving ICU   - wife shares MDT's concerns regarding AMS, and respiratory status; concerned that pt removed his trach a few nights ago and then length of time the trach was potentially out; she is also concerned pt 's continued attempts to pull at PEG and other lines; advocating that she is ok with the use of soft restraints if needed for pt safety (discussed Ms. Urias's concerns with both Dr. Nava (primary) and Dr. Meza ([facility])  - plan for continued GOC/ACP conversations with pt's wife while pt lacks capacity   - emotional support provided to wife; allowed time for questions/concerns, all addressed   - pt remained unresponsive throughout the day during attempted visits; on ventilator since transfer to ICU  - wife called in afternoon; brief medical update; she shared she has been ill today (possible GI bug) and will no visit today to be safe for pt; available by phone however if pt status changes, can come if needed   - ongoing communication with MDT     Please see prior palliative notes/consult for previous GOC/ACP discussions.

## 2023-04-13 NOTE — PLAN OF CARE
1126:  Reassessment    Patient arrived from:  home  Discharge plan at this time:  LTAC  Barriers to DC: Mr Richard remains in the ICU trach to vent     Message received from Trupti Palliative care NP.  Per Trupti she spoke with patient's wife who agreed to LTAC with [facility] as #1 choice.  Patient choice form completed by SHANICE and placed in chart.  Per Trupti Mrs Richard requested that referral be sent to [facility] first but she would consider other options if [facility].    CM called María at [facility] to notify her of change of DC plan.  Per her request a request to cancel rehab auth faxed to [phone].    [facility] via [employer].     CM will continue to follow and finalize plans.    1508:  Per [facility] they are no longer in network.  CM called wife, Bridgett who agreed for referral to be sent to [facility].  Referral sent to [facility] via [employer].        04/13/23 1124   Discharge Reassessment   Assessment Type Discharge Planning Reassessment   Did the patient's condition or plan change since previous assessment? Yes   Discharge Plan discussed with:   (Palliative Care NP, Trupti)   Why the patient remains in the hospital Requires continued medical care   Post-Acute Status   Post-Acute Authorization Placement   Post-Acute Placement Status Referrals Sent

## 2023-04-13 NOTE — PROGRESS NOTES
Pharmacokinetic Assessment Follow Up: IV Vancomycin    Vancomycin serum concentration assessment(s):    The trough level was drawn correctly and can be used to guide therapy at this time. The measurement is above the desired definitive target range of 10 to 20 mcg/mL.    Vancomycin Regimen Plan:    Hold dose on 4/13/23 @ 01:00 and   Change regimen to Vancomycin 500 mg IV every 12 hours with next scheduled dose, with next serum trough concentration measured at 00:00 prior to fourth dose on 4/15/23      Drug levels (last 3 results):  Recent Labs   Lab Result Units 04/12/23  2350   Vancomycin-Trough ug/mL 21.3       Pharmacy will continue to follow and monitor vancomycin.    Please contact pharmacy at extension [phone] for questions regarding this assessment.    Thank you for the consult,   Nadine Matthews       Patient brief summary:  Fareed Richard Jr. is a 73 y.o. male initiated on antimicrobial therapy with IV Vancomycin for treatment of  bacteremia    The patient's current regimen is Vancomycin 500 mg IV q12h     Drug Allergies:   Review of patient's allergies indicates:   Allergen Reactions    Ativan [lorazepam] Anxiety       Actual Body Weight:   66.6 kg    Renal Function:   Estimated Creatinine Clearance: 76.3 mL/min (based on SCr of 0.8 mg/dL).,     Dialysis Method (if applicable):  N/A    CBC (last 72 hours):  Recent Labs   Lab Result Units 04/11/23  0843 04/12/23 0318 04/13/23  0259   WBC K/uL 6.76 5.82 7.25   Hemoglobin g/dL 9.4* 8.4* 9.0*   Hematocrit % 31.2* 28.0* 29.3*   Platelets K/uL 201 214 225   Gran % % 86.5* 80.0* 81.3*   Lymph % % 4.1* 6.0* 6.6*   Mono % % 7.5 11.3 9.5   Eosinophil % % 1.2 2.2 1.9   Basophil % % 0.3 0.2 0.3   Differential Method  Automated Automated Automated       Metabolic Panel (last 72 hours):  Recent Labs   Lab Result Units 04/11/23  0843 04/11/23  1222 04/12/23 0318 04/13/23  0259   Sodium mmol/L 140  --  136 136   Potassium mmol/L 4.2  --  4.3 4.5   Chloride mmol/L 101  --   101 100   CO2 mmol/L 35*  --  32* 32*   Glucose mg/dL 174*  --  182* 155*   Glucose, UA   --  Negative  --   --    BUN mg/dL 15  --  16 17   Creatinine mg/dL 0.8  --  0.8 0.8   Albumin g/dL 2.3*  --  2.0* 2.1*   Total Bilirubin mg/dL 0.5  --  0.4 0.5   Alkaline Phosphatase U/L 112  --  99 109   AST U/L 29  --  23 34   ALT U/L 22  --  17 25       Vancomycin Administrations:  vancomycin given in the last 96 hours                     vancomycin 750 mg in dextrose 5 % (D5W) 250 mL IVPB (Vial-Mate) (mg) 750 mg New Bag 04/12/23 1420     750 mg New Bag  0209    vancomycin (VANCOCIN) 1,750 mg in dextrose 5 % (D5W) 500 mL IVPB (mg) 1,750 mg New Bag 04/11/23 1318                    Microbiologic Results:  Microbiology Results (last 7 days)       Procedure Component Value Units Date/Time    Blood culture [[id number]] Collected: 04/11/23 1240    Order Status: Completed Specimen: Blood from Antecubital, Left Arm Updated: 04/12/23 1503     Blood Culture, Routine No Growth to date      No Growth to date    Narrative:      hard stick    Blood culture [[id number]] Collected: 04/11/23 1231    Order Status: Completed Specimen: Blood Updated: 04/12/23 1503     Blood Culture, Routine No Growth to date      No Growth to date    Culture, Respiratory with Gram Stain [[id number]]  (Abnormal) Collected: 04/11/23 1438    Order Status: Completed Specimen: Respiratory from Tracheal Aspirate Updated: 04/12/23 1106     Respiratory Culture GRAM NEGATIVE CRISTINA, NON-LACTOSE   Moderate  Identification and susceptibility pending       Gram Stain (Respiratory) Rare WBC's     Gram Stain (Respiratory) Many Gram positive rods     Gram Stain (Respiratory) Rare Gram positive cocci in pairs

## 2023-04-13 NOTE — ASSESSMENT & PLAN NOTE
This seems resolved; however, did develop worsening hypoxic/hypercapnic respiratory failure  - coninue nebs

## 2023-04-13 NOTE — SUBJECTIVE & OBJECTIVE
Interval History: Mr. Richard is more comfortable today and doing well with PS. Continued thick secretions but mildly improved       Objective:     Vital Signs (Most Recent):  Temp: 97.7 °F (36.5 °C) (04/13/23 0800)  Pulse: 89 (04/13/23 1234)  Resp: (!) 23.9 (04/13/23 1234)  BP: 113/62 (04/13/23 1100)  SpO2: 95 % (04/13/23 1234)   Vital Signs (24h Range):  Temp:  [97.7 °F (36.5 °C)-98.5 °F (36.9 °C)] 97.7 °F (36.5 °C)  Pulse:  [82-99] 89  Resp:  [17-35] 23.9  SpO2:  [95 %-100 %] 95 %  BP: (105-142)/(62-73) 113/62     Weight: 66.6 kg (146 lb 13.2 oz)  Body mass index is 22.32 kg/m².      Intake/Output Summary (Last 24 hours) at 4/13/2023 1323  Last data filed at 4/13/2023 1144  Gross per 24 hour   Intake 1775.58 ml   Output 1550 ml   Net 225.58 ml       Physical Exam  Vitals reviewed.   Constitutional:       Appearance: He is ill-appearing (chronically ill). He is not toxic-appearing.   HENT:      Head: Normocephalic and atraumatic.      Nose: Nose normal. No congestion.      Mouth/Throat:      Mouth: Mucous membranes are moist.      Pharynx: Oropharynx is clear. No oropharyngeal exudate.   Eyes:      Extraocular Movements: Extraocular movements intact.      Conjunctiva/sclera: Conjunctivae normal.      Pupils: Pupils are equal, round, and reactive to light.   Neck:      Comments: Cuffed 8 trach   Cardiovascular:      Rate and Rhythm: Normal rate and regular rhythm.      Pulses: Normal pulses.      Heart sounds: No murmur heard.  Pulmonary:      Effort: Tachypnea present. No respiratory distress.      Breath sounds: Rhonchi present. No wheezing.      Comments: Increased respiratory effort when PS is weaned.   Abdominal:      General: Abdomen is flat. Bowel sounds are normal.      Palpations: Abdomen is soft.   Neurological:      Comments: Not responding to commands or voice      Review of Systems    Vents:  Vent Mode: PS/CPAP (04/13/23 1234)  Ventilator Initiated: Yes (04/11/23 0936)  Set Rate: 24 BPM (04/13/23  0940)  Vt Set: 400 mL (04/13/23 0940)  Pressure Support: 10 cmH20 (04/13/23 1234)  PEEP/CPAP: 5 cmH20 (04/13/23 1234)  Oxygen Concentration (%): 40 (04/13/23 1256)  Peak Airway Pressure: 16.1 cmH20 (04/13/23 1234)  Total Ve: 8.91 L/m (04/13/23 1234)  F/VT Ratio<105 (RSBI): (!) 49.56 (04/13/23 1234)    Lines/Drains/Airways       Peripherally Inserted Central Catheter Line  Duration             PICC Triple Lumen 03/20/23 2145 right basilic 23 days              Drain  Duration                  Gastrostomy/Enterostomy 01/04/22 Percutaneous endoscopic gastrostomy (PEG)  days    Male External Urinary Catheter 04/12/23 1900 <1 day              Airway  Duration             Adult Surgical Airway 03/16/23 1014 Shiley Cuffed 6.0 28 days                    Significant Labs:    CBC/Anemia Profile:  Recent Labs   Lab 04/12/23 0318 04/13/23  0259   WBC 5.82 7.25   HGB 8.4* 9.0*   HCT 28.0* 29.3*    225   MCV 99* 99*   RDW 17.8* 18.2*        Chemistries:  Recent Labs   Lab 04/12/23 0318 04/13/23  0259    136   K 4.3 4.5    100   CO2 32* 32*   BUN 16 17   CREATININE 0.8 0.8   CALCIUM 8.4* 8.7   ALBUMIN 2.0* 2.1*   PROT 5.4* 5.8*   BILITOT 0.4 0.5   ALKPHOS 99 109   ALT 17 25   AST 23 34       All pertinent labs within the past 24 hours have been reviewed.    Significant Imaging:  I have reviewed all pertinent imaging results/findings within the past 24 hours.

## 2023-04-13 NOTE — ASSESSMENT & PLAN NOTE
Troponin elevated when pt was upgraded to the ICU. At that time it was thought to be due to demand, however he had persistent chest pain concerning for ACS. Was started on heparin gtt and tolerated. Subsequently went to [facility] which showed diffuse disease. Pt not a candidate for CABG. He subsequently underwent PCI of LAD and LCx.  - Continue asa, plavix, statin

## 2023-04-13 NOTE — ASSESSMENT & PLAN NOTE
Continue duonebs but not suspecting this as the primary  at this time.  - thick secretions have been an issue, adding cpt and mucomyst nebs to assist with thinning secretions for clearance.   - follow up sputum culture (GNR currently)

## 2023-04-13 NOTE — ASSESSMENT & PLAN NOTE
Vent Mode: PS/CPAP  Oxygen Concentration (%):  [30-40] 40  Resp Rate Total:  [17 br/min-36 br/min] 27 br/min  Vt Set:  [400 mL] 400 mL  PEEP/CPAP:  [5 cmH20] 5 cmH20  Pressure Support:  [10 cmH20] 10 cmH20  Mean Airway Pressure:  [8.5 cnF35-90.5 cmH20] 8.5 cmH20  Placed back on the ventilator for hypercapnea  - hypoxia and hypercapnea - weaning O2 for goal sat of 88% and above  - COPD treatment as above  - sputum culture- no clear signs of infection on CXR  - improving

## 2023-04-13 NOTE — SUBJECTIVE & OBJECTIVE
Medications:  Continuous Infusions:    Scheduled Meds:   acetylcysteine 100 mg/ml (10%)  4 mL Nebulization TID WAKE    albuterol-ipratropium  3 mL Nebulization Q4H    aspirin  81 mg Oral Daily    atorvastatin  80 mg Per G Tube Daily    ceFEPime (MAXIPIME) IVPB  2 g Intravenous Q8H    chlorhexidine  15 mL Mouth/Throat BID    clopidogreL  75 mg Oral Daily    famotidine (PF)  20 mg Intravenous BID    ferrous sulfate  1 tablet Per G Tube Daily    melatonin  9 mg Per G Tube Nightly    metoprolol tartrate  25 mg Per G Tube BID    senna-docusate 8.6-50 mg  2 tablet Per G Tube BID    sodium chloride 0.9%  10 mL Intravenous Q6H    sulfamethoxazole-trimethoprim 800-160mg  1 tablet Per G Tube BID     PRN Meds:acetaminophen, albuterol-ipratropium, atropine, bisacodyL, [] fentaNYL **FOLLOWED BY** fentaNYL, guaiFENesin 100 mg/5 ml, hydrOXYzine HCL, insulin aspart U-100, loperamide, naloxone, nitroGLYCERIN, ondansetron, oxyCODONE, phenyleph-min oil-petrolatum, Flushing PICC Protocol **AND** sodium chloride 0.9% **AND** sodium chloride 0.9%    Objective:     Vital Signs (Most Recent):  Temp: 97.7 °F (36.5 °C) (23 0800)  Pulse: 94 (23 1401)  Resp: (!) 24 (23 1401)  BP: 113/62 (23 1100)  SpO2: 98 % (23 1401)   Vital Signs (24h Range):  Temp:  [97.7 °F (36.5 °C)-98.5 °F (36.9 °C)] 97.7 °F (36.5 °C)  Pulse:  [82-99] 94  Resp:  [17-35] 24  SpO2:  [95 %-100 %] 98 %  BP: (105-142)/(62-73) 113/62     Weight: 66.6 kg (146 lb 13.2 oz)  Body mass index is 22.32 kg/m².    Physical Exam  Vitals and nursing note reviewed.   Constitutional:       General: He is not in acute distress.     Appearance: He is ill-appearing.      Comments: Lying in bed with eyes closed; did not respond to name   HENT:      Head:      Comments: Temporal wasting  Cardiovascular:      Rate and Rhythm: Normal rate and regular rhythm.   Pulmonary:      Effort: No respiratory distress.      Comments: Ventilator to trach   Abdominal:       Comments: PEG    Musculoskeletal:      Right lower leg: No edema.      Left lower leg: No edema.      Comments: Muscular wasting    Neurological:      Mental Status: He is alert. He is disoriented.      Comments: Not responding to name or commands; twitching/non-purposeful movements of upper extremities (sometimes correlated to noises in rooms)      Advance Care Planning   Advance Directives:   Living Will: No    LaPOST: No    Do Not Resuscitate Status: No    Medical Power of : No    Goals of Care: What is most important right now is to focus on spending time at home, remaining as independent as possible, symptom/pain control, curative/life-prolongation (regardless of treatment burdens). Accordingly, we have decided that the best plan to meet the patient's goals includes continuing with treatment.     Significant Labs: All pertinent labs within the past 24 hours have been reviewed.  CBC:   Recent Labs   Lab 04/13/23 0259   WBC 7.25   HGB 9.0*   HCT 29.3*   MCV 99*          BMP:  Recent Labs   Lab 04/13/23 0259   *      K 4.5      CO2 32*   BUN 17   CREATININE 0.8   CALCIUM 8.7       LFT:  Lab Results   Component Value Date    AST 34 04/13/2023    ALKPHOS 109 04/13/2023    BILITOT 0.5 04/13/2023     Albumin:   Albumin   Date Value Ref Range Status   04/13/2023 2.1 (L) 3.5 - 5.2 g/dL Final     Protein:   Total Protein   Date Value Ref Range Status   04/13/2023 5.8 (L) 6.0 - 8.4 g/dL Final     Lactic acid:   Lab Results   Component Value Date    LACTATE 0.5 04/11/2023    LACTATE 1.8 03/19/2023     Significant Imaging: I have reviewed all pertinent imaging results/findings within the past 24 hours.

## 2023-04-13 NOTE — ASSESSMENT & PLAN NOTE
3/22 Respiratory culture: Stenotrophomonas, Achromobacter--> on bactrim x14 days for this  4/11 Respiratory culture: GNR--> on cefepime while awaiting speciation

## 2023-04-13 NOTE — SUBJECTIVE & OBJECTIVE
Interval History:On vent, awake and alert. No complaints.     Review of Systems   Unable to perform ROS: Intubated   Objective:     Vital Signs (Most Recent):  Temp: 97.7 °F (36.5 °C) (04/13/23 0800)  Pulse: 90 (04/13/23 1100)  Resp: 19.3 (04/13/23 1100)  BP: 113/62 (04/13/23 1100)  SpO2: 98 % (04/13/23 1100)   Vital Signs (24h Range):  Temp:  [97.7 °F (36.5 °C)-98.5 °F (36.9 °C)] 97.7 °F (36.5 °C)  Pulse:  [82-99] 90  Resp:  [17-35] 19.3  SpO2:  [88 %-100 %] 98 %  BP: (105-142)/(62-86) 113/62     Weight: 66.6 kg (146 lb 13.2 oz)  Body mass index is 22.32 kg/m².    Intake/Output Summary (Last 24 hours) at 4/13/2023 1210  Last data filed at 4/13/2023 1144  Gross per 24 hour   Intake 1775.58 ml   Output 1550 ml   Net 225.58 ml        Physical Exam  Vitals and nursing note reviewed.   Constitutional:       General: He is not in acute distress.     Appearance: He is ill-appearing. He is not toxic-appearing.   HENT:      Head: Normocephalic and atraumatic.      Comments: Temporal wasting     Nose: Nose normal.      Mouth/Throat:      Comments: Edentulous.   Neck:      Comments: Trach in place, attached to vent  Cardiovascular:      Rate and Rhythm: Normal rate and regular rhythm.   Pulmonary:      Effort: No respiratory distress.      Breath sounds: No wheezing, rhonchi or rales.      Comments: On vent, diminished bilaterally  Abdominal:      General: Bowel sounds are normal. There is distension.      Palpations: Abdomen is soft.      Comments: PEG in place   Musculoskeletal:      Right lower leg: No edema.      Left lower leg: No edema.      Comments: Muscular wasting   Skin:     General: Skin is warm and dry.   Neurological:      Mental Status: He is disoriented.      Comments: Awake and alert, following commands       Significant Labs: All pertinent labs within the past 24 hours have been reviewed.    Significant Imaging: I have reviewed all pertinent imaging results/findings within the past 24 hours.

## 2023-04-13 NOTE — PT/OT/SLP PROGRESS
Physical Therapy      Patient Name:  Fareed Richard Jr.   MRN:  [id number]    Patient not seen today secondary to  (Transfer to ICU requiring Intubation). Failed MOVE screen Will follow-up as able.

## 2023-04-13 NOTE — RESPIRATORY THERAPY
Patient remains on SERVOu ventilator with #6 shiley trach in midline position. Current ventilator settings PRVC RATE 24, TIDAL VOLUME 400 ML, PEEP +5. FI02 40% with alarms on, set and functioning. AMBU BAG and mask at bedside. Oral care and suctioning done. Will continue with ventilator management.

## 2023-04-13 NOTE — ASSESSMENT & PLAN NOTE
Dx 2021, now s/p total glossectomy, bilateral neck dissection, bilateral cervical facial flaps and anterolateral thigh flap reconstruction of glossectomy defect 1/4/22. Completed adjuvant chemoradiation. Had trach changed by ENT 2 days prior to admit and scope at that time showed no signs of bleeding despite hemoptysis present on admission.

## 2023-04-13 NOTE — PROGRESS NOTES
[facility] - Intensive Care  Palliative Medicine  Progress Note    Patient Name: Fareed Richard Jr.  MRN: [id number]  Admission Date: 3/18/2023  Hospital Length of Stay: 25 days  Code Status: Full Code   Attending Provider: Caprice Nava MD  Consulting Provider: Trupti Beck NP  Primary Care Physician: Kodi Tubbs MD  Principal Problem:Acute on chronic respiratory failure with hypoxia and hypercapnia    Patient information was obtained from patient, spouse/SO and primary team.      Assessment/Plan:   Advance Care Planning   Palliative Care  4/13/2023  - interval chart reviewed in detail ; pt discussed during ICU MDT rounds and with hospital primary Dr. Nava  - pt has continued to have improved overall status since transfer to ICU   - MDT discussion of potential option of transfer to LTACH prior to pt's desired goal of inpatient rehab and then home; wife has expressed concern for pt's eventual return to med surg floor, and pt continues to have complex respiratory care needs   - called pt's wife to discuss this option in depth; she is open to this option, especially if [facility] was a possibility  - wife has requested that MDT wait to address LTACH with pt until she is well enough to visit in person and/or when pt is accepted to LTACH; concerned pt will think this is a plan to get him to long term NH and will become anxious (which is a historical trigger for resp sysmptoms); she would like to be present to reassure pt and assist in LTACH explanation if pt is accepted to LTACH   - ongoing emotional support and addressing of wife's questions/concerns   - updated primary and CM/SW  - additional call placed to pt's wife per CM/SW request     4/12/2023  - interval chart reviewed in detail ; pt discussed during ICU MDT rounds and with hospital primary Dr. Nava  - pt with marked improvement to LOC and AMS; participating in conversations through mouthing and writing; he denies remembering any  events from yesterday and was surprised to be in ICU and on vent this morning  - He still wishes to pursue continued treatment with goal for rehab following hospital admission   - also called pt's wife to provide brief medical update and reassure that pt is doing much better today, since she hasn't been able to visit yesterday or today due to not feeling well; she shares pt's goal for transition to rehab once stable enough to do so   - Bridgett shared her concern when pt is ready for transfer back to the floor; discussed and reassured  - emotional support provided to pt and wife during interactions; allowed time for questions/concerns   - updated hospital primary     4/11/2023  - interval chart reviewed in detail; discussed pt's return to ICU during ICU MDT rounds   - call placed to pt's wife, Bridgett, to ensure she was updated on transfer to ICU; allowed her to share her account of information provided about this and care since leaving ICU   - wife shares MDT's concerns regarding AMS, and respiratory status; concerned that pt removed his trach a few nights ago and then length of time the trach was potentially out; she is also concerned pt 's continued attempts to pull at PEG and other lines; advocating that she is ok with the use of soft restraints if needed for pt safety (discussed Ms. Urias's concerns with both Dr. Nava (primary) and Dr. Meza (PCCM)  - plan for continued GOC/ACP conversations with pt's wife while pt lacks capacity   - emotional support provided to wife; allowed time for questions/concerns, all addressed   - pt remained unresponsive throughout the day during attempted visits; on ventilator since transfer to ICU  - wife called in afternoon; brief medical update; she shared she has been ill today (possible GI bug) and will no visit today to be safe for pt; available by phone however if pt status changes, can come if needed   - ongoing communication with MDT     Please see prior palliative notes/consult  for previous GOC/ACP discussions.     ENT  Tracheostomy present  - was initially admitted with significant bleeding to trach site, an additional episode during admission; pt self-removed trach overnight/early AM 4/9-4/10 requiring RT to re-insert trach; length of time trach was removed unclear but no respiratory distress/need for resuscitation per documentation and verbal reports from MDT   - pt has frustrations with limited ability to verbalize/communicate; can become frustrated/anxious/dyspneic at times related to this; writes in a notebook often to support communication   - noted; management per hospital primary    Pulmonary  * Acute on chronic respiratory failure with hypoxia and hypercapnia  - trach; now returned to ICU requiring vent; hypercapnic   - respiratory status greatly affected during periods of anxiety during admit   - noted; management per hospital primary and [facility]     Cardiac/Vascular  NSTEMI (non-ST elevated myocardial infarction)  - Echo showed EF 60%, inferobasal hypokinesis, indeterminate diastolic function  - nitro drip weaned; pt denying any continued angina  - cardiac cath procedure on 3/23 and 3/27 with stent placements ; per cardiology poor CABG candidate   - continued concern for pt's tolerance/bleeding risk, but anticoagulation necessary give recent stent placements; cardiology following   - noted; management per hospital primary, and Cardiology     Oncology  Squamous cell cancer of tongue  - Diagnosed 12/15/21 via FNA; Underwent total glossectomy, bilateral neck dissection, bilateral cervical facial flaps and anterolateral thigh flap reconstruction of glossectomy defect; completed adjuvant chemoradiation  - Followed by oncology and ENT outpt. No longer on chemo or radiation.   - has a trach since above treatment   - noted; management per hospital primary     GI  PEG (percutaneous endoscopic gastrostomy) status  - PEG for feeding and meds; tube feeds typically well tolerated and no  "difficulty with home management by wife   - wife concerned of condition of PEG tube; original PEG tube in place; wife requesting GI consult to discuss while inpatient incase exchange is needed   - noted; management per hospital primary     I will follow-up with patient. Please contact us if you have any additional questions.    Total visit time: 85 minutes    > 50% of  35  min visit spent in chart review, face to face discussion of symptom assessment, coordination of care with other specialists, documentation, and discharge planning.    50 min ACP time spent: goals of care, emotional support, formulating and communicating prognosis, exploring burden/ benefit of various approaches of treatment.     Trupti Beck NP  Palliative Medicine  [facility] [place] - Intensive Care  Subjective:     Chief Complaint:   Chief Complaint   Patient presents with    Hemoptysis     Ems called to 72yo male that wife noticed was having blood y sputum in his trach on Wednesday. Went thursdsay to have a trach change and the dr was unale to scope his mouth above the trach due to him gagging but did change the trach. Continued to have the pink sputum until 0300 today and it had bright red blood from trach. Denied any pain or SOB       HPI:   From H&P: " Fareed Richard Jr. 73 y.o. 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  Per his wife 4 days ago he began to have pink tinged sputum via his trach.  He was seen by his ENT 2 days ago with a scope exam and a trach exchange without evidence of bleeding.  This morning at 3:00 a.m. he developed bright red blood via trach which resolved without intervention.  It is since not recurred.  He takes a daily aspirin.  He receives all medications via G-tube.  His tube feeding regimen consists of 6 cans of Isosource daily with 120 cc free water boluses.  He denies fever chest pain " "shortness of breath nausea vomiting abdominal pain leg swelling syncope dizziness dysuria melena hematuria hematemesis.     In the ED, hemoglobin 7.6 INR 1.0 BUN 50 creatinine 0.7  troponin negative BRCA negative O positive type and screen chest x-ray without detrimental change hypotensive to 85/54."     Palliative medicine consulted for goals of care and advance care planning; Met with pt at bedside; for details of visit, see advance care planning section of plan.         Hospital Course:  No notes on file    Medications:  Continuous Infusions:    Scheduled Meds:   acetylcysteine 100 mg/ml (10%)  4 mL Nebulization TID WAKE    albuterol-ipratropium  3 mL Nebulization Q4H    aspirin  81 mg Oral Daily    atorvastatin  80 mg Per G Tube Daily    ceFEPime (MAXIPIME) IVPB  2 g Intravenous Q8H    chlorhexidine  15 mL Mouth/Throat BID    clopidogreL  75 mg Oral Daily    famotidine (PF)  20 mg Intravenous BID    ferrous sulfate  1 tablet Per G Tube Daily    melatonin  9 mg Per G Tube Nightly    metoprolol tartrate  25 mg Per G Tube BID    senna-docusate 8.6-50 mg  2 tablet Per G Tube BID    sodium chloride 0.9%  10 mL Intravenous Q6H    sulfamethoxazole-trimethoprim 800-160mg  1 tablet Per G Tube BID     PRN Meds:acetaminophen, albuterol-ipratropium, atropine, bisacodyL, [] fentaNYL **FOLLOWED BY** fentaNYL, guaiFENesin 100 mg/5 ml, hydrOXYzine HCL, insulin aspart U-100, loperamide, naloxone, nitroGLYCERIN, ondansetron, oxyCODONE, phenyleph-min oil-petrolatum, Flushing PICC Protocol **AND** sodium chloride 0.9% **AND** sodium chloride 0.9%    Objective:     Vital Signs (Most Recent):  Temp: 97.7 °F (36.5 °C) (23 0800)  Pulse: 94 (23 1401)  Resp: (!) 24 (23 1401)  BP: 113/62 (23 1100)  SpO2: 98 % (23 1401)   Vital Signs (24h Range):  Temp:  [97.7 °F (36.5 °C)-98.5 °F (36.9 °C)] 97.7 °F (36.5 °C)  Pulse:  [82-99] 94  Resp:  [17-35] 24  SpO2:  [95 %-100 %] 98 %  BP: " (105-142)/(62-73) 113/62     Weight: 66.6 kg (146 lb 13.2 oz)  Body mass index is 22.32 kg/m².    Physical Exam  Vitals and nursing note reviewed.   Constitutional:       General: He is not in acute distress.     Appearance: He is ill-appearing.      Comments: Lying in bed with eyes closed; did not respond to name   HENT:      Head:      Comments: Temporal wasting  Cardiovascular:      Rate and Rhythm: Normal rate and regular rhythm.   Pulmonary:      Effort: No respiratory distress.      Comments: Ventilator to trach   Abdominal:      Comments: PEG    Musculoskeletal:      Right lower leg: No edema.      Left lower leg: No edema.      Comments: Muscular wasting    Neurological:      Mental Status: He is alert. He is disoriented.      Comments: Not responding to name or commands; twitching/non-purposeful movements of upper extremities (sometimes correlated to noises in rooms)      Advance Care Planning   Advance Directives:   Living Will: No    LaPOST: No    Do Not Resuscitate Status: No    Medical Power of : No    Goals of Care: What is most important right now is to focus on spending time at home, remaining as independent as possible, symptom/pain control, curative/life-prolongation (regardless of treatment burdens). Accordingly, we have decided that the best plan to meet the patient's goals includes continuing with treatment.     Significant Labs: All pertinent labs within the past 24 hours have been reviewed.  CBC:   Recent Labs   Lab 04/13/23 0259   WBC 7.25   HGB 9.0*   HCT 29.3*   MCV 99*          BMP:  Recent Labs   Lab 04/13/23 0259   *      K 4.5      CO2 32*   BUN 17   CREATININE 0.8   CALCIUM 8.7       LFT:  Lab Results   Component Value Date    AST 34 04/13/2023    ALKPHOS 109 04/13/2023    BILITOT 0.5 04/13/2023     Albumin:   Albumin   Date Value Ref Range Status   04/13/2023 2.1 (L) 3.5 - 5.2 g/dL Final     Protein:   Total Protein   Date Value Ref Range Status    04/13/2023 5.8 (L) 6.0 - 8.4 g/dL Final     Lactic acid:   Lab Results   Component Value Date    LACTATE 0.5 04/11/2023    LACTATE 1.8 03/19/2023     Significant Imaging: I have reviewed all pertinent imaging results/findings within the past 24 hours.

## 2023-04-13 NOTE — PT/OT/SLP PROGRESS
Occupational Therapy      Patient Name:  Fareed Richard .   MRN:  [id number]    Patient not seen today secondary to pt in ICU, still requiring the Vent. Will follow-up as appropriate.    4/13/2023

## 2023-04-13 NOTE — RESPIRATORY THERAPY
Pt taken off of Ventilator and placed on 40% Trach collar. WOB normal, no apparent sign of resp distress. Will continue to monitor  and wean  fi02 as tolerated.

## 2023-04-14 LAB
ALBUMIN SERPL BCP-MCNC: 2 G/DL (ref 3.5–5.2)
ALP SERPL-CCNC: 104 U/L (ref 55–135)
ALT SERPL W/O P-5'-P-CCNC: 25 U/L (ref 10–44)
ANION GAP SERPL CALC-SCNC: 8 MMOL/L (ref 8–16)
AST SERPL-CCNC: 30 U/L (ref 10–40)
BACTERIA SPEC AEROBE CULT: ABNORMAL
BASOPHILS # BLD AUTO: 0.01 K/UL (ref 0–0.2)
BASOPHILS NFR BLD: 0.1 % (ref 0–1.9)
BILIRUB SERPL-MCNC: 0.5 MG/DL (ref 0.1–1)
BUN SERPL-MCNC: 20 MG/DL (ref 8–23)
CALCIUM SERPL-MCNC: 8.4 MG/DL (ref 8.7–10.5)
CHLORIDE SERPL-SCNC: 97 MMOL/L (ref 95–110)
CO2 SERPL-SCNC: 32 MMOL/L (ref 23–29)
CREAT SERPL-MCNC: 0.9 MG/DL (ref 0.5–1.4)
DIFFERENTIAL METHOD: ABNORMAL
EOSINOPHIL # BLD AUTO: 0.1 K/UL (ref 0–0.5)
EOSINOPHIL NFR BLD: 1.8 % (ref 0–8)
ERYTHROCYTE [DISTWIDTH] IN BLOOD BY AUTOMATED COUNT: 18 % (ref 11.5–14.5)
EST. GFR  (NO RACE VARIABLE): >60 ML/MIN/1.73 M^2
GLUCOSE SERPL-MCNC: 118 MG/DL (ref 70–110)
GRAM STN SPEC: ABNORMAL
HCT VFR BLD AUTO: 28.9 % (ref 40–54)
HGB BLD-MCNC: 8.9 G/DL (ref 14–18)
IMM GRANULOCYTES # BLD AUTO: 0.03 K/UL (ref 0–0.04)
IMM GRANULOCYTES NFR BLD AUTO: 0.4 % (ref 0–0.5)
LYMPHOCYTES # BLD AUTO: 0.3 K/UL (ref 1–4.8)
LYMPHOCYTES NFR BLD: 3.6 % (ref 18–48)
MCH RBC QN AUTO: 30 PG (ref 27–31)
MCHC RBC AUTO-ENTMCNC: 30.8 G/DL (ref 32–36)
MCV RBC AUTO: 97 FL (ref 82–98)
MONOCYTES # BLD AUTO: 0.7 K/UL (ref 0.3–1)
MONOCYTES NFR BLD: 10.1 % (ref 4–15)
NEUTROPHILS # BLD AUTO: 5.8 K/UL (ref 1.8–7.7)
NEUTROPHILS NFR BLD: 84 % (ref 38–73)
NRBC BLD-RTO: 0 /100 WBC
PLATELET # BLD AUTO: 224 K/UL (ref 150–450)
PMV BLD AUTO: 9.6 FL (ref 9.2–12.9)
POCT GLUCOSE: 123 MG/DL (ref 70–110)
POCT GLUCOSE: 141 MG/DL (ref 70–110)
POCT GLUCOSE: 148 MG/DL (ref 70–110)
POCT GLUCOSE: 158 MG/DL (ref 70–110)
POTASSIUM SERPL-SCNC: 4.8 MMOL/L (ref 3.5–5.1)
PROT SERPL-MCNC: 5.8 G/DL (ref 6–8.4)
RBC # BLD AUTO: 2.97 M/UL (ref 4.6–6.2)
SODIUM SERPL-SCNC: 137 MMOL/L (ref 136–145)
WBC # BLD AUTO: 6.85 K/UL (ref 3.9–12.7)

## 2023-04-14 PROCEDURE — 94761 N-INVAS EAR/PLS OXIMETRY MLT: CPT

## 2023-04-14 PROCEDURE — 99232 SBSQ HOSP IP/OBS MODERATE 35: CPT | Mod: ,,, | Performed by: NURSE PRACTITIONER

## 2023-04-14 PROCEDURE — 99291 PR CRITICAL CARE, E/M 30-74 MINUTES: ICD-10-PCS | Mod: ,,, | Performed by: INTERNAL MEDICINE

## 2023-04-14 PROCEDURE — 94640 AIRWAY INHALATION TREATMENT: CPT

## 2023-04-14 PROCEDURE — 99232 PR SUBSEQUENT HOSPITAL CARE,LEVL II: ICD-10-PCS | Mod: ,,, | Performed by: NURSE PRACTITIONER

## 2023-04-14 PROCEDURE — 99900035 HC TECH TIME PER 15 MIN (STAT)

## 2023-04-14 PROCEDURE — 63600175 PHARM REV CODE 636 W HCPCS: Performed by: HOSPITALIST

## 2023-04-14 PROCEDURE — 25000003 PHARM REV CODE 250: Performed by: HOSPITALIST

## 2023-04-14 PROCEDURE — 25000242 PHARM REV CODE 250 ALT 637 W/ HCPCS: Performed by: HOSPITALIST

## 2023-04-14 PROCEDURE — 20000000 HC ICU ROOM

## 2023-04-14 PROCEDURE — 25000242 PHARM REV CODE 250 ALT 637 W/ HCPCS: Performed by: INTERNAL MEDICINE

## 2023-04-14 PROCEDURE — 99291 CRITICAL CARE FIRST HOUR: CPT | Mod: ,,, | Performed by: INTERNAL MEDICINE

## 2023-04-14 PROCEDURE — 80053 COMPREHEN METABOLIC PANEL: CPT | Performed by: HOSPITALIST

## 2023-04-14 PROCEDURE — 94003 VENT MGMT INPAT SUBQ DAY: CPT

## 2023-04-14 PROCEDURE — 63600175 PHARM REV CODE 636 W HCPCS: Performed by: STUDENT IN AN ORGANIZED HEALTH CARE EDUCATION/TRAINING PROGRAM

## 2023-04-14 PROCEDURE — 85025 COMPLETE CBC W/AUTO DIFF WBC: CPT | Performed by: HOSPITALIST

## 2023-04-14 PROCEDURE — 99900026 HC AIRWAY MAINTENANCE (STAT)

## 2023-04-14 PROCEDURE — 27000221 HC OXYGEN, UP TO 24 HOURS

## 2023-04-14 PROCEDURE — 94668 MNPJ CHEST WALL SBSQ: CPT

## 2023-04-14 PROCEDURE — 36415 COLL VENOUS BLD VENIPUNCTURE: CPT | Performed by: HOSPITALIST

## 2023-04-14 PROCEDURE — A4216 STERILE WATER/SALINE, 10 ML: HCPCS | Performed by: HOSPITALIST

## 2023-04-14 RX ORDER — LORAZEPAM 2 MG/ML
0.5 INJECTION INTRAMUSCULAR EVERY 4 HOURS PRN
Status: DISCONTINUED | OUTPATIENT
Start: 2023-04-14 | End: 2023-04-30 | Stop reason: HOSPADM

## 2023-04-14 RX ADMIN — ACETYLCYSTEINE 4 ML: 100 SOLUTION ORAL; RESPIRATORY (INHALATION) at 08:04

## 2023-04-14 RX ADMIN — IPRATROPIUM BROMIDE AND ALBUTEROL SULFATE 3 ML: 2.5; .5 SOLUTION RESPIRATORY (INHALATION) at 08:04

## 2023-04-14 RX ADMIN — CEFEPIME HYDROCHLORIDE 2 G: 2 INJECTION, SOLUTION INTRAVENOUS at 04:04

## 2023-04-14 RX ADMIN — Medication 10 ML: at 11:04

## 2023-04-14 RX ADMIN — IPRATROPIUM BROMIDE AND ALBUTEROL SULFATE 3 ML: 2.5; .5 SOLUTION RESPIRATORY (INHALATION) at 03:04

## 2023-04-14 RX ADMIN — SENNOSIDES AND DOCUSATE SODIUM 2 TABLET: 50; 8.6 TABLET ORAL at 08:04

## 2023-04-14 RX ADMIN — IPRATROPIUM BROMIDE AND ALBUTEROL SULFATE 3 ML: 2.5; .5 SOLUTION RESPIRATORY (INHALATION) at 11:04

## 2023-04-14 RX ADMIN — SULFAMETHOXAZOLE AND TRIMETHOPRIM 1 TABLET: 800; 160 TABLET ORAL at 08:04

## 2023-04-14 RX ADMIN — Medication 10 ML: at 06:04

## 2023-04-14 RX ADMIN — LORAZEPAM 0.5 MG: 2 INJECTION INTRAMUSCULAR; INTRAVENOUS at 11:04

## 2023-04-14 RX ADMIN — CHLORHEXIDINE GLUCONATE 0.12% ORAL RINSE 15 ML: 1.2 LIQUID ORAL at 08:04

## 2023-04-14 RX ADMIN — ATORVASTATIN CALCIUM 80 MG: 40 TABLET, FILM COATED ORAL at 08:04

## 2023-04-14 RX ADMIN — CEFEPIME HYDROCHLORIDE 2 G: 2 INJECTION, SOLUTION INTRAVENOUS at 01:04

## 2023-04-14 RX ADMIN — FAMOTIDINE 20 MG: 10 INJECTION, SOLUTION INTRAVENOUS at 08:04

## 2023-04-14 RX ADMIN — FAMOTIDINE 20 MG: 10 INJECTION, SOLUTION INTRAVENOUS at 09:04

## 2023-04-14 RX ADMIN — ACETYLCYSTEINE 4 ML: 100 SOLUTION ORAL; RESPIRATORY (INHALATION) at 04:04

## 2023-04-14 RX ADMIN — IPRATROPIUM BROMIDE AND ALBUTEROL SULFATE 3 ML: 2.5; .5 SOLUTION RESPIRATORY (INHALATION) at 04:04

## 2023-04-14 RX ADMIN — METOPROLOL TARTRATE 25 MG: 25 TABLET, FILM COATED ORAL at 08:04

## 2023-04-14 RX ADMIN — IPRATROPIUM BROMIDE AND ALBUTEROL SULFATE 3 ML: 2.5; .5 SOLUTION RESPIRATORY (INHALATION) at 12:04

## 2023-04-14 RX ADMIN — MELATONIN TAB 3 MG 9 MG: 3 TAB at 08:04

## 2023-04-14 RX ADMIN — FERROUS SULFATE TAB 325 MG (65 MG ELEMENTAL FE) 1 EACH: 325 (65 FE) TAB at 08:04

## 2023-04-14 RX ADMIN — ASPIRIN 81 MG CHEWABLE TABLET 81 MG: 81 TABLET CHEWABLE at 08:04

## 2023-04-14 RX ADMIN — OXYCODONE HYDROCHLORIDE 5 MG: 5 TABLET ORAL at 08:04

## 2023-04-14 RX ADMIN — Medication 10 ML: at 05:04

## 2023-04-14 RX ADMIN — CLOPIDOGREL BISULFATE 75 MG: 75 TABLET ORAL at 08:04

## 2023-04-14 RX ADMIN — CEFEPIME HYDROCHLORIDE 2 G: 2 INJECTION, SOLUTION INTRAVENOUS at 08:04

## 2023-04-14 RX ADMIN — Medication 10 ML: at 12:04

## 2023-04-14 NOTE — SUBJECTIVE & OBJECTIVE
Interval History:   No events overnight.   Improving overall, since last decompensation  No new complaints    Review of Systems   Constitutional:  Positive for activity change and fatigue.   Cardiovascular:  Negative for chest pain.   Gastrointestinal:  Negative for abdominal pain.     Objective:     Vital Signs (Most Recent):  Temp: 98.2 °F (36.8 °C) (04/14/23 0400)  Pulse: 88 (04/14/23 0821)  Resp: (!) 27.9 (04/14/23 0821)  BP: 109/64 (04/14/23 0600)  SpO2: 99 % (04/14/23 0821)   Vital Signs (24h Range):  Temp:  [98.2 °F (36.8 °C)-98.8 °F (37.1 °C)] 98.2 °F (36.8 °C)  Pulse:  [] 88  Resp:  [17-37.7] 27.9  SpO2:  [90 %-100 %] 99 %  BP: ()/(62-78) 109/64     Weight: 66.6 kg (146 lb 13.2 oz)  Body mass index is 22.32 kg/m².    Intake/Output Summary (Last 24 hours) at 4/14/2023 0824  Last data filed at 4/14/2023 0600  Gross per 24 hour   Intake 1519.08 ml   Output 2225 ml   Net -705.92 ml        Physical Exam  Vitals reviewed.   Constitutional:       General: He is not in acute distress.     Appearance: He is well-developed and normal weight. He is ill-appearing. He is not toxic-appearing or diaphoretic.   HENT:      Head: Normocephalic and atraumatic.   Eyes:      General: No scleral icterus.     Pupils: Pupils are equal, round, and reactive to light.   Neck:      Thyroid: No thyromegaly.   Cardiovascular:      Rate and Rhythm: Normal rate and regular rhythm.      Heart sounds: No murmur heard.    No gallop.   Pulmonary:      Effort: Pulmonary effort is normal. No respiratory distress.      Breath sounds: Normal breath sounds. No stridor.   Abdominal:      General: Bowel sounds are normal. There is no distension.      Palpations: Abdomen is soft.      Tenderness: There is no abdominal tenderness.   Musculoskeletal:         General: No deformity. Normal range of motion.      Cervical back: Normal range of motion.   Skin:     General: Skin is warm.      Capillary Refill: Capillary refill takes less than 2  seconds.      Coloration: Skin is not jaundiced.      Findings: No bruising.   Neurological:      Mental Status: He is alert and oriented to person, place, and time.      Motor: Weakness present.      Gait: Gait abnormal.   Psychiatric:         Mood and Affect: Mood normal.         Behavior: Behavior normal.           Recent Results (from the past 24 hour(s))   POCT glucose    Collection Time: 04/13/23 11:38 AM   Result Value Ref Range    POCT Glucose 136 (H) 70 - 110 mg/dL   POCT glucose    Collection Time: 04/13/23  4:45 PM   Result Value Ref Range    POCT Glucose 147 (H) 70 - 110 mg/dL   POCT glucose    Collection Time: 04/13/23  8:36 PM   Result Value Ref Range    POCT Glucose 135 (H) 70 - 110 mg/dL   CBC Auto Differential    Collection Time: 04/14/23  3:46 AM   Result Value Ref Range    WBC 6.85 3.90 - 12.70 K/uL    RBC 2.97 (L) 4.60 - 6.20 M/uL    Hemoglobin 8.9 (L) 14.0 - 18.0 g/dL    Hematocrit 28.9 (L) 40.0 - 54.0 %    MCV 97 82 - 98 fL    MCH 30.0 27.0 - 31.0 pg    MCHC 30.8 (L) 32.0 - 36.0 g/dL    RDW 18.0 (H) 11.5 - 14.5 %    Platelets 224 150 - 450 K/uL    MPV 9.6 9.2 - 12.9 fL    Immature Granulocytes 0.4 0.0 - 0.5 %    Gran # (ANC) 5.8 1.8 - 7.7 K/uL    Immature Grans (Abs) 0.03 0.00 - 0.04 K/uL    Lymph # 0.3 (L) 1.0 - 4.8 K/uL    Mono # 0.7 0.3 - 1.0 K/uL    Eos # 0.1 0.0 - 0.5 K/uL    Baso # 0.01 0.00 - 0.20 K/uL    nRBC 0 0 /100 WBC    Gran % 84.0 (H) 38.0 - 73.0 %    Lymph % 3.6 (L) 18.0 - 48.0 %    Mono % 10.1 4.0 - 15.0 %    Eosinophil % 1.8 0.0 - 8.0 %    Basophil % 0.1 0.0 - 1.9 %    Differential Method Automated    Comprehensive Metabolic Panel    Collection Time: 04/14/23  3:46 AM   Result Value Ref Range    Sodium 137 136 - 145 mmol/L    Potassium 4.8 3.5 - 5.1 mmol/L    Chloride 97 95 - 110 mmol/L    CO2 32 (H) 23 - 29 mmol/L    Glucose 118 (H) 70 - 110 mg/dL    BUN 20 8 - 23 mg/dL    Creatinine 0.9 0.5 - 1.4 mg/dL    Calcium 8.4 (L) 8.7 - 10.5 mg/dL    Total Protein 5.8 (L) 6.0 - 8.4  "g/dL    Albumin 2.0 (L) 3.5 - 5.2 g/dL    Total Bilirubin 0.5 0.1 - 1.0 mg/dL    Alkaline Phosphatase 104 55 - 135 U/L    AST 30 10 - 40 U/L    ALT 25 10 - 44 U/L    Anion Gap 8 8 - 16 mmol/L    eGFR >60 >60 mL/min/1.73 m^2       Microbiology Results (last 7 days)       Procedure Component Value Units Date/Time    Blood culture [[id number]] Collected: 04/11/23 1240    Order Status: Completed Specimen: Blood from Antecubital, Left Arm Updated: 04/13/23 1503     Blood Culture, Routine No Growth to date      No Growth to date      No Growth to date    Narrative:      hard stick    Blood culture [[id number]] Collected: 04/11/23 1231    Order Status: Completed Specimen: Blood Updated: 04/13/23 1503     Blood Culture, Routine No Growth to date      No Growth to date      No Growth to date    Culture, Respiratory with Gram Stain [[id number]]  (Abnormal) Collected: 04/11/23 1438    Order Status: Completed Specimen: Respiratory from Tracheal Aspirate Updated: 04/13/23 0854     Respiratory Culture GRAM NEGATIVE CRISTINA, NON-LACTOSE   Moderate  Identification and susceptibility pending       Gram Stain (Respiratory) Rare WBC's     Gram Stain (Respiratory) Many Gram positive rods     Gram Stain (Respiratory) Rare Gram positive cocci in pairs             Imaging Results              X-Ray Chest AP Portable (Final result)  Result time 03/18/23 12:01:13      Final result by Mariano Soto MD (03/18/23 12:01:13)                   Impression:      No detrimental change when compared with 12/29/2022.      Electronically signed by: Mariano Soto MD  Date:    03/18/2023  Time:    12:01               Narrative:    EXAMINATION:  XR CHEST AP PORTABLE    CLINICAL HISTORY:  Provided history is "chest pain;  ".    TECHNIQUE:  One view of the chest.    COMPARISON:  12/29/2022.    FINDINGS:  Tracheostomy tube is present with the tip overlying the tracheal air column between the clavicular heads and sherine.  Cardiac silhouette is " borderline enlarged and similar to prior study.  Atherosclerotic calcifications overlie the aortic arch.  Probable pulmonary emphysema.  Coarsened interstitial lung markings with bibasilar subsegmental atelectasis.  Small left and trace right pleural effusions, similar to the prior study.  No pneumothorax.  Aeration is overall similar when compared with 12/29/2022. Probable percutaneous gastrostomy tube overlies the left upper quadrant of the abdomen.

## 2023-04-14 NOTE — SUBJECTIVE & OBJECTIVE
Interval History: Mr. Richard had some complaints of difficulty breathing this am but objectively had great sats and tidal volumes. Seems to have more anxiety today.       Objective:     Vital Signs (Most Recent):  Temp: 97.8 °F (36.6 °C) (04/14/23 1101)  Pulse: 85 (04/14/23 1147)  Resp: (!) 25.4 (04/14/23 1147)  BP: 114/64 (04/14/23 0701)  SpO2: 96 % (04/14/23 1147)   Vital Signs (24h Range):  Temp:  [97.8 °F (36.6 °C)-98.8 °F (37.1 °C)] 97.8 °F (36.6 °C)  Pulse:  [] 85  Resp:  [19-37.7] 25.4  SpO2:  [90 %-100 %] 96 %  BP: ()/(63-78) 114/64     Weight: 66.6 kg (146 lb 13.2 oz)  Body mass index is 22.32 kg/m².      Intake/Output Summary (Last 24 hours) at 4/14/2023 1338  Last data filed at 4/14/2023 1200  Gross per 24 hour   Intake 1939.08 ml   Output 2130 ml   Net -190.92 ml       Physical Exam  Vitals reviewed.   Constitutional:       Appearance: He is ill-appearing (chronically ill). He is not toxic-appearing.   HENT:      Head: Normocephalic and atraumatic.      Nose: Nose normal. No congestion.      Mouth/Throat:      Mouth: Mucous membranes are moist.      Pharynx: Oropharynx is clear. No oropharyngeal exudate.   Eyes:      Extraocular Movements: Extraocular movements intact.      Conjunctiva/sclera: Conjunctivae normal.      Pupils: Pupils are equal, round, and reactive to light.   Neck:      Comments: Cuffed 8 trach   Cardiovascular:      Rate and Rhythm: Normal rate and regular rhythm.      Pulses: Normal pulses.      Heart sounds: No murmur heard.  Pulmonary:      Effort: Tachypnea present. No respiratory distress.      Breath sounds: No wheezing or rhonchi.      Comments: Increased respiratory effort when PS is weaned.   Abdominal:      General: Abdomen is flat. Bowel sounds are normal.      Palpations: Abdomen is soft.   Neurological:      Comments: Not responding to commands or voice      Review of Systems    Vents:  Vent Mode: PS/CPAP (04/14/23 1147)  Ventilator Initiated: Yes (04/11/23  0936)  Set Rate: 24 BPM (04/14/23 0821)  Vt Set: 400 mL (04/14/23 0821)  Pressure Support: 15 cmH20 (04/14/23 1147)  PEEP/CPAP: 8 cmH20 (04/14/23 1147)  Oxygen Concentration (%): 35 (04/14/23 1147)  Peak Airway Pressure: 25 cmH20 (04/14/23 1147)  Total Ve: 12.51 L/m (04/14/23 1147)  F/VT Ratio<105 (RSBI): (!) 46.66 (04/14/23 1147)    Lines/Drains/Airways       Peripherally Inserted Central Catheter Line  Duration             PICC Triple Lumen 03/20/23 2145 right basilic 24 days              Drain  Duration                  Gastrostomy/Enterostomy 01/04/22 Percutaneous endoscopic gastrostomy (PEG)  days    Male External Urinary Catheter 04/12/23 1900 1 day              Airway  Duration             Adult Surgical Airway 03/16/23 1014 Shiley Cuffed 6.0 29 days                    Significant Labs:    CBC/Anemia Profile:  Recent Labs   Lab 04/13/23  0259 04/14/23  0346   WBC 7.25 6.85   HGB 9.0* 8.9*   HCT 29.3* 28.9*    224   MCV 99* 97   RDW 18.2* 18.0*        Chemistries:  Recent Labs   Lab 04/13/23  0259 04/14/23  0346    137   K 4.5 4.8    97   CO2 32* 32*   BUN 17 20   CREATININE 0.8 0.9   CALCIUM 8.7 8.4*   ALBUMIN 2.1* 2.0*   PROT 5.8* 5.8*   BILITOT 0.5 0.5   ALKPHOS 109 104   ALT 25 25   AST 34 30       All pertinent labs within the past 24 hours have been reviewed.    Significant Imaging:  I have reviewed all pertinent imaging results/findings within the past 24 hours.

## 2023-04-14 NOTE — PT/OT/SLP PROGRESS
Occupational Therapy      Patient Name:  Fareed Richard .   MRN:  [id number]    Patient not seen today secondary to pt in ICU, still requiring the Vent. Will follow-up as appropriate.    4/14/2023

## 2023-04-14 NOTE — ASSESSMENT & PLAN NOTE
Vent Mode: PS/CPAP  Oxygen Concentration (%):  [] 35  Vt Set:  [400 mL] 400 mL  PEEP/CPAP:  [5 cmH20-8 cmH20] 8 cmH20  Pressure Support:  [10 cmH20-15 cmH20] 15 cmH20  Mean Airway Pressure:  [6.5 ycK17-47.6 cmH20] 12.9 cmH20  Placed back on the ventilator for hypercapnea  - hypoxia and hypercapnea - weaning O2 for goal sat of 88% and above  - COPD treatment as above  - sputum culture- with pseudomonas  - improving

## 2023-04-14 NOTE — PROGRESS NOTES
[facility] Medicine  Progress Note    Patient Name: Fareed Richard Jr.  MRN: [id number]  Patient Class: IP- Inpatient   Admission Date: 3/18/2023  Length of Stay: 26 days  Attending Physician: Andry Zhao MD  Primary Care Provider: Kodi Tubbs MD        Subjective:     Principal Problem:Acute on chronic respiratory failure with hypoxia and hypercapnia        HPI:  Fareed Richard Jr. 73 y.o. 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  Per his wife 4 days ago he began to have pink tinged sputum via his trach.  He was seen by his ENT 2 days ago with a scope exam and a trach exchange without evidence of bleeding.  This morning at 3:00 a.m. he developed bright red blood via trach which resolved without intervention.  It is since not recurred.  He takes a daily aspirin.  He receives all medications via G-tube.  His tube feeding regimen consists of 6 cans of Isosource daily with 120 cc free water boluses.  He denies fever chest pain shortness of breath nausea vomiting abdominal pain leg swelling syncope dizziness dysuria melena hematuria hematemesis.    In the ED, hemoglobin 7.6 INR 1.0 BUN 50 creatinine 0.7  troponin negative BRCA negative O positive type and screen chest x-ray without detrimental change hypotensive to 85/54.      Overview/Hospital Course:  74 yo M w squamous CA of tongue s/p glossectomy and reconstructive flap with trach, CAD, chronic hypoxic respiratory failure (on 5L at home) and with peg presented for evaluation of hemoptysis 2 days after tracheostomy change in clinic. Transferred to ICU 3/19 when markedly hypotensive.  Unclear if due to hemorrhage, cardiogenic or septic shock.  Did require PRBC and TXA nebs but didn't seem like sufficient bleeding to cause shock.  Bleeding stopped and ENT following.  CTA shows either significant mucus plugging  or bibasilar pneumonia - pulmonology favored pneumonia.  Trach aspirate with Stenotrophomonas, Achromobacter, and Candida. Also urine culture growing Enterococcus.  He is on broad spectrum antibiotics including bactrim. Developed NSTEMI. Cardiology consulted. [facility] 3/23 which showed distal LM 30%, mild LAD 90% bifurcation lesion with D1, Cx mid 80%, RCA moderate diffuse disease with long 70% and some left to right collateral. All vessels heavily calcified. Not a candidate for CABG but plan for staged PCI. Continued on heparin drip, asa/plavix. Vasopressors weaned. [facility] 3/27 PCI to LAD and LCx. Post operatively he was hemodynamically unstable and anemic. Stat CTA showed RP hematoma near L kidney without acute bleeding. Pt transfused supportively. He improved over the next few days. Stepped down to floor. Remains on trach collar, higher than home O2 requirements. He has delirium as well and poor sleep. Attempted trazodone. The following morning, he was much more lethargic and was transferred to ICU for worsening acute on chronic hypoxic/hypercapnic respiratory failure. This improved after 1 day on the vent.     Trach aspirate with GNR; remains on cefepime in addition to bactrim for prior Stenotrophomonas. Improving at 98%, wean O2.       Interval History:   No events overnight.   Improving overall, since last decompensation  No new complaints    Review of Systems   Constitutional:  Positive for activity change and fatigue.   Cardiovascular:  Negative for chest pain.   Gastrointestinal:  Negative for abdominal pain.     Objective:     Vital Signs (Most Recent):  Temp: 98.2 °F (36.8 °C) (04/14/23 0400)  Pulse: 88 (04/14/23 0821)  Resp: (!) 27.9 (04/14/23 0821)  BP: 109/64 (04/14/23 0600)  SpO2: 99 % (04/14/23 0821)   Vital Signs (24h Range):  Temp:  [98.2 °F (36.8 °C)-98.8 °F (37.1 °C)] 98.2 °F (36.8 °C)  Pulse:  [] 88  Resp:  [17-37.7] 27.9  SpO2:  [90 %-100 %] 99 %  BP: ()/(62-78) 109/64     Weight: 66.6 kg  (146 lb 13.2 oz)  Body mass index is 22.32 kg/m².    Intake/Output Summary (Last 24 hours) at 4/14/2023 0824  Last data filed at 4/14/2023 0600  Gross per 24 hour   Intake 1519.08 ml   Output 2225 ml   Net -705.92 ml        Physical Exam  Vitals reviewed.   Constitutional:       General: He is not in acute distress.     Appearance: He is well-developed and normal weight. He is ill-appearing. He is not toxic-appearing or diaphoretic.   HENT:      Head: Normocephalic and atraumatic.   Eyes:      General: No scleral icterus.     Pupils: Pupils are equal, round, and reactive to light.   Neck:      Thyroid: No thyromegaly.   Cardiovascular:      Rate and Rhythm: Normal rate and regular rhythm.      Heart sounds: No murmur heard.    No gallop.   Pulmonary:      Effort: Pulmonary effort is normal. No respiratory distress.      Breath sounds: Normal breath sounds. No stridor.   Abdominal:      General: Bowel sounds are normal. There is no distension.      Palpations: Abdomen is soft.      Tenderness: There is no abdominal tenderness.   Musculoskeletal:         General: No deformity. Normal range of motion.      Cervical back: Normal range of motion.   Skin:     General: Skin is warm.      Capillary Refill: Capillary refill takes less than 2 seconds.      Coloration: Skin is not jaundiced.      Findings: No bruising.   Neurological:      Mental Status: He is alert and oriented to person, place, and time.      Motor: Weakness present.      Gait: Gait abnormal.   Psychiatric:         Mood and Affect: Mood normal.         Behavior: Behavior normal.           Recent Results (from the past 24 hour(s))   POCT glucose    Collection Time: 04/13/23 11:38 AM   Result Value Ref Range    POCT Glucose 136 (H) 70 - 110 mg/dL   POCT glucose    Collection Time: 04/13/23  4:45 PM   Result Value Ref Range    POCT Glucose 147 (H) 70 - 110 mg/dL   POCT glucose    Collection Time: 04/13/23  8:36 PM   Result Value Ref Range    POCT Glucose 135  (H) 70 - 110 mg/dL   CBC Auto Differential    Collection Time: 04/14/23  3:46 AM   Result Value Ref Range    WBC 6.85 3.90 - 12.70 K/uL    RBC 2.97 (L) 4.60 - 6.20 M/uL    Hemoglobin 8.9 (L) 14.0 - 18.0 g/dL    Hematocrit 28.9 (L) 40.0 - 54.0 %    MCV 97 82 - 98 fL    MCH 30.0 27.0 - 31.0 pg    MCHC 30.8 (L) 32.0 - 36.0 g/dL    RDW 18.0 (H) 11.5 - 14.5 %    Platelets 224 150 - 450 K/uL    MPV 9.6 9.2 - 12.9 fL    Immature Granulocytes 0.4 0.0 - 0.5 %    Gran # (ANC) 5.8 1.8 - 7.7 K/uL    Immature Grans (Abs) 0.03 0.00 - 0.04 K/uL    Lymph # 0.3 (L) 1.0 - 4.8 K/uL    Mono # 0.7 0.3 - 1.0 K/uL    Eos # 0.1 0.0 - 0.5 K/uL    Baso # 0.01 0.00 - 0.20 K/uL    nRBC 0 0 /100 WBC    Gran % 84.0 (H) 38.0 - 73.0 %    Lymph % 3.6 (L) 18.0 - 48.0 %    Mono % 10.1 4.0 - 15.0 %    Eosinophil % 1.8 0.0 - 8.0 %    Basophil % 0.1 0.0 - 1.9 %    Differential Method Automated    Comprehensive Metabolic Panel    Collection Time: 04/14/23  3:46 AM   Result Value Ref Range    Sodium 137 136 - 145 mmol/L    Potassium 4.8 3.5 - 5.1 mmol/L    Chloride 97 95 - 110 mmol/L    CO2 32 (H) 23 - 29 mmol/L    Glucose 118 (H) 70 - 110 mg/dL    BUN 20 8 - 23 mg/dL    Creatinine 0.9 0.5 - 1.4 mg/dL    Calcium 8.4 (L) 8.7 - 10.5 mg/dL    Total Protein 5.8 (L) 6.0 - 8.4 g/dL    Albumin 2.0 (L) 3.5 - 5.2 g/dL    Total Bilirubin 0.5 0.1 - 1.0 mg/dL    Alkaline Phosphatase 104 55 - 135 U/L    AST 30 10 - 40 U/L    ALT 25 10 - 44 U/L    Anion Gap 8 8 - 16 mmol/L    eGFR >60 >60 mL/min/1.73 m^2       Microbiology Results (last 7 days)       Procedure Component Value Units Date/Time    Blood culture [[id number]] Collected: 04/11/23 1240    Order Status: Completed Specimen: Blood from Antecubital, Left Arm Updated: 04/13/23 1503     Blood Culture, Routine No Growth to date      No Growth to date      No Growth to date    Narrative:      hard stick    Blood culture [[id number]] Collected: 04/11/23 1231    Order Status: Completed Specimen: Blood Updated: 04/13/23  "1503     Blood Culture, Routine No Growth to date      No Growth to date      No Growth to date    Culture, Respiratory with Gram Stain [[id number]]  (Abnormal) Collected: 04/11/23 1438    Order Status: Completed Specimen: Respiratory from Tracheal Aspirate Updated: 04/13/23 0854     Respiratory Culture GRAM NEGATIVE CRISTINA, NON-LACTOSE   Moderate  Identification and susceptibility pending       Gram Stain (Respiratory) Rare WBC's     Gram Stain (Respiratory) Many Gram positive rods     Gram Stain (Respiratory) Rare Gram positive cocci in pairs             Imaging Results              X-Ray Chest AP Portable (Final result)  Result time 03/18/23 12:01:13      Final result by Mariano Soto MD (03/18/23 12:01:13)                   Impression:      No detrimental change when compared with 12/29/2022.      Electronically signed by: Mariano Soto MD  Date:    03/18/2023  Time:    12:01               Narrative:    EXAMINATION:  XR CHEST AP PORTABLE    CLINICAL HISTORY:  Provided history is "chest pain;  ".    TECHNIQUE:  One view of the chest.    COMPARISON:  12/29/2022.    FINDINGS:  Tracheostomy tube is present with the tip overlying the tracheal air column between the clavicular heads and sherine.  Cardiac silhouette is borderline enlarged and similar to prior study.  Atherosclerotic calcifications overlie the aortic arch.  Probable pulmonary emphysema.  Coarsened interstitial lung markings with bibasilar subsegmental atelectasis.  Small left and trace right pleural effusions, similar to the prior study.  No pneumothorax.  Aeration is overall similar when compared with 12/29/2022. Probable percutaneous gastrostomy tube overlies the left upper quadrant of the abdomen.                                            Assessment/Plan:      * Acute on chronic respiratory failure with hypoxia and hypercapnia  At home is on 5L O2 via trach and O2 sats typically in high 80s low 90s. Worsening respiration this admission " "due to aspiration of blood and likely food aspiration. Required ventilator from 3/21 to 3/23, subsequently weaned. Treated empirically for pneumonia. Respiratory cultures show Stenotrophomonas which was not treated  - started bactrim to treat Stenotrophomonas  - worsening hypoxia and hypercapnia on 4/11. This could be related to trazodone use (attempted 4/10 PM for sleep)   - transferred to ICU and placed on ventilator   - treating potential pneumonia. Known Stenotrophomonas- treating with bactrim. Trach aspirate with GNR- on cefepime while awaiting speciation    - does not appear that he has had another MI   - significantly improved    - Pulmonary is following       Retroperitoneal hematoma  RP hematoma discovered after LHC. See "acute blood loss anemia"      NSTEMI (non-ST elevated myocardial infarction)  See "coronary artery disease"      Anxiety  Anxiety waxes and wanes with clinical status. He also has delirium  - Stopped hydroxyzine as this could be contributing to delirium   - Prior bad reaction to ativan  - Attempted trazodone to help sleep and then had worsening encephalopathy and hypoxic/hypercapnic respiratory failure the next morning  - may try mirtazapine if this becomes an issue    Hemoptysis  -See acute blood loss anemia  -this has resolved     Pneumonia  3/22 Respiratory culture: Stenotrophomonas, Achromobacter--> on bactrim x14 days for this  4/11 Respiratory culture: GNR--> on cefepime while awaiting speciation       PEG (percutaneous endoscopic gastrostomy) status  Continue medications via PEG. Does not take oral intake.   -On isosource 1.5 6 days daily with 120cc free water flushes via wife  -Will continue home tube feedings, with nutrition consulted    Tracheostomy present  See respiratory failure     ACP (advance care planning)  Advance Care Planning     Date: 04/10/2023  Palliative consulted, reviewed documentation. Full code. Needs home palliative follow up upon discharge. Time spent " reviewing on 4/10/23= 5 minutes          Severe protein-calorie malnutrition  Nutrition consulted. Most recent weight and BMI monitored-   Wt Readings from Last 3 Encounters:   04/07/23 0730 66.6 kg (146 lb 13.2 oz)   04/06/23 0715 65.7 kg (144 lb 13.5 oz)   04/05/23 0715 97.8 kg (215 lb 9.8 oz)   04/04/23 0715 97.3 kg (214 lb 8.1 oz)   04/03/23 0715 67 kg (147 lb 11.3 oz)   03/27/23 1324 68 kg (150 lb)   03/20/23 1130 68 kg (150 lb)   03/18/23 1747 68 kg (150 lb)   03/18/23 1028 68 kg (150 lb)   03/16/23 1600 65.8 kg (145 lb)   01/12/23 1414 65.3 kg (143 lb 15.4 oz)     Body mass index is 22.32 kg/m².     Recommendations: Recommendation/Intervention: 1. Continue with TF recs; Monitor diet advancements. 2. Advance diet as tolerated and appropriate. 3. Monitor weight changes; check weekly weights.  Goals: 1. Diet advancement by RD follow up.    Patient has been screened and assessed by RD. RD will follow patient.      Secondary malignant neoplasm of cervical lymph node  Noted      Acute blood loss anemia  Pt presented with hemoptysis. Scopes prior to admit and by ENT during admit not showing clear source. Bleeding improved. Pt underwent LHC for NSTEMI. After that procedure he was found to have a large retroperitoneal hematoma. Since starting DAPT he has also had recurrent hemoptysis as well as melanic stools. Trach cuff reinflated 4/2 with improvement in bleeding.  - trend CBC, transfuse prn  - Hgb now stable  - continue asa, plavix     COPD exacerbation  This seems resolved; however, did develop worsening hypoxic/hypercapnic respiratory failure  - coninue nebs      Other hyperlipidemia  -Continue home statin    Primary hypertension  Previously hypotensive, now normotensive  - continue metoprolol     Coronary artery disease involving native coronary artery of native heart with unstable angina pectoris  Troponin elevated when pt was upgraded to the ICU. At that time it was thought to be due to demand, however he had  persistent chest pain concerning for ACS. Was started on heparin gtt and tolerated. Subsequently went to [facility] which showed diffuse disease. Pt not a candidate for CABG. He subsequently underwent PCI of LAD and LCx.  - Continue asa, plavix, statin    Squamous cell cancer of tongue  Dx 2021, now s/p total glossectomy, bilateral neck dissection, bilateral cervical facial flaps and anterolateral thigh flap reconstruction of glossectomy defect 1/4/22. Completed adjuvant chemoradiation. Had trach changed by ENT 2 days prior to admit and scope at that time showed no signs of bleeding despite hemoptysis present on admission.     Carotid stenosis  Continues on asa, plavix, statin       Peripheral vascular disease  Continues on asa, plavix, statin         VTE Risk Mitigation (From admission, onward)           Ordered     IP VTE LOW RISK PATIENT  Once         04/11/23 0909     Place sequential compression device  Until discontinued         03/18/23 1620     Place NIKITA hose  Until discontinued         03/18/23 1620                    Discharge Planning   NISA: 4/17/2023     Code Status: Full Code   Is the patient medically ready for discharge?:     Reason for patient still in hospital (select all that apply): Patient trending condition  Discharge Plan A: Rehab   Discharge Delays: None known at this time    12:14 PM Updated wife Bridgett Richard by phone.     Critical care time spent on the evaluation and treatment of severe organ dysfunction, review of pertinent labs and imaging studies, discussions with consulting providers and discussions with patient/family: 30 minutes.      nAdry Zhao MD  Department of Hospital Medicine   [facility] [place] [facility]

## 2023-04-14 NOTE — SUBJECTIVE & OBJECTIVE
Interval History: patient had a panic attack this am and has since calmed       Medications:  Continuous Infusions:  Scheduled Meds:   acetylcysteine 100 mg/ml (10%)  4 mL Nebulization TID WAKE    albuterol-ipratropium  3 mL Nebulization Q4H    aspirin  81 mg Oral Daily    atorvastatin  80 mg Per G Tube Daily    ceFEPime (MAXIPIME) IVPB  2 g Intravenous Q8H    chlorhexidine  15 mL Mouth/Throat BID    clopidogreL  75 mg Oral Daily    famotidine (PF)  20 mg Intravenous BID    ferrous sulfate  1 tablet Per G Tube Daily    melatonin  9 mg Per G Tube Nightly    metoprolol tartrate  25 mg Per G Tube BID    senna-docusate 8.6-50 mg  2 tablet Per G Tube BID    sodium chloride 0.9%  10 mL Intravenous Q6H    sulfamethoxazole-trimethoprim 800-160mg  1 tablet Per G Tube BID     PRN Meds:acetaminophen, albuterol-ipratropium, atropine, bisacodyL, [] fentaNYL **FOLLOWED BY** fentaNYL, guaiFENesin 100 mg/5 ml, hydrOXYzine HCL, insulin aspart U-100, loperamide, lorazepam, naloxone, nitroGLYCERIN, ondansetron, oxyCODONE, phenyleph-min oil-petrolatum, Flushing PICC Protocol **AND** sodium chloride 0.9% **AND** sodium chloride 0.9%    Objective:     Vital Signs (Most Recent):  Temp: 98.2 °F (36.8 °C) (23 0400)  Pulse: 85 (23 1147)  Resp: (!) 25.4 (23 1147)  BP: 109/64 (23 0600)  SpO2: 96 % (23 1147)   Vital Signs (24h Range):  Temp:  [98.2 °F (36.8 °C)-98.8 °F (37.1 °C)] 98.2 °F (36.8 °C)  Pulse:  [] 85  Resp:  [19-37.7] 25.4  SpO2:  [90 %-100 %] 96 %  BP: ()/(63-78) 109/64     Weight: 66.6 kg (146 lb 13.2 oz)  Body mass index is 22.32 kg/m².    Physical Exam  Vitals and nursing note reviewed.   Constitutional:       General: He is not in acute distress.     Appearance: He is ill-appearing. He is not toxic-appearing or diaphoretic.   HENT:      Head: Normocephalic and atraumatic.      Right Ear: External ear normal.      Left Ear: External ear normal.      Nose: Nose normal.    Cardiovascular:      Rate and Rhythm: Tachycardia present.   Pulmonary:      Comments: Trach to vent  Abdominal:      Comments: PEG   Neurological:      Mental Status: He is alert.      Motor: Weakness present.      Comments: Alert and oriented, able to nod and follow commands           Advance Care Planning   Advance Directives:   Goals of Care: What is most important right now is to focus on spending time at home, remaining as independent as possible, symptom/pain control, curative/life-prolongation (regardless of treatment burdens). Accordingly, we have decided that the best plan to meet the patient's goals includes continuing with treatment.       Significant Labs: All pertinent labs within the past 24 hours have been reviewed.  CBC:   Recent Labs   Lab 04/14/23 0346   WBC 6.85   HGB 8.9*   HCT 28.9*   MCV 97        BMP:  Recent Labs   Lab 04/14/23 0346   *      K 4.8   CL 97   CO2 32*   BUN 20   CREATININE 0.9   CALCIUM 8.4*     LFT:  Lab Results   Component Value Date    AST 30 04/14/2023    ALKPHOS 104 04/14/2023    BILITOT 0.5 04/14/2023     Albumin:   Albumin   Date Value Ref Range Status   04/14/2023 2.0 (L) 3.5 - 5.2 g/dL Final     Protein:   Total Protein   Date Value Ref Range Status   04/14/2023 5.8 (L) 6.0 - 8.4 g/dL Final     Lactic acid:   Lab Results   Component Value Date    LACTATE 0.5 04/11/2023    LACTATE 1.8 03/19/2023       Significant Imaging: None

## 2023-04-14 NOTE — PT/OT/SLP PROGRESS
Physical Therapy      Patient Name:  Fareed Richard Jr.   MRN:  [id number]    Patient not seen today for PT tx secondary to Transfer to ICU, await clearance. Pt continued to be on the vent this am. Will follow-up on Monday 4/17/23 as appropriate pending tolerance of weaning off vent to trach collar. Thank you.

## 2023-04-14 NOTE — PROGRESS NOTES
"[facility] Intensive Care  Critical Care Medicine  Progress Note    Patient Name: Fareed Richard Jr.  MRN: [id number]  Admission Date: 3/18/2023  Hospital Length of Stay: 26 days  Code Status: Full Code  Attending Provider: Andry Zhao MD  Primary Care Provider: Kodi Tubbs MD   Principal Problem: Acute on chronic respiratory failure with hypoxia and hypercapnia    Subjective:     HPI:  Asked to evaluate for "hypotension and bleeding from trach site (replaced a few days ago)".    He was seen in ENT clinic by Dr. Smalls on 3/16 for routinely scheduled follow up and trach change.  He was having some thick yellow secretions with perhaps a hint of pink from the trach at time even before the trach exchange.  Subsequently, he was noted to have increase in congestion with more bloody-looking sputum.  He denies fever/chills/night sweats but has been more congested with dyspnea noted.  The day of presentation to the ED (3/18), secretions were frankly bloody => so he came to the ED.  He denies prior similar episodes.  He is on chronic aspirin and Plavix due to severe vascular disease with past stenting.    Review of past chest CT from October 2022 shows severe peripheral bullous changes, as well as centrilobular emphysema changes.  There is a modest left pleural effusion and left upper lobe nodular opacity.  There is bronchiectasis and diffuse micronodular changes in a tree-in-bud configuration to suggest chronic small airway inflammation/infection.  Chest CT at this time shows similar findings but now shows increased bibasilar air-space disease with consolidation/air bronchograms consistent with pneumonia.  Procalcitonin is normal.  Troponin has bumped up since admission to suggest possible demand ischemia in the setting of worsening anemia and hypotension.        Patient previously seen in Pulmonary Clinic by Dr. Wadsworth in October 2022.  From Dr. Wadsworth's note:    Fareed Richard Jr. is a 72 y.o. male who presents for " evaluation of chronic hypoxemic respiratory failure. He coughs daily, particularly in the evening. The cough is productive of yellow mucus which he expectorates without difficult via his trach. He did not experience chronic cough prior to his trach. He experiences chronic dyspnea on exertion. He takes wixela inhaler 1 puff BID (still inhales through his mouth) & an albuterol nebulizer. He has been taking prednisone for the past six weeks (for copd?). He takes glycopyrrolate due to constant oral secretions.      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 S/p tracheostomy, neck dissection, & total glossectomy January 2022.     HeSince his last visit he was hospitalized for acute respiratory failure requiring intubation and had an extended hospital course; admitted 4/19/22 and discharged 5/12/22.  3 month post-treatment PET CT reviewed; no evidence of local residual or recurrent disease.  Pulmonary findings likely reflect sequelae of recent hospitalization.  Concern for active inflammation going on; agree with starting steroids and getting PFTs.  Will need repeat CT chest in 8 weeks and follow up with pulmonology.     PMH: CAD status post RCA PCI in 2000, carotid stenosis status post carotid enterectomy in 2018, PAD status post bilateral femoral atherectomy and hypertension.     He is a former 2PPD smoker; he quit six years ago.        Hospital/ICU Course:  No notes on file    Interval History: Mr. Richard had some complaints of difficulty breathing this am but objectively had great sats and tidal volumes. Seems to have more anxiety today.       Objective:     Vital Signs (Most Recent):  Temp: 97.8 °F (36.6 °C) (04/14/23 1101)  Pulse: 85 (04/14/23 1147)  Resp: (!) 25.4 (04/14/23 1147)  BP: 114/64 (04/14/23 0701)  SpO2: 96 % (04/14/23 1147)   Vital Signs (24h Range):  Temp:  [97.8 °F (36.6 °C)-98.8 °F (37.1 °C)] 97.8 °F (36.6 °C)  Pulse:  [] 85  Resp:  [19-37.7] 25.4  SpO2:  [90  %-100 %] 96 %  BP: ()/(63-78) 114/64     Weight: 66.6 kg (146 lb 13.2 oz)  Body mass index is 22.32 kg/m².      Intake/Output Summary (Last 24 hours) at 4/14/2023 1338  Last data filed at 4/14/2023 1200  Gross per 24 hour   Intake 1939.08 ml   Output 2130 ml   Net -190.92 ml       Physical Exam  Vitals reviewed.   Constitutional:       Appearance: He is ill-appearing (chronically ill). He is not toxic-appearing.   HENT:      Head: Normocephalic and atraumatic.      Nose: Nose normal. No congestion.      Mouth/Throat:      Mouth: Mucous membranes are moist.      Pharynx: Oropharynx is clear. No oropharyngeal exudate.   Eyes:      Extraocular Movements: Extraocular movements intact.      Conjunctiva/sclera: Conjunctivae normal.      Pupils: Pupils are equal, round, and reactive to light.   Neck:      Comments: Cuffed 8 trach   Cardiovascular:      Rate and Rhythm: Normal rate and regular rhythm.      Pulses: Normal pulses.      Heart sounds: No murmur heard.  Pulmonary:      Effort: Tachypnea present. No respiratory distress.      Breath sounds: No wheezing or rhonchi.      Comments: Increased respiratory effort when PS is weaned.   Abdominal:      General: Abdomen is flat. Bowel sounds are normal.      Palpations: Abdomen is soft.   Neurological:      Comments: Not responding to commands or voice      Review of Systems    Vents:  Vent Mode: PS/CPAP (04/14/23 1147)  Ventilator Initiated: Yes (04/11/23 0936)  Set Rate: 24 BPM (04/14/23 0821)  Vt Set: 400 mL (04/14/23 0821)  Pressure Support: 15 cmH20 (04/14/23 1147)  PEEP/CPAP: 8 cmH20 (04/14/23 1147)  Oxygen Concentration (%): 35 (04/14/23 1147)  Peak Airway Pressure: 25 cmH20 (04/14/23 1147)  Total Ve: 12.51 L/m (04/14/23 1147)  F/VT Ratio<105 (RSBI): (!) 46.66 (04/14/23 1147)    Lines/Drains/Airways       Peripherally Inserted Central Catheter Line  Duration             PICC Triple Lumen 03/20/23 2145 right basilic 24 days              Drain  Duration                   Gastrostomy/Enterostomy 01/04/22 Percutaneous endoscopic gastrostomy (PEG)  days    Male External Urinary Catheter 04/12/23 1900 1 day              Airway  Duration             Adult Surgical Airway 03/16/23 1014 Shiley Cuffed 6.0 29 days                    Significant Labs:    CBC/Anemia Profile:  Recent Labs   Lab 04/13/23  0259 04/14/23  0346   WBC 7.25 6.85   HGB 9.0* 8.9*   HCT 29.3* 28.9*    224   MCV 99* 97   RDW 18.2* 18.0*        Chemistries:  Recent Labs   Lab 04/13/23  0259 04/14/23  0346    137   K 4.5 4.8    97   CO2 32* 32*   BUN 17 20   CREATININE 0.8 0.9   CALCIUM 8.7 8.4*   ALBUMIN 2.1* 2.0*   PROT 5.8* 5.8*   BILITOT 0.5 0.5   ALKPHOS 109 104   ALT 25 25   AST 34 30       All pertinent labs within the past 24 hours have been reviewed.    Significant Imaging:  I have reviewed all pertinent imaging results/findings within the past 24 hours.      ABG  Recent Labs   Lab 04/11/23  1030   PH 7.224*   PO2 56   PCO2 98.2*   HCO3 40.6*   BE 10     Assessment/Plan:     ENT  Tracheostomy present  Tracheostomy in place since surgical resection for head/neck cancer in January 2022.  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    No signs of ongoing bleeding at the moment     Pulmonary  * Acute on chronic respiratory failure with hypoxia and hypercapnia  Vent Mode: PS/CPAP  Oxygen Concentration (%):  [] 35  Vt Set:  [400 mL] 400 mL  PEEP/CPAP:  [5 cmH20-8 cmH20] 8 cmH20  Pressure Support:  [10 cmH20-15 cmH20] 15 cmH20  Mean Airway Pressure:  [6.5 obE68-07.6 cmH20] 12.9 cmH20  Placed back on the ventilator for hypercapnea  - hypoxia and hypercapnea - weaning O2 for goal sat of 88% and above  - COPD treatment as above  - sputum culture- with pseudomonas  - improving    Hemoptysis  Likely secondary to lower respiratory tract infection in the setting of ASA/Plavix.  It seems less likely to be  physical complication of the tracheostomy tube itself.    · Antibiotics for pneumonia.  · Check sputum for culture, including AFB for possible MAC infection.  · Respiratory viral panel.  · OK to resume anti-platelet medications; monitor for bleeding   · No need for additional nebulized TXA.  No bleeding since 3/19  · Appreciate ENT evaluation     COPD exacerbation  Continue duonebs but not suspecting this as the primary  at this time.  - thick secretions have been an issue, adding cpt and mucomyst nebs to assist with thinning secretions for clearance.   - follow up sputum culture with pan sensitive pseudomonas       Critical Care Time: 45 minutes  Critical secondary to Patient has a condition that poses threat to life and bodily function: Severe Respiratory Distress      Critical care was time spent personally by me on the following activities: development of treatment plan with patient or surrogate and bedside caregivers, discussions with consultants, evaluation of patient's response to treatment, examination of patient, ordering and performing treatments and interventions, ordering and review of laboratory studies, ordering and review of radiographic studies, pulse oximetry, re-evaluation of patient's condition. This critical care time did not overlap with that of any other provider or involve time for any procedures.     Alessandra Meza MD  Critical Care Medicine  [facility] - Intensive Care

## 2023-04-14 NOTE — PROGRESS NOTES
[facility] [place] - Intensive Care  Palliative Medicine  Progress Note    Patient Name: Fareed Richard Jr.  MRN: [id number]  Admission Date: 3/18/2023  Hospital Length of Stay: 26 days  Code Status: Full Code   Attending Provider: Andry Zhao MD  Consulting Provider: Cindy Buckley NP  Primary Care Physician: Kodi Tubbs MD  Principal Problem:Acute on chronic respiratory failure with hypoxia and hypercapnia        Assessment/Plan:   Palliative encounter:  -Interval chart reviewed  My colleague Trupti Ames NP has been following patient (see ACP notes)  -Discussed in ICU IDT rounds this am. Patient had panic attack during rounds. Dr Meza was able to calm him after several mins. He remains on vent. He is calm at this time but bedside nurse reports he does have these panic attack episodes  -Discussed with Dr Meza  -Discussed patient's d/c to LTAC with Alyson HORTON Still awaiting insurance approval  -Discussed panic attacks with patient's primary Dr Zhao and he will place orders      Anxiety  -panic attack this am  -discussed with primary  -order for Ativan IV 0.5 mg q 4 hours prn placed by primary    Tracheostomy present  - was initially admitted with significant bleeding to trach site, an additional episode during admission; pt self-removed trach overnight/early AM 4/9-4/10 requiring RT to re-insert trach; length of time trach was removed unclear but no respiratory distress/need for resuscitation per documentation and verbal reports from MDT   - pt has frustrations with limited ability to verbalize/communicate; can become frustrated/anxious/dyspneic at times related to this; writes in a notebook often to support communication   - noted; management per hospital primary       * Acute on chronic respiratory failure with hypoxia and hypercapnia  - trach; now returned to ICU requiring vent; hypercapnic   - respiratory status greatly affected during periods of anxiety during admit   - noted; management per hospital  "primary and [facility]        NSTEMI (non-ST elevated myocardial infarction)  - Echo showed EF 60%, inferobasal hypokinesis, indeterminate diastolic function  - nitro drip weaned; pt denying any continued angina  - cardiac cath procedure on 3/23 and 3/27 with stent placements ; per cardiology poor CABG candidate   - continued concern for pt's tolerance/bleeding risk, but anticoagulation necessary give recent stent placements; cardiology following   - noted; management per hospital primary, and Cardiology        Squamous cell cancer of tongue  - Diagnosed 12/15/21 via FNA; Underwent total glossectomy, bilateral neck dissection, bilateral cervical facial flaps and anterolateral thigh flap reconstruction of glossectomy defect; completed adjuvant chemoradiation  - Followed by oncology and ENT outpt. No longer on chemo or radiation.   - has a trach since above treatment   - noted; management per hospital primary        PEG (percutaneous endoscopic gastrostomy) status  - PEG for feeding and meds; tube feeds typically well tolerated and no difficulty with home management by wife   - wife concerned of condition of PEG tube; original PEG tube in place; wife requesting GI consult to discuss while inpatient incase exchange is needed   - noted; management per hospital primary         Subjective:     Chief Complaint:   Chief Complaint   Patient presents with    Hemoptysis     Ems called to 74yo male that wife noticed was having blood y sputum in his trach on Wednesday. Went thursdsay to have a trach change and the dr was unale to scope his mouth above the trach due to him gagging but did change the trach. Continued to have the pink sputum until 0300 today and it had bright red blood from trach. Denied any pain or SOB       HPI:   From H&P: " Fareed WILLIAM Jose Manuel Porter. 73 y.o. 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  " "Per his wife 4 days ago he began to have pink tinged sputum via his trach.  He was seen by his ENT 2 days ago with a scope exam and a trach exchange without evidence of bleeding.  This morning at 3:00 a.m. he developed bright red blood via trach which resolved without intervention.  It is since not recurred.  He takes a daily aspirin.  He receives all medications via G-tube.  His tube feeding regimen consists of 6 cans of Isosource daily with 120 cc free water boluses.  He denies fever chest pain shortness of breath nausea vomiting abdominal pain leg swelling syncope dizziness dysuria melena hematuria hematemesis.     In the ED, hemoglobin 7.6 INR 1.0 BUN 50 creatinine 0.7  troponin negative BRCA negative O positive type and screen chest x-ray without detrimental change hypotensive to 85/54."     Palliative medicine consulted for goals of care and advance care planning; Met with pt at bedside; for details of visit, see advance care planning section of plan.         Hospital Course:  No notes on file    Interval History: patient had a panic attack this am and has since calmed       Medications:  Continuous Infusions:  Scheduled Meds:   acetylcysteine 100 mg/ml (10%)  4 mL Nebulization TID WAKE    albuterol-ipratropium  3 mL Nebulization Q4H    aspirin  81 mg Oral Daily    atorvastatin  80 mg Per G Tube Daily    ceFEPime (MAXIPIME) IVPB  2 g Intravenous Q8H    chlorhexidine  15 mL Mouth/Throat BID    clopidogreL  75 mg Oral Daily    famotidine (PF)  20 mg Intravenous BID    ferrous sulfate  1 tablet Per G Tube Daily    melatonin  9 mg Per G Tube Nightly    metoprolol tartrate  25 mg Per G Tube BID    senna-docusate 8.6-50 mg  2 tablet Per G Tube BID    sodium chloride 0.9%  10 mL Intravenous Q6H    sulfamethoxazole-trimethoprim 800-160mg  1 tablet Per G Tube BID     PRN Meds:acetaminophen, albuterol-ipratropium, atropine, bisacodyL, [] fentaNYL **FOLLOWED BY** fentaNYL, guaiFENesin 100 mg/5 " ml, hydrOXYzine HCL, insulin aspart U-100, loperamide, lorazepam, naloxone, nitroGLYCERIN, ondansetron, oxyCODONE, phenyleph-min oil-petrolatum, Flushing PICC Protocol **AND** sodium chloride 0.9% **AND** sodium chloride 0.9%    Objective:     Vital Signs (Most Recent):  Temp: 98.2 °F (36.8 °C) (04/14/23 0400)  Pulse: 85 (04/14/23 1147)  Resp: (!) 25.4 (04/14/23 1147)  BP: 109/64 (04/14/23 0600)  SpO2: 96 % (04/14/23 1147)   Vital Signs (24h Range):  Temp:  [98.2 °F (36.8 °C)-98.8 °F (37.1 °C)] 98.2 °F (36.8 °C)  Pulse:  [] 85  Resp:  [19-37.7] 25.4  SpO2:  [90 %-100 %] 96 %  BP: ()/(63-78) 109/64     Weight: 66.6 kg (146 lb 13.2 oz)  Body mass index is 22.32 kg/m².    Physical Exam  Vitals and nursing note reviewed.   Constitutional:       General: He is not in acute distress.     Appearance: He is ill-appearing. He is not toxic-appearing or diaphoretic.   HENT:      Head: Normocephalic and atraumatic.      Right Ear: External ear normal.      Left Ear: External ear normal.      Nose: Nose normal.   Cardiovascular:      Rate and Rhythm: Tachycardia present.   Pulmonary:      Comments: Trach to vent  Abdominal:      Comments: PEG   Neurological:      Mental Status: He is alert.      Motor: Weakness present.      Comments: Alert and oriented, able to nod and follow commands           Advance Care Planning   Advance Directives:   Goals of Care: What is most important right now is to focus on spending time at home, remaining as independent as possible, symptom/pain control, curative/life-prolongation (regardless of treatment burdens). Accordingly, we have decided that the best plan to meet the patient's goals includes continuing with treatment.       Significant Labs: All pertinent labs within the past 24 hours have been reviewed.  CBC:   Recent Labs   Lab 04/14/23  0346   WBC 6.85   HGB 8.9*   HCT 28.9*   MCV 97        BMP:  Recent Labs   Lab 04/14/23  0346   *      K 4.8   CL 97   CO2  32*   BUN 20   CREATININE 0.9   CALCIUM 8.4*     LFT:  Lab Results   Component Value Date    AST 30 04/14/2023    ALKPHOS 104 04/14/2023    BILITOT 0.5 04/14/2023     Albumin:   Albumin   Date Value Ref Range Status   04/14/2023 2.0 (L) 3.5 - 5.2 g/dL Final     Protein:   Total Protein   Date Value Ref Range Status   04/14/2023 5.8 (L) 6.0 - 8.4 g/dL Final     Lactic acid:   Lab Results   Component Value Date    LACTATE 0.5 04/11/2023    LACTATE 1.8 03/19/2023       Significant Imaging: None    >50% of 25 mins spent in chart review, face to face discussion, goals of care, emotional support, symptom assessment, coordination of care with other specialists and d/c planning.         Cindy Buckley NP  Palliative Medicine  [facility] [place] - Intensive Care

## 2023-04-14 NOTE — NURSING
[facility] [place]  Nurses Note -- 4 Eyes      4/14/2023       Skin assessed on: Q Shift      [x] No Pressure Injuries Present    [x]Prevention Measures Documented    [] Yes LDA  for Pressure Injury Previously documented     [] Yes New Pressure Injury Discovered   [] LDA for New Pressure Injury Added      Attending RN:  Xochitl Bah RN     Second RN:  KAMILAH White

## 2023-04-14 NOTE — NURSING
[facility] Intensive Care  ICU Shift Summary  Date: 4/14/2023      Prehospitalization: Home  Admit Date / LOS : 3/18/2023/ 26 days    Diagnosis: Acute on chronic respiratory failure with hypoxia and hypercapnia    Consults:        Active: OT, Palliative, PT, Pulm CC, and N/A, ENT       Needed: N/A     Code Status: Full Code   Advanced Directive: Not Received    LDA:  Lines/Drains/Airways       Peripherally Inserted Central Catheter Line  Duration             PICC Triple Lumen 03/20/23 2145 right basilic 24 days              Drain  Duration                  Gastrostomy/Enterostomy 01/04/22 Percutaneous endoscopic gastrostomy (PEG)  days    Male External Urinary Catheter 04/12/23 1900 1 day              Airway  Duration             Adult Surgical Airway 03/16/23 1014 Shiley Cuffed 6.0 29 days                  Central Lines/Site/Justification:Multiple GTTS  Urinary Cath/Order/Justification:Patient Does Not Have Urinary Catheter    Vasopressors/Infusions:        GOALS: Volume/ Hemodynamic: MAP >65                     RASS: 0  alert and calm    Pain Management: none       Pain Controlled: not applicable     Rhythm: NSR    Respiratory Device: Vent    Vent Mode: PS/CPAP  Oxygen Concentration (%):  [] 35  Vt Set:  [400 mL] 400 mL  PEEP/CPAP:  [5 cmH20-8 cmH20] 8 cmH20  Pressure Support:  [10 cmH20-15 cmH20] 15 cmH20  Mean Airway Pressure:  [6.5 atS81-56.6 cmH20] 13.4 cmH20                 Most Recent SBT/ SAT: Pass       MOVE Screen: PT Consult  ICU Liberation: not applicable    VTE Prophylaxis: Pharm and Mechanical  Mobility: Bedrest, S: Self, and A: Assist  Stress Ulcer Prophylaxis: Yes    Isolation: No active isolations    Dietary:   Current Diet Order   Procedures    Diet NPO Except for: Medication, Sips with Medication     Order Specific Question:   Except for     Answer:   Medication     Order Specific Question:   Except for     Answer:   Sips with Medication      Tolerance: yes  Advancement: @ goal    I  & O (24h):    Intake/Output Summary (Last 24 hours) at 4/14/2023 1704  Last data filed at 4/14/2023 1655  Gross per 24 hour   Intake 2749.02 ml   Output 1330 ml   Net 1419.02 ml        Restraints: No    Significant Dates:  Post Op Date: N/A  Rescue Date: N/A  Imaging/ Diagnostics: N/A    Noteworthy Labs:  N/A    COVID Test: (--)  CBC/Anemia Labs: Coags:    Recent Labs   Lab 04/13/23 0259 04/14/23  0346   WBC 7.25 6.85   HGB 9.0* 8.9*   HCT 29.3* 28.9*    224   MCV 99* 97   RDW 18.2* 18.0*    No results for input(s): PT, INR, APTT in the last 168 hours.     Chemistries:   Recent Labs   Lab 04/13/23 0259 04/14/23 0346    137   K 4.5 4.8    97   CO2 32* 32*   BUN 17 20   CREATININE 0.8 0.9   CALCIUM 8.7 8.4*   PROT 5.8* 5.8*   BILITOT 0.5 0.5   ALKPHOS 109 104   ALT 25 25   AST 34 30        Cardiac Enzymes: Ejection Fractions:    No results for input(s): CPK, CPKMB, MB, TROPONINI in the last 72 hours. EF   Date Value Ref Range Status   03/20/2023 60 % Final        POCT Glucose: HbA1c:    Recent Labs   Lab 04/13/23 2036 04/14/23  0834 04/14/23  1149   POCTGLUCOSE 135* 158* 123*    Hemoglobin A1C   Date Value Ref Range Status   09/27/2022 6.3 (H) 4.0 - 5.6 % Final     Comment:     ADA Screening Guidelines:  5.7-6.4%  Consistent with prediabetes  >or=6.5%  Consistent with diabetes    High levels of fetal hemoglobin interfere with the HbA1C  assay. Heterozygous hemoglobin variants (HbS, HgC, etc)do  not significantly interfere with this assay.   However, presence of multiple variants may affect accuracy.             ICU LOS 3d 7h  Level of Care: Critical Care    Chart Check: 12 HR Done  Shift Summary/Plan for the shift: See careplan note

## 2023-04-14 NOTE — ASSESSMENT & PLAN NOTE
Continue duonebs but not suspecting this as the primary  at this time.  - thick secretions have been an issue, adding cpt and mucomyst nebs to assist with thinning secretions for clearance.   - follow up sputum culture with pan sensitive pseudomonas

## 2023-04-14 NOTE — PLAN OF CARE
Problem: Adult Inpatient Plan of Care  Goal: Plan of Care Review  Outcome: Ongoing, Progressing  Goal: Patient-Specific Goal (Individualized)  Outcome: Ongoing, Progressing  Goal: Absence of Hospital-Acquired Illness or Injury  Outcome: Ongoing, Progressing  Goal: Optimal Comfort and Wellbeing  Outcome: Ongoing, Progressing  Goal: Readiness for Transition of Care  Outcome: Ongoing, Progressing     Problem: Fall Injury Risk  Goal: Absence of Fall and Fall-Related Injury  Outcome: Ongoing, Progressing     Problem: Infection  Goal: Absence of Infection Signs and Symptoms  Outcome: Ongoing, Progressing     Problem: Communication Impairment (Artificial Airway)  Goal: Effective Communication  Outcome: Ongoing, Progressing    Pt remains in ICU AAOX4 -On vent through trach. PS/CPAP mode, 35FIO2, 8.0 PEEP. Obturator @ HOB. Pt able to suction orally independently. VSS, NSR on the monitor. Afebrile. PEG tube noted to abdomen. Isosource 1.5 formula. Tube feeds on cont - 40ml@hr. CBG did not require insulin coverage. Condom cath in place. Pt able to assist in moving for position change. See notes for skin tears and interventions. Bath completed. Linen change performed. Wife visited today. TV on and call light within reach.

## 2023-04-14 NOTE — NURSING
Nurses Note -- 4 Eyes      4/14/2023   6:53 PM      Skin assessed during: Q Shift Change      [x] No Pressure Injuries Present    []Prevention Measures Documented      [] Yes- Altered Skin Integrity Present or Discovered   [] LDA Added if Not in Epic (Describe Wound)   [] New Altered Skin Integrity was Present on Admit and Documented in LDA   [] Wound Image Taken    Wound Care Consulted? No    Attending Nurse:  Cindy Good RN     Second RN/Staff Member:  Holden TRIANA

## 2023-04-14 NOTE — PLAN OF CARE
Parisa with [phone] [facility] says they will accept patient and that they talked with patient's spouse. [place] says they put in for PHN auth for [facility].  Most likely will receive auth Monday 4/172023but if not will call weekend .

## 2023-04-14 NOTE — PLAN OF CARE
Problem: Adult Inpatient Plan of Care  Goal: Plan of Care Review  Outcome: Ongoing, Not Progressing  Goal: Patient-Specific Goal (Individualized)  Outcome: Ongoing, Not Progressing  Goal: Absence of Hospital-Acquired Illness or Injury  Outcome: Ongoing, Not Progressing  Goal: Optimal Comfort and Wellbeing  Outcome: Ongoing, Not Progressing  Goal: Readiness for Transition of Care  Outcome: Ongoing, Not Progressing     Problem: Fall Injury Risk  Goal: Absence of Fall and Fall-Related Injury  Outcome: Ongoing, Not Progressing     Problem: Skin Injury Risk Increased  Goal: Skin Health and Integrity  Outcome: Ongoing, Not Progressing     Problem: Infection  Goal: Absence of Infection Signs and Symptoms  Outcome: Ongoing, Not Progressing     Problem: Coping Ineffective  Goal: Effective Coping  Outcome: Ongoing, Not Progressing     Problem: Impaired Wound Healing  Goal: Optimal Wound Healing  Outcome: Ongoing, Not Progressing     Problem: Communication Impairment (Artificial Airway)  Goal: Effective Communication  Outcome: Ongoing, Not Progressing     Problem: Device-Related Complication Risk (Artificial Airway)  Goal: Optimal Device Function  Outcome: Ongoing, Not Progressing     Problem: Skin and Tissue Injury (Artificial Airway)  Goal: Absence of Device-Related Skin or Tissue Injury  Outcome: Ongoing, Not Progressing     Problem: Aspiration (Enteral Nutrition)  Goal: Absence of Aspiration Signs and Symptoms  Outcome: Ongoing, Not Progressing     Problem: Device-Related Complication Risk (Enteral Nutrition)  Goal: Safe, Effective Therapy Delivery  Outcome: Ongoing, Not Progressing     Problem: Feeding Intolerance (Enteral Nutrition)  Goal: Feeding Tolerance  Outcome: Ongoing, Not Progressing     Problem: Mobility Impairment  Goal: Optimal Mobility  Outcome: Ongoing, Not Progressing     Problem: Fluid Imbalance (Pneumonia)  Goal: Fluid Balance  Outcome: Ongoing, Not Progressing     Problem: Infection (Pneumonia)  Goal:  Resolution of Infection Signs and Symptoms  Outcome: Ongoing, Not Progressing     Problem: Respiratory Compromise (Pneumonia)  Goal: Effective Oxygenation and Ventilation  Outcome: Ongoing, Not Progressing

## 2023-04-15 LAB
ALBUMIN SERPL BCP-MCNC: 1.8 G/DL (ref 3.5–5.2)
ALP SERPL-CCNC: 94 U/L (ref 55–135)
ALT SERPL W/O P-5'-P-CCNC: 19 U/L (ref 10–44)
ANION GAP SERPL CALC-SCNC: 7 MMOL/L (ref 8–16)
AST SERPL-CCNC: 23 U/L (ref 10–40)
BACTERIA BLD CULT: NORMAL
BACTERIA BLD CULT: NORMAL
BASOPHILS # BLD AUTO: 0.01 K/UL (ref 0–0.2)
BASOPHILS NFR BLD: 0.2 % (ref 0–1.9)
BILIRUB SERPL-MCNC: 0.4 MG/DL (ref 0.1–1)
BUN SERPL-MCNC: 20 MG/DL (ref 8–23)
CALCIUM SERPL-MCNC: 8.5 MG/DL (ref 8.7–10.5)
CHLORIDE SERPL-SCNC: 97 MMOL/L (ref 95–110)
CO2 SERPL-SCNC: 28 MMOL/L (ref 23–29)
CREAT SERPL-MCNC: 0.9 MG/DL (ref 0.5–1.4)
DIFFERENTIAL METHOD: ABNORMAL
EOSINOPHIL # BLD AUTO: 0.2 K/UL (ref 0–0.5)
EOSINOPHIL NFR BLD: 2.7 % (ref 0–8)
ERYTHROCYTE [DISTWIDTH] IN BLOOD BY AUTOMATED COUNT: 18.1 % (ref 11.5–14.5)
EST. GFR  (NO RACE VARIABLE): >60 ML/MIN/1.73 M^2
GLUCOSE SERPL-MCNC: 115 MG/DL (ref 70–110)
HCT VFR BLD AUTO: 26.5 % (ref 40–54)
HGB BLD-MCNC: 8.2 G/DL (ref 14–18)
IMM GRANULOCYTES # BLD AUTO: 0.03 K/UL (ref 0–0.04)
IMM GRANULOCYTES NFR BLD AUTO: 0.5 % (ref 0–0.5)
LYMPHOCYTES # BLD AUTO: 0.3 K/UL (ref 1–4.8)
LYMPHOCYTES NFR BLD: 4.9 % (ref 18–48)
MCH RBC QN AUTO: 29.9 PG (ref 27–31)
MCHC RBC AUTO-ENTMCNC: 30.9 G/DL (ref 32–36)
MCV RBC AUTO: 97 FL (ref 82–98)
MONOCYTES # BLD AUTO: 0.7 K/UL (ref 0.3–1)
MONOCYTES NFR BLD: 11 % (ref 4–15)
NEUTROPHILS # BLD AUTO: 4.8 K/UL (ref 1.8–7.7)
NEUTROPHILS NFR BLD: 80.7 % (ref 38–73)
NRBC BLD-RTO: 0 /100 WBC
PLATELET # BLD AUTO: 227 K/UL (ref 150–450)
PMV BLD AUTO: 10.1 FL (ref 9.2–12.9)
POCT GLUCOSE: 144 MG/DL (ref 70–110)
POTASSIUM SERPL-SCNC: 4.7 MMOL/L (ref 3.5–5.1)
PROT SERPL-MCNC: 5.5 G/DL (ref 6–8.4)
RBC # BLD AUTO: 2.74 M/UL (ref 4.6–6.2)
SODIUM SERPL-SCNC: 132 MMOL/L (ref 136–145)
WBC # BLD AUTO: 5.92 K/UL (ref 3.9–12.7)

## 2023-04-15 PROCEDURE — 25000003 PHARM REV CODE 250: Performed by: HOSPITALIST

## 2023-04-15 PROCEDURE — 27000221 HC OXYGEN, UP TO 24 HOURS

## 2023-04-15 PROCEDURE — 99900026 HC AIRWAY MAINTENANCE (STAT)

## 2023-04-15 PROCEDURE — 80053 COMPREHEN METABOLIC PANEL: CPT | Performed by: HOSPITALIST

## 2023-04-15 PROCEDURE — 94761 N-INVAS EAR/PLS OXIMETRY MLT: CPT

## 2023-04-15 PROCEDURE — 99291 PR CRITICAL CARE, E/M 30-74 MINUTES: ICD-10-PCS | Mod: ,,, | Performed by: INTERNAL MEDICINE

## 2023-04-15 PROCEDURE — 99291 CRITICAL CARE FIRST HOUR: CPT | Mod: ,,, | Performed by: INTERNAL MEDICINE

## 2023-04-15 PROCEDURE — 94640 AIRWAY INHALATION TREATMENT: CPT

## 2023-04-15 PROCEDURE — 36415 COLL VENOUS BLD VENIPUNCTURE: CPT | Performed by: HOSPITALIST

## 2023-04-15 PROCEDURE — A4216 STERILE WATER/SALINE, 10 ML: HCPCS | Performed by: HOSPITALIST

## 2023-04-15 PROCEDURE — 94668 MNPJ CHEST WALL SBSQ: CPT

## 2023-04-15 PROCEDURE — 20000000 HC ICU ROOM

## 2023-04-15 PROCEDURE — 25000242 PHARM REV CODE 250 ALT 637 W/ HCPCS: Performed by: INTERNAL MEDICINE

## 2023-04-15 PROCEDURE — 63600175 PHARM REV CODE 636 W HCPCS: Performed by: HOSPITALIST

## 2023-04-15 PROCEDURE — 63600175 PHARM REV CODE 636 W HCPCS: Performed by: STUDENT IN AN ORGANIZED HEALTH CARE EDUCATION/TRAINING PROGRAM

## 2023-04-15 PROCEDURE — 25000242 PHARM REV CODE 250 ALT 637 W/ HCPCS: Performed by: HOSPITALIST

## 2023-04-15 PROCEDURE — 99900035 HC TECH TIME PER 15 MIN (STAT)

## 2023-04-15 PROCEDURE — 94003 VENT MGMT INPAT SUBQ DAY: CPT

## 2023-04-15 PROCEDURE — 85025 COMPLETE CBC W/AUTO DIFF WBC: CPT | Performed by: HOSPITALIST

## 2023-04-15 RX ORDER — ACETYLCYSTEINE 100 MG/ML
4 SOLUTION ORAL; RESPIRATORY (INHALATION) EVERY 8 HOURS
Status: DISCONTINUED | OUTPATIENT
Start: 2023-04-15 | End: 2023-04-30 | Stop reason: HOSPADM

## 2023-04-15 RX ADMIN — CHLORHEXIDINE GLUCONATE 0.12% ORAL RINSE 15 ML: 1.2 LIQUID ORAL at 09:04

## 2023-04-15 RX ADMIN — MELATONIN TAB 3 MG 9 MG: 3 TAB at 09:04

## 2023-04-15 RX ADMIN — LORAZEPAM 0.5 MG: 2 INJECTION INTRAMUSCULAR; INTRAVENOUS at 03:04

## 2023-04-15 RX ADMIN — ASPIRIN 81 MG CHEWABLE TABLET 81 MG: 81 TABLET CHEWABLE at 09:04

## 2023-04-15 RX ADMIN — FERROUS SULFATE TAB 325 MG (65 MG ELEMENTAL FE) 1 EACH: 325 (65 FE) TAB at 09:04

## 2023-04-15 RX ADMIN — SULFAMETHOXAZOLE AND TRIMETHOPRIM 1 TABLET: 800; 160 TABLET ORAL at 09:04

## 2023-04-15 RX ADMIN — ACETYLCYSTEINE 4 ML: 100 SOLUTION ORAL; RESPIRATORY (INHALATION) at 03:04

## 2023-04-15 RX ADMIN — IPRATROPIUM BROMIDE AND ALBUTEROL SULFATE 3 ML: 2.5; .5 SOLUTION RESPIRATORY (INHALATION) at 12:04

## 2023-04-15 RX ADMIN — ACETYLCYSTEINE 4 ML: 100 SOLUTION ORAL; RESPIRATORY (INHALATION) at 11:04

## 2023-04-15 RX ADMIN — FAMOTIDINE 20 MG: 10 INJECTION, SOLUTION INTRAVENOUS at 09:04

## 2023-04-15 RX ADMIN — IPRATROPIUM BROMIDE AND ALBUTEROL SULFATE 3 ML: 2.5; .5 SOLUTION RESPIRATORY (INHALATION) at 07:04

## 2023-04-15 RX ADMIN — IPRATROPIUM BROMIDE AND ALBUTEROL SULFATE 3 ML: 2.5; .5 SOLUTION RESPIRATORY (INHALATION) at 04:04

## 2023-04-15 RX ADMIN — CLOPIDOGREL BISULFATE 75 MG: 75 TABLET ORAL at 09:04

## 2023-04-15 RX ADMIN — METOPROLOL TARTRATE 25 MG: 25 TABLET, FILM COATED ORAL at 09:04

## 2023-04-15 RX ADMIN — ACETYLCYSTEINE 4 ML: 100 SOLUTION ORAL; RESPIRATORY (INHALATION) at 07:04

## 2023-04-15 RX ADMIN — SENNOSIDES AND DOCUSATE SODIUM 2 TABLET: 50; 8.6 TABLET ORAL at 09:04

## 2023-04-15 RX ADMIN — CEFEPIME HYDROCHLORIDE 2 G: 2 INJECTION, SOLUTION INTRAVENOUS at 04:04

## 2023-04-15 RX ADMIN — Medication 10 ML: at 12:04

## 2023-04-15 RX ADMIN — IPRATROPIUM BROMIDE AND ALBUTEROL SULFATE 3 ML: 2.5; .5 SOLUTION RESPIRATORY (INHALATION) at 11:04

## 2023-04-15 RX ADMIN — IPRATROPIUM BROMIDE AND ALBUTEROL SULFATE 3 ML: 2.5; .5 SOLUTION RESPIRATORY (INHALATION) at 03:04

## 2023-04-15 RX ADMIN — Medication 10 ML: at 06:04

## 2023-04-15 RX ADMIN — CEFEPIME HYDROCHLORIDE 2 G: 2 INJECTION, SOLUTION INTRAVENOUS at 09:04

## 2023-04-15 RX ADMIN — LORAZEPAM 0.5 MG: 2 INJECTION INTRAMUSCULAR; INTRAVENOUS at 07:04

## 2023-04-15 RX ADMIN — ATORVASTATIN CALCIUM 80 MG: 40 TABLET, FILM COATED ORAL at 09:04

## 2023-04-15 RX ADMIN — CEFEPIME HYDROCHLORIDE 2 G: 2 INJECTION, SOLUTION INTRAVENOUS at 12:04

## 2023-04-15 RX ADMIN — OXYCODONE HYDROCHLORIDE 5 MG: 5 TABLET ORAL at 04:04

## 2023-04-15 NOTE — SUBJECTIVE & OBJECTIVE
Interval History:   No events overnight.   Improving overall, since last decompensation  No new complaints    Review of Systems   Constitutional:  Positive for activity change and fatigue.   Cardiovascular:  Negative for chest pain.   Gastrointestinal:  Negative for abdominal pain.     Objective:     Vital Signs (Most Recent):  Temp: 98.2 °F (36.8 °C) (04/15/23 0400)  Pulse: 108 (04/15/23 0600)  Resp: 18.5 (04/15/23 0600)  BP: 101/67 (04/15/23 0600)  SpO2: 98 % (04/15/23 0600)   Vital Signs (24h Range):  Temp:  [97.8 °F (36.6 °C)-98.7 °F (37.1 °C)] 98.2 °F (36.8 °C)  Pulse:  [] 108  Resp:  [9.6-31.7] 18.5  SpO2:  [71 %-100 %] 98 %  BP: ()/(55-69) 101/67     Weight: 66.6 kg (146 lb 13.2 oz)  Body mass index is 22.32 kg/m².    Intake/Output Summary (Last 24 hours) at 4/15/2023 0700  Last data filed at 4/15/2023 0600  Gross per 24 hour   Intake 2229.94 ml   Output 1845 ml   Net 384.94 ml        Physical Exam  Vitals reviewed.   Constitutional:       General: He is not in acute distress.     Appearance: He is well-developed and normal weight. He is ill-appearing. He is not toxic-appearing or diaphoretic.   HENT:      Head: Normocephalic and atraumatic.   Eyes:      General: No scleral icterus.     Pupils: Pupils are equal, round, and reactive to light.   Neck:      Thyroid: No thyromegaly.   Cardiovascular:      Rate and Rhythm: Normal rate and regular rhythm.      Heart sounds: No murmur heard.    No gallop.   Pulmonary:      Effort: Pulmonary effort is normal. No respiratory distress.      Breath sounds: Normal breath sounds. No stridor.   Abdominal:      General: Bowel sounds are normal. There is no distension.      Palpations: Abdomen is soft.      Tenderness: There is no abdominal tenderness.   Musculoskeletal:         General: No deformity. Normal range of motion.      Cervical back: Normal range of motion.   Skin:     General: Skin is warm.      Capillary Refill: Capillary refill takes less than 2  seconds.      Coloration: Skin is not jaundiced.      Findings: No bruising.   Neurological:      Mental Status: He is alert and oriented to person, place, and time.      Motor: Weakness present.      Gait: Gait abnormal.   Psychiatric:         Mood and Affect: Mood normal.         Behavior: Behavior normal.           Recent Results (from the past 24 hour(s))   POCT glucose    Collection Time: 04/14/23  8:34 AM   Result Value Ref Range    POCT Glucose 158 (H) 70 - 110 mg/dL   POCT glucose    Collection Time: 04/14/23 11:49 AM   Result Value Ref Range    POCT Glucose 123 (H) 70 - 110 mg/dL   POCT glucose    Collection Time: 04/14/23  4:58 PM   Result Value Ref Range    POCT Glucose 148 (H) 70 - 110 mg/dL   POCT glucose    Collection Time: 04/14/23  8:10 PM   Result Value Ref Range    POCT Glucose 141 (H) 70 - 110 mg/dL   CBC Auto Differential    Collection Time: 04/15/23  2:25 AM   Result Value Ref Range    WBC 5.92 3.90 - 12.70 K/uL    RBC 2.74 (L) 4.60 - 6.20 M/uL    Hemoglobin 8.2 (L) 14.0 - 18.0 g/dL    Hematocrit 26.5 (L) 40.0 - 54.0 %    MCV 97 82 - 98 fL    MCH 29.9 27.0 - 31.0 pg    MCHC 30.9 (L) 32.0 - 36.0 g/dL    RDW 18.1 (H) 11.5 - 14.5 %    Platelets 227 150 - 450 K/uL    MPV 10.1 9.2 - 12.9 fL    Immature Granulocytes 0.5 0.0 - 0.5 %    Gran # (ANC) 4.8 1.8 - 7.7 K/uL    Immature Grans (Abs) 0.03 0.00 - 0.04 K/uL    Lymph # 0.3 (L) 1.0 - 4.8 K/uL    Mono # 0.7 0.3 - 1.0 K/uL    Eos # 0.2 0.0 - 0.5 K/uL    Baso # 0.01 0.00 - 0.20 K/uL    nRBC 0 0 /100 WBC    Gran % 80.7 (H) 38.0 - 73.0 %    Lymph % 4.9 (L) 18.0 - 48.0 %    Mono % 11.0 4.0 - 15.0 %    Eosinophil % 2.7 0.0 - 8.0 %    Basophil % 0.2 0.0 - 1.9 %    Differential Method Automated    Comprehensive Metabolic Panel    Collection Time: 04/15/23  2:25 AM   Result Value Ref Range    Sodium 132 (L) 136 - 145 mmol/L    Potassium 4.7 3.5 - 5.1 mmol/L    Chloride 97 95 - 110 mmol/L    CO2 28 23 - 29 mmol/L    Glucose 115 (H) 70 - 110 mg/dL    BUN 20 8 -  "23 mg/dL    Creatinine 0.9 0.5 - 1.4 mg/dL    Calcium 8.5 (L) 8.7 - 10.5 mg/dL    Total Protein 5.5 (L) 6.0 - 8.4 g/dL    Albumin 1.8 (L) 3.5 - 5.2 g/dL    Total Bilirubin 0.4 0.1 - 1.0 mg/dL    Alkaline Phosphatase 94 55 - 135 U/L    AST 23 10 - 40 U/L    ALT 19 10 - 44 U/L    Anion Gap 7 (L) 8 - 16 mmol/L    eGFR >60 >60 mL/min/1.73 m^2       Microbiology Results (last 7 days)       Procedure Component Value Units Date/Time    Blood culture [[id number]] Collected: 04/11/23 1240    Order Status: Completed Specimen: Blood from Antecubital, Left Arm Updated: 04/14/23 1503     Blood Culture, Routine No Growth to date      No Growth to date      No Growth to date      No Growth to date    Narrative:      hard stick    Blood culture [[id number]] Collected: 04/11/23 1231    Order Status: Completed Specimen: Blood Updated: 04/14/23 1503     Blood Culture, Routine No Growth to date      No Growth to date      No Growth to date      No Growth to date    Culture, Respiratory with Gram Stain [[id number]]  (Abnormal)  (Susceptibility) Collected: 04/11/23 1438    Order Status: Completed Specimen: Respiratory from Tracheal Aspirate Updated: 04/14/23 1238     Respiratory Culture PSEUDOMONAS AERUGINOSA  Moderate       Gram Stain (Respiratory) Rare WBC's     Gram Stain (Respiratory) Many Gram positive rods     Gram Stain (Respiratory) Rare Gram positive cocci in pairs             Imaging Results              X-Ray Chest AP Portable (Final result)  Result time 03/18/23 12:01:13      Final result by Mariano Soto MD (03/18/23 12:01:13)                   Impression:      No detrimental change when compared with 12/29/2022.      Electronically signed by: Mariano Soto MD  Date:    03/18/2023  Time:    12:01               Narrative:    EXAMINATION:  XR CHEST AP PORTABLE    CLINICAL HISTORY:  Provided history is "chest pain;  ".    TECHNIQUE:  One view of the chest.    COMPARISON:  12/29/2022.    FINDINGS:  Tracheostomy tube is " present with the tip overlying the tracheal air column between the clavicular heads and sherine.  Cardiac silhouette is borderline enlarged and similar to prior study.  Atherosclerotic calcifications overlie the aortic arch.  Probable pulmonary emphysema.  Coarsened interstitial lung markings with bibasilar subsegmental atelectasis.  Small left and trace right pleural effusions, similar to the prior study.  No pneumothorax.  Aeration is overall similar when compared with 12/29/2022. Probable percutaneous gastrostomy tube overlies the left upper quadrant of the abdomen.

## 2023-04-15 NOTE — PROGRESS NOTES
"[facility]ensive [facility]  Critical Care Medicine  Progress Note    Patient Name: Fareed Richard Jr.  MRN: [id number]  Admission Date: 3/18/2023  Hospital Length of Stay: 27 days  Code Status: Full Code  Attending Provider: Andry Zhao MD  Primary Care Provider: Kodi Tubbs MD   Principal Problem: Acute on chronic respiratory failure with hypoxia and hypercapnia    Subjective:     HPI:  Asked to evaluate for "hypotension and bleeding from trach site (replaced a few days ago)".    He was seen in ENT clinic by Dr. Smalls on 3/16 for routinely scheduled follow up and trach change.  He was having some thick yellow secretions with perhaps a hint of pink from the trach at time even before the trach exchange.  Subsequently, he was noted to have increase in congestion with more bloody-looking sputum.  He denies fever/chills/night sweats but has been more congested with dyspnea noted.  The day of presentation to the ED (3/18), secretions were frankly bloody => so he came to the ED.  He denies prior similar episodes.  He is on chronic aspirin and Plavix due to severe vascular disease with past stenting.    Review of past chest CT from October 2022 shows severe peripheral bullous changes, as well as centrilobular emphysema changes.  There is a modest left pleural effusion and left upper lobe nodular opacity.  There is bronchiectasis and diffuse micronodular changes in a tree-in-bud configuration to suggest chronic small airway inflammation/infection.  Chest CT at this time shows similar findings but now shows increased bibasilar air-space disease with consolidation/air bronchograms consistent with pneumonia.  Procalcitonin is normal.  Troponin has bumped up since admission to suggest possible demand ischemia in the setting of worsening anemia and hypotension.        Patient previously seen in Pulmonary Clinic by Dr. Wadsworth in October 2022.  From Dr. Wadsworth's note:    Fareed Richard Jr. is a 72 y.o. male who presents for " evaluation of chronic hypoxemic respiratory failure. He coughs daily, particularly in the evening. The cough is productive of yellow mucus which he expectorates without difficult via his trach. He did not experience chronic cough prior to his trach. He experiences chronic dyspnea on exertion. He takes wixela inhaler 1 puff BID (still inhales through his mouth) & an albuterol nebulizer. He has been taking prednisone for the past six weeks (for copd?). He takes glycopyrrolate due to constant oral secretions.      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 S/p tracheostomy, neck dissection, & total glossectomy January 2022.     HeSince his last visit he was hospitalized for acute respiratory failure requiring intubation and had an extended hospital course; admitted 4/19/22 and discharged 5/12/22.  3 month post-treatment PET CT reviewed; no evidence of local residual or recurrent disease.  Pulmonary findings likely reflect sequelae of recent hospitalization.  Concern for active inflammation going on; agree with starting steroids and getting PFTs.  Will need repeat CT chest in 8 weeks and follow up with pulmonology.     PMH: CAD status post RCA PCI in 2000, carotid stenosis status post carotid enterectomy in 2018, PAD status post bilateral femoral atherectomy and hypertension.     He is a former 2PPD smoker; he quit six years ago.        Hospital/ICU Course:  No notes on file    Interval History: Mr. Richard has continued to do well. No issues overnight.       Objective:     Vital Signs (Most Recent):  Temp: 97.5 °F (36.4 °C) (04/15/23 0900)  Pulse: 88 (04/15/23 1144)  Resp: 15 (04/15/23 1144)  BP: 100/67 (04/15/23 1100)  SpO2: 98 % (04/15/23 1144) Vital Signs (24h Range):  Temp:  [97.5 °F (36.4 °C)-98.7 °F (37.1 °C)] 97.5 °F (36.4 °C)  Pulse:  [] 88  Resp:  [9.6-27.6] 15  SpO2:  [71 %-100 %] 98 %  BP: ()/(55-69) 100/67     Weight: 66.6 kg (146 lb 13.2 oz)  Body mass index  is 22.32 kg/m².      Intake/Output Summary (Last 24 hours) at 4/15/2023 1247  Last data filed at 4/15/2023 1245  Gross per 24 hour   Intake 2179.94 ml   Output 1790 ml   Net 389.94 ml       Physical Exam  Vitals reviewed.   Constitutional:       Appearance: He is ill-appearing (chronically ill). He is not toxic-appearing.   HENT:      Head: Normocephalic and atraumatic.      Nose: Nose normal. No congestion.      Mouth/Throat:      Mouth: Mucous membranes are moist.      Pharynx: Oropharynx is clear. No oropharyngeal exudate.   Eyes:      Extraocular Movements: Extraocular movements intact.      Conjunctiva/sclera: Conjunctivae normal.      Pupils: Pupils are equal, round, and reactive to light.   Neck:      Comments: Cuffed 8 trach   Cardiovascular:      Rate and Rhythm: Normal rate and regular rhythm.      Pulses: Normal pulses.      Heart sounds: No murmur heard.  Pulmonary:      Effort: Tachypnea present. No respiratory distress.      Breath sounds: No wheezing or rhonchi.      Comments: Increased respiratory effort when PS is weaned.   Abdominal:      General: Abdomen is flat. Bowel sounds are normal.      Palpations: Abdomen is soft.   Musculoskeletal:         General: No swelling.   Skin:     General: Skin is warm and dry.      Capillary Refill: Capillary refill takes less than 2 seconds.   Neurological:      General: No focal deficit present.      Mental Status: He is oriented to person, place, and time.      Comments: Not responding to commands or voice      Review of Systems    Vents:  Vent Mode: PS/CPAP (04/15/23 1144)  Ventilator Initiated: Yes (04/11/23 0936)  Set Rate: 24 BPM (04/14/23 0821)  Vt Set: 400 mL (04/14/23 0821)  Pressure Support: 15 cmH20 (04/15/23 1144)  PEEP/CPAP: 8 cmH20 (04/15/23 1144)  Oxygen Concentration (%): 35 (04/15/23 1144)  Peak Airway Pressure: 23.4 cmH20 (04/15/23 1144)  Total Ve: 12.78 L/m (04/15/23 1144)  F/VT Ratio<105 (RSBI): (!) 47.11 (04/15/23  1144)    Lines/Drains/Airways       Peripherally Inserted Central Catheter Line  Duration             PICC Triple Lumen 03/20/23 2145 right basilic 25 days              Drain  Duration                  Gastrostomy/Enterostomy 01/04/22 Percutaneous endoscopic gastrostomy (PEG)  days    Male External Urinary Catheter 04/12/23 1900 2 days              Airway  Duration             Adult Surgical Airway 03/16/23 1014 Shiley Cuffed 6.0 30 days                    Significant Labs:    CBC/Anemia Profile:  Recent Labs   Lab 04/14/23  0346 04/15/23  0225   WBC 6.85 5.92   HGB 8.9* 8.2*   HCT 28.9* 26.5*    227   MCV 97 97   RDW 18.0* 18.1*        Chemistries:  Recent Labs   Lab 04/14/23 0346 04/15/23  0225    132*   K 4.8 4.7   CL 97 97   CO2 32* 28   BUN 20 20   CREATININE 0.9 0.9   CALCIUM 8.4* 8.5*   ALBUMIN 2.0* 1.8*   PROT 5.8* 5.5*   BILITOT 0.5 0.4   ALKPHOS 104 94   ALT 25 19   AST 30 23       All pertinent labs within the past 24 hours have been reviewed.    Significant Imaging:  I have reviewed all pertinent imaging results/findings within the past 24 hours.      ABG  Recent Labs   Lab 04/11/23  1030   PH 7.224*   PO2 56   PCO2 98.2*   HCO3 40.6*   BE 10     Assessment/Plan:     ENT  Tracheostomy present  Tracheostomy in place since surgical resection for head/neck cancer in January 2022.  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    No signs of ongoing bleeding at the moment     Pulmonary  * Acute on chronic respiratory failure with hypoxia and hypercapnia  Vent Mode: PS/CPAP  Oxygen Concentration (%):  [35] 35  Resp Rate Total:  [21 br/min] 21 br/min  PEEP/CPAP:  [8 cmH20] 8 cmH20  Pressure Support:  [15 ssG77-56 cmH20] 15 cmH20  Mean Airway Pressure:  [12.3 nbP56-49.6 cmH20] 16.4 cmH20  Placed back on the ventilator for hypercapnea  - hypoxia and hypercapnea - weaning O2 for goal sat of 88% and above  - COPD  treatment as above  - sputum culture- with pseudomonas  - improving    Hemoptysis  Likely secondary to lower respiratory tract infection in the setting of ASA/Plavix.  It seems less likely to be physical complication of the tracheostomy tube itself.    · Antibiotics for pneumonia.  · Check sputum for culture, including AFB for possible MAC infection.  · Respiratory viral panel.  · OK to resume anti-platelet medications; monitor for bleeding   · No need for additional nebulized TXA.  No bleeding since 3/19  · Appreciate ENT evaluation     COPD exacerbation  Continue duonebs but not suspecting this as the primary  at this time.  - thick secretions have been an issue, adding cpt and mucomyst nebs to assist with thinning secretions for clearance.   - follow up sputum culture with pan sensitive pseudomonas       Critical Care Time: 35 minutes  Critical secondary to Patient has a condition that poses threat to life and bodily function: Severe Respiratory Distress      Critical care was time spent personally by me on the following activities: development of treatment plan with patient or surrogate and bedside caregivers, discussions with consultants, evaluation of patient's response to treatment, examination of patient, ordering and performing treatments and interventions, ordering and review of laboratory studies, ordering and review of radiographic studies, pulse oximetry, re-evaluation of patient's condition. This critical care time did not overlap with that of any other provider or involve time for any procedures.     Alessandra Meza MD  Critical Care Medicine  [facility] [place] [facility]

## 2023-04-15 NOTE — PROGRESS NOTES
[facility] Medicine  Progress Note    Patient Name: Fareed Richard Jr.  MRN: [id number]  Patient Class: IP- Inpatient   Admission Date: 3/18/2023  Length of Stay: 27 days  Attending Physician: Andry Zhao MD  Primary Care Provider: Kodi Tubbs MD        Subjective:     Principal Problem:Acute on chronic respiratory failure with hypoxia and hypercapnia        HPI:  Fareed Richard Jr. 73 y.o. 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  Per his wife 4 days ago he began to have pink tinged sputum via his trach.  He was seen by his ENT 2 days ago with a scope exam and a trach exchange without evidence of bleeding.  This morning at 3:00 a.m. he developed bright red blood via trach which resolved without intervention.  It is since not recurred.  He takes a daily aspirin.  He receives all medications via G-tube.  His tube feeding regimen consists of 6 cans of Isosource daily with 120 cc free water boluses.  He denies fever chest pain shortness of breath nausea vomiting abdominal pain leg swelling syncope dizziness dysuria melena hematuria hematemesis.    In the ED, hemoglobin 7.6 INR 1.0 BUN 50 creatinine 0.7  troponin negative BRCA negative O positive type and screen chest x-ray without detrimental change hypotensive to 85/54.      Overview/Hospital Course:  72 yo M w squamous CA of tongue s/p glossectomy and reconstructive flap with trach, CAD, chronic hypoxic respiratory failure (on 5L at home) and with peg presented for evaluation of hemoptysis 2 days after tracheostomy change in clinic. Transferred to ICU 3/19 when markedly hypotensive.  Unclear if due to hemorrhage, cardiogenic or septic shock.  Did require PRBC and TXA nebs but didn't seem like sufficient bleeding to cause shock.  Bleeding stopped and ENT following.  CTA shows either significant mucus plugging  or bibasilar pneumonia - pulmonology favored pneumonia.  Trach aspirate with Stenotrophomonas, Achromobacter, and Candida. Also urine culture growing Enterococcus.  He is on broad spectrum antibiotics including bactrim. Developed NSTEMI. Cardiology consulted. [facility] 3/23 which showed distal LM 30%, mild LAD 90% bifurcation lesion with D1, Cx mid 80%, RCA moderate diffuse disease with long 70% and some left to right collateral. All vessels heavily calcified. Not a candidate for CABG but plan for staged PCI. Continued on heparin drip, asa/plavix. Vasopressors weaned. [facility] 3/27 PCI to LAD and LCx. Post operatively he was hemodynamically unstable and anemic. Stat CTA showed RP hematoma near L kidney without acute bleeding. Pt transfused supportively. He improved over the next few days. Stepped down to floor. Remains on trach collar, higher than home O2 requirements. He has delirium as well and poor sleep. Attempted trazodone. The following morning, he was much more lethargic and was transferred to ICU for worsening acute on chronic hypoxic/hypercapnic respiratory failure. This improved after 1 day on the vent.     Trach aspirate with GNR; remains on cefepime in addition to bactrim for prior Stenotrophomonas. Improving at 98%, wean O2. Appears Steno and Achro being effectively treated with bactrim, now growing pseudomonas and has been on cefepime, improving. Per ICU discussion, wife now more understanding he needs LTAC placement.       Interval History:   NAEON.  No new issues.   Denies complaints.      Review of Systems   Constitutional:  Positive for activity change and fatigue.   Cardiovascular:  Negative for chest pain.   Gastrointestinal:  Negative for abdominal pain.     Objective:     Vital Signs (Most Recent):  Temp: 98.2 °F (36.8 °C) (04/15/23 0400)  Pulse: 108 (04/15/23 0600)  Resp: 18.5 (04/15/23 0600)  BP: 101/67 (04/15/23 0600)  SpO2: 98 % (04/15/23 0600)   Vital Signs (24h Range):  Temp:  [97.8 °F (36.6  °C)-98.7 °F (37.1 °C)] 98.2 °F (36.8 °C)  Pulse:  [] 108  Resp:  [9.6-31.7] 18.5  SpO2:  [71 %-100 %] 98 %  BP: ()/(55-69) 101/67     Weight: 66.6 kg (146 lb 13.2 oz)  Body mass index is 22.32 kg/m².    Intake/Output Summary (Last 24 hours) at 4/15/2023 0700  Last data filed at 4/15/2023 0600  Gross per 24 hour   Intake 2229.94 ml   Output 1845 ml   Net 384.94 ml        Physical Exam  Vitals reviewed.   Constitutional:       General: He is not in acute distress.     Appearance: He is well-developed and normal weight. He is ill-appearing. He is not toxic-appearing or diaphoretic.   HENT:      Head: Normocephalic and atraumatic.   Eyes:      General: No scleral icterus.     Pupils: Pupils are equal, round, and reactive to light.   Neck:      Thyroid: No thyromegaly.   Cardiovascular:      Rate and Rhythm: Normal rate and regular rhythm.      Heart sounds: No murmur heard.    No gallop.   Pulmonary:      Effort: Pulmonary effort is normal. No respiratory distress.      Breath sounds: Normal breath sounds. No stridor.   Abdominal:      General: Bowel sounds are normal. There is no distension.      Palpations: Abdomen is soft.      Tenderness: There is no abdominal tenderness.   Musculoskeletal:         General: No deformity. Normal range of motion.      Cervical back: Normal range of motion.   Skin:     General: Skin is warm.      Capillary Refill: Capillary refill takes less than 2 seconds.      Coloration: Skin is not jaundiced.      Findings: No bruising.   Neurological:      Mental Status: He is alert and oriented to person, place, and time.      Motor: Weakness present.      Gait: Gait abnormal.   Psychiatric:         Mood and Affect: Mood normal.         Behavior: Behavior normal.           Recent Results (from the past 24 hour(s))   POCT glucose    Collection Time: 04/14/23  8:34 AM   Result Value Ref Range    POCT Glucose 158 (H) 70 - 110 mg/dL   POCT glucose    Collection Time: 04/14/23 11:49 AM    Result Value Ref Range    POCT Glucose 123 (H) 70 - 110 mg/dL   POCT glucose    Collection Time: 04/14/23  4:58 PM   Result Value Ref Range    POCT Glucose 148 (H) 70 - 110 mg/dL   POCT glucose    Collection Time: 04/14/23  8:10 PM   Result Value Ref Range    POCT Glucose 141 (H) 70 - 110 mg/dL   CBC Auto Differential    Collection Time: 04/15/23  2:25 AM   Result Value Ref Range    WBC 5.92 3.90 - 12.70 K/uL    RBC 2.74 (L) 4.60 - 6.20 M/uL    Hemoglobin 8.2 (L) 14.0 - 18.0 g/dL    Hematocrit 26.5 (L) 40.0 - 54.0 %    MCV 97 82 - 98 fL    MCH 29.9 27.0 - 31.0 pg    MCHC 30.9 (L) 32.0 - 36.0 g/dL    RDW 18.1 (H) 11.5 - 14.5 %    Platelets 227 150 - 450 K/uL    MPV 10.1 9.2 - 12.9 fL    Immature Granulocytes 0.5 0.0 - 0.5 %    Gran # (ANC) 4.8 1.8 - 7.7 K/uL    Immature Grans (Abs) 0.03 0.00 - 0.04 K/uL    Lymph # 0.3 (L) 1.0 - 4.8 K/uL    Mono # 0.7 0.3 - 1.0 K/uL    Eos # 0.2 0.0 - 0.5 K/uL    Baso # 0.01 0.00 - 0.20 K/uL    nRBC 0 0 /100 WBC    Gran % 80.7 (H) 38.0 - 73.0 %    Lymph % 4.9 (L) 18.0 - 48.0 %    Mono % 11.0 4.0 - 15.0 %    Eosinophil % 2.7 0.0 - 8.0 %    Basophil % 0.2 0.0 - 1.9 %    Differential Method Automated    Comprehensive Metabolic Panel    Collection Time: 04/15/23  2:25 AM   Result Value Ref Range    Sodium 132 (L) 136 - 145 mmol/L    Potassium 4.7 3.5 - 5.1 mmol/L    Chloride 97 95 - 110 mmol/L    CO2 28 23 - 29 mmol/L    Glucose 115 (H) 70 - 110 mg/dL    BUN 20 8 - 23 mg/dL    Creatinine 0.9 0.5 - 1.4 mg/dL    Calcium 8.5 (L) 8.7 - 10.5 mg/dL    Total Protein 5.5 (L) 6.0 - 8.4 g/dL    Albumin 1.8 (L) 3.5 - 5.2 g/dL    Total Bilirubin 0.4 0.1 - 1.0 mg/dL    Alkaline Phosphatase 94 55 - 135 U/L    AST 23 10 - 40 U/L    ALT 19 10 - 44 U/L    Anion Gap 7 (L) 8 - 16 mmol/L    eGFR >60 >60 mL/min/1.73 m^2       Microbiology Results (last 7 days)       Procedure Component Value Units Date/Time    Blood culture [[id number]] Collected: 04/11/23 1240    Order Status: Completed Specimen: Blood from  "Antecubital, Left Arm Updated: 04/14/23 1503     Blood Culture, Routine No Growth to date      No Growth to date      No Growth to date      No Growth to date    Narrative:      hard stick    Blood culture [[id number]] Collected: 04/11/23 1231    Order Status: Completed Specimen: Blood Updated: 04/14/23 1503     Blood Culture, Routine No Growth to date      No Growth to date      No Growth to date      No Growth to date    Culture, Respiratory with Gram Stain [[id number]]  (Abnormal)  (Susceptibility) Collected: 04/11/23 1438    Order Status: Completed Specimen: Respiratory from Tracheal Aspirate Updated: 04/14/23 1238     Respiratory Culture PSEUDOMONAS AERUGINOSA  Moderate       Gram Stain (Respiratory) Rare WBC's     Gram Stain (Respiratory) Many Gram positive rods     Gram Stain (Respiratory) Rare Gram positive cocci in pairs             Imaging Results              X-Ray Chest AP Portable (Final result)  Result time 03/18/23 12:01:13      Final result by Mariano Soto MD (03/18/23 12:01:13)                   Impression:      No detrimental change when compared with 12/29/2022.      Electronically signed by: Mariano Soto MD  Date:    03/18/2023  Time:    12:01               Narrative:    EXAMINATION:  XR CHEST AP PORTABLE    CLINICAL HISTORY:  Provided history is "chest pain;  ".    TECHNIQUE:  One view of the chest.    COMPARISON:  12/29/2022.    FINDINGS:  Tracheostomy tube is present with the tip overlying the tracheal air column between the clavicular heads and sherine.  Cardiac silhouette is borderline enlarged and similar to prior study.  Atherosclerotic calcifications overlie the aortic arch.  Probable pulmonary emphysema.  Coarsened interstitial lung markings with bibasilar subsegmental atelectasis.  Small left and trace right pleural effusions, similar to the prior study.  No pneumothorax.  Aeration is overall similar when compared with 12/29/2022. Probable percutaneous gastrostomy tube overlies " "the left upper quadrant of the abdomen.                                              Assessment/Plan:      * Acute on chronic respiratory failure with hypoxia and hypercapnia  At home is on 5L O2 via trach and O2 sats typically in high 80s low 90s. Worsening respiration this admission due to aspiration of blood and likely food aspiration. Required ventilator from 3/21 to 3/23, subsequently weaned. Treated empirically for pneumonia. Respiratory cultures show Stenotrophomonas which was not treated  - started bactrim to treat Stenotrophomonas  - worsening hypoxia and hypercapnia on 4/11. This could be related to trazodone use (attempted 4/10 PM for sleep)   - transferred to ICU and placed on ventilator   - treating potential pneumonia. Known Stenotrophomonas- treating with bactrim. Trach aspirate with GNR- on cefepime while awaiting speciation    - does not appear that he has had another MI   - significantly improved    - Pulmonary is following       Retroperitoneal hematoma  RP hematoma discovered after LHC. See "acute blood loss anemia"      NSTEMI (non-ST elevated myocardial infarction)  See "coronary artery disease"      Anxiety  Anxiety waxes and wanes with clinical status. He also has delirium  - Stopped hydroxyzine as this could be contributing to delirium   - Prior bad reaction to ativan  - Attempted trazodone to help sleep and then had worsening encephalopathy and hypoxic/hypercapnic respiratory failure the next morning  - may try mirtazapine if this becomes an issue    Hemoptysis  -See acute blood loss anemia  -this has resolved     Pneumonia  3/22 Respiratory culture: Stenotrophomonas, Achromobacter--> on bactrim x14 days for this  4/11 Respiratory culture: GNR--> on cefepime while awaiting speciation       PEG (percutaneous endoscopic gastrostomy) status  Continue medications via PEG. Does not take oral intake.   -On isosource 1.5 6 days daily with 120cc free water flushes via wife  -Will continue home tube " feedings, with nutrition consulted    Tracheostomy present  See respiratory failure     ACP (advance care planning)  Advance Care Planning     Date: 04/10/2023  Palliative consulted, reviewed documentation. Full code. Needs home palliative follow up upon discharge. Time spent reviewing on 4/10/23= 5 minutes          Severe protein-calorie malnutrition  Nutrition consulted. Most recent weight and BMI monitored-   Wt Readings from Last 3 Encounters:   04/07/23 0730 66.6 kg (146 lb 13.2 oz)   04/06/23 0715 65.7 kg (144 lb 13.5 oz)   04/05/23 0715 97.8 kg (215 lb 9.8 oz)   04/04/23 0715 97.3 kg (214 lb 8.1 oz)   04/03/23 0715 67 kg (147 lb 11.3 oz)   03/27/23 1324 68 kg (150 lb)   03/20/23 1130 68 kg (150 lb)   03/18/23 1747 68 kg (150 lb)   03/18/23 1028 68 kg (150 lb)   03/16/23 1600 65.8 kg (145 lb)   01/12/23 1414 65.3 kg (143 lb 15.4 oz)     Body mass index is 22.32 kg/m².     Recommendations: Recommendation/Intervention: 1. Continue with TF recs; Monitor diet advancements. 2. Advance diet as tolerated and appropriate. 3. Monitor weight changes; check weekly weights.  Goals: 1. Diet advancement by RD follow up.    Patient has been screened and assessed by RD. RD will follow patient.      Secondary malignant neoplasm of cervical lymph node  Noted      Acute blood loss anemia  Pt presented with hemoptysis. Scopes prior to admit and by ENT during admit not showing clear source. Bleeding improved. Pt underwent LHC for NSTEMI. After that procedure he was found to have a large retroperitoneal hematoma. Since starting DAPT he has also had recurrent hemoptysis as well as melanic stools. Trach cuff reinflated 4/2 with improvement in bleeding.  - trend CBC, transfuse prn  - Hgb now stable  - continue asa, plavix     COPD exacerbation  This seems resolved; however, did develop worsening hypoxic/hypercapnic respiratory failure  - coninue nebs      Other hyperlipidemia  -Continue home statin    Primary hypertension  Previously  hypotensive, now normotensive  - continue metoprolol     Coronary artery disease involving native coronary artery of native heart with unstable angina pectoris  Troponin elevated when pt was upgraded to the ICU. At that time it was thought to be due to demand, however he had persistent chest pain concerning for ACS. Was started on heparin gtt and tolerated. Subsequently went to [facility] which showed diffuse disease. Pt not a candidate for CABG. He subsequently underwent PCI of LAD and LCx.  - Continue asa, plavix, statin    Squamous cell cancer of tongue  Dx 2021, now s/p total glossectomy, bilateral neck dissection, bilateral cervical facial flaps and anterolateral thigh flap reconstruction of glossectomy defect 1/4/22. Completed adjuvant chemoradiation. Had trach changed by ENT 2 days prior to admit and scope at that time showed no signs of bleeding despite hemoptysis present on admission.     Carotid stenosis  Continues on asa, plavix, statin       Peripheral vascular disease  Continues on asa, plavix, statin         VTE Risk Mitigation (From admission, onward)           Ordered     IP VTE LOW RISK PATIENT  Once         04/11/23 0909     Place sequential compression device  Until discontinued         03/18/23 1620     Place NIKITA hose  Until discontinued         03/18/23 1620                    Discharge Planning   NISA: 4/17/2023     Code Status: Full Code   Is the patient medically ready for discharge?:     Reason for patient still in hospital (select all that apply): Patient trending condition  Discharge Plan A: Long-term acute care facility (LTAC)   Discharge Delays: None known at this time    12:14 PM Updated wife Bridgett Richard by phone.     Critical care time spent on the evaluation and treatment of severe organ dysfunction, review of pertinent labs and imaging studies, discussions with consulting providers and discussions with patient/family: 30 minutes.      Andry Zhao MD  Department of Hospital Medicine   [place]  [facility] - Intensive Care

## 2023-04-15 NOTE — ASSESSMENT & PLAN NOTE
Vent Mode: PS/CPAP  Oxygen Concentration (%):  [35] 35  Resp Rate Total:  [21 br/min] 21 br/min  PEEP/CPAP:  [8 cmH20] 8 cmH20  Pressure Support:  [15 fdY67-08 cmH20] 15 cmH20  Mean Airway Pressure:  [12.3 pyH12-50.6 cmH20] 16.4 cmH20  Placed back on the ventilator for hypercapnea  - hypoxia and hypercapnea - weaning O2 for goal sat of 88% and above  - COPD treatment as above  - sputum culture- with pseudomonas  - improving

## 2023-04-15 NOTE — PLAN OF CARE
Pt remains on PS. PRN Ativan given once for anxiety per pt request, relief noted. Plan for LTAC at some point. Pt able to make needs known. Understands plan of care.     Problem: Adult Inpatient Plan of Care  Goal: Plan of Care Review  Outcome: Ongoing, Progressing  Goal: Patient-Specific Goal (Individualized)  Outcome: Ongoing, Progressing  Goal: Absence of Hospital-Acquired Illness or Injury  Outcome: Ongoing, Progressing  Goal: Optimal Comfort and Wellbeing  Outcome: Ongoing, Progressing  Goal: Readiness for Transition of Care  Outcome: Ongoing, Progressing

## 2023-04-15 NOTE — NURSING
[facility]  Nurses Note -- 4 Eyes      4/15/2023       Skin assessed on: Q Shift      [x] No Pressure Injuries Present    [x]Prevention Measures Documented    [] Yes LDA  for Pressure Injury Previously documented     [] Yes New Pressure Injury Discovered   [] LDA for New Pressure Injury Added      Attending RN:  Anup Wilson RN     Second RN:  Cindy Good RN

## 2023-04-15 NOTE — SUBJECTIVE & OBJECTIVE
Interval History: Mr. Richard has continued to do well. No issues overnight.       Objective:     Vital Signs (Most Recent):  Temp: 97.5 °F (36.4 °C) (04/15/23 0900)  Pulse: 88 (04/15/23 1144)  Resp: 15 (04/15/23 1144)  BP: 100/67 (04/15/23 1100)  SpO2: 98 % (04/15/23 1144) Vital Signs (24h Range):  Temp:  [97.5 °F (36.4 °C)-98.7 °F (37.1 °C)] 97.5 °F (36.4 °C)  Pulse:  [] 88  Resp:  [9.6-27.6] 15  SpO2:  [71 %-100 %] 98 %  BP: ()/(55-69) 100/67     Weight: 66.6 kg (146 lb 13.2 oz)  Body mass index is 22.32 kg/m².      Intake/Output Summary (Last 24 hours) at 4/15/2023 1247  Last data filed at 4/15/2023 1245  Gross per 24 hour   Intake 2179.94 ml   Output 1790 ml   Net 389.94 ml       Physical Exam  Vitals reviewed.   Constitutional:       Appearance: He is ill-appearing (chronically ill). He is not toxic-appearing.   HENT:      Head: Normocephalic and atraumatic.      Nose: Nose normal. No congestion.      Mouth/Throat:      Mouth: Mucous membranes are moist.      Pharynx: Oropharynx is clear. No oropharyngeal exudate.   Eyes:      Extraocular Movements: Extraocular movements intact.      Conjunctiva/sclera: Conjunctivae normal.      Pupils: Pupils are equal, round, and reactive to light.   Neck:      Comments: Cuffed 8 trach   Cardiovascular:      Rate and Rhythm: Normal rate and regular rhythm.      Pulses: Normal pulses.      Heart sounds: No murmur heard.  Pulmonary:      Effort: Tachypnea present. No respiratory distress.      Breath sounds: No wheezing or rhonchi.      Comments: Increased respiratory effort when PS is weaned.   Abdominal:      General: Abdomen is flat. Bowel sounds are normal.      Palpations: Abdomen is soft.   Musculoskeletal:         General: No swelling.   Skin:     General: Skin is warm and dry.      Capillary Refill: Capillary refill takes less than 2 seconds.   Neurological:      General: No focal deficit present.      Mental Status: He is oriented to person, place, and time.       Comments: Not responding to commands or voice      Review of Systems    Vents:  Vent Mode: PS/CPAP (04/15/23 1144)  Ventilator Initiated: Yes (04/11/23 0936)  Set Rate: 24 BPM (04/14/23 0821)  Vt Set: 400 mL (04/14/23 0821)  Pressure Support: 15 cmH20 (04/15/23 1144)  PEEP/CPAP: 8 cmH20 (04/15/23 1144)  Oxygen Concentration (%): 35 (04/15/23 1144)  Peak Airway Pressure: 23.4 cmH20 (04/15/23 1144)  Total Ve: 12.78 L/m (04/15/23 1144)  F/VT Ratio<105 (RSBI): (!) 47.11 (04/15/23 1144)    Lines/Drains/Airways       Peripherally Inserted Central Catheter Line  Duration             PICC Triple Lumen 03/20/23 2145 right basilic 25 days              Drain  Duration                  Gastrostomy/Enterostomy 01/04/22 Percutaneous endoscopic gastrostomy (PEG)  days    Male External Urinary Catheter 04/12/23 1900 2 days              Airway  Duration             Adult Surgical Airway 03/16/23 1014 Shiley Cuffed 6.0 30 days                    Significant Labs:    CBC/Anemia Profile:  Recent Labs   Lab 04/14/23  0346 04/15/23  0225   WBC 6.85 5.92   HGB 8.9* 8.2*   HCT 28.9* 26.5*    227   MCV 97 97   RDW 18.0* 18.1*        Chemistries:  Recent Labs   Lab 04/14/23  0346 04/15/23  0225    132*   K 4.8 4.7   CL 97 97   CO2 32* 28   BUN 20 20   CREATININE 0.9 0.9   CALCIUM 8.4* 8.5*   ALBUMIN 2.0* 1.8*   PROT 5.8* 5.5*   BILITOT 0.5 0.4   ALKPHOS 104 94   ALT 25 19   AST 30 23       All pertinent labs within the past 24 hours have been reviewed.    Significant Imaging:  I have reviewed all pertinent imaging results/findings within the past 24 hours.

## 2023-04-16 LAB
ALBUMIN SERPL BCP-MCNC: 1.8 G/DL (ref 3.5–5.2)
ALP SERPL-CCNC: 103 U/L (ref 55–135)
ALT SERPL W/O P-5'-P-CCNC: 20 U/L (ref 10–44)
ANION GAP SERPL CALC-SCNC: 7 MMOL/L (ref 8–16)
AST SERPL-CCNC: 34 U/L (ref 10–40)
BASOPHILS # BLD AUTO: 0.01 K/UL (ref 0–0.2)
BASOPHILS NFR BLD: 0.2 % (ref 0–1.9)
BILIRUB SERPL-MCNC: 0.4 MG/DL (ref 0.1–1)
BUN SERPL-MCNC: 20 MG/DL (ref 8–23)
CALCIUM SERPL-MCNC: 9.2 MG/DL (ref 8.7–10.5)
CHLORIDE SERPL-SCNC: 98 MMOL/L (ref 95–110)
CO2 SERPL-SCNC: 29 MMOL/L (ref 23–29)
CREAT SERPL-MCNC: 0.9 MG/DL (ref 0.5–1.4)
DIFFERENTIAL METHOD: ABNORMAL
EOSINOPHIL # BLD AUTO: 0.2 K/UL (ref 0–0.5)
EOSINOPHIL NFR BLD: 3.9 % (ref 0–8)
ERYTHROCYTE [DISTWIDTH] IN BLOOD BY AUTOMATED COUNT: 17.8 % (ref 11.5–14.5)
EST. GFR  (NO RACE VARIABLE): >60 ML/MIN/1.73 M^2
GLUCOSE SERPL-MCNC: 113 MG/DL (ref 70–110)
HCT VFR BLD AUTO: 26.7 % (ref 40–54)
HGB BLD-MCNC: 8 G/DL (ref 14–18)
IMM GRANULOCYTES # BLD AUTO: 0.02 K/UL (ref 0–0.04)
IMM GRANULOCYTES NFR BLD AUTO: 0.4 % (ref 0–0.5)
LYMPHOCYTES # BLD AUTO: 0.3 K/UL (ref 1–4.8)
LYMPHOCYTES NFR BLD: 6 % (ref 18–48)
MCH RBC QN AUTO: 29.1 PG (ref 27–31)
MCHC RBC AUTO-ENTMCNC: 30 G/DL (ref 32–36)
MCV RBC AUTO: 97 FL (ref 82–98)
MONOCYTES # BLD AUTO: 0.6 K/UL (ref 0.3–1)
MONOCYTES NFR BLD: 12.1 % (ref 4–15)
NEUTROPHILS # BLD AUTO: 4 K/UL (ref 1.8–7.7)
NEUTROPHILS NFR BLD: 77.4 % (ref 38–73)
NRBC BLD-RTO: 0 /100 WBC
PLATELET # BLD AUTO: 234 K/UL (ref 150–450)
PMV BLD AUTO: 10.2 FL (ref 9.2–12.9)
POTASSIUM SERPL-SCNC: 4.8 MMOL/L (ref 3.5–5.1)
PROT SERPL-MCNC: 5.8 G/DL (ref 6–8.4)
RBC # BLD AUTO: 2.75 M/UL (ref 4.6–6.2)
SODIUM SERPL-SCNC: 134 MMOL/L (ref 136–145)
WBC # BLD AUTO: 5.14 K/UL (ref 3.9–12.7)

## 2023-04-16 PROCEDURE — 36415 COLL VENOUS BLD VENIPUNCTURE: CPT | Performed by: HOSPITALIST

## 2023-04-16 PROCEDURE — 94761 N-INVAS EAR/PLS OXIMETRY MLT: CPT

## 2023-04-16 PROCEDURE — 99900026 HC AIRWAY MAINTENANCE (STAT)

## 2023-04-16 PROCEDURE — 25000003 PHARM REV CODE 250: Performed by: HOSPITALIST

## 2023-04-16 PROCEDURE — 25000242 PHARM REV CODE 250 ALT 637 W/ HCPCS: Performed by: HOSPITALIST

## 2023-04-16 PROCEDURE — 94640 AIRWAY INHALATION TREATMENT: CPT

## 2023-04-16 PROCEDURE — 85025 COMPLETE CBC W/AUTO DIFF WBC: CPT | Performed by: HOSPITALIST

## 2023-04-16 PROCEDURE — 20000000 HC ICU ROOM

## 2023-04-16 PROCEDURE — A4216 STERILE WATER/SALINE, 10 ML: HCPCS | Performed by: HOSPITALIST

## 2023-04-16 PROCEDURE — 27000221 HC OXYGEN, UP TO 24 HOURS

## 2023-04-16 PROCEDURE — 27200966 HC CLOSED SUCTION SYSTEM

## 2023-04-16 PROCEDURE — 80053 COMPREHEN METABOLIC PANEL: CPT | Performed by: HOSPITALIST

## 2023-04-16 PROCEDURE — 94668 MNPJ CHEST WALL SBSQ: CPT

## 2023-04-16 PROCEDURE — 63600175 PHARM REV CODE 636 W HCPCS: Performed by: HOSPITALIST

## 2023-04-16 PROCEDURE — 25000242 PHARM REV CODE 250 ALT 637 W/ HCPCS: Performed by: INTERNAL MEDICINE

## 2023-04-16 PROCEDURE — 63600175 PHARM REV CODE 636 W HCPCS: Performed by: STUDENT IN AN ORGANIZED HEALTH CARE EDUCATION/TRAINING PROGRAM

## 2023-04-16 PROCEDURE — 99900035 HC TECH TIME PER 15 MIN (STAT)

## 2023-04-16 PROCEDURE — 99291 PR CRITICAL CARE, E/M 30-74 MINUTES: ICD-10-PCS | Mod: ,,, | Performed by: INTERNAL MEDICINE

## 2023-04-16 PROCEDURE — 99291 CRITICAL CARE FIRST HOUR: CPT | Mod: ,,, | Performed by: INTERNAL MEDICINE

## 2023-04-16 PROCEDURE — 94003 VENT MGMT INPAT SUBQ DAY: CPT

## 2023-04-16 RX ADMIN — IPRATROPIUM BROMIDE AND ALBUTEROL SULFATE 3 ML: 2.5; .5 SOLUTION RESPIRATORY (INHALATION) at 08:04

## 2023-04-16 RX ADMIN — Medication 10 ML: at 05:04

## 2023-04-16 RX ADMIN — LORAZEPAM 0.5 MG: 2 INJECTION INTRAMUSCULAR; INTRAVENOUS at 08:04

## 2023-04-16 RX ADMIN — CLOPIDOGREL BISULFATE 75 MG: 75 TABLET ORAL at 08:04

## 2023-04-16 RX ADMIN — CHLORHEXIDINE GLUCONATE 0.12% ORAL RINSE 15 ML: 1.2 LIQUID ORAL at 08:04

## 2023-04-16 RX ADMIN — ATORVASTATIN CALCIUM 80 MG: 40 TABLET, FILM COATED ORAL at 08:04

## 2023-04-16 RX ADMIN — CEFEPIME HYDROCHLORIDE 2 G: 2 INJECTION, SOLUTION INTRAVENOUS at 02:04

## 2023-04-16 RX ADMIN — METOPROLOL TARTRATE 25 MG: 25 TABLET, FILM COATED ORAL at 08:04

## 2023-04-16 RX ADMIN — LORAZEPAM 0.5 MG: 2 INJECTION INTRAMUSCULAR; INTRAVENOUS at 07:04

## 2023-04-16 RX ADMIN — OXYCODONE HYDROCHLORIDE 5 MG: 5 TABLET ORAL at 08:04

## 2023-04-16 RX ADMIN — IPRATROPIUM BROMIDE AND ALBUTEROL SULFATE 3 ML: 2.5; .5 SOLUTION RESPIRATORY (INHALATION) at 11:04

## 2023-04-16 RX ADMIN — MELATONIN TAB 3 MG 9 MG: 3 TAB at 08:04

## 2023-04-16 RX ADMIN — OXYCODONE HYDROCHLORIDE 5 MG: 5 TABLET ORAL at 07:04

## 2023-04-16 RX ADMIN — ACETYLCYSTEINE 4 ML: 100 SOLUTION ORAL; RESPIRATORY (INHALATION) at 03:04

## 2023-04-16 RX ADMIN — CEFEPIME HYDROCHLORIDE 2 G: 2 INJECTION, SOLUTION INTRAVENOUS at 08:04

## 2023-04-16 RX ADMIN — FAMOTIDINE 20 MG: 10 INJECTION, SOLUTION INTRAVENOUS at 08:04

## 2023-04-16 RX ADMIN — SULFAMETHOXAZOLE AND TRIMETHOPRIM 1 TABLET: 800; 160 TABLET ORAL at 08:04

## 2023-04-16 RX ADMIN — CEFEPIME HYDROCHLORIDE 2 G: 2 INJECTION, SOLUTION INTRAVENOUS at 05:04

## 2023-04-16 RX ADMIN — IPRATROPIUM BROMIDE AND ALBUTEROL SULFATE 3 ML: 2.5; .5 SOLUTION RESPIRATORY (INHALATION) at 03:04

## 2023-04-16 RX ADMIN — Medication 10 ML: at 12:04

## 2023-04-16 RX ADMIN — FERROUS SULFATE TAB 325 MG (65 MG ELEMENTAL FE) 1 EACH: 325 (65 FE) TAB at 08:04

## 2023-04-16 RX ADMIN — SENNOSIDES AND DOCUSATE SODIUM 2 TABLET: 50; 8.6 TABLET ORAL at 08:04

## 2023-04-16 RX ADMIN — ACETYLCYSTEINE 4 ML: 100 SOLUTION ORAL; RESPIRATORY (INHALATION) at 08:04

## 2023-04-16 RX ADMIN — ASPIRIN 81 MG CHEWABLE TABLET 81 MG: 81 TABLET CHEWABLE at 08:04

## 2023-04-16 RX ADMIN — IPRATROPIUM BROMIDE AND ALBUTEROL SULFATE 3 ML: 2.5; .5 SOLUTION RESPIRATORY (INHALATION) at 07:04

## 2023-04-16 RX ADMIN — FAMOTIDINE 20 MG: 10 INJECTION, SOLUTION INTRAVENOUS at 09:04

## 2023-04-16 NOTE — ASSESSMENT & PLAN NOTE
Troponin elevated when pt was upgraded to the ICU. At that time it was thought to be due to demand, however he had persistent chest pain concerning for ACS. Was started on heparin gtt and tolerated. Subsequently went to [facility] which showed diffuse disease. Pt not a candidate for CABG. He subsequently underwent PCI of LAD and LCx.  - Continue asa, plavix, statin

## 2023-04-16 NOTE — PLAN OF CARE
Pt remains in ICU on ventilator. PRN Ativan given once. Less anxiety overall today. Pt resting between care. Pt able to visit with family today. Offered pt a bath/linen change but pt refuses at this time.     Problem: Adult Inpatient Plan of Care  Goal: Plan of Care Review  Outcome: Ongoing, Progressing  Goal: Patient-Specific Goal (Individualized)  Outcome: Ongoing, Progressing  Goal: Absence of Hospital-Acquired Illness or Injury  Outcome: Ongoing, Progressing  Goal: Optimal Comfort and Wellbeing  Outcome: Ongoing, Progressing  Goal: Readiness for Transition of Care  Outcome: Ongoing, Progressing

## 2023-04-16 NOTE — PROGRESS NOTES
[facility] Medicine  Progress Note    Patient Name: Fareed Richard Jr.  MRN: [id number]  Patient Class: IP- Inpatient   Admission Date: 3/18/2023  Length of Stay: 28 days  Attending Physician: Kelby Sargent MD  Primary Care Provider: Kodi Tubbs MD        Subjective:     Principal Problem:Acute on chronic respiratory failure with hypoxia and hypercapnia        HPI:  Fareed Richard Jr. 73 y.o. 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  Per his wife 4 days ago he began to have pink tinged sputum via his trach.  He was seen by his ENT 2 days ago with a scope exam and a trach exchange without evidence of bleeding.  This morning at 3:00 a.m. he developed bright red blood via trach which resolved without intervention.  It is since not recurred.  He takes a daily aspirin.  He receives all medications via G-tube.  His tube feeding regimen consists of 6 cans of Isosource daily with 120 cc free water boluses.  He denies fever chest pain shortness of breath nausea vomiting abdominal pain leg swelling syncope dizziness dysuria melena hematuria hematemesis.    In the ED, hemoglobin 7.6 INR 1.0 BUN 50 creatinine 0.7  troponin negative BRCA negative O positive type and screen chest x-ray without detrimental change hypotensive to 85/54.      Overview/Hospital Course:  72 yo M w squamous CA of tongue s/p glossectomy and reconstructive flap with trach, CAD, chronic hypoxic respiratory failure (on 5L at home) and with peg presented for evaluation of hemoptysis 2 days after tracheostomy change in clinic. Transferred to ICU 3/19 when markedly hypotensive.  Unclear if due to hemorrhage, cardiogenic or septic shock.  Did require PRBC and TXA nebs but didn't seem like sufficient bleeding to cause shock.  Bleeding stopped and ENT following.  CTA shows either significant mucus  plugging or bibasilar pneumonia - pulmonology favored pneumonia.  Trach aspirate with Stenotrophomonas, Achromobacter, and Candida. Also urine culture growing Enterococcus.  He is on broad spectrum antibiotics including bactrim. Developed NSTEMI. Cardiology consulted. [place] 3/23 which showed distal LM 30%, mild LAD 90% bifurcation lesion with D1, Cx mid 80%, RCA moderate diffuse disease with long 70% and some left to right collateral. All vessels heavily calcified. Not a candidate for CABG but plan for staged PCI. Continued on heparin drip, asa/plavix. Vasopressors weaned. [place] 3/27 PCI to LAD and LCx. Post operatively he was hemodynamically unstable and anemic. Stat CTA showed RP hematoma near L kidney without acute bleeding. Pt transfused supportively. He improved over the next few days. Stepped down to floor. Remains on trach collar, higher than home O2 requirements. He has delirium as well and poor sleep. Attempted trazodone. The following morning, he was much more lethargic and was transferred to ICU for worsening acute on chronic hypoxic/hypercapnic respiratory failure. This improved after 1 day on the vent.     Trach aspirate with GNR; remains on cefepime in addition to bactrim for prior Stenotrophomonas. Improving at 98%, wean O2. Appears Steno and Achro being effectively treated with bactrim, now growing pseudomonas and has been on cefepime, improving. Per ICU discussion, wife now more understanding he needs LTAC placement.       Interval History:   No events since transfer of care to ICU team    Review of Systems   Constitutional:  Positive for activity change and fatigue.   Cardiovascular:  Negative for chest pain.   Gastrointestinal:  Negative for abdominal pain.     Objective:     Vital Signs (Most Recent):  Temp: 98.4 °F (36.9 °C) (04/16/23 0400)  Pulse: 77 (04/16/23 1100)  Resp: 11.9 (04/16/23 1100)  BP: (!) 107/58 (04/16/23 1100)  SpO2: 99 % (04/16/23 1100)   Vital Signs (24h Range):  Temp:  [98.4 °F  (36.9 °C)-98.8 °F (37.1 °C)] 98.4 °F (36.9 °C)  Pulse:  [] 77  Resp:  [11.3-95] 11.9  SpO2:  [20 %-100 %] 99 %  BP: ()/(56-80) 107/58     Weight: 66.6 kg (146 lb 13.2 oz)  Body mass index is 22.32 kg/m².    Intake/Output Summary (Last 24 hours) at 4/16/2023 1120  Last data filed at 4/16/2023 0800  Gross per 24 hour   Intake 1110 ml   Output 600 ml   Net 510 ml        Physical Exam  Vitals reviewed.   Constitutional:       General: He is not in acute distress.     Appearance: He is well-developed and normal weight. He is ill-appearing. He is not toxic-appearing or diaphoretic.   HENT:      Head: Normocephalic and atraumatic.   Eyes:      General: No scleral icterus.     Pupils: Pupils are equal, round, and reactive to light.   Neck:      Thyroid: No thyromegaly.   Cardiovascular:      Rate and Rhythm: Normal rate and regular rhythm.      Heart sounds: No murmur heard.    No gallop.   Pulmonary:      Effort: Pulmonary effort is normal. No respiratory distress.      Breath sounds: Normal breath sounds. No stridor.   Abdominal:      General: Bowel sounds are normal. There is no distension.      Palpations: Abdomen is soft.      Tenderness: There is no abdominal tenderness.   Musculoskeletal:         General: No deformity. Normal range of motion.      Cervical back: Normal range of motion.   Skin:     General: Skin is warm.      Capillary Refill: Capillary refill takes less than 2 seconds.      Coloration: Skin is not jaundiced.      Findings: No bruising.   Neurological:      Mental Status: He is alert and oriented to person, place, and time.      Motor: Weakness present.      Gait: Gait abnormal.   Psychiatric:         Mood and Affect: Mood normal.         Behavior: Behavior normal.           Recent Results (from the past 24 hour(s))   CBC Auto Differential    Collection Time: 04/16/23  3:15 AM   Result Value Ref Range    WBC 5.14 3.90 - 12.70 K/uL    RBC 2.75 (L) 4.60 - 6.20 M/uL    Hemoglobin 8.0 (L) 14.0  - 18.0 g/dL    Hematocrit 26.7 (L) 40.0 - 54.0 %    MCV 97 82 - 98 fL    MCH 29.1 27.0 - 31.0 pg    MCHC 30.0 (L) 32.0 - 36.0 g/dL    RDW 17.8 (H) 11.5 - 14.5 %    Platelets 234 150 - 450 K/uL    MPV 10.2 9.2 - 12.9 fL    Immature Granulocytes 0.4 0.0 - 0.5 %    Gran # (ANC) 4.0 1.8 - 7.7 K/uL    Immature Grans (Abs) 0.02 0.00 - 0.04 K/uL    Lymph # 0.3 (L) 1.0 - 4.8 K/uL    Mono # 0.6 0.3 - 1.0 K/uL    Eos # 0.2 0.0 - 0.5 K/uL    Baso # 0.01 0.00 - 0.20 K/uL    nRBC 0 0 /100 WBC    Gran % 77.4 (H) 38.0 - 73.0 %    Lymph % 6.0 (L) 18.0 - 48.0 %    Mono % 12.1 4.0 - 15.0 %    Eosinophil % 3.9 0.0 - 8.0 %    Basophil % 0.2 0.0 - 1.9 %    Differential Method Automated    Comprehensive Metabolic Panel    Collection Time: 04/16/23  3:15 AM   Result Value Ref Range    Sodium 134 (L) 136 - 145 mmol/L    Potassium 4.8 3.5 - 5.1 mmol/L    Chloride 98 95 - 110 mmol/L    CO2 29 23 - 29 mmol/L    Glucose 113 (H) 70 - 110 mg/dL    BUN 20 8 - 23 mg/dL    Creatinine 0.9 0.5 - 1.4 mg/dL    Calcium 9.2 8.7 - 10.5 mg/dL    Total Protein 5.8 (L) 6.0 - 8.4 g/dL    Albumin 1.8 (L) 3.5 - 5.2 g/dL    Total Bilirubin 0.4 0.1 - 1.0 mg/dL    Alkaline Phosphatase 103 55 - 135 U/L    AST 34 10 - 40 U/L    ALT 20 10 - 44 U/L    Anion Gap 7 (L) 8 - 16 mmol/L    eGFR >60 >60 mL/min/1.73 m^2       Microbiology Results (last 7 days)       Procedure Component Value Units Date/Time    Blood culture [[id number]] Collected: 04/11/23 1240    Order Status: Completed Specimen: Blood from Antecubital, Left Arm Updated: 04/15/23 1503     Blood Culture, Routine No Growth after 4 days.    Narrative:      hard stick    Blood culture [[id number]] Collected: 04/11/23 1231    Order Status: Completed Specimen: Blood Updated: 04/15/23 1503     Blood Culture, Routine No Growth after 4 days.    Culture, Respiratory with Gram Stain [[id number]]  (Abnormal)  (Susceptibility) Collected: 04/11/23 1438    Order Status: Completed Specimen: Respiratory from Tracheal Aspirate  "Updated: 04/14/23 1238     Respiratory Culture PSEUDOMONAS AERUGINOSA  Moderate       Gram Stain (Respiratory) Rare WBC's     Gram Stain (Respiratory) Many Gram positive rods     Gram Stain (Respiratory) Rare Gram positive cocci in pairs             Imaging Results              X-Ray Chest AP Portable (Final result)  Result time 03/18/23 12:01:13      Final result by Mariano Soto MD (03/18/23 12:01:13)                   Impression:      No detrimental change when compared with 12/29/2022.      Electronically signed by: Mariano Soto MD  Date:    03/18/2023  Time:    12:01               Narrative:    EXAMINATION:  XR CHEST AP PORTABLE    CLINICAL HISTORY:  Provided history is "chest pain;  ".    TECHNIQUE:  One view of the chest.    COMPARISON:  12/29/2022.    FINDINGS:  Tracheostomy tube is present with the tip overlying the tracheal air column between the clavicular heads and sherine.  Cardiac silhouette is borderline enlarged and similar to prior study.  Atherosclerotic calcifications overlie the aortic arch.  Probable pulmonary emphysema.  Coarsened interstitial lung markings with bibasilar subsegmental atelectasis.  Small left and trace right pleural effusions, similar to the prior study.  No pneumothorax.  Aeration is overall similar when compared with 12/29/2022. Probable percutaneous gastrostomy tube overlies the left upper quadrant of the abdomen.                                            Assessment/Plan:      * Acute on chronic respiratory failure with hypoxia and hypercapnia  At home is on 5L O2 via trach and O2 sats typically in high 80s low 90s. Worsening respiration this admission due to aspiration of blood and likely food aspiration. Required ventilator from 3/21 to 3/23, subsequently weaned. Treated empirically for pneumonia. Respiratory cultures show Stenotrophomonas which was not treated  - started bactrim to treat Stenotrophomonas  - worsening hypoxia and hypercapnia on 4/11. This could be " "related to trazodone use (attempted 4/10 PM for sleep)   - transferred to ICU and placed on ventilator   - treating potential pneumonia. Known Stenotrophomonas- treating with bactrim. Trach aspirate with GNR- on cefepime while awaiting speciation    - does not appear that he has had another MI   - significantly improved    - Pulmonary is following       Retroperitoneal hematoma  RP hematoma discovered after LHC. See "acute blood loss anemia"      NSTEMI (non-ST elevated myocardial infarction)  See "coronary artery disease"      Anxiety  Anxiety waxes and wanes with clinical status. He also has delirium  - Stopped hydroxyzine as this could be contributing to delirium   - Prior bad reaction to ativan  - Attempted trazodone to help sleep and then had worsening encephalopathy and hypoxic/hypercapnic respiratory failure the next morning  - may try mirtazapine if this becomes an issue    Hemoptysis  -See acute blood loss anemia  -this has resolved     Pneumonia  3/22 Respiratory culture: Stenotrophomonas, Achromobacter--> on bactrim x14 days for this  4/11 Respiratory culture: pseudomonas -> on cefepime    PEG (percutaneous endoscopic gastrostomy) status  Continue medications via PEG. Does not take oral intake.   -On isosource 1.5 6 days daily with 120cc free water flushes via wife  -Will continue home tube feedings, with nutrition consulted    Tracheostomy present  See respiratory failure     ACP (advance care planning)  Advance Care Planning     Date: 04/10/2023  Palliative consulted, reviewed documentation. Full code. Needs home palliative follow up upon discharge. Time spent reviewing on 4/10/23= 5 minutes          Severe protein-calorie malnutrition  RD following. On tube feeds.      Secondary malignant neoplasm of cervical lymph node  Noted      Acute blood loss anemia  Pt presented with hemoptysis. Scopes prior to admit and by ENT during admit not showing clear source. Bleeding improved. Pt underwent LHC for " NSTEMI. After that procedure he was found to have a large retroperitoneal hematoma. Since starting DAPT he has also had recurrent hemoptysis as well as melanic stools. Trach cuff reinflated 4/2 with improvement in bleeding.  - trend CBC, transfuse prn  - Hgb now stable  - continue asa, plavix     COPD exacerbation  This seems resolved; however, did develop worsening hypoxic/hypercapnic respiratory failure  - coninue nebs      Other hyperlipidemia  -Continue home statin    Primary hypertension  Previously hypotensive, now normotensive  - continue metoprolol     Coronary artery disease involving native coronary artery of native heart with unstable angina pectoris  Troponin elevated when pt was upgraded to the ICU. At that time it was thought to be due to demand, however he had persistent chest pain concerning for ACS. Was started on heparin gtt and tolerated. Subsequently went to [facility] which showed diffuse disease. Pt not a candidate for CABG. He subsequently underwent PCI of LAD and LCx.  - Continue asa, plavix, statin    Squamous cell cancer of tongue  Dx 2021, now s/p total glossectomy, bilateral neck dissection, bilateral cervical facial flaps and anterolateral thigh flap reconstruction of glossectomy defect 1/4/22. Completed adjuvant chemoradiation. Had trach changed by ENT 2 days prior to admit and scope at that time showed no signs of bleeding despite hemoptysis present on admission.     Carotid stenosis  Continues on asa, plavix, statin       Peripheral vascular disease  Continues on asa, plavix, statin         VTE Risk Mitigation (From admission, onward)         Ordered     IP VTE LOW RISK PATIENT  Once         04/11/23 0909     Place sequential compression device  Until discontinued         03/18/23 1620     Place NIKITA hose  Until discontinued         03/18/23 1620                Discharge Planning   NISA: 4/17/2023     Code Status: Full Code   Is the patient medically ready for discharge?:     Reason for  patient still in hospital (select all that apply): Patient trending condition, Laboratory test, Treatment, Consult recommendations and Pending disposition  Discharge Plan A: Long-term acute care facility (LTAC)   Discharge Delays: None known at this time        Critical care time spent on the evaluation and treatment of severe organ dysfunction, review of pertinent labs and imaging studies, discussions with consulting providers and discussions with patient/family: 45 minutes.      Kelby Sargent MD  Department of Hospital Medicine   [facility] - [place] - Intensive Care

## 2023-04-16 NOTE — NURSING
[facility], [facility]  Nurses Note -- 4 Eyes      4/16/2023       Skin assessed on: Q Shift      [x] No Pressure Injuries Present    [x]Prevention Measures Documented    [] Yes LDA  for Pressure Injury Previously documented     [] Yes New Pressure Injury Discovered   [] LDA for New Pressure Injury Added      Attending RN:  Anup Wilson RN     Second RN:  AMEE Verma

## 2023-04-16 NOTE — ASSESSMENT & PLAN NOTE
3/22 Respiratory culture: Stenotrophomonas, Achromobacter--> on bactrim x14 days for this  4/11 Respiratory culture: pseudomonas -> on cefepime

## 2023-04-16 NOTE — NURSING
[facility], [facility]  Nurses Note -- 4 Eyes      4/16/2023       Skin assessed on: Q Shift      [x] No Pressure Injuries Present    []Prevention Measures Documented    [] Yes LDA  for Pressure Injury Previously documented     [] Yes New Pressure Injury Discovered   [] LDA for New Pressure Injury Added      Attending RN:  Louann Verma, RN     Second RN:  Anup WilsonRN

## 2023-04-16 NOTE — SUBJECTIVE & OBJECTIVE
Interval History: Mr. Richard has continued to do well. No issues overnight.       Objective:     Vital Signs (Most Recent):  Temp: 98.4 °F (36.9 °C) (04/16/23 0400)  Pulse: 77 (04/16/23 1100)  Resp: 11.9 (04/16/23 1100)  BP: (!) 107/58 (04/16/23 1100)  SpO2: 99 % (04/16/23 1100) Vital Signs (24h Range):  Temp:  [98.4 °F (36.9 °C)-98.8 °F (37.1 °C)] 98.4 °F (36.9 °C)  Pulse:  [] 77  Resp:  [11.3-95] 11.9  SpO2:  [20 %-100 %] 99 %  BP: ()/(56-80) 107/58     Weight: 66.6 kg (146 lb 13.2 oz)  Body mass index is 22.32 kg/m².      Intake/Output Summary (Last 24 hours) at 4/16/2023 1144  Last data filed at 4/16/2023 0800  Gross per 24 hour   Intake 1110 ml   Output 600 ml   Net 510 ml         Physical Exam  Vitals reviewed.   Constitutional:       Appearance: He is ill-appearing (chronically ill). He is not toxic-appearing.   HENT:      Head: Normocephalic and atraumatic.      Nose: Nose normal. No congestion.      Mouth/Throat:      Mouth: Mucous membranes are moist.      Pharynx: Oropharynx is clear. No oropharyngeal exudate.   Eyes:      Extraocular Movements: Extraocular movements intact.      Conjunctiva/sclera: Conjunctivae normal.      Pupils: Pupils are equal, round, and reactive to light.   Neck:      Comments: Cuffed 8 trach   Cardiovascular:      Rate and Rhythm: Normal rate and regular rhythm.      Pulses: Normal pulses.      Heart sounds: No murmur heard.  Pulmonary:      Effort: Tachypnea present. No respiratory distress.      Breath sounds: No wheezing or rhonchi.      Comments: Increased respiratory effort when PS is weaned.   Abdominal:      General: Abdomen is flat. Bowel sounds are normal.      Palpations: Abdomen is soft.   Musculoskeletal:         General: No swelling.   Skin:     General: Skin is warm and dry.      Capillary Refill: Capillary refill takes less than 2 seconds.   Neurological:      General: No focal deficit present.      Mental Status: He is oriented to person, place, and  time.      Comments: Not responding to commands or voice      Review of Systems    Vents:  Vent Mode: PS/CPAP (04/16/23 0805)  Ventilator Initiated: Yes (04/11/23 0936)  Set Rate: 24 BPM (04/14/23 0821)  Vt Set: 400 mL (04/14/23 0821)  Pressure Support: 15 cmH20 (04/16/23 0805)  PEEP/CPAP: 8 cmH20 (04/16/23 0805)  Oxygen Concentration (%): 35 (04/16/23 1100)  Peak Airway Pressure: 24.5 cmH20 (04/16/23 0805)  Total Ve: 11.15 L/m (04/16/23 0805)  F/VT Ratio<105 (RSBI): 107.63 (04/16/23 0805)    Lines/Drains/Airways       Peripherally Inserted Central Catheter Line  Duration             PICC Triple Lumen 03/20/23 2145 right basilic 26 days              Drain  Duration                  Gastrostomy/Enterostomy 01/04/22 Percutaneous endoscopic gastrostomy (PEG)  days    Male External Urinary Catheter 04/12/23 1900 3 days              Airway  Duration             Adult Surgical Airway 03/16/23 1014 Shiley Cuffed 6.0 31 days                    Significant Labs:    CBC/Anemia Profile:  Recent Labs   Lab 04/15/23  0225 04/16/23  0315   WBC 5.92 5.14   HGB 8.2* 8.0*   HCT 26.5* 26.7*    234   MCV 97 97   RDW 18.1* 17.8*          Chemistries:  Recent Labs   Lab 04/15/23  0225 04/16/23  0315   * 134*   K 4.7 4.8   CL 97 98   CO2 28 29   BUN 20 20   CREATININE 0.9 0.9   CALCIUM 8.5* 9.2   ALBUMIN 1.8* 1.8*   PROT 5.5* 5.8*   BILITOT 0.4 0.4   ALKPHOS 94 103   ALT 19 20   AST 23 34         All pertinent labs within the past 24 hours have been reviewed.    Significant Imaging:  I have reviewed all pertinent imaging results/findings within the past 24 hours.

## 2023-04-16 NOTE — SUBJECTIVE & OBJECTIVE
Interval History:   No events since transfer of care to ICU team    Review of Systems   Constitutional:  Positive for activity change and fatigue.   Cardiovascular:  Negative for chest pain.   Gastrointestinal:  Negative for abdominal pain.     Objective:     Vital Signs (Most Recent):  Temp: 98.4 °F (36.9 °C) (04/16/23 0400)  Pulse: 77 (04/16/23 1100)  Resp: 11.9 (04/16/23 1100)  BP: (!) 107/58 (04/16/23 1100)  SpO2: 99 % (04/16/23 1100)   Vital Signs (24h Range):  Temp:  [98.4 °F (36.9 °C)-98.8 °F (37.1 °C)] 98.4 °F (36.9 °C)  Pulse:  [] 77  Resp:  [11.3-95] 11.9  SpO2:  [20 %-100 %] 99 %  BP: ()/(56-80) 107/58     Weight: 66.6 kg (146 lb 13.2 oz)  Body mass index is 22.32 kg/m².    Intake/Output Summary (Last 24 hours) at 4/16/2023 1120  Last data filed at 4/16/2023 0800  Gross per 24 hour   Intake 1110 ml   Output 600 ml   Net 510 ml        Physical Exam  Vitals reviewed.   Constitutional:       General: He is not in acute distress.     Appearance: He is well-developed and normal weight. He is ill-appearing. He is not toxic-appearing or diaphoretic.   HENT:      Head: Normocephalic and atraumatic.   Eyes:      General: No scleral icterus.     Pupils: Pupils are equal, round, and reactive to light.   Neck:      Thyroid: No thyromegaly.   Cardiovascular:      Rate and Rhythm: Normal rate and regular rhythm.      Heart sounds: No murmur heard.    No gallop.   Pulmonary:      Effort: Pulmonary effort is normal. No respiratory distress.      Breath sounds: Normal breath sounds. No stridor.   Abdominal:      General: Bowel sounds are normal. There is no distension.      Palpations: Abdomen is soft.      Tenderness: There is no abdominal tenderness.   Musculoskeletal:         General: No deformity. Normal range of motion.      Cervical back: Normal range of motion.   Skin:     General: Skin is warm.      Capillary Refill: Capillary refill takes less than 2 seconds.      Coloration: Skin is not jaundiced.       Findings: No bruising.   Neurological:      Mental Status: He is alert and oriented to person, place, and time.      Motor: Weakness present.      Gait: Gait abnormal.   Psychiatric:         Mood and Affect: Mood normal.         Behavior: Behavior normal.           Recent Results (from the past 24 hour(s))   CBC Auto Differential    Collection Time: 04/16/23  3:15 AM   Result Value Ref Range    WBC 5.14 3.90 - 12.70 K/uL    RBC 2.75 (L) 4.60 - 6.20 M/uL    Hemoglobin 8.0 (L) 14.0 - 18.0 g/dL    Hematocrit 26.7 (L) 40.0 - 54.0 %    MCV 97 82 - 98 fL    MCH 29.1 27.0 - 31.0 pg    MCHC 30.0 (L) 32.0 - 36.0 g/dL    RDW 17.8 (H) 11.5 - 14.5 %    Platelets 234 150 - 450 K/uL    MPV 10.2 9.2 - 12.9 fL    Immature Granulocytes 0.4 0.0 - 0.5 %    Gran # (ANC) 4.0 1.8 - 7.7 K/uL    Immature Grans (Abs) 0.02 0.00 - 0.04 K/uL    Lymph # 0.3 (L) 1.0 - 4.8 K/uL    Mono # 0.6 0.3 - 1.0 K/uL    Eos # 0.2 0.0 - 0.5 K/uL    Baso # 0.01 0.00 - 0.20 K/uL    nRBC 0 0 /100 WBC    Gran % 77.4 (H) 38.0 - 73.0 %    Lymph % 6.0 (L) 18.0 - 48.0 %    Mono % 12.1 4.0 - 15.0 %    Eosinophil % 3.9 0.0 - 8.0 %    Basophil % 0.2 0.0 - 1.9 %    Differential Method Automated    Comprehensive Metabolic Panel    Collection Time: 04/16/23  3:15 AM   Result Value Ref Range    Sodium 134 (L) 136 - 145 mmol/L    Potassium 4.8 3.5 - 5.1 mmol/L    Chloride 98 95 - 110 mmol/L    CO2 29 23 - 29 mmol/L    Glucose 113 (H) 70 - 110 mg/dL    BUN 20 8 - 23 mg/dL    Creatinine 0.9 0.5 - 1.4 mg/dL    Calcium 9.2 8.7 - 10.5 mg/dL    Total Protein 5.8 (L) 6.0 - 8.4 g/dL    Albumin 1.8 (L) 3.5 - 5.2 g/dL    Total Bilirubin 0.4 0.1 - 1.0 mg/dL    Alkaline Phosphatase 103 55 - 135 U/L    AST 34 10 - 40 U/L    ALT 20 10 - 44 U/L    Anion Gap 7 (L) 8 - 16 mmol/L    eGFR >60 >60 mL/min/1.73 m^2       Microbiology Results (last 7 days)       Procedure Component Value Units Date/Time    Blood culture [[id number]] Collected: 04/11/23 1240    Order Status: Completed Specimen:  "Blood from Antecubital, Left Arm Updated: 04/15/23 1503     Blood Culture, Routine No Growth after 4 days.    Narrative:      hard stick    Blood culture [[id number]] Collected: 04/11/23 1231    Order Status: Completed Specimen: Blood Updated: 04/15/23 1503     Blood Culture, Routine No Growth after 4 days.    Culture, Respiratory with Gram Stain [[id number]]  (Abnormal)  (Susceptibility) Collected: 04/11/23 1438    Order Status: Completed Specimen: Respiratory from Tracheal Aspirate Updated: 04/14/23 1238     Respiratory Culture PSEUDOMONAS AERUGINOSA  Moderate       Gram Stain (Respiratory) Rare WBC's     Gram Stain (Respiratory) Many Gram positive rods     Gram Stain (Respiratory) Rare Gram positive cocci in pairs             Imaging Results              X-Ray Chest AP Portable (Final result)  Result time 03/18/23 12:01:13      Final result by Mariano Soto MD (03/18/23 12:01:13)                   Impression:      No detrimental change when compared with 12/29/2022.      Electronically signed by: Mariano Soto MD  Date:    03/18/2023  Time:    12:01               Narrative:    EXAMINATION:  XR CHEST AP PORTABLE    CLINICAL HISTORY:  Provided history is "chest pain;  ".    TECHNIQUE:  One view of the chest.    COMPARISON:  12/29/2022.    FINDINGS:  Tracheostomy tube is present with the tip overlying the tracheal air column between the clavicular heads and sherine.  Cardiac silhouette is borderline enlarged and similar to prior study.  Atherosclerotic calcifications overlie the aortic arch.  Probable pulmonary emphysema.  Coarsened interstitial lung markings with bibasilar subsegmental atelectasis.  Small left and trace right pleural effusions, similar to the prior study.  No pneumothorax.  Aeration is overall similar when compared with 12/29/2022. Probable percutaneous gastrostomy tube overlies the left upper quadrant of the abdomen.                                        "

## 2023-04-16 NOTE — PLAN OF CARE
Problem: Adult Inpatient Plan of Care  Goal: Plan of Care Review  Outcome: Ongoing, Progressing     Problem: Adult Inpatient Plan of Care  Goal: Patient-Specific Goal (Individualized)  Outcome: Ongoing, Progressing  Flowsheets (Taken 4/16/2023 8805)  Anxieties, Fears or Concerns: Anxiety  Individualized Care Needs: Worries about trach coming out     Problem: Adult Inpatient Plan of Care  Goal: Optimal Comfort and Wellbeing  Outcome: Ongoing, Progressing     Problem: Skin Injury Risk Increased  Goal: Skin Health and Integrity  Outcome: Ongoing, Progressing     Problem: Infection  Goal: Absence of Infection Signs and Symptoms  Outcome: Ongoing, Progressing     Problem: Coping Ineffective  Goal: Effective Coping  Outcome: Ongoing, Progressing     Problem: Communication Impairment (Artificial Airway)  Goal: Effective Communication  Outcome: Ongoing, Progressing     Problem: Device-Related Complication Risk (Artificial Airway)  Goal: Optimal Device Function  Outcome: Ongoing, Progressing     Problem: Skin and Tissue Injury (Artificial Airway)  Goal: Absence of Device-Related Skin or Tissue Injury  Outcome: Ongoing, Progressing     Problem: Device-Related Complication Risk (Enteral Nutrition)  Goal: Safe, Effective Therapy Delivery  Outcome: Ongoing, Progressing

## 2023-04-16 NOTE — ASSESSMENT & PLAN NOTE
Vent Mode: PS/CPAP  Oxygen Concentration (%):  [35] 35  PEEP/CPAP:  [8 cmH20] 8 cmH20  Pressure Support:  [15 cmH20] 15 cmH20  Mean Airway Pressure:  [12.6 cmH20-15.8 cmH20] 15.8 cmH20    - hypoxia and hypercapnea - weaning O2 for goal sat of 88% and above  - tolerating PS well 24/7, trach collar trials daily   - COPD treatment as above  - sputum culture- with pseudomonas  - improving

## 2023-04-16 NOTE — NURSING
[facility]  ICU Shift Summary  Date: 4/16/2023      Prehospitalization: LTAC  Admit Date / LOS : 3/18/2023/ 28 days    Diagnosis: Acute on chronic respiratory failure with hypoxia and hypercapnia    Consults:        Active: Pulm CC       Needed: N/A     Code Status: Full Code   Advanced Directive: Not Received    LDA:  Lines/Drains/Airways       Peripherally Inserted Central Catheter Line  Duration             PICC Triple Lumen 03/20/23 2145 right basilic 26 days              Drain  Duration                  Gastrostomy/Enterostomy 01/04/22 Percutaneous endoscopic gastrostomy (PEG)  days    Male External Urinary Catheter 04/12/23 1900 3 days              Airway  Duration             Adult Surgical Airway 03/16/23 1014 Shiley Cuffed 6.0 30 days                  Central Lines/Site/Justification:Unable to Obtain/Maintain PIV  Urinary Cath/Order/Justification:Patient Does Not Have Urinary Catheter    Vasopressors/Infusions:        GOALS: Volume/ Hemodynamic: N/A                     RASS: +1 anxious, apprehensive but not aggressive    Pain Management: PO       Pain Controlled: yes     Rhythm: NSR    Respiratory Device: Vent    Vent Mode: PS/CPAP  Oxygen Concentration (%):  [35] 35  PEEP/CPAP:  [8 cmH20] 8 cmH20  Pressure Support:  [15 cmH20] 15 cmH20  Mean Airway Pressure:  [12.3 xxS42-69.4 cmH20] 15.3 cmH20                 Most Recent SBT/ SAT: Did not perform       MOVE Screen: PASS  ICU Liberation: not applicable    VTE Prophylaxis: Pharm  Mobility: Bedrest  Stress Ulcer Prophylaxis: Yes    Isolation: No active isolations    Dietary:   Current Diet Order   Procedures    Diet NPO Except for: Medication, Sips with Medication     Order Specific Question:   Except for     Answer:   Medication     Order Specific Question:   Except for     Answer:   Sips with Medication      Tolerance: not applicable  Advancement:     I & O (24h):    Intake/Output Summary (Last 24 hours) at 4/16/2023 0533  Last data  filed at 4/16/2023 0514  Gross per 24 hour   Intake 1860 ml   Output 1500 ml   Net 360 ml        Restraints: No    Significant Dates:  Post Op Date: N/A  Rescue Date: N/A  Imaging/ Diagnostics: N/A    Noteworthy Labs:  none    COVID Test: (--)  CBC/Anemia Labs: Coags:    Recent Labs   Lab 04/15/23  0225 04/16/23  0315   WBC 5.92 5.14   HGB 8.2* 8.0*   HCT 26.5* 26.7*    234   MCV 97 97   RDW 18.1* 17.8*    No results for input(s): PT, INR, APTT in the last 168 hours.     Chemistries:   Recent Labs   Lab 04/15/23  0225 04/16/23  0315   * 134*   K 4.7 4.8   CL 97 98   CO2 28 29   BUN 20 20   CREATININE 0.9 0.9   CALCIUM 8.5* 9.2   PROT 5.5* 5.8*   BILITOT 0.4 0.4   ALKPHOS 94 103   ALT 19 20   AST 23 34        Cardiac Enzymes: Ejection Fractions:    No results for input(s): CPK, CPKMB, MB, TROPONINI in the last 72 hours. EF   Date Value Ref Range Status   03/20/2023 60 % Final        POCT Glucose: HbA1c:    Recent Labs   Lab 04/14/23  1658 04/14/23  2010 04/15/23  0920   POCTGLUCOSE 148* 141* 144*    Hemoglobin A1C   Date Value Ref Range Status   09/27/2022 6.3 (H) 4.0 - 5.6 % Final     Comment:     ADA Screening Guidelines:  5.7-6.4%  Consistent with prediabetes  >or=6.5%  Consistent with diabetes    High levels of fetal hemoglobin interfere with the HbA1C  assay. Heterozygous hemoglobin variants (HbS, HgC, etc)do  not significantly interfere with this assay.   However, presence of multiple variants may affect accuracy.             ICU LOS 4d 20h  Level of Care: Critical Care    Chart Check: 12 HR Done  Shift Summary/Plan for the shift: none

## 2023-04-16 NOTE — PROGRESS NOTES
"[facility] Intensive Care  Critical Care Medicine  Progress Note    Patient Name: Fareed Richard Jr.  MRN: [id number]  Admission Date: 3/18/2023  Hospital Length of Stay: 28 days  Code Status: Full Code  Attending Provider: Kelby Sargent MD  Primary Care Provider: Kodi Tubbs MD   Principal Problem: Acute on chronic respiratory failure with hypoxia and hypercapnia    Subjective:     HPI:  Asked to evaluate for "hypotension and bleeding from trach site (replaced a few days ago)".    He was seen in ENT clinic by Dr. Smalls on 3/16 for routinely scheduled follow up and trach change.  He was having some thick yellow secretions with perhaps a hint of pink from the trach at time even before the trach exchange.  Subsequently, he was noted to have increase in congestion with more bloody-looking sputum.  He denies fever/chills/night sweats but has been more congested with dyspnea noted.  The day of presentation to the ED (3/18), secretions were frankly bloody => so he came to the ED.  He denies prior similar episodes.  He is on chronic aspirin and Plavix due to severe vascular disease with past stenting.    Review of past chest CT from October 2022 shows severe peripheral bullous changes, as well as centrilobular emphysema changes.  There is a modest left pleural effusion and left upper lobe nodular opacity.  There is bronchiectasis and diffuse micronodular changes in a tree-in-bud configuration to suggest chronic small airway inflammation/infection.  Chest CT at this time shows similar findings but now shows increased bibasilar air-space disease with consolidation/air bronchograms consistent with pneumonia.  Procalcitonin is normal.  Troponin has bumped up since admission to suggest possible demand ischemia in the setting of worsening anemia and hypotension.        Patient previously seen in Pulmonary Clinic by Dr. Wadsworth in October 2022.  From Dr. Wadsworth's note:    Fareed Richard Jr. is a 72 y.o. male who presents for " evaluation of chronic hypoxemic respiratory failure. He coughs daily, particularly in the evening. The cough is productive of yellow mucus which he expectorates without difficult via his trach. He did not experience chronic cough prior to his trach. He experiences chronic dyspnea on exertion. He takes wixela inhaler 1 puff BID (still inhales through his mouth) & an albuterol nebulizer. He has been taking prednisone for the past six weeks (for copd?). He takes glycopyrrolate due to constant oral secretions.      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 S/p tracheostomy, neck dissection, & total glossectomy January 2022.     HeSince his last visit he was hospitalized for acute respiratory failure requiring intubation and had an extended hospital course; admitted 4/19/22 and discharged 5/12/22.  3 month post-treatment PET CT reviewed; no evidence of local residual or recurrent disease.  Pulmonary findings likely reflect sequelae of recent hospitalization.  Concern for active inflammation going on; agree with starting steroids and getting PFTs.  Will need repeat CT chest in 8 weeks and follow up with pulmonology.     PMH: CAD status post RCA PCI in 2000, carotid stenosis status post carotid enterectomy in 2018, PAD status post bilateral femoral atherectomy and hypertension.     He is a former 2PPD smoker; he quit six years ago.        Hospital/ICU Course:  No notes on file    Interval History: Mr. Richard has continued to do well. No issues overnight.       Objective:     Vital Signs (Most Recent):  Temp: 98.4 °F (36.9 °C) (04/16/23 0400)  Pulse: 77 (04/16/23 1100)  Resp: 11.9 (04/16/23 1100)  BP: (!) 107/58 (04/16/23 1100)  SpO2: 99 % (04/16/23 1100) Vital Signs (24h Range):  Temp:  [98.4 °F (36.9 °C)-98.8 °F (37.1 °C)] 98.4 °F (36.9 °C)  Pulse:  [] 77  Resp:  [11.3-95] 11.9  SpO2:  [20 %-100 %] 99 %  BP: ()/(56-80) 107/58     Weight: 66.6 kg (146 lb 13.2 oz)  Body mass  index is 22.32 kg/m².      Intake/Output Summary (Last 24 hours) at 4/16/2023 1144  Last data filed at 4/16/2023 0800  Gross per 24 hour   Intake 1110 ml   Output 600 ml   Net 510 ml         Physical Exam  Vitals reviewed.   Constitutional:       Appearance: He is ill-appearing (chronically ill). He is not toxic-appearing.   HENT:      Head: Normocephalic and atraumatic.      Nose: Nose normal. No congestion.      Mouth/Throat:      Mouth: Mucous membranes are moist.      Pharynx: Oropharynx is clear. No oropharyngeal exudate.   Eyes:      Extraocular Movements: Extraocular movements intact.      Conjunctiva/sclera: Conjunctivae normal.      Pupils: Pupils are equal, round, and reactive to light.   Neck:      Comments: Cuffed 8 trach   Cardiovascular:      Rate and Rhythm: Normal rate and regular rhythm.      Pulses: Normal pulses.      Heart sounds: No murmur heard.  Pulmonary:      Effort: Tachypnea present. No respiratory distress.      Breath sounds: No wheezing or rhonchi.      Comments: Increased respiratory effort when PS is weaned.   Abdominal:      General: Abdomen is flat. Bowel sounds are normal.      Palpations: Abdomen is soft.   Musculoskeletal:         General: No swelling.   Skin:     General: Skin is warm and dry.      Capillary Refill: Capillary refill takes less than 2 seconds.   Neurological:      General: No focal deficit present.      Mental Status: He is oriented to person, place, and time.      Comments: Not responding to commands or voice      Review of Systems    Vents:  Vent Mode: PS/CPAP (04/16/23 0805)  Ventilator Initiated: Yes (04/11/23 0936)  Set Rate: 24 BPM (04/14/23 0821)  Vt Set: 400 mL (04/14/23 0821)  Pressure Support: 15 cmH20 (04/16/23 0805)  PEEP/CPAP: 8 cmH20 (04/16/23 0805)  Oxygen Concentration (%): 35 (04/16/23 1100)  Peak Airway Pressure: 24.5 cmH20 (04/16/23 0805)  Total Ve: 11.15 L/m (04/16/23 0805)  F/VT Ratio<105 (RSBI): 107.63 (04/16/23 0805)    Lines/Drains/Airways        Peripherally Inserted Central Catheter Line  Duration             PICC Triple Lumen 03/20/23 2145 right basilic 26 days              Drain  Duration                  Gastrostomy/Enterostomy 01/04/22 Percutaneous endoscopic gastrostomy (PEG)  days    Male External Urinary Catheter 04/12/23 1900 3 days              Airway  Duration             Adult Surgical Airway 03/16/23 1014 Shiley Cuffed 6.0 31 days                    Significant Labs:    CBC/Anemia Profile:  Recent Labs   Lab 04/15/23  0225 04/16/23  0315   WBC 5.92 5.14   HGB 8.2* 8.0*   HCT 26.5* 26.7*    234   MCV 97 97   RDW 18.1* 17.8*          Chemistries:  Recent Labs   Lab 04/15/23  0225 04/16/23  0315   * 134*   K 4.7 4.8   CL 97 98   CO2 28 29   BUN 20 20   CREATININE 0.9 0.9   CALCIUM 8.5* 9.2   ALBUMIN 1.8* 1.8*   PROT 5.5* 5.8*   BILITOT 0.4 0.4   ALKPHOS 94 103   ALT 19 20   AST 23 34         All pertinent labs within the past 24 hours have been reviewed.    Significant Imaging:  I have reviewed all pertinent imaging results/findings within the past 24 hours.      ABG  Recent Labs   Lab 04/11/23  1030   PH 7.224*   PO2 56   PCO2 98.2*   HCO3 40.6*   BE 10     Assessment/Plan:     ENT  Tracheostomy present  Tracheostomy in place since surgical resection for head/neck cancer in January 2022.  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    No signs of ongoing bleeding at the moment     Pulmonary  * Acute on chronic respiratory failure with hypoxia and hypercapnia  Vent Mode: PS/CPAP  Oxygen Concentration (%):  [35] 35  PEEP/CPAP:  [8 cmH20] 8 cmH20  Pressure Support:  [15 cmH20] 15 cmH20  Mean Airway Pressure:  [12.6 cmH20-15.8 cmH20] 15.8 cmH20    - hypoxia and hypercapnea - weaning O2 for goal sat of 88% and above  - tolerating PS well 24/7, trach collar trials daily   - COPD treatment as above  - sputum culture- with pseudomonas  -  improving    Hemoptysis  Likely secondary to lower respiratory tract infection in the setting of ASA/Plavix.  It seems less likely to be physical complication of the tracheostomy tube itself.    · Antibiotics for pneumonia.  · Check sputum for culture, including AFB for possible MAC infection.  · Respiratory viral panel.  · OK to resume anti-platelet medications; monitor for bleeding   · No need for additional nebulized TXA.  No bleeding since 3/19  · Appreciate ENT evaluation     COPD exacerbation  Continue duonebs but not suspecting this as the primary  at this time.  - thick secretions have been an issue, adding cpt and mucomyst nebs to assist with thinning secretions for clearance.   - follow up sputum culture with pan sensitive pseudomonas     Critical Care Time: 35 minutes  Critical secondary to Patient has a condition that poses threat to life and bodily function: Severe Respiratory Distress      Critical care was time spent personally by me on the following activities: development of treatment plan with patient or surrogate and bedside caregivers, discussions with consultants, evaluation of patient's response to treatment, examination of patient, ordering and performing treatments and interventions, ordering and review of laboratory studies, ordering and review of radiographic studies, pulse oximetry, re-evaluation of patient's condition. This critical care time did not overlap with that of any other provider or involve time for any procedures.     Alessandra Meza MD  Critical Care Medicine  [facility] - Intensive Care

## 2023-04-17 LAB
ALBUMIN SERPL BCP-MCNC: 1.9 G/DL (ref 3.5–5.2)
ALP SERPL-CCNC: 108 U/L (ref 55–135)
ALT SERPL W/O P-5'-P-CCNC: 19 U/L (ref 10–44)
ANION GAP SERPL CALC-SCNC: 9 MMOL/L (ref 8–16)
AST SERPL-CCNC: 31 U/L (ref 10–40)
BASOPHILS # BLD AUTO: 0.02 K/UL (ref 0–0.2)
BASOPHILS NFR BLD: 0.4 % (ref 0–1.9)
BILIRUB SERPL-MCNC: 0.4 MG/DL (ref 0.1–1)
BUN SERPL-MCNC: 20 MG/DL (ref 8–23)
CALCIUM SERPL-MCNC: 9.3 MG/DL (ref 8.7–10.5)
CHLORIDE SERPL-SCNC: 98 MMOL/L (ref 95–110)
CO2 SERPL-SCNC: 26 MMOL/L (ref 23–29)
CREAT SERPL-MCNC: 0.8 MG/DL (ref 0.5–1.4)
DIFFERENTIAL METHOD: ABNORMAL
EOSINOPHIL # BLD AUTO: 0.2 K/UL (ref 0–0.5)
EOSINOPHIL NFR BLD: 4.5 % (ref 0–8)
ERYTHROCYTE [DISTWIDTH] IN BLOOD BY AUTOMATED COUNT: 17.2 % (ref 11.5–14.5)
EST. GFR  (NO RACE VARIABLE): >60 ML/MIN/1.73 M^2
GLUCOSE SERPL-MCNC: 114 MG/DL (ref 70–110)
HCT VFR BLD AUTO: 27.7 % (ref 40–54)
HGB BLD-MCNC: 8.9 G/DL (ref 14–18)
IMM GRANULOCYTES # BLD AUTO: 0.06 K/UL (ref 0–0.04)
IMM GRANULOCYTES NFR BLD AUTO: 1.1 % (ref 0–0.5)
LYMPHOCYTES # BLD AUTO: 0.5 K/UL (ref 1–4.8)
LYMPHOCYTES NFR BLD: 9.2 % (ref 18–48)
MCH RBC QN AUTO: 30.5 PG (ref 27–31)
MCHC RBC AUTO-ENTMCNC: 32.1 G/DL (ref 32–36)
MCV RBC AUTO: 95 FL (ref 82–98)
MONOCYTES # BLD AUTO: 0.7 K/UL (ref 0.3–1)
MONOCYTES NFR BLD: 13.5 % (ref 4–15)
NEUTROPHILS # BLD AUTO: 3.8 K/UL (ref 1.8–7.7)
NEUTROPHILS NFR BLD: 71.3 % (ref 38–73)
NRBC BLD-RTO: 0 /100 WBC
PLATELET # BLD AUTO: 255 K/UL (ref 150–450)
PMV BLD AUTO: 9.9 FL (ref 9.2–12.9)
POCT GLUCOSE: 113 MG/DL (ref 70–110)
POTASSIUM SERPL-SCNC: 4.8 MMOL/L (ref 3.5–5.1)
PROT SERPL-MCNC: 6.3 G/DL (ref 6–8.4)
RBC # BLD AUTO: 2.92 M/UL (ref 4.6–6.2)
SODIUM SERPL-SCNC: 133 MMOL/L (ref 136–145)
WBC # BLD AUTO: 5.33 K/UL (ref 3.9–12.7)

## 2023-04-17 PROCEDURE — 63600175 PHARM REV CODE 636 W HCPCS: Performed by: STUDENT IN AN ORGANIZED HEALTH CARE EDUCATION/TRAINING PROGRAM

## 2023-04-17 PROCEDURE — 94640 AIRWAY INHALATION TREATMENT: CPT

## 2023-04-17 PROCEDURE — 99900026 HC AIRWAY MAINTENANCE (STAT)

## 2023-04-17 PROCEDURE — 25000003 PHARM REV CODE 250: Performed by: HOSPITALIST

## 2023-04-17 PROCEDURE — 27000221 HC OXYGEN, UP TO 24 HOURS

## 2023-04-17 PROCEDURE — 25000003 PHARM REV CODE 250: Performed by: STUDENT IN AN ORGANIZED HEALTH CARE EDUCATION/TRAINING PROGRAM

## 2023-04-17 PROCEDURE — 25000242 PHARM REV CODE 250 ALT 637 W/ HCPCS: Performed by: INTERNAL MEDICINE

## 2023-04-17 PROCEDURE — 85025 COMPLETE CBC W/AUTO DIFF WBC: CPT | Performed by: HOSPITALIST

## 2023-04-17 PROCEDURE — 94761 N-INVAS EAR/PLS OXIMETRY MLT: CPT

## 2023-04-17 PROCEDURE — 20000000 HC ICU ROOM

## 2023-04-17 PROCEDURE — 99900035 HC TECH TIME PER 15 MIN (STAT)

## 2023-04-17 PROCEDURE — 94003 VENT MGMT INPAT SUBQ DAY: CPT

## 2023-04-17 PROCEDURE — 36415 COLL VENOUS BLD VENIPUNCTURE: CPT | Performed by: HOSPITALIST

## 2023-04-17 PROCEDURE — 99291 PR CRITICAL CARE, E/M 30-74 MINUTES: ICD-10-PCS | Mod: ,,, | Performed by: INTERNAL MEDICINE

## 2023-04-17 PROCEDURE — 99291 CRITICAL CARE FIRST HOUR: CPT | Mod: ,,, | Performed by: INTERNAL MEDICINE

## 2023-04-17 PROCEDURE — 25000242 PHARM REV CODE 250 ALT 637 W/ HCPCS: Performed by: HOSPITALIST

## 2023-04-17 PROCEDURE — 63600175 PHARM REV CODE 636 W HCPCS: Performed by: HOSPITALIST

## 2023-04-17 PROCEDURE — 94668 MNPJ CHEST WALL SBSQ: CPT

## 2023-04-17 PROCEDURE — A4216 STERILE WATER/SALINE, 10 ML: HCPCS | Performed by: HOSPITALIST

## 2023-04-17 PROCEDURE — 80053 COMPREHEN METABOLIC PANEL: CPT | Performed by: HOSPITALIST

## 2023-04-17 RX ADMIN — METOPROLOL TARTRATE 25 MG: 25 TABLET, FILM COATED ORAL at 08:04

## 2023-04-17 RX ADMIN — CHLORHEXIDINE GLUCONATE 0.12% ORAL RINSE 15 ML: 1.2 LIQUID ORAL at 08:04

## 2023-04-17 RX ADMIN — ACETYLCYSTEINE 4 ML: 100 SOLUTION ORAL; RESPIRATORY (INHALATION) at 12:04

## 2023-04-17 RX ADMIN — IPRATROPIUM BROMIDE AND ALBUTEROL SULFATE 3 ML: 2.5; .5 SOLUTION RESPIRATORY (INHALATION) at 08:04

## 2023-04-17 RX ADMIN — OXYCODONE HYDROCHLORIDE 5 MG: 5 TABLET ORAL at 08:04

## 2023-04-17 RX ADMIN — CEFEPIME HYDROCHLORIDE 2 G: 2 INJECTION, SOLUTION INTRAVENOUS at 05:04

## 2023-04-17 RX ADMIN — ACETYLCYSTEINE 4 ML: 100 SOLUTION ORAL; RESPIRATORY (INHALATION) at 08:04

## 2023-04-17 RX ADMIN — IPRATROPIUM BROMIDE AND ALBUTEROL SULFATE 3 ML: 2.5; .5 SOLUTION RESPIRATORY (INHALATION) at 11:04

## 2023-04-17 RX ADMIN — SENNOSIDES AND DOCUSATE SODIUM 2 TABLET: 50; 8.6 TABLET ORAL at 08:04

## 2023-04-17 RX ADMIN — LORAZEPAM 0.5 MG: 2 INJECTION INTRAMUSCULAR; INTRAVENOUS at 08:04

## 2023-04-17 RX ADMIN — Medication 10 ML: at 01:04

## 2023-04-17 RX ADMIN — ATORVASTATIN CALCIUM 80 MG: 40 TABLET, FILM COATED ORAL at 08:04

## 2023-04-17 RX ADMIN — ACETYLCYSTEINE 4 ML: 100 SOLUTION ORAL; RESPIRATORY (INHALATION) at 11:04

## 2023-04-17 RX ADMIN — MELATONIN TAB 3 MG 9 MG: 3 TAB at 08:04

## 2023-04-17 RX ADMIN — Medication 10 ML: at 12:04

## 2023-04-17 RX ADMIN — IPRATROPIUM BROMIDE AND ALBUTEROL SULFATE 3 ML: 2.5; .5 SOLUTION RESPIRATORY (INHALATION) at 04:04

## 2023-04-17 RX ADMIN — SULFAMETHOXAZOLE AND TRIMETHOPRIM 1 TABLET: 800; 160 TABLET ORAL at 08:04

## 2023-04-17 RX ADMIN — IPRATROPIUM BROMIDE AND ALBUTEROL SULFATE 3 ML: 2.5; .5 SOLUTION RESPIRATORY (INHALATION) at 12:04

## 2023-04-17 RX ADMIN — CLOPIDOGREL BISULFATE 75 MG: 75 TABLET ORAL at 08:04

## 2023-04-17 RX ADMIN — ASPIRIN 81 MG CHEWABLE TABLET 81 MG: 81 TABLET CHEWABLE at 08:04

## 2023-04-17 RX ADMIN — FAMOTIDINE 20 MG: 10 INJECTION, SOLUTION INTRAVENOUS at 08:04

## 2023-04-17 RX ADMIN — HYDROXYZINE HYDROCHLORIDE 25 MG: 25 TABLET ORAL at 08:04

## 2023-04-17 RX ADMIN — FERROUS SULFATE TAB 325 MG (65 MG ELEMENTAL FE) 1 EACH: 325 (65 FE) TAB at 08:04

## 2023-04-17 RX ADMIN — Medication 10 ML: at 04:04

## 2023-04-17 RX ADMIN — ACETYLCYSTEINE 4 ML: 100 SOLUTION ORAL; RESPIRATORY (INHALATION) at 04:04

## 2023-04-17 RX ADMIN — SULFAMETHOXAZOLE AND TRIMETHOPRIM 1 TABLET: 800; 160 TABLET ORAL at 09:04

## 2023-04-17 RX ADMIN — Medication 10 ML: at 06:04

## 2023-04-17 RX ADMIN — CEFEPIME HYDROCHLORIDE 2 G: 2 INJECTION, SOLUTION INTRAVENOUS at 01:04

## 2023-04-17 RX ADMIN — CEFEPIME HYDROCHLORIDE 2 G: 2 INJECTION, SOLUTION INTRAVENOUS at 08:04

## 2023-04-17 NOTE — PLAN OF CARE
Problem: Adult Inpatient Plan of Care  Goal: Plan of Care Review  Outcome: Ongoing, Progressing  Goal: Patient-Specific Goal (Individualized)  Outcome: Ongoing, Progressing  Goal: Absence of Hospital-Acquired Illness or Injury  Outcome: Ongoing, Progressing  Goal: Optimal Comfort and Wellbeing  Outcome: Ongoing, Progressing  Goal: Readiness for Transition of Care  Outcome: Ongoing, Progressing     Problem: Fall Injury Risk  Goal: Absence of Fall and Fall-Related Injury  Outcome: Ongoing, Progressing     Problem: Skin Injury Risk Increased  Goal: Skin Health and Integrity  Outcome: Ongoing, Progressing     Problem: Infection  Goal: Absence of Infection Signs and Symptoms  Outcome: Ongoing, Progressing     Problem: Coping Ineffective  Goal: Effective Coping  Outcome: Ongoing, Progressing     Problem: Impaired Wound Healing  Goal: Optimal Wound Healing  Outcome: Ongoing, Progressing     Problem: Communication Impairment (Artificial Airway)  Goal: Effective Communication  Outcome: Ongoing, Progressing     Problem: Device-Related Complication Risk (Artificial Airway)  Goal: Optimal Device Function  Outcome: Ongoing, Progressing     Problem: Skin and Tissue Injury (Artificial Airway)  Goal: Absence of Device-Related Skin or Tissue Injury  Outcome: Ongoing, Progressing     Problem: Aspiration (Enteral Nutrition)  Goal: Absence of Aspiration Signs and Symptoms  Outcome: Ongoing, Progressing     Problem: Device-Related Complication Risk (Enteral Nutrition)  Goal: Safe, Effective Therapy Delivery  Outcome: Ongoing, Progressing     Problem: Feeding Intolerance (Enteral Nutrition)  Goal: Feeding Tolerance  Outcome: Ongoing, Progressing     Problem: Mobility Impairment  Goal: Optimal Mobility  Outcome: Ongoing, Progressing     Problem: Fluid Imbalance (Pneumonia)  Goal: Fluid Balance  Outcome: Ongoing, Progressing     Problem: Infection (Pneumonia)  Goal: Resolution of Infection Signs and Symptoms  Outcome: Ongoing,  Progressing     Problem: Respiratory Compromise (Pneumonia)  Goal: Effective Oxygenation and Ventilation  Outcome: Ongoing, Progressing     Problem: Communication Impairment (Mechanical Ventilation, Invasive)  Goal: Effective Communication  Outcome: Ongoing, Progressing     Problem: Device-Related Complication Risk (Mechanical Ventilation, Invasive)  Goal: Optimal Device Function  Outcome: Ongoing, Progressing     Problem: Inability to Wean (Mechanical Ventilation, Invasive)  Goal: Mechanical Ventilation Liberation  Outcome: Ongoing, Progressing     Problem: Nutrition Impairment (Mechanical Ventilation, Invasive)  Goal: Optimal Nutrition Delivery  Outcome: Ongoing, Progressing     Problem: Skin and Tissue Injury (Mechanical Ventilation, Invasive)  Goal: Absence of Device-Related Skin and Tissue Injury  Outcome: Ongoing, Progressing     Problem: Ventilator-Induced Lung Injury (Mechanical Ventilation, Invasive)  Goal: Absence of Ventilator-Induced Lung Injury  Outcome: Ongoing, Progressing

## 2023-04-17 NOTE — ASSESSMENT & PLAN NOTE
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  -intermittent cuff leak despite high cuff pressure.  Await ENT inputs.

## 2023-04-17 NOTE — SUBJECTIVE & OBJECTIVE
Interval History: Mr. Richard has continued to do well. Tolerating vent support well.  Intermittent cuff leak.        Objective:     Vital Signs (Most Recent):  Temp: 98.7 °F (37.1 °C) (04/17/23 0730)  Pulse: 83 (04/17/23 1146)  Resp: 15 (04/17/23 1146)  BP: 112/71 (04/17/23 0930)  SpO2: 100 % (04/17/23 1146) Vital Signs (24h Range):  Temp:  [97.9 °F (36.6 °C)-98.7 °F (37.1 °C)] 98.7 °F (37.1 °C)  Pulse:  [] 83  Resp:  [14-30.2] 15  SpO2:  [93 %-100 %] 100 %  BP: (103-152)/(52-77) 112/71     Weight: 66.8 kg (147 lb 4.3 oz)  Body mass index is 22.39 kg/m².      Intake/Output Summary (Last 24 hours) at 4/17/2023 1239  Last data filed at 4/17/2023 1000  Gross per 24 hour   Intake 1758.47 ml   Output 2000 ml   Net -241.53 ml         Physical Exam  Vitals reviewed.   Constitutional:       Appearance: He is ill-appearing (chronically ill). He is not toxic-appearing.   HENT:      Head: Normocephalic and atraumatic.      Nose: Nose normal. No congestion.      Mouth/Throat:      Mouth: Mucous membranes are moist.      Pharynx: Oropharynx is clear. No oropharyngeal exudate.   Eyes:      Extraocular Movements: Extraocular movements intact.      Conjunctiva/sclera: Conjunctivae normal.      Pupils: Pupils are equal, round, and reactive to light.   Neck:      Comments: Cuffed 8 trach   Cardiovascular:      Rate and Rhythm: Normal rate and regular rhythm.      Pulses: Normal pulses.      Heart sounds: No murmur heard.  Pulmonary:      Effort: Tachypnea present. No respiratory distress.      Breath sounds: No wheezing or rhonchi.      Comments: Increased respiratory effort when PS is weaned.   Abdominal:      General: Abdomen is flat. Bowel sounds are normal.      Palpations: Abdomen is soft.   Musculoskeletal:         General: No swelling.   Skin:     General: Skin is warm and dry.      Capillary Refill: Capillary refill takes less than 2 seconds.   Neurological:      General: No focal deficit present.      Mental Status: He  likely custom. is oriented to person, place, and time.      Comments: Not responding to commands or voice      Review of Systems    Vents:  Vent Mode: PS/CPAP (04/17/23 1146)  Ventilator Initiated: Yes (04/11/23 0936)  Set Rate: 24 BPM (04/14/23 0821)  Vt Set: 400 mL (04/14/23 0821)  Pressure Support: 12 cmH20 (04/17/23 1146)  PEEP/CPAP: 8 cmH20 (04/17/23 1146)  Oxygen Concentration (%): 35 (04/17/23 1146)  Peak Airway Pressure: 21.1 cmH20 (04/17/23 1146)  Total Ve: 13.5 L/m (04/17/23 1146)  F/VT Ratio<105 (RSBI): (!) 43 (04/17/23 1146)    Lines/Drains/Airways       Peripherally Inserted Central Catheter Line  Duration             PICC Triple Lumen 03/20/23 2145 right basilic 27 days              Drain  Duration                  Gastrostomy/Enterostomy 01/04/22 Percutaneous endoscopic gastrostomy (PEG)  days    Male External Urinary Catheter 04/12/23 1900 4 days              Airway  Duration             Adult Surgical Airway 03/16/23 1014 Shiley Cuffed 6.0 32 days                    Significant Labs:    CBC/Anemia Profile:  Recent Labs   Lab 04/16/23 0315 04/17/23  0427   WBC 5.14 5.33   HGB 8.0* 8.9*   HCT 26.7* 27.7*    255   MCV 97 95   RDW 17.8* 17.2*          Chemistries:  Recent Labs   Lab 04/16/23 0315 04/17/23  0645   * 133*   K 4.8 4.8   CL 98 98   CO2 29 26   BUN 20 20   CREATININE 0.9 0.8   CALCIUM 9.2 9.3   ALBUMIN 1.8* 1.9*   PROT 5.8* 6.3   BILITOT 0.4 0.4   ALKPHOS 103 108   ALT 20 19   AST 34 31         All pertinent labs within the past 24 hours have been reviewed.    Significant Imaging:  I have reviewed all pertinent imaging results/findings within the past 24 hours.

## 2023-04-17 NOTE — ASSESSMENT & PLAN NOTE
Troponin elevated when pt was upgraded to the ICU. At that time it was thought to be due to demand, however he had persistent chest pain concerning for ACS. Was started on heparin gtt and tolerated. Subsequently went to [facility] which showed diffuse disease. Pt not a candidate for CABG. He subsequently underwent PCI of LAD and LCx.  - Continue asa, plavix, statin

## 2023-04-17 NOTE — PT/OT/SLP PROGRESS
Occupational Therapy      Patient Name:  Fareed Richard Jr.   MRN:  [id number]    Pt continues to require MD maria del rosario aware during ICU rounding that therapy orders to be discontinued. Please re-consult when pt is appropriate. Thank you.    4/17/2023

## 2023-04-17 NOTE — PLAN OF CARE
Recommendations    1. Continue with TF recs; Monitor diet advancements.   2. Advance diet as tolerated and appropriate.   3. Monitor weight changes; check weekly weights.    Goals: 1. Diet advancement by RD follow up.  Nutrition Goal Status: goal not met  Communication of RD Recs: other (comment) (POC)    Assessment and Plan    Nutrition Problem  Inadequate PO intake     Related to (etiology):   Cancer     Signs and Symptoms (as evidenced by):   NPO      Interventions/Recommendations (treatment strategy):  Collaboration with medical providers  TF recommendations      Nutrition Diagnosis Status:   Continues

## 2023-04-17 NOTE — PT/OT/SLP PROGRESS
Physical Therapy      Patient Name:  Fareed Richard Jr.   MRN:  [id number]    Pt continued to be on the vent, MD aware during ICU rounding that PT orders to be discontinued. Please re-consult when pt is appropriate. Thank you.

## 2023-04-17 NOTE — ASSESSMENT & PLAN NOTE
Vent Mode: PS/CPAP  Oxygen Concentration (%):  [] 35  Resp Rate Total:  [14 br/min-15 br/min] 15 br/min  PEEP/CPAP:  [8 cmH20] 8 cmH20  Pressure Support:  [12 cmH20] 12 cmH20  Mean Airway Pressure:  [10 scH36-58.9 cmH20] 12.3 cmH20    - hypoxia and hypercapnea - weaning O2 for goal sat of 88% and above  - tolerating PS well 24/7  - trach collar trials during the day  - COPD treatment as above  - sputum culture- with pseudomonas  - improving

## 2023-04-17 NOTE — NURSING
[facility] - [place]  Nurses Note -- 4 Eyes      4/17/2023       Skin assessed on: Q Shift      [x] No Pressure Injuries Present    [x]Prevention Measures Documented    [] Yes LDA  for Pressure Injury Previously documented     [] Yes New Pressure Injury Discovered   [] LDA for New Pressure Injury Added      Attending RN:  Zunilda Leavitt RN     Second RN:  Gemma Beaulieu RN

## 2023-04-17 NOTE — PROGRESS NOTES
"[facility] Intensive Care  Adult Nutrition  Progress Note    SUMMARY       Recommendations    1. Continue with TF recs; Monitor diet advancements.   2. Advance diet as tolerated and appropriate.   3. Monitor weight changes; check weekly weights.    Goals: 1. Diet advancement by RD follow up.  Nutrition Goal Status: goal not met  Communication of RD Recs: other (comment) (POC)    Assessment and Plan    Nutrition Problem  Inadequate PO intake     Related to (etiology):   Cancer     Signs and Symptoms (as evidenced by):   NPO      Interventions/Recommendations (treatment strategy):  Collaboration with medical providers  TF recommendations      Nutrition Diagnosis Status:   Continues    Reason for Assessment    Reason For Assessment: RD follow-up  Diagnosis:  (acute blood loss anemia)  Relevant Medical History: cancer, COPD, hyperlipidemia  Interdisciplinary Rounds: did not attend  General Information Comments: RD follow up. Pt diet advanced to cardiac diet but is now NPO. Pt has no recent weight changes. If pt remains NPO, consider previous TF recs. Previous recs of TF isosource 1.5. Continue to advance TF to GR 40 mLhr as tolerated. NFPE on 4/10 shows no signs of malnutrition at this time.  Nutrition Discharge Planning: Pending medical course    Nutrition Risk Screen    Nutrition Risk Screen: tube feeding or parenteral nutrition    Nutrition/Diet History    Spiritual, Cultural Beliefs, Orthodox Practices, Values that Affect Care: no  Food Allergies: NKFA    Anthropometrics    Temp: 98.4 °F (36.9 °C)  Height Method: Stated  Height: 5' 8" (172.7 cm)  Height (inches): 68 in  Weight Method: Bed Scale  Weight: 66.8 kg (147 lb 4.3 oz)  Weight (lb): 147.27 lb  Ideal Body Weight (IBW), Male: 154 lb  % Ideal Body Weight, Male (lb): 97.4 %  BMI (Calculated): 22.4  BMI Grade: 18.5-24.9 - normal       Lab/Procedures/Meds    Pertinent Labs Reviewed: reviewed  Pertinent Labs Comments  BMP  Lab Results   Component Value Date    NA " 133 (L) 2023    K 4.8 2023    CL 98 2023    CO2 26 2023    BUN 20 2023    CREATININE 0.8 2023    CALCIUM 9.3 2023    ANIONGAP 9 2023    EGFRNORACEVR >60 2023      Pertinent Medications Reviewed: reviewed  Current Outpatient Medications   Medication Instructions    amLODIPine (NORVASC) 10 mg, Oral    atorvastatin (LIPITOR) 20 mg, Per G Tube, Daily    bisacodyL (DULCOLAX) 10 mg, Rectal, Daily PRN    clopidogreL (PLAVIX) 75 mg, Oral, Daily    glycopyrrolate (CUVPOSA) 1 mg/5 mL (0.2 mg/mL) Soln Take 5 mLs (1 mg total) by mouth 3 (three) times daily as needed (TO REDUCE SECRETIONS).    guaiFENesin 400 mg, Oral, Every 4 hours PRN    hydrOXYzine HCL (ATARAX) 25 MG tablet SMARTSI.5 Tablet(s) Gastro Tube Every 8 Hours PRN    melatonin (MELATIN) 6 mg, Per G Tube, Nightly    metoprolol succinate (TOPROL-XL) 200 mg, Oral, 2 times daily    multivit-min/FA/lycopen/lutein (CENTRUM SILVER MEN ORAL) Oral    omeprazole (PRILOSEC) 40 MG capsule Take 40 mg (contents of one capsule) twice daily through PEG tube. Mix contents of capsule with 10 mL applesauce.    oxyCODONE (ROXICODONE) 5 mg, Per G Tube, Every 4 hours PRN    polyethylene glycol (GLYCOLAX) 17 g, Per G Tube, 2 times daily    sodium chloride (OCEAN) 0.65 % nasal spray 1 spray, Nasal, As needed (PRN)    sodium chloride 3% 3 % nebulizer solution 4 mLs, Nebulization, 2 times daily    WIXELA INHUB 250-50 mcg/dose diskus inhaler SMARTSI Puff(s) By Mouth Every 12 Hours        Estimated/Assessed Needs    Weight Used For Calorie Calculations: 68 kg (150 lb)  Energy Calorie Requirements (kcal): 1533 kcal  Energy Need Method: [place]oc[place]ontas-St Epifanioor (x 1.1)  Protein Requirements: 68 g (1 g/kg)  Weight Used For Protein Calculations: 68 kg (150 lb)        RDA Method (mL): 1533         Nutrition Prescription Ordered    Current Diet Order: tube feed    Evaluation of Received Nutrient/Fluid Intake    I/O: +9 L  Energy Calories Required:  meeting needs  Protein Required: meeting needs  Fluid Required: meeting needs  Comments: LBM 4/13/23  Tolerance: tolerating  % Intake of Estimated Energy Needs: 0 - 25 %  % Meal Intake: NPO    Nutrition Risk    Level of Risk/Frequency of Follow-up: low     Monitor and Evaluation    Food and Nutrient Intake: energy intake, food and beverage intake, enteral nutrition intake, parenteral nutrition intake  Food and Nutrient Adminstration: diet order, enteral and parenteral nutrition administration  Knowledge/Beliefs/Attitudes: food and nutrition knowledge/skill, beliefs and attitudes  Physical Activity and Function: nutrition-related ADLs and IADLs, factors affecting access to physical activity  Anthropometric Measurements: weight, weight change, body mass index  Biochemical Data, Medical Tests and Procedures: electrolyte and renal panel, gastrointestinal profile, glucose/endocrine profile, inflammatory profile, lipid profile  Nutrition-Focused Physical Findings: overall appearance     Nutrition Follow-Up    RD Follow-up?: Yes    Ambar Dutta, Registration Eligible, Provisional LDN

## 2023-04-17 NOTE — PROGRESS NOTES
[facility] Intensive Care  Pulmonology  Progress Note    Patient Name: Fareed Richard Jr.  MRN: [id number]  Admission Date: 3/18/2023  Hospital Length of Stay: 29 days  Code Status: Full Code  Attending Provider: Kelby Sargent MD  Primary Care Provider: Kodi Tubbs MD   Principal Problem: Acute on chronic respiratory failure with hypoxia and hypercapnia    Subjective:     Interval History: Mr. Richard has continued to do well. Tolerating vent support well.  Intermittent cuff leak.        Objective:     Vital Signs (Most Recent):  Temp: 98.7 °F (37.1 °C) (04/17/23 0730)  Pulse: 83 (04/17/23 1146)  Resp: 15 (04/17/23 1146)  BP: 112/71 (04/17/23 0930)  SpO2: 100 % (04/17/23 1146) Vital Signs (24h Range):  Temp:  [97.9 °F (36.6 °C)-98.7 °F (37.1 °C)] 98.7 °F (37.1 °C)  Pulse:  [] 83  Resp:  [14-30.2] 15  SpO2:  [93 %-100 %] 100 %  BP: (103-152)/(52-77) 112/71     Weight: 66.8 kg (147 lb 4.3 oz)  Body mass index is 22.39 kg/m².      Intake/Output Summary (Last 24 hours) at 4/17/2023 1239  Last data filed at 4/17/2023 1000  Gross per 24 hour   Intake 1758.47 ml   Output 2000 ml   Net -241.53 ml         Physical Exam  Vitals reviewed.   Constitutional:       Appearance: He is ill-appearing (chronically ill). He is not toxic-appearing.   HENT:      Head: Normocephalic and atraumatic.      Nose: Nose normal. No congestion.      Mouth/Throat:      Mouth: Mucous membranes are moist.      Pharynx: Oropharynx is clear. No oropharyngeal exudate.   Eyes:      Extraocular Movements: Extraocular movements intact.      Conjunctiva/sclera: Conjunctivae normal.      Pupils: Pupils are equal, round, and reactive to light.   Neck:      Comments: Cuffed 8 trach   Cardiovascular:      Rate and Rhythm: Normal rate and regular rhythm.      Pulses: Normal pulses.      Heart sounds: No murmur heard.  Pulmonary:      Effort: Tachypnea present. No respiratory distress.      Breath sounds: No wheezing or rhonchi.      Comments:  Increased respiratory effort when PS is weaned.   Abdominal:      General: Abdomen is flat. Bowel sounds are normal.      Palpations: Abdomen is soft.   Musculoskeletal:         General: No swelling.   Skin:     General: Skin is warm and dry.      Capillary Refill: Capillary refill takes less than 2 seconds.   Neurological:      General: No focal deficit present.      Mental Status: He is oriented to person, place, and time.      Comments: Not responding to commands or voice      Review of Systems    Vents:  Vent Mode: PS/CPAP (04/17/23 1146)  Ventilator Initiated: Yes (04/11/23 0936)  Set Rate: 24 BPM (04/14/23 0821)  Vt Set: 400 mL (04/14/23 0821)  Pressure Support: 12 cmH20 (04/17/23 1146)  PEEP/CPAP: 8 cmH20 (04/17/23 1146)  Oxygen Concentration (%): 35 (04/17/23 1146)  Peak Airway Pressure: 21.1 cmH20 (04/17/23 1146)  Total Ve: 13.5 L/m (04/17/23 1146)  F/VT Ratio<105 (RSBI): (!) 43 (04/17/23 1146)    Lines/Drains/Airways       Peripherally Inserted Central Catheter Line  Duration             PICC Triple Lumen 03/20/23 2145 right basilic 27 days              Drain  Duration                  Gastrostomy/Enterostomy 01/04/22 Percutaneous endoscopic gastrostomy (PEG)  days    Male External Urinary Catheter 04/12/23 1900 4 days              Airway  Duration             Adult Surgical Airway 03/16/23 1014 Shiley Cuffed 6.0 32 days                    Significant Labs:    CBC/Anemia Profile:  Recent Labs   Lab 04/16/23 0315 04/17/23  0427   WBC 5.14 5.33   HGB 8.0* 8.9*   HCT 26.7* 27.7*    255   MCV 97 95   RDW 17.8* 17.2*          Chemistries:  Recent Labs   Lab 04/16/23 0315 04/17/23  0645   * 133*   K 4.8 4.8   CL 98 98   CO2 29 26   BUN 20 20   CREATININE 0.9 0.8   CALCIUM 9.2 9.3   ALBUMIN 1.8* 1.9*   PROT 5.8* 6.3   BILITOT 0.4 0.4   ALKPHOS 103 108   ALT 20 19   AST 34 31         All pertinent labs within the past 24 hours have been reviewed.    Significant Imaging:  I have reviewed all  pertinent imaging results/findings within the past 24 hours.      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  -intermittent cuff leak despite high cuff pressure.  Await ENT inputs.      Pulmonary  * Acute on chronic respiratory failure with hypoxia and hypercapnia  Vent Mode: PS/CPAP  Oxygen Concentration (%):  [] 35  Resp Rate Total:  [14 br/min-15 br/min] 15 br/min  PEEP/CPAP:  [8 cmH20] 8 cmH20  Pressure Support:  [12 cmH20] 12 cmH20  Mean Airway Pressure:  [10 ifE23-64.9 cmH20] 12.3 cmH20    - hypoxia and hypercapnea - weaning O2 for goal sat of 88% and above  - tolerating PS well 24/7  - trach collar trials during the day  - COPD treatment as above  - sputum culture- with pseudomonas  - improving    Hemoptysis  Likely secondary to lower respiratory tract infection in the setting of ASA/Plavix.  It seems less likely to be physical complication of the tracheostomy tube itself.    · Antibiotics for pneumonia.  · Check sputum for culture, including AFB for possible MAC infection.  · Respiratory viral panel.  · OK to resume anti-platelet medications; monitor for bleeding   · No need for additional nebulized TXA.  No bleeding since 3/19  · Appreciate ENT evaluation     COPD exacerbation  Continue duonebs but not suspecting this as the primary  at this time.  - thick secretions have been an issue, adding cpt and mucomyst nebs to assist with thinning secretions for clearance.   - follow up sputum culture with pan sensitive pseudomonas            Alexander Mcmanus MD  Pulmonology  [facility] - Intensive Care    Critical Care Time: 35  minutes  Critical secondary to respiratory failure     Critical care  was time spent personally by me on the following activities: development of treatment plan with patient or surrogate and bedside caregivers, discussions with consultants, evaluation of patient's response to treatment, examination of patient, ordering and performing treatments and interventions, ordering and review of laboratory studies, ordering and review of radiographic studies, pulse oximetry, re-evaluation of patient's condition.  This critical care time did not overlap with that of any other provider or involve time for any procedures.

## 2023-04-17 NOTE — SUBJECTIVE & OBJECTIVE
Interval History:   Grossly unchanged from prior    Review of Systems   Constitutional:  Positive for activity change and fatigue.   Cardiovascular:  Negative for chest pain.   Gastrointestinal:  Negative for abdominal pain.     Objective:     Vital Signs (Most Recent):  Temp: 98.7 °F (37.1 °C) (04/17/23 0730)  Pulse: 83 (04/17/23 1146)  Resp: 15 (04/17/23 1146)  BP: 112/71 (04/17/23 0930)  SpO2: 100 % (04/17/23 1146)   Vital Signs (24h Range):  Temp:  [97.9 °F (36.6 °C)-98.7 °F (37.1 °C)] 98.7 °F (37.1 °C)  Pulse:  [] 83  Resp:  [14-30.2] 15  SpO2:  [93 %-100 %] 100 %  BP: (103-152)/() 112/71     Weight: 66.8 kg (147 lb 4.3 oz)  Body mass index is 22.39 kg/m².    Intake/Output Summary (Last 24 hours) at 4/17/2023 1206  Last data filed at 4/17/2023 1000  Gross per 24 hour   Intake 1758.47 ml   Output 2000 ml   Net -241.53 ml        Physical Exam  Vitals reviewed.   Constitutional:       General: He is not in acute distress.     Appearance: He is well-developed and normal weight. He is ill-appearing. He is not toxic-appearing or diaphoretic.   HENT:      Head: Normocephalic and atraumatic.   Eyes:      General: No scleral icterus.     Pupils: Pupils are equal, round, and reactive to light.   Neck:      Thyroid: No thyromegaly.   Cardiovascular:      Rate and Rhythm: Normal rate and regular rhythm.      Heart sounds: No murmur heard.    No gallop.   Pulmonary:      Effort: Pulmonary effort is normal. No respiratory distress.      Breath sounds: Normal breath sounds. No stridor.   Abdominal:      General: Bowel sounds are normal. There is no distension.      Palpations: Abdomen is soft.      Tenderness: There is no abdominal tenderness.   Musculoskeletal:         General: No deformity. Normal range of motion.      Cervical back: Normal range of motion.   Skin:     General: Skin is warm.      Capillary Refill: Capillary refill takes less than 2 seconds.      Coloration: Skin is not jaundiced.      Findings:  No bruising.   Neurological:      Mental Status: He is alert and oriented to person, place, and time.      Motor: Weakness present.      Gait: Gait abnormal.   Psychiatric:         Mood and Affect: Mood normal.         Behavior: Behavior normal.           Recent Results (from the past 24 hour(s))   CBC Auto Differential    Collection Time: 04/17/23  4:27 AM   Result Value Ref Range    WBC 5.33 3.90 - 12.70 K/uL    RBC 2.92 (L) 4.60 - 6.20 M/uL    Hemoglobin 8.9 (L) 14.0 - 18.0 g/dL    Hematocrit 27.7 (L) 40.0 - 54.0 %    MCV 95 82 - 98 fL    MCH 30.5 27.0 - 31.0 pg    MCHC 32.1 32.0 - 36.0 g/dL    RDW 17.2 (H) 11.5 - 14.5 %    Platelets 255 150 - 450 K/uL    MPV 9.9 9.2 - 12.9 fL    Immature Granulocytes 1.1 (H) 0.0 - 0.5 %    Gran # (ANC) 3.8 1.8 - 7.7 K/uL    Immature Grans (Abs) 0.06 (H) 0.00 - 0.04 K/uL    Lymph # 0.5 (L) 1.0 - 4.8 K/uL    Mono # 0.7 0.3 - 1.0 K/uL    Eos # 0.2 0.0 - 0.5 K/uL    Baso # 0.02 0.00 - 0.20 K/uL    nRBC 0 0 /100 WBC    Gran % 71.3 38.0 - 73.0 %    Lymph % 9.2 (L) 18.0 - 48.0 %    Mono % 13.5 4.0 - 15.0 %    Eosinophil % 4.5 0.0 - 8.0 %    Basophil % 0.4 0.0 - 1.9 %    Differential Method Automated    Comprehensive Metabolic Panel    Collection Time: 04/17/23  6:45 AM   Result Value Ref Range    Sodium 133 (L) 136 - 145 mmol/L    Potassium 4.8 3.5 - 5.1 mmol/L    Chloride 98 95 - 110 mmol/L    CO2 26 23 - 29 mmol/L    Glucose 114 (H) 70 - 110 mg/dL    BUN 20 8 - 23 mg/dL    Creatinine 0.8 0.5 - 1.4 mg/dL    Calcium 9.3 8.7 - 10.5 mg/dL    Total Protein 6.3 6.0 - 8.4 g/dL    Albumin 1.9 (L) 3.5 - 5.2 g/dL    Total Bilirubin 0.4 0.1 - 1.0 mg/dL    Alkaline Phosphatase 108 55 - 135 U/L    AST 31 10 - 40 U/L    ALT 19 10 - 44 U/L    Anion Gap 9 8 - 16 mmol/L    eGFR >60 >60 mL/min/1.73 m^2       Microbiology Results (last 7 days)       Procedure Component Value Units Date/Time    Blood culture [[id number]] Collected: 04/11/23 1240    Order Status: Completed Specimen: Blood from  "Antecubital, Left Arm Updated: 04/15/23 1503     Blood Culture, Routine No Growth after 4 days.    Narrative:      hard stick    Blood culture [[id number]] Collected: 04/11/23 1231    Order Status: Completed Specimen: Blood Updated: 04/15/23 1503     Blood Culture, Routine No Growth after 4 days.    Culture, Respiratory with Gram Stain [[id number]]  (Abnormal)  (Susceptibility) Collected: 04/11/23 1438    Order Status: Completed Specimen: Respiratory from Tracheal Aspirate Updated: 04/14/23 1238     Respiratory Culture PSEUDOMONAS AERUGINOSA  Moderate       Gram Stain (Respiratory) Rare WBC's     Gram Stain (Respiratory) Many Gram positive rods     Gram Stain (Respiratory) Rare Gram positive cocci in pairs             Imaging Results              X-Ray Chest AP Portable (Final result)  Result time 03/18/23 12:01:13      Final result by Mariano Soto MD (03/18/23 12:01:13)                   Impression:      No detrimental change when compared with 12/29/2022.      Electronically signed by: Mariano Soto MD  Date:    03/18/2023  Time:    12:01               Narrative:    EXAMINATION:  XR CHEST AP PORTABLE    CLINICAL HISTORY:  Provided history is "chest pain;  ".    TECHNIQUE:  One view of the chest.    COMPARISON:  12/29/2022.    FINDINGS:  Tracheostomy tube is present with the tip overlying the tracheal air column between the clavicular heads and sherine.  Cardiac silhouette is borderline enlarged and similar to prior study.  Atherosclerotic calcifications overlie the aortic arch.  Probable pulmonary emphysema.  Coarsened interstitial lung markings with bibasilar subsegmental atelectasis.  Small left and trace right pleural effusions, similar to the prior study.  No pneumothorax.  Aeration is overall similar when compared with 12/29/2022. Probable percutaneous gastrostomy tube overlies the left upper quadrant of the abdomen.                                        "

## 2023-04-17 NOTE — NURSING
[facility] [place]  Nurses Note -- 4 Eyes      4/17/2023       Skin assessed on: Q Shift      [x] No Pressure Injuries Present    [x]Prevention Measures Documented    [] Yes LDA  for Pressure Injury Previously documented     [] Yes New Pressure Injury Discovered   [] LDA for New Pressure Injury Added      Attending RN:  Gemma Beaulieu RN     Second RN:  KAMILAH Hebert

## 2023-04-17 NOTE — PROGRESS NOTES
[facility] Medicine  Progress Note    Patient Name: Fareed Richard Jr.  MRN: [id number]  Patient Class: IP- Inpatient   Admission Date: 3/18/2023  Length of Stay: 29 days  Attending Physician: Kelby Sargent MD  Primary Care Provider: Kodi Tubbs MD        Subjective:     Principal Problem:Acute on chronic respiratory failure with hypoxia and hypercapnia        HPI:  Fareed Richard Jr. 73 y.o. 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  Per his wife 4 days ago he began to have pink tinged sputum via his trach.  He was seen by his ENT 2 days ago with a scope exam and a trach exchange without evidence of bleeding.  This morning at 3:00 a.m. he developed bright red blood via trach which resolved without intervention.  It is since not recurred.  He takes a daily aspirin.  He receives all medications via G-tube.  His tube feeding regimen consists of 6 cans of Isosource daily with 120 cc free water boluses.  He denies fever chest pain shortness of breath nausea vomiting abdominal pain leg swelling syncope dizziness dysuria melena hematuria hematemesis.    In the ED, hemoglobin 7.6 INR 1.0 BUN 50 creatinine 0.7  troponin negative BRCA negative O positive type and screen chest x-ray without detrimental change hypotensive to 85/54.      Overview/Hospital Course:  74 yo M w squamous CA of tongue s/p glossectomy and reconstructive flap with trach, CAD, chronic hypoxic respiratory failure (on 5L at home) and with peg presented for evaluation of hemoptysis 2 days after tracheostomy change in clinic. Transferred to ICU 3/19 when markedly hypotensive.  Unclear if due to hemorrhage, cardiogenic or septic shock.  Did require PRBC and TXA nebs but didn't seem like sufficient bleeding to cause shock.  Bleeding stopped and ENT following.  CTA shows either significant mucus  plugging or bibasilar pneumonia - pulmonology favored pneumonia.  Trach aspirate with Stenotrophomonas, Achromobacter, and Candida. Also urine culture growing Enterococcus.  He is on broad spectrum antibiotics including bactrim. Developed NSTEMI. Cardiology consulted. [facility] 3/23 which showed distal LM 30%, mild LAD 90% bifurcation lesion with D1, Cx mid 80%, RCA moderate diffuse disease with long 70% and some left to right collateral. All vessels heavily calcified. Not a candidate for CABG but plan for staged PCI. Continued on heparin drip, asa/plavix. Vasopressors weaned. [facility] 3/27 PCI to LAD and LCx. Post operatively he was hemodynamically unstable and anemic. Stat CTA showed RP hematoma near L kidney without acute bleeding. Pt transfused supportively. He improved over the next few days. Stepped down to floor. Remains on trach collar, higher than home O2 requirements. He has delirium as well and poor sleep. Attempted trazodone. The following morning, he was much more lethargic and was transferred to ICU for worsening acute on chronic hypoxic/hypercapnic respiratory failure. This improved after 1 day on the vent.     Trach aspirate with GNR; remains on cefepime in addition to bactrim for prior Stenotrophomonas. Improving at 98%, wean O2. Appears Steno and Achro being effectively treated with bactrim, now growing pseudomonas and has been on cefepime, improving. Per ICU discussion, wife now more understanding he needs LTAC placement.       Interval History:   Grossly unchanged from prior    Review of Systems   Constitutional:  Positive for activity change and fatigue.   Cardiovascular:  Negative for chest pain.   Gastrointestinal:  Negative for abdominal pain.     Objective:     Vital Signs (Most Recent):  Temp: 98.7 °F (37.1 °C) (04/17/23 0730)  Pulse: 83 (04/17/23 1146)  Resp: 15 (04/17/23 1146)  BP: 112/71 (04/17/23 0930)  SpO2: 100 % (04/17/23 1146)   Vital Signs (24h Range):  Temp:  [97.9 °F (36.6 °C)-98.7 °F (37.1  °C)] 98.7 °F (37.1 °C)  Pulse:  [] 83  Resp:  [14-30.2] 15  SpO2:  [93 %-100 %] 100 %  BP: (103-152)/() 112/71     Weight: 66.8 kg (147 lb 4.3 oz)  Body mass index is 22.39 kg/m².    Intake/Output Summary (Last 24 hours) at 4/17/2023 1206  Last data filed at 4/17/2023 1000  Gross per 24 hour   Intake 1758.47 ml   Output 2000 ml   Net -241.53 ml        Physical Exam  Vitals reviewed.   Constitutional:       General: He is not in acute distress.     Appearance: He is well-developed and normal weight. He is ill-appearing. He is not toxic-appearing or diaphoretic.   HENT:      Head: Normocephalic and atraumatic.   Eyes:      General: No scleral icterus.     Pupils: Pupils are equal, round, and reactive to light.   Neck:      Thyroid: No thyromegaly.   Cardiovascular:      Rate and Rhythm: Normal rate and regular rhythm.      Heart sounds: No murmur heard.    No gallop.   Pulmonary:      Effort: Pulmonary effort is normal. No respiratory distress.      Breath sounds: Normal breath sounds. No stridor.   Abdominal:      General: Bowel sounds are normal. There is no distension.      Palpations: Abdomen is soft.      Tenderness: There is no abdominal tenderness.   Musculoskeletal:         General: No deformity. Normal range of motion.      Cervical back: Normal range of motion.   Skin:     General: Skin is warm.      Capillary Refill: Capillary refill takes less than 2 seconds.      Coloration: Skin is not jaundiced.      Findings: No bruising.   Neurological:      Mental Status: He is alert and oriented to person, place, and time.      Motor: Weakness present.      Gait: Gait abnormal.   Psychiatric:         Mood and Affect: Mood normal.         Behavior: Behavior normal.           Recent Results (from the past 24 hour(s))   CBC Auto Differential    Collection Time: 04/17/23  4:27 AM   Result Value Ref Range    WBC 5.33 3.90 - 12.70 K/uL    RBC 2.92 (L) 4.60 - 6.20 M/uL    Hemoglobin 8.9 (L) 14.0 - 18.0 g/dL     Hematocrit 27.7 (L) 40.0 - 54.0 %    MCV 95 82 - 98 fL    MCH 30.5 27.0 - 31.0 pg    MCHC 32.1 32.0 - 36.0 g/dL    RDW 17.2 (H) 11.5 - 14.5 %    Platelets 255 150 - 450 K/uL    MPV 9.9 9.2 - 12.9 fL    Immature Granulocytes 1.1 (H) 0.0 - 0.5 %    Gran # (ANC) 3.8 1.8 - 7.7 K/uL    Immature Grans (Abs) 0.06 (H) 0.00 - 0.04 K/uL    Lymph # 0.5 (L) 1.0 - 4.8 K/uL    Mono # 0.7 0.3 - 1.0 K/uL    Eos # 0.2 0.0 - 0.5 K/uL    Baso # 0.02 0.00 - 0.20 K/uL    nRBC 0 0 /100 WBC    Gran % 71.3 38.0 - 73.0 %    Lymph % 9.2 (L) 18.0 - 48.0 %    Mono % 13.5 4.0 - 15.0 %    Eosinophil % 4.5 0.0 - 8.0 %    Basophil % 0.4 0.0 - 1.9 %    Differential Method Automated    Comprehensive Metabolic Panel    Collection Time: 04/17/23  6:45 AM   Result Value Ref Range    Sodium 133 (L) 136 - 145 mmol/L    Potassium 4.8 3.5 - 5.1 mmol/L    Chloride 98 95 - 110 mmol/L    CO2 26 23 - 29 mmol/L    Glucose 114 (H) 70 - 110 mg/dL    BUN 20 8 - 23 mg/dL    Creatinine 0.8 0.5 - 1.4 mg/dL    Calcium 9.3 8.7 - 10.5 mg/dL    Total Protein 6.3 6.0 - 8.4 g/dL    Albumin 1.9 (L) 3.5 - 5.2 g/dL    Total Bilirubin 0.4 0.1 - 1.0 mg/dL    Alkaline Phosphatase 108 55 - 135 U/L    AST 31 10 - 40 U/L    ALT 19 10 - 44 U/L    Anion Gap 9 8 - 16 mmol/L    eGFR >60 >60 mL/min/1.73 m^2       Microbiology Results (last 7 days)       Procedure Component Value Units Date/Time    Blood culture [[id number]] Collected: 04/11/23 1240    Order Status: Completed Specimen: Blood from Antecubital, Left Arm Updated: 04/15/23 1503     Blood Culture, Routine No Growth after 4 days.    Narrative:      hard stick    Blood culture [[id number]] Collected: 04/11/23 1231    Order Status: Completed Specimen: Blood Updated: 04/15/23 1503     Blood Culture, Routine No Growth after 4 days.    Culture, Respiratory with Gram Stain [[id number]]  (Abnormal)  (Susceptibility) Collected: 04/11/23 1438    Order Status: Completed Specimen: Respiratory from Tracheal Aspirate Updated: 04/14/23 1238     " Respiratory Culture PSEUDOMONAS AERUGINOSA  Moderate       Gram Stain (Respiratory) Rare WBC's     Gram Stain (Respiratory) Many Gram positive rods     Gram Stain (Respiratory) Rare Gram positive cocci in pairs             Imaging Results              X-Ray Chest AP Portable (Final result)  Result time 03/18/23 12:01:13      Final result by Mariano Soto MD (03/18/23 12:01:13)                   Impression:      No detrimental change when compared with 12/29/2022.      Electronically signed by: Mariano Soto MD  Date:    03/18/2023  Time:    12:01               Narrative:    EXAMINATION:  XR CHEST AP PORTABLE    CLINICAL HISTORY:  Provided history is "chest pain;  ".    TECHNIQUE:  One view of the chest.    COMPARISON:  12/29/2022.    FINDINGS:  Tracheostomy tube is present with the tip overlying the tracheal air column between the clavicular heads and sherine.  Cardiac silhouette is borderline enlarged and similar to prior study.  Atherosclerotic calcifications overlie the aortic arch.  Probable pulmonary emphysema.  Coarsened interstitial lung markings with bibasilar subsegmental atelectasis.  Small left and trace right pleural effusions, similar to the prior study.  No pneumothorax.  Aeration is overall similar when compared with 12/29/2022. Probable percutaneous gastrostomy tube overlies the left upper quadrant of the abdomen.                                            Assessment/Plan:      * Acute on chronic respiratory failure with hypoxia and hypercapnia  At home is on 5L O2 via trach and O2 sats typically in high 80s low 90s. Worsening respiration this admission due to aspiration of blood and likely food aspiration. Required ventilator from 3/21 to 3/23, subsequently weaned. Treated empirically for pneumonia. Respiratory cultures show Stenotrophomonas which was not treated  - started bactrim to treat Stenotrophomonas  - worsening hypoxia and hypercapnia on 4/11. This could be related to trazodone use " "(attempted 4/10 PM for sleep)   - transferred to ICU and placed on ventilator   - treating potential pneumonia. Known Stenotrophomonas- treating with bactrim. Trach aspirate with GNR- on cefepime while awaiting speciation    - does not appear that he has had another MI   - significantly improved    - Pulmonary is following       Retroperitoneal hematoma  RP hematoma discovered after LHC. See "acute blood loss anemia"      NSTEMI (non-ST elevated myocardial infarction)  See "coronary artery disease"      Anxiety  Anxiety waxes and wanes with clinical status. He also has delirium  - Stopped hydroxyzine as this could be contributing to delirium   - Prior bad reaction to ativan  - Attempted trazodone to help sleep and then had worsening encephalopathy and hypoxic/hypercapnic respiratory failure the next morning  - may try mirtazapine if this becomes an issue    Hemoptysis  -See acute blood loss anemia  -this has resolved     Pneumonia  3/22 Respiratory culture: Stenotrophomonas, Achromobacter--> on bactrim x14 days for this  4/11 Respiratory culture: pseudomonas -> on cefepime    PEG (percutaneous endoscopic gastrostomy) status  Continue medications via PEG. Does not take oral intake.   -On isosource 1.5 6 days daily with 120cc free water flushes via wife  -Will continue home tube feedings, with nutrition consulted    Tracheostomy present  See respiratory failure     ACP (advance care planning)  Advance Care Planning     Date: 04/10/2023  Palliative consulted, reviewed documentation. Full code. Needs home palliative follow up upon discharge. Time spent reviewing on 4/10/23= 5 minutes          Severe protein-calorie malnutrition  RD following. On tube feeds.      Secondary malignant neoplasm of cervical lymph node  Noted      Acute blood loss anemia  Pt presented with hemoptysis. Scopes prior to admit and by ENT during admit not showing clear source. Bleeding improved. Pt underwent LHC for NSTEMI. After that procedure he " was found to have a large retroperitoneal hematoma. Since starting DAPT he has also had recurrent hemoptysis as well as melanic stools. Trach cuff reinflated 4/2 with improvement in bleeding.  - trend CBC, transfuse prn  - Hgb now stable  - continue asa, plavix     COPD exacerbation  This seems resolved; however, did develop worsening hypoxic/hypercapnic respiratory failure  - coninue nebs      Other hyperlipidemia  -Continue home statin    Primary hypertension  Previously hypotensive, now normotensive  - continue metoprolol     Coronary artery disease involving native coronary artery of native heart with unstable angina pectoris  Troponin elevated when pt was upgraded to the ICU. At that time it was thought to be due to demand, however he had persistent chest pain concerning for ACS. Was started on heparin gtt and tolerated. Subsequently went to [facility] which showed diffuse disease. Pt not a candidate for CABG. He subsequently underwent PCI of LAD and LCx.  - Continue asa, plavix, statin    Squamous cell cancer of tongue  Dx 2021, now s/p total glossectomy, bilateral neck dissection, bilateral cervical facial flaps and anterolateral thigh flap reconstruction of glossectomy defect 1/4/22. Completed adjuvant chemoradiation. Had trach changed by ENT 2 days prior to admit and scope at that time showed no signs of bleeding despite hemoptysis present on admission.     Carotid stenosis  Continues on asa, plavix, statin       Peripheral vascular disease  Continues on asa, plavix, statin         VTE Risk Mitigation (From admission, onward)         Ordered     IP VTE LOW RISK PATIENT  Once         04/11/23 0909     Place sequential compression device  Until discontinued         03/18/23 1620     Place NIKITA hose  Until discontinued         03/18/23 1620                Discharge Planning   NISA: 4/17/2023     Code Status: Full Code   Is the patient medically ready for discharge?:     Reason for patient still in hospital (select all  that apply): Patient trending condition, Laboratory test, Treatment, Consult recommendations and Pending disposition  Discharge Plan A: Long-term acute care facility (LTAC)   Discharge Delays: None known at this time        Critical care time spent on the evaluation and treatment of severe organ dysfunction, review of pertinent labs and imaging studies, discussions with consulting providers and discussions with patient/family: 55 minutes.      Kelby Sargent MD  Department of Hospital Medicine   [facility] - Intensive Care

## 2023-04-18 PROBLEM — J90 PLEURAL EFFUSION: Status: ACTIVE | Noted: 2023-04-18

## 2023-04-18 LAB
ALBUMIN SERPL BCP-MCNC: 1.9 G/DL (ref 3.5–5.2)
ALBUMIN SERPL BCP-MCNC: 2 G/DL (ref 3.5–5.2)
ALP SERPL-CCNC: 109 U/L (ref 55–135)
ALP SERPL-CCNC: 110 U/L (ref 55–135)
ALT SERPL W/O P-5'-P-CCNC: 20 U/L (ref 10–44)
ALT SERPL W/O P-5'-P-CCNC: 24 U/L (ref 10–44)
ANION GAP SERPL CALC-SCNC: 7 MMOL/L (ref 8–16)
ANION GAP SERPL CALC-SCNC: 8 MMOL/L (ref 8–16)
APPEARANCE FLD: CLEAR
AST SERPL-CCNC: 32 U/L (ref 10–40)
AST SERPL-CCNC: 32 U/L (ref 10–40)
BASOPHILS # BLD AUTO: 0.01 K/UL (ref 0–0.2)
BASOPHILS NFR BLD: 0.2 % (ref 0–1.9)
BILIRUB SERPL-MCNC: 0.3 MG/DL (ref 0.1–1)
BILIRUB SERPL-MCNC: 0.3 MG/DL (ref 0.1–1)
BNP SERPL-MCNC: 1185 PG/ML (ref 0–99)
BODY FLD TYPE: NORMAL
BUN SERPL-MCNC: 21 MG/DL (ref 8–23)
BUN SERPL-MCNC: 22 MG/DL (ref 8–23)
CALCIUM SERPL-MCNC: 9.3 MG/DL (ref 8.7–10.5)
CALCIUM SERPL-MCNC: 9.7 MG/DL (ref 8.7–10.5)
CHLORIDE SERPL-SCNC: 99 MMOL/L (ref 95–110)
CHLORIDE SERPL-SCNC: 99 MMOL/L (ref 95–110)
CO2 SERPL-SCNC: 28 MMOL/L (ref 23–29)
CO2 SERPL-SCNC: 28 MMOL/L (ref 23–29)
COLOR FLD: YELLOW
CREAT SERPL-MCNC: 0.9 MG/DL (ref 0.5–1.4)
CREAT SERPL-MCNC: 0.9 MG/DL (ref 0.5–1.4)
DIFFERENTIAL METHOD: ABNORMAL
EOSINOPHIL # BLD AUTO: 0.3 K/UL (ref 0–0.5)
EOSINOPHIL NFR BLD: 6.6 % (ref 0–8)
ERYTHROCYTE [DISTWIDTH] IN BLOOD BY AUTOMATED COUNT: 17.3 % (ref 11.5–14.5)
EST. GFR  (NO RACE VARIABLE): >60 ML/MIN/1.73 M^2
EST. GFR  (NO RACE VARIABLE): >60 ML/MIN/1.73 M^2
GLUCOSE SERPL-MCNC: 106 MG/DL (ref 70–110)
GLUCOSE SERPL-MCNC: 124 MG/DL (ref 70–110)
HCT VFR BLD AUTO: 29.2 % (ref 40–54)
HGB BLD-MCNC: 8.6 G/DL (ref 14–18)
IMM GRANULOCYTES # BLD AUTO: 0.03 K/UL (ref 0–0.04)
IMM GRANULOCYTES NFR BLD AUTO: 0.7 % (ref 0–0.5)
LYMPHOCYTES # BLD AUTO: 0.4 K/UL (ref 1–4.8)
LYMPHOCYTES NFR BLD: 9.5 % (ref 18–48)
LYMPHOCYTES NFR FLD MANUAL: 63 %
MCH RBC QN AUTO: 28.2 PG (ref 27–31)
MCHC RBC AUTO-ENTMCNC: 29.5 G/DL (ref 32–36)
MCV RBC AUTO: 96 FL (ref 82–98)
MONOCYTES # BLD AUTO: 0.6 K/UL (ref 0.3–1)
MONOCYTES NFR BLD: 14.7 % (ref 4–15)
MONOS+MACROS NFR FLD MANUAL: 23 %
NEUTROPHILS # BLD AUTO: 2.9 K/UL (ref 1.8–7.7)
NEUTROPHILS NFR BLD: 68.3 % (ref 38–73)
NEUTROPHILS NFR FLD MANUAL: 14 %
NRBC BLD-RTO: 0 /100 WBC
PLATELET # BLD AUTO: 266 K/UL (ref 150–450)
PMV BLD AUTO: 9.9 FL (ref 9.2–12.9)
POTASSIUM SERPL-SCNC: 5 MMOL/L (ref 3.5–5.1)
POTASSIUM SERPL-SCNC: 5.4 MMOL/L (ref 3.5–5.1)
PROT SERPL-MCNC: 6.2 G/DL (ref 6–8.4)
PROT SERPL-MCNC: 6.2 G/DL (ref 6–8.4)
RBC # BLD AUTO: 3.05 M/UL (ref 4.6–6.2)
SODIUM SERPL-SCNC: 134 MMOL/L (ref 136–145)
SODIUM SERPL-SCNC: 135 MMOL/L (ref 136–145)
WBC # BLD AUTO: 4.22 K/UL (ref 3.9–12.7)
WBC # FLD: 315 /CU MM

## 2023-04-18 PROCEDURE — 88305 TISSUE EXAM BY PATHOLOGIST: ICD-10-PCS | Mod: 26,,, | Performed by: PATHOLOGY

## 2023-04-18 PROCEDURE — 99900035 HC TECH TIME PER 15 MIN (STAT)

## 2023-04-18 PROCEDURE — 94761 N-INVAS EAR/PLS OXIMETRY MLT: CPT

## 2023-04-18 PROCEDURE — 25000003 PHARM REV CODE 250: Performed by: HOSPITALIST

## 2023-04-18 PROCEDURE — 25000242 PHARM REV CODE 250 ALT 637 W/ HCPCS: Performed by: INTERNAL MEDICINE

## 2023-04-18 PROCEDURE — 82945 GLUCOSE OTHER FLUID: CPT | Performed by: GENERAL PRACTICE

## 2023-04-18 PROCEDURE — 88305 TISSUE EXAM BY PATHOLOGIST: CPT | Performed by: PATHOLOGY

## 2023-04-18 PROCEDURE — 63600175 PHARM REV CODE 636 W HCPCS: Performed by: INTERNAL MEDICINE

## 2023-04-18 PROCEDURE — 85025 COMPLETE CBC W/AUTO DIFF WBC: CPT | Performed by: HOSPITALIST

## 2023-04-18 PROCEDURE — 84311 SPECTROPHOTOMETRY: CPT | Performed by: GENERAL PRACTICE

## 2023-04-18 PROCEDURE — 94640 AIRWAY INHALATION TREATMENT: CPT

## 2023-04-18 PROCEDURE — 27000221 HC OXYGEN, UP TO 24 HOURS

## 2023-04-18 PROCEDURE — 99900026 HC AIRWAY MAINTENANCE (STAT)

## 2023-04-18 PROCEDURE — 80053 COMPREHEN METABOLIC PANEL: CPT | Performed by: HOSPITALIST

## 2023-04-18 PROCEDURE — 99291 PR CRITICAL CARE, E/M 30-74 MINUTES: ICD-10-PCS | Mod: ,,, | Performed by: INTERNAL MEDICINE

## 2023-04-18 PROCEDURE — 84157 ASSAY OF PROTEIN OTHER: CPT | Performed by: GENERAL PRACTICE

## 2023-04-18 PROCEDURE — 87075 CULTR BACTERIA EXCEPT BLOOD: CPT | Performed by: GENERAL PRACTICE

## 2023-04-18 PROCEDURE — 94668 MNPJ CHEST WALL SBSQ: CPT

## 2023-04-18 PROCEDURE — A4216 STERILE WATER/SALINE, 10 ML: HCPCS | Performed by: HOSPITALIST

## 2023-04-18 PROCEDURE — 99291 CRITICAL CARE FIRST HOUR: CPT | Mod: ,,, | Performed by: INTERNAL MEDICINE

## 2023-04-18 PROCEDURE — 36415 COLL VENOUS BLD VENIPUNCTURE: CPT | Performed by: INTERNAL MEDICINE

## 2023-04-18 PROCEDURE — 87205 SMEAR GRAM STAIN: CPT | Performed by: GENERAL PRACTICE

## 2023-04-18 PROCEDURE — 89051 BODY FLUID CELL COUNT: CPT | Performed by: GENERAL PRACTICE

## 2023-04-18 PROCEDURE — 83880 ASSAY OF NATRIURETIC PEPTIDE: CPT | Performed by: STUDENT IN AN ORGANIZED HEALTH CARE EDUCATION/TRAINING PROGRAM

## 2023-04-18 PROCEDURE — 87070 CULTURE OTHR SPECIMN AEROBIC: CPT | Performed by: GENERAL PRACTICE

## 2023-04-18 PROCEDURE — 88305 TISSUE EXAM BY PATHOLOGIST: CPT | Mod: 26,,, | Performed by: PATHOLOGY

## 2023-04-18 PROCEDURE — 25000242 PHARM REV CODE 250 ALT 637 W/ HCPCS: Performed by: HOSPITALIST

## 2023-04-18 PROCEDURE — 94003 VENT MGMT INPAT SUBQ DAY: CPT

## 2023-04-18 PROCEDURE — 82150 ASSAY OF AMYLASE: CPT | Performed by: GENERAL PRACTICE

## 2023-04-18 PROCEDURE — 36415 COLL VENOUS BLD VENIPUNCTURE: CPT | Performed by: HOSPITALIST

## 2023-04-18 PROCEDURE — 88112 CYTOPATH CELL ENHANCE TECH: CPT | Performed by: PATHOLOGY

## 2023-04-18 PROCEDURE — 25000003 PHARM REV CODE 250: Performed by: RADIOLOGY

## 2023-04-18 PROCEDURE — 88112 CYTOPATH CELL ENHANCE TECH: CPT | Mod: 26,,, | Performed by: PATHOLOGY

## 2023-04-18 PROCEDURE — 36415 COLL VENOUS BLD VENIPUNCTURE: CPT | Performed by: STUDENT IN AN ORGANIZED HEALTH CARE EDUCATION/TRAINING PROGRAM

## 2023-04-18 PROCEDURE — 25000003 PHARM REV CODE 250: Performed by: STUDENT IN AN ORGANIZED HEALTH CARE EDUCATION/TRAINING PROGRAM

## 2023-04-18 PROCEDURE — 63600175 PHARM REV CODE 636 W HCPCS: Performed by: HOSPITALIST

## 2023-04-18 PROCEDURE — 83615 LACTATE (LD) (LDH) ENZYME: CPT | Performed by: GENERAL PRACTICE

## 2023-04-18 PROCEDURE — 63600175 PHARM REV CODE 636 W HCPCS: Performed by: STUDENT IN AN ORGANIZED HEALTH CARE EDUCATION/TRAINING PROGRAM

## 2023-04-18 PROCEDURE — 88112 PR  CYTOPATH, CELL ENHANCE TECH: ICD-10-PCS | Mod: 26,,, | Performed by: PATHOLOGY

## 2023-04-18 PROCEDURE — 80053 COMPREHEN METABOLIC PANEL: CPT | Mod: 91 | Performed by: INTERNAL MEDICINE

## 2023-04-18 PROCEDURE — 20000000 HC ICU ROOM

## 2023-04-18 RX ORDER — FUROSEMIDE 10 MG/ML
40 INJECTION INTRAMUSCULAR; INTRAVENOUS ONCE
Status: COMPLETED | OUTPATIENT
Start: 2023-04-18 | End: 2023-04-18

## 2023-04-18 RX ORDER — LIDOCAINE HYDROCHLORIDE 10 MG/ML
INJECTION INFILTRATION; PERINEURAL
Status: COMPLETED | OUTPATIENT
Start: 2023-04-18 | End: 2023-04-18

## 2023-04-18 RX ADMIN — IPRATROPIUM BROMIDE AND ALBUTEROL SULFATE 3 ML: 2.5; .5 SOLUTION RESPIRATORY (INHALATION) at 07:04

## 2023-04-18 RX ADMIN — IPRATROPIUM BROMIDE AND ALBUTEROL SULFATE 3 ML: 2.5; .5 SOLUTION RESPIRATORY (INHALATION) at 04:04

## 2023-04-18 RX ADMIN — OXYCODONE HYDROCHLORIDE 5 MG: 5 TABLET ORAL at 09:04

## 2023-04-18 RX ADMIN — MELATONIN TAB 3 MG 9 MG: 3 TAB at 09:04

## 2023-04-18 RX ADMIN — LORAZEPAM 0.5 MG: 2 INJECTION INTRAMUSCULAR; INTRAVENOUS at 09:04

## 2023-04-18 RX ADMIN — CEFEPIME HYDROCHLORIDE 2 G: 2 INJECTION, SOLUTION INTRAVENOUS at 05:04

## 2023-04-18 RX ADMIN — CLOPIDOGREL BISULFATE 75 MG: 75 TABLET ORAL at 09:04

## 2023-04-18 RX ADMIN — FERROUS SULFATE TAB 325 MG (65 MG ELEMENTAL FE) 1 EACH: 325 (65 FE) TAB at 11:04

## 2023-04-18 RX ADMIN — SENNOSIDES AND DOCUSATE SODIUM 2 TABLET: 50; 8.6 TABLET ORAL at 09:04

## 2023-04-18 RX ADMIN — CHLORHEXIDINE GLUCONATE 0.12% ORAL RINSE 15 ML: 1.2 LIQUID ORAL at 09:04

## 2023-04-18 RX ADMIN — FUROSEMIDE 40 MG: 10 INJECTION, SOLUTION INTRAMUSCULAR; INTRAVENOUS at 11:04

## 2023-04-18 RX ADMIN — Medication 10 ML: at 05:04

## 2023-04-18 RX ADMIN — ATORVASTATIN CALCIUM 80 MG: 40 TABLET, FILM COATED ORAL at 09:04

## 2023-04-18 RX ADMIN — ACETYLCYSTEINE 4 ML: 100 SOLUTION ORAL; RESPIRATORY (INHALATION) at 08:04

## 2023-04-18 RX ADMIN — Medication 10 ML: at 11:04

## 2023-04-18 RX ADMIN — IPRATROPIUM BROMIDE AND ALBUTEROL SULFATE 3 ML: 2.5; .5 SOLUTION RESPIRATORY (INHALATION) at 11:04

## 2023-04-18 RX ADMIN — FAMOTIDINE 20 MG: 10 INJECTION, SOLUTION INTRAVENOUS at 09:04

## 2023-04-18 RX ADMIN — SULFAMETHOXAZOLE AND TRIMETHOPRIM 1 TABLET: 800; 160 TABLET ORAL at 09:04

## 2023-04-18 RX ADMIN — ACETYLCYSTEINE 4 ML: 100 SOLUTION ORAL; RESPIRATORY (INHALATION) at 04:04

## 2023-04-18 RX ADMIN — HYDROXYZINE HYDROCHLORIDE 25 MG: 25 TABLET ORAL at 09:04

## 2023-04-18 RX ADMIN — LIDOCAINE HYDROCHLORIDE 10 ML: 10 INJECTION, SOLUTION INFILTRATION; PERINEURAL at 03:04

## 2023-04-18 RX ADMIN — Medication 10 ML: at 12:04

## 2023-04-18 RX ADMIN — METOPROLOL TARTRATE 25 MG: 25 TABLET, FILM COATED ORAL at 09:04

## 2023-04-18 RX ADMIN — IPRATROPIUM BROMIDE AND ALBUTEROL SULFATE 3 ML: 2.5; .5 SOLUTION RESPIRATORY (INHALATION) at 08:04

## 2023-04-18 RX ADMIN — ASPIRIN 81 MG CHEWABLE TABLET 81 MG: 81 TABLET CHEWABLE at 09:04

## 2023-04-18 NOTE — PROGRESS NOTES
[facility] Medicine  Progress Note    Patient Name: Fareed Richard Jr.  MRN: [id number]  Patient Class: IP- Inpatient   Admission Date: 3/18/2023  Length of Stay: 30 days  Attending Physician: Nikita Castillo III, MD  Primary Care Provider: Kodi Tubbs MD        Subjective:     Principal Problem:Acute on chronic respiratory failure with hypoxia and hypercapnia        HPI:  Fareed Richard Jr. 73 y.o. 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  Per his wife 4 days ago he began to have pink tinged sputum via his trach.  He was seen by his ENT 2 days ago with a scope exam and a trach exchange without evidence of bleeding.  This morning at 3:00 a.m. he developed bright red blood via trach which resolved without intervention.  It is since not recurred.  He takes a daily aspirin.  He receives all medications via G-tube.  His tube feeding regimen consists of 6 cans of Isosource daily with 120 cc free water boluses.  He denies fever chest pain shortness of breath nausea vomiting abdominal pain leg swelling syncope dizziness dysuria melena hematuria hematemesis.    In the ED, hemoglobin 7.6 INR 1.0 BUN 50 creatinine 0.7  troponin negative BRCA negative O positive type and screen chest x-ray without detrimental change hypotensive to 85/54.      Overview/Hospital Course:  74 yo M w squamous CA of tongue s/p glossectomy and reconstructive flap with trach, CAD, chronic hypoxic respiratory failure (on 5L at home) and with peg presented for evaluation of hemoptysis 2 days after tracheostomy change in clinic. Transferred to ICU 3/19 when markedly hypotensive.  Unclear if due to hemorrhage, cardiogenic or septic shock.  Did require PRBC and TXA nebs but didn't seem like sufficient bleeding to cause shock.  Bleeding stopped and ENT following.  CTA shows either significant mucus  plugging or bibasilar pneumonia - pulmonology favored pneumonia.  Trach aspirate with Stenotrophomonas, Achromobacter, and Candida. Also urine culture growing Enterococcus.  He is on broad spectrum antibiotics including bactrim. Developed NSTEMI. Cardiology consulted. [facility] 3/23 which showed distal LM 30%, mild LAD 90% bifurcation lesion with D1, Cx mid 80%, RCA moderate diffuse disease with long 70% and some left to right collateral. All vessels heavily calcified. Not a candidate for CABG but plan for staged PCI. Continued on heparin drip, asa/plavix. Vasopressors weaned. [facility] 3/27 PCI to LAD and LCx. Post operatively he was hemodynamically unstable and anemic. Stat CTA showed RP hematoma near L kidney without acute bleeding. Pt transfused supportively. He improved over the next few days. Stepped down to floor. Remains on trach collar, higher than home O2 requirements. He has delirium as well and poor sleep. Attempted trazodone. The following morning, he was much more lethargic and was transferred to ICU for worsening acute on chronic hypoxic/hypercapnic respiratory failure. This improved after 1 day on the vent.     Trach aspirate with GNR; remains on cefepime in addition to bactrim for prior Stenotrophomonas. Improving at 98%, wean O2. Appears Steno and Achro being effectively treated with bactrim, now growing pseudomonas and has been on cefepime, improving. Per ICU discussion, wife now more understanding he needs LTAC placement.       Interval History: Pt states he is doing ok. Denies sob at this time    Review of Systems  Objective:     Vital Signs (Most Recent):  Temp: 98.4 °F (36.9 °C) (04/18/23 1100)  Pulse: 88 (04/18/23 1300)  Resp: (!) 22 (04/18/23 1300)  BP: 110/72 (04/18/23 1300)  SpO2: 96 % (04/18/23 1300)   Vital Signs (24h Range):  Temp:  [98.2 °F (36.8 °C)-98.7 °F (37.1 °C)] 98.4 °F (36.9 °C)  Pulse:  [] 88  Resp:  [12.9-29] 22  SpO2:  [88 %-99 %] 96 %  BP: (106-152)/(60-84) 110/72     Weight:  66.9 kg (147 lb 7.8 oz)  Body mass index is 22.43 kg/m².    Intake/Output Summary (Last 24 hours) at 4/18/2023 1344  Last data filed at 4/18/2023 1300  Gross per 24 hour   Intake 2361.13 ml   Output 2750 ml   Net -388.87 ml      Physical Exam  Vitals reviewed.   Constitutional:       General: He is not in acute distress.     Appearance: He is ill-appearing (chronic).   Pulmonary:      Effort: Pulmonary effort is normal. No respiratory distress.   Abdominal:      Palpations: Abdomen is soft.      Tenderness: There is no abdominal tenderness.   Skin:     General: Skin is warm and dry.   Neurological:      General: No focal deficit present.      Mental Status: He is alert.   Psychiatric:         Mood and Affect: Mood normal.         Behavior: Behavior normal.       Significant Labs: All pertinent labs within the past 24 hours have been reviewed.    Significant Imaging: I have reviewed all pertinent imaging results/findings within the past 24 hours.      Assessment/Plan:      * Acute on chronic respiratory failure with hypoxia and hypercapnia  At home is on 5L O2 via trach and O2 sats typically in high 80s low 90s. Worsening respiration this admission due to aspiration of blood and likely food aspiration. Required ventilator from 3/21 to 3/23, subsequently weaned. Treated empirically for pneumonia. Respiratory cultures show Stenotrophomonas which was not treated  - started bactrim to treat Stenotrophomonas  - worsening hypoxia and hypercapnia on 4/11. This could be related to trazodone use (attempted 4/10 PM for sleep)   - transferred to ICU and placed on ventilator   - treating potential pneumonia. Known Stenotrophomonas- treating with bactrim. Trach aspirate with GNR- on cefepime while awaiting speciation    - does not appear that he has had another MI   - significantly improved    - Pulmonary is following       Pleural effusion  Pulmonology consulted      Retroperitoneal hematoma  RP hematoma discovered after LHC.  "See "acute blood loss anemia"      NSTEMI (non-ST elevated myocardial infarction)  See "coronary artery disease"      Anxiety  Anxiety waxes and wanes with clinical status. He also has delirium  - Stopped hydroxyzine as this could be contributing to delirium   - Prior bad reaction to ativan  - Attempted trazodone to help sleep and then had worsening encephalopathy and hypoxic/hypercapnic respiratory failure the next morning  - may try mirtazapine if this becomes an issue    Hemoptysis  -See acute blood loss anemia  -this has resolved     Pneumonia  3/22 Respiratory culture: Stenotrophomonas, Achromobacter--> on bactrim x14 days for this  4/11 Respiratory culture: pseudomonas -> on cefepime    PEG (percutaneous endoscopic gastrostomy) status  Continue medications via PEG. Does not take oral intake.   -On isosource 1.5 6 days daily with 120cc free water flushes via wife  -Will continue home tube feedings, with nutrition consulted    Tracheostomy present  See respiratory failure     ACP (advance care planning)  Advance Care Planning     Date: 04/10/2023  Palliative consulted, reviewed documentation. Full code. Needs home palliative follow up upon discharge. Time spent reviewing on 4/10/23= 5 minutes          Severe protein-calorie malnutrition  RD following. On tube feeds.      Secondary malignant neoplasm of cervical lymph node  Noted      Acute blood loss anemia  Pt presented with hemoptysis. Scopes prior to admit and by ENT during admit not showing clear source. Bleeding improved. Pt underwent LHC for NSTEMI. After that procedure he was found to have a large retroperitoneal hematoma. Since starting DAPT he has also had recurrent hemoptysis as well as melanic stools. Trach cuff reinflated 4/2 with improvement in bleeding.  - trend CBC, transfuse prn  - Hgb now stable  - continue asa, plavix     COPD exacerbation  This seems resolved; however, did develop worsening hypoxic/hypercapnic respiratory failure  - coninue " nebs      Other hyperlipidemia  -Continue home statin    Primary hypertension  Previously hypotensive, now normotensive  - continue metoprolol     Coronary artery disease involving native coronary artery of native heart with unstable angina pectoris  Troponin elevated when pt was upgraded to the ICU. At that time it was thought to be due to demand, however he had persistent chest pain concerning for ACS. Was started on heparin gtt and tolerated. Subsequently went to [facility] which showed diffuse disease. Pt not a candidate for CABG. He subsequently underwent PCI of LAD and LCx.  - Continue asa, plavix, statin    Squamous cell cancer of tongue  Dx 2021, now s/p total glossectomy, bilateral neck dissection, bilateral cervical facial flaps and anterolateral thigh flap reconstruction of glossectomy defect 1/4/22. Completed adjuvant chemoradiation. Had trach changed by ENT 2 days prior to admit and scope at that time showed no signs of bleeding despite hemoptysis present on admission.     Carotid stenosis  Continues on asa, plavix, statin       Peripheral vascular disease  Continues on asa, plavix, statin         VTE Risk Mitigation (From admission, onward)         Ordered     IP VTE LOW RISK PATIENT  Once         04/11/23 0909     Place sequential compression device  Until discontinued         03/18/23 1620     Place NIKITA hose  Until discontinued         03/18/23 1620                Discharge Planning   NISA: 4/18/2023     Code Status: Full Code   Is the patient medically ready for discharge?:     Reason for patient still in hospital (select all that apply): Treatment and Consult recommendations  Discharge Plan A: Long-term acute care facility (LTAC)   Discharge Delays: None known at this time            Nikita Castillo III, MD  Department of Hospital Medicine   [facility] [place] - Intensive Care

## 2023-04-18 NOTE — SUBJECTIVE & OBJECTIVE
Interval History: Mr. Richard has continued to do well. Tolerating vent support well.  Intermittent cuff leak.        Objective:     Vital Signs (Most Recent):  Temp: 98.4 °F (36.9 °C) (04/18/23 0305)  Pulse: 100 (04/18/23 0940)  Resp: 20 (04/18/23 0938)  BP: (!) 143/70 (04/18/23 0940)  SpO2: (!) 94 % (04/18/23 0836) Vital Signs (24h Range):  Temp:  [98.2 °F (36.8 °C)-98.7 °F (37.1 °C)] 98.4 °F (36.9 °C)  Pulse:  [] 100  Resp:  [10.8-26.3] 20  SpO2:  [89 %-100 %] 94 %  BP: (104-143)/(57-79) 143/70     Weight: 66.8 kg (147 lb 4.3 oz)  Body mass index is 22.39 kg/m².      Intake/Output Summary (Last 24 hours) at 4/18/2023 0955  Last data filed at 4/18/2023 0530  Gross per 24 hour   Intake 1850 ml   Output 2050 ml   Net -200 ml         Physical Exam  Vitals reviewed.   Constitutional:       Appearance: He is ill-appearing (chronically ill). He is not toxic-appearing.   HENT:      Head: Normocephalic and atraumatic.      Nose: Nose normal. No congestion.      Mouth/Throat:      Mouth: Mucous membranes are moist.      Pharynx: Oropharynx is clear. No oropharyngeal exudate.   Eyes:      Extraocular Movements: Extraocular movements intact.      Conjunctiva/sclera: Conjunctivae normal.      Pupils: Pupils are equal, round, and reactive to light.   Neck:      Comments: Cuffed 8 trach   Cardiovascular:      Rate and Rhythm: Normal rate and regular rhythm.      Pulses: Normal pulses.      Heart sounds: No murmur heard.  Pulmonary:      Effort: Tachypnea present. No respiratory distress.      Breath sounds: No wheezing or rhonchi.      Comments: Increased respiratory effort when PS is weaned.   Abdominal:      General: Abdomen is flat. Bowel sounds are normal.      Palpations: Abdomen is soft.   Musculoskeletal:         General: No swelling.   Skin:     General: Skin is warm and dry.      Capillary Refill: Capillary refill takes less than 2 seconds.   Neurological:      General: No focal deficit present.      Mental Status:  He is oriented to person, place, and time.      Comments: Not responding to commands or voice      Review of Systems    Vents:  Vent Mode: PS/CPAP (04/18/23 0422)  Ventilator Initiated: Yes (04/11/23 0936)  Set Rate: 5736.1 BPM (04/18/23 0836)  Vt Set: 400 mL (04/14/23 0821)  Pressure Support: 8 cmH20 (04/18/23 0422)  PEEP/CPAP: 8 cmH20 (04/18/23 0422)  Oxygen Concentration (%): 35 (04/18/23 0836)  Peak Airway Pressure: 17.1 cmH20 (04/18/23 0422)  Total Ve: 8.8 L/m (04/18/23 0422)  F/VT Ratio<105 (RSBI): (!) 86.76 (04/18/23 0422)    Lines/Drains/Airways       Peripherally Inserted Central Catheter Line  Duration             PICC Triple Lumen 03/20/23 2145 right basilic 28 days              Drain  Duration                  Gastrostomy/Enterostomy 01/04/22 Percutaneous endoscopic gastrostomy (PEG)  days    Male External Urinary Catheter 04/12/23 1900 5 days              Airway  Duration             Adult Surgical Airway 03/16/23 1014 Shiley Cuffed 6.0 32 days                    Significant Labs:    CBC/Anemia Profile:  Recent Labs   Lab 04/17/23  0427 04/18/23 0319   WBC 5.33 4.22   HGB 8.9* 8.6*   HCT 27.7* 29.2*    266   MCV 95 96   RDW 17.2* 17.3*          Chemistries:  Recent Labs   Lab 04/17/23  0645 04/18/23  0319 04/18/23  0707   * 135* 134*   K 4.8 5.4* 5.0   CL 98 99 99   CO2 26 28 28   BUN 20 21 22   CREATININE 0.8 0.9 0.9   CALCIUM 9.3 9.7 9.3   ALBUMIN 1.9* 2.0* 1.9*   PROT 6.3 6.2 6.2   BILITOT 0.4 0.3 0.3   ALKPHOS 108 110 109   ALT 19 24 20   AST 31 32 32         All pertinent labs within the past 24 hours have been reviewed.    Significant Imaging:  I have reviewed all pertinent imaging results/findings within the past 24 hours.

## 2023-04-18 NOTE — ASSESSMENT & PLAN NOTE
Continue duonebs but not suspecting this as the primary  at this time.  - thick secretions have been an issue, adding cpt and mucomyst nebs to assist with thinning secretions for clearance.   - follow up sputum culture with pan sensitive pseudomonas.    -recommend stopping cefapime since patient already completed 7 days course.    -stenotrophomonas on bactrim since 4/10.  Recommend 10-14 days of therapy

## 2023-04-18 NOTE — ASSESSMENT & PLAN NOTE
Vent Mode: PS/CPAP  Oxygen Concentration (%):  [35] 35  Resp Rate Total:  [15 br/min-22 br/min] 20 br/min  PEEP/CPAP:  [8 cmH20] 8 cmH20  Pressure Support:  [8 [id number] cmH20] 8 cmH20  Mean Airway Pressure:  [10.9 efP65-28.8 cmH20] 12.8 cmH20    - was on trache collar prior to admission.  Recurring hypercapnic respiratory failure requiring continuous PS  - tolerating PS well 24/7  - will try trach collar trials during the day  - COPD treatment as above  - sputum culture- with pseudomonas  - cxr with chronic left effusion.  pocus 4/18/23 with 3 cm layered effusion  - ir consulted for thoracentesis since patient is on DAPT

## 2023-04-18 NOTE — PLAN OF CARE
Patient will not discharge today as planed due to medical reasons.  CM informed wife, María at [facility] and Kirsten at [facility].  New expected date of DC is 4/19/2023.

## 2023-04-18 NOTE — ASSESSMENT & PLAN NOTE
Chronic left effusion dating back as far as 12/2021.  No h/o thoracentesis.  Will plan for thoracentesis for diagnostic and therapeutic purposes

## 2023-04-18 NOTE — ASSESSMENT & PLAN NOTE
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  -no cuff leak with decrease PS  -doing well on trach collar during theday

## 2023-04-18 NOTE — NURSING
[facility] [place]  Nurses Note -- 4 Eyes      4/18/2023       Skin assessed on: Q Shift      [x] No Pressure Injuries Present    [x]Prevention Measures Documented    [] Yes LDA  for Pressure Injury Previously documented     [] Yes New Pressure Injury Discovered   [] LDA for New Pressure Injury Added      Attending RN:  Zunilda Leavitt RN     Second RN:  Gemma Beaulieu RN

## 2023-04-18 NOTE — ASSESSMENT & PLAN NOTE
Troponin elevated when pt was upgraded to the ICU. At that time it was thought to be due to demand, however he had persistent chest pain concerning for ACS. Was started on heparin gtt and tolerated. Subsequently went to [facility] which showed diffuse disease. Pt not a candidate for CABG. He subsequently underwent PCI of LAD and LCx.  - Continue asa, plavix, statin

## 2023-04-18 NOTE — SUBJECTIVE & OBJECTIVE
Interval History: Pt states he is doing ok. Denies sob at this time    Review of Systems  Objective:     Vital Signs (Most Recent):  Temp: 98.4 °F (36.9 °C) (04/18/23 1100)  Pulse: 88 (04/18/23 1300)  Resp: (!) 22 (04/18/23 1300)  BP: 110/72 (04/18/23 1300)  SpO2: 96 % (04/18/23 1300)   Vital Signs (24h Range):  Temp:  [98.2 °F (36.8 °C)-98.7 °F (37.1 °C)] 98.4 °F (36.9 °C)  Pulse:  [] 88  Resp:  [12.9-29] 22  SpO2:  [88 %-99 %] 96 %  BP: (106-152)/(60-84) 110/72     Weight: 66.9 kg (147 lb 7.8 oz)  Body mass index is 22.43 kg/m².    Intake/Output Summary (Last 24 hours) at 4/18/2023 1344  Last data filed at 4/18/2023 1300  Gross per 24 hour   Intake 2361.13 ml   Output 2750 ml   Net -388.87 ml      Physical Exam  Vitals reviewed.   Constitutional:       General: He is not in acute distress.     Appearance: He is ill-appearing (chronic).   Pulmonary:      Effort: Pulmonary effort is normal. No respiratory distress.   Abdominal:      Palpations: Abdomen is soft.      Tenderness: There is no abdominal tenderness.   Skin:     General: Skin is warm and dry.   Neurological:      General: No focal deficit present.      Mental Status: He is alert.   Psychiatric:         Mood and Affect: Mood normal.         Behavior: Behavior normal.       Significant Labs: All pertinent labs within the past 24 hours have been reviewed.    Significant Imaging: I have reviewed all pertinent imaging results/findings within the past 24 hours.

## 2023-04-18 NOTE — PLAN OF CARE
Reassessment    Patient arrived from:  home  Discharge plan at this time:  LTAC  Barriers to DC: [facility] accepted but no beds available.  Per ROMARIO at [facility] patient will receive auth for LTAC.  SHANICE spoke with Parisa at [facility] who stated they do have discharges scheduled for today.  Parisa to call back with an update.  ROMARIO at [facility] notified.  SHANICE spoke with wife who will return call.  She is concerned that he may not be ready to leave the hospital.  She will return my call within the hour.    CM will continue to follow and finalize plans        04/18/23 1044   Discharge Reassessment   Assessment Type Discharge Planning Reassessment   Did the patient's condition or plan change since previous assessment? Yes   Discharge Plan discussed with: Spouse/sig other   Why the patient remains in the hospital Requires continued medical care

## 2023-04-18 NOTE — BRIEF OP NOTE
Radiology Post-Procedure Note    Pre Op Diagnosis: AoC hypoxemic/hypercapnic respiratory failure in setting of PNA and b/l pleural effusions  Post Op Diagnosis: Same    Procedure: 1. US-guided percutaneous Lt posterolateral-approach diagnostic and therapeutic thoracentesis    Procedure performed by: Milo Cabrera MD    Written Informed Consent Obtained: Yes  Specimen Removed: YES, 700-cc of thin, straw-colored pleural fluid  Estimated Blood Loss: none    Findings:   Successful US-guided percutaneous Lt posterolateral-approach diagnostic and therapeutic thoracentesis with local anesthetic only. Patient tolerated the procedure well.     Small-to-moderate sized Lt-sided PNX noted on post-op CXR. Pt denies new Lt CP, new/worsening SOB etc and, supplemental O2 requirements and patients O2 sats have not changed. Have discussed case/findings with ordering MD/Pulmonologist (Dr Mcmanus). Given current pt stability will defer potential pleural drain placement at this time and continued monitoring in ICU overnight and reassess potential need for drain in the AM.    Thank you for considering IR for the care of your patient.     Milo Cabrera MD  Interventional Radiology

## 2023-04-18 NOTE — CONSULTS
Inpatient Radiology Pre-procedure Note    History of Present Illness:  Fareed Richard Jr. is a 73 y.o. 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    A new inpatient IR consult received for US-guided percutaneous Lt posterolateral-approach diagnostic and therapeutic thoracentesis.    Admission H&P reviewed.  Past Medical History:   Diagnosis Date    Cancer     skin to Rt forearm and face    COPD (chronic obstructive pulmonary disease)     Hyperlipidemia     Hypertension     Pseudoaneurysm      Past Surgical History:   Procedure Laterality Date    ABDOMINAL SURGERY      stents placed in liver and large intestines, per patient    ANGIOGRAPHY OF AORTIC ARCH  3/27/2023    Procedure: Aortogram, Aortic Arch;  Surgeon: Tim Bhatt MD;  Location: [facility] CATH LAB;  Service: Cardiology;;    AORTOGRAPHY WITH SERIALOGRAPHY N/A 3/27/2023    Procedure: AORTOGRAM, WITH SERIALOGRAPHY;  Surgeon: Tim Bhatt MD;  Location: [facility] CATH LAB;  Service: Cardiology;  Laterality: N/A;    CAROTID STENT      COLONOSCOPY N/A 09/27/2022    Procedure: COLONOSCOPY;  Surgeon: Donnie Peterson MD;  Location: [facility] ENDO (2ND FLR);  Service: Endoscopy;  Laterality: N/A;    COLONOSCOPY N/A 09/30/2022    Procedure: COLONOSCOPY;  Surgeon: Joy Cabrera MD;  Location: [facility] ENDO (2ND FLR);  Service: Endoscopy;  Laterality: N/A;    CORONARY STENT PLACEMENT  01/2000    DISSECTION OF NECK Bilateral 01/04/2022    Procedure: DISSECTION, NECK;  Surgeon: Naeem Smalls MD;  [facility];   Service: ENT;  Laterality: Bilateral;    ESOPHAGOGASTRODUODENOSCOPY N/A 09/27/2022    Procedure: EGD (ESOPHAGOGASTRODUODENOSCOPY);  Surgeon: Donnie Peterson MD;  Location: [facility] ENDO (2ND FLR);  Service: Endoscopy;  Laterality: N/A;    ESOPHAGOGASTRODUODENOSCOPY N/A 09/30/2022    Procedure: EGD (ESOPHAGOGASTRODUODENOSCOPY);  Surgeon: Joy Cabrera MD;  Location: [facility] ENDO (2ND FLR);  Service: Endoscopy;  Laterality: N/A;    ESOPHAGOGASTRODUODENOSCOPY W/ PEG N/A 01/04/2022    Procedure: EGD, WITH PEG TUBE INSERTION;  Surgeon: Anthony Calabrese MD;  [facility];  Service: General;  Laterality: N/A;    EYE SURGERY      Cataract bilateral    femoral stents      bilateral    FLAP PROCEDURE N/A 01/03/2022    Procedure: CREATION, FREE FLAP;  Surgeon: Naeem Smalls MD;  [facility];  Service: ENT;  Laterality: N/A;    FLAP PROCEDURE Right 01/04/2022    Procedure: CREATION, FREE FLAP;  Surgeon: Stacey Conde MD;  [facility];  Service: ENT;  Laterality: Right;  Ischemic start 1203  Ischemic stop 1353    GLOSSECTOMY N/A 01/04/2022    Procedure: GLOSSECTOMY;  Surgeon: Naeem Smalls MD;  [facility];  Service: ENT;  Laterality: N/A;    LEFT HEART CATHETERIZATION Left 3/23/2023    Procedure: Left heart cath;  Surgeon: Tacos Benitez MD;  Location: [facility] CATH LAB;  Service: Cardiology;  Laterality: Left;    PERCUTANEOUS TRANSLUMINAL BALLOON ANGIOPLASTY OF CORONARY ARTERY Left 3/27/2023    Procedure: Angioplasty-coronary;  Surgeon: Tim Bhatt MD;  Location: [facility] CATH LAB;  Service: Cardiology;  Laterality: Left;  not before 9am, R rad access, 6 Fr EBU 3.5 guide    RADICAL NECK DISSECTION Left 01/03/2022    Procedure: DISSECTION, NECK, RADICAL;  Surgeon: Naeem Smalls MD;  [facility];  Service: ENT;  Laterality: Left;    SKIN BIOPSY      SKIN CANCER EXCISION      STENT, CORONARY, MULTI VESSEL  3/27/2023    Procedure: Stent, Coronary,  Multi Vessel;  Surgeon: Tim Bhatt MD;  Location: [facility] CATH LAB;  Service: Cardiology;;    TRACHEOTOMY N/A 2022    Procedure: TRACHEOTOMY;  Surgeon: Naeem Smalls MD;  Location: [facility] OR [address] [place] [address];  Service: ENT;  Laterality: N/A;    VASCULAR SURGERY       Review of Systems:   As documented in primary team H&P    Home Meds:   Prior to Admission medications    Medication Sig Start Date End Date Taking? Authorizing Provider   amLODIPine (NORVASC) 10 MG tablet Take 10 mg by mouth. 23  Yes Historical Provider   atorvastatin (LIPITOR) 20 MG tablet 1 tablet (20 mg total) by Per G Tube route once daily. 1/20/22 3/18/23 Yes Tasha Chaudhry NP   clopidogreL (PLAVIX) 75 mg tablet Take 75 mg by mouth once daily.   Yes Historical Provider   glycopyrrolate (CUVPOSA) 1 mg/5 mL (0.2 mg/mL) Soln Take 5 mLs (1 mg total) by mouth 3 (three) times daily as needed (TO REDUCE SECRETIONS). 22  Yes Zaki Brunson Jr., MD   guaiFENesin 200 mg/5 mL Liqd Take 400 mg by mouth every 4 (four) hours as needed (for secretions). 3/14/22  Yes Christopher Jay MD   hydrOXYzine HCL (ATARAX) 25 MG tablet SMARTSI.5 Tablet(s) Gastro Tube Every 8 Hours PRN 22  Yes Historical Provider   melatonin (MELATIN) 3 mg tablet 2 tablets (6 mg total) by Per G Tube route nightly. 22  Yes Tasha Chaudhry NP   metoprolol succinate (TOPROL-XL) 200 MG 24 hr tablet Take 200 mg by mouth 2 (two) times daily. 22  Yes Historical Provider   multivit-min/FA/lycopen/lutein (CENTRUM SILVER MEN ORAL) Take by mouth.   Yes Historical Provider   omeprazole (PRILOSEC) 40 MG capsule Take 40 mg (contents of one capsule) twice daily through PEG tube. Mix contents of capsule with 10 mL applesauce. 22  Yes Brenda Saldivar MD   sodium chloride 3% 3 % nebulizer solution Take 4 mLs by nebulization 2 (two) times a day. 10/12/22  Yes Salvador Wadsworth MD   WIXELA INHUB 250-50 mcg/dose diskus inhaler SMARTSI Puff(s) By Mouth Every 12  Hours 22  Yes Historical Provider   bisacodyL (DULCOLAX) 10 mg Supp Place 1 suppository (10 mg total) rectally daily as needed. 22   Tasha Chaudhry NP   oxyCODONE (ROXICODONE) 5 mg/5 mL Soln 5 mLs (5 mg total) by Per G Tube route every 4 (four) hours as needed (Pain).  Patient not taking: Reported on 2023   Zaki Brunson Jr., MD   polyethylene glycol (GLYCOLAX) 17 gram PwPk 17 g by Per G Tube route 2 (two) times daily. 22   Tasha Chaudhry NP   sodium chloride (OCEAN) 0.65 % nasal spray 1 spray by Nasal route as needed for Congestion.  Patient not taking: Reported on 2023   Tasha Chaudhry NP   losartan (COZAAR) 50 MG tablet 1 tablet (50 mg total) by Per G Tube route once daily. 22  Tasha Chaudhry NP   metoprolol tartrate (LOPRESSOR) 100 MG tablet 1 tablet (100 mg total) by Per G Tube route 2 (two) times daily. 22  Tasha Chaudhry NP     Scheduled Meds:    acetylcysteine 100 mg/ml (10%)  4 mL Nebulization Q8H    albuterol-ipratropium  3 mL Nebulization Q4H    aspirin  81 mg Oral Daily    atorvastatin  80 mg Per G Tube Daily    ceFEPime (MAXIPIME) IVPB  2 g Intravenous Q8H    chlorhexidine  15 mL Mouth/Throat BID    clopidogreL  75 mg Oral Daily    famotidine (PF)  20 mg Intravenous BID    ferrous sulfate  1 tablet Per G Tube Daily    furosemide (LASIX) injection  40 mg Intravenous Once    melatonin  9 mg Per G Tube Nightly    metoprolol tartrate  25 mg Per G Tube BID    senna-docusate 8.6-50 mg  2 tablet Per G Tube BID    sodium chloride 0.9%  10 mL Intravenous Q6H    sulfamethoxazole-trimethoprim 800-160mg  1 tablet Per G Tube BID     Continuous Infusions:   PRN Meds:acetaminophen, albuterol-ipratropium, atropine, bisacodyL, [] fentaNYL **FOLLOWED BY** fentaNYL, guaiFENesin 100 mg/5 ml, hydrOXYzine HCL, insulin aspart U-100, loperamide, lorazepam, naloxone, nitroGLYCERIN, ondansetron, oxyCODONE, phenyleph-min oil-petrolatum,  Flushing PICC Protocol **AND** sodium chloride 0.9% **AND** sodium chloride 0.9%    Anticoagulants/Antiplatelets: aspirin and Plavix    Allergies:   Review of patient's allergies indicates:   Allergen Reactions    Ativan [lorazepam] Anxiety     Sedation Hx: have not been any systemic reactions    Labs:  No results for input(s): INR in the last 168 hours.    Invalid input(s):  PT,  PTT    Recent Labs   Lab 04/18/23  0319   WBC 4.22   HGB 8.6*   HCT 29.2*   MCV 96         Recent Labs   Lab 04/18/23  0707   *   *   K 5.0   CL 99   CO2 28   BUN 22   CREATININE 0.9   CALCIUM 9.3   ALT 20   AST 32   ALBUMIN 1.9*   BILITOT 0.3     Vitals:  Temp: 98.4 °F (36.9 °C) (04/18/23 0305)  Pulse: 100 (04/18/23 0940)  Resp: 20 (04/18/23 0938)  BP: (!) 143/70 (04/18/23 0940)  SpO2: (!) 94 % (04/18/23 0836)     Physical Exam:  General: no acute distress  Mental Status: alert and oriented to person, place and time  HEENT: normocephalic, atraumatic  Chest: +labored breathing  Heart: regular heart rate  Abdomen: nondistended  Extremity: moves all extremities    A/P:  73 y.o. male with AoC hypoxemic/hypercapnic respiratory failure, tracheostomy aspirate GNR and small Rt-sided and moderate Lt-sided pleural effusions which are likely contributing to the patients presenting symptoms requiring image-guided decompression and fluid sampling for source control, symptom relief, diagnosis and treatment planning.    AoC hypoxemic/hypercapnic respiratory failure in setting of PNA and b/l pleural effusions - Will attempt US-guided percutaneous Lt posterolateral-approach diagnostic and therapeutic thoracentesis with local anesthetic only.    Please place all pertinent fluid studies in epic prior to the procedure to ensure that the studies the ordering provider/team deem appropriate are performed.    Risks (including, but not limited to, pain, bleeding, infection, damage to nearby structures, failure to obtain sufficient material for  a diagnosis, the need for additional procedures, and death), benefits, and alternatives were discussed with the patient. All questions were answered to the best of my abilities. The patient wishes to proceed with the procedure. Written informed consent was obtained.    Thank you for considering IR for the care of your patient.     Milo Cabrera MD  Interventional Radiology

## 2023-04-18 NOTE — PHYSICIAN QUERY
PT Name: Fareed Richard Jr.  MR #: [id number]    DOCUMENTATION CLARIFICATION     CDS/: Autumn Cha RN, BSN              Contact information: [email or web address]   This form is a permanent document in the medical record.     Query Date: April 18, 2023    By submitting this query, we are merely seeking further clarification of documentation. Please utilize your independent clinical judgment when addressing the question(s) below.    The Medical Record contains the following:   Indicators   Supporting Clinical Findings Location in Medical Record   x AMS, Confusion,  LOC, etc.  had worsening encephalopathy and hypoxic/hypercapnic respiratory failure the next morning    restless & jumpy, alert but drowsy, respiratory in room doing trach care, MD in room ABG's ordered & resulted, worsening respiratory status noted    decompensation of resp and mental status    Mental status change   Confusion  disoriented  Lethargic, does not follow commands this morning. More labored breathing  - worsening hypoxia and hypercapnia on 4/11. This could be related to trazodone use (attempted 4/10 PM for sleep)    Anxiety  Anxiety waxes and wanes with clinical status  He also has delirium  Stopped hydroxyzine as this could be contributing to delirium     awake and alert today, following all commands    awake and alert  He has delirium as well and poor sleep. Attempted trazodone. The following morning, he was much more lethargic and was transferred to ICU for worsening acute on chronic hypoxic/hypercapnic respiratory failure. This improved after 1 day on the vent.    HM, PN, 4/12    RN Note, 4/11      RN, Rapid response note, 4/11    HM, PN, 4/11                    CCM, PN, 4/12      HM, PN, 4/13   x Acute/Chronic Illness history of squamous cell cancer of tongue S/P trache no longer on chemotherapy, CAD, anemia presents to the hospital with a chief complaint of hemoptysis  CAD, chronic hypoxic respiratory failure (on 5L at home) and with  peg presented for evaluation of hemoptysis 2 days after tracheostomy change in clinic  Acute on chronic respiratory failure with hypoxia and hypercapnia    - worsening hypoxia and hypercapnia on 4/11. This could be related to trazodone use (attempted 4/10 PM for sleep)  Anxiety  Anxiety waxes and wanes with clinical status  He also has delirium  Stopped hydroxyzine as this could be contributing to delirium     - Attempted trazodone to help sleep and then had worsening encephalopathy and hypoxic/hypercapnic respiratory failure the next morning    Pneumonia  3/22 Respiratory culture: Stenotrophomonas, Achromobacter--> on bactrim x14 days for this  4/11 Respiratory culture: GNR--> on cefepime while awaiting speciation  HM, PN, 4/11                        HM, PN, 4/12      HM, PN, 4/13    Radiology Findings     x Electrolyte Imbalance  04/12/23 03:18 04/13/23 02:59 04/14/23 03:46 04/15/23 02:25 04/16/23 03:15 04/17/23 06:45 04/18/23 03:19 04/18/23 07:07   Sodium 136 136 137 132 (L) 134 (L) 133 (L) 135 (L) 134 (L)        LAB   x Medication Stopped hydroxyzine as this could be contributing to delirium  HM, PN, 4/11       x Treatment         Stopped hydroxyzine as this could be contributing to delirium     more confused and lethargic on the floor and was found to be acidotic and hypercapnic. He was transferred back to the ICU and placed on the ventilator.     -follow up VBG for improvement  - hypoxia and hypercapnea - weaning O2 for goal sat of 88% and above  - COPD treatment as above  - sputum culture- no clear signs of infection on CXR  - consider diuresis but now hypotensive so more concerning for sepsis vs possible cardiac event.   - can also consider PE- has been on DAPT and no signs of significant bleeding at this time can consider adding back dvt ppx    - Prior bad reaction to ativan  - Attempted trazodone to help sleep and then had worsening encephalopathy and hypoxic/hypercapnic respiratory failure the next  morning  - may try mirtazapine if this becomes an issue    Trach aspirate with GNR; remains on cefepime in addition to bactrim for prior Stenotrophomonas.    HM, PN, 4/11    CCM, PN, 4/11                      HM, PN, 4/12          HM, PN, 4/13    Other       The noted clinical guidelines are only system guidelines and do not replace the providers clinical judgment.    The National [place] of Neurologic Disorders and Stroke (NINDS) of the NIH describes encephalopathy as any diffuse disease of the brain that alters brain function or structure.    Provider, please further specify the Encephalopathy diagnosis or diagnoses associated with above clinical findings.  [x   ] Metabolic Encephalopathy - Due to electrolyte imbalance, metabolic derangements, or infectious processes, includes Septic Encephalopathy, Uremic Encephalopathy     [   ] Toxic Encephalopathy - Due to drugs, chemicals, or other toxic substances     [   ] Other Encephalopathy (please specify): ____________________     [   ] Other neurological condition- Includes Post-ictal altered mental status (please specify condition): __________     [   ]  Clinically Undetermined       Please document in your progress notes daily for the duration of treatment until resolved, and include in your discharge summary.    References:  SANTOSH Dan RN, CCDS. (2018, June 9). Notes from the Instructor: Encephalopathy tips. Retrieved October 22, 2020, from [email or web address]    ICD-9-CM Coding Clinic First Quarter 2013, Effective with discharges: October 21, 2013 [facility] § Seizure with encephalopathy due to postictal state (2013).    ICD-10-CM/PCS [employer] Integrated Codebook (Version V.20.8.10.0) [Computer software]. (2020). Retrieved October 21, 2020.    National [place] of Neurological Disorders and Stroke. (2019, March 27). Retrieved October 22, 2020, from  https://www.ninds.nih.gov/Disorders/All-Disorders/Dgxwguqaifmaem-Eanldvtetlr-Bfzt    Form No. 89559

## 2023-04-18 NOTE — PLAN OF CARE
Problem: Adult Inpatient Plan of Care  Goal: Plan of Care Review  4/18/2023 1744 by Zunilda Leavitt RN  Outcome: Ongoing, Progressing  4/18/2023 1743 by Zunilda Leavitt RN  Outcome: Ongoing, Progressing  Goal: Patient-Specific Goal (Individualized)  4/18/2023 1744 by Zunilda Leavitt RN  Outcome: Ongoing, Progressing  4/18/2023 1743 by Zunilda Leavitt RN  Outcome: Ongoing, Progressing  Goal: Absence of Hospital-Acquired Illness or Injury  4/18/2023 1744 by Zunilda Leavitt RN  Outcome: Ongoing, Progressing  4/18/2023 1743 by Zunilda Leavitt RN  Outcome: Ongoing, Progressing  Goal: Optimal Comfort and Wellbeing  4/18/2023 1744 by Zunilda Leavitt RN  Outcome: Ongoing, Progressing  4/18/2023 1743 by Zunilda Leavitt RN  Outcome: Ongoing, Progressing  Goal: Readiness for Transition of Care  4/18/2023 1744 by Zunilda Leavitt RN  Outcome: Ongoing, Progressing  4/18/2023 1743 by Zunilda Leavitt RN  Outcome: Ongoing, Progressing     Problem: Fall Injury Risk  Goal: Absence of Fall and Fall-Related Injury  4/18/2023 1744 by Zunilda Leavitt RN  Outcome: Ongoing, Progressing  4/18/2023 1743 by Zunilda Leavitt RN  Outcome: Ongoing, Progressing     Problem: Skin Injury Risk Increased  Goal: Skin Health and Integrity  4/18/2023 1744 by Zunilda Leavitt RN  Outcome: Ongoing, Progressing  4/18/2023 1743 by Zunilda Leavitt RN  Outcome: Ongoing, Progressing     Problem: Infection  Goal: Absence of Infection Signs and Symptoms  4/18/2023 1744 by Zunilda Leavitt RN  Outcome: Ongoing, Progressing  4/18/2023 1743 by Zunilda Leavitt RN  Outcome: Ongoing, Progressing     Problem: Coping Ineffective  Goal: Effective Coping  4/18/2023 1744 by Zunilda Leavitt RN  Outcome: Ongoing, Progressing  4/18/2023 1743 by Zunilda Leavitt RN  Outcome: Ongoing, Progressing     Problem: Impaired Wound Healing  Goal: Optimal Wound Healing  Outcome: Ongoing, Progressing     Problem: Communication Impairment (Artificial  Airway)  Goal: Effective Communication  Outcome: Ongoing, Progressing     Problem: Device-Related Complication Risk (Artificial Airway)  Goal: Optimal Device Function  Outcome: Ongoing, Progressing     Problem: Skin and Tissue Injury (Artificial Airway)  Goal: Absence of Device-Related Skin or Tissue Injury  Outcome: Ongoing, Progressing     Problem: Aspiration (Enteral Nutrition)  Goal: Absence of Aspiration Signs and Symptoms  Outcome: Ongoing, Progressing     Problem: Device-Related Complication Risk (Enteral Nutrition)  Goal: Safe, Effective Therapy Delivery  Outcome: Ongoing, Progressing     Problem: Feeding Intolerance (Enteral Nutrition)  Goal: Feeding Tolerance  Outcome: Ongoing, Progressing     Problem: Mobility Impairment  Goal: Optimal Mobility  Outcome: Ongoing, Progressing     Problem: Fluid Imbalance (Pneumonia)  Goal: Fluid Balance  Outcome: Ongoing, Progressing     Problem: Infection (Pneumonia)  Goal: Resolution of Infection Signs and Symptoms  Outcome: Ongoing, Progressing     Problem: Respiratory Compromise (Pneumonia)  Goal: Effective Oxygenation and Ventilation  Outcome: Ongoing, Progressing

## 2023-04-18 NOTE — ASSESSMENT & PLAN NOTE
Likely secondary to lower respiratory tract infection in the setting of ASA/Plavix.  It seems less likely to be physical complication of the tracheostomy tube itself.    · Antibiotics for pneumonia.  · Check sputum for culture, including AFB for possible MAC infection.  · Respiratory viral panel.  · OK to resume anti-platelet medications; monitor for bleeding   · No need for additional nebulized TXA.  No bleeding since 3/19

## 2023-04-18 NOTE — PROGRESS NOTES
"[facility] Intensive Care  Critical Care Medicine  Progress Note    Patient Name: Fareed Richard Jr.  MRN: [id number]  Admission Date: 3/18/2023  Hospital Length of Stay: 30 days  Code Status: Full Code  Attending Provider: Nikita Castillo III, MD  Primary Care Provider: Kodi Tubbs MD   Principal Problem: Acute on chronic respiratory failure with hypoxia and hypercapnia    Subjective:     HPI:  Asked to evaluate for "hypotension and bleeding from trach site (replaced a few days ago)".    He was seen in ENT clinic by Dr. Smalls on 3/16 for routinely scheduled follow up and trach change.  He was having some thick yellow secretions with perhaps a hint of pink from the trach at time even before the trach exchange.  Subsequently, he was noted to have increase in congestion with more bloody-looking sputum.  He denies fever/chills/night sweats but has been more congested with dyspnea noted.  The day of presentation to the ED (3/18), secretions were frankly bloody => so he came to the ED.  He denies prior similar episodes.  He is on chronic aspirin and Plavix due to severe vascular disease with past stenting.    Review of past chest CT from October 2022 shows severe peripheral bullous changes, as well as centrilobular emphysema changes.  There is a modest left pleural effusion and left upper lobe nodular opacity.  There is bronchiectasis and diffuse micronodular changes in a tree-in-bud configuration to suggest chronic small airway inflammation/infection.  Chest CT at this time shows similar findings but now shows increased bibasilar air-space disease with consolidation/air bronchograms consistent with pneumonia.  Procalcitonin is normal.  Troponin has bumped up since admission to suggest possible demand ischemia in the setting of worsening anemia and hypotension.        Patient previously seen in Pulmonary Clinic by Dr. Wadsworth in October 2022.  From Dr. Wadsworth's note:    Fareed Richard Jr. is a 72 y.o. male who presents " for evaluation of chronic hypoxemic respiratory failure. He coughs daily, particularly in the evening. The cough is productive of yellow mucus which he expectorates without difficult via his trach. He did not experience chronic cough prior to his trach. He experiences chronic dyspnea on exertion. He takes wixela inhaler 1 puff BID (still inhales through his mouth) & an albuterol nebulizer. He has been taking prednisone for the past six weeks (for copd?). He takes glycopyrrolate due to constant oral secretions.      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 S/p tracheostomy, neck dissection, & total glossectomy January 2022.     HeSince his last visit he was hospitalized for acute respiratory failure requiring intubation and had an extended hospital course; admitted 4/19/22 and discharged 5/12/22.  3 month post-treatment PET CT reviewed; no evidence of local residual or recurrent disease.  Pulmonary findings likely reflect sequelae of recent hospitalization.  Concern for active inflammation going on; agree with starting steroids and getting PFTs.  Will need repeat CT chest in 8 weeks and follow up with pulmonology.     PMH: CAD status post RCA PCI in 2000, carotid stenosis status post carotid enterectomy in 2018, PAD status post bilateral femoral atherectomy and hypertension.     He is a former 2PPD smoker; he quit six years ago.        Hospital/ICU Course:  No notes on file    Interval History: Mr. Richard has continued to do well. Tolerating vent support well.  Intermittent cuff leak.        Objective:     Vital Signs (Most Recent):  Temp: 98.4 °F (36.9 °C) (04/18/23 0305)  Pulse: 100 (04/18/23 0940)  Resp: 20 (04/18/23 0938)  BP: (!) 143/70 (04/18/23 0940)  SpO2: (!) 94 % (04/18/23 0836) Vital Signs (24h Range):  Temp:  [98.2 °F (36.8 °C)-98.7 °F (37.1 °C)] 98.4 °F (36.9 °C)  Pulse:  [] 100  Resp:  [10.8-26.3] 20  SpO2:  [89 %-100 %] 94 %  BP: (104-143)/(57-79) 143/70      Weight: 66.8 kg (147 lb 4.3 oz)  Body mass index is 22.39 kg/m².      Intake/Output Summary (Last 24 hours) at 4/18/2023 0924  Last data filed at 4/18/2023 0530  Gross per 24 hour   Intake 1850 ml   Output 2050 ml   Net -200 ml         Physical Exam  Vitals reviewed.   Constitutional:       Appearance: He is ill-appearing (chronically ill). He is not toxic-appearing.   HENT:      Head: Normocephalic and atraumatic.      Nose: Nose normal. No congestion.      Mouth/Throat:      Mouth: Mucous membranes are moist.      Pharynx: Oropharynx is clear. No oropharyngeal exudate.   Eyes:      Extraocular Movements: Extraocular movements intact.      Conjunctiva/sclera: Conjunctivae normal.      Pupils: Pupils are equal, round, and reactive to light.   Neck:      Comments: Cuffed 8 trach   Cardiovascular:      Rate and Rhythm: Normal rate and regular rhythm.      Pulses: Normal pulses.      Heart sounds: No murmur heard.  Pulmonary:      Effort: Tachypnea present. No respiratory distress.      Breath sounds: No wheezing or rhonchi.      Comments: Increased respiratory effort when PS is weaned.   Abdominal:      General: Abdomen is flat. Bowel sounds are normal.      Palpations: Abdomen is soft.   Musculoskeletal:         General: No swelling.   Skin:     General: Skin is warm and dry.      Capillary Refill: Capillary refill takes less than 2 seconds.   Neurological:      General: No focal deficit present.      Mental Status: He is oriented to person, place, and time.      Comments: Not responding to commands or voice      Review of Systems    Vents:  Vent Mode: PS/CPAP (04/18/23 0422)  Ventilator Initiated: Yes (04/11/23 0936)  Set Rate: 5736.1 BPM (04/18/23 0836)  Vt Set: 400 mL (04/14/23 0821)  Pressure Support: 8 cmH20 (04/18/23 0422)  PEEP/CPAP: 8 cmH20 (04/18/23 0422)  Oxygen Concentration (%): 35 (04/18/23 0836)  Peak Airway Pressure: 17.1 cmH20 (04/18/23 0422)  Total Ve: 8.8 L/m (04/18/23 0422)  F/VT Ratio<105  (RSBI): (!) 86.76 (04/18/23 0422)    Lines/Drains/Airways       Peripherally Inserted Central Catheter Line  Duration             PICC Triple Lumen 03/20/23 2145 right basilic 28 days              Drain  Duration                  Gastrostomy/Enterostomy 01/04/22 Percutaneous endoscopic gastrostomy (PEG)  days    Male External Urinary Catheter 04/12/23 1900 5 days              Airway  Duration             Adult Surgical Airway 03/16/23 1014 Shiley Cuffed 6.0 32 days                    Significant Labs:    CBC/Anemia Profile:  Recent Labs   Lab 04/17/23  0427 04/18/23  0319   WBC 5.33 4.22   HGB 8.9* 8.6*   HCT 27.7* 29.2*    266   MCV 95 96   RDW 17.2* 17.3*          Chemistries:  Recent Labs   Lab 04/17/23  0645 04/18/23 0319 04/18/23  0707   * 135* 134*   K 4.8 5.4* 5.0   CL 98 99 99   CO2 26 28 28   BUN 20 21 22   CREATININE 0.8 0.9 0.9   CALCIUM 9.3 9.7 9.3   ALBUMIN 1.9* 2.0* 1.9*   PROT 6.3 6.2 6.2   BILITOT 0.4 0.3 0.3   ALKPHOS 108 110 109   ALT 19 24 20   AST 31 32 32         All pertinent labs within the past 24 hours have been reviewed.    Significant Imaging:  I have reviewed all pertinent imaging results/findings within the past 24 hours.      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  -no cuff leak with decrease PS  -doing well on trach collar during theday    Pulmonary  * Acute on chronic respiratory failure with hypoxia and hypercapnia  Vent Mode: PS/CPAP  Oxygen Concentration (%):  [35] 35  Resp Rate Total:  [15 br/min-22 br/min] 20 br/min  PEEP/CPAP:  [8 cmH20] 8 cmH20  Pressure Support:  [8 yfV47-64 cmH20] 8 cmH20  Mean Airway Pressure:  [10.9 inD97-51.8 cmH20] 12.8  cmH20    - was on trache collar prior to admission.  Recurring hypercapnic respiratory failure requiring continuous PS  - tolerating PS well 24/7  - will try trach collar trials during the day  - COPD treatment as above  - sputum culture- with pseudomonas  - cxr with chronic left effusion.  pocus 4/18/23 with 3 cm layered effusion  - ir consulted for thoracentesis since patient is on DAPT    Pleural effusion  Chronic left effusion dating back as far as 12/2021.  No h/o thoracentesis.  Will plan for thoracentesis for diagnostic and therapeutic purposes    Hemoptysis  Likely secondary to lower respiratory tract infection in the setting of ASA/Plavix.  It seems less likely to be physical complication of the tracheostomy tube itself.    · Antibiotics for pneumonia.  · Check sputum for culture, including AFB for possible MAC infection.  · Respiratory viral panel.  · OK to resume anti-platelet medications; monitor for bleeding   · No need for additional nebulized TXA.  No bleeding since 3/19      COPD exacerbation  Continue duonebs but not suspecting this as the primary  at this time.  - thick secretions have been an issue, adding cpt and mucomyst nebs to assist with thinning secretions for clearance.   - follow up sputum culture with pan sensitive pseudomonas.    -recommend stopping cefapime since patient already completed 7 days course.    -stenotrophomonas on bactrim since 4/10.  Recommend 10-14 days of therapy    Cardiac/Vascular  NSTEMI (non-ST elevated myocardial infarction)  -S/p stent  -dapt       Critical Care Daily Checklist:    A: Awake: RASS Goal/Actual Goal:    Actual: Johnson Agitation Sedation Scale (RASS): Restless   B: Spontaneous Breathing Trial Performed? Spon. Breathing Trial Initiated?: Initiated (04/13/23 0940)   C: SAT & SBT Coordinated?  no                      D: Delirium: CAM-ICU Overall CAM-ICU: Negative   E: Early Mobility Performed? Yes   F: Feeding Goal: Goals: 1. Diet advancement by  RD follow up.  Status: Nutrition Goal Status: goal not met   Current Diet Order   Procedures    Diet NPO Except for: Medication, Sips with Medication     Order Specific Question:   Except for     Answer:   Medication     Order Specific Question:   Except for     Answer:   Sips with Medication      AS: Analgesia/Sedation none   T: Thromboembolic Prophylaxis dapt   H: HOB > 300 Yes   U: Stress Ulcer Prophylaxis (if needed) pepcid   G: Glucose Control iss   B: Bowel Function Stool Occurrence: 1   I: Indwelling Catheter (Lines & Lainez) Necessity none   D: De-escalation of Antimicrobials/Pharmacotherapies cefapime    Plan for the day/ETD Consult IR for thora    Code Status:  Family/Goals of Care: Full Code       Critical Care Time: 35 minutes  Critical secondary to Patient has a condition that poses threat to life and bodily function: respiratory failure      Critical care was time spent personally by me on the following activities: development of treatment plan with patient or surrogate and bedside caregivers, discussions with consultants, evaluation of patient's response to treatment, examination of patient, ordering and performing treatments and interventions, ordering and review of laboratory studies, ordering and review of radiographic studies, pulse oximetry, re-evaluation of patient's condition. This critical care time did not overlap with that of any other provider or involve time for any procedures.     Alexander Mcmanus MD  Critical Care Medicine  [facility] [place] [facility]

## 2023-04-18 NOTE — NURSING
[facility] [place]  Nurses Note -- 4 Eyes      4/18/2023       Skin assessed on: Q Shift      [x] No Pressure Injuries Present    []Prevention Measures Documented    [] Yes LDA  for Pressure Injury Previously documented     [] Yes New Pressure Injury Discovered   [] LDA for New Pressure Injury Added      Attending RN:  Gemma Beaulieu RN     Second RN:  Zunilda FROST RN

## 2023-04-19 PROBLEM — J95.811 POSTPROCEDURAL PNEUMOTHORAX: Status: ACTIVE | Noted: 2023-04-19

## 2023-04-19 LAB
ALBUMIN SERPL BCP-MCNC: 2 G/DL (ref 3.5–5.2)
ALP SERPL-CCNC: 114 U/L (ref 55–135)
ALT SERPL W/O P-5'-P-CCNC: 22 U/L (ref 10–44)
AMYLASE, BODY FLUID: 16 U/L
ANION GAP SERPL CALC-SCNC: 5 MMOL/L (ref 8–16)
AST SERPL-CCNC: 30 U/L (ref 10–40)
BASOPHILS # BLD AUTO: 0.02 K/UL (ref 0–0.2)
BASOPHILS NFR BLD: 0.4 % (ref 0–1.9)
BILIRUB SERPL-MCNC: 0.2 MG/DL (ref 0.1–1)
BODY FLUID SOURCE AMYLASE: NORMAL
BODY FLUID SOURCE, LDH: NORMAL
BUN SERPL-MCNC: 23 MG/DL (ref 8–23)
CALCIUM SERPL-MCNC: 9.4 MG/DL (ref 8.7–10.5)
CHLORIDE SERPL-SCNC: 97 MMOL/L (ref 95–110)
CO2 SERPL-SCNC: 32 MMOL/L (ref 23–29)
CREAT SERPL-MCNC: 1 MG/DL (ref 0.5–1.4)
DIFFERENTIAL METHOD: ABNORMAL
EOSINOPHIL # BLD AUTO: 0.2 K/UL (ref 0–0.5)
EOSINOPHIL NFR BLD: 4.6 % (ref 0–8)
ERYTHROCYTE [DISTWIDTH] IN BLOOD BY AUTOMATED COUNT: 17 % (ref 11.5–14.5)
EST. GFR  (NO RACE VARIABLE): >60 ML/MIN/1.73 M^2
GLUCOSE FLD-MCNC: 120 MG/DL
GLUCOSE SERPL-MCNC: 136 MG/DL (ref 70–110)
HCT VFR BLD AUTO: 29.6 % (ref 40–54)
HGB BLD-MCNC: 9.3 G/DL (ref 14–18)
IMM GRANULOCYTES # BLD AUTO: 0.03 K/UL (ref 0–0.04)
IMM GRANULOCYTES NFR BLD AUTO: 0.6 % (ref 0–0.5)
LDH FLD L TO P-CCNC: 169 U/L
LYMPHOCYTES # BLD AUTO: 0.4 K/UL (ref 1–4.8)
LYMPHOCYTES NFR BLD: 8.8 % (ref 18–48)
MCH RBC QN AUTO: 29.5 PG (ref 27–31)
MCHC RBC AUTO-ENTMCNC: 31.4 G/DL (ref 32–36)
MCV RBC AUTO: 94 FL (ref 82–98)
MONOCYTES # BLD AUTO: 0.7 K/UL (ref 0.3–1)
MONOCYTES NFR BLD: 14.6 % (ref 4–15)
NEUTROPHILS # BLD AUTO: 3.5 K/UL (ref 1.8–7.7)
NEUTROPHILS NFR BLD: 71 % (ref 38–73)
NRBC BLD-RTO: 0 /100 WBC
PLATELET # BLD AUTO: 272 K/UL (ref 150–450)
PMV BLD AUTO: 9.8 FL (ref 9.2–12.9)
POTASSIUM SERPL-SCNC: 4.8 MMOL/L (ref 3.5–5.1)
PROT FLD-MCNC: 3 G/DL
PROT SERPL-MCNC: 6.1 G/DL (ref 6–8.4)
RBC # BLD AUTO: 3.15 M/UL (ref 4.6–6.2)
SODIUM SERPL-SCNC: 134 MMOL/L (ref 136–145)
SPECIMEN SOURCE: NORMAL
SPECIMEN SOURCE: NORMAL
WBC # BLD AUTO: 4.99 K/UL (ref 3.9–12.7)

## 2023-04-19 PROCEDURE — 25000003 PHARM REV CODE 250: Performed by: HOSPITALIST

## 2023-04-19 PROCEDURE — 80053 COMPREHEN METABOLIC PANEL: CPT | Performed by: HOSPITALIST

## 2023-04-19 PROCEDURE — 94761 N-INVAS EAR/PLS OXIMETRY MLT: CPT

## 2023-04-19 PROCEDURE — 20000000 HC ICU ROOM

## 2023-04-19 PROCEDURE — 94668 MNPJ CHEST WALL SBSQ: CPT

## 2023-04-19 PROCEDURE — 36415 COLL VENOUS BLD VENIPUNCTURE: CPT | Performed by: HOSPITALIST

## 2023-04-19 PROCEDURE — 99900035 HC TECH TIME PER 15 MIN (STAT)

## 2023-04-19 PROCEDURE — 25000242 PHARM REV CODE 250 ALT 637 W/ HCPCS: Performed by: HOSPITALIST

## 2023-04-19 PROCEDURE — 85025 COMPLETE CBC W/AUTO DIFF WBC: CPT | Performed by: HOSPITALIST

## 2023-04-19 PROCEDURE — 25000003 PHARM REV CODE 250: Performed by: STUDENT IN AN ORGANIZED HEALTH CARE EDUCATION/TRAINING PROGRAM

## 2023-04-19 PROCEDURE — 99291 PR CRITICAL CARE, E/M 30-74 MINUTES: ICD-10-PCS | Mod: ,,, | Performed by: INTERNAL MEDICINE

## 2023-04-19 PROCEDURE — A4216 STERILE WATER/SALINE, 10 ML: HCPCS | Performed by: HOSPITALIST

## 2023-04-19 PROCEDURE — 94640 AIRWAY INHALATION TREATMENT: CPT

## 2023-04-19 PROCEDURE — 99291 CRITICAL CARE FIRST HOUR: CPT | Mod: ,,, | Performed by: INTERNAL MEDICINE

## 2023-04-19 PROCEDURE — 25000003 PHARM REV CODE 250: Performed by: INTERNAL MEDICINE

## 2023-04-19 PROCEDURE — 27000221 HC OXYGEN, UP TO 24 HOURS

## 2023-04-19 PROCEDURE — 25000242 PHARM REV CODE 250 ALT 637 W/ HCPCS: Performed by: INTERNAL MEDICINE

## 2023-04-19 PROCEDURE — 99900026 HC AIRWAY MAINTENANCE (STAT)

## 2023-04-19 RX ORDER — FUROSEMIDE 40 MG/1
40 TABLET ORAL DAILY
Status: DISCONTINUED | OUTPATIENT
Start: 2023-04-19 | End: 2023-04-20

## 2023-04-19 RX ADMIN — FERROUS SULFATE TAB 325 MG (65 MG ELEMENTAL FE) 1 EACH: 325 (65 FE) TAB at 08:04

## 2023-04-19 RX ADMIN — ACETYLCYSTEINE 4 ML: 100 SOLUTION ORAL; RESPIRATORY (INHALATION) at 08:04

## 2023-04-19 RX ADMIN — METOPROLOL TARTRATE 25 MG: 25 TABLET, FILM COATED ORAL at 08:04

## 2023-04-19 RX ADMIN — SULFAMETHOXAZOLE AND TRIMETHOPRIM 1 TABLET: 800; 160 TABLET ORAL at 08:04

## 2023-04-19 RX ADMIN — SENNOSIDES AND DOCUSATE SODIUM 2 TABLET: 50; 8.6 TABLET ORAL at 08:04

## 2023-04-19 RX ADMIN — IPRATROPIUM BROMIDE AND ALBUTEROL SULFATE 3 ML: 2.5; .5 SOLUTION RESPIRATORY (INHALATION) at 08:04

## 2023-04-19 RX ADMIN — IPRATROPIUM BROMIDE AND ALBUTEROL SULFATE 3 ML: 2.5; .5 SOLUTION RESPIRATORY (INHALATION) at 12:04

## 2023-04-19 RX ADMIN — IPRATROPIUM BROMIDE AND ALBUTEROL SULFATE 3 ML: 2.5; .5 SOLUTION RESPIRATORY (INHALATION) at 04:04

## 2023-04-19 RX ADMIN — HYDROXYZINE HYDROCHLORIDE 25 MG: 25 TABLET ORAL at 08:04

## 2023-04-19 RX ADMIN — MELATONIN TAB 3 MG 9 MG: 3 TAB at 08:04

## 2023-04-19 RX ADMIN — IPRATROPIUM BROMIDE AND ALBUTEROL SULFATE 3 ML: 2.5; .5 SOLUTION RESPIRATORY (INHALATION) at 03:04

## 2023-04-19 RX ADMIN — ACETYLCYSTEINE 4 ML: 100 SOLUTION ORAL; RESPIRATORY (INHALATION) at 12:04

## 2023-04-19 RX ADMIN — ASPIRIN 81 MG CHEWABLE TABLET 81 MG: 81 TABLET CHEWABLE at 08:04

## 2023-04-19 RX ADMIN — OXYCODONE HYDROCHLORIDE 5 MG: 5 TABLET ORAL at 08:04

## 2023-04-19 RX ADMIN — CHLORHEXIDINE GLUCONATE 0.12% ORAL RINSE 15 ML: 1.2 LIQUID ORAL at 08:04

## 2023-04-19 RX ADMIN — FUROSEMIDE 40 MG: 40 TABLET ORAL at 08:04

## 2023-04-19 RX ADMIN — CLOPIDOGREL BISULFATE 75 MG: 75 TABLET ORAL at 08:04

## 2023-04-19 RX ADMIN — FAMOTIDINE 20 MG: 10 INJECTION, SOLUTION INTRAVENOUS at 08:04

## 2023-04-19 RX ADMIN — ACETYLCYSTEINE 4 ML: 100 SOLUTION ORAL; RESPIRATORY (INHALATION) at 03:04

## 2023-04-19 RX ADMIN — ATORVASTATIN CALCIUM 80 MG: 40 TABLET, FILM COATED ORAL at 08:04

## 2023-04-19 RX ADMIN — Medication 10 ML: at 11:04

## 2023-04-19 RX ADMIN — Medication 10 ML: at 05:04

## 2023-04-19 NOTE — CONSULTS
Inpatient Radiology Pre-procedure Note    History of Present Illness:  Fareed Richard Jr. is a 73 y.o. male with pertinent PMHx of AoC hypoxemic/hypercapnic respiratory failure, tracheostomy aspirate GNR and small Rt-sided and moderate Lt-sided pleural effusions which are likely contributing to the patients presenting symptoms. Pt is now s/p US-guided percutaneous Lt posterolateral-approach diagnostic and therapeutic thoracentesis with local anesthetic only complicated by asymptomatic Lt PNX. Pt reports no change in CP or SOB, no increase in O2 requirements and actually reports symptom improvement post thoracentesis despite Lt PNX which has been unchanged (ie not enlarging/progressing) over 24 hours suggesting 'trapped lung' and ex-vacuo PNX.     A new impatent IR consult received for CT-guided placement of an 8-Fr percutaneous Lt midclavicular line, 1st intercostal space-approach pleural drainage catheter.    Admission H&P reviewed.  Past Medical History:   Diagnosis Date    Cancer     skin to Rt forearm and face    COPD (chronic obstructive pulmonary disease)     Hyperlipidemia     Hypertension     Pseudoaneurysm      Past Surgical History:   Procedure Laterality Date    ABDOMINAL SURGERY      stents placed in liver and large intestines, per patient    ANGIOGRAPHY OF AORTIC ARCH  3/27/2023    Procedure: Aortogram, Aortic Arch;  Surgeon: Tim Bhatt MD;  Location: [facility] CATH LAB;  Service: Cardiology;;    AORTOGRAPHY WITH SERIALOGRAPHY N/A 3/27/2023    Procedure: AORTOGRAM, WITH SERIALOGRAPHY;  Surgeon: Tim Bhatt MD;  Location: [facility] CATH LAB;  Service: Cardiology;  Laterality: N/A;    CAROTID STENT      COLONOSCOPY N/A 09/27/2022    Procedure: COLONOSCOPY;  Surgeon: Donnie Peterson MD;  Location: [facility] ENDO (2ND FLR);  Service: Endoscopy;  Laterality: N/A;    COLONOSCOPY N/A 09/30/2022    Procedure: COLONOSCOPY;  Surgeon: Joy Cabrera MD;  Location: [facility] ENDO (2ND FLR);  Service: Endoscopy;   Laterality: N/A;    CORONARY STENT PLACEMENT  01/2000    DISSECTION OF NECK Bilateral 01/04/2022    Procedure: DISSECTION, NECK;  Surgeon: Naeem Smalls MD;  Location: [facility] OR 2ND FLR;  Service: ENT;  Laterality: Bilateral;    ESOPHAGOGASTRODUODENOSCOPY N/A 09/27/2022    Procedure: EGD (ESOPHAGOGASTRODUODENOSCOPY);  Surgeon: Donnie Peterson MD;  Location: [facility] ENDO (2ND FLR);  Service: Endoscopy;  Laterality: N/A;    ESOPHAGOGASTRODUODENOSCOPY N/A 09/30/2022    Procedure: EGD (ESOPHAGOGASTRODUODENOSCOPY);  Surgeon: Joy Cabrera MD;  Location: [facility] ENDO (2ND FLR);  Service: Endoscopy;  Laterality: N/A;    ESOPHAGOGASTRODUODENOSCOPY W/ PEG N/A 01/04/2022    Procedure: EGD, WITH PEG TUBE INSERTION;  Surgeon: Anthony Calabrese MD;  [facility];  Service: General;  Laterality: N/A;    EYE SURGERY      Cataract bilateral    femoral stents      bilateral    FLAP PROCEDURE N/A 01/03/2022    Procedure: CREATION, FREE FLAP;  Surgeon: Naeem Smalls MD;  Location: [facility] OR [facility] FLR;  Service: ENT;  Laterality: N/A;    FLAP PROCEDURE Right 01/04/2022    Procedure: CREATION, FREE FLAP;  Surgeon: Stacey Conde MD;  [facility];  Service: ENT;  Laterality: Right;  Ischemic start 1203  Ischemic stop 1353    GLOSSECTOMY N/A 01/04/2022    Procedure: GLOSSECTOMY;  Surgeon: Naeem Smalls MD;  Location: [facility] OR 2ND FLR;  Service: ENT;  Laterality: N/A;    LEFT HEART CATHETERIZATION Left 3/23/2023    Procedure: Left heart cath;  Surgeon: Tacos Benitez MD;  Location: [facility] CATH LAB;  Service: Cardiology;  Laterality: Left;    PERCUTANEOUS TRANSLUMINAL BALLOON ANGIOPLASTY OF CORONARY ARTERY Left 3/27/2023    Procedure: Angioplasty-coronary;  Surgeon: Tim Bhatt MD;  Location: [facility] CATH LAB;  Service: Cardiology;  Laterality: Left;  not before 9am, R rad access, 6 Fr EBU 3.5 guide    RADICAL NECK DISSECTION Left 01/03/2022    Procedure: DISSECTION, NECK, RADICAL;  Surgeon: Naeem  DINESH Smalls MD;  Location: [facility] OR [address] [place] [address];  Service: ENT;  Laterality: Left;    SKIN BIOPSY      SKIN CANCER EXCISION      STENT, CORONARY, MULTI VESSEL  3/27/2023    Procedure: Stent, Coronary, Multi Vessel;  Surgeon: Tim Bhatt MD;  Location: [facility] CATH LAB;  Service: Cardiology;;    TRACHEOTOMY N/A 2022    Procedure: TRACHEOTOMY;  Surgeon: Naeem Smalls MD;  Location: [facility] OR [address] [place] [address];  Service: ENT;  Laterality: N/A;    VASCULAR SURGERY       Review of Systems:   As documented in primary team H&P    Home Meds:   Prior to Admission medications    Medication Sig Start Date End Date Taking? Authorizing Provider   amLODIPine (NORVASC) 10 MG tablet Take 10 mg by mouth. 23  Yes Historical Provider   atorvastatin (LIPITOR) 20 MG tablet 1 tablet (20 mg total) by Per G Tube route once daily. 1/20/22 3/18/23 Yes Tasha Chaudhry NP   clopidogreL (PLAVIX) 75 mg tablet Take 75 mg by mouth once daily.   Yes Historical Provider   glycopyrrolate (CUVPOSA) 1 mg/5 mL (0.2 mg/mL) Soln Take 5 mLs (1 mg total) by mouth 3 (three) times daily as needed (TO REDUCE SECRETIONS). 22  Yes Zaki Brunson Jr., MD   guaiFENesin 200 mg/5 mL Liqd Take 400 mg by mouth every 4 (four) hours as needed (for secretions). 3/14/22  Yes Christopher Jay MD   hydrOXYzine HCL (ATARAX) 25 MG tablet SMARTSI.5 Tablet(s) Gastro Tube Every 8 Hours PRN 22  Yes Historical Provider   melatonin (MELATIN) 3 mg tablet 2 tablets (6 mg total) by Per G Tube route nightly. 22  Yes Tasha Chaudhry NP   metoprolol succinate (TOPROL-XL) 200 MG 24 hr tablet Take 200 mg by mouth 2 (two) times daily. 22  Yes Historical Provider   multivit-min/FA/lycopen/lutein (CENTRUM SILVER MEN ORAL) Take by mouth.   Yes Historical Provider   omeprazole (PRILOSEC) 40 MG capsule Take 40 mg (contents of one capsule) twice daily through PEG tube. Mix contents of capsule with 10 mL applesauce. 22  Yes Brenda Saldivar MD   sodium chloride 3% 3  % nebulizer solution Take 4 mLs by nebulization 2 (two) times a day. 10/12/22  Yes MD MARIAH BarrettXELA INHUB 250-50 mcg/dose diskus inhaler SMARTSI Puff(s) By Mouth Every 12 Hours 22  Yes Historical Provider   bisacodyL (DULCOLAX) 10 mg Supp Place 1 suppository (10 mg total) rectally daily as needed. 22   Tasha Chaudhry NP   oxyCODONE (ROXICODONE) 5 mg/5 mL Soln 5 mLs (5 mg total) by Per G Tube route every 4 (four) hours as needed (Pain).  Patient not taking: Reported on 2023   Zaki Brunson Jr., MD   polyethylene glycol (GLYCOLAX) 17 gram PwPk 17 g by Per G Tube route 2 (two) times daily. 22   Tasha Chaudhry NP   sodium chloride (OCEAN) 0.65 % nasal spray 1 spray by Nasal route as needed for Congestion.  Patient not taking: Reported on 2023   Tasha Chaudhry NP   losartan (COZAAR) 50 MG tablet 1 tablet (50 mg total) by Per G Tube route once daily. 22  Tasha Chaudhry NP   metoprolol tartrate (LOPRESSOR) 100 MG tablet 1 tablet (100 mg total) by Per G Tube route 2 (two) times daily. 22  Tasha Chaudhry NP     Scheduled Meds:    acetylcysteine 100 mg/ml (10%)  4 mL Nebulization Q8H    albuterol-ipratropium  3 mL Nebulization Q4H    aspirin  81 mg Oral Daily    atorvastatin  80 mg Per G Tube Daily    chlorhexidine  15 mL Mouth/Throat BID    clopidogreL  75 mg Oral Daily    famotidine (PF)  20 mg Intravenous BID    ferrous sulfate  1 tablet Per G Tube Daily    furosemide  40 mg Oral Daily    melatonin  9 mg Per G Tube Nightly    metoprolol tartrate  25 mg Per G Tube BID    senna-docusate 8.6-50 mg  2 tablet Per G Tube BID    sodium chloride 0.9%  10 mL Intravenous Q6H    sulfamethoxazole-trimethoprim 800-160mg  1 tablet Per G Tube BID     Continuous Infusions:   PRN Meds:acetaminophen, albuterol-ipratropium, atropine, bisacodyL, [] fentaNYL **FOLLOWED BY** fentaNYL, guaiFENesin 100 mg/5 ml, hydrOXYzine HCL, insulin aspart  U-100, loperamide, lorazepam, naloxone, nitroGLYCERIN, ondansetron, oxyCODONE, phenyleph-min oil-petrolatum, Flushing PICC Protocol **AND** sodium chloride 0.9% **AND** sodium chloride 0.9%    Anticoagulants/Antiplatelets: aspirin and Plavix    Allergies:   Review of patient's allergies indicates:   Allergen Reactions    Ativan [lorazepam] Anxiety     Sedation Hx: have not been any systemic reactions    Labs:  No results for input(s): INR in the last 168 hours.    Invalid input(s):  PT,  PTT    Recent Labs   Lab 04/19/23  0347   WBC 4.99   HGB 9.3*   HCT 29.6*   MCV 94         Recent Labs   Lab 04/19/23  0347   *   *   K 4.8   CL 97   CO2 32*   BUN 23   CREATININE 1.0   CALCIUM 9.4   ALT 22   AST 30   ALBUMIN 2.0*   BILITOT 0.2     Vitals:  Temp: 98.4 °F (36.9 °C) (04/19/23 1100)  Pulse: 79 (04/19/23 1213)  Resp: 16 (04/19/23 1213)  BP: 107/60 (04/19/23 1200)  SpO2: 100 % (04/19/23 1213)     Physical Exam:  General: no acute distress  Mental Status: alert and oriented to person, place and time  HEENT: normocephalic, atraumatic  Chest: non-labored breathing  Heart: regular heart rate  Abdomen: nondistended  Extremity: moves all extremities    A/P:  73 y.o. male who is s/p US-guided percutaneous Lt posterolateral-approach diagnostic and therapeutic thoracentesis with local anesthetic only complicated by asymptomatic Lt PNX with request for Lt pleural drainage catheter placement.      Trapped lung/ex-vacuo vs post-procedural Lt PNX  - Will attempt CT-guided placement of an 8-Fr percutaneous Lt midclavicular line, 1st intercostal space-approach pleural drainage catheter with local anesthetic and up to moderate conscious sedation.    Of note, after d/w patient regarding diagnosis and reasoning behind procedure request, the patient has opted to forgo the procedure at this time. Please feel free to contact IR if needed in the future.      Thank you for considering IR for the care of your patient.       Milo Cabrera MD  Interventional Radiology

## 2023-04-19 NOTE — SUBJECTIVE & OBJECTIVE
Interval History: Pt feeling fine this morning, but developed ptx after thora. IR to evaluated for Chest tube placement today.    Review of Systems  Objective:     Vital Signs (Most Recent):  Temp: 98.4 °F (36.9 °C) (04/19/23 1100)  Pulse: 79 (04/19/23 1213)  Resp: 16 (04/19/23 1213)  BP: 107/60 (04/19/23 1200)  SpO2: 100 % (04/19/23 1213) Vital Signs (24h Range):  Temp:  [97.8 °F (36.6 °C)-98.5 °F (36.9 °C)] 98.4 °F (36.9 °C)  Pulse:  [] 79  Resp:  [13-31] 16  SpO2:  [88 %-100 %] 100 %  BP: ()/(52-89) 107/60     Weight: 63 kg (138 lb 14.2 oz)  Body mass index is 21.12 kg/m².    Intake/Output Summary (Last 24 hours) at 4/19/2023 1307  Last data filed at 4/19/2023 1100  Gross per 24 hour   Intake 1610 ml   Output 4050 ml   Net -2440 ml      Physical Exam  Vitals reviewed.   Constitutional:       General: He is not in acute distress.     Appearance: He is ill-appearing (chronic).   Pulmonary:      Effort: Pulmonary effort is normal. No respiratory distress.   Abdominal:      Palpations: Abdomen is soft.      Tenderness: There is no abdominal tenderness.   Skin:     General: Skin is warm and dry.   Neurological:      General: No focal deficit present.      Mental Status: He is alert.   Psychiatric:         Mood and Affect: Mood normal.         Behavior: Behavior normal.   Significant Labs: All pertinent labs within the past 24 hours have been reviewed.    Significant Imaging: I have reviewed all pertinent imaging results/findings within the past 24 hours.

## 2023-04-19 NOTE — NURSING
Nurses Note -- 4 Eyes      4/19/2023   6:27 AM      Skin assessed during: Q Shift Change      [x] No Pressure Injuries Present    []Prevention Measures Documented      [] Yes- Altered Skin Integrity Present or Discovered   [] LDA Added if Not in Epic (Describe Wound)   [] New Altered Skin Integrity was Present on Admit and Documented in LDA   [] Wound Image Taken    Wound Care Consulted? No    Attending Nurse:  Gemma Beaulieu RN     Second RN/Staff Member:  KAMILAH Mauro

## 2023-04-19 NOTE — ASSESSMENT & PLAN NOTE
Vent Mode: PS/CPAP  Oxygen Concentration (%):  [40-98] 98  PEEP/CPAP:  [8 cmH20] 8 cmH20  Pressure Support:  [8 cmH20] 8 cmH20    - was on trache collar prior to admission.  Recurring hypercapnic respiratory failure requiring continuous PS  - tolerating PS well 24/7  - trache collar since 4/18  - COPD treatment as above  - sputum culture- with pseudomonas  - cxr with chronic left effusion.  pocus 4/18/23 with 3 cm layered effusion.  S/p thoracentesis on 4/18.    - lasix po today

## 2023-04-19 NOTE — PROGRESS NOTES
[facility]  Pulmonology  Progress Note    Patient Name: Fareed Rihcard Jr.  MRN: [id number]  Admission Date: 3/18/2023  Hospital Length of Stay: 31 days  Code Status: Full Code  Attending Provider: Nikita Castillo III, MD  Primary Care Provider: Kodi Tubbs MD   Principal Problem: Acute on chronic respiratory failure with hypoxia and hypercapnia    Subjective:     Interval History: s/p  thoracentesis with 700 cc of serous fluid aspirated.  +pneumo afterward.  Off vent support over past 24 hours.  Feeling better after thora.        Objective:     Vital Signs (Most Recent):  Temp: 98.4 °F (36.9 °C) (04/19/23 1100)  Pulse: 79 (04/19/23 1213)  Resp: 16 (04/19/23 1213)  BP: 107/60 (04/19/23 1200)  SpO2: 100 % (04/19/23 1213) Vital Signs (24h Range):  Temp:  [97.8 °F (36.6 °C)-98.5 °F (36.9 °C)] 98.4 °F (36.9 °C)  Pulse:  [] 79  Resp:  [13-31] 16  SpO2:  [88 %-100 %] 100 %  BP: ()/(52-89) 107/60     Weight: 63 kg (138 lb 14.2 oz)  Body mass index is 21.12 kg/m².      Intake/Output Summary (Last 24 hours) at 4/19/2023 1324  Last data filed at 4/19/2023 1100  Gross per 24 hour   Intake 1610 ml   Output 4050 ml   Net -2440 ml       Physical Exam  Vitals reviewed.   Constitutional:       Appearance: He is ill-appearing (chronically ill). He is not toxic-appearing.   HENT:      Head: Normocephalic and atraumatic.      Nose: Nose normal. No congestion.      Mouth/Throat:      Mouth: Mucous membranes are moist.      Pharynx: Oropharynx is clear. No oropharyngeal exudate.   Eyes:      Extraocular Movements: Extraocular movements intact.      Conjunctiva/sclera: Conjunctivae normal.      Pupils: Pupils are equal, round, and reactive to light.   Neck:      Comments: Cuffed 6 trach.defated  Cardiovascular:      Rate and Rhythm: Normal rate and regular rhythm.      Pulses: Normal pulses.      Heart sounds: No murmur heard.  Pulmonary:      Effort: Tachypnea present. No respiratory distress.      Breath  sounds: No wheezing or rhonchi.   Abdominal:      General: Abdomen is flat. Bowel sounds are normal.      Palpations: Abdomen is soft.   Musculoskeletal:         General: No swelling.   Skin:     General: Skin is warm and dry.      Capillary Refill: Capillary refill takes less than 2 seconds.   Neurological:      General: No focal deficit present.      Mental Status: He is oriented to person, place, and time.     Review of Systems    Vents:  Vent Mode: PS/CPAP (04/18/23 1708)  Ventilator Initiated: Yes (04/11/23 0936)  Set Rate: 5736.1 BPM (04/18/23 1708)  Vt Set: 400 mL (04/14/23 0821)  Pressure Support: 8 cmH20 (04/18/23 1708)  PEEP/CPAP: 8 cmH20 (04/18/23 1708)  Oxygen Concentration (%): 98 (04/19/23 1213)  Peak Airway Pressure: 17.1 cmH20 (04/18/23 0422)  Total Ve: 8.8 L/m (04/18/23 0422)  F/VT Ratio<105 (RSBI): (!) 86.76 (04/18/23 0422)    Lines/Drains/Airways       Peripherally Inserted Central Catheter Line  Duration             PICC Triple Lumen 03/20/23 2145 right basilic 29 days              Drain  Duration                  Gastrostomy/Enterostomy 01/04/22 Percutaneous endoscopic gastrostomy (PEG)  days    Male External Urinary Catheter 04/12/23 1900 6 days              Airway  Duration             Adult Surgical Airway 03/16/23 1014 Shiley Cuffed 6.0 34 days                    Significant Labs:    CBC/Anemia Profile:  Recent Labs   Lab 04/18/23 0319 04/19/23  0347   WBC 4.22 4.99   HGB 8.6* 9.3*   HCT 29.2* 29.6*    272   MCV 96 94   RDW 17.3* 17.0*        Chemistries:  Recent Labs   Lab 04/18/23 0319 04/18/23  0707 04/19/23  0347   * 134* 134*   K 5.4* 5.0 4.8   CL 99 99 97   CO2 28 28 32*   BUN 21 22 23   CREATININE 0.9 0.9 1.0   CALCIUM 9.7 9.3 9.4   ALBUMIN 2.0* 1.9* 2.0*   PROT 6.2 6.2 6.1   BILITOT 0.3 0.3 0.2   ALKPHOS 110 109 114   ALT 24 20 22   AST 32 32 30     Thoracentesis  Wbc 315 (s14, l63, m23); amylase 16; glucose of 120; ldh 169; protein 3  Culture ngtd  Cytology  pending    All pertinent labs within the past 24 hours have been reviewed.    Significant Imaging:  I have reviewed all pertinent imaging results/findings within the past 24 hours.  CXR: I have reviewed all pertinent results/findings within the past 24 hours and my personal findings are:  persistent left pneumo      ABG  No results for input(s): PH, PO2, PCO2, HCO3, BE in the last 168 hours.  Assessment/Plan:     ENT  Tracheostomy present  -Tracheostomy in place since surgical resection for head/neck cancer in January 2022.  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  -cuffed deflated since off vent.    -doing well on trache collar    Pulmonary  * Acute on chronic respiratory failure with hypoxia and hypercapnia  Vent Mode: PS/CPAP  Oxygen Concentration (%):  [40-98] 98  PEEP/CPAP:  [8 cmH20] 8 cmH20  Pressure Support:  [8 cmH20] 8 cmH20    - was on trache collar prior to admission.  Recurring hypercapnic respiratory failure requiring continuous PS  - tolerating PS well 24/7  - trache collar since 4/18  - COPD treatment as above  - sputum culture- with pseudomonas  - cxr with chronic left effusion.  pocus 4/18/23 with 3 cm layered effusion.  S/p thoracentesis on 4/18.    - lasix po today    Postprocedural pneumothorax  -trapped lung vs post procedure  -ir to place pleural drain today.      Pleural effusion  -Chronic left effusion dating back as far as 12/2021.    -thoracentesis 4/18 with 700 cc fluid aspirated  -left pneumo after thora      Hemoptysis  Likely secondary to lower respiratory tract infection in the setting of ASA/Plavix.  It seems less likely to be physical complication of the tracheostomy tube itself.    · Antibiotics for pneumonia.  · Check sputum for culture, including AFB for possible MAC infection.  · Respiratory viral panel.  · OK to resume anti-platelet medications; monitor for bleeding   · No need for  additional nebulized TXA.  No bleeding since 3/19      Cardiac/Vascular  NSTEMI (non-ST elevated myocardial infarction)  -S/p stent  -dapt           Alexander Mcmanus MD  Pulmonology  [facility] - Intensive Care      Critical Care Time: 35  minutes  Critical secondary to respiratory failure     Critical care was time spent personally by me on the following activities: development of treatment plan with patient or surrogate and bedside caregivers, discussions with consultants, evaluation of patient's response to treatment, examination of patient, ordering and performing treatments and interventions, ordering and review of laboratory studies, ordering and review of radiographic studies, pulse oximetry, re-evaluation of patient's condition.  This critical care time did not overlap with that of any other provider or involve time for any procedures.

## 2023-04-19 NOTE — NURSING
Tube feeds placed on hold and free water flushes for lunch not given in anticipation of chest tube placement this afternoon.

## 2023-04-19 NOTE — ASSESSMENT & PLAN NOTE
-Chronic left effusion dating back as far as 12/2021.    -thoracentesis 4/18 with 700 cc fluid aspirated  -left pneumo after thora

## 2023-04-19 NOTE — ASSESSMENT & PLAN NOTE
At home is on 5L O2 via trach and O2 sats typically in high 80s low 90s. Worsening respiration this admission due to aspiration of blood and likely food aspiration. Required ventilator from 3/21 to 3/23, subsequently weaned. Treated empirically for pneumonia. Respiratory cultures show Stenotrophomonas which was not treated  - started bactrim to treat Stenotrophomonas  - worsening hypoxia and hypercapnia on 4/11. This could be related to trazodone use (attempted 4/10 PM for sleep)   - transferred to ICU and placed on ventilator   - treating potential pneumonia. Known Stenotrophomonas- treating with bactrim. Trach aspirate with GNR- tx with cefepime completed    - does not appear that he has had another MI   - significantly improved    - Pulmonary is following

## 2023-04-19 NOTE — SUBJECTIVE & OBJECTIVE
Interval History: s/p  thoracentesis with 700 cc of serous fluid aspirated.  +pneumo afterward.  Off vent support over past 24 hours.  Feeling better after thora.        Objective:     Vital Signs (Most Recent):  Temp: 98.4 °F (36.9 °C) (04/19/23 1100)  Pulse: 79 (04/19/23 1213)  Resp: 16 (04/19/23 1213)  BP: 107/60 (04/19/23 1200)  SpO2: 100 % (04/19/23 1213) Vital Signs (24h Range):  Temp:  [97.8 °F (36.6 °C)-98.5 °F (36.9 °C)] 98.4 °F (36.9 °C)  Pulse:  [] 79  Resp:  [13-31] 16  SpO2:  [88 %-100 %] 100 %  BP: ()/(52-89) 107/60     Weight: 63 kg (138 lb 14.2 oz)  Body mass index is 21.12 kg/m².      Intake/Output Summary (Last 24 hours) at 4/19/2023 1324  Last data filed at 4/19/2023 1100  Gross per 24 hour   Intake 1610 ml   Output 4050 ml   Net -2440 ml       Physical Exam  Vitals reviewed.   Constitutional:       Appearance: He is ill-appearing (chronically ill). He is not toxic-appearing.   HENT:      Head: Normocephalic and atraumatic.      Nose: Nose normal. No congestion.      Mouth/Throat:      Mouth: Mucous membranes are moist.      Pharynx: Oropharynx is clear. No oropharyngeal exudate.   Eyes:      Extraocular Movements: Extraocular movements intact.      Conjunctiva/sclera: Conjunctivae normal.      Pupils: Pupils are equal, round, and reactive to light.   Neck:      Comments: Cuffed 6 trach.defated  Cardiovascular:      Rate and Rhythm: Normal rate and regular rhythm.      Pulses: Normal pulses.      Heart sounds: No murmur heard.  Pulmonary:      Effort: Tachypnea present. No respiratory distress.      Breath sounds: No wheezing or rhonchi.   Abdominal:      General: Abdomen is flat. Bowel sounds are normal.      Palpations: Abdomen is soft.   Musculoskeletal:         General: No swelling.   Skin:     General: Skin is warm and dry.      Capillary Refill: Capillary refill takes less than 2 seconds.   Neurological:      General: No focal deficit present.      Mental Status: He is oriented  to person, place, and time.     Review of Systems    Vents:  Vent Mode: PS/CPAP (04/18/23 1708)  Ventilator Initiated: Yes (04/11/23 0936)  Set Rate: 5736.1 BPM (04/18/23 1708)  Vt Set: 400 mL (04/14/23 0821)  Pressure Support: 8 cmH20 (04/18/23 1708)  PEEP/CPAP: 8 cmH20 (04/18/23 1708)  Oxygen Concentration (%): 98 (04/19/23 1213)  Peak Airway Pressure: 17.1 cmH20 (04/18/23 0422)  Total Ve: 8.8 L/m (04/18/23 0422)  F/VT Ratio<105 (RSBI): (!) 86.76 (04/18/23 0422)    Lines/Drains/Airways       Peripherally Inserted Central Catheter Line  Duration             PICC Triple Lumen 03/20/23 2145 right basilic 29 days              Drain  Duration                  Gastrostomy/Enterostomy 01/04/22 Percutaneous endoscopic gastrostomy (PEG)  days    Male External Urinary Catheter 04/12/23 1900 6 days              Airway  Duration             Adult Surgical Airway 03/16/23 1014 Shiley Cuffed 6.0 34 days                    Significant Labs:    CBC/Anemia Profile:  Recent Labs   Lab 04/18/23 0319 04/19/23 0347   WBC 4.22 4.99   HGB 8.6* 9.3*   HCT 29.2* 29.6*    272   MCV 96 94   RDW 17.3* 17.0*        Chemistries:  Recent Labs   Lab 04/18/23 0319 04/18/23  0707 04/19/23 0347   * 134* 134*   K 5.4* 5.0 4.8   CL 99 99 97   CO2 28 28 32*   BUN 21 22 23   CREATININE 0.9 0.9 1.0   CALCIUM 9.7 9.3 9.4   ALBUMIN 2.0* 1.9* 2.0*   PROT 6.2 6.2 6.1   BILITOT 0.3 0.3 0.2   ALKPHOS 110 109 114   ALT 24 20 22   AST 32 32 30     Thoracentesis  Wbc 315 (s14, l63, m23); amylase 16; glucose of 120; ldh 169; protein 3  Culture ngtd  Cytology pending    All pertinent labs within the past 24 hours have been reviewed.    Significant Imaging:  I have reviewed all pertinent imaging results/findings within the past 24 hours.  CXR: I have reviewed all pertinent results/findings within the past 24 hours and my personal findings are:  persistent left pneumo

## 2023-04-19 NOTE — PLAN OF CARE
CM notified pt's wife Bridgett @ [phone] that patient will not DC today.  Message left for ROMARIO at [facility].

## 2023-04-19 NOTE — ASSESSMENT & PLAN NOTE
Troponin elevated when pt was upgraded to the ICU. At that time it was thought to be due to demand, however he had persistent chest pain concerning for ACS. Was started on heparin gtt and tolerated. Subsequently went to [facility] which showed diffuse disease. Pt not a candidate for CABG. He subsequently underwent PCI of LAD and LCx.  - Continue asa, plavix, statin

## 2023-04-19 NOTE — NURSING
[facility] [place], [place]  Nurses Note -- 4 Eyes      4/19/2023       Skin assessed on: Q Shift      [x] No Pressure Injuries Present    [x]Prevention Measures Documented    [] Yes LDA  for Pressure Injury Previously documented     [] Yes New Pressure Injury Discovered   [] LDA for New Pressure Injury Added      Attending RN:  Zunilda Leavitt RN     Second RN:  Gemma Beaulieu RN

## 2023-04-19 NOTE — PROGRESS NOTES
[facility] Medicine  Progress Note    Patient Name: Fareed Richard Jr.  MRN: [id number]  Patient Class: IP- Inpatient   Admission Date: 3/18/2023  Length of Stay: 31 days  Attending Physician: Nikita Castillo III, MD  Primary Care Provider: Kodi Tubbs MD        Subjective:     Principal Problem:Acute on chronic respiratory failure with hypoxia and hypercapnia        HPI:  Fareed Richard Jr. 73 y.o. 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  Per his wife 4 days ago he began to have pink tinged sputum via his trach.  He was seen by his ENT 2 days ago with a scope exam and a trach exchange without evidence of bleeding.  This morning at 3:00 a.m. he developed bright red blood via trach which resolved without intervention.  It is since not recurred.  He takes a daily aspirin.  He receives all medications via G-tube.  His tube feeding regimen consists of 6 cans of Isosource daily with 120 cc free water boluses.  He denies fever chest pain shortness of breath nausea vomiting abdominal pain leg swelling syncope dizziness dysuria melena hematuria hematemesis.    In the ED, hemoglobin 7.6 INR 1.0 BUN 50 creatinine 0.7  troponin negative BRCA negative O positive type and screen chest x-ray without detrimental change hypotensive to 85/54.      Overview/Hospital Course:  72 yo M w squamous CA of tongue s/p glossectomy and reconstructive flap with trach, CAD, chronic hypoxic respiratory failure (on 5L at home) and with peg presented for evaluation of hemoptysis 2 days after tracheostomy change in clinic. Transferred to ICU 3/19 when markedly hypotensive.  Unclear if due to hemorrhage, cardiogenic or septic shock.  Did require PRBC and TXA nebs but didn't seem like sufficient bleeding to cause shock.  Bleeding stopped and ENT following.  CTA shows either significant mucus  plugging or bibasilar pneumonia - pulmonology favored pneumonia.  Trach aspirate with Stenotrophomonas, Achromobacter, and Candida. Also urine culture growing Enterococcus.  He is on broad spectrum antibiotics including bactrim. Developed NSTEMI. Cardiology consulted. [facility] 3/23 which showed distal LM 30%, mild LAD 90% bifurcation lesion with D1, Cx mid 80%, RCA moderate diffuse disease with long 70% and some left to right collateral. All vessels heavily calcified. Not a candidate for CABG but plan for staged PCI. Continued on heparin drip, asa/plavix. Vasopressors weaned. [facility] 3/27 PCI to LAD and LCx. Post operatively he was hemodynamically unstable and anemic. Stat CTA showed RP hematoma near L kidney without acute bleeding. Pt transfused supportively. He improved over the next few days. Stepped down to floor. Remains on trach collar, higher than home O2 requirements. He has delirium as well and poor sleep. Attempted trazodone. The following morning, he was much more lethargic and was transferred to ICU for worsening acute on chronic hypoxic/hypercapnic respiratory failure. This improved after 1 day on the vent.     Trach aspirate with GNR; remains on cefepime in addition to bactrim for prior Stenotrophomonas. Improving at 98%, wean O2. Appears Steno and Achro being effectively treated with bactrim, now growing pseudomonas and has been on cefepime, improving. Per ICU discussion, wife now more understanding he needs LTAC placement.       Interval History: Pt feeling fine this morning, but developed ptx after thora. IR to evaluated for Chest tube placement today.    Review of Systems  Objective:     Vital Signs (Most Recent):  Temp: 98.4 °F (36.9 °C) (04/19/23 1100)  Pulse: 79 (04/19/23 1213)  Resp: 16 (04/19/23 1213)  BP: 107/60 (04/19/23 1200)  SpO2: 100 % (04/19/23 1213) Vital Signs (24h Range):  Temp:  [97.8 °F (36.6 °C)-98.5 °F (36.9 °C)] 98.4 °F (36.9 °C)  Pulse:  [] 79  Resp:  [13-31] 16  SpO2:  [88 %-100  %] 100 %  BP: ()/(52-89) 107/60     Weight: 63 kg (138 lb 14.2 oz)  Body mass index is 21.12 kg/m².    Intake/Output Summary (Last 24 hours) at 4/19/2023 1307  Last data filed at 4/19/2023 1100  Gross per 24 hour   Intake 1610 ml   Output 4050 ml   Net -2440 ml      Physical Exam  Vitals reviewed.   Constitutional:       General: He is not in acute distress.     Appearance: He is ill-appearing (chronic).   Pulmonary:      Effort: Pulmonary effort is normal. No respiratory distress.   Abdominal:      Palpations: Abdomen is soft.      Tenderness: There is no abdominal tenderness.   Skin:     General: Skin is warm and dry.   Neurological:      General: No focal deficit present.      Mental Status: He is alert.   Psychiatric:         Mood and Affect: Mood normal.         Behavior: Behavior normal.   Significant Labs: All pertinent labs within the past 24 hours have been reviewed.    Significant Imaging: I have reviewed all pertinent imaging results/findings within the past 24 hours.      Assessment/Plan:      * Acute on chronic respiratory failure with hypoxia and hypercapnia  At home is on 5L O2 via trach and O2 sats typically in high 80s low 90s. Worsening respiration this admission due to aspiration of blood and likely food aspiration. Required ventilator from 3/21 to 3/23, subsequently weaned. Treated empirically for pneumonia. Respiratory cultures show Stenotrophomonas which was not treated  - started bactrim to treat Stenotrophomonas  - worsening hypoxia and hypercapnia on 4/11. This could be related to trazodone use (attempted 4/10 PM for sleep)   - transferred to ICU and placed on ventilator   - treating potential pneumonia. Known Stenotrophomonas- treating with bactrim. Trach aspirate with GNR- tx with cefepime completed    - does not appear that he has had another MI   - significantly improved    - Pulmonary is following       Pleural effusion  Pulmonology consulted who recommended thoracentesis.  "Performed 4/18 by IR. Pt subsequently developed persistent ptx, but respiratory wise no significant deterioration     -ir consulted for Chest tube eval      Retroperitoneal hematoma  RP hematoma discovered after LHC. See "acute blood loss anemia"      NSTEMI (non-ST elevated myocardial infarction)  See "coronary artery disease"      Anxiety  Anxiety waxes and wanes with clinical status. He also has delirium  - Stopped hydroxyzine as this could be contributing to delirium   - Prior bad reaction to ativan  - Attempted trazodone to help sleep and then had worsening encephalopathy and hypoxic/hypercapnic respiratory failure the next morning  - may try mirtazapine if this becomes an issue    Hemoptysis  -See acute blood loss anemia  -this has resolved     Pneumonia  3/22 Respiratory culture: Stenotrophomonas, Achromobacter--> on bactrim x14 days for this  4/11 Respiratory culture: pseudomonas -> on cefepime    PEG (percutaneous endoscopic gastrostomy) status  Continue medications via PEG. Does not take oral intake.   -On isosource 1.5 6 days daily with 120cc free water flushes via wife  -Will continue home tube feedings, with nutrition consulted    Tracheostomy present  See respiratory failure     ACP (advance care planning)  Advance Care Planning     Date: 04/10/2023  Palliative consulted, reviewed documentation. Full code. Needs home palliative follow up upon discharge. Time spent reviewing on 4/10/23= 5 minutes          Severe protein-calorie malnutrition  RD following. On tube feeds.      Secondary malignant neoplasm of cervical lymph node  Noted      Acute blood loss anemia  Pt presented with hemoptysis. Scopes prior to admit and by ENT during admit not showing clear source. Bleeding improved. Pt underwent LHC for NSTEMI. After that procedure he was found to have a large retroperitoneal hematoma. Since starting DAPT he has also had recurrent hemoptysis as well as melanic stools. Trach cuff reinflated 4/2 with " improvement in bleeding.  - trend CBC, transfuse prn  - Hgb now stable  - continue asa, plavix     COPD exacerbation  This seems resolved; however, did develop worsening hypoxic/hypercapnic respiratory failure  - coninue nebs      Other hyperlipidemia  -Continue home statin    Primary hypertension  Previously hypotensive, now normotensive  - continue metoprolol     Coronary artery disease involving native coronary artery of native heart with unstable angina pectoris  Troponin elevated when pt was upgraded to the ICU. At that time it was thought to be due to demand, however he had persistent chest pain concerning for ACS. Was started on heparin gtt and tolerated. Subsequently went to [facility] which showed diffuse disease. Pt not a candidate for CABG. He subsequently underwent PCI of LAD and LCx.  - Continue asa, plavix, statin    Squamous cell cancer of tongue  Dx 2021, now s/p total glossectomy, bilateral neck dissection, bilateral cervical facial flaps and anterolateral thigh flap reconstruction of glossectomy defect 1/4/22. Completed adjuvant chemoradiation. Had trach changed by ENT 2 days prior to admit and scope at that time showed no signs of bleeding despite hemoptysis present on admission.     Carotid stenosis  Continues on asa, plavix, statin       Peripheral vascular disease  Continues on asa, plavix, statin         VTE Risk Mitigation (From admission, onward)         Ordered     IP VTE LOW RISK PATIENT  Once         04/11/23 0909     Place sequential compression device  Until discontinued         03/18/23 1620     Place NIKITA hose  Until discontinued         03/18/23 1620                Discharge Planning   NISA:      Code Status: Full Code   Is the patient medically ready for discharge?:     Reason for patient still in hospital (select all that apply): Treatment, Imaging and Consult recommendations  Discharge Plan A: Long-term acute care facility (LTAC)   Discharge Delays: None known at this time        Critical  care time spent on the evaluation and treatment of severe organ dysfunction, review of pertinent labs and imaging studies, discussions with consulting providers and discussions with patient/family: 30 minutes.      Nikita Castillo III, MD  Department of Hospital Medicine   [facility] - Intensive Care

## 2023-04-19 NOTE — PLAN OF CARE
Problem: Adult Inpatient Plan of Care  Goal: Plan of Care Review  Outcome: Ongoing, Progressing  Goal: Patient-Specific Goal (Individualized)  Outcome: Ongoing, Progressing  Goal: Absence of Hospital-Acquired Illness or Injury  Outcome: Ongoing, Progressing  Goal: Optimal Comfort and Wellbeing  Outcome: Ongoing, Progressing  Goal: Readiness for Transition of Care  Outcome: Ongoing, Progressing     Problem: Fall Injury Risk  Goal: Absence of Fall and Fall-Related Injury  Outcome: Ongoing, Progressing     Problem: Skin Injury Risk Increased  Goal: Skin Health and Integrity  Outcome: Ongoing, Progressing     Problem: Infection  Goal: Absence of Infection Signs and Symptoms  Outcome: Ongoing, Progressing     Problem: Coping Ineffective  Goal: Effective Coping  Outcome: Ongoing, Progressing     Problem: Impaired Wound Healing  Goal: Optimal Wound Healing  Outcome: Ongoing, Progressing     Problem: Communication Impairment (Artificial Airway)  Goal: Effective Communication  Outcome: Ongoing, Progressing     Problem: Device-Related Complication Risk (Artificial Airway)  Goal: Optimal Device Function  Outcome: Ongoing, Progressing     Problem: Skin and Tissue Injury (Artificial Airway)  Goal: Absence of Device-Related Skin or Tissue Injury  Outcome: Ongoing, Progressing     Problem: Aspiration (Enteral Nutrition)  Goal: Absence of Aspiration Signs and Symptoms  Outcome: Ongoing, Progressing     Problem: Device-Related Complication Risk (Enteral Nutrition)  Goal: Safe, Effective Therapy Delivery  Outcome: Ongoing, Progressing     Problem: Feeding Intolerance (Enteral Nutrition)  Goal: Feeding Tolerance  Outcome: Ongoing, Progressing     Problem: Mobility Impairment  Goal: Optimal Mobility  Outcome: Ongoing, Progressing     Problem: Fluid Imbalance (Pneumonia)  Goal: Fluid Balance  Outcome: Ongoing, Progressing     Problem: Infection (Pneumonia)  Goal: Resolution of Infection Signs and Symptoms  Outcome: Ongoing,  Progressing     Problem: Respiratory Compromise (Pneumonia)  Goal: Effective Oxygenation and Ventilation  Outcome: Ongoing, Progressing

## 2023-04-19 NOTE — ASSESSMENT & PLAN NOTE
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  -cuffed deflated since off vent.    -doing well on trache collar

## 2023-04-19 NOTE — ASSESSMENT & PLAN NOTE
Pulmonology consulted who recommended thoracentesis. Performed 4/18 by IR. Pt subsequently developed persistent ptx, but respiratory wise no significant deterioration     -ir consulted for Chest tube eval

## 2023-04-20 LAB
ALBUMIN SERPL BCP-MCNC: 1.9 G/DL (ref 3.5–5.2)
ALP SERPL-CCNC: 99 U/L (ref 55–135)
ALT SERPL W/O P-5'-P-CCNC: 21 U/L (ref 10–44)
ANION GAP SERPL CALC-SCNC: 7 MMOL/L (ref 8–16)
AST SERPL-CCNC: 26 U/L (ref 10–40)
BASOPHILS # BLD AUTO: 0.03 K/UL (ref 0–0.2)
BASOPHILS NFR BLD: 0.6 % (ref 0–1.9)
BILIRUB SERPL-MCNC: 0.2 MG/DL (ref 0.1–1)
BUN SERPL-MCNC: 26 MG/DL (ref 8–23)
CALCIUM SERPL-MCNC: 8.9 MG/DL (ref 8.7–10.5)
CHLORIDE SERPL-SCNC: 96 MMOL/L (ref 95–110)
CHOLEST FLD-MCNC: 27 MG/DL
CO2 SERPL-SCNC: 32 MMOL/L (ref 23–29)
CREAT SERPL-MCNC: 0.9 MG/DL (ref 0.5–1.4)
DIFFERENTIAL METHOD: ABNORMAL
EOSINOPHIL # BLD AUTO: 0.2 K/UL (ref 0–0.5)
EOSINOPHIL NFR BLD: 4.7 % (ref 0–8)
ERYTHROCYTE [DISTWIDTH] IN BLOOD BY AUTOMATED COUNT: 16.8 % (ref 11.5–14.5)
EST. GFR  (NO RACE VARIABLE): >60 ML/MIN/1.73 M^2
GLUCOSE SERPL-MCNC: 125 MG/DL (ref 70–110)
HCT VFR BLD AUTO: 28.1 % (ref 40–54)
HGB BLD-MCNC: 8.6 G/DL (ref 14–18)
IMM GRANULOCYTES # BLD AUTO: 0.03 K/UL (ref 0–0.04)
IMM GRANULOCYTES NFR BLD AUTO: 0.6 % (ref 0–0.5)
LYMPHOCYTES # BLD AUTO: 0.5 K/UL (ref 1–4.8)
LYMPHOCYTES NFR BLD: 9.2 % (ref 18–48)
MCH RBC QN AUTO: 29.1 PG (ref 27–31)
MCHC RBC AUTO-ENTMCNC: 30.6 G/DL (ref 32–36)
MCV RBC AUTO: 95 FL (ref 82–98)
MONOCYTES # BLD AUTO: 0.7 K/UL (ref 0.3–1)
MONOCYTES NFR BLD: 14.8 % (ref 4–15)
NEUTROPHILS # BLD AUTO: 3.4 K/UL (ref 1.8–7.7)
NEUTROPHILS NFR BLD: 70.1 % (ref 38–73)
NRBC BLD-RTO: 0 /100 WBC
PLATELET # BLD AUTO: 257 K/UL (ref 150–450)
PMV BLD AUTO: 9.8 FL (ref 9.2–12.9)
POTASSIUM SERPL-SCNC: 4.7 MMOL/L (ref 3.5–5.1)
PROT SERPL-MCNC: 5.9 G/DL (ref 6–8.4)
RBC # BLD AUTO: 2.96 M/UL (ref 4.6–6.2)
SODIUM SERPL-SCNC: 135 MMOL/L (ref 136–145)
WBC # BLD AUTO: 4.88 K/UL (ref 3.9–12.7)

## 2023-04-20 PROCEDURE — A4216 STERILE WATER/SALINE, 10 ML: HCPCS | Performed by: HOSPITALIST

## 2023-04-20 PROCEDURE — 27000221 HC OXYGEN, UP TO 24 HOURS

## 2023-04-20 PROCEDURE — 85025 COMPLETE CBC W/AUTO DIFF WBC: CPT | Performed by: HOSPITALIST

## 2023-04-20 PROCEDURE — 25000003 PHARM REV CODE 250: Performed by: HOSPITALIST

## 2023-04-20 PROCEDURE — 80053 COMPREHEN METABOLIC PANEL: CPT | Performed by: HOSPITALIST

## 2023-04-20 PROCEDURE — 36415 COLL VENOUS BLD VENIPUNCTURE: CPT | Performed by: HOSPITALIST

## 2023-04-20 PROCEDURE — 94761 N-INVAS EAR/PLS OXIMETRY MLT: CPT

## 2023-04-20 PROCEDURE — 25000242 PHARM REV CODE 250 ALT 637 W/ HCPCS: Performed by: HOSPITALIST

## 2023-04-20 PROCEDURE — 99900026 HC AIRWAY MAINTENANCE (STAT)

## 2023-04-20 PROCEDURE — 25000242 PHARM REV CODE 250 ALT 637 W/ HCPCS: Performed by: INTERNAL MEDICINE

## 2023-04-20 PROCEDURE — 63600175 PHARM REV CODE 636 W HCPCS: Performed by: STUDENT IN AN ORGANIZED HEALTH CARE EDUCATION/TRAINING PROGRAM

## 2023-04-20 PROCEDURE — 21400001 HC TELEMETRY ROOM

## 2023-04-20 PROCEDURE — 97168 OT RE-EVAL EST PLAN CARE: CPT

## 2023-04-20 PROCEDURE — 25000003 PHARM REV CODE 250: Performed by: INTERNAL MEDICINE

## 2023-04-20 PROCEDURE — 94640 AIRWAY INHALATION TREATMENT: CPT

## 2023-04-20 PROCEDURE — 99233 SBSQ HOSP IP/OBS HIGH 50: CPT | Mod: ,,, | Performed by: INTERNAL MEDICINE

## 2023-04-20 PROCEDURE — 99900035 HC TECH TIME PER 15 MIN (STAT)

## 2023-04-20 PROCEDURE — 99233 PR SUBSEQUENT HOSPITAL CARE,LEVL III: ICD-10-PCS | Mod: ,,, | Performed by: INTERNAL MEDICINE

## 2023-04-20 RX ORDER — HEPARIN SODIUM 5000 [USP'U]/ML
5000 INJECTION, SOLUTION INTRAVENOUS; SUBCUTANEOUS EVERY 8 HOURS
Status: DISCONTINUED | OUTPATIENT
Start: 2023-04-20 | End: 2023-04-30 | Stop reason: HOSPADM

## 2023-04-20 RX ADMIN — ASPIRIN 81 MG CHEWABLE TABLET 81 MG: 81 TABLET CHEWABLE at 09:04

## 2023-04-20 RX ADMIN — IPRATROPIUM BROMIDE AND ALBUTEROL SULFATE 3 ML: 2.5; .5 SOLUTION RESPIRATORY (INHALATION) at 12:04

## 2023-04-20 RX ADMIN — CLOPIDOGREL BISULFATE 75 MG: 75 TABLET ORAL at 09:04

## 2023-04-20 RX ADMIN — Medication 10 ML: at 05:04

## 2023-04-20 RX ADMIN — ATORVASTATIN CALCIUM 80 MG: 40 TABLET, FILM COATED ORAL at 09:04

## 2023-04-20 RX ADMIN — FERROUS SULFATE TAB 325 MG (65 MG ELEMENTAL FE) 1 EACH: 325 (65 FE) TAB at 09:04

## 2023-04-20 RX ADMIN — SENNOSIDES AND DOCUSATE SODIUM 2 TABLET: 50; 8.6 TABLET ORAL at 09:04

## 2023-04-20 RX ADMIN — ACETYLCYSTEINE 4 ML: 100 SOLUTION ORAL; RESPIRATORY (INHALATION) at 04:04

## 2023-04-20 RX ADMIN — MELATONIN TAB 3 MG 9 MG: 3 TAB at 09:04

## 2023-04-20 RX ADMIN — IPRATROPIUM BROMIDE AND ALBUTEROL SULFATE 3 ML: 2.5; .5 SOLUTION RESPIRATORY (INHALATION) at 04:04

## 2023-04-20 RX ADMIN — Medication 10 ML: at 11:04

## 2023-04-20 RX ADMIN — FAMOTIDINE 20 MG: 10 INJECTION, SOLUTION INTRAVENOUS at 09:04

## 2023-04-20 RX ADMIN — HEPARIN SODIUM 5000 UNITS: 5000 INJECTION INTRAVENOUS; SUBCUTANEOUS at 09:04

## 2023-04-20 RX ADMIN — Medication 10 ML: at 12:04

## 2023-04-20 RX ADMIN — ACETYLCYSTEINE 4 ML: 100 SOLUTION ORAL; RESPIRATORY (INHALATION) at 12:04

## 2023-04-20 RX ADMIN — HEPARIN SODIUM 5000 UNITS: 5000 INJECTION INTRAVENOUS; SUBCUTANEOUS at 03:04

## 2023-04-20 RX ADMIN — IPRATROPIUM BROMIDE AND ALBUTEROL SULFATE 3 ML: 2.5; .5 SOLUTION RESPIRATORY (INHALATION) at 08:04

## 2023-04-20 RX ADMIN — CHLORHEXIDINE GLUCONATE 0.12% ORAL RINSE 15 ML: 1.2 LIQUID ORAL at 09:04

## 2023-04-20 RX ADMIN — SULFAMETHOXAZOLE AND TRIMETHOPRIM 1 TABLET: 800; 160 TABLET ORAL at 09:04

## 2023-04-20 RX ADMIN — ACETYLCYSTEINE 4 ML: 100 SOLUTION ORAL; RESPIRATORY (INHALATION) at 08:04

## 2023-04-20 NOTE — ASSESSMENT & PLAN NOTE
-Chronic left effusion dating back as far as 12/2021.    -thoracentesis 4/18 with 700 cc fluid aspirated  -chemistry c/w exudative.  No evidence of infection.  Cytology pending  -diurese with lasix.

## 2023-04-20 NOTE — PROVIDER TRANSFER
72 yo M w squamous CA of tongue s/p glossectomy and reconstructive flap with trach, CAD, chronic hypoxic respiratory failure (on 5L at home) and with peg presented for evaluation of hemoptysis 2 days after tracheostomy change in clinic. Transferred to ICU 3/19 when markedly hypotensive.  Unclear if due to hemorrhage, cardiogenic or septic shock.  Did require PRBC and TXA nebs but didn't seem like sufficient bleeding to cause shock.  Bleeding stopped and ENT following.  CTA shows either significant mucus plugging or bibasilar pneumonia - pulmonology favored pneumonia.  Trach aspirate with Stenotrophomonas, Achromobacter, and Candida. Also urine culture growing Enterococcus.  He is on broad spectrum antibiotics including bactrim. Developed NSTEMI. Cardiology consulted. [facility] 3/23 which showed distal LM 30%, mild LAD 90% bifurcation lesion with D1, Cx mid 80%, RCA moderate diffuse disease with long 70% and some left to right collateral. All vessels heavily calcified. Not a candidate for CABG but plan for staged PCI. Continued on heparin drip, asa/plavix. Vasopressors weaned. [facility] 3/27 PCI to LAD and LCx. Post operatively he was hemodynamically unstable and anemic. Stat CTA showed RP hematoma near L kidney without acute bleeding. Pt transfused supportively. He improved over the next few days. Stepped down to floor. Remains on trach collar, higher than home O2 requirements. He has delirium as well and poor sleep. Attempted trazodone. The following morning, he was much more lethargic and was transferred to ICU for worsening acute on chronic hypoxic/hypercapnic respiratory failure. This improved after 1 day on the vent.      Trach aspirate with GNR; remains on cefepime in addition to bactrim for prior Stenotrophomonas. Improving at 98%, wean O2. Appears Steno and Achro being effectively treated with bactrim, now growing pseudomonas and has been on cefepime, improved. Abx stopped. Pt had thoracentesis with improvement in  breathing, but developed a small ptx. IR consulted for CT placement, but pt refused. No longer requiring ventilatory support at night and PTX improving. Initial plan was for ltac placement, but no long needing vent so does not qualify    -pt/ot reconsulted for placement recommendations  -finishing off bactrim course

## 2023-04-20 NOTE — NURSING
[facility]  ICU Shift Summary  Date: 4/20/2023      Prehospitalization: Home  Admit Date / LOS : 3/18/2023/ 32 days    Diagnosis: Acute on chronic respiratory failure with hypoxia and hypercapnia    Consults:        Active: Pulm CC       Needed: N/A     Code Status: Full Code   Advanced Directive: Not Received    LDA:  Lines/Drains/Airways       Peripherally Inserted Central Catheter Line  Duration             PICC Triple Lumen 03/20/23 2145 right basilic 30 days              Drain  Duration                  Gastrostomy/Enterostomy 01/04/22 Percutaneous endoscopic gastrostomy (PEG)  days    Male External Urinary Catheter 04/12/23 1900 7 days              Airway  Duration             Adult Surgical Airway 03/16/23 1014 Shiley Cuffed 6.0 34 days                  Central Lines/Site/Justification:Unable to Obtain/Maintain PIV  Urinary Cath/Order/Justification:Patient Does Not Have Urinary Catheter    Vasopressors/Infusions:        GOALS: Volume/ Hemodynamic: N/A                     RASS: 0  alert and calm    Pain Management: PO       Pain Controlled: yes     Rhythm: NSR    Respiratory Device: trach collar   Oxygen Concentration (%):  [98] 98                 Most Recent SBT/ SAT: Did not perform       MOVE Screen: PASS  ICU Liberation: yes    VTE Prophylaxis: Pharm  Mobility: Bedrest  Stress Ulcer Prophylaxis: Yes    Isolation: No active isolations    Dietary:   Current Diet Order   Procedures    Diet NPO Except for: Medication, Sips with Medication     Order Specific Question:   Except for     Answer:   Medication     Order Specific Question:   Except for     Answer:   Sips with Medication      Tolerance: yes  Advancement: @ goal    I & O (24h):    Intake/Output Summary (Last 24 hours) at 4/20/2023 0539  Last data filed at 4/20/2023 0527  Gross per 24 hour   Intake 2150 ml   Output 1500 ml   Net 650 ml        Restraints: No    Significant Dates:  Post Op Date: N/A  Rescue Date: N/A  Imaging/  Diagnostics: N/A    Noteworthy Labs:  none    COVID Test: (--)  CBC/Anemia Labs: Coags:    Recent Labs   Lab 04/19/23 0347 04/20/23  0302   WBC 4.99 4.88   HGB 9.3* 8.6*   HCT 29.6* 28.1*    257   MCV 94 95   RDW 17.0* 16.8*    No results for input(s): PT, INR, APTT in the last 168 hours.     Chemistries:   Recent Labs   Lab 04/19/23 0347 04/20/23  0302   * 135*   K 4.8 4.7   CL 97 96   CO2 32* 32*   BUN 23 26*   CREATININE 1.0 0.9   CALCIUM 9.4 8.9   PROT 6.1 5.9*   BILITOT 0.2 0.2   ALKPHOS 114 99   ALT 22 21   AST 30 26        Cardiac Enzymes: Ejection Fractions:    No results for input(s): CPK, CPKMB, MB, TROPONINI in the last 72 hours. EF   Date Value Ref Range Status   03/20/2023 60 % Final        POCT Glucose: HbA1c:    Recent Labs   Lab 04/14/23  2010 04/15/23  0920 04/17/23  1643   POCTGLUCOSE 141* 144* 113*    Hemoglobin A1C   Date Value Ref Range Status   09/27/2022 6.3 (H) 4.0 - 5.6 % Final     Comment:     ADA Screening Guidelines:  5.7-6.4%  Consistent with prediabetes  >or=6.5%  Consistent with diabetes    High levels of fetal hemoglobin interfere with the HbA1C  assay. Heterozygous hemoglobin variants (HbS, HgC, etc)do  not significantly interfere with this assay.   However, presence of multiple variants may affect accuracy.             ICU LOS 8d 20h  Level of Care: OK to Transfer    Chart Check: 24 HR Done  Shift Summary/Plan for the shift: Patient has a productive cough of thick yellow sputum. Lopressor held due to BP.No acute distress noted, safety maintained.  Resting in bed peacefully, side rails up x3 with call light within reach.

## 2023-04-20 NOTE — ASSESSMENT & PLAN NOTE
Oxygen Concentration (%):  [98] 98    - was on trache collar prior to admission.  Recurring hypercapnic respiratory failure requiring continuous PS  - tolerating PS well 24/7  - trache collar since 4/18  - COPD treatment as above  - sputum culture- with pseudomonas  - cxr with chronic left effusion.  pocus 4/18/23 with 3 cm layered effusion.  S/p thoracentesis on 4/18.  -result consistent with exudative.  No evidence of infection.  Cytology pending.      - lasix po daily

## 2023-04-20 NOTE — SUBJECTIVE & OBJECTIVE
Interval History:  Patient feeling better today.  States he no longer is having much shortness of breath.  Discussed plan with patient and wife over the phone.  Patient and wife do not want to    Review of Systems  Objective:     Vital Signs (Most Recent):  Temp: 98.6 °F (37 °C) (04/20/23 1100)  Pulse: 91 (04/20/23 1300)  Resp: (!) 30 (04/20/23 1300)  BP: (!) 102/56 (04/20/23 1300)  SpO2: 100 % (04/20/23 1300)   Vital Signs (24h Range):  Temp:  [98.1 °F (36.7 °C)-98.7 °F (37.1 °C)] 98.6 °F (37 °C)  Pulse:  [] 91  Resp:  [14-30] 30  SpO2:  [91 %-100 %] 100 %  BP: ()/(53-67) 102/56     Weight: 62.9 kg (138 lb 10.7 oz)  Body mass index is 21.08 kg/m².    Intake/Output Summary (Last 24 hours) at 4/20/2023 1326  Last data filed at 4/20/2023 1300  Gross per 24 hour   Intake 2420 ml   Output 1000 ml   Net 1420 ml      Physical Exam  Vitals reviewed.   Constitutional:       General: He is not in acute distress.     Appearance: He is ill-appearing (chronic).   Pulmonary:      Effort: Pulmonary effort is normal. No respiratory distress.   Abdominal:      Palpations: Abdomen is soft.      Tenderness: There is no abdominal tenderness.   Skin:     General: Skin is warm and dry.   Neurological:      General: No focal deficit present.      Mental Status: He is alert.   Psychiatric:         Mood and Affect: Mood normal.         Behavior: Behavior normal.   Significant Labs: All pertinent labs within the past 24 hours have been reviewed.    Significant Imaging: I have reviewed all pertinent imaging results/findings within the past 24 hours.

## 2023-04-20 NOTE — PT/OT/SLP RE-EVAL
Occupational Therapy   Re-evaluation    Name: Fareed Richard Jr.  MRN: [id number]  Admitting Diagnosis:  Acute on chronic respiratory failure with hypoxia and hypercapnia  Recent Surgery: Procedure(s) (LRB):  Angioplasty-coronary (Left)  Aortogram, Aortic Arch  AORTOGRAM, WITH SERIALOGRAPHY (N/A)  Stent, Coronary, Multi Vessel 24 Days Post-Op    Recommendations:     Discharge Recommendations:  (TBD pending ongoing assessment.)  Discharge Equipment Recommendations:  (TBD.)  Barriers to discharge:   (Pt currently in ICU on 10L NC.)    Assessment:     Fareed Richard Jr. is a 73 y.o. male with a medical diagnosis of Acute on chronic respiratory failure with hypoxia and hypercapnia.   Performance deficits affecting function are weakness, impaired endurance, impaired self care skills, impaired functional mobility, gait instability, impaired balance, decreased lower extremity function, decreased safety awareness, impaired cardiopulmonary response to activity.      Pt pleasant and willing to participate in tx session this date; pt appeared calm throughout session as he was able to perform bed mobility w/ SBA to stand and step transfer to chair w/ CGA and use of RW. Pt w/ SPO2 >95% on 10L O2 NC with exertion; pt tolerated tx session well and will continue to benefit from skilled acute OT services to maximize functional capacity for safe performance w/ ADLs and functional mobility.     Rehab Prognosis:  Good ; patient would benefit from acute skilled OT services to address these deficits and reach maximum level of function.       Plan:     Patient to be seen 3 x/week to address the above listed problems via self-care/home management, therapeutic activities, therapeutic exercises  Plan of Care Expires: 04/19/23  Plan of Care Reviewed with: patient    Subjective     Chief Complaint: None stated   Patient/Family stated goals: Asking MD to get out of bed prior to session, per nurse Mauro.   Communicated with: Mark Armas, prior to  session.  Pain/Comfort:  Pain Rating 1: 0/10  Pain Rating Post-Intervention 1: 0/10    Objective:     Communicated with: Nurse prior to session.  Patient found HOB elevated with: telemetry, oxygen, pulse ox (continuous), Tracheostomy, PICC line upon OT entry to room.    General Precautions: Standard, fall, respiratory  Orthopedic Precautions: N/A  Braces: N/A  Respiratory Status:  Trach w/ 10L O2 NC    Occupational Performance:    Bed Mobility:    Patient completed Scooting/Bridging with stand by assistance  Patient completed Supine to Sit with stand by assistance    Functional Mobility/Transfers:  Patient completed Sit <> Stand Transfer with stand by assistance and contact guard assistance  with  rolling walker   Patient completed Bed <> Chair Transfer using Step Transfer technique with stand by assistance and contact guard assistance with rolling walker    Activities of Daily Living:  Upper Body Dressing: minimum assistance for donning gown over back   Toileting: total assistance for donning brief at bed level via rolling/turning in bed     Cognitive/Visual Perceptual:  Cognitive/Psychosocial Skills:     -       Oriented to: Person, Place, Time, and Situation   -       Follows Commands/attention:Follows multistep  commands  -       Communication: clear/fluent  -       Memory: No Deficits noted  -       Safety awareness/insight to disability: impaired     Physical Exam:  Balance:    -       good sitting balance; fair + standing balance  Postural examination/scapula alignment:    -       Rounded shoulders  -       Forward head  Skin integrity: Visible skin intact  Edema:  None noted  Sensation:    -       Intact  Upper Extremity Range of Motion:     -       Right Upper Extremity: WFL  -       Left Upper Extremity: WFL  Upper Extremity Strength:    -       Right Upper Extremity: WFL  -       Left Upper Extremity: WFL   Strength:    -       Right Upper Extremity: WFL  -       Left Upper Extremity: WFL    [facility] 6  Click:  [facility] Total Score: 21    Treatment & Education:  -Re-evaluation complete.   -Activities limited to bed>chair t/f due to x-ray entering room.   -Pt educated on importance of pacing activities and implementing PLB to prevent over-exertion.   -Questions and concerns addressed within scope.     Patient left up in chair with all lines intact, call button in reach, and x-ray techs and nurse present    GOALS:   Multidisciplinary Problems       Occupational Therapy Goals          Problem: Occupational Therapy    Goal Priority Disciplines Outcome Interventions   Occupational Therapy Goal     OT, PT/OT Ongoing, Progressing    Description: Goals to be met by: 4/19/23     Patient will increase functional independence with ADLs by performing:    LE Dressing with Modified [place] and Supervision.  Grooming while standing at sink with Stand-by Assistance and seated rest breaks as needed.   Toileting from toilet with Stand-by Assistance as needed for hygiene and clothing management.   Step transfer with Modified [place].   Toilet transfer to toilet with Modified [place].   Upper extremity exercise program x10 reps per handout, with assistance as needed.  Implementation of PLB and energy conservation strategies for safe performance w/ ADLs and functional mobility.                          History:     Past Medical History:   Diagnosis Date    Cancer     skin to Rt forearm and face    COPD (chronic obstructive pulmonary disease)     Hyperlipidemia     Hypertension     Pseudoaneurysm          Past Surgical History:   Procedure Laterality Date    ABDOMINAL SURGERY      stents placed in liver and large intestines, per patient    ANGIOGRAPHY OF AORTIC ARCH  3/27/2023    Procedure: Aortogram, Aortic Arch;  Surgeon: Tim Bhatt MD;  Location: [facility] CATH LAB;  Service: Cardiology;;    AORTOGRAPHY WITH SERIALOGRAPHY N/A 3/27/2023    Procedure: AORTOGRAM, WITH SERIALOGRAPHY;  Surgeon: Tim Bhatt MD;   Location: [facility] CATH LAB;  Service: Cardiology;  Laterality: N/A;    CAROTID STENT      COLONOSCOPY N/A 09/27/2022    Procedure: COLONOSCOPY;  Surgeon: Donnie Peterson MD;  Location: [facility] ENDO (2ND FLR);  Service: Endoscopy;  Laterality: N/A;    COLONOSCOPY N/A 09/30/2022    Procedure: COLONOSCOPY;  Surgeon: Joy Cabrera MD;  Location: [facility] ENDO (2ND FLR);  Service: Endoscopy;  Laterality: N/A;    CORONARY STENT PLACEMENT  01/2000    DISSECTION OF NECK Bilateral 01/04/2022    Procedure: DISSECTION, NECK;  Surgeon: Naeem Smalls MD;  [facility];  Service: ENT;  Laterality: Bilateral;    ESOPHAGOGASTRODUODENOSCOPY N/A 09/27/2022    Procedure: EGD (ESOPHAGOGASTRODUODENOSCOPY);  Surgeon: Donnie Peterson MD;  Location: [facility] ENDO (2ND FLR);  Service: Endoscopy;  Laterality: N/A;    ESOPHAGOGASTRODUODENOSCOPY N/A 09/30/2022    Procedure: EGD (ESOPHAGOGASTRODUODENOSCOPY);  Surgeon: Joy Cabrera MD;  Location: [facility] (2ND FLR);  Service: Endoscopy;  Laterality: N/A;    ESOPHAGOGASTRODUODENOSCOPY W/ PEG N/A 01/04/2022    Procedure: EGD, WITH PEG TUBE INSERTION;  Surgeon: Anthony Calabrese MD;  [facility];  Service: General;  Laterality: N/A;    EYE SURGERY      Cataract bilateral    femoral stents      bilateral    FLAP PROCEDURE N/A 01/03/2022    Procedure: CREATION, FREE FLAP;  Surgeon: Naeem Smalls MD;  [facility];  Service: ENT;  Laterality: N/A;    FLAP PROCEDURE Right 01/04/2022    Procedure: CREATION, FREE FLAP;  Surgeon: Stacey Conde MD;  [facility];  Service: ENT;  Laterality: Right;  Ischemic start 1203  Ischemic stop 1353    GLOSSECTOMY N/A 01/04/2022    Procedure: GLOSSECTOMY;  Surgeon: Naeem Smalls MD;  [facility];  Service: ENT;  Laterality: N/A;    LEFT HEART CATHETERIZATION Left 3/23/2023    Procedure: Left heart cath;  Surgeon: Tacos Benitez MD;  Location: [facility] CATH LAB;  Service: Cardiology;  Laterality:  Left;    PERCUTANEOUS TRANSLUMINAL BALLOON ANGIOPLASTY OF CORONARY ARTERY Left 3/27/2023    Procedure: Angioplasty-coronary;  Surgeon: Tim Bhatt MD;  Location: [facility] CATH LAB;  Service: Cardiology;  Laterality: Left;  not before 9am, R rad access, 6 Fr EBU 3.5 guide    RADICAL NECK DISSECTION Left 01/03/2022    Procedure: DISSECTION, NECK, RADICAL;  Surgeon: Naeem Smalls MD;  Location: [facility] OR [address] [place] [address];  Service: ENT;  Laterality: Left;    SKIN BIOPSY      SKIN CANCER EXCISION      STENT, CORONARY, MULTI VESSEL  3/27/2023    Procedure: Stent, Coronary, Multi Vessel;  Surgeon: Tim Bhatt MD;  Location: [facility] CATH LAB;  Service: Cardiology;;    TRACHEOTOMY N/A 01/04/2022    Procedure: TRACHEOTOMY;  Surgeon: Naeem Smalls MD;  Location: [facility] OR [address] [place] [address];  Service: ENT;  Laterality: N/A;    VASCULAR SURGERY         Time Tracking:     OT Date of Treatment: 04/20/23  OT Start Time: 1006  OT Stop Time: 1016  OT Total Time (min): 10 min    Billable Minutes:Re-eval 10  Total Time 10    4/20/2023

## 2023-04-20 NOTE — SUBJECTIVE & OBJECTIVE
Interval History: no acute issue.  Off vent support over past 48 hours.  Decline pleural drain for thoracentesis.        Objective:     Vital Signs (Most Recent):  Temp: 98.6 °F (37 °C) (04/20/23 1100)  Pulse: 91 (04/20/23 1100)  Resp: (!) 22 (04/20/23 1100)  BP: (!) 106/55 (04/20/23 1100)  SpO2: 100 % (04/20/23 1100) Vital Signs (24h Range):  Temp:  [98.1 °F (36.7 °C)-98.7 °F (37.1 °C)] 98.6 °F (37 °C)  Pulse:  [] 91  Resp:  [14-27] 22  SpO2:  [88 %-100 %] 100 %  BP: ()/(53-67) 106/55     Weight: 62.9 kg (138 lb 10.7 oz)  Body mass index is 21.08 kg/m².      Intake/Output Summary (Last 24 hours) at 4/20/2023 1209  Last data filed at 4/20/2023 1100  Gross per 24 hour   Intake 2090 ml   Output 1000 ml   Net 1090 ml         Physical Exam  Vitals reviewed.   Constitutional:       Appearance: He is ill-appearing (chronically ill). He is not toxic-appearing.   HENT:      Head: Normocephalic and atraumatic.      Nose: Nose normal. No congestion.      Mouth/Throat:      Mouth: Mucous membranes are moist.      Pharynx: Oropharynx is clear. No oropharyngeal exudate.   Eyes:      Extraocular Movements: Extraocular movements intact.      Conjunctiva/sclera: Conjunctivae normal.      Pupils: Pupils are equal, round, and reactive to light.   Neck:      Comments: Cuffed 6 trach.defated  Cardiovascular:      Rate and Rhythm: Normal rate and regular rhythm.      Pulses: Normal pulses.      Heart sounds: No murmur heard.  Pulmonary:      Effort: Tachypnea present. No respiratory distress.      Breath sounds: No wheezing or rhonchi.   Abdominal:      General: Abdomen is flat. Bowel sounds are normal.      Palpations: Abdomen is soft.   Musculoskeletal:         General: No swelling.   Skin:     General: Skin is warm and dry.      Capillary Refill: Capillary refill takes less than 2 seconds.   Neurological:      General: No focal deficit present.      Mental Status: He is oriented to person, place, and time.     Review of  Systems    Vents:  Vent Mode: PS/CPAP (04/18/23 1708)  Ventilator Initiated: Yes (04/11/23 0936)  Set Rate: 5736.1 BPM (04/18/23 1708)  Vt Set: 400 mL (04/14/23 0821)  Pressure Support: 8 cmH20 (04/18/23 1708)  PEEP/CPAP: 8 cmH20 (04/18/23 1708)  Oxygen Concentration (%): 98 (04/20/23 0817)  Peak Airway Pressure: 17.1 cmH20 (04/18/23 0422)  Total Ve: 8.8 L/m (04/18/23 0422)  F/VT Ratio<105 (RSBI): (!) 86.76 (04/18/23 0422)    Lines/Drains/Airways       Peripherally Inserted Central Catheter Line  Duration             PICC Triple Lumen 03/20/23 2145 right basilic 30 days              Drain  Duration                  Gastrostomy/Enterostomy 01/04/22 Percutaneous endoscopic gastrostomy (PEG)  days    Male External Urinary Catheter 04/12/23 1900 7 days              Airway  Duration             Adult Surgical Airway 03/16/23 1014 Shiley Cuffed 6.0 35 days                    Significant Labs:    CBC/Anemia Profile:  Recent Labs   Lab 04/19/23  0347 04/20/23  0302   WBC 4.99 4.88   HGB 9.3* 8.6*   HCT 29.6* 28.1*    257   MCV 94 95   RDW 17.0* 16.8*          Chemistries:  Recent Labs   Lab 04/19/23  0347 04/20/23  0302   * 135*   K 4.8 4.7   CL 97 96   CO2 32* 32*   BUN 23 26*   CREATININE 1.0 0.9   CALCIUM 9.4 8.9   ALBUMIN 2.0* 1.9*   PROT 6.1 5.9*   BILITOT 0.2 0.2   ALKPHOS 114 99   ALT 22 21   AST 30 26       Thoracentesis  Wbc 315 (s14, l63, m23); amylase 16; glucose of 120; ldh 169; protein 3  Culture ngtd  Cytology pending    All pertinent labs within the past 24 hours have been reviewed.    Significant Imaging:  I have reviewed all pertinent imaging results/findings within the past 24 hours.  CXR: I have reviewed all pertinent results/findings within the past 24 hours and my personal findings are:  4/20/23 persistent left basilar pneumo.  Smaller when compared to 4/19/23

## 2023-04-20 NOTE — NURSING
[facility], [facility]  Nurses Note -- 4 Eyes      4/19/2023       Skin assessed on: Q Shift      [x] No Pressure Injuries Present    [x]Prevention Measures Documented    [] Yes LDA  for Pressure Injury Previously documented     [] Yes New Pressure Injury Discovered   [] LDA for New Pressure Injury Added      Attending RN:  Ivet Blackburn RN     Second RN:  Zunilda Leavitt RN

## 2023-04-20 NOTE — NURSING
[facility], [facility]  Nurses Note -- 4 Eyes      4/20/2023       Skin assessed on: Q Shift      [x] No Pressure Injuries Present    [x]Prevention Measures Documented    [] Yes LDA  for Pressure Injury Previously documented     [] Yes New Pressure Injury Discovered   [] LDA for New Pressure Injury Added      Attending RN:  Zunilda Leavitt RN     Second RN:  Ivet Blackburn RN

## 2023-04-20 NOTE — ASSESSMENT & PLAN NOTE
-trapped lung vs post procedure  -patient decline pleural drain  -repeat cxr with persisting basilar pneumo that is smaller.

## 2023-04-20 NOTE — PLAN OF CARE
Reassessment    Patient arrived from:  home  Discharge plan at this time:  tbd  Original plan was for LTAC for vent weaning.  Patient now on ATC x 3 days.  Thoracentesis was done yesterday with plan for CT but patient refused CT.  Patient will tansfer to tele for continued monitoring.  DC plan will be based on his progress.  María at [facility], Kirsten at [facility] notified and CM notified wife, Bridgett via phone.  Barriers to DC:  none    CM will continue to follow while in the ICU       04/20/23 1017   Discharge Reassessment   Assessment Type Discharge Planning Reassessment   Did the patient's condition or plan change since previous assessment? Yes   Discharge Plan discussed with: Spouse/sig other   Communicated NISA with patient/caregiver Yes   DME Needed Upon Discharge    (tbd)   Discharge Barriers Identified None   Why the patient remains in the hospital Requires continued medical care

## 2023-04-20 NOTE — ASSESSMENT & PLAN NOTE
3/22 Respiratory culture: Stenotrophomonas, Achromobacter--> on bactrim x14 days for this  4/11 Respiratory culture: pseudomonas -> completed cefepime course

## 2023-04-20 NOTE — PROGRESS NOTES
[facility] Medicine  Progress Note    Patient Name: Fareed Richard Jr.  MRN: [id number]  Patient Class: IP- Inpatient   Admission Date: 3/18/2023  Length of Stay: 32 days  Attending Physician: Nikita Castillo III, MD  Primary Care Provider: Kodi Tubbs MD        Subjective:     Principal Problem:Acute on chronic respiratory failure with hypoxia and hypercapnia        HPI:  Fareed Richard Jr. 73 y.o. 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  Per his wife 4 days ago he began to have pink tinged sputum via his trach.  He was seen by his ENT 2 days ago with a scope exam and a trach exchange without evidence of bleeding.  This morning at 3:00 a.m. he developed bright red blood via trach which resolved without intervention.  It is since not recurred.  He takes a daily aspirin.  He receives all medications via G-tube.  His tube feeding regimen consists of 6 cans of Isosource daily with 120 cc free water boluses.  He denies fever chest pain shortness of breath nausea vomiting abdominal pain leg swelling syncope dizziness dysuria melena hematuria hematemesis.    In the ED, hemoglobin 7.6 INR 1.0 BUN 50 creatinine 0.7  troponin negative BRCA negative O positive type and screen chest x-ray without detrimental change hypotensive to 85/54.      Overview/Hospital Course:  72 yo M w squamous CA of tongue s/p glossectomy and reconstructive flap with trach, CAD, chronic hypoxic respiratory failure (on 5L at home) and with peg presented for evaluation of hemoptysis 2 days after tracheostomy change in clinic. Transferred to ICU 3/19 when markedly hypotensive.  Unclear if due to hemorrhage, cardiogenic or septic shock.  Did require PRBC and TXA nebs but didn't seem like sufficient bleeding to cause shock.  Bleeding stopped and ENT following.  CTA shows either significant mucus  plugging or bibasilar pneumonia - pulmonology favored pneumonia.  Trach aspirate with Stenotrophomonas, Achromobacter, and Candida. Also urine culture growing Enterococcus.  He is on broad spectrum antibiotics including bactrim. Developed NSTEMI. Cardiology consulted. [facility] 3/23 which showed distal LM 30%, mild LAD 90% bifurcation lesion with D1, Cx mid 80%, RCA moderate diffuse disease with long 70% and some left to right collateral. All vessels heavily calcified. Not a candidate for CABG but plan for staged PCI. Continued on heparin drip, asa/plavix. Vasopressors weaned. [facility] 3/27 PCI to LAD and LCx. Post operatively he was hemodynamically unstable and anemic. Stat CTA showed RP hematoma near L kidney without acute bleeding. Pt transfused supportively. He improved over the next few days. Stepped down to floor. Remains on trach collar, higher than home O2 requirements. He has delirium as well and poor sleep. Attempted trazodone. The following morning, he was much more lethargic and was transferred to ICU for worsening acute on chronic hypoxic/hypercapnic respiratory failure. This improved after 1 day on the vent.     Trach aspirate with GNR; remains on cefepime in addition to bactrim for prior Stenotrophomonas. Improving at 98%, wean O2. Appears Steno and Achro being effectively treated with bactrim, now growing pseudomonas and has been on cefepime, improving. Per ICU discussion, wife now more understanding he needs LTAC placement.       Interval History:  Patient feeling better today.  States he no longer is having much shortness of breath.  Discussed plan with patient and wife over the phone.  Patient and wife do not want to    Review of Systems  Objective:     Vital Signs (Most Recent):  Temp: 98.6 °F (37 °C) (04/20/23 1100)  Pulse: 91 (04/20/23 1300)  Resp: (!) 30 (04/20/23 1300)  BP: (!) 102/56 (04/20/23 1300)  SpO2: 100 % (04/20/23 1300)   Vital Signs (24h Range):  Temp:  [98.1 °F (36.7 °C)-98.7 °F (37.1 °C)]  98.6 °F (37 °C)  Pulse:  [] 91  Resp:  [14-30] 30  SpO2:  [91 %-100 %] 100 %  BP: ()/(53-67) 102/56     Weight: 62.9 kg (138 lb 10.7 oz)  Body mass index is 21.08 kg/m².    Intake/Output Summary (Last 24 hours) at 4/20/2023 1326  Last data filed at 4/20/2023 1300  Gross per 24 hour   Intake 2420 ml   Output 1000 ml   Net 1420 ml      Physical Exam  Vitals reviewed.   Constitutional:       General: He is not in acute distress.     Appearance: He is ill-appearing (chronic).   Pulmonary:      Effort: Pulmonary effort is normal. No respiratory distress.   Abdominal:      Palpations: Abdomen is soft.      Tenderness: There is no abdominal tenderness.   Skin:     General: Skin is warm and dry.   Neurological:      General: No focal deficit present.      Mental Status: He is alert.   Psychiatric:         Mood and Affect: Mood normal.         Behavior: Behavior normal.   Significant Labs: All pertinent labs within the past 24 hours have been reviewed.    Significant Imaging: I have reviewed all pertinent imaging results/findings within the past 24 hours.      Assessment/Plan:      * Acute on chronic respiratory failure with hypoxia and hypercapnia  At home is on 5L O2 via trach and O2 sats typically in high 80s low 90s. Worsening respiration this admission due to aspiration of blood and likely food aspiration. Required ventilator from 3/21 to 3/23, subsequently weaned. Treated empirically for pneumonia. Respiratory cultures show Stenotrophomonas which was not treated  - started bactrim to treat Stenotrophomonas  - worsening hypoxia and hypercapnia on 4/11. This could be related to trazodone use (attempted 4/10 PM for sleep)   - transferred to ICU and placed on ventilator   - treating potential pneumonia. Known Stenotrophomonas- treating with bactrim. Trach aspirate with GNR- tx with cefepime completed    - does not appear that he has had another MI   - significantly improved. No longer requiring ventilatory  "support even HS   - Pulmonary is following       Postprocedural pneumothorax        Pleural effusion  Pulmonology consulted who recommended thoracentesis. Performed 4/18 by IR. Pt subsequently developed persistent ptx, but respiratory wise no significant deterioration     -ir consulted for Chest tube eval, but pt refused. PTX seems to be improving.      Retroperitoneal hematoma  RP hematoma discovered after LHC. See "acute blood loss anemia"      NSTEMI (non-ST elevated myocardial infarction)  See "coronary artery disease"      Anxiety  Anxiety waxes and wanes with clinical status. He also has delirium  - Stopped hydroxyzine as this could be contributing to delirium   - Prior bad reaction to ativan  - Attempted trazodone to help sleep and then had worsening encephalopathy and hypoxic/hypercapnic respiratory failure the next morning  - may try mirtazapine if this becomes an issue    Hemoptysis  -See acute blood loss anemia  -this has resolved     Pneumonia  3/22 Respiratory culture: Stenotrophomonas, Achromobacter--> on bactrim x14 days for this  4/11 Respiratory culture: pseudomonas -> completed cefepime course    PEG (percutaneous endoscopic gastrostomy) status  Continue medications via PEG. Does not take oral intake.   -On isosource 1.5 6 days daily with 120cc free water flushes via wife  -Will continue home tube feedings, with nutrition consulted    Tracheostomy present  See respiratory failure     ACP (advance care planning)  Advance Care Planning     Date: 04/10/2023  Palliative consulted, reviewed documentation. Full code. Needs home palliative follow up upon discharge. Time spent reviewing on 4/10/23= 5 minutes          Severe protein-calorie malnutrition  RD following. On tube feeds.      Secondary malignant neoplasm of cervical lymph node  Noted      Acute blood loss anemia  Pt presented with hemoptysis. Scopes prior to admit and by ENT during admit not showing clear source. Bleeding improved. Pt underwent " [facility] for NSTEMI. After that procedure he was found to have a large retroperitoneal hematoma. Since starting DAPT he has also had recurrent hemoptysis as well as melanic stools. Trach cuff reinflated 4/2 with improvement in bleeding.  - trend CBC, transfuse prn  - Hgb now stable  - continue asa, plavix     COPD exacerbation  This seems resolved; however, did develop worsening hypoxic/hypercapnic respiratory failure  - coninue nebs      Other hyperlipidemia  -Continue home statin    Primary hypertension  Previously hypotensive, now normotensive  - continue metoprolol     Coronary artery disease involving native coronary artery of native heart with unstable angina pectoris  Troponin elevated when pt was upgraded to the ICU. At that time it was thought to be due to demand, however he had persistent chest pain concerning for ACS. Was started on heparin gtt and tolerated. Subsequently went to [facility] which showed diffuse disease. Pt not a candidate for CABG. He subsequently underwent PCI of LAD and LCx.  - Continue asa, plavix, statin    Squamous cell cancer of tongue  Dx 2021, now s/p total glossectomy, bilateral neck dissection, bilateral cervical facial flaps and anterolateral thigh flap reconstruction of glossectomy defect 1/4/22. Completed adjuvant chemoradiation. Had trach changed by ENT 2 days prior to admit and scope at that time showed no signs of bleeding despite hemoptysis present on admission.     Carotid stenosis  Continues on asa, plavix, statin       Peripheral vascular disease  Continues on asa, plavix, statin         VTE Risk Mitigation (From admission, onward)         Ordered     heparin (porcine) injection 5,000 Units  Every 8 hours         04/20/23 1325     IP VTE LOW RISK PATIENT  Once         04/11/23 0909     Place sequential compression device  Until discontinued         03/18/23 1620     Place NIKITA hose  Until discontinued         03/18/23 1620                Discharge Planning   NISA:      Code Status:  Full Code   Is the patient medically ready for discharge?:     Reason for patient still in hospital (select all that apply): Treatment and Consult recommendations  Discharge Plan A: Long-term acute care facility (LTAC)   Discharge Delays: None known at this time            Nikita Castillo III, MD  Department of Hospital Medicine   [facility] - Intensive Care

## 2023-04-20 NOTE — PLAN OF CARE
Problem: Occupational Therapy  Goal: Occupational Therapy Goal  Description: Goals to be met by: 4/19/23     Patient will increase functional independence with ADLs by performing:    LE Dressing with Modified [place] and Supervision.  Grooming while standing at sink with Stand-by Assistance and seated rest breaks as needed.   Toileting from toilet with Stand-by Assistance as needed for hygiene and clothing management.   Step transfer with Modified [place].   Toilet transfer to toilet with Modified [place].   Upper extremity exercise program x10 reps per handout, with assistance as needed.  Implementation of PLB and energy conservation strategies for safe performance w/ ADLs and functional mobility.     Outcome: Ongoing, Progressing

## 2023-04-20 NOTE — ASSESSMENT & PLAN NOTE
Pulmonology consulted who recommended thoracentesis. Performed 4/18 by IR. Pt subsequently developed persistent ptx, but respiratory wise no significant deterioration     -ir consulted for Chest tube eval, but pt refused. PTX seems to be improving.

## 2023-04-20 NOTE — ASSESSMENT & PLAN NOTE
At home is on 5L O2 via trach and O2 sats typically in high 80s low 90s. Worsening respiration this admission due to aspiration of blood and likely food aspiration. Required ventilator from 3/21 to 3/23, subsequently weaned. Treated empirically for pneumonia. Respiratory cultures show Stenotrophomonas which was not treated  - started bactrim to treat Stenotrophomonas  - worsening hypoxia and hypercapnia on 4/11. This could be related to trazodone use (attempted 4/10 PM for sleep)   - transferred to ICU and placed on ventilator   - treating potential pneumonia. Known Stenotrophomonas- treating with bactrim. Trach aspirate with GNR- tx with cefepime completed    - does not appear that he has had another MI   - significantly improved. No longer requiring ventilatory support even HS   - Pulmonary is following

## 2023-04-20 NOTE — ASSESSMENT & PLAN NOTE
Troponin elevated when pt was upgraded to the ICU. At that time it was thought to be due to demand, however he had persistent chest pain concerning for ACS. Was started on heparin gtt and tolerated. Subsequently went to [facility] which showed diffuse disease. Pt not a candidate for CABG. He subsequently underwent PCI of LAD and LCx.  - Continue asa, plavix, statin

## 2023-04-20 NOTE — PROGRESS NOTES
[facility]  Pulmonology  Progress Note    Patient Name: Fareed Richard Jr.  MRN: [id number]  Admission Date: 3/18/2023  Hospital Length of Stay: 32 days  Code Status: Full Code  Attending Provider: Nikita Castillo III, MD  Primary Care Provider: Kodi Tubbs MD   Principal Problem: Acute on chronic respiratory failure with hypoxia and hypercapnia    Subjective:     Interval History: no acute issue.  Off vent support over past 48 hours.  Decline pleural drain for thoracentesis.        Objective:     Vital Signs (Most Recent):  Temp: 98.6 °F (37 °C) (04/20/23 1100)  Pulse: 91 (04/20/23 1100)  Resp: (!) 22 (04/20/23 1100)  BP: (!) 106/55 (04/20/23 1100)  SpO2: 100 % (04/20/23 1100) Vital Signs (24h Range):  Temp:  [98.1 °F (36.7 °C)-98.7 °F (37.1 °C)] 98.6 °F (37 °C)  Pulse:  [] 91  Resp:  [14-27] 22  SpO2:  [88 %-100 %] 100 %  BP: ()/(53-67) 106/55     Weight: 62.9 kg (138 lb 10.7 oz)  Body mass index is 21.08 kg/m².      Intake/Output Summary (Last 24 hours) at 4/20/2023 1209  Last data filed at 4/20/2023 1100  Gross per 24 hour   Intake 2090 ml   Output 1000 ml   Net 1090 ml         Physical Exam  Vitals reviewed.   Constitutional:       Appearance: He is ill-appearing (chronically ill). He is not toxic-appearing.   HENT:      Head: Normocephalic and atraumatic.      Nose: Nose normal. No congestion.      Mouth/Throat:      Mouth: Mucous membranes are moist.      Pharynx: Oropharynx is clear. No oropharyngeal exudate.   Eyes:      Extraocular Movements: Extraocular movements intact.      Conjunctiva/sclera: Conjunctivae normal.      Pupils: Pupils are equal, round, and reactive to light.   Neck:      Comments: Cuffed 6 trach.defated  Cardiovascular:      Rate and Rhythm: Normal rate and regular rhythm.      Pulses: Normal pulses.      Heart sounds: No murmur heard.  Pulmonary:      Effort: Tachypnea present. No respiratory distress.      Breath sounds: No wheezing or rhonchi.    Abdominal:      General: Abdomen is flat. Bowel sounds are normal.      Palpations: Abdomen is soft.   Musculoskeletal:         General: No swelling.   Skin:     General: Skin is warm and dry.      Capillary Refill: Capillary refill takes less than 2 seconds.   Neurological:      General: No focal deficit present.      Mental Status: He is oriented to person, place, and time.     Review of Systems    Vents:  Vent Mode: PS/CPAP (04/18/23 1708)  Ventilator Initiated: Yes (04/11/23 0936)  Set Rate: 5736.1 BPM (04/18/23 1708)  Vt Set: 400 mL (04/14/23 0821)  Pressure Support: 8 cmH20 (04/18/23 1708)  PEEP/CPAP: 8 cmH20 (04/18/23 1708)  Oxygen Concentration (%): 98 (04/20/23 0817)  Peak Airway Pressure: 17.1 cmH20 (04/18/23 0422)  Total Ve: 8.8 L/m (04/18/23 0422)  F/VT Ratio<105 (RSBI): (!) 86.76 (04/18/23 0422)    Lines/Drains/Airways       Peripherally Inserted Central Catheter Line  Duration             PICC Triple Lumen 03/20/23 2145 right basilic 30 days              Drain  Duration                  Gastrostomy/Enterostomy 01/04/22 Percutaneous endoscopic gastrostomy (PEG)  days    Male External Urinary Catheter 04/12/23 1900 7 days              Airway  Duration             Adult Surgical Airway 03/16/23 1014 Shiley Cuffed 6.0 35 days                    Significant Labs:    CBC/Anemia Profile:  Recent Labs   Lab 04/19/23 0347 04/20/23  0302   WBC 4.99 4.88   HGB 9.3* 8.6*   HCT 29.6* 28.1*    257   MCV 94 95   RDW 17.0* 16.8*          Chemistries:  Recent Labs   Lab 04/19/23  0347 04/20/23  0302   * 135*   K 4.8 4.7   CL 97 96   CO2 32* 32*   BUN 23 26*   CREATININE 1.0 0.9   CALCIUM 9.4 8.9   ALBUMIN 2.0* 1.9*   PROT 6.1 5.9*   BILITOT 0.2 0.2   ALKPHOS 114 99   ALT 22 21   AST 30 26       Thoracentesis  Wbc 315 (s14, l63, m23); amylase 16; glucose of 120; ldh 169; protein 3  Culture ngtd  Cytology pending    All pertinent labs within the past 24 hours have been reviewed.    Significant  Imaging:  I have reviewed all pertinent imaging results/findings within the past 24 hours.  CXR: I have reviewed all pertinent results/findings within the past 24 hours and my personal findings are:  4/20/23 persistent left basilar pneumo.  Smaller when compared to 4/19/23      ABG  No results for input(s): PH, PO2, PCO2, HCO3, BE in the last 168 hours.  Assessment/Plan:     ENT  Tracheostomy present  -Tracheostomy in place since surgical resection for head/neck cancer in January 2022.  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  -cuffed deflated since off vent.    -doing well on trache collar    Pulmonary  * Acute on chronic respiratory failure with hypoxia and hypercapnia  Oxygen Concentration (%):  [98] 98    - was on trache collar prior to admission.  Recurring hypercapnic respiratory failure requiring continuous PS  - tolerating PS well 24/7  - trache collar since 4/18  - COPD treatment as above  - sputum culture- with pseudomonas  - cxr with chronic left effusion.  pocus 4/18/23 with 3 cm layered effusion.  S/p thoracentesis on 4/18.  -result consistent with exudative.  No evidence of infection.  Cytology pending.      - lasix po daily    Postprocedural pneumothorax  -trapped lung vs post procedure  -patient decline pleural drain  -repeat cxr with persisting basilar pneumo that is smaller.      Pleural effusion  -Chronic left effusion dating back as far as 12/2021.    -thoracentesis 4/18 with 700 cc fluid aspirated  -chemistry c/w exudative.  No evidence of infection.  Cytology pending  -diurese with lasix.        Hemoptysis  Likely secondary to lower respiratory tract infection in the setting of ASA/Plavix.  It seems less likely to be physical complication of the tracheostomy tube itself.    · Antibiotics for pneumonia.  · Check sputum for culture, including AFB for possible MAC infection.  · Respiratory viral panel.  · OK to  resume anti-platelet medications; monitor for bleeding   · No need for additional nebulized TXA.  No bleeding since 3/19      COPD exacerbation  Continue duonebs but not suspecting this as the primary  at this time.  - thick secretions have been an issue, adding cpt and mucomyst nebs to assist with thinning secretions for clearance.   - follow up sputum culture with pan sensitive pseudomonas.    -recommend stopping cefapime since patient already completed 7 days course.    -stenotrophomonas on bactrim since 4/10.  Recommend 10-14 days of therapy           Alexanedr Mcmanus MD  Pulmonology  [facility] - [facility]

## 2023-04-21 LAB
ALBUMIN SERPL BCP-MCNC: 1.9 G/DL (ref 3.5–5.2)
ALP SERPL-CCNC: 91 U/L (ref 55–135)
ALT SERPL W/O P-5'-P-CCNC: 19 U/L (ref 10–44)
ANION GAP SERPL CALC-SCNC: 7 MMOL/L (ref 8–16)
AST SERPL-CCNC: 25 U/L (ref 10–40)
BASOPHILS # BLD AUTO: 0.02 K/UL (ref 0–0.2)
BASOPHILS NFR BLD: 0.4 % (ref 0–1.9)
BILIRUB SERPL-MCNC: 0.2 MG/DL (ref 0.1–1)
BUN SERPL-MCNC: 23 MG/DL (ref 8–23)
CALCIUM SERPL-MCNC: 8.7 MG/DL (ref 8.7–10.5)
CHLORIDE SERPL-SCNC: 95 MMOL/L (ref 95–110)
CO2 SERPL-SCNC: 32 MMOL/L (ref 23–29)
CREAT SERPL-MCNC: 0.8 MG/DL (ref 0.5–1.4)
DIFFERENTIAL METHOD: ABNORMAL
EOSINOPHIL # BLD AUTO: 0.3 K/UL (ref 0–0.5)
EOSINOPHIL NFR BLD: 5.2 % (ref 0–8)
ERYTHROCYTE [DISTWIDTH] IN BLOOD BY AUTOMATED COUNT: 16.3 % (ref 11.5–14.5)
EST. GFR  (NO RACE VARIABLE): >60 ML/MIN/1.73 M^2
GLUCOSE SERPL-MCNC: 120 MG/DL (ref 70–110)
HCT VFR BLD AUTO: 26.3 % (ref 40–54)
HGB BLD-MCNC: 8.2 G/DL (ref 14–18)
IMM GRANULOCYTES # BLD AUTO: 0.03 K/UL (ref 0–0.04)
IMM GRANULOCYTES NFR BLD AUTO: 0.6 % (ref 0–0.5)
LYMPHOCYTES # BLD AUTO: 0.4 K/UL (ref 1–4.8)
LYMPHOCYTES NFR BLD: 8.1 % (ref 18–48)
MCH RBC QN AUTO: 29.3 PG (ref 27–31)
MCHC RBC AUTO-ENTMCNC: 31.2 G/DL (ref 32–36)
MCV RBC AUTO: 94 FL (ref 82–98)
MONOCYTES # BLD AUTO: 0.7 K/UL (ref 0.3–1)
MONOCYTES NFR BLD: 12.5 % (ref 4–15)
NEUTROPHILS # BLD AUTO: 4 K/UL (ref 1.8–7.7)
NEUTROPHILS NFR BLD: 73.2 % (ref 38–73)
NRBC BLD-RTO: 0 /100 WBC
PLATELET # BLD AUTO: 247 K/UL (ref 150–450)
PMV BLD AUTO: 9.7 FL (ref 9.2–12.9)
POTASSIUM SERPL-SCNC: 5.1 MMOL/L (ref 3.5–5.1)
PROT SERPL-MCNC: 5.8 G/DL (ref 6–8.4)
RBC # BLD AUTO: 2.8 M/UL (ref 4.6–6.2)
SODIUM SERPL-SCNC: 134 MMOL/L (ref 136–145)
WBC # BLD AUTO: 5.43 K/UL (ref 3.9–12.7)

## 2023-04-21 PROCEDURE — 99233 SBSQ HOSP IP/OBS HIGH 50: CPT | Mod: ,,, | Performed by: INTERNAL MEDICINE

## 2023-04-21 PROCEDURE — 97164 PT RE-EVAL EST PLAN CARE: CPT

## 2023-04-21 PROCEDURE — 97110 THERAPEUTIC EXERCISES: CPT

## 2023-04-21 PROCEDURE — 21400001 HC TELEMETRY ROOM

## 2023-04-21 PROCEDURE — 99233 PR SUBSEQUENT HOSPITAL CARE,LEVL III: ICD-10-PCS | Mod: ,,, | Performed by: INTERNAL MEDICINE

## 2023-04-21 PROCEDURE — 94761 N-INVAS EAR/PLS OXIMETRY MLT: CPT

## 2023-04-21 PROCEDURE — 25000003 PHARM REV CODE 250: Performed by: INTERNAL MEDICINE

## 2023-04-21 PROCEDURE — 80053 COMPREHEN METABOLIC PANEL: CPT | Performed by: HOSPITALIST

## 2023-04-21 PROCEDURE — 99900035 HC TECH TIME PER 15 MIN (STAT)

## 2023-04-21 PROCEDURE — 25000003 PHARM REV CODE 250: Performed by: HOSPITALIST

## 2023-04-21 PROCEDURE — 25000242 PHARM REV CODE 250 ALT 637 W/ HCPCS: Performed by: INTERNAL MEDICINE

## 2023-04-21 PROCEDURE — 36415 COLL VENOUS BLD VENIPUNCTURE: CPT | Performed by: HOSPITALIST

## 2023-04-21 PROCEDURE — 25000242 PHARM REV CODE 250 ALT 637 W/ HCPCS: Performed by: HOSPITALIST

## 2023-04-21 PROCEDURE — A4216 STERILE WATER/SALINE, 10 ML: HCPCS | Performed by: HOSPITALIST

## 2023-04-21 PROCEDURE — 99900026 HC AIRWAY MAINTENANCE (STAT)

## 2023-04-21 PROCEDURE — 85025 COMPLETE CBC W/AUTO DIFF WBC: CPT | Performed by: HOSPITALIST

## 2023-04-21 PROCEDURE — 94640 AIRWAY INHALATION TREATMENT: CPT

## 2023-04-21 PROCEDURE — 27000221 HC OXYGEN, UP TO 24 HOURS

## 2023-04-21 PROCEDURE — 63600175 PHARM REV CODE 636 W HCPCS: Performed by: STUDENT IN AN ORGANIZED HEALTH CARE EDUCATION/TRAINING PROGRAM

## 2023-04-21 RX ORDER — FUROSEMIDE 40 MG/1
40 TABLET ORAL DAILY
Status: DISCONTINUED | OUTPATIENT
Start: 2023-04-21 | End: 2023-04-25

## 2023-04-21 RX ADMIN — FAMOTIDINE 20 MG: 10 INJECTION, SOLUTION INTRAVENOUS at 09:04

## 2023-04-21 RX ADMIN — CHLORHEXIDINE GLUCONATE 0.12% ORAL RINSE 15 ML: 1.2 LIQUID ORAL at 09:04

## 2023-04-21 RX ADMIN — IPRATROPIUM BROMIDE AND ALBUTEROL SULFATE 3 ML: 2.5; .5 SOLUTION RESPIRATORY (INHALATION) at 08:04

## 2023-04-21 RX ADMIN — FERROUS SULFATE TAB 325 MG (65 MG ELEMENTAL FE) 1 EACH: 325 (65 FE) TAB at 09:04

## 2023-04-21 RX ADMIN — SENNOSIDES AND DOCUSATE SODIUM 2 TABLET: 50; 8.6 TABLET ORAL at 09:04

## 2023-04-21 RX ADMIN — ASPIRIN 81 MG CHEWABLE TABLET 81 MG: 81 TABLET CHEWABLE at 09:04

## 2023-04-21 RX ADMIN — SULFAMETHOXAZOLE AND TRIMETHOPRIM 1 TABLET: 800; 160 TABLET ORAL at 09:04

## 2023-04-21 RX ADMIN — Medication 10 ML: at 05:04

## 2023-04-21 RX ADMIN — IPRATROPIUM BROMIDE AND ALBUTEROL SULFATE 3 ML: 2.5; .5 SOLUTION RESPIRATORY (INHALATION) at 04:04

## 2023-04-21 RX ADMIN — ATORVASTATIN CALCIUM 80 MG: 40 TABLET, FILM COATED ORAL at 09:04

## 2023-04-21 RX ADMIN — ACETYLCYSTEINE 4 ML: 100 SOLUTION ORAL; RESPIRATORY (INHALATION) at 08:04

## 2023-04-21 RX ADMIN — IPRATROPIUM BROMIDE AND ALBUTEROL SULFATE 3 ML: 2.5; .5 SOLUTION RESPIRATORY (INHALATION) at 12:04

## 2023-04-21 RX ADMIN — HEPARIN SODIUM 5000 UNITS: 5000 INJECTION INTRAVENOUS; SUBCUTANEOUS at 02:04

## 2023-04-21 RX ADMIN — ACETYLCYSTEINE 4 ML: 100 SOLUTION ORAL; RESPIRATORY (INHALATION) at 04:04

## 2023-04-21 RX ADMIN — HEPARIN SODIUM 5000 UNITS: 5000 INJECTION INTRAVENOUS; SUBCUTANEOUS at 06:04

## 2023-04-21 RX ADMIN — Medication 10 ML: at 12:04

## 2023-04-21 RX ADMIN — CLOPIDOGREL BISULFATE 75 MG: 75 TABLET ORAL at 09:04

## 2023-04-21 RX ADMIN — ACETYLCYSTEINE 4 ML: 100 SOLUTION ORAL; RESPIRATORY (INHALATION) at 12:04

## 2023-04-21 RX ADMIN — HEPARIN SODIUM 5000 UNITS: 5000 INJECTION INTRAVENOUS; SUBCUTANEOUS at 09:04

## 2023-04-21 RX ADMIN — MELATONIN TAB 3 MG 9 MG: 3 TAB at 09:04

## 2023-04-21 RX ADMIN — FUROSEMIDE 40 MG: 40 TABLET ORAL at 12:04

## 2023-04-21 RX ADMIN — METOPROLOL TARTRATE 25 MG: 25 TABLET, FILM COATED ORAL at 09:04

## 2023-04-21 NOTE — PROGRESS NOTES
[facility]  Pulmonology  Progress Note    Patient Name: Fareed Richard Jr.  MRN: [id number]  Admission Date: 3/18/2023  Hospital Length of Stay: 33 days  Code Status: Full Code  Attending Provider: Kelby Sargent MD  Primary Care Provider: Kodi Tubbs MD   Principal Problem: Acute on chronic respiratory failure with hypoxia and hypercapnia    Subjective:     Interval History: no acute issue.  Off vent support since 4/18/23.  Oob with pt.        Objective:     Vital Signs (Most Recent):  Temp: 97.8 °F (36.6 °C) (04/21/23 0701)  Pulse: 100 (04/21/23 0900)  Resp: (!) 34 (04/21/23 0830)  BP: 107/60 (04/21/23 0900)  SpO2: 99 % (04/21/23 0830) Vital Signs (24h Range):  Temp:  [97.8 °F (36.6 °C)-98.6 °F (37 °C)] 97.8 °F (36.6 °C)  Pulse:  [] 100  Resp:  [15-56] 34  SpO2:  [85 %-100 %] 99 %  BP: ()/(55-70) 107/60     Weight: 62.9 kg (138 lb 10.7 oz)  Body mass index is 21.08 kg/m².      Intake/Output Summary (Last 24 hours) at 4/21/2023 0955  Last data filed at 4/21/2023 0600  Gross per 24 hour   Intake 1840 ml   Output 1250 ml   Net 590 ml         Physical Exam  Vitals reviewed.   Constitutional:       Appearance: He is ill-appearing (chronically ill). He is not toxic-appearing.   HENT:      Head: Normocephalic and atraumatic.      Nose: Nose normal. No congestion.      Mouth/Throat:      Mouth: Mucous membranes are moist.      Pharynx: Oropharynx is clear. No oropharyngeal exudate.   Eyes:      Extraocular Movements: Extraocular movements intact.      Conjunctiva/sclera: Conjunctivae normal.      Pupils: Pupils are equal, round, and reactive to light.   Neck:      Comments: Cuffed 6 trach.defated  Cardiovascular:      Rate and Rhythm: Normal rate and regular rhythm.      Pulses: Normal pulses.      Heart sounds: No murmur heard.  Pulmonary:      Effort: Tachypnea present. No respiratory distress.      Breath sounds: No wheezing or rhonchi.   Abdominal:      General: Abdomen is flat. Bowel  sounds are normal.      Palpations: Abdomen is soft.   Musculoskeletal:         General: No swelling.   Skin:     General: Skin is warm and dry.      Capillary Refill: Capillary refill takes less than 2 seconds.   Neurological:      General: No focal deficit present.      Mental Status: He is oriented to person, place, and time.     Review of Systems    Vents:  Vent Mode: PS/CPAP (04/18/23 1708)  Ventilator Initiated: Yes (04/11/23 0936)  Set Rate: 5736.1 BPM (04/18/23 1708)  Vt Set: 400 mL (04/14/23 0821)  Pressure Support: 8 cmH20 (04/18/23 1708)  PEEP/CPAP: 8 cmH20 (04/18/23 1708)  Oxygen Concentration (%): 60 (04/21/23 0812)  Peak Airway Pressure: 17.1 cmH20 (04/18/23 0422)  Total Ve: 8.8 L/m (04/18/23 0422)  F/VT Ratio<105 (RSBI): (!) 86.76 (04/18/23 0422)    Lines/Drains/Airways       Peripherally Inserted Central Catheter Line  Duration             PICC Triple Lumen 03/20/23 2145 right basilic 31 days              Drain  Duration                  Gastrostomy/Enterostomy 01/04/22 Percutaneous endoscopic gastrostomy (PEG)  days    Male External Urinary Catheter 04/12/23 1900 8 days              Airway  Duration             Adult Surgical Airway 03/16/23 1014 Shiley Cuffed 6.0 35 days                    Significant Labs:    CBC/Anemia Profile:  Recent Labs   Lab 04/20/23  0302 04/21/23 0313   WBC 4.88 5.43   HGB 8.6* 8.2*   HCT 28.1* 26.3*    247   MCV 95 94   RDW 16.8* 16.3*          Chemistries:  Recent Labs   Lab 04/20/23  0302 04/21/23  0313   * 134*   K 4.7 5.1   CL 96 95   CO2 32* 32*   BUN 26* 23   CREATININE 0.9 0.8   CALCIUM 8.9 8.7   ALBUMIN 1.9* 1.9*   PROT 5.9* 5.8*   BILITOT 0.2 0.2   ALKPHOS 99 91   ALT 21 19   AST 26 25       Thoracentesis  Wbc 315 (s14, l63, m23); amylase 16; glucose of 120; ldh 169; protein 3  Culture ngtd  Cytology pending    All pertinent labs within the past 24 hours have been reviewed.    Significant Imaging:  I have reviewed all pertinent imaging  results/findings within the past 24 hours.  CXR: I have reviewed all pertinent results/findings within the past 24 hours and my personal findings are:  4/21/23 persistent left basilar pneumo.  +reaccumulation of left effusion      ABG  No results for input(s): PH, PO2, PCO2, HCO3, BE in the last 168 hours.  Assessment/Plan:     ENT  Tracheostomy present  -Tracheostomy in place since surgical resection for head/neck cancer in January 2022.  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  -cuffed deflated since off vent.    -doing well on trache collar    Pulmonary  * Acute on chronic respiratory failure with hypoxia and hypercapnia  Oxygen Concentration (%):  [60-98] 60    - was on trache collar prior to admission.  Recurring hypercapnic respiratory failure requiring continuous PS  - tolerating PS well 24/7  - trache collar since 4/18  - COPD treatment as above  - sputum culture- with pseudomonas  - cxr with chronic left effusion.  pocus 4/18/23 with 3 cm layered effusion.  S/p thoracentesis on 4/18.  -result consistent with exudative.  No evidence of infection.  Cytology pending.      -bnp 1100 on 4/18.  Recommend resumption of lasix po daily    Postprocedural pneumothorax  -trapped lung + post procedure  -patient decline pleural drain  -repeat cxr with persisting basilar pneumo that is smaller than post thoracentesis film.  I suspect residual pneumo is from trapped lung physiology and will persist until fluid fully reaccumulate.  .      Pleural effusion  -Chronic left effusion dating back as far as 12/2021.    -thoracentesis 4/18 with 700 cc fluid aspirated  -chemistry borderline exudate/transudate.  No evidence of infection.  Cytology pending  -diurese with lasix.        Hemoptysis  Likely secondary to lower respiratory tract infection in the setting of ASA/Plavix.  It seems less likely to be physical complication of the tracheostomy  tube itself.    Antibiotics for pneumonia.  Check sputum for culture, including AFB for possible MAC infection.  Respiratory viral panel.  OK to resume anti-platelet medications; monitor for bleeding   No need for additional nebulized TXA.  No bleeding since 3/19      COPD exacerbation  Continue duonebs but not suspecting this as the primary  at this time.  - thick secretions have been an issue, adding cpt and mucomyst nebs to assist with thinning secretions for clearance.   - follow up sputum culture with pan sensitive pseudomonas.  S/p 7 days of cefapime.    -stenotrophomonas on bactrim since 4/10.  Recommend 10-14 days of therapy    Cardiac/Vascular  NSTEMI (non-ST elevated myocardial infarction)  -S/p stent  -dapt           Alexander Mcmanus MD  Pulmonology  [facility] [place] [facility]    Will be available upon request.

## 2023-04-21 NOTE — NURSING
Pt remains in ICU. Adequate UOP per external cath. One small BM. No fevers. VSS. Remained on trach collar. No issues or complaints. Will continue to closely monitor.

## 2023-04-21 NOTE — NURSING
[facility]  Nurses Note -- 4 Eyes      4/20/2023       Skin assessed on: Q Shift      [x] No Pressure Injuries Present    [x]Prevention Measures Documented    [] Yes LDA  for Pressure Injury Previously documented     [] Yes New Pressure Injury Discovered   [] LDA for New Pressure Injury Added      Attending RN:  Ruth Ann Montague RN     Second RN:  Zunilda Leavitt RN

## 2023-04-21 NOTE — PLAN OF CARE
Problem: Occupational Therapy  Goal: Occupational Therapy Goal  Description: Goals to be met by: 5/3/23     Patient will increase functional independence with ADLs by performing:    LE Dressing with Modified [place] and Supervision.  Grooming while standing at sink with Stand-by Assistance and seated rest breaks as needed.   Toileting from toilet with Stand-by Assistance as needed for hygiene and clothing management.   Step transfer with Modified [place].   Toilet transfer to toilet with Modified [place].   Upper extremity exercise program x10 reps per handout, with assistance as needed.  Implementation of PLB and energy conservation strategies for safe performance w/ ADLs and functional mobility.     4/21/2023 1202 by Juan Mcdowell OT  Outcome: Ongoing, Progressing

## 2023-04-21 NOTE — ASSESSMENT & PLAN NOTE
Continue duonebs but not suspecting this as the primary  at this time.  - thick secretions have been an issue, adding cpt and mucomyst nebs to assist with thinning secretions for clearance.   - follow up sputum culture with pan sensitive pseudomonas.  S/p 7 days of cefapime.    -stenotrophomonas on bactrim since 4/10.  Recommend 10-14 days of therapy

## 2023-04-21 NOTE — PT/OT/SLP PROGRESS
Occupational Therapy   Treatment    Name: Fareed Richard Jr.  MRN: [id number]  Admitting Diagnosis:  Acute on chronic respiratory failure with hypoxia and hypercapnia  25 Days Post-Op    Recommendations:     Discharge Recommendations:  (TBD pending ongoing assessment.)  Discharge Equipment Recommendations:   (TBD pending ongoing assessment.)  Barriers to discharge:   (Pt currently in ICU on 10L NC.)    Assessment:     Fareed Richard Jr. is a 73 y.o. male with a medical diagnosis of Acute on chronic respiratory failure with hypoxia and hypercapnia.   Performance deficits affecting function are weakness, impaired endurance, impaired self care skills, impaired functional mobility, gait instability, impaired balance, impaired cardiopulmonary response to activity, decreased safety awareness.     Pt pleasant and willing to participate in tx session this date; pt w/ progress as he was able to perform standing BUE standing TE for increasing UB strength and endurance for safe performance w/ ADLs and functional mobility. Pt w/ decreased endurance but was able to tolerated well w/ standing rest breaks, SPO2 88-91% upon exertion. Pt tolerated tx session well and will continue to benefit from skilled acute OT services to maximize functional capacity for safe performance w/ ADLs and functional mobility.     Rehab Prognosis:  Good; patient would benefit from acute skilled OT services to address these deficits and reach maximum level of function.       Plan:     Patient to be seen 3 x/week to address the above listed problems via therapeutic activities, self-care/home management, therapeutic exercises  Plan of Care Expires: 05/03/23  Plan of Care Reviewed with: patient    Subjective     Chief Complaint: None stated   Patient/Family Comments/goals: Willing to participate   Pain/Comfort:  Pain Rating 1: 0/10  Pain Rating Post-Intervention 1: 0/10    Objective:     Communicated with: Nurse prior to session.  Patient found HOB elevated  with telemetry, oxygen, Tracheostomy, PICC line, blood pressure cuff upon OT entry to room.    General Precautions: Standard, fall, respiratory    Orthopedic Precautions:N/A  Braces: N/A  Respiratory Status: 10L w/ Tracheostomy      Occupational Performance:     Bed Mobility:    Patient completed Scooting hips to EOB with stand by assistance  Patient completed Supine to Sit with stand by assistance and HOB elevated     Functional Mobility/Transfers:  Patient completed Sit <> Stand Transfer x 1 trial from EOB with stand by assistance and contact guard assistance  with  rolling walker   Patient completed Bed <> Chair Transfer using Step Transfer technique with stand by assistance and contact guard assistance with rolling walker    Activities of Daily Living:  Upper Body Dressing: minimum assistance for donning gown over back     [facility] 6 Click ADL: 21    Treatment & Education:  -Pt performed ADLs and functional mobility as noted above.   -Pt performed standing BUE AROM for 2 sets x 10 reps w/ RW in front for support as needed; pt tolerated ~4-5 in of standing to perform the following:    -standing marches   -elbow flexion/ext    -shoulder abd/add (elbows flexed)    -diagonal reaches w/ trunk rotation  -Pt educated on importance of pacing activities and implementing PLB to prevent over-exertion.   -Questions and concerns addressed within scope.     Patient left up in chair with all lines intact and call button in reach    GOALS:   Multidisciplinary Problems       Occupational Therapy Goals          Problem: Occupational Therapy    Goal Priority Disciplines Outcome Interventions   Occupational Therapy Goal     OT, PT/OT Ongoing, Progressing    Description: Goals to be met by: 5/3/23     Patient will increase functional independence with ADLs by performing:    LE Dressing with Modified [place] and Supervision.  Grooming while standing at sink with Stand-by Assistance and seated rest breaks as needed.   Toileting from  toilet with Stand-by Assistance as needed for hygiene and clothing management.   Step transfer with Modified [place].   Toilet transfer to toilet with Modified [place].   Upper extremity exercise program x10 reps per handout, with assistance as needed.  Implementation of PLB and energy conservation strategies for safe performance w/ ADLs and functional mobility.                          Time Tracking:     OT Date of Treatment: 04/21/23  OT Start Time: 0843  OT Stop Time: 0857  OT Total Time (min): 14 min    Billable Minutes:Therapeutic Exercise 14    OT/NELLY: OT          4/21/2023

## 2023-04-21 NOTE — PT/OT/SLP RE-EVAL
Physical Therapy Re-evaluation    Patient Name:  Fareed Richard Jr.   MRN:  [id number]    Recommendations:     Discharge Recommendations:  (PT at next level of care, most likely will still need LTAC to wean down O2)  Discharge Equipment Recommendations: none   Barriers to discharge:  In ICU with Increased O2 demand     Assessment:     Fareed Richard Jr. is a 73 y.o. male admitted with a medical diagnosis of Acute on chronic respiratory failure with hypoxia and hypercapnia.  He presents with the following impairments/functional limitations: impaired cardiopulmonary response to activity, gait instability, decreased coordination, decreased safety awareness (generalized weakness and deconditioning) .    Rehab Prognosis:  Fair patient would benefit from acute skilled PT services to address these deficits and reach maximum level of function.      Recent Surgery: Procedure(s) (LRB):  Angioplasty-coronary (Left)  Aortogram, Aortic Arch  AORTOGRAM, WITH SERIALOGRAPHY (N/A)  Stent, Coronary, Multi Vessel 25 Days Post-Op    Plan:     During this hospitalization, patient to be seen  (2-3x/wk) to address the above listed problems via gait training, therapeutic activities, therapeutic exercises, neuromuscular re-education, wheelchair management/training  Plan of Care Expires:  05/05/23  Plan of Care Reviewed with: patient    Subjective     Communicated with nsg prior to session.  Patient found HOB elevated with Tracheostomy, oxygen, PICC line (ICU monitoring) upon PT entry to room, agreeable to evaluation.      Chief Complaint: Pt shaking Head No, not wanting to get up with PT.    Patient comments/goals: Pt agreeable to Re-eval with encouragement and education  Pain Rating 1: 0/10    Patients cultural, spiritual, Nondenominational conflicts given the current situation: no      Objective:     Patient found with: Tracheostomy, oxygen, PICC line (ICU monitoring)     General Precautions: Standard, fall, respiratory  Orthopedic Precautions:  N/A  Braces: N/A  Respiratory Status:  60%/10L Trach collar    Exams:  Cognitive Exam:  Patient is oriented to Person, Place, and Time  Gross Motor Coordination:  impaired 2/2 gen weakness and deconditioning from Prolonged Hospital stay requiring Intubation   Postural Exam:  Patient presented with the following abnormalities:    -       Rounded shoulders  -       Forward head  -       Abnormal trunk flexion  Sensation:    -       Intact  B LE's and light/touch    Skin Integrity/Edema:      -       Skin integrity: Visible skin intact  -       Edema: None noted    LUE Strength: WFL  RLE ROM: WFL  RLE Strength: WFL  LLE ROM: WFL    Functional Mobility:  Bed Mobility:     Scooting: modified independence  Supine to Sit: modified independence  Sit to Supine: modified independence  Transfers:     Sit to Stand:  contact guard assistance with no AD  Gait: CGA x 4 sidesteps at EOB, limited bu ICU lines  Balance: Fair+ sit, Fair stand    AM-PAC 6 CLICK MOBILITY  Total Score:20       Treatment and Education:   Reviewed TE to be performed while up in chair(AP's, FAQ',s Marching) and while in bed/recliner(APs, TKE's, GS, Hip ABd).  Pt shakes his head yes but  does not perform    Patient left HOB elevated with all lines intact, call button in reach, and nsg notified.    GOALS:   Multidisciplinary Problems       Physical Therapy Goals          Problem: Physical Therapy    Goal Priority Disciplines Outcome Goal Variances Interventions   Physical Therapy Goal     PT, PT/OT Unable to Meet, Plan Revised     Description: Goals to be met by: 23     Patient will increase functional independence with mobility by performin. Supine to sit with Modified [place]  2. Sit to stand transfer with Supervision  3. Bed to chair transfer with Supervision   4. Gait  x 10-15 feet with Contact Guard Assistance using Rolling Walker.   5. Lower extremity exercise program x10 reps per handout, with supervision  6. W/C propulsion x 25' with  Supervision                         History:     Past Medical History:   Diagnosis Date    Cancer     skin to Rt forearm and face    COPD (chronic obstructive pulmonary disease)     Hyperlipidemia     Hypertension     Pseudoaneurysm        Past Surgical History:   Procedure Laterality Date    ABDOMINAL SURGERY      stents placed in liver and large intestines, per patient    ANGIOGRAPHY OF AORTIC ARCH  3/27/2023    Procedure: Aortogram, Aortic Arch;  Surgeon: Tim Bhatt MD;  Location: [facility] CATH LAB;  Service: Cardiology;;    AORTOGRAPHY WITH SERIALOGRAPHY N/A 3/27/2023    Procedure: AORTOGRAM, WITH SERIALOGRAPHY;  Surgeon: Tim Bhatt MD;  Location: [facility] CATH LAB;  Service: Cardiology;  Laterality: N/A;    CAROTID STENT      COLONOSCOPY N/A 09/27/2022    Procedure: COLONOSCOPY;  Surgeon: Donnie Peterson MD;  Location: [facility] ENDO (2ND FLR);  Service: Endoscopy;  Laterality: N/A;    COLONOSCOPY N/A 09/30/2022    Procedure: COLONOSCOPY;  Surgeon: Joy Cabrera MD;  Location: [facility] (2ND FLR);  Service: Endoscopy;  Laterality: N/A;    CORONARY STENT PLACEMENT  01/2000    DISSECTION OF NECK Bilateral 01/04/2022    Procedure: DISSECTION, NECK;  Surgeon: Naeem Smalls MD;  [facility];  Service: ENT;  Laterality: Bilateral;    ESOPHAGOGASTRODUODENOSCOPY N/A 09/27/2022    Procedure: EGD (ESOPHAGOGASTRODUODENOSCOPY);  Surgeon: Donnie Peterson MD;  Location: [facility] ENDO (2ND FLR);  Service: Endoscopy;  Laterality: N/A;    ESOPHAGOGASTRODUODENOSCOPY N/A 09/30/2022    Procedure: EGD (ESOPHAGOGASTRODUODENOSCOPY);  Surgeon: Joy Cabrera MD;  Location: [facility] ENDO (2ND FLR);  Service: Endoscopy;  Laterality: N/A;    ESOPHAGOGASTRODUODENOSCOPY W/ PEG N/A 01/04/2022    Procedure: EGD, WITH PEG TUBE INSERTION;  Surgeon: Anthony Calabrese MD;  Location: [facility] OR 2ND FLR;  Service: General;  Laterality: N/A;    EYE SURGERY      Cataract bilateral    femoral stents      bilateral    FLAP PROCEDURE N/A  01/03/2022    Procedure: CREATION, FREE FLAP;  Surgeon: Naeem Smalls MD;  Location: [facility] OR 2ND FLR;  Service: ENT;  Laterality: N/A;    FLAP PROCEDURE Right 01/04/2022    Procedure: CREATION, FREE FLAP;  Surgeon: Stacey Conde MD;  Location: [facility] OR 2ND FLR;  Service: ENT;  Laterality: Right;  Ischemic start 1203  Ischemic stop 1353    GLOSSECTOMY N/A 01/04/2022    Procedure: GLOSSECTOMY;  Surgeon: Naeem Smalls MD;  Location: [facility] OR 2ND FLR;  Service: ENT;  Laterality: N/A;    LEFT HEART CATHETERIZATION Left 3/23/2023    Procedure: Left heart cath;  Surgeon: Tacos Benitez MD;  Location: [facility] CATH LAB;  Service: Cardiology;  Laterality: Left;    PERCUTANEOUS TRANSLUMINAL BALLOON ANGIOPLASTY OF CORONARY ARTERY Left 3/27/2023    Procedure: Angioplasty-coronary;  Surgeon: Tim Bhatt MD;  Location: [facility] CATH LAB;  Service: Cardiology;  Laterality: Left;  not before 9am, R rad access, 6 Fr EBU 3.5 guide    RADICAL NECK DISSECTION Left 01/03/2022    Procedure: DISSECTION, NECK, RADICAL;  Surgeon: Naeem Smalls MD;  [facility];  Service: ENT;  Laterality: Left;    SKIN BIOPSY      SKIN CANCER EXCISION      STENT, CORONARY, MULTI VESSEL  3/27/2023    Procedure: Stent, Coronary, Multi Vessel;  Surgeon: Tim Bhatt MD;  Location: [facility] CATH LAB;  Service: Cardiology;;    TRACHEOTOMY N/A 01/04/2022    Procedure: TRACHEOTOMY;  Surgeon: Naeem Smalls MD;  Location: [facility] OR 2ND FLR;  Service: ENT;  Laterality: N/A;    VASCULAR SURGERY         Time Tracking:     PT Received On: 04/21/23  PT Start Time: 1405     PT Stop Time: 1416  PT Total Time (min): 11 min     Billable Minutes: Re-eval 11      04/21/2023

## 2023-04-21 NOTE — PLAN OF CARE
"On arrival to room safety measures were put in place. Patient vitals were stable per monitor. G-tube has repeated issues with pump alarming but issues were resolved with prescribed water flushes. Ambulation to chair was well tolerated with no vital sign changes per monitor or complaints of discomfort from patient. Urine output was dismal but improved with medication administration (see MAR). Patient became uncomfortable with continuous feeding. Patient then refused continuation of the feeding and requested a "break". Feeding was turned off at 1755.Will report to oncoming shift the need to follow up. Spare trachs, obturators, and inner cannulas of the appropriate size left at bedside. All other safety measures in place. Plan is for patient to be transported to [facility] unit when there is bed availability. At shift change, Pt. was not showing any s/s of distress per assessment of pink perfused lips, normal breathing patterns on room air, and calm demeaner. VSS per monitor.   "

## 2023-04-21 NOTE — NURSING
[facility]  Nurses Note -- 4 Eyes      4/21/2023       Skin assessed on: Q Shift      [x] No Pressure Injuries Present    [x]Prevention Measures Documented    [] Yes LDA  for Pressure Injury Previously documented     [] Yes New Pressure Injury Discovered   [] LDA for New Pressure Injury Added      Attending RN:  Aretha Wade RN     Second RN:  Danny TRIANA

## 2023-04-21 NOTE — SUBJECTIVE & OBJECTIVE
Interval History: no acute issue.  Off vent support since 4/18/23.  Oob with pt.        Objective:     Vital Signs (Most Recent):  Temp: 97.8 °F (36.6 °C) (04/21/23 0701)  Pulse: 100 (04/21/23 0900)  Resp: (!) 34 (04/21/23 0830)  BP: 107/60 (04/21/23 0900)  SpO2: 99 % (04/21/23 0830) Vital Signs (24h Range):  Temp:  [97.8 °F (36.6 °C)-98.6 °F (37 °C)] 97.8 °F (36.6 °C)  Pulse:  [] 100  Resp:  [15-56] 34  SpO2:  [85 %-100 %] 99 %  BP: ()/(55-70) 107/60     Weight: 62.9 kg (138 lb 10.7 oz)  Body mass index is 21.08 kg/m².      Intake/Output Summary (Last 24 hours) at 4/21/2023 0955  Last data filed at 4/21/2023 0600  Gross per 24 hour   Intake 1840 ml   Output 1250 ml   Net 590 ml         Physical Exam  Vitals reviewed.   Constitutional:       Appearance: He is ill-appearing (chronically ill). He is not toxic-appearing.   HENT:      Head: Normocephalic and atraumatic.      Nose: Nose normal. No congestion.      Mouth/Throat:      Mouth: Mucous membranes are moist.      Pharynx: Oropharynx is clear. No oropharyngeal exudate.   Eyes:      Extraocular Movements: Extraocular movements intact.      Conjunctiva/sclera: Conjunctivae normal.      Pupils: Pupils are equal, round, and reactive to light.   Neck:      Comments: Cuffed 6 trach.defated  Cardiovascular:      Rate and Rhythm: Normal rate and regular rhythm.      Pulses: Normal pulses.      Heart sounds: No murmur heard.  Pulmonary:      Effort: Tachypnea present. No respiratory distress.      Breath sounds: No wheezing or rhonchi.   Abdominal:      General: Abdomen is flat. Bowel sounds are normal.      Palpations: Abdomen is soft.   Musculoskeletal:         General: No swelling.   Skin:     General: Skin is warm and dry.      Capillary Refill: Capillary refill takes less than 2 seconds.   Neurological:      General: No focal deficit present.      Mental Status: He is oriented to person, place, and time.     Review of Systems    Vents:  Vent Mode: PS/CPAP  (04/18/23 1708)  Ventilator Initiated: Yes (04/11/23 0936)  Set Rate: 5736.1 BPM (04/18/23 1708)  Vt Set: 400 mL (04/14/23 0821)  Pressure Support: 8 cmH20 (04/18/23 1708)  PEEP/CPAP: 8 cmH20 (04/18/23 1708)  Oxygen Concentration (%): 60 (04/21/23 0812)  Peak Airway Pressure: 17.1 cmH20 (04/18/23 0422)  Total Ve: 8.8 L/m (04/18/23 0422)  F/VT Ratio<105 (RSBI): (!) 86.76 (04/18/23 0422)    Lines/Drains/Airways       Peripherally Inserted Central Catheter Line  Duration             PICC Triple Lumen 03/20/23 2145 right basilic 31 days              Drain  Duration                  Gastrostomy/Enterostomy 01/04/22 Percutaneous endoscopic gastrostomy (PEG)  days    Male External Urinary Catheter 04/12/23 1900 8 days              Airway  Duration             Adult Surgical Airway 03/16/23 1014 Shiley Cuffed 6.0 35 days                    Significant Labs:    CBC/Anemia Profile:  Recent Labs   Lab 04/20/23  0302 04/21/23  0313   WBC 4.88 5.43   HGB 8.6* 8.2*   HCT 28.1* 26.3*    247   MCV 95 94   RDW 16.8* 16.3*          Chemistries:  Recent Labs   Lab 04/20/23  0302 04/21/23  0313   * 134*   K 4.7 5.1   CL 96 95   CO2 32* 32*   BUN 26* 23   CREATININE 0.9 0.8   CALCIUM 8.9 8.7   ALBUMIN 1.9* 1.9*   PROT 5.9* 5.8*   BILITOT 0.2 0.2   ALKPHOS 99 91   ALT 21 19   AST 26 25       Thoracentesis  Wbc 315 (s14, l63, m23); amylase 16; glucose of 120; ldh 169; protein 3  Culture ngtd  Cytology pending    All pertinent labs within the past 24 hours have been reviewed.    Significant Imaging:  I have reviewed all pertinent imaging results/findings within the past 24 hours.  CXR: I have reviewed all pertinent results/findings within the past 24 hours and my personal findings are:  4/21/23 persistent left basilar pneumo.  +reaccumulation of left effusion

## 2023-04-21 NOTE — PLAN OF CARE
Problem: Physical Therapy  Goal: Physical Therapy Goal  Description: Goals to be met by: 23     Patient will increase functional independence with mobility by performin. Supine to sit with Modified [place]  2. Sit to stand transfer with Supervision  3. Bed to chair transfer with Supervision   4. Gait  x 10-15 feet with Contact Guard Assistance using Rolling Walker.   5. Lower extremity exercise program x10 reps per handout, with supervision  6. W/C propulsion x 25' with Supervision    2023 1432 by Jennifer Ann, PT  Outcome: Unable to Meet, Plan Revised  PT Re-evaluation completed today.  Pt to be seen 2-3x/wk for skilled PT services in order to maximize function prior to D/C.  Pt limited 2/2 Increased O2 demand.  OK for OOB to chair and ambulate to bathroom/BSC with Nursing staff.  PT at next level of care recommended.  Hospital bed, W/C, and BSC also recommended.

## 2023-04-21 NOTE — ASSESSMENT & PLAN NOTE
Oxygen Concentration (%):  [60-98] 60    - was on trache collar prior to admission.  Recurring hypercapnic respiratory failure requiring continuous PS  - tolerating PS well 24/7  - trache collar since 4/18  - COPD treatment as above  - sputum culture- with pseudomonas  - cxr with chronic left effusion.  pocus 4/18/23 with 3 cm layered effusion.  S/p thoracentesis on 4/18.  -result consistent with exudative.  No evidence of infection.  Cytology pending.      -bnp 1100 on 4/18.  Recommend resumption of lasix po daily

## 2023-04-21 NOTE — ASSESSMENT & PLAN NOTE
-trapped lung + post procedure  -patient decline pleural drain  -repeat cxr with persisting basilar pneumo that is smaller than post thoracentesis film.  I suspect residual pneumo is from trapped lung physiology and will persist until fluid fully reaccumulate.  .

## 2023-04-22 LAB
ALBUMIN SERPL BCP-MCNC: 2 G/DL (ref 3.5–5.2)
ALP SERPL-CCNC: 97 U/L (ref 55–135)
ALT SERPL W/O P-5'-P-CCNC: 22 U/L (ref 10–44)
ANION GAP SERPL CALC-SCNC: 8 MMOL/L (ref 8–16)
AST SERPL-CCNC: 29 U/L (ref 10–40)
BACTERIA FLD AEROBE CULT: NO GROWTH
BACTERIA SPEC ANAEROBE CULT: NORMAL
BASOPHILS # BLD AUTO: 0.02 K/UL (ref 0–0.2)
BASOPHILS NFR BLD: 0.4 % (ref 0–1.9)
BILIRUB SERPL-MCNC: 0.3 MG/DL (ref 0.1–1)
BUN SERPL-MCNC: 21 MG/DL (ref 8–23)
CALCIUM SERPL-MCNC: 9 MG/DL (ref 8.7–10.5)
CHLORIDE SERPL-SCNC: 94 MMOL/L (ref 95–110)
CO2 SERPL-SCNC: 30 MMOL/L (ref 23–29)
CREAT SERPL-MCNC: 0.9 MG/DL (ref 0.5–1.4)
DIFFERENTIAL METHOD: ABNORMAL
EOSINOPHIL # BLD AUTO: 0.2 K/UL (ref 0–0.5)
EOSINOPHIL NFR BLD: 4.7 % (ref 0–8)
ERYTHROCYTE [DISTWIDTH] IN BLOOD BY AUTOMATED COUNT: 16.3 % (ref 11.5–14.5)
EST. GFR  (NO RACE VARIABLE): >60 ML/MIN/1.73 M^2
GLUCOSE SERPL-MCNC: 120 MG/DL (ref 70–110)
GRAM STN SPEC: NORMAL
HCT VFR BLD AUTO: 28.5 % (ref 40–54)
HGB BLD-MCNC: 8.9 G/DL (ref 14–18)
IMM GRANULOCYTES # BLD AUTO: 0.02 K/UL (ref 0–0.04)
IMM GRANULOCYTES NFR BLD AUTO: 0.4 % (ref 0–0.5)
LYMPHOCYTES # BLD AUTO: 0.4 K/UL (ref 1–4.8)
LYMPHOCYTES NFR BLD: 9 % (ref 18–48)
MCH RBC QN AUTO: 28.8 PG (ref 27–31)
MCHC RBC AUTO-ENTMCNC: 31.2 G/DL (ref 32–36)
MCV RBC AUTO: 92 FL (ref 82–98)
MONOCYTES # BLD AUTO: 0.6 K/UL (ref 0.3–1)
MONOCYTES NFR BLD: 11.5 % (ref 4–15)
NEUTROPHILS # BLD AUTO: 3.6 K/UL (ref 1.8–7.7)
NEUTROPHILS NFR BLD: 74 % (ref 38–73)
NRBC BLD-RTO: 0 /100 WBC
PLATELET # BLD AUTO: 251 K/UL (ref 150–450)
PMV BLD AUTO: 9.8 FL (ref 9.2–12.9)
POTASSIUM SERPL-SCNC: 4.5 MMOL/L (ref 3.5–5.1)
PROT SERPL-MCNC: 6.2 G/DL (ref 6–8.4)
RBC # BLD AUTO: 3.09 M/UL (ref 4.6–6.2)
SODIUM SERPL-SCNC: 132 MMOL/L (ref 136–145)
WBC # BLD AUTO: 4.89 K/UL (ref 3.9–12.7)

## 2023-04-22 PROCEDURE — 25000003 PHARM REV CODE 250: Performed by: HOSPITALIST

## 2023-04-22 PROCEDURE — 21400001 HC TELEMETRY ROOM

## 2023-04-22 PROCEDURE — 94761 N-INVAS EAR/PLS OXIMETRY MLT: CPT

## 2023-04-22 PROCEDURE — 94640 AIRWAY INHALATION TREATMENT: CPT

## 2023-04-22 PROCEDURE — A4216 STERILE WATER/SALINE, 10 ML: HCPCS | Performed by: HOSPITALIST

## 2023-04-22 PROCEDURE — 25000242 PHARM REV CODE 250 ALT 637 W/ HCPCS: Performed by: INTERNAL MEDICINE

## 2023-04-22 PROCEDURE — 27200966 HC CLOSED SUCTION SYSTEM

## 2023-04-22 PROCEDURE — 25000003 PHARM REV CODE 250: Performed by: INTERNAL MEDICINE

## 2023-04-22 PROCEDURE — 99900026 HC AIRWAY MAINTENANCE (STAT)

## 2023-04-22 PROCEDURE — 80053 COMPREHEN METABOLIC PANEL: CPT | Performed by: HOSPITALIST

## 2023-04-22 PROCEDURE — 25000242 PHARM REV CODE 250 ALT 637 W/ HCPCS: Performed by: HOSPITALIST

## 2023-04-22 PROCEDURE — 63600175 PHARM REV CODE 636 W HCPCS: Performed by: STUDENT IN AN ORGANIZED HEALTH CARE EDUCATION/TRAINING PROGRAM

## 2023-04-22 PROCEDURE — 99900035 HC TECH TIME PER 15 MIN (STAT)

## 2023-04-22 PROCEDURE — 94760 N-INVAS EAR/PLS OXIMETRY 1: CPT

## 2023-04-22 PROCEDURE — 85025 COMPLETE CBC W/AUTO DIFF WBC: CPT | Performed by: HOSPITALIST

## 2023-04-22 PROCEDURE — 36415 COLL VENOUS BLD VENIPUNCTURE: CPT | Performed by: HOSPITALIST

## 2023-04-22 PROCEDURE — 27100171 HC OXYGEN HIGH FLOW UP TO 24 HOURS

## 2023-04-22 RX ADMIN — Medication 10 ML: at 05:04

## 2023-04-22 RX ADMIN — FUROSEMIDE 40 MG: 40 TABLET ORAL at 10:04

## 2023-04-22 RX ADMIN — HEPARIN SODIUM 5000 UNITS: 5000 INJECTION INTRAVENOUS; SUBCUTANEOUS at 06:04

## 2023-04-22 RX ADMIN — Medication 10 ML: at 06:04

## 2023-04-22 RX ADMIN — METOPROLOL TARTRATE 25 MG: 25 TABLET, FILM COATED ORAL at 10:04

## 2023-04-22 RX ADMIN — SULFAMETHOXAZOLE AND TRIMETHOPRIM 1 TABLET: 800; 160 TABLET ORAL at 10:04

## 2023-04-22 RX ADMIN — FERROUS SULFATE TAB 325 MG (65 MG ELEMENTAL FE) 1 EACH: 325 (65 FE) TAB at 10:04

## 2023-04-22 RX ADMIN — IPRATROPIUM BROMIDE AND ALBUTEROL SULFATE 3 ML: 2.5; .5 SOLUTION RESPIRATORY (INHALATION) at 12:04

## 2023-04-22 RX ADMIN — IPRATROPIUM BROMIDE AND ALBUTEROL SULFATE 3 ML: 2.5; .5 SOLUTION RESPIRATORY (INHALATION) at 07:04

## 2023-04-22 RX ADMIN — CHLORHEXIDINE GLUCONATE 0.12% ORAL RINSE 15 ML: 1.2 LIQUID ORAL at 08:04

## 2023-04-22 RX ADMIN — CHLORHEXIDINE GLUCONATE 0.12% ORAL RINSE 15 ML: 1.2 LIQUID ORAL at 10:04

## 2023-04-22 RX ADMIN — FAMOTIDINE 20 MG: 10 INJECTION, SOLUTION INTRAVENOUS at 10:04

## 2023-04-22 RX ADMIN — SENNOSIDES AND DOCUSATE SODIUM 2 TABLET: 50; 8.6 TABLET ORAL at 10:04

## 2023-04-22 RX ADMIN — IPRATROPIUM BROMIDE AND ALBUTEROL SULFATE 3 ML: 2.5; .5 SOLUTION RESPIRATORY (INHALATION) at 04:04

## 2023-04-22 RX ADMIN — Medication 10 ML: at 12:04

## 2023-04-22 RX ADMIN — ASPIRIN 81 MG CHEWABLE TABLET 81 MG: 81 TABLET CHEWABLE at 10:04

## 2023-04-22 RX ADMIN — HEPARIN SODIUM 5000 UNITS: 5000 INJECTION INTRAVENOUS; SUBCUTANEOUS at 03:04

## 2023-04-22 RX ADMIN — ACETYLCYSTEINE 4 ML: 100 SOLUTION ORAL; RESPIRATORY (INHALATION) at 12:04

## 2023-04-22 RX ADMIN — ATORVASTATIN CALCIUM 80 MG: 40 TABLET, FILM COATED ORAL at 10:04

## 2023-04-22 RX ADMIN — FAMOTIDINE 20 MG: 10 INJECTION, SOLUTION INTRAVENOUS at 08:04

## 2023-04-22 RX ADMIN — IPRATROPIUM BROMIDE AND ALBUTEROL SULFATE 3 ML: 2.5; .5 SOLUTION RESPIRATORY (INHALATION) at 09:04

## 2023-04-22 RX ADMIN — MELATONIN TAB 3 MG 9 MG: 3 TAB at 10:04

## 2023-04-22 RX ADMIN — ACETYLCYSTEINE 4 ML: 100 SOLUTION ORAL; RESPIRATORY (INHALATION) at 09:04

## 2023-04-22 RX ADMIN — HEPARIN SODIUM 5000 UNITS: 5000 INJECTION INTRAVENOUS; SUBCUTANEOUS at 10:04

## 2023-04-22 RX ADMIN — CLOPIDOGREL BISULFATE 75 MG: 75 TABLET ORAL at 10:04

## 2023-04-22 NOTE — PROGRESS NOTES
[facility] Medicine  Progress Note    Patient Name: Fareed Richard Jr.  MRN: [id number]  Patient Class: IP- Inpatient   Admission Date: 3/18/2023  Length of Stay: 34 days  Attending Physician: Kelby Sargent MD  Primary Care Provider: Kodi Tubbs MD        Subjective:     Principal Problem:Acute on chronic respiratory failure with hypoxia and hypercapnia        HPI:  Fareed Richard Jr. 73 y.o. 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  Per his wife 4 days ago he began to have pink tinged sputum via his trach.  He was seen by his ENT 2 days ago with a scope exam and a trach exchange without evidence of bleeding.  This morning at 3:00 a.m. he developed bright red blood via trach which resolved without intervention.  It is since not recurred.  He takes a daily aspirin.  He receives all medications via G-tube.  His tube feeding regimen consists of 6 cans of Isosource daily with 120 cc free water boluses.  He denies fever chest pain shortness of breath nausea vomiting abdominal pain leg swelling syncope dizziness dysuria melena hematuria hematemesis.    In the ED, hemoglobin 7.6 INR 1.0 BUN 50 creatinine 0.7  troponin negative BRCA negative O positive type and screen chest x-ray without detrimental change hypotensive to 85/54.      Overview/Hospital Course:  72 yo M w squamous CA of tongue s/p glossectomy and reconstructive flap with trach, CAD, chronic hypoxic respiratory failure (on 5L at home) and with peg presented for evaluation of hemoptysis 2 days after tracheostomy change in clinic. Transferred to ICU 3/19 when markedly hypotensive.  Unclear if due to hemorrhage, cardiogenic or septic shock.  Did require PRBC and TXA nebs but didn't seem like sufficient bleeding to cause shock.  Bleeding stopped and ENT following.  CTA shows either significant mucus plugging or  bibasilar pneumonia - pulmonology favored pneumonia.  Trach aspirate with Stenotrophomonas, Achromobacter, and Candida. Also urine culture growing Enterococcus.  He is on broad spectrum antibiotics including bactrim. Developed NSTEMI. Cardiology consulted. [facility] 3/23 which showed distal LM 30%, mild LAD 90% bifurcation lesion with D1, Cx mid 80%, RCA moderate diffuse disease with long 70% and some left to right collateral. All vessels heavily calcified. Not a candidate for CABG but plan for staged PCI. Continued on heparin drip, asa/plavix. Vasopressors weaned. [facility] 3/27 PCI to LAD and LCx. Post operatively he was hemodynamically unstable and anemic. Stat CTA showed RP hematoma near L kidney without acute bleeding. Pt transfused supportively. He improved over the next few days. Stepped down to floor. Remains on trach collar, higher than home O2 requirements. He has delirium as well and poor sleep. Attempted trazodone. The following morning, he was much more lethargic and was transferred to ICU for worsening acute on chronic hypoxic/hypercapnic respiratory failure. This improved after 1 day on the vent.      Trach aspirate with GNR; remains on cefepime in addition to bactrim for prior Stenotrophomonas. Improving at 98%, wean O2. Appears Steno and Achro being effectively treated with bactrim, now growing pseudomonas and has been on cefepime, improved. Abx stopped. Pt had thoracentesis with improvement in breathing, but developed a small ptx. IR consulted for CT placement, but pt refused. No longer requiring ventilatory support at night and PTX improving. Initial plan was for ltac placement, but no long needing vent so does not qualify     -pt/ot reconsulted for placement recommendations  -finishing off bactrim course      Interval History: Feels improved. CXR with re accumulation of pleural fluid    Review of Systems   Constitutional:  Negative for chills and fever.   Respiratory:  Positive for shortness of breath.  Negative for cough.    Cardiovascular:  Negative for chest pain and leg swelling.   Gastrointestinal:  Negative for abdominal distention and abdominal pain.   Objective:     Vital Signs (Most Recent):  Temp: 98.3 °F (36.8 °C) (04/22/23 0741)  Pulse: 90 (04/22/23 0912)  Resp: 16 (04/22/23 0912)  BP: 128/60 (04/22/23 0741)  SpO2: 96 % (04/22/23 0912) Vital Signs (24h Range):  Temp:  [97.9 °F (36.6 °C)-98.8 °F (37.1 °C)] 98.3 °F (36.8 °C)  Pulse:  [79-95] 90  Resp:  [16-35] 16  SpO2:  [90 %-100 %] 96 %  BP: (102-137)/(59-81) 128/60     Weight: 62.9 kg (138 lb 10.7 oz)  Body mass index is 21.08 kg/m².    Intake/Output Summary (Last 24 hours) at 4/22/2023 1058  Last data filed at 4/22/2023 0400  Gross per 24 hour   Intake 1380 ml   Output 850 ml   Net 530 ml      Physical Exam  Constitutional:       General: He is not in acute distress.     Appearance: He is ill-appearing. He is not toxic-appearing or diaphoretic.   Cardiovascular:      Rate and Rhythm: Normal rate and regular rhythm.      Heart sounds: No murmur heard.    No gallop.   Pulmonary:      Effort: Pulmonary effort is normal. No respiratory distress.      Breath sounds: Rales present. No wheezing.   Abdominal:      General: Bowel sounds are normal. There is no distension.      Palpations: Abdomen is soft.      Tenderness: There is no abdominal tenderness.       Significant Labs: All pertinent labs within the past 24 hours have been reviewed.    Significant Imaging: I have reviewed all pertinent imaging results/findings within the past 24 hours.      Assessment/Plan:      * Acute on chronic respiratory failure with hypoxia and hypercapnia  At home is on 5L O2 via trach and O2 sats typically in high 80s low 90s. Worsening respiration this admission due to aspiration of blood and likely food aspiration. Required ventilator from 3/21 to 3/23, subsequently weaned. Treated empirically for pneumonia. Respiratory cultures show Stenotrophomonas which was not treated  -  "started bactrim to treat Stenotrophomonas  - worsening hypoxia and hypercapnia on 4/11. This could be related to trazodone use (attempted 4/10 PM for sleep)   - transferred to ICU and placed on ventilator   - treating potential pneumonia. Known Stenotrophomonas- treating with bactrim. Trach aspirate with GNR- tx with cefepime completed    - does not appear that he has had another MI   - significantly improved. No longer requiring ventilatory support even HS   - Pulmonary is following       Postprocedural pneumothorax  Likely trapped lung      Pleural effusion  Pulmonology consulted who recommended thoracentesis. Performed 4/18 by IR. Pt subsequently developed persistent ptx, but respiratory wise no significant deterioration     -ir consulted for Chest tube eval, but pt refused. PTX seems to be improving. Fluid returning    Fluid studies show borderline transudate although no LDH was drawn on the day of thoracentesis      Retroperitoneal hematoma  RP hematoma discovered after LHC. See "acute blood loss anemia"      NSTEMI (non-ST elevated myocardial infarction)  See "coronary artery disease"      Anxiety  Anxiety waxes and wanes with clinical status. He also has delirium  - Stopped hydroxyzine as this could be contributing to delirium   - Prior bad reaction to ativan  - Attempted trazodone to help sleep and then had worsening encephalopathy and hypoxic/hypercapnic respiratory failure the next morning  - may try mirtazapine if this becomes an issue    Hemoptysis  -See acute blood loss anemia  -this has resolved     Pneumonia  3/22 Respiratory culture: Stenotrophomonas, Achromobacter--> on bactrim x14 days for this  4/11 Respiratory culture: pseudomonas -> completed cefepime course    PEG (percutaneous endoscopic gastrostomy) status  Continue medications via PEG. Does not take oral intake.   -On isosource 1.5 6 days daily with 120cc free water flushes via wife  -Will continue home tube feedings, with nutrition " consulted    Tracheostomy present  See respiratory failure     ACP (advance care planning)  Advance Care Planning     Date: 04/10/2023  Palliative consulted, reviewed documentation. Full code. Needs home palliative follow up upon discharge. Time spent reviewing on 4/10/23= 5 minutes          Severe protein-calorie malnutrition  RD following. On tube feeds.      Secondary malignant neoplasm of cervical lymph node  Noted      Acute blood loss anemia  Pt presented with hemoptysis. Scopes prior to admit and by ENT during admit not showing clear source. Bleeding improved. Pt underwent LHC for NSTEMI. After that procedure he was found to have a large retroperitoneal hematoma. Since starting DAPT he has also had recurrent hemoptysis as well as melanic stools. Trach cuff reinflated 4/2 with improvement in bleeding.  - trend CBC, transfuse prn  - Hgb now stable  - continue asa, plavix     COPD exacerbation  This seems resolved; however, did develop worsening hypoxic/hypercapnic respiratory failure  - coninue nebs      Other hyperlipidemia  -Continue home statin    Primary hypertension  Previously hypotensive, now normotensive  - continue metoprolol     Coronary artery disease involving native coronary artery of native heart with unstable angina pectoris  Troponin elevated when pt was upgraded to the ICU. At that time it was thought to be due to demand, however he had persistent chest pain concerning for ACS. Was started on heparin gtt and tolerated. Subsequently went to [facility] which showed diffuse disease. Pt not a candidate for CABG. He subsequently underwent PCI of LAD and LCx.  - Continue asa, plavix, statin    Squamous cell cancer of tongue  Dx 2021, now s/p total glossectomy, bilateral neck dissection, bilateral cervical facial flaps and anterolateral thigh flap reconstruction of glossectomy defect 1/4/22. Completed adjuvant chemoradiation. Had trach changed by ENT 2 days prior to admit and scope at that time showed no  signs of bleeding despite hemoptysis present on admission.     Carotid stenosis  Continues on asa, plavix, statin       Peripheral vascular disease  Continues on asa, plavix, statin         VTE Risk Mitigation (From admission, onward)         Ordered     heparin (porcine) injection 5,000 Units  Every 8 hours         04/20/23 1325     IP VTE LOW RISK PATIENT  Once         04/11/23 0909     Place sequential compression device  Until discontinued         03/18/23 1620     Place NIKITA hose  Until discontinued         03/18/23 1620                Discharge Planning   NISA: 4/25/2023     Code Status: Full Code   Is the patient medically ready for discharge?:     Reason for patient still in hospital (select all that apply): Patient trending condition, Laboratory test, Treatment, Consult recommendations and Pending disposition  Discharge Plan A: Long-term acute care facility (LTAC)   Discharge Delays: None known at this time              Kelby Sargent MD  Department of Hospital Medicine   [facility]

## 2023-04-22 NOTE — NURSING
[facility], [facility]  Nurses Note -- 4 Eyes      4/22/2023       Skin assessed on: Q Shift      [x] No Pressure Injuries Present    []Prevention Measures Documented    [] Yes LDA  for Pressure Injury Previously documented     [] Yes New Pressure Injury Discovered   [] LDA for New Pressure Injury Added      Attending RN:  Yvette Islas RN     Second RN:  Aretha Wade RN

## 2023-04-22 NOTE — ASSESSMENT & PLAN NOTE
Troponin elevated when pt was upgraded to the ICU. At that time it was thought to be due to demand, however he had persistent chest pain concerning for ACS. Was started on heparin gtt and tolerated. Subsequently went to [facility] which showed diffuse disease. Pt not a candidate for CABG. He subsequently underwent PCI of LAD and LCx.  - Continue asa, plavix, statin

## 2023-04-22 NOTE — NURSING
Handoff communication given to oncoming nurse. Pt VSS and NAD at this time. Bed alarm set and in lowest locked position. 4 eyes skin assessment completed. Personal items, brief on and dry, and call light within reach.

## 2023-04-22 NOTE — NURSING
[facility], [facility] [place]  Nurses Note -- 4 Eyes      4/22/2023       Skin assessed on: Q Shift      [x] No Pressure Injuries Present    [x]Prevention Measures Documented    [] Yes LDA  for Pressure Injury Previously documented     [] Yes New Pressure Injury Discovered   [] LDA for New Pressure Injury Added      Attending RN:  Flaco Rubin RN     Second RN:  Yvette WILLIAM RN

## 2023-04-22 NOTE — ASSESSMENT & PLAN NOTE
Pulmonology consulted who recommended thoracentesis. Performed 4/18 by IR. Pt subsequently developed persistent ptx, but respiratory wise no significant deterioration     -ir consulted for Chest tube eval, but pt refused. PTX seems to be improving. Fluid returning    Fluid studies show borderline transudate although no LDH was drawn on the day of thoracentesis

## 2023-04-22 NOTE — PROGRESS NOTES
[facility] Medicine  Progress Note    Patient Name: Fareed Richard Jr.  MRN: [id number]  Patient Class: IP- Inpatient   Admission Date: 3/18/2023  Length of Stay: 34 days  Attending Physician: Kelby Sargent MD  Primary Care Provider: Kodi Tubbs MD        Subjective:     Principal Problem:Acute on chronic respiratory failure with hypoxia and hypercapnia        HPI:  Fareed Richard Jr. 73 y.o. 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  Per his wife 4 days ago he began to have pink tinged sputum via his trach.  He was seen by his ENT 2 days ago with a scope exam and a trach exchange without evidence of bleeding.  This morning at 3:00 a.m. he developed bright red blood via trach which resolved without intervention.  It is since not recurred.  He takes a daily aspirin.  He receives all medications via G-tube.  His tube feeding regimen consists of 6 cans of Isosource daily with 120 cc free water boluses.  He denies fever chest pain shortness of breath nausea vomiting abdominal pain leg swelling syncope dizziness dysuria melena hematuria hematemesis.    In the ED, hemoglobin 7.6 INR 1.0 BUN 50 creatinine 0.7  troponin negative BRCA negative O positive type and screen chest x-ray without detrimental change hypotensive to 85/54.      Overview/Hospital Course:  74 yo M w squamous CA of tongue s/p glossectomy and reconstructive flap with trach, CAD, chronic hypoxic respiratory failure (on 5L at home) and with peg presented for evaluation of hemoptysis 2 days after tracheostomy change in clinic. Transferred to ICU 3/19 when markedly hypotensive.  Unclear if due to hemorrhage, cardiogenic or septic shock.  Did require PRBC and TXA nebs but didn't seem like sufficient bleeding to cause shock.  Bleeding stopped and ENT following.  CTA shows either significant mucus plugging or  bibasilar pneumonia - pulmonology favored pneumonia.  Trach aspirate with Stenotrophomonas, Achromobacter, and Candida. Also urine culture growing Enterococcus.  He is on broad spectrum antibiotics including bactrim. Developed NSTEMI. Cardiology consulted. [facility] 3/23 which showed distal LM 30%, mild LAD 90% bifurcation lesion with D1, Cx mid 80%, RCA moderate diffuse disease with long 70% and some left to right collateral. All vessels heavily calcified. Not a candidate for CABG but plan for staged PCI. Continued on heparin drip, asa/plavix. Vasopressors weaned. [facility] 3/27 PCI to LAD and LCx. Post operatively he was hemodynamically unstable and anemic. Stat CTA showed RP hematoma near L kidney without acute bleeding. Pt transfused supportively. He improved over the next few days. Stepped down to floor. Remains on trach collar, higher than home O2 requirements. He has delirium as well and poor sleep. Attempted trazodone. The following morning, he was much more lethargic and was transferred to ICU for worsening acute on chronic hypoxic/hypercapnic respiratory failure. This improved after 1 day on the vent.      Trach aspirate with GNR; remains on cefepime in addition to bactrim for prior Stenotrophomonas. Improving at 98%, wean O2. Appears Steno and Achro being effectively treated with bactrim, now growing pseudomonas and has been on cefepime, improved. Abx stopped. Pt had thoracentesis with improvement in breathing, but developed a small ptx. IR consulted for CT placement, but pt refused. No longer requiring ventilatory support at night and PTX improving. Initial plan was for ltac placement, but no long needing vent so does not qualify     -pt/ot reconsulted for placement recommendations  -finishing off bactrim course      Interval History: No new complaints. Transferred to the floor    Review of Systems   Constitutional:  Negative for chills and fever.   Respiratory:  Positive for shortness of breath. Negative for cough.     Cardiovascular:  Negative for chest pain and leg swelling.   Gastrointestinal:  Negative for abdominal distention and abdominal pain.   Objective:     Vital Signs (Most Recent):  Temp: 98.3 °F (36.8 °C) (04/22/23 0741)  Pulse: 90 (04/22/23 0912)  Resp: 16 (04/22/23 0912)  BP: 128/60 (04/22/23 0741)  SpO2: 96 % (04/22/23 0912) Vital Signs (24h Range):  Temp:  [97.9 °F (36.6 °C)-98.8 °F (37.1 °C)] 98.3 °F (36.8 °C)  Pulse:  [79-95] 90  Resp:  [16-35] 16  SpO2:  [90 %-100 %] 96 %  BP: (102-137)/(59-81) 128/60     Weight: 62.9 kg (138 lb 10.7 oz)  Body mass index is 21.08 kg/m².    Intake/Output Summary (Last 24 hours) at 4/22/2023 1103  Last data filed at 4/22/2023 0400  Gross per 24 hour   Intake 980 ml   Output 750 ml   Net 230 ml        Physical Exam  Constitutional:       General: He is not in acute distress.     Appearance: He is ill-appearing. He is not toxic-appearing or diaphoretic.   Cardiovascular:      Rate and Rhythm: Normal rate and regular rhythm.      Heart sounds: No murmur heard.    No gallop.   Pulmonary:      Effort: Pulmonary effort is normal. No respiratory distress.      Breath sounds: Rales present. No wheezing.   Abdominal:      General: Bowel sounds are normal. There is no distension.      Palpations: Abdomen is soft.      Tenderness: There is no abdominal tenderness.       Significant Labs: All pertinent labs within the past 24 hours have been reviewed.    Significant Imaging: I have reviewed all pertinent imaging results/findings within the past 24 hours.      Assessment/Plan:      * Acute on chronic respiratory failure with hypoxia and hypercapnia  At home is on 5L O2 via trach and O2 sats typically in high 80s low 90s. Worsening respiration this admission due to aspiration of blood and likely food aspiration. Required ventilator from 3/21 to 3/23, subsequently weaned. Treated empirically for pneumonia. Respiratory cultures show Stenotrophomonas which was not treated  - started bactrim to  "treat Stenotrophomonas  - worsening hypoxia and hypercapnia on 4/11. This could be related to trazodone use (attempted 4/10 PM for sleep)   - transferred to ICU and placed on ventilator   - treating potential pneumonia. Known Stenotrophomonas- treating with bactrim. Trach aspirate with GNR- tx with cefepime completed    - does not appear that he has had another MI   - significantly improved. No longer requiring ventilatory support even HS   - Pulmonary is following       Postprocedural pneumothorax  Likely trapped lung      Pleural effusion  Pulmonology consulted who recommended thoracentesis. Performed 4/18 by IR. Pt subsequently developed persistent ptx, but respiratory wise no significant deterioration     -ir consulted for Chest tube eval, but pt refused. PTX seems to be improving. Fluid returning    Fluid studies show borderline transudate although no LDH was drawn on the day of thoracentesis      Retroperitoneal hematoma  RP hematoma discovered after LHC. See "acute blood loss anemia"      NSTEMI (non-ST elevated myocardial infarction)  See "coronary artery disease"      Anxiety  Anxiety waxes and wanes with clinical status. He also has delirium  - Stopped hydroxyzine as this could be contributing to delirium   - Prior bad reaction to ativan  - Attempted trazodone to help sleep and then had worsening encephalopathy and hypoxic/hypercapnic respiratory failure the next morning  - may try mirtazapine if this becomes an issue    Hemoptysis  -See acute blood loss anemia  -this has resolved     Pneumonia  3/22 Respiratory culture: Stenotrophomonas, Achromobacter--> on bactrim x14 days for this  4/11 Respiratory culture: pseudomonas -> completed cefepime course    PEG (percutaneous endoscopic gastrostomy) status  Continue medications via PEG. Does not take oral intake.   -On isosource 1.5 6 days daily with 120cc free water flushes via wife  -Will continue home tube feedings, with nutrition consulted    Tracheostomy " present  See respiratory failure     ACP (advance care planning)  Advance Care Planning     Date: 04/10/2023  Palliative consulted, reviewed documentation. Full code. Needs home palliative follow up upon discharge. Time spent reviewing on 4/10/23= 5 minutes          Severe protein-calorie malnutrition  RD following. On tube feeds.      Secondary malignant neoplasm of cervical lymph node  Noted      Acute blood loss anemia  Pt presented with hemoptysis. Scopes prior to admit and by ENT during admit not showing clear source. Bleeding improved. Pt underwent LHC for NSTEMI. After that procedure he was found to have a large retroperitoneal hematoma. Since starting DAPT he has also had recurrent hemoptysis as well as melanic stools. Trach cuff reinflated 4/2 with improvement in bleeding.  - trend CBC, transfuse prn  - Hgb now stable  - continue asa, plavix     COPD exacerbation  This seems resolved; however, did develop worsening hypoxic/hypercapnic respiratory failure  - coninue nebs      Other hyperlipidemia  -Continue home statin    Primary hypertension  Previously hypotensive, now normotensive  - continue metoprolol     Coronary artery disease involving native coronary artery of native heart with unstable angina pectoris  Troponin elevated when pt was upgraded to the ICU. At that time it was thought to be due to demand, however he had persistent chest pain concerning for ACS. Was started on heparin gtt and tolerated. Subsequently went to [facility] which showed diffuse disease. Pt not a candidate for CABG. He subsequently underwent PCI of LAD and LCx.  - Continue asa, plavix, statin    Squamous cell cancer of tongue  Dx 2021, now s/p total glossectomy, bilateral neck dissection, bilateral cervical facial flaps and anterolateral thigh flap reconstruction of glossectomy defect 1/4/22. Completed adjuvant chemoradiation. Had trach changed by ENT 2 days prior to admit and scope at that time showed no signs of bleeding despite  hemoptysis present on admission.     Carotid stenosis  Continues on asa, plavix, statin       Peripheral vascular disease  Continues on asa, plavix, statin         VTE Risk Mitigation (From admission, onward)         Ordered     heparin (porcine) injection 5,000 Units  Every 8 hours         04/20/23 1325     IP VTE LOW RISK PATIENT  Once         04/11/23 0909     Place sequential compression device  Until discontinued         03/18/23 1620     Place NIKITA hose  Until discontinued         03/18/23 1620                Discharge Planning   NISA: 4/25/2023     Code Status: Full Code   Is the patient medically ready for discharge?:     Reason for patient still in hospital (select all that apply): Patient trending condition, Laboratory test, Treatment, Consult recommendations and Pending disposition  Discharge Plan A: Long-term acute care facility (LTAC)   Discharge Delays: None known at this time              Kelby Sargent MD  Department of Hospital Medicine   [facility]

## 2023-04-22 NOTE — SUBJECTIVE & OBJECTIVE
Interval History: No new complaints. Transferred to the floor    Review of Systems   Constitutional:  Negative for chills and fever.   Respiratory:  Positive for shortness of breath. Negative for cough.    Cardiovascular:  Negative for chest pain and leg swelling.   Gastrointestinal:  Negative for abdominal distention and abdominal pain.   Objective:     Vital Signs (Most Recent):  Temp: 98.3 °F (36.8 °C) (04/22/23 0741)  Pulse: 90 (04/22/23 0912)  Resp: 16 (04/22/23 0912)  BP: 128/60 (04/22/23 0741)  SpO2: 96 % (04/22/23 0912) Vital Signs (24h Range):  Temp:  [97.9 °F (36.6 °C)-98.8 °F (37.1 °C)] 98.3 °F (36.8 °C)  Pulse:  [79-95] 90  Resp:  [16-35] 16  SpO2:  [90 %-100 %] 96 %  BP: (102-137)/(59-81) 128/60     Weight: 62.9 kg (138 lb 10.7 oz)  Body mass index is 21.08 kg/m².    Intake/Output Summary (Last 24 hours) at 4/22/2023 1103  Last data filed at 4/22/2023 0400  Gross per 24 hour   Intake 980 ml   Output 750 ml   Net 230 ml        Physical Exam  Constitutional:       General: He is not in acute distress.     Appearance: He is ill-appearing. He is not toxic-appearing or diaphoretic.   Cardiovascular:      Rate and Rhythm: Normal rate and regular rhythm.      Heart sounds: No murmur heard.    No gallop.   Pulmonary:      Effort: Pulmonary effort is normal. No respiratory distress.      Breath sounds: Rales present. No wheezing.   Abdominal:      General: Bowel sounds are normal. There is no distension.      Palpations: Abdomen is soft.      Tenderness: There is no abdominal tenderness.       Significant Labs: All pertinent labs within the past 24 hours have been reviewed.    Significant Imaging: I have reviewed all pertinent imaging results/findings within the past 24 hours.

## 2023-04-22 NOTE — SUBJECTIVE & OBJECTIVE
Interval History: Feels improved. CXR with re accumulation of pleural fluid    Review of Systems   Constitutional:  Negative for chills and fever.   Respiratory:  Positive for shortness of breath. Negative for cough.    Cardiovascular:  Negative for chest pain and leg swelling.   Gastrointestinal:  Negative for abdominal distention and abdominal pain.   Objective:     Vital Signs (Most Recent):  Temp: 98.3 °F (36.8 °C) (04/22/23 0741)  Pulse: 90 (04/22/23 0912)  Resp: 16 (04/22/23 0912)  BP: 128/60 (04/22/23 0741)  SpO2: 96 % (04/22/23 0912) Vital Signs (24h Range):  Temp:  [97.9 °F (36.6 °C)-98.8 °F (37.1 °C)] 98.3 °F (36.8 °C)  Pulse:  [79-95] 90  Resp:  [16-35] 16  SpO2:  [90 %-100 %] 96 %  BP: (102-137)/(59-81) 128/60     Weight: 62.9 kg (138 lb 10.7 oz)  Body mass index is 21.08 kg/m².    Intake/Output Summary (Last 24 hours) at 4/22/2023 1058  Last data filed at 4/22/2023 0400  Gross per 24 hour   Intake 1380 ml   Output 850 ml   Net 530 ml      Physical Exam  Constitutional:       General: He is not in acute distress.     Appearance: He is ill-appearing. He is not toxic-appearing or diaphoretic.   Cardiovascular:      Rate and Rhythm: Normal rate and regular rhythm.      Heart sounds: No murmur heard.    No gallop.   Pulmonary:      Effort: Pulmonary effort is normal. No respiratory distress.      Breath sounds: Rales present. No wheezing.   Abdominal:      General: Bowel sounds are normal. There is no distension.      Palpations: Abdomen is soft.      Tenderness: There is no abdominal tenderness.       Significant Labs: All pertinent labs within the past 24 hours have been reviewed.    Significant Imaging: I have reviewed all pertinent imaging results/findings within the past 24 hours.

## 2023-04-23 LAB
ALBUMIN SERPL BCP-MCNC: 2 G/DL (ref 3.5–5.2)
ALP SERPL-CCNC: 88 U/L (ref 55–135)
ALT SERPL W/O P-5'-P-CCNC: 18 U/L (ref 10–44)
ANION GAP SERPL CALC-SCNC: 9 MMOL/L (ref 8–16)
AST SERPL-CCNC: 25 U/L (ref 10–40)
BASOPHILS # BLD AUTO: 0.03 K/UL (ref 0–0.2)
BASOPHILS NFR BLD: 0.6 % (ref 0–1.9)
BILIRUB SERPL-MCNC: 0.3 MG/DL (ref 0.1–1)
BUN SERPL-MCNC: 21 MG/DL (ref 8–23)
CALCIUM SERPL-MCNC: 9.2 MG/DL (ref 8.7–10.5)
CHLORIDE SERPL-SCNC: 94 MMOL/L (ref 95–110)
CO2 SERPL-SCNC: 30 MMOL/L (ref 23–29)
CREAT SERPL-MCNC: 1 MG/DL (ref 0.5–1.4)
DIFFERENTIAL METHOD: ABNORMAL
EOSINOPHIL # BLD AUTO: 0.2 K/UL (ref 0–0.5)
EOSINOPHIL NFR BLD: 3.7 % (ref 0–8)
ERYTHROCYTE [DISTWIDTH] IN BLOOD BY AUTOMATED COUNT: 16.2 % (ref 11.5–14.5)
EST. GFR  (NO RACE VARIABLE): >60 ML/MIN/1.73 M^2
GLUCOSE SERPL-MCNC: 98 MG/DL (ref 70–110)
HCT VFR BLD AUTO: 28.7 % (ref 40–54)
HGB BLD-MCNC: 8.9 G/DL (ref 14–18)
IMM GRANULOCYTES # BLD AUTO: 0.03 K/UL (ref 0–0.04)
IMM GRANULOCYTES NFR BLD AUTO: 0.6 % (ref 0–0.5)
LYMPHOCYTES # BLD AUTO: 0.5 K/UL (ref 1–4.8)
LYMPHOCYTES NFR BLD: 10 % (ref 18–48)
MCH RBC QN AUTO: 28.4 PG (ref 27–31)
MCHC RBC AUTO-ENTMCNC: 31 G/DL (ref 32–36)
MCV RBC AUTO: 92 FL (ref 82–98)
MONOCYTES # BLD AUTO: 0.6 K/UL (ref 0.3–1)
MONOCYTES NFR BLD: 12.4 % (ref 4–15)
NEUTROPHILS # BLD AUTO: 3.7 K/UL (ref 1.8–7.7)
NEUTROPHILS NFR BLD: 72.7 % (ref 38–73)
NRBC BLD-RTO: 0 /100 WBC
PLATELET # BLD AUTO: 246 K/UL (ref 150–450)
PMV BLD AUTO: 9.2 FL (ref 9.2–12.9)
POTASSIUM SERPL-SCNC: 5.3 MMOL/L (ref 3.5–5.1)
PROT SERPL-MCNC: 6.5 G/DL (ref 6–8.4)
RBC # BLD AUTO: 3.13 M/UL (ref 4.6–6.2)
SODIUM SERPL-SCNC: 133 MMOL/L (ref 136–145)
WBC # BLD AUTO: 5.08 K/UL (ref 3.9–12.7)

## 2023-04-23 PROCEDURE — A4216 STERILE WATER/SALINE, 10 ML: HCPCS | Performed by: HOSPITALIST

## 2023-04-23 PROCEDURE — 27200966 HC CLOSED SUCTION SYSTEM

## 2023-04-23 PROCEDURE — 36415 COLL VENOUS BLD VENIPUNCTURE: CPT | Performed by: HOSPITALIST

## 2023-04-23 PROCEDURE — 25000003 PHARM REV CODE 250: Performed by: HOSPITALIST

## 2023-04-23 PROCEDURE — 80053 COMPREHEN METABOLIC PANEL: CPT | Performed by: HOSPITALIST

## 2023-04-23 PROCEDURE — 25000242 PHARM REV CODE 250 ALT 637 W/ HCPCS: Performed by: HOSPITALIST

## 2023-04-23 PROCEDURE — 27000221 HC OXYGEN, UP TO 24 HOURS

## 2023-04-23 PROCEDURE — 21400001 HC TELEMETRY ROOM

## 2023-04-23 PROCEDURE — 99900035 HC TECH TIME PER 15 MIN (STAT)

## 2023-04-23 PROCEDURE — 94640 AIRWAY INHALATION TREATMENT: CPT

## 2023-04-23 PROCEDURE — 94664 DEMO&/EVAL PT USE INHALER: CPT

## 2023-04-23 PROCEDURE — 25000242 PHARM REV CODE 250 ALT 637 W/ HCPCS: Performed by: INTERNAL MEDICINE

## 2023-04-23 PROCEDURE — 27000646 HC AEROBIKA DEVICE

## 2023-04-23 PROCEDURE — 94760 N-INVAS EAR/PLS OXIMETRY 1: CPT

## 2023-04-23 PROCEDURE — 94761 N-INVAS EAR/PLS OXIMETRY MLT: CPT

## 2023-04-23 PROCEDURE — 25000003 PHARM REV CODE 250: Performed by: INTERNAL MEDICINE

## 2023-04-23 PROCEDURE — 85025 COMPLETE CBC W/AUTO DIFF WBC: CPT | Performed by: HOSPITALIST

## 2023-04-23 PROCEDURE — 63600175 PHARM REV CODE 636 W HCPCS: Performed by: STUDENT IN AN ORGANIZED HEALTH CARE EDUCATION/TRAINING PROGRAM

## 2023-04-23 PROCEDURE — 25000003 PHARM REV CODE 250: Performed by: STUDENT IN AN ORGANIZED HEALTH CARE EDUCATION/TRAINING PROGRAM

## 2023-04-23 RX ADMIN — SULFAMETHOXAZOLE AND TRIMETHOPRIM 1 TABLET: 800; 160 TABLET ORAL at 08:04

## 2023-04-23 RX ADMIN — METOPROLOL TARTRATE 25 MG: 25 TABLET, FILM COATED ORAL at 10:04

## 2023-04-23 RX ADMIN — HEPARIN SODIUM 5000 UNITS: 5000 INJECTION INTRAVENOUS; SUBCUTANEOUS at 05:04

## 2023-04-23 RX ADMIN — IPRATROPIUM BROMIDE AND ALBUTEROL SULFATE 3 ML: 2.5; .5 SOLUTION RESPIRATORY (INHALATION) at 04:04

## 2023-04-23 RX ADMIN — IPRATROPIUM BROMIDE AND ALBUTEROL SULFATE 3 ML: 2.5; .5 SOLUTION RESPIRATORY (INHALATION) at 12:04

## 2023-04-23 RX ADMIN — ACETYLCYSTEINE 4 ML: 100 SOLUTION ORAL; RESPIRATORY (INHALATION) at 12:04

## 2023-04-23 RX ADMIN — FUROSEMIDE 40 MG: 40 TABLET ORAL at 10:04

## 2023-04-23 RX ADMIN — IPRATROPIUM BROMIDE AND ALBUTEROL SULFATE 3 ML: 2.5; .5 SOLUTION RESPIRATORY (INHALATION) at 08:04

## 2023-04-23 RX ADMIN — Medication 10 ML: at 06:04

## 2023-04-23 RX ADMIN — FAMOTIDINE 20 MG: 10 INJECTION, SOLUTION INTRAVENOUS at 10:04

## 2023-04-23 RX ADMIN — ACETYLCYSTEINE 4 ML: 100 SOLUTION ORAL; RESPIRATORY (INHALATION) at 08:04

## 2023-04-23 RX ADMIN — IPRATROPIUM BROMIDE AND ALBUTEROL SULFATE 3 ML: 2.5; .5 SOLUTION RESPIRATORY (INHALATION) at 07:04

## 2023-04-23 RX ADMIN — FERROUS SULFATE TAB 325 MG (65 MG ELEMENTAL FE) 1 EACH: 325 (65 FE) TAB at 10:04

## 2023-04-23 RX ADMIN — ACETAMINOPHEN 499.51 MG: 160 SOLUTION ORAL at 08:04

## 2023-04-23 RX ADMIN — CLOPIDOGREL BISULFATE 75 MG: 75 TABLET ORAL at 10:04

## 2023-04-23 RX ADMIN — Medication 10 ML: at 03:04

## 2023-04-23 RX ADMIN — CHLORHEXIDINE GLUCONATE 0.12% ORAL RINSE 15 ML: 1.2 LIQUID ORAL at 10:04

## 2023-04-23 RX ADMIN — Medication 10 ML: at 12:04

## 2023-04-23 RX ADMIN — ATORVASTATIN CALCIUM 80 MG: 40 TABLET, FILM COATED ORAL at 10:04

## 2023-04-23 RX ADMIN — SULFAMETHOXAZOLE AND TRIMETHOPRIM 1 TABLET: 800; 160 TABLET ORAL at 10:04

## 2023-04-23 RX ADMIN — CHLORHEXIDINE GLUCONATE 0.12% ORAL RINSE 15 ML: 1.2 LIQUID ORAL at 08:04

## 2023-04-23 RX ADMIN — ACETYLCYSTEINE 4 ML: 100 SOLUTION ORAL; RESPIRATORY (INHALATION) at 03:04

## 2023-04-23 RX ADMIN — HEPARIN SODIUM 5000 UNITS: 5000 INJECTION INTRAVENOUS; SUBCUTANEOUS at 03:04

## 2023-04-23 RX ADMIN — ASPIRIN 81 MG CHEWABLE TABLET 81 MG: 81 TABLET CHEWABLE at 10:04

## 2023-04-23 RX ADMIN — HYDROXYZINE HYDROCHLORIDE 25 MG: 25 TABLET ORAL at 08:04

## 2023-04-23 RX ADMIN — FAMOTIDINE 20 MG: 10 INJECTION, SOLUTION INTRAVENOUS at 08:04

## 2023-04-23 RX ADMIN — MELATONIN TAB 3 MG 9 MG: 3 TAB at 08:04

## 2023-04-23 RX ADMIN — HEPARIN SODIUM 5000 UNITS: 5000 INJECTION INTRAVENOUS; SUBCUTANEOUS at 09:04

## 2023-04-23 NOTE — PROGRESS NOTES
[facility]  Progress Note    Patient Name: Fareed Richard Jr.  MRN: [id number]  Patient Class: IP- Inpatient   Admission Date: 3/18/2023  Length of Stay: 35 days  Attending Physician: Kelby Sargent MD  Primary Care Provider: Kodi Tubbs MD        Subjective:     Principal Problem:Acute on chronic respiratory failure with hypoxia and hypercapnia        HPI:  Fareed Richard Jr. 73 y.o. 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  Per his wife 4 days ago he began to have pink tinged sputum via his trach.  He was seen by his ENT 2 days ago with a scope exam and a trach exchange without evidence of bleeding.  This morning at 3:00 a.m. he developed bright red blood via trach which resolved without intervention.  It is since not recurred.  He takes a daily aspirin.  He receives all medications via G-tube.  His tube feeding regimen consists of 6 cans of Isosource daily with 120 cc free water boluses.  He denies fever chest pain shortness of breath nausea vomiting abdominal pain leg swelling syncope dizziness dysuria melena hematuria hematemesis.    In the ED, hemoglobin 7.6 INR 1.0 BUN 50 creatinine 0.7  troponin negative BRCA negative O positive type and screen chest x-ray without detrimental change hypotensive to 85/54.      Overview/Hospital Course:  74 yo M w squamous CA of tongue s/p glossectomy and reconstructive flap with trach, CAD, chronic hypoxic respiratory failure (on 5L at home) and with peg presented for evaluation of hemoptysis 2 days after tracheostomy change in clinic. Transferred to ICU 3/19 when markedly hypotensive.  Unclear if due to hemorrhage, cardiogenic or septic shock.  Did require PRBC and TXA nebs but didn't seem like sufficient bleeding to cause shock.  Bleeding stopped and ENT following.  CTA shows either significant mucus plugging or  bibasilar pneumonia - pulmonology favored pneumonia.  Trach aspirate with Stenotrophomonas, Achromobacter, and Candida. Also urine culture growing Enterococcus.  He is on broad spectrum antibiotics including bactrim. Developed NSTEMI. Cardiology consulted. [facility] 3/23 which showed distal LM 30%, mild LAD 90% bifurcation lesion with D1, Cx mid 80%, RCA moderate diffuse disease with long 70% and some left to right collateral. All vessels heavily calcified. Not a candidate for CABG but plan for staged PCI. Continued on heparin drip, asa/plavix. Vasopressors weaned. [facility] 3/27 PCI to LAD and LCx. Post operatively he was hemodynamically unstable and anemic. Stat CTA showed RP hematoma near L kidney without acute bleeding. Pt transfused supportively. He improved over the next few days. Stepped down to floor. Remains on trach collar, higher than home O2 requirements. He has delirium as well and poor sleep. Attempted trazodone. The following morning, he was much more lethargic and was transferred to ICU for worsening acute on chronic hypoxic/hypercapnic respiratory failure. This improved after 1 day on the vent.      Trach aspirate with GNR; remains on cefepime in addition to bactrim for prior Stenotrophomonas. Improving at 98%, wean O2. Appears Steno and Achro being effectively treated with bactrim, now growing pseudomonas and has been on cefepime, improved. Abx stopped. Pt had thoracentesis with improvement in breathing, but developed a small ptx. IR consulted for CT placement, but pt refused. No longer requiring ventilatory support at night and PTX improving. Initial plan was for ltac placement, but no long needing vent so does not qualify     -pt/ot reconsulted for placement recommendations  -finishing off bactrim course      Interval History: No new complaints. Oxygen sats worse overnight, improved after suctioning and nebulization    Review of Systems   Constitutional:  Negative for chills and fever.   Respiratory:   Positive for cough and shortness of breath.    Cardiovascular:  Negative for chest pain and leg swelling.   Gastrointestinal:  Negative for abdominal distention and abdominal pain.   Objective:     Vital Signs (Most Recent):  Temp: 98.4 °F (36.9 °C) (04/23/23 1110)  Pulse: 80 (04/23/23 1256)  Resp: 19 (04/23/23 1256)  BP: 110/62 (04/23/23 1110)  SpO2: 98 % (04/23/23 1256) Vital Signs (24h Range):  Temp:  [97.7 °F (36.5 °C)-98.5 °F (36.9 °C)] 98.4 °F (36.9 °C)  Pulse:  [80-92] 80  Resp:  [16-20] 19  SpO2:  [90 %-98 %] 98 %  BP: (101-125)/(57-70) 110/62     Weight: 62.9 kg (138 lb 10.7 oz)  Body mass index is 21.08 kg/m².    Intake/Output Summary (Last 24 hours) at 4/23/2023 1325  Last data filed at 4/23/2023 1200  Gross per 24 hour   Intake 250 ml   Output --   Net 250 ml        Physical Exam  Constitutional:       General: He is not in acute distress.     Appearance: He is ill-appearing. He is not toxic-appearing or diaphoretic.   Cardiovascular:      Rate and Rhythm: Normal rate and regular rhythm.      Heart sounds: No murmur heard.    No gallop.   Pulmonary:      Effort: Pulmonary effort is normal. No respiratory distress.      Breath sounds: Rales present. No wheezing.   Abdominal:      General: Bowel sounds are normal. There is no distension.      Palpations: Abdomen is soft.      Tenderness: There is no abdominal tenderness.       Significant Labs: All pertinent labs within the past 24 hours have been reviewed.    Significant Imaging: I have reviewed all pertinent imaging results/findings within the past 24 hours.      Assessment/Plan:      * Acute on chronic respiratory failure with hypoxia and hypercapnia  At home is on 5L O2 via trach and O2 sats typically in high 80s low 90s. Worsening respiration this admission due to aspiration of blood and likely food aspiration. Required ventilator from 3/21 to 3/23, subsequently weaned. Treated empirically for pneumonia. Respiratory cultures show Stenotrophomonas  "which was not treated  - started bactrim to treat Stenotrophomonas  - worsening hypoxia and hypercapnia on 4/11. This could be related to trazodone use (attempted 4/10 PM for sleep)   - transferred to ICU and placed on ventilator   - treating potential pneumonia. Known Stenotrophomonas- treating with bactrim. Trach aspirate with GNR- tx with cefepime completed    - does not appear that he has had another MI   - significantly improved. No longer requiring ventilatory support even HS   - Pulmonary is following       Postprocedural pneumothorax  Likely trapped lung      Pleural effusion  Pulmonology consulted who recommended thoracentesis. Performed 4/18 by IR. Pt subsequently developed persistent ptx, but respiratory wise no significant deterioration     -ir consulted for Chest tube eval, but pt refused. PTX seems to be improving. Fluid returning    Fluid studies show borderline transudate although no LDH was drawn on the day of thoracentesis      Retroperitoneal hematoma  RP hematoma discovered after LHC. See "acute blood loss anemia"      NSTEMI (non-ST elevated myocardial infarction)  See "coronary artery disease"      Anxiety  Anxiety waxes and wanes with clinical status. He also has delirium  - Stopped hydroxyzine as this could be contributing to delirium   - Prior bad reaction to ativan  - Attempted trazodone to help sleep and then had worsening encephalopathy and hypoxic/hypercapnic respiratory failure the next morning  - may try mirtazapine if this becomes an issue    Hemoptysis  -See acute blood loss anemia  -this has resolved     Pneumonia  3/22 Respiratory culture: Stenotrophomonas, Achromobacter--> on bactrim x14 days for this  4/11 Respiratory culture: pseudomonas -> completed cefepime course    PEG (percutaneous endoscopic gastrostomy) status  Continue medications via PEG. Does not take oral intake.   -On isosource 1.5 6 days daily with 120cc free water flushes via wife  -Will continue home tube " feedings, with nutrition consulted    Tracheostomy present  See respiratory failure     ACP (advance care planning)  Advance Care Planning     Date: 04/10/2023  Palliative consulted, reviewed documentation. Full code. Needs home palliative follow up upon discharge. Time spent reviewing on 4/10/23= 5 minutes          Severe protein-calorie malnutrition  RD following. On tube feeds.      Secondary malignant neoplasm of cervical lymph node  Noted      Acute blood loss anemia  Pt presented with hemoptysis. Scopes prior to admit and by ENT during admit not showing clear source. Bleeding improved. Pt underwent LHC for NSTEMI. After that procedure he was found to have a large retroperitoneal hematoma. Since starting DAPT he has also had recurrent hemoptysis as well as melanic stools. Trach cuff reinflated 4/2 with improvement in bleeding.  - trend CBC, transfuse prn  - Hgb now stable  - continue asa, plavix     COPD exacerbation  This seems resolved; however, did develop worsening hypoxic/hypercapnic respiratory failure  - coninue nebs      Other hyperlipidemia  -Continue home statin    Primary hypertension  Previously hypotensive, now normotensive  - continue metoprolol     Coronary artery disease involving native coronary artery of native heart with unstable angina pectoris  Troponin elevated when pt was upgraded to the ICU. At that time it was thought to be due to demand, however he had persistent chest pain concerning for ACS. Was started on heparin gtt and tolerated. Subsequently went to [facility] which showed diffuse disease. Pt not a candidate for CABG. He subsequently underwent PCI of LAD and LCx.  - Continue asa, plavix, statin    Squamous cell cancer of tongue  Dx 2021, now s/p total glossectomy, bilateral neck dissection, bilateral cervical facial flaps and anterolateral thigh flap reconstruction of glossectomy defect 1/4/22. Completed adjuvant chemoradiation. Had trach changed by ENT 2 days prior to admit and scope  at that time showed no signs of bleeding despite hemoptysis present on admission.     Carotid stenosis  Continues on asa, plavix, statin       Peripheral vascular disease  Continues on asa, plavix, statin         VTE Risk Mitigation (From admission, onward)         Ordered     heparin (porcine) injection 5,000 Units  Every 8 hours         04/20/23 1325     IP VTE LOW RISK PATIENT  Once         04/11/23 0909     Place sequential compression device  Until discontinued         03/18/23 1620     Place NIKITA hose  Until discontinued         03/18/23 1620                Discharge Planning   NISA: 4/25/2023     Code Status: Full Code   Is the patient medically ready for discharge?:     Reason for patient still in hospital (select all that apply): Patient trending condition, Laboratory test, Treatment, Consult recommendations and Pending disposition  Discharge Plan A: Long-term acute care facility (LTAC)   Discharge Delays: None known at this time              Kelby Sargent MD  Department of Hospital Medicine   [facility]

## 2023-04-23 NOTE — ASSESSMENT & PLAN NOTE
Troponin elevated when pt was upgraded to the ICU. At that time it was thought to be due to demand, however he had persistent chest pain concerning for ACS. Was started on heparin gtt and tolerated. Subsequently went to [facility] which showed diffuse disease. Pt not a candidate for CABG. He subsequently underwent PCI of LAD and LCx.  - Continue asa, plavix, statin

## 2023-04-23 NOTE — PLAN OF CARE
Reviewed plan of care with the patient, included but not limited to HAPI.  Continued preventative measures.   Problem: Adult Inpatient Plan of Care  Goal: Plan of Care Review  Outcome: Ongoing, Progressing  Goal: Absence of Hospital-Acquired Illness or Injury  Outcome: Ongoing, Progressing     Problem: Fall Injury Risk  Goal: Absence of Fall and Fall-Related Injury  Outcome: Ongoing, Progressing  Intervention: Promote Injury-Free Environment  Flowsheets (Taken 4/23/2023 1751)  Safety Promotion/Fall Prevention:   assistive device/personal item within reach   bed alarm set   Fall Risk reviewed with patient/family   pulse ox   nonskid shoes/socks when out of bed   room near unit station     Problem: Infection  Goal: Absence of Infection Signs and Symptoms  Outcome: Ongoing, Progressing  Intervention: Prevent or Manage Infection  Flowsheets (Taken 4/23/2023 4113)  Fever Reduction/Comfort Measures:   lightweight clothing   lightweight bedding  Infection Management: aseptic technique maintained

## 2023-04-23 NOTE — NURSING
[facility]  Nurses Note -- 4 Eyes      4/23/2023       Skin assessed on: Q Shift      [x] No Pressure Injuries Present    [x]Prevention Measures Documented    [] Yes LDA  for Pressure Injury Previously documented     [] Yes New Pressure Injury Discovered   [] LDA for New Pressure Injury Added      Attending RN:  Flaco Rubin RN     Second RN:  Yvette WILLIAM RN

## 2023-04-23 NOTE — ASSESSMENT & PLAN NOTE
"See "coronary artery disease"    " August 3, 2021     Patient: Arnold Morris   YOB: 1962   Date of Visit: 8/3/2021       To Whom it May Concern:    Arnold Morris is under my professional care  She was seen in my office on 8/3/2021  She has completed her adjuvant treatment for her early stage breast cancer  Is doing well at present in good jossie  If you have any questions or concerns, please don't hesitate to call           Sincerely,          THERESA Lancaster        CC: No Recipients

## 2023-04-23 NOTE — NURSING
Patient remains AAOX4, denies any acute distress at the present time.  #6 shiley trach in place to trach collar at 10L/40%, suction and emergency airway at bedside, included but not limited to obturator.  Call bell in reach.

## 2023-04-23 NOTE — NURSING
Patient resting in bed with no acute distress noted.  Isosource 1.5 TF infusing per orders @ 40 ml/hr. No residual noted, no distention noted, BS active.  LBM 4/22/2023.  Trach collar remains in place 10L/60%, sats currently noted 98%.  Denies chest pain or SOB at the present time. Obturator, Ambu bag, additional trach supplies, and suction at bedside.  Chlorhexidene mouth swab performed with oral suction.Will continue to monitor.  Re-educated on call bell use.  Door -open, bed alarm set.

## 2023-04-23 NOTE — SUBJECTIVE & OBJECTIVE
Interval History: No new complaints. Oxygen sats worse overnight, improved after suctioning and nebulization    Review of Systems   Constitutional:  Negative for chills and fever.   Respiratory:  Positive for cough and shortness of breath.    Cardiovascular:  Negative for chest pain and leg swelling.   Gastrointestinal:  Negative for abdominal distention and abdominal pain.   Objective:     Vital Signs (Most Recent):  Temp: 98.4 °F (36.9 °C) (04/23/23 1110)  Pulse: 80 (04/23/23 1256)  Resp: 19 (04/23/23 1256)  BP: 110/62 (04/23/23 1110)  SpO2: 98 % (04/23/23 1256) Vital Signs (24h Range):  Temp:  [97.7 °F (36.5 °C)-98.5 °F (36.9 °C)] 98.4 °F (36.9 °C)  Pulse:  [80-92] 80  Resp:  [16-20] 19  SpO2:  [90 %-98 %] 98 %  BP: (101-125)/(57-70) 110/62     Weight: 62.9 kg (138 lb 10.7 oz)  Body mass index is 21.08 kg/m².    Intake/Output Summary (Last 24 hours) at 4/23/2023 1325  Last data filed at 4/23/2023 1200  Gross per 24 hour   Intake 250 ml   Output --   Net 250 ml        Physical Exam  Constitutional:       General: He is not in acute distress.     Appearance: He is ill-appearing. He is not toxic-appearing or diaphoretic.   Cardiovascular:      Rate and Rhythm: Normal rate and regular rhythm.      Heart sounds: No murmur heard.    No gallop.   Pulmonary:      Effort: Pulmonary effort is normal. No respiratory distress.      Breath sounds: Rales present. No wheezing.   Abdominal:      General: Bowel sounds are normal. There is no distension.      Palpations: Abdomen is soft.      Tenderness: There is no abdominal tenderness.       Significant Labs: All pertinent labs within the past 24 hours have been reviewed.    Significant Imaging: I have reviewed all pertinent imaging results/findings within the past 24 hours.

## 2023-04-24 LAB
ALBUMIN SERPL BCP-MCNC: 2 G/DL (ref 3.5–5.2)
ALP SERPL-CCNC: 91 U/L (ref 55–135)
ALT SERPL W/O P-5'-P-CCNC: 18 U/L (ref 10–44)
ANION GAP SERPL CALC-SCNC: 8 MMOL/L (ref 8–16)
AST SERPL-CCNC: 25 U/L (ref 10–40)
BASOPHILS # BLD AUTO: 0.03 K/UL (ref 0–0.2)
BASOPHILS NFR BLD: 0.7 % (ref 0–1.9)
BILIRUB SERPL-MCNC: 0.2 MG/DL (ref 0.1–1)
BUN SERPL-MCNC: 25 MG/DL (ref 8–23)
CALCIUM SERPL-MCNC: 8.6 MG/DL (ref 8.7–10.5)
CHLORIDE SERPL-SCNC: 94 MMOL/L (ref 95–110)
CO2 SERPL-SCNC: 31 MMOL/L (ref 23–29)
CREAT SERPL-MCNC: 1.1 MG/DL (ref 0.5–1.4)
DIFFERENTIAL METHOD: ABNORMAL
EOSINOPHIL # BLD AUTO: 0.2 K/UL (ref 0–0.5)
EOSINOPHIL NFR BLD: 4.2 % (ref 0–8)
ERYTHROCYTE [DISTWIDTH] IN BLOOD BY AUTOMATED COUNT: 16.3 % (ref 11.5–14.5)
EST. GFR  (NO RACE VARIABLE): >60 ML/MIN/1.73 M^2
GLUCOSE SERPL-MCNC: 126 MG/DL (ref 70–110)
HCT VFR BLD AUTO: 28.2 % (ref 40–54)
HGB BLD-MCNC: 9 G/DL (ref 14–18)
IMM GRANULOCYTES # BLD AUTO: 0.02 K/UL (ref 0–0.04)
IMM GRANULOCYTES NFR BLD AUTO: 0.4 % (ref 0–0.5)
LYMPHOCYTES # BLD AUTO: 0.5 K/UL (ref 1–4.8)
LYMPHOCYTES NFR BLD: 11.1 % (ref 18–48)
MCH RBC QN AUTO: 29.1 PG (ref 27–31)
MCHC RBC AUTO-ENTMCNC: 31.9 G/DL (ref 32–36)
MCV RBC AUTO: 91 FL (ref 82–98)
MONOCYTES # BLD AUTO: 0.5 K/UL (ref 0.3–1)
MONOCYTES NFR BLD: 11.8 % (ref 4–15)
NEUTROPHILS # BLD AUTO: 3.2 K/UL (ref 1.8–7.7)
NEUTROPHILS NFR BLD: 71.8 % (ref 38–73)
NRBC BLD-RTO: 0 /100 WBC
PLATELET # BLD AUTO: 256 K/UL (ref 150–450)
PMV BLD AUTO: 9.9 FL (ref 9.2–12.9)
POTASSIUM SERPL-SCNC: 4.1 MMOL/L (ref 3.5–5.1)
PROT SERPL-MCNC: 6.2 G/DL (ref 6–8.4)
RBC # BLD AUTO: 3.09 M/UL (ref 4.6–6.2)
SODIUM SERPL-SCNC: 133 MMOL/L (ref 136–145)
WBC # BLD AUTO: 4.5 K/UL (ref 3.9–12.7)

## 2023-04-24 PROCEDURE — 27200966 HC CLOSED SUCTION SYSTEM

## 2023-04-24 PROCEDURE — 25000003 PHARM REV CODE 250: Performed by: HOSPITALIST

## 2023-04-24 PROCEDURE — 27000646 HC AEROBIKA DEVICE

## 2023-04-24 PROCEDURE — 36415 COLL VENOUS BLD VENIPUNCTURE: CPT | Performed by: HOSPITALIST

## 2023-04-24 PROCEDURE — 25000003 PHARM REV CODE 250: Performed by: INTERNAL MEDICINE

## 2023-04-24 PROCEDURE — 25000242 PHARM REV CODE 250 ALT 637 W/ HCPCS: Performed by: HOSPITALIST

## 2023-04-24 PROCEDURE — 94640 AIRWAY INHALATION TREATMENT: CPT

## 2023-04-24 PROCEDURE — 85025 COMPLETE CBC W/AUTO DIFF WBC: CPT | Performed by: HOSPITALIST

## 2023-04-24 PROCEDURE — 94760 N-INVAS EAR/PLS OXIMETRY 1: CPT

## 2023-04-24 PROCEDURE — 97110 THERAPEUTIC EXERCISES: CPT | Mod: CQ

## 2023-04-24 PROCEDURE — 63600175 PHARM REV CODE 636 W HCPCS: Performed by: STUDENT IN AN ORGANIZED HEALTH CARE EDUCATION/TRAINING PROGRAM

## 2023-04-24 PROCEDURE — 27100171 HC OXYGEN HIGH FLOW UP TO 24 HOURS

## 2023-04-24 PROCEDURE — 25000242 PHARM REV CODE 250 ALT 637 W/ HCPCS: Performed by: INTERNAL MEDICINE

## 2023-04-24 PROCEDURE — 97530 THERAPEUTIC ACTIVITIES: CPT | Mod: CQ

## 2023-04-24 PROCEDURE — A4216 STERILE WATER/SALINE, 10 ML: HCPCS | Performed by: HOSPITALIST

## 2023-04-24 PROCEDURE — 21400001 HC TELEMETRY ROOM

## 2023-04-24 PROCEDURE — 94664 DEMO&/EVAL PT USE INHALER: CPT

## 2023-04-24 PROCEDURE — 99900035 HC TECH TIME PER 15 MIN (STAT)

## 2023-04-24 PROCEDURE — 80053 COMPREHEN METABOLIC PANEL: CPT | Performed by: HOSPITALIST

## 2023-04-24 PROCEDURE — 97530 THERAPEUTIC ACTIVITIES: CPT

## 2023-04-24 RX ADMIN — IPRATROPIUM BROMIDE AND ALBUTEROL SULFATE 3 ML: 2.5; .5 SOLUTION RESPIRATORY (INHALATION) at 01:04

## 2023-04-24 RX ADMIN — CLOPIDOGREL BISULFATE 75 MG: 75 TABLET ORAL at 08:04

## 2023-04-24 RX ADMIN — METOPROLOL TARTRATE 25 MG: 25 TABLET, FILM COATED ORAL at 09:04

## 2023-04-24 RX ADMIN — IPRATROPIUM BROMIDE AND ALBUTEROL SULFATE 3 ML: 2.5; .5 SOLUTION RESPIRATORY (INHALATION) at 12:04

## 2023-04-24 RX ADMIN — HEPARIN SODIUM 5000 UNITS: 5000 INJECTION INTRAVENOUS; SUBCUTANEOUS at 06:04

## 2023-04-24 RX ADMIN — FUROSEMIDE 40 MG: 40 TABLET ORAL at 08:04

## 2023-04-24 RX ADMIN — ACETYLCYSTEINE 4 ML: 100 SOLUTION ORAL; RESPIRATORY (INHALATION) at 12:04

## 2023-04-24 RX ADMIN — SENNOSIDES AND DOCUSATE SODIUM 2 TABLET: 50; 8.6 TABLET ORAL at 09:04

## 2023-04-24 RX ADMIN — METOPROLOL TARTRATE 25 MG: 25 TABLET, FILM COATED ORAL at 08:04

## 2023-04-24 RX ADMIN — Medication 10 ML: at 06:04

## 2023-04-24 RX ADMIN — IPRATROPIUM BROMIDE AND ALBUTEROL SULFATE 3 ML: 2.5; .5 SOLUTION RESPIRATORY (INHALATION) at 03:04

## 2023-04-24 RX ADMIN — ASPIRIN 81 MG CHEWABLE TABLET 81 MG: 81 TABLET CHEWABLE at 08:04

## 2023-04-24 RX ADMIN — IPRATROPIUM BROMIDE AND ALBUTEROL SULFATE 3 ML: 2.5; .5 SOLUTION RESPIRATORY (INHALATION) at 05:04

## 2023-04-24 RX ADMIN — CHLORHEXIDINE GLUCONATE 0.12% ORAL RINSE 15 ML: 1.2 LIQUID ORAL at 09:04

## 2023-04-24 RX ADMIN — Medication 10 ML: at 12:04

## 2023-04-24 RX ADMIN — HEPARIN SODIUM 5000 UNITS: 5000 INJECTION INTRAVENOUS; SUBCUTANEOUS at 02:04

## 2023-04-24 RX ADMIN — FAMOTIDINE 20 MG: 10 INJECTION, SOLUTION INTRAVENOUS at 08:04

## 2023-04-24 RX ADMIN — FAMOTIDINE 20 MG: 10 INJECTION, SOLUTION INTRAVENOUS at 09:04

## 2023-04-24 RX ADMIN — ACETYLCYSTEINE 4 ML: 100 SOLUTION ORAL; RESPIRATORY (INHALATION) at 11:04

## 2023-04-24 RX ADMIN — MELATONIN TAB 3 MG 9 MG: 3 TAB at 09:04

## 2023-04-24 RX ADMIN — HEPARIN SODIUM 5000 UNITS: 5000 INJECTION INTRAVENOUS; SUBCUTANEOUS at 09:04

## 2023-04-24 RX ADMIN — SENNOSIDES AND DOCUSATE SODIUM 2 TABLET: 50; 8.6 TABLET ORAL at 08:04

## 2023-04-24 RX ADMIN — IPRATROPIUM BROMIDE AND ALBUTEROL SULFATE 3 ML: 2.5; .5 SOLUTION RESPIRATORY (INHALATION) at 11:04

## 2023-04-24 RX ADMIN — ACETYLCYSTEINE 4 ML: 100 SOLUTION ORAL; RESPIRATORY (INHALATION) at 05:04

## 2023-04-24 RX ADMIN — IPRATROPIUM BROMIDE AND ALBUTEROL SULFATE 3 ML: 2.5; .5 SOLUTION RESPIRATORY (INHALATION) at 08:04

## 2023-04-24 RX ADMIN — ACETYLCYSTEINE 4 ML: 100 SOLUTION ORAL; RESPIRATORY (INHALATION) at 08:04

## 2023-04-24 RX ADMIN — CHLORHEXIDINE GLUCONATE 0.12% ORAL RINSE 15 ML: 1.2 LIQUID ORAL at 08:04

## 2023-04-24 RX ADMIN — ATORVASTATIN CALCIUM 80 MG: 40 TABLET, FILM COATED ORAL at 08:04

## 2023-04-24 RX ADMIN — FERROUS SULFATE TAB 325 MG (65 MG ELEMENTAL FE) 1 EACH: 325 (65 FE) TAB at 08:04

## 2023-04-24 NOTE — PLAN OF CARE
DANIELE spoke with María (DAVIS) re: patient's disposition. María (DAVIS) reviewed patient's chart with DANIELE and noticed that patient's Functional Mobility is modified independent and really does not meet SNF criteria.

## 2023-04-24 NOTE — PLAN OF CARE
Problem: Occupational Therapy  Goal: Occupational Therapy Goal  Description: Goals to be met by: 5/3/23     Patient will increase functional independence with ADLs by performing:    LE Dressing with Modified [place] and Supervision.  Grooming while standing at sink with Stand-by Assistance and seated rest breaks as needed.   Toileting from toilet with Stand-by Assistance as needed for hygiene and clothing management.   Step transfer with Modified [place].   Toilet transfer to toilet with Modified [place].   Upper extremity exercise program x10 reps per handout, with assistance as needed.  Implementation of PLB and energy conservation strategies for safe performance w/ ADLs and functional mobility.     Outcome: Ongoing, Progressing   Patient t/f from chair to bed with stand pivot and did sit to supine t/f with no assistance needed except to manage multiple lines.

## 2023-04-24 NOTE — PLAN OF CARE
Recommendations    1. Continue with TF @ goal of 40 ml/hr -> consider increasing to 45 ml/hr + 300 ml flush q 6 hr  ( Provides  1620 kcal ( 86% EEN), 73 g protein ( 100% EPN), 729 ml free water)  -If bolus needed for home (typically give 6 cans): At least 5 cans of Isosource 1.5 daily +  ml flush before and after each bolus   ( provides 1875 kcal ( 100% EEN), 85 g protein ( 100% EPN), and 950 ml free water)    2. Advance diet as tolerated and appropriate.   3. Weigh weekly    Goals: 1. Diet advancement by RD follow up. 2. Tolerating TF @ goal by f/u  Nutrition Goal Status: not able to meet/ new  Communication of RD Recs: POC, sticky note

## 2023-04-24 NOTE — PT/OT/SLP PROGRESS
Occupational Therapy   Treatment    Name: Fareed Richard Jr.  MRN: [id number]  Admitting Diagnosis:  Acute on chronic respiratory failure with hypoxia and hypercapnia  28 Days Post-Op    Recommendations:     Discharge Recommendations:  (TBD pending ongoing assessment.)  Discharge Equipment Recommendations:   (TBD pending ongoing assessment.)  Barriers to discharge:       Assessment:     Fareed Richard Jr. is a 73 y.o. male with a medical diagnosis of Acute on chronic respiratory failure with hypoxia and hypercapnia.  He presents up in chair with lines intact. He remains on 10L oxygen, but did not exhibit SOB before transferring from chair to bed. Performance deficits affecting function are weakness, impaired endurance, impaired cardiopulmonary response to activity.     Rehab Prognosis:  Fair; patient would benefit from acute skilled OT services to address these deficits and reach maximum level of function.       Plan:     Patient to be seen 3 x/week to address the above listed problems via self-care/home management, therapeutic activities, therapeutic exercises  Plan of Care Expires: 05/03/23  Plan of Care Reviewed with: patient    Subjective     Chief Complaint: fatigue   Patient/Family Comments/goals: none named today   Pain/Comfort:  Pain Rating 1: 0/10    Objective:     Communicated with: RNMariella, prior to session.  Patient found up in chair with Tracheostomy, PEG Tube, flores catheter (suction) upon OT entry to room.    General Precautions: Standard, NPO, respiratory    Orthopedic Precautions:N/A  Braces: N/A  Respiratory Status: 10L oxygen      Occupational Performance:     Bed Mobility:    Patient completed Sit to Supine with stand by assistance primarily to assist with transfer of lines and catheter from chair to bed     Functional Mobility/Transfers:  Patient completed Sit <> Stand Transfer with stand by assistance  with  no assistive device   Patient completed Bed <> Chair Transfer using Stand Pivot  technique with stand by assistance with no assistive device  Functional Mobility: Patient took 2-3 steps from chair to bed with no AD/DME.     Activities of Daily Living:  None done today due to fatigue once returned to bed from chair       [facility] 6 Click ADL:      Treatment & Education:  Patient t/f from chair to bed with all lines intact and lay down sit to supine with assistance only needed to safely move all lines and catheter from chair to bed.     Patient left HOB elevated with all lines intact, call button in reach, and RN notified    GOALS:   Multidisciplinary Problems       Occupational Therapy Goals          Problem: Occupational Therapy    Goal Priority Disciplines Outcome Interventions   Occupational Therapy Goal     OT, PT/OT Ongoing, Progressing    Description: Goals to be met by: 5/3/23     Patient will increase functional independence with ADLs by performing:    LE Dressing with Modified [place] and Supervision.  Grooming while standing at sink with Stand-by Assistance and seated rest breaks as needed.   Toileting from toilet with Stand-by Assistance as needed for hygiene and clothing management.   Step transfer with Modified [place].   Toilet transfer to toilet with Modified [place].   Upper extremity exercise program x10 reps per handout, with assistance as needed.  Implementation of PLB and energy conservation strategies for safe performance w/ ADLs and functional mobility.                          Time Tracking:     OT Date of Treatment: 04/24/23  OT Start Time: 0224  OT Stop Time: 0240  OT Total Time (min): 16 min    Billable Minutes:Therapeutic Activity 16    OT/NELLY: OT          4/24/2023

## 2023-04-24 NOTE — PROGRESS NOTES
[facility] Medicine  Progress Note    Patient Name: Fareed Richard Jr.  MRN: [id number]  Patient Class: IP- Inpatient   Admission Date: 3/18/2023  Length of Stay: 36 days  Attending Physician: Kelby Sargent MD  Primary Care Provider: Kodi Tubbs MD        Subjective:     Principal Problem:Acute on chronic respiratory failure with hypoxia and hypercapnia        HPI:  Fareed Richard Jr. 73 y.o. 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  Per his wife 4 days ago he began to have pink tinged sputum via his trach.  He was seen by his ENT 2 days ago with a scope exam and a trach exchange without evidence of bleeding.  This morning at 3:00 a.m. he developed bright red blood via trach which resolved without intervention.  It is since not recurred.  He takes a daily aspirin.  He receives all medications via G-tube.  His tube feeding regimen consists of 6 cans of Isosource daily with 120 cc free water boluses.  He denies fever chest pain shortness of breath nausea vomiting abdominal pain leg swelling syncope dizziness dysuria melena hematuria hematemesis.    In the ED, hemoglobin 7.6 INR 1.0 BUN 50 creatinine 0.7  troponin negative BRCA negative O positive type and screen chest x-ray without detrimental change hypotensive to 85/54.      Overview/Hospital Course:  74 yo M w squamous CA of tongue s/p glossectomy and reconstructive flap with trach, CAD, chronic hypoxic respiratory failure (on 5L at home) and with peg presented for evaluation of hemoptysis 2 days after tracheostomy change in clinic. Transferred to ICU 3/19 when markedly hypotensive.  Unclear if due to hemorrhage, cardiogenic or septic shock.  Did require PRBC and TXA nebs but didn't seem like sufficient bleeding to cause shock.  Bleeding stopped and ENT following.  CTA shows either significant mucus plugging or  bibasilar pneumonia - pulmonology favored pneumonia.  Trach aspirate with Stenotrophomonas, Achromobacter, and Candida. Also urine culture growing Enterococcus.  He is on broad spectrum antibiotics including bactrim. Developed NSTEMI. Cardiology consulted. [facility] 3/23 which showed distal LM 30%, mild LAD 90% bifurcation lesion with D1, Cx mid 80%, RCA moderate diffuse disease with long 70% and some left to right collateral. All vessels heavily calcified. Not a candidate for CABG but plan for staged PCI. Continued on heparin drip, asa/plavix. Vasopressors weaned. [facility] 3/27 PCI to LAD and LCx. Post operatively he was hemodynamically unstable and anemic. Stat CTA showed RP hematoma near L kidney without acute bleeding. Pt transfused supportively. He improved over the next few days. Stepped down to floor. Remains on trach collar, higher than home O2 requirements. He has delirium as well and poor sleep. Attempted trazodone. The following morning, he was much more lethargic and was transferred to ICU for worsening acute on chronic hypoxic/hypercapnic respiratory failure. This improved after 1 day on the vent.      Trach aspirate with GNR; remains on cefepime in addition to bactrim for prior Stenotrophomonas. Improving at 98%, wean O2. Appears Steno and Achro being effectively treated with bactrim, now growing pseudomonas and has been on cefepime, improved. Abx stopped. Pt had thoracentesis with improvement in breathing, but developed a small ptx. IR consulted for CT placement, but pt refused. No longer requiring ventilatory support at night and PTX improving. Initial plan was for ltac placement, but no long needing vent so does not qualify     -pt/ot reconsulted for placement recommendations  -finishing off bactrim course      Interval History: No new complaints. Stable    Review of Systems   Constitutional:  Negative for chills and fever.   Respiratory:  Positive for cough and shortness of breath.    Cardiovascular:   Negative for chest pain and leg swelling.   Gastrointestinal:  Negative for abdominal distention and abdominal pain.   Objective:     Vital Signs (Most Recent):  Temp: 97.8 °F (36.6 °C) (04/24/23 0732)  Pulse: 82 (04/24/23 0854)  Resp: 16 (04/24/23 0804)  BP: (!) 107/59 (04/24/23 0854)  SpO2: 98 % (04/24/23 0804) Vital Signs (24h Range):  Temp:  [97.5 °F (36.4 °C)-98.5 °F (36.9 °C)] 97.8 °F (36.6 °C)  Pulse:  [79-96] 82  Resp:  [16-20] 16  SpO2:  [93 %-100 %] 98 %  BP: (100-116)/(56-62) 107/59     Weight: 62.9 kg (138 lb 10.7 oz)  Body mass index is 21.08 kg/m².    Intake/Output Summary (Last 24 hours) at 4/24/2023 1035  Last data filed at 4/23/2023 2000  Gross per 24 hour   Intake 900 ml   Output --   Net 900 ml        Physical Exam  Constitutional:       General: He is not in acute distress.     Appearance: He is ill-appearing. He is not toxic-appearing or diaphoretic.   Cardiovascular:      Rate and Rhythm: Normal rate and regular rhythm.      Heart sounds: No murmur heard.    No gallop.   Pulmonary:      Effort: Pulmonary effort is normal. No respiratory distress.      Breath sounds: Rales present. No wheezing.   Abdominal:      General: Bowel sounds are normal. There is no distension.      Palpations: Abdomen is soft.      Tenderness: There is no abdominal tenderness.       Significant Labs: All pertinent labs within the past 24 hours have been reviewed.    Significant Imaging: I have reviewed all pertinent imaging results/findings within the past 24 hours.      Assessment/Plan:      * Acute on chronic respiratory failure with hypoxia and hypercapnia  At home is on 5L O2 via trach and O2 sats typically in high 80s low 90s. Worsening respiration this admission due to aspiration of blood and likely food aspiration. Required ventilator from 3/21 to 3/23, subsequently weaned. Treated empirically for pneumonia. Respiratory cultures show Stenotrophomonas which was not treated  - started bactrim to treat  "Stenotrophomonas  - worsening hypoxia and hypercapnia on 4/11. This could be related to trazodone use (attempted 4/10 PM for sleep)   - transferred to ICU and placed on ventilator   - treating potential pneumonia. Known Stenotrophomonas- treating with bactrim. Trach aspirate with GNR- tx with cefepime completed    - does not appear that he has had another MI   - significantly improved. No longer requiring ventilatory support even HS   - received thoracentesis w/ subsequent re accumulation of fluid   - Pulmonary is following       Postprocedural pneumothorax  Likely trapped lung      Pleural effusion  Pulmonology consulted who recommended thoracentesis. Performed 4/18 by IR. Pt subsequently developed persistent ptx, but respiratory wise no significant deterioration     -ir consulted for Chest tube eval, but pt refused. PTX seems to be improving. Fluid returning    Fluid studies show borderline transudate although no LDH was drawn on the day of thoracentesis      Retroperitoneal hematoma  RP hematoma discovered after LHC. See "acute blood loss anemia"      NSTEMI (non-ST elevated myocardial infarction)  See "coronary artery disease"      Anxiety  Anxiety waxes and wanes with clinical status. He also has delirium  - Stopped hydroxyzine as this could be contributing to delirium   - Prior bad reaction to ativan  - Attempted trazodone to help sleep and then had worsening encephalopathy and hypoxic/hypercapnic respiratory failure the next morning  - may try mirtazapine if this becomes an issue    Hemoptysis  -See acute blood loss anemia  -this has resolved     Pneumonia  3/22 Respiratory culture: Stenotrophomonas, Achromobacter--> on bactrim x14 days for this  4/11 Respiratory culture: pseudomonas -> completed cefepime course    PEG (percutaneous endoscopic gastrostomy) status  Continue medications via PEG. Does not take oral intake.   -On isosource 1.5 6 days daily with 120cc free water flushes via wife  -Will continue " home tube feedings, with nutrition consulted    Tracheostomy present  See respiratory failure     ACP (advance care planning)  Advance Care Planning     Date: 04/10/2023  Palliative consulted, reviewed documentation. Full code. Needs home palliative follow up upon discharge. Time spent reviewing on 4/10/23= 5 minutes          Severe protein-calorie malnutrition  RD following. On tube feeds.      Secondary malignant neoplasm of cervical lymph node  Noted      Acute blood loss anemia  Pt presented with hemoptysis. Scopes prior to admit and by ENT during admit not showing clear source. Bleeding improved. Pt underwent LHC for NSTEMI. After that procedure he was found to have a large retroperitoneal hematoma. Since starting DAPT he has also had recurrent hemoptysis as well as melanic stools. Trach cuff reinflated 4/2 with improvement in bleeding.  - trend CBC, transfuse prn  - Hgb now stable  - continue asa, plavix     COPD exacerbation  This seems resolved; however, did develop worsening hypoxic/hypercapnic respiratory failure  - coninue nebs      Other hyperlipidemia  -Continue home statin    Primary hypertension  Previously hypotensive, now normotensive  - continue metoprolol     Coronary artery disease involving native coronary artery of native heart with unstable angina pectoris  Troponin elevated when pt was upgraded to the ICU. At that time it was thought to be due to demand, however he had persistent chest pain concerning for ACS. Was started on heparin gtt and tolerated. Subsequently went to [facility] which showed diffuse disease. Pt not a candidate for CABG. He subsequently underwent PCI of LAD and LCx.  - Continue asa, plavix, statin    Squamous cell cancer of tongue  Dx 2021, now s/p total glossectomy, bilateral neck dissection, bilateral cervical facial flaps and anterolateral thigh flap reconstruction of glossectomy defect 1/4/22. Completed adjuvant chemoradiation. Had trach changed by ENT 2 days prior to admit  and scope at that time showed no signs of bleeding despite hemoptysis present on admission.     Carotid stenosis  Continues on asa, plavix, statin       Peripheral vascular disease  Continues on asa, plavix, statin         VTE Risk Mitigation (From admission, onward)         Ordered     heparin (porcine) injection 5,000 Units  Every 8 hours         04/20/23 1325     IP VTE LOW RISK PATIENT  Once         04/11/23 0909     Place sequential compression device  Until discontinued         03/18/23 1620     Place NIKITA hose  Until discontinued         03/18/23 1620                Discharge Planning   NISA: 4/25/2023     Code Status: Full Code   Is the patient medically ready for discharge?:     Reason for patient still in hospital (select all that apply): Patient trending condition, Laboratory test, Treatment, Consult recommendations and Pending disposition  Discharge Plan A: Long-term acute care facility (LTAC)   Discharge Delays: None known at this time              Kelby Sargent MD  Department of Hospital Medicine   [facility]

## 2023-04-24 NOTE — SUBJECTIVE & OBJECTIVE
Interval History: No new complaints. Stable    Review of Systems   Constitutional:  Negative for chills and fever.   Respiratory:  Positive for cough and shortness of breath.    Cardiovascular:  Negative for chest pain and leg swelling.   Gastrointestinal:  Negative for abdominal distention and abdominal pain.   Objective:     Vital Signs (Most Recent):  Temp: 97.8 °F (36.6 °C) (04/24/23 0732)  Pulse: 82 (04/24/23 0854)  Resp: 16 (04/24/23 0804)  BP: (!) 107/59 (04/24/23 0854)  SpO2: 98 % (04/24/23 0804) Vital Signs (24h Range):  Temp:  [97.5 °F (36.4 °C)-98.5 °F (36.9 °C)] 97.8 °F (36.6 °C)  Pulse:  [79-96] 82  Resp:  [16-20] 16  SpO2:  [93 %-100 %] 98 %  BP: (100-116)/(56-62) 107/59     Weight: 62.9 kg (138 lb 10.7 oz)  Body mass index is 21.08 kg/m².    Intake/Output Summary (Last 24 hours) at 4/24/2023 1035  Last data filed at 4/23/2023 2000  Gross per 24 hour   Intake 900 ml   Output --   Net 900 ml        Physical Exam  Constitutional:       General: He is not in acute distress.     Appearance: He is ill-appearing. He is not toxic-appearing or diaphoretic.   Cardiovascular:      Rate and Rhythm: Normal rate and regular rhythm.      Heart sounds: No murmur heard.    No gallop.   Pulmonary:      Effort: Pulmonary effort is normal. No respiratory distress.      Breath sounds: Rales present. No wheezing.   Abdominal:      General: Bowel sounds are normal. There is no distension.      Palpations: Abdomen is soft.      Tenderness: There is no abdominal tenderness.       Significant Labs: All pertinent labs within the past 24 hours have been reviewed.    Significant Imaging: I have reviewed all pertinent imaging results/findings within the past 24 hours.

## 2023-04-24 NOTE — NURSING
[facility]  Nurses Note -- 4 Eyes      4/24/2023       Skin assessed on: Q Shift      [x] No Pressure Injuries Present    [x]Prevention Measures Documented    [] Yes LDA  for Pressure Injury Previously documented     [] Yes New Pressure Injury Discovered   [] LDA for New Pressure Injury Added      Attending RN:  Yvette Islas RN     Second RN:  Flaco Rubin RN

## 2023-04-24 NOTE — PROGRESS NOTES
[facility] - Intensive Care  Adult Nutrition  Progress Note    SUMMARY       Recommendations    1. Continue with TF @ goal of 40 ml/hr -> consider increasing to 45 ml/hr + 300 ml flush q 6 hr  ( Provides  1620 kcal ( 86% EEN), 73 g protein ( 100% EPN), 729 ml free water)  -If bolus needed for home (typically give 6 cans): At least 5 cans of Isosource 1.5 daily +  ml flush before and after each bolus   ( provides 1875 kcal ( 100% EEN), 85 g protein ( 100% EPN), and 950 ml free water)    2. Advance diet as tolerated and appropriate.   3. Weigh weekly    Goals: 1. Diet advancement by RD follow up. 2. Tolerating TF @ goal by f/u  Nutrition Goal Status: not able to meet/ new  Communication of RD Recs: POC, sticky note    Assessment and Plan    Nutrition Problem  Inadequate PO intake     Related to (etiology):   Cancer, trouble swallowing     Signs and Symptoms (as evidenced by):   NPO , PEG TF PTA     Interventions/Recommendations (treatment strategy):  Collaboration with other providers  Enteral nutrition therapy     Nutrition Diagnosis Status:   Continues    Reason for Assessment    Reason For Assessment: RD follow-up  Diagnosis:  (acute blood loss anemia)  Relevant Medical History: cancer, COPD, hyperlipidemia  Interdisciplinary Rounds: did not attend-remote    General Information Comments: RD follow up. Pt diet advanced to cardiac diet but is now NPO. Pt has no recent weight changes. If pt remains NPO, consider previous TF recs. Previous recs of TF isosource 1.5. Continue to advance TF to GR 40 mLhr as tolerated. NFPE on 4/10 shows no signs of malnutrition at this time.  4/24/23 Pt tolerating Tf @ goal. Rec. Increase rate for wt maintenance/gain. At home , he does not take food PO, noted to receive 6 cartons of isosouce daily + 120 ml free water per family.      Nutrition Discharge Planning: Bolus TF above. Will need > 90 days. OK to substitute equivalent product if needed.    Nutrition Risk Screen    Nutrition  "Risk Screen: tube feeding or parenteral nutrition    Nutrition/Diet History    Spiritual, Cultural Beliefs, Islam Practices, Values that Affect Care: no  Food Allergies: NKFA  Factors affecting PO intakes: dysphagia    Anthropometrics    Temp: 98.4 °F (36.9 °C)  Height Method: Stated  Height: 5' 8"  Height (inches): 68 in  Weight Method: Bed Scale  Weight: 62.9 kg (138 lb 10.7 oz)  Weight (lb): 138.67 lb  Ideal Body Weight (IBW), Male: 154 lb  BMI (Calculated): 22.4  BMI Grade: 18.5-24.9 - normal       Lab/Procedures/Meds    Pertinent Labs Reviewed: reviewed  BMP  Lab Results   Component Value Date     (L) 04/24/2023    K 4.1 04/24/2023    CL 94 (L) 04/24/2023    CO2 31 (H) 04/24/2023    BUN 25 (H) 04/24/2023    CREATININE 1.1 04/24/2023    CALCIUM 8.6 (L) 04/24/2023    ANIONGAP 8 04/24/2023    EGFRNORACEVR >60 04/24/2023     No results for input(s): POCTGLUCOSE in the last 24 hours.  Lab Results   Component Value Date    ALBUMIN 2.0 (L) 04/24/2023       Pertinent Medications Reviewed: reviewed  Insulin, zofran, statin, iron, lasix, senna      Estimated/Assessed Needs    Weight Used For Calorie Calculations: 62.9 kg  Energy Calorie Requirements (kcal): 7445-8278 kcal  Energy Need Method: Reynolds-St Jeor (x 1.4-1.5 wt maintenance/gain)  Protein Requirements: 1-1.2 g protein/kg ( 62-81 g)  Weight Used For Protein Calculations: 62.9 kg   RDA: 900-2000 ml or per MD         Nutrition Prescription Ordered    Current Diet Order: tube feed above @ 40 ml/hr     Evaluation of Received Nutrient/Fluid Intake    Energy Calories Required: not meeting 85% of needs  Protein Required: meeting needs  Fluid Required: not meeting needs  Tolerance: tolerating    Intake/Output Summary (Last 24 hours) at 4/24/2023 1432  Last data filed at 4/23/2023 2000  Gross per 24 hour   Intake 900 ml   Output --   Net 900 ml       % Intake of Estimated Energy Needs: 76%  % Meal Intake: NPO + TF @ 40 ml/hr    Nutrition Risk    Level of " Risk/Frequency of Follow-up: 1 x weekly    Monitor and Evaluation    Food and Nutrient Intake: energy intake, food and beverage intake, enteral nutrition intake  Food and Nutrient Adminstration: diet order, enteral and parenteral nutrition administration  Physical Activity and Function: nutrition-related ADLs and IADLs, factors affecting access to physical activity  Anthropometric Measurements: weight, weight change, body mass index  Biochemical Data, Medical Tests and Procedures: electrolyte and renal panel, gastrointestinal profile, glucose/endocrine profile  Nutrition-Focused Physical Findings: overall appearance     Nutrition Follow-Up    RD Follow-up?: Yes

## 2023-04-24 NOTE — NURSING
Nurses Note -- 4 Eyes      4/24/2023   8:26 AM      Skin assessed during: Q Shift Change      [x] No Altered Skin Integrity Present    [x]Prevention Measures Documented      [] Yes- Altered Skin Integrity Present or Discovered   [] LDA Added if Not in Epic (Describe Wound)   [] New Altered Skin Integrity was Present on Admit and Documented in LDA   [] Wound Image Taken    Wound Care Consulted? No    Attending Nurse:  Echo Sahu RN     Second RN/Staff Member: Yvette Islas RN

## 2023-04-24 NOTE — ASSESSMENT & PLAN NOTE
At home is on 5L O2 via trach and O2 sats typically in high 80s low 90s. Worsening respiration this admission due to aspiration of blood and likely food aspiration. Required ventilator from 3/21 to 3/23, subsequently weaned. Treated empirically for pneumonia. Respiratory cultures show Stenotrophomonas which was not treated  - started bactrim to treat Stenotrophomonas  - worsening hypoxia and hypercapnia on 4/11. This could be related to trazodone use (attempted 4/10 PM for sleep)   - transferred to ICU and placed on ventilator   - treating potential pneumonia. Known Stenotrophomonas- treating with bactrim. Trach aspirate with GNR- tx with cefepime completed    - does not appear that he has had another MI   - significantly improved. No longer requiring ventilatory support even HS   - received thoracentesis w/ subsequent re accumulation of fluid   - Pulmonary is following

## 2023-04-24 NOTE — PT/OT/SLP PROGRESS
Physical Therapy Treatment    Patient Name:  Fareed Richard Jr.   MRN:  [id number]    Recommendations:     Discharge Recommendations:  (PT at next level of care, most likely will still need LTAC to wean down O2)  Discharge Equipment Recommendations: none  Barriers to discharge:  on 10 L / 60 % with trach     Assessment:     Fareed Richard Jr. is a 73 y.o. male admitted with a medical diagnosis of Acute on chronic respiratory failure with hypoxia and hypercapnia.  He presents with the following impairments/functional limitations: weakness, impaired endurance, impaired self care skills, impaired functional mobility, gait instability, impaired balance, decreased lower extremity function, decreased safety awareness, impaired cardiopulmonary response to activity, decreased ROM, decreased upper extremity function .    Rehab Prognosis: Fair; patient would benefit from acute skilled PT services to address these deficits and reach maximum level of function.    Recent Surgery: Procedure(s) (LRB):  Angioplasty-coronary (Left)  Aortogram, Aortic Arch  AORTOGRAM, WITH SERIALOGRAPHY (N/A)  Stent, Coronary, Multi Vessel 28 Days Post-Op    Plan:     During this hospitalization, patient to be seen  (2-3x/wk) to address the identified rehab impairments via gait training, therapeutic activities, therapeutic exercises, neuromuscular re-education, wheelchair management/training and progress toward the following goals:    Plan of Care Expires:  05/05/23    Subjective     Chief Complaint: none   Patient/Family Comments/goals: pt is pleasant and agreeable to therapy   Pain/Comfort:  Pain Rating 1: 0/10  Pain Rating Post-Intervention 1: 0/10      Objective:     Communicated with nurse  prior to session.  Patient found HOB elevated with telemetry, pulse ox (continuous), Tracheostomy, G/J tube, bed alarm, PICC line, oxygen, Condom Catheter upon PT entry to room.     General Precautions: Standard, fall, respiratory  Orthopedic Precautions:  N/A  Braces: N/A  Respiratory Status: 10L/ 60 % w/ Tracheostomy      Functional Mobility:  Bed Mobility:     Rolling Right:  supervision  Scooting: supervision  Supine to Sit: supervision  , HOB elevated, bedside rail   Sit to Supine: supervision , HOB elevated    Transfers:     Sit to Stand: x 2 trials from bed  and 1 trial from chair stand by assistance with rolling walker. VC's for safety technique and walker management.   Gait: pt ambulated  8 side steps , 7-8  steps and 10 reps x 2 standing marching in placed with RW, CGA/SBA . Pt required seated rest breaks throughout treatment 2* to fatigue, SpO2: 95%-100% , HR :  bpm throughout treatment.   Balance: good in sitting , fair in standing.           AM-PAC 6 CLICK MOBILITY  Turning over in bed (including adjusting bedclothes, sheets and blankets)?: 4  Sitting down on and standing up from a chair with arms (e.g., wheelchair, bedside commode, etc.): 3  Moving from lying on back to sitting on the side of the bed?: 4  Moving to and from a bed to a chair (including a wheelchair)?: 3  Need to walk in hospital room?: 3  Climbing 3-5 steps with a railing?: 3  Basic Mobility Total Score: 20       Treatment & Education:  Lower Extremity Exercises.   Patient educated on the purpose of therapeutic exercise.    Patient verbalized acceptance/understanding of instructions, expectations, and limitations(for safety).  Patient performed: 15 reps (each) of B LE There Ex: AP, QS , HS in bed and seated BLE X 15 reps : AP,  LAQ, Hip abd/add, Hip flexion while sitting up on EOB.       Patient required rest breaks,  verbal cues/tactile cues to ensure correct sequence, to maintain proper form, and to allow for self-correction.  Pt reported no complaints or problems with exercise activity.      Patient left up in reclined chair on green air cushion, BUE/BLE elevated, heels offloading with all lines intact, call button in reach, and nurse Echo notified..    GOALS:    Multidisciplinary Problems       Physical Therapy Goals          Problem: Physical Therapy    Goal Priority Disciplines Outcome Goal Variances Interventions   Physical Therapy Goal     PT, PT/OT Unable to Meet, Plan Revised     Description: Goals to be met by: 23     Patient will increase functional independence with mobility by performin. Supine to sit with Modified [place]  2. Sit to stand transfer with Supervision  3. Bed to chair transfer with Supervision   4. Gait  x 10-15 feet with Contact Guard Assistance using Rolling Walker.   5. Lower extremity exercise program x10 reps per handout, with supervision  6. W/C propulsion x 25' with Supervision                         Time Tracking:     PT Received On: 23  PT Start Time: 1152     PT Stop Time: 1222  PT Total Time (min): 30 min     Billable Minutes: Therapeutic Activity 15 and Therapeutic Exercise 15    Treatment Type: Treatment  PT/PTA: PTA     Number of PTA visits since last PT visit: 2023

## 2023-04-24 NOTE — NURSING
Report given to oncoming nurse.  Patient remains on trach collar at 10L/60%, sats noted 98%.  Denies SOB or chest pain at the present time.  Emergency oxygen supplies at the bedside.  Call bell in reach.

## 2023-04-24 NOTE — ASSESSMENT & PLAN NOTE
Troponin elevated when pt was upgraded to the ICU. At that time it was thought to be due to demand, however he had persistent chest pain concerning for ACS. Was started on heparin gtt and tolerated. Subsequently went to [facility] which showed diffuse disease. Pt not a candidate for CABG. He subsequently underwent PCI of LAD and LCx.  - Continue asa, plavix, statin

## 2023-04-25 LAB
ALBUMIN SERPL BCP-MCNC: 2.1 G/DL (ref 3.5–5.2)
ALP SERPL-CCNC: 88 U/L (ref 55–135)
ALT SERPL W/O P-5'-P-CCNC: 23 U/L (ref 10–44)
ANION GAP SERPL CALC-SCNC: 8 MMOL/L (ref 8–16)
AST SERPL-CCNC: 33 U/L (ref 10–40)
BILIRUB SERPL-MCNC: 0.2 MG/DL (ref 0.1–1)
BUN SERPL-MCNC: 26 MG/DL (ref 8–23)
CALCIUM SERPL-MCNC: 8.7 MG/DL (ref 8.7–10.5)
CHLORIDE SERPL-SCNC: 95 MMOL/L (ref 95–110)
CO2 SERPL-SCNC: 30 MMOL/L (ref 23–29)
CREAT SERPL-MCNC: 1 MG/DL (ref 0.5–1.4)
EST. GFR  (NO RACE VARIABLE): >60 ML/MIN/1.73 M^2
FINAL PATHOLOGIC DIAGNOSIS: NORMAL
GLUCOSE SERPL-MCNC: 131 MG/DL (ref 70–110)
Lab: NORMAL
POTASSIUM SERPL-SCNC: 4.2 MMOL/L (ref 3.5–5.1)
PROT SERPL-MCNC: 6.2 G/DL (ref 6–8.4)
SODIUM SERPL-SCNC: 133 MMOL/L (ref 136–145)

## 2023-04-25 PROCEDURE — 94760 N-INVAS EAR/PLS OXIMETRY 1: CPT

## 2023-04-25 PROCEDURE — A4216 STERILE WATER/SALINE, 10 ML: HCPCS | Performed by: HOSPITALIST

## 2023-04-25 PROCEDURE — 94640 AIRWAY INHALATION TREATMENT: CPT

## 2023-04-25 PROCEDURE — 27000221 HC OXYGEN, UP TO 24 HOURS

## 2023-04-25 PROCEDURE — 25000003 PHARM REV CODE 250: Performed by: HOSPITALIST

## 2023-04-25 PROCEDURE — 25000242 PHARM REV CODE 250 ALT 637 W/ HCPCS: Performed by: HOSPITALIST

## 2023-04-25 PROCEDURE — 99900026 HC AIRWAY MAINTENANCE (STAT)

## 2023-04-25 PROCEDURE — 21400001 HC TELEMETRY ROOM

## 2023-04-25 PROCEDURE — 97535 SELF CARE MNGMENT TRAINING: CPT

## 2023-04-25 PROCEDURE — 63600175 PHARM REV CODE 636 W HCPCS: Performed by: STUDENT IN AN ORGANIZED HEALTH CARE EDUCATION/TRAINING PROGRAM

## 2023-04-25 PROCEDURE — 25000242 PHARM REV CODE 250 ALT 637 W/ HCPCS: Performed by: INTERNAL MEDICINE

## 2023-04-25 PROCEDURE — 94664 DEMO&/EVAL PT USE INHALER: CPT

## 2023-04-25 PROCEDURE — 99900035 HC TECH TIME PER 15 MIN (STAT)

## 2023-04-25 PROCEDURE — 80053 COMPREHEN METABOLIC PANEL: CPT | Performed by: HOSPITALIST

## 2023-04-25 RX ADMIN — CLOPIDOGREL BISULFATE 75 MG: 75 TABLET ORAL at 08:04

## 2023-04-25 RX ADMIN — IPRATROPIUM BROMIDE AND ALBUTEROL SULFATE 3 ML: 2.5; .5 SOLUTION RESPIRATORY (INHALATION) at 08:04

## 2023-04-25 RX ADMIN — METOPROLOL TARTRATE 25 MG: 25 TABLET, FILM COATED ORAL at 08:04

## 2023-04-25 RX ADMIN — Medication 10 ML: at 06:04

## 2023-04-25 RX ADMIN — Medication 10 ML: at 12:04

## 2023-04-25 RX ADMIN — ACETYLCYSTEINE 4 ML: 100 SOLUTION ORAL; RESPIRATORY (INHALATION) at 05:04

## 2023-04-25 RX ADMIN — CHLORHEXIDINE GLUCONATE 0.12% ORAL RINSE 15 ML: 1.2 LIQUID ORAL at 08:04

## 2023-04-25 RX ADMIN — FERROUS SULFATE TAB 325 MG (65 MG ELEMENTAL FE) 1 EACH: 325 (65 FE) TAB at 08:04

## 2023-04-25 RX ADMIN — ATORVASTATIN CALCIUM 80 MG: 40 TABLET, FILM COATED ORAL at 08:04

## 2023-04-25 RX ADMIN — HEPARIN SODIUM 5000 UNITS: 5000 INJECTION INTRAVENOUS; SUBCUTANEOUS at 05:04

## 2023-04-25 RX ADMIN — Medication 10 ML: at 05:04

## 2023-04-25 RX ADMIN — FAMOTIDINE 20 MG: 10 INJECTION, SOLUTION INTRAVENOUS at 08:04

## 2023-04-25 RX ADMIN — HEPARIN SODIUM 5000 UNITS: 5000 INJECTION INTRAVENOUS; SUBCUTANEOUS at 01:04

## 2023-04-25 RX ADMIN — IPRATROPIUM BROMIDE AND ALBUTEROL SULFATE 3 ML: 2.5; .5 SOLUTION RESPIRATORY (INHALATION) at 05:04

## 2023-04-25 RX ADMIN — HEPARIN SODIUM 5000 UNITS: 5000 INJECTION INTRAVENOUS; SUBCUTANEOUS at 09:04

## 2023-04-25 RX ADMIN — METOPROLOL TARTRATE 25 MG: 25 TABLET, FILM COATED ORAL at 09:04

## 2023-04-25 RX ADMIN — ACETYLCYSTEINE 4 ML: 100 SOLUTION ORAL; RESPIRATORY (INHALATION) at 08:04

## 2023-04-25 RX ADMIN — FAMOTIDINE 20 MG: 10 INJECTION, SOLUTION INTRAVENOUS at 09:04

## 2023-04-25 RX ADMIN — ASPIRIN 81 MG CHEWABLE TABLET 81 MG: 81 TABLET CHEWABLE at 08:04

## 2023-04-25 RX ADMIN — MELATONIN TAB 3 MG 9 MG: 3 TAB at 09:04

## 2023-04-25 RX ADMIN — SENNOSIDES AND DOCUSATE SODIUM 2 TABLET: 50; 8.6 TABLET ORAL at 08:04

## 2023-04-25 RX ADMIN — IPRATROPIUM BROMIDE AND ALBUTEROL SULFATE 3 ML: 2.5; .5 SOLUTION RESPIRATORY (INHALATION) at 12:04

## 2023-04-25 RX ADMIN — IPRATROPIUM BROMIDE AND ALBUTEROL SULFATE 3 ML: 2.5; .5 SOLUTION RESPIRATORY (INHALATION) at 04:04

## 2023-04-25 NOTE — PROGRESS NOTES
[facility]  Progress Note    Patient Name: Fareed Richard Jr.  MRN: [id number]  Patient Class: IP- Inpatient   Admission Date: 3/18/2023  Length of Stay: 37 days  Attending Physician: Kelby Sargent MD  Primary Care Provider: Kodi Tubbs MD        Subjective:     Principal Problem:Acute on chronic respiratory failure with hypoxia and hypercapnia        HPI:  Fareed Richard Jr. 73 y.o. 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  Per his wife 4 days ago he began to have pink tinged sputum via his trach.  He was seen by his ENT 2 days ago with a scope exam and a trach exchange without evidence of bleeding.  This morning at 3:00 a.m. he developed bright red blood via trach which resolved without intervention.  It is since not recurred.  He takes a daily aspirin.  He receives all medications via G-tube.  His tube feeding regimen consists of 6 cans of Isosource daily with 120 cc free water boluses.  He denies fever chest pain shortness of breath nausea vomiting abdominal pain leg swelling syncope dizziness dysuria melena hematuria hematemesis.    In the ED, hemoglobin 7.6 INR 1.0 BUN 50 creatinine 0.7  troponin negative BRCA negative O positive type and screen chest x-ray without detrimental change hypotensive to 85/54.      Overview/Hospital Course:  72 yo M w squamous CA of tongue s/p glossectomy and reconstructive flap with trach, CAD, chronic hypoxic respiratory failure (on 5L at home) and with peg presented for evaluation of hemoptysis 2 days after tracheostomy change in clinic. Transferred to ICU 3/19 when markedly hypotensive.  Unclear if due to hemorrhage, cardiogenic or septic shock.  Did require PRBC and TXA nebs but didn't seem like sufficient bleeding to cause shock.  Bleeding stopped and ENT following.  CTA shows either significant mucus plugging or  bibasilar pneumonia - pulmonology favored pneumonia.  Trach aspirate with Stenotrophomonas, Achromobacter, and Candida. Also urine culture growing Enterococcus.  He is on broad spectrum antibiotics including bactrim. Developed NSTEMI. Cardiology consulted. [facility] 3/23 which showed distal LM 30%, mild LAD 90% bifurcation lesion with D1, Cx mid 80%, RCA moderate diffuse disease with long 70% and some left to right collateral. All vessels heavily calcified. Not a candidate for CABG but plan for staged PCI. Continued on heparin drip, asa/plavix. Vasopressors weaned. [facility] 3/27 PCI to LAD and LCx. Post operatively he was hemodynamically unstable and anemic. Stat CTA showed RP hematoma near L kidney without acute bleeding. Pt transfused supportively. He improved over the next few days. Stepped down to floor. Remains on trach collar, higher than home O2 requirements. He has delirium as well and poor sleep. Attempted trazodone. The following morning, he was much more lethargic and was transferred to ICU for worsening acute on chronic hypoxic/hypercapnic respiratory failure. This improved after 1 day on the vent.      Trach aspirate with GNR; remains on cefepime in addition to bactrim for prior Stenotrophomonas. Improving at 98%, wean O2. Appears Steno and Achro being effectively treated with bactrim, now growing pseudomonas and has been on cefepime, improved. Abx stopped. Pt had thoracentesis with improvement in breathing, but developed a small ptx. IR consulted for CT placement, but pt refused. No longer requiring ventilatory support at night and PTX improving. Initial plan was for ltac placement, but no long needing vent so does not qualify     -pt/ot reconsulted for placement recommendations  -finishing off bactrim course      Interval History: No new complaints. Stable. Weaning O2.    Review of Systems   Constitutional:  Negative for chills and fever.   Respiratory:  Positive for cough. Negative for shortness of breath.     Cardiovascular:  Negative for chest pain and leg swelling.   Gastrointestinal:  Negative for abdominal distention and abdominal pain.   Objective:     Vital Signs (Most Recent):  Temp: 97.9 °F (36.6 °C) (04/25/23 0725)  Pulse: 84 (04/25/23 0821)  Resp: 18 (04/25/23 0821)  BP: 103/64 (04/25/23 0725)  SpO2: 95 % (04/25/23 0821) Vital Signs (24h Range):  Temp:  [97.6 °F (36.4 °C)-98.9 °F (37.2 °C)] 97.9 °F (36.6 °C)  Pulse:  [78-92] 84  Resp:  [16-20] 18  SpO2:  [93 %-100 %] 95 %  BP: ()/(57-64) 103/64     Weight: 60.6 kg (133 lb 9.6 oz)  Body mass index is 20.31 kg/m².    Intake/Output Summary (Last 24 hours) at 4/25/2023 1037  Last data filed at 4/24/2023 1530  Gross per 24 hour   Intake --   Output 650 ml   Net -650 ml        Physical Exam  Constitutional:       General: He is not in acute distress.     Appearance: He is ill-appearing. He is not toxic-appearing or diaphoretic.   Cardiovascular:      Rate and Rhythm: Normal rate and regular rhythm.      Heart sounds: No murmur heard.    No gallop.   Pulmonary:      Effort: Pulmonary effort is normal. No respiratory distress.      Breath sounds: Rales present. No wheezing.   Abdominal:      General: Bowel sounds are normal. There is no distension.      Palpations: Abdomen is soft.      Tenderness: There is no abdominal tenderness.       Significant Labs: All pertinent labs within the past 24 hours have been reviewed.    Significant Imaging: I have reviewed all pertinent imaging results/findings within the past 24 hours.      Assessment/Plan:      * Acute on chronic respiratory failure with hypoxia and hypercapnia  At home is on 5L O2 via trach and O2 sats typically in high 80s low 90s. Worsening respiration this admission due to aspiration of blood and likely food aspiration. Required ventilator from 3/21 to 3/23, subsequently weaned. Treated empirically for pneumonia. Respiratory cultures show Stenotrophomonas which was not treated  - started bactrim to treat  "Stenotrophomonas  - worsening hypoxia and hypercapnia on 4/11. This could be related to trazodone use (attempted 4/10 PM for sleep)   - transferred to ICU and placed on ventilator   - treating potential pneumonia. Known Stenotrophomonas- treating with bactrim. Trach aspirate with GNR- tx with cefepime completed    - does not appear that he has had another MI   - significantly improved. No longer requiring ventilatory support even HS   - received thoracentesis w/ subsequent re accumulation of fluid   - Pulmonary is following       Postprocedural pneumothorax  Likely trapped lung      Pleural effusion  Pulmonology consulted who recommended thoracentesis. Performed 4/18 by IR. Pt subsequently developed persistent ptx, but respiratory wise no significant deterioration     -ir consulted for Chest tube eval, but pt refused. PTX seems to be improving. Fluid returning    Fluid studies show borderline transudate although no LDH was drawn on the day of thoracentesis      Retroperitoneal hematoma  RP hematoma discovered after LHC. See "acute blood loss anemia"      NSTEMI (non-ST elevated myocardial infarction)  See "coronary artery disease"      Anxiety  Anxiety waxes and wanes with clinical status. He also has delirium  - Stopped hydroxyzine as this could be contributing to delirium   - Prior bad reaction to ativan  - Attempted trazodone to help sleep and then had worsening encephalopathy and hypoxic/hypercapnic respiratory failure the next morning  - may try mirtazapine if this becomes an issue    Hemoptysis  -See acute blood loss anemia  -this has resolved     Pneumonia  3/22 Respiratory culture: Stenotrophomonas, Achromobacter--> on bactrim x14 days for this  4/11 Respiratory culture: pseudomonas -> completed cefepime course    PEG (percutaneous endoscopic gastrostomy) status  Continue medications via PEG. Does not take oral intake.   -On isosource 1.5 6 days daily with 120cc free water flushes via wife  -Will continue " home tube feedings, with nutrition consulted    Tracheostomy present  See respiratory failure     ACP (advance care planning)  Advance Care Planning     Date: 04/10/2023  Palliative consulted, reviewed documentation. Full code. Needs home palliative follow up upon discharge. Time spent reviewing on 4/10/23= 5 minutes          Severe protein-calorie malnutrition  RD following. On tube feeds.      Secondary malignant neoplasm of cervical lymph node  Noted      Acute blood loss anemia  Pt presented with hemoptysis. Scopes prior to admit and by ENT during admit not showing clear source. Bleeding improved. Pt underwent LHC for NSTEMI. After that procedure he was found to have a large retroperitoneal hematoma. Since starting DAPT he has also had recurrent hemoptysis as well as melanic stools. Trach cuff reinflated 4/2 with improvement in bleeding.  - trend CBC, transfuse prn  - Hgb now stable  - continue asa, plavix     COPD exacerbation  This seems resolved; however, did develop worsening hypoxic/hypercapnic respiratory failure  - coninue nebs      Other hyperlipidemia  -Continue home statin    Primary hypertension  Previously hypotensive, now normotensive  - continue metoprolol     Coronary artery disease involving native coronary artery of native heart with unstable angina pectoris  Troponin elevated when pt was upgraded to the ICU. At that time it was thought to be due to demand, however he had persistent chest pain concerning for ACS. Was started on heparin gtt and tolerated. Subsequently went to [facility] which showed diffuse disease. Pt not a candidate for CABG. He subsequently underwent PCI of LAD and LCx.  - Continue asa, plavix, statin    Squamous cell cancer of tongue  Dx 2021, now s/p total glossectomy, bilateral neck dissection, bilateral cervical facial flaps and anterolateral thigh flap reconstruction of glossectomy defect 1/4/22. Completed adjuvant chemoradiation. Had trach changed by ENT 2 days prior to admit  and scope at that time showed no signs of bleeding despite hemoptysis present on admission.     Carotid stenosis  Continues on asa, plavix, statin       Peripheral vascular disease  Continues on asa, plavix, statin         VTE Risk Mitigation (From admission, onward)         Ordered     heparin (porcine) injection 5,000 Units  Every 8 hours         04/20/23 1325     IP VTE LOW RISK PATIENT  Once         04/11/23 0909     Place sequential compression device  Until discontinued         03/18/23 1620     Place NIKITA hose  Until discontinued         03/18/23 1620                Discharge Planning   NISA: 4/25/2023     Code Status: Full Code   Is the patient medically ready for discharge?:     Reason for patient still in hospital (select all that apply): Patient trending condition, Laboratory test, Treatment, Consult recommendations and Pending disposition  Discharge Plan A: Long-term acute care facility (LTAC)   Discharge Delays: None known at this time              Kelby Sargent MD  Department of Hospital Medicine   [facility]

## 2023-04-25 NOTE — PLAN OF CARE
Problem: Adult Inpatient Plan of Care  Goal: Plan of Care Review  Flowsheets (Taken 4/25/2023 0507)  Plan of Care Reviewed With: patient  Goal: Absence of Hospital-Acquired Illness or Injury  Intervention: Identify and Manage Fall Risk  Flowsheets (Taken 4/25/2023 0507)  Safety Promotion/Fall Prevention: Fall Risk reviewed with patient/family  Intervention: Prevent Skin Injury  Flowsheets (Taken 4/25/2023 0507)  Body Position: turned  Intervention: Prevent and Manage VTE (Venous Thromboembolism) Risk  Flowsheets (Taken 4/25/2023 0507)  VTE Prevention/Management: ROM (active) performed  Range of Motion: active ROM (range of motion) encouraged  Goal: Optimal Comfort and Wellbeing  Intervention: Monitor Pain and Promote Comfort  Flowsheets (Taken 4/25/2023 0507)  Pain Management Interventions: pain management plan reviewed with patient/caregiver  Intervention: Provide Person-Centered Care  Flowsheets (Taken 4/25/2023 0507)  Trust Relationship/Rapport:   care explained   questions answered   questions encouraged   thoughts/feelings acknowledged     Problem: Fall Injury Risk  Goal: Absence of Fall and Fall-Related Injury  Intervention: Identify and Manage Contributors  Flowsheets (Taken 4/25/2023 0507)  Self-Care Promotion: independence encouraged  Medication Review/Management: medications reviewed  Intervention: Promote Injury-Free Environment  Flowsheets (Taken 4/25/2023 0507)  Safety Promotion/Fall Prevention: Fall Risk reviewed with patient/family     Problem: Coping Ineffective  Goal: Effective Coping  Intervention: Support and Enhance Coping Strategies  Flowsheets (Taken 4/25/2023 0507)  Supportive Measures: verbalization of feelings encouraged

## 2023-04-25 NOTE — PLAN OF CARE
Problem: Occupational Therapy  Goal: Occupational Therapy Goal  Description: Goals to be met by: 5/3/23     Patient will increase functional independence with ADLs by performing:    LE Dressing with Modified [place] and Supervision.  Grooming while standing at sink with Stand-by Assistance and seated rest breaks as needed.   Toileting from toilet with Stand-by Assistance as needed for hygiene and clothing management.   Step transfer with Modified [place]. MET  Toilet transfer to toilet with Modified [place].   Upper extremity exercise program x10 reps per handout, with assistance as needed.  Implementation of PLB and energy conservation strategies for safe performance w/ ADLs and functional mobility.     Outcome: Ongoing, Progressing

## 2023-04-25 NOTE — NURSING
[facility] [facility] [place] 4 [place]      4/25/2023       Skin assessed on: Q Shift      [x] No Pressure Injuries Present    [x]Prevention Measures Documented    [] Yes LDA  for Pressure Injury Previously documented     [] Yes New Pressure Injury Discovered   [] LDA for New Pressure Injury Added      Attending RN:  Shelbi Retana RN     Second RN:  Winter Centeno RN

## 2023-04-25 NOTE — PT/OT/SLP PROGRESS
Occupational Therapy   Treatment    Name: Fareed Richard Jr.  MRN: [id number]  Admitting Diagnosis:  Acute on chronic respiratory failure with hypoxia and hypercapnia  29 Days Post-Op    Recommendations:     Discharge Recommendations:  (OT at next appropriate level)  Discharge Equipment Recommendations:   (TBD pending ongoing assessment.)  Barriers to discharge:  Other (Comment) (he is currently on 10L of oxygen)    Assessment:     Fareed Richard Jr. is a 73 y.o. male with a medical diagnosis of Acute on chronic respiratory failure with hypoxia and hypercapnia.  He presents with up in chair with black, tarry, loose stool and soiled linen and underwear on 10L oxygen. Performance deficits affecting function are weakness, impaired endurance, impaired self care skills, impaired functional mobility, gait instability, impaired balance, decreased upper extremity function, decreased lower extremity function, impaired cardiopulmonary response to activity.     Rehab Prognosis:  Fair; patient would benefit from acute skilled OT services to address these deficits and reach maximum level of function.       Plan:     Patient to be seen 3 x/week to address the above listed problems via self-care/home management, therapeutic activities, therapeutic exercises  Plan of Care Expires: 05/03/23  Plan of Care Reviewed with: patient    Subjective     Chief Complaint: fatigue, weakness   Patient/Family Comments/goals: none   Pain/Comfort:  Pain Rating 1:  (patient became fatigued, but did not name pain)    Objective:     Communicated with: RN, April prior to session.  Patient found up in chair with PEG Tube, Tracheostomy, peripheral IV, oxygen, catheter (flores),  upon OT entry to room.    General Precautions: Standard, fall, NPO, respiratory, other (see comments) (poor endurance)    Orthopedic Precautions:N/A  Braces: N/A  Respiratory Status: Nasal cannula, flow 10 L/min     Occupational Performance:     Bed Mobility:    Patient completed Sit  to Supine with stand by assistance     Functional Mobility/Transfers:  Patient completed Sit <> Stand Transfer with supervision  with  no assistive device   Patient completed Bed <> Chair Transfer using Stand Pivot technique with contact guard assistance with no assistive device  Functional Mobility: Patient took 2-3 steps from chair to bed and then sat EOB to supine due to fatigue after standing multiple times for toileting, linen change in chair.     Activities of Daily Living:  Upper Body Dressing: total assistance with gown   Lower Body Dressing: total assistance with disposable underwear   Toileting: total assistance with wipes and perineal management and clothing management       [facility] 6 Click ADL: 11    Treatment & Education:  Patient stood multiple times (3-4) to assist in toileting due to bowel accident. He stood for 5 min. In intervals, but had to sit due to fatigue. He t/f from chair to bed with taking 2-3 steps and then CGA with sit to sup t/f and max A x 2 to scoot to HOB due to fatigue.     Patient left HOB elevated with all lines intact, call button in reach, bed alarm on, and RN notified    GOALS:   Multidisciplinary Problems       Occupational Therapy Goals          Problem: Occupational Therapy    Goal Priority Disciplines Outcome Interventions   Occupational Therapy Goal     OT, PT/OT Ongoing, Progressing    Description: Goals to be met by: 5/3/23     Patient will increase functional independence with ADLs by performing:    LE Dressing with Modified [place] and Supervision.  Grooming while standing at sink with Stand-by Assistance and seated rest breaks as needed.   Toileting from toilet with Stand-by Assistance as needed for hygiene and clothing management.   Step transfer with Modified [place].   Toilet transfer to toilet with Modified [place].   Upper extremity exercise program x10 reps per handout, with assistance as needed.  Implementation of PLB and energy conservation  strategies for safe performance w/ ADLs and functional mobility.                          Time Tracking:     OT Date of Treatment: 04/25/23  OT Start Time: 0228  OT Stop Time: 0320  OT Total Time (min): 52 min    Billable Minutes:Self Care/Home Management 52    OT/NELLY: OT          4/25/2023

## 2023-04-25 NOTE — ASSESSMENT & PLAN NOTE
Troponin elevated when pt was upgraded to the ICU. At that time it was thought to be due to demand, however he had persistent chest pain concerning for ACS. Was started on heparin gtt and tolerated. Subsequently went to [facility] which showed diffuse disease. Pt not a candidate for CABG. He subsequently underwent PCI of LAD and LCx.  - Continue asa, plavix, statin

## 2023-04-25 NOTE — PT/OT/SLP PROGRESS
Physical Therapy      Patient Name:  Fareed Richard Jr.   MRN:  [id number]    Patient not seen today secondary to Patient fatigue from a long treatment with OT.  Will follow-up as able later hour/day .

## 2023-04-25 NOTE — NURSING
"Went in to room to speak with wife. Pt stated ti his wife that "he didn't remember med pass this morning. Pt received all morning med's via [employer]. Explained all meds with pt this morning.  "

## 2023-04-25 NOTE — PLAN OF CARE
CM discussed discharge planning with pt's spouse, Bridgett. Pt's wife stated she received a letter from [facility] stating pt was denied for LTAC. Per pervious CM , pt doesn't meet criteria for skilled nursing. Pt's spouse stated pt wouldn't go to a skilled nursing. MD notified. Waiting on PT/OT recs.     04/25/23 1152   Discharge Reassessment   Assessment Type Discharge Planning Reassessment   Did the patient's condition or plan change since previous assessment? Yes   Discharge Plan discussed with: Spouse/sig other   Name(s) and Number(s) Bridgett- [phone]   Discharge Plan A Other  (tbd)   Discharge Plan B Home with family   DME Needed Upon Discharge  other (see comments)  (tbd)   Discharge Barriers Identified None   Why the patient remains in the hospital Requires continued medical care   Post-Acute Status   Discharge Delays (!) Post-Acute Set-up

## 2023-04-25 NOTE — NURSING
Per handoff received from Echo MOLINA RN. Patient care assumed. Patients overall condition assessed and patient appears to be in NAD with no complaints of pain.ROSEANNE PICC is clean, dry, and intact. Call light in reach and patient instructed to inform the nurse if anything is needed. Patient stable and will continue to be monitored.

## 2023-04-25 NOTE — SUBJECTIVE & OBJECTIVE
Interval History: No new complaints. Stable. Weaning O2.    Review of Systems   Constitutional:  Negative for chills and fever.   Respiratory:  Positive for cough. Negative for shortness of breath.    Cardiovascular:  Negative for chest pain and leg swelling.   Gastrointestinal:  Negative for abdominal distention and abdominal pain.   Objective:     Vital Signs (Most Recent):  Temp: 97.9 °F (36.6 °C) (04/25/23 0725)  Pulse: 84 (04/25/23 0821)  Resp: 18 (04/25/23 0821)  BP: 103/64 (04/25/23 0725)  SpO2: 95 % (04/25/23 0821) Vital Signs (24h Range):  Temp:  [97.6 °F (36.4 °C)-98.9 °F (37.2 °C)] 97.9 °F (36.6 °C)  Pulse:  [78-92] 84  Resp:  [16-20] 18  SpO2:  [93 %-100 %] 95 %  BP: ()/(57-64) 103/64     Weight: 60.6 kg (133 lb 9.6 oz)  Body mass index is 20.31 kg/m².    Intake/Output Summary (Last 24 hours) at 4/25/2023 1037  Last data filed at 4/24/2023 1530  Gross per 24 hour   Intake --   Output 650 ml   Net -650 ml        Physical Exam  Constitutional:       General: He is not in acute distress.     Appearance: He is ill-appearing. He is not toxic-appearing or diaphoretic.   Cardiovascular:      Rate and Rhythm: Normal rate and regular rhythm.      Heart sounds: No murmur heard.    No gallop.   Pulmonary:      Effort: Pulmonary effort is normal. No respiratory distress.      Breath sounds: Rales present. No wheezing.   Abdominal:      General: Bowel sounds are normal. There is no distension.      Palpations: Abdomen is soft.      Tenderness: There is no abdominal tenderness.       Significant Labs: All pertinent labs within the past 24 hours have been reviewed.    Significant Imaging: I have reviewed all pertinent imaging results/findings within the past 24 hours.

## 2023-04-25 NOTE — NURSING
[facility], [facility]  Nurses Note -- 4 Eyes      4/24/2023       Skin assessed on: Q Shift      [x] No Pressure Injuries Present    []Prevention Measures Documented    [] Yes LDA  for Pressure Injury Previously documented     [] Yes New Pressure Injury Discovered   [] LDA for New Pressure Injury Added      Attending RN:  Winter Centeno RN     Second RN:  Echo MOLINA RN

## 2023-04-26 LAB
ALBUMIN SERPL BCP-MCNC: 2.2 G/DL (ref 3.5–5.2)
ALP SERPL-CCNC: 95 U/L (ref 55–135)
ALT SERPL W/O P-5'-P-CCNC: 25 U/L (ref 10–44)
ANION GAP SERPL CALC-SCNC: 7 MMOL/L (ref 8–16)
AST SERPL-CCNC: 33 U/L (ref 10–40)
BASOPHILS # BLD AUTO: 0.05 K/UL (ref 0–0.2)
BASOPHILS NFR BLD: 1.2 % (ref 0–1.9)
BILIRUB SERPL-MCNC: 0.3 MG/DL (ref 0.1–1)
BUN SERPL-MCNC: 23 MG/DL (ref 8–23)
CALCIUM SERPL-MCNC: 8.9 MG/DL (ref 8.7–10.5)
CHLORIDE SERPL-SCNC: 96 MMOL/L (ref 95–110)
CO2 SERPL-SCNC: 30 MMOL/L (ref 23–29)
CREAT SERPL-MCNC: 0.9 MG/DL (ref 0.5–1.4)
DIFFERENTIAL METHOD: ABNORMAL
EOSINOPHIL # BLD AUTO: 0.2 K/UL (ref 0–0.5)
EOSINOPHIL NFR BLD: 5.6 % (ref 0–8)
ERYTHROCYTE [DISTWIDTH] IN BLOOD BY AUTOMATED COUNT: 16 % (ref 11.5–14.5)
EST. GFR  (NO RACE VARIABLE): >60 ML/MIN/1.73 M^2
GLUCOSE SERPL-MCNC: 80 MG/DL (ref 70–110)
HCT VFR BLD AUTO: 27.4 % (ref 40–54)
HGB BLD-MCNC: 8.4 G/DL (ref 14–18)
IMM GRANULOCYTES # BLD AUTO: 0.02 K/UL (ref 0–0.04)
IMM GRANULOCYTES NFR BLD AUTO: 0.5 % (ref 0–0.5)
LYMPHOCYTES # BLD AUTO: 0.5 K/UL (ref 1–4.8)
LYMPHOCYTES NFR BLD: 12.6 % (ref 18–48)
MCH RBC QN AUTO: 28.1 PG (ref 27–31)
MCHC RBC AUTO-ENTMCNC: 30.7 G/DL (ref 32–36)
MCV RBC AUTO: 92 FL (ref 82–98)
MONOCYTES # BLD AUTO: 0.5 K/UL (ref 0.3–1)
MONOCYTES NFR BLD: 11.5 % (ref 4–15)
NEUTROPHILS # BLD AUTO: 2.9 K/UL (ref 1.8–7.7)
NEUTROPHILS NFR BLD: 68.6 % (ref 38–73)
NRBC BLD-RTO: 0 /100 WBC
PLATELET # BLD AUTO: 245 K/UL (ref 150–450)
PMV BLD AUTO: 9.3 FL (ref 9.2–12.9)
POTASSIUM SERPL-SCNC: 4.4 MMOL/L (ref 3.5–5.1)
PROT SERPL-MCNC: 6.6 G/DL (ref 6–8.4)
RBC # BLD AUTO: 2.99 M/UL (ref 4.6–6.2)
SODIUM SERPL-SCNC: 133 MMOL/L (ref 136–145)
WBC # BLD AUTO: 4.27 K/UL (ref 3.9–12.7)

## 2023-04-26 PROCEDURE — 25000242 PHARM REV CODE 250 ALT 637 W/ HCPCS: Performed by: HOSPITALIST

## 2023-04-26 PROCEDURE — 25000003 PHARM REV CODE 250: Performed by: HOSPITALIST

## 2023-04-26 PROCEDURE — 63600175 PHARM REV CODE 636 W HCPCS: Performed by: STUDENT IN AN ORGANIZED HEALTH CARE EDUCATION/TRAINING PROGRAM

## 2023-04-26 PROCEDURE — 94640 AIRWAY INHALATION TREATMENT: CPT

## 2023-04-26 PROCEDURE — 97110 THERAPEUTIC EXERCISES: CPT | Mod: CQ

## 2023-04-26 PROCEDURE — 94760 N-INVAS EAR/PLS OXIMETRY 1: CPT

## 2023-04-26 PROCEDURE — 99900026 HC AIRWAY MAINTENANCE (STAT)

## 2023-04-26 PROCEDURE — 21400001 HC TELEMETRY ROOM

## 2023-04-26 PROCEDURE — 99900035 HC TECH TIME PER 15 MIN (STAT)

## 2023-04-26 PROCEDURE — 80053 COMPREHEN METABOLIC PANEL: CPT | Performed by: HOSPITALIST

## 2023-04-26 PROCEDURE — 94761 N-INVAS EAR/PLS OXIMETRY MLT: CPT

## 2023-04-26 PROCEDURE — 97116 GAIT TRAINING THERAPY: CPT | Mod: CQ

## 2023-04-26 PROCEDURE — 25000242 PHARM REV CODE 250 ALT 637 W/ HCPCS: Performed by: INTERNAL MEDICINE

## 2023-04-26 PROCEDURE — 97530 THERAPEUTIC ACTIVITIES: CPT | Mod: CQ

## 2023-04-26 PROCEDURE — A4216 STERILE WATER/SALINE, 10 ML: HCPCS | Performed by: HOSPITALIST

## 2023-04-26 PROCEDURE — 27000221 HC OXYGEN, UP TO 24 HOURS

## 2023-04-26 PROCEDURE — 25000003 PHARM REV CODE 250: Performed by: STUDENT IN AN ORGANIZED HEALTH CARE EDUCATION/TRAINING PROGRAM

## 2023-04-26 PROCEDURE — 85025 COMPLETE CBC W/AUTO DIFF WBC: CPT | Performed by: STUDENT IN AN ORGANIZED HEALTH CARE EDUCATION/TRAINING PROGRAM

## 2023-04-26 RX ORDER — FUROSEMIDE 20 MG/1
20 TABLET ORAL DAILY
Status: DISCONTINUED | OUTPATIENT
Start: 2023-04-26 | End: 2023-04-28

## 2023-04-26 RX ORDER — METOPROLOL TARTRATE 25 MG/1
12.5 TABLET ORAL 2 TIMES DAILY
Status: DISCONTINUED | OUTPATIENT
Start: 2023-04-26 | End: 2023-04-28

## 2023-04-26 RX ADMIN — METOPROLOL TARTRATE 12.5 MG: 25 TABLET, FILM COATED ORAL at 09:04

## 2023-04-26 RX ADMIN — IPRATROPIUM BROMIDE AND ALBUTEROL SULFATE 3 ML: 2.5; .5 SOLUTION RESPIRATORY (INHALATION) at 12:04

## 2023-04-26 RX ADMIN — IPRATROPIUM BROMIDE AND ALBUTEROL SULFATE 3 ML: 2.5; .5 SOLUTION RESPIRATORY (INHALATION) at 11:04

## 2023-04-26 RX ADMIN — IPRATROPIUM BROMIDE AND ALBUTEROL SULFATE 3 ML: 2.5; .5 SOLUTION RESPIRATORY (INHALATION) at 07:04

## 2023-04-26 RX ADMIN — FAMOTIDINE 20 MG: 10 INJECTION, SOLUTION INTRAVENOUS at 09:04

## 2023-04-26 RX ADMIN — ACETYLCYSTEINE 4 ML: 100 SOLUTION ORAL; RESPIRATORY (INHALATION) at 07:04

## 2023-04-26 RX ADMIN — ACETYLCYSTEINE 4 ML: 100 SOLUTION ORAL; RESPIRATORY (INHALATION) at 12:04

## 2023-04-26 RX ADMIN — HEPARIN SODIUM 5000 UNITS: 5000 INJECTION INTRAVENOUS; SUBCUTANEOUS at 03:04

## 2023-04-26 RX ADMIN — ACETYLCYSTEINE 4 ML: 100 SOLUTION ORAL; RESPIRATORY (INHALATION) at 11:04

## 2023-04-26 RX ADMIN — FUROSEMIDE 20 MG: 20 TABLET ORAL at 12:04

## 2023-04-26 RX ADMIN — Medication 10 ML: at 12:04

## 2023-04-26 RX ADMIN — Medication 10 ML: at 05:04

## 2023-04-26 RX ADMIN — CLOPIDOGREL BISULFATE 75 MG: 75 TABLET ORAL at 09:04

## 2023-04-26 RX ADMIN — SENNOSIDES AND DOCUSATE SODIUM 2 TABLET: 50; 8.6 TABLET ORAL at 09:04

## 2023-04-26 RX ADMIN — ASPIRIN 81 MG CHEWABLE TABLET 81 MG: 81 TABLET CHEWABLE at 09:04

## 2023-04-26 RX ADMIN — ATORVASTATIN CALCIUM 80 MG: 40 TABLET, FILM COATED ORAL at 09:04

## 2023-04-26 RX ADMIN — CHLORHEXIDINE GLUCONATE 0.12% ORAL RINSE 15 ML: 1.2 LIQUID ORAL at 09:04

## 2023-04-26 RX ADMIN — HEPARIN SODIUM 5000 UNITS: 5000 INJECTION INTRAVENOUS; SUBCUTANEOUS at 09:04

## 2023-04-26 RX ADMIN — HEPARIN SODIUM 5000 UNITS: 5000 INJECTION INTRAVENOUS; SUBCUTANEOUS at 05:04

## 2023-04-26 RX ADMIN — MELATONIN TAB 3 MG 9 MG: 3 TAB at 09:04

## 2023-04-26 RX ADMIN — FERROUS SULFATE TAB 325 MG (65 MG ELEMENTAL FE) 1 EACH: 325 (65 FE) TAB at 09:04

## 2023-04-26 RX ADMIN — IPRATROPIUM BROMIDE AND ALBUTEROL SULFATE 3 ML: 2.5; .5 SOLUTION RESPIRATORY (INHALATION) at 03:04

## 2023-04-26 RX ADMIN — ACETYLCYSTEINE 4 ML: 100 SOLUTION ORAL; RESPIRATORY (INHALATION) at 03:04

## 2023-04-26 NOTE — PLAN OF CARE
Problem: Adult Inpatient Plan of Care  Goal: Plan of Care Review  Flowsheets (Taken 4/26/2023 1204)  Plan of Care Reviewed With: patient

## 2023-04-26 NOTE — ASSESSMENT & PLAN NOTE
At home is on 5L O2 via trach and O2 sats typically in high 80s low 90s. Worsening respiration at admission due to aspiration of blood and likely food aspiration. Required ventilator from 3/21 to 3/23, and required it again 4/11. Has received multiple courses of antibiotics for pneumonia as well as thoracentesis c/b trapped lung and reaccumulation of fluid.   - furosemide as tolerated   - finishing bactrim course  - wean O2 as tolerated

## 2023-04-26 NOTE — PROGRESS NOTES
[facility] [place] 4 [place]      4/26/2023       Skin assessed on: Q Shift      [x] No Pressure Injuries Present    [x]Prevention Measures Documented    [] Yes LDA  for Pressure Injury Previously documented     [] Yes New Pressure Injury Discovered   [] LDA for New Pressure Injury Added      Attending RN:  Armaan Valentine RN     Second RN:  Winter Centeno RN

## 2023-04-26 NOTE — ASSESSMENT & PLAN NOTE
Hydroxyzine, ativan, and trazadone all led to worsening encephalopathy. Anxiety now improved  - may try mirtazapine if this recurs

## 2023-04-26 NOTE — ASSESSMENT & PLAN NOTE
Pulmonology consulted who recommended thoracentesis. Performed 4/18 by IR. Pt subsequently developed persistent ptx, but respiratory wise no significant deterioration. Pt refused IR chest tube. Pleural effusion returned. Fluid studies show borderline transudate based on protein and ULN of LDH, however no serum LDH was drawn on the day of thoracentesis.  - monitor  - furosemide as tolerated

## 2023-04-26 NOTE — NURSING
Per handoff received from Shelbi CAMPBELL RN. Patient care assumed. Patients overall condition assessed and patient appears to be in NAD with no complaints of pain.ROSEANNE PICC is clean, dry, and intact. Call light in reach and patient instructed to inform the nurse if anything is needed. Patient stable and will continue to be monitored.

## 2023-04-26 NOTE — NURSING
[facility], [facility] [place]  Nurses Note -- 4 Eyes      4/25/2023       Skin assessed on: Q Shift      [x] No Pressure Injuries Present    []Prevention Measures Documented    [] Yes LDA  for Pressure Injury Previously documented     [] Yes New Pressure Injury Discovered   [] LDA for New Pressure Injury Added      Attending RN:  Winter Centeno RN     Second RN:  Shelbi CAMPBELL RN

## 2023-04-26 NOTE — PLAN OF CARE
Problem: Physical Therapy  Goal: Physical Therapy Goal  Description: Goals to be met by: 23     Patient will increase functional independence with mobility by performin. Supine to sit with Modified [place]  2. Sit to stand transfer with Supervision  3. Bed to chair transfer with Supervision   4. Gait  x 10-15 feet with Contact Guard Assistance using Rolling Walker.   5. Lower extremity exercise program x10 reps per handout, with supervision  6. W/C propulsion x 25' with Supervision    Outcome: Ongoing, Progressing

## 2023-04-26 NOTE — ASSESSMENT & PLAN NOTE
Continues on asa, plavix, statin      Differential diagnosis for her eye complaint includes retinal detachment, vitreous hemorrhage, optic neuritis, migraines, pseudotumor cerebri.   Ankle appears to be synovial or ganglion cyst, does not appear to be fractured, not an abscess. Differential diagnosis for her eye complaint includes autoimmune disorder, retinal detachment (unlikely bilateral), vitreous hemorrhage, very unlikely optic neuritis, migraines, pseudotumor cerebri. Ankle appears to be synovial or ganglion cyst, does not appear to be fractured, not an abscess. VS normal. Exam unremarkable. Will perform ocular ultrasound to evaluate for retinal detachment, though I think this is extremely unlikely given bilateral nature and waxing and waning nature of symptoms.

## 2023-04-26 NOTE — SUBJECTIVE & OBJECTIVE
Interval History: Denies new complaints    Review of Systems   Constitutional:  Negative for chills and fever.   Respiratory:  Negative for cough and shortness of breath.    Cardiovascular:  Negative for chest pain and leg swelling.   Gastrointestinal:  Negative for abdominal distention and abdominal pain.   Objective:     Vital Signs (Most Recent):  Temp: 98.3 °F (36.8 °C) (04/26/23 0732)  Pulse: 89 (Simultaneous filing. User may not have seen previous data.) (04/26/23 0732)  Resp: 18 (Simultaneous filing. User may not have seen previous data.) (04/26/23 0732)  BP: 101/66 (04/26/23 0929)  SpO2: (!) 94 % (Simultaneous filing. User may not have seen previous data.) (04/26/23 0732) Vital Signs (24h Range):  Temp:  [97 °F (36.1 °C)-98.7 °F (37.1 °C)] 98.3 °F (36.8 °C)  Pulse:  [78-89] 89  Resp:  [18-20] 18  SpO2:  [91 %-97 %] 94 %  BP: (101-130)/(60-73) 101/66     Weight: 60.6 kg (133 lb 9.6 oz)  Body mass index is 20.31 kg/m².    Intake/Output Summary (Last 24 hours) at 4/26/2023 0979  Last data filed at 4/26/2023 0627  Gross per 24 hour   Intake 1140 ml   Output 1060 ml   Net 80 ml        Physical Exam  Constitutional:       General: He is not in acute distress.     Appearance: He is ill-appearing. He is not toxic-appearing or diaphoretic.   Cardiovascular:      Rate and Rhythm: Normal rate and regular rhythm.      Heart sounds: No murmur heard.    No gallop.   Pulmonary:      Effort: Pulmonary effort is normal. No respiratory distress.      Breath sounds: Rales present. No wheezing.   Abdominal:      General: Bowel sounds are normal. There is no distension.      Palpations: Abdomen is soft.      Tenderness: There is no abdominal tenderness.       Significant Labs: All pertinent labs within the past 24 hours have been reviewed.    Significant Imaging: I have reviewed all pertinent imaging results/findings within the past 24 hours.

## 2023-04-26 NOTE — PT/OT/SLP PROGRESS
Physical Therapy Treatment    Patient Name:  Fareed Richard Jr.   MRN:  [id number]    Recommendations:     Discharge Recommendations:  (PT at next level of care, most likely will still need LTAC to wean down O2)  Discharge Equipment Recommendations: none  Barriers to discharge: on 10 L 40% trach collar     Assessment:     Fareed Richard Jr. is a 73 y.o. male admitted with a medical diagnosis of Acute on chronic respiratory failure with hypoxia and hypercapnia.  He presents with the following impairments/functional limitations: impaired functional mobility, gait instability, impaired balance, decreased lower extremity function, decreased ROM, decreased safety awareness, impaired self care skills, impaired endurance, weakness, impaired cardiopulmonary response to activity .    Rehab Prognosis: Good; patient would benefit from acute skilled PT services to address these deficits and reach maximum level of function.    Recent Surgery: Procedure(s) (LRB):  Angioplasty-coronary (Left)  Aortogram, Aortic Arch  AORTOGRAM, WITH SERIALOGRAPHY (N/A)  Stent, Coronary, Multi Vessel 30 Days Post-Op    Plan:     During this hospitalization, patient to be seen  (2-3x/wk) to address the identified rehab impairments via gait training, therapeutic activities, therapeutic exercises, neuromuscular re-education, wheelchair management/training and progress toward the following goals:    Plan of Care Expires:  05/05/23    Subjective     Chief Complaint: fatigue with activity   Patient/Family Comments/goals: pt is pleasant and agreeable to therapy   Pain/Comfort:  Pain Rating 1: 0/10  Pain Rating Post-Intervention 1: 0/10      Objective:     Communicated with nurse prior to session.  Patient found HOB elevated with telemetry, bed alarm, Tracheostomy, oxygen, PICC lines , G/J tube, condom catheter  upon PT entry to room.     General Precautions: Standard, fall, respiratory, NPO  Orthopedic Precautions: N/A  Braces: N/A  Respiratory Status: 10L/  40 % w/ Tracheostomy      Functional Mobility:  Bed Mobility:     Scooting: supervision  Supine to Sit: supervision  , HOB elevated, bedside rail   Transfers:     Sit to Stand: x 4 trials from bed  and 1 trial from chair stand by assistance/ supervision with rolling walker. VC's for safety technique and walker management.   Gait: pt ambulated  5 ft x 2 , 6-7 steps and 10 reps standing marching in placed with RW, SBA . Noted with decreased tammi,decreased step length, no LOB . Pt required seated rest breaks throughout treatment 2* to fatigue, SpO2: 93%-99% , HR :  bpm throughout treatment.   Balance: good in sitting , fair+  in standing.         AM-PAC 6 CLICK MOBILITY  Turning over in bed (including adjusting bedclothes, sheets and blankets)?: 4  Sitting down on and standing up from a chair with arms (e.g., wheelchair, bedside commode, etc.): 4  Moving from lying on back to sitting on the side of the bed?: 4  Moving to and from a bed to a chair (including a wheelchair)?: 4  Need to walk in hospital room?: 3  Climbing 3-5 steps with a railing?: 3  Basic Mobility Total Score: 22       Treatment & Education:  Lower Extremity Exercises.   Patient educated on the purpose of therapeutic exercise.    Patient verbalized acceptance/understanding of instructions, expectations, and limitations(for safety).  Patient performed: 15 reps (each) of B LE There Ex: AP, QS , HS in bed and seated BLE X 15 reps : AP,  LAQ, HSC,  while sitting up on EOB.  Performed standing BLE x 10 reps x 2 : heels raise , hip flexion  with RW, SBA      Patient required rest breaks,  verbal cues/tactile cues to ensure correct sequence, to maintain proper form, and to allow for self-correction.  Pt reported no complaints or problems with exercise activity.       Patient left up in reclined chair on green air cushion, BLE elevated, heels offloading  with all lines intact, call button in reach, nurse/PCT  notified, and respiratory therapist   present..    GOALS:   Multidisciplinary Problems       Physical Therapy Goals          Problem: Physical Therapy    Goal Priority Disciplines Outcome Goal Variances Interventions   Physical Therapy Goal     PT, PT/OT Ongoing, Progressing     Description: Goals to be met by: 23     Patient will increase functional independence with mobility by performin. Supine to sit with Modified [place]  2. Sit to stand transfer with Supervision  3. Bed to chair transfer with Supervision   4. Gait  x 10-15 feet with Contact Guard Assistance using Rolling Walker.   5. Lower extremity exercise program x10 reps per handout, with supervision  6. W/C propulsion x 25' with Supervision                         Time Tracking:     PT Received On: 23  PT Start Time: 1144     PT Stop Time: 1222  PT Total Time (min): 38 min     Billable Minutes: Gait Training 8, Therapeutic Activity 15, and Therapeutic Exercise 15    Treatment Type: Treatment  PT/PTA: PTA     Number of PTA visits since last PT visit: 2     2023

## 2023-04-26 NOTE — ASSESSMENT & PLAN NOTE
Blood loss this admission from hemoptysis (improved w/ inflation of trach collar) and retroperitoneal bleed.   - hemoglobin stable  - CBC MWF

## 2023-04-26 NOTE — PROGRESS NOTES
[facility] Medicine  Progress Note    Patient Name: Fareed Richard Jr.  MRN: [id number]  Patient Class: IP- Inpatient   Admission Date: 3/18/2023  Length of Stay: 38 days  Attending Physician: Kelby Sargent MD  Primary Care Provider: Kodi Tubbs MD        Subjective:     Principal Problem:Acute on chronic respiratory failure with hypoxia and hypercapnia        HPI:  Fareed Richard Jr. 73 y.o. 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  Per his wife 4 days ago he began to have pink tinged sputum via his trach.  He was seen by his ENT 2 days ago with a scope exam and a trach exchange without evidence of bleeding.  This morning at 3:00 a.m. he developed bright red blood via trach which resolved without intervention.  It is since not recurred.  He takes a daily aspirin.  He receives all medications via G-tube.  His tube feeding regimen consists of 6 cans of Isosource daily with 120 cc free water boluses.  He denies fever chest pain shortness of breath nausea vomiting abdominal pain leg swelling syncope dizziness dysuria melena hematuria hematemesis.    In the ED, hemoglobin 7.6 INR 1.0 BUN 50 creatinine 0.7  troponin negative BRCA negative O positive type and screen chest x-ray without detrimental change hypotensive to 85/54.      Overview/Hospital Course:  74 yo M w squamous CA of tongue s/p glossectomy and reconstructive flap with trach, CAD, chronic hypoxic respiratory failure (on 5L at home) and with peg presented for evaluation of hemoptysis 2 days after tracheostomy change in clinic. Transferred to ICU 3/19 when markedly hypotensive.  Unclear if due to hemorrhage, cardiogenic or septic shock.  Did require PRBC and TXA nebs but didn't seem like sufficient bleeding to cause shock.  Bleeding stopped and ENT following.  CTA shows either significant mucus plugging or  bibasilar pneumonia - pulmonology favored pneumonia.  Trach aspirate with Stenotrophomonas, Achromobacter, and Candida. Also urine culture growing Enterococcus.  He is on broad spectrum antibiotics including bactrim. Developed NSTEMI. Cardiology consulted. [facility] 3/23 which showed distal LM 30%, mild LAD 90% bifurcation lesion with D1, Cx mid 80%, RCA moderate diffuse disease with long 70% and some left to right collateral. All vessels heavily calcified. Not a candidate for CABG but plan for staged PCI. Continued on heparin drip, asa/plavix. Vasopressors weaned. [facility] 3/27 PCI to LAD and LCx. Post operatively he was hemodynamically unstable and anemic. Stat CTA showed RP hematoma near L kidney without acute bleeding. Pt transfused supportively. He improved over the next few days. Stepped down to floor. Remains on trach collar, higher than home O2 requirements. He has delirium as well and poor sleep. Attempted trazodone. The following morning, he was much more lethargic and was transferred to ICU for worsening acute on chronic hypoxic/hypercapnic respiratory failure. This improved after 1 day on the vent.      Trach aspirate with GNR; remains on cefepime in addition to bactrim for prior Stenotrophomonas. Improving at 98%, wean O2. Appears Steno and Achro being effectively treated with bactrim, now growing pseudomonas and has been on cefepime, improved. Abx stopped. Pt had thoracentesis with improvement in breathing, but developed a small ptx. IR consulted for CT placement, but pt refused. No longer requiring ventilatory support at night and PTX improving. Initial plan was for ltac placement, but no long needing vent so does not qualify    O2 continued to wean on the floor. Improving with PT/OT.      Interval History: Denies new complaints    Review of Systems   Constitutional:  Negative for chills and fever.   Respiratory:  Negative for cough and shortness of breath.    Cardiovascular:  Negative for chest pain and leg  swelling.   Gastrointestinal:  Negative for abdominal distention and abdominal pain.   Objective:     Vital Signs (Most Recent):  Temp: 98.3 °F (36.8 °C) (04/26/23 0732)  Pulse: 89 (Simultaneous filing. User may not have seen previous data.) (04/26/23 0732)  Resp: 18 (Simultaneous filing. User may not have seen previous data.) (04/26/23 0732)  BP: 101/66 (04/26/23 0929)  SpO2: (!) 94 % (Simultaneous filing. User may not have seen previous data.) (04/26/23 0732) Vital Signs (24h Range):  Temp:  [97 °F (36.1 °C)-98.7 °F (37.1 °C)] 98.3 °F (36.8 °C)  Pulse:  [78-89] 89  Resp:  [18-20] 18  SpO2:  [91 %-97 %] 94 %  BP: (101-130)/(60-73) 101/66     Weight: 60.6 kg (133 lb 9.6 oz)  Body mass index is 20.31 kg/m².    Intake/Output Summary (Last 24 hours) at 4/26/2023 0940  Last data filed at 4/26/2023 0627  Gross per 24 hour   Intake 1140 ml   Output 1060 ml   Net 80 ml        Physical Exam  Constitutional:       General: He is not in acute distress.     Appearance: He is ill-appearing. He is not toxic-appearing or diaphoretic.   Cardiovascular:      Rate and Rhythm: Normal rate and regular rhythm.      Heart sounds: No murmur heard.    No gallop.   Pulmonary:      Effort: Pulmonary effort is normal. No respiratory distress.      Breath sounds: Rales present. No wheezing.   Abdominal:      General: Bowel sounds are normal. There is no distension.      Palpations: Abdomen is soft.      Tenderness: There is no abdominal tenderness.       Significant Labs: All pertinent labs within the past 24 hours have been reviewed.    Significant Imaging: I have reviewed all pertinent imaging results/findings within the past 24 hours.      Assessment/Plan:      * Acute on chronic respiratory failure with hypoxia and hypercapnia  At home is on 5L O2 via trach and O2 sats typically in high 80s low 90s. Worsening respiration at admission due to aspiration of blood and likely food aspiration. Required ventilator from 3/21 to 3/23, and required  "it again 4/11. Has received multiple courses of antibiotics for pneumonia as well as thoracentesis c/b trapped lung and reaccumulation of fluid.   - furosemide as tolerated   - finishing bactrim course  - wean O2 as tolerated        Postprocedural pneumothorax  Likely trapped lung      Pleural effusion  Pulmonology consulted who recommended thoracentesis. Performed 4/18 by IR. Pt subsequently developed persistent ptx, but respiratory wise no significant deterioration. Pt refused IR chest tube. Pleural effusion returned. Fluid studies show borderline transudate based on protein and ULN of LDH, however no serum LDH was drawn on the day of thoracentesis.  - monitor  - furosemide as tolerated          Retroperitoneal hematoma  RP hematoma discovered after LHC. See "acute blood loss anemia"      NSTEMI (non-ST elevated myocardial infarction)  See "coronary artery disease"      Anxiety  Hydroxyzine, ativan, and trazadone all led to worsening encephalopathy. Anxiety now improved  - may try mirtazapine if this recurs    Hemoptysis  Resolved after reinflation of trach collar. ENT scopes without visualization of bleeding source    Pneumonia  3/22 Respiratory culture: Stenotrophomonas, Achromobacter--> on bactrim x14 days for this  4/11 Respiratory culture: pseudomonas -> completed cefepime course    PEG (percutaneous endoscopic gastrostomy) status  Continue medications via PEG. Does not take oral intake.   -On isosource 1.5 6 days daily with 120cc free water flushes via wife  -Will continue home tube feedings, with nutrition consulted    Tracheostomy present  See respiratory failure     ACP (advance care planning)  Advance Care Planning     Date: 04/10/2023  Palliative consulted, reviewed documentation. Full code. Needs home palliative follow up upon discharge. Time spent reviewing on 4/10/23= 5 minutes          Severe protein-calorie malnutrition  RD following. On tube feeds.      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   - hemoglobin stable  - CBC MWF    COPD exacerbation  No evidence of current exacerbation      Other hyperlipidemia  -Continue home statin    Primary hypertension  Previously hypotensive, now normotensive  - continue metoprolol with hold parameters    Coronary artery disease involving native coronary artery of native heart with unstable angina pectoris  Troponin elevated when pt was upgraded to the ICU. At that time it was thought to be due to demand, however he had persistent chest pain concerning for ACS. Was started on heparin gtt and tolerated. Subsequently went to [facility] which showed diffuse disease. Pt not a candidate for CABG. He subsequently underwent PCI of LAD and LCx.  - Continue asa, plavix, statin    Squamous cell cancer of tongue  Dx 2021, now s/p total glossectomy, bilateral neck dissection, bilateral cervical facial flaps and anterolateral thigh flap reconstruction of glossectomy defect 1/4/22. Completed adjuvant chemoradiation. Had trach changed by ENT 2 days prior to admit and scope at that time showed no signs of bleeding despite hemoptysis present on admission.     Carotid stenosis  Continues on asa, plavix, statin       Peripheral vascular disease  Continues on asa, plavix, statin         VTE Risk Mitigation (From admission, onward)         Ordered     heparin (porcine) injection 5,000 Units  Every 8 hours         04/20/23 1325     IP VTE LOW RISK PATIENT  Once         04/11/23 0909     Place sequential compression device  Until discontinued         03/18/23 1620     Place NIKITA hose  Until discontinued         03/18/23 1620                Discharge Planning   NISA: 4/25/2023     Code Status: Full Code   Is the patient medically ready for discharge?:     Reason for patient still in hospital (select all that apply): Patient trending condition,  Laboratory test, Treatment, Consult recommendations and Pending disposition  Discharge Plan A: Other (tbd)   Discharge Delays: (!) Post-Acute Set-up              Kelby Sargent MD  Department of Hospital Medicine   [facility]

## 2023-04-26 NOTE — ASSESSMENT & PLAN NOTE
Troponin elevated when pt was upgraded to the ICU. At that time it was thought to be due to demand, however he had persistent chest pain concerning for ACS. Was started on heparin gtt and tolerated. Subsequently went to [facility] which showed diffuse disease. Pt not a candidate for CABG. He subsequently underwent PCI of LAD and LCx.  - Continue asa, plavix, statin

## 2023-04-27 PROCEDURE — 25000003 PHARM REV CODE 250: Performed by: HOSPITALIST

## 2023-04-27 PROCEDURE — 99900026 HC AIRWAY MAINTENANCE (STAT)

## 2023-04-27 PROCEDURE — 25000242 PHARM REV CODE 250 ALT 637 W/ HCPCS: Performed by: INTERNAL MEDICINE

## 2023-04-27 PROCEDURE — 25000003 PHARM REV CODE 250: Performed by: STUDENT IN AN ORGANIZED HEALTH CARE EDUCATION/TRAINING PROGRAM

## 2023-04-27 PROCEDURE — 25000242 PHARM REV CODE 250 ALT 637 W/ HCPCS: Performed by: HOSPITALIST

## 2023-04-27 PROCEDURE — 99900035 HC TECH TIME PER 15 MIN (STAT)

## 2023-04-27 PROCEDURE — 21400001 HC TELEMETRY ROOM

## 2023-04-27 PROCEDURE — 94761 N-INVAS EAR/PLS OXIMETRY MLT: CPT

## 2023-04-27 PROCEDURE — A4216 STERILE WATER/SALINE, 10 ML: HCPCS | Performed by: HOSPITALIST

## 2023-04-27 PROCEDURE — 27000221 HC OXYGEN, UP TO 24 HOURS

## 2023-04-27 PROCEDURE — 94760 N-INVAS EAR/PLS OXIMETRY 1: CPT

## 2023-04-27 PROCEDURE — 97110 THERAPEUTIC EXERCISES: CPT | Mod: CQ

## 2023-04-27 PROCEDURE — 97530 THERAPEUTIC ACTIVITIES: CPT | Mod: CQ

## 2023-04-27 PROCEDURE — 94640 AIRWAY INHALATION TREATMENT: CPT

## 2023-04-27 PROCEDURE — 63600175 PHARM REV CODE 636 W HCPCS: Performed by: STUDENT IN AN ORGANIZED HEALTH CARE EDUCATION/TRAINING PROGRAM

## 2023-04-27 RX ADMIN — IPRATROPIUM BROMIDE AND ALBUTEROL SULFATE 3 ML: 2.5; .5 SOLUTION RESPIRATORY (INHALATION) at 12:04

## 2023-04-27 RX ADMIN — IPRATROPIUM BROMIDE AND ALBUTEROL SULFATE 3 ML: 2.5; .5 SOLUTION RESPIRATORY (INHALATION) at 08:04

## 2023-04-27 RX ADMIN — FAMOTIDINE 20 MG: 10 INJECTION, SOLUTION INTRAVENOUS at 09:04

## 2023-04-27 RX ADMIN — IPRATROPIUM BROMIDE AND ALBUTEROL SULFATE 3 ML: 2.5; .5 SOLUTION RESPIRATORY (INHALATION) at 07:04

## 2023-04-27 RX ADMIN — ACETYLCYSTEINE 4 ML: 100 SOLUTION ORAL; RESPIRATORY (INHALATION) at 08:04

## 2023-04-27 RX ADMIN — IPRATROPIUM BROMIDE AND ALBUTEROL SULFATE 3 ML: 2.5; .5 SOLUTION RESPIRATORY (INHALATION) at 11:04

## 2023-04-27 RX ADMIN — CHLORHEXIDINE GLUCONATE 0.12% ORAL RINSE 15 ML: 1.2 LIQUID ORAL at 09:04

## 2023-04-27 RX ADMIN — HYDROXYZINE HYDROCHLORIDE 25 MG: 25 TABLET ORAL at 09:04

## 2023-04-27 RX ADMIN — ASPIRIN 81 MG CHEWABLE TABLET 81 MG: 81 TABLET CHEWABLE at 09:04

## 2023-04-27 RX ADMIN — HEPARIN SODIUM 5000 UNITS: 5000 INJECTION INTRAVENOUS; SUBCUTANEOUS at 01:04

## 2023-04-27 RX ADMIN — Medication 10 ML: at 12:04

## 2023-04-27 RX ADMIN — HEPARIN SODIUM 5000 UNITS: 5000 INJECTION INTRAVENOUS; SUBCUTANEOUS at 05:04

## 2023-04-27 RX ADMIN — IPRATROPIUM BROMIDE AND ALBUTEROL SULFATE 3 ML: 2.5; .5 SOLUTION RESPIRATORY (INHALATION) at 04:04

## 2023-04-27 RX ADMIN — MELATONIN TAB 3 MG 9 MG: 3 TAB at 08:04

## 2023-04-27 RX ADMIN — Medication 10 ML: at 06:04

## 2023-04-27 RX ADMIN — Medication 10 ML: at 05:04

## 2023-04-27 RX ADMIN — ATORVASTATIN CALCIUM 80 MG: 40 TABLET, FILM COATED ORAL at 09:04

## 2023-04-27 RX ADMIN — METOPROLOL TARTRATE 12.5 MG: 25 TABLET, FILM COATED ORAL at 08:04

## 2023-04-27 RX ADMIN — ACETYLCYSTEINE 4 ML: 100 SOLUTION ORAL; RESPIRATORY (INHALATION) at 11:04

## 2023-04-27 RX ADMIN — FERROUS SULFATE TAB 325 MG (65 MG ELEMENTAL FE) 1 EACH: 325 (65 FE) TAB at 09:04

## 2023-04-27 RX ADMIN — ACETYLCYSTEINE 4 ML: 100 SOLUTION ORAL; RESPIRATORY (INHALATION) at 04:04

## 2023-04-27 RX ADMIN — CLOPIDOGREL BISULFATE 75 MG: 75 TABLET ORAL at 09:04

## 2023-04-27 RX ADMIN — FAMOTIDINE 20 MG: 10 INJECTION, SOLUTION INTRAVENOUS at 08:04

## 2023-04-27 RX ADMIN — FUROSEMIDE 20 MG: 20 TABLET ORAL at 09:04

## 2023-04-27 RX ADMIN — CHLORHEXIDINE GLUCONATE 0.12% ORAL RINSE 15 ML: 1.2 LIQUID ORAL at 08:04

## 2023-04-27 NOTE — NURSING
[facility] [place]  Nurses Note -- 4 Eyes      4/26/2023       Skin assessed on: Q Shift      [x] No Pressure Injuries Present    [x]Prevention Measures Documented    [] Yes LDA  for Pressure Injury Previously documented     [] Yes New Pressure Injury Discovered   [] LDA for New Pressure Injury Added      Attending RN:  Nila Thomson RN     Second RN:  Armaan Valentine RN

## 2023-04-27 NOTE — PROGRESS NOTES
[facility] Medicine  Progress Note    Patient Name: Fareed Richard Jr.  MRN: [id number]  Patient Class: IP- Inpatient   Admission Date: 3/18/2023  Length of Stay: 39 days  Attending Physician: Kelby Sargent MD  Primary Care Provider: Kodi Tubbs MD        Subjective:     Principal Problem:Acute on chronic respiratory failure with hypoxia and hypercapnia        HPI:  Fareed Richard Jr. 73 y.o. 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  Per his wife 4 days ago he began to have pink tinged sputum via his trach.  He was seen by his ENT 2 days ago with a scope exam and a trach exchange without evidence of bleeding.  This morning at 3:00 a.m. he developed bright red blood via trach which resolved without intervention.  It is since not recurred.  He takes a daily aspirin.  He receives all medications via G-tube.  His tube feeding regimen consists of 6 cans of Isosource daily with 120 cc free water boluses.  He denies fever chest pain shortness of breath nausea vomiting abdominal pain leg swelling syncope dizziness dysuria melena hematuria hematemesis.    In the ED, hemoglobin 7.6 INR 1.0 BUN 50 creatinine 0.7  troponin negative BRCA negative O positive type and screen chest x-ray without detrimental change hypotensive to 85/54.      Overview/Hospital Course:  72 yo M w squamous CA of tongue s/p glossectomy and reconstructive flap with trach, CAD, chronic hypoxic respiratory failure (on 5L at home) and with peg presented for evaluation of hemoptysis 2 days after tracheostomy change in clinic. Transferred to ICU 3/19 when markedly hypotensive.  Unclear if due to hemorrhage, cardiogenic or septic shock.  Did require PRBC and TXA nebs but didn't seem like sufficient bleeding to cause shock.  Bleeding stopped and ENT following.  CTA shows either significant mucus plugging or  bibasilar pneumonia - pulmonology favored pneumonia.  Trach aspirate with Stenotrophomonas, Achromobacter, and Candida. Also urine culture growing Enterococcus.  He is on broad spectrum antibiotics including bactrim. Developed NSTEMI. Cardiology consulted. [facility] 3/23 which showed distal LM 30%, mild LAD 90% bifurcation lesion with D1, Cx mid 80%, RCA moderate diffuse disease with long 70% and some left to right collateral. All vessels heavily calcified. Not a candidate for CABG but plan for staged PCI. Continued on heparin drip, asa/plavix. Vasopressors weaned. [facility] 3/27 PCI to LAD and LCx. Post operatively he was hemodynamically unstable and anemic. Stat CTA showed RP hematoma near L kidney without acute bleeding. Pt transfused supportively. He improved over the next few days. Stepped down to floor. Remains on trach collar, higher than home O2 requirements. He has delirium as well and poor sleep. Attempted trazodone. The following morning, he was much more lethargic and was transferred to ICU for worsening acute on chronic hypoxic/hypercapnic respiratory failure. This improved after 1 day on the vent.      Trach aspirate with GNR; remains on cefepime in addition to bactrim for prior Stenotrophomonas. Improving at 98%, wean O2. Appears Steno and Achro being effectively treated with bactrim, now growing pseudomonas and has been on cefepime, improved. Abx stopped. Pt had thoracentesis with improvement in breathing, but developed a small ptx. IR consulted for CT placement, but pt refused. No longer requiring ventilatory support at night and PTX improving. Initial plan was for ltac placement, but no long needing vent so does not qualify    O2 continued to wean on the floor. Improving with PT/OT.      Interval History: No events overnight. Weaning O2    Review of Systems   Constitutional:  Negative for chills and fever.   Respiratory:  Negative for cough and shortness of breath.    Cardiovascular:  Negative for chest pain  and leg swelling.   Gastrointestinal:  Negative for abdominal distention and abdominal pain.   Objective:     Vital Signs (Most Recent):  Temp: 97.9 °F (36.6 °C) (04/27/23 1127)  Pulse: 82 (04/27/23 1246)  Resp: 18 (04/27/23 1246)  BP: (!) 89/60 (04/27/23 1127)  SpO2: (!) 94 % (04/27/23 1246) Vital Signs (24h Range):  Temp:  [97.8 °F (36.6 °C)-98.9 °F (37.2 °C)] 97.9 °F (36.6 °C)  Pulse:  [78-88] 82  Resp:  [16-20] 18  SpO2:  [94 %-100 %] 94 %  BP: ()/(59-69) 89/60     Weight: 60.6 kg (133 lb 9.6 oz)  Body mass index is 20.31 kg/m².    Intake/Output Summary (Last 24 hours) at 4/27/2023 1416  Last data filed at 4/27/2023 1253  Gross per 24 hour   Intake 1740 ml   Output 500 ml   Net 1240 ml        Physical Exam  Constitutional:       General: He is not in acute distress.     Appearance: He is ill-appearing. He is not toxic-appearing or diaphoretic.   Cardiovascular:      Rate and Rhythm: Normal rate and regular rhythm.      Heart sounds: No murmur heard.    No gallop.   Pulmonary:      Effort: Pulmonary effort is normal. No respiratory distress.      Breath sounds: Rales present. No wheezing.   Abdominal:      General: Bowel sounds are normal. There is no distension.      Palpations: Abdomen is soft.      Tenderness: There is no abdominal tenderness.       Significant Labs: All pertinent labs within the past 24 hours have been reviewed.    Significant Imaging: I have reviewed all pertinent imaging results/findings within the past 24 hours.      Assessment/Plan:      * Acute on chronic respiratory failure with hypoxia and hypercapnia  At home is on 5L O2 via trach and O2 sats typically in high 80s low 90s. Worsening respiration at admission due to aspiration of blood and likely food aspiration. Required ventilator from 3/21 to 3/23, and required it again 4/11. Has received multiple courses of antibiotics for pneumonia as well as thoracentesis c/b trapped lung and reaccumulation of fluid.   - furosemide as  "tolerated   - finishing bactrim course  - wean O2 as tolerated        Postprocedural pneumothorax  Likely trapped lung      Pleural effusion  Pulmonology consulted who recommended thoracentesis. Performed 4/18 by IR. Pt subsequently developed persistent ptx, but respiratory wise no significant deterioration. Pt refused IR chest tube. Pleural effusion returned. Fluid studies show borderline transudate based on protein and ULN of LDH, however no serum LDH was drawn on the day of thoracentesis.  - monitor  - furosemide as tolerated          Retroperitoneal hematoma  RP hematoma discovered after LHC. See "acute blood loss anemia"      NSTEMI (non-ST elevated myocardial infarction)  See "coronary artery disease"      Anxiety  Hydroxyzine, ativan, and trazadone all led to worsening encephalopathy. Anxiety now improved  - may try mirtazapine if this recurs    Hemoptysis  Resolved after reinflation of trach collar. ENT scopes without visualization of bleeding source    Pneumonia  3/22 Respiratory culture: Stenotrophomonas, Achromobacter--> on bactrim x14 days for this  4/11 Respiratory culture: pseudomonas -> completed cefepime course    PEG (percutaneous endoscopic gastrostomy) status  Continue medications via PEG. Does not take oral intake.   -On isosource 1.5 6 days daily with 120cc free water flushes via wife  -Will continue home tube feedings, with nutrition consulted    Tracheostomy present  See respiratory failure     ACP (advance care planning)  Advance Care Planning     Date: 04/10/2023  Palliative consulted, reviewed documentation. Full code. Needs home palliative follow up upon discharge. Time spent reviewing on 4/10/23= 5 minutes          Severe protein-calorie malnutrition  RD following. On tube feeds.      Secondary malignant neoplasm of cervical lymph node  Noted      Acute blood loss anemia  Blood loss this admission from hemoptysis (improved w/ inflation of trach collar) and retroperitoneal bleed.   - " hemoglobin stable  - CBC MWF    COPD exacerbation  No evidence of current exacerbation      Other hyperlipidemia  -Continue home statin    Primary hypertension  Previously hypotensive, now normotensive  - continue metoprolol with hold parameters    Coronary artery disease involving native coronary artery of native heart with unstable angina pectoris  Troponin elevated when pt was upgraded to the ICU. At that time it was thought to be due to demand, however he had persistent chest pain concerning for ACS. Was started on heparin gtt and tolerated. Subsequently went to [facility] which showed diffuse disease. Pt not a candidate for CABG. He subsequently underwent PCI of LAD and LCx.  - Continue asa, plavix, statin    Squamous cell cancer of tongue  Dx 2021, now s/p total glossectomy, bilateral neck dissection, bilateral cervical facial flaps and anterolateral thigh flap reconstruction of glossectomy defect 1/4/22. Completed adjuvant chemoradiation. Had trach changed by ENT 2 days prior to admit and scope at that time showed no signs of bleeding despite hemoptysis present on admission.     Carotid stenosis  Continues on asa, plavix, statin       Peripheral vascular disease  Continues on asa, plavix, statin         VTE Risk Mitigation (From admission, onward)         Ordered     heparin (porcine) injection 5,000 Units  Every 8 hours         04/20/23 1325     IP VTE LOW RISK PATIENT  Once         04/11/23 0909     Place sequential compression device  Until discontinued         03/18/23 1620     Place NIKITA hose  Until discontinued         03/18/23 1620                Discharge Planning   NISA: 4/25/2023     Code Status: Full Code   Is the patient medically ready for discharge?:     Reason for patient still in hospital (select all that apply): Patient trending condition, Laboratory test, Treatment, Consult recommendations and Pending disposition  Discharge Plan A: Other (tbd)   Discharge Delays: (!) Post-Acute  Set-up              Kelby Sargent MD  Department of [facility] Medicine   [facility] - [place] - Telemetry

## 2023-04-27 NOTE — PLAN OF CARE
Problem: Physical Therapy  Goal: Physical Therapy Goal  Description: Goals to be met by: 23     Patient will increase functional independence with mobility by performin. Supine to sit with Modified [place]  2. Sit to stand transfer with Supervision  3. Bed to chair transfer with Supervision   4. Gait  x 10-15 feet with Contact Guard Assistance using Rolling Walker.   5. Lower extremity exercise program x10 reps per handout, with supervision  6. W/C propulsion x 25' with Supervision    Outcome: Ongoing, Progressing

## 2023-04-27 NOTE — ASSESSMENT & PLAN NOTE
Advance Care Planning     Date: 04/10/2023  Palliative consulted, reviewed documentation. Full code. Needs home palliative follow up upon discharge. Time spent reviewing on 4/10/23= 5 minutes          Pt received two Allergy shots, 0.5 ml of vial 1: 1000 (green) waited in clinic 30 min, no reactions Documented in XIS.

## 2023-04-27 NOTE — PT/OT/SLP PROGRESS
Physical Therapy Treatment    Patient Name:  Fareed Richard Jr.   MRN:  [id number]    Recommendations:     Discharge Recommendations:  (PT at next level of care, most likely will still need LTAC to wean down O2)  Discharge Equipment Recommendations: none  Barriers to discharge:  on 10 L 40% trach collar     Assessment:     Fareed Richard Jr. is a 73 y.o. male admitted with a medical diagnosis of Acute on chronic respiratory failure with hypoxia and hypercapnia.  He presents with the following impairments/functional limitations: weakness, impaired endurance, impaired functional mobility, gait instability, impaired balance, decreased safety awareness, impaired skin, impaired cardiopulmonary response to activity.    Rehab Prognosis: Good; patient would benefit from acute skilled PT services to address these deficits and reach maximum level of function.    Recent Surgery: Procedure(s) (LRB):  Angioplasty-coronary (Left)  Aortogram, Aortic Arch  AORTOGRAM, WITH SERIALOGRAPHY (N/A)  Stent, Coronary, Multi Vessel 31 Days Post-Op    Plan:     During this hospitalization, patient to be seen  (2-3x/wk) to address the identified rehab impairments via gait training, therapeutic activities, therapeutic exercises, wheelchair management/training and progress toward the following goals:    Plan of Care Expires:  05/05/23    Subjective     Chief Complaint: fatigue with activity  Patient/Family Comments/goals: Pt agreed to participate.  Pain/Comfort:  Pain Rating 1: 0/10      Objective:     Communicated with nurse Crook prior to session.  Patient found HOB elevated with telemetry, bed alarm, flores catheter, Tracheostomy, oxygen, PICC line, G/J tube upon PT entry to room.     General Precautions: Standard, fall, respiratory, NPO  Orthopedic Precautions: N/A  Braces: N/A  Respiratory Status:  on 10 L 40% trach collar      Functional Mobility:  Bed Mobility:     Supine to Sit: supervision  Transfers: Gait belt don prior OOB activity    "  Sit to Stand: from EOB with supervision with rolling walker  Bed to Chair: stand by assistance with rolling walker using Step Transfer  Gait: Pt ambulated ~4 ft,~16 ft using RW with SBA for safety. Pt with flexed posture, decreased tammi/step length; required v/c for ; limited gait training 2* fatigue SpO2 89-95%  Balance: Good Sitting; Fair+ Standing      AM-PAC 6 CLICK MOBILITY  Turning over in bed (including adjusting bedclothes, sheets and blankets)?: 4  Sitting down on and standing up from a chair with arms (e.g., wheelchair, bedside commode, etc.): 4  Moving from lying on back to sitting on the side of the bed?: 4  Moving to and from a bed to a chair (including a wheelchair)?: 4  Need to walk in hospital room?: 3  Climbing 3-5 steps with a railing?: 3  Basic Mobility Total Score: 22       Treatment & Education:  Educated pt on "call before you fall", benefits of OOB activity and performing BLE ex throughout the day, pt verbalized understanding.  BLE ex in seated 10 reps AP, LAQ, Marching, PS (SpO2 94-97%)    Patient left up in chair in reclined position on green cushion with BLE offloaded by pillow, all lines intact, call button in reach, nurse notified, and Pharmacist present.    GOALS:   Multidisciplinary Problems       Physical Therapy Goals          Problem: Physical Therapy    Goal Priority Disciplines Outcome Goal Variances Interventions   Physical Therapy Goal     PT, PT/OT Ongoing, Progressing     Description: Goals to be met by: 23     Patient will increase functional independence with mobility by performin. Supine to sit with Modified Thomas  2. Sit to stand transfer with Supervision  3. Bed to chair transfer with Supervision   4. Gait  x 10-15 feet with Contact Guard Assistance using Rolling Walker.   5. Lower extremity exercise program x10 reps per handout, with supervision  6. W/C propulsion x 25' with Supervision                         Time Tracking:     PT " Received On: 04/27/23  PT Start Time: 0935     PT Stop Time: 1013  PT Total Time (min): 38 min     Billable Minutes: Therapeutic Activity 25 min and Therapeutic Exercise 13 min    Treatment Type: Treatment  PT/PTA: PTA     Number of PTA visits since last PT visit: 3     04/27/2023

## 2023-04-27 NOTE — PLAN OF CARE
CM spoke to pt's wife, Bridgett and pt and they both stated they don't want to go to a skilled nursing facility. They would prefer home health.

## 2023-04-27 NOTE — PT/OT/SLP PROGRESS
Occupational Therapy      Patient Name:  Fareed Richard .   MRN:  [id number]    Patient not seen today secondary because patient stated he was too tired to do anything. Will follow-up 4/28/23. .    4/27/2023

## 2023-04-27 NOTE — ASSESSMENT & PLAN NOTE
Troponin elevated when pt was upgraded to the ICU. At that time it was thought to be due to demand, however he had persistent chest pain concerning for ACS. Was started on heparin gtt and tolerated. Subsequently went to [facility] which showed diffuse disease. Pt not a candidate for CABG. He subsequently underwent PCI of LAD and LCx.  - Continue asa, plavix, statin

## 2023-04-27 NOTE — SUBJECTIVE & OBJECTIVE
Interval History: No events overnight. Weaning O2    Review of Systems   Constitutional:  Negative for chills and fever.   Respiratory:  Negative for cough and shortness of breath.    Cardiovascular:  Negative for chest pain and leg swelling.   Gastrointestinal:  Negative for abdominal distention and abdominal pain.   Objective:     Vital Signs (Most Recent):  Temp: 97.9 °F (36.6 °C) (04/27/23 1127)  Pulse: 82 (04/27/23 1246)  Resp: 18 (04/27/23 1246)  BP: (!) 89/60 (04/27/23 1127)  SpO2: (!) 94 % (04/27/23 1246) Vital Signs (24h Range):  Temp:  [97.8 °F (36.6 °C)-98.9 °F (37.2 °C)] 97.9 °F (36.6 °C)  Pulse:  [78-88] 82  Resp:  [16-20] 18  SpO2:  [94 %-100 %] 94 %  BP: ()/(59-69) 89/60     Weight: 60.6 kg (133 lb 9.6 oz)  Body mass index is 20.31 kg/m².    Intake/Output Summary (Last 24 hours) at 4/27/2023 1416  Last data filed at 4/27/2023 1253  Gross per 24 hour   Intake 1740 ml   Output 500 ml   Net 1240 ml        Physical Exam  Constitutional:       General: He is not in acute distress.     Appearance: He is ill-appearing. He is not toxic-appearing or diaphoretic.   Cardiovascular:      Rate and Rhythm: Normal rate and regular rhythm.      Heart sounds: No murmur heard.    No gallop.   Pulmonary:      Effort: Pulmonary effort is normal. No respiratory distress.      Breath sounds: Rales present. No wheezing.   Abdominal:      General: Bowel sounds are normal. There is no distension.      Palpations: Abdomen is soft.      Tenderness: There is no abdominal tenderness.       Significant Labs: All pertinent labs within the past 24 hours have been reviewed.    Significant Imaging: I have reviewed all pertinent imaging results/findings within the past 24 hours.

## 2023-04-27 NOTE — PROGRESS NOTES
[facility]  Nurses Note -- 4 Eyes      4/27/2023       Skin assessed on: Q Shift      [x] No Pressure Injuries Present    [x]Prevention Measures Documented    [] Yes LDA  for Pressure Injury Previously documented     [] Yes New Pressure Injury Discovered   [] LDA for New Pressure Injury Added      Attending RN:  Armaan Valentine RN     Second RN:  Nila Ryan RN

## 2023-04-28 LAB
ALBUMIN SERPL BCP-MCNC: 2.2 G/DL (ref 3.5–5.2)
ALP SERPL-CCNC: 89 U/L (ref 55–135)
ALT SERPL W/O P-5'-P-CCNC: 22 U/L (ref 10–44)
ANION GAP SERPL CALC-SCNC: 9 MMOL/L (ref 8–16)
AST SERPL-CCNC: 29 U/L (ref 10–40)
BASOPHILS # BLD AUTO: 0.03 K/UL (ref 0–0.2)
BASOPHILS NFR BLD: 0.7 % (ref 0–1.9)
BILIRUB SERPL-MCNC: 0.3 MG/DL (ref 0.1–1)
BUN SERPL-MCNC: 24 MG/DL (ref 8–23)
CALCIUM SERPL-MCNC: 8.8 MG/DL (ref 8.7–10.5)
CHLORIDE SERPL-SCNC: 99 MMOL/L (ref 95–110)
CO2 SERPL-SCNC: 27 MMOL/L (ref 23–29)
CREAT SERPL-MCNC: 0.8 MG/DL (ref 0.5–1.4)
DIFFERENTIAL METHOD: ABNORMAL
EOSINOPHIL # BLD AUTO: 0.2 K/UL (ref 0–0.5)
EOSINOPHIL NFR BLD: 4.7 % (ref 0–8)
ERYTHROCYTE [DISTWIDTH] IN BLOOD BY AUTOMATED COUNT: 16.2 % (ref 11.5–14.5)
EST. GFR  (NO RACE VARIABLE): >60 ML/MIN/1.73 M^2
GLUCOSE SERPL-MCNC: 99 MG/DL (ref 70–110)
HCT VFR BLD AUTO: 27.7 % (ref 40–54)
HGB BLD-MCNC: 8.6 G/DL (ref 14–18)
IMM GRANULOCYTES # BLD AUTO: 0.01 K/UL (ref 0–0.04)
IMM GRANULOCYTES NFR BLD AUTO: 0.2 % (ref 0–0.5)
LYMPHOCYTES # BLD AUTO: 0.5 K/UL (ref 1–4.8)
LYMPHOCYTES NFR BLD: 13.2 % (ref 18–48)
MCH RBC QN AUTO: 28.4 PG (ref 27–31)
MCHC RBC AUTO-ENTMCNC: 31 G/DL (ref 32–36)
MCV RBC AUTO: 91 FL (ref 82–98)
MONOCYTES # BLD AUTO: 0.5 K/UL (ref 0.3–1)
MONOCYTES NFR BLD: 11.7 % (ref 4–15)
NEUTROPHILS # BLD AUTO: 2.8 K/UL (ref 1.8–7.7)
NEUTROPHILS NFR BLD: 69.5 % (ref 38–73)
NRBC BLD-RTO: 0 /100 WBC
PLATELET # BLD AUTO: 261 K/UL (ref 150–450)
PMV BLD AUTO: 9.4 FL (ref 9.2–12.9)
POTASSIUM SERPL-SCNC: 4.4 MMOL/L (ref 3.5–5.1)
PROT SERPL-MCNC: 6.5 G/DL (ref 6–8.4)
RBC # BLD AUTO: 3.03 M/UL (ref 4.6–6.2)
SODIUM SERPL-SCNC: 135 MMOL/L (ref 136–145)
WBC # BLD AUTO: 4.02 K/UL (ref 3.9–12.7)

## 2023-04-28 PROCEDURE — 94761 N-INVAS EAR/PLS OXIMETRY MLT: CPT

## 2023-04-28 PROCEDURE — 99900026 HC AIRWAY MAINTENANCE (STAT)

## 2023-04-28 PROCEDURE — 25000003 PHARM REV CODE 250: Performed by: HOSPITALIST

## 2023-04-28 PROCEDURE — 94640 AIRWAY INHALATION TREATMENT: CPT

## 2023-04-28 PROCEDURE — 36415 COLL VENOUS BLD VENIPUNCTURE: CPT | Performed by: STUDENT IN AN ORGANIZED HEALTH CARE EDUCATION/TRAINING PROGRAM

## 2023-04-28 PROCEDURE — 85025 COMPLETE CBC W/AUTO DIFF WBC: CPT | Performed by: STUDENT IN AN ORGANIZED HEALTH CARE EDUCATION/TRAINING PROGRAM

## 2023-04-28 PROCEDURE — 99900035 HC TECH TIME PER 15 MIN (STAT)

## 2023-04-28 PROCEDURE — 27000221 HC OXYGEN, UP TO 24 HOURS

## 2023-04-28 PROCEDURE — A4216 STERILE WATER/SALINE, 10 ML: HCPCS | Performed by: HOSPITALIST

## 2023-04-28 PROCEDURE — 21400001 HC TELEMETRY ROOM

## 2023-04-28 PROCEDURE — 80053 COMPREHEN METABOLIC PANEL: CPT | Performed by: STUDENT IN AN ORGANIZED HEALTH CARE EDUCATION/TRAINING PROGRAM

## 2023-04-28 PROCEDURE — 25000242 PHARM REV CODE 250 ALT 637 W/ HCPCS: Performed by: INTERNAL MEDICINE

## 2023-04-28 PROCEDURE — 25000242 PHARM REV CODE 250 ALT 637 W/ HCPCS: Performed by: HOSPITALIST

## 2023-04-28 PROCEDURE — 63600175 PHARM REV CODE 636 W HCPCS: Performed by: STUDENT IN AN ORGANIZED HEALTH CARE EDUCATION/TRAINING PROGRAM

## 2023-04-28 PROCEDURE — 97530 THERAPEUTIC ACTIVITIES: CPT

## 2023-04-28 RX ORDER — FUROSEMIDE 40 MG/1
40 TABLET ORAL DAILY
Status: DISCONTINUED | OUTPATIENT
Start: 2023-04-29 | End: 2023-04-30 | Stop reason: HOSPADM

## 2023-04-28 RX ADMIN — MELATONIN TAB 3 MG 9 MG: 3 TAB at 10:04

## 2023-04-28 RX ADMIN — IPRATROPIUM BROMIDE AND ALBUTEROL SULFATE 3 ML: 2.5; .5 SOLUTION RESPIRATORY (INHALATION) at 07:04

## 2023-04-28 RX ADMIN — IPRATROPIUM BROMIDE AND ALBUTEROL SULFATE 3 ML: 2.5; .5 SOLUTION RESPIRATORY (INHALATION) at 04:04

## 2023-04-28 RX ADMIN — IPRATROPIUM BROMIDE AND ALBUTEROL SULFATE 3 ML: 2.5; .5 SOLUTION RESPIRATORY (INHALATION) at 11:04

## 2023-04-28 RX ADMIN — Medication 10 ML: at 12:04

## 2023-04-28 RX ADMIN — CHLORHEXIDINE GLUCONATE 0.12% ORAL RINSE 15 ML: 1.2 LIQUID ORAL at 12:04

## 2023-04-28 RX ADMIN — Medication 10 ML: at 06:04

## 2023-04-28 RX ADMIN — HEPARIN SODIUM 5000 UNITS: 5000 INJECTION INTRAVENOUS; SUBCUTANEOUS at 10:04

## 2023-04-28 RX ADMIN — ACETYLCYSTEINE 4 ML: 100 SOLUTION ORAL; RESPIRATORY (INHALATION) at 04:04

## 2023-04-28 RX ADMIN — ASPIRIN 81 MG CHEWABLE TABLET 81 MG: 81 TABLET CHEWABLE at 12:04

## 2023-04-28 RX ADMIN — ACETYLCYSTEINE 4 ML: 100 SOLUTION ORAL; RESPIRATORY (INHALATION) at 10:04

## 2023-04-28 RX ADMIN — IPRATROPIUM BROMIDE AND ALBUTEROL SULFATE 3 ML: 2.5; .5 SOLUTION RESPIRATORY (INHALATION) at 10:04

## 2023-04-28 RX ADMIN — FAMOTIDINE 20 MG: 10 INJECTION, SOLUTION INTRAVENOUS at 12:04

## 2023-04-28 RX ADMIN — ATORVASTATIN CALCIUM 80 MG: 40 TABLET, FILM COATED ORAL at 12:04

## 2023-04-28 RX ADMIN — CLOPIDOGREL BISULFATE 75 MG: 75 TABLET ORAL at 12:04

## 2023-04-28 RX ADMIN — HEPARIN SODIUM 5000 UNITS: 5000 INJECTION INTRAVENOUS; SUBCUTANEOUS at 01:04

## 2023-04-28 RX ADMIN — FERROUS SULFATE TAB 325 MG (65 MG ELEMENTAL FE) 1 EACH: 325 (65 FE) TAB at 12:04

## 2023-04-28 RX ADMIN — HEPARIN SODIUM 5000 UNITS: 5000 INJECTION INTRAVENOUS; SUBCUTANEOUS at 05:04

## 2023-04-28 RX ADMIN — ACETYLCYSTEINE 4 ML: 100 SOLUTION ORAL; RESPIRATORY (INHALATION) at 07:04

## 2023-04-28 RX ADMIN — FAMOTIDINE 20 MG: 10 INJECTION, SOLUTION INTRAVENOUS at 10:04

## 2023-04-28 NOTE — ASSESSMENT & PLAN NOTE
Troponin elevated when pt was upgraded to the ICU. At that time it was thought to be due to demand, however he had persistent chest pain concerning for ACS. Was started on heparin gtt and tolerated. Subsequently went to [facility] which showed diffuse disease. Pt not a candidate for CABG. He subsequently underwent PCI of LAD and LCx.  - Continue asa, plavix, statin

## 2023-04-28 NOTE — PLAN OF CARE
Problem: Occupational Therapy  Goal: Occupational Therapy Goal  Description: Goals to be met by: 5/3/23     Patient will increase functional independence with ADLs by performing:    LE Dressing with Modified Parrish and Supervision.  Grooming while standing at sink with Stand-by Assistance and seated rest breaks as needed.   Toileting from toilet with Stand-by Assistance as needed for hygiene and clothing management.   Step transfer with Modified Parrish.   Toilet transfer to toilet with Modified Parrish.   Upper extremity exercise program x10 reps per handout, with assistance as needed.  Implementation of PLB and energy conservation strategies for safe performance w/ ADLs and functional mobility.     Outcome: Ongoing, Progressing

## 2023-04-28 NOTE — PROGRESS NOTES
[facility] Medicine  Progress Note    Patient Name: Fareed Richard Jr.  MRN: [id number]  Patient Class: IP- Inpatient   Admission Date: 3/18/2023  Length of Stay: 40 days  Attending Physician: Kelby Sargent MD  Primary Care Provider: Kodi Tubbs MD        Subjective:     Principal Problem:Acute on chronic respiratory failure with hypoxia and hypercapnia        HPI:  Fareed Richard Jr. 73 y.o. 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  Per his wife 4 days ago he began to have pink tinged sputum via his trach.  He was seen by his ENT 2 days ago with a scope exam and a trach exchange without evidence of bleeding.  This morning at 3:00 a.m. he developed bright red blood via trach which resolved without intervention.  It is since not recurred.  He takes a daily aspirin.  He receives all medications via G-tube.  His tube feeding regimen consists of 6 cans of Isosource daily with 120 cc free water boluses.  He denies fever chest pain shortness of breath nausea vomiting abdominal pain leg swelling syncope dizziness dysuria melena hematuria hematemesis.    In the ED, hemoglobin 7.6 INR 1.0 BUN 50 creatinine 0.7  troponin negative BRCA negative O positive type and screen chest x-ray without detrimental change hypotensive to 85/54.      Overview/Hospital Course:  72 yo M w squamous CA of tongue s/p glossectomy and reconstructive flap with trach, CAD, chronic hypoxic respiratory failure (on 5L at home) and with peg presented for evaluation of hemoptysis 2 days after tracheostomy change in clinic. Transferred to ICU 3/19 when markedly hypotensive.  Unclear if due to hemorrhage, cardiogenic or septic shock.  Did require PRBC and TXA nebs but didn't seem like sufficient bleeding to cause shock.  Bleeding stopped and ENT following.  CTA shows either significant mucus plugging or  bibasilar pneumonia - pulmonology favored pneumonia.  Trach aspirate with Stenotrophomonas, Achromobacter, and Candida. Also urine culture growing Enterococcus.  He is on broad spectrum antibiotics including bactrim. Developed NSTEMI. Cardiology consulted. [facility] 3/23 which showed distal LM 30%, mild LAD 90% bifurcation lesion with D1, Cx mid 80%, RCA moderate diffuse disease with long 70% and some left to right collateral. All vessels heavily calcified. Not a candidate for CABG but plan for staged PCI. Continued on heparin drip, asa/plavix. Vasopressors weaned. [facility] 3/27 PCI to LAD and LCx. Post operatively he was hemodynamically unstable and anemic. Stat CTA showed RP hematoma near L kidney without acute bleeding. Pt transfused supportively. He improved over the next few days. Stepped down to floor. Remains on trach collar, higher than home O2 requirements. He has delirium as well and poor sleep. Attempted trazodone. The following morning, he was much more lethargic and was transferred to ICU for worsening acute on chronic hypoxic/hypercapnic respiratory failure. This improved after 1 day on the vent.      Trach aspirate with GNR; remains on cefepime in addition to bactrim for prior Stenotrophomonas. Improving at 98%, wean O2. Appears Steno and Achro being effectively treated with bactrim, now growing pseudomonas and has been on cefepime, improved. Abx stopped. Pt had thoracentesis with improvement in breathing, but developed a small ptx. IR consulted for CT placement, but pt refused. No longer requiring ventilatory support at night and PTX improving. Initial plan was for ltac placement, but no long needing vent so does not qualify    O2 continued to wean on the floor. Improving with PT/OT.      Interval History: No events overnight. Weaning O2 today.    Review of Systems   Constitutional:  Negative for chills and fever.   Respiratory:  Positive for cough and shortness of breath.    Cardiovascular:  Negative for chest  pain and leg swelling.   Gastrointestinal:  Negative for abdominal distention and abdominal pain.   Objective:     Vital Signs (Most Recent):  Temp: 98.1 °F (36.7 °C) (04/28/23 1120)  Pulse: 90 (04/28/23 1143)  Resp: 17 (04/28/23 1143)  BP: (!) 101/57 (04/28/23 1120)  SpO2: (!) 92 % (04/28/23 1143)   Vital Signs (24h Range):  Temp:  [97.5 °F (36.4 °C)-98.1 °F (36.7 °C)] 98.1 °F (36.7 °C)  Pulse:  [78-92] 90  Resp:  [16-19] 17  SpO2:  [91 %-97 %] 92 %  BP: (101-126)/(57-67) 101/57     Weight: 59.8 kg (131 lb 13.4 oz)  Body mass index is 20.05 kg/m².    Intake/Output Summary (Last 24 hours) at 4/28/2023 1218  Last data filed at 4/28/2023 0800  Gross per 24 hour   Intake 300 ml   Output 250 ml   Net 50 ml      Physical Exam  Constitutional:       General: He is not in acute distress.     Appearance: He is ill-appearing. He is not toxic-appearing or diaphoretic.   Cardiovascular:      Rate and Rhythm: Normal rate.      Heart sounds: No murmur heard.    No gallop.   Pulmonary:      Effort: Pulmonary effort is normal. No respiratory distress.      Breath sounds: Rales present. No wheezing.      Comments: O2 via trach collar  Abdominal:      General: Bowel sounds are normal. There is no distension.      Palpations: Abdomen is soft.      Tenderness: There is no abdominal tenderness.       Significant Labs: All pertinent labs within the past 24 hours have been reviewed.    Significant Imaging: I have reviewed all pertinent imaging results/findings within the past 24 hours.      Assessment/Plan:      * Acute on chronic respiratory failure with hypoxia and hypercapnia  At home is on 5L O2 via trach and O2 sats typically in high 80s low 90s. Worsening respiration at admission due to aspiration of blood and likely food aspiration. Required ventilator from 3/21 to 3/23, and required it again 4/11. Has received multiple courses of antibiotics for pneumonia as well as thoracentesis c/b trapped lung and reaccumulation of fluid.   -  "furosemide as tolerated   - finishing bactrim course  - wean O2 as tolerated        Postprocedural pneumothorax  Likely trapped lung      Pleural effusion  Pulmonology consulted who recommended thoracentesis. Performed 4/18 by IR. Pt subsequently developed persistent ptx, but respiratory wise no significant deterioration. Pt refused IR chest tube. Pleural effusion returned. Fluid studies show borderline transudate based on protein and ULN of LDH, however no serum LDH was drawn on the day of thoracentesis.  - monitor  - furosemide as tolerated          Retroperitoneal hematoma  RP hematoma discovered after LHC. See "acute blood loss anemia"      NSTEMI (non-ST elevated myocardial infarction)  See "coronary artery disease"      Anxiety  Hydroxyzine, ativan, and trazadone all led to worsening encephalopathy. Anxiety now improved  - may try mirtazapine if this recurs    Hemoptysis  Resolved after reinflation of trach collar. ENT scopes without visualization of bleeding source    Pneumonia  3/22 Respiratory culture: Stenotrophomonas, Achromobacter--> on bactrim x14 days for this  4/11 Respiratory culture: pseudomonas -> completed cefepime course    PEG (percutaneous endoscopic gastrostomy) status  Continue medications via PEG. Does not take oral intake.   -On isosource 1.5 6 days daily with 120cc free water flushes via wife  -Will continue home tube feedings, with nutrition consulted    Tracheostomy present  See respiratory failure     ACP (advance care planning)  Advance Care Planning     Date: 04/10/2023  Palliative consulted, reviewed documentation. Full code. Needs home palliative follow up upon discharge. Time spent reviewing on 4/10/23= 5 minutes          Severe protein-calorie malnutrition  RD following. On tube feeds.      Secondary malignant neoplasm of cervical lymph node  Noted      Acute blood loss anemia  Blood loss this admission from hemoptysis (improved w/ inflation of trach collar) and retroperitoneal " bleed.   - hemoglobin stable  - CBC MWF    COPD exacerbation  No evidence of current exacerbation      Other hyperlipidemia  -Continue home statin    Primary hypertension  Holding metoprolol    Coronary artery disease involving native coronary artery of native heart with unstable angina pectoris  Troponin elevated when pt was upgraded to the ICU. At that time it was thought to be due to demand, however he had persistent chest pain concerning for ACS. Was started on heparin gtt and tolerated. Subsequently went to [facility] which showed diffuse disease. Pt not a candidate for CABG. He subsequently underwent PCI of LAD and LCx.  - Continue asa, plavix, statin    Squamous cell cancer of tongue  Dx 2021, now s/p total glossectomy, bilateral neck dissection, bilateral cervical facial flaps and anterolateral thigh flap reconstruction of glossectomy defect 1/4/22. Completed adjuvant chemoradiation. Had trach changed by ENT 2 days prior to admit and scope at that time showed no signs of bleeding despite hemoptysis present on admission.     Carotid stenosis  Continues on asa, plavix, statin       Peripheral vascular disease  Continues on asa, plavix, statin         VTE Risk Mitigation (From admission, onward)         Ordered     heparin (porcine) injection 5,000 Units  Every 8 hours         04/20/23 1325     IP VTE LOW RISK PATIENT  Once         04/11/23 0909     Place sequential compression device  Until discontinued         03/18/23 1620     Place NIKITA hose  Until discontinued         03/18/23 1620                Discharge Planning   NISA: 4/25/2023     Code Status: Full Code   Is the patient medically ready for discharge?:     Reason for patient still in hospital (select all that apply): Patient trending condition, Laboratory test, Treatment, Consult recommendations and Pending disposition  Discharge Plan A: Home Health   Discharge Delays: (!) Procedure Scheduling (IR, OR, Labs, Echo, Cath, Echo, EEG)              Kelby Sargent,  MD  Department of Hospital Medicine   [facility] - Telemetry

## 2023-04-28 NOTE — PLAN OF CARE
CM spoke to pt's spouse, Bridgett to discuss discharge planning. Per MD, pt may be ready for discharge on Sunday. Per pt's spouse, pt has trach supplies ([employer]).  Pt also has the following DME: rw, bedside commode, hospital bed, rollator, wc and shower chair. CM will follow up as needed.     04/28/23 1674   Discharge Reassessment   Assessment Type Discharge Planning Reassessment   Did the patient's condition or plan change since previous assessment? Yes   Discharge Plan discussed with: Spouse/sig other   Name(s) and Number(s) Bridgett - spouse [phone]   Discharge Plan A Home Health   Discharge Plan B Home with family   DME Needed Upon Discharge  none   Discharge Barriers Identified None   Why the patient remains in the hospital Requires continued medical care   Post-Acute Status   Post-Acute Authorization Home Health   Home Health Status Pending post-acute provider review/more information requested   Patient choice form signed by patient/caregiver List from System Post-Acute Care   Discharge Delays (!) Procedure Scheduling (IR, OR, Labs, Echo, Cath, Echo, EEG)

## 2023-04-28 NOTE — NURSING
[facility]  Nurses Note -- 4 Eyes      4/27/2023       Skin assessed on: Q Shift      [x] No Pressure Injuries Present    [x]Prevention Measures Documented    [] Yes LDA  for Pressure Injury Previously documented     [] Yes New Pressure Injury Discovered   [] LDA for New Pressure Injury Added      Attending RN:  Nila Thomson RN     Second RN:  Armaan Valentine RN

## 2023-04-28 NOTE — CARE UPDATE
Weaning trial successful per physician request. Pt originally on 10L/40% and is now on 5L/28%. RR within normal limits, SpO2 >88%, pt does not report SOB at this time. RN made aware. Will continue to monitor.

## 2023-04-28 NOTE — PROGRESS NOTES
[facility]  Nurses Note -- 4 Eyes      4/28/2023       Skin assessed on: Q Shift      [x] No Pressure Injuries Present    [x]Prevention Measures Documented    [] Yes LDA  for Pressure Injury Previously documented     [] Yes New Pressure Injury Discovered   [] LDA for New Pressure Injury Added      Attending RN:  Armaan Valentine RN     Second RN:  Nila Ryan RN

## 2023-04-28 NOTE — CARE UPDATE
Pt remains on 5L/28% trach collar sitting in the chair at this time. SpO2 >88%, pt resting comfortably. When asked about trach suctioning education pt stated he and his wife know how to perform tracheal suctioning. When asked if pt would like to go over the steps/technique he shook his head no.   Suctioning performed per RT along with trach care.

## 2023-04-28 NOTE — PT/OT/SLP PROGRESS
Occupational Therapy   Treatment    Name: Fareed Richard Jr.  MRN: [id number]  Admitting Diagnosis:  Acute on chronic respiratory failure with hypoxia and hypercapnia  32 Days Post-Op    Recommendations:     Discharge Recommendations:  (If d/c home, HHOT)  Discharge Equipment Recommendations:  none  Barriers to discharge:   (None; pt now on 5L O2 NC (w/ Trach collar) with SPO2 >87% with exertion)    Assessment:     Fareed Richard Jr. is a 73 y.o. male with a medical diagnosis of Acute on chronic respiratory failure with hypoxia and hypercapnia.  Performance deficits affecting function are weakness, impaired endurance, impaired self care skills, gait instability, impaired functional mobility, decreased lower extremity function, decreased safety awareness, decreased ROM, impaired cardiopulmonary response to activity.    Pt w/ good progress this date as he was able to ambulate functional distances in room (58 ft total w/ SBA and use of RW; pt w/ SPO2 85-90% with exertion on 5L O2 NC, recovering to 91% w/ seated rest breaks. Pt tolerated tx session well and will continue to benefit from skilled acute OT services to maximize functional capacity for safe performance w/ ADLs and functional mobility.     Rehab Prognosis:  Good; patient would benefit from acute skilled OT services to address these deficits and reach maximum level of function.       Plan:     Patient to be seen 3 x/week to address the above listed problems via self-care/home management, therapeutic activities, therapeutic exercises  Plan of Care Expires: 05/03/23  Plan of Care Reviewed with: patient    Subjective     Chief Complaint: None stated   Patient/Family Comments/goals: Agreeable to participate   Pain/Comfort:  Pain Rating 1: 0/10  Pain Rating Post-Intervention 1: 0/10    Objective:     Communicated with: Nurse prior to session.  Patient found HOB elevated with telemetry, bed alarm, flores catheter, Tracheostomy, oxygen, PICC line, G/J tube upon OT entry  to room.    General Precautions: Standard, fall, respiratory, NPO    Orthopedic Precautions:N/A  Braces: N/A  Respiratory Status: 5L w/ Trach Collar      Occupational Performance:     Bed Mobility:    Patient completed Scooting/Bridging with supervision  Patient completed Supine to Sit with supervision     Functional Mobility/Transfers:  Patient completed Sit <> Stand Transfer x 2  trials from EOB and with stand by assistance  with  rolling walker   Patient completed Bed <> Chair Transfer using Step Transfer technique with stand by assistance with rolling walker  Functional Mobility: Pt was able to ambulate functional distances in room (22 ft x 2 trials and 14 ft x 1 trial) with CBA and use of RW; pt required seated rest break in between each trial.     Activities of Daily Living:  Upper Body Dressing: minimum assistance for donning gown over back   Toileting: total assistance for donning brief in standing w/ BUE support on RW    [facility] 6 Click ADL: 21    Treatment & Education:  -Pt performed ADLs and functional mobility as note above.   -Pt educated on safe functional mobility and ADL performance.   -Pt educated on importance of PLB and energy conservation.   -Questions and concerns addressed within scope.     Patient left up in chair with all lines intact and call button in reach    GOALS:   Multidisciplinary Problems       Occupational Therapy Goals          Problem: Occupational Therapy    Goal Priority Disciplines Outcome Interventions   Occupational Therapy Goal     OT, PT/OT Ongoing, Progressing    Description: Goals to be met by: 5/3/23     Patient will increase functional independence with ADLs by performing:    LE Dressing with Modified [place] and Supervision.  Grooming while standing at sink with Stand-by Assistance and seated rest breaks as needed.   Toileting from toilet with Stand-by Assistance as needed for hygiene and clothing management.   Step transfer with Modified [place].   Toilet  transfer to toilet with Modified [place].   Upper extremity exercise program x10 reps per handout, with assistance as needed.  Implementation of PLB and energy conservation strategies for safe performance w/ ADLs and functional mobility.                          Time Tracking:     OT Date of Treatment: 04/28/23  OT Start Time: 1026  OT Stop Time: 1100  OT Total Time (min): 34 min    Billable Minutes:Therapeutic Activity 34  Total Time 34    OT/NELLY: OT          4/28/2023

## 2023-04-29 PROCEDURE — 94640 AIRWAY INHALATION TREATMENT: CPT

## 2023-04-29 PROCEDURE — 25000003 PHARM REV CODE 250: Performed by: HOSPITALIST

## 2023-04-29 PROCEDURE — 25000242 PHARM REV CODE 250 ALT 637 W/ HCPCS: Performed by: HOSPITALIST

## 2023-04-29 PROCEDURE — 25000003 PHARM REV CODE 250: Performed by: STUDENT IN AN ORGANIZED HEALTH CARE EDUCATION/TRAINING PROGRAM

## 2023-04-29 PROCEDURE — 94761 N-INVAS EAR/PLS OXIMETRY MLT: CPT

## 2023-04-29 PROCEDURE — 63600175 PHARM REV CODE 636 W HCPCS: Performed by: STUDENT IN AN ORGANIZED HEALTH CARE EDUCATION/TRAINING PROGRAM

## 2023-04-29 PROCEDURE — 27200966 HC CLOSED SUCTION SYSTEM

## 2023-04-29 PROCEDURE — 94760 N-INVAS EAR/PLS OXIMETRY 1: CPT

## 2023-04-29 PROCEDURE — A4216 STERILE WATER/SALINE, 10 ML: HCPCS | Performed by: HOSPITALIST

## 2023-04-29 PROCEDURE — 21400001 HC TELEMETRY ROOM

## 2023-04-29 PROCEDURE — 25000242 PHARM REV CODE 250 ALT 637 W/ HCPCS: Performed by: INTERNAL MEDICINE

## 2023-04-29 PROCEDURE — 27000221 HC OXYGEN, UP TO 24 HOURS

## 2023-04-29 RX ADMIN — ASPIRIN 81 MG CHEWABLE TABLET 81 MG: 81 TABLET CHEWABLE at 08:04

## 2023-04-29 RX ADMIN — IPRATROPIUM BROMIDE AND ALBUTEROL SULFATE 3 ML: 2.5; .5 SOLUTION RESPIRATORY (INHALATION) at 12:04

## 2023-04-29 RX ADMIN — IPRATROPIUM BROMIDE AND ALBUTEROL SULFATE 3 ML: 2.5; .5 SOLUTION RESPIRATORY (INHALATION) at 04:04

## 2023-04-29 RX ADMIN — MELATONIN TAB 3 MG 9 MG: 3 TAB at 10:04

## 2023-04-29 RX ADMIN — FERROUS SULFATE TAB 325 MG (65 MG ELEMENTAL FE) 1 EACH: 325 (65 FE) TAB at 08:04

## 2023-04-29 RX ADMIN — ACETYLCYSTEINE 4 ML: 100 SOLUTION ORAL; RESPIRATORY (INHALATION) at 11:04

## 2023-04-29 RX ADMIN — Medication 10 ML: at 12:04

## 2023-04-29 RX ADMIN — FAMOTIDINE 20 MG: 10 INJECTION, SOLUTION INTRAVENOUS at 08:04

## 2023-04-29 RX ADMIN — IPRATROPIUM BROMIDE AND ALBUTEROL SULFATE 3 ML: 2.5; .5 SOLUTION RESPIRATORY (INHALATION) at 07:04

## 2023-04-29 RX ADMIN — HEPARIN SODIUM 5000 UNITS: 5000 INJECTION INTRAVENOUS; SUBCUTANEOUS at 06:04

## 2023-04-29 RX ADMIN — HEPARIN SODIUM 5000 UNITS: 5000 INJECTION INTRAVENOUS; SUBCUTANEOUS at 10:04

## 2023-04-29 RX ADMIN — ACETYLCYSTEINE 4 ML: 100 SOLUTION ORAL; RESPIRATORY (INHALATION) at 08:04

## 2023-04-29 RX ADMIN — FAMOTIDINE 20 MG: 10 INJECTION, SOLUTION INTRAVENOUS at 10:04

## 2023-04-29 RX ADMIN — IPRATROPIUM BROMIDE AND ALBUTEROL SULFATE 3 ML: 2.5; .5 SOLUTION RESPIRATORY (INHALATION) at 03:04

## 2023-04-29 RX ADMIN — ATORVASTATIN CALCIUM 80 MG: 40 TABLET, FILM COATED ORAL at 08:04

## 2023-04-29 RX ADMIN — CLOPIDOGREL BISULFATE 75 MG: 75 TABLET ORAL at 08:04

## 2023-04-29 RX ADMIN — HEPARIN SODIUM 5000 UNITS: 5000 INJECTION INTRAVENOUS; SUBCUTANEOUS at 01:04

## 2023-04-29 RX ADMIN — IPRATROPIUM BROMIDE AND ALBUTEROL SULFATE 3 ML: 2.5; .5 SOLUTION RESPIRATORY (INHALATION) at 11:04

## 2023-04-29 RX ADMIN — ACETYLCYSTEINE 4 ML: 100 SOLUTION ORAL; RESPIRATORY (INHALATION) at 04:04

## 2023-04-29 RX ADMIN — Medication 10 ML: at 06:04

## 2023-04-29 RX ADMIN — IPRATROPIUM BROMIDE AND ALBUTEROL SULFATE 3 ML: 2.5; .5 SOLUTION RESPIRATORY (INHALATION) at 08:04

## 2023-04-29 RX ADMIN — FUROSEMIDE 40 MG: 40 TABLET ORAL at 08:04

## 2023-04-29 NOTE — NURSING
[facility], [facility]  Nurses Note -- 4 Eyes      4/29/2023       Skin assessed on: Q Shift      [x] No Pressure Injuries Present    []Prevention Measures Documented    [] Yes LDA  for Pressure Injury Previously documented     [] Yes New Pressure Injury Discovered   [] LDA for New Pressure Injury Added      Attending RN:  Kirti Coronado RN     Second RN:  KAMILAH Frias

## 2023-04-29 NOTE — ASSESSMENT & PLAN NOTE
Troponin elevated when pt was upgraded to the ICU. At that time it was thought to be due to demand, however he had persistent chest pain concerning for ACS. Was started on heparin gtt and tolerated. Subsequently went to [facility] which showed diffuse disease. Pt not a candidate for CABG. He subsequently underwent PCI of LAD and LCx.  - Continue asa, plavix, statin

## 2023-04-29 NOTE — PLAN OF CARE
Problem: Adult Inpatient Plan of Care  Goal: Plan of Care Review  Outcome: Ongoing, Progressing  Goal: Patient-Specific Goal (Individualized)  Outcome: Ongoing, Progressing  Goal: Absence of Hospital-Acquired Illness or Injury  Outcome: Ongoing, Progressing  Intervention: Identify and Manage Fall Risk  Flowsheets (Taken 4/29/2023 0627)  Safety Promotion/Fall Prevention:   assistive device/personal item within reach   side rails raised x 2  Intervention: Prevent Skin Injury  Flowsheets (Taken 4/29/2023 0627)  Body Position:   position changed independently   position maintained  Skin Protection: adhesive use limited  Intervention: Prevent and Manage VTE (Venous Thromboembolism) Risk  Flowsheets (Taken 4/29/2023 0627)  Activity Management: Rolling - L1  Range of Motion: active ROM (range of motion) encouraged  Goal: Optimal Comfort and Wellbeing  Outcome: Ongoing, Progressing  Intervention: Provide Person-Centered Care  Flowsheets (Taken 4/29/2023 0627)  Trust Relationship/Rapport:   care explained   choices provided   emotional support provided   empathic listening provided  Goal: Readiness for Transition of Care  Outcome: Ongoing, Progressing     Problem: Fall Injury Risk  Goal: Absence of Fall and Fall-Related Injury  Outcome: Ongoing, Progressing  Intervention: Identify and Manage Contributors  Flowsheets (Taken 4/29/2023 0627)  Medication Review/Management: medications reviewed  Intervention: Promote Injury-Free Environment  Flowsheets (Taken 4/29/2023 0627)  Safety Promotion/Fall Prevention:   assistive device/personal item within reach   side rails raised x 2     Problem: Skin Injury Risk Increased  Goal: Skin Health and Integrity  Outcome: Ongoing, Progressing  Intervention: Optimize Skin Protection  Flowsheets (Taken 4/29/2023 0627)  Skin Protection: adhesive use limited  Head of Bed (HOB) Positioning: HOB elevated     Problem: Infection  Goal: Absence of Infection Signs and Symptoms  Outcome: Ongoing,  Progressing  Intervention: Prevent or Manage Infection  Flowsheets (Taken 4/29/2023 8852)  Infection Management: aseptic technique maintained  Isolation Precautions: precautions initiated

## 2023-04-29 NOTE — SUBJECTIVE & OBJECTIVE
Interval History: No events overnight. Doing well on NC    Review of Systems   Constitutional:  Negative for chills and fever.   Respiratory:  Positive for cough and shortness of breath.    Cardiovascular:  Negative for chest pain and leg swelling.   Gastrointestinal:  Negative for abdominal distention and abdominal pain.   Objective:     Vital Signs (Most Recent):  Temp: 97.9 °F (36.6 °C) (04/29/23 0717)  Pulse: 78 (04/29/23 0819)  Resp: 16 (04/29/23 0819)  BP: (!) 140/77 (04/29/23 0855)  SpO2: 96 % (04/29/23 0819)   Vital Signs (24h Range):  Temp:  [97.4 °F (36.3 °C)-98.2 °F (36.8 °C)] 97.9 °F (36.6 °C)  Pulse:  [78-91] 78  Resp:  [16-20] 16  SpO2:  [87 %-97 %] 96 %  BP: (101-140)/(57-77) 140/77     Weight: 59.8 kg (131 lb 13.4 oz)  Body mass index is 20.05 kg/m².    Intake/Output Summary (Last 24 hours) at 4/29/2023 0915  Last data filed at 4/29/2023 0531  Gross per 24 hour   Intake 1140 ml   Output 351 ml   Net 789 ml        Physical Exam  Constitutional:       General: He is not in acute distress.     Appearance: He is ill-appearing. He is not toxic-appearing or diaphoretic.   Cardiovascular:      Rate and Rhythm: Normal rate.      Heart sounds: No murmur heard.    No gallop.   Pulmonary:      Effort: Pulmonary effort is normal. No respiratory distress.      Breath sounds: Rales present. No wheezing.      Comments: O2 via trach collar  Abdominal:      General: Bowel sounds are normal. There is no distension.      Palpations: Abdomen is soft.      Tenderness: There is no abdominal tenderness.       Significant Labs: All pertinent labs within the past 24 hours have been reviewed.    Significant Imaging: I have reviewed all pertinent imaging results/findings within the past 24 hours.

## 2023-04-29 NOTE — NURSING
[facility]  Nurses Note -- 4 Eyes      4/28/2023       Skin assessed on: Q Shift      [x] No Pressure Injuries Present    [x]Prevention Measures Documented    [] Yes LDA  for Pressure Injury Previously documented     [] Yes New Pressure Injury Discovered   [] LDA for New Pressure Injury Added      Attending RN:  Altagracia Montano RN     Second RN:  Armaan FROST RN

## 2023-04-29 NOTE — PROGRESS NOTES
[facility] Medicine  Progress Note    Patient Name: Fareed Richard Jr.  MRN: [id number]  Patient Class: IP- Inpatient   Admission Date: 3/18/2023  Length of Stay: 41 days  Attending Physician: Kelby Sargent MD  Primary Care Provider: Kodi Tubbs MD        Subjective:     Principal Problem:Acute on chronic respiratory failure with hypoxia and hypercapnia        HPI:  Fareed Richard Jr. 73 y.o. 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  Per his wife 4 days ago he began to have pink tinged sputum via his trach.  He was seen by his ENT 2 days ago with a scope exam and a trach exchange without evidence of bleeding.  This morning at 3:00 a.m. he developed bright red blood via trach which resolved without intervention.  It is since not recurred.  He takes a daily aspirin.  He receives all medications via G-tube.  His tube feeding regimen consists of 6 cans of Isosource daily with 120 cc free water boluses.  He denies fever chest pain shortness of breath nausea vomiting abdominal pain leg swelling syncope dizziness dysuria melena hematuria hematemesis.    In the ED, hemoglobin 7.6 INR 1.0 BUN 50 creatinine 0.7  troponin negative BRCA negative O positive type and screen chest x-ray without detrimental change hypotensive to 85/54.      Overview/Hospital Course:  74 yo M w squamous CA of tongue s/p glossectomy and reconstructive flap with trach, CAD, chronic hypoxic respiratory failure (on 5L at home) and with peg presented for evaluation of hemoptysis 2 days after tracheostomy change in clinic. Transferred to ICU 3/19 when markedly hypotensive.  Unclear if due to hemorrhage, cardiogenic or septic shock.  Did require PRBC and TXA nebs but didn't seem like sufficient bleeding to cause shock.  Bleeding stopped and ENT following.  CTA shows either significant mucus plugging or  bibasilar pneumonia - pulmonology favored pneumonia.  Trach aspirate with Stenotrophomonas, Achromobacter, and Candida. Also urine culture growing Enterococcus.  He is on broad spectrum antibiotics including bactrim. Developed NSTEMI. Cardiology consulted. [facility] 3/23 which showed distal LM 30%, mild LAD 90% bifurcation lesion with D1, Cx mid 80%, RCA moderate diffuse disease with long 70% and some left to right collateral. All vessels heavily calcified. Not a candidate for CABG but plan for staged PCI. Continued on heparin drip, asa/plavix. Vasopressors weaned. [facility] 3/27 PCI to LAD and LCx. Post operatively he was hemodynamically unstable and anemic. Stat CTA showed RP hematoma near L kidney without acute bleeding. Pt transfused supportively. He improved over the next few days. Stepped down to floor. Remains on trach collar, higher than home O2 requirements. He has delirium as well and poor sleep. Attempted trazodone. The following morning, he was much more lethargic and was transferred to ICU for worsening acute on chronic hypoxic/hypercapnic respiratory failure. This improved after 1 day on the vent.      Trach aspirate with GNR; remains on cefepime in addition to bactrim for prior Stenotrophomonas. Improving at 98%, wean O2. Appears Steno and Achro being effectively treated with bactrim, now growing pseudomonas and has been on cefepime, improved. Abx stopped. Pt had thoracentesis with improvement in breathing, but developed a small ptx. IR consulted for CT placement, but pt refused. No longer requiring ventilatory support at night and PTX improving. Initial plan was for ltac placement, but no long needing vent so does not qualify    O2 continued to wean on the floor. Improving with PT/OT.      Interval History: No events overnight. Doing well on NC    Review of Systems   Constitutional:  Negative for chills and fever.   Respiratory:  Positive for cough and shortness of breath.    Cardiovascular:  Negative for chest  pain and leg swelling.   Gastrointestinal:  Negative for abdominal distention and abdominal pain.   Objective:     Vital Signs (Most Recent):  Temp: 97.9 °F (36.6 °C) (04/29/23 0717)  Pulse: 78 (04/29/23 0819)  Resp: 16 (04/29/23 0819)  BP: (!) 140/77 (04/29/23 0855)  SpO2: 96 % (04/29/23 0819)   Vital Signs (24h Range):  Temp:  [97.4 °F (36.3 °C)-98.2 °F (36.8 °C)] 97.9 °F (36.6 °C)  Pulse:  [78-91] 78  Resp:  [16-20] 16  SpO2:  [87 %-97 %] 96 %  BP: (101-140)/(57-77) 140/77     Weight: 59.8 kg (131 lb 13.4 oz)  Body mass index is 20.05 kg/m².    Intake/Output Summary (Last 24 hours) at 4/29/2023 0915  Last data filed at 4/29/2023 0531  Gross per 24 hour   Intake 1140 ml   Output 351 ml   Net 789 ml        Physical Exam  Constitutional:       General: He is not in acute distress.     Appearance: He is ill-appearing. He is not toxic-appearing or diaphoretic.   Cardiovascular:      Rate and Rhythm: Normal rate.      Heart sounds: No murmur heard.    No gallop.   Pulmonary:      Effort: Pulmonary effort is normal. No respiratory distress.      Breath sounds: Rales present. No wheezing.      Comments: O2 via trach collar  Abdominal:      General: Bowel sounds are normal. There is no distension.      Palpations: Abdomen is soft.      Tenderness: There is no abdominal tenderness.       Significant Labs: All pertinent labs within the past 24 hours have been reviewed.    Significant Imaging: I have reviewed all pertinent imaging results/findings within the past 24 hours.      Assessment/Plan:      * Acute on chronic respiratory failure with hypoxia and hypercapnia  At home is on 5L O2 via trach and O2 sats typically in high 80s low 90s. Worsening respiration at admission due to aspiration of blood and likely food aspiration. Required ventilator from 3/21 to 3/23, and required it again 4/11. Has received multiple courses of antibiotics for pneumonia as well as thoracentesis c/b trapped lung and reaccumulation of fluid.   -  "furosemide as tolerated   - finishing bactrim course  - wean O2 as tolerated        Postprocedural pneumothorax  Likely trapped lung      Pleural effusion  Pulmonology consulted who recommended thoracentesis. Performed 4/18 by IR. Pt subsequently developed persistent ptx, but respiratory wise no significant deterioration. Pt refused IR chest tube. Pleural effusion returned. Fluid studies show borderline transudate based on protein and ULN of LDH, however no serum LDH was drawn on the day of thoracentesis.  - monitor  - furosemide as tolerated          Retroperitoneal hematoma  RP hematoma discovered after LHC. See "acute blood loss anemia"      NSTEMI (non-ST elevated myocardial infarction)  See "coronary artery disease"      Anxiety  Hydroxyzine, ativan, and trazadone all led to worsening encephalopathy. Anxiety now improved  - may try mirtazapine if this recurs    Hemoptysis  Resolved after reinflation of trach collar. ENT scopes without visualization of bleeding source    Pneumonia  3/22 Respiratory culture: Stenotrophomonas, Achromobacter--> on bactrim x14 days for this  4/11 Respiratory culture: pseudomonas -> completed cefepime course    PEG (percutaneous endoscopic gastrostomy) status  Continue medications via PEG. Does not take oral intake.   -On isosource 1.5 6 days daily with 120cc free water flushes via wife  -Will continue home tube feedings, with nutrition consulted    Tracheostomy present  See respiratory failure     ACP (advance care planning)  Advance Care Planning     Date: 04/10/2023  Palliative consulted, reviewed documentation. Full code. Needs home palliative follow up upon discharge. Time spent reviewing on 4/10/23= 5 minutes          Severe protein-calorie malnutrition  RD following. On tube feeds.      Secondary malignant neoplasm of cervical lymph node  Noted      Acute blood loss anemia  Blood loss this admission from hemoptysis (improved w/ inflation of trach collar) and retroperitoneal " bleed.   - hemoglobin stable  - CBC MWF    COPD exacerbation  No evidence of current exacerbation      Other hyperlipidemia  -Continue home statin    Primary hypertension  Holding metoprolol    Coronary artery disease involving native coronary artery of native heart with unstable angina pectoris  Troponin elevated when pt was upgraded to the ICU. At that time it was thought to be due to demand, however he had persistent chest pain concerning for ACS. Was started on heparin gtt and tolerated. Subsequently went to [facility] which showed diffuse disease. Pt not a candidate for CABG. He subsequently underwent PCI of LAD and LCx.  - Continue asa, plavix, statin    Squamous cell cancer of tongue  Dx 2021, now s/p total glossectomy, bilateral neck dissection, bilateral cervical facial flaps and anterolateral thigh flap reconstruction of glossectomy defect 1/4/22. Completed adjuvant chemoradiation. Had trach changed by ENT 2 days prior to admit and scope at that time showed no signs of bleeding despite hemoptysis present on admission.     Carotid stenosis  Continues on asa, plavix, statin       Peripheral vascular disease  Continues on asa, plavix, statin         VTE Risk Mitigation (From admission, onward)         Ordered     heparin (porcine) injection 5,000 Units  Every 8 hours         04/20/23 1325     IP VTE LOW RISK PATIENT  Once         04/11/23 0909     Place sequential compression device  Until discontinued         03/18/23 1620     Place NIKITA hose  Until discontinued         03/18/23 1620                Discharge Planning   NISA: 4/25/2023     Code Status: Full Code   Is the patient medically ready for discharge?:     Reason for patient still in hospital (select all that apply): Patient trending condition, Laboratory test, Treatment, Consult recommendations and Pending disposition  Discharge Plan A: Home Health   Discharge Delays: (!) Procedure Scheduling (IR, OR, Labs, Echo, Cath, Echo, EEG)              Kelby Sargent,  MD  Department of Hospital Medicine   [facility] - Telemetry     Burow's Advancement Flap Text: The defect edges were debeveled with a #15 scalpel blade.  Given the location of the defect and the proximity to free margins a Burow's advancement flap was deemed most appropriate.  Using a sterile surgical marker, the appropriate advancement flap was drawn incorporating the defect and placing the expected incisions within the relaxed skin tension lines where possible.    The area thus outlined was incised deep to adipose tissue with a #15 scalpel blade.  The skin margins were undermined to an appropriate distance in all directions utilizing iris scissors.

## 2023-04-30 VITALS
BODY MASS INDEX: 19.98 KG/M2 | TEMPERATURE: 98 F | HEART RATE: 95 BPM | RESPIRATION RATE: 20 BRPM | SYSTOLIC BLOOD PRESSURE: 108 MMHG | WEIGHT: 131.81 LBS | DIASTOLIC BLOOD PRESSURE: 68 MMHG | HEIGHT: 68 IN | OXYGEN SATURATION: 92 %

## 2023-04-30 PROCEDURE — 25000003 PHARM REV CODE 250: Performed by: STUDENT IN AN ORGANIZED HEALTH CARE EDUCATION/TRAINING PROGRAM

## 2023-04-30 PROCEDURE — 94761 N-INVAS EAR/PLS OXIMETRY MLT: CPT

## 2023-04-30 PROCEDURE — 99900035 HC TECH TIME PER 15 MIN (STAT)

## 2023-04-30 PROCEDURE — 25000003 PHARM REV CODE 250: Performed by: HOSPITALIST

## 2023-04-30 PROCEDURE — A4216 STERILE WATER/SALINE, 10 ML: HCPCS | Performed by: HOSPITALIST

## 2023-04-30 PROCEDURE — 25000242 PHARM REV CODE 250 ALT 637 W/ HCPCS: Performed by: HOSPITALIST

## 2023-04-30 PROCEDURE — 94640 AIRWAY INHALATION TREATMENT: CPT

## 2023-04-30 PROCEDURE — 63600175 PHARM REV CODE 636 W HCPCS: Performed by: STUDENT IN AN ORGANIZED HEALTH CARE EDUCATION/TRAINING PROGRAM

## 2023-04-30 PROCEDURE — 99900026 HC AIRWAY MAINTENANCE (STAT)

## 2023-04-30 PROCEDURE — 27000221 HC OXYGEN, UP TO 24 HOURS

## 2023-04-30 PROCEDURE — 25000242 PHARM REV CODE 250 ALT 637 W/ HCPCS: Performed by: INTERNAL MEDICINE

## 2023-04-30 RX ORDER — METOPROLOL TARTRATE 25 MG/1
12.5 TABLET, FILM COATED ORAL 2 TIMES DAILY
Qty: 30 TABLET | Refills: 11 | Status: ON HOLD | OUTPATIENT
Start: 2023-04-30 | End: 2024-01-24

## 2023-04-30 RX ORDER — IPRATROPIUM BROMIDE AND ALBUTEROL SULFATE 2.5; .5 MG/3ML; MG/3ML
3 SOLUTION RESPIRATORY (INHALATION) EVERY 4 HOURS PRN
Qty: 75 ML | Refills: 0 | Status: SHIPPED | OUTPATIENT
Start: 2023-04-30 | End: 2024-04-29

## 2023-04-30 RX ORDER — FUROSEMIDE 40 MG/1
20 TABLET ORAL DAILY
Qty: 15 TABLET | Refills: 11 | Status: ON HOLD | OUTPATIENT
Start: 2023-04-30 | End: 2024-01-24

## 2023-04-30 RX ORDER — NAPROXEN SODIUM 220 MG/1
81 TABLET, FILM COATED ORAL DAILY
Qty: 90 TABLET | Refills: 3 | Status: ON HOLD | OUTPATIENT
Start: 2023-04-30 | End: 2024-01-24

## 2023-04-30 RX ORDER — ACETYLCYSTEINE 100 MG/ML
4 SOLUTION ORAL; RESPIRATORY (INHALATION) 3 TIMES DAILY PRN
Qty: 30 EACH | Refills: 12 | Status: SHIPPED | OUTPATIENT
Start: 2023-04-30 | End: 2023-05-10

## 2023-04-30 RX ADMIN — IPRATROPIUM BROMIDE AND ALBUTEROL SULFATE 3 ML: 2.5; .5 SOLUTION RESPIRATORY (INHALATION) at 08:04

## 2023-04-30 RX ADMIN — HEPARIN SODIUM 5000 UNITS: 5000 INJECTION INTRAVENOUS; SUBCUTANEOUS at 05:04

## 2023-04-30 RX ADMIN — CLOPIDOGREL BISULFATE 75 MG: 75 TABLET ORAL at 08:04

## 2023-04-30 RX ADMIN — ACETYLCYSTEINE 4 ML: 100 SOLUTION ORAL; RESPIRATORY (INHALATION) at 08:04

## 2023-04-30 RX ADMIN — Medication 10 ML: at 12:04

## 2023-04-30 RX ADMIN — FAMOTIDINE 20 MG: 10 INJECTION, SOLUTION INTRAVENOUS at 08:04

## 2023-04-30 RX ADMIN — Medication 10 ML: at 06:04

## 2023-04-30 RX ADMIN — FUROSEMIDE 40 MG: 40 TABLET ORAL at 08:04

## 2023-04-30 RX ADMIN — FERROUS SULFATE TAB 325 MG (65 MG ELEMENTAL FE) 1 EACH: 325 (65 FE) TAB at 08:04

## 2023-04-30 RX ADMIN — ASPIRIN 81 MG CHEWABLE TABLET 81 MG: 81 TABLET CHEWABLE at 08:04

## 2023-04-30 RX ADMIN — ATORVASTATIN CALCIUM 80 MG: 40 TABLET, FILM COATED ORAL at 08:04

## 2023-04-30 RX ADMIN — IPRATROPIUM BROMIDE AND ALBUTEROL SULFATE 3 ML: 2.5; .5 SOLUTION RESPIRATORY (INHALATION) at 03:04

## 2023-04-30 NOTE — NURSING
[facility] [place]  Nurses Note -- 4 Eyes      4/29/2023    Skin assessed on: Q Shift      [x] No Pressure Injuries Present    [x]Prevention Measures Documented    [] Yes LDA  for Pressure Injury Previously documented     [] Yes New Pressure Injury Discovered   [] LDA for New Pressure Injury Added      Attending RN:  Altagracia Montano RN     Second RN:  Kirti BEAZA RN

## 2023-04-30 NOTE — PLAN OF CARE
[facility] will select home health agency. [facility] CM notified nurse, April that pt is ready for discharge from CM standpoint. All CM needs met.   04/30/23 0832   Final Note   Assessment Type Final Discharge Note   Anticipated Discharge Disposition Home-Health   What phone number can be called within the next 1-3 days to see how you are doing after discharge? [phone]   Post-Acute Status   Post-Acute Authorization Home Health   Home Health Status Pending post-acute provider review/more information requested   Patient choice form signed by patient/caregiver List from System Post-Acute Care   Discharge Delays None known at this time

## 2023-04-30 NOTE — PLAN OF CARE
Problem: Fall Injury Risk  Goal: Absence of Fall and Fall-Related Injury  Outcome: Ongoing, Progressing  Intervention: Identify and Manage Contributors  Flowsheets (Taken 4/30/2023 0610)  Self-Care Promotion:   independence encouraged   BADL personal objects within reach   BADL personal routines maintained  Medication Review/Management: medications reviewed  Intervention: Promote Injury-Free Environment  Flowsheets (Taken 4/30/2023 0610)  Safety Promotion/Fall Prevention:   assistive device/personal item within reach   side rails raised x 2     Problem: Skin Injury Risk Increased  Goal: Skin Health and Integrity  Outcome: Ongoing, Progressing  Intervention: Optimize Skin Protection  Flowsheets (Taken 4/30/2023 0610)  Pressure Reduction Techniques: frequent weight shift encouraged  Skin Protection: incontinence pads utilized  Head of Bed (HOB) Positioning: HOB elevated  Intervention: Promote and Optimize Oral Intake  Flowsheets (Taken 4/30/2023 0610)  Oral Nutrition Promotion: rest periods promoted     Problem: Infection  Goal: Absence of Infection Signs and Symptoms  Outcome: Ongoing, Progressing  Intervention: Prevent or Manage Infection  Flowsheets (Taken 4/30/2023 0610)  Infection Management: aseptic technique maintained

## 2023-04-30 NOTE — PLAN OF CARE
[facility] Telemetry      HOME HEALTH ORDERS  FACE TO FACE ENCOUNTER    Patient Name: Fareed Richard Jr.  YOB: 1950    PCP: Kodi Tubbs MD   PCP Address: [address] / [place] [id number]  PCP Phone Number: [phone]  PCP Fax: [phone]    Encounter Date: 3/18/23    Admit to Home Health    Diagnoses:  Active Hospital Problems    Diagnosis  POA    *Acute on chronic respiratory failure with hypoxia and hypercapnia [J96.21, J96.22]  Yes    Postprocedural pneumothorax [J95.811]  No    Pleural effusion [J90]  Yes    Retroperitoneal hematoma [K66.1]  No    NSTEMI (non-ST elevated myocardial infarction) [I21.4]  Yes    Anxiety [F41.9]  Yes    Hemoptysis [R04.2]  Yes    PEG (percutaneous endoscopic gastrostomy) status [Z93.1]  Not Applicable    Tracheostomy present [Z93.0]  Not Applicable    Pneumonia [J18.9]  Yes    ACP (advance care planning) [Z71.89]  Not Applicable    Acute blood loss anemia [D62]  Yes    Severe protein-calorie malnutrition [E43]  Yes    COPD exacerbation [J44.1]  Yes    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  Final path :  [id number] (+microscopic SALINAS, single contralateral node), superior soft tissue margin of left neck with tumor.    2/28/22-4/20/22 completed adjuvant chemoradiation with weekly cisplatin (240 mg/m2 cumulative dose)        Carotid stenosis [I65.29]  Yes    Peripheral vascular disease [I73.9]  Yes      Resolved Hospital  Problems    Diagnosis Date Resolved POA    Shock, unspecified [R57.9] 04/13/2023 No    Hypotension [I95.9] 04/10/2023 Yes    UTI (urinary tract infection) [N39.0] 04/10/2023 Yes       Follow Up Appointments:  Future Appointments   Date Time Provider Department Center   5/10/2023  9:00 AM Tim Bhatt MD [facility] CARDIO [facility]   5/23/2023  9:00 AM [facility] CT2 LIMIT 500 LBS [facility] CT SCAN [facility]   5/23/2023 10:00 AM LAB,  HOSPITAL [facility] LAB [facility]   5/25/2023 11:00 AM Zaki Brunson Jr., MD [address]       Allergies:  Review of patient's allergies indicates:   Allergen Reactions    Ativan [lorazepam] Anxiety       Medications: Review discharge medications with patient and family and provide education.    Current Facility-Administered Medications   Medication Dose Route Frequency Provider Last Rate Last Admin    acetaminophen oral solution 499.5074 mg  499.5074 mg Oral Q4H PRN Caprice Nava MD   499.5074 mg at 04/23/23 2012    acetylcysteine 100 mg/ml (10%) solution 4 mL  4 mL Nebulization Q8H Alessandra Meza MD   4 mL at 04/29/23 2317    albuterol-ipratropium 2.5 mg-0.5 mg/3 mL nebulizer solution 3 mL  3 mL Nebulization Q4H PRN Caprice Nava MD   3 mL at 04/09/23 1251    albuterol-ipratropium 2.5 mg-0.5 mg/3 mL nebulizer solution 3 mL  3 mL Nebulization Q4H Caprice Nava MD   3 mL at 04/30/23 0327    aspirin chewable tablet 81 mg  81 mg Oral Daily Caprice Nava MD   81 mg at 04/29/23 0855    atorvastatin tablet 80 mg  80 mg Per G Tube Daily Caprice Nava MD   80 mg at 04/29/23 0855    atropine injection 0.5 mg  0.5 mg Intravenous PRN Caprice Nava MD        bisacodyL suppository 10 mg  10 mg Rectal Daily PRN Caprice Nava MD   10 mg at 03/31/23 0021    chlorhexidine 0.12 % solution 15 mL  15 mL Mouth/Throat BID Caprice Nava MD   15 mL at 04/28/23 1244    clopidogreL tablet 75 mg  75 mg Oral Daily Caprice Nava MD   75 mg at 04/29/23 0855    famotidine  (PF) injection 20 mg  20 mg Intravenous BID Caprice Nava MD   20 mg at 04/29/23 2248    fentaNYL 50 mcg/mL injection 50 mcg  50 mcg Intravenous Q1H PRN Caprice Nava MD   50 mcg at 04/11/23 1224    ferrous sulfate tablet 1 each  1 tablet Per G Tube Daily Caprice Nava MD   1 each at 04/29/23 0855    furosemide tablet 40 mg  40 mg Oral Daily Kelby Sargent MD   40 mg at 04/29/23 0855    guaiFENesin 100 mg/5 ml syrup 400 mg  400 mg Oral Q4H PRN Caprice Nava MD   400 mg at 04/13/23 0823    heparin (porcine) injection 5,000 Units  5,000 Units Subcutaneous Q8H Nikita Castillo III, MD   5,000 Units at 04/30/23 0548    hydrOXYzine HCL tablet 25 mg  25 mg Oral TID PRN David Jimenez MD   25 mg at 04/27/23 2104    insulin aspart U-100 pen 0-5 Units  0-5 Units Subcutaneous QID (AC + HS) PRN Caprice Nava MD   2 Units at 04/11/23 1741    loperamide capsule 2 mg  2 mg Oral QID PRN Caprice Nava MD        LORazepam injection 0.5 mg  0.5 mg Intravenous Q4H PRN Andry Zhao MD   0.5 mg at 04/18/23 2102    melatonin tablet 9 mg  9 mg Per G Tube Nightly Caprice Nava MD   9 mg at 04/29/23 2248    naloxone 0.4 mg/mL injection 0.02 mg  0.02 mg Intravenous PRN Caprice Nava MD        nitroGLYCERIN SL tablet 0.4 mg  0.4 mg Sublingual Q5 Min PRN Caprice Nava MD        ondansetron disintegrating tablet 8 mg  8 mg Oral Q8H PRN Caprice Nava MD        oxyCODONE immediate release tablet 5 mg  5 mg Per G Tube Q6H PRN Caprice Nava MD   5 mg at 04/19/23 0832    phenyleph-min oil-petrolatum 0.25-14-74.9 % ointment 1 applicator  1 applicator Rectal QID PRN Caprice Nava MD        senna-docusate 8.6-50 mg per tablet 2 tablet  2 tablet Per G Tube BID Caprice Nava MD   2 tablet at 04/26/23 2123    sodium chloride 0.9% flush 10 mL  10 mL Intravenous Q6H Caprice Nava MD   10 mL at 04/30/23 0600    And    sodium chloride 0.9% flush 10 mL  10 mL Intravenous PRN Caprice Nava MD          Current Discharge Medication List        START taking these medications    Details   acetylcysteine 100 mg/ml, 10%, (MUCOMYST) 100 mg/mL (10 %) nebulizer solution Take 4 mLs by nebulization 3 (three) times daily as needed (secretions).  Qty: 30 each, Refills: 12      albuterol-ipratropium (DUO-NEB) 2.5 mg-0.5 mg/3 mL nebulizer solution Take 3 mLs by nebulization every 4 (four) hours as needed for Wheezing. Rescue  Qty: 75 mL, Refills: 0      aspirin 81 MG Chew Take 1 tablet (81 mg total) by mouth once daily.  Qty: 90 tablet, Refills: 3      furosemide (LASIX) 40 MG tablet Take 0.5 tablets (20 mg total) by mouth once daily.  Qty: 15 tablet, Refills: 11      metoprolol tartrate (LOPRESSOR) 25 MG tablet Take 0.5 tablets (12.5 mg total) by mouth 2 (two) times daily.  Qty: 30 tablet, Refills: 11    Comments: .           CONTINUE these medications which have NOT CHANGED    Details   atorvastatin (LIPITOR) 20 MG tablet 1 tablet (20 mg total) by Per G Tube route once daily.  Qty: 90 tablet, Refills: 3      clopidogreL (PLAVIX) 75 mg tablet Take 75 mg by mouth once daily.      glycopyrrolate (CUVPOSA) 1 mg/5 mL (0.2 mg/mL) Soln Take 5 mLs (1 mg total) by mouth 3 (three) times daily as needed (TO REDUCE SECRETIONS).  Qty: 473 mL, Refills: 1    Associated Diagnoses: Squamous cell cancer of tongue; Excessive oral secretions      guaiFENesin 200 mg/5 mL Liqd Take 400 mg by mouth every 4 (four) hours as needed (for secretions).  Qty: 473 mL, Refills: 3    Associated Diagnoses: Squamous cell cancer of tongue      hydrOXYzine HCL (ATARAX) 25 MG tablet SMARTSI.5 Tablet(s) Gastro Tube Every 8 Hours PRN      melatonin (MELATIN) 3 mg tablet 2 tablets (6 mg total) by Per G Tube route nightly.  Refills: 0      omeprazole (PRILOSEC) 40 MG capsule Take 40 mg (contents of one capsule) twice daily through PEG tube. Mix contents of capsule with 10 mL applesauce.  Qty: 60 capsule, Refills: 2      sodium chloride 3% 3 % nebulizer  solution Take 4 mLs by nebulization 2 (two) times a day.  Qty: 720 mL, Refills: 3      WIXELA INHUB 250-50 mcg/dose diskus inhaler SMARTSI Puff(s) By Mouth Every 12 Hours      bisacodyL (DULCOLAX) 10 mg Supp Place 1 suppository (10 mg total) rectally daily as needed.  Refills: 0      oxyCODONE (ROXICODONE) 5 mg/5 mL Soln 5 mLs (5 mg total) by Per G Tube route every 4 (four) hours as needed (Pain).  Qty: 150 mL, Refills: 0    Comments: Quantity prescribed more than 7 day supply? No  Associated Diagnoses: Chronic respiratory failure with hypoxia, on home O2 therapy      polyethylene glycol (GLYCOLAX) 17 gram PwPk 17 g by Per G Tube route 2 (two) times daily.  Refills: 0      sodium chloride (OCEAN) 0.65 % nasal spray 1 spray by Nasal route as needed for Congestion.  Refills: 12           STOP taking these medications       amLODIPine (NORVASC) 10 MG tablet Comments:   Reason for Stopping:         metoprolol succinate (TOPROL-XL) 200 MG 24 hr tablet Comments:   Reason for Stopping:         multivit-min/FA/lycopen/lutein (CENTRUM SILVER MEN ORAL) Comments:   Reason for Stopping:                 I have seen and examined this patient within the last 30 days. My clinical findings that support the need for the home health skilled services and home bound status are the following:no   Weakness/numbness causing balance and gait disturbance due to Coronary Heart Disease, Infection, Weakness/Debility, and Anemia making it taxing to leave home.     Diet:   NPO. Tube feedin cans (as tolerated) of Isosource 1.5 daily +  ml flush before and after each bolus     Labs:  N/a    Referrals/ Consults  Physical Therapy to evaluate and treat. Evaluate for home safety and equipment needs; Establish/upgrade home exercise program. Perform / instruct on therapeutic exercises, gait training, transfer training, and Range of Motion.  Occupational Therapy to evaluate and treat. Evaluate home environment for safety and equipment needs.  Perform/Instruct on transfers, ADL training, ROM, and therapeutic exercises.  Aide to provide assistance with personal care, ADLs, and vital signs.  Respiratory Therapy for tracheostomy care    Activities:   activity as tolerated    Nursing:   Agency to admit patient within 24 hours of hospital discharge unless specified on physician order or at patient request    SN to complete comprehensive assessment including routine vital signs. Instruct on disease process and s/s of complications to report to MD. Review/verify medication list sent home with the patient at time of discharge  and instruct patient/caregiver as needed. Frequency may be adjusted depending on start of care date.     Skilled nurse to perform up to 3 visits PRN for symptoms related to diagnosis    Notify MD if SBP > 160 or < 90; DBP > 90 or < 50; HR > 120 or < 50; Temp > 101; O2 < 88%; Other:       Ok to schedule additional visits based on staff availability and patient request on consecutive days within the home health episode.    When multiple disciplines ordered:    Start of Care occurs on Sunday - Wednesday schedule remaining discipline evaluations as ordered on separate consecutive days following the start of care.    Thursday SOC -schedule subsequent evaluations Friday and Monday the following week.     Friday - Saturday SOC - schedule subsequent discipline evaluations on consecutive days starting Monday of the following week.      Miscellaneous   Home Oxygen:  Oxygen at 5 L/min trach collar to be used:  Continuously.    Wound Care Orders  N/a    I certify that this patient is confined to his home and needs intermittent skilled nursing care, physical therapy, and occupational therapy.

## 2023-04-30 NOTE — DISCHARGE SUMMARY
[facility] Medicine  Discharge Summary      Patient Name: Fareed Richard Jr.  MRN: [id number]  CONNER: [id number]  Patient Class: IP- Inpatient  Admission Date: 3/18/2023  Hospital Length of Stay: 42 days  Discharge Date and Time: No discharge date for patient encounter.  Attending Physician: Kelby Sargent MD   Discharging Provider: Kelby Sargent MD  Primary Care Provider: Kodi Tubbs MD    Primary Care Team: Networked reference to record PCT     HPI:   Fareed Richard Jr. 73 y.o. 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  Per his wife 4 days ago he began to have pink tinged sputum via his trach.  He was seen by his ENT 2 days ago with a scope exam and a trach exchange without evidence of bleeding.  This morning at 3:00 a.m. he developed bright red blood via trach which resolved without intervention.  It is since not recurred.  He takes a daily aspirin.  He receives all medications via G-tube.  His tube feeding regimen consists of 6 cans of Isosource daily with 120 cc free water boluses.  He denies fever chest pain shortness of breath nausea vomiting abdominal pain leg swelling syncope dizziness dysuria melena hematuria hematemesis.    In the ED, hemoglobin 7.6 INR 1.0 BUN 50 creatinine 0.7  troponin negative BRCA negative O positive type and screen chest x-ray without detrimental change hypotensive to 85/54.      Procedure(s) (LRB):  Angioplasty-coronary (Left)  Aortogram, Aortic Arch  AORTOGRAM, WITH SERIALOGRAPHY (N/A)  Stent, Coronary, Multi Vessel      Hospital Course:   73y M w/ SCC of the tongue s/p glossectomy and tracheostomy/PEG, CAD, home O2 dependent presented with hemoptysis. He had a prolonged hospital course (43 days in total) with numerous complications. He was seen by ENT, cardiology, and pulmonology/critical care. His major hospital issues  were:    Hemoptysis: presenting issue which recurred during the hospitalization. ENT scope without source. Resolved after inflation of trach cuff, possibly tamponading bleeding source.    Pneumonia: Received multiple courses of antibiotics for pneumonia over the course of admission, including stenotrophomonas and pseudomonas. Had significantly increased O2 requirements during hospitalization, but was weaned to 5L by discharge.    NSTEMI: found to have NSTEMI and received stents to LAD and LCx    Retroperitoneal Hematoma: Found after LHC but on opposite side from procedure. Managed conservatively    Pleural effusion: Chronic, tapped by pulmonology with borderline transudate and otherwise unremarkable studies    Pneumothorax: post-procedural PTX likely due to trapped lung. Pt refused chest tube. Improved on serial imaging    Hypercapnic respiratory failure: hypercapnia required ventilator x 48 hours, resolved with holding sedating medications    By discharge he was back to his home 5L and requesting to go home. RT provided education to pt/wife on trach care, which they were already brionan with.PT/OT recommended home health. Discussed risks of home environment given complexity of medical issues, however pt and wife refused placement and insisted on returning home. He will follow closely with ENT, cardiology, and PCP for further care.         Goals of Care Treatment Preferences:  Code Status: Full Code          What is most important right now is to focus on spending time at home, remaining as independent as possible, symptom/pain control, curative/life-prolongation (regardless of treatment burdens).  Accordingly, we have decided that the best plan to meet the patient's goals includes continuing with treatment.      Consults:   Consults (From admission, onward)          Status Ordering Provider     Inpatient consult to Social Work/Case Management  Once        Provider:  (Not yet assigned)    Completed DIONNE VAZ  III     Inpatient consult to Social Work/Case Management  Once        Provider:  (Not yet assigned)    Completed DIONNE VAZ III     Inpatient consult to Interventional Radiology  Once        Provider:  Milo Cabrera MD    Completed TIMMY ROLLINS     Inpatient consult to Pulmonology  Once        Provider:  Alessandra Meza MD    Completed JEREMY BATISTA     Inpatient consult to Social Work  Once        Provider:  (Not yet assigned)    Completed AKRAY ABDOLABRAD     Inpatient consult to Registered Dietitian/Nutritionist  Once        Provider:  (Not yet assigned)    Completed JESSICA VALDES     Inpatient consult to Social Work  Once        Provider:  (Not yet assigned)    Completed SARAVANAN WEST     Inpatient consult to Social Work  Once        Provider:  (Not yet assigned)    Completed ALEXANDRA DUNCAN     Inpatient consult to Palliative Care  Once        Provider:  Alexandra Duncan NP    Completed RAIZA GAVIRIA     Inpatient consult to PICC team ([facility])  Once        Provider:  (Not yet assigned)    Completed JEREMY BATISTA     Inpatient consult to ENT  Once        Provider:  Gwen French MD    Acknowledged JEREMY BATISTA     Inpatient consult to Cardiology  Once        Provider:  Tacos Benitez MD    Completed MINA WARNER     Inpatient consult to Midline team  Once        Provider:  (Not yet assigned)    Completed MINA WARNER     Inpatient consult to PICC team ([facility])  Once        Provider:  (Not yet assigned)    Completed MINA WARNER     Inpatient consult to Pulmonology  Once        Provider:  Dionne Ling MD    Completed MINA WARNER     Case Management/  Once        Provider:  (Not yet assigned)    Completed JASS RICHTER     Inpatient consult to Registered Dietitian/Nutritionist  Once        Provider:  (Not yet assigned)    Completed JASS RICHTER            No new Assessment & Plan notes have been filed under this hospital service  since the last note was generated.  Service: Hospital Medicine    Final Active Diagnoses:    Diagnosis Date Noted POA    PRINCIPAL PROBLEM:  Acute on chronic respiratory failure with hypoxia and hypercapnia [J96.21, J96.22] 03/14/2022 Yes    Postprocedural pneumothorax [J95.811] 04/19/2023 No    Pleural effusion [J90] 04/18/2023 Yes    Retroperitoneal hematoma [K66.1] 03/28/2023 No    NSTEMI (non-ST elevated myocardial infarction) [I21.4] 03/25/2023 Yes    Anxiety [F41.9] 03/23/2023 Yes    Hemoptysis [R04.2] 03/19/2023 Yes    PEG (percutaneous endoscopic gastrostomy) status [Z93.1] 09/23/2022 Not Applicable    Tracheostomy present [Z93.0] 09/23/2022 Not Applicable    Pneumonia [J18.9] 09/23/2022 Yes    ACP (advance care planning) [Z71.89] 04/28/2022 Not Applicable    Acute blood loss anemia [D62] 02/16/2022 Yes    Severe protein-calorie malnutrition [E43] 02/16/2022 Yes    COPD exacerbation [J44.1] 02/16/2022 Yes    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] 02/16/2022 Yes    Coronary artery disease involving native coronary artery of native heart with unstable angina pectoris [I25.110] 12/29/2021 Yes    Other hyperlipidemia [E78.49] 12/29/2021 Yes    Primary hypertension [I10] 12/29/2021 Yes    Squamous cell cancer of tongue [C02.9] 12/16/2021 Yes    Carotid stenosis [I65.29] 02/01/2017 Yes    Peripheral vascular disease [I73.9] 03/18/2016 Yes      Problems Resolved During this Admission:    Diagnosis Date Noted Date Resolved POA    Shock, unspecified [R57.9] 04/11/2023 04/13/2023 No    Hypotension [I95.9] 03/19/2023 04/10/2023 Yes    UTI (urinary tract infection) [N39.0] 03/19/2023 04/10/2023 Yes       Discharged Condition: good    Disposition: Home-Health Care Svc    Follow Up:   Follow-up Information       [facility] Follow up.    Specialties: DME Provider, Home Health Services  Why: Office will call patient to schedule a day and time.  Contact information:  [address] [facility]  [address]  [place] [address]  [phone]               Kodi Tubbs MD. Call.    Specialty: Internal Medicine  Why: To schedule a hospital follow up appointment within 7 days.  Contact information:  [address] [facility]  [address]  [place] [address]  [phone]                           Patient Instructions:      Ambulatory referral/consult to HOME Palliative Care   Standing Status: Future   Referral Priority: Routine Referral Type: Consultation   Referral Reason: Other   Requested Specialty: Hospice and Palliative Medicine   Number of Visits Requested: 1       Significant Diagnostic Studies: N/A  As above    Pending Diagnostic Studies:       Procedure Component Value Units Date/Time    EKG 12-lead [[id number]]     Order Status: Sent Lab Status: No result            Medications:  Reconciled Home Medications:      Medication List        START taking these medications      acetylcysteine 100 mg/ml (10%) 100 mg/mL (10 %) nebulizer solution  Commonly known as: MUCOMYST  Take 4 mLs by nebulization 3 (three) times daily as needed (secretions).     albuterol-ipratropium 2.5 mg-0.5 mg/3 mL nebulizer solution  Commonly known as: DUO-NEB  Take 3 mLs by nebulization every 4 (four) hours as needed for Wheezing. Rescue     aspirin 81 MG Chew  Take 1 tablet (81 mg total) by mouth once daily.     furosemide 40 MG tablet  Commonly known as: LASIX  Take 0.5 tablets (20 mg total) by mouth once daily.     metoprolol tartrate 25 MG tablet  Commonly known as: LOPRESSOR  Take 0.5 tablets (12.5 mg total) by mouth 2 (two) times daily.            CONTINUE taking these medications      atorvastatin 20 MG tablet  Commonly known as: LIPITOR  1 tablet (20 mg total) by Per G Tube route once daily.     bisacodyL 10 mg Supp  Commonly known as: DULCOLAX  Place 1 suppository (10 mg total) rectally daily as needed.     clopidogreL 75 mg tablet  Commonly known as: PLAVIX  Take 75 mg by mouth once daily.     CUVPOSA 1 mg/5 mL (0.2 mg/mL) Soln  Generic  drug: glycopyrrolate  Take 5 mLs (1 mg total) by mouth 3 (three) times daily as needed (TO REDUCE SECRETIONS).     guaiFENesin 200 mg/5 mL Liqd  Take 400 mg by mouth every 4 (four) hours as needed (for secretions).     hydrOXYzine HCL 25 MG tablet  Commonly known as: ATARAX  SMARTSI.5 Tablet(s) Gastro Tube Every 8 Hours PRN     melatonin 3 mg tablet  Commonly known as: MELATIN  2 tablets (6 mg total) by Per G Tube route nightly.     omeprazole 40 MG capsule  Commonly known as: PRILOSEC  Take 40 mg (contents of one capsule) twice daily through PEG tube. Mix contents of capsule with 10 mL applesauce.     polyethylene glycol 17 gram Pwpk  Commonly known as: GLYCOLAX  17 g by Per G Tube route 2 (two) times daily.     sodium chloride 3% 3 % nebulizer solution  Take 4 mLs by nebulization 2 (two) times a day.     WIXELA INHUB 250-50 mcg/dose diskus inhaler  Generic drug: fluticasone-salmeterol 250-50 mcg/dose  SMARTSI Puff(s) By Mouth Every 12 Hours            STOP taking these medications      amLODIPine 10 MG tablet  Commonly known as: NORVASC     CENTRUM SILVER MEN ORAL     metoprolol succinate 200 MG 24 hr tablet  Commonly known as: TOPROL-XL            ASK your doctor about these medications      oxyCODONE 5 mg/5 mL Soln  Commonly known as: ROXICODONE  5 mLs (5 mg total) by Per G Tube route every 4 (four) hours as needed (Pain).     sodium chloride 0.65 % nasal spray  Commonly known as: OCEAN  1 spray by Nasal route as needed for Congestion.              Indwelling Lines/Drains at time of discharge:   Lines/Drains/Airways                     Drain  Duration                  Gastrostomy/Enterostomy 22 Percutaneous endoscopic gastrostomy (PEG)  days    Male External Urinary Catheter 23 1900 17 days              Airway  Duration             Adult Surgical Airway 23 1014 Shiley Cuffed 6.0 45 days                    Time spent on the discharge of patient: 75 minutes         Kelby Sargent  MD  Department of Hospital Medicine  [facility] - Telemetry

## 2023-04-30 NOTE — NURSING
[facility]  Nurses Note -- 4 Eyes      4/30/2023       Skin assessed on: Q Shift      [x] No Pressure Injuries Present    []Prevention Measures Documented    [] Yes LDA  for Pressure Injury Previously documented     [] Yes New Pressure Injury Discovered   [] LDA for New Pressure Injury Added      Attending RN:  Kirti Coronado RN     Second RN:  Altagracia Montano RN

## 2023-04-30 NOTE — CARE UPDATE
Pt switched from wall flowmeter trach collar to venti adaptor on trach collar for transport. SpO2 92%.

## 2023-04-30 NOTE — NURSING
Discharge instructions explained to the patient and wife. All questions answered. PICC line and telemetry removed. Catheter tip at 37mm. Respiratory at the bedside trading trach collar tubing to portable tubing. All belongings at the bedside. Wife states she has pt's portable O2 tanks in their vehicle downstairs. Pt to be transported to the vehicle with our O2 tank then switched over to pt's portable tank. Transport requested for wheelchair transport to vehicle.

## 2023-04-30 NOTE — PLAN OF CARE
04/30/23 0826   Medicare Message   Important Message from Medicare regarding Discharge Appeal Rights Explained to patient/caregiver  (CM explained IMM. Pt's spouse, Bridgett verbally expressed understanding. SHANICE emailed a copy to [email or web address].)   Date IMM was signed 04/30/23   Time IMM was signed 0824

## 2023-04-30 NOTE — PLAN OF CARE
Problem: Adult Inpatient Plan of Care  Goal: Plan of Care Review  Outcome: Met  Goal: Patient-Specific Goal (Individualized)  Outcome: Met  Goal: Absence of Hospital-Acquired Illness or Injury  Outcome: Met  Goal: Optimal Comfort and Wellbeing  Outcome: Met  Goal: Readiness for Transition of Care  Outcome: Met     Problem: Fall Injury Risk  Goal: Absence of Fall and Fall-Related Injury  Outcome: Met     Problem: Skin Injury Risk Increased  Goal: Skin Health and Integrity  Outcome: Met     Problem: Infection  Goal: Absence of Infection Signs and Symptoms  Outcome: Met     Problem: Coping Ineffective  Goal: Effective Coping  Outcome: Met     Problem: Communication Impairment (Artificial Airway)  Goal: Effective Communication  Outcome: Met     Problem: Device-Related Complication Risk (Artificial Airway)  Goal: Optimal Device Function  Outcome: Met     Problem: Skin and Tissue Injury (Artificial Airway)  Goal: Absence of Device-Related Skin or Tissue Injury  Outcome: Met     Problem: Impaired Wound Healing  Goal: Optimal Wound Healing  Outcome: Met     Problem: Aspiration (Enteral Nutrition)  Goal: Absence of Aspiration Signs and Symptoms  Outcome: Met     Problem: Device-Related Complication Risk (Enteral Nutrition)  Goal: Safe, Effective Therapy Delivery  Outcome: Met     Problem: Feeding Intolerance (Enteral Nutrition)  Goal: Feeding Tolerance  Outcome: Met     Problem: Mobility Impairment  Goal: Optimal Mobility  Outcome: Met     Problem: Fluid Imbalance (Pneumonia)  Goal: Fluid Balance  Outcome: Met     Problem: Infection (Pneumonia)  Goal: Resolution of Infection Signs and Symptoms  Outcome: Met     Problem: Respiratory Compromise (Pneumonia)  Goal: Effective Oxygenation and Ventilation  Outcome: Met

## 2023-05-02 ENCOUNTER — PATIENT OUTREACH (OUTPATIENT)
Dept: ADMINISTRATIVE | Facility: CLINIC | Age: 73
End: 2023-05-02
Payer: MEDICARE

## 2023-05-02 NOTE — PROGRESS NOTES
C3 nurse spoke with Fareed Richard JrMalia's wife Bridgett for a TCC post hospital discharge follow up call. The patient has a scheduled HOSFU appointment with Kodi Tubbs MD  on 05/04/23 @ 0900. ([facility])

## 2023-05-02 NOTE — PROGRESS NOTES
2nd attempt to make TCC Call. Left voicemail. Please call [phone] leave your first and last name and date of birth for Mike. I will return your call.

## 2023-05-02 NOTE — PROGRESS NOTES
C3 nurse attempted to contact Fareed Richard Jr.  for a TCC post hospital discharge follow up call. Pt's wife Bridgett is unable to conduct the call @ this time and requested a callback. The patient does not have a scheduled hospital follow up and [facility].

## 2023-05-10 ENCOUNTER — TELEPHONE (OUTPATIENT)
Dept: HEMATOLOGY/ONCOLOGY | Facility: CLINIC | Age: 73
End: 2023-05-10
Payer: MEDICARE

## 2023-05-10 ENCOUNTER — OFFICE VISIT (OUTPATIENT)
Dept: CARDIOLOGY | Facility: CLINIC | Age: 73
End: 2023-05-10
Payer: MEDICARE

## 2023-05-10 VITALS
HEIGHT: 68 IN | RESPIRATION RATE: 15 BRPM | BODY MASS INDEX: 21.05 KG/M2 | SYSTOLIC BLOOD PRESSURE: 100 MMHG | WEIGHT: 138.88 LBS | OXYGEN SATURATION: 100 % | DIASTOLIC BLOOD PRESSURE: 60 MMHG | HEART RATE: 98 BPM

## 2023-05-10 DIAGNOSIS — J96.22 ACUTE ON CHRONIC RESPIRATORY FAILURE WITH HYPOXIA AND HYPERCAPNIA: ICD-10-CM

## 2023-05-10 DIAGNOSIS — I25.10 CAD S/P PERCUTANEOUS CORONARY ANGIOPLASTY: ICD-10-CM

## 2023-05-10 DIAGNOSIS — Z93.0 TRACHEOSTOMY DEPENDENCE: ICD-10-CM

## 2023-05-10 DIAGNOSIS — J44.9 CHRONIC OBSTRUCTIVE PULMONARY DISEASE, UNSPECIFIED COPD TYPE: ICD-10-CM

## 2023-05-10 DIAGNOSIS — I77.810 AORTIC ROOT DILATATION: ICD-10-CM

## 2023-05-10 DIAGNOSIS — I21.4 NSTEMI (NON-ST ELEVATED MYOCARDIAL INFARCTION): Primary | ICD-10-CM

## 2023-05-10 DIAGNOSIS — Z98.61 CAD S/P PERCUTANEOUS CORONARY ANGIOPLASTY: ICD-10-CM

## 2023-05-10 DIAGNOSIS — I25.10 CORONARY ARTERY DISEASE INVOLVING NATIVE CORONARY ARTERY OF NATIVE HEART WITHOUT ANGINA PECTORIS: ICD-10-CM

## 2023-05-10 DIAGNOSIS — C02.9 SQUAMOUS CELL CANCER OF TONGUE: ICD-10-CM

## 2023-05-10 DIAGNOSIS — I73.9 PERIPHERAL VASCULAR DISEASE: ICD-10-CM

## 2023-05-10 DIAGNOSIS — J96.21 ACUTE ON CHRONIC RESPIRATORY FAILURE WITH HYPOXIA AND HYPERCAPNIA: ICD-10-CM

## 2023-05-10 DIAGNOSIS — E78.49 OTHER HYPERLIPIDEMIA: ICD-10-CM

## 2023-05-10 DIAGNOSIS — I10 PRIMARY HYPERTENSION: ICD-10-CM

## 2023-05-10 PROCEDURE — 1160F PR REVIEW ALL MEDS BY PRESCRIBER/CLIN PHARMACIST DOCUMENTED: ICD-10-PCS | Mod: CPTII,S$GLB,, | Performed by: INTERNAL MEDICINE

## 2023-05-10 PROCEDURE — 99999 PR PBB SHADOW E&M-EST. PATIENT-LVL V: CPT | Mod: PBBFAC,,, | Performed by: INTERNAL MEDICINE

## 2023-05-10 PROCEDURE — 99215 PR OFFICE/OUTPT VISIT, EST, LEVL V, 40-54 MIN: ICD-10-PCS | Mod: S$GLB,,, | Performed by: INTERNAL MEDICINE

## 2023-05-10 PROCEDURE — 1160F RVW MEDS BY RX/DR IN RCRD: CPT | Mod: CPTII,S$GLB,, | Performed by: INTERNAL MEDICINE

## 2023-05-10 PROCEDURE — 3008F BODY MASS INDEX DOCD: CPT | Mod: CPTII,S$GLB,, | Performed by: INTERNAL MEDICINE

## 2023-05-10 PROCEDURE — 3288F FALL RISK ASSESSMENT DOCD: CPT | Mod: CPTII,S$GLB,, | Performed by: INTERNAL MEDICINE

## 2023-05-10 PROCEDURE — 1101F PR PT FALLS ASSESS DOC 0-1 FALLS W/OUT INJ PAST YR: ICD-10-PCS | Mod: CPTII,S$GLB,, | Performed by: INTERNAL MEDICINE

## 2023-05-10 PROCEDURE — 3078F PR MOST RECENT DIASTOLIC BLOOD PRESSURE < 80 MM HG: ICD-10-PCS | Mod: CPTII,S$GLB,, | Performed by: INTERNAL MEDICINE

## 2023-05-10 PROCEDURE — 1101F PT FALLS ASSESS-DOCD LE1/YR: CPT | Mod: CPTII,S$GLB,, | Performed by: INTERNAL MEDICINE

## 2023-05-10 PROCEDURE — 1111F PR DISCHARGE MEDS RECONCILED W/ CURRENT OUTPATIENT MED LIST: ICD-10-PCS | Mod: CPTII,S$GLB,, | Performed by: INTERNAL MEDICINE

## 2023-05-10 PROCEDURE — 4010F ACE/ARB THERAPY RXD/TAKEN: CPT | Mod: CPTII,S$GLB,, | Performed by: INTERNAL MEDICINE

## 2023-05-10 PROCEDURE — 4010F PR ACE/ARB THEARPY RXD/TAKEN: ICD-10-PCS | Mod: CPTII,S$GLB,, | Performed by: INTERNAL MEDICINE

## 2023-05-10 PROCEDURE — 3008F PR BODY MASS INDEX (BMI) DOCUMENTED: ICD-10-PCS | Mod: CPTII,S$GLB,, | Performed by: INTERNAL MEDICINE

## 2023-05-10 PROCEDURE — 3074F SYST BP LT 130 MM HG: CPT | Mod: CPTII,S$GLB,, | Performed by: INTERNAL MEDICINE

## 2023-05-10 PROCEDURE — 3074F PR MOST RECENT SYSTOLIC BLOOD PRESSURE < 130 MM HG: ICD-10-PCS | Mod: CPTII,S$GLB,, | Performed by: INTERNAL MEDICINE

## 2023-05-10 PROCEDURE — 1126F PR PAIN SEVERITY QUANTIFIED, NO PAIN PRESENT: ICD-10-PCS | Mod: CPTII,S$GLB,, | Performed by: INTERNAL MEDICINE

## 2023-05-10 PROCEDURE — 1111F DSCHRG MED/CURRENT MED MERGE: CPT | Mod: CPTII,S$GLB,, | Performed by: INTERNAL MEDICINE

## 2023-05-10 PROCEDURE — 1126F AMNT PAIN NOTED NONE PRSNT: CPT | Mod: CPTII,S$GLB,, | Performed by: INTERNAL MEDICINE

## 2023-05-10 PROCEDURE — 99215 OFFICE O/P EST HI 40 MIN: CPT | Mod: S$GLB,,, | Performed by: INTERNAL MEDICINE

## 2023-05-10 PROCEDURE — 3288F PR FALLS RISK ASSESSMENT DOCUMENTED: ICD-10-PCS | Mod: CPTII,S$GLB,, | Performed by: INTERNAL MEDICINE

## 2023-05-10 PROCEDURE — 1159F MED LIST DOCD IN RCRD: CPT | Mod: CPTII,S$GLB,, | Performed by: INTERNAL MEDICINE

## 2023-05-10 PROCEDURE — 99999 PR PBB SHADOW E&M-EST. PATIENT-LVL V: ICD-10-PCS | Mod: PBBFAC,,, | Performed by: INTERNAL MEDICINE

## 2023-05-10 PROCEDURE — 3078F DIAST BP <80 MM HG: CPT | Mod: CPTII,S$GLB,, | Performed by: INTERNAL MEDICINE

## 2023-05-10 PROCEDURE — 1159F PR MEDICATION LIST DOCUMENTED IN MEDICAL RECORD: ICD-10-PCS | Mod: CPTII,S$GLB,, | Performed by: INTERNAL MEDICINE

## 2023-05-10 RX ORDER — ATORVASTATIN CALCIUM 40 MG/1
40 TABLET, FILM COATED ORAL NIGHTLY
Qty: 90 TABLET | Refills: 3 | Status: ON HOLD | OUTPATIENT
Start: 2023-05-10 | End: 2024-01-24

## 2023-05-10 RX ORDER — CLOPIDOGREL BISULFATE 75 MG/1
75 TABLET ORAL DAILY
Qty: 90 TABLET | Refills: 3 | Status: ON HOLD | OUTPATIENT
Start: 2023-05-10 | End: 2024-01-24

## 2023-05-10 NOTE — LETTER
May 10, 2023        Kodi Tubbs MD  [address] [facility]  [address]  [place] [address]             [facility] - Cardiology  [address]  [facility] [place] [address]  Phone: [phone]   Patient: Fareed Richard Jr.   MR Number: [id number]   YOB: 1950   Date of Visit: 5/10/2023       Dear Dr. Tubbs:    Thank you for referring Fareed Richard to me for evaluation. Attached you will find relevant portions of my assessment and plan of care.    If you have questions, please do not hesitate to call me. I look forward to following Fareed Richard along with you.    Sincerely,      Tim Bhatt MD            CC  No Recipients    Enclosure

## 2023-05-10 NOTE — TELEPHONE ENCOUNTER
To sudhakar rosario  Please schedule per Cindy and advise pt tks           Previous Messages       ----- Message -----   From: Cindy Buckley NP   Sent: 5/10/2023   1:01 PM CDT   To: Tu Gay RN, Sudhakar Rosario MA   Subject: FW: Dr. Bhatt Referral                         Please schedule this patient for palliative.

## 2023-05-10 NOTE — TELEPHONE ENCOUNTER
Tc to pt's spouse yo schedule appt w/ Cindy Wife stated that it will be very difficult for him to come into clinic      She requested home palliative care consult  TC to Trupti Beck palliative care nurse for hospital informed of above   She stated she will refer home palliative out for services  Wife advised of above and she acknowledged understanding .

## 2023-05-10 NOTE — PROGRESS NOTES
CARDIOVASCULAR PROGRESS NOTE    REASON FOR CONSULT:   Fareed Richard Jr. is a 73 y.o. male who presents for follow up of CAD/NSTEMI/PCI.    PCP: Suly  HISTORY OF PRESENT ILLNESS:   The patient returns for follow-up, accompanied by family member.  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  I met him in late March for non-STEMI with refractory angina.  He underwent multivessel PCI at that time (LAD and LCx) complicated by contralateral retroperitoneal hemorrhage and pulmonary hemorrhage.  This was managed medically.  He denies intercurrent angina, palpitations, or syncope.  He does have chronic dyspnea.  There has been no PND, orthopnea, melena, hematuria, or claudicant symptoms.  He is tolerating his dual antiplatelet therapy.  He is following with oncology.  I suggested palliative care referral for direction of care/goals of care discussion.  They are agreeable to this.  The patient is not a candidate for cardiac rehabilitation.    CARDIOVASCULAR HISTORY:   CAD 3/27/23 NSTEMI s/p PCI: Prox LAD 3.0x24 Synergy BRADLEY, Mid LCx 2.75x20 Synergy BRADLEY    Severe PAD with severe innominate and prox R SCA stenoses and R ЮЛИЯ ISR (cath 3/2023)    Ao root dil, 4.2 cm (echo 3/2023)      PAST MEDICAL HISTORY:     Past Medical History:   Diagnosis Date    Cancer     skin to Rt forearm and face    COPD (chronic obstructive pulmonary disease)     Hyperlipidemia     Hypertension     Pseudoaneurysm        PAST SURGICAL HISTORY:     Past Surgical History:   Procedure Laterality Date    ABDOMINAL SURGERY      stents placed in liver and large intestines, per patient    ANGIOGRAPHY OF AORTIC ARCH  3/27/2023    Procedure: Aortogram, Aortic Arch;  Surgeon: Tim Bhatt MD;  Location: [facility] CATH LAB;  Service: Cardiology;;    AORTOGRAPHY WITH SERIALOGRAPHY N/A 3/27/2023    Procedure: AORTOGRAM, WITH SERIALOGRAPHY;  Surgeon: Tim Bhatt MD;  Location: [facility] CATH LAB;  Service: Cardiology;  Laterality:  N/A;    CAROTID STENT      COLONOSCOPY N/A 09/27/2022    Procedure: COLONOSCOPY;  Surgeon: Donnie Peterson MD;  Location: [facility] ENDO (2ND FLR);  Service: Endoscopy;  Laterality: N/A;    COLONOSCOPY N/A 09/30/2022    Procedure: COLONOSCOPY;  Surgeon: Joy Cabrera MD;  Location: [facility] ENDO (2ND FLR);  Service: Endoscopy;  Laterality: N/A;    CORONARY STENT PLACEMENT  01/2000    DISSECTION OF NECK Bilateral 01/04/2022    Procedure: DISSECTION, NECK;  Surgeon: Naeem Smalls MD;  Location: [facility] OR 2ND FLR;  Service: ENT;  Laterality: Bilateral;    ESOPHAGOGASTRODUODENOSCOPY N/A 09/27/2022    Procedure: EGD (ESOPHAGOGASTRODUODENOSCOPY);  Surgeon: Donnie Peterson MD;  Location: [facility] ENDO (2ND FLR);  Service: Endoscopy;  Laterality: N/A;    ESOPHAGOGASTRODUODENOSCOPY N/A 09/30/2022    Procedure: EGD (ESOPHAGOGASTRODUODENOSCOPY);  Surgeon: Joy Cabrera MD;  Location: [facility] ENDO (2ND FLR);  Service: Endoscopy;  Laterality: N/A;    ESOPHAGOGASTRODUODENOSCOPY W/ PEG N/A 01/04/2022    Procedure: EGD, WITH PEG TUBE INSERTION;  Surgeon: Anthony Claabrese MD;  [facility];  Service: General;  Laterality: N/A;    EYE SURGERY      Cataract bilateral    femoral stents      bilateral    FLAP PROCEDURE N/A 01/03/2022    Procedure: CREATION, FREE FLAP;  Surgeon: Naeem Smalls MD;  [facility];  Service: ENT;  Laterality: N/A;    FLAP PROCEDURE Right 01/04/2022    Procedure: CREATION, FREE FLAP;  Surgeon: Stacey Conde MD;  [facility];  Service: ENT;  Laterality: Right;  Ischemic start 1203  Ischemic stop 1353    GLOSSECTOMY N/A 01/04/2022    Procedure: GLOSSECTOMY;  Surgeon: Naeem Smalls MD;  [facility];  Service: ENT;  Laterality: N/A;    LEFT HEART CATHETERIZATION Left 3/23/2023    Procedure: Left heart cath;  Surgeon: Tacos Benitez MD;  Location: [facility] CATH LAB;  Service: Cardiology;  Laterality: Left;    PERCUTANEOUS TRANSLUMINAL BALLOON ANGIOPLASTY OF  CORONARY ARTERY Left 3/27/2023    Procedure: Angioplasty-coronary;  Surgeon: Tim Bhatt MD;  Location: [facility] CATH LAB;  Service: Cardiology;  Laterality: Left;  not before 9am, R rad access, 6 Fr EBU 3.5 guide    RADICAL NECK DISSECTION Left 01/03/2022    Procedure: DISSECTION, NECK, RADICAL;  Surgeon: Naeem Smalls MD;  Location: [facility] OR [address] [place] [address];  Service: ENT;  Laterality: Left;    SKIN BIOPSY      SKIN CANCER EXCISION      STENT, CORONARY, MULTI VESSEL  3/27/2023    Procedure: Stent, Coronary, Multi Vessel;  Surgeon: Tim Bhatt MD;  Location: [facility] CATH LAB;  Service: Cardiology;;    TRACHEOTOMY N/A 01/04/2022    Procedure: TRACHEOTOMY;  Surgeon: Naeem Smalls MD;  Location: [facility] OR [address] [place] [address];  Service: ENT;  Laterality: N/A;    VASCULAR SURGERY         ALLERGIES AND MEDICATION:     Review of patient's allergies indicates:   Allergen Reactions    Ativan [lorazepam] Anxiety        Medication List            Accurate as of May 10, 2023  9:08 AM. If you have any questions, ask your nurse or doctor.                CONTINUE taking these medications      albuterol-ipratropium 2.5 mg-0.5 mg/3 mL nebulizer solution  Commonly known as: DUO-NEB  Take 3 mLs by nebulization every 4 (four) hours as needed for Wheezing. Rescue     aspirin 81 MG Chew  Take 1 tablet (81 mg total) by mouth once daily.     atorvastatin 20 MG tablet  Commonly known as: LIPITOR  1 tablet (20 mg total) by Per G Tube route once daily.     bisacodyL 10 mg Supp  Commonly known as: DULCOLAX  Place 1 suppository (10 mg total) rectally daily as needed.     clopidogreL 75 mg tablet  Commonly known as: PLAVIX     CUVPOSA 1 mg/5 mL (0.2 mg/mL) Soln  Generic drug: glycopyrrolate  Take 5 mLs (1 mg total) by mouth 3 (three) times daily as needed (TO REDUCE SECRETIONS).     furosemide 40 MG tablet  Commonly known as: LASIX  Take 0.5 tablets (20 mg total) by mouth once daily.     guaiFENesin 200 mg/5 mL Liqd  Take 400 mg by mouth every  4 (four) hours as needed (for secretions).     hydrOXYzine HCL 25 MG tablet  Commonly known as: ATARAX     melatonin 3 mg tablet  Commonly known as: MELATIN  2 tablets (6 mg total) by Per G Tube route nightly.     metoprolol tartrate 25 MG tablet  Commonly known as: LOPRESSOR  Take 0.5 tablets (12.5 mg total) by mouth 2 (two) times daily.     omeprazole 40 MG capsule  Commonly known as: PRILOSEC  Take 40 mg (contents of one capsule) twice daily through PEG tube. Mix contents of capsule with 10 mL applesauce.     polyethylene glycol 17 gram Pwpk  Commonly known as: GLYCOLAX  17 g by Per G Tube route 2 (two) times daily.     sodium chloride 3% 3 % nebulizer solution  Take 4 mLs by nebulization 2 (two) times a day.     WIXELA INHUB 250-50 mcg/dose diskus inhaler  Generic drug: fluticasone-salmeterol 250-50 mcg/dose            STOP taking these medications      acetylcysteine 100 mg/ml (10%) 100 mg/mL (10 %) nebulizer solution  Commonly known as: MUCOMYST  Stopped by: Tim Bhatt MD     oxyCODONE 5 mg/5 mL Soln  Commonly known as: ROXICODONE  Stopped by: Tim Bhatt MD     sodium chloride 0.65 % nasal spray  Commonly known as: OCEAN  Stopped by: Tim Bhatt MD              SOCIAL HISTORY:     Social History     Socioeconomic History    Marital status:    Tobacco Use    Smoking status: Former     Packs/day: 2.00     Years: 40.00     Pack years: 80.00     Types: Cigarettes     Start date: 1963     Quit date: 2018     Years since quittin.0    Smokeless tobacco: Never    Tobacco comments:     3/3 ppd x 40 yrs. Currently 3-4 cigarettes daily .He is trying  to quit. Is using a Vapor cigarettes  2-3 x's a day.   Substance and Sexual Activity    Alcohol use: Not Currently    Drug use: No    Sexual activity: Not Currently       FAMILY HISTORY:   History reviewed. No pertinent family history.    REVIEW OF SYSTEMS:   Review of Systems   Constitutional:  Negative for chills, diaphoresis and  "fever.   HENT:  Negative for nosebleeds.    Eyes:  Negative for blurred vision, double vision and photophobia.   Respiratory:  Negative for hemoptysis, shortness of breath and wheezing.    Cardiovascular:  Negative for chest pain, palpitations, orthopnea, claudication, leg swelling and PND.   Gastrointestinal:  Negative for abdominal pain, blood in stool, heartburn, melena, nausea and vomiting.   Genitourinary:  Negative for flank pain and hematuria.   Musculoskeletal:  Negative for falls, myalgias and neck pain.   Skin:  Negative for rash.   Neurological:  Negative for dizziness, seizures, loss of consciousness, weakness and headaches.   Endo/Heme/Allergies:  Negative for polydipsia. Does not bruise/bleed easily.   Psychiatric/Behavioral:  Negative for depression and memory loss. The patient is not nervous/anxious.      PHYSICAL EXAM:     Vitals:    05/10/23 0854   BP: 100/60   Pulse: 98   Resp: 15    Body mass index is 21.12 kg/m².  Weight: 63 kg (138 lb 14.2 oz)   Height: 5' 8" (172.7 cm)     Physical Exam  Vitals reviewed.   Constitutional:       General: He is not in acute distress.     Appearance: He is well-developed. He is ill-appearing. He is not toxic-appearing or diaphoretic.   HENT:      Head: Normocephalic and atraumatic.   Eyes:      General: No scleral icterus.     Extraocular Movements: Extraocular movements intact.      Conjunctiva/sclera: Conjunctivae normal.      Pupils: Pupils are equal, round, and reactive to light.   Neck:      Vascular: No JVD.      Trachea: Trachea normal.      Comments: Trach collar in place, unable to palpate/auscultate carotids.  Cardiovascular:      Rate and Rhythm: Normal rate and regular rhythm.      Heart sounds: S1 normal and S2 normal. Heart sounds are distant. No murmur heard.    No friction rub. No gallop.   Pulmonary:      Effort: Pulmonary effort is normal. No respiratory distress.      Breath sounds: No stridor. Rales present. No wheezing or rhonchi.   Chest:    "   Chest wall: No tenderness.   Abdominal:      General: There is no distension.      Palpations: Abdomen is soft.   Musculoskeletal:         General: No swelling or tenderness. Normal range of motion.      Cervical back: Normal range of motion and neck supple. No edema or rigidity.      Right lower leg: No edema.      Left lower leg: No edema.   Feet:      Right foot:      Skin integrity: No ulcer.      Left foot:      Skin integrity: No ulcer.   Skin:     General: Skin is warm and dry.      Coloration: Skin is not jaundiced.   Neurological:      General: No focal deficit present.      Mental Status: He is alert and oriented to person, place, and time.      Cranial Nerves: No cranial nerve deficit.   Psychiatric:         Mood and Affect: Mood normal.         Speech: Speech normal.         Behavior: Behavior normal. Behavior is cooperative.       DATA:   EKG: (personally reviewed tracing)  3/20/23 , LBBB    Laboratory:  CBC:  Recent Labs   Lab 04/24/23  0317 04/26/23  0508 04/28/23  0429   WBC 4.50 4.27 4.02   Hemoglobin 9.0 L 8.4 L 8.6 L   Hematocrit 28.2 L 27.4 L 27.7 L   Platelets 256 245 261       CHEMISTRIES:  Recent Labs   Lab 05/10/22  1031 05/11/22  0412 05/12/22  0343 05/14/22  0724 05/17/22  1007 08/24/22  1000 03/22/23  0233 03/23/23  0332 03/24/23  0357 03/25/23  0429 03/26/23  0357 03/27/23  0355 04/23/23  0618 04/24/23  0317 04/25/23  0321 04/26/23  0508 04/28/23  0429   Glucose 130 H 103 110 96 162 H   < > 181 H 178 H  --   --   --    < > 98 126 H 131 H 80 99   Sodium 140 139 135 L 136 133 L   < > 137 135 L  --   --   --    < > 133 L 133 L 133 L 133 L 135 L   Potassium 4.6 5.0 4.4 4.1 5.0   < > 4.2 3.8  --   --   --    < > 5.3 H 4.1 4.2 4.4 4.4   BUN 22 26 H 24 H 18 15   < > 24 H 33 H  --   --   --    < > 21 25 H 26 H 23 24 H   Creatinine 0.8 0.7 0.7 0.7 0.7   < > 0.8 0.7  --   --   --    < > 1.0 1.1 1.0 0.9 0.8   eGFR if non African American >60.0 >60.0 >60.0 >60 >60.0  --   --   --   --   --    --   --   --   --   --   --   --    eGFR  --   --   --   --   --    < > >60 >60  --   --   --    < > >60 >60 >60 >60 >60   Calcium 9.7 9.7 9.1 9.6 9.5   < > 8.1 L 8.0 L  --   --   --    < > 9.2 8.6 L 8.7 8.9 8.8   Magnesium  --  2.3 2.0  --  2.0   < > 1.9 2.1 2.2 2.4 2.3  --   --   --   --   --   --     < > = values in this interval not displayed.       CARDIAC BIOMARKERS:  Recent Labs   Lab 03/19/23  1840 03/20/23  1642 04/11/23  1204   Troponin I 2.222 H 3.416 H 0.261 H       COAGS:  Recent Labs   Lab 03/20/23  1744 03/27/23  2143 04/02/23  0322   INR 1.0 1.2 1.0       LIPIDS/LFTS:  Recent Labs   Lab 04/25/23  0321 04/26/23  0508 04/28/23  0429   AST 33 33 29   ALT 23 25 22       Cardiovascular Testing:  PCI LAD/LCx 3/27/23 (images prev personally reviewed and interpreted)  LVEDP: 3mmHg  LVEF: 60%  Aortic arch: severe calcific atherosclerosis  Ost innominate artery 80%  Prox R SCA 90%, 53mmHg gradient across stenoses.  Prox-mid ICA patent  Distal aortoiliac bifurcation with patent stents in ЮЛИЯ bilaterally and possible severe ISR in R ЮЛИЯ.  Severe diffuse cor Ca++  Dominance: Right  LM: MLI  LAD: prox 95% at D1, mid   Ramus: MLI  LCx: mid 90%  RCA: not injected, diffuse disease, severe dist RCA stenosis (see Dr. Benitez cath report)  PCI prox LAD:  Preop ASA/Plavix, intraop heparin  Wired LAD/D1  Predil 2.5x12, 2.75x15 NC  Stent 3.0x24 Synergy BRADLEY  Post IVUS with mild underexpansion mid stent  Postdil 3.25x20 NC at 22atm  Excellent angiographic result, T3 flow, 0% residual stenosis  PCI mid LCx  Predil 2.5x12  Stent 2.75x20 Synergy BRADLEY 14 fidelia  Unable to pass IVUS due to severe prox LCx tortuosity  Excellent angiographic result, T3 flow, 0% residual stenosis  Hemostasis:  R Radial band  RFA man comp  Impression:  NSTEMI with ongoing unstable sxs  Severe Ca++ 3V CAD, normal LVx  Severe PAD with severe innominate and prox R SCA stenoses and R ЮЛИЯ ISR.  Successful MV PCI:  Prox LAD 3.0x24 Synergy BRADLEY  Mid LCx 2.75x20  Synergy BRADLEY  RFA man comp  R rad vasband  Plan:  Cont med rx  ASA 81mg qd indefinitely  Plavix for minimum 1 year (thru 3/2024). If absolutely necessary, DAPT can be stopped in 30 day (4/27/23).  Would consider prolonged rx if pt tolerates.    Statin  Dispo planning as per ICU team  Follow up with Dr. Bhatt     Echo: 3/20/23  The left ventricle is normal in size with concentric hypertrophy and normal systolic function.  The estimated ejection fraction is 60%. Inferobasal hypokinesis  Indeterminate left ventricular diastolic function.  Normal right ventricular size with normal right ventricular systolic function.  Mild left atrial enlargement.  Mild right atrial enlargement.  Normal central venous pressure (3 mmHg).  The estimated PA systolic pressure is 13 mmHg.  The sinuses of Valsalva is mildly dilated. 4.2cm.     Cath: 3/23/23 (images personally reviewed and interpreted)  3/23/23 [facility] - EDP 13, distal LM 30%, mild LAD 90% bifurcation lesion with D1, Cx mid 80%, RCA moderate diffuse disease with long 70% and some left to right collateral. All vessels heavily calcified  Reviewed with Dr Malone - poor candidate for CABG. Will discuss staged PCI of LAD/Cx if able to tolerate DAPT without further pulmonary bleeding    ASSESSMENT:   # CAD/NSTEMI s/p PCI LAD/Cx 3/2023.  Procedure c/b RP bleed.  # HTN, likely BP undermeasured d/t significant PAD.  # HLP on atorva 20mg  # PAD (R innom/SCA stenosis, bilat ЮЛИЯ stents)  # SCC toungue, following with onc, trach dependent  # aortic atherosclerosis (CTA A/P 3/28/23)  # Ao root dil, 4.2cm (echo 3/2023)    PLAN:   Cont med rx  Cont ASA 81mg qd  Cont Plavix 75mg qd for 1 year (thru 3/2023)  Palliative care referral  Inc atorva 40mg qhs  RTC 6 months with surveillance echo and lipids/LFT (Nov 2023)      Tim Bhatt MD, [facility]

## 2023-05-11 ENCOUNTER — TELEPHONE (OUTPATIENT)
Dept: PALLIATIVE MEDICINE | Facility: HOSPITAL | Age: 73
End: 2023-05-11
Payer: MEDICARE

## 2023-05-11 NOTE — TELEPHONE ENCOUNTER
Advance Care Planning   Palliative Medicine   5/11/20223    - pt known to myself from recent hospital admission with palliative medicine involvement through stay   - during recent admission home palliative medicine was ordered for discharge, received call from KAMILAH Mae in palliative medicine/oncology clinic regarding home palliative medicine order and recent palliative medicine clinic order that was received from Cardiology   - It was communicated from pt's wife, Bridgett, that home palliative care was not set up during admission and she felt it was not possible for pt to physically tolerate clinic visit, which was a factor in initial home palliative orders/recommendation   - contacted [facility] for home palliative referral which was thought to have taken place while inpatient; pt was discussed need for extensive GOC/ACP conversations once home as he was not comfortable having these discussions in acute hospital setting; pt could also benefit from palliative symptom management/assistance   - pt is hospice appropriate (please see previous inpatient palliative documentation); at time of hospitalization pt was not ready to discuss   - [facility] to reach out to pt's wife for home palliative coordination   - Call placed to pt's wife to clarify home palliative plan; was attending work meeting will call back to discuss further    Trupti Beck NP   Palliative Medicine   [facility]

## 2023-05-23 ENCOUNTER — HOSPITAL ENCOUNTER (OUTPATIENT)
Dept: RADIOLOGY | Facility: HOSPITAL | Age: 73
Discharge: HOME | End: 2023-05-23
Attending: STUDENT IN AN ORGANIZED HEALTH CARE EDUCATION/TRAINING PROGRAM
Payer: MEDICARE

## 2023-05-23 ENCOUNTER — TELEPHONE (OUTPATIENT)
Dept: HEMATOLOGY/ONCOLOGY | Facility: CLINIC | Age: 73
End: 2023-05-23
Payer: MEDICARE

## 2023-05-23 DIAGNOSIS — C02.9 SQUAMOUS CELL CANCER OF TONGUE: ICD-10-CM

## 2023-05-23 DIAGNOSIS — J96.21 ACUTE ON CHRONIC RESPIRATORY FAILURE WITH HYPOXIA AND HYPERCAPNIA: ICD-10-CM

## 2023-05-23 DIAGNOSIS — Z92.3 HISTORY OF HEAD AND NECK RADIATION: ICD-10-CM

## 2023-05-23 DIAGNOSIS — Z90.49 S/P GLOSSECTOMY: ICD-10-CM

## 2023-05-23 DIAGNOSIS — J96.22 ACUTE ON CHRONIC RESPIRATORY FAILURE WITH HYPOXIA AND HYPERCAPNIA: ICD-10-CM

## 2023-05-23 PROCEDURE — 71250 CT THORAX DX C-: CPT | Mod: TC

## 2023-05-23 PROCEDURE — 70491 CT SOFT TISSUE NECK W/DYE: CPT | Mod: 26,,, | Performed by: RADIOLOGY

## 2023-05-23 PROCEDURE — 70491 CT SOFT TISSUE NECK WITH CONTRAST: ICD-10-PCS | Mod: 26,,, | Performed by: RADIOLOGY

## 2023-05-23 PROCEDURE — 25500020 PHARM REV CODE 255: Performed by: STUDENT IN AN ORGANIZED HEALTH CARE EDUCATION/TRAINING PROGRAM

## 2023-05-23 PROCEDURE — 71250 CT THORAX DX C-: CPT | Mod: 26,,, | Performed by: RADIOLOGY

## 2023-05-23 PROCEDURE — 70491 CT SOFT TISSUE NECK W/DYE: CPT | Mod: TC

## 2023-05-23 PROCEDURE — 71250 CT CHEST WITHOUT CONTRAST: ICD-10-PCS | Mod: 26,,, | Performed by: RADIOLOGY

## 2023-05-23 RX ADMIN — IOHEXOL 75 ML: 350 INJECTION, SOLUTION INTRAVENOUS at 09:05

## 2023-05-23 NOTE — TELEPHONE ENCOUNTER
Kerri espinoza ma  Per Dr Gonsalves schedule this pt in the beginning of July  He was last seen her by Dr Jay on 11/22 and he will need his follow up  no labs  Try reaching him tomorrow as he is at main having numerous test today Tks

## 2023-05-25 ENCOUNTER — OFFICE VISIT (OUTPATIENT)
Dept: RADIATION ONCOLOGY | Facility: CLINIC | Age: 73
End: 2023-05-25
Payer: MEDICARE

## 2023-05-25 VITALS
HEART RATE: 89 BPM | DIASTOLIC BLOOD PRESSURE: 66 MMHG | BODY MASS INDEX: 20.31 KG/M2 | WEIGHT: 133.63 LBS | RESPIRATION RATE: 19 BRPM | SYSTOLIC BLOOD PRESSURE: 135 MMHG

## 2023-05-25 DIAGNOSIS — Z09 RADIOTHERAPY FOLLOW-UP: ICD-10-CM

## 2023-05-25 DIAGNOSIS — C02.9 SQUAMOUS CELL CANCER OF TONGUE: Primary | ICD-10-CM

## 2023-05-25 PROCEDURE — 99999 PR PBB SHADOW E&M-EST. PATIENT-LVL III: ICD-10-PCS | Mod: PBBFAC,,, | Performed by: STUDENT IN AN ORGANIZED HEALTH CARE EDUCATION/TRAINING PROGRAM

## 2023-05-25 PROCEDURE — 4010F ACE/ARB THERAPY RXD/TAKEN: CPT | Mod: CPTII,S$GLB,, | Performed by: STUDENT IN AN ORGANIZED HEALTH CARE EDUCATION/TRAINING PROGRAM

## 2023-05-25 PROCEDURE — 1159F MED LIST DOCD IN RCRD: CPT | Mod: CPTII,S$GLB,, | Performed by: STUDENT IN AN ORGANIZED HEALTH CARE EDUCATION/TRAINING PROGRAM

## 2023-05-25 PROCEDURE — 4010F PR ACE/ARB THEARPY RXD/TAKEN: ICD-10-PCS | Mod: CPTII,S$GLB,, | Performed by: STUDENT IN AN ORGANIZED HEALTH CARE EDUCATION/TRAINING PROGRAM

## 2023-05-25 PROCEDURE — 1111F DSCHRG MED/CURRENT MED MERGE: CPT | Mod: CPTII,S$GLB,, | Performed by: STUDENT IN AN ORGANIZED HEALTH CARE EDUCATION/TRAINING PROGRAM

## 2023-05-25 PROCEDURE — 1126F AMNT PAIN NOTED NONE PRSNT: CPT | Mod: CPTII,S$GLB,, | Performed by: STUDENT IN AN ORGANIZED HEALTH CARE EDUCATION/TRAINING PROGRAM

## 2023-05-25 PROCEDURE — 3008F BODY MASS INDEX DOCD: CPT | Mod: CPTII,S$GLB,, | Performed by: STUDENT IN AN ORGANIZED HEALTH CARE EDUCATION/TRAINING PROGRAM

## 2023-05-25 PROCEDURE — 3075F SYST BP GE 130 - 139MM HG: CPT | Mod: CPTII,S$GLB,, | Performed by: STUDENT IN AN ORGANIZED HEALTH CARE EDUCATION/TRAINING PROGRAM

## 2023-05-25 PROCEDURE — 99213 OFFICE O/P EST LOW 20 MIN: CPT | Mod: S$GLB,,, | Performed by: STUDENT IN AN ORGANIZED HEALTH CARE EDUCATION/TRAINING PROGRAM

## 2023-05-25 PROCEDURE — 3078F DIAST BP <80 MM HG: CPT | Mod: CPTII,S$GLB,, | Performed by: STUDENT IN AN ORGANIZED HEALTH CARE EDUCATION/TRAINING PROGRAM

## 2023-05-25 PROCEDURE — 99213 PR OFFICE/OUTPT VISIT, EST, LEVL III, 20-29 MIN: ICD-10-PCS | Mod: S$GLB,,, | Performed by: STUDENT IN AN ORGANIZED HEALTH CARE EDUCATION/TRAINING PROGRAM

## 2023-05-25 PROCEDURE — 1159F PR MEDICATION LIST DOCUMENTED IN MEDICAL RECORD: ICD-10-PCS | Mod: CPTII,S$GLB,, | Performed by: STUDENT IN AN ORGANIZED HEALTH CARE EDUCATION/TRAINING PROGRAM

## 2023-05-25 PROCEDURE — 3078F PR MOST RECENT DIASTOLIC BLOOD PRESSURE < 80 MM HG: ICD-10-PCS | Mod: CPTII,S$GLB,, | Performed by: STUDENT IN AN ORGANIZED HEALTH CARE EDUCATION/TRAINING PROGRAM

## 2023-05-25 PROCEDURE — 1111F PR DISCHARGE MEDS RECONCILED W/ CURRENT OUTPATIENT MED LIST: ICD-10-PCS | Mod: CPTII,S$GLB,, | Performed by: STUDENT IN AN ORGANIZED HEALTH CARE EDUCATION/TRAINING PROGRAM

## 2023-05-25 PROCEDURE — 3288F FALL RISK ASSESSMENT DOCD: CPT | Mod: CPTII,S$GLB,, | Performed by: STUDENT IN AN ORGANIZED HEALTH CARE EDUCATION/TRAINING PROGRAM

## 2023-05-25 PROCEDURE — 3075F PR MOST RECENT SYSTOLIC BLOOD PRESS GE 130-139MM HG: ICD-10-PCS | Mod: CPTII,S$GLB,, | Performed by: STUDENT IN AN ORGANIZED HEALTH CARE EDUCATION/TRAINING PROGRAM

## 2023-05-25 PROCEDURE — 3288F PR FALLS RISK ASSESSMENT DOCUMENTED: ICD-10-PCS | Mod: CPTII,S$GLB,, | Performed by: STUDENT IN AN ORGANIZED HEALTH CARE EDUCATION/TRAINING PROGRAM

## 2023-05-25 PROCEDURE — 99999 PR PBB SHADOW E&M-EST. PATIENT-LVL III: CPT | Mod: PBBFAC,,, | Performed by: STUDENT IN AN ORGANIZED HEALTH CARE EDUCATION/TRAINING PROGRAM

## 2023-05-25 PROCEDURE — 1126F PR PAIN SEVERITY QUANTIFIED, NO PAIN PRESENT: ICD-10-PCS | Mod: CPTII,S$GLB,, | Performed by: STUDENT IN AN ORGANIZED HEALTH CARE EDUCATION/TRAINING PROGRAM

## 2023-05-25 PROCEDURE — 1101F PT FALLS ASSESS-DOCD LE1/YR: CPT | Mod: CPTII,S$GLB,, | Performed by: STUDENT IN AN ORGANIZED HEALTH CARE EDUCATION/TRAINING PROGRAM

## 2023-05-25 PROCEDURE — 3008F PR BODY MASS INDEX (BMI) DOCUMENTED: ICD-10-PCS | Mod: CPTII,S$GLB,, | Performed by: STUDENT IN AN ORGANIZED HEALTH CARE EDUCATION/TRAINING PROGRAM

## 2023-05-25 PROCEDURE — 1101F PR PT FALLS ASSESS DOC 0-1 FALLS W/OUT INJ PAST YR: ICD-10-PCS | Mod: CPTII,S$GLB,, | Performed by: STUDENT IN AN ORGANIZED HEALTH CARE EDUCATION/TRAINING PROGRAM

## 2023-05-25 NOTE — PROGRESS NOTES
[facility] Department of Radiation Oncology  Follow Up Visit Note    Diagnosis:  Fareed Richard Jr. is a 73 y.o. 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 He is s/p adjuvant chemoradiation to 66 Gy in 33 fractions, completed 4/22/22.    Oncologic History:  He has a history of tobacco use ~100 pack years.   12/13/21: met with head and neck surgery, with 3.5cm ulcerated left anteriolateral tongue mass. No buccal or FOM lesions. 3cm, firm, minimally mobile left submandibular adenopathy. No facial weakness.  FNA: of left neck mass with metastatic squamous cell carcinoma  1/4/22: DL, Trach, total glossectomy, bilateral neck dissection of 1-4 and resection of left submandibular cervical skin  Op note: extensive submucosal involvement of the vast majority of the tongue. DL with no other lesions.  Lingual nerve was identified.  Was taken to the skull base and found to be negative for carcinoma  Path: 6 cm primary, with 29 mm DOI, with invasion of the left neck soft tissue, submandibular gland and cervical skin. - SM , extensive PNI. 3/68 LN+ (ITC in left lvl II-IV, 1/5LN+ in left IB, with SALINAS+  1/4/22: anterolateral thigh free flap and advancement flap. Post op stay was complicated by positive respiratory cultures requiring ABx. He was transferred to rehab.   2/28/22-4/22/22: 66 Gy in 33 fractions to the region of SALINAS, 60 Gy in 30 fractions to the operative bed, level I, and left II-IV and 54 Gy to left lvl V and right II-IV.       Interval History  The patient presents today for a regularly scheduled follow up visit.      Since last visit, he was hospitalized for 42 days with hemoptysis, PNA, NSTEMI, retroperitoneal hematoma, PTX.    He underwent CT neck and chest without evidence of recurrent disease.      He is continuing to recover from his prolonged hospital stay but  otherwise doing well. Tolerating 5-6 cartons per day via PEG. Remains NPO. Using 4-5L supplemental oxygen. He denies any H&N issues, notably no ear pain, throat pain, neck masses. He does have some skin cancers that he is going to see derm again for resection.     Review of Systems   ROS  As above    Social History:  Social History     Tobacco Use    Smoking status: Former     Packs/day: 2.00     Years: 40.00     Pack years: 80.00     Types: Cigarettes     Start date: 1963     Quit date: 2018     Years since quittin.1    Smokeless tobacco: Never    Tobacco comments:     3/3 ppd x 40 yrs. Currently 3-4 cigarettes daily .He is trying  to quit. Is using a Vapor cigarettes  2-3 x's a day.   Substance Use Topics    Alcohol use: Not Currently    Drug use: No       Exam:  Vitals:    23 1103   BP: 135/66   Pulse: 89   Resp: 19   Weight: 60.6 kg (133 lb 9.6 oz)       Constitutional: Pleasant 73 y.o. male in no acute distress.  Well nourished.   HEENT: s/p total glossectomy, with intact flap. No OPX or OC lesions on direct exam. + trach  Lymph. No adenopathy but exam is limited by post surgical and RT changes. No cutaneous lesions on the neck but has several skin lesions on face and ears.   Lungs: No audible wheezing.  Normal effort.   Musculoskeletal: No gross MSK deformities.   GI: PEG tube without drainage, surrounding erythema or tenderness. + granulation tissue  Psych: Alert and oriented with appropriate mood and affect.  Neuro:  Grossly normal.    Data Review:  Information obtained via chart review and discussion with Mr. Richard and his wife.      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 He is s/p adjuvant chemoradiation to 66 Gy in 33 fractions, completed 22.  HARVEY on CT neck chest on report. There is fullness anterior to left thyroid  cartilage on personal review, with no overlying skin lesion on exam. This appears present and stable in size with comparison to PET in 8/22, without avidity  Subclinical hypothyroidism, TSH T4  ECOG: (2) Ambulatory and capable of self care, unable to carry out work activity, up and about > 50% or waking hours    Plan:  RTC 3 months with TSH  He is to see H&N surgery in near future.     Zaki Brunson MD  Radiation Oncology

## 2023-06-26 PROBLEM — I21.4 NSTEMI (NON-ST ELEVATED MYOCARDIAL INFARCTION): Status: RESOLVED | Noted: 2023-03-25 | Resolved: 2023-06-26

## 2023-06-29 ENCOUNTER — OFFICE VISIT (OUTPATIENT)
Dept: OTOLARYNGOLOGY | Facility: CLINIC | Age: 73
End: 2023-06-29
Payer: MEDICARE

## 2023-06-29 DIAGNOSIS — C02.9 SQUAMOUS CELL CANCER OF TONGUE: ICD-10-CM

## 2023-06-29 DIAGNOSIS — Z93.0 TRACHEOSTOMY PRESENT: Primary | ICD-10-CM

## 2023-06-29 PROCEDURE — 99999 PR PBB SHADOW E&M-EST. PATIENT-LVL III: CPT | Mod: PBBFAC,,, | Performed by: NURSE PRACTITIONER

## 2023-06-29 PROCEDURE — 1126F AMNT PAIN NOTED NONE PRSNT: CPT | Mod: CPTII,S$GLB,, | Performed by: NURSE PRACTITIONER

## 2023-06-29 PROCEDURE — 99999 PR PBB SHADOW E&M-EST. PATIENT-LVL III: ICD-10-PCS | Mod: PBBFAC,,, | Performed by: NURSE PRACTITIONER

## 2023-06-29 PROCEDURE — 99214 OFFICE O/P EST MOD 30 MIN: CPT | Mod: S$GLB,,, | Performed by: NURSE PRACTITIONER

## 2023-06-29 PROCEDURE — 4010F PR ACE/ARB THEARPY RXD/TAKEN: ICD-10-PCS | Mod: CPTII,S$GLB,, | Performed by: NURSE PRACTITIONER

## 2023-06-29 PROCEDURE — 99214 PR OFFICE/OUTPT VISIT, EST, LEVL IV, 30-39 MIN: ICD-10-PCS | Mod: S$GLB,,, | Performed by: NURSE PRACTITIONER

## 2023-06-29 PROCEDURE — 1126F PR PAIN SEVERITY QUANTIFIED, NO PAIN PRESENT: ICD-10-PCS | Mod: CPTII,S$GLB,, | Performed by: NURSE PRACTITIONER

## 2023-06-29 PROCEDURE — 1159F PR MEDICATION LIST DOCUMENTED IN MEDICAL RECORD: ICD-10-PCS | Mod: CPTII,S$GLB,, | Performed by: NURSE PRACTITIONER

## 2023-06-29 PROCEDURE — 1159F MED LIST DOCD IN RCRD: CPT | Mod: CPTII,S$GLB,, | Performed by: NURSE PRACTITIONER

## 2023-06-29 PROCEDURE — 4010F ACE/ARB THERAPY RXD/TAKEN: CPT | Mod: CPTII,S$GLB,, | Performed by: NURSE PRACTITIONER

## 2023-06-29 NOTE — PROGRESS NOTES
CC: Cancer surveillance    Oncology History   Squamous cell cancer of tongue   12/16/2021 Initial Diagnosis    Squamous cell cancer of tongue     12/16/2021 Tumor Conference       His case was discussed at the Multidisciplinary Head and Neck Team Planning Meeting.    Representatives from Medical Oncology, Radiation Oncology, Head and Neck Surgical Oncology, Psychosocial Oncology, and Speech and Language Pathology discussed the case with the following recommendations:    1) surgery  2) work up lung nodule post op         1/2/2022 Cancer Staged    Staging form: Oral Cavity, AJCC 8th Edition  - Clinical stage from 1/2/2022: Stage NAFISA (cT2, cN2a, cM0)     1/4/2022 Cancer Staged    Staging form: Oral Cavity, AJCC 8th Edition  - Pathologic stage from 1/4/2022: Stage IVB (pT4a, pN3b, cM0)     2/28/2022 - 4/6/2022 Chemotherapy    Treatment Summary   Plan Name: OP HEAD NECK CISPLATIN WEEKLY + RADIOTHERAPY  Treatment Goal: Curative  Status: Inactive  Start Date: 2/28/2022  End Date: 4/6/2022  Provider: Christopher Jay MD  Chemotherapy: CISplatin (PLATINOL) 40 mg/m2 = 69 mg in sodium chloride 0.9% 500 mL chemo infusion, 40 mg/m2 = 69 mg, Intravenous, Clinic/HOD 1 time, 6 of 7 cycles  Administration: 69 mg (2/28/2022), 66 mg (3/7/2022), 69 mg (3/16/2022), 69 mg (3/23/2022), 69 mg (3/30/2022), 70 mg (4/6/2022)      Radiation Therapy    Treatment Summary  Course: C1 HN 2022  Treatment Site Ref. ID Energy Dose/Fx (Gy) #Fx Dose Correction (Gy) Total Dose (Gy) Start Date End Date Elapsed Days   IM H&N:1 PTV_66 6X 2 7 / 7 0 14 2/28/2022 3/10/2022 10   IM H&N2 PTV_66 6X 2 26 / 26 0 51.903 3/11/2022 4/22/2022 42      Secondary malignant neoplasm of cervical lymph node   2/16/2022 Initial Diagnosis    Secondary malignant neoplasm of cervical lymph node     2/28/2022 - 4/6/2022 Chemotherapy    Treatment Summary   Plan Name: OP HEAD NECK CISPLATIN WEEKLY + RADIOTHERAPY  Treatment Goal: Curative  Status: Inactive  Start Date: 2/28/2022  End  Date: 4/6/2022  Provider: Christopher Jay MD  Chemotherapy: CISplatin (PLATINOL) 40 mg/m2 = 69 mg in sodium chloride 0.9% 500 mL chemo infusion, 40 mg/m2 = 69 mg, Intravenous, Clinic/HOD 1 time, 6 of 7 cycles  Administration: 69 mg (2/28/2022), 66 mg (3/7/2022), 69 mg (3/16/2022), 69 mg (3/23/2022), 69 mg (3/30/2022), 70 mg (4/6/2022)           HPI   73 y.o. male presents with the above treatment history.  He was recently hospitalized with an MI. He is back home and improving. He has not had any more bleeding.    Review of Systems   Constitutional: Negative for fatigue and unexpected weight change.   HENT: Per HPI.  Eyes: Negative for visual disturbance.   Respiratory: Negative for shortness of breath, hemoptysis   Cardiovascular: Negative for chest pain and palpitations.   Musculoskeletal: Negative for decreased ROM, back pain.   Skin: Negative for rash, sunburn, itching.   Neurological: Negative for dizziness and seizures.   Hematological: Negative for adenopathy. Does not bruise/bleed easily.   Endocrine: Negative for rapid weight loss/weight gain, heat/cold intolerance.     Past Medical History   Patient Active Problem List   Diagnosis    Peripheral vascular disease    Carotid stenosis    Squamous cell cancer of tongue    Coronary artery disease involving native coronary artery of native heart with unstable angina pectoris    Primary hypertension    Other hyperlipidemia    Impaired mobility and ADLs    Tracheobronchitis    Toxic effect of tobacco cigarette, intentional self-harm    COPD exacerbation    Acute blood loss anemia    Secondary malignant neoplasm of cervical lymph node    Severe protein-calorie malnutrition    Acute on chronic respiratory failure with hypoxia and hypercapnia    Dysphagia    Aspiration pneumonia    ACP (advance care planning)    Pulmonary infiltrate    Bloody sputum    Tracheostomy present    PEG (percutaneous endoscopic gastrostomy) status    Acute tracheitis    Gastrointestinal  hemorrhage with melena    Pneumonia    Hemoptysis    Anxiety    Retroperitoneal hematoma    Pleural effusion    Postprocedural pneumothorax    Aortic root dilatation           Past Surgical History   Past Surgical History:   Procedure Laterality Date    ABDOMINAL SURGERY      stents placed in liver and large intestines, per patient    ANGIOGRAPHY OF AORTIC ARCH  3/27/2023    Procedure: Aortogram, Aortic Arch;  Surgeon: Tim Bhatt MD;  Location: [facility] CATH LAB;  Service: Cardiology;;    AORTOGRAPHY WITH SERIALOGRAPHY N/A 3/27/2023    Procedure: AORTOGRAM, WITH SERIALOGRAPHY;  Surgeon: Tim Bhatt MD;  Location: [facility] CATH LAB;  Service: Cardiology;  Laterality: N/A;    CAROTID STENT      COLONOSCOPY N/A 09/27/2022    Procedure: COLONOSCOPY;  Surgeon: Donnie Peterson MD;  Location: [facility] ENDO (2ND FLR);  Service: Endoscopy;  Laterality: N/A;    COLONOSCOPY N/A 09/30/2022    Procedure: COLONOSCOPY;  Surgeon: Joy Cabrera MD;  Location: [facility] (2ND FLR);  Service: Endoscopy;  Laterality: N/A;    CORONARY STENT PLACEMENT  01/2000    DISSECTION OF NECK Bilateral 01/04/2022    Procedure: DISSECTION, NECK;  Surgeon: Naeem Smalls MD;  [facility];  Service: ENT;  Laterality: Bilateral;    ESOPHAGOGASTRODUODENOSCOPY N/A 09/27/2022    Procedure: EGD (ESOPHAGOGASTRODUODENOSCOPY);  Surgeon: Donnie Peterson MD;  Location: [facility] ENDO (2ND FLR);  Service: Endoscopy;  Laterality: N/A;    ESOPHAGOGASTRODUODENOSCOPY N/A 09/30/2022    Procedure: EGD (ESOPHAGOGASTRODUODENOSCOPY);  Surgeon: Joy Cabrera MD;  Location: [facility] ENDO (2ND FLR);  Service: Endoscopy;  Laterality: N/A;    ESOPHAGOGASTRODUODENOSCOPY W/ PEG N/A 01/04/2022    Procedure: EGD, WITH PEG TUBE INSERTION;  Surgeon: Anthony Calabrese MD;  Location: [facility] OR 2ND FLR;  Service: General;  Laterality: N/A;    EYE SURGERY      Cataract bilateral    femoral stents      bilateral    FLAP PROCEDURE N/A 01/03/2022    Procedure: CREATION, FREE  FLAP;  Surgeon: Naeem Smalls MD;  Location: [facility] OR [facility] FLR;  Service: ENT;  Laterality: N/A;    FLAP PROCEDURE Right 2022    Procedure: CREATION, FREE FLAP;  Surgeon: Stacey Conde MD;  Location: [facility] OR 2ND FLR;  Service: ENT;  Laterality: Right;  Ischemic start 1203  Ischemic stop 1353    GLOSSECTOMY N/A 2022    Procedure: GLOSSECTOMY;  Surgeon: Naeem Smalls MD;  Location: [facility] OR [facility] FLR;  Service: ENT;  Laterality: N/A;    LEFT HEART CATHETERIZATION Left 3/23/2023    Procedure: Left heart cath;  Surgeon: Tacos Benitez MD;  Location: [facility] CATH LAB;  Service: Cardiology;  Laterality: Left;    PERCUTANEOUS TRANSLUMINAL BALLOON ANGIOPLASTY OF CORONARY ARTERY Left 3/27/2023    Procedure: Angioplasty-coronary;  Surgeon: Tim Bhatt MD;  Location: [facility] CATH LAB;  Service: Cardiology;  Laterality: Left;  not before 9am, R rad access, 6 Fr EBU 3.5 guide    RADICAL NECK DISSECTION Left 2022    Procedure: DISSECTION, NECK, RADICAL;  Surgeon: Naeem Smalls MD;  [facility];  Service: ENT;  Laterality: Left;    SKIN BIOPSY      SKIN CANCER EXCISION      STENT, CORONARY, MULTI VESSEL  3/27/2023    Procedure: Stent, Coronary, Multi Vessel;  Surgeon: Tim Bhatt MD;  Location: [facility] CATH LAB;  Service: Cardiology;;    TRACHEOTOMY N/A 2022    Procedure: TRACHEOTOMY;  Surgeon: Naeem Smalls MD;  [facility];  Service: ENT;  Laterality: N/A;    VASCULAR SURGERY           Family History   No family history on file.        Social History   .  Social History     Socioeconomic History    Marital status:    Tobacco Use    Smoking status: Former     Packs/day: 2.00     Years: 40.00     Pack years: 80.00     Types: Cigarettes     Start date: 1963     Quit date: 2018     Years since quittin.2    Smokeless tobacco: Never    Tobacco comments:     3/3 ppd x 40 yrs. Currently 3-4 cigarettes daily .He is trying  to quit. Is  using a Vapor cigarettes  2-3 x's a day.   Substance and Sexual Activity    Alcohol use: Not Currently    Drug use: No    Sexual activity: Not Currently         Allergies   Review of patient's allergies indicates:   Allergen Reactions    Ativan [lorazepam] Anxiety           Physical Exam     There were no vitals filed for this visit.          There is no height or weight on file to calculate BMI.      General: AOx3, NAD   Respiratory:  Symmetric chest rise, normal effort  Oral cavity/oropharynx:  No worrisome lesions.  Flap well incorporated.  Neck:  Well-healed neck scars.  Size 6 uncuffed Shiley tracheostomy tube in place- exchanged for identical tube.  No LAD.  Face: House Brackmann I bilaterally.         Assessment/Plan  Problem List Items Addressed This Visit          ENT    Tracheostomy present - Primary     Trach changed without difficulty.             Oncology    Squamous cell cancer of tongue     HARVEY. RTC in 3 months, sooner if needed.

## 2023-07-06 ENCOUNTER — EXTERNAL HOME HEALTH (OUTPATIENT)
Dept: HOME HEALTH SERVICES | Facility: HOSPITAL | Age: 73
End: 2023-07-06
Payer: MEDICARE

## 2023-07-10 ENCOUNTER — OFFICE VISIT (OUTPATIENT)
Dept: HEMATOLOGY/ONCOLOGY | Facility: CLINIC | Age: 73
End: 2023-07-10
Payer: MEDICARE

## 2023-07-10 ENCOUNTER — LAB VISIT (OUTPATIENT)
Dept: LAB | Facility: HOSPITAL | Age: 73
End: 2023-07-10
Attending: STUDENT IN AN ORGANIZED HEALTH CARE EDUCATION/TRAINING PROGRAM
Payer: MEDICARE

## 2023-07-10 VITALS
WEIGHT: 136 LBS | OXYGEN SATURATION: 98 % | HEIGHT: 68 IN | DIASTOLIC BLOOD PRESSURE: 60 MMHG | BODY MASS INDEX: 20.61 KG/M2 | SYSTOLIC BLOOD PRESSURE: 91 MMHG | HEART RATE: 69 BPM

## 2023-07-10 DIAGNOSIS — Z93.0 TRACHEOSTOMY PRESENT: ICD-10-CM

## 2023-07-10 DIAGNOSIS — I10 PRIMARY HYPERTENSION: ICD-10-CM

## 2023-07-10 DIAGNOSIS — E44.0 MODERATE PROTEIN-CALORIE MALNUTRITION: ICD-10-CM

## 2023-07-10 DIAGNOSIS — R53.83 OTHER FATIGUE: ICD-10-CM

## 2023-07-10 DIAGNOSIS — F41.9 ANXIETY: ICD-10-CM

## 2023-07-10 DIAGNOSIS — C02.9 SQUAMOUS CELL CANCER OF TONGUE: ICD-10-CM

## 2023-07-10 DIAGNOSIS — I73.9 PERIPHERAL VASCULAR DISEASE: ICD-10-CM

## 2023-07-10 DIAGNOSIS — Z93.1 PEG (PERCUTANEOUS ENDOSCOPIC GASTROSTOMY) STATUS: ICD-10-CM

## 2023-07-10 DIAGNOSIS — C02.9 SQUAMOUS CELL CANCER OF TONGUE: Primary | ICD-10-CM

## 2023-07-10 DIAGNOSIS — Z09 RADIOTHERAPY FOLLOW-UP: ICD-10-CM

## 2023-07-10 DIAGNOSIS — Z09 FOLLOW-UP EXAM: ICD-10-CM

## 2023-07-10 DIAGNOSIS — Z92.3 H/O HEAD AND NECK RADIATION: ICD-10-CM

## 2023-07-10 LAB
T4 FREE SERPL-MCNC: 0.88 NG/DL (ref 0.71–1.51)
TSH SERPL DL<=0.005 MIU/L-ACNC: 3.61 UIU/ML (ref 0.4–4)

## 2023-07-10 PROCEDURE — 99999 PR PBB SHADOW E&M-EST. PATIENT-LVL III: CPT | Mod: PBBFAC,,, | Performed by: STUDENT IN AN ORGANIZED HEALTH CARE EDUCATION/TRAINING PROGRAM

## 2023-07-10 PROCEDURE — 1101F PT FALLS ASSESS-DOCD LE1/YR: CPT | Mod: CPTII,S$GLB,, | Performed by: STUDENT IN AN ORGANIZED HEALTH CARE EDUCATION/TRAINING PROGRAM

## 2023-07-10 PROCEDURE — 99214 OFFICE O/P EST MOD 30 MIN: CPT | Mod: S$GLB,,, | Performed by: STUDENT IN AN ORGANIZED HEALTH CARE EDUCATION/TRAINING PROGRAM

## 2023-07-10 PROCEDURE — 3288F PR FALLS RISK ASSESSMENT DOCUMENTED: ICD-10-PCS | Mod: CPTII,S$GLB,, | Performed by: STUDENT IN AN ORGANIZED HEALTH CARE EDUCATION/TRAINING PROGRAM

## 2023-07-10 PROCEDURE — 1101F PR PT FALLS ASSESS DOC 0-1 FALLS W/OUT INJ PAST YR: ICD-10-PCS | Mod: CPTII,S$GLB,, | Performed by: STUDENT IN AN ORGANIZED HEALTH CARE EDUCATION/TRAINING PROGRAM

## 2023-07-10 PROCEDURE — 4010F PR ACE/ARB THEARPY RXD/TAKEN: ICD-10-PCS | Mod: CPTII,S$GLB,, | Performed by: STUDENT IN AN ORGANIZED HEALTH CARE EDUCATION/TRAINING PROGRAM

## 2023-07-10 PROCEDURE — 99999 PR PBB SHADOW E&M-EST. PATIENT-LVL III: ICD-10-PCS | Mod: PBBFAC,,, | Performed by: STUDENT IN AN ORGANIZED HEALTH CARE EDUCATION/TRAINING PROGRAM

## 2023-07-10 PROCEDURE — 36415 COLL VENOUS BLD VENIPUNCTURE: CPT | Performed by: STUDENT IN AN ORGANIZED HEALTH CARE EDUCATION/TRAINING PROGRAM

## 2023-07-10 PROCEDURE — 3008F BODY MASS INDEX DOCD: CPT | Mod: CPTII,S$GLB,, | Performed by: STUDENT IN AN ORGANIZED HEALTH CARE EDUCATION/TRAINING PROGRAM

## 2023-07-10 PROCEDURE — 3074F SYST BP LT 130 MM HG: CPT | Mod: CPTII,S$GLB,, | Performed by: STUDENT IN AN ORGANIZED HEALTH CARE EDUCATION/TRAINING PROGRAM

## 2023-07-10 PROCEDURE — 84439 ASSAY OF FREE THYROXINE: CPT | Performed by: STUDENT IN AN ORGANIZED HEALTH CARE EDUCATION/TRAINING PROGRAM

## 2023-07-10 PROCEDURE — 3078F DIAST BP <80 MM HG: CPT | Mod: CPTII,S$GLB,, | Performed by: STUDENT IN AN ORGANIZED HEALTH CARE EDUCATION/TRAINING PROGRAM

## 2023-07-10 PROCEDURE — 3078F PR MOST RECENT DIASTOLIC BLOOD PRESSURE < 80 MM HG: ICD-10-PCS | Mod: CPTII,S$GLB,, | Performed by: STUDENT IN AN ORGANIZED HEALTH CARE EDUCATION/TRAINING PROGRAM

## 2023-07-10 PROCEDURE — 3288F FALL RISK ASSESSMENT DOCD: CPT | Mod: CPTII,S$GLB,, | Performed by: STUDENT IN AN ORGANIZED HEALTH CARE EDUCATION/TRAINING PROGRAM

## 2023-07-10 PROCEDURE — 3074F PR MOST RECENT SYSTOLIC BLOOD PRESSURE < 130 MM HG: ICD-10-PCS | Mod: CPTII,S$GLB,, | Performed by: STUDENT IN AN ORGANIZED HEALTH CARE EDUCATION/TRAINING PROGRAM

## 2023-07-10 PROCEDURE — 99214 PR OFFICE/OUTPT VISIT, EST, LEVL IV, 30-39 MIN: ICD-10-PCS | Mod: S$GLB,,, | Performed by: STUDENT IN AN ORGANIZED HEALTH CARE EDUCATION/TRAINING PROGRAM

## 2023-07-10 PROCEDURE — 1126F AMNT PAIN NOTED NONE PRSNT: CPT | Mod: CPTII,S$GLB,, | Performed by: STUDENT IN AN ORGANIZED HEALTH CARE EDUCATION/TRAINING PROGRAM

## 2023-07-10 PROCEDURE — 84443 ASSAY THYROID STIM HORMONE: CPT | Performed by: STUDENT IN AN ORGANIZED HEALTH CARE EDUCATION/TRAINING PROGRAM

## 2023-07-10 PROCEDURE — 3008F PR BODY MASS INDEX (BMI) DOCUMENTED: ICD-10-PCS | Mod: CPTII,S$GLB,, | Performed by: STUDENT IN AN ORGANIZED HEALTH CARE EDUCATION/TRAINING PROGRAM

## 2023-07-10 PROCEDURE — 4010F ACE/ARB THERAPY RXD/TAKEN: CPT | Mod: CPTII,S$GLB,, | Performed by: STUDENT IN AN ORGANIZED HEALTH CARE EDUCATION/TRAINING PROGRAM

## 2023-07-10 PROCEDURE — 1126F PR PAIN SEVERITY QUANTIFIED, NO PAIN PRESENT: ICD-10-PCS | Mod: CPTII,S$GLB,, | Performed by: STUDENT IN AN ORGANIZED HEALTH CARE EDUCATION/TRAINING PROGRAM

## 2023-07-10 NOTE — PROGRESS NOTES
PATIENT: Fareed Richard Jr.  MRN: [id number]  DATE: 7/10/2023      Diagnosis:   1. Squamous cell cancer of tongue    2. Other fatigue          Chief Complaint: Follow-up and head and neck cancer      Oncologic History:    Oncologic History 1. Squamous cell carcinoma of tongue    Oncologic Treatment 1.  1/4/22 total glossectomy, bilateral neck dissection, bilateral cervical facial advancement flaps and anterolateral thigh free flap reconstruction of his glossectomy defect   2. 2/28/22-4/20/22 adjuvant chemoradiation with cisplatin    Pathology Squamous cell carcinoma, poorly differentiated, p16 negative  Final pathology: pT4a:  Moderately advanced local disease.  Tumor size = 6.0 cm with DOI = 29 mm and invades adjacent structures.   pN3b:  Metastasis in single contralateral node, SALINAS(+).   pM:  Not applicable.   Superior soft tissue margin of the left neck is involved by tumor           Subjective:    Interval History: Mr. Richard is here for follow up    Oncological History copied from medical chart: Initially saw Aletha Cook NP (ENT) 11/29/21 regarding non-healing left sided tongue lesion for about 6 weeks.  Associated symptoms included tongue pain, bleeding, left otalgia, weight loss from difficulty eating, and bump under left chin that enlarged and was refractory to oral antibiotics and nystatin.   History significant for skin cancer of the left cheek and right lower lip about 5-6 years ago. He reported he had a flap done with Dr. Starks. His wife notes that one of the cancers was SCC and the other was basal cell but she cannot call which was which. 12/6/21 CT neck was done (findings below). 12/15/21 he saw Dr. Smalls who performed an FNA of his left neck mass, which finalized as squamous cell carcinoma, p16 negative.   12/16/21 CT chest without contrast was done (findings below).  1/4/22 underwent total glossectomy, bilateral neck dissection, bilateral cervical facial advancement flaps and anterolateral thigh free  flap reconstruction of his glossectomy defect.  Final pathology [id number], +SALINAS (microscopic), +margin (superior soft tissue margin of left neck).    He completed chemoradiation 4/20/22    Interval History:   Since his last visit, pt following up with ENT and HARVEY.  No new issues at time of exam.      Former smoker, 110 pack years.    Past Medical History:   Past Medical History:   Diagnosis Date    Cancer     skin to Rt forearm and face    COPD (chronic obstructive pulmonary disease)     Hyperlipidemia     Hypertension     Pseudoaneurysm        Past Surgical HIstory:   Past Surgical History:   Procedure Laterality Date    ABDOMINAL SURGERY      stents placed in liver and large intestines, per patient    ANGIOGRAPHY OF AORTIC ARCH  3/27/2023    Procedure: Aortogram, Aortic Arch;  Surgeon: Tim Bhatt MD;  Location: [facility] CATH LAB;  Service: Cardiology;;    AORTOGRAPHY WITH SERIALOGRAPHY N/A 3/27/2023    Procedure: AORTOGRAM, WITH SERIALOGRAPHY;  Surgeon: Tim Bhatt MD;  Location: [facility] CATH LAB;  Service: Cardiology;  Laterality: N/A;    CAROTID STENT      COLONOSCOPY N/A 09/27/2022    Procedure: COLONOSCOPY;  Surgeon: Donnie Peterson MD;  Location: [facility] ENDO (2ND FLR);  Service: Endoscopy;  Laterality: N/A;    COLONOSCOPY N/A 09/30/2022    Procedure: COLONOSCOPY;  Surgeon: Joy Cabrera MD;  Location: [facility] ENDO (2ND FLR);  Service: Endoscopy;  Laterality: N/A;    CORONARY STENT PLACEMENT  01/2000    DISSECTION OF NECK Bilateral 01/04/2022    Procedure: DISSECTION, NECK;  Surgeon: Naeem Smalls MD;  Location: [facility] OR 2ND FLR;  Service: ENT;  Laterality: Bilateral;    ESOPHAGOGASTRODUODENOSCOPY N/A 09/27/2022    Procedure: EGD (ESOPHAGOGASTRODUODENOSCOPY);  Surgeon: Donnie Peterson MD;  Location: [facility] ENDO (2ND FLR);  Service: Endoscopy;  Laterality: N/A;    ESOPHAGOGASTRODUODENOSCOPY N/A 09/30/2022    Procedure: EGD (ESOPHAGOGASTRODUODENOSCOPY);  Surgeon: Joy Cabrera MD;  Location: [facility] ENDO ([facility]  FLR);  Service: Endoscopy;  Laterality: N/A;    ESOPHAGOGASTRODUODENOSCOPY W/ PEG N/A 01/04/2022    Procedure: EGD, WITH PEG TUBE INSERTION;  Surgeon: Anthony Calabrese MD;  [facility];  Service: General;  Laterality: N/A;    EYE SURGERY      Cataract bilateral    femoral stents      bilateral    FLAP PROCEDURE N/A 01/03/2022    Procedure: CREATION, FREE FLAP;  Surgeon: Naeem Smalls MD;  [facility];  Service: ENT;  Laterality: N/A;    FLAP PROCEDURE Right 01/04/2022    Procedure: CREATION, FREE FLAP;  Surgeon: Stacey Conde MD;  [facility];  Service: ENT;  Laterality: Right;  Ischemic start 1203  Ischemic stop 1353    GLOSSECTOMY N/A 01/04/2022    Procedure: GLOSSECTOMY;  Surgeon: Naeem Smalls MD;  [facility];  Service: ENT;  Laterality: N/A;    LEFT HEART CATHETERIZATION Left 3/23/2023    Procedure: Left heart cath;  Surgeon: Tacos Benitez MD;  Location: [facility] CATH LAB;  Service: Cardiology;  Laterality: Left;    PERCUTANEOUS TRANSLUMINAL BALLOON ANGIOPLASTY OF CORONARY ARTERY Left 3/27/2023    Procedure: Angioplasty-coronary;  Surgeon: Tim Bhatt MD;  Location: [facility] CATH LAB;  Service: Cardiology;  Laterality: Left;  not before 9am, R rad access, 6 Fr EBU 3.5 guide    RADICAL NECK DISSECTION Left 01/03/2022    Procedure: DISSECTION, NECK, RADICAL;  Surgeon: Naeem Smalls MD;  Location: [address];  Service: ENT;  Laterality: Left;    SKIN BIOPSY      SKIN CANCER EXCISION      STENT, CORONARY, MULTI VESSEL  3/27/2023    Procedure: Stent, Coronary, Multi Vessel;  Surgeon: Tmi Bhatt MD;  Location: [facility] CATH LAB;  Service: Cardiology;;    TRACHEOTOMY N/A 01/04/2022    Procedure: TRACHEOTOMY;  Surgeon: Naeem Smalls MD;  Location: [facility] OR [address] [place] [address];  Service: ENT;  Laterality: N/A;    VASCULAR SURGERY         Family History: No family history on file.    Social History:  reports that he quit smoking about  5 years ago. His smoking use included cigarettes. He started smoking about 60 years ago. He has a 80.00 pack-year smoking history. He has never used smokeless tobacco. He reports that he does not currently use alcohol. He reports that he does not use drugs.    Allergies:  Review of patient's allergies indicates:   Allergen Reactions    Ativan [lorazepam] Anxiety       Medications:  Current Outpatient Medications   Medication Sig Dispense Refill    albuterol-ipratropium (DUO-NEB) 2.5 mg-0.5 mg/3 mL nebulizer solution Take 3 mLs by nebulization every 4 (four) hours as needed for Wheezing. Rescue 75 mL 0    aspirin 81 MG Chew Take 1 tablet (81 mg total) by mouth once daily. 90 tablet 3    atorvastatin (LIPITOR) 40 MG tablet 1 tablet (40 mg total) by Per G Tube route every evening. 90 tablet 3    bisacodyL (DULCOLAX) 10 mg Supp Place 1 suppository (10 mg total) rectally daily as needed.  0    clopidogreL (PLAVIX) 75 mg tablet Take 1 tablet (75 mg total) by mouth once daily. 90 tablet 3    furosemide (LASIX) 40 MG tablet Take 0.5 tablets (20 mg total) by mouth once daily. 15 tablet 11    glycopyrrolate (CUVPOSA) 1 mg/5 mL (0.2 mg/mL) Soln Take 5 mLs (1 mg total) by mouth 3 (three) times daily as needed (TO REDUCE SECRETIONS). 473 mL 1    guaiFENesin 200 mg/5 mL Liqd Take 400 mg by mouth every 4 (four) hours as needed (for secretions). 473 mL 3    hydrOXYzine HCL (ATARAX) 25 MG tablet SMARTSI.5 Tablet(s) Gastro Tube Every 8 Hours PRN      melatonin (MELATIN) 3 mg tablet 2 tablets (6 mg total) by Per G Tube route nightly.  0    metoprolol tartrate (LOPRESSOR) 25 MG tablet Take 0.5 tablets (12.5 mg total) by mouth 2 (two) times daily. 30 tablet 11    omeprazole (PRILOSEC) 40 MG capsule Take 40 mg (contents of one capsule) twice daily through PEG tube. Mix contents of capsule with 10 mL applesauce. 60 capsule 2    polyethylene glycol (GLYCOLAX) 17 gram PwPk 17 g by Per G Tube route 2 (two) times daily.  0    sodium  "chloride 3% 3 % nebulizer solution Take 4 mLs by nebulization 2 (two) times a day. 720 mL 3    WIXELA INHUB 250-50 mcg/dose diskus inhaler SMARTSI Puff(s) By Mouth Every 12 Hours       No current facility-administered medications for this visit.       Review of Systems   Constitutional:  Positive for fatigue. Negative for activity change, appetite change, chills, diaphoresis, fever and unexpected weight change.   HENT:  Positive for trouble swallowing and voice change. Negative for ear pain, mouth sores and nosebleeds.    Eyes:  Negative for visual disturbance.   Respiratory:  Positive for cough and shortness of breath. Negative for chest tightness and wheezing.    Cardiovascular:  Negative for chest pain and leg swelling.   Gastrointestinal:  Negative for abdominal distention, abdominal pain, blood in stool, constipation, diarrhea, nausea and vomiting.   Endocrine: Negative for cold intolerance and heat intolerance.   Genitourinary:  Negative for difficulty urinating and dysuria.   Musculoskeletal:  Negative for arthralgias and back pain.   Skin:  Negative for color change.   Neurological:  Positive for weakness. Negative for dizziness, light-headedness, numbness and headaches.   Hematological:  Negative for adenopathy. Does not bruise/bleed easily.   Psychiatric/Behavioral:  Negative for confusion.      ECOG Performance Status:     ECOG SCORE             Objective:      Vitals:   Vitals:    07/10/23 1104   BP: 91/60   Pulse: 69   SpO2: 98%   Weight: 61.7 kg (136 lb 0.4 oz)   Height: 5' 8" (1.727 m)     BMI: Body mass index is 20.68 kg/m².    Physical Exam  Vitals reviewed.   Constitutional:       General: He is not in acute distress.     Appearance: He is not ill-appearing.   HENT:      Head: Normocephalic and atraumatic.      Mouth/Throat:      Pharynx: No oropharyngeal exudate or posterior oropharyngeal erythema.   Eyes:      General: No scleral icterus.     Extraocular Movements: Extraocular movements " intact.      Conjunctiva/sclera: Conjunctivae normal.      Pupils: Pupils are equal, round, and reactive to light.   Neck:      Comments: +trach  Cardiovascular:      Rate and Rhythm: Normal rate and regular rhythm.      Heart sounds: No murmur heard.    No friction rub. No gallop.   Pulmonary:      Effort: Pulmonary effort is normal. No respiratory distress.      Breath sounds: No stridor. No wheezing, rhonchi or rales.   Abdominal:      General: Bowel sounds are normal. There is no distension.      Palpations: Abdomen is soft. There is no mass.      Tenderness: There is no abdominal tenderness. There is no guarding or rebound.      Comments: +peg tube site c/d/i   Musculoskeletal:         General: Normal range of motion.      Cervical back: Normal range of motion and neck supple.      Right lower leg: No edema.      Left lower leg: No edema.   Skin:     General: Skin is warm and dry.      Findings: No erythema.   Neurological:      General: No focal deficit present.      Mental Status: He is alert.      Motor: Weakness present.       Laboratory Data:   No results found for this or any previous visit (from the past 168 hour(s)).      Imaging:         Assessment/Plan:       ECOG 2      Squamous cell cancer of tongue  -[id number], group stage IVB squamous cell carcinoma of the oral tongue s/p total glossectomy (-SM, + PNI) and bilateral neck dissection (3/68LN+, +SALINAS with involvement of cervical skin). He is s/p adjuvant chemoradiation to 66 Gy in 33 fractions, completed 4/22/22.  -HARVEY on CT neck chest on report. There is fullness anterior to left thyroid cartilage on personal review, with no overlying skin lesion on exam. This appears present and stable in size with comparison to PET in 8/22, without avidity  -12/15/21 FNA left neck mass : squamous cell carcinoma, p16 negative  -1/4/22 total glossectomy, bilateral neck dissection, bilateral cervical facial advancement flaps and anterolateral thigh free flap  reconstruction of his glossectomy defect.  Final path :  [id number] (+microscopic SALINAS, single contralateral node), superior soft tissue margin of left neck with tumor.   -2/28/22-4/20/22 completed adjuvant chemoradiation with weekly cisplatin (240 mg/m2 cumulative dose)      Plan 07/10/23  No evidence of recurrence. Physical exam unchanged.  -labs reviewed  -plan for labs and follow up in 6 months  --needs TSH monitoring at least q6 months  -continue surveillance with ENT and rad/onc      Time spent on case: 30 minutes    Ronnie Gonsalves MD  Hematology Oncology

## 2023-07-11 ENCOUNTER — CLINICAL SUPPORT (OUTPATIENT)
Dept: HEMATOLOGY/ONCOLOGY | Facility: CLINIC | Age: 73
End: 2023-07-11
Payer: MEDICARE

## 2023-07-11 DIAGNOSIS — Z71.3 NUTRITIONAL COUNSELING: ICD-10-CM

## 2023-07-11 DIAGNOSIS — E44.0 MODERATE PROTEIN-CALORIE MALNUTRITION: ICD-10-CM

## 2023-07-11 DIAGNOSIS — C02.9 SQUAMOUS CELL CANCER OF TONGUE: Primary | ICD-10-CM

## 2023-07-11 PROCEDURE — 98967 PH1 ASSMT&MGMT NQHP 11-20: CPT | Mod: 95,,, | Performed by: DIETITIAN, REGISTERED

## 2023-07-11 PROCEDURE — 98967 PR NONPHYSICIAN TELEPHONE ASSESSMENT 11-20 MIN: ICD-10-PCS | Mod: 95,,, | Performed by: DIETITIAN, REGISTERED

## 2023-07-11 NOTE — PROGRESS NOTES
Oncology Nutrition Assessment for Medical Nutrition Therapy  Initial Visit    Fareed Richard Jr.   1950    Referring Provider: Ronnie Gonsalves MD      Reason for Visit: nutrition counseling and education    The patient location is: [place]  The chief complaint leading to consultation is: nutrition referral    Visit type: audio only    Face to Face time with patient: 12 minutes  20 minutes of total time spent on the encounter, which includes face to face time and non-face to face time preparing to see the patient (eg, review of tests), Obtaining and/or reviewing separately obtained history, Documenting clinical information in the electronic or other health record, Independently interpreting results (not separately reported) and communicating results to the patient/family/caregiver, or Care coordination (not separately reported).     Each patient to whom he or she provides medical services by telemedicine is:  (1) informed of the relationship between the physician and patient and the respective role of any other health care provider with respect to management of the patient; and (2) notified that he or she may decline to receive medical services by telemedicine and may withdraw from such care at any time.    PMHx:   Past Medical History:   Diagnosis Date    Cancer     skin to Rt forearm and face    COPD (chronic obstructive pulmonary disease)     Hyperlipidemia     Hypertension     Pseudoaneurysm        Nutrition Assessment    This is a 73 y.o.male with a medical diagnosis of SCC of tongue s/p total glossectomy, bilateral neck dissection, trach, and PEG 1/4/22 followed by adjuvant chemoradiation. Currently under surveillance. For about 1.5 years now, he has continued on TF of Isosource 1.5/Jevity 1.5 bolus 6 cartons/day (provides 2133-2250kcal/day and 91-102g protein/day). His wife reports she does 4 feedings/day (7a, 12p, 5p, 8p). She states that he will not always let her give 6 cartons, is usually only 4-5/day  "because he reports he feels too full. She also gives 300mL water with every feeding per MD. She requested this appointment in the hopes of adding to or changing his formula to help him gain weight.  Wife reports current home infusion company is [employer].     Weight: 61.7 kg (136 lb 0.4 oz) - 7/10 clinic weight  Height: 5' 8" (1.727 m)  BMI: 20.68    Usual BW: 145-150lb  Weight Change: 10-15lb loss over the past 3 months    Allergies: Ativan [lorazepam]    Current Medications:  Current Outpatient Medications:     albuterol-ipratropium (DUO-NEB) 2.5 mg-0.5 mg/3 mL nebulizer solution, Take 3 mLs by nebulization every 4 (four) hours as needed for Wheezing. Rescue, Disp: 75 mL, Rfl: 0    aspirin 81 MG Chew, Take 1 tablet (81 mg total) by mouth once daily., Disp: 90 tablet, Rfl: 3    atorvastatin (LIPITOR) 40 MG tablet, 1 tablet (40 mg total) by Per G Tube route every evening., Disp: 90 tablet, Rfl: 3    bisacodyL (DULCOLAX) 10 mg Supp, Place 1 suppository (10 mg total) rectally daily as needed., Disp: , Rfl: 0    clopidogreL (PLAVIX) 75 mg tablet, Take 1 tablet (75 mg total) by mouth once daily., Disp: 90 tablet, Rfl: 3    furosemide (LASIX) 40 MG tablet, Take 0.5 tablets (20 mg total) by mouth once daily., Disp: 15 tablet, Rfl: 11    glycopyrrolate (CUVPOSA) 1 mg/5 mL (0.2 mg/mL) Soln, Take 5 mLs (1 mg total) by mouth 3 (three) times daily as needed (TO REDUCE SECRETIONS)., Disp: 473 mL, Rfl: 1    guaiFENesin 200 mg/5 mL Liqd, Take 400 mg by mouth every 4 (four) hours as needed (for secretions)., Disp: 473 mL, Rfl: 3    hydrOXYzine HCL (ATARAX) 25 MG tablet, SMARTSI.5 Tablet(s) Gastro Tube Every 8 Hours PRN, Disp: , Rfl:     melatonin (MELATIN) 3 mg tablet, 2 tablets (6 mg total) by Per G Tube route nightly., Disp: , Rfl: 0    metoprolol tartrate (LOPRESSOR) 25 MG tablet, Take 0.5 tablets (12.5 mg total) by mouth 2 (two) times daily., Disp: 30 tablet, Rfl: 11    omeprazole (PRILOSEC) 40 MG capsule, Take 40 mg " (contents of one capsule) twice daily through PEG tube. Mix contents of capsule with 10 mL applesauce., Disp: 60 capsule, Rfl: 2    polyethylene glycol (GLYCOLAX) 17 gram PwPk, 17 g by Per G Tube route 2 (two) times daily., Disp: , Rfl: 0    sodium chloride 3% 3 % nebulizer solution, Take 4 mLs by nebulization 2 (two) times a day., Disp: 720 mL, Rfl: 3    WIXELA INHUB 250-50 mcg/dose diskus inhaler, SMARTSI Puff(s) By Mouth Every 12 Hours, Disp: , Rfl:     Food/medication interactions noted: none    Vitamins/Supplements: none    Labs: Reviewed    Nutrition Diagnosis    Problem: moderate malnutrition  Etiology: inadequate energy and protein intake  Signs/Symptoms: tube feeding providing < nutrition prescription    Nutrition Intervention    Nutrition Prescription   2160 kcals (35kcal/kg)  74-86g protein (1.2-1.4g/kg)   2160mL fluid (35mL/kg)    Recommendations:  Trial changing TF to [employer] Standard 1.4 bolus 5 cartons/day  Continue to flush with 300mL water per MD    Materials Provided/Reviewed:  new TF formula sample case requested with patient's permission    Nutrition Monitoring and Evaluation    Monitor: energy intake, rate/formulation of tube feeding, and weight status    Goals: weight gain    Follow up: in 1-2 weeks    Communication to referring provider/care team: note available in chart    Counseling time: 12 minutes    Kirsten Wright MS, RD, LDN

## 2023-07-17 PROBLEM — J18.9 PNEUMONIA: Status: RESOLVED | Noted: 2022-09-23 | Resolved: 2023-07-17

## 2023-07-17 NOTE — PROGRESS NOTES
Oncology Nutrition Assessment for Medical Nutrition Therapy  Follow-up Visit    Fareed Richard Jr.   1950    Referring Provider: No ref. provider found      Reason for Visit: nutrition counseling and education    The patient location is: [place]  The chief complaint leading to consultation is: nutrition referral    Visit type: audio only    Face to Face time with patient: 12 minutes  20 minutes of total time spent on the encounter, which includes face to face time and non-face to face time preparing to see the patient (eg, review of tests), Obtaining and/or reviewing separately obtained history, Documenting clinical information in the electronic or other health record, Independently interpreting results (not separately reported) and communicating results to the patient/family/caregiver, or Care coordination (not separately reported).     Each patient to whom he or she provides medical services by telemedicine is:  (1) informed of the relationship between the physician and patient and the respective role of any other health care provider with respect to management of the patient; and (2) notified that he or she may decline to receive medical services by telemedicine and may withdraw from such care at any time.    PMHx:   Past Medical History:   Diagnosis Date    Cancer     skin to Rt forearm and face    COPD (chronic obstructive pulmonary disease)     Hyperlipidemia     Hypertension     Pseudoaneurysm        Nutrition Assessment    This is a 73 y.o.male with a medical diagnosis of SCC of tongue s/p total glossectomy, bilateral neck dissection, trach, and PEG 1/4/22 followed by adjuvant chemoradiation. Currently under surveillance. For about 1.5 years now, he has continued on TF of Isosource 1.5/Jevity 1.5 bolus 6 cartons/day (provides 2133-2250kcal/day and 91-102g protein/day). His wife reports she does 4 feedings/day (7a, 12p, 5p, 8p). She states that he will not always let her give 6 cartons, is usually only 5/day  "because he reports he feels too full. She also gives 300mL water with every feeding per MD. At our last appointment, we tried changing TF to [employer] Standard 1.4 bolus 5 cartons/day. Unfortunately this did not go well, he vomited after 3 feedings in a row. He is back on Jevity/Isosource now.     Weight: 61.7 kg (136 lb 0.4 oz) - 7/10 clinic weight  Height: 5' 8" (1.727 m)  BMI: 20.68    Usual BW: 145-150lb  Weight Change: 10-15lb loss over the past 3 months    Allergies: Ativan [lorazepam]    Current Medications:  Current Outpatient Medications:     albuterol-ipratropium (DUO-NEB) 2.5 mg-0.5 mg/3 mL nebulizer solution, Take 3 mLs by nebulization every 4 (four) hours as needed for Wheezing. Rescue, Disp: 75 mL, Rfl: 0    aspirin 81 MG Chew, Take 1 tablet (81 mg total) by mouth once daily., Disp: 90 tablet, Rfl: 3    atorvastatin (LIPITOR) 40 MG tablet, 1 tablet (40 mg total) by Per G Tube route every evening., Disp: 90 tablet, Rfl: 3    bisacodyL (DULCOLAX) 10 mg Supp, Place 1 suppository (10 mg total) rectally daily as needed., Disp: , Rfl: 0    clopidogreL (PLAVIX) 75 mg tablet, Take 1 tablet (75 mg total) by mouth once daily., Disp: 90 tablet, Rfl: 3    furosemide (LASIX) 40 MG tablet, Take 0.5 tablets (20 mg total) by mouth once daily., Disp: 15 tablet, Rfl: 11    glycopyrrolate (CUVPOSA) 1 mg/5 mL (0.2 mg/mL) Soln, Take 5 mLs (1 mg total) by mouth 3 (three) times daily as needed (TO REDUCE SECRETIONS)., Disp: 473 mL, Rfl: 1    guaiFENesin 200 mg/5 mL Liqd, Take 400 mg by mouth every 4 (four) hours as needed (for secretions)., Disp: 473 mL, Rfl: 3    hydrOXYzine HCL (ATARAX) 25 MG tablet, SMARTSI.5 Tablet(s) Gastro Tube Every 8 Hours PRN, Disp: , Rfl:     melatonin (MELATIN) 3 mg tablet, 2 tablets (6 mg total) by Per G Tube route nightly., Disp: , Rfl: 0    metoprolol tartrate (LOPRESSOR) 25 MG tablet, Take 0.5 tablets (12.5 mg total) by mouth 2 (two) times daily., Disp: 30 tablet, Rfl: 11    omeprazole " (PRILOSEC) 40 MG capsule, Take 40 mg (contents of one capsule) twice daily through PEG tube. Mix contents of capsule with 10 mL applesauce., Disp: 60 capsule, Rfl: 2    polyethylene glycol (GLYCOLAX) 17 gram PwPk, 17 g by Per G Tube route 2 (two) times daily., Disp: , Rfl: 0    sodium chloride 3% 3 % nebulizer solution, Take 4 mLs by nebulization 2 (two) times a day., Disp: 720 mL, Rfl: 3    WIXELA INHUB 250-50 mcg/dose diskus inhaler, SMARTSI Puff(s) By Mouth Every 12 Hours, Disp: , Rfl:     Food/medication interactions noted: none    Vitamins/Supplements: none    Labs: Reviewed    Nutrition Diagnosis    Problem: moderate malnutrition  Etiology: inadequate energy and protein intake  Signs/Symptoms: tube feeding providing < nutrition prescription    Nutrition Intervention    Nutrition Prescription   2160 kcals (35kcal/kg)  74-86g protein (1.2-1.4g/kg)   2160mL fluid (35mL/kg)    Recommendations:  Resume Isosource 1.5/Jevity 1.5 - increase to 6 cartons/day   -discussed new schedule: 7a - 2 cartons, 12p- 1 carton, 5p - 2 cartons, 8p - 1 carton)  Continue to flush with 300mL water per MD (wife plans to only do 150mL at feedings with 2 cartons)    Materials Provided/Reviewed: none    Nutrition Monitoring and Evaluation    Monitor: energy intake, rate/formulation of tube feeding, and weight status    Goals: weight gain    Follow up: Patient provided with contact information and advised to call/message with questions or to make future appointment if further intervention is needed.    Communication to referring provider/care team: note available in chart    Counseling time: 12 minutes    Kirsten Wright MS, RD, LDN

## 2023-07-18 ENCOUNTER — CLINICAL SUPPORT (OUTPATIENT)
Dept: HEMATOLOGY/ONCOLOGY | Facility: CLINIC | Age: 73
End: 2023-07-18
Payer: MEDICARE

## 2023-07-18 DIAGNOSIS — E44.0 MODERATE PROTEIN-CALORIE MALNUTRITION: ICD-10-CM

## 2023-07-18 DIAGNOSIS — C02.9 SQUAMOUS CELL CANCER OF TONGUE: Primary | ICD-10-CM

## 2023-07-18 DIAGNOSIS — Z71.3 NUTRITIONAL COUNSELING: ICD-10-CM

## 2023-07-18 PROCEDURE — 98967 PR NONPHYSICIAN TELEPHONE ASSESSMENT 11-20 MIN: ICD-10-PCS | Mod: 95,,, | Performed by: DIETITIAN, REGISTERED

## 2023-07-18 PROCEDURE — 98967 PH1 ASSMT&MGMT NQHP 11-20: CPT | Mod: 95,,, | Performed by: DIETITIAN, REGISTERED

## 2023-07-24 PROBLEM — J96.22 ACUTE ON CHRONIC RESPIRATORY FAILURE WITH HYPOXIA AND HYPERCAPNIA: Status: RESOLVED | Noted: 2022-03-14 | Resolved: 2023-07-24

## 2023-07-24 PROBLEM — J96.21 ACUTE ON CHRONIC RESPIRATORY FAILURE WITH HYPOXIA AND HYPERCAPNIA: Status: RESOLVED | Noted: 2022-03-14 | Resolved: 2023-07-24

## 2023-08-24 ENCOUNTER — OFFICE VISIT (OUTPATIENT)
Dept: RADIATION ONCOLOGY | Facility: CLINIC | Age: 73
End: 2023-08-24
Payer: MEDICARE

## 2023-08-24 VITALS
WEIGHT: 134.5 LBS | DIASTOLIC BLOOD PRESSURE: 60 MMHG | BODY MASS INDEX: 20.45 KG/M2 | OXYGEN SATURATION: 93 % | HEART RATE: 92 BPM | RESPIRATION RATE: 18 BRPM | SYSTOLIC BLOOD PRESSURE: 124 MMHG

## 2023-08-24 DIAGNOSIS — R53.1 WEAKNESS: ICD-10-CM

## 2023-08-24 DIAGNOSIS — C02.9 SQUAMOUS CELL CANCER OF TONGUE: Primary | ICD-10-CM

## 2023-08-24 DIAGNOSIS — Z09 RADIOTHERAPY FOLLOW-UP: ICD-10-CM

## 2023-08-24 PROCEDURE — 4010F ACE/ARB THERAPY RXD/TAKEN: CPT | Mod: CPTII,S$GLB,, | Performed by: STUDENT IN AN ORGANIZED HEALTH CARE EDUCATION/TRAINING PROGRAM

## 2023-08-24 PROCEDURE — 3008F PR BODY MASS INDEX (BMI) DOCUMENTED: ICD-10-PCS | Mod: CPTII,S$GLB,, | Performed by: STUDENT IN AN ORGANIZED HEALTH CARE EDUCATION/TRAINING PROGRAM

## 2023-08-24 PROCEDURE — 3008F BODY MASS INDEX DOCD: CPT | Mod: CPTII,S$GLB,, | Performed by: STUDENT IN AN ORGANIZED HEALTH CARE EDUCATION/TRAINING PROGRAM

## 2023-08-24 PROCEDURE — 99999 PR PBB SHADOW E&M-EST. PATIENT-LVL III: CPT | Mod: PBBFAC,,, | Performed by: STUDENT IN AN ORGANIZED HEALTH CARE EDUCATION/TRAINING PROGRAM

## 2023-08-24 PROCEDURE — 1126F AMNT PAIN NOTED NONE PRSNT: CPT | Mod: CPTII,S$GLB,, | Performed by: STUDENT IN AN ORGANIZED HEALTH CARE EDUCATION/TRAINING PROGRAM

## 2023-08-24 PROCEDURE — 99213 OFFICE O/P EST LOW 20 MIN: CPT | Mod: S$GLB,,, | Performed by: STUDENT IN AN ORGANIZED HEALTH CARE EDUCATION/TRAINING PROGRAM

## 2023-08-24 PROCEDURE — 99213 PR OFFICE/OUTPT VISIT, EST, LEVL III, 20-29 MIN: ICD-10-PCS | Mod: S$GLB,,, | Performed by: STUDENT IN AN ORGANIZED HEALTH CARE EDUCATION/TRAINING PROGRAM

## 2023-08-24 PROCEDURE — 3288F FALL RISK ASSESSMENT DOCD: CPT | Mod: CPTII,S$GLB,, | Performed by: STUDENT IN AN ORGANIZED HEALTH CARE EDUCATION/TRAINING PROGRAM

## 2023-08-24 PROCEDURE — 1159F MED LIST DOCD IN RCRD: CPT | Mod: CPTII,S$GLB,, | Performed by: STUDENT IN AN ORGANIZED HEALTH CARE EDUCATION/TRAINING PROGRAM

## 2023-08-24 PROCEDURE — 1101F PT FALLS ASSESS-DOCD LE1/YR: CPT | Mod: CPTII,S$GLB,, | Performed by: STUDENT IN AN ORGANIZED HEALTH CARE EDUCATION/TRAINING PROGRAM

## 2023-08-24 PROCEDURE — 1126F PR PAIN SEVERITY QUANTIFIED, NO PAIN PRESENT: ICD-10-PCS | Mod: CPTII,S$GLB,, | Performed by: STUDENT IN AN ORGANIZED HEALTH CARE EDUCATION/TRAINING PROGRAM

## 2023-08-24 PROCEDURE — 3078F DIAST BP <80 MM HG: CPT | Mod: CPTII,S$GLB,, | Performed by: STUDENT IN AN ORGANIZED HEALTH CARE EDUCATION/TRAINING PROGRAM

## 2023-08-24 PROCEDURE — 4010F PR ACE/ARB THEARPY RXD/TAKEN: ICD-10-PCS | Mod: CPTII,S$GLB,, | Performed by: STUDENT IN AN ORGANIZED HEALTH CARE EDUCATION/TRAINING PROGRAM

## 2023-08-24 PROCEDURE — 3074F PR MOST RECENT SYSTOLIC BLOOD PRESSURE < 130 MM HG: ICD-10-PCS | Mod: CPTII,S$GLB,, | Performed by: STUDENT IN AN ORGANIZED HEALTH CARE EDUCATION/TRAINING PROGRAM

## 2023-08-24 PROCEDURE — 3078F PR MOST RECENT DIASTOLIC BLOOD PRESSURE < 80 MM HG: ICD-10-PCS | Mod: CPTII,S$GLB,, | Performed by: STUDENT IN AN ORGANIZED HEALTH CARE EDUCATION/TRAINING PROGRAM

## 2023-08-24 PROCEDURE — 3288F PR FALLS RISK ASSESSMENT DOCUMENTED: ICD-10-PCS | Mod: CPTII,S$GLB,, | Performed by: STUDENT IN AN ORGANIZED HEALTH CARE EDUCATION/TRAINING PROGRAM

## 2023-08-24 PROCEDURE — 1101F PR PT FALLS ASSESS DOC 0-1 FALLS W/OUT INJ PAST YR: ICD-10-PCS | Mod: CPTII,S$GLB,, | Performed by: STUDENT IN AN ORGANIZED HEALTH CARE EDUCATION/TRAINING PROGRAM

## 2023-08-24 PROCEDURE — 99999 PR PBB SHADOW E&M-EST. PATIENT-LVL III: ICD-10-PCS | Mod: PBBFAC,,, | Performed by: STUDENT IN AN ORGANIZED HEALTH CARE EDUCATION/TRAINING PROGRAM

## 2023-08-24 PROCEDURE — 3074F SYST BP LT 130 MM HG: CPT | Mod: CPTII,S$GLB,, | Performed by: STUDENT IN AN ORGANIZED HEALTH CARE EDUCATION/TRAINING PROGRAM

## 2023-08-24 PROCEDURE — 1159F PR MEDICATION LIST DOCUMENTED IN MEDICAL RECORD: ICD-10-PCS | Mod: CPTII,S$GLB,, | Performed by: STUDENT IN AN ORGANIZED HEALTH CARE EDUCATION/TRAINING PROGRAM

## 2023-08-24 NOTE — PROGRESS NOTES
[facility] Department of Radiation Oncology  Follow Up Visit Note    Diagnosis:  Fareed Richard Jr. is a 73 y.o. 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 He is s/p adjuvant chemoradiation to 66 Gy in 33 fractions, completed 4/22/22.    Oncologic History:  He has a history of tobacco use ~100 pack years.   12/13/21: met with head and neck surgery, with 3.5cm ulcerated left anteriolateral tongue mass. No buccal or FOM lesions. 3cm, firm, minimally mobile left submandibular adenopathy. No facial weakness.  FNA: of left neck mass with metastatic squamous cell carcinoma  1/4/22: DL, Trach, total glossectomy, bilateral neck dissection of 1-4 and resection of left submandibular cervical skin  Op note: extensive submucosal involvement of the vast majority of the tongue. DL with no other lesions.  Lingual nerve was identified.  Was taken to the skull base and found to be negative for carcinoma  Path: 6 cm primary, with 29 mm DOI, with invasion of the left neck soft tissue, submandibular gland and cervical skin. - SM , extensive PNI. 3/68 LN+ (ITC in left lvl II-IV, 1/5LN+ in left IB, with SALINAS+  1/4/22: anterolateral thigh free flap and advancement flap. Post op stay was complicated by positive respiratory cultures requiring ABx. He was transferred to rehab.   2/28/22-4/22/22: 66 Gy in 33 fractions to the region of SALINAS, 60 Gy in 30 fractions to the operative bed, level I, and left II-IV and 54 Gy to left lvl V and right II-IV.       Interval History  The patient presents today for a regularly scheduled follow up visit.      Since last visit, no oncologic events.    He underwent CT neck and chest 5/2023 without evidence of recurrent disease.     He has no complaints, no throat pain, otalgia, neck masses, new skin lesions. No weight loss. Doing 5-6 cartons per day. Following  closely with derm for skin lesions, and has had treatment for skin cancers. Remains weak, He has had PT since discharge from the hospital. . Spends most of the day watching cowboy movies. Here with his wife.    Review of Systems   ROS  as above    Social History:  Social History     Tobacco Use    Smoking status: Former     Current packs/day: 0.00     Average packs/day: 2.0 packs/day for 55.0 years (110.0 ttl pk-yrs)     Types: Cigarettes     Start date: 1963     Quit date: 2018     Years since quittin.3    Smokeless tobacco: Never    Tobacco comments:     3/3 ppd x 40 yrs. Currently 3-4 cigarettes daily .He is trying  to quit. Is using a Vapor cigarettes  2-3 x's a day.   Substance Use Topics    Alcohol use: Not Currently    Drug use: No       Exam:  Vitals:    23 1109   BP: 124/60   Pulse: 92   Resp: 18   SpO2: (!) 93%   Weight: 61 kg (134 lb 7.7 oz)       Constitutional: Pleasant 73 y.o. male in no acute distress.  Well nourished.   HEENT: s/p total glossectomy, with intact flap. No OPX or OC lesions on direct exam. + trach without any appreciated cutaneous lesions of the anterior neck  Lymph.  No discrete neck mass in the cervical or SCV. Exam limited by post surgical and RT changes  Lungs: No audible wheezing.  Normal effort.   Musculoskeletal: No gross MSK deformities.   GI: PEG tube without drainage, surrounding erythema or tenderness. + granulation tissue  Psych: Alert and oriented with appropriate mood and affect.  Neuro:  Grossly normal.    Data Review:  Information obtained via chart review and discussion with Mr. Richard and his wife.      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 He is s/p adjuvant chemoradiation to 66 Gy in 33 fractions, completed 22.  HARVEY on exam.  Subclinical hypothyroidism, TSH T4 WNL 2023  ECOG:  (3)    Plan:  Will check CBC/CMP given weakness, eval for anemia, hepatic and renal function. He does not want to do this today. Will schedule for him to get labs [place]  He is following with H&N surgery every 3 months.  Ill see him back in 6 months or earlier PRN.       Zaki Brunson MD  Radiation Oncology

## 2023-08-29 ENCOUNTER — LAB VISIT (OUTPATIENT)
Dept: LAB | Facility: HOSPITAL | Age: 73
End: 2023-08-29
Attending: STUDENT IN AN ORGANIZED HEALTH CARE EDUCATION/TRAINING PROGRAM
Payer: MEDICARE

## 2023-08-29 DIAGNOSIS — R53.1 WEAKNESS: ICD-10-CM

## 2023-08-29 DIAGNOSIS — Z09 RADIOTHERAPY FOLLOW-UP: ICD-10-CM

## 2023-08-29 DIAGNOSIS — E78.5 HYPERLIPEMIA: ICD-10-CM

## 2023-08-29 DIAGNOSIS — I10 ESSENTIAL HYPERTENSION, MALIGNANT: Primary | ICD-10-CM

## 2023-08-29 DIAGNOSIS — C02.9 SQUAMOUS CELL CANCER OF TONGUE: ICD-10-CM

## 2023-08-29 LAB
ALBUMIN SERPL BCP-MCNC: 2.7 G/DL (ref 3.5–5.2)
ALP SERPL-CCNC: 96 U/L (ref 55–135)
ALT SERPL W/O P-5'-P-CCNC: 28 U/L (ref 10–44)
ANION GAP SERPL CALC-SCNC: 5 MMOL/L (ref 8–16)
AST SERPL-CCNC: 20 U/L (ref 10–40)
BASOPHILS # BLD AUTO: 0.02 K/UL (ref 0–0.2)
BASOPHILS NFR BLD: 0.3 % (ref 0–1.9)
BILIRUB SERPL-MCNC: 0.2 MG/DL (ref 0.1–1)
BUN SERPL-MCNC: 24 MG/DL (ref 8–23)
CALCIUM SERPL-MCNC: 9.2 MG/DL (ref 8.7–10.5)
CHLORIDE SERPL-SCNC: 96 MMOL/L (ref 95–110)
CHOLEST SERPL-MCNC: 85 MG/DL (ref 120–199)
CHOLEST/HDLC SERPL: 3.4 {RATIO} (ref 2–5)
CO2 SERPL-SCNC: 37 MMOL/L (ref 23–29)
CREAT SERPL-MCNC: 0.8 MG/DL (ref 0.5–1.4)
DIFFERENTIAL METHOD: ABNORMAL
EOSINOPHIL # BLD AUTO: 0.5 K/UL (ref 0–0.5)
EOSINOPHIL NFR BLD: 8.4 % (ref 0–8)
ERYTHROCYTE [DISTWIDTH] IN BLOOD BY AUTOMATED COUNT: 16.4 % (ref 11.5–14.5)
EST. GFR  (NO RACE VARIABLE): >60 ML/MIN/1.73 M^2
GLUCOSE SERPL-MCNC: 169 MG/DL (ref 70–110)
HCT VFR BLD AUTO: 28.9 % (ref 40–54)
HDLC SERPL-MCNC: 25 MG/DL (ref 40–75)
HDLC SERPL: 29.4 % (ref 20–50)
HGB BLD-MCNC: 8.4 G/DL (ref 14–18)
IMM GRANULOCYTES # BLD AUTO: 0.01 K/UL (ref 0–0.04)
IMM GRANULOCYTES NFR BLD AUTO: 0.2 % (ref 0–0.5)
LDLC SERPL CALC-MCNC: 46.4 MG/DL (ref 63–159)
LYMPHOCYTES # BLD AUTO: 0.5 K/UL (ref 1–4.8)
LYMPHOCYTES NFR BLD: 8.4 % (ref 18–48)
MCH RBC QN AUTO: 26.9 PG (ref 27–31)
MCHC RBC AUTO-ENTMCNC: 29.1 G/DL (ref 32–36)
MCV RBC AUTO: 93 FL (ref 82–98)
MONOCYTES # BLD AUTO: 0.6 K/UL (ref 0.3–1)
MONOCYTES NFR BLD: 9.2 % (ref 4–15)
NEUTROPHILS # BLD AUTO: 4.6 K/UL (ref 1.8–7.7)
NEUTROPHILS NFR BLD: 73.5 % (ref 38–73)
NONHDLC SERPL-MCNC: 60 MG/DL
NRBC BLD-RTO: 0 /100 WBC
PLATELET # BLD AUTO: 255 K/UL (ref 150–450)
PMV BLD AUTO: 8.9 FL (ref 9.2–12.9)
POTASSIUM SERPL-SCNC: 4.9 MMOL/L (ref 3.5–5.1)
PROT SERPL-MCNC: 7.5 G/DL (ref 6–8.4)
RBC # BLD AUTO: 3.12 M/UL (ref 4.6–6.2)
SODIUM SERPL-SCNC: 138 MMOL/L (ref 136–145)
T4 FREE SERPL-MCNC: 0.86 NG/DL (ref 0.71–1.51)
TRIGL SERPL-MCNC: 68 MG/DL (ref 30–150)
TSH SERPL DL<=0.005 MIU/L-ACNC: 4.46 UIU/ML (ref 0.4–4)
WBC # BLD AUTO: 6.28 K/UL (ref 3.9–12.7)

## 2023-08-29 PROCEDURE — 80053 COMPREHEN METABOLIC PANEL: CPT | Performed by: STUDENT IN AN ORGANIZED HEALTH CARE EDUCATION/TRAINING PROGRAM

## 2023-08-29 PROCEDURE — 85025 COMPLETE CBC W/AUTO DIFF WBC: CPT | Performed by: STUDENT IN AN ORGANIZED HEALTH CARE EDUCATION/TRAINING PROGRAM

## 2023-08-29 PROCEDURE — 36415 COLL VENOUS BLD VENIPUNCTURE: CPT | Performed by: NURSE PRACTITIONER

## 2023-08-29 PROCEDURE — 80061 LIPID PANEL: CPT | Performed by: NURSE PRACTITIONER

## 2023-08-29 PROCEDURE — 83036 HEMOGLOBIN GLYCOSYLATED A1C: CPT | Performed by: NURSE PRACTITIONER

## 2023-08-29 PROCEDURE — 84443 ASSAY THYROID STIM HORMONE: CPT | Performed by: NURSE PRACTITIONER

## 2023-08-29 PROCEDURE — 84439 ASSAY OF FREE THYROXINE: CPT | Performed by: STUDENT IN AN ORGANIZED HEALTH CARE EDUCATION/TRAINING PROGRAM

## 2023-08-30 ENCOUNTER — TELEPHONE (OUTPATIENT)
Dept: RADIATION ONCOLOGY | Facility: CLINIC | Age: 73
End: 2023-08-30
Payer: MEDICARE

## 2023-08-30 LAB
ESTIMATED AVG GLUCOSE: 120 MG/DL (ref 68–131)
HBA1C MFR BLD: 5.8 % (ref 4–5.6)

## 2023-08-30 NOTE — TELEPHONE ENCOUNTER
Called and relayed results. Notable that bicarb is higher otherwise relatively stable.  Home sats and HR remain stable. Weakness may be due to deconditioning (they have already worked with PT), but reached out cards to see if he can be optimized any further.     Zaki Brunson MD  Radiation Oncology

## 2023-09-09 NOTE — RESPIRATORY THERAPY
Pt given apple juice for low blood sugar. Pt alert and talking. Dr. Lory Tse at bedside.      Yonathan Gardiner RN  09/09/23 8194 RAPID RESPONSE RESPIRATORY THERAPY PROACTIVE NOTE           Time of visit:      Code Status: Full Code   : 1950  Bed: 8094/8094 A:   MRN: [id number]  Time spent at the bedside: < 15 min    SITUATION    Evaluated patient for: LDA Check     BACKGROUND    Patient has a past medical history of Cancer, COPD (chronic obstructive pulmonary disease), Hyperlipidemia, Hypertension, and Pseudoaneurysm.  Clinically Significant Surgical Hx: tracheostomy    24 Hours Vitals Range:  Temp:  [97.6 °F (36.4 °C)-98.6 °F (37 °C)]   Pulse:  []   Resp:  [14-28]   BP: (108-167)/(67-86)   SpO2:  [88 %-95 %]     Labs:    Recent Labs     22  0937 22  0328 22  0931    138 137   K 3.6 4.0 4.0   CL 98 98 98   CO2 32* 32* 28   CREATININE 0.7 0.7 0.6   * 110 155*   PHOS 3.2 3.8 3.0   MG 1.9 2.1 1.9        No results for input(s): PH, PCO2, PO2, HCO3, POCSATURATED, BE in the last 72 hours.    ASSESSMENT/INTERVENTIONS    Upon arrival in room all supplies at bedside. Extra Shiley trachs size 4.0 cuffed and 6.0 cuffed at bedside.    Last VS   Temp: 98.2 °F (36.8 °C) (1531)  Pulse: 101 (1531)  Resp: 22 (1531)  BP: 108/70 (1531)  SpO2: 92 % (1531)    Level of Consciousness: Level of Consciousness (AVPU): alert  Respiratory Effort: Respiratory Effort: Unlabored Expansion/Accessory Muscle Usage: Expansion/Accessory Muscles/Retractions: no use of accessory muscles, expansion symmetric  All Lung Field Breath Sounds: All Lung Fields Breath Sounds: clear  DEE Breath Sounds: diminished  LLL Breath Sounds: diminished  RUL Breath Sounds: diminished  RML Breath Sounds: diminished  RLL Breath Sounds: diminished  O2 Device/Concentration: Flow (L/min): 8, Oxygen Concentration (%): 35, O2 Device (Oxygen Therapy): Trach Collar  Surgical airway: Yes, Type: Shiley Size: 6, uncuffed  Ambu at bedside: Ambu bag with the patient?: Yes, Adult Ambu     Active Orders   Respiratory Care    Chest  physiotherapy TID     Frequency: TID     Number of Occurrences: Until Specified     Order Questions:      Indications: COPIOUS SPUTUM PRODUCTION      Indications: ATELECTASIS PROPHYLAXIS      Indications: ATELECTASIS NONRESPONSIVE TO TX    Inhalation Treatment Q4H     Frequency: Q4H     Number of Occurrences: Until Specified    Oxygen Continuous     Frequency: Continuous     Number of Occurrences: Until Specified     Order Questions:      Device type: Low flow      Device: Trach Collar (Comment: 8L)      FiO2%: 35%      Titrate O2 per Oxygen Titration Protocol: Yes      To maintain SpO2 goal of: >= 90%      Notify MD of: Inability to achieve desired SpO2; Sudden change in patient status and requires 20% increase in FiO2; Patient requires >60% FiO2    Pulse Oximetry Continuous     Frequency: Continuous     Number of Occurrences: Until Specified    Routine tracheostomy care     Frequency: BID     Number of Occurrences: Until Specified       RECOMMENDATIONS    We recommend: RRT Recs: Continue POC per primary team.    FOLLOW-UP    Please call back the Rapid Response RT, Andreea Gardner, RRT at x [phone] for any questions or concerns.

## 2023-10-09 ENCOUNTER — OFFICE VISIT (OUTPATIENT)
Dept: OTOLARYNGOLOGY | Facility: CLINIC | Age: 73
End: 2023-10-09
Payer: MEDICARE

## 2023-10-09 VITALS
HEIGHT: 68 IN | SYSTOLIC BLOOD PRESSURE: 155 MMHG | HEART RATE: 76 BPM | WEIGHT: 134.94 LBS | DIASTOLIC BLOOD PRESSURE: 95 MMHG | BODY MASS INDEX: 20.45 KG/M2

## 2023-10-09 DIAGNOSIS — C02.9 SQUAMOUS CELL CANCER OF TONGUE: Primary | ICD-10-CM

## 2023-10-09 PROCEDURE — 3077F SYST BP >= 140 MM HG: CPT | Mod: CPTII,S$GLB,, | Performed by: OTOLARYNGOLOGY

## 2023-10-09 PROCEDURE — 4010F ACE/ARB THERAPY RXD/TAKEN: CPT | Mod: CPTII,S$GLB,, | Performed by: OTOLARYNGOLOGY

## 2023-10-09 PROCEDURE — 3044F PR MOST RECENT HEMOGLOBIN A1C LEVEL <7.0%: ICD-10-PCS | Mod: CPTII,S$GLB,, | Performed by: OTOLARYNGOLOGY

## 2023-10-09 PROCEDURE — 99999 PR PBB SHADOW E&M-EST. PATIENT-LVL III: ICD-10-PCS | Mod: PBBFAC,,, | Performed by: OTOLARYNGOLOGY

## 2023-10-09 PROCEDURE — 3008F PR BODY MASS INDEX (BMI) DOCUMENTED: ICD-10-PCS | Mod: CPTII,S$GLB,, | Performed by: OTOLARYNGOLOGY

## 2023-10-09 PROCEDURE — 1126F PR PAIN SEVERITY QUANTIFIED, NO PAIN PRESENT: ICD-10-PCS | Mod: CPTII,S$GLB,, | Performed by: OTOLARYNGOLOGY

## 2023-10-09 PROCEDURE — 3080F PR MOST RECENT DIASTOLIC BLOOD PRESSURE >= 90 MM HG: ICD-10-PCS | Mod: CPTII,S$GLB,, | Performed by: OTOLARYNGOLOGY

## 2023-10-09 PROCEDURE — 1159F PR MEDICATION LIST DOCUMENTED IN MEDICAL RECORD: ICD-10-PCS | Mod: CPTII,S$GLB,, | Performed by: OTOLARYNGOLOGY

## 2023-10-09 PROCEDURE — 3008F BODY MASS INDEX DOCD: CPT | Mod: CPTII,S$GLB,, | Performed by: OTOLARYNGOLOGY

## 2023-10-09 PROCEDURE — 99213 PR OFFICE/OUTPT VISIT, EST, LEVL III, 20-29 MIN: ICD-10-PCS | Mod: S$GLB,,, | Performed by: OTOLARYNGOLOGY

## 2023-10-09 PROCEDURE — 99213 OFFICE O/P EST LOW 20 MIN: CPT | Mod: S$GLB,,, | Performed by: OTOLARYNGOLOGY

## 2023-10-09 PROCEDURE — 3044F HG A1C LEVEL LT 7.0%: CPT | Mod: CPTII,S$GLB,, | Performed by: OTOLARYNGOLOGY

## 2023-10-09 PROCEDURE — 1160F RVW MEDS BY RX/DR IN RCRD: CPT | Mod: CPTII,S$GLB,, | Performed by: OTOLARYNGOLOGY

## 2023-10-09 PROCEDURE — 3080F DIAST BP >= 90 MM HG: CPT | Mod: CPTII,S$GLB,, | Performed by: OTOLARYNGOLOGY

## 2023-10-09 PROCEDURE — 4010F PR ACE/ARB THEARPY RXD/TAKEN: ICD-10-PCS | Mod: CPTII,S$GLB,, | Performed by: OTOLARYNGOLOGY

## 2023-10-09 PROCEDURE — 99999 PR PBB SHADOW E&M-EST. PATIENT-LVL III: CPT | Mod: PBBFAC,,, | Performed by: OTOLARYNGOLOGY

## 2023-10-09 PROCEDURE — 1160F PR REVIEW ALL MEDS BY PRESCRIBER/CLIN PHARMACIST DOCUMENTED: ICD-10-PCS | Mod: CPTII,S$GLB,, | Performed by: OTOLARYNGOLOGY

## 2023-10-09 PROCEDURE — 1126F AMNT PAIN NOTED NONE PRSNT: CPT | Mod: CPTII,S$GLB,, | Performed by: OTOLARYNGOLOGY

## 2023-10-09 PROCEDURE — 3077F PR MOST RECENT SYSTOLIC BLOOD PRESSURE >= 140 MM HG: ICD-10-PCS | Mod: CPTII,S$GLB,, | Performed by: OTOLARYNGOLOGY

## 2023-10-09 PROCEDURE — 1159F MED LIST DOCD IN RCRD: CPT | Mod: CPTII,S$GLB,, | Performed by: OTOLARYNGOLOGY

## 2023-10-09 RX ORDER — PREDNISONE 20 MG/1
TABLET ORAL
COMMUNITY
Start: 2023-10-04 | End: 2023-10-09

## 2023-10-09 NOTE — PROGRESS NOTES
CC: Cancer surveillance    Oncology History   Squamous cell cancer of tongue   12/16/2021 Initial Diagnosis    Squamous cell cancer of tongue     12/16/2021 Tumor Conference       His case was discussed at the Multidisciplinary Head and Neck Team Planning Meeting.    Representatives from Medical Oncology, Radiation Oncology, Head and Neck Surgical Oncology, Psychosocial Oncology, and Speech and Language Pathology discussed the case with the following recommendations:    1) surgery  2) work up lung nodule post op         1/2/2022 Cancer Staged    Staging form: Oral Cavity, AJCC 8th Edition  - Clinical stage from 1/2/2022: Stage NAFISA (cT2, cN2a, cM0)     1/4/2022 Cancer Staged    Staging form: Oral Cavity, AJCC 8th Edition  - Pathologic stage from 1/4/2022: Stage IVB (pT4a, pN3b, cM0)     2/28/2022 - 4/6/2022 Chemotherapy    Treatment Summary   Plan Name: OP HEAD NECK CISPLATIN WEEKLY + RADIOTHERAPY  Treatment Goal: Curative  Status: Inactive  Start Date: 2/28/2022  End Date: 4/6/2022  Provider: Christopher Jay MD  Chemotherapy: CISplatin (PLATINOL) 40 mg/m2 = 69 mg in sodium chloride 0.9% 500 mL chemo infusion, 40 mg/m2 = 69 mg, Intravenous, Clinic/HOD 1 time, 6 of 7 cycles  Administration: 69 mg (2/28/2022), 66 mg (3/7/2022), 69 mg (3/16/2022), 69 mg (3/23/2022), 69 mg (3/30/2022), 70 mg (4/6/2022)      Radiation Therapy    Treatment Summary  Course: C1 HN 2022  Treatment Site Ref. ID Energy Dose/Fx (Gy) #Fx Dose Correction (Gy) Total Dose (Gy) Start Date End Date Elapsed Days   IM H&N:1 PTV_66 6X 2 7 / 7 0 14 2/28/2022 3/10/2022 10   IM H&N2 PTV_66 6X 2 26 / 26 0 51.903 3/11/2022 4/22/2022 42      Secondary malignant neoplasm of cervical lymph node   2/16/2022 Initial Diagnosis    Secondary malignant neoplasm of cervical lymph node     2/28/2022 - 4/6/2022 Chemotherapy    Treatment Summary   Plan Name: OP HEAD NECK CISPLATIN WEEKLY + RADIOTHERAPY  Treatment Goal: Curative  Status: Inactive  Start Date: 2/28/2022  End  Date: 4/6/2022  Provider: Christopher Jay MD  Chemotherapy: CISplatin (PLATINOL) 40 mg/m2 = 69 mg in sodium chloride 0.9% 500 mL chemo infusion, 40 mg/m2 = 69 mg, Intravenous, Clinic/HOD 1 time, 6 of 7 cycles  Administration: 69 mg (2/28/2022), 66 mg (3/7/2022), 69 mg (3/16/2022), 69 mg (3/23/2022), 69 mg (3/30/2022), 70 mg (4/6/2022)           HPI   73 y.o. male presents with the above treatment history. No new complaints.    Review of Systems   Constitutional: Negative for fatigue and unexpected weight change.   HENT: Per HPI.  Eyes: Negative for visual disturbance.   Respiratory: Negative for shortness of breath, hemoptysis   Cardiovascular: Negative for chest pain and palpitations.   Musculoskeletal: Negative for decreased ROM, back pain.   Skin: Negative for rash, sunburn, itching.   Neurological: Negative for dizziness and seizures.   Hematological: Negative for adenopathy. Does not bruise/bleed easily.   Endocrine: Negative for rapid weight loss/weight gain, heat/cold intolerance.     Past Medical History   Patient Active Problem List   Diagnosis    Peripheral vascular disease    Carotid stenosis    Squamous cell cancer of tongue    Coronary artery disease involving native coronary artery of native heart with unstable angina pectoris    Primary hypertension    Other hyperlipidemia    Impaired mobility and ADLs    Tracheobronchitis    Toxic effect of tobacco cigarette, intentional self-harm    COPD exacerbation    Acute blood loss anemia    Secondary malignant neoplasm of cervical lymph node    Severe protein-calorie malnutrition    Dysphagia    Aspiration pneumonia    ACP (advance care planning)    Pulmonary infiltrate    Bloody sputum    Tracheostomy present    PEG (percutaneous endoscopic gastrostomy) status    Acute tracheitis    Gastrointestinal hemorrhage with melena    Hemoptysis    Anxiety    Retroperitoneal hematoma    Pleural effusion    Postprocedural pneumothorax    Aortic root dilatation            Past Surgical History   Past Surgical History:   Procedure Laterality Date    ABDOMINAL SURGERY      stents placed in liver and large intestines, per patient    ANGIOGRAPHY OF AORTIC ARCH  3/27/2023    Procedure: Aortogram, Aortic Arch;  Surgeon: Tim Bhatt MD;  Location: [facility] CATH LAB;  Service: Cardiology;;    AORTOGRAPHY WITH SERIALOGRAPHY N/A 3/27/2023    Procedure: AORTOGRAM, WITH SERIALOGRAPHY;  Surgeon: Tmi Bhatt MD;  Location: [facility] CATH LAB;  Service: Cardiology;  Laterality: N/A;    CAROTID STENT      COLONOSCOPY N/A 09/27/2022    Procedure: COLONOSCOPY;  Surgeon: Donnie Peterson MD;  Location: [facility] ENDO (2ND FLR);  Service: Endoscopy;  Laterality: N/A;    COLONOSCOPY N/A 09/30/2022    Procedure: COLONOSCOPY;  Surgeon: Joy Cabrera MD;  Location: [facility] ENDO (2ND FLR);  Service: Endoscopy;  Laterality: N/A;    CORONARY STENT PLACEMENT  01/2000    DISSECTION OF NECK Bilateral 01/04/2022    Procedure: DISSECTION, NECK;  Surgeon: Naeem Smalls MD;  Location: [facility] OR 2ND FLR;  Service: ENT;  Laterality: Bilateral;    ESOPHAGOGASTRODUODENOSCOPY N/A 09/27/2022    Procedure: EGD (ESOPHAGOGASTRODUODENOSCOPY);  Surgeon: Donnie Peterson MD;  Location: [facility] ENDO (2ND FLR);  Service: Endoscopy;  Laterality: N/A;    ESOPHAGOGASTRODUODENOSCOPY N/A 09/30/2022    Procedure: EGD (ESOPHAGOGASTRODUODENOSCOPY);  Surgeon: Joy Cabrera MD;  Location: [facility] ENDO (2ND FLR);  Service: Endoscopy;  Laterality: N/A;    ESOPHAGOGASTRODUODENOSCOPY W/ PEG N/A 01/04/2022    Procedure: EGD, WITH PEG TUBE INSERTION;  Surgeon: Anthony Calabrese MD;  Location: [facility] OR 2ND FLR;  Service: General;  Laterality: N/A;    EYE SURGERY      Cataract bilateral    femoral stents      bilateral    FLAP PROCEDURE N/A 01/03/2022    Procedure: CREATION, FREE FLAP;  Surgeon: Naeem Smalls MD;  Location: [facility] OR 2ND FLR;  Service: ENT;  Laterality: N/A;    FLAP PROCEDURE Right 01/04/2022    Procedure: CREATION, FREE  FLAP;  Surgeon: Stacey Conde MD;  [facility];  Service: ENT;  Laterality: Right;  Ischemic start 1203  Ischemic stop 1353    GLOSSECTOMY N/A 2022    Procedure: GLOSSECTOMY;  Surgeon: Naeem Smalls MD;  [facility];  Service: ENT;  Laterality: N/A;    LEFT HEART CATHETERIZATION Left 3/23/2023    Procedure: Left heart cath;  Surgeon: Tacos Benitez MD;  Location: [facility] CATH LAB;  Service: Cardiology;  Laterality: Left;    PERCUTANEOUS TRANSLUMINAL BALLOON ANGIOPLASTY OF CORONARY ARTERY Left 3/27/2023    Procedure: Angioplasty-coronary;  Surgeon: Tim Bhatt MD;  Location: [facility] CATH LAB;  Service: Cardiology;  Laterality: Left;  not before 9am, R rad access, 6 Fr EBU 3.5 guide    RADICAL NECK DISSECTION Left 2022    Procedure: DISSECTION, NECK, RADICAL;  Surgeon: Naeem Smalls MD;  Location: [facility] OR [address] [place] [address];  Service: ENT;  Laterality: Left;    SKIN BIOPSY      SKIN CANCER EXCISION      STENT, CORONARY, MULTI VESSEL  3/27/2023    Procedure: Stent, Coronary, Multi Vessel;  Surgeon: Tim Bhatt MD;  Location: [facility] CATH LAB;  Service: Cardiology;;    TRACHEOTOMY N/A 2022    Procedure: TRACHEOTOMY;  Surgeon: Naeem Smalls MD;  Location: [facility] OR [address] [place] [address];  Service: ENT;  Laterality: N/A;    VASCULAR SURGERY           Family History   No family history on file.        Social History   .  Social History     Socioeconomic History    Marital status:    Tobacco Use    Smoking status: Former     Current packs/day: 0.00     Average packs/day: 2.0 packs/day for 55.0 years (110.0 ttl pk-yrs)     Types: Cigarettes     Start date: 1963     Quit date: 2018     Years since quittin.4    Smokeless tobacco: Never    Tobacco comments:     3/3 ppd x 40 yrs. Currently 3-4 cigarettes daily .He is trying  to quit. Is using a Vapor cigarettes  2-3 x's a day.   Substance and Sexual Activity    Alcohol use: Not Currently    Drug use: No     Sexual activity: Not Currently         Allergies   Review of patient's allergies indicates:   Allergen Reactions    Ativan [lorazepam] Anxiety           Physical Exam     Vitals:    10/09/23 1047   BP: (!) 155/95   Pulse: 76             Body mass index is 20.51 kg/m².      General: AOx3, NAD   Respiratory:  Symmetric chest rise, normal effort  Oral cavity/oropharynx:  No worrisome lesions.  Flap well incorporated.  Neck:  Well-healed neck scars.  Size 6 uncuffed Shiley tracheostomy tube in place- exchanged for identical tube.  No LAD.  Face: House Brackmann I bilaterally.         Assessment/Plan  Problem List Items Addressed This Visit          Oncology    Squamous cell cancer of tongue - Primary     HARVEY.  RTC 3 mos.

## 2023-10-20 ENCOUNTER — HOSPITAL ENCOUNTER (INPATIENT)
Facility: HOSPITAL | Age: 73
LOS: 8 days | Discharge: HOME | DRG: 189 | End: 2023-10-28
Attending: EMERGENCY MEDICINE | Admitting: STUDENT IN AN ORGANIZED HEALTH CARE EDUCATION/TRAINING PROGRAM
Payer: MEDICARE

## 2023-10-20 DIAGNOSIS — K94.23 PEG TUBE MALFUNCTION: ICD-10-CM

## 2023-10-20 DIAGNOSIS — R06.02 SHORTNESS OF BREATH: ICD-10-CM

## 2023-10-20 DIAGNOSIS — I45.9 INTRAVENTRICULAR CONDUCTION DELAY: ICD-10-CM

## 2023-10-20 DIAGNOSIS — J44.1 COPD EXACERBATION: Primary | ICD-10-CM

## 2023-10-20 DIAGNOSIS — R79.89 ELEVATED BRAIN NATRIURETIC PEPTIDE (BNP) LEVEL: ICD-10-CM

## 2023-10-20 DIAGNOSIS — C77.0 SECONDARY MALIGNANT NEOPLASM OF CERVICAL LYMPH NODE: ICD-10-CM

## 2023-10-20 DIAGNOSIS — Z71.89 GOALS OF CARE, COUNSELING/DISCUSSION: ICD-10-CM

## 2023-10-20 DIAGNOSIS — J96.21 ACUTE ON CHRONIC RESPIRATORY FAILURE WITH HYPOXIA: ICD-10-CM

## 2023-10-20 DIAGNOSIS — I50.33 ACUTE ON CHRONIC DIASTOLIC HEART FAILURE: ICD-10-CM

## 2023-10-20 DIAGNOSIS — R09.02 HYPOXIA: ICD-10-CM

## 2023-10-20 DIAGNOSIS — R79.89 ELEVATED TROPONIN: ICD-10-CM

## 2023-10-20 PROBLEM — J96.01 ACUTE HYPOXEMIC RESPIRATORY FAILURE: Status: ACTIVE | Noted: 2023-10-20

## 2023-10-20 LAB
ALBUMIN SERPL BCP-MCNC: 3 G/DL (ref 3.5–5.2)
ALLENS TEST: ABNORMAL
ALP SERPL-CCNC: 100 U/L (ref 55–135)
ALT SERPL W/O P-5'-P-CCNC: 21 U/L (ref 10–44)
ANION GAP SERPL CALC-SCNC: 10 MMOL/L (ref 8–16)
AST SERPL-CCNC: 18 U/L (ref 10–40)
BASOPHILS # BLD AUTO: 0.03 K/UL (ref 0–0.2)
BASOPHILS NFR BLD: 0.3 % (ref 0–1.9)
BILIRUB SERPL-MCNC: 0.4 MG/DL (ref 0.1–1)
BNP SERPL-MCNC: 994 PG/ML (ref 0–99)
BUN SERPL-MCNC: 23 MG/DL (ref 8–23)
CALCIUM SERPL-MCNC: 9.4 MG/DL (ref 8.7–10.5)
CHLORIDE SERPL-SCNC: 96 MMOL/L (ref 95–110)
CO2 SERPL-SCNC: 30 MMOL/L (ref 23–29)
CREAT SERPL-MCNC: 0.8 MG/DL (ref 0.5–1.4)
DIFFERENTIAL METHOD: ABNORMAL
EOSINOPHIL # BLD AUTO: 0.3 K/UL (ref 0–0.5)
EOSINOPHIL NFR BLD: 2.8 % (ref 0–8)
ERYTHROCYTE [DISTWIDTH] IN BLOOD BY AUTOMATED COUNT: 14.7 % (ref 11.5–14.5)
EST. GFR  (NO RACE VARIABLE): >60 ML/MIN/1.73 M^2
GLUCOSE SERPL-MCNC: 141 MG/DL (ref 70–110)
HCO3 UR-SCNC: 33.3 MMOL/L (ref 24–28)
HCT VFR BLD AUTO: 35.9 % (ref 40–54)
HGB BLD-MCNC: 10.9 G/DL (ref 14–18)
IMM GRANULOCYTES # BLD AUTO: 0.03 K/UL (ref 0–0.04)
IMM GRANULOCYTES NFR BLD AUTO: 0.3 % (ref 0–0.5)
LYMPHOCYTES # BLD AUTO: 0.5 K/UL (ref 1–4.8)
LYMPHOCYTES NFR BLD: 6.1 % (ref 18–48)
MCH RBC QN AUTO: 27.4 PG (ref 27–31)
MCHC RBC AUTO-ENTMCNC: 30.4 G/DL (ref 32–36)
MCV RBC AUTO: 90 FL (ref 82–98)
MONOCYTES # BLD AUTO: 0.7 K/UL (ref 0.3–1)
MONOCYTES NFR BLD: 8 % (ref 4–15)
NEUTROPHILS # BLD AUTO: 7.3 K/UL (ref 1.8–7.7)
NEUTROPHILS NFR BLD: 82.5 % (ref 38–73)
NRBC BLD-RTO: 0 /100 WBC
PCO2 BLDA: 49.3 MMHG (ref 35–45)
PH SMN: 7.44 [PH] (ref 7.35–7.45)
PLATELET # BLD AUTO: 152 K/UL (ref 150–450)
PMV BLD AUTO: 10 FL (ref 9.2–12.9)
PO2 BLDA: 55 MMHG (ref 80–100)
POC BE: 8 MMOL/L
POC SATURATED O2: 89 % (ref 95–100)
POC TCO2: 35 MMOL/L (ref 23–27)
POTASSIUM SERPL-SCNC: 4.6 MMOL/L (ref 3.5–5.1)
PROCALCITONIN SERPL IA-MCNC: 0.08 NG/ML
PROT SERPL-MCNC: 7.2 G/DL (ref 6–8.4)
RBC # BLD AUTO: 3.98 M/UL (ref 4.6–6.2)
SAMPLE: ABNORMAL
SITE: ABNORMAL
SODIUM SERPL-SCNC: 136 MMOL/L (ref 136–145)
TROPONIN I SERPL DL<=0.01 NG/ML-MCNC: 0.05 NG/ML (ref 0–0.03)
WBC # BLD AUTO: 8.82 K/UL (ref 3.9–12.7)

## 2023-10-20 PROCEDURE — 63600175 PHARM REV CODE 636 W HCPCS: Performed by: STUDENT IN AN ORGANIZED HEALTH CARE EDUCATION/TRAINING PROGRAM

## 2023-10-20 PROCEDURE — 99223 1ST HOSP IP/OBS HIGH 75: CPT | Mod: ,,, | Performed by: REGISTERED NURSE

## 2023-10-20 PROCEDURE — 94640 AIRWAY INHALATION TREATMENT: CPT

## 2023-10-20 PROCEDURE — 99497 ADVNCD CARE PLAN 30 MIN: CPT | Mod: 25,,, | Performed by: REGISTERED NURSE

## 2023-10-20 PROCEDURE — 87205 SMEAR GRAM STAIN: CPT | Performed by: STUDENT IN AN ORGANIZED HEALTH CARE EDUCATION/TRAINING PROGRAM

## 2023-10-20 PROCEDURE — 93010 ELECTROCARDIOGRAM REPORT: CPT | Mod: ,,, | Performed by: INTERNAL MEDICINE

## 2023-10-20 PROCEDURE — 83880 ASSAY OF NATRIURETIC PEPTIDE: CPT | Performed by: EMERGENCY MEDICINE

## 2023-10-20 PROCEDURE — 99291 CRITICAL CARE FIRST HOUR: CPT | Mod: ,,, | Performed by: STUDENT IN AN ORGANIZED HEALTH CARE EDUCATION/TRAINING PROGRAM

## 2023-10-20 PROCEDURE — 96374 THER/PROPH/DIAG INJ IV PUSH: CPT

## 2023-10-20 PROCEDURE — 27100092 HC HIGH FLOW DELIVERY CANNULA

## 2023-10-20 PROCEDURE — 84484 ASSAY OF TROPONIN QUANT: CPT | Performed by: EMERGENCY MEDICINE

## 2023-10-20 PROCEDURE — 99900026 HC AIRWAY MAINTENANCE (STAT)

## 2023-10-20 PROCEDURE — 93010 EKG 12-LEAD: ICD-10-PCS | Mod: ,,, | Performed by: INTERNAL MEDICINE

## 2023-10-20 PROCEDURE — 85025 COMPLETE CBC W/AUTO DIFF WBC: CPT | Performed by: EMERGENCY MEDICINE

## 2023-10-20 PROCEDURE — 87070 CULTURE OTHR SPECIMN AEROBIC: CPT | Performed by: STUDENT IN AN ORGANIZED HEALTH CARE EDUCATION/TRAINING PROGRAM

## 2023-10-20 PROCEDURE — 99900035 HC TECH TIME PER 15 MIN (STAT)

## 2023-10-20 PROCEDURE — 25000242 PHARM REV CODE 250 ALT 637 W/ HCPCS: Performed by: EMERGENCY MEDICINE

## 2023-10-20 PROCEDURE — 94761 N-INVAS EAR/PLS OXIMETRY MLT: CPT

## 2023-10-20 PROCEDURE — 25000242 PHARM REV CODE 250 ALT 637 W/ HCPCS: Performed by: STUDENT IN AN ORGANIZED HEALTH CARE EDUCATION/TRAINING PROGRAM

## 2023-10-20 PROCEDURE — 80053 COMPREHEN METABOLIC PANEL: CPT | Performed by: EMERGENCY MEDICINE

## 2023-10-20 PROCEDURE — 94799 UNLISTED PULMONARY SVC/PX: CPT

## 2023-10-20 PROCEDURE — 27000249 HC VAPOTHERM CIRCUIT

## 2023-10-20 PROCEDURE — 25000003 PHARM REV CODE 250: Performed by: STUDENT IN AN ORGANIZED HEALTH CARE EDUCATION/TRAINING PROGRAM

## 2023-10-20 PROCEDURE — 63600175 PHARM REV CODE 636 W HCPCS: Performed by: EMERGENCY MEDICINE

## 2023-10-20 PROCEDURE — 36600 WITHDRAWAL OF ARTERIAL BLOOD: CPT

## 2023-10-20 PROCEDURE — 87186 SC STD MICRODIL/AGAR DIL: CPT | Mod: 59 | Performed by: STUDENT IN AN ORGANIZED HEALTH CARE EDUCATION/TRAINING PROGRAM

## 2023-10-20 PROCEDURE — 20000000 HC ICU ROOM

## 2023-10-20 PROCEDURE — 27100171 HC OXYGEN HIGH FLOW UP TO 24 HOURS

## 2023-10-20 PROCEDURE — 84145 PROCALCITONIN (PCT): CPT | Performed by: STUDENT IN AN ORGANIZED HEALTH CARE EDUCATION/TRAINING PROGRAM

## 2023-10-20 PROCEDURE — 99291 PR CRITICAL CARE, E/M 30-74 MINUTES: ICD-10-PCS | Mod: ,,, | Performed by: STUDENT IN AN ORGANIZED HEALTH CARE EDUCATION/TRAINING PROGRAM

## 2023-10-20 PROCEDURE — 99223 PR INITIAL HOSPITAL CARE,LEVL III: ICD-10-PCS | Mod: ,,, | Performed by: REGISTERED NURSE

## 2023-10-20 PROCEDURE — 93005 ELECTROCARDIOGRAM TRACING: CPT

## 2023-10-20 PROCEDURE — 87077 CULTURE AEROBIC IDENTIFY: CPT | Mod: 59 | Performed by: STUDENT IN AN ORGANIZED HEALTH CARE EDUCATION/TRAINING PROGRAM

## 2023-10-20 PROCEDURE — 99291 CRITICAL CARE FIRST HOUR: CPT

## 2023-10-20 PROCEDURE — 82803 BLOOD GASES ANY COMBINATION: CPT

## 2023-10-20 PROCEDURE — 99497 PR ADVNCD CARE PLAN 30 MIN: ICD-10-PCS | Mod: 25,,, | Performed by: REGISTERED NURSE

## 2023-10-20 RX ORDER — METOPROLOL TARTRATE 25 MG/1
12.5 TABLET ORAL 2 TIMES DAILY
Status: DISCONTINUED | OUTPATIENT
Start: 2023-10-20 | End: 2023-10-20

## 2023-10-20 RX ORDER — NAPROXEN SODIUM 220 MG/1
81 TABLET, FILM COATED ORAL DAILY
Status: DISCONTINUED | OUTPATIENT
Start: 2023-10-20 | End: 2023-10-20

## 2023-10-20 RX ORDER — PANTOPRAZOLE SODIUM 40 MG/10ML
40 INJECTION, POWDER, LYOPHILIZED, FOR SOLUTION INTRAVENOUS DAILY
Status: DISCONTINUED | OUTPATIENT
Start: 2023-10-21 | End: 2023-10-28 | Stop reason: HOSPADM

## 2023-10-20 RX ORDER — HYDROXYZINE HYDROCHLORIDE 25 MG/1
25 TABLET, FILM COATED ORAL 3 TIMES DAILY PRN
Status: DISCONTINUED | OUTPATIENT
Start: 2023-10-20 | End: 2023-10-28 | Stop reason: HOSPADM

## 2023-10-20 RX ORDER — ONDANSETRON 4 MG/1
4 TABLET, ORALLY DISINTEGRATING ORAL EVERY 8 HOURS PRN
Status: DISCONTINUED | OUTPATIENT
Start: 2023-10-20 | End: 2023-10-28 | Stop reason: HOSPADM

## 2023-10-20 RX ORDER — ATORVASTATIN CALCIUM 40 MG/1
40 TABLET, FILM COATED ORAL NIGHTLY
Status: DISCONTINUED | OUTPATIENT
Start: 2023-10-20 | End: 2023-10-28 | Stop reason: HOSPADM

## 2023-10-20 RX ORDER — DOXYCYCLINE HYCLATE 100 MG
100 TABLET ORAL EVERY 12 HOURS
Status: DISCONTINUED | OUTPATIENT
Start: 2023-10-20 | End: 2023-10-20

## 2023-10-20 RX ORDER — PANTOPRAZOLE SODIUM 40 MG/1
40 TABLET, DELAYED RELEASE ORAL DAILY
Status: DISCONTINUED | OUTPATIENT
Start: 2023-10-20 | End: 2023-10-20

## 2023-10-20 RX ORDER — FLUOXETINE 20 MG/5ML
20 SOLUTION ORAL DAILY
Status: ON HOLD | COMMUNITY
Start: 2023-10-13 | End: 2024-01-24

## 2023-10-20 RX ORDER — NAPROXEN SODIUM 220 MG/1
81 TABLET, FILM COATED ORAL DAILY
Status: DISCONTINUED | OUTPATIENT
Start: 2023-10-21 | End: 2023-10-28 | Stop reason: HOSPADM

## 2023-10-20 RX ORDER — SODIUM CHLORIDE FOR INHALATION 3 %
4 VIAL, NEBULIZER (ML) INHALATION 2 TIMES DAILY
Status: DISCONTINUED | OUTPATIENT
Start: 2023-10-20 | End: 2023-10-28 | Stop reason: HOSPADM

## 2023-10-20 RX ORDER — POLYETHYLENE GLYCOL 3350 17 G/17G
17 POWDER, FOR SOLUTION ORAL DAILY
Status: DISCONTINUED | OUTPATIENT
Start: 2023-10-20 | End: 2023-10-28 | Stop reason: HOSPADM

## 2023-10-20 RX ORDER — SODIUM CHLORIDE 0.9 % (FLUSH) 0.9 %
10 SYRINGE (ML) INJECTION
Status: DISCONTINUED | OUTPATIENT
Start: 2023-10-20 | End: 2023-10-28 | Stop reason: HOSPADM

## 2023-10-20 RX ORDER — ALBUTEROL SULFATE 2.5 MG/.5ML
5 SOLUTION RESPIRATORY (INHALATION)
Status: COMPLETED | OUTPATIENT
Start: 2023-10-20 | End: 2023-10-20

## 2023-10-20 RX ORDER — PREDNISONE 20 MG/1
40 TABLET ORAL DAILY
Status: COMPLETED | OUTPATIENT
Start: 2023-10-21 | End: 2023-10-24

## 2023-10-20 RX ORDER — MUPIROCIN 20 MG/G
OINTMENT TOPICAL 2 TIMES DAILY
Status: COMPLETED | OUTPATIENT
Start: 2023-10-20 | End: 2023-10-25

## 2023-10-20 RX ORDER — FOLIC ACID 1 MG/1
1 TABLET ORAL DAILY
Status: ON HOLD | COMMUNITY
End: 2024-01-24

## 2023-10-20 RX ORDER — FUROSEMIDE 10 MG/ML
40 INJECTION INTRAMUSCULAR; INTRAVENOUS ONCE
Status: DISCONTINUED | OUTPATIENT
Start: 2023-10-20 | End: 2023-10-20

## 2023-10-20 RX ORDER — FOLIC ACID 1 MG/1
1 TABLET ORAL DAILY
Status: DISCONTINUED | OUTPATIENT
Start: 2023-10-21 | End: 2023-10-28 | Stop reason: HOSPADM

## 2023-10-20 RX ORDER — SODIUM CHLORIDE 9 MG/ML
INJECTION, SOLUTION INTRAVENOUS CONTINUOUS
Status: DISCONTINUED | OUTPATIENT
Start: 2023-10-20 | End: 2023-10-28 | Stop reason: HOSPADM

## 2023-10-20 RX ORDER — BISACODYL 10 MG
10 SUPPOSITORY, RECTAL RECTAL DAILY PRN
Status: DISCONTINUED | OUTPATIENT
Start: 2023-10-20 | End: 2023-10-28 | Stop reason: HOSPADM

## 2023-10-20 RX ORDER — FERROUS SULFATE 325(65) MG
325 TABLET, DELAYED RELEASE (ENTERIC COATED) ORAL
Status: ON HOLD | COMMUNITY
End: 2024-01-24

## 2023-10-20 RX ORDER — CLOPIDOGREL BISULFATE 75 MG/1
75 TABLET ORAL DAILY
Status: DISCONTINUED | OUTPATIENT
Start: 2023-10-20 | End: 2023-10-20

## 2023-10-20 RX ORDER — PREDNISONE 20 MG/1
40 TABLET ORAL DAILY
Status: DISCONTINUED | OUTPATIENT
Start: 2023-10-20 | End: 2023-10-20

## 2023-10-20 RX ORDER — IPRATROPIUM BROMIDE AND ALBUTEROL SULFATE 2.5; .5 MG/3ML; MG/3ML
3 SOLUTION RESPIRATORY (INHALATION)
Status: COMPLETED | OUTPATIENT
Start: 2023-10-20 | End: 2023-10-20

## 2023-10-20 RX ORDER — GLYCOPYRROLATE 1 MG/1
1 TABLET ORAL 3 TIMES DAILY
Status: DISCONTINUED | OUTPATIENT
Start: 2023-10-20 | End: 2023-10-28 | Stop reason: HOSPADM

## 2023-10-20 RX ORDER — DOXYCYCLINE HYCLATE 100 MG
100 TABLET ORAL EVERY 12 HOURS
Status: COMPLETED | OUTPATIENT
Start: 2023-10-20 | End: 2023-10-25

## 2023-10-20 RX ORDER — GLYCOPYRROLATE 1 MG/1
1 TABLET ORAL 3 TIMES DAILY
Status: DISCONTINUED | OUTPATIENT
Start: 2023-10-20 | End: 2023-10-20

## 2023-10-20 RX ORDER — IPRATROPIUM BROMIDE AND ALBUTEROL SULFATE 2.5; .5 MG/3ML; MG/3ML
3 SOLUTION RESPIRATORY (INHALATION)
Status: DISCONTINUED | OUTPATIENT
Start: 2023-10-20 | End: 2023-10-21

## 2023-10-20 RX ORDER — METOPROLOL TARTRATE 25 MG/1
12.5 TABLET ORAL 2 TIMES DAILY
Status: DISCONTINUED | OUTPATIENT
Start: 2023-10-20 | End: 2023-10-28 | Stop reason: HOSPADM

## 2023-10-20 RX ORDER — FOLIC ACID 1 MG/1
1 TABLET ORAL DAILY
Status: DISCONTINUED | OUTPATIENT
Start: 2023-10-20 | End: 2023-10-20

## 2023-10-20 RX ORDER — THIAMINE HCL 50 MG
50 TABLET ORAL DAILY
Status: ON HOLD | COMMUNITY
End: 2024-01-24

## 2023-10-20 RX ORDER — SODIUM CHLORIDE 0.9 % (FLUSH) 0.9 %
3 SYRINGE (ML) INJECTION
Status: DISCONTINUED | OUTPATIENT
Start: 2023-10-20 | End: 2023-10-28 | Stop reason: HOSPADM

## 2023-10-20 RX ORDER — FLUOXETINE HYDROCHLORIDE 20 MG/1
20 CAPSULE ORAL DAILY
Status: DISCONTINUED | OUTPATIENT
Start: 2023-10-21 | End: 2023-10-28 | Stop reason: HOSPADM

## 2023-10-20 RX ORDER — FUROSEMIDE 10 MG/ML
20 INJECTION INTRAMUSCULAR; INTRAVENOUS 2 TIMES DAILY
Status: DISCONTINUED | OUTPATIENT
Start: 2023-10-20 | End: 2023-10-22

## 2023-10-20 RX ORDER — FLUOXETINE HYDROCHLORIDE 20 MG/1
20 CAPSULE ORAL DAILY
Status: DISCONTINUED | OUTPATIENT
Start: 2023-10-20 | End: 2023-10-20

## 2023-10-20 RX ORDER — DEXAMETHASONE SODIUM PHOSPHATE 4 MG/ML
10 INJECTION, SOLUTION INTRA-ARTICULAR; INTRALESIONAL; INTRAMUSCULAR; INTRAVENOUS; SOFT TISSUE
Status: COMPLETED | OUTPATIENT
Start: 2023-10-20 | End: 2023-10-20

## 2023-10-20 RX ADMIN — DEXAMETHASONE SODIUM PHOSPHATE 10 MG: 4 INJECTION INTRA-ARTICULAR; INTRALESIONAL; INTRAMUSCULAR; INTRAVENOUS; SOFT TISSUE at 08:10

## 2023-10-20 RX ADMIN — IPRATROPIUM BROMIDE AND ALBUTEROL SULFATE 3 ML: 2.5; .5 SOLUTION RESPIRATORY (INHALATION) at 07:10

## 2023-10-20 RX ADMIN — FUROSEMIDE 20 MG: 10 INJECTION, SOLUTION INTRAMUSCULAR; INTRAVENOUS at 09:10

## 2023-10-20 RX ADMIN — ATORVASTATIN CALCIUM 40 MG: 40 TABLET, FILM COATED ORAL at 09:10

## 2023-10-20 RX ADMIN — DOXYCYCLINE HYCLATE 100 MG: 100 TABLET, COATED ORAL at 09:10

## 2023-10-20 RX ADMIN — SODIUM CHLORIDE: 9 INJECTION, SOLUTION INTRAVENOUS at 05:10

## 2023-10-20 RX ADMIN — IPRATROPIUM BROMIDE AND ALBUTEROL SULFATE 3 ML: 2.5; .5 SOLUTION RESPIRATORY (INHALATION) at 02:10

## 2023-10-20 RX ADMIN — SODIUM CHLORIDE SOLN NEBU 3% 4 ML: 3 NEBU SOLN at 07:10

## 2023-10-20 RX ADMIN — GLYCOPYRROLATE 1 MG: 1 TABLET ORAL at 10:10

## 2023-10-20 RX ADMIN — IPRATROPIUM BROMIDE AND ALBUTEROL SULFATE 3 ML: 2.5; .5 SOLUTION RESPIRATORY (INHALATION) at 08:10

## 2023-10-20 RX ADMIN — PIPERACILLIN AND TAZOBACTAM 4.5 G: 4; .5 INJECTION, POWDER, LYOPHILIZED, FOR SOLUTION INTRAVENOUS; PARENTERAL at 05:10

## 2023-10-20 RX ADMIN — MUPIROCIN: 20 OINTMENT TOPICAL at 09:10

## 2023-10-20 RX ADMIN — SODIUM CHLORIDE SOLN NEBU 3% 4 ML: 3 NEBU SOLN at 02:10

## 2023-10-20 RX ADMIN — METOPROLOL TARTRATE 12.5 MG: 25 TABLET, FILM COATED ORAL at 09:10

## 2023-10-20 RX ADMIN — ALBUTEROL SULFATE 5 MG: 2.5 SOLUTION RESPIRATORY (INHALATION) at 10:10

## 2023-10-20 NOTE — NURSING
[facility] Intensive Care  ICU Shift Summary  Date: 10/20/2023      Prehospitalization: Home  Admit Date / LOS : 10/20/2023/ 0 days    Diagnosis: Acute hypoxemic respiratory failure    Consults:        Active: Palliative and Pulm CC       Needed: N/A     Code Status: Full Code   Advanced Directive: Not Received    LDA:  Lines/Drains/Airways       Drain  Duration                  Gastrostomy/Enterostomy 01/04/22 Percutaneous endoscopic gastrostomy (PEG)  days              Airway  Duration             Adult Surgical Airway 03/16/23 1014 Shiley Cuffed 6.0 218 days              Peripheral Intravenous Line  Duration                  Peripheral IV - Single Lumen 10/20/23 0817 20 G Anterior;Left Forearm <1 day         Peripheral IV - Single Lumen 10/20/23 1557 20 G Anterior;Right Forearm <1 day                  Central Lines/Site/Justification:Patient Does Not Have Central Line  Urinary Cath/Order/Justification:Patient Does Not Have Urinary Catheter    Vasopressors/Infusions:    sodium chloride 0.9% Stopped (10/20/23 1754)          GOALS: Volume/ Hemodynamic: N/A                     RASS: N/A    Pain Management: none       Pain Controlled: yes     Rhythm: ST    Respiratory Device: Vapotherm    Oxygen Concentration (%):  [35-70] 35                 Most Recent SBT/ SAT: N/A       MOVE Screen: PASS  ICU Liberation: not applicable    VTE Prophylaxis: Mechanical  Mobility: Bedrest  Stress Ulcer Prophylaxis: Yes    Isolation: No active isolations    Dietary:   Current Diet Order   Procedures    Diet NPO      Tolerance: yes  Advancement: @ goal    I & O (24h):    Intake/Output Summary (Last 24 hours) at 10/20/2023 1828  Last data filed at 10/20/2023 1800  Gross per 24 hour   Intake 2.33 ml   Output 100 ml   Net -97.67 ml        Restraints: No    Significant Dates:  Post Op Date: N/A  Rescue Date: N/A  Imaging/ Diagnostics: N/A    Noteworthy Labs:  none    COVID Test: (--)  CBC/Anemia Labs: Coags:    Recent Labs   Lab  "10/20/23  0815   WBC 8.82   HGB 10.9*   HCT 35.9*      MCV 90   RDW 14.7*    No results for input(s): "PT", "INR", "APTT" in the last 168 hours.     Chemistries:   Recent Labs   Lab 10/20/23  0815      K 4.6   CL 96   CO2 30*   BUN 23   CREATININE 0.8   CALCIUM 9.4   PROT 7.2   BILITOT 0.4   ALKPHOS 100   ALT 21   AST 18        Cardiac Enzymes: Ejection Fractions:    Recent Labs     10/20/23  0815   TROPONINI 0.051*    EF   Date Value Ref Range Status   03/20/2023 60 % Final        POCT Glucose: HbA1c:    No results for input(s): "POCTGLUCOSE" in the last 168 hours. Hemoglobin A1C   Date Value Ref Range Status   08/29/2023 5.8 (H) 4.0 - 5.6 % Final     Comment:     ADA Screening Guidelines:  5.7-6.4%  Consistent with prediabetes  >or=6.5%  Consistent with diabetes    High levels of fetal hemoglobin interfere with the HbA1C  assay. Heterozygous hemoglobin variants (HbS, HgC, etc)do  not significantly interfere with this assay.   However, presence of multiple variants may affect accuracy.             ICU LOS 2h  Level of Care: Critical Care    Chart Check: 12 HR Done  Shift Summary/Plan for the shift: Admit to ICU on vapotherm and trach collar.  IV abx given per orders.  Trach care per protocol.  Voids via urinal.  POC reviewed with pt and spouse. Expressed understanding.     "

## 2023-10-20 NOTE — HPI
73-year-old male with history of COPD, chronic diastolic failure, HTN and SCC of the tongue s/p glossectomy and flap w/tracheostomy presenting with shortness of breath for 1 week prior that acutely worsened on the morning of admission.  No recent respiratory complaints or sick contacts.  No upper respiratory symptoms or sore throat.  No fevers or chills either.

## 2023-10-20 NOTE — PLAN OF CARE
Admit to ICU on vapotherm and trach collar.  IV abx given per orders.  Trach care per protocol.  Voids via urinal.  POC reviewed with pt and spouse. Expressed understanding.   Problem: Adult Inpatient Plan of Care  Goal: Plan of Care Review  Outcome: Ongoing, Progressing  Goal: Patient-Specific Goal (Individualized)  Outcome: Ongoing, Progressing  Goal: Absence of Hospital-Acquired Illness or Injury  Outcome: Ongoing, Progressing  Goal: Optimal Comfort and Wellbeing  Outcome: Ongoing, Progressing  Goal: Readiness for Transition of Care  Outcome: Ongoing, Progressing     Problem: Adjustment to Illness COPD (Chronic Obstructive Pulmonary Disease)  Goal: Optimal Chronic Illness Coping  Outcome: Ongoing, Progressing     Problem: Functional Ability Impaired COPD (Chronic Obstructive Pulmonary Disease)  Goal: Optimal Level of Functional Gregg  Outcome: Ongoing, Progressing     Problem: Infection COPD (Chronic Obstructive Pulmonary Disease)  Goal: Absence of Infection Signs and Symptoms  Outcome: Ongoing, Progressing     Problem: Oral Intake Inadequate COPD (Chronic Obstructive Pulmonary Disease)  Goal: Improved Nutrition Intake  Outcome: Ongoing, Progressing     Problem: Respiratory Compromise COPD (Chronic Obstructive Pulmonary Disease)  Goal: Effective Oxygenation and Ventilation  Outcome: Ongoing, Progressing     Problem: Coping Ineffective  Goal: Effective Coping  Outcome: Ongoing, Progressing     Problem: Skin Injury Risk Increased  Goal: Skin Health and Integrity  Outcome: Ongoing, Progressing

## 2023-10-20 NOTE — SUBJECTIVE & OBJECTIVE
Past Medical History:   Diagnosis Date    Cancer     skin to Rt forearm and face    COPD (chronic obstructive pulmonary disease)     Hyperlipidemia     Hypertension     Pseudoaneurysm      Past Surgical History:   Procedure Laterality Date    ABDOMINAL SURGERY      stents placed in liver and large intestines, per patient    ANGIOGRAPHY OF AORTIC ARCH  3/27/2023    Procedure: Aortogram, Aortic Arch;  Surgeon: Tim Bhatt MD;  Location: [facility] CATH LAB;  Service: Cardiology;;    AORTOGRAPHY WITH SERIALOGRAPHY N/A 3/27/2023    Procedure: AORTOGRAM, WITH SERIALOGRAPHY;  Surgeon: Tim Bhatt MD;  Location: [facility] CATH LAB;  Service: Cardiology;  Laterality: N/A;    CAROTID STENT      COLONOSCOPY N/A 09/27/2022    Procedure: COLONOSCOPY;  Surgeon: Donnie Peterson MD;  [facility]  Service: Endoscopy;  Laterality: N/A;    COLONOSCOPY N/A 09/30/2022    Procedure: COLONOSCOPY;  Surgeon: Joy Cabrera MD;  [facility]  Service: Endoscopy;  Laterality: N/A;    CORONARY STENT PLACEMENT  01/2000    DISSECTION OF NECK Bilateral 01/04/2022    Procedure: DISSECTION, NECK;  Surgeon: Naeem Smalls MD;  [facility];  Service: ENT;  Laterality: Bilateral;    ESOPHAGOGASTRODUODENOSCOPY N/A 09/27/2022    Procedure: EGD (ESOPHAGOGASTRODUODENOSCOPY);  Surgeon: Donnie Peterson MD;  [facility]  Service: Endoscopy;  Laterality: N/A;    ESOPHAGOGASTRODUODENOSCOPY N/A 09/30/2022    Procedure: EGD (ESOPHAGOGASTRODUODENOSCOPY);  Surgeon: Joy Cabrera MD;  Location: [facility] ENDO (2ND FLR);  Service: Endoscopy;  Laterality: N/A;    ESOPHAGOGASTRODUODENOSCOPY W/ PEG N/A 01/04/2022    Procedure: EGD, WITH PEG TUBE INSERTION;  Surgeon: Anthony Calabrese MD;  [facility];  Service: General;  Laterality: N/A;    EYE SURGERY      Cataract bilateral    femoral stents      bilateral    FLAP PROCEDURE N/A 01/03/2022    Procedure: CREATION, FREE FLAP;   Surgeon: Naeem Smalls MD;  Location: [facility] OR [facility] FLR;  Service: ENT;  Laterality: N/A;    FLAP PROCEDURE Right 01/04/2022    Procedure: CREATION, FREE FLAP;  Surgeon: Stacey Conde MD;  Location: [facility] OR 2ND FLR;  Service: ENT;  Laterality: Right;  Ischemic start 1203  Ischemic stop 1353    GLOSSECTOMY N/A 01/04/2022    Procedure: GLOSSECTOMY;  Surgeon: Naeem Smalls MD;  Location: [facility] OR [facility] FLR;  Service: ENT;  Laterality: N/A;    LEFT HEART CATHETERIZATION Left 3/23/2023    Procedure: Left heart cath;  Surgeon: Tacos Benitez MD;  Location: [facility] CATH LAB;  Service: Cardiology;  Laterality: Left;    PERCUTANEOUS TRANSLUMINAL BALLOON ANGIOPLASTY OF CORONARY ARTERY Left 3/27/2023    Procedure: Angioplasty-coronary;  Surgeon: Tim Bhatt MD;  Location: [facility] CATH LAB;  Service: Cardiology;  Laterality: Left;  not before 9am, R rad access, 6 Fr EBU 3.5 guide    RADICAL NECK DISSECTION Left 01/03/2022    Procedure: DISSECTION, NECK, RADICAL;  Surgeon: Naeem Smalls MD;  [facility];  Service: ENT;  Laterality: Left;    SKIN BIOPSY      SKIN CANCER EXCISION      STENT, CORONARY, MULTI VESSEL  3/27/2023    Procedure: Stent, Coronary, Multi Vessel;  Surgeon: Tim Bhatt MD;  Location: [facility] CATH LAB;  Service: Cardiology;;    TRACHEOTOMY N/A 01/04/2022    Procedure: TRACHEOTOMY;  Surgeon: Naeem Smalls MD;  [facility];  Service: ENT;  Laterality: N/A;    VASCULAR SURGERY       Review of patient's allergies indicates:   Allergen Reactions    Ativan [lorazepam] Anxiety     Medications:  Continuous Infusions:  Scheduled Meds:   albuterol-ipratropium  3 mL Nebulization Q6H WAKE    aspirin  81 mg Oral Daily    atorvastatin  40 mg Per G Tube QHS    clopidogreL  75 mg Oral Daily    doxycycline  100 mg Oral Q12H    FLUoxetine  20 mg Oral Daily    folic acid  1 mg Oral Daily    glycopyrrolate  1 mg Oral TID    metoprolol tartrate  12.5 mg Oral BID     pantoprazole  40 mg Oral Daily    polyethylene glycol  17 g Oral Daily    predniSONE  40 mg Oral Daily    sodium chloride 3%  4 mL Nebulization BID     PRN Meds:bisacodyL, hydrOXYzine HCL, ondansetron, sodium chloride 0.9%, sodium chloride 0.9%    Family History    None       Tobacco Use    Smoking status: Former     Current packs/day: 0.00     Average packs/day: 2.0 packs/day for 55.0 years (110.0 ttl pk-yrs)     Types: Cigarettes     Start date: 1963     Quit date: 2018     Years since quittin.5    Smokeless tobacco: Never    Tobacco comments:     3/3 ppd x 40 yrs. Currently 3-4 cigarettes daily .He is trying  to quit. Is using a Vapor cigarettes  2-3 x's a day.   Substance and Sexual Activity    Alcohol use: Not Currently    Drug use: No    Sexual activity: Not Currently     Review of Systems   Constitutional:  Positive for activity change and fatigue.   Respiratory:  Positive for shortness of breath.         Increased/thick secretions    Gastrointestinal:  Negative for nausea and vomiting.        No difficulties with feeding tube   Neurological:  Positive for weakness.     Objective:     Vital Signs (Most Recent):  Temp: 97.6 °F (36.4 °C) (10/20/23 0750)  Pulse: 104 (10/20/23 1347)  Resp: (!) 27 (10/20/23 1347)  BP: 109/69 (10/20/23 1332)  SpO2: 97 % (10/20/23 1347) Vital Signs (24h Range):  Temp:  [97.6 °F (36.4 °C)] 97.6 °F (36.4 °C)  Pulse:  [103-126] 104  Resp:  [20-27] 27  SpO2:  [88 %-98 %] 97 %  BP: (101-138)/(68-90) 109/69     Weight: 60.8 kg (134 lb)  Body mass index is 20.37 kg/m².     Physical Exam  Vitals and nursing note reviewed.   Constitutional:       General: He is awake.   HENT:      Head: Atraumatic.      Comments: Mild temporal wasting   Pulmonary:      Comments: Mild dyspnea and tachypnea with attempts to talk  Musculoskeletal:      Right lower leg: No edema.      Left lower leg: No edema.   Skin:     Comments: Sores to bridge of nose   Neurological:      Mental Status: He is  alert and oriented to person, place, and time. Mental status is at baseline.   Psychiatric:         Mood and Affect: Mood normal.         Behavior: Behavior normal. Behavior is cooperative.        Advance Care Planning   Advance Directives:   Living Will: No    LaPOST: No    Do Not Resuscitate Status: No    Medical Power of : No    Goals of Care: What is most important right now is to focus on spending time at home, remaining as independent as possible, symptom/pain control, curative/life-prolongation (regardless of treatment burdens). Accordingly, we have decided that the best plan to meet the patient's goals includes continuing with treatment.     Significant Labs: All pertinent labs within the past 24 hours have been reviewed.    CBC:   Recent Labs   Lab 10/20/23  0815   WBC 8.82   HGB 10.9*   HCT 35.9*   MCV 90        BMP:  Recent Labs   Lab 10/20/23  0815   *      K 4.6   CL 96   CO2 30*   BUN 23   CREATININE 0.8   CALCIUM 9.4     LFT:  Lab Results   Component Value Date    AST 18 10/20/2023    ALKPHOS 100 10/20/2023    BILITOT 0.4 10/20/2023     Albumin:   Albumin   Date Value Ref Range Status   10/20/2023 3.0 (L) 3.5 - 5.2 g/dL Final     Protein:   Total Protein   Date Value Ref Range Status   10/20/2023 7.2 6.0 - 8.4 g/dL Final     Lactic acid:   Lab Results   Component Value Date    LACTATE 0.5 04/11/2023    LACTATE 1.8 03/19/2023     Significant Imaging: I have reviewed all pertinent imaging results/findings within the past 24 hours.

## 2023-10-20 NOTE — CONSULTS
[facility]  Critical Care Medicine  Consult Note    Patient Name: Fareed Richard Jr.  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  No recent respiratory complaints or sick contacts.  No upper respiratory symptoms or sore throat.  No fevers or chills either.      Hospital/ICU Course:  No notes on file    Past Medical History:   Diagnosis Date    Cancer     skin to Rt forearm and face    COPD (chronic obstructive pulmonary disease)     Hyperlipidemia     Hypertension     Pseudoaneurysm        Past Surgical History:   Procedure Laterality Date    ABDOMINAL SURGERY      stents placed in liver and large intestines, per patient    ANGIOGRAPHY OF AORTIC ARCH  3/27/2023    Procedure: Aortogram, Aortic Arch;  Surgeon: Tim Bhatt MD;  Location: [facility] CATH LAB;  Service: Cardiology;;    AORTOGRAPHY WITH SERIALOGRAPHY N/A 3/27/2023    Procedure: AORTOGRAM, WITH SERIALOGRAPHY;  Surgeon: Tim Bhatt MD;  Location: [facility] CATH LAB;  Service: Cardiology;  Laterality: N/A;    CAROTID STENT      COLONOSCOPY N/A 09/27/2022    Procedure: COLONOSCOPY;  Surgeon: Donnie Peterson MD;  Location: [address]);  Service: Endoscopy;  Laterality: N/A;    COLONOSCOPY N/A 09/30/2022    Procedure: COLONOSCOPY;  Surgeon: Joy Cabrera MD;  Location: [facility] ENDO ([address] [place] [address]);  Service: Endoscopy;  Laterality: N/A;    CORONARY STENT PLACEMENT  01/2000    DISSECTION OF NECK Bilateral 01/04/2022    Procedure: DISSECTION, NECK;  Surgeon:  Naeem Smalls MD;  [facility];  Service: ENT;  Laterality: Bilateral;    ESOPHAGOGASTRODUODENOSCOPY N/A 09/27/2022    Procedure: EGD (ESOPHAGOGASTRODUODENOSCOPY);  Surgeon: Donnie Peterson MD;  Location: [facility] ENDO (2ND FLR);  Service: Endoscopy;  Laterality: N/A;    ESOPHAGOGASTRODUODENOSCOPY N/A 09/30/2022    Procedure: EGD (ESOPHAGOGASTRODUODENOSCOPY);  Surgeon: Joy Cabrera MD;  Location: [facility] ENDO (2ND FLR);  Service: Endoscopy;  Laterality: N/A;    ESOPHAGOGASTRODUODENOSCOPY W/ PEG N/A 01/04/2022    Procedure: EGD, WITH PEG TUBE INSERTION;  Surgeon: Anthony Calabrese MD;  [facility];  Service: General;  Laterality: N/A;    EYE SURGERY      Cataract bilateral    femoral stents      bilateral    FLAP PROCEDURE N/A 01/03/2022    Procedure: CREATION, FREE FLAP;  Surgeon: Naeem Smalls MD;  [facility];  Service: ENT;  Laterality: N/A;    FLAP PROCEDURE Right 01/04/2022    Procedure: CREATION, FREE FLAP;  Surgeon: Stacey Conde MD;  [address]  Service: E[address];  Laterality: Right;  Ischemic start 1203  Ischemic stop 1353    GLOSSECTOMY N/A 01/04/2022    Procedure: GLOSSECTOMY;  Surgeon: Naeem Smalls MD;  Location: [facility] OR [address] [place] [address];  Service: ENT;  Laterality: N/A;    LEFT HEART CATHETERIZATION Left 3/23/2023    Procedure: Left heart cath;  Surgeon: Tacos Benitez MD;  Location: [facility] CATH LAB;  Service: Cardiology;  Laterality: Left;    PERCUTANEOUS TRANSLUMINAL BALLOON ANGIOPLASTY OF CORONARY ARTERY Left 3/27/2023    Procedure: Angioplasty-coronary;  Surgeon: Tim Bhatt MD;  Location: [facility] CATH LAB;  Service: Cardiology;  Laterality: Left;  not before 9am, R rad access, 6 Fr EBU 3.5 guide    RADICAL NECK DISSECTION Left 01/03/2022    Procedure: DISSECTION, NECK, RADICAL;  Surgeon: Naeem Smalls MD;  [facility];  Service: ENT;  Laterality: Left;    SKIN BIOPSY      SKIN CANCER EXCISION       STENT, CORONARY, MULTI VESSEL  3/27/2023    Procedure: Stent, Coronary, Multi Vessel;  Surgeon: Tim Bhatt MD;  Location: [facility] CATH LAB;  Service: Cardiology;;    TRACHEOTOMY N/A 2022    Procedure: TRACHEOTOMY;  Surgeon: Naeem Smalls MD;  Location: [facility] OR [address] [place] [address];  Service: ENT;  Laterality: N/A;    VASCULAR SURGERY         Review of patient's allergies indicates:   Allergen Reactions    Ativan [lorazepam] Anxiety       Family History    None       Tobacco Use    Smoking status: Former     Current packs/day: 0.00     Average packs/day: 2.0 packs/day for 55.0 years (110.0 ttl pk-yrs)     Types: Cigarettes     Start date: 1963     Quit date: 2018     Years since quittin.5    Smokeless tobacco: Never    Tobacco comments:     3/3 ppd x 40 yrs. Currently 3-4 cigarettes daily .He is trying  to quit. Is using a Vapor cigarettes  2-3 x's a day.   Substance and Sexual Activity    Alcohol use: Not Currently    Drug use: No    Sexual activity: Not Currently       Objective:     Vital Signs (Most Recent):  Temp: 97.6 °F (36.4 °C) (10/20/23 0750)  Pulse: 107 (10/20/23 1452)  Resp: (!) 25 (10/20/23 1452)  BP: 109/69 (10/20/23 1332)  SpO2: (!) 94 % (10/20/23 1452) Vital Signs (24h Range):  Temp:  [97.6 °F (36.4 °C)] 97.6 °F (36.4 °C)  Pulse:  [100-126] 107  Resp:  [20-27] 25  SpO2:  [88 %-100 %] 94 %  BP: (101-138)/(68-90) 109/69     Weight: 60.8 kg (134 lb)  Body mass index is 20.37 kg/m².    No intake or output data in the 24 hours ending 10/20/23 1542     Physical Exam  Vitals and nursing note reviewed.   Constitutional:       General: He is awake.      Appearance: He is underweight. He is ill-appearing (Chronically). He is not toxic-appearing or diaphoretic.   HENT:      Mouth/Throat:      Comments: Shiley uncuffed 6 0 trach  Eyes:      General: No scleral icterus.     Extraocular Movements: Extraocular movements intact.   Cardiovascular:      Rate and Rhythm: Tachycardia present.    Pulmonary:      Effort: No tachypnea, accessory muscle usage, respiratory distress or retractions.   Abdominal:      General: There is no distension.   Musculoskeletal:      Right lower leg: No edema.      Left lower leg: No edema.   Skin:     General: Skin is warm and dry.      Coloration: Skin is not jaundiced.      Findings: No rash.   Neurological:      General: No focal deficit present.      Mental Status: He is alert. Mental status is at baseline.   Psychiatric:         Mood and Affect: Mood is anxious.          Vents:  Oxygen Concentration (%): 35 (10/20/23 1420)    Lines/Drains/Airways       Drain  Duration                  Gastrostomy/Enterostomy 01/04/22 Percutaneous endoscopic gastrostomy (PEG)  days              Airway  Duration             Adult Surgical Airway 03/16/23 1014 Shiley Cuffed 6.0 218 days              Peripheral Intravenous Line  Duration                  Peripheral IV - Single Lumen 10/20/23 0817 20 G Anterior;Left Forearm <1 day                    Significant Labs:    CBC/Anemia Profile:  Recent Labs   Lab 10/20/23  0815   WBC 8.82   HGB 10.9*   HCT 35.9*      MCV 90   RDW 14.7*        Chemistries:  Recent Labs   Lab 10/20/23  0815      K 4.6   CL 96   CO2 30*   BUN 23   CREATININE 0.8   CALCIUM 9.4   ALBUMIN 3.0*   PROT 7.2   BILITOT 0.4   ALKPHOS 100   ALT 21   AST 18       All pertinent labs within the past 24 hours have been reviewed.    Significant Imaging:   I have reviewed all pertinent imaging results/findings within the past 24 hours.      ABG  Recent Labs   Lab 10/20/23  1452   PH 7.438   PO2 55*   PCO2 49.3*   HCO3 33.3*   BE 8*     Assessment/Plan:     ENT  Tracheostomy present  Routine trach care with suctioning PRN.  Hypertonic saline nebs BID PRN.    Pulmonary  * Acute hypoxemic respiratory failure  Afebrile with normal WBC.  CXR with coarsened interstitial and bilateral effusions, but no dense consolidation.  BNP 1000 in the setting of known chronic  diastolic heart failure.  Reasonable to treat for COPD exacerbation.  Agree with empiric doxycycline for cap.  Obtain sputum culture if possible.  Recommend diuresis and maintain strict I/O.  Continue to use Vapotherm in the interim given the amount of secretions coming through the tracheostomy.  If patient continues to develop worsening respiratory distress, can swap out trach for cuffed Shiley in the event that he needs positive pressure ventilation    COPD exacerbation  Smoking history.  No formal PFTs.  Reasonable to treat for COPD exacerbation with 5 days of prednisone and duo nebs q.4 and Q4 PRN.    Cardiac/Vascular  Acute on chronic diastolic heart failure  Most recent echo with EF 60%, biatrial enlargement and changes consistent with diastolic dysfunction.  BNP still 1000 with a CXR showing bilateral effusions.  No gross edema on exam, however.  Recommend aggressive diuresis with strict I/O.    Coronary artery disease involving native coronary artery of native heart with unstable angina pectoris  Underwent multivessel PCI back in March of this year.  Trop minimally elevated.  Does not appear to be acute coronary issue at this time.         Critical Care Time: 45 minutes  Critical secondary to Patient has a condition that poses threat to life and bodily function: Severe Respiratory Distress     Critical care was time spent personally by me on the following activities: development of treatment plan with patient or surrogate and bedside caregivers, discussions with consultants, evaluation of patient's response to treatment, examination of patient, ordering and performing treatments and interventions, ordering and review of laboratory studies, ordering and review of radiographic studies, pulse oximetry, re-evaluation of patient's condition. This critical care time did not overlap with that of any other provider or involve time for any procedures.    Thank you for your consult. I will follow-up with patient.  Please contact us if you have any additional questions.     Diego Agarwal MD  Critical Care Medicine  [facility] - Emergency Dept

## 2023-10-20 NOTE — ED TRIAGE NOTES
Pt complaining of SOB and fatigue for one week. Pt is trach dependent and has a peg tube. Pt baseline oxygen sats are 88-94% and last night sats dropped into 50s. Pt is on 10L via trach collar. RT called for breathing tx. Pt sats are 91%. Placed on cardiac monitor.

## 2023-10-20 NOTE — CONSULTS
[facility] - Emergency Dept  Palliative Medicine  Consult Note    Patient Name: Fareed Richard Jr.  MRN: [id number]  Admission Date: 10/20/2023  Hospital Length of Stay: 0 days  Code Status: Full Code   Attending Provider: Nikita Castillo III, MD  Consulting Provider: Trupti Beck NP  Primary Care Physician: Kodi Tubbs MD  Principal Problem:<principal problem not specified>    Patient information was obtained from patient, spouse/SO, past medical records, ER records and primary team.      Inpatient consult to Palliative Care  Consult performed by: Trupti Beck NP  Consult ordered by: Nikita Castillo III, MD  Reason for consult: goals of care, advanced care planning, and continuity of care      Assessment/Plan:   Advance Care Planning     Palliative Care  ACP (advance care planning)  10/20/2023  - consult received; interval chart reviewed in detail; discussed pt with hospital primary, Dr. Castillo   - pt known to myself and palliative medicine team from previous admission (3/18-4/30)   - met with patient and wife at ED bedside; re-introduction to palliative medicine team and role in current care and admission   - pt and wife, report pt has been doing well at home overall, with no readmission since extended admission with minimal complications up until about a week ago when he started to have increased secretions and feeling worn out   - GOC/ACP discussion; wife shares discussion with pt at home when EMS was in route, about wishes regarding code status once at hospital; pt wished for continued full code, which pt confirms as well; also wishes for full treatment at this time   - home palliative was not coordinated following previous admission; plan for continued discussion of palliative medicine and philosophy of care (pt does have some mistrust and is concerned about starting hospice against his wishes)   - emotional support provided; allowed time for questions/concerns, all addressed;  expressed  "availability of myself/palliative team for additional questions/concerns   - updated primary     Pulmonary  COPD exacerbation  - chronic history noted  - home O2 at baseline via trach   - wife reports doing well since prior admission, up until about a week ago with pt being very short of breath, increased secretions, intermitent hypoxia (in 50s at time decided to call EMS)   - continued management per hospital primary     Oncology  Squamous cell cancer of tongue  - chronic history noted  - pt with trach and PEG   - follows up with oncology as OP     Thank you for your consult. I will follow-up with patient. Please contact us if you have any additional questions.     Total visit time: 86 minutes    70 min visit time including: face to face time in discussion of symptom assessment, and exploring options and burdens of offered treatments.  This also includes non-face to face time preparing to see the patient including chart review, obtaining and/or reviewing separately obtained history, documenting clinical information in the electronic or other health record, independently interpreting results and communicating results to the patient/family/caregiver, family discussions by phone if not able to be present, coordination of care with other specialists, and discharge planning.     16 min ACP time spent: goals of care, advanced care planning, emotional support, formulating and communicating prognosis, exploring burden/ benefit of various approaches of treatment.     Trupti Beck NP  Palliative Medicine  [facility]    Subjective:     HPI:   From H&P: "Fareed Richard Jr. is a 73 y.o. male, with a past medical history of COPD, CHF (EF 60%), HLD, HTN, and cancer, who presents to the ED via EMS with shortness of breath that began 1 week ago and increased at 3 am this morning. Patient received at home breathing treatment with relief. Per independent historian, EMS, patient's SpO2 was 88-91% on " "arrival. Patient's baseline is 88-94%. 82% in exam room. Patient is on home O2 of 6L with tracheostomy collar. Patient also notes cough. Patient denies chest pain, fever, chills, abdominal pain, nausea, vomiting, diarrhea, dysuria, headaches, congestion, sore throat, arm or leg trouble, eye pain, ear pain, rash, or other associated symptoms."     Palliative medicine consulted for goals of care discussion and advance care planning; for details of visit, see advance care planning section of plan.       Hospital Course:  No notes on file    Past Medical History:   Diagnosis Date    Cancer     skin to Rt forearm and face    COPD (chronic obstructive pulmonary disease)     Hyperlipidemia     Hypertension     Pseudoaneurysm      Past Surgical History:   Procedure Laterality Date    ABDOMINAL SURGERY      stents placed in liver and large intestines, per patient    ANGIOGRAPHY OF AORTIC ARCH  3/27/2023    Procedure: Aortogram, Aortic Arch;  Surgeon: Tim Bhatt MD;  Location: [facility] CATH LAB;  Service: Cardiology;;    AORTOGRAPHY WITH SERIALOGRAPHY N/A 3/27/2023    Procedure: AORTOGRAM, WITH SERIALOGRAPHY;  Surgeon: Tim Bhatt MD;  Location: [facility] CATH LAB;  Service: Cardiology;  Laterality: N/A;    CAROTID STENT      COLONOSCOPY N/A 09/27/2022    Procedure: COLONOSCOPY;  Surgeon: Donnie Peterson MD;  Location: [facility] ([address] [place] [address]);  Service: Endoscopy;  Laterality: N/A;    COLONOSCOPY N/A 09/30/2022    Procedure: COLONOSCOPY;  Surgeon: Joy Cabrera MD;  Location: [facility] ([address] [place] [address]);  Service: Endoscopy;  Laterality: N/A;    CORONARY STENT PLACEMENT  01/2000    DISSECTION OF NECK Bilateral 01/04/2022    Procedure: DISSECTION, NECK;  Surgeon: Naeem Smalls MD;  Location: [facility] OR [address] [place] [address];  Service: ENT;  Laterality: Bilateral;    ESOPHAGOGASTRODUODENOSCOPY N/A 09/27/2022    Procedure: EGD (ESOPHAGOGASTRODUODENOSCOPY);  Surgeon: Donnie Peterson MD;  Location: [facility] ([address] [place] [address]);  Service: " Endoscopy;  Laterality: N/A;    ESOPHAGOGASTRODUODENOSCOPY N/A 09/30/2022    Procedure: EGD (ESOPHAGOGASTRODUODENOSCOPY);  Surgeon: Joy Cabrera MD;  Location: [facility] ENDO (2ND FLR);  Service: Endoscopy;  Laterality: N/A;    ESOPHAGOGASTRODUODENOSCOPY W/ PEG N/A 01/04/2022    Procedure: EGD, WITH PEG TUBE INSERTION;  Surgeon: Anthony Calabrese MD;  Location: [facility] OR 2ND FLR;  Service: General;  Laterality: N/A;    EYE SURGERY      Cataract bilateral    femoral stents      bilateral    FLAP PROCEDURE N/A 01/03/2022    Procedure: CREATION, FREE FLAP;  Surgeon: Naeem Smalls MD;  Location: [facility] OR [facility] FLR;  Service: ENT;  Laterality: N/A;    FLAP PROCEDURE Right 01/04/2022    Procedure: CREATION, FREE FLAP;  Surgeon: Stacey Conde MD;  [facility];  Service: ENT;  Laterality: Right;  Ischemic start 1203  Ischemic stop 1353    GLOSSECTOMY N/A 01/04/2022    Procedure: GLOSSECTOMY;  Surgeon: Naeem Smalls MD;  Location: [facility] OR 2ND FLR;  Service: ENT;  Laterality: N/A;    LEFT HEART CATHETERIZATION Left 3/23/2023    Procedure: Left heart cath;  Surgeon: Tacos Benitez MD;  Location: [facility] CATH LAB;  Service: Cardiology;  Laterality: Left;    PERCUTANEOUS TRANSLUMINAL BALLOON ANGIOPLASTY OF CORONARY ARTERY Left 3/27/2023    Procedure: Angioplasty-coronary;  Surgeon: Tim Bhatt MD;  Location: [facility] CATH LAB;  Service: Cardiology;  Laterality: Left;  not before 9am, R rad access, 6 Fr EBU 3.5 guide    RADICAL NECK DISSECTION Left 01/03/2022    Procedure: DISSECTION, NECK, RADICAL;  Surgeon: Naeem Smalls MD;  [facility];  Service: ENT;  Laterality: Left;    SKIN BIOPSY      SKIN CANCER EXCISION      STENT, CORONARY, MULTI VESSEL  3/27/2023    Procedure: Stent, Coronary, Multi Vessel;  Surgeon: Tim Bhatt MD;  Location: [facility] CATH LAB;  Service: Cardiology;;    TRACHEOTOMY N/A 01/04/2022    Procedure: TRACHEOTOMY;  Surgeon: Naeem MONTIEL  MD Slim;  Location: [facility] OR [address] [place] [address];  Service: ENT;  Laterality: N/A;    VASCULAR SURGERY       Review of patient's allergies indicates:   Allergen Reactions    Ativan [lorazepam] Anxiety     Medications:  Continuous Infusions:  Scheduled Meds:   albuterol-ipratropium  3 mL Nebulization Q6H WAKE    aspirin  81 mg Oral Daily    atorvastatin  40 mg Per G Tube QHS    clopidogreL  75 mg Oral Daily    doxycycline  100 mg Oral Q12H    FLUoxetine  20 mg Oral Daily    folic acid  1 mg Oral Daily    glycopyrrolate  1 mg Oral TID    metoprolol tartrate  12.5 mg Oral BID    pantoprazole  40 mg Oral Daily    polyethylene glycol  17 g Oral Daily    predniSONE  40 mg Oral Daily    sodium chloride 3%  4 mL Nebulization BID     PRN Meds:bisacodyL, hydrOXYzine HCL, ondansetron, sodium chloride 0.9%, sodium chloride 0.9%    Family History    None       Tobacco Use    Smoking status: Former     Current packs/day: 0.00     Average packs/day: 2.0 packs/day for 55.0 years (110.0 ttl pk-yrs)     Types: Cigarettes     Start date: 1963     Quit date: 2018     Years since quittin.5    Smokeless tobacco: Never    Tobacco comments:     3/3 ppd x 40 yrs. Currently 3-4 cigarettes daily .He is trying  to quit. Is using a Vapor cigarettes  2-3 x's a day.   Substance and Sexual Activity    Alcohol use: Not Currently    Drug use: No    Sexual activity: Not Currently     Review of Systems   Constitutional:  Positive for activity change and fatigue.   Respiratory:  Positive for shortness of breath.         Increased/thick secretions    Gastrointestinal:  Negative for nausea and vomiting.        No difficulties with feeding tube   Neurological:  Positive for weakness.     Objective:     Vital Signs (Most Recent):  Temp: 97.6 °F (36.4 °C) (10/20/23 0750)  Pulse: 104 (10/20/23 1347)  Resp: (!) 27 (10/20/23 1347)  BP: 109/69 (10/20/23 1332)  SpO2: 97 % (10/20/23 1347) Vital Signs (24h Range):  Temp:  [97.6 °F (36.4  °C)] 97.6 °F (36.4 °C)  Pulse:  [103-126] 104  Resp:  [20-27] 27  SpO2:  [88 %-98 %] 97 %  BP: (101-138)/(68-90) 109/69     Weight: 60.8 kg (134 lb)  Body mass index is 20.37 kg/m².     Physical Exam  Vitals and nursing note reviewed.   Constitutional:       General: He is awake.   HENT:      Head: Atraumatic.      Comments: Mild temporal wasting   Pulmonary:      Comments: Mild dyspnea and tachypnea with attempts to talk  Musculoskeletal:      Right lower leg: No edema.      Left lower leg: No edema.   Skin:     Comments: Sores to bridge of nose   Neurological:      Mental Status: He is alert and oriented to person, place, and time. Mental status is at baseline.   Psychiatric:         Mood and Affect: Mood normal.         Behavior: Behavior normal. Behavior is cooperative.        Advance Care Planning  Advance Directives:   Living Will: No    [place]: No    Do Not Resuscitate Status: No    Medical Power of : No    Goals of Care: What is most important right now is to focus on spending time at home, remaining as independent as possible, symptom/pain control, curative/life-prolongation (regardless of treatment burdens). Accordingly, we have decided that the best plan to meet the patient's goals includes continuing with treatment.     Significant Labs: All pertinent labs within the past 24 hours have been reviewed.    CBC:   Recent Labs   Lab 10/20/23  0815   WBC 8.82   HGB 10.9*   HCT 35.9*   MCV 90        BMP:  Recent Labs   Lab 10/20/23  0815   *      K 4.6   CL 96   CO2 30*   BUN 23   CREATININE 0.8   CALCIUM 9.4     LFT:  Lab Results   Component Value Date    AST 18 10/20/2023    ALKPHOS 100 10/20/2023    BILITOT 0.4 10/20/2023     Albumin:   Albumin   Date Value Ref Range Status   10/20/2023 3.0 (L) 3.5 - 5.2 g/dL Final     Protein:   Total Protein   Date Value Ref Range Status   10/20/2023 7.2 6.0 - 8.4 g/dL Final     Lactic acid:   Lab Results   Component Value Date    LACTATE 0.5  04/11/2023    LACTATE 1.8 03/19/2023     Significant Imaging: I have reviewed all pertinent imaging results/findings within the past 24 hours.      I spent a total of 86 minutes on the day of the visit. This includes face to face time in discussion of goals of care, symptom assessment, coordination of care and emotional support.  This also includes non-face to face time preparing to see the patient (eg, review of tests/imaging), obtaining and/or reviewing separately obtained history, documenting clinical information in the electronic or other health record, independently interpreting results and communicating results to the patient/family/caregiver, or care coordinator.    Trupti Beck NP  Palliative Medicine  [facility] - Emergency Dept

## 2023-10-20 NOTE — HPI
"From H&P: "Fareed Richard Jr. is a 73 y.o. male, with a past medical history of COPD, CHF (EF 60%), HLD, HTN, and cancer, who presents to the ED via EMS with shortness of breath that began 1 week ago and increased at 3 am this morning. Patient received at home breathing treatment with relief. Per independent historian, EMS, patient's SpO2 was 88-91% on arrival. Patient's baseline is 88-94%. 82% in exam room. Patient is on home O2 of 6L with tracheostomy collar. Patient also notes cough. Patient denies chest pain, fever, chills, abdominal pain, nausea, vomiting, diarrhea, dysuria, headaches, congestion, sore throat, arm or leg trouble, eye pain, ear pain, rash, or other associated symptoms."     Palliative medicine consulted for goals of care discussion and advance care planning; for details of visit, see advance care planning section of plan.   "

## 2023-10-20 NOTE — ASSESSMENT & PLAN NOTE
Smoking history.  No formal PFTs.  Reasonable to treat for COPD exacerbation with 5 days of prednisone and duo nebs q.4 and Q4 PRN.

## 2023-10-20 NOTE — ED NOTES
Pt requesting to be suctioned. RN suctions pt trach using sterile technique. RT called to bedside to place pt on vapotherm. Pt sats 83% with increased WOB noted.

## 2023-10-20 NOTE — SUBJECTIVE & OBJECTIVE
Past Medical History:   Diagnosis Date    Cancer     skin to Rt forearm and face    COPD (chronic obstructive pulmonary disease)     Hyperlipidemia     Hypertension     Pseudoaneurysm        Past Surgical History:   Procedure Laterality Date    ABDOMINAL SURGERY      stents placed in liver and large intestines, per patient    ANGIOGRAPHY OF AORTIC ARCH  3/27/2023    Procedure: Aortogram, Aortic Arch;  Surgeon: Tim Bhatt MD;  Location: [facility] CATH LAB;  Service: Cardiology;;    AORTOGRAPHY WITH SERIALOGRAPHY N/A 3/27/2023    Procedure: AORTOGRAM, WITH SERIALOGRAPHY;  Surgeon: Tim Bhatt MD;  Location: [facility] CATH LAB;  Service: Cardiology;  Laterality: N/A;    CAROTID STENT      COLONOSCOPY N/A 09/27/2022    Procedure: COLONOSCOPY;  Surgeon: Donnie Peterson MD;  [facility]  Service: Endoscopy;  Laterality: N/A;    COLONOSCOPY N/A 09/30/2022    Procedure: COLONOSCOPY;  Surgeon: Joy Cabrera MD;  [facility]  Service: Endoscopy;  Laterality: N/A;    CORONARY STENT PLACEMENT  01/2000    DISSECTION OF NECK Bilateral 01/04/2022    Procedure: DISSECTION, NECK;  Surgeon: Naeem Smalls MD;  [facility];  Service: ENT;  Laterality: Bilateral;    ESOPHAGOGASTRODUODENOSCOPY N/A 09/27/2022    Procedure: EGD (ESOPHAGOGASTRODUODENOSCOPY);  Surgeon: Donnie Peterson MD;  [facility]  Service: Endoscopy;  Laterality: N/A;    ESOPHAGOGASTRODUODENOSCOPY N/A 09/30/2022    Procedure: EGD (ESOPHAGOGASTRODUODENOSCOPY);  Surgeon: Joy Cabrera MD;  Location: [facility] ENDO (2ND FLR);  Service: Endoscopy;  Laterality: N/A;    ESOPHAGOGASTRODUODENOSCOPY W/ PEG N/A 01/04/2022    Procedure: EGD, WITH PEG TUBE INSERTION;  Surgeon: Anthony Calabrese MD;  [facility];  Service: General;  Laterality: N/A;    EYE SURGERY      Cataract bilateral    femoral stents      bilateral    FLAP PROCEDURE N/A 01/03/2022    Procedure: CREATION, FREE FLAP;   Surgeon: Naeem Smalls MD;  [facility];  Service: ENT;  Laterality: N/A;    FLAP PROCEDURE Right 01/04/2022    Procedure: CREATION, FREE FLAP;  Surgeon: Stacey Conde MD;  [facility];  Service: ENT;  Laterality: Right;  Ischemic start 1203  Ischemic stop 1353    GLOSSECTOMY N/A 01/04/2022    Procedure: GLOSSECTOMY;  Surgeon: Naeem Smalls MD;  [facility];  Service: ENT;  Laterality: N/A;    LEFT HEART CATHETERIZATION Left 3/23/2023    Procedure: Left heart cath;  Surgeon: Tacos Benitez MD;  Location: [facility] CATH LAB;  Service: Cardiology;  Laterality: Left;    PERCUTANEOUS TRANSLUMINAL BALLOON ANGIOPLASTY OF CORONARY ARTERY Left 3/27/2023    Procedure: Angioplasty-coronary;  Surgeon: Tim Bhatt MD;  Location: [facility] CATH LAB;  Service: Cardiology;  Laterality: Left;  not before 9am, R rad access, 6 Fr EBU 3.5 guide    RADICAL NECK DISSECTION Left 01/03/2022    Procedure: DISSECTION, NECK, RADICAL;  Surgeon: Naeem Smalls MD;  Location: [facility] OR [address] [place] [address];  Service: ENT;  Laterality: Left;    SKIN BIOPSY      SKIN CANCER EXCISION      STENT, CORONARY, MULTI VESSEL  3/27/2023    Procedure: Stent, Coronary, Multi Vessel;  Surgeon: Tim Bhatt MD;  Location: [facility] CATH LAB;  Service: Cardiology;;    TRACHEOTOMY N/A 01/04/2022    Procedure: TRACHEOTOMY;  Surgeon: Naeem Smalls MD;  [facility];  Service: ENT;  Laterality: N/A;    VASCULAR SURGERY         Review of patient's allergies indicates:   Allergen Reactions    Ativan [lorazepam] Anxiety       No current facility-administered medications on file prior to encounter.     Current Outpatient Medications on File Prior to Encounter   Medication Sig    albuterol-ipratropium (DUO-NEB) 2.5 mg-0.5 mg/3 mL nebulizer solution Take 3 mLs by nebulization every 4 (four) hours as needed for Wheezing. Rescue    ascorbic acid, vitamin C, (VITAMIN C) 100 MG tablet Take 100 mg by mouth  once daily.    aspirin 81 MG Chew Take 1 tablet (81 mg total) by mouth once daily.    atorvastatin (LIPITOR) 40 MG tablet 1 tablet (40 mg total) by Per G Tube route every evening. (Patient taking differently: 40 mg by Per G Tube route once daily.)    bisacodyL (DULCOLAX) 10 mg Supp Place 1 suppository (10 mg total) rectally daily as needed.    clopidogreL (PLAVIX) 75 mg tablet Take 1 tablet (75 mg total) by mouth once daily. (Patient taking differently: Take 75 mg by mouth nightly.)    ferrous sulfate 325 (65 FE) MG EC tablet Take 325 mg by mouth 3 (three) times daily with meals.    FLUoxetine (PROZAC) 20 mg/5 mL (4 mg/mL) solution Take 20 mg by mouth once daily.    folic acid (FOLVITE) 1 MG tablet Take 1 mg by mouth once daily.    furosemide (LASIX) 40 MG tablet Take 0.5 tablets (20 mg total) by mouth once daily. (Patient taking differently: Take 20 mg by mouth nightly.)    glycopyrrolate (CUVPOSA) 1 mg/5 mL (0.2 mg/mL) Soln Take 5 mLs (1 mg total) by mouth 3 (three) times daily as needed (TO REDUCE SECRETIONS).    hydrOXYzine HCL (ATARAX) 25 MG tablet SMARTSI.5 Tablet(s) Gastro Tube Every 8 Hours PRN    melatonin (MELATIN) 3 mg tablet 2 tablets (6 mg total) by Per G Tube route nightly.    metoprolol tartrate (LOPRESSOR) 25 MG tablet Take 0.5 tablets (12.5 mg total) by mouth 2 (two) times daily.    omeprazole (PRILOSEC) 40 MG capsule Take 40 mg (contents of one capsule) twice daily through PEG tube. Mix contents of capsule with 10 mL applesauce. (Patient taking differently: 40 mg nightly. Take 40 mg (contents of one capsule) twice daily through PEG tube. Mix contents of capsule with 10 mL applesauce.)    thiamine (VITAMIN B-1) 50 MG tablet Take 50 mg by mouth once daily.    WIXELA INHUB 250-50 mcg/dose diskus inhaler SMARTSI Puff(s) By Mouth Every 12 Hours    polyethylene glycol (GLYCOLAX) 17 gram PwPk 17 g by Per G Tube route 2 (two) times daily.    sodium chloride 3% 3 % nebulizer solution Take 4 mLs by  nebulization 2 (two) times a day.    [DISCONTINUED] guaiFENesin 200 mg/5 mL Liqd Take 400 mg by mouth every 4 (four) hours as needed (for secretions).    [DISCONTINUED] losartan (COZAAR) 50 MG tablet 1 tablet (50 mg total) by Per G Tube route once daily.     Family History    None       Tobacco Use    Smoking status: Former     Current packs/day: 0.00     Average packs/day: 2.0 packs/day for 55.0 years (110.0 ttl pk-yrs)     Types: Cigarettes     Start date: 1963     Quit date: 2018     Years since quittin.5    Smokeless tobacco: Never    Tobacco comments:     3/3 ppd x 40 yrs. Currently 3-4 cigarettes daily .He is trying  to quit. Is using a Vapor cigarettes  2-3 x's a day.   Substance and Sexual Activity    Alcohol use: Not Currently    Drug use: No    Sexual activity: Not Currently     Review of Systems  Objective:     Vital Signs (Most Recent):  Temp: 97.6 °F (36.4 °C) (10/20/23 0750)  Pulse: 107 (10/20/23 1452)  Resp: (!) 25 (10/20/23 1452)  BP: 109/69 (10/20/23 1332)  SpO2: (!) 94 % (10/20/23 1452) Vital Signs (24h Range):  Temp:  [97.6 °F (36.4 °C)] 97.6 °F (36.4 °C)  Pulse:  [100-126] 107  Resp:  [20-27] 25  SpO2:  [88 %-100 %] 94 %  BP: (101-138)/(68-90) 109/69     Weight: 60.8 kg (134 lb)  Body mass index is 20.37 kg/m².     Physical Exam  Vitals reviewed.   Constitutional:       General: He is not in acute distress.     Appearance: He is ill-appearing and toxic-appearing.   HENT:      Head: Normocephalic and atraumatic.      Mouth/Throat:      Mouth: Mucous membranes are dry.      Pharynx: Oropharynx is clear.   Neck:      Comments: Trach in place with trach collar for oxygenation  Cardiovascular:      Rate and Rhythm: Normal rate and regular rhythm.   Pulmonary:      Effort: Pulmonary effort is normal. No respiratory distress.      Comments: Upper airway crackling noted.  Abdominal:      Palpations: Abdomen is soft.   Musculoskeletal:         General: No swelling or tenderness.       Cervical back: Neck supple. No rigidity.   Skin:     General: Skin is warm and dry.   Neurological:      General: No focal deficit present.      Mental Status: He is alert and oriented to person, place, and time.   Psychiatric:         Mood and Affect: Mood normal.         Behavior: Behavior normal.                Significant Labs: All pertinent labs within the past 24 hours have been reviewed.    Significant Imaging: I have reviewed all pertinent imaging results/findings within the past 24 hours.

## 2023-10-20 NOTE — ASSESSMENT & PLAN NOTE
Afebrile with normal WBC.  CXR with coarsened interstitial and bilateral effusions, but no dense consolidation.  BNP 1000 in the setting of known chronic diastolic heart failure.  Reasonable to treat for COPD exacerbation.  Agree with empiric doxycycline for cap.  Obtain sputum culture if possible.  Recommend diuresis and maintain strict I/O.  Continue to use Vapotherm in the interim given the amount of secretions coming through the tracheostomy.  If patient continues to develop worsening respiratory distress, can swap out trach for cuffed Shiley in the event that he needs positive pressure ventilation

## 2023-10-20 NOTE — ASSESSMENT & PLAN NOTE
Advance Care Planning     Date: 10/20/2023  Pt wants to be full code at this time. Has home palliative. Palliative care following. 3 minutes spent in acp conversations

## 2023-10-20 NOTE — HPI
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 Per independent historian, EMS, patient's SpO2 was 88-91% on arrival. Patient's baseline is 88-94%. 82% in exam room. Patient is on home O2 of 6L with tracheostomy collar, but oxygen requirement continued to increase in the ed up to needing vapotherm and trach collar for adequate oxygenation.

## 2023-10-20 NOTE — ASSESSMENT & PLAN NOTE
- chronic history noted  - home O2 at baseline via trach   - wife reports doing well since prior admission, up until about a week ago with pt being very short of breath, increased secretions, intermitent hypoxia (in 50s at time decided to call EMS)   - continued management per hospital primary

## 2023-10-20 NOTE — ASSESSMENT & PLAN NOTE
10/20/2023  - consult received; interval chart reviewed in detail; discussed pt with hospital primary, Dr. Castillo   - pt known to myself and palliative medicine team from previous admission (3/18-4/30)   - met with patient and wife at ED bedside; re-introduction to palliative medicine team and role in current care and admission   - pt and wife, report pt has been doing well at home overall, with no readmission since extended admission with minimal complications up until about a week ago when he started to have increased secretions and feeling worn out   - GOC/ACP discussion; wife shares discussion with pt at home when EMS was in route, about wishes regarding code status once at hospital; pt wished for continued full code, which pt confirms as well; also wishes for full treatment at this time   - home palliative was not coordinated following previous admission; plan for continued discussion of palliative medicine and philosophy of care (pt does have some mistrust and is concerned about starting hospice against his wishes)   - emotional support provided; allowed time for questions/concerns, all addressed   - updated primary     - emotional support provided   - Allowed time for questions/concerns; all addressed; expressed availability of myself/palliative team for additional questions/concerns

## 2023-10-20 NOTE — ASSESSMENT & PLAN NOTE
Underwent multivessel PCI back in March of this year.  Trop minimally elevated.  Does not appear to be acute coronary issue at this time.

## 2023-10-20 NOTE — PLAN OF CARE
[facility]  Initial Discharge Assessment       Primary Care Provider: Kodi Tubbs MD  Case Management Assessment     PCP: See above  Ph[facility]: [facility] [address]    Patient Arrived From: Home with spouse  Existing Help at Home: Spouse    Barriers to Discharge: None    Discharge Plan:    A. Home with family   B. Home      Discharge planning assessment completed with assistance from patient's spouse, Cta, at the bedside.  Patient's wife transports him to appts.  He uses multiple DME devices. He does not have home health.  When medically cleared, patient will discharge to home with spouse.  Patient has no preferred time for followup appts.            Admission Diagnosis: COPD exacerbation [J44.1]    Admission Date: 10/20/2023  Expected Discharge Date:     Transition of Care Barriers: None    Payor: [employer] MEDICARE / Plan: [employer] 65 / Product Type: Medicare Advantage /     Extended Emergency Contact Information  Primary Emergency Contact: Cat Richard Bridgett  Address: [address] [place] [address]  Home Phone: [phone]  Work Phone: [phone]  Mobile Phone: [phone]  Relation: Spouse    Discharge Plan A: Home with family  Discharge Plan B: Home      [id number] [place] - [address]  [address]  [place] [address]  Phone: [phone] Fax: [phone]    [id number] [place] - [address]  [address]  [facility] [address]  Phone: [phone] Fax: [phone]    [facility] [address] [address]  Phone: [phone] Fax: [phone]      Initial Assessment (most recent)       Adult Discharge Assessment - 10/20/23 1151          Discharge Assessment    Assessment Type Discharge Planning Assessment     Confirmed/corrected address, phone number and insurance Yes     Confirmed Demographics Correct on Facesheet     Source  of Information family;health record     When was your last doctors appointment? 10/04/23     Reason For Admission SOB     People in Home spouse     Facility Arrived From: Home     Do you expect to return to your current living situation? Yes     Do you have help at home or someone to help you manage your care at home? Yes     Who are your caregiver(s) and their phone number(s)? Wife:  Cat Richard: [phone]     Prior to hospitilization cognitive status: Alert/Oriented     Current cognitive status: Alert/Oriented     Home Accessibility not wheelchair accessible     Equipment Currently Used at Home walker, rolling;wheelchair;hospital bed;nutrition supplies;oxygen;bedside commode;feeding device;suction machine;respiratory supplies;shower chair     Readmission within 30 days? No     Patient currently being followed by outpatient case management? No     Do you currently have service(s) that help you manage your care at home? No     Do you take prescription medications? Yes     Do you have prescription coverage? Yes     Coverage Payor:   [employer] MANAGED MEDICARE - [employer] CHOICES 65     Do you have any problems affording any of your prescribed medications? No     Is the patient taking medications as prescribed? yes     Who is going to help you get home at discharge? wife     How do you get to doctors appointments? family or friend will provide     Are you on dialysis? No     Do you take coumadin? No     DME Needed Upon Discharge  none     Discharge Plan discussed with: Spouse/sig other;Patient     Transition of Care Barriers None     Discharge Plan A Home with family     Discharge Plan B Home

## 2023-10-20 NOTE — H&P
[facility] Medicine  History & Physical    Patient Name: Fareed Richard Jr.  MRN: [id number]  Patient Class: IP- Inpatient  Admission Date: 10/20/2023  Attending Physician: Nikita Castillo III, MD   Primary Care Provider: Kodi Tubbs MD         Patient information was obtained from patient, EMS personnel and ER records.     Subjective:     Principal Problem:Acute hypoxemic respiratory failure    Chief Complaint:   Chief Complaint   Patient presents with    Shortness of Breath     Ems reports at 3am pt became sob with O2 at 55%, reports doing treatment at home and improving. Reports being on 6L trach collar. Pt reports normal sats are 88-94%, ems reports 91%.        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 Per independent historian, EMS, patient's SpO2 was 88-91% on arrival. Patient's baseline is 88-94%. 82% in exam room. Patient is on home O2 of 6L with tracheostomy collar, but oxygen requirement continued to increase in the ed up to needing vapotherm and trach collar for adequate oxygenation.       Past Medical History:   Diagnosis Date    Cancer     skin to Rt forearm and face    COPD (chronic obstructive pulmonary disease)     Hyperlipidemia     Hypertension     Pseudoaneurysm        Past Surgical History:   Procedure Laterality Date    ABDOMINAL SURGERY      stents placed in liver and large intestines, per patient    ANGIOGRAPHY OF AORTIC ARCH  3/27/2023    Procedure: Aortogram, Aortic Arch;  Surgeon: Tim Bhatt MD;  Location: [facility] CATH LAB;  Service: Cardiology;;    AORTOGRAPHY WITH SERIALOGRAPHY N/A 3/27/2023    Procedure: AORTOGRAM, WITH SERIALOGRAPHY;  Surgeon: Tim Bhatt MD;  Location: [facility] CATH LAB;  Service: Cardiology;  Laterality: N/A;    CAROTID STENT       COLONOSCOPY N/A 09/27/2022    Procedure: COLONOSCOPY;  Surgeon: Donnie Peterson MD;  Location: [facility] ENDO (2ND FLR);  Service: Endoscopy;  Laterality: N/A;    COLONOSCOPY N/A 09/30/2022    Procedure: COLONOSCOPY;  Surgeon: Joy Cabrera MD;  Location: [facility] ENDO (2ND FLR);  Service: Endoscopy;  Laterality: N/A;    CORONARY STENT PLACEMENT  01/2000    DISSECTION OF NECK Bilateral 01/04/2022    Procedure: DISSECTION, NECK;  Surgeon: Naeem Smalls MD;  Location: [facility] OR 2ND FLR;  Service: ENT;  Laterality: Bilateral;    ESOPHAGOGASTRODUODENOSCOPY N/A 09/27/2022    Procedure: EGD (ESOPHAGOGASTRODUODENOSCOPY);  Surgeon: Donnie Peterson MD;  Location: [facility] ENDO (2ND FLR);  Service: Endoscopy;  Laterality: N/A;    ESOPHAGOGASTRODUODENOSCOPY N/A 09/30/2022    Procedure: EGD (ESOPHAGOGASTRODUODENOSCOPY);  Surgeon: Joy Cabrera MD;  Location: [facility] ENDO (2ND FLR);  Service: Endoscopy;  Laterality: N/A;    ESOPHAGOGASTRODUODENOSCOPY W/ PEG N/A 01/04/2022    Procedure: EGD, WITH PEG TUBE INSERTION;  Surgeon: Anthony Calabrese MD;  [facility];  Service: General;  Laterality: N/A;    EYE SURGERY      Cataract bilateral    femoral stents      bilateral    FLAP PROCEDURE N/A 01/03/2022    Procedure: CREATION, FREE FLAP;  Surgeon: Naeem Smalls MD;  Location: [facility] OR [facility] FLR;  Service: ENT;  Laterality: N/A;    FLAP PROCEDURE Right 01/04/2022    Procedure: CREATION, FREE FLAP;  Surgeon: Stacey Conde MD;  Location: [facility] OR [facility] FLR;  Service: ENT;  Laterality: Right;  Ischemic start 1203  Ischemic stop 1353    GLOSSECTOMY N/A 01/04/2022    Procedure: GLOSSECTOMY;  Surgeon: Naeem Smalls MD;  [facility];  Service: ENT;  Laterality: N/A;    LEFT HEART CATHETERIZATION Left 3/23/2023    Procedure: Left heart cath;  Surgeon: Tacos Benitez MD;  Location: [facility] CATH LAB;  Service: Cardiology;  Laterality: Left;    PERCUTANEOUS TRANSLUMINAL BALLOON ANGIOPLASTY OF CORONARY ARTERY  Left 3/27/2023    Procedure: Angioplasty-coronary;  Surgeon: Tim Bhatt MD;  Location: [facility] CATH LAB;  Service: Cardiology;  Laterality: Left;  not before 9am, R rad access, 6 Fr EBU 3.5 guide    RADICAL NECK DISSECTION Left 01/03/2022    Procedure: DISSECTION, NECK, RADICAL;  Surgeon: Naeem Smalls MD;  Location: [facility] OR [address] [place] [address];  Service: ENT;  Laterality: Left;    SKIN BIOPSY      SKIN CANCER EXCISION      STENT, CORONARY, MULTI VESSEL  3/27/2023    Procedure: Stent, Coronary, Multi Vessel;  Surgeon: Tim Bhatt MD;  Location: [facility] CATH LAB;  Service: Cardiology;;    TRACHEOTOMY N/A 01/04/2022    Procedure: TRACHEOTOMY;  Surgeon: Naeem Smalls MD;  Location: [facility] OR [address] [place] [address];  Service: ENT;  Laterality: N/A;    VASCULAR SURGERY         Review of patient's allergies indicates:   Allergen Reactions    Ativan [lorazepam] Anxiety       No current facility-administered medications on file prior to encounter.     Current Outpatient Medications on File Prior to Encounter   Medication Sig    albuterol-ipratropium (DUO-NEB) 2.5 mg-0.5 mg/3 mL nebulizer solution Take 3 mLs by nebulization every 4 (four) hours as needed for Wheezing. Rescue    ascorbic acid, vitamin C, (VITAMIN C) 100 MG tablet Take 100 mg by mouth once daily.    aspirin 81 MG Chew Take 1 tablet (81 mg total) by mouth once daily.    atorvastatin (LIPITOR) 40 MG tablet 1 tablet (40 mg total) by Per G Tube route every evening. (Patient taking differently: 40 mg by Per G Tube route once daily.)    bisacodyL (DULCOLAX) 10 mg Supp Place 1 suppository (10 mg total) rectally daily as needed.    clopidogreL (PLAVIX) 75 mg tablet Take 1 tablet (75 mg total) by mouth once daily. (Patient taking differently: Take 75 mg by mouth nightly.)    ferrous sulfate 325 (65 FE) MG EC tablet Take 325 mg by mouth 3 (three) times daily with meals.    FLUoxetine (PROZAC) 20 mg/5 mL (4 mg/mL) solution Take 20 mg by mouth once daily.     folic acid (FOLVITE) 1 MG tablet Take 1 mg by mouth once daily.    furosemide (LASIX) 40 MG tablet Take 0.5 tablets (20 mg total) by mouth once daily. (Patient taking differently: Take 20 mg by mouth nightly.)    glycopyrrolate (CUVPOSA) 1 mg/5 mL (0.2 mg/mL) Soln Take 5 mLs (1 mg total) by mouth 3 (three) times daily as needed (TO REDUCE SECRETIONS).    hydrOXYzine HCL (ATARAX) 25 MG tablet SMARTSI.5 Tablet(s) Gastro Tube Every 8 Hours PRN    melatonin (MELATIN) 3 mg tablet 2 tablets (6 mg total) by Per G Tube route nightly.    metoprolol tartrate (LOPRESSOR) 25 MG tablet Take 0.5 tablets (12.5 mg total) by mouth 2 (two) times daily.    omeprazole (PRILOSEC) 40 MG capsule Take 40 mg (contents of one capsule) twice daily through PEG tube. Mix contents of capsule with 10 mL applesauce. (Patient taking differently: 40 mg nightly. Take 40 mg (contents of one capsule) twice daily through PEG tube. Mix contents of capsule with 10 mL applesauce.)    thiamine (VITAMIN B-1) 50 MG tablet Take 50 mg by mouth once daily.    WIXELA INHUB 250-50 mcg/dose diskus inhaler SMARTSI Puff(s) By Mouth Every 12 Hours    polyethylene glycol (GLYCOLAX) 17 gram PwPk 17 g by Per G Tube route 2 (two) times daily.    sodium chloride 3% 3 % nebulizer solution Take 4 mLs by nebulization 2 (two) times a day.    [DISCONTINUED] guaiFENesin 200 mg/5 mL Liqd Take 400 mg by mouth every 4 (four) hours as needed (for secretions).    [DISCONTINUED] losartan (COZAAR) 50 MG tablet 1 tablet (50 mg total) by Per G Tube route once daily.     Family History    None       Tobacco Use    Smoking status: Former     Current packs/day: 0.00     Average packs/day: 2.0 packs/day for 55.0 years (110.0 ttl pk-yrs)     Types: Cigarettes     Start date: 1963     Quit date: 2018     Years since quittin.5    Smokeless tobacco: Never    Tobacco comments:     3/3 ppd x 40 yrs. Currently 3-4 cigarettes daily .He is trying  to quit. Is using  a Vapor cigarettes  2-3 x's a day.   Substance and Sexual Activity    Alcohol use: Not Currently    Drug use: No    Sexual activity: Not Currently     Review of Systems  Objective:     Vital Signs (Most Recent):  Temp: 97.6 °F (36.4 °C) (10/20/23 0750)  Pulse: 107 (10/20/23 1452)  Resp: (!) 25 (10/20/23 1452)  BP: 109/69 (10/20/23 1332)  SpO2: (!) 94 % (10/20/23 1452) Vital Signs (24h Range):  Temp:  [97.6 °F (36.4 °C)] 97.6 °F (36.4 °C)  Pulse:  [100-126] 107  Resp:  [20-27] 25  SpO2:  [88 %-100 %] 94 %  BP: (101-138)/(68-90) 109/69     Weight: 60.8 kg (134 lb)  Body mass index is 20.37 kg/m².     Physical Exam  Vitals reviewed.   Constitutional:       General: He is not in acute distress.     Appearance: He is ill-appearing and toxic-appearing.   HENT:      Head: Normocephalic and atraumatic.      Mouth/Throat:      Mouth: Mucous membranes are dry.      Pharynx: Oropharynx is clear.   Neck:      Comments: Trach in place with trach collar for oxygenation  Cardiovascular:      Rate and Rhythm: Normal rate and regular rhythm.   Pulmonary:      Effort: Pulmonary effort is normal. No respiratory distress.      Comments: Upper airway crackling noted.  Abdominal:      Palpations: Abdomen is soft.   Musculoskeletal:         General: No swelling or tenderness.      Cervical back: Neck supple. No rigidity.   Skin:     General: Skin is warm and dry.   Neurological:      General: No focal deficit present.      Mental Status: He is alert and oriented to person, place, and time.   Psychiatric:         Mood and Affect: Mood normal.         Behavior: Behavior normal.                Significant Labs: All pertinent labs within the past 24 hours have been reviewed.    Significant Imaging: I have reviewed all pertinent imaging results/findings within the past 24 hours.    Assessment/Plan:     * Acute hypoxemic respiratory failure  Patient with Hypoxic Respiratory failure which is Acute.  he is not on home oxygen. Supplemental  oxygen was provided and noted- Oxygen Concentration (%):  [35-70] 35    .   Signs/symptoms of respiratory failure include- increased work of breathing and respiratory distress. Contributing diagnoses includes - CHF, COPD and Pneumonia Labs and images were reviewed. Patient Has recent ABG, which has been reviewed. Will treat underlying causes and adjust management of respiratory failure as follows-     -started on abx for possible pna with doxycycline and zosyn  -continue steroids  -attempts diuresis as tolerated  -pulmonology consulted.    Acute on chronic diastolic heart failure  Attempting lasix for diuresis      PEG (percutaneous endoscopic gastrostomy) status  noted      Tracheostomy present  With pretty significant secretions noted.    -pulmonology consulted    ACP (advance care planning)  Advance Care Planning     Date: 10/20/2023  Pt wants to be full code at this time. Has home palliative. Palliative care following. 3 minutes spent in acp conversations          COPD exacerbation  tx as stated above      Primary hypertension  Restarting home medications. Currently controlled      Coronary artery disease involving native coronary artery of native heart with unstable angina pectoris  Continue home medications.      Squamous cell cancer of tongue  noted        VTE Risk Mitigation (From admission, onward)         Ordered     IP VTE HIGH RISK PATIENT  Once         10/20/23 1404     Place sequential compression device  Until discontinued         10/20/23 1404     Place sequential compression device  Until discontinued         10/20/23 1214              Critical care time spent on the evaluation and treatment of severe organ dysfunction, review of pertinent labs and imaging studies, discussions with consulting providers and discussions with patient/family: 45 minutes.                 Nikita Castillo III, MD  Department of Hospital Medicine  [facility] - Intensive Care

## 2023-10-20 NOTE — ASSESSMENT & PLAN NOTE
Most recent echo with EF 60%, biatrial enlargement and changes consistent with diastolic dysfunction.  BNP still 1000 with a CXR showing bilateral effusions.  No gross edema on exam, however.  Recommend aggressive diuresis with strict I/O.

## 2023-10-20 NOTE — ED PROVIDER NOTES
Encounter Date: 10/20/2023    SCRIBE #1 NOTE: I, Ysabel Estevez, am scribing for, and in the presence of,  Kodi Fuller MD. I have scribed the following portions of the note - Other sections scribed: HPI, ROS.       History     Chief Complaint   Patient presents with    Shortness of Breath     Ems reports at 3am pt became sob with O2 at 55%, reports doing treatment at home and improving. Reports being on 6L trach collar. Pt reports normal sats are 88-94%, ems reports 91%.     Fareed Richard Jr. is a 73 y.o. male, with a past medical history of COPD, CHF (EF 60%), HLD, HTN, and cancer, who presents to the ED via EMS with shortness of breath that began 1 week ago and increased at 3 am this morning. Patient received at home breathing treatment with relief. Per independent historian, EMS, patient's SpO2 was 88-91% on arrival. Patient's baseline is 88-94%. 82% in exam room. Patient is on home O2 of 6L with tracheostomy collar. Patient also notes cough. Patient denies chest pain, fever, chills, abdominal pain, nausea, vomiting, diarrhea, dysuria, headaches, congestion, sore throat, arm or leg trouble, eye pain, ear pain, rash, or other associated symptoms.      The history is provided by the patient and the EMS personnel. No  was used.     Review of patient's allergies indicates:   Allergen Reactions    Ativan [lorazepam] Anxiety     Past Medical History:   Diagnosis Date    Cancer     skin to Rt forearm and face    COPD (chronic obstructive pulmonary disease)     Hyperlipidemia     Hypertension     Pseudoaneurysm      Past Surgical History:   Procedure Laterality Date    ABDOMINAL SURGERY      stents placed in liver and large intestines, per patient    ANGIOGRAPHY OF AORTIC ARCH  3/27/2023    Procedure: Aortogram, Aortic Arch;  Surgeon: Tim Bhatt MD;  Location: [facility] CATH LAB;  Service: Cardiology;;    AORTOGRAPHY WITH SERIALOGRAPHY N/A 3/27/2023    Procedure: AORTOGRAM, WITH  SERIALOGRAPHY;  Surgeon: Tim Bhatt MD;  Location: [facility] CATH LAB;  Service: Cardiology;  Laterality: N/A;    CAROTID STENT      COLONOSCOPY N/A 09/27/2022    Procedure: COLONOSCOPY;  Surgeon: Donnie Peterson MD;  Location: [facility] ENDO (2ND FLR);  Service: Endoscopy;  Laterality: N/A;    COLONOSCOPY N/A 09/30/2022    Procedure: COLONOSCOPY;  Surgeon: Joy Cabrera MD;  Location: [facility] ENDO (2ND FLR);  Service: Endoscopy;  Laterality: N/A;    CORONARY STENT PLACEMENT  01/2000    DISSECTION OF NECK Bilateral 01/04/2022    Procedure: DISSECTION, NECK;  Surgeon: Naeem Smalls MD;  [facility];  Service: ENT;  Laterality: Bilateral;    ESOPHAGOGASTRODUODENOSCOPY N/A 09/27/2022    Procedure: EGD (ESOPHAGOGASTRODUODENOSCOPY);  Surgeon: Donnie Peterson MD;  Location: [facility] ENDO (2ND FLR);  Service: Endoscopy;  Laterality: N/A;    ESOPHAGOGASTRODUODENOSCOPY N/A 09/30/2022    Procedure: EGD (ESOPHAGOGASTRODUODENOSCOPY);  Surgeon: Joy Cabrera MD;  Location: [facility] ENDO (2ND FLR);  Service: Endoscopy;  Laterality: N/A;    ESOPHAGOGASTRODUODENOSCOPY W/ PEG N/A 01/04/2022    Procedure: EGD, WITH PEG TUBE INSERTION;  Surgeon: Anthony Calabrese MD;  [facility];  Service: General;  Laterality: N/A;    EYE SURGERY      Cataract bilateral    femoral stents      bilateral    FLAP PROCEDURE N/A 01/03/2022    Procedure: CREATION, FREE FLAP;  Surgeon: Naeem Smalls MD;  [facility];  Service: ENT;  Laterality: N/A;    FLAP PROCEDURE Right 01/04/2022    Procedure: CREATION, FREE FLAP;  Surgeon: Stacey Conde MD;  [facility];  Service: ENT;  Laterality: Right;  Ischemic start 1203  Ischemic stop 1353    GLOSSECTOMY N/A 01/04/2022    Procedure: GLOSSECTOMY;  Surgeon: Naeem Smalls MD;  [facility];  Service: ENT;  Laterality: N/A;    LEFT HEART CATHETERIZATION Left 3/23/2023    Procedure: Left heart cath;  Surgeon: Tacos Benitez MD;  Location:  [facility] CATH LAB;  Service: Cardiology;  Laterality: Left;    PERCUTANEOUS TRANSLUMINAL BALLOON ANGIOPLASTY OF CORONARY ARTERY Left 3/27/2023    Procedure: Angioplasty-coronary;  Surgeon: Tim Bhatt MD;  Location: [facility] CATH LAB;  Service: Cardiology;  Laterality: Left;  not before 9am, R rad access, 6 Fr EBU 3.5 guide    RADICAL NECK DISSECTION Left 2022    Procedure: DISSECTION, NECK, RADICAL;  Surgeon: Naeem Smalls MD;  Location: [facility] OR [address] [place] [address];  Service: ENT;  Laterality: Left;    SKIN BIOPSY      SKIN CANCER EXCISION      STENT, CORONARY, MULTI VESSEL  3/27/2023    Procedure: Stent, Coronary, Multi Vessel;  Surgeon: Tim Bhatt MD;  Location: [facility] CATH LAB;  Service: Cardiology;;    TRACHEOTOMY N/A 2022    Procedure: TRACHEOTOMY;  Surgeon: Naeem Smalls MD;  Location: [facility] OR [address] [place] [address];  Service: ENT;  Laterality: N/A;    VASCULAR SURGERY       History reviewed. No pertinent family history.  Social History     Tobacco Use    Smoking status: Former     Current packs/day: 0.00     Average packs/day: 2.0 packs/day for 55.0 years (110.0 ttl pk-yrs)     Types: Cigarettes     Start date: 1963     Quit date: 2018     Years since quittin.5    Smokeless tobacco: Never    Tobacco comments:     3/3 ppd x 40 yrs. Currently 3-4 cigarettes daily .He is trying  to quit. Is using a Vapor cigarettes  2-3 x's a day.   Substance Use Topics    Alcohol use: Not Currently    Drug use: No     Review of Systems   Constitutional:  Negative for chills, diaphoresis and fever.   HENT:  Negative for ear pain and sore throat.    Eyes:  Negative for pain.   Respiratory:  Positive for cough and shortness of breath.    Cardiovascular:  Negative for chest pain.   Gastrointestinal:  Negative for abdominal pain, diarrhea, nausea and vomiting.   Genitourinary:  Negative for dysuria.   Musculoskeletal:  Negative for back pain.        (-) Arm or leg trouble.    Skin:  Negative for rash.    Neurological:  Negative for headaches.   Psychiatric/Behavioral:  Negative for confusion.        Physical Exam     Initial Vitals [10/20/23 0750]   BP Pulse Resp Temp SpO2   138/88 110 20 97.6 °F (36.4 °C) (!) 91 %      MAP       --         Physical Exam  The patient was examined specifically for the following:   General:No significant distress, Good color, Warm and dry. Head and neck:Scalp atraumatic, Neck supple. Neurological:Appropriate conversation, Gross motor deficits. Eyes:Conjugate gaze, Clear corneas. ENT: No epistaxis. Cardiac: Regular rate and rhythm, Grossly normal heart tones. Pulmonary: Wheezing, Rales. Gastrointestinal: Abdominal tenderness, Abdominal distention. Musculoskeletal: Extremity deformity, Apparent pain with range of motion of the joints. Skin: Rash.   The findings on examination were normal except for the following:  The patient has a tracheostomy in place.  The patient has mild respiratory distress.  He has good color.  Breath sounds are distant.  Heart tones are normal.  The patient has regular rate and rhythm.  Oxygen saturations are 82% during the physical exam.  There is no leg edema.  There may be a few rales in the left base.  There is no jamil wheezing auscultated.  ED Course   Critical Care    Date/Time: 10/20/2023 10:41 AM    Performed by: Kodi Fuller MD  Authorized by: Kodi Fulelr MD  Direct patient critical care time: 22 minutes  Additional history critical care time: 11 minutes  Ordering / reviewing critical care time: 11 minutes  Documentation critical care time: 11 minutes  Consulting other physicians critical care time: 11 minutes  Consult with family critical care time: 5 minutes  Total critical care time (exclusive of procedural time) : 71 minutes  Critical care time was exclusive of separately billable procedures and treating other patients and teaching time.  Critical care was necessary to treat or prevent imminent or life-threatening deterioration of the  following conditions: respiratory failure.  Critical care was time spent personally by me on the following activities: development of treatment plan with patient or surrogate, discussions with primary provider, evaluation of patient's response to treatment, examination of patient, obtaining history from patient or surrogate, ordering and performing treatments and interventions, ordering and review of laboratory studies, ordering and review of radiographic studies, pulse oximetry, re-evaluation of patient's condition and review of old charts.        Labs Reviewed   COMPREHENSIVE METABOLIC PANEL - Abnormal; Notable for the following components:       Result Value    CO2 30 (*)     Glucose 141 (*)     Albumin 3.0 (*)     All other components within normal limits   CBC W/ AUTO DIFFERENTIAL - Abnormal; Notable for the following components:    RBC 3.98 (*)     Hemoglobin 10.9 (*)     Hematocrit 35.9 (*)     MCHC 30.4 (*)     RDW 14.7 (*)     Lymph # 0.5 (*)     Gran % 82.5 (*)     Lymph % 6.1 (*)     All other components within normal limits   B-TYPE NATRIURETIC PEPTIDE - Abnormal; Notable for the following components:     (*)     All other components within normal limits   TROPONIN I - Abnormal; Notable for the following components:    Troponin I 0.051 (*)     All other components within normal limits     EKG Readings: (Independently Interpreted)   This patient is in a sinus tachycardia with a heart rate of 119.  The the patient has an intraventricular conduction delay.  This is new since the 20th of October.  There are nonspecific ST segment and T-wave changes.       Imaging Results              X-Ray Chest AP Portable (Final result)  Result time 10/20/23 09:06:06      Final result by Mariano Soto MD (10/20/23 09:06:06)                   Impression:      Similar but mildly improved aeration when compared with 04/21/2023.    Additional findings discussed in the body of the report.      Electronically signed  "by: Mariano Soto MD  Date:    10/20/2023  Time:    09:06               Narrative:    EXAMINATION:  XR CHEST AP PORTABLE    CLINICAL HISTORY:  Provided history is "chest pain;  ".    TECHNIQUE:  One view of the chest.    COMPARISON:  04/21/2023.    FINDINGS:  Tracheostomy tube is present with the tip overlying the tracheal air column below the level of the clavicular heads.  Cardiomediastinal silhouette is prominent and similar to the prior study.  Atherosclerotic calcifications overlie the aortic arch.  Coarsened interstitial lung markings and significant pulmonary emphysema.  Small bilateral pleural effusions, left side greater than right.  Bibasilar aeration is similar to slightly improved when compared with 04/21/2023.  No new large focal consolidation.  No distinct pneumothorax.                                       Medications   albuterol-ipratropium 2.5 mg-0.5 mg/3 mL nebulizer solution 3 mL (3 mLs Nebulization Given 10/20/23 0811)   dexAMETHasone injection 10 mg (10 mg Intravenous Given 10/20/23 0831)   albuterol sulfate nebulizer solution 5 mg (5 mg Nebulization Given 10/20/23 1043)     Medical Decision Making  Amount and/or Complexity of Data Reviewed  Labs: ordered.  Radiology: ordered.  ECG/medicine tests: ordered and independent interpretation performed.     Details: EKG independently interpreted by Dr. Fuller, see above.     Risk  Prescription drug management.  Decision regarding hospitalization.    Given the above, this patient presents to the emergency room with shortness of breath.  The patient has oxygen saturations of 83% here chest x-ray fails to reveal pneumonia congestive heart failure.  The patient has an elevated BNP.  He has had BNP elevations this high before.  Has a slightly elevated troponin, down from recent studies.  It last admission he had angiography.  The patient has known coronary artery disease.  He has stents.  He has a new intraventricular conduction delay.  He did not " respond well to bronchodilators and a collar .  Oxygen saturations are still 83% at 10:13 a.m..  We are moving to Vapotherm.  We are considering changing out his tracheostomy tube to a cuffed tube.  The patient has had difficulty like this before.  Suctioning removes thick yellow phlegm.   The patient was placed on 70% Vapotherm by nasal cannula.  He was also placed on a 50% cannula over his trach collar.  We were able to get his oxygen saturations up to 95%.  Dr. Castillo was consulted for the ICU.  He agrees to accept the patient to the hospital.  We did not find pneumonia on the chest x-ray.  The patient has an elevated BNP but no radiographic evidence of congestive heart failure.  The patient has new intraventricular conduction delay a slightly elevated troponin, but no chest pain.  The BNP is slightly elevated at a 1000.  The patient has been higher than this before.  I will admit the patient to the ICU.        Scribe Attestation:   Scribe #1: I performed the above scribed service and the documentation accurately describes the services I performed. I attest to the accuracy of the note.                      I personally performed the services described in this documentation.  All medical record  entries made by the scribe are at my direction and in my presence.  Signed, Dr. Fuller  Clinical Impression:   Final diagnoses:  [R06.02] Shortness of breath  [J44.1] COPD exacerbation (Primary)  [I45.9] Intraventricular conduction delay  [R79.89] Elevated troponin  [R79.89] Elevated brain natriuretic peptide (BNP) level  [R09.02] Hypoxia  [J96.21] Acute on chronic respiratory failure with hypoxia        ED Disposition Condition    Admit Stable                Kodi Fuller MD  10/20/23 1044       Kodi Fuller MD  10/20/23 1126

## 2023-10-20 NOTE — ASSESSMENT & PLAN NOTE
Patient with Hypoxic Respiratory failure which is Acute.  he is not on home oxygen. Supplemental oxygen was provided and noted- Oxygen Concentration (%):  [35-70] 35    .   Signs/symptoms of respiratory failure include- increased work of breathing and respiratory distress. Contributing diagnoses includes - CHF, COPD and Pneumonia Labs and images were reviewed. Patient Has recent ABG, which has been reviewed. Will treat underlying causes and adjust management of respiratory failure as follows-     -started on abx for possible pna with doxycycline and zosyn  -continue steroids  -attempts diuresis as tolerated  -pulmonology consulted.

## 2023-10-21 LAB
ALBUMIN SERPL BCP-MCNC: 3 G/DL (ref 3.5–5.2)
ALP SERPL-CCNC: 88 U/L (ref 55–135)
ALT SERPL W/O P-5'-P-CCNC: 16 U/L (ref 10–44)
ANION GAP SERPL CALC-SCNC: 8 MMOL/L (ref 8–16)
AST SERPL-CCNC: 18 U/L (ref 10–40)
BASOPHILS # BLD AUTO: 0.01 K/UL (ref 0–0.2)
BASOPHILS NFR BLD: 0.1 % (ref 0–1.9)
BILIRUB SERPL-MCNC: 0.6 MG/DL (ref 0.1–1)
BUN SERPL-MCNC: 26 MG/DL (ref 8–23)
CALCIUM SERPL-MCNC: 9.2 MG/DL (ref 8.7–10.5)
CHLORIDE SERPL-SCNC: 96 MMOL/L (ref 95–110)
CO2 SERPL-SCNC: 31 MMOL/L (ref 23–29)
CREAT SERPL-MCNC: 0.9 MG/DL (ref 0.5–1.4)
DIFFERENTIAL METHOD: ABNORMAL
EOSINOPHIL # BLD AUTO: 0 K/UL (ref 0–0.5)
EOSINOPHIL NFR BLD: 0.1 % (ref 0–8)
ERYTHROCYTE [DISTWIDTH] IN BLOOD BY AUTOMATED COUNT: 14.7 % (ref 11.5–14.5)
EST. GFR  (NO RACE VARIABLE): >60 ML/MIN/1.73 M^2
GLUCOSE SERPL-MCNC: 139 MG/DL (ref 70–110)
HCT VFR BLD AUTO: 35.3 % (ref 40–54)
HGB BLD-MCNC: 10.5 G/DL (ref 14–18)
IMM GRANULOCYTES # BLD AUTO: 0.03 K/UL (ref 0–0.04)
IMM GRANULOCYTES NFR BLD AUTO: 0.4 % (ref 0–0.5)
LYMPHOCYTES # BLD AUTO: 0.4 K/UL (ref 1–4.8)
LYMPHOCYTES NFR BLD: 5 % (ref 18–48)
MAGNESIUM SERPL-MCNC: 2 MG/DL (ref 1.6–2.6)
MCH RBC QN AUTO: 27.6 PG (ref 27–31)
MCHC RBC AUTO-ENTMCNC: 29.7 G/DL (ref 32–36)
MCV RBC AUTO: 93 FL (ref 82–98)
MONOCYTES # BLD AUTO: 0.6 K/UL (ref 0.3–1)
MONOCYTES NFR BLD: 7.7 % (ref 4–15)
NEUTROPHILS # BLD AUTO: 6.5 K/UL (ref 1.8–7.7)
NEUTROPHILS NFR BLD: 86.7 % (ref 38–73)
NRBC BLD-RTO: 0 /100 WBC
PHOSPHATE SERPL-MCNC: 4 MG/DL (ref 2.7–4.5)
PLATELET # BLD AUTO: 159 K/UL (ref 150–450)
PMV BLD AUTO: 10 FL (ref 9.2–12.9)
POTASSIUM SERPL-SCNC: 4.3 MMOL/L (ref 3.5–5.1)
PROT SERPL-MCNC: 7.2 G/DL (ref 6–8.4)
RBC # BLD AUTO: 3.8 M/UL (ref 4.6–6.2)
SODIUM SERPL-SCNC: 135 MMOL/L (ref 136–145)
WBC # BLD AUTO: 7.55 K/UL (ref 3.9–12.7)

## 2023-10-21 PROCEDURE — 84100 ASSAY OF PHOSPHORUS: CPT | Performed by: STUDENT IN AN ORGANIZED HEALTH CARE EDUCATION/TRAINING PROGRAM

## 2023-10-21 PROCEDURE — 94640 AIRWAY INHALATION TREATMENT: CPT

## 2023-10-21 PROCEDURE — 99900035 HC TECH TIME PER 15 MIN (STAT)

## 2023-10-21 PROCEDURE — 83735 ASSAY OF MAGNESIUM: CPT | Performed by: STUDENT IN AN ORGANIZED HEALTH CARE EDUCATION/TRAINING PROGRAM

## 2023-10-21 PROCEDURE — 25000242 PHARM REV CODE 250 ALT 637 W/ HCPCS: Performed by: STUDENT IN AN ORGANIZED HEALTH CARE EDUCATION/TRAINING PROGRAM

## 2023-10-21 PROCEDURE — 20000000 HC ICU ROOM

## 2023-10-21 PROCEDURE — 99233 PR SUBSEQUENT HOSPITAL CARE,LEVL III: ICD-10-PCS | Mod: ,,, | Performed by: INTERNAL MEDICINE

## 2023-10-21 PROCEDURE — 99233 SBSQ HOSP IP/OBS HIGH 50: CPT | Mod: ,,, | Performed by: INTERNAL MEDICINE

## 2023-10-21 PROCEDURE — 94761 N-INVAS EAR/PLS OXIMETRY MLT: CPT

## 2023-10-21 PROCEDURE — 36415 COLL VENOUS BLD VENIPUNCTURE: CPT | Performed by: STUDENT IN AN ORGANIZED HEALTH CARE EDUCATION/TRAINING PROGRAM

## 2023-10-21 PROCEDURE — 80053 COMPREHEN METABOLIC PANEL: CPT | Performed by: STUDENT IN AN ORGANIZED HEALTH CARE EDUCATION/TRAINING PROGRAM

## 2023-10-21 PROCEDURE — 63600175 PHARM REV CODE 636 W HCPCS: Performed by: STUDENT IN AN ORGANIZED HEALTH CARE EDUCATION/TRAINING PROGRAM

## 2023-10-21 PROCEDURE — 27100171 HC OXYGEN HIGH FLOW UP TO 24 HOURS

## 2023-10-21 PROCEDURE — 85025 COMPLETE CBC W/AUTO DIFF WBC: CPT | Performed by: STUDENT IN AN ORGANIZED HEALTH CARE EDUCATION/TRAINING PROGRAM

## 2023-10-21 PROCEDURE — 25000003 PHARM REV CODE 250: Performed by: STUDENT IN AN ORGANIZED HEALTH CARE EDUCATION/TRAINING PROGRAM

## 2023-10-21 PROCEDURE — C9113 INJ PANTOPRAZOLE SODIUM, VIA: HCPCS | Performed by: STUDENT IN AN ORGANIZED HEALTH CARE EDUCATION/TRAINING PROGRAM

## 2023-10-21 RX ORDER — IPRATROPIUM BROMIDE AND ALBUTEROL SULFATE 2.5; .5 MG/3ML; MG/3ML
3 SOLUTION RESPIRATORY (INHALATION) EVERY 4 HOURS PRN
Status: DISCONTINUED | OUTPATIENT
Start: 2023-10-21 | End: 2023-10-28 | Stop reason: HOSPADM

## 2023-10-21 RX ADMIN — PIPERACILLIN AND TAZOBACTAM 4.5 G: 4; .5 INJECTION, POWDER, LYOPHILIZED, FOR SOLUTION INTRAVENOUS; PARENTERAL at 12:10

## 2023-10-21 RX ADMIN — PANTOPRAZOLE SODIUM 40 MG: 40 INJECTION, POWDER, FOR SOLUTION INTRAVENOUS at 09:10

## 2023-10-21 RX ADMIN — GLYCOPYRROLATE 1 MG: 1 TABLET ORAL at 04:10

## 2023-10-21 RX ADMIN — FUROSEMIDE 20 MG: 10 INJECTION, SOLUTION INTRAMUSCULAR; INTRAVENOUS at 09:10

## 2023-10-21 RX ADMIN — GLYCOPYRROLATE 1 MG: 1 TABLET ORAL at 09:10

## 2023-10-21 RX ADMIN — IPRATROPIUM BROMIDE AND ALBUTEROL SULFATE 3 ML: 2.5; .5 SOLUTION RESPIRATORY (INHALATION) at 02:10

## 2023-10-21 RX ADMIN — FUROSEMIDE 20 MG: 10 INJECTION, SOLUTION INTRAMUSCULAR; INTRAVENOUS at 08:10

## 2023-10-21 RX ADMIN — POLYETHYLENE GLYCOL 3350 17 G: 17 POWDER, FOR SOLUTION ORAL at 09:10

## 2023-10-21 RX ADMIN — METOPROLOL TARTRATE 12.5 MG: 25 TABLET, FILM COATED ORAL at 08:10

## 2023-10-21 RX ADMIN — PIPERACILLIN AND TAZOBACTAM 4.5 G: 4; .5 INJECTION, POWDER, LYOPHILIZED, FOR SOLUTION INTRAVENOUS; PARENTERAL at 04:10

## 2023-10-21 RX ADMIN — IPRATROPIUM BROMIDE AND ALBUTEROL SULFATE 3 ML: 2.5; .5 SOLUTION RESPIRATORY (INHALATION) at 06:10

## 2023-10-21 RX ADMIN — SODIUM CHLORIDE SOLN NEBU 3% 4 ML: 3 NEBU SOLN at 08:10

## 2023-10-21 RX ADMIN — PREDNISONE 40 MG: 20 TABLET ORAL at 09:10

## 2023-10-21 RX ADMIN — GLYCOPYRROLATE 1 MG: 1 TABLET ORAL at 08:10

## 2023-10-21 RX ADMIN — ASPIRIN 81 MG CHEWABLE TABLET 81 MG: 81 TABLET CHEWABLE at 09:10

## 2023-10-21 RX ADMIN — SODIUM CHLORIDE SOLN NEBU 3% 4 ML: 3 NEBU SOLN at 07:10

## 2023-10-21 RX ADMIN — PIPERACILLIN AND TAZOBACTAM 4.5 G: 4; .5 INJECTION, POWDER, LYOPHILIZED, FOR SOLUTION INTRAVENOUS; PARENTERAL at 08:10

## 2023-10-21 RX ADMIN — ATORVASTATIN CALCIUM 40 MG: 40 TABLET, FILM COATED ORAL at 08:10

## 2023-10-21 RX ADMIN — MUPIROCIN: 20 OINTMENT TOPICAL at 09:10

## 2023-10-21 RX ADMIN — MUPIROCIN: 20 OINTMENT TOPICAL at 08:10

## 2023-10-21 RX ADMIN — FOLIC ACID 1 MG: 1 TABLET ORAL at 09:10

## 2023-10-21 RX ADMIN — DOXYCYCLINE HYCLATE 100 MG: 100 TABLET, COATED ORAL at 08:10

## 2023-10-21 RX ADMIN — DOXYCYCLINE HYCLATE 100 MG: 100 TABLET, COATED ORAL at 09:10

## 2023-10-21 RX ADMIN — FLUOXETINE 20 MG: 20 CAPSULE ORAL at 09:10

## 2023-10-21 RX ADMIN — IPRATROPIUM BROMIDE AND ALBUTEROL SULFATE 3 ML: 2.5; .5 SOLUTION RESPIRATORY (INHALATION) at 08:10

## 2023-10-21 RX ADMIN — IPRATROPIUM BROMIDE AND ALBUTEROL SULFATE 3 ML: 2.5; .5 SOLUTION RESPIRATORY (INHALATION) at 07:10

## 2023-10-21 RX ADMIN — METOPROLOL TARTRATE 12.5 MG: 25 TABLET, FILM COATED ORAL at 09:10

## 2023-10-21 NOTE — ASSESSMENT & PLAN NOTE
Afebrile with normal WBC.  CXR with coarsened interstitial and bilateral effusions, but no dense consolidation.  BNP 1000 in the setting of known chronic diastolic heart failure.  Reasonable to treat for COPD exacerbation.  Agree with empiric doxycycline for cap.  Obtain sputum culture if possible.  Recommend diuresis and maintain strict I/O.  Continue to use Vapotherm in the interim given the amount of secretions coming through the tracheostomy.  If patient continues to develop worsening respiratory distress, can swap out trach for cuffed Shiley in the event that he needs positive pressure ventilation    10/21:  Breathing much more easily today after diuresis and COPD treatment.  Now on low flow nasal cannula and breathing comfortably with SpO2 > 95%.  No significant CO2 retention noted on yesterday's blood gas and baseline CO2 on metabolic panels is generally around 30 to argue against significant chronic hypercapnia.  Will continue with current uncuffed trach tube and treat for multifactorial contributions to respiratory failure.

## 2023-10-21 NOTE — ASSESSMENT & PLAN NOTE
Most recent echo with EF 60%, biatrial enlargement and changes consistent with diastolic dysfunction.  BNP still 1000 with a CXR showing bilateral effusions.  No gross edema on exam, however.  Recommend aggressive diuresis with strict I/O.    10/21:  Probably would benefit from another day of diuresis.

## 2023-10-21 NOTE — NURSING
[facility]  Nurses Note -- 4 Eyes      10/20/2023       Skin assessed on: Q Shift      [x] No Pressure Injuries Present    [x]Prevention Measures Documented    [] Yes LDA  for Pressure Injury Previously documented     [] Yes New Pressure Injury Discovered   [] LDA for New Pressure Injury Added      Attending RN:  LAUREEN Mendez RN      Second RN:  SHANNAN Menjivar RN

## 2023-10-21 NOTE — SUBJECTIVE & OBJECTIVE
Interval History: 10/21:  Breathing is better after fair diuresis yesterday. As per Respiratory, SpO2 is better with nasal cannula oxygen (even at just 2-3 lpm) than by trach collar.  Current trach is uncuffed 7.5 mm inner diameter replaced in clinic by Dr. Smalls (ENT) earlier this month.  Not much wheezing noted today on auscultation.    Objective:     Vital Signs (Most Recent):  Temp: 98 °F (36.7 °C) (10/21/23 0701)  Pulse: 68 (10/21/23 1119)  Resp: (!) 22 (10/21/23 1119)  BP: 136/69 (10/21/23 1000)  SpO2: 99 % (10/21/23 1119) Vital Signs (24h Range):  Temp:  [97.9 °F (36.6 °C)-99.4 °F (37.4 °C)] 98 °F (36.7 °C)  Pulse:  [] 68  Resp:  [14-35] 22  SpO2:  [91 %-100 %] 99 %  BP: (101-147)/(65-96) 136/69     Weight: 59.3 kg (130 lb 11.7 oz)  Body mass index is 19.88 kg/m².      Intake/Output Summary (Last 24 hours) at 10/21/2023 1201  Last data filed at 10/21/2023 1000  Gross per 24 hour   Intake 274.44 ml   Output 1100 ml   Net -825.56 ml        Physical Exam  Vitals and nursing note reviewed.   Neck:      Comments: Tracheostomy in place.  Cardiovascular:      Rate and Rhythm: Normal rate and regular rhythm.      Heart sounds: Normal heart sounds. No murmur heard.     No gallop.   Pulmonary:      Effort: Pulmonary effort is normal.      Breath sounds: No wheezing, rhonchi or rales.   Musculoskeletal:      Right lower leg: No edema.      Left lower leg: No edema.   Neurological:      Mental Status: He is alert.           Review of Systems    Vents:  Oxygen Concentration (%): 35 (10/21/23 1119)    Lines/Drains/Airways       Drain  Duration                  Gastrostomy/Enterostomy 01/04/22 Percutaneous endoscopic gastrostomy (PEG)  days    Male External Urinary Catheter 10/20/23 2100 <1 day              Airway  Duration             Adult Surgical Airway 03/16/23 1014 Shiley Cuffed 6.0 219 days              Peripheral Intravenous Line  Duration                  Peripheral IV - Single Lumen 10/20/23 0817 20  G Anterior;Left Forearm 1 day         Peripheral IV - Single Lumen 10/20/23 1557 20 G Anterior;Right Forearm <1 day                    Significant Labs:    CBC/Anemia Profile:  Recent Labs   Lab 10/20/23  0815 10/21/23  0516   WBC 8.82 7.55   HGB 10.9* 10.5*   HCT 35.9* 35.3*    159   MCV 90 93   RDW 14.7* 14.7*        Chemistries:  Recent Labs   Lab 10/20/23  0815 10/21/23  0516    135*   K 4.6 4.3   CL 96 96   CO2 30* 31*   BUN 23 26*   CREATININE 0.8 0.9   CALCIUM 9.4 9.2   ALBUMIN 3.0* 3.0*   PROT 7.2 7.2   BILITOT 0.4 0.6   ALKPHOS 100 88   ALT 21 16   AST 18 18   MG  --  2.0   PHOS  --  4.0       All pertinent labs within the past 24 hours have been reviewed.    Significant Imaging:  I have reviewed all pertinent imaging results/findings within the past 24 hours.

## 2023-10-21 NOTE — PROGRESS NOTES
[facility] Intensive Care  Pulmonology  Progress Note    Patient Name: Fareed Richard Jr.  MRN: [id number]  Admission Date: 10/20/2023  Hospital Length of Stay: 1 days  Code Status: Full Code  Attending Provider: Nikita Castillo III, MD  Primary Care Provider: Kodi Tubbs MD   Principal Problem: Acute hypoxemic respiratory failure    Subjective:     Interval History: 10/21:  Breathing is better after fair diuresis yesterday. As per Respiratory, SpO2 is better with nasal cannula oxygen (even at just 2-3 lpm) than by trach collar.  Current trach is uncuffed 7.5 mm inner diameter replaced in clinic by Dr. Smalls (ENT) earlier this month.  Not much wheezing noted today on auscultation.    Objective:     Vital Signs (Most Recent):  Temp: 98 °F (36.7 °C) (10/21/23 0701)  Pulse: 68 (10/21/23 1119)  Resp: (!) 22 (10/21/23 1119)  BP: 136/69 (10/21/23 1000)  SpO2: 99 % (10/21/23 1119) Vital Signs (24h Range):  Temp:  [97.9 °F (36.6 °C)-99.4 °F (37.4 °C)] 98 °F (36.7 °C)  Pulse:  [] 68  Resp:  [14-35] 22  SpO2:  [91 %-100 %] 99 %  BP: (101-147)/(65-96) 136/69     Weight: 59.3 kg (130 lb 11.7 oz)  Body mass index is 19.88 kg/m².      Intake/Output Summary (Last 24 hours) at 10/21/2023 1201  Last data filed at 10/21/2023 1000  Gross per 24 hour   Intake 274.44 ml   Output 1100 ml   Net -825.56 ml        Physical Exam  Vitals and nursing note reviewed.   Neck:      Comments: Tracheostomy in place.  Cardiovascular:      Rate and Rhythm: Normal rate and regular rhythm.      Heart sounds: Normal heart sounds. No murmur heard.     No gallop.   Pulmonary:      Effort: Pulmonary effort is normal.      Breath sounds: No wheezing, rhonchi or rales.   Musculoskeletal:      Right lower leg: No edema.      Left lower leg: No edema.   Neurological:      Mental Status: He is alert.           Review of Systems    Vents:  Oxygen Concentration (%): 35 (10/21/23 1119)    Lines/Drains/Airways       Drain  Duration                   Gastrostomy/Enterostomy 01/04/22 Percutaneous endoscopic gastrostomy (PEG)  days    Male External Urinary Catheter 10/20/23 2100 <1 day              Airway  Duration             Adult Surgical Airway 03/16/23 1014 Shiley Cuffed 6.0 219 days              Peripheral Intravenous Line  Duration                  Peripheral IV - Single Lumen 10/20/23 0817 20 G Anterior;Left Forearm 1 day         Peripheral IV - Single Lumen 10/20/23 1557 20 G Anterior;Right Forearm <1 day                    Significant Labs:    CBC/Anemia Profile:  Recent Labs   Lab 10/20/23  0815 10/21/23  0516   WBC 8.82 7.55   HGB 10.9* 10.5*   HCT 35.9* 35.3*    159   MCV 90 93   RDW 14.7* 14.7*        Chemistries:  Recent Labs   Lab 10/20/23  0815 10/21/23  0516    135*   K 4.6 4.3   CL 96 96   CO2 30* 31*   BUN 23 26*   CREATININE 0.8 0.9   CALCIUM 9.4 9.2   ALBUMIN 3.0* 3.0*   PROT 7.2 7.2   BILITOT 0.4 0.6   ALKPHOS 100 88   ALT 21 16   AST 18 18   MG  --  2.0   PHOS  --  4.0       All pertinent labs within the past 24 hours have been reviewed.    Significant Imaging:  I have reviewed all pertinent imaging results/findings within the past 24 hours.      ABG  Recent Labs   Lab 10/20/23  1452   PH 7.438   PO2 55*   PCO2 49.3*   HCO3 33.3*   BE 8*     Assessment/Plan:     ENT  Tracheostomy present  · Tracheotomy at time of glossectomy by Dr. Smalls in January 2022.  · Routine trach changes in ENT clinic since that time.    · Most recent trach change on 10/9/2023 => currently with uncuffed 7.5mm inner diameter tracheostomy tube in place.      Routine trach care with suctioning PRN.  Hypertonic saline nebs BID PRN.    Pulmonary  * Acute hypoxemic respiratory failure  Afebrile with normal WBC.  CXR with coarsened interstitial and bilateral effusions, but no dense consolidation.  BNP 1000 in the setting of known chronic diastolic heart failure.  Reasonable to treat for COPD exacerbation.  Agree with empiric doxycycline for cap.   Obtain sputum culture if possible.  Recommend diuresis and maintain strict I/O.  Continue to use Vapotherm in the interim given the amount of secretions coming through the tracheostomy.  If patient continues to develop worsening respiratory distress, can swap out trach for cuffed Shiley in the event that he needs positive pressure ventilation    10/21:  Breathing much more easily today after diuresis and COPD treatment.  Now on low flow nasal cannula and breathing comfortably with SpO2 > 95%.  No significant CO2 retention noted on yesterday's blood gas and baseline CO2 on metabolic panels is generally around 30 to argue against significant chronic hypercapnia.  Will continue with current uncuffed trach tube and treat for multifactorial contributions to respiratory failure.     Cardiac/Vascular  Acute on chronic diastolic heart failure  Most recent echo with EF 60%, biatrial enlargement and changes consistent with diastolic dysfunction.  BNP still 1000 with a CXR showing bilateral effusions.  No gross edema on exam, however.  Recommend aggressive diuresis with strict I/O.    10/21:  Probably would benefit from another day of diuresis.    Coronary artery disease involving native coronary artery of native heart with unstable angina pectoris  Underwent multivessel PCI back in March of this year.  Trop minimally elevated.  Does not appear to be acute coronary issue at this time.           Nikita Ling MD  Pulmonology  [facility] - Intensive Care

## 2023-10-21 NOTE — ASSESSMENT & PLAN NOTE
Patient with Hypoxic Respiratory failure which is Acute.  he is not on home oxygen. Supplemental oxygen was provided and noted- Oxygen Concentration (%):  [30-40] 35    .   Signs/symptoms of respiratory failure include- increased work of breathing and respiratory distress. Contributing diagnoses includes - CHF, COPD and Pneumonia Labs and images were reviewed. Patient Has recent ABG, which has been reviewed. Will treat underlying causes and adjust management of respiratory failure as follows-     -started on abx for possible pna with doxycycline and zosyn  -continue steroids  -attempts diuresis as tolerated and weaning o2  -pulmonology consulted.

## 2023-10-21 NOTE — NURSING
[facility] Intensive Care  ICU Shift Summary  Date: 10/21/2023      Prehospitalization: Home  Admit Date / LOS : 10/20/2023/ 1 days    Diagnosis: Acute hypoxemic respiratory failure    Consults:        Active: Palliative and Pulm CC       Needed: N/A     Code Status: Full Code   Advanced Directive: Not Received    LDA:  Lines/Drains/Airways       Drain  Duration                  Gastrostomy/Enterostomy 01/04/22 Percutaneous endoscopic gastrostomy (PEG)  days    Male External Urinary Catheter 10/20/23 2100 <1 day              Airway  Duration             Adult Surgical Airway 03/16/23 1014 Shiley Cuffed 6.0 218 days              Peripheral Intravenous Line  Duration                  Peripheral IV - Single Lumen 10/20/23 0817 20 G Anterior;Left Forearm <1 day         Peripheral IV - Single Lumen 10/20/23 1557 20 G Anterior;Right Forearm <1 day                  Central Lines/Site/Justification:Patient Does Not Have Central Line  Urinary Cath/Order/Justification:Patient Does Not Have Urinary Catheter    Vasopressors/Infusions:    sodium chloride 0.9% Stopped (10/20/23 4414)          GOALS: Volume/ Hemodynamic: N/A                     RASS: 0  alert and calm    Pain Management: none       Pain Controlled: not applicable     Rhythm: NSR    Respiratory Device: Vapotherm and Venti Mask    Oxygen Concentration (%):  [30-70] 30                 Most Recent SBT/ SAT: N/A       MOVE Screen: PASS  ICU Liberation: no    VTE Prophylaxis: Pharm  Mobility: OOB to Chair  Stress Ulcer Prophylaxis: No    Isolation: No active isolations    Dietary:   Current Diet Order   Procedures    Diet NPO      Tolerance: not applicable  Advancement:     I & O (24h):    Intake/Output Summary (Last 24 hours) at 10/21/2023 0537  Last data filed at 10/20/2023 2326  Gross per 24 hour   Intake 2.33 ml   Output 300 ml   Net -297.67 ml        Restraints: No    Significant Dates:  Post Op Date: N/A  Rescue Date: N/A  Imaging/ Diagnostics:  "N/A    Noteworthy Labs:      COVID Test: (--)  CBC/Anemia Labs: Coags:    Recent Labs   Lab 10/20/23  0815 10/21/23  0516   WBC 8.82 7.55   HGB 10.9* 10.5*   HCT 35.9* 35.3*    159   MCV 90 93   RDW 14.7* 14.7*    No results for input(s): "PT", "INR", "APTT" in the last 168 hours.     Chemistries:   Recent Labs   Lab 10/20/23  0815      K 4.6   CL 96   CO2 30*   BUN 23   CREATININE 0.8   CALCIUM 9.4   PROT 7.2   BILITOT 0.4   ALKPHOS 100   ALT 21   AST 18        Cardiac Enzymes: Ejection Fractions:    Recent Labs     10/20/23  0815   TROPONINI 0.051*    EF   Date Value Ref Range Status   03/20/2023 60 % Final        POCT Glucose: HbA1c:    No results for input(s): "POCTGLUCOSE" in the last 168 hours. Hemoglobin A1C   Date Value Ref Range Status   08/29/2023 5.8 (H) 4.0 - 5.6 % Final     Comment:     ADA Screening Guidelines:  5.7-6.4%  Consistent with prediabetes  >or=6.5%  Consistent with diabetes    High levels of fetal hemoglobin interfere with the HbA1C  assay. Heterozygous hemoglobin variants (HbS, HgC, etc)do  not significantly interfere with this assay.   However, presence of multiple variants may affect accuracy.             ICU LOS 13h  Level of Care: Critical Care    Chart Check: 12 HR Done  Shift Summary/Plan for the shift: Attempt to wean oxygen   "

## 2023-10-21 NOTE — CONSULTS
[facility] - Intensive Care  Adult Nutrition  Consult Note    SUMMARY     Recommendations  1) Initiate Isosource 1.5 @ 10 mL adv by 10 mL q4-6h until goal rate of 55 mL/hr is met.     Hold for residuals > 400 mL.    mL H20 q6h, additional fluids to be determined by MD     Provides 1980 kcal, 90 g P, 232 g C, 1320 mL from formula (2520 mL total H20)         2) If bolus: resume 5 cartons/day of Isosource 1.5   - increase to 6 cartons/day as tolerated + 100 mL flush before and after each bolus    Provides 1875 kcal, 85 g protein, and 1200 mL free water        3) Monitor weights    4) In-house RD to f/u for NFPE assessement as warranted    Goals:   Pt will be able to tolerate advancement of TF regimen by RD follow up  Nutrition Goal Status: new  Communication of RD Recs:  (POC)    Assessment and Plan  Nutrition Problem  Inadequate enteral nutrition infusion    Related to (etiology):   Inadequate energy and protein intake    Signs and Symptoms (as evidenced by):   Unintentional weight loss of 16% in 6 months    Interventions (treatment strategy):  Modify rate of EN  Collaboration with other providers    Nutrition Diagnosis Status:   New      Malnutrition Assessment             Weight Loss (Malnutrition): greater than 10% in 6 months (16%)                         Reason for Assessment    Reason For Assessment: consult, new tube feeding  Diagnosis:  (Acute hypoxemic respiratory failure)  Relevant Medical History:   Patient Active Problem List   Diagnosis    Peripheral vascular disease    Carotid stenosis    Squamous cell cancer of tongue    Coronary artery disease involving native coronary artery of native heart with unstable angina pectoris    Primary hypertension    Other hyperlipidemia    Impaired mobility and ADLs    Tracheobronchitis    Toxic effect of tobacco cigarette, intentional self-harm    COPD exacerbation    Acute blood loss anemia    Secondary malignant neoplasm of cervical lymph node    Severe  "protein-calorie malnutrition    Dysphagia    Aspiration pneumonia    ACP (advance care planning)    Pulmonary infiltrate    Bloody sputum    Tracheostomy present    PEG (percutaneous endoscopic gastrostomy) status    Acute tracheitis    Gastrointestinal hemorrhage with melena    Hemoptysis    Anxiety    Retroperitoneal hematoma    Pleural effusion    Postprocedural pneumothorax    Aortic root dilatation    Acute hypoxemic respiratory failure    Acute on chronic diastolic heart failure     Interdisciplinary Rounds: did not attend  General Information Comments: pt with medical dx of SCC of tongue s/p total glossectomy, bilateral neck dissection, trach, and PEG 22. NPO. Pt on home TF of Isosource 1.5 bolus 6 cartons/day. Per previous RD note, pt will not always take 6 cartons, is usually only 5/day d/t feeling too full. -150 with weight change over the past 6 months. DAVID. Abdirahman 16- neck, back. LACY NFPE RD remote, in-house RD to f/u for full assessment.  Nutrition Discharge Planning: dc on home TF regimen of at least 5 cans of Isosource 1.5 daily    Nutrition Risk Screen    Nutrition Risk Screen: tube feeding or parenteral nutrition, unintentional loss of 10 lbs or more in the past 2 months    Nutrition/Diet History    Factors Affecting Nutritional Intake: NPO, altered gastrointestinal function, chewing difficulties/inability to chew food, difficulty/impaired swallowing  Nutrition Support Formula Prior to Admit: Isosource 1.5  Nutrtion Support Frequency Prior to Admit: 5-6 cartons/day    Anthropometrics    Temp: 98 °F (36.7 °C)  Height Method: Stated  Height: 5' 8" (172.7 cm)  Height (inches): 68 in  Weight Method: Bed Scale  Weight: 59.3 kg (130 lb 11.7 oz)  Weight (lb): 130.73 lb  Ideal Body Weight (IBW), Male: 154 lb  % Ideal Body Weight, Male (lb): 84.89 %  BMI (Calculated): 19.9  BMI Grade: 18.5-24.9 - normal  Weight Loss: unintentional  Usual Body Weight (UBW), k.2 kg  % Usual Body Weight: " 87.13  % Weight Change From Usual Weight: -13.05 %       Lab/Procedures/Meds    Pertinent Labs Reviewed: reviewed  CBC:  Recent Labs   Lab 10/21/23  0516   WBC 7.55   HGB 10.5*   HCT 35.3*        CMP:  Recent Labs   Lab 10/21/23  0516   CALCIUM 9.2   ALBUMIN 3.0*   PROT 7.2   *   K 4.3   CO2 31*   CL 96   BUN 26*   CREATININE 0.9   ALKPHOS 88   ALT 16   AST 18   BILITOT 0.6     Pertinent Medications Reviewed: reviewed  Scheduled Meds:   albuterol-ipratropium  3 mL Nebulization Q6H WAKE    aspirin  81 mg Per G Tube Daily    atorvastatin  40 mg Per G Tube QHS    doxycycline  100 mg Per G Tube Q12H    FLUoxetine  20 mg Per G Tube Daily    folic acid  1 mg Per G Tube Daily    furosemide (LASIX) injection  20 mg Intravenous BID    glycopyrrolate  1 mg Per G Tube TID    metoprolol tartrate  12.5 mg Per G Tube BID    mupirocin   Nasal BID    pantoprazole  40 mg Intravenous Daily    piperacillin-tazobactam (Zosyn) IV (PEDS and ADULTS) (extended infusion is not appropriate)  4.5 g Intravenous Q8H    polyethylene glycol  17 g Oral Daily    predniSONE  40 mg Per G Tube Daily    sodium chloride 3%  4 mL Nebulization BID     Continuous Infusions:   sodium chloride 0.9% Stopped (10/21/23 0811)     PRN Meds:.bisacodyL, hydrOXYzine HCL, ondansetron, sodium chloride 0.9%, sodium chloride 0.9%      Estimated/Assessed Needs    Weight Used For Calorie Calculations: 59.3 kg (130 lb 11.7 oz)  Energy Calorie Requirements (kcal): 2075  Energy Need Method: Kcal/kg (35)  Protein Requirements: 89 g (1.5 g/kg)  Weight Used For Protein Calculations: 59.3 kg (130 lb 11.7 oz)  Fluid Requirements (mL): 1 mL/kcal  Estimated Fluid Requirement Method:  (or per MD)  RDA Method (mL): 2075         Nutrition Prescription Ordered    Current Diet Order: NPO    Evaluation of Received Nutrient/Fluid Intake    I/O: - 825 mL since admit  Energy Calories Required: not meeting needs  Protein Required: not meeting needs  Fluid Required: not meeting  needs  Comments: LBM: 10/18/23  Tolerance:  (monitoring)  % Intake of Estimated Energy Needs: 0 - 25 %  % Meal Intake: NPO    Intake/Output - Last 3 Shifts         10/19 0700  10/20 0659 10/20 0700  10/21 0659 10/21 0700  10/22 0659    I.V. (mL/kg)  0.1 (0) 31.4 (0.5)    IV Piggyback  2.2 240.7    Total Intake(mL/kg)  2.3 (0) 272.1 (4.6)    Urine (mL/kg/hr)  1100     Total Output  1100     Net  -1097.7 +272.1                   Nutrition Risk    Level of Risk/Frequency of Follow-up: high       Monitor and Evaluation    Food and Nutrient Intake: energy intake, enteral nutrition intake  Food and Nutrient Adminstration: enteral and parenteral nutrition administration  Physical Activity and Function: nutrition-related ADLs and IADLs  Anthropometric Measurements: weight, weight change  Biochemical Data, Medical Tests and Procedures: electrolyte and renal panel, gastrointestinal profile, glucose/endocrine profile, inflammatory profile, lipid profile       Nutrition Follow-Up    RD Follow-up?: Yes    Shahla Londono RDN, STEVANN

## 2023-10-21 NOTE — HOSPITAL COURSE
10/21:  Breathing is better after fair diuresis yesterday. As per Respiratory, SpO2 is better with nasal cannula oxygen (even at just 2-3 lpm) than by trach collar.  Current trach is uncuffed 7.5 mm inner diameter replaced in clinic by Dr. Smalls (ENT) earlier this month.  Not much wheezing noted today on auscultation.    10/22:  Continues to breathe more easily.  Now on trach collar with oxygen at 8 lpm (35% FiO2) -- home oxygen is 5 lpm.  Fluid balance is -1.8 liters total with modest bump in BUN/Creat to 41/1.1.  Now on once daily furosemide and oral prednisone.  Sputum culture shows presumed pseudomonas species; this is similar to past cultures, so I suspect this is colonization rather than infection.  Would continue to wean oxygen to baseline of 5 lpm.  He should be okay to stepdown from ICU.

## 2023-10-21 NOTE — PROGRESS NOTES
[facility] Medicine  Progress Note    Patient Name: Fareed Richard Jr.  MRN: [id number]  Patient Class: IP- Inpatient   Admission Date: 10/20/2023  Length of Stay: 1 days  Attending Physician: Nikita Castillo III, MD  Primary Care Provider: Kodi Tubbs MD        Subjective:     Principal Problem:Acute hypoxemic respiratory failure        HPI:  73M with pmh of COPD, chronic diastolic failure, HTN and SCC of the tongue s/p glossectomy and flap w/tracheostomy presenting with shortness of breath for 1 week prior that acutely worsened on the morning of admission. Per independent historian, EMS, patient's SpO2 was 88-91% on arrival. Patient's baseline is 88-94%. 82% in exam room. Patient is on home O2 of 6L with tracheostomy collar, but oxygen requirement continued to increase in the ed up to needing vapotherm and trach collar for adequate oxygenation.       Overview/Hospital Course:  No notes on file    Pt states he is feeling fine sob improved wants to try nc rather than vapotherm for oxygen. No cp or fever    Physical Exam  Vitals reviewed.   Constitutional:       General: He is not in acute distress.     Appearance: He is ill-appearing and toxic-appearing.   HENT:      Head: Normocephalic and atraumatic.      Mouth/Throat:      Mouth: Mucous membranes are dry.      Pharynx: Oropharynx is clear.   Neck:      Comments: Trach in place with trach collar for oxygenation  Cardiovascular:      Rate and Rhythm: Normal rate and regular rhythm.   Pulmonary:      Effort: Pulmonary effort is normal. No respiratory distress.      Comments: Upper airway crackling noted.  Abdominal:      Palpations: Abdomen is soft.   Musculoskeletal:         General: No swelling or tenderness.      Cervical back: Neck supple. No rigidity.   Skin:     General: Skin is warm and dry.   Neurological:      General: No focal deficit present.       Assessment/Plan:      * Acute hypoxemic respiratory failure  Patient with  Hypoxic Respiratory failure which is Acute.  he is not on home oxygen. Supplemental oxygen was provided and noted- Oxygen Concentration (%):  [30-40] 35    .   Signs/symptoms of respiratory failure include- increased work of breathing and respiratory distress. Contributing diagnoses includes - CHF, COPD and Pneumonia Labs and images were reviewed. Patient Has recent ABG, which has been reviewed. Will treat underlying causes and adjust management of respiratory failure as follows-     -started on abx for possible pna with doxycycline and zosyn  -continue steroids  -attempts diuresis as tolerated and weaning o2  -pulmonology consulted.    Acute on chronic diastolic heart failure  Attempting lasix for diuresis      PEG (percutaneous endoscopic gastrostomy) status  noted      Tracheostomy present  With pretty significant secretions noted.    -pulmonology consulted    ACP (advance care planning)  Advance Care Planning     Date: 10/20/2023  Pt wants to be full code at this time. Has home palliative. Palliative care following. 3 minutes spent in acp conversations          COPD exacerbation  tx as stated above      Primary hypertension  Restarting home medications. Currently controlled      Coronary artery disease involving native coronary artery of native heart with unstable angina pectoris  Continue home medications.      Squamous cell cancer of tongue  noted        VTE Risk Mitigation (From admission, onward)         Ordered     IP VTE HIGH RISK PATIENT  Once         10/20/23 1404     Place sequential compression device  Until discontinued         10/20/23 1404     Place sequential compression device  Until discontinued         10/20/23 1214                Discharge Planning   NISA:      Code Status: Full Code   Is the patient medically ready for discharge?:     Reason for patient still in hospital (select all that apply): Treatment and Consult recommendations  Discharge Plan A: Home with family            Critical care  time spent on the evaluation and treatment of severe organ dysfunction, review of pertinent labs and imaging studies, discussions with consulting providers and discussions with patient/family: 40 minutes.      Nikita Castillo III, MD  Department of Hospital Medicine   [facility] - Intensive Care

## 2023-10-21 NOTE — PLAN OF CARE
Problem: Adult Inpatient Plan of Care  Goal: Plan of Care Review  Outcome: Ongoing, Progressing  Goal: Patient-Specific Goal (Individualized)  Outcome: Ongoing, Progressing  Goal: Absence of Hospital-Acquired Illness or Injury  Outcome: Ongoing, Progressing  Goal: Optimal Comfort and Wellbeing  Outcome: Ongoing, Progressing  Goal: Readiness for Transition of Care  Outcome: Ongoing, Progressing     Problem: Adjustment to Illness COPD (Chronic Obstructive Pulmonary Disease)  Goal: Optimal Chronic Illness Coping  Outcome: Ongoing, Progressing     Problem: Functional Ability Impaired COPD (Chronic Obstructive Pulmonary Disease)  Goal: Optimal Level of Functional [place]  Outcome: Ongoing, Progressing     Problem: Infection COPD (Chronic Obstructive Pulmonary Disease)  Goal: Absence of Infection Signs and Symptoms  Outcome: Ongoing, Progressing     Problem: Oral Intake Inadequate COPD (Chronic Obstructive Pulmonary Disease)  Goal: Improved Nutrition Intake  Outcome: Ongoing, Progressing     Problem: Respiratory Compromise COPD (Chronic Obstructive Pulmonary Disease)  Goal: Effective Oxygenation and Ventilation  Outcome: Ongoing, Progressing     Problem: Coping Ineffective  Goal: Effective Coping  Outcome: Ongoing, Progressing     Problem: Skin Injury Risk Increased  Goal: Skin Health and Integrity  Outcome: Ongoing, Progressing

## 2023-10-21 NOTE — NURSING
12 Hour chart check complete. No new orders. Pt remains in ICU. Pt stable. Vital signs are WDL. Pt has no c/o pain or discomfort at this time. Close monitoring ongoing

## 2023-10-21 NOTE — NURSING
[facility] [place]  Nurses Note -- 4 Eyes      10/21/2023       Skin assessed on: Transfer care      [x] No Pressure Injuries Present    [x]Prevention Measures Documented    [] Yes LDA  for Pressure Injury Previously documented     [] Yes New Pressure Injury Discovered   [] LDA for New Pressure Injury Added      Attending RN:  Jonn Garza, RN     Second RN:  Mariella Menjivar RN

## 2023-10-21 NOTE — PLAN OF CARE
Recommendations  1) Initiate Isosource 1.5 @ 10 mL adv by 10 mL q4-6h until goal rate of 55 mL/hr is met.     Hold for residuals > 400 mL.    mL H20 q6h, additional fluids to be determined by MD     Provides 1980 kcal, 90 g P, 232 g C, 1320 mL from formula (2520 mL total H20)         2) If bolus: resume 5 cartons/day of Isosource 1.5   - increase to 6 cartons/day as tolerated + 100 mL flush before and after each bolus    Provides 1875 kcal, 85 g protein, and 1200 mL free water        3) Monitor weights    4) In-house RD to f/u for NFPE assessement as warranted    Goals:   Pt will be able to tolerate advancement of TF regimen by RD follow up  Nutrition Goal Status: new  Communication of RD Recs:  (POC)

## 2023-10-21 NOTE — NURSING
[facility] [place]  Nurses Note -- 4 Eyes      10/21/2023       Skin assessed on: Admit      [x] No Pressure Injuries Present    [x]Prevention Measures Documented  Blanchable redness noted to sacrum    [] Yes LDA  for Pressure Injury Previously documented     [] Yes New Pressure Injury Discovered   [] LDA for New Pressure Injury Added      Attending RN:  Mariella Menjivar RN     Second RN:  Aretha Wade RN

## 2023-10-21 NOTE — NURSING
[facility] Intensive Care  ICU Shift Summary  Date: 10/21/2023      Prehospitalization: otherSNF  Admit Date / LOS : 10/20/2023/ 1 days    Diagnosis: Acute hypoxemic respiratory failure    Consults:        Active: Palliative and Pulm CC       Needed: N/A     Code Status: Full Code   Advanced Directive: Not Received    LDA:  Lines/Drains/Airways       Drain  Duration                  Gastrostomy/Enterostomy 01/04/22 Percutaneous endoscopic gastrostomy (PEG)  days    Male External Urinary Catheter 10/20/23 2100 <1 day              Airway  Duration             Adult Surgical Airway 03/16/23 1014 Shiley Cuffed 6.0 219 days              Peripheral Intravenous Line  Duration                  Peripheral IV - Single Lumen 10/20/23 0817 20 G Anterior;Left Forearm 1 day         Peripheral IV - Single Lumen 10/20/23 1557 20 G Anterior;Right Forearm 1 day                  Central Lines/Site/Justification:Patient Does Not Have Central Line  Urinary Cath/Order/Justification:Patient Does Not Have Urinary Catheter    Vasopressors/Infusions:    sodium chloride 0.9% 5 mL/hr at 10/21/23 1501          GOALS: Volume/ Hemodynamic: N/A                     RASS: N/A    Pain Management: NA       Pain Controlled: not applicable     Rhythm: NSR    Respiratory Device: Vapotherm    Oxygen Concentration (%):  [30-35] 35                 Most Recent SBT/ SAT: N/A       MOVE Screen: NA  ICU Liberation: not applicable    VTE Prophylaxis: Mechanical  Mobility: A: Assist  Stress Ulcer Prophylaxis: Yes    Isolation: No active isolations    Dietary:   Current Diet Order   Procedures    Diet NPO      Tolerance: yes  Advancement: yes    I & O (24h):    Intake/Output Summary (Last 24 hours) at 10/21/2023 1641  Last data filed at 10/21/2023 1600  Gross per 24 hour   Intake 341.05 ml   Output 1700 ml   Net -1358.95 ml        Restraints: No    Significant Dates:  Post Op Date: N/A  Rescue Date: N/A  Imaging/ Diagnostics: N/A    Noteworthy Labs:   "none    COVID Test: (--)  CBC/Anemia Labs: Coags:    Recent Labs   Lab 10/20/23  0815 10/21/23  0516   WBC 8.82 7.55   HGB 10.9* 10.5*   HCT 35.9* 35.3*    159   MCV 90 93   RDW 14.7* 14.7*    No results for input(s): "PT", "INR", "APTT" in the last 168 hours.     Chemistries:   Recent Labs   Lab 10/20/23  0815 10/21/23  0516    135*   K 4.6 4.3   CL 96 96   CO2 30* 31*   BUN 23 26*   CREATININE 0.8 0.9   CALCIUM 9.4 9.2   PROT 7.2 7.2   BILITOT 0.4 0.6   ALKPHOS 100 88   ALT 21 16   AST 18 18   MG  --  2.0   PHOS  --  4.0        Cardiac Enzymes: Ejection Fractions:    Recent Labs     10/20/23  0815   TROPONINI 0.051*    EF   Date Value Ref Range Status   03/20/2023 60 % Final        POCT Glucose: HbA1c:    No results for input(s): "POCTGLUCOSE" in the last 168 hours. Hemoglobin A1C   Date Value Ref Range Status   08/29/2023 5.8 (H) 4.0 - 5.6 % Final     Comment:     ADA Screening Guidelines:  5.7-6.4%  Consistent with prediabetes  >or=6.5%  Consistent with diabetes    High levels of fetal hemoglobin interfere with the HbA1C  assay. Heterozygous hemoglobin variants (HbS, HgC, etc)do  not significantly interfere with this assay.   However, presence of multiple variants may affect accuracy.             ICU LOS 1d  Level of Care: Critical Care    Chart Check: 12 HR Done  Shift Summary/Plan for the shift: The patient is at 30% O2 now. He also started tube feeding at 10 ml/hr today, and the rate will increase 10 ml every 6 hours.    "

## 2023-10-21 NOTE — CARE UPDATE
10/21/23 0830   PRE-TX-O2   Device (Oxygen Therapy) (S)  tracheostomy collar  (RT attempted to transition to Trach collar 10L 60% only. about 15 minutes later, Pt. desat to 89% and also requested to be placed back on the cannula (Vapotherm). Rn notified.)

## 2023-10-22 LAB
ALBUMIN SERPL BCP-MCNC: 3.1 G/DL (ref 3.5–5.2)
ALP SERPL-CCNC: 83 U/L (ref 55–135)
ALT SERPL W/O P-5'-P-CCNC: 17 U/L (ref 10–44)
ANION GAP SERPL CALC-SCNC: 12 MMOL/L (ref 8–16)
AST SERPL-CCNC: 17 U/L (ref 10–40)
BASOPHILS # BLD AUTO: 0.02 K/UL (ref 0–0.2)
BASOPHILS NFR BLD: 0.3 % (ref 0–1.9)
BILIRUB SERPL-MCNC: 0.4 MG/DL (ref 0.1–1)
BUN SERPL-MCNC: 41 MG/DL (ref 8–23)
CALCIUM SERPL-MCNC: 9.4 MG/DL (ref 8.7–10.5)
CHLORIDE SERPL-SCNC: 94 MMOL/L (ref 95–110)
CO2 SERPL-SCNC: 31 MMOL/L (ref 23–29)
CREAT SERPL-MCNC: 1.1 MG/DL (ref 0.5–1.4)
DIFFERENTIAL METHOD: ABNORMAL
EOSINOPHIL # BLD AUTO: 0.1 K/UL (ref 0–0.5)
EOSINOPHIL NFR BLD: 1 % (ref 0–8)
ERYTHROCYTE [DISTWIDTH] IN BLOOD BY AUTOMATED COUNT: 14.6 % (ref 11.5–14.5)
EST. GFR  (NO RACE VARIABLE): >60 ML/MIN/1.73 M^2
GLUCOSE SERPL-MCNC: 151 MG/DL (ref 70–110)
HCT VFR BLD AUTO: 39.6 % (ref 40–54)
HGB BLD-MCNC: 11.9 G/DL (ref 14–18)
IMM GRANULOCYTES # BLD AUTO: 0.03 K/UL (ref 0–0.04)
IMM GRANULOCYTES NFR BLD AUTO: 0.4 % (ref 0–0.5)
LYMPHOCYTES # BLD AUTO: 0.6 K/UL (ref 1–4.8)
LYMPHOCYTES NFR BLD: 9 % (ref 18–48)
MAGNESIUM SERPL-MCNC: 2 MG/DL (ref 1.6–2.6)
MCH RBC QN AUTO: 27.9 PG (ref 27–31)
MCHC RBC AUTO-ENTMCNC: 30.1 G/DL (ref 32–36)
MCV RBC AUTO: 93 FL (ref 82–98)
MONOCYTES # BLD AUTO: 0.6 K/UL (ref 0.3–1)
MONOCYTES NFR BLD: 9.2 % (ref 4–15)
NEUTROPHILS # BLD AUTO: 5.6 K/UL (ref 1.8–7.7)
NEUTROPHILS NFR BLD: 80.1 % (ref 38–73)
NRBC BLD-RTO: 0 /100 WBC
PHOSPHATE SERPL-MCNC: 3.9 MG/DL (ref 2.7–4.5)
PLATELET # BLD AUTO: 173 K/UL (ref 150–450)
PMV BLD AUTO: 10.4 FL (ref 9.2–12.9)
POTASSIUM SERPL-SCNC: 3.6 MMOL/L (ref 3.5–5.1)
PROT SERPL-MCNC: 7.3 G/DL (ref 6–8.4)
RBC # BLD AUTO: 4.27 M/UL (ref 4.6–6.2)
SODIUM SERPL-SCNC: 137 MMOL/L (ref 136–145)
WBC # BLD AUTO: 6.98 K/UL (ref 3.9–12.7)

## 2023-10-22 PROCEDURE — 99232 SBSQ HOSP IP/OBS MODERATE 35: CPT | Mod: ,,, | Performed by: INTERNAL MEDICINE

## 2023-10-22 PROCEDURE — 99900026 HC AIRWAY MAINTENANCE (STAT)

## 2023-10-22 PROCEDURE — 36415 COLL VENOUS BLD VENIPUNCTURE: CPT | Performed by: STUDENT IN AN ORGANIZED HEALTH CARE EDUCATION/TRAINING PROGRAM

## 2023-10-22 PROCEDURE — 25000242 PHARM REV CODE 250 ALT 637 W/ HCPCS: Performed by: STUDENT IN AN ORGANIZED HEALTH CARE EDUCATION/TRAINING PROGRAM

## 2023-10-22 PROCEDURE — 94640 AIRWAY INHALATION TREATMENT: CPT

## 2023-10-22 PROCEDURE — 84100 ASSAY OF PHOSPHORUS: CPT | Performed by: STUDENT IN AN ORGANIZED HEALTH CARE EDUCATION/TRAINING PROGRAM

## 2023-10-22 PROCEDURE — 85025 COMPLETE CBC W/AUTO DIFF WBC: CPT | Performed by: STUDENT IN AN ORGANIZED HEALTH CARE EDUCATION/TRAINING PROGRAM

## 2023-10-22 PROCEDURE — 63600175 PHARM REV CODE 636 W HCPCS: Performed by: STUDENT IN AN ORGANIZED HEALTH CARE EDUCATION/TRAINING PROGRAM

## 2023-10-22 PROCEDURE — 83735 ASSAY OF MAGNESIUM: CPT | Performed by: STUDENT IN AN ORGANIZED HEALTH CARE EDUCATION/TRAINING PROGRAM

## 2023-10-22 PROCEDURE — 21400001 HC TELEMETRY ROOM

## 2023-10-22 PROCEDURE — 99900035 HC TECH TIME PER 15 MIN (STAT)

## 2023-10-22 PROCEDURE — 25000003 PHARM REV CODE 250: Performed by: STUDENT IN AN ORGANIZED HEALTH CARE EDUCATION/TRAINING PROGRAM

## 2023-10-22 PROCEDURE — 80053 COMPREHEN METABOLIC PANEL: CPT | Performed by: STUDENT IN AN ORGANIZED HEALTH CARE EDUCATION/TRAINING PROGRAM

## 2023-10-22 PROCEDURE — 27000221 HC OXYGEN, UP TO 24 HOURS

## 2023-10-22 PROCEDURE — 94761 N-INVAS EAR/PLS OXIMETRY MLT: CPT

## 2023-10-22 PROCEDURE — 99232 PR SUBSEQUENT HOSPITAL CARE,LEVL II: ICD-10-PCS | Mod: ,,, | Performed by: INTERNAL MEDICINE

## 2023-10-22 PROCEDURE — C9113 INJ PANTOPRAZOLE SODIUM, VIA: HCPCS | Performed by: STUDENT IN AN ORGANIZED HEALTH CARE EDUCATION/TRAINING PROGRAM

## 2023-10-22 RX ORDER — FUROSEMIDE 10 MG/ML
20 INJECTION INTRAMUSCULAR; INTRAVENOUS DAILY
Status: DISCONTINUED | OUTPATIENT
Start: 2023-10-23 | End: 2023-10-24

## 2023-10-22 RX ORDER — TALC
6 POWDER (GRAM) TOPICAL NIGHTLY PRN
Status: DISCONTINUED | OUTPATIENT
Start: 2023-10-22 | End: 2023-10-28 | Stop reason: HOSPADM

## 2023-10-22 RX ADMIN — ASPIRIN 81 MG CHEWABLE TABLET 81 MG: 81 TABLET CHEWABLE at 09:10

## 2023-10-22 RX ADMIN — DOXYCYCLINE HYCLATE 100 MG: 100 TABLET, COATED ORAL at 09:10

## 2023-10-22 RX ADMIN — METOPROLOL TARTRATE 12.5 MG: 25 TABLET, FILM COATED ORAL at 09:10

## 2023-10-22 RX ADMIN — PIPERACILLIN AND TAZOBACTAM 4.5 G: 4; .5 INJECTION, POWDER, LYOPHILIZED, FOR SOLUTION INTRAVENOUS; PARENTERAL at 09:10

## 2023-10-22 RX ADMIN — SODIUM CHLORIDE SOLN NEBU 3% 4 ML: 3 NEBU SOLN at 09:10

## 2023-10-22 RX ADMIN — GLYCOPYRROLATE 1 MG: 1 TABLET ORAL at 09:10

## 2023-10-22 RX ADMIN — MELATONIN TAB 3 MG 6 MG: 3 TAB at 09:10

## 2023-10-22 RX ADMIN — PANTOPRAZOLE SODIUM 40 MG: 40 INJECTION, POWDER, FOR SOLUTION INTRAVENOUS at 09:10

## 2023-10-22 RX ADMIN — ATORVASTATIN CALCIUM 40 MG: 40 TABLET, FILM COATED ORAL at 09:10

## 2023-10-22 RX ADMIN — FOLIC ACID 1 MG: 1 TABLET ORAL at 09:10

## 2023-10-22 RX ADMIN — PIPERACILLIN AND TAZOBACTAM 4.5 G: 4; .5 INJECTION, POWDER, LYOPHILIZED, FOR SOLUTION INTRAVENOUS; PARENTERAL at 12:10

## 2023-10-22 RX ADMIN — IPRATROPIUM BROMIDE AND ALBUTEROL SULFATE 3 ML: 2.5; .5 SOLUTION RESPIRATORY (INHALATION) at 03:10

## 2023-10-22 RX ADMIN — POLYETHYLENE GLYCOL 3350 17 G: 17 POWDER, FOR SOLUTION ORAL at 09:10

## 2023-10-22 RX ADMIN — FUROSEMIDE 20 MG: 10 INJECTION, SOLUTION INTRAMUSCULAR; INTRAVENOUS at 09:10

## 2023-10-22 RX ADMIN — PREDNISONE 40 MG: 20 TABLET ORAL at 09:10

## 2023-10-22 RX ADMIN — GLYCOPYRROLATE 1 MG: 1 TABLET ORAL at 03:10

## 2023-10-22 RX ADMIN — SODIUM CHLORIDE SOLN NEBU 3% 4 ML: 3 NEBU SOLN at 07:10

## 2023-10-22 RX ADMIN — FLUOXETINE 20 MG: 20 CAPSULE ORAL at 09:10

## 2023-10-22 RX ADMIN — MUPIROCIN: 20 OINTMENT TOPICAL at 09:10

## 2023-10-22 RX ADMIN — PIPERACILLIN AND TAZOBACTAM 4.5 G: 4; .5 INJECTION, POWDER, LYOPHILIZED, FOR SOLUTION INTRAVENOUS; PARENTERAL at 03:10

## 2023-10-22 RX ADMIN — IPRATROPIUM BROMIDE AND ALBUTEROL SULFATE 3 ML: 2.5; .5 SOLUTION RESPIRATORY (INHALATION) at 09:10

## 2023-10-22 NOTE — SUBJECTIVE & OBJECTIVE
Interval History: 10/22:  Continues to breathe more easily.  Now on trach collar with oxygen at 8 lpm (35% FiO2) -- home oxygen is 5 lpm.  Fluid balance is -1.8 liters total with modest bump in BUN/Creat to 41/1.1.  Now on once daily furosemide and oral prednisone.  Sputum culture shows presumed pseudomonas species; this is similar to past cultures, so I suspect this is colonization rather than infection.  Would continue to wean oxygen to baseline of 5 lpm.  He should be okay to stepdown from ICU.    Objective:     Vital Signs (Most Recent):  Temp: 98.5 °F (36.9 °C) (10/22/23 1105)  Pulse: 74 (10/22/23 1200)  Resp: 20 (10/22/23 1200)  BP: 116/69 (10/22/23 1200)  SpO2: 97 % (10/22/23 1200) Vital Signs (24h Range):  Temp:  [97.9 °F (36.6 °C)-98.5 °F (36.9 °C)] 98.5 °F (36.9 °C)  Pulse:  [64-89] 74  Resp:  [17-38] 20  SpO2:  [91 %-99 %] 97 %  BP: ()/(52-82) 116/69     Weight: 59.3 kg (130 lb 11.7 oz)  Body mass index is 19.88 kg/m².      Intake/Output Summary (Last 24 hours) at 10/22/2023 1347  Last data filed at 10/22/2023 1300  Gross per 24 hour   Intake 1313.1 ml   Output 2250 ml   Net -936.9 ml        Physical Exam  Nursing note reviewed.   Neck:      Comments: Tracheostomy in place.  Cardiovascular:      Rate and Rhythm: Normal rate and regular rhythm.      Heart sounds: Normal heart sounds. No murmur heard.     No gallop.   Pulmonary:      Effort: Pulmonary effort is normal.      Breath sounds: Rales (Scattered bilateral lower lobe crackles) present. No wheezing or rhonchi.   Musculoskeletal:      Right lower leg: No edema.      Left lower leg: No edema.   Neurological:      Mental Status: Mental status is at baseline.           Review of Systems    Vents:  Oxygen Concentration (%): 35 (10/22/23 1133)    Lines/Drains/Airways       Drain  Duration                  Gastrostomy/Enterostomy 01/04/22 Percutaneous endoscopic gastrostomy (PEG)  days    Male External Urinary Catheter 10/20/23 2100 1 day               Airway  Duration             Adult Surgical Airway 03/16/23 1014 Shiley Cuffed 6.0 220 days              Peripheral Intravenous Line  Duration                  Peripheral IV - Single Lumen 10/20/23 0817 20 G Anterior;Left Forearm 2 days         Peripheral IV - Single Lumen 10/20/23 1557 20 G Anterior;Right Forearm 1 day                    Significant Labs:    CBC/Anemia Profile:  Recent Labs   Lab 10/21/23  0516 10/22/23  0511   WBC 7.55 6.98   HGB 10.5* 11.9*   HCT 35.3* 39.6*    173   MCV 93 93   RDW 14.7* 14.6*        Chemistries:  Recent Labs   Lab 10/21/23  0516 10/22/23  0511   * 137   K 4.3 3.6   CL 96 94*   CO2 31* 31*   BUN 26* 41*   CREATININE 0.9 1.1   CALCIUM 9.2 9.4   ALBUMIN 3.0* 3.1*   PROT 7.2 7.3   BILITOT 0.6 0.4   ALKPHOS 88 83   ALT 16 17   AST 18 17   MG 2.0 2.0   PHOS 4.0 3.9       All pertinent labs within the past 24 hours have been reviewed.    Significant Imaging:  I have reviewed all pertinent imaging results/findings within the past 24 hours.

## 2023-10-22 NOTE — NURSING
[facility] [place]  Nurses Note -- 4 Eyes      10/22/2023       Skin assessed on: Q Shift      [x] No Pressure Injuries Present    [x]Prevention Measures Documented    [] Yes LDA  for Pressure Injury Previously documented     [] Yes New Pressure Injury Discovered   [] LDA for New Pressure Injury Added      Attending RN:  Jonn Garza, RN     Second RN:  Lyn Mendez RN

## 2023-10-22 NOTE — NURSING
[facility] [place]  Nurses Note -- 4 Eyes      10/22/2023       Skin assessed on: Q Shift      [x] No Pressure Injuries Present    [x]Prevention Measures Documented    [] Yes LDA  for Pressure Injury Previously documented     [] Yes New Pressure Injury Discovered   [] LDA for New Pressure Injury Added      Attending RN:  LAUREEN Mendez RN       Second RN:  RONI BLACKBURN RN

## 2023-10-22 NOTE — NURSING
[facility] Intensive Care  ICU Shift Summary  Date: 10/22/2023      Prehospitalization: Home  Admit Date / LOS : 10/20/2023/ 2 days    Diagnosis: Acute hypoxemic respiratory failure    Consults:        Active: Palliative and Pulm CC       Needed: N/A     Code Status: Full Code   Advanced Directive: Not Received    LDA:  Lines/Drains/Airways       Drain  Duration                  Gastrostomy/Enterostomy 01/04/22 Percutaneous endoscopic gastrostomy (PEG)  days    Male External Urinary Catheter 10/20/23 2100 1 day              Airway  Duration             Adult Surgical Airway 03/16/23 1014 Shiley Cuffed 6.0 219 days              Peripheral Intravenous Line  Duration                  Peripheral IV - Single Lumen 10/20/23 0817 20 G Anterior;Left Forearm 1 day         Peripheral IV - Single Lumen 10/20/23 1557 20 G Anterior;Right Forearm 1 day                  Central Lines/Site/Justification:Patient Does Not Have Central Line  Urinary Cath/Order/Justification:Patient Does Not Have Urinary Catheter    Vasopressors/Infusions:    sodium chloride 0.9% 5 mL/hr at 10/21/23 1501          GOALS: Volume/ Hemodynamic: N/A                     RASS: N/A    Pain Management: none       Pain Controlled: yes     Rhythm: NSR    Respiratory Device: Nasal Cannula and Venti Mask    Oxygen Concentration (%):  [30-35] 35                 Most Recent SBT/ SAT: N/A       MOVE Screen: PASS  ICU Liberation: no    VTE Prophylaxis: Mechanical  Mobility: OOB to Chair  Stress Ulcer Prophylaxis: No    Isolation: No active isolations    Dietary:   Current Diet Order   Procedures    Diet NPO      Tolerance: yes  Advancement: yes    I & O (24h):    Intake/Output Summary (Last 24 hours) at 10/22/2023 0622  Last data filed at 10/22/2023 0600  Gross per 24 hour   Intake 1127.91 ml   Output 1400 ml   Net -272.09 ml        Restraints: No    Significant Dates:  Post Op Date: N/A  Rescue Date: N/A  Imaging/ Diagnostics: N/A    Noteworthy Labs:   "none    COVID Test: (--)  CBC/Anemia Labs: Coags:    Recent Labs   Lab 10/21/23  0516 10/22/23  0511   WBC 7.55 6.98   HGB 10.5* 11.9*   HCT 35.3* 39.6*    173   MCV 93 93   RDW 14.7* 14.6*    No results for input(s): "PT", "INR", "APTT" in the last 168 hours.     Chemistries:   Recent Labs   Lab 10/21/23  0516 10/22/23  0511   * 137   K 4.3 3.6   CL 96 94*   CO2 31* 31*   BUN 26* 41*   CREATININE 0.9 1.1   CALCIUM 9.2 9.4   PROT 7.2 7.3   BILITOT 0.6 0.4   ALKPHOS 88 83   ALT 16 17   AST 18 17   MG 2.0 2.0   PHOS 4.0 3.9        Cardiac Enzymes: Ejection Fractions:    Recent Labs     10/20/23  0815   TROPONINI 0.051*    EF   Date Value Ref Range Status   03/20/2023 60 % Final        POCT Glucose: HbA1c:    No results for input(s): "POCTGLUCOSE" in the last 168 hours. Hemoglobin A1C   Date Value Ref Range Status   08/29/2023 5.8 (H) 4.0 - 5.6 % Final     Comment:     ADA Screening Guidelines:  5.7-6.4%  Consistent with prediabetes  >or=6.5%  Consistent with diabetes    High levels of fetal hemoglobin interfere with the HbA1C  assay. Heterozygous hemoglobin variants (HbS, HgC, etc)do  not significantly interfere with this assay.   However, presence of multiple variants may affect accuracy.             ICU LOS 1d 14h  Level of Care: Critical Care    Chart Check: 12 HR Done  Shift Summary/Plan for the shift: none    "

## 2023-10-22 NOTE — NURSING
[facility] [place] [facility]  [place] Shift Summary  Date: 10/22/2023      Prehospitalization: Home  Admit Date / LOS : 10/20/2023/ 2 days    Diagnosis: Acute hypoxemic respiratory failure    Consults:        Active: Palliative and Pulm CC       Needed: N/A     Code Status: Full Code   Advanced Directive: Not Received    LDA:  Lines/Drains/Airways       Drain  Duration                  Gastrostomy/Enterostomy 01/04/22 Percutaneous endoscopic gastrostomy (PEG)  days    Male External Urinary Catheter 10/20/23 2100 1 day              Airway  Duration             Adult Surgical Airway 03/16/23 1014 Shiley Cuffed 6.0 220 days              Peripheral Intravenous Line  Duration                  Peripheral IV - Single Lumen 10/20/23 0817 20 G Anterior;Left Forearm 2 days         Peripheral IV - Single Lumen 10/20/23 1557 20 G Anterior;Right Forearm 2 days                  Central Lines/Site/Justification:Patient Does Not Have Central Line  Urinary Cath/Order/Justification:Patient Does Not Have Urinary Catheter    Vasopressors/Infusions:    sodium chloride 0.9% 5 mL/hr at 10/21/23 1501          GOALS: Volume/ Hemodynamic: N/A                     RASS: N/A    Pain Management: PO       Pain Controlled: yes     Rhythm: NSR    Respiratory Device: Nasal Cannula  trach collar  Oxygen Concentration (%):  [35] 35                 Most Recent SBT/ SAT: N/A       MOVE Screen: NA  ICU Liberation: not applicable    VTE Prophylaxis: Mechanical  Mobility: Bedrest  Stress Ulcer Prophylaxis: Yes    Isolation: No active isolations    Dietary:   Current Diet Order   Procedures    Diet NPO      Tolerance: yes  Advancement: yes    I & O (24h):    Intake/Output Summary (Last 24 hours) at 10/22/2023 1612  Last data filed at 10/22/2023 1600  Gross per 24 hour   Intake 1421.16 ml   Output 1850 ml   Net -428.84 ml        Restraints: No    Significant Dates:  Post Op Date: N/A  Rescue Date: N/A  Imaging/ Diagnostics: N/A    Noteworthy Labs:   "none    COVID Test: (--)  CBC/Anemia Labs: Coags:    Recent Labs   Lab 10/21/23  0516 10/22/23  0511   WBC 7.55 6.98   HGB 10.5* 11.9*   HCT 35.3* 39.6*    173   MCV 93 93   RDW 14.7* 14.6*    No results for input(s): "PT", "INR", "APTT" in the last 168 hours.     Chemistries:   Recent Labs   Lab 10/21/23  0516 10/22/23  0511   * 137   K 4.3 3.6   CL 96 94*   CO2 31* 31*   BUN 26* 41*   CREATININE 0.9 1.1   CALCIUM 9.2 9.4   PROT 7.2 7.3   BILITOT 0.6 0.4   ALKPHOS 88 83   ALT 16 17   AST 18 17   MG 2.0 2.0   PHOS 4.0 3.9        Cardiac Enzymes: Ejection Fractions:    Recent Labs     10/20/23  0815   TROPONINI 0.051*    EF   Date Value Ref Range Status   03/20/2023 60 % Final        POCT Glucose: HbA1c:    No results for input(s): "POCTGLUCOSE" in the last 168 hours. Hemoglobin A1C   Date Value Ref Range Status   08/29/2023 5.8 (H) 4.0 - 5.6 % Final     Comment:     ADA Screening Guidelines:  5.7-6.4%  Consistent with prediabetes  >or=6.5%  Consistent with diabetes    High levels of fetal hemoglobin interfere with the HbA1C  assay. Heterozygous hemoglobin variants (HbS, HgC, etc)do  not significantly interfere with this assay.   However, presence of multiple variants may affect accuracy.             ICU LOS 2d  Level of Care: Tele    Chart Check: 12 HR Done  Shift Summary/Plan for the shift: The patient O2 sat improved and the plan is to step down.    "

## 2023-10-22 NOTE — PLAN OF CARE
The plan is to advance tube feeding to goal 55 ml/ hr, to give lasix/ monitor in/out and to monitor O2 Sat.

## 2023-10-22 NOTE — PLAN OF CARE
Problem: Adult Inpatient Plan of Care  Goal: Plan of Care Review  Outcome: Ongoing, Progressing  Goal: Patient-Specific Goal (Individualized)  Outcome: Ongoing, Progressing  Goal: Absence of Hospital-Acquired Illness or Injury  Outcome: Ongoing, Progressing  Goal: Optimal Comfort and Wellbeing  Outcome: Ongoing, Progressing  Goal: Readiness for Transition of Care  Outcome: Ongoing, Progressing     Problem: Adjustment to Illness COPD (Chronic Obstructive Pulmonary Disease)  Goal: Optimal Chronic Illness Coping  Outcome: Ongoing, Progressing     Problem: Functional Ability Impaired COPD (Chronic Obstructive Pulmonary Disease)  Goal: Optimal Level of Functional [place]  Outcome: Ongoing, Progressing     Problem: Infection COPD (Chronic Obstructive Pulmonary Disease)  Goal: Absence of Infection Signs and Symptoms  Outcome: Ongoing, Progressing     Problem: Oral Intake Inadequate COPD (Chronic Obstructive Pulmonary Disease)  Goal: Improved Nutrition Intake  Outcome: Ongoing, Progressing     Problem: Respiratory Compromise COPD (Chronic Obstructive Pulmonary Disease)  Goal: Effective Oxygenation and Ventilation  Outcome: Ongoing, Progressing     Problem: Coping Ineffective  Goal: Effective Coping  Outcome: Ongoing, Progressing     Problem: Skin Injury Risk Increased  Goal: Skin Health and Integrity  Outcome: Ongoing, Progressing

## 2023-10-22 NOTE — PROGRESS NOTES
[place] - Intensive Care  Pulmonology  Progress Note    Patient Name: Fareed Richard Jr.  MRN: [id number]  Admission Date: 10/20/2023  Hospital Length of Stay: 2 days  Code Status: Full Code  Attending Provider: Nikita Castillo III, MD  Primary Care Provider: Kodi Tubbs MD   Principal Problem: Acute hypoxemic respiratory failure    Subjective:     Interval History: 10/22:  Continues to breathe more easily.  Now on trach collar with oxygen at 8 lpm (35% FiO2) -- home oxygen is 5 lpm.  Fluid balance is -1.8 liters total with modest bump in BUN/Creat to 41/1.1.  Now on once daily furosemide and oral prednisone.  Sputum culture shows presumed pseudomonas species; this is similar to past cultures, so I suspect this is colonization rather than infection.  Would continue to wean oxygen to baseline of 5 lpm.  He should be okay to stepdown from ICU.    Objective:     Vital Signs (Most Recent):  Temp: 98.5 °F (36.9 °C) (10/22/23 1105)  Pulse: 74 (10/22/23 1200)  Resp: 20 (10/22/23 1200)  BP: 116/69 (10/22/23 1200)  SpO2: 97 % (10/22/23 1200) Vital Signs (24h Range):  Temp:  [97.9 °F (36.6 °C)-98.5 °F (36.9 °C)] 98.5 °F (36.9 °C)  Pulse:  [64-89] 74  Resp:  [17-38] 20  SpO2:  [91 %-99 %] 97 %  BP: ()/(52-82) 116/69     Weight: 59.3 kg (130 lb 11.7 oz)  Body mass index is 19.88 kg/m².      Intake/Output Summary (Last 24 hours) at 10/22/2023 1347  Last data filed at 10/22/2023 1300  Gross per 24 hour   Intake 1313.1 ml   Output 2250 ml   Net -936.9 ml        Physical Exam  Nursing note reviewed.   Neck:      Comments: Tracheostomy in place.  Cardiovascular:      Rate and Rhythm: Normal rate and regular rhythm.      Heart sounds: Normal heart sounds. No murmur heard.     No gallop.   Pulmonary:      Effort: Pulmonary effort is normal.      Breath sounds: Rales (Scattered bilateral lower lobe crackles) present. No wheezing or rhonchi.   Musculoskeletal:      Right lower leg: No edema.      Left lower leg: No edema.    Neurological:      Mental Status: Mental status is at baseline.           Review of Systems    Vents:  Oxygen Concentration (%): 35 (10/22/23 1133)    Lines/Drains/Airways       Drain  Duration                  Gastrostomy/Enterostomy 01/04/22 Percutaneous endoscopic gastrostomy (PEG)  days    Male External Urinary Catheter 10/20/23 2100 1 day              Airway  Duration             Adult Surgical Airway 03/16/23 1014 Shiley Cuffed 6.0 220 days              Peripheral Intravenous Line  Duration                  Peripheral IV - Single Lumen 10/20/23 0817 20 G Anterior;Left Forearm 2 days         Peripheral IV - Single Lumen 10/20/23 1557 20 G Anterior;Right Forearm 1 day                    Significant Labs:    CBC/Anemia Profile:  Recent Labs   Lab 10/21/23  0516 10/22/23  0511   WBC 7.55 6.98   HGB 10.5* 11.9*   HCT 35.3* 39.6*    173   MCV 93 93   RDW 14.7* 14.6*        Chemistries:  Recent Labs   Lab 10/21/23  0516 10/22/23  0511   * 137   K 4.3 3.6   CL 96 94*   CO2 31* 31*   BUN 26* 41*   CREATININE 0.9 1.1   CALCIUM 9.2 9.4   ALBUMIN 3.0* 3.1*   PROT 7.2 7.3   BILITOT 0.6 0.4   ALKPHOS 88 83   ALT 16 17   AST 18 17   MG 2.0 2.0   PHOS 4.0 3.9       All pertinent labs within the past 24 hours have been reviewed.    Significant Imaging:  I have reviewed all pertinent imaging results/findings within the past 24 hours.      ABG  Recent Labs   Lab 10/20/23  1452   PH 7.438   PO2 55*   PCO2 49.3*   HCO3 33.3*   BE 8*     Assessment/Plan:     ENT  Tracheostomy present  Routine trach care with suctioning PRN.  Hypertonic saline nebs BID PRN.    Pulmonary  * Acute hypoxemic respiratory failure  Afebrile with normal WBC.  CXR with coarsened interstitial and bilateral effusions, but no dense consolidation.  BNP 1000 in the setting of known chronic diastolic heart failure.  Reasonable to treat for COPD exacerbation.  Agree with empiric doxycycline for cap.  Obtain sputum culture if possible.   Recommend diuresis and maintain strict I/O.  Continue to use Vapotherm in the interim given the amount of secretions coming through the tracheostomy.  If patient continues to develop worsening respiratory distress, can swap out trach for cuffed Shiley in the event that he needs positive pressure ventilation    10/22:  Breathing better today after additional diuresis and COPD treatment.  Now on 8 lpm per trach collar with SpO2 > 95%.  Will continue with current uncuffed trach tube and treat for multifactorial contributions to respiratory failure, including COPD and CHF.  Given bump in BUN/Creat, it may be appropriate to stop diuresis soon.    Cardiac/Vascular  Acute on chronic diastolic heart failure  Most recent echo with EF 60%, biatrial enlargement and changes consistent with diastolic dysfunction.  BNP still 1000 with a CXR showing bilateral effusions.  No gross edema on exam, however.  Recommend aggressive diuresis with strict I/O.    10/22:  Agree with decreased intensity of diuresis with likely need to discontinue furosemide soon.      · Care discussed with Hospital Medicine on ICU rounds.  · Should be stable for stepdown from ICU.  · Will sign off, but feel free to call for additional Pulmonary questions.       Nikita Ling MD  Pulmonology  [facility] [place] Intensive Care

## 2023-10-22 NOTE — ASSESSMENT & PLAN NOTE
Patient with Hypoxic Respiratory failure which is Acute.  he is not on home oxygen. Supplemental oxygen was provided and noted- Oxygen Concentration (%):  [35] 35    .   Signs/symptoms of respiratory failure include- increased work of breathing and respiratory distress. Contributing diagnoses includes - CHF, COPD and Pneumonia Labs and images were reviewed. Patient Has recent ABG, which has been reviewed. Will treat underlying causes and adjust management of respiratory failure as follows-     -started on abx for possible pna with doxycycline and zosyn, procal negative and zosyn stopped.  -continue steroids likely benefit from taper at discharge.  -continue diuresis as tolerated, appears more euvolemic and may be able to stop diuresis tomorrow  -pulmonology consulted.

## 2023-10-22 NOTE — HOSPITAL COURSE
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 Per independent historian, EMS, patient's SpO2 was 88-91% on arrival. Patient's baseline is 88-94%. 82% in exam room. Patient is on home O2 of 6L with tracheostomy collar, but oxygen requirement continued to increase in the ed up to needing vapotherm and trach collar for adequate oxygenation. Pt started on tx for respiratory failure with steroids and diuresis. Aggressive airway care ordered as well. Pt weaned down to trach collar currently feeling much better and stable for transfer to the floor for further management. Still on 10 L HF trach collar, increased lasix and now at 8 L. Pt on 5-6 L at home. Continue with diuresis and oxygen weaning. PEG tube issue on 10/27 with GI consulted for possible need to exchange

## 2023-10-22 NOTE — ASSESSMENT & PLAN NOTE
Most recent echo with EF 60%, biatrial enlargement and changes consistent with diastolic dysfunction.  BNP still 1000 with a CXR showing bilateral effusions.  No gross edema on exam, however.  Recommend aggressive diuresis with strict I/O.    10/22:  Agree with decreased intensity of diuresis with likely need to discontinue furosemide soon.

## 2023-10-22 NOTE — SUBJECTIVE & OBJECTIVE
Interval History: Pt down to trach collar o2 only. Currently states sob is improved. No cp, fever, or chills. Likely step down to the floor today.     Review of Systems  Objective:     Vital Signs (Most Recent):  Temp: 98.1 °F (36.7 °C) (10/22/23 0400)  Pulse: 81 (10/22/23 0722)  Resp: (!) 23 (10/22/23 0722)  BP: 109/70 (10/22/23 0700)  SpO2: 98 % (10/22/23 0722) Vital Signs (24h Range):  Temp:  [97.9 °F (36.6 °C)-98.2 °F (36.8 °C)] 98.1 °F (36.7 °C)  Pulse:  [64-89] 81  Resp:  [17-38] 23  SpO2:  [91 %-100 %] 98 %  BP: ()/(52-88) 109/70     Weight: 59.3 kg (130 lb 11.7 oz)  Body mass index is 19.88 kg/m².    Intake/Output Summary (Last 24 hours) at 10/22/2023 0937  Last data filed at 10/22/2023 0600  Gross per 24 hour   Intake 880.78 ml   Output 1400 ml   Net -519.22 ml         Physical Exam  Vitals reviewed.   Constitutional:       General: He is not in acute distress.     Appearance: He is ill-appearing (chronic).   Neurological:      Mental Status: He is alert.     HENT:      Head: Normocephalic and atraumatic.      Mouth/Throat:      Mouth: Mucous membranes are dry.      Pharynx: Oropharynx is clear.   Neck:      Comments: Trach in place with trach collar for oxygenation  Cardiovascular:      Rate and Rhythm: Normal rate and regular rhythm.   Pulmonary:      Effort: Pulmonary effort is normal. No respiratory distress.   Abdominal:      Palpations: Abdomen is soft.   Musculoskeletal:         General: No swelling or tenderness.      Cervical back: Neck supple. No rigidity.   Skin:     General: Skin is warm and dry.   Neurological:      General: No focal deficit present.         Significant Labs: All pertinent labs within the past 24 hours have been reviewed.    Significant Imaging: I have reviewed all pertinent imaging results/findings within the past 24 hours.

## 2023-10-22 NOTE — ASSESSMENT & PLAN NOTE
Afebrile with normal WBC.  CXR with coarsened interstitial and bilateral effusions, but no dense consolidation.  BNP 1000 in the setting of known chronic diastolic heart failure.  Reasonable to treat for COPD exacerbation.  Agree with empiric doxycycline for cap.  Obtain sputum culture if possible.  Recommend diuresis and maintain strict I/O.  Continue to use Vapotherm in the interim given the amount of secretions coming through the tracheostomy.  If patient continues to develop worsening respiratory distress, can swap out trach for cuffed Shiley in the event that he needs positive pressure ventilation    10/22:  Breathing better today after additional diuresis and COPD treatment.  Now on 8 lpm per trach collar with SpO2 > 95%.  Will continue with current uncuffed trach tube and treat for multifactorial contributions to respiratory failure, including COPD and CHF.  Given bump in BUN/Creat, it may be appropriate to stop diuresis soon.

## 2023-10-22 NOTE — PROGRESS NOTES
[facility] Medicine  Progress Note    Patient Name: Fareed Richard Jr.  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 Per independent historian, EMS, patient's SpO2 was 88-91% on arrival. Patient's baseline is 88-94%. 82% in exam room. Patient is on home O2 of 6L with tracheostomy collar, but oxygen requirement continued to increase in the ed up to needing vapotherm and trach collar for adequate oxygenation.       Overview/Hospital Course:  No notes on file    Interval History: Pt down to trach collar o2 only. Currently states sob is improved. No cp, fever, or chills. Likely step down to the floor today.     Review of Systems  Objective:     Vital Signs (Most Recent):  Temp: 98.1 °F (36.7 °C) (10/22/23 0400)  Pulse: 81 (10/22/23 0722)  Resp: (!) 23 (10/22/23 0722)  BP: 109/70 (10/22/23 0700)  SpO2: 98 % (10/22/23 0722) Vital Signs (24h Range):  Temp:  [97.9 °F (36.6 °C)-98.2 °F (36.8 °C)] 98.1 °F (36.7 °C)  Pulse:  [64-89] 81  Resp:  [17-38] 23  SpO2:  [91 %-100 %] 98 %  BP: ()/(52-88) 109/70     Weight: 59.3 kg (130 lb 11.7 oz)  Body mass index is 19.88 kg/m².    Intake/Output Summary (Last 24 hours) at 10/22/2023 0937  Last data filed at 10/22/2023 0600  Gross per 24 hour   Intake 880.78 ml   Output 1400 ml   Net -519.22 ml         Physical Exam  Vitals reviewed.   Constitutional:       General: He is not in acute distress.     Appearance: He is ill-appearing (chronic).   Neurological:      Mental Status: He is alert.     HENT:      Head:  Normocephalic and atraumatic.      Mouth/Throat:      Mouth: Mucous membranes are dry.      Pharynx: Oropharynx is clear.   Neck:      Comments: Trach in place with trach collar for oxygenation  Cardiovascular:      Rate and Rhythm: Normal rate and regular rhythm.   Pulmonary:      Effort: Pulmonary effort is normal. No respiratory distress.   Abdominal:      Palpations: Abdomen is soft.   Musculoskeletal:         General: No swelling or tenderness.      Cervical back: Neck supple. No rigidity.   Skin:     General: Skin is warm and dry.   Neurological:      General: No focal deficit present.         Significant Labs: All pertinent labs within the past 24 hours have been reviewed.    Significant Imaging: I have reviewed all pertinent imaging results/findings within the past 24 hours.      Assessment/Plan:      * Acute hypoxemic respiratory failure  Patient with Hypoxic Respiratory failure which is Acute.  he is not on home oxygen. Supplemental oxygen was provided and noted- Oxygen Concentration (%):  [35] 35    .   Signs/symptoms of respiratory failure include- increased work of breathing and respiratory distress. Contributing diagnoses includes - CHF, COPD and Pneumonia Labs and images were reviewed. Patient Has recent ABG, which has been reviewed. Will treat underlying causes and adjust management of respiratory failure as follows-     -started on abx for possible pna with doxycycline and zosyn, procal negative and zosyn stopped.  -continue steroids likely benefit from taper at discharge.  -continue diuresis as tolerated, appears more euvolemic and may be able to stop diuresis tomorrow  -pulmonology consulted.    Acute on chronic diastolic heart failure  Attempting lasix for diuresis. May benefit from low dose po lasix at discharge. Cardiology outpatient f/u      PEG (percutaneous endoscopic gastrostomy) status  noted      Tracheostomy present  With pretty significant secretions noted. Trach care per  nursing/RT    -pulmonology consulted    ACP (advance care planning)  Advance Care Planning     Date: 10/20/2023  Pt wants to be full code at this time. Has home palliative. Palliative care following. 3 minutes spent in acp conversations          COPD exacerbation  tx as stated above      Primary hypertension  Restarting home medications. Currently controlled      Coronary artery disease involving native coronary artery of native heart with unstable angina pectoris  Continue home medications.      Squamous cell cancer of tongue  noted        VTE Risk Mitigation (From admission, onward)         Ordered     IP VTE HIGH RISK PATIENT  Once         10/20/23 1404     Place sequential compression device  Until discontinued         10/20/23 1404     Place sequential compression device  Until discontinued         10/20/23 1214                Discharge Planning   NISA:      Code Status: Full Code   Is the patient medically ready for discharge?:     Reason for patient still in hospital (select all that apply): Laboratory test, Treatment and Consult recommendations  Discharge Plan A: Home with family            Nikita Castillo III, MD  Department of Hospital Medicine   [facility] - Intensive Care

## 2023-10-22 NOTE — ASSESSMENT & PLAN NOTE
Attempting lasix for diuresis. May benefit from low dose po lasix at discharge. Cardiology outpatient f/u

## 2023-10-23 LAB
ALBUMIN SERPL BCP-MCNC: 3 G/DL (ref 3.5–5.2)
ALP SERPL-CCNC: 91 U/L (ref 55–135)
ALT SERPL W/O P-5'-P-CCNC: 15 U/L (ref 10–44)
ANION GAP SERPL CALC-SCNC: 11 MMOL/L (ref 8–16)
AST SERPL-CCNC: 13 U/L (ref 10–40)
BACTERIA SPEC AEROBE CULT: ABNORMAL
BACTERIA SPEC AEROBE CULT: ABNORMAL
BASOPHILS # BLD AUTO: 0.02 K/UL (ref 0–0.2)
BASOPHILS NFR BLD: 0.3 % (ref 0–1.9)
BILIRUB SERPL-MCNC: 0.3 MG/DL (ref 0.1–1)
BUN SERPL-MCNC: 47 MG/DL (ref 8–23)
CALCIUM SERPL-MCNC: 10.1 MG/DL (ref 8.7–10.5)
CHLORIDE SERPL-SCNC: 98 MMOL/L (ref 95–110)
CO2 SERPL-SCNC: 34 MMOL/L (ref 23–29)
CREAT SERPL-MCNC: 1.2 MG/DL (ref 0.5–1.4)
DIFFERENTIAL METHOD: ABNORMAL
EOSINOPHIL # BLD AUTO: 0.1 K/UL (ref 0–0.5)
EOSINOPHIL NFR BLD: 0.9 % (ref 0–8)
ERYTHROCYTE [DISTWIDTH] IN BLOOD BY AUTOMATED COUNT: 14.6 % (ref 11.5–14.5)
EST. GFR  (NO RACE VARIABLE): >60 ML/MIN/1.73 M^2
GLUCOSE SERPL-MCNC: 154 MG/DL (ref 70–110)
GRAM STN SPEC: ABNORMAL
HCT VFR BLD AUTO: 39.3 % (ref 40–54)
HGB BLD-MCNC: 11.8 G/DL (ref 14–18)
IMM GRANULOCYTES # BLD AUTO: 0.01 K/UL (ref 0–0.04)
IMM GRANULOCYTES NFR BLD AUTO: 0.1 % (ref 0–0.5)
LYMPHOCYTES # BLD AUTO: 0.9 K/UL (ref 1–4.8)
LYMPHOCYTES NFR BLD: 13.5 % (ref 18–48)
MAGNESIUM SERPL-MCNC: 2.1 MG/DL (ref 1.6–2.6)
MCH RBC QN AUTO: 27.9 PG (ref 27–31)
MCHC RBC AUTO-ENTMCNC: 30 G/DL (ref 32–36)
MCV RBC AUTO: 93 FL (ref 82–98)
MONOCYTES # BLD AUTO: 0.8 K/UL (ref 0.3–1)
MONOCYTES NFR BLD: 11.1 % (ref 4–15)
NEUTROPHILS # BLD AUTO: 5.1 K/UL (ref 1.8–7.7)
NEUTROPHILS NFR BLD: 74.1 % (ref 38–73)
NRBC BLD-RTO: 0 /100 WBC
PHOSPHATE SERPL-MCNC: 3.3 MG/DL (ref 2.7–4.5)
PLATELET # BLD AUTO: 173 K/UL (ref 150–450)
PMV BLD AUTO: 9.4 FL (ref 9.2–12.9)
POTASSIUM SERPL-SCNC: 5.9 MMOL/L (ref 3.5–5.1)
PROT SERPL-MCNC: 7.2 G/DL (ref 6–8.4)
RBC # BLD AUTO: 4.23 M/UL (ref 4.6–6.2)
SODIUM SERPL-SCNC: 143 MMOL/L (ref 136–145)
WBC # BLD AUTO: 6.87 K/UL (ref 3.9–12.7)

## 2023-10-23 PROCEDURE — 94761 N-INVAS EAR/PLS OXIMETRY MLT: CPT

## 2023-10-23 PROCEDURE — 99900026 HC AIRWAY MAINTENANCE (STAT)

## 2023-10-23 PROCEDURE — 99498 PR ADVNCD CARE PLAN ADDL 30 MIN: ICD-10-PCS | Mod: ,,, | Performed by: REGISTERED NURSE

## 2023-10-23 PROCEDURE — 94640 AIRWAY INHALATION TREATMENT: CPT

## 2023-10-23 PROCEDURE — 25000242 PHARM REV CODE 250 ALT 637 W/ HCPCS: Performed by: STUDENT IN AN ORGANIZED HEALTH CARE EDUCATION/TRAINING PROGRAM

## 2023-10-23 PROCEDURE — 25000003 PHARM REV CODE 250: Performed by: STUDENT IN AN ORGANIZED HEALTH CARE EDUCATION/TRAINING PROGRAM

## 2023-10-23 PROCEDURE — 63600175 PHARM REV CODE 636 W HCPCS: Performed by: STUDENT IN AN ORGANIZED HEALTH CARE EDUCATION/TRAINING PROGRAM

## 2023-10-23 PROCEDURE — 83735 ASSAY OF MAGNESIUM: CPT | Performed by: STUDENT IN AN ORGANIZED HEALTH CARE EDUCATION/TRAINING PROGRAM

## 2023-10-23 PROCEDURE — 80053 COMPREHEN METABOLIC PANEL: CPT | Performed by: STUDENT IN AN ORGANIZED HEALTH CARE EDUCATION/TRAINING PROGRAM

## 2023-10-23 PROCEDURE — 85025 COMPLETE CBC W/AUTO DIFF WBC: CPT | Performed by: STUDENT IN AN ORGANIZED HEALTH CARE EDUCATION/TRAINING PROGRAM

## 2023-10-23 PROCEDURE — 84100 ASSAY OF PHOSPHORUS: CPT | Performed by: STUDENT IN AN ORGANIZED HEALTH CARE EDUCATION/TRAINING PROGRAM

## 2023-10-23 PROCEDURE — 99498 ADVNCD CARE PLAN ADDL 30 MIN: CPT | Mod: ,,, | Performed by: REGISTERED NURSE

## 2023-10-23 PROCEDURE — 21400001 HC TELEMETRY ROOM

## 2023-10-23 PROCEDURE — 99900035 HC TECH TIME PER 15 MIN (STAT)

## 2023-10-23 PROCEDURE — 27000221 HC OXYGEN, UP TO 24 HOURS

## 2023-10-23 PROCEDURE — C9113 INJ PANTOPRAZOLE SODIUM, VIA: HCPCS | Performed by: STUDENT IN AN ORGANIZED HEALTH CARE EDUCATION/TRAINING PROGRAM

## 2023-10-23 PROCEDURE — 99497 PR ADVNCD CARE PLAN 30 MIN: ICD-10-PCS | Mod: ,,, | Performed by: REGISTERED NURSE

## 2023-10-23 PROCEDURE — 99233 PR SUBSEQUENT HOSPITAL CARE,LEVL III: ICD-10-PCS | Mod: ,,, | Performed by: REGISTERED NURSE

## 2023-10-23 PROCEDURE — 36415 COLL VENOUS BLD VENIPUNCTURE: CPT | Performed by: STUDENT IN AN ORGANIZED HEALTH CARE EDUCATION/TRAINING PROGRAM

## 2023-10-23 PROCEDURE — 99497 ADVNCD CARE PLAN 30 MIN: CPT | Mod: ,,, | Performed by: REGISTERED NURSE

## 2023-10-23 PROCEDURE — 99233 SBSQ HOSP IP/OBS HIGH 50: CPT | Mod: ,,, | Performed by: REGISTERED NURSE

## 2023-10-23 RX ADMIN — DOXYCYCLINE HYCLATE 100 MG: 100 TABLET, COATED ORAL at 08:10

## 2023-10-23 RX ADMIN — SODIUM CHLORIDE SOLN NEBU 3% 4 ML: 3 NEBU SOLN at 09:10

## 2023-10-23 RX ADMIN — PIPERACILLIN AND TAZOBACTAM 4.5 G: 4; .5 INJECTION, POWDER, LYOPHILIZED, FOR SOLUTION INTRAVENOUS; PARENTERAL at 08:10

## 2023-10-23 RX ADMIN — GLYCOPYRROLATE 1 MG: 1 TABLET ORAL at 08:10

## 2023-10-23 RX ADMIN — IPRATROPIUM BROMIDE AND ALBUTEROL SULFATE 3 ML: 2.5; .5 SOLUTION RESPIRATORY (INHALATION) at 05:10

## 2023-10-23 RX ADMIN — MUPIROCIN: 20 OINTMENT TOPICAL at 09:10

## 2023-10-23 RX ADMIN — PIPERACILLIN AND TAZOBACTAM 4.5 G: 4; .5 INJECTION, POWDER, LYOPHILIZED, FOR SOLUTION INTRAVENOUS; PARENTERAL at 12:10

## 2023-10-23 RX ADMIN — DOXYCYCLINE HYCLATE 100 MG: 100 TABLET, COATED ORAL at 09:10

## 2023-10-23 RX ADMIN — PANTOPRAZOLE SODIUM 40 MG: 40 INJECTION, POWDER, FOR SOLUTION INTRAVENOUS at 08:10

## 2023-10-23 RX ADMIN — POLYETHYLENE GLYCOL 3350 17 G: 17 POWDER, FOR SOLUTION ORAL at 08:10

## 2023-10-23 RX ADMIN — PIPERACILLIN AND TAZOBACTAM 4.5 G: 4; .5 INJECTION, POWDER, LYOPHILIZED, FOR SOLUTION INTRAVENOUS; PARENTERAL at 04:10

## 2023-10-23 RX ADMIN — PREDNISONE 40 MG: 20 TABLET ORAL at 08:10

## 2023-10-23 RX ADMIN — GLYCOPYRROLATE 1 MG: 1 TABLET ORAL at 04:10

## 2023-10-23 RX ADMIN — ATORVASTATIN CALCIUM 40 MG: 40 TABLET, FILM COATED ORAL at 09:10

## 2023-10-23 RX ADMIN — SODIUM CHLORIDE SOLN NEBU 3% 4 ML: 3 NEBU SOLN at 08:10

## 2023-10-23 RX ADMIN — GLYCOPYRROLATE 1 MG: 1 TABLET ORAL at 09:10

## 2023-10-23 RX ADMIN — MUPIROCIN: 20 OINTMENT TOPICAL at 08:10

## 2023-10-23 RX ADMIN — FUROSEMIDE 20 MG: 10 INJECTION, SOLUTION INTRAMUSCULAR; INTRAVENOUS at 08:10

## 2023-10-23 RX ADMIN — FLUOXETINE 20 MG: 20 CAPSULE ORAL at 08:10

## 2023-10-23 RX ADMIN — FOLIC ACID 1 MG: 1 TABLET ORAL at 08:10

## 2023-10-23 RX ADMIN — ASPIRIN 81 MG CHEWABLE TABLET 81 MG: 81 TABLET CHEWABLE at 08:10

## 2023-10-23 RX ADMIN — METOPROLOL TARTRATE 12.5 MG: 25 TABLET, FILM COATED ORAL at 08:10

## 2023-10-23 RX ADMIN — METOPROLOL TARTRATE 12.5 MG: 25 TABLET, FILM COATED ORAL at 09:10

## 2023-10-23 NOTE — PLAN OF CARE
[facility] - Med Surg  Discharge Reassessment    Primary Care Provider: Kodi Tubbs MD    Expected Discharge Date: pending    Reassessment (most recent)       Discharge Reassessment - 10/23/23 1213          Discharge Reassessment    Assessment Type Discharge Planning Reassessment     Did the patient's condition or plan change since previous assessment? Yes     Discharge Plan discussed with: --   MDT Rounding    Communicated NISA with patient/caregiver No     Discharge Plan A Home with family     Discharge Plan B Home     DME Needed Upon Discharge  none     Transition of Care Barriers None     Why the patient remains in the hospital Requires continued medical care        Post-Acute Status    Coverage [employer] - [employer] CHOICES 65     Discharge Delays None known at this time                   This patient has been screened for Case Management needs.  Based on (documentation in medical record/communication with attending physician/communication with nursing), patient is ready for stepdown from ICU.     Discharge plan:  Discharge to home; Ambulatory Referral to Palliative Care, PCP followup and additional appointments as ordered by the dischargng physician.    Case Management/Social Work remains available if a need arises, please enter consult for assistance.  For urgent needs contact Case Management Department/on-call at:  [phone]

## 2023-10-23 NOTE — NURSING
[facility] Intensive Care  ICU Shift Summary  Date: 10/23/2023      Prehospitalization: Home  Admit Date / LOS : 10/20/2023/ 3 days    Diagnosis: Acute hypoxemic respiratory failure    Consults:        Active: Palliative and Pulm CC       Needed: N/A     Code Status: Full Code   Advanced Directive: Not Received    LDA:  Lines/Drains/Airways       Drain  Duration                  Gastrostomy/Enterostomy 01/04/22 Percutaneous endoscopic gastrostomy (PEG)  days    Male External Urinary Catheter 10/20/23 2100 2 days              Airway  Duration             Adult Surgical Airway 03/16/23 1014 Shiley Cuffed 6.0 220 days              Peripheral Intravenous Line  Duration                  Peripheral IV - Single Lumen 10/20/23 0817 20 G Anterior;Left Forearm 2 days         Peripheral IV - Single Lumen 10/20/23 1557 20 G Anterior;Right Forearm 2 days                  Central Lines/Site/Justification:Patient Does Not Have Central Line  Urinary Cath/Order/Justification:Patient Does Not Have Urinary Catheter    Vasopressors/Infusions:    sodium chloride 0.9% 5 mL/hr at 10/21/23 1501          GOALS: Volume/ Hemodynamic: N/A                     RASS: N/A    Pain Management: none       Pain Controlled: yes     Rhythm: NSR    Respiratory Device: Venti Mask    Oxygen Concentration (%):  [35] 35                 Most Recent SBT/ SAT: N/A       MOVE Screen: PASS  ICU Liberation: yes    VTE Prophylaxis: Mechanical  Mobility: Ambulatory  Stress Ulcer Prophylaxis: Yes    Isolation: No active isolations    Dietary: Tube Feedings    Tolerance: yes  Advancement: @ goal    I & O (24h):    Intake/Output Summary (Last 24 hours) at 10/23/2023 0443  Last data filed at 10/22/2023 1859  Gross per 24 hour   Intake 1256.5 ml   Output 1950 ml   Net -693.5 ml        Restraints: No    Significant Dates:  Post Op Date: N/A  Rescue Date: N/A  Imaging/ Diagnostics: N/A    Noteworthy Labs:  none    COVID Test: (--)  CBC/Anemia Labs: Coags:    Recent  "Labs   Lab 10/21/23  0516 10/22/23  0511   WBC 7.55 6.98   HGB 10.5* 11.9*   HCT 35.3* 39.6*    173   MCV 93 93   RDW 14.7* 14.6*    No results for input(s): "PT", "INR", "APTT" in the last 168 hours.     Chemistries:   Recent Labs   Lab 10/21/23  0516 10/22/23  0511   * 137   K 4.3 3.6   CL 96 94*   CO2 31* 31*   BUN 26* 41*   CREATININE 0.9 1.1   CALCIUM 9.2 9.4   PROT 7.2 7.3   BILITOT 0.6 0.4   ALKPHOS 88 83   ALT 16 17   AST 18 17   MG 2.0 2.0   PHOS 4.0 3.9        Cardiac Enzymes: Ejection Fractions:    Recent Labs     10/20/23  0815   TROPONINI 0.051*    EF   Date Value Ref Range Status   03/20/2023 60 % Final        POCT Glucose: HbA1c:    No results for input(s): "POCTGLUCOSE" in the last 168 hours. Hemoglobin A1C   Date Value Ref Range Status   08/29/2023 5.8 (H) 4.0 - 5.6 % Final     Comment:     ADA Screening Guidelines:  5.7-6.4%  Consistent with prediabetes  >or=6.5%  Consistent with diabetes    High levels of fetal hemoglobin interfere with the HbA1C  assay. Heterozygous hemoglobin variants (HbS, HgC, etc)do  not significantly interfere with this assay.   However, presence of multiple variants may affect accuracy.             ICU LOS 2d 12h  Level of Care: OK to Transfer    Chart Check: 12 HR Done  Shift Summary/Plan for the shift: none   "

## 2023-10-23 NOTE — PROGRESS NOTES
Pt arrived to unit via bed from ICU, awake and alert. Trach with O2 via trach collar in use. No complaints voiced. Bed in low position. Call light in reach. HOB elevated. Heel boots in use. Scds in use. Yanker suctions set up. Male external catheter in use. No distress noted. Will continue to monitor.

## 2023-10-23 NOTE — PLAN OF CARE
Problem: Adult Inpatient Plan of Care  Goal: Plan of Care Review  Outcome: Ongoing, Progressing  Goal: Patient-Specific Goal (Individualized)  Outcome: Ongoing, Progressing  Goal: Absence of Hospital-Acquired Illness or Injury  Outcome: Ongoing, Progressing  Goal: Optimal Comfort and Wellbeing  Outcome: Ongoing, Progressing  Goal: Readiness for Transition of Care  Outcome: Ongoing, Progressing     Problem: Adjustment to Illness COPD (Chronic Obstructive Pulmonary Disease)  Goal: Optimal Chronic Illness Coping  Outcome: Ongoing, Progressing     Problem: Functional Ability Impaired COPD (Chronic Obstructive Pulmonary Disease)  Goal: Optimal Level of Functional [place]  Outcome: Ongoing, Progressing

## 2023-10-23 NOTE — ASSESSMENT & PLAN NOTE
- chronic history noted; wife and pt are familiar/comfortable with trach care   - pt does become easily anxious with of trach care   - does have a speaking valve at home, but currently affecting respiratory status too much for continual use  - used note book/writting mainly for communication

## 2023-10-23 NOTE — ASSESSMENT & PLAN NOTE
- EF 65%; Indeterminate left ventricular diastolic function  - pt and wife in good understanding of life limiting condition   - follows with Dr. Valles OP

## 2023-10-23 NOTE — PLAN OF CARE
Problem: Adult Inpatient Plan of Care  Goal: Plan of Care Review  Outcome: Ongoing, Progressing  Goal: Patient-Specific Goal (Individualized)  Outcome: Ongoing, Progressing  Goal: Absence of Hospital-Acquired Illness or Injury  Outcome: Ongoing, Progressing  Goal: Optimal Comfort and Wellbeing  Outcome: Ongoing, Progressing  Goal: Readiness for Transition of Care  Outcome: Ongoing, Progressing     Problem: Adjustment to Illness COPD (Chronic Obstructive Pulmonary Disease)  Goal: Optimal Chronic Illness Coping  Outcome: Ongoing, Progressing     Problem: Functional Ability Impaired COPD (Chronic Obstructive Pulmonary Disease)  Goal: Optimal Level of Functional [place]  Outcome: Ongoing, Progressing     Problem: Infection COPD (Chronic Obstructive Pulmonary Disease)  Goal: Absence of Infection Signs and Symptoms  Outcome: Ongoing, Progressing     Problem: Oral Intake Inadequate COPD (Chronic Obstructive Pulmonary Disease)  Goal: Improved Nutrition Intake  Outcome: Ongoing, Progressing     Problem: Respiratory Compromise COPD (Chronic Obstructive Pulmonary Disease)  Goal: Effective Oxygenation and Ventilation  Outcome: Ongoing, Progressing     Problem: Coping Ineffective  Goal: Effective Coping  Outcome: Ongoing, Progressing     Problem: Skin Injury Risk Increased  Goal: Skin Health and Integrity  Outcome: Ongoing, Progressing

## 2023-10-23 NOTE — SUBJECTIVE & OBJECTIVE
Medications:  Continuous Infusions:   sodium chloride 0.9% 5 mL/hr at 10/21/23 1501     Scheduled Meds:   aspirin  81 mg Per G Tube Daily    atorvastatin  40 mg Per G Tube QHS    doxycycline  100 mg Per G Tube Q12H    FLUoxetine  20 mg Per G Tube Daily    folic acid  1 mg Per G Tube Daily    furosemide (LASIX) injection  20 mg Intravenous Daily    glycopyrrolate  1 mg Per G Tube TID    metoprolol tartrate  12.5 mg Per G Tube BID    mupirocin   Nasal BID    pantoprazole  40 mg Intravenous Daily    piperacillin-tazobactam (Zosyn) IV (PEDS and ADULTS) (extended infusion is not appropriate)  4.5 g Intravenous Q8H    polyethylene glycol  17 g Oral Daily    predniSONE  40 mg Per G Tube Daily    sodium chloride 3%  4 mL Nebulization BID     PRN Meds:albuterol-ipratropium, bisacodyL, hydrOXYzine HCL, melatonin, ondansetron, sodium chloride 0.9%, sodium chloride 0.9%    Objective:     Vital Signs (Most Recent):  Temp: 97.7 °F (36.5 °C) (10/23/23 1122)  Pulse: 78 (10/23/23 1122)  Resp: 20 (10/23/23 1122)  BP: 123/69 (10/23/23 1122)  SpO2: 95 % (ear probe) (10/23/23 1136) Vital Signs (24h Range):  Temp:  [97.1 °F (36.2 °C)-98.9 °F (37.2 °C)] 97.7 °F (36.5 °C)  Pulse:  [64-91] 78  Resp:  [17-37] 20  SpO2:  [90 %-100 %] 95 %  BP: ()/(52-73) 123/69     Weight: 59.3 kg (130 lb 11.7 oz)  Body mass index is 19.88 kg/m².       Physical Exam  Vitals and nursing note reviewed.   HENT:      Head: Normocephalic and atraumatic.      Comments: Mild temporal wasting   Pulmonary:      Effort: Pulmonary effort is normal. No respiratory distress.      Comments: Trach  Musculoskeletal:      Right lower leg: No edema.      Left lower leg: No edema.   Neurological:      Mental Status: He is oriented to person, place, and time. Mental status is at baseline.      Comments: Non-verbal without valve (currently effecting respiratory status too much for regular use); used notebook/writing to communicate mainly       Advance Care Planning    Advance Directives:   Living Will: No    [place]: No    Do Not Resuscitate Status: No (confirmed wishes for full code this admission)    Medical Power of : Yes (wife is legal surrogate decision maker and preffered MPOA)    Goals of Care: What is most important right now is to focus on spending time at home, remaining as independent as possible, symptom/pain control, curative/life-prolongation (regardless of treatment burdens). Accordingly, we have decided that the best plan to meet the patient's goals includes continuing with treatment.     Significant Labs: All pertinent labs within the past 24 hours have been reviewed.  CBC:   Recent Labs   Lab 10/23/23  0527   WBC 6.87   HGB 11.8*   HCT 39.3*   MCV 93        BMP:  Recent Labs   Lab 10/23/23  0527   *      K 5.9*   CL 98   CO2 34*   BUN 47*   CREATININE 1.2   CALCIUM 10.1   MG 2.1     LFT:  Lab Results   Component Value Date    AST 13 10/23/2023    ALKPHOS 91 10/23/2023    BILITOT 0.3 10/23/2023     Albumin:   Albumin   Date Value Ref Range Status   10/23/2023 3.0 (L) 3.5 - 5.2 g/dL Final     Protein:   Total Protein   Date Value Ref Range Status   10/23/2023 7.2 6.0 - 8.4 g/dL Final     Lactic acid:   Lab Results   Component Value Date    LACTATE 0.5 04/11/2023    LACTATE 1.8 03/19/2023     Significant Imaging: I have reviewed all pertinent imaging results/findings within the past 24 hours.

## 2023-10-23 NOTE — PROGRESS NOTES
[facility] Medicine  Progress Note    Patient Name: Fareed Richard Jr.  MRN: [id number]  Patient Class: IP- Inpatient   Admission Date: 10/20/2023  Length of Stay: 3 days  Attending Physician: Luiz Patel MD  Primary Care Provider: Kodi Tubbs MD        Subjective:     Principal Problem:Acute hypoxemic respiratory failure        HPI:  73M with pmh of COPD, chronic diastolic failure, HTN and SCC of the tongue s/p glossectomy and flap w/tracheostomy presenting with shortness of breath for 1 week prior that acutely worsened on the morning of admission. Per independent historian, EMS, patient's SpO2 was 88-91% on arrival. Patient's baseline is 88-94%. 82% in exam room. Patient is on home O2 of 6L with tracheostomy collar, but oxygen requirement continued to increase in the ed up to needing vapotherm and trach collar for adequate oxygenation.       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 Per independent historian, EMS, patient's SpO2 was 88-91% on arrival. Patient's baseline is 88-94%. 82% in exam room. Patient is on home O2 of 6L with tracheostomy collar, but oxygen requirement continued to increase in the ed up to needing vapotherm and trach collar for adequate oxygenation. Pt started on tx for respiratory failure with steroids and diuresis. Aggressive airway care ordered as well. Pt weaned down to trach collar currently feeling much better and stable for transfer to the floor for further management.       Interval History: feels well this morning, still on 8 liters    Review of Systems  Objective:     Vital Signs (Most Recent):  Temp: 97.7 °F (36.5 °C) (10/23/23 1122)  Pulse: 78 (10/23/23 1122)  Resp: 20 (10/23/23 1122)  BP: 123/69 (10/23/23 1122)  SpO2: 95 % (ear probe) (10/23/23 1136)  Vital Signs (24h Range):  Temp:  [97.1 °F (36.2 °C)-98 °F (36.7 °C)] 97.7 °F (36.5 °C)  Pulse:  [64-91] 78  Resp:  [17-37] 20  SpO2:  [89 %-100 %] 95 %  BP: ()/(52-73) 123/69     Weight: 59.3 kg (130 lb 11.7 oz)  Body mass index is 19.88 kg/m².    Intake/Output Summary (Last 24 hours) at 10/23/2023 1536  Last data filed at 10/23/2023 1303  Gross per 24 hour   Intake 1492.4 ml   Output 1400 ml   Net 92.4 ml           Physical Exam  Vitals reviewed.   Constitutional:       General: He is not in acute distress.     Appearance: He is ill-appearing (chronic).   HENT:      Head: Normocephalic.   Cardiovascular:      Rate and Rhythm: Normal rate and regular rhythm.      Pulses: Normal pulses.   Pulmonary:      Effort: Pulmonary effort is normal.      Comments: Trach collar in place on 8 liters HF  Abdominal:      General: Bowel sounds are normal. There is no distension.   Musculoskeletal:         General: No swelling.   Skin:     General: Skin is warm and dry.   Neurological:      Mental Status: He is alert and oriented to person, place, and time. Mental status is at baseline.   Psychiatric:         Mood and Affect: Mood normal.         Thought Content: Thought content normal.             Significant Labs: All pertinent labs within the past 24 hours have been reviewed.    Significant Imaging: I have reviewed all pertinent imaging results/findings within the past 24 hours.      Assessment/Plan:      * Acute hypoxemic respiratory failure  Patient with Hypoxic Respiratory failure which is Acute.  he is not on home oxygen. Supplemental oxygen was provided and noted- Oxygen Concentration (%):  [35-40] 40    .   Signs/symptoms of respiratory failure include- increased work of breathing and respiratory distress. Contributing diagnoses includes - CHF, COPD and Pneumonia Labs and images were reviewed. Patient Has recent ABG, which has been reviewed. Will treat underlying causes and adjust management of respiratory failure as  follows-     -started on abx for possible pna with doxycycline and zosyn, procal negative and zosyn stopped.  -continue steroids   -continue diuresis as tolerated, appears more euvolemic and may be able to stop diuresis tomorrow  -pulmonology consulted.    Acute on chronic diastolic heart failure  Attempting lasix for diuresis. May benefit from low dose po lasix at discharge. Cardiology outpatient f/u      PEG (percutaneous endoscopic gastrostomy) status  noted      Tracheostomy present  With pretty significant secretions noted. Trach care per nursing/RT    -pulmonology consulted    ACP (advance care planning)  Advance Care Planning     Date: 10/20/2023  Pt wants to be full code at this time. Has home palliative. Palliative care following. 3 minutes spent in acp conversations          COPD exacerbation  tx as stated above      Primary hypertension  Restarting home medications. Currently controlled      Coronary artery disease involving native coronary artery of native heart with unstable angina pectoris  Continue home medications.      Squamous cell cancer of tongue  noted        VTE Risk Mitigation (From admission, onward)         Ordered     IP VTE HIGH RISK PATIENT  Once         10/20/23 1404     Place sequential compression device  Until discontinued         10/20/23 1404     Place sequential compression device  Until discontinued         10/20/23 1214                Discharge Planning   NISA:      Code Status: Full Code   Is the patient medically ready for discharge?:     Reason for patient still in hospital (select all that apply): Treatment  Discharge Plan A: Home with family   Discharge Delays: None known at this time              Luiz Patel MD  Department of Hospital Medicine   [facility]

## 2023-10-23 NOTE — ASSESSMENT & PLAN NOTE
"10/23/2023  - interval chart reviewed; discussed pt with MDT during ICU rounds   - met with pt at bedside; appears well today with good understanding of plan to transition to med surg floor today   - provided pt with requested reading materials regarding the difference between hospice and home palliative; reviewed some main topics, with plan for follow up discussion after he has had a chance to read them and discuss with wife; clarified to pt recognition and respect of pt's wishes to not wish for hospice at this time, and that this is to help him better understand recommended home palliative care (pt with previously expressed anxiety related to hospice as being only immediate end of life care or "going home to die")   - discussed during previous admission and this admission; strong recommendation for initiation of care with home palliative for relationship/trust building with a provider that can assist with GOC/ACP discussions in less stressful setting than the hospital, while continuing full treatment/plan of care; but with a provider that could also later provided hospice services if/when aligns with GOC/needs   - unfortunately this was not coordinated with prior admission or as outpatient; CM/SW to connect with home palliative program   - call placed to pt's wife for update on providing materials as she wished to review them as well; she plans to discuss them further with pt and develop a list of questions he may have   - she did share insight into additional fear of hospice and anxiousness about trach end of life, due to recent unfortunate events, with the passing of pt's brother 2 weeks ago; his brother also was being treated for head/neck cancer and required a trach, while home on hospice for a short time brother's trach became accidentally removed and passed away; this was a big shock to pt and has been hard for him   - continued emotional support to pt and wife; all questions addressed   - MDT updated "     10/20/2023  - consult received; interval chart reviewed in detail; discussed pt with hospital primary, Dr. Castillo   - pt known to myself and palliative medicine team from previous admission (3/18-4/30)   - met with patient and wife at ED bedside; re-introduction to palliative medicine team and role in current care and admission   - pt and wife, report pt has been doing well at home overall, with no readmission since extended admission with minimal complications up until about a week ago when he started to have increased secretions and feeling worn out   - GOC/ACP discussion; wife shares discussion with pt at home when EMS was in route, about wishes regarding code status once at hospital; pt wished for continued full code, which pt confirms as well; also wishes for full treatment at this time   - home palliative was not coordinated following previous admission; plan for continued discussion of palliative medicine and philosophy of care (pt does have some mistrust and is concerned about starting hospice against his wishes)   - emotional support provided; allowed time for questions/concerns, all addressed   - updated primary

## 2023-10-23 NOTE — SUBJECTIVE & OBJECTIVE
Interval History: feels well this morning, still on 8 liters    Review of Systems  Objective:     Vital Signs (Most Recent):  Temp: 97.7 °F (36.5 °C) (10/23/23 1122)  Pulse: 78 (10/23/23 1122)  Resp: 20 (10/23/23 1122)  BP: 123/69 (10/23/23 1122)  SpO2: 95 % (ear probe) (10/23/23 1136) Vital Signs (24h Range):  Temp:  [97.1 °F (36.2 °C)-98 °F (36.7 °C)] 97.7 °F (36.5 °C)  Pulse:  [64-91] 78  Resp:  [17-37] 20  SpO2:  [89 %-100 %] 95 %  BP: ()/(52-73) 123/69     Weight: 59.3 kg (130 lb 11.7 oz)  Body mass index is 19.88 kg/m².    Intake/Output Summary (Last 24 hours) at 10/23/2023 1536  Last data filed at 10/23/2023 1303  Gross per 24 hour   Intake 1492.4 ml   Output 1400 ml   Net 92.4 ml           Physical Exam  Vitals reviewed.   Constitutional:       General: He is not in acute distress.     Appearance: He is ill-appearing (chronic).   HENT:      Head: Normocephalic.   Cardiovascular:      Rate and Rhythm: Normal rate and regular rhythm.      Pulses: Normal pulses.   Pulmonary:      Effort: Pulmonary effort is normal.      Comments: Trach collar in place on 8 liters HF  Abdominal:      General: Bowel sounds are normal. There is no distension.   Musculoskeletal:         General: No swelling.   Skin:     General: Skin is warm and dry.   Neurological:      Mental Status: He is alert and oriented to person, place, and time. Mental status is at baseline.   Psychiatric:         Mood and Affect: Mood normal.         Thought Content: Thought content normal.             Significant Labs: All pertinent labs within the past 24 hours have been reviewed.    Significant Imaging: I have reviewed all pertinent imaging results/findings within the past 24 hours.

## 2023-10-23 NOTE — NURSING
[facility] [place]  Nurses Note -- 4 Eyes      10/23/2023       Skin assessed on: Q Shift      [x] No Pressure Injuries Present    [x]Prevention Measures Documented    [] Yes LDA  for Pressure Injury Previously documented     [] Yes New Pressure Injury Discovered   [] LDA for New Pressure Injury Added      Attending RN:  IRON MARQUEZ RN     Second RN:  Lyn

## 2023-10-23 NOTE — ASSESSMENT & PLAN NOTE
Patient with Hypoxic Respiratory failure which is Acute.  he is not on home oxygen. Supplemental oxygen was provided and noted- Oxygen Concentration (%):  [35-40] 40    .   Signs/symptoms of respiratory failure include- increased work of breathing and respiratory distress. Contributing diagnoses includes - CHF, COPD and Pneumonia Labs and images were reviewed. Patient Has recent ABG, which has been reviewed. Will treat underlying causes and adjust management of respiratory failure as follows-     -started on abx for possible pna with doxycycline and zosyn, procal negative and zosyn stopped.  -continue steroids   -continue diuresis as tolerated, appears more euvolemic and may be able to stop diuresis tomorrow  -pulmonology consulted.

## 2023-10-23 NOTE — PROGRESS NOTES
Occ residual noted via peg. 300 cc free water administered without difficulty. Pt tolerated well. Isosource tube feeding @ 55 cc/hr via peg. HOB elevated. Will continue to monitor.

## 2023-10-23 NOTE — PROGRESS NOTES
[facility] [place] - Med Surg  Palliative Medicine  Progress Note    Patient Name: Fareed Richard Jr.  MRN: [id number]  Admission Date: 10/20/2023  Hospital Length of Stay: 3 days  Code Status: Full Code   Attending Provider: Luiz Patel MD  Consulting Provider: Trupti Beck NP  Primary Care Physician: Kodi Tubbs MD  Principal Problem:Acute hypoxemic respiratory failure    Patient information was obtained from patient, spouse/SO and primary team.      Assessment/Plan:     ENT  Tracheostomy present  - chronic history noted; wife and pt are familiar/comfortable with trach care   - pt does become easily anxious with of trach care   - does have a speaking valve at home, but currently affecting respiratory status too much for continual use  - used note book/writting mainly for communication     Pulmonary  COPD exacerbation  - chronic history noted  - home O2 at baseline via trach   - wife reports doing well since prior admission, up until about a week ago with pt being very short of breath, increased secretions, intermitent hypoxia (in 50s at time decided to call EMS)   - continued management per hospital primary     Cardiac/Vascular  Acute on chronic diastolic heart failure  - EF 65%; Indeterminate left ventricular diastolic function  - pt and wife in good understanding of life limiting condition   - follows with Dr. Valles OP       Oncology  Squamous cell cancer of tongue  - chronic history noted  - pt with trach and PEG   - follows up with oncology as OP     GI  PEG (percutaneous endoscopic gastrostomy) status  - chronic history noted  - no recent difficulties with feedings per wife     Palliative Care  ACP (advance care planning)  10/23/2023  - interval chart reviewed; discussed pt with MDT during ICU rounds   - met with pt at bedside; appears well today with good understanding of plan to transition to med surg floor today   - provided pt with requested reading materials regarding the difference between  "hospice and home palliative; reviewed some main topics, with plan for follow up discussion after he has had a chance to read them and discuss with wife; clarified to pt recognition and respect of pt's wishes to not wish for hospice at this time, and that this is to help him better understand recommended home palliative care (pt with previously expressed anxiety related to hospice as being only immediate end of life care or "going home to die")   - discussed during previous admission and this admission; strong recommendation for initiation of care with home palliative for relationship/trust building with a provider that can assist with GOC/ACP discussions in less stressful setting than the hospital, while continuing full treatment/plan of care; but with a provider that could also later provided hospice services if/when aligns with GOC/needs   - unfortunately this was not coordinated with prior admission or as outpatient; CM/SW to connect with home palliative program   - call placed to pt's wife for update on providing materials as she wished to review them as well; she plans to discuss them further with pt and develop a list of questions he may have   - she did share insight into additional fear of hospice and anxiousness about trach end of life, due to recent unfortunate events, with the passing of pt's brother 2 weeks ago; his brother also was being treated for head/neck cancer and required a trach, while home on hospice for a short time brother's trach became accidentally removed and passed away; this was a big shock to pt and has been hard for him   - continued emotional support to pt and wife; all questions addressed   - MDT updated     10/20/2023  - consult received; interval chart reviewed in detail; discussed pt with hospital primary, Dr. Castillo   - pt known to myself and palliative medicine team from previous admission (3/18-4/30)   - met with patient and wife at ED bedside; re-introduction to palliative " medicine team and role in current care and admission   - pt and wife, report pt has been doing well at home overall, with no readmission since extended admission with minimal complications up until about a week ago when he started to have increased secretions and feeling worn out   - GOC/ACP discussion; wife shares discussion with pt at home when EMS was in route, about wishes regarding code status once at hospital; pt wished for continued full code, which pt confirms as well; also wishes for full treatment at this time   - home palliative was not coordinated following previous admission; plan for continued discussion of palliative medicine and philosophy of care (pt does have some mistrust and is concerned about starting hospice against his wishes)   - emotional support provided; allowed time for questions/concerns, all addressed   - updated primary         I will follow-up with patient. Please contact us if you have any additional questions.     Total visit time: 81 minutes    35 min visit time including: face to face time in discussion of symptom assessment, and exploring options and burdens of offered treatments.  This also includes non-face to face time preparing to see the patient including chart review, obtaining and/or reviewing separately obtained history, documenting clinical information in the electronic or other health record, independently interpreting results and communicating results to the patient/family/caregiver, family discussions by phone if not able to be present, coordination of care with other specialists, and discharge planning.     46 min ACP time spent: goals of care, advanced care planning, emotional support, formulating and communicating prognosis, exploring burden/ benefit of various approaches of treatment.     Trupti Beck NP  Palliative Medicine  [facility]    Subjective:     Chief Complaint:   Chief Complaint   Patient presents with    Shortness of  "Breath     Ems reports at 3am pt became sob with O2 at 55%, reports doing treatment at home and improving. Reports being on 6L trach collar. Pt reports normal sats are 88-94%, ems reports 91%.       HPI:   From H&P: "Fareed Richard Jr. is a 73 y.o. male, with a past medical history of COPD, CHF (EF 60%), HLD, HTN, and cancer, who presents to the ED via EMS with shortness of breath that began 1 week ago and increased at 3 am this morning. Patient received at home breathing treatment with relief. Per independent historian, EMS, patient's SpO2 was 88-91% on arrival. Patient's baseline is 88-94%. 82% in exam room. Patient is on home O2 of 6L with tracheostomy collar. Patient also notes cough. Patient denies chest pain, fever, chills, abdominal pain, nausea, vomiting, diarrhea, dysuria, headaches, congestion, sore throat, arm or leg trouble, eye pain, ear pain, rash, or other associated symptoms."     Palliative medicine consulted for goals of care discussion and advance care planning; for details of visit, see advance care planning section of plan.       Hospital Course:  No notes on file    Medications:  Continuous Infusions:   sodium chloride 0.9% 5 mL/hr at 10/21/23 1501     Scheduled Meds:   aspirin  81 mg Per G Tube Daily    atorvastatin  40 mg Per G Tube QHS    doxycycline  100 mg Per G Tube Q12H    FLUoxetine  20 mg Per G Tube Daily    folic acid  1 mg Per G Tube Daily    furosemide (LASIX) injection  20 mg Intravenous Daily    glycopyrrolate  1 mg Per G Tube TID    metoprolol tartrate  12.5 mg Per G Tube BID    mupirocin   Nasal BID    pantoprazole  40 mg Intravenous Daily    piperacillin-tazobactam (Zosyn) IV (PEDS and ADULTS) (extended infusion is not appropriate)  4.5 g Intravenous Q8H    polyethylene glycol  17 g Oral Daily    predniSONE  40 mg Per G Tube Daily    sodium chloride 3%  4 mL Nebulization BID     PRN Meds:albuterol-ipratropium, bisacodyL, hydrOXYzine HCL, melatonin, ondansetron, " sodium chloride 0.9%, sodium chloride 0.9%    Objective:     Vital Signs (Most Recent):  Temp: 97.7 °F (36.5 °C) (10/23/23 1122)  Pulse: 78 (10/23/23 1122)  Resp: 20 (10/23/23 1122)  BP: 123/69 (10/23/23 1122)  SpO2: 95 % (ear probe) (10/23/23 1136) Vital Signs (24h Range):  Temp:  [97.1 °F (36.2 °C)-98.9 °F (37.2 °C)] 97.7 °F (36.5 °C)  Pulse:  [64-91] 78  Resp:  [17-37] 20  SpO2:  [90 %-100 %] 95 %  BP: ()/(52-73) 123/69     Weight: 59.3 kg (130 lb 11.7 oz)  Body mass index is 19.88 kg/m².       Physical Exam  Vitals and nursing note reviewed.   HENT:      Head: Normocephalic and atraumatic.      Comments: Mild temporal wasting   Pulmonary:      Effort: Pulmonary effort is normal. No respiratory distress.      Comments: Trach  Musculoskeletal:      Right lower leg: No edema.      Left lower leg: No edema.   Neurological:      Mental Status: He is oriented to person, place, and time. Mental status is at baseline.      Comments: Non-verbal without valve (currently effecting respiratory status too much for regular use); used notebook/writing to communicate mainly       Advance Care Planning  Advance Directives:   Living Will: No    LaPOST: No    Do Not Resuscitate Status: No (confirmed wishes for full code this admission)    Medical Power of : Yes (wife is legal surrogate decision maker and preffered MPOA)    Goals of Care: What is most important right now is to focus on spending time at home, remaining as independent as possible, symptom/pain control, curative/life-prolongation (regardless of treatment burdens). Accordingly, we have decided that the best plan to meet the patient's goals includes continuing with treatment.     Significant Labs: All pertinent labs within the past 24 hours have been reviewed.  CBC:   Recent Labs   Lab 10/23/23  0527   WBC 6.87   HGB 11.8*   HCT 39.3*   MCV 93        BMP:  Recent Labs   Lab 10/23/23  0527   *      K 5.9*   CL 98   CO2 34*   BUN 47*    CREATININE 1.2   CALCIUM 10.1   MG 2.1     LFT:  Lab Results   Component Value Date    AST 13 10/23/2023    ALKPHOS 91 10/23/2023    BILITOT 0.3 10/23/2023     Albumin:   Albumin   Date Value Ref Range Status   10/23/2023 3.0 (L) 3.5 - 5.2 g/dL Final     Protein:   Total Protein   Date Value Ref Range Status   10/23/2023 7.2 6.0 - 8.4 g/dL Final     Lactic acid:   Lab Results   Component Value Date    LACTATE 0.5 04/11/2023    LACTATE 1.8 03/19/2023     Significant Imaging: I have reviewed all pertinent imaging results/findings within the past 24 hours.      Trupti Beck NP  Palliative Medicine  [facility] Surg

## 2023-10-23 NOTE — NURSING
[facility] [place]  Nurses Note -- 4 Eyes      10/22/2023       Skin assessed on: Q Shift      [x] No Pressure Injuries Present    [x]Prevention Measures Documented    [] Yes LDA  for Pressure Injury Previously documented     [] Yes New Pressure Injury Discovered   [] LDA for New Pressure Injury Added      Attending RN:  LAUREEN Mendez RN      Second RN:  RONI Garza RN

## 2023-10-23 NOTE — NURSING
Report received. Updates given. Pt remains in ICU at this time but has transfer orders. Pt resting comfortably. Vital signs WDL. Pt denies any pain or discomfort at this time. Pt  is on 8 liters humidified O2 via trache collar. Bedside table and call light within reach. Monitoring ongoing

## 2023-10-23 NOTE — PROGRESS NOTES
[facility]  Nurses Note -- 4 Eyes      10/23/2023       Skin assessed on: Q Shift      [x] No Pressure Injuries Present    [x]Prevention Measures Documented    [] Yes LDA  for Pressure Injury Previously documented     [] Yes New Pressure Injury Discovered   [] LDA for New Pressure Injury Added      Attending RN:  Francisca Donohue, RN     Second RN:  Annmarie dobbins

## 2023-10-24 LAB
ALBUMIN SERPL BCP-MCNC: 2.9 G/DL (ref 3.5–5.2)
ALP SERPL-CCNC: 91 U/L (ref 55–135)
ALT SERPL W/O P-5'-P-CCNC: 14 U/L (ref 10–44)
ANION GAP SERPL CALC-SCNC: 10 MMOL/L (ref 8–16)
AST SERPL-CCNC: 10 U/L (ref 10–40)
BASOPHILS # BLD AUTO: 0.01 K/UL (ref 0–0.2)
BASOPHILS NFR BLD: 0.2 % (ref 0–1.9)
BILIRUB SERPL-MCNC: 0.3 MG/DL (ref 0.1–1)
BUN SERPL-MCNC: 42 MG/DL (ref 8–23)
CALCIUM SERPL-MCNC: 9.1 MG/DL (ref 8.7–10.5)
CHLORIDE SERPL-SCNC: 97 MMOL/L (ref 95–110)
CO2 SERPL-SCNC: 33 MMOL/L (ref 23–29)
CREAT SERPL-MCNC: 1.1 MG/DL (ref 0.5–1.4)
DIFFERENTIAL METHOD: ABNORMAL
EOSINOPHIL # BLD AUTO: 0 K/UL (ref 0–0.5)
EOSINOPHIL NFR BLD: 0.7 % (ref 0–8)
ERYTHROCYTE [DISTWIDTH] IN BLOOD BY AUTOMATED COUNT: 14.5 % (ref 11.5–14.5)
EST. GFR  (NO RACE VARIABLE): >60 ML/MIN/1.73 M^2
GLUCOSE SERPL-MCNC: 184 MG/DL (ref 70–110)
HCT VFR BLD AUTO: 36 % (ref 40–54)
HGB BLD-MCNC: 10.6 G/DL (ref 14–18)
IMM GRANULOCYTES # BLD AUTO: 0.02 K/UL (ref 0–0.04)
IMM GRANULOCYTES NFR BLD AUTO: 0.3 % (ref 0–0.5)
LYMPHOCYTES # BLD AUTO: 0.7 K/UL (ref 1–4.8)
LYMPHOCYTES NFR BLD: 11.8 % (ref 18–48)
MAGNESIUM SERPL-MCNC: 2 MG/DL (ref 1.6–2.6)
MCH RBC QN AUTO: 27.7 PG (ref 27–31)
MCHC RBC AUTO-ENTMCNC: 29.4 G/DL (ref 32–36)
MCV RBC AUTO: 94 FL (ref 82–98)
MONOCYTES # BLD AUTO: 0.6 K/UL (ref 0.3–1)
MONOCYTES NFR BLD: 9.6 % (ref 4–15)
NEUTROPHILS # BLD AUTO: 4.7 K/UL (ref 1.8–7.7)
NEUTROPHILS NFR BLD: 77.4 % (ref 38–73)
NRBC BLD-RTO: 0 /100 WBC
PHOSPHATE SERPL-MCNC: 3 MG/DL (ref 2.7–4.5)
PLATELET # BLD AUTO: 174 K/UL (ref 150–450)
PMV BLD AUTO: 10 FL (ref 9.2–12.9)
POTASSIUM SERPL-SCNC: 3.6 MMOL/L (ref 3.5–5.1)
PROT SERPL-MCNC: 6.7 G/DL (ref 6–8.4)
RBC # BLD AUTO: 3.83 M/UL (ref 4.6–6.2)
SODIUM SERPL-SCNC: 140 MMOL/L (ref 136–145)
WBC # BLD AUTO: 6.03 K/UL (ref 3.9–12.7)

## 2023-10-24 PROCEDURE — 63600175 PHARM REV CODE 636 W HCPCS: Performed by: STUDENT IN AN ORGANIZED HEALTH CARE EDUCATION/TRAINING PROGRAM

## 2023-10-24 PROCEDURE — 84100 ASSAY OF PHOSPHORUS: CPT | Performed by: STUDENT IN AN ORGANIZED HEALTH CARE EDUCATION/TRAINING PROGRAM

## 2023-10-24 PROCEDURE — 83735 ASSAY OF MAGNESIUM: CPT | Performed by: STUDENT IN AN ORGANIZED HEALTH CARE EDUCATION/TRAINING PROGRAM

## 2023-10-24 PROCEDURE — 99900035 HC TECH TIME PER 15 MIN (STAT)

## 2023-10-24 PROCEDURE — C9113 INJ PANTOPRAZOLE SODIUM, VIA: HCPCS | Performed by: STUDENT IN AN ORGANIZED HEALTH CARE EDUCATION/TRAINING PROGRAM

## 2023-10-24 PROCEDURE — 21400001 HC TELEMETRY ROOM

## 2023-10-24 PROCEDURE — 36415 COLL VENOUS BLD VENIPUNCTURE: CPT | Performed by: STUDENT IN AN ORGANIZED HEALTH CARE EDUCATION/TRAINING PROGRAM

## 2023-10-24 PROCEDURE — 25000003 PHARM REV CODE 250: Performed by: STUDENT IN AN ORGANIZED HEALTH CARE EDUCATION/TRAINING PROGRAM

## 2023-10-24 PROCEDURE — 27100171 HC OXYGEN HIGH FLOW UP TO 24 HOURS

## 2023-10-24 PROCEDURE — 85025 COMPLETE CBC W/AUTO DIFF WBC: CPT | Performed by: STUDENT IN AN ORGANIZED HEALTH CARE EDUCATION/TRAINING PROGRAM

## 2023-10-24 PROCEDURE — 94640 AIRWAY INHALATION TREATMENT: CPT

## 2023-10-24 PROCEDURE — 25000242 PHARM REV CODE 250 ALT 637 W/ HCPCS: Performed by: STUDENT IN AN ORGANIZED HEALTH CARE EDUCATION/TRAINING PROGRAM

## 2023-10-24 PROCEDURE — 80053 COMPREHEN METABOLIC PANEL: CPT | Performed by: STUDENT IN AN ORGANIZED HEALTH CARE EDUCATION/TRAINING PROGRAM

## 2023-10-24 PROCEDURE — 94761 N-INVAS EAR/PLS OXIMETRY MLT: CPT

## 2023-10-24 RX ORDER — FUROSEMIDE 10 MG/ML
40 INJECTION INTRAMUSCULAR; INTRAVENOUS DAILY
Status: DISCONTINUED | OUTPATIENT
Start: 2023-10-24 | End: 2023-10-28 | Stop reason: HOSPADM

## 2023-10-24 RX ADMIN — GLYCOPYRROLATE 1 MG: 1 TABLET ORAL at 08:10

## 2023-10-24 RX ADMIN — GLYCOPYRROLATE 1 MG: 1 TABLET ORAL at 04:10

## 2023-10-24 RX ADMIN — PREDNISONE 40 MG: 20 TABLET ORAL at 09:10

## 2023-10-24 RX ADMIN — FLUOXETINE 20 MG: 20 CAPSULE ORAL at 09:10

## 2023-10-24 RX ADMIN — MUPIROCIN: 20 OINTMENT TOPICAL at 09:10

## 2023-10-24 RX ADMIN — SODIUM CHLORIDE SOLN NEBU 3% 4 ML: 3 NEBU SOLN at 07:10

## 2023-10-24 RX ADMIN — PIPERACILLIN AND TAZOBACTAM 4.5 G: 4; .5 INJECTION, POWDER, LYOPHILIZED, FOR SOLUTION INTRAVENOUS; PARENTERAL at 11:10

## 2023-10-24 RX ADMIN — POLYETHYLENE GLYCOL 3350 17 G: 17 POWDER, FOR SOLUTION ORAL at 09:10

## 2023-10-24 RX ADMIN — METOPROLOL TARTRATE 12.5 MG: 25 TABLET, FILM COATED ORAL at 08:10

## 2023-10-24 RX ADMIN — FUROSEMIDE 40 MG: 10 INJECTION, SOLUTION INTRAVENOUS at 09:10

## 2023-10-24 RX ADMIN — PANTOPRAZOLE SODIUM 40 MG: 40 INJECTION, POWDER, FOR SOLUTION INTRAVENOUS at 09:10

## 2023-10-24 RX ADMIN — PIPERACILLIN AND TAZOBACTAM 4.5 G: 4; .5 INJECTION, POWDER, LYOPHILIZED, FOR SOLUTION INTRAVENOUS; PARENTERAL at 12:10

## 2023-10-24 RX ADMIN — ASPIRIN 81 MG CHEWABLE TABLET 81 MG: 81 TABLET CHEWABLE at 09:10

## 2023-10-24 RX ADMIN — PIPERACILLIN AND TAZOBACTAM 4.5 G: 4; .5 INJECTION, POWDER, LYOPHILIZED, FOR SOLUTION INTRAVENOUS; PARENTERAL at 04:10

## 2023-10-24 RX ADMIN — MELATONIN TAB 3 MG 6 MG: 3 TAB at 08:10

## 2023-10-24 RX ADMIN — METOPROLOL TARTRATE 12.5 MG: 25 TABLET, FILM COATED ORAL at 09:10

## 2023-10-24 RX ADMIN — FOLIC ACID 1 MG: 1 TABLET ORAL at 09:10

## 2023-10-24 RX ADMIN — DOXYCYCLINE HYCLATE 100 MG: 100 TABLET, COATED ORAL at 09:10

## 2023-10-24 RX ADMIN — IPRATROPIUM BROMIDE AND ALBUTEROL SULFATE 3 ML: 2.5; .5 SOLUTION RESPIRATORY (INHALATION) at 07:10

## 2023-10-24 RX ADMIN — PIPERACILLIN AND TAZOBACTAM 4.5 G: 4; .5 INJECTION, POWDER, LYOPHILIZED, FOR SOLUTION INTRAVENOUS; PARENTERAL at 09:10

## 2023-10-24 RX ADMIN — DOXYCYCLINE HYCLATE 100 MG: 100 TABLET, COATED ORAL at 08:10

## 2023-10-24 RX ADMIN — GLYCOPYRROLATE 1 MG: 1 TABLET ORAL at 09:10

## 2023-10-24 RX ADMIN — ATORVASTATIN CALCIUM 40 MG: 40 TABLET, FILM COATED ORAL at 08:10

## 2023-10-24 RX ADMIN — MUPIROCIN: 20 OINTMENT TOPICAL at 08:10

## 2023-10-24 NOTE — PROGRESS NOTES
[facility] Medicine  Progress Note    Patient Name: Fareed Richard Jr.  MRN: [id number]  Patient Class: IP- Inpatient   Admission Date: 10/20/2023  Length of Stay: 4 days  Attending Physician: Luiz Patel MD  Primary Care Provider: Kodi Tubbs MD        Subjective:     Principal Problem:Acute hypoxemic respiratory failure        HPI:  73M with pmh of COPD, chronic diastolic failure, HTN and SCC of the tongue s/p glossectomy and flap w/tracheostomy presenting with shortness of breath for 1 week prior that acutely worsened on the morning of admission. Per independent historian, EMS, patient's SpO2 was 88-91% on arrival. Patient's baseline is 88-94%. 82% in exam room. Patient is on home O2 of 6L with tracheostomy collar, but oxygen requirement continued to increase in the ed up to needing vapotherm and trach collar for adequate oxygenation.       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 Per independent historian, EMS, patient's SpO2 was 88-91% on arrival. Patient's baseline is 88-94%. 82% in exam room. Patient is on home O2 of 6L with tracheostomy collar, but oxygen requirement continued to increase in the ed up to needing vapotherm and trach collar for adequate oxygenation. Pt started on tx for respiratory failure with steroids and diuresis. Aggressive airway care ordered as well. Pt weaned down to trach collar currently feeling much better and stable for transfer to the floor for further management.       Interval History: no new issues but increasing O2 requirements. Increased lasix today.     Review of Systems  Objective:     Vital Signs (Most Recent):  Temp: 97.6 °F (36.4 °C) (10/24/23 1137)  Pulse: 78 (10/24/23 1315)  Resp: 18 (10/24/23 1315)  BP: (!) 143/79 (10/24/23 1137)  SpO2:  (!) 94 % (10/24/23 1315) Vital Signs (24h Range):  Temp:  [97.4 °F (36.3 °C)-98 °F (36.7 °C)] 97.6 °F (36.4 °C)  Pulse:  [69-85] 78  Resp:  [17-21] 18  SpO2:  [86 %-97 %] 94 %  BP: (113-155)/(57-80) 143/79     Weight: 59.3 kg (130 lb 11.7 oz)  Body mass index is 19.88 kg/m².    Intake/Output Summary (Last 24 hours) at 10/24/2023 1454  Last data filed at 10/24/2023 1237  Gross per 24 hour   Intake 2046.59 ml   Output 950 ml   Net 1096.59 ml           Physical Exam  Vitals reviewed.   Constitutional:       General: He is not in acute distress.     Appearance: He is ill-appearing (chronic).   HENT:      Head: Normocephalic.   Cardiovascular:      Rate and Rhythm: Normal rate and regular rhythm.      Pulses: Normal pulses.   Pulmonary:      Effort: Pulmonary effort is normal.      Comments: Trach collar in place on 10 liters HF  Abdominal:      General: Bowel sounds are normal. There is no distension.   Musculoskeletal:         General: No swelling.   Skin:     General: Skin is warm and dry.   Neurological:      Mental Status: He is alert and oriented to person, place, and time. Mental status is at baseline.   Psychiatric:         Mood and Affect: Mood normal.         Thought Content: Thought content normal.             Significant Labs: All pertinent labs within the past 24 hours have been reviewed.    Significant Imaging: I have reviewed all pertinent imaging results/findings within the past 24 hours.      Assessment/Plan:      * Acute hypoxemic respiratory failure  Patient with Hypoxic Respiratory failure which is Acute.  he is not on home oxygen. Supplemental oxygen was provided and noted- Oxygen Concentration (%):  [40-60] 40    .   Signs/symptoms of respiratory failure include- increased work of breathing and respiratory distress. Contributing diagnoses includes - CHF, COPD and Pneumonia Labs and images were reviewed. Patient Has recent ABG, which has been reviewed. Will treat underlying causes and adjust  management of respiratory failure as follows-     -started on abx for possible pna with doxycycline and zosyn, procal negative and zosyn stopped.  -continue steroids   -continue diuresis, increase to lasix 40 mg IV bid for now. I/Os patient is around -500. Monitor closely  -pulmonology consulted.    Acute on chronic diastolic heart failure  Lasix 40 mg IV bid for now - monitor response. BNP >900 on admission and only net negative 500 so far.  May benefit from low dose po lasix at discharge. Cardiology outpatient f/u      PEG (percutaneous endoscopic gastrostomy) status  noted      Tracheostomy present  With pretty significant secretions noted. Trach care per nursing/RT    -pulmonology consulted    ACP (advance care planning)  Advance Care Planning     Date: 10/20/2023  Pt wants to be full code at this time. Has home palliative. Palliative care following. 3 minutes spent in acp conversations          COPD exacerbation  tx as stated above      Primary hypertension  Restarting home medications. Currently controlled      Coronary artery disease involving native coronary artery of native heart with unstable angina pectoris  Continue home medications.      Squamous cell cancer of tongue  noted        VTE Risk Mitigation (From admission, onward)         Ordered     IP VTE HIGH RISK PATIENT  Once         10/20/23 1404     Place sequential compression device  Until discontinued         10/20/23 1404     Place sequential compression device  Until discontinued         10/20/23 1214                Discharge Planning   NISA:      Code Status: Full Code   Is the patient medically ready for discharge?:     Reason for patient still in hospital (select all that apply): Treatment  Discharge Plan A: Home with family   Discharge Delays: None known at this time              Luiz Patel MD  Department of Hospital Medicine   [facility]

## 2023-10-24 NOTE — PLAN OF CARE
No acute distress noted, patient free from falls or injury this shift.  Bed in low position, wheels locked, call light in reach for assistance, plan of care continued.       Problem: Adjustment to Illness COPD (Chronic Obstructive Pulmonary Disease)  Goal: Optimal Chronic Illness Coping  Outcome: Ongoing, Progressing     Problem: Functional Ability Impaired COPD (Chronic Obstructive Pulmonary Disease)  Goal: Optimal Level of Functional [place]  Outcome: Ongoing, Progressing     Problem: Infection COPD (Chronic Obstructive Pulmonary Disease)  Goal: Absence of Infection Signs and Symptoms  Outcome: Ongoing, Progressing

## 2023-10-24 NOTE — SUBJECTIVE & OBJECTIVE
Interval History: no new issues but increasing O2 requirements. Increased lasix today.     Review of Systems  Objective:     Vital Signs (Most Recent):  Temp: 97.6 °F (36.4 °C) (10/24/23 1137)  Pulse: 78 (10/24/23 1315)  Resp: 18 (10/24/23 1315)  BP: (!) 143/79 (10/24/23 1137)  SpO2: (!) 94 % (10/24/23 1315) Vital Signs (24h Range):  Temp:  [97.4 °F (36.3 °C)-98 °F (36.7 °C)] 97.6 °F (36.4 °C)  Pulse:  [69-85] 78  Resp:  [17-21] 18  SpO2:  [86 %-97 %] 94 %  BP: (113-155)/(57-80) 143/79     Weight: 59.3 kg (130 lb 11.7 oz)  Body mass index is 19.88 kg/m².    Intake/Output Summary (Last 24 hours) at 10/24/2023 1454  Last data filed at 10/24/2023 1237  Gross per 24 hour   Intake 2046.59 ml   Output 950 ml   Net 1096.59 ml           Physical Exam  Vitals reviewed.   Constitutional:       General: He is not in acute distress.     Appearance: He is ill-appearing (chronic).   HENT:      Head: Normocephalic.   Cardiovascular:      Rate and Rhythm: Normal rate and regular rhythm.      Pulses: Normal pulses.   Pulmonary:      Effort: Pulmonary effort is normal.      Comments: Trach collar in place on 10 liters HF  Abdominal:      General: Bowel sounds are normal. There is no distension.   Musculoskeletal:         General: No swelling.   Skin:     General: Skin is warm and dry.   Neurological:      Mental Status: He is alert and oriented to person, place, and time. Mental status is at baseline.   Psychiatric:         Mood and Affect: Mood normal.         Thought Content: Thought content normal.             Significant Labs: All pertinent labs within the past 24 hours have been reviewed.    Significant Imaging: I have reviewed all pertinent imaging results/findings within the past 24 hours.

## 2023-10-24 NOTE — PLAN OF CARE
Problem: Adult Inpatient Plan of Care  Goal: Plan of Care Review  Outcome: Ongoing, Progressing  Goal: Patient-Specific Goal (Individualized)  Outcome: Ongoing, Progressing  Goal: Absence of Hospital-Acquired Illness or Injury  Outcome: Ongoing, Progressing  Goal: Optimal Comfort and Wellbeing  Outcome: Ongoing, Progressing  Goal: Readiness for Transition of Care  Outcome: Ongoing, Progressing     Problem: Coping Ineffective  Goal: Effective Coping  Outcome: Ongoing, Progressing     Problem: Skin Injury Risk Increased  Goal: Skin Health and Integrity  Outcome: Ongoing, Progressing     Problem: ARDS (Acute Respiratory Distress Syndrome)  Goal: Effective Oxygenation  Outcome: Ongoing, Progressing  Intervention: Optimize Oxygenation, Ventilation and Perfusion  Flowsheets (Taken 10/24/2023 1218)  Airway/Ventilation Management: airway patency maintained  Stabilization Measures: respiratory support increased   Pt A&Ox4, free from falls/injury, and able to make needs known during shift. VSS on 60% trach collar. IV fluids continued per orders. Pt had no c/o pain during shift. Telemetry monitoring continued on box #[id number], visible and audible on monitor at nurses' station, no acute distress noted. Bed locked and in lowest position. Bedside table and call light in reach. Will cont to monitor.

## 2023-10-24 NOTE — ASSESSMENT & PLAN NOTE
Lasix 40 mg IV bid for now - monitor response. BNP >900 on admission and only net negative 500 so far.  May benefit from low dose po lasix at discharge. Cardiology outpatient f/u

## 2023-10-24 NOTE — ASSESSMENT & PLAN NOTE

## 2023-10-24 NOTE — NURSING
Received report care assumed. Patient lying in bed resting, NAD noted. Safety precautions maintained.     [facility] [place]  Nurses Note -- 4 Eyes      10/23/2023       Skin assessed on: Q Shift      [x] No Pressure Injuries Present    [x]Prevention Measures Documented    [] Yes LDA  for Pressure Injury Previously documented     [] Yes New Pressure Injury Discovered   [] LDA for New Pressure Injury Added      Attending RN:  Adele Hayes LPN     Second RN:  Francisca Donohue RN

## 2023-10-24 NOTE — NURSING
[facility] [place]  Nurses Note -- 4 Eyes      10/24/2023       Skin assessed on: Q Shift      [x] No Pressure Injuries Present    [x]Prevention Measures Documented    [] Yes LDA  for Pressure Injury Previously documented     [] Yes New Pressure Injury Discovered   [] LDA for New Pressure Injury Added      Attending RN:  Sara Jordan RN     Second RN:  BRYANT Angulo

## 2023-10-25 PROCEDURE — 63600175 PHARM REV CODE 636 W HCPCS: Performed by: STUDENT IN AN ORGANIZED HEALTH CARE EDUCATION/TRAINING PROGRAM

## 2023-10-25 PROCEDURE — 25000003 PHARM REV CODE 250: Performed by: STUDENT IN AN ORGANIZED HEALTH CARE EDUCATION/TRAINING PROGRAM

## 2023-10-25 PROCEDURE — 94761 N-INVAS EAR/PLS OXIMETRY MLT: CPT

## 2023-10-25 PROCEDURE — 25000242 PHARM REV CODE 250 ALT 637 W/ HCPCS: Performed by: STUDENT IN AN ORGANIZED HEALTH CARE EDUCATION/TRAINING PROGRAM

## 2023-10-25 PROCEDURE — 99900026 HC AIRWAY MAINTENANCE (STAT)

## 2023-10-25 PROCEDURE — 99900035 HC TECH TIME PER 15 MIN (STAT)

## 2023-10-25 PROCEDURE — 21400001 HC TELEMETRY ROOM

## 2023-10-25 PROCEDURE — C9113 INJ PANTOPRAZOLE SODIUM, VIA: HCPCS | Performed by: STUDENT IN AN ORGANIZED HEALTH CARE EDUCATION/TRAINING PROGRAM

## 2023-10-25 PROCEDURE — 94640 AIRWAY INHALATION TREATMENT: CPT

## 2023-10-25 PROCEDURE — 27000221 HC OXYGEN, UP TO 24 HOURS

## 2023-10-25 RX ADMIN — IPRATROPIUM BROMIDE AND ALBUTEROL SULFATE 3 ML: 2.5; .5 SOLUTION RESPIRATORY (INHALATION) at 06:10

## 2023-10-25 RX ADMIN — GLYCOPYRROLATE 1 MG: 1 TABLET ORAL at 09:10

## 2023-10-25 RX ADMIN — PIPERACILLIN AND TAZOBACTAM 4.5 G: 4; .5 INJECTION, POWDER, LYOPHILIZED, FOR SOLUTION INTRAVENOUS; PARENTERAL at 09:10

## 2023-10-25 RX ADMIN — FLUOXETINE 20 MG: 20 CAPSULE ORAL at 09:10

## 2023-10-25 RX ADMIN — ATORVASTATIN CALCIUM 40 MG: 40 TABLET, FILM COATED ORAL at 09:10

## 2023-10-25 RX ADMIN — METOPROLOL TARTRATE 12.5 MG: 25 TABLET, FILM COATED ORAL at 09:10

## 2023-10-25 RX ADMIN — PIPERACILLIN AND TAZOBACTAM 4.5 G: 4; .5 INJECTION, POWDER, LYOPHILIZED, FOR SOLUTION INTRAVENOUS; PARENTERAL at 04:10

## 2023-10-25 RX ADMIN — DOXYCYCLINE HYCLATE 100 MG: 100 TABLET, COATED ORAL at 09:10

## 2023-10-25 RX ADMIN — SODIUM CHLORIDE SOLN NEBU 3% 4 ML: 3 NEBU SOLN at 08:10

## 2023-10-25 RX ADMIN — ASPIRIN 81 MG CHEWABLE TABLET 81 MG: 81 TABLET CHEWABLE at 09:10

## 2023-10-25 RX ADMIN — PANTOPRAZOLE SODIUM 40 MG: 40 INJECTION, POWDER, FOR SOLUTION INTRAVENOUS at 09:10

## 2023-10-25 RX ADMIN — PIPERACILLIN AND TAZOBACTAM 4.5 G: 4; .5 INJECTION, POWDER, LYOPHILIZED, FOR SOLUTION INTRAVENOUS; PARENTERAL at 11:10

## 2023-10-25 RX ADMIN — FOLIC ACID 1 MG: 1 TABLET ORAL at 09:10

## 2023-10-25 RX ADMIN — GLYCOPYRROLATE 1 MG: 1 TABLET ORAL at 04:10

## 2023-10-25 RX ADMIN — SODIUM CHLORIDE SOLN NEBU 3% 4 ML: 3 NEBU SOLN at 07:10

## 2023-10-25 RX ADMIN — MUPIROCIN: 20 OINTMENT TOPICAL at 09:10

## 2023-10-25 RX ADMIN — FUROSEMIDE 40 MG: 10 INJECTION, SOLUTION INTRAVENOUS at 09:10

## 2023-10-25 RX ADMIN — MELATONIN TAB 3 MG 6 MG: 3 TAB at 09:10

## 2023-10-25 NOTE — ASSESSMENT & PLAN NOTE
Patient with Hypoxic Respiratory failure which is Acute.  he is not on home oxygen. Supplemental oxygen was provided and noted- Oxygen Concentration (%):  [40] 40    .   Signs/symptoms of respiratory failure include- increased work of breathing and respiratory distress. Contributing diagnoses includes - CHF, COPD and Pneumonia Labs and images were reviewed. Patient Has recent ABG, which has been reviewed. Will treat underlying causes and adjust management of respiratory failure as follows-     -started on abx for possible pna with doxycycline and zosyn, procal negative and zosyn stopped.  -continue steroids   -continue diuresis, increase to lasix 40 mg IV bid for now. I/Os patient is around -500. Monitor closely  -pulmonology consulted.

## 2023-10-25 NOTE — PLAN OF CARE
Problem: Adult Inpatient Plan of Care  Goal: Plan of Care Review  Outcome: Ongoing, Progressing  Goal: Patient-Specific Goal (Individualized)  Outcome: Ongoing, Progressing  Goal: Absence of Hospital-Acquired Illness or Injury  Outcome: Ongoing, Progressing  Goal: Optimal Comfort and Wellbeing  Outcome: Ongoing, Progressing  Goal: Readiness for Transition of Care  Outcome: Ongoing, Progressing     Problem: Coping Ineffective  Goal: Effective Coping  Outcome: Ongoing, Progressing  Intervention: Support and Enhance Coping Strategies  Flowsheets (Taken 10/25/2023 1131)  Environmental Support: calm environment promoted     Problem: ARDS (Acute Respiratory Distress Syndrome)  Goal: Effective Oxygenation  Outcome: Ongoing, Progressing  Intervention: Optimize Oxygenation, Ventilation and Perfusion  Flowsheets (Taken 10/25/2023 1131)  Airway/Ventilation Management: airway patency maintained  Stabilization Measures: legs elevated

## 2023-10-25 NOTE — PT/OT/SLP PROGRESS
Physical Therapy      Patient Name:  Fareed Richard Jr.   MRN:  [id number]    Patient not seen today secondary to  (Pt and Nurse report that patient OOB to chair all morning and just returned to bed.  Pt declined participation in PT evaluation today, but requested return tomorrow.). Will follow-up .

## 2023-10-25 NOTE — PROGRESS NOTES
[facility] Medicine  Progress Note    Patient Name: Fareed Richard Jr.  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 Per independent historian, EMS, patient's SpO2 was 88-91% on arrival. Patient's baseline is 88-94%. 82% in exam room. Patient is on home O2 of 6L with tracheostomy collar, but oxygen requirement continued to increase in the ed up to needing vapotherm and trach collar for adequate oxygenation.       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 Per independent historian, EMS, patient's SpO2 was 88-91% on arrival. Patient's baseline is 88-94%. 82% in exam room. Patient is on home O2 of 6L with tracheostomy collar, but oxygen requirement continued to increase in the ed up to needing vapotherm and trach collar for adequate oxygenation. Pt started on tx for respiratory failure with steroids and diuresis. Aggressive airway care ordered as well. Pt weaned down to trach collar currently feeling much better and stable for transfer to the floor for further management. Still on 10 L HF trach collar, increased lasix and now at 8 L. Continue with diuresis and oxygen weaning.      Interval History: doing okay today, at 8 L HF today    Review of Systems  Objective:     Vital Signs (Most Recent):  Temp: 97.4 °F (36.3 °C) (10/25/23 1143)  Pulse: 69 (10/25/23  1143)  Resp: 20 (10/25/23 1143)  BP: (!) 114/58 (10/25/23 1143)  SpO2: (!) 91 % (10/25/23 1143) Vital Signs (24h Range):  Temp:  [96.7 °F (35.9 °C)-97.4 °F (36.3 °C)] 97.4 °F (36.3 °C)  Pulse:  [68-86] 69  Resp:  [18-20] 20  SpO2:  [91 %-98 %] 91 %  BP: (114-140)/(58-78) 114/58     Weight: 56.7 kg (125 lb)  Body mass index is 19.01 kg/m².    Intake/Output Summary (Last 24 hours) at 10/25/2023 1416  Last data filed at 10/25/2023 1207  Gross per 24 hour   Intake 610.89 ml   Output 1250 ml   Net -639.11 ml           Physical Exam  Vitals reviewed.   Constitutional:       General: He is not in acute distress.     Appearance: He is ill-appearing (chronic).   HENT:      Head: Normocephalic.   Cardiovascular:      Rate and Rhythm: Normal rate and regular rhythm.      Pulses: Normal pulses.   Pulmonary:      Effort: Pulmonary effort is normal.      Comments: Trach collar in place on 8 liters HF  Abdominal:      General: Bowel sounds are normal. There is no distension.   Musculoskeletal:         General: No swelling.   Skin:     General: Skin is warm and dry.   Neurological:      Mental Status: He is alert and oriented to person, place, and time. Mental status is at baseline.   Psychiatric:         Mood and Affect: Mood normal.         Thought Content: Thought content normal.             Significant Labs: All pertinent labs within the past 24 hours have been reviewed.    Significant Imaging: I have reviewed all pertinent imaging results/findings within the past 24 hours.      Assessment/Plan:      * Acute hypoxemic respiratory failure  Patient with Hypoxic Respiratory failure which is Acute.  he is not on home oxygen. Supplemental oxygen was provided and noted- Oxygen Concentration (%):  [40] 40    .   Signs/symptoms of respiratory failure include- increased work of breathing and respiratory distress. Contributing diagnoses includes - CHF, COPD and Pneumonia Labs and images were reviewed. Patient Has recent ABG, which has  been reviewed. Will treat underlying causes and adjust management of respiratory failure as follows-     -started on abx for possible pna with doxycycline and zosyn, procal negative and zosyn stopped.  -continue steroids   -continue diuresis, increase to lasix 40 mg IV bid for now. I/Os patient is around -500. Monitor closely  -pulmonology consulted.    Acute on chronic diastolic heart failure  Lasix 40 mg IV bid for now - monitor response. BNP >900 on admission and only net negative 500 so far.  May benefit from low dose po lasix at discharge. Cardiology outpatient f/u      PEG (percutaneous endoscopic gastrostomy) status  noted      Tracheostomy present  With pretty significant secretions noted. Trach care per nursing/RT    -pulmonology consulted    ACP (advance care planning)  Advance Care Planning     Date: 10/20/2023  Pt wants to be full code at this time. Has home palliative. Palliative care following. 3 minutes spent in acp conversations          COPD exacerbation  tx as stated above      Primary hypertension  Restarting home medications. Currently controlled      Coronary artery disease involving native coronary artery of native heart with unstable angina pectoris  Continue home medications.      Squamous cell cancer of tongue  noted        VTE Risk Mitigation (From admission, onward)         Ordered     IP VTE HIGH RISK PATIENT  Once         10/20/23 1404     Place sequential compression device  Until discontinued         10/20/23 1404     Place sequential compression device  Until discontinued         10/20/23 1214                Discharge Planning   NISA:      Code Status: Full Code   Is the patient medically ready for discharge?:     Reason for patient still in hospital (select all that apply): Treatment  Discharge Plan A: Home with family   Discharge Delays: None known at this time              Luiz Patel MD  Department of Hospital Medicine   [facility] [place] [facility]

## 2023-10-25 NOTE — SUBJECTIVE & OBJECTIVE
Interval History: doing okay today, at 8 L HF today    Review of Systems  Objective:     Vital Signs (Most Recent):  Temp: 97.4 °F (36.3 °C) (10/25/23 1143)  Pulse: 69 (10/25/23 1143)  Resp: 20 (10/25/23 1143)  BP: (!) 114/58 (10/25/23 1143)  SpO2: (!) 91 % (10/25/23 1143) Vital Signs (24h Range):  Temp:  [96.7 °F (35.9 °C)-97.4 °F (36.3 °C)] 97.4 °F (36.3 °C)  Pulse:  [68-86] 69  Resp:  [18-20] 20  SpO2:  [91 %-98 %] 91 %  BP: (114-140)/(58-78) 114/58     Weight: 56.7 kg (125 lb)  Body mass index is 19.01 kg/m².    Intake/Output Summary (Last 24 hours) at 10/25/2023 1416  Last data filed at 10/25/2023 1207  Gross per 24 hour   Intake 610.89 ml   Output 1250 ml   Net -639.11 ml           Physical Exam  Vitals reviewed.   Constitutional:       General: He is not in acute distress.     Appearance: He is ill-appearing (chronic).   HENT:      Head: Normocephalic.   Cardiovascular:      Rate and Rhythm: Normal rate and regular rhythm.      Pulses: Normal pulses.   Pulmonary:      Effort: Pulmonary effort is normal.      Comments: Trach collar in place on 8 liters HF  Abdominal:      General: Bowel sounds are normal. There is no distension.   Musculoskeletal:         General: No swelling.   Skin:     General: Skin is warm and dry.   Neurological:      Mental Status: He is alert and oriented to person, place, and time. Mental status is at baseline.   Psychiatric:         Mood and Affect: Mood normal.         Thought Content: Thought content normal.             Significant Labs: All pertinent labs within the past 24 hours have been reviewed.    Significant Imaging: I have reviewed all pertinent imaging results/findings within the past 24 hours.

## 2023-10-25 NOTE — NURSING
Pt resting comfortably, trach in place with oxygen.  Tube feeding continued. Meds given through PEG tube, water bolus administered.  Pt refused SCD's and wedge for positioning.   No distress noted, will continue to monitor.    [facility] [place] [place] -- 4 [place]      10/24/2023       Skin assessed on: Q Shift      [x] No Pressure Injuries Present    [x]Prevention Measures Documented    [] Yes LDA  for Pressure Injury Previously documented     [] Yes New Pressure Injury Discovered   [] LDA for New Pressure Injury Added      Attending RN:  Soco Coppola RN     Second RN:  FELICIA Finley

## 2023-10-25 NOTE — PLAN OF CARE
Chart check complete, pt resting comfortably, refused SCD's and wedge.  1 Bm this shift.  Tube feeding continued as ordered.  Trach collar intact.  IV infusing antibiotics as ordered.  No distress noted, will continue to monitor.      Problem: Adult Inpatient Plan of Care  Goal: Plan of Care Review  Outcome: Ongoing, Progressing     Problem: Adult Inpatient Plan of Care  Goal: Patient-Specific Goal (Individualized)  Outcome: Ongoing, Progressing     Problem: Adult Inpatient Plan of Care  Goal: Absence of Hospital-Acquired Illness or Injury  Outcome: Ongoing, Progressing     Problem: Adult Inpatient Plan of Care  Goal: Optimal Comfort and Wellbeing  Outcome: Ongoing, Progressing     Problem: Adjustment to Illness COPD (Chronic Obstructive Pulmonary Disease)  Goal: Optimal Chronic Illness Coping  Outcome: Ongoing, Progressing     Problem: Respiratory Compromise COPD (Chronic Obstructive Pulmonary Disease)  Goal: Effective Oxygenation and Ventilation  Outcome: Ongoing, Progressing     Problem: ARDS (Acute Respiratory Distress Syndrome)  Goal: Effective Oxygenation  Outcome: Ongoing, Progressing

## 2023-10-26 LAB
ANION GAP SERPL CALC-SCNC: 9 MMOL/L (ref 8–16)
BUN SERPL-MCNC: 47 MG/DL (ref 8–23)
CALCIUM SERPL-MCNC: 9.2 MG/DL (ref 8.7–10.5)
CHLORIDE SERPL-SCNC: 96 MMOL/L (ref 95–110)
CO2 SERPL-SCNC: 34 MMOL/L (ref 23–29)
CREAT SERPL-MCNC: 1.1 MG/DL (ref 0.5–1.4)
EST. GFR  (NO RACE VARIABLE): >60 ML/MIN/1.73 M^2
GLUCOSE SERPL-MCNC: 221 MG/DL (ref 70–110)
POTASSIUM SERPL-SCNC: 3.4 MMOL/L (ref 3.5–5.1)
SODIUM SERPL-SCNC: 139 MMOL/L (ref 136–145)

## 2023-10-26 PROCEDURE — 27000221 HC OXYGEN, UP TO 24 HOURS

## 2023-10-26 PROCEDURE — 94640 AIRWAY INHALATION TREATMENT: CPT

## 2023-10-26 PROCEDURE — 99900026 HC AIRWAY MAINTENANCE (STAT)

## 2023-10-26 PROCEDURE — 25000242 PHARM REV CODE 250 ALT 637 W/ HCPCS: Performed by: STUDENT IN AN ORGANIZED HEALTH CARE EDUCATION/TRAINING PROGRAM

## 2023-10-26 PROCEDURE — 36415 COLL VENOUS BLD VENIPUNCTURE: CPT | Performed by: STUDENT IN AN ORGANIZED HEALTH CARE EDUCATION/TRAINING PROGRAM

## 2023-10-26 PROCEDURE — 97161 PT EVAL LOW COMPLEX 20 MIN: CPT

## 2023-10-26 PROCEDURE — C9113 INJ PANTOPRAZOLE SODIUM, VIA: HCPCS | Performed by: STUDENT IN AN ORGANIZED HEALTH CARE EDUCATION/TRAINING PROGRAM

## 2023-10-26 PROCEDURE — 63600175 PHARM REV CODE 636 W HCPCS: Performed by: STUDENT IN AN ORGANIZED HEALTH CARE EDUCATION/TRAINING PROGRAM

## 2023-10-26 PROCEDURE — 99900035 HC TECH TIME PER 15 MIN (STAT)

## 2023-10-26 PROCEDURE — 21400001 HC TELEMETRY ROOM

## 2023-10-26 PROCEDURE — 94761 N-INVAS EAR/PLS OXIMETRY MLT: CPT

## 2023-10-26 PROCEDURE — 80048 BASIC METABOLIC PNL TOTAL CA: CPT | Performed by: STUDENT IN AN ORGANIZED HEALTH CARE EDUCATION/TRAINING PROGRAM

## 2023-10-26 PROCEDURE — 25000003 PHARM REV CODE 250: Performed by: STUDENT IN AN ORGANIZED HEALTH CARE EDUCATION/TRAINING PROGRAM

## 2023-10-26 PROCEDURE — 97110 THERAPEUTIC EXERCISES: CPT

## 2023-10-26 RX ADMIN — PIPERACILLIN AND TAZOBACTAM 4.5 G: 4; .5 INJECTION, POWDER, LYOPHILIZED, FOR SOLUTION INTRAVENOUS; PARENTERAL at 04:10

## 2023-10-26 RX ADMIN — FOLIC ACID 1 MG: 1 TABLET ORAL at 08:10

## 2023-10-26 RX ADMIN — FLUOXETINE 20 MG: 20 CAPSULE ORAL at 08:10

## 2023-10-26 RX ADMIN — PANTOPRAZOLE SODIUM 40 MG: 40 INJECTION, POWDER, FOR SOLUTION INTRAVENOUS at 08:10

## 2023-10-26 RX ADMIN — GLYCOPYRROLATE 1 MG: 1 TABLET ORAL at 10:10

## 2023-10-26 RX ADMIN — METOPROLOL TARTRATE 12.5 MG: 25 TABLET, FILM COATED ORAL at 08:10

## 2023-10-26 RX ADMIN — POTASSIUM BICARBONATE 50 MEQ: 977.5 TABLET, EFFERVESCENT ORAL at 12:10

## 2023-10-26 RX ADMIN — IPRATROPIUM BROMIDE AND ALBUTEROL SULFATE 3 ML: 2.5; .5 SOLUTION RESPIRATORY (INHALATION) at 07:10

## 2023-10-26 RX ADMIN — IPRATROPIUM BROMIDE AND ALBUTEROL SULFATE 3 ML: 2.5; .5 SOLUTION RESPIRATORY (INHALATION) at 03:10

## 2023-10-26 RX ADMIN — ASPIRIN 81 MG CHEWABLE TABLET 81 MG: 81 TABLET CHEWABLE at 08:10

## 2023-10-26 RX ADMIN — GLYCOPYRROLATE 1 MG: 1 TABLET ORAL at 08:10

## 2023-10-26 RX ADMIN — FUROSEMIDE 40 MG: 10 INJECTION, SOLUTION INTRAVENOUS at 08:10

## 2023-10-26 RX ADMIN — PIPERACILLIN AND TAZOBACTAM 4.5 G: 4; .5 INJECTION, POWDER, LYOPHILIZED, FOR SOLUTION INTRAVENOUS; PARENTERAL at 08:10

## 2023-10-26 RX ADMIN — SODIUM CHLORIDE SOLN NEBU 3% 4 ML: 3 NEBU SOLN at 08:10

## 2023-10-26 RX ADMIN — GLYCOPYRROLATE 1 MG: 1 TABLET ORAL at 04:10

## 2023-10-26 RX ADMIN — ATORVASTATIN CALCIUM 40 MG: 40 TABLET, FILM COATED ORAL at 10:10

## 2023-10-26 RX ADMIN — IPRATROPIUM BROMIDE AND ALBUTEROL SULFATE 3 ML: 2.5; .5 SOLUTION RESPIRATORY (INHALATION) at 08:10

## 2023-10-26 NOTE — PROGRESS NOTES
[facility] Medicine  Progress Note    Patient Name: Fareed Richard Jr.  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 Per independent historian, EMS, patient's SpO2 was 88-91% on arrival. Patient's baseline is 88-94%. 82% in exam room. Patient is on home O2 of 6L with tracheostomy collar, but oxygen requirement continued to increase in the ed up to needing vapotherm and trach collar for adequate oxygenation.       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 Per independent historian, EMS, patient's SpO2 was 88-91% on arrival. Patient's baseline is 88-94%. 82% in exam room. Patient is on home O2 of 6L with tracheostomy collar, but oxygen requirement continued to increase in the ed up to needing vapotherm and trach collar for adequate oxygenation. Pt started on tx for respiratory failure with steroids and diuresis. Aggressive airway care ordered as well. Pt weaned down to trach collar currently feeling much better and stable for transfer to the floor for further management. Still on 10 L HF trach collar, increased lasix and now at 8 L. Continue with diuresis and oxygen weaning.    S: No acute events overnigh. Still attempting to wean o2    O:  Vitals reviewed.   Constitutional:       General: He is not in acute distress.     Appearance: He is ill-appearing  (chronic). He is not toxic-appearing.   HENT:      Mouth/Throat:      Mouth: Mucous membranes are dry.      Pharynx: Oropharynx is clear.   Eyes:      General: No scleral icterus.     Extraocular Movements: Extraocular movements intact.   Cardiovascular:      Rate and Rhythm: Normal rate and regular rhythm.   Pulmonary:      Effort: Pulmonary effort is normal. No respiratory distress (on 8L).   Abdominal:      General: There is no distension.      Palpations: Abdomen is soft.      Tenderness: There is no abdominal tenderness. There is no guarding or rebound.   Musculoskeletal:         General: No swelling or tenderness.      Cervical back: Neck supple. No rigidity.   Skin:     General: Skin is warm and dry.   Neurological:      General: No focal deficit present.      Mental Status: He is alert and oriented to person, place, and time.   Psychiatric:         Mood and Affect: Mood normal.         Behavior: Behavior normal.       Assessment/Plan:      * Acute hypoxemic respiratory failure  Patient with Hypoxic Respiratory failure which is Acute.  he is not on home oxygen. Supplemental oxygen was provided and noted- Oxygen Concentration (%):  [60] 60    .   Signs/symptoms of respiratory failure include- increased work of breathing and respiratory distress. Contributing diagnoses includes - CHF, COPD and Pneumonia Labs and images were reviewed. Patient Has recent ABG, which has been reviewed. Will treat underlying causes and adjust management of respiratory failure as follows-     -started on abx for possible pna with doxycycline and zosyn, procal negative and zosyn stopped.  -continue steroids   -continue diuresis, increase to lasix 40 mg IV bid for now.   -pulmonology consulted.    Acute on chronic diastolic heart failure  Lasix 40 mg IV bid for now - monitor response. BNP >900 on admission  May benefit from low dose po lasix at discharge. Cardiology outpatient f/u      PEG (percutaneous endoscopic gastrostomy)  status  noted      Tracheostomy present  With pretty significant secretions noted. Trach care per nursing/RT    -pulmonology consulted    ACP (advance care planning)  Advance Care Planning     Date: 10/20/2023  Pt wants to be full code at this time. Has home palliative. Palliative care following. 3 minutes spent in acp conversations          COPD exacerbation  tx as stated above      Primary hypertension  Restarting home medications. Currently controlled      Coronary artery disease involving native coronary artery of native heart with unstable angina pectoris  Continue home medications.      Squamous cell cancer of tongue  noted        VTE Risk Mitigation (From admission, onward)           Ordered     IP VTE HIGH RISK PATIENT  Once         10/20/23 1404     Place sequential compression device  Until discontinued         10/20/23 1404     Place sequential compression device  Until discontinued         10/20/23 1214                    Discharge Planning   NISA:      Code Status: Full Code   Is the patient medically ready for discharge?:     Reason for patient still in hospital (select all that apply): Treatment and Consult recommendations  Discharge Plan A: Home with family   Discharge Delays: None known at this time              Nikita Castillo III, MD  Department of Hospital Medicine   [facility]

## 2023-10-26 NOTE — PT/OT/SLP EVAL
Physical Therapy Evaluation    Patient Name:  Fareed Richard Jr.   MRN:  [id number]    Recommendations:     Discharge Recommendations: Low Intensity Therapy   Discharge Equipment Recommendations: none   Barriers to discharge:  Increased O2 demand    Assessment:     Fareed Richard Jr. is a 73 y.o. male admitted with a medical diagnosis of Acute hypoxemic respiratory failure.  He presents with the following impairments/functional limitations: weakness, impaired balance, decreased safety awareness, impaired endurance, decreased coordination, impaired functional mobility, impaired coordination, gait instability .    Rehab Prognosis: Fair; patient would benefit from acute skilled PT services to address these deficits and reach maximum level of function.    Recent Surgery: * No surgery found *      Plan:     During this hospitalization, patient to be seen  (2-3x/wk) to address the identified rehab impairments via gait training, therapeutic activities, therapeutic exercises and progress toward the following goals:    Plan of Care Expires:  11/02/23    Subjective   Pt can mouth words over trach and is able to write if needed.  Answers/Nods Yes/No appropriately  Chief Complaint: No c/o  Patient/Family Comments/goals: Pt agreeable to eval, declined to remain up in chair following  Pain/Comfort:  Pain Rating 1: 0/10    Patients cultural, spiritual, Hindu conflicts given the current situation: no    Living Environment:  Pt lives with his spouse in a [facility] with 5STE, HR available  Prior to admission, patients level of function was Mod I with HH ambulation,  transfers, and ADL's.  Equipment used at home: shower chair, walker, rolling, wheelchair, hospital bed, oxygen, nutrition supplies, bedside commode, feeding device, suction machine, respiratory supplies.  DME owned (not currently used): none.  Upon discharge, patient will have assistance from Spouse.    Objective:     Communicated with nsg prior to session.  Patient found  HOB elevated with oxygen, Tracheostomy, PEG Tube, PureWick, telemetry, peripheral IV, bed alarm, pressure relief boots, SCD (Wedge R)  upon PT entry to room.    General Precautions: Standard, fall  Orthopedic Precautions:N/A   Braces: N/A  Respiratory Status:  8L/60% via trach collar    Exams:  Cognitive Exam:  Patient is oriented to Person, Place, Time, and Situation  Gross Motor Coordination:  Mildly impaired 2/2 weakness  Postural Exam:  Patient presented with the following abnormalities:    -       Rounded shoulders  -       Forward head  Sensation:    -       Intact  light/touch B LE's  Skin Integrity/Edema:      -       Skin integrity: Visible skin intact  -       Edema: None noted   RLE ROM: WFL  RLE Strength: WFL  LLE ROM: WFL  LLE Strength: WFL    Functional Mobility:  Bed Mobility:     Scooting: stand by assistance  Supine to Sit: stand by assistance  Sit to Supine: minimum assistance  Transfers:     Sit to Stand:  minimum assistance with rolling walker  Gait: Pt marched in place with RW and CGA and stepped 3x forward/backward/Left  with RW and CGA/Min A  Balance: Fair+ sit, Fair stand      AM-PAC 6 CLICK MOBILITY  Total Score:17       Treatment & Education:  Handout provided to and reviewed with pt or Seated Ap's, FAq's, and Marching, and AP's, TKe's, GS, and Hip Abd in supine/reclined.  Pt demonstrated good understanding.    Patient left HOB elevated with all lines intact, call button in reach, and PCT present.    GOALS:   Multidisciplinary Problems       Physical Therapy Goals          Problem: Physical Therapy    Goal Priority Disciplines Outcome Goal Variances Interventions   Physical Therapy Goal     PT, PT/OT Ongoing, Progressing     Description: Goals to be met by: 23     Patient will increase functional independence with mobility by performin. Supine to sit with Modified [place]  2. Sit to stand transfer with Modified [place]  3. Bed to chair transfer with Modified  [place]    4. Gait  x 10-15' feet with Supervision using Rolling Walker.   5. Lower extremity exercise program x10 reps per handout, with independence                         History:     Past Medical History:   Diagnosis Date    Cancer     skin to Rt forearm and face    COPD (chronic obstructive pulmonary disease)     Hyperlipidemia     Hypertension     Pseudoaneurysm        Past Surgical History:   Procedure Laterality Date    ABDOMINAL SURGERY      stents placed in liver and large intestines, per patient    ANGIOGRAPHY OF AORTIC ARCH  3/27/2023    Procedure: Aortogram, Aortic Arch;  Surgeon: Tim Bhatt MD;  Location: [facility] CATH LAB;  Service: Cardiology;;    AORTOGRAPHY WITH SERIALOGRAPHY N/A 3/27/2023    Procedure: AORTOGRAM, WITH SERIALOGRAPHY;  Surgeon: Tim Bhatt MD;  Location: [facility] CATH LAB;  Service: Cardiology;  Laterality: N/A;    CAROTID STENT      COLONOSCOPY N/A 09/27/2022    Procedure: COLONOSCOPY;  Surgeon: Donnie Peterson MD;  Location: [facility] ENDO (2ND FLR);  Service: Endoscopy;  Laterality: N/A;    COLONOSCOPY N/A 09/30/2022    Procedure: COLONOSCOPY;  Surgeon: Joy Cabrera MD;  Location: [facility] ENDO (2ND FLR);  Service: Endoscopy;  Laterality: N/A;    CORONARY STENT PLACEMENT  01/2000    DISSECTION OF NECK Bilateral 01/04/2022    Procedure: DISSECTION, NECK;  Surgeon: Naeem Smlals MD;  [facility];  Service: ENT;  Laterality: Bilateral;    ESOPHAGOGASTRODUODENOSCOPY N/A 09/27/2022    Procedure: EGD (ESOPHAGOGASTRODUODENOSCOPY);  Surgeon: Donnie Peterson MD;  Location: [facility] ENDO (2ND FLR);  Service: Endoscopy;  Laterality: N/A;    ESOPHAGOGASTRODUODENOSCOPY N/A 09/30/2022    Procedure: EGD (ESOPHAGOGASTRODUODENOSCOPY);  Surgeon: Joy Cabrera MD;  Location: [facility] ENDO (2ND FLR);  Service: Endoscopy;  Laterality: N/A;    ESOPHAGOGASTRODUODENOSCOPY W/ PEG N/A 01/04/2022    Procedure: EGD, WITH PEG TUBE INSERTION;  Surgeon: Anthony Calabrese MD;  Location: [facility] OR  2ND FLR;  Service: General;  Laterality: N/A;    EYE SURGERY      Cataract bilateral    femoral stents      bilateral    FLAP PROCEDURE N/A 01/03/2022    Procedure: CREATION, FREE FLAP;  Surgeon: Naeem Smalls MD;  Location: [facility] OR [facility] FLR;  Service: ENT;  Laterality: N/A;    FLAP PROCEDURE Right 01/04/2022    Procedure: CREATION, FREE FLAP;  Surgeon: Stacey Conde MD;  [facility];  Service: ENT;  Laterality: Right;  Ischemic start 1203  Ischemic stop 1353    GLOSSECTOMY N/A 01/04/2022    Procedure: GLOSSECTOMY;  Surgeon: Naeem Smalls MD;  Location: [facility] OR [facility] FLR;  Service: ENT;  Laterality: N/A;    LEFT HEART CATHETERIZATION Left 3/23/2023    Procedure: Left heart cath;  Surgeon: Tacos Benitez MD;  Location: [facility] CATH LAB;  Service: Cardiology;  Laterality: Left;    PERCUTANEOUS TRANSLUMINAL BALLOON ANGIOPLASTY OF CORONARY ARTERY Left 3/27/2023    Procedure: Angioplasty-coronary;  Surgeon: Tim Bhatt MD;  Location: [facility] CATH LAB;  Service: Cardiology;  Laterality: Left;  not before 9am, R rad access, 6 Fr EBU 3.5 guide    RADICAL NECK DISSECTION Left 01/03/2022    Procedure: DISSECTION, NECK, RADICAL;  Surgeon: Naeem Smalls MD;  [facility];  Service: ENT;  Laterality: Left;    SKIN BIOPSY      SKIN CANCER EXCISION      STENT, CORONARY, MULTI VESSEL  3/27/2023    Procedure: Stent, Coronary, Multi Vessel;  Surgeon: Tim Bhatt MD;  Location: [facility] CATH LAB;  Service: Cardiology;;    TRACHEOTOMY N/A 01/04/2022    Procedure: TRACHEOTOMY;  Surgeon: Naeem Smalls MD;  [facility];  Service: ENT;  Laterality: N/A;    VASCULAR SURGERY         Time Tracking:     PT Received On: 10/26/23  PT Start Time: 1050     PT Stop Time: 1113  PT Total Time (min): 23 min     Billable Minutes: Evaluation 15 and Therapeutic Exercise 8      10/26/2023

## 2023-10-26 NOTE — ASSESSMENT & PLAN NOTE
Patient with Hypoxic Respiratory failure which is Acute.  he is not on home oxygen. Supplemental oxygen was provided and noted- Oxygen Concentration (%):  [60] 60    .   Signs/symptoms of respiratory failure include- increased work of breathing and respiratory distress. Contributing diagnoses includes - CHF, COPD and Pneumonia Labs and images were reviewed. Patient Has recent ABG, which has been reviewed. Will treat underlying causes and adjust management of respiratory failure as follows-     -started on abx for possible pna with doxycycline and zosyn, procal negative and zosyn stopped.  -continue steroids   -continue diuresis, increase to lasix 40 mg IV bid for now.   -pulmonology consulted.

## 2023-10-26 NOTE — PLAN OF CARE
Problem: Physical Therapy  Goal: Physical Therapy Goal  Description: Goals to be met by: 23     Patient will increase functional independence with mobility by performin. Supine to sit with Modified [place]  2. Sit to stand transfer with Modified [place]  3. Bed to chair transfer with Modified [place]    4. Gait  x 10-15' feet with Supervision using Rolling Walker.   5. Lower extremity exercise program x10 reps per handout, with independence    Outcome: Ongoing, Progressing   Initial PT evaluation performed today.  Pt could benefit from skilled PT services 2-3x/wk in order to maximize function prior to D/C.  Low Intensity therapy recommended at time of D/C.  DME in place.

## 2023-10-26 NOTE — ASSESSMENT & PLAN NOTE
Lasix 40 mg IV bid for now - monitor response. BNP >900 on admission  May benefit from low dose po lasix at discharge. Cardiology outpatient f/u

## 2023-10-26 NOTE — PLAN OF CARE
Pt A&Ox4, free from falls/injury, and able to make needs known during shift. VSS on trach collar. Pt had no c/o pain during shift. Telemetry monitoring continued on box #[id number], visible and audible on monitor at nurses' station, no acute distress noted. Bed locked and in lowest position. Bedside table and call light in reach. Will cont to monitor.     Problem: Adult Inpatient Plan of Care  Goal: Plan of Care Review  Outcome: Ongoing, Progressing  Goal: Patient-Specific Goal (Individualized)  Outcome: Ongoing, Progressing  Goal: Absence of Hospital-Acquired Illness or Injury  Outcome: Ongoing, Progressing  Goal: Optimal Comfort and Wellbeing  Outcome: Ongoing, Progressing  Goal: Readiness for Transition of Care  Outcome: Ongoing, Progressing     Problem: Adjustment to Illness COPD (Chronic Obstructive Pulmonary Disease)  Goal: Optimal Chronic Illness Coping  Outcome: Ongoing, Progressing  Intervention: Support and Optimize Psychosocial Response  Flowsheets (Taken 10/26/2023 1255)  Supportive Measures: active listening utilized     Problem: Functional Ability Impaired COPD (Chronic Obstructive Pulmonary Disease)  Goal: Optimal Level of Functional [place]  Outcome: Ongoing, Progressing  Intervention: Optimize Functional Ability  Flowsheets (Taken 10/26/2023 1255)  Self-Care Promotion: BADL personal objects within reach  Activity Management:   Arm raise - L1   Leg kicks - L2   Rolling - L1  Environmental Support:   calm environment promoted   environmental consistency promoted     Problem: Infection COPD (Chronic Obstructive Pulmonary Disease)  Goal: Absence of Infection Signs and Symptoms  Outcome: Ongoing, Progressing     Problem: Oral Intake Inadequate COPD (Chronic Obstructive Pulmonary Disease)  Goal: Improved Nutrition Intake  Outcome: Ongoing, Progressing     Problem: Respiratory Compromise COPD (Chronic Obstructive Pulmonary Disease)  Goal: Effective Oxygenation and Ventilation  Outcome: Ongoing,  Progressing  Intervention: Promote Airway Secretion Clearance  Flowsheets (Taken 10/26/2023 1255)  Activity Management:   Arm raise - L1   Leg kicks - L2   Rolling - L1  Intervention: Optimize Oxygenation and Ventilation  Flowsheets (Taken 10/26/2023 1255)  Head of Bed (HOB) Positioning: HOB elevated     Problem: Coping Ineffective  Goal: Effective Coping  Outcome: Ongoing, Progressing     Problem: Skin Injury Risk Increased  Goal: Skin Health and Integrity  Outcome: Ongoing, Progressing  Intervention: Optimize Skin Protection  Flowsheets (Taken 10/26/2023 1255)  Pressure Reduction Techniques:   frequent weight shift encouraged   heels elevated off bed  Skin Protection: tubing/devices free from skin contact  Head of Bed (HOB) Positioning: HOB elevated     Problem: ARDS (Acute Respiratory Distress Syndrome)  Goal: Effective Oxygenation  Outcome: Ongoing, Progressing

## 2023-10-27 PROBLEM — K94.23 PEG TUBE MALFUNCTION: Status: ACTIVE | Noted: 2023-10-27

## 2023-10-27 LAB
POCT GLUCOSE: 116 MG/DL (ref 70–110)
POCT GLUCOSE: 125 MG/DL (ref 70–110)
POCT GLUCOSE: 134 MG/DL (ref 70–110)
POCT GLUCOSE: 134 MG/DL (ref 70–110)
POCT GLUCOSE: 215 MG/DL (ref 70–110)

## 2023-10-27 PROCEDURE — 94761 N-INVAS EAR/PLS OXIMETRY MLT: CPT

## 2023-10-27 PROCEDURE — 63600175 PHARM REV CODE 636 W HCPCS: Performed by: STUDENT IN AN ORGANIZED HEALTH CARE EDUCATION/TRAINING PROGRAM

## 2023-10-27 PROCEDURE — 27000221 HC OXYGEN, UP TO 24 HOURS

## 2023-10-27 PROCEDURE — 25000003 PHARM REV CODE 250: Performed by: STUDENT IN AN ORGANIZED HEALTH CARE EDUCATION/TRAINING PROGRAM

## 2023-10-27 PROCEDURE — 99223 1ST HOSP IP/OBS HIGH 75: CPT | Mod: 25,,, | Performed by: NURSE PRACTITIONER

## 2023-10-27 PROCEDURE — 94640 AIRWAY INHALATION TREATMENT: CPT

## 2023-10-27 PROCEDURE — 99223 PR INITIAL HOSPITAL CARE,LEVL III: ICD-10-PCS | Mod: 25,,, | Performed by: NURSE PRACTITIONER

## 2023-10-27 PROCEDURE — 25000242 PHARM REV CODE 250 ALT 637 W/ HCPCS: Performed by: STUDENT IN AN ORGANIZED HEALTH CARE EDUCATION/TRAINING PROGRAM

## 2023-10-27 PROCEDURE — 43762 RPLC GTUBE NO REVJ TRC: CPT | Mod: ,,, | Performed by: INTERNAL MEDICINE

## 2023-10-27 PROCEDURE — 99900026 HC AIRWAY MAINTENANCE (STAT)

## 2023-10-27 PROCEDURE — 43762 PR REPLACE, GASTRO TUBE, PERCUTANEOUS, W/O REV OF GASTRO TRACT: ICD-10-PCS | Mod: ,,, | Performed by: INTERNAL MEDICINE

## 2023-10-27 PROCEDURE — 21400001 HC TELEMETRY ROOM

## 2023-10-27 PROCEDURE — 25500020 PHARM REV CODE 255: Performed by: STUDENT IN AN ORGANIZED HEALTH CARE EDUCATION/TRAINING PROGRAM

## 2023-10-27 PROCEDURE — C9113 INJ PANTOPRAZOLE SODIUM, VIA: HCPCS | Performed by: STUDENT IN AN ORGANIZED HEALTH CARE EDUCATION/TRAINING PROGRAM

## 2023-10-27 PROCEDURE — 99900035 HC TECH TIME PER 15 MIN (STAT)

## 2023-10-27 RX ORDER — HYDROCODONE BITARTRATE AND ACETAMINOPHEN 5; 325 MG/1; MG/1
1 TABLET ORAL EVERY 6 HOURS PRN
Status: DISCONTINUED | OUTPATIENT
Start: 2023-10-27 | End: 2023-10-28 | Stop reason: HOSPADM

## 2023-10-27 RX ADMIN — PIPERACILLIN AND TAZOBACTAM 4.5 G: 4; .5 INJECTION, POWDER, LYOPHILIZED, FOR SOLUTION INTRAVENOUS; PARENTERAL at 07:10

## 2023-10-27 RX ADMIN — DIATRIZOATE MEGLUMINE AND DIATRIZOATE SODIUM 30 ML: 660; 100 LIQUID ORAL; RECTAL at 11:10

## 2023-10-27 RX ADMIN — IPRATROPIUM BROMIDE AND ALBUTEROL SULFATE 3 ML: 2.5; .5 SOLUTION RESPIRATORY (INHALATION) at 08:10

## 2023-10-27 RX ADMIN — FUROSEMIDE 40 MG: 10 INJECTION, SOLUTION INTRAVENOUS at 08:10

## 2023-10-27 RX ADMIN — MELATONIN TAB 3 MG 6 MG: 3 TAB at 09:10

## 2023-10-27 RX ADMIN — ATORVASTATIN CALCIUM 40 MG: 40 TABLET, FILM COATED ORAL at 09:10

## 2023-10-27 RX ADMIN — PIPERACILLIN AND TAZOBACTAM 4.5 G: 4; .5 INJECTION, POWDER, LYOPHILIZED, FOR SOLUTION INTRAVENOUS; PARENTERAL at 12:10

## 2023-10-27 RX ADMIN — HYDROCODONE BITARTRATE AND ACETAMINOPHEN 1 TABLET: 5; 325 TABLET ORAL at 09:10

## 2023-10-27 RX ADMIN — SODIUM CHLORIDE SOLN NEBU 3% 4 ML: 3 NEBU SOLN at 08:10

## 2023-10-27 RX ADMIN — PIPERACILLIN AND TAZOBACTAM 4.5 G: 4; .5 INJECTION, POWDER, LYOPHILIZED, FOR SOLUTION INTRAVENOUS; PARENTERAL at 03:10

## 2023-10-27 RX ADMIN — HYDROCODONE BITARTRATE AND ACETAMINOPHEN 1 TABLET: 5; 325 TABLET ORAL at 03:10

## 2023-10-27 RX ADMIN — GLYCOPYRROLATE 1 MG: 1 TABLET ORAL at 09:10

## 2023-10-27 RX ADMIN — METOPROLOL TARTRATE 12.5 MG: 25 TABLET, FILM COATED ORAL at 09:10

## 2023-10-27 RX ADMIN — PANTOPRAZOLE SODIUM 40 MG: 40 INJECTION, POWDER, FOR SOLUTION INTRAVENOUS at 08:10

## 2023-10-27 RX ADMIN — GLYCOPYRROLATE 1 MG: 1 TABLET ORAL at 03:10

## 2023-10-27 NOTE — NURSING
[facility] [place] -- 4 Eyes      10-26-23      Skin assessed on: Q Shift      [x] No Pressure Injuries Present    [x]Prevention Measures Documented    [] Yes LDA  for Pressure Injury Previously documented     [] Yes New Pressure Injury Discovered   [] LDA for New Pressure Injury Added      Attending RN:  Yanelis Tao RN     Second RN:  Sara Jordan RN

## 2023-10-27 NOTE — PLAN OF CARE
[facility] GI Note    Damaged PEG removed at the bedside and replaced with a 20Fr externally removable PEG tube.  KUB ordered to confirm placement.      Addendum:  PEG tube confirmed to be in the stomach on KUB.    Ok to use PEG tube.    The GI team will sign off, please call with questions or concerns.    Belia Pugh MD

## 2023-10-27 NOTE — PROGRESS NOTES
[facility] Medicine  Progress Note    Patient Name: Fareed Richard Jr.  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 Per independent historian, EMS, patient's SpO2 was 88-91% on arrival. Patient's baseline is 88-94%. 82% in exam room. Patient is on home O2 of 6L with tracheostomy collar, but oxygen requirement continued to increase in the ed up to needing vapotherm and trach collar for adequate oxygenation.       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 Per independent historian, EMS, patient's SpO2 was 88-91% on arrival. Patient's baseline is 88-94%. 82% in exam room. Patient is on home O2 of 6L with tracheostomy collar, but oxygen requirement continued to increase in the ed up to needing vapotherm and trach collar for adequate oxygenation. Pt started on tx for respiratory failure with steroids and diuresis. Aggressive airway care ordered as well. Pt weaned down to trach collar currently feeling much better and stable for transfer to the floor for further management. Still on 10 L HF trach collar, increased lasix and now at 8 L. Continue with diuresis and oxygen weaning.      Interval History: PEG tube issues overnight. Pending GI consult. No cp or sob at this time. Pt frustrated to still be at the hospital.    Review of Systems  Objective:     Vital  Signs (Most Recent):  Temp: 97.2 °F (36.2 °C) (10/27/23 0743)  Pulse: 76 (10/27/23 0803)  Resp: 16 (10/27/23 0803)  BP: 121/69 (10/27/23 0743)  SpO2: 98 % (10/27/23 0803) Vital Signs (24h Range):  Temp:  [97.2 °F (36.2 °C)-97.7 °F (36.5 °C)] 97.2 °F (36.2 °C)  Pulse:  [76-86] 76  Resp:  [16-20] 16  SpO2:  [92 %-98 %] 98 %  BP: (106-131)/(57-69) 121/69     Weight: 56.7 kg (125 lb)  Body mass index is 19.01 kg/m².    Intake/Output Summary (Last 24 hours) at 10/27/2023 1044  Last data filed at 10/27/2023 0446  Gross per 24 hour   Intake 1470.86 ml   Output 1200 ml   Net 270.86 ml         Physical Exam  Vitals reviewed.   Constitutional:       General: He is not in acute distress.     Appearance: He is ill-appearing (chronic). He is not toxic-appearing.   HENT:      Mouth/Throat:      Mouth: Mucous membranes are dry.      Pharynx: Oropharynx is clear.   Eyes:      General: No scleral icterus.     Extraocular Movements: Extraocular movements intact.   Cardiovascular:      Rate and Rhythm: Normal rate and regular rhythm.   Pulmonary:      Effort: Pulmonary effort is normal. No respiratory distress (on 8L).   Abdominal:      General: There is no distension.      Palpations: Abdomen is soft.      Tenderness: There is no abdominal tenderness. There is no guarding or rebound.   Musculoskeletal:         General: No swelling or tenderness.      Cervical back: Neck supple. No rigidity.   Skin:     General: Skin is warm and dry.   Neurological:      General: No focal deficit present.      Mental Status: He is alert and oriented to person, place, and time.   Psychiatric:         Mood and Affect: Mood normal.         Behavior: Behavior normal.             Significant Labs: All pertinent labs within the past 24 hours have been reviewed.    Significant Imaging: I have reviewed all pertinent imaging results/findings within the past 24 hours.      Assessment/Plan:      * Acute hypoxemic respiratory failure  Patient with Hypoxic  Respiratory failure which is Acute.  he is not on home oxygen. Supplemental oxygen was provided and noted- Oxygen Concentration (%):  [60] 60    .   Signs/symptoms of respiratory failure include- increased work of breathing and respiratory distress. Contributing diagnoses includes - CHF, COPD and Pneumonia Labs and images were reviewed. Patient Has recent ABG, which has been reviewed. Will treat underlying causes and adjust management of respiratory failure as follows-     -started on abx for possible pna with doxycycline and zosyn, completes 7 day course today would d/c tomorrow  -completed course of steroids. PRN breathing tx.  -continue diuresis, increase to lasix 40 mg IV bid for now. Renal function stable   -pulmonology consulted.  -working to wean down to home o2 of 5-6L on 8L currently.    Acute on chronic diastolic heart failure  Lasix 40 mg IV bid for now - monitor response. BNP >900 on admission  May benefit from low dose po lasix at discharge. Cardiology outpatient f/u      PEG (percutaneous endoscopic gastrostomy) status  Pt with PEG tube dysfunction overnight of 10/27 requiring tube feeds to be stopped. GI consulted. Possibly will need PEG tube exchanged.      Tracheostomy present  With pretty significant secretions noted. Trach care per nursing/RT    -pulmonology consulted    ACP (advance care planning)  Advance Care Planning     Date: 10/20/2023  Pt wants to be full code at this time. Has home palliative. Palliative care following. 3 minutes spent in acp conversations          COPD exacerbation  Completed tx at this point continue breathing tx prn. 1 more day of abx      Primary hypertension  Restarting home medications. Currently controlled      Coronary artery disease involving native coronary artery of native heart with unstable angina pectoris  Continue home medications.      Squamous cell cancer of tongue  noted        VTE Risk Mitigation (From admission, onward)         Ordered     IP VTE HIGH  RISK PATIENT  Once         10/20/23 1404     Place sequential compression device  Until discontinued         10/20/23 1404     Place sequential compression device  Until discontinued         10/20/23 1214                Discharge Planning   NISA:      Code Status: Full Code   Is the patient medically ready for discharge?:     Reason for patient still in hospital (select all that apply): Treatment and Consult recommendations  Discharge Plan A: Home with family   Discharge Delays: None known at this time              Nikita Castillo III, MD  Department of Hospital Medicine   [facility]

## 2023-10-27 NOTE — NURSING
Patient pressed the call light for assistance. He pointed to the peg for me to look at the peg, it was leaking from the middle of the tubing line with a torn like appearance in the middle of the tubing. Peg site intact.  Stopped the tube feeding, notified Parul Rausch NP of the above. NP came in to see the patient and new orders were placed. Checked ; continue to check  as ordered No distress noted.

## 2023-10-27 NOTE — PT/OT/SLP PROGRESS
Physical Therapy      Patient Name:  Fareed Richard Jr.   MRN:  [id number]    Patient not seen today secondary to Other (pt was not available, having x-ray done). Will follow-up as able.    2nd attempt at 13:00, Patient not seen today secondary to pt fatigue and unwilling to participate, pt just finished with his breathing treatment and also had a busy morning with PEG removal due malfunction and received new PEG placement, pt said he has been doing BLE ex, pt politely declined therapy and wanted to rest, nurse notified. Will follow-up as able.

## 2023-10-27 NOTE — PLAN OF CARE
10/26/23 1330   Medicare Message   Important Message from Medicare regarding Discharge Appeal Rights Given to patient/caregiver;Explained to patient/caregiver;Signed/date by patient/caregiver;Other (comments)   Date IMM was signed 10/26/23   Time IMM was signed 1330     Allow copy in chart.

## 2023-10-27 NOTE — PLAN OF CARE
Case Management  Re-Assessment     PCP: Kodi Tubbs MD    Existing Help at Home: Spouse    Barriers to Discharge: Wean oxygen needs irma.    Discharge Plan:    A. Home with family   B. Home with family and home health      In bed utilizing writing on tablet and mouthing would to communicate needs.  Lives spouse who helps patient.  Says he ambulates at home uses a eriberto walker.  Patient has a tracheostomy, has a suction machine and oxygen concentrator from [employer].  Usually is on 4L/nc at home, today is 5l plan to wean down O2.  [facility] for pcp [phone].       Patient may benefit from Home health and OPCM.       10/26/23 1340   Discharge Reassessment   Assessment Type Discharge Planning Reassessment   Did the patient's condition or plan change since previous assessment? Yes   Discharge Plan discussed with: Patient   Communicated NISA with patient/caregiver Date not available/Unable to determine   Discharge Plan A Home with family   Discharge Plan B Home Health;Home with family   DME Needed Upon Discharge  none   Transition of Care Barriers None   Why the patient remains in the hospital Requires continued medical care   Post-Acute Status   Coverage PHN   Discharge Delays None known at this time

## 2023-10-27 NOTE — ASSESSMENT & PLAN NOTE
Pt with PEG tube dysfunction overnight of 10/27 requiring tube feeds to be stopped. GI consulted. Possibly will need PEG tube exchanged.

## 2023-10-27 NOTE — PLAN OF CARE
Patient is NPO. TF on hold pending consult to GI as ordered. No complaints of pain, SOB. Trach care and resp tx per Resp therapy. No N/V.  0413 .  Bed alarm on call light in reach. Able to make needs known via pad or mouthing. Free of falls. No distress noted. Continue with plan of care as ordered.   Problem: Adult Inpatient Plan of Care  Goal: Plan of Care Review  Outcome: Ongoing, Progressing  Goal: Patient-Specific Goal (Individualized)  Outcome: Ongoing, Progressing  Goal: Optimal Comfort and Wellbeing  Outcome: Ongoing, Progressing  Goal: Readiness for Transition of Care  Outcome: Ongoing, Progressing     Problem: Adjustment to Illness COPD (Chronic Obstructive Pulmonary Disease)  Goal: Optimal Chronic Illness Coping  Outcome: Ongoing, Progressing     Problem: Coping Ineffective  Goal: Effective Coping  Outcome: Ongoing, Progressing     Problem: Skin Injury Risk Increased  Goal: Skin Health and Integrity  Outcome: Ongoing, Progressing

## 2023-10-27 NOTE — NURSING
[facility] [place]  Nurses Note -- 4 Eyes      10/27/2023       Skin assessed on: Q Shift      [x] No Pressure Injuries Present    []Prevention Measures Documented    [] Yes LDA  for Pressure Injury Previously documented     [] Yes New Pressure Injury Discovered   [] LDA for New Pressure Injury Added      Attending RN:  Ysabel Larson RN     Second RN:  Yanelis Tao RN

## 2023-10-27 NOTE — ASSESSMENT & PLAN NOTE
Patient with Hypoxic Respiratory failure which is Acute.  he is not on home oxygen. Supplemental oxygen was provided and noted- Oxygen Concentration (%):  [60] 60    .   Signs/symptoms of respiratory failure include- increased work of breathing and respiratory distress. Contributing diagnoses includes - CHF, COPD and Pneumonia Labs and images were reviewed. Patient Has recent ABG, which has been reviewed. Will treat underlying causes and adjust management of respiratory failure as follows-     -started on abx for possible pna with doxycycline and zosyn, completes 7 day course today would d/c tomorrow  -completed course of steroids. PRN breathing tx.  -continue diuresis, increase to lasix 40 mg IV bid for now. Renal function stable   -pulmonology consulted.  -working to wean down to home o2 of 5-6L on 8L currently.

## 2023-10-27 NOTE — CONSULTS
[facility] Consultation Note    Patient Complaint: PEG ripped    PCP:   Kodi Tubbs       LOS: 7        Initial History of Present Illness (HPI):  This is a 73 y.o. male consulted to GI service for PEG tube line torn. 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 Patient complaint of PEG malfunction with inability to receive tube feedings and medications via PEG tube that occurred overnight. Denies n/v, abdominal pain, bleeding or drainage at peg site. Nursing reports peg seems to be twisted and ripped ~4 inches away from infusion port.     Admit labs k 3.4        Medical History:  has a past medical history of Cancer, COPD (chronic obstructive pulmonary disease), Hyperlipidemia, Hypertension, and Pseudoaneurysm.    Surgical History:  has a past surgical history that includes Skin cancer excision; Coronary stent placement (01/2000); Eye surgery; Abdominal surgery; Carotid stent; femoral stents; Radical neck dissection (Left, 01/03/2022); Flap procedure (N/A, 01/03/2022); Glossectomy (N/A, 01/04/2022); Tracheotomy (N/A, 01/04/2022); Dissection of neck (Bilateral, 01/04/2022); Esophagogastroduodenoscopy w/ PEG (N/A, 01/04/2022); Flap procedure (Right, 01/04/2022); Esophagogastroduodenoscopy (N/A, 09/27/2022); Colonoscopy (N/A, 09/27/2022); Esophagogastroduodenoscopy (N/A, 09/30/2022); Colonoscopy (N/A, 09/30/2022); Vascular surgery; Skin biopsy; Percutaneous transluminal balloon angioplasty of coronary artery (Left, 3/27/2023); Angiography of aortic arch (3/27/2023); Aortography with serialography (N/A, 3/27/2023); stent, coronary, multi vessel (3/27/2023); and Left heart catheterization (Left, 3/23/2023).      Objective Findings:    Vital Signs:  Temp:  [97.2 °F (36.2 °C)-97.7 °F (36.5 °C)]   Pulse:  [76-86]   Resp:  [16-20]   BP: (106-131)/(57-69)   SpO2:  [92 %-98 %]   Body mass index is 19.01 kg/m².      Physical Exam  Vitals and  nursing note reviewed.   Constitutional:       Appearance: He is ill-appearing.   HENT:      Head: Normocephalic.   Pulmonary:      Effort: Pulmonary effort is normal.      Comments: Trach in place  Abdominal:      General: Bowel sounds are normal.      Palpations: Abdomen is soft.      Comments: PEG in place- malfunctioning   Skin:     General: Skin is warm and dry.   Neurological:      Mental Status: He is alert and oriented to person, place, and time.   Psychiatric:         Mood and Affect: Mood normal.         Behavior: Behavior normal.         Thought Content: Thought content normal.         Judgment: Judgment normal.               Labs:  Lab Results   Component Value Date    WBC 6.03 10/24/2023    HGB 10.6 (L) 10/24/2023    HCT 36.0 (L) 10/24/2023     10/24/2023    CHOL 85 (L) 08/29/2023    TRIG 68 08/29/2023    HDL 25 (L) 08/29/2023    ALT 14 10/24/2023    AST 10 10/24/2023     10/26/2023    K 3.4 (L) 10/26/2023    CL 96 10/26/2023    CREATININE 1.1 10/26/2023    BUN 47 (H) 10/26/2023    CO2 34 (H) 10/26/2023    TSH 4.460 (H) 08/29/2023    INR 1.0 04/02/2023    HGBA1C 5.8 (H) 08/29/2023         Endoscopy: 9/2022 EGD- LA Grade B reflux esophagitis with no bleeding.                          - Erythematous mucosa in the antrum.                          - Normal duodenal bulb, first portion of the                          duodenum and second portion of the duodenum.                          - No specimens collected.     I have independently reviewed and interpreted the imaging above           PEG malfunction  Plan/ Recommendations:  1.  Original outside PEG lumen worn and thinned with lumen torn 75% across. Plan for bedside exchange by Dr Pugh with 20fr externally exchangeable PEG.        Thank you so much for allowing us to participate in the care of Fareed WILLIAM Jose Manuel Grande . Please contact us if you have any additional questions.    Belinda Meza NP  Gastroenterology  [facility] [place] [facility]

## 2023-10-27 NOTE — SIGNIFICANT EVENT
Nurse called stating she had to stop tube feeding due to peg-tube line torn. Orders to hold tube feeding, consulted GI, and blood glucose morning every 4 hours while no tube feeding.

## 2023-10-28 VITALS
DIASTOLIC BLOOD PRESSURE: 95 MMHG | HEART RATE: 71 BPM | HEIGHT: 68 IN | BODY MASS INDEX: 18.94 KG/M2 | TEMPERATURE: 98 F | OXYGEN SATURATION: 93 % | SYSTOLIC BLOOD PRESSURE: 121 MMHG | RESPIRATION RATE: 16 BRPM | WEIGHT: 125 LBS

## 2023-10-28 LAB
POCT GLUCOSE: 132 MG/DL (ref 70–110)
POCT GLUCOSE: 164 MG/DL (ref 70–110)

## 2023-10-28 PROCEDURE — 27000221 HC OXYGEN, UP TO 24 HOURS

## 2023-10-28 PROCEDURE — C9113 INJ PANTOPRAZOLE SODIUM, VIA: HCPCS | Performed by: STUDENT IN AN ORGANIZED HEALTH CARE EDUCATION/TRAINING PROGRAM

## 2023-10-28 PROCEDURE — 94761 N-INVAS EAR/PLS OXIMETRY MLT: CPT

## 2023-10-28 PROCEDURE — 25000003 PHARM REV CODE 250: Performed by: STUDENT IN AN ORGANIZED HEALTH CARE EDUCATION/TRAINING PROGRAM

## 2023-10-28 PROCEDURE — 63600175 PHARM REV CODE 636 W HCPCS: Performed by: STUDENT IN AN ORGANIZED HEALTH CARE EDUCATION/TRAINING PROGRAM

## 2023-10-28 PROCEDURE — 25000242 PHARM REV CODE 250 ALT 637 W/ HCPCS: Performed by: STUDENT IN AN ORGANIZED HEALTH CARE EDUCATION/TRAINING PROGRAM

## 2023-10-28 PROCEDURE — 94640 AIRWAY INHALATION TREATMENT: CPT

## 2023-10-28 RX ORDER — TALC
3 POWDER (GRAM) TOPICAL NIGHTLY
Refills: 0 | Status: ON HOLD
Start: 2023-10-28 | End: 2024-01-24

## 2023-10-28 RX ADMIN — FUROSEMIDE 40 MG: 10 INJECTION, SOLUTION INTRAVENOUS at 08:10

## 2023-10-28 RX ADMIN — FOLIC ACID 1 MG: 1 TABLET ORAL at 09:10

## 2023-10-28 RX ADMIN — POLYETHYLENE GLYCOL 3350 17 G: 17 POWDER, FOR SOLUTION ORAL at 09:10

## 2023-10-28 RX ADMIN — PANTOPRAZOLE SODIUM 40 MG: 40 INJECTION, POWDER, FOR SOLUTION INTRAVENOUS at 08:10

## 2023-10-28 RX ADMIN — SODIUM CHLORIDE SOLN NEBU 3% 4 ML: 3 NEBU SOLN at 08:10

## 2023-10-28 RX ADMIN — GLYCOPYRROLATE 1 MG: 1 TABLET ORAL at 09:10

## 2023-10-28 RX ADMIN — ASPIRIN 81 MG CHEWABLE TABLET 81 MG: 81 TABLET CHEWABLE at 09:10

## 2023-10-28 RX ADMIN — METOPROLOL TARTRATE 12.5 MG: 25 TABLET, FILM COATED ORAL at 09:10

## 2023-10-28 RX ADMIN — FLUOXETINE 20 MG: 20 CAPSULE ORAL at 09:10

## 2023-10-28 RX ADMIN — PIPERACILLIN AND TAZOBACTAM 4.5 G: 4; .5 INJECTION, POWDER, LYOPHILIZED, FOR SOLUTION INTRAVENOUS; PARENTERAL at 01:10

## 2023-10-28 NOTE — PLAN OF CARE
Problem: Adult Inpatient Plan of Care  Goal: Plan of Care Review  10/28/2023 0556 by Yanelis Tao, KAMILAH  Outcome: Ongoing, Progressing  10/28/2023 0531 by Yanelis Tao RN  Outcome: Ongoing, Progressing  Goal: Patient-Specific Goal (Individualized)  10/28/2023 0556 by Yanelis Tao, KAMILAH  Outcome: Ongoing, Progressing  10/28/2023 0531 by Yanelis Tao, RN  Outcome: Ongoing, Progressing  Goal: Optimal Comfort and Wellbeing  10/28/2023 0556 by Yanelis Tao RN  Outcome: Ongoing, Progressing  10/28/2023 0531 by Yanelis Tao, RN  Outcome: Ongoing, Progressing  Goal: Readiness for Transition of Care  10/28/2023 0556 by Yanelis Tao RN  Outcome: Ongoing, Progressing  10/28/2023 0531 by Yanelis Tao RN  Outcome: Ongoing, Progressing     Problem: Adjustment to Illness COPD (Chronic Obstructive Pulmonary Disease)  Goal: Optimal Chronic Illness Coping  Outcome: Ongoing, Progressing     Problem: Respiratory Compromise COPD (Chronic Obstructive Pulmonary Disease)  Goal: Effective Oxygenation and Ventilation  10/28/2023 0556 by Yanelis Tao RN  Outcome: Ongoing, Progressing  10/28/2023 0531 by Yanelis Tao RN  Outcome: Ongoing, Progressing     Problem: Coping Ineffective  Goal: Effective Coping  10/28/2023 0556 by Yanelis Tao, KAMILAH  Outcome: Ongoing, Progressing  10/28/2023 0531 by Yanelis Tao RN  Outcome: Ongoing, Progressing     Problem: Skin Injury Risk Increased  Goal: Skin Health and Integrity  Outcome: Ongoing, Progressing

## 2023-10-28 NOTE — DISCHARGE SUMMARY
[facility] Medicine  Discharge Summary      Patient Name: Fareed Richard Jr.  MRN: [id number]  [facility]: [id number]  Patient Class: IP- Inpatient  Admission Date: 10/20/2023  Hospital Length of Stay: 8 days  Discharge Date and Time:  10/28/2023 8:12 AM  Attending Physician: Elma Guerra MD   Discharging Provider: Elma Guerra MD  Primary Care Provider: Kodi Tubbs MD    Hospital Course: Fareed Richard Jr. is a 73 year old male with COPD, chronic diastolic failure (not in acute exacerbation), HTN and SCC of the tongue (s/p glossectomy and flap with tracheostomy on home oxygen of 6L) who was admitted due to concern for acute on chronic hypoxic respiratory failure. He had presented complaining of shortness of breath for 1 week prior that acutely worsened the morning of admission. Patient's SpO2 was 82% in the ED exam room. He was treated initially with steroids, diuresis (furosemide), antibiotics, and aggressive airway care ordered as well. His respiratory culture grew both Pseudomonas and Klebsiella, for which he received a course of piperacillin-tazobactam (x7 day course) and doxycyline (x5 day course) with noted improvement. During his stay he received diuresis and oxygen weaning as tolerated until he was at his baseline volume status and oxygen requirement. There was a noted PEG tube issue on 10/27 and GI attending Dr. Pugh exchanged the PEG without issue. Upon discharge, his wife was notified and confirmed that he had all necessary supplies (trach, PEG) and medications at home.     * No surgery found *      Goals of Care Treatment Preferences:  Code Status: Full Code    Vitals:    10/28/23 0801   BP: 130/70   Pulse: 71   Resp: 16   Temp: 97.7F   Constitutional:  He is not in acute distress. He is frail and ill-appearing (chronic). He is not toxic-appearing.   HEENT: NCAT. Conjunctiva clear. No nasal drainage. Tacky MM. Neck supple. Trach in place with clear secretions.    Pulmonary: Pulmonary effort is normal. No respiratory distress. UAN from trach secretions and minimal rhonchi that clear with coughing with no wheezing or crackles and overall good air movement throughout    Abdominal: soft, NT/ND, PEG in place with dressing c/d/i  Musculoskeletal: normal tone, thin muscle bulk for age  Skin: warm and dry with scattered abrasions in healing stages that are consistent with normal healing lesions from age  Neurological: alert and oriented, difficult to hear but able to nod/yes to questions, cooperative and pleasant this morning  Psychiatric: Mood normal. Behavior normal.     Consults:   Consults (From admission, onward)        Status Ordering Provider     Inpatient consult to Gastroenterology  Once        Provider:  Belinda Meza NP    Completed DAVE WINN     Inpatient consult to Registered Dietitian/Nutritionist  Once        Provider:  (Not yet assigned)    Completed DIONNE VAZ III     Inpatient consult to Palliative Care  Once        Provider:  (Not yet assigned)    Completed DIONNE VAZ III     Inpatient consult to Pulmonology  Once        Provider:  Diego Agarwal MD    Completed DIONNE VAZ III          No new Assessment & Plan notes have been filed under this hospital service since the last note was generated.  Service: Hospital Medicine    Final Active Diagnoses:    Diagnosis Date Noted POA    PRINCIPAL PROBLEM:  Acute hypoxemic respiratory failure [J96.01] 10/20/2023 Yes    PEG tube malfunction [K94.23] 10/27/2023 Yes    Acute on chronic diastolic heart failure [I50.33] 10/20/2023 Yes    Tracheostomy present [Z93.0] 09/23/2022 Not Applicable    PEG (percutaneous endoscopic gastrostomy) status [Z93.1] 09/23/2022 Not Applicable    ACP (advance care planning) [Z71.89] 04/28/2022 Not Applicable    COPD exacerbation [J44.1] 02/16/2022 Yes    Primary hypertension [I10] 12/29/2021 Yes    Coronary artery disease involving native coronary artery  of native heart with unstable angina pectoris [I25.110] 12/29/2021 Yes    Squamous cell cancer of tongue [C02.9] 12/16/2021 Yes      Problems Resolved During this Admission:     Discharged Condition: stable    Disposition: Home or Self Care    Follow Up:   Follow-up Information     Kodi Tubbs MD. Call on 10/30/2023.    Specialty: Internal Medicine  Why: *to set up a follow-up appointment. Thank you!  Contact information:  [address]  [address]  [place] [id number] [phone]                       Patient Instructions:      Ambulatory referral/consult to HOME Palliative Care   Standing Status: Future   Referral Priority: Routine Referral Type: Consultation   Referral Reason: Other   Requested Specialty: Hospice and Palliative Medicine   Number of Visits Requested: 1     Significant Diagnostic Studies: Microbiology:   Sputum Culture   Lab Results   Component Value Date    GSRESP <10 epithelial cells per low power field. 10/20/2023    GSRESP Rare WBC's 10/20/2023    GSRESP Moderate Gram positive rods 10/20/2023    GSRESP Rare budding yeast 10/20/2023    RESPIRATORYC PSEUDOMONAS AERUGINOSA  Many   (A) 10/20/2023    RESPIRATORYC KLEBSIELLA PNEUMONIAE  Moderate   (A) 10/20/2023       Pending Diagnostic Studies:     None         Medications:  Reconciled Home Medications:      Medication List      CHANGE how you take these medications    atorvastatin 40 MG tablet  Commonly known as: LIPITOR  1 tablet (40 mg total) by Per G Tube route every evening.  What changed: when to take this     clopidogreL 75 mg tablet  Commonly known as: PLAVIX  Take 1 tablet (75 mg total) by mouth once daily.  What changed: when to take this     furosemide 40 MG tablet  Commonly known as: LASIX  Take 0.5 tablets (20 mg total) by mouth once daily.  What changed: when to take this     melatonin 3 mg tablet  Commonly known as: MELATIN  1 tablet (3 mg total) by Per G Tube route nightly.  What changed: how much to take     omeprazole 40 MG  capsule  Commonly known as: PRILOSEC  Take 40 mg (contents of one capsule) twice daily through PEG tube. Mix contents of capsule with 10 mL applesauce.  What changed:   · how much to take  · when to take this        CONTINUE taking these medications    albuterol-ipratropium 2.5 mg-0.5 mg/3 mL nebulizer solution  Commonly known as: DUO-NEB  Take 3 mLs by nebulization every 4 (four) hours as needed for Wheezing. Rescue     aspirin 81 MG Chew  Take 1 tablet (81 mg total) by mouth once daily.     bisacodyL 10 mg Supp  Commonly known as: DULCOLAX  Place 1 suppository (10 mg total) rectally daily as needed.     CUVPOSA 1 mg/5 mL (0.2 mg/mL) Soln  Generic drug: glycopyrrolate  Take 5 mLs (1 mg total) by mouth 3 (three) times daily as needed (TO REDUCE SECRETIONS).     FLUoxetine 20 mg/5 mL (4 mg/mL) solution  Commonly known as: PROZAC  Take 20 mg by mouth once daily.     folic acid 1 MG tablet  Commonly known as: FOLVITE  Take 1 mg by mouth once daily.     hydrOXYzine HCL 25 MG tablet  Commonly known as: ATARAX  SMARTSI.5 Tablet(s) Gastro Tube Every 8 Hours PRN     metoprolol tartrate 25 MG tablet  Commonly known as: LOPRESSOR  Take 0.5 tablets (12.5 mg total) by mouth 2 (two) times daily.     polyethylene glycol 17 gram Pwpk  Commonly known as: GLYCOLAX  17 g by Per G Tube route 2 (two) times daily.     sodium chloride 3% 3 % nebulizer solution  Take 4 mLs by nebulization 2 (two) times a day.     WIXELA INHUB 250-50 mcg/dose diskus inhaler  Generic drug: fluticasone-salmeterol 250-50 mcg/dose  SMARTSI Puff(s) By Mouth Every 12 Hours        ASK your doctor about these medications    ascorbic acid (vitamin C) 100 MG tablet  Commonly known as: VITAMIN C  Take 100 mg by mouth once daily.     ferrous sulfate 325 (65 FE) MG EC tablet  Take 325 mg by mouth 3 (three) times daily with meals.     thiamine 50 MG tablet  Commonly known as: VITAMIN B-1  Take 50 mg by mouth once daily.          Indwelling Lines/Drains at time of  discharge:   Lines/Drains/Airways     Drain  Duration                Gastrostomy/Enterostomy 01/04/22 Percutaneous endoscopic gastrostomy (PEG)  days    Male External Urinary Catheter 10/20/23 2100 7 days          Airway  Duration           Adult Surgical Airway 03/16/23 1014 Shiley Cuffed 6.0 225 days              Time spent on the discharge of patient: 30+ minutes    Elma Guerra MD  Department of Hospital Medicine  [facility]

## 2023-10-28 NOTE — NURSING
Cleansed then reviewed discharge summary. Large liquid stool noted. Applied oxygen as ordered prior to discharge Transported via wheelchair to main entrance with wife at side

## 2023-10-28 NOTE — NURSING
[facility]  Nurses Note -- 4 Eyes    10-27-23      Skin assessed on: Q Shift      [x] No Pressure Injuries Present    [x]Prevention Measures Documented    [] Yes LDA  for Pressure Injury Previously documented     [] Yes New Pressure Injury Discovered   [] LDA for New Pressure Injury Added      Attending RN:  Yanelis Tao RN     Second RN:  Ysabel Larson RN

## 2023-10-28 NOTE — PLAN OF CARE
Problem: Adult Inpatient Plan of Care  Goal: Plan of Care Review  Outcome: Ongoing, Progressing  Goal: Patient-Specific Goal (Individualized)  Outcome: Ongoing, Progressing  Goal: Optimal Comfort and Wellbeing  Outcome: Ongoing, Progressing  Goal: Readiness for Transition of Care  Outcome: Ongoing, Progressing     Problem: Adjustment to Illness COPD (Chronic Obstructive Pulmonary Disease)  Goal: Optimal Chronic Illness Coping  Outcome: Ongoing, Progressing     Problem: Respiratory Compromise COPD (Chronic Obstructive Pulmonary Disease)  Goal: Effective Oxygenation and Ventilation  Outcome: Ongoing, Progressing     Problem: Coping Ineffective  Goal: Effective Coping  Outcome: Ongoing, Progressing

## 2023-10-28 NOTE — PLAN OF CARE
Problem: Adult Inpatient Plan of Care  Goal: Plan of Care Review  Outcome: Ongoing, Progressing  Flowsheets (Taken 10/28/2023 0815)  Plan of Care Reviewed With: patient  Goal: Patient-Specific Goal (Individualized)  Outcome: Ongoing, Progressing     Problem: Adult Inpatient Plan of Care  Goal: Plan of Care Review  Outcome: Ongoing, Progressing  Flowsheets (Taken 10/28/2023 0815)  Plan of Care Reviewed With: patient     Problem: Adult Inpatient Plan of Care  Goal: Plan of Care Review  Outcome: Ongoing, Progressing  Flowsheets (Taken 10/28/2023 0815)  Plan of Care Reviewed With: patient     Problem: Adult Inpatient Plan of Care  Goal: Patient-Specific Goal (Individualized)  Outcome: Ongoing, Progressing     Problem: Adult Inpatient Plan of Care  Goal: Patient-Specific Goal (Individualized)  Outcome: Ongoing, Progressing

## 2023-10-28 NOTE — PLAN OF CARE
10/28/23 1006   Final Note   Assessment Type Final Discharge Note   Anticipated Discharge Disposition Home   What phone number can be called within the next 1-3 days to see how you are doing after discharge? [phone]   Hospital Resources/Appts/Education Provided Provided patient/caregiver with written discharge plan information   Post-Acute Status   Discharge Delays None known at this time     Patient to go home with spouse at discharge. Nurse Beata Aviles informed that patient is clear for discharge from case management standpoint.

## 2023-10-31 NOTE — PROCEDURES
[facility] Note    Bedside PEG exchange performed 10/27/23    The patient's damaged externally removal PEG tube was removed from his abdomen.  The area was cleaned with chlorhexadine wipes.  A new 20Fr.  Externally removable PEG was placed through his previous PEG site.  The balloon was inflated with 6mL of water.  KUB performed which confirmed the PEG was located in the stomach.    Belia Pugh MD

## 2023-11-17 ENCOUNTER — TELEPHONE (OUTPATIENT)
Dept: CARDIOLOGY | Facility: CLINIC | Age: 73
End: 2023-11-17
Payer: MEDICARE

## 2023-11-17 NOTE — TELEPHONE ENCOUNTER
Patient's wife was contacted.  Patient's wife had email another MA the information needed to be filled.  She was advised to fax it over to the clinic so we can look at it and have it filled out.  Fax number was given, [phone]. Patient showed good verbal understanding.

## 2023-11-17 NOTE — TELEPHONE ENCOUNTER
----- Message from Marcia Ramirez sent at 11/17/2023  3:40 PM CST -----  Regarding: self [phone]  Type: Patient Call Back    Who called: Wife Cat  (Bridgett Richard)    What is the request in detail: asking if paper work was received by Brooklynn and if it was going to cost anything to fill  it out. She said she emailed it to her.    Can the clinic reply by FAISALSABDOULAYE? no    Would the patient rather a call back or a response via [facility]? Call back     Best call back number: [phone]

## 2023-11-20 ENCOUNTER — PATIENT MESSAGE (OUTPATIENT)
Dept: CARDIOLOGY | Facility: CLINIC | Age: 73
End: 2023-11-20
Payer: MEDICARE

## 2023-11-20 ENCOUNTER — HOSPITAL ENCOUNTER (INPATIENT)
Facility: HOSPITAL | Age: 73
LOS: 11 days | Discharge: HOME HEALTH | DRG: 177 | End: 2023-12-01
Attending: EMERGENCY MEDICINE | Admitting: STUDENT IN AN ORGANIZED HEALTH CARE EDUCATION/TRAINING PROGRAM
Payer: MEDICARE

## 2023-11-20 DIAGNOSIS — R05.9 COUGH: ICD-10-CM

## 2023-11-20 DIAGNOSIS — R06.02 SOB (SHORTNESS OF BREATH): ICD-10-CM

## 2023-11-20 PROBLEM — I50.32 CHRONIC DIASTOLIC HEART FAILURE: Status: ACTIVE | Noted: 2023-10-20

## 2023-11-20 PROBLEM — J18.9 PNEUMONIA: Status: RESOLVED | Noted: 2022-09-23 | Resolved: 2023-11-20

## 2023-11-20 LAB
ALBUMIN SERPL BCP-MCNC: 3.1 G/DL (ref 3.5–5.2)
ALLENS TEST: ABNORMAL
ALP SERPL-CCNC: 115 U/L (ref 55–135)
ALT SERPL W/O P-5'-P-CCNC: 17 U/L (ref 10–44)
ANION GAP SERPL CALC-SCNC: 10 MMOL/L (ref 8–16)
AST SERPL-CCNC: 17 U/L (ref 10–40)
BASOPHILS # BLD AUTO: 0.02 K/UL (ref 0–0.2)
BASOPHILS NFR BLD: 0.3 % (ref 0–1.9)
BILIRUB SERPL-MCNC: 0.2 MG/DL (ref 0.1–1)
BNP SERPL-MCNC: 1708 PG/ML (ref 0–99)
BUN SERPL-MCNC: 32 MG/DL (ref 8–23)
CALCIUM SERPL-MCNC: 9.5 MG/DL (ref 8.7–10.5)
CHLORIDE SERPL-SCNC: 94 MMOL/L (ref 95–110)
CO2 SERPL-SCNC: 34 MMOL/L (ref 23–29)
CREAT SERPL-MCNC: 0.8 MG/DL (ref 0.5–1.4)
CTP QC/QA: YES
CTP QC/QA: YES
DELSYS: ABNORMAL
DIFFERENTIAL METHOD BLD: ABNORMAL
EOSINOPHIL # BLD AUTO: 0.1 K/UL (ref 0–0.5)
EOSINOPHIL NFR BLD: 1.7 % (ref 0–8)
ERYTHROCYTE [DISTWIDTH] IN BLOOD BY AUTOMATED COUNT: 13.9 % (ref 11.5–14.5)
EST. GFR  (NO RACE VARIABLE): >60 ML/MIN/1.73 M^2
FLOW: 6
GLUCOSE SERPL-MCNC: 181 MG/DL (ref 70–110)
HCO3 UR-SCNC: 35.8 MMOL/L (ref 24–28)
HCT VFR BLD AUTO: 34.3 % (ref 40–54)
HGB BLD-MCNC: 10.4 G/DL (ref 14–18)
IMM GRANULOCYTES # BLD AUTO: 0.02 K/UL (ref 0–0.04)
IMM GRANULOCYTES NFR BLD AUTO: 0.3 % (ref 0–0.5)
LACTATE SERPL-SCNC: 2.9 MMOL/L (ref 0.5–2.2)
LYMPHOCYTES # BLD AUTO: 0.7 K/UL (ref 1–4.8)
LYMPHOCYTES NFR BLD: 8.9 % (ref 18–48)
MCH RBC QN AUTO: 28 PG (ref 27–31)
MCHC RBC AUTO-ENTMCNC: 30.3 G/DL (ref 32–36)
MCV RBC AUTO: 93 FL (ref 82–98)
MODE: ABNORMAL
MONOCYTES # BLD AUTO: 0.9 K/UL (ref 0.3–1)
MONOCYTES NFR BLD: 11.1 % (ref 4–15)
NEUTROPHILS # BLD AUTO: 6 K/UL (ref 1.8–7.7)
NEUTROPHILS NFR BLD: 77.7 % (ref 38–73)
NRBC BLD-RTO: 0 /100 WBC
PCO2 BLDA: 60.3 MMHG (ref 35–45)
PH SMN: 7.38 [PH] (ref 7.35–7.45)
PLATELET # BLD AUTO: 226 K/UL (ref 150–450)
PMV BLD AUTO: 9.5 FL (ref 9.2–12.9)
PO2 BLDA: 26 MMHG (ref 40–60)
POC BE: 9 MMOL/L
POC MOLECULAR INFLUENZA A AGN: NEGATIVE
POC MOLECULAR INFLUENZA B AGN: NEGATIVE
POC SATURATED O2: 44 % (ref 95–100)
POC TCO2: 38 MMOL/L (ref 24–29)
POTASSIUM SERPL-SCNC: 4.3 MMOL/L (ref 3.5–5.1)
PROT SERPL-MCNC: 7.7 G/DL (ref 6–8.4)
RBC # BLD AUTO: 3.71 M/UL (ref 4.6–6.2)
SAMPLE: ABNORMAL
SARS-COV-2 RDRP RESP QL NAA+PROBE: NEGATIVE
SITE: ABNORMAL
SODIUM SERPL-SCNC: 138 MMOL/L (ref 136–145)
SP02: 88
TROPONIN I SERPL DL<=0.01 NG/ML-MCNC: 0.03 NG/ML (ref 0–0.03)
WBC # BLD AUTO: 7.66 K/UL (ref 3.9–12.7)

## 2023-11-20 PROCEDURE — 96365 THER/PROPH/DIAG IV INF INIT: CPT

## 2023-11-20 PROCEDURE — 27000221 HC OXYGEN, UP TO 24 HOURS

## 2023-11-20 PROCEDURE — 25000003 PHARM REV CODE 250: Performed by: EMERGENCY MEDICINE

## 2023-11-20 PROCEDURE — 85025 COMPLETE CBC W/AUTO DIFF WBC: CPT | Performed by: EMERGENCY MEDICINE

## 2023-11-20 PROCEDURE — 83605 ASSAY OF LACTIC ACID: CPT | Performed by: EMERGENCY MEDICINE

## 2023-11-20 PROCEDURE — 93005 ELECTROCARDIOGRAM TRACING: CPT

## 2023-11-20 PROCEDURE — 5A1935Z RESPIRATORY VENTILATION, LESS THAN 24 CONSECUTIVE HOURS: ICD-10-PCS | Performed by: EMERGENCY MEDICINE

## 2023-11-20 PROCEDURE — 99900035 HC TECH TIME PER 15 MIN (STAT)

## 2023-11-20 PROCEDURE — 83880 ASSAY OF NATRIURETIC PEPTIDE: CPT | Performed by: EMERGENCY MEDICINE

## 2023-11-20 PROCEDURE — 94640 AIRWAY INHALATION TREATMENT: CPT

## 2023-11-20 PROCEDURE — 63600175 PHARM REV CODE 636 W HCPCS: Performed by: EMERGENCY MEDICINE

## 2023-11-20 PROCEDURE — 94799 UNLISTED PULMONARY SVC/PX: CPT

## 2023-11-20 PROCEDURE — 93010 ELECTROCARDIOGRAM REPORT: CPT | Mod: ,,, | Performed by: INTERNAL MEDICINE

## 2023-11-20 PROCEDURE — 87502 INFLUENZA DNA AMP PROBE: CPT

## 2023-11-20 PROCEDURE — 87635 SARS-COV-2 COVID-19 AMP PRB: CPT | Performed by: EMERGENCY MEDICINE

## 2023-11-20 PROCEDURE — 96374 THER/PROPH/DIAG INJ IV PUSH: CPT | Mod: 59

## 2023-11-20 PROCEDURE — 84484 ASSAY OF TROPONIN QUANT: CPT | Performed by: EMERGENCY MEDICINE

## 2023-11-20 PROCEDURE — 27000190 HC CPAP FULL FACE MASK W/VALVE

## 2023-11-20 PROCEDURE — 94761 N-INVAS EAR/PLS OXIMETRY MLT: CPT

## 2023-11-20 PROCEDURE — 93010 EKG 12-LEAD: ICD-10-PCS | Mod: ,,, | Performed by: INTERNAL MEDICINE

## 2023-11-20 PROCEDURE — 99900026 HC AIRWAY MAINTENANCE (STAT)

## 2023-11-20 PROCEDURE — 25000242 PHARM REV CODE 250 ALT 637 W/ HCPCS: Performed by: STUDENT IN AN ORGANIZED HEALTH CARE EDUCATION/TRAINING PROGRAM

## 2023-11-20 PROCEDURE — 25000003 PHARM REV CODE 250: Performed by: STUDENT IN AN ORGANIZED HEALTH CARE EDUCATION/TRAINING PROGRAM

## 2023-11-20 PROCEDURE — 82800 BLOOD PH: CPT

## 2023-11-20 PROCEDURE — 80053 COMPREHEN METABOLIC PANEL: CPT | Performed by: EMERGENCY MEDICINE

## 2023-11-20 PROCEDURE — 94660 CPAP INITIATION&MGMT: CPT

## 2023-11-20 PROCEDURE — 12000002 HC ACUTE/MED SURGE SEMI-PRIVATE ROOM

## 2023-11-20 PROCEDURE — 99291 CRITICAL CARE FIRST HOUR: CPT

## 2023-11-20 PROCEDURE — 87040 BLOOD CULTURE FOR BACTERIA: CPT | Mod: 59 | Performed by: EMERGENCY MEDICINE

## 2023-11-20 RX ORDER — MAGNESIUM SULFATE HEPTAHYDRATE 40 MG/ML
4 INJECTION, SOLUTION INTRAVENOUS
Status: DISCONTINUED | OUTPATIENT
Start: 2023-11-20 | End: 2023-12-01 | Stop reason: HOSPADM

## 2023-11-20 RX ORDER — NAPROXEN SODIUM 220 MG/1
81 TABLET, FILM COATED ORAL DAILY
Status: DISCONTINUED | OUTPATIENT
Start: 2023-11-21 | End: 2023-12-01 | Stop reason: HOSPADM

## 2023-11-20 RX ORDER — HYDROXYZINE HYDROCHLORIDE 25 MG/1
25 TABLET, FILM COATED ORAL 3 TIMES DAILY PRN
Status: DISCONTINUED | OUTPATIENT
Start: 2023-11-20 | End: 2023-12-01 | Stop reason: HOSPADM

## 2023-11-20 RX ORDER — TALC
6 POWDER (GRAM) TOPICAL NIGHTLY PRN
Status: DISCONTINUED | OUTPATIENT
Start: 2023-11-20 | End: 2023-12-01 | Stop reason: HOSPADM

## 2023-11-20 RX ORDER — HEPARIN SODIUM 5000 [USP'U]/ML
5000 INJECTION, SOLUTION INTRAVENOUS; SUBCUTANEOUS EVERY 8 HOURS
Status: DISCONTINUED | OUTPATIENT
Start: 2023-11-20 | End: 2023-11-26

## 2023-11-20 RX ORDER — CLOPIDOGREL BISULFATE 75 MG/1
75 TABLET ORAL NIGHTLY
Status: DISCONTINUED | OUTPATIENT
Start: 2023-11-20 | End: 2023-12-01 | Stop reason: HOSPADM

## 2023-11-20 RX ORDER — POTASSIUM CHLORIDE 7.45 MG/ML
40 INJECTION INTRAVENOUS
Status: DISCONTINUED | OUTPATIENT
Start: 2023-11-20 | End: 2023-12-01 | Stop reason: HOSPADM

## 2023-11-20 RX ORDER — FAMOTIDINE 10 MG/ML
20 INJECTION INTRAVENOUS DAILY
Status: DISCONTINUED | OUTPATIENT
Start: 2023-11-21 | End: 2023-12-01 | Stop reason: HOSPADM

## 2023-11-20 RX ORDER — PROCHLORPERAZINE EDISYLATE 5 MG/ML
5 INJECTION INTRAMUSCULAR; INTRAVENOUS EVERY 6 HOURS PRN
Status: DISCONTINUED | OUTPATIENT
Start: 2023-11-20 | End: 2023-12-01 | Stop reason: HOSPADM

## 2023-11-20 RX ORDER — CALCIUM GLUCONATE 20 MG/ML
2 INJECTION, SOLUTION INTRAVENOUS
Status: DISCONTINUED | OUTPATIENT
Start: 2023-11-20 | End: 2023-12-01 | Stop reason: HOSPADM

## 2023-11-20 RX ORDER — MAGNESIUM SULFATE HEPTAHYDRATE 40 MG/ML
2 INJECTION, SOLUTION INTRAVENOUS
Status: DISCONTINUED | OUTPATIENT
Start: 2023-11-20 | End: 2023-12-01 | Stop reason: HOSPADM

## 2023-11-20 RX ORDER — POTASSIUM CHLORIDE 7.45 MG/ML
80 INJECTION INTRAVENOUS
Status: DISCONTINUED | OUTPATIENT
Start: 2023-11-20 | End: 2023-12-01 | Stop reason: HOSPADM

## 2023-11-20 RX ORDER — BUDESONIDE 0.5 MG/2ML
0.5 INHALANT ORAL EVERY 12 HOURS
Status: DISCONTINUED | OUTPATIENT
Start: 2023-11-21 | End: 2023-12-01 | Stop reason: HOSPADM

## 2023-11-20 RX ORDER — ATORVASTATIN CALCIUM 40 MG/1
40 TABLET, FILM COATED ORAL DAILY
Status: DISCONTINUED | OUTPATIENT
Start: 2023-11-21 | End: 2023-12-01 | Stop reason: HOSPADM

## 2023-11-20 RX ORDER — CALCIUM GLUCONATE 20 MG/ML
3 INJECTION, SOLUTION INTRAVENOUS
Status: DISCONTINUED | OUTPATIENT
Start: 2023-11-20 | End: 2023-12-01 | Stop reason: HOSPADM

## 2023-11-20 RX ORDER — SODIUM CHLORIDE FOR INHALATION 3 %
4 VIAL, NEBULIZER (ML) INHALATION 2 TIMES DAILY
Status: DISCONTINUED | OUTPATIENT
Start: 2023-11-20 | End: 2023-11-21

## 2023-11-20 RX ORDER — FLUOXETINE 20 MG/5ML
20 SOLUTION ORAL DAILY
Status: DISCONTINUED | OUTPATIENT
Start: 2023-11-21 | End: 2023-12-01 | Stop reason: HOSPADM

## 2023-11-20 RX ORDER — ONDANSETRON HYDROCHLORIDE 2 MG/ML
4 INJECTION, SOLUTION INTRAVENOUS EVERY 8 HOURS PRN
Status: DISCONTINUED | OUTPATIENT
Start: 2023-11-20 | End: 2023-12-01 | Stop reason: HOSPADM

## 2023-11-20 RX ORDER — SODIUM CHLORIDE 0.9 % (FLUSH) 0.9 %
10 SYRINGE (ML) INJECTION
Status: DISCONTINUED | OUTPATIENT
Start: 2023-11-20 | End: 2023-12-01 | Stop reason: HOSPADM

## 2023-11-20 RX ORDER — THIAMINE HCL 50 MG
50 TABLET ORAL DAILY
Status: DISCONTINUED | OUTPATIENT
Start: 2023-11-21 | End: 2023-12-01 | Stop reason: HOSPADM

## 2023-11-20 RX ORDER — ACETAMINOPHEN 325 MG/1
650 TABLET ORAL EVERY 4 HOURS PRN
Status: DISCONTINUED | OUTPATIENT
Start: 2023-11-20 | End: 2023-12-01 | Stop reason: HOSPADM

## 2023-11-20 RX ORDER — ARFORMOTEROL TARTRATE 15 UG/2ML
15 SOLUTION RESPIRATORY (INHALATION) 2 TIMES DAILY
Status: DISCONTINUED | OUTPATIENT
Start: 2023-11-21 | End: 2023-12-01 | Stop reason: HOSPADM

## 2023-11-20 RX ORDER — METHYLPREDNISOLONE SOD SUCC 125 MG
80 VIAL (EA) INJECTION
Status: COMPLETED | OUTPATIENT
Start: 2023-11-20 | End: 2023-11-20

## 2023-11-20 RX ORDER — FUROSEMIDE 10 MG/ML
40 INJECTION INTRAMUSCULAR; INTRAVENOUS
Status: COMPLETED | OUTPATIENT
Start: 2023-11-20 | End: 2023-11-20

## 2023-11-20 RX ORDER — GLYCOPYRROLATE 1 MG/5ML
1 SOLUTION ORAL 3 TIMES DAILY PRN
Status: DISCONTINUED | OUTPATIENT
Start: 2023-11-20 | End: 2023-11-20

## 2023-11-20 RX ORDER — CALCIUM GLUCONATE 20 MG/ML
1 INJECTION, SOLUTION INTRAVENOUS
Status: DISCONTINUED | OUTPATIENT
Start: 2023-11-20 | End: 2023-12-01 | Stop reason: HOSPADM

## 2023-11-20 RX ORDER — FUROSEMIDE 20 MG/1
20 TABLET ORAL NIGHTLY
Status: DISCONTINUED | OUTPATIENT
Start: 2023-11-20 | End: 2023-11-21

## 2023-11-20 RX ORDER — POTASSIUM CHLORIDE 7.45 MG/ML
60 INJECTION INTRAVENOUS
Status: DISCONTINUED | OUTPATIENT
Start: 2023-11-20 | End: 2023-12-01 | Stop reason: HOSPADM

## 2023-11-20 RX ORDER — METOPROLOL TARTRATE 25 MG/1
12.5 TABLET ORAL 2 TIMES DAILY
Status: DISCONTINUED | OUTPATIENT
Start: 2023-11-20 | End: 2023-12-01 | Stop reason: HOSPADM

## 2023-11-20 RX ORDER — FOLIC ACID 1 MG/1
1 TABLET ORAL DAILY
Status: DISCONTINUED | OUTPATIENT
Start: 2023-11-21 | End: 2023-12-01 | Stop reason: HOSPADM

## 2023-11-20 RX ORDER — IPRATROPIUM BROMIDE AND ALBUTEROL SULFATE 2.5; .5 MG/3ML; MG/3ML
3 SOLUTION RESPIRATORY (INHALATION) EVERY 4 HOURS PRN
Status: DISCONTINUED | OUTPATIENT
Start: 2023-11-20 | End: 2023-12-01 | Stop reason: HOSPADM

## 2023-11-20 RX ORDER — IPRATROPIUM BROMIDE AND ALBUTEROL SULFATE 2.5; .5 MG/3ML; MG/3ML
3 SOLUTION RESPIRATORY (INHALATION) EVERY 6 HOURS
Status: DISCONTINUED | OUTPATIENT
Start: 2023-11-20 | End: 2023-12-01 | Stop reason: HOSPADM

## 2023-11-20 RX ORDER — FLUTICASONE FUROATE AND VILANTEROL 100; 25 UG/1; UG/1
1 POWDER RESPIRATORY (INHALATION) DAILY
Status: DISCONTINUED | OUTPATIENT
Start: 2023-11-21 | End: 2023-11-20

## 2023-11-20 RX ADMIN — PIPERACILLIN AND TAZOBACTAM 4.5 G: 4; .5 INJECTION, POWDER, LYOPHILIZED, FOR SOLUTION INTRAVENOUS; PARENTERAL at 05:11

## 2023-11-20 RX ADMIN — FUROSEMIDE 40 MG: 10 INJECTION, SOLUTION INTRAVENOUS at 06:11

## 2023-11-20 RX ADMIN — SODIUM CHLORIDE SOLN NEBU 3% 4 ML: 3 NEBU SOLN at 08:11

## 2023-11-20 RX ADMIN — VANCOMYCIN HYDROCHLORIDE 1250 MG: 1.25 INJECTION, POWDER, LYOPHILIZED, FOR SOLUTION INTRAVENOUS at 06:11

## 2023-11-20 RX ADMIN — FUROSEMIDE 20 MG: 20 TABLET ORAL at 08:11

## 2023-11-20 RX ADMIN — CLOPIDOGREL BISULFATE 75 MG: 75 TABLET ORAL at 08:11

## 2023-11-20 RX ADMIN — METOPROLOL TARTRATE 12.5 MG: 25 TABLET, FILM COATED ORAL at 08:11

## 2023-11-20 RX ADMIN — IPRATROPIUM BROMIDE AND ALBUTEROL SULFATE 3 ML: 2.5; .5 SOLUTION RESPIRATORY (INHALATION) at 08:11

## 2023-11-20 RX ADMIN — METHYLPREDNISOLONE SODIUM SUCCINATE 80 MG: 125 INJECTION, POWDER, FOR SOLUTION INTRAMUSCULAR; INTRAVENOUS at 04:11

## 2023-11-20 NOTE — CARE UPDATE
Latest Reference Range & Units 11/20/23 16:39   POC PH 7.35 - 7.45  7.382   POC PCO2 35 - 45 mmHg 60.3 (H)   POC PO2 40 - 60 mmHg 26 (LL)   POC HCO3 24 - 28 mmol/L 35.8 (H)   POC SATURATED O2 95 - 100 % 44   Sample  VENOUS   POC TCO2 24 - 29 mmol/L 38 (H)   POC BE -2 to 2 mmol/L 9 (H)   Flow  6   [facility]  T-[facility]   Site  Other   Mode  SPONT   (LL): Data is critically low  (H): Data is abnormally high

## 2023-11-20 NOTE — ADMISSIONCARE
AdmissionCare    Guideline: COPD - INPT, Inpatient    Based on the indications selected for the patient, the bed status of Admit to Inpatient was determined to be MET    The following indications were selected as present at the time of evaluation of the patient:      High-risk active acute comorbidity (eg, dysrhythmia, heart failure, pleural effusion, pneumothorax, myopathy, rib fracture) with new or worsened (eg, from baseline) signs or symptoms of COPD (eg, dyspnea, Tachypnea)   -     AdmissionCare documentation entered by: Bernie Avila    [facility] [employer], 27th edition, Copyright © 2023 [facility] [employer] [facility] All Rights Reserved.  [id number]:00

## 2023-11-20 NOTE — ED TRIAGE NOTES
Fareed Richard ., a 73 y.o. male presents to the ED via EMS w/ complaint of SOB. EMS reports pt was 90% on 4 LPM trach mask (which is regular home use). EMS reports suctioning pt and giving a duo neb tx after noting wheezing. Pt currently 99% on 5L via trach mask. Pt denies any chest pain/discomfort, n/v/d, or any other s/s. Pt is AAOX4.     Triage note:  Chief Complaint   Patient presents with    Shortness of Breath     Pt BIB EMS from home for SOB. EMS reported 90% SPO2 on 4 LPM trach mask. EMS reported suctioning Pt and O2 saturation decreased. EMS reported wheezing and gave a duo nebulizer treatment.     Review of patient's allergies indicates:   Allergen Reactions    Ativan [lorazepam] Anxiety     Past Medical History:   Diagnosis Date    Cancer     skin to Rt forearm and face    COPD (chronic obstructive pulmonary disease)     Hyperlipidemia     Hypertension     Pseudoaneurysm

## 2023-11-21 PROBLEM — R04.2 BLOODY SPUTUM: Status: RESOLVED | Noted: 2022-09-14 | Resolved: 2023-11-21

## 2023-11-21 PROBLEM — K94.23 PEG TUBE MALFUNCTION: Status: RESOLVED | Noted: 2023-10-27 | Resolved: 2023-11-21

## 2023-11-21 PROBLEM — J96.21 ACUTE ON CHRONIC RESPIRATORY FAILURE WITH HYPOXIA: Status: ACTIVE | Noted: 2023-10-20

## 2023-11-21 PROBLEM — J69.0 ASPIRATION PNEUMONIA: Status: RESOLVED | Noted: 2022-04-22 | Resolved: 2023-11-21

## 2023-11-21 PROBLEM — J40 TRACHEOBRONCHITIS: Status: RESOLVED | Noted: 2022-01-18 | Resolved: 2023-11-21

## 2023-11-21 PROBLEM — T65.222A: Status: RESOLVED | Noted: 2022-02-16 | Resolved: 2023-11-21

## 2023-11-21 PROBLEM — J90 PLEURAL EFFUSION: Status: RESOLVED | Noted: 2023-04-18 | Resolved: 2023-11-21

## 2023-11-21 PROBLEM — D62 ACUTE BLOOD LOSS ANEMIA: Status: RESOLVED | Noted: 2022-02-16 | Resolved: 2023-11-21

## 2023-11-21 PROBLEM — R04.2 HEMOPTYSIS: Status: RESOLVED | Noted: 2023-03-19 | Resolved: 2023-11-21

## 2023-11-21 PROBLEM — D64.9 NORMOCYTIC ANEMIA: Status: ACTIVE | Noted: 2023-11-21

## 2023-11-21 PROBLEM — J04.10 ACUTE TRACHEITIS: Status: RESOLVED | Noted: 2022-09-23 | Resolved: 2023-11-21

## 2023-11-21 PROBLEM — R91.8 PULMONARY INFILTRATE: Status: RESOLVED | Noted: 2022-08-24 | Resolved: 2023-11-21

## 2023-11-21 LAB
POCT GLUCOSE: 147 MG/DL (ref 70–110)
POCT GLUCOSE: 155 MG/DL (ref 70–110)
POCT GLUCOSE: 160 MG/DL (ref 70–110)
VANCOMYCIN TROUGH SERPL-MCNC: 11.1 UG/ML (ref 10–22)

## 2023-11-21 PROCEDURE — 25000003 PHARM REV CODE 250: Performed by: STUDENT IN AN ORGANIZED HEALTH CARE EDUCATION/TRAINING PROGRAM

## 2023-11-21 PROCEDURE — 25000242 PHARM REV CODE 250 ALT 637 W/ HCPCS: Performed by: NURSE PRACTITIONER

## 2023-11-21 PROCEDURE — 94660 CPAP INITIATION&MGMT: CPT

## 2023-11-21 PROCEDURE — 20000000 HC ICU ROOM

## 2023-11-21 PROCEDURE — 99900035 HC TECH TIME PER 15 MIN (STAT)

## 2023-11-21 PROCEDURE — 25000242 PHARM REV CODE 250 ALT 637 W/ HCPCS: Performed by: STUDENT IN AN ORGANIZED HEALTH CARE EDUCATION/TRAINING PROGRAM

## 2023-11-21 PROCEDURE — 25000003 PHARM REV CODE 250: Performed by: HOSPITALIST

## 2023-11-21 PROCEDURE — 27000221 HC OXYGEN, UP TO 24 HOURS

## 2023-11-21 PROCEDURE — 87070 CULTURE OTHR SPECIMN AEROBIC: CPT | Performed by: STUDENT IN AN ORGANIZED HEALTH CARE EDUCATION/TRAINING PROGRAM

## 2023-11-21 PROCEDURE — 94640 AIRWAY INHALATION TREATMENT: CPT

## 2023-11-21 PROCEDURE — 99291 PR CRITICAL CARE, E/M 30-74 MINUTES: ICD-10-PCS | Mod: ,,, | Performed by: NURSE PRACTITIONER

## 2023-11-21 PROCEDURE — 36415 COLL VENOUS BLD VENIPUNCTURE: CPT | Performed by: HOSPITALIST

## 2023-11-21 PROCEDURE — 87077 CULTURE AEROBIC IDENTIFY: CPT | Performed by: STUDENT IN AN ORGANIZED HEALTH CARE EDUCATION/TRAINING PROGRAM

## 2023-11-21 PROCEDURE — 94799 UNLISTED PULMONARY SVC/PX: CPT

## 2023-11-21 PROCEDURE — 63600175 PHARM REV CODE 636 W HCPCS: Performed by: NURSE PRACTITIONER

## 2023-11-21 PROCEDURE — 63600175 PHARM REV CODE 636 W HCPCS: Performed by: HOSPITALIST

## 2023-11-21 PROCEDURE — 99900026 HC AIRWAY MAINTENANCE (STAT)

## 2023-11-21 PROCEDURE — 80202 ASSAY OF VANCOMYCIN: CPT | Performed by: HOSPITALIST

## 2023-11-21 PROCEDURE — 87205 SMEAR GRAM STAIN: CPT | Performed by: STUDENT IN AN ORGANIZED HEALTH CARE EDUCATION/TRAINING PROGRAM

## 2023-11-21 PROCEDURE — 87186 SC STD MICRODIL/AGAR DIL: CPT | Performed by: STUDENT IN AN ORGANIZED HEALTH CARE EDUCATION/TRAINING PROGRAM

## 2023-11-21 PROCEDURE — 99291 CRITICAL CARE FIRST HOUR: CPT | Mod: ,,, | Performed by: NURSE PRACTITIONER

## 2023-11-21 PROCEDURE — 63600175 PHARM REV CODE 636 W HCPCS: Performed by: STUDENT IN AN ORGANIZED HEALTH CARE EDUCATION/TRAINING PROGRAM

## 2023-11-21 PROCEDURE — 94761 N-INVAS EAR/PLS OXIMETRY MLT: CPT

## 2023-11-21 RX ORDER — SODIUM CHLORIDE FOR INHALATION 3 %
4 VIAL, NEBULIZER (ML) INHALATION
Status: DISCONTINUED | OUTPATIENT
Start: 2023-11-21 | End: 2023-12-01 | Stop reason: HOSPADM

## 2023-11-21 RX ORDER — FUROSEMIDE 10 MG/ML
40 INJECTION INTRAMUSCULAR; INTRAVENOUS ONCE
Status: COMPLETED | OUTPATIENT
Start: 2023-11-21 | End: 2023-11-21

## 2023-11-21 RX ORDER — MUPIROCIN 20 MG/G
OINTMENT TOPICAL 2 TIMES DAILY
Status: COMPLETED | OUTPATIENT
Start: 2023-11-21 | End: 2023-11-25

## 2023-11-21 RX ORDER — INSULIN ASPART 100 [IU]/ML
0-5 INJECTION, SOLUTION INTRAVENOUS; SUBCUTANEOUS EVERY 6 HOURS PRN
Status: DISCONTINUED | OUTPATIENT
Start: 2023-11-21 | End: 2023-12-01 | Stop reason: HOSPADM

## 2023-11-21 RX ORDER — GLUCAGON 1 MG
1 KIT INJECTION
Status: DISCONTINUED | OUTPATIENT
Start: 2023-11-21 | End: 2023-12-01 | Stop reason: HOSPADM

## 2023-11-21 RX ORDER — LIDOCAINE HYDROCHLORIDE 10 MG/ML
1 INJECTION, SOLUTION EPIDURAL; INFILTRATION; INTRACAUDAL; PERINEURAL ONCE
Status: COMPLETED | OUTPATIENT
Start: 2023-11-21 | End: 2023-11-21

## 2023-11-21 RX ADMIN — SODIUM CHLORIDE SOLN NEBU 3% 4 ML: 3 NEBU SOLN at 03:11

## 2023-11-21 RX ADMIN — LIDOCAINE HYDROCHLORIDE 10 MG: 10 INJECTION, SOLUTION EPIDURAL; INFILTRATION; INTRACAUDAL at 03:11

## 2023-11-21 RX ADMIN — HYDROXYZINE HYDROCHLORIDE 25 MG: 25 TABLET ORAL at 05:11

## 2023-11-21 RX ADMIN — BUDESONIDE 0.5 MG: 0.5 INHALANT RESPIRATORY (INHALATION) at 07:11

## 2023-11-21 RX ADMIN — FAMOTIDINE 20 MG: 10 INJECTION INTRAVENOUS at 09:11

## 2023-11-21 RX ADMIN — PIPERACILLIN AND TAZOBACTAM 4.5 G: 4; .5 INJECTION, POWDER, LYOPHILIZED, FOR SOLUTION INTRAVENOUS; PARENTERAL at 10:11

## 2023-11-21 RX ADMIN — SODIUM CHLORIDE SOLN NEBU 3% 4 ML: 3 NEBU SOLN at 07:11

## 2023-11-21 RX ADMIN — IPRATROPIUM BROMIDE AND ALBUTEROL SULFATE 3 ML: 2.5; .5 SOLUTION RESPIRATORY (INHALATION) at 07:11

## 2023-11-21 RX ADMIN — METHYLPREDNISOLONE SODIUM SUCCINATE 80 MG: 40 INJECTION, POWDER, FOR SOLUTION INTRAMUSCULAR; INTRAVENOUS at 09:11

## 2023-11-21 RX ADMIN — ARFORMOTEROL TARTRATE 15 MCG: 15 SOLUTION RESPIRATORY (INHALATION) at 07:11

## 2023-11-21 RX ADMIN — FOLIC ACID 1 MG: 1 TABLET ORAL at 09:11

## 2023-11-21 RX ADMIN — DEXTROSE MONOHYDRATE 100 MG: 5 INJECTION INTRAVENOUS at 11:11

## 2023-11-21 RX ADMIN — HYDROXYZINE HYDROCHLORIDE 25 MG: 25 TABLET ORAL at 09:11

## 2023-11-21 RX ADMIN — VANCOMYCIN HYDROCHLORIDE 1500 MG: 1.5 INJECTION, POWDER, LYOPHILIZED, FOR SOLUTION INTRAVENOUS at 05:11

## 2023-11-21 RX ADMIN — IPRATROPIUM BROMIDE AND ALBUTEROL SULFATE 3 ML: 2.5; .5 SOLUTION RESPIRATORY (INHALATION) at 11:11

## 2023-11-21 RX ADMIN — CLOPIDOGREL BISULFATE 75 MG: 75 TABLET ORAL at 09:11

## 2023-11-21 RX ADMIN — ATORVASTATIN CALCIUM 40 MG: 40 TABLET, FILM COATED ORAL at 09:11

## 2023-11-21 RX ADMIN — MUPIROCIN: 20 OINTMENT TOPICAL at 09:11

## 2023-11-21 RX ADMIN — DEXTROSE MONOHYDRATE 100 MG: 5 INJECTION INTRAVENOUS at 12:11

## 2023-11-21 RX ADMIN — ASPIRIN 81 MG CHEWABLE TABLET 81 MG: 81 TABLET CHEWABLE at 09:11

## 2023-11-21 RX ADMIN — Medication 6 MG: at 09:11

## 2023-11-21 RX ADMIN — FUROSEMIDE 40 MG: 10 INJECTION, SOLUTION INTRAVENOUS at 10:11

## 2023-11-21 RX ADMIN — PIPERACILLIN AND TAZOBACTAM 4.5 G: 4; .5 INJECTION, POWDER, LYOPHILIZED, FOR SOLUTION INTRAVENOUS; PARENTERAL at 05:11

## 2023-11-21 RX ADMIN — METOPROLOL TARTRATE 12.5 MG: 25 TABLET, FILM COATED ORAL at 09:11

## 2023-11-21 RX ADMIN — IPRATROPIUM BROMIDE AND ALBUTEROL SULFATE 3 ML: 2.5; .5 SOLUTION RESPIRATORY (INHALATION) at 01:11

## 2023-11-21 RX ADMIN — Medication 50 MG: at 09:11

## 2023-11-21 RX ADMIN — MUPIROCIN: 20 OINTMENT TOPICAL at 10:11

## 2023-11-21 RX ADMIN — FLUOXETINE HYDROCHLORIDE 20 MG: 20 SOLUTION ORAL at 09:11

## 2023-11-21 RX ADMIN — DEXTROSE MONOHYDRATE 100 MG: 5 INJECTION INTRAVENOUS at 01:11

## 2023-11-21 NOTE — PLAN OF CARE
Case Management Assessment     PCP: Dr. Tubbs  Pharmacy: [facility]    Patient Arrived From: Home  Existing Help at Home: Bridgett- spouse    Barriers to Discharge: None    Discharge Plan:    A. Home with family   B. Other- tbd      CM discussed discharge planning with pt's spouse, Bridgett. Pt is independent and has the following equipment:  hospital bed, suction machine, respiratory supplies, bedside commode, shower chair, walker and wheelchair ([employer]). Pt's spouse will provide transportation home.       11/21/23 4442   Discharge Assessment   Assessment Type Discharge Planning Assessment   Confirmed/corrected address, phone number and insurance Yes   Confirmed Demographics Correct on Facesheet   Source of Information family   When was your last doctors appointment? 10/09/23   Reason For Admission Acute on chronic respiratory failure with hypoxia   Facility Arrived From: home   Do you expect to return to your current living situation? Yes   Do you have help at home or someone to help you manage your care at home? Yes   Who are your caregiver(s) and their phone number(s)? Cat Richard (Spouse)  [phone]   Prior to hospitilization cognitive status: Alert/Oriented   Current cognitive status: Unable to Assess   Mobility Management walker,wc as needed   Equipment Currently Used at Home bedside commode;hospital bed;suction machine;nutrition supplies;feeding device;respiratory supplies;walker, rolling;oxygen   Readmission within 30 days? Yes   Patient currently being followed by outpatient case management? No   Do you currently have service(s) that help you manage your care at home? No   Do you take prescription medications? Yes   Do you have prescription coverage? Yes   Coverage [employer]   Do you have any problems affording any of your prescribed medications? No   Is the patient taking medications as prescribed? yes   Who is going to help you get home at discharge? Bridgett- spouse   How do you get to  doctors appointments? family or friend will provide   Are you on dialysis? No   Do you take coumadin? No   DME Needed Upon Discharge  other (see comments)  (tbd)   Discharge Plan discussed with: Spouse/sig other   Name(s) and Number(s) Cat Richard (Spouse)  [phone]   Transition of Care Barriers None   Discharge Plan A Home with family   Discharge Plan B Other  (tbd)   Physical Activity   On average, how many days per week do you engage in moderate to strenuous exercise (like a brisk walk)? 0 days   On average, how many minutes do you engage in exercise at this level? 0 min   Financial Resource Strain   How hard is it for you to pay for the very basics like food, housing, medical care, and heating? Not very   Housing Stability   In the last 12 months, was there a time when you were not able to pay the mortgage or rent on time? N   In the last 12 months, how many places have you lived? 1   In the last 12 months, was there a time when you did not have a steady place to sleep or slept in a shelter (including now)? N   Transportation Needs   In the past 12 months, has lack of transportation kept you from medical appointments or from getting medications? no   In the past 12 months, has lack of transportation kept you from meetings, work, or from getting things needed for daily living? No   Food Insecurity   Within the past 12 months, you worried that your food would run out before you got the money to buy more. Never true   Within the past 12 months, the food you bought just didn't last and you didn't have money to get more. Never true   Stress   Do you feel stress - tense, restless, nervous, or anxious, or unable to sleep at night because your mind is troubled all the time - these days?   (unable to assess)   Social Connections   In a typical week, how many times do you talk on the phone with family, friends, or neighbors? Three   How often do you get together with friends or relatives? Three times   How often  do you attend Judaism or Orthodoxy services? Never   Do you belong to any clubs or organizations such as Judaism groups, unions, fraternal or athletic groups, or school groups? No   How often do you attend meetings of the clubs or organizations you belong to? Never   Are you , , , , never , or living with a partner?    Alcohol Use   Q1: How often do you have a drink containing alcohol? Never   Q2: How many drinks containing alcohol do you have on a typical day when you are drinking? None   Q3: How often do you have six or more drinks on one occasion? Never

## 2023-11-21 NOTE — HPI
Mr. 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 He reports worsening secretion from his tracheostomy and wheezing.      In the ED, the patient was tachycardic (110), tachypneic and hypoxic (SpO2:  85%), and was placed on AVAPS.  Labs were remarkable for an elevated BNP (1708), elevated troponin (0.030) and a lactic acid of 2.9.  VBG showed a pH of 7.36, pCO2 of 60.3, HC03 of 35.8. Chest x-ray with increased interstitial markings and atelectasis vs airspace disease at the lung bases. He was admitted to the ICU and pulmonary was consulted for respiratory failure.

## 2023-11-21 NOTE — ASSESSMENT & PLAN NOTE
Acute on chronic hypoxic respiratory failure is likely due to pneumonia vs COPD exacerbation +/- volume overload. 6.0 cuffless Shiley in place, typically on 5L trach collar. Increased sputum noted over the past several days. Know diastolic dysfunction with elevated BNP.     - Currently on AVAPS with significant improvement. Wean to trach collar as able. If worsening respiratory distress develops, we are available to change out trach for cuffed Shiley, but this is not necessary at this time.   - Agree with broad spectrum antibiotic coverage given the various different respiratory pathogens isolated in the past.  - Diurese as tolerated  - continue steroids and bronchodilators     6 (moderate pain)

## 2023-11-21 NOTE — PROGRESS NOTES
"Tele ICU Note    72 y/o male with a PMH of squamous cell carcinoma of the tongue S/P tracheostomy and PEG, COPD, CAD, HLD, and anxiety presents with complaints of shortness of breath. EMS reported wheezing and a Duo neb was given.    Currently;    /83   Pulse 107   Temp 97.5 °F (36.4 °C) (Axillary)   Resp (!) 22   Ht 5' 8" (1.727 m)   Wt 59.1 kg (130 lb 4.7 oz)   SpO2 100%   BMI 19.81 kg/m²     Labs:    CBC: WBC 7.66/ Hgb 10.4/ Hct 34.3/ plts  226 K    BMP: Na 138/ K 4.3/ Cl 94/ CO2 34/ BUN 32/ Cr 0.8/ glucose 181    BNP: 1708  Troponin: 0.030    VBG on Trach collar: pH 7.38/ pCO2 60.3/ pO2 26    Chest Xray: 11/20/23: Final reading  FINDINGS:  Tracheostomy tube in good position.  The cardiac silhouette is enlarged.  The pulmonary vascularity appears normal.  Abnormal increased interstitial lung markings, both lung fields, unchanged.  Subsegmental atelectasis or minimal airspace disease at the lung basis, similar to the prior exam.  No large pleural effusion.  No pneumothorax.  The osseous structures appear normal.  Impression:     No significant change from the prior study.    Impression;    -Acute hypoxic respiratory failure  -COPD exacerbation  -Ca tongue S/P tracheostomy and PEG    Plan; Continue present management of Duo nebs, corticosteroids and diuresis. Continue doxycycline and BIPAP   "

## 2023-11-21 NOTE — HOSPITAL COURSE
Mr Fareed Richard . Was admitted with acute hypoxic respiratory failure secondary to Pseudomonas PNA and COPD exacerbation. On oxygen 6 L trach collar and tube feeding at home. Started BiPAP, zosyn, and admitted to ICU. Stepped down to floor 11/24. Lung coarse and still requiring 8 L oxygen. Continue zoysn  duoneb/NS 3% neb, PO prednisone. RT following closely and wean to keep 88-90%.  Tolerating TF at goal 40 cc/hr isosource. Adding free water for hypernatremia which is improving. Discussed with patient about increasing water for hte next few days at home. PT/OT evaluation completed. Low intensity on discharge. Completed 7 day course antibx for Pseudomonas pneumonia. Slowly weaning O2 to home. Stable for discharge home.

## 2023-11-21 NOTE — H&P
[facility] Emergency [facility] Medicine  History & Physical    Patient Name: Fareed Richard Jr.  MRN: [id number]  Patient Class: IP- Inpatient  Admission Date: 11/20/2023  Attending Physician: Nikita Castillo III, MD   Primary Care Provider: Kodi Tubbs MD         Patient information was obtained from patient, spouse/SO, and ER records.     Subjective:     Principal Problem:Acute hypoxemic respiratory failure    Chief Complaint:   Chief Complaint   Patient presents with    Shortness of Breath     Pt BIB EMS from home for SOB. EMS reported 90% SPO2 on 4 LPM trach mask. EMS reported suctioning Pt and O2 saturation decreased. EMS reported wheezing and gave a duo nebulizer treatment.        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     Patient presents with worsening shortness of breath that started 3 days prior to presentation.  He additionally reports worsening secretion from his tracheostomy.  Additional symptoms include wheezing.    In the ED, the patient was tachycardic (110), tachypneic and hypoxic (SpO2:  85%), and was placed on BiPAP.  Labs were remarkable for an elevated BNP (1708), elevated troponin (0.030).  VBG showed a pH of 7.36, pCO2 of 60.3, HC03 of 35.8.  Chest x-ray showed no significant change from prior studies.  Patient was given Lasix 40 mg IV, Solu-Medrol 80 mg IV, vancomycin and Zosyn.  He was admitted for further management.    Past Medical History:   Diagnosis Date    Cancer     skin to Rt forearm and face    COPD (chronic obstructive pulmonary disease)     Hyperlipidemia     Hypertension     Pseudoaneurysm        Past Surgical History:   Procedure Laterality Date    ABDOMINAL SURGERY      stents placed in liver and large intestines, per patient    ANGIOGRAPHY OF AORTIC ARCH  3/27/2023    Procedure: Aortogram, Aortic Arch;   Surgeon: Tim Bhatt MD;  Location: [facility] CATH LAB;  Service: Cardiology;;    AORTOGRAPHY WITH SERIALOGRAPHY N/A 3/27/2023    Procedure: AORTOGRAM, WITH SERIALOGRAPHY;  Surgeon: Tim Bhatt MD;  Location: [facility] CATH LAB;  Service: Cardiology;  Laterality: N/A;    CAROTID STENT      COLONOSCOPY N/A 09/27/2022    Procedure: COLONOSCOPY;  Surgeon: Donnie Peterson MD;  Location: [facility] ENDO (2ND FLR);  Service: Endoscopy;  Laterality: N/A;    COLONOSCOPY N/A 09/30/2022    Procedure: COLONOSCOPY;  Surgeon: Joy Cabrera MD;  Location: [facility] ENDO (2ND FLR);  Service: Endoscopy;  Laterality: N/A;    CORONARY STENT PLACEMENT  01/2000    DISSECTION OF NECK Bilateral 01/04/2022    Procedure: DISSECTION, NECK;  Surgeon: Naeem Smalls MD;  [facility];  Service: ENT;  Laterality: Bilateral;    ESOPHAGOGASTRODUODENOSCOPY N/A 09/27/2022    Procedure: EGD (ESOPHAGOGASTRODUODENOSCOPY);  Surgeon: Donnie Peterson MD;  Location: [facility] ENDO (2ND FLR);  Service: Endoscopy;  Laterality: N/A;    ESOPHAGOGASTRODUODENOSCOPY N/A 09/30/2022    Procedure: EGD (ESOPHAGOGASTRODUODENOSCOPY);  Surgeon: Joy Cabrera MD;  Location: [facility] ENDO (2ND FLR);  Service: Endoscopy;  Laterality: N/A;    ESOPHAGOGASTRODUODENOSCOPY W/ PEG N/A 01/04/2022    Procedure: EGD, WITH PEG TUBE INSERTION;  Surgeon: Anthony Calabrese MD;  [facility];  Service: General;  Laterality: N/A;    EYE SURGERY      Cataract bilateral    femoral stents      bilateral    FLAP PROCEDURE N/A 01/03/2022    Procedure: CREATION, FREE FLAP;  Surgeon: Naeem Smalls MD;  [facility];  Service: ENT;  Laterality: N/A;    FLAP PROCEDURE Right 01/04/2022    Procedure: CREATION, FREE FLAP;  Surgeon: Stacey Conde MD;  [facility];  Service: ENT;  Laterality: Right;  Ischemic start 1203  Ischemic stop 1353    GLOSSECTOMY N/A 01/04/2022    Procedure: GLOSSECTOMY;  Surgeon: Naeem Smalls MD;  Location: [facility] OR  2ND FLR;  Service: ENT;  Laterality: N/A;    LEFT HEART CATHETERIZATION Left 3/23/2023    Procedure: Left heart cath;  Surgeon: Tacos Benitez MD;  Location: [facility] CATH LAB;  Service: Cardiology;  Laterality: Left;    PERCUTANEOUS TRANSLUMINAL BALLOON ANGIOPLASTY OF CORONARY ARTERY Left 3/27/2023    Procedure: Angioplasty-coronary;  Surgeon: Tim Bhatt MD;  Location: [facility] CATH LAB;  Service: Cardiology;  Laterality: Left;  not before 9am, R rad access, 6 Fr EBU 3.5 guide    RADICAL NECK DISSECTION Left 01/03/2022    Procedure: DISSECTION, NECK, RADICAL;  Surgeon: Naeem Smalls MD;  Location: [facility] OR [facility] FLR;  Service: ENT;  Laterality: Left;    SKIN BIOPSY      SKIN CANCER EXCISION      STENT, CORONARY, MULTI VESSEL  3/27/2023    Procedure: Stent, Coronary, Multi Vessel;  Surgeon: Tim Bhatt MD;  Location: [facility] CATH LAB;  Service: Cardiology;;    TRACHEOTOMY N/A 01/04/2022    Procedure: TRACHEOTOMY;  Surgeon: Naeem Smalls MD;  [facility];  Service: ENT;  Laterality: N/A;    VASCULAR SURGERY         Review of patient's allergies indicates:   Allergen Reactions    Ativan [lorazepam] Anxiety       No current facility-administered medications on file prior to encounter.     Current Outpatient Medications on File Prior to Encounter   Medication Sig    albuterol-ipratropium (DUO-NEB) 2.5 mg-0.5 mg/3 mL nebulizer solution Take 3 mLs by nebulization every 4 (four) hours as needed for Wheezing. Rescue    ascorbic acid, vitamin C, (VITAMIN C) 100 MG tablet Take 100 mg by mouth once daily.    aspirin 81 MG Chew Take 1 tablet (81 mg total) by mouth once daily.    atorvastatin (LIPITOR) 40 MG tablet 1 tablet (40 mg total) by Per G Tube route every evening. (Patient taking differently: 40 mg by Per G Tube route once daily.)    clopidogreL (PLAVIX) 75 mg tablet Take 1 tablet (75 mg total) by mouth once daily. (Patient taking differently: Take 75 mg by mouth nightly.)    ferrous  sulfate 325 (65 FE) MG EC tablet Take 325 mg by mouth 3 (three) times daily with meals.    FLUoxetine (PROZAC) 20 mg/5 mL (4 mg/mL) solution Take 20 mg by mouth once daily.    folic acid (FOLVITE) 1 MG tablet Take 1 mg by mouth once daily.    furosemide (LASIX) 40 MG tablet Take 0.5 tablets (20 mg total) by mouth once daily. (Patient taking differently: Take 20 mg by mouth nightly.)    metoprolol tartrate (LOPRESSOR) 25 MG tablet Take 0.5 tablets (12.5 mg total) by mouth 2 (two) times daily.    omeprazole (PRILOSEC) 40 MG capsule Take 40 mg (contents of one capsule) twice daily through PEG tube. Mix contents of capsule with 10 mL applesauce. (Patient taking differently: 40 mg 2 (two) times a day. Take 40 mg (contents of one capsule) twice daily through PEG tube. Mix contents of capsule with 10 mL applesauce.)    thiamine (VITAMIN B-1) 50 MG tablet Take 50 mg by mouth once daily.    WIXELA INHUB 250-50 mcg/dose diskus inhaler SMARTSI Puff(s) By Mouth Every 12 Hours    bisacodyL (DULCOLAX) 10 mg Supp Place 1 suppository (10 mg total) rectally daily as needed.    glycopyrrolate (CUVPOSA) 1 mg/5 mL (0.2 mg/mL) Soln Take 5 mLs (1 mg total) by mouth 3 (three) times daily as needed (TO REDUCE SECRETIONS).    hydrOXYzine HCL (ATARAX) 25 MG tablet SMARTSI.5 Tablet(s) Gastro Tube Every 8 Hours PRN    melatonin (MELATIN) 3 mg tablet 1 tablet (3 mg total) by Per G Tube route nightly.    polyethylene glycol (GLYCOLAX) 17 gram PwPk 17 g by Per G Tube route 2 (two) times daily.    sodium chloride 3% 3 % nebulizer solution Take 4 mLs by nebulization 2 (two) times a day.    [DISCONTINUED] losartan (COZAAR) 50 MG tablet 1 tablet (50 mg total) by Per G Tube route once daily.     Family History    None       Tobacco Use    Smoking status: Former     Current packs/day: 0.00     Average packs/day: 2.0 packs/day for 55.0 years (110.0 ttl pk-yrs)     Types: Cigarettes     Start date: 1963     Quit date: 2018     Years since  quittin.5    Smokeless tobacco: Never    Tobacco comments:     3/3 ppd x 40 yrs. Currently 3-4 cigarettes daily .He is trying  to quit. Is using a Vapor cigarettes  2-3 x's a day.   Substance and Sexual Activity    Alcohol use: Not Currently    Drug use: No    Sexual activity: Not Currently     Review of Systems   Constitutional: Negative.    HENT: Negative.     Eyes: Negative.    Respiratory:  Positive for cough, shortness of breath and wheezing.    Cardiovascular: Negative.    Gastrointestinal: Negative.    Endocrine: Negative.    Genitourinary: Negative.    Musculoskeletal: Negative.    Skin: Negative.    Allergic/Immunologic: Negative.    Neurological: Negative.    Hematological: Negative.    Psychiatric/Behavioral: Negative.       Objective:     Vital Signs (Most Recent):  Temp: 98.1 °F (36.7 °C) (23)  Pulse: 103 (23)  Resp: (!) 29 (23)  BP: 137/81 (23)  SpO2: 99 % (23) Vital Signs (24h Range):  Temp:  [98.1 °F (36.7 °C)-98.2 °F (36.8 °C)] 98.1 °F (36.7 °C)  Pulse:  [103-118] 103  Resp:  [25-34] 29  SpO2:  [85 %-100 %] 99 %  BP: (131-143)/(75-83) 137/81     Weight: 68 kg (150 lb)  Body mass index is 22.81 kg/m².     Physical Exam  Vitals and nursing note reviewed.   Constitutional:       General: He is in acute distress.      Appearance: He is underweight. He is ill-appearing.   HENT:      Head: Normocephalic and atraumatic.      Nose: Nose normal.      Mouth/Throat:      Mouth: Mucous membranes are moist.   Eyes:      Extraocular Movements: Extraocular movements intact.   Cardiovascular:      Rate and Rhythm: Normal rate.      Pulses: Normal pulses.      Heart sounds: No murmur heard.  Pulmonary:      Effort: Respiratory distress present.      Breath sounds: Rhonchi present.      Comments: Trach in place, connected to BiPAP.   Abdominal:      General: Abdomen is flat.      Palpations: Abdomen is soft.      Tenderness: There is no abdominal tenderness.       Comments: PEG tube in place   Musculoskeletal:      Right lower leg: No edema.      Left lower leg: No edema.   Skin:     General: Skin is warm.      Capillary Refill: Capillary refill takes less than 2 seconds.   Neurological:      General: No focal deficit present.      Mental Status: He is alert.                Significant Labs: All pertinent labs within the past 24 hours have been reviewed.    Significant Imaging: I have reviewed all pertinent imaging results/findings within the past 24 hours.  Assessment/Plan:     Chronic diastolic heart failure  Results for orders placed during the hospital encounter of 03/18/23    Echo    Interpretation Summary  · The left ventricle is normal in size with concentric hypertrophy and normal systolic function.  · The estimated ejection fraction is 60%. Inferobasal hypokinesis  · Indeterminate left ventricular diastolic function.  · Normal right ventricular size with normal right ventricular systolic function.  · Mild left atrial enlargement.  · Mild right atrial enlargement.  · Normal central venous pressure (3 mmHg).  · The estimated PA systolic pressure is 13 mmHg.  · The sinuses of Valsalva is mildly dilated.  BNP  Recent Labs   Lab 11/20/23  1606   BNP 1,708*     Patient does not appear to be volume overloaded on exam  Resume home Lasix, Lasix IV PRN       Acute hypoxemic respiratory failure  Patient with Hypoxic Respiratory failure which is Acute.  he is not on home oxygen. Supplemental oxygen was provided and noted- Oxygen Concentration (%):  [55] 55  Likely in the setting of COPD exacerbation/possible underlying pneumonia.   CXR unremarkable. Obtain respiratory cultures, continue treatment for pneumonia if positive.     PEG (percutaneous endoscopic gastrostomy) status  History noted.     Tracheostomy present  History noted.       Severe protein-calorie malnutrition  Nutrition consulted. Most recent weight and BMI monitored-     Measurements:  Wt Readings from Last 1  Encounters:   11/20/23 68 kg (150 lb)   Body mass index is 22.81 kg/m².    Patient has been screened and assessed by RD.    Malnutrition Type:  Context:    Level:      Malnutrition Characteristic Summary:       Interventions/Recommendations (treatment strategy):         COPD exacerbation  Patient's COPD is with exacerbation noted by continued dyspnea and use of accessory muscles for breathing currently.  Patient is currently off COPD Pathway (ICU admit). Continue scheduled inhalers Steroids, Antibiotics, and Supplemental oxygen, scheduled nebs and monitor respiratory status closely.     Other hyperlipidemia  Resume statin      Primary hypertension  Resume home medications     Coronary artery disease involving native coronary artery of native heart with unstable angina pectoris  No acute issues.    Squamous cell cancer of tongue  History noted. Outpatient follow up         VTE Risk Mitigation (From admission, onward)           Ordered     heparin (porcine) injection 5,000 Units  Every 8 hours         11/20/23 1944     IP VTE HIGH RISK PATIENT  Once         11/20/23 1944     Place sequential compression device  Until discontinued         11/20/23 1944                               AdmissionCare    Guideline: COPD - INPT, Inpatient    Based on the indications selected for the patient, the bed status of Admit to Inpatient was determined to be MET    The following indications were selected as present at the time of evaluation of the patient:      High-risk active acute comorbidity (eg, dysrhythmia, heart failure, pleural effusion, pneumothorax, myopathy, rib fracture) with new or worsened (eg, from baseline) signs or symptoms of COPD (eg, dyspnea, Tachypnea)   -     AdmissionCare documentation entered by: Bernie Avila    [facility], 27th edition, Copyright © 2023 [facility], [facility] All Rights Reserved.  [id number]    David Jimenez MD  Department of Hospital Medicine  [facility] - Emergency  Dept    N/A  Family history is reviewed and has not changed   Pertinent information:

## 2023-11-21 NOTE — ASSESSMENT & PLAN NOTE
Results for orders placed during the hospital encounter of 03/18/23    Echo    Interpretation Summary  · The left ventricle is normal in size with concentric hypertrophy and normal systolic function.  · The estimated ejection fraction is 60%. Inferobasal hypokinesis  · Indeterminate left ventricular diastolic function.  · Normal right ventricular size with normal right ventricular systolic function.  · Mild left atrial enlargement.  · Mild right atrial enlargement.  · Normal central venous pressure (3 mmHg).  · The estimated PA systolic pressure is 13 mmHg.  · The sinuses of Valsalva is mildly dilated.  BNP  Recent Labs   Lab 11/20/23  1606   BNP 1,708*     Patient does not appear to be volume overloaded on exam  Resume home Lasix, Lasix IV PRN

## 2023-11-21 NOTE — ASSESSMENT & PLAN NOTE
Patient with Hypoxic Respiratory failure which is Acute on chronic.  he is on home oxygen at 6 LPM trach collar. Supplemental oxygen was provided and noted- Oxygen Concentration (%):  [40-55] 40  Likely in the setting of COPD exacerbation/possible underlying pneumonia.   CXR unremarkable.   - continue COPD treatment   - continue broad spectrum antibiotics. Trach aspirate culture is pending  - BNP elevated, potentially CHF also contributing. Lasix 40mg IV x1 today.   Recent Labs   Lab 11/20/23  1606   BNP 1,708*   - Pulmonary is following

## 2023-11-21 NOTE — SUBJECTIVE & OBJECTIVE
Past Medical History:   Diagnosis Date    Cancer     skin to Rt forearm and face    COPD (chronic obstructive pulmonary disease)     Hyperlipidemia     Hypertension     Pseudoaneurysm        Past Surgical History:   Procedure Laterality Date    ABDOMINAL SURGERY      stents placed in liver and large intestines, per patient    ANGIOGRAPHY OF AORTIC ARCH  3/27/2023    Procedure: Aortogram, Aortic Arch;  Surgeon: Tim Bhatt MD;  Location: [facility] CATH LAB;  Service: Cardiology;;    AORTOGRAPHY WITH SERIALOGRAPHY N/A 3/27/2023    Procedure: AORTOGRAM, WITH SERIALOGRAPHY;  Surgeon: Tim Bhatt MD;  Location: [facility] CATH LAB;  Service: Cardiology;  Laterality: N/A;    CAROTID STENT      COLONOSCOPY N/A 09/27/2022    Procedure: COLONOSCOPY;  Surgeon: Donnie Peterson MD;  [facility]  Service: Endoscopy;  Laterality: N/A;    COLONOSCOPY N/A 09/30/2022    Procedure: COLONOSCOPY;  Surgeon: Joy Cabrera MD;  [facility]  Service: Endoscopy;  Laterality: N/A;    CORONARY STENT PLACEMENT  01/2000    DISSECTION OF NECK Bilateral 01/04/2022    Procedure: DISSECTION, NECK;  Surgeon: Naeem Smalls MD;  [facility];  Service: ENT;  Laterality: Bilateral;    ESOPHAGOGASTRODUODENOSCOPY N/A 09/27/2022    Procedure: EGD (ESOPHAGOGASTRODUODENOSCOPY);  Surgeon: Donnie Peterson MD;  [facility]  Service: Endoscopy;  Laterality: N/A;    ESOPHAGOGASTRODUODENOSCOPY N/A 09/30/2022    Procedure: EGD (ESOPHAGOGASTRODUODENOSCOPY);  Surgeon: Joy Cabrera MD;  Location: [facility] ENDO (2ND FLR);  Service: Endoscopy;  Laterality: N/A;    ESOPHAGOGASTRODUODENOSCOPY W/ PEG N/A 01/04/2022    Procedure: EGD, WITH PEG TUBE INSERTION;  Surgeon: Anthony Calabrese MD;  [facility];  Service: General;  Laterality: N/A;    EYE SURGERY      Cataract bilateral    femoral stents      bilateral    FLAP PROCEDURE N/A 01/03/2022    Procedure: CREATION, FREE FLAP;   Surgeon: Naeem Smalls MD;  Location: [facility] OR [facility] FLR;  Service: ENT;  Laterality: N/A;    FLAP PROCEDURE Right 2022    Procedure: CREATION, FREE FLAP;  Surgeon: Stacey Conde MD;  Location: [facility] OR 2ND FLR;  Service: ENT;  Laterality: Right;  Ischemic start 1203  Ischemic stop 1353    GLOSSECTOMY N/A 2022    Procedure: GLOSSECTOMY;  Surgeon: Naeem Smalls MD;  Location: [facility] OR [facility] FLR;  Service: ENT;  Laterality: N/A;    LEFT HEART CATHETERIZATION Left 3/23/2023    Procedure: Left heart cath;  Surgeon: Tacos Benitez MD;  Location: [facility] CATH LAB;  Service: Cardiology;  Laterality: Left;    PERCUTANEOUS TRANSLUMINAL BALLOON ANGIOPLASTY OF CORONARY ARTERY Left 3/27/2023    Procedure: Angioplasty-coronary;  Surgeon: Tim Bhatt MD;  Location: [facility] CATH LAB;  Service: Cardiology;  Laterality: Left;  not before 9am, R rad access, 6 Fr EBU 3.5 guide    RADICAL NECK DISSECTION Left 2022    Procedure: DISSECTION, NECK, RADICAL;  Surgeon: Naeem Smalls MD;  [facility];  Service: ENT;  Laterality: Left;    SKIN BIOPSY      SKIN CANCER EXCISION      STENT, CORONARY, MULTI VESSEL  3/27/2023    Procedure: Stent, Coronary, Multi Vessel;  Surgeon: Tim Bhatt MD;  Location: [facility] CATH LAB;  Service: Cardiology;;    TRACHEOTOMY N/A 2022    Procedure: TRACHEOTOMY;  Surgeon: Naeem Smalls MD;  [facility];  Service: ENT;  Laterality: N/A;    VASCULAR SURGERY         Review of patient's allergies indicates:   Allergen Reactions    Ativan [lorazepam] Anxiety       Family History    None       Tobacco Use    Smoking status: Former     Current packs/day: 0.00     Average packs/day: 2.0 packs/day for 55.0 years (110.0 ttl pk-yrs)     Types: Cigarettes     Start date: 1963     Quit date: 2018     Years since quittin.6    Smokeless tobacco: Never    Tobacco comments:     3/3 ppd x 40 yrs. Currently 3-4 cigarettes daily .He is  trying  to quit. Is using a Vapor cigarettes  2-3 x's a day.   Substance and Sexual Activity    Alcohol use: Not Currently    Drug use: No    Sexual activity: Not Currently         Review of Systems   Reason unable to perform ROS: trach.     Objective:     Vital Signs (Most Recent):  Temp: 96.9 °F (36.1 °C) (11/21/23 0800)  Pulse: 89 (11/21/23 1000)  Resp: 19 (11/21/23 1000)  BP: (!) 151/93 (11/21/23 1000)  SpO2: 96 % (11/21/23 1000) Vital Signs (24h Range):  Temp:  [96.9 °F (36.1 °C)-98.2 °F (36.8 °C)] 96.9 °F (36.1 °C)  Pulse:  [] 89  Resp:  [12-35] 19  SpO2:  [85 %-100 %] 96 %  BP: (114-151)/(68-93) 151/93     Weight: 59.1 kg (130 lb 4.7 oz)  Body mass index is 19.81 kg/m².      Intake/Output Summary (Last 24 hours) at 11/21/2023 1020  Last data filed at 11/21/2023 0934  Gross per 24 hour   Intake 333.68 ml   Output 525 ml   Net -191.32 ml        Physical Exam  Vitals and nursing note reviewed.   Constitutional:       General: He is not in acute distress.     Appearance: He is ill-appearing. He is not toxic-appearing.   HENT:      Nose:      Comments: Abrasion on nose - present on admission  Eyes:      Conjunctiva/sclera: Conjunctivae normal.   Neck:      Comments: Size 6 Uncuffed Shiley tracheostomy tube in place   Cardiovascular:      Rate and Rhythm: Normal rate and regular rhythm.      Pulses: Normal pulses.      Heart sounds: Normal heart sounds.   Pulmonary:      Effort: Pulmonary effort is normal. No respiratory distress.      Breath sounds: Normal breath sounds. No wheezing, rhonchi or rales.      Comments: On AVAPS  Abdominal:      General: Bowel sounds are normal. There is no distension.      Palpations: Abdomen is soft.      Tenderness: There is no abdominal tenderness. There is no guarding.      Comments: PEG in place   Musculoskeletal:      Right lower leg: No edema.      Left lower leg: No edema.   Skin:     General: Skin is warm and dry.   Neurological:      Mental Status: He is alert. Mental  status is at baseline.      Comments: Awake and alert, follows commands         Vents:  Oxygen Concentration (%): 40 (weaned from 55% sats were 100%) (11/21/23 0746)    Lines/Drains/Airways       Drain  Duration                  Gastrostomy/Enterostomy 01/04/22 Percutaneous endoscopic gastrostomy (PEG)  days              Airway  Duration             Adult Surgical Airway 03/16/23 1014 Shiley Cuffed 6.0 250 days              Peripheral Intravenous Line  Duration                  Peripheral IV - Single Lumen 11/20/23 20 G Anterior;Distal;Left Upper Arm 1 day         Peripheral IV - Single Lumen 11/20/23 1718 20 G Anterior;Right Forearm <1 day                    Significant Labs:    CBC/Anemia Profile:  Recent Labs   Lab 11/20/23  1606   WBC 7.66   HGB 10.4*   HCT 34.3*      MCV 93   RDW 13.9        Chemistries:  Recent Labs   Lab 11/20/23  1606      K 4.3   CL 94*   CO2 34*   BUN 32*   CREATININE 0.8   CALCIUM 9.5   ALBUMIN 3.1*   PROT 7.7   BILITOT 0.2   ALKPHOS 115   ALT 17   AST 17       All pertinent labs within the past 24 hours have been reviewed.    Significant Imaging:   I have reviewed all pertinent imaging results/findings within the past 24 hours.

## 2023-11-21 NOTE — SUBJECTIVE & OBJECTIVE
Interval History: Complaining of throat pain.     Review of Systems   HENT:  Positive for sore throat.    Respiratory:  Negative for shortness of breath.    Cardiovascular:  Negative for chest pain.   Gastrointestinal:  Negative for abdominal pain.     Objective:     Vital Signs (Most Recent):  Temp: 96.9 °F (36.1 °C) (11/21/23 0800)  Pulse: 89 (11/21/23 1000)  Resp: 19 (11/21/23 1000)  BP: (!) 151/93 (11/21/23 1000)  SpO2: 96 % (11/21/23 1000) Vital Signs (24h Range):  Temp:  [96.9 °F (36.1 °C)-98.2 °F (36.8 °C)] 96.9 °F (36.1 °C)  Pulse:  [] 89  Resp:  [12-35] 19  SpO2:  [85 %-100 %] 96 %  BP: (114-151)/(68-93) 151/93     Weight: 59.1 kg (130 lb 4.7 oz)  Body mass index is 19.81 kg/m².    Intake/Output Summary (Last 24 hours) at 11/21/2023 1019  Last data filed at 11/21/2023 0934  Gross per 24 hour   Intake 333.68 ml   Output 525 ml   Net -191.32 ml         Physical Exam  Vitals and nursing note reviewed.   Constitutional:       General: He is not in acute distress.     Appearance: He is ill-appearing. He is not toxic-appearing.      Comments: Thin man   HENT:      Nose:      Comments: Scab on nose     Mouth/Throat:      Comments: Edentulous. No signs of thrush  Eyes:      Conjunctiva/sclera: Conjunctivae normal.   Neck:      Comments: Cuffless Trach in place  Cardiovascular:      Rate and Rhythm: Normal rate and regular rhythm.      Pulses: Normal pulses.      Heart sounds: Normal heart sounds.   Pulmonary:      Effort: Pulmonary effort is normal. No respiratory distress.      Breath sounds: Normal breath sounds. No wheezing, rhonchi or rales.      Comments: On AVAPS  Abdominal:      General: Bowel sounds are normal. There is no distension.      Palpations: Abdomen is soft.      Tenderness: There is no abdominal tenderness. There is no guarding.      Comments: PEG in place   Musculoskeletal:      Right lower leg: No edema.      Left lower leg: No edema.   Skin:     General: Skin is warm and dry.    Neurological:      Mental Status: He is alert. Mental status is at baseline.      Comments: Awake and alert, trying to speak             Significant Labs: All pertinent labs within the past 24 hours have been reviewed.    Significant Imaging: I have reviewed all pertinent imaging results/findings within the past 24 hours.

## 2023-11-21 NOTE — SUBJECTIVE & OBJECTIVE
Past Medical History:   Diagnosis Date    Cancer     skin to Rt forearm and face    COPD (chronic obstructive pulmonary disease)     Hyperlipidemia     Hypertension     Pseudoaneurysm        Past Surgical History:   Procedure Laterality Date    ABDOMINAL SURGERY      stents placed in liver and large intestines, per patient    ANGIOGRAPHY OF AORTIC ARCH  3/27/2023    Procedure: Aortogram, Aortic Arch;  Surgeon: Tim Bhatt MD;  Location: [facility] CATH LAB;  Service: Cardiology;;    AORTOGRAPHY WITH SERIALOGRAPHY N/A 3/27/2023    Procedure: AORTOGRAM, WITH SERIALOGRAPHY;  Surgeon: Tim Bhatt MD;  Location: [facility] CATH LAB;  Service: Cardiology;  Laterality: N/A;    CAROTID STENT      COLONOSCOPY N/A 09/27/2022    Procedure: COLONOSCOPY;  Surgeon: Donnie Peterson MD;  [facility]  Service: Endoscopy;  Laterality: N/A;    COLONOSCOPY N/A 09/30/2022    Procedure: COLONOSCOPY;  Surgeon: Joy Cabrera MD;  [facility]  Service: Endoscopy;  Laterality: N/A;    CORONARY STENT PLACEMENT  01/2000    DISSECTION OF NECK Bilateral 01/04/2022    Procedure: DISSECTION, NECK;  Surgeon: Naeem Smalls MD;  [facility];  Service: ENT;  Laterality: Bilateral;    ESOPHAGOGASTRODUODENOSCOPY N/A 09/27/2022    Procedure: EGD (ESOPHAGOGASTRODUODENOSCOPY);  Surgeon: Donnie Peterson MD;  [facility]  Service: Endoscopy;  Laterality: N/A;    ESOPHAGOGASTRODUODENOSCOPY N/A 09/30/2022    Procedure: EGD (ESOPHAGOGASTRODUODENOSCOPY);  Surgeon: Joy Cabrera MD;  Location: [facility] ENDO (2ND FLR);  Service: Endoscopy;  Laterality: N/A;    ESOPHAGOGASTRODUODENOSCOPY W/ PEG N/A 01/04/2022    Procedure: EGD, WITH PEG TUBE INSERTION;  Surgeon: Anthony Calabrese MD;  [facility];  Service: General;  Laterality: N/A;    EYE SURGERY      Cataract bilateral    femoral stents      bilateral    FLAP PROCEDURE N/A 01/03/2022    Procedure: CREATION, FREE FLAP;   Surgeon: Naeem Smalls MD;  [facility];  Service: ENT;  Laterality: N/A;    FLAP PROCEDURE Right 01/04/2022    Procedure: CREATION, FREE FLAP;  Surgeon: Stacey Conde MD;  [facility];  Service: ENT;  Laterality: Right;  Ischemic start 1203  Ischemic stop 1353    GLOSSECTOMY N/A 01/04/2022    Procedure: GLOSSECTOMY;  Surgeon: Naeem Smalls MD;  [facility];  Service: ENT;  Laterality: N/A;    LEFT HEART CATHETERIZATION Left 3/23/2023    Procedure: Left heart cath;  Surgeon: Tacos Benitez MD;  Location: [facility] CATH LAB;  Service: Cardiology;  Laterality: Left;    PERCUTANEOUS TRANSLUMINAL BALLOON ANGIOPLASTY OF CORONARY ARTERY Left 3/27/2023    Procedure: Angioplasty-coronary;  Surgeon: Tim Bhatt MD;  Location: [facility] CATH LAB;  Service: Cardiology;  Laterality: Left;  not before 9am, R rad access, 6 Fr EBU 3.5 guide    RADICAL NECK DISSECTION Left 01/03/2022    Procedure: DISSECTION, NECK, RADICAL;  Surgeon: Naeem Smalls MD;  Location: [facility] OR [address] [place] [address];  Service: ENT;  Laterality: Left;    SKIN BIOPSY      SKIN CANCER EXCISION      STENT, CORONARY, MULTI VESSEL  3/27/2023    Procedure: Stent, Coronary, Multi Vessel;  Surgeon: Tim Bhatt MD;  Location: [facility] CATH LAB;  Service: Cardiology;;    TRACHEOTOMY N/A 01/04/2022    Procedure: TRACHEOTOMY;  Surgeon: Naeem Smalls MD;  [facility];  Service: ENT;  Laterality: N/A;    VASCULAR SURGERY         Review of patient's allergies indicates:   Allergen Reactions    Ativan [lorazepam] Anxiety       No current facility-administered medications on file prior to encounter.     Current Outpatient Medications on File Prior to Encounter   Medication Sig    albuterol-ipratropium (DUO-NEB) 2.5 mg-0.5 mg/3 mL nebulizer solution Take 3 mLs by nebulization every 4 (four) hours as needed for Wheezing. Rescue    ascorbic acid, vitamin C, (VITAMIN C) 100 MG tablet Take 100 mg by mouth  once daily.    aspirin 81 MG Chew Take 1 tablet (81 mg total) by mouth once daily.    atorvastatin (LIPITOR) 40 MG tablet 1 tablet (40 mg total) by Per G Tube route every evening. (Patient taking differently: 40 mg by Per G Tube route once daily.)    clopidogreL (PLAVIX) 75 mg tablet Take 1 tablet (75 mg total) by mouth once daily. (Patient taking differently: Take 75 mg by mouth nightly.)    ferrous sulfate 325 (65 FE) MG EC tablet Take 325 mg by mouth 3 (three) times daily with meals.    FLUoxetine (PROZAC) 20 mg/5 mL (4 mg/mL) solution Take 20 mg by mouth once daily.    folic acid (FOLVITE) 1 MG tablet Take 1 mg by mouth once daily.    furosemide (LASIX) 40 MG tablet Take 0.5 tablets (20 mg total) by mouth once daily. (Patient taking differently: Take 20 mg by mouth nightly.)    metoprolol tartrate (LOPRESSOR) 25 MG tablet Take 0.5 tablets (12.5 mg total) by mouth 2 (two) times daily.    omeprazole (PRILOSEC) 40 MG capsule Take 40 mg (contents of one capsule) twice daily through PEG tube. Mix contents of capsule with 10 mL applesauce. (Patient taking differently: 40 mg 2 (two) times a day. Take 40 mg (contents of one capsule) twice daily through PEG tube. Mix contents of capsule with 10 mL applesauce.)    thiamine (VITAMIN B-1) 50 MG tablet Take 50 mg by mouth once daily.    WIXELA INHUB 250-50 mcg/dose diskus inhaler SMARTSI Puff(s) By Mouth Every 12 Hours    bisacodyL (DULCOLAX) 10 mg Supp Place 1 suppository (10 mg total) rectally daily as needed.    glycopyrrolate (CUVPOSA) 1 mg/5 mL (0.2 mg/mL) Soln Take 5 mLs (1 mg total) by mouth 3 (three) times daily as needed (TO REDUCE SECRETIONS).    hydrOXYzine HCL (ATARAX) 25 MG tablet SMARTSI.5 Tablet(s) Gastro Tube Every 8 Hours PRN    melatonin (MELATIN) 3 mg tablet 1 tablet (3 mg total) by Per G Tube route nightly.    polyethylene glycol (GLYCOLAX) 17 gram PwPk 17 g by Per G Tube route 2 (two) times daily.    sodium chloride 3% 3 % nebulizer solution Take 4  mLs by nebulization 2 (two) times a day.    [DISCONTINUED] losartan (COZAAR) 50 MG tablet 1 tablet (50 mg total) by Per G Tube route once daily.     Family History    None       Tobacco Use    Smoking status: Former     Current packs/day: 0.00     Average packs/day: 2.0 packs/day for 55.0 years (110.0 ttl pk-yrs)     Types: Cigarettes     Start date: 1963     Quit date: 2018     Years since quittin.5    Smokeless tobacco: Never    Tobacco comments:     3/3 ppd x 40 yrs. Currently 3-4 cigarettes daily .He is trying  to quit. Is using a Vapor cigarettes  2-3 x's a day.   Substance and Sexual Activity    Alcohol use: Not Currently    Drug use: No    Sexual activity: Not Currently     Review of Systems   Constitutional: Negative.    HENT: Negative.     Eyes: Negative.    Respiratory:  Positive for cough, shortness of breath and wheezing.    Cardiovascular: Negative.    Gastrointestinal: Negative.    Endocrine: Negative.    Genitourinary: Negative.    Musculoskeletal: Negative.    Skin: Negative.    Allergic/Immunologic: Negative.    Neurological: Negative.    Hematological: Negative.    Psychiatric/Behavioral: Negative.       Objective:     Vital Signs (Most Recent):  Temp: 98.1 °F (36.7 °C) (23)  Pulse: 103 (23)  Resp: (!) 29 (23)  BP: 137/81 (23)  SpO2: 99 % (23) Vital Signs (24h Range):  Temp:  [98.1 °F (36.7 °C)-98.2 °F (36.8 °C)] 98.1 °F (36.7 °C)  Pulse:  [103-118] 103  Resp:  [25-34] 29  SpO2:  [85 %-100 %] 99 %  BP: (131-143)/(75-83) 137/81     Weight: 68 kg (150 lb)  Body mass index is 22.81 kg/m².     Physical Exam  Vitals and nursing note reviewed.   Constitutional:       General: He is in acute distress.      Appearance: He is underweight. He is ill-appearing.   HENT:      Head: Normocephalic and atraumatic.      Nose: Nose normal.      Mouth/Throat:      Mouth: Mucous membranes are moist.   Eyes:      Extraocular Movements: Extraocular  movements intact.   Cardiovascular:      Rate and Rhythm: Normal rate.      Pulses: Normal pulses.      Heart sounds: No murmur heard.  Pulmonary:      Effort: Respiratory distress present.      Breath sounds: Rhonchi present.      Comments: Trach in place, connected to BiPAP.   Abdominal:      General: Abdomen is flat.      Palpations: Abdomen is soft.      Tenderness: There is no abdominal tenderness.      Comments: PEG tube in place   Musculoskeletal:      Right lower leg: No edema.      Left lower leg: No edema.   Skin:     General: Skin is warm.      Capillary Refill: Capillary refill takes less than 2 seconds.   Neurological:      General: No focal deficit present.      Mental Status: He is alert.                Significant Labs: All pertinent labs within the past 24 hours have been reviewed.    Significant Imaging: I have reviewed all pertinent imaging results/findings within the past 24 hours.

## 2023-11-21 NOTE — PLAN OF CARE
PT now on trach collar. Large amounts of thick secretions and pt with productive cough. Suction provided. PEG in place and flushed with meds. Spouse at bedside and updated on plan of care. Voiding in condom cath. No falls, injuries, or skin breakdown.       Problem: Adult Inpatient Plan of Care  Goal: Plan of Care Review  Outcome: Ongoing, Progressing  Goal: Patient-Specific Goal (Individualized)  Outcome: Ongoing, Progressing  Goal: Absence of Hospital-Acquired Illness or Injury  Outcome: Ongoing, Progressing  Goal: Optimal Comfort and Wellbeing  Outcome: Ongoing, Progressing  Goal: Readiness for Transition of Care  Outcome: Ongoing, Progressing     Problem: Skin Injury Risk Increased  Goal: Skin Health and Integrity  Outcome: Ongoing, Progressing

## 2023-11-21 NOTE — ASSESSMENT & PLAN NOTE
Patient's COPD is with exacerbation noted by continued dyspnea and use of accessory muscles for breathing currently.  Patient is currently off COPD Pathway (ICU admit). Continue scheduled inhalers Steroids, Antibiotics, and Supplemental oxygen, scheduled nebs and monitor respiratory status closely.   - On AVAPS  - Pulmonary following  - trach aspirate culture pending  - continue current plan for now

## 2023-11-21 NOTE — ASSESSMENT & PLAN NOTE
Patient's COPD is with exacerbation noted by continued dyspnea and use of accessory muscles for breathing currently.  Patient is currently off COPD Pathway (ICU admit). Continue scheduled inhalers Steroids, Antibiotics, and Supplemental oxygen, scheduled nebs and monitor respiratory status closely.

## 2023-11-21 NOTE — CONSULTS
[facility] - Intensive Care  Pulmonology  Consult Note    Patient Name: Fareed Richard Jr.  MRN: [id number]  Admission Date: 11/20/2023  Hospital Length of Stay: 1 days  Code Status: Full Code  Attending Physician: Caprice Nava MD  Primary Care Provider: Kodi Tubbs MD   Principal Problem: Acute on chronic respiratory failure with hypoxia    Inpatient consult to Pulmonology  Consult performed by: Alicia Schofield NP  Consult ordered by: David Jimenez MD        Subjective:     HPI:  Mr. 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 He reports worsening secretion from his tracheostomy and wheezing.      In the ED, the patient was tachycardic (110), tachypneic and hypoxic (SpO2:  85%), and was placed on AVAPS.  Labs were remarkable for an elevated BNP (1708), elevated troponin (0.030) and a lactic acid of 2.9.  VBG showed a pH of 7.36, pCO2 of 60.3, HC03 of 35.8. Chest x-ray with increased interstitial markings and atelectasis vs airspace disease at the lung bases. He was admitted to the ICU and pulmonary was consulted for respiratory failure.      Past Medical History:   Diagnosis Date    Cancer     skin to Rt forearm and face    COPD (chronic obstructive pulmonary disease)     Hyperlipidemia     Hypertension     Pseudoaneurysm        Past Surgical History:   Procedure Laterality Date    ABDOMINAL SURGERY      stents placed in liver and large intestines, per patient    ANGIOGRAPHY OF AORTIC ARCH  3/27/2023    Procedure: Aortogram, Aortic Arch;  Surgeon: Tim Bhatt MD;  Location: [facility] CATH LAB;  Service: Cardiology;;    AORTOGRAPHY WITH SERIALOGRAPHY N/A 3/27/2023    Procedure: AORTOGRAM, WITH SERIALOGRAPHY;  Surgeon: Tim Bhatt MD;  Location: [facility] CATH LAB;  Service: Cardiology;  Laterality: N/A;    CAROTID STENT       COLONOSCOPY N/A 09/27/2022    Procedure: COLONOSCOPY;  Surgeon: Donnie Peterson MD;  Location: [facility] ENDO (2ND FLR);  Service: Endoscopy;  Laterality: N/A;    COLONOSCOPY N/A 09/30/2022    Procedure: COLONOSCOPY;  Surgeon: Joy Cabrera MD;  Location: [facility] ENDO (2ND FLR);  Service: Endoscopy;  Laterality: N/A;    CORONARY STENT PLACEMENT  01/2000    DISSECTION OF NECK Bilateral 01/04/2022    Procedure: DISSECTION, NECK;  Surgeon: Naeem Smalls MD;  Location: [facility] OR 2ND FLR;  Service: ENT;  Laterality: Bilateral;    ESOPHAGOGASTRODUODENOSCOPY N/A 09/27/2022    Procedure: EGD (ESOPHAGOGASTRODUODENOSCOPY);  Surgeon: Donnie Peterson MD;  Location: [facility] ENDO (2ND FLR);  Service: Endoscopy;  Laterality: N/A;    ESOPHAGOGASTRODUODENOSCOPY N/A 09/30/2022    Procedure: EGD (ESOPHAGOGASTRODUODENOSCOPY);  Surgeon: Joy Cabrera MD;  Location: [facility] ENDO (2ND FLR);  Service: Endoscopy;  Laterality: N/A;    ESOPHAGOGASTRODUODENOSCOPY W/ PEG N/A 01/04/2022    Procedure: EGD, WITH PEG TUBE INSERTION;  Surgeon: Anthony Calabrese MD;  [facility];  Service: General;  Laterality: N/A;    EYE SURGERY      Cataract bilateral    femoral stents      bilateral    FLAP PROCEDURE N/A 01/03/2022    Procedure: CREATION, FREE FLAP;  Surgeon: Naeem Smalls MD;  Location: [facility] OR [facility] FLR;  Service: ENT;  Laterality: N/A;    FLAP PROCEDURE Right 01/04/2022    Procedure: CREATION, FREE FLAP;  Surgeon: Stacey Conde MD;  Location: [facility] OR [facility] FLR;  Service: ENT;  Laterality: Right;  Ischemic start 1203  Ischemic stop 1353    GLOSSECTOMY N/A 01/04/2022    Procedure: GLOSSECTOMY;  Surgeon: Naeem Smalls MD;  [facility];  Service: ENT;  Laterality: N/A;    LEFT HEART CATHETERIZATION Left 3/23/2023    Procedure: Left heart cath;  Surgeon: Tacos Benitez MD;  Location: [facility] CATH LAB;  Service: Cardiology;  Laterality: Left;    PERCUTANEOUS TRANSLUMINAL BALLOON ANGIOPLASTY OF CORONARY ARTERY Left 3/27/2023     Procedure: Angioplasty-coronary;  Surgeon: Tim Bhatt MD;  Location: [facility] CATH LAB;  Service: Cardiology;  Laterality: Left;  not before 9am, R rad access, 6 Fr EBU 3.5 guide    RADICAL NECK DISSECTION Left 2022    Procedure: DISSECTION, NECK, RADICAL;  Surgeon: Naeem Smalls MD;  Location: [facility] OR [address] [place] [address];  Service: ENT;  Laterality: Left;    SKIN BIOPSY      SKIN CANCER EXCISION      STENT, CORONARY, MULTI VESSEL  3/27/2023    Procedure: Stent, Coronary, Multi Vessel;  Surgeon: Tim Bhatt MD;  Location: [facility] CATH LAB;  Service: Cardiology;;    TRACHEOTOMY N/A 2022    Procedure: TRACHEOTOMY;  Surgeon: Naeem Smalls MD;  [facility];  Service: ENT;  Laterality: N/A;    VASCULAR SURGERY         Review of patient's allergies indicates:   Allergen Reactions    Ativan [lorazepam] Anxiety       Family History    None       Tobacco Use    Smoking status: Former     Current packs/day: 0.00     Average packs/day: 2.0 packs/day for 55.0 years (110.0 ttl pk-yrs)     Types: Cigarettes     Start date: 1963     Quit date: 2018     Years since quittin.6    Smokeless tobacco: Never    Tobacco comments:     3/3 ppd x 40 yrs. Currently 3-4 cigarettes daily .He is trying  to quit. Is using a Vapor cigarettes  2-3 x's a day.   Substance and Sexual Activity    Alcohol use: Not Currently    Drug use: No    Sexual activity: Not Currently         Review of Systems   Reason unable to perform ROS: trach.     Objective:     Vital Signs (Most Recent):  Temp: 96.9 °F (36.1 °C) (23 0800)  Pulse: 89 (23 1000)  Resp: 19 (23 1000)  BP: (!) 151/93 (23 1000)  SpO2: 96 % (23 1000) Vital Signs (24h Range):  Temp:  [96.9 °F (36.1 °C)-98.2 °F (36.8 °C)] 96.9 °F (36.1 °C)  Pulse:  [] 89  Resp:  [12-35] 19  SpO2:  [85 %-100 %] 96 %  BP: (114-151)/(68-93) 151/93     Weight: 59.1 kg (130 lb 4.7 oz)  Body mass index is 19.81  kg/m².      Intake/Output Summary (Last 24 hours) at 11/21/2023 1020  Last data filed at 11/21/2023 0934  Gross per 24 hour   Intake 333.68 ml   Output 525 ml   Net -191.32 ml        Physical Exam  Vitals and nursing note reviewed.   Constitutional:       General: He is not in acute distress.     Appearance: He is ill-appearing. He is not toxic-appearing.   HENT:      Nose:      Comments: Abrasion on nose - present on admission  Eyes:      Conjunctiva/sclera: Conjunctivae normal.   Neck:      Comments: Size 6 Uncuffed Shiley tracheostomy tube in place   Cardiovascular:      Rate and Rhythm: Normal rate and regular rhythm.      Pulses: Normal pulses.      Heart sounds: Normal heart sounds.   Pulmonary:      Effort: Pulmonary effort is normal. No respiratory distress.      Breath sounds: Normal breath sounds. No wheezing, rhonchi or rales.      Comments: On AVAPS  Abdominal:      General: Bowel sounds are normal. There is no distension.      Palpations: Abdomen is soft.      Tenderness: There is no abdominal tenderness. There is no guarding.      Comments: PEG in place   Musculoskeletal:      Right lower leg: No edema.      Left lower leg: No edema.   Skin:     General: Skin is warm and dry.   Neurological:      Mental Status: He is alert. Mental status is at baseline.      Comments: Awake and alert, follows commands         Vents:  Oxygen Concentration (%): 40 (weaned from 55% sats were 100%) (11/21/23 0746)    Lines/Drains/Airways       Drain  Duration                  Gastrostomy/Enterostomy 01/04/22 Percutaneous endoscopic gastrostomy (PEG)  days              Airway  Duration             Adult Surgical Airway 03/16/23 1014 Shiley Cuffed 6.0 250 days              Peripheral Intravenous Line  Duration                  Peripheral IV - Single Lumen 11/20/23 20 G Anterior;Distal;Left Upper Arm 1 day         Peripheral IV - Single Lumen 11/20/23 1718 20 G Anterior;Right Forearm <1 day                     Significant Labs:    CBC/Anemia Profile:  Recent Labs   Lab 11/20/23  1606   WBC 7.66   HGB 10.4*   HCT 34.3*      MCV 93   RDW 13.9        Chemistries:  Recent Labs   Lab 11/20/23  1606      K 4.3   CL 94*   CO2 34*   BUN 32*   CREATININE 0.8   CALCIUM 9.5   ALBUMIN 3.1*   PROT 7.7   BILITOT 0.2   ALKPHOS 115   ALT 17   AST 17       All pertinent labs within the past 24 hours have been reviewed.    Significant Imaging:   I have reviewed all pertinent imaging results/findings within the past 24 hours.    ABG  Recent Labs   Lab 11/20/23  1639   PH 7.382   PO2 26*   PCO2 60.3*   HCO3 35.8*   BE 9*     Assessment/Plan:     Pulmonary  * Acute on chronic respiratory failure with hypoxia  Acute on chronic hypoxic respiratory failure is likely due to pneumonia vs COPD exacerbation +/- volume overload. 6.0 cuffless Shiley in place, typically on 5L trach collar. Increased sputum noted over the past several days. Know diastolic dysfunction with elevated BNP.     - Currently on AVAPS with significant improvement. Wean to trach collar as able. If worsening respiratory distress develops, we are available to change out trach for cuffed Shiley, but this is not necessary at this time.   - Agree with broad spectrum antibiotic coverage given the various different respiratory pathogens isolated in the past.  - Diurese as tolerated  - continue steroids and bronchodilators      COPD exacerbation  See respiratory failure     Endocrine  Severe protein-calorie malnutrition  Resume tube feeds via peg.       Plan discussed with Dr. Meza and Dr. Nava.    Critical Care Time: 55 minutes  Critical care was time spent personally by me on the following activities: development of treatment plan with patient or surrogate and bedside caregivers, discussions with consultants, evaluation of patient's response to treatment, examination of patient, ordering and performing treatments and interventions, ordering and review of  laboratory studies, ordering and review of radiographic studies, pulse oximetry, re-evaluation of patient's condition. This critical care time did not overlap with that of any other provider or involve time for any procedures.      Thank you for your consult. I will follow-up with patient. Please contact us if you have any additional questions.     Alicia Schofield NP  Pulmonology  [facility] - Intensive Care

## 2023-11-21 NOTE — ASSESSMENT & PLAN NOTE
Results for orders placed during the hospital encounter of 03/18/23    Echo    Interpretation Summary  · The left ventricle is normal in size with concentric hypertrophy and normal systolic function.  · The estimated ejection fraction is 60%. Inferobasal hypokinesis  · Indeterminate left ventricular diastolic function.  · Normal right ventricular size with normal right ventricular systolic function.  · Mild left atrial enlargement.  · Mild right atrial enlargement.  · Normal central venous pressure (3 mmHg).  · The estimated PA systolic pressure is 13 mmHg.  · The sinuses of Valsalva is mildly dilated.  BNP  Recent Labs   Lab 11/20/23  1606   BNP 1,708*       Patient does not appear to be volume overloaded on exam  - lasix 40mg IV x1 today, monitor UOP

## 2023-11-21 NOTE — ASSESSMENT & PLAN NOTE
Nutrition consulted. Most recent weight and BMI monitored-     Measurements:  Wt Readings from Last 1 Encounters:   11/21/23 59.1 kg (130 lb 4.7 oz)   Body mass index is 19.81 kg/m².    Patient has been screened and assessed by RD.  - resume tube feeding today

## 2023-11-21 NOTE — HPI
This is a 73-year-old male with a past medical history of squamous cell carcinoma of the tongue s/p tracheostomy and PEG, CAD, COPD, hyperlipidemia, anxiety who presents with shortness of breath.     Patient presents with worsening shortness of breath that started 3 days prior to presentation.  He additionally reports worsening secretion from his tracheostomy.  Additional symptoms include wheezing.    In the ED, the patient was tachycardic (110), tachypneic and hypoxic (SpO2:  85%), and was placed on BiPAP.  Labs were remarkable for an elevated BNP (1708), elevated troponin (0.030).  VBG showed a pH of 7.36, pCO2 of 60.3, HC03 of 35.8.  Chest x-ray showed no significant change from prior studies.  Patient was given Lasix 40 mg IV, Solu-Medrol 80 mg IV, vancomycin and Zosyn.  He was admitted for further management.

## 2023-11-21 NOTE — ED NOTES
Pt resting in bed with no new complaints at this time; awaiting hospital room assignment; care ongoing

## 2023-11-21 NOTE — CONSULTS
"  [facility] - Intensive Care  Adult Nutrition  Consult Note    SUMMARY     Recommendations    Recommendation/Intervention: 1. Consider TF sosource 1.5. Continue to advance TF to GR 40 mLhr as tolerated. 2. Continue to monitor NPO status. 3. Monitor weights and labs. 4. Collaboration with medical providers  Goals: 1. Pt to meet % EEN/EPN by RD follow up  Nutrition Goal Status: new  Communication of RD Recs:  (POC)    Assessment and Plan    Nutrition Problem  Decreased oral intake    Related to (etiology):   Diagnosis related symptoms    Signs and Symptoms (as evidenced by):   NPO status  'Tube Feeding    Interventions/Recommendations (treatment strategy):  Collaboration with medical providers    Nutrition Diagnosis Status:   New     Reason for Assessment    Reason For Assessment: consult  Diagnosis:  (acute on chronic respiratory failure with hypoxia)  Relevant Medical History:   Past Medical History:   Diagnosis Date    Cancer     skin to Rt forearm and face    COPD (chronic obstructive pulmonary disease)     Hyperlipidemia     Hypertension     Pseudoaneurysm       Interdisciplinary Rounds: did not attend  General Information Comments: RD consult for TF recs. Pt currently NPO. Pt has no recent weight changes. Consider TF recs of :TF isosource 1.5. Continue to advance TF to GR 40 mLhr as tolerated to meet 100% EEN/EPN. LBM 11/20/23  NKFA. NFPE to be performed at follow ups as warranted.   Nutrition Discharge Planning: pending medical course    Nutrition Risk Screen    Nutrition Risk Screen: tube feeding or parenteral nutrition    Nutrition/Diet History    Food Allergies: NKFA    Anthropometrics    Temp: 97.8 °F (36.6 °C)  Height: 5' 8" (172.7 cm)  Height (inches): 68 in  Weight Method: Bed Scale  Weight: 59 kg (130 lb)  Weight (lb): 130 lb  Ideal Body Weight (IBW), Male: 154 lb  % Ideal Body Weight, Male (lb): 84.42 %  BMI (Calculated): 19.8  BMI Grade: 18.5-24.9 - normal       Lab/Procedures/Meds    Pertinent " Labs Reviewed: reviewed  BMP  Lab Results   Component Value Date     2023    K 4.3 2023    CL 94 (L) 2023    CO2 34 (H) 2023    BUN 32 (H) 2023    CREATININE 0.8 2023    CALCIUM 9.5 2023    ANIONGAP 10 2023    EGFRNORACEVR >60 2023      Pertinent Medications Reviewed: reviewed  Current Outpatient Medications   Medication Instructions    albuterol-ipratropium (DUO-NEB) 2.5 mg-0.5 mg/3 mL nebulizer solution 3 mLs, Nebulization, Every 4 hours PRN, Rescue    ascorbic acid (vitamin C) (VITAMIN C) 100 mg, Oral, Daily    aspirin 81 mg, Oral, Daily    atorvastatin (LIPITOR) 40 mg, Per G Tube, Nightly    bisacodyL (DULCOLAX) 10 mg, Rectal, Daily PRN    clopidogreL (PLAVIX) 75 mg, Oral, Daily    ferrous sulfate 325 mg, Oral, 3 times daily with meals    FLUoxetine (PROZAC) 20 mg, Oral, Daily    folic acid (FOLVITE) 1 mg, Oral, Daily    furosemide (LASIX) 20 mg, Oral, Daily    glycopyrrolate (CUVPOSA) 1 mg/5 mL (0.2 mg/mL) Soln Take 5 mLs (1 mg total) by mouth 3 (three) times daily as needed (TO REDUCE SECRETIONS).    hydrOXYzine HCL (ATARAX) 25 MG tablet SMARTSI.5 Tablet(s) Gastro Tube Every 8 Hours PRN    melatonin (MELATIN) 3 mg, Per G Tube, Nightly    metoprolol tartrate (LOPRESSOR) 12.5 mg, Oral, 2 times daily    omeprazole (PRILOSEC) 40 MG capsule Take 40 mg (contents of one capsule) twice daily through PEG tube. Mix contents of capsule with 10 mL applesauce.    polyethylene glycol (GLYCOLAX) 17 g, Per G Tube, 2 times daily    sodium chloride 3% 3 % nebulizer solution 4 mLs, Nebulization, 2 times daily    thiamine (VITAMIN B-1) 50 mg, Oral, Daily    WIXELA INHUB 250-50 mcg/dose diskus inhaler SMARTSI Puff(s) By Mouth Every 12 Hours        Estimated/Assessed Needs    Weight Used For Calorie Calculations: 59 kg (130 lb)  Energy Calorie Requirements (kcal): 1695 kcal  Energy Need Method: [place] (x 1.3)  Protein Requirements: 59 g  Weight Used For  Protein Calculations: 59 kg (130 lb)     Estimated Fluid Requirement Method: RDA Method  RDA Method (mL): 1695         Nutrition Prescription Ordered    Current Diet Order: NPO    Evaluation of Received Nutrient/Fluid Intake    I/O: -56 mL  Energy Calories Required: not meeting needs  Protein Required: not meeting needs  Fluid Required: not meeting needs  Comments: lbm 11/20/23  % Intake of Estimated Energy Needs: 0 - 25 %  % Meal Intake: NPO    Nutrition Risk    Level of Risk/Frequency of Follow-up: moderate       Monitor and Evaluation    Food and Nutrient Intake: food and beverage intake, enteral nutrition intake  Food and Nutrient Adminstration: diet order, enteral and parenteral nutrition administration  Knowledge/Beliefs/Attitudes: beliefs and attitudes, food and nutrition knowledge/skill  Physical Activity and Function: nutrition-related ADLs and IADLs, factors affecting access to physical activity  Anthropometric Measurements: weight, weight change, body mass index  Biochemical Data, Medical Tests and Procedures: gastrointestinal profile  Nutrition-Focused Physical Findings: overall appearance       Nutrition Follow-Up    RD Follow-up?: Yes    Ambar Dutta, Registration Eligible, Provisional LDN

## 2023-11-21 NOTE — PROGRESS NOTES
"Pharmacokinetic Initial Assessment: IV Vancomycin    Assessment/Plan:    Initiate intravenous vancomycin with loading dose of 1250 mg once followed by a maintenance dose of vancomycin 1500mg IV every 24 hours  Desired empiric serum trough concentration is 10 to 20 mcg/mL  Draw vancomycin trough level 60 min prior to third dose on 11/22 at approximately 1700  Pharmacy will continue to follow and monitor vancomycin.      Please contact pharmacy at extension [phone] with any questions regarding this assessment.     Thank you for the consult,   Erin Burgos       Patient brief summary:  Fareed Richard Jr. is a 73 y.o. male initiated on antimicrobial therapy with IV Vancomycin for treatment of suspected  Pneumonia    Drug Allergies:   Review of patient's allergies indicates:   Allergen Reactions    Ativan [lorazepam] Anxiety       Actual Body Weight:   59.1 kg    Renal Function:   Estimated Creatinine Clearance: 67.7 mL/min (based on SCr of 0.8 mg/dL).,     Dialysis Method (if applicable):  N/A    CBC (last 72 hours):  Recent Labs   Lab Result Units 11/20/23  1606   WBC K/uL 7.66   Hemoglobin g/dL 10.4*   Hematocrit % 34.3*   Platelets K/uL 226   Gran % % 77.7*   Lymph % % 8.9*   Mono % % 11.1   Eosinophil % % 1.7   Basophil % % 0.3   Differential Method  Automated       Metabolic Panel (last 72 hours):  Recent Labs   Lab Result Units 11/20/23  1606   Sodium mmol/L 138   Potassium mmol/L 4.3   Chloride mmol/L 94*   CO2 mmol/L 34*   Glucose mg/dL 181*   BUN mg/dL 32*   Creatinine mg/dL 0.8   Albumin g/dL 3.1*   Total Bilirubin mg/dL 0.2   Alkaline Phosphatase U/L 115   AST U/L 17   ALT U/L 17       Drug levels (last 3 results):  No results for input(s): "VANCOMYCINRA", "VANCORANDOM", "VANCOMYCINPE", "VANCOPEAK", "VANCOMYCINTR", "VANCOTROUGH" in the last 72 hours.    Microbiologic Results:  Microbiology Results (last 7 days)       Procedure Component Value Units Date/Time    Culture, Respiratory with Gram Stain [[id number]] " Collected: 11/21/23 0813    Order Status: Completed Specimen: Respiratory from Tracheal Aspirate Updated: 11/21/23 0852     Gram Stain (Respiratory) <10 epithelial cells per low power field.     Gram Stain (Respiratory) Few WBC's     Gram Stain (Respiratory) Few Gram positive cocci in pairs     Gram Stain (Respiratory) Few Gram positive rods     Gram Stain (Respiratory) Rare Gram negative rods    Blood Culture #1 **CANNOT BE ORDERED STAT** [[id number]] Collected: 11/20/23 1725    Order Status: Completed Specimen: Blood from Peripheral, Forearm, Right Updated: 11/21/23 0312     Blood Culture, Routine No Growth to date    Blood Culture #2 **CANNOT BE ORDERED STAT** [[id number]] Collected: 11/20/23 1730    Order Status: Completed Specimen: Blood from Peripheral, Forearm, Left Updated: 11/21/23 0312     Blood Culture, Routine No Growth to date

## 2023-11-21 NOTE — PROGRESS NOTES
[facility] Medicine  Progress Note    Patient Name: Fareed Richard Jr.  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     Patient presents with worsening shortness of breath that started 3 days prior to presentation.  He additionally reports worsening secretion from his tracheostomy.  Additional symptoms include wheezing.    In the ED, the patient was tachycardic (110), tachypneic and hypoxic (SpO2:  85%), and was placed on BiPAP.  Labs were remarkable for an elevated BNP (1708), elevated troponin (0.030).  VBG showed a pH of 7.36, pCO2 of 60.3, HC03 of 35.8.  Chest x-ray showed no significant change from prior studies.  Patient was given Lasix 40 mg IV, Solu-Medrol 80 mg IV, vancomycin and Zosyn.  He was admitted for further management.    Overview/Hospital Course:  Mr Fareed Richard Jr. Was admitted with acute hypoxic respiratory failure. Concern for PNA. Started BiPAP, antibiotics, and admitted to ICU.     Interval History: Complaining of throat pain.     Review of Systems   HENT:  Positive for sore throat.    Respiratory:  Negative for shortness of breath.    Cardiovascular:  Negative for chest pain.   Gastrointestinal:  Negative for abdominal pain.     Objective:     Vital Signs (Most Recent):  Temp: 96.9 °F (36.1 °C) (11/21/23 0800)  Pulse: 89 (11/21/23 1000)  Resp: 19 (11/21/23 1000)  BP: (!) 151/93 (11/21/23 1000)  SpO2: 96 % (11/21/23 1000) Vital Signs (24h Range):  Temp:  [96.9 °F (36.1 °C)-98.2 °F (36.8 °C)] 96.9 °F (36.1 °C)  Pulse:  []  89  Resp:  [12-35] 19  SpO2:  [85 %-100 %] 96 %  BP: (114-151)/(68-93) 151/93     Weight: 59.1 kg (130 lb 4.7 oz)  Body mass index is 19.81 kg/m².    Intake/Output Summary (Last 24 hours) at 11/21/2023 1019  Last data filed at 11/21/2023 0934  Gross per 24 hour   Intake 333.68 ml   Output 525 ml   Net -191.32 ml         Physical Exam  Vitals and nursing note reviewed.   Constitutional:       General: He is not in acute distress.     Appearance: He is ill-appearing. He is not toxic-appearing.      Comments: Thin man   HENT:      Nose:      Comments: Scab on nose     Mouth/Throat:      Comments: Edentulous. No signs of thrush  Eyes:      Conjunctiva/sclera: Conjunctivae normal.   Neck:      Comments: Cuffless Trach in place  Cardiovascular:      Rate and Rhythm: Normal rate and regular rhythm.      Pulses: Normal pulses.      Heart sounds: Normal heart sounds.   Pulmonary:      Effort: Pulmonary effort is normal. No respiratory distress.      Breath sounds: Normal breath sounds. No wheezing, rhonchi or rales.      Comments: On AVAPS  Abdominal:      General: Bowel sounds are normal. There is no distension.      Palpations: Abdomen is soft.      Tenderness: There is no abdominal tenderness. There is no guarding.      Comments: PEG in place   Musculoskeletal:      Right lower leg: No edema.      Left lower leg: No edema.   Skin:     General: Skin is warm and dry.   Neurological:      Mental Status: He is alert. Mental status is at baseline.      Comments: Awake and alert, trying to speak             Significant Labs: All pertinent labs within the past 24 hours have been reviewed.    Significant Imaging: I have reviewed all pertinent imaging results/findings within the past 24 hours.    Assessment/Plan:      * Acute on chronic respiratory failure with hypoxia  Patient with Hypoxic Respiratory failure which is Acute on chronic.  he is on home oxygen at 6 LPM trach collar. Supplemental oxygen was provided and noted- Oxygen  Concentration (%):  [40-55] 40  Likely in the setting of COPD exacerbation/possible underlying pneumonia.   CXR unremarkable.   - continue COPD treatment   - continue broad spectrum antibiotics. Trach aspirate culture is pending  - BNP elevated, potentially CHF also contributing. Lasix 40mg IV x1 today.   Recent Labs   Lab 11/20/23  1606   BNP 1,708*   - Pulmonary is following    Normocytic anemia  Patient's anemia is currently controlled. Has not received any PRBCs to date. Etiology likely d/t chronic disease due to Malignancy  Current CBC reviewed-   Lab Results   Component Value Date    HGB 10.4 (L) 11/20/2023    HCT 34.3 (L) 11/20/2023     Monitor serial CBC and transfuse if patient becomes hemodynamically unstable, symptomatic or H/H drops below 7/21.    Acute on chronic diastolic heart failure  Results for orders placed during the hospital encounter of 03/18/23    Echo    Interpretation Summary  · The left ventricle is normal in size with concentric hypertrophy and normal systolic function.  · The estimated ejection fraction is 60%. Inferobasal hypokinesis  · Indeterminate left ventricular diastolic function.  · Normal right ventricular size with normal right ventricular systolic function.  · Mild left atrial enlargement.  · Mild right atrial enlargement.  · Normal central venous pressure (3 mmHg).  · The estimated PA systolic pressure is 13 mmHg.  · The sinuses of Valsalva is mildly dilated.  BNP  Recent Labs   Lab 11/20/23  1606   BNP 1,708*       Patient does not appear to be volume overloaded on exam  - lasix 40mg IV x1 today, monitor UOP      PEG (percutaneous endoscopic gastrostomy) status  History noted.     Tracheostomy present  Currently has cuffless Shiley 6.0      ACP (advance care planning)  Advance Care Planning    Date: 11/21/2023  Confirmed full code status            Severe protein-calorie malnutrition  Nutrition consulted. Most recent weight and BMI monitored-     Measurements:  Wt Readings  from Last 1 Encounters:   11/21/23 59.1 kg (130 lb 4.7 oz)   Body mass index is 19.81 kg/m².    Patient has been screened and assessed by RD.  - resume tube feeding today     COPD exacerbation  Patient's COPD is with exacerbation noted by continued dyspnea and use of accessory muscles for breathing currently.  Patient is currently off COPD Pathway (ICU admit). Continue scheduled inhalers Steroids, Antibiotics, and Supplemental oxygen, scheduled nebs and monitor respiratory status closely.   - On AVAPS  - Pulmonary following  - trach aspirate culture pending  - continue current plan for now     Other hyperlipidemia  Continue statin      Primary hypertension  BP is currently well controlled  - continue metoprolol     Coronary artery disease involving native coronary artery of native heart without angina pectoris  No acute issues.  No signs of ACS  - continue asa, statin, plavix     Squamous cell cancer of tongue  History noted. Cancer is not active. Outpatient follow up         VTE Risk Mitigation (From admission, onward)           Ordered     heparin (porcine) injection 5,000 Units  Every 8 hours         11/20/23 1944     IP VTE HIGH RISK PATIENT  Once         11/20/23 1944     Place sequential compression device  Until discontinued         11/20/23 1944                    Discharge Planning   NISA:      Code Status: Full Code   Is the patient medically ready for discharge?:     Reason for patient still in hospital (select all that apply): Patient trending condition               Critical care time spent on the evaluation and treatment of severe organ dysfunction, review of pertinent labs and imaging studies, discussions with consulting providers and discussions with patient/family: 45 minutes.      Caprice Nava MD  Department of Hospital Medicine   [facility] [place] - Intensive Care

## 2023-11-21 NOTE — PROGRESS NOTES
Pt transported to  on trach collar and placed back on BIPAP with previous settings. Tolerated well.

## 2023-11-21 NOTE — ED NOTES
Resp in to see the pt because he says his throat is dry and hurting because of the Bipap; resp tried to wean pt off of Bipap but he is unable to sustain his oxygen sats; care ongoing

## 2023-11-21 NOTE — ASSESSMENT & PLAN NOTE
Patient's anemia is currently controlled. Has not received any PRBCs to date. Etiology likely d/t chronic disease due to Malignancy  Current CBC reviewed-   Lab Results   Component Value Date    HGB 10.4 (L) 11/20/2023    HCT 34.3 (L) 11/20/2023     Monitor serial CBC and transfuse if patient becomes hemodynamically unstable, symptomatic or H/H drops below 7/21.

## 2023-11-21 NOTE — NURSING
[facility] [place]  Nurses Note -- 4 Eyes      11/21/2023       Skin assessed on: Q Shift      [x] No Pressure Injuries Present    [x]Prevention Measures Documented    [] Yes LDA  for Pressure Injury Previously documented     [] Yes New Pressure Injury Discovered   [] LDA for New Pressure Injury Added      Attending RN:  Savannah Ward RN     Second RN:  Linh TRIANA

## 2023-11-21 NOTE — ASSESSMENT & PLAN NOTE
Patient with Hypoxic Respiratory failure which is Acute.  he is not on home oxygen. Supplemental oxygen was provided and noted- Oxygen Concentration (%):  [55] 55  Likely in the setting of COPD exacerbation/possible underlying pneumonia.   CXR unremarkable. Obtain respiratory cultures, continue treatment for pneumonia if positive.

## 2023-11-21 NOTE — ASSESSMENT & PLAN NOTE
Nutrition consulted. Most recent weight and BMI monitored-     Measurements:  Wt Readings from Last 1 Encounters:   11/20/23 68 kg (150 lb)   Body mass index is 22.81 kg/m².    Patient has been screened and assessed by RD.    Malnutrition Type:  Context:    Level:      Malnutrition Characteristic Summary:       Interventions/Recommendations (treatment strategy):

## 2023-11-22 PROBLEM — J15.1 PNEUMONIA DUE TO PSEUDOMONAS SPECIES: Status: ACTIVE | Noted: 2023-11-22

## 2023-11-22 LAB
ALBUMIN SERPL BCP-MCNC: 3.1 G/DL (ref 3.5–5.2)
ALP SERPL-CCNC: 89 U/L (ref 55–135)
ALT SERPL W/O P-5'-P-CCNC: 19 U/L (ref 10–44)
ANION GAP SERPL CALC-SCNC: 7 MMOL/L (ref 8–16)
AST SERPL-CCNC: 19 U/L (ref 10–40)
BASOPHILS # BLD AUTO: 0.01 K/UL (ref 0–0.2)
BASOPHILS NFR BLD: 0.1 % (ref 0–1.9)
BILIRUB SERPL-MCNC: 0.4 MG/DL (ref 0.1–1)
BUN SERPL-MCNC: 40 MG/DL (ref 8–23)
CALCIUM SERPL-MCNC: 9.7 MG/DL (ref 8.7–10.5)
CHLORIDE SERPL-SCNC: 93 MMOL/L (ref 95–110)
CO2 SERPL-SCNC: 37 MMOL/L (ref 23–29)
CREAT SERPL-MCNC: 1.1 MG/DL (ref 0.5–1.4)
DIFFERENTIAL METHOD BLD: ABNORMAL
EOSINOPHIL # BLD AUTO: 0 K/UL (ref 0–0.5)
EOSINOPHIL NFR BLD: 0 % (ref 0–8)
ERYTHROCYTE [DISTWIDTH] IN BLOOD BY AUTOMATED COUNT: 14 % (ref 11.5–14.5)
EST. GFR  (NO RACE VARIABLE): >60 ML/MIN/1.73 M^2
GLUCOSE SERPL-MCNC: 131 MG/DL (ref 70–110)
HCT VFR BLD AUTO: 37 % (ref 40–54)
HGB BLD-MCNC: 11.4 G/DL (ref 14–18)
IMM GRANULOCYTES # BLD AUTO: 0.04 K/UL (ref 0–0.04)
IMM GRANULOCYTES NFR BLD AUTO: 0.4 % (ref 0–0.5)
LYMPHOCYTES # BLD AUTO: 0.4 K/UL (ref 1–4.8)
LYMPHOCYTES NFR BLD: 4 % (ref 18–48)
MCH RBC QN AUTO: 28 PG (ref 27–31)
MCHC RBC AUTO-ENTMCNC: 30.8 G/DL (ref 32–36)
MCV RBC AUTO: 91 FL (ref 82–98)
MONOCYTES # BLD AUTO: 1 K/UL (ref 0.3–1)
MONOCYTES NFR BLD: 9.7 % (ref 4–15)
NEUTROPHILS # BLD AUTO: 8.6 K/UL (ref 1.8–7.7)
NEUTROPHILS NFR BLD: 85.8 % (ref 38–73)
NRBC BLD-RTO: 0 /100 WBC
PLATELET # BLD AUTO: 240 K/UL (ref 150–450)
PMV BLD AUTO: 9.9 FL (ref 9.2–12.9)
POCT GLUCOSE: 120 MG/DL (ref 70–110)
POCT GLUCOSE: 157 MG/DL (ref 70–110)
POTASSIUM SERPL-SCNC: 4 MMOL/L (ref 3.5–5.1)
PROT SERPL-MCNC: 7.5 G/DL (ref 6–8.4)
RBC # BLD AUTO: 4.07 M/UL (ref 4.6–6.2)
SODIUM SERPL-SCNC: 137 MMOL/L (ref 136–145)
VANCOMYCIN TROUGH SERPL-MCNC: 21.5 UG/ML (ref 10–22)
WBC # BLD AUTO: 10.05 K/UL (ref 3.9–12.7)

## 2023-11-22 PROCEDURE — 99233 SBSQ HOSP IP/OBS HIGH 50: CPT | Mod: ,,, | Performed by: NURSE PRACTITIONER

## 2023-11-22 PROCEDURE — 97165 OT EVAL LOW COMPLEX 30 MIN: CPT

## 2023-11-22 PROCEDURE — 99900026 HC AIRWAY MAINTENANCE (STAT)

## 2023-11-22 PROCEDURE — 63600175 PHARM REV CODE 636 W HCPCS: Performed by: STUDENT IN AN ORGANIZED HEALTH CARE EDUCATION/TRAINING PROGRAM

## 2023-11-22 PROCEDURE — 99233 PR SUBSEQUENT HOSPITAL CARE,LEVL III: ICD-10-PCS | Mod: ,,, | Performed by: NURSE PRACTITIONER

## 2023-11-22 PROCEDURE — 99900035 HC TECH TIME PER 15 MIN (STAT)

## 2023-11-22 PROCEDURE — 25000003 PHARM REV CODE 250: Performed by: HOSPITALIST

## 2023-11-22 PROCEDURE — 25000242 PHARM REV CODE 250 ALT 637 W/ HCPCS: Performed by: HOSPITALIST

## 2023-11-22 PROCEDURE — 94761 N-INVAS EAR/PLS OXIMETRY MLT: CPT

## 2023-11-22 PROCEDURE — 63600175 PHARM REV CODE 636 W HCPCS: Performed by: HOSPITALIST

## 2023-11-22 PROCEDURE — 25000003 PHARM REV CODE 250: Performed by: STUDENT IN AN ORGANIZED HEALTH CARE EDUCATION/TRAINING PROGRAM

## 2023-11-22 PROCEDURE — 94640 AIRWAY INHALATION TREATMENT: CPT

## 2023-11-22 PROCEDURE — 25000242 PHARM REV CODE 250 ALT 637 W/ HCPCS: Performed by: STUDENT IN AN ORGANIZED HEALTH CARE EDUCATION/TRAINING PROGRAM

## 2023-11-22 PROCEDURE — 80053 COMPREHEN METABOLIC PANEL: CPT | Performed by: HOSPITALIST

## 2023-11-22 PROCEDURE — 27000221 HC OXYGEN, UP TO 24 HOURS

## 2023-11-22 PROCEDURE — 63600175 PHARM REV CODE 636 W HCPCS: Performed by: NURSE PRACTITIONER

## 2023-11-22 PROCEDURE — 20000000 HC ICU ROOM

## 2023-11-22 PROCEDURE — 80202 ASSAY OF VANCOMYCIN: CPT | Performed by: HOSPITALIST

## 2023-11-22 PROCEDURE — 36415 COLL VENOUS BLD VENIPUNCTURE: CPT | Performed by: HOSPITALIST

## 2023-11-22 PROCEDURE — 85025 COMPLETE CBC W/AUTO DIFF WBC: CPT | Performed by: HOSPITALIST

## 2023-11-22 RX ORDER — PREDNISONE 20 MG/1
40 TABLET ORAL DAILY
Status: COMPLETED | OUTPATIENT
Start: 2023-11-23 | End: 2023-11-25

## 2023-11-22 RX ORDER — FUROSEMIDE 10 MG/ML
40 INJECTION INTRAMUSCULAR; INTRAVENOUS ONCE
Status: COMPLETED | OUTPATIENT
Start: 2023-11-22 | End: 2023-11-22

## 2023-11-22 RX ADMIN — HYDROXYZINE HYDROCHLORIDE 25 MG: 25 TABLET ORAL at 10:11

## 2023-11-22 RX ADMIN — IPRATROPIUM BROMIDE AND ALBUTEROL SULFATE 3 ML: 2.5; .5 SOLUTION RESPIRATORY (INHALATION) at 01:11

## 2023-11-22 RX ADMIN — MUPIROCIN: 20 OINTMENT TOPICAL at 09:11

## 2023-11-22 RX ADMIN — PIPERACILLIN AND TAZOBACTAM 4.5 G: 4; .5 INJECTION, POWDER, LYOPHILIZED, FOR SOLUTION INTRAVENOUS; PARENTERAL at 05:11

## 2023-11-22 RX ADMIN — PIPERACILLIN AND TAZOBACTAM 4.5 G: 4; .5 INJECTION, POWDER, LYOPHILIZED, FOR SOLUTION INTRAVENOUS; PARENTERAL at 03:11

## 2023-11-22 RX ADMIN — METOPROLOL TARTRATE 12.5 MG: 25 TABLET, FILM COATED ORAL at 08:11

## 2023-11-22 RX ADMIN — FUROSEMIDE 40 MG: 10 INJECTION, SOLUTION INTRAVENOUS at 11:11

## 2023-11-22 RX ADMIN — ARFORMOTEROL TARTRATE 15 MCG: 15 SOLUTION RESPIRATORY (INHALATION) at 07:11

## 2023-11-22 RX ADMIN — Medication 50 MG: at 09:11

## 2023-11-22 RX ADMIN — HEPARIN SODIUM 5000 UNITS: 5000 INJECTION INTRAVENOUS; SUBCUTANEOUS at 02:11

## 2023-11-22 RX ADMIN — SODIUM CHLORIDE SOLN NEBU 3% 4 ML: 3 NEBU SOLN at 08:11

## 2023-11-22 RX ADMIN — SODIUM CHLORIDE SOLN NEBU 3% 4 ML: 3 NEBU SOLN at 01:11

## 2023-11-22 RX ADMIN — BUDESONIDE 0.5 MG: 0.5 INHALANT RESPIRATORY (INHALATION) at 08:11

## 2023-11-22 RX ADMIN — FOLIC ACID 1 MG: 1 TABLET ORAL at 08:11

## 2023-11-22 RX ADMIN — DEXTROSE MONOHYDRATE 100 MG: 5 INJECTION INTRAVENOUS at 11:11

## 2023-11-22 RX ADMIN — SODIUM CHLORIDE SOLN NEBU 3% 4 ML: 3 NEBU SOLN at 07:11

## 2023-11-22 RX ADMIN — ASPIRIN 81 MG CHEWABLE TABLET 81 MG: 81 TABLET CHEWABLE at 08:11

## 2023-11-22 RX ADMIN — Medication 6 MG: at 10:11

## 2023-11-22 RX ADMIN — ARFORMOTEROL TARTRATE 15 MCG: 15 SOLUTION RESPIRATORY (INHALATION) at 08:11

## 2023-11-22 RX ADMIN — FAMOTIDINE 20 MG: 10 INJECTION INTRAVENOUS at 08:11

## 2023-11-22 RX ADMIN — BUDESONIDE 0.5 MG: 0.5 INHALANT RESPIRATORY (INHALATION) at 07:11

## 2023-11-22 RX ADMIN — ATORVASTATIN CALCIUM 40 MG: 40 TABLET, FILM COATED ORAL at 08:11

## 2023-11-22 RX ADMIN — IPRATROPIUM BROMIDE AND ALBUTEROL SULFATE 3 ML: 2.5; .5 SOLUTION RESPIRATORY (INHALATION) at 08:11

## 2023-11-22 RX ADMIN — IPRATROPIUM BROMIDE AND ALBUTEROL SULFATE 3 ML: 2.5; .5 SOLUTION RESPIRATORY (INHALATION) at 07:11

## 2023-11-22 RX ADMIN — METHYLPREDNISOLONE SODIUM SUCCINATE 80 MG: 40 INJECTION, POWDER, FOR SOLUTION INTRAMUSCULAR; INTRAVENOUS at 08:11

## 2023-11-22 RX ADMIN — MUPIROCIN: 20 OINTMENT TOPICAL at 08:11

## 2023-11-22 RX ADMIN — HEPARIN SODIUM 5000 UNITS: 5000 INJECTION INTRAVENOUS; SUBCUTANEOUS at 10:11

## 2023-11-22 RX ADMIN — CLOPIDOGREL BISULFATE 75 MG: 75 TABLET ORAL at 09:11

## 2023-11-22 RX ADMIN — PIPERACILLIN AND TAZOBACTAM 4.5 G: 4; .5 INJECTION, POWDER, LYOPHILIZED, FOR SOLUTION INTRAVENOUS; PARENTERAL at 10:11

## 2023-11-22 RX ADMIN — IPRATROPIUM BROMIDE AND ALBUTEROL SULFATE 3 ML: 2.5; .5 SOLUTION RESPIRATORY (INHALATION) at 04:11

## 2023-11-22 RX ADMIN — FLUOXETINE HYDROCHLORIDE 20 MG: 20 SOLUTION ORAL at 09:11

## 2023-11-22 NOTE — SUBJECTIVE & OBJECTIVE
Interval History: Reports feeling much better today. Secretions are more manageable and able to wean trach collar to 8L 35%.       Objective:     Vital Signs (Most Recent):  Temp: 97.1 °F (36.2 °C) (11/22/23 0800)  Pulse: 70 (11/22/23 1000)  Resp: (!) 22 (11/22/23 1000)  BP: 117/69 (11/22/23 1000)  SpO2: 98 % (11/22/23 1000) Vital Signs (24h Range):  Temp:  [97.1 °F (36.2 °C)-98 °F (36.7 °C)] 97.1 °F (36.2 °C)  Pulse:  [] 70  Resp:  [14-32] 22  SpO2:  [78 %-100 %] 98 %  BP: ()/(49-93) 117/69     Weight: 59 kg (130 lb)  Body mass index is 19.77 kg/m².      Intake/Output Summary (Last 24 hours) at 11/22/2023 1049  Last data filed at 11/22/2023 0848  Gross per 24 hour   Intake 737.91 ml   Output 975 ml   Net -237.09 ml        Physical Exam  Vitals and nursing note reviewed.   Constitutional:       General: He is not in acute distress.     Appearance: He is ill-appearing. He is not toxic-appearing.   HENT:      Nose:      Comments: Abrasion on nose - present on admission  Eyes:      Conjunctiva/sclera: Conjunctivae normal.   Neck:      Comments: Size 6 Uncuffed Shiley tracheostomy tube in place   Cardiovascular:      Rate and Rhythm: Normal rate and regular rhythm.      Pulses: Normal pulses.      Heart sounds: Normal heart sounds.   Pulmonary:      Effort: Pulmonary effort is normal. No respiratory distress.      Breath sounds: Normal breath sounds. No wheezing, rhonchi or rales.      Comments: Trach collar   Abdominal:      General: Bowel sounds are normal. There is no distension.      Palpations: Abdomen is soft.      Tenderness: There is no abdominal tenderness. There is no guarding.      Comments: PEG in place   Musculoskeletal:      Right lower leg: No edema.      Left lower leg: No edema.   Skin:     General: Skin is warm and dry.   Neurological:      Mental Status: He is alert. Mental status is at baseline.      Comments: Awake and alert, follows commands            Review of Systems   Respiratory:   Positive for cough.    Cardiovascular:  Negative for chest pain.   Gastrointestinal:  Negative for abdominal pain.       Vents:  Oxygen Concentration (%): (S) 60 (11/22/23 0920)    Lines/Drains/Airways       Drain  Duration                  Gastrostomy/Enterostomy 01/04/22 Percutaneous endoscopic gastrostomy (PEG)  days              Airway  Duration             Adult Surgical Airway 03/16/23 1014 Shiley Cuffed 6.0 251 days              Peripheral Intravenous Line  Duration                  Peripheral IV - Single Lumen 11/20/23 20 G Anterior;Distal;Left Upper Arm 2 days         Peripheral IV - Single Lumen 11/20/23 1718 20 G Anterior;Right Forearm 1 day                    Significant Labs:    CBC/Anemia Profile:  Recent Labs   Lab 11/20/23  1606 11/22/23  0422   WBC 7.66 10.05   HGB 10.4* 11.4*   HCT 34.3* 37.0*    240   MCV 93 91   RDW 13.9 14.0        Chemistries:  Recent Labs   Lab 11/20/23  1606 11/22/23  0422    137   K 4.3 4.0   CL 94* 93*   CO2 34* 37*   BUN 32* 40*   CREATININE 0.8 1.1   CALCIUM 9.5 9.7   ALBUMIN 3.1* 3.1*   PROT 7.7 7.5   BILITOT 0.2 0.4   ALKPHOS 115 89   ALT 17 19   AST 17 19       All pertinent labs within the past 24 hours have been reviewed.    Significant Imaging:  I have reviewed all pertinent imaging results/findings within the past 24 hours.

## 2023-11-22 NOTE — PLAN OF CARE
Problem: Adult Inpatient Plan of Care  Goal: Plan of Care Review  11/22/2023 0502 by Nicole Esposito RN  Outcome: Ongoing, Progressing  11/22/2023 0459 by Nicole Esposito RN  Outcome: Ongoing, Progressing  Goal: Patient-Specific Goal (Individualized)  11/22/2023 0502 by Nicole Esposito RN  Outcome: Ongoing, Progressing  11/22/2023 0459 by Nicole Esposito RN  Outcome: Ongoing, Progressing  Goal: Absence of Hospital-Acquired Illness or Injury  11/22/2023 0502 by Nicole Esposito RN  Outcome: Ongoing, Progressing  11/22/2023 0459 by Nicole Esposito RN  Outcome: Ongoing, Progressing  Goal: Optimal Comfort and Wellbeing  11/22/2023 0502 by Nicole Esposito RN  Outcome: Ongoing, Progressing  11/22/2023 0459 by Nicole Esposito RN  Outcome: Ongoing, Progressing  Goal: Readiness for Transition of Care  11/22/2023 0502 by Nicole Esposito RN  Outcome: Ongoing, Progressing  11/22/2023 0459 by Nicole Esposito RN  Outcome: Ongoing, Progressing

## 2023-11-22 NOTE — PROGRESS NOTES
[facility] Intensive Care  Pulmonology  Progress Note    Patient Name: Fareed Richard Jr.  MRN: [id number]  Admission Date: 11/20/2023  Hospital Length of Stay: 2 days  Code Status: Full Code  Attending Provider: Caprice Nava MD  Primary Care Provider: Kodi Tubbs MD   Principal Problem: Acute on chronic respiratory failure with hypoxia    Subjective:     Interval History: Reports feeling much better today. Secretions are more manageable and able to wean trach collar to 8L 35%.       Objective:     Vital Signs (Most Recent):  Temp: 97.1 °F (36.2 °C) (11/22/23 0800)  Pulse: 70 (11/22/23 1000)  Resp: (!) 22 (11/22/23 1000)  BP: 117/69 (11/22/23 1000)  SpO2: 98 % (11/22/23 1000) Vital Signs (24h Range):  Temp:  [97.1 °F (36.2 °C)-98 °F (36.7 °C)] 97.1 °F (36.2 °C)  Pulse:  [] 70  Resp:  [14-32] 22  SpO2:  [78 %-100 %] 98 %  BP: ()/(49-93) 117/69     Weight: 59 kg (130 lb)  Body mass index is 19.77 kg/m².      Intake/Output Summary (Last 24 hours) at 11/22/2023 1049  Last data filed at 11/22/2023 0848  Gross per 24 hour   Intake 737.91 ml   Output 975 ml   Net -237.09 ml        Physical Exam  Vitals and nursing note reviewed.   Constitutional:       General: He is not in acute distress.     Appearance: He is ill-appearing. He is not toxic-appearing.   HENT:      Nose:      Comments: Abrasion on nose - present on admission  Eyes:      Conjunctiva/sclera: Conjunctivae normal.   Neck:      Comments: Size 6 Uncuffed Shiley tracheostomy tube in place   Cardiovascular:      Rate and Rhythm: Normal rate and regular rhythm.      Pulses: Normal pulses.      Heart sounds: Normal heart sounds.   Pulmonary:      Effort: Pulmonary effort is normal. No respiratory distress.      Breath sounds: Normal breath sounds. No wheezing, rhonchi or rales.      Comments: Trach collar   Abdominal:      General: Bowel sounds are normal. There is no distension.      Palpations: Abdomen is soft.      Tenderness: There is no  abdominal tenderness. There is no guarding.      Comments: PEG in place   Musculoskeletal:      Right lower leg: No edema.      Left lower leg: No edema.   Skin:     General: Skin is warm and dry.   Neurological:      Mental Status: He is alert. Mental status is at baseline.      Comments: Awake and alert, follows commands            Review of Systems   Respiratory:  Positive for cough.    Cardiovascular:  Negative for chest pain.   Gastrointestinal:  Negative for abdominal pain.       Vents:  Oxygen Concentration (%): (S) 60 (11/22/23 0920)    Lines/Drains/Airways       Drain  Duration                  Gastrostomy/Enterostomy 01/04/22 Percutaneous endoscopic gastrostomy (PEG)  days              Airway  Duration             Adult Surgical Airway 03/16/23 1014 Shiley Cuffed 6.0 251 days              Peripheral Intravenous Line  Duration                  Peripheral IV - Single Lumen 11/20/23 20 G Anterior;Distal;Left Upper Arm 2 days         Peripheral IV - Single Lumen 11/20/23 1718 20 G Anterior;Right Forearm 1 day                    Significant Labs:    CBC/Anemia Profile:  Recent Labs   Lab 11/20/23  1606 11/22/23  0422   WBC 7.66 10.05   HGB 10.4* 11.4*   HCT 34.3* 37.0*    240   MCV 93 91   RDW 13.9 14.0        Chemistries:  Recent Labs   Lab 11/20/23  1606 11/22/23  0422    137   K 4.3 4.0   CL 94* 93*   CO2 34* 37*   BUN 32* 40*   CREATININE 0.8 1.1   CALCIUM 9.5 9.7   ALBUMIN 3.1* 3.1*   PROT 7.7 7.5   BILITOT 0.2 0.4   ALKPHOS 115 89   ALT 17 19   AST 17 19       All pertinent labs within the past 24 hours have been reviewed.    Significant Imaging:  I have reviewed all pertinent imaging results/findings within the past 24 hours.    ABG  Recent Labs   Lab 11/20/23  1639   PH 7.382   PO2 26*   PCO2 60.3*   HCO3 35.8*   BE 9*     Assessment/Plan:     Pulmonary  * Acute on chronic respiratory failure with hypoxia  Acute on chronic hypoxic respiratory failure is likely due to pneumonia vs  COPD exacerbation +/- volume overload. 6.0 cuffless Shiley in place, typically on 5L trach collar. Increased sputum noted over the past several days. Know diastolic dysfunction with elevated BNP.     - Required AVAPS initially with significant improvement. Weaned to trach collar. If worsening respiratory distress develops, we are available to change out trach for cuffed Shiley, but this is not necessary at this time.   - Agree with broad spectrum antibiotic coverage given the various different respiratory pathogens isolated in the past. Presumptive pseudomonas on respiratory culture; deescalate accordingly when finalized.   - Diurese as tolerated  - continue steroids and bronchodilators      COPD exacerbation  See respiratory failure     Endocrine  Severe protein-calorie malnutrition  Resume tube feeds via peg.       Plan discussed with Dr. Meza and Dr. Nava.    I spent >35 minutes reviewing patient records, examining, and counseling the patient with greater than 50% of the time spent with direct patient care and coordination.     Alicia Schofield NP  Pulmonology  [facility] - Intensive Care

## 2023-11-22 NOTE — ASSESSMENT & PLAN NOTE
Acute on chronic hypoxic respiratory failure is likely due to pneumonia vs COPD exacerbation +/- volume overload. 6.0 cuffless Shiley in place, typically on 5L trach collar. Increased sputum noted over the past several days. Know diastolic dysfunction with elevated BNP.     - Required AVAPS initially with significant improvement. Weaned to trach collar. If worsening respiratory distress develops, we are available to change out trach for cuffed Shiley, but this is not necessary at this time.   - Agree with broad spectrum antibiotic coverage given the various different respiratory pathogens isolated in the past. Presumptive pseudomonas on respiratory culture; deescalate accordingly when finalized.   - Diurese as tolerated  - continue steroids and bronchodilators

## 2023-11-22 NOTE — ASSESSMENT & PLAN NOTE
Nutrition consulted. Most recent weight and BMI monitored-     Measurements:  Wt Readings from Last 1 Encounters:   11/21/23 59 kg (130 lb)   Body mass index is 19.77 kg/m².    Patient has been screened and assessed by RD.  - resumed tube feeding

## 2023-11-22 NOTE — PROGRESS NOTES
[facility] Medicine  Progress Note    Patient Name: Fareed Richard Jr.  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     Patient presents with worsening shortness of breath that started 3 days prior to presentation.  He additionally reports worsening secretion from his tracheostomy.  Additional symptoms include wheezing.    In the ED, the patient was tachycardic (110), tachypneic and hypoxic (SpO2:  85%), and was placed on BiPAP.  Labs were remarkable for an elevated BNP (1708), elevated troponin (0.030).  VBG showed a pH of 7.36, pCO2 of 60.3, HC03 of 35.8.  Chest x-ray showed no significant change from prior studies.  Patient was given Lasix 40 mg IV, Solu-Medrol 80 mg IV, vancomycin and Zosyn.  He was admitted for further management.    Overview/Hospital Course:  Mr Fareed Richard Jr. Was admitted with acute hypoxic respiratory failure secondary to Pseudomonas PNA. Started BiPAP, antibiotics, and admitted to ICU. Improving, weaned to trach collar.     Interval History: No complaints today, sitting up watching TV    Review of Systems   HENT:  Negative for sore throat.    Respiratory:  Negative for shortness of breath.    Cardiovascular:  Negative for chest pain.   Gastrointestinal:  Negative for abdominal pain.     Objective:     Vital Signs (Most Recent):  Temp: 97.4 °F (36.3 °C) (11/22/23 1200)  Pulse: 79 (11/22/23 1336)  Resp: (!) 24 (11/22/23 1336)  BP: 121/67 (11/22/23 1200)  SpO2: (!) 92 % (11/22/23 1336) Vital Signs (24h Range):  Temp:  [97.1 °F  (36.2 °C)-98 °F (36.7 °C)] 97.4 °F (36.3 °C)  Pulse:  [] 79  Resp:  [14-32] 24  SpO2:  [78 %-100 %] 92 %  BP: ()/(49-93) 121/67     Weight: 59 kg (130 lb)  Body mass index is 19.77 kg/m².    Intake/Output Summary (Last 24 hours) at 11/22/2023 1358  Last data filed at 11/22/2023 0848  Gross per 24 hour   Intake 593.03 ml   Output 575 ml   Net 18.03 ml           Physical Exam  Vitals and nursing note reviewed.   Constitutional:       General: He is not in acute distress.     Appearance: He is ill-appearing. He is not toxic-appearing.      Comments: Thin man   HENT:      Nose:      Comments: Scab on nose     Mouth/Throat:      Comments: Edentulous. No signs of thrush  Eyes:      Conjunctiva/sclera: Conjunctivae normal.   Neck:      Comments: Cuffless Trach in place  Cardiovascular:      Rate and Rhythm: Normal rate and regular rhythm.      Pulses: Normal pulses.      Heart sounds: Normal heart sounds.   Pulmonary:      Effort: Pulmonary effort is normal. No respiratory distress.      Breath sounds: Normal breath sounds. No wheezing, rhonchi or rales.      Comments: On trach collar  Abdominal:      General: Bowel sounds are normal. There is no distension.      Palpations: Abdomen is soft.      Tenderness: There is no abdominal tenderness. There is no guarding.      Comments: PEG in place   Musculoskeletal:      Right lower leg: No edema.      Left lower leg: No edema.   Skin:     General: Skin is warm and dry.   Neurological:      Mental Status: He is alert. Mental status is at baseline.      Comments: Awake and alert, mouthing words to speak             Significant Labs: All pertinent labs within the past 24 hours have been reviewed.    Significant Imaging: I have reviewed all pertinent imaging results/findings within the past 24 hours.    Assessment/Plan:      * Acute on chronic respiratory failure with hypoxia  Patient with Hypoxic Respiratory failure which is Acute on chronic.  he is on home oxygen at 6  LPM trach collar. Supplemental oxygen was provided and noted-   Likely in the setting of COPD exacerbation/underlying pneumonia.   CXR unremarkable.   - continue COPD treatment   - continue broad spectrum antibiotics. Trach aspirate culture Pseudomonas  - BNP elevated, potentially CHF also contributing. Lasix 40mg IV x1 given, appears euvolemic.   Recent Labs   Lab 11/20/23  1606   BNP 1,708*     - Pulmonary is following    Normocytic anemia  Patient's anemia is currently controlled. Has not received any PRBCs to date. 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    Acute on chronic diastolic heart failure  Results for orders placed during the hospital encounter of 03/18/23    Echo    Interpretation Summary  · The left ventricle is normal in size with concentric hypertrophy and normal systolic function.  · The estimated ejection fraction is 60%. Inferobasal hypokinesis  · Indeterminate left ventricular diastolic function.  · Normal right ventricular size with normal right ventricular systolic function.  · Mild left atrial enlargement.  · Mild right atrial enlargement.  · Normal central venous pressure (3 mmHg).  · The estimated PA systolic pressure is 13 mmHg.  · The sinuses of Valsalva is mildly dilated.  BNP  Recent Labs   Lab 11/20/23  1606   BNP 1,708*       Patient does not appear to be volume overloaded on exam  - lasix 40mg IV x1 given, monitor UOP      PEG (percutaneous endoscopic gastrostomy) status  History noted.     Tracheostomy present  Currently has cuffless Shiley 6.0      ACP (advance care planning)  Advance Care Planning    Date: 11/21/2023  Confirmed full code status           Severe protein-calorie malnutrition  Nutrition consulted. Most recent weight and BMI  monitored-     Measurements:  Wt Readings from Last 1 Encounters:   11/21/23 59 kg (130 lb)   Body mass index is 19.77 kg/m².    Patient has been screened and assessed by RD.  - resumed tube feeding     COPD exacerbation  Patient's COPD is with exacerbation noted by continued dyspnea and use of accessory muscles for breathing currently.  Patient is currently off COPD Pathway (ICU admit). Continue scheduled inhalers Steroids, Antibiotics, and Supplemental oxygen, scheduled nebs and monitor respiratory status closely.   - On AVAPS previously, now on trach collar   - Pulmonary following  - change solumedrol to prednisone to complete 5 days  - trach aspirate culture= Pseudomonas, continue antibiotics     Other hyperlipidemia  Continue statin      Primary hypertension  BP is currently well controlled  - continue metoprolol     Coronary artery disease involving native coronary artery of native heart without angina pectoris  No acute issues.  No signs of ACS  - continue asa, statin, plavix     Squamous cell cancer of tongue  History noted. Cancer is not active. Outpatient follow up         VTE Risk Mitigation (From admission, onward)           Ordered     heparin (porcine) injection 5,000 Units  Every 8 hours         11/20/23 1944     IP VTE HIGH RISK PATIENT  Once         11/20/23 1944     Place sequential compression device  Until discontinued         11/20/23 1944                    Discharge Planning   NISA:      Code Status: Full Code   Is the patient medically ready for discharge?:     Reason for patient still in hospital (select all that apply): Patient trending condition  Discharge Plan A: Home with family            Critical care time spent on the evaluation and treatment of severe organ dysfunction, review of pertinent labs and imaging studies, discussions with consulting providers and discussions with patient/family: 30 minutes.      Caprice Nava MD  Department of Hospital Medicine   [facility] - Intensive  Care

## 2023-11-22 NOTE — ASSESSMENT & PLAN NOTE
Patient with Hypoxic Respiratory failure which is Acute on chronic.  he is on home oxygen at 6 LPM trach collar. Supplemental oxygen was provided and noted-   Likely in the setting of COPD exacerbation/underlying pneumonia.   CXR unremarkable.   - continue COPD treatment   - continue broad spectrum antibiotics. Trach aspirate culture Pseudomonas  - BNP elevated, potentially CHF also contributing. Lasix 40mg IV x1 given, appears euvolemic.   Recent Labs   Lab 11/20/23  1606   BNP 1,708*     - Pulmonary is following

## 2023-11-22 NOTE — ASSESSMENT & PLAN NOTE
Patient's anemia is currently controlled. Has not received any PRBCs to date. 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

## 2023-11-22 NOTE — ASSESSMENT & PLAN NOTE
Results for orders placed during the hospital encounter of 03/18/23    Echo    Interpretation Summary  · The left ventricle is normal in size with concentric hypertrophy and normal systolic function.  · The estimated ejection fraction is 60%. Inferobasal hypokinesis  · Indeterminate left ventricular diastolic function.  · Normal right ventricular size with normal right ventricular systolic function.  · Mild left atrial enlargement.  · Mild right atrial enlargement.  · Normal central venous pressure (3 mmHg).  · The estimated PA systolic pressure is 13 mmHg.  · The sinuses of Valsalva is mildly dilated.  BNP  Recent Labs   Lab 11/20/23  1606   BNP 1,708*       Patient does not appear to be volume overloaded on exam  - lasix 40mg IV x1 given, monitor UOP

## 2023-11-22 NOTE — PLAN OF CARE
Currently 10L, 40% trach collar.  Assisted up to chair at bedside.  Vital signs stable.  Tube feeds started at 10/hr with goal of 40.

## 2023-11-22 NOTE — ASSESSMENT & PLAN NOTE
Patient's COPD is with exacerbation noted by continued dyspnea and use of accessory muscles for breathing currently.  Patient is currently off COPD Pathway (ICU admit). Continue scheduled inhalers Steroids, Antibiotics, and Supplemental oxygen, scheduled nebs and monitor respiratory status closely.   - On AVAPS previously, now on trach collar   - Pulmonary following  - change solumedrol to prednisone to complete 5 days  - trach aspirate culture= Pseudomonas, continue antibiotics

## 2023-11-22 NOTE — PLAN OF CARE
Problem: Occupational Therapy  Goal: Occupational Therapy Goal  Description: Goals to be met by: 12/6/23     Patient will increase functional independence with ADLs by performing:    LE Dressing with Modified Gonzales.  Grooming while standing at sink with Modified Gonzales.  Toileting from toilet with Modified Gonzales for hygiene and clothing management.   Step transfer with Modified Gonzales  Toilet transfer to toilet with Modified Gonzales.  Upper extremity exercise program x15 reps per handout, with independence.  Implementation of PLB and energy conservation strategies into daily routines w/ (I).     Outcome: Ongoing, Progressing

## 2023-11-22 NOTE — PROGRESS NOTES
Pharmacokinetic Assessment Follow Up: IV Vancomycin    Vancomycin serum concentration assessment(s):    The random level was drawn correctly and can be used to guide therapy at this time. The measurement is above the desired definitive target range of 10 to 20 mcg/mL.    Vancomycin Regimen Plan:    Re-dose when the random level is less than 20 mcg/mL, next level to be drawn at 0300 on 11/23/23    Drug levels (last 3 results):  Recent Labs   Lab Result Units 11/21/23  1658 11/22/23  1647   Vancomycin-Trough ug/mL 11.1 21.5       Pharmacy will continue to follow and monitor vancomycin.    Please contact pharmacy at extension [phone] for questions regarding this assessment.    Thank you for the consult,   Neeta Altamirano       Patient brief summary:  Fareed Richard Jr. is a 73 y.o. male initiated on antimicrobial therapy with IV Vancomycin for treatment of lower respiratory infection        Drug Allergies:   Review of patient's allergies indicates:   Allergen Reactions    Ativan [lorazepam] Anxiety       Actual Body Weight:   59 kg     Renal Function:   Estimated Creatinine Clearance: 49.2 mL/min (based on SCr of 1.1 mg/dL).,     Dialysis Method (if applicable):  N/A    CBC (last 72 hours):  Recent Labs   Lab Result Units 11/20/23  1606 11/22/23  0422   WBC K/uL 7.66 10.05   Hemoglobin g/dL 10.4* 11.4*   Hematocrit % 34.3* 37.0*   Platelets K/uL 226 240   Gran % % 77.7* 85.8*   Lymph % % 8.9* 4.0*   Mono % % 11.1 9.7   Eosinophil % % 1.7 0.0   Basophil % % 0.3 0.1   Differential Method  Automated Automated       Metabolic Panel (last 72 hours):  Recent Labs   Lab Result Units 11/20/23  1606 11/22/23  0422   Sodium mmol/L 138 137   Potassium mmol/L 4.3 4.0   Chloride mmol/L 94* 93*   CO2 mmol/L 34* 37*   Glucose mg/dL 181* 131*   BUN mg/dL 32* 40*   Creatinine mg/dL 0.8 1.1   Albumin g/dL 3.1* 3.1*   Total Bilirubin mg/dL 0.2 0.4   Alkaline Phosphatase U/L 115 89   AST U/L 17 19   ALT U/L 17 19       Vancomycin  Administrations:  vancomycin given in the last 96 hours                     vancomycin 1,500 mg in dextrose 5 % (D5W) 250 mL IVPB (Vial-Mate) (mg) 1,500 mg New Bag 11/21/23 1751    vancomycin 1,250 mg in dextrose 5 % (D5W) 250 mL IVPB (Vial-Mate) (mg) 1,250 mg New Bag 11/20/23 1840                    Microbiologic Results:  Microbiology Results (last 7 days)       Procedure Component Value Units Date/Time    Culture, Respiratory with Gram Stain [[id number]]  (Abnormal) Collected: 11/21/23 0813    Order Status: Completed Specimen: Respiratory from Tracheal Aspirate Updated: 11/22/23 1003     Respiratory Culture PRESUMPTIVE PSEUDOMONAS SPECIES  Many  Identification and susceptibility pending       Gram Stain (Respiratory) <10 epithelial cells per low power field.     Gram Stain (Respiratory) Few WBC's     Gram Stain (Respiratory) Few Gram positive cocci in pairs     Gram Stain (Respiratory) Few Gram positive rods     Gram Stain (Respiratory) Rare Gram negative rods    Blood Culture #1 **CANNOT BE ORDERED STAT** [[id number]] Collected: 11/20/23 1725    Order Status: Completed Specimen: Blood from Peripheral, Forearm, Right Updated: 11/21/23 1903     Blood Culture, Routine No Growth to date      No Growth to date    Blood Culture #2 **CANNOT BE ORDERED STAT** [[id number]] Collected: 11/20/23 1730    Order Status: Completed Specimen: Blood from Peripheral, Forearm, Left Updated: 11/21/23 1903     Blood Culture, Routine No Growth to date      No Growth to date

## 2023-11-22 NOTE — SUBJECTIVE & OBJECTIVE
Interval History: No complaints today, sitting up watching TV    Review of Systems   HENT:  Negative for sore throat.    Respiratory:  Negative for shortness of breath.    Cardiovascular:  Negative for chest pain.   Gastrointestinal:  Negative for abdominal pain.     Objective:     Vital Signs (Most Recent):  Temp: 97.4 °F (36.3 °C) (11/22/23 1200)  Pulse: 79 (11/22/23 1336)  Resp: (!) 24 (11/22/23 1336)  BP: 121/67 (11/22/23 1200)  SpO2: (!) 92 % (11/22/23 1336) Vital Signs (24h Range):  Temp:  [97.1 °F (36.2 °C)-98 °F (36.7 °C)] 97.4 °F (36.3 °C)  Pulse:  [] 79  Resp:  [14-32] 24  SpO2:  [78 %-100 %] 92 %  BP: ()/(49-93) 121/67     Weight: 59 kg (130 lb)  Body mass index is 19.77 kg/m².    Intake/Output Summary (Last 24 hours) at 11/22/2023 1358  Last data filed at 11/22/2023 0848  Gross per 24 hour   Intake 593.03 ml   Output 575 ml   Net 18.03 ml           Physical Exam  Vitals and nursing note reviewed.   Constitutional:       General: He is not in acute distress.     Appearance: He is ill-appearing. He is not toxic-appearing.      Comments: Thin man   HENT:      Nose:      Comments: Scab on nose     Mouth/Throat:      Comments: Edentulous. No signs of thrush  Eyes:      Conjunctiva/sclera: Conjunctivae normal.   Neck:      Comments: Cuffless Trach in place  Cardiovascular:      Rate and Rhythm: Normal rate and regular rhythm.      Pulses: Normal pulses.      Heart sounds: Normal heart sounds.   Pulmonary:      Effort: Pulmonary effort is normal. No respiratory distress.      Breath sounds: Normal breath sounds. No wheezing, rhonchi or rales.      Comments: On trach collar  Abdominal:      General: Bowel sounds are normal. There is no distension.      Palpations: Abdomen is soft.      Tenderness: There is no abdominal tenderness. There is no guarding.      Comments: PEG in place   Musculoskeletal:      Right lower leg: No edema.      Left lower leg: No edema.   Skin:     General: Skin is warm and  dry.   Neurological:      Mental Status: He is alert. Mental status is at baseline.      Comments: Awake and alert, mouthing words to speak             Significant Labs: All pertinent labs within the past 24 hours have been reviewed.    Significant Imaging: I have reviewed all pertinent imaging results/findings within the past 24 hours.

## 2023-11-22 NOTE — PT/OT/SLP EVAL
Occupational Therapy   Evaluation    Name: Fareed Richard Jr.  MRN: [id number]  Admitting Diagnosis: Acute on chronic respiratory failure with hypoxia  Recent Surgery: * No surgery found *      Recommendations:     Discharge Recommendations: Low Intensity Therapy (w/ assistance)  Discharge Equipment Recommendations:  to be determined by next level of care  Barriers to discharge:   (Pt in ICU on 10L; usually on 5L at baseline.)    Assessment:     Fareed Richard Jr. is a 73 y.o. male with a medical diagnosis of Acute on chronic respiratory failure with hypoxia.  Performance deficits affecting function: weakness, impaired endurance, impaired functional mobility, impaired self care skills, gait instability, impaired balance, decreased lower extremity function, decreased safety awareness, impaired cardiopulmonary response to activity.      Pt was able to tolerate getting OOB to chair w/ CGA-SBA and no AD, spO2 82-95% with exertion, requiring increased time and rest for recovering to 88%. Pt will continue to benefit from skilled acute OT services to maximize functional capacity for safe performance w/ ADLs and functional mobility.      Rehab Prognosis: Good; patient would benefit from acute skilled OT services to address these deficits and reach maximum level of function.       Plan:     Patient to be seen  (2-3x/wk) to address the above listed problems via self-care/home management, therapeutic activities, therapeutic exercises  Plan of Care Expires: 12/06/23  Plan of Care Reviewed with:  PATIENT    Subjective     Chief Complaint: none   Patient/Family Comments/goals: eager to get OOB to chair     Occupational Profile:  Living Environment: Pt lives with his spouse in a [facility] with 5STE, HR available   Previous level of function: Pt was mod I w/ functional mobility PTA; wife assisted as needed.   Roles and Routines: none stated   Equipment Used at Home: bedside commode, oxygen, hospital bed, walker, rolling, suction machine,  respiratory supplies, feeding device, nutrition supplies, shower chair   Assistance upon Discharge: wife    Pain/Comfort:  Pain Rating 1: 0/10  Pain Rating Post-Intervention 1: 0/10    Patients cultural, spiritual, Religion conflicts given the current situation: no    Objective:     Communicated with: Nurse prior to session.  Patient found HOB elevated with peripheral IV, PEG Tube, Condom Catheter, Tracheostomy, oxygen, pulse ox (continuous), blood pressure cuff, telemetry upon OT entry to room.    General Precautions: Standard, fall, respiratory  Orthopedic Precautions: N/A  Braces: N/A  Respiratory Status: Trach Collar 10L @ 40%    Occupational Performance:    Bed Mobility:    Patient completed Scooting hips to EOB with stand by assistance  Patient completed Supine to Sit with stand by assistance and HOB elevated     Functional Mobility/Transfers:  Patient completed Sit <> Stand Transfer  x 1 trial from EOB with CGA-stand by assistance and contact guard assistance  with  rolling walker   Patient completed Bed <> Chair Transfer using Step Transfer technique with stand by assistance and contact guard assistance with rolling walker  Functional Mobility: Ambulation limited 2* short PEG tube line.     Activities of Daily Living:  Upper Body Dressing: minimum assistance for donning gown over back   Lower Body Dressing: total assistance for donning socks for BLEs     Cognitive/Visual Perceptual:  Cognitive/Psychosocial Skills:     -       Oriented to: Person, Place, Time, and Situation   -       Follows Commands/attention:Follows multistep  commands  -       Communication: pt able to mouth words/short phrases   -       Memory: No Deficits noted  -       Safety awareness/insight to disability: impaired     Physical Exam:  Balance:    -       good sitting balance; fair + standing balance  Postural examination/scapula alignment:    -       Rounded shoulders  Skin integrity: Visible skin intact  Edema:  None  noted  Sensation:    -       Intact  Upper Extremity Range of Motion:     -       Right Upper Extremity: WFL  -       Left Upper Extremity: WFL  Upper Extremity Strength:    -       Right Upper Extremity: WFL  -       Left Upper Extremity: WFL   Strength:    -       Right Upper Extremity: WFL  -       Left Upper Extremity: WFL    AMPAC 6 Click ADL:  AMPAC Total Score: 21    Treatment & Education:  -Initial eval complete.   -Pt educated on safe functional mobility and ADL performance.   -Pt educated on role of OT and POC.     Patient left up in chair with all lines intact and call button in reach    GOALS:   Multidisciplinary Problems       Occupational Therapy Goals          Problem: Occupational Therapy    Goal Priority Disciplines Outcome Interventions   Occupational Therapy Goal     OT, PT/OT Ongoing, Progressing    Description: Goals to be met by: 12/6/23     Patient will increase functional independence with ADLs by performing:    LE Dressing with Modified [place].  Grooming while standing at sink with Modified [place].  Toileting from toilet with Modified [place] for hygiene and clothing management.   Step transfer with Modified [place]  Toilet transfer to toilet with Modified [place].  Upper extremity exercise program x15 reps per handout, with independence.  Implementation of PLB and energy conservation strategies into daily routines w/ (I).                          History:     Past Medical History:   Diagnosis Date    Cancer     skin to Rt forearm and face    COPD (chronic obstructive pulmonary disease)     Hyperlipidemia     Hypertension     Pseudoaneurysm          Past Surgical History:   Procedure Laterality Date    ABDOMINAL SURGERY      stents placed in liver and large intestines, per patient    ANGIOGRAPHY OF AORTIC ARCH  3/27/2023    Procedure: Aortogram, Aortic Arch;  Surgeon: Tim Bhatt MD;  Location: [facility] CATH LAB;  Service: Cardiology;;    AORTOGRAPHY WITH  SERIALOGRAPHY N/A 3/27/2023    Procedure: AORTOGRAM, WITH SERIALOGRAPHY;  Surgeon: Tim Bhatt MD;  Location: [facility] CATH LAB;  Service: Cardiology;  Laterality: N/A;    CAROTID STENT      COLONOSCOPY N/A 09/27/2022    Procedure: COLONOSCOPY;  Surgeon: Donnie Peterson MD;  Location: [facility] ENDO (2ND FLR);  Service: Endoscopy;  Laterality: N/A;    COLONOSCOPY N/A 09/30/2022    Procedure: COLONOSCOPY;  Surgeon: Joy Cabrera MD;  Location: [facility] ENDO (2ND FLR);  Service: Endoscopy;  Laterality: N/A;    CORONARY STENT PLACEMENT  01/2000    DISSECTION OF NECK Bilateral 01/04/2022    Procedure: DISSECTION, NECK;  Surgeon: Naeem Smalls MD;  [facility];  Service: ENT;  Laterality: Bilateral;    ESOPHAGOGASTRODUODENOSCOPY N/A 09/27/2022    Procedure: EGD (ESOPHAGOGASTRODUODENOSCOPY);  Surgeon: Donnie Peterson MD;  Location: [facility] ENDO (2ND FLR);  Service: Endoscopy;  Laterality: N/A;    ESOPHAGOGASTRODUODENOSCOPY N/A 09/30/2022    Procedure: EGD (ESOPHAGOGASTRODUODENOSCOPY);  Surgeon: Joy Cabrera MD;  Location: [facility] ENDO (2ND FLR);  Service: Endoscopy;  Laterality: N/A;    ESOPHAGOGASTRODUODENOSCOPY W/ PEG N/A 01/04/2022    Procedure: EGD, WITH PEG TUBE INSERTION;  Surgeon: Anthony Calabrese MD;  [facility];  Service: General;  Laterality: N/A;    EYE SURGERY      Cataract bilateral    femoral stents      bilateral    FLAP PROCEDURE N/A 01/03/2022    Procedure: CREATION, FREE FLAP;  Surgeon: Naeem Smalls MD;  [facility];  Service: ENT;  Laterality: N/A;    FLAP PROCEDURE Right 01/04/2022    Procedure: CREATION, FREE FLAP;  Surgeon: Stacey Conde MD;  [facility];  Service: ENT;  Laterality: Right;  Ischemic start 1203  Ischemic stop 1353    GLOSSECTOMY N/A 01/04/2022    Procedure: GLOSSECTOMY;  Surgeon: Naeem Smalls MD;  Location: [facility] OR 2ND FLR;  Service: ENT;  Laterality: N/A;    LEFT HEART CATHETERIZATION Left 3/23/2023    Procedure:  Left heart cath;  Surgeon: Tacos Benitez MD;  Location: [facility] CATH LAB;  Service: Cardiology;  Laterality: Left;    PERCUTANEOUS TRANSLUMINAL BALLOON ANGIOPLASTY OF CORONARY ARTERY Left 3/27/2023    Procedure: Angioplasty-coronary;  Surgeon: Tim Bhatt MD;  Location: [facility] CATH LAB;  Service: Cardiology;  Laterality: Left;  not before 9am, R rad access, 6 Fr EBU 3.5 guide    RADICAL NECK DISSECTION Left 01/03/2022    Procedure: DISSECTION, NECK, RADICAL;  Surgeon: Naeem Smalls MD;  Location: [facility] OR [address] [place] [address];  Service: ENT;  Laterality: Left;    SKIN BIOPSY      SKIN CANCER EXCISION      STENT, CORONARY, MULTI VESSEL  3/27/2023    Procedure: Stent, Coronary, Multi Vessel;  Surgeon: Tim Bhatt MD;  Location: [facility] CATH LAB;  Service: Cardiology;;    TRACHEOTOMY N/A 01/04/2022    Procedure: TRACHEOTOMY;  Surgeon: Naeem Smalls MD;  Location: [facility] OR [address] [place] [address];  Service: ENT;  Laterality: N/A;    VASCULAR SURGERY         Time Tracking:     OT Date of Treatment: 11/22/23  OT Start Time: 1452  OT Stop Time: 1508  OT Total Time (min): 16 min    Billable Minutes:Evaluation 16  Total Time 16    11/22/2023

## 2023-11-22 NOTE — NURSING
[facility]  Nurses Note -- 4 Eyes      11/21/2023       Skin assessed on: Q Shift      [x] No Pressure Injuries Present    []Prevention Measures Documented    [] Yes LDA  for Pressure Injury Previously documented     [] Yes New Pressure Injury Discovered   [] LDA for New Pressure Injury Added      Attending RN:  Nicole Esposito RN     Second RN: Savannah Ward RN

## 2023-11-23 LAB
ANION GAP SERPL CALC-SCNC: 16 MMOL/L (ref 8–16)
BACTERIA SPEC AEROBE CULT: ABNORMAL
BUN SERPL-MCNC: 57 MG/DL (ref 8–23)
CALCIUM SERPL-MCNC: 10.2 MG/DL (ref 8.7–10.5)
CHLORIDE SERPL-SCNC: 91 MMOL/L (ref 95–110)
CO2 SERPL-SCNC: 33 MMOL/L (ref 23–29)
CREAT SERPL-MCNC: 1.4 MG/DL (ref 0.5–1.4)
EST. GFR  (NO RACE VARIABLE): 53 ML/MIN/1.73 M^2
GLUCOSE SERPL-MCNC: 156 MG/DL (ref 70–110)
GRAM STN SPEC: ABNORMAL
POCT GLUCOSE: 123 MG/DL (ref 70–110)
POCT GLUCOSE: 158 MG/DL (ref 70–110)
POCT GLUCOSE: 179 MG/DL (ref 70–110)
POCT GLUCOSE: 215 MG/DL (ref 70–110)
POTASSIUM SERPL-SCNC: 3.6 MMOL/L (ref 3.5–5.1)
SODIUM SERPL-SCNC: 140 MMOL/L (ref 136–145)
VANCOMYCIN SERPL-MCNC: 16.7 UG/ML

## 2023-11-23 PROCEDURE — 25000242 PHARM REV CODE 250 ALT 637 W/ HCPCS: Performed by: HOSPITALIST

## 2023-11-23 PROCEDURE — 80048 BASIC METABOLIC PNL TOTAL CA: CPT | Performed by: HOSPITALIST

## 2023-11-23 PROCEDURE — 63600175 PHARM REV CODE 636 W HCPCS: Performed by: HOSPITALIST

## 2023-11-23 PROCEDURE — 97161 PT EVAL LOW COMPLEX 20 MIN: CPT | Performed by: PHYSICAL THERAPIST

## 2023-11-23 PROCEDURE — 94640 AIRWAY INHALATION TREATMENT: CPT

## 2023-11-23 PROCEDURE — 97161 PT EVAL LOW COMPLEX 20 MIN: CPT

## 2023-11-23 PROCEDURE — 25000242 PHARM REV CODE 250 ALT 637 W/ HCPCS: Performed by: STUDENT IN AN ORGANIZED HEALTH CARE EDUCATION/TRAINING PROGRAM

## 2023-11-23 PROCEDURE — 31720 CLEARANCE OF AIRWAYS: CPT

## 2023-11-23 PROCEDURE — 21400001 HC TELEMETRY ROOM

## 2023-11-23 PROCEDURE — 25000003 PHARM REV CODE 250: Performed by: HOSPITALIST

## 2023-11-23 PROCEDURE — 36415 COLL VENOUS BLD VENIPUNCTURE: CPT | Performed by: HOSPITALIST

## 2023-11-23 PROCEDURE — 99233 PR SUBSEQUENT HOSPITAL CARE,LEVL III: ICD-10-PCS | Mod: ,,, | Performed by: INTERNAL MEDICINE

## 2023-11-23 PROCEDURE — 80202 ASSAY OF VANCOMYCIN: CPT | Performed by: HOSPITALIST

## 2023-11-23 PROCEDURE — 63600175 PHARM REV CODE 636 W HCPCS: Performed by: STUDENT IN AN ORGANIZED HEALTH CARE EDUCATION/TRAINING PROGRAM

## 2023-11-23 PROCEDURE — 94761 N-INVAS EAR/PLS OXIMETRY MLT: CPT

## 2023-11-23 PROCEDURE — 99900026 HC AIRWAY MAINTENANCE (STAT)

## 2023-11-23 PROCEDURE — 27000221 HC OXYGEN, UP TO 24 HOURS

## 2023-11-23 PROCEDURE — 25000003 PHARM REV CODE 250: Performed by: STUDENT IN AN ORGANIZED HEALTH CARE EDUCATION/TRAINING PROGRAM

## 2023-11-23 PROCEDURE — 99233 SBSQ HOSP IP/OBS HIGH 50: CPT | Mod: ,,, | Performed by: INTERNAL MEDICINE

## 2023-11-23 PROCEDURE — 99900035 HC TECH TIME PER 15 MIN (STAT)

## 2023-11-23 RX ADMIN — PIPERACILLIN AND TAZOBACTAM 4.5 G: 4; .5 INJECTION, POWDER, LYOPHILIZED, FOR SOLUTION INTRAVENOUS; PARENTERAL at 08:11

## 2023-11-23 RX ADMIN — PIPERACILLIN AND TAZOBACTAM 4.5 G: 4; .5 INJECTION, POWDER, LYOPHILIZED, FOR SOLUTION INTRAVENOUS; PARENTERAL at 10:11

## 2023-11-23 RX ADMIN — ASPIRIN 81 MG CHEWABLE TABLET 81 MG: 81 TABLET CHEWABLE at 09:11

## 2023-11-23 RX ADMIN — METOPROLOL TARTRATE 12.5 MG: 25 TABLET, FILM COATED ORAL at 08:11

## 2023-11-23 RX ADMIN — SODIUM CHLORIDE SOLN NEBU 3% 4 ML: 3 NEBU SOLN at 07:11

## 2023-11-23 RX ADMIN — IPRATROPIUM BROMIDE AND ALBUTEROL SULFATE 3 ML: 2.5; .5 SOLUTION RESPIRATORY (INHALATION) at 01:11

## 2023-11-23 RX ADMIN — Medication 6 MG: at 08:11

## 2023-11-23 RX ADMIN — HEPARIN SODIUM 5000 UNITS: 5000 INJECTION INTRAVENOUS; SUBCUTANEOUS at 06:11

## 2023-11-23 RX ADMIN — FAMOTIDINE 20 MG: 10 INJECTION INTRAVENOUS at 09:11

## 2023-11-23 RX ADMIN — BUDESONIDE 0.5 MG: 0.5 INHALANT RESPIRATORY (INHALATION) at 07:11

## 2023-11-23 RX ADMIN — HEPARIN SODIUM 5000 UNITS: 5000 INJECTION INTRAVENOUS; SUBCUTANEOUS at 09:11

## 2023-11-23 RX ADMIN — MUPIROCIN: 20 OINTMENT TOPICAL at 08:11

## 2023-11-23 RX ADMIN — HEPARIN SODIUM 5000 UNITS: 5000 INJECTION INTRAVENOUS; SUBCUTANEOUS at 02:11

## 2023-11-23 RX ADMIN — FLUOXETINE HYDROCHLORIDE 20 MG: 20 SOLUTION ORAL at 09:11

## 2023-11-23 RX ADMIN — ARFORMOTEROL TARTRATE 15 MCG: 15 SOLUTION RESPIRATORY (INHALATION) at 07:11

## 2023-11-23 RX ADMIN — IPRATROPIUM BROMIDE AND ALBUTEROL SULFATE 3 ML: 2.5; .5 SOLUTION RESPIRATORY (INHALATION) at 07:11

## 2023-11-23 RX ADMIN — FOLIC ACID 1 MG: 1 TABLET ORAL at 09:11

## 2023-11-23 RX ADMIN — PREDNISONE 40 MG: 20 TABLET ORAL at 09:11

## 2023-11-23 RX ADMIN — ATORVASTATIN CALCIUM 40 MG: 40 TABLET, FILM COATED ORAL at 09:11

## 2023-11-23 RX ADMIN — Medication 50 MG: at 09:11

## 2023-11-23 RX ADMIN — PIPERACILLIN AND TAZOBACTAM 4.5 G: 4; .5 INJECTION, POWDER, LYOPHILIZED, FOR SOLUTION INTRAVENOUS; PARENTERAL at 03:11

## 2023-11-23 RX ADMIN — IPRATROPIUM BROMIDE AND ALBUTEROL SULFATE 3 ML: 2.5; .5 SOLUTION RESPIRATORY (INHALATION) at 02:11

## 2023-11-23 RX ADMIN — CLOPIDOGREL BISULFATE 75 MG: 75 TABLET ORAL at 08:11

## 2023-11-23 RX ADMIN — MUPIROCIN: 20 OINTMENT TOPICAL at 09:11

## 2023-11-23 RX ADMIN — METOPROLOL TARTRATE 12.5 MG: 25 TABLET, FILM COATED ORAL at 09:11

## 2023-11-23 NOTE — PROVIDER TRANSFER
Mr Fareed Richard Jr. Is a 73 y.o. 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 Cause= COPD exacerbation and Pseudomonas PNA. Improving. Home O2 is 6L (45% FiO2). He is currently on 10L (50% FiO2)- difference in L flow is due to humidification. On zosyn and prednisone. PT, OT consulted. Need to continue suctioning.     To do  - antibiotics x7 days  - PT, OT consults    Caprice Nava MD  11/23/2023 8:59 AM

## 2023-11-23 NOTE — PLAN OF CARE
Problem: Adult Inpatient Plan of Care  Goal: Plan of Care Review  Flowsheets (Taken 11/23/2023 1742)  Plan of Care Reviewed With: patient  Goal: Absence of Hospital-Acquired Illness or Injury  Intervention: Identify and Manage Fall Risk  Flowsheets (Taken 11/23/2023 1742)  Safety Promotion/Fall Prevention:   assistive device/personal item within reach   bed alarm set   room near unit station  Intervention: Prevent Skin Injury  Flowsheets (Taken 11/23/2023 1742)  Body Position: position maintained  Skin Protection: adhesive use limited  Intervention: Prevent and Manage VTE (Venous Thromboembolism) Risk  Flowsheets (Taken 11/23/2023 1742)  Activity Management: Arm raise - L1  Range of Motion: active ROM (range of motion) encouraged  Goal: Optimal Comfort and Wellbeing  Intervention: Monitor Pain and Promote Comfort  Flowsheets (Taken 11/23/2023 1742)  Pain Management Interventions:   care clustered   pillow support provided   position adjusted     Problem: Skin Injury Risk Increased  Goal: Skin Health and Integrity  Intervention: Optimize Skin Protection  Flowsheets (Taken 11/23/2023 1742)  Pressure Reduction Techniques:   positioned off wounds   pressure points protected   weight shift assistance provided  Pressure Reduction Devices: positioning supports utilized  Skin Protection: adhesive use limited  Head of Bed (HOB) Positioning: HOB elevated  Intervention: Promote and Optimize Oral Intake  Flowsheets (Taken 11/23/2023 1742)  Oral Nutrition Promotion: social interaction promoted

## 2023-11-23 NOTE — NURSING
[facility] [place]  Nurses Note -- 4 Eyes      11/22/2023       Skin assessed on: Q Shift      [x] No Pressure Injuries Present    []Prevention Measures Documented    [] Yes LDA  for Pressure Injury Previously documented     [] Yes New Pressure Injury Discovered   [] LDA for New Pressure Injury Added      Attending RN:  Nicole Esposito RN     Second RN: Belia Groves RN

## 2023-11-23 NOTE — ASSESSMENT & PLAN NOTE
Patient with Hypoxic Respiratory failure which is Acute on chronic.  he is on home oxygen at 6 LPM trach collar. Supplemental oxygen was provided and noted-   Likely in the setting of COPD exacerbation/underlying pneumonia.   CXR unremarkable.   - continue COPD treatment   - continue zosyn x7 days. Trach aspirate culture Pseudomonas  - BNP elevated, potentially CHF also contributing. Lasix 40mg IV x2 given, appears euvolemic.   Recent Labs   Lab 11/20/23  1606   BNP 1,708*     - Pulmonary is following

## 2023-11-23 NOTE — PROGRESS NOTES
Pharmacokinetic Assessment Follow Up: IV Vancomycin    Vancomycin serum concentration assessment(s):    The random level was drawn correctly and can be used to guide therapy at this time. The measurement is within the desired definitive target range of 10 to 20 mcg/mL.    Vancomycin Regimen Plan:    Give Vancomycin 500 mg x1 dose today and obtain random level on 11/24 at 0300    Drug levels (last 3 results):  Recent Labs   Lab Result Units 11/21/23  1658 11/22/23  1647 11/23/23  0428   Vancomycin, Random ug/mL  --   --  16.7   Vancomycin-Trough ug/mL 11.1 21.5  --        Pharmacy will continue to follow and monitor vancomycin.    Please contact pharmacy at extension [phone] for questions regarding this assessment.    Thank you for the consult,   Héctor Altamirano       Patient brief summary:  Fareed Richard Jr. is a 73 y.o. male initiated on antimicrobial therapy with IV Vancomycin for treatment of lower respiratory infection    Drug Allergies:   Review of patient's allergies indicates:   Allergen Reactions    Ativan [lorazepam] Anxiety       Actual Body Weight:   59 kg    Renal Function:   Estimated Creatinine Clearance: 38.6 mL/min (based on SCr of 1.4 mg/dL).,     Dialysis Method (if applicable):  N/A    CBC (last 72 hours):  Recent Labs   Lab Result Units 11/20/23  1606 11/22/23  0422   WBC K/uL 7.66 10.05   Hemoglobin g/dL 10.4* 11.4*   Hematocrit % 34.3* 37.0*   Platelets K/uL 226 240   Gran % % 77.7* 85.8*   Lymph % % 8.9* 4.0*   Mono % % 11.1 9.7   Eosinophil % % 1.7 0.0   Basophil % % 0.3 0.1   Differential Method  Automated Automated       Metabolic Panel (last 72 hours):  Recent Labs   Lab Result Units 11/20/23  1606 11/22/23  0422 11/23/23  0428   Sodium mmol/L 138 137 140   Potassium mmol/L 4.3 4.0 3.6   Chloride mmol/L 94* 93* 91*   CO2 mmol/L 34* 37* 33*   Glucose mg/dL 181* 131* 156*   BUN mg/dL 32* 40* 57*   Creatinine mg/dL 0.8 1.1 1.4   Albumin g/dL 3.1* 3.1*  --    Total Bilirubin mg/dL 0.2 0.4   --    Alkaline Phosphatase U/L 115 89  --    AST U/L 17 19  --    ALT U/L 17 19  --        Vancomycin Administrations:  vancomycin given in the last 96 hours                     vancomycin 1,500 mg in dextrose 5 % (D5W) 250 mL IVPB (Vial-Mate) (mg) 1,500 mg New Bag 11/21/23 1751    vancomycin 1,250 mg in dextrose 5 % (D5W) 250 mL IVPB (Vial-Mate) (mg) 1,250 mg New Bag 11/20/23 1840                    Microbiologic Results:  Microbiology Results (last 7 days)       Procedure Component Value Units Date/Time    Blood Culture #2 **CANNOT BE ORDERED STAT** [[id number]] Collected: 11/20/23 1730    Order Status: Completed Specimen: Blood from Peripheral, Forearm, Left Updated: 11/22/23 1903     Blood Culture, Routine No Growth to date      No Growth to date      No Growth to date    Blood Culture #1 **CANNOT BE ORDERED STAT** [[id number]] Collected: 11/20/23 1725    Order Status: Completed Specimen: Blood from Peripheral, Forearm, Right Updated: 11/22/23 1903     Blood Culture, Routine No Growth to date      No Growth to date      No Growth to date    Culture, Respiratory with Gram Stain [[id number]]  (Abnormal) Collected: 11/21/23 0813    Order Status: Completed Specimen: Respiratory from Tracheal Aspirate Updated: 11/22/23 1003     Respiratory Culture PRESUMPTIVE PSEUDOMONAS SPECIES  Many  Identification and susceptibility pending       Gram Stain (Respiratory) <10 epithelial cells per low power field.     Gram Stain (Respiratory) Few WBC's     Gram Stain (Respiratory) Few Gram positive cocci in pairs     Gram Stain (Respiratory) Few Gram positive rods     Gram Stain (Respiratory) Rare Gram negative rods

## 2023-11-23 NOTE — NURSING
[facility] [place]  Nurses Note -- 4 Eyes      11/23/2023       Skin assessed on: Transfer      [x] No Pressure Injuries Present    [x]Prevention Measures Documented    [] Yes LDA  for Pressure Injury Previously documented     [] Yes New Pressure Injury Discovered   [] LDA for New Pressure Injury Added      Attending RN:  Kirsten Tenorio RN     Second RN:  KAMILAH Hebert

## 2023-11-23 NOTE — ASSESSMENT & PLAN NOTE
Results for orders placed during the hospital encounter of 03/18/23    Echo    Interpretation Summary  · The left ventricle is normal in size with concentric hypertrophy and normal systolic function.  · The estimated ejection fraction is 60%. Inferobasal hypokinesis  · Indeterminate left ventricular diastolic function.  · Normal right ventricular size with normal right ventricular systolic function.  · Mild left atrial enlargement.  · Mild right atrial enlargement.  · Normal central venous pressure (3 mmHg).  · The estimated PA systolic pressure is 13 mmHg.  · The sinuses of Valsalva is mildly dilated.  BNP  Recent Labs   Lab 11/20/23  1606   BNP 1,708*       Patient does not appear to be volume overloaded on exam  - lasix 40mg IV x2 given, monitor UOP  - would hold off on lasix

## 2023-11-23 NOTE — PROGRESS NOTES
[facility] [place] Intensive Care  Pulmonology  Progress Note    Patient Name: Fareed Richard Jr.  MRN: [id number]  Admission Date: 11/20/2023  Hospital Length of Stay: 3 days  Code Status: Full Code  Attending Provider: Caprice Nava MD  Primary Care Provider: Kodi Tubbs MD   Principal Problem: Acute on chronic respiratory failure with hypoxia    Subjective:     Interval History: Mr. Richard is doing well. No complaints this       Objective:     Vital Signs (Most Recent):  Temp: 97.3 °F (36.3 °C) (11/23/23 0301)  Pulse: 85 (11/23/23 0932)  Resp: (!) 24 (11/23/23 0932)  BP: 135/76 (11/23/23 0932)  SpO2: (!) 90 % (11/23/23 0932) Vital Signs (24h Range):  Temp:  [97.1 °F (36.2 °C)-97.5 °F (36.4 °C)] 97.3 °F (36.3 °C)  Pulse:  [69-86] 85  Resp:  [15-37] 24  SpO2:  [84 %-99 %] 90 %  BP: ()/(53-76) 135/76     Weight: 59 kg (130 lb)  Body mass index is 19.77 kg/m².      Intake/Output Summary (Last 24 hours) at 11/23/2023 1131  Last data filed at 11/23/2023 0950  Gross per 24 hour   Intake 670.48 ml   Output 250 ml   Net 420.48 ml        Physical Exam  Vitals and nursing note reviewed.   Constitutional:       General: He is not in acute distress.     Appearance: He is ill-appearing. He is not toxic-appearing.   HENT:      Nose:      Comments: Abrasion on nose - present on admission  Eyes:      Conjunctiva/sclera: Conjunctivae normal.   Neck:      Comments: Size 6 Uncuffed Shiley tracheostomy tube in place   Cardiovascular:      Rate and Rhythm: Normal rate and regular rhythm.      Pulses: Normal pulses.      Heart sounds: Normal heart sounds.   Pulmonary:      Effort: Pulmonary effort is normal. No respiratory distress.      Breath sounds: Normal breath sounds. No wheezing, rhonchi or rales.      Comments: Trach collar   Abdominal:      General: Bowel sounds are normal. There is no distension.      Palpations: Abdomen is soft.      Tenderness: There is no abdominal tenderness. There is no guarding.      Comments:  PEG in place   Musculoskeletal:      Right lower leg: No edema.      Left lower leg: No edema.   Skin:     General: Skin is warm and dry.   Neurological:      Mental Status: He is alert. Mental status is at baseline.      Comments: Awake and alert, follows commands                Vents:  Oxygen Concentration (%): 50 (decreased from 60%) (11/23/23 0732)    Lines/Drains/Airways       Drain  Duration                  Gastrostomy/Enterostomy 01/04/22 Percutaneous endoscopic gastrostomy (PEG)  days              Airway  Duration             Adult Surgical Airway 03/16/23 1014 Shiley Cuffed 6.0 252 days              Peripheral Intravenous Line  Duration                  Peripheral IV - Single Lumen 11/22/23 1214 22 G Right Antecubital <1 day                    Significant Labs:    CBC/Anemia Profile:  Recent Labs   Lab 11/22/23 0422   WBC 10.05   HGB 11.4*   HCT 37.0*      MCV 91   RDW 14.0        Chemistries:  Recent Labs   Lab 11/22/23 0422 11/23/23  0428    140   K 4.0 3.6   CL 93* 91*   CO2 37* 33*   BUN 40* 57*   CREATININE 1.1 1.4   CALCIUM 9.7 10.2   ALBUMIN 3.1*  --    PROT 7.5  --    BILITOT 0.4  --    ALKPHOS 89  --    ALT 19  --    AST 19  --        All pertinent labs within the past 24 hours have been reviewed.    Significant Imaging:  I have reviewed all pertinent imaging results/findings within the past 24 hours.    ABG  Recent Labs   Lab 11/20/23  1639   PH 7.382   PO2 26*   PCO2 60.3*   HCO3 35.8*   BE 9*     Assessment/Plan:     Pulmonary  * Acute on chronic respiratory failure with hypoxia  Acute on chronic hypoxic respiratory failure is likely due to pneumonia vs COPD exacerbation +/- volume overload. 6.0 cuffless Shiley in place, typically on 5L trach collar. Increased sputum noted over the past several days. Know diastolic dysfunction with elevated BNP.     - Required AVAPS initially with significant improvement. Weaned to trach collar. If worsening respiratory distress develops,  we are available to change out trach for cuffed Shiley, but this is not necessary at this time.   - Agree with broad spectrum antibiotic coverage given the various different respiratory pathogens isolated in the past. Presumptive pseudomonas on respiratory culture; deescalate accordingly when finalized.   - Diurese as tolerated  - continue steroids and bronchodilators      Pneumonia due to Pseudomonas species  Pseudomonas that is sensitive to abx. Complete treatment.      COPD exacerbation  See respiratory failure     Endocrine  Severe protein-calorie malnutrition  Resume tube feeds via peg.         Pulmonary will sign off at this time.      Alessandra Meza MD  Pulmonology  [facility] - Intensive Care

## 2023-11-23 NOTE — SUBJECTIVE & OBJECTIVE
Interval History: No complaints today    Review of Systems   HENT:  Negative for sore throat.    Respiratory:  Negative for shortness of breath.    Cardiovascular:  Negative for chest pain.   Gastrointestinal:  Negative for abdominal pain.     Objective:     Vital Signs (Most Recent):  Temp: 97.3 °F (36.3 °C) (11/23/23 0301)  Pulse: 73 (11/23/23 0732)  Resp: (!) 25 (11/23/23 0732)  BP: 131/76 (11/23/23 0700)  SpO2: 99 % (11/23/23 0732) Vital Signs (24h Range):  Temp:  [97.1 °F (36.2 °C)-97.5 °F (36.4 °C)] 97.3 °F (36.3 °C)  Pulse:  [68-86] 73  Resp:  [15-37] 25  SpO2:  [84 %-99 %] 99 %  BP: ()/(53-76) 131/76     Weight: 59 kg (130 lb)  Body mass index is 19.77 kg/m².    Intake/Output Summary (Last 24 hours) at 11/23/2023 0851  Last data filed at 11/23/2023 0708  Gross per 24 hour   Intake 620.48 ml   Output --   Net 620.48 ml           Physical Exam  Vitals and nursing note reviewed.   Constitutional:       General: He is not in acute distress.     Appearance: He is ill-appearing. He is not toxic-appearing.      Comments: Thin man   HENT:      Nose:      Comments: Scab on nose     Mouth/Throat:      Comments: Edentulous.   Eyes:      Conjunctiva/sclera: Conjunctivae normal.   Neck:      Comments: Cuffless Trach in place  Cardiovascular:      Rate and Rhythm: Normal rate and regular rhythm.      Pulses: Normal pulses.      Heart sounds: Normal heart sounds.   Pulmonary:      Effort: Pulmonary effort is normal. No respiratory distress.      Breath sounds: Normal breath sounds. No wheezing, rhonchi or rales.      Comments: On trach collar  Abdominal:      General: Bowel sounds are normal. There is no distension.      Palpations: Abdomen is soft.      Tenderness: There is no abdominal tenderness. There is no guarding.      Comments: PEG in place   Musculoskeletal:      Right lower leg: No edema.      Left lower leg: No edema.   Skin:     General: Skin is warm and dry.   Neurological:      Mental Status: He is  alert. Mental status is at baseline.      Comments: Awake and alert             Significant Labs: All pertinent labs within the past 24 hours have been reviewed.    Significant Imaging: I have reviewed all pertinent imaging results/findings within the past 24 hours.

## 2023-11-23 NOTE — PROGRESS NOTES
[facility] Medicine  Progress Note    Patient Name: Fareed Richard Jr.  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     Patient presents with worsening shortness of breath that started 3 days prior to presentation.  He additionally reports worsening secretion from his tracheostomy.  Additional symptoms include wheezing.    In the ED, the patient was tachycardic (110), tachypneic and hypoxic (SpO2:  85%), and was placed on BiPAP.  Labs were remarkable for an elevated BNP (1708), elevated troponin (0.030).  VBG showed a pH of 7.36, pCO2 of 60.3, HC03 of 35.8.  Chest x-ray showed no significant change from prior studies.  Patient was given Lasix 40 mg IV, Solu-Medrol 80 mg IV, vancomycin and Zosyn.  He was admitted for further management.    Overview/Hospital Course:  Mr Fareed Richard Jr. Was admitted with acute hypoxic respiratory failure secondary to Pseudomonas PNA. Started BiPAP, zosyn, and admitted to ICU. Improving, weaned to trach collar.     Interval History: No complaints today    Review of Systems   HENT:  Negative for sore throat.    Respiratory:  Negative for shortness of breath.    Cardiovascular:  Negative for chest pain.   Gastrointestinal:  Negative for abdominal pain.     Objective:     Vital Signs (Most Recent):  Temp: 97.3 °F (36.3 °C) (11/23/23 0301)  Pulse: 73 (11/23/23 0732)  Resp: (!) 25 (11/23/23 0732)  BP: 131/76 (11/23/23 0700)  SpO2: 99 % (11/23/23 0732) Vital Signs (24h Range):  Temp:  [97.1 °F (36.2 °C)-97.5 °F (36.4 °C)] 97.3 °F  (36.3 °C)  Pulse:  [68-86] 73  Resp:  [15-37] 25  SpO2:  [84 %-99 %] 99 %  BP: ()/(53-76) 131/76     Weight: 59 kg (130 lb)  Body mass index is 19.77 kg/m².    Intake/Output Summary (Last 24 hours) at 11/23/2023 0851  Last data filed at 11/23/2023 0708  Gross per 24 hour   Intake 620.48 ml   Output --   Net 620.48 ml           Physical Exam  Vitals and nursing note reviewed.   Constitutional:       General: He is not in acute distress.     Appearance: He is ill-appearing. He is not toxic-appearing.      Comments: Thin man   HENT:      Nose:      Comments: Scab on nose     Mouth/Throat:      Comments: Edentulous.   Eyes:      Conjunctiva/sclera: Conjunctivae normal.   Neck:      Comments: Cuffless Trach in place  Cardiovascular:      Rate and Rhythm: Normal rate and regular rhythm.      Pulses: Normal pulses.      Heart sounds: Normal heart sounds.   Pulmonary:      Effort: Pulmonary effort is normal. No respiratory distress.      Breath sounds: Normal breath sounds. No wheezing, rhonchi or rales.      Comments: On trach collar  Abdominal:      General: Bowel sounds are normal. There is no distension.      Palpations: Abdomen is soft.      Tenderness: There is no abdominal tenderness. There is no guarding.      Comments: PEG in place   Musculoskeletal:      Right lower leg: No edema.      Left lower leg: No edema.   Skin:     General: Skin is warm and dry.   Neurological:      Mental Status: He is alert. Mental status is at baseline.      Comments: Awake and alert             Significant Labs: All pertinent labs within the past 24 hours have been reviewed.    Significant Imaging: I have reviewed all pertinent imaging results/findings within the past 24 hours.    Assessment/Plan:      * Acute on chronic respiratory failure with hypoxia  Patient with Hypoxic Respiratory failure which is Acute on chronic.  he is on home oxygen at 6 LPM trach collar. Supplemental oxygen was provided and noted-   Likely in the  setting of COPD exacerbation/underlying pneumonia.   CXR unremarkable.   - continue COPD treatment   - continue zosyn x7 days. Trach aspirate culture Pseudomonas  - BNP elevated, potentially CHF also contributing. Lasix 40mg IV x2 given, appears euvolemic.   Recent Labs   Lab 11/20/23  1606   BNP 1,708*     - Pulmonary is following    Pneumonia due to Pseudomonas species  Trach aspirate with Pseudomonas  - zosyn x7 days       Normocytic anemia  Patient's anemia is currently controlled. Has not received any PRBCs to date. 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    Acute on chronic diastolic heart failure  Results for orders placed during the hospital encounter of 03/18/23    Echo    Interpretation Summary  · The left ventricle is normal in size with concentric hypertrophy and normal systolic function.  · The estimated ejection fraction is 60%. Inferobasal hypokinesis  · Indeterminate left ventricular diastolic function.  · Normal right ventricular size with normal right ventricular systolic function.  · Mild left atrial enlargement.  · Mild right atrial enlargement.  · Normal central venous pressure (3 mmHg).  · The estimated PA systolic pressure is 13 mmHg.  · The sinuses of Valsalva is mildly dilated.  BNP  Recent Labs   Lab 11/20/23  1606   BNP 1,708*       Patient does not appear to be volume overloaded on exam  - lasix 40mg IV x2 given, monitor UOP  - would hold off on lasix      PEG (percutaneous endoscopic gastrostomy) status  History noted.     Tracheostomy present  Currently has cuffless Shiley 6.0      ACP (advance care planning)  Advance Care Planning    Date: 11/21/2023  Confirmed full code status           Severe protein-calorie malnutrition  Nutrition  consulted. Most recent weight and BMI monitored-     Measurements:  Wt Readings from Last 1 Encounters:   11/21/23 59 kg (130 lb)   Body mass index is 19.77 kg/m².    Patient has been screened and assessed by RD.  - resumed tube feeding     COPD exacerbation  Patient's COPD is with exacerbation noted by continued dyspnea and use of accessory muscles for breathing currently.  Patient is currently off COPD Pathway (ICU admit). Continue scheduled inhalers Steroids, Antibiotics, and Supplemental oxygen, scheduled nebs and monitor respiratory status closely.   - On AVAPS previously, now on trach collar   - Pulmonary following  - changed solumedrol to prednisone to complete 5 days  - trach aspirate culture= Pseudomonas, continue zosyn    Other hyperlipidemia  Continue statin      Primary hypertension  BP is currently well controlled  - continue metoprolol     Coronary artery disease involving native coronary artery of native heart without angina pectoris  No acute issues.  No signs of ACS  - continue asa, statin, plavix     Squamous cell cancer of tongue  History noted. Cancer is not active. Outpatient follow up         VTE Risk Mitigation (From admission, onward)           Ordered     heparin (porcine) injection 5,000 Units  Every 8 hours         11/20/23 1944     IP VTE HIGH RISK PATIENT  Once         11/20/23 1944     Place sequential compression device  Until discontinued         11/20/23 1944                    Discharge Planning   NISA:      Code Status: Full Code   Is the patient medically ready for discharge?:     Reason for patient still in hospital (select all that apply): Patient trending condition  Discharge Plan A: Home with family            Critical care time spent on the evaluation and treatment of severe organ dysfunction, review of pertinent labs and imaging studies, discussions with consulting providers and discussions with patient/family: 30 minutes.      Caprice Nava MD  Department of Hospital  Medicine   [facility] - Intensive Care

## 2023-11-23 NOTE — PT/OT/SLP EVAL
Physical Therapy Evaluation    Patient Name:  Fareed Richard Jr.   MRN:  [id number]    Recommendations:     Discharge Recommendations: Low Intensity Therapy   Discharge Equipment Recommendations: to be determined by next level of care   Barriers to discharge:  Pt with higher O2 demands than baseline    Assessment:     Fareed Richard Jr. is a 73 y.o. male admitted with a medical diagnosis of Acute on chronic respiratory failure with hypoxia.  He presents with the following impairments/functional limitations: impaired endurance, impaired self care skills, impaired functional mobility, gait instability, decreased safety awareness, impaired cardiopulmonary response to activity Pt with good functional mobility, requires assist to manage lines/ tubes but good strength for transfers and ambulation. Pt will benefit from skilled therapy to improve cardiorespiratory response to activity and dec risk for falls.    BP 89/55 supine w/ HOB raised  SpO2: 86-91% throughout treatment.    Rehab Prognosis: Good; patient would benefit from acute skilled PT services to address these deficits and reach maximum level of function.    Recent Surgery: * No surgery found *      Plan:     During this hospitalization, patient to be seen 2 x/week to address the identified rehab impairments via gait training, therapeutic activities, therapeutic exercises, neuromuscular re-education and progress toward the following goals:    Plan of Care Expires:  12/07/23    Subjective     Chief Complaint: hard to move with all the lines/ tubes  Patient/Family Comments/goals: get up to chair  Pain/Comfort:  Pain Rating 1: 0/10  Pain Rating Post-Intervention 1: 0/10    Patients cultural, spiritual, Yarsanism conflicts given the current situation: no    Living Environment:  Pt lives w/ spouse in [facility] w/ 5 BECKY, HR available  Prior to admission, patients level of function was wife helped pt as needed.  Equipment used at home: bedside commode, oxygen, hospital bed,  walker, rolling, suction machine, respiratory supplies, feeding device, nutrition supplies.  DME owned (not currently used): none.  Upon discharge, patient will have assistance from his wife.    Objective:     Communicated with Grisel burr prior to session.  Patient found HOB elevated with blood pressure cuff, oxygen, peripheral IV, pulse ox (continuous), telemetry, Tracheostomy  upon PT entry to room.    General Precautions: Standard, fall, respiratory  Orthopedic Precautions:N/A   Braces: N/A  Respiratory Status:  Trach collar  5L @50%    Exams:  Cognitive Exam:  Patient is oriented to Person, Place, Time, and Situation  RLE ROM: WFL  RLE Strength: WFL  LLE ROM: WFL  LLE Strength: WFL    Functional Mobility:  Bed Mobility:     Rolling Left:  contact guard assistance  Scooting: contact guard assistance  Supine to Sit: contact guard assistance  Transfers:     Sit to Stand:  contact guard assistance with no AD  Bed to Chair: contact guard assistance with  no AD  using  Step Transfer  Gait: Pt amb 5 ft from bed to chair w/out AD      AM-PAC 6 CLICK MOBILITY  Total Score:18       Treatment & Education:  Pt educated on importance of sitting up in chair throughout the day and importance of continued mobility    Patient left up in chair with all lines intact, call button in reach, and nsg notified.    GOALS:   Multidisciplinary Problems       Physical Therapy Goals          Problem: Physical Therapy    Goal Priority Disciplines Outcome Goal Variances Interventions   Physical Therapy Goal     PT, PT/OT Ongoing, Progressing     Description: Goals to be met by: 23     Patient will increase functional independence with mobility by performin. Supine to sit with Modified [place]  2. Rolling to Left and Right with Modified [place].  3. Sit to stand transfer with Modified [place]  4. Bed to chair transfer with Modified [place] using No Assistive Device  5. Gait  x 300 feet with Modified [place]  using No Assistive Device.                          History:     Past Medical History:   Diagnosis Date    Cancer     skin to Rt forearm and face    COPD (chronic obstructive pulmonary disease)     Hyperlipidemia     Hypertension     Pseudoaneurysm        Past Surgical History:   Procedure Laterality Date    ABDOMINAL SURGERY      stents placed in liver and large intestines, per patient    ANGIOGRAPHY OF AORTIC ARCH  3/27/2023    Procedure: Aortogram, Aortic Arch;  Surgeon: Tim Bhatt MD;  Location: [facility] CATH LAB;  Service: Cardiology;;    AORTOGRAPHY WITH SERIALOGRAPHY N/A 3/27/2023    Procedure: AORTOGRAM, WITH SERIALOGRAPHY;  Surgeon: Tim Bhatt MD;  Location: [facility] CATH LAB;  Service: Cardiology;  Laterality: N/A;    CAROTID STENT      COLONOSCOPY N/A 09/27/2022    Procedure: COLONOSCOPY;  Surgeon: Donnie Peterson MD;  [facility]  Service: Endoscopy;  Laterality: N/A;    COLONOSCOPY N/A 09/30/2022    Procedure: COLONOSCOPY;  Surgeon: Joy Cabrera MD;  [facility]  Service: Endoscopy;  Laterality: N/A;    CORONARY STENT PLACEMENT  01/2000    DISSECTION OF NECK Bilateral 01/04/2022    Procedure: DISSECTION, NECK;  Surgeon: Naeem Smalls MD;  [facility];  Service: ENT;  Laterality: Bilateral;    ESOPHAGOGASTRODUODENOSCOPY N/A 09/27/2022    Procedure: EGD (ESOPHAGOGASTRODUODENOSCOPY);  Surgeon: Donnie Peterson MD;  Location: [facility] ENDO (2ND FLR);  Service: Endoscopy;  Laterality: N/A;    ESOPHAGOGASTRODUODENOSCOPY N/A 09/30/2022    Procedure: EGD (ESOPHAGOGASTRODUODENOSCOPY);  Surgeon: oJy Cabrera MD;  Location: [facility] ENDO (2ND FLR);  Service: Endoscopy;  Laterality: N/A;    ESOPHAGOGASTRODUODENOSCOPY W/ PEG N/A 01/04/2022    Procedure: EGD, WITH PEG TUBE INSERTION;  Surgeon: Anthony Calabrese MD;  Location: [facility] OR 2ND FLR;  Service: General;  Laterality: N/A;    EYE SURGERY      Cataract bilateral    femoral stents      bilateral    FLAP  PROCEDURE N/A 01/03/2022    Procedure: CREATION, FREE FLAP;  Surgeon: Naeem Smalls MD;  Location: [facility] OR 2ND FLR;  Service: ENT;  Laterality: N/A;    FLAP PROCEDURE Right 01/04/2022    Procedure: CREATION, FREE FLAP;  Surgeon: Stacey Conde MD;  Location: [facility] OR 2ND FLR;  Service: ENT;  Laterality: Right;  Ischemic start 1203  Ischemic stop 1353    GLOSSECTOMY N/A 01/04/2022    Procedure: GLOSSECTOMY;  Surgeon: Naeem Smalls MD;  Location: [facility] OR 2ND FLR;  Service: ENT;  Laterality: N/A;    LEFT HEART CATHETERIZATION Left 3/23/2023    Procedure: Left heart cath;  Surgeon: Tacos Benitez MD;  [facility];  Service: Cardiology;  Laterality: Left;    PERCUTANEOUS TRANSLUMINAL BALLOON ANGIOPLASTY OF CORONARY ARTERY Left 3/27/2023    Procedure: Angioplasty-coronary;  Surgeon: Tim Bhatt MD;  [facility] C[facility];  Service: Cardiology;  Laterality: Left;  not before 9am, R rad access, 6 Fr EBU 3.5 guide    RADICAL NECK DISSECTION Left 01/03/2022    Procedure: DISSECTION, NECK, RADICAL;  Surgeon: Naeem Smalls MD;  [facility];  Service: ENT;  Laterality: Left;    SKIN BIOPSY      SKIN CANCER EXCISION      STENT, CORONARY, MULTI VESSEL  3/27/2023    Procedure: Stent, Coronary, Multi Vessel;  Surgeon: Tim Bhatt MD;  Location[facility] CATH [facility];  Service: Cardiology;;    TRACHEOTOMY N/A 01/04/2022    Procedure: TRACHEOTOMY;  Surgeon: Naeem Smalls MD;  Location: [facility] OR 2ND FLR;  Service: ENT;  Laterality: N/A;    VASCULAR SURGERY         Time Tracking:     PT Received On: 11/23/23  PT Start Time: 1054     PT Stop Time: 1110  PT Total Time (min): 16 min     Billable Minutes: Evaluation 16      11/23/2023

## 2023-11-23 NOTE — ASSESSMENT & PLAN NOTE
Patient's COPD is with exacerbation noted by continued dyspnea and use of accessory muscles for breathing currently.  Patient is currently off COPD Pathway (ICU admit). Continue scheduled inhalers Steroids, Antibiotics, and Supplemental oxygen, scheduled nebs and monitor respiratory status closely.   - On AVAPS previously, now on trach collar   - Pulmonary following  - changed solumedrol to prednisone to complete 5 days  - trach aspirate culture= Pseudomonas, continue zosyn

## 2023-11-23 NOTE — SUBJECTIVE & OBJECTIVE
Interval History: Mr. Richard is doing well. No complaints this       Objective:     Vital Signs (Most Recent):  Temp: 97.3 °F (36.3 °C) (11/23/23 0301)  Pulse: 85 (11/23/23 0932)  Resp: (!) 24 (11/23/23 0932)  BP: 135/76 (11/23/23 0932)  SpO2: (!) 90 % (11/23/23 0932) Vital Signs (24h Range):  Temp:  [97.1 °F (36.2 °C)-97.5 °F (36.4 °C)] 97.3 °F (36.3 °C)  Pulse:  [69-86] 85  Resp:  [15-37] 24  SpO2:  [84 %-99 %] 90 %  BP: ()/(53-76) 135/76     Weight: 59 kg (130 lb)  Body mass index is 19.77 kg/m².      Intake/Output Summary (Last 24 hours) at 11/23/2023 1131  Last data filed at 11/23/2023 0950  Gross per 24 hour   Intake 670.48 ml   Output 250 ml   Net 420.48 ml        Physical Exam  Vitals and nursing note reviewed.   Constitutional:       General: He is not in acute distress.     Appearance: He is ill-appearing. He is not toxic-appearing.   HENT:      Nose:      Comments: Abrasion on nose - present on admission  Eyes:      Conjunctiva/sclera: Conjunctivae normal.   Neck:      Comments: Size 6 Uncuffed Shiley tracheostomy tube in place   Cardiovascular:      Rate and Rhythm: Normal rate and regular rhythm.      Pulses: Normal pulses.      Heart sounds: Normal heart sounds.   Pulmonary:      Effort: Pulmonary effort is normal. No respiratory distress.      Breath sounds: Normal breath sounds. No wheezing, rhonchi or rales.      Comments: Trach collar   Abdominal:      General: Bowel sounds are normal. There is no distension.      Palpations: Abdomen is soft.      Tenderness: There is no abdominal tenderness. There is no guarding.      Comments: PEG in place   Musculoskeletal:      Right lower leg: No edema.      Left lower leg: No edema.   Skin:     General: Skin is warm and dry.   Neurological:      Mental Status: He is alert. Mental status is at baseline.      Comments: Awake and alert, follows commands                Vents:  Oxygen Concentration (%): 50 (decreased from 60%) (11/23/23  0732)    Lines/Drains/Airways       Drain  Duration                  Gastrostomy/Enterostomy 01/04/22 Percutaneous endoscopic gastrostomy (PEG)  days              Airway  Duration             Adult Surgical Airway 03/16/23 1014 Shiley Cuffed 6.0 252 days              Peripheral Intravenous Line  Duration                  Peripheral IV - Single Lumen 11/22/23 1214 22 G Right Antecubital <1 day                    Significant Labs:    CBC/Anemia Profile:  Recent Labs   Lab 11/22/23 0422   WBC 10.05   HGB 11.4*   HCT 37.0*      MCV 91   RDW 14.0        Chemistries:  Recent Labs   Lab 11/22/23 0422 11/23/23 0428    140   K 4.0 3.6   CL 93* 91*   CO2 37* 33*   BUN 40* 57*   CREATININE 1.1 1.4   CALCIUM 9.7 10.2   ALBUMIN 3.1*  --    PROT 7.5  --    BILITOT 0.4  --    ALKPHOS 89  --    ALT 19  --    AST 19  --        All pertinent labs within the past 24 hours have been reviewed.    Significant Imaging:  I have reviewed all pertinent imaging results/findings within the past 24 hours.

## 2023-11-23 NOTE — PLAN OF CARE
Problem: Physical Therapy  Goal: Physical Therapy Goal  Description: Goals to be met by: 23     Patient will increase functional independence with mobility by performin. Supine to sit with Modified [place]  2. Rolling to Left and Right with Modified [place].  3. Sit to stand transfer with Modified [place]  4. Bed to chair transfer with Modified [place] using No Assistive Device  5. Gait  x 300 feet with Modified [place] using No Assistive Device.     Outcome: Ongoing, Progressing     PT evaluation completed today. Pt with good functional mobility, requires assist to manage lines/ tubes but good strength for transfers and ambulation. Pt will benefit from skilled therapy to improve cardiorespiratory response to activity and dec risk for falls.

## 2023-11-24 LAB
ANION GAP SERPL CALC-SCNC: 12 MMOL/L (ref 8–16)
BACTERIA BLD CULT: NORMAL
BACTERIA BLD CULT: NORMAL
BASOPHILS # BLD AUTO: 0.01 K/UL (ref 0–0.2)
BASOPHILS NFR BLD: 0.1 % (ref 0–1.9)
BUN SERPL-MCNC: 57 MG/DL (ref 8–23)
CALCIUM SERPL-MCNC: 9.9 MG/DL (ref 8.7–10.5)
CHLORIDE SERPL-SCNC: 97 MMOL/L (ref 95–110)
CO2 SERPL-SCNC: 35 MMOL/L (ref 23–29)
CREAT SERPL-MCNC: 1.2 MG/DL (ref 0.5–1.4)
DIFFERENTIAL METHOD BLD: ABNORMAL
EOSINOPHIL # BLD AUTO: 0 K/UL (ref 0–0.5)
EOSINOPHIL NFR BLD: 0.1 % (ref 0–8)
ERYTHROCYTE [DISTWIDTH] IN BLOOD BY AUTOMATED COUNT: 13.8 % (ref 11.5–14.5)
EST. GFR  (NO RACE VARIABLE): >60 ML/MIN/1.73 M^2
GLUCOSE SERPL-MCNC: 140 MG/DL (ref 70–110)
HCT VFR BLD AUTO: 36.5 % (ref 40–54)
HGB BLD-MCNC: 11.1 G/DL (ref 14–18)
IMM GRANULOCYTES # BLD AUTO: 0.03 K/UL (ref 0–0.04)
IMM GRANULOCYTES NFR BLD AUTO: 0.4 % (ref 0–0.5)
LYMPHOCYTES # BLD AUTO: 0.7 K/UL (ref 1–4.8)
LYMPHOCYTES NFR BLD: 10.4 % (ref 18–48)
MCH RBC QN AUTO: 28.1 PG (ref 27–31)
MCHC RBC AUTO-ENTMCNC: 30.4 G/DL (ref 32–36)
MCV RBC AUTO: 92 FL (ref 82–98)
MONOCYTES # BLD AUTO: 1 K/UL (ref 0.3–1)
MONOCYTES NFR BLD: 13.7 % (ref 4–15)
NEUTROPHILS # BLD AUTO: 5.3 K/UL (ref 1.8–7.7)
NEUTROPHILS NFR BLD: 75.3 % (ref 38–73)
NRBC BLD-RTO: 0 /100 WBC
PLATELET # BLD AUTO: 251 K/UL (ref 150–450)
PMV BLD AUTO: 9.9 FL (ref 9.2–12.9)
POCT GLUCOSE: 133 MG/DL (ref 70–110)
POCT GLUCOSE: 143 MG/DL (ref 70–110)
POCT GLUCOSE: 144 MG/DL (ref 70–110)
POTASSIUM SERPL-SCNC: 3.4 MMOL/L (ref 3.5–5.1)
RBC # BLD AUTO: 3.95 M/UL (ref 4.6–6.2)
SODIUM SERPL-SCNC: 144 MMOL/L (ref 136–145)
WBC # BLD AUTO: 7 K/UL (ref 3.9–12.7)

## 2023-11-24 PROCEDURE — 36415 COLL VENOUS BLD VENIPUNCTURE: CPT | Performed by: HOSPITALIST

## 2023-11-24 PROCEDURE — 80048 BASIC METABOLIC PNL TOTAL CA: CPT | Performed by: HOSPITALIST

## 2023-11-24 PROCEDURE — 94761 N-INVAS EAR/PLS OXIMETRY MLT: CPT

## 2023-11-24 PROCEDURE — 85025 COMPLETE CBC W/AUTO DIFF WBC: CPT | Performed by: HOSPITALIST

## 2023-11-24 PROCEDURE — 25000003 PHARM REV CODE 250: Performed by: HOSPITALIST

## 2023-11-24 PROCEDURE — 27000221 HC OXYGEN, UP TO 24 HOURS

## 2023-11-24 PROCEDURE — 99900035 HC TECH TIME PER 15 MIN (STAT)

## 2023-11-24 PROCEDURE — 25000242 PHARM REV CODE 250 ALT 637 W/ HCPCS: Performed by: HOSPITALIST

## 2023-11-24 PROCEDURE — 27200966 HC CLOSED SUCTION SYSTEM

## 2023-11-24 PROCEDURE — 21400001 HC TELEMETRY ROOM

## 2023-11-24 PROCEDURE — 63600175 PHARM REV CODE 636 W HCPCS: Performed by: HOSPITALIST

## 2023-11-24 PROCEDURE — 94640 AIRWAY INHALATION TREATMENT: CPT

## 2023-11-24 PROCEDURE — 63600175 PHARM REV CODE 636 W HCPCS: Performed by: EMERGENCY MEDICINE

## 2023-11-24 PROCEDURE — 31720 CLEARANCE OF AIRWAYS: CPT

## 2023-11-24 RX ORDER — MAGNESIUM SULFATE 1 G/100ML
1 INJECTION INTRAVENOUS ONCE
Status: COMPLETED | OUTPATIENT
Start: 2023-11-24 | End: 2023-11-24

## 2023-11-24 RX ADMIN — ASPIRIN 81 MG CHEWABLE TABLET 81 MG: 81 TABLET CHEWABLE at 09:11

## 2023-11-24 RX ADMIN — BUDESONIDE 0.5 MG: 0.5 INHALANT RESPIRATORY (INHALATION) at 08:11

## 2023-11-24 RX ADMIN — PIPERACILLIN AND TAZOBACTAM 4.5 G: 4; .5 INJECTION, POWDER, LYOPHILIZED, FOR SOLUTION INTRAVENOUS; PARENTERAL at 11:11

## 2023-11-24 RX ADMIN — PIPERACILLIN AND TAZOBACTAM 4.5 G: 4; .5 INJECTION, POWDER, LYOPHILIZED, FOR SOLUTION INTRAVENOUS; PARENTERAL at 04:11

## 2023-11-24 RX ADMIN — IPRATROPIUM BROMIDE AND ALBUTEROL SULFATE 3 ML: 2.5; .5 SOLUTION RESPIRATORY (INHALATION) at 08:11

## 2023-11-24 RX ADMIN — PREDNISONE 40 MG: 20 TABLET ORAL at 09:11

## 2023-11-24 RX ADMIN — Medication 6 MG: at 09:11

## 2023-11-24 RX ADMIN — PIPERACILLIN AND TAZOBACTAM 4.5 G: 4; .5 INJECTION, POWDER, LYOPHILIZED, FOR SOLUTION INTRAVENOUS; PARENTERAL at 08:11

## 2023-11-24 RX ADMIN — METOPROLOL TARTRATE 12.5 MG: 25 TABLET, FILM COATED ORAL at 09:11

## 2023-11-24 RX ADMIN — FOLIC ACID 1 MG: 1 TABLET ORAL at 09:11

## 2023-11-24 RX ADMIN — MAGNESIUM SULFATE 1 G: 1 INJECTION INTRAVENOUS at 03:11

## 2023-11-24 RX ADMIN — INSULIN ASPART 1 UNITS: 100 INJECTION, SOLUTION INTRAVENOUS; SUBCUTANEOUS at 01:11

## 2023-11-24 RX ADMIN — ATORVASTATIN CALCIUM 40 MG: 40 TABLET, FILM COATED ORAL at 09:11

## 2023-11-24 RX ADMIN — IPRATROPIUM BROMIDE AND ALBUTEROL SULFATE 3 ML: 2.5; .5 SOLUTION RESPIRATORY (INHALATION) at 12:11

## 2023-11-24 RX ADMIN — FAMOTIDINE 20 MG: 10 INJECTION INTRAVENOUS at 09:11

## 2023-11-24 RX ADMIN — SODIUM CHLORIDE SOLN NEBU 3% 4 ML: 3 NEBU SOLN at 02:11

## 2023-11-24 RX ADMIN — CLOPIDOGREL BISULFATE 75 MG: 75 TABLET ORAL at 09:11

## 2023-11-24 RX ADMIN — SODIUM CHLORIDE SOLN NEBU 3% 4 ML: 3 NEBU SOLN at 08:11

## 2023-11-24 RX ADMIN — HEPARIN SODIUM 5000 UNITS: 5000 INJECTION INTRAVENOUS; SUBCUTANEOUS at 09:11

## 2023-11-24 RX ADMIN — MUPIROCIN: 20 OINTMENT TOPICAL at 09:11

## 2023-11-24 RX ADMIN — ARFORMOTEROL TARTRATE 15 MCG: 15 SOLUTION RESPIRATORY (INHALATION) at 08:11

## 2023-11-24 RX ADMIN — Medication 50 MG: at 09:11

## 2023-11-24 RX ADMIN — FLUOXETINE HYDROCHLORIDE 20 MG: 20 SOLUTION ORAL at 09:11

## 2023-11-24 RX ADMIN — HEPARIN SODIUM 5000 UNITS: 5000 INJECTION INTRAVENOUS; SUBCUTANEOUS at 01:11

## 2023-11-24 RX ADMIN — IPRATROPIUM BROMIDE AND ALBUTEROL SULFATE 3 ML: 2.5; .5 SOLUTION RESPIRATORY (INHALATION) at 02:11

## 2023-11-24 RX ADMIN — SODIUM CHLORIDE SOLN NEBU 3% 4 ML: 3 NEBU SOLN at 09:11

## 2023-11-24 RX ADMIN — ARFORMOTEROL TARTRATE 15 MCG: 15 SOLUTION RESPIRATORY (INHALATION) at 09:11

## 2023-11-24 NOTE — PT/OT/SLP PROGRESS
Physical Therapy      Patient Name:  Fareed Richard Jr.   MRN:  [id number]    Patient not seen today secondary to  (pt recieving breathing treatment. Reports he has been getting out of bed to use bathroom without issues. Would like to hold PT today). Will follow-up 11/27/23.

## 2023-11-24 NOTE — PROGRESS NOTES
[facility] Medicine  Progress Note    Patient Name: Fareed Richard Jr.  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     Patient presents with worsening shortness of breath that started 3 days prior to presentation.  He additionally reports worsening secretion from his tracheostomy.  Additional symptoms include wheezing.    In the ED, the patient was tachycardic (110), tachypneic and hypoxic (SpO2:  85%), and was placed on BiPAP.  Labs were remarkable for an elevated BNP (1708), elevated troponin (0.030).  VBG showed a pH of 7.36, pCO2 of 60.3, HC03 of 35.8.  Chest x-ray showed no significant change from prior studies.  Patient was given Lasix 40 mg IV, Solu-Medrol 80 mg IV, vancomycin and Zosyn.  He was admitted for further management.    Overview/Hospital Course:  Mr Fareed Richard Jr. Was admitted with acute hypoxic respiratory failure secondary to Pseudomonas PNA. Started BiPAP, zosyn, and admitted to ICU. Improving, weaned to trach collar.     No new subjective & objective note has been filed under this hospital service since the last note was generated.      Interval History: No overnights complaint. At 11:44 am patient had a 10-beat run of V-tach while having a large BM. Wife at bedside         Objective:        Vital Signs Range (Last 24H):  Temp:  [97.2 °F (36.2 °C)-98.3 °F (36.8 °C)]   Pulse:  [69-89]   Resp:  [18-20]   BP: (103-133)/(64-77)   SpO2:  [93 %-99 %] Body mass index is 19.77 kg/m².    I & O (Last 24H):  Intake/Output Summary (Last 24  hours) at 11/24/2023 1307  Last data filed at 11/24/2023 1217  Gross per 24 hour   Intake 630 ml   Output 550 ml   Net 80 ml       PHYSICAL EXAMINATION:  Vitals and nursing note reviewed. Relatively normal vitals  GENERAL: NAD, alert and O x 3, well-developed, cachetic  HEENT: Head is normocephalic and atraumatic. Extraocular muscles are intact. Pupils are equal, round, and reactive to light and accommodation. Nares appeared normal. Mouth is without lesions. Mucous membranes are moist. no nystagmus Tracheostomy (Shiley) in place.   Physical Exam  Pulmonary:      Effort: No tachypnea, bradypnea, accessory muscle usage, prolonged expiration or retractions.      Breath sounds: Transmitted upper airway sounds present.         CV:  no obvious JVD  ABDOMEN:  nondistended.  No guarding  BACK: FROM, neg SLR  EXTREMITIES: TOLEDO x 4, FROM, no obvious swelling, no pedal edema  SKIN: warm, dry, intact, no rash/lesions, no pallor  NEUROLOGIC: GCS 15,  Cranial nerves II through XII are grossly intact. No obvious motor deficit  PSYCH: nl attitude, nl mood/affect, nl speech, nl thought process/form, no SI/HI, no AH/VH      Scheduled Meds:   albuterol-ipratropium  3 mL Nebulization Q6H    budesonide  0.5 mg Nebulization Q12H    And    arformoteroL  15 mcg Nebulization BID    aspirin  81 mg Oral Daily    atorvastatin  40 mg Per G Tube Daily    clopidogreL  75 mg Oral Nightly    famotidine (PF)  20 mg Intravenous Daily    FLUoxetine  20 mg Oral Daily    folic acid  1 mg Oral Daily    heparin (porcine)  5,000 Units Subcutaneous Q8H    metoprolol tartrate  12.5 mg Oral BID    mupirocin   Nasal BID    piperacillin-tazobactam (Zosyn) IV (PEDS and ADULTS) (extended infusion is not appropriate)  4.5 g Intravenous Q8H    predniSONE  40 mg Per G Tube Daily    sodium chloride 3%  4 mL Nebulization Q6H WAKE    thiamine  50 mg Oral Daily     Continuous Infusions:  PRN Meds:.acetaminophen, albuterol-ipratropium, calcium gluconate IVPB, calcium  gluconate IVPB, calcium gluconate IVPB, dextrose 10%, dextrose 10%, glucagon (human recombinant), hydrOXYzine HCL, insulin aspart U-100, magnesium sulfate IVPB, magnesium sulfate IVPB, melatonin, ondansetron, potassium chloride **AND** potassium chloride **AND** potassium chloride, prochlorperazine, sodium chloride 0.9%, sodium phosphate 15 mmol in dextrose 5 % (D5W) 250 mL IVPB, sodium phosphate 20.01 mmol in dextrose 5 % (D5W) 250 mL IVPB, sodium phosphate 30 mmol in dextrose 5 % (D5W) 250 mL IVPB    Diagnostic Results:  Lab Results   Component Value Date    WBC 7.00 11/24/2023    HGB 11.1 (L) 11/24/2023    HCT 36.5 (L) 11/24/2023    MCV 92 11/24/2023     11/24/2023      Latest Reference Range & Units 11/22/23 04:22 11/23/23 04:28 11/24/23 03:55   Sodium 136 - 145 mmol/L 137 140 144   Potassium 3.5 - 5.1 mmol/L 4.0 3.6 3.4 (L)   Chloride 95 - 110 mmol/L 93 (L) 91 (L) 97   CO2 23 - 29 mmol/L 37 (H) 33 (H) 35 (H)   Anion Gap 8 - 16 mmol/L 7 (L) 16 12   BUN 8 - 23 mg/dL 40 (H) 57 (H) 57 (H)   Creatinine 0.5 - 1.4 mg/dL 1.1 1.4 1.2   eGFR >60 mL/min/1.73 m^2 >60 53 ! >60   Glucose 70 - 110 mg/dL 131 (H) 156 (H) 140 (H)   Calcium 8.7 - 10.5 mg/dL 9.7 10.2 9.9       Lab Results   Component Value Date    INR 1.0 04/02/2023    INR 1.2 03/27/2023    INR 1.0 03/20/2023     Lab Results   Component Value Date    HGBA1C 5.8 (H) 08/29/2023     Recent Labs     11/22/23  1122 11/22/23  2216 11/23/23  0634 11/23/23  1227 11/23/23  1604 11/23/23  2352 11/24/23  0643 11/24/23  0730   POCTGLUCOSE 133* 157* 158* 123* 179* 215* 143* 144*       ASSESSMENT/PLAN:     List of  Current Problems:  Active Hospital Problems    Diagnosis  POA    *Acute on chronic respiratory failure with hypoxia [J96.21]  Yes    Pneumonia due to Pseudomonas species [J15.1]  Yes    Normocytic anemia [D64.9]  Yes    Acute on chronic diastolic heart failure [I50.33]  Yes    Tracheostomy present [Z93.0]  Not Applicable     Tracheotomy at time of glossectomy  by Dr. Smalls in January 2022.  Routine trach changes in ENT clinic since that time.    Most recent trach change on 10/9/2023 => currently with uncuffed 7.5mm inner diameter tracheostomy tube in place.        PEG (percutaneous endoscopic gastrostomy) status [Z93.1]  Not Applicable    ACP (advance care planning) [Z71.89]  Not Applicable    Severe protein-calorie malnutrition [E43]  Yes    COPD exacerbation [J44.1]  Yes    Primary hypertension [I10]  Yes    Other hyperlipidemia [E78.49]  Yes    Coronary artery disease involving native coronary artery of native heart without angina pectoris [I25.10]  Yes    Squamous cell cancer of tongue [C02.9]  Yes     12/15/21 FNA left neck mass : squamous cell carcinoma, p16 negative  1/4/22 total glossectomy, bilateral neck dissection, bilateral cervical facial advancement flaps and anterolateral thigh free flap reconstruction of his glossectomy defect.  Final path :  [id number] (+microscopic SALINAS, single contralateral node), superior soft tissue margin of left neck with tumor.    2/28/22-4/20/22 completed adjuvant chemoradiation with weekly cisplatin (240 mg/m2 cumulative dose)        Resolved Hospital Problems    Diagnosis Date Resolved POA    Pneumonia [J18.9] 11/20/2023 Yes         Assessment/Plan:      * Acute on chronic respiratory failure with hypoxia  Patient with Hypoxic Respiratory failure which is Acute on chronic.  he is on home oxygen at 6 LPM trach collar. Supplemental oxygen was provided and noted-   Likely in the setting of COPD exacerbation/underlying pneumonia.   CXR unremarkable.   - continue COPD treatment   - continue zosyn x7 days. Trach aspirate culture Pseudomonas  - BNP elevated, potentially CHF also contributing. Lasix 40mg IV x2 given, appears euvolemic.   Recent Labs   Lab 11/20/23  1606   BNP 1,708*     - Pulmonary is following    Pneumonia due to Pseudomonas species  Trach aspirate with Pseudomonas  - zosyn x7 days       Normocytic anemia  Patient's  anemia is currently controlled. Has not received any PRBCs to date. 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    Acute on chronic diastolic heart failure  Results for orders placed during the hospital encounter of 03/18/23    Echo    Interpretation Summary  · The left ventricle is normal in size with concentric hypertrophy and normal systolic function.  · The estimated ejection fraction is 60%. Inferobasal hypokinesis  · Indeterminate left ventricular diastolic function.  · Normal right ventricular size with normal right ventricular systolic function.  · Mild left atrial enlargement.  · Mild right atrial enlargement.  · Normal central venous pressure (3 mmHg).  · The estimated PA systolic pressure is 13 mmHg.  · The sinuses of Valsalva is mildly dilated.  BNP  Recent Labs   Lab 11/20/23  1606   BNP 1,708*       Patient does not appear to be volume overloaded on exam  - lasix 40mg IV x2 given, monitor UOP  - would hold off on lasix      PEG (percutaneous endoscopic gastrostomy) status  History noted.     Tracheostomy present  Currently has cuffless Shiley 6.0      ACP (advance care planning)  Advance Care Planning    Date: 11/21/2023  Confirmed full code status           Severe protein-calorie malnutrition  Nutrition consulted. Most recent weight and BMI monitored-     Measurements:  Wt Readings from Last 1 Encounters:   11/21/23 59 kg (130 lb)   Body mass index is 19.77 kg/m².    Patient has been screened and assessed by RD.  - resumed tube feeding     COPD exacerbation  Patient's COPD is with exacerbation noted by continued dyspnea and use of accessory muscles for breathing currently.  Patient is currently off COPD Pathway (ICU admit). Continue scheduled inhalers Steroids,  Antibiotics, and Supplemental oxygen, scheduled nebs and monitor respiratory status closely.   - On AVAPS previously, now on trach collar   - Pulmonary following  - changed solumedrol to prednisone to complete 5 days  - trach aspirate culture= Pseudomonas, continue zosyn    Other hyperlipidemia  Continue statin      Primary hypertension  BP is currently well controlled  - continue metoprolol     Coronary artery disease involving native coronary artery of native heart without angina pectoris  No acute issues.  No signs of ACS  - continue asa, statin, plavix     Squamous cell cancer of tongue  History noted. Cancer is not active. Outpatient follow up       NSVT - cardiac monitoring, replete electrolytes    VTE Risk Mitigation (From admission, onward)           Ordered     heparin (porcine) injection 5,000 Units  Every 8 hours         11/20/23 1944     IP VTE HIGH RISK PATIENT  Once         11/20/23 1944     Place sequential compression device  Until discontinued         11/20/23 1944                    Discharge Planning   NISA:      Code Status: Full Code   Is the patient medically ready for discharge?:     Reason for patient still in hospital (select all that apply): Patient trending condition  Discharge Plan A: Home with family            Critical care time spent on the evaluation and treatment of severe organ dysfunction, review of pertinent labs and imaging studies, discussions with consulting providers and discussions with patient/family: 17 minutes.      Tim Anthony MD  Department of Hospital Medicine   [facility] - Telemetry

## 2023-11-24 NOTE — TELEPHONE ENCOUNTER
Patient was contacted.  Patient's wife states that patient is in the hospital right now.  Paperwork is needed for critical illness insurance for the patient. Will wait for Monday when provider comes back to look at paperwork.

## 2023-11-24 NOTE — NURSING
Note that patient just had a 10-beat run of V-tach.  Just had a large BM per wife at bedside.     MD notified.    Will continue to f/u.

## 2023-11-24 NOTE — PLAN OF CARE
Problem: Skin Injury Risk Increased  Goal: Skin Health and Integrity  Outcome: Met  Intervention: Optimize Skin Protection  Flowsheets (Taken 11/24/2023 1713)  Pressure Reduction Techniques:   frequent weight shift encouraged   positioned off wounds   rest period provided between sit times  Pressure Reduction Devices: positioning supports utilized  Skin Protection:   adhesive use limited   incontinence pads utilized  Head of Bed (HOB) Positioning: HOB elevated

## 2023-11-24 NOTE — PLAN OF CARE
No new needs identified. Continue zosyn x 7 days. CM will assist as needed.     11/24/23 8697   Discharge Reassessment   Assessment Type Discharge Planning Reassessment   Did the patient's condition or plan change since previous assessment? Yes   Discharge Plan discussed with: Patient   Name(s) and Number(s) Cat [phone]   Discharge Plan A Home with family   Discharge Plan B Other  (tbd)   DME Needed Upon Discharge  other (see comments)  (tbd)   Transition of Care Barriers None   Why the patient remains in the hospital Requires continued medical care   Post-Acute Status   Coverage [employer]   Discharge Delays None known at this time

## 2023-11-24 NOTE — NURSING
Note that patient is non-verbal, NPO w/ PEG Tube, awake, alert and fully oriented.    Tracheostomy (Shiley) in place.   Denies pain at this time.   PEG is intact and patent by auscultation check.  No residual this am.  Patient on continuous Isosource 1.5 justa@40 mL/hr, with 30 - 50 ml water flushes. Tolerated medication via PEG well.   Does well with written communication.     All safety and communication measures continued.     Will continue to f/u.

## 2023-11-24 NOTE — NURSING
[facility], [facility] - [place]  Nurses Note -- 4 Eyes      11/24/2023       Skin assessed on: Q Shift      [x] No Pressure Injuries Present    [x]Prevention Measures Documented    [] Yes LDA  for Pressure Injury Previously documented     [] Yes New Pressure Injury Discovered   [] LDA for New Pressure Injury Added      Attending RN:  Mary Rose RN     Second RN:  KAMILAH Esteban.

## 2023-11-24 NOTE — NURSING
[facility] [facility] [place] 4 [place]      11/23/2023      Skin assessed on: Q Shift      [x] No Pressure Injuries Present    []Prevention Measures Documented    [] Yes LDA  for Pressure Injury Previously documented     [] Yes New Pressure Injury Discovered   [] LDA for New Pressure Injury Added      Attending RN:  Brenden Mina RN     Second RN:  KAMILAH Curtis        No

## 2023-11-25 LAB
ANION GAP SERPL CALC-SCNC: 14 MMOL/L (ref 8–16)
BUN SERPL-MCNC: 42 MG/DL (ref 8–23)
CALCIUM SERPL-MCNC: 10 MG/DL (ref 8.7–10.5)
CHLORIDE SERPL-SCNC: 99 MMOL/L (ref 95–110)
CO2 SERPL-SCNC: 34 MMOL/L (ref 23–29)
CREAT SERPL-MCNC: 1.1 MG/DL (ref 0.5–1.4)
EST. GFR  (NO RACE VARIABLE): >60 ML/MIN/1.73 M^2
GLUCOSE SERPL-MCNC: 132 MG/DL (ref 70–110)
POCT GLUCOSE: 146 MG/DL (ref 70–110)
POCT GLUCOSE: 149 MG/DL (ref 70–110)
POCT GLUCOSE: 153 MG/DL (ref 70–110)
POCT GLUCOSE: 154 MG/DL (ref 70–110)
POCT GLUCOSE: 160 MG/DL (ref 70–110)
POTASSIUM SERPL-SCNC: 3.4 MMOL/L (ref 3.5–5.1)
SODIUM SERPL-SCNC: 147 MMOL/L (ref 136–145)

## 2023-11-25 PROCEDURE — 21400001 HC TELEMETRY ROOM

## 2023-11-25 PROCEDURE — 25000003 PHARM REV CODE 250: Performed by: HOSPITALIST

## 2023-11-25 PROCEDURE — 27000221 HC OXYGEN, UP TO 24 HOURS

## 2023-11-25 PROCEDURE — 36415 COLL VENOUS BLD VENIPUNCTURE: CPT | Performed by: HOSPITALIST

## 2023-11-25 PROCEDURE — 94761 N-INVAS EAR/PLS OXIMETRY MLT: CPT

## 2023-11-25 PROCEDURE — 94640 AIRWAY INHALATION TREATMENT: CPT

## 2023-11-25 PROCEDURE — 63600175 PHARM REV CODE 636 W HCPCS: Performed by: HOSPITALIST

## 2023-11-25 PROCEDURE — 99900026 HC AIRWAY MAINTENANCE (STAT)

## 2023-11-25 PROCEDURE — 80048 BASIC METABOLIC PNL TOTAL CA: CPT | Performed by: HOSPITALIST

## 2023-11-25 PROCEDURE — 25000242 PHARM REV CODE 250 ALT 637 W/ HCPCS: Performed by: HOSPITALIST

## 2023-11-25 PROCEDURE — 25000003 PHARM REV CODE 250: Performed by: EMERGENCY MEDICINE

## 2023-11-25 PROCEDURE — 99900035 HC TECH TIME PER 15 MIN (STAT)

## 2023-11-25 RX ORDER — FUROSEMIDE 40 MG/1
40 TABLET ORAL 2 TIMES DAILY
Status: DISCONTINUED | OUTPATIENT
Start: 2023-11-26 | End: 2023-11-26

## 2023-11-25 RX ORDER — FUROSEMIDE 10 MG/ML
60 INJECTION INTRAMUSCULAR; INTRAVENOUS ONCE
Status: DISCONTINUED | OUTPATIENT
Start: 2023-11-25 | End: 2023-11-27

## 2023-11-25 RX ORDER — SODIUM,POTASSIUM PHOSPHATES 280-250MG
1 POWDER IN PACKET (EA) ORAL ONCE
Status: COMPLETED | OUTPATIENT
Start: 2023-11-25 | End: 2023-11-25

## 2023-11-25 RX ADMIN — FAMOTIDINE 20 MG: 10 INJECTION INTRAVENOUS at 08:11

## 2023-11-25 RX ADMIN — BUDESONIDE 0.5 MG: 0.5 INHALANT RESPIRATORY (INHALATION) at 08:11

## 2023-11-25 RX ADMIN — IPRATROPIUM BROMIDE AND ALBUTEROL SULFATE 3 ML: 2.5; .5 SOLUTION RESPIRATORY (INHALATION) at 08:11

## 2023-11-25 RX ADMIN — IPRATROPIUM BROMIDE AND ALBUTEROL SULFATE 3 ML: 2.5; .5 SOLUTION RESPIRATORY (INHALATION) at 01:11

## 2023-11-25 RX ADMIN — ASPIRIN 81 MG CHEWABLE TABLET 81 MG: 81 TABLET CHEWABLE at 10:11

## 2023-11-25 RX ADMIN — METOPROLOL TARTRATE 12.5 MG: 25 TABLET, FILM COATED ORAL at 10:11

## 2023-11-25 RX ADMIN — FLUOXETINE HYDROCHLORIDE 20 MG: 20 SOLUTION ORAL at 10:11

## 2023-11-25 RX ADMIN — PIPERACILLIN AND TAZOBACTAM 4.5 G: 4; .5 INJECTION, POWDER, LYOPHILIZED, FOR SOLUTION INTRAVENOUS; PARENTERAL at 08:11

## 2023-11-25 RX ADMIN — POTASSIUM CHLORIDE 60 MEQ: 7.46 INJECTION, SOLUTION INTRAVENOUS at 11:11

## 2023-11-25 RX ADMIN — SODIUM CHLORIDE SOLN NEBU 3% 4 ML: 3 NEBU SOLN at 01:11

## 2023-11-25 RX ADMIN — MUPIROCIN: 20 OINTMENT TOPICAL at 09:11

## 2023-11-25 RX ADMIN — HEPARIN SODIUM 5000 UNITS: 5000 INJECTION INTRAVENOUS; SUBCUTANEOUS at 05:11

## 2023-11-25 RX ADMIN — PIPERACILLIN AND TAZOBACTAM 4.5 G: 4; .5 INJECTION, POWDER, LYOPHILIZED, FOR SOLUTION INTRAVENOUS; PARENTERAL at 04:11

## 2023-11-25 RX ADMIN — ARFORMOTEROL TARTRATE 15 MCG: 15 SOLUTION RESPIRATORY (INHALATION) at 08:11

## 2023-11-25 RX ADMIN — SODIUM CHLORIDE SOLN NEBU 3% 4 ML: 3 NEBU SOLN at 08:11

## 2023-11-25 RX ADMIN — IPRATROPIUM BROMIDE AND ALBUTEROL SULFATE 3 ML: 2.5; .5 SOLUTION RESPIRATORY (INHALATION) at 07:11

## 2023-11-25 RX ADMIN — Medication 6 MG: at 09:11

## 2023-11-25 RX ADMIN — PIPERACILLIN AND TAZOBACTAM 4.5 G: 4; .5 INJECTION, POWDER, LYOPHILIZED, FOR SOLUTION INTRAVENOUS; PARENTERAL at 11:11

## 2023-11-25 RX ADMIN — BUDESONIDE 0.5 MG: 0.5 INHALANT RESPIRATORY (INHALATION) at 07:11

## 2023-11-25 RX ADMIN — HEPARIN SODIUM 5000 UNITS: 5000 INJECTION INTRAVENOUS; SUBCUTANEOUS at 01:11

## 2023-11-25 RX ADMIN — ARFORMOTEROL TARTRATE 15 MCG: 15 SOLUTION RESPIRATORY (INHALATION) at 07:11

## 2023-11-25 RX ADMIN — HEPARIN SODIUM 5000 UNITS: 5000 INJECTION INTRAVENOUS; SUBCUTANEOUS at 09:11

## 2023-11-25 RX ADMIN — Medication 50 MG: at 10:11

## 2023-11-25 RX ADMIN — CLOPIDOGREL BISULFATE 75 MG: 75 TABLET ORAL at 09:11

## 2023-11-25 RX ADMIN — IPRATROPIUM BROMIDE AND ALBUTEROL SULFATE 3 ML: 2.5; .5 SOLUTION RESPIRATORY (INHALATION) at 12:11

## 2023-11-25 RX ADMIN — ATORVASTATIN CALCIUM 40 MG: 40 TABLET, FILM COATED ORAL at 10:11

## 2023-11-25 RX ADMIN — FOLIC ACID 1 MG: 1 TABLET ORAL at 10:11

## 2023-11-25 RX ADMIN — PREDNISONE 40 MG: 20 TABLET ORAL at 10:11

## 2023-11-25 RX ADMIN — MUPIROCIN: 20 OINTMENT TOPICAL at 10:11

## 2023-11-25 RX ADMIN — SODIUM CHLORIDE SOLN NEBU 3% 4 ML: 3 NEBU SOLN at 07:11

## 2023-11-25 RX ADMIN — Medication 1 PACKET: at 06:11

## 2023-11-25 RX ADMIN — METOPROLOL TARTRATE 12.5 MG: 25 TABLET, FILM COATED ORAL at 09:11

## 2023-11-25 NOTE — NURSING
[facility], [facility]  Nurses Note -- 4 Eyes      11/25/2023       Skin assessed on: Q Shift      [x] No Pressure Injuries Present    [x]Prevention Measures Documented    [] Yes LDA  for Pressure Injury Previously documented     [] Yes New Pressure Injury Discovered   [] LDA for New Pressure Injury Added      Attending RN:  Mary Rose RN     Second RN:  KAMILAH Esteban

## 2023-11-25 NOTE — PROGRESS NOTES
[facility]  Progress Note    Patient Name: Fareed Richard Jr.  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     Patient presents with worsening shortness of breath that started 3 days prior to presentation.  He additionally reports worsening secretion from his tracheostomy.  Additional symptoms include wheezing.    In the ED, the patient was tachycardic (110), tachypneic and hypoxic (SpO2:  85%), and was placed on BiPAP.  Labs were remarkable for an elevated BNP (1708), elevated troponin (0.030).  VBG showed a pH of 7.36, pCO2 of 60.3, HC03 of 35.8.  Chest x-ray showed no significant change from prior studies.  Patient was given Lasix 40 mg IV, Solu-Medrol 80 mg IV, vancomycin and Zosyn.  He was admitted for further management.    Overview/Hospital Course:  Mr Fareed Richard Jr. Was admitted with acute hypoxic respiratory failure secondary to Pseudomonas PNA. Started BiPAP, zosyn, and admitted to ICU. Improving, weaned to trach collar.     No new subjective & objective note has been filed under this hospital service since the last note was generated.      Interval History: No overnights complaint.         Objective:        Vital Signs Range (Last 24H):  Temp:  [97.5 °F (36.4 °C)-97.8 °F (36.6 °C)]   Pulse:  [67-84]   Resp:  [16-20]   BP: (103-133)/(53-76)   SpO2:  [92 %-98 %] Body mass index is 18.97 kg/m².    I & O (Last 24H):  Intake/Output Summary (Last 24 hours) at 11/25/2023 1032  Last data filed at 11/25/2023 0716  Gross per 24 hour   Intake  1110 ml   Output 350 ml   Net 760 ml         PHYSICAL EXAMINATION:  Vitals and nursing note reviewed. Relatively normal vitals  GENERAL: NAD, alert and O x 3, well-developed, cachetic  HEENT: Head is normocephalic and atraumatic. Extraocular muscles are intact. Pupils are equal, round, and reactive to light and accommodation. Nares appeared normal. Mouth is without lesions. Mucous membranes are moist. no nystagmus Tracheostomy (Shiley) in place.   Physical Exam  Pulmonary:      Effort: No tachypnea, bradypnea, accessory muscle usage, prolonged expiration or retractions.      Breath sounds: Transmitted upper airway sounds present.         CV:  no obvious JVD  ABDOMEN:  nondistended.  No guarding  BACK: FROM, neg SLR  EXTREMITIES: TOLEDO x 4, FROM, no obvious swelling, no pedal edema  SKIN: warm, dry, intact, no rash/lesions, no pallor  NEUROLOGIC: GCS 15,  Cranial nerves II through XII are grossly intact. No obvious motor deficit  PSYCH: nl attitude, nl mood/affect, nl speech, nl thought process/form, no SI/HI, no AH/VH      Scheduled Meds:   albuterol-ipratropium  3 mL Nebulization Q6H    budesonide  0.5 mg Nebulization Q12H    And    arformoteroL  15 mcg Nebulization BID    aspirin  81 mg Oral Daily    atorvastatin  40 mg Per G Tube Daily    clopidogreL  75 mg Oral Nightly    famotidine (PF)  20 mg Intravenous Daily    FLUoxetine  20 mg Oral Daily    folic acid  1 mg Oral Daily    heparin (porcine)  5,000 Units Subcutaneous Q8H    metoprolol tartrate  12.5 mg Oral BID    mupirocin   Nasal BID    piperacillin-tazobactam (Zosyn) IV (PEDS and ADULTS) (extended infusion is not appropriate)  4.5 g Intravenous Q8H    sodium chloride 3%  4 mL Nebulization Q6H WAKE    thiamine  50 mg Oral Daily     Continuous Infusions:  PRN Meds:.acetaminophen, albuterol-ipratropium, calcium gluconate IVPB, calcium gluconate IVPB, calcium gluconate IVPB, dextrose 10%, dextrose 10%, glucagon (human recombinant), hydrOXYzine HCL, insulin  aspart U-100, magnesium sulfate IVPB, magnesium sulfate IVPB, melatonin, ondansetron, potassium chloride **AND** potassium chloride **AND** potassium chloride, prochlorperazine, sodium chloride 0.9%, sodium phosphate 15 mmol in dextrose 5 % (D5W) 250 mL IVPB, sodium phosphate 20.01 mmol in dextrose 5 % (D5W) 250 mL IVPB, sodium phosphate 30 mmol in dextrose 5 % (D5W) 250 mL IVPB    Diagnostic Results:  Lab Results   Component Value Date    WBC 7.00 11/24/2023    HGB 11.1 (L) 11/24/2023    HCT 36.5 (L) 11/24/2023    MCV 92 11/24/2023     11/24/2023         Lab Results   Component Value Date    INR 1.0 04/02/2023    INR 1.2 03/27/2023    INR 1.0 03/20/2023     Lab Results   Component Value Date    HGBA1C 5.8 (H) 08/29/2023     Recent Labs     11/23/23  1227 11/23/23  1604 11/23/23  2352 11/24/23  0643 11/24/23  0730 11/25/23  0002 11/25/23  0554 11/25/23  0725   POCTGLUCOSE 123* 179* 215* 143* 144* 149* 154* 146*       11/24/23 abdomen view Impression: No acute process seen.  11/24/23 CXR Impression: Improvement from 11/20/2023 but there may be some mild CHF.  ASSESSMENT/PLAN:     List of  Current Problems:  Active Hospital Problems    Diagnosis  POA    *Acute on chronic respiratory failure with hypoxia [J96.21]  Yes    Pneumonia due to Pseudomonas species [J15.1]  Yes    Normocytic anemia [D64.9]  Yes    Acute on chronic diastolic heart failure [I50.33]  Yes    Tracheostomy present [Z93.0]  Not Applicable     Tracheotomy at time of glossectomy by Dr. Smalls in January 2022.  Routine trach changes in ENT clinic since that time.    Most recent trach change on 10/9/2023 => currently with uncuffed 7.5mm inner diameter tracheostomy tube in place.        PEG (percutaneous endoscopic gastrostomy) status [Z93.1]  Not Applicable    ACP (advance care planning) [Z71.89]  Not Applicable    Severe protein-calorie malnutrition [E43]  Yes    COPD exacerbation [J44.1]  Yes    Primary hypertension [I10]  Yes    Other  hyperlipidemia [E78.49]  Yes    Coronary artery disease involving native coronary artery of native heart without angina pectoris [I25.10]  Yes    Squamous cell cancer of tongue [C02.9]  Yes     12/15/21 FNA left neck mass : squamous cell carcinoma, p16 negative  1/4/22 total glossectomy, bilateral neck dissection, bilateral cervical facial advancement flaps and anterolateral thigh free flap reconstruction of his glossectomy defect.  Final path :  [id number] (+microscopic SALINAS, single contralateral node), superior soft tissue margin of left neck with tumor.    2/28/22-4/20/22 completed adjuvant chemoradiation with weekly cisplatin (240 mg/m2 cumulative dose)        Resolved Hospital Problems    Diagnosis Date Resolved POA    Pneumonia [J18.9] 11/20/2023 Yes         Assessment/Plan:   Replete electrolytes. Trial of lasix. Day 5 of 7 for zosyn        * Acute on chronic respiratory failure with hypoxia  Patient with Hypoxic Respiratory failure which is Acute on chronic.  he is on home oxygen at 6 LPM trach collar. Supplemental oxygen was provided and noted-   Likely in the setting of COPD exacerbation/underlying pneumonia.   CXR unremarkable.   - continue COPD treatment   - continue zosyn x7 days. Trach aspirate culture Pseudomonas  - BNP elevated, potentially CHF also contributing. Lasix 40mg IV x2 given, appears euvolemic.   Recent Labs   Lab 11/20/23  1606   BNP 1,708*     - Pulmonary is following    Pneumonia due to Pseudomonas species  Trach aspirate with Pseudomonas  - zosyn x7 days       Normocytic anemia  Patient's anemia is currently controlled. Has not received any PRBCs to date. 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    Acute on chronic diastolic heart failure  Results  for orders placed during the hospital encounter of 03/18/23    Echo    Interpretation Summary  · The left ventricle is normal in size with concentric hypertrophy and normal systolic function.  · The estimated ejection fraction is 60%. Inferobasal hypokinesis  · Indeterminate left ventricular diastolic function.  · Normal right ventricular size with normal right ventricular systolic function.  · Mild left atrial enlargement.  · Mild right atrial enlargement.  · Normal central venous pressure (3 mmHg).  · The estimated PA systolic pressure is 13 mmHg.  · The sinuses of Valsalva is mildly dilated.  BNP  Recent Labs   Lab 11/20/23  1606   BNP 1,708*       Patient does not appear to be volume overloaded on exam  - lasix 40mg IV x2 given, monitor UOP  - would hold off on lasix      PEG (percutaneous endoscopic gastrostomy) status  History noted.     Tracheostomy present  Currently has cuffless Shiley 6.0      ACP (advance care planning)  Advance Care Planning    Date: 11/21/2023  Confirmed full code status           Severe protein-calorie malnutrition  Nutrition consulted. Most recent weight and BMI monitored-     Measurements:  Wt Readings from Last 1 Encounters:   11/21/23 59 kg (130 lb)   Body mass index is 19.77 kg/m².    Patient has been screened and assessed by RD.  - resumed tube feeding     COPD exacerbation  Patient's COPD is with exacerbation noted by continued dyspnea and use of accessory muscles for breathing currently.  Patient is currently off COPD Pathway (ICU admit). Continue scheduled inhalers Steroids, Antibiotics, and Supplemental oxygen, scheduled nebs and monitor respiratory status closely.   - On AVAPS previously, now on trach collar   - Pulmonary following  - changed solumedrol to prednisone to complete 5 days  - trach aspirate culture= Pseudomonas, continue zosyn    Other hyperlipidemia  Continue statin      Primary hypertension  BP is currently well controlled  - continue metoprolol     Coronary  artery disease involving native coronary artery of native heart without angina pectoris  No acute issues.  No signs of ACS  - continue asa, statin, plavix     Squamous cell cancer of tongue  History noted. Cancer is not active. Outpatient follow up           VTE Risk Mitigation (From admission, onward)           Ordered     heparin (porcine) injection 5,000 Units  Every 8 hours         11/20/23 1944     IP VTE HIGH RISK PATIENT  Once         11/20/23 1944     Place sequential compression device  Until discontinued         11/20/23 1944                    Discharge Planning   NISA:  11/27/23    Code Status: Full Code   Is the patient medically ready for discharge?:     Reason for patient still in hospital (select all that apply): Patient trending condition  Discharge Plan A: Home with family   Discharge Delays: None known at this time      Tim Anthony MD  Department of Hospital Medicine   [facility]

## 2023-11-25 NOTE — NURSING
[facility] [place]  Nurses Note -- 4 Eyes      11/24/2023       Skin assessed on: Q Shift      [] No Pressure Injuries Present    []Prevention Measures Documented    [] Yes LDA  for Pressure Injury Previously documented     [] Yes New Pressure Injury Discovered   [] LDA for New Pressure Injury Added      Attending RN:  Brenden Mina RN     Second RN:  KAMILAH Hernandez

## 2023-11-25 NOTE — NURSING
Note that patient is alert, awake and fully oriented.  Requested assistance via call light, with adult brief change as he had a moderate, loose, dark green BM.  With one-person assistance, patient able to turn himself in bed from side to side.  Protective Mepliex to sacral area.     Encouraged pt to continue use of call light for his needs.     Will continue to f/u.     08:53 - Patient requested up out of bed to chair.  Patient able to maneuver to chair with one-person stand-by. Did well with following directives.     Will continue to f/u.

## 2023-11-26 PROBLEM — R53.81 PHYSICAL DECONDITIONING: Status: ACTIVE | Noted: 2023-11-26

## 2023-11-26 LAB
ANION GAP SERPL CALC-SCNC: 14 MMOL/L (ref 8–16)
APTT PPP: 23.6 SEC (ref 21–32)
BASOPHILS # BLD AUTO: 0.02 K/UL (ref 0–0.2)
BASOPHILS NFR BLD: 0.3 % (ref 0–1.9)
BUN SERPL-MCNC: 41 MG/DL (ref 8–23)
CALCIUM SERPL-MCNC: 10.1 MG/DL (ref 8.7–10.5)
CHLORIDE SERPL-SCNC: 102 MMOL/L (ref 95–110)
CO2 SERPL-SCNC: 33 MMOL/L (ref 23–29)
CREAT SERPL-MCNC: 1.2 MG/DL (ref 0.5–1.4)
DIFFERENTIAL METHOD BLD: ABNORMAL
EOSINOPHIL # BLD AUTO: 0 K/UL (ref 0–0.5)
EOSINOPHIL NFR BLD: 0.3 % (ref 0–8)
ERYTHROCYTE [DISTWIDTH] IN BLOOD BY AUTOMATED COUNT: 13.8 % (ref 11.5–14.5)
EST. GFR  (NO RACE VARIABLE): >60 ML/MIN/1.73 M^2
GLUCOSE SERPL-MCNC: 143 MG/DL (ref 70–110)
HCT VFR BLD AUTO: 38.9 % (ref 40–54)
HGB BLD-MCNC: 11.4 G/DL (ref 14–18)
IMM GRANULOCYTES # BLD AUTO: 0.04 K/UL (ref 0–0.04)
IMM GRANULOCYTES NFR BLD AUTO: 0.5 % (ref 0–0.5)
INR PPP: 1.1 (ref 0.8–1.2)
LYMPHOCYTES # BLD AUTO: 0.7 K/UL (ref 1–4.8)
LYMPHOCYTES NFR BLD: 8.6 % (ref 18–48)
MCH RBC QN AUTO: 27.7 PG (ref 27–31)
MCHC RBC AUTO-ENTMCNC: 29.3 G/DL (ref 32–36)
MCV RBC AUTO: 94 FL (ref 82–98)
MONOCYTES # BLD AUTO: 0.7 K/UL (ref 0.3–1)
MONOCYTES NFR BLD: 8.9 % (ref 4–15)
NEUTROPHILS # BLD AUTO: 6.3 K/UL (ref 1.8–7.7)
NEUTROPHILS NFR BLD: 81.4 % (ref 38–73)
NRBC BLD-RTO: 0 /100 WBC
PLATELET # BLD AUTO: 234 K/UL (ref 150–450)
PMV BLD AUTO: 10 FL (ref 9.2–12.9)
POCT GLUCOSE: 168 MG/DL (ref 70–110)
POCT GLUCOSE: 171 MG/DL (ref 70–110)
POCT GLUCOSE: 195 MG/DL (ref 70–110)
POTASSIUM SERPL-SCNC: 3.4 MMOL/L (ref 3.5–5.1)
PROTHROMBIN TIME: 11.4 SEC (ref 9–12.5)
RBC # BLD AUTO: 4.12 M/UL (ref 4.6–6.2)
SODIUM SERPL-SCNC: 149 MMOL/L (ref 136–145)
WBC # BLD AUTO: 7.78 K/UL (ref 3.9–12.7)

## 2023-11-26 PROCEDURE — 80048 BASIC METABOLIC PNL TOTAL CA: CPT | Performed by: HOSPITALIST

## 2023-11-26 PROCEDURE — 94761 N-INVAS EAR/PLS OXIMETRY MLT: CPT

## 2023-11-26 PROCEDURE — 85025 COMPLETE CBC W/AUTO DIFF WBC: CPT | Performed by: NURSE PRACTITIONER

## 2023-11-26 PROCEDURE — 25000242 PHARM REV CODE 250 ALT 637 W/ HCPCS: Performed by: HOSPITALIST

## 2023-11-26 PROCEDURE — 94640 AIRWAY INHALATION TREATMENT: CPT

## 2023-11-26 PROCEDURE — 25000003 PHARM REV CODE 250: Performed by: HOSPITALIST

## 2023-11-26 PROCEDURE — 36415 COLL VENOUS BLD VENIPUNCTURE: CPT | Performed by: HOSPITALIST

## 2023-11-26 PROCEDURE — 27000221 HC OXYGEN, UP TO 24 HOURS

## 2023-11-26 PROCEDURE — 63600175 PHARM REV CODE 636 W HCPCS: Performed by: HOSPITALIST

## 2023-11-26 PROCEDURE — 85610 PROTHROMBIN TIME: CPT | Performed by: NURSE PRACTITIONER

## 2023-11-26 PROCEDURE — 99900035 HC TECH TIME PER 15 MIN (STAT)

## 2023-11-26 PROCEDURE — 63600175 PHARM REV CODE 636 W HCPCS: Performed by: EMERGENCY MEDICINE

## 2023-11-26 PROCEDURE — 99900026 HC AIRWAY MAINTENANCE (STAT)

## 2023-11-26 PROCEDURE — 21400001 HC TELEMETRY ROOM

## 2023-11-26 PROCEDURE — 85730 THROMBOPLASTIN TIME PARTIAL: CPT | Performed by: NURSE PRACTITIONER

## 2023-11-26 RX ORDER — FUROSEMIDE 40 MG/1
40 TABLET ORAL 2 TIMES DAILY
Status: DISCONTINUED | OUTPATIENT
Start: 2023-11-27 | End: 2023-11-26

## 2023-11-26 RX ADMIN — IPRATROPIUM BROMIDE AND ALBUTEROL SULFATE 3 ML: 2.5; .5 SOLUTION RESPIRATORY (INHALATION) at 07:11

## 2023-11-26 RX ADMIN — CLOPIDOGREL BISULFATE 75 MG: 75 TABLET ORAL at 09:11

## 2023-11-26 RX ADMIN — ACETAMINOPHEN 650 MG: 325 TABLET ORAL at 05:11

## 2023-11-26 RX ADMIN — SODIUM CHLORIDE SOLN NEBU 3% 4 ML: 3 NEBU SOLN at 07:11

## 2023-11-26 RX ADMIN — ASPIRIN 81 MG CHEWABLE TABLET 81 MG: 81 TABLET CHEWABLE at 10:11

## 2023-11-26 RX ADMIN — ATORVASTATIN CALCIUM 40 MG: 40 TABLET, FILM COATED ORAL at 10:11

## 2023-11-26 RX ADMIN — Medication 50 MG: at 10:11

## 2023-11-26 RX ADMIN — ARFORMOTEROL TARTRATE 15 MCG: 15 SOLUTION RESPIRATORY (INHALATION) at 07:11

## 2023-11-26 RX ADMIN — IPRATROPIUM BROMIDE AND ALBUTEROL SULFATE 3 ML: 2.5; .5 SOLUTION RESPIRATORY (INHALATION) at 01:11

## 2023-11-26 RX ADMIN — SODIUM CHLORIDE SOLN NEBU 3% 4 ML: 3 NEBU SOLN at 08:11

## 2023-11-26 RX ADMIN — SODIUM CHLORIDE SOLN NEBU 3% 4 ML: 3 NEBU SOLN at 02:11

## 2023-11-26 RX ADMIN — FOLIC ACID 1 MG: 1 TABLET ORAL at 10:11

## 2023-11-26 RX ADMIN — IPRATROPIUM BROMIDE AND ALBUTEROL SULFATE 3 ML: 2.5; .5 SOLUTION RESPIRATORY (INHALATION) at 02:11

## 2023-11-26 RX ADMIN — IPRATROPIUM BROMIDE AND ALBUTEROL SULFATE 3 ML: 2.5; .5 SOLUTION RESPIRATORY (INHALATION) at 08:11

## 2023-11-26 RX ADMIN — BUDESONIDE 0.5 MG: 0.5 INHALANT RESPIRATORY (INHALATION) at 08:11

## 2023-11-26 RX ADMIN — METOPROLOL TARTRATE 12.5 MG: 25 TABLET, FILM COATED ORAL at 10:11

## 2023-11-26 RX ADMIN — PIPERACILLIN AND TAZOBACTAM 4.5 G: 4; .5 INJECTION, POWDER, LYOPHILIZED, FOR SOLUTION INTRAVENOUS; PARENTERAL at 11:11

## 2023-11-26 RX ADMIN — ARFORMOTEROL TARTRATE 15 MCG: 15 SOLUTION RESPIRATORY (INHALATION) at 08:11

## 2023-11-26 RX ADMIN — Medication 6 MG: at 10:11

## 2023-11-26 RX ADMIN — FAMOTIDINE 20 MG: 10 INJECTION INTRAVENOUS at 08:11

## 2023-11-26 RX ADMIN — FLUOXETINE HYDROCHLORIDE 20 MG: 20 SOLUTION ORAL at 10:11

## 2023-11-26 RX ADMIN — PIPERACILLIN AND TAZOBACTAM 4.5 G: 4; .5 INJECTION, POWDER, LYOPHILIZED, FOR SOLUTION INTRAVENOUS; PARENTERAL at 09:11

## 2023-11-26 RX ADMIN — BUDESONIDE 0.5 MG: 0.5 INHALANT RESPIRATORY (INHALATION) at 07:11

## 2023-11-26 RX ADMIN — METOPROLOL TARTRATE 12.5 MG: 25 TABLET, FILM COATED ORAL at 09:11

## 2023-11-26 RX ADMIN — PIPERACILLIN AND TAZOBACTAM 4.5 G: 4; .5 INJECTION, POWDER, LYOPHILIZED, FOR SOLUTION INTRAVENOUS; PARENTERAL at 04:11

## 2023-11-26 NOTE — ASSESSMENT & PLAN NOTE
Patient with Hypoxic Respiratory failure which is Acute on chronic.  he is on home oxygen at 6 LPM trach collar. Supplemental oxygen was provided and noted-   Likely in the setting of COPD exacerbation/underlying pneumonia.   -Seen by ICU and Pulmonologist - stepped down to floor 11/24  -CXR unremarkable.   -Trach aspirate culture Pseudomonas  -Continue zosyn day 6 of 7  -Continue PO prednisone, duoneb/NS 3%  -RT weaning oxygen accordingly to keep O2 88-92%

## 2023-11-26 NOTE — NURSING
[facility]  Nurses Note -- 4 Eyes      11/26/2023       Skin assessed on: Q Shift      [x] No Pressure Injuries Present    [x]Prevention Measures Documented    [] Yes LDA  for Pressure Injury Previously documented     [] Yes New Pressure Injury Discovered   [] LDA for New Pressure Injury Added      Attending RN:  Mary Rose RN     Second RN:  KAMILAH Esteban

## 2023-11-26 NOTE — ASSESSMENT & PLAN NOTE
Patient has hypernatremia which is uncontrolled. The hypernatremia is due to Dehydration. We will aim to correct the sodium by 8-10mEq in 24 hours. We will correct their hypernatremia with  The patient's sodium results have been reviewed and are listed below.  Recent Labs   Lab 11/26/23  0505   *   Add free water 250 ml QID

## 2023-11-26 NOTE — PLAN OF CARE
Problem: Fall Injury Risk  Goal: Absence of Fall and Fall-Related Injury  Outcome: Met  Intervention: Identify and Manage Contributors  Flowsheets (Taken 11/26/2023 1407)  Self-Care Promotion:   BADL personal objects within reach   BADL personal routines maintained  Medication Review/Management:   medications reviewed   high-risk medications identified  Intervention: Promote Injury-Free Environment  Flowsheets (Taken 11/26/2023 1407)  Safety Promotion/Fall Prevention:   assistive device/personal item within reach   bed alarm set   Fall Risk reviewed with patient/family   high risk medications identified   medications reviewed   room near unit station

## 2023-11-26 NOTE — PROGRESS NOTES
[facility]  Progress Note    Patient Name: Fareed Richard Jr.  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     Patient presents with worsening shortness of breath that started 3 days prior to presentation.  He additionally reports worsening secretion from his tracheostomy.  Additional symptoms include wheezing.    In the ED, the patient was tachycardic (110), tachypneic and hypoxic (SpO2:  85%), and was placed on BiPAP.  Labs were remarkable for an elevated BNP (1708), elevated troponin (0.030).  VBG showed a pH of 7.36, pCO2 of 60.3, HC03 of 35.8.  Chest x-ray showed no significant change from prior studies.  Patient was given Lasix 40 mg IV, Solu-Medrol 80 mg IV, vancomycin and Zosyn.  He was admitted for further management.    Overview/Hospital Course:  Mr Fareed Richard Jr. Was admitted with acute hypoxic respiratory failure secondary to Pseudomonas PNA and COPD exacerbation. On oxygen 6 L trach collar and tube feeding at home. Started BiPAP, zosyn, and admitted to ICU. Stepped down to floor 11/24. Lung coarse and still requiring 8 L oxygen. Continue zoysn (day 6 of 7), duoneb/NS 3% neb, PO prednisone (day 4 or 5). RT following closely and wean to keep 88-90%.  Tolerating TF at goal 40 cc/hr isosource. PT/OT evaluation completed. Low intensity on discharge. Hopefully home with wife in 2-3 days.     Interval History: able to write down needs. No complaints at this time. RT at bedside . Strong cough.     Review of Systems   HENT:  Negative for sore throat.     Respiratory:  Positive for cough and shortness of breath.    Cardiovascular:  Negative for chest pain.   Gastrointestinal:  Negative for abdominal pain.     Objective:     Vital Signs (Most Recent):  Temp: 97.6 °F (36.4 °C) (11/26/23 0757)  Pulse: 65 (11/26/23 0816)  Resp: 18 (11/26/23 0816)  BP: 116/75 (11/26/23 0757)  SpO2: 96 % (11/26/23 0816) Vital Signs (24h Range):  Temp:  [97.6 °F (36.4 °C)-98.9 °F (37.2 °C)] 97.6 °F (36.4 °C)  Pulse:  [62-85] 65  Resp:  [18-24] 18  SpO2:  [93 %-97 %] 96 %  BP: (115-147)/(69-82) 116/75     Weight: 56.6 kg (124 lb 12.5 oz)  Body mass index is 18.97 kg/m².    Intake/Output Summary (Last 24 hours) at 11/26/2023 0842  Last data filed at 11/26/2023 0636  Gross per 24 hour   Intake 634 ml   Output --   Net 634 ml         Physical Exam  Vitals and nursing note reviewed.   Constitutional:       General: He is not in acute distress.     Appearance: He is ill-appearing. He is not toxic-appearing.      Comments: Thin man   HENT:      Nose:      Comments: Scab on nose     Mouth/Throat:      Comments: Edentulous.   Neck:      Comments: Cuffless Trach in place  Cardiovascular:      Rate and Rhythm: Normal rate and regular rhythm.      Pulses: Normal pulses.      Heart sounds: Normal heart sounds.   Pulmonary:      Effort: Pulmonary effort is normal. No respiratory distress.      Breath sounds: Rales present. No rhonchi.      Comments: On trach collar 8 L currently  Coarse     Abdominal:      General: Bowel sounds are normal. There is no distension.      Palpations: Abdomen is soft.      Tenderness: There is no abdominal tenderness. There is no guarding.      Comments: PEG LUQ,no redness or sign of infection at insertion site   Musculoskeletal:      Right lower leg: No edema.      Left lower leg: No edema.   Skin:     General: Skin is warm and dry.   Neurological:      Mental Status: He is alert. Mental status is at baseline.      Comments: Awake and alert  Able write down needs              Significant Labs: All pertinent labs within the past 24 hours have been reviewed.    Significant Imaging: I have reviewed all pertinent imaging results/findings within the past 24 hours.    Assessment/Plan:      * Acute on chronic respiratory failure with hypoxia  Patient with Hypoxic Respiratory failure which is Acute on chronic.  he is on home oxygen at 6 LPM trach collar. Supplemental oxygen was provided and noted-   Likely in the setting of COPD exacerbation/underlying pneumonia.   -Seen by ICU and Pulmonologist - stepped down to floor 11/24  -CXR unremarkable.   -Trach aspirate culture Pseudomonas  -Continue zosyn day 6 of 7  -Continue PO prednisone, duoneb/NS 3%  -RT weaning oxygen accordingly to keep O2 88-92%    COPD exacerbation  Patient's COPD is with exacerbation noted by continued dyspnea and use of accessory muscles for breathing currently.  Patient is currently off COPD Pathway (ICU admit). Continue scheduled inhalers Steroids, Antibiotics, and Supplemental oxygen, scheduled nebs and monitor respiratory status closely.   - On AVAPS previously on admission , now on trach collar   - Pulmonary following  - changed solumedrol to prednisone to complete 5 days -day 4 or 5  - trach aspirate culture= Pseudomonas, continue zosyn -day 6 of 7     Pneumonia due to Pseudomonas species  Trach aspirate with Pseudomonas  - zosyn x7 days - day 6 of 7      Physical deconditioning  PT/OT following  Low intensity on discharge      Normocytic anemia  Patient's anemia is currently controlled. Has not received any PRBCs to date. 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    Acute on chronic diastolic heart failure  Results for orders placed during the hospital encounter of  03/18/23    Echo    Interpretation Summary  · The left ventricle is normal in size with concentric hypertrophy and normal systolic function.  · The estimated ejection fraction is 60%. Inferobasal hypokinesis  · Indeterminate left ventricular diastolic function.  · Normal right ventricular size with normal right ventricular systolic function.  · Mild left atrial enlargement.  · Mild right atrial enlargement.  · Normal central venous pressure (3 mmHg).  · The estimated PA systolic pressure is 13 mmHg.  · The sinuses of Valsalva is mildly dilated.  BNP  Recent Labs   Lab 11/20/23  1606   BNP 1,708*       Patient does not appear to be volume overloaded on exam  - lasix 40mg IV x2 given, monitor UOP  - would hold off on lasix      PEG (percutaneous endoscopic gastrostomy) status  History noted.     Tracheostomy present  Currently has cuffless Shiley 6.0      ACP (advance care planning)  Advance Care Planning    Date: 11/21/2023  Confirmed full code status           Hypernatremia  Patient has hypernatremia which is uncontrolled. The hypernatremia is due to Dehydration. We will aim to correct the sodium by 8-10mEq in 24 hours. We will correct their hypernatremia with  The patient's sodium results have been reviewed and are listed below.  Recent Labs   Lab 11/26/23  0505   *   Add free water 250 ml QID    Severe protein-calorie malnutrition  Nutrition consulted. Most recent weight and BMI monitored-   Measurements:  Wt Readings from Last 1 Encounters:   11/25/23 56.6 kg (124 lb 12.5 oz)   Body mass index is 18.97 kg/m².  RD following - at goal 40 ml/hr isosource 1.7  Add additional free water as Na 149     Other hyperlipidemia  Continue statin      Primary hypertension  BP is currently well controlled  - continue metoprolol     Coronary artery disease involving native coronary artery of native heart without angina pectoris  No acute issues.  No signs of ACS  - continue asa, statin, plavix     Squamous cell cancer of  tongue  History noted. Cancer is not active. Outpatient follow up         VTE Risk Mitigation (From admission, onward)           Ordered     IP VTE HIGH RISK PATIENT  Once         11/20/23 1944     Place sequential compression device  Until discontinued         11/20/23 1944                    Discharge Planning   NISA:      Code Status: Full Code   Is the patient medically ready for discharge?:     Reason for patient still in hospital (select all that apply): Treatment  Discharge Plan A: Home with family   Discharge Delays: None known at this time          Myesha Altamirano NP  Department of Hospital Medicine   [facility]

## 2023-11-26 NOTE — NURSING
Pt noted to be bleeding in RLQ following heparin injection. Small oozing. Pressure applied to site. Abd clean and gauze and tegaderm applied to site to monitor bleeding.   0112- Pt continued to bleed.Charge nurse informed. dressing changed surgi-seal applied to abdomen, with gauze and tegaderm.   0220- Pt continued to bleed. Charge nurse to get pressure dressing. GIUSEPPE Castaneda Np notified of bleeding and interventions. VS charted on flowsheet. Pt Alert and oriented. Order given to d/c heparin and CBC stat. Will update provider on status of pressure dressing once applied.

## 2023-11-26 NOTE — NURSING
[facility]  Nurses Note -- 4 Eyes      11/25/2023       Skin assessed on: Q Shift      [x] No Pressure Injuries Present    [x]Prevention Measures Documented    [] Yes LDA  for Pressure Injury Previously documented     [] Yes New Pressure Injury Discovered   [] LDA for New Pressure Injury Added      Attending RN:  Brenden Mina RN     Second RN:  KAMILAH Hernandez

## 2023-11-26 NOTE — CARE UPDATE
Patient has ongoing bleed to left lower abdominal heparin subq injection site.  No signs of distress, vital signs stable, cbc stable.  Pressure dressing in place. Heparin discontinued.  Nursing to notify of any abdominal or flank tenderness or swollen area. Coags WNL.  Will monitor labs.

## 2023-11-26 NOTE — NURSING
NPO  Removed pressure dressing from RLQ per order.   Site continues with scant oozing of blood.   Dressed with gauze dressing.   No redness or swelling observed to site.     Patient up out of bed with two-person assistance.   Continues with PEG Tube feedings, condom cath, and with written communication.     All safety measures maintained.   Will continue to f/u.

## 2023-11-26 NOTE — NURSING
GIUSEPPE Castaneda NP at bedside assessing pt. Additional labs ordered. Call placed to lab to draw labs. Puncture site redressed with gauze, tegaderm and pressure dressing. Plan of care ongoing.

## 2023-11-26 NOTE — ASSESSMENT & PLAN NOTE
Nutrition consulted. Most recent weight and BMI monitored-   Measurements:  Wt Readings from Last 1 Encounters:   11/25/23 56.6 kg (124 lb 12.5 oz)   Body mass index is 18.97 kg/m².  RD following - at goal 40 ml/hr isosource 1.7  Add additional free water as Na 149

## 2023-11-26 NOTE — ASSESSMENT & PLAN NOTE
Patient's COPD is with exacerbation noted by continued dyspnea and use of accessory muscles for breathing currently.  Patient is currently off COPD Pathway (ICU admit). Continue scheduled inhalers Steroids, Antibiotics, and Supplemental oxygen, scheduled nebs and monitor respiratory status closely.   - On AVAPS previously on admission , now on trach collar   - Pulmonary following  - changed solumedrol to prednisone to complete 5 days -day 4 or 5  - trach aspirate culture= Pseudomonas, continue zosyn -day 6 of 7

## 2023-11-26 NOTE — SUBJECTIVE & OBJECTIVE
Interval History: able to write down needs. No complaints at this time. RT at bedside . Strong cough.     Review of Systems   HENT:  Negative for sore throat.    Respiratory:  Positive for cough and shortness of breath.    Cardiovascular:  Negative for chest pain.   Gastrointestinal:  Negative for abdominal pain.     Objective:     Vital Signs (Most Recent):  Temp: 97.6 °F (36.4 °C) (11/26/23 0757)  Pulse: 65 (11/26/23 0816)  Resp: 18 (11/26/23 0816)  BP: 116/75 (11/26/23 0757)  SpO2: 96 % (11/26/23 0816) Vital Signs (24h Range):  Temp:  [97.6 °F (36.4 °C)-98.9 °F (37.2 °C)] 97.6 °F (36.4 °C)  Pulse:  [62-85] 65  Resp:  [18-24] 18  SpO2:  [93 %-97 %] 96 %  BP: (115-147)/(69-82) 116/75     Weight: 56.6 kg (124 lb 12.5 oz)  Body mass index is 18.97 kg/m².    Intake/Output Summary (Last 24 hours) at 11/26/2023 0842  Last data filed at 11/26/2023 0636  Gross per 24 hour   Intake 634 ml   Output --   Net 634 ml         Physical Exam  Vitals and nursing note reviewed.   Constitutional:       General: He is not in acute distress.     Appearance: He is ill-appearing. He is not toxic-appearing.      Comments: Thin man   HENT:      Nose:      Comments: Scab on nose     Mouth/Throat:      Comments: Edentulous.   Neck:      Comments: Cuffless Trach in place  Cardiovascular:      Rate and Rhythm: Normal rate and regular rhythm.      Pulses: Normal pulses.      Heart sounds: Normal heart sounds.   Pulmonary:      Effort: Pulmonary effort is normal. No respiratory distress.      Breath sounds: Rales present. No rhonchi.      Comments: On trach collar 8 L currently  Coarse     Abdominal:      General: Bowel sounds are normal. There is no distension.      Palpations: Abdomen is soft.      Tenderness: There is no abdominal tenderness. There is no guarding.      Comments: PEG LUQ,no redness or sign of infection at insertion site   Musculoskeletal:      Right lower leg: No edema.      Left lower leg: No edema.   Skin:     General: Skin  is warm and dry.   Neurological:      Mental Status: He is alert. Mental status is at baseline.      Comments: Awake and alert  Able write down needs             Significant Labs: All pertinent labs within the past 24 hours have been reviewed.    Significant Imaging: I have reviewed all pertinent imaging results/findings within the past 24 hours.

## 2023-11-27 LAB
ANION GAP SERPL CALC-SCNC: 8 MMOL/L (ref 8–16)
BASOPHILS # BLD AUTO: 0.02 K/UL (ref 0–0.2)
BASOPHILS NFR BLD: 0.2 % (ref 0–1.9)
BUN SERPL-MCNC: 39 MG/DL (ref 8–23)
CALCIUM SERPL-MCNC: 9.8 MG/DL (ref 8.7–10.5)
CHLORIDE SERPL-SCNC: 106 MMOL/L (ref 95–110)
CO2 SERPL-SCNC: 35 MMOL/L (ref 23–29)
CREAT SERPL-MCNC: 1.1 MG/DL (ref 0.5–1.4)
DIFFERENTIAL METHOD BLD: ABNORMAL
EOSINOPHIL # BLD AUTO: 0.1 K/UL (ref 0–0.5)
EOSINOPHIL NFR BLD: 0.9 % (ref 0–8)
ERYTHROCYTE [DISTWIDTH] IN BLOOD BY AUTOMATED COUNT: 13.8 % (ref 11.5–14.5)
EST. GFR  (NO RACE VARIABLE): >60 ML/MIN/1.73 M^2
GLUCOSE SERPL-MCNC: 170 MG/DL (ref 70–110)
HCT VFR BLD AUTO: 39.1 % (ref 40–54)
HGB BLD-MCNC: 11.5 G/DL (ref 14–18)
IMM GRANULOCYTES # BLD AUTO: 0.06 K/UL (ref 0–0.04)
IMM GRANULOCYTES NFR BLD AUTO: 0.7 % (ref 0–0.5)
LYMPHOCYTES # BLD AUTO: 0.5 K/UL (ref 1–4.8)
LYMPHOCYTES NFR BLD: 5.8 % (ref 18–48)
MCH RBC QN AUTO: 27.7 PG (ref 27–31)
MCHC RBC AUTO-ENTMCNC: 29.4 G/DL (ref 32–36)
MCV RBC AUTO: 94 FL (ref 82–98)
MONOCYTES # BLD AUTO: 0.7 K/UL (ref 0.3–1)
MONOCYTES NFR BLD: 7.6 % (ref 4–15)
NEUTROPHILS # BLD AUTO: 7.6 K/UL (ref 1.8–7.7)
NEUTROPHILS NFR BLD: 84.8 % (ref 38–73)
NRBC BLD-RTO: 0 /100 WBC
PLATELET # BLD AUTO: 204 K/UL (ref 150–450)
PMV BLD AUTO: 9.9 FL (ref 9.2–12.9)
POCT GLUCOSE: 159 MG/DL (ref 70–110)
POCT GLUCOSE: 173 MG/DL (ref 70–110)
POTASSIUM SERPL-SCNC: 3.1 MMOL/L (ref 3.5–5.1)
RBC # BLD AUTO: 4.15 M/UL (ref 4.6–6.2)
SODIUM SERPL-SCNC: 149 MMOL/L (ref 136–145)
WBC # BLD AUTO: 8.93 K/UL (ref 3.9–12.7)

## 2023-11-27 PROCEDURE — 97530 THERAPEUTIC ACTIVITIES: CPT | Mod: CQ

## 2023-11-27 PROCEDURE — 27000221 HC OXYGEN, UP TO 24 HOURS

## 2023-11-27 PROCEDURE — 99900026 HC AIRWAY MAINTENANCE (STAT)

## 2023-11-27 PROCEDURE — 97110 THERAPEUTIC EXERCISES: CPT | Mod: CQ

## 2023-11-27 PROCEDURE — 25000242 PHARM REV CODE 250 ALT 637 W/ HCPCS: Performed by: HOSPITALIST

## 2023-11-27 PROCEDURE — 25000003 PHARM REV CODE 250: Performed by: HOSPITALIST

## 2023-11-27 PROCEDURE — 80048 BASIC METABOLIC PNL TOTAL CA: CPT | Performed by: HOSPITALIST

## 2023-11-27 PROCEDURE — 25000003 PHARM REV CODE 250: Performed by: STUDENT IN AN ORGANIZED HEALTH CARE EDUCATION/TRAINING PROGRAM

## 2023-11-27 PROCEDURE — 99900035 HC TECH TIME PER 15 MIN (STAT)

## 2023-11-27 PROCEDURE — 97110 THERAPEUTIC EXERCISES: CPT

## 2023-11-27 PROCEDURE — 94640 AIRWAY INHALATION TREATMENT: CPT

## 2023-11-27 PROCEDURE — 85025 COMPLETE CBC W/AUTO DIFF WBC: CPT | Performed by: HOSPITALIST

## 2023-11-27 PROCEDURE — 36415 COLL VENOUS BLD VENIPUNCTURE: CPT | Performed by: HOSPITALIST

## 2023-11-27 PROCEDURE — 25000003 PHARM REV CODE 250: Performed by: PHYSICIAN ASSISTANT

## 2023-11-27 PROCEDURE — 21400001 HC TELEMETRY ROOM

## 2023-11-27 PROCEDURE — 63600175 PHARM REV CODE 636 W HCPCS: Performed by: HOSPITALIST

## 2023-11-27 PROCEDURE — 94761 N-INVAS EAR/PLS OXIMETRY MLT: CPT

## 2023-11-27 RX ADMIN — SODIUM CHLORIDE SOLN NEBU 3% 4 ML: 3 NEBU SOLN at 08:11

## 2023-11-27 RX ADMIN — BUDESONIDE 0.5 MG: 0.5 INHALANT RESPIRATORY (INHALATION) at 08:11

## 2023-11-27 RX ADMIN — ASPIRIN 81 MG CHEWABLE TABLET 81 MG: 81 TABLET CHEWABLE at 09:11

## 2023-11-27 RX ADMIN — PIPERACILLIN AND TAZOBACTAM 4.5 G: 4; .5 INJECTION, POWDER, LYOPHILIZED, FOR SOLUTION INTRAVENOUS; PARENTERAL at 04:11

## 2023-11-27 RX ADMIN — IPRATROPIUM BROMIDE AND ALBUTEROL SULFATE 3 ML: 2.5; .5 SOLUTION RESPIRATORY (INHALATION) at 08:11

## 2023-11-27 RX ADMIN — SODIUM CHLORIDE SOLN NEBU 3% 4 ML: 3 NEBU SOLN at 02:11

## 2023-11-27 RX ADMIN — IPRATROPIUM BROMIDE AND ALBUTEROL SULFATE 3 ML: 2.5; .5 SOLUTION RESPIRATORY (INHALATION) at 02:11

## 2023-11-27 RX ADMIN — PIPERACILLIN AND TAZOBACTAM 4.5 G: 4; .5 INJECTION, POWDER, LYOPHILIZED, FOR SOLUTION INTRAVENOUS; PARENTERAL at 11:11

## 2023-11-27 RX ADMIN — FLUOXETINE HYDROCHLORIDE 20 MG: 20 SOLUTION ORAL at 09:11

## 2023-11-27 RX ADMIN — METOPROLOL TARTRATE 12.5 MG: 25 TABLET, FILM COATED ORAL at 09:11

## 2023-11-27 RX ADMIN — IPRATROPIUM BROMIDE AND ALBUTEROL SULFATE 3 ML: 2.5; .5 SOLUTION RESPIRATORY (INHALATION) at 12:11

## 2023-11-27 RX ADMIN — Medication 50 MG: at 09:11

## 2023-11-27 RX ADMIN — POTASSIUM PHOSPHATE, MONOBASIC 1000 MG: 500 TABLET, SOLUBLE ORAL at 09:11

## 2023-11-27 RX ADMIN — SODIUM CHLORIDE SOLN NEBU 3% 4 ML: 3 NEBU SOLN at 07:11

## 2023-11-27 RX ADMIN — PIPERACILLIN AND TAZOBACTAM 4.5 G: 4; .5 INJECTION, POWDER, LYOPHILIZED, FOR SOLUTION INTRAVENOUS; PARENTERAL at 08:11

## 2023-11-27 RX ADMIN — CLOPIDOGREL BISULFATE 75 MG: 75 TABLET ORAL at 09:11

## 2023-11-27 RX ADMIN — ATORVASTATIN CALCIUM 40 MG: 40 TABLET, FILM COATED ORAL at 09:11

## 2023-11-27 RX ADMIN — ARFORMOTEROL TARTRATE 15 MCG: 15 SOLUTION RESPIRATORY (INHALATION) at 08:11

## 2023-11-27 RX ADMIN — Medication 6 MG: at 09:11

## 2023-11-27 RX ADMIN — IPRATROPIUM BROMIDE AND ALBUTEROL SULFATE 3 ML: 2.5; .5 SOLUTION RESPIRATORY (INHALATION) at 07:11

## 2023-11-27 RX ADMIN — FAMOTIDINE 20 MG: 10 INJECTION INTRAVENOUS at 09:11

## 2023-11-27 RX ADMIN — BUDESONIDE 0.5 MG: 0.5 INHALANT RESPIRATORY (INHALATION) at 07:11

## 2023-11-27 RX ADMIN — FOLIC ACID 1 MG: 1 TABLET ORAL at 09:11

## 2023-11-27 RX ADMIN — ARFORMOTEROL TARTRATE 15 MCG: 15 SOLUTION RESPIRATORY (INHALATION) at 07:11

## 2023-11-27 NOTE — NURSING
[facility], [facility] - [place], [place]  Nurses Note -- 4 Eyes      11/27/2023       Skin assessed on: Q Shift      [x] No Pressure Injuries Present    []Prevention Measures Documented    [] Yes LDA  for Pressure Injury Previously documented     [] Yes New Pressure Injury Discovered   [] LDA for New Pressure Injury Added      Attending RN:  Mirian Garcia, RN     Second RN:  Trupti Hall RN

## 2023-11-27 NOTE — PT/OT/SLP PROGRESS
Physical Therapy Treatment    Patient Name:  Fareed Richard Jr.   MRN:  [id number]    Recommendations:     Discharge Recommendations: Low Intensity Therapy  Discharge Equipment Recommendations: to be determined by next level of care  Barriers to discharge:  Pt with increased O2 demands at this time (5L at baseline)    Assessment:     Fareed Richard Jr. is a 73 y.o. male admitted with a medical diagnosis of Acute on chronic respiratory failure with hypoxia.  He presents with the following impairments/functional limitations: impaired endurance, impaired self care skills, impaired functional mobility, gait instability, decreased safety awareness, impaired cardiopulmonary response to activity.    Pt tolerated bed<>chair transfer with no AD and CGA/SBA today, O2 steady at 92-95%.    Rehab Prognosis: Good; patient would benefit from acute skilled PT services to address these deficits and reach maximum level of function.    Recent Surgery: * No surgery found *      Plan:     During this hospitalization, patient to be seen 2 x/week to address the identified rehab impairments via gait training, therapeutic activities, therapeutic exercises, neuromuscular re-education and progress toward the following goals:    Plan of Care Expires:  12/07/23    Subjective     Chief Complaint: None at this time  Patient/Family Comments/goals: Pt agreed to sit up in chair  Pain/Comfort:  Pain Rating 1: 0/10      Objective:     Communicated with Mirian prior to session.  Patient found HOB elevated with oxygen, PEG Tube, peripheral IV, telemetry, Tracheostomy upon PT entry to room.     General Precautions: Standard, fall, respiratory  Orthopedic Precautions: N/A  Braces: N/A  Respiratory Status:  Trach collar 8L @35%     Functional Mobility:  Bed Mobility:     Rolling Right: supervision  Scooting: supervision  Supine to Sit: supervision  Transfers: x1 trial from EOB    Sit to Stand:  stand by assistance with no AD  Bed to Chair: stand by assistance  with  no AD  using  Step Transfer  Gait: Pt ambulated 5-6 steps from bed<>[place] with no AD and CGA/SBA. Pt with forward flex posture and able to avoid lines/tubes in his path.   Balance: Pt with Fair standing balance      AM-PAC 6 CLICK MOBILITY  Turning over in bed (including adjusting bedclothes, sheets and blankets)?: 4  Sitting down on and standing up from a chair with arms (e.g., wheelchair, bedside commode, etc.): 3  Moving from lying on back to sitting on the side of the bed?: 3  Moving to and from a bed to a chair (including a wheelchair)?: 3  Need to walk in hospital room?: 3  Climbing 3-5 steps with a railing?: 2  Basic Mobility Total Score: 18       Treatment & Education:  Pt instructed to perform supine AP, hip abd/add, and heel slides 2x10 BLE. Pt required extended time to perform 2* higher O2 demands. Pt required t/v cueing to ensure proper sequencing of exercises.    Patient left up in chair with all lines intact, call button in reach, and nursing notified.    GOALS:   Multidisciplinary Problems       Physical Therapy Goals          Problem: Physical Therapy    Goal Priority Disciplines Outcome Goal Variances Interventions   Physical Therapy Goal     PT, PT/OT Ongoing, Progressing     Description: Goals to be met by: 23     Patient will increase functional independence with mobility by performin. Supine to sit with Modified [place]  2. Rolling to Left and Right with Modified [place].  3. Sit to stand transfer with Modified [place]  4. Bed to chair transfer with Modified [place] using No Assistive Device  5. Gait  x 300 feet with Modified [place] using No Assistive Device.                          Time Tracking:     PT Received On:    PT Start Time: 942     PT Stop Time: 100  PT Total Time (min): 24 min     Billable Minutes: Therapeutic Activity 9 and Therapeutic Exercise 15    Treatment Type: Treatment  PT/PTA: PTA     Number of PTA visits since last PT visit:  1     11/27/2023

## 2023-11-27 NOTE — PT/OT/SLP PROGRESS
Occupational Therapy   Treatment    Name: Fareed Richard Jr.  MRN: [id number]  Admitting Diagnosis:  Acute on chronic respiratory failure with hypoxia       Recommendations:     Discharge Recommendations: Low Intensity Therapy  Discharge Equipment Recommendations:  none  Barriers to discharge:  Other (Comment) (patient says he would need ambulance to return home due to steps to get in/out of home)    Assessment:     Fareed Richard Jr. is a 73 y.o. male with a medical diagnosis of Acute on chronic respiratory failure with hypoxia.  He presents with HOB elevated and note pad at bedside asking if he could have ambulance to return home when he does, so occupational therapy notified . Performance deficits affecting function are weakness, impaired endurance, impaired self care skills, impaired functional mobility, gait instability, impaired balance, decreased lower extremity function, decreased ROM, impaired cardiopulmonary response to activity, impaired skin.     Rehab Prognosis:  Good; patient would benefit from acute skilled OT services to address these deficits and reach maximum level of function.       Plan:     Patient to be seen  (2-3x/wk) to address the above listed problems via self-care/home management, therapeutic activities, therapeutic exercises  Plan of Care Expires: 12/06/23  Plan of Care Reviewed with: patient    Subjective     Chief Complaint: none   Patient/Family Comments/goals: to have an ambulance to return home at time of d/c   Pain/Comfort:  Pain Rating 1: 0/10    Objective:     Communicated with: Mirian TRIANA, prior to session.  Patient found HOB elevated with oxygen, PEG Tube, peripheral IV, telemetry, Tracheostomy upon OT entry to room.    General Precautions: Standard, fall, respiratory    Orthopedic Precautions:N/A  Braces: N/A  Respiratory Status: High flow, flow 8  L/min, concentration  %     Occupational Performance:     Bed Mobility:    NT due to just returned to bed      Functional  Mobility/Transfers:  NT due to just returned to bed     Activities of Daily Living:  NT       [facility] 6 Click ADL: 21    Treatment & Education:  Patient completed 2 sets each x 10 reps with chest, shoulder, triceps/biceps with B upper extremity while HOB elevated with no c/o pain or SOB.     Patient left HOB elevated with all lines intact, call button in reach, and RN  notified    GOALS:   Multidisciplinary Problems       Occupational Therapy Goals          Problem: Occupational Therapy    Goal Priority Disciplines Outcome Interventions   Occupational Therapy Goal     OT, PT/OT Ongoing, Progressing    Description: Goals to be met by: 12/6/23     Patient will increase functional independence with ADLs by performing:    LE Dressing with Modified [place].  Grooming while standing at sink with Modified [place].  Toileting from toilet with Modified [place] for hygiene and clothing management.   Step transfer with Modified [place]  Toilet transfer to toilet with Modified [place].  Upper extremity exercise program x15 reps per handout, with independence.  Implementation of PLB and energy conservation strategies into daily routines w/ (I).                          Time Tracking:     OT Date of Treatment: 11/27/23  OT Start Time: 1531  OT Stop Time: 1545  OT Total Time (min): 14 min    Billable Minutes:Therapeutic Exercise 14     OT/NELLY: OT          11/27/2023

## 2023-11-27 NOTE — ASSESSMENT & PLAN NOTE
Patient has hypernatremia which is uncontrolled. The hypernatremia is due to Dehydration. We will aim to correct the sodium by 8-10mEq in 24 hours. We will correct their hypernatremia with  The patient's sodium results have been reviewed and are listed below.  Recent Labs   Lab 11/27/23  0401   *     Add free water 250 ml every 4 hours

## 2023-11-27 NOTE — NURSING
Note that patient given acetaminophen for 8/10 left-ear pain, after he cleaned his ear with a Q-Tip given to him by his wife. No redness or swelling to external ear.   NP Myesha notified.     Also note that that Kangaroo Pump tubing changed, and new bag of Isosource hung. Infusing @40 mL/hr.     Will give report to night-shift RN for continued monitoring.

## 2023-11-27 NOTE — SUBJECTIVE & OBJECTIVE
Interval History: feeling well, on 8 L trach collar    Review of Systems   HENT:  Negative for sore throat.    Respiratory:  Positive for cough and shortness of breath.    Cardiovascular:  Negative for chest pain.   Gastrointestinal:  Negative for abdominal pain.     Objective:     Vital Signs (Most Recent):  Temp: 97.9 °F (36.6 °C) (11/27/23 1103)  Pulse: 71 (11/27/23 1103)  Resp: 18 (11/27/23 1103)  BP: 118/64 (11/27/23 1103)  SpO2: 95 % (11/27/23 1103) Vital Signs (24h Range):  Temp:  [97.5 °F (36.4 °C)-98.2 °F (36.8 °C)] 97.9 °F (36.6 °C)  Pulse:  [] 71  Resp:  [16-22] 18  SpO2:  [93 %-99 %] 95 %  BP: (118-142)/(64-80) 118/64     Weight: 56.6 kg (124 lb 12.5 oz)  Body mass index is 18.97 kg/m².    Intake/Output Summary (Last 24 hours) at 11/27/2023 1252  Last data filed at 11/27/2023 1103  Gross per 24 hour   Intake 1948.8 ml   Output 750 ml   Net 1198.8 ml           Physical Exam  Vitals and nursing note reviewed.   Constitutional:       General: He is not in acute distress.     Appearance: He is ill-appearing. He is not toxic-appearing.      Comments: Thin man   HENT:      Nose:      Comments: Scab on nose     Mouth/Throat:      Comments: Edentulous.   Neck:      Comments: Cuffless Trach in place  Cardiovascular:      Rate and Rhythm: Normal rate and regular rhythm.      Pulses: Normal pulses.      Heart sounds: Normal heart sounds.   Pulmonary:      Effort: Pulmonary effort is normal. No respiratory distress.      Breath sounds: Rales present. No rhonchi.      Comments: On trach collar 8 L currently  Coarse     Abdominal:      General: Bowel sounds are normal. There is no distension.      Palpations: Abdomen is soft.      Tenderness: There is no abdominal tenderness. There is no guarding.      Comments: PEG LUQ,no redness or sign of infection at insertion site   Musculoskeletal:      Right lower leg: No edema.      Left lower leg: No edema.   Skin:     General: Skin is warm and dry.   Neurological:       Mental Status: He is alert. Mental status is at baseline.      Comments: Awake and alert  Able write down needs             Significant Labs: All pertinent labs within the past 24 hours have been reviewed.    Significant Imaging: I have reviewed all pertinent imaging results/findings within the past 24 hours.

## 2023-11-27 NOTE — ASSESSMENT & PLAN NOTE
Patient with Hypoxic Respiratory failure which is Acute on chronic.  he is on home oxygen at 6 LPM trach collar. Supplemental oxygen was provided and noted-   Likely in the setting of COPD exacerbation/underlying pneumonia.   -Seen by ICU and Pulmonologist - stepped down to floor 11/24  -CXR unremarkable.   -Trach aspirate culture Pseudomonas  -Continue zosyn day 7  -Continue PO prednisone, duoneb/NS 3%  -RT weaning oxygen accordingly to keep O2 88-92%

## 2023-11-27 NOTE — ASSESSMENT & PLAN NOTE
Results for orders placed during the hospital encounter of 03/18/23    Echo    Interpretation Summary  · The left ventricle is normal in size with concentric hypertrophy and normal systolic function.  · The estimated ejection fraction is 60%. Inferobasal hypokinesis  · Indeterminate left ventricular diastolic function.  · Normal right ventricular size with normal right ventricular systolic function.  · Mild left atrial enlargement.  · Mild right atrial enlargement.  · Normal central venous pressure (3 mmHg).  · The estimated PA systolic pressure is 13 mmHg.  · The sinuses of Valsalva is mildly dilated.  BNP  Recent Labs   Lab 11/20/23  1606   BNP 1,708*       Patient does not appear to be volume overloaded on exam  - lasix 40mg IV x2 given, monitor UOP  - Lasix PRN

## 2023-11-27 NOTE — ASSESSMENT & PLAN NOTE
Patient's anemia is currently controlled. Has not received any PRBCs to date. 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

## 2023-11-27 NOTE — PLAN OF CARE
Problem: Adult Inpatient Plan of Care  Goal: Plan of Care Review  Outcome: Ongoing, Progressing     Problem: Diabetes Comorbidity  Goal: Blood Glucose Level Within Targeted Range  Outcome: Ongoing, Progressing     Problem: Fluid Imbalance (Pneumonia)  Goal: Fluid Balance  Outcome: Ongoing, Progressing     Problem: Infection (Pneumonia)  Goal: Resolution of Infection Signs and Symptoms  Outcome: Ongoing, Progressing     Problem: Respiratory Compromise (Pneumonia)  Goal: Effective Oxygenation and Ventilation  Outcome: Ongoing, Progressing

## 2023-11-27 NOTE — NURSING
[facility] [place]  Nurses Note -- 4 Eyes      11/26/2023       Skin assessed on: Q Shift      [x] No Pressure Injuries Present    [x]Prevention Measures Documented    [] Yes LDA  for Pressure Injury Previously documented     [] Yes New Pressure Injury Discovered   [] LDA for New Pressure Injury Added      Attending RN:  Trupti Hall RN     Second RN:  KAMILAH Hernandez

## 2023-11-27 NOTE — PLAN OF CARE
Problem: Physical Therapy  Goal: Physical Therapy Goal  Description: Goals to be met by: 23     Patient will increase functional independence with mobility by performin. Supine to sit with Modified [place]  2. Rolling to Left and Right with Modified [place].  3. Sit to stand transfer with Modified [place]  4. Bed to chair transfer with Modified [place] using No Assistive Device  5. Gait  x 300 feet with Modified [place] using No Assistive Device.     Outcome: Ongoing, Progressing         Pt tolerated bed<>chair transfer with no AD and CGA/SBA today, O2 steady at 92-95%.

## 2023-11-27 NOTE — PROGRESS NOTES
[facility] Medicine  Progress Note    Patient Name: Fareed Richard Jr.  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     Patient presents with worsening shortness of breath that started 3 days prior to presentation.  He additionally reports worsening secretion from his tracheostomy.  Additional symptoms include wheezing.    In the ED, the patient was tachycardic (110), tachypneic and hypoxic (SpO2:  85%), and was placed on BiPAP.  Labs were remarkable for an elevated BNP (1708), elevated troponin (0.030).  VBG showed a pH of 7.36, pCO2 of 60.3, HC03 of 35.8.  Chest x-ray showed no significant change from prior studies.  Patient was given Lasix 40 mg IV, Solu-Medrol 80 mg IV, vancomycin and Zosyn.  He was admitted for further management.    Overview/Hospital Course:  Mr Fareed Richard Jr. Was admitted with acute hypoxic respiratory failure secondary to Pseudomonas PNA and COPD exacerbation. On oxygen 6 L trach collar and tube feeding at home. Started BiPAP, zosyn, and admitted to ICU. Stepped down to floor 11/24. Lung coarse and still requiring 8 L oxygen. Continue zoysn (day 6 of 7), duoneb/NS 3% neb, PO prednisone (day 4 or 5). RT following closely and wean to keep 88-90%.  Tolerating TF at goal 40 cc/hr isosource. PT/OT evaluation completed. Low intensity on discharge. Hopefully home with wife in 2-3 days.     Interval History: feeling well, on 8 L trach collar    Review of Systems   HENT:  Negative for sore throat.    Respiratory:  Positive for cough and  shortness of breath.    Cardiovascular:  Negative for chest pain.   Gastrointestinal:  Negative for abdominal pain.     Objective:     Vital Signs (Most Recent):  Temp: 97.9 °F (36.6 °C) (11/27/23 1103)  Pulse: 71 (11/27/23 1103)  Resp: 18 (11/27/23 1103)  BP: 118/64 (11/27/23 1103)  SpO2: 95 % (11/27/23 1103) Vital Signs (24h Range):  Temp:  [97.5 °F (36.4 °C)-98.2 °F (36.8 °C)] 97.9 °F (36.6 °C)  Pulse:  [] 71  Resp:  [16-22] 18  SpO2:  [93 %-99 %] 95 %  BP: (118-142)/(64-80) 118/64     Weight: 56.6 kg (124 lb 12.5 oz)  Body mass index is 18.97 kg/m².    Intake/Output Summary (Last 24 hours) at 11/27/2023 1252  Last data filed at 11/27/2023 1103  Gross per 24 hour   Intake 1948.8 ml   Output 750 ml   Net 1198.8 ml           Physical Exam  Vitals and nursing note reviewed.   Constitutional:       General: He is not in acute distress.     Appearance: He is ill-appearing. He is not toxic-appearing.      Comments: Thin man   HENT:      Nose:      Comments: Scab on nose     Mouth/Throat:      Comments: Edentulous.   Neck:      Comments: Cuffless Trach in place  Cardiovascular:      Rate and Rhythm: Normal rate and regular rhythm.      Pulses: Normal pulses.      Heart sounds: Normal heart sounds.   Pulmonary:      Effort: Pulmonary effort is normal. No respiratory distress.      Breath sounds: Rales present. No rhonchi.      Comments: On trach collar 8 L currently  Coarse     Abdominal:      General: Bowel sounds are normal. There is no distension.      Palpations: Abdomen is soft.      Tenderness: There is no abdominal tenderness. There is no guarding.      Comments: PEG LUQ,no redness or sign of infection at insertion site   Musculoskeletal:      Right lower leg: No edema.      Left lower leg: No edema.   Skin:     General: Skin is warm and dry.   Neurological:      Mental Status: He is alert. Mental status is at baseline.      Comments: Awake and alert  Able write down needs             Significant Labs: All  pertinent labs within the past 24 hours have been reviewed.    Significant Imaging: I have reviewed all pertinent imaging results/findings within the past 24 hours.    Assessment/Plan:      * Acute on chronic respiratory failure with hypoxia  Patient with Hypoxic Respiratory failure which is Acute on chronic.  he is on home oxygen at 6 LPM trach collar. Supplemental oxygen was provided and noted-   Likely in the setting of COPD exacerbation/underlying pneumonia.   -Seen by ICU and Pulmonologist - stepped down to floor 11/24  -CXR unremarkable.   -Trach aspirate culture Pseudomonas  -Continue zosyn day 7  -Continue PO prednisone, duoneb/NS 3%  -RT weaning oxygen accordingly to keep O2 88-92%    Physical deconditioning  PT/OT following  Low intensity on discharge      Pneumonia due to Pseudomonas species  Trach aspirate with Pseudomonas  - zosyn x7 days - day 7      Normocytic anemia  Patient's anemia is currently controlled. Has not received any PRBCs to date. 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    Acute on chronic diastolic heart failure  Results for orders placed during the hospital encounter of 03/18/23    Echo    Interpretation Summary  · The left ventricle is normal in size with concentric hypertrophy and normal systolic function.  · The estimated ejection fraction is 60%. Inferobasal hypokinesis  · Indeterminate left ventricular diastolic function.  · Normal right ventricular size with normal right ventricular systolic function.  · Mild left atrial enlargement.  · Mild right atrial enlargement.  · Normal central venous pressure (3 mmHg).  · The estimated PA systolic pressure is 13 mmHg.  · The sinuses of Valsalva is mildly dilated.  BNP  Recent Labs   Lab  11/20/23  1606   BNP 1,708*       Patient does not appear to be volume overloaded on exam  - lasix 40mg IV x2 given, monitor UOP  - Lasix PRN      PEG (percutaneous endoscopic gastrostomy) status  History noted.     Tracheostomy present  Currently has cuffless Shiley 6.0      ACP (advance care planning)  Advance Care Planning    Date: 11/21/2023  Confirmed full code status           Hypernatremia  Patient has hypernatremia which is uncontrolled. The hypernatremia is due to Dehydration. We will aim to correct the sodium by 8-10mEq in 24 hours. We will correct their hypernatremia with  The patient's sodium results have been reviewed and are listed below.  Recent Labs   Lab 11/27/23  0401   *     Add free water 250 ml every 4 hours    Severe protein-calorie malnutrition  Nutrition consulted. Most recent weight and BMI monitored-   Measurements:  Wt Readings from Last 1 Encounters:   11/25/23 56.6 kg (124 lb 12.5 oz)   Body mass index is 18.97 kg/m².  RD following - at goal 40 ml/hr isosource 1.7  Add additional free water as Na 149     COPD exacerbation  Patient's COPD is with exacerbation noted by continued dyspnea and use of accessory muscles for breathing currently.  Patient is currently off COPD Pathway (ICU admit). Continue scheduled inhalers Steroids, Antibiotics, and Supplemental oxygen, scheduled nebs and monitor respiratory status closely.   - On AVAPS previously on admission , now on trach collar   - Pulmonary following  - changed solumedrol to prednisone to complete 5 days -day 5  - trach aspirate culture= Pseudomonas, continue zosyn -day 7     Other hyperlipidemia  Continue statin      Primary hypertension  BP is currently well controlled  - continue metoprolol     Coronary artery disease involving native coronary artery of native heart without angina pectoris  No acute issues.  No signs of ACS  - continue asa, statin, plavix     Squamous cell cancer of tongue  History noted. Cancer is not active.  Outpatient follow up         VTE Risk Mitigation (From admission, onward)           Ordered     IP VTE HIGH RISK PATIENT  Once         11/20/23 1944     Place sequential compression device  Until discontinued         11/20/23 1944                    Discharge Planning   NISA:      Code Status: Full Code   Is the patient medically ready for discharge?:     Reason for patient still in hospital (select all that apply): Treatment  Discharge Plan A: Home with family   Discharge Delays: None known at this time              Luiz Patel MD  Department of [facility] Medicine   [facility]

## 2023-11-27 NOTE — ASSESSMENT & PLAN NOTE
Patient's COPD is with exacerbation noted by continued dyspnea and use of accessory muscles for breathing currently.  Patient is currently off COPD Pathway (ICU admit). Continue scheduled inhalers Steroids, Antibiotics, and Supplemental oxygen, scheduled nebs and monitor respiratory status closely.   - On AVAPS previously on admission , now on trach collar   - Pulmonary following  - changed solumedrol to prednisone to complete 5 days -day 5  - trach aspirate culture= Pseudomonas, continue zosyn -day 7

## 2023-11-28 LAB
ANION GAP SERPL CALC-SCNC: 12 MMOL/L (ref 8–16)
BUN SERPL-MCNC: 35 MG/DL (ref 8–23)
CALCIUM SERPL-MCNC: 10.1 MG/DL (ref 8.7–10.5)
CHLORIDE SERPL-SCNC: 106 MMOL/L (ref 95–110)
CO2 SERPL-SCNC: 33 MMOL/L (ref 23–29)
CREAT SERPL-MCNC: 1.1 MG/DL (ref 0.5–1.4)
EST. GFR  (NO RACE VARIABLE): >60 ML/MIN/1.73 M^2
GLUCOSE SERPL-MCNC: 145 MG/DL (ref 70–110)
POCT GLUCOSE: 152 MG/DL (ref 70–110)
POCT GLUCOSE: 161 MG/DL (ref 70–110)
POCT GLUCOSE: 169 MG/DL (ref 70–110)
POCT GLUCOSE: 178 MG/DL (ref 70–110)
POTASSIUM SERPL-SCNC: 3.2 MMOL/L (ref 3.5–5.1)
SODIUM SERPL-SCNC: 151 MMOL/L (ref 136–145)

## 2023-11-28 PROCEDURE — 36415 COLL VENOUS BLD VENIPUNCTURE: CPT | Performed by: HOSPITALIST

## 2023-11-28 PROCEDURE — 80048 BASIC METABOLIC PNL TOTAL CA: CPT | Performed by: HOSPITALIST

## 2023-11-28 PROCEDURE — 25000003 PHARM REV CODE 250: Performed by: PHYSICIAN ASSISTANT

## 2023-11-28 PROCEDURE — 25000003 PHARM REV CODE 250: Performed by: HOSPITALIST

## 2023-11-28 PROCEDURE — 99900035 HC TECH TIME PER 15 MIN (STAT)

## 2023-11-28 PROCEDURE — 97530 THERAPEUTIC ACTIVITIES: CPT

## 2023-11-28 PROCEDURE — 27000221 HC OXYGEN, UP TO 24 HOURS

## 2023-11-28 PROCEDURE — 25000242 PHARM REV CODE 250 ALT 637 W/ HCPCS: Performed by: HOSPITALIST

## 2023-11-28 PROCEDURE — 94640 AIRWAY INHALATION TREATMENT: CPT

## 2023-11-28 PROCEDURE — 94761 N-INVAS EAR/PLS OXIMETRY MLT: CPT

## 2023-11-28 PROCEDURE — 99900026 HC AIRWAY MAINTENANCE (STAT)

## 2023-11-28 PROCEDURE — 21400001 HC TELEMETRY ROOM

## 2023-11-28 PROCEDURE — 31720 CLEARANCE OF AIRWAYS: CPT

## 2023-11-28 PROCEDURE — 63600175 PHARM REV CODE 636 W HCPCS: Performed by: HOSPITALIST

## 2023-11-28 RX ADMIN — CLOPIDOGREL BISULFATE 75 MG: 75 TABLET ORAL at 09:11

## 2023-11-28 RX ADMIN — ATORVASTATIN CALCIUM 40 MG: 40 TABLET, FILM COATED ORAL at 09:11

## 2023-11-28 RX ADMIN — PIPERACILLIN AND TAZOBACTAM 4.5 G: 4; .5 INJECTION, POWDER, LYOPHILIZED, FOR SOLUTION INTRAVENOUS; PARENTERAL at 04:11

## 2023-11-28 RX ADMIN — FAMOTIDINE 20 MG: 10 INJECTION INTRAVENOUS at 09:11

## 2023-11-28 RX ADMIN — Medication 50 MG: at 09:11

## 2023-11-28 RX ADMIN — METOPROLOL TARTRATE 12.5 MG: 25 TABLET, FILM COATED ORAL at 09:11

## 2023-11-28 RX ADMIN — FLUOXETINE HYDROCHLORIDE 20 MG: 20 SOLUTION ORAL at 09:11

## 2023-11-28 RX ADMIN — Medication 6 MG: at 10:11

## 2023-11-28 RX ADMIN — ARFORMOTEROL TARTRATE 15 MCG: 15 SOLUTION RESPIRATORY (INHALATION) at 07:11

## 2023-11-28 RX ADMIN — IPRATROPIUM BROMIDE AND ALBUTEROL SULFATE 3 ML: 2.5; .5 SOLUTION RESPIRATORY (INHALATION) at 11:11

## 2023-11-28 RX ADMIN — SODIUM CHLORIDE SOLN NEBU 3% 4 ML: 3 NEBU SOLN at 01:11

## 2023-11-28 RX ADMIN — IPRATROPIUM BROMIDE AND ALBUTEROL SULFATE 3 ML: 2.5; .5 SOLUTION RESPIRATORY (INHALATION) at 08:11

## 2023-11-28 RX ADMIN — BUDESONIDE 0.5 MG: 0.5 INHALANT RESPIRATORY (INHALATION) at 07:11

## 2023-11-28 RX ADMIN — BUDESONIDE 0.5 MG: 0.5 INHALANT RESPIRATORY (INHALATION) at 08:11

## 2023-11-28 RX ADMIN — IPRATROPIUM BROMIDE AND ALBUTEROL SULFATE 3 ML: 2.5; .5 SOLUTION RESPIRATORY (INHALATION) at 07:11

## 2023-11-28 RX ADMIN — IPRATROPIUM BROMIDE AND ALBUTEROL SULFATE 3 ML: 2.5; .5 SOLUTION RESPIRATORY (INHALATION) at 01:11

## 2023-11-28 RX ADMIN — SODIUM CHLORIDE SOLN NEBU 3% 4 ML: 3 NEBU SOLN at 08:11

## 2023-11-28 RX ADMIN — IPRATROPIUM BROMIDE AND ALBUTEROL SULFATE 3 ML: 2.5; .5 SOLUTION RESPIRATORY (INHALATION) at 12:11

## 2023-11-28 RX ADMIN — ASPIRIN 81 MG CHEWABLE TABLET 81 MG: 81 TABLET CHEWABLE at 09:11

## 2023-11-28 RX ADMIN — ARFORMOTEROL TARTRATE 15 MCG: 15 SOLUTION RESPIRATORY (INHALATION) at 08:11

## 2023-11-28 RX ADMIN — SODIUM CHLORIDE SOLN NEBU 3% 4 ML: 3 NEBU SOLN at 07:11

## 2023-11-28 RX ADMIN — FOLIC ACID 1 MG: 1 TABLET ORAL at 09:11

## 2023-11-28 NOTE — PROGRESS NOTES
[facility] Medicine  Progress Note    Patient Name: Fareed Richard Jr.  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     Patient presents with worsening shortness of breath that started 3 days prior to presentation.  He additionally reports worsening secretion from his tracheostomy.  Additional symptoms include wheezing.    In the ED, the patient was tachycardic (110), tachypneic and hypoxic (SpO2:  85%), and was placed on BiPAP.  Labs were remarkable for an elevated BNP (1708), elevated troponin (0.030).  VBG showed a pH of 7.36, pCO2 of 60.3, HC03 of 35.8.  Chest x-ray showed no significant change from prior studies.  Patient was given Lasix 40 mg IV, Solu-Medrol 80 mg IV, vancomycin and Zosyn.  He was admitted for further management.    Overview/Hospital Course:  Mr Fareed Richard Jr. Was admitted with acute hypoxic respiratory failure secondary to Pseudomonas PNA and COPD exacerbation. On oxygen 6 L trach collar and tube feeding at home. Started BiPAP, zosyn, and admitted to ICU. Stepped down to floor 11/24. Lung coarse and still requiring 8 L oxygen. Continue zoysn (day 6 of 7), duoneb/NS 3% neb, PO prednisone. RT following closely and wean to keep 88-90%.  Tolerating TF at goal 40 cc/hr isosource. PT/OT evaluation completed. Low intensity on discharge. Completed 7 day course antibx for Pseudomonas pneumonia. Slowly weaning O2 to home.    Interval History: weaned down to 7 L, doing ok, feeling thirsty    Review of Systems   HENT:  Negative for sore throat.     Respiratory:  Positive for cough and shortness of breath.    Cardiovascular:  Negative for chest pain.   Gastrointestinal:  Negative for abdominal pain.     Objective:     Vital Signs (Most Recent):  Temp: 97.5 °F (36.4 °C) (11/28/23 1138)  Pulse: 73 (11/28/23 1138)  Resp: 18 (11/28/23 1138)  BP: 135/67 (11/28/23 1138)  SpO2: 96 % (11/28/23 1138) Vital Signs (24h Range):  Temp:  [97.5 °F (36.4 °C)-98.2 °F (36.8 °C)] 97.5 °F (36.4 °C)  Pulse:  [65-86] 73  Resp:  [16-18] 18  SpO2:  [92 %-98 %] 96 %  BP: (118-140)/(55-78) 135/67     Weight: 56.6 kg (124 lb 12.5 oz)  Body mass index is 18.97 kg/m².    Intake/Output Summary (Last 24 hours) at 11/28/2023 1237  Last data filed at 11/28/2023 0939  Gross per 24 hour   Intake 980 ml   Output 800 ml   Net 180 ml           Physical Exam  Vitals and nursing note reviewed.   Constitutional:       General: He is not in acute distress.     Appearance: He is ill-appearing. He is not toxic-appearing.      Comments: Thin man   HENT:      Nose:      Comments: Scab on nose     Mouth/Throat:      Comments: Edentulous.   Neck:      Comments: Cuffless Trach in place  Cardiovascular:      Rate and Rhythm: Normal rate and regular rhythm.      Pulses: Normal pulses.      Heart sounds: Normal heart sounds.   Pulmonary:      Effort: Pulmonary effort is normal. No respiratory distress.      Breath sounds: Rales present. No rhonchi.      Comments: On trach collar 7 L currently  Coarse BS    Abdominal:      General: Bowel sounds are normal. There is no distension.      Palpations: Abdomen is soft.      Tenderness: There is no abdominal tenderness. There is no guarding.      Comments: PEG in place   Musculoskeletal:      Right lower leg: No edema.      Left lower leg: No edema.   Skin:     General: Skin is warm and dry.   Neurological:      Mental Status: He is alert. Mental status is at baseline.      Comments: Awake and alert  Able write down needs             Significant Labs: All pertinent  "labs within the past 24 hours have been reviewed.    Significant Imaging: I have reviewed all pertinent imaging results/findings within the past 24 hours.    Assessment/Plan:      * Acute on chronic respiratory failure with hypoxia  Patient with Hypoxic Respiratory failure which is Acute on chronic.  he is on home oxygen at 6 LPM trach collar. Supplemental oxygen was provided and noted-   Likely in the setting of COPD exacerbation/underlying pneumonia.   -Seen by ICU and Pulmonologist - stepped down to floor 11/24  -CXR unremarkable.   -Trach aspirate culture Pseudomonas  -Completed 7 days Zosyn, completed prednisone  -duonebs  -RT weaning oxygen accordingly to keep O2 88-92%    Physical deconditioning  PT/OT following  Low intensity on discharge      Pneumonia due to Pseudomonas species  Trach aspirate with Pseudomonas  - zosyn x7 days - completed      Normocytic anemia  Patient's anemia is currently controlled. Has not received any PRBCs to date. 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    Acute on chronic diastolic heart failure  Results for orders placed during the hospital encounter of 03/18/23    Echo    Interpretation Summary  · The left ventricle is normal in size with concentric hypertrophy and normal systolic function.  · The estimated ejection fraction is 60%. Inferobasal hypokinesis  · Indeterminate left ventricular diastolic function.  · Normal right ventricular size with normal right ventricular systolic function.  · Mild left atrial enlargement.  · Mild right atrial enlargement.  · Normal central venous pressure (3 mmHg).  · The estimated PA systolic pressure is 13 mmHg.  · The sinuses of Valsalva is mildly dilated.  BNP  No results for input(s): "BNP", " ""BNPTRIAGEBLO" in the last 168 hours.    Patient does not appear to be volume overloaded on exam  - lasix 40mg IV x2 given, monitor UOP  - Lasix PRN      PEG (percutaneous endoscopic gastrostomy) status  History noted.     Tracheostomy present  Currently has cuffless Shiley 6.0      ACP (advance care planning)  Advance Care Planning    Date: 11/21/2023  Confirmed full code status           Hypernatremia  Patient has hypernatremia which is uncontrolled. The hypernatremia is due to Dehydration. We will aim to correct the sodium by 8-10mEq in 24 hours. We will correct their hypernatremia with  The patient's sodium results have been reviewed and are listed below.  Recent Labs   Lab 11/28/23  0552   *     Add free water 250 ml every 4 hours - discussed with nursing staff    Severe protein-calorie malnutrition  Nutrition consulted. Most recent weight and BMI monitored-   Measurements:  Wt Readings from Last 1 Encounters:   11/25/23 56.6 kg (124 lb 12.5 oz)   Body mass index is 18.97 kg/m².  RD following - at goal 40 ml/hr isosource 1.7  Add additional free water as Na 149     COPD exacerbation  Patient's COPD is with exacerbation noted by continued dyspnea and use of accessory muscles for breathing currently.  Patient is currently off COPD Pathway (ICU admit). Continue scheduled inhalers Steroids, Antibiotics, and Supplemental oxygen, scheduled nebs and monitor respiratory status closely.   - On AVAPS previously on admission , now on trach collar   - Pulmonary following  - changed solumedrol to prednisone to complete 5 days -Completed  - trach aspirate culture= Pseudomonas, continue zosyn for 7 days - Completed    Other hyperlipidemia  Continue statin      Primary hypertension  BP is currently well controlled  - continue metoprolol     Coronary artery disease involving native coronary artery of native heart without angina pectoris  No acute issues.  No signs of ACS  - continue asa, statin, plavix     Squamous cell " cancer of tongue  History noted. Cancer is not active. Outpatient follow up         VTE Risk Mitigation (From admission, onward)           Ordered     IP VTE HIGH RISK PATIENT  Once         11/20/23 1944     Place sequential compression device  Until discontinued         11/20/23 1944                    Discharge Planning   NISA: 12/1/2023     Code Status: Full Code   Is the patient medically ready for discharge?:     Reason for patient still in hospital (select all that apply): Treatment  Discharge Plan A: Home with family   Discharge Delays: None known at this time              Luiz Patel MD  Department of [facility] Medicine   [facility]

## 2023-11-28 NOTE — PLAN OF CARE
Problem: Occupational Therapy  Goal: Occupational Therapy Goal  Description: Goals to be met by: 12/6/23     Patient will increase functional independence with ADLs by performing:    LE Dressing with Modified [place].  Grooming while standing at sink with Modified [place].  Toileting from toilet with Modified [place] for hygiene and clothing management.   Step transfer with Modified [place]  Toilet transfer to toilet with Modified [place].  Upper extremity exercise program x15 reps per handout, with independence.  Implementation of PLB and energy conservation strategies into daily routines w/ (I).     Outcome: Ongoing, Progressing

## 2023-11-28 NOTE — NURSING
[facility]  Nurses Note -- 4 Eyes      11/28/2023       Skin assessed on: Q Shift      [x] No Pressure Injuries Present    [x]Prevention Measures Documented    [] Yes LDA  for Pressure Injury Previously documented     [] Yes New Pressure Injury Discovered   [] LDA for New Pressure Injury Added      Attending RN:  Mirian Garcia RN     Second RN:  Trupti TRIANA

## 2023-11-28 NOTE — CARE UPDATE
Received patient with a uncuffed 6 Shiley patient is on 8lpm 35% ATC, has moderate yellow thick secretions

## 2023-11-28 NOTE — SUBJECTIVE & OBJECTIVE
Interval History: weaned down to 7 L, doing ok, feeling thirsty    Review of Systems   HENT:  Negative for sore throat.    Respiratory:  Positive for cough and shortness of breath.    Cardiovascular:  Negative for chest pain.   Gastrointestinal:  Negative for abdominal pain.     Objective:     Vital Signs (Most Recent):  Temp: 97.5 °F (36.4 °C) (11/28/23 1138)  Pulse: 73 (11/28/23 1138)  Resp: 18 (11/28/23 1138)  BP: 135/67 (11/28/23 1138)  SpO2: 96 % (11/28/23 1138) Vital Signs (24h Range):  Temp:  [97.5 °F (36.4 °C)-98.2 °F (36.8 °C)] 97.5 °F (36.4 °C)  Pulse:  [65-86] 73  Resp:  [16-18] 18  SpO2:  [92 %-98 %] 96 %  BP: (118-140)/(55-78) 135/67     Weight: 56.6 kg (124 lb 12.5 oz)  Body mass index is 18.97 kg/m².    Intake/Output Summary (Last 24 hours) at 11/28/2023 1237  Last data filed at 11/28/2023 0939  Gross per 24 hour   Intake 980 ml   Output 800 ml   Net 180 ml           Physical Exam  Vitals and nursing note reviewed.   Constitutional:       General: He is not in acute distress.     Appearance: He is ill-appearing. He is not toxic-appearing.      Comments: Thin man   HENT:      Nose:      Comments: Scab on nose     Mouth/Throat:      Comments: Edentulous.   Neck:      Comments: Cuffless Trach in place  Cardiovascular:      Rate and Rhythm: Normal rate and regular rhythm.      Pulses: Normal pulses.      Heart sounds: Normal heart sounds.   Pulmonary:      Effort: Pulmonary effort is normal. No respiratory distress.      Breath sounds: Rales present. No rhonchi.      Comments: On trach collar 7 L currently  Coarse BS    Abdominal:      General: Bowel sounds are normal. There is no distension.      Palpations: Abdomen is soft.      Tenderness: There is no abdominal tenderness. There is no guarding.      Comments: PEG in place   Musculoskeletal:      Right lower leg: No edema.      Left lower leg: No edema.   Skin:     General: Skin is warm and dry.   Neurological:      Mental Status: He is alert. Mental  status is at baseline.      Comments: Awake and alert  Able write down needs             Significant Labs: All pertinent labs within the past 24 hours have been reviewed.    Significant Imaging: I have reviewed all pertinent imaging results/findings within the past 24 hours.

## 2023-11-28 NOTE — PT/OT/SLP PROGRESS
"Physical Therapy      Patient Name:  Fareed Richard Jr.   MRN:  [id number]    Patient not seen today secondary to pt resting in bed and mouthed "I walked twice around the room earlier, I would like to rest" then shook his head no multiple times. Pt was given option to sit EOB, pt declined. Will follow-up as able.    "

## 2023-11-28 NOTE — ASSESSMENT & PLAN NOTE
"Results for orders placed during the hospital encounter of 03/18/23    Echo    Interpretation Summary  · The left ventricle is normal in size with concentric hypertrophy and normal systolic function.  · The estimated ejection fraction is 60%. Inferobasal hypokinesis  · Indeterminate left ventricular diastolic function.  · Normal right ventricular size with normal right ventricular systolic function.  · Mild left atrial enlargement.  · Mild right atrial enlargement.  · Normal central venous pressure (3 mmHg).  · The estimated PA systolic pressure is 13 mmHg.  · The sinuses of Valsalva is mildly dilated.  BNP  No results for input(s): "BNP", "BNPTRIAGEBLO" in the last 168 hours.    Patient does not appear to be volume overloaded on exam  - lasix 40mg IV x2 given, monitor UOP  - Lasix PRN    "

## 2023-11-28 NOTE — PLAN OF CARE
Problem: Adult Inpatient Plan of Care  Goal: Plan of Care Review  Outcome: Ongoing, Progressing  Goal: Patient-Specific Goal (Individualized)  Outcome: Ongoing, Progressing  Goal: Absence of Hospital-Acquired Illness or Injury  Outcome: Ongoing, Progressing  Intervention: Identify and Manage Fall Risk  Flowsheets (Taken 11/28/2023 1749)  Safety Promotion/Fall Prevention:   assistive device/personal item within reach   Fall Risk reviewed with patient/family   in recliner, wheels locked  Intervention: Prevent Skin Injury  Flowsheets (Taken 11/28/2023 1749)  Body Position:   position changed independently   foot of bed elevated  Skin Protection: hydrocolloids used  Intervention: Prevent and Manage VTE (Venous Thromboembolism) Risk  Flowsheets (Taken 11/28/2023 1749)  Activity Management: Standing - L3  VTE Prevention/Management: fluids promoted  Goal: Optimal Comfort and Wellbeing  Outcome: Ongoing, Progressing  Intervention: Monitor Pain and Promote Comfort  Flowsheets (Taken 11/28/2023 1749)  Pain Management Interventions: care clustered

## 2023-11-28 NOTE — NURSING
[facility]  Nurses Note -- 4 Eyes      11/27/2023       Skin assessed on: Q Shift      [x] No Pressure Injuries Present    [x]Prevention Measures Documented    [] Yes LDA  for Pressure Injury Previously documented     [] Yes New Pressure Injury Discovered   [] LDA for New Pressure Injury Added      Attending RN:  Trupti Hall RN     Second RN:  KAMILAH Vela

## 2023-11-28 NOTE — ASSESSMENT & PLAN NOTE
Patient's COPD is with exacerbation noted by continued dyspnea and use of accessory muscles for breathing currently.  Patient is currently off COPD Pathway (ICU admit). Continue scheduled inhalers Steroids, Antibiotics, and Supplemental oxygen, scheduled nebs and monitor respiratory status closely.   - On AVAPS previously on admission , now on trach collar   - Pulmonary following  - changed solumedrol to prednisone to complete 5 days -Completed  - trach aspirate culture= Pseudomonas, continue zosyn for 7 days - Completed

## 2023-11-28 NOTE — PT/OT/SLP PROGRESS
"Occupational Therapy   Treatment    Name: Fareed Richard Jr.  MRN: [id number]  Admitting Diagnosis:  Acute on chronic respiratory failure with hypoxia       Recommendations:     Discharge Recommendations: Low Intensity Therapy  Discharge Equipment Recommendations:  none  Barriers to discharge:   (Pt on 5L at home; currently on 7L for maintianing spO2 >88%.)    Assessment:     Fareed Richard Jr. is a 73 y.o. male with a medical diagnosis of Acute on chronic respiratory failure with hypoxia.  Performance deficits affecting function are weakness, impaired endurance, impaired cardiopulmonary response to activity.     Pt w/ functional progress this date as he was able to ambulate in-room functional distances w/ SBA and use of RW; spO2 88% on 7L/35%. Pt w/ onset of fatigue, requiring seated rest break. Pt will continue to benefit from skilled acute OT services to maximize functional capacity or safe performance w/ ADLs and functional mobility.     Rehab Prognosis:  Good; patient would benefit from acute skilled OT services to address these deficits and reach maximum level of function.       Plan:     Patient to be seen 2 x/week to address the above listed problems via self-care/home management, therapeutic activities, therapeutic exercises  Plan of Care Expires: 12/06/23  Plan of Care Reviewed with: patient    Subjective     Chief Complaint: "I'm tired."   Patient/Family Comments/goals: "I want to walk but I can't with all this stuff."   Pain/Comfort:  Pain Rating 1: 0/10  Pain Rating Post-Intervention 1: 0/10    Objective:     Communicated with: Nurse prior to session.  Patient found HOB elevated with oxygen, PEG Tube, peripheral IV, Tracheostomy upon OT entry to room.    General Precautions: Standard, fall, respiratory    Orthopedic Precautions:N/A  Braces: N/A  Respiratory Status: 7L/35% w/ Tracheostomy      Occupational Performance:     Bed Mobility:    Patient completed Scooting hips to EOB with supervision and stand by " assistance  Patient completed Supine to Sit with supervision and stand by assistance     Functional Mobility/Transfers:  Patient completed Sit <> Stand Transfer with supervision and stand by assistance  with  no assistive device   Patient completed Bed <> Chair Transfer using Step Transfer technique with supervision and stand by assistance with no assistive device  Functional Mobility: Pt was able to ambulate from EOB>door>foot of bed (~15 ft, limited by trach tubing) and transferred to chair w/ SBA-supervision and use of RW. Pt w/ fatigue but spO2 88% on 7L; pt recovered to ~92% w/ rest.     Activities of Daily Living:  Upper Body Dressing: minimum assistance for donning gown over back       [facility] 6 Click ADL: 21    Treatment & Education:  -Pt performed ADLs and functional mobility as noted above.   -Pt educated on safe functional mobility and ADL performance.   -Pt educated on importance of PLB and energy conservation strategies for safe performance w/ ADLs and functional mobility.   -Questions and concerns addressed within scope.     Patient left up in chair with all lines intact and call button in reach    GOALS:   Multidisciplinary Problems       Occupational Therapy Goals          Problem: Occupational Therapy    Goal Priority Disciplines Outcome Interventions   Occupational Therapy Goal     OT, PT/OT Ongoing, Progressing    Description: Goals to be met by: 12/6/23     Patient will increase functional independence with ADLs by performing:    LE Dressing with Modified [place].  Grooming while standing at sink with Modified [place].  Toileting from toilet with Modified [place] for hygiene and clothing management.   Step transfer with Modified [place]  Toilet transfer to toilet with Modified [place].  Upper extremity exercise program x15 reps per handout, with independence.  Implementation of PLB and energy conservation strategies into daily routines w/ (I).                          Time  Tracking:     OT Date of Treatment: 11/28/23  OT Start Time: 1030  OT Stop Time: 1041  OT Total Time (min): 11 min    Billable Minutes:Therapeutic Activity 11  Total Time 11    OT/NELLY: OT          11/28/2023

## 2023-11-28 NOTE — PLAN OF CARE
Per MD, weaning oxygen. Home oxygen is 6 liters currently on 7 liters. Pt may discharge in 2 days. CM will assist as needed.     11/28/23 1341   Discharge Reassessment   Assessment Type Discharge Planning Reassessment   Did the patient's condition or plan change since previous assessment? Yes   Discharge Plan discussed with: Patient   Discharge Plan A Home with family   Discharge Plan B Other  (tbd)   DME Needed Upon Discharge  other (see comments)  (tbd)   Transition of Care Barriers None   Why the patient remains in the hospital Requires continued medical care   Post-Acute Status   Coverage [employer]   Discharge Delays None known at this time

## 2023-11-28 NOTE — ASSESSMENT & PLAN NOTE
Patient has hypernatremia which is uncontrolled. The hypernatremia is due to Dehydration. We will aim to correct the sodium by 8-10mEq in 24 hours. We will correct their hypernatremia with  The patient's sodium results have been reviewed and are listed below.  Recent Labs   Lab 11/28/23  0552   *     Add free water 250 ml every 4 hours - discussed with nursing staff

## 2023-11-28 NOTE — ASSESSMENT & PLAN NOTE
Patient with Hypoxic Respiratory failure which is Acute on chronic.  he is on home oxygen at 6 LPM trach collar. Supplemental oxygen was provided and noted-   Likely in the setting of COPD exacerbation/underlying pneumonia.   -Seen by ICU and Pulmonologist - stepped down to floor 11/24  -CXR unremarkable.   -Trach aspirate culture Pseudomonas  -Completed 7 days Zosyn, completed prednisone  -duonebs  -RT weaning oxygen accordingly to keep O2 88-92%

## 2023-11-29 ENCOUNTER — TELEPHONE (OUTPATIENT)
Dept: PULMONOLOGY | Facility: CLINIC | Age: 73
End: 2023-11-29
Payer: MEDICARE

## 2023-11-29 LAB
ANION GAP SERPL CALC-SCNC: 7 MMOL/L (ref 8–16)
BASOPHILS # BLD AUTO: 0.02 K/UL (ref 0–0.2)
BASOPHILS NFR BLD: 0.2 % (ref 0–1.9)
BUN SERPL-MCNC: 33 MG/DL (ref 8–23)
CALCIUM SERPL-MCNC: 9.9 MG/DL (ref 8.7–10.5)
CHLORIDE SERPL-SCNC: 107 MMOL/L (ref 95–110)
CO2 SERPL-SCNC: 35 MMOL/L (ref 23–29)
CREAT SERPL-MCNC: 1 MG/DL (ref 0.5–1.4)
DIFFERENTIAL METHOD BLD: ABNORMAL
EOSINOPHIL # BLD AUTO: 0.2 K/UL (ref 0–0.5)
EOSINOPHIL NFR BLD: 2.1 % (ref 0–8)
ERYTHROCYTE [DISTWIDTH] IN BLOOD BY AUTOMATED COUNT: 13.7 % (ref 11.5–14.5)
EST. GFR  (NO RACE VARIABLE): >60 ML/MIN/1.73 M^2
GLUCOSE SERPL-MCNC: 136 MG/DL (ref 70–110)
HCT VFR BLD AUTO: 39 % (ref 40–54)
HGB BLD-MCNC: 11.2 G/DL (ref 14–18)
IMM GRANULOCYTES # BLD AUTO: 0.01 K/UL (ref 0–0.04)
IMM GRANULOCYTES NFR BLD AUTO: 0.1 % (ref 0–0.5)
LYMPHOCYTES # BLD AUTO: 0.6 K/UL (ref 1–4.8)
LYMPHOCYTES NFR BLD: 7.2 % (ref 18–48)
MCH RBC QN AUTO: 27.5 PG (ref 27–31)
MCHC RBC AUTO-ENTMCNC: 28.7 G/DL (ref 32–36)
MCV RBC AUTO: 96 FL (ref 82–98)
MONOCYTES # BLD AUTO: 0.6 K/UL (ref 0.3–1)
MONOCYTES NFR BLD: 6.8 % (ref 4–15)
NEUTROPHILS # BLD AUTO: 6.8 K/UL (ref 1.8–7.7)
NEUTROPHILS NFR BLD: 83.6 % (ref 38–73)
NRBC BLD-RTO: 0 /100 WBC
PLATELET # BLD AUTO: 173 K/UL (ref 150–450)
PMV BLD AUTO: 10 FL (ref 9.2–12.9)
POCT GLUCOSE: 139 MG/DL (ref 70–110)
POCT GLUCOSE: 147 MG/DL (ref 70–110)
POCT GLUCOSE: 168 MG/DL (ref 70–110)
POCT GLUCOSE: 175 MG/DL (ref 70–110)
POTASSIUM SERPL-SCNC: 3.8 MMOL/L (ref 3.5–5.1)
RBC # BLD AUTO: 4.08 M/UL (ref 4.6–6.2)
SODIUM SERPL-SCNC: 149 MMOL/L (ref 136–145)
WBC # BLD AUTO: 8.14 K/UL (ref 3.9–12.7)

## 2023-11-29 PROCEDURE — 85025 COMPLETE CBC W/AUTO DIFF WBC: CPT | Performed by: HOSPITALIST

## 2023-11-29 PROCEDURE — 27200966 HC CLOSED SUCTION SYSTEM

## 2023-11-29 PROCEDURE — 25000242 PHARM REV CODE 250 ALT 637 W/ HCPCS: Performed by: HOSPITALIST

## 2023-11-29 PROCEDURE — 99900035 HC TECH TIME PER 15 MIN (STAT)

## 2023-11-29 PROCEDURE — 25000003 PHARM REV CODE 250: Performed by: HOSPITALIST

## 2023-11-29 PROCEDURE — 99900026 HC AIRWAY MAINTENANCE (STAT)

## 2023-11-29 PROCEDURE — 36415 COLL VENOUS BLD VENIPUNCTURE: CPT | Performed by: HOSPITALIST

## 2023-11-29 PROCEDURE — 21400001 HC TELEMETRY ROOM

## 2023-11-29 PROCEDURE — 80048 BASIC METABOLIC PNL TOTAL CA: CPT | Performed by: HOSPITALIST

## 2023-11-29 PROCEDURE — 27000221 HC OXYGEN, UP TO 24 HOURS

## 2023-11-29 PROCEDURE — 94640 AIRWAY INHALATION TREATMENT: CPT

## 2023-11-29 PROCEDURE — 94761 N-INVAS EAR/PLS OXIMETRY MLT: CPT

## 2023-11-29 PROCEDURE — 63600175 PHARM REV CODE 636 W HCPCS: Performed by: STUDENT IN AN ORGANIZED HEALTH CARE EDUCATION/TRAINING PROGRAM

## 2023-11-29 PROCEDURE — 25000003 PHARM REV CODE 250: Performed by: PHYSICIAN ASSISTANT

## 2023-11-29 RX ORDER — MORPHINE SULFATE 4 MG/ML
3 INJECTION, SOLUTION INTRAMUSCULAR; INTRAVENOUS EVERY 4 HOURS PRN
Status: DISCONTINUED | OUTPATIENT
Start: 2023-11-29 | End: 2023-12-01 | Stop reason: HOSPADM

## 2023-11-29 RX ORDER — OXYCODONE AND ACETAMINOPHEN 5; 325 MG/1; MG/1
1 TABLET ORAL EVERY 4 HOURS PRN
Status: DISCONTINUED | OUTPATIENT
Start: 2023-11-29 | End: 2023-12-01 | Stop reason: HOSPADM

## 2023-11-29 RX ADMIN — IPRATROPIUM BROMIDE AND ALBUTEROL SULFATE 3 ML: 2.5; .5 SOLUTION RESPIRATORY (INHALATION) at 01:11

## 2023-11-29 RX ADMIN — SODIUM CHLORIDE SOLN NEBU 3% 4 ML: 3 NEBU SOLN at 08:11

## 2023-11-29 RX ADMIN — BUDESONIDE 0.5 MG: 0.5 INHALANT RESPIRATORY (INHALATION) at 08:11

## 2023-11-29 RX ADMIN — ASPIRIN 81 MG CHEWABLE TABLET 81 MG: 81 TABLET CHEWABLE at 08:11

## 2023-11-29 RX ADMIN — SODIUM CHLORIDE SOLN NEBU 3% 4 ML: 3 NEBU SOLN at 01:11

## 2023-11-29 RX ADMIN — Medication 6 MG: at 09:11

## 2023-11-29 RX ADMIN — IPRATROPIUM BROMIDE AND ALBUTEROL SULFATE 3 ML: 2.5; .5 SOLUTION RESPIRATORY (INHALATION) at 08:11

## 2023-11-29 RX ADMIN — Medication 50 MG: at 08:11

## 2023-11-29 RX ADMIN — METOPROLOL TARTRATE 12.5 MG: 25 TABLET, FILM COATED ORAL at 08:11

## 2023-11-29 RX ADMIN — ARFORMOTEROL TARTRATE 15 MCG: 15 SOLUTION RESPIRATORY (INHALATION) at 08:11

## 2023-11-29 RX ADMIN — FAMOTIDINE 20 MG: 10 INJECTION INTRAVENOUS at 08:11

## 2023-11-29 RX ADMIN — FOLIC ACID 1 MG: 1 TABLET ORAL at 08:11

## 2023-11-29 RX ADMIN — METOPROLOL TARTRATE 12.5 MG: 25 TABLET, FILM COATED ORAL at 09:11

## 2023-11-29 RX ADMIN — ATORVASTATIN CALCIUM 40 MG: 40 TABLET, FILM COATED ORAL at 08:11

## 2023-11-29 RX ADMIN — CLOPIDOGREL BISULFATE 75 MG: 75 TABLET ORAL at 09:11

## 2023-11-29 RX ADMIN — MORPHINE SULFATE 3 MG: 4 INJECTION, SOLUTION INTRAMUSCULAR; INTRAVENOUS at 08:11

## 2023-11-29 RX ADMIN — FLUOXETINE HYDROCHLORIDE 20 MG: 20 SOLUTION ORAL at 08:11

## 2023-11-29 NOTE — PHYSICIAN QUERY
PT Name: Fareed Richard Jr.  MR #: [id number]     DOCUMENTATION CLARIFICATION     CDS: Alex Puentes RN CCDS               Contact information: [email or web address]     This form is a permanent document in the medical record.     Query Date: November 29, 2023    By submitting this query, we are merely seeking further clarification of documentation.  Please utilize your independent clinical judgment when addressing the question(s) below.    The Medical Record contains the following   Indicators Supporting Clinical Findings Location in Medical Record     x Heart Failure documented Chronic diastolic heart failure               Patient does not appear to be volume overloaded on exam   Acute on chronic diastolic heart failure  Patient does not appear to be volume overloaded on exam  - lasix 40mg IV x1 today, monitor UOP 11/20/2023 H&P    11/21/2023 Hospital Medicine progress notes     x BNP  11/20/23 16:06   BNP 1,708 (H)    Lab value    EF/Echo       x Radiology findings No significant change from the prior study    Improvement from 11/20/2023 but there may be some mild CHF. 11/20/2023 CXR    11/24/2023 CXR     x Subjective/Objective Respiratory Conditions Shortness of Breath  11/20/2023 ED provider notes    Recent/Current MI      Heart Transplant, LVAD      Edema, JVD      Ascites       x Diuretics/Meds furosemide 40 mg IV x 1  furosemide 20 mg PO qHS  furosemide 40 mg IV x 1  furosemide 40 mg IV x 1  metoprolol tartrate (LOPRESSOR) split tablet 12.5 mg PO BID 11/20/2023 Medications    11/21/2023 Medication  11/22/2023 Medication  11/20/2023- Current Medication     x Other Treatment Intake and output q1h  Weigh patient daily 11/20/2023- Current Order  11/21/2023- Current Order     x Other Acute on chronic respiratory failure with hypoxia  Right lower leg: No edema.   Left lower leg: No edema.      Right lower leg: No edema.    Left lower leg: No edema.    hypernatremia is due to Dehydration.  11/20/2023  H&P        11/21/2023 Hospital Medicine progress notes      11/26/2023 Hospital Medicine progress notes     Heart failure is a clinical diagnosis which includes symptomatic fluid retention, elevated intracardiac pressures, and/or the inability of the heart to deliver adequate blood flow.    Heart Failure with reduced Ejection Fraction (HFrEF) or Systolic Heart Failure (loses ability to contract normally, EF is <40%)    Heart Failure with preserved Ejection Fraction (HFpEF) or Diastolic Heart Failure (stiff ventricles, does not relax properly, EF is >50%)     Heart Failure with Combined Systolic and Diastolic Failure (stiff ventricles, does not relax properly and EF is <50%)    Mid-range or mildly reduced ejection fraction (HFmrEF) is classified as systolic heart failure.  Congestive heart failure with a recovered EF is classified as Diastolic Heart Failure.  Common clues to acute exacerbation:  Rapidly progressive symptoms (w/in 2 weeks of presentation), using IV diuretics, using supplemental O2, pulmonary edema on Xray, new or worsening pleural effusion, +JVD or other signs of volume overload, MI w/in 4 weeks, and/or BNP >500  The clinical guidelines noted are only system guidelines, and do not replace the providers clinical judgment.    Provider, please clarify the Acuity of Diastolic Heart Failure  associated with the above clinical findings.    [ x  ]  Acute on Chronic Diastolic Heart Failure (HFpEF) - worsening of CHF signs/symptoms in preexisting CHF   [   ]  Chronic Diastolic Heart Failure (HFpEF) - preexisting and stable   [   ]  Other (please specify): ___________________________________   [  ]  Clinically Undetermined         Please document in your progress notes daily for the duration of treatment until resolved and include in your discharge summary.    References:  American Heart Association editorial staff. (2017, May). Ejection Fraction Heart Failure Measurement. American Heart Association.  https://www.heart.org/en/health-topics/heart-failure/diagnosing-heart-failure/ejection-fraction-heart-failure-measurement#:~:text=Ejection%20fraction%20(EF)%20is%20a,pushed%20out%20with%20each%20heartbeat  ARNOLD Yang (2020, December 15). Heart failure with preserved ejection fraction: Clinical manifestations and diagnosis. docTrackr. https://www.magnetU.Betterific/contents/heart-failure-with-preserved-ejection-fraction-clinical-manifestations-and-diagnosis.  ICD-10-CM/PCS Coding Clinic Third Quarter ICD-10, Effective with discharges: September 8, 2020 [facility] § Heart failure with mid-range or mildly reduced ejection fraction (2020).  ICD-10-CM/PCS Coding Clinic Third Quarter ICD-10, Effective with discharges: September 8, 2020 [facility] § Heart failure with recovered ejection fraction (2020).  Form No. 98171

## 2023-11-29 NOTE — TELEPHONE ENCOUNTER
I called Mr Richard about a return visit to see Dr Wadsworth and he is in the telemetry unit at [facility]. I will let Dr Wadsworth know. Linn King

## 2023-11-29 NOTE — ASSESSMENT & PLAN NOTE
Patient has hypernatremia which is uncontrolled. The hypernatremia is due to Dehydration. We will aim to correct the sodium by 8-10mEq in 24 hours. We will correct their hypernatremia with  The patient's sodium results have been reviewed and are listed below.  Recent Labs   Lab 11/29/23  0506   *     Add free water 250 ml every 4 hours - discussed with nursing staff. Improving

## 2023-11-29 NOTE — SUBJECTIVE & OBJECTIVE
Interval History: placed back on 6 liters this AM and doing well    Review of Systems  Objective:     Vital Signs (Most Recent):  Temp: 97.5 °F (36.4 °C) (11/29/23 1647)  Pulse: 92 (11/29/23 1647)  Resp: 17 (11/29/23 1647)  BP: 122/86 (11/29/23 1647)  SpO2: 96 % (11/29/23 1647) Vital Signs (24h Range):  Temp:  [97.1 °F (36.2 °C)-98.2 °F (36.8 °C)] 97.5 °F (36.4 °C)  Pulse:  [66-92] 92  Resp:  [14-22] 17  SpO2:  [93 %-98 %] 96 %  BP: (110-164)/(59-92) 122/86     Weight: 53.9 kg (118 lb 13.3 oz)  Body mass index is 18.07 kg/m².    Intake/Output Summary (Last 24 hours) at 11/29/2023 1652  Last data filed at 11/29/2023 1643  Gross per 24 hour   Intake 1480 ml   Output 1625 ml   Net -145 ml           Physical Exam  Vitals and nursing note reviewed.   Constitutional:       General: He is not in acute distress.     Appearance: He is ill-appearing. He is not toxic-appearing.      Comments: Thin man   HENT:      Nose:      Comments: Scab on nose     Mouth/Throat:      Comments: Edentulous.   Neck:      Comments: Cuffless Trach in place  Cardiovascular:      Rate and Rhythm: Normal rate and regular rhythm.      Pulses: Normal pulses.      Heart sounds: Normal heart sounds.   Pulmonary:      Effort: Pulmonary effort is normal. No respiratory distress.      Breath sounds: Rales present. No rhonchi.      Comments: On trach collar 6 L currently  Coarse BS    Abdominal:      General: Bowel sounds are normal. There is no distension.      Palpations: Abdomen is soft.      Tenderness: There is no abdominal tenderness. There is no guarding.      Comments: PEG in place   Musculoskeletal:      Right lower leg: No edema.      Left lower leg: No edema.   Skin:     General: Skin is warm and dry.   Neurological:      Mental Status: He is alert. Mental status is at baseline.      Comments: Awake and alert  Able write down needs             Significant Labs: All pertinent labs within the past 24 hours have been reviewed.    Significant  Imaging: I have reviewed all pertinent imaging results/findings within the past 24 hours.

## 2023-11-29 NOTE — ASSESSMENT & PLAN NOTE
Patient's anemia is currently controlled. Has not received any PRBCs to date. 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

## 2023-11-29 NOTE — PROGRESS NOTES
[facility] Medicine  Progress Note    Patient Name: Fareed Richard Jr.  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     Patient presents with worsening shortness of breath that started 3 days prior to presentation.  He additionally reports worsening secretion from his tracheostomy.  Additional symptoms include wheezing.    In the ED, the patient was tachycardic (110), tachypneic and hypoxic (SpO2:  85%), and was placed on BiPAP.  Labs were remarkable for an elevated BNP (1708), elevated troponin (0.030).  VBG showed a pH of 7.36, pCO2 of 60.3, HC03 of 35.8.  Chest x-ray showed no significant change from prior studies.  Patient was given Lasix 40 mg IV, Solu-Medrol 80 mg IV, vancomycin and Zosyn.  He was admitted for further management.    Overview/Hospital Course:  Mr Fareed Richard Jr. Was admitted with acute hypoxic respiratory failure secondary to Pseudomonas PNA and COPD exacerbation. On oxygen 6 L trach collar and tube feeding at home. Started BiPAP, zosyn, and admitted to ICU. Stepped down to floor 11/24. Lung coarse and still requiring 8 L oxygen. Continue zoysn (day 6 of 7), duoneb/NS 3% neb, PO prednisone. RT following closely and wean to keep 88-90%.  Tolerating TF at goal 40 cc/hr isosource. PT/OT evaluation completed. Low intensity on discharge. Completed 7 day course antibx for Pseudomonas pneumonia. Slowly weaning O2 to home.    Interval History: placed back on 6 liters this AM and doing well    Review of Systems  Objective:     Vital Signs (Most  Recent):  Temp: 97.5 °F (36.4 °C) (11/29/23 1647)  Pulse: 92 (11/29/23 1647)  Resp: 17 (11/29/23 1647)  BP: 122/86 (11/29/23 1647)  SpO2: 96 % (11/29/23 1647) Vital Signs (24h Range):  Temp:  [97.1 °F (36.2 °C)-98.2 °F (36.8 °C)] 97.5 °F (36.4 °C)  Pulse:  [66-92] 92  Resp:  [14-22] 17  SpO2:  [93 %-98 %] 96 %  BP: (110-164)/(59-92) 122/86     Weight: 53.9 kg (118 lb 13.3 oz)  Body mass index is 18.07 kg/m².    Intake/Output Summary (Last 24 hours) at 11/29/2023 1652  Last data filed at 11/29/2023 1643  Gross per 24 hour   Intake 1480 ml   Output 1625 ml   Net -145 ml           Physical Exam  Vitals and nursing note reviewed.   Constitutional:       General: He is not in acute distress.     Appearance: He is ill-appearing. He is not toxic-appearing.      Comments: Thin man   HENT:      Nose:      Comments: Scab on nose     Mouth/Throat:      Comments: Edentulous.   Neck:      Comments: Cuffless Trach in place  Cardiovascular:      Rate and Rhythm: Normal rate and regular rhythm.      Pulses: Normal pulses.      Heart sounds: Normal heart sounds.   Pulmonary:      Effort: Pulmonary effort is normal. No respiratory distress.      Breath sounds: Rales present. No rhonchi.      Comments: On trach collar 6 L currently  Coarse BS    Abdominal:      General: Bowel sounds are normal. There is no distension.      Palpations: Abdomen is soft.      Tenderness: There is no abdominal tenderness. There is no guarding.      Comments: PEG in place   Musculoskeletal:      Right lower leg: No edema.      Left lower leg: No edema.   Skin:     General: Skin is warm and dry.   Neurological:      Mental Status: He is alert. Mental status is at baseline.      Comments: Awake and alert  Able write down needs             Significant Labs: All pertinent labs within the past 24 hours have been reviewed.    Significant Imaging: I have reviewed all pertinent imaging results/findings within the past 24 hours.    Assessment/Plan:      * Acute on  "chronic respiratory failure with hypoxia  Patient with Hypoxic Respiratory failure which is Acute on chronic.  he is on home oxygen at 6 LPM trach collar. Supplemental oxygen was provided and noted-   Likely in the setting of COPD exacerbation/underlying pneumonia.   -Seen by ICU and Pulmonologist - stepped down to floor 11/24  -CXR unremarkable.   -Trach aspirate culture Pseudomonas  -Completed 7 days Zosyn, completed prednisone  -duonebs  -RT weaning oxygen accordingly to keep O2 88-92%  - Goal to wean back to home oxygen 6 l - if stable today, plan to d/c tomorrow    Physical deconditioning  PT/OT following  Low intensity on discharge      Pneumonia due to Pseudomonas species  Trach aspirate with Pseudomonas  - zosyn x7 days - completed      Normocytic anemia  Patient's anemia is currently controlled. Has not received any PRBCs to date. 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    Acute on chronic diastolic heart failure  Results for orders placed during the hospital encounter of 03/18/23    Echo    Interpretation Summary  · The left ventricle is normal in size with concentric hypertrophy and normal systolic function.  · The estimated ejection fraction is 60%. Inferobasal hypokinesis  · Indeterminate left ventricular diastolic function.  · Normal right ventricular size with normal right ventricular systolic function.  · Mild left atrial enlargement.  · Mild right atrial enlargement.  · Normal central venous pressure (3 mmHg).  · The estimated PA systolic pressure is 13 mmHg.  · The sinuses of Valsalva is mildly dilated.  BNP  No results for input(s): "BNP", "BNPTRIAGEBLO" in the last 168 hours.    Patient does not appear to be volume overloaded on exam  - lasix 40mg IV x2 " given, monitor UOP  - Lasix PRN      PEG (percutaneous endoscopic gastrostomy) status  History noted.     Tracheostomy present  Currently has cuffless Shiley 6.0      ACP (advance care planning)  Advance Care Planning    Date: 11/21/2023  Confirmed full code status           Hypernatremia  Patient has hypernatremia which is uncontrolled. The hypernatremia is due to Dehydration. We will aim to correct the sodium by 8-10mEq in 24 hours. We will correct their hypernatremia with  The patient's sodium results have been reviewed and are listed below.  Recent Labs   Lab 11/29/23  0506   *     Add free water 250 ml every 4 hours - discussed with nursing staff. Improving    Severe protein-calorie malnutrition  Nutrition consulted. Most recent weight and BMI monitored-   Measurements:  Wt Readings from Last 1 Encounters:   11/25/23 56.6 kg (124 lb 12.5 oz)   Body mass index is 18.97 kg/m².  RD following - at goal 40 ml/hr isosource 1.7  Add additional free water as Na 149     COPD exacerbation  Patient's COPD is with exacerbation noted by continued dyspnea and use of accessory muscles for breathing currently.  Patient is currently off COPD Pathway (ICU admit). Continue scheduled inhalers Steroids, Antibiotics, and Supplemental oxygen, scheduled nebs and monitor respiratory status closely.   - On AVAPS previously on admission , now on trach collar   - Pulmonary following  - changed solumedrol to prednisone to complete 5 days -Completed  - trach aspirate culture= Pseudomonas, continue zosyn for 7 days - Completed    Other hyperlipidemia  Continue statin      Primary hypertension  BP is currently well controlled  - continue metoprolol     Coronary artery disease involving native coronary artery of native heart without angina pectoris  No acute issues.  No signs of ACS  - continue asa, statin, plavix     Squamous cell cancer of tongue  History noted. Cancer is not active. Outpatient follow up         VTE Risk Mitigation  (From admission, onward)           Ordered     IP VTE HIGH RISK PATIENT  Once         11/20/23 1944     Place sequential compression device  Until discontinued         11/20/23 1944                    Discharge Planning   NISA: 12/1/2023     Code Status: Full Code   Is the patient medically ready for discharge?:     Reason for patient still in hospital (select all that apply): Treatment  Discharge Plan A: Home with family   Discharge Delays: None known at this time              Luiz Patel MD  Department of [facility] Medicine   [facility]

## 2023-11-29 NOTE — PHYSICIAN QUERY
PT Name: Fareed Richard Jr.  MR #: [id number]     Documentation Clarification      CDS: Alex Puentes RN CCDS               Contact information: [email or web address]     This form is a permanent document in the medical record.     Query Date: November 29, 2023    By submitting this query, we are merely seeking further clarification of documentation. Please utilize your independent clinical judgment when addressing the question(s) below.    The Medical Record reflects the following:    Clinical Findings Location in Medical Records   Severe protein-calorie malnutrition   Malnutrition Type:  Context:    Level:       Malnutrition Characteristic Summary:        Interventions/Recommendations (treatment strategy):    PEG (percutaneous endoscopic gastrostomy) status  History noted.   Tracheostomy present  History noted.   Squamous cell cancer of tongue  History noted.  11/20/2023 H&P   Severe protein-calorie malnutrition  Nutrition consulted. Most recent weight and BMI monitored-      Measurements:      Wt Readings from Last 1 Encounters:   11/21/23 59.1 kg (130 lb 4.7 oz)   Body mass index is 19.81 kg/m².     Patient has been screened and assessed by RD.  - resume tube feeding today  11/21/2023 Hospital Medicine progress notes   Nutrition Problem  Decreased oral intake     Related to (etiology):   Diagnosis related symptoms     Signs and Symptoms (as evidenced by):   NPO status  'Tube Feeding     Interventions/Recommendations (treatment strategy):  Collaboration with medical providers     Pt has no recent weight changes.   % Intake of Estimated Energy Needs: 0 - 25 %  % Meal Intake: NPO    Recommendation/Intervention: 1. Consider TF sosource 1.5. Continue to advance TF to GR 40 mLhr as tolerated. 2. Continue to monitor NPO status. 3. Monitor weights and labs. 4. Collaboration with medical providers  Goals: 1. Pt to meet % EEN/EPN by RD follow up  Nutrition Goal Status: new 11/21/2023 RD consult     Academy of  Nutrition and Dietetics (Academy) and the American Society for Parenteral and Enteral Nutrition (A.S.P.E.N.) Clinical Characteristics to support Malnutrition      Criteria for mild malnutrition is defined as 1 characteristic outlined above within the established moderate or severe parameters.  A minimum of 2 out of the 6 characteristics noted above are recommended for a diagnosis of moderate or severe malnutrition.  Chronic illness/injury is a disease/condition lasting 3 months or longer.    The noted clinical guidelines are only system guidelines and do not replace the providers clinical judgment.      Due to the conflicting clinical picture, please clinically validate the diagnosis of Severe Protein-Calorie Malnutrition .    If validated, please provide additional clinical support for the diagnosis.       [   ] Severe Protein-Calorie Malnutrition is not confirmed and/or it has been ruled out, other diagnosis ruled in:       [   ] Decreased oral intake     [   ] Other (please specify):_______________   [   ] Severe Protein-Calorie Malnutrition is not confirmed and/or it has been ruled out   [   ] Severe Protein-Calorie Malnutrition is confirmed. Please specify clinical support (signs, symptoms, and treatment) for  the confirmed diagnosis: ____________________   [  x ] Other clarification (please specify): has hx of severe protein calorie malnutrition, has not gained much weight but is taking in appropriate tube feeds. Meets criteria for severe muscle and subcutaneous fat loss     References:    ANA Herrera., & SHIRLEY Lancaster. (2022, April). Assessment and management of anorexia and cachexia in palliative care. Retrieved May 23, 2022, from https://[email or web address]/contents/assessment-and-management-of-anorexia-and-cachexia-in-palliative-care?[email or web address]&source=see_link     SHANNAN Contreras, PhD, RD, TARIK, DINESH Pittman, PhD, RN, JESSE Campo MD, PhD, Mathew MONTANO A., MS, RD, CNSC, MAYTE Keys, MS, RD, The Academy  Malnutrition Work Group, The A.S.P.E.N. Board of Directors. (2012). Consensus Statement: Academy of Nutrition and Dietetics and American Society for Parenteral and Enteral Nutrition: Characteristics Recommended for the Identification and Documentation of Adult Malnutrition (Undernutrition). Journal of Parenteral and Enteral Nutrition, 36(3), 275-283. doi:10.1177/2661986102560767     Form No. 45365

## 2023-11-29 NOTE — PLAN OF CARE
Problem: Adult Inpatient Plan of Care  Goal: Plan of Care Review  Outcome: Ongoing, Progressing     Problem: Diabetes Comorbidity  Goal: Blood Glucose Level Within Targeted Range  Outcome: Ongoing, Progressing     Problem: Fluid Imbalance (Pneumonia)  Goal: Fluid Balance  Outcome: Ongoing, Progressing     Problem: Respiratory Compromise (Pneumonia)  Goal: Effective Oxygenation and Ventilation  Outcome: Ongoing, Progressing     Problem: Infection (Pneumonia)  Goal: Resolution of Infection Signs and Symptoms  Outcome: Met

## 2023-11-29 NOTE — NURSING
[facility], [facility]  Nurses Note -- 4 Eyes      11/28/2023       Skin assessed on: Q Shift      [x] No Pressure Injuries Present    [x]Prevention Measures Documented    [] Yes LDA  for Pressure Injury Previously documented     [] Yes New Pressure Injury Discovered   [] LDA for New Pressure Injury Added      Attending RN:  Trupti Hall RN     Second RN:  KAMILAH Vela

## 2023-11-29 NOTE — ASSESSMENT & PLAN NOTE
Patient with Hypoxic Respiratory failure which is Acute on chronic.  he is on home oxygen at 6 LPM trach collar. Supplemental oxygen was provided and noted-   Likely in the setting of COPD exacerbation/underlying pneumonia.   -Seen by ICU and Pulmonologist - stepped down to floor 11/24  -CXR unremarkable.   -Trach aspirate culture Pseudomonas  -Completed 7 days Zosyn, completed prednisone  -duonebs  -RT weaning oxygen accordingly to keep O2 88-92%  - Goal to wean back to home oxygen 6 l - if stable today, plan to d/c tomorrow

## 2023-11-30 LAB
ANION GAP SERPL CALC-SCNC: 12 MMOL/L (ref 8–16)
BUN SERPL-MCNC: 37 MG/DL (ref 8–23)
CALCIUM SERPL-MCNC: 10.1 MG/DL (ref 8.7–10.5)
CHLORIDE SERPL-SCNC: 109 MMOL/L (ref 95–110)
CO2 SERPL-SCNC: 33 MMOL/L (ref 23–29)
CREAT SERPL-MCNC: 1 MG/DL (ref 0.5–1.4)
EST. GFR  (NO RACE VARIABLE): >60 ML/MIN/1.73 M^2
GLUCOSE SERPL-MCNC: 151 MG/DL (ref 70–110)
POCT GLUCOSE: 149 MG/DL (ref 70–110)
POCT GLUCOSE: 149 MG/DL (ref 70–110)
POCT GLUCOSE: 170 MG/DL (ref 70–110)
POTASSIUM SERPL-SCNC: 3.5 MMOL/L (ref 3.5–5.1)
SODIUM SERPL-SCNC: 154 MMOL/L (ref 136–145)

## 2023-11-30 PROCEDURE — 94640 AIRWAY INHALATION TREATMENT: CPT

## 2023-11-30 PROCEDURE — 21400001 HC TELEMETRY ROOM

## 2023-11-30 PROCEDURE — 27000221 HC OXYGEN, UP TO 24 HOURS

## 2023-11-30 PROCEDURE — 25000003 PHARM REV CODE 250: Performed by: PHYSICIAN ASSISTANT

## 2023-11-30 PROCEDURE — 25000242 PHARM REV CODE 250 ALT 637 W/ HCPCS: Performed by: HOSPITALIST

## 2023-11-30 PROCEDURE — 27200966 HC CLOSED SUCTION SYSTEM

## 2023-11-30 PROCEDURE — 97530 THERAPEUTIC ACTIVITIES: CPT | Mod: CQ

## 2023-11-30 PROCEDURE — 94761 N-INVAS EAR/PLS OXIMETRY MLT: CPT

## 2023-11-30 PROCEDURE — 80048 BASIC METABOLIC PNL TOTAL CA: CPT | Performed by: HOSPITALIST

## 2023-11-30 PROCEDURE — 36415 COLL VENOUS BLD VENIPUNCTURE: CPT | Performed by: HOSPITALIST

## 2023-11-30 PROCEDURE — 25000003 PHARM REV CODE 250: Performed by: HOSPITALIST

## 2023-11-30 PROCEDURE — 99900035 HC TECH TIME PER 15 MIN (STAT)

## 2023-11-30 RX ADMIN — IPRATROPIUM BROMIDE AND ALBUTEROL SULFATE 3 ML: 2.5; .5 SOLUTION RESPIRATORY (INHALATION) at 07:11

## 2023-11-30 RX ADMIN — CLOPIDOGREL BISULFATE 75 MG: 75 TABLET ORAL at 08:11

## 2023-11-30 RX ADMIN — BUDESONIDE 0.5 MG: 0.5 INHALANT RESPIRATORY (INHALATION) at 08:11

## 2023-11-30 RX ADMIN — ATORVASTATIN CALCIUM 40 MG: 40 TABLET, FILM COATED ORAL at 09:11

## 2023-11-30 RX ADMIN — IPRATROPIUM BROMIDE AND ALBUTEROL SULFATE 3 ML: 2.5; .5 SOLUTION RESPIRATORY (INHALATION) at 12:11

## 2023-11-30 RX ADMIN — ARFORMOTEROL TARTRATE 15 MCG: 15 SOLUTION RESPIRATORY (INHALATION) at 08:11

## 2023-11-30 RX ADMIN — ARFORMOTEROL TARTRATE 15 MCG: 15 SOLUTION RESPIRATORY (INHALATION) at 07:11

## 2023-11-30 RX ADMIN — SODIUM CHLORIDE SOLN NEBU 3% 4 ML: 3 NEBU SOLN at 02:11

## 2023-11-30 RX ADMIN — SODIUM CHLORIDE SOLN NEBU 3% 4 ML: 3 NEBU SOLN at 08:11

## 2023-11-30 RX ADMIN — FOLIC ACID 1 MG: 1 TABLET ORAL at 09:11

## 2023-11-30 RX ADMIN — FLUOXETINE HYDROCHLORIDE 20 MG: 20 SOLUTION ORAL at 09:11

## 2023-11-30 RX ADMIN — Medication 6 MG: at 08:11

## 2023-11-30 RX ADMIN — BUDESONIDE 0.5 MG: 0.5 INHALANT RESPIRATORY (INHALATION) at 07:11

## 2023-11-30 RX ADMIN — METOPROLOL TARTRATE 12.5 MG: 25 TABLET, FILM COATED ORAL at 09:11

## 2023-11-30 RX ADMIN — FAMOTIDINE 20 MG: 10 INJECTION INTRAVENOUS at 09:11

## 2023-11-30 RX ADMIN — IPRATROPIUM BROMIDE AND ALBUTEROL SULFATE 3 ML: 2.5; .5 SOLUTION RESPIRATORY (INHALATION) at 08:11

## 2023-11-30 RX ADMIN — ASPIRIN 81 MG CHEWABLE TABLET 81 MG: 81 TABLET CHEWABLE at 09:11

## 2023-11-30 RX ADMIN — Medication 50 MG: at 09:11

## 2023-11-30 RX ADMIN — SODIUM CHLORIDE SOLN NEBU 3% 4 ML: 3 NEBU SOLN at 07:11

## 2023-11-30 RX ADMIN — IPRATROPIUM BROMIDE AND ALBUTEROL SULFATE 3 ML: 2.5; .5 SOLUTION RESPIRATORY (INHALATION) at 02:11

## 2023-11-30 NOTE — ASSESSMENT & PLAN NOTE
Patient with Hypoxic Respiratory failure which is Acute on chronic.  he is on home oxygen at 6 LPM trach collar. Supplemental oxygen was provided and noted-   Likely in the setting of COPD exacerbation/underlying pneumonia.   -Seen by ICU and Pulmonologist - stepped down to floor 11/24  -CXR unremarkable.   -Trach aspirate culture Pseudomonas  -Completed 7 days Zosyn, completed prednisone  -duonebs  -RT weaning oxygen accordingly to keep O2 88-92%  - Goal to wean back to home oxygen 6 l - doing well. Discussed with patient, plan to d/c 12/1

## 2023-11-30 NOTE — NURSING
[facility], [facility] [place]  Nurses Note -- 4 Eyes      11/30/2023       Skin assessed on: Q Shift      [x] No Pressure Injuries Present    [x]Prevention Measures Documented    [] Yes LDA  for Pressure Injury Previously documented     [] Yes New Pressure Injury Discovered   [] LDA for New Pressure Injury Added      Attending RN:  Eugenia Donohue RN     Second RN:  Keily MOLINA RN

## 2023-11-30 NOTE — NURSING
Bedside handoff report received from off going nurse. Pt lying in bed, NAD noted. Fall/safety precautions maintained. Call light and personal belongings within reach. Communication board updated. Will continue to monitor.

## 2023-11-30 NOTE — PLAN OF CARE
Pt is AAOx4. 5 L per trach. Tele maintained.  No falls or injuries reported during shift, safety precautions maintained.    Problem: Adult Inpatient Plan of Care  Goal: Plan of Care Review  Outcome: Ongoing, Progressing     Problem: Adult Inpatient Plan of Care  Goal: Optimal Comfort and Wellbeing  Outcome: Ongoing, Progressing

## 2023-11-30 NOTE — SUBJECTIVE & OBJECTIVE
Interval History: doing well this morning, working on weaning oxygen. Plans for d/c tomorrow morning    Review of Systems  Objective:     Vital Signs (Most Recent):  Temp: 97.9 °F (36.6 °C) (11/30/23 1108)  Pulse: 79 (11/30/23 1108)  Resp: 18 (11/30/23 1108)  BP: (!) 170/90 (11/30/23 1108)  SpO2: 95 % (11/30/23 1108) Vital Signs (24h Range):  Temp:  [97.5 °F (36.4 °C)-97.9 °F (36.6 °C)] 97.9 °F (36.6 °C)  Pulse:  [73-92] 79  Resp:  [17-20] 18  SpO2:  [92 %-97 %] 95 %  BP: (122-186)/(69-90) 170/90     Weight: 55.5 kg (122 lb 5.7 oz)  Body mass index is 18.6 kg/m².    Intake/Output Summary (Last 24 hours) at 11/30/2023 1353  Last data filed at 11/30/2023 1120  Gross per 24 hour   Intake 750 ml   Output 1025 ml   Net -275 ml           Physical Exam  Vitals and nursing note reviewed.   Constitutional:       General: He is not in acute distress.     Appearance: He is ill-appearing. He is not toxic-appearing.      Comments: Thin man   HENT:      Nose:      Comments: Scab on nose     Mouth/Throat:      Comments: Edentulous.   Neck:      Comments: Cuffless Trach in place  Cardiovascular:      Rate and Rhythm: Normal rate and regular rhythm.      Pulses: Normal pulses.      Heart sounds: Normal heart sounds.   Pulmonary:      Effort: Pulmonary effort is normal. No respiratory distress.      Breath sounds: Rales present. No rhonchi.      Comments: On 6 L trach collar    Abdominal:      General: Bowel sounds are normal. There is no distension.      Palpations: Abdomen is soft.      Tenderness: There is no abdominal tenderness. There is no guarding.      Comments: PEG in place   Musculoskeletal:      Right lower leg: No edema.      Left lower leg: No edema.   Skin:     General: Skin is warm and dry.   Neurological:      Mental Status: He is alert. Mental status is at baseline.      Comments: Awake and alert  Able write down needs             Significant Labs: All pertinent labs within the past 24 hours have been  reviewed.    Significant Imaging: I have reviewed all pertinent imaging results/findings within the past 24 hours.

## 2023-11-30 NOTE — PROGRESS NOTES
[facility] Medicine  Progress Note    Patient Name: Fareed Richard Jr.  MRN: [id number]  Patient Class: IP- Inpatient   Admission Date: 11/20/2023  Length of Stay: 10 days  Attending Physician: Luiz Patel MD  Primary Care Provider: Kodi Tubbs MD        Subjective:     Principal Problem:Acute on chronic respiratory failure with hypoxia        HPI:  This is a 73-year-old male with a past medical history of squamous cell carcinoma of the tongue s/p tracheostomy and PEG, CAD, COPD, hyperlipidemia, anxiety who presents with shortness of breath.     Patient presents with worsening shortness of breath that started 3 days prior to presentation.  He additionally reports worsening secretion from his tracheostomy.  Additional symptoms include wheezing.    In the ED, the patient was tachycardic (110), tachypneic and hypoxic (SpO2:  85%), and was placed on BiPAP.  Labs were remarkable for an elevated BNP (1708), elevated troponin (0.030).  VBG showed a pH of 7.36, pCO2 of 60.3, HC03 of 35.8.  Chest x-ray showed no significant change from prior studies.  Patient was given Lasix 40 mg IV, Solu-Medrol 80 mg IV, vancomycin and Zosyn.  He was admitted for further management.    Overview/Hospital Course:  Mr Fareed Richard Jr. Was admitted with acute hypoxic respiratory failure secondary to Pseudomonas PNA and COPD exacerbation. On oxygen 6 L trach collar and tube feeding at home. Started BiPAP, zosyn, and admitted to ICU. Stepped down to floor 11/24. Lung coarse and still requiring 8 L oxygen. Continue zoysn (day 6 of 7), duoneb/NS 3% neb, PO prednisone. RT following closely and wean to keep 88-90%.  Tolerating TF at goal 40 cc/hr isosource. PT/OT evaluation completed. Low intensity on discharge. Completed 7 day course antibx for Pseudomonas pneumonia. Slowly weaning O2 to home.    Interval History: doing well this morning, working on weaning oxygen. Plans for d/c tomorrow morning    Review of  Systems  Objective:     Vital Signs (Most Recent):  Temp: 97.9 °F (36.6 °C) (11/30/23 1108)  Pulse: 79 (11/30/23 1108)  Resp: 18 (11/30/23 1108)  BP: (!) 170/90 (11/30/23 1108)  SpO2: 95 % (11/30/23 1108) Vital Signs (24h Range):  Temp:  [97.5 °F (36.4 °C)-97.9 °F (36.6 °C)] 97.9 °F (36.6 °C)  Pulse:  [73-92] 79  Resp:  [17-20] 18  SpO2:  [92 %-97 %] 95 %  BP: (122-186)/(69-90) 170/90     Weight: 55.5 kg (122 lb 5.7 oz)  Body mass index is 18.6 kg/m².    Intake/Output Summary (Last 24 hours) at 11/30/2023 1353  Last data filed at 11/30/2023 1120  Gross per 24 hour   Intake 750 ml   Output 1025 ml   Net -275 ml           Physical Exam  Vitals and nursing note reviewed.   Constitutional:       General: He is not in acute distress.     Appearance: He is ill-appearing. He is not toxic-appearing.      Comments: Thin man   HENT:      Nose:      Comments: Scab on nose     Mouth/Throat:      Comments: Edentulous.   Neck:      Comments: Cuffless Trach in place  Cardiovascular:      Rate and Rhythm: Normal rate and regular rhythm.      Pulses: Normal pulses.      Heart sounds: Normal heart sounds.   Pulmonary:      Effort: Pulmonary effort is normal. No respiratory distress.      Breath sounds: Rales present. No rhonchi.      Comments: On 6 L trach collar    Abdominal:      General: Bowel sounds are normal. There is no distension.      Palpations: Abdomen is soft.      Tenderness: There is no abdominal tenderness. There is no guarding.      Comments: PEG in place   Musculoskeletal:      Right lower leg: No edema.      Left lower leg: No edema.   Skin:     General: Skin is warm and dry.   Neurological:      Mental Status: He is alert. Mental status is at baseline.      Comments: Awake and alert  Able write down needs             Significant Labs: All pertinent labs within the past 24 hours have been reviewed.    Significant Imaging: I have reviewed all pertinent imaging results/findings within the past 24  "hours.    Assessment/Plan:      * Acute on chronic respiratory failure with hypoxia  Patient with Hypoxic Respiratory failure which is Acute on chronic.  he is on home oxygen at 6 LPM trach collar. Supplemental oxygen was provided and noted-   Likely in the setting of COPD exacerbation/underlying pneumonia.   -Seen by ICU and Pulmonologist - stepped down to floor 11/24  -CXR unremarkable.   -Trach aspirate culture Pseudomonas  -Completed 7 days Zosyn, completed prednisone  -duonebs  -RT weaning oxygen accordingly to keep O2 88-92%  - Goal to wean back to home oxygen 6 l - doing well. Discussed with patient, plan to d/c 12/1    Physical deconditioning  PT/OT following  Low intensity on discharge      Pneumonia due to Pseudomonas species  Trach aspirate with Pseudomonas  - zosyn x7 days - completed      Normocytic anemia  Patient's anemia is currently controlled. Has not received any PRBCs to date. 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    Acute on chronic diastolic heart failure  Results for orders placed during the hospital encounter of 03/18/23    Echo    Interpretation Summary  · The left ventricle is normal in size with concentric hypertrophy and normal systolic function.  · The estimated ejection fraction is 60%. Inferobasal hypokinesis  · Indeterminate left ventricular diastolic function.  · Normal right ventricular size with normal right ventricular systolic function.  · Mild left atrial enlargement.  · Mild right atrial enlargement.  · Normal central venous pressure (3 mmHg).  · The estimated PA systolic pressure is 13 mmHg.  · The sinuses of Valsalva is mildly dilated.  BNP  No results for input(s): "BNP", "BNPTRIAGEBLO" in the last 168 hours.    Patient does not " appear to be volume overloaded on exam  - lasix 40mg IV x2 given, monitor UOP  - Lasix PRN      PEG (percutaneous endoscopic gastrostomy) status  History noted.     Tracheostomy present  Currently has cuffless Shiley 6.0      ACP (advance care planning)  Advance Care Planning    Date: 11/21/2023  Confirmed full code status           Hypernatremia  Patient has hypernatremia which is uncontrolled. The hypernatremia is due to Dehydration. We will aim to correct the sodium by 8-10mEq in 24 hours. We will correct their hypernatremia with  The patient's sodium results have been reviewed and are listed below.  Recent Labs   Lab 11/30/23  0452   *     Add free water 250 ml every 4 hours - discussed with nursing staff. Improving  - now back up to 154? Unclear etiology. He has been receiving his free water flushes.     Severe protein-calorie malnutrition  Nutrition consulted. Most recent weight and BMI monitored-   Measurements:  Wt Readings from Last 1 Encounters:   11/25/23 56.6 kg (124 lb 12.5 oz)   Body mass index is 18.97 kg/m².  RD following - at goal 40 ml/hr isosource 1.7  Add additional free water as Na 149     COPD exacerbation  Patient's COPD is with exacerbation noted by continued dyspnea and use of accessory muscles for breathing currently.  Patient is currently off COPD Pathway (ICU admit). Continue scheduled inhalers Steroids, Antibiotics, and Supplemental oxygen, scheduled nebs and monitor respiratory status closely.   - On AVAPS previously on admission , now on trach collar   - Pulmonary following  - changed solumedrol to prednisone to complete 5 days -Completed  - trach aspirate culture= Pseudomonas, continue zosyn for 7 days - Completed    Other hyperlipidemia  Continue statin      Primary hypertension  BP is currently well controlled  - continue metoprolol     Coronary artery disease involving native coronary artery of native heart without angina pectoris  No acute issues.  No signs of ACS  -  continue asa, statin, plavix     Squamous cell cancer of tongue  History noted. Cancer is not active. Outpatient follow up         VTE Risk Mitigation (From admission, onward)           Ordered     IP VTE HIGH RISK PATIENT  Once         11/20/23 1944     Place sequential compression device  Until discontinued         11/20/23 1944                    Discharge Planning   NISA: 12/1/2023     Code Status: Full Code   Is the patient medically ready for discharge?:     Reason for patient still in hospital (select all that apply): Treatment  Discharge Plan A: Home with family   Discharge Delays: None known at this time              Luiz Patel MD  Department of Hospital Medicine   [facility]

## 2023-11-30 NOTE — PT/OT/SLP PROGRESS
Occupational Therapy      Patient Name:  Fareed Richard Jr.   MRN:  [id number]    Patient not seen today secondary to pt adamantly refusing as he was being assisted back to bed w/ PCT present  . Will follow-up as able.    11/30/2023

## 2023-11-30 NOTE — PLAN OF CARE
Recommendations    1. Continue TF Isosource 1.5. @ GR 40 mLhr as tolerated.   2. Continue to monitor NPO status.   3. Monitor weights and labs.   4. Collaboration with medical providers    Goals: 1. Pt to meet % EEN/EPN by RD follow up  Nutrition Goal Status: goal met  Communication of RD Recs:  (POC)    Assessment and Plan    Nutrition Problem  Decreased oral intake    Related to (etiology):   Diagnosis related symptoms    Signs and Symptoms (as evidenced by):   NPO status  'Tube Feeding    Interventions/Recommendations (treatment strategy):  Collaboration with medical providers    Nutrition Diagnosis Status:   continues

## 2023-11-30 NOTE — ASSESSMENT & PLAN NOTE
Patient has hypernatremia which is uncontrolled. The hypernatremia is due to Dehydration. We will aim to correct the sodium by 8-10mEq in 24 hours. We will correct their hypernatremia with  The patient's sodium results have been reviewed and are listed below.  Recent Labs   Lab 11/30/23  0452   *     Add free water 250 ml every 4 hours - discussed with nursing staff. Improving  - now back up to 154? Unclear etiology. He has been receiving his free water flushes.

## 2023-11-30 NOTE — PROGRESS NOTES
[facility] - Intensive Care  Adult Nutrition  Consult Note    SUMMARY     Recommendations    1. Continue TF Isosource 1.5. @ GR 40 mLhr as tolerated.   2. Continue to monitor NPO status.   3. Monitor weights and labs.   4. Collaboration with medical providers    Goals: 1. Pt to meet % EEN/EPN by RD follow up  Nutrition Goal Status: goal met  Communication of RD Recs:  (POC)    Assessment and Plan    Nutrition Problem  Decreased oral intake    Related to (etiology):   Diagnosis related symptoms    Signs and Symptoms (as evidenced by):   NPO status  'Tube Feeding    Interventions/Recommendations (treatment strategy):  Collaboration with medical providers    Nutrition Diagnosis Status:   continues    Reason for Assessment    Reason For Assessment: consult  Diagnosis:  (acute on chronic respiratory failure with hypoxia)  Relevant Medical History:   Past Medical History:   Diagnosis Date    Cancer     skin to Rt forearm and face    COPD (chronic obstructive pulmonary disease)     Hyperlipidemia     Hypertension     Pseudoaneurysm       Interdisciplinary Rounds: did not attend  General Information Comments: RD follow up. Pt is currently NPO on TF Isosource 1.5 @ gr 40 mL/hr. Continue to monitor NPO status. Monitor TF tolerance. BMI 18.60, normal. 8% weight loss x 1 month. Continue to monitor weights; check daily weights. NFPE not appropriate at this time. RD to continue to monitor.    11/21/23 RD consult for TF recs. Pt currently NPO. Pt has no recent weight changes. Consider TF recs of :TF isosource 1.5. Continue to advance TF to GR 40 mLhr as tolerated to meet 100% EEN/EPN. LBM 11/20/23  NKFA. NFPE to be performed at follow ups as warranted.   Nutrition Discharge Planning: pending medical course    Nutrition Risk Screen    Nutrition Risk Screen: tube feeding or parenteral nutrition    Nutrition/Diet History    Spiritual, Cultural Beliefs, Jainism Practices, Values that Affect Care: no  Food Allergies:  "[facility]    Anthropometrics    Temp: 97.9 °F (36.6 °C)  Height: 5' 8" (172.7 cm)  Height (inches): 68 in  Weight Method: Bed Scale  Weight: 55.5 kg (122 lb 5.7 oz)  Weight (lb): 122.36 lb  Ideal Body Weight (IBW), Male: 154 lb  % Ideal Body Weight, Male (lb): 84.42 %  BMI (Calculated): 18.6  BMI Grade: 18.5-24.9 - normal       Lab/Procedures/Meds    Pertinent Labs Reviewed: reviewed  BMP  BMP  Lab Results   Component Value Date     (H) 2023    K 3.5 2023     2023    CO2 33 (H) 2023    BUN 37 (H) 2023    CREATININE 1.0 2023    CALCIUM 10.1 2023    ANIONGAP 12 2023    EGFRNORACEVR >60 2023      Pertinent Medications Reviewed: reviewed  Current Outpatient Medications   Medication Instructions    albuterol-ipratropium (DUO-NEB) 2.5 mg-0.5 mg/3 mL nebulizer solution 3 mLs, Nebulization, Every 4 hours PRN, Rescue    ascorbic acid (vitamin C) (VITAMIN C) 100 mg, Oral, Daily    aspirin 81 mg, Oral, Daily    atorvastatin (LIPITOR) 40 mg, Per G Tube, Nightly    bisacodyL (DULCOLAX) 10 mg, Rectal, Daily PRN    clopidogreL (PLAVIX) 75 mg, Oral, Daily    ferrous sulfate 325 mg, Oral, 3 times daily with meals    FLUoxetine (PROZAC) 20 mg, Oral, Daily    folic acid (FOLVITE) 1 mg, Oral, Daily    furosemide (LASIX) 20 mg, Oral, Daily    glycopyrrolate (CUVPOSA) 1 mg/5 mL (0.2 mg/mL) Soln Take 5 mLs (1 mg total) by mouth 3 (three) times daily as needed (TO REDUCE SECRETIONS).    hydrOXYzine HCL (ATARAX) 25 MG tablet SMARTSI.5 Tablet(s) Gastro Tube Every 8 Hours PRN    melatonin (MELATIN) 3 mg, Per G Tube, Nightly    metoprolol tartrate (LOPRESSOR) 12.5 mg, Oral, 2 times daily    omeprazole (PRILOSEC) 40 MG capsule Take 40 mg (contents of one capsule) twice daily through PEG tube. Mix contents of capsule with 10 mL applesauce.    polyethylene glycol (GLYCOLAX) 17 g, Per G Tube, 2 times daily    sodium chloride 3% 3 % nebulizer solution 4 mLs, Nebulization, 2 times daily "    thiamine (VITAMIN B-1) 50 mg, Oral, Daily    WIXELA INHUB 250-50 mcg/dose diskus inhaler SMARTSI Puff(s) By Mouth Every 12 Hours        Estimated/Assessed Needs    Weight Used For Calorie Calculations: 59 kg (130 lb)  Energy Calorie Requirements (kcal): 1695 kcal  Energy Need Method: Inlet-St Jeor (x 1.3)  Protein Requirements: 59 g  Weight Used For Protein Calculations: 59 kg (130 lb)     Estimated Fluid Requirement Method: RDA Method  RDA Method (mL): 1695         Nutrition Prescription Ordered    Current Diet Order: NPO    Evaluation of Received Nutrient/Fluid Intake    I/O: -56 mL  Energy Calories Required: not meeting needs  Protein Required: not meeting needs  Fluid Required: not meeting needs  Comments: lbm /  % Intake of Estimated Energy Needs: 0 - 25 %  % Meal Intake: NPO    Nutrition Risk    Level of Risk/Frequency of Follow-up: moderate       Monitor and Evaluation    Food and Nutrient Intake: food and beverage intake, enteral nutrition intake  Food and Nutrient Adminstration: diet order, enteral and parenteral nutrition administration  Knowledge/Beliefs/Attitudes: beliefs and attitudes, food and nutrition knowledge/skill  Physical Activity and Function: nutrition-related ADLs and IADLs, factors affecting access to physical activity  Anthropometric Measurements: weight, weight change, body mass index  Biochemical Data, Medical Tests and Procedures: gastrointestinal profile  Nutrition-Focused Physical Findings: overall appearance       Nutrition Follow-Up    RD Follow-up?: Yes    Ambar Dutta, Registration Eligible, Provisional LDN

## 2023-11-30 NOTE — NURSING
[facility], [facility]  Nurses Note -- 4 Eyes      11/29/2023       Skin assessed on: Q Shift      [x] No Pressure Injuries Present    [x]Prevention Measures Documented    [] Yes LDA  for Pressure Injury Previously documented     [] Yes New Pressure Injury Discovered   [] LDA for New Pressure Injury Added      Attending RN:  AGNES REAL RN     Second RN:  Eugenia TRIANA

## 2023-11-30 NOTE — PT/OT/SLP PROGRESS
Physical Therapy Treatment    Patient Name:  Fareed Richard Jr.   MRN:  [id number]    Recommendations:     Discharge Recommendations: Low Intensity Therapy  Discharge Equipment Recommendations: to be determined by next level of care  Barriers to discharge: None    Assessment:     Fareed Richard Jr. is a 73 y.o. male admitted with a medical diagnosis of Acute on chronic respiratory failure with hypoxia.  He presents with the following impairments/functional limitations: weakness, impaired endurance, impaired self care skills, impaired functional mobility, gait instability, decreased upper extremity function, decreased lower extremity function, impaired balance, decreased coordination, decreased safety awareness, decreased ROM, impaired cardiopulmonary response to activity .    Rehab Prognosis: Fair; patient would benefit from acute skilled PT services to address these deficits and reach maximum level of function.    Recent Surgery: * No surgery found *      Plan:     During this hospitalization, patient to be seen 2 x/week to address the identified rehab impairments via gait training, therapeutic activities, therapeutic exercises, neuromuscular re-education and progress toward the following goals:    Plan of Care Expires:  12/07/23    Subjective     Chief Complaint: tired of being in the hospital  Patient/Family Comments/goals: declined to be OOB   Pain/Comfort:  Pain Rating 1: 0/10  Pain Rating Post-Intervention 1: 0/10      Objective:     Communicated with nurse prior to session.  Patient found HOB elevated with bed alarm, oxygen, telemetry, Tracheostomy, PEG Tube, peripheral IV upon PT entry to room.     General Precautions: Standard, fall, respiratory, NPO  Orthopedic Precautions: N/A  Braces: N/A  Respiratory Status:  trach collar, 5L    SpO2: 96%   Functional Mobility:  Bed Mobility:     Scooting: modified [place]      AM-PAC 6 CLICK MOBILITY  Turning over in bed (including adjusting bedclothes, sheets and  blankets)?: 4  Sitting down on and standing up from a chair with arms (e.g., wheelchair, bedside commode, etc.): 4  Moving from lying on back to sitting on the side of the bed?: 3  Moving to and from a bed to a chair (including a wheelchair)?: 3  Need to walk in hospital room?: 3  Climbing 3-5 steps with a railing?: 2  Basic Mobility Total Score: 19       Treatment & Education:  BLE HEP given with instructions and demonstrations (pt  verbalized understanding). Encouraged pt to perform BLE ex's per handout throughout the day.   Educated pt on energy conservation techniques.   Pt performed BLE x 10 reps : AP     Patient left with bed in chair position heels offloading with all lines intact, call button in reach, bed alarm on, and nurse  notified..    GOALS:   Multidisciplinary Problems       Physical Therapy Goals          Problem: Physical Therapy    Goal Priority Disciplines Outcome Goal Variances Interventions   Physical Therapy Goal     PT, PT/OT Ongoing, Progressing     Description: Goals to be met by: 23     Patient will increase functional independence with mobility by performin. Supine to sit with Modified [place]  2. Rolling to Left and Right with Modified [place].  3. Sit to stand transfer with Modified [place]  4. Bed to chair transfer with Modified [place] using No Assistive Device  5. Gait  x 300 feet with Modified [place] using No Assistive Device.                          Time Tracking:     PT Received On: 23  PT Start Time: 1000     PT Stop Time: 1008  PT Total Time (min): 8 min     Billable Minutes: Therapeutic Activity 8    Treatment Type: Treatment  PT/PTA: PTA     Number of PTA visits since last PT visit: 2     2023

## 2023-11-30 NOTE — PLAN OF CARE
11/30/23 1046   Medicare Message   Important Message from Medicare regarding Discharge Appeal Rights Given to patient/caregiver;Explained to patient/caregiver  (CM explained IMM. Pt's spouse, Cat verbally expressed understanding. Pt spouse requested to leave a copy in pt's room.)   Date IMM was signed 11/30/23   Time IMM was signed 1040

## 2023-12-01 ENCOUNTER — TELEPHONE (OUTPATIENT)
Dept: CARDIOLOGY | Facility: CLINIC | Age: 73
End: 2023-12-01
Payer: MEDICARE

## 2023-12-01 VITALS
SYSTOLIC BLOOD PRESSURE: 136 MMHG | RESPIRATION RATE: 18 BRPM | HEIGHT: 68 IN | WEIGHT: 122.38 LBS | OXYGEN SATURATION: 94 % | BODY MASS INDEX: 18.55 KG/M2 | HEART RATE: 86 BPM | DIASTOLIC BLOOD PRESSURE: 96 MMHG | TEMPERATURE: 98 F

## 2023-12-01 LAB
ANION GAP SERPL CALC-SCNC: 13 MMOL/L (ref 8–16)
BASOPHILS # BLD AUTO: 0.02 K/UL (ref 0–0.2)
BASOPHILS NFR BLD: 0.2 % (ref 0–1.9)
BUN SERPL-MCNC: 39 MG/DL (ref 8–23)
CALCIUM SERPL-MCNC: 10 MG/DL (ref 8.7–10.5)
CHLORIDE SERPL-SCNC: 112 MMOL/L (ref 95–110)
CO2 SERPL-SCNC: 28 MMOL/L (ref 23–29)
CREAT SERPL-MCNC: 1.1 MG/DL (ref 0.5–1.4)
DIFFERENTIAL METHOD BLD: ABNORMAL
EOSINOPHIL # BLD AUTO: 0.1 K/UL (ref 0–0.5)
EOSINOPHIL NFR BLD: 0.5 % (ref 0–8)
ERYTHROCYTE [DISTWIDTH] IN BLOOD BY AUTOMATED COUNT: 14.2 % (ref 11.5–14.5)
EST. GFR  (NO RACE VARIABLE): >60 ML/MIN/1.73 M^2
GLUCOSE SERPL-MCNC: 169 MG/DL (ref 70–110)
HCT VFR BLD AUTO: 39.2 % (ref 40–54)
HGB BLD-MCNC: 11.7 G/DL (ref 14–18)
IMM GRANULOCYTES # BLD AUTO: 0.06 K/UL (ref 0–0.04)
IMM GRANULOCYTES NFR BLD AUTO: 0.5 % (ref 0–0.5)
LYMPHOCYTES # BLD AUTO: 0.6 K/UL (ref 1–4.8)
LYMPHOCYTES NFR BLD: 4.9 % (ref 18–48)
MCH RBC QN AUTO: 28.2 PG (ref 27–31)
MCHC RBC AUTO-ENTMCNC: 29.8 G/DL (ref 32–36)
MCV RBC AUTO: 95 FL (ref 82–98)
MONOCYTES # BLD AUTO: 0.8 K/UL (ref 0.3–1)
MONOCYTES NFR BLD: 6.7 % (ref 4–15)
NEUTROPHILS # BLD AUTO: 10.4 K/UL (ref 1.8–7.7)
NEUTROPHILS NFR BLD: 87.2 % (ref 38–73)
NRBC BLD-RTO: 0 /100 WBC
PLATELET # BLD AUTO: 168 K/UL (ref 150–450)
PMV BLD AUTO: 10.6 FL (ref 9.2–12.9)
POTASSIUM SERPL-SCNC: 3.4 MMOL/L (ref 3.5–5.1)
RBC # BLD AUTO: 4.15 M/UL (ref 4.6–6.2)
SODIUM SERPL-SCNC: 153 MMOL/L (ref 136–145)
WBC # BLD AUTO: 11.91 K/UL (ref 3.9–12.7)

## 2023-12-01 PROCEDURE — 80048 BASIC METABOLIC PNL TOTAL CA: CPT | Performed by: HOSPITALIST

## 2023-12-01 PROCEDURE — 94640 AIRWAY INHALATION TREATMENT: CPT

## 2023-12-01 PROCEDURE — 25000242 PHARM REV CODE 250 ALT 637 W/ HCPCS: Performed by: HOSPITALIST

## 2023-12-01 PROCEDURE — 25000003 PHARM REV CODE 250: Performed by: HOSPITALIST

## 2023-12-01 PROCEDURE — 99900026 HC AIRWAY MAINTENANCE (STAT)

## 2023-12-01 PROCEDURE — 85025 COMPLETE CBC W/AUTO DIFF WBC: CPT | Performed by: HOSPITALIST

## 2023-12-01 PROCEDURE — 27000221 HC OXYGEN, UP TO 24 HOURS

## 2023-12-01 PROCEDURE — 25000003 PHARM REV CODE 250: Performed by: PHYSICIAN ASSISTANT

## 2023-12-01 PROCEDURE — 94761 N-INVAS EAR/PLS OXIMETRY MLT: CPT

## 2023-12-01 PROCEDURE — 27200966 HC CLOSED SUCTION SYSTEM

## 2023-12-01 PROCEDURE — 99900035 HC TECH TIME PER 15 MIN (STAT)

## 2023-12-01 RX ADMIN — FOLIC ACID 1 MG: 1 TABLET ORAL at 09:12

## 2023-12-01 RX ADMIN — ATORVASTATIN CALCIUM 40 MG: 40 TABLET, FILM COATED ORAL at 09:12

## 2023-12-01 RX ADMIN — IPRATROPIUM BROMIDE AND ALBUTEROL SULFATE 3 ML: 2.5; .5 SOLUTION RESPIRATORY (INHALATION) at 07:12

## 2023-12-01 RX ADMIN — Medication 50 MG: at 09:12

## 2023-12-01 RX ADMIN — FLUOXETINE HYDROCHLORIDE 20 MG: 20 SOLUTION ORAL at 09:12

## 2023-12-01 RX ADMIN — FAMOTIDINE 20 MG: 10 INJECTION INTRAVENOUS at 09:12

## 2023-12-01 RX ADMIN — ARFORMOTEROL TARTRATE 15 MCG: 15 SOLUTION RESPIRATORY (INHALATION) at 07:12

## 2023-12-01 RX ADMIN — ASPIRIN 81 MG CHEWABLE TABLET 81 MG: 81 TABLET CHEWABLE at 09:12

## 2023-12-01 RX ADMIN — METOPROLOL TARTRATE 12.5 MG: 25 TABLET, FILM COATED ORAL at 09:12

## 2023-12-01 RX ADMIN — BUDESONIDE 0.5 MG: 0.5 INHALANT RESPIRATORY (INHALATION) at 07:12

## 2023-12-01 RX ADMIN — SODIUM CHLORIDE SOLN NEBU 3% 4 ML: 3 NEBU SOLN at 07:12

## 2023-12-01 RX ADMIN — IPRATROPIUM BROMIDE AND ALBUTEROL SULFATE 3 ML: 2.5; .5 SOLUTION RESPIRATORY (INHALATION) at 12:12

## 2023-12-01 NOTE — NURSING
[facility], [facility]  Nurses Note -- 4 Eyes      12/1/2023       Skin assessed on: Q Shift      [x] No Pressure Injuries Present    [x]Prevention Measures Documented    [] Yes LDA  for Pressure Injury Previously documented     [] Yes New Pressure Injury Discovered   [] LDA for New Pressure Injury Added      Attending RN:  Eugenia Donohue, RN     Second RN:  Nila CÁRDENAS RN

## 2023-12-01 NOTE — NURSING
Discharge instructions complete. Educated pt on upcoming appointments, medications and when to seek help. Pt displayed understanding. Spoke with wife who will await pt's arrival at home.  Pt discharged to home.

## 2023-12-01 NOTE — TELEPHONE ENCOUNTER
Marcia Ramirez Staff   Caller: Unspecified (Today,  4:14 PM)   Type: Patient Call Back     Who called: wife Cat     What is the request in detail: asked if the physician statement was signed by provider, she stated this was all that was needed. She stated she will come pick it up.     Can the clinic reply by [facility]? no     Would the patient rather a call back or a response via [facility]? Call back     Best call back number: [phone]

## 2023-12-01 NOTE — PLAN OF CARE
[facility] [place] Tele[facility]      HOME HEALTH ORDERS  FACE TO FACE ENCOUNTER    Patient Name: Fareed Richard Jr.  YOB: 1950    PCP: Kodi Tubbs MD   PCP Address: [address] Formerly named [facility] / [place] [place]  PCP Phone Number: [phone]  PCP Fax: [phone]    Encounter Date: 11/20/23    Admit to Home Health    Diagnoses:  Active Hospital Problems    Diagnosis  POA    *Acute on chronic respiratory failure with hypoxia [J96.21]  Yes    Physical deconditioning [R53.81]  Yes    Pneumonia due to Pseudomonas species [J15.1]  Yes    Normocytic anemia [D64.9]  Yes    Acute on chronic diastolic heart failure [I50.33]  Yes    Tracheostomy present [Z93.0]  Not Applicable     Tracheotomy at time of glossectomy by Dr. Smalls in January 2022.  Routine trach changes in ENT clinic since that time.    Most recent trach change on 10/9/2023 => currently with uncuffed 7.5mm inner diameter tracheostomy tube in place.        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  Final path :  [id number] (+microscopic SALINAS, single contralateral node), superior soft tissue margin of left neck with tumor.    2/28/22-4/20/22 completed adjuvant chemoradiation with weekly cisplatin (240 mg/m2 cumulative dose)        Resolved Hospital Problems    Diagnosis Date Resolved  POA    Pneumonia [J18.9] 11/20/2023 Yes       Follow Up Appointments:  Future Appointments   Date Time Provider Department Center   1/8/2024  9:30 AM LAB, [facility] LAB [facility] [place]   1/8/2024 10:30 AM [facility]       Allergies:  Review of patient's allergies indicates:   Allergen Reactions    Ativan [lorazepam] Anxiety       Medications: Review discharge medications with patient and family and provide education.      I have seen and examined this patient within the last 30 days. My clinical findings that support the need for the home health skilled services and home bound status are the following:no   Weakness/numbness causing balance and gait disturbance due to Infection making it taxing to leave home.     Diet:   NPO with tube feeds    Isosource 1.5 @ 40 ml/hr and 250 cc free water flushes every 4 hours (total 1.5 L free water daily)    Labs:  SN to perform labs:  BMP: Weekly; 1 month(s) and Report Lab results to PCP.    Referrals/ Consults  Physical Therapy to evaluate and treat. Evaluate for home safety and equipment needs; Establish/upgrade home exercise program. Perform / instruct on therapeutic exercises, gait training, transfer training, and Range of Motion.  Occupational Therapy to evaluate and treat. Evaluate home environment for safety and equipment needs. Perform/Instruct on transfers, ADL training, ROM, and therapeutic exercises.   to evaluate for community resources/long-range planning.  Aide to provide assistance with personal care, ADLs, and vital signs.    Activities:   activity as tolerated    Nursing:   Agency to admit patient within 24 hours of hospital discharge unless specified on physician order or at patient request    SN to complete comprehensive assessment including routine vital signs. Instruct on disease process and s/s of complications to report to MD. Review/verify medication list sent home with the patient at time of discharge  and  instruct patient/caregiver as needed. Frequency may be adjusted depending on start of care date.     Skilled nurse to perform up to 3 visits PRN for symptoms related to diagnosis    Notify MD if SBP > 160 or < 90; DBP > 90 or < 50; HR > 120 or < 50; Temp > 101; O2 < 88%; Other:       Ok to schedule additional visits based on staff availability and patient request on consecutive days within the home health episode.    Miscellaneous   PEG Care:  Instruct patient/caregiver to clean site.  Monitor skin integrity.  Home Oxygen:  No change Home 6 liters trach collar    Home Health Aide:  Nursing , Physical Therapy , Occupational Therapy , Medical Social Work , Respiratory Therapy , and Home Health Aide     Wound Care Orders  no    I certify that this patient is confined to his home and needs intermittent skilled nursing care, physical therapy, and occupational therapy.

## 2023-12-01 NOTE — PLAN OF CARE
ADT 30 order placed for Van Transportation.  Requested  time:11:00 am.  If transportation does not arrive at ETA time nurse will be instructed to follow protocol for transportation below:  How can I get in touch directly with dispatch, if needed?                 Non-emergent dispatch: [phone] option 6    +++NURSING:  If Van does not arrive at requested time please call the above Non Emergent Dispatcher.  If issue not resolved please escalate to your charge nurse for further instructions.

## 2023-12-01 NOTE — PLAN OF CARE
[facility]  CARDIOLOGY OFFICE VISIT        PRIMARY CARE PROVIDER  Reza Marquez MD  Last seen: 08/21/2023    SUBJECTIVE  CHIEF COMPLAINT / REASON FOR VISIT: ***      Cardiac Problem List:   S/p Aortic Valve Replacement (07/10/2023)  Hypertension    Noncardiac Considerations:  COPD, mild  GERD    Pertinent ED Visits/Hospitalizations since last Cardiology office visit:   [facility] [place] ED (07/19/2023) - Wound check.  Keflex      HISTORY OF PRESENT ILLNESS  Pleasant 51 year old male seen in valve surgery follow up.      Patient was seen s/p aortic valve replacement.  Patient underwent aortic valve replacement (#23 On-X mechanical valve) and BELINDA ligation with AtriClip on 07/10/2023 at [facility] with Dr. Jordin De Jesus.    Patient discharged on POD #4 with [facility].  Presented to [facility] ED on 07/19/2023 for a sternal wound check.  Noted to have serosanguinous drainage from the distal portion of his sternal incision and no signs of active infection.  [facility] contact our office 07/25/2023 with concern for  Abscess developing at the proximal portion of the sternal incision.  Patient started on Keflex.    Last saw Reza Marquez MD 08/21/2023 for hypertension.  Continue metoprolol 25 mg q.12h.  Follow up in 6 months and as needed.    Patient's INR monitored by [facility].  Last INR was 2.5 on 08/28/2023.  Next INR scheduled for 09/05/2023.    Today patient reports ***       ALLERGIES  ALLERGIES:   Allergen Reactions   • Lisinopril Cough       MEDICATIONS  Current Outpatient Medications   Medication Sig Dispense Refill   • atorvastatin (LIPITOR) 20 MG tablet Take 1 tablet by mouth daily. 90 tablet 1   • escitalopram (LEXAPRO) 5 MG tablet Take 1 tablet by mouth daily. 90 tablet 1   • fluticasone (FLONASE) 50 MCG/ACT nasal spray Spray 2 sprays in each nostril daily. 1 each 3   • trazodone (DESYREL) 150 MG tablet Take 1 tablet by mouth nightly. 90 tablet 1   •  Transportation scheduled for 11:00 am to transport pt home[facility] CM notified nurse, Eugenia that pt is ready for discharge from CM standpoint. All CM needs met.     12/01/23 0909   Final Note   Assessment Type Final Discharge Note   Anticipated Discharge Disposition Home-Health   What phone number can be called within the next 1-3 days to see how you are doing after discharge? [phone]   Hospital Resources/Appts/Education Provided Appointments scheduled and added to AVS   Post-Acute Status   Post-Acute Authorization Home Health   Home Health Status Pending post-acute provider review/more information requested   [facility]age [facility]   Patient choice form signed by patient/caregiver List from System Post-Acute Care   Discharge Delays (!) PFC Arranged Transportation        Ventolin  (90 Base) MCG/ACT inhaler Inhale 2 puffs into lungs every 4 hours as needed for shortness of breath or wheezing 18 g 0   • acetaminophen (TYLENOL) 325 MG tablet Take 2 tablets by mouth every 4 hours as needed for Pain. Do not exceed more than 4,000 mg of acetaminophen (Tylenol) in a 24-hour period.     • aspirin 81 MG chewable tablet Chew 1 tablet by mouth daily. Do not start before July 15, 2023.     • metoPROLOL tartrate (LOPRESSOR) 25 MG tablet Take 1 tablet by mouth every 12 hours. 180 tablet 3   • warfarin (COUMADIN) 5 MG tablet Take 5 mg (1 tablet) on 7/14/23, 2.5 mg (1/2 tablet) on 7/15/23, and 2.5 mg (1/2 tablet) on 7/16/23 with next INR on 7/17/23 by [facility] nursing. Then take daily as directed by [facility] clinic. 90 tablet 4   • amoxicillin (AMOXIL) 500 MG tablet Take 4 tablets by mouth 1 time as needed (Take 60 minutes prior to any dental procedure for endocarditis prophylaxis.). 8 tablet 2   • nicotine (NICODERM) 21 MG/24HR patch APPLY 1 PATCH ONTO THE SKIN ONCE DAILY **DO  NOT  START  BEFORE  JULY  15,  2023** 37 each 0   • Nicotine Step 2 14 MG/24HR patch APPLY 1 PATCH TOPICALLY EVERY 24 HOURS FOR 14 DAYS **DO  NOT  START  BEFORE  AUGUST 22, 2023** 14 each 0   • Nicotine Step 3 7 MG/24HR patch APPLY 1 PATCH TOPICALLY EVERY 24 HOURS FOR 14 DAYS **DO  NOT  START  BEFORE  SEPTEMBER 6, 2023** 14 each 0   • hyoscyamine (HyoMax SR) 0.375 MG 12 hr tablet TAKE 1 TABLET BY MOUTH EVERY 12 HOURS AS NEEDED FOR  CRAMPING 60 tablet 1     No current facility-administered medications for this visit.       MEDICAL HISTORY  Past Medical History:   Diagnosis Date   • COPD (chronic obstructive pulmonary disease) (CMD)    • Essential (primary) hypertension    • Heart murmur    • High cholesterol    • Sinus problem         SURGICAL HISTORY  Past Surgical History:   Procedure Laterality Date   • Arthroscopy shoulder debride extensive Left 01/04/2022    LT shoulder ASD, Debridement, Biceps  tenodesis; 1/4/22   • Arthroscopy shoulder w lysis resect adhesions w or wo manip Left 05/24/2022    LT shoulder CR/LAMAR; 5/24/22   • Cardiac catheterization/possible ptca/possible stent Right 06/28/2023    diagnostic   • Tonsillectomy and adenoidectomy  childhood        SOCIAL HISTORY  Social History     Tobacco Use   • Smoking status: Every Day     Current packs/day: 1.50     Average packs/day: 1.5 packs/day for 28.0 years (42.0 ttl pk-yrs)     Types: Cigarettes   • Smokeless tobacco: Never   • Tobacco comments:     patient is considering quiting, he declined informaion   Vaping Use   • Vaping Use: never used   Substance Use Topics   • Alcohol use: Yes     Alcohol/week: 35.0 standard drinks of alcohol     Types: 35 Cans of beer per week     Comment: 3-6 beers a day   • Drug use: No        FAMILY HISTORY  Family History   Problem Relation Age of Onset   • Cancer Father         lung   • Thyroid Father    • Cancer Mother         breast   • Diabetes Maternal Grandmother    • Heart Maternal Grandmother    • Diabetes Maternal Grandfather         Review of Systems:  GENERAL: Negative for: {ROS - GENERAL:890203::\"fever\",\"chills\"}  NEURO: Negative for: {ROS - NEURO:332267::\"lightheadedness\",\"dizziness \"}  PULMONARY: Negative for: {ROS PULM:987076::\"shortness of breath\",\"wheezing\",\"cough\",\"sputum production\",\"hemoptysis \"}  CV: Negative for: {ROS - CV:637100::\"syncope\",\"angina\",\"palpitations\",\"claudication\"}  GI: Negative for: {ROS - GI:932127::\"hematemesis \",\"melena\"}  MS: Negative for: {ROS - MUSCULOSKELETAL:434631::\"muscle weakness\",\"joint stiffness\",\"swelling \",\"myalgias\"}     VITALS  There were no vitals taken for this visit.     Physical Exam:  General: {GENERAL:206620::\"comfortable\",\"in no acute distress\"}  HEENT:   {PE HEENT:206622::\"no pallor, jaundice\"}  Neck:  {PE NECK:206623::\"supple\",\"no carotid bruits\",\"no JVD\"}  Lungs:  {PE LUNGS:139046::\"clear to auscultation\",\"good air entry\",\"no rales or wheezes\",\"no  rhonchi\"}  Cardiovascular:   {PE CARDIO:206626::\"regular rate and rhythm\",\"no S1 and S2 normal\",\"no S3 or S4\"}  Abdomen:   {PE ABD:206627::\"soft, non-tender, nondistended\"}  Extremities:   {EXTREMITIES NEGATIVES LIST:206632::\"no edema\",\"no clubbing\"}  Neuro:  {PE NEURO:973351::\"awake, alert, oriented X3\"}  Skin:   {SKIN EXAM:206634::\"color, texture, turgor are normal\",\"no bruises or rashes\"}       Lab Results   Component Value Date    SODIUM 137 07/14/2023    POTASSIUM 3.9 07/14/2023    BUN 8 07/14/2023    CREATININE 0.64 (L) 07/14/2023    WBC 9.3 07/14/2023    HCT 33.4 (L) 07/14/2023    HGB 11.6 (L) 07/14/2023     07/14/2023    INR 2.5 08/28/2023     (HH) 07/14/2023    GLUCOSE 111 (H) 07/14/2023    TSH 1.152 06/28/2023    CHOLESTEROL 151 08/21/2023    HDL 46 08/21/2023    CALCLDL 56 08/21/2023    TRIGLYCERIDE 246 (H) 08/21/2023    MG 1.8 07/14/2023    GFRA >90 02/20/2018    GFRNA >90 02/20/2018    AST 15 08/21/2023    GPT 22 08/21/2023    ALKPT 105 08/21/2023    BILIRUBIN 0.4 08/21/2023       No results found for: \"NTPROB\"       DIAGNOSTICS:  07/11/2023 12-Lead EKG  Normal sinus rhythm   Incomplete right bundle branch block   Otherwise normal ECG   When compared with ECG of 28-JUN-2023 10:25,   QRS duration has decreased   Confirmed by WENDI GAGE, ESTELA ([id number]) on 7/11/2023 11:10:51 AM    07/10/2023 Aortic Valve Replacement - Dr. De Jesus  PROCEDURE PERFORMED:  1.)  Aortic valve replacement (23 mm On-X mechanical valve)  2.)  BELINDA ligation with atriclip    06/28/2023 Carotid US  IMPRESSION:  1. RIGHT CAROTID: There is less than 50% stenosis using SRU criteria.  Minimal plaque formation.  2. LEFT CAROTID: There is less than 50% stenosis using SRU criteria.  Minimal plaque formation.  3. Antegrade flow in both vertebral arteries.    06/28/2023 Coronary Angiogram  Coronary arteries are free of any significant flow-limiting disease.  Severe symptomatic aortic valve stenosis with normal left ventricle  ejection fraction    06/08/2023 CTA Chest  IMPRESSION:  Normal caliber thoracic aorta with no concerning findings.  Valvular leaflet calcifications in a known bicuspid aortic valve.    11/10/2022 Echocardiogram  Severe aortic valve stenosis; mean gradient 37 mmHg, LUCI 0.7 cm2 , Doppler velocity index 0.21.  Left ventricular ejection fraction; 67 %.  No left ventricular regional wall motion abnormalities.  Indeterminate left ventricular diastolic dysfunction.  Normal right ventricular systolic function.  No pericardial effusion.  RVSP could not be calculated due to incomplete tricuspid regurgitation velocity profile.  Compared to prior study December 29, 2021, there is mild decrease in aortic valve area.      Assessment:  S/p Aortic Valve Replacement (07/10/2023)  · 23 mm On-X mechanical valve  Normal LV functions and ejection fraction  Ace inhibitor induced cough  Raynaud's phenomenon    Plan:  ***  Follow up in cardiology clinic in *** or sooner if symptomatic       ***     {Card Provider Signature:[id number]}  08/29/23    We would like to thank Reza Noriega MD for involving us in the care of Hola Cartwright.

## 2023-12-01 NOTE — PLAN OF CARE
Problem: Adult Inpatient Plan of Care  Goal: Plan of Care Review  Outcome: Ongoing, Progressing  Goal: Patient-Specific Goal (Individualized)  Outcome: Ongoing, Progressing  Goal: Absence of Hospital-Acquired Illness or Injury  Outcome: Ongoing, Progressing  Goal: Optimal Comfort and Wellbeing  Outcome: Ongoing, Progressing  Goal: Readiness for Transition of Care  Outcome: Ongoing, Progressing     Problem: Diabetes Comorbidity  Goal: Blood Glucose Level Within Targeted Range  Outcome: Ongoing, Progressing     Problem: Fluid Imbalance (Pneumonia)  Goal: Fluid Balance  Outcome: Ongoing, Progressing     Problem: Respiratory Compromise (Pneumonia)  Goal: Effective Oxygenation and Ventilation  Outcome: Ongoing, Progressing

## 2023-12-01 NOTE — TELEPHONE ENCOUNTER
Returned call back request to let her know that his paperwork was ready for  from the [facility]. She stated that she was on the way to  the pt from the hospital and that she would stop by the office get the paperwork.

## 2023-12-03 NOTE — DISCHARGE SUMMARY
[facility]  Discharge Summary      Patient Name: Fareed Richard Jr.  MRN: [id number]  [facility] [place]: [id number]  Patient Class: IP- Inpatient  Admission Date: 11/20/2023  Hospital Length of Stay: 11 days  Discharge Date and Time: 12/1/2023  1:30 PM  Attending Physician: No att. 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     Patient presents with worsening shortness of breath that started 3 days prior to presentation.  He additionally reports worsening secretion from his tracheostomy.  Additional symptoms include wheezing.    In the ED, the patient was tachycardic (110), tachypneic and hypoxic (SpO2:  85%), and was placed on BiPAP.  Labs were remarkable for an elevated BNP (1708), elevated troponin (0.030).  VBG showed a pH of 7.36, pCO2 of 60.3, HC03 of 35.8.  Chest x-ray showed no significant change from prior studies.  Patient was given Lasix 40 mg IV, Solu-Medrol 80 mg IV, vancomycin and Zosyn.  He was admitted for further management.    * No surgery found *      Hospital Course:   Mr Fareed Richard Jr. Was admitted with acute hypoxic respiratory failure secondary to Pseudomonas PNA and COPD exacerbation. On oxygen 6 L trach collar and tube feeding at home. Started BiPAP, zosyn, and admitted to ICU. Stepped down to floor 11/24. Lung coarse and still requiring 8 L oxygen. Continue zoysn  duoneb/NS 3% neb, PO prednisone. RT following closely and wean to keep 88-90%.  Tolerating TF at goal 40 cc/hr isosource. Adding free water for hypernatremia which is improving. Discussed with patient about increasing water for hte next few days at home. PT/OT evaluation completed. Low intensity  on discharge. Completed 7 day course antibx for Pseudomonas pneumonia. Slowly weaning O2 to home. Stable for discharge home.      Goals of Care Treatment Preferences:  Code Status: Full Code          What is most important right now is to focus on spending time at home, remaining as independent as possible, symptom/pain control, curative/life-prolongation (regardless of treatment burdens).  Accordingly, we have decided that the best plan to meet the patient's goals includes continuing with treatment.      Consults:   Consults (From admission, onward)          Status Ordering Provider     Inpatient consult to Pulmonology  Once        Provider:  Alessandra Meza MD    Completed LALI ANAYA     Inpatient consult to Registered Dietitian/Nutritionist  Once        Provider:  (Not yet assigned)    Completed LALI ANAYA            No new Assessment & Plan notes have been filed under this hospital service since the last note was generated.  Service: Hospital Medicine    Final Active Diagnoses:    Diagnosis Date Noted POA    PRINCIPAL PROBLEM:  Acute on chronic respiratory failure with hypoxia [J96.21] 10/20/2023 Yes    Physical deconditioning [R53.81] 11/26/2023 Yes    Pneumonia due to Pseudomonas species [J15.1] 11/22/2023 Yes    Normocytic anemia [D64.9] 11/21/2023 Yes    Acute on chronic diastolic heart failure [I50.33] 10/20/2023 Yes    Tracheostomy present [Z93.0] 09/23/2022 Not Applicable    PEG (percutaneous endoscopic gastrostomy) status [Z93.1] 09/23/2022 Not Applicable    ACP (advance care planning) [Z71.89] 04/28/2022 Not Applicable    Hypernatremia [E87.0] 04/25/2022 Yes    Severe protein-calorie malnutrition [E43] 02/16/2022 Yes    COPD exacerbation [J44.1] 02/16/2022 Yes    Primary hypertension [I10] 12/29/2021 Yes    Other hyperlipidemia [E78.49] 12/29/2021 Yes    Coronary artery disease involving native coronary artery of native heart without angina pectoris [I25.10] 12/29/2021 Yes    Squamous cell cancer of  tongue [C02.9] 12/16/2021 Yes      Problems Resolved During this Admission:    Diagnosis Date Noted Date Resolved POA    Pneumonia [J18.9] 09/23/2022 11/20/2023 Yes       Discharged Condition: stable    Disposition: Home-Health Care [facility]    Follow Up:   Follow-up Information       Kodi Tubbs MD. Go on 12/5/2023.    Specialty: Internal Medicine  Why: To schedule appointment at 10 am.  Contact information:  [address]  [address]  [place] [address] [phone]               [facility].    Specialties: DME Provider, Home Health Services  Why: Office will call patient with a date and time.  Contact information:  [address]  [address]  [place] [id number] [phone]                           Patient Instructions:   No discharge procedures on file.    Significant Diagnostic Studies: Labs: All labs within the past 24 hours have been reviewed    Pending Diagnostic Studies:       None           Medications:  Reconciled Home Medications:      Medication List        CHANGE how you take these medications      atorvastatin 40 MG tablet  Commonly known as: LIPITOR  1 tablet (40 mg total) by Per G Tube route every evening.  What changed: when to take this     clopidogreL 75 mg tablet  Commonly known as: PLAVIX  Take 1 tablet (75 mg total) by mouth once daily.  What changed: when to take this     furosemide 40 MG tablet  Commonly known as: LASIX  Take 0.5 tablets (20 mg total) by mouth once daily.  What changed: when to take this     omeprazole 40 MG capsule  Commonly known as: PRILOSEC  Take 40 mg (contents of one capsule) twice daily through PEG tube. Mix contents of capsule with 10 mL applesauce.  What changed:   how much to take  when to take this            CONTINUE taking these medications      albuterol-ipratropium 2.5 mg-0.5 mg/3 mL nebulizer solution  Commonly known as: DUO-NEB  Take 3 mLs by nebulization every 4 (four) hours as needed for Wheezing. Rescue     ascorbic acid (vitamin  C) 100 MG tablet  Commonly known as: VITAMIN C  Take 100 mg by mouth once daily.     aspirin 81 MG Chew  Take 1 tablet (81 mg total) by mouth once daily.     bisacodyL 10 mg Supp  Commonly known as: DULCOLAX  Place 1 suppository (10 mg total) rectally daily as needed.     CUVPOSA 1 mg/5 mL (0.2 mg/mL) Soln  Generic drug: glycopyrrolate  Take 5 mLs (1 mg total) by mouth 3 (three) times daily as needed (TO REDUCE SECRETIONS).     ferrous sulfate 325 (65 FE) MG EC tablet  Take 325 mg by mouth 3 (three) times daily with meals.     FLUoxetine 20 mg/5 mL (4 mg/mL) solution  Commonly known as: PROZAC  Take 20 mg by mouth once daily.     folic acid 1 MG tablet  Commonly known as: FOLVITE  Take 1 mg by mouth once daily.     hydrOXYzine HCL 25 MG tablet  Commonly known as: ATARAX  SMARTSI.5 Tablet(s) Gastro Tube Every 8 Hours PRN     melatonin 3 mg tablet  Commonly known as: MELATIN  1 tablet (3 mg total) by Per G Tube route nightly.     metoprolol tartrate 25 MG tablet  Commonly known as: LOPRESSOR  Take 0.5 tablets (12.5 mg total) by mouth 2 (two) times daily.     polyethylene glycol 17 gram Pwpk  Commonly known as: GLYCOLAX  17 g by Per G Tube route 2 (two) times daily.     sodium chloride 3% 3 % nebulizer solution  Take 4 mLs by nebulization 2 (two) times a day.     thiamine 50 MG tablet  Commonly known as: VITAMIN B-1  Take 50 mg by mouth once daily.     WIXELA INHUB 250-50 mcg/dose diskus inhaler  Generic drug: fluticasone-salmeterol 250-50 mcg/dose  SMARTSI Puff(s) By Mouth Every 12 Hours              Indwelling Lines/Drains at time of discharge:   Lines/Drains/Airways       Drain  Duration                  Gastrostomy/Enterostomy 22 Percutaneous endoscopic gastrostomy (PEG)  days              Airway  Duration             Adult Surgical Airway 23 1014 Shiley Cuffed 6.0 262 days                    Time spent on the discharge of patient: >35 minutes         Luiz Patel MD  Department of  [facility] - Telemetry

## 2023-12-24 ENCOUNTER — HOSPITAL ENCOUNTER (EMERGENCY)
Facility: HOSPITAL | Age: 73
Discharge: HOME | End: 2023-12-24
Attending: STUDENT IN AN ORGANIZED HEALTH CARE EDUCATION/TRAINING PROGRAM
Payer: MEDICARE

## 2023-12-24 VITALS
BODY MASS INDEX: 18.49 KG/M2 | SYSTOLIC BLOOD PRESSURE: 106 MMHG | HEIGHT: 68 IN | RESPIRATION RATE: 20 BRPM | TEMPERATURE: 98 F | DIASTOLIC BLOOD PRESSURE: 65 MMHG | OXYGEN SATURATION: 98 % | WEIGHT: 122 LBS | HEART RATE: 68 BPM

## 2023-12-24 DIAGNOSIS — K94.23 PEG TUBE MALFUNCTION: Primary | ICD-10-CM

## 2023-12-24 PROCEDURE — 99283 EMERGENCY DEPT VISIT LOW MDM: CPT

## 2023-12-24 NOTE — ED PROVIDER NOTES
Encounter Date: 12/24/2023    SCRIBE #1 NOTE: IMandeep, am scribing for, and in the presence of,  June Davis MD.       History     Chief Complaint   Patient presents with    peg tube problem      Pt arrived to the ED due to PEG tube become dislodged today. Pt has a trach      72 y/o male with PMHx of COPD, HTN, HLD, pseudoaneurysm, CAD s/p stent on plavix and 81 mg ASA, anemia, CHF (TTE on 03/20/23 showed EF of 60%), secondary malignant neoplasm of cervical lymph node, squamous cell cancer of tongue, PVD, s/p tracheotomy, glossectomy, carotid stenosis s/p stent placement, PEG tube, who presents to the ED for evaluation of PEG tube displacement today.     Patient endorses he has had a PEG tube in place for approximately 3 years, noting it was most recently changed 2 months ago. He states the tube was functioning normally this morning, noting it suddenly became dislodge over an hour ago, with no trauma. He lives at home with his wife. PEG tube is a 20 Georgian w/ 6 cc balloon. He endorses he did not attempt to place it back in after it was dislodged. No medications attempted for treatment PTA. No alleviating or exacerbating factors noted. Denies fever, chills, abdominal pain, difficulty breathing, chest pain, or other associated symptoms.      The history is provided by the patient. No  was used.       Review of patient's allergies indicates:   Allergen Reactions    Ativan [lorazepam] Anxiety     Past Medical History:   Diagnosis Date    Cancer     skin to Rt forearm and face    COPD (chronic obstructive pulmonary disease)     Hyperlipidemia     Hypertension     Pseudoaneurysm      Past Surgical History:   Procedure Laterality Date    ABDOMINAL SURGERY      stents placed in liver and large intestines, per patient    ANGIOGRAPHY OF AORTIC ARCH  3/27/2023    Procedure: Aortogram, Aortic Arch;  Surgeon: Tim Bhatt MD;  Location: [facility] CATH LAB;  Service: Cardiology;;    AORTOGRAPHY  WITH SERIALOGRAPHY N/A 3/27/2023    Procedure: AORTOGRAM, WITH SERIALOGRAPHY;  Surgeon: Tim Bhatt MD;  Location: [facility] CATH LAB;  Service: Cardiology;  Laterality: N/A;    CAROTID STENT      COLONOSCOPY N/A 09/27/2022    Procedure: COLONOSCOPY;  Surgeon: Donnie Peterson MD;  Location: [facility] ENDO (2ND FLR);  Service: Endoscopy;  Laterality: N/A;    COLONOSCOPY N/A 09/30/2022    Procedure: COLONOSCOPY;  Surgeon: Joy Cabrera MD;  Location: [facility] ENDO (2ND FLR);  Service: Endoscopy;  Laterality: N/A;    CORONARY STENT PLACEMENT  01/2000    DISSECTION OF NECK Bilateral 01/04/2022    Procedure: DISSECTION, NECK;  Surgeon: Naeem Smalls MD;  [facility];  Service: ENT;  Laterality: Bilateral;    ESOPHAGOGASTRODUODENOSCOPY N/A 09/27/2022    Procedure: EGD (ESOPHAGOGASTRODUODENOSCOPY);  Surgeon: Donnie Peterson MD;  Location: [facility] ENDO (2ND FLR);  Service: Endoscopy;  Laterality: N/A;    ESOPHAGOGASTRODUODENOSCOPY N/A 09/30/2022    Procedure: EGD (ESOPHAGOGASTRODUODENOSCOPY);  Surgeon: Joy Cabrera MD;  Location: [facility] ENDO (2ND FLR);  Service: Endoscopy;  Laterality: N/A;    ESOPHAGOGASTRODUODENOSCOPY W/ PEG N/A 01/04/2022    Procedure: EGD, WITH PEG TUBE INSERTION;  Surgeon: Anthony Calabrese MD;  [facility];  Service: General;  Laterality: N/A;    EYE SURGERY      Cataract bilateral    femoral stents      bilateral    FLAP PROCEDURE N/A 01/03/2022    Procedure: CREATION, FREE FLAP;  Surgeon: Naeem Smalls MD;  [facility];  Service: ENT;  Laterality: N/A;    FLAP PROCEDURE Right 01/04/2022    Procedure: CREATION, FREE FLAP;  Surgeon: Stacey Conde MD;  [facility];  Service: ENT;  Laterality: Right;  Ischemic start 1203  Ischemic stop 1353    GLOSSECTOMY N/A 01/04/2022    Procedure: GLOSSECTOMY;  Surgeon: Naeem Smalls MD;  Location: [facility] OR [address] [place] [address];  Service: ENT;  Laterality: N/A;    LEFT HEART CATHETERIZATION Left 3/23/2023     Procedure: Left heart cath;  Surgeon: Tacos Benitez MD;  Location: [facility] CATH LAB;  Service: Cardiology;  Laterality: Left;    PERCUTANEOUS TRANSLUMINAL BALLOON ANGIOPLASTY OF CORONARY ARTERY Left 3/27/2023    Procedure: Angioplasty-coronary;  Surgeon: Tim Bhatt MD;  Location: [facility] CATH LAB;  Service: Cardiology;  Laterality: Left;  not before 9am, R rad access, 6 Fr EBU 3.5 guide    RADICAL NECK DISSECTION Left 2022    Procedure: DISSECTION, NECK, RADICAL;  Surgeon: Naeem Smalls MD;  [facility];  Service: ENT;  Laterality: Left;    SKIN BIOPSY      SKIN CANCER EXCISION      STENT, CORONARY, MULTI VESSEL  3/27/2023    Procedure: Stent, Coronary, Multi Vessel;  Surgeon: Tim Bhatt MD;  Location: [facility] CATH LAB;  Service: Cardiology;;    TRACHEOTOMY N/A 2022    Procedure: TRACHEOTOMY;  Surgeon: Naeem Smalls MD;  Location: [facility] OR [address] [place] [address];  Service: ENT;  Laterality: N/A;    VASCULAR SURGERY       History reviewed. No pertinent family history.  Social History     Tobacco Use    Smoking status: Former     Current packs/day: 0.00     Average packs/day: 2.0 packs/day for 55.0 years (110.0 ttl pk-yrs)     Types: Cigarettes     Start date: 1963     Quit date: 2018     Years since quittin.6    Smokeless tobacco: Never    Tobacco comments:     3/3 ppd x 40 yrs. Currently 3-4 cigarettes daily .He is trying  to quit. Is using a Vapor cigarettes  2-3 x's a day.   Substance Use Topics    Alcohol use: Not Currently    Drug use: No     Review of Systems   Constitutional:  Negative for chills and fever.   HENT:  Negative for sore throat.    Respiratory:  Negative for cough and shortness of breath.    Cardiovascular:  Negative for chest pain and leg swelling.   Gastrointestinal:  Negative for abdominal pain, diarrhea, nausea and vomiting.        (+) PEG tube issue   Genitourinary:  Negative for dysuria and hematuria.   Musculoskeletal:  Negative for back  pain and neck pain.   Skin:  Negative for rash.   Neurological:  Negative for syncope.   Hematological:  Bruises/bleeds easily.       Physical Exam     Initial Vitals [12/24/23 1305]   BP Pulse Resp Temp SpO2   106/65 68 20 97.8 °F (36.6 °C) 98 %      MAP       --         Physical Exam    Nursing note and vitals reviewed.  Constitutional: He appears well-developed and well-nourished.  Non-toxic appearance. No distress.   HENT:   Head: Normocephalic and atraumatic.   Eyes: Conjunctivae and EOM are normal. Pupils are equal, round, and reactive to light.   Neck: A tracheostomy is present - with a tracheostomy tube.   Cardiovascular:  Normal rate, regular rhythm, normal heart sounds and intact distal pulses.           No murmur heard.  Pulmonary/Chest: Effort normal and breath sounds normal. No respiratory distress. He has no wheezes. He has no rhonchi.   Abdominal: Abdomen is soft. He exhibits no distension.   PEG insertion site with dried blood present but no tenderness or fluctuance noted.   No abdominal tenderness to palpation. There is no guarding.   Musculoskeletal:         General: No tenderness or edema.     Neurological: He is alert and oriented to person, place, and time.   Skin: Skin is warm and dry.   Psychiatric: He has a normal mood and affect.         ED Course   Procedures  Labs Reviewed - No data to display       Imaging Results    None          Medications - No data to display  Medical Decision Making  74 y/o male with PMHx of COPD, HTN, HLD, pseudoaneurysm, CAD s/p stent on plavix and 81 mg ASA, anemia, CHF (TTE on 03/20/23 showed EF of 60%), secondary malignant neoplasm of cervical lymph node, squamous cell cancer of tongue, PVD, s/p tracheotomy, glossectomy, carotid stenosis s/p stent placement, PEG tube, who presents to the ED for evaluation of PEG tube displacement today.     Differential includes:  PEG tube dislodgement versus complication versus infection  Physical exam with no erythema or  fluctuance to PEG tube site, no abdominal tenderness to exam, dislodged PEG tube appears complete, with no missing pieces.  Eighteen Syriac PEG tube [employer] placed with confirmation via good gastric return, secured at 4 cm w/ 6 cc balloon inflated with saline, no resistance on placement.    Follow up as scheduled with PMD.  Return precautions for nausea/vomiting/fever/inability to use PEG tube.    No further CT imaging needed at this time given benign abdominal exam, mature PEG tube with low concern for false tract given chronicity of PEG tube placement.      Amount and/or Complexity of Data Reviewed  External Data Reviewed: notes.     Details: Patient was recently admitted to ICU on 11/20/2023 with acute hypoxic respiratory failure secondary to Pseudomonas PNA and COPD exacerbation after presenting with dyspnea for 3 days, wheezing, and worsening secretions from tracheostomy. He is on oxygen 6 L trach collar and tube feeding at home. He was placed on BiPAP and zosyn, as well as being given duoneb/NS 3% neb, and PO prednisone. Was tlerating TF at goal 40 cc/hr isosource. It was discussed with patient about increasing water for the next few days at home. Low intensity on discharge on 12/01/2023. Completed 7 day course antibx for Pseudomonas pneumonia. Slowly weaning O2 to home. Stable for discharge home.             Scribe Attestation:   Scribe #1: I performed the above scribed service and the documentation accurately describes the services I performed. I attest to the accuracy of the note.        ED Course as of 12/24/23 1716   Sun Dec 24, 2023   1342 No 20 Cuban PEG tubes available in ED, replaced with 18 Cuban PEG tube [employer] with good gastric fluid return  [LF]      ED Course User Index  [LF] June Daivs MD I, June Davis, personally performed the services described in this documentation. All medical record entries made by the scribe were at my direction and in my  presence. I have reviewed the chart and agree that the record reflects my personal performance and is accurate and complete.      Clinical Impression:  Final diagnoses:  [K94.23] PEG tube malfunction (Primary)          ED Disposition Condition    Discharge Stable          ED Prescriptions    None       Follow-up Information    None          June Davis MD  12/24/23 0197

## 2023-12-24 NOTE — DISCHARGE INSTRUCTIONS

## 2024-01-11 ENCOUNTER — HOSPITAL ENCOUNTER (INPATIENT)
Facility: HOSPITAL | Age: 74
LOS: 13 days | Discharge: HOSPICE | DRG: 208 | End: 2024-01-24
Attending: EMERGENCY MEDICINE | Admitting: HOSPITALIST
Payer: MEDICARE

## 2024-01-11 DIAGNOSIS — J44.1 COPD EXACERBATION: ICD-10-CM

## 2024-01-11 DIAGNOSIS — R07.9 CHEST PAIN: ICD-10-CM

## 2024-01-11 DIAGNOSIS — I21.4 NSTEMI (NON-ST ELEVATED MYOCARDIAL INFARCTION): ICD-10-CM

## 2024-01-11 DIAGNOSIS — A49.8 PSEUDOMONAS AERUGINOSA INFECTION: Primary | ICD-10-CM

## 2024-01-11 DIAGNOSIS — I48.91 A-FIB: ICD-10-CM

## 2024-01-11 DIAGNOSIS — J95.851 VENTILATOR ASSOCIATED PNEUMONIA: ICD-10-CM

## 2024-01-11 PROBLEM — E87.0 HYPERNATREMIA: Status: RESOLVED | Noted: 2022-04-25 | Resolved: 2024-01-11

## 2024-01-11 PROBLEM — J18.9 PNEUMONIA: Status: ACTIVE | Noted: 2023-11-22

## 2024-01-11 LAB
ALBUMIN SERPL BCP-MCNC: 3.3 G/DL (ref 3.5–5.2)
ALLENS TEST: ABNORMAL
ALLENS TEST: ABNORMAL
ALP SERPL-CCNC: 117 U/L (ref 55–135)
ALT SERPL W/O P-5'-P-CCNC: 18 U/L (ref 10–44)
ANION GAP SERPL CALC-SCNC: 15 MMOL/L (ref 8–16)
APTT PPP: 26.3 SEC (ref 21–32)
APTT PPP: 36 SEC (ref 21–32)
ASCENDING AORTA: 3.59 CM
AST SERPL-CCNC: 24 U/L (ref 10–40)
AV INDEX (PROSTH): 0.87
AV MEAN GRADIENT: 2 MMHG
AV PEAK GRADIENT: 3 MMHG
AV VALVE AREA BY VELOCITY RATIO: 4.5 CM²
AV VALVE AREA: 4.46 CM²
AV VELOCITY RATIO: 0.88
BASOPHILS # BLD AUTO: 0.04 K/UL (ref 0–0.2)
BASOPHILS NFR BLD: 0.2 % (ref 0–1.9)
BILIRUB SERPL-MCNC: 0.7 MG/DL (ref 0.1–1)
BNP SERPL-MCNC: 2467 PG/ML (ref 0–99)
BSA FOR ECHO PROCEDURE: 1.71 M2
BUN SERPL-MCNC: 26 MG/DL (ref 8–23)
CALCIUM SERPL-MCNC: 9.5 MG/DL (ref 8.7–10.5)
CHLORIDE SERPL-SCNC: 94 MMOL/L (ref 95–110)
CO2 SERPL-SCNC: 30 MMOL/L (ref 23–29)
CREAT SERPL-MCNC: 0.9 MG/DL (ref 0.5–1.4)
CTP QC/QA: YES
CTP QC/QA: YES
CV ECHO LV RWT: 0.54 CM
D DIMER PPP IA.FEU-MCNC: 1.78 MG/L FEU
DELSYS: ABNORMAL
DELSYS: ABNORMAL
DIFFERENTIAL METHOD BLD: ABNORMAL
DOP CALC AO PEAK VEL: 0.93 M/S
DOP CALC AO VTI: 19 CM
DOP CALC LVOT AREA: 5.1 CM2
DOP CALC LVOT DIAMETER: 2.55 CM
DOP CALC LVOT PEAK VEL: 0.82 M/S
DOP CALC LVOT STROKE VOLUME: 84.73 CM3
DOP CALCLVOT PEAK VEL VTI: 16.6 CM
E/E' RATIO: 38.57 M/S
ECHO LV POSTERIOR WALL: 1.4 CM (ref 0.6–1.1)
EOSINOPHIL # BLD AUTO: 0 K/UL (ref 0–0.5)
EOSINOPHIL NFR BLD: 0.2 % (ref 0–8)
ERYTHROCYTE [DISTWIDTH] IN BLOOD BY AUTOMATED COUNT: 15.1 % (ref 11.5–14.5)
EST. GFR  (NO RACE VARIABLE): >60 ML/MIN/1.73 M^2
FIO2: 60
FIO2: 60
FLOW: 10
FLOW: 10
FRACTIONAL SHORTENING: 13 % (ref 28–44)
GLUCOSE SERPL-MCNC: 206 MG/DL (ref 70–110)
HCO3 UR-SCNC: 31.9 MMOL/L (ref 24–28)
HCO3 UR-SCNC: 33.5 MMOL/L (ref 24–28)
HCT VFR BLD AUTO: 36.7 % (ref 40–54)
HGB BLD-MCNC: 11.2 G/DL (ref 14–18)
IMM GRANULOCYTES # BLD AUTO: 0.09 K/UL (ref 0–0.04)
IMM GRANULOCYTES NFR BLD AUTO: 0.4 % (ref 0–0.5)
INR PPP: 1.1 (ref 0.8–1.2)
INTERVENTRICULAR SEPTUM: 1.35 CM (ref 0.6–1.1)
IVC DIAMETER: 1.91 CM
IVRT: 146.53 MSEC
LA MAJOR: 5.2 CM
LA MINOR: 4.61 CM
LA WIDTH: 4.6 CM
LACTATE SERPL-SCNC: 1.8 MMOL/L (ref 0.5–2.2)
LACTATE SERPL-SCNC: 3 MMOL/L (ref 0.5–2.2)
LACTATE SERPL-SCNC: 3.3 MMOL/L (ref 0.5–2.2)
LEFT ATRIUM SIZE: 4.47 CM
LEFT ATRIUM VOLUME INDEX: 49.4 ML/M2
LEFT ATRIUM VOLUME: 85.42 CM3
LEFT INTERNAL DIMENSION IN SYSTOLE: 4.52 CM (ref 2.1–4)
LEFT VENTRICLE DIASTOLIC VOLUME INDEX: 74.66 ML/M2
LEFT VENTRICLE DIASTOLIC VOLUME: 129.16 ML
LEFT VENTRICLE MASS INDEX: 174 G/M2
LEFT VENTRICLE SYSTOLIC VOLUME INDEX: 54.1 ML/M2
LEFT VENTRICLE SYSTOLIC VOLUME: 93.63 ML
LEFT VENTRICULAR INTERNAL DIMENSION IN DIASTOLE: 5.19 CM (ref 3.5–6)
LEFT VENTRICULAR MASS: 300.76 G
LV LATERAL E/E' RATIO: 33.75 M/S
LV SEPTAL E/E' RATIO: 45 M/S
LVOT MG: 1.38 MMHG
LVOT MV: 0.55 CM/S
LYMPHOCYTES # BLD AUTO: 0.6 K/UL (ref 1–4.8)
LYMPHOCYTES NFR BLD: 2.9 % (ref 18–48)
MAGNESIUM SERPL-MCNC: 2.1 MG/DL (ref 1.6–2.6)
MCH RBC QN AUTO: 29.2 PG (ref 27–31)
MCHC RBC AUTO-ENTMCNC: 30.5 G/DL (ref 32–36)
MCV RBC AUTO: 96 FL (ref 82–98)
MODE: ABNORMAL
MODE: ABNORMAL
MONOCYTES # BLD AUTO: 1.5 K/UL (ref 0.3–1)
MONOCYTES NFR BLD: 7.3 % (ref 4–15)
MV PEAK E VEL: 1.35 M/S
NEUTROPHILS # BLD AUTO: 18.5 K/UL (ref 1.8–7.7)
NEUTROPHILS NFR BLD: 89 % (ref 38–73)
NRBC BLD-RTO: 0 /100 WBC
PCO2 BLDA: 58 MMHG (ref 35–45)
PCO2 BLDA: 76.6 MMHG (ref 35–45)
PH SMN: 7.25 [PH] (ref 7.35–7.45)
PH SMN: 7.35 [PH] (ref 7.35–7.45)
PISA TR MAX VEL: 1.56 M/S
PLATELET # BLD AUTO: 244 K/UL (ref 150–450)
PMV BLD AUTO: 9.8 FL (ref 9.2–12.9)
PO2 BLDA: 18 MMHG (ref 40–60)
PO2 BLDA: 32 MMHG (ref 40–60)
POC BE: 3 MMOL/L
POC BE: 5 MMOL/L
POC MOLECULAR INFLUENZA A AGN: NEGATIVE
POC MOLECULAR INFLUENZA B AGN: NEGATIVE
POC SATURATED O2: 18 % (ref 95–100)
POC SATURATED O2: 57 % (ref 95–100)
POC TCO2: 34 MMOL/L (ref 24–29)
POC TCO2: 36 MMOL/L (ref 24–29)
POCT GLUCOSE: 149 MG/DL (ref 70–110)
POCT GLUCOSE: 224 MG/DL (ref 70–110)
POCT GLUCOSE: 253 MG/DL (ref 70–110)
POTASSIUM SERPL-SCNC: 4.9 MMOL/L (ref 3.5–5.1)
PROCALCITONIN SERPL IA-MCNC: 0.12 NG/ML
PROT SERPL-MCNC: 7.6 G/DL (ref 6–8.4)
PROTHROMBIN TIME: 11.4 SEC (ref 9–12.5)
PV PEAK GRADIENT: 2 MMHG
PV PEAK VELOCITY: 0.65 M/S
RA MAJOR: 4.98 CM
RA PRESSURE ESTIMATED: 3 MMHG
RA WIDTH: 4.07 CM
RBC # BLD AUTO: 3.83 M/UL (ref 4.6–6.2)
RIGHT VENTRICULAR END-DIASTOLIC DIMENSION: 3.69 CM
RV TB RVSP: 5 MMHG
SAMPLE: ABNORMAL
SAMPLE: ABNORMAL
SARS-COV-2 RDRP RESP QL NAA+PROBE: NEGATIVE
SINUS: 4.22 CM
SITE: ABNORMAL
SITE: ABNORMAL
SODIUM SERPL-SCNC: 139 MMOL/L (ref 136–145)
STJ: 3.39 CM
TDI LATERAL: 0.04 M/S
TDI SEPTAL: 0.03 M/S
TDI: 0.04 M/S
TR MAX PG: 10 MMHG
TRICUSPID ANNULAR PLANE SYSTOLIC EXCURSION: 1.77 CM
TROPONIN I SERPL DL<=0.01 NG/ML-MCNC: 0.39 NG/ML (ref 0–0.03)
TROPONIN I SERPL DL<=0.01 NG/ML-MCNC: 1.01 NG/ML (ref 0–0.03)
TROPONIN I SERPL DL<=0.01 NG/ML-MCNC: 3.34 NG/ML (ref 0–0.03)
TV PEAK GRADIENT: 1 MMHG
TV REST PULMONARY ARTERY PRESSURE: 13 MMHG
WBC # BLD AUTO: 20.83 K/UL (ref 3.9–12.7)
Z-SCORE OF LEFT VENTRICULAR DIMENSION IN END DIASTOLE: 0.8
Z-SCORE OF LEFT VENTRICULAR DIMENSION IN END SYSTOLE: 3.33

## 2024-01-11 PROCEDURE — 25000242 PHARM REV CODE 250 ALT 637 W/ HCPCS: Performed by: STUDENT IN AN ORGANIZED HEALTH CARE EDUCATION/TRAINING PROGRAM

## 2024-01-11 PROCEDURE — 99900035 HC TECH TIME PER 15 MIN (STAT)

## 2024-01-11 PROCEDURE — 94644 CONT INHLJ TX 1ST HOUR: CPT

## 2024-01-11 PROCEDURE — 94640 AIRWAY INHALATION TREATMENT: CPT

## 2024-01-11 PROCEDURE — 20000000 HC ICU ROOM

## 2024-01-11 PROCEDURE — 99900026 HC AIRWAY MAINTENANCE (STAT)

## 2024-01-11 PROCEDURE — 93005 ELECTROCARDIOGRAM TRACING: CPT

## 2024-01-11 PROCEDURE — 87186 SC STD MICRODIL/AGAR DIL: CPT | Mod: 59 | Performed by: EMERGENCY MEDICINE

## 2024-01-11 PROCEDURE — 83605 ASSAY OF LACTIC ACID: CPT | Mod: 91 | Performed by: EMERGENCY MEDICINE

## 2024-01-11 PROCEDURE — 82962 GLUCOSE BLOOD TEST: CPT

## 2024-01-11 PROCEDURE — 80048 BASIC METABOLIC PNL TOTAL CA: CPT | Mod: XB

## 2024-01-11 PROCEDURE — 87070 CULTURE OTHR SPECIMN AEROBIC: CPT | Mod: 59 | Performed by: EMERGENCY MEDICINE

## 2024-01-11 PROCEDURE — 83605 ASSAY OF LACTIC ACID: CPT | Mod: 91 | Performed by: HOSPITALIST

## 2024-01-11 PROCEDURE — 84484 ASSAY OF TROPONIN QUANT: CPT | Mod: 91 | Performed by: HOSPITALIST

## 2024-01-11 PROCEDURE — 25000003 PHARM REV CODE 250: Performed by: INTERNAL MEDICINE

## 2024-01-11 PROCEDURE — 87502 INFLUENZA DNA AMP PROBE: CPT

## 2024-01-11 PROCEDURE — 27000221 HC OXYGEN, UP TO 24 HOURS

## 2024-01-11 PROCEDURE — 25500020 PHARM REV CODE 255: Performed by: EMERGENCY MEDICINE

## 2024-01-11 PROCEDURE — 84145 PROCALCITONIN (PCT): CPT | Performed by: EMERGENCY MEDICINE

## 2024-01-11 PROCEDURE — 99291 CRITICAL CARE FIRST HOUR: CPT | Mod: 25,,, | Performed by: INTERNAL MEDICINE

## 2024-01-11 PROCEDURE — 36415 COLL VENOUS BLD VENIPUNCTURE: CPT | Performed by: EMERGENCY MEDICINE

## 2024-01-11 PROCEDURE — 87205 SMEAR GRAM STAIN: CPT | Performed by: EMERGENCY MEDICINE

## 2024-01-11 PROCEDURE — 83880 ASSAY OF NATRIURETIC PEPTIDE: CPT | Performed by: EMERGENCY MEDICINE

## 2024-01-11 PROCEDURE — 96375 TX/PRO/DX INJ NEW DRUG ADDON: CPT

## 2024-01-11 PROCEDURE — 87205 SMEAR GRAM STAIN: CPT | Mod: 59 | Performed by: STUDENT IN AN ORGANIZED HEALTH CARE EDUCATION/TRAINING PROGRAM

## 2024-01-11 PROCEDURE — 94761 N-INVAS EAR/PLS OXIMETRY MLT: CPT | Mod: XB

## 2024-01-11 PROCEDURE — 87635 SARS-COV-2 COVID-19 AMP PRB: CPT | Performed by: EMERGENCY MEDICINE

## 2024-01-11 PROCEDURE — 25000003 PHARM REV CODE 250: Performed by: HOSPITALIST

## 2024-01-11 PROCEDURE — 63600175 PHARM REV CODE 636 W HCPCS: Performed by: STUDENT IN AN ORGANIZED HEALTH CARE EDUCATION/TRAINING PROGRAM

## 2024-01-11 PROCEDURE — 84484 ASSAY OF TROPONIN QUANT: CPT | Mod: 91 | Performed by: EMERGENCY MEDICINE

## 2024-01-11 PROCEDURE — 87077 CULTURE AEROBIC IDENTIFY: CPT | Mod: 59 | Performed by: EMERGENCY MEDICINE

## 2024-01-11 PROCEDURE — 85025 COMPLETE CBC W/AUTO DIFF WBC: CPT | Performed by: EMERGENCY MEDICINE

## 2024-01-11 PROCEDURE — 63600175 PHARM REV CODE 636 W HCPCS: Performed by: HOSPITALIST

## 2024-01-11 PROCEDURE — 63600175 PHARM REV CODE 636 W HCPCS: Performed by: EMERGENCY MEDICINE

## 2024-01-11 PROCEDURE — 85610 PROTHROMBIN TIME: CPT | Performed by: HOSPITALIST

## 2024-01-11 PROCEDURE — 82803 BLOOD GASES ANY COMBINATION: CPT

## 2024-01-11 PROCEDURE — 82800 BLOOD PH: CPT

## 2024-01-11 PROCEDURE — 25000242 PHARM REV CODE 250 ALT 637 W/ HCPCS: Performed by: EMERGENCY MEDICINE

## 2024-01-11 PROCEDURE — 85379 FIBRIN DEGRADATION QUANT: CPT | Performed by: EMERGENCY MEDICINE

## 2024-01-11 PROCEDURE — 85730 THROMBOPLASTIN TIME PARTIAL: CPT | Mod: 91 | Performed by: HOSPITALIST

## 2024-01-11 PROCEDURE — 36415 COLL VENOUS BLD VENIPUNCTURE: CPT | Mod: XB | Performed by: HOSPITALIST

## 2024-01-11 PROCEDURE — 80053 COMPREHEN METABOLIC PANEL: CPT | Performed by: EMERGENCY MEDICINE

## 2024-01-11 PROCEDURE — 93010 ELECTROCARDIOGRAM REPORT: CPT | Mod: ,,, | Performed by: INTERNAL MEDICINE

## 2024-01-11 PROCEDURE — 96367 TX/PROPH/DG ADDL SEQ IV INF: CPT

## 2024-01-11 PROCEDURE — 25000003 PHARM REV CODE 250: Performed by: EMERGENCY MEDICINE

## 2024-01-11 PROCEDURE — 96365 THER/PROPH/DIAG IV INF INIT: CPT

## 2024-01-11 PROCEDURE — 87070 CULTURE OTHR SPECIMN AEROBIC: CPT | Performed by: STUDENT IN AN ORGANIZED HEALTH CARE EDUCATION/TRAINING PROGRAM

## 2024-01-11 PROCEDURE — 83735 ASSAY OF MAGNESIUM: CPT | Performed by: EMERGENCY MEDICINE

## 2024-01-11 PROCEDURE — 25000003 PHARM REV CODE 250: Performed by: STUDENT IN AN ORGANIZED HEALTH CARE EDUCATION/TRAINING PROGRAM

## 2024-01-11 PROCEDURE — 99285 EMERGENCY DEPT VISIT HI MDM: CPT | Mod: 25

## 2024-01-11 PROCEDURE — 87040 BLOOD CULTURE FOR BACTERIA: CPT | Mod: 59 | Performed by: EMERGENCY MEDICINE

## 2024-01-11 RX ORDER — METHYLPREDNISOLONE SOD SUCC 125 MG
125 VIAL (EA) INJECTION
Status: COMPLETED | OUTPATIENT
Start: 2024-01-11 | End: 2024-01-11

## 2024-01-11 RX ORDER — METOPROLOL TARTRATE 25 MG/1
12.5 TABLET ORAL 2 TIMES DAILY
Status: DISCONTINUED | OUTPATIENT
Start: 2024-01-11 | End: 2024-01-11

## 2024-01-11 RX ORDER — CLOPIDOGREL BISULFATE 75 MG/1
75 TABLET ORAL NIGHTLY
Status: DISCONTINUED | OUTPATIENT
Start: 2024-01-11 | End: 2024-01-21

## 2024-01-11 RX ORDER — SODIUM CHLORIDE FOR INHALATION 3 %
4 VIAL, NEBULIZER (ML) INHALATION 2 TIMES DAILY
Status: DISCONTINUED | OUTPATIENT
Start: 2024-01-11 | End: 2024-01-19

## 2024-01-11 RX ORDER — SODIUM CHLORIDE 0.9 % (FLUSH) 0.9 %
10 SYRINGE (ML) INJECTION
Status: DISCONTINUED | OUTPATIENT
Start: 2024-01-11 | End: 2024-01-21

## 2024-01-11 RX ORDER — CALCIUM GLUCONATE 20 MG/ML
2 INJECTION, SOLUTION INTRAVENOUS
Status: DISCONTINUED | OUTPATIENT
Start: 2024-01-11 | End: 2024-01-21

## 2024-01-11 RX ORDER — ATORVASTATIN CALCIUM 40 MG/1
40 TABLET, FILM COATED ORAL DAILY
Status: DISCONTINUED | OUTPATIENT
Start: 2024-01-11 | End: 2024-01-21

## 2024-01-11 RX ORDER — SODIUM CHLORIDE 9 MG/ML
INJECTION, SOLUTION INTRAVENOUS CONTINUOUS
Status: DISCONTINUED | OUTPATIENT
Start: 2024-01-11 | End: 2024-01-11

## 2024-01-11 RX ORDER — ACETAMINOPHEN 325 MG/1
650 TABLET ORAL EVERY 4 HOURS PRN
Status: DISCONTINUED | OUTPATIENT
Start: 2024-01-11 | End: 2024-01-22

## 2024-01-11 RX ORDER — HYDROXYZINE HYDROCHLORIDE 25 MG/1
25 TABLET, FILM COATED ORAL 3 TIMES DAILY PRN
Status: DISCONTINUED | OUTPATIENT
Start: 2024-01-11 | End: 2024-01-21

## 2024-01-11 RX ORDER — POTASSIUM CHLORIDE 7.45 MG/ML
40 INJECTION INTRAVENOUS
Status: DISCONTINUED | OUTPATIENT
Start: 2024-01-11 | End: 2024-01-21

## 2024-01-11 RX ORDER — ASCORBIC ACID 250 MG
250 TABLET ORAL DAILY
Status: DISCONTINUED | OUTPATIENT
Start: 2024-01-11 | End: 2024-01-11

## 2024-01-11 RX ORDER — GLUCAGON 1 MG
1 KIT INJECTION
Status: DISCONTINUED | OUTPATIENT
Start: 2024-01-11 | End: 2024-01-21

## 2024-01-11 RX ORDER — INSULIN ASPART 100 [IU]/ML
0-5 INJECTION, SOLUTION INTRAVENOUS; SUBCUTANEOUS EVERY 6 HOURS PRN
Status: DISCONTINUED | OUTPATIENT
Start: 2024-01-11 | End: 2024-01-21

## 2024-01-11 RX ORDER — MAGNESIUM SULFATE HEPTAHYDRATE 40 MG/ML
2 INJECTION, SOLUTION INTRAVENOUS
Status: DISCONTINUED | OUTPATIENT
Start: 2024-01-11 | End: 2024-01-21

## 2024-01-11 RX ORDER — HEPARIN SODIUM,PORCINE/D5W 25000/250
0-40 INTRAVENOUS SOLUTION INTRAVENOUS CONTINUOUS
Status: DISCONTINUED | OUTPATIENT
Start: 2024-01-11 | End: 2024-01-13

## 2024-01-11 RX ORDER — IPRATROPIUM BROMIDE AND ALBUTEROL SULFATE 2.5; .5 MG/3ML; MG/3ML
3 SOLUTION RESPIRATORY (INHALATION) EVERY 4 HOURS
Status: DISCONTINUED | OUTPATIENT
Start: 2024-01-11 | End: 2024-01-18

## 2024-01-11 RX ORDER — MUPIROCIN 20 MG/G
OINTMENT TOPICAL 2 TIMES DAILY
Status: DISPENSED | OUTPATIENT
Start: 2024-01-11 | End: 2024-01-16

## 2024-01-11 RX ORDER — HEPARIN SODIUM 5000 [USP'U]/ML
5000 INJECTION, SOLUTION INTRAVENOUS; SUBCUTANEOUS EVERY 8 HOURS
Status: DISCONTINUED | OUTPATIENT
Start: 2024-01-11 | End: 2024-01-11

## 2024-01-11 RX ORDER — CALCIUM GLUCONATE 20 MG/ML
1 INJECTION, SOLUTION INTRAVENOUS
Status: DISCONTINUED | OUTPATIENT
Start: 2024-01-11 | End: 2024-01-21

## 2024-01-11 RX ORDER — TALC
6 POWDER (GRAM) TOPICAL NIGHTLY PRN
Status: DISCONTINUED | OUTPATIENT
Start: 2024-01-11 | End: 2024-01-22

## 2024-01-11 RX ORDER — NAPROXEN SODIUM 220 MG/1
81 TABLET, FILM COATED ORAL DAILY
Status: DISCONTINUED | OUTPATIENT
Start: 2024-01-11 | End: 2024-01-21

## 2024-01-11 RX ORDER — FAMOTIDINE 10 MG/ML
20 INJECTION INTRAVENOUS DAILY
Status: DISCONTINUED | OUTPATIENT
Start: 2024-01-11 | End: 2024-01-12

## 2024-01-11 RX ORDER — FLUOXETINE 20 MG/5ML
20 SOLUTION ORAL DAILY
Status: DISCONTINUED | OUTPATIENT
Start: 2024-01-11 | End: 2024-01-25 | Stop reason: HOSPADM

## 2024-01-11 RX ORDER — CALCIUM GLUCONATE 20 MG/ML
3 INJECTION, SOLUTION INTRAVENOUS
Status: DISCONTINUED | OUTPATIENT
Start: 2024-01-11 | End: 2024-01-21

## 2024-01-11 RX ORDER — FUROSEMIDE 10 MG/ML
40 INJECTION INTRAMUSCULAR; INTRAVENOUS
Status: DISCONTINUED | OUTPATIENT
Start: 2024-01-11 | End: 2024-01-11

## 2024-01-11 RX ORDER — ALBUTEROL SULFATE 2.5 MG/.5ML
15 SOLUTION RESPIRATORY (INHALATION)
Status: COMPLETED | OUTPATIENT
Start: 2024-01-11 | End: 2024-01-11

## 2024-01-11 RX ORDER — IPRATROPIUM BROMIDE AND ALBUTEROL SULFATE 2.5; .5 MG/3ML; MG/3ML
3 SOLUTION RESPIRATORY (INHALATION) EVERY 4 HOURS PRN
Status: DISCONTINUED | OUTPATIENT
Start: 2024-01-11 | End: 2024-01-11

## 2024-01-11 RX ORDER — MAGNESIUM SULFATE HEPTAHYDRATE 40 MG/ML
4 INJECTION, SOLUTION INTRAVENOUS
Status: DISCONTINUED | OUTPATIENT
Start: 2024-01-11 | End: 2024-01-21

## 2024-01-11 RX ORDER — FUROSEMIDE 10 MG/ML
40 INJECTION INTRAMUSCULAR; INTRAVENOUS DAILY
Status: DISCONTINUED | OUTPATIENT
Start: 2024-01-11 | End: 2024-01-12

## 2024-01-11 RX ORDER — MAGNESIUM SULFATE HEPTAHYDRATE 40 MG/ML
2 INJECTION, SOLUTION INTRAVENOUS ONCE
Status: COMPLETED | OUTPATIENT
Start: 2024-01-11 | End: 2024-01-11

## 2024-01-11 RX ORDER — PROCHLORPERAZINE EDISYLATE 5 MG/ML
5 INJECTION INTRAMUSCULAR; INTRAVENOUS EVERY 6 HOURS PRN
Status: DISCONTINUED | OUTPATIENT
Start: 2024-01-11 | End: 2024-01-21

## 2024-01-11 RX ORDER — POTASSIUM CHLORIDE 7.45 MG/ML
80 INJECTION INTRAVENOUS
Status: DISCONTINUED | OUTPATIENT
Start: 2024-01-11 | End: 2024-01-21

## 2024-01-11 RX ORDER — POTASSIUM CHLORIDE 7.45 MG/ML
60 INJECTION INTRAVENOUS
Status: DISCONTINUED | OUTPATIENT
Start: 2024-01-11 | End: 2024-01-21

## 2024-01-11 RX ORDER — FOLIC ACID 1 MG/1
1 TABLET ORAL DAILY
Status: DISCONTINUED | OUTPATIENT
Start: 2024-01-11 | End: 2024-01-21

## 2024-01-11 RX ORDER — ONDANSETRON HYDROCHLORIDE 2 MG/ML
4 INJECTION, SOLUTION INTRAVENOUS EVERY 8 HOURS PRN
Status: DISCONTINUED | OUTPATIENT
Start: 2024-01-11 | End: 2024-01-25 | Stop reason: HOSPADM

## 2024-01-11 RX ADMIN — FLUOXETINE HYDROCHLORIDE 20 MG: 20 SOLUTION ORAL at 10:01

## 2024-01-11 RX ADMIN — FUROSEMIDE 40 MG: 10 INJECTION, SOLUTION INTRAVENOUS at 10:01

## 2024-01-11 RX ADMIN — FOLIC ACID 1 MG: 1 TABLET ORAL at 09:01

## 2024-01-11 RX ADMIN — IPRATROPIUM BROMIDE AND ALBUTEROL SULFATE 3 ML: 2.5; .5 SOLUTION RESPIRATORY (INHALATION) at 07:01

## 2024-01-11 RX ADMIN — SODIUM CHLORIDE SOLN NEBU 3% 4 ML: 3 NEBU SOLN at 07:01

## 2024-01-11 RX ADMIN — INSULIN ASPART 3 UNITS: 100 INJECTION, SOLUTION INTRAVENOUS; SUBCUTANEOUS at 12:01

## 2024-01-11 RX ADMIN — MAGNESIUM SULFATE HEPTAHYDRATE 2 G: 2 INJECTION, SOLUTION INTRAVENOUS at 01:01

## 2024-01-11 RX ADMIN — SODIUM CHLORIDE SOLN NEBU 3% 4 ML: 3 NEBU SOLN at 03:01

## 2024-01-11 RX ADMIN — HEPARIN SODIUM 5000 UNITS: 5000 INJECTION INTRAVENOUS; SUBCUTANEOUS at 06:01

## 2024-01-11 RX ADMIN — ATORVASTATIN CALCIUM 40 MG: 40 TABLET, FILM COATED ORAL at 09:01

## 2024-01-11 RX ADMIN — HEPARIN SODIUM 12 UNITS/KG/HR: 10000 INJECTION, SOLUTION INTRAVENOUS at 09:01

## 2024-01-11 RX ADMIN — SODIUM CHLORIDE 500 ML: 9 INJECTION, SOLUTION INTRAVENOUS at 03:01

## 2024-01-11 RX ADMIN — ALBUTEROL SULFATE 15 MG: 2.5 SOLUTION RESPIRATORY (INHALATION) at 01:01

## 2024-01-11 RX ADMIN — CLOPIDOGREL BISULFATE 75 MG: 75 TABLET ORAL at 03:01

## 2024-01-11 RX ADMIN — PIPERACILLIN AND TAZOBACTAM 4.5 G: 4; .5 INJECTION, POWDER, LYOPHILIZED, FOR SOLUTION INTRAVENOUS; PARENTERAL at 01:01

## 2024-01-11 RX ADMIN — IPRATROPIUM BROMIDE AND ALBUTEROL SULFATE 3 ML: 2.5; .5 SOLUTION RESPIRATORY (INHALATION) at 11:01

## 2024-01-11 RX ADMIN — IOHEXOL 70 ML: 350 INJECTION, SOLUTION INTRAVENOUS at 01:01

## 2024-01-11 RX ADMIN — VANCOMYCIN HYDROCHLORIDE 1250 MG: 1.25 INJECTION, POWDER, LYOPHILIZED, FOR SOLUTION INTRAVENOUS at 03:01

## 2024-01-11 RX ADMIN — PIPERACILLIN AND TAZOBACTAM 4.5 G: 4; .5 INJECTION, POWDER, LYOPHILIZED, FOR SOLUTION INTRAVENOUS; PARENTERAL at 05:01

## 2024-01-11 RX ADMIN — PIPERACILLIN AND TAZOBACTAM 4.5 G: 4; .5 INJECTION, POWDER, LYOPHILIZED, FOR SOLUTION INTRAVENOUS; PARENTERAL at 09:01

## 2024-01-11 RX ADMIN — IPRATROPIUM BROMIDE AND ALBUTEROL SULFATE 3 ML: 2.5; .5 SOLUTION RESPIRATORY (INHALATION) at 03:01

## 2024-01-11 RX ADMIN — MELATONIN TAB 3 MG 6 MG: 3 TAB at 09:01

## 2024-01-11 RX ADMIN — IPRATROPIUM BROMIDE AND ALBUTEROL SULFATE 3 ML: 2.5; .5 SOLUTION RESPIRATORY (INHALATION) at 08:01

## 2024-01-11 RX ADMIN — FAMOTIDINE 20 MG: 10 INJECTION INTRAVENOUS at 09:01

## 2024-01-11 RX ADMIN — ASPIRIN 81 MG CHEWABLE TABLET 81 MG: 81 TABLET CHEWABLE at 09:01

## 2024-01-11 RX ADMIN — MUPIROCIN: 20 OINTMENT TOPICAL at 10:01

## 2024-01-11 RX ADMIN — SODIUM CHLORIDE SOLN NEBU 3% 4 ML: 3 NEBU SOLN at 08:01

## 2024-01-11 RX ADMIN — METHYLPREDNISOLONE SODIUM SUCCINATE 125 MG: 125 INJECTION, POWDER, FOR SOLUTION INTRAMUSCULAR; INTRAVENOUS at 12:01

## 2024-01-11 RX ADMIN — MUPIROCIN: 20 OINTMENT TOPICAL at 09:01

## 2024-01-11 RX ADMIN — CLOPIDOGREL BISULFATE 75 MG: 75 TABLET ORAL at 09:01

## 2024-01-11 RX ADMIN — SODIUM CHLORIDE: 9 INJECTION, SOLUTION INTRAVENOUS at 04:01

## 2024-01-11 NOTE — ASSESSMENT & PLAN NOTE
Patient admitted with shortness of breath. His BNP is higher than ever before despite him appearing euvolemic.      BNP  Recent Labs   Lab 01/11/24  0021   BNP 2,467*     CT RML infiltrate  Recent Labs   Lab 01/11/24  0855   TROPONINI 3.344*     EKG not able to see in MUSE- will need to find. Per Cardiology, no STEMI    Start 40mg IV lasix QD. Monitor UOP  TTE pending   Cardiology consulted for NSTEMI

## 2024-01-11 NOTE — PROGRESS NOTES
74 yo male ex smoker w/ PMHx COPD on home O2, HTN, Hx Laryngeal ca s/p surgery and now s/p trach and PEG here w/ worsening hypoxia from home.     CTA:  No acute pulmonary thromboemboli.    Patchy areas of consolidation in the right middle lobe, new from prior.  Correlate clinically for pneumonia or aspiration.    Mediastinal lymph node enlargement, similar compared to prior.    S/p cx, pt is on vanco and zosyn.   IV steroids and nebs for COPD exacerbation.     VBG was 7.35/58    Now noted to have borderline BP 70s/50s w/ MAP 60-65  Good mentation    BUN/ Cr 26/0.9  BNP 2408  LA negative  WBC 20.8  Rapid flu and COVID negative    Adding NS@ 100ml/hr s/p 500ml bolus for minimal hypoxia noted.   Would continue to watch for s/s fluid overload.     Sugar control.   Hep SC and Pepcid for prophy noted.     Please call us for further assistance.   D/w bedside RN in detail after camera assessment.

## 2024-01-11 NOTE — HPI
Mr Fareed Richard Jr. 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 He is on 6L trach collar at home and receives tube feeding. He had shortness of breath, fever, and increased trach output. Denies chest pain.     In ED, he was tachycardic and tachypneic with SpO2 85% on 8L. He was given albuterol, vanc/zosyn, solumedrol. He was admitted to ICU.

## 2024-01-11 NOTE — ASSESSMENT & PLAN NOTE
Patient with known CAD s/p stent placement, which is uncontrolled Will continue ASA, Plavix, and Statin and monitor for S/Sx of angina/ACS. Continue to monitor on telemetry.   - see NSTEMI

## 2024-01-11 NOTE — ASSESSMENT & PLAN NOTE
Chronic, controlled. Latest blood pressure and vitals reviewed-     Temp:  [98.1 °F (36.7 °C)-99.1 °F (37.3 °C)]   Pulse:  []   Resp:  [22-40]   BP: ()/(53-76)   SpO2:  [85 %-100 %] .   Home meds for hypertension were reviewed and noted below.   Hypertension Medications               furosemide (LASIX) 40 MG tablet Take 0.5 tablets (20 mg total) by mouth once daily.    metoprolol tartrate (LOPRESSOR) 25 MG tablet Take 0.5 tablets (12.5 mg total) by mouth 2 (two) times daily.          - hold BP Rx because BP is borderline low     Will utilize p.r.n. blood pressure medication only if patient's blood pressure greater than 180/110 and he develops symptoms such as worsening chest pain or shortness of breath.

## 2024-01-11 NOTE — PROGRESS NOTES
"Pharmacokinetic Initial Assessment: IV Vancomycin    Assessment/Plan:    Initiate intravenous vancomycin with loading dose of 1250 mg once followed by a maintenance dose of vancomycin 1750 mg IV every 24 hours  Desired empiric serum trough concentration is 10 to 20 mcg/mL  Draw vancomycin trough level 60 min prior to third dose on 1/13/24 at approximately 0230  Pharmacy will continue to follow and monitor vancomycin.      Please contact pharmacy at extension [phone] with any questions regarding this assessment.     Thank you for the consult,   Trung Navarro       Patient brief summary:  Fareed Richard Jr. is a 73 y.o. male initiated on antimicrobial therapy with IV Vancomycin for treatment of suspected  pneumonia    Drug Allergies:   Review of patient's allergies indicates:   Allergen Reactions    Ativan [lorazepam] Anxiety       Actual Body Weight:   60.1 kg    Renal Function:   Estimated Creatinine Clearance: 61.2 mL/min (based on SCr of 0.9 mg/dL).,     Dialysis Method (if applicable):  N/A    CBC (last 72 hours):  Recent Labs   Lab Result Units 01/11/24  0021   WBC K/uL 20.83*   Hemoglobin g/dL 11.2*   Hematocrit % 36.7*   Platelets K/uL 244   Gran % % 89.0*   Lymph % % 2.9*   Mono % % 7.3   Eosinophil % % 0.2   Basophil % % 0.2   Differential Method  Automated       Metabolic Panel (last 72 hours):  Recent Labs   Lab Result Units 01/11/24  0021   Sodium mmol/L 139   Potassium mmol/L 4.9   Chloride mmol/L 94*   CO2 mmol/L 30*   Glucose mg/dL 206*   BUN mg/dL 26*   Creatinine mg/dL 0.9   Albumin g/dL 3.3*   Total Bilirubin mg/dL 0.7   Alkaline Phosphatase U/L 117   AST U/L 24   ALT U/L 18   Magnesium mg/dL 2.1       Drug levels (last 3 results):  No results for input(s): "VANCOMYCINRA", "VANCORANDOM", "VANCOMYCINPE", "VANCOPEAK", "VANCOMYCINTR", "VANCOTROUGH" in the last 72 hours.    Microbiologic Results:  Microbiology Results (last 7 days)       Procedure Component Value Units Date/Time    Culture, " Respiratory with Gram Stain [[id number]]     Order Status: No result Specimen: Respiratory from Tracheal Aspirate     Blood culture [[id number]] Collected: 01/11/24 0144    Order Status: Sent Specimen: Blood from Peripheral, Lower Arm, Left Updated: 01/11/24 0148    Blood Culture #1 **CANNOT BE ORDERED STAT** [[id number]] Collected: 01/11/24 0116    Order Status: Sent Specimen: Blood from Peripheral, Forearm, Right Updated: 01/11/24 0121    Culture, Respiratory with Gram Stain [[id number]] Collected: 01/11/24 0053    Order Status: Sent Specimen: Sputum Updated: 01/11/24 0109

## 2024-01-11 NOTE — ED NOTES
Pt presents to ED c/o SOB, Pt has PEG tube and Trach in place. Pt denies CP and HA at this time. Pt respirations are labored.

## 2024-01-11 NOTE — ASSESSMENT & PLAN NOTE
Admitted with NSTEMI. No chest pain.  Recent Labs   Lab 01/11/24  0855   TROPONINI 3.344*     EKG unable to be reviewed currently but per Cardiology no STEMI  He has know CAD  Started asa, plavix, heparin gtt, statin.   TTE pending.   Cardiology consulted.

## 2024-01-11 NOTE — ADMISSIONCARE
AdmissionCare    Guideline: COPD - INPT, Inpatient    Based on the indications selected for the patient, the bed status of Admit to Inpatient was determined to be MET    The following indications were selected as present at the time of evaluation of the patient:      Hemodynamic instability, as indicated by 1 or more of the following:   -     Vital sign abnormality not readily corrected by appropriate treatment, as indicated by 1 or more of the following:    -      Tachycardia that persists despite appropriate treatment (eg, volume repletion, treatment of pain, treatment of underlying cause)     -      Hypotension that persists despite appropriate treatment (eg, volume repletion, treatment of underlying cause)     -    - Acute respiratory failure (eg, new need for invasive or noninvasive mechanical ventilation)   - Hypercarbia (PCO2 greater than 40 mm Hg (5.3 kPa))-induced respiratory acidosis (pH less than 7.35) that persists despite observation care    AdmissionCare documentation entered by: LEORA Napier    [facility], 27th edition, Copyright © 2023 [facility] [place], [facility] All Rights Reserved.  [id number]:00

## 2024-01-11 NOTE — NURSING
[facility] Intensive Care  ICU Shift Summary  Date: 1/11/2024      Prehospitalization: Home  Admit Date / LOS : 1/11/2024/ 0 days    Diagnosis: Acute on chronic respiratory failure with hypoxia    Consults:        Active: Cardio and SW       Needed: N/A     Code Status: Full Code   Advanced Directive: Not Received    LDA:  Lines/Drains/Airways       Drain  Duration                  Gastrostomy/Enterostomy 01/04/22 Percutaneous endoscopic gastrostomy (PEG)  days              Airway  Duration             Adult Surgical Airway 03/16/23 1014 Shiley Cuffed 6.0 301 days              Peripheral Intravenous Line  Duration                  Peripheral IV - Single Lumen 01/10/24 2341 18 G Left;Posterior Forearm <1 day         Peripheral IV - Single Lumen 01/11/24 0013 20 G Right Antecubital <1 day                  Central Lines/Site/Justification:Patient Does Not Have Central Line  Urinary Cath/Order/Justification:Patient Does Not Have Urinary Catheter    Vasopressors/Infusions:    heparin (porcine) in D5W 14 Units/kg/hr (01/11/24 1716)          GOALS: Volume/ Hemodynamic: N/A                     RASS: 0  alert and calm    Pain Management: none       Pain Controlled: yes     Rhythm: NSR    Respiratory Device:     Oxygen Concentration (%):  [40-60] 40                 Most Recent SBT/ SAT: N/A       MOVE Screen: PASS  ICU Liberation: no    VTE Prophylaxis: Pharm and Mechanical  Mobility: Bedrest  Stress Ulcer Prophylaxis: Yes    Isolation: No active isolations    Dietary:   Current Diet Order   Procedures    Diet NPO Except for: Sips with Medication     Order Specific Question:   Except for     Answer:   Sips with Medication      Tolerance: yes  Advancement: @ goal    I & O (24h):    Intake/Output Summary (Last 24 hours) at 1/11/2024 1740  Last data filed at 1/11/2024 1700  Gross per 24 hour   Intake 2860.53 ml   Output 800 ml   Net 2060.53 ml        Restraints: No    Significant Dates:  Post Op Date: N/A  Rescue Date:  N/A  Imaging/ Diagnostics: N/A    Noteworthy Labs:  none    COVID Test: (--)  CBC/Anemia Labs: Coags:    Recent Labs   Lab 01/11/24  0021   WBC 20.83*   HGB 11.2*   HCT 36.7*      MCV 96   RDW 15.1*    Recent Labs   Lab 01/11/24  0855 01/11/24  1557   INR 1.1  --    APTT 26.3 36.0*        Chemistries:   Recent Labs   Lab 01/11/24  0021      K 4.9   CL 94*   CO2 30*   BUN 26*   CREATININE 0.9   CALCIUM 9.5   PROT 7.6   BILITOT 0.7   ALKPHOS 117   ALT 18   AST 24   MG 2.1        Cardiac Enzymes: Ejection Fractions:    Recent Labs     01/11/24  0311 01/11/24  0855   TROPONINI 1.011* 3.344*    EF   Date Value Ref Range Status   03/20/2023 60 % Final        POCT Glucose: HbA1c:    Recent Labs   Lab 01/11/24  0254 01/11/24  1210   POCTGLUCOSE 224* 253*    Hemoglobin A1C   Date Value Ref Range Status   08/29/2023 5.8 (H) 4.0 - 5.6 % Final     Comment:     ADA Screening Guidelines:  5.7-6.4%  Consistent with prediabetes  >or=6.5%  Consistent with diabetes    High levels of fetal hemoglobin interfere with the HbA1C  assay. Heterozygous hemoglobin variants (HbS, HgC, etc)do  not significantly interfere with this assay.   However, presence of multiple variants may affect accuracy.             ICU LOS 14h  Level of Care: Critical Care    Chart Check: 12 HR Done  Shift Summary/Plan for the shift: none

## 2024-01-11 NOTE — NURSING
[facility] [place]  Nurses Note -- 4 Eyes      1/11/2024       Skin assessed on: Q Shift      [x] No Pressure Injuries Present    [x]Prevention Measures Documented    [] Yes LDA  for Pressure Injury Previously documented     [] Yes New Pressure Injury Discovered   [] LDA for New Pressure Injury Added      Attending RN:  America NAILS RN     Second RN:  KAMILAH Bobby

## 2024-01-11 NOTE — ED PROVIDER NOTES
Encounter Date: 1/11/2024       History     Chief Complaint   Patient presents with    Shortness of Breath     Patient arrives via EMS with SOB since 1500. Trach, PEG, history of COPD. EMS reports initial SPO2 on his normal 6 LPM at 87% - increased to 91% with breathing treatment with wheezing. Now 85% - cannot speak, missing tongue.     73-year-old male presenting with 2 days of shortness of breath and cough.  History of trach and PEG status post glossectomy and laryngectomy due to head neck cancer.  Notes worsening cough.  Hypoxic at home despite 6 L home oxygen.  History limited as the patient is nonverbal.      Review of patient's allergies indicates:   Allergen Reactions    Ativan [lorazepam] Anxiety     Past Medical History:   Diagnosis Date    Cancer     skin to Rt forearm and face    COPD (chronic obstructive pulmonary disease)     Hyperlipidemia     Hypertension     Pseudoaneurysm      Past Surgical History:   Procedure Laterality Date    ABDOMINAL SURGERY      stents placed in liver and large intestines, per patient    ANGIOGRAPHY OF AORTIC ARCH  3/27/2023    Procedure: Aortogram, Aortic Arch;  Surgeon: Tim Bhatt MD;  Location: [facility] CATH LAB;  Service: Cardiology;;    AORTOGRAPHY WITH SERIALOGRAPHY N/A 3/27/2023    Procedure: AORTOGRAM, WITH SERIALOGRAPHY;  Surgeon: Tim Bhatt MD;  Location: [facility] CATH LAB;  Service: Cardiology;  Laterality: N/A;    CAROTID STENT      COLONOSCOPY N/A 09/27/2022    Procedure: COLONOSCOPY;  Surgeon: Donnie Peterson MD;  [facility]  Service: Endoscopy;  Laterality: N/A;    COLONOSCOPY N/A 09/30/2022    Procedure: COLONOSCOPY;  Surgeon: Joy Cabrera MD;  Location: [facility] ENDO (2ND FLR);  Service: Endoscopy;  Laterality: N/A;    CORONARY STENT PLACEMENT  01/2000    DISSECTION OF NECK Bilateral 01/04/2022    Procedure: DISSECTION, NECK;  Surgeon: Naeem Smalls MD;  Location: [facility] OR 2ND FLR;  Service: ENT;  Laterality: Bilateral;     ESOPHAGOGASTRODUODENOSCOPY N/A 09/27/2022    Procedure: EGD (ESOPHAGOGASTRODUODENOSCOPY);  Surgeon: Donnie Peterson MD;  Location: [facility] ENDO (2ND FLR);  Service: Endoscopy;  Laterality: N/A;    ESOPHAGOGASTRODUODENOSCOPY N/A 09/30/2022    Procedure: EGD (ESOPHAGOGASTRODUODENOSCOPY);  Surgeon: Joy Cabrera MD;  Location: [facility] ENDO (2ND FLR);  Service: Endoscopy;  Laterality: N/A;    ESOPHAGOGASTRODUODENOSCOPY W/ PEG N/A 01/04/2022    Procedure: EGD, WITH PEG TUBE INSERTION;  Surgeon: Anthony Calabrese MD;  [facility];  Service: General;  Laterality: N/A;    EYE SURGERY      Cataract bilateral    femoral stents      bilateral    FLAP PROCEDURE N/A 01/03/2022    Procedure: CREATION, FREE FLAP;  Surgeon: Naeem Smalls MD;  [facility];  Service: ENT;  Laterality: N/A;    FLAP PROCEDURE Right 01/04/2022    Procedure: CREATION, FREE FLAP;  Surgeon: Stacey Conde MD;  [facility];  Service: ENT;  Laterality: Right;  Ischemic start 1203  Ischemic stop 1353    GLOSSECTOMY N/A 01/04/2022    Procedure: GLOSSECTOMY;  Surgeon: Naeem Smalls MD;  [facility];  Service: ENT;  Laterality: N/A;    LEFT HEART CATHETERIZATION Left 3/23/2023    Procedure: Left heart cath;  Surgeon: Tacos Benitez MD;  Location: [facility] CATH LAB;  Service: Cardiology;  Laterality: Left;    PERCUTANEOUS TRANSLUMINAL BALLOON ANGIOPLASTY OF CORONARY ARTERY Left 3/27/2023    Procedure: Angioplasty-coronary;  Surgeon: Tim Bhatt MD;  Location: [facility] CATH LAB;  Service: Cardiology;  Laterality: Left;  not before 9am, R rad access, 6 Fr EBU 3.5 guide    RADICAL NECK DISSECTION Left 01/03/2022    Procedure: DISSECTION, NECK, RADICAL;  Surgeon: Naeem Smalls MD;  [facility];  Service: ENT;  Laterality: Left;    SKIN BIOPSY      SKIN CANCER EXCISION      STENT, CORONARY, MULTI VESSEL  3/27/2023    Procedure: Stent, Coronary, Multi Vessel;  Surgeon: Tim TAMAYO  MD Miller;  Location: [facility] CATH LAB;  Service: Cardiology;;    TRACHEOTOMY N/A 2022    Procedure: TRACHEOTOMY;  Surgeon: Naeem Smalls MD;  Location: [facility] OR [address] [place] [address];  Service: ENT;  Laterality: N/A;    VASCULAR SURGERY       No family history on file.  Social History     Tobacco Use    Smoking status: Former     Current packs/day: 0.00     Average packs/day: 2.0 packs/day for 55.0 years (110.0 ttl pk-yrs)     Types: Cigarettes     Start date: 1963     Quit date: 2018     Years since quittin.7    Smokeless tobacco: Never    Tobacco comments:     3/3 ppd x 40 yrs. Currently 3-4 cigarettes daily .He is trying  to quit. Is using a Vapor cigarettes  2-3 x's a day.   Substance Use Topics    Alcohol use: Not Currently    Drug use: No     Review of Systems   Unable to perform ROS: Patient nonverbal       Physical Exam     Initial Vitals   BP Pulse Resp Temp SpO2   24 0009 24 0009 24 0009 24 0016 24 0009   100/68 (!) 122 (!) 28 98.1 °F (36.7 °C) (!) 85 %      MAP       --                Physical Exam    Nursing note and vitals reviewed.  Constitutional: He appears well-developed and well-nourished. He is not diaphoretic. No distress.   HENT:   Head: Normocephalic and atraumatic.   Eyes: Conjunctivae and EOM are normal. Pupils are equal, round, and reactive to light. No scleral icterus.   Neck: Neck supple.   Normal range of motion.  Cardiovascular:  Normal rate, regular rhythm, normal heart sounds and intact distal pulses.     Exam reveals no gallop and no friction rub.       No murmur heard.  Pulmonary/Chest: No stridor. No respiratory distress. He has wheezes. He has no rhonchi. He has no rales.   Mild tachypnea   Abdominal: Abdomen is soft. Bowel sounds are normal. He exhibits no distension. There is no abdominal tenderness. There is no rebound and no guarding.   Musculoskeletal:         General: No tenderness or edema. Normal range of motion.      Cervical back:  Normal range of motion and neck supple.     Neurological: He is alert and oriented to person, place, and time. He has normal strength. No cranial nerve deficit. GCS score is 15. GCS eye subscore is 4. GCS verbal subscore is 5. GCS motor subscore is 6.   Skin: Skin is warm and dry. No rash noted.   Psychiatric: He has a normal mood and affect. His behavior is normal.         ED Course   Procedures  Labs Reviewed   CBC W/ AUTO DIFFERENTIAL - Abnormal; Notable for the following components:       Result Value    WBC 20.83 (*)     RBC 3.83 (*)     Hemoglobin 11.2 (*)     Hematocrit 36.7 (*)     MCHC 30.5 (*)     RDW 15.1 (*)     Gran # (ANC) 18.5 (*)     Immature Grans (Abs) 0.09 (*)     Lymph # 0.6 (*)     Mono # 1.5 (*)     Gran % 89.0 (*)     Lymph % 2.9 (*)     All other components within normal limits   COMPREHENSIVE METABOLIC PANEL - Abnormal; Notable for the following components:    Chloride 94 (*)     CO2 30 (*)     Glucose 206 (*)     BUN 26 (*)     Albumin 3.3 (*)     All other components within normal limits   TROPONIN I - Abnormal; Notable for the following components:    Troponin I 0.393 (*)     All other components within normal limits   B-TYPE NATRIURETIC PEPTIDE - Abnormal; Notable for the following components:    BNP 2,467 (*)     All other components within normal limits   D DIMER, QUANTITATIVE - Abnormal; Notable for the following components:    D-Dimer 1.78 (*)     All other components within normal limits   LACTIC ACID, PLASMA - Abnormal; Notable for the following components:    Lactate (Lactic Acid) 3.3 (*)     All other components within normal limits   ISTAT PROCEDURE - Abnormal; Notable for the following components:    POC PH 7.249 (*)     POC PCO2 76.6 (*)     POC PO2 18 (*)     POC HCO3 33.5 (*)     POC BE 3 (*)     POC TCO2 36 (*)     All other components within normal limits   CULTURE, RESPIRATORY   CULTURE, BLOOD   CULTURE, BLOOD   MAGNESIUM   PROCALCITONIN   TROPONIN I   LACTIC ACID, PLASMA    POCT INFLUENZA A/B MOLECULAR   SARS-COV-2 RDRP GENE     EKG Readings: (Independently Interpreted)   Initial Reading: No STEMI. Rhythm: Sinus Tachycardia. Heart Rate: 114. Ectopy: No Ectopy.   No ST segment elevation or depression concerning for acute ischemia.          Imaging Results              CTA Chest Non-Coronary (PE Studies) (In process)  Result time 01/11/24 01:45:37                     X-Ray Chest AP Portable (Final result)  Result time 01/11/24 01:10:45      Final result by Shaheen Joseph MD (01/11/24 01:10:45)                   Impression:      Abnormal airspace opacification and patchy consolidative change in the right lower lung zone which may be due to aspiration or pneumonia.  Similar chronic left basilar opacity with blunting of the left costophrenic angle.      Electronically signed by: Shaheen Joseph MD  Date:    01/11/2024  Time:    01:10               Narrative:    EXAMINATION:  XR CHEST AP PORTABLE    CLINICAL HISTORY:  Chest Pain;    TECHNIQUE:  Single frontal view of the chest was performed.    COMPARISON:  11/24/2023.    FINDINGS:  Cardiac silhouette is stable in size.  Tracheostomy tube is in similar position.  Lungs are symmetrically expanded.  There is chronic blunting of the left costophrenic angle which may be due to atelectasis or scarring and/or small left pleural effusion.  There is new abnormal airspace opacification and patchy consolidation seen in the right lower lung zone.  No pneumothorax.  No acute osseous abnormality identified.                                       Medications   magnesium sulfate 2g in water 50mL IVPB (premix) (2 g Intravenous New Bag 1/11/24 0152)   vancomycin - pharmacy to dose (has no administration in time range)   vancomycin 1,250 mg in dextrose 5 % (D5W) 250 mL IVPB (Vial-Mate) (has no administration in time range)   albuterol sulfate nebulizer solution 15 mg (15 mg Nebulization Given 1/11/24 0101)   methylPREDNISolone sodium succinate injection  125 mg (125 mg Intravenous Given 1/11/24 0024)   piperacillin-tazobactam (ZOSYN) 4.5 g in dextrose 5 % in water (D5W) 100 mL IVPB (MB+) (4.5 g Intravenous New Bag 1/11/24 0126)   iohexoL (OMNIPAQUE 350) injection 70 mL (70 mLs Intravenous Given 1/11/24 0133)     Medical Decision Making  Amount and/or Complexity of Data Reviewed  Labs: ordered.  Radiology: ordered.    Risk  Prescription drug management.  Decision regarding hospitalization.                          Medical Decision Making:   Initial Assessment:   74 yo gentleman with a history of head neck cancer, COPD, smoker, is presenting with pneumonia, hypoxia, COPD exacerbation.  Additionally he may have some mild heart failure with an elevated troponin, likely due to demand ischemia.  Two days of shortness of breath.  Worsening today.  Currently on BiPAP after continuous nebs, magnesium, Solu-Medrol.  Broad-spectrum antibiotics started appropriate for hospital-acquired pneumonia, last admission approximately 1 month ago for 11 days.  I  History of glossectomy and laryngectomy with trach and peg.  Lasix held given soft blood pressure.  Patient will be admitted to Hospital Medicine, ICU for hypercapnic and hypoxic respiratory failure with likely pneumonia and COPD exacerbation.  Differential Diagnosis:   CHF, COPD, pneumonia, metabolic derangement             Clinical Impression:  Final diagnoses:  [R07.9] Chest pain  [J44.1] COPD exacerbation          ED Disposition Condition    Admit Stable                Epi Andrew MD  01/11/24 [id number]

## 2024-01-11 NOTE — H&P
[facility]  History & Physical    Patient Name: Fareed Richard Jr.  MRN: [id number]  Patient Class: IP- Inpatient  Admission Date: 1/11/2024  Attending Physician: Caprice Nava MD   Primary Care Provider: Kodi Tubbs MD         Patient information was obtained from patient and ER records.     Subjective:     Principal Problem:Acute on chronic respiratory failure with hypoxia    Chief Complaint:   Chief Complaint   Patient presents with    Shortness of Breath     Patient arrives via EMS with SOB since 1500. Trach, PEG, history of COPD. EMS reports initial SPO2 on his normal 6 LPM at 87% - increased to 91% with breathing treatment with wheezing. Now 85% - cannot speak, missing tongue.        HPI: Mr Fareed Richard Jr. 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 He is on 6L trach collar at home and receives tube feeding. He had shortness of breath, fever, and increased trach output. Denies chest pain.     In ED, he was tachycardic and tachypneic with SpO2 85% on 8L. He was given albuterol, vanc/zosyn, solumedrol. He was admitted to ICU.     Past Medical History:   Diagnosis Date    Cancer     skin to Rt forearm and face    COPD (chronic obstructive pulmonary disease)     Hyperlipidemia     Hypertension     Pseudoaneurysm        Past Surgical History:   Procedure Laterality Date    ABDOMINAL SURGERY      stents placed in liver and large intestines, per patient    ANGIOGRAPHY OF AORTIC ARCH  3/27/2023    Procedure: Aortogram, Aortic Arch;  Surgeon: Tim Bhatt MD;  Location: [facility] CATH LAB;  Service: Cardiology;;    AORTOGRAPHY WITH SERIALOGRAPHY N/A 3/27/2023    Procedure: AORTOGRAM, WITH SERIALOGRAPHY;  Surgeon: Tim Bhatt MD;  Location: [facility] CATH LAB;  Service: Cardiology;  Laterality: N/A;    CAROTID STENT       COLONOSCOPY N/A 09/27/2022    Procedure: COLONOSCOPY;  Surgeon: Donnie Peterson MD;  Location: [facility] ENDO (2ND FLR);  Service: Endoscopy;  Laterality: N/A;    COLONOSCOPY N/A 09/30/2022    Procedure: COLONOSCOPY;  Surgeon: Joy Cabrera MD;  Location: [facility] ENDO (2ND FLR);  Service: Endoscopy;  Laterality: N/A;    CORONARY STENT PLACEMENT  01/2000    DISSECTION OF NECK Bilateral 01/04/2022    Procedure: DISSECTION, NECK;  Surgeon: Naeem Smalls MD;  Location: [facility] OR 2ND FLR;  Service: ENT;  Laterality: Bilateral;    ESOPHAGOGASTRODUODENOSCOPY N/A 09/27/2022    Procedure: EGD (ESOPHAGOGASTRODUODENOSCOPY);  Surgeon: Donnie Peterson MD;  Location: [facility] ENDO (2ND FLR);  Service: Endoscopy;  Laterality: N/A;    ESOPHAGOGASTRODUODENOSCOPY N/A 09/30/2022    Procedure: EGD (ESOPHAGOGASTRODUODENOSCOPY);  Surgeon: Joy Cabrera MD;  Location: [facility] ENDO (2ND FLR);  Service: Endoscopy;  Laterality: N/A;    ESOPHAGOGASTRODUODENOSCOPY W/ PEG N/A 01/04/2022    Procedure: EGD, WITH PEG TUBE INSERTION;  Surgeon: Anthony Calabrese MD;  [facility];  Service: General;  Laterality: N/A;    EYE SURGERY      Cataract bilateral    femoral stents      bilateral    FLAP PROCEDURE N/A 01/03/2022    Procedure: CREATION, FREE FLAP;  Surgeon: Naeem Smalls MD;  [facility]: [facility] FLR;  Service: ENT;  Laterality: N/A;    FLAP PROCEDURE Right 01/04/2022    Procedure: CREATION, FREE FLAP;  Surgeon: Stacey Conde MD;  Location: [facility] OR [facility] FLR;  Service: ENT;  Laterality: Right;  Ischemic start 1203  Ischemic stop 1353    GLOSSECTOMY N/A 01/04/2022    Procedure: GLOSSECTOMY;  Surgeon: Naeem Smalls MD;  [facility];  Service: ENT;  Laterality: N/A;    LEFT HEART CATHETERIZATION Left 3/23/2023    Procedure: Left heart cath;  Surgeon: Tacos Benitez MD;  Location: [facility] CATH LAB;  Service: Cardiology;  Laterality: Left;    PERCUTANEOUS TRANSLUMINAL BALLOON ANGIOPLASTY OF CORONARY ARTERY Left 3/27/2023     Procedure: Angioplasty-coronary;  Surgeon: Tim Bhatt MD;  Location: [facility] CATH LAB;  Service: Cardiology;  Laterality: Left;  not before 9am, R rad access, 6 Fr EBU 3.5 guide    RADICAL NECK DISSECTION Left 01/03/2022    Procedure: DISSECTION, NECK, RADICAL;  Surgeon: Naeem Smalls MD;  Location: [facility] OR [address] [place] [address];  Service: ENT;  Laterality: Left;    SKIN BIOPSY      SKIN CANCER EXCISION      STENT, CORONARY, MULTI VESSEL  3/27/2023    Procedure: Stent, Coronary, Multi Vessel;  Surgeon: Tim Bhatt MD;  Location: [facility] CATH LAB;  Service: Cardiology;;    TRACHEOTOMY N/A 01/04/2022    Procedure: TRACHEOTOMY;  Surgeon: Naeem Smalls MD;  Location: [facility] OR [address] [place] [address];  Service: ENT;  Laterality: N/A;    VASCULAR SURGERY         Review of patient's allergies indicates:   Allergen Reactions    Ativan [lorazepam] Anxiety       No current facility-administered medications on file prior to encounter.     Current Outpatient Medications on File Prior to Encounter   Medication Sig    albuterol-ipratropium (DUO-NEB) 2.5 mg-0.5 mg/3 mL nebulizer solution Take 3 mLs by nebulization every 4 (four) hours as needed for Wheezing. Rescue    ascorbic acid, vitamin C, (VITAMIN C) 100 MG tablet Take 100 mg by mouth once daily.    aspirin 81 MG Chew Take 1 tablet (81 mg total) by mouth once daily.    atorvastatin (LIPITOR) 40 MG tablet 1 tablet (40 mg total) by Per G Tube route every evening. (Patient taking differently: 40 mg by Per G Tube route once daily.)    bisacodyL (DULCOLAX) 10 mg Supp Place 1 suppository (10 mg total) rectally daily as needed.    clopidogreL (PLAVIX) 75 mg tablet Take 1 tablet (75 mg total) by mouth once daily. (Patient taking differently: Take 75 mg by mouth nightly.)    ferrous sulfate 325 (65 FE) MG EC tablet Take 325 mg by mouth 3 (three) times daily with meals.    FLUoxetine (PROZAC) 20 mg/5 mL (4 mg/mL) solution Take 20 mg by mouth once daily.    folic acid (FOLVITE) 1 MG tablet  Take 1 mg by mouth once daily.    furosemide (LASIX) 40 MG tablet Take 0.5 tablets (20 mg total) by mouth once daily. (Patient taking differently: Take 20 mg by mouth nightly.)    glycopyrrolate (CUVPOSA) 1 mg/5 mL (0.2 mg/mL) Soln Take 5 mLs (1 mg total) by mouth 3 (three) times daily as needed (TO REDUCE SECRETIONS).    hydrOXYzine HCL (ATARAX) 25 MG tablet SMARTSI.5 Tablet(s) Gastro Tube Every 8 Hours PRN    melatonin (MELATIN) 3 mg tablet 1 tablet (3 mg total) by Per G Tube route nightly.    metoprolol tartrate (LOPRESSOR) 25 MG tablet Take 0.5 tablets (12.5 mg total) by mouth 2 (two) times daily.    omeprazole (PRILOSEC) 40 MG capsule Take 40 mg (contents of one capsule) twice daily through PEG tube. Mix contents of capsule with 10 mL applesauce. (Patient taking differently: 40 mg 2 (two) times a day. Take 40 mg (contents of one capsule) twice daily through PEG tube. Mix contents of capsule with 10 mL applesauce.)    polyethylene glycol (GLYCOLAX) 17 gram PwPk 17 g by Per G Tube route 2 (two) times daily.    sodium chloride 3% 3 % nebulizer solution Take 4 mLs by nebulization 2 (two) times a day.    thiamine (VITAMIN B-1) 50 MG tablet Take 50 mg by mouth once daily.    WIXELA INHUB 250-50 mcg/dose diskus inhaler SMARTSI Puff(s) By Mouth Every 12 Hours    [DISCONTINUED] losartan (COZAAR) 50 MG tablet 1 tablet (50 mg total) by Per G Tube route once daily.     Family History    None       Tobacco Use    Smoking status: Former     Current packs/day: 0.00     Average packs/day: 2.0 packs/day for 55.0 years (110.0 ttl pk-yrs)     Types: Cigarettes     Start date: 1963     Quit date: 2018     Years since quittin.7    Smokeless tobacco: Never    Tobacco comments:     3/3 ppd x 40 yrs. Currently 3-4 cigarettes daily .He is trying  to quit. Is using a Vapor cigarettes  2-3 x's a day.   Substance and Sexual Activity    Alcohol use: Not Currently    Drug use: No    Sexual activity: Not Currently      Review of Systems   Constitutional:  Positive for fatigue and fever.   Respiratory:  Positive for cough and shortness of breath.    Cardiovascular:  Negative for chest pain and leg swelling.   Gastrointestinal:  Negative for abdominal pain.   Genitourinary:  Negative for difficulty urinating.   Psychiatric/Behavioral:  Negative for confusion.      Objective:     Vital Signs (Most Recent):  Temp: 98.7 °F (37.1 °C) (01/11/24 0701)  Pulse: 90 (01/11/24 1000)  Resp: (!) 28 (01/11/24 1000)  BP: 101/64 (01/11/24 1000)  SpO2: 98 % (01/11/24 1000) Vital Signs (24h Range):  Temp:  [98.1 °F (36.7 °C)-99.1 °F (37.3 °C)] 98.7 °F (37.1 °C)  Pulse:  [] 90  Resp:  [22-40] 28  SpO2:  [85 %-100 %] 98 %  BP: ()/(53-76) 101/64     Weight: 60.1 kg (132 lb 7.9 oz)  Body mass index is 19.57 kg/m².     Physical Exam  Vitals and nursing note reviewed.   Constitutional:       General: He is not in acute distress.     Appearance: He is ill-appearing (chronically). He is not toxic-appearing.   HENT:      Head: Normocephalic and atraumatic.      Mouth/Throat:      Mouth: Mucous membranes are moist.   Cardiovascular:      Rate and Rhythm: Normal rate and regular rhythm.   Pulmonary:      Effort: Pulmonary effort is normal.      Breath sounds: Normal breath sounds. No wheezing, rhonchi or rales.      Comments: 10L trach collar  Abdominal:      General: Bowel sounds are normal. There is no distension.      Palpations: Abdomen is soft. There is no mass.      Tenderness: There is no abdominal tenderness. There is no guarding.      Comments: PEG in place   Musculoskeletal:      Right lower leg: No edema.      Left lower leg: No edema.   Skin:     General: Skin is warm and dry.   Neurological:      Mental Status: He is alert. Mental status is at baseline.                Significant Labs: All pertinent labs within the past 24 hours have been reviewed.    Significant Imaging: I have reviewed all pertinent imaging results/findings within  the past 24 hours.  Assessment/Plan:     * Acute on chronic respiratory failure with hypoxia  Patient admitted with Hypoxic which is Acute on Chronic.  he is on home oxygen at 6 LPM trach collar. Signs/symptoms of respiratory failure include- increased work of breathing and respiratory distress present on admission.   - Labs and images were reviewed. Patient Has not has a recent ABG.   - Supplemental oxygen was provided and noted-  trach collar    - Respiratory failure is due to- CHF and Pneumonia   - CT with RML PNA. ET aspirate pending. Continue vanc, zosyn for now  - BNP higher than ever before. TTE pending. NSTEMI present. Start lasix 40mg IV daily  - CTA no PE  - improving from admit      Pneumonia  RML infiltrate  Follow up sputum culture results  - continue vanc, zosyn       Normocytic anemia  Patient's anemia is currently controlled. Has not received any PRBCs to date. Etiology likely d/t  chronic disease  Current CBC reviewed-   Lab Results   Component Value Date    HGB 11.2 (L) 01/11/2024    HCT 36.7 (L) 01/11/2024     Monitor serial CBC and transfuse if patient becomes hemodynamically unstable, symptomatic or H/H drops below 7/21.    Acute on chronic diastolic heart failure  Patient admitted with shortness of breath. His BNP is higher than ever before despite him appearing euvolemic.      BNP  Recent Labs   Lab 01/11/24  0021   BNP 2,467*     CT RML infiltrate  Recent Labs   Lab 01/11/24  0855   TROPONINI 3.344*     EKG not able to see in MUSE- will need to find. Per Cardiology, no STEMI    Start 40mg IV lasix QD. Monitor UOP  TTE pending   Cardiology consulted for NSTEMI        NSTEMI (non-ST elevated myocardial infarction)  Admitted with NSTEMI. No chest pain.  Recent Labs   Lab 01/11/24  0855   TROPONINI 3.344*     EKG unable to be reviewed currently but per Cardiology no STEMI  He has know CAD  Started asa, plavix, heparin gtt, statin.   TTE pending.   Cardiology consulted.         PEG (percutaneous  endoscopic gastrostomy) status  Patient noted to have a percutaneous endoscopic gastrostomy tube in place. I have personally inspected the tube.Tube was placed prior to this admission There are no signs of drainage or infection around the site. The tube is patent. Medications have converted to liquid form if available.  Routine care to be done by wound care and nursing staff.   - resume tube feeds         Tracheostomy present  Noted      ACP (advance care planning)  Advance Care Planning    Date: 01/11/2024  Full code status           Severe protein-calorie malnutrition  Nutrition consulted. Most recent weight and BMI monitored-     Measurements:  Wt Readings from Last 1 Encounters:   01/11/24 60.1 kg (132 lb 7.9 oz)   Body mass index is 19.57 kg/m².    Patient has been screened and assessed by RD.    Interventions/Recommendations (treatment strategy):     - continue tube feeds    Other hyperlipidemia  Continue statin      Primary hypertension  Chronic, controlled. Latest blood pressure and vitals reviewed-     Temp:  [98.1 °F (36.7 °C)-99.1 °F (37.3 °C)]   Pulse:  []   Resp:  [22-40]   BP: ()/(53-76)   SpO2:  [85 %-100 %] .   Home meds for hypertension were reviewed and noted below.   Hypertension Medications               furosemide (LASIX) 40 MG tablet Take 0.5 tablets (20 mg total) by mouth once daily.    metoprolol tartrate (LOPRESSOR) 25 MG tablet Take 0.5 tablets (12.5 mg total) by mouth 2 (two) times daily.          - hold BP Rx because BP is borderline low     Will utilize p.r.n. blood pressure medication only if patient's blood pressure greater than 180/110 and he develops symptoms such as worsening chest pain or shortness of breath.    Coronary artery disease involving native coronary artery of native heart without angina pectoris  Patient with known CAD s/p stent placement, which is uncontrolled Will continue ASA, Plavix, and Statin and monitor for S/Sx of angina/ACS. Continue to monitor on  telemetry.   - see NSTEMI    Peripheral vascular disease  Known PAD from last angiogram 3/2023  Continue asa, plavix, statin         VTE Risk Mitigation (From admission, onward)           Ordered     heparin 25,000 units in dextrose 5% (100 units/ml) IV bolus from bag - ADDITIONAL PRN BOLUS - 60 units/kg (max bolus 4000 units)  As needed (PRN)        Question:  Heparin Infusion Adjustment (DO NOT MODIFY ANSWER)  Answer:  [email or web address]\epic\Images\Pharmacy\HeparinInfusions\heparin LOW INTENSITY nomogram for [facility] [id number].pdf    01/11/24 0823     heparin 25,000 units in dextrose 5% (100 units/ml) IV bolus from bag - ADDITIONAL PRN BOLUS - 30 units/kg (max bolus 4000 units)  As needed (PRN)        Question:  Heparin Infusion Adjustment (DO NOT MODIFY ANSWER)  Answer:  [email or web address]\epic\Images\Pharmacy\HeparinInfusions\heparin LOW INTENSITY nomogram for [facility] [id number].pdf    01/11/24 0823     heparin 25,000 units in dextrose 5% 250 mL (100 units/mL) infusion LOW INTENSITY nomogram - [facility]  Continuous        Question:  Begin at (units/kg/hr)  Answer:  12    01/11/24 0823     IP VTE HIGH RISK PATIENT  Once         01/11/24 0225     Place sequential compression device  Until discontinued         01/11/24 0225                  Critical care time spent on the evaluation and treatment of severe organ dysfunction, review of pertinent labs and imaging studies, discussions with consulting providers and discussions with patient/family: 60 minutes.         AdmissionCare    Guideline: COPD - INPT, Inpatient    Based on the indications selected for the patient, the bed status of Admit to Inpatient was determined to be MET    The following indications were selected as present at the time of evaluation of the patient:      Hemodynamic instability, as indicated by 1 or more of the following:   -     Vital sign abnormality not readily corrected by appropriate treatment, as indicated by 1 or more of the following:    -      Tachycardia that  persists despite appropriate treatment (eg, volume repletion, treatment of pain, treatment of underlying cause)     -      Hypotension that persists despite appropriate treatment (eg, volume repletion, treatment of underlying cause)     -    - Acute respiratory failure (eg, new need for invasive or noninvasive mechanical ventilation)   - Hypercarbia (PCO2 greater than 40 mm Hg (5.3 kPa))-induced respiratory acidosis (pH less than 7.35) that persists despite observation care    AdmissionCare documentation entered by: LEORA Napier    [facility], 27th edition, Copyright © 2023 [facility] All Rights Reserved.  [id number]    Caprice Nava MD  Department of Hospital Medicine  [facility] [place] - Intensive Care

## 2024-01-11 NOTE — SUBJECTIVE & OBJECTIVE
Past Medical History:   Diagnosis Date    Cancer     skin to Rt forearm and face    COPD (chronic obstructive pulmonary disease)     Hyperlipidemia     Hypertension     Pseudoaneurysm        Past Surgical History:   Procedure Laterality Date    ABDOMINAL SURGERY      stents placed in liver and large intestines, per patient    ANGIOGRAPHY OF AORTIC ARCH  3/27/2023    Procedure: Aortogram, Aortic Arch;  Surgeon: Tim Bhatt MD;  Location: [facility] CATH LAB;  Service: Cardiology;;    AORTOGRAPHY WITH SERIALOGRAPHY N/A 3/27/2023    Procedure: AORTOGRAM, WITH SERIALOGRAPHY;  Surgeon: Tim Bhatt MD;  Location: [facility] CATH LAB;  Service: Cardiology;  Laterality: N/A;    CAROTID STENT      COLONOSCOPY N/A 09/27/2022    Procedure: COLONOSCOPY;  Surgeon: Donnie Peterson MD;  Location[facility]O [facility]  Service: Endoscopy;  Laterality: N/A;    COLONOSCOPY N/A 09/30/2022    Procedure: COLONOSCOPY;  Surgeon: Joy Cabrera MD;  Location[facility]  Service: Endoscopy;  Laterality: N/A;    CORONARY STENT PLACEMENT  01/2000    DISSECTION OF NECK Bilateral 01/04/2022    Procedure: DISSECTION, NECK;  Surgeon: Naeem Smalls MD;  [facility];  Service: ENT;  Laterality: Bilateral;    ESOPHAGOGASTRODUODENOSCOPY N/A 09/27/2022    Procedure: EGD (ESOPHAGOGASTRODUODENOSCOPY);  Surgeon: Donnie Peterson MD;  Location[facility]  Service: Endoscopy;  Laterality: N/A;    ESOPHAGOGASTRODUODENOSCOPY N/A 09/30/2022    Procedure: EGD (ESOPHAGOGASTRODUODENOSCOPY);  Surgeon: Joy Cabrera MD;  Location: [facility] ENDO (2ND FLR);  Service: Endoscopy;  Laterality: N/A;    ESOPHAGOGASTRODUODENOSCOPY W/ PEG N/A 01/04/2022    Procedure: EGD, WITH PEG TUBE INSERTION;  Surgeon: Anthony Calabrese MD;  [facility];  Service: General;  Laterality: N/A;    EYE SURGERY      Cataract bilateral    femoral stents      bilateral    FLAP PROCEDURE N/A 01/03/2022    Procedure: CREATION, FREE FLAP;   Surgeon: Naeem Smalls MD;  [facility];  Service: ENT;  Laterality: N/A;    FLAP PROCEDURE Right 01/04/2022    Procedure: CREATION, FREE FLAP;  Surgeon: Stacey Conde MD;  [facility];  Service: ENT;  Laterality: Right;  Ischemic start 1203  Ischemic stop 1353    GLOSSECTOMY N/A 01/04/2022    Procedure: GLOSSECTOMY;  Surgeon: Naeem Smalls MD;  [facility];  Service: ENT;  Laterality: N/A;    LEFT HEART CATHETERIZATION Left 3/23/2023    Procedure: Left heart cath;  Surgeon: Tacos Benitez MD;  Location: [facility] CATH LAB;  Service: Cardiology;  Laterality: Left;    PERCUTANEOUS TRANSLUMINAL BALLOON ANGIOPLASTY OF CORONARY ARTERY Left 3/27/2023    Procedure: Angioplasty-coronary;  Surgeon: Tim Bhatt MD;  Location: [facility] CATH LAB;  Service: Cardiology;  Laterality: Left;  not before 9am, R rad access, 6 Fr EBU 3.5 guide    RADICAL NECK DISSECTION Left 01/03/2022    Procedure: DISSECTION, NECK, RADICAL;  Surgeon: Naeem Smalls MD;  Location: [facility] OR [address] [place] [address];  Service: ENT;  Laterality: Left;    SKIN BIOPSY      SKIN CANCER EXCISION      STENT, CORONARY, MULTI VESSEL  3/27/2023    Procedure: Stent, Coronary, Multi Vessel;  Surgeon: Tim Bhatt MD;  Location: [facility] CATH LAB;  Service: Cardiology;;    TRACHEOTOMY N/A 01/04/2022    Procedure: TRACHEOTOMY;  Surgeon: Naeem Smalls MD;  [facility];  Service: ENT;  Laterality: N/A;    VASCULAR SURGERY         Review of patient's allergies indicates:   Allergen Reactions    Ativan [lorazepam] Anxiety       No current facility-administered medications on file prior to encounter.     Current Outpatient Medications on File Prior to Encounter   Medication Sig    albuterol-ipratropium (DUO-NEB) 2.5 mg-0.5 mg/3 mL nebulizer solution Take 3 mLs by nebulization every 4 (four) hours as needed for Wheezing. Rescue    ascorbic acid, vitamin C, (VITAMIN C) 100 MG tablet Take 100 mg by mouth  once daily.    aspirin 81 MG Chew Take 1 tablet (81 mg total) by mouth once daily.    atorvastatin (LIPITOR) 40 MG tablet 1 tablet (40 mg total) by Per G Tube route every evening. (Patient taking differently: 40 mg by Per G Tube route once daily.)    bisacodyL (DULCOLAX) 10 mg Supp Place 1 suppository (10 mg total) rectally daily as needed.    clopidogreL (PLAVIX) 75 mg tablet Take 1 tablet (75 mg total) by mouth once daily. (Patient taking differently: Take 75 mg by mouth nightly.)    ferrous sulfate 325 (65 FE) MG EC tablet Take 325 mg by mouth 3 (three) times daily with meals.    FLUoxetine (PROZAC) 20 mg/5 mL (4 mg/mL) solution Take 20 mg by mouth once daily.    folic acid (FOLVITE) 1 MG tablet Take 1 mg by mouth once daily.    furosemide (LASIX) 40 MG tablet Take 0.5 tablets (20 mg total) by mouth once daily. (Patient taking differently: Take 20 mg by mouth nightly.)    glycopyrrolate (CUVPOSA) 1 mg/5 mL (0.2 mg/mL) Soln Take 5 mLs (1 mg total) by mouth 3 (three) times daily as needed (TO REDUCE SECRETIONS).    hydrOXYzine HCL (ATARAX) 25 MG tablet SMARTSI.5 Tablet(s) Gastro Tube Every 8 Hours PRN    melatonin (MELATIN) 3 mg tablet 1 tablet (3 mg total) by Per G Tube route nightly.    metoprolol tartrate (LOPRESSOR) 25 MG tablet Take 0.5 tablets (12.5 mg total) by mouth 2 (two) times daily.    omeprazole (PRILOSEC) 40 MG capsule Take 40 mg (contents of one capsule) twice daily through PEG tube. Mix contents of capsule with 10 mL applesauce. (Patient taking differently: 40 mg 2 (two) times a day. Take 40 mg (contents of one capsule) twice daily through PEG tube. Mix contents of capsule with 10 mL applesauce.)    polyethylene glycol (GLYCOLAX) 17 gram PwPk 17 g by Per G Tube route 2 (two) times daily.    sodium chloride 3% 3 % nebulizer solution Take 4 mLs by nebulization 2 (two) times a day.    thiamine (VITAMIN B-1) 50 MG tablet Take 50 mg by mouth once daily.    WIXELA INHUB 250-50 mcg/dose diskus inhaler  SMARTSI Puff(s) By Mouth Every 12 Hours    [DISCONTINUED] losartan (COZAAR) 50 MG tablet 1 tablet (50 mg total) by Per G Tube route once daily.     Family History    None       Tobacco Use    Smoking status: Former     Current packs/day: 0.00     Average packs/day: 2.0 packs/day for 55.0 years (110.0 ttl pk-yrs)     Types: Cigarettes     Start date: 1963     Quit date: 2018     Years since quittin.7    Smokeless tobacco: Never    Tobacco comments:     3/3 ppd x 40 yrs. Currently 3-4 cigarettes daily .He is trying  to quit. Is using a Vapor cigarettes  2-3 x's a day.   Substance and Sexual Activity    Alcohol use: Not Currently    Drug use: No    Sexual activity: Not Currently     Review of Systems   Constitutional:  Positive for fatigue and fever.   Respiratory:  Positive for cough and shortness of breath.    Cardiovascular:  Negative for chest pain and leg swelling.   Gastrointestinal:  Negative for abdominal pain.   Genitourinary:  Negative for difficulty urinating.   Psychiatric/Behavioral:  Negative for confusion.      Objective:     Vital Signs (Most Recent):  Temp: 98.7 °F (37.1 °C) (24 0701)  Pulse: 90 (24 1000)  Resp: (!) 28 (24 1000)  BP: 101/64 (24 1000)  SpO2: 98 % (24 1000) Vital Signs (24h Range):  Temp:  [98.1 °F (36.7 °C)-99.1 °F (37.3 °C)] 98.7 °F (37.1 °C)  Pulse:  [] 90  Resp:  [22-40] 28  SpO2:  [85 %-100 %] 98 %  BP: ()/(53-76) 101/64     Weight: 60.1 kg (132 lb 7.9 oz)  Body mass index is 19.57 kg/m².     Physical Exam  Vitals and nursing note reviewed.   Constitutional:       General: He is not in acute distress.     Appearance: He is ill-appearing (chronically). He is not toxic-appearing.   HENT:      Head: Normocephalic and atraumatic.      Mouth/Throat:      Mouth: Mucous membranes are moist.   Cardiovascular:      Rate and Rhythm: Normal rate and regular rhythm.   Pulmonary:      Effort: Pulmonary effort is normal.      Breath  sounds: Normal breath sounds. No wheezing, rhonchi or rales.      Comments: 10L trach collar  Abdominal:      General: Bowel sounds are normal. There is no distension.      Palpations: Abdomen is soft. There is no mass.      Tenderness: There is no abdominal tenderness. There is no guarding.      Comments: PEG in place   Musculoskeletal:      Right lower leg: No edema.      Left lower leg: No edema.   Skin:     General: Skin is warm and dry.   Neurological:      Mental Status: He is alert. Mental status is at baseline.                Significant Labs: All pertinent labs within the past 24 hours have been reviewed.    Significant Imaging: I have reviewed all pertinent imaging results/findings within the past 24 hours.

## 2024-01-11 NOTE — CONSULTS
"  [facility] [place] - Intensive Care  Adult Nutrition  Consult Note    SUMMARY     Recommendations    1. Consider Isosource 1.5 @ gr 50 mL/hr providing 1800 kcal, 82 g protein and 917 mL free water meeing 100% EEN/EPN  2. Continue to monitor for tf tolerance and advancements  3. Monitor weights and labs  4. Collaboration with medical providers    Goals: 1. Pt to meet % EEN/EPN by RD follow up  Nutrition Goal Status: new  Communication of RD Recs:  (POC)    Assessment and Plan    Nutrition Problem  Inadequate oral intake    Related to (etiology):   NPO status    Signs and Symptoms (as evidenced by):   Intake < needs    Interventions/Recommendations (treatment strategy):  Collaboration with medical providers    Nutrition Diagnosis Status:   New     Reason for Assessment    Reason For Assessment: consult  Diagnosis:  (acute on chronic respiratory failure with hypoxia)  Relevant Medical History:   Past Medical History:   Diagnosis Date    Cancer     skin to Rt forearm and face    COPD (chronic obstructive pulmonary disease)     Hyperlipidemia     Hypertension     Pseudoaneurysm     General Information Comments: RD consult for recs. Spoke with nurse who states patient is ordered isosource 1.5. NKFA. LBM 1/10/24. BMI 19.57, normal BMI. Consider Isosource 1.5 @ gr 50 mL/hr providing 1800 kcal, 82 g protein and 917 mL free water meeing 100% EEN/EPN. Per NFPE 1/11/24, pt appears nourished.   Interdisciplinary Rounds: did not attend  Nutrition Discharge Planning: Pt to tolerate tube feed    Nutrition Risk Screen    Nutrition Risk Screen: tube feeding or parenteral nutrition    Nutrition/Diet History    Food Allergies: NKFA    Anthropometrics    Temp: 97.8 °F (36.6 °C)  Height Method: Stated  Height: 5' 9" (175.3 cm)  Height (inches): 69 in  Weight Method: Bed Scale  Weight: 59.9 kg (132 lb)  Weight (lb): 132 lb  Ideal Body Weight (IBW), Male: 160 lb  % Ideal Body Weight, Male (lb): 82.5 %  BMI (Calculated): 19.5  BMI Grade: " 18.5-24.9 - normal       Lab/Procedures/Meds    Pertinent Labs Reviewed: reviewed  BMP  Lab Results   Component Value Date     2024    K 4.9 2024    CL 94 (L) 2024    CO2 30 (H) 2024    BUN 26 (H) 2024    CREATININE 0.9 2024    CALCIUM 9.5 2024    ANIONGAP 15 2024    EGFRNORACEVR >60 2024      Pertinent Medications Reviewed: reviewed  Current Outpatient Medications   Medication Instructions    albuterol-ipratropium (DUO-NEB) 2.5 mg-0.5 mg/3 mL nebulizer solution 3 mLs, Nebulization, Every 4 hours PRN, Rescue    ascorbic acid (vitamin C) (VITAMIN C) 100 mg, Oral, Daily    aspirin 81 mg, Oral, Daily    atorvastatin (LIPITOR) 40 mg, Per G Tube, Nightly    bisacodyL (DULCOLAX) 10 mg, Rectal, Daily PRN    clopidogreL (PLAVIX) 75 mg, Oral, Daily    ferrous sulfate 325 mg, Oral, 3 times daily with meals    FLUoxetine (PROZAC) 20 mg, Oral, Daily    folic acid (FOLVITE) 1 mg, Oral, Daily    furosemide (LASIX) 20 mg, Oral, Daily    glycopyrrolate (CUVPOSA) 1 mg/5 mL (0.2 mg/mL) Soln Take 5 mLs (1 mg total) by mouth 3 (three) times daily as needed (TO REDUCE SECRETIONS).    hydrOXYzine HCL (ATARAX) 25 MG tablet SMARTSI.5 Tablet(s) Gastro Tube Every 8 Hours PRN    melatonin (MELATIN) 3 mg, Per G Tube, Nightly    metoprolol tartrate (LOPRESSOR) 12.5 mg, Oral, 2 times daily    omeprazole (PRILOSEC) 40 MG capsule Take 40 mg (contents of one capsule) twice daily through PEG tube. Mix contents of capsule with 10 mL applesauce.    polyethylene glycol (GLYCOLAX) 17 g, Per G Tube, 2 times daily    sodium chloride 3% 3 % nebulizer solution 4 mLs, Nebulization, 2 times daily    thiamine (VITAMIN B-1) 50 mg, Oral, Daily    WIXELA INHUB 250-50 mcg/dose diskus inhaler SMARTSI Puff(s) By Mouth Every 12 Hours        Estimated/Assessed Needs    Weight Used For Calorie Calculations: 59.9 kg (132 lb)  Energy Calorie Requirements (kcal): 1727 kcal  Energy Need Method: [place]  Protein Requirements: 60 g  Weight Used For Protein Calculations: 59.9 kg (132 lb)     Estimated Fluid Requirement Method: RDA Method  RDA Method (mL): 1727         Nutrition Prescription Ordered    Current Diet Order: NPO    Evaluation of Received Nutrient/Fluid Intake    I/O: +1 L  Energy Calories Required: not meeting needs  Protein Required: not meeting needs  Fluid Required: not meeting needs  Comments: LBM 1/10/24  % Intake of Estimated Energy Needs: 0 - 25 %  % Meal Intake: NPO    Nutrition Risk    Level of Risk/Frequency of Follow-up: moderate - high       Monitor and Evaluation    Food and Nutrient Intake: enteral nutrition intake, parenteral nutrition intake  Food and Nutrient Adminstration: enteral and parenteral nutrition administration  Knowledge/Beliefs/Attitudes: food and nutrition knowledge/skill, beliefs and attitudes  Physical Activity and Function: nutrition-related ADLs and IADLs, factors affecting access to physical activity  Anthropometric Measurements: weight, weight change, body mass index  Biochemical Data, Medical Tests and Procedures: inflammatory profile  Nutrition-Focused Physical Findings: overall appearance       Nutrition Follow-Up    RD Follow-up?: Yes    Ambar Dutta, Registration Eligible, provisional LDN

## 2024-01-11 NOTE — ASSESSMENT & PLAN NOTE
Patient admitted with Hypoxic which is Acute on Chronic.  he is on home oxygen at 6 LPM trach collar. Signs/symptoms of respiratory failure include- increased work of breathing and respiratory distress present on admission.   - Labs and images were reviewed. Patient Has not has a recent ABG.   - Supplemental oxygen was provided and noted-  trach collar    - Respiratory failure is due to- CHF and Pneumonia   - CT with RML PNA. ET aspirate pending. Continue vanc, zosyn for now  - BNP higher than ever before. TTE pending. NSTEMI present. Start lasix 40mg IV daily  - CTA no PE  - improving from admit

## 2024-01-11 NOTE — ASSESSMENT & PLAN NOTE
Patient noted to have a percutaneous endoscopic gastrostomy tube in place. I have personally inspected the tube.Tube was placed prior to this admission There are no signs of drainage or infection around the site. The tube is patent. Medications have converted to liquid form if available.  Routine care to be done by wound care and nursing staff.   - resume tube feeds

## 2024-01-11 NOTE — PLAN OF CARE
Patient remain in ICU alert and oriented X 4, inj heparin continue @ 14 u , patient have slight bleeding from trach site inform to doctor , no any intervention ordered. Skin is intact, tube feeding continue @20ml , goal is, 40ml, free of fall, injury and skin breakdown in a shift. POC reviewed with patient and spouse expressed understanding.  Problem: Adult Inpatient Plan of Care  Goal: Plan of Care Review  Outcome: Ongoing, Progressing  Goal: Patient-Specific Goal (Individualized)  Outcome: Ongoing, Progressing  Goal: Absence of Hospital-Acquired Illness or Injury  Outcome: Ongoing, Progressing  Goal: Optimal Comfort and Wellbeing  Outcome: Ongoing, Progressing  Goal: Readiness for Transition of Care  Outcome: Ongoing, Progressing     Problem: Skin Injury Risk Increased  Goal: Skin Health and Integrity  Outcome: Ongoing, Progressing     Problem: Fall Injury Risk  Goal: Absence of Fall and Fall-Related Injury  Outcome: Ongoing, Progressing     Problem: Fluid Imbalance (Pneumonia)  Goal: Fluid Balance  Outcome: Ongoing, Progressing     Problem: Infection (Pneumonia)  Goal: Resolution of Infection Signs and Symptoms  Outcome: Ongoing, Progressing     Problem: Respiratory Compromise (Pneumonia)  Goal: Effective Oxygenation and Ventilation  Outcome: Ongoing, Progressing     Problem: Adjustment to Illness (Acute Coronary Syndrome)  Goal: Optimal Adaptation to Illness  Outcome: Ongoing, Progressing     Problem: Dysrhythmia (Acute Coronary Syndrome)  Goal: Normalized Cardiac Rhythm  Outcome: Ongoing, Progressing     Problem: Cardiac-Related Pain (Acute Coronary Syndrome)  Goal: Absence of Cardiac-Related Pain  Outcome: Ongoing, Progressing     Problem: Hemodynamic Instability (Acute Coronary Syndrome)  Goal: Effective Cardiac Pump Function  Outcome: Ongoing, Progressing     Problem: Tissue Perfusion (Acute Coronary Syndrome)  Goal: Adequate Tissue Perfusion  Outcome: Ongoing, Progressing     Problem: Arrhythmia/Dysrhythmia  (Cardiac Catheterization)  Goal: Stable Heart Rate and Rhythm  Outcome: Ongoing, Progressing     Problem: Bleeding (Cardiac Catheterization)  Goal: Absence of Bleeding  Outcome: Ongoing, Progressing     Problem: Contrast-Induced Injury Risk (Cardiac Catheterization)  Goal: Absence of Contrast-Induced Injury  Outcome: Ongoing, Progressing     Problem: Embolism (Cardiac Catheterization)  Goal: Absence of Embolism Signs and Symptoms  Outcome: Ongoing, Progressing     Problem: Ongoing Anesthesia/Sedation Effects (Cardiac Catheterization)  Goal: Anesthesia/Sedation Recovery  Outcome: Ongoing, Progressing     Problem: Pain (Cardiac Catheterization)  Goal: Acceptable Pain Control  Outcome: Ongoing, Progressing     Problem: Vascular Access Protection (Cardiac Catheterization)  Goal: Absence of Vascular Access Complication  Outcome: Ongoing, Progressing

## 2024-01-11 NOTE — PLAN OF CARE
Recommendations    1. Consider Isosource 1.5 @ gr 50 mL/hr providing 1800 kcal, 82 g protein and 917 mL free water meeing 100% EEN/EPN  2. Continue to monitor for tf tolerance and advancements  3. Monitor weights and labs  4. Collaboration with medical providers    Goals: 1. Pt to meet % EEN/EPN by RD follow up  Nutrition Goal Status: new  Communication of RD Recs:  (POC)    Assessment and Plan    Nutrition Problem  Inadequate oral intake    Related to (etiology):   NPO status    Signs and Symptoms (as evidenced by):   Intake < needs    Interventions/Recommendations (treatment strategy):  Collaboration with medical providers    Nutrition Diagnosis Status:   New

## 2024-01-11 NOTE — CARE UPDATE
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    In the ED, the patient was tachycardic (up to 120), tachypneic, hypotensive and hypoxic, requiring 10L O2.  Labs were remarkable for leukocytosis (20.8), elevated D-dimer (1.78), elevated BNP (2467), elevated troponin (0.393).  VBG showed a pH of 7.24, pCO2 of 76.6 (thought to be erroneous, repeat without BiPAP > PH: 7.34, PCO2: 58.0). Chest x-ray showed abnormal airspace opacification and patchy consolidative changes in the right lower lung zone (aspiration/pneumonia).  CTA chest showed patchy areas of consolidation in the right middle lobe, new from prior.  Patient was given vancomycin, Zosyn, Solu-Medrol 125 mg IV, magnesium sulfate 2 g IV, albuterol x1.  He was admitted for further management.    Prior respiratory cultures grew:   Achromobacter xylosoxidans subsp. Dentrificans  Stenotrophomonas (X.) Maltophilia  Pseudomonas aeruginosa  KLEBSIELLA PNEUMONIAE   PROTEUS MIRABILIS  CITROBACTER KOSERI    Will continue treatment for hospital acquired pneumonia with vancomycin/zosyn. Wean O2 as tolerated

## 2024-01-11 NOTE — ASSESSMENT & PLAN NOTE
Patient's anemia is currently controlled. Has not received any PRBCs to date. Etiology likely d/t  chronic disease  Current CBC reviewed-   Lab Results   Component Value Date    HGB 11.2 (L) 01/11/2024    HCT 36.7 (L) 01/11/2024     Monitor serial CBC and transfuse if patient becomes hemodynamically unstable, symptomatic or H/H drops below 7/21.

## 2024-01-12 LAB
ALBUMIN SERPL BCP-MCNC: 2.7 G/DL (ref 3.5–5.2)
ALP SERPL-CCNC: 76 U/L (ref 55–135)
ALT SERPL W/O P-5'-P-CCNC: 29 U/L (ref 10–44)
ANION GAP SERPL CALC-SCNC: 9 MMOL/L (ref 8–16)
APTT PPP: 128.2 SEC (ref 21–32)
APTT PPP: 32 SEC (ref 21–32)
APTT PPP: 32.4 SEC (ref 21–32)
APTT PPP: 38.4 SEC (ref 21–32)
APTT PPP: 40.1 SEC (ref 21–32)
AST SERPL-CCNC: 55 U/L (ref 10–40)
BASOPHILS # BLD AUTO: 0.02 K/UL (ref 0–0.2)
BASOPHILS NFR BLD: 0.2 % (ref 0–1.9)
BILIRUB SERPL-MCNC: 0.5 MG/DL (ref 0.1–1)
BUN SERPL-MCNC: 25 MG/DL (ref 8–23)
CALCIUM SERPL-MCNC: 8.9 MG/DL (ref 8.7–10.5)
CHLORIDE SERPL-SCNC: 94 MMOL/L (ref 95–110)
CHOLEST SERPL-MCNC: 95 MG/DL (ref 120–199)
CHOLEST/HDLC SERPL: 2.6 {RATIO} (ref 2–5)
CO2 SERPL-SCNC: 31 MMOL/L (ref 23–29)
CREAT SERPL-MCNC: 0.9 MG/DL (ref 0.5–1.4)
DIFFERENTIAL METHOD BLD: ABNORMAL
EOSINOPHIL # BLD AUTO: 0 K/UL (ref 0–0.5)
EOSINOPHIL NFR BLD: 0.1 % (ref 0–8)
ERYTHROCYTE [DISTWIDTH] IN BLOOD BY AUTOMATED COUNT: 15.3 % (ref 11.5–14.5)
EST. GFR  (NO RACE VARIABLE): >60 ML/MIN/1.73 M^2
GLUCOSE SERPL-MCNC: 167 MG/DL (ref 70–110)
HCT VFR BLD AUTO: 30.8 % (ref 40–54)
HDLC SERPL-MCNC: 37 MG/DL (ref 40–75)
HDLC SERPL: 38.9 % (ref 20–50)
HGB BLD-MCNC: 9.2 G/DL (ref 14–18)
IMM GRANULOCYTES # BLD AUTO: 0.06 K/UL (ref 0–0.04)
IMM GRANULOCYTES NFR BLD AUTO: 0.5 % (ref 0–0.5)
LDLC SERPL CALC-MCNC: 46 MG/DL (ref 63–159)
LYMPHOCYTES # BLD AUTO: 0.5 K/UL (ref 1–4.8)
LYMPHOCYTES NFR BLD: 3.8 % (ref 18–48)
MAGNESIUM SERPL-MCNC: 2.1 MG/DL (ref 1.6–2.6)
MCH RBC QN AUTO: 29 PG (ref 27–31)
MCHC RBC AUTO-ENTMCNC: 29.9 G/DL (ref 32–36)
MCV RBC AUTO: 97 FL (ref 82–98)
MONOCYTES # BLD AUTO: 1 K/UL (ref 0.3–1)
MONOCYTES NFR BLD: 7.8 % (ref 4–15)
NEUTROPHILS # BLD AUTO: 10.8 K/UL (ref 1.8–7.7)
NEUTROPHILS NFR BLD: 87.6 % (ref 38–73)
NONHDLC SERPL-MCNC: 58 MG/DL
NRBC BLD-RTO: 0 /100 WBC
PHOSPHATE SERPL-MCNC: 3 MG/DL (ref 2.7–4.5)
PLATELET # BLD AUTO: 177 K/UL (ref 150–450)
PMV BLD AUTO: 10.2 FL (ref 9.2–12.9)
POCT GLUCOSE: 104 MG/DL (ref 70–110)
POCT GLUCOSE: 104 MG/DL (ref 70–110)
POCT GLUCOSE: 117 MG/DL (ref 70–110)
POCT GLUCOSE: 192 MG/DL (ref 70–110)
POTASSIUM SERPL-SCNC: 3.6 MMOL/L (ref 3.5–5.1)
PROT SERPL-MCNC: 6.6 G/DL (ref 6–8.4)
RBC # BLD AUTO: 3.17 M/UL (ref 4.6–6.2)
SODIUM SERPL-SCNC: 134 MMOL/L (ref 136–145)
TRIGL SERPL-MCNC: 60 MG/DL (ref 30–150)
WBC # BLD AUTO: 12.29 K/UL (ref 3.9–12.7)

## 2024-01-12 PROCEDURE — 85730 THROMBOPLASTIN TIME PARTIAL: CPT | Mod: 91 | Performed by: HOSPITALIST

## 2024-01-12 PROCEDURE — 80053 COMPREHEN METABOLIC PANEL: CPT | Performed by: HOSPITALIST

## 2024-01-12 PROCEDURE — 94761 N-INVAS EAR/PLS OXIMETRY MLT: CPT

## 2024-01-12 PROCEDURE — 85025 COMPLETE CBC W/AUTO DIFF WBC: CPT | Performed by: HOSPITALIST

## 2024-01-12 PROCEDURE — 63600175 PHARM REV CODE 636 W HCPCS: Performed by: STUDENT IN AN ORGANIZED HEALTH CARE EDUCATION/TRAINING PROGRAM

## 2024-01-12 PROCEDURE — 25000242 PHARM REV CODE 250 ALT 637 W/ HCPCS: Performed by: STUDENT IN AN ORGANIZED HEALTH CARE EDUCATION/TRAINING PROGRAM

## 2024-01-12 PROCEDURE — 27100171 HC OXYGEN HIGH FLOW UP TO 24 HOURS

## 2024-01-12 PROCEDURE — 84100 ASSAY OF PHOSPHORUS: CPT | Performed by: HOSPITALIST

## 2024-01-12 PROCEDURE — 94640 AIRWAY INHALATION TREATMENT: CPT

## 2024-01-12 PROCEDURE — 83735 ASSAY OF MAGNESIUM: CPT | Performed by: HOSPITALIST

## 2024-01-12 PROCEDURE — 99291 CRITICAL CARE FIRST HOUR: CPT | Mod: ,,, | Performed by: INTERNAL MEDICINE

## 2024-01-12 PROCEDURE — 25000003 PHARM REV CODE 250: Performed by: STUDENT IN AN ORGANIZED HEALTH CARE EDUCATION/TRAINING PROGRAM

## 2024-01-12 PROCEDURE — 36415 COLL VENOUS BLD VENIPUNCTURE: CPT | Performed by: HOSPITALIST

## 2024-01-12 PROCEDURE — 99900026 HC AIRWAY MAINTENANCE (STAT)

## 2024-01-12 PROCEDURE — 21400001 HC TELEMETRY ROOM

## 2024-01-12 PROCEDURE — 25000003 PHARM REV CODE 250: Performed by: HOSPITALIST

## 2024-01-12 PROCEDURE — 80061 LIPID PANEL: CPT | Performed by: HOSPITALIST

## 2024-01-12 PROCEDURE — 63600175 PHARM REV CODE 636 W HCPCS: Performed by: HOSPITALIST

## 2024-01-12 PROCEDURE — 27000221 HC OXYGEN, UP TO 24 HOURS

## 2024-01-12 PROCEDURE — 99900035 HC TECH TIME PER 15 MIN (STAT)

## 2024-01-12 RX ORDER — FUROSEMIDE 20 MG/1
20 TABLET ORAL DAILY
Status: DISCONTINUED | OUTPATIENT
Start: 2024-01-12 | End: 2024-01-20

## 2024-01-12 RX ADMIN — SODIUM CHLORIDE SOLN NEBU 3% 4 ML: 3 NEBU SOLN at 08:01

## 2024-01-12 RX ADMIN — MUPIROCIN: 20 OINTMENT TOPICAL at 08:01

## 2024-01-12 RX ADMIN — IPRATROPIUM BROMIDE AND ALBUTEROL SULFATE 3 ML: 2.5; .5 SOLUTION RESPIRATORY (INHALATION) at 12:01

## 2024-01-12 RX ADMIN — MELATONIN TAB 3 MG 6 MG: 3 TAB at 08:01

## 2024-01-12 RX ADMIN — CLOPIDOGREL BISULFATE 75 MG: 75 TABLET ORAL at 08:01

## 2024-01-12 RX ADMIN — ASPIRIN 81 MG CHEWABLE TABLET 81 MG: 81 TABLET CHEWABLE at 08:01

## 2024-01-12 RX ADMIN — FLUOXETINE HYDROCHLORIDE 20 MG: 20 SOLUTION ORAL at 08:01

## 2024-01-12 RX ADMIN — SODIUM CHLORIDE SOLN NEBU 3% 4 ML: 3 NEBU SOLN at 09:01

## 2024-01-12 RX ADMIN — FAMOTIDINE 20 MG: 10 INJECTION INTRAVENOUS at 08:01

## 2024-01-12 RX ADMIN — PIPERACILLIN AND TAZOBACTAM 4.5 G: 4; .5 INJECTION, POWDER, LYOPHILIZED, FOR SOLUTION INTRAVENOUS; PARENTERAL at 08:01

## 2024-01-12 RX ADMIN — ATORVASTATIN CALCIUM 40 MG: 40 TABLET, FILM COATED ORAL at 08:01

## 2024-01-12 RX ADMIN — PIPERACILLIN AND TAZOBACTAM 4.5 G: 4; .5 INJECTION, POWDER, LYOPHILIZED, FOR SOLUTION INTRAVENOUS; PARENTERAL at 01:01

## 2024-01-12 RX ADMIN — FUROSEMIDE 20 MG: 20 TABLET ORAL at 02:01

## 2024-01-12 RX ADMIN — FUROSEMIDE 40 MG: 10 INJECTION, SOLUTION INTRAVENOUS at 08:01

## 2024-01-12 RX ADMIN — PIPERACILLIN AND TAZOBACTAM 4.5 G: 4; .5 INJECTION, POWDER, LYOPHILIZED, FOR SOLUTION INTRAVENOUS; PARENTERAL at 04:01

## 2024-01-12 RX ADMIN — IPRATROPIUM BROMIDE AND ALBUTEROL SULFATE 3 ML: 2.5; .5 SOLUTION RESPIRATORY (INHALATION) at 08:01

## 2024-01-12 RX ADMIN — VANCOMYCIN HYDROCHLORIDE 1750 MG: 500 INJECTION, POWDER, LYOPHILIZED, FOR SOLUTION INTRAVENOUS at 04:01

## 2024-01-12 RX ADMIN — IPRATROPIUM BROMIDE AND ALBUTEROL SULFATE 3 ML: 2.5; .5 SOLUTION RESPIRATORY (INHALATION) at 03:01

## 2024-01-12 RX ADMIN — HEPARIN SODIUM 16 UNITS/KG/HR: 10000 INJECTION, SOLUTION INTRAVENOUS at 06:01

## 2024-01-12 RX ADMIN — IPRATROPIUM BROMIDE AND ALBUTEROL SULFATE 3 ML: 2.5; .5 SOLUTION RESPIRATORY (INHALATION) at 09:01

## 2024-01-12 RX ADMIN — IPRATROPIUM BROMIDE AND ALBUTEROL SULFATE 3 ML: 2.5; .5 SOLUTION RESPIRATORY (INHALATION) at 04:01

## 2024-01-12 RX ADMIN — FOLIC ACID 1 MG: 1 TABLET ORAL at 08:01

## 2024-01-12 NOTE — ASSESSMENT & PLAN NOTE
Patient noted to have a percutaneous endoscopic gastrostomy tube in place. I have personally inspected the tube.Tube was placed prior to this admission There are no signs of drainage or infection around the site. The tube is patent. Medications have converted to liquid form if available.  Routine care to be done by wound care and nursing staff.   - resumed tube feeds

## 2024-01-12 NOTE — ASSESSMENT & PLAN NOTE
Nutrition consulted. Most recent weight and BMI monitored-     Measurements:  Wt Readings from Last 1 Encounters:   01/11/24 59.9 kg (132 lb)   Body mass index is 19.49 kg/m².    Patient has been screened and assessed by RD.    Interventions/Recommendations (treatment strategy):     - continue tube feeds

## 2024-01-12 NOTE — ASSESSMENT & PLAN NOTE
ALTHOUGH PATIENT'S TROPONINS ELEVATED, HE IS CHEST PAIN-FREE.  STATES THAT PRIOR TO HIS STENTS IN [place] HE HAD CHEST PAINS AND CURRENTLY IS CHEST PAIN-FREE.  IT IS POSSIBLE THAT HIS TROPONIN COULD BE ELEVATED SECONDARY TO HIS HYPOXEMIA ON PRESENTATION.  HOWEVER DOES HAVE LATERAL ST DEPRESSIONS AND ISCHEMIC LOOKING EKG.    1/12 has continued to be chest pain-free.

## 2024-01-12 NOTE — ASSESSMENT & PLAN NOTE
RML infiltrate  Trach aspirate with suspect Pseudomonas  - continue vanc, zosyn   - likely DC vanc tomorrow if culture no Staph

## 2024-01-12 NOTE — HOSPITAL COURSE
Mr Fareed Richard Jr. is a 73 y.o. man who was admitted with acute on chronic hypoxic respiratory failure due to pneumonia, suspect recurrent Pseudomonas. Also with NSTEMI on admit in setting of known CAD. Started vanc, zosyn. Weaned O2. Cardiology consulted- due to severe CAD and complications with last angiogram <1 year ago, will plan medical management with heparin gtt, asa, plavix, and will NOT pursue ischemic evaluation. He improved and was stepped down to floor. However, he then developed Afib RVR. He moved to ICU on amiodarone gtt. His respiratory status worsened. He was transitioned to comfort measures but continued antibiotics and neb treatments. He continues to have Pseudomonas in trach aspirate, now resistant to zosyn and cefepime.     Despite this, he is more awake and alert on 1/23/24. His wife is wondering if he is appropriate for hospice care vs trying to get better and discharge to home. She acknowledges that he was very dyspneic with minimal exertion at home- e.g. out of breath when standing to turn on/off a light switch. He is appropriate for hospice care if this is in his goals of care. Setting up informational meeting with [facility] per wife request. In meantime, changed antibiotics to meropenem to appropriately treat his persistent Pseudomonas.     Ms Richard met with [facility]. Mr Richard has deteriorated in terms of his health. Plan now is inpatient hospice. He was discharged to inpatient [facility].

## 2024-01-12 NOTE — PROGRESS NOTES
[facility]  Cardiology  Progress Note    Patient Name: Fareed Richard Jr.  MRN: [id number]  Admission Date: 1/11/2024  Hospital Length of Stay: 1 days  Code Status: Full Code   Attending Physician: Caprice Nava MD   Primary Care Physician: Kodi Tubbs MD  Expected Discharge Date: 1/16/2024  Principal Problem:Acute on chronic respiratory failure with hypoxia    Subjective:       Interval History:  No chest pains.    Past Medical History:   Diagnosis Date    Cancer     skin to Rt forearm and face    COPD (chronic obstructive pulmonary disease)     Hyperlipidemia     Hypertension     Pseudoaneurysm        Past Surgical History:   Procedure Laterality Date    ABDOMINAL SURGERY      stents placed in liver and large intestines, per patient    ANGIOGRAPHY OF AORTIC ARCH  3/27/2023    Procedure: Aortogram, Aortic Arch;  Surgeon: Tim Bhatt MD;  Location: [facility] CATH LAB;  Service: Cardiology;;    AORTOGRAPHY WITH SERIALOGRAPHY N/A 3/27/2023    Procedure: AORTOGRAM, WITH SERIALOGRAPHY;  Surgeon: Tim Bhatt MD;  Location: [facility] CATH LAB;  Service: Cardiology;  Laterality: N/A;    CAROTID STENT      COLONOSCOPY N/A 09/27/2022    Procedure: COLONOSCOPY;  Surgeon: Donnie Peterson MD;  [facility]  Service: Endoscopy;  Laterality: N/A;    COLONOSCOPY N/A 09/30/2022    Procedure: COLONOSCOPY;  Surgeon: Joy Cabrera MD;  Location: [facility] ENDO (2ND FLR);  Service: Endoscopy;  Laterality: N/A;    CORONARY STENT PLACEMENT  01/2000    DISSECTION OF NECK Bilateral 01/04/2022    Procedure: DISSECTION, NECK;  Surgeon: Naeem Smalls MD;  Location: [facility] OR 2ND FLR;  Service: ENT;  Laterality: Bilateral;    ESOPHAGOGASTRODUODENOSCOPY N/A 09/27/2022    Procedure: EGD (ESOPHAGOGASTRODUODENOSCOPY);  Surgeon: Donnie Peterson MD;  Location: [facility] ENDO (2ND FLR);  Service: Endoscopy;  Laterality: N/A;    ESOPHAGOGASTRODUODENOSCOPY N/A 09/30/2022    Procedure: EGD  (ESOPHAGOGASTRODUODENOSCOPY);  Surgeon: Joy Cabrera MD;  Location: [facility] ENDO (2ND FLR);  Service: Endoscopy;  Laterality: N/A;    ESOPHAGOGASTRODUODENOSCOPY W/ PEG N/A 01/04/2022    Procedure: EGD, WITH PEG TUBE INSERTION;  Surgeon: Anthony Calabrese MD;  [facility];  Service: General;  Laterality: N/A;    EYE SURGERY      Cataract bilateral    femoral stents      bilateral    FLAP PROCEDURE N/A 01/03/2022    Procedure: CREATION, FREE FLAP;  Surgeon: Naeem Smalls MD;  [facility];  Service: ENT;  Laterality: N/A;    FLAP PROCEDURE Right 01/04/2022    Procedure: CREATION, FREE FLAP;  Surgeon: Stacey Conde MD;  [facility];  Service: ENT;  Laterality: Right;  Ischemic start 1203  Ischemic stop 1353    GLOSSECTOMY N/A 01/04/2022    Procedure: GLOSSECTOMY;  Surgeon: Naeem Smalls MD;  Location: [facility] OR [facility] FLR;  Service: ENT;  Laterality: N/A;    LEFT HEART CATHETERIZATION Left 3/23/2023    Procedure: Left heart cath;  Surgeon: Tacos Benitez MD;  Location: [facility] CATH LAB;  Service: Cardiology;  Laterality: Left;    PERCUTANEOUS TRANSLUMINAL BALLOON ANGIOPLASTY OF CORONARY ARTERY Left 3/27/2023    Procedure: Angioplasty-coronary;  Surgeon: Tim Bhatt MD;  Location: [facility] CATH LAB;  Service: Cardiology;  Laterality: Left;  not before 9am, R rad access, 6 Fr EBU 3.5 guide    RADICAL NECK DISSECTION Left 01/03/2022    Procedure: DISSECTION, NECK, RADICAL;  Surgeon: Naeem Smalls MD;  [facility];  Service: ENT;  Laterality: Left;    SKIN BIOPSY      SKIN CANCER EXCISION      STENT, CORONARY, MULTI VESSEL  3/27/2023    Procedure: Stent, Coronary, Multi Vessel;  Surgeon: Tim Bhatt MD;  Location: [facility] CATH LAB;  Service: Cardiology;;    TRACHEOTOMY N/A 01/04/2022    Procedure: TRACHEOTOMY;  Surgeon: Naeem Smalls MD;  [facility];  Service: ENT;  Laterality: N/A;    VASCULAR SURGERY         Review of  patient's allergies indicates:   Allergen Reactions    Ativan [lorazepam] Anxiety       No current facility-administered medications on file prior to encounter.     Current Outpatient Medications on File Prior to Encounter   Medication Sig    albuterol-ipratropium (DUO-NEB) 2.5 mg-0.5 mg/3 mL nebulizer solution Take 3 mLs by nebulization every 4 (four) hours as needed for Wheezing. Rescue    ascorbic acid, vitamin C, (VITAMIN C) 100 MG tablet Take 100 mg by mouth once daily.    aspirin 81 MG Chew Take 1 tablet (81 mg total) by mouth once daily.    atorvastatin (LIPITOR) 40 MG tablet 1 tablet (40 mg total) by Per G Tube route every evening. (Patient taking differently: 40 mg by Per G Tube route once daily.)    bisacodyL (DULCOLAX) 10 mg Supp Place 1 suppository (10 mg total) rectally daily as needed.    clopidogreL (PLAVIX) 75 mg tablet Take 1 tablet (75 mg total) by mouth once daily. (Patient taking differently: Take 75 mg by mouth nightly.)    ferrous sulfate 325 (65 FE) MG EC tablet Take 325 mg by mouth 3 (three) times daily with meals.    FLUoxetine (PROZAC) 20 mg/5 mL (4 mg/mL) solution Take 20 mg by mouth once daily.    folic acid (FOLVITE) 1 MG tablet Take 1 mg by mouth once daily.    furosemide (LASIX) 40 MG tablet Take 0.5 tablets (20 mg total) by mouth once daily. (Patient taking differently: Take 20 mg by mouth nightly.)    glycopyrrolate (CUVPOSA) 1 mg/5 mL (0.2 mg/mL) Soln Take 5 mLs (1 mg total) by mouth 3 (three) times daily as needed (TO REDUCE SECRETIONS).    hydrOXYzine HCL (ATARAX) 25 MG tablet SMARTSI.5 Tablet(s) Gastro Tube Every 8 Hours PRN    melatonin (MELATIN) 3 mg tablet 1 tablet (3 mg total) by Per G Tube route nightly.    metoprolol tartrate (LOPRESSOR) 25 MG tablet Take 0.5 tablets (12.5 mg total) by mouth 2 (two) times daily.    omeprazole (PRILOSEC) 40 MG capsule Take 40 mg (contents of one capsule) twice daily through PEG tube. Mix contents of capsule with 10 mL applesauce. (Patient  taking differently: 40 mg 2 (two) times a day. Take 40 mg (contents of one capsule) twice daily through PEG tube. Mix contents of capsule with 10 mL applesauce.)    polyethylene glycol (GLYCOLAX) 17 gram PwPk 17 g by Per G Tube route 2 (two) times daily.    sodium chloride 3% 3 % nebulizer solution Take 4 mLs by nebulization 2 (two) times a day.    thiamine (VITAMIN B-1) 50 MG tablet Take 50 mg by mouth once daily.    WIXELA INHUB 250-50 mcg/dose diskus inhaler SMARTSI Puff(s) By Mouth Every 12 Hours    [DISCONTINUED] losartan (COZAAR) 50 MG tablet 1 tablet (50 mg total) by Per G Tube route once daily.     Family History    None       Tobacco Use    Smoking status: Former     Current packs/day: 0.00     Average packs/day: 2.0 packs/day for 55.0 years (110.0 ttl pk-yrs)     Types: Cigarettes     Start date: 1963     Quit date: 2018     Years since quittin.7    Smokeless tobacco: Never    Tobacco comments:     3/3 ppd x 40 yrs. Currently 3-4 cigarettes daily .He is trying  to quit. Is using a Vapor cigarettes  2-3 x's a day.   Substance and Sexual Activity    Alcohol use: Not Currently    Drug use: No    Sexual activity: Not Currently     Review of Systems   Constitutional:  Positive for fatigue and fever.   HENT:  Negative for congestion.    Eyes:  Negative for discharge.   Respiratory:  Positive for cough and shortness of breath. Negative for chest tightness.    Cardiovascular:  Negative for chest pain and leg swelling.   Gastrointestinal:  Negative for abdominal pain.   Endocrine: Negative for polyphagia.   Genitourinary:  Negative for difficulty urinating.   Musculoskeletal:  Positive for arthralgias.   Skin:  Negative for color change.   Allergic/Immunologic: Negative for food allergies.   Neurological:  Negative for headaches.   Psychiatric/Behavioral:  Negative for confusion.      Objective:     Vital Signs (Most Recent):  Temp: 98.2 °F (36.8 °C) (24 1501)  Pulse: 106 (24  1623)  Resp: (!) 24 (01/12/24 1630)  BP: 111/72 (01/12/24 1600)  SpO2: 97 % (01/12/24 1623) Vital Signs (24h Range):  Temp:  [96.7 °F (35.9 °C)-98.3 °F (36.8 °C)] 98.2 °F (36.8 °C)  Pulse:  [] 106  Resp:  [18-39] 24  SpO2:  [95 %-100 %] 97 %  BP: ()/(68-87) 111/72     Weight: 59.9 kg (132 lb)  Body mass index is 19.49 kg/m².     Physical Exam  Vitals and nursing note reviewed.   Constitutional:       General: He is not in acute distress.     Appearance: He is ill-appearing (chronically). He is not toxic-appearing.   HENT:      Head: Normocephalic and atraumatic.      Mouth/Throat:      Mouth: Mucous membranes are moist.   Cardiovascular:      Rate and Rhythm: Normal rate and regular rhythm.   Pulmonary:      Effort: Pulmonary effort is normal.      Breath sounds: Normal breath sounds. No wheezing, rhonchi or rales.      Comments: 10L trach collar  Abdominal:      General: Bowel sounds are normal. There is no distension.      Palpations: Abdomen is soft. There is no mass.      Tenderness: There is no abdominal tenderness. There is no guarding.      Comments: PEG in place   Musculoskeletal:      Right lower leg: No edema.      Left lower leg: No edema.   Skin:     General: Skin is warm and dry.   Neurological:      Mental Status: He is alert. Mental status is at baseline.                  DATA:     Laboratory:  CBC:  Recent Labs   Lab 12/01/23  0433 01/11/24  0021 01/12/24  0635   WBC 11.91 20.83 H 12.29   Hemoglobin 11.7 L 11.2 L 9.2 L   Hematocrit 39.2 L 36.7 L 30.8 L   Platelets 168 244 177         CHEMISTRIES:  Recent Labs   Lab 05/12/22  0343 05/14/22  0724 05/17/22  1007 08/24/22  1000 10/24/23  0414 10/26/23  0537 12/01/23  0433 01/11/24  0021 01/12/24  0635   Glucose 110 96 162 H   < > 184 H   < > 169 H 206 H 167 H   Sodium 135 L 136 133 L   < > 140   < > 153 H 139 134 L   Potassium 4.4 4.1 5.0   < > 3.6   < > 3.4 L 4.9 3.6   BUN 24 H 18 15   < > 42 H   < > 39 H 26 H 25 H   Creatinine 0.7 0.7 0.7    < > 1.1   < > 1.1 0.9 0.9   eGFR if  >60.0 >60 >60.0  --   --   --   --   --   --    eGFR if non African American >60.0 >60 >60.0  --   --   --   --   --   --    Calcium 9.1 9.6 9.5   < > 9.1   < > 10.0 9.5 8.9   Magnesium 2.0  --  2.0   < > 2.0  --   --  2.1 2.1    < > = values in this interval not displayed.         CARDIAC BIOMARKERS:  Recent Labs   Lab 01/11/24  0021 01/11/24  0311 01/11/24  0855   Troponin I 0.393 H 1.011 H 3.344 H         COAGS:  Recent Labs   Lab 04/02/23  0322 11/26/23  0505 01/11/24  0855   INR 1.0 1.1 1.1         LIPIDS/LFTS:  Recent Labs   Lab 08/29/23  1522 10/20/23  0815 11/22/23  0422 01/11/24  0021 01/12/24  0635   Cholesterol 85 L  --   --   --  95 L   Triglycerides 68  --   --   --  60   HDL 25 L  --   --   --  37 L   LDL Cholesterol 46.4 L  --   --   --  46.0 L   Non-HDL Cholesterol 60  --   --   --  58   AST 20   < > 19 24 55 H   ALT 28   < > 19 18 29    < > = values in this interval not displayed.         Hemoglobin A1C   Date Value Ref Range Status   08/29/2023 5.8 (H) 4.0 - 5.6 % Final     Comment:     ADA Screening Guidelines:  5.7-6.4%  Consistent with prediabetes  >or=6.5%  Consistent with diabetes    High levels of fetal hemoglobin interfere with the HbA1C  assay. Heterozygous hemoglobin variants (HbS, HgC, etc)do  not significantly interfere with this assay.   However, presence of multiple variants may affect accuracy.     09/27/2022 6.3 (H) 4.0 - 5.6 % Final     Comment:     ADA Screening Guidelines:  5.7-6.4%  Consistent with prediabetes  >or=6.5%  Consistent with diabetes    High levels of fetal hemoglobin interfere with the HbA1C  assay. Heterozygous hemoglobin variants (HbS, HgC, etc)do  not significantly interfere with this assay.   However, presence of multiple variants may affect accuracy.         TSH  Recent Labs   Lab 05/23/23  0849 07/10/23  1040 08/29/23  1522   TSH 4.379 H 3.610 4.460 H         The ASCVD Risk score (Caitlyn DK, et al., 2019)  failed to calculate for the following reasons:    The patient has a prior MI or stroke diagnosis       BNP    Lab Results   Component Value Date/Time    BNP 2,467 (H) 01/11/2024 12:21 AM    BNP 1,708 (H) 11/20/2023 04:06 PM     (H) 10/20/2023 08:15 AM    BNP 1,185 (H) 04/18/2023 07:07 AM    BNP 2,543 (H) 03/21/2023 03:25 PM     (H) 03/18/2023 11:06 AM     (H) 09/23/2022 12:47 PM     (H) 09/14/2022 04:59 AM    BNP 98 05/14/2022 07:25 AM     (H) 04/22/2022 05:04 PM    BNP 1,607 (H) 04/19/2022 09:29 AM     (H) 03/21/2022 02:39 PM     (H) 11/02/2019 02:52 AM     (H) 12/27/2018 01:29 PM            ECHO    Results for orders placed during the hospital encounter of 01/11/24    Echo    Interpretation Summary    Left Ventricle: The left ventricle is normal in size. Mildly increased wall thickness. There is concentric hypertrophy. Normal wall motion. There is low normal systolic function with a visually estimated ejection fraction of 50 - 55%. Unable to assess diastolic function due to E-A fusion.    Right Ventricle: Normal right ventricular cavity size. Systolic function is normal.    Left Atrium: Left atrium is moderately dilated.    Right Atrium: Right atrium is mildly dilated.    Mitral Valve: There is mild regurgitation.    Tricuspid Valve: There is mild regurgitation.    Pulmonary Artery: The estimated pulmonary artery systolic pressure is 13 mmHg.    IVC/SVC: Normal venous pressure at 3 mmHg.      STRESS TEST    No results found for this or any previous visit.        CATH    Results for orders placed during the hospital encounter of 03/18/23    Cardiac catheterization    Conclusion  Description of the findings of the procedure: uneventful LHC/cor angio/Ao angio/iliac angio/PCI prox LAD BRADLEY x1, PCI mid LCx BRADLEY x1/R rad vasband/RFA man comp.    Findings/Key Components:  LVEDP: 3mmHg  LVEF: 60%    Aortic arch: severe calcific atherosclerosis  Ost innominate artery  80%  Prox R SCA 90%, 53mmHg gradient across stenoses.  Prox-mid ICA patent  Distal aortoiliac bifurcation with patent stents in ЮЛИЯ bilaterally and possible severe ISR in R ЮЛИЯ.    Severe diffuse cor Ca++  Dominance: Right  LM: MLI  LAD: prox 95% at D1, mid  Ramus: MLI  LCx: mid 90%  RCA: not injected, diffuse disease, severe dist RCA stenosis (see Dr. Benitez cath report    PCI prox LAD:  Preop ASA/Plavix, intraop heparin  Wired LAD/D1  Predil 2.5x12, 2.75x15 NC  Stent 3.0x24 Synergy BRADLEY  Post IVUS with mild underexpansion mid stent  Postdil 3.25x20 NC at 22atm  Excellent angiographic result, T3 flow, 0% residual stenosis    PCI mid LCx  Predil 2.5x12  Stent 2.75x20 Synergy BRADLEY 14 fidelia  Unavble to pass IVUS due to severe prox LCx tortuosity  Excellent angiographic result, T3 flow, 0% residual stenosis    Hemostasis:  R Radial band  RFA man comp    Assessment and Plan:     Brief HPI:     * Acute on chronic respiratory failure with hypoxia        Pneumonia        NSTEMI (non-ST elevated myocardial infarction)  TYPE 1 VERSUS TYPE 2.  PATIENT IS CHEST PAIN-FREE.  STATES THAT PRIOR TO HIS PCI WHEN HE HAD THE NON-STEMI LAST TIME, HE DID HAVE CHEST PAINS.  EKG DOES SHOW ST DEPRESSIONS.  CASE DISCUSSED IN DETAIL WITH THE DR. TAMAYO WHO IS THE PATIENT'S PRIMARY CARDIOLOGIST.  LAST PCI WAS COMPLICATED BY PULMONARY AND RETROPERITONEAL HEMORRHAGE.  WHEN THE PATIENT SAW DR. TAMAYO IN CLINIC AS AN OUTPATIENT, DR. TAMAYO HAD RECOMMENDED PALLIATIVE CARE CONSULT.  TODAY WITH ME AND DR. TAMAYO HAD A DETAILED DISCUSSION AND CONSIDERING HIS POOR STATE OF HEALTH, MULTIPLE COMORBIDITIES, THE FACT THAT HE IS CHEST PAIN-FREE AND THE FACT THAT HE HAD MULTIPLE COMPLICATIONS CHRONIC PULMONARY HEMORRHAGE AS WELL AS RETROPERITONEAL HEMORRHAGE AFTER HIS LAST CATHETERIZATION AND PCI BY DR. WEST, WE RECOMMEND MEDICAL MANAGEMENT AND PALLIATIVE CARE CONSULT AT THE CURRENT TIME.  HE IS CURRENTLY COMFORTABLE AND CHEST PAIN-FREE.  CONTINUE  MEDICAL MANAGEMENT.  IF DEVELOPS LIFESTYLE LIMITING ANGINA, THEN DISCUSSION OF HIGH-RISK PCI WILL BE HELD.    January 12th:  Continues to be chest pain-free.  Continue medical management    PEG (percutaneous endoscopic gastrostomy) status        Tracheostomy present        Severe protein-calorie malnutrition          Other hyperlipidemia        Coronary artery disease involving native coronary artery of native heart without angina pectoris  ALTHOUGH PATIENT'S TROPONINS ELEVATED, HE IS CHEST PAIN-FREE.  STATES THAT PRIOR TO HIS STENTS IN MARCH HE HAD CHEST PAINS AND CURRENTLY IS CHEST PAIN-FREE.  IT IS POSSIBLE THAT HIS TROPONIN COULD BE ELEVATED SECONDARY TO HIS HYPOXEMIA ON PRESENTATION.  HOWEVER DOES HAVE LATERAL ST DEPRESSIONS AND ISCHEMIC LOOKING EKG.    1/12 has continued to be chest pain-free.    Peripheral vascular disease              VTE Risk Mitigation (From admission, onward)           Ordered     IP VTE LOW RISK PATIENT  Once         01/11/24 1648     heparin 25,000 units in dextrose 5% (100 units/ml) IV bolus from bag - ADDITIONAL PRN BOLUS - 60 units/kg (max bolus 4000 units)  As needed (PRN)        Question:  Heparin Infusion Adjustment (DO NOT MODIFY ANSWER)  Answer:  \\[email or web address]\epic\Images\Pharmacy\HeparinInfusions\heparin LOW INTENSITY nomogram for [facility] [id number].pdf    01/11/24 0823     heparin 25,000 units in dextrose 5% (100 units/ml) IV bolus from bag - ADDITIONAL PRN BOLUS - 30 units/kg (max bolus 4000 units)  As needed (PRN)        Question:  Heparin Infusion Adjustment (DO NOT MODIFY ANSWER)  Answer:  \\[email or web address]\epic\Images\Pharmacy\HeparinInfusions\heparin LOW INTENSITY nomogram for [facility] [id number].pdf    01/11/24 0823     heparin 25,000 units in dextrose 5% 250 mL (100 units/mL) infusion LOW INTENSITY nomogram - [facility]  Continuous        Question:  Begin at (units/kg/hr)  Answer:  12    01/11/24 0823     Place sequential compression device  Until discontinued         01/11/24 [id number]                     Arturo Malone MD  Cardiology  [facility] - Intensive Care    Critical Care Time:  35 minutes     Critical care was time spent personally by me on the following activities: development of treatment plan with patient or surrogate and bedside caregivers, discussions with consultants, evaluation of patient's response to treatment, examination of patient, ordering and performing treatments and interventions, ordering and review of laboratory studies, ordering and review of radiographic studies, pulse oximetry, re-evaluation of patient's condition. This critical care time did not overlap with that of any other provider or involve time for any procedures.

## 2024-01-12 NOTE — ASSESSMENT & PLAN NOTE
TYPE 1 VERSUS TYPE 2.  PATIENT IS CHEST PAIN-FREE.  STATES THAT PRIOR TO HIS PCI WHEN HE HAD THE NON-STEMI LAST TIME, HE DID HAVE CHEST PAINS.  EKG DOES SHOW ST DEPRESSIONS.  CASE DISCUSSED IN DETAIL WITH THE DR. TAMAYO WHO IS THE PATIENT'S PRIMARY CARDIOLOGIST.  LAST PCI WAS COMPLICATED BY PULMONARY AND RETROPERITONEAL HEMORRHAGE.  WHEN THE PATIENT SAW DR. TAMAYO IN CLINIC AS AN OUTPATIENT, DR. TAMAYO HAD RECOMMENDED PALLIATIVE CARE CONSULT.  TODAY WITH ME AND DR. TAMAYO HAD A DETAILED DISCUSSION AND CONSIDERING HIS POOR STATE OF HEALTH, MULTIPLE COMORBIDITIES, THE FACT THAT HE IS CHEST PAIN-FREE AND THE FACT THAT HE HAD MULTIPLE COMPLICATIONS CHRONIC PULMONARY HEMORRHAGE AS WELL AS RETROPERITONEAL HEMORRHAGE AFTER HIS LAST CATHETERIZATION AND PCI BY DR. WEST, WE RECOMMEND MEDICAL MANAGEMENT AND PALLIATIVE CARE CONSULT AT THE CURRENT TIME.  HE IS CURRENTLY COMFORTABLE AND CHEST PAIN-FREE.  CONTINUE MEDICAL MANAGEMENT.  IF DEVELOPS LIFESTYLE LIMITING ANGINA, THEN DISCUSSION OF HIGH-RISK PCI WILL BE HELD.    January 12th:  Continues to be chest pain-free.  Continue medical management

## 2024-01-12 NOTE — PLAN OF CARE
Pt remains in ICU. AAOX4. Trach collar. Pt states he does not do continuous feeds at home. Turned off tube feeds per pt request due to abdominal fullness and discomfort. Okay per MD Jimenez. VSS. No acute events overnight.     Problem: Adult Inpatient Plan of Care  Goal: Plan of Care Review  Outcome: Ongoing, Progressing  Goal: Patient-Specific Goal (Individualized)  Outcome: Ongoing, Progressing  Goal: Absence of Hospital-Acquired Illness or Injury  Outcome: Ongoing, Progressing  Goal: Optimal Comfort and Wellbeing  Outcome: Ongoing, Progressing  Goal: Readiness for Transition of Care  Outcome: Ongoing, Progressing

## 2024-01-12 NOTE — PROGRESS NOTES
Vancomycin consult follow-up:    Patient reviewed, renal function stable, no new levels, continue current therapy; Next levels due: trough due 1/13/2024 at 0230

## 2024-01-12 NOTE — ASSESSMENT & PLAN NOTE
Patient admitted with shortness of breath. His BNP is higher than ever before despite him appearing euvolemic.      BNP  Recent Labs   Lab 01/11/24  0021   BNP 2,467*       CT RML infiltrate  Recent Labs   Lab 01/11/24  0855   TROPONINI 3.344*       EKG not able to see in MUSE- will need to find. Per Cardiology, no STEMI    Continue 40mg IV lasix QD. Monitor UOP. Likely change to PO lasix tomorrow  TTE normal EF, potential diastolic dysfunction  Cardiology consulted for NSTEMI- no ischemic eval planned

## 2024-01-12 NOTE — ASSESSMENT & PLAN NOTE
Patient admitted with Hypoxic which is Acute on Chronic.  he is on home oxygen at 6 LPM trach collar. Signs/symptoms of respiratory failure include- increased work of breathing and respiratory distress present on admission.   - Labs and images were reviewed. Patient Has not has a recent ABG.   - Supplemental oxygen was provided and noted-  trach collar    - Respiratory failure is due to- CHF and Pneumonia   - CT with RML PNA. Trach aspirate suspected Pseudomonas. Continue vanc, zosyn for now- likely can drop vanc tomorrow   - BNP higher than ever before. TTE normal EF. NSTEMI present. Continue lasix 40mg IV daily- can likely change to PO tomorrow  - CTA no PE  - improving from admit

## 2024-01-12 NOTE — CONSULTS
[facility] - Intensive Care  Cardiology  Consult Note    Patient Name: Fareed Richard Jr.  MRN: [id number]  Admission Date: 1/11/2024  Hospital Length of Stay: 0 days  Code Status: Full Code   Attending Provider: patient and ER records  Consulting Provider: Arturo Malone MD  Primary Care Physician: Kodi Tubbs MD  Principal Problem:Acute on chronic respiratory failure with hypoxia    Patient information was obtained from patient and ER records.     Inpatient consult to Cardiology  Consult performed by: Arturo Malone MD  Consult ordered by: Caprice Nava MD        Subjective:     Chief Complaint:  SOB, SERRANO, TROP ELEVATION     HPI:   PATIENT IS A PLEASANT 73-YEAR-OLD MAN.  PATIENT OF DR. TAMAYO.  CAME IN WITH HYPOXEMIA.  MULTIPLE MEDICAL ISSUES AS LISTED BELOW INCLUDING TRACH PEG, CAD COPD PND DYSLIPIDEMIA.  RECENTLY UNDERWENT PCI BY DR. CARMONA IN FOR NON-STEMI IN MARCH OF 2023.  AT THAT TIME HAD PCI OF CIRCUMFLEX AS WELL AS LAD.  NOW COMES IN WITH ELEVATED TROPONIN AND HYPOXEMIA.  DENIES CHEST PAINS.    Recent Labs   Lab 01/11/24  0855   TROPONINI 3.344*             HPI: Mr Fareed Richard Jr. 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 He is on 6L trach collar at home and receives tube feeding. He had shortness of breath, fever, and increased trach output. Denies chest pain.      In ED, he was tachycardic and tachypneic with SpO2 85% on 8L. He was given albuterol, vanc/zosyn, solumedrol. He was admitted to ICU.                 CAD      3/27/23 NSTEMI s/p PCI: Prox LAD 3.0x24 Synergy BRADLEY, Mid LCx 2.75x20 Synergy BRADLEY     Severe PAD with severe innominate and prox R SCA stenoses and R ЮЛИЯ ISR (cath 3/2023)     Ao root dil, 4.2 cm (echo 3/2023)    Past Medical History:   Diagnosis Date    Cancer     skin to Rt forearm and face    COPD (chronic obstructive pulmonary  disease)     Hyperlipidemia     Hypertension     Pseudoaneurysm        Past Surgical History:   Procedure Laterality Date    ABDOMINAL SURGERY      stents placed in liver and large intestines, per patient    ANGIOGRAPHY OF AORTIC ARCH  3/27/2023    Procedure: Aortogram, Aortic Arch;  Surgeon: Tim Bhatt MD;  Location: [facility] CATH LAB;  Service: Cardiology;;    AORTOGRAPHY WITH SERIALOGRAPHY N/A 3/27/2023    Procedure: AORTOGRAM, WITH SERIALOGRAPHY;  Surgeon: Tim Bhatt MD;  Location: [facility] CATH LAB;  Service: Cardiology;  Laterality: N/A;    CAROTID STENT      COLONOSCOPY N/A 09/27/2022    Procedure: COLONOSCOPY;  Surgeon: Donnie Peterson MD;  Location: [facility] ENDO (2ND FLR);  Service: Endoscopy;  Laterality: N/A;    COLONOSCOPY N/A 09/30/2022    Procedure: COLONOSCOPY;  Surgeon: Joy Cabrera MD;  Location: [facility] ENDO (2ND FLR);  Service: Endoscopy;  Laterality: N/A;    CORONARY STENT PLACEMENT  01/2000    DISSECTION OF NECK Bilateral 01/04/2022    Procedure: DISSECTION, NECK;  Surgeon: Naeem Smalls MD;  Location: [facility] OR 2ND FLR;  Service: ENT;  Laterality: Bilateral;    ESOPHAGOGASTRODUODENOSCOPY N/A 09/27/2022    Procedure: EGD (ESOPHAGOGASTRODUODENOSCOPY);  Surgeon: Donnie Peterson MD;  Location: [facility] ENDO (2ND FLR);  Service: Endoscopy;  Laterality: N/A;    ESOPHAGOGASTRODUODENOSCOPY N/A 09/30/2022    Procedure: EGD (ESOPHAGOGASTRODUODENOSCOPY);  Surgeon: Joy Cabrera MD;  Location: [facility] ENDO (2ND FLR);  Service: Endoscopy;  Laterality: N/A;    ESOPHAGOGASTRODUODENOSCOPY W/ PEG N/A 01/04/2022    Procedure: EGD, WITH PEG TUBE INSERTION;  Surgeon: Anthony Calabrese MD;  Location: [facility] OR 2ND FLR;  Service: General;  Laterality: N/A;    EYE SURGERY      Cataract bilateral    femoral stents      bilateral    FLAP PROCEDURE N/A 01/03/2022    Procedure: CREATION, FREE FLAP;  Surgeon: Naeem Smalls MD;  Location: [facility] OR 2ND FLR;  Service: ENT;  Laterality: N/A;    FLAP PROCEDURE Right  01/04/2022    Procedure: CREATION, FREE FLAP;  Surgeon: Stacey Conde MD;  Location: [facility] OR [facility] FLR;  Service: ENT;  Laterality: Right;  Ischemic start 1203  Ischemic stop 1353    GLOSSECTOMY N/A 01/04/2022    Procedure: GLOSSECTOMY;  Surgeon: Naeem Smalls MD;  [facility];  Service: ENT;  Laterality: N/A;    LEFT HEART CATHETERIZATION Left 3/23/2023    Procedure: Left heart cath;  Surgeon: Tacos Benitez MD;  Location: [facility] CATH LAB;  Service: Cardiology;  Laterality: Left;    PERCUTANEOUS TRANSLUMINAL BALLOON ANGIOPLASTY OF CORONARY ARTERY Left 3/27/2023    Procedure: Angioplasty-coronary;  Surgeon: Tim Bhatt MD;  Location: [facility] CATH LAB;  Service: Cardiology;  Laterality: Left;  not before 9am, R rad access, 6 Fr EBU 3.5 guide    RADICAL NECK DISSECTION Left 01/03/2022    Procedure: DISSECTION, NECK, RADICAL;  Surgeon: Naeem Smalls MD;  [facility];  Service: ENT;  Laterality: Left;    SKIN BIOPSY      SKIN CANCER EXCISION      STENT, CORONARY, MULTI VESSEL  3/27/2023    Procedure: Stent, Coronary, Multi Vessel;  Surgeon: Tim Bhatt MD;  Location: [facility] CATH LAB;  Service: Cardiology;;    TRACHEOTOMY N/A 01/04/2022    Procedure: TRACHEOTOMY;  Surgeon: Naeem Smalls MD;  [facility];  Service: ENT;  Laterality: N/A;    VASCULAR SURGERY         Review of patient's allergies indicates:   Allergen Reactions    Ativan [lorazepam] Anxiety       No current facility-administered medications on file prior to encounter.     Current Outpatient Medications on File Prior to Encounter   Medication Sig    albuterol-ipratropium (DUO-NEB) 2.5 mg-0.5 mg/3 mL nebulizer solution Take 3 mLs by nebulization every 4 (four) hours as needed for Wheezing. Rescue    ascorbic acid, vitamin C, (VITAMIN C) 100 MG tablet Take 100 mg by mouth once daily.    aspirin 81 MG Chew Take 1 tablet (81 mg total) by mouth once daily.    atorvastatin (LIPITOR) 40 MG  tablet 1 tablet (40 mg total) by Per G Tube route every evening. (Patient taking differently: 40 mg by Per G Tube route once daily.)    bisacodyL (DULCOLAX) 10 mg Supp Place 1 suppository (10 mg total) rectally daily as needed.    clopidogreL (PLAVIX) 75 mg tablet Take 1 tablet (75 mg total) by mouth once daily. (Patient taking differently: Take 75 mg by mouth nightly.)    ferrous sulfate 325 (65 FE) MG EC tablet Take 325 mg by mouth 3 (three) times daily with meals.    FLUoxetine (PROZAC) 20 mg/5 mL (4 mg/mL) solution Take 20 mg by mouth once daily.    folic acid (FOLVITE) 1 MG tablet Take 1 mg by mouth once daily.    furosemide (LASIX) 40 MG tablet Take 0.5 tablets (20 mg total) by mouth once daily. (Patient taking differently: Take 20 mg by mouth nightly.)    glycopyrrolate (CUVPOSA) 1 mg/5 mL (0.2 mg/mL) Soln Take 5 mLs (1 mg total) by mouth 3 (three) times daily as needed (TO REDUCE SECRETIONS).    hydrOXYzine HCL (ATARAX) 25 MG tablet SMARTSI.5 Tablet(s) Gastro Tube Every 8 Hours PRN    melatonin (MELATIN) 3 mg tablet 1 tablet (3 mg total) by Per G Tube route nightly.    metoprolol tartrate (LOPRESSOR) 25 MG tablet Take 0.5 tablets (12.5 mg total) by mouth 2 (two) times daily.    omeprazole (PRILOSEC) 40 MG capsule Take 40 mg (contents of one capsule) twice daily through PEG tube. Mix contents of capsule with 10 mL applesauce. (Patient taking differently: 40 mg 2 (two) times a day. Take 40 mg (contents of one capsule) twice daily through PEG tube. Mix contents of capsule with 10 mL applesauce.)    polyethylene glycol (GLYCOLAX) 17 gram PwPk 17 g by Per G Tube route 2 (two) times daily.    sodium chloride 3% 3 % nebulizer solution Take 4 mLs by nebulization 2 (two) times a day.    thiamine (VITAMIN B-1) 50 MG tablet Take 50 mg by mouth once daily.    WIXELA INHUB 250-50 mcg/dose diskus inhaler SMARTSI Puff(s) By Mouth Every 12 Hours    [DISCONTINUED] losartan (COZAAR) 50 MG tablet 1 tablet (50 mg total)  by Per G Tube route once daily.     Family History    None       Tobacco Use    Smoking status: Former     Current packs/day: 0.00     Average packs/day: 2.0 packs/day for 55.0 years (110.0 ttl pk-yrs)     Types: Cigarettes     Start date: 1963     Quit date: 2018     Years since quittin.7    Smokeless tobacco: Never    Tobacco comments:     3/3 ppd x 40 yrs. Currently 3-4 cigarettes daily .He is trying  to quit. Is using a Vapor cigarettes  2-3 x's a day.   Substance and Sexual Activity    Alcohol use: Not Currently    Drug use: No    Sexual activity: Not Currently     Review of Systems   Constitutional:  Positive for fatigue and fever.   HENT:  Negative for congestion.    Eyes:  Negative for discharge.   Respiratory:  Positive for cough and shortness of breath. Negative for chest tightness.    Cardiovascular:  Negative for chest pain and leg swelling.   Gastrointestinal:  Negative for abdominal pain.   Endocrine: Negative for polyphagia.   Genitourinary:  Negative for difficulty urinating.   Musculoskeletal:  Positive for arthralgias.   Skin:  Negative for color change.   Allergic/Immunologic: Negative for food allergies.   Neurological:  Negative for headaches.   Psychiatric/Behavioral:  Negative for confusion.      Objective:     Vital Signs (Most Recent):  Temp: 98.3 °F (36.8 °C) (24)  Pulse: 92 (24)  Resp: (!) 23 (24)  BP: 121/73 (24)  SpO2: 99 % (24) Vital Signs (24h Range):  Temp:  [97.8 °F (36.6 °C)-99.1 °F (37.3 °C)] 98.3 °F (36.8 °C)  Pulse:  [] 92  Resp:  [22-40] 23  SpO2:  [85 %-100 %] 99 %  BP: ()/(53-76) 121/73     Weight: 59.9 kg (132 lb)  Body mass index is 19.49 kg/m².     Physical Exam  Vitals and nursing note reviewed.   Constitutional:       General: He is not in acute distress.     Appearance: He is ill-appearing (chronically). He is not toxic-appearing.   HENT:      Head: Normocephalic and atraumatic.       Mouth/Throat:      Mouth: Mucous membranes are moist.   Cardiovascular:      Rate and Rhythm: Normal rate and regular rhythm.   Pulmonary:      Effort: Pulmonary effort is normal.      Breath sounds: Normal breath sounds. No wheezing, rhonchi or rales.      Comments: 10L trach collar  Abdominal:      General: Bowel sounds are normal. There is no distension.      Palpations: Abdomen is soft. There is no mass.      Tenderness: There is no abdominal tenderness. There is no guarding.      Comments: PEG in place   Musculoskeletal:      Right lower leg: No edema.      Left lower leg: No edema.   Skin:     General: Skin is warm and dry.   Neurological:      Mental Status: He is alert. Mental status is at baseline.                  DATA:     Laboratory:  CBC:  Recent Labs   Lab 11/29/23  0506 12/01/23  0433 01/11/24  0021   WBC 8.14 11.91 20.83 H   Hemoglobin 11.2 L 11.7 L 11.2 L   Hematocrit 39.0 L 39.2 L 36.7 L   Platelets 173 168 244       CHEMISTRIES:  Recent Labs   Lab 05/12/22  0343 05/14/22  0724 05/17/22  1007 08/24/22  1000 10/23/23  0527 10/24/23  0414 10/26/23  0537 11/30/23  0452 12/01/23  0433 01/11/24  0021   Glucose 110 96 162 H   < > 154 H 184 H   < > 151 H 169 H 206 H   Sodium 135 L 136 133 L   < > 143 140   < > 154 H 153 H 139   Potassium 4.4 4.1 5.0   < > 5.9 H 3.6   < > 3.5 3.4 L 4.9   BUN 24 H 18 15   < > 47 H 42 H   < > 37 H 39 H 26 H   Creatinine 0.7 0.7 0.7   < > 1.2 1.1   < > 1.0 1.1 0.9   eGFR if  >60.0 >60 >60.0  --   --   --   --   --   --   --    eGFR if non African American >60.0 >60 >60.0  --   --   --   --   --   --   --    Calcium 9.1 9.6 9.5   < > 10.1 9.1   < > 10.1 10.0 9.5   Magnesium 2.0  --  2.0   < > 2.1 2.0  --   --   --  2.1    < > = values in this interval not displayed.       CARDIAC BIOMARKERS:  Recent Labs   Lab 01/11/24  0021 01/11/24  0311 01/11/24  0855   Troponin I 0.393 H 1.011 H 3.344 H       COAGS:  Recent Labs   Lab 04/02/23  0322 11/26/23  0505  01/11/24  0855   INR 1.0 1.1 1.1       LIPIDS/LFTS:  Recent Labs   Lab 08/29/23  1522 10/20/23  0815 11/20/23  1606 11/22/23  0422 01/11/24  0021   Cholesterol 85 L  --   --   --   --    Triglycerides 68  --   --   --   --    HDL 25 L  --   --   --   --    LDL Cholesterol 46.4 L  --   --   --   --    Non-HDL Cholesterol 60  --   --   --   --    AST 20   < > 17 19 24   ALT 28   < > 17 19 18    < > = values in this interval not displayed.       Hemoglobin A1C   Date Value Ref Range Status   08/29/2023 5.8 (H) 4.0 - 5.6 % Final     Comment:     ADA Screening Guidelines:  5.7-6.4%  Consistent with prediabetes  >or=6.5%  Consistent with diabetes    High levels of fetal hemoglobin interfere with the HbA1C  assay. Heterozygous hemoglobin variants (HbS, HgC, etc)do  not significantly interfere with this assay.   However, presence of multiple variants may affect accuracy.     09/27/2022 6.3 (H) 4.0 - 5.6 % Final     Comment:     ADA Screening Guidelines:  5.7-6.4%  Consistent with prediabetes  >or=6.5%  Consistent with diabetes    High levels of fetal hemoglobin interfere with the HbA1C  assay. Heterozygous hemoglobin variants (HbS, HgC, etc)do  not significantly interfere with this assay.   However, presence of multiple variants may affect accuracy.         TSH  Recent Labs   Lab 05/23/23  0849 07/10/23  1040 08/29/23  1522   TSH 4.379 H 3.610 4.460 H       The ASCVD Risk score (Caitlyn DK, et al., 2019) failed to calculate for the following reasons:    The patient has a prior MI or stroke diagnosis       BNP    Lab Results   Component Value Date/Time    BNP 2,467 (H) 01/11/2024 12:21 AM    BNP 1,708 (H) 11/20/2023 04:06 PM     (H) 10/20/2023 08:15 AM    BNP 1,185 (H) 04/18/2023 07:07 AM    BNP 2,543 (H) 03/21/2023 03:25 PM     (H) 03/18/2023 11:06 AM     (H) 09/23/2022 12:47 PM     (H) 09/14/2022 04:59 AM    BNP 98 05/14/2022 07:25 AM     (H) 04/22/2022 05:04 PM    BNP 1,607 (H) 04/19/2022  09:29 AM     (H) 03/21/2022 02:39 PM     (H) 11/02/2019 02:52 AM     (H) 12/27/2018 01:29 PM            ECHO    Results for orders placed during the hospital encounter of 01/11/24    Echo    Interpretation Summary    Left Ventricle: The left ventricle is normal in size. Mildly increased wall thickness. There is concentric hypertrophy. Normal wall motion. There is low normal systolic function with a visually estimated ejection fraction of 50 - 55%. Unable to assess diastolic function due to E-A fusion.    Right Ventricle: Normal right ventricular cavity size. Systolic function is normal.    Left Atrium: Left atrium is moderately dilated.    Right Atrium: Right atrium is mildly dilated.    Mitral Valve: There is mild regurgitation.    Tricuspid Valve: There is mild regurgitation.    Pulmonary Artery: The estimated pulmonary artery systolic pressure is 13 mmHg.    IVC/SVC: Normal venous pressure at 3 mmHg.      STRESS TEST    No results found for this or any previous visit.        CATH    Results for orders placed during the hospital encounter of 03/18/23    Cardiac catheterization    Conclusion  Description of the findings of the procedure: uneventful LHC/cor angio/Ao angio/iliac angio/PCI prox LAD BRADLEY x1, PCI mid LCx BRADLEY x1/R rad vasband/RFA man comp.    Findings/Key Components:  LVEDP: 3mmHg  LVEF: 60%    Aortic arch: severe calcific atherosclerosis  Ost innominate artery 80%  Prox R SCA 90%, 53mmHg gradient across stenoses.  Prox-mid ICA patent  Distal aortoiliac bifurcation with patent stents in ЮЛИЯ bilaterally and possible severe ISR in R ЮЛИЯ.    Severe diffuse cor Ca++  Dominance: Right  LM: MLI  LAD: prox 95% at D1, mid  Ramus: MLI  LCx: mid 90%  RCA: not injected, diffuse disease, severe dist RCA stenosis (see Dr. Benitez cath report    PCI prox LAD:  Preop ASA/Plavix, intraop heparin  Wired LAD/D1  Predil 2.5x12, 2.75x15 NC  Stent 3.0x24 Synergy BRADLEY  Post IVUS with mild underexpansion mid  stent  Postdil 3.25x20 NC at 22atm  Excellent angiographic result, T3 flow, 0% residual stenosis    PCI mid LCx  Predil 2.5x12  Stent 2.75x20 Synergy BRADLEY 14 fidelia  Unavble to pass IVUS due to severe prox LCx tortuosity  Excellent angiographic result, T3 flow, 0% residual stenosis    Hemostasis:  R Radial band  RFA man comp      Assessment and Plan:     * Acute on chronic respiratory failure with hypoxia        Pneumonia        NSTEMI (non-ST elevated myocardial infarction)  TYPE 1 VERSUS TYPE 2.  PATIENT IS CHEST PAIN-FREE.  STATES THAT PRIOR TO HIS PCI WHEN HE HAD THE NON-STEMI LAST TIME, HE DID HAVE CHEST PAINS.  EKG DOES SHOW ST DEPRESSIONS.  CASE DISCUSSED IN DETAIL WITH THE DR. TAMAYO WHO IS THE PATIENT'S PRIMARY CARDIOLOGIST.  LAST PCI WAS COMPLICATED BY PULMONARY AND RETROPERITONEAL HEMORRHAGE.  WHEN THE PATIENT SAW DR. TAMAYO IN CLINIC AS AN OUTPATIENT, DR. TAMAYO HAD RECOMMENDED PALLIATIVE CARE CONSULT.  TODAY WITH ME AND DR. TAMAYO HAD A DETAILED DISCUSSION AND CONSIDERING HIS POOR STATE OF HEALTH, MULTIPLE COMORBIDITIES, THE FACT THAT HE IS CHEST PAIN-FREE AND THE FACT THAT HE HAD MULTIPLE COMPLICATIONS CHRONIC PULMONARY HEMORRHAGE AS WELL AS RETROPERITONEAL HEMORRHAGE AFTER HIS LAST CATHETERIZATION AND PCI BY DR. WEST, WE RECOMMEND MEDICAL MANAGEMENT AND PALLIATIVE CARE CONSULT AT THE CURRENT TIME.  HE IS CURRENTLY COMFORTABLE AND CHEST PAIN-FREE.  CONTINUE MEDICAL MANAGEMENT.  IF DEVELOPS LIFESTYLE LIMITING ANGINA, THEN DISCUSSION OF HIGH-RISK PCI WILL BE HELD.    PEG (percutaneous endoscopic gastrostomy) status        Tracheostomy present        Severe protein-calorie malnutrition          Other hyperlipidemia        Coronary artery disease involving native coronary artery of native heart without angina pectoris  ALTHOUGH PATIENT'S TROPONINS ELEVATED, HE IS CHEST PAIN-FREE.  STATES THAT PRIOR TO HIS STENTS IN MARCH HE HAD CHEST PAINS AND CURRENTLY IS CHEST PAIN-FREE.  IT IS POSSIBLE THAT HIS  TROPONIN COULD BE ELEVATED SECONDARY TO HIS HYPOXEMIA ON PRESENTATION.  HOWEVER DOES HAVE LATERAL ST DEPRESSIONS AND ISCHEMIC LOOKING EKG.    Peripheral vascular disease            VTE Risk Mitigation (From admission, onward)           Ordered     IP VTE LOW RISK PATIENT  Once         01/11/24 1648     heparin 25,000 units in dextrose 5% (100 units/ml) IV bolus from bag - ADDITIONAL PRN BOLUS - 60 units/kg (max bolus 4000 units)  As needed (PRN)        Question:  Heparin Infusion Adjustment (DO NOT MODIFY ANSWER)  Answer:  [email or web address]\Images\Pharmacy\HeparinInfusions\heparin LOW INTENSITY nomogram for [facility] [id number].pdf    01/11/24 0823     heparin 25,000 units in dextrose 5% (100 units/ml) IV bolus from bag - ADDITIONAL PRN BOLUS - 30 units/kg (max bolus 4000 units)  As needed (PRN)        Question:  Heparin Infusion Adjustment (DO NOT MODIFY ANSWER)  Answer:  [email or web address]\Images\Pharmacy\HeparinInfusions\heparin LOW INTENSITY nomogram for [facility] [id number].pdf    01/11/24 0823     heparin 25,000 units in dextrose 5% 250 mL (100 units/mL) infusion LOW INTENSITY nomogram - [facility]  Continuous        Question:  Begin at (units/kg/hr)  Answer:  12    01/11/24 0823     Place sequential compression device  Until discontinued         01/11/24 0225                    Thank you for your consult. I will follow-up with patient. Please contact us if you have any additional questions.    Arturo Malone MD  Cardiology   [facility] - [place], [place] - Intensive Care      Critical Care Time:  35 minutes     Critical care was time spent personally by me on the following activities: development of treatment plan with patient or surrogate and bedside caregivers, discussions with consultants, evaluation of patient's response to treatment, examination of patient, ordering and performing treatments and interventions, ordering and review of laboratory studies, ordering and review of radiographic studies, pulse oximetry, re-evaluation of patient's condition.  This critical care time did not overlap with that of any other provider or involve time for any procedures.

## 2024-01-12 NOTE — ASSESSMENT & PLAN NOTE
ALTHOUGH PATIENT'S TROPONINS ELEVATED, HE IS CHEST PAIN-FREE.  STATES THAT PRIOR TO HIS STENTS IN MARCH HE HAD CHEST PAINS AND CURRENTLY IS CHEST PAIN-FREE.  IT IS POSSIBLE THAT HIS TROPONIN COULD BE ELEVATED SECONDARY TO HIS HYPOXEMIA ON PRESENTATION.  HOWEVER DOES HAVE LATERAL ST DEPRESSIONS AND ISCHEMIC LOOKING EKG.

## 2024-01-12 NOTE — PROGRESS NOTES
[facility] Medicine  Progress Note    Patient Name: Fareed Richard Jr.  MRN: [id number]  Patient Class: IP- Inpatient   Admission Date: 1/11/2024  Length of Stay: 1 days  Attending Physician: Caprice Nava MD  Primary Care Provider: Kodi Tubbs MD        Subjective:     Principal Problem:Acute on chronic respiratory failure with hypoxia        HPI:  Mr Fareed Richard Jr. 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 He is on 6L trach collar at home and receives tube feeding. He had shortness of breath, fever, and increased trach output. Denies chest pain.     In ED, he was tachycardic and tachypneic with SpO2 85% on 8L. He was given albuterol, vanc/zosyn, solumedrol. He was admitted to ICU.     Overview/Hospital Course:  Mr Fareed Richard Jr. is a 73 y.o. man who was admitted with acute on chronic hypoxic respiratory failure due to pneumonia, suspect recurrent Pseudomonas. Also with NSTEMI on admit in setting of known CAD. Started vanc, zosyn. Weaned O2. Cardiology consulted- due to severe CAD and complications with last angiogram <1 year ago, will plan medical management with heparin gtt, asa, plavix, and will NOT pursue ischemic evaluation. He is improving. Stepped down to floor.     Interval History: Feeling well today. No chest congestion, wheezing, cough.     Review of Systems   Respiratory:  Negative for shortness of breath.    Cardiovascular:  Negative for chest pain.   Gastrointestinal:  Negative for abdominal pain.   Genitourinary:  Negative for difficulty urinating.   Psychiatric/Behavioral:  Negative for confusion.      Objective:     Vital Signs (Most Recent):  Temp: 96.7 °F (35.9 °C) (01/12/24 0701)  Pulse: 87 (01/12/24 1000)  Resp: (!) 27 (01/12/24 1000)  BP: 129/71 (01/12/24 1000)  SpO2: 100 % (01/12/24 1000) Vital Signs (24h  Range):  Temp:  [96.7 °F (35.9 °C)-98.3 °F (36.8 °C)] 96.7 °F (35.9 °C)  Pulse:  [] 87  Resp:  [19-39] 27  SpO2:  [95 %-100 %] 100 %  BP: ()/(61-87) 129/71     Weight: 59.9 kg (132 lb)  Body mass index is 19.49 kg/m².    Intake/Output Summary (Last 24 hours) at 1/12/2024 1159  Last data filed at 1/12/2024 0800  Gross per 24 hour   Intake 1525.82 ml   Output 765 ml   Net 760.82 ml         Physical Exam  Vitals and nursing note reviewed.   Constitutional:       General: He is not in acute distress.     Appearance: He is ill-appearing (chronically). He is not toxic-appearing.   Neck:      Comments: Trach in place  Cardiovascular:      Rate and Rhythm: Normal rate and regular rhythm.   Pulmonary:      Effort: Pulmonary effort is normal.      Breath sounds: Normal breath sounds.      Comments: 10L/40% trach collar  Abdominal:      General: Bowel sounds are normal.      Palpations: Abdomen is soft.      Comments: PEG   Musculoskeletal:      Right lower leg: No edema.      Left lower leg: No edema.   Skin:     General: Skin is warm and dry.   Neurological:      Mental Status: He is alert. Mental status is at baseline.             Significant Labs: All pertinent labs within the past 24 hours have been reviewed.    Significant Imaging: I have reviewed all pertinent imaging results/findings within the past 24 hours.    Assessment/Plan:      * Acute on chronic respiratory failure with hypoxia  Patient admitted with Hypoxic which is Acute on Chronic.  he is on home oxygen at 6 LPM trach collar. Signs/symptoms of respiratory failure include- increased work of breathing and respiratory distress present on admission.   - Labs and images were reviewed. Patient Has not has a recent ABG.   - Supplemental oxygen was provided and noted-  trach collar    - Respiratory failure is due to- CHF and Pneumonia   - CT with RML PNA. Trach aspirate suspected Pseudomonas. Continue vanc, zosyn for now- likely can drop vanc tomorrow   -  BNP higher than ever before. TTE normal EF. NSTEMI present. Continue lasix 40mg IV daily- can likely change to PO tomorrow  - CTA no PE  - improving from admit      Pneumonia  RML infiltrate  Trach aspirate with suspect Pseudomonas  - continue vanc, zosyn   - likely DC vanc tomorrow if culture no Staph       Normocytic anemia  Patient's anemia is currently controlled. Has not received any PRBCs to date. Etiology likely d/t  chronic disease  Current CBC reviewed-   Lab Results   Component Value Date    HGB 9.2 (L) 01/12/2024    HCT 30.8 (L) 01/12/2024     Monitor serial CBC and transfuse if patient becomes hemodynamically unstable, symptomatic or H/H drops below 7/21.    Acute on chronic diastolic heart failure  Patient admitted with shortness of breath. His BNP is higher than ever before despite him appearing euvolemic.      BNP  Recent Labs   Lab 01/11/24  0021   BNP 2,467*       CT RML infiltrate  Recent Labs   Lab 01/11/24  0855   TROPONINI 3.344*       EKG not able to see in MUSE- will need to find. Per Cardiology, no STEMI    Continue 40mg IV lasix QD. Monitor UOP. Likely change to PO lasix tomorrow  TTE normal EF, potential diastolic dysfunction  Cardiology consulted for NSTEMI- no ischemic eval planned         NSTEMI (non-ST elevated myocardial infarction)  Admitted with NSTEMI. No chest pain.  Recent Labs   Lab 01/11/24  0855   TROPONINI 3.344*       EKG with ischemic changes   He has know CAD  Started asa, plavix, heparin gtt, statin.   TTE EF 50-55%  Cardiology consulted- no plans for ischemic evaluation given complications with [facility] <1 year ago        PEG (percutaneous endoscopic gastrostomy) status  Patient noted to have a percutaneous endoscopic gastrostomy tube in place. I have personally inspected the tube.Tube was placed prior to this admission There are no signs of drainage or infection around the site. The tube is patent. Medications have converted to liquid form if available.  Routine care to be  done by wound care and nursing staff.   - resumed tube feeds         Tracheostomy present  Noted      ACP (advance care planning)  Advance Care Planning    Date: 01/11/2024  Full code status          Severe protein-calorie malnutrition  Nutrition consulted. Most recent weight and BMI monitored-     Measurements:  Wt Readings from Last 1 Encounters:   01/11/24 59.9 kg (132 lb)   Body mass index is 19.49 kg/m².    Patient has been screened and assessed by RD.    Interventions/Recommendations (treatment strategy):     - continue tube feeds    Other hyperlipidemia  Continue statin      Primary hypertension  Chronic, controlled. Latest blood pressure and vitals reviewed-     Temp:  [96.7 °F (35.9 °C)-98.3 °F (36.8 °C)]   Pulse:  []   Resp:  [19-39]   BP: ()/(61-87)   SpO2:  [95 %-100 %] .   Home meds for hypertension were reviewed and noted below.   Hypertension Medications               furosemide (LASIX) 40 MG tablet Take 0.5 tablets (20 mg total) by mouth once daily.    metoprolol tartrate (LOPRESSOR) 25 MG tablet Take 0.5 tablets (12.5 mg total) by mouth 2 (two) times daily.          - hold BP Rx because BP is borderline low     Will utilize p.r.n. blood pressure medication only if patient's blood pressure greater than 180/110 and he develops symptoms such as worsening chest pain or shortness of breath.    Coronary artery disease involving native coronary artery of native heart without angina pectoris  Patient with known CAD s/p stent placement, which is uncontrolled Will continue ASA, Plavix, and Statin and monitor for S/Sx of angina/ACS. Continue to monitor on telemetry.   - see NSTEMI    Peripheral vascular disease  Known PAD from last angiogram 3/2023  Continue asa, plavix, statin         VTE Risk Mitigation (From admission, onward)           Ordered     IP VTE LOW RISK PATIENT  Once         01/11/24 1648     heparin 25,000 units in dextrose 5% (100 units/ml) IV bolus from bag - ADDITIONAL PRN BOLUS -  60 units/kg (max bolus 4000 units)  As needed (PRN)        Question:  Heparin Infusion Adjustment (DO NOT MODIFY ANSWER)  Answer:  [email or web address]\epic\Images\Pharmacy\HeparinInfusions\heparin LOW INTENSITY nomogram for [facility] [id number].pdf    01/11/24 0823     heparin 25,000 units in dextrose 5% (100 units/ml) IV bolus from bag - ADDITIONAL PRN BOLUS - 30 units/kg (max bolus 4000 units)  As needed (PRN)        Question:  Heparin Infusion Adjustment (DO NOT MODIFY ANSWER)  Answer:  [email or web address]\epic\Images\Pharmacy\HeparinInfusions\heparin LOW INTENSITY nomogram for [facility] [id number].pdf    01/11/24 0823     heparin 25,000 units in dextrose 5% 250 mL (100 units/mL) infusion LOW INTENSITY nomogram - [facility]  Continuous        Question:  Begin at (units/kg/hr)  Answer:  12    01/11/24 0823     Place sequential compression device  Until discontinued         01/11/24 0225                    Discharge Planning   NISA: 1/16/2024     Code Status: Full Code   Is the patient medically ready for discharge?:     Reason for patient still in hospital (select all that apply): Patient trending condition  Discharge Plan A: Home Health            Critical care time spent on the evaluation and treatment of severe organ dysfunction, review of pertinent labs and imaging studies, discussions with consulting providers and discussions with patient/family: 30 minutes.      Caprice Nava MD  Department of Hospital Medicine   [facility] - Intensive Care

## 2024-01-12 NOTE — PROVIDER TRANSFER
Mr Fareed Richard Jr. is a 73 y.o. man with a past medical history of squamous cell carcinoma of the tongue s/p tracheostomy and PEG who was admitted with acute on chronic hypoxic respiratory failure due to pneumonia, suspect recurrent Pseudomonas. Also with NSTEMI on admit in setting of known CAD. Started vanc, zosyn. Weaned O2. Cardiology consulted- due to severe CAD and complications with last angiogram <1 year ago, will plan medical management with heparin gtt, asa, plavix, and will NOT pursue ischemic evaluation. He is improving. Stepped down to floor.      To do  - follow up cultures, likely DC vanc tomorrow if no Staph  - likely change to PO lasix tomorrow  - would complete 48hr heparin gtt for NSTEMI medical management   - dispo: home with HH likely     Caprice Nava MD  01/12/2024 12:08 PM

## 2024-01-12 NOTE — HPI
PATIENT IS A PLEASANT 73-YEAR-OLD MAN.  PATIENT OF DR. TAMAYO.  CAME IN WITH HYPOXEMIA.  MULTIPLE MEDICAL ISSUES AS LISTED BELOW INCLUDING TRACH PEG, CAD COPD PND DYSLIPIDEMIA.  RECENTLY UNDERWENT PCI BY DR. CARMONA IN FOR NON-STEMI IN MARCH OF 2023.  AT THAT TIME HAD PCI OF CIRCUMFLEX AS WELL AS LAD.  NOW COMES IN WITH ELEVATED TROPONIN AND HYPOXEMIA.  DENIES CHEST PAINS.    Recent Labs   Lab 01/11/24  0855   TROPONINI 3.344*             HPI: Mr Fareed Richard Jr. 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 He is on 6L trach collar at home and receives tube feeding. He had shortness of breath, fever, and increased trach output. Denies chest pain.      In ED, he was tachycardic and tachypneic with SpO2 85% on 8L. He was given albuterol, vanc/zosyn, solumedrol. He was admitted to ICU.                 CAD      3/27/23 NSTEMI s/p PCI: Prox LAD 3.0x24 Synergy BRADLEY, Mid LCx 2.75x20 Synergy BRADLEY     Severe PAD with severe innominate and prox R SCA stenoses and R ЮЛИЯ ISR (cath 3/2023)     Ao root dil, 4.2 cm (echo 3/2023)

## 2024-01-12 NOTE — ASSESSMENT & PLAN NOTE
Patient's anemia is currently controlled. Has not received any PRBCs to date. Etiology likely d/t  chronic disease  Current CBC reviewed-   Lab Results   Component Value Date    HGB 9.2 (L) 01/12/2024    HCT 30.8 (L) 01/12/2024     Monitor serial CBC and transfuse if patient becomes hemodynamically unstable, symptomatic or H/H drops below 7/21.

## 2024-01-12 NOTE — ASSESSMENT & PLAN NOTE
Admitted with NSTEMI. No chest pain.  Recent Labs   Lab 01/11/24  0855   TROPONINI 3.344*       EKG with ischemic changes   He has know CAD  Started asa, plavix, heparin gtt, statin.   TTE EF 50-55%  Cardiology consulted- no plans for ischemic evaluation given complications with [facility] <1 year ago

## 2024-01-12 NOTE — SUBJECTIVE & OBJECTIVE
Interval History: Feeling well today. No chest congestion, wheezing, cough.     Review of Systems   Respiratory:  Negative for shortness of breath.    Cardiovascular:  Negative for chest pain.   Gastrointestinal:  Negative for abdominal pain.   Genitourinary:  Negative for difficulty urinating.   Psychiatric/Behavioral:  Negative for confusion.      Objective:     Vital Signs (Most Recent):  Temp: 96.7 °F (35.9 °C) (01/12/24 0701)  Pulse: 87 (01/12/24 1000)  Resp: (!) 27 (01/12/24 1000)  BP: 129/71 (01/12/24 1000)  SpO2: 100 % (01/12/24 1000) Vital Signs (24h Range):  Temp:  [96.7 °F (35.9 °C)-98.3 °F (36.8 °C)] 96.7 °F (35.9 °C)  Pulse:  [] 87  Resp:  [19-39] 27  SpO2:  [95 %-100 %] 100 %  BP: ()/(61-87) 129/71     Weight: 59.9 kg (132 lb)  Body mass index is 19.49 kg/m².    Intake/Output Summary (Last 24 hours) at 1/12/2024 1159  Last data filed at 1/12/2024 0800  Gross per 24 hour   Intake 1525.82 ml   Output 765 ml   Net 760.82 ml         Physical Exam  Vitals and nursing note reviewed.   Constitutional:       General: He is not in acute distress.     Appearance: He is ill-appearing (chronically). He is not toxic-appearing.   Neck:      Comments: Trach in place  Cardiovascular:      Rate and Rhythm: Normal rate and regular rhythm.   Pulmonary:      Effort: Pulmonary effort is normal.      Breath sounds: Normal breath sounds.      Comments: 10L/40% trach collar  Abdominal:      General: Bowel sounds are normal.      Palpations: Abdomen is soft.      Comments: PEG   Musculoskeletal:      Right lower leg: No edema.      Left lower leg: No edema.   Skin:     General: Skin is warm and dry.   Neurological:      Mental Status: He is alert. Mental status is at baseline.             Significant Labs: All pertinent labs within the past 24 hours have been reviewed.    Significant Imaging: I have reviewed all pertinent imaging results/findings within the past 24 hours.   [de-identified] : high pitch sound in both ears [[email or web address]] : Patient has been feeling well but complaining of high pitch noise in the right ear, sometimes on left when he lays down to sleep. He only feels/hears it at night when it is quiet. He plays guitar at home and school and uses an amplifier. He recently went to a Rock Concert as well. He has no experience of hearing loss. Family history is positive for some type of hearing loss on the mother's side, but not deafness. Father explains it as "the bones are too far apart". (mom's father and 2 sisters have this). \par \par Patient has no other symptoms, no cough, weight loss or nasal congestion. He denies grinding his teeth at night but does tend to clench his jaw intermittently. \par

## 2024-01-12 NOTE — SUBJECTIVE & OBJECTIVE
Past Medical History:   Diagnosis Date    Cancer     skin to Rt forearm and face    COPD (chronic obstructive pulmonary disease)     Hyperlipidemia     Hypertension     Pseudoaneurysm        Past Surgical History:   Procedure Laterality Date    ABDOMINAL SURGERY      stents placed in liver and large intestines, per patient    ANGIOGRAPHY OF AORTIC ARCH  3/27/2023    Procedure: Aortogram, Aortic Arch;  Surgeon: Tim Bhatt MD;  Location: [facility] CATH LAB;  Service: Cardiology;;    AORTOGRAPHY WITH SERIALOGRAPHY N/A 3/27/2023    Procedure: AORTOGRAM, WITH SERIALOGRAPHY;  Surgeon: Tim Bhatt MD;  Location: [facility] CATH LAB;  Service: Cardiology;  Laterality: N/A;    CAROTID STENT      COLONOSCOPY N/A 09/27/2022    Procedure: COLONOSCOPY;  Surgeon: Donnie Peterson MD;  [facility]  Service: Endoscopy;  Laterality: N/A;    COLONOSCOPY N/A 09/30/2022    Procedure: COLONOSCOPY;  Surgeon: Joy Cabrera MD;  [facility]  Service: Endoscopy;  Laterality: N/A;    CORONARY STENT PLACEMENT  01/2000    DISSECTION OF NECK Bilateral 01/04/2022    Procedure: DISSECTION, NECK;  Surgeon: Naeem Smalls MD;  [facility];  Service: ENT;  Laterality: Bilateral;    ESOPHAGOGASTRODUODENOSCOPY N/A 09/27/2022    Procedure: EGD (ESOPHAGOGASTRODUODENOSCOPY);  Surgeon: Donnie Peterson MD;  [facility]  Service: Endoscopy;  Laterality: N/A;    ESOPHAGOGASTRODUODENOSCOPY N/A 09/30/2022    Procedure: EGD (ESOPHAGOGASTRODUODENOSCOPY);  Surgeon: Joy Cabrera MD;  Location: [facility] ENDO (2ND FLR);  Service: Endoscopy;  Laterality: N/A;    ESOPHAGOGASTRODUODENOSCOPY W/ PEG N/A 01/04/2022    Procedure: EGD, WITH PEG TUBE INSERTION;  Surgeon: Anthony Calabrese MD;  [facility];  Service: General;  Laterality: N/A;    EYE SURGERY      Cataract bilateral    femoral stents      bilateral    FLAP PROCEDURE N/A 01/03/2022    Procedure: CREATION, FREE FLAP;   Surgeon: Naeem Smalls MD;  [facility];  Service: ENT;  Laterality: N/A;    FLAP PROCEDURE Right 01/04/2022    Procedure: CREATION, FREE FLAP;  Surgeon: Stacey Conde MD;  [facility];  Service: ENT;  Laterality: Right;  Ischemic start 1203  Ischemic stop 1353    GLOSSECTOMY N/A 01/04/2022    Procedure: GLOSSECTOMY;  Surgeon: Naeem Smalls MD;  [facility];  Service: ENT;  Laterality: N/A;    LEFT HEART CATHETERIZATION Left 3/23/2023    Procedure: Left heart cath;  Surgeon: Tacos Benitez MD;  Location: [facility] CATH LAB;  Service: Cardiology;  Laterality: Left;    PERCUTANEOUS TRANSLUMINAL BALLOON ANGIOPLASTY OF CORONARY ARTERY Left 3/27/2023    Procedure: Angioplasty-coronary;  Surgeon: Tim Bhatt MD;  Location: [facility] CATH LAB;  Service: Cardiology;  Laterality: Left;  not before 9am, R rad access, 6 Fr EBU 3.5 guide    RADICAL NECK DISSECTION Left 01/03/2022    Procedure: DISSECTION, NECK, RADICAL;  Surgeon: Naeem Smalls MD;  Location: [facility] OR [address] [place] [address];  Service: ENT;  Laterality: Left;    SKIN BIOPSY      SKIN CANCER EXCISION      STENT, CORONARY, MULTI VESSEL  3/27/2023    Procedure: Stent, Coronary, Multi Vessel;  Surgeon: Tim Bhatt MD;  Location: [facility] CATH LAB;  Service: Cardiology;;    TRACHEOTOMY N/A 01/04/2022    Procedure: TRACHEOTOMY;  Surgeon: Naeem Smalls MD;  [facility];  Service: ENT;  Laterality: N/A;    VASCULAR SURGERY         Review of patient's allergies indicates:   Allergen Reactions    Ativan [lorazepam] Anxiety       No current facility-administered medications on file prior to encounter.     Current Outpatient Medications on File Prior to Encounter   Medication Sig    albuterol-ipratropium (DUO-NEB) 2.5 mg-0.5 mg/3 mL nebulizer solution Take 3 mLs by nebulization every 4 (four) hours as needed for Wheezing. Rescue    ascorbic acid, vitamin C, (VITAMIN C) 100 MG tablet Take 100 mg by mouth  once daily.    aspirin 81 MG Chew Take 1 tablet (81 mg total) by mouth once daily.    atorvastatin (LIPITOR) 40 MG tablet 1 tablet (40 mg total) by Per G Tube route every evening. (Patient taking differently: 40 mg by Per G Tube route once daily.)    bisacodyL (DULCOLAX) 10 mg Supp Place 1 suppository (10 mg total) rectally daily as needed.    clopidogreL (PLAVIX) 75 mg tablet Take 1 tablet (75 mg total) by mouth once daily. (Patient taking differently: Take 75 mg by mouth nightly.)    ferrous sulfate 325 (65 FE) MG EC tablet Take 325 mg by mouth 3 (three) times daily with meals.    FLUoxetine (PROZAC) 20 mg/5 mL (4 mg/mL) solution Take 20 mg by mouth once daily.    folic acid (FOLVITE) 1 MG tablet Take 1 mg by mouth once daily.    furosemide (LASIX) 40 MG tablet Take 0.5 tablets (20 mg total) by mouth once daily. (Patient taking differently: Take 20 mg by mouth nightly.)    glycopyrrolate (CUVPOSA) 1 mg/5 mL (0.2 mg/mL) Soln Take 5 mLs (1 mg total) by mouth 3 (three) times daily as needed (TO REDUCE SECRETIONS).    hydrOXYzine HCL (ATARAX) 25 MG tablet SMARTSI.5 Tablet(s) Gastro Tube Every 8 Hours PRN    melatonin (MELATIN) 3 mg tablet 1 tablet (3 mg total) by Per G Tube route nightly.    metoprolol tartrate (LOPRESSOR) 25 MG tablet Take 0.5 tablets (12.5 mg total) by mouth 2 (two) times daily.    omeprazole (PRILOSEC) 40 MG capsule Take 40 mg (contents of one capsule) twice daily through PEG tube. Mix contents of capsule with 10 mL applesauce. (Patient taking differently: 40 mg 2 (two) times a day. Take 40 mg (contents of one capsule) twice daily through PEG tube. Mix contents of capsule with 10 mL applesauce.)    polyethylene glycol (GLYCOLAX) 17 gram PwPk 17 g by Per G Tube route 2 (two) times daily.    sodium chloride 3% 3 % nebulizer solution Take 4 mLs by nebulization 2 (two) times a day.    thiamine (VITAMIN B-1) 50 MG tablet Take 50 mg by mouth once daily.    WIXELA INHUB 250-50 mcg/dose diskus inhaler  SMARTSI Puff(s) By Mouth Every 12 Hours    [DISCONTINUED] losartan (COZAAR) 50 MG tablet 1 tablet (50 mg total) by Per G Tube route once daily.     Family History    None       Tobacco Use    Smoking status: Former     Current packs/day: 0.00     Average packs/day: 2.0 packs/day for 55.0 years (110.0 ttl pk-yrs)     Types: Cigarettes     Start date: 1963     Quit date: 2018     Years since quittin.7    Smokeless tobacco: Never    Tobacco comments:     3/3 ppd x 40 yrs. Currently 3-4 cigarettes daily .He is trying  to quit. Is using a Vapor cigarettes  2-3 x's a day.   Substance and Sexual Activity    Alcohol use: Not Currently    Drug use: No    Sexual activity: Not Currently     Review of Systems   Constitutional:  Positive for fatigue and fever.   HENT:  Negative for congestion.    Eyes:  Negative for discharge.   Respiratory:  Positive for cough and shortness of breath. Negative for chest tightness.    Cardiovascular:  Negative for chest pain and leg swelling.   Gastrointestinal:  Negative for abdominal pain.   Endocrine: Negative for polyphagia.   Genitourinary:  Negative for difficulty urinating.   Musculoskeletal:  Positive for arthralgias.   Skin:  Negative for color change.   Allergic/Immunologic: Negative for food allergies.   Neurological:  Negative for headaches.   Psychiatric/Behavioral:  Negative for confusion.      Objective:     Vital Signs (Most Recent):  Temp: 98.3 °F (36.8 °C) (24)  Pulse: 92 (24)  Resp: (!) 23 (24)  BP: 121/73 (24)  SpO2: 99 % (24) Vital Signs (24h Range):  Temp:  [97.8 °F (36.6 °C)-99.1 °F (37.3 °C)] 98.3 °F (36.8 °C)  Pulse:  [] 92  Resp:  [22-40] 23  SpO2:  [85 %-100 %] 99 %  BP: ()/(53-76) 121/73     Weight: 59.9 kg (132 lb)  Body mass index is 19.49 kg/m².     Physical Exam  Vitals and nursing note reviewed.   Constitutional:       General: He is not in acute distress.     Appearance: He is  ill-appearing (chronically). He is not toxic-appearing.   HENT:      Head: Normocephalic and atraumatic.      Mouth/Throat:      Mouth: Mucous membranes are moist.   Cardiovascular:      Rate and Rhythm: Normal rate and regular rhythm.   Pulmonary:      Effort: Pulmonary effort is normal.      Breath sounds: Normal breath sounds. No wheezing, rhonchi or rales.      Comments: 10L trach collar  Abdominal:      General: Bowel sounds are normal. There is no distension.      Palpations: Abdomen is soft. There is no mass.      Tenderness: There is no abdominal tenderness. There is no guarding.      Comments: PEG in place   Musculoskeletal:      Right lower leg: No edema.      Left lower leg: No edema.   Skin:     General: Skin is warm and dry.   Neurological:      Mental Status: He is alert. Mental status is at baseline.                  DATA:     Laboratory:  CBC:  Recent Labs   Lab 11/29/23  0506 12/01/23  0433 01/11/24  0021   WBC 8.14 11.91 20.83 H   Hemoglobin 11.2 L 11.7 L 11.2 L   Hematocrit 39.0 L 39.2 L 36.7 L   Platelets 173 168 244       CHEMISTRIES:  Recent Labs   Lab 05/12/22  0343 05/14/22  0724 05/17/22  1007 08/24/22  1000 10/23/23  0527 10/24/23  0414 10/26/23  0537 11/30/23  0452 12/01/23  0433 01/11/24  0021   Glucose 110 96 162 H   < > 154 H 184 H   < > 151 H 169 H 206 H   Sodium 135 L 136 133 L   < > 143 140   < > 154 H 153 H 139   Potassium 4.4 4.1 5.0   < > 5.9 H 3.6   < > 3.5 3.4 L 4.9   BUN 24 H 18 15   < > 47 H 42 H   < > 37 H 39 H 26 H   Creatinine 0.7 0.7 0.7   < > 1.2 1.1   < > 1.0 1.1 0.9   eGFR if  >60.0 >60 >60.0  --   --   --   --   --   --   --    eGFR if non African American >60.0 >60 >60.0  --   --   --   --   --   --   --    Calcium 9.1 9.6 9.5   < > 10.1 9.1   < > 10.1 10.0 9.5   Magnesium 2.0  --  2.0   < > 2.1 2.0  --   --   --  2.1    < > = values in this interval not displayed.       CARDIAC BIOMARKERS:  Recent Labs   Lab 01/11/24  0021 01/11/24  0311  01/11/24  0855   Troponin I 0.393 H 1.011 H 3.344 H       COAGS:  Recent Labs   Lab 04/02/23  0322 11/26/23  0505 01/11/24  0855   INR 1.0 1.1 1.1       LIPIDS/LFTS:  Recent Labs   Lab 08/29/23  1522 10/20/23  0815 11/20/23  1606 11/22/23  0422 01/11/24  0021   Cholesterol 85 L  --   --   --   --    Triglycerides 68  --   --   --   --    HDL 25 L  --   --   --   --    LDL Cholesterol 46.4 L  --   --   --   --    Non-HDL Cholesterol 60  --   --   --   --    AST 20   < > 17 19 24   ALT 28   < > 17 19 18    < > = values in this interval not displayed.       Hemoglobin A1C   Date Value Ref Range Status   08/29/2023 5.8 (H) 4.0 - 5.6 % Final     Comment:     ADA Screening Guidelines:  5.7-6.4%  Consistent with prediabetes  >or=6.5%  Consistent with diabetes    High levels of fetal hemoglobin interfere with the HbA1C  assay. Heterozygous hemoglobin variants (HbS, HgC, etc)do  not significantly interfere with this assay.   However, presence of multiple variants may affect accuracy.     09/27/2022 6.3 (H) 4.0 - 5.6 % Final     Comment:     ADA Screening Guidelines:  5.7-6.4%  Consistent with prediabetes  >or=6.5%  Consistent with diabetes    High levels of fetal hemoglobin interfere with the HbA1C  assay. Heterozygous hemoglobin variants (HbS, HgC, etc)do  not significantly interfere with this assay.   However, presence of multiple variants may affect accuracy.         TSH  Recent Labs   Lab 05/23/23  0849 07/10/23  1040 08/29/23  1522   TSH 4.379 H 3.610 4.460 H       The ASCVD Risk score (Caitlyn DK, et al., 2019) failed to calculate for the following reasons:    The patient has a prior MI or stroke diagnosis       BNP    Lab Results   Component Value Date/Time    BNP 2,467 (H) 01/11/2024 12:21 AM    BNP 1,708 (H) 11/20/2023 04:06 PM     (H) 10/20/2023 08:15 AM    BNP 1,185 (H) 04/18/2023 07:07 AM    BNP 2,543 (H) 03/21/2023 03:25 PM     (H) 03/18/2023 11:06 AM     (H) 09/23/2022 12:47 PM      (H) 09/14/2022 04:59 AM    BNP 98 05/14/2022 07:25 AM     (H) 04/22/2022 05:04 PM    BNP 1,607 (H) 04/19/2022 09:29 AM     (H) 03/21/2022 02:39 PM     (H) 11/02/2019 02:52 AM     (H) 12/27/2018 01:29 PM            ECHO    Results for orders placed during the hospital encounter of 01/11/24    Echo    Interpretation Summary    Left Ventricle: The left ventricle is normal in size. Mildly increased wall thickness. There is concentric hypertrophy. Normal wall motion. There is low normal systolic function with a visually estimated ejection fraction of 50 - 55%. Unable to assess diastolic function due to E-A fusion.    Right Ventricle: Normal right ventricular cavity size. Systolic function is normal.    Left Atrium: Left atrium is moderately dilated.    Right Atrium: Right atrium is mildly dilated.    Mitral Valve: There is mild regurgitation.    Tricuspid Valve: There is mild regurgitation.    Pulmonary Artery: The estimated pulmonary artery systolic pressure is 13 mmHg.    IVC/SVC: Normal venous pressure at 3 mmHg.      STRESS TEST    No results found for this or any previous visit.        CATH    Results for orders placed during the hospital encounter of 03/18/23    Cardiac catheterization    Conclusion  Description of the findings of the procedure: uneventful LHC/cor angio/Ao angio/iliac angio/PCI prox LAD BRADLEY x1, PCI mid LCx BRADLEY x1/R rad vasband/RFA man comp.    Findings/Key Components:  LVEDP: 3mmHg  LVEF: 60%    Aortic arch: severe calcific atherosclerosis  Ost innominate artery 80%  Prox R SCA 90%, 53mmHg gradient across stenoses.  Prox-mid ICA patent  Distal aortoiliac bifurcation with patent stents in ЮЛИЯ bilaterally and possible severe ISR in R ЮЛИЯ.    Severe diffuse cor Ca++  Dominance: Right  LM: MLI  LAD: prox 95% at D1, mid  Ramus: MLI  LCx: mid 90%  RCA: not injected, diffuse disease, severe dist RCA stenosis (see Dr. Benitez cath report    PCI prox LAD:  Preop ASA/Plavix,  intraop heparin  Wired LAD/D1  Predil 2.5x12, 2.75x15 NC  Stent 3.0x24 Synergy BRADLEY  Post IVUS with mild underexpansion mid stent  Postdil 3.25x20 NC at 22atm  Excellent angiographic result, T3 flow, 0% residual stenosis    PCI mid LCx  Predil 2.5x12  Stent 2.75x20 Synergy BRADLEY 14 fidelia  Unavble to pass IVUS due to severe prox LCx tortuosity  Excellent angiographic result, T3 flow, 0% residual stenosis    Hemostasis:  R Radial band  RFA man comp

## 2024-01-12 NOTE — NURSING
[facility] [place]  Nurses Note -- 4 Eyes      1/11/2024       Skin assessed on: Q Shift      [x] No Pressure Injuries Present    [x]Prevention Measures Documented    [] Yes LDA  for Pressure Injury Previously documented     [] Yes New Pressure Injury Discovered   [] LDA for New Pressure Injury Added      Attending RN:  Yadi Fenton RN     Second RN:  KAMILAH Cross

## 2024-01-12 NOTE — ASSESSMENT & PLAN NOTE
TYPE 1 VERSUS TYPE 2.  PATIENT IS CHEST PAIN-FREE.  STATES THAT PRIOR TO HIS PCI WHEN HE HAD THE NON-STEMI LAST TIME, HE DID HAVE CHEST PAINS.  EKG DOES SHOW ST DEPRESSIONS.  CASE DISCUSSED IN DETAIL WITH THE DR. TAMAYO WHO IS THE PATIENT'S PRIMARY CARDIOLOGIST.  LAST PCI WAS COMPLICATED BY PULMONARY AND RETROPERITONEAL HEMORRHAGE.  WHEN THE PATIENT SAW DR. TAMAYO IN CLINIC AS AN OUTPATIENT, DR. TAMAYO HAD RECOMMENDED PALLIATIVE CARE CONSULT.  TODAY WITH ME AND DR. TAMAYO HAD A DETAILED DISCUSSION AND CONSIDERING HIS POOR STATE OF HEALTH, MULTIPLE COMORBIDITIES, THE FACT THAT HE IS CHEST PAIN-FREE AND THE FACT THAT HE HAD MULTIPLE COMPLICATIONS CHRONIC PULMONARY HEMORRHAGE AS WELL AS RETROPERITONEAL HEMORRHAGE AFTER HIS LAST CATHETERIZATION AND PCI BY DR. WEST, WE RECOMMEND MEDICAL MANAGEMENT AND PALLIATIVE CARE CONSULT AT THE CURRENT TIME.  HE IS CURRENTLY COMFORTABLE AND CHEST PAIN-FREE.  CONTINUE MEDICAL MANAGEMENT.  IF DEVELOPS LIFESTYLE LIMITING ANGINA, THEN DISCUSSION OF HIGH-RISK PCI WILL BE HELD.

## 2024-01-12 NOTE — NURSING
[facility]  Nurses Note -- 4 Eyes      1/12/2024       Skin assessed on: Q Shift      [x] No Pressure Injuries Present    [x]Prevention Measures Documented    [] Yes LDA  for Pressure Injury Previously documented     [] Yes New Pressure Injury Discovered   [] LDA for New Pressure Injury Added      Attending RN:  America NAILS RN     Second RN: Yadi Rodrigues, RN

## 2024-01-12 NOTE — SUBJECTIVE & OBJECTIVE
Interval History:  No chest pains.    Past Medical History:   Diagnosis Date    Cancer     skin to Rt forearm and face    COPD (chronic obstructive pulmonary disease)     Hyperlipidemia     Hypertension     Pseudoaneurysm        Past Surgical History:   Procedure Laterality Date    ABDOMINAL SURGERY      stents placed in liver and large intestines, per patient    ANGIOGRAPHY OF AORTIC ARCH  3/27/2023    Procedure: Aortogram, Aortic Arch;  Surgeon: Tim Bhatt MD;  Location: [facility] CATH LAB;  Service: Cardiology;;    AORTOGRAPHY WITH SERIALOGRAPHY N/A 3/27/2023    Procedure: AORTOGRAM, WITH SERIALOGRAPHY;  Surgeon: Tim Bhatt MD;  Location: [facility] CATH LAB;  Service: Cardiology;  Laterality: N/A;    CAROTID STENT      COLONOSCOPY N/A 09/27/2022    Procedure: COLONOSCOPY;  Surgeon: Donnie Peterson MD;  Location: [facility] ENDO (2ND FLR);  Service: Endoscopy;  Laterality: N/A;    COLONOSCOPY N/A 09/30/2022    Procedure: COLONOSCOPY;  Surgeon: Joy Cabrera MD;  Location: [facility] ENDO (2ND FLR);  Service: Endoscopy;  Laterality: N/A;    CORONARY STENT PLACEMENT  01/2000    DISSECTION OF NECK Bilateral 01/04/2022    Procedure: DISSECTION, NECK;  Surgeon: Naeem Smalls MD;  Location: [facility] OR 2ND FLR;  Service: ENT;  Laterality: Bilateral;    ESOPHAGOGASTRODUODENOSCOPY N/A 09/27/2022    Procedure: EGD (ESOPHAGOGASTRODUODENOSCOPY);  Surgeon: Donnie Peterson MD;  Location: [facility] ENDO (2ND FLR);  Service: Endoscopy;  Laterality: N/A;    ESOPHAGOGASTRODUODENOSCOPY N/A 09/30/2022    Procedure: EGD (ESOPHAGOGASTRODUODENOSCOPY);  Surgeon: Joy Cabrera MD;  Location: [facility] ENDO (2ND FLR);  Service: Endoscopy;  Laterality: N/A;    ESOPHAGOGASTRODUODENOSCOPY W/ PEG N/A 01/04/2022    Procedure: EGD, WITH PEG TUBE INSERTION;  Surgeon: Anthony Calabrese MD;  Location: [facility] OR 2ND FLR;  Service: General;  Laterality: N/A;    EYE SURGERY      Cataract bilateral    femoral stents      bilateral    FLAP PROCEDURE N/A 01/03/2022     Procedure: CREATION, FREE FLAP;  Surgeon: Naeem Smalls MD;  Location: [facility] OR [facility] FLR;  Service: ENT;  Laterality: N/A;    FLAP PROCEDURE Right 01/04/2022    Procedure: CREATION, FREE FLAP;  Surgeon: Stacey Conde MD;  [facility];  Service: ENT;  Laterality: Right;  Ischemic start 1203  Ischemic stop 1353    GLOSSECTOMY N/A 01/04/2022    Procedure: GLOSSECTOMY;  Surgeon: Naeem Smalls MD;  [facility];  Service: ENT;  Laterality: N/A;    LEFT HEART CATHETERIZATION Left 3/23/2023    Procedure: Left heart cath;  Surgeon: Tacos Benitez MD;  Location: [facility] CATH LAB;  Service: Cardiology;  Laterality: Left;    PERCUTANEOUS TRANSLUMINAL BALLOON ANGIOPLASTY OF CORONARY ARTERY Left 3/27/2023    Procedure: Angioplasty-coronary;  Surgeon: Tim Bhatt MD;  Location: [facility] CATH LAB;  Service: Cardiology;  Laterality: Left;  not before 9am, R rad access, 6 Fr EBU 3.5 guide    RADICAL NECK DISSECTION Left 01/03/2022    Procedure: DISSECTION, NECK, RADICAL;  Surgeon: Naeem Smalls MD;  [facility];  Service: ENT;  Laterality: Left;    SKIN BIOPSY      SKIN CANCER EXCISION      STENT, CORONARY, MULTI VESSEL  3/27/2023    Procedure: Stent, Coronary, Multi Vessel;  Surgeon: Tim Bhatt MD;  Location: [facility] CATH LAB;  Service: Cardiology;;    TRACHEOTOMY N/A 01/04/2022    Procedure: TRACHEOTOMY;  Surgeon: Naeem Smalls MD;  [facility];  Service: ENT;  Laterality: N/A;    VASCULAR SURGERY         Review of patient's allergies indicates:   Allergen Reactions    Ativan [lorazepam] Anxiety       No current facility-administered medications on file prior to encounter.     Current Outpatient Medications on File Prior to Encounter   Medication Sig    albuterol-ipratropium (DUO-NEB) 2.5 mg-0.5 mg/3 mL nebulizer solution Take 3 mLs by nebulization every 4 (four) hours as needed for Wheezing. Rescue    ascorbic acid, vitamin C, (VITAMIN C)  100 MG tablet Take 100 mg by mouth once daily.    aspirin 81 MG Chew Take 1 tablet (81 mg total) by mouth once daily.    atorvastatin (LIPITOR) 40 MG tablet 1 tablet (40 mg total) by Per G Tube route every evening. (Patient taking differently: 40 mg by Per G Tube route once daily.)    bisacodyL (DULCOLAX) 10 mg Supp Place 1 suppository (10 mg total) rectally daily as needed.    clopidogreL (PLAVIX) 75 mg tablet Take 1 tablet (75 mg total) by mouth once daily. (Patient taking differently: Take 75 mg by mouth nightly.)    ferrous sulfate 325 (65 FE) MG EC tablet Take 325 mg by mouth 3 (three) times daily with meals.    FLUoxetine (PROZAC) 20 mg/5 mL (4 mg/mL) solution Take 20 mg by mouth once daily.    folic acid (FOLVITE) 1 MG tablet Take 1 mg by mouth once daily.    furosemide (LASIX) 40 MG tablet Take 0.5 tablets (20 mg total) by mouth once daily. (Patient taking differently: Take 20 mg by mouth nightly.)    glycopyrrolate (CUVPOSA) 1 mg/5 mL (0.2 mg/mL) Soln Take 5 mLs (1 mg total) by mouth 3 (three) times daily as needed (TO REDUCE SECRETIONS).    hydrOXYzine HCL (ATARAX) 25 MG tablet SMARTSI.5 Tablet(s) Gastro Tube Every 8 Hours PRN    melatonin (MELATIN) 3 mg tablet 1 tablet (3 mg total) by Per G Tube route nightly.    metoprolol tartrate (LOPRESSOR) 25 MG tablet Take 0.5 tablets (12.5 mg total) by mouth 2 (two) times daily.    omeprazole (PRILOSEC) 40 MG capsule Take 40 mg (contents of one capsule) twice daily through PEG tube. Mix contents of capsule with 10 mL applesauce. (Patient taking differently: 40 mg 2 (two) times a day. Take 40 mg (contents of one capsule) twice daily through PEG tube. Mix contents of capsule with 10 mL applesauce.)    polyethylene glycol (GLYCOLAX) 17 gram PwPk 17 g by Per G Tube route 2 (two) times daily.    sodium chloride 3% 3 % nebulizer solution Take 4 mLs by nebulization 2 (two) times a day.    thiamine (VITAMIN B-1) 50 MG tablet Take 50 mg by mouth once daily.    WIXELA  INHUB 250-50 mcg/dose diskus inhaler SMARTSI Puff(s) By Mouth Every 12 Hours    [DISCONTINUED] losartan (COZAAR) 50 MG tablet 1 tablet (50 mg total) by Per G Tube route once daily.     Family History    None       Tobacco Use    Smoking status: Former     Current packs/day: 0.00     Average packs/day: 2.0 packs/day for 55.0 years (110.0 ttl pk-yrs)     Types: Cigarettes     Start date: 1963     Quit date: 2018     Years since quittin.7    Smokeless tobacco: Never    Tobacco comments:     3/3 ppd x 40 yrs. Currently 3-4 cigarettes daily .He is trying  to quit. Is using a Vapor cigarettes  2-3 x's a day.   Substance and Sexual Activity    Alcohol use: Not Currently    Drug use: No    Sexual activity: Not Currently     Review of Systems   Constitutional:  Positive for fatigue and fever.   HENT:  Negative for congestion.    Eyes:  Negative for discharge.   Respiratory:  Positive for cough and shortness of breath. Negative for chest tightness.    Cardiovascular:  Negative for chest pain and leg swelling.   Gastrointestinal:  Negative for abdominal pain.   Endocrine: Negative for polyphagia.   Genitourinary:  Negative for difficulty urinating.   Musculoskeletal:  Positive for arthralgias.   Skin:  Negative for color change.   Allergic/Immunologic: Negative for food allergies.   Neurological:  Negative for headaches.   Psychiatric/Behavioral:  Negative for confusion.      Objective:     Vital Signs (Most Recent):  Temp: 98.2 °F (36.8 °C) (24 1501)  Pulse: 106 (24 1623)  Resp: (!) 24 (24 1630)  BP: 111/72 (24 1600)  SpO2: 97 % (24 1623) Vital Signs (24h Range):  Temp:  [96.7 °F (35.9 °C)-98.3 °F (36.8 °C)] 98.2 °F (36.8 °C)  Pulse:  [] 106  Resp:  [18-39] 24  SpO2:  [95 %-100 %] 97 %  BP: ()/(68-87) 111/72     Weight: 59.9 kg (132 lb)  Body mass index is 19.49 kg/m².     Physical Exam  Vitals and nursing note reviewed.   Constitutional:       General: He is not in  acute distress.     Appearance: He is ill-appearing (chronically). He is not toxic-appearing.   HENT:      Head: Normocephalic and atraumatic.      Mouth/Throat:      Mouth: Mucous membranes are moist.   Cardiovascular:      Rate and Rhythm: Normal rate and regular rhythm.   Pulmonary:      Effort: Pulmonary effort is normal.      Breath sounds: Normal breath sounds. No wheezing, rhonchi or rales.      Comments: 10L trach collar  Abdominal:      General: Bowel sounds are normal. There is no distension.      Palpations: Abdomen is soft. There is no mass.      Tenderness: There is no abdominal tenderness. There is no guarding.      Comments: PEG in place   Musculoskeletal:      Right lower leg: No edema.      Left lower leg: No edema.   Skin:     General: Skin is warm and dry.   Neurological:      Mental Status: He is alert. Mental status is at baseline.                  DATA:     Laboratory:  CBC:  Recent Labs   Lab 12/01/23  0433 01/11/24  0021 01/12/24  0635   WBC 11.91 20.83 H 12.29   Hemoglobin 11.7 L 11.2 L 9.2 L   Hematocrit 39.2 L 36.7 L 30.8 L   Platelets 168 244 177         CHEMISTRIES:  Recent Labs   Lab 05/12/22  0343 05/14/22  0724 05/17/22  1007 08/24/22  1000 10/24/23  0414 10/26/23  0537 12/01/23  0433 01/11/24  0021 01/12/24  0635   Glucose 110 96 162 H   < > 184 H   < > 169 H 206 H 167 H   Sodium 135 L 136 133 L   < > 140   < > 153 H 139 134 L   Potassium 4.4 4.1 5.0   < > 3.6   < > 3.4 L 4.9 3.6   BUN 24 H 18 15   < > 42 H   < > 39 H 26 H 25 H   Creatinine 0.7 0.7 0.7   < > 1.1   < > 1.1 0.9 0.9   eGFR if  >60.0 >60 >60.0  --   --   --   --   --   --    eGFR if non African American >60.0 >60 >60.0  --   --   --   --   --   --    Calcium 9.1 9.6 9.5   < > 9.1   < > 10.0 9.5 8.9   Magnesium 2.0  --  2.0   < > 2.0  --   --  2.1 2.1    < > = values in this interval not displayed.         CARDIAC BIOMARKERS:  Recent Labs   Lab 01/11/24  0021 01/11/24  0311 01/11/24  0855   Troponin I  0.393 H 1.011 H 3.344 H         COAGS:  Recent Labs   Lab 04/02/23  0322 11/26/23  0505 01/11/24  0855   INR 1.0 1.1 1.1         LIPIDS/LFTS:  Recent Labs   Lab 08/29/23  1522 10/20/23  0815 11/22/23  0422 01/11/24  0021 01/12/24  0635   Cholesterol 85 L  --   --   --  95 L   Triglycerides 68  --   --   --  60   HDL 25 L  --   --   --  37 L   LDL Cholesterol 46.4 L  --   --   --  46.0 L   Non-HDL Cholesterol 60  --   --   --  58   AST 20   < > 19 24 55 H   ALT 28   < > 19 18 29    < > = values in this interval not displayed.         Hemoglobin A1C   Date Value Ref Range Status   08/29/2023 5.8 (H) 4.0 - 5.6 % Final     Comment:     ADA Screening Guidelines:  5.7-6.4%  Consistent with prediabetes  >or=6.5%  Consistent with diabetes    High levels of fetal hemoglobin interfere with the HbA1C  assay. Heterozygous hemoglobin variants (HbS, HgC, etc)do  not significantly interfere with this assay.   However, presence of multiple variants may affect accuracy.     09/27/2022 6.3 (H) 4.0 - 5.6 % Final     Comment:     ADA Screening Guidelines:  5.7-6.4%  Consistent with prediabetes  >or=6.5%  Consistent with diabetes    High levels of fetal hemoglobin interfere with the HbA1C  assay. Heterozygous hemoglobin variants (HbS, HgC, etc)do  not significantly interfere with this assay.   However, presence of multiple variants may affect accuracy.         TSH  Recent Labs   Lab 05/23/23  0849 07/10/23  1040 08/29/23  1522   TSH 4.379 H 3.610 4.460 H         The ASCVD Risk score (Caitlyn DK, et al., 2019) failed to calculate for the following reasons:    The patient has a prior MI or stroke diagnosis       BNP    Lab Results   Component Value Date/Time    BNP 2,467 (H) 01/11/2024 12:21 AM    BNP 1,708 (H) 11/20/2023 04:06 PM     (H) 10/20/2023 08:15 AM    BNP 1,185 (H) 04/18/2023 07:07 AM    BNP 2,543 (H) 03/21/2023 03:25 PM     (H) 03/18/2023 11:06 AM     (H) 09/23/2022 12:47 PM     (H) 09/14/2022 04:59  AM    BNP 98 05/14/2022 07:25 AM     (H) 04/22/2022 05:04 PM    BNP 1,607 (H) 04/19/2022 09:29 AM     (H) 03/21/2022 02:39 PM     (H) 11/02/2019 02:52 AM     (H) 12/27/2018 01:29 PM            ECHO    Results for orders placed during the hospital encounter of 01/11/24    Echo    Interpretation Summary    Left Ventricle: The left ventricle is normal in size. Mildly increased wall thickness. There is concentric hypertrophy. Normal wall motion. There is low normal systolic function with a visually estimated ejection fraction of 50 - 55%. Unable to assess diastolic function due to E-A fusion.    Right Ventricle: Normal right ventricular cavity size. Systolic function is normal.    Left Atrium: Left atrium is moderately dilated.    Right Atrium: Right atrium is mildly dilated.    Mitral Valve: There is mild regurgitation.    Tricuspid Valve: There is mild regurgitation.    Pulmonary Artery: The estimated pulmonary artery systolic pressure is 13 mmHg.    IVC/SVC: Normal venous pressure at 3 mmHg.      STRESS TEST    No results found for this or any previous visit.        CATH    Results for orders placed during the hospital encounter of 03/18/23    Cardiac catheterization    Conclusion  Description of the findings of the procedure: uneventful LHC/cor angio/Ao angio/iliac angio/PCI prox LAD BRADLEY x1, PCI mid LCx BRADLEY x1/R rad vasband/RFA man comp.    Findings/Key Components:  LVEDP: 3mmHg  LVEF: 60%    Aortic arch: severe calcific atherosclerosis  Ost innominate artery 80%  Prox R SCA 90%, 53mmHg gradient across stenoses.  Prox-mid ICA patent  Distal aortoiliac bifurcation with patent stents in ЮЛИЯ bilaterally and possible severe ISR in R ЮЛИЯ.    Severe diffuse cor Ca++  Dominance: Right  LM: MLI  LAD: prox 95% at D1, mid  Ramus: MLI  LCx: mid 90%  RCA: not injected, diffuse disease, severe dist RCA stenosis (see Dr. Benitez cath report    PCI prox LAD:  Preop ASA/Plavix, intraop heparin  Wired  LAD/D1  Predil 2.5x12, 2.75x15 NC  Stent 3.0x24 Synergy BRADLEY  Post IVUS with mild underexpansion mid stent  Postdil 3.25x20 NC at 22atm  Excellent angiographic result, T3 flow, 0% residual stenosis    PCI mid LCx  Predil 2.5x12  Stent 2.75x20 Synergy BRADLEY 14 fidelia  Unavble to pass IVUS due to severe prox LCx tortuosity  Excellent angiographic result, T3 flow, 0% residual stenosis    Hemostasis:  R Radial band  RFA man comp

## 2024-01-12 NOTE — ASSESSMENT & PLAN NOTE
Chronic, controlled. Latest blood pressure and vitals reviewed-     Temp:  [96.7 °F (35.9 °C)-98.3 °F (36.8 °C)]   Pulse:  []   Resp:  [19-39]   BP: ()/(61-87)   SpO2:  [95 %-100 %] .   Home meds for hypertension were reviewed and noted below.   Hypertension Medications               furosemide (LASIX) 40 MG tablet Take 0.5 tablets (20 mg total) by mouth once daily.    metoprolol tartrate (LOPRESSOR) 25 MG tablet Take 0.5 tablets (12.5 mg total) by mouth 2 (two) times daily.          - hold BP Rx because BP is borderline low     Will utilize p.r.n. blood pressure medication only if patient's blood pressure greater than 180/110 and he develops symptoms such as worsening chest pain or shortness of breath.

## 2024-01-12 NOTE — PLAN OF CARE
Patient remain in ICU alert oriented X4 , oxygen via tracheostomy collar @ 10/40 , inj heparin gtt continue @ 12 u . Free of fall injury and skin break down. POC reviewed.  Problem: Adult Inpatient Plan of Care  Goal: Plan of Care Review  Outcome: Ongoing, Progressing  Goal: Patient-Specific Goal (Individualized)  Outcome: Ongoing, Progressing  Goal: Absence of Hospital-Acquired Illness or Injury  Outcome: Ongoing, Progressing  Goal: Optimal Comfort and Wellbeing  Outcome: Ongoing, Progressing  Goal: Readiness for Transition of Care  Outcome: Ongoing, Progressing     Problem: Skin Injury Risk Increased  Goal: Skin Health and Integrity  Outcome: Ongoing, Progressing     Problem: Fall Injury Risk  Goal: Absence of Fall and Fall-Related Injury  Outcome: Ongoing, Progressing     Problem: Fluid Imbalance (Pneumonia)  Goal: Fluid Balance  Outcome: Ongoing, Progressing     Problem: Infection (Pneumonia)  Goal: Resolution of Infection Signs and Symptoms  Outcome: Ongoing, Progressing     Problem: Respiratory Compromise (Pneumonia)  Goal: Effective Oxygenation and Ventilation  Outcome: Ongoing, Progressing     Problem: Adjustment to Illness (Acute Coronary Syndrome)  Goal: Optimal Adaptation to Illness  Outcome: Ongoing, Progressing     Problem: Dysrhythmia (Acute Coronary Syndrome)  Goal: Normalized Cardiac Rhythm  Outcome: Ongoing, Progressing     Problem: Cardiac-Related Pain (Acute Coronary Syndrome)  Goal: Absence of Cardiac-Related Pain  Outcome: Ongoing, Progressing     Problem: Hemodynamic Instability (Acute Coronary Syndrome)  Goal: Effective Cardiac Pump Function  Outcome: Ongoing, Progressing     Problem: Tissue Perfusion (Acute Coronary Syndrome)  Goal: Adequate Tissue Perfusion  Outcome: Ongoing, Progressing     Problem: Arrhythmia/Dysrhythmia (Cardiac Catheterization)  Goal: Stable Heart Rate and Rhythm  Outcome: Ongoing, Progressing     Problem: Bleeding (Cardiac Catheterization)  Goal: Absence of  Bleeding  Outcome: Ongoing, Progressing     Problem: Contrast-Induced Injury Risk (Cardiac Catheterization)  Goal: Absence of Contrast-Induced Injury  Outcome: Ongoing, Progressing     Problem: Embolism (Cardiac Catheterization)  Goal: Absence of Embolism Signs and Symptoms  Outcome: Ongoing, Progressing     Problem: Ongoing Anesthesia/Sedation Effects (Cardiac Catheterization)  Goal: Anesthesia/Sedation Recovery  Outcome: Ongoing, Progressing     Problem: Pain (Cardiac Catheterization)  Goal: Acceptable Pain Control  Outcome: Ongoing, Progressing     Problem: Vascular Access Protection (Cardiac Catheterization)  Goal: Absence of Vascular Access Complication  Outcome: Ongoing, Progressing

## 2024-01-13 LAB
ALBUMIN SERPL BCP-MCNC: 2.6 G/DL (ref 3.5–5.2)
ALP SERPL-CCNC: 74 U/L (ref 55–135)
ALT SERPL W/O P-5'-P-CCNC: 33 U/L (ref 10–44)
ANION GAP SERPL CALC-SCNC: 14 MMOL/L (ref 8–16)
APTT PPP: 27.8 SEC (ref 21–32)
APTT PPP: 35.7 SEC (ref 21–32)
AST SERPL-CCNC: 38 U/L (ref 10–40)
BACTERIA SPEC AEROBE CULT: ABNORMAL
BASOPHILS # BLD AUTO: 0.02 K/UL (ref 0–0.2)
BASOPHILS NFR BLD: 0.2 % (ref 0–1.9)
BILIRUB SERPL-MCNC: 0.5 MG/DL (ref 0.1–1)
BUN SERPL-MCNC: 22 MG/DL (ref 8–23)
CALCIUM SERPL-MCNC: 8.7 MG/DL (ref 8.7–10.5)
CHLORIDE SERPL-SCNC: 88 MMOL/L (ref 95–110)
CO2 SERPL-SCNC: 31 MMOL/L (ref 23–29)
CREAT SERPL-MCNC: 1 MG/DL (ref 0.5–1.4)
DIFFERENTIAL METHOD BLD: ABNORMAL
EOSINOPHIL # BLD AUTO: 0 K/UL (ref 0–0.5)
EOSINOPHIL NFR BLD: 0.1 % (ref 0–8)
ERYTHROCYTE [DISTWIDTH] IN BLOOD BY AUTOMATED COUNT: 15.2 % (ref 11.5–14.5)
EST. GFR  (NO RACE VARIABLE): >60 ML/MIN/1.73 M^2
GLUCOSE SERPL-MCNC: 328 MG/DL (ref 70–110)
GRAM STN SPEC: ABNORMAL
HCT VFR BLD AUTO: 30.9 % (ref 40–54)
HGB BLD-MCNC: 9.5 G/DL (ref 14–18)
IMM GRANULOCYTES # BLD AUTO: 0.04 K/UL (ref 0–0.04)
IMM GRANULOCYTES NFR BLD AUTO: 0.4 % (ref 0–0.5)
LYMPHOCYTES # BLD AUTO: 0.4 K/UL (ref 1–4.8)
LYMPHOCYTES NFR BLD: 4.6 % (ref 18–48)
MAGNESIUM SERPL-MCNC: 1.7 MG/DL (ref 1.6–2.6)
MCH RBC QN AUTO: 29.2 PG (ref 27–31)
MCHC RBC AUTO-ENTMCNC: 30.7 G/DL (ref 32–36)
MCV RBC AUTO: 95 FL (ref 82–98)
MONOCYTES # BLD AUTO: 1.2 K/UL (ref 0.3–1)
MONOCYTES NFR BLD: 12 % (ref 4–15)
NEUTROPHILS # BLD AUTO: 7.9 K/UL (ref 1.8–7.7)
NEUTROPHILS NFR BLD: 82.7 % (ref 38–73)
NRBC BLD-RTO: 0 /100 WBC
PLATELET # BLD AUTO: 172 K/UL (ref 150–450)
PMV BLD AUTO: 10.1 FL (ref 9.2–12.9)
POCT GLUCOSE: 112 MG/DL (ref 70–110)
POCT GLUCOSE: 120 MG/DL (ref 70–110)
POCT GLUCOSE: 125 MG/DL (ref 70–110)
POCT GLUCOSE: 179 MG/DL (ref 70–110)
POTASSIUM SERPL-SCNC: 3 MMOL/L (ref 3.5–5.1)
PROT SERPL-MCNC: 6.5 G/DL (ref 6–8.4)
RBC # BLD AUTO: 3.25 M/UL (ref 4.6–6.2)
SODIUM SERPL-SCNC: 133 MMOL/L (ref 136–145)
VANCOMYCIN TROUGH SERPL-MCNC: 17.3 UG/ML (ref 10–22)
WBC # BLD AUTO: 9.56 K/UL (ref 3.9–12.7)

## 2024-01-13 PROCEDURE — 85025 COMPLETE CBC W/AUTO DIFF WBC: CPT | Performed by: HOSPITALIST

## 2024-01-13 PROCEDURE — 27100171 HC OXYGEN HIGH FLOW UP TO 24 HOURS

## 2024-01-13 PROCEDURE — 36415 COLL VENOUS BLD VENIPUNCTURE: CPT | Mod: XB | Performed by: HOSPITALIST

## 2024-01-13 PROCEDURE — 25000003 PHARM REV CODE 250: Performed by: STUDENT IN AN ORGANIZED HEALTH CARE EDUCATION/TRAINING PROGRAM

## 2024-01-13 PROCEDURE — 99900026 HC AIRWAY MAINTENANCE (STAT)

## 2024-01-13 PROCEDURE — 94640 AIRWAY INHALATION TREATMENT: CPT

## 2024-01-13 PROCEDURE — 21400001 HC TELEMETRY ROOM

## 2024-01-13 PROCEDURE — 85730 THROMBOPLASTIN TIME PARTIAL: CPT | Performed by: HOSPITALIST

## 2024-01-13 PROCEDURE — 85730 THROMBOPLASTIN TIME PARTIAL: CPT | Mod: 91 | Performed by: HOSPITALIST

## 2024-01-13 PROCEDURE — 63600175 PHARM REV CODE 636 W HCPCS: Performed by: STUDENT IN AN ORGANIZED HEALTH CARE EDUCATION/TRAINING PROGRAM

## 2024-01-13 PROCEDURE — 63600175 PHARM REV CODE 636 W HCPCS: Performed by: HOSPITALIST

## 2024-01-13 PROCEDURE — 25000003 PHARM REV CODE 250: Performed by: HOSPITALIST

## 2024-01-13 PROCEDURE — 25000242 PHARM REV CODE 250 ALT 637 W/ HCPCS: Performed by: STUDENT IN AN ORGANIZED HEALTH CARE EDUCATION/TRAINING PROGRAM

## 2024-01-13 PROCEDURE — 83735 ASSAY OF MAGNESIUM: CPT | Performed by: HOSPITALIST

## 2024-01-13 PROCEDURE — 99291 CRITICAL CARE FIRST HOUR: CPT | Mod: ,,, | Performed by: INTERNAL MEDICINE

## 2024-01-13 PROCEDURE — 94760 N-INVAS EAR/PLS OXIMETRY 1: CPT

## 2024-01-13 PROCEDURE — 80202 ASSAY OF VANCOMYCIN: CPT | Performed by: HOSPITALIST

## 2024-01-13 PROCEDURE — 99900035 HC TECH TIME PER 15 MIN (STAT)

## 2024-01-13 PROCEDURE — 27200966 HC CLOSED SUCTION SYSTEM

## 2024-01-13 PROCEDURE — 94761 N-INVAS EAR/PLS OXIMETRY MLT: CPT

## 2024-01-13 PROCEDURE — 80053 COMPREHEN METABOLIC PANEL: CPT | Performed by: HOSPITALIST

## 2024-01-13 PROCEDURE — 20000000 HC ICU ROOM

## 2024-01-13 RX ORDER — HEPARIN SODIUM,PORCINE/D5W 25000/250
0-40 INTRAVENOUS SOLUTION INTRAVENOUS CONTINUOUS
Status: ACTIVE | OUTPATIENT
Start: 2024-01-13 | End: 2024-01-13

## 2024-01-13 RX ADMIN — POTASSIUM BICARBONATE 40 MEQ: 391 TABLET, EFFERVESCENT ORAL at 02:01

## 2024-01-13 RX ADMIN — PIPERACILLIN AND TAZOBACTAM 4.5 G: 4; .5 INJECTION, POWDER, LYOPHILIZED, FOR SOLUTION INTRAVENOUS; PARENTERAL at 12:01

## 2024-01-13 RX ADMIN — IPRATROPIUM BROMIDE AND ALBUTEROL SULFATE 3 ML: 2.5; .5 SOLUTION RESPIRATORY (INHALATION) at 04:01

## 2024-01-13 RX ADMIN — MELATONIN TAB 3 MG 6 MG: 3 TAB at 08:01

## 2024-01-13 RX ADMIN — POTASSIUM BICARBONATE 40 MEQ: 391 TABLET, EFFERVESCENT ORAL at 08:01

## 2024-01-13 RX ADMIN — FOLIC ACID 1 MG: 1 TABLET ORAL at 08:01

## 2024-01-13 RX ADMIN — CEFEPIME 2 G: 2 INJECTION, POWDER, FOR SOLUTION INTRAVENOUS at 05:01

## 2024-01-13 RX ADMIN — IPRATROPIUM BROMIDE AND ALBUTEROL SULFATE 3 ML: 2.5; .5 SOLUTION RESPIRATORY (INHALATION) at 07:01

## 2024-01-13 RX ADMIN — FLUOXETINE HYDROCHLORIDE 20 MG: 20 SOLUTION ORAL at 08:01

## 2024-01-13 RX ADMIN — MUPIROCIN: 20 OINTMENT TOPICAL at 08:01

## 2024-01-13 RX ADMIN — SODIUM CHLORIDE SOLN NEBU 3% 4 ML: 3 NEBU SOLN at 07:01

## 2024-01-13 RX ADMIN — IPRATROPIUM BROMIDE AND ALBUTEROL SULFATE 3 ML: 2.5; .5 SOLUTION RESPIRATORY (INHALATION) at 11:01

## 2024-01-13 RX ADMIN — ATORVASTATIN CALCIUM 40 MG: 40 TABLET, FILM COATED ORAL at 08:01

## 2024-01-13 RX ADMIN — IPRATROPIUM BROMIDE AND ALBUTEROL SULFATE 3 ML: 2.5; .5 SOLUTION RESPIRATORY (INHALATION) at 12:01

## 2024-01-13 RX ADMIN — CLOPIDOGREL BISULFATE 75 MG: 75 TABLET ORAL at 08:01

## 2024-01-13 RX ADMIN — FUROSEMIDE 20 MG: 20 TABLET ORAL at 08:01

## 2024-01-13 RX ADMIN — CEFEPIME 2 G: 2 INJECTION, POWDER, FOR SOLUTION INTRAVENOUS at 09:01

## 2024-01-13 RX ADMIN — VANCOMYCIN HYDROCHLORIDE 1750 MG: 500 INJECTION, POWDER, LYOPHILIZED, FOR SOLUTION INTRAVENOUS at 04:01

## 2024-01-13 RX ADMIN — HEPARIN SODIUM 17 UNITS/KG/HR: 10000 INJECTION, SOLUTION INTRAVENOUS at 06:01

## 2024-01-13 RX ADMIN — ASPIRIN 81 MG CHEWABLE TABLET 81 MG: 81 TABLET CHEWABLE at 08:01

## 2024-01-13 NOTE — PLAN OF CARE
Patient remain in ICU as boarder, AAOx4, VSS , tube feed continue @ 20 patient spo2 is >95 on 10l/ 80 %( trach collar)inez-orbital cellulitis reviewed with patient and family.  Problem: Adult Inpatient Plan of Care  Goal: Plan of Care Review  Outcome: Ongoing, Progressing  Goal: Patient-Specific Goal (Individualized)  Outcome: Ongoing, Progressing  Goal: Absence of Hospital-Acquired Illness or Injury  Outcome: Ongoing, Progressing  Goal: Optimal Comfort and Wellbeing  Outcome: Ongoing, Progressing  Goal: Readiness for Transition of Care  Outcome: Ongoing, Progressing     Problem: Skin Injury Risk Increased  Goal: Skin Health and Integrity  Outcome: Ongoing, Progressing     Problem: Fall Injury Risk  Goal: Absence of Fall and Fall-Related Injury  Outcome: Ongoing, Progressing     Problem: Fluid Imbalance (Pneumonia)  Goal: Fluid Balance  Outcome: Ongoing, Progressing     Problem: Infection (Pneumonia)  Goal: Resolution of Infection Signs and Symptoms  Outcome: Ongoing, Progressing     Problem: Respiratory Compromise (Pneumonia)  Goal: Effective Oxygenation and Ventilation  Outcome: Ongoing, Progressing     Problem: Adjustment to Illness (Acute Coronary Syndrome)  Goal: Optimal Adaptation to Illness  Outcome: Ongoing, Progressing     Problem: Dysrhythmia (Acute Coronary Syndrome)  Goal: Normalized Cardiac Rhythm  Outcome: Ongoing, Progressing     Problem: Cardiac-Related Pain (Acute Coronary Syndrome)  Goal: Absence of Cardiac-Related Pain  Outcome: Ongoing, Progressing     Problem: Hemodynamic Instability (Acute Coronary Syndrome)  Goal: Effective Cardiac Pump Function  Outcome: Ongoing, Progressing     Problem: Tissue Perfusion (Acute Coronary Syndrome)  Goal: Adequate Tissue Perfusion  Outcome: Ongoing, Progressing     Problem: Arrhythmia/Dysrhythmia (Cardiac Catheterization)  Goal: Stable Heart Rate and Rhythm  Outcome: Ongoing, Progressing     Problem: Bleeding (Cardiac Catheterization)  Goal: Absence of  Bleeding  Outcome: Ongoing, Progressing     Problem: Contrast-Induced Injury Risk (Cardiac Catheterization)  Goal: Absence of Contrast-Induced Injury  Outcome: Ongoing, Progressing     Problem: Embolism (Cardiac Catheterization)  Goal: Absence of Embolism Signs and Symptoms  Outcome: Ongoing, Progressing     Problem: Ongoing Anesthesia/Sedation Effects (Cardiac Catheterization)  Goal: Anesthesia/Sedation Recovery  Outcome: Ongoing, Progressing     Problem: Pain (Cardiac Catheterization)  Goal: Acceptable Pain Control  Outcome: Ongoing, Progressing     Problem: Vascular Access Protection (Cardiac Catheterization)  Goal: Absence of Vascular Access Complication  Outcome: Ongoing, Progressing

## 2024-01-13 NOTE — SUBJECTIVE & OBJECTIVE
Interval History:  No chest pains.    Past Medical History:   Diagnosis Date    Cancer     skin to Rt forearm and face    COPD (chronic obstructive pulmonary disease)     Hyperlipidemia     Hypertension     Pseudoaneurysm        Past Surgical History:   Procedure Laterality Date    ABDOMINAL SURGERY      stents placed in liver and large intestines, per patient    ANGIOGRAPHY OF AORTIC ARCH  3/27/2023    Procedure: Aortogram, Aortic Arch;  Surgeon: Tim Bhatt MD;  Location: [facility] CATH LAB;  Service: Cardiology;;    AORTOGRAPHY WITH SERIALOGRAPHY N/A 3/27/2023    Procedure: AORTOGRAM, WITH SERIALOGRAPHY;  Surgeon: Tim Bhatt MD;  Location: [facility] CATH LAB;  Service: Cardiology;  Laterality: N/A;    CAROTID STENT      COLONOSCOPY N/A 09/27/2022    Procedure: COLONOSCOPY;  Surgeon: Donnie Peterson MD;  Location: [facility] ENDO (2ND FLR);  Service: Endoscopy;  Laterality: N/A;    COLONOSCOPY N/A 09/30/2022    Procedure: COLONOSCOPY;  Surgeon: Joy Cabrera MD;  Location: [facility] ENDO (2ND FLR);  Service: Endoscopy;  Laterality: N/A;    CORONARY STENT PLACEMENT  01/2000    DISSECTION OF NECK Bilateral 01/04/2022    Procedure: DISSECTION, NECK;  Surgeon: Naeem Smalls MD;  Location: [facility] OR 2ND FLR;  Service: ENT;  Laterality: Bilateral;    ESOPHAGOGASTRODUODENOSCOPY N/A 09/27/2022    Procedure: EGD (ESOPHAGOGASTRODUODENOSCOPY);  Surgeon: Donnie Peterson MD;  Location: [facility] ENDO (2ND FLR);  Service: Endoscopy;  Laterality: N/A;    ESOPHAGOGASTRODUODENOSCOPY N/A 09/30/2022    Procedure: EGD (ESOPHAGOGASTRODUODENOSCOPY);  Surgeon: Joy Cabrera MD;  Location: [facility] ENDO (2ND FLR);  Service: Endoscopy;  Laterality: N/A;    ESOPHAGOGASTRODUODENOSCOPY W/ PEG N/A 01/04/2022    Procedure: EGD, WITH PEG TUBE INSERTION;  Surgeon: Anthony Calabrese MD;  Location: [facility] OR 2ND FLR;  Service: General;  Laterality: N/A;    EYE SURGERY      Cataract bilateral    femoral stents      bilateral    FLAP PROCEDURE N/A 01/03/2022     Procedure: CREATION, FREE FLAP;  Surgeon: Naeem Smalls MD;  Location: [facility] OR [facility] FLR;  Service: ENT;  Laterality: N/A;    FLAP PROCEDURE Right 01/04/2022    Procedure: CREATION, FREE FLAP;  Surgeon: Stacey Conde MD;  [facility];  Service: ENT;  Laterality: Right;  Ischemic start 1203  Ischemic stop 1353    GLOSSECTOMY N/A 01/04/2022    Procedure: GLOSSECTOMY;  Surgeon: Naeem Smalls MD;  [facility];  Service: ENT;  Laterality: N/A;    LEFT HEART CATHETERIZATION Left 3/23/2023    Procedure: Left heart cath;  Surgeon: Tacos Benitez MD;  Location: [facility] CATH LAB;  Service: Cardiology;  Laterality: Left;    PERCUTANEOUS TRANSLUMINAL BALLOON ANGIOPLASTY OF CORONARY ARTERY Left 3/27/2023    Procedure: Angioplasty-coronary;  Surgeon: Tim Bhatt MD;  Location: [facility] CATH LAB;  Service: Cardiology;  Laterality: Left;  not before 9am, R rad access, 6 Fr EBU 3.5 guide    RADICAL NECK DISSECTION Left 01/03/2022    Procedure: DISSECTION, NECK, RADICAL;  Surgeon: Naeem Smalls MD;  [facility];  Service: ENT;  Laterality: Left;    SKIN BIOPSY      SKIN CANCER EXCISION      STENT, CORONARY, MULTI VESSEL  3/27/2023    Procedure: Stent, Coronary, Multi Vessel;  Surgeon: Tim Bhatt MD;  Location: [facility] CATH LAB;  Service: Cardiology;;    TRACHEOTOMY N/A 01/04/2022    Procedure: TRACHEOTOMY;  Surgeon: Naeem Smalls MD;  [facility];  Service: ENT;  Laterality: N/A;    VASCULAR SURGERY         Review of patient's allergies indicates:   Allergen Reactions    Ativan [lorazepam] Anxiety       No current facility-administered medications on file prior to encounter.     Current Outpatient Medications on File Prior to Encounter   Medication Sig    albuterol-ipratropium (DUO-NEB) 2.5 mg-0.5 mg/3 mL nebulizer solution Take 3 mLs by nebulization every 4 (four) hours as needed for Wheezing. Rescue    ascorbic acid, vitamin C, (VITAMIN C)  100 MG tablet Take 100 mg by mouth once daily.    aspirin 81 MG Chew Take 1 tablet (81 mg total) by mouth once daily.    atorvastatin (LIPITOR) 40 MG tablet 1 tablet (40 mg total) by Per G Tube route every evening. (Patient taking differently: 40 mg by Per G Tube route once daily.)    bisacodyL (DULCOLAX) 10 mg Supp Place 1 suppository (10 mg total) rectally daily as needed.    clopidogreL (PLAVIX) 75 mg tablet Take 1 tablet (75 mg total) by mouth once daily. (Patient taking differently: Take 75 mg by mouth nightly.)    ferrous sulfate 325 (65 FE) MG EC tablet Take 325 mg by mouth 3 (three) times daily with meals.    FLUoxetine (PROZAC) 20 mg/5 mL (4 mg/mL) solution Take 20 mg by mouth once daily.    folic acid (FOLVITE) 1 MG tablet Take 1 mg by mouth once daily.    furosemide (LASIX) 40 MG tablet Take 0.5 tablets (20 mg total) by mouth once daily. (Patient taking differently: Take 20 mg by mouth nightly.)    glycopyrrolate (CUVPOSA) 1 mg/5 mL (0.2 mg/mL) Soln Take 5 mLs (1 mg total) by mouth 3 (three) times daily as needed (TO REDUCE SECRETIONS).    hydrOXYzine HCL (ATARAX) 25 MG tablet SMARTSI.5 Tablet(s) Gastro Tube Every 8 Hours PRN    melatonin (MELATIN) 3 mg tablet 1 tablet (3 mg total) by Per G Tube route nightly.    metoprolol tartrate (LOPRESSOR) 25 MG tablet Take 0.5 tablets (12.5 mg total) by mouth 2 (two) times daily.    omeprazole (PRILOSEC) 40 MG capsule Take 40 mg (contents of one capsule) twice daily through PEG tube. Mix contents of capsule with 10 mL applesauce. (Patient taking differently: 40 mg 2 (two) times a day. Take 40 mg (contents of one capsule) twice daily through PEG tube. Mix contents of capsule with 10 mL applesauce.)    polyethylene glycol (GLYCOLAX) 17 gram PwPk 17 g by Per G Tube route 2 (two) times daily.    sodium chloride 3% 3 % nebulizer solution Take 4 mLs by nebulization 2 (two) times a day.    thiamine (VITAMIN B-1) 50 MG tablet Take 50 mg by mouth once daily.    WIXELA  INHUB 250-50 mcg/dose diskus inhaler SMARTSI Puff(s) By Mouth Every 12 Hours    [DISCONTINUED] losartan (COZAAR) 50 MG tablet 1 tablet (50 mg total) by Per G Tube route once daily.     Family History    None       Tobacco Use    Smoking status: Former     Current packs/day: 0.00     Average packs/day: 2.0 packs/day for 55.0 years (110.0 ttl pk-yrs)     Types: Cigarettes     Start date: 1963     Quit date: 2018     Years since quittin.7    Smokeless tobacco: Never    Tobacco comments:     3/3 ppd x 40 yrs. Currently 3-4 cigarettes daily .He is trying  to quit. Is using a Vapor cigarettes  2-3 x's a day.   Substance and Sexual Activity    Alcohol use: Not Currently    Drug use: No    Sexual activity: Not Currently     Review of Systems   Constitutional:  Positive for fatigue and fever.   HENT:  Negative for congestion.    Eyes:  Negative for discharge.   Respiratory:  Positive for cough and shortness of breath. Negative for chest tightness.    Cardiovascular:  Negative for chest pain and leg swelling.   Gastrointestinal:  Negative for abdominal pain.   Endocrine: Negative for polyphagia.   Genitourinary:  Negative for difficulty urinating.   Musculoskeletal:  Positive for arthralgias.   Skin:  Negative for color change.   Allergic/Immunologic: Negative for food allergies.   Neurological:  Negative for headaches.   Psychiatric/Behavioral:  Negative for confusion.      Objective:     Vital Signs (Most Recent):  Temp: 98.4 °F (36.9 °C) (24 1101)  Pulse: 107 (24 1300)  Resp: (!) 28 (24 1300)  BP: 114/86 (24 1300)  SpO2: 95 % (24 1300) Vital Signs (24h Range):  Temp:  [98.3 °F (36.8 °C)-99 °F (37.2 °C)] 98.4 °F (36.9 °C)  Pulse:  [] 107  Resp:  [20-43] 28  SpO2:  [89 %-100 %] 95 %  BP: (105-141)/(67-86) 114/86     Weight: 59.9 kg (132 lb)  Body mass index is 19.49 kg/m².     Physical Exam  Vitals and nursing note reviewed.   Constitutional:       General: He is not in  acute distress.     Appearance: He is ill-appearing (chronically). He is not toxic-appearing.   HENT:      Head: Normocephalic and atraumatic.      Mouth/Throat:      Mouth: Mucous membranes are moist.   Cardiovascular:      Rate and Rhythm: Normal rate and regular rhythm.   Pulmonary:      Effort: Pulmonary effort is normal.      Breath sounds: Normal breath sounds. No wheezing, rhonchi or rales.      Comments: 10L trach collar  Abdominal:      General: Bowel sounds are normal. There is no distension.      Palpations: Abdomen is soft. There is no mass.      Tenderness: There is no abdominal tenderness. There is no guarding.      Comments: PEG in place   Musculoskeletal:      Right lower leg: No edema.      Left lower leg: No edema.   Skin:     General: Skin is warm and dry.   Neurological:      Mental Status: He is alert. Mental status is at baseline.                  DATA:     Laboratory:  CBC:  Recent Labs   Lab 01/11/24  0021 01/12/24  0635 01/13/24  0241   WBC 20.83 H 12.29 9.56   Hemoglobin 11.2 L 9.2 L 9.5 L   Hematocrit 36.7 L 30.8 L 30.9 L   Platelets 244 177 172         CHEMISTRIES:  Recent Labs   Lab 05/12/22  0343 05/14/22  0724 05/17/22  1007 08/24/22  1000 01/11/24  0021 01/12/24  0635 01/13/24  0241   Glucose 110 96 162 H   < > 206 H 167 H 328 H   Sodium 135 L 136 133 L   < > 139 134 L 133 L   Potassium 4.4 4.1 5.0   < > 4.9 3.6 3.0 L   BUN 24 H 18 15   < > 26 H 25 H 22   Creatinine 0.7 0.7 0.7   < > 0.9 0.9 1.0   eGFR if African American >60.0 >60 >60.0  --   --   --   --    eGFR if non African American >60.0 >60 >60.0  --   --   --   --    Calcium 9.1 9.6 9.5   < > 9.5 8.9 8.7   Magnesium 2.0  --  2.0   < > 2.1 2.1 1.7    < > = values in this interval not displayed.         CARDIAC BIOMARKERS:  Recent Labs   Lab 01/11/24  0021 01/11/24  0311 01/11/24  0855   Troponin I 0.393 H 1.011 H 3.344 H         COAGS:  Recent Labs   Lab 04/02/23  0322 11/26/23  0505 01/11/24  0855   INR 1.0 1.1 1.1          LIPIDS/LFTS:  Recent Labs   Lab 08/29/23  1522 10/20/23  0815 01/11/24  0021 01/12/24  0635 01/13/24  0241   Cholesterol 85 L  --   --  95 L  --    Triglycerides 68  --   --  60  --    HDL 25 L  --   --  37 L  --    LDL Cholesterol 46.4 L  --   --  46.0 L  --    Non-HDL Cholesterol 60  --   --  58  --    AST 20   < > 24 55 H 38   ALT 28   < > 18 29 33    < > = values in this interval not displayed.         Hemoglobin A1C   Date Value Ref Range Status   08/29/2023 5.8 (H) 4.0 - 5.6 % Final     Comment:     ADA Screening Guidelines:  5.7-6.4%  Consistent with prediabetes  >or=6.5%  Consistent with diabetes    High levels of fetal hemoglobin interfere with the HbA1C  assay. Heterozygous hemoglobin variants (HbS, HgC, etc)do  not significantly interfere with this assay.   However, presence of multiple variants may affect accuracy.     09/27/2022 6.3 (H) 4.0 - 5.6 % Final     Comment:     ADA Screening Guidelines:  5.7-6.4%  Consistent with prediabetes  >or=6.5%  Consistent with diabetes    High levels of fetal hemoglobin interfere with the HbA1C  assay. Heterozygous hemoglobin variants (HbS, HgC, etc)do  not significantly interfere with this assay.   However, presence of multiple variants may affect accuracy.         TSH  Recent Labs   Lab 05/23/23  0849 07/10/23  1040 08/29/23  1522   TSH 4.379 H 3.610 4.460 H         The ASCVD Risk score (Caitlyn DK, et al., 2019) failed to calculate for the following reasons:    The patient has a prior MI or stroke diagnosis       BNP    Lab Results   Component Value Date/Time    BNP 2,467 (H) 01/11/2024 12:21 AM    BNP 1,708 (H) 11/20/2023 04:06 PM     (H) 10/20/2023 08:15 AM    BNP 1,185 (H) 04/18/2023 07:07 AM    BNP 2,543 (H) 03/21/2023 03:25 PM     (H) 03/18/2023 11:06 AM     (H) 09/23/2022 12:47 PM     (H) 09/14/2022 04:59 AM    BNP 98 05/14/2022 07:25 AM     (H) 04/22/2022 05:04 PM    BNP 1,607 (H) 04/19/2022 09:29 AM     (H)  03/21/2022 02:39 PM     (H) 11/02/2019 02:52 AM     (H) 12/27/2018 01:29 PM            ECHO    Results for orders placed during the hospital encounter of 01/11/24    Echo    Interpretation Summary    Left Ventricle: The left ventricle is normal in size. Mildly increased wall thickness. There is concentric hypertrophy. Normal wall motion. There is low normal systolic function with a visually estimated ejection fraction of 50 - 55%. Unable to assess diastolic function due to E-A fusion.    Right Ventricle: Normal right ventricular cavity size. Systolic function is normal.    Left Atrium: Left atrium is moderately dilated.    Right Atrium: Right atrium is mildly dilated.    Mitral Valve: There is mild regurgitation.    Tricuspid Valve: There is mild regurgitation.    Pulmonary Artery: The estimated pulmonary artery systolic pressure is 13 mmHg.    IVC/SVC: Normal venous pressure at 3 mmHg.      STRESS TEST    No results found for this or any previous visit.        CATH    Results for orders placed during the hospital encounter of 03/18/23    Cardiac catheterization    Conclusion  Description of the findings of the procedure: uneventful LHC/cor angio/Ao angio/iliac angio/PCI prox LAD BRADLEY x1, PCI mid LCx BRADLEY x1/R rad vasband/RFA man comp.    Findings/Key Components:  LVEDP: 3mmHg  LVEF: 60%    Aortic arch: severe calcific atherosclerosis  Ost innominate artery 80%  Prox R SCA 90%, 53mmHg gradient across stenoses.  Prox-mid ICA patent  Distal aortoiliac bifurcation with patent stents in ЮЛИЯ bilaterally and possible severe ISR in R ЮЛИЯ.    Severe diffuse cor Ca++  Dominance: Right  LM: MLI  LAD: prox 95% at D1, mid  Ramus: MLI  LCx: mid 90%  RCA: not injected, diffuse disease, severe dist RCA stenosis (see Dr. Benitez cath report    PCI prox LAD:  Preop ASA/Plavix, intraop heparin  Wired LAD/D1  Predil 2.5x12, 2.75x15 NC  Stent 3.0x24 Synergy BRADLEY  Post IVUS with mild underexpansion mid stent  Postdil 3.25x20  NC at 22atm  Excellent angiographic result, T3 flow, 0% residual stenosis    PCI mid LCx  Predil 2.5x12  Stent 2.75x20 Synergy BRADLEY 14 fidelia  Unavble to pass IVUS due to severe prox LCx tortuosity  Excellent angiographic result, T3 flow, 0% residual stenosis    Hemostasis:  R Radial band  RFA man comp

## 2024-01-13 NOTE — NURSING
[facility] Intensive Care  ICU Shift Summary  Date: 1/12/2024      Prehospitalization: Home  Admit Date / LOS : 1/11/2024/ 1 days    Diagnosis: Acute on chronic respiratory failure with hypoxia    Consults:        Active: Cardio, Palliative, and SW       Needed: N/A     Code Status: Full Code   Advanced Directive: Not Received    LDA:  Lines/Drains/Airways       Drain  Duration                  Gastrostomy/Enterostomy 01/04/22 Percutaneous endoscopic gastrostomy (PEG)  days              Airway  Duration             Adult Surgical Airway 03/16/23 1014 Shiley Cuffed 6.0 302 days              Peripheral Intravenous Line  Duration                  Peripheral IV - Single Lumen 01/10/24 2341 18 G Left;Posterior Forearm 1 day         Peripheral IV - Single Lumen 01/11/24 0013 20 G Right Antecubital 1 day                  Central Lines/Site/Justification:Patient Does Not Have Central Line  Urinary Cath/Order/Justification:Patient Does Not Have Urinary Catheter    Vasopressors/Infusions:    heparin (porcine) in D5W 12 Units/kg/hr (01/12/24 1800)          GOALS: Volume/ Hemodynamic: N/A                     RASS: 0  alert and calm    Pain Management: none       Pain Controlled: yes     Rhythm: NSR    Respiratory Device: tracheostomy collor  Oxygen Concentration (%):  [40] 40                 Most Recent SBT/ SAT: N/A       MOVE Screen: FAIL  ICU Liberation: not applicable    VTE Prophylaxis: Pharm  Mobility: Ambulatory and Bedrest  Stress Ulcer Prophylaxis: Yes    Isolation: No active isolations    Dietary:   Current Diet Order   Procedures    Diet NPO Except for: Sips with Medication     Order Specific Question:   Except for     Answer:   Sips with Medication      Tolerance: no  Advancement: no    I & O (24h):    Intake/Output Summary (Last 24 hours) at 1/12/2024 1842  Last data filed at 1/12/2024 1800  Gross per 24 hour   Intake 1559.83 ml   Output 1815 ml   Net -255.17 ml        Restraints: No    Significant  Dates:  Post Op Date: N/A  Rescue Date: N/A  Imaging/ Diagnostics: N/A    Noteworthy Labs:  none    COVID Test: (--)  CBC/Anemia Labs: Coags:    Recent Labs   Lab 01/11/24  0021 01/12/24  0635   WBC 20.83* 12.29   HGB 11.2* 9.2*   HCT 36.7* 30.8*    177   MCV 96 97   RDW 15.1* 15.3*    Recent Labs   Lab 01/11/24  0855 01/11/24  1557 01/12/24  1339 01/12/24  1502   INR 1.1  --   --   --    APTT 26.3   < > 128.2* 32.4*    < > = values in this interval not displayed.        Chemistries:   Recent Labs   Lab 01/11/24  0021 01/12/24  0635    134*   K 4.9 3.6   CL 94* 94*   CO2 30* 31*   BUN 26* 25*   CREATININE 0.9 0.9   CALCIUM 9.5 8.9   PROT 7.6 6.6   BILITOT 0.7 0.5   ALKPHOS 117 76   ALT 18 29   AST 24 55*   MG 2.1 2.1   PHOS  --  3.0        Cardiac Enzymes: Ejection Fractions:    Recent Labs     01/11/24  0311 01/11/24  0855   TROPONINI 1.011* 3.344*    EF   Date Value Ref Range Status   03/20/2023 60 % Final        POCT Glucose: HbA1c:    Recent Labs   Lab 01/12/24  0609 01/12/24  1134 01/12/24  1818   POCTGLUCOSE 192* 104 104    Hemoglobin A1C   Date Value Ref Range Status   08/29/2023 5.8 (H) 4.0 - 5.6 % Final     Comment:     ADA Screening Guidelines:  5.7-6.4%  Consistent with prediabetes  >or=6.5%  Consistent with diabetes    High levels of fetal hemoglobin interfere with the HbA1C  assay. Heterozygous hemoglobin variants (HbS, HgC, etc)do  not significantly interfere with this assay.   However, presence of multiple variants may affect accuracy.             ICU LOS 1d 15h  Level of Care: Critical Care and Discharge    Chart Check: 12 HR Done  Shift Summary/Plan for the shift: none

## 2024-01-13 NOTE — PLAN OF CARE
Pt remains in ICU. VSS. No acute events overnight. Has transfer orders.    Problem: Adult Inpatient Plan of Care  Goal: Plan of Care Review  Outcome: Ongoing, Progressing  Goal: Patient-Specific Goal (Individualized)  Outcome: Ongoing, Progressing  Goal: Absence of Hospital-Acquired Illness or Injury  Outcome: Ongoing, Progressing  Goal: Optimal Comfort and Wellbeing  Outcome: Ongoing, Progressing  Goal: Readiness for Transition of Care  Outcome: Ongoing, Progressing

## 2024-01-13 NOTE — NURSING
[facility] Intensive Care  ICU Shift Summary  Date: 1/13/2024      Prehospitalization: Home  Admit Date / LOS : 1/11/2024/ 2 days    Diagnosis: Acute on chronic respiratory failure with hypoxia    Consults:        Active: Cardio, Palliative, and SW       Needed: N/A     Code Status: Full Code   Advanced Directive: Not Received    LDA:  Lines/Drains/Airways       Drain  Duration                  Gastrostomy/Enterostomy 01/04/22 Percutaneous endoscopic gastrostomy (PEG)  days              Airway  Duration             Adult Surgical Airway 03/16/23 1014 Shiley Cuffed 6.0 303 days              Peripheral Intravenous Line  Duration                  Peripheral IV - Single Lumen 01/10/24 2341 18 G Left;Posterior Forearm 2 days         Peripheral IV - Single Lumen 01/11/24 0013 20 G Right Antecubital 2 days                  Central Lines/Site/Justification:Patient Does Not Have Central Line  Urinary Cath/Order/Justification:Patient Does Not Have Urinary Catheter    Vasopressors/Infusions:        GOALS: Volume/ Hemodynamic: N/A                     RASS: 0  alert and calm    Pain Management: none       Pain Controlled: yes     Rhythm: NSR      Oxygen Concentration (%):  [40-80] 80                 Most Recent SBT/ SAT: N/A       MOVE Screen: FAIL  ICU Liberation: yes    VTE Prophylaxis: Pharm  Mobility: Bedrest  Stress Ulcer Prophylaxis: Yes    Isolation: No active isolations    Dietary:   Current Diet Order   Procedures    Diet NPO Except for: Sips with Medication     Order Specific Question:   Except for     Answer:   Sips with Medication      Tolerance: yes  Advancement: no    I & O (24h):    Intake/Output Summary (Last 24 hours) at 1/13/2024 1740  Last data filed at 1/13/2024 1300  Gross per 24 hour   Intake 1363.24 ml   Output 725 ml   Net 638.24 ml        Restraints: No    Significant Dates:  Post Op Date: N/A  Rescue Date: N/A  Imaging/ Diagnostics: N/A    Noteworthy Labs:  see on table    COVID Test:  (--)  CBC/Anemia Labs: Coags:    Recent Labs   Lab 01/12/24  0635 01/13/24  0241   WBC 12.29 9.56   HGB 9.2* 9.5*   HCT 30.8* 30.9*    172   MCV 97 95   RDW 15.3* 15.2*    Recent Labs   Lab 01/11/24  0855 01/11/24  1557 01/13/24  0534 01/13/24  1255   INR 1.1  --   --   --    APTT 26.3   < > 35.7* 27.8    < > = values in this interval not displayed.        Chemistries:   Recent Labs   Lab 01/12/24  0635 01/13/24  0241   * 133*   K 3.6 3.0*   CL 94* 88*   CO2 31* 31*   BUN 25* 22   CREATININE 0.9 1.0   CALCIUM 8.9 8.7   PROT 6.6 6.5   BILITOT 0.5 0.5   ALKPHOS 76 74   ALT 29 33   AST 55* 38   MG 2.1 1.7   PHOS 3.0  --         Cardiac Enzymes: Ejection Fractions:    Recent Labs     01/11/24  0311 01/11/24  0855   TROPONINI 1.011* 3.344*    EF   Date Value Ref Range Status   03/20/2023 60 % Final        POCT Glucose: HbA1c:    Recent Labs   Lab 01/13/24  0547 01/13/24  1209 01/13/24  1713   POCTGLUCOSE 179* 112* 120*    Hemoglobin A1C   Date Value Ref Range Status   08/29/2023 5.8 (H) 4.0 - 5.6 % Final     Comment:     ADA Screening Guidelines:  5.7-6.4%  Consistent with prediabetes  >or=6.5%  Consistent with diabetes    High levels of fetal hemoglobin interfere with the HbA1C  assay. Heterozygous hemoglobin variants (HbS, HgC, etc)do  not significantly interfere with this assay.   However, presence of multiple variants may affect accuracy.             ICU LOS 2d 14h  Level of Care: Critical Care    Chart Check: 12 HR Done  Shift Summary/Plan for the shift: none

## 2024-01-13 NOTE — NURSING
[facility] [place]  Nurses Note -- 4 Eyes      1/12/2024       Skin assessed on: Q Shift      [x] No Pressure Injuries Present    [x]Prevention Measures Documented    [] Yes LDA  for Pressure Injury Previously documented     [] Yes New Pressure Injury Discovered   [] LDA for New Pressure Injury Added      Attending RN:  Yadi Fenton RN     Second RN:  KAMILAH Cross

## 2024-01-13 NOTE — PROGRESS NOTES
[facility] Intensive Care  Cardiology  Progress Note    Patient Name: Fareed Richard Jr.  MRN: [id number]  Admission Date: 1/11/2024  Hospital Length of Stay: 2 days  Code Status: Full Code   Attending Physician: Andry Zhao MD   Primary Care Physician: Kodi Tubbs MD  Expected Discharge Date: 1/16/2024  Principal Problem:Acute on chronic respiratory failure with hypoxia    Subjective:       Interval History:  No chest pains.    Past Medical History:   Diagnosis Date    Cancer     skin to Rt forearm and face    COPD (chronic obstructive pulmonary disease)     Hyperlipidemia     Hypertension     Pseudoaneurysm        Past Surgical History:   Procedure Laterality Date    ABDOMINAL SURGERY      stents placed in liver and large intestines, per patient    ANGIOGRAPHY OF AORTIC ARCH  3/27/2023    Procedure: Aortogram, Aortic Arch;  Surgeon: Tim Bhatt MD;  Location: [facility] CATH LAB;  Service: Cardiology;;    AORTOGRAPHY WITH SERIALOGRAPHY N/A 3/27/2023    Procedure: AORTOGRAM, WITH SERIALOGRAPHY;  Surgeon: Tim Bhatt MD;  Location: [facility] CATH LAB;  Service: Cardiology;  Laterality: N/A;    CAROTID STENT      COLONOSCOPY N/A 09/27/2022    Procedure: COLONOSCOPY;  Surgeon: Donnie Peterson MD;  [facility]  Service: Endoscopy;  Laterality: N/A;    COLONOSCOPY N/A 09/30/2022    Procedure: COLONOSCOPY;  Surgeon: Joy Cabrera MD;  Location: [facility] ENDO (2ND FLR);  Service: Endoscopy;  Laterality: N/A;    CORONARY STENT PLACEMENT  01/2000    DISSECTION OF NECK Bilateral 01/04/2022    Procedure: DISSECTION, NECK;  Surgeon: Naeem Smalls MD;  Location: [facility] OR 2ND FLR;  Service: ENT;  Laterality: Bilateral;    ESOPHAGOGASTRODUODENOSCOPY N/A 09/27/2022    Procedure: EGD (ESOPHAGOGASTRODUODENOSCOPY);  Surgeon: Donnie Peterson MD;  [facility]  Service: Endoscopy;  Laterality: N/A;    ESOPHAGOGASTRODUODENOSCOPY N/A 09/30/2022    Procedure: EGD (ESOPHAGOGASTRODUODENOSCOPY);   Surgeon: Joy Cabrera MD;  Location: [facility] ENDO (2ND FLR);  Service: Endoscopy;  Laterality: N/A;    ESOPHAGOGASTRODUODENOSCOPY W/ PEG N/A 01/04/2022    Procedure: EGD, WITH PEG TUBE INSERTION;  Surgeon: Anthony Calabrese MD;  [facility];  Service: General;  Laterality: N/A;    EYE SURGERY      Cataract bilateral    femoral stents      bilateral    FLAP PROCEDURE N/A 01/03/2022    Procedure: CREATION, FREE FLAP;  Surgeon: Naeem Smalls MD;  [facility];  Service: ENT;  Laterality: N/A;    FLAP PROCEDURE Right 01/04/2022    Procedure: CREATION, FREE FLAP;  Surgeon: Stacey Conde MD;  [facility];  Service: ENT;  Laterality: Right;  Ischemic start 1203  Ischemic stop 1353    GLOSSECTOMY N/A 01/04/2022    Procedure: GLOSSECTOMY;  Surgeon: Naeem Smalls MD;  [facility];  Service: ENT;  Laterality: N/A;    LEFT HEART CATHETERIZATION Left 3/23/2023    Procedure: Left heart cath;  Surgeon: Tacos Benitez MD;  Location: [facility] CATH LAB;  Service: Cardiology;  Laterality: Left;    PERCUTANEOUS TRANSLUMINAL BALLOON ANGIOPLASTY OF CORONARY ARTERY Left 3/27/2023    Procedure: Angioplasty-coronary;  Surgeon: Tim Bhatt MD;  Location: [facility] CATH LAB;  Service: Cardiology;  Laterality: Left;  not before 9am, R rad access, 6 Fr EBU 3.5 guide    RADICAL NECK DISSECTION Left 01/03/2022    Procedure: DISSECTION, NECK, RADICAL;  Surgeon: Naeem Smalls MD;  [facility];  Service: ENT;  Laterality: Left;    SKIN BIOPSY      SKIN CANCER EXCISION      STENT, CORONARY, MULTI VESSEL  3/27/2023    Procedure: Stent, Coronary, Multi Vessel;  Surgeon: Tim Bhatt MD;  Location: [facility] CATH LAB;  Service: Cardiology;;    TRACHEOTOMY N/A 01/04/2022    Procedure: TRACHEOTOMY;  Surgeon: Naeem Smalls MD;  [facility];  Service: ENT;  Laterality: N/A;    VASCULAR SURGERY         Review of patient's allergies indicates:    Allergen Reactions    Ativan [lorazepam] Anxiety       No current facility-administered medications on file prior to encounter.     Current Outpatient Medications on File Prior to Encounter   Medication Sig    albuterol-ipratropium (DUO-NEB) 2.5 mg-0.5 mg/3 mL nebulizer solution Take 3 mLs by nebulization every 4 (four) hours as needed for Wheezing. Rescue    ascorbic acid, vitamin C, (VITAMIN C) 100 MG tablet Take 100 mg by mouth once daily.    aspirin 81 MG Chew Take 1 tablet (81 mg total) by mouth once daily.    atorvastatin (LIPITOR) 40 MG tablet 1 tablet (40 mg total) by Per G Tube route every evening. (Patient taking differently: 40 mg by Per G Tube route once daily.)    bisacodyL (DULCOLAX) 10 mg Supp Place 1 suppository (10 mg total) rectally daily as needed.    clopidogreL (PLAVIX) 75 mg tablet Take 1 tablet (75 mg total) by mouth once daily. (Patient taking differently: Take 75 mg by mouth nightly.)    ferrous sulfate 325 (65 FE) MG EC tablet Take 325 mg by mouth 3 (three) times daily with meals.    FLUoxetine (PROZAC) 20 mg/5 mL (4 mg/mL) solution Take 20 mg by mouth once daily.    folic acid (FOLVITE) 1 MG tablet Take 1 mg by mouth once daily.    furosemide (LASIX) 40 MG tablet Take 0.5 tablets (20 mg total) by mouth once daily. (Patient taking differently: Take 20 mg by mouth nightly.)    glycopyrrolate (CUVPOSA) 1 mg/5 mL (0.2 mg/mL) Soln Take 5 mLs (1 mg total) by mouth 3 (three) times daily as needed (TO REDUCE SECRETIONS).    hydrOXYzine HCL (ATARAX) 25 MG tablet SMARTSI.5 Tablet(s) Gastro Tube Every 8 Hours PRN    melatonin (MELATIN) 3 mg tablet 1 tablet (3 mg total) by Per G Tube route nightly.    metoprolol tartrate (LOPRESSOR) 25 MG tablet Take 0.5 tablets (12.5 mg total) by mouth 2 (two) times daily.    omeprazole (PRILOSEC) 40 MG capsule Take 40 mg (contents of one capsule) twice daily through PEG tube. Mix contents of capsule with 10 mL applesauce. (Patient taking differently: 40 mg 2  (two) times a day. Take 40 mg (contents of one capsule) twice daily through PEG tube. Mix contents of capsule with 10 mL applesauce.)    polyethylene glycol (GLYCOLAX) 17 gram PwPk 17 g by Per G Tube route 2 (two) times daily.    sodium chloride 3% 3 % nebulizer solution Take 4 mLs by nebulization 2 (two) times a day.    thiamine (VITAMIN B-1) 50 MG tablet Take 50 mg by mouth once daily.    WIXELA INHUB 250-50 mcg/dose diskus inhaler SMARTSI Puff(s) By Mouth Every 12 Hours    [DISCONTINUED] losartan (COZAAR) 50 MG tablet 1 tablet (50 mg total) by Per G Tube route once daily.     Family History    None       Tobacco Use    Smoking status: Former     Current packs/day: 0.00     Average packs/day: 2.0 packs/day for 55.0 years (110.0 ttl pk-yrs)     Types: Cigarettes     Start date: 1963     Quit date: 2018     Years since quittin.7    Smokeless tobacco: Never    Tobacco comments:     3/3 ppd x 40 yrs. Currently 3-4 cigarettes daily .He is trying  to quit. Is using a Vapor cigarettes  2-3 x's a day.   Substance and Sexual Activity    Alcohol use: Not Currently    Drug use: No    Sexual activity: Not Currently     Review of Systems   Constitutional:  Positive for fatigue and fever.   HENT:  Negative for congestion.    Eyes:  Negative for discharge.   Respiratory:  Positive for cough and shortness of breath. Negative for chest tightness.    Cardiovascular:  Negative for chest pain and leg swelling.   Gastrointestinal:  Negative for abdominal pain.   Endocrine: Negative for polyphagia.   Genitourinary:  Negative for difficulty urinating.   Musculoskeletal:  Positive for arthralgias.   Skin:  Negative for color change.   Allergic/Immunologic: Negative for food allergies.   Neurological:  Negative for headaches.   Psychiatric/Behavioral:  Negative for confusion.      Objective:     Vital Signs (Most Recent):  Temp: 98.4 °F (36.9 °C) (24 1101)  Pulse: 107 (24 1300)  Resp: (!) 28 (24  1300)  BP: 114/86 (01/13/24 1300)  SpO2: 95 % (01/13/24 1300) Vital Signs (24h Range):  Temp:  [98.3 °F (36.8 °C)-99 °F (37.2 °C)] 98.4 °F (36.9 °C)  Pulse:  [] 107  Resp:  [20-43] 28  SpO2:  [89 %-100 %] 95 %  BP: (105-141)/(67-86) 114/86     Weight: 59.9 kg (132 lb)  Body mass index is 19.49 kg/m².     Physical Exam  Vitals and nursing note reviewed.   Constitutional:       General: He is not in acute distress.     Appearance: He is ill-appearing (chronically). He is not toxic-appearing.   HENT:      Head: Normocephalic and atraumatic.      Mouth/Throat:      Mouth: Mucous membranes are moist.   Cardiovascular:      Rate and Rhythm: Normal rate and regular rhythm.   Pulmonary:      Effort: Pulmonary effort is normal.      Breath sounds: Normal breath sounds. No wheezing, rhonchi or rales.      Comments: 10L trach collar  Abdominal:      General: Bowel sounds are normal. There is no distension.      Palpations: Abdomen is soft. There is no mass.      Tenderness: There is no abdominal tenderness. There is no guarding.      Comments: PEG in place   Musculoskeletal:      Right lower leg: No edema.      Left lower leg: No edema.   Skin:     General: Skin is warm and dry.   Neurological:      Mental Status: He is alert. Mental status is at baseline.                  DATA:     Laboratory:  CBC:  Recent Labs   Lab 01/11/24  0021 01/12/24  0635 01/13/24  0241   WBC 20.83 H 12.29 9.56   Hemoglobin 11.2 L 9.2 L 9.5 L   Hematocrit 36.7 L 30.8 L 30.9 L   Platelets 244 177 172         CHEMISTRIES:  Recent Labs   Lab 05/12/22  0343 05/14/22  0724 05/17/22  1007 08/24/22  1000 01/11/24  0021 01/12/24  0635 01/13/24  0241   Glucose 110 96 162 H   < > 206 H 167 H 328 H   Sodium 135 L 136 133 L   < > 139 134 L 133 L   Potassium 4.4 4.1 5.0   < > 4.9 3.6 3.0 L   BUN 24 H 18 15   < > 26 H 25 H 22   Creatinine 0.7 0.7 0.7   < > 0.9 0.9 1.0   eGFR if African American >60.0 >60 >60.0  --   --   --   --    eGFR if non   American >60.0 >60 >60.0  --   --   --   --    Calcium 9.1 9.6 9.5   < > 9.5 8.9 8.7   Magnesium 2.0  --  2.0   < > 2.1 2.1 1.7    < > = values in this interval not displayed.         CARDIAC BIOMARKERS:  Recent Labs   Lab 01/11/24  0021 01/11/24  0311 01/11/24  0855   Troponin I 0.393 H 1.011 H 3.344 H         COAGS:  Recent Labs   Lab 04/02/23  0322 11/26/23  0505 01/11/24  0855   INR 1.0 1.1 1.1         LIPIDS/LFTS:  Recent Labs   Lab 08/29/23  1522 10/20/23  0815 01/11/24  0021 01/12/24  0635 01/13/24  0241   Cholesterol 85 L  --   --  95 L  --    Triglycerides 68  --   --  60  --    HDL 25 L  --   --  37 L  --    LDL Cholesterol 46.4 L  --   --  46.0 L  --    Non-HDL Cholesterol 60  --   --  58  --    AST 20   < > 24 55 H 38   ALT 28   < > 18 29 33    < > = values in this interval not displayed.         Hemoglobin A1C   Date Value Ref Range Status   08/29/2023 5.8 (H) 4.0 - 5.6 % Final     Comment:     ADA Screening Guidelines:  5.7-6.4%  Consistent with prediabetes  >or=6.5%  Consistent with diabetes    High levels of fetal hemoglobin interfere with the HbA1C  assay. Heterozygous hemoglobin variants (HbS, HgC, etc)do  not significantly interfere with this assay.   However, presence of multiple variants may affect accuracy.     09/27/2022 6.3 (H) 4.0 - 5.6 % Final     Comment:     ADA Screening Guidelines:  5.7-6.4%  Consistent with prediabetes  >or=6.5%  Consistent with diabetes    High levels of fetal hemoglobin interfere with the HbA1C  assay. Heterozygous hemoglobin variants (HbS, HgC, etc)do  not significantly interfere with this assay.   However, presence of multiple variants may affect accuracy.         TSH  Recent Labs   Lab 05/23/23  0849 07/10/23  1040 08/29/23  1522   TSH 4.379 H 3.610 4.460 H         The ASCVD Risk score (Caitlyn DUPONT, et al., 2019) failed to calculate for the following reasons:    The patient has a prior MI or stroke diagnosis       BNP    Lab Results   Component Value Date/Time     BNP 2,467 (H) 01/11/2024 12:21 AM    BNP 1,708 (H) 11/20/2023 04:06 PM     (H) 10/20/2023 08:15 AM    BNP 1,185 (H) 04/18/2023 07:07 AM    BNP 2,543 (H) 03/21/2023 03:25 PM     (H) 03/18/2023 11:06 AM     (H) 09/23/2022 12:47 PM     (H) 09/14/2022 04:59 AM    BNP 98 05/14/2022 07:25 AM     (H) 04/22/2022 05:04 PM    BNP 1,607 (H) 04/19/2022 09:29 AM     (H) 03/21/2022 02:39 PM     (H) 11/02/2019 02:52 AM     (H) 12/27/2018 01:29 PM            ECHO    Results for orders placed during the hospital encounter of 01/11/24    Echo    Interpretation Summary    Left Ventricle: The left ventricle is normal in size. Mildly increased wall thickness. There is concentric hypertrophy. Normal wall motion. There is low normal systolic function with a visually estimated ejection fraction of 50 - 55%. Unable to assess diastolic function due to E-A fusion.    Right Ventricle: Normal right ventricular cavity size. Systolic function is normal.    Left Atrium: Left atrium is moderately dilated.    Right Atrium: Right atrium is mildly dilated.    Mitral Valve: There is mild regurgitation.    Tricuspid Valve: There is mild regurgitation.    Pulmonary Artery: The estimated pulmonary artery systolic pressure is 13 mmHg.    IVC/SVC: Normal venous pressure at 3 mmHg.      STRESS TEST    No results found for this or any previous visit.        CATH    Results for orders placed during the hospital encounter of 03/18/23    Cardiac catheterization    Conclusion  Description of the findings of the procedure: uneventful LHC/cor angio/Ao angio/iliac angio/PCI prox LAD BRADLEY x1, PCI mid LCx BRADLEY x1/R rad vasband/RFA man comp.    Findings/Key Components:  LVEDP: 3mmHg  LVEF: 60%    Aortic arch: severe calcific atherosclerosis  Ost innominate artery 80%  Prox R SCA 90%, 53mmHg gradient across stenoses.  Prox-mid ICA patent  Distal aortoiliac bifurcation with patent stents in ЮЛИЯ bilaterally and  possible severe ISR in R ЮЛИЯ.    Severe diffuse cor Ca++  Dominance: Right  LM: MLI  LAD: prox 95% at D1, mid  Ramus: MLI  LCx: mid 90%  RCA: not injected, diffuse disease, severe dist RCA stenosis (see Dr. Benitez cath report    PCI prox LAD:  Preop ASA/Plavix, intraop heparin  Wired LAD/D1  Predil 2.5x12, 2.75x15 NC  Stent 3.0x24 Synergy BRADLEY  Post IVUS with mild underexpansion mid stent  Postdil 3.25x20 NC at 22atm  Excellent angiographic result, T3 flow, 0% residual stenosis    PCI mid LCx  Predil 2.5x12  Stent 2.75x20 Synergy BRADLEY 14 fidelia  Unavble to pass IVUS due to severe prox LCx tortuosity  Excellent angiographic result, T3 flow, 0% residual stenosis    Hemostasis:  R Radial band  RFA man comp  Assessment and Plan:     Brief HPI:     * Acute on chronic respiratory failure with hypoxia        Pneumonia        NSTEMI (non-ST elevated myocardial infarction)  TYPE 1 VERSUS TYPE 2.  PATIENT IS CHEST PAIN-FREE.  STATES THAT PRIOR TO HIS PCI WHEN HE HAD THE NON-STEMI LAST TIME, HE DID HAVE CHEST PAINS.  EKG DOES SHOW ST DEPRESSIONS.  CASE DISCUSSED IN DETAIL WITH THE DR. TAMAYO WHO IS THE PATIENT'S PRIMARY CARDIOLOGIST.  LAST PCI WAS COMPLICATED BY PULMONARY AND RETROPERITONEAL HEMORRHAGE.  WHEN THE PATIENT SAW DR. TAMAYO IN CLINIC AS AN OUTPATIENT, DR. TAMAYO HAD RECOMMENDED PALLIATIVE CARE CONSULT.  TODAY WITH ME AND DR. TAMAYO HAD A DETAILED DISCUSSION AND CONSIDERING HIS POOR STATE OF HEALTH, MULTIPLE COMORBIDITIES, THE FACT THAT HE IS CHEST PAIN-FREE AND THE FACT THAT HE HAD MULTIPLE COMPLICATIONS CHRONIC PULMONARY HEMORRHAGE AS WELL AS RETROPERITONEAL HEMORRHAGE AFTER HIS LAST CATHETERIZATION AND PCI BY DR. WEST, WE RECOMMEND MEDICAL MANAGEMENT AND PALLIATIVE CARE CONSULT AT THE CURRENT TIME.  HE IS CURRENTLY COMFORTABLE AND CHEST PAIN-FREE.  CONTINUE MEDICAL MANAGEMENT.  IF DEVELOPS LIFESTYLE LIMITING ANGINA, THEN DISCUSSION OF HIGH-RISK PCI WILL BE HELD.    January 12th:  Continues to be chest  pain-free.  Continue medical management    January 13th: Continues to be chest pain-free.  Continue medical management.    PEG (percutaneous endoscopic gastrostomy) status        Tracheostomy present        Severe protein-calorie malnutrition          Other hyperlipidemia        Coronary artery disease involving native coronary artery of native heart without angina pectoris  ALTHOUGH PATIENT'S TROPONINS ELEVATED, HE IS CHEST PAIN-FREE.  STATES THAT PRIOR TO HIS STENTS IN MARCH HE HAD CHEST PAINS AND CURRENTLY IS CHEST PAIN-FREE.  IT IS POSSIBLE THAT HIS TROPONIN COULD BE ELEVATED SECONDARY TO HIS HYPOXEMIA ON PRESENTATION.  HOWEVER DOES HAVE LATERAL ST DEPRESSIONS AND ISCHEMIC LOOKING EKG.    1/12 has continued to be chest pain-free.    Peripheral vascular disease              VTE Risk Mitigation (From admission, onward)           Ordered     heparin 25,000 units in dextrose 5% 250 mL (100 units/mL) infusion LOW INTENSITY nomogram - [facility]  Continuous        Question:  Begin at (units/kg/hr)  Answer:  12    01/13/24 0819     IP VTE LOW RISK PATIENT  Once         01/11/24 1648     heparin 25,000 units in dextrose 5% (100 units/ml) IV bolus from bag - ADDITIONAL PRN BOLUS - 60 units/kg (max bolus 4000 units)  As needed (PRN)        Question:  Heparin Infusion Adjustment (DO NOT MODIFY ANSWER)  Answer:  \\[email or web address]\[email or web address]\Images\Pharmacy\HeparinInfusions\heparin LOW INTENSITY nomogram for [facility] [id number].pdf    01/11/24 0823     heparin 25,000 units in dextrose 5% (100 units/ml) IV bolus from bag - ADDITIONAL PRN BOLUS - 30 units/kg (max bolus 4000 units)  As needed (PRN)        Question:  Heparin Infusion Adjustment (DO NOT MODIFY ANSWER)  Answer:  [email or web address]\Images\Pharmacy\HeparinInfusions\heparin LOW INTENSITY nomogram for [facility] [id number].pdf    01/11/24 0823     Place sequential compression device  Until discontinued         01/11/24 0225                    Arturo Malone MD  Cardiology  [facility] [place] - Intensive  Care    Critical Care Time:  35 minutes     Critical care was time spent personally by me on the following activities: development of treatment plan with patient or surrogate and bedside caregivers, discussions with consultants, evaluation of patient's response to treatment, examination of patient, ordering and performing treatments and interventions, ordering and review of laboratory studies, ordering and review of radiographic studies, pulse oximetry, re-evaluation of patient's condition. This critical care time did not overlap with that of any other provider or involve time for any procedures.

## 2024-01-13 NOTE — PROGRESS NOTES
Pharmacokinetic Assessment Follow Up: IV Vancomycin    Vancomycin serum concentration assessment(s):    The trough level was drawn correctly and can be used to guide therapy at this time. The measurement is within the desired definitive target range of 10 to 20 mcg/mL.    Vancomycin Regimen Plan:    Continue regimen to Vancomycin 1750 mg IV every 24 hours with next serum trough concentration measured at 0230 prior to third dose on 1/15    Drug levels (last 3 results):  Recent Labs   Lab Result Units 01/13/24  0241   Vancomycin-Trough ug/mL 17.3       Pharmacy will continue to follow and monitor vancomycin.    Please contact pharmacy at extension [phone] for questions regarding this assessment.    Thank you for the consult,   Héctor Altamirano       Patient brief summary:  Fareed Richard Jr. is a 73 y.o. male initiated on antimicrobial therapy with IV Vancomycin for treatment of lower respiratory infection    Drug Allergies:   Review of patient's allergies indicates:   Allergen Reactions    Ativan [lorazepam] Anxiety       Actual Body Weight:   59.9 kg    Renal Function:   Estimated Creatinine Clearance: 54.9 mL/min (based on SCr of 1 mg/dL).,     Dialysis Method (if applicable):  N/A    CBC (last 72 hours):  Recent Labs   Lab Result Units 01/11/24  0021 01/12/24  0635 01/13/24  0241   WBC K/uL 20.83* 12.29 9.56   Hemoglobin g/dL 11.2* 9.2* 9.5*   Hematocrit % 36.7* 30.8* 30.9*   Platelets K/uL 244 177 172   Gran % % 89.0* 87.6* 82.7*   Lymph % % 2.9* 3.8* 4.6*   Mono % % 7.3 7.8 12.0   Eosinophil % % 0.2 0.1 0.1   Basophil % % 0.2 0.2 0.2   Differential Method  Automated Automated Automated       Metabolic Panel (last 72 hours):  Recent Labs   Lab Result Units 01/11/24  0021 01/12/24  0635 01/13/24  0241   Sodium mmol/L 139 134* 133*   Potassium mmol/L 4.9 3.6 3.0*   Chloride mmol/L 94* 94* 88*   CO2 mmol/L 30* 31* 31*   Glucose mg/dL 206* 167* 328*   BUN mg/dL 26* 25* 22   Creatinine mg/dL 0.9 0.9 1.0   Albumin  g/dL 3.3* 2.7* 2.6*   Total Bilirubin mg/dL 0.7 0.5 0.5   Alkaline Phosphatase U/L 117 76 74   AST U/L 24 55* 38   ALT U/L 18 29 33   Magnesium mg/dL 2.1 2.1 1.7   Phosphorus mg/dL  --  3.0  --        Vancomycin Administrations:  vancomycin given in the last 96 hours                     vancomycin (VANCOCIN) 1,750 mg in dextrose 5 % (D5W) 500 mL IVPB (mg) 1,750 mg New Bag 01/12/24 0429    vancomycin 1,250 mg in dextrose 5 % (D5W) 250 mL IVPB (Vial-Mate) (mg) 1,250 mg New Bag 01/11/24 0335                    Microbiologic Results:  Microbiology Results (last 7 days)       Procedure Component Value Units Date/Time    Blood Culture #1 **CANNOT BE ORDERED STAT** [[id number]] Collected: 01/11/24 0116    Order Status: Completed Specimen: Blood from Peripheral, Forearm, Right Updated: 01/13/24 0303     Blood Culture, Routine No Growth to date      No Growth to date      No Growth to date    Blood culture [[id number]] Collected: 01/11/24 0144    Order Status: Completed Specimen: Blood from Peripheral, Lower Arm, Left Updated: 01/13/24 0303     Blood Culture, Routine No Growth to date      No Growth to date      No Growth to date    Culture, Respiratory with Gram Stain [[id number]]  (Abnormal) Collected: 01/11/24 0729    Order Status: Completed Specimen: Respiratory from Tracheal Aspirate Updated: 01/12/24 1029     Respiratory Culture PRESUMPTIVE PSEUDOMONAS SPECIES  Many       Gram Stain (Respiratory) Moderate WBC's     Gram Stain (Respiratory) Moderate Gram negative rods     Gram Stain (Respiratory) Few Gram positive cocci in pairs     Gram Stain (Respiratory) Rare Gram positive rods    Culture, Respiratory with Gram Stain [[id number]]  (Abnormal) Collected: 01/11/24 0053    Order Status: Completed Specimen: Sputum Updated: 01/12/24 1025     Respiratory Culture PRESUMPTIVE PSEUDOMONAS SPECIES  Many  Identification and susceptibility pending       Gram Stain (Respiratory) <10 epithelial cells per low power field.     Gram  Stain (Respiratory) Moderate WBC's     Gram Stain (Respiratory) Moderate Gram negative rods

## 2024-01-13 NOTE — PROGRESS NOTES
[facility] Medicine  Progress Note    Patient Name: Fareed Richard Jr.  MRN: [id number]  Patient Class: IP- Inpatient   Admission Date: 1/11/2024  Length of Stay: 2 days  Attending Physician: Andry Zhao MD  Primary Care Provider: Kodi Tubbs MD        Subjective:     Principal Problem:Acute on chronic respiratory failure with hypoxia        HPI:  Mr Fareed Richard Jr. 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 He is on 6L trach collar at home and receives tube feeding. He had shortness of breath, fever, and increased trach output. Denies chest pain.     In ED, he was tachycardic and tachypneic with SpO2 85% on 8L. He was given albuterol, vanc/zosyn, solumedrol. He was admitted to ICU.     Overview/Hospital Course:  Mr Fareed Richard Jr. is a 73 y.o. man who was admitted with acute on chronic hypoxic respiratory failure due to pneumonia, suspect recurrent Pseudomonas. Also with NSTEMI on admit in setting of known CAD. Started vanc, zosyn. Weaned O2. Cardiology consulted- due to severe CAD and complications with last angiogram <1 year ago, will plan medical management with heparin gtt, asa, plavix, and will NOT pursue ischemic evaluation. He is improving. Stepped down to floor.     Persistent Pseudomonas infection, on V+Z, reduced to Cefepime today. Stop Heparin gtt this morning at 48h.         Interval History:  NAEON.  No new issues.   Daughter and Grandson at bedside  All questions answered and updates on care given.       ROS:  General: Negative for fevers or chills.  Cardiac: Negative for chest pain or orthopnea   Pulmonary: Negative for dyspnea or wheezing.  GI: Negative for abdominal distention or pain     Vitals:    01/13/24 0400 01/13/24 0445 01/13/24 0701 01/13/24 0739   BP: 107/78  133/77    BP Location:   Left arm    Patient  Position:   Lying    Pulse: 94 108 98 104   Resp: (!) 39 20 (!) 28 (!) 26   Temp: 98.3 °F (36.8 °C)  98.3 °F (36.8 °C)    TempSrc: Axillary  Axillary    SpO2: (!) 94% 95% 100% 100%   Weight:       Height:              Body mass index is 19.49 kg/m².      Physical Exam  Vitals and nursing note reviewed.   Constitutional:       General: He is not in acute distress.     Appearance: He is ill-appearing (chronically). He is not toxic-appearing.   Neck:      Comments: Trach in place  Cardiovascular:      Rate and Rhythm: Normal rate and regular rhythm.   Pulmonary:      Effort: Pulmonary effort is normal.      Breath sounds: Normal breath sounds.      Comments: 10L/40% trach collar  Abdominal:      General: Bowel sounds are normal.      Palpations: Abdomen is soft.      Comments: PEG   Musculoskeletal:      Right lower leg: No edema.      Left lower leg: No edema.   Skin:     General: Skin is warm and dry.   Neurological:      Mental Status: He is alert. Mental status is at baseline.         Recent Results (from the past 24 hour(s))   POCT glucose    Collection Time: 01/12/24 11:34 AM   Result Value Ref Range    POCT Glucose 104 70 - 110 mg/dL   APTT    Collection Time: 01/12/24  1:39 PM   Result Value Ref Range    aPTT 128.2 (H) 21.0 - 32.0 sec   APTT    Collection Time: 01/12/24  3:02 PM   Result Value Ref Range    aPTT 32.4 (H) 21.0 - 32.0 sec   POCT glucose    Collection Time: 01/12/24  6:18 PM   Result Value Ref Range    POCT Glucose 104 70 - 110 mg/dL   APTT    Collection Time: 01/12/24 10:04 PM   Result Value Ref Range    aPTT 32.0 21.0 - 32.0 sec   POCT glucose    Collection Time: 01/13/24 12:50 AM   Result Value Ref Range    POCT Glucose 125 (H) 70 - 110 mg/dL   VANCOMYCIN, TROUGH    Collection Time: 01/13/24  2:41 AM   Result Value Ref Range    Vancomycin-Trough 17.3 10.0 - 22.0 ug/mL   CBC Auto Differential    Collection Time: 01/13/24  2:41 AM   Result Value Ref Range    WBC 9.56 3.90 - 12.70 K/uL    RBC 3.25  (L) 4.60 - 6.20 M/uL    Hemoglobin 9.5 (L) 14.0 - 18.0 g/dL    Hematocrit 30.9 (L) 40.0 - 54.0 %    MCV 95 82 - 98 fL    MCH 29.2 27.0 - 31.0 pg    MCHC 30.7 (L) 32.0 - 36.0 g/dL    RDW 15.2 (H) 11.5 - 14.5 %    Platelets 172 150 - 450 K/uL    MPV 10.1 9.2 - 12.9 fL    Immature Granulocytes 0.4 0.0 - 0.5 %    Gran # (ANC) 7.9 (H) 1.8 - 7.7 K/uL    Immature Grans (Abs) 0.04 0.00 - 0.04 K/uL    Lymph # 0.4 (L) 1.0 - 4.8 K/uL    Mono # 1.2 (H) 0.3 - 1.0 K/uL    Eos # 0.0 0.0 - 0.5 K/uL    Baso # 0.02 0.00 - 0.20 K/uL    nRBC 0 0 /100 WBC    Gran % 82.7 (H) 38.0 - 73.0 %    Lymph % 4.6 (L) 18.0 - 48.0 %    Mono % 12.0 4.0 - 15.0 %    Eosinophil % 0.1 0.0 - 8.0 %    Basophil % 0.2 0.0 - 1.9 %    Differential Method Automated    Comprehensive Metabolic Panel    Collection Time: 01/13/24  2:41 AM   Result Value Ref Range    Sodium 133 (L) 136 - 145 mmol/L    Potassium 3.0 (L) 3.5 - 5.1 mmol/L    Chloride 88 (L) 95 - 110 mmol/L    CO2 31 (H) 23 - 29 mmol/L    Glucose 328 (H) 70 - 110 mg/dL    BUN 22 8 - 23 mg/dL    Creatinine 1.0 0.5 - 1.4 mg/dL    Calcium 8.7 8.7 - 10.5 mg/dL    Total Protein 6.5 6.0 - 8.4 g/dL    Albumin 2.6 (L) 3.5 - 5.2 g/dL    Total Bilirubin 0.5 0.1 - 1.0 mg/dL    Alkaline Phosphatase 74 55 - 135 U/L    AST 38 10 - 40 U/L    ALT 33 10 - 44 U/L    eGFR >60 >60 mL/min/1.73 m^2    Anion Gap 14 8 - 16 mmol/L   Magnesium    Collection Time: 01/13/24  2:41 AM   Result Value Ref Range    Magnesium 1.7 1.6 - 2.6 mg/dL   APTT    Collection Time: 01/13/24  5:34 AM   Result Value Ref Range    aPTT 35.7 (H) 21.0 - 32.0 sec   POCT glucose    Collection Time: 01/13/24  5:47 AM   Result Value Ref Range    POCT Glucose 179 (H) 70 - 110 mg/dL       Microbiology Results (last 7 days)       Procedure Component Value Units Date/Time    Blood Culture #1 **CANNOT BE ORDERED STAT** [[id number]] Collected: 01/11/24 0116    Order Status: Completed Specimen: Blood from Peripheral, Forearm, Right Updated: 01/13/24 0303      Blood Culture, Routine No Growth to date      No Growth to date      No Growth to date    Blood culture [[id number]] Collected: 01/11/24 0144    Order Status: Completed Specimen: Blood from Peripheral, Lower Arm, Left Updated: 01/13/24 0303     Blood Culture, Routine No Growth to date      No Growth to date      No Growth to date    Culture, Respiratory with Gram Stain [[id number]]  (Abnormal) Collected: 01/11/24 0729    Order Status: Completed Specimen: Respiratory from Tracheal Aspirate Updated: 01/12/24 1029     Respiratory Culture PRESUMPTIVE PSEUDOMONAS SPECIES  Many       Gram Stain (Respiratory) Moderate WBC's     Gram Stain (Respiratory) Moderate Gram negative rods     Gram Stain (Respiratory) Few Gram positive cocci in pairs     Gram Stain (Respiratory) Rare Gram positive rods    Culture, Respiratory with Gram Stain [[id number]]  (Abnormal) Collected: 01/11/24 0053    Order Status: Completed Specimen: Sputum Updated: 01/12/24 1025     Respiratory Culture PRESUMPTIVE PSEUDOMONAS SPECIES  Many  Identification and susceptibility pending       Gram Stain (Respiratory) <10 epithelial cells per low power field.     Gram Stain (Respiratory) Moderate WBC's     Gram Stain (Respiratory) Moderate Gram negative rods             Imaging Results              CTA Chest Non-Coronary (PE Studies) (Final result)  Result time 01/11/24 02:25:27      Final result by Hétcor Miranda MD (01/11/24 02:25:27)                   Impression:      No acute pulmonary thromboemboli.      Patchy areas of consolidation in the right middle lobe, new from prior.  Correlate clinically for pneumonia or aspiration.    Mediastinal lymph node enlargement, similar compared to prior.    Additional findings as above.      Electronically signed by: Héctor Miranda MD  Date:    01/11/2024  Time:    02:25               Narrative:    EXAMINATION:  CTA CHEST NON CORONARY (PE STUDIES)    CLINICAL HISTORY:  Pulmonary embolism (PE) suspected, positive  D-dimer;    TECHNIQUE:  Low dose axial images, sagittal and coronal reformations were obtained from the thoracic inlet to the lung bases following the IV administration of 70 mL of Omnipaque 350.  Contrast timing was optimized to evaluate the pulmonary arteries.  MIP images were performed.    COMPARISON:  05/23/2023.    FINDINGS:    Good contrast bolus opacification of the pulmonary arteries. No acute pulmonary thromboembolism. Mild mediastinal lymph node enlargement, similar compared to 05/23/2023. No pericardial effusion or right pleural effusion.  Minimal left pleural effusion, decreased compared to prior.  Hyperinflated lungs with chronic emphysematous changes similar to prior.  Patchy areas of airspace consolidation present in the right middle lobe, new from prior.  Additional chronic changes in both lungs, similar compared to prior.  Limited images through the upper abdomen demonstrate G-tube and partially imaged bilateral upper pole renal cysts.  Atherosclerotic and ectatic aorta and coronary atherosclerotic calcifications, similar to prior                                       X-Ray Chest AP Portable (Final result)  Result time 01/11/24 01:10:45      Final result by Shaheen Joseph MD (01/11/24 01:10:45)                   Impression:      Abnormal airspace opacification and patchy consolidative change in the right lower lung zone which may be due to aspiration or pneumonia.  Similar chronic left basilar opacity with blunting of the left costophrenic angle.      Electronically signed by: Shaheen Joseph MD  Date:    01/11/2024  Time:    01:10               Narrative:    EXAMINATION:  XR CHEST AP PORTABLE    CLINICAL HISTORY:  Chest Pain;    TECHNIQUE:  Single frontal view of the chest was performed.    COMPARISON:  11/24/2023.    FINDINGS:  Cardiac silhouette is stable in size.  Tracheostomy tube is in similar position.  Lungs are symmetrically expanded.  There is chronic blunting of the left costophrenic  angle which may be due to atelectasis or scarring and/or small left pleural effusion.  There is new abnormal airspace opacification and patchy consolidation seen in the right lower lung zone.  No pneumothorax.  No acute osseous abnormality identified.                                             Assessment/Plan:      * Acute on chronic respiratory failure with hypoxia  Patient admitted with Hypoxic which is Acute on Chronic.  he is on home oxygen at 6 LPM trach collar. Signs/symptoms of respiratory failure include- increased work of breathing and respiratory distress present on admission.   - Labs and images were reviewed. Patient Has not has a recent ABG.   - Supplemental oxygen was provided and noted-  trach collar    - Respiratory failure is due to- CHF and Pneumonia   - CT with RML PNA. Trach aspirate suspected Pseudomonas. Continue vanc, zosyn for now- likely can drop vanc tomorrow   - BNP higher than ever before. TTE normal EF. NSTEMI present. Continue lasix 40mg IV daily- can likely change to PO tomorrow  - CTA no PE  - improving from admit      Pneumonia  RML infiltrate  Trach aspirate with suspect Pseudomonas  - continue vanc, zosyn   - likely DC vanc tomorrow if culture no Staph       Normocytic anemia  Patient's anemia is currently controlled. Has not received any PRBCs to date. Etiology likely d/t  chronic disease  Current CBC reviewed-   Lab Results   Component Value Date    HGB 9.2 (L) 01/12/2024    HCT 30.8 (L) 01/12/2024     Monitor serial CBC and transfuse if patient becomes hemodynamically unstable, symptomatic or H/H drops below 7/21.    Acute on chronic diastolic heart failure  Patient admitted with shortness of breath. His BNP is higher than ever before despite him appearing euvolemic.      BNP  Recent Labs   Lab 01/11/24  0021   BNP 2,467*       CT RML infiltrate  Recent Labs   Lab 01/11/24  0855   TROPONINI 3.344*       EKG not able to see in MUSE- will need to find. Per Cardiology, no  STEMI    Continue 40mg IV lasix QD. Monitor UOP. Likely change to PO lasix tomorrow  TTE normal EF, potential diastolic dysfunction  Cardiology consulted for NSTEMI- no ischemic eval planned         NSTEMI (non-ST elevated myocardial infarction)  Admitted with NSTEMI. No chest pain.  Recent Labs   Lab 01/11/24  0855   TROPONINI 3.344*       EKG with ischemic changes   He has know CAD  Started asa, plavix, heparin gtt, statin.   TTE EF 50-55%  Cardiology consulted- no plans for ischemic evaluation given complications with C <1 year ago        PEG (percutaneous endoscopic gastrostomy) status  Patient noted to have a percutaneous endoscopic gastrostomy tube in place. I have personally inspected the tube.Tube was placed prior to this admission There are no signs of drainage or infection around the site. The tube is patent. Medications have converted to liquid form if available.  Routine care to be done by wound care and nursing staff.   - resumed tube feeds         Tracheostomy present  Noted      ACP (advance care planning)  Advance Care Planning    Date: 01/11/2024  Full code status          Severe protein-calorie malnutrition  Nutrition consulted. Most recent weight and BMI monitored-     Measurements:  Wt Readings from Last 1 Encounters:   01/11/24 59.9 kg (132 lb)   Body mass index is 19.49 kg/m².    Patient has been screened and assessed by RD.    Interventions/Recommendations (treatment strategy):     - continue tube feeds    Other hyperlipidemia  Continue statin      Primary hypertension  Chronic, controlled. Latest blood pressure and vitals reviewed-     Temp:  [96.7 °F (35.9 °C)-98.3 °F (36.8 °C)]   Pulse:  []   Resp:  [19-39]   BP: ()/(61-87)   SpO2:  [95 %-100 %] .   Home meds for hypertension were reviewed and noted below.   Hypertension Medications               furosemide (LASIX) 40 MG tablet Take 0.5 tablets (20 mg total) by mouth once daily.    metoprolol tartrate (LOPRESSOR) 25 MG tablet  Take 0.5 tablets (12.5 mg total) by mouth 2 (two) times daily.          - hold BP Rx because BP is borderline low     Will utilize p.r.n. blood pressure medication only if patient's blood pressure greater than 180/110 and he develops symptoms such as worsening chest pain or shortness of breath.    Coronary artery disease involving native coronary artery of native heart without angina pectoris  Patient with known CAD s/p stent placement, which is uncontrolled Will continue ASA, Plavix, and Statin and monitor for S/Sx of angina/ACS. Continue to monitor on telemetry.   - see NSTEMI    Peripheral vascular disease  Known PAD from last angiogram 3/2023  Continue asa, plavix, statin         VTE Risk Mitigation (From admission, onward)           Ordered     heparin 25,000 units in dextrose 5% 250 mL (100 units/mL) infusion LOW INTENSITY nomogram - [facility]  Continuous        Question:  Begin at (units/kg/hr)  Answer:  12    01/13/24 0819     IP VTE LOW RISK PATIENT  Once         01/11/24 1648     heparin 25,000 units in dextrose 5% (100 units/ml) IV bolus from bag - ADDITIONAL PRN BOLUS - 60 units/kg (max bolus 4000 units)  As needed (PRN)        Question:  Heparin Infusion Adjustment (DO NOT MODIFY ANSWER)  Answer:  [email or web address]\epic\Images\Pharmacy\HeparinInfusions\heparin LOW INTENSITY nomogram for [facility] [id number]    01/11/24 0823     heparin 25,000 units in dextrose 5% (100 units/ml) IV bolus from bag - ADDITIONAL PRN BOLUS - 30 units/kg (max bolus 4000 units)  As needed (PRN)        Question:  Heparin Infusion Adjustment (DO NOT MODIFY ANSWER)  Answer:  [email or web address]\epic\Images\Pharmacy\HeparinInfusions\heparin LOW INTENSITY nomogram for [facility] [id number]    01/11/24 0823     Place sequential compression device  Until discontinued         01/11/24 0225                    Discharge Planning   NISA: 1/16/2024     Code Status: Full Code   Is the patient medically ready for discharge?:     Reason for patient still in hospital  (select all that apply): Patient trending condition and Treatment  Discharge Plan A: Home Health            Critical care time spent on the evaluation and treatment of severe organ dysfunction, review of pertinent labs and imaging studies, discussions with consulting providers and discussions with patient/family: 0 minutes.      Andry Zhao MD  Department of Hospital Medicine   [facility] - Intensive Care

## 2024-01-13 NOTE — ASSESSMENT & PLAN NOTE
TYPE 1 VERSUS TYPE 2.  PATIENT IS CHEST PAIN-FREE.  STATES THAT PRIOR TO HIS PCI WHEN HE HAD THE NON-STEMI LAST TIME, HE DID HAVE CHEST PAINS.  EKG DOES SHOW ST DEPRESSIONS.  CASE DISCUSSED IN DETAIL WITH THE DR. TAMAYO WHO IS THE PATIENT'S PRIMARY CARDIOLOGIST.  LAST PCI WAS COMPLICATED BY PULMONARY AND RETROPERITONEAL HEMORRHAGE.  WHEN THE PATIENT SAW DR. TAMAYO IN CLINIC AS AN OUTPATIENT, DR. TAMAYO HAD RECOMMENDED PALLIATIVE CARE CONSULT.  TODAY WITH ME AND DR. TAMAYO HAD A DETAILED DISCUSSION AND CONSIDERING HIS POOR STATE OF HEALTH, MULTIPLE COMORBIDITIES, THE FACT THAT HE IS CHEST PAIN-FREE AND THE FACT THAT HE HAD MULTIPLE COMPLICATIONS CHRONIC PULMONARY HEMORRHAGE AS WELL AS RETROPERITONEAL HEMORRHAGE AFTER HIS LAST CATHETERIZATION AND PCI BY DR. WEST, WE RECOMMEND MEDICAL MANAGEMENT AND PALLIATIVE CARE CONSULT AT THE CURRENT TIME.  HE IS CURRENTLY COMFORTABLE AND CHEST PAIN-FREE.  CONTINUE MEDICAL MANAGEMENT.  IF DEVELOPS LIFESTYLE LIMITING ANGINA, THEN DISCUSSION OF HIGH-RISK PCI WILL BE HELD.    January 12th:  Continues to be chest pain-free.  Continue medical management    January 13th: Continues to be chest pain-free.  Continue medical management.

## 2024-01-14 LAB
ALBUMIN SERPL BCP-MCNC: 2.7 G/DL (ref 3.5–5.2)
ALP SERPL-CCNC: 73 U/L (ref 55–135)
ALT SERPL W/O P-5'-P-CCNC: 25 U/L (ref 10–44)
ANION GAP SERPL CALC-SCNC: 13 MMOL/L (ref 8–16)
APTT PPP: 27.1 SEC (ref 21–32)
AST SERPL-CCNC: 22 U/L (ref 10–40)
BACTERIA SPEC AEROBE CULT: ABNORMAL
BACTERIA SPEC AEROBE CULT: ABNORMAL
BASOPHILS # BLD AUTO: 0.03 K/UL (ref 0–0.2)
BASOPHILS NFR BLD: 0.4 % (ref 0–1.9)
BILIRUB SERPL-MCNC: 0.6 MG/DL (ref 0.1–1)
BUN SERPL-MCNC: 20 MG/DL (ref 8–23)
CALCIUM SERPL-MCNC: 9.6 MG/DL (ref 8.7–10.5)
CHLORIDE SERPL-SCNC: 95 MMOL/L (ref 95–110)
CO2 SERPL-SCNC: 32 MMOL/L (ref 23–29)
CREAT SERPL-MCNC: 0.8 MG/DL (ref 0.5–1.4)
DIFFERENTIAL METHOD BLD: ABNORMAL
EOSINOPHIL # BLD AUTO: 0.1 K/UL (ref 0–0.5)
EOSINOPHIL NFR BLD: 1 % (ref 0–8)
ERYTHROCYTE [DISTWIDTH] IN BLOOD BY AUTOMATED COUNT: 14.8 % (ref 11.5–14.5)
EST. GFR  (NO RACE VARIABLE): >60 ML/MIN/1.73 M^2
GLUCOSE SERPL-MCNC: 87 MG/DL (ref 70–110)
GRAM STN SPEC: ABNORMAL
HCT VFR BLD AUTO: 33.8 % (ref 40–54)
HGB BLD-MCNC: 10.1 G/DL (ref 14–18)
IMM GRANULOCYTES # BLD AUTO: 0.02 K/UL (ref 0–0.04)
IMM GRANULOCYTES NFR BLD AUTO: 0.2 % (ref 0–0.5)
LYMPHOCYTES # BLD AUTO: 0.5 K/UL (ref 1–4.8)
LYMPHOCYTES NFR BLD: 5.5 % (ref 18–48)
MAGNESIUM SERPL-MCNC: 2 MG/DL (ref 1.6–2.6)
MCH RBC QN AUTO: 28.6 PG (ref 27–31)
MCHC RBC AUTO-ENTMCNC: 29.9 G/DL (ref 32–36)
MCV RBC AUTO: 96 FL (ref 82–98)
MONOCYTES # BLD AUTO: 0.8 K/UL (ref 0.3–1)
MONOCYTES NFR BLD: 9.8 % (ref 4–15)
NEUTROPHILS # BLD AUTO: 7 K/UL (ref 1.8–7.7)
NEUTROPHILS NFR BLD: 83.1 % (ref 38–73)
NRBC BLD-RTO: 0 /100 WBC
PLATELET # BLD AUTO: 200 K/UL (ref 150–450)
PMV BLD AUTO: 10.3 FL (ref 9.2–12.9)
POCT GLUCOSE: 130 MG/DL (ref 70–110)
POCT GLUCOSE: 136 MG/DL (ref 70–110)
POCT GLUCOSE: 90 MG/DL (ref 70–110)
POTASSIUM SERPL-SCNC: 3.5 MMOL/L (ref 3.5–5.1)
PROT SERPL-MCNC: 7 G/DL (ref 6–8.4)
RBC # BLD AUTO: 3.53 M/UL (ref 4.6–6.2)
SODIUM SERPL-SCNC: 140 MMOL/L (ref 136–145)
WBC # BLD AUTO: 8.41 K/UL (ref 3.9–12.7)

## 2024-01-14 PROCEDURE — 20000000 HC ICU ROOM

## 2024-01-14 PROCEDURE — 25000003 PHARM REV CODE 250: Performed by: STUDENT IN AN ORGANIZED HEALTH CARE EDUCATION/TRAINING PROGRAM

## 2024-01-14 PROCEDURE — 94761 N-INVAS EAR/PLS OXIMETRY MLT: CPT

## 2024-01-14 PROCEDURE — 27000221 HC OXYGEN, UP TO 24 HOURS

## 2024-01-14 PROCEDURE — 99291 CRITICAL CARE FIRST HOUR: CPT | Mod: ,,, | Performed by: INTERNAL MEDICINE

## 2024-01-14 PROCEDURE — 25000242 PHARM REV CODE 250 ALT 637 W/ HCPCS: Performed by: STUDENT IN AN ORGANIZED HEALTH CARE EDUCATION/TRAINING PROGRAM

## 2024-01-14 PROCEDURE — 63600175 PHARM REV CODE 636 W HCPCS: Performed by: STUDENT IN AN ORGANIZED HEALTH CARE EDUCATION/TRAINING PROGRAM

## 2024-01-14 PROCEDURE — 99900026 HC AIRWAY MAINTENANCE (STAT)

## 2024-01-14 PROCEDURE — 83735 ASSAY OF MAGNESIUM: CPT | Performed by: HOSPITALIST

## 2024-01-14 PROCEDURE — 80053 COMPREHEN METABOLIC PANEL: CPT | Performed by: HOSPITALIST

## 2024-01-14 PROCEDURE — 21400001 HC TELEMETRY ROOM

## 2024-01-14 PROCEDURE — 36415 COLL VENOUS BLD VENIPUNCTURE: CPT | Performed by: HOSPITALIST

## 2024-01-14 PROCEDURE — 85730 THROMBOPLASTIN TIME PARTIAL: CPT | Performed by: HOSPITALIST

## 2024-01-14 PROCEDURE — 25000003 PHARM REV CODE 250: Performed by: HOSPITALIST

## 2024-01-14 PROCEDURE — 27200966 HC CLOSED SUCTION SYSTEM

## 2024-01-14 PROCEDURE — 94640 AIRWAY INHALATION TREATMENT: CPT

## 2024-01-14 PROCEDURE — 99900035 HC TECH TIME PER 15 MIN (STAT)

## 2024-01-14 PROCEDURE — 85025 COMPLETE CBC W/AUTO DIFF WBC: CPT | Performed by: HOSPITALIST

## 2024-01-14 RX ADMIN — SODIUM CHLORIDE SOLN NEBU 3% 4 ML: 3 NEBU SOLN at 07:01

## 2024-01-14 RX ADMIN — ATORVASTATIN CALCIUM 40 MG: 40 TABLET, FILM COATED ORAL at 08:01

## 2024-01-14 RX ADMIN — IPRATROPIUM BROMIDE AND ALBUTEROL SULFATE 3 ML: 2.5; .5 SOLUTION RESPIRATORY (INHALATION) at 08:01

## 2024-01-14 RX ADMIN — ASPIRIN 81 MG CHEWABLE TABLET 81 MG: 81 TABLET CHEWABLE at 08:01

## 2024-01-14 RX ADMIN — SODIUM CHLORIDE SOLN NEBU 3% 4 ML: 3 NEBU SOLN at 08:01

## 2024-01-14 RX ADMIN — CEFEPIME 2 G: 2 INJECTION, POWDER, FOR SOLUTION INTRAVENOUS at 12:01

## 2024-01-14 RX ADMIN — PIPERACILLIN AND TAZOBACTAM 4.5 G: 4; .5 INJECTION, POWDER, LYOPHILIZED, FOR SOLUTION INTRAVENOUS; PARENTERAL at 08:01

## 2024-01-14 RX ADMIN — FLUOXETINE HYDROCHLORIDE 20 MG: 20 SOLUTION ORAL at 08:01

## 2024-01-14 RX ADMIN — CEFEPIME 2 G: 2 INJECTION, POWDER, FOR SOLUTION INTRAVENOUS at 08:01

## 2024-01-14 RX ADMIN — IPRATROPIUM BROMIDE AND ALBUTEROL SULFATE 3 ML: 2.5; .5 SOLUTION RESPIRATORY (INHALATION) at 03:01

## 2024-01-14 RX ADMIN — MUPIROCIN: 20 OINTMENT TOPICAL at 08:01

## 2024-01-14 RX ADMIN — CLOPIDOGREL BISULFATE 75 MG: 75 TABLET ORAL at 08:01

## 2024-01-14 RX ADMIN — IPRATROPIUM BROMIDE AND ALBUTEROL SULFATE 3 ML: 2.5; .5 SOLUTION RESPIRATORY (INHALATION) at 07:01

## 2024-01-14 RX ADMIN — MELATONIN TAB 3 MG 6 MG: 3 TAB at 08:01

## 2024-01-14 RX ADMIN — FUROSEMIDE 20 MG: 20 TABLET ORAL at 08:01

## 2024-01-14 RX ADMIN — FOLIC ACID 1 MG: 1 TABLET ORAL at 08:01

## 2024-01-14 RX ADMIN — IPRATROPIUM BROMIDE AND ALBUTEROL SULFATE 3 ML: 2.5; .5 SOLUTION RESPIRATORY (INHALATION) at 01:01

## 2024-01-14 RX ADMIN — HYDROXYZINE HYDROCHLORIDE 25 MG: 25 TABLET ORAL at 08:01

## 2024-01-14 RX ADMIN — IPRATROPIUM BROMIDE AND ALBUTEROL SULFATE 3 ML: 2.5; .5 SOLUTION RESPIRATORY (INHALATION) at 11:01

## 2024-01-14 RX ADMIN — IPRATROPIUM BROMIDE AND ALBUTEROL SULFATE 3 ML: 2.5; .5 SOLUTION RESPIRATORY (INHALATION) at 04:01

## 2024-01-14 RX ADMIN — PIPERACILLIN AND TAZOBACTAM 4.5 G: 4; .5 INJECTION, POWDER, LYOPHILIZED, FOR SOLUTION INTRAVENOUS; PARENTERAL at 12:01

## 2024-01-14 NOTE — SUBJECTIVE & OBJECTIVE
Interval History:  Continues to be chest pain-free      Past Medical History:   Diagnosis Date    Cancer     skin to Rt forearm and face    COPD (chronic obstructive pulmonary disease)     Hyperlipidemia     Hypertension     Pseudoaneurysm        Past Surgical History:   Procedure Laterality Date    ABDOMINAL SURGERY      stents placed in liver and large intestines, per patient    ANGIOGRAPHY OF AORTIC ARCH  3/27/2023    Procedure: Aortogram, Aortic Arch;  Surgeon: Tim Bhatt MD;  Location: [facility] CATH LAB;  Service: Cardiology;;    AORTOGRAPHY WITH SERIALOGRAPHY N/A 3/27/2023    Procedure: AORTOGRAM, WITH SERIALOGRAPHY;  Surgeon: Tim Bhatt MD;  Location: [facility] CATH LAB;  Service: Cardiology;  Laterality: N/A;    CAROTID STENT      COLONOSCOPY N/A 09/27/2022    Procedure: COLONOSCOPY;  Surgeon: Donnie Peterson MD;  Location: [facility] ENDO (2ND FLR);  Service: Endoscopy;  Laterality: N/A;    COLONOSCOPY N/A 09/30/2022    Procedure: COLONOSCOPY;  Surgeon: Joy Cabrera MD;  Location: [facility] ENDO (2ND FLR);  Service: Endoscopy;  Laterality: N/A;    CORONARY STENT PLACEMENT  01/2000    DISSECTION OF NECK Bilateral 01/04/2022    Procedure: DISSECTION, NECK;  Surgeon: Naeem Smalls MD;  Location: [facility] OR 2ND FLR;  Service: ENT;  Laterality: Bilateral;    ESOPHAGOGASTRODUODENOSCOPY N/A 09/27/2022    Procedure: EGD (ESOPHAGOGASTRODUODENOSCOPY);  Surgeon: Donnie Peterson MD;  Location: [facility] ENDO (2ND FLR);  Service: Endoscopy;  Laterality: N/A;    ESOPHAGOGASTRODUODENOSCOPY N/A 09/30/2022    Procedure: EGD (ESOPHAGOGASTRODUODENOSCOPY);  Surgeon: Joy Cabrera MD;  Location: [facility] ENDO (2ND FLR);  Service: Endoscopy;  Laterality: N/A;    ESOPHAGOGASTRODUODENOSCOPY W/ PEG N/A 01/04/2022    Procedure: EGD, WITH PEG TUBE INSERTION;  Surgeon: Anthony Calabrese MD;  Location: [facility] OR 2ND FLR;  Service: General;  Laterality: N/A;    EYE SURGERY      Cataract bilateral    femoral stents      bilateral    FLAP PROCEDURE  N/A 01/03/2022    Procedure: CREATION, FREE FLAP;  Surgeon: Naeem Smalls MD;  Location: [facility] OR [facility] FLR;  Service: ENT;  Laterality: N/A;    FLAP PROCEDURE Right 01/04/2022    Procedure: CREATION, FREE FLAP;  Surgeon: Stacey Conde MD;  [facility];  Service: ENT;  Laterality: Right;  Ischemic start 1203  Ischemic stop 1353    GLOSSECTOMY N/A 01/04/2022    Procedure: GLOSSECTOMY;  Surgeon: Naeem Smalls MD;  Location: [facility] OR [facility] FLR;  Service: ENT;  Laterality: N/A;    LEFT HEART CATHETERIZATION Left 3/23/2023    Procedure: Left heart cath;  Surgeon: Tacos Benitez MD;  Location: [facility] CATH LAB;  Service: Cardiology;  Laterality: Left;    PERCUTANEOUS TRANSLUMINAL BALLOON ANGIOPLASTY OF CORONARY ARTERY Left 3/27/2023    Procedure: Angioplasty-coronary;  Surgeon: Tim Bhatt MD;  Location: [facility] CATH LAB;  Service: Cardiology;  Laterality: Left;  not before 9am, R rad access, 6 Fr EBU 3.5 guide    RADICAL NECK DISSECTION Left 01/03/2022    Procedure: DISSECTION, NECK, RADICAL;  Surgeon: Naeem Smalls MD;  [facility];  Service: ENT;  Laterality: Left;    SKIN BIOPSY      SKIN CANCER EXCISION      STENT, CORONARY, MULTI VESSEL  3/27/2023    Procedure: Stent, Coronary, Multi Vessel;  Surgeon: Tim Bhatt MD;  Location: [facility] CATH LAB;  Service: Cardiology;;    TRACHEOTOMY N/A 01/04/2022    Procedure: TRACHEOTOMY;  Surgeon: Naeem Smalls MD;  [facility];  Service: ENT;  Laterality: N/A;    VASCULAR SURGERY         Review of patient's allergies indicates:   Allergen Reactions    Ativan [lorazepam] Anxiety       No current facility-administered medications on file prior to encounter.     Current Outpatient Medications on File Prior to Encounter   Medication Sig    albuterol-ipratropium (DUO-NEB) 2.5 mg-0.5 mg/3 mL nebulizer solution Take 3 mLs by nebulization every 4 (four) hours as needed for Wheezing. Rescue    ascorbic acid, vitamin  C, (VITAMIN C) 100 MG tablet Take 100 mg by mouth once daily.    aspirin 81 MG Chew Take 1 tablet (81 mg total) by mouth once daily.    atorvastatin (LIPITOR) 40 MG tablet 1 tablet (40 mg total) by Per G Tube route every evening. (Patient taking differently: 40 mg by Per G Tube route once daily.)    bisacodyL (DULCOLAX) 10 mg Supp Place 1 suppository (10 mg total) rectally daily as needed.    clopidogreL (PLAVIX) 75 mg tablet Take 1 tablet (75 mg total) by mouth once daily. (Patient taking differently: Take 75 mg by mouth nightly.)    ferrous sulfate 325 (65 FE) MG EC tablet Take 325 mg by mouth 3 (three) times daily with meals.    FLUoxetine (PROZAC) 20 mg/5 mL (4 mg/mL) solution Take 20 mg by mouth once daily.    folic acid (FOLVITE) 1 MG tablet Take 1 mg by mouth once daily.    furosemide (LASIX) 40 MG tablet Take 0.5 tablets (20 mg total) by mouth once daily. (Patient taking differently: Take 20 mg by mouth nightly.)    glycopyrrolate (CUVPOSA) 1 mg/5 mL (0.2 mg/mL) Soln Take 5 mLs (1 mg total) by mouth 3 (three) times daily as needed (TO REDUCE SECRETIONS).    hydrOXYzine HCL (ATARAX) 25 MG tablet SMARTSI.5 Tablet(s) Gastro Tube Every 8 Hours PRN    melatonin (MELATIN) 3 mg tablet 1 tablet (3 mg total) by Per G Tube route nightly.    metoprolol tartrate (LOPRESSOR) 25 MG tablet Take 0.5 tablets (12.5 mg total) by mouth 2 (two) times daily.    omeprazole (PRILOSEC) 40 MG capsule Take 40 mg (contents of one capsule) twice daily through PEG tube. Mix contents of capsule with 10 mL applesauce. (Patient taking differently: 40 mg 2 (two) times a day. Take 40 mg (contents of one capsule) twice daily through PEG tube. Mix contents of capsule with 10 mL applesauce.)    polyethylene glycol (GLYCOLAX) 17 gram PwPk 17 g by Per G Tube route 2 (two) times daily.    sodium chloride 3% 3 % nebulizer solution Take 4 mLs by nebulization 2 (two) times a day.    thiamine (VITAMIN B-1) 50 MG tablet Take 50 mg by mouth once  daily.    WIXELA INHUB 250-50 mcg/dose diskus inhaler SMARTSI Puff(s) By Mouth Every 12 Hours    [DISCONTINUED] losartan (COZAAR) 50 MG tablet 1 tablet (50 mg total) by Per G Tube route once daily.     Family History    None       Tobacco Use    Smoking status: Former     Current packs/day: 0.00     Average packs/day: 2.0 packs/day for 55.0 years (110.0 ttl pk-yrs)     Types: Cigarettes     Start date: 1963     Quit date: 2018     Years since quittin.7    Smokeless tobacco: Never    Tobacco comments:     3/3 ppd x 40 yrs. Currently 3-4 cigarettes daily .He is trying  to quit. Is using a Vapor cigarettes  2-3 x's a day.   Substance and Sexual Activity    Alcohol use: Not Currently    Drug use: No    Sexual activity: Not Currently     Review of Systems   Constitutional:  Positive for fatigue and fever.   HENT:  Negative for congestion.    Eyes:  Negative for discharge.   Respiratory:  Positive for cough and shortness of breath. Negative for chest tightness.    Cardiovascular:  Negative for chest pain and leg swelling.   Gastrointestinal:  Negative for abdominal pain.   Endocrine: Negative for polyphagia.   Genitourinary:  Negative for difficulty urinating.   Musculoskeletal:  Positive for arthralgias.   Skin:  Negative for color change.   Allergic/Immunologic: Negative for food allergies.   Neurological:  Negative for headaches.   Psychiatric/Behavioral:  Negative for confusion.      Objective:     Vital Signs (Most Recent):  Temp: 97.7 °F (36.5 °C) (24 1101)  Pulse: 92 (24 1200)  Resp: (!) 27 (24 1200)  BP: 103/70 (24 1200)  SpO2: 96 % (24 1200) Vital Signs (24h Range):  Temp:  [97.7 °F (36.5 °C)-98.4 °F (36.9 °C)] 97.7 °F (36.5 °C)  Pulse:  [] 92  Resp:  [22-45] 27  SpO2:  [92 %-100 %] 96 %  BP: ()/(65-85) 103/70     Weight: 59.9 kg (132 lb)  Body mass index is 19.49 kg/m².     Physical Exam  Vitals and nursing note reviewed.   Constitutional:       General:  He is not in acute distress.     Appearance: He is ill-appearing (chronically). He is not toxic-appearing.   HENT:      Head: Normocephalic and atraumatic.      Mouth/Throat:      Mouth: Mucous membranes are moist.   Cardiovascular:      Rate and Rhythm: Normal rate and regular rhythm.   Pulmonary:      Effort: Pulmonary effort is normal.      Breath sounds: Normal breath sounds. No wheezing, rhonchi or rales.      Comments: 10L trach collar  Abdominal:      General: Bowel sounds are normal. There is no distension.      Palpations: Abdomen is soft. There is no mass.      Tenderness: There is no abdominal tenderness. There is no guarding.      Comments: PEG in place   Musculoskeletal:      Right lower leg: No edema.      Left lower leg: No edema.   Skin:     General: Skin is warm and dry.   Neurological:      Mental Status: He is alert. Mental status is at baseline.                  DATA:     Laboratory:  CBC:  Recent Labs   Lab 01/12/24  0635 01/13/24  0241 01/14/24  0449   WBC 12.29 9.56 8.41   Hemoglobin 9.2 L 9.5 L 10.1 L   Hematocrit 30.8 L 30.9 L 33.8 L   Platelets 177 172 200         CHEMISTRIES:  Recent Labs   Lab 05/12/22  0343 05/14/22  0724 05/17/22  1007 08/24/22  1000 01/12/24  0635 01/13/24  0241 01/14/24  0449   Glucose 110 96 162 H   < > 167 H 328 H 87   Sodium 135 L 136 133 L   < > 134 L 133 L 140   Potassium 4.4 4.1 5.0   < > 3.6 3.0 L 3.5   BUN 24 H 18 15   < > 25 H 22 20   Creatinine 0.7 0.7 0.7   < > 0.9 1.0 0.8   eGFR if African American >60.0 >60 >60.0  --   --   --   --    eGFR if non African American >60.0 >60 >60.0  --   --   --   --    Calcium 9.1 9.6 9.5   < > 8.9 8.7 9.6   Magnesium 2.0  --  2.0   < > 2.1 1.7 2.0    < > = values in this interval not displayed.         CARDIAC BIOMARKERS:  Recent Labs   Lab 01/11/24  0021 01/11/24  0311 01/11/24  0855   Troponin I 0.393 H 1.011 H 3.344 H         COAGS:  Recent Labs   Lab 04/02/23  0322 11/26/23  0505 01/11/24  0855   INR 1.0 1.1 1.1          LIPIDS/LFTS:  Recent Labs   Lab 08/29/23  1522 10/20/23  0815 01/12/24  0635 01/13/24  0241 01/14/24  0449   Cholesterol 85 L  --  95 L  --   --    Triglycerides 68  --  60  --   --    HDL 25 L  --  37 L  --   --    LDL Cholesterol 46.4 L  --  46.0 L  --   --    Non-HDL Cholesterol 60  --  58  --   --    AST 20   < > 55 H 38 22   ALT 28   < > 29 33 25    < > = values in this interval not displayed.         Hemoglobin A1C   Date Value Ref Range Status   08/29/2023 5.8 (H) 4.0 - 5.6 % Final     Comment:     ADA Screening Guidelines:  5.7-6.4%  Consistent with prediabetes  >or=6.5%  Consistent with diabetes    High levels of fetal hemoglobin interfere with the HbA1C  assay. Heterozygous hemoglobin variants (HbS, HgC, etc)do  not significantly interfere with this assay.   However, presence of multiple variants may affect accuracy.     09/27/2022 6.3 (H) 4.0 - 5.6 % Final     Comment:     ADA Screening Guidelines:  5.7-6.4%  Consistent with prediabetes  >or=6.5%  Consistent with diabetes    High levels of fetal hemoglobin interfere with the HbA1C  assay. Heterozygous hemoglobin variants (HbS, HgC, etc)do  not significantly interfere with this assay.   However, presence of multiple variants may affect accuracy.         TSH  Recent Labs   Lab 05/23/23  0849 07/10/23  1040 08/29/23  1522   TSH 4.379 H 3.610 4.460 H         The ASCVD Risk score (Caitlyn DK, et al., 2019) failed to calculate for the following reasons:    The patient has a prior MI or stroke diagnosis       BNP    Lab Results   Component Value Date/Time    BNP 2,467 (H) 01/11/2024 12:21 AM    BNP 1,708 (H) 11/20/2023 04:06 PM     (H) 10/20/2023 08:15 AM    BNP 1,185 (H) 04/18/2023 07:07 AM    BNP 2,543 (H) 03/21/2023 03:25 PM     (H) 03/18/2023 11:06 AM     (H) 09/23/2022 12:47 PM     (H) 09/14/2022 04:59 AM    BNP 98 05/14/2022 07:25 AM     (H) 04/22/2022 05:04 PM    BNP 1,607 (H) 04/19/2022 09:29 AM     (H)  03/21/2022 02:39 PM     (H) 11/02/2019 02:52 AM     (H) 12/27/2018 01:29 PM            ECHO    Results for orders placed during the hospital encounter of 01/11/24    Echo    Interpretation Summary    Left Ventricle: The left ventricle is normal in size. Mildly increased wall thickness. There is concentric hypertrophy. Normal wall motion. There is low normal systolic function with a visually estimated ejection fraction of 50 - 55%. Unable to assess diastolic function due to E-A fusion.    Right Ventricle: Normal right ventricular cavity size. Systolic function is normal.    Left Atrium: Left atrium is moderately dilated.    Right Atrium: Right atrium is mildly dilated.    Mitral Valve: There is mild regurgitation.    Tricuspid Valve: There is mild regurgitation.    Pulmonary Artery: The estimated pulmonary artery systolic pressure is 13 mmHg.    IVC/SVC: Normal venous pressure at 3 mmHg.      STRESS TEST    No results found for this or any previous visit.        CATH    Results for orders placed during the hospital encounter of 03/18/23    Cardiac catheterization    Conclusion  Description of the findings of the procedure: uneventful LHC/cor angio/Ao angio/iliac angio/PCI prox LAD BRADLEY x1, PCI mid LCx BRADLEY x1/R rad vasband/RFA man comp.    Findings/Key Components:  LVEDP: 3mmHg  LVEF: 60%    Aortic arch: severe calcific atherosclerosis  Ost innominate artery 80%  Prox R SCA 90%, 53mmHg gradient across stenoses.  Prox-mid ICA patent  Distal aortoiliac bifurcation with patent stents in ЮЛИЯ bilaterally and possible severe ISR in R ЮЛИЯ.    Severe diffuse cor Ca++  Dominance: Right  LM: MLI  LAD: prox 95% at D1, mid  Ramus: MLI  LCx: mid 90%  RCA: not injected, diffuse disease, severe dist RCA stenosis (see Dr. Benitez cath report    PCI prox LAD:  Preop ASA/Plavix, intraop heparin  Wired LAD/D1  Predil 2.5x12, 2.75x15 NC  Stent 3.0x24 Synergy BRADLEY  Post IVUS with mild underexpansion mid stent  Postdil 3.25x20  NC at 22atm  Excellent angiographic result, T3 flow, 0% residual stenosis    PCI mid LCx  Predil 2.5x12  Stent 2.75x20 Synergy BRADLEY 14 fidelia  Unavble to pass IVUS due to severe prox LCx tortuosity  Excellent angiographic result, T3 flow, 0% residual stenosis    Hemostasis:  R Radial band  RFA man comp

## 2024-01-14 NOTE — NURSING
[facility]  ICU Shift Summary  Date: 1/14/2024      Prehospitalization: Home  Admit Date / LOS : 1/11/2024/ 3 days    Diagnosis: Acute on chronic respiratory failure with hypoxia    Consults:        Active: Cardio, Palliative, and SW       Needed: N/A     Code Status: Full Code   Advanced Directive: Not Received    LDA:  Lines/Drains/Airways       Drain  Duration                  Gastrostomy/Enterostomy 01/04/22 Percutaneous endoscopic gastrostomy (PEG)  days    Male External Urinary Catheter 01/13/24 1800 <1 day              Airway  Duration             Adult Surgical Airway 03/16/23 1014 Shiley Cuffed 6.0 304 days              Peripheral Intravenous Line  Duration                  Peripheral IV - Single Lumen 01/10/24 2341 18 G Left;Posterior Forearm 3 days         Peripheral IV - Single Lumen 01/11/24 0013 20 G Right Antecubital 3 days                  Central Lines/Site/Justification:Patient Does Not Have Central Line  Urinary Cath/Order/Justification:Patient Does Not Have Urinary Catheter    Vasopressors/Infusions:        GOALS: Volume/ Hemodynamic: N/A                     RASS: 0  alert and calm    Pain Management: none       Pain Controlled: yes     Rhythm: NSR    Respiratory Device: tracheostomy collar   Oxygen Concentration (%):  [40-80] 60                 Most Recent SBT/ SAT: N/A       MOVE Screen: FAIL  ICU Liberation: yes    VTE Prophylaxis: Pharm  Mobility: Bedrest  Stress Ulcer Prophylaxis: No    Isolation: No active isolations    Dietary:   Current Diet Order   Procedures    Diet NPO Except for: Sips with Medication     Order Specific Question:   Except for     Answer:   Sips with Medication      Tolerance: yes  Advancement: no    I & O (24h):    Intake/Output Summary (Last 24 hours) at 1/14/2024 1703  Last data filed at 1/14/2024 1501  Gross per 24 hour   Intake 637.86 ml   Output 800 ml   Net -162.14 ml        Restraints: No    Significant Dates:  Post Op Date: N/A  Rescue  "Date: N/A  Imaging/ Diagnostics: N/A    Noteworthy Labs:  none    COVID Test: (--)  CBC/Anemia Labs: Coags:    Recent Labs   Lab 01/13/24  0241 01/14/24  0449   WBC 9.56 8.41   HGB 9.5* 10.1*   HCT 30.9* 33.8*    200   MCV 95 96   RDW 15.2* 14.8*    Recent Labs   Lab 01/11/24  0855 01/11/24  1557 01/13/24  1255 01/14/24  0449   INR 1.1  --   --   --    APTT 26.3   < > 27.8 27.1    < > = values in this interval not displayed.        Chemistries:   Recent Labs   Lab 01/12/24  0635 01/13/24  0241 01/14/24 0449   * 133* 140   K 3.6 3.0* 3.5   CL 94* 88* 95   CO2 31* 31* 32*   BUN 25* 22 20   CREATININE 0.9 1.0 0.8   CALCIUM 8.9 8.7 9.6   PROT 6.6 6.5 7.0   BILITOT 0.5 0.5 0.6   ALKPHOS 76 74 73   ALT 29 33 25   AST 55* 38 22   MG 2.1 1.7 2.0   PHOS 3.0  --   --         Cardiac Enzymes: Ejection Fractions:    No results for input(s): "CPK", "CPKMB", "MB", "TROPONINI" in the last 72 hours. EF   Date Value Ref Range Status   03/20/2023 60 % Final        POCT Glucose: HbA1c:    Recent Labs   Lab 01/13/24  1713 01/14/24  0042 01/14/24  1223   POCTGLUCOSE 120* 90 136*    Hemoglobin A1C   Date Value Ref Range Status   08/29/2023 5.8 (H) 4.0 - 5.6 % Final     Comment:     ADA Screening Guidelines:  5.7-6.4%  Consistent with prediabetes  >or=6.5%  Consistent with diabetes    High levels of fetal hemoglobin interfere with the HbA1C  assay. Heterozygous hemoglobin variants (HbS, HgC, etc)do  not significantly interfere with this assay.   However, presence of multiple variants may affect accuracy.             ICU LOS 3d 13h  Level of Care: {[facility] Level of Care:46304}    [facility]  Shift Summary/Plan for the shift: {NONE:58621}  "

## 2024-01-14 NOTE — NURSING
[facility]  ICU Shift Summary  Date: 1/14/2024      Prehospitalization: Home  Admit Date / LOS : 1/11/2024/ 3 days    Diagnosis: Acute on chronic respiratory failure with hypoxia    Consults:        Active: Cardio, Palliative, and SW       Needed: N/A     Code Status: Full Code   Advanced Directive: Not Received    LDA:  Lines/Drains/Airways       Drain  Duration                  Gastrostomy/Enterostomy 01/04/22 Percutaneous endoscopic gastrostomy (PEG)  days    Male External Urinary Catheter 01/13/24 1800 <1 day              Airway  Duration             Adult Surgical Airway 03/16/23 1014 Shiley Cuffed 6.0 304 days              Peripheral Intravenous Line  Duration                  Peripheral IV - Single Lumen 01/10/24 2341 18 G Left;Posterior Forearm 3 days         Peripheral IV - Single Lumen 01/11/24 0013 20 G Right Antecubital 3 days                  Central Lines/Site/Justification:Patient Does Not Have Central Line  Urinary Cath/Order/Justification:Patient Does Not Have Urinary Catheter    Vasopressors/Infusions:        GOALS: Volume/ Hemodynamic: N/A                     RASS: 0  alert and calm    Pain Management: none       Pain Controlled: yes     Rhythm: NSR    Respiratory Device: Tracheostomy collar   Oxygen Concentration (%):  [40-80] 60                 Most Recent SBT/ SAT: N/A       MOVE Screen: FAIL  ICU Liberation: no    VTE Prophylaxis: Pharm  Mobility: Bedrest  Stress Ulcer Prophylaxis: No    Isolation: No active isolations    Dietary:   Current Diet Order   Procedures    Diet NPO Except for: Sips with Medication     Order Specific Question:   Except for     Answer:   Sips with Medication      Tolerance: yes  Advancement: no    I & O (24h):    Intake/Output Summary (Last 24 hours) at 1/14/2024 1743  Last data filed at 1/14/2024 1501  Gross per 24 hour   Intake 637.86 ml   Output 800 ml   Net -162.14 ml        Restraints: No    Significant Dates:  Post Op Date: N/A  Rescue  "Date: N/A  Imaging/ Diagnostics: N/A    Noteworthy Labs:  none    COVID Test: (--)  CBC/Anemia Labs: Coags:    Recent Labs   Lab 01/13/24  0241 01/14/24  0449   WBC 9.56 8.41   HGB 9.5* 10.1*   HCT 30.9* 33.8*    200   MCV 95 96   RDW 15.2* 14.8*    Recent Labs   Lab 01/11/24  0855 01/11/24  1557 01/13/24  1255 01/14/24  0449   INR 1.1  --   --   --    APTT 26.3   < > 27.8 27.1    < > = values in this interval not displayed.        Chemistries:   Recent Labs   Lab 01/12/24  0635 01/13/24  0241 01/14/24 0449   * 133* 140   K 3.6 3.0* 3.5   CL 94* 88* 95   CO2 31* 31* 32*   BUN 25* 22 20   CREATININE 0.9 1.0 0.8   CALCIUM 8.9 8.7 9.6   PROT 6.6 6.5 7.0   BILITOT 0.5 0.5 0.6   ALKPHOS 76 74 73   ALT 29 33 25   AST 55* 38 22   MG 2.1 1.7 2.0   PHOS 3.0  --   --         Cardiac Enzymes: Ejection Fractions:    No results for input(s): "CPK", "CPKMB", "MB", "TROPONINI" in the last 72 hours. EF   Date Value Ref Range Status   03/20/2023 60 % Final        POCT Glucose: HbA1c:    Recent Labs   Lab 01/14/24  0042 01/14/24  1223 01/14/24  1737   POCTGLUCOSE 90 136* 130*    Hemoglobin A1C   Date Value Ref Range Status   08/29/2023 5.8 (H) 4.0 - 5.6 % Final     Comment:     ADA Screening Guidelines:  5.7-6.4%  Consistent with prediabetes  >or=6.5%  Consistent with diabetes    High levels of fetal hemoglobin interfere with the HbA1C  assay. Heterozygous hemoglobin variants (HbS, HgC, etc)do  not significantly interfere with this assay.   However, presence of multiple variants may affect accuracy.             ICU LOS 3d 14h  Level of Care: Critical Care    Chart Check: 12 HR Done  Shift Summary/Plan for the shift: none  "

## 2024-01-14 NOTE — NURSING
Tube feeds turned off per patient's request. Stated stomach feels full. Will continue to monitor.

## 2024-01-14 NOTE — PROGRESS NOTES
[facility] Medicine  Progress Note    Patient Name: Fareed Richard Jr.  MRN: [id number]  Patient Class: IP- Inpatient   Admission Date: 1/11/2024  Length of Stay: 3 days  Attending Physician: Andry Zhao MD  Primary Care Provider: Kodi Tubbs MD        Subjective:     Principal Problem:Acute on chronic respiratory failure with hypoxia        HPI:  Mr Fareed Richard Jr. 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 He is on 6L trach collar at home and receives tube feeding. He had shortness of breath, fever, and increased trach output. Denies chest pain.     In ED, he was tachycardic and tachypneic with SpO2 85% on 8L. He was given albuterol, vanc/zosyn, solumedrol. He was admitted to ICU.     Overview/Hospital Course:  Mr Fareed Richard Jr. is a 73 y.o. man who was admitted with acute on chronic hypoxic respiratory failure due to pneumonia, suspect recurrent Pseudomonas. Also with NSTEMI on admit in setting of known CAD. Started vanc, zosyn. Weaned O2. Cardiology consulted- due to severe CAD and complications with last angiogram <1 year ago, will plan medical management with heparin gtt, asa, plavix, and will NOT pursue ischemic evaluation. He is improving. Stepped down to floor.     Persistent Pseudomonas infection, on V+Z, reduced to Cefepime today. Stop Heparin gtt this morning at 48h. Pseudomonas not susceptible to Cefepime, and neither is the Achromobacter that grew out. Achromobacter ONLY sensitive to Zosyn, so will need Zosyn at d/c if not completed course here.         Interval History:  NAEON.  No new issues.   Wants to know if previous smaller PEG tube needs to be replaced  All questions answered and updates on care given.       ROS:  General: Negative for fevers or chills.  Cardiac: Negative for chest pain or orthopnea   Pulmonary:  Negative for dyspnea or wheezing.  GI: Negative for abdominal distention or pain     Vitals:    01/14/24 0300 01/14/24 0348 01/14/24 0400 01/14/24 0500   BP: 113/75  98/65 109/77   Pulse: 94 95 110 98   Resp: (!) 35 (!) 24 (!) 24 (!) 45   Temp:   97.9 °F (36.6 °C)    TempSrc:   Axillary    SpO2: 100% 100% (!) 92% 100%   Weight:       Height:              Body mass index is 19.49 kg/m².      Physical Exam  Vitals and nursing note reviewed.   Constitutional:       General: He is not in acute distress.     Appearance: He is ill-appearing (chronically). He is not toxic-appearing.   Neck:      Comments: Trach in place  Cardiovascular:      Rate and Rhythm: Normal rate and regular rhythm.   Pulmonary:      Effort: Pulmonary effort is normal.      Breath sounds: Normal breath sounds.      Comments: 10L/40% trach collar  Abdominal:      General: Bowel sounds are normal.      Palpations: Abdomen is soft.      Comments: PEG   Musculoskeletal:      Right lower leg: No edema.      Left lower leg: No edema.   Skin:     General: Skin is warm and dry.   Neurological:      Mental Status: He is alert. Mental status is at baseline.         Recent Results (from the past 24 hour(s))   POCT glucose    Collection Time: 01/13/24 12:09 PM   Result Value Ref Range    POCT Glucose 112 (H) 70 - 110 mg/dL   APTT    Collection Time: 01/13/24 12:55 PM   Result Value Ref Range    aPTT 27.8 21.0 - 32.0 sec   POCT glucose    Collection Time: 01/13/24  5:13 PM   Result Value Ref Range    POCT Glucose 120 (H) 70 - 110 mg/dL   POCT glucose    Collection Time: 01/14/24 12:42 AM   Result Value Ref Range    POCT Glucose 90 70 - 110 mg/dL   CBC Auto Differential    Collection Time: 01/14/24  4:49 AM   Result Value Ref Range    WBC 8.41 3.90 - 12.70 K/uL    RBC 3.53 (L) 4.60 - 6.20 M/uL    Hemoglobin 10.1 (L) 14.0 - 18.0 g/dL    Hematocrit 33.8 (L) 40.0 - 54.0 %    MCV 96 82 - 98 fL    MCH 28.6 27.0 - 31.0 pg    MCHC 29.9 (L) 32.0 - 36.0 g/dL    RDW 14.8  (H) 11.5 - 14.5 %    Platelets 200 150 - 450 K/uL    MPV 10.3 9.2 - 12.9 fL    Immature Granulocytes 0.2 0.0 - 0.5 %    Gran # (ANC) 7.0 1.8 - 7.7 K/uL    Immature Grans (Abs) 0.02 0.00 - 0.04 K/uL    Lymph # 0.5 (L) 1.0 - 4.8 K/uL    Mono # 0.8 0.3 - 1.0 K/uL    Eos # 0.1 0.0 - 0.5 K/uL    Baso # 0.03 0.00 - 0.20 K/uL    nRBC 0 0 /100 WBC    Gran % 83.1 (H) 38.0 - 73.0 %    Lymph % 5.5 (L) 18.0 - 48.0 %    Mono % 9.8 4.0 - 15.0 %    Eosinophil % 1.0 0.0 - 8.0 %    Basophil % 0.4 0.0 - 1.9 %    Differential Method Automated    Comprehensive Metabolic Panel    Collection Time: 01/14/24  4:49 AM   Result Value Ref Range    Sodium 140 136 - 145 mmol/L    Potassium 3.5 3.5 - 5.1 mmol/L    Chloride 95 95 - 110 mmol/L    CO2 32 (H) 23 - 29 mmol/L    Glucose 87 70 - 110 mg/dL    BUN 20 8 - 23 mg/dL    Creatinine 0.8 0.5 - 1.4 mg/dL    Calcium 9.6 8.7 - 10.5 mg/dL    Total Protein 7.0 6.0 - 8.4 g/dL    Albumin 2.7 (L) 3.5 - 5.2 g/dL    Total Bilirubin 0.6 0.1 - 1.0 mg/dL    Alkaline Phosphatase 73 55 - 135 U/L    AST 22 10 - 40 U/L    ALT 25 10 - 44 U/L    eGFR >60 >60 mL/min/1.73 m^2    Anion Gap 13 8 - 16 mmol/L   Magnesium    Collection Time: 01/14/24  4:49 AM   Result Value Ref Range    Magnesium 2.0 1.6 - 2.6 mg/dL   APTT    Collection Time: 01/14/24  4:49 AM   Result Value Ref Range    aPTT 27.1 21.0 - 32.0 sec       Microbiology Results (last 7 days)       Procedure Component Value Units Date/Time    Culture, Respiratory with Gram Stain [[id number]]  (Abnormal)  (Susceptibility) Collected: 01/11/24 0053    Order Status: Completed Specimen: Sputum Updated: 01/14/24 0631     Respiratory Culture PSEUDOMONAS AERUGINOSA  Many        ACHROMOBACTER XYLOSOXIDANS SUBSP. DENTRIFICANS  Few       Gram Stain (Respiratory) <10 epithelial cells per low power field.     Gram Stain (Respiratory) Moderate WBC's     Gram Stain (Respiratory) Moderate Gram negative rods    Blood Culture #1 **CANNOT BE ORDERED STAT** [[id number]]  Collected: 01/11/24 0116    Order Status: Completed Specimen: Blood from Peripheral, Forearm, Right Updated: 01/14/24 0303     Blood Culture, Routine No Growth to date      No Growth to date      No Growth to date      No Growth to date    Blood culture [[id number]] Collected: 01/11/24 0144    Order Status: Completed Specimen: Blood from Peripheral, Lower Arm, Left Updated: 01/14/24 0303     Blood Culture, Routine No Growth to date      No Growth to date      No Growth to date      No Growth to date    Culture, Respiratory with Gram Stain [[id number]]  (Abnormal) Collected: 01/11/24 0729    Order Status: Completed Specimen: Respiratory from Tracheal Aspirate Updated: 01/13/24 0833     Respiratory Culture PRESUMPTIVE PSEUDOMONAS SPECIES  Many  For susceptibility see order # [id number]       Gram Stain (Respiratory) Moderate WBC's     Gram Stain (Respiratory) Moderate Gram negative rods     Gram Stain (Respiratory) Few Gram positive cocci in pairs     Gram Stain (Respiratory) Rare Gram positive rods             Imaging Results              CTA Chest Non-Coronary (PE Studies) (Final result)  Result time 01/11/24 02:25:27      Final result by Héctor Miranda MD (01/11/24 02:25:27)                   Impression:      No acute pulmonary thromboemboli.      Patchy areas of consolidation in the right middle lobe, new from prior.  Correlate clinically for pneumonia or aspiration.    Mediastinal lymph node enlargement, similar compared to prior.    Additional findings as above.      Electronically signed by: Héctor Miranda MD  Date:    01/11/2024  Time:    02:25               Narrative:    EXAMINATION:  CTA CHEST NON CORONARY (PE STUDIES)    CLINICAL HISTORY:  Pulmonary embolism (PE) suspected, positive D-dimer;    TECHNIQUE:  Low dose axial images, sagittal and coronal reformations were obtained from the thoracic inlet to the lung bases following the IV administration of 70 mL of Omnipaque 350.  Contrast timing was  optimized to evaluate the pulmonary arteries.  MIP images were performed.    COMPARISON:  05/23/2023.    FINDINGS:    Good contrast bolus opacification of the pulmonary arteries. No acute pulmonary thromboembolism. Mild mediastinal lymph node enlargement, similar compared to 05/23/2023. No pericardial effusion or right pleural effusion.  Minimal left pleural effusion, decreased compared to prior.  Hyperinflated lungs with chronic emphysematous changes similar to prior.  Patchy areas of airspace consolidation present in the right middle lobe, new from prior.  Additional chronic changes in both lungs, similar compared to prior.  Limited images through the upper abdomen demonstrate G-tube and partially imaged bilateral upper pole renal cysts.  Atherosclerotic and ectatic aorta and coronary atherosclerotic calcifications, similar to prior                                       X-Ray Chest AP Portable (Final result)  Result time 01/11/24 01:10:45      Final result by Shaheen Joseph MD (01/11/24 01:10:45)                   Impression:      Abnormal airspace opacification and patchy consolidative change in the right lower lung zone which may be due to aspiration or pneumonia.  Similar chronic left basilar opacity with blunting of the left costophrenic angle.      Electronically signed by: Shaheen Joseph MD  Date:    01/11/2024  Time:    01:10               Narrative:    EXAMINATION:  XR CHEST AP PORTABLE    CLINICAL HISTORY:  Chest Pain;    TECHNIQUE:  Single frontal view of the chest was performed.    COMPARISON:  11/24/2023.    FINDINGS:  Cardiac silhouette is stable in size.  Tracheostomy tube is in similar position.  Lungs are symmetrically expanded.  There is chronic blunting of the left costophrenic angle which may be due to atelectasis or scarring and/or small left pleural effusion.  There is new abnormal airspace opacification and patchy consolidation seen in the right lower lung zone.  No pneumothorax.  No  acute osseous abnormality identified.                                             Assessment/Plan:      * Acute on chronic respiratory failure with hypoxia  Patient admitted with Hypoxic which is Acute on Chronic.  he is on home oxygen at 6 LPM trach collar. Signs/symptoms of respiratory failure include- increased work of breathing and respiratory distress present on admission.   - Labs and images were reviewed. Patient Has not has a recent ABG.   - Supplemental oxygen was provided and noted-  trach collar    - Respiratory failure is due to- CHF and Pneumonia   - CT with RML PNA. Trach aspirate suspected Pseudomonas. Continue vanc, zosyn for now- likely can drop vanc tomorrow   - BNP higher than ever before. TTE normal EF. NSTEMI present. Continue lasix 40mg IV daily- can likely change to PO tomorrow  - CTA no PE  - improving from admit      Pneumonia  RML infiltrate  Trach aspirate with suspect Pseudomonas  - continue vanc, zosyn   - likely DC vanc tomorrow if culture no Staph       Normocytic anemia  Patient's anemia is currently controlled. Has not received any PRBCs to date. Etiology likely d/t  chronic disease  Current CBC reviewed-   Lab Results   Component Value Date    HGB 9.2 (L) 01/12/2024    HCT 30.8 (L) 01/12/2024     Monitor serial CBC and transfuse if patient becomes hemodynamically unstable, symptomatic or H/H drops below 7/21.    Acute on chronic diastolic heart failure  Patient admitted with shortness of breath. His BNP is higher than ever before despite him appearing euvolemic.      BNP  Recent Labs   Lab 01/11/24  0021   BNP 2,467*       CT RML infiltrate  Recent Labs   Lab 01/11/24  0855   TROPONINI 3.344*       EKG not able to see in MUSE- will need to find. Per Cardiology, no STEMI    Continue 40mg IV lasix QD. Monitor UOP. Likely change to PO lasix tomorrow  TTE normal EF, potential diastolic dysfunction  Cardiology consulted for NSTEMI- no ischemic eval planned         NSTEMI (non-ST elevated  myocardial infarction)  Admitted with NSTEMI. No chest pain.  Recent Labs   Lab 01/11/24  0855   TROPONINI 3.344*       EKG with ischemic changes   He has know CAD  Started asa, plavix, heparin gtt, statin.   TTE EF 50-55%  Cardiology consulted- no plans for ischemic evaluation given complications with LHC <1 year ago        PEG (percutaneous endoscopic gastrostomy) status  Patient noted to have a percutaneous endoscopic gastrostomy tube in place. I have personally inspected the tube.Tube was placed prior to this admission There are no signs of drainage or infection around the site. The tube is patent. Medications have converted to liquid form if available.  Routine care to be done by wound care and nursing staff.   - resumed tube feeds         Tracheostomy present  Noted      ACP (advance care planning)  Advance Care Planning    Date: 01/11/2024  Full code status          Severe protein-calorie malnutrition  Nutrition consulted. Most recent weight and BMI monitored-     Measurements:  Wt Readings from Last 1 Encounters:   01/11/24 59.9 kg (132 lb)   Body mass index is 19.49 kg/m².    Patient has been screened and assessed by RD.    Interventions/Recommendations (treatment strategy):     - continue tube feeds    Other hyperlipidemia  Continue statin      Primary hypertension  Chronic, controlled. Latest blood pressure and vitals reviewed-     Temp:  [96.7 °F (35.9 °C)-98.3 °F (36.8 °C)]   Pulse:  []   Resp:  [19-39]   BP: ()/(61-87)   SpO2:  [95 %-100 %] .   Home meds for hypertension were reviewed and noted below.   Hypertension Medications               furosemide (LASIX) 40 MG tablet Take 0.5 tablets (20 mg total) by mouth once daily.    metoprolol tartrate (LOPRESSOR) 25 MG tablet Take 0.5 tablets (12.5 mg total) by mouth 2 (two) times daily.          - hold BP Rx because BP is borderline low     Will utilize p.r.n. blood pressure medication only if patient's blood pressure greater than 180/110 and  he develops symptoms such as worsening chest pain or shortness of breath.    Coronary artery disease involving native coronary artery of native heart without angina pectoris  Patient with known CAD s/p stent placement, which is uncontrolled Will continue ASA, Plavix, and Statin and monitor for S/Sx of angina/ACS. Continue to monitor on telemetry.   - see NSTEMI    Peripheral vascular disease  Known PAD from last angiogram 3/2023  Continue asa, plavix, statin         VTE Risk Mitigation (From admission, onward)           Ordered     heparin 25,000 units in dextrose 5% 250 mL (100 units/mL) infusion LOW INTENSITY nomogram - [facility]  Continuous        Question:  Begin at (units/kg/hr)  Answer:  12    01/13/24 0819     IP VTE LOW RISK PATIENT  Once         01/11/24 1648     heparin 25,000 units in dextrose 5% (100 units/ml) IV bolus from bag - ADDITIONAL PRN BOLUS - 60 units/kg (max bolus 4000 units)  As needed (PRN)        Question:  Heparin Infusion Adjustment (DO NOT MODIFY ANSWER)  Answer:  [email or web address]\Images\Pharmacy\HeparinInfusions\heparin LOW INTENSITY nomogram for [facility] [id number]    01/11/24 0823     heparin 25,000 units in dextrose 5% (100 units/ml) IV bolus from bag - ADDITIONAL PRN BOLUS - 30 units/kg (max bolus 4000 units)  As needed (PRN)        Question:  Heparin Infusion Adjustment (DO NOT MODIFY ANSWER)  Answer:  [email or web address]\Images\Pharmacy\HeparinInfusions\heparin LOW INTENSITY nomogram for [facility] [id number]    01/11/24 0823     Place sequential compression device  Until discontinued         01/11/24 0225                    Discharge Planning   NISA: 1/16/2024     Code Status: Full Code   Is the patient medically ready for discharge?:     Reason for patient still in hospital (select all that apply): Patient trending condition and Treatment  Discharge Plan A: Home Health            Critical care time spent on the evaluation and treatment of severe organ dysfunction, review of pertinent labs and  imaging studies, discussions with consulting providers and discussions with patient/family: 0 minutes.      Andry Zhao MD  Department of Hospital Medicine   [facility] - Intensive Care

## 2024-01-14 NOTE — PROGRESS NOTES
[facility] Intensive Care  Cardiology  Progress Note    Patient Name: Fareed Richard Jr.  MRN: [id number]  Admission Date: 1/11/2024  Hospital Length of Stay: 3 days  Code Status: Full Code   Attending Physician: Andry Zhao MD   Primary Care Physician: Kodi Tubbs MD  Expected Discharge Date: 1/16/2024  Principal Problem:Acute on chronic respiratory failure with hypoxia    Subjective:       Interval History:  Continues to be chest pain-free      Past Medical History:   Diagnosis Date    Cancer     skin to Rt forearm and face    COPD (chronic obstructive pulmonary disease)     Hyperlipidemia     Hypertension     Pseudoaneurysm        Past Surgical History:   Procedure Laterality Date    ABDOMINAL SURGERY      stents placed in liver and large intestines, per patient    ANGIOGRAPHY OF AORTIC ARCH  3/27/2023    Procedure: Aortogram, Aortic Arch;  Surgeon: Tim Bhatt MD;  Location: [facility] CATH LAB;  Service: Cardiology;;    AORTOGRAPHY WITH SERIALOGRAPHY N/A 3/27/2023    Procedure: AORTOGRAM, WITH SERIALOGRAPHY;  Surgeon: Tim Bhatt MD;  Location: [facility] CATH LAB;  Service: Cardiology;  Laterality: N/A;    CAROTID STENT      COLONOSCOPY N/A 09/27/2022    Procedure: COLONOSCOPY;  Surgeon: Donnie Peterson MD;  [facility]  Service: Endoscopy;  Laterality: N/A;    COLONOSCOPY N/A 09/30/2022    Procedure: COLONOSCOPY;  Surgeon: Joy Cabrera MD;  Location: [facility] ENDO (2ND FLR);  Service: Endoscopy;  Laterality: N/A;    CORONARY STENT PLACEMENT  01/2000    DISSECTION OF NECK Bilateral 01/04/2022    Procedure: DISSECTION, NECK;  Surgeon: Naeem Smalls MD;  Location: [facility] OR 2ND FLR;  Service: ENT;  Laterality: Bilateral;    ESOPHAGOGASTRODUODENOSCOPY N/A 09/27/2022    Procedure: EGD (ESOPHAGOGASTRODUODENOSCOPY);  Surgeon: Donnie Peterson MD;  Location: [facility] ENDO (2ND FLR);  Service: Endoscopy;  Laterality: N/A;    ESOPHAGOGASTRODUODENOSCOPY N/A 09/30/2022    Procedure: EGD  (ESOPHAGOGASTRODUODENOSCOPY);  Surgeon: Joy Cabrera MD;  Location: [facility] ENDO (2ND FLR);  Service: Endoscopy;  Laterality: N/A;    ESOPHAGOGASTRODUODENOSCOPY W/ PEG N/A 01/04/2022    Procedure: EGD, WITH PEG TUBE INSERTION;  Surgeon: Anthony Calabrese MD;  [facility];  Service: General;  Laterality: N/A;    EYE SURGERY      Cataract bilateral    femoral stents      bilateral    FLAP PROCEDURE N/A 01/03/2022    Procedure: CREATION, FREE FLAP;  Surgeon: Naeem Smalls MD;  [facility];  Service: ENT;  Laterality: N/A;    FLAP PROCEDURE Right 01/04/2022    Procedure: CREATION, FREE FLAP;  Surgeon: Stacey Conde MD;  [facility];  Service: ENT;  Laterality: Right;  Ischemic start 1203  Ischemic stop 1353    GLOSSECTOMY N/A 01/04/2022    Procedure: GLOSSECTOMY;  Surgeon: Naeem Smalls MD;  Location: [facility] OR [facility] FLR;  Service: ENT;  Laterality: N/A;    LEFT HEART CATHETERIZATION Left 3/23/2023    Procedure: Left heart cath;  Surgeon: Tacos Benitez MD;  Location: [facility] CATH LAB;  Service: Cardiology;  Laterality: Left;    PERCUTANEOUS TRANSLUMINAL BALLOON ANGIOPLASTY OF CORONARY ARTERY Left 3/27/2023    Procedure: Angioplasty-coronary;  Surgeon: Tim Bhatt MD;  Location: [facility] CATH LAB;  Service: Cardiology;  Laterality: Left;  not before 9am, R rad access, 6 Fr EBU 3.5 guide    RADICAL NECK DISSECTION Left 01/03/2022    Procedure: DISSECTION, NECK, RADICAL;  Surgeon: Naeem Smalls MD;  [facility];  Service: ENT;  Laterality: Left;    SKIN BIOPSY      SKIN CANCER EXCISION      STENT, CORONARY, MULTI VESSEL  3/27/2023    Procedure: Stent, Coronary, Multi Vessel;  Surgeon: Tim Bhatt MD;  Location: [facility] CATH LAB;  Service: Cardiology;;    TRACHEOTOMY N/A 01/04/2022    Procedure: TRACHEOTOMY;  Surgeon: Naeem Smalls MD;  [facility];  Service: ENT;  Laterality: N/A;    VASCULAR SURGERY         Review of  patient's allergies indicates:   Allergen Reactions    Ativan [lorazepam] Anxiety       No current facility-administered medications on file prior to encounter.     Current Outpatient Medications on File Prior to Encounter   Medication Sig    albuterol-ipratropium (DUO-NEB) 2.5 mg-0.5 mg/3 mL nebulizer solution Take 3 mLs by nebulization every 4 (four) hours as needed for Wheezing. Rescue    ascorbic acid, vitamin C, (VITAMIN C) 100 MG tablet Take 100 mg by mouth once daily.    aspirin 81 MG Chew Take 1 tablet (81 mg total) by mouth once daily.    atorvastatin (LIPITOR) 40 MG tablet 1 tablet (40 mg total) by Per G Tube route every evening. (Patient taking differently: 40 mg by Per G Tube route once daily.)    bisacodyL (DULCOLAX) 10 mg Supp Place 1 suppository (10 mg total) rectally daily as needed.    clopidogreL (PLAVIX) 75 mg tablet Take 1 tablet (75 mg total) by mouth once daily. (Patient taking differently: Take 75 mg by mouth nightly.)    ferrous sulfate 325 (65 FE) MG EC tablet Take 325 mg by mouth 3 (three) times daily with meals.    FLUoxetine (PROZAC) 20 mg/5 mL (4 mg/mL) solution Take 20 mg by mouth once daily.    folic acid (FOLVITE) 1 MG tablet Take 1 mg by mouth once daily.    furosemide (LASIX) 40 MG tablet Take 0.5 tablets (20 mg total) by mouth once daily. (Patient taking differently: Take 20 mg by mouth nightly.)    glycopyrrolate (CUVPOSA) 1 mg/5 mL (0.2 mg/mL) Soln Take 5 mLs (1 mg total) by mouth 3 (three) times daily as needed (TO REDUCE SECRETIONS).    hydrOXYzine HCL (ATARAX) 25 MG tablet SMARTSI.5 Tablet(s) Gastro Tube Every 8 Hours PRN    melatonin (MELATIN) 3 mg tablet 1 tablet (3 mg total) by Per G Tube route nightly.    metoprolol tartrate (LOPRESSOR) 25 MG tablet Take 0.5 tablets (12.5 mg total) by mouth 2 (two) times daily.    omeprazole (PRILOSEC) 40 MG capsule Take 40 mg (contents of one capsule) twice daily through PEG tube. Mix contents of capsule with 10 mL applesauce. (Patient  taking differently: 40 mg 2 (two) times a day. Take 40 mg (contents of one capsule) twice daily through PEG tube. Mix contents of capsule with 10 mL applesauce.)    polyethylene glycol (GLYCOLAX) 17 gram PwPk 17 g by Per G Tube route 2 (two) times daily.    sodium chloride 3% 3 % nebulizer solution Take 4 mLs by nebulization 2 (two) times a day.    thiamine (VITAMIN B-1) 50 MG tablet Take 50 mg by mouth once daily.    WIXELA INHUB 250-50 mcg/dose diskus inhaler SMARTSI Puff(s) By Mouth Every 12 Hours    [DISCONTINUED] losartan (COZAAR) 50 MG tablet 1 tablet (50 mg total) by Per G Tube route once daily.     Family History    None       Tobacco Use    Smoking status: Former     Current packs/day: 0.00     Average packs/day: 2.0 packs/day for 55.0 years (110.0 ttl pk-yrs)     Types: Cigarettes     Start date: 1963     Quit date: 2018     Years since quittin.7    Smokeless tobacco: Never    Tobacco comments:     3/3 ppd x 40 yrs. Currently 3-4 cigarettes daily .He is trying  to quit. Is using a Vapor cigarettes  2-3 x's a day.   Substance and Sexual Activity    Alcohol use: Not Currently    Drug use: No    Sexual activity: Not Currently     Review of Systems   Constitutional:  Positive for fatigue and fever.   HENT:  Negative for congestion.    Eyes:  Negative for discharge.   Respiratory:  Positive for cough and shortness of breath. Negative for chest tightness.    Cardiovascular:  Negative for chest pain and leg swelling.   Gastrointestinal:  Negative for abdominal pain.   Endocrine: Negative for polyphagia.   Genitourinary:  Negative for difficulty urinating.   Musculoskeletal:  Positive for arthralgias.   Skin:  Negative for color change.   Allergic/Immunologic: Negative for food allergies.   Neurological:  Negative for headaches.   Psychiatric/Behavioral:  Negative for confusion.      Objective:     Vital Signs (Most Recent):  Temp: 97.7 °F (36.5 °C) (24 1101)  Pulse: 92 (24  1200)  Resp: (!) 27 (01/14/24 1200)  BP: 103/70 (01/14/24 1200)  SpO2: 96 % (01/14/24 1200) Vital Signs (24h Range):  Temp:  [97.7 °F (36.5 °C)-98.4 °F (36.9 °C)] 97.7 °F (36.5 °C)  Pulse:  [] 92  Resp:  [22-45] 27  SpO2:  [92 %-100 %] 96 %  BP: ()/(65-85) 103/70     Weight: 59.9 kg (132 lb)  Body mass index is 19.49 kg/m².     Physical Exam  Vitals and nursing note reviewed.   Constitutional:       General: He is not in acute distress.     Appearance: He is ill-appearing (chronically). He is not toxic-appearing.   HENT:      Head: Normocephalic and atraumatic.      Mouth/Throat:      Mouth: Mucous membranes are moist.   Cardiovascular:      Rate and Rhythm: Normal rate and regular rhythm.   Pulmonary:      Effort: Pulmonary effort is normal.      Breath sounds: Normal breath sounds. No wheezing, rhonchi or rales.      Comments: 10L trach collar  Abdominal:      General: Bowel sounds are normal. There is no distension.      Palpations: Abdomen is soft. There is no mass.      Tenderness: There is no abdominal tenderness. There is no guarding.      Comments: PEG in place   Musculoskeletal:      Right lower leg: No edema.      Left lower leg: No edema.   Skin:     General: Skin is warm and dry.   Neurological:      Mental Status: He is alert. Mental status is at baseline.                  DATA:     Laboratory:  CBC:  Recent Labs   Lab 01/12/24  0635 01/13/24  0241 01/14/24  0449   WBC 12.29 9.56 8.41   Hemoglobin 9.2 L 9.5 L 10.1 L   Hematocrit 30.8 L 30.9 L 33.8 L   Platelets 177 172 200         CHEMISTRIES:  Recent Labs   Lab 05/12/22  0343 05/14/22  0724 05/17/22  1007 08/24/22  1000 01/12/24  0635 01/13/24  0241 01/14/24  0449   Glucose 110 96 162 H   < > 167 H 328 H 87   Sodium 135 L 136 133 L   < > 134 L 133 L 140   Potassium 4.4 4.1 5.0   < > 3.6 3.0 L 3.5   BUN 24 H 18 15   < > 25 H 22 20   Creatinine 0.7 0.7 0.7   < > 0.9 1.0 0.8   eGFR if African American >60.0 >60 >60.0  --   --   --   --    eGFR  if non  >60.0 >60 >60.0  --   --   --   --    Calcium 9.1 9.6 9.5   < > 8.9 8.7 9.6   Magnesium 2.0  --  2.0   < > 2.1 1.7 2.0    < > = values in this interval not displayed.         CARDIAC BIOMARKERS:  Recent Labs   Lab 01/11/24  0021 01/11/24  0311 01/11/24  0855   Troponin I 0.393 H 1.011 H 3.344 H         COAGS:  Recent Labs   Lab 04/02/23  0322 11/26/23  0505 01/11/24  0855   INR 1.0 1.1 1.1         LIPIDS/LFTS:  Recent Labs   Lab 08/29/23  1522 10/20/23  0815 01/12/24  0635 01/13/24  0241 01/14/24  0449   Cholesterol 85 L  --  95 L  --   --    Triglycerides 68  --  60  --   --    HDL 25 L  --  37 L  --   --    LDL Cholesterol 46.4 L  --  46.0 L  --   --    Non-HDL Cholesterol 60  --  58  --   --    AST 20   < > 55 H 38 22   ALT 28   < > 29 33 25    < > = values in this interval not displayed.         Hemoglobin A1C   Date Value Ref Range Status   08/29/2023 5.8 (H) 4.0 - 5.6 % Final     Comment:     ADA Screening Guidelines:  5.7-6.4%  Consistent with prediabetes  >or=6.5%  Consistent with diabetes    High levels of fetal hemoglobin interfere with the HbA1C  assay. Heterozygous hemoglobin variants (HbS, HgC, etc)do  not significantly interfere with this assay.   However, presence of multiple variants may affect accuracy.     09/27/2022 6.3 (H) 4.0 - 5.6 % Final     Comment:     ADA Screening Guidelines:  5.7-6.4%  Consistent with prediabetes  >or=6.5%  Consistent with diabetes    High levels of fetal hemoglobin interfere with the HbA1C  assay. Heterozygous hemoglobin variants (HbS, HgC, etc)do  not significantly interfere with this assay.   However, presence of multiple variants may affect accuracy.         TSH  Recent Labs   Lab 05/23/23  0849 07/10/23  1040 08/29/23  1522   TSH 4.379 H 3.610 4.460 H         The ASCVD Risk score (Caitlyn DUPONT, et al., 2019) failed to calculate for the following reasons:    The patient has a prior MI or stroke diagnosis       BNP    Lab Results   Component Value  Date/Time    BNP 2,467 (H) 01/11/2024 12:21 AM    BNP 1,708 (H) 11/20/2023 04:06 PM     (H) 10/20/2023 08:15 AM    BNP 1,185 (H) 04/18/2023 07:07 AM    BNP 2,543 (H) 03/21/2023 03:25 PM     (H) 03/18/2023 11:06 AM     (H) 09/23/2022 12:47 PM     (H) 09/14/2022 04:59 AM    BNP 98 05/14/2022 07:25 AM     (H) 04/22/2022 05:04 PM    BNP 1,607 (H) 04/19/2022 09:29 AM     (H) 03/21/2022 02:39 PM     (H) 11/02/2019 02:52 AM     (H) 12/27/2018 01:29 PM            ECHO    Results for orders placed during the hospital encounter of 01/11/24    Echo    Interpretation Summary    Left Ventricle: The left ventricle is normal in size. Mildly increased wall thickness. There is concentric hypertrophy. Normal wall motion. There is low normal systolic function with a visually estimated ejection fraction of 50 - 55%. Unable to assess diastolic function due to E-A fusion.    Right Ventricle: Normal right ventricular cavity size. Systolic function is normal.    Left Atrium: Left atrium is moderately dilated.    Right Atrium: Right atrium is mildly dilated.    Mitral Valve: There is mild regurgitation.    Tricuspid Valve: There is mild regurgitation.    Pulmonary Artery: The estimated pulmonary artery systolic pressure is 13 mmHg.    IVC/SVC: Normal venous pressure at 3 mmHg.      STRESS TEST    No results found for this or any previous visit.        CATH    Results for orders placed during the hospital encounter of 03/18/23    Cardiac catheterization    Conclusion  Description of the findings of the procedure: uneventful LHC/cor angio/Ao angio/iliac angio/PCI prox LAD BRADLEY x1, PCI mid LCx BRADLEY x1/R rad vasband/RFA man comp.    Findings/Key Components:  LVEDP: 3mmHg  LVEF: 60%    Aortic arch: severe calcific atherosclerosis  Ost innominate artery 80%  Prox R SCA 90%, 53mmHg gradient across stenoses.  Prox-mid ICA patent  Distal aortoiliac bifurcation with patent stents in ЮЛИЯ  bilaterally and possible severe ISR in R ЮЛИЯ.    Severe diffuse cor Ca++  Dominance: Right  LM: MLI  LAD: prox 95% at D1, mid  Ramus: MLI  LCx: mid 90%  RCA: not injected, diffuse disease, severe dist RCA stenosis (see Dr. Benitez cath report    PCI prox LAD:  Preop ASA/Plavix, intraop heparin  Wired LAD/D1  Predil 2.5x12, 2.75x15 NC  Stent 3.0x24 Synergy BRADLEY  Post IVUS with mild underexpansion mid stent  Postdil 3.25x20 NC at 22atm  Excellent angiographic result, T3 flow, 0% residual stenosis    PCI mid LCx  Predil 2.5x12  Stent 2.75x20 Synergy BRADLEY 14 fidelia  Unavble to pass IVUS due to severe prox LCx tortuosity  Excellent angiographic result, T3 flow, 0% residual stenosis    Hemostasis:  R Radial band  RFA man comp  Assessment and Plan:     Brief HPI:     * Acute on chronic respiratory failure with hypoxia        Pneumonia        NSTEMI (non-ST elevated myocardial infarction)  TYPE 1 VERSUS TYPE 2.  PATIENT IS CHEST PAIN-FREE.  STATES THAT PRIOR TO HIS PCI WHEN HE HAD THE NON-STEMI LAST TIME, HE DID HAVE CHEST PAINS.  EKG DOES SHOW ST DEPRESSIONS.  CASE DISCUSSED IN DETAIL WITH THE DR. TAMAYO WHO IS THE PATIENT'S PRIMARY CARDIOLOGIST.  LAST PCI WAS COMPLICATED BY PULMONARY AND RETROPERITONEAL HEMORRHAGE.  WHEN THE PATIENT SAW DR. TAMAYO IN CLINIC AS AN OUTPATIENT, DR. TAMAYO HAD RECOMMENDED PALLIATIVE CARE CONSULT.  TODAY WITH ME AND DR. TAMAYO HAD A DETAILED DISCUSSION AND CONSIDERING HIS POOR STATE OF HEALTH, MULTIPLE COMORBIDITIES, THE FACT THAT HE IS CHEST PAIN-FREE AND THE FACT THAT HE HAD MULTIPLE COMPLICATIONS CHRONIC PULMONARY HEMORRHAGE AS WELL AS RETROPERITONEAL HEMORRHAGE AFTER HIS LAST CATHETERIZATION AND PCI BY DR. WEST, WE RECOMMEND MEDICAL MANAGEMENT AND PALLIATIVE CARE CONSULT AT THE CURRENT TIME.  HE IS CURRENTLY COMFORTABLE AND CHEST PAIN-FREE.  CONTINUE MEDICAL MANAGEMENT.  IF DEVELOPS LIFESTYLE LIMITING ANGINA, THEN DISCUSSION OF HIGH-RISK PCI WILL BE HELD.    Has been chest pain-free.   Continue medical management.        PEG (percutaneous endoscopic gastrostomy) status        Tracheostomy present        Severe protein-calorie malnutrition          Other hyperlipidemia        Coronary artery disease involving native coronary artery of native heart without angina pectoris  ALTHOUGH PATIENT'S TROPONINS ELEVATED, HE IS CHEST PAIN-FREE.  STATES THAT PRIOR TO HIS STENTS IN MARCH HE HAD CHEST PAINS AND CURRENTLY IS CHEST PAIN-FREE.  IT IS POSSIBLE THAT HIS TROPONIN COULD BE ELEVATED SECONDARY TO HIS HYPOXEMIA ON PRESENTATION.  HOWEVER DOES HAVE LATERAL ST DEPRESSIONS AND ISCHEMIC LOOKING EKG.    1/12 has continued to be chest pain-free.    Peripheral vascular disease              VTE Risk Mitigation (From admission, onward)           Ordered     heparin 25,000 units in dextrose 5% 250 mL (100 units/mL) infusion LOW INTENSITY nomogram - [facility]  Continuous        Question:  Begin at (units/kg/hr)  Answer:  12    01/13/24 0819     IP VTE LOW RISK PATIENT  Once         01/11/24 1648     heparin 25,000 units in dextrose 5% (100 units/ml) IV bolus from bag - ADDITIONAL PRN BOLUS - 60 units/kg (max bolus 4000 units)  As needed (PRN)        Question:  Heparin Infusion Adjustment (DO NOT MODIFY ANSWER)  Answer:  \\[email or web address]\epic\Images\Pharmacy\HeparinInfusions\heparin LOW INTENSITY nomogram for [facility] [id number].pdf    01/11/24 0823     heparin 25,000 units in dextrose 5% (100 units/ml) IV bolus from bag - ADDITIONAL PRN BOLUS - 30 units/kg (max bolus 4000 units)  As needed (PRN)        Question:  Heparin Infusion Adjustment (DO NOT MODIFY ANSWER)  Answer:  \\[email or web address]\epic\Images\Pharmacy\HeparinInfusions\heparin LOW INTENSITY nomogram for [facility] [id number].pdf    01/11/24 0823     Place sequential compression device  Until discontinued         01/11/24 0225                    Arturo Malone MD  Cardiology  [facility] - Intensive Care    Critical Care Time:  35 minutes     Critical care was time spent personally by me on the  following activities: development of treatment plan with patient or surrogate and bedside caregivers, discussions with consultants, evaluation of patient's response to treatment, examination of patient, ordering and performing treatments and interventions, ordering and review of laboratory studies, ordering and review of radiographic studies, pulse oximetry, re-evaluation of patient's condition. This critical care time did not overlap with that of any other provider or involve time for any procedures.

## 2024-01-14 NOTE — NURSING
[facility]  Nurses Note -- 4 Eyes      1/14/2024       Skin assessed on: Q Shift      [x] No Pressure Injuries Present    [x]Prevention Measures Documented    [] Yes LDA  for Pressure Injury Previously documented     [] Yes New Pressure Injury Discovered   [] LDA for New Pressure Injury Added      Attending RN:  America NAILS RN     Second RN:  KAMILAH Zhou

## 2024-01-14 NOTE — PLAN OF CARE
Patient remain in ICU, AAOX4, VSS, Tube feeding continue, oxygen via trach collar @ 10l/60%. POC reviewed  Problem: Adult Inpatient Plan of Care  Goal: Plan of Care Review  Outcome: Ongoing, Progressing  Goal: Patient-Specific Goal (Individualized)  Outcome: Ongoing, Progressing  Goal: Absence of Hospital-Acquired Illness or Injury  Outcome: Ongoing, Progressing  Goal: Optimal Comfort and Wellbeing  Outcome: Ongoing, Progressing  Goal: Readiness for Transition of Care  Outcome: Ongoing, Progressing     Problem: Skin Injury Risk Increased  Goal: Skin Health and Integrity  Outcome: Ongoing, Progressing     Problem: Fall Injury Risk  Goal: Absence of Fall and Fall-Related Injury  Outcome: Ongoing, Progressing     Problem: Fluid Imbalance (Pneumonia)  Goal: Fluid Balance  Outcome: Ongoing, Progressing     Problem: Infection (Pneumonia)  Goal: Resolution of Infection Signs and Symptoms  Outcome: Ongoing, Progressing     Problem: Respiratory Compromise (Pneumonia)  Goal: Effective Oxygenation and Ventilation  Outcome: Ongoing, Progressing     Problem: Adjustment to Illness (Acute Coronary Syndrome)  Goal: Optimal Adaptation to Illness  Outcome: Ongoing, Progressing     Problem: Dysrhythmia (Acute Coronary Syndrome)  Goal: Normalized Cardiac Rhythm  Outcome: Ongoing, Progressing     Problem: Cardiac-Related Pain (Acute Coronary Syndrome)  Goal: Absence of Cardiac-Related Pain  Outcome: Ongoing, Progressing     Problem: Hemodynamic Instability (Acute Coronary Syndrome)  Goal: Effective Cardiac Pump Function  Outcome: Ongoing, Progressing     Problem: Tissue Perfusion (Acute Coronary Syndrome)  Goal: Adequate Tissue Perfusion  Outcome: Ongoing, Progressing     Problem: Arrhythmia/Dysrhythmia (Cardiac Catheterization)  Goal: Stable Heart Rate and Rhythm  Outcome: Ongoing, Progressing     Problem: Bleeding (Cardiac Catheterization)  Goal: Absence of Bleeding  Outcome: Ongoing, Progressing     Problem: Contrast-Induced Injury  Risk (Cardiac Catheterization)  Goal: Absence of Contrast-Induced Injury  Outcome: Ongoing, Progressing     Problem: Embolism (Cardiac Catheterization)  Goal: Absence of Embolism Signs and Symptoms  Outcome: Ongoing, Progressing     Problem: Ongoing Anesthesia/Sedation Effects (Cardiac Catheterization)  Goal: Anesthesia/Sedation Recovery  Outcome: Ongoing, Progressing     Problem: Pain (Cardiac Catheterization)  Goal: Acceptable Pain Control  Outcome: Ongoing, Progressing     Problem: Vascular Access Protection (Cardiac Catheterization)  Goal: Absence of Vascular Access Complication  Outcome: Ongoing, Progressing

## 2024-01-15 LAB
ALBUMIN SERPL BCP-MCNC: 2.6 G/DL (ref 3.5–5.2)
ALP SERPL-CCNC: 78 U/L (ref 55–135)
ALT SERPL W/O P-5'-P-CCNC: 21 U/L (ref 10–44)
ANION GAP SERPL CALC-SCNC: 9 MMOL/L (ref 8–16)
APTT PPP: 27.5 SEC (ref 21–32)
AST SERPL-CCNC: 21 U/L (ref 10–40)
BACTERIA BLD CULT: NORMAL
BACTERIA BLD CULT: NORMAL
BASOPHILS # BLD AUTO: 0.03 K/UL (ref 0–0.2)
BASOPHILS NFR BLD: 0.4 % (ref 0–1.9)
BILIRUB SERPL-MCNC: 0.7 MG/DL (ref 0.1–1)
BUN SERPL-MCNC: 23 MG/DL (ref 8–23)
CALCIUM SERPL-MCNC: 9.5 MG/DL (ref 8.7–10.5)
CHLORIDE SERPL-SCNC: 96 MMOL/L (ref 95–110)
CO2 SERPL-SCNC: 34 MMOL/L (ref 23–29)
CREAT SERPL-MCNC: 0.9 MG/DL (ref 0.5–1.4)
DIFFERENTIAL METHOD BLD: ABNORMAL
EOSINOPHIL # BLD AUTO: 0.2 K/UL (ref 0–0.5)
EOSINOPHIL NFR BLD: 2.8 % (ref 0–8)
ERYTHROCYTE [DISTWIDTH] IN BLOOD BY AUTOMATED COUNT: 14.7 % (ref 11.5–14.5)
EST. GFR  (NO RACE VARIABLE): >60 ML/MIN/1.73 M^2
GLUCOSE SERPL-MCNC: 91 MG/DL (ref 70–110)
HCT VFR BLD AUTO: 34.3 % (ref 40–54)
HGB BLD-MCNC: 10.1 G/DL (ref 14–18)
IMM GRANULOCYTES # BLD AUTO: 0.03 K/UL (ref 0–0.04)
IMM GRANULOCYTES NFR BLD AUTO: 0.4 % (ref 0–0.5)
LYMPHOCYTES # BLD AUTO: 0.4 K/UL (ref 1–4.8)
LYMPHOCYTES NFR BLD: 4.9 % (ref 18–48)
MAGNESIUM SERPL-MCNC: 2.1 MG/DL (ref 1.6–2.6)
MCH RBC QN AUTO: 28.6 PG (ref 27–31)
MCHC RBC AUTO-ENTMCNC: 29.4 G/DL (ref 32–36)
MCV RBC AUTO: 97 FL (ref 82–98)
MONOCYTES # BLD AUTO: 0.8 K/UL (ref 0.3–1)
MONOCYTES NFR BLD: 11.5 % (ref 4–15)
NEUTROPHILS # BLD AUTO: 5.8 K/UL (ref 1.8–7.7)
NEUTROPHILS NFR BLD: 80 % (ref 38–73)
NRBC BLD-RTO: 0 /100 WBC
PLATELET # BLD AUTO: 184 K/UL (ref 150–450)
PMV BLD AUTO: 9.9 FL (ref 9.2–12.9)
POCT GLUCOSE: 109 MG/DL (ref 70–110)
POCT GLUCOSE: 134 MG/DL (ref 70–110)
POCT GLUCOSE: 158 MG/DL (ref 70–110)
POTASSIUM SERPL-SCNC: 3.4 MMOL/L (ref 3.5–5.1)
PROT SERPL-MCNC: 6.8 G/DL (ref 6–8.4)
RBC # BLD AUTO: 3.53 M/UL (ref 4.6–6.2)
SODIUM SERPL-SCNC: 139 MMOL/L (ref 136–145)
WBC # BLD AUTO: 7.2 K/UL (ref 3.9–12.7)

## 2024-01-15 PROCEDURE — 25000003 PHARM REV CODE 250: Performed by: STUDENT IN AN ORGANIZED HEALTH CARE EDUCATION/TRAINING PROGRAM

## 2024-01-15 PROCEDURE — 94761 N-INVAS EAR/PLS OXIMETRY MLT: CPT

## 2024-01-15 PROCEDURE — 21400001 HC TELEMETRY ROOM

## 2024-01-15 PROCEDURE — 99900035 HC TECH TIME PER 15 MIN (STAT)

## 2024-01-15 PROCEDURE — 99291 CRITICAL CARE FIRST HOUR: CPT | Mod: ,,, | Performed by: INTERNAL MEDICINE

## 2024-01-15 PROCEDURE — 63600175 PHARM REV CODE 636 W HCPCS: Performed by: STUDENT IN AN ORGANIZED HEALTH CARE EDUCATION/TRAINING PROGRAM

## 2024-01-15 PROCEDURE — 25000003 PHARM REV CODE 250: Performed by: HOSPITALIST

## 2024-01-15 PROCEDURE — 99900026 HC AIRWAY MAINTENANCE (STAT)

## 2024-01-15 PROCEDURE — 94640 AIRWAY INHALATION TREATMENT: CPT

## 2024-01-15 PROCEDURE — 36415 COLL VENOUS BLD VENIPUNCTURE: CPT | Performed by: HOSPITALIST

## 2024-01-15 PROCEDURE — 99223 1ST HOSP IP/OBS HIGH 75: CPT | Mod: ,,, | Performed by: SURGERY

## 2024-01-15 PROCEDURE — 20000000 HC ICU ROOM

## 2024-01-15 PROCEDURE — 85730 THROMBOPLASTIN TIME PARTIAL: CPT | Performed by: HOSPITALIST

## 2024-01-15 PROCEDURE — 25000242 PHARM REV CODE 250 ALT 637 W/ HCPCS: Performed by: STUDENT IN AN ORGANIZED HEALTH CARE EDUCATION/TRAINING PROGRAM

## 2024-01-15 PROCEDURE — 83735 ASSAY OF MAGNESIUM: CPT | Performed by: HOSPITALIST

## 2024-01-15 PROCEDURE — 85025 COMPLETE CBC W/AUTO DIFF WBC: CPT | Performed by: HOSPITALIST

## 2024-01-15 PROCEDURE — 80053 COMPREHEN METABOLIC PANEL: CPT | Performed by: HOSPITALIST

## 2024-01-15 PROCEDURE — 27100171 HC OXYGEN HIGH FLOW UP TO 24 HOURS

## 2024-01-15 RX ADMIN — FOLIC ACID 1 MG: 1 TABLET ORAL at 09:01

## 2024-01-15 RX ADMIN — FUROSEMIDE 20 MG: 20 TABLET ORAL at 09:01

## 2024-01-15 RX ADMIN — POTASSIUM CHLORIDE 10 MEQ: 7.46 INJECTION, SOLUTION INTRAVENOUS at 10:01

## 2024-01-15 RX ADMIN — MUPIROCIN: 20 OINTMENT TOPICAL at 09:01

## 2024-01-15 RX ADMIN — IPRATROPIUM BROMIDE AND ALBUTEROL SULFATE 3 ML: 2.5; .5 SOLUTION RESPIRATORY (INHALATION) at 04:01

## 2024-01-15 RX ADMIN — CLOPIDOGREL BISULFATE 75 MG: 75 TABLET ORAL at 08:01

## 2024-01-15 RX ADMIN — POTASSIUM CHLORIDE 10 MEQ: 7.46 INJECTION, SOLUTION INTRAVENOUS at 06:01

## 2024-01-15 RX ADMIN — POTASSIUM CHLORIDE 10 MEQ: 7.46 INJECTION, SOLUTION INTRAVENOUS at 12:01

## 2024-01-15 RX ADMIN — SODIUM CHLORIDE SOLN NEBU 3% 4 ML: 3 NEBU SOLN at 07:01

## 2024-01-15 RX ADMIN — IPRATROPIUM BROMIDE AND ALBUTEROL SULFATE 3 ML: 2.5; .5 SOLUTION RESPIRATORY (INHALATION) at 07:01

## 2024-01-15 RX ADMIN — IPRATROPIUM BROMIDE AND ALBUTEROL SULFATE 3 ML: 2.5; .5 SOLUTION RESPIRATORY (INHALATION) at 08:01

## 2024-01-15 RX ADMIN — POTASSIUM CHLORIDE 10 MEQ: 7.46 INJECTION, SOLUTION INTRAVENOUS at 08:01

## 2024-01-15 RX ADMIN — IPRATROPIUM BROMIDE AND ALBUTEROL SULFATE 3 ML: 2.5; .5 SOLUTION RESPIRATORY (INHALATION) at 03:01

## 2024-01-15 RX ADMIN — MELATONIN TAB 3 MG 6 MG: 3 TAB at 08:01

## 2024-01-15 RX ADMIN — IPRATROPIUM BROMIDE AND ALBUTEROL SULFATE 3 ML: 2.5; .5 SOLUTION RESPIRATORY (INHALATION) at 11:01

## 2024-01-15 RX ADMIN — POTASSIUM CHLORIDE 10 MEQ: 7.46 INJECTION, SOLUTION INTRAVENOUS at 04:01

## 2024-01-15 RX ADMIN — PIPERACILLIN AND TAZOBACTAM 4.5 G: 4; .5 INJECTION, POWDER, LYOPHILIZED, FOR SOLUTION INTRAVENOUS; PARENTERAL at 08:01

## 2024-01-15 RX ADMIN — SODIUM CHLORIDE SOLN NEBU 3% 4 ML: 3 NEBU SOLN at 08:01

## 2024-01-15 RX ADMIN — ATORVASTATIN CALCIUM 40 MG: 40 TABLET, FILM COATED ORAL at 09:01

## 2024-01-15 RX ADMIN — PIPERACILLIN AND TAZOBACTAM 4.5 G: 4; .5 INJECTION, POWDER, LYOPHILIZED, FOR SOLUTION INTRAVENOUS; PARENTERAL at 04:01

## 2024-01-15 RX ADMIN — FLUOXETINE HYDROCHLORIDE 20 MG: 20 SOLUTION ORAL at 09:01

## 2024-01-15 RX ADMIN — POTASSIUM CHLORIDE 10 MEQ: 7.46 INJECTION, SOLUTION INTRAVENOUS at 02:01

## 2024-01-15 RX ADMIN — ASPIRIN 81 MG CHEWABLE TABLET 81 MG: 81 TABLET CHEWABLE at 09:01

## 2024-01-15 RX ADMIN — PIPERACILLIN AND TAZOBACTAM 4.5 G: 4; .5 INJECTION, POWDER, LYOPHILIZED, FOR SOLUTION INTRAVENOUS; PARENTERAL at 12:01

## 2024-01-15 NOTE — PLAN OF CARE
Patient remain in ICU as boarder,  AAoX4, tube feeding continue, no new injury and skin breakdown in a shift. POC reviewed with patient and spouse expressed understanding.  Problem: Adult Inpatient Plan of Care  Goal: Plan of Care Review  Outcome: Ongoing, Progressing  Goal: Patient-Specific Goal (Individualized)  Outcome: Ongoing, Progressing  Goal: Absence of Hospital-Acquired Illness or Injury  Outcome: Ongoing, Progressing  Goal: Optimal Comfort and Wellbeing  Outcome: Ongoing, Progressing  Goal: Readiness for Transition of Care  Outcome: Ongoing, Progressing     Problem: Skin Injury Risk Increased  Goal: Skin Health and Integrity  Outcome: Ongoing, Progressing     Problem: Fall Injury Risk  Goal: Absence of Fall and Fall-Related Injury  Outcome: Ongoing, Progressing     Problem: Fluid Imbalance (Pneumonia)  Goal: Fluid Balance  Outcome: Ongoing, Progressing     Problem: Infection (Pneumonia)  Goal: Resolution of Infection Signs and Symptoms  Outcome: Ongoing, Progressing     Problem: Respiratory Compromise (Pneumonia)  Goal: Effective Oxygenation and Ventilation  Outcome: Ongoing, Progressing     Problem: Adjustment to Illness (Acute Coronary Syndrome)  Goal: Optimal Adaptation to Illness  Outcome: Ongoing, Progressing     Problem: Dysrhythmia (Acute Coronary Syndrome)  Goal: Normalized Cardiac Rhythm  Outcome: Ongoing, Progressing     Problem: Cardiac-Related Pain (Acute Coronary Syndrome)  Goal: Absence of Cardiac-Related Pain  Outcome: Ongoing, Progressing     Problem: Hemodynamic Instability (Acute Coronary Syndrome)  Goal: Effective Cardiac Pump Function  Outcome: Ongoing, Progressing     Problem: Tissue Perfusion (Acute Coronary Syndrome)  Goal: Adequate Tissue Perfusion  Outcome: Ongoing, Progressing     Problem: Arrhythmia/Dysrhythmia (Cardiac Catheterization)  Goal: Stable Heart Rate and Rhythm  Outcome: Ongoing, Progressing     Problem: Bleeding (Cardiac Catheterization)  Goal: Absence of  Bleeding  Outcome: Ongoing, Progressing     Problem: Contrast-Induced Injury Risk (Cardiac Catheterization)  Goal: Absence of Contrast-Induced Injury  Outcome: Ongoing, Progressing     Problem: Embolism (Cardiac Catheterization)  Goal: Absence of Embolism Signs and Symptoms  Outcome: Ongoing, Progressing     Problem: Ongoing Anesthesia/Sedation Effects (Cardiac Catheterization)  Goal: Anesthesia/Sedation Recovery  Outcome: Ongoing, Progressing     Problem: Pain (Cardiac Catheterization)  Goal: Acceptable Pain Control  Outcome: Ongoing, Progressing     Problem: Vascular Access Protection (Cardiac Catheterization)  Goal: Absence of Vascular Access Complication  Outcome: Ongoing, Progressing

## 2024-01-15 NOTE — HPI
Mr Fareed Richard Jr. Is a 73-year-old man with a past medical history of squamous cell carcinoma of the tongue s/p tracheostomy and PEG, CAD, COPD, hyperlipidemia, anxiety who is admitted to the ICU with acute on chronic respiratory failure. He has had G tube in place for years. On 12/24, he presented to the ED with G tube dislodgement. He previously had a 20 fr tube and this was replaced in the ED with an 18 fr tube. Tube feeds are going fine. No leaking around tube site. General surgery is consulted to see if he needs a larger tube.

## 2024-01-15 NOTE — NURSING
[facility] Intensive Care  ICU Shift Summary  Date: 1/15/2024      Prehospitalization: Home  Admit Date / LOS : 1/11/2024/ 4 days    Diagnosis: Acute on chronic respiratory failure with hypoxia    Consults:        Active: Cardio, GI, Palliative, and SW       Needed: N/A     Code Status: Full Code   Advanced Directive: Not Received    LDA:  Lines/Drains/Airways       Drain  Duration                  Gastrostomy/Enterostomy 01/04/22 Percutaneous endoscopic gastrostomy (PEG)  days    Male External Urinary Catheter 01/13/24 1800 1 day              Airway  Duration             Adult Surgical Airway 03/16/23 1014 Shiley Cuffed 6.0 305 days              Peripheral Intravenous Line  Duration                  Peripheral IV - Single Lumen 01/10/24 2341 18 G Left;Posterior Forearm 4 days         Peripheral IV - Single Lumen 01/11/24 0013 20 G Right Antecubital 4 days                  Central Lines/Site/Justification:Patient Does Not Have Central Line  Urinary Cath/Order/Justification:Patient Does Not Have Urinary Catheter    Vasopressors/Infusions:        GOALS: Volume/ Hemodynamic: N/A                     RASS: 0  alert and calm    Pain Management: none       Pain Controlled: yes     Rhythm: NSR    Respiratory Device: Trach collar  Oxygen Concentration (%):  [40-70] 40                 Most Recent SBT/ SAT: N/A       MOVE Screen: FAIL  ICU Liberation: yes    VTE Prophylaxis: Pharm  Mobility: Bedrest  Stress Ulcer Prophylaxis: No    Isolation: No active isolations    Dietary:   Current Diet Order   Procedures    Diet NPO Except for: Sips with Medication     Order Specific Question:   Except for     Answer:   Sips with Medication      Tolerance: yes  Advancement: no    I & O (24h):    Intake/Output Summary (Last 24 hours) at 1/15/2024 1707  Last data filed at 1/15/2024 1700  Gross per 24 hour   Intake 1347.8 ml   Output 850 ml   Net 497.8 ml        Restraints: No    Significant Dates:  Post Op Date: N/A  Rescue Date:  "N/A  Imaging/ Diagnostics: N/A    Noteworthy Labs:  none    COVID Test: (--)  CBC/Anemia Labs: Coags:    Recent Labs   Lab 01/14/24  0449 01/15/24  0359   WBC 8.41 7.20   HGB 10.1* 10.1*   HCT 33.8* 34.3*    184   MCV 96 97   RDW 14.8* 14.7*    Recent Labs   Lab 01/11/24  0855 01/11/24  1557 01/14/24  0449 01/15/24  0359   INR 1.1  --   --   --    APTT 26.3   < > 27.1 27.5    < > = values in this interval not displayed.        Chemistries:   Recent Labs   Lab 01/12/24  0635 01/13/24  0241 01/14/24  0449 01/15/24  0359   *   < > 140 139   K 3.6   < > 3.5 3.4*   CL 94*   < > 95 96   CO2 31*   < > 32* 34*   BUN 25*   < > 20 23   CREATININE 0.9   < > 0.8 0.9   CALCIUM 8.9   < > 9.6 9.5   PROT 6.6   < > 7.0 6.8   BILITOT 0.5   < > 0.6 0.7   ALKPHOS 76   < > 73 78   ALT 29   < > 25 21   AST 55*   < > 22 21   MG 2.1   < > 2.0 2.1   PHOS 3.0  --   --   --     < > = values in this interval not displayed.        Cardiac Enzymes: Ejection Fractions:    No results for input(s): "CPK", "CPKMB", "MB", "TROPONINI" in the last 72 hours. EF   Date Value Ref Range Status   03/20/2023 60 % Final        POCT Glucose: HbA1c:    Recent Labs   Lab 01/15/24  0019 01/15/24  1126 01/15/24  1658   POCTGLUCOSE 109 158* 134*    Hemoglobin A1C   Date Value Ref Range Status   08/29/2023 5.8 (H) 4.0 - 5.6 % Final     Comment:     ADA Screening Guidelines:  5.7-6.4%  Consistent with prediabetes  >or=6.5%  Consistent with diabetes    High levels of fetal hemoglobin interfere with the HbA1C  assay. Heterozygous hemoglobin variants (HbS, HgC, etc)do  not significantly interfere with this assay.   However, presence of multiple variants may affect accuracy.             ICU LOS 4d 14h  Level of Care: Critical Care    Chart Check: 12 HR Done  Shift Summary/Plan for the shift: none  "

## 2024-01-15 NOTE — ASSESSMENT & PLAN NOTE
Cancer Staging   Squamous cell cancer of tongue  Staging form: Oral Cavity, AJCC 8th Edition  - Clinical stage from 1/2/2022: Stage NAFISA (cT2, cN2a, cM0) - Signed by Naeem Smalls MD on 1/2/2022  - Pathologic stage from 1/4/2022: Stage IVB (pT4a, pN3b, cM0) - Signed by Christopher Jay MD on 2/16/2022

## 2024-01-15 NOTE — PROGRESS NOTES
[facility] Medicine  Progress Note    Patient Name: Fareed Richard Jr.  MRN: [id number]  Patient Class: IP- Inpatient   Admission Date: 1/11/2024  Length of Stay: 4 days  Attending Physician: Santos Marlow, *  Primary Care Provider: Kodi Tubbs MD        Subjective:     Principal Problem:Acute on chronic respiratory failure with hypoxia        HPI:  Mr Fareed Richard Jr. 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 He is on 6L trach collar at home and receives tube feeding. He had shortness of breath, fever, and increased trach output. Denies chest pain.     In ED, he was tachycardic and tachypneic with SpO2 85% on 8L. He was given albuterol, vanc/zosyn, solumedrol. He was admitted to ICU.     Overview/Hospital Course:  Mr Fareed Richard Jr. is a 73 y.o. man who was admitted with acute on chronic hypoxic respiratory failure due to pneumonia, suspect recurrent Pseudomonas. Also with NSTEMI on admit in setting of known CAD. Started vanc, zosyn. Weaned O2. Cardiology consulted- due to severe CAD and complications with last angiogram <1 year ago, will plan medical management with heparin gtt, asa, plavix, and will NOT pursue ischemic evaluation. He is improving. Stepped down to floor.     Persistent Pseudomonas infection, on V+Z, reduced to Cefepime today. Stop Heparin gtt this morning at 48h. Pseudomonas not susceptible to Cefepime, and neither is the Achromobacter that grew out. Achromobacter ONLY sensitive to Zosyn,   Consulted ID for Abx management.  Still on FIO2 at 60%,continue with HFO and frequent suctioning,pulmonology is on case,  Patient want remains full code.        Interval History: remains on FIO2 at 60%.  Review of Systems   Respiratory:  Negative for shortness of breath.    Cardiovascular:  Negative for chest pain.    Gastrointestinal:  Negative for abdominal pain.   Genitourinary:  Negative for difficulty urinating.   Psychiatric/Behavioral:  Negative for confusion.      Objective:     Vital Signs (Most Recent):  Temp: 97.8 °F (36.6 °C) (01/15/24 0701)  Pulse: 82 (01/15/24 1000)  Resp: 17 (01/15/24 1000)  BP: 113/75 (01/15/24 1000)  SpO2: 100 % (01/15/24 1000) Vital Signs (24h Range):  Temp:  [97.7 °F (36.5 °C)-98 °F (36.7 °C)] 97.8 °F (36.6 °C)  Pulse:  [] 82  Resp:  [17-47] 17  SpO2:  [88 %-100 %] 100 %  BP: ()/(62-80) 113/75     Weight: 59.9 kg (132 lb)  Body mass index is 19.49 kg/m².    Intake/Output Summary (Last 24 hours) at 1/15/2024 1103  Last data filed at 1/15/2024 0900  Gross per 24 hour   Intake 737.44 ml   Output 950 ml   Net -212.56 ml           Physical Exam  Vitals and nursing note reviewed.   Constitutional:       General: He is not in acute distress.     Appearance: He is ill-appearing (chronically). He is not toxic-appearing.   Neck:      Comments: Trach in place  Cardiovascular:      Rate and Rhythm: Normal rate and regular rhythm.   Pulmonary:      Effort: Pulmonary effort is normal.      Breath sounds: Normal breath sounds.      Comments: 10L/40% trach collar  Abdominal:      General: Bowel sounds are normal.      Palpations: Abdomen is soft.      Comments: PEG   Musculoskeletal:      Right lower leg: No edema.      Left lower leg: No edema.   Skin:     General: Skin is warm and dry.   Neurological:      Mental Status: He is alert. Mental status is at baseline.             Significant Labs: All pertinent labs within the past 24 hours have been reviewed.    Significant Imaging: I have reviewed all pertinent imaging results/findings within the past 24 hours.    Assessment/Plan:      * Acute on chronic respiratory failure with hypoxia  Patient admitted with Hypoxic which is Acute on Chronic.  he is on home oxygen at 6 LPM trach collar. Signs/symptoms of respiratory failure include- increased  work of breathing and respiratory distress present on admission.   - Labs and images were reviewed. Patient Has not has a recent ABG.   - Supplemental oxygen was provided and noted-  trach collar    - Respiratory failure is due to- CHF and Pneumonia   - CT with RML PNA. Trach aspirate suspected Pseudomonas. Continue vanc, zosyn for now- likely can drop vanc tomorrow   - BNP higher than ever before. TTE normal EF. NSTEMI present. Continue lasix 40mg IV daily- can likely change to PO tomorrow  - CTA no PE  - improving from admit  Consulted ID for Abx management.  Still on FIO2 at 60%,continue with HFO and frequent suctioning,pulmonology is on case,        Pneumonia  RML infiltrate  Trach aspirate with suspect Pseudomonas  - continue vanc, zosyn   - likely DC vanc tomorrow if culture no Staph       Normocytic anemia  Patient's anemia is currently controlled. Has not received any PRBCs to date. Etiology likely d/t  chronic disease  Current CBC reviewed-   Lab Results   Component Value Date    HGB 9.2 (L) 01/12/2024    HCT 30.8 (L) 01/12/2024     Monitor serial CBC and transfuse if patient becomes hemodynamically unstable, symptomatic or H/H drops below 7/21.    Acute on chronic diastolic heart failure  Patient admitted with shortness of breath. His BNP is higher than ever before despite him appearing euvolemic.      BNP  Recent Labs   Lab 01/11/24  0021   BNP 2,467*       CT RML infiltrate  Recent Labs   Lab 01/11/24  0855   TROPONINI 3.344*       EKG not able to see in MUSE- will need to find. Per Cardiology, no STEMI    Continue 40mg IV lasix QD. Monitor UOP. Likely change to PO lasix tomorrow  TTE normal EF, potential diastolic dysfunction  Cardiology consulted for NSTEMI- no ischemic eval planned         NSTEMI (non-ST elevated myocardial infarction)  Admitted with NSTEMI. No chest pain.  Recent Labs   Lab 01/11/24  0855   TROPONINI 3.344*       EKG with ischemic changes   He has know CAD  Started asa, plavix,  heparin gtt, statin.   TTE EF 50-55%  Cardiology consulted- no plans for ischemic evaluation given complications with LHC <1 year ago        PEG (percutaneous endoscopic gastrostomy) status  Patient noted to have a percutaneous endoscopic gastrostomy tube in place. I have personally inspected the tube.Tube was placed prior to this admission There are no signs of drainage or infection around the site. The tube is patent. Medications have converted to liquid form if available.  Routine care to be done by wound care and nursing staff.   - resumed tube feeds         Tracheostomy present  Noted      ACP (advance care planning)  Advance Care Planning    Date: 01/11/2024  Full code status          Severe protein-calorie malnutrition  Nutrition consulted. Most recent weight and BMI monitored-     Measurements:  Wt Readings from Last 1 Encounters:   01/11/24 59.9 kg (132 lb)   Body mass index is 19.49 kg/m².    Patient has been screened and assessed by RD.    Interventions/Recommendations (treatment strategy):     - continue tube feeds    Secondary malignant neoplasm of cervical lymph node    Cancer Staging   Squamous cell cancer of tongue  Staging form: Oral Cavity, AJCC 8th Edition  - Clinical stage from 1/2/2022: Stage NAFISA (cT2, cN2a, cM0) - Signed by Naeem Smalls MD on 1/2/2022  - Pathologic stage from 1/4/2022: Stage IVB (pT4a, pN3b, cM0) - Signed by Christopher Jay MD on 2/16/2022        COPD exacerbation   On nebulizer     Other hyperlipidemia  Continue statin      Primary hypertension  Chronic, controlled. Latest blood pressure and vitals reviewed-     Temp:  [96.7 °F (35.9 °C)-98.3 °F (36.8 °C)]   Pulse:  []   Resp:  [19-39]   BP: ()/(61-87)   SpO2:  [95 %-100 %] .   Home meds for hypertension were reviewed and noted below.   Hypertension Medications               furosemide (LASIX) 40 MG tablet Take 0.5 tablets (20 mg total) by mouth once daily.    metoprolol tartrate (LOPRESSOR) 25 MG tablet Take  0.5 tablets (12.5 mg total) by mouth 2 (two) times daily.          - hold BP Rx because BP is borderline low     Will utilize p.r.n. blood pressure medication only if patient's blood pressure greater than 180/110 and he develops symptoms such as worsening chest pain or shortness of breath.    Coronary artery disease involving native coronary artery of native heart without angina pectoris  Patient with known CAD s/p stent placement, which is uncontrolled Will continue ASA, Plavix, and Statin and monitor for S/Sx of angina/ACS. Continue to monitor on telemetry.   - see NSTEMI    Peripheral vascular disease  Known PAD from last angiogram 3/2023  Continue asa, plavix, statin         VTE Risk Mitigation (From admission, onward)           Ordered     heparin 25,000 units in dextrose 5% 250 mL (100 units/mL) infusion LOW INTENSITY nomogram - [facility]  Continuous        Question:  Begin at (units/kg/hr)  Answer:  12    01/13/24 0819     IP VTE LOW RISK PATIENT  Once         01/11/24 1648     heparin 25,000 units in dextrose 5% (100 units/ml) IV bolus from bag - ADDITIONAL PRN BOLUS - 60 units/kg (max bolus 4000 units)  As needed (PRN)        Question:  Heparin Infusion Adjustment (DO NOT MODIFY ANSWER)  Answer:  \\[email or web address]\epic\Images\Pharmacy\HeparinInfusions\heparin LOW INTENSITY nomogram for [facility] [id number].pdf    01/11/24 0823     heparin 25,000 units in dextrose 5% (100 units/ml) IV bolus from bag - ADDITIONAL PRN BOLUS - 30 units/kg (max bolus 4000 units)  As needed (PRN)        Question:  Heparin Infusion Adjustment (DO NOT MODIFY ANSWER)  Answer:  \[email or web address]\epic\Images\Pharmacy\HeparinInfusions\heparin LOW INTENSITY nomogram for [facility] [id number]    01/11/24 0823     Place sequential compression device  Until discontinued         01/11/24 0225                    Discharge Planning   NISA: 1/16/2024     Code Status: Full Code   Is the patient medically ready for discharge?:     Reason for patient still in hospital (select  all that apply): Patient trending condition  Discharge Plan A: Home Health            Critical care time spent on the evaluation and treatment of severe organ dysfunction, review of pertinent labs and imaging studies, discussions with consulting providers and discussions with patient/family:  over 45  minutes.      Santos Marlow MD  Department of Hospital Medicine   [facility] - Intensive Care     Declined

## 2024-01-15 NOTE — ASSESSMENT & PLAN NOTE
Mr Fareed Richard Jr. Is a 73-year-old man with a past medical history of squamous cell carcinoma of the tongue s/p tracheostomy and PEG, CAD, COPD, hyperlipidemia, anxiety who is admitted to ICU due to a cute on chronic respiratory failure. Surgery consulted to assess if G tube should be upsized. Tube is functioning appropriately, no leakage. He does not need a new G tube.

## 2024-01-15 NOTE — CONSULTS
[facility] - Intensive Care  General Surgery  Consult Note    Patient Name: Fareed Richard Jr.  MRN: [id number]  Code Status: Full Code  Admission Date: 1/11/2024  Hospital Length of Stay: 4 days  Attending Physician: Santos Marlow, *  Primary Care Provider: Kodi Tubbs MD    Patient information was obtained from patient and ER records.     Inpatient consult to General Surgery  Consult performed by: Marcos Rader MD  Consult ordered by: Andry Zhao MD        Subjective:     Principal Problem: Acute on chronic respiratory failure with hypoxia    History of Present Illness: Mr Fareed Richard Jr. Is a 73-year-old man with a past medical history of squamous cell carcinoma of the tongue s/p tracheostomy and PEG, CAD, COPD, hyperlipidemia, anxiety who is admitted to the ICU with acute on chronic respiratory failure. He has had G tube in place for years. On 12/24, he presented to the ED with G tube dislodgement. He previously had a 20 fr tube and this was replaced in the ED with an 18 fr tube. Tube feeds are going fine. No leaking around tube site. General surgery is consulted to see if he needs a larger tube.     No current facility-administered medications on file prior to encounter.     Current Outpatient Medications on File Prior to Encounter   Medication Sig    albuterol-ipratropium (DUO-NEB) 2.5 mg-0.5 mg/3 mL nebulizer solution Take 3 mLs by nebulization every 4 (four) hours as needed for Wheezing. Rescue    ascorbic acid, vitamin C, (VITAMIN C) 100 MG tablet Take 100 mg by mouth once daily.    aspirin 81 MG Chew Take 1 tablet (81 mg total) by mouth once daily.    atorvastatin (LIPITOR) 40 MG tablet 1 tablet (40 mg total) by Per G Tube route every evening. (Patient taking differently: 40 mg by Per G Tube route once daily.)    bisacodyL (DULCOLAX) 10 mg Supp Place 1 suppository (10 mg total) rectally daily as needed.    clopidogreL (PLAVIX) 75 mg tablet Take 1 tablet (75 mg total) by mouth once daily.  (Patient taking differently: Take 75 mg by mouth nightly.)    ferrous sulfate 325 (65 FE) MG EC tablet Take 325 mg by mouth 3 (three) times daily with meals.    FLUoxetine (PROZAC) 20 mg/5 mL (4 mg/mL) solution Take 20 mg by mouth once daily.    folic acid (FOLVITE) 1 MG tablet Take 1 mg by mouth once daily.    furosemide (LASIX) 40 MG tablet Take 0.5 tablets (20 mg total) by mouth once daily. (Patient taking differently: Take 20 mg by mouth nightly.)    glycopyrrolate (CUVPOSA) 1 mg/5 mL (0.2 mg/mL) Soln Take 5 mLs (1 mg total) by mouth 3 (three) times daily as needed (TO REDUCE SECRETIONS).    hydrOXYzine HCL (ATARAX) 25 MG tablet SMARTSI.5 Tablet(s) Gastro Tube Every 8 Hours PRN    melatonin (MELATIN) 3 mg tablet 1 tablet (3 mg total) by Per G Tube route nightly.    metoprolol tartrate (LOPRESSOR) 25 MG tablet Take 0.5 tablets (12.5 mg total) by mouth 2 (two) times daily.    omeprazole (PRILOSEC) 40 MG capsule Take 40 mg (contents of one capsule) twice daily through PEG tube. Mix contents of capsule with 10 mL applesauce. (Patient taking differently: 40 mg 2 (two) times a day. Take 40 mg (contents of one capsule) twice daily through PEG tube. Mix contents of capsule with 10 mL applesauce.)    polyethylene glycol (GLYCOLAX) 17 gram PwPk 17 g by Per G Tube route 2 (two) times daily.    sodium chloride 3% 3 % nebulizer solution Take 4 mLs by nebulization 2 (two) times a day.    thiamine (VITAMIN B-1) 50 MG tablet Take 50 mg by mouth once daily.    WIXELA INHUB 250-50 mcg/dose diskus inhaler SMARTSI Puff(s) By Mouth Every 12 Hours    [DISCONTINUED] losartan (COZAAR) 50 MG tablet 1 tablet (50 mg total) by Per G Tube route once daily.       Review of patient's allergies indicates:   Allergen Reactions    Ativan [lorazepam] Anxiety       Past Medical History:   Diagnosis Date    Cancer     skin to Rt forearm and face    COPD (chronic obstructive pulmonary disease)     Hyperlipidemia     Hypertension      Pseudoaneurysm      Past Surgical History:   Procedure Laterality Date    ABDOMINAL SURGERY      stents placed in liver and large intestines, per patient    ANGIOGRAPHY OF AORTIC ARCH  3/27/2023    Procedure: Aortogram, Aortic Arch;  Surgeon: Tim Bhatt MD;  Location: [facility] CATH LAB;  Service: Cardiology;;    AORTOGRAPHY WITH SERIALOGRAPHY N/A 3/27/2023    Procedure: AORTOGRAM, WITH SERIALOGRAPHY;  Surgeon: Tim Bhatt MD;  Location: [facility] CATH LAB;  Service: Cardiology;  Laterality: N/A;    CAROTID STENT      COLONOSCOPY N/A 09/27/2022    Procedure: COLONOSCOPY;  Surgeon: Donnie Peterson MD;  Location: [facility] ENDO (2ND FLR);  Service: Endoscopy;  Laterality: N/A;    COLONOSCOPY N/A 09/30/2022    Procedure: COLONOSCOPY;  Surgeon: Joy Cabrera MD;  Location: [facility] ENDO (2ND FLR);  Service: Endoscopy;  Laterality: N/A;    CORONARY STENT PLACEMENT  01/2000    DISSECTION OF NECK Bilateral 01/04/2022    Procedure: DISSECTION, NECK;  Surgeon: Naeem Smalls MD;  Location: [facility] OR 2ND FLR;  Service: ENT;  Laterality: Bilateral;    ESOPHAGOGASTRODUODENOSCOPY N/A 09/27/2022    Procedure: EGD (ESOPHAGOGASTRODUODENOSCOPY);  Surgeon: Donnie Peterson MD;  Location: [facility] ENDO (2ND FLR);  Service: Endoscopy;  Laterality: N/A;    ESOPHAGOGASTRODUODENOSCOPY N/A 09/30/2022    Procedure: EGD (ESOPHAGOGASTRODUODENOSCOPY);  Surgeon: Joy Cabrera MD;  Location: [facility] ENDO (2ND FLR);  Service: Endoscopy;  Laterality: N/A;    ESOPHAGOGASTRODUODENOSCOPY W/ PEG N/A 01/04/2022    Procedure: EGD, WITH PEG TUBE INSERTION;  Surgeon: Anthony Calabrese MD;  Location: [facility] OR 2ND FLR;  Service: General;  Laterality: N/A;    EYE SURGERY      Cataract bilateral    femoral stents      bilateral    FLAP PROCEDURE N/A 01/03/2022    Procedure: CREATION, FREE FLAP;  Surgeon: Naeem Smalls MD;  Location: [facility] OR 2ND FLR;  Service: ENT;  Laterality: N/A;    FLAP PROCEDURE Right 01/04/2022    Procedure: CREATION, FREE FLAP;  Surgeon:  Stacey Conde MD;  [facility];  Service: ENT;  Laterality: Right;  Ischemic start 1203  Ischemic stop 1353    GLOSSECTOMY N/A 2022    Procedure: GLOSSECTOMY;  Surgeon: Naeem Smalls MD;  [facility];  Service: ENT;  Laterality: N/A;    LEFT HEART CATHETERIZATION Left 3/23/2023    Procedure: Left heart cath;  Surgeon: Tacos Benitez MD;  Location: [facility] CATH LAB;  Service: Cardiology;  Laterality: Left;    PERCUTANEOUS TRANSLUMINAL BALLOON ANGIOPLASTY OF CORONARY ARTERY Left 3/27/2023    Procedure: Angioplasty-coronary;  Surgeon: Tim Bhatt MD;  Location: [facility] CATH LAB;  Service: Cardiology;  Laterality: Left;  not before 9am, R rad access, 6 Fr EBU 3.5 guide    RADICAL NECK DISSECTION Left 2022    Procedure: DISSECTION, NECK, RADICAL;  Surgeon: Naeem Smalls MD;  Location: [facility] OR [address] [place] [address];  Service: ENT;  Laterality: Left;    SKIN BIOPSY      SKIN CANCER EXCISION      STENT, CORONARY, MULTI VESSEL  3/27/2023    Procedure: Stent, Coronary, Multi Vessel;  Surgeon: Tim Bhatt MD;  Location: [facility] CATH LAB;  Service: Cardiology;;    TRACHEOTOMY N/A 2022    Procedure: TRACHEOTOMY;  Surgeon: Naeem Smalls MD;  Location: [address];  Service: ENT;  Laterality: N/A;    VASCULAR SURGERY       Family History    None       Tobacco Use    Smoking status: Former     Current packs/day: 0.00     Average packs/day: 2.0 packs/day for 55.0 years (110.0 ttl pk-yrs)     Types: Cigarettes     Start date: 1963     Quit date: 2018     Years since quittin.7    Smokeless tobacco: Never    Tobacco comments:     3/3 ppd x 40 yrs. Currently 3-4 cigarettes daily .He is trying  to quit. Is using a Vapor cigarettes  2-3 x's a day.   Substance and Sexual Activity    Alcohol use: Not Currently    Drug use: No    Sexual activity: Not Currently     Review of Systems   Constitutional:  Positive for fatigue and fever.   HENT:  Negative for  congestion.    Eyes:  Negative for discharge.   Respiratory:  Positive for cough and shortness of breath. Negative for chest tightness.    Cardiovascular:  Negative for chest pain and leg swelling.   Gastrointestinal:  Negative for abdominal pain.   Endocrine: Negative for polyphagia.   Genitourinary:  Negative for difficulty urinating.   Musculoskeletal:  Positive for arthralgias.   Skin:  Negative for color change.   Allergic/Immunologic: Negative for food allergies.   Neurological:  Negative for headaches.   Psychiatric/Behavioral:  Negative for confusion.      Objective:     Vital Signs (Most Recent):  Temp: 97.8 °F (36.6 °C) (01/15/24 0701)  Pulse: 90 (01/15/24 0800)  Resp: 20 (01/15/24 0800)  BP: 114/70 (01/15/24 0905)  SpO2: 100 % (01/15/24 0800) Vital Signs (24h Range):  Temp:  [97.7 °F (36.5 °C)-98 °F (36.7 °C)] 97.8 °F (36.6 °C)  Pulse:  [] 90  Resp:  [20-47] 20  SpO2:  [88 %-100 %] 100 %  BP: ()/(62-80) 114/70     Weight: 59.9 kg (132 lb)  Body mass index is 19.49 kg/m².     Physical Exam  Vitals and nursing note reviewed.   Constitutional:       General: He is not in acute distress.     Appearance: He is ill-appearing (chronically). He is not toxic-appearing.   HENT:      Head: Normocephalic and atraumatic.      Mouth/Throat:      Mouth: Mucous membranes are moist.   Cardiovascular:      Rate and Rhythm: Normal rate and regular rhythm.   Pulmonary:      Effort: Pulmonary effort is normal.      Breath sounds: Normal breath sounds. No wheezing, rhonchi or rales.      Comments: trach collar  Abdominal:      General: Bowel sounds are normal. There is no distension.      Palpations: Abdomen is soft. There is no mass.      Tenderness: There is no abdominal tenderness. There is no guarding.      Comments: G tube in place. No leakage around tube   Musculoskeletal:      Right lower leg: No edema.      Left lower leg: No edema.   Skin:     General: Skin is warm and dry.   Neurological:      Mental  Status: He is alert. Mental status is at baseline.            I have reviewed all pertinent lab results within the past 24 hours.  CBC:   Recent Labs   Lab 01/15/24  0359   WBC 7.20   RBC 3.53*   HGB 10.1*   HCT 34.3*      MCV 97   MCH 28.6   MCHC 29.4*     CMP:   Recent Labs   Lab 01/15/24  0359   GLU 91   CALCIUM 9.5   ALBUMIN 2.6*   PROT 6.8      K 3.4*   CO2 34*   CL 96   BUN 23   CREATININE 0.9   ALKPHOS 78   ALT 21   AST 21   BILITOT 0.7       Significant Diagnostics:  I have reviewed all pertinent imaging results/findings within the past 24 hours.    Assessment/Plan:     PEG (percutaneous endoscopic gastrostomy) status  Mr Fareed Richard Jr. Is a 73-year-old man with a past medical history of squamous cell carcinoma of the tongue s/p tracheostomy and PEG, CAD, COPD, hyperlipidemia, anxiety who is admitted to ICU due to a cute on chronic respiratory failure. Surgery consulted to assess if G tube should be upsized. Tube is functioning appropriately, no leakage. He does not need a new G tube.        VTE Risk Mitigation (From admission, onward)           Ordered     heparin 25,000 units in dextrose 5% 250 mL (100 units/mL) infusion LOW INTENSITY nomogram - [facility]  Continuous        Question:  Begin at (units/kg/hr)  Answer:  12    01/13/24 0819     IP VTE LOW RISK PATIENT  Once         01/11/24 1648     heparin 25,000 units in dextrose 5% (100 units/ml) IV bolus from bag - ADDITIONAL PRN BOLUS - 60 units/kg (max bolus 4000 units)  As needed (PRN)        Question:  Heparin Infusion Adjustment (DO NOT MODIFY ANSWER)  Answer:  \\[email or web address]\epic\Images\Pharmacy\HeparinInfusions\heparin LOW INTENSITY nomogram for [facility] [id number]    01/11/24 0823     heparin 25,000 units in dextrose 5% (100 units/ml) IV bolus from bag - ADDITIONAL PRN BOLUS - 30 units/kg (max bolus 4000 units)  As needed (PRN)        Question:  Heparin Infusion Adjustment (DO NOT MODIFY ANSWER)  Answer:   \\[email or web address]\epic\Images\Pharmacy\HeparinInfusions\heparin LOW INTENSITY nomogram for [facility] [id number].pdf    01/11/24 0823     Place sequential compression device  Until discontinued         01/11/24 5841                    Thank you for your consult. I will sign off. Please contact us if you have any additional questions.    Marcos Rader MD  General Surgery  [facility] - Intensive Care

## 2024-01-15 NOTE — SUBJECTIVE & OBJECTIVE
No current facility-administered medications on file prior to encounter.     Current Outpatient Medications on File Prior to Encounter   Medication Sig    albuterol-ipratropium (DUO-NEB) 2.5 mg-0.5 mg/3 mL nebulizer solution Take 3 mLs by nebulization every 4 (four) hours as needed for Wheezing. Rescue    ascorbic acid, vitamin C, (VITAMIN C) 100 MG tablet Take 100 mg by mouth once daily.    aspirin 81 MG Chew Take 1 tablet (81 mg total) by mouth once daily.    atorvastatin (LIPITOR) 40 MG tablet 1 tablet (40 mg total) by Per G Tube route every evening. (Patient taking differently: 40 mg by Per G Tube route once daily.)    bisacodyL (DULCOLAX) 10 mg Supp Place 1 suppository (10 mg total) rectally daily as needed.    clopidogreL (PLAVIX) 75 mg tablet Take 1 tablet (75 mg total) by mouth once daily. (Patient taking differently: Take 75 mg by mouth nightly.)    ferrous sulfate 325 (65 FE) MG EC tablet Take 325 mg by mouth 3 (three) times daily with meals.    FLUoxetine (PROZAC) 20 mg/5 mL (4 mg/mL) solution Take 20 mg by mouth once daily.    folic acid (FOLVITE) 1 MG tablet Take 1 mg by mouth once daily.    furosemide (LASIX) 40 MG tablet Take 0.5 tablets (20 mg total) by mouth once daily. (Patient taking differently: Take 20 mg by mouth nightly.)    glycopyrrolate (CUVPOSA) 1 mg/5 mL (0.2 mg/mL) Soln Take 5 mLs (1 mg total) by mouth 3 (three) times daily as needed (TO REDUCE SECRETIONS).    hydrOXYzine HCL (ATARAX) 25 MG tablet SMARTSI.5 Tablet(s) Gastro Tube Every 8 Hours PRN    melatonin (MELATIN) 3 mg tablet 1 tablet (3 mg total) by Per G Tube route nightly.    metoprolol tartrate (LOPRESSOR) 25 MG tablet Take 0.5 tablets (12.5 mg total) by mouth 2 (two) times daily.    omeprazole (PRILOSEC) 40 MG capsule Take 40 mg (contents of one capsule) twice daily through PEG tube. Mix contents of capsule with 10 mL applesauce. (Patient taking differently: 40 mg 2 (two) times a day. Take 40 mg (contents of one capsule)  twice daily through PEG tube. Mix contents of capsule with 10 mL applesauce.)    polyethylene glycol (GLYCOLAX) 17 gram PwPk 17 g by Per G Tube route 2 (two) times daily.    sodium chloride 3% 3 % nebulizer solution Take 4 mLs by nebulization 2 (two) times a day.    thiamine (VITAMIN B-1) 50 MG tablet Take 50 mg by mouth once daily.    WIXELA INHUB 250-50 mcg/dose diskus inhaler SMARTSI Puff(s) By Mouth Every 12 Hours    [DISCONTINUED] losartan (COZAAR) 50 MG tablet 1 tablet (50 mg total) by Per G Tube route once daily.       Review of patient's allergies indicates:   Allergen Reactions    Ativan [lorazepam] Anxiety       Past Medical History:   Diagnosis Date    Cancer     skin to Rt forearm and face    COPD (chronic obstructive pulmonary disease)     Hyperlipidemia     Hypertension     Pseudoaneurysm      Past Surgical History:   Procedure Laterality Date    ABDOMINAL SURGERY      stents placed in liver and large intestines, per patient    ANGIOGRAPHY OF AORTIC ARCH  3/27/2023    Procedure: Aortogram, Aortic Arch;  Surgeon: Tim Bhatt MD;  Location: [facility] CATH LAB;  Service: Cardiology;;    AORTOGRAPHY WITH SERIALOGRAPHY N/A 3/27/2023    Procedure: AORTOGRAM, WITH SERIALOGRAPHY;  Surgeon: Tim Bhatt MD;  Location: [facility] CATH LAB;  Service: Cardiology;  Laterality: N/A;    CAROTID STENT      COLONOSCOPY N/A 2022    Procedure: COLONOSCOPY;  Surgeon: Donnie Peterson MD;  Location: [facility] ([address] [place] [address]);  Service: Endoscopy;  Laterality: N/A;    COLONOSCOPY N/A 2022    Procedure: COLONOSCOPY;  Surgeon: Joy Cabrera MD;  [facility]  Service: Endoscopy;  Laterality: N/A;    CORONARY STENT PLACEMENT  2000    DISSECTION OF NECK Bilateral 2022    Procedure: DISSECTION, NECK;  Surgeon: Naeem Smalls MD;  [facility];  Service: ENT;  Laterality: Bilateral;    ESOPHAGOGASTRODUODENOSCOPY N/A 2022    Procedure: EGD (ESOPHAGOGASTRODUODENOSCOPY);   Surgeon: Donnie Peterson MD;  Location: [facility] ENDO (2ND FLR);  Service: Endoscopy;  Laterality: N/A;    ESOPHAGOGASTRODUODENOSCOPY N/A 09/30/2022    Procedure: EGD (ESOPHAGOGASTRODUODENOSCOPY);  Surgeon: Joy Cabrera MD;  Location: [facility] ENDO (2ND FLR);  Service: Endoscopy;  Laterality: N/A;    ESOPHAGOGASTRODUODENOSCOPY W/ PEG N/A 01/04/2022    Procedure: EGD, WITH PEG TUBE INSERTION;  Surgeon: Anthony Calaberse MD;  [facility];  Service: General;  Laterality: N/A;    EYE SURGERY      Cataract bilateral    femoral stents      bilateral    FLAP PROCEDURE N/A 01/03/2022    Procedure: CREATION, FREE FLAP;  Surgeon: Naeem Smalls MD;  [facility];  Service: ENT;  Laterality: N/A;    FLAP PROCEDURE Right 01/04/2022    Procedure: CREATION, FREE FLAP;  Surgeon: Stacey Conde MD;  [address]  Service: ENT;  Laterality: Right;  Ischemic start 1203  Ischemic stop 1353    GLOSSECTOMY N/A 01/04/2022    Procedure: GLOSSECTOMY;  Surgeon: Naeem Smalls MD;  Location: [facility] OR [address] [place] [address];  Service: ENT;  Laterality: N/A;    LEFT HEART CATHETERIZATION Left 3/23/2023    Procedure: Left heart cath;  Surgeon: Tacos Benitez MD;  Location: [facility] CATH LAB;  Service: Cardiology;  Laterality: Left;    PERCUTANEOUS TRANSLUMINAL BALLOON ANGIOPLASTY OF CORONARY ARTERY Left 3/27/2023    Procedure: Angioplasty-coronary;  Surgeon: Tim Bhatt MD;  Location: [facility] CATH LAB;  Service: Cardiology;  Laterality: Left;  not before 9am, R rad access, 6 Fr EBU 3.5 guide    RADICAL NECK DISSECTION Left 01/03/2022    Procedure: DISSECTION, NECK, RADICAL;  Surgeon: Naeem Smalls MD;  Location: [address];  Service: ENT;  Laterality: Left;    SKIN BIOPSY      SKIN CANCER EXCISION      STENT, CORONARY, MULTI VESSEL  3/27/2023    Procedure: Stent, Coronary, Multi Vessel;  Surgeon: Tim Bhatt MD;  Location: [facility] CATH LAB;  Service: Cardiology;;    TRACHEOTOMY N/A 01/04/2022     Procedure: TRACHEOTOMY;  Surgeon: Naeem Smalls MD;  Location: [facility] OR [address] [place] [address];  Service: ENT;  Laterality: N/A;    VASCULAR SURGERY       Family History    None       Tobacco Use    Smoking status: Former     Current packs/day: 0.00     Average packs/day: 2.0 packs/day for 55.0 years (110.0 ttl pk-yrs)     Types: Cigarettes     Start date: 1963     Quit date: 2018     Years since quittin.7    Smokeless tobacco: Never    Tobacco comments:     3/3 ppd x 40 yrs. Currently 3-4 cigarettes daily .He is trying  to quit. Is using a Vapor cigarettes  2-3 x's a day.   Substance and Sexual Activity    Alcohol use: Not Currently    Drug use: No    Sexual activity: Not Currently     Review of Systems   Constitutional:  Positive for fatigue and fever.   HENT:  Negative for congestion.    Eyes:  Negative for discharge.   Respiratory:  Positive for cough and shortness of breath. Negative for chest tightness.    Cardiovascular:  Negative for chest pain and leg swelling.   Gastrointestinal:  Negative for abdominal pain.   Endocrine: Negative for polyphagia.   Genitourinary:  Negative for difficulty urinating.   Musculoskeletal:  Positive for arthralgias.   Skin:  Negative for color change.   Allergic/Immunologic: Negative for food allergies.   Neurological:  Negative for headaches.   Psychiatric/Behavioral:  Negative for confusion.      Objective:     Vital Signs (Most Recent):  Temp: 97.8 °F (36.6 °C) (01/15/24 0701)  Pulse: 90 (01/15/24 0800)  Resp: 20 (01/15/24 0800)  BP: 114/70 (01/15/24 0905)  SpO2: 100 % (01/15/24 0800) Vital Signs (24h Range):  Temp:  [97.7 °F (36.5 °C)-98 °F (36.7 °C)] 97.8 °F (36.6 °C)  Pulse:  [] 90  Resp:  [20-47] 20  SpO2:  [88 %-100 %] 100 %  BP: ()/(62-80) 114/70     Weight: 59.9 kg (132 lb)  Body mass index is 19.49 kg/m².     Physical Exam  Vitals and nursing note reviewed.   Constitutional:       General: He is not in acute distress.     Appearance: He is  ill-appearing (chronically). He is not toxic-appearing.   HENT:      Head: Normocephalic and atraumatic.      Mouth/Throat:      Mouth: Mucous membranes are moist.   Cardiovascular:      Rate and Rhythm: Normal rate and regular rhythm.   Pulmonary:      Effort: Pulmonary effort is normal.      Breath sounds: Normal breath sounds. No wheezing, rhonchi or rales.      Comments: trach collar  Abdominal:      General: Bowel sounds are normal. There is no distension.      Palpations: Abdomen is soft. There is no mass.      Tenderness: There is no abdominal tenderness. There is no guarding.      Comments: G tube in place. No leakage around tube   Musculoskeletal:      Right lower leg: No edema.      Left lower leg: No edema.   Skin:     General: Skin is warm and dry.   Neurological:      Mental Status: He is alert. Mental status is at baseline.            I have reviewed all pertinent lab results within the past 24 hours.  CBC:   Recent Labs   Lab 01/15/24  0359   WBC 7.20   RBC 3.53*   HGB 10.1*   HCT 34.3*      MCV 97   MCH 28.6   MCHC 29.4*     CMP:   Recent Labs   Lab 01/15/24  0359   GLU 91   CALCIUM 9.5   ALBUMIN 2.6*   PROT 6.8      K 3.4*   CO2 34*   CL 96   BUN 23   CREATININE 0.9   ALKPHOS 78   ALT 21   AST 21   BILITOT 0.7       Significant Diagnostics:  I have reviewed all pertinent imaging results/findings within the past 24 hours.

## 2024-01-15 NOTE — NURSING
[facility] [place]  Nurses Note -- 4 Eyes      1/15/2024       Skin assessed on: Q Shift      [x] No Pressure Injuries Present    [x]Prevention Measures Documented    [] Yes LDA  for Pressure Injury Previously documented     [] Yes New Pressure Injury Discovered   [] LDA for New Pressure Injury Added      Attending RN:  America NAILS RN     Second RN:  KAMILAH Zhou

## 2024-01-15 NOTE — ASSESSMENT & PLAN NOTE
Patient admitted with Hypoxic which is Acute on Chronic.  he is on home oxygen at 6 LPM trach collar. Signs/symptoms of respiratory failure include- increased work of breathing and respiratory distress present on admission.   - Labs and images were reviewed. Patient Has not has a recent ABG.   - Supplemental oxygen was provided and noted-  trach collar    - Respiratory failure is due to- CHF and Pneumonia   - CT with RML PNA. Trach aspirate suspected Pseudomonas. Continue vanc, zosyn for now- likely can drop vanc tomorrow   - BNP higher than ever before. TTE normal EF. NSTEMI present. Continue lasix 40mg IV daily- can likely change to PO tomorrow  - CTA no PE  - improving from admit  Consulted ID for Abx management.  Still on FIO2 at 60%,continue with HFO and frequent suctioning,pulmonology is on case,

## 2024-01-15 NOTE — SUBJECTIVE & OBJECTIVE
Interval History: remains on FIO2 at 60%.  Review of Systems   Respiratory:  Negative for shortness of breath.    Cardiovascular:  Negative for chest pain.   Gastrointestinal:  Negative for abdominal pain.   Genitourinary:  Negative for difficulty urinating.   Psychiatric/Behavioral:  Negative for confusion.      Objective:     Vital Signs (Most Recent):  Temp: 97.8 °F (36.6 °C) (01/15/24 0701)  Pulse: 82 (01/15/24 1000)  Resp: 17 (01/15/24 1000)  BP: 113/75 (01/15/24 1000)  SpO2: 100 % (01/15/24 1000) Vital Signs (24h Range):  Temp:  [97.7 °F (36.5 °C)-98 °F (36.7 °C)] 97.8 °F (36.6 °C)  Pulse:  [] 82  Resp:  [17-47] 17  SpO2:  [88 %-100 %] 100 %  BP: ()/(62-80) 113/75     Weight: 59.9 kg (132 lb)  Body mass index is 19.49 kg/m².    Intake/Output Summary (Last 24 hours) at 1/15/2024 1103  Last data filed at 1/15/2024 0900  Gross per 24 hour   Intake 737.44 ml   Output 950 ml   Net -212.56 ml           Physical Exam  Vitals and nursing note reviewed.   Constitutional:       General: He is not in acute distress.     Appearance: He is ill-appearing (chronically). He is not toxic-appearing.   Neck:      Comments: Trach in place  Cardiovascular:      Rate and Rhythm: Normal rate and regular rhythm.   Pulmonary:      Effort: Pulmonary effort is normal.      Breath sounds: Normal breath sounds.      Comments: 10L/40% trach collar  Abdominal:      General: Bowel sounds are normal.      Palpations: Abdomen is soft.      Comments: PEG   Musculoskeletal:      Right lower leg: No edema.      Left lower leg: No edema.   Skin:     General: Skin is warm and dry.   Neurological:      Mental Status: He is alert. Mental status is at baseline.             Significant Labs: All pertinent labs within the past 24 hours have been reviewed.    Significant Imaging: I have reviewed all pertinent imaging results/findings within the past 24 hours.

## 2024-01-16 PROBLEM — J95.851 VENTILATOR ASSOCIATED PNEUMONIA: Status: ACTIVE | Noted: 2023-11-22

## 2024-01-16 LAB
ALBUMIN SERPL BCP-MCNC: 2.5 G/DL (ref 3.5–5.2)
ALP SERPL-CCNC: 78 U/L (ref 55–135)
ALT SERPL W/O P-5'-P-CCNC: 19 U/L (ref 10–44)
ANION GAP SERPL CALC-SCNC: 10 MMOL/L (ref 8–16)
APTT PPP: 26.6 SEC (ref 21–32)
AST SERPL-CCNC: 18 U/L (ref 10–40)
BASOPHILS # BLD AUTO: 0.02 K/UL (ref 0–0.2)
BASOPHILS NFR BLD: 0.3 % (ref 0–1.9)
BILIRUB SERPL-MCNC: 0.5 MG/DL (ref 0.1–1)
BUN SERPL-MCNC: 20 MG/DL (ref 8–23)
CALCIUM SERPL-MCNC: 9.8 MG/DL (ref 8.7–10.5)
CHLORIDE SERPL-SCNC: 97 MMOL/L (ref 95–110)
CO2 SERPL-SCNC: 33 MMOL/L (ref 23–29)
CREAT SERPL-MCNC: 0.8 MG/DL (ref 0.5–1.4)
DIFFERENTIAL METHOD BLD: ABNORMAL
EOSINOPHIL # BLD AUTO: 0.4 K/UL (ref 0–0.5)
EOSINOPHIL NFR BLD: 6.6 % (ref 0–8)
ERYTHROCYTE [DISTWIDTH] IN BLOOD BY AUTOMATED COUNT: 14.7 % (ref 11.5–14.5)
EST. GFR  (NO RACE VARIABLE): >60 ML/MIN/1.73 M^2
GLUCOSE SERPL-MCNC: 94 MG/DL (ref 70–110)
HCT VFR BLD AUTO: 32.4 % (ref 40–54)
HGB BLD-MCNC: 9.9 G/DL (ref 14–18)
IMM GRANULOCYTES # BLD AUTO: 0.03 K/UL (ref 0–0.04)
IMM GRANULOCYTES NFR BLD AUTO: 0.5 % (ref 0–0.5)
LYMPHOCYTES # BLD AUTO: 0.4 K/UL (ref 1–4.8)
LYMPHOCYTES NFR BLD: 6 % (ref 18–48)
MAGNESIUM SERPL-MCNC: 1.9 MG/DL (ref 1.6–2.6)
MCH RBC QN AUTO: 28.9 PG (ref 27–31)
MCHC RBC AUTO-ENTMCNC: 30.6 G/DL (ref 32–36)
MCV RBC AUTO: 95 FL (ref 82–98)
MONOCYTES # BLD AUTO: 0.7 K/UL (ref 0.3–1)
MONOCYTES NFR BLD: 11.4 % (ref 4–15)
NEUTROPHILS # BLD AUTO: 4.9 K/UL (ref 1.8–7.7)
NEUTROPHILS NFR BLD: 75.2 % (ref 38–73)
NRBC BLD-RTO: 0 /100 WBC
PLATELET # BLD AUTO: 201 K/UL (ref 150–450)
PMV BLD AUTO: 9.9 FL (ref 9.2–12.9)
POCT GLUCOSE: 120 MG/DL (ref 70–110)
POCT GLUCOSE: 138 MG/DL (ref 70–110)
POCT GLUCOSE: 155 MG/DL (ref 70–110)
POCT GLUCOSE: 80 MG/DL (ref 70–110)
POCT GLUCOSE: 98 MG/DL (ref 70–110)
POTASSIUM SERPL-SCNC: 4 MMOL/L (ref 3.5–5.1)
PROT SERPL-MCNC: 6.7 G/DL (ref 6–8.4)
RBC # BLD AUTO: 3.42 M/UL (ref 4.6–6.2)
SODIUM SERPL-SCNC: 140 MMOL/L (ref 136–145)
WBC # BLD AUTO: 6.5 K/UL (ref 3.9–12.7)

## 2024-01-16 PROCEDURE — 27000221 HC OXYGEN, UP TO 24 HOURS

## 2024-01-16 PROCEDURE — 99223 1ST HOSP IP/OBS HIGH 75: CPT | Mod: ,,, | Performed by: STUDENT IN AN ORGANIZED HEALTH CARE EDUCATION/TRAINING PROGRAM

## 2024-01-16 PROCEDURE — 99900035 HC TECH TIME PER 15 MIN (STAT)

## 2024-01-16 PROCEDURE — 99900026 HC AIRWAY MAINTENANCE (STAT)

## 2024-01-16 PROCEDURE — 63600175 PHARM REV CODE 636 W HCPCS: Performed by: STUDENT IN AN ORGANIZED HEALTH CARE EDUCATION/TRAINING PROGRAM

## 2024-01-16 PROCEDURE — 25000003 PHARM REV CODE 250: Performed by: STUDENT IN AN ORGANIZED HEALTH CARE EDUCATION/TRAINING PROGRAM

## 2024-01-16 PROCEDURE — 21400001 HC TELEMETRY ROOM

## 2024-01-16 PROCEDURE — 36415 COLL VENOUS BLD VENIPUNCTURE: CPT | Performed by: HOSPITALIST

## 2024-01-16 PROCEDURE — 25000003 PHARM REV CODE 250: Performed by: HOSPITALIST

## 2024-01-16 PROCEDURE — 94640 AIRWAY INHALATION TREATMENT: CPT

## 2024-01-16 PROCEDURE — 80053 COMPREHEN METABOLIC PANEL: CPT | Performed by: HOSPITALIST

## 2024-01-16 PROCEDURE — 99291 CRITICAL CARE FIRST HOUR: CPT | Mod: ,,, | Performed by: INTERNAL MEDICINE

## 2024-01-16 PROCEDURE — 85025 COMPLETE CBC W/AUTO DIFF WBC: CPT | Performed by: HOSPITALIST

## 2024-01-16 PROCEDURE — 85730 THROMBOPLASTIN TIME PARTIAL: CPT | Performed by: HOSPITALIST

## 2024-01-16 PROCEDURE — 25000242 PHARM REV CODE 250 ALT 637 W/ HCPCS: Performed by: STUDENT IN AN ORGANIZED HEALTH CARE EDUCATION/TRAINING PROGRAM

## 2024-01-16 PROCEDURE — 83735 ASSAY OF MAGNESIUM: CPT | Performed by: HOSPITALIST

## 2024-01-16 PROCEDURE — 94761 N-INVAS EAR/PLS OXIMETRY MLT: CPT

## 2024-01-16 RX ADMIN — HYDROXYZINE HYDROCHLORIDE 25 MG: 25 TABLET ORAL at 01:01

## 2024-01-16 RX ADMIN — PIPERACILLIN AND TAZOBACTAM 4.5 G: 4; .5 INJECTION, POWDER, LYOPHILIZED, FOR SOLUTION INTRAVENOUS; PARENTERAL at 08:01

## 2024-01-16 RX ADMIN — IPRATROPIUM BROMIDE AND ALBUTEROL SULFATE 3 ML: 2.5; .5 SOLUTION RESPIRATORY (INHALATION) at 11:01

## 2024-01-16 RX ADMIN — FOLIC ACID 1 MG: 1 TABLET ORAL at 09:01

## 2024-01-16 RX ADMIN — PIPERACILLIN AND TAZOBACTAM 4.5 G: 4; .5 INJECTION, POWDER, LYOPHILIZED, FOR SOLUTION INTRAVENOUS; PARENTERAL at 04:01

## 2024-01-16 RX ADMIN — IPRATROPIUM BROMIDE AND ALBUTEROL SULFATE 3 ML: 2.5; .5 SOLUTION RESPIRATORY (INHALATION) at 04:01

## 2024-01-16 RX ADMIN — MELATONIN TAB 3 MG 6 MG: 3 TAB at 08:01

## 2024-01-16 RX ADMIN — SODIUM CHLORIDE SOLN NEBU 3% 4 ML: 3 NEBU SOLN at 08:01

## 2024-01-16 RX ADMIN — ATORVASTATIN CALCIUM 40 MG: 40 TABLET, FILM COATED ORAL at 09:01

## 2024-01-16 RX ADMIN — FUROSEMIDE 20 MG: 20 TABLET ORAL at 09:01

## 2024-01-16 RX ADMIN — IPRATROPIUM BROMIDE AND ALBUTEROL SULFATE 3 ML: 2.5; .5 SOLUTION RESPIRATORY (INHALATION) at 12:01

## 2024-01-16 RX ADMIN — IPRATROPIUM BROMIDE AND ALBUTEROL SULFATE 3 ML: 2.5; .5 SOLUTION RESPIRATORY (INHALATION) at 08:01

## 2024-01-16 RX ADMIN — HYDROXYZINE HYDROCHLORIDE 25 MG: 25 TABLET ORAL at 08:01

## 2024-01-16 RX ADMIN — ASPIRIN 81 MG CHEWABLE TABLET 81 MG: 81 TABLET CHEWABLE at 09:01

## 2024-01-16 RX ADMIN — FLUOXETINE HYDROCHLORIDE 20 MG: 20 SOLUTION ORAL at 09:01

## 2024-01-16 RX ADMIN — CLOPIDOGREL BISULFATE 75 MG: 75 TABLET ORAL at 08:01

## 2024-01-16 NOTE — PROGRESS NOTES
[facility] Medicine  Progress Note    Patient Name: Fareed Richard Jr.  MRN: [id number]  Patient Class: IP- Inpatient   Admission Date: 1/11/2024  Length of Stay: 5 days  Attending Physician: Nikita Castillo III, MD  Primary Care Provider: Kodi Tubbs MD        Subjective:     Principal Problem:Acute on chronic respiratory failure with hypoxia        HPI:  Mr Fareed Richard Jr. 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 He is on 6L trach collar at home and receives tube feeding. He had shortness of breath, fever, and increased trach output. Denies chest pain.     In ED, he was tachycardic and tachypneic with SpO2 85% on 8L. He was given albuterol, vanc/zosyn, solumedrol. He was admitted to ICU.     Overview/Hospital Course:  Mr Fareed Richard Jr. is a 73 y.o. man who was admitted with acute on chronic hypoxic respiratory failure due to pneumonia, suspect recurrent Pseudomonas. Also with NSTEMI on admit in setting of known CAD. Started vanc, zosyn. Weaned O2. Cardiology consulted- due to severe CAD and complications with last angiogram <1 year ago, will plan medical management with heparin gtt, asa, plavix, and will NOT pursue ischemic evaluation. He is improving. Stepped down to floor.     Persistent Pseudomonas infection, on V+Z, reduced to Cefepime today. Stop Heparin gtt this morning at 48h. Pseudomonas not susceptible to Cefepime, and neither is the Achromobacter that grew out. Achromobacter ONLY sensitive to Zosyn,   Consulted ID for Abx management.  Still on FIO2 at 60%,continue with HFO and frequent suctioning,pulmonology is on case,  Patient want remains full code.        Interval History: feels ok. Still with a lot of secretions.     Review of Systems  Objective:     Vital Signs (Most Recent):  Temp: 97.7 °F (36.5 °C) (01/16/24  0830)  Pulse: 98 (01/16/24 1000)  Resp: (!) 30 (01/16/24 1000)  BP: 113/74 (01/16/24 1000)  SpO2: 97 % (01/16/24 1000) Vital Signs (24h Range):  Temp:  [97.7 °F (36.5 °C)-98.4 °F (36.9 °C)] 97.7 °F (36.5 °C)  Pulse:  [] 98  Resp:  [18-38] 30  SpO2:  [90 %-100 %] 97 %  BP: ()/(57-83) 113/74     Weight: 58.6 kg (129 lb 3 oz)  Body mass index is 19.08 kg/m².    Intake/Output Summary (Last 24 hours) at 1/16/2024 1027  Last data filed at 1/16/2024 0926  Gross per 24 hour   Intake 1302.34 ml   Output 1150 ml   Net 152.34 ml         Physical Exam  Vitals reviewed.   Constitutional:       General: He is not in acute distress.     Appearance: He is ill-appearing.   HENT:      Head: Normocephalic and atraumatic.      Mouth/Throat:      Mouth: Mucous membranes are dry.   Eyes:      General: No scleral icterus.     Extraocular Movements: Extraocular movements intact.   Cardiovascular:      Rate and Rhythm: Normal rate and regular rhythm.   Pulmonary:      Effort: Pulmonary effort is normal. Respiratory distress: course respirations.   Abdominal:      Palpations: Abdomen is soft.      Tenderness: There is no abdominal tenderness.   Musculoskeletal:         General: No swelling or tenderness.      Cervical back: Neck supple. No rigidity.   Skin:     General: Skin is warm and dry.   Neurological:      General: No focal deficit present.      Mental Status: He is alert and oriented to person, place, and time.   Psychiatric:         Mood and Affect: Mood normal.         Behavior: Behavior normal.             Significant Labs: All pertinent labs within the past 24 hours have been reviewed.    Significant Imaging: I have reviewed all pertinent imaging results/findings within the past 24 hours.    Assessment/Plan:      * Acute on chronic respiratory failure with hypoxia  Patient admitted with Hypoxic which is Acute on Chronic.  he is on home oxygen at 6 LPM trach collar. Signs/symptoms of respiratory failure include-  increased work of breathing and respiratory distress present on admission.   - Labs and images were reviewed. Patient Has not has a recent ABG.   - Supplemental oxygen was provided and noted-  trach collar    - Respiratory failure is due to- CHF and Pneumonia   - CT with RML PNA. Trach aspirate suspected Pseudomonas. Continue vanc, zosyn for now- likely can drop vanc tomorrow   - BNP higher than ever before. TTE normal EF. NSTEMI present. Continue lasix 40mg IV daily- can likely change to PO tomorrow  - CTA no PE  - improving from admit  Consulted ID for Abx management. Continuing with Zosyn  Still on FIO2 at 60%,continue with HFO and frequent suctioning,pulmonology is on case,        Pneumonia  RML infiltrate  Trach aspirate with suspect Pseudomonas  - continue zosyn. ID consulted. Vancomycin stopped        Normocytic anemia  Patient's anemia is currently controlled. Has not received any PRBCs to date. Etiology likely d/t  chronic disease  Current CBC reviewed-   Lab Results   Component Value Date    HGB 9.9 (L) 01/16/2024    HCT 32.4 (L) 01/16/2024     Monitor serial CBC and transfuse if patient becomes hemodynamically unstable, symptomatic or H/H drops below 7/21.    Acute on chronic diastolic heart failure  Patient admitted with shortness of breath. His BNP is higher than ever before despite him appearing euvolemic.      BNP  Recent Labs   Lab 01/11/24  0021   BNP 2,467*       CT RML infiltrate  Recent Labs   Lab 01/11/24  0855   TROPONINI 3.344*       EKG not able to see in MUSE- will need to find. Per Cardiology, no STEMI    Continue 40mg IV lasix QD. Monitor UOP. Likely change to PO lasix tomorrow  TTE normal EF, potential diastolic dysfunction  Cardiology consulted for NSTEMI- no ischemic eval planned         NSTEMI (non-ST elevated myocardial infarction)  Admitted with NSTEMI. No chest pain.  Recent Labs   Lab 01/11/24  0855   TROPONINI 3.344*       EKG with ischemic changes   He has know CAD  Started asa,  plavix, heparin gtt, statin.   TTE EF 50-55%  Cardiology consulted- no plans for ischemic evaluation given complications with [facility] <1 year ago        PEG (percutaneous endoscopic gastrostomy) status  Patient noted to have a percutaneous endoscopic gastrostomy tube in place. I have personally inspected the tube.Tube was placed prior to this admission There are no signs of drainage or infection around the site. The tube is patent. Medications have converted to liquid form if available.  Routine care to be done by wound care and nursing staff.   - resumed tube feeds         Tracheostomy present  Noted      ACP (advance care planning)  Advance Care Planning    Date: 01/11/2024  Full code status          Severe protein-calorie malnutrition  Nutrition consulted. Most recent weight and BMI monitored-     Measurements:  Wt Readings from Last 1 Encounters:   01/16/24 58.6 kg (129 lb 3 oz)   Body mass index is 19.08 kg/m².    Patient has been screened and assessed by RD.    Interventions/Recommendations (treatment strategy):     - continue tube feeds    Secondary malignant neoplasm of cervical lymph node     Cancer Staging   Squamous cell cancer of tongue  Staging form: Oral Cavity, AJCC 8th Edition  - Clinical stage from 1/2/2022: Stage NAFISA (cT2, cN2a, cM0) - Signed by Naeem Smalls MD on 1/2/2022  - Pathologic stage from 1/4/2022: Stage IVB (pT4a, pN3b, cM0) - Signed by Christopher Jay MD on 2/16/2022        COPD exacerbation   On nebulizer     Other hyperlipidemia  Continue statin      Primary hypertension  Chronic, controlled. Latest blood pressure and vitals reviewed-     Temp:  [97.7 °F (36.5 °C)-98.4 °F (36.9 °C)]   Pulse:  []   Resp:  [18-38]   BP: ()/(57-83)   SpO2:  [90 %-100 %] .   Home meds for hypertension were reviewed and noted below.   Hypertension Medications               furosemide (LASIX) 40 MG tablet Take 0.5 tablets (20 mg total) by mouth once daily.    metoprolol tartrate (LOPRESSOR) 25  MG tablet Take 0.5 tablets (12.5 mg total) by mouth 2 (two) times daily.          - hold BP Rx because BP is borderline low     Will utilize p.r.n. blood pressure medication only if patient's blood pressure greater than 180/110 and he develops symptoms such as worsening chest pain or shortness of breath.    Coronary artery disease involving native coronary artery of native heart without angina pectoris  Patient with known CAD s/p stent placement, which is uncontrolled Will continue ASA, Plavix, and Statin and monitor for S/Sx of angina/ACS. Continue to monitor on telemetry.   - see NSTEMI    Peripheral vascular disease  Known PAD from last angiogram 3/2023  Continue asa, plavix, statin         VTE Risk Mitigation (From admission, onward)           Ordered     heparin 25,000 units in dextrose 5% 250 mL (100 units/mL) infusion LOW INTENSITY nomogram - [facility]  Continuous        Question:  Begin at (units/kg/hr)  Answer:  12    01/13/24 0819     IP VTE LOW RISK PATIENT  Once         01/11/24 1648     heparin 25,000 units in dextrose 5% (100 units/ml) IV bolus from bag - ADDITIONAL PRN BOLUS - 60 units/kg (max bolus 4000 units)  As needed (PRN)        Question:  Heparin Infusion Adjustment (DO NOT MODIFY ANSWER)  Answer:  \\[facility]\epic\Images\Pharmacy\HeparinInfusions\heparin LOW INTENSITY nomogram for [facility] [id number]    01/11/24 0823     heparin 25,000 units in dextrose 5% (100 units/ml) IV bolus from bag - ADDITIONAL PRN BOLUS - 30 units/kg (max bolus 4000 units)  As needed (PRN)        Question:  Heparin Infusion Adjustment (DO NOT MODIFY ANSWER)  Answer:  \[employer]\epic\Images\Pharmacy\HeparinInfusions\heparin LOW INTENSITY nomogram for [facility] [id number]    01/11/24 0823     Place sequential compression device  Until discontinued         01/11/24 0225                    Discharge Planning   NISA: 1/16/2024     Code Status: Full Code   Is the patient medically ready for discharge?:     Reason for patient still in  hospital (select all that apply): Laboratory test, Treatment, and Consult recommendations  Discharge Plan A: Home Health                Nikita Castillo III, MD  Department of Hospital Medicine   [facility] - Intensive Care

## 2024-01-16 NOTE — ASSESSMENT & PLAN NOTE
RML infiltrate  Trach aspirate with suspect Pseudomonas  - continue zosyn. ID consulted. Vancomycin stopped

## 2024-01-16 NOTE — ASSESSMENT & PLAN NOTE
Patient admitted with Hypoxic which is Acute on Chronic.  he is on home oxygen at 6 LPM trach collar. Signs/symptoms of respiratory failure include- increased work of breathing and respiratory distress present on admission.   - Labs and images were reviewed. Patient Has not has a recent ABG.   - Supplemental oxygen was provided and noted-  trach collar    - Respiratory failure is due to- CHF and Pneumonia   - CT with RML PNA. Trach aspirate suspected Pseudomonas. Continue vanc, zosyn for now- likely can drop vanc tomorrow   - BNP higher than ever before. TTE normal EF. NSTEMI present. Continue lasix 40mg IV daily- can likely change to PO tomorrow  - CTA no PE  - improving from admit  Consulted ID for Abx management. Continuing with Zosyn  Still on FIO2 at 60%,continue with HFO and frequent suctioning,pulmonology is on case,

## 2024-01-16 NOTE — NURSING
[facility] Intensive Care  ICU Shift Summary  Date: 1/16/2024      Prehospitalization: Home  Admit Date / LOS : 1/11/2024/ 5 days    Diagnosis: Acute on chronic respiratory failure with hypoxia    Consults:        Active: Cardio, Gen Surg, and Pulm CC       Needed: N/A     Code Status: Full Code   Advanced Directive: Not Received    LDA:  Lines/Drains/Airways       Drain  Duration                  Gastrostomy/Enterostomy 01/04/22 Percutaneous endoscopic gastrostomy (PEG)  days    Male External Urinary Catheter 01/13/24 1800 2 days              Airway  Duration             Adult Surgical Airway 03/16/23 1014 Shiley Cuffed 6.0 305 days              Peripheral Intravenous Line  Duration                  Peripheral IV - Single Lumen 01/11/24 0013 20 G Right Antecubital 5 days         Peripheral IV - Single Lumen 01/15/24 1810 Anterior;Left;Proximal Forearm <1 day                  Central Lines/Site/Justification:Patient Does Not Have Central Line  Urinary Cath/Order/Justification:Patient Does Not Have Urinary Catheter    Vasopressors/Infusions:        GOALS: Volume/ Hemodynamic: N/A                     RASS: 0  alert and calm    Pain Management: none       Pain Controlled: not applicable     Rhythm: NSR    Respiratory Device: HFNC    Oxygen Concentration (%):  [40-70] 40                 Most Recent SBT/ SAT: Did not perform       MOVE Screen: PASS  ICU Liberation: yes    VTE Prophylaxis: Mechanical  Mobility: Bedrest  Stress Ulcer Prophylaxis: Yes    Isolation: No active isolations    Dietary:   Current Diet Order   Procedures    Diet NPO Except for: Sips with Medication     Order Specific Question:   Except for     Answer:   Sips with Medication      Tolerance: no  Advancement: no    I & O (24h):    Intake/Output Summary (Last 24 hours) at 1/16/2024 0611  Last data filed at 1/16/2024 0608  Gross per 24 hour   Intake 1653.03 ml   Output 1150 ml   Net 503.03 ml        Restraints: No    Significant Dates:  Post Op  "Date: N/A  Rescue Date: N/A  Imaging/ Diagnostics: N/A    Noteworthy Labs:  none    COVID Test: (--)  CBC/Anemia Labs: Coags:    Recent Labs   Lab 01/15/24  0359 01/16/24  0445   WBC 7.20 6.50   HGB 10.1* 9.9*   HCT 34.3* 32.4*    201   MCV 97 95   RDW 14.7* 14.7*    Recent Labs   Lab 01/11/24  0855 01/11/24  1557 01/15/24  0359 01/16/24  0445   INR 1.1  --   --   --    APTT 26.3   < > 27.5 26.6    < > = values in this interval not displayed.        Chemistries:   Recent Labs   Lab 01/12/24  0635 01/13/24  0241 01/15/24  0359 01/16/24  0445   *   < > 139 140   K 3.6   < > 3.4* 4.0   CL 94*   < > 96 97   CO2 31*   < > 34* 33*   BUN 25*   < > 23 20   CREATININE 0.9   < > 0.9 0.8   CALCIUM 8.9   < > 9.5 9.8   PROT 6.6   < > 6.8 6.7   BILITOT 0.5   < > 0.7 0.5   ALKPHOS 76   < > 78 78   ALT 29   < > 21 19   AST 55*   < > 21 18   MG 2.1   < > 2.1 1.9   PHOS 3.0  --   --   --     < > = values in this interval not displayed.        Cardiac Enzymes: Ejection Fractions:    No results for input(s): "CPK", "CPKMB", "MB", "TROPONINI" in the last 72 hours. EF   Date Value Ref Range Status   03/20/2023 60 % Final        POCT Glucose: HbA1c:    Recent Labs   Lab 01/15/24  1658 01/16/24  0047 01/16/24  0554   POCTGLUCOSE 134* 138* 98    Hemoglobin A1C   Date Value Ref Range Status   08/29/2023 5.8 (H) 4.0 - 5.6 % Final     Comment:     ADA Screening Guidelines:  5.7-6.4%  Consistent with prediabetes  >or=6.5%  Consistent with diabetes    High levels of fetal hemoglobin interfere with the HbA1C  assay. Heterozygous hemoglobin variants (HbS, HgC, etc)do  not significantly interfere with this assay.   However, presence of multiple variants may affect accuracy.             ICU LOS 5d 3h  Level of Care: Critical Care    Chart Check: 24 HR Done  Shift Summary/Plan for the shift: Patient requires frequent suctioning. Secretions thick and creamy.No acute distress noted, safety maintained. Resting in bed peacefully, side rails " up x3 with call light within reach.

## 2024-01-16 NOTE — ASSESSMENT & PLAN NOTE
Known PAD from last angiogram 3/2023  Continue asa, plavix, statin      

## 2024-01-16 NOTE — ASSESSMENT & PLAN NOTE
Chronic, controlled. Latest blood pressure and vitals reviewed-     Temp:  [97.7 °F (36.5 °C)-98.4 °F (36.9 °C)]   Pulse:  []   Resp:  [18-38]   BP: ()/(57-83)   SpO2:  [90 %-100 %] .   Home meds for hypertension were reviewed and noted below.   Hypertension Medications               furosemide (LASIX) 40 MG tablet Take 0.5 tablets (20 mg total) by mouth once daily.    metoprolol tartrate (LOPRESSOR) 25 MG tablet Take 0.5 tablets (12.5 mg total) by mouth 2 (two) times daily.          - hold BP Rx because BP is borderline low     Will utilize p.r.n. blood pressure medication only if patient's blood pressure greater than 180/110 and he develops symptoms such as worsening chest pain or shortness of breath.

## 2024-01-16 NOTE — NURSING
[facility]  Nurses Note -- 4 Eyes      1/15/2024       Skin assessed on: Q Shift      [x] No Pressure Injuries Present    [x]Prevention Measures Documented    [] Yes LDA  for Pressure Injury Previously documented     [] Yes New Pressure Injury Discovered   [] LDA for New Pressure Injury Added      Attending RN:  Ivet Blackburn RN     Second RN:  America BOYCE RN

## 2024-01-16 NOTE — PROGRESS NOTES
[facility] [place] [facility]  Cardiology  Progress Note    Patient Name: Fareed Richard Jr.  MRN: [id number]  Admission Date: 1/11/2024  Hospital Length of Stay: 5 days  Code Status: Full Code   Attending Physician: Nikita Castillo III, MD   Primary Care Physician: Kodi Tubbs MD  Expected Discharge Date: 1/16/2024  Principal Problem:Acute on chronic respiratory failure with hypoxia    Subjective:       Interval History:  Continues to be chest pain-free.  No new cardiovascular complaints.        Past Medical History:   Diagnosis Date    Cancer     skin to Rt forearm and face    COPD (chronic obstructive pulmonary disease)     Hyperlipidemia     Hypertension     Pseudoaneurysm        Past Surgical History:   Procedure Laterality Date    ABDOMINAL SURGERY      stents placed in liver and large intestines, per patient    ANGIOGRAPHY OF AORTIC ARCH  3/27/2023    Procedure: Aortogram, Aortic Arch;  Surgeon: Tim Bhatt MD;  Location: [facility] CATH LAB;  Service: Cardiology;;    AORTOGRAPHY WITH SERIALOGRAPHY N/A 3/27/2023    Procedure: AORTOGRAM, WITH SERIALOGRAPHY;  Surgeon: Tim Bhatt MD;  Location: [facility] CATH LAB;  Service: Cardiology;  Laterality: N/A;    CAROTID STENT      COLONOSCOPY N/A 09/27/2022    Procedure: COLONOSCOPY;  Surgeon: Donnie Peterson MD;  Location[facility] ([facility]  Service: Endoscopy;  Laterality: N/A;    COLONOSCOPY N/A 09/30/2022    Procedure: COLONOSCOPY;  Surgeon: Joy Cabrera MD;  Location: [facility]O ([facility]  Service: Endoscopy;  Laterality: N/A;    CORONARY STENT PLACEMENT  01/2000    DISSECTION OF NECK Bilateral 01/04/2022    Procedure: DISSECTION, NECK;  Surgeon: Naeem Smalls MD;  [facility];  Service: ENT;  Laterality: Bilateral;    ESOPHAGOGASTRODUODENOSCOPY N/A 09/27/2022    Procedure: EGD (ESOPHAGOGASTRODUODENOSCOPY);  Surgeon: Donnie Peterson MD;  Location: [facility]  Service: Endoscopy;  Laterality: N/A;    ESOPHAGOGASTRODUODENOSCOPY  N/A 09/30/2022    Procedure: EGD (ESOPHAGOGASTRODUODENOSCOPY);  Surgeon: Joy Cabrera MD;  Location: [facility] ENDO (2ND FLR);  Service: Endoscopy;  Laterality: N/A;    ESOPHAGOGASTRODUODENOSCOPY W/ PEG N/A 01/04/2022    Procedure: EGD, WITH PEG TUBE INSERTION;  Surgeon: Anthony Calabrese MD;  Location: [facility] OR 2ND FLR;  Service: General;  Laterality: N/A;    EYE SURGERY      Cataract bilateral    femoral stents      bilateral    FLAP PROCEDURE N/A 01/03/2022    Procedure: CREATION, FREE FLAP;  Surgeon: Naeem Smalls MD;  Location: [facility] OR 2ND FLR;  Service: ENT;  Laterality: N/A;    FLAP PROCEDURE Right 01/04/2022    Procedure: CREATION, FREE FLAP;  Surgeon: Stacey Conde MD;  Location: [facility] OR 2ND FLR;  Service: ENT;  Laterality: Right;  Ischemic start 1203  Ischemic stop 1353    GLOSSECTOMY N/A 01/04/2022    Procedure: GLOSSECTOMY;  Surgeon: Naeem Smalls MD;  Location: [facility] OR 2ND FLR;  Service: ENT;  Laterality: N/A;    LEFT HEART CATHETERIZATION Left 3/23/2023    Procedure: Left heart cath;  Surgeon: Tacos Benitez MD;  Location: [facility] CATH LAB;  Service: Cardiology;  Laterality: Left;    PERCUTANEOUS TRANSLUMINAL BALLOON ANGIOPLASTY OF CORONARY ARTERY Left 3/27/2023    Procedure: Angioplasty-coronary;  Surgeon: Tim Bhatt MD;  Location: [facility] CATH LAB;  Service: Cardiology;  Laterality: Left;  not before 9am, R rad access, 6 Fr EBU 3.5 guide    RADICAL NECK DISSECTION Left 01/03/2022    Procedure: DISSECTION, NECK, RADICAL;  Surgeon: Naeem Smalls MD;  [facility];  Service: ENT;  Laterality: Left;    SKIN BIOPSY      SKIN CANCER EXCISION      STENT, CORONARY, MULTI VESSEL  3/27/2023    Procedure: Stent, Coronary, Multi Vessel;  Surgeon: Tim Bhatt MD;  Location: [facility] CATH LAB;  Service: Cardiology;;    TRACHEOTOMY N/A 01/04/2022    Procedure: TRACHEOTOMY;  Surgeon: Naeem Smalls MD;  Location: [facility] OR 2ND FLR;  Service: ENT;  Laterality: N/A;     VASCULAR SURGERY         Review of patient's allergies indicates:   Allergen Reactions    Ativan [lorazepam] Anxiety       No current facility-administered medications on file prior to encounter.     Current Outpatient Medications on File Prior to Encounter   Medication Sig    albuterol-ipratropium (DUO-NEB) 2.5 mg-0.5 mg/3 mL nebulizer solution Take 3 mLs by nebulization every 4 (four) hours as needed for Wheezing. Rescue    ascorbic acid, vitamin C, (VITAMIN C) 100 MG tablet Take 100 mg by mouth once daily.    aspirin 81 MG Chew Take 1 tablet (81 mg total) by mouth once daily.    atorvastatin (LIPITOR) 40 MG tablet 1 tablet (40 mg total) by Per G Tube route every evening. (Patient taking differently: 40 mg by Per G Tube route once daily.)    bisacodyL (DULCOLAX) 10 mg Supp Place 1 suppository (10 mg total) rectally daily as needed.    clopidogreL (PLAVIX) 75 mg tablet Take 1 tablet (75 mg total) by mouth once daily. (Patient taking differently: Take 75 mg by mouth nightly.)    ferrous sulfate 325 (65 FE) MG EC tablet Take 325 mg by mouth 3 (three) times daily with meals.    FLUoxetine (PROZAC) 20 mg/5 mL (4 mg/mL) solution Take 20 mg by mouth once daily.    folic acid (FOLVITE) 1 MG tablet Take 1 mg by mouth once daily.    furosemide (LASIX) 40 MG tablet Take 0.5 tablets (20 mg total) by mouth once daily. (Patient taking differently: Take 20 mg by mouth nightly.)    glycopyrrolate (CUVPOSA) 1 mg/5 mL (0.2 mg/mL) Soln Take 5 mLs (1 mg total) by mouth 3 (three) times daily as needed (TO REDUCE SECRETIONS).    hydrOXYzine HCL (ATARAX) 25 MG tablet SMARTSI.5 Tablet(s) Gastro Tube Every 8 Hours PRN    melatonin (MELATIN) 3 mg tablet 1 tablet (3 mg total) by Per G Tube route nightly.    metoprolol tartrate (LOPRESSOR) 25 MG tablet Take 0.5 tablets (12.5 mg total) by mouth 2 (two) times daily.    omeprazole (PRILOSEC) 40 MG capsule Take 40 mg (contents of one capsule) twice daily through PEG tube. Mix contents of  capsule with 10 mL applesauce. (Patient taking differently: 40 mg 2 (two) times a day. Take 40 mg (contents of one capsule) twice daily through PEG tube. Mix contents of capsule with 10 mL applesauce.)    polyethylene glycol (GLYCOLAX) 17 gram PwPk 17 g by Per G Tube route 2 (two) times daily.    sodium chloride 3% 3 % nebulizer solution Take 4 mLs by nebulization 2 (two) times a day.    thiamine (VITAMIN B-1) 50 MG tablet Take 50 mg by mouth once daily.    WIXELA INHUB 250-50 mcg/dose diskus inhaler SMARTSI Puff(s) By Mouth Every 12 Hours    [DISCONTINUED] losartan (COZAAR) 50 MG tablet 1 tablet (50 mg total) by Per G Tube route once daily.     Family History    None       Tobacco Use    Smoking status: Former     Current packs/day: 0.00     Average packs/day: 2.0 packs/day for 55.0 years (110.0 ttl pk-yrs)     Types: Cigarettes     Start date: 1963     Quit date: 2018     Years since quittin.7    Smokeless tobacco: Never    Tobacco comments:     3/3 ppd x 40 yrs. Currently 3-4 cigarettes daily .He is trying  to quit. Is using a Vapor cigarettes  2-3 x's a day.   Substance and Sexual Activity    Alcohol use: Not Currently    Drug use: No    Sexual activity: Not Currently     Review of Systems   Constitutional:  Positive for fatigue and fever.   HENT:  Negative for congestion.    Eyes:  Negative for discharge.   Respiratory:  Positive for cough and shortness of breath. Negative for chest tightness.    Cardiovascular:  Negative for chest pain and leg swelling.   Gastrointestinal:  Negative for abdominal pain.   Endocrine: Negative for polyphagia.   Genitourinary:  Negative for difficulty urinating.   Musculoskeletal:  Positive for arthralgias.   Skin:  Negative for color change.   Allergic/Immunologic: Negative for food allergies.   Neurological:  Negative for headaches.   Psychiatric/Behavioral:  Negative for confusion.      Objective:     Vital Signs (Most Recent):  Temp: 97.8 °F (36.6 °C)  (01/16/24 1437)  Pulse: 90 (01/16/24 1400)  Resp: (!) 26 (01/16/24 1400)  BP: 117/71 (01/16/24 1400)  SpO2: 97 % (01/16/24 1400) Vital Signs (24h Range):  Temp:  [97.7 °F (36.5 °C)-97.8 °F (36.6 °C)] 97.8 °F (36.6 °C)  Pulse:  [] 90  Resp:  [18-38] 26  SpO2:  [90 %-100 %] 97 %  BP: ()/(58-83) 117/71     Weight: 58.6 kg (129 lb 3 oz)  Body mass index is 19.08 kg/m².     Physical Exam  Vitals and nursing note reviewed.   Constitutional:       General: He is not in acute distress.     Appearance: He is ill-appearing (chronically). He is not toxic-appearing.   HENT:      Head: Normocephalic and atraumatic.      Mouth/Throat:      Mouth: Mucous membranes are moist.   Cardiovascular:      Rate and Rhythm: Normal rate and regular rhythm.   Pulmonary:      Effort: Pulmonary effort is normal.      Breath sounds: Normal breath sounds. No wheezing, rhonchi or rales.      Comments: 10L trach collar  Abdominal:      General: Bowel sounds are normal. There is no distension.      Palpations: Abdomen is soft. There is no mass.      Tenderness: There is no abdominal tenderness. There is no guarding.      Comments: PEG in place   Musculoskeletal:      Right lower leg: No edema.      Left lower leg: No edema.   Skin:     General: Skin is warm and dry.   Neurological:      Mental Status: He is alert. Mental status is at baseline.                  DATA:     Laboratory:  CBC:  Recent Labs   Lab 01/14/24  0449 01/15/24  0359 01/16/24  0445   WBC 8.41 7.20 6.50   Hemoglobin 10.1 L 10.1 L 9.9 L   Hematocrit 33.8 L 34.3 L 32.4 L   Platelets 200 184 201         CHEMISTRIES:  Recent Labs   Lab 05/12/22  0343 05/14/22  0724 05/17/22  1007 08/24/22  1000 01/14/24  0449 01/15/24  0359 01/16/24  0445   Glucose 110 96 162 H   < > 87 91 94   Sodium 135 L 136 133 L   < > 140 139 140   Potassium 4.4 4.1 5.0   < > 3.5 3.4 L 4.0   BUN 24 H 18 15   < > 20 23 20   Creatinine 0.7 0.7 0.7   < > 0.8 0.9 0.8   eGFR if  >60.0 >60  >60.0  --   --   --   --    eGFR if non African American >60.0 >60 >60.0  --   --   --   --    Calcium 9.1 9.6 9.5   < > 9.6 9.5 9.8   Magnesium 2.0  --  2.0   < > 2.0 2.1 1.9    < > = values in this interval not displayed.         CARDIAC BIOMARKERS:  Recent Labs   Lab 01/11/24  0021 01/11/24  0311 01/11/24  0855   Troponin I 0.393 H 1.011 H 3.344 H         COAGS:  Recent Labs   Lab 04/02/23  0322 11/26/23  0505 01/11/24  0855   INR 1.0 1.1 1.1         LIPIDS/LFTS:  Recent Labs   Lab 08/29/23  1522 10/20/23  0815 01/12/24  0635 01/13/24  0241 01/14/24  0449 01/15/24  0359 01/16/24  0445   Cholesterol 85 L  --  95 L  --   --   --   --    Triglycerides 68  --  60  --   --   --   --    HDL 25 L  --  37 L  --   --   --   --    LDL Cholesterol 46.4 L  --  46.0 L  --   --   --   --    Non-HDL Cholesterol 60  --  58  --   --   --   --    AST 20   < > 55 H   < > 22 21 18   ALT 28   < > 29   < > 25 21 19    < > = values in this interval not displayed.         Hemoglobin A1C   Date Value Ref Range Status   08/29/2023 5.8 (H) 4.0 - 5.6 % Final     Comment:     ADA Screening Guidelines:  5.7-6.4%  Consistent with prediabetes  >or=6.5%  Consistent with diabetes    High levels of fetal hemoglobin interfere with the HbA1C  assay. Heterozygous hemoglobin variants (HbS, HgC, etc)do  not significantly interfere with this assay.   However, presence of multiple variants may affect accuracy.     09/27/2022 6.3 (H) 4.0 - 5.6 % Final     Comment:     ADA Screening Guidelines:  5.7-6.4%  Consistent with prediabetes  >or=6.5%  Consistent with diabetes    High levels of fetal hemoglobin interfere with the HbA1C  assay. Heterozygous hemoglobin variants (HbS, HgC, etc)do  not significantly interfere with this assay.   However, presence of multiple variants may affect accuracy.         TSH  Recent Labs   Lab 05/23/23  0849 07/10/23  1040 08/29/23  1522   TSH 4.379 H 3.610 4.460 H         The ASCVD Risk score (Caitlyn DK, et al., 2019) failed to  calculate for the following reasons:    The patient has a prior MI or stroke diagnosis       BNP    Lab Results   Component Value Date/Time    BNP 2,467 (H) 01/11/2024 12:21 AM    BNP 1,708 (H) 11/20/2023 04:06 PM     (H) 10/20/2023 08:15 AM    BNP 1,185 (H) 04/18/2023 07:07 AM    BNP 2,543 (H) 03/21/2023 03:25 PM     (H) 03/18/2023 11:06 AM     (H) 09/23/2022 12:47 PM     (H) 09/14/2022 04:59 AM    BNP 98 05/14/2022 07:25 AM     (H) 04/22/2022 05:04 PM    BNP 1,607 (H) 04/19/2022 09:29 AM     (H) 03/21/2022 02:39 PM     (H) 11/02/2019 02:52 AM     (H) 12/27/2018 01:29 PM            ECHO    Results for orders placed during the hospital encounter of 01/11/24    Echo    Interpretation Summary    Left Ventricle: The left ventricle is normal in size. Mildly increased wall thickness. There is concentric hypertrophy. Normal wall motion. There is low normal systolic function with a visually estimated ejection fraction of 50 - 55%. Unable to assess diastolic function due to E-A fusion.    Right Ventricle: Normal right ventricular cavity size. Systolic function is normal.    Left Atrium: Left atrium is moderately dilated.    Right Atrium: Right atrium is mildly dilated.    Mitral Valve: There is mild regurgitation.    Tricuspid Valve: There is mild regurgitation.    Pulmonary Artery: The estimated pulmonary artery systolic pressure is 13 mmHg.    IVC/SVC: Normal venous pressure at 3 mmHg.      STRESS TEST    No results found for this or any previous visit.        CATH    Results for orders placed during the hospital encounter of 03/18/23    Cardiac catheterization    Conclusion  Description of the findings of the procedure: uneventful LHC/cor angio/Ao angio/iliac angio/PCI prox LAD BRADLEY x1, PCI mid LCx BRADLEY x1/R rad vasband/RFA man comp.    Findings/Key Components:  LVEDP: 3mmHg  LVEF: 60%    Aortic arch: severe calcific atherosclerosis  Ost innominate artery 80%  Prox R  SCA 90%, 53mmHg gradient across stenoses.  Prox-mid ICA patent  Distal aortoiliac bifurcation with patent stents in ЮЛИЯ bilaterally and possible severe ISR in R ЮЛИЯ.    Severe diffuse cor Ca++  Dominance: Right  LM: MLI  LAD: prox 95% at D1, mid  Ramus: MLI  LCx: mid 90%  RCA: not injected, diffuse disease, severe dist RCA stenosis (see Dr. Benitez cath report    PCI prox LAD:  Preop ASA/Plavix, intraop heparin  Wired LAD/D1  Predil 2.5x12, 2.75x15 NC  Stent 3.0x24 Synergy BRADLEY  Post IVUS with mild underexpansion mid stent  Postdil 3.25x20 NC at 22atm  Excellent angiographic result, T3 flow, 0% residual stenosis    PCI mid LCx  Predil 2.5x12  Stent 2.75x20 Synergy BRADLEY 14 fidelia  Unavble to pass IVUS due to severe prox LCx tortuosity  Excellent angiographic result, T3 flow, 0% residual stenosis    Hemostasis:  R Radial band  RFA man comp  Assessment and Plan:     Brief HPI:     * Acute on chronic respiratory failure with hypoxia        Pneumonia        NSTEMI (non-ST elevated myocardial infarction)  TYPE 1 VERSUS TYPE 2.  PATIENT IS CHEST PAIN-FREE.  STATES THAT PRIOR TO HIS PCI WHEN HE HAD THE NON-STEMI LAST TIME, HE DID HAVE CHEST PAINS.  EKG DOES SHOW ST DEPRESSIONS.  CASE DISCUSSED IN DETAIL WITH THE DR. TAMAYO WHO IS THE PATIENT'S PRIMARY CARDIOLOGIST.  LAST PCI WAS COMPLICATED BY PULMONARY AND RETROPERITONEAL HEMORRHAGE.  WHEN THE PATIENT SAW DR. TAMAYO IN CLINIC AS AN OUTPATIENT, DR. TAMAYO HAD RECOMMENDED PALLIATIVE CARE CONSULT.  TODAY WITH ME AND DR. TAMAYO HAD A DETAILED DISCUSSION AND CONSIDERING HIS POOR STATE OF HEALTH, MULTIPLE COMORBIDITIES, THE FACT THAT HE IS CHEST PAIN-FREE AND THE FACT THAT HE HAD MULTIPLE COMPLICATIONS CHRONIC PULMONARY HEMORRHAGE AS WELL AS RETROPERITONEAL HEMORRHAGE AFTER HIS LAST CATHETERIZATION AND PCI BY DR. WEST, WE RECOMMEND MEDICAL MANAGEMENT AND PALLIATIVE CARE CONSULT AT THE CURRENT TIME.  HE IS CURRENTLY COMFORTABLE AND CHEST PAIN-FREE.  CONTINUE MEDICAL MANAGEMENT.   IF DEVELOPS LIFESTYLE LIMITING ANGINA, THEN DISCUSSION OF HIGH-RISK PCI WILL BE HELD.     Continues to be chest pain-free.  Continue medical management.    PEG (percutaneous endoscopic gastrostomy) status        Tracheostomy present        Severe protein-calorie malnutrition          Other hyperlipidemia        Coronary artery disease involving native coronary artery of native heart without angina pectoris  ALTHOUGH PATIENT'S TROPONINS ELEVATED, HE IS CHEST PAIN-FREE.  STATES THAT PRIOR TO HIS STENTS IN MARCH HE HAD CHEST PAINS AND CURRENTLY IS CHEST PAIN-FREE.  IT IS POSSIBLE THAT HIS TROPONIN COULD BE ELEVATED SECONDARY TO HIS HYPOXEMIA ON PRESENTATION.  HOWEVER DOES HAVE LATERAL ST DEPRESSIONS AND ISCHEMIC LOOKING EKG.        Peripheral vascular disease              VTE Risk Mitigation (From admission, onward)           Ordered     heparin 25,000 units in dextrose 5% 250 mL (100 units/mL) infusion LOW INTENSITY nomogram - [facility]  Continuous        Question:  Begin at (units/kg/hr)  Answer:  12    01/13/24 0819     IP VTE LOW RISK PATIENT  Once         01/11/24 1648     heparin 25,000 units in dextrose 5% (100 units/ml) IV bolus from bag - ADDITIONAL PRN BOLUS - 60 units/kg (max bolus 4000 units)  As needed (PRN)        Question:  Heparin Infusion Adjustment (DO NOT MODIFY ANSWER)  Answer:  \\[email or web address]\epic\Images\Pharmacy\HeparinInfusions\heparin LOW INTENSITY nomogram for [facility] [id number]    01/11/24 0823     heparin 25,000 units in dextrose 5% (100 units/ml) IV bolus from bag - ADDITIONAL PRN BOLUS - 30 units/kg (max bolus 4000 units)  As needed (PRN)        Question:  Heparin Infusion Adjustment (DO NOT MODIFY ANSWER)  Answer:  \\[email or web address]\epic\Images\Pharmacy\HeparinInfusions\heparin LOW INTENSITY nomogram for [facility] [id number].pdf    01/11/24 0823     Place sequential compression device  Until discontinued         01/11/24 0225                    Arturo Malone MD  Cardiology  [facility] - [place] - Intensive Care    Critical  Care Time:  35 minutes     Critical care was time spent personally by me on the following activities: development of treatment plan with patient or surrogate and bedside caregivers, discussions with consultants, evaluation of patient's response to treatment, examination of patient, ordering and performing treatments and interventions, ordering and review of laboratory studies, ordering and review of radiographic studies, pulse oximetry, re-evaluation of patient's condition. This critical care time did not overlap with that of any other provider or involve time for any procedures.

## 2024-01-16 NOTE — SUBJECTIVE & OBJECTIVE
Interval History:  Continues to be chest pain-free.        Past Medical History:   Diagnosis Date    Cancer     skin to Rt forearm and face    COPD (chronic obstructive pulmonary disease)     Hyperlipidemia     Hypertension     Pseudoaneurysm        Past Surgical History:   Procedure Laterality Date    ABDOMINAL SURGERY      stents placed in liver and large intestines, per patient    ANGIOGRAPHY OF AORTIC ARCH  3/27/2023    Procedure: Aortogram, Aortic Arch;  Surgeon: Tim Bhatt MD;  Location: [facility] CATH LAB;  Service: Cardiology;;    AORTOGRAPHY WITH SERIALOGRAPHY N/A 3/27/2023    Procedure: AORTOGRAM, WITH SERIALOGRAPHY;  Surgeon: Tim Bhatt MD;  Location: [facility] CATH LAB;  Service: Cardiology;  Laterality: N/A;    CAROTID STENT      COLONOSCOPY N/A 09/27/2022    Procedure: COLONOSCOPY;  Surgeon: Donnie Petreson MD;  Location: [facility] ENDO (2ND FLR);  Service: Endoscopy;  Laterality: N/A;    COLONOSCOPY N/A 09/30/2022    Procedure: COLONOSCOPY;  Surgeon: Joy Cabrera MD;  Location: [facility] ENDO (2ND FLR);  Service: Endoscopy;  Laterality: N/A;    CORONARY STENT PLACEMENT  01/2000    DISSECTION OF NECK Bilateral 01/04/2022    Procedure: DISSECTION, NECK;  Surgeon: Naeem Smalls MD;  Location: [facility] OR 2ND FLR;  Service: ENT;  Laterality: Bilateral;    ESOPHAGOGASTRODUODENOSCOPY N/A 09/27/2022    Procedure: EGD (ESOPHAGOGASTRODUODENOSCOPY);  Surgeon: Donnie Peterson MD;  Location: [facility] ENDO (2ND FLR);  Service: Endoscopy;  Laterality: N/A;    ESOPHAGOGASTRODUODENOSCOPY N/A 09/30/2022    Procedure: EGD (ESOPHAGOGASTRODUODENOSCOPY);  Surgeon: Joy Cabrera MD;  Location: [facility] ENDO (2ND FLR);  Service: Endoscopy;  Laterality: N/A;    ESOPHAGOGASTRODUODENOSCOPY W/ PEG N/A 01/04/2022    Procedure: EGD, WITH PEG TUBE INSERTION;  Surgeon: Anthony Calabrese MD;  Location: [facility] OR 2ND FLR;  Service: General;  Laterality: N/A;    EYE SURGERY      Cataract bilateral    femoral stents      bilateral    FLAP  PROCEDURE N/A 01/03/2022    Procedure: CREATION, FREE FLAP;  Surgeon: Naeem Smalls MD;  Location: [facility] OR [facility] FLR;  Service: ENT;  Laterality: N/A;    FLAP PROCEDURE Right 01/04/2022    Procedure: CREATION, FREE FLAP;  Surgeon: Stacey Conde MD;  [facility];  Service: ENT;  Laterality: Right;  Ischemic start 1203  Ischemic stop 1353    GLOSSECTOMY N/A 01/04/2022    Procedure: GLOSSECTOMY;  Surgeon: Naeem Smalls MD;  Location: [facility] OR [facility] FLR;  Service: ENT;  Laterality: N/A;    LEFT HEART CATHETERIZATION Left 3/23/2023    Procedure: Left heart cath;  Surgeon: Tacos Benitez MD;  Location: [facility] CATH LAB;  Service: Cardiology;  Laterality: Left;    PERCUTANEOUS TRANSLUMINAL BALLOON ANGIOPLASTY OF CORONARY ARTERY Left 3/27/2023    Procedure: Angioplasty-coronary;  Surgeon: Tim Bhatt MD;  Location: [facility] CATH LAB;  Service: Cardiology;  Laterality: Left;  not before 9am, R rad access, 6 Fr EBU 3.5 guide    RADICAL NECK DISSECTION Left 01/03/2022    Procedure: DISSECTION, NECK, RADICAL;  Surgeon: Naeem Smalls MD;  [facility];  Service: ENT;  Laterality: Left;    SKIN BIOPSY      SKIN CANCER EXCISION      STENT, CORONARY, MULTI VESSEL  3/27/2023    Procedure: Stent, Coronary, Multi Vessel;  Surgeon: Tim Bhatt MD;  Location: [facility] CATH LAB;  Service: Cardiology;;    TRACHEOTOMY N/A 01/04/2022    Procedure: TRACHEOTOMY;  Surgeon: Naeem Smalls MD;  [facility];  Service: ENT;  Laterality: N/A;    VASCULAR SURGERY         Review of patient's allergies indicates:   Allergen Reactions    Ativan [lorazepam] Anxiety       No current facility-administered medications on file prior to encounter.     Current Outpatient Medications on File Prior to Encounter   Medication Sig    albuterol-ipratropium (DUO-NEB) 2.5 mg-0.5 mg/3 mL nebulizer solution Take 3 mLs by nebulization every 4 (four) hours as needed for Wheezing. Rescue    ascorbic  acid, vitamin C, (VITAMIN C) 100 MG tablet Take 100 mg by mouth once daily.    aspirin 81 MG Chew Take 1 tablet (81 mg total) by mouth once daily.    atorvastatin (LIPITOR) 40 MG tablet 1 tablet (40 mg total) by Per G Tube route every evening. (Patient taking differently: 40 mg by Per G Tube route once daily.)    bisacodyL (DULCOLAX) 10 mg Supp Place 1 suppository (10 mg total) rectally daily as needed.    clopidogreL (PLAVIX) 75 mg tablet Take 1 tablet (75 mg total) by mouth once daily. (Patient taking differently: Take 75 mg by mouth nightly.)    ferrous sulfate 325 (65 FE) MG EC tablet Take 325 mg by mouth 3 (three) times daily with meals.    FLUoxetine (PROZAC) 20 mg/5 mL (4 mg/mL) solution Take 20 mg by mouth once daily.    folic acid (FOLVITE) 1 MG tablet Take 1 mg by mouth once daily.    furosemide (LASIX) 40 MG tablet Take 0.5 tablets (20 mg total) by mouth once daily. (Patient taking differently: Take 20 mg by mouth nightly.)    glycopyrrolate (CUVPOSA) 1 mg/5 mL (0.2 mg/mL) Soln Take 5 mLs (1 mg total) by mouth 3 (three) times daily as needed (TO REDUCE SECRETIONS).    hydrOXYzine HCL (ATARAX) 25 MG tablet SMARTSI.5 Tablet(s) Gastro Tube Every 8 Hours PRN    melatonin (MELATIN) 3 mg tablet 1 tablet (3 mg total) by Per G Tube route nightly.    metoprolol tartrate (LOPRESSOR) 25 MG tablet Take 0.5 tablets (12.5 mg total) by mouth 2 (two) times daily.    omeprazole (PRILOSEC) 40 MG capsule Take 40 mg (contents of one capsule) twice daily through PEG tube. Mix contents of capsule with 10 mL applesauce. (Patient taking differently: 40 mg 2 (two) times a day. Take 40 mg (contents of one capsule) twice daily through PEG tube. Mix contents of capsule with 10 mL applesauce.)    polyethylene glycol (GLYCOLAX) 17 gram PwPk 17 g by Per G Tube route 2 (two) times daily.    sodium chloride 3% 3 % nebulizer solution Take 4 mLs by nebulization 2 (two) times a day.    thiamine (VITAMIN B-1) 50 MG tablet Take 50 mg by  mouth once daily.    WIXELA INHUB 250-50 mcg/dose diskus inhaler SMARTSI Puff(s) By Mouth Every 12 Hours    [DISCONTINUED] losartan (COZAAR) 50 MG tablet 1 tablet (50 mg total) by Per G Tube route once daily.     Family History    None       Tobacco Use    Smoking status: Former     Current packs/day: 0.00     Average packs/day: 2.0 packs/day for 55.0 years (110.0 ttl pk-yrs)     Types: Cigarettes     Start date: 1963     Quit date: 2018     Years since quittin.7    Smokeless tobacco: Never    Tobacco comments:     3/3 ppd x 40 yrs. Currently 3-4 cigarettes daily .He is trying  to quit. Is using a Vapor cigarettes  2-3 x's a day.   Substance and Sexual Activity    Alcohol use: Not Currently    Drug use: No    Sexual activity: Not Currently     Review of Systems   Constitutional:  Positive for fatigue and fever.   HENT:  Negative for congestion.    Eyes:  Negative for discharge.   Respiratory:  Positive for cough and shortness of breath. Negative for chest tightness.    Cardiovascular:  Negative for chest pain and leg swelling.   Gastrointestinal:  Negative for abdominal pain.   Endocrine: Negative for polyphagia.   Genitourinary:  Negative for difficulty urinating.   Musculoskeletal:  Positive for arthralgias.   Skin:  Negative for color change.   Allergic/Immunologic: Negative for food allergies.   Neurological:  Negative for headaches.   Psychiatric/Behavioral:  Negative for confusion.      Objective:     Vital Signs (Most Recent):  Temp: 98.4 °F (36.9 °C) (01/15/24 1501)  Pulse: 94 (01/15/24 2003)  Resp: (!) 24 (01/15/24 2003)  BP: 113/73 (01/15/24 1900)  SpO2: (!) 94 % (01/15/24 2003) Vital Signs (24h Range):  Temp:  [97.7 °F (36.5 °C)-98.4 °F (36.9 °C)] 98.4 °F (36.9 °C)  Pulse:  [] 94  Resp:  [17-47] 24  SpO2:  [92 %-100 %] 94 %  BP: ()/(57-80) 113/73     Weight: 59.9 kg (132 lb)  Body mass index is 19.49 kg/m².     Physical Exam  Vitals and nursing note reviewed.   Constitutional:        General: He is not in acute distress.     Appearance: He is ill-appearing (chronically). He is not toxic-appearing.   HENT:      Head: Normocephalic and atraumatic.      Mouth/Throat:      Mouth: Mucous membranes are moist.   Cardiovascular:      Rate and Rhythm: Normal rate and regular rhythm.   Pulmonary:      Effort: Pulmonary effort is normal.      Breath sounds: Normal breath sounds. No wheezing, rhonchi or rales.      Comments: 10L trach collar  Abdominal:      General: Bowel sounds are normal. There is no distension.      Palpations: Abdomen is soft. There is no mass.      Tenderness: There is no abdominal tenderness. There is no guarding.      Comments: PEG in place   Musculoskeletal:      Right lower leg: No edema.      Left lower leg: No edema.   Skin:     General: Skin is warm and dry.   Neurological:      Mental Status: He is alert. Mental status is at baseline.                  DATA:     Laboratory:  CBC:  Recent Labs   Lab 01/13/24  0241 01/14/24  0449 01/15/24  0359   WBC 9.56 8.41 7.20   Hemoglobin 9.5 L 10.1 L 10.1 L   Hematocrit 30.9 L 33.8 L 34.3 L   Platelets 172 200 184         CHEMISTRIES:  Recent Labs   Lab 05/12/22  0343 05/14/22  0724 05/17/22  1007 08/24/22  1000 01/13/24  0241 01/14/24  0449 01/15/24  0359   Glucose 110 96 162 H   < > 328 H 87 91   Sodium 135 L 136 133 L   < > 133 L 140 139   Potassium 4.4 4.1 5.0   < > 3.0 L 3.5 3.4 L   BUN 24 H 18 15   < > 22 20 23   Creatinine 0.7 0.7 0.7   < > 1.0 0.8 0.9   eGFR if  >60.0 >60 >60.0  --   --   --   --    eGFR if non African American >60.0 >60 >60.0  --   --   --   --    Calcium 9.1 9.6 9.5   < > 8.7 9.6 9.5   Magnesium 2.0  --  2.0   < > 1.7 2.0 2.1    < > = values in this interval not displayed.         CARDIAC BIOMARKERS:  Recent Labs   Lab 01/11/24  0021 01/11/24  0311 01/11/24  0855   Troponin I 0.393 H 1.011 H 3.344 H         COAGS:  Recent Labs   Lab 04/02/23  0322 11/26/23  0505 01/11/24  0855   INR 1.0 1.1  1.1         LIPIDS/LFTS:  Recent Labs   Lab 08/29/23  1522 10/20/23  0815 01/12/24  0635 01/13/24  0241 01/14/24  0449 01/15/24  0359   Cholesterol 85 L  --  95 L  --   --   --    Triglycerides 68  --  60  --   --   --    HDL 25 L  --  37 L  --   --   --    LDL Cholesterol 46.4 L  --  46.0 L  --   --   --    Non-HDL Cholesterol 60  --  58  --   --   --    AST 20   < > 55 H 38 22 21   ALT 28   < > 29 33 25 21    < > = values in this interval not displayed.         Hemoglobin A1C   Date Value Ref Range Status   08/29/2023 5.8 (H) 4.0 - 5.6 % Final     Comment:     ADA Screening Guidelines:  5.7-6.4%  Consistent with prediabetes  >or=6.5%  Consistent with diabetes    High levels of fetal hemoglobin interfere with the HbA1C  assay. Heterozygous hemoglobin variants (HbS, HgC, etc)do  not significantly interfere with this assay.   However, presence of multiple variants may affect accuracy.     09/27/2022 6.3 (H) 4.0 - 5.6 % Final     Comment:     ADA Screening Guidelines:  5.7-6.4%  Consistent with prediabetes  >or=6.5%  Consistent with diabetes    High levels of fetal hemoglobin interfere with the HbA1C  assay. Heterozygous hemoglobin variants (HbS, HgC, etc)do  not significantly interfere with this assay.   However, presence of multiple variants may affect accuracy.         TSH  Recent Labs   Lab 05/23/23  0849 07/10/23  1040 08/29/23  1522   TSH 4.379 H 3.610 4.460 H         The ASCVD Risk score (Caitlyn DUPONT, et al., 2019) failed to calculate for the following reasons:    The patient has a prior MI or stroke diagnosis       BNP    Lab Results   Component Value Date/Time    BNP 2,467 (H) 01/11/2024 12:21 AM    BNP 1,708 (H) 11/20/2023 04:06 PM     (H) 10/20/2023 08:15 AM    BNP 1,185 (H) 04/18/2023 07:07 AM    BNP 2,543 (H) 03/21/2023 03:25 PM     (H) 03/18/2023 11:06 AM     (H) 09/23/2022 12:47 PM     (H) 09/14/2022 04:59 AM    BNP 98 05/14/2022 07:25 AM     (H) 04/22/2022 05:04 PM     BNP 1,607 (H) 04/19/2022 09:29 AM     (H) 03/21/2022 02:39 PM     (H) 11/02/2019 02:52 AM     (H) 12/27/2018 01:29 PM            ECHO    Results for orders placed during the hospital encounter of 01/11/24    Echo    Interpretation Summary    Left Ventricle: The left ventricle is normal in size. Mildly increased wall thickness. There is concentric hypertrophy. Normal wall motion. There is low normal systolic function with a visually estimated ejection fraction of 50 - 55%. Unable to assess diastolic function due to E-A fusion.    Right Ventricle: Normal right ventricular cavity size. Systolic function is normal.    Left Atrium: Left atrium is moderately dilated.    Right Atrium: Right atrium is mildly dilated.    Mitral Valve: There is mild regurgitation.    Tricuspid Valve: There is mild regurgitation.    Pulmonary Artery: The estimated pulmonary artery systolic pressure is 13 mmHg.    IVC/SVC: Normal venous pressure at 3 mmHg.      STRESS TEST    No results found for this or any previous visit.        CATH    Results for orders placed during the hospital encounter of 03/18/23    Cardiac catheterization    Conclusion  Description of the findings of the procedure: uneventful LHC/cor angio/Ao angio/iliac angio/PCI prox LAD BRADLEY x1, PCI mid LCx BRADLEY x1/R rad vasband/RFA man comp.    Findings/Key Components:  LVEDP: 3mmHg  LVEF: 60%    Aortic arch: severe calcific atherosclerosis  Ost innominate artery 80%  Prox R SCA 90%, 53mmHg gradient across stenoses.  Prox-mid ICA patent  Distal aortoiliac bifurcation with patent stents in ЮЛИ[place] bilaterally and possible severe ISR in R ЮЛИЯ.    Severe diffuse cor Ca++  Dominance: Right  LM: MLI  LAD: prox 95% at D1, mid  Ramus: MLI  LCx: mid 90%  RCA: not injected, diffuse disease, severe dist RCA stenosis (see Dr. Benitez cath report    PCI prox LAD:  Preop ASA/Plavix, intraop heparin  Wired LAD/D1  Predil 2.5x12, 2.75x15 NC  Stent 3.0x24 Synergy BRADLEY  Post IVUS with  mild underexpansion mid stent  Postdil 3.25x20 NC at 22atm  Excellent angiographic result, T3 flow, 0% residual stenosis    PCI mid LCx  Predil 2.5x12  Stent 2.75x20 Synergy BRADLEY 14 fidelia  Unavble to pass IVUS due to severe prox LCx tortuosity  Excellent angiographic result, T3 flow, 0% residual stenosis    Hemostasis:  R Radial band  RFA man comp

## 2024-01-16 NOTE — SUBJECTIVE & OBJECTIVE
Interval History:  Continues to be chest pain-free.  No new cardiovascular complaints.        Past Medical History:   Diagnosis Date    Cancer     skin to Rt forearm and face    COPD (chronic obstructive pulmonary disease)     Hyperlipidemia     Hypertension     Pseudoaneurysm        Past Surgical History:   Procedure Laterality Date    ABDOMINAL SURGERY      stents placed in liver and large intestines, per patient    ANGIOGRAPHY OF AORTIC ARCH  3/27/2023    Procedure: Aortogram, Aortic Arch;  Surgeon: Tim Bhatt MD;  Location: [facility] CATH LAB;  Service: Cardiology;;    AORTOGRAPHY WITH SERIALOGRAPHY N/A 3/27/2023    Procedure: AORTOGRAM, WITH SERIALOGRAPHY;  Surgeon: Tim Bhatt MD;  Location: [facility] CATH LAB;  Service: Cardiology;  Laterality: N/A;    CAROTID STENT      COLONOSCOPY N/A 09/27/2022    Procedure: COLONOSCOPY;  Surgeon: Donnie Peterson MD;  Location: [facility] ENDO (2ND FLR);  Service: Endoscopy;  Laterality: N/A;    COLONOSCOPY N/A 09/30/2022    Procedure: COLONOSCOPY;  Surgeon: Joy Cabrera MD;  Location: [facility] ENDO (2ND FLR);  Service: Endoscopy;  Laterality: N/A;    CORONARY STENT PLACEMENT  01/2000    DISSECTION OF NECK Bilateral 01/04/2022    Procedure: DISSECTION, NECK;  Surgeon: Naeem Smalls MD;  Location: [facility] OR 2ND FLR;  Service: ENT;  Laterality: Bilateral;    ESOPHAGOGASTRODUODENOSCOPY N/A 09/27/2022    Procedure: EGD (ESOPHAGOGASTRODUODENOSCOPY);  Surgeon: Donnie Peterson MD;  Location: [facility] ENDO (2ND FLR);  Service: Endoscopy;  Laterality: N/A;    ESOPHAGOGASTRODUODENOSCOPY N/A 09/30/2022    Procedure: EGD (ESOPHAGOGASTRODUODENOSCOPY);  Surgeon: Joy Cabrera MD;  Location: [facility] ENDO (2ND FLR);  Service: Endoscopy;  Laterality: N/A;    ESOPHAGOGASTRODUODENOSCOPY W/ PEG N/A 01/04/2022    Procedure: EGD, WITH PEG TUBE INSERTION;  Surgeon: Anthony Calabrese MD;  Location: [facility] OR 2ND FLR;  Service: General;  Laterality: N/A;    EYE SURGERY      Cataract bilateral    femoral  stents      bilateral    FLAP PROCEDURE N/A 01/03/2022    Procedure: CREATION, FREE FLAP;  Surgeon: Naeem Smalls MD;  Location: [facility] OR [facility] FLR;  Service: ENT;  Laterality: N/A;    FLAP PROCEDURE Right 01/04/2022    Procedure: CREATION, FREE FLAP;  Surgeon: Stacey Conde MD;  [facility];  Service: ENT;  Laterality: Right;  Ischemic start 1203  Ischemic stop 1353    GLOSSECTOMY N/A 01/04/2022    Procedure: GLOSSECTOMY;  Surgeon: Naeem Smalls MD;  [facility];  Service: ENT;  Laterality: N/A;    LEFT HEART CATHETERIZATION Left 3/23/2023    Procedure: Left heart cath;  Surgeon: Tacos Benitez MD;  Location: [facility] CATH LAB;  Service: Cardiology;  Laterality: Left;    PERCUTANEOUS TRANSLUMINAL BALLOON ANGIOPLASTY OF CORONARY ARTERY Left 3/27/2023    Procedure: Angioplasty-coronary;  Surgeon: Tim Bhatt MD;  Location: [facility] CATH LAB;  Service: Cardiology;  Laterality: Left;  not before 9am, R rad access, 6 Fr EBU 3.5 guide    RADICAL NECK DISSECTION Left 01/03/2022    Procedure: DISSECTION, NECK, RADICAL;  Surgeon: Naeem Smalls MD;  [facility];  Service: ENT;  Laterality: Left;    SKIN BIOPSY      SKIN CANCER EXCISION      STENT, CORONARY, MULTI VESSEL  3/27/2023    Procedure: Stent, Coronary, Multi Vessel;  Surgeon: Tim Bhatt MD;  Location: [facility] CATH LAB;  Service: Cardiology;;    TRACHEOTOMY N/A 01/04/2022    Procedure: TRACHEOTOMY;  Surgeon: Naeem Smalls MD;  [facility];  Service: ENT;  Laterality: N/A;    VASCULAR SURGERY         Review of patient's allergies indicates:   Allergen Reactions    Ativan [lorazepam] Anxiety       No current facility-administered medications on file prior to encounter.     Current Outpatient Medications on File Prior to Encounter   Medication Sig    albuterol-ipratropium (DUO-NEB) 2.5 mg-0.5 mg/3 mL nebulizer solution Take 3 mLs by nebulization every 4 (four) hours as needed for  Wheezing. Rescue    ascorbic acid, vitamin C, (VITAMIN C) 100 MG tablet Take 100 mg by mouth once daily.    aspirin 81 MG Chew Take 1 tablet (81 mg total) by mouth once daily.    atorvastatin (LIPITOR) 40 MG tablet 1 tablet (40 mg total) by Per G Tube route every evening. (Patient taking differently: 40 mg by Per G Tube route once daily.)    bisacodyL (DULCOLAX) 10 mg Supp Place 1 suppository (10 mg total) rectally daily as needed.    clopidogreL (PLAVIX) 75 mg tablet Take 1 tablet (75 mg total) by mouth once daily. (Patient taking differently: Take 75 mg by mouth nightly.)    ferrous sulfate 325 (65 FE) MG EC tablet Take 325 mg by mouth 3 (three) times daily with meals.    FLUoxetine (PROZAC) 20 mg/5 mL (4 mg/mL) solution Take 20 mg by mouth once daily.    folic acid (FOLVITE) 1 MG tablet Take 1 mg by mouth once daily.    furosemide (LASIX) 40 MG tablet Take 0.5 tablets (20 mg total) by mouth once daily. (Patient taking differently: Take 20 mg by mouth nightly.)    glycopyrrolate (CUVPOSA) 1 mg/5 mL (0.2 mg/mL) Soln Take 5 mLs (1 mg total) by mouth 3 (three) times daily as needed (TO REDUCE SECRETIONS).    hydrOXYzine HCL (ATARAX) 25 MG tablet SMARTSI.5 Tablet(s) Gastro Tube Every 8 Hours PRN    melatonin (MELATIN) 3 mg tablet 1 tablet (3 mg total) by Per G Tube route nightly.    metoprolol tartrate (LOPRESSOR) 25 MG tablet Take 0.5 tablets (12.5 mg total) by mouth 2 (two) times daily.    omeprazole (PRILOSEC) 40 MG capsule Take 40 mg (contents of one capsule) twice daily through PEG tube. Mix contents of capsule with 10 mL applesauce. (Patient taking differently: 40 mg 2 (two) times a day. Take 40 mg (contents of one capsule) twice daily through PEG tube. Mix contents of capsule with 10 mL applesauce.)    polyethylene glycol (GLYCOLAX) 17 gram PwPk 17 g by Per G Tube route 2 (two) times daily.    sodium chloride 3% 3 % nebulizer solution Take 4 mLs by nebulization 2 (two) times a day.    thiamine (VITAMIN B-1)  50 MG tablet Take 50 mg by mouth once daily.    WIXELA INHUB 250-50 mcg/dose diskus inhaler SMARTSI Puff(s) By Mouth Every 12 Hours    [DISCONTINUED] losartan (COZAAR) 50 MG tablet 1 tablet (50 mg total) by Per G Tube route once daily.     Family History    None       Tobacco Use    Smoking status: Former     Current packs/day: 0.00     Average packs/day: 2.0 packs/day for 55.0 years (110.0 ttl pk-yrs)     Types: Cigarettes     Start date: 1963     Quit date: 2018     Years since quittin.7    Smokeless tobacco: Never    Tobacco comments:     3/3 ppd x 40 yrs. Currently 3-4 cigarettes daily .He is trying  to quit. Is using a Vapor cigarettes  2-3 x's a day.   Substance and Sexual Activity    Alcohol use: Not Currently    Drug use: No    Sexual activity: Not Currently     Review of Systems   Constitutional:  Positive for fatigue and fever.   HENT:  Negative for congestion.    Eyes:  Negative for discharge.   Respiratory:  Positive for cough and shortness of breath. Negative for chest tightness.    Cardiovascular:  Negative for chest pain and leg swelling.   Gastrointestinal:  Negative for abdominal pain.   Endocrine: Negative for polyphagia.   Genitourinary:  Negative for difficulty urinating.   Musculoskeletal:  Positive for arthralgias.   Skin:  Negative for color change.   Allergic/Immunologic: Negative for food allergies.   Neurological:  Negative for headaches.   Psychiatric/Behavioral:  Negative for confusion.      Objective:     Vital Signs (Most Recent):  Temp: 97.8 °F (36.6 °C) (24 1437)  Pulse: 90 (24 1400)  Resp: (!) 26 (24 1400)  BP: 117/71 (24 1400)  SpO2: 97 % (24 1400) Vital Signs (24h Range):  Temp:  [97.7 °F (36.5 °C)-97.8 °F (36.6 °C)] 97.8 °F (36.6 °C)  Pulse:  [] 90  Resp:  [18-38] 26  SpO2:  [90 %-100 %] 97 %  BP: ()/(58-83) 117/71     Weight: 58.6 kg (129 lb 3 oz)  Body mass index is 19.08 kg/m².     Physical Exam  Vitals and nursing note  reviewed.   Constitutional:       General: He is not in acute distress.     Appearance: He is ill-appearing (chronically). He is not toxic-appearing.   HENT:      Head: Normocephalic and atraumatic.      Mouth/Throat:      Mouth: Mucous membranes are moist.   Cardiovascular:      Rate and Rhythm: Normal rate and regular rhythm.   Pulmonary:      Effort: Pulmonary effort is normal.      Breath sounds: Normal breath sounds. No wheezing, rhonchi or rales.      Comments: 10L trach collar  Abdominal:      General: Bowel sounds are normal. There is no distension.      Palpations: Abdomen is soft. There is no mass.      Tenderness: There is no abdominal tenderness. There is no guarding.      Comments: PEG in place   Musculoskeletal:      Right lower leg: No edema.      Left lower leg: No edema.   Skin:     General: Skin is warm and dry.   Neurological:      Mental Status: He is alert. Mental status is at baseline.                  DATA:     Laboratory:  CBC:  Recent Labs   Lab 01/14/24  0449 01/15/24  0359 01/16/24  0445   WBC 8.41 7.20 6.50   Hemoglobin 10.1 L 10.1 L 9.9 L   Hematocrit 33.8 L 34.3 L 32.4 L   Platelets 200 184 201         CHEMISTRIES:  Recent Labs   Lab 05/12/22  0343 05/14/22  0724 05/17/22  1007 08/24/22  1000 01/14/24  0449 01/15/24  0359 01/16/24  0445   Glucose 110 96 162 H   < > 87 91 94   Sodium 135 L 136 133 L   < > 140 139 140   Potassium 4.4 4.1 5.0   < > 3.5 3.4 L 4.0   BUN 24 H 18 15   < > 20 23 20   Creatinine 0.7 0.7 0.7   < > 0.8 0.9 0.8   eGFR if African American >60.0 >60 >60.0  --   --   --   --    eGFR if non African American >60.0 >60 >60.0  --   --   --   --    Calcium 9.1 9.6 9.5   < > 9.6 9.5 9.8   Magnesium 2.0  --  2.0   < > 2.0 2.1 1.9    < > = values in this interval not displayed.         CARDIAC BIOMARKERS:  Recent Labs   Lab 01/11/24  0021 01/11/24  0311 01/11/24  0855   Troponin I 0.393 H 1.011 H 3.344 H         COANANCY:  Recent Labs   Lab 04/02/23  0322 11/26/23  0508  01/11/24  0855   INR 1.0 1.1 1.1         LIPIDS/LFTS:  Recent Labs   Lab 08/29/23  1522 10/20/23  0815 01/12/24  0635 01/13/24  0241 01/14/24  0449 01/15/24  0359 01/16/24  0445   Cholesterol 85 L  --  95 L  --   --   --   --    Triglycerides 68  --  60  --   --   --   --    HDL 25 L  --  37 L  --   --   --   --    LDL Cholesterol 46.4 L  --  46.0 L  --   --   --   --    Non-HDL Cholesterol 60  --  58  --   --   --   --    AST 20   < > 55 H   < > 22 21 18   ALT 28   < > 29   < > 25 21 19    < > = values in this interval not displayed.         Hemoglobin A1C   Date Value Ref Range Status   08/29/2023 5.8 (H) 4.0 - 5.6 % Final     Comment:     ADA Screening Guidelines:  5.7-6.4%  Consistent with prediabetes  >or=6.5%  Consistent with diabetes    High levels of fetal hemoglobin interfere with the HbA1C  assay. Heterozygous hemoglobin variants (HbS, HgC, etc)do  not significantly interfere with this assay.   However, presence of multiple variants may affect accuracy.     09/27/2022 6.3 (H) 4.0 - 5.6 % Final     Comment:     ADA Screening Guidelines:  5.7-6.4%  Consistent with prediabetes  >or=6.5%  Consistent with diabetes    High levels of fetal hemoglobin interfere with the HbA1C  assay. Heterozygous hemoglobin variants (HbS, HgC, etc)do  not significantly interfere with this assay.   However, presence of multiple variants may affect accuracy.         TSH  Recent Labs   Lab 05/23/23  0849 07/10/23  1040 08/29/23  1522   TSH 4.379 H 3.610 4.460 H         The ASCVD Risk score (Caitlyn DK, et al., 2019) failed to calculate for the following reasons:    The patient has a prior MI or stroke diagnosis       BNP    Lab Results   Component Value Date/Time    BNP 2,467 (H) 01/11/2024 12:21 AM    BNP 1,708 (H) 11/20/2023 04:06 PM     (H) 10/20/2023 08:15 AM    BNP 1,185 (H) 04/18/2023 07:07 AM    BNP 2,543 (H) 03/21/2023 03:25 PM     (H) 03/18/2023 11:06 AM     (H) 09/23/2022 12:47 PM     (H)  09/14/2022 04:59 AM    BNP 98 05/14/2022 07:25 AM     (H) 04/22/2022 05:04 PM    BNP 1,607 (H) 04/19/2022 09:29 AM     (H) 03/21/2022 02:39 PM     (H) 11/02/2019 02:52 AM     (H) 12/27/2018 01:29 PM            ECHO    Results for orders placed during the hospital encounter of 01/11/24    Echo    Interpretation Summary    Left Ventricle: The left ventricle is normal in size. Mildly increased wall thickness. There is concentric hypertrophy. Normal wall motion. There is low normal systolic function with a visually estimated ejection fraction of 50 - 55%. Unable to assess diastolic function due to E-A fusion.    Right Ventricle: Normal right ventricular cavity size. Systolic function is normal.    Left Atrium: Left atrium is moderately dilated.    Right Atrium: Right atrium is mildly dilated.    Mitral Valve: There is mild regurgitation.    Tricuspid Valve: There is mild regurgitation.    Pulmonary Artery: The estimated pulmonary artery systolic pressure is 13 mmHg.    IVC/SVC: Normal venous pressure at 3 mmHg.      STRESS TEST    No results found for this or any previous visit.        CATH    Results for orders placed during the hospital encounter of 03/18/23    Cardiac catheterization    Conclusion  Description of the findings of the procedure: uneventful LHC/cor angio/Ao angio/iliac angio/PCI prox LAD BRADLEY x1, PCI mid LCx BRADLEY x1/R rad vasband/RFA man comp.    Findings/Key Components:  LVEDP: 3mmHg  LVEF: 60%    Aortic arch: severe calcific atherosclerosis  Ost innominate artery 80%  Prox R SCA 90%, 53mmHg gradient across stenoses.  Prox-mid ICA patent  Distal aortoiliac bifurcation with patent stents in ЮЛИЯ bilaterally and possible severe ISR in R ЮЛИЯ.    Severe diffuse cor Ca++  Dominance: Right  LM: MLI  LAD: prox 95% at D1, mid  Ramus: MLI  LCx: mid 90%  RCA: not injected, diffuse disease, severe dist RCA stenosis (see Dr. Benitez cath report    PCI prox LAD:  Preop ASA/Plavix, intraop  heparin  Wired LAD/D1  Predil 2.5x12, 2.75x15 NC  Stent 3.0x24 Synergy BRADLEY  Post IVUS with mild underexpansion mid stent  Postdil 3.25x20 NC at 22atm  Excellent angiographic result, T3 flow, 0% residual stenosis    PCI mid LCx  Predil 2.5x12  Stent 2.75x20 Synergy BRADLEY 14 fidelia  Unavble to pass IVUS due to severe prox LCx tortuosity  Excellent angiographic result, T3 flow, 0% residual stenosis    Hemostasis:  R Radial band  RFA man comp

## 2024-01-16 NOTE — CONSULTS
[facility] [place] [facility]  Infectious Disease  Consult Note    Patient Name: Fareed Richard Jr.  MRN: [id number]  Admission Date: 1/11/2024  Hospital Length of Stay: 5 days  Attending Physician: Nikita Castillo III, MD  Primary Care Provider: Kodi Tubbs MD     Isolation Status: No active isolations    Patient information was obtained from patient and ER records.      Inpatient consult to Infectious Diseases  Consult performed by: Laura Robins MD  Consult ordered by: Santos Marlow MD        Assessment/Plan:     Pulmonary  Ventilator associated pneumonia  Fareed Richard Jr. 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 He has history of multiple ventilator associated pneumonias given trach. Culture with pseudomonas aeruginosa and achromobacter.  - complete 7 day course of pip-tazo 4 gram q8 hours, end date 1/17/24      Above discussed with primary team.     Time: 55 minutes   50% of time spent on face-to-face counseling and coordination of care. Counseling included review of test results, diagnosis, and treatment plan with patient and/or family.  I have reviewed hospital notes from HM service and other specialty providers. I have also reviewed CBC, CMP/BMP,  cultures and imaging with my interpretation as documented.     Thank you for your consult. I will sign off. Please contact us if you have any additional questions.    Laura Robins MD  Infectious Disease  [facility] [place]    Subjective:     Principal Problem: Acute on chronic respiratory failure with hypoxia    HPI: Fareed Richard Jr. 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 He is on 6L trach collar at home, had  associated fever, and increased trach output. He feels improved  today from admission. Denies any pain anywhere else. Denies abdominal pain, no sacral wound.     Past Medical History:   Diagnosis Date    Cancer     skin to Rt forearm and face    COPD (chronic obstructive pulmonary disease)     Hyperlipidemia     Hypertension     Pseudoaneurysm        Past Surgical History:   Procedure Laterality Date    ABDOMINAL SURGERY      stents placed in liver and large intestines, per patient    ANGIOGRAPHY OF AORTIC ARCH  3/27/2023    Procedure: Aortogram, Aortic Arch;  Surgeon: Tim Bhatt MD;  Location: [facility] CATH LAB;  Service: Cardiology;;    AORTOGRAPHY WITH SERIALOGRAPHY N/A 3/27/2023    Procedure: AORTOGRAM, WITH SERIALOGRAPHY;  Surgeon: Tim Bhatt MD;  Location: [facility] CATH LAB;  Service: Cardiology;  Laterality: N/A;    CAROTID STENT      COLONOSCOPY N/A 09/27/2022    Procedure: COLONOSCOPY;  Surgeon: Donnie Peterson MD;  Location: [facility] ENDO (2ND FLR);  Service: Endoscopy;  Laterality: N/A;    COLONOSCOPY N/A 09/30/2022    Procedure: COLONOSCOPY;  Surgeon: Joy Cabrera MD;  Location: [facility] (2ND FLR);  Service: Endoscopy;  Laterality: N/A;    CORONARY STENT PLACEMENT  01/2000    DISSECTION OF NECK Bilateral 01/04/2022    Procedure: DISSECTION, NECK;  Surgeon: Naeem Smalls MD;  Location: [facility] OR 2ND FLR;  Service: ENT;  Laterality: Bilateral;    ESOPHAGOGASTRODUODENOSCOPY N/A 09/27/2022    Procedure: EGD (ESOPHAGOGASTRODUODENOSCOPY);  Surgeon: Donnie Peterson MD;  Location: [facility] ENDO (2ND FLR);  Service: Endoscopy;  Laterality: N/A;    ESOPHAGOGASTRODUODENOSCOPY N/A 09/30/2022    Procedure: EGD (ESOPHAGOGASTRODUODENOSCOPY);  Surgeon: Joy Cabrera MD;  Location: [facility] ENDO (2ND FLR);  Service: Endoscopy;  Laterality: N/A;    ESOPHAGOGASTRODUODENOSCOPY W/ PEG N/A 01/04/2022    Procedure: EGD, WITH PEG TUBE INSERTION;  Surgeon: Anthony Calabrese MD;  Location: [facility] OR 2ND FLR;  Service: General;  Laterality: N/A;    EYE SURGERY      Cataract bilateral    femoral  stents      bilateral    FLAP PROCEDURE N/A 01/03/2022    Procedure: CREATION, FREE FLAP;  Surgeon: Naeem Smalls MD;  Location: [facility] OR [facility] FLR;  Service: ENT;  Laterality: N/A;    FLAP PROCEDURE Right 01/04/2022    Procedure: CREATION, FREE FLAP;  Surgeon: Stacey Conde MD;  [facility];  Service: ENT;  Laterality: Right;  Ischemic start 1203  Ischemic stop 1353    GLOSSECTOMY N/A 01/04/2022    Procedure: GLOSSECTOMY;  Surgeon: Naeem Smalls MD;  Location: [facility] OR [facility] FLR;  Service: ENT;  Laterality: N/A;    LEFT HEART CATHETERIZATION Left 3/23/2023    Procedure: Left heart cath;  Surgeon: Tacos Benitez MD;  Location: [facility] CATH LAB;  Service: Cardiology;  Laterality: Left;    PERCUTANEOUS TRANSLUMINAL BALLOON ANGIOPLASTY OF CORONARY ARTERY Left 3/27/2023    Procedure: Angioplasty-coronary;  Surgeon: Tim Bhatt MD;  Location: [facility] CATH LAB;  Service: Cardiology;  Laterality: Left;  not before 9am, R rad access, 6 Fr EBU 3.5 guide    RADICAL NECK DISSECTION Left 01/03/2022    Procedure: DISSECTION, NECK, RADICAL;  Surgeon: Naeem Smalls MD;  [facility];  Service: ENT;  Laterality: Left;    SKIN BIOPSY      SKIN CANCER EXCISION      STENT, CORONARY, MULTI VESSEL  3/27/2023    Procedure: Stent, Coronary, Multi Vessel;  Surgeon: Tim Bhatt MD;  Location: [facility] CATH LAB;  Service: Cardiology;;    TRACHEOTOMY N/A 01/04/2022    Procedure: TRACHEOTOMY;  Surgeon: Naeem Smalls MD;  [facility];  Service: ENT;  Laterality: N/A;    VASCULAR SURGERY         Review of patient's allergies indicates:   Allergen Reactions    Ativan [lorazepam] Anxiety       Medications:  Medications Prior to Admission   Medication Sig    albuterol-ipratropium (DUO-NEB) 2.5 mg-0.5 mg/3 mL nebulizer solution Take 3 mLs by nebulization every 4 (four) hours as needed for Wheezing. Rescue    ascorbic acid, vitamin C, (VITAMIN C) 100 MG tablet Take 100 mg by mouth  once daily.    aspirin 81 MG Chew Take 1 tablet (81 mg total) by mouth once daily.    atorvastatin (LIPITOR) 40 MG tablet 1 tablet (40 mg total) by Per G Tube route every evening. (Patient taking differently: 40 mg by Per G Tube route once daily.)    bisacodyL (DULCOLAX) 10 mg Supp Place 1 suppository (10 mg total) rectally daily as needed.    clopidogreL (PLAVIX) 75 mg tablet Take 1 tablet (75 mg total) by mouth once daily. (Patient taking differently: Take 75 mg by mouth nightly.)    ferrous sulfate 325 (65 FE) MG EC tablet Take 325 mg by mouth 3 (three) times daily with meals.    FLUoxetine (PROZAC) 20 mg/5 mL (4 mg/mL) solution Take 20 mg by mouth once daily.    folic acid (FOLVITE) 1 MG tablet Take 1 mg by mouth once daily.    furosemide (LASIX) 40 MG tablet Take 0.5 tablets (20 mg total) by mouth once daily. (Patient taking differently: Take 20 mg by mouth nightly.)    glycopyrrolate (CUVPOSA) 1 mg/5 mL (0.2 mg/mL) Soln Take 5 mLs (1 mg total) by mouth 3 (three) times daily as needed (TO REDUCE SECRETIONS).    hydrOXYzine HCL (ATARAX) 25 MG tablet SMARTSI.5 Tablet(s) Gastro Tube Every 8 Hours PRN    melatonin (MELATIN) 3 mg tablet 1 tablet (3 mg total) by Per G Tube route nightly.    metoprolol tartrate (LOPRESSOR) 25 MG tablet Take 0.5 tablets (12.5 mg total) by mouth 2 (two) times daily.    omeprazole (PRILOSEC) 40 MG capsule Take 40 mg (contents of one capsule) twice daily through PEG tube. Mix contents of capsule with 10 mL applesauce. (Patient taking differently: 40 mg 2 (two) times a day. Take 40 mg (contents of one capsule) twice daily through PEG tube. Mix contents of capsule with 10 mL applesauce.)    polyethylene glycol (GLYCOLAX) 17 gram PwPk 17 g by Per G Tube route 2 (two) times daily.    sodium chloride 3% 3 % nebulizer solution Take 4 mLs by nebulization 2 (two) times a day.    thiamine (VITAMIN B-1) 50 MG tablet Take 50 mg by mouth once daily.    WIXELA INHUB 250-50 mcg/dose diskus inhaler  SMARTSI Puff(s) By Mouth Every 12 Hours     Antibiotics (From admission, onward)      Start     Stop Route Frequency Ordered    24 1207  piperacillin-tazobactam (ZOSYN) 4.5 g in dextrose 5 % in water (D5W) 100 mL IVPB (MB+)         -- IV Every 8 hours (non-standard times) 24 1207    24 0930  mupirocin 2 % ointment         24 0859 Nasl 2 times daily 24 0828          Antifungals (From admission, onward)      None          Antivirals (From admission, onward)      None             Immunization History   Administered Date(s) Administered    COVID-19, MRNA, LN-S, PF (MODERNA FULL 0.5 ML DOSE) 2021, 2021, 2021       Family History    None       Social History     Socioeconomic History    Marital status:    Tobacco Use    Smoking status: Former     Current packs/day: 0.00     Average packs/day: 2.0 packs/day for 55.0 years (110.0 ttl pk-yrs)     Types: Cigarettes     Start date: 1963     Quit date: 2018     Years since quittin.7    Smokeless tobacco: Never    Tobacco comments:     3/3 ppd x 40 yrs. Currently 3-4 cigarettes daily .He is trying  to quit. Is using a Vapor cigarettes  2-3 x's a day.   Substance and Sexual Activity    Alcohol use: Not Currently    Drug use: No    Sexual activity: Not Currently     Social Determinants of Health     Financial Resource Strain: Low Risk  (2023)    Overall Financial Resource Strain (CARDIA)     Difficulty of Paying Living Expenses: Not very hard   Food Insecurity: No Food Insecurity (2023)    Hunger Vital Sign     Worried About Running Out of Food in the Last Year: Never true     Ran Out of Food in the Last Year: Never true   Transportation Needs: No Transportation Needs (2023)    PRAPARE - Transportation     Lack of Transportation (Medical): No     Lack of Transportation (Non-Medical): No   Physical Activity: Inactive (2023)    Exercise Vital Sign     Days of Exercise per Week: 0 days      Minutes of Exercise per Session: 0 min   Social Connections: Moderately Isolated (11/21/2023)    Social Connection and Isolation Panel [NHANES]     Frequency of Communication with Friends and Family: Three times a week     Frequency of Social Gatherings with Friends and Family: Three times a week     Attends Baptism Services: Never     Active Member of Clubs or Organizations: No     Attends Club or Organization Meetings: Never     Marital Status:    Housing Stability: Low Risk  (11/21/2023)    Housing Stability Vital Sign     Unable to Pay for Housing in the Last Year: No     Number of Places Lived in the Last Year: 1     Unstable Housing in the Last Year: No     Review of Systems   Constitutional:  Positive for fatigue. Negative for fever.   Eyes: Negative.    Respiratory:  Positive for shortness of breath.    Cardiovascular: Negative.    Gastrointestinal: Negative.    Endocrine: Negative.    Genitourinary: Negative.    Musculoskeletal: Negative.    Skin: Negative.    Allergic/Immunologic: Negative.    Neurological: Negative.    Hematological: Negative.    Psychiatric/Behavioral: Negative.       Objective:     Vital Signs (Most Recent):  Temp: 97.8 °F (36.6 °C) (01/16/24 1437)  Pulse: 90 (01/16/24 1400)  Resp: (!) 26 (01/16/24 1400)  BP: 117/71 (01/16/24 1400)  SpO2: 97 % (01/16/24 1400) Vital Signs (24h Range):  Temp:  [97.7 °F (36.5 °C)-97.8 °F (36.6 °C)] 97.8 °F (36.6 °C)  Pulse:  [] 90  Resp:  [18-38] 26  SpO2:  [90 %-100 %] 97 %  BP: ()/(58-83) 117/71     Weight: 58.6 kg (129 lb 3 oz)  Body mass index is 19.08 kg/m².    Estimated Creatinine Clearance: 67.1 mL/min (based on SCr of 0.8 mg/dL).     Physical Exam  Vitals and nursing note reviewed.   Constitutional:       General: He is not in acute distress.     Appearance: He is ill-appearing. He is not toxic-appearing.   HENT:      Head: Normocephalic.   Neck:      Comments: Trach collar  Cardiovascular:      Rate and Rhythm: Normal rate.       Heart sounds: No murmur heard.  Pulmonary:      Effort: Pulmonary effort is normal.   Abdominal:      Palpations: Abdomen is soft.      Comments: PEG tube   Musculoskeletal:         General: No swelling or tenderness.   Skin:     General: Skin is warm and dry.          Significant Labs:   Microbiology Results (last 7 days)       Procedure Component Value Units Date/Time    Blood Culture #1 **CANNOT BE ORDERED STAT** [[id number]] Collected: 01/11/24 0116    Order Status: Completed Specimen: Blood from Peripheral, Forearm, Right Updated: 01/15/24 0303     Blood Culture, Routine No Growth after 4 days.    Blood culture [[id number]] Collected: 01/11/24 0144    Order Status: Completed Specimen: Blood from Peripheral, Lower Arm, Left Updated: 01/15/24 0303     Blood Culture, Routine No Growth after 4 days.    Culture, Respiratory with Gram Stain [[id number]]  (Abnormal)  (Susceptibility) Collected: 01/11/24 0053    Order Status: Completed Specimen: Sputum Updated: 01/14/24 0631     Respiratory Culture PSEUDOMONAS AERUGINOSA  Many        ACHROMOBACTER XYLOSOXIDANS SUBSP. DENTRIFICANS  Few       Gram Stain (Respiratory) <10 epithelial cells per low power field.     Gram Stain (Respiratory) Moderate WBC's     Gram Stain (Respiratory) Moderate Gram negative rods    Culture, Respiratory with Gram Stain [[id number]]  (Abnormal) Collected: 01/11/24 0729    Order Status: Completed Specimen: Respiratory from Tracheal Aspirate Updated: 01/13/24 0833     Respiratory Culture PRESUMPTIVE PSEUDOMONAS SPECIES  Many  For susceptibility see order # [id number]       Gram Stain (Respiratory) Moderate WBC's     Gram Stain (Respiratory) Moderate Gram negative rods     Gram Stain (Respiratory) Few Gram positive cocci in pairs     Gram Stain (Respiratory) Rare Gram positive rods            Significant Imaging: I have reviewed all pertinent imaging results/findings within the past 24 hours.

## 2024-01-16 NOTE — SUBJECTIVE & OBJECTIVE
Interval History: feels ok. Still with a lot of secretions.     Review of Systems  Objective:     Vital Signs (Most Recent):  Temp: 97.7 °F (36.5 °C) (01/16/24 0830)  Pulse: 98 (01/16/24 1000)  Resp: (!) 30 (01/16/24 1000)  BP: 113/74 (01/16/24 1000)  SpO2: 97 % (01/16/24 1000) Vital Signs (24h Range):  Temp:  [97.7 °F (36.5 °C)-98.4 °F (36.9 °C)] 97.7 °F (36.5 °C)  Pulse:  [] 98  Resp:  [18-38] 30  SpO2:  [90 %-100 %] 97 %  BP: ()/(57-83) 113/74     Weight: 58.6 kg (129 lb 3 oz)  Body mass index is 19.08 kg/m².    Intake/Output Summary (Last 24 hours) at 1/16/2024 1027  Last data filed at 1/16/2024 0926  Gross per 24 hour   Intake 1302.34 ml   Output 1150 ml   Net 152.34 ml         Physical Exam  Vitals reviewed.   Constitutional:       General: He is not in acute distress.     Appearance: He is ill-appearing.   HENT:      Head: Normocephalic and atraumatic.      Mouth/Throat:      Mouth: Mucous membranes are dry.   Eyes:      General: No scleral icterus.     Extraocular Movements: Extraocular movements intact.   Cardiovascular:      Rate and Rhythm: Normal rate and regular rhythm.   Pulmonary:      Effort: Pulmonary effort is normal. Respiratory distress: course respirations.   Abdominal:      Palpations: Abdomen is soft.      Tenderness: There is no abdominal tenderness.   Musculoskeletal:         General: No swelling or tenderness.      Cervical back: Neck supple. No rigidity.   Skin:     General: Skin is warm and dry.   Neurological:      General: No focal deficit present.      Mental Status: He is alert and oriented to person, place, and time.   Psychiatric:         Mood and Affect: Mood normal.         Behavior: Behavior normal.             Significant Labs: All pertinent labs within the past 24 hours have been reviewed.    Significant Imaging: I have reviewed all pertinent imaging results/findings within the past 24 hours.

## 2024-01-16 NOTE — PROGRESS NOTES
[facility] Intensive Care  Cardiology  Progress Note    Patient Name: Fraeed Richard Jr.  MRN: [id number]  Admission Date: 1/11/2024  Hospital Length of Stay: 4 days  Code Status: Full Code   Attending Physician: Nikita Castillo III, MD   Primary Care Physician: Kodi Tubbs MD  Expected Discharge Date: 1/16/2024  Principal Problem:Acute on chronic respiratory failure with hypoxia    Subjective:     Interval History:  Continues to be chest pain-free.        Past Medical History:   Diagnosis Date    Cancer     skin to Rt forearm and face    COPD (chronic obstructive pulmonary disease)     Hyperlipidemia     Hypertension     Pseudoaneurysm        Past Surgical History:   Procedure Laterality Date    ABDOMINAL SURGERY      stents placed in liver and large intestines, per patient    ANGIOGRAPHY OF AORTIC ARCH  3/27/2023    Procedure: Aortogram, Aortic Arch;  Surgeon: Tim Bhatt MD;  Location: [facility] CATH LAB;  Service: Cardiology;;    AORTOGRAPHY WITH SERIALOGRAPHY N/A 3/27/2023    Procedure: AORTOGRAM, WITH SERIALOGRAPHY;  Surgeon: Tim Bhatt MD;  Location: [facility] CATH LAB;  Service: Cardiology;  Laterality: N/A;    CAROTID STENT      COLONOSCOPY N/A 09/27/2022    Procedure: COLONOSCOPY;  Surgeon: Donnie Peterson MD;  [facility]  Service: Endoscopy;  Laterality: N/A;    COLONOSCOPY N/A 09/30/2022    Procedure: COLONOSCOPY;  Surgeon: Joy Cabrera MD;  [facility]O [facility]  Service: Endoscopy;  Laterality: N/A;    CORONARY STENT PLACEMENT  01/2000    DISSECTION OF NECK Bilateral 01/04/2022    Procedure: DISSECTION, NECK;  Surgeon: Naeem Smalls MD;  [facility];  Service: ENT;  Laterality: Bilateral;    ESOPHAGOGASTRODUODENOSCOPY N/A 09/27/2022    Procedure: EGD (ESOPHAGOGASTRODUODENOSCOPY);  Surgeon: Donnie Peterson MD;  [facility]O [facility]  Service: Endoscopy;  Laterality: N/A;    ESOPHAGOGASTRODUODENOSCOPY N/A 09/30/2022    Procedure: EGD  (ESOPHAGOGASTRODUODENOSCOPY);  Surgeon: Joy Cabrera MD;  Location: [facility] ENDO (2ND FLR);  Service: Endoscopy;  Laterality: N/A;    ESOPHAGOGASTRODUODENOSCOPY W/ PEG N/A 01/04/2022    Procedure: EGD, WITH PEG TUBE INSERTION;  Surgeon: Anthony Calabrese MD;  [facility];  Service: General;  Laterality: N/A;    EYE SURGERY      Cataract bilateral    femoral stents      bilateral    FLAP PROCEDURE N/A 01/03/2022    Procedure: CREATION, FREE FLAP;  Surgeon: Naeem Smalls MD;  [facility];  Service: ENT;  Laterality: N/A;    FLAP PROCEDURE Right 01/04/2022    Procedure: CREATION, FREE FLAP;  Surgeon: Stacey Conde MD;  [facility];  Service: ENT;  Laterality: Right;  Ischemic start 1203  Ischemic stop 1353    GLOSSECTOMY N/A 01/04/2022    Procedure: GLOSSECTOMY;  Surgeon: Naeem Smalls MD;  Location: [facility] OR [facility] FLR;  Service: ENT;  Laterality: N/A;    LEFT HEART CATHETERIZATION Left 3/23/2023    Procedure: Left heart cath;  Surgeon: Tacos Benitez MD;  Location: [facility] CATH LAB;  Service: Cardiology;  Laterality: Left;    PERCUTANEOUS TRANSLUMINAL BALLOON ANGIOPLASTY OF CORONARY ARTERY Left 3/27/2023    Procedure: Angioplasty-coronary;  Surgeon: Tim Bhatt MD;  Location: [facility] CATH LAB;  Service: Cardiology;  Laterality: Left;  not before 9am, R rad access, 6 Fr EBU 3.5 guide    RADICAL NECK DISSECTION Left 01/03/2022    Procedure: DISSECTION, NECK, RADICAL;  Surgeon: Naeem Smalls MD;  [facility];  Service: ENT;  Laterality: Left;    SKIN BIOPSY      SKIN CANCER EXCISION      STENT, CORONARY, MULTI VESSEL  3/27/2023    Procedure: Stent, Coronary, Multi Vessel;  Surgeon: Tim Bhatt MD;  Location: [facility] CATH LAB;  Service: Cardiology;;    TRACHEOTOMY N/A 01/04/2022    Procedure: TRACHEOTOMY;  Surgeon: Naeem Smalls MD;  [facility];  Service: ENT;  Laterality: N/A;    VASCULAR SURGERY         Review of  patient's allergies indicates:   Allergen Reactions    Ativan [lorazepam] Anxiety       No current facility-administered medications on file prior to encounter.     Current Outpatient Medications on File Prior to Encounter   Medication Sig    albuterol-ipratropium (DUO-NEB) 2.5 mg-0.5 mg/3 mL nebulizer solution Take 3 mLs by nebulization every 4 (four) hours as needed for Wheezing. Rescue    ascorbic acid, vitamin C, (VITAMIN C) 100 MG tablet Take 100 mg by mouth once daily.    aspirin 81 MG Chew Take 1 tablet (81 mg total) by mouth once daily.    atorvastatin (LIPITOR) 40 MG tablet 1 tablet (40 mg total) by Per G Tube route every evening. (Patient taking differently: 40 mg by Per G Tube route once daily.)    bisacodyL (DULCOLAX) 10 mg Supp Place 1 suppository (10 mg total) rectally daily as needed.    clopidogreL (PLAVIX) 75 mg tablet Take 1 tablet (75 mg total) by mouth once daily. (Patient taking differently: Take 75 mg by mouth nightly.)    ferrous sulfate 325 (65 FE) MG EC tablet Take 325 mg by mouth 3 (three) times daily with meals.    FLUoxetine (PROZAC) 20 mg/5 mL (4 mg/mL) solution Take 20 mg by mouth once daily.    folic acid (FOLVITE) 1 MG tablet Take 1 mg by mouth once daily.    furosemide (LASIX) 40 MG tablet Take 0.5 tablets (20 mg total) by mouth once daily. (Patient taking differently: Take 20 mg by mouth nightly.)    glycopyrrolate (CUVPOSA) 1 mg/5 mL (0.2 mg/mL) Soln Take 5 mLs (1 mg total) by mouth 3 (three) times daily as needed (TO REDUCE SECRETIONS).    hydrOXYzine HCL (ATARAX) 25 MG tablet SMARTSI.5 Tablet(s) Gastro Tube Every 8 Hours PRN    melatonin (MELATIN) 3 mg tablet 1 tablet (3 mg total) by Per G Tube route nightly.    metoprolol tartrate (LOPRESSOR) 25 MG tablet Take 0.5 tablets (12.5 mg total) by mouth 2 (two) times daily.    omeprazole (PRILOSEC) 40 MG capsule Take 40 mg (contents of one capsule) twice daily through PEG tube. Mix contents of capsule with 10 mL applesauce. (Patient  taking differently: 40 mg 2 (two) times a day. Take 40 mg (contents of one capsule) twice daily through PEG tube. Mix contents of capsule with 10 mL applesauce.)    polyethylene glycol (GLYCOLAX) 17 gram PwPk 17 g by Per G Tube route 2 (two) times daily.    sodium chloride 3% 3 % nebulizer solution Take 4 mLs by nebulization 2 (two) times a day.    thiamine (VITAMIN B-1) 50 MG tablet Take 50 mg by mouth once daily.    WIXELA INHUB 250-50 mcg/dose diskus inhaler SMARTSI Puff(s) By Mouth Every 12 Hours    [DISCONTINUED] losartan (COZAAR) 50 MG tablet 1 tablet (50 mg total) by Per G Tube route once daily.     Family History    None       Tobacco Use    Smoking status: Former     Current packs/day: 0.00     Average packs/day: 2.0 packs/day for 55.0 years (110.0 ttl pk-yrs)     Types: Cigarettes     Start date: 1963     Quit date: 2018     Years since quittin.7    Smokeless tobacco: Never    Tobacco comments:     3/3 ppd x 40 yrs. Currently 3-4 cigarettes daily .He is trying  to quit. Is using a Vapor cigarettes  2-3 x's a day.   Substance and Sexual Activity    Alcohol use: Not Currently    Drug use: No    Sexual activity: Not Currently     Review of Systems   Constitutional:  Positive for fatigue and fever.   HENT:  Negative for congestion.    Eyes:  Negative for discharge.   Respiratory:  Positive for cough and shortness of breath. Negative for chest tightness.    Cardiovascular:  Negative for chest pain and leg swelling.   Gastrointestinal:  Negative for abdominal pain.   Endocrine: Negative for polyphagia.   Genitourinary:  Negative for difficulty urinating.   Musculoskeletal:  Positive for arthralgias.   Skin:  Negative for color change.   Allergic/Immunologic: Negative for food allergies.   Neurological:  Negative for headaches.   Psychiatric/Behavioral:  Negative for confusion.      Objective:     Vital Signs (Most Recent):  Temp: 98.4 °F (36.9 °C) (01/15/24 1501)  Pulse: 94 (01/15/24  2003)  Resp: (!) 24 (01/15/24 2003)  BP: 113/73 (01/15/24 1900)  SpO2: (!) 94 % (01/15/24 2003) Vital Signs (24h Range):  Temp:  [97.7 °F (36.5 °C)-98.4 °F (36.9 °C)] 98.4 °F (36.9 °C)  Pulse:  [] 94  Resp:  [17-47] 24  SpO2:  [92 %-100 %] 94 %  BP: ()/(57-80) 113/73     Weight: 59.9 kg (132 lb)  Body mass index is 19.49 kg/m².     Physical Exam  Vitals and nursing note reviewed.   Constitutional:       General: He is not in acute distress.     Appearance: He is ill-appearing (chronically). He is not toxic-appearing.   HENT:      Head: Normocephalic and atraumatic.      Mouth/Throat:      Mouth: Mucous membranes are moist.   Cardiovascular:      Rate and Rhythm: Normal rate and regular rhythm.   Pulmonary:      Effort: Pulmonary effort is normal.      Breath sounds: Normal breath sounds. No wheezing, rhonchi or rales.      Comments: 10L trach collar  Abdominal:      General: Bowel sounds are normal. There is no distension.      Palpations: Abdomen is soft. There is no mass.      Tenderness: There is no abdominal tenderness. There is no guarding.      Comments: PEG in place   Musculoskeletal:      Right lower leg: No edema.      Left lower leg: No edema.   Skin:     General: Skin is warm and dry.   Neurological:      Mental Status: He is alert. Mental status is at baseline.                  DATA:     Laboratory:  CBC:  Recent Labs   Lab 01/13/24  0241 01/14/24  0449 01/15/24  0359   WBC 9.56 8.41 7.20   Hemoglobin 9.5 L 10.1 L 10.1 L   Hematocrit 30.9 L 33.8 L 34.3 L   Platelets 172 200 184         CHEMISTRIES:  Recent Labs   Lab 05/12/22  0343 05/14/22  0724 05/17/22  1007 08/24/22  1000 01/13/24  0241 01/14/24  0449 01/15/24  0359   Glucose 110 96 162 H   < > 328 H 87 91   Sodium 135 L 136 133 L   < > 133 L 140 139   Potassium 4.4 4.1 5.0   < > 3.0 L 3.5 3.4 L   BUN 24 H 18 15   < > 22 20 23   Creatinine 0.7 0.7 0.7   < > 1.0 0.8 0.9   eGFR if  >60.0 >60 >60.0  --   --   --   --    eGFR  if non  >60.0 >60 >60.0  --   --   --   --    Calcium 9.1 9.6 9.5   < > 8.7 9.6 9.5   Magnesium 2.0  --  2.0   < > 1.7 2.0 2.1    < > = values in this interval not displayed.         CARDIAC BIOMARKERS:  Recent Labs   Lab 01/11/24  0021 01/11/24  0311 01/11/24  0855   Troponin I 0.393 H 1.011 H 3.344 H         COAGS:  Recent Labs   Lab 04/02/23  0322 11/26/23  0505 01/11/24  0855   INR 1.0 1.1 1.1         LIPIDS/LFTS:  Recent Labs   Lab 08/29/23  1522 10/20/23  0815 01/12/24  0635 01/13/24  0241 01/14/24  0449 01/15/24  0359   Cholesterol 85 L  --  95 L  --   --   --    Triglycerides 68  --  60  --   --   --    HDL 25 L  --  37 L  --   --   --    LDL Cholesterol 46.4 L  --  46.0 L  --   --   --    Non-HDL Cholesterol 60  --  58  --   --   --    AST 20   < > 55 H 38 22 21   ALT 28   < > 29 33 25 21    < > = values in this interval not displayed.         Hemoglobin A1C   Date Value Ref Range Status   08/29/2023 5.8 (H) 4.0 - 5.6 % Final     Comment:     ADA Screening Guidelines:  5.7-6.4%  Consistent with prediabetes  >or=6.5%  Consistent with diabetes    High levels of fetal hemoglobin interfere with the HbA1C  assay. Heterozygous hemoglobin variants (HbS, HgC, etc)do  not significantly interfere with this assay.   However, presence of multiple variants may affect accuracy.     09/27/2022 6.3 (H) 4.0 - 5.6 % Final     Comment:     ADA Screening Guidelines:  5.7-6.4%  Consistent with prediabetes  >or=6.5%  Consistent with diabetes    High levels of fetal hemoglobin interfere with the HbA1C  assay. Heterozygous hemoglobin variants (HbS, HgC, etc)do  not significantly interfere with this assay.   However, presence of multiple variants may affect accuracy.         TSH  Recent Labs   Lab 05/23/23  0849 07/10/23  1040 08/29/23  1522   TSH 4.379 H 3.610 4.460 H         The ASCVD Risk score (Caitlyn DUPONT, et al., 2019) failed to calculate for the following reasons:    The patient has a prior MI or stroke  diagnosis       BNP    Lab Results   Component Value Date/Time    BNP 2,467 (H) 01/11/2024 12:21 AM    BNP 1,708 (H) 11/20/2023 04:06 PM     (H) 10/20/2023 08:15 AM    BNP 1,185 (H) 04/18/2023 07:07 AM    BNP 2,543 (H) 03/21/2023 03:25 PM     (H) 03/18/2023 11:06 AM     (H) 09/23/2022 12:47 PM     (H) 09/14/2022 04:59 AM    BNP 98 05/14/2022 07:25 AM     (H) 04/22/2022 05:04 PM    BNP 1,607 (H) 04/19/2022 09:29 AM     (H) 03/21/2022 02:39 PM     (H) 11/02/2019 02:52 AM     (H) 12/27/2018 01:29 PM            ECHO    Results for orders placed during the hospital encounter of 01/11/24    Echo    Interpretation Summary    Left Ventricle: The left ventricle is normal in size. Mildly increased wall thickness. There is concentric hypertrophy. Normal wall motion. There is low normal systolic function with a visually estimated ejection fraction of 50 - 55%. Unable to assess diastolic function due to E-A fusion.    Right Ventricle: Normal right ventricular cavity size. Systolic function is normal.    Left Atrium: Left atrium is moderately dilated.    Right Atrium: Right atrium is mildly dilated.    Mitral Valve: There is mild regurgitation.    Tricuspid Valve: There is mild regurgitation.    Pulmonary Artery: The estimated pulmonary artery systolic pressure is 13 mmHg.    IVC/SVC: Normal venous pressure at 3 mmHg.      STRESS TEST    No results found for this or any previous visit.        CATH    Results for orders placed during the hospital encounter of 03/18/23    Cardiac catheterization    Conclusion  Description of the findings of the procedure: uneventful LHC/cor angio/Ao angio/iliac angio/PCI prox LAD BRADLEY x1, PCI mid LCx BRADLEY x1/R rad vasband/RFA man comp.    Findings/Key Components:  LVEDP: 3mmHg  LVEF: 60%    Aortic arch: severe calcific atherosclerosis  Ost innominate artery 80%  Prox R SCA 90%, 53mmHg gradient across stenoses.  Prox-mid ICA patent  Distal  aortoiliac bifurcation with patent stents in ЮЛИЯ bilaterally and possible severe ISR in R ЮЛИЯ.    Severe diffuse cor Ca++  Dominance: Right  LM: MLI  LAD: prox 95% at D1, mid  Ramus: MLI  LCx: mid 90%  RCA: not injected, diffuse disease, severe dist RCA stenosis (see Dr. Benitez cath report    PCI prox LAD:  Preop ASA/Plavix, intraop heparin  Wired LAD/D1  Predil 2.5x12, 2.75x15 NC  Stent 3.0x24 Synergy BRADLEY  Post IVUS with mild underexpansion mid stent  Postdil 3.25x20 NC at 22atm  Excellent angiographic result, T3 flow, 0% residual stenosis    PCI mid LCx  Predil 2.5x12  Stent 2.75x20 Synergy BRADLEY 14 fidelia  Unavble to pass IVUS due to severe prox LCx tortuosity  Excellent angiographic result, T3 flow, 0% residual stenosis    Hemostasis:  R Radial band  RFA man comp  Assessment and Plan:     Brief HPI:     * Acute on chronic respiratory failure with hypoxia        Pneumonia        NSTEMI (non-ST elevated myocardial infarction)  TYPE 1 VERSUS TYPE 2.  PATIENT IS CHEST PAIN-FREE.  STATES THAT PRIOR TO HIS PCI WHEN HE HAD THE NON-STEMI LAST TIME, HE DID HAVE CHEST PAINS.  EKG DOES SHOW ST DEPRESSIONS.  CASE DISCUSSED IN DETAIL WITH THE DR. TAMAYO WHO IS THE PATIENT'S PRIMARY CARDIOLOGIST.  LAST PCI WAS COMPLICATED BY PULMONARY AND RETROPERITONEAL HEMORRHAGE.  WHEN THE PATIENT SAW DR. TAMAYO IN CLINIC AS AN OUTPATIENT, DR. TAMAYO HAD RECOMMENDED PALLIATIVE CARE CONSULT.  TODAY WITH ME AND DR. TAMAYO HAD A DETAILED DISCUSSION AND CONSIDERING HIS POOR STATE OF HEALTH, MULTIPLE COMORBIDITIES, THE FACT THAT HE IS CHEST PAIN-FREE AND THE FACT THAT HE HAD MULTIPLE COMPLICATIONS CHRONIC PULMONARY HEMORRHAGE AS WELL AS RETROPERITONEAL HEMORRHAGE AFTER HIS LAST CATHETERIZATION AND PCI BY DR. WEST, WE RECOMMEND MEDICAL MANAGEMENT AND PALLIATIVE CARE CONSULT AT THE CURRENT TIME.  HE IS CURRENTLY COMFORTABLE AND CHEST PAIN-FREE.  CONTINUE MEDICAL MANAGEMENT.  IF DEVELOPS LIFESTYLE LIMITING ANGINA, THEN DISCUSSION OF HIGH-RISK  PCI WILL BE HELD.    January 12th:  Continues to be chest pain-free.  Continue medical management    January 13th: Continues to be chest pain-free.  Continue medical management.    PEG (percutaneous endoscopic gastrostomy) status        Tracheostomy present        Severe protein-calorie malnutrition          Other hyperlipidemia        Coronary artery disease involving native coronary artery of native heart without angina pectoris  ALTHOUGH PATIENT'S TROPONINS ELEVATED, HE IS CHEST PAIN-FREE.  STATES THAT PRIOR TO HIS STENTS IN MARCH HE HAD CHEST PAINS AND CURRENTLY IS CHEST PAIN-FREE.  IT IS POSSIBLE THAT HIS TROPONIN COULD BE ELEVATED SECONDARY TO HIS HYPOXEMIA ON PRESENTATION.  HOWEVER DOES HAVE LATERAL ST DEPRESSIONS AND ISCHEMIC LOOKING EKG.    1/12 has continued to be chest pain-free.    Peripheral vascular disease              VTE Risk Mitigation (From admission, onward)           Ordered     heparin 25,000 units in dextrose 5% 250 mL (100 units/mL) infusion LOW INTENSITY nomogram - [facility]  Continuous        Question:  Begin at (units/kg/hr)  Answer:  12    01/13/24 0819     IP VTE LOW RISK PATIENT  Once         01/11/24 1648     heparin 25,000 units in dextrose 5% (100 units/ml) IV bolus from bag - ADDITIONAL PRN BOLUS - 60 units/kg (max bolus 4000 units)  As needed (PRN)        Question:  Heparin Infusion Adjustment (DO NOT MODIFY ANSWER)  Answer:  [email or web address]\Images\Pharmacy\HeparinInfusions\heparin LOW INTENSITY nomogram for [facility] [id number]    01/11/24 0823     heparin 25,000 units in dextrose 5% (100 units/ml) IV bolus from bag - ADDITIONAL PRN BOLUS - 30 units/kg (max bolus 4000 units)  As needed (PRN)        Question:  Heparin Infusion Adjustment (DO NOT MODIFY ANSWER)  Answer:  [email or web address]\Pharmacy\HeparinInfusions\heparin LOW INTENSITY nomogram for [facility] [id number]    01/11/24 0823     Place sequential compression device  Until discontinued         01/11/24 7534                     Arturo Malone MD  Cardiology  [facility] [place] [facility] [facility]    Critical Care Time:  35 minutes     Critical care was time spent personally by me on the following activities: development of treatment plan with patient or surrogate and bedside caregivers, discussions with consultants, evaluation of patient's response to treatment, examination of patient, ordering and performing treatments and interventions, ordering and review of laboratory studies, ordering and review of radiographic studies, pulse oximetry, re-evaluation of patient's condition. This critical care time did not overlap with that of any other provider or involve time for any procedures.

## 2024-01-16 NOTE — ASSESSMENT & PLAN NOTE
Patient's anemia is currently controlled. Has not received any PRBCs to date. Etiology likely d/t  chronic disease  Current CBC reviewed-   Lab Results   Component Value Date    HGB 9.9 (L) 01/16/2024    HCT 32.4 (L) 01/16/2024     Monitor serial CBC and transfuse if patient becomes hemodynamically unstable, symptomatic or H/H drops below 7/21.

## 2024-01-16 NOTE — SUBJECTIVE & OBJECTIVE
Past Medical History:   Diagnosis Date    Cancer     skin to Rt forearm and face    COPD (chronic obstructive pulmonary disease)     Hyperlipidemia     Hypertension     Pseudoaneurysm        Past Surgical History:   Procedure Laterality Date    ABDOMINAL SURGERY      stents placed in liver and large intestines, per patient    ANGIOGRAPHY OF AORTIC ARCH  3/27/2023    Procedure: Aortogram, Aortic Arch;  Surgeon: Tim Bhatt MD;  Location: [facility] CATH LAB;  Service: Cardiology;;    AORTOGRAPHY WITH SERIALOGRAPHY N/A 3/27/2023    Procedure: AORTOGRAM, WITH SERIALOGRAPHY;  Surgeon: Tim Bhatt MD;  Location: [facility] CATH LAB;  Service: Cardiology;  Laterality: N/A;    CAROTID STENT      COLONOSCOPY N/A 09/27/2022    Procedure: COLONOSCOPY;  Surgeon: Donnie Peterson MD;  [facility]  Service: Endoscopy;  Laterality: N/A;    COLONOSCOPY N/A 09/30/2022    Procedure: COLONOSCOPY;  Surgeon: Joy Cabrera MD;  [facility]  Service: Endoscopy;  Laterality: N/A;    CORONARY STENT PLACEMENT  01/2000    DISSECTION OF NECK Bilateral 01/04/2022    Procedure: DISSECTION, NECK;  Surgeon: Naeem Smalls MD;  [facility];  Service: ENT;  Laterality: Bilateral;    ESOPHAGOGASTRODUODENOSCOPY N/A 09/27/2022    Procedure: EGD (ESOPHAGOGASTRODUODENOSCOPY);  Surgeon: Donnie Peterson MD;  [facility]  Service: Endoscopy;  Laterality: N/A;    ESOPHAGOGASTRODUODENOSCOPY N/A 09/30/2022    Procedure: EGD (ESOPHAGOGASTRODUODENOSCOPY);  Surgeon: Joy Cabrera MD;  Location: [facility] ENDO (2ND FLR);  Service: Endoscopy;  Laterality: N/A;    ESOPHAGOGASTRODUODENOSCOPY W/ PEG N/A 01/04/2022    Procedure: EGD, WITH PEG TUBE INSERTION;  Surgeon: Anthony Calabrese MD;  [facility];  Service: General;  Laterality: N/A;    EYE SURGERY      Cataract bilateral    femoral stents      bilateral    FLAP PROCEDURE N/A 01/03/2022    Procedure: CREATION, FREE FLAP;   Surgeon: Naeem Smalls MD;  [facility];  Service: ENT;  Laterality: N/A;    FLAP PROCEDURE Right 01/04/2022    Procedure: CREATION, FREE FLAP;  Surgeon: Stacey Conde MD;  Location: [facility] OR [facility] FLR;  Service: ENT;  Laterality: Right;  Ischemic start 1203  Ischemic stop 1353    GLOSSECTOMY N/A 01/04/2022    Procedure: GLOSSECTOMY;  Surgeon: Naeem Smalls MD;  [facility];  Service: ENT;  Laterality: N/A;    LEFT HEART CATHETERIZATION Left 3/23/2023    Procedure: Left heart cath;  Surgeon: Tacos Benitez MD;  Location: [facility] CATH LAB;  Service: Cardiology;  Laterality: Left;    PERCUTANEOUS TRANSLUMINAL BALLOON ANGIOPLASTY OF CORONARY ARTERY Left 3/27/2023    Procedure: Angioplasty-coronary;  Surgeon: Tim Bhatt MD;  Location: [facility] CATH LAB;  Service: Cardiology;  Laterality: Left;  not before 9am, R rad access, 6 Fr EBU 3.5 guide    RADICAL NECK DISSECTION Left 01/03/2022    Procedure: DISSECTION, NECK, RADICAL;  Surgeon: Naeem Smalls MD;  [facility];  Service: ENT;  Laterality: Left;    SKIN BIOPSY      SKIN CANCER EXCISION      STENT, CORONARY, MULTI VESSEL  3/27/2023    Procedure: Stent, Coronary, Multi Vessel;  Surgeon: Tim Bhatt MD;  Location: [facility] CATH LAB;  Service: Cardiology;;    TRACHEOTOMY N/A 01/04/2022    Procedure: TRACHEOTOMY;  Surgeon: Naeem Smalls MD;  [facility];  Service: ENT;  Laterality: N/A;    VASCULAR SURGERY         Review of patient's allergies indicates:   Allergen Reactions    Ativan [lorazepam] Anxiety       Medications:  Medications Prior to Admission   Medication Sig    albuterol-ipratropium (DUO-NEB) 2.5 mg-0.5 mg/3 mL nebulizer solution Take 3 mLs by nebulization every 4 (four) hours as needed for Wheezing. Rescue    ascorbic acid, vitamin C, (VITAMIN C) 100 MG tablet Take 100 mg by mouth once daily.    aspirin 81 MG Chew Take 1 tablet (81 mg total) by mouth once daily.     atorvastatin (LIPITOR) 40 MG tablet 1 tablet (40 mg total) by Per G Tube route every evening. (Patient taking differently: 40 mg by Per G Tube route once daily.)    bisacodyL (DULCOLAX) 10 mg Supp Place 1 suppository (10 mg total) rectally daily as needed.    clopidogreL (PLAVIX) 75 mg tablet Take 1 tablet (75 mg total) by mouth once daily. (Patient taking differently: Take 75 mg by mouth nightly.)    ferrous sulfate 325 (65 FE) MG EC tablet Take 325 mg by mouth 3 (three) times daily with meals.    FLUoxetine (PROZAC) 20 mg/5 mL (4 mg/mL) solution Take 20 mg by mouth once daily.    folic acid (FOLVITE) 1 MG tablet Take 1 mg by mouth once daily.    furosemide (LASIX) 40 MG tablet Take 0.5 tablets (20 mg total) by mouth once daily. (Patient taking differently: Take 20 mg by mouth nightly.)    glycopyrrolate (CUVPOSA) 1 mg/5 mL (0.2 mg/mL) Soln Take 5 mLs (1 mg total) by mouth 3 (three) times daily as needed (TO REDUCE SECRETIONS).    hydrOXYzine HCL (ATARAX) 25 MG tablet SMARTSI.5 Tablet(s) Gastro Tube Every 8 Hours PRN    melatonin (MELATIN) 3 mg tablet 1 tablet (3 mg total) by Per G Tube route nightly.    metoprolol tartrate (LOPRESSOR) 25 MG tablet Take 0.5 tablets (12.5 mg total) by mouth 2 (two) times daily.    omeprazole (PRILOSEC) 40 MG capsule Take 40 mg (contents of one capsule) twice daily through PEG tube. Mix contents of capsule with 10 mL applesauce. (Patient taking differently: 40 mg 2 (two) times a day. Take 40 mg (contents of one capsule) twice daily through PEG tube. Mix contents of capsule with 10 mL applesauce.)    polyethylene glycol (GLYCOLAX) 17 gram PwPk 17 g by Per G Tube route 2 (two) times daily.    sodium chloride 3% 3 % nebulizer solution Take 4 mLs by nebulization 2 (two) times a day.    thiamine (VITAMIN B-1) 50 MG tablet Take 50 mg by mouth once daily.    WIXELA INHUB 250-50 mcg/dose diskus inhaler SMARTSI Puff(s) By Mouth Every 12 Hours     Antibiotics (From admission, onward)       Start     Stop Route Frequency Ordered    24 1207  piperacillin-tazobactam (ZOSYN) 4.5 g in dextrose 5 % in water (D5W) 100 mL IVPB (MB+)         -- IV Every 8 hours (non-standard times) 24 1207    24 0930  mupirocin 2 % ointment         24 0859 Nasl 2 times daily 24 0828          Antifungals (From admission, onward)      None          Antivirals (From admission, onward)      None             Immunization History   Administered Date(s) Administered    COVID-19, MRNA, LN-S, PF (MODERNA FULL 0.5 ML DOSE) 2021, 2021, 2021       Family History    None       Social History     Socioeconomic History    Marital status:    Tobacco Use    Smoking status: Former     Current packs/day: 0.00     Average packs/day: 2.0 packs/day for 55.0 years (110.0 ttl pk-yrs)     Types: Cigarettes     Start date: 1963     Quit date: 2018     Years since quittin.7    Smokeless tobacco: Never    Tobacco comments:     3/3 ppd x 40 yrs. Currently 3-4 cigarettes daily .He is trying  to quit. Is using a Vapor cigarettes  2-3 x's a day.   Substance and Sexual Activity    Alcohol use: Not Currently    Drug use: No    Sexual activity: Not Currently     Social Determinants of Health     Financial Resource Strain: Low Risk  (2023)    Overall Financial Resource Strain (CARDIA)     Difficulty of Paying Living Expenses: Not very hard   Food Insecurity: No Food Insecurity (2023)    Hunger Vital Sign     Worried About Running Out of Food in the Last Year: Never true     Ran Out of Food in the Last Year: Never true   Transportation Needs: No Transportation Needs (2023)    PRAPARE - Transportation     Lack of Transportation (Medical): No     Lack of Transportation (Non-Medical): No   Physical Activity: Inactive (2023)    Exercise Vital Sign     Days of Exercise per Week: 0 days     Minutes of Exercise per Session: 0 min   Social Connections: Moderately Isolated  (11/21/2023)    Social Connection and Isolation Panel [NHANES]     Frequency of Communication with Friends and Family: Three times a week     Frequency of Social Gatherings with Friends and Family: Three times a week     Attends [employer] Services: Never     Active Member of Clubs or Organizations: No     Attends Club or Organization Meetings: Never     Marital Status:    Housing Stability: Low Risk  (11/21/2023)    Housing Stability Vital Sign     Unable to Pay for Housing in the Last Year: No     Number of Places Lived in the Last Year: 1     Unstable Housing in the Last Year: No     Review of Systems   Constitutional:  Positive for fatigue. Negative for fever.   Eyes: Negative.    Respiratory:  Positive for shortness of breath.    Cardiovascular: Negative.    Gastrointestinal: Negative.    Endocrine: Negative.    Genitourinary: Negative.    Musculoskeletal: Negative.    Skin: Negative.    Allergic/Immunologic: Negative.    Neurological: Negative.    Hematological: Negative.    Psychiatric/Behavioral: Negative.       Objective:     Vital Signs (Most Recent):  Temp: 97.8 °F (36.6 °C) (01/16/24 1437)  Pulse: 90 (01/16/24 1400)  Resp: (!) 26 (01/16/24 1400)  BP: 117/71 (01/16/24 1400)  SpO2: 97 % (01/16/24 1400) Vital Signs (24h Range):  Temp:  [97.7 °F (36.5 °C)-97.8 °F (36.6 °C)] 97.8 °F (36.6 °C)  Pulse:  [] 90  Resp:  [18-38] 26  SpO2:  [90 %-100 %] 97 %  BP: ()/(58-83) 117/71     Weight: 58.6 kg (129 lb 3 oz)  Body mass index is 19.08 kg/m².    Estimated Creatinine Clearance: 67.1 mL/min (based on SCr of 0.8 mg/dL).     Physical Exam  Vitals and nursing note reviewed.   Constitutional:       General: He is not in acute distress.     Appearance: He is ill-appearing. He is not toxic-appearing.   HENT:      Head: Normocephalic.   Neck:      Comments: Trach collar  Cardiovascular:      Rate and Rhythm: Normal rate.      Heart sounds: No murmur heard.  Pulmonary:      Effort: Pulmonary effort is  normal.   Abdominal:      Palpations: Abdomen is soft.      Comments: PEG tube   Musculoskeletal:         General: No swelling or tenderness.   Skin:     General: Skin is warm and dry.          Significant Labs:   Microbiology Results (last 7 days)       Procedure Component Value Units Date/Time    Blood Culture #1 **CANNOT BE ORDERED STAT** [[id number]] Collected: 01/11/24 0116    Order Status: Completed Specimen: Blood from Peripheral, Forearm, Right Updated: 01/15/24 0303     Blood Culture, Routine No Growth after 4 days.    Blood culture [[id number]] Collected: 01/11/24 0144    Order Status: Completed Specimen: Blood from Peripheral, Lower Arm, Left Updated: 01/15/24 0303     Blood Culture, Routine No Growth after 4 days.    Culture, Respiratory with Gram Stain [[id number]]  (Abnormal)  (Susceptibility) Collected: 01/11/24 0053    Order Status: Completed Specimen: Sputum Updated: 01/14/24 0631     Respiratory Culture PSEUDOMONAS AERUGINOSA  Many        ACHROMOBACTER XYLOSOXIDANS SUBSP. DENTRIFICANS  Few       Gram Stain (Respiratory) <10 epithelial cells per low power field.     Gram Stain (Respiratory) Moderate WBC's     Gram Stain (Respiratory) Moderate Gram negative rods    Culture, Respiratory with Gram Stain [[id number]]  (Abnormal) Collected: 01/11/24 0729    Order Status: Completed Specimen: Respiratory from Tracheal Aspirate Updated: 01/13/24 0833     Respiratory Culture PRESUMPTIVE PSEUDOMONAS SPECIES  Many  For susceptibility see order # [id number]       Gram Stain (Respiratory) Moderate WBC's     Gram Stain (Respiratory) Moderate Gram negative rods     Gram Stain (Respiratory) Few Gram positive cocci in pairs     Gram Stain (Respiratory) Rare Gram positive rods            Significant Imaging: I have reviewed all pertinent imaging results/findings within the past 24 hours.

## 2024-01-16 NOTE — ASSESSMENT & PLAN NOTE
Nutrition consulted. Most recent weight and BMI monitored-     Measurements:  Wt Readings from Last 1 Encounters:   01/16/24 58.6 kg (129 lb 3 oz)   Body mass index is 19.08 kg/m².    Patient has been screened and assessed by RD.    Interventions/Recommendations (treatment strategy):     - continue tube feeds

## 2024-01-16 NOTE — HPI
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 He is on 6L trach collar at home, had  associated fever, and increased trach output. He feels improved today from admission. Denies any pain anywhere else. Denies abdominal pain, no sacral wound.

## 2024-01-16 NOTE — ASSESSMENT & PLAN NOTE
christopher Richard Jr. 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 He has history of multiple ventilator associated pneumonias given trach. Culture with pseudomonas aeruginosa and achromobacter.  - complete 7 day course of pip-tazo 4 gram q8 hours, end date 1/17/24

## 2024-01-17 LAB
ALBUMIN SERPL BCP-MCNC: 2.6 G/DL (ref 3.5–5.2)
ALP SERPL-CCNC: 80 U/L (ref 55–135)
ALT SERPL W/O P-5'-P-CCNC: 18 U/L (ref 10–44)
ANION GAP SERPL CALC-SCNC: 12 MMOL/L (ref 8–16)
APTT PPP: 26.5 SEC (ref 21–32)
AST SERPL-CCNC: 19 U/L (ref 10–40)
BASOPHILS # BLD AUTO: 0.04 K/UL (ref 0–0.2)
BASOPHILS NFR BLD: 0.4 % (ref 0–1.9)
BILIRUB SERPL-MCNC: 0.4 MG/DL (ref 0.1–1)
BUN SERPL-MCNC: 20 MG/DL (ref 8–23)
CALCIUM SERPL-MCNC: 9.7 MG/DL (ref 8.7–10.5)
CHLORIDE SERPL-SCNC: 97 MMOL/L (ref 95–110)
CO2 SERPL-SCNC: 31 MMOL/L (ref 23–29)
CREAT SERPL-MCNC: 1 MG/DL (ref 0.5–1.4)
DIFFERENTIAL METHOD BLD: ABNORMAL
EOSINOPHIL # BLD AUTO: 0.3 K/UL (ref 0–0.5)
EOSINOPHIL NFR BLD: 3.3 % (ref 0–8)
ERYTHROCYTE [DISTWIDTH] IN BLOOD BY AUTOMATED COUNT: 14.4 % (ref 11.5–14.5)
EST. GFR  (NO RACE VARIABLE): >60 ML/MIN/1.73 M^2
GLUCOSE SERPL-MCNC: 154 MG/DL (ref 70–110)
HCT VFR BLD AUTO: 32 % (ref 40–54)
HGB BLD-MCNC: 9.6 G/DL (ref 14–18)
IMM GRANULOCYTES # BLD AUTO: 0.03 K/UL (ref 0–0.04)
IMM GRANULOCYTES NFR BLD AUTO: 0.3 % (ref 0–0.5)
LYMPHOCYTES # BLD AUTO: 0.1 K/UL (ref 1–4.8)
LYMPHOCYTES NFR BLD: 1.3 % (ref 18–48)
MAGNESIUM SERPL-MCNC: 1.9 MG/DL (ref 1.6–2.6)
MCH RBC QN AUTO: 28.4 PG (ref 27–31)
MCHC RBC AUTO-ENTMCNC: 30 G/DL (ref 32–36)
MCV RBC AUTO: 95 FL (ref 82–98)
MONOCYTES # BLD AUTO: 0.5 K/UL (ref 0.3–1)
MONOCYTES NFR BLD: 5.1 % (ref 4–15)
NEUTROPHILS # BLD AUTO: 8.1 K/UL (ref 1.8–7.7)
NEUTROPHILS NFR BLD: 89.6 % (ref 38–73)
NRBC BLD-RTO: 0 /100 WBC
PLATELET # BLD AUTO: 206 K/UL (ref 150–450)
PMV BLD AUTO: 9.8 FL (ref 9.2–12.9)
POCT GLUCOSE: 102 MG/DL (ref 70–110)
POCT GLUCOSE: 140 MG/DL (ref 70–110)
POCT GLUCOSE: 144 MG/DL (ref 70–110)
POTASSIUM SERPL-SCNC: 3.5 MMOL/L (ref 3.5–5.1)
PROT SERPL-MCNC: 6.7 G/DL (ref 6–8.4)
RBC # BLD AUTO: 3.38 M/UL (ref 4.6–6.2)
SODIUM SERPL-SCNC: 140 MMOL/L (ref 136–145)
WBC # BLD AUTO: 9.09 K/UL (ref 3.9–12.7)

## 2024-01-17 PROCEDURE — 27000221 HC OXYGEN, UP TO 24 HOURS

## 2024-01-17 PROCEDURE — 63600175 PHARM REV CODE 636 W HCPCS: Performed by: STUDENT IN AN ORGANIZED HEALTH CARE EDUCATION/TRAINING PROGRAM

## 2024-01-17 PROCEDURE — 85025 COMPLETE CBC W/AUTO DIFF WBC: CPT | Performed by: HOSPITALIST

## 2024-01-17 PROCEDURE — 36415 COLL VENOUS BLD VENIPUNCTURE: CPT | Performed by: HOSPITALIST

## 2024-01-17 PROCEDURE — 25000003 PHARM REV CODE 250: Performed by: STUDENT IN AN ORGANIZED HEALTH CARE EDUCATION/TRAINING PROGRAM

## 2024-01-17 PROCEDURE — 25000003 PHARM REV CODE 250: Performed by: HOSPITALIST

## 2024-01-17 PROCEDURE — 94640 AIRWAY INHALATION TREATMENT: CPT

## 2024-01-17 PROCEDURE — 99900026 HC AIRWAY MAINTENANCE (STAT)

## 2024-01-17 PROCEDURE — 21400001 HC TELEMETRY ROOM

## 2024-01-17 PROCEDURE — 85730 THROMBOPLASTIN TIME PARTIAL: CPT | Performed by: HOSPITALIST

## 2024-01-17 PROCEDURE — 25000242 PHARM REV CODE 250 ALT 637 W/ HCPCS: Performed by: STUDENT IN AN ORGANIZED HEALTH CARE EDUCATION/TRAINING PROGRAM

## 2024-01-17 PROCEDURE — 83735 ASSAY OF MAGNESIUM: CPT | Performed by: HOSPITALIST

## 2024-01-17 PROCEDURE — 99900035 HC TECH TIME PER 15 MIN (STAT)

## 2024-01-17 PROCEDURE — 80053 COMPREHEN METABOLIC PANEL: CPT | Performed by: HOSPITALIST

## 2024-01-17 PROCEDURE — 99291 CRITICAL CARE FIRST HOUR: CPT | Mod: ,,, | Performed by: INTERNAL MEDICINE

## 2024-01-17 PROCEDURE — 11000001 HC ACUTE MED/SURG PRIVATE ROOM

## 2024-01-17 RX ADMIN — CLOPIDOGREL BISULFATE 75 MG: 75 TABLET ORAL at 08:01

## 2024-01-17 RX ADMIN — FLUOXETINE HYDROCHLORIDE 20 MG: 20 SOLUTION ORAL at 09:01

## 2024-01-17 RX ADMIN — PIPERACILLIN AND TAZOBACTAM 4.5 G: 4; .5 INJECTION, POWDER, LYOPHILIZED, FOR SOLUTION INTRAVENOUS; PARENTERAL at 08:01

## 2024-01-17 RX ADMIN — IPRATROPIUM BROMIDE AND ALBUTEROL SULFATE 3 ML: 2.5; .5 SOLUTION RESPIRATORY (INHALATION) at 07:01

## 2024-01-17 RX ADMIN — MELATONIN TAB 3 MG 6 MG: 3 TAB at 08:01

## 2024-01-17 RX ADMIN — FOLIC ACID 1 MG: 1 TABLET ORAL at 09:01

## 2024-01-17 RX ADMIN — IPRATROPIUM BROMIDE AND ALBUTEROL SULFATE 3 ML: 2.5; .5 SOLUTION RESPIRATORY (INHALATION) at 04:01

## 2024-01-17 RX ADMIN — FUROSEMIDE 20 MG: 20 TABLET ORAL at 09:01

## 2024-01-17 RX ADMIN — IPRATROPIUM BROMIDE AND ALBUTEROL SULFATE 3 ML: 2.5; .5 SOLUTION RESPIRATORY (INHALATION) at 12:01

## 2024-01-17 RX ADMIN — SODIUM CHLORIDE SOLN NEBU 3% 4 ML: 3 NEBU SOLN at 07:01

## 2024-01-17 RX ADMIN — ATORVASTATIN CALCIUM 40 MG: 40 TABLET, FILM COATED ORAL at 09:01

## 2024-01-17 RX ADMIN — IPRATROPIUM BROMIDE AND ALBUTEROL SULFATE 3 ML: 2.5; .5 SOLUTION RESPIRATORY (INHALATION) at 08:01

## 2024-01-17 RX ADMIN — ASPIRIN 81 MG CHEWABLE TABLET 81 MG: 81 TABLET CHEWABLE at 09:01

## 2024-01-17 RX ADMIN — ACETAMINOPHEN 650 MG: 325 TABLET ORAL at 12:01

## 2024-01-17 RX ADMIN — SODIUM CHLORIDE SOLN NEBU 3% 4 ML: 3 NEBU SOLN at 08:01

## 2024-01-17 RX ADMIN — HYDROXYZINE HYDROCHLORIDE 25 MG: 25 TABLET ORAL at 08:01

## 2024-01-17 RX ADMIN — PIPERACILLIN AND TAZOBACTAM 4.5 G: 4; .5 INJECTION, POWDER, LYOPHILIZED, FOR SOLUTION INTRAVENOUS; PARENTERAL at 11:01

## 2024-01-17 RX ADMIN — PIPERACILLIN AND TAZOBACTAM 4.5 G: 4; .5 INJECTION, POWDER, LYOPHILIZED, FOR SOLUTION INTRAVENOUS; PARENTERAL at 04:01

## 2024-01-17 NOTE — PLAN OF CARE
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 VSS , free of fall injury and skin breakdown in a shift. POC reviewed with patient and family expressed understanding.  Problem: Adult Inpatient Plan of Care  Goal: Plan of Care Review  Outcome: Ongoing, Progressing  Goal: Patient-Specific Goal (Individualized)  Outcome: Ongoing, Progressing  Goal: Absence of Hospital-Acquired Illness or Injury  Outcome: Ongoing, Progressing  Goal: Optimal Comfort and Wellbeing  Outcome: Ongoing, Progressing  Goal: Readiness for Transition of Care  Outcome: Ongoing, Progressing     Problem: Skin Injury Risk Increased  Goal: Skin Health and Integrity  Outcome: Ongoing, Progressing     Problem: Fall Injury Risk  Goal: Absence of Fall and Fall-Related Injury  Outcome: Ongoing, Progressing     Problem: Fluid Imbalance (Pneumonia)  Goal: Fluid Balance  Outcome: Ongoing, Progressing     Problem: Infection (Pneumonia)  Goal: Resolution of Infection Signs and Symptoms  Outcome: Ongoing, Progressing     Problem: Respiratory Compromise (Pneumonia)  Goal: Effective Oxygenation and Ventilation  Outcome: Ongoing, Progressing     Problem: Adjustment to Illness (Acute Coronary Syndrome)  Goal: Optimal Adaptation to Illness  Outcome: Ongoing, Progressing     Problem: Dysrhythmia (Acute Coronary Syndrome)  Goal: Normalized Cardiac Rhythm  Outcome: Ongoing, Progressing     Problem: Cardiac-Related Pain (Acute Coronary Syndrome)  Goal: Absence of Cardiac-Related Pain  Outcome: Ongoing, Progressing     Problem: Hemodynamic Instability (Acute Coronary Syndrome)  Goal: Effective Cardiac Pump Function  Outcome: Ongoing, Progressing     Problem: Tissue Perfusion (Acute Coronary Syndrome)  Goal: Adequate Tissue Perfusion  Outcome: Ongoing, Progressing     Problem: Arrhythmia/Dysrhythmia (Cardiac Catheterization)  Goal: Stable Heart Rate and Rhythm  Outcome: Ongoing, Progressing     Problem: Bleeding (Cardiac  Catheterization)  Goal: Absence of Bleeding  Outcome: Ongoing, Progressing     Problem: Contrast-Induced Injury Risk (Cardiac Catheterization)  Goal: Absence of Contrast-Induced Injury  Outcome: Ongoing, Progressing     Problem: Embolism (Cardiac Catheterization)  Goal: Absence of Embolism Signs and Symptoms  Outcome: Ongoing, Progressing     Problem: Ongoing Anesthesia/Sedation Effects (Cardiac Catheterization)  Goal: Anesthesia/Sedation Recovery  Outcome: Ongoing, Progressing     Problem: Pain (Cardiac Catheterization)  Goal: Acceptable Pain Control  Outcome: Ongoing, Progressing     Problem: Vascular Access Protection (Cardiac Catheterization)  Goal: Absence of Vascular Access Complication  Outcome: Ongoing, Progressing

## 2024-01-17 NOTE — NURSING
[facility]  Nurses Note -- 4 Eyes      1/17/2024       Skin assessed on: Q Shift      [x] No Pressure Injuries Present    []Prevention Measures Documented    [] Yes LDA  for Pressure Injury Previously documented     [] Yes New Pressure Injury Discovered   [] LDA for New Pressure Injury Added      Attending RN:  America NAILS RN     Second RN:  KAMILAH Cooney

## 2024-01-17 NOTE — SUBJECTIVE & OBJECTIVE
Interval History: feels ok. Still with a lot of secretions.     Review of Systems  Objective:     Vital Signs (Most Recent):  Temp: 99 °F (37.2 °C) (01/17/24 0701)  Pulse: 101 (01/17/24 1000)  Resp: (!) 28 (01/17/24 1000)  BP: 114/72 (01/17/24 1000)  SpO2: 100 % (01/17/24 1000) Vital Signs (24h Range):  Temp:  [97.7 °F (36.5 °C)-99 °F (37.2 °C)] 99 °F (37.2 °C)  Pulse:  [] 101  Resp:  [18-37] 28  SpO2:  [90 %-100 %] 100 %  BP: ()/(55-83) 114/72     Weight: 57.5 kg (126 lb 12.2 oz)  Body mass index is 18.72 kg/m².    Intake/Output Summary (Last 24 hours) at 1/17/2024 1028  Last data filed at 1/17/2024 0900  Gross per 24 hour   Intake 884.07 ml   Output 1450 ml   Net -565.93 ml           Physical Exam  Vitals reviewed.   Constitutional:       General: He is not in acute distress.     Appearance: He is ill-appearing.   HENT:      Head: Normocephalic and atraumatic.      Mouth/Throat:      Mouth: Mucous membranes are dry.   Eyes:      General: No scleral icterus.     Extraocular Movements: Extraocular movements intact.   Cardiovascular:      Rate and Rhythm: Normal rate and regular rhythm.   Pulmonary:      Effort: Pulmonary effort is normal. Respiratory distress: course respirations.   Abdominal:      Palpations: Abdomen is soft.      Tenderness: There is no abdominal tenderness.   Musculoskeletal:         General: No swelling or tenderness.      Cervical back: Neck supple. No rigidity.   Skin:     General: Skin is warm and dry.   Neurological:      General: No focal deficit present.      Mental Status: He is alert and oriented to person, place, and time.   Psychiatric:         Mood and Affect: Mood normal.         Behavior: Behavior normal.             Significant Labs: All pertinent labs within the past 24 hours have been reviewed.    Significant Imaging: I have reviewed all pertinent imaging results/findings within the past 24 hours.

## 2024-01-17 NOTE — NURSING
[facility]  Nurses Note -- 4 Eyes      1/16/2024       Skin assessed on: Q Shift      [x] No Pressure Injuries Present    [x]Prevention Measures Documented    [] Yes LDA  for Pressure Injury Previously documented     [] Yes New Pressure Injury Discovered   [] LDA for New Pressure Injury Added      Attending RN:  Ivet Blackburn RN     Second RN:  Soco Pulido RN

## 2024-01-17 NOTE — ASSESSMENT & PLAN NOTE
Admitted with NSTEMI. No chest pain.  Recent Labs   Lab 01/11/24  0855   TROPONINI 3.344*       EKG with ischemic changes   He has know CAD  Started asa, plavix, heparin gtt, statin.   TTE EF 50-55%    Medical treat ment for NSTEMI per cardiology.

## 2024-01-17 NOTE — PLAN OF CARE
Problem: Skin Injury Risk Increased  Goal: Skin Health and Integrity  Outcome: Ongoing, Progressing     Problem: Fall Injury Risk  Goal: Absence of Fall and Fall-Related Injury  Outcome: Ongoing, Progressing     Problem: Fluid Imbalance (Pneumonia)  Goal: Fluid Balance  Outcome: Ongoing, Progressing     Problem: Infection (Pneumonia)  Goal: Resolution of Infection Signs and Symptoms  Outcome: Ongoing, Progressing     Problem: Respiratory Compromise (Pneumonia)  Goal: Effective Oxygenation and Ventilation  Outcome: Ongoing, Progressing

## 2024-01-17 NOTE — PROGRESS NOTES
[facility]  [facility] Medicine  Progress Note    Patient Name: Fareed Richard Jr.  MRN: [id number]  Patient Class: IP- Inpatient   Admission Date: 1/11/2024  Length of Stay: 6 days  Attending Physician: Santos Marlow, *  Primary Care Provider: Kodi Tubbs MD        Subjective:     Principal Problem:Acute on chronic respiratory failure with hypoxia        HPI:  Mr Fareed Richard Jr. 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 He is on 6L trach collar at home and receives tube feeding. He had shortness of breath, fever, and increased trach output. Denies chest pain.     In ED, he was tachycardic and tachypneic with SpO2 85% on 8L. He was given albuterol, vanc/zosyn, solumedrol. He was admitted to ICU.     Overview/Hospital Course:  Mr Fareed Richard Jr. is a 73 y.o. man who was admitted with acute on chronic hypoxic respiratory failure due to pneumonia, suspect recurrent Pseudomonas. Also with NSTEMI on admit in setting of known CAD. Started vanc, zosyn. Weaned O2. Cardiology consulted- due to severe CAD and complications with last angiogram <1 year ago, will plan medical management with heparin gtt, asa, plavix, and will NOT pursue ischemic evaluation. He is improving. Stepped down to floor. Cultures growing pseudomonas in sputum. On zosyn. Vancomycin stopped. ID consulted,continue with zosyn,on 40% FIO2.  Medical treat ment for NSTEMI per cardiology.      Interval History: feels ok. Still with a lot of secretions.     Review of Systems  Objective:     Vital Signs (Most Recent):  Temp: 99 °F (37.2 °C) (01/17/24 0701)  Pulse: 101 (01/17/24 1000)  Resp: (!) 28 (01/17/24 1000)  BP: 114/72 (01/17/24 1000)  SpO2: 100 % (01/17/24 1000) Vital Signs (24h Range):  Temp:  [97.7 °F (36.5 °C)-99 °F (37.2 °C)] 99 °F (37.2 °C)  Pulse:  [] 101  Resp:   [18-37] 28  SpO2:  [90 %-100 %] 100 %  BP: ()/(55-83) 114/72     Weight: 57.5 kg (126 lb 12.2 oz)  Body mass index is 18.72 kg/m².    Intake/Output Summary (Last 24 hours) at 1/17/2024 1028  Last data filed at 1/17/2024 0900  Gross per 24 hour   Intake 884.07 ml   Output 1450 ml   Net -565.93 ml           Physical Exam  Vitals reviewed.   Constitutional:       General: He is not in acute distress.     Appearance: He is ill-appearing.   HENT:      Head: Normocephalic and atraumatic.      Mouth/Throat:      Mouth: Mucous membranes are dry.   Eyes:      General: No scleral icterus.     Extraocular Movements: Extraocular movements intact.   Cardiovascular:      Rate and Rhythm: Normal rate and regular rhythm.   Pulmonary:      Effort: Pulmonary effort is normal. Respiratory distress: course respirations.   Abdominal:      Palpations: Abdomen is soft.      Tenderness: There is no abdominal tenderness.   Musculoskeletal:         General: No swelling or tenderness.      Cervical back: Neck supple. No rigidity.   Skin:     General: Skin is warm and dry.   Neurological:      General: No focal deficit present.      Mental Status: He is alert and oriented to person, place, and time.   Psychiatric:         Mood and Affect: Mood normal.         Behavior: Behavior normal.             Significant Labs: All pertinent labs within the past 24 hours have been reviewed.    Significant Imaging: I have reviewed all pertinent imaging results/findings within the past 24 hours.    Assessment/Plan:      * Acute on chronic respiratory failure with hypoxia  Patient admitted with Hypoxic which is Acute on Chronic.  he is on home oxygen at 6 LPM trach collar. Signs/symptoms of respiratory failure include- increased work of breathing and respiratory distress present on admission.   - Labs and images were reviewed. Patient Has not has a recent ABG.   - Supplemental oxygen was provided and noted-  trach collar    - Respiratory failure is due  to- CHF and Pneumonia   - CT with RML PNA. Trach aspirate suspected Pseudomonas. Continue vanc, zosyn for now- likely can drop vanc tomorrow   - BNP higher than ever before. TTE normal EF. NSTEMI present. Continue lasix 40mg IV daily- can likely change to PO tomorrow  - CTA no PE  - improving from admit  Consulted ID for Abx management. Continuing with Zosyn  Still on FIO2 at 40%,continue with HFO and frequent suctioning,pulmonology is on case,        Ventilator associated pneumonia  RML infiltrate  Trach aspirate with suspect Pseudomonas  - continue zosyn. ID consulted. Vancomycin stopped        Normocytic anemia  Patient's anemia is currently controlled. Has not received any PRBCs to date. Etiology likely d/t  chronic disease  Current CBC reviewed-   Lab Results   Component Value Date    HGB 9.9 (L) 01/16/2024    HCT 32.4 (L) 01/16/2024     Monitor serial CBC and transfuse if patient becomes hemodynamically unstable, symptomatic or H/H drops below 7/21.    Acute on chronic diastolic heart failure  Patient admitted with shortness of breath. His BNP is higher than ever before despite him appearing euvolemic.      BNP  Recent Labs   Lab 01/11/24  0021   BNP 2,467*       CT RML infiltrate  Recent Labs   Lab 01/11/24  0855   TROPONINI 3.344*       EKG not able to see in MUSE- will need to find. Per Cardiology, no STEMI    Continue 40mg IV lasix QD. Monitor UOP. Likely change to PO lasix tomorrow  TTE normal EF, potential diastolic dysfunction  Cardiology consulted for NSTEMI- no ischemic eval planned         NSTEMI (non-ST elevated myocardial infarction)  Admitted with NSTEMI. No chest pain.  Recent Labs   Lab 01/11/24  0855   TROPONINI 3.344*       EKG with ischemic changes   He has know CAD  Started asa, plavix, heparin gtt, statin.   TTE EF 50-55%    Medical treat ment for NSTEMI per cardiology.        PEG (percutaneous endoscopic gastrostomy) status  Patient noted to have a percutaneous endoscopic gastrostomy tube  in place. I have personally inspected the tube.Tube was placed prior to this admission There are no signs of drainage or infection around the site. The tube is patent. Medications have converted to liquid form if available.  Routine care to be done by wound care and nursing staff.   - resumed tube feeds         Tracheostomy present  Noted      ACP (advance care planning)  Advance Care Planning    Date: 01/11/2024  Full code status          Severe protein-calorie malnutrition  Nutrition consulted. Most recent weight and BMI monitored-     Measurements:  Wt Readings from Last 1 Encounters:   01/16/24 58.6 kg (129 lb 3 oz)   Body mass index is 19.08 kg/m².    Patient has been screened and assessed by RD.    Interventions/Recommendations (treatment strategy):     - continue tube feeds    Secondary malignant neoplasm of cervical lymph node     Cancer Staging   Squamous cell cancer of tongue  Staging form: Oral Cavity, AJCC 8th Edition  - Clinical stage from 1/2/2022: Stage NAFISA (cT2, cN2a, cM0) - Signed by Naeem Smalls MD on 1/2/2022  - Pathologic stage from 1/4/2022: Stage IVB (pT4a, pN3b, cM0) - Signed by Christopher Jay MD on 2/16/2022        COPD exacerbation   On nebulizer     Other hyperlipidemia  Continue statin      Primary hypertension  Chronic, controlled. Latest blood pressure and vitals reviewed-     Temp:  [97.7 °F (36.5 °C)-98.4 °F (36.9 °C)]   Pulse:  []   Resp:  [18-38]   BP: ()/(57-83)   SpO2:  [90 %-100 %] .   Home meds for hypertension were reviewed and noted below.   Hypertension Medications               furosemide (LASIX) 40 MG tablet Take 0.5 tablets (20 mg total) by mouth once daily.    metoprolol tartrate (LOPRESSOR) 25 MG tablet Take 0.5 tablets (12.5 mg total) by mouth 2 (two) times daily.          - hold BP Rx because BP is borderline low     Will utilize p.r.n. blood pressure medication only if patient's blood pressure greater than 180/110 and he develops symptoms such as  worsening chest pain or shortness of breath.    Coronary artery disease involving native coronary artery of native heart without angina pectoris  Patient with known CAD s/p stent placement, which is uncontrolled Will continue ASA, Plavix, and Statin and monitor for S/Sx of angina/ACS. Continue to monitor on telemetry.   - see NSTEMI    Peripheral vascular disease  Known PAD from last angiogram 3/2023  Continue asa, plavix, statin         VTE Risk Mitigation (From admission, onward)           Ordered     heparin 25,000 units in dextrose 5% 250 mL (100 units/mL) infusion LOW INTENSITY nomogram - [facility]  Continuous        Question:  Begin at (units/kg/hr)  Answer:  12    01/13/24 0819     IP VTE LOW RISK PATIENT  Once         01/11/24 1648     Place sequential compression device  Until discontinued         01/11/24 0225                    Discharge Planning   NISA: 1/19/2024     Code Status: Full Code   Is the patient medically ready for discharge?:     Reason for patient still in hospital (select all that apply): Patient trending condition  Discharge Plan A: Home Health            Critical care time spent on the evaluation and treatment of severe organ dysfunction, review of pertinent labs and imaging studies, discussions with consulting providers and discussions with patient/family:  over 30  minutes.      Santos Marlow MD  Department of Hospital Medicine   [facility] - Intensive Care

## 2024-01-17 NOTE — NURSING
[facility] Intensive Care  ICU Shift Summary  Date: 1/17/2024      Prehospitalization: Home  Admit Date / LOS : 1/11/2024/ 6 days    Diagnosis: Acute on chronic respiratory failure with hypoxia    Consults:        Active: Cardio and Pulm CC       Needed: N/A     Code Status: Full Code   Advanced Directive: Not Received    LDA:  Lines/Drains/Airways       Drain  Duration                  Gastrostomy/Enterostomy 01/04/22 Percutaneous endoscopic gastrostomy (PEG)  days    Male External Urinary Catheter 01/13/24 1800 3 days              Airway  Duration             Adult Surgical Airway 03/16/23 1014 Shiley Cuffed 6.0 306 days              Peripheral Intravenous Line  Duration                  Peripheral IV - Single Lumen 01/11/24 0013 20 G Right Antecubital 6 days         Peripheral IV - Single Lumen 01/15/24 1810 Anterior;Left;Proximal Forearm 1 day                  Central Lines/Site/Justification:Patient Does Not Have Central Line  Urinary Cath/Order/Justification:Patient Does Not Have Urinary Catheter    Vasopressors/Infusions:        GOALS: Volume/ Hemodynamic: N/A                     RASS: 0  alert and calm    Pain Management: none       Pain Controlled: not applicable     Rhythm: NSR    Respiratory Device: Trach collar high flow   Oxygen Concentration (%):  [40-98] 60                 Most Recent SBT/ SAT: N/A       MOVE Screen: PASS  ICU Liberation: yes    VTE Prophylaxis: Mechanical  Mobility: Bedrest  Stress Ulcer Prophylaxis: Yes    Isolation: No active isolations    Dietary:   Current Diet Order   Procedures    Diet NPO Except for: Sips with Medication     Order Specific Question:   Except for     Answer:   Sips with Medication      Tolerance: yes  Advancement: yes    I & O (24h):    Intake/Output Summary (Last 24 hours) at 1/17/2024 0700  Last data filed at 1/17/2024 0656  Gross per 24 hour   Intake 1019.1 ml   Output 1450 ml   Net -430.9 ml        Restraints: No    Significant Dates:  Post Op Date:  "N/A  Rescue Date: N/A  Imaging/ Diagnostics: N/A    Noteworthy Labs:  Mg 1.9, K 3.5    COVID Test: (--)  CBC/Anemia Labs: Coags:    Recent Labs   Lab 01/16/24 0445 01/17/24  0358   WBC 6.50 9.09   HGB 9.9* 9.6*   HCT 32.4* 32.0*    206   MCV 95 95   RDW 14.7* 14.4    Recent Labs   Lab 01/11/24  0855 01/11/24  1557 01/16/24 0445 01/17/24  0358   INR 1.1  --   --   --    APTT 26.3   < > 26.6 26.5    < > = values in this interval not displayed.        Chemistries:   Recent Labs   Lab 01/12/24  0635 01/13/24  0241 01/16/24 0445 01/17/24  0358   *   < > 140 140   K 3.6   < > 4.0 3.5   CL 94*   < > 97 97   CO2 31*   < > 33* 31*   BUN 25*   < > 20 20   CREATININE 0.9   < > 0.8 1.0   CALCIUM 8.9   < > 9.8 9.7   PROT 6.6   < > 6.7 6.7   BILITOT 0.5   < > 0.5 0.4   ALKPHOS 76   < > 78 80   ALT 29   < > 19 18   AST 55*   < > 18 19   MG 2.1   < > 1.9 1.9   PHOS 3.0  --   --   --     < > = values in this interval not displayed.        Cardiac Enzymes: Ejection Fractions:    No results for input(s): "CPK", "CPKMB", "MB", "TROPONINI" in the last 72 hours. EF   Date Value Ref Range Status   03/20/2023 60 % Final        POCT Glucose: HbA1c:    Recent Labs   Lab 01/16/24  1154 01/16/24  1749 01/16/24  2351   POCTGLUCOSE 120* 80 155*    Hemoglobin A1C   Date Value Ref Range Status   08/29/2023 5.8 (H) 4.0 - 5.6 % Final     Comment:     ADA Screening Guidelines:  5.7-6.4%  Consistent with prediabetes  >or=6.5%  Consistent with diabetes    High levels of fetal hemoglobin interfere with the HbA1C  assay. Heterozygous hemoglobin variants (HbS, HgC, etc)do  not significantly interfere with this assay.   However, presence of multiple variants may affect accuracy.             ICU LOS 6d 3h  Level of Care: OK to Transfer    Chart Check: 24 HR Done  Shift Summary/Plan for the shift: Plan is to continue current treatment.Resting in bed peacefully, side rails up x3 with call light within reach. No acute distress noted, safety " maintained.

## 2024-01-17 NOTE — NURSING
[facility] Intensive Care  ICU Shift Summary  Date: 1/17/2024      Prehospitalization: Home  Admit Date / LOS : 1/11/2024/ 6 days    Diagnosis: Acute on chronic respiratory failure with hypoxia    Consults:        Active: Cardio, Palliative, and SW       Needed: N/A     Code Status: Full Code   Advanced Directive: Not Received    LDA:  Lines/Drains/Airways       Drain  Duration                  Gastrostomy/Enterostomy 01/04/22 Percutaneous endoscopic gastrostomy (PEG)  days    Male External Urinary Catheter 01/13/24 1800 3 days              Airway  Duration             Adult Surgical Airway 03/16/23 1014 Shiley Cuffed 6.0 307 days              Peripheral Intravenous Line  Duration                  Peripheral IV - Single Lumen 01/11/24 0013 20 G Right Antecubital 6 days         Peripheral IV - Single Lumen 01/15/24 1810 Anterior;Left;Proximal Forearm 1 day                  Central Lines/Site/Justification:Patient Does Not Have Central Line  Urinary Cath/Order/Justification:Patient Does Not Have Urinary Catheter    Vasopressors/Infusions:        GOALS: Volume/ Hemodynamic: N/A                     RASS: 0  alert and calm    Pain Management: none       Pain Controlled: yes     Rhythm: NSR and ST    Respiratory Device: Trach collar    Oxygen Concentration (%):  [40-60] 40                 Most Recent SBT/ SAT: N/A       MOVE Screen: PASS  ICU Liberation: yes    VTE Prophylaxis: Mechanical  Mobility: Bedrest  Stress Ulcer Prophylaxis: No    Isolation: No active isolations    Dietary:   Current Diet Order   Procedures    Diet NPO Except for: Sips with Medication     Order Specific Question:   Except for     Answer:   Sips with Medication      Tolerance: yes  Advancement: no    I & O (24h):    Intake/Output Summary (Last 24 hours) at 1/17/2024 1749  Last data filed at 1/17/2024 0900  Gross per 24 hour   Intake 744.07 ml   Output 950 ml   Net -205.93 ml        Restraints: No    Significant Dates:  Post Op Date:  "N/A  Rescue Date: N/A  Imaging/ Diagnostics: N/A    Noteworthy Labs:  none    COVID Test: (--)  CBC/Anemia Labs: Coags:    Recent Labs   Lab 01/16/24 0445 01/17/24 0358   WBC 6.50 9.09   HGB 9.9* 9.6*   HCT 32.4* 32.0*    206   MCV 95 95   RDW 14.7* 14.4    Recent Labs   Lab 01/11/24  0855 01/11/24  1557 01/16/24 0445 01/17/24  0358   INR 1.1  --   --   --    APTT 26.3   < > 26.6 26.5    < > = values in this interval not displayed.        Chemistries:   Recent Labs   Lab 01/12/24  0635 01/13/24  0241 01/16/24 0445 01/17/24  0358   *   < > 140 140   K 3.6   < > 4.0 3.5   CL 94*   < > 97 97   CO2 31*   < > 33* 31*   BUN 25*   < > 20 20   CREATININE 0.9   < > 0.8 1.0   CALCIUM 8.9   < > 9.8 9.7   PROT 6.6   < > 6.7 6.7   BILITOT 0.5   < > 0.5 0.4   ALKPHOS 76   < > 78 80   ALT 29   < > 19 18   AST 55*   < > 18 19   MG 2.1   < > 1.9 1.9   PHOS 3.0  --   --   --     < > = values in this interval not displayed.        Cardiac Enzymes: Ejection Fractions:    No results for input(s): "CPK", "CPKMB", "MB", "TROPONINI" in the last 72 hours. EF   Date Value Ref Range Status   03/20/2023 60 % Final        POCT Glucose: HbA1c:    Recent Labs   Lab 01/16/24  2351 01/17/24  0642 01/17/24  1201   POCTGLUCOSE 155* 144* 102    Hemoglobin A1C   Date Value Ref Range Status   08/29/2023 5.8 (H) 4.0 - 5.6 % Final     Comment:     ADA Screening Guidelines:  5.7-6.4%  Consistent with prediabetes  >or=6.5%  Consistent with diabetes    High levels of fetal hemoglobin interfere with the HbA1C  assay. Heterozygous hemoglobin variants (HbS, HgC, etc)do  not significantly interfere with this assay.   However, presence of multiple variants may affect accuracy.             ICU LOS 6d 14h  Level of Care: Critical Care    Chart Check: 12 HR Done  Shift Summary/Plan for the shift: none  "

## 2024-01-17 NOTE — ASSESSMENT & PLAN NOTE
Patient admitted with Hypoxic which is Acute on Chronic.  he is on home oxygen at 6 LPM trach collar. Signs/symptoms of respiratory failure include- increased work of breathing and respiratory distress present on admission.   - Labs and images were reviewed. Patient Has not has a recent ABG.   - Supplemental oxygen was provided and noted-  trach collar    - Respiratory failure is due to- CHF and Pneumonia   - CT with RML PNA. Trach aspirate suspected Pseudomonas. Continue vanc, zosyn for now- likely can drop vanc tomorrow   - BNP higher than ever before. TTE normal EF. NSTEMI present. Continue lasix 40mg IV daily- can likely change to PO tomorrow  - CTA no PE  - improving from admit  Consulted ID for Abx management. Continuing with Zosyn  Still on FIO2 at 40%,continue with HFO and frequent suctioning,pulmonology is on case,

## 2024-01-17 NOTE — PLAN OF CARE
Patient remains in the ICU at this time.  Per rounds this am patient may step down to med surg / tele.  At time of DC patient will DC with spouse and resume [place] .       01/17/24 1143   Discharge Reassessment   Assessment Type Discharge Planning Reassessment   Did the patient's condition or plan change since previous assessment? Yes   Discharge Plan discussed with: Patient   Communicated NISA with patient/caregiver Yes   Discharge Plan A Home Health   Discharge Plan B Home with family   DME Needed Upon Discharge  none   Transition of Care Barriers None   Why the patient remains in the hospital Requires continued medical care   Post-Acute Status   Discharge Delays None known at this time

## 2024-01-18 PROBLEM — I48.91 ATRIAL FIBRILLATION WITH RVR: Status: ACTIVE | Noted: 2024-01-18

## 2024-01-18 LAB
ALBUMIN SERPL BCP-MCNC: 2.7 G/DL (ref 3.5–5.2)
ALLENS TEST: ABNORMAL
ALP SERPL-CCNC: 74 U/L (ref 55–135)
ALT SERPL W/O P-5'-P-CCNC: 23 U/L (ref 10–44)
ANION GAP SERPL CALC-SCNC: 13 MMOL/L (ref 8–16)
AST SERPL-CCNC: 27 U/L (ref 10–40)
BASOPHILS # BLD AUTO: 0.02 K/UL (ref 0–0.2)
BASOPHILS NFR BLD: 0.3 % (ref 0–1.9)
BILIRUB SERPL-MCNC: 0.4 MG/DL (ref 0.1–1)
BUN SERPL-MCNC: 19 MG/DL (ref 8–23)
CALCIUM SERPL-MCNC: 9.6 MG/DL (ref 8.7–10.5)
CHLORIDE SERPL-SCNC: 97 MMOL/L (ref 95–110)
CO2 SERPL-SCNC: 32 MMOL/L (ref 23–29)
CREAT SERPL-MCNC: 0.9 MG/DL (ref 0.5–1.4)
DELSYS: ABNORMAL
DIFFERENTIAL METHOD BLD: ABNORMAL
EOSINOPHIL # BLD AUTO: 0 K/UL (ref 0–0.5)
EOSINOPHIL NFR BLD: 0.5 % (ref 0–8)
ERYTHROCYTE [DISTWIDTH] IN BLOOD BY AUTOMATED COUNT: 14.6 % (ref 11.5–14.5)
EST. GFR  (NO RACE VARIABLE): >60 ML/MIN/1.73 M^2
FIO2: 100
FLOW: 10
GLUCOSE SERPL-MCNC: 85 MG/DL (ref 70–110)
HCO3 UR-SCNC: 35.8 MMOL/L (ref 24–28)
HCT VFR BLD AUTO: 34.7 % (ref 40–54)
HGB BLD-MCNC: 10.1 G/DL (ref 14–18)
IMM GRANULOCYTES # BLD AUTO: 0.04 K/UL (ref 0–0.04)
IMM GRANULOCYTES NFR BLD AUTO: 0.7 % (ref 0–0.5)
LYMPHOCYTES # BLD AUTO: 0.3 K/UL (ref 1–4.8)
LYMPHOCYTES NFR BLD: 5.1 % (ref 18–48)
MAGNESIUM SERPL-MCNC: 2 MG/DL (ref 1.6–2.6)
MCH RBC QN AUTO: 28.4 PG (ref 27–31)
MCHC RBC AUTO-ENTMCNC: 29.1 G/DL (ref 32–36)
MCV RBC AUTO: 98 FL (ref 82–98)
MODE: ABNORMAL
MONOCYTES # BLD AUTO: 0.6 K/UL (ref 0.3–1)
MONOCYTES NFR BLD: 10 % (ref 4–15)
NEUTROPHILS # BLD AUTO: 4.9 K/UL (ref 1.8–7.7)
NEUTROPHILS NFR BLD: 83.4 % (ref 38–73)
NRBC BLD-RTO: 0 /100 WBC
PCO2 BLDA: 58.2 MMHG (ref 35–45)
PH SMN: 7.4 [PH] (ref 7.35–7.45)
PLATELET # BLD AUTO: 214 K/UL (ref 150–450)
PMV BLD AUTO: 10.2 FL (ref 9.2–12.9)
PO2 BLDA: 58 MMHG (ref 80–100)
POC BE: 9 MMOL/L
POC SATURATED O2: 89 % (ref 95–100)
POC TCO2: 38 MMOL/L (ref 23–27)
POTASSIUM SERPL-SCNC: 3.3 MMOL/L (ref 3.5–5.1)
PROT SERPL-MCNC: 7.2 G/DL (ref 6–8.4)
RBC # BLD AUTO: 3.56 M/UL (ref 4.6–6.2)
SAMPLE: ABNORMAL
SITE: ABNORMAL
SODIUM SERPL-SCNC: 142 MMOL/L (ref 136–145)
SP02: 96
WBC # BLD AUTO: 5.92 K/UL (ref 3.9–12.7)

## 2024-01-18 PROCEDURE — 94761 N-INVAS EAR/PLS OXIMETRY MLT: CPT | Mod: XB

## 2024-01-18 PROCEDURE — 36415 COLL VENOUS BLD VENIPUNCTURE: CPT | Performed by: HOSPITALIST

## 2024-01-18 PROCEDURE — 63600175 PHARM REV CODE 636 W HCPCS

## 2024-01-18 PROCEDURE — 25000242 PHARM REV CODE 250 ALT 637 W/ HCPCS: Performed by: HOSPITALIST

## 2024-01-18 PROCEDURE — 85025 COMPLETE CBC W/AUTO DIFF WBC: CPT | Performed by: HOSPITALIST

## 2024-01-18 PROCEDURE — 25000003 PHARM REV CODE 250: Performed by: STUDENT IN AN ORGANIZED HEALTH CARE EDUCATION/TRAINING PROGRAM

## 2024-01-18 PROCEDURE — 25000003 PHARM REV CODE 250: Performed by: HOSPITALIST

## 2024-01-18 PROCEDURE — 63600175 PHARM REV CODE 636 W HCPCS: Performed by: STUDENT IN AN ORGANIZED HEALTH CARE EDUCATION/TRAINING PROGRAM

## 2024-01-18 PROCEDURE — 83735 ASSAY OF MAGNESIUM: CPT | Performed by: HOSPITALIST

## 2024-01-18 PROCEDURE — 36600 WITHDRAWAL OF ARTERIAL BLOOD: CPT

## 2024-01-18 PROCEDURE — 99900026 HC AIRWAY MAINTENANCE (STAT)

## 2024-01-18 PROCEDURE — 82803 BLOOD GASES ANY COMBINATION: CPT

## 2024-01-18 PROCEDURE — 99900035 HC TECH TIME PER 15 MIN (STAT)

## 2024-01-18 PROCEDURE — 93010 ELECTROCARDIOGRAM REPORT: CPT | Mod: ,,, | Performed by: INTERNAL MEDICINE

## 2024-01-18 PROCEDURE — 94760 N-INVAS EAR/PLS OXIMETRY 1: CPT | Mod: XB

## 2024-01-18 PROCEDURE — 93005 ELECTROCARDIOGRAM TRACING: CPT

## 2024-01-18 PROCEDURE — 99291 CRITICAL CARE FIRST HOUR: CPT | Mod: ,,, | Performed by: INTERNAL MEDICINE

## 2024-01-18 PROCEDURE — 25000242 PHARM REV CODE 250 ALT 637 W/ HCPCS: Performed by: STUDENT IN AN ORGANIZED HEALTH CARE EDUCATION/TRAINING PROGRAM

## 2024-01-18 PROCEDURE — 20000000 HC ICU ROOM

## 2024-01-18 PROCEDURE — 27100171 HC OXYGEN HIGH FLOW UP TO 24 HOURS

## 2024-01-18 PROCEDURE — 80053 COMPREHEN METABOLIC PANEL: CPT | Performed by: HOSPITALIST

## 2024-01-18 PROCEDURE — 94640 AIRWAY INHALATION TREATMENT: CPT

## 2024-01-18 RX ORDER — METOPROLOL TARTRATE 25 MG/1
25 TABLET, FILM COATED ORAL 2 TIMES DAILY
Status: DISCONTINUED | OUTPATIENT
Start: 2024-01-18 | End: 2024-01-21

## 2024-01-18 RX ORDER — LEVALBUTEROL 1.25 MG/.5ML
1.25 SOLUTION, CONCENTRATE RESPIRATORY (INHALATION)
Status: DISCONTINUED | OUTPATIENT
Start: 2024-01-18 | End: 2024-01-19

## 2024-01-18 RX ADMIN — HYDROXYZINE HYDROCHLORIDE 25 MG: 25 TABLET ORAL at 01:01

## 2024-01-18 RX ADMIN — AMIODARONE HYDROCHLORIDE 0.5 MG/MIN: 1.8 INJECTION, SOLUTION INTRAVENOUS at 07:01

## 2024-01-18 RX ADMIN — IPRATROPIUM BROMIDE AND ALBUTEROL SULFATE 3 ML: 2.5; .5 SOLUTION RESPIRATORY (INHALATION) at 01:01

## 2024-01-18 RX ADMIN — ASPIRIN 81 MG CHEWABLE TABLET 81 MG: 81 TABLET CHEWABLE at 09:01

## 2024-01-18 RX ADMIN — AMIODARONE HYDROCHLORIDE 1 MG/MIN: 1.8 INJECTION, SOLUTION INTRAVENOUS at 08:01

## 2024-01-18 RX ADMIN — IPRATROPIUM BROMIDE AND ALBUTEROL SULFATE 3 ML: 2.5; .5 SOLUTION RESPIRATORY (INHALATION) at 06:01

## 2024-01-18 RX ADMIN — METOPROLOL TARTRATE 25 MG: 25 TABLET, FILM COATED ORAL at 08:01

## 2024-01-18 RX ADMIN — IPRATROPIUM BROMIDE AND ALBUTEROL SULFATE 3 ML: 2.5; .5 SOLUTION RESPIRATORY (INHALATION) at 03:01

## 2024-01-18 RX ADMIN — FOLIC ACID 1 MG: 1 TABLET ORAL at 09:01

## 2024-01-18 RX ADMIN — FLUOXETINE HYDROCHLORIDE 20 MG: 20 SOLUTION ORAL at 12:01

## 2024-01-18 RX ADMIN — AMIODARONE HYDROCHLORIDE 0.5 MG/MIN: 1.8 INJECTION, SOLUTION INTRAVENOUS at 01:01

## 2024-01-18 RX ADMIN — HYDROXYZINE HYDROCHLORIDE 25 MG: 25 TABLET ORAL at 08:01

## 2024-01-18 RX ADMIN — SODIUM CHLORIDE SOLN NEBU 3% 4 ML: 3 NEBU SOLN at 07:01

## 2024-01-18 RX ADMIN — CLOPIDOGREL BISULFATE 75 MG: 75 TABLET ORAL at 08:01

## 2024-01-18 RX ADMIN — POTASSIUM BICARBONATE 50 MEQ: 977.5 TABLET, EFFERVESCENT ORAL at 12:01

## 2024-01-18 RX ADMIN — ATORVASTATIN CALCIUM 40 MG: 40 TABLET, FILM COATED ORAL at 09:01

## 2024-01-18 RX ADMIN — APIXABAN 2.5 MG: 2.5 TABLET, FILM COATED ORAL at 09:01

## 2024-01-18 RX ADMIN — APIXABAN 2.5 MG: 2.5 TABLET, FILM COATED ORAL at 08:01

## 2024-01-18 RX ADMIN — AMIODARONE HYDROCHLORIDE 150 MG: 1.5 INJECTION, SOLUTION INTRAVENOUS at 08:01

## 2024-01-18 RX ADMIN — LEVALBUTEROL 1.25 MG: 1.25 SOLUTION, CONCENTRATE RESPIRATORY (INHALATION) at 03:01

## 2024-01-18 RX ADMIN — SODIUM CHLORIDE SOLN NEBU 3% 4 ML: 3 NEBU SOLN at 06:01

## 2024-01-18 RX ADMIN — FUROSEMIDE 20 MG: 20 TABLET ORAL at 09:01

## 2024-01-18 RX ADMIN — MELATONIN TAB 3 MG 6 MG: 3 TAB at 08:01

## 2024-01-18 NOTE — SUBJECTIVE & OBJECTIVE
Interval History:  No chest pains.  No cardiac complaints          Past Medical History:   Diagnosis Date    Cancer     skin to Rt forearm and face    COPD (chronic obstructive pulmonary disease)     Hyperlipidemia     Hypertension     Pseudoaneurysm        Past Surgical History:   Procedure Laterality Date    ABDOMINAL SURGERY      stents placed in liver and large intestines, per patient    ANGIOGRAPHY OF AORTIC ARCH  3/27/2023    Procedure: Aortogram, Aortic Arch;  Surgeon: Tim Bhatt MD;  Location: [facility] CATH LAB;  Service: Cardiology;;    AORTOGRAPHY WITH SERIALOGRAPHY N/A 3/27/2023    Procedure: AORTOGRAM, WITH SERIALOGRAPHY;  Surgeon: Tim Bhatt MD;  Location: [facility] CATH LAB;  Service: Cardiology;  Laterality: N/A;    CAROTID STENT      COLONOSCOPY N/A 09/27/2022    Procedure: COLONOSCOPY;  Surgeon: Donnie Peterson MD;  Location: [facility] ENDO (2ND FLR);  Service: Endoscopy;  Laterality: N/A;    COLONOSCOPY N/A 09/30/2022    Procedure: COLONOSCOPY;  Surgeon: Joy Cabrera MD;  Location: [facility] ENDO (2ND FLR);  Service: Endoscopy;  Laterality: N/A;    CORONARY STENT PLACEMENT  01/2000    DISSECTION OF NECK Bilateral 01/04/2022    Procedure: DISSECTION, NECK;  Surgeon: Naeem Smalls MD;  Location: [facility] OR 2ND FLR;  Service: ENT;  Laterality: Bilateral;    ESOPHAGOGASTRODUODENOSCOPY N/A 09/27/2022    Procedure: EGD (ESOPHAGOGASTRODUODENOSCOPY);  Surgeon: Donnie Peterson MD;  Location: [facility] ENDO (2ND FLR);  Service: Endoscopy;  Laterality: N/A;    ESOPHAGOGASTRODUODENOSCOPY N/A 09/30/2022    Procedure: EGD (ESOPHAGOGASTRODUODENOSCOPY);  Surgeon: Joy Cabrera MD;  Location: [facility] ENDO (2ND FLR);  Service: Endoscopy;  Laterality: N/A;    ESOPHAGOGASTRODUODENOSCOPY W/ PEG N/A 01/04/2022    Procedure: EGD, WITH PEG TUBE INSERTION;  Surgeon: Anthony Calabrese MD;  Location: [facility] OR 2ND FLR;  Service: General;  Laterality: N/A;    EYE SURGERY      Cataract bilateral    femoral stents      bilateral    FLAP  PROCEDURE N/A 01/03/2022    Procedure: CREATION, FREE FLAP;  Surgeon: Naeem Smalls MD;  Location: [facility] OR [facility] FLR;  Service: ENT;  Laterality: N/A;    FLAP PROCEDURE Right 01/04/2022    Procedure: CREATION, FREE FLAP;  Surgeon: Stacey Conde MD;  [facility];  Service: ENT;  Laterality: Right;  Ischemic start 1203  Ischemic stop 1353    GLOSSECTOMY N/A 01/04/2022    Procedure: GLOSSECTOMY;  Surgeon: Naeem Smalls MD;  Location: [facility] OR [facility] FLR;  Service: ENT;  Laterality: N/A;    LEFT HEART CATHETERIZATION Left 3/23/2023    Procedure: Left heart cath;  Surgeon: Tacos Benitez MD;  Location: [facility] CATH LAB;  Service: Cardiology;  Laterality: Left;    PERCUTANEOUS TRANSLUMINAL BALLOON ANGIOPLASTY OF CORONARY ARTERY Left 3/27/2023    Procedure: Angioplasty-coronary;  Surgeon: Tim Bhatt MD;  Location: [facility] CATH LAB;  Service: Cardiology;  Laterality: Left;  not before 9am, R rad access, 6 Fr EBU 3.5 guide    RADICAL NECK DISSECTION Left 01/03/2022    Procedure: DISSECTION, NECK, RADICAL;  Surgeon: Naeem Smalls MD;  [facility];  Service: ENT;  Laterality: Left;    SKIN BIOPSY      SKIN CANCER EXCISION      STENT, CORONARY, MULTI VESSEL  3/27/2023    Procedure: Stent, Coronary, Multi Vessel;  Surgeon: Tim Bhatt MD;  Location: [facility] CATH LAB;  Service: Cardiology;;    TRACHEOTOMY N/A 01/04/2022    Procedure: TRACHEOTOMY;  Surgeon: Naeem Smalls MD;  [facility];  Service: ENT;  Laterality: N/A;    VASCULAR SURGERY         Review of patient's allergies indicates:   Allergen Reactions    Ativan [lorazepam] Anxiety       No current facility-administered medications on file prior to encounter.     Current Outpatient Medications on File Prior to Encounter   Medication Sig    albuterol-ipratropium (DUO-NEB) 2.5 mg-0.5 mg/3 mL nebulizer solution Take 3 mLs by nebulization every 4 (four) hours as needed for Wheezing. Rescue    ascorbic  acid, vitamin C, (VITAMIN C) 100 MG tablet Take 100 mg by mouth once daily.    aspirin 81 MG Chew Take 1 tablet (81 mg total) by mouth once daily.    atorvastatin (LIPITOR) 40 MG tablet 1 tablet (40 mg total) by Per G Tube route every evening. (Patient taking differently: 40 mg by Per G Tube route once daily.)    bisacodyL (DULCOLAX) 10 mg Supp Place 1 suppository (10 mg total) rectally daily as needed.    clopidogreL (PLAVIX) 75 mg tablet Take 1 tablet (75 mg total) by mouth once daily. (Patient taking differently: Take 75 mg by mouth nightly.)    ferrous sulfate 325 (65 FE) MG EC tablet Take 325 mg by mouth 3 (three) times daily with meals.    FLUoxetine (PROZAC) 20 mg/5 mL (4 mg/mL) solution Take 20 mg by mouth once daily.    folic acid (FOLVITE) 1 MG tablet Take 1 mg by mouth once daily.    furosemide (LASIX) 40 MG tablet Take 0.5 tablets (20 mg total) by mouth once daily. (Patient taking differently: Take 20 mg by mouth nightly.)    glycopyrrolate (CUVPOSA) 1 mg/5 mL (0.2 mg/mL) Soln Take 5 mLs (1 mg total) by mouth 3 (three) times daily as needed (TO REDUCE SECRETIONS).    hydrOXYzine HCL (ATARAX) 25 MG tablet SMARTSI.5 Tablet(s) Gastro Tube Every 8 Hours PRN    melatonin (MELATIN) 3 mg tablet 1 tablet (3 mg total) by Per G Tube route nightly.    metoprolol tartrate (LOPRESSOR) 25 MG tablet Take 0.5 tablets (12.5 mg total) by mouth 2 (two) times daily.    omeprazole (PRILOSEC) 40 MG capsule Take 40 mg (contents of one capsule) twice daily through PEG tube. Mix contents of capsule with 10 mL applesauce. (Patient taking differently: 40 mg 2 (two) times a day. Take 40 mg (contents of one capsule) twice daily through PEG tube. Mix contents of capsule with 10 mL applesauce.)    polyethylene glycol (GLYCOLAX) 17 gram PwPk 17 g by Per G Tube route 2 (two) times daily.    sodium chloride 3% 3 % nebulizer solution Take 4 mLs by nebulization 2 (two) times a day.    thiamine (VITAMIN B-1) 50 MG tablet Take 50 mg by  mouth once daily.    WIXELA INHUB 250-50 mcg/dose diskus inhaler SMARTSI Puff(s) By Mouth Every 12 Hours    [DISCONTINUED] losartan (COZAAR) 50 MG tablet 1 tablet (50 mg total) by Per G Tube route once daily.     Family History    None       Tobacco Use    Smoking status: Former     Current packs/day: 0.00     Average packs/day: 2.0 packs/day for 55.0 years (110.0 ttl pk-yrs)     Types: Cigarettes     Start date: 1963     Quit date: 2018     Years since quittin.7    Smokeless tobacco: Never    Tobacco comments:     3/3 ppd x 40 yrs. Currently 3-4 cigarettes daily .He is trying  to quit. Is using a Vapor cigarettes  2-3 x's a day.   Substance and Sexual Activity    Alcohol use: Not Currently    Drug use: No    Sexual activity: Not Currently     Review of Systems   Constitutional:  Positive for fatigue and fever.   HENT:  Negative for congestion.    Eyes:  Negative for discharge.   Respiratory:  Positive for cough and shortness of breath. Negative for chest tightness.    Cardiovascular:  Negative for chest pain and leg swelling.   Gastrointestinal:  Negative for abdominal pain.   Endocrine: Negative for polyphagia.   Genitourinary:  Negative for difficulty urinating.   Musculoskeletal:  Positive for arthralgias.   Skin:  Negative for color change.   Allergic/Immunologic: Negative for food allergies.   Neurological:  Negative for headaches.   Psychiatric/Behavioral:  Negative for confusion.      Objective:     Vital Signs (Most Recent):  Temp: 98.2 °F (36.8 °C) (24 1501)  Pulse: (!) 114 (24 1800)  Resp: (!) 37 (24 1800)  BP: 98/70 (24 1800)  SpO2: (!) 93 % (24 1800) Vital Signs (24h Range):  Temp:  [97.7 °F (36.5 °C)-99 °F (37.2 °C)] 98.2 °F (36.8 °C)  Pulse:  [] 114  Resp:  [22-39] 37  SpO2:  [89 %-100 %] 93 %  BP: ()/(55-83) 98/70     Weight: 57.5 kg (126 lb 12.2 oz)  Body mass index is 18.72 kg/m².     Physical Exam  Vitals and nursing note reviewed.    Constitutional:       General: He is not in acute distress.     Appearance: He is ill-appearing (chronically). He is not toxic-appearing.   HENT:      Head: Normocephalic and atraumatic.      Mouth/Throat:      Mouth: Mucous membranes are moist.   Cardiovascular:      Rate and Rhythm: Normal rate and regular rhythm.   Pulmonary:      Effort: Pulmonary effort is normal.      Breath sounds: Normal breath sounds. No wheezing, rhonchi or rales.      Comments: 10L trach collar  Abdominal:      General: Bowel sounds are normal. There is no distension.      Palpations: Abdomen is soft. There is no mass.      Tenderness: There is no abdominal tenderness. There is no guarding.      Comments: PEG in place   Musculoskeletal:      Right lower leg: No edema.      Left lower leg: No edema.   Skin:     General: Skin is warm and dry.   Neurological:      Mental Status: He is alert. Mental status is at baseline.                  DATA:     Laboratory:  CBC:  Recent Labs   Lab 01/15/24  0359 01/16/24  0445 01/17/24  0358   WBC 7.20 6.50 9.09   Hemoglobin 10.1 L 9.9 L 9.6 L   Hematocrit 34.3 L 32.4 L 32.0 L   Platelets 184 201 206         CHEMISTRIES:  Recent Labs   Lab 05/12/22  0343 05/14/22  0724 05/17/22  1007 08/24/22  1000 01/15/24  0359 01/16/24  0445 01/17/24  0358   Glucose 110 96 162 H   < > 91 94 154 H   Sodium 135 L 136 133 L   < > 139 140 140   Potassium 4.4 4.1 5.0   < > 3.4 L 4.0 3.5   BUN 24 H 18 15   < > 23 20 20   Creatinine 0.7 0.7 0.7   < > 0.9 0.8 1.0   eGFR if African American >60.0 >60 >60.0  --   --   --   --    eGFR if non African American >60.0 >60 >60.0  --   --   --   --    Calcium 9.1 9.6 9.5   < > 9.5 9.8 9.7   Magnesium 2.0  --  2.0   < > 2.1 1.9 1.9    < > = values in this interval not displayed.         CARDIAC BIOMARKERS:  Recent Labs   Lab 01/11/24  0021 01/11/24  0311 01/11/24  0855   Troponin I 0.393 H 1.011 H 3.344 H         COANANCY:  Recent Labs   Lab 04/02/23  0322 11/26/23  0505 01/11/24  0855    INR 1.0 1.1 1.1         LIPIDS/LFTS:  Recent Labs   Lab 08/29/23  1522 10/20/23  0815 01/12/24  0635 01/13/24  0241 01/15/24  0359 01/16/24  0445 01/17/24  0358   Cholesterol 85 L  --  95 L  --   --   --   --    Triglycerides 68  --  60  --   --   --   --    HDL 25 L  --  37 L  --   --   --   --    LDL Cholesterol 46.4 L  --  46.0 L  --   --   --   --    Non-HDL Cholesterol 60  --  58  --   --   --   --    AST 20   < > 55 H   < > 21 18 19   ALT 28   < > 29   < > 21 19 18    < > = values in this interval not displayed.         Hemoglobin A1C   Date Value Ref Range Status   08/29/2023 5.8 (H) 4.0 - 5.6 % Final     Comment:     ADA Screening Guidelines:  5.7-6.4%  Consistent with prediabetes  >or=6.5%  Consistent with diabetes    High levels of fetal hemoglobin interfere with the HbA1C  assay. Heterozygous hemoglobin variants (HbS, HgC, etc)do  not significantly interfere with this assay.   However, presence of multiple variants may affect accuracy.     09/27/2022 6.3 (H) 4.0 - 5.6 % Final     Comment:     ADA Screening Guidelines:  5.7-6.4%  Consistent with prediabetes  >or=6.5%  Consistent with diabetes    High levels of fetal hemoglobin interfere with the HbA1C  assay. Heterozygous hemoglobin variants (HbS, HgC, etc)do  not significantly interfere with this assay.   However, presence of multiple variants may affect accuracy.         TSH  Recent Labs   Lab 05/23/23  0849 07/10/23  1040 08/29/23  1522   TSH 4.379 H 3.610 4.460 H         The ASCVD Risk score (Caitlyn DK, et al., 2019) failed to calculate for the following reasons:    The patient has a prior MI or stroke diagnosis       BNP    Lab Results   Component Value Date/Time    BNP 2,467 (H) 01/11/2024 12:21 AM    BNP 1,708 (H) 11/20/2023 04:06 PM     (H) 10/20/2023 08:15 AM    BNP 1,185 (H) 04/18/2023 07:07 AM    BNP 2,543 (H) 03/21/2023 03:25 PM     (H) 03/18/2023 11:06 AM     (H) 09/23/2022 12:47 PM     (H) 09/14/2022 04:59 AM     BNP 98 05/14/2022 07:25 AM     (H) 04/22/2022 05:04 PM    BNP 1,607 (H) 04/19/2022 09:29 AM     (H) 03/21/2022 02:39 PM     (H) 11/02/2019 02:52 AM     (H) 12/27/2018 01:29 PM            ECHO    Results for orders placed during the hospital encounter of 01/11/24    Echo    Interpretation Summary    Left Ventricle: The left ventricle is normal in size. Mildly increased wall thickness. There is concentric hypertrophy. Normal wall motion. There is low normal systolic function with a visually estimated ejection fraction of 50 - 55%. Unable to assess diastolic function due to E-A fusion.    Right Ventricle: Normal right ventricular cavity size. Systolic function is normal.    Left Atrium: Left atrium is moderately dilated.    Right Atrium: Right atrium is mildly dilated.    Mitral Valve: There is mild regurgitation.    Tricuspid Valve: There is mild regurgitation.    Pulmonary Artery: The estimated pulmonary artery systolic pressure is 13 mmHg.    IVC/SVC: Normal venous pressure at 3 mmHg.      STRESS TEST    No results found for this or any previous visit.        CATH    Results for orders placed during the hospital encounter of 03/18/23    Cardiac catheterization    Conclusion  Description of the findings of the procedure: uneventful LHC/cor angio/Ao angio/iliac angio/PCI prox LAD BRADLEY x1, PCI mid LCx BRADLEY x1/R rad vasband/RFA man comp.    Findings/Key Components:  LVEDP: 3mmHg  LVEF: 60%    Aortic arch: severe calcific atherosclerosis  Ost innominate artery 80%  Prox R SCA 90%, 53mmHg gradient across stenoses.  Prox-mid ICA patent  Distal aortoiliac bifurcation with patent stents in ЮЛИЯ bilaterally and possible severe ISR in R ЮЛИЯ.    Severe diffuse cor Ca++  Dominance: Right  LM: MLI  LAD: prox 95% at D1, mid  Ramus: MLI  LCx: mid 90%  RCA: not injected, diffuse disease, severe dist RCA stenosis (see Dr. Benitez cath report    PCI prox LAD:  Preop ASA/Plavix, intraop heparin  Wired  LAD/D1  Predil 2.5x12, 2.75x15 NC  Stent 3.0x24 Synergy BRADLEY  Post IVUS with mild underexpansion mid stent  Postdil 3.25x20 NC at 22atm  Excellent angiographic result, T3 flow, 0% residual stenosis    PCI mid LCx  Predil 2.5x12  Stent 2.75x20 Synergy BRADLEY 14 fidelia  Unavble to pass IVUS due to severe prox LCx tortuosity  Excellent angiographic result, T3 flow, 0% residual stenosis    Hemostasis:  R Radial band  RFA man comp

## 2024-01-18 NOTE — NURSING
Nurses Note -- 4 Eyes      1/17/2024   7:00 PM      Skin assessed during: Q Shift Change      [x] No Altered Skin Integrity Present    [x]Prevention Measures Documented      [] Yes- Altered Skin Integrity Present or Discovered   [] LDA Added if Not in Epic (Describe Wound)   [] New Altered Skin Integrity was Present on Admit and Documented in LDA   [] Wound Image Taken    Wound Care Consulted? No    Attending Nurse:  Reynold Medina RN/Staff Member:  KAMILAH Cross

## 2024-01-18 NOTE — NURSING
Patient reported to be in Afib RVR during shift report.  came to bedside to assess. EKG done, patinet c/o dyspnea. O2 93% with trach 60%, 8L .   NS bolus started as ordered and patient transferred to ICU.

## 2024-01-18 NOTE — CARE UPDATE
Notified by nursing staff that patient's heart rate in 150s and reading Atrial fibrillation with RVR. Reviewed telemetry, appears more irregular but noted P waves. ECG reading afib with RVR in 140-150s and noted ST changes, with one ECG reading Acute STEMI. Currently denies chest pain but endorses shortness of breath. Awake and alert. Unable to obtain BP reading. SpO2 91-95%.     Called Dr. Malone and discussed case, no chest pain and ECG changes likely rate related. Plan to transfer to ICU, start IV amio with bolus. Will give IVF bolus now for low BP. Spoke to patient's wife Bridgett and updated her on transfer to ICU.     Transferred to ICU, BP improved and patient appearing in less distress, mental status alert and awake and still chest pain free.     I updated Dr. Heller on patient's status and transfer.       Luiz Patel MD  Department of Hospital Medicine  [facility]      Critical care time spent on the evaluation and treatment of severe organ dysfunction, review of pertinent labs and imaging studies, discussions with consulting providers and discussions with patient/family: 62 minutes.

## 2024-01-18 NOTE — PROGRESS NOTES
[facility] [place] Intensive Care  Cardiology  Progress Note    Patient Name: Fareed Richard Jr.  MRN: [id number]  Admission Date: 1/11/2024  Hospital Length of Stay: 6 days  Code Status: Full Code   Attending Physician: Santos Marlow, *   Primary Care Physician: Kodi Tubbs MD  Expected Discharge Date: 1/19/2024  Principal Problem:Acute on chronic respiratory failure with hypoxia    Subjective:       Interval History:  No chest pains.  No cardiac complaints          Past Medical History:   Diagnosis Date    Cancer     skin to Rt forearm and face    COPD (chronic obstructive pulmonary disease)     Hyperlipidemia     Hypertension     Pseudoaneurysm        Past Surgical History:   Procedure Laterality Date    ABDOMINAL SURGERY      stents placed in liver and large intestines, per patient    ANGIOGRAPHY OF AORTIC ARCH  3/27/2023    Procedure: Aortogram, Aortic Arch;  Surgeon: Tim Bhatt MD;  Location: [facility] CATH LAB;  Service: Cardiology;;    AORTOGRAPHY WITH SERIALOGRAPHY N/A 3/27/2023    Procedure: AORTOGRAM, WITH SERIALOGRAPHY;  Surgeon: Tim Bhatt MD;  Location: [facility] CATH LAB;  Service: Cardiology;  Laterality: N/A;    CAROTID STENT      COLONOSCOPY N/A 09/27/2022    Procedure: COLONOSCOPY;  Surgeon: Donnie Peterson MD;  [facility]  Service: Endoscopy;  Laterality: N/A;    COLONOSCOPY N/A 09/30/2022    Procedure: COLONOSCOPY;  Surgeon: Joy Cabrera MD;  Location: [facility] ENDO (2ND FLR);  Service: Endoscopy;  Laterality: N/A;    CORONARY STENT PLACEMENT  01/2000    DISSECTION OF NECK Bilateral 01/04/2022    Procedure: DISSECTION, NECK;  Surgeon: Naeem Smalls MD;  Location: [facility] OR 2ND FLR;  Service: ENT;  Laterality: Bilateral;    ESOPHAGOGASTRODUODENOSCOPY N/A 09/27/2022    Procedure: EGD (ESOPHAGOGASTRODUODENOSCOPY);  Surgeon: Donnie Peterson MD;  [facility]  Service: Endoscopy;  Laterality: N/A;    ESOPHAGOGASTRODUODENOSCOPY N/A 09/30/2022     Procedure: EGD (ESOPHAGOGASTRODUODENOSCOPY);  Surgeon: Joy Cabrera MD;  Location: [facility] ENDO (2ND FLR);  Service: Endoscopy;  Laterality: N/A;    ESOPHAGOGASTRODUODENOSCOPY W/ PEG N/A 01/04/2022    Procedure: EGD, WITH PEG TUBE INSERTION;  Surgeon: Anthony Calabrese MD;  [facility];  Service: General;  Laterality: N/A;    EYE SURGERY      Cataract bilateral    femoral stents      bilateral    FLAP PROCEDURE N/A 01/03/2022    Procedure: CREATION, FREE FLAP;  Surgeon: Naeem Smalls MD;  [facility];  Service: ENT;  Laterality: N/A;    FLAP PROCEDURE Right 01/04/2022    Procedure: CREATION, FREE FLAP;  Surgeon: Stacey Conde MD;  [facility];  Service: ENT;  Laterality: Right;  Ischemic start 1203  Ischemic stop 1353    GLOSSECTOMY N/A 01/04/2022    Procedure: GLOSSECTOMY;  Surgeon: Naeem Smalls MD;  [facility];  Service: ENT;  Laterality: N/A;    LEFT HEART CATHETERIZATION Left 3/23/2023    Procedure: Left heart cath;  Surgeon: Tacos Benitez MD;  Location: [facility] CATH LAB;  Service: Cardiology;  Laterality: Left;    PERCUTANEOUS TRANSLUMINAL BALLOON ANGIOPLASTY OF CORONARY ARTERY Left 3/27/2023    Procedure: Angioplasty-coronary;  Surgeon: Tim Bhatt MD;  Location: [facility] CATH LAB;  Service: Cardiology;  Laterality: Left;  not before 9am, R rad access, 6 Fr EBU 3.5 guide    RADICAL NECK DISSECTION Left 01/03/2022    Procedure: DISSECTION, NECK, RADICAL;  Surgeon: Naeem Smalls MD;  [facility];  Service: ENT;  Laterality: Left;    SKIN BIOPSY      SKIN CANCER EXCISION      STENT, CORONARY, MULTI VESSEL  3/27/2023    Procedure: Stent, Coronary, Multi Vessel;  Surgeon: Tim Bhatt MD;  Location: [facility] CATH LAB;  Service: Cardiology;;    TRACHEOTOMY N/A 01/04/2022    Procedure: TRACHEOTOMY;  Surgeon: Naeem Smalls MD;  [facility];  Service: ENT;  Laterality: N/A;    VASCULAR SURGERY          Review of patient's allergies indicates:   Allergen Reactions    Ativan [lorazepam] Anxiety       No current facility-administered medications on file prior to encounter.     Current Outpatient Medications on File Prior to Encounter   Medication Sig    albuterol-ipratropium (DUO-NEB) 2.5 mg-0.5 mg/3 mL nebulizer solution Take 3 mLs by nebulization every 4 (four) hours as needed for Wheezing. Rescue    ascorbic acid, vitamin C, (VITAMIN C) 100 MG tablet Take 100 mg by mouth once daily.    aspirin 81 MG Chew Take 1 tablet (81 mg total) by mouth once daily.    atorvastatin (LIPITOR) 40 MG tablet 1 tablet (40 mg total) by Per G Tube route every evening. (Patient taking differently: 40 mg by Per G Tube route once daily.)    bisacodyL (DULCOLAX) 10 mg Supp Place 1 suppository (10 mg total) rectally daily as needed.    clopidogreL (PLAVIX) 75 mg tablet Take 1 tablet (75 mg total) by mouth once daily. (Patient taking differently: Take 75 mg by mouth nightly.)    ferrous sulfate 325 (65 FE) MG EC tablet Take 325 mg by mouth 3 (three) times daily with meals.    FLUoxetine (PROZAC) 20 mg/5 mL (4 mg/mL) solution Take 20 mg by mouth once daily.    folic acid (FOLVITE) 1 MG tablet Take 1 mg by mouth once daily.    furosemide (LASIX) 40 MG tablet Take 0.5 tablets (20 mg total) by mouth once daily. (Patient taking differently: Take 20 mg by mouth nightly.)    glycopyrrolate (CUVPOSA) 1 mg/5 mL (0.2 mg/mL) Soln Take 5 mLs (1 mg total) by mouth 3 (three) times daily as needed (TO REDUCE SECRETIONS).    hydrOXYzine HCL (ATARAX) 25 MG tablet SMARTSI.5 Tablet(s) Gastro Tube Every 8 Hours PRN    melatonin (MELATIN) 3 mg tablet 1 tablet (3 mg total) by Per G Tube route nightly.    metoprolol tartrate (LOPRESSOR) 25 MG tablet Take 0.5 tablets (12.5 mg total) by mouth 2 (two) times daily.    omeprazole (PRILOSEC) 40 MG capsule Take 40 mg (contents of one capsule) twice daily through PEG tube. Mix contents of capsule with 10 mL  applesauce. (Patient taking differently: 40 mg 2 (two) times a day. Take 40 mg (contents of one capsule) twice daily through PEG tube. Mix contents of capsule with 10 mL applesauce.)    polyethylene glycol (GLYCOLAX) 17 gram PwPk 17 g by Per G Tube route 2 (two) times daily.    sodium chloride 3% 3 % nebulizer solution Take 4 mLs by nebulization 2 (two) times a day.    thiamine (VITAMIN B-1) 50 MG tablet Take 50 mg by mouth once daily.    WIXELA INHUB 250-50 mcg/dose diskus inhaler SMARTSI Puff(s) By Mouth Every 12 Hours    [DISCONTINUED] losartan (COZAAR) 50 MG tablet 1 tablet (50 mg total) by Per G Tube route once daily.     Family History    None       Tobacco Use    Smoking status: Former     Current packs/day: 0.00     Average packs/day: 2.0 packs/day for 55.0 years (110.0 ttl pk-yrs)     Types: Cigarettes     Start date: 1963     Quit date: 2018     Years since quittin.7    Smokeless tobacco: Never    Tobacco comments:     3/3 ppd x 40 yrs. Currently 3-4 cigarettes daily .He is trying  to quit. Is using a Vapor cigarettes  2-3 x's a day.   Substance and Sexual Activity    Alcohol use: Not Currently    Drug use: No    Sexual activity: Not Currently     Review of Systems   Constitutional:  Positive for fatigue and fever.   HENT:  Negative for congestion.    Eyes:  Negative for discharge.   Respiratory:  Positive for cough and shortness of breath. Negative for chest tightness.    Cardiovascular:  Negative for chest pain and leg swelling.   Gastrointestinal:  Negative for abdominal pain.   Endocrine: Negative for polyphagia.   Genitourinary:  Negative for difficulty urinating.   Musculoskeletal:  Positive for arthralgias.   Skin:  Negative for color change.   Allergic/Immunologic: Negative for food allergies.   Neurological:  Negative for headaches.   Psychiatric/Behavioral:  Negative for confusion.      Objective:     Vital Signs (Most Recent):  Temp: 98.2 °F (36.8 °C) (24 1501)  Pulse: (!)  114 (01/17/24 1800)  Resp: (!) 37 (01/17/24 1800)  BP: 98/70 (01/17/24 1800)  SpO2: (!) 93 % (01/17/24 1800) Vital Signs (24h Range):  Temp:  [97.7 °F (36.5 °C)-99 °F (37.2 °C)] 98.2 °F (36.8 °C)  Pulse:  [] 114  Resp:  [22-39] 37  SpO2:  [89 %-100 %] 93 %  BP: ()/(55-83) 98/70     Weight: 57.5 kg (126 lb 12.2 oz)  Body mass index is 18.72 kg/m².     Physical Exam  Vitals and nursing note reviewed.   Constitutional:       General: He is not in acute distress.     Appearance: He is ill-appearing (chronically). He is not toxic-appearing.   HENT:      Head: Normocephalic and atraumatic.      Mouth/Throat:      Mouth: Mucous membranes are moist.   Cardiovascular:      Rate and Rhythm: Normal rate and regular rhythm.   Pulmonary:      Effort: Pulmonary effort is normal.      Breath sounds: Normal breath sounds. No wheezing, rhonchi or rales.      Comments: 10L trach collar  Abdominal:      General: Bowel sounds are normal. There is no distension.      Palpations: Abdomen is soft. There is no mass.      Tenderness: There is no abdominal tenderness. There is no guarding.      Comments: PEG in place   Musculoskeletal:      Right lower leg: No edema.      Left lower leg: No edema.   Skin:     General: Skin is warm and dry.   Neurological:      Mental Status: He is alert. Mental status is at baseline.                  DATA:     Laboratory:  CBC:  Recent Labs   Lab 01/15/24  0359 01/16/24  0445 01/17/24  0358   WBC 7.20 6.50 9.09   Hemoglobin 10.1 L 9.9 L 9.6 L   Hematocrit 34.3 L 32.4 L 32.0 L   Platelets 184 201 206         CHEMISTRIES:  Recent Labs   Lab 05/12/22  0343 05/14/22  0724 05/17/22  1007 08/24/22  1000 01/15/24  0359 01/16/24  0445 01/17/24  0358   Glucose 110 96 162 H   < > 91 94 154 H   Sodium 135 L 136 133 L   < > 139 140 140   Potassium 4.4 4.1 5.0   < > 3.4 L 4.0 3.5   BUN 24 H 18 15   < > 23 20 20   Creatinine 0.7 0.7 0.7   < > 0.9 0.8 1.0   eGFR if African American >60.0 >60 >60.0  --   --   --    --    eGFR if non African American >60.0 >60 >60.0  --   --   --   --    Calcium 9.1 9.6 9.5   < > 9.5 9.8 9.7   Magnesium 2.0  --  2.0   < > 2.1 1.9 1.9    < > = values in this interval not displayed.         CARDIAC BIOMARKERS:  Recent Labs   Lab 01/11/24  0021 01/11/24  0311 01/11/24  0855   Troponin I 0.393 H 1.011 H 3.344 H         COAGS:  Recent Labs   Lab 04/02/23  0322 11/26/23  0505 01/11/24  0855   INR 1.0 1.1 1.1         LIPIDS/LFTS:  Recent Labs   Lab 08/29/23  1522 10/20/23  0815 01/12/24  0635 01/13/24  0241 01/15/24  0359 01/16/24  0445 01/17/24  0358   Cholesterol 85 L  --  95 L  --   --   --   --    Triglycerides 68  --  60  --   --   --   --    HDL 25 L  --  37 L  --   --   --   --    LDL Cholesterol 46.4 L  --  46.0 L  --   --   --   --    Non-HDL Cholesterol 60  --  58  --   --   --   --    AST 20   < > 55 H   < > 21 18 19   ALT 28   < > 29   < > 21 19 18    < > = values in this interval not displayed.         Hemoglobin A1C   Date Value Ref Range Status   08/29/2023 5.8 (H) 4.0 - 5.6 % Final     Comment:     ADA Screening Guidelines:  5.7-6.4%  Consistent with prediabetes  >or=6.5%  Consistent with diabetes    High levels of fetal hemoglobin interfere with the HbA1C  assay. Heterozygous hemoglobin variants (HbS, HgC, etc)do  not significantly interfere with this assay.   However, presence of multiple variants may affect accuracy.     09/27/2022 6.3 (H) 4.0 - 5.6 % Final     Comment:     ADA Screening Guidelines:  5.7-6.4%  Consistent with prediabetes  >or=6.5%  Consistent with diabetes    High levels of fetal hemoglobin interfere with the HbA1C  assay. Heterozygous hemoglobin variants (HbS, HgC, etc)do  not significantly interfere with this assay.   However, presence of multiple variants may affect accuracy.         TSH  Recent Labs   Lab 05/23/23  0849 07/10/23  1040 08/29/23  1522   TSH 4.379 H 3.610 4.460 H         The ASCVD Risk score (Caitlyn DUPONT, et al., 2019) failed to calculate for the  following reasons:    The patient has a prior MI or stroke diagnosis       BNP    Lab Results   Component Value Date/Time    BNP 2,467 (H) 01/11/2024 12:21 AM    BNP 1,708 (H) 11/20/2023 04:06 PM     (H) 10/20/2023 08:15 AM    BNP 1,185 (H) 04/18/2023 07:07 AM    BNP 2,543 (H) 03/21/2023 03:25 PM     (H) 03/18/2023 11:06 AM     (H) 09/23/2022 12:47 PM     (H) 09/14/2022 04:59 AM    BNP 98 05/14/2022 07:25 AM     (H) 04/22/2022 05:04 PM    BNP 1,607 (H) 04/19/2022 09:29 AM     (H) 03/21/2022 02:39 PM     (H) 11/02/2019 02:52 AM     (H) 12/27/2018 01:29 PM            ECHO    Results for orders placed during the hospital encounter of 01/11/24    Echo    Interpretation Summary    Left Ventricle: The left ventricle is normal in size. Mildly increased wall thickness. There is concentric hypertrophy. Normal wall motion. There is low normal systolic function with a visually estimated ejection fraction of 50 - 55%. Unable to assess diastolic function due to E-A fusion.    Right Ventricle: Normal right ventricular cavity size. Systolic function is normal.    Left Atrium: Left atrium is moderately dilated.    Right Atrium: Right atrium is mildly dilated.    Mitral Valve: There is mild regurgitation.    Tricuspid Valve: There is mild regurgitation.    Pulmonary Artery: The estimated pulmonary artery systolic pressure is 13 mmHg.    IVC/SVC: Normal venous pressure at 3 mmHg.      STRESS TEST    No results found for this or any previous visit.        CATH    Results for orders placed during the hospital encounter of 03/18/23    Cardiac catheterization    Conclusion  Description of the findings of the procedure: uneventful LHC/cor angio/Ao angio/iliac angio/PCI prox LAD BRADLEY x1, PCI mid LCx BRADLEY x1/R rad vasband/RFA man comp.    Findings/Key Components:  LVEDP: 3mmHg  LVEF: 60%    Aortic arch: severe calcific atherosclerosis  Ost innominate artery 80%  Prox R SCA 90%, 53mmHg  gradient across stenoses.  Prox-mid ICA patent  Distal aortoiliac bifurcation with patent stents in ЮЛИЯ bilaterally and possible severe ISR in R ЮЛИЯ.    Severe diffuse cor Ca++  Dominance: Right  LM: MLI  LAD: prox 95% at D1, mid  Ramus: MLI  LCx: mid 90%  RCA: not injected, diffuse disease, severe dist RCA stenosis (see Dr. Benitez cath report    PCI prox LAD:  Preop ASA/Plavix, intraop heparin  Wired LAD/D1  Predil 2.5x12, 2.75x15 NC  Stent 3.0x24 Synergy BRADLEY  Post IVUS with mild underexpansion mid stent  Postdil 3.25x20 NC at 22atm  Excellent angiographic result, T3 flow, 0% residual stenosis    PCI mid LCx  Predil 2.5x12  Stent 2.75x20 Synergy BRADLEY 14 fidelia  Unavble to pass IVUS due to severe prox LCx tortuosity  Excellent angiographic result, T3 flow, 0% residual stenosis    Hemostasis:  R Radial band  RFA man comp  Assessment and Plan:     Brief HPI:     * Acute on chronic respiratory failure with hypoxia        Ventilator associated pneumonia        NSTEMI (non-ST elevated myocardial infarction)  TYPE 1 VERSUS TYPE 2.  PATIENT IS CHEST PAIN-FREE.  STATES THAT PRIOR TO HIS PCI WHEN HE HAD THE NON-STEMI LAST TIME, HE DID HAVE CHEST PAINS.  EKG DOES SHOW ST DEPRESSIONS.  CASE DISCUSSED IN DETAIL WITH THE DR. TAMAYO WHO IS THE PATIENT'S PRIMARY CARDIOLOGIST.  LAST PCI WAS COMPLICATED BY PULMONARY AND RETROPERITONEAL HEMORRHAGE.  WHEN THE PATIENT SAW DR. TAMAYO IN CLINIC AS AN OUTPATIENT, DR. TAMAYO HAD RECOMMENDED PALLIATIVE CARE CONSULT.  TODAY WITH ME AND DR. TAMAYO HAD A DETAILED DISCUSSION AND CONSIDERING HIS POOR STATE OF HEALTH, MULTIPLE COMORBIDITIES, THE FACT THAT HE IS CHEST PAIN-FREE AND THE FACT THAT HE HAD MULTIPLE COMPLICATIONS CHRONIC PULMONARY HEMORRHAGE AS WELL AS RETROPERITONEAL HEMORRHAGE AFTER HIS LAST CATHETERIZATION AND PCI BY DR. WEST, WE RECOMMEND MEDICAL MANAGEMENT AND PALLIATIVE CARE CONSULT AT THE CURRENT TIME.  HE IS CURRENTLY COMFORTABLE AND CHEST PAIN-FREE.  CONTINUE MEDICAL  MANAGEMENT.  IF DEVELOPS LIFESTYLE LIMITING ANGINA, THEN DISCUSSION OF HIGH-RISK PCI WILL BE HELD.      Jan 17:  Patient has been chest pain-free for multiple days now.  Continue medical management.  Will follow p.r.n..  Please consult as needed    PEG (percutaneous endoscopic gastrostomy) status        Tracheostomy present        Severe protein-calorie malnutrition          Other hyperlipidemia        Coronary artery disease involving native coronary artery of native heart without angina pectoris  ALTHOUGH PATIENT'S TROPONINS ELEVATED, HE IS CHEST PAIN-FREE.  STATES THAT PRIOR TO HIS STENTS IN MARCH HE HAD CHEST PAINS AND CURRENTLY IS CHEST PAIN-FREE.  IT IS POSSIBLE THAT HIS TROPONIN COULD BE ELEVATED SECONDARY TO HIS HYPOXEMIA ON PRESENTATION.  HOWEVER DOES HAVE LATERAL ST DEPRESSIONS AND ISCHEMIC LOOKING EKG.    1/12 has continued to be chest pain-free.    Peripheral vascular disease              VTE Risk Mitigation (From admission, onward)           Ordered     heparin 25,000 units in dextrose 5% 250 mL (100 units/mL) infusion LOW INTENSITY nomogram - [facility]  Continuous        Question:  Begin at (units/kg/hr)  Answer:  12    01/13/24 0819     IP VTE LOW RISK PATIENT  Once         01/11/24 1648     Place sequential compression device  Until discontinued         01/11/24 0225                    Arturo Malone MD  Cardiology  [facility] [place] - Intensive Care      Critical Care Time:  35 minutes     Critical care was time spent personally by me on the following activities: development of treatment plan with patient or surrogate and bedside caregivers, discussions with consultants, evaluation of patient's response to treatment, examination of patient, ordering and performing treatments and interventions, ordering and review of laboratory studies, ordering and review of radiographic studies, pulse oximetry, re-evaluation of patient's condition. This critical care time did not overlap with that of any other provider or  involve time for any procedures.

## 2024-01-18 NOTE — ASSESSMENT & PLAN NOTE
TYPE 1 VERSUS TYPE 2.  PATIENT IS CHEST PAIN-FREE.  STATES THAT PRIOR TO HIS PCI WHEN HE HAD THE NON-STEMI LAST TIME, HE DID HAVE CHEST PAINS.  EKG DOES SHOW ST DEPRESSIONS.  CASE DISCUSSED IN DETAIL WITH THE DR. TAMAYO WHO IS THE PATIENT'S PRIMARY CARDIOLOGIST.  LAST PCI WAS COMPLICATED BY PULMONARY AND RETROPERITONEAL HEMORRHAGE.  WHEN THE PATIENT SAW DR. TAMAYO IN CLINIC AS AN OUTPATIENT, DR. TAMAYO HAD RECOMMENDED PALLIATIVE CARE CONSULT.  TODAY WITH ME AND DR. TAMAYO HAD A DETAILED DISCUSSION AND CONSIDERING HIS POOR STATE OF HEALTH, MULTIPLE COMORBIDITIES, THE FACT THAT HE IS CHEST PAIN-FREE AND THE FACT THAT HE HAD MULTIPLE COMPLICATIONS CHRONIC PULMONARY HEMORRHAGE AS WELL AS RETROPERITONEAL HEMORRHAGE AFTER HIS LAST CATHETERIZATION AND PCI BY DR. WEST, WE RECOMMEND MEDICAL MANAGEMENT AND PALLIATIVE CARE CONSULT AT THE CURRENT TIME.  HE IS CURRENTLY COMFORTABLE AND CHEST PAIN-FREE.  CONTINUE MEDICAL MANAGEMENT.  IF DEVELOPS LIFESTYLE LIMITING ANGINA, THEN DISCUSSION OF HIGH-RISK PCI WILL BE HELD.      Jan 17:  Patient has been chest pain-free for multiple days now.  Continue medical management.  Will follow p.r.n..  Please consult as needed

## 2024-01-18 NOTE — PLAN OF CARE
Recommendations    1. Continue Isosource 1.5 @ gr 50 mL/hr providing 1800 kcal, 82 g protein and 917 mL free water meeing 100% EEN/EPN  2. Continue to monitor for tf tolerance and advancements  3. Monitor weights and labs  4. Collaboration with medical providers    Goals: 1. Pt to meet % EEN/EPN by RD follow up  Nutrition Goal Status: continue  Communication of RD Recs:  (POC)    Assessment and Plan    Nutrition Problem  Inadequate oral intake    Related to (etiology):   NPO status    Signs and Symptoms (as evidenced by):   Tube feeds    Interventions/Recommendations (treatment strategy):  Collaboration with medical providers    Nutrition Diagnosis Status:   Revised;continues

## 2024-01-18 NOTE — PROGRESS NOTES
[facility] - Intensive Care  Adult Nutrition  Consult Note    SUMMARY     Recommendations    1. Continue Isosource 1.5 @ gr 50 mL/hr providing 1800 kcal, 82 g protein and 917 mL free water meeing 100% EEN/EPN  2. Continue to monitor for tf tolerance and advancements  3. Monitor weights and labs  4. Collaboration with medical providers    Goals: 1. Pt to meet % EEN/EPN by RD follow up  Nutrition Goal Status: continue  Communication of RD Recs:  (POC)    Assessment and Plan    Nutrition Problem  Inadequate oral intake    Related to (etiology):   NPO status    Signs and Symptoms (as evidenced by):   Tube feeds    Interventions/Recommendations (treatment strategy):  Collaboration with medical providers    Nutrition Diagnosis Status:   Revised;continues    Reason for Assessment    Reason For Assessment: consult  Diagnosis:  (acute on chronic respiratory failure with hypoxia)  Relevant Medical History:   Past Medical History:   Diagnosis Date    Cancer     skin to Rt forearm and face    COPD (chronic obstructive pulmonary disease)     Hyperlipidemia     Hypertension     Pseudoaneurysm     General Information Comments: RD follow up. Pt continues on TF isosource @ 50 mL.hr providing 1800 kcal, 82 g protein and 917 mL free water meeing 100% EEN/EPN. BMI 18.72, normal BMI. No weight changes since previous visit. Per [facility] 1/11/24, pt appears nourished.    RD consult for recs. Spoke with nurse who states patient is ordered isosource 1.5. NKFA. LBM 1/10/24. BMI 19.57, normal BMI. Consider Isosource 1.5 @ gr 50 mL/hr providing 1800 kcal, 82 g protein and 917 mL free water meeing 100% EEN/EPN. Per [facility] 1/11/24, pt appears nourished.   Interdisciplinary Rounds: did not attend  Nutrition Discharge Planning: Pt to tolerate tube feed    Nutrition Risk Screen    Nutrition Risk Screen: no indicators present    Nutrition/Diet History    Food Allergies: NKFA    Anthropometrics    Temp: 98 °F (36.7 °C)  Height Method:  "Stated  Height: 5' 9" (175.3 cm)  Height (inches): 69 in  Weight Method: Bed Scale  Weight: 57.5 kg (126 lb 12.2 oz)  Weight (lb): 126.77 lb  Ideal Body Weight (IBW), Male: 160 lb  % Ideal Body Weight, Male (lb): 82.5 %  BMI (Calculated): 18.7  BMI Grade: 18.5-24.9 - normal       Lab/Procedures/Meds    Pertinent Labs Reviewed: reviewed  BMP  Lab Results   Component Value Date     2024    K 3.3 (L) 2024    CL 97 2024    CO2 32 (H) 2024    BUN 19 2024    CREATININE 0.9 2024    CALCIUM 9.6 2024    ANIONGAP 13 2024    EGFRNORACEVR >60 2024      Pertinent Medications Reviewed: reviewed  Current Outpatient Medications   Medication Instructions    albuterol-ipratropium (DUO-NEB) 2.5 mg-0.5 mg/3 mL nebulizer solution 3 mLs, Nebulization, Every 4 hours PRN, Rescue    ascorbic acid (vitamin C) (VITAMIN C) 100 mg, Oral, Daily    aspirin 81 mg, Oral, Daily    atorvastatin (LIPITOR) 40 mg, Per G Tube, Nightly    bisacodyL (DULCOLAX) 10 mg, Rectal, Daily PRN    clopidogreL (PLAVIX) 75 mg, Oral, Daily    ferrous sulfate 325 mg, Oral, 3 times daily with meals    FLUoxetine (PROZAC) 20 mg, Oral, Daily    folic acid (FOLVITE) 1 mg, Oral, Daily    furosemide (LASIX) 20 mg, Oral, Daily    glycopyrrolate (CUVPOSA) 1 mg/5 mL (0.2 mg/mL) Soln Take 5 mLs (1 mg total) by mouth 3 (three) times daily as needed (TO REDUCE SECRETIONS).    hydrOXYzine HCL (ATARAX) 25 MG tablet SMARTSI.5 Tablet(s) Gastro Tube Every 8 Hours PRN    melatonin (MELATIN) 3 mg, Per G Tube, Nightly    metoprolol tartrate (LOPRESSOR) 12.5 mg, Oral, 2 times daily    omeprazole (PRILOSEC) 40 MG capsule Take 40 mg (contents of one capsule) twice daily through PEG tube. Mix contents of capsule with 10 mL applesauce.    polyethylene glycol (GLYCOLAX) 17 g, Per G Tube, 2 times daily    sodium chloride 3% 3 % nebulizer solution 4 mLs, Nebulization, 2 times daily    thiamine (VITAMIN B-1) 50 mg, Oral, Daily    WIXELA " INHUB 250-50 mcg/dose diskus inhaler SMARTSI Puff(s) By Mouth Every 12 Hours        Estimated/Assessed Needs    Weight Used For Calorie Calculations: 59.9 kg (132 lb)  Energy Calorie Requirements (kcal): 1727 kcal  Energy Need Method: [place]-St Jeor  Protein Requirements: 60 g  Weight Used For Protein Calculations: 59.9 kg (132 lb)     Estimated Fluid Requirement Method: RDA Method  RDA Method (mL): 1727         Nutrition Prescription Ordered    Current Diet Order: NPO    Evaluation of Received Nutrient/Fluid Intake    I/O: +1 L  Energy Calories Required: not meeting needs  Protein Required: not meeting needs  Fluid Required: not meeting needs  Comments: LBM 1/10/24  % Intake of Estimated Energy Needs: 0 - 25 %  % Meal Intake: NPO    Nutrition Risk    Level of Risk/Frequency of Follow-up: moderate - high       Monitor and Evaluation    Food and Nutrient Intake: enteral nutrition intake, parenteral nutrition intake  Food and Nutrient Adminstration: enteral and parenteral nutrition administration  Knowledge/Beliefs/Attitudes: food and nutrition knowledge/skill, beliefs and attitudes  Physical Activity and Function: nutrition-related ADLs and IADLs, factors affecting access to physical activity  Anthropometric Measurements: weight, weight change, body mass index  Biochemical Data, Medical Tests and Procedures: inflammatory profile  Nutrition-Focused Physical Findings: overall appearance       Nutrition Follow-Up    RD Follow-up?: Yes    Ambar Dutta, Registration Eligible, provisional LDN

## 2024-01-18 NOTE — SUBJECTIVE & OBJECTIVE
Interval History:Has Afib with RVR,on floor,was send to ICU on amiodarone gtt,HR is improved.    Review of Systems  Objective:     Vital Signs (Most Recent):  Temp: 98 °F (36.7 °C) (01/18/24 0800)  Pulse: 82 (01/18/24 1000)  Resp: (!) 29 (01/18/24 1000)  BP: 99/67 (01/18/24 1000)  SpO2: 100 % (01/18/24 1000) Vital Signs (24h Range):  Temp:  [97.7 °F (36.5 °C)-98.5 °F (36.9 °C)] 98 °F (36.7 °C)  Pulse:  [] 82  Resp:  [20-39] 29  SpO2:  [89 %-100 %] 100 %  BP: ()/(61-81) 99/67     Weight: 57.5 kg (126 lb 12.2 oz)  Body mass index is 18.72 kg/m².    Intake/Output Summary (Last 24 hours) at 1/18/2024 1104  Last data filed at 1/18/2024 0525  Gross per 24 hour   Intake 619.26 ml   Output 500 ml   Net 119.26 ml           Physical Exam  Vitals reviewed.   Constitutional:       General: He is not in acute distress.     Appearance: He is ill-appearing.   HENT:      Head: Normocephalic and atraumatic.      Mouth/Throat:      Mouth: Mucous membranes are dry.   Eyes:      General: No scleral icterus.     Extraocular Movements: Extraocular movements intact.   Cardiovascular:      Rate and Rhythm: Normal rate. Rhythm irregular.   Pulmonary:      Effort: Pulmonary effort is normal. Respiratory distress: course respirations.   Abdominal:      Palpations: Abdomen is soft.      Tenderness: There is no abdominal tenderness.   Musculoskeletal:         General: No swelling or tenderness.      Cervical back: Neck supple. No rigidity.   Skin:     General: Skin is warm and dry.   Neurological:      General: No focal deficit present.      Mental Status: He is alert and oriented to person, place, and time.   Psychiatric:         Mood and Affect: Mood normal.         Behavior: Behavior normal.             Significant Labs: All pertinent labs within the past 24 hours have been reviewed.    Significant Imaging: I have reviewed all pertinent imaging results/findings within the past 24 hours.

## 2024-01-18 NOTE — PROGRESS NOTES
[facility] Medicine  Progress Note    Patient Name: Fareed Richard Jr.  MRN: [id number]  Patient Class: IP- Inpatient   Admission Date: 1/11/2024  Length of Stay: 7 days  Attending Physician: aSntos Marlow, *  Primary Care Provider: Kodi Tubbs MD        Subjective:     Principal Problem:Acute on chronic respiratory failure with hypoxia        HPI:  Mr Fareed Richard Jr. 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 He is on 6L trach collar at home and receives tube feeding. He had shortness of breath, fever, and increased trach output. Denies chest pain.     In ED, he was tachycardic and tachypneic with SpO2 85% on 8L. He was given albuterol, vanc/zosyn, solumedrol. He was admitted to ICU.     Overview/Hospital Course:  Mr Fareed Richard Jr. is a 73 y.o. man who was admitted with acute on chronic hypoxic respiratory failure due to pneumonia, suspect recurrent Pseudomonas. Also with NSTEMI on admit in setting of known CAD. Started vanc, zosyn. Weaned O2. Cardiology consulted- due to severe CAD and complications with last angiogram <1 year ago, will plan medical management with heparin gtt, asa, plavix, and will NOT pursue ischemic evaluation. He is improving. Stepped down to floor. Cultures growing pseudomonas in sputum. On zosyn. Vancomycin stopped. ID consulted,continue with zosyn,on 40% FIO2.  Medical treat ment for NSTEMI per cardiology.  Has Afib with RVR,on floor,was send to ICU on amiodarone gtt,HR is improved.    Interval History:Has Afib with RVR,on floor,was send to ICU on amiodarone gtt,HR is improved.    Review of Systems  Objective:     Vital Signs (Most Recent):  Temp: 98 °F (36.7 °C) (01/18/24 0800)  Pulse: 82 (01/18/24 1000)  Resp: (!) 29 (01/18/24 1000)  BP: 99/67 (01/18/24 1000)  SpO2: 100 % (01/18/24 1000) Vital Signs  (24h Range):  Temp:  [97.7 °F (36.5 °C)-98.5 °F (36.9 °C)] 98 °F (36.7 °C)  Pulse:  [] 82  Resp:  [20-39] 29  SpO2:  [89 %-100 %] 100 %  BP: ()/(61-81) 99/67     Weight: 57.5 kg (126 lb 12.2 oz)  Body mass index is 18.72 kg/m².    Intake/Output Summary (Last 24 hours) at 1/18/2024 1104  Last data filed at 1/18/2024 0525  Gross per 24 hour   Intake 619.26 ml   Output 500 ml   Net 119.26 ml           Physical Exam  Vitals reviewed.   Constitutional:       General: He is not in acute distress.     Appearance: He is ill-appearing.   HENT:      Head: Normocephalic and atraumatic.      Mouth/Throat:      Mouth: Mucous membranes are dry.   Eyes:      General: No scleral icterus.     Extraocular Movements: Extraocular movements intact.   Cardiovascular:      Rate and Rhythm: Normal rate. Rhythm irregular.   Pulmonary:      Effort: Pulmonary effort is normal. Respiratory distress: course respirations.   Abdominal:      Palpations: Abdomen is soft.      Tenderness: There is no abdominal tenderness.   Musculoskeletal:         General: No swelling or tenderness.      Cervical back: Neck supple. No rigidity.   Skin:     General: Skin is warm and dry.   Neurological:      General: No focal deficit present.      Mental Status: He is alert and oriented to person, place, and time.   Psychiatric:         Mood and Affect: Mood normal.         Behavior: Behavior normal.             Significant Labs: All pertinent labs within the past 24 hours have been reviewed.    Significant Imaging: I have reviewed all pertinent imaging results/findings within the past 24 hours.    Assessment/Plan:      * Acute on chronic respiratory failure with hypoxia  Patient admitted with Hypoxic which is Acute on Chronic.  he is on home oxygen at 6 LPM trach [facility]. Signs/symptoms of respiratory failure include- increased work of breathing and respiratory distress present on admission.   - Labs and images were reviewed. Patient Has not has a recent  ABG.   - Supplemental oxygen was provided and noted-  trach collar    - Respiratory failure is due to- CHF and Pneumonia   - CT with RML PNA. Trach aspirate suspected Pseudomonas. Continue vanc, zosyn for now- likely can drop vanc tomorrow   - BNP higher than ever before. TTE normal EF. NSTEMI present. Continue lasix 40mg IV daily- can likely change to PO tomorrow  - CTA no PE  - improving from admit  Consulted ID for Abx management. Continuing with Zosyn  Still on FIO2 at 40%,continue with HFO and frequent suctioning,pulmonology is on case,        Atrial fibrillation with RVR  Has Afib with RVR,on floor,was send to ICU on amiodarone gtt,HR is improved.    Ventilator associated pneumonia  RML infiltrate  Trach aspirate with suspect Pseudomonas  - continue zosyn. ID consulted. Vancomycin stopped        Normocytic anemia  Patient's anemia is currently controlled. Has not received any PRBCs to date. Etiology likely d/t  chronic disease  Current CBC reviewed-   Lab Results   Component Value Date    HGB 9.9 (L) 01/16/2024    HCT 32.4 (L) 01/16/2024     Monitor serial CBC and transfuse if patient becomes hemodynamically unstable, symptomatic or H/H drops below 7/21.    Acute on chronic diastolic heart failure  Patient admitted with shortness of breath. His BNP is higher than ever before despite him appearing euvolemic.      BNP  Recent Labs   Lab 01/11/24  0021   BNP 2,467*       CT RML infiltrate  Recent Labs   Lab 01/11/24  0855   TROPONINI 3.344*       EKG not able to see in MUSE- will need to find. Per Cardiology, no STEMI    Continue 40mg IV lasix QD. Monitor UOP. Likely change to PO lasix tomorrow  TTE normal EF, potential diastolic dysfunction  Cardiology consulted for NSTEMI- no ischemic eval planned         NSTEMI (non-ST elevated myocardial infarction)  Admitted with NSTEMI. No chest pain.  Recent Labs   Lab 01/11/24  0855   TROPONINI 3.344*       EKG with ischemic changes   He has know CAD  Started asa, plavix,  heparin gtt, statin.   TTE EF 50-55%    Medical treat ment for NSTEMI per cardiology.        PEG (percutaneous endoscopic gastrostomy) status  Patient noted to have a percutaneous endoscopic gastrostomy tube in place. I have personally inspected the tube.Tube was placed prior to this admission There are no signs of drainage or infection around the site. The tube is patent. Medications have converted to liquid form if available.  Routine care to be done by wound care and nursing staff.   - resumed tube feeds         Tracheostomy present  Noted      ACP (advance care planning)  Advance Care Planning    Date: 01/11/2024  Full code status          Severe protein-calorie malnutrition  Nutrition consulted. Most recent weight and BMI monitored-     Measurements:  Wt Readings from Last 1 Encounters:   01/16/24 58.6 kg (129 lb 3 oz)   Body mass index is 19.08 kg/m².    Patient has been screened and assessed by RD.    Interventions/Recommendations (treatment strategy):     - continue tube feeds    Secondary malignant neoplasm of cervical lymph node     Cancer Staging   Squamous cell cancer of tongue  Staging form: Oral Cavity, AJCC 8th Edition  - Clinical stage from 1/2/2022: Stage NAFISA (cT2, cN2a, cM0) - Signed by Naeem Smalls MD on 1/2/2022  - Pathologic stage from 1/4/2022: Stage IVB (pT4a, pN3b, cM0) - Signed by Christopher Jay MD on 2/16/2022        COPD exacerbation   On nebulizer     Other hyperlipidemia  Continue statin      Primary hypertension  Chronic, controlled. Latest blood pressure and vitals reviewed-     Temp:  [97.7 °F (36.5 °C)-98.4 °F (36.9 °C)]   Pulse:  []   Resp:  [18-38]   BP: ()/(57-83)   SpO2:  [90 %-100 %] .   Home meds for hypertension were reviewed and noted below.   Hypertension Medications               furosemide (LASIX) 40 MG tablet Take 0.5 tablets (20 mg total) by mouth once daily.    metoprolol tartrate (LOPRESSOR) 25 MG tablet Take 0.5 tablets (12.5 mg total) by mouth 2  (two) times daily.          - hold BP Rx because BP is borderline low     Will utilize p.r.n. blood pressure medication only if patient's blood pressure greater than 180/110 and he develops symptoms such as worsening chest pain or shortness of breath.    Coronary artery disease involving native coronary artery of native heart without angina pectoris  Patient with known CAD s/p stent placement, which is uncontrolled Will continue ASA, Plavix, and Statin and monitor for S/Sx of angina/ACS. Continue to monitor on telemetry.   - see NSTEMI    Peripheral vascular disease  Known PAD from last angiogram 3/2023  Continue asa, plavix, statin         VTE Risk Mitigation (From admission, onward)           Ordered     apixaban tablet 2.5 mg  2 times daily         01/18/24 0815     heparin 25,000 units in dextrose 5% 250 mL (100 units/mL) infusion LOW INTENSITY nomogram - [facility]  Continuous        Question:  Begin at (units/kg/hr)  Answer:  12    01/13/24 0819     IP VTE LOW RISK PATIENT  Once         01/11/24 1648     Place sequential compression device  Until discontinued         01/11/24 0225                    Discharge Planning   NISA: 1/19/2024     Code Status: Full Code   Is the patient medically ready for discharge?:     Reason for patient still in hospital (select all that apply): Patient trending condition  Discharge Plan A: Home Health   Discharge Delays: None known at this time        Critical care time spent on the evaluation and treatment of severe organ dysfunction, review of pertinent labs and imaging studies, discussions with consulting providers and discussions with patient/family:  over 45  minutes.      Santos Marlow MD  Department of Hospital Medicine   [facility] - Intensive Care

## 2024-01-19 LAB
ALBUMIN SERPL BCP-MCNC: 2.5 G/DL (ref 3.5–5.2)
ALP SERPL-CCNC: 82 U/L (ref 55–135)
ALT SERPL W/O P-5'-P-CCNC: 29 U/L (ref 10–44)
ANION GAP SERPL CALC-SCNC: 8 MMOL/L (ref 8–16)
AST SERPL-CCNC: 40 U/L (ref 10–40)
BASOPHILS # BLD AUTO: 0.03 K/UL (ref 0–0.2)
BASOPHILS NFR BLD: 0.5 % (ref 0–1.9)
BILIRUB SERPL-MCNC: 0.3 MG/DL (ref 0.1–1)
BUN SERPL-MCNC: 27 MG/DL (ref 8–23)
CALCIUM SERPL-MCNC: 9.8 MG/DL (ref 8.7–10.5)
CHLORIDE SERPL-SCNC: 99 MMOL/L (ref 95–110)
CO2 SERPL-SCNC: 34 MMOL/L (ref 23–29)
CREAT SERPL-MCNC: 1 MG/DL (ref 0.5–1.4)
DIFFERENTIAL METHOD BLD: ABNORMAL
EOSINOPHIL # BLD AUTO: 0 K/UL (ref 0–0.5)
EOSINOPHIL NFR BLD: 0.3 % (ref 0–8)
ERYTHROCYTE [DISTWIDTH] IN BLOOD BY AUTOMATED COUNT: 14.6 % (ref 11.5–14.5)
EST. GFR  (NO RACE VARIABLE): >60 ML/MIN/1.73 M^2
GLUCOSE SERPL-MCNC: 171 MG/DL (ref 70–110)
HCT VFR BLD AUTO: 32.3 % (ref 40–54)
HGB BLD-MCNC: 9.3 G/DL (ref 14–18)
IMM GRANULOCYTES # BLD AUTO: 0.02 K/UL (ref 0–0.04)
IMM GRANULOCYTES NFR BLD AUTO: 0.3 % (ref 0–0.5)
LYMPHOCYTES # BLD AUTO: 0.4 K/UL (ref 1–4.8)
LYMPHOCYTES NFR BLD: 6.2 % (ref 18–48)
MAGNESIUM SERPL-MCNC: 2 MG/DL (ref 1.6–2.6)
MCH RBC QN AUTO: 28.2 PG (ref 27–31)
MCHC RBC AUTO-ENTMCNC: 28.8 G/DL (ref 32–36)
MCV RBC AUTO: 98 FL (ref 82–98)
MONOCYTES # BLD AUTO: 0.6 K/UL (ref 0.3–1)
MONOCYTES NFR BLD: 9.4 % (ref 4–15)
NEUTROPHILS # BLD AUTO: 5.5 K/UL (ref 1.8–7.7)
NEUTROPHILS NFR BLD: 83.3 % (ref 38–73)
NRBC BLD-RTO: 0 /100 WBC
PLATELET # BLD AUTO: 212 K/UL (ref 150–450)
PMV BLD AUTO: 10 FL (ref 9.2–12.9)
POCT GLUCOSE: 171 MG/DL (ref 70–110)
POCT GLUCOSE: 178 MG/DL (ref 70–110)
POTASSIUM SERPL-SCNC: 3.7 MMOL/L (ref 3.5–5.1)
PROT SERPL-MCNC: 6.6 G/DL (ref 6–8.4)
RBC # BLD AUTO: 3.3 M/UL (ref 4.6–6.2)
SODIUM SERPL-SCNC: 141 MMOL/L (ref 136–145)
WBC # BLD AUTO: 6.61 K/UL (ref 3.9–12.7)

## 2024-01-19 PROCEDURE — 36415 COLL VENOUS BLD VENIPUNCTURE: CPT | Performed by: HOSPITALIST

## 2024-01-19 PROCEDURE — 83735 ASSAY OF MAGNESIUM: CPT | Performed by: HOSPITALIST

## 2024-01-19 PROCEDURE — 63600175 PHARM REV CODE 636 W HCPCS: Performed by: STUDENT IN AN ORGANIZED HEALTH CARE EDUCATION/TRAINING PROGRAM

## 2024-01-19 PROCEDURE — 99900026 HC AIRWAY MAINTENANCE (STAT)

## 2024-01-19 PROCEDURE — 25000003 PHARM REV CODE 250: Performed by: STUDENT IN AN ORGANIZED HEALTH CARE EDUCATION/TRAINING PROGRAM

## 2024-01-19 PROCEDURE — 25000242 PHARM REV CODE 250 ALT 637 W/ HCPCS: Performed by: INTERNAL MEDICINE

## 2024-01-19 PROCEDURE — 63600175 PHARM REV CODE 636 W HCPCS: Performed by: INTERNAL MEDICINE

## 2024-01-19 PROCEDURE — 94660 CPAP INITIATION&MGMT: CPT

## 2024-01-19 PROCEDURE — 99291 CRITICAL CARE FIRST HOUR: CPT | Mod: ,,, | Performed by: INTERNAL MEDICINE

## 2024-01-19 PROCEDURE — 94667 MNPJ CHEST WALL 1ST: CPT

## 2024-01-19 PROCEDURE — 25000003 PHARM REV CODE 250: Performed by: INTERNAL MEDICINE

## 2024-01-19 PROCEDURE — 99900035 HC TECH TIME PER 15 MIN (STAT)

## 2024-01-19 PROCEDURE — 27000190 HC CPAP FULL FACE MASK W/VALVE

## 2024-01-19 PROCEDURE — 20000000 HC ICU ROOM

## 2024-01-19 PROCEDURE — 25000242 PHARM REV CODE 250 ALT 637 W/ HCPCS: Performed by: STUDENT IN AN ORGANIZED HEALTH CARE EDUCATION/TRAINING PROGRAM

## 2024-01-19 PROCEDURE — 25000242 PHARM REV CODE 250 ALT 637 W/ HCPCS: Performed by: HOSPITALIST

## 2024-01-19 PROCEDURE — 80053 COMPREHEN METABOLIC PANEL: CPT | Performed by: HOSPITALIST

## 2024-01-19 PROCEDURE — 87205 SMEAR GRAM STAIN: CPT | Performed by: INTERNAL MEDICINE

## 2024-01-19 PROCEDURE — 87070 CULTURE OTHR SPECIMN AEROBIC: CPT | Performed by: INTERNAL MEDICINE

## 2024-01-19 PROCEDURE — 25000003 PHARM REV CODE 250: Performed by: HOSPITALIST

## 2024-01-19 PROCEDURE — 85025 COMPLETE CBC W/AUTO DIFF WBC: CPT | Performed by: HOSPITALIST

## 2024-01-19 PROCEDURE — 94761 N-INVAS EAR/PLS OXIMETRY MLT: CPT

## 2024-01-19 PROCEDURE — 94640 AIRWAY INHALATION TREATMENT: CPT

## 2024-01-19 PROCEDURE — 27000221 HC OXYGEN, UP TO 24 HOURS

## 2024-01-19 PROCEDURE — 87077 CULTURE AEROBIC IDENTIFY: CPT | Performed by: INTERNAL MEDICINE

## 2024-01-19 PROCEDURE — 87186 SC STD MICRODIL/AGAR DIL: CPT | Performed by: INTERNAL MEDICINE

## 2024-01-19 RX ORDER — ALPRAZOLAM 0.5 MG/1
0.5 TABLET ORAL 3 TIMES DAILY PRN
Status: DISCONTINUED | OUTPATIENT
Start: 2024-01-19 | End: 2024-01-25 | Stop reason: HOSPADM

## 2024-01-19 RX ORDER — TOBRAMYCIN INHALATION SOLUTION 300 MG/5ML
300 INHALANT RESPIRATORY (INHALATION) EVERY 12 HOURS
Status: DISCONTINUED | OUTPATIENT
Start: 2024-01-19 | End: 2024-01-21

## 2024-01-19 RX ORDER — LEVALBUTEROL 1.25 MG/.5ML
1.25 SOLUTION, CONCENTRATE RESPIRATORY (INHALATION)
Status: DISCONTINUED | OUTPATIENT
Start: 2024-01-19 | End: 2024-01-25 | Stop reason: HOSPADM

## 2024-01-19 RX ORDER — ACETYLCYSTEINE 100 MG/ML
4 SOLUTION ORAL; RESPIRATORY (INHALATION)
Status: DISCONTINUED | OUTPATIENT
Start: 2024-01-19 | End: 2024-01-25 | Stop reason: HOSPADM

## 2024-01-19 RX ORDER — AMIODARONE HYDROCHLORIDE 200 MG/1
200 TABLET ORAL 2 TIMES DAILY
Status: DISCONTINUED | OUTPATIENT
Start: 2024-01-19 | End: 2024-01-21

## 2024-01-19 RX ORDER — IPRATROPIUM BROMIDE AND ALBUTEROL SULFATE 2.5; .5 MG/3ML; MG/3ML
3 SOLUTION RESPIRATORY (INHALATION) EVERY 4 HOURS PRN
Status: DISCONTINUED | OUTPATIENT
Start: 2024-01-19 | End: 2024-01-25 | Stop reason: HOSPADM

## 2024-01-19 RX ADMIN — APIXABAN 2.5 MG: 2.5 TABLET, FILM COATED ORAL at 10:01

## 2024-01-19 RX ADMIN — HYDROXYZINE HYDROCHLORIDE 25 MG: 25 TABLET ORAL at 07:01

## 2024-01-19 RX ADMIN — ACETYLCYSTEINE 4 ML: 100 INHALANT RESPIRATORY (INHALATION) at 02:01

## 2024-01-19 RX ADMIN — METOPROLOL TARTRATE 25 MG: 25 TABLET, FILM COATED ORAL at 10:01

## 2024-01-19 RX ADMIN — ALPRAZOLAM 0.5 MG: 0.5 TABLET ORAL at 08:01

## 2024-01-19 RX ADMIN — LEVALBUTEROL 1.25 MG: 1.25 SOLUTION, CONCENTRATE RESPIRATORY (INHALATION) at 02:01

## 2024-01-19 RX ADMIN — FLUOXETINE HYDROCHLORIDE 20 MG: 20 SOLUTION ORAL at 10:01

## 2024-01-19 RX ADMIN — ACETYLCYSTEINE 4 ML: 100 INHALANT RESPIRATORY (INHALATION) at 10:01

## 2024-01-19 RX ADMIN — APIXABAN 2.5 MG: 2.5 TABLET, FILM COATED ORAL at 08:01

## 2024-01-19 RX ADMIN — SODIUM CHLORIDE SOLN NEBU 3% 4 ML: 3 NEBU SOLN at 08:01

## 2024-01-19 RX ADMIN — MELATONIN TAB 3 MG 6 MG: 3 TAB at 08:01

## 2024-01-19 RX ADMIN — TOBRAMYCIN 300 MG: 300 SOLUTION RESPIRATORY (INHALATION) at 10:01

## 2024-01-19 RX ADMIN — ASPIRIN 81 MG CHEWABLE TABLET 81 MG: 81 TABLET CHEWABLE at 10:01

## 2024-01-19 RX ADMIN — AMIODARONE HYDROCHLORIDE 0.5 MG/MIN: 1.8 INJECTION, SOLUTION INTRAVENOUS at 06:01

## 2024-01-19 RX ADMIN — ATORVASTATIN CALCIUM 40 MG: 40 TABLET, FILM COATED ORAL at 10:01

## 2024-01-19 RX ADMIN — IPRATROPIUM BROMIDE AND ALBUTEROL SULFATE 3 ML: 2.5; .5 SOLUTION RESPIRATORY (INHALATION) at 01:01

## 2024-01-19 RX ADMIN — AMIODARONE HYDROCHLORIDE 200 MG: 200 TABLET ORAL at 08:01

## 2024-01-19 RX ADMIN — METOPROLOL TARTRATE 25 MG: 25 TABLET, FILM COATED ORAL at 08:01

## 2024-01-19 RX ADMIN — PIPERACILLIN AND TAZOBACTAM 4.5 G: 4; .5 INJECTION, POWDER, LYOPHILIZED, FOR SOLUTION INTRAVENOUS; PARENTERAL at 05:01

## 2024-01-19 RX ADMIN — ACETAMINOPHEN 650 MG: 325 TABLET ORAL at 07:01

## 2024-01-19 RX ADMIN — ALPRAZOLAM 0.5 MG: 0.5 TABLET ORAL at 01:01

## 2024-01-19 RX ADMIN — FUROSEMIDE 20 MG: 20 TABLET ORAL at 10:01

## 2024-01-19 RX ADMIN — CLOPIDOGREL BISULFATE 75 MG: 75 TABLET ORAL at 08:01

## 2024-01-19 RX ADMIN — FOLIC ACID 1 MG: 1 TABLET ORAL at 10:01

## 2024-01-19 RX ADMIN — LEVALBUTEROL 1.25 MG: 1.25 SOLUTION, CONCENTRATE RESPIRATORY (INHALATION) at 10:01

## 2024-01-19 RX ADMIN — LEVALBUTEROL 1.25 MG: 1.25 SOLUTION, CONCENTRATE RESPIRATORY (INHALATION) at 08:01

## 2024-01-19 NOTE — NURSING
[facility] [place]  Nurses Note -- 4 Eyes      1/19/2024       Skin assessed on: Q Shift      [x] No Pressure Injuries Present    [x]Prevention Measures Documented    [] Yes LDA  for Pressure Injury Previously documented     [] Yes New Pressure Injury Discovered   [] LDA for New Pressure Injury Added      Attending RN:  Yadi Fenton RN     Second RN:  KAMILAH Streeter

## 2024-01-19 NOTE — CARE UPDATE
Patient has a size 6 uncuffed Shiley is on 10 lpm 60% ATC , patient has some distress, but actively calming, will continue to monitor

## 2024-01-19 NOTE — EICU
EICU FOLLOWUP NOTE:    Called for:  desaturation    CAMERA ASSESSMENT: Two way audiovisual assessment was done: no distress    Telemetry was reviewed. Medical records including notes, labs and imaging were reviewed.    DISCUSSED with bedside nurse     ASSESSMENT AND PLAN:    Acute on chronic respiratory failure, status post tracheostomy, on 80% FiO2.  Desaturated into the 70s.  Suctioned.  No obstruction.  Saturation slowly coming up.  No evidence of respiratory distress.  Currently been treated for pneumonia.  Check ABG, chest x-ray, increase FiO2 to 100%.  May have to be placed back on ventilator.  However, his tracheostomy tube is cuffless, will have to change to cuffed endotracheal tube before starting mechanical ventilation.  Reassess once chest x-ray/ABG done    Thank You for allowing EICU to participate in the care of the patient. Please call as needed    Jose Alberto Zapien MD  [facility]  [phone]

## 2024-01-19 NOTE — PLAN OF CARE
Problem: Adult Inpatient Plan of Care  Goal: Plan of Care Review  Outcome: Ongoing, Progressing  Goal: Patient-Specific Goal (Individualized)  Outcome: Ongoing, Progressing  Goal: Absence of Hospital-Acquired Illness or Injury  Outcome: Ongoing, Progressing  Goal: Optimal Comfort and Wellbeing  Outcome: Ongoing, Progressing  Goal: Readiness for Transition of Care  Outcome: Ongoing, Progressing     Problem: Skin Injury Risk Increased  Goal: Skin Health and Integrity  Outcome: Ongoing, Progressing     Problem: Fluid Imbalance (Pneumonia)  Goal: Fluid Balance  Outcome: Ongoing, Progressing     Problem: Respiratory Compromise (Pneumonia)  Goal: Effective Oxygenation and Ventilation  Outcome: Ongoing, Progressing

## 2024-01-19 NOTE — HPI
Mr. Richard is very well known to our team. 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 He was admitted a week ago with a new infection and completed antibiotic therapy on 1/17, however throughout the course of today he has had worsening secretions and increasing oxygen requirements. He has not had a fever but he feels like he does when his secretions are thick again.

## 2024-01-19 NOTE — ASSESSMENT & PLAN NOTE
Has Afib with RVR,on floor,was send to ICU on amiodarone gtt,HR is improved..on maintains amio gtt and eliquis,cardiology is following.

## 2024-01-19 NOTE — ASSESSMENT & PLAN NOTE
Went into AFib with RVR today a.m..  On amiodarone drip, converted back into sinus rhythm.  Eliquis.  Not a good candidate for long-term amiodarone considering his respiratory status

## 2024-01-19 NOTE — SUBJECTIVE & OBJECTIVE
Interval History:  Went back into AFib today.  Converted to sinus rhythm on amiodarone drip in ICU.  Currently on amiodarone drip.  Denies chest pains        Past Medical History:   Diagnosis Date    Cancer     skin to Rt forearm and face    COPD (chronic obstructive pulmonary disease)     Hyperlipidemia     Hypertension     Pseudoaneurysm        Past Surgical History:   Procedure Laterality Date    ABDOMINAL SURGERY      stents placed in liver and large intestines, per patient    ANGIOGRAPHY OF AORTIC ARCH  3/27/2023    Procedure: Aortogram, Aortic Arch;  Surgeon: Tim Bhatt MD;  Location: [facility] CATH LAB;  Service: Cardiology;;    AORTOGRAPHY WITH SERIALOGRAPHY N/A 3/27/2023    Procedure: AORTOGRAM, WITH SERIALOGRAPHY;  Surgeon: Tim Bhatt MD;  Location: [facility] CATH LAB;  Service: Cardiology;  Laterality: N/A;    CAROTID STENT      COLONOSCOPY N/A 09/27/2022    Procedure: COLONOSCOPY;  Surgeon: Donnie Peterson MD;  Location: [facility] ENDO (2ND FLR);  Service: Endoscopy;  Laterality: N/A;    COLONOSCOPY N/A 09/30/2022    Procedure: COLONOSCOPY;  Surgeon: Joy Cabrera MD;  Location: [facility] ENDO (2ND FLR);  Service: Endoscopy;  Laterality: N/A;    CORONARY STENT PLACEMENT  01/2000    DISSECTION OF NECK Bilateral 01/04/2022    Procedure: DISSECTION, NECK;  Surgeon: Naeem Smalls MD;  [facility];  Service: ENT;  Laterality: Bilateral;    ESOPHAGOGASTRODUODENOSCOPY N/A 09/27/2022    Procedure: EGD (ESOPHAGOGASTRODUODENOSCOPY);  Surgeon: Donnie Peterson MD;  Location: [facility] ENDO (2ND FLR);  Service: Endoscopy;  Laterality: N/A;    ESOPHAGOGASTRODUODENOSCOPY N/A 09/30/2022    Procedure: EGD (ESOPHAGOGASTRODUODENOSCOPY);  Surgeon: Joy Cabrera MD;  Location: [facility] ENDO (2ND FLR);  Service: Endoscopy;  Laterality: N/A;    ESOPHAGOGASTRODUODENOSCOPY W/ PEG N/A 01/04/2022    Procedure: EGD, WITH PEG TUBE INSERTION;  Surgeon: Anthony Calabrese MD;  Location: [facility] OR 2ND FLR;  Service: General;   Laterality: N/A;    EYE SURGERY      Cataract bilateral    femoral stents      bilateral    FLAP PROCEDURE N/A 01/03/2022    Procedure: CREATION, FREE FLAP;  Surgeon: Naeem Smalls MD;  [facility];  Service: ENT;  Laterality: N/A;    FLAP PROCEDURE Right 01/04/2022    Procedure: CREATION, FREE FLAP;  Surgeon: Stacey Conde MD;  [facility];  Service: ENT;  Laterality: Right;  Ischemic start 1203  Ischemic stop 1353    GLOSSECTOMY N/A 01/04/2022    Procedure: GLOSSECTOMY;  Surgeon: Naeem Smalls MD;  [facility];  Service: ENT;  Laterality: N/A;    LEFT HEART CATHETERIZATION Left 3/23/2023    Procedure: Left heart cath;  Surgeon: Tacos Benitez MD;  Location: [facility] CATH LAB;  Service: Cardiology;  Laterality: Left;    PERCUTANEOUS TRANSLUMINAL BALLOON ANGIOPLASTY OF CORONARY ARTERY Left 3/27/2023    Procedure: Angioplasty-coronary;  Surgeon: Tim Bhatt MD;  Location: [facility] CATH LAB;  Service: Cardiology;  Laterality: Left;  not before 9am, R rad access, 6 Fr EBU 3.5 guide    RADICAL NECK DISSECTION Left 01/03/2022    Procedure: DISSECTION, NECK, RADICAL;  Surgeon: Naeem Smalls MD;  Location: [facility] OR [address] [place] [address];  Service: ENT;  Laterality: Left;    SKIN BIOPSY      SKIN CANCER EXCISION      STENT, CORONARY, MULTI VESSEL  3/27/2023    Procedure: Stent, Coronary, Multi Vessel;  Surgeon: Tim Bhatt MD;  Location: [facility] CATH LAB;  Service: Cardiology;;    TRACHEOTOMY N/A 01/04/2022    Procedure: TRACHEOTOMY;  Surgeon: Naeem Smalls MD;  Location: [facility] OR [address] [place] [address];  Service: ENT;  Laterality: N/A;    VASCULAR SURGERY         Review of patient's allergies indicates:   Allergen Reactions    Ativan [lorazepam] Anxiety       No current facility-administered medications on file prior to encounter.     Current Outpatient Medications on File Prior to Encounter   Medication Sig    albuterol-ipratropium (DUO-NEB) 2.5 mg-0.5 mg/3 mL nebulizer  solution Take 3 mLs by nebulization every 4 (four) hours as needed for Wheezing. Rescue    ascorbic acid, vitamin C, (VITAMIN C) 100 MG tablet Take 100 mg by mouth once daily.    aspirin 81 MG Chew Take 1 tablet (81 mg total) by mouth once daily.    atorvastatin (LIPITOR) 40 MG tablet 1 tablet (40 mg total) by Per G Tube route every evening. (Patient taking differently: 40 mg by Per G Tube route once daily.)    bisacodyL (DULCOLAX) 10 mg Supp Place 1 suppository (10 mg total) rectally daily as needed.    clopidogreL (PLAVIX) 75 mg tablet Take 1 tablet (75 mg total) by mouth once daily. (Patient taking differently: Take 75 mg by mouth nightly.)    ferrous sulfate 325 (65 FE) MG EC tablet Take 325 mg by mouth 3 (three) times daily with meals.    FLUoxetine (PROZAC) 20 mg/5 mL (4 mg/mL) solution Take 20 mg by mouth once daily.    folic acid (FOLVITE) 1 MG tablet Take 1 mg by mouth once daily.    furosemide (LASIX) 40 MG tablet Take 0.5 tablets (20 mg total) by mouth once daily. (Patient taking differently: Take 20 mg by mouth nightly.)    glycopyrrolate (CUVPOSA) 1 mg/5 mL (0.2 mg/mL) Soln Take 5 mLs (1 mg total) by mouth 3 (three) times daily as needed (TO REDUCE SECRETIONS).    hydrOXYzine HCL (ATARAX) 25 MG tablet SMARTSI.5 Tablet(s) Gastro Tube Every 8 Hours PRN    melatonin (MELATIN) 3 mg tablet 1 tablet (3 mg total) by Per G Tube route nightly.    metoprolol tartrate (LOPRESSOR) 25 MG tablet Take 0.5 tablets (12.5 mg total) by mouth 2 (two) times daily.    omeprazole (PRILOSEC) 40 MG capsule Take 40 mg (contents of one capsule) twice daily through PEG tube. Mix contents of capsule with 10 mL applesauce. (Patient taking differently: 40 mg 2 (two) times a day. Take 40 mg (contents of one capsule) twice daily through PEG tube. Mix contents of capsule with 10 mL applesauce.)    polyethylene glycol (GLYCOLAX) 17 gram PwPk 17 g by Per G Tube route 2 (two) times daily.    sodium chloride 3% 3 % nebulizer solution  Take 4 mLs by nebulization 2 (two) times a day.    thiamine (VITAMIN B-1) 50 MG tablet Take 50 mg by mouth once daily.    WIXELA INHUB 250-50 mcg/dose diskus inhaler SMARTSI Puff(s) By Mouth Every 12 Hours    [DISCONTINUED] losartan (COZAAR) 50 MG tablet 1 tablet (50 mg total) by Per G Tube route once daily.     Family History    None       Tobacco Use    Smoking status: Former     Current packs/day: 0.00     Average packs/day: 2.0 packs/day for 55.0 years (110.0 ttl pk-yrs)     Types: Cigarettes     Start date: 1963     Quit date: 2018     Years since quittin.7    Smokeless tobacco: Never    Tobacco comments:     3/3 ppd x 40 yrs. Currently 3-4 cigarettes daily .He is trying  to quit. Is using a Vapor cigarettes  2-3 x's a day.   Substance and Sexual Activity    Alcohol use: Not Currently    Drug use: No    Sexual activity: Not Currently     Review of Systems   Constitutional:  Positive for fatigue and fever.   HENT:  Negative for congestion.    Eyes:  Negative for discharge.   Respiratory:  Positive for cough and shortness of breath. Negative for chest tightness.    Cardiovascular:  Negative for chest pain and leg swelling.   Gastrointestinal:  Negative for abdominal pain.   Endocrine: Negative for polyphagia.   Genitourinary:  Negative for difficulty urinating.   Musculoskeletal:  Positive for arthralgias.   Skin:  Negative for color change.   Allergic/Immunologic: Negative for food allergies.   Neurological:  Negative for headaches.   Psychiatric/Behavioral:  Negative for confusion.      Objective:     Vital Signs (Most Recent):  Temp: 98 °F (36.7 °C) (24 1501)  Pulse: 93 (24 1800)  Resp: (!) 40 (24 1800)  BP: 123/75 (24 1800)  SpO2: (!) 91 % (24 1800) Vital Signs (24h Range):  Temp:  [97.7 °F (36.5 °C)-98.5 °F (36.9 °C)] 98 °F (36.7 °C)  Pulse:  [] 93  Resp:  [20-40] 40  SpO2:  [89 %-100 %] 91 %  BP: ()/(61-80) 123/75     Weight: 57.5 kg (126 lb 12.2  oz)  Body mass index is 18.72 kg/m².     Physical Exam  Vitals and nursing note reviewed.   Constitutional:       General: He is not in acute distress.     Appearance: He is ill-appearing (chronically). He is not toxic-appearing.   HENT:      Head: Normocephalic and atraumatic.      Mouth/Throat:      Mouth: Mucous membranes are moist.   Cardiovascular:      Rate and Rhythm: Normal rate and regular rhythm.   Pulmonary:      Effort: Pulmonary effort is normal.      Breath sounds: Normal breath sounds. No wheezing, rhonchi or rales.      Comments: 10L trach collar  Abdominal:      General: Bowel sounds are normal. There is no distension.      Palpations: Abdomen is soft. There is no mass.      Tenderness: There is no abdominal tenderness. There is no guarding.      Comments: PEG in place   Musculoskeletal:      Right lower leg: No edema.      Left lower leg: No edema.   Skin:     General: Skin is warm and dry.   Neurological:      Mental Status: He is alert. Mental status is at baseline.                  DATA:     Laboratory:  CBC:  Recent Labs   Lab 01/16/24  0445 01/17/24  0358 01/18/24  0453   WBC 6.50 9.09 5.92   Hemoglobin 9.9 L 9.6 L 10.1 L   Hematocrit 32.4 L 32.0 L 34.7 L   Platelets 201 206 214         CHEMISTRIES:  Recent Labs   Lab 05/12/22  0343 05/14/22  0724 05/17/22  1007 08/24/22  1000 01/16/24  0445 01/17/24  0358 01/18/24  0453   Glucose 110 96 162 H   < > 94 154 H 85   Sodium 135 L 136 133 L   < > 140 140 142   Potassium 4.4 4.1 5.0   < > 4.0 3.5 3.3 L   BUN 24 H 18 15   < > 20 20 19   Creatinine 0.7 0.7 0.7   < > 0.8 1.0 0.9   eGFR if  >60.0 >60 >60.0  --   --   --   --    eGFR if non African American >60.0 >60 >60.0  --   --   --   --    Calcium 9.1 9.6 9.5   < > 9.8 9.7 9.6   Magnesium 2.0  --  2.0   < > 1.9 1.9 2.0    < > = values in this interval not displayed.         CARDIAC BIOMARKERS:  Recent Labs   Lab 01/11/24  0021 01/11/24  0311 01/11/24  0855   Troponin I 0.393 H 1.011  H 3.344 H         COAGS:  Recent Labs   Lab 04/02/23  0322 11/26/23  0505 01/11/24  0855   INR 1.0 1.1 1.1         LIPIDS/LFTS:  Recent Labs   Lab 08/29/23  1522 10/20/23  0815 01/12/24  0635 01/13/24  0241 01/16/24  0445 01/17/24  0358 01/18/24  0453   Cholesterol 85 L  --  95 L  --   --   --   --    Triglycerides 68  --  60  --   --   --   --    HDL 25 L  --  37 L  --   --   --   --    LDL Cholesterol 46.4 L  --  46.0 L  --   --   --   --    Non-HDL Cholesterol 60  --  58  --   --   --   --    AST 20   < > 55 H   < > 18 19 27   ALT 28   < > 29   < > 19 18 23    < > = values in this interval not displayed.         Hemoglobin A1C   Date Value Ref Range Status   08/29/2023 5.8 (H) 4.0 - 5.6 % Final     Comment:     ADA Screening Guidelines:  5.7-6.4%  Consistent with prediabetes  >or=6.5%  Consistent with diabetes    High levels of fetal hemoglobin interfere with the HbA1C  assay. Heterozygous hemoglobin variants (HbS, HgC, etc)do  not significantly interfere with this assay.   However, presence of multiple variants may affect accuracy.     09/27/2022 6.3 (H) 4.0 - 5.6 % Final     Comment:     ADA Screening Guidelines:  5.7-6.4%  Consistent with prediabetes  >or=6.5%  Consistent with diabetes    High levels of fetal hemoglobin interfere with the HbA1C  assay. Heterozygous hemoglobin variants (HbS, HgC, etc)do  not significantly interfere with this assay.   However, presence of multiple variants may affect accuracy.         TSH  Recent Labs   Lab 05/23/23  0849 07/10/23  1040 08/29/23  1522   TSH 4.379 H 3.610 4.460 H         The ASCVD Risk score (Caitlyn DK, et al., 2019) failed to calculate for the following reasons:    The patient has a prior MI or stroke diagnosis       BNP    Lab Results   Component Value Date/Time    BNP 2,467 (H) 01/11/2024 12:21 AM    BNP 1,708 (H) 11/20/2023 04:06 PM     (H) 10/20/2023 08:15 AM    BNP 1,185 (H) 04/18/2023 07:07 AM    BNP 2,543 (H) 03/21/2023 03:25 PM     (H)  03/18/2023 11:06 AM     (H) 09/23/2022 12:47 PM     (H) 09/14/2022 04:59 AM    BNP 98 05/14/2022 07:25 AM     (H) 04/22/2022 05:04 PM    BNP 1,607 (H) 04/19/2022 09:29 AM     (H) 03/21/2022 02:39 PM     (H) 11/02/2019 02:52 AM     (H) 12/27/2018 01:29 PM            ECHO    Results for orders placed during the hospital encounter of 01/11/24    Echo    Interpretation Summary    Left Ventricle: The left ventricle is normal in size. Mildly increased wall thickness. There is concentric hypertrophy. Normal wall motion. There is low normal systolic function with a visually estimated ejection fraction of 50 - 55%. Unable to assess diastolic function due to E-A fusion.    Right Ventricle: Normal right ventricular cavity size. Systolic function is normal.    Left Atrium: Left atrium is moderately dilated.    Right Atrium: Right atrium is mildly dilated.    Mitral Valve: There is mild regurgitation.    Tricuspid Valve: There is mild regurgitation.    Pulmonary Artery: The estimated pulmonary artery systolic pressure is 13 mmHg.    IVC/SVC: Normal venous pressure at 3 mmHg.      STRESS TEST    No results found for this or any previous visit.        CATH    Results for orders placed during the hospital encounter of 03/18/23    Cardiac catheterization    Conclusion  Description of the findings of the procedure: uneventful LHC/cor angio/Ao angio/iliac angio/PCI prox LAD BRADLEY x1, PCI mid LCx BRADLEY x1/R rad vasband/RFA man comp.    Findings/Key Components:  LVEDP: 3mmHg  LVEF: 60%    Aortic arch: severe calcific atherosclerosis  Ost innominate artery 80%  Prox R SCA 90%, 53mmHg gradient across stenoses.  Prox-mid ICA patent  Distal aortoiliac bifurcation with patent stents in Ю[place] bilaterally and possible severe ISR in R ЮЛИЯ.    Severe diffuse cor Ca++  Dominance: Right  LM: MLI  LAD: prox 95% at D1, mid  Ramus: MLI  LCx: mid 90%  RCA: not injected, diffuse disease, severe dist RCA stenosis (see  Dr. Benitez cath report    PCI prox LAD:  Preop ASA/Plavix, intraop heparin  Wired LAD/D1  Predil 2.5x12, 2.75x15 NC  Stent 3.0x24 Synergy BRADLEY  Post IVUS with mild underexpansion mid stent  Postdil 3.25x20 NC at 22atm  Excellent angiographic result, T3 flow, 0% residual stenosis    PCI mid LCx  Predil 2.5x12  Stent 2.75x20 Synergy BRADLEY 14 fidelia  Unavble to pass IVUS due to severe prox LCx tortuosity  Excellent angiographic result, T3 flow, 0% residual stenosis    Hemostasis:  R Radial band  RFA man comp

## 2024-01-19 NOTE — PROGRESS NOTES
[facility] Intensive Care  Cardiology  Progress Note    Patient Name: Fareed Richard Jr.  MRN: [id number]  Admission Date: 1/11/2024  Hospital Length of Stay: 7 days  Code Status: Full Code   Attending Physician: Santos Marlow, *   Primary Care Physician: Kodi Tubbs MD  Expected Discharge Date: 1/19/2024  Principal Problem:Acute on chronic respiratory failure with hypoxia    Subjective:       Interval History:  Went back into AFib today.  Converted to sinus rhythm on amiodarone drip in ICU.  Currently on amiodarone drip.  Denies chest pains        Past Medical History:   Diagnosis Date    Cancer     skin to Rt forearm and face    COPD (chronic obstructive pulmonary disease)     Hyperlipidemia     Hypertension     Pseudoaneurysm        Past Surgical History:   Procedure Laterality Date    ABDOMINAL SURGERY      stents placed in liver and large intestines, per patient    ANGIOGRAPHY OF AORTIC ARCH  3/27/2023    Procedure: Aortogram, Aortic Arch;  Surgeon: Tim Bhatt MD;  Location: [facility] CATH LAB;  Service: Cardiology;;    AORTOGRAPHY WITH SERIALOGRAPHY N/A 3/27/2023    Procedure: AORTOGRAM, WITH SERIALOGRAPHY;  Surgeon: Tim Bhatt MD;  Location: [facility] CATH LAB;  Service: Cardiology;  Laterality: N/A;    CAROTID STENT      COLONOSCOPY N/A 09/27/2022    Procedure: COLONOSCOPY;  Surgeon: Donnie Peterson MD;  [facility]  Service: Endoscopy;  Laterality: N/A;    COLONOSCOPY N/A 09/30/2022    Procedure: COLONOSCOPY;  Surgeon: Joy Cabrera MD;  Location: [facility] ENDO (2ND FLR);  Service: Endoscopy;  Laterality: N/A;    CORONARY STENT PLACEMENT  01/2000    DISSECTION OF NECK Bilateral 01/04/2022    Procedure: DISSECTION, NECK;  Surgeon: Naeem Smalls MD;  Location: [facility] OR 2ND FLR;  Service: ENT;  Laterality: Bilateral;    ESOPHAGOGASTRODUODENOSCOPY N/A 09/27/2022    Procedure: EGD (ESOPHAGOGASTRODUODENOSCOPY);  Surgeon: Donnie Peterson MD;  Location: [facility] ENDO (2ND FLR);   Service: Endoscopy;  Laterality: N/A;    ESOPHAGOGASTRODUODENOSCOPY N/A 09/30/2022    Procedure: EGD (ESOPHAGOGASTRODUODENOSCOPY);  Surgeon: Joy Cabrera MD;  Location: [facility] ENDO (2ND FLR);  Service: Endoscopy;  Laterality: N/A;    ESOPHAGOGASTRODUODENOSCOPY W/ PEG N/A 01/04/2022    Procedure: EGD, WITH PEG TUBE INSERTION;  Surgeon: Anthony Calabrese MD;  Location: [facility] OR 2ND FLR;  Service: General;  Laterality: N/A;    EYE SURGERY      Cataract bilateral    femoral stents      bilateral    FLAP PROCEDURE N/A 01/03/2022    Procedure: CREATION, FREE FLAP;  Surgeon: Naeem Smalls MD;  Location: [facility] OR [facility] FLR;  Service: ENT;  Laterality: N/A;    FLAP PROCEDURE Right 01/04/2022    Procedure: CREATION, FREE FLAP;  Surgeon: Stacey Conde MD;  [facility];  Service: ENT;  Laterality: Right;  Ischemic start 1203  Ischemic stop 1353    GLOSSECTOMY N/A 01/04/2022    Procedure: GLOSSECTOMY;  Surgeon: Naeem Smalls MD;  Location: [facility] OR 2ND FLR;  Service: ENT;  Laterality: N/A;    LEFT HEART CATHETERIZATION Left 3/23/2023    Procedure: Left heart cath;  Surgeon: Tacos Benitez MD;  Location: [facility] CATH LAB;  Service: Cardiology;  Laterality: Left;    PERCUTANEOUS TRANSLUMINAL BALLOON ANGIOPLASTY OF CORONARY ARTERY Left 3/27/2023    Procedure: Angioplasty-coronary;  Surgeon: Tim Bhatt MD;  Location: [facility] CATH LAB;  Service: Cardiology;  Laterality: Left;  not before 9am, R rad access, 6 Fr EBU 3.5 guide    RADICAL NECK DISSECTION Left 01/03/2022    Procedure: DISSECTION, NECK, RADICAL;  Surgeon: Naeem Smalls MD;  [facility];  Service: ENT;  Laterality: Left;    SKIN BIOPSY      SKIN CANCER EXCISION      STENT, CORONARY, MULTI VESSEL  3/27/2023    Procedure: Stent, Coronary, Multi Vessel;  Surgeon: Tim Bhatt MD;  Location: [facility] CATH LAB;  Service: Cardiology;;    TRACHEOTOMY N/A 01/04/2022    Procedure: TRACHEOTOMY;  Surgeon: Naeem Smalls MD;   Location: [facility] OR [address] [place] [address];  Service: ENT;  Laterality: N/A;    VASCULAR SURGERY         Review of patient's allergies indicates:   Allergen Reactions    Ativan [lorazepam] Anxiety       No current facility-administered medications on file prior to encounter.     Current Outpatient Medications on File Prior to Encounter   Medication Sig    albuterol-ipratropium (DUO-NEB) 2.5 mg-0.5 mg/3 mL nebulizer solution Take 3 mLs by nebulization every 4 (four) hours as needed for Wheezing. Rescue    ascorbic acid, vitamin C, (VITAMIN C) 100 MG tablet Take 100 mg by mouth once daily.    aspirin 81 MG Chew Take 1 tablet (81 mg total) by mouth once daily.    atorvastatin (LIPITOR) 40 MG tablet 1 tablet (40 mg total) by Per G Tube route every evening. (Patient taking differently: 40 mg by Per G Tube route once daily.)    bisacodyL (DULCOLAX) 10 mg Supp Place 1 suppository (10 mg total) rectally daily as needed.    clopidogreL (PLAVIX) 75 mg tablet Take 1 tablet (75 mg total) by mouth once daily. (Patient taking differently: Take 75 mg by mouth nightly.)    ferrous sulfate 325 (65 FE) MG EC tablet Take 325 mg by mouth 3 (three) times daily with meals.    FLUoxetine (PROZAC) 20 mg/5 mL (4 mg/mL) solution Take 20 mg by mouth once daily.    folic acid (FOLVITE) 1 MG tablet Take 1 mg by mouth once daily.    furosemide (LASIX) 40 MG tablet Take 0.5 tablets (20 mg total) by mouth once daily. (Patient taking differently: Take 20 mg by mouth nightly.)    glycopyrrolate (CUVPOSA) 1 mg/5 mL (0.2 mg/mL) Soln Take 5 mLs (1 mg total) by mouth 3 (three) times daily as needed (TO REDUCE SECRETIONS).    hydrOXYzine HCL (ATARAX) 25 MG tablet SMARTSI.5 Tablet(s) Gastro Tube Every 8 Hours PRN    melatonin (MELATIN) 3 mg tablet 1 tablet (3 mg total) by Per G Tube route nightly.    metoprolol tartrate (LOPRESSOR) 25 MG tablet Take 0.5 tablets (12.5 mg total) by mouth 2 (two) times daily.    omeprazole (PRILOSEC) 40 MG capsule Take 40 mg (contents of  one capsule) twice daily through PEG tube. Mix contents of capsule with 10 mL applesauce. (Patient taking differently: 40 mg 2 (two) times a day. Take 40 mg (contents of one capsule) twice daily through PEG tube. Mix contents of capsule with 10 mL applesauce.)    polyethylene glycol (GLYCOLAX) 17 gram PwPk 17 g by Per G Tube route 2 (two) times daily.    sodium chloride 3% 3 % nebulizer solution Take 4 mLs by nebulization 2 (two) times a day.    thiamine (VITAMIN B-1) 50 MG tablet Take 50 mg by mouth once daily.    WIXELA INHUB 250-50 mcg/dose diskus inhaler SMARTSI Puff(s) By Mouth Every 12 Hours    [DISCONTINUED] losartan (COZAAR) 50 MG tablet 1 tablet (50 mg total) by Per G Tube route once daily.     Family History    None       Tobacco Use    Smoking status: Former     Current packs/day: 0.00     Average packs/day: 2.0 packs/day for 55.0 years (110.0 ttl pk-yrs)     Types: Cigarettes     Start date: 1963     Quit date: 2018     Years since quittin.7    Smokeless tobacco: Never    Tobacco comments:     3/3 ppd x 40 yrs. Currently 3-4 cigarettes daily .He is trying  to quit. Is using a Vapor cigarettes  2-3 x's a day.   Substance and Sexual Activity    Alcohol use: Not Currently    Drug use: No    Sexual activity: Not Currently     Review of Systems   Constitutional:  Positive for fatigue and fever.   HENT:  Negative for congestion.    Eyes:  Negative for discharge.   Respiratory:  Positive for cough and shortness of breath. Negative for chest tightness.    Cardiovascular:  Negative for chest pain and leg swelling.   Gastrointestinal:  Negative for abdominal pain.   Endocrine: Negative for polyphagia.   Genitourinary:  Negative for difficulty urinating.   Musculoskeletal:  Positive for arthralgias.   Skin:  Negative for color change.   Allergic/Immunologic: Negative for food allergies.   Neurological:  Negative for headaches.   Psychiatric/Behavioral:  Negative for confusion.      Objective:      Vital Signs (Most Recent):  Temp: 98 °F (36.7 °C) (01/18/24 1501)  Pulse: 93 (01/18/24 1800)  Resp: (!) 40 (01/18/24 1800)  BP: 123/75 (01/18/24 1800)  SpO2: (!) 91 % (01/18/24 1800) Vital Signs (24h Range):  Temp:  [97.7 °F (36.5 °C)-98.5 °F (36.9 °C)] 98 °F (36.7 °C)  Pulse:  [] 93  Resp:  [20-40] 40  SpO2:  [89 %-100 %] 91 %  BP: ()/(61-80) 123/75     Weight: 57.5 kg (126 lb 12.2 oz)  Body mass index is 18.72 kg/m².     Physical Exam  Vitals and nursing note reviewed.   Constitutional:       General: He is not in acute distress.     Appearance: He is ill-appearing (chronically). He is not toxic-appearing.   HENT:      Head: Normocephalic and atraumatic.      Mouth/Throat:      Mouth: Mucous membranes are moist.   Cardiovascular:      Rate and Rhythm: Normal rate and regular rhythm.   Pulmonary:      Effort: Pulmonary effort is normal.      Breath sounds: Normal breath sounds. No wheezing, rhonchi or rales.      Comments: 10L trach collar  Abdominal:      General: Bowel sounds are normal. There is no distension.      Palpations: Abdomen is soft. There is no mass.      Tenderness: There is no abdominal tenderness. There is no guarding.      Comments: PEG in place   Musculoskeletal:      Right lower leg: No edema.      Left lower leg: No edema.   Skin:     General: Skin is warm and dry.   Neurological:      Mental Status: He is alert. Mental status is at baseline.                  DATA:     Laboratory:  CBC:  Recent Labs   Lab 01/16/24  0445 01/17/24  0358 01/18/24  0453   WBC 6.50 9.09 5.92   Hemoglobin 9.9 L 9.6 L 10.1 L   Hematocrit 32.4 L 32.0 L 34.7 L   Platelets 201 206 214         CHEMISTRIES:  Recent Labs   Lab 05/12/22  0343 05/14/22  0724 05/17/22  1007 08/24/22  1000 01/16/24  0445 01/17/24  0358 01/18/24  0453   Glucose 110 96 162 H   < > 94 154 H 85   Sodium 135 L 136 133 L   < > 140 140 142   Potassium 4.4 4.1 5.0   < > 4.0 3.5 3.3 L   BUN 24 H 18 15   < > 20 20 19   Creatinine 0.7 0.7  0.7   < > 0.8 1.0 0.9   eGFR if  >60.0 >60 >60.0  --   --   --   --    eGFR if non African American >60.0 >60 >60.0  --   --   --   --    Calcium 9.1 9.6 9.5   < > 9.8 9.7 9.6   Magnesium 2.0  --  2.0   < > 1.9 1.9 2.0    < > = values in this interval not displayed.         CARDIAC BIOMARKERS:  Recent Labs   Lab 01/11/24  0021 01/11/24  0311 01/11/24  0855   Troponin I 0.393 H 1.011 H 3.344 H         COAGS:  Recent Labs   Lab 04/02/23  0322 11/26/23  0505 01/11/24  0855   INR 1.0 1.1 1.1         LIPIDS/LFTS:  Recent Labs   Lab 08/29/23  1522 10/20/23  0815 01/12/24  0635 01/13/24  0241 01/16/24  0445 01/17/24  0358 01/18/24  0453   Cholesterol 85 L  --  95 L  --   --   --   --    Triglycerides 68  --  60  --   --   --   --    HDL 25 L  --  37 L  --   --   --   --    LDL Cholesterol 46.4 L  --  46.0 L  --   --   --   --    Non-HDL Cholesterol 60  --  58  --   --   --   --    AST 20   < > 55 H   < > 18 19 27   ALT 28   < > 29   < > 19 18 23    < > = values in this interval not displayed.         Hemoglobin A1C   Date Value Ref Range Status   08/29/2023 5.8 (H) 4.0 - 5.6 % Final     Comment:     ADA Screening Guidelines:  5.7-6.4%  Consistent with prediabetes  >or=6.5%  Consistent with diabetes    High levels of fetal hemoglobin interfere with the HbA1C  assay. Heterozygous hemoglobin variants (HbS, HgC, etc)do  not significantly interfere with this assay.   However, presence of multiple variants may affect accuracy.     09/27/2022 6.3 (H) 4.0 - 5.6 % Final     Comment:     ADA Screening Guidelines:  5.7-6.4%  Consistent with prediabetes  >or=6.5%  Consistent with diabetes    High levels of fetal hemoglobin interfere with the HbA1C  assay. Heterozygous hemoglobin variants (HbS, HgC, etc)do  not significantly interfere with this assay.   However, presence of multiple variants may affect accuracy.         TSH  Recent Labs   Lab 05/23/23  0849 07/10/23  1040 08/29/23  1522   TSH 4.379 H 3.610 4.460 H          The ASCVD Risk score (Caitlyn DUPONT, et al., 2019) failed to calculate for the following reasons:    The patient has a prior MI or stroke diagnosis       BNP    Lab Results   Component Value Date/Time    BNP 2,467 (H) 01/11/2024 12:21 AM    BNP 1,708 (H) 11/20/2023 04:06 PM     (H) 10/20/2023 08:15 AM    BNP 1,185 (H) 04/18/2023 07:07 AM    BNP 2,543 (H) 03/21/2023 03:25 PM     (H) 03/18/2023 11:06 AM     (H) 09/23/2022 12:47 PM     (H) 09/14/2022 04:59 AM    BNP 98 05/14/2022 07:25 AM     (H) 04/22/2022 05:04 PM    BNP 1,607 (H) 04/19/2022 09:29 AM     (H) 03/21/2022 02:39 PM     (H) 11/02/2019 02:52 AM     (H) 12/27/2018 01:29 PM            ECHO    Results for orders placed during the hospital encounter of 01/11/24    Echo    Interpretation Summary    Left Ventricle: The left ventricle is normal in size. Mildly increased wall thickness. There is concentric hypertrophy. Normal wall motion. There is low normal systolic function with a visually estimated ejection fraction of 50 - 55%. Unable to assess diastolic function due to E-A fusion.    Right Ventricle: Normal right ventricular cavity size. Systolic function is normal.    Left Atrium: Left atrium is moderately dilated.    Right Atrium: Right atrium is mildly dilated.    Mitral Valve: There is mild regurgitation.    Tricuspid Valve: There is mild regurgitation.    Pulmonary Artery: The estimated pulmonary artery systolic pressure is 13 mmHg.    IVC/SVC: Normal venous pressure at 3 mmHg.      STRESS TEST    No results found for this or any previous visit.        CATH    Results for orders placed during the hospital encounter of 03/18/23    Cardiac catheterization    Conclusion  Description of the findings of the procedure: uneventful LHC/cor angio/Ao angio/iliac angio/PCI prox LAD BRADLEY x1, PCI mid LCx BRADLEY x1/R rad vasband/RFA man comp.    Findings/Key Components:  LVEDP: 3mmHg  LVEF: 60%    Aortic arch:  severe calcific atherosclerosis  Ost innominate artery 80%  Prox R SCA 90%, 53mmHg gradient across stenoses.  Prox-mid ICA patent  Distal aortoiliac bifurcation with patent stents in ЮЛИЯ bilaterally and possible severe ISR in R ЮЛИЯ.    Severe diffuse cor Ca++  Dominance: Right  LM: MLI  LAD: prox 95% at D1, mid  Ramus: MLI  LCx: mid 90%  RCA: not injected, diffuse disease, severe dist RCA stenosis (see Dr. Benitez cath report    PCI prox LAD:  Preop ASA/Plavix, intraop heparin  Wired LAD/D1  Predil 2.5x12, 2.75x15 NC  Stent 3.0x24 Synergy BRADLEY  Post IVUS with mild underexpansion mid stent  Postdil 3.25x20 NC at 22atm  Excellent angiographic result, T3 flow, 0% residual stenosis    PCI mid LCx  Predil 2.5x12  Stent 2.75x20 Synergy BRADLEY 14 fidelia  Unavble to pass IVUS due to severe prox LCx tortuosity  Excellent angiographic result, T3 flow, 0% residual stenosis    Hemostasis:  R Radial band  RFA man comp  Assessment and Plan:     Brief HPI:     * Acute on chronic respiratory failure with hypoxia        Atrial fibrillation with RVR  Went into AFib with RVR today a.m..  On amiodarone drip, converted back into sinus rhythm.  Eliquis.  Not a good candidate for long-term amiodarone considering his respiratory status    Ventilator associated pneumonia        NSTEMI (non-ST elevated myocardial infarction)  TYPE 1 VERSUS TYPE 2.  PATIENT IS CHEST PAIN-FREE.  STATES THAT PRIOR TO HIS PCI WHEN HE HAD THE NON-STEMI LAST TIME, HE DID HAVE CHEST PAINS.  EKG DOES SHOW ST DEPRESSIONS.  CASE DISCUSSED IN DETAIL WITH THE DR. TAMAYO WHO IS THE PATIENT'S PRIMARY CARDIOLOGIST.  LAST PCI WAS COMPLICATED BY PULMONARY AND RETROPERITONEAL HEMORRHAGE.  WHEN THE PATIENT SAW DR. TAMAYO IN CLINIC AS AN OUTPATIENT, DR. TAMAYO HAD RECOMMENDED PALLIATIVE CARE CONSULT.  TODAY WITH ME AND DR. TAMAYO HAD A DETAILED DISCUSSION AND CONSIDERING HIS POOR STATE OF HEALTH, MULTIPLE COMORBIDITIES, THE FACT THAT HE IS CHEST PAIN-FREE AND THE FACT THAT HE HAD  MULTIPLE COMPLICATIONS CHRONIC PULMONARY HEMORRHAGE AS WELL AS RETROPERITONEAL HEMORRHAGE AFTER HIS LAST CATHETERIZATION AND PCI BY DR. WEST, WE RECOMMEND MEDICAL MANAGEMENT AND PALLIATIVE CARE CONSULT AT THE CURRENT TIME.  HE IS CURRENTLY COMFORTABLE AND CHEST PAIN-FREE.  CONTINUE MEDICAL MANAGEMENT.  IF DEVELOPS LIFESTYLE LIMITING ANGINA, THEN DISCUSSION OF HIGH-RISK PCI WILL BE HELD.      Jan 17:  Patient has been chest pain-free for multiple days now.  Continue medical management.  Will follow p.r.n..  Please consult as needed    PEG (percutaneous endoscopic gastrostomy) status        Tracheostomy present        Severe protein-calorie malnutrition          Other hyperlipidemia        Coronary artery disease involving native coronary artery of native heart without angina pectoris  ALTHOUGH PATIENT'S TROPONINS ELEVATED, HE IS CHEST PAIN-FREE.  STATES THAT PRIOR TO HIS STENTS IN MARCH HE HAD CHEST PAINS AND CURRENTLY IS CHEST PAIN-FREE.  IT IS POSSIBLE THAT HIS TROPONIN COULD BE ELEVATED SECONDARY TO HIS HYPOXEMIA ON PRESENTATION.  HOWEVER DOES HAVE LATERAL ST DEPRESSIONS AND ISCHEMIC LOOKING EKG.    1/12 has continued to be chest pain-free.    Peripheral vascular disease              VTE Risk Mitigation (From admission, onward)           Ordered     apixaban tablet 2.5 mg  2 times daily         01/18/24 0815     heparin 25,000 units in dextrose 5% 250 mL (100 units/mL) infusion LOW INTENSITY nomogram - [facility]  Continuous        Question:  Begin at (units/kg/hr)  Answer:  12    01/13/24 0819     IP VTE LOW RISK PATIENT  Once         01/11/24 1648     Place sequential compression device  Until discontinued         01/11/24 0226                    Arturo Malone MD  Cardiology  [facility] [place]      Critical Care Time:  35 minutes     Critical care was time spent personally by me on the following activities: development of treatment plan with patient or surrogate and bedside caregivers, discussions with  consultants, evaluation of patient's response to treatment, examination of patient, ordering and performing treatments and interventions, ordering and review of laboratory studies, ordering and review of radiographic studies, pulse oximetry, re-evaluation of patient's condition. This critical care time did not overlap with that of any other provider or involve time for any procedures.

## 2024-01-19 NOTE — PROGRESS NOTES
[facility] Medicine  Progress Note    Patient Name: Fareed Richard Jr.  MRN: [id number]  Patient Class: IP- Inpatient   Admission Date: 1/11/2024  Length of Stay: 8 days  Attending Physician: Santos Marlow, *  Primary Care Provider: Kodi Tubbs MD        Subjective:     Principal Problem:Acute on chronic respiratory failure with hypoxia        HPI:  Mr Fareed Richard Jr. 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 He is on 6L trach collar at home and receives tube feeding. He had shortness of breath, fever, and increased trach output. Denies chest pain.     In ED, he was tachycardic and tachypneic with SpO2 85% on 8L. He was given albuterol, vanc/zosyn, solumedrol. He was admitted to ICU.     Overview/Hospital Course:  Mr Fareed Richard Jr. is a 73 y.o. man who was admitted with acute on chronic hypoxic respiratory failure due to pneumonia, suspect recurrent Pseudomonas. Also with NSTEMI on admit in setting of known CAD. Started vanc, zosyn. Weaned O2. Cardiology consulted- due to severe CAD and complications with last angiogram <1 year ago, will plan medical management with heparin gtt, asa, plavix, and will NOT pursue ischemic evaluation. He is improving. Stepped down to floor. Cultures growing pseudomonas in sputum. On zosyn. Vancomycin stopped. ID consulted,continue with zosyn,on 40% FIO2.  Medical treat ment for NSTEMI per cardiology.  Has Afib with RVR,on floor,was send to ICU on amiodarone gtt,HR is improved.on maintains amio gtt and eliquis,cardiology is following.  Still on 98% FIO2,started on CPT and acetylcysteine nebulizer,consulted pulmonology.    Interval History:Has Afib with RVR,on floor,was send to ICU on amiodarone gtt,HR is improved.  But on 98% FIO2,  Review of Systems  Objective:     Vital Signs (Most  Recent):  Temp: 98.7 °F (37.1 °C) (01/19/24 0701)  Pulse: 74 (01/19/24 1045)  Resp: (!) 30 (01/19/24 1045)  BP: 131/76 (01/19/24 1045)  SpO2: 98 % (01/19/24 1045) Vital Signs (24h Range):  Temp:  [98 °F (36.7 °C)-99 °F (37.2 °C)] 98.7 °F (37.1 °C)  Pulse:  [] 74  Resp:  [23-40] 30  SpO2:  [89 %-100 %] 98 %  BP: ()/(61-82) 131/76     Weight: 57.5 kg (126 lb 12.2 oz)  Body mass index is 18.72 kg/m².    Intake/Output Summary (Last 24 hours) at 1/19/2024 1145  Last data filed at 1/19/2024 1056  Gross per 24 hour   Intake 1079.79 ml   Output 850 ml   Net 229.79 ml           Physical Exam  Vitals reviewed.   Constitutional:       General: He is not in acute distress.     Appearance: He is ill-appearing.   HENT:      Head: Normocephalic and atraumatic.      Mouth/Throat:      Mouth: Mucous membranes are dry.   Eyes:      General: No scleral icterus.     Extraocular Movements: Extraocular movements intact.   Cardiovascular:      Rate and Rhythm: Normal rate. Rhythm irregular.   Pulmonary:      Effort: Pulmonary effort is normal. Respiratory distress: course respirations.   Abdominal:      Palpations: Abdomen is soft.      Tenderness: There is no abdominal tenderness.   Musculoskeletal:         General: No swelling or tenderness.      Cervical back: Neck supple. No rigidity.   Skin:     General: Skin is warm and dry.   Neurological:      General: No focal deficit present.      Mental Status: He is alert and oriented to person, place, and time.   Psychiatric:         Mood and Affect: Mood normal.         Behavior: Behavior normal.             Significant Labs: All pertinent labs within the past 24 hours have been reviewed.    Significant Imaging: I have reviewed all pertinent imaging results/findings within the past 24 hours.    Assessment/Plan:      * Acute on chronic respiratory failure with hypoxia  Patient admitted with Hypoxic which is Acute on Chronic.  he is on home oxygen at 6 LPM trach collar.  Signs/symptoms of respiratory failure include- increased work of breathing and respiratory distress present on admission.   - Labs and images were reviewed. Patient Has not has a recent ABG.   - Supplemental oxygen was provided and noted-  trach collar    - Respiratory failure is due to- CHF and Pneumonia   - CT with RML PNA. Trach aspirate suspected Pseudomonas. Continue vanc, zosyn for now- likely can drop vanc tomorrow   - BNP higher than ever before. TTE normal EF. NSTEMI present. Continue lasix 40mg IV daily- can likely change to PO tomorrow  - CTA no PE  - improving from admit  Consulted ID for Abx management. Continuing with Zosyn  On high FIO2 ,continue with HFO and frequent suctioning,pulmonology is on case,    Still on 98% FIO2,started on CPT and acetylcysteine nebulizer,consulted pulmonology.            Atrial fibrillation with RVR  Has Afib with RVR,on floor,was send to ICU on amiodarone gtt,HR is improved..on maintains amio gtt and eliquis,cardiology is following.      Ventilator associated pneumonia  RML infiltrate  Trach aspirate with suspect Pseudomonas  - continue zosyn. ID consulted. Vancomycin stopped        Normocytic anemia  Patient's anemia is currently controlled. Has not received any PRBCs to date. Etiology likely d/t  chronic disease  Current CBC reviewed-   Lab Results   Component Value Date    HGB 9.9 (L) 01/16/2024    HCT 32.4 (L) 01/16/2024     Monitor serial CBC and transfuse if patient becomes hemodynamically unstable, symptomatic or H/H drops below 7/21.    Acute on chronic diastolic heart failure  Patient admitted with shortness of breath. His BNP is higher than ever before despite him appearing euvolemic.      BNP  Recent Labs   Lab 01/11/24  0021   BNP 2,467*       CT RML infiltrate  Recent Labs   Lab 01/11/24  0855   TROPONINI 3.344*       EKG not able to see in MUSE- will need to find. Per Cardiology, no STEMI    Continue 40mg IV lasix QD. Monitor UOP. Likely change to PO lasix  tomorrow  TTE normal EF, potential diastolic dysfunction  Cardiology consulted for NSTEMI- no ischemic eval planned         NSTEMI (non-ST elevated myocardial infarction)  Admitted with NSTEMI. No chest pain.  Recent Labs   Lab 01/11/24  0855   TROPONINI 3.344*       EKG with ischemic changes   He has know CAD  Started asa, plavix, heparin gtt, statin.   TTE EF 50-55%    Medical treat ment for NSTEMI per cardiology.        PEG (percutaneous endoscopic gastrostomy) status  Patient noted to have a percutaneous endoscopic gastrostomy tube in place. I have personally inspected the tube.Tube was placed prior to this admission There are no signs of drainage or infection around the site. The tube is patent. Medications have converted to liquid form if available.  Routine care to be done by wound care and nursing staff.   - resumed tube feeds         Tracheostomy present  Noted      ACP (advance care planning)  Advance Care Planning    Date: 01/11/2024  Full code status          Severe protein-calorie malnutrition  Nutrition consulted. Most recent weight and BMI monitored-     Measurements:  Wt Readings from Last 1 Encounters:   01/16/24 58.6 kg (129 lb 3 oz)   Body mass index is 19.08 kg/m².    Patient has been screened and assessed by RD.    Interventions/Recommendations (treatment strategy):     - continue tube feeds    Secondary malignant neoplasm of cervical lymph node     Cancer Staging   Squamous cell cancer of tongue  Staging form: Oral Cavity, AJCC 8th Edition  - Clinical stage from 1/2/2022: Stage NAFISA (cT2, cN2a, cM0) - Signed by Naeem Smalls MD on 1/2/2022  - Pathologic stage from 1/4/2022: Stage IVB (pT4a, pN3b, cM0) - Signed by Christopher Jay MD on 2/16/2022        COPD exacerbation   On nebulizer     Other hyperlipidemia  Continue statin      Primary hypertension  Chronic, controlled. Latest blood pressure and vitals reviewed-     Temp:  [97.7 °F (36.5 °C)-98.4 °F (36.9 °C)]   Pulse:  []   Resp:   [18-38]   BP: ()/(57-83)   SpO2:  [90 %-100 %] .   Home meds for hypertension were reviewed and noted below.   Hypertension Medications               furosemide (LASIX) 40 MG tablet Take 0.5 tablets (20 mg total) by mouth once daily.    metoprolol tartrate (LOPRESSOR) 25 MG tablet Take 0.5 tablets (12.5 mg total) by mouth 2 (two) times daily.          - hold BP Rx because BP is borderline low     Will utilize p.r.n. blood pressure medication only if patient's blood pressure greater than 180/110 and he develops symptoms such as worsening chest pain or shortness of breath.    Coronary artery disease involving native coronary artery of native heart without angina pectoris  Patient with known CAD s/p stent placement, which is uncontrolled Will continue ASA, Plavix, and Statin and monitor for S/Sx of angina/ACS. Continue to monitor on telemetry.   - see NSTEMI    Peripheral vascular disease  Known PAD from last angiogram 3/2023  Continue asa, plavix, statin         VTE Risk Mitigation (From admission, onward)           Ordered     apixaban tablet 2.5 mg  2 times daily         01/18/24 0815     heparin 25,000 units in dextrose 5% 250 mL (100 units/mL) infusion LOW INTENSITY nomogram - [facility]  Continuous        Question:  Begin at (units/kg/hr)  Answer:  12    01/13/24 0819     IP VTE LOW RISK PATIENT  Once         01/11/24 1648     Place sequential compression device  Until discontinued         01/11/24 0225                    Discharge Planning   NISA: 1/19/2024     Code Status: Full Code   Is the patient medically ready for discharge?:     Reason for patient still in hospital (select all that apply): Patient trending condition  Discharge Plan A: Home Health   Discharge Delays: None known at this time        Critical care time spent on the evaluation and treatment of severe organ dysfunction, review of pertinent labs and imaging studies, discussions with consulting providers and discussions with patient/family:  over  45  minutes.      Santos Marlow MD  Department of Hospital Medicine   [facility] - Intensive Care

## 2024-01-19 NOTE — PLAN OF CARE
Problem: Adult Inpatient Plan of Care  Goal: Plan of Care Review  Outcome: Ongoing, Progressing  Goal: Patient-Specific Goal (Individualized)  Outcome: Ongoing, Progressing  Goal: Absence of Hospital-Acquired Illness or Injury  Outcome: Ongoing, Progressing  Goal: Optimal Comfort and Wellbeing  Outcome: Ongoing, Progressing  Goal: Readiness for Transition of Care  Outcome: Ongoing, Progressing     Problem: Skin Injury Risk Increased  Goal: Skin Health and Integrity  Outcome: Ongoing, Progressing     Problem: Fall Injury Risk  Goal: Absence of Fall and Fall-Related Injury  Outcome: Ongoing, Progressing     Problem: Fluid Imbalance (Pneumonia)  Goal: Fluid Balance  Outcome: Ongoing, Progressing     Problem: Infection (Pneumonia)  Goal: Resolution of Infection Signs and Symptoms  Outcome: Ongoing, Progressing     Problem: Respiratory Compromise (Pneumonia)  Goal: Effective Oxygenation and Ventilation  Outcome: Ongoing, Progressing     Problem: Adjustment to Illness (Acute Coronary Syndrome)  Goal: Optimal Adaptation to Illness  Outcome: Ongoing, Progressing     Problem: Dysrhythmia (Acute Coronary Syndrome)  Goal: Normalized Cardiac Rhythm  Outcome: Ongoing, Progressing     Problem: Cardiac-Related Pain (Acute Coronary Syndrome)  Goal: Absence of Cardiac-Related Pain  Outcome: Ongoing, Progressing     Problem: Hemodynamic Instability (Acute Coronary Syndrome)  Goal: Effective Cardiac Pump Function  Outcome: Ongoing, Progressing     Problem: Tissue Perfusion (Acute Coronary Syndrome)  Goal: Adequate Tissue Perfusion  Outcome: Ongoing, Progressing     Problem: Arrhythmia/Dysrhythmia (Cardiac Catheterization)  Goal: Stable Heart Rate and Rhythm  Outcome: Ongoing, Progressing     Problem: Bleeding (Cardiac Catheterization)  Goal: Absence of Bleeding  Outcome: Ongoing, Progressing     Problem: Contrast-Induced Injury Risk (Cardiac Catheterization)  Goal: Absence of Contrast-Induced Injury  Outcome: Ongoing, Progressing      Problem: Embolism (Cardiac Catheterization)  Goal: Absence of Embolism Signs and Symptoms  Outcome: Ongoing, Progressing     Problem: Ongoing Anesthesia/Sedation Effects (Cardiac Catheterization)  Goal: Anesthesia/Sedation Recovery  Outcome: Ongoing, Progressing     Problem: Pain (Cardiac Catheterization)  Goal: Acceptable Pain Control  Outcome: Ongoing, Progressing     Problem: Vascular Access Protection (Cardiac Catheterization)  Goal: Absence of Vascular Access Complication  Outcome: Ongoing, Progressing

## 2024-01-19 NOTE — SUBJECTIVE & OBJECTIVE
Past Medical History:   Diagnosis Date    Cancer     skin to Rt forearm and face    COPD (chronic obstructive pulmonary disease)     Hyperlipidemia     Hypertension     Pseudoaneurysm        Past Surgical History:   Procedure Laterality Date    ABDOMINAL SURGERY      stents placed in liver and large intestines, per patient    ANGIOGRAPHY OF AORTIC ARCH  3/27/2023    Procedure: Aortogram, Aortic Arch;  Surgeon: Tim Bhatt MD;  Location: [facility] CATH LAB;  Service: Cardiology;;    AORTOGRAPHY WITH SERIALOGRAPHY N/A 3/27/2023    Procedure: AORTOGRAM, WITH SERIALOGRAPHY;  Surgeon: Tim Bhatt MD;  Location: [facility] CATH LAB;  Service: Cardiology;  Laterality: N/A;    CAROTID STENT      COLONOSCOPY N/A 09/27/2022    Procedure: COLONOSCOPY;  Surgeon: Donnie Peterson MD;  [facility]  Service: Endoscopy;  Laterality: N/A;    COLONOSCOPY N/A 09/30/2022    Procedure: COLONOSCOPY;  Surgeon: Joy Cabrera MD;  [facility]  Service: Endoscopy;  Laterality: N/A;    CORONARY STENT PLACEMENT  01/2000    DISSECTION OF NECK Bilateral 01/04/2022    Procedure: DISSECTION, NECK;  Surgeon: Naeem Smalls MD;  [facility];  Service: ENT;  Laterality: Bilateral;    ESOPHAGOGASTRODUODENOSCOPY N/A 09/27/2022    Procedure: EGD (ESOPHAGOGASTRODUODENOSCOPY);  Surgeon: Donnie Peterson MD;  [facility]  Service: Endoscopy;  Laterality: N/A;    ESOPHAGOGASTRODUODENOSCOPY N/A 09/30/2022    Procedure: EGD (ESOPHAGOGASTRODUODENOSCOPY);  Surgeon: Joy Cabrera MD;  Location: [facility] ENDO (2ND FLR);  Service: Endoscopy;  Laterality: N/A;    ESOPHAGOGASTRODUODENOSCOPY W/ PEG N/A 01/04/2022    Procedure: EGD, WITH PEG TUBE INSERTION;  Surgeon: Anthony Calabrese MD;  [facility];  Service: General;  Laterality: N/A;    EYE SURGERY      Cataract bilateral    femoral stents      bilateral    FLAP PROCEDURE N/A 01/03/2022    Procedure: CREATION, FREE FLAP;   Surgeon: Naeem Smalls MD;  Location: [facility] OR [facility] FLR;  Service: ENT;  Laterality: N/A;    FLAP PROCEDURE Right 2022    Procedure: CREATION, FREE FLAP;  Surgeon: Stacey Conde MD;  Location: [facility] OR 2ND FLR;  Service: ENT;  Laterality: Right;  Ischemic start 1203  Ischemic stop 1353    GLOSSECTOMY N/A 2022    Procedure: GLOSSECTOMY;  Surgeon: Naeem Smalls MD;  Location: [facility] OR [facility] FLR;  Service: ENT;  Laterality: N/A;    LEFT HEART CATHETERIZATION Left 3/23/2023    Procedure: Left heart cath;  Surgeon: Tacos Benitez MD;  Location: [facility] CATH LAB;  Service: Cardiology;  Laterality: Left;    PERCUTANEOUS TRANSLUMINAL BALLOON ANGIOPLASTY OF CORONARY ARTERY Left 3/27/2023    Procedure: Angioplasty-coronary;  Surgeon: Tim Bhatt MD;  Location: [facility] CATH LAB;  Service: Cardiology;  Laterality: Left;  not before 9am, R rad access, 6 Fr EBU 3.5 guide    RADICAL NECK DISSECTION Left 2022    Procedure: DISSECTION, NECK, RADICAL;  Surgeon: Naeem Smalls MD;  [facility];  Service: ENT;  Laterality: Left;    SKIN BIOPSY      SKIN CANCER EXCISION      STENT, CORONARY, MULTI VESSEL  3/27/2023    Procedure: Stent, Coronary, Multi Vessel;  Surgeon: Tim Bhatt MD;  Location: [facility] CATH LAB;  Service: Cardiology;;    TRACHEOTOMY N/A 2022    Procedure: TRACHEOTOMY;  Surgeon: Naeem Smalls MD;  [facility];  Service: ENT;  Laterality: N/A;    VASCULAR SURGERY         Review of patient's allergies indicates:   Allergen Reactions    Ativan [lorazepam] Anxiety       Family History    None       Tobacco Use    Smoking status: Former     Current packs/day: 0.00     Average packs/day: 2.0 packs/day for 55.0 years (110.0 ttl pk-yrs)     Types: Cigarettes     Start date: 1963     Quit date: 2018     Years since quittin.7    Smokeless tobacco: Never    Tobacco comments:     3/3 ppd x 40 yrs. Currently 3-4 cigarettes daily .He is  trying  to quit. Is using a Vapor cigarettes  2-3 x's a day.   Substance and Sexual Activity    Alcohol use: Not Currently    Drug use: No    Sexual activity: Not Currently         Review of Systems   Constitutional:  Negative for activity change, chills and fever.   HENT:  Positive for congestion. Negative for postnasal drip.    Eyes: Negative.    Respiratory:  Positive for cough, chest tightness and shortness of breath.    Cardiovascular: Negative.    Gastrointestinal: Negative.    Endocrine: Negative.    Genitourinary: Negative.    Musculoskeletal: Negative.    Allergic/Immunologic: Negative.    Neurological: Negative.    Psychiatric/Behavioral:  Positive for agitation. The patient is nervous/anxious.      Objective:     Vital Signs (Most Recent):  Temp: 98.7 °F (37.1 °C) (01/19/24 1515)  Pulse: 85 (01/19/24 1600)  Resp: (!) 34 (01/19/24 1600)  BP: 138/80 (01/19/24 1600)  SpO2: (!) 92 % (01/19/24 1600) Vital Signs (24h Range):  Temp:  [98.1 °F (36.7 °C)-99 °F (37.2 °C)] 98.7 °F (37.1 °C)  Pulse:  [] 85  Resp:  [23-45] 34  SpO2:  [88 %-100 %] 92 %  BP: ()/(60-82) 138/80     Weight: 57.5 kg (126 lb 12.2 oz)  Body mass index is 18.72 kg/m².      Intake/Output Summary (Last 24 hours) at 1/19/2024 1635  Last data filed at 1/19/2024 1400  Gross per 24 hour   Intake 1069.17 ml   Output 1050 ml   Net 19.17 ml        Physical Exam  Constitutional:       Appearance: He is normal weight. He is ill-appearing.   HENT:      Head: Normocephalic and atraumatic.      Nose: Nose normal.      Mouth/Throat:      Mouth: Mucous membranes are moist.   Eyes:      Conjunctiva/sclera: Conjunctivae normal.      Pupils: Pupils are equal, round, and reactive to light.   Neck:      Comments: Trach in place with thick dark secretions   Cardiovascular:      Rate and Rhythm: Normal rate and regular rhythm.      Pulses: Normal pulses.   Pulmonary:      Effort: Tachypnea and accessory muscle usage present. No respiratory distress.       Breath sounds: Rhonchi present.   Abdominal:      General: Abdomen is flat. Bowel sounds are normal.   Musculoskeletal:         General: No swelling or deformity.   Skin:     General: Skin is warm.      Capillary Refill: Capillary refill takes less than 2 seconds.   Neurological:      General: No focal deficit present.      Mental Status: He is alert and oriented to person, place, and time.   Psychiatric:      Comments: Anxious            Vents:  Oxygen Concentration (%): 100 (01/19/24 1433)    Lines/Drains/Airways       Drain  Duration                  Gastrostomy/Enterostomy 01/04/22 Percutaneous endoscopic gastrostomy (PEG)  days    Male External Urinary Catheter 01/13/24 1800 5 days              Airway  Duration             Adult Surgical Airway 03/16/23 1014 Shiley Cuffed 6.0 309 days              Peripheral Intravenous Line  Duration                  Peripheral IV - Single Lumen 01/11/24 0013 20 G Right Antecubital 8 days         Peripheral IV - Single Lumen 01/15/24 1810 Anterior;Left;Proximal Forearm 3 days                    Significant Labs:    CBC/Anemia Profile:  Recent Labs   Lab 01/18/24  0453 01/19/24  0334   WBC 5.92 6.61   HGB 10.1* 9.3*   HCT 34.7* 32.3*    212   MCV 98 98   RDW 14.6* 14.6*        Chemistries:  Recent Labs   Lab 01/18/24  0453 01/19/24  0334    141   K 3.3* 3.7   CL 97 99   CO2 32* 34*   BUN 19 27*   CREATININE 0.9 1.0   CALCIUM 9.6 9.8   ALBUMIN 2.7* 2.5*   PROT 7.2 6.6   BILITOT 0.4 0.3   ALKPHOS 74 82   ALT 23 29   AST 27 40   MG 2.0 2.0       All pertinent labs within the past 24 hours have been reviewed.    Significant Imaging:   I have reviewed all pertinent imaging results/findings within the past 24 hours.

## 2024-01-19 NOTE — CONSULTS
[facility] Intensive Care  Critical Care Medicine  Consult Note    Patient Name: Fareed Richard Jr.  MRN: [id number]  Admission Date: 1/11/2024  Hospital Length of Stay: 8 days  Code Status: Full Code  Attending Physician: Santos Marlow, *   Primary Care Provider: Kodi Tubbs MD   Principal Problem: Acute on chronic respiratory failure with hypoxia    Inpatient consult to Pulmonology  Consult performed by: Alessandra Meza MD  Consult ordered by: Santos Marlow MD        Subjective:     HPI:  Mr. Richard is very well known to our team. 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 He was admitted a week ago with a new infection and completed antibiotic therapy on 1/17, however throughout the course of today he has had worsening secretions and increasing oxygen requirements. He has not had a fever but he feels like he does when his secretions are thick again.     Hospital/ICU Course:  No notes on file    Past Medical History:   Diagnosis Date    Cancer     skin to Rt forearm and face    COPD (chronic obstructive pulmonary disease)     Hyperlipidemia     Hypertension     Pseudoaneurysm        Past Surgical History:   Procedure Laterality Date    ABDOMINAL SURGERY      stents placed in liver and large intestines, per patient    ANGIOGRAPHY OF AORTIC ARCH  3/27/2023    Procedure: Aortogram, Aortic Arch;  Surgeon: Tim Bhatt MD;  Location: [facility] CATH LAB;  Service: Cardiology;;    AORTOGRAPHY WITH SERIALOGRAPHY N/A 3/27/2023    Procedure: AORTOGRAM, WITH SERIALOGRAPHY;  Surgeon: Tim Bhatt MD;  Location: [facility] CATH LAB;  Service: Cardiology;  Laterality: N/A;    CAROTID STENT      COLONOSCOPY N/A 09/27/2022    Procedure: COLONOSCOPY;  Surgeon: Donnie Peterson MD;  Location: [facility] ENDO ([address] [place] [address]);  Service: Endoscopy;  Laterality: N/A;    COLONOSCOPY N/A 09/30/2022    Procedure: COLONOSCOPY;  Surgeon: Joy Cabrera MD;   Location: [facility] ENDO (2ND FLR);  Service: Endoscopy;  Laterality: N/A;    CORONARY STENT PLACEMENT  01/2000    DISSECTION OF NECK Bilateral 01/04/2022    Procedure: DISSECTION, NECK;  Surgeon: Naeem Smalls MD;  [facility];  Service: ENT;  Laterality: Bilateral;    ESOPHAGOGASTRODUODENOSCOPY N/A 09/27/2022    Procedure: EGD (ESOPHAGOGASTRODUODENOSCOPY);  Surgeon: Donnie Peterson MD;  Location: [facility] ENDO (2ND FLR);  Service: Endoscopy;  Laterality: N/A;    ESOPHAGOGASTRODUODENOSCOPY N/A 09/30/2022    Procedure: EGD (ESOPHAGOGASTRODUODENOSCOPY);  Surgeon: Joy Cabrera MD;  Location: [facility] ENDO (2ND FLR);  Service: Endoscopy;  Laterality: N/A;    ESOPHAGOGASTRODUODENOSCOPY W/ PEG N/A 01/04/2022    Procedure: EGD, WITH PEG TUBE INSERTION;  Surgeon: Anthony Calabrese MD;  [facility];  Service: General;  Laterality: N/A;    EYE SURGERY      Cataract bilateral    femoral stents      bilateral    FLAP PROCEDURE N/A 01/03/2022    Procedure: CREATION, FREE FLAP;  Surgeon: Naeem Smalls MD;  [facility];  Service: ENT;  Laterality: N/A;    FLAP PROCEDURE Right 01/04/2022    Procedure: CREATION, FREE FLAP;  Surgeon: Stacey Conde MD;  [facility];  Service: ENT;  Laterality: Right;  Ischemic start 1203  Ischemic stop 1353    GLOSSECTOMY N/A 01/04/2022    Procedure: GLOSSECTOMY;  Surgeon: Naeem Smalls MD;  [facility];  Service: ENT;  Laterality: N/A;    LEFT HEART CATHETERIZATION Left 3/23/2023    Procedure: Left heart cath;  Surgeon: Tacos Benitez MD;  Location: [facility] CATH LAB;  Service: Cardiology;  Laterality: Left;    PERCUTANEOUS TRANSLUMINAL BALLOON ANGIOPLASTY OF CORONARY ARTERY Left 3/27/2023    Procedure: Angioplasty-coronary;  Surgeon: Tim Bhatt MD;  Location: [facility] CATH LAB;  Service: Cardiology;  Laterality: Left;  not before 9am, R rad access, 6 Fr EBU 3.5 guide    RADICAL NECK DISSECTION Left 01/03/2022     Procedure: DISSECTION, NECK, RADICAL;  Surgeon: Naeem Smalls MD;  [facility];  Service: ENT;  Laterality: Left;    SKIN BIOPSY      SKIN CANCER EXCISION      STENT, CORONARY, MULTI VESSEL  3/27/2023    Procedure: Stent, Coronary, Multi Vessel;  Surgeon: Tim Bhatt MD;  Location: [facility] CATH LAB;  Service: Cardiology;;    TRACHEOTOMY N/A 2022    Procedure: TRACHEOTOMY;  Surgeon: Naeem Smalls MD;  [facility];  Service: ENT;  Laterality: N/A;    VASCULAR SURGERY         Review of patient's allergies indicates:   Allergen Reactions    Ativan [lorazepam] Anxiety       Family History    None       Tobacco Use    Smoking status: Former     Current packs/day: 0.00     Average packs/day: 2.0 packs/day for 55.0 years (110.0 ttl pk-yrs)     Types: Cigarettes     Start date: 1963     Quit date: 2018     Years since quittin.7    Smokeless tobacco: Never    Tobacco comments:     3/3 ppd x 40 yrs. Currently 3-4 cigarettes daily .He is trying  to quit. Is using a Vapor cigarettes  2-3 x's a day.   Substance and Sexual Activity    Alcohol use: Not Currently    Drug use: No    Sexual activity: Not Currently         Review of Systems   Constitutional:  Negative for activity change, chills and fever.   HENT:  Positive for congestion. Negative for postnasal drip.    Eyes: Negative.    Respiratory:  Positive for cough, chest tightness and shortness of breath.    Cardiovascular: Negative.    Gastrointestinal: Negative.    Endocrine: Negative.    Genitourinary: Negative.    Musculoskeletal: Negative.    Allergic/Immunologic: Negative.    Neurological: Negative.    Psychiatric/Behavioral:  Positive for agitation. The patient is nervous/anxious.      Objective:     Vital Signs (Most Recent):  Temp: 98.7 °F (37.1 °C) (24 1515)  Pulse: 85 (24 1600)  Resp: (!) 34 (24 1600)  BP: 138/80 (24 1600)  SpO2: (!) 92 % (24 1600) Vital Signs (24h  Range):  Temp:  [98.1 °F (36.7 °C)-99 °F (37.2 °C)] 98.7 °F (37.1 °C)  Pulse:  [] 85  Resp:  [23-45] 34  SpO2:  [88 %-100 %] 92 %  BP: ()/(60-82) 138/80     Weight: 57.5 kg (126 lb 12.2 oz)  Body mass index is 18.72 kg/m².      Intake/Output Summary (Last 24 hours) at 1/19/2024 1635  Last data filed at 1/19/2024 1400  Gross per 24 hour   Intake 1069.17 ml   Output 1050 ml   Net 19.17 ml        Physical Exam  Constitutional:       Appearance: He is normal weight. He is ill-appearing.   HENT:      Head: Normocephalic and atraumatic.      Nose: Nose normal.      Mouth/Throat:      Mouth: Mucous membranes are moist.   Eyes:      Conjunctiva/sclera: Conjunctivae normal.      Pupils: Pupils are equal, round, and reactive to light.   Neck:      Comments: Trach in place with thick dark secretions   Cardiovascular:      Rate and Rhythm: Normal rate and regular rhythm.      Pulses: Normal pulses.   Pulmonary:      Effort: Tachypnea and accessory muscle usage present. No respiratory distress.      Breath sounds: Rhonchi present.   Abdominal:      General: Abdomen is flat. Bowel sounds are normal.   Musculoskeletal:         General: No swelling or deformity.   Skin:     General: Skin is warm.      Capillary Refill: Capillary refill takes less than 2 seconds.   Neurological:      General: No focal deficit present.      Mental Status: He is alert and oriented to person, place, and time.   Psychiatric:      Comments: Anxious            Vents:  Oxygen Concentration (%): 100 (01/19/24 1433)    Lines/Drains/Airways       Drain  Duration                  Gastrostomy/Enterostomy 01/04/22 Percutaneous endoscopic gastrostomy (PEG)  days    Male External Urinary Catheter 01/13/24 1800 5 days              Airway  Duration             Adult Surgical Airway 03/16/23 1014 Shiley Cuffed 6.0 309 days              Peripheral Intravenous Line  Duration                  Peripheral IV - Single Lumen 01/11/24 0013 20 G Right  Antecubital 8 days         Peripheral IV - Single Lumen 01/15/24 1810 Anterior;Left;Proximal Forearm 3 days                    Significant Labs:    CBC/Anemia Profile:  Recent Labs   Lab 01/18/24  0453 01/19/24  0334   WBC 5.92 6.61   HGB 10.1* 9.3*   HCT 34.7* 32.3*    212   MCV 98 98   RDW 14.6* 14.6*        Chemistries:  Recent Labs   Lab 01/18/24  0453 01/19/24  0334    141   K 3.3* 3.7   CL 97 99   CO2 32* 34*   BUN 19 27*   CREATININE 0.9 1.0   CALCIUM 9.6 9.8   ALBUMIN 2.7* 2.5*   PROT 7.2 6.6   BILITOT 0.4 0.3   ALKPHOS 74 82   ALT 23 29   AST 27 40   MG 2.0 2.0       All pertinent labs within the past 24 hours have been reviewed.    Significant Imaging:   I have reviewed all pertinent imaging results/findings within the past 24 hours.    ABG  Recent Labs   Lab 01/18/24 2118   PH 7.397   PO2 58*   PCO2 58.2*   HCO3 35.8*   BE 9*     Assessment/Plan:     Pulmonary  * Acute on chronic respiratory failure with hypoxia  Worsening hypoxia throughout the day  Concern for worsening infection again- restart zosyn, repeat sputum culture, adding inhaled tobramycin for chronic pseudomonas infection  May need bipap support pending his response to CPT and nebs tonight.   Bipap can be without a cuff- no need for change of his trach for bipap       Ventilator associated pneumonia  He has not been on the ventilator so unsure that he has a VAP at this point./     COPD exacerbation  Not concerned for acute COPD exacerbation- more likely pneumonia      Critical Care Time: 35 minutes  Critical secondary to Patient has a condition that poses threat to life and bodily function: Severe Respiratory Distress     Critical care was time spent personally by me on the following activities: development of treatment plan with patient or surrogate and bedside caregivers, discussions with consultants, evaluation of patient's response to treatment, examination of patient, ordering and performing treatments and interventions,  ordering and review of laboratory studies, ordering and review of radiographic studies, pulse oximetry, re-evaluation of patient's condition. This critical care time did not overlap with that of any other provider or involve time for any procedures.    Thank you for your consult. I will follow-up with patient. Please contact us if you have any additional questions.     Alessandra Meza MD  Critical Care Medicine  [facility] - Intensive Care

## 2024-01-19 NOTE — SUBJECTIVE & OBJECTIVE
Interval History:Has Afib with RVR,on floor,was send to ICU on amiodarone gtt,HR is improved.  But on 98% FIO2,  Review of Systems  Objective:     Vital Signs (Most Recent):  Temp: 98.7 °F (37.1 °C) (01/19/24 0701)  Pulse: 74 (01/19/24 1045)  Resp: (!) 30 (01/19/24 1045)  BP: 131/76 (01/19/24 1045)  SpO2: 98 % (01/19/24 1045) Vital Signs (24h Range):  Temp:  [98 °F (36.7 °C)-99 °F (37.2 °C)] 98.7 °F (37.1 °C)  Pulse:  [] 74  Resp:  [23-40] 30  SpO2:  [89 %-100 %] 98 %  BP: ()/(61-82) 131/76     Weight: 57.5 kg (126 lb 12.2 oz)  Body mass index is 18.72 kg/m².    Intake/Output Summary (Last 24 hours) at 1/19/2024 1145  Last data filed at 1/19/2024 1056  Gross per 24 hour   Intake 1079.79 ml   Output 850 ml   Net 229.79 ml           Physical Exam  Vitals reviewed.   Constitutional:       General: He is not in acute distress.     Appearance: He is ill-appearing.   HENT:      Head: Normocephalic and atraumatic.      Mouth/Throat:      Mouth: Mucous membranes are dry.   Eyes:      General: No scleral icterus.     Extraocular Movements: Extraocular movements intact.   Cardiovascular:      Rate and Rhythm: Normal rate. Rhythm irregular.   Pulmonary:      Effort: Pulmonary effort is normal. Respiratory distress: course respirations.   Abdominal:      Palpations: Abdomen is soft.      Tenderness: There is no abdominal tenderness.   Musculoskeletal:         General: No swelling or tenderness.      Cervical back: Neck supple. No rigidity.   Skin:     General: Skin is warm and dry.   Neurological:      General: No focal deficit present.      Mental Status: He is alert and oriented to person, place, and time.   Psychiatric:         Mood and Affect: Mood normal.         Behavior: Behavior normal.             Significant Labs: All pertinent labs within the past 24 hours have been reviewed.    Significant Imaging: I have reviewed all pertinent imaging results/findings within the past 24 hours.

## 2024-01-19 NOTE — ASSESSMENT & PLAN NOTE
Worsening hypoxia throughout the day  Concern for worsening infection again- restart zosyn, repeat sputum culture, adding inhaled tobramycin for chronic pseudomonas infection  May need bipap support pending his response to CPT and nebs tonight.   Bipap can be without a cuff- no need for change of his trach for bipap

## 2024-01-19 NOTE — ASSESSMENT & PLAN NOTE
Patient admitted with Hypoxic which is Acute on Chronic.  he is on home oxygen at 6 LPM trach collar. Signs/symptoms of respiratory failure include- increased work of breathing and respiratory distress present on admission.   - Labs and images were reviewed. Patient Has not has a recent ABG.   - Supplemental oxygen was provided and noted-  trach collar    - Respiratory failure is due to- CHF and Pneumonia   - CT with RML PNA. Trach aspirate suspected Pseudomonas. Continue vanc, zosyn for now- likely can drop vanc tomorrow   - BNP higher than ever before. TTE normal EF. NSTEMI present. Continue lasix 40mg IV daily- can likely change to PO tomorrow  - CTA no PE  - improving from admit  Consulted ID for Abx management. Continuing with Zosyn  On high FIO2 ,continue with HFO and frequent suctioning,pulmonology is on case,    Still on 98% FIO2,started on CPT and acetylcysteine nebulizer,consulted pulmonology.

## 2024-01-20 LAB
ALBUMIN SERPL BCP-MCNC: 2.6 G/DL (ref 3.5–5.2)
ALP SERPL-CCNC: 81 U/L (ref 55–135)
ALT SERPL W/O P-5'-P-CCNC: 33 U/L (ref 10–44)
ANION GAP SERPL CALC-SCNC: 7 MMOL/L (ref 8–16)
AST SERPL-CCNC: 42 U/L (ref 10–40)
BASOPHILS # BLD AUTO: 0.02 K/UL (ref 0–0.2)
BASOPHILS NFR BLD: 0.3 % (ref 0–1.9)
BILIRUB SERPL-MCNC: 0.3 MG/DL (ref 0.1–1)
BUN SERPL-MCNC: 29 MG/DL (ref 8–23)
CALCIUM SERPL-MCNC: 10.1 MG/DL (ref 8.7–10.5)
CHLORIDE SERPL-SCNC: 99 MMOL/L (ref 95–110)
CO2 SERPL-SCNC: 40 MMOL/L (ref 23–29)
CREAT SERPL-MCNC: 1.1 MG/DL (ref 0.5–1.4)
DIFFERENTIAL METHOD BLD: ABNORMAL
EOSINOPHIL # BLD AUTO: 0 K/UL (ref 0–0.5)
EOSINOPHIL NFR BLD: 0.3 % (ref 0–8)
ERYTHROCYTE [DISTWIDTH] IN BLOOD BY AUTOMATED COUNT: 14.6 % (ref 11.5–14.5)
EST. GFR  (NO RACE VARIABLE): >60 ML/MIN/1.73 M^2
GLUCOSE SERPL-MCNC: 157 MG/DL (ref 70–110)
HCT VFR BLD AUTO: 37.6 % (ref 40–54)
HGB BLD-MCNC: 10.5 G/DL (ref 14–18)
IMM GRANULOCYTES # BLD AUTO: 0.03 K/UL (ref 0–0.04)
IMM GRANULOCYTES NFR BLD AUTO: 0.4 % (ref 0–0.5)
LYMPHOCYTES # BLD AUTO: 0.5 K/UL (ref 1–4.8)
LYMPHOCYTES NFR BLD: 7.4 % (ref 18–48)
MAGNESIUM SERPL-MCNC: 1.9 MG/DL (ref 1.6–2.6)
MCH RBC QN AUTO: 28.7 PG (ref 27–31)
MCHC RBC AUTO-ENTMCNC: 27.9 G/DL (ref 32–36)
MCV RBC AUTO: 103 FL (ref 82–98)
MONOCYTES # BLD AUTO: 0.6 K/UL (ref 0.3–1)
MONOCYTES NFR BLD: 8.2 % (ref 4–15)
NEUTROPHILS # BLD AUTO: 6.1 K/UL (ref 1.8–7.7)
NEUTROPHILS NFR BLD: 83.4 % (ref 38–73)
NRBC BLD-RTO: 0 /100 WBC
PLATELET # BLD AUTO: 236 K/UL (ref 150–450)
PMV BLD AUTO: 10.1 FL (ref 9.2–12.9)
POCT GLUCOSE: 128 MG/DL (ref 70–110)
POCT GLUCOSE: 177 MG/DL (ref 70–110)
POCT GLUCOSE: 213 MG/DL (ref 70–110)
POTASSIUM SERPL-SCNC: 5.5 MMOL/L (ref 3.5–5.1)
PROT SERPL-MCNC: 7.3 G/DL (ref 6–8.4)
RBC # BLD AUTO: 3.66 M/UL (ref 4.6–6.2)
SODIUM SERPL-SCNC: 146 MMOL/L (ref 136–145)
WBC # BLD AUTO: 7.3 K/UL (ref 3.9–12.7)

## 2024-01-20 PROCEDURE — 94640 AIRWAY INHALATION TREATMENT: CPT

## 2024-01-20 PROCEDURE — 25000003 PHARM REV CODE 250: Performed by: STUDENT IN AN ORGANIZED HEALTH CARE EDUCATION/TRAINING PROGRAM

## 2024-01-20 PROCEDURE — 25000003 PHARM REV CODE 250: Performed by: HOSPITALIST

## 2024-01-20 PROCEDURE — 94761 N-INVAS EAR/PLS OXIMETRY MLT: CPT

## 2024-01-20 PROCEDURE — 94660 CPAP INITIATION&MGMT: CPT

## 2024-01-20 PROCEDURE — 94668 MNPJ CHEST WALL SBSQ: CPT

## 2024-01-20 PROCEDURE — 83735 ASSAY OF MAGNESIUM: CPT | Performed by: HOSPITALIST

## 2024-01-20 PROCEDURE — 80053 COMPREHEN METABOLIC PANEL: CPT | Performed by: HOSPITALIST

## 2024-01-20 PROCEDURE — 63600175 PHARM REV CODE 636 W HCPCS: Performed by: INTERNAL MEDICINE

## 2024-01-20 PROCEDURE — 25000242 PHARM REV CODE 250 ALT 637 W/ HCPCS: Performed by: HOSPITALIST

## 2024-01-20 PROCEDURE — 63600175 PHARM REV CODE 636 W HCPCS: Performed by: HOSPITALIST

## 2024-01-20 PROCEDURE — 36415 COLL VENOUS BLD VENIPUNCTURE: CPT | Performed by: HOSPITALIST

## 2024-01-20 PROCEDURE — 25000003 PHARM REV CODE 250: Performed by: INTERNAL MEDICINE

## 2024-01-20 PROCEDURE — 85025 COMPLETE CBC W/AUTO DIFF WBC: CPT | Performed by: HOSPITALIST

## 2024-01-20 PROCEDURE — 20000000 HC ICU ROOM

## 2024-01-20 PROCEDURE — 99900035 HC TECH TIME PER 15 MIN (STAT)

## 2024-01-20 PROCEDURE — 25000242 PHARM REV CODE 250 ALT 637 W/ HCPCS: Performed by: INTERNAL MEDICINE

## 2024-01-20 PROCEDURE — 27000221 HC OXYGEN, UP TO 24 HOURS

## 2024-01-20 PROCEDURE — 99900026 HC AIRWAY MAINTENANCE (STAT)

## 2024-01-20 PROCEDURE — 99291 CRITICAL CARE FIRST HOUR: CPT | Mod: ,,, | Performed by: INTERNAL MEDICINE

## 2024-01-20 RX ORDER — SODIUM CHLORIDE 450 MG/100ML
INJECTION, SOLUTION INTRAVENOUS CONTINUOUS
Status: DISCONTINUED | OUTPATIENT
Start: 2024-01-20 | End: 2024-01-21

## 2024-01-20 RX ORDER — MIDODRINE HYDROCHLORIDE 5 MG/1
10 TABLET ORAL 3 TIMES DAILY
Status: DISCONTINUED | OUTPATIENT
Start: 2024-01-20 | End: 2024-01-21

## 2024-01-20 RX ADMIN — ACETAMINOPHEN 650 MG: 325 TABLET ORAL at 02:01

## 2024-01-20 RX ADMIN — APIXABAN 2.5 MG: 2.5 TABLET, FILM COATED ORAL at 09:01

## 2024-01-20 RX ADMIN — ACETYLCYSTEINE 4 ML: 100 INHALANT RESPIRATORY (INHALATION) at 08:01

## 2024-01-20 RX ADMIN — FOLIC ACID 1 MG: 1 TABLET ORAL at 09:01

## 2024-01-20 RX ADMIN — ASPIRIN 81 MG CHEWABLE TABLET 81 MG: 81 TABLET CHEWABLE at 09:01

## 2024-01-20 RX ADMIN — MIDODRINE HYDROCHLORIDE 10 MG: 5 TABLET ORAL at 09:01

## 2024-01-20 RX ADMIN — PIPERACILLIN AND TAZOBACTAM 4.5 G: 4; .5 INJECTION, POWDER, LYOPHILIZED, FOR SOLUTION INTRAVENOUS; PARENTERAL at 09:01

## 2024-01-20 RX ADMIN — AMIODARONE HYDROCHLORIDE 200 MG: 200 TABLET ORAL at 09:01

## 2024-01-20 RX ADMIN — TOBRAMYCIN 300 MG: 300 SOLUTION RESPIRATORY (INHALATION) at 08:01

## 2024-01-20 RX ADMIN — LEVALBUTEROL 1.25 MG: 1.25 SOLUTION, CONCENTRATE RESPIRATORY (INHALATION) at 02:01

## 2024-01-20 RX ADMIN — SODIUM CHLORIDE 500 ML: 9 INJECTION, SOLUTION INTRAVENOUS at 03:01

## 2024-01-20 RX ADMIN — SODIUM CHLORIDE: 4.5 INJECTION, SOLUTION INTRAVENOUS at 04:01

## 2024-01-20 RX ADMIN — FUROSEMIDE 20 MG: 20 TABLET ORAL at 09:01

## 2024-01-20 RX ADMIN — MIDODRINE HYDROCHLORIDE 10 MG: 5 TABLET ORAL at 03:01

## 2024-01-20 RX ADMIN — PIPERACILLIN AND TAZOBACTAM 4.5 G: 4; .5 INJECTION, POWDER, LYOPHILIZED, FOR SOLUTION INTRAVENOUS; PARENTERAL at 06:01

## 2024-01-20 RX ADMIN — ATORVASTATIN CALCIUM 40 MG: 40 TABLET, FILM COATED ORAL at 09:01

## 2024-01-20 RX ADMIN — INSULIN ASPART 2 UNITS: 100 INJECTION, SOLUTION INTRAVENOUS; SUBCUTANEOUS at 06:01

## 2024-01-20 RX ADMIN — SODIUM ZIRCONIUM CYCLOSILICATE 10 G: 10 POWDER, FOR SUSPENSION ORAL at 11:01

## 2024-01-20 RX ADMIN — ACETYLCYSTEINE 4 ML: 100 INHALANT RESPIRATORY (INHALATION) at 02:01

## 2024-01-20 RX ADMIN — CLOPIDOGREL BISULFATE 75 MG: 75 TABLET ORAL at 09:01

## 2024-01-20 RX ADMIN — ACETAMINOPHEN 650 MG: 325 TABLET ORAL at 07:01

## 2024-01-20 RX ADMIN — LEVALBUTEROL 1.25 MG: 1.25 SOLUTION, CONCENTRATE RESPIRATORY (INHALATION) at 08:01

## 2024-01-20 RX ADMIN — FLUOXETINE HYDROCHLORIDE 20 MG: 20 SOLUTION ORAL at 09:01

## 2024-01-20 RX ADMIN — LEVALBUTEROL 1.25 MG: 1.25 SOLUTION, CONCENTRATE RESPIRATORY (INHALATION) at 07:01

## 2024-01-20 RX ADMIN — ALPRAZOLAM 0.5 MG: 0.5 TABLET ORAL at 09:01

## 2024-01-20 RX ADMIN — HYDROXYZINE HYDROCHLORIDE 25 MG: 25 TABLET ORAL at 07:01

## 2024-01-20 RX ADMIN — PIPERACILLIN AND TAZOBACTAM 4.5 G: 4; .5 INJECTION, POWDER, LYOPHILIZED, FOR SOLUTION INTRAVENOUS; PARENTERAL at 02:01

## 2024-01-20 RX ADMIN — METOPROLOL TARTRATE 25 MG: 25 TABLET, FILM COATED ORAL at 09:01

## 2024-01-20 RX ADMIN — ACETYLCYSTEINE 4 ML: 100 INHALANT RESPIRATORY (INHALATION) at 07:01

## 2024-01-20 NOTE — NURSING
[facility] Intensive Care  ICU Shift Summary  Date: 1/20/2024      Prehospitalization: Home  Admit Date / LOS : 1/11/2024/ 9 days    Diagnosis: Acute on chronic respiratory failure with hypoxia    Consults:        Active: Cardio and Pulm CC       Needed: Spiritual Care     Code Status: DNR   Advanced Directive: Not Received    LDA:  Lines/Drains/Airways       Drain  Duration                  Gastrostomy/Enterostomy 01/04/22 Percutaneous endoscopic gastrostomy (PEG)  days    Male External Urinary Catheter 01/13/24 1800 6 days              Airway  Duration             Adult Surgical Airway 03/16/23 1014 Shiley Cuffed 6.0 310 days              Peripheral Intravenous Line  Duration                  Peripheral IV - Single Lumen 01/11/24 0013 20 G Right Antecubital 9 days         Peripheral IV - Single Lumen 01/15/24 1810 Anterior;Left;Proximal Forearm 4 days                  Central Lines/Site/Justification:Patient Does Not Have Central Line  Urinary Cath/Order/Justification:Patient Does Not Have Urinary Catheter    Vasopressors/Infusions:    sodium chloride 0.45% 100 mL/hr at 01/20/24 1700          GOALS: Volume/ Hemodynamic: N/A                     RASS: N/A    Pain Management:  NGT       Pain Controlled: yes     Rhythm: NSR    Respiratory Device: Bipap    Oxygen Concentration (%):  [] 40                 Most Recent SBT/ SAT: N/A       MOVE Screen: FAIL  ICU Liberation: no    VTE Prophylaxis: Pharm  Mobility: Bedrest  Stress Ulcer Prophylaxis: Yes    Isolation: No active isolations    Dietary:   Current Diet Order   Procedures    Diet NPO Except for: Sips with Medication     Order Specific Question:   Except for     Answer:   Sips with Medication      Tolerance: yes  Advancement: @ goal    I & O (24h):    Intake/Output Summary (Last 24 hours) at 1/20/2024 1756  Last data filed at 1/20/2024 1700  Gross per 24 hour   Intake 2393.56 ml   Output 900 ml   Net 1493.56 ml        Restraints: No    Significant  "Dates:  Post Op Date: N/A  Rescue Date: N/A  Imaging/ Diagnostics: N/A    Noteworthy Labs:  none    COVID Test: (--)  CBC/Anemia Labs: Coags:    Recent Labs   Lab 01/19/24 0334 01/20/24  0421   WBC 6.61 7.30   HGB 9.3* 10.5*   HCT 32.3* 37.6*    236   MCV 98 103*   RDW 14.6* 14.6*    Recent Labs   Lab 01/16/24  0445 01/17/24  0358   APTT 26.6 26.5        Chemistries:   Recent Labs   Lab 01/19/24 0334 01/20/24  0421    146*   K 3.7 5.5*   CL 99 99   CO2 34* 40*   BUN 27* 29*   CREATININE 1.0 1.1   CALCIUM 9.8 10.1   PROT 6.6 7.3   BILITOT 0.3 0.3   ALKPHOS 82 81   ALT 29 33   AST 40 42*   MG 2.0 1.9        Cardiac Enzymes: Ejection Fractions:    No results for input(s): "CPK", "CPKMB", "MB", "TROPONINI" in the last 72 hours. EF   Date Value Ref Range Status   03/20/2023 60 % Final        POCT Glucose: HbA1c:    Recent Labs   Lab 01/19/24  1242 01/19/24  1729 01/20/24  1254   POCTGLUCOSE 178* 128* 177*    Hemoglobin A1C   Date Value Ref Range Status   08/29/2023 5.8 (H) 4.0 - 5.6 % Final     Comment:     ADA Screening Guidelines:  5.7-6.4%  Consistent with prediabetes  >or=6.5%  Consistent with diabetes    High levels of fetal hemoglobin interfere with the HbA1C  assay. Heterozygous hemoglobin variants (HbS, HgC, etc)do  not significantly interfere with this assay.   However, presence of multiple variants may affect accuracy.             ICU LOS 2d 9h  Level of Care: Critical Care    Chart Check: 12 HR Done  Shift Summary/Plan for the shift: Remains in ICU on BiPap throughout shift.  BP soft this shift; midodrine started, 500mL bolus admin per orders.  Quivie cath in place with adequate UOP this shift.  Pt c/o pain X 1, tylenol admin and effective.  Code status changed to DNR this shift.  POC reviewed with pt and spouse; expressed understanding.    "

## 2024-01-20 NOTE — ASSESSMENT & PLAN NOTE
I spoke with Ms. Bridgett at Mr. Richard is bedside.  She is aware that he looks much worse today than he has and she even brought up that he has been much sicker this hospitalization that any of the previous.  She is worried about him continuing to suffer and has discussed this with both of her children who agree with her.  At this point because Mr. Richard is unable to participate his family has opted to change his code status to DNR. They wish to continue current treatment but also want him to not suffer and be as comfortable as possible. IF he is not improving from this infection they will discuss hospice care.

## 2024-01-20 NOTE — PROGRESS NOTES
[facility] Intensive Care  Critical Care Medicine  Progress Note    Patient Name: Fareed Richard Jr.  MRN: [id number]  Admission Date: 1/11/2024  Hospital Length of Stay: 9 days  Code Status: DNR  Attending Provider: Santos Marlow, *  Primary Care Provider: Kodi Tubbs MD   Principal Problem: Acute on chronic respiratory failure with hypoxia    Subjective:     HPI:  Mr. Richard is very well known to our team. 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 He was admitted a week ago with a new infection and completed antibiotic therapy on 1/17, however throughout the course of today he has had worsening secretions and increasing oxygen requirements. He has not had a fever but he feels like he does when his secretions are thick again.     Hospital/ICU Course:  No notes on file    Interval History: Mr. Richard is less responsive today and was on bipap overnight with continued worsening secretions.       Objective:     Vital Signs (Most Recent):  Temp: 98.9 °F (37.2 °C) (01/20/24 0701)  Pulse: 81 (01/20/24 1000)  Resp: (!) 31 (01/20/24 1000)  BP: 111/62 (01/20/24 1000)  SpO2: 100 % (01/20/24 1000) Vital Signs (24h Range):  Temp:  [98.7 °F (37.1 °C)-100.1 °F (37.8 °C)] 98.9 °F (37.2 °C)  Pulse:  [] 81  Resp:  [22-45] 31  SpO2:  [88 %-100 %] 100 %  BP: ()/(53-84) 111/62     Weight: 57.5 kg (126 lb 12.2 oz)  Body mass index is 18.72 kg/m².      Intake/Output Summary (Last 24 hours) at 1/20/2024 1201  Last data filed at 1/20/2024 0701  Gross per 24 hour   Intake 1155.24 ml   Output 600 ml   Net 555.24 ml        Physical Exam  Constitutional:       Appearance: He is normal weight. He is ill-appearing.   HENT:      Head: Normocephalic and atraumatic.      Nose: Nose normal.      Mouth/Throat:      Mouth: Mucous membranes are moist.   Eyes:      Conjunctiva/sclera: Conjunctivae normal.      Pupils: Pupils are equal, round, and  reactive to light.   Neck:      Comments: Trach in place with thick dark secretions   Cardiovascular:      Rate and Rhythm: Normal rate and regular rhythm.      Pulses: Normal pulses.   Pulmonary:      Effort: Tachypnea and accessory muscle usage present. No respiratory distress.      Breath sounds: Rhonchi present.   Abdominal:      General: Abdomen is flat. Bowel sounds are normal.   Musculoskeletal:         General: No swelling or deformity.   Skin:     General: Skin is warm.      Capillary Refill: Capillary refill takes less than 2 seconds.   Neurological:      General: No focal deficit present.      Mental Status: He is alert and oriented to person, place, and time.   Psychiatric:      Comments: Anxious             Review of Systems    Vents:  Oxygen Concentration (%): 50 (01/20/24 0818)    Lines/Drains/Airways       Drain  Duration                  Gastrostomy/Enterostomy 01/04/22 Percutaneous endoscopic gastrostomy (PEG)  days    Male External Urinary Catheter 01/13/24 1800 6 days              Airway  Duration             Adult Surgical Airway 03/16/23 1014 Shiley Cuffed 6.0 310 days              Peripheral Intravenous Line  Duration                  Peripheral IV - Single Lumen 01/11/24 0013 20 G Right Antecubital 9 days         Peripheral IV - Single Lumen 01/15/24 1810 Anterior;Left;Proximal Forearm 4 days                    Significant Labs:    CBC/Anemia Profile:  Recent Labs   Lab 01/19/24  0334 01/20/24  0421   WBC 6.61 7.30   HGB 9.3* 10.5*   HCT 32.3* 37.6*    236   MCV 98 103*   RDW 14.6* 14.6*        Chemistries:  Recent Labs   Lab 01/19/24  0334 01/20/24  0421    146*   K 3.7 5.5*   CL 99 99   CO2 34* 40*   BUN 27* 29*   CREATININE 1.0 1.1   CALCIUM 9.8 10.1   ALBUMIN 2.5* 2.6*   PROT 6.6 7.3   BILITOT 0.3 0.3   ALKPHOS 82 81   ALT 29 33   AST 40 42*   MG 2.0 1.9       All pertinent labs within the past 24 hours have been reviewed.    Significant Imaging:  I have reviewed all  pertinent imaging results/findings within the past 24 hours.    ABG  Recent Labs   Lab 01/18/24  2118   PH 7.397   PO2 58*   PCO2 58.2*   HCO3 35.8*   BE 9*     Assessment/Plan:     Pulmonary  * Acute on chronic respiratory failure with hypoxia  Worsening hypoxia throughout the day  Concern for worsening infection again- restart zosyn, repeat sputum culture, adding inhaled tobramycin for chronic pseudomonas infection  Continue bipap support continuously for now.   Bipap can be without a cuff- no need for change of his trach for bipap       Ventilator associated pneumonia  He has not been on the ventilator so unsure that he has a VAP at this point./     COPD exacerbation  Not concerned for acute COPD exacerbation- more likely pneumonia and now requiring bipap support.     Palliative Care  ACP (advance care planning)  I spoke with Ms. Urias at Mr. Richard is bedside.  She is aware that he looks much worse today than he has and she even brought up that he has been much sicker this hospitalization that any of the previous.  She is worried about him continuing to suffer and has discussed this with both of her children who agree with her.  At this point because Mr. Richard is unable to participate his family has opted to change his code status to DNR. They wish to continue current treatment but also want him to not suffer and be as comfortable as possible. IF he is not improving from this infection they will discuss hospice care.         Critical Care Time: 50 minutes  Critical secondary to Patient has a condition that poses threat to life and bodily function: Severe Respiratory Distress      Critical care was time spent personally by me on the following activities: development of treatment plan with patient or surrogate and bedside caregivers, discussions with consultants, evaluation of patient's response to treatment, examination of patient, ordering and performing treatments and interventions, ordering and review of  laboratory studies, ordering and review of radiographic studies, pulse oximetry, re-evaluation of patient's condition. This critical care time did not overlap with that of any other provider or involve time for any procedures.     Alessandra Meza MD  Critical Care Medicine  [facility] - Intensive Care

## 2024-01-20 NOTE — NURSING
[facility] Intensive Care  ICU Shift Summary  Date: 1/19/2024      Prehospitalization: Home  Admit Date / LOS : 1/11/2024/ 8 days    Diagnosis: Acute on chronic respiratory failure with hypoxia    Consults:        Active: Cardio, Gen Surg, ID, and Pulm CC       Needed: N/A     Code Status: Full Code   Advanced Directive: Not Received    LDA:  Lines/Drains/Airways       Drain  Duration                  Gastrostomy/Enterostomy 01/04/22 Percutaneous endoscopic gastrostomy (PEG)  days    Male External Urinary Catheter 01/13/24 1800 6 days              Airway  Duration             Adult Surgical Airway 03/16/23 1014 Shiley Cuffed 6.0 309 days              Peripheral Intravenous Line  Duration                  Peripheral IV - Single Lumen 01/11/24 0013 20 G Right Antecubital 8 days         Peripheral IV - Single Lumen 01/15/24 1810 Anterior;Left;Proximal Forearm 4 days                  Central Lines/Site/Justification:Patient Does Not Have Central Line  Urinary Cath/Order/Justification:Patient Does Not Have Urinary Catheter    Vasopressors/Infusions:        GOALS: Volume/ Hemodynamic: N/A                     RASS: 0  alert and calm    Pain Management: none       Pain Controlled: yes     Rhythm: NSR and ST    Respiratory Device: HFNC  trach collar  Oxygen Concentration (%):  [] 100                 Most Recent SBT/ SAT: N/A       MOVE Screen: FAIL  ICU Liberation: no    VTE Prophylaxis: Pharm  Mobility: Bedrest  Stress Ulcer Prophylaxis: Yes    Isolation: No active isolations    Dietary:   Current Diet Order   Procedures    Diet NPO Except for: Sips with Medication     Order Specific Question:   Except for     Answer:   Sips with Medication      Tolerance: yes  Advancement: @ goal    I & O (24h):    Intake/Output Summary (Last 24 hours) at 1/19/2024 1825  Last data filed at 1/19/2024 1600  Gross per 24 hour   Intake 1069.17 ml   Output 950 ml   Net 119.17 ml        Restraints: No    Significant Dates:  Post Op  "Date: N/A  Rescue Date: N/A  Imaging/ Diagnostics: N/A    Noteworthy Labs:  none    COVID Test: (--)  CBC/Anemia Labs: Coags:    Recent Labs   Lab 01/18/24 0453 01/19/24  0334   WBC 5.92 6.61   HGB 10.1* 9.3*   HCT 34.7* 32.3*    212   MCV 98 98   RDW 14.6* 14.6*    Recent Labs   Lab 01/16/24  0445 01/17/24  0358   APTT 26.6 26.5        Chemistries:   Recent Labs   Lab 01/18/24 0453 01/19/24  0334    141   K 3.3* 3.7   CL 97 99   CO2 32* 34*   BUN 19 27*   CREATININE 0.9 1.0   CALCIUM 9.6 9.8   PROT 7.2 6.6   BILITOT 0.4 0.3   ALKPHOS 74 82   ALT 23 29   AST 27 40   MG 2.0 2.0        Cardiac Enzymes: Ejection Fractions:    No results for input(s): "CPK", "CPKMB", "MB", "TROPONINI" in the last 72 hours. EF   Date Value Ref Range Status   03/20/2023 60 % Final        POCT Glucose: HbA1c:    Recent Labs   Lab 01/17/24  1803 01/19/24  0606 01/19/24  1242   POCTGLUCOSE 140* 171* 178*    Hemoglobin A1C   Date Value Ref Range Status   08/29/2023 5.8 (H) 4.0 - 5.6 % Final     Comment:     ADA Screening Guidelines:  5.7-6.4%  Consistent with prediabetes  >or=6.5%  Consistent with diabetes    High levels of fetal hemoglobin interfere with the HbA1C  assay. Heterozygous hemoglobin variants (HbS, HgC, etc)do  not significantly interfere with this assay.   However, presence of multiple variants may affect accuracy.             ICU LOS 1d 10h  Level of Care: Critical Care    Chart Check: 12 HR Done  Shift Summary/Plan for the shift: Remains in ICU on 15L via trach collar.  Amio d/c'd this shift.  Pt with BM X 1 this shift.  550 mLs of UOP. TF @ goal of 50 mL/hr. FWF per orders; no residual this shift. Accu checks q6h per orders. No insulin required.  Resp cx collected per orders.  IV abx re-started.  Pt anxious requesting medication.  New orders noted and med admin, effective. POC reviewed with pt; expressed understanding.   "

## 2024-01-20 NOTE — PLAN OF CARE
Problem: Fall Injury Risk  Goal: Absence of Fall and Fall-Related Injury  Outcome: Ongoing, Not Progressing     Problem: Infection (Pneumonia)  Goal: Resolution of Infection Signs and Symptoms  Outcome: Ongoing, Not Progressing     Problem: Respiratory Compromise (Pneumonia)  Goal: Effective Oxygenation and Ventilation  Outcome: Ongoing, Not Progressing

## 2024-01-20 NOTE — ASSESSMENT & PLAN NOTE
This patient has hyperkalemia which is uncontrolled. We will monitor for arrhythmias with EKG or continuous telemetry. We will treat the hyperkalemia with Potassium Binders and Nebulized albuterol sulfate. The likely etiology of the hyperkalemia is   The patients latest potassium has been reviewed and the results are listed below  Recent Labs   Lab 01/20/24  0421   K 5.5*     On lokelma for hyperkalemia.

## 2024-01-20 NOTE — PROGRESS NOTES
[facility] Medicine  Progress Note    Patient Name: Fareed Richard Jr.  MRN: [id number]  Patient Class: IP- Inpatient   Admission Date: 1/11/2024  Length of Stay: 9 days  Attending Physician: Santos Marlow, *  Primary Care Provider: Kodi Tubbs MD        Subjective:     Principal Problem:Acute on chronic respiratory failure with hypoxia        HPI:  Mr Fareed Richard Jr. 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 He is on 6L trach collar at home and receives tube feeding. He had shortness of breath, fever, and increased trach output. Denies chest pain.     In ED, he was tachycardic and tachypneic with SpO2 85% on 8L. He was given albuterol, vanc/zosyn, solumedrol. He was admitted to ICU.     Overview/Hospital Course:  Mr Fareed Richard Jr. is a 73 y.o. man who was admitted with acute on chronic hypoxic respiratory failure due to pneumonia, suspect recurrent Pseudomonas. Also with NSTEMI on admit in setting of known CAD. Started vanc, zosyn. Weaned O2. Cardiology consulted- due to severe CAD and complications with last angiogram <1 year ago, will plan medical management with heparin gtt, asa, plavix, and will NOT pursue ischemic evaluation. He is improving. Stepped down to floor. Cultures growing pseudomonas in sputum. On zosyn. Vancomycin stopped. ID consulted,continue with zosyn,on 40% FIO2.  Medical treat ment for NSTEMI per cardiology.  Has Afib with RVR,on floor,was send to ICU on amiodarone gtt,HR is improved.on maintains amio gtt and eliquis,cardiology is following.  Still on 98% FIO2,started on CPT and acetylcysteine nebulizer,consulted pulmonology.  On lokelma for hyperkalemia.    Interval History:Has Afib with RVR,on floor,was send to ICU on amiodarone gtt,HR is improved.    Review of Systems  Objective:     Vital Signs  (Most Recent):  Temp: 98.9 °F (37.2 °C) (01/20/24 0701)  Pulse: 92 (01/20/24 0818)  Resp: (!) 32 (01/20/24 0818)  BP: 129/76 (01/20/24 0801)  SpO2: 100 % (01/20/24 0818) Vital Signs (24h Range):  Temp:  [98.1 °F (36.7 °C)-100.1 °F (37.8 °C)] 98.9 °F (37.2 °C)  Pulse:  [] 92  Resp:  [22-45] 32  SpO2:  [88 %-100 %] 100 %  BP: ()/(53-84) 129/76     Weight: 57.5 kg (126 lb 12.2 oz)  Body mass index is 18.72 kg/m².    Intake/Output Summary (Last 24 hours) at 1/20/2024 0955  Last data filed at 1/20/2024 0701  Gross per 24 hour   Intake 1325.21 ml   Output 800 ml   Net 525.21 ml           Physical Exam  Vitals reviewed.   Constitutional:       General: He is not in acute distress.     Appearance: He is ill-appearing.   HENT:      Head: Normocephalic and atraumatic.      Mouth/Throat:      Mouth: Mucous membranes are dry.   Eyes:      General: No scleral icterus.     Extraocular Movements: Extraocular movements intact.   Cardiovascular:      Rate and Rhythm: Normal rate. Rhythm irregular.   Pulmonary:      Effort: Pulmonary effort is normal. Respiratory distress: course respirations.   Abdominal:      Palpations: Abdomen is soft.      Tenderness: There is no abdominal tenderness.   Musculoskeletal:         General: No swelling or tenderness.      Cervical back: Neck supple. No rigidity.   Skin:     General: Skin is warm and dry.   Neurological:      General: No focal deficit present.      Mental Status: He is alert and oriented to person, place, and time.   Psychiatric:         Mood and Affect: Mood normal.         Behavior: Behavior normal.             Significant Labs: All pertinent labs within the past 24 hours have been reviewed.    Significant Imaging: I have reviewed all pertinent imaging results/findings within the past 24 hours.    Assessment/Plan:      * Acute on chronic respiratory failure with hypoxia  Patient admitted with Hypoxic which is Acute on Chronic.  he is on home oxygen at 6 LPM trach  collar. Signs/symptoms of respiratory failure include- increased work of breathing and respiratory distress present on admission.   - Labs and images were reviewed. Patient Has not has a recent ABG.   - Supplemental oxygen was provided and noted-  trach collar    - Respiratory failure is due to- CHF and Pneumonia   - CT with RML PNA. Trach aspirate suspected Pseudomonas. Continue vanc, zosyn for now- likely can drop vanc tomorrow   - BNP higher than ever before. TTE normal EF. NSTEMI present. Continue lasix 40mg IV daily- can likely change to PO tomorrow  - CTA no PE  - improving from admit  Consulted ID for Abx management. Continuing with Zosyn  On high FIO2 ,continue with HFO and frequent suctioning,pulmonology is on case,    Still on 98% FIO2,started on CPT and acetylcysteine nebulizer,consulted pulmonology.            Atrial fibrillation with RVR  Has Afib with RVR,on floor,was send to ICU on amiodarone gtt,HR is improved..on maintains amio gtt and eliquis,cardiology is following.      Ventilator associated pneumonia  RML infiltrate  Trach aspirate with suspect Pseudomonas  - continue zosyn. ID consulted. Vancomycin stopped        Normocytic anemia  Patient's anemia is currently controlled. Has not received any PRBCs to date. Etiology likely d/t  chronic disease  Current CBC reviewed-   Lab Results   Component Value Date    HGB 9.9 (L) 01/16/2024    HCT 32.4 (L) 01/16/2024     Monitor serial CBC and transfuse if patient becomes hemodynamically unstable, symptomatic or H/H drops below 7/21.    Acute on chronic diastolic heart failure  Patient admitted with shortness of breath. His BNP is higher than ever before despite him appearing euvolemic.      BNP  Recent Labs   Lab 01/11/24  0021   BNP 2,467*       CT RML infiltrate  Recent Labs   Lab 01/11/24  0855   TROPONINI 3.344*       EKG not able to see in MUSE- will need to find. Per Cardiology, no STEMI    Continue 40mg IV lasix QD. Monitor UOP. Likely change to PO  lasix tomorrow  TTE normal EF, potential diastolic dysfunction  Cardiology consulted for NSTEMI- no ischemic eval planned         NSTEMI (non-ST elevated myocardial infarction)  Admitted with NSTEMI. No chest pain.  Recent Labs   Lab 01/11/24  0855   TROPONINI 3.344*       EKG with ischemic changes   He has know CAD  Started asa, plavix, heparin gtt, statin.   TTE EF 50-55%    Medical treat ment for NSTEMI per cardiology.        PEG (percutaneous endoscopic gastrostomy) status  Patient noted to have a percutaneous endoscopic gastrostomy tube in place. I have personally inspected the tube.Tube was placed prior to this admission There are no signs of drainage or infection around the site. The tube is patent. Medications have converted to liquid form if available.  Routine care to be done by wound care and nursing staff.   - resumed tube feeds         Tracheostomy present  Noted      ACP (advance care planning)  Advance Care Planning    Date: 01/11/2024  Full code status          Hyperkalemia  This patient has hyperkalemia which is uncontrolled. We will monitor for arrhythmias with EKG or continuous telemetry. We will treat the hyperkalemia with Potassium Binders and Nebulized albuterol sulfate. The likely etiology of the hyperkalemia is   The patients latest potassium has been reviewed and the results are listed below  Recent Labs   Lab 01/20/24  0421   K 5.5*     On lokelma for hyperkalemia.        Severe protein-calorie malnutrition  Nutrition consulted. Most recent weight and BMI monitored-     Measurements:  Wt Readings from Last 1 Encounters:   01/16/24 58.6 kg (129 lb 3 oz)   Body mass index is 19.08 kg/m².    Patient has been screened and assessed by RD.    Interventions/Recommendations (treatment strategy):     - continue tube feeds    Secondary malignant neoplasm of cervical lymph node     Cancer Staging   Squamous cell cancer of tongue  Staging form: Oral Cavity, AJCC 8th Edition  - Clinical stage from  1/2/2022: Stage NAFISA (cT2, cN2a, cM0) - Signed by Naeem Smalls MD on 1/2/2022  - Pathologic stage from 1/4/2022: Stage IVB (pT4a, pN3b, cM0) - Signed by Christopher Jay MD on 2/16/2022        COPD exacerbation   On nebulizer     Other hyperlipidemia  Continue statin      Primary hypertension  Chronic, controlled. Latest blood pressure and vitals reviewed-     Temp:  [97.7 °F (36.5 °C)-98.4 °F (36.9 °C)]   Pulse:  []   Resp:  [18-38]   BP: ()/(57-83)   SpO2:  [90 %-100 %] .   Home meds for hypertension were reviewed and noted below.   Hypertension Medications               furosemide (LASIX) 40 MG tablet Take 0.5 tablets (20 mg total) by mouth once daily.    metoprolol tartrate (LOPRESSOR) 25 MG tablet Take 0.5 tablets (12.5 mg total) by mouth 2 (two) times daily.          - hold BP Rx because BP is borderline low     Will utilize p.r.n. blood pressure medication only if patient's blood pressure greater than 180/110 and he develops symptoms such as worsening chest pain or shortness of breath.    Coronary artery disease involving native coronary artery of native heart without angina pectoris  Patient with known CAD s/p stent placement, which is uncontrolled Will continue ASA, Plavix, and Statin and monitor for S/Sx of angina/ACS. Continue to monitor on telemetry.   - see NSTEMI    Peripheral vascular disease  Known PAD from last angiogram 3/2023  Continue asa, plavix, statin         VTE Risk Mitigation (From admission, onward)           Ordered     apixaban tablet 2.5 mg  2 times daily         01/18/24 0815     heparin 25,000 units in dextrose 5% 250 mL (100 units/mL) infusion LOW INTENSITY nomogram - [facility]  Continuous        Question:  Begin at (units/kg/hr)  Answer:  12    01/13/24 0819     IP VTE LOW RISK PATIENT  Once         01/11/24 1648     Place sequential compression device  Until discontinued         01/11/24 0225                    Discharge Planning   NISA: 1/19/2024     Code Status: Full  Code   Is the patient medically ready for discharge?:     Reason for patient still in hospital (select all that apply): Patient trending condition  Discharge Plan A: Home Health   Discharge Delays: None known at this time        Critical care time spent on the evaluation and treatment of severe organ dysfunction, review of pertinent labs and imaging studies, discussions with consulting providers and discussions with patient/family: over 45  minutes.      Santos Marlow MD  Department of Hospital Medicine   [facility] [place] - Intensive Care

## 2024-01-20 NOTE — ASSESSMENT & PLAN NOTE
Worsening hypoxia throughout the day  Concern for worsening infection again- restart zosyn, repeat sputum culture, adding inhaled tobramycin for chronic pseudomonas infection  Continue bipap support continuously for now.   Bipap can be without a cuff- no need for change of his trach for bipap

## 2024-01-20 NOTE — CARE UPDATE
[facility], [facility]  Nurses Note -- 4 Eyes      1/19/2024       Skin assessed on: Q Shift      [x] No Pressure Injuries Present    [x]Prevention Measures Documented    [] Yes LDA  for Pressure Injury Previously documented     [] Yes New Pressure Injury Discovered   [] LDA for New Pressure Injury Added      Attending RN:  Elin Medina RN     Second RN:  KAMILAH Chadwick

## 2024-01-20 NOTE — SUBJECTIVE & OBJECTIVE
Interval History: Mr. Richard is less responsive today and was on bipap overnight with continued worsening secretions.       Objective:     Vital Signs (Most Recent):  Temp: 98.9 °F (37.2 °C) (01/20/24 0701)  Pulse: 81 (01/20/24 1000)  Resp: (!) 31 (01/20/24 1000)  BP: 111/62 (01/20/24 1000)  SpO2: 100 % (01/20/24 1000) Vital Signs (24h Range):  Temp:  [98.7 °F (37.1 °C)-100.1 °F (37.8 °C)] 98.9 °F (37.2 °C)  Pulse:  [] 81  Resp:  [22-45] 31  SpO2:  [88 %-100 %] 100 %  BP: ()/(53-84) 111/62     Weight: 57.5 kg (126 lb 12.2 oz)  Body mass index is 18.72 kg/m².      Intake/Output Summary (Last 24 hours) at 1/20/2024 1201  Last data filed at 1/20/2024 0701  Gross per 24 hour   Intake 1155.24 ml   Output 600 ml   Net 555.24 ml        Physical Exam  Constitutional:       Appearance: He is normal weight. He is ill-appearing.   HENT:      Head: Normocephalic and atraumatic.      Nose: Nose normal.      Mouth/Throat:      Mouth: Mucous membranes are moist.   Eyes:      Conjunctiva/sclera: Conjunctivae normal.      Pupils: Pupils are equal, round, and reactive to light.   Neck:      Comments: Trach in place with thick dark secretions   Cardiovascular:      Rate and Rhythm: Normal rate and regular rhythm.      Pulses: Normal pulses.   Pulmonary:      Effort: Tachypnea and accessory muscle usage present. No respiratory distress.      Breath sounds: Rhonchi present.   Abdominal:      General: Abdomen is flat. Bowel sounds are normal.   Musculoskeletal:         General: No swelling or deformity.   Skin:     General: Skin is warm.      Capillary Refill: Capillary refill takes less than 2 seconds.   Neurological:      General: No focal deficit present.      Mental Status: He is alert and oriented to person, place, and time.   Psychiatric:      Comments: Anxious             Review of Systems    Vents:  Oxygen Concentration (%): 50 (01/20/24 0818)    Lines/Drains/Airways       Drain  Duration                   Gastrostomy/Enterostomy 01/04/22 Percutaneous endoscopic gastrostomy (PEG)  days    Male External Urinary Catheter 01/13/24 1800 6 days              Airway  Duration             Adult Surgical Airway 03/16/23 1014 Shiley Cuffed 6.0 310 days              Peripheral Intravenous Line  Duration                  Peripheral IV - Single Lumen 01/11/24 0013 20 G Right Antecubital 9 days         Peripheral IV - Single Lumen 01/15/24 1810 Anterior;Left;Proximal Forearm 4 days                    Significant Labs:    CBC/Anemia Profile:  Recent Labs   Lab 01/19/24  0334 01/20/24  0421   WBC 6.61 7.30   HGB 9.3* 10.5*   HCT 32.3* 37.6*    236   MCV 98 103*   RDW 14.6* 14.6*        Chemistries:  Recent Labs   Lab 01/19/24 0334 01/20/24  0421    146*   K 3.7 5.5*   CL 99 99   CO2 34* 40*   BUN 27* 29*   CREATININE 1.0 1.1   CALCIUM 9.8 10.1   ALBUMIN 2.5* 2.6*   PROT 6.6 7.3   BILITOT 0.3 0.3   ALKPHOS 82 81   ALT 29 33   AST 40 42*   MG 2.0 1.9       All pertinent labs within the past 24 hours have been reviewed.    Significant Imaging:  I have reviewed all pertinent imaging results/findings within the past 24 hours.

## 2024-01-20 NOTE — ASSESSMENT & PLAN NOTE
Not concerned for acute COPD exacerbation- more likely pneumonia and now requiring bipap support.

## 2024-01-20 NOTE — PLAN OF CARE
Remains in ICU on 15L via trach collar.  Amio d/c'd this shift.  Pt with BM X 1 this shift.  550 mLs of UOP. TF @ goal of 50 mL/hr. FWF per orders; no residual this shift. Accu checks q6h per orders. No insulin required.  Resp cx collected per orders.  IV abx re-started.  Pt anxious requesting medication.  New orders noted and med admin, effective. POC reviewed with pt; expressed understanding.     Problem: Adult Inpatient Plan of Care  Goal: Plan of Care Review  Outcome: Ongoing, Progressing  Goal: Patient-Specific Goal (Individualized)  Outcome: Ongoing, Progressing  Goal: Absence of Hospital-Acquired Illness or Injury  Outcome: Ongoing, Progressing  Goal: Optimal Comfort and Wellbeing  Outcome: Ongoing, Progressing  Goal: Readiness for Transition of Care  Outcome: Ongoing, Progressing     Problem: Skin Injury Risk Increased  Goal: Skin Health and Integrity  Outcome: Ongoing, Progressing     Problem: Fall Injury Risk  Goal: Absence of Fall and Fall-Related Injury  Outcome: Ongoing, Progressing     Problem: Fluid Imbalance (Pneumonia)  Goal: Fluid Balance  Outcome: Ongoing, Progressing     Problem: Infection (Pneumonia)  Goal: Resolution of Infection Signs and Symptoms  Outcome: Ongoing, Progressing     Problem: Respiratory Compromise (Pneumonia)  Goal: Effective Oxygenation and Ventilation  Outcome: Ongoing, Progressing     Problem: Adjustment to Illness (Acute Coronary Syndrome)  Goal: Optimal Adaptation to Illness  Outcome: Ongoing, Progressing     Problem: Dysrhythmia (Acute Coronary Syndrome)  Goal: Normalized Cardiac Rhythm  Outcome: Ongoing, Progressing     Problem: Cardiac-Related Pain (Acute Coronary Syndrome)  Goal: Absence of Cardiac-Related Pain  Outcome: Ongoing, Progressing     Problem: Hemodynamic Instability (Acute Coronary Syndrome)  Goal: Effective Cardiac Pump Function  Outcome: Ongoing, Progressing     Problem: Tissue Perfusion (Acute Coronary Syndrome)  Goal: Adequate Tissue Perfusion  Outcome:  Ongoing, Progressing     Problem: Arrhythmia/Dysrhythmia (Cardiac Catheterization)  Goal: Stable Heart Rate and Rhythm  Outcome: Ongoing, Progressing     Problem: Bleeding (Cardiac Catheterization)  Goal: Absence of Bleeding  Outcome: Ongoing, Progressing     Problem: Contrast-Induced Injury Risk (Cardiac Catheterization)  Goal: Absence of Contrast-Induced Injury  Outcome: Ongoing, Progressing     Problem: Embolism (Cardiac Catheterization)  Goal: Absence of Embolism Signs and Symptoms  Outcome: Ongoing, Progressing     Problem: Ongoing Anesthesia/Sedation Effects (Cardiac Catheterization)  Goal: Anesthesia/Sedation Recovery  Outcome: Ongoing, Progressing     Problem: Pain (Cardiac Catheterization)  Goal: Acceptable Pain Control  Outcome: Ongoing, Progressing     Problem: Vascular Access Protection (Cardiac Catheterization)  Goal: Absence of Vascular Access Complication  Outcome: Ongoing, Progressing

## 2024-01-20 NOTE — SUBJECTIVE & OBJECTIVE
Interval History:Has Afib with RVR,on floor,was send to ICU on amiodarone gtt,HR is improved.    Review of Systems  Objective:     Vital Signs (Most Recent):  Temp: 98.9 °F (37.2 °C) (01/20/24 0701)  Pulse: 92 (01/20/24 0818)  Resp: (!) 32 (01/20/24 0818)  BP: 129/76 (01/20/24 0801)  SpO2: 100 % (01/20/24 0818) Vital Signs (24h Range):  Temp:  [98.1 °F (36.7 °C)-100.1 °F (37.8 °C)] 98.9 °F (37.2 °C)  Pulse:  [] 92  Resp:  [22-45] 32  SpO2:  [88 %-100 %] 100 %  BP: ()/(53-84) 129/76     Weight: 57.5 kg (126 lb 12.2 oz)  Body mass index is 18.72 kg/m².    Intake/Output Summary (Last 24 hours) at 1/20/2024 0955  Last data filed at 1/20/2024 0701  Gross per 24 hour   Intake 1325.21 ml   Output 800 ml   Net 525.21 ml           Physical Exam  Vitals reviewed.   Constitutional:       General: He is not in acute distress.     Appearance: He is ill-appearing.   HENT:      Head: Normocephalic and atraumatic.      Mouth/Throat:      Mouth: Mucous membranes are dry.   Eyes:      General: No scleral icterus.     Extraocular Movements: Extraocular movements intact.   Cardiovascular:      Rate and Rhythm: Normal rate. Rhythm irregular.   Pulmonary:      Effort: Pulmonary effort is normal. Respiratory distress: course respirations.   Abdominal:      Palpations: Abdomen is soft.      Tenderness: There is no abdominal tenderness.   Musculoskeletal:         General: No swelling or tenderness.      Cervical back: Neck supple. No rigidity.   Skin:     General: Skin is warm and dry.   Neurological:      General: No focal deficit present.      Mental Status: He is alert and oriented to person, place, and time.   Psychiatric:         Mood and Affect: Mood normal.         Behavior: Behavior normal.             Significant Labs: All pertinent labs within the past 24 hours have been reviewed.    Significant Imaging: I have reviewed all pertinent imaging results/findings within the past 24 hours.

## 2024-01-21 PROBLEM — Z51.5 COMFORT MEASURES ONLY STATUS: Status: ACTIVE | Noted: 2024-01-21

## 2024-01-21 LAB
ALBUMIN SERPL BCP-MCNC: 2.2 G/DL (ref 3.5–5.2)
ALP SERPL-CCNC: 84 U/L (ref 55–135)
ALT SERPL W/O P-5'-P-CCNC: 32 U/L (ref 10–44)
ANION GAP SERPL CALC-SCNC: 8 MMOL/L (ref 8–16)
AST SERPL-CCNC: 38 U/L (ref 10–40)
BACTERIA SPEC AEROBE CULT: ABNORMAL
BASOPHILS # BLD AUTO: 0.01 K/UL (ref 0–0.2)
BASOPHILS NFR BLD: 0.1 % (ref 0–1.9)
BILIRUB SERPL-MCNC: 0.3 MG/DL (ref 0.1–1)
BUN SERPL-MCNC: 37 MG/DL (ref 8–23)
CALCIUM SERPL-MCNC: 9.4 MG/DL (ref 8.7–10.5)
CHLORIDE SERPL-SCNC: 97 MMOL/L (ref 95–110)
CO2 SERPL-SCNC: 37 MMOL/L (ref 23–29)
CREAT SERPL-MCNC: 1.1 MG/DL (ref 0.5–1.4)
DIFFERENTIAL METHOD BLD: ABNORMAL
EOSINOPHIL # BLD AUTO: 0 K/UL (ref 0–0.5)
EOSINOPHIL NFR BLD: 0.3 % (ref 0–8)
ERYTHROCYTE [DISTWIDTH] IN BLOOD BY AUTOMATED COUNT: 14.7 % (ref 11.5–14.5)
EST. GFR  (NO RACE VARIABLE): >60 ML/MIN/1.73 M^2
GLUCOSE SERPL-MCNC: 185 MG/DL (ref 70–110)
GRAM STN SPEC: ABNORMAL
HCT VFR BLD AUTO: 32.5 % (ref 40–54)
HGB BLD-MCNC: 9.1 G/DL (ref 14–18)
IMM GRANULOCYTES # BLD AUTO: 0.06 K/UL (ref 0–0.04)
IMM GRANULOCYTES NFR BLD AUTO: 0.8 % (ref 0–0.5)
LYMPHOCYTES # BLD AUTO: 0.3 K/UL (ref 1–4.8)
LYMPHOCYTES NFR BLD: 4.7 % (ref 18–48)
MAGNESIUM SERPL-MCNC: 1.9 MG/DL (ref 1.6–2.6)
MCH RBC QN AUTO: 28.6 PG (ref 27–31)
MCHC RBC AUTO-ENTMCNC: 28 G/DL (ref 32–36)
MCV RBC AUTO: 102 FL (ref 82–98)
MONOCYTES # BLD AUTO: 0.5 K/UL (ref 0.3–1)
MONOCYTES NFR BLD: 6.9 % (ref 4–15)
NEUTROPHILS # BLD AUTO: 6.2 K/UL (ref 1.8–7.7)
NEUTROPHILS NFR BLD: 87.2 % (ref 38–73)
NRBC BLD-RTO: 0 /100 WBC
PLATELET # BLD AUTO: 193 K/UL (ref 150–450)
PMV BLD AUTO: 9.8 FL (ref 9.2–12.9)
POTASSIUM SERPL-SCNC: 3.4 MMOL/L (ref 3.5–5.1)
PROT SERPL-MCNC: 6.4 G/DL (ref 6–8.4)
RBC # BLD AUTO: 3.18 M/UL (ref 4.6–6.2)
SODIUM SERPL-SCNC: 142 MMOL/L (ref 136–145)
WBC # BLD AUTO: 7.06 K/UL (ref 3.9–12.7)

## 2024-01-21 PROCEDURE — 85025 COMPLETE CBC W/AUTO DIFF WBC: CPT | Performed by: HOSPITALIST

## 2024-01-21 PROCEDURE — 25000242 PHARM REV CODE 250 ALT 637 W/ HCPCS: Performed by: INTERNAL MEDICINE

## 2024-01-21 PROCEDURE — 27100171 HC OXYGEN HIGH FLOW UP TO 24 HOURS

## 2024-01-21 PROCEDURE — 99291 CRITICAL CARE FIRST HOUR: CPT | Mod: ,,, | Performed by: INTERNAL MEDICINE

## 2024-01-21 PROCEDURE — 94799 UNLISTED PULMONARY SVC/PX: CPT

## 2024-01-21 PROCEDURE — 99900026 HC AIRWAY MAINTENANCE (STAT)

## 2024-01-21 PROCEDURE — 83735 ASSAY OF MAGNESIUM: CPT | Performed by: HOSPITALIST

## 2024-01-21 PROCEDURE — 80053 COMPREHEN METABOLIC PANEL: CPT | Performed by: HOSPITALIST

## 2024-01-21 PROCEDURE — 20000000 HC ICU ROOM

## 2024-01-21 PROCEDURE — 99900035 HC TECH TIME PER 15 MIN (STAT)

## 2024-01-21 PROCEDURE — 94640 AIRWAY INHALATION TREATMENT: CPT

## 2024-01-21 PROCEDURE — 25000242 PHARM REV CODE 250 ALT 637 W/ HCPCS: Performed by: HOSPITALIST

## 2024-01-21 PROCEDURE — 25000003 PHARM REV CODE 250: Performed by: INTERNAL MEDICINE

## 2024-01-21 PROCEDURE — 94660 CPAP INITIATION&MGMT: CPT

## 2024-01-21 PROCEDURE — 63600175 PHARM REV CODE 636 W HCPCS: Mod: JZ,JG | Performed by: INTERNAL MEDICINE

## 2024-01-21 PROCEDURE — 5A1945Z RESPIRATORY VENTILATION, 24-96 CONSECUTIVE HOURS: ICD-10-PCS | Performed by: INTERNAL MEDICINE

## 2024-01-21 PROCEDURE — 25000003 PHARM REV CODE 250: Performed by: STUDENT IN AN ORGANIZED HEALTH CARE EDUCATION/TRAINING PROGRAM

## 2024-01-21 PROCEDURE — 25000003 PHARM REV CODE 250: Performed by: HOSPITALIST

## 2024-01-21 PROCEDURE — 36415 COLL VENOUS BLD VENIPUNCTURE: CPT | Performed by: HOSPITALIST

## 2024-01-21 PROCEDURE — 94761 N-INVAS EAR/PLS OXIMETRY MLT: CPT

## 2024-01-21 RX ORDER — MORPHINE SULFATE 4 MG/ML
2 INJECTION, SOLUTION INTRAMUSCULAR; INTRAVENOUS
Status: DISCONTINUED | OUTPATIENT
Start: 2024-01-21 | End: 2024-01-25 | Stop reason: HOSPADM

## 2024-01-21 RX ADMIN — ALPRAZOLAM 0.5 MG: 0.5 TABLET ORAL at 11:01

## 2024-01-21 RX ADMIN — LEVALBUTEROL 1.25 MG: 1.25 SOLUTION, CONCENTRATE RESPIRATORY (INHALATION) at 08:01

## 2024-01-21 RX ADMIN — ACETYLCYSTEINE 4 ML: 100 INHALANT RESPIRATORY (INHALATION) at 08:01

## 2024-01-21 RX ADMIN — MORPHINE SULFATE 2 MG: 4 INJECTION, SOLUTION INTRAMUSCULAR; INTRAVENOUS at 07:01

## 2024-01-21 RX ADMIN — PIPERACILLIN AND TAZOBACTAM 4.5 G: 4; .5 INJECTION, POWDER, LYOPHILIZED, FOR SOLUTION INTRAVENOUS; PARENTERAL at 01:01

## 2024-01-21 RX ADMIN — MORPHINE SULFATE 2 MG: 4 INJECTION, SOLUTION INTRAMUSCULAR; INTRAVENOUS at 11:01

## 2024-01-21 RX ADMIN — ACETAMINOPHEN 650 MG: 325 TABLET ORAL at 01:01

## 2024-01-21 RX ADMIN — SODIUM CHLORIDE: 4.5 INJECTION, SOLUTION INTRAVENOUS at 01:01

## 2024-01-21 RX ADMIN — TOBRAMYCIN 300 MG: 300 SOLUTION RESPIRATORY (INHALATION) at 08:01

## 2024-01-21 RX ADMIN — LEVALBUTEROL 1.25 MG: 1.25 SOLUTION, CONCENTRATE RESPIRATORY (INHALATION) at 07:01

## 2024-01-21 RX ADMIN — HYDROXYZINE HYDROCHLORIDE 25 MG: 25 TABLET ORAL at 01:01

## 2024-01-21 RX ADMIN — MELATONIN TAB 3 MG 6 MG: 3 TAB at 01:01

## 2024-01-21 RX ADMIN — ACETYLCYSTEINE 4 ML: 100 INHALANT RESPIRATORY (INHALATION) at 07:01

## 2024-01-21 NOTE — ASSESSMENT & PLAN NOTE
Patient's anemia is currently controlled. Has not received any PRBCs to date. Etiology likely d/t  chronic disease  Current CBC reviewed-   Lab Results   Component Value Date    HGB 9.1 (L) 01/21/2024    HCT 32.5 (L) 01/21/2024     Monitor serial CBC and transfuse if patient becomes hemodynamically unstable, symptomatic or H/H drops below 7/21.

## 2024-01-21 NOTE — ASSESSMENT & PLAN NOTE
"Admitted with NSTEMI. No chest pain.  No results for input(s): "TROPONINI", "TROPONINIHS" in the last 168 hours.    EKG with ischemic changes   He has know CAD  Started asa, plavix, heparin gtt, statin.   TTE EF 50-55%    Medical treat ment for NSTEMI per cardiology.      "

## 2024-01-21 NOTE — NURSING
Patient desaturating in bipap and not pulling TV. Suctioned bloody secretions with blood clots. RT at at bedside.

## 2024-01-21 NOTE — ASSESSMENT & PLAN NOTE
Mr. Richard continued to worsen overnight and had bloody secretions with increased oxygen requirements. His wife and daughter are at his bedside and they both agree that he has been through so much and is tired. They would like to focus on his comfort at this time. We have discontinued all medications that do not assist with his anxiety or comfort. Have continued his antibiotics for now because they do help his secretions. Will plan for more conversations pending his clinical status over the next 24 hours.

## 2024-01-21 NOTE — ASSESSMENT & PLAN NOTE
Worsening hypoxia throughout the day  Concern for worsening infection again- restart zosyn, repeat sputum culture, chronic pseudomonas infection  Continue bipap support continuously for now.   Bipap can be without a cuff- no need for change of his trach for bipap

## 2024-01-21 NOTE — ASSESSMENT & PLAN NOTE
Patient admitted with Hypoxic which is Acute on Chronic.  he is on home oxygen at 6 LPM trach collar. Signs/symptoms of respiratory failure include- increased work of breathing and respiratory distress present on admission.   - Labs and images were reviewed. Patient Has not has a recent ABG.   - Supplemental oxygen was provided and noted-  trach collar    - Respiratory failure is due to- CHF and Pneumonia   - CT with RML PNA. Trach aspirate suspected Pseudomonas. Continue vanc, zosyn for now- likely can drop vanc tomorrow   - BNP higher than ever before. TTE normal EF. NSTEMI present. Continue lasix 40mg IV daily- can likely change to PO tomorrow  - CTA no PE  - improving from admit  Consulted ID for Abx management. Continuing with Zosyn  On high FIO2 ,continue with HFO and frequent suctioning,pulmonology is on case,    Still on 98% FIO2,started on CPT and acetylcysteine nebulizer,consulted pulmonology.  -transitioning to comfort measures as respiratory function declined.

## 2024-01-21 NOTE — PLAN OF CARE
Problem: Skin Injury Risk Increased  Goal: Skin Health and Integrity  Outcome: Ongoing, Not Progressing     Problem: Fluid Imbalance (Pneumonia)  Goal: Fluid Balance  Outcome: Ongoing, Not Progressing     Problem: Infection (Pneumonia)  Goal: Resolution of Infection Signs and Symptoms  Outcome: Ongoing, Not Progressing

## 2024-01-21 NOTE — ASSESSMENT & PLAN NOTE
Chronic, controlled. Latest blood pressure and vitals reviewed-     Temp:  [97.1 °F (36.2 °C)-98.7 °F (37.1 °C)]   Pulse:  []   Resp:  [18-49]   BP: ()/(45-72)   SpO2:  [76 %-100 %] .   Home meds for hypertension were reviewed and noted below.   Hypertension Medications               furosemide (LASIX) 40 MG tablet Take 0.5 tablets (20 mg total) by mouth once daily.    metoprolol tartrate (LOPRESSOR) 25 MG tablet Take 0.5 tablets (12.5 mg total) by mouth 2 (two) times daily.          - hold BP Rx because BP is borderline low     Will utilize p.r.n. blood pressure medication only if patient's blood pressure greater than 180/110 and he develops symptoms such as worsening chest pain or shortness of breath.

## 2024-01-21 NOTE — SUBJECTIVE & OBJECTIVE
Interval History:  Patient with family at bedside appears comfortable at this time with some mild movement of legs family asking for something for anxiety.  P.r.n. is ordered.    Review of Systems  Objective:     Vital Signs (Most Recent):  Temp: 98.7 °F (37.1 °C) (01/21/24 0701)  Pulse: 91 (01/21/24 1000)  Resp: (!) 49 (01/21/24 1150)  BP: 105/63 (01/21/24 1000)  SpO2: 98 % (01/21/24 1000) Vital Signs (24h Range):  Temp:  [97.1 °F (36.2 °C)-98.7 °F (37.1 °C)] 98.7 °F (37.1 °C)  Pulse:  [] 91  Resp:  [18-49] 49  SpO2:  [76 %-100 %] 98 %  BP: ()/(45-72) 105/63     Weight: 57.5 kg (126 lb 12.2 oz)  Body mass index is 18.72 kg/m².    Intake/Output Summary (Last 24 hours) at 1/21/2024 1218  Last data filed at 1/21/2024 1000  Gross per 24 hour   Intake 4692.39 ml   Output 1100 ml   Net 3592.39 ml         Physical Exam      Vitals reviewed.   Constitutional:       General: He is not in acute distress.     Appearance: He is ill-appearing.   HENT:      Head: Normocephalic and atraumatic.      Mouth/Throat:      Mouth: Mucous membranes are dry.   Eyes:      General: No scleral icterus.     Extraocular Movements: Extraocular movements intact.   Cardiovascular:      Rate and Rhythm: Normal rate. Rhythm irregular.   Pulmonary:      Effort: Pulmonary effort is normal. Respiratory distress: course respirations.   Musculoskeletal:         General: No swelling or tenderness.      Cervical back: Neck supple. No rigidity.   Skin:     General: Skin is warm and dry.   Significant Labs: All pertinent labs within the past 24 hours have been reviewed.    Significant Imaging: I have reviewed all pertinent imaging results/findings within the past 24 hours.

## 2024-01-21 NOTE — ASSESSMENT & PLAN NOTE
Nutrition consulted. Most recent weight and BMI monitored-     Measurements:  Wt Readings from Last 1 Encounters:   01/17/24 57.5 kg (126 lb 12.2 oz)   Body mass index is 18.72 kg/m².    Patient has been screened and assessed by RD.    Interventions/Recommendations (treatment strategy):     - continue tube feeds

## 2024-01-21 NOTE — ASSESSMENT & PLAN NOTE
This patient has hyperkalemia which is uncontrolled. We will monitor for arrhythmias with EKG or continuous telemetry. We will treat the hyperkalemia with Potassium Binders and Nebulized albuterol sulfate. The likely etiology of the hyperkalemia is   The patients latest potassium has been reviewed and the results are listed below  Recent Labs   Lab 01/21/24  0419   K 3.4*     resolved

## 2024-01-21 NOTE — ASSESSMENT & PLAN NOTE
Patient admitted with shortness of breath. His BNP is higher than ever before despite him appearing euvolemic.        EKG not able to see in MUSE- will need to find. Per Cardiology, no STEMI    Continue 40mg IV lasix QD. Monitor UOP. Likely change to PO lasix tomorrow  TTE normal EF, potential diastolic dysfunction  Cardiology consulted for NSTEMI- no ischemic eval planned

## 2024-01-21 NOTE — SUBJECTIVE & OBJECTIVE
Interval History: Mr. Richard continued to worsen throughout the night and his family was called to come in this morning.       Objective:     Vital Signs (Most Recent):  Temp: 98.7 °F (37.1 °C) (01/21/24 0701)  Pulse: 91 (01/21/24 1000)  Resp: (!) 23 (01/21/24 1000)  BP: 105/63 (01/21/24 1000)  SpO2: 98 % (01/21/24 1000) Vital Signs (24h Range):  Temp:  [97.1 °F (36.2 °C)-98.7 °F (37.1 °C)] 98.7 °F (37.1 °C)  Pulse:  [] 91  Resp:  [18-49] 23  SpO2:  [76 %-100 %] 98 %  BP: ()/(45-72) 105/63     Weight: 57.5 kg (126 lb 12.2 oz)  Body mass index is 18.72 kg/m².      Intake/Output Summary (Last 24 hours) at 1/21/2024 1104  Last data filed at 1/21/2024 1000  Gross per 24 hour   Intake 4692.39 ml   Output 1100 ml   Net 3592.39 ml        Physical Exam  Constitutional:       Appearance: He is normal weight. He is ill-appearing.   HENT:      Head: Normocephalic and atraumatic.      Nose: Nose normal.      Mouth/Throat:      Mouth: Mucous membranes are moist.   Eyes:      Conjunctiva/sclera: Conjunctivae normal.      Pupils: Pupils are equal, round, and reactive to light.   Neck:      Comments: Trach in place with thick dark secretions   Cardiovascular:      Rate and Rhythm: Normal rate and regular rhythm.      Pulses: Normal pulses.   Pulmonary:      Effort: Tachypnea and accessory muscle usage present. No respiratory distress.      Breath sounds: Rhonchi present.   Abdominal:      General: Abdomen is flat. Bowel sounds are normal.   Musculoskeletal:         General: No swelling or deformity.   Skin:     General: Skin is warm.      Capillary Refill: Capillary refill takes less than 2 seconds.   Neurological:      General: No focal deficit present.      Mental Status: He is alert and oriented to person, place, and time.   Psychiatric:      Comments: Anxious             Review of Systems    Vents:  Oxygen Concentration (%): 80 (01/21/24 0744)    Lines/Drains/Airways       Drain  Duration                   Gastrostomy/Enterostomy 01/04/22 Percutaneous endoscopic gastrostomy (PEG)  days    Male External Urinary Catheter 01/13/24 1800 7 days              Airway  Duration             Adult Surgical Airway 03/16/23 1014 Shiley Cuffed 6.0 311 days              Peripheral Intravenous Line  Duration                  Peripheral IV - Single Lumen 01/11/24 0013 20 G Right Antecubital 10 days         Peripheral IV - Single Lumen 01/15/24 1810 Anterior;Left;Proximal Forearm 5 days                    Significant Labs:    CBC/Anemia Profile:  Recent Labs   Lab 01/20/24  0421 01/21/24  0419   WBC 7.30 7.06   HGB 10.5* 9.1*   HCT 37.6* 32.5*    193   * 102*   RDW 14.6* 14.7*        Chemistries:  Recent Labs   Lab 01/20/24  0421 01/21/24  0419   * 142   K 5.5* 3.4*   CL 99 97   CO2 40* 37*   BUN 29* 37*   CREATININE 1.1 1.1   CALCIUM 10.1 9.4   ALBUMIN 2.6* 2.2*   PROT 7.3 6.4   BILITOT 0.3 0.3   ALKPHOS 81 84   ALT 33 32   AST 42* 38   MG 1.9 1.9       All pertinent labs within the past 24 hours have been reviewed.    Significant Imaging:  I have reviewed all pertinent imaging results/findings within the past 24 hours.

## 2024-01-21 NOTE — ASSESSMENT & PLAN NOTE
Advance Care Planning     Date: 01/11/2024  Patient transitioned to DNR initially and now on comfort focused care after discussions with pulmonology.

## 2024-01-21 NOTE — PROGRESS NOTES
[facility] Care  [facility] Medicine  Progress Note    Patient Name: Fareed Richard Jr.  MRN: [id number]  Patient Class: IP- Inpatient   Admission Date: 1/11/2024  Length of Stay: 10 days  Attending Physician: Nikita Castillo III, MD  Primary Care Provider: Kodi Tubbs MD        Subjective:     Principal Problem:Acute on chronic respiratory failure with hypoxia        HPI:  Mr Fareed Richard Jr. 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 He is on 6L trach collar at home and receives tube feeding. He had shortness of breath, fever, and increased trach output. Denies chest pain.     In ED, he was tachycardic and tachypneic with SpO2 85% on 8L. He was given albuterol, vanc/zosyn, solumedrol. He was admitted to ICU.     Overview/Hospital Course:  Mr Fareed Richard Jr. is a 73 y.o. man who was admitted with acute on chronic hypoxic respiratory failure due to pneumonia, suspect recurrent Pseudomonas. Also with NSTEMI on admit in setting of known CAD. Started vanc, zosyn. Weaned O2. Cardiology consulted- due to severe CAD and complications with last angiogram <1 year ago, will plan medical management with heparin gtt, asa, plavix, and will NOT pursue ischemic evaluation. He is improving. Stepped down to floor. Cultures growing pseudomonas in sputum. On zosyn. Vancomycin stopped. ID consulted,continue with zosyn,on 40% FIO2.  Medical treat ment for NSTEMI per cardiology.  Has Afib with RVR,on floor,was send to ICU on amiodarone gtt,HR is improved.on maintains amio gtt and eliquis,cardiology is following.  Still on 98% FIO2,started on CPT and acetylcysteine nebulizer,consulted pulmonology.  On lokelma for hyperkalemia.    Interval History:  Patient with family at bedside appears comfortable at this time with some mild movement of legs family asking for something for  anxiety.  P.r.n. is ordered.    Review of Systems  Objective:     Vital Signs (Most Recent):  Temp: 98.7 °F (37.1 °C) (01/21/24 0701)  Pulse: 91 (01/21/24 1000)  Resp: (!) 49 (01/21/24 1150)  BP: 105/63 (01/21/24 1000)  SpO2: 98 % (01/21/24 1000) Vital Signs (24h Range):  Temp:  [97.1 °F (36.2 °C)-98.7 °F (37.1 °C)] 98.7 °F (37.1 °C)  Pulse:  [] 91  Resp:  [18-49] 49  SpO2:  [76 %-100 %] 98 %  BP: ()/(45-72) 105/63     Weight: 57.5 kg (126 lb 12.2 oz)  Body mass index is 18.72 kg/m².    Intake/Output Summary (Last 24 hours) at 1/21/2024 1218  Last data filed at 1/21/2024 1000  Gross per 24 hour   Intake 4692.39 ml   Output 1100 ml   Net 3592.39 ml         Physical Exam      Vitals reviewed.   Constitutional:       General: He is not in acute distress.     Appearance: He is ill-appearing.   HENT:      Head: Normocephalic and atraumatic.      Mouth/Throat:      Mouth: Mucous membranes are dry.   Eyes:      General: No scleral icterus.     Extraocular Movements: Extraocular movements intact.   Cardiovascular:      Rate and Rhythm: Normal rate. Rhythm irregular.   Pulmonary:      Effort: Pulmonary effort is normal. Respiratory distress: course respirations.   Musculoskeletal:         General: No swelling or tenderness.      Cervical back: Neck supple. No rigidity.   Skin:     General: Skin is warm and dry.   Significant Labs: All pertinent labs within the past 24 hours have been reviewed.    Significant Imaging: I have reviewed all pertinent imaging results/findings within the past 24 hours.    Assessment/Plan:      * Acute on chronic respiratory failure with hypoxia  Patient admitted with Hypoxic which is Acute on Chronic.  he is on home oxygen at 6 LPM trach [facility]. Signs/symptoms of respiratory failure include- increased work of breathing and respiratory distress present on admission.   - Labs and images were reviewed. Patient Has not has a recent ABG.   - Supplemental oxygen was provided and noted-   "trach collar    - Respiratory failure is due to- CHF and Pneumonia   - CT with RML PNA. Trach aspirate suspected Pseudomonas. Continue vanc, zosyn for now- likely can drop vanc tomorrow   - BNP higher than ever before. TTE normal EF. NSTEMI present. Continue lasix 40mg IV daily- can likely change to PO tomorrow  - CTA no PE  - improving from admit  Consulted ID for Abx management. Continuing with Zosyn  On high FIO2 ,continue with HFO and frequent suctioning,pulmonology is on case,    Still on 98% FIO2,started on CPT and acetylcysteine nebulizer,consulted pulmonology.  -transitioning to comfort measures as respiratory function declined.        Comfort measures only status        Atrial fibrillation with RVR  Has Afib with RVR,on floor,was send to ICU on amiodarone gtt,HR is improved..on maintains amio gtt and eliquis,cardiology is following.      Ventilator associated pneumonia  RML infiltrate  Trach aspirate with suspect Pseudomonas  - continue zosyn. ID consulted. Vancomycin stopped        Normocytic anemia  Patient's anemia is currently controlled. Has not received any PRBCs to date. Etiology likely d/t  chronic disease  Current CBC reviewed-   Lab Results   Component Value Date    HGB 9.1 (L) 01/21/2024    HCT 32.5 (L) 01/21/2024     Monitor serial CBC and transfuse if patient becomes hemodynamically unstable, symptomatic or H/H drops below 7/21.    Acute on chronic diastolic heart failure  Patient admitted with shortness of breath. His BNP is higher than ever before despite him appearing euvolemic.        EKG not able to see in MUSE- will need to find. Per Cardiology, no STEMI    Continue 40mg IV lasix QD. Monitor UOP. Likely change to PO lasix tomorrow  TTE normal EF, potential diastolic dysfunction  Cardiology consulted for NSTEMI- no ischemic eval planned         NSTEMI (non-ST elevated myocardial infarction)  Admitted with NSTEMI. No chest pain.  No results for input(s): "TROPONINI", "TROPONINIHS" in the " last 168 hours.    EKG with ischemic changes   He has know CAD  Started asa, plavix, heparin gtt, statin.   TTE EF 50-55%    Medical treat ment for NSTEMI per cardiology.        PEG (percutaneous endoscopic gastrostomy) status  Patient noted to have a percutaneous endoscopic gastrostomy tube in place. I have personally inspected the tube.Tube was placed prior to this admission There are no signs of drainage or infection around the site. The tube is patent. Medications have converted to liquid form if available.  Routine care to be done by wound care and nursing staff.   - resumed tube feeds         Tracheostomy present  Noted      ACP (advance care planning)  Advance Care Planning    Date: 01/11/2024  Patient transitioned to DNR initially and now on comfort focused care after discussions with pulmonology.           Hyperkalemia  This patient has hyperkalemia which is uncontrolled. We will monitor for arrhythmias with EKG or continuous telemetry. We will treat the hyperkalemia with Potassium Binders and Nebulized albuterol sulfate. The likely etiology of the hyperkalemia is   The patients latest potassium has been reviewed and the results are listed below  Recent Labs   Lab 01/21/24  0419   K 3.4*     resolved        Severe protein-calorie malnutrition  Nutrition consulted. Most recent weight and BMI monitored-     Measurements:  Wt Readings from Last 1 Encounters:   01/17/24 57.5 kg (126 lb 12.2 oz)   Body mass index is 18.72 kg/m².    Patient has been screened and assessed by RD.    Interventions/Recommendations (treatment strategy):     - continue tube feeds    Secondary malignant neoplasm of cervical lymph node     Cancer Staging   Squamous cell cancer of tongue  Staging form: Oral Cavity, AJCC 8th Edition  - Clinical stage from 1/2/2022: Stage NAFISA (cT2, cN2a, cM0) - Signed by Naeem Smalls MD on 1/2/2022  - Pathologic stage from 1/4/2022: Stage IVB (pT4a, pN3b, cM0) - Signed by Christopher Jay MD on  2/16/2022        COPD exacerbation   On nebulizer     Other hyperlipidemia  Continue statin      Primary hypertension  Chronic, controlled. Latest blood pressure and vitals reviewed-     Temp:  [97.1 °F (36.2 °C)-98.7 °F (37.1 °C)]   Pulse:  []   Resp:  [18-49]   BP: ()/(45-72)   SpO2:  [76 %-100 %] .   Home meds for hypertension were reviewed and noted below.   Hypertension Medications               furosemide (LASIX) 40 MG tablet Take 0.5 tablets (20 mg total) by mouth once daily.    metoprolol tartrate (LOPRESSOR) 25 MG tablet Take 0.5 tablets (12.5 mg total) by mouth 2 (two) times daily.          - hold BP Rx because BP is borderline low     Will utilize p.r.n. blood pressure medication only if patient's blood pressure greater than 180/110 and he develops symptoms such as worsening chest pain or shortness of breath.    Coronary artery disease involving native coronary artery of native heart without angina pectoris  Patient with known CAD s/p stent placement, which is uncontrolled Will continue ASA, Plavix, and Statin and monitor for S/Sx of angina/ACS. Continue to monitor on telemetry.   - see NSTEMI    Peripheral vascular disease  Known PAD from last angiogram 3/2023  Continue asa, plavix, statin           VTE Risk Mitigation (From admission, onward)           Ordered     heparin 25,000 units in dextrose 5% 250 mL (100 units/mL) infusion LOW INTENSITY nomogram - [facility]  Continuous        Question:  Begin at (units/kg/hr)  Answer:  12    01/13/24 0819     IP VTE LOW RISK PATIENT  Once         01/11/24 1648                    Discharge Planning   NISA: 1/19/2024     Code Status: DNR   Is the patient medically ready for discharge?:     Reason for patient still in hospital (select all that apply): Treatment and Consult recommendations  Discharge Plan A: Home Health   Discharge Delays: None known at this time            Nikita Castillo III, MD  Department of Hospital Medicine   [facility] [place] - Intensive Care

## 2024-01-21 NOTE — PROGRESS NOTES
[facility] [place] [facility]  Critical Care Medicine  Progress Note    Patient Name: Fareed Richard Jr.  MRN: [id number]  Admission Date: 1/11/2024  Hospital Length of Stay: 10 days  Code Status: DNR  Attending Provider: Nikita Castillo III, MD  Primary Care Provider: Kodi Tubbs MD   Principal Problem: Acute on chronic respiratory failure with hypoxia    Subjective:     HPI:  Mr. Richard is very well known to our team. 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 He was admitted a week ago with a new infection and completed antibiotic therapy on 1/17, however throughout the course of today he has had worsening secretions and increasing oxygen requirements. He has not had a fever but he feels like he does when his secretions are thick again.     Hospital/ICU Course:  No notes on file    Interval History: Mr. Richard continued to worsen throughout the night and his family was called to come in this morning.       Objective:     Vital Signs (Most Recent):  Temp: 98.7 °F (37.1 °C) (01/21/24 0701)  Pulse: 91 (01/21/24 1000)  Resp: (!) 23 (01/21/24 1000)  BP: 105/63 (01/21/24 1000)  SpO2: 98 % (01/21/24 1000) Vital Signs (24h Range):  Temp:  [97.1 °F (36.2 °C)-98.7 °F (37.1 °C)] 98.7 °F (37.1 °C)  Pulse:  [] 91  Resp:  [18-49] 23  SpO2:  [76 %-100 %] 98 %  BP: ()/(45-72) 105/63     Weight: 57.5 kg (126 lb 12.2 oz)  Body mass index is 18.72 kg/m².      Intake/Output Summary (Last 24 hours) at 1/21/2024 1104  Last data filed at 1/21/2024 1000  Gross per 24 hour   Intake 4692.39 ml   Output 1100 ml   Net 3592.39 ml        Physical Exam  Constitutional:       Appearance: He is normal weight. He is ill-appearing.   HENT:      Head: Normocephalic and atraumatic.      Nose: Nose normal.      Mouth/Throat:      Mouth: Mucous membranes are moist.   Eyes:      Conjunctiva/sclera: Conjunctivae normal.      Pupils: Pupils are equal, round, and  reactive to light.   Neck:      Comments: Trach in place with thick dark secretions   Cardiovascular:      Rate and Rhythm: Normal rate and regular rhythm.      Pulses: Normal pulses.   Pulmonary:      Effort: Tachypnea and accessory muscle usage present. No respiratory distress.      Breath sounds: Rhonchi present.   Abdominal:      General: Abdomen is flat. Bowel sounds are normal.   Musculoskeletal:         General: No swelling or deformity.   Skin:     General: Skin is warm.      Capillary Refill: Capillary refill takes less than 2 seconds.   Neurological:      General: No focal deficit present.      Mental Status: He is alert and oriented to person, place, and time.   Psychiatric:      Comments: Anxious             Review of Systems    Vents:  Oxygen Concentration (%): 80 (01/21/24 0744)    Lines/Drains/Airways       Drain  Duration                  Gastrostomy/Enterostomy 01/04/22 Percutaneous endoscopic gastrostomy (PEG)  days    Male External Urinary Catheter 01/13/24 1800 7 days              Airway  Duration             Adult Surgical Airway 03/16/23 1014 Shiley Cuffed 6.0 311 days              Peripheral Intravenous Line  Duration                  Peripheral IV - Single Lumen 01/11/24 0013 20 G Right Antecubital 10 days         Peripheral IV - Single Lumen 01/15/24 1810 Anterior;Left;Proximal Forearm 5 days                    Significant Labs:    CBC/Anemia Profile:  Recent Labs   Lab 01/20/24  0421 01/21/24  0419   WBC 7.30 7.06   HGB 10.5* 9.1*   HCT 37.6* 32.5*    193   * 102*   RDW 14.6* 14.7*        Chemistries:  Recent Labs   Lab 01/20/24  0421 01/21/24  0419   * 142   K 5.5* 3.4*   CL 99 97   CO2 40* 37*   BUN 29* 37*   CREATININE 1.1 1.1   CALCIUM 10.1 9.4   ALBUMIN 2.6* 2.2*   PROT 7.3 6.4   BILITOT 0.3 0.3   ALKPHOS 81 84   ALT 33 32   AST 42* 38   MG 1.9 1.9       All pertinent labs within the past 24 hours have been reviewed.    Significant Imaging:  I have reviewed  all pertinent imaging results/findings within the past 24 hours.    ABG  Recent Labs   Lab 01/18/24  2118   PH 7.397   PO2 58*   PCO2 58.2*   HCO3 35.8*   BE 9*     Assessment/Plan:     Pulmonary  * Acute on chronic respiratory failure with hypoxia  Worsening hypoxia throughout the day  Concern for worsening infection again- restart zosyn, repeat sputum culture, chronic pseudomonas infection  Continue bipap support continuously for now.   Bipap can be without a cuff- no need for change of his trach for bipap       Ventilator associated pneumonia  He has not been on the ventilator so unsure that he has a VAP at this point./     COPD exacerbation  Not concerned for acute COPD exacerbation- more likely pneumonia and now requiring bipap support.     Palliative Care  ACP (advance care planning)  Mr. Richard continued to worsen overnight and had bloody secretions with increased oxygen requirements. His wife and daughter are at his bedside and they both agree that he has been through so much and is tired. They would like to focus on his comfort at this time. We have discontinued all medications that do not assist with his anxiety or comfort. Have continued his antibiotics for now because they do help his secretions. Will plan for more conversations pending his clinical status over the next 24 hours.     Critical Care Time: 45 minutes  Critical secondary to Patient has a condition that poses threat to life and bodily function: Severe Respiratory Distress      Critical care was time spent personally by me on the following activities: development of treatment plan with patient or surrogate and bedside caregivers, discussions with consultants, evaluation of patient's response to treatment, examination of patient, ordering and performing treatments and interventions, ordering and review of laboratory studies, ordering and review of radiographic studies, pulse oximetry, re-evaluation of patient's condition. This critical care time  did not overlap with that of any other provider or involve time for any procedures.     Alessandra Meza MD  Critical Care Medicine  [facility] [place] - Intensive Care

## 2024-01-21 NOTE — PLAN OF CARE
Remains in ICU on BiPap throughout shift.  BP soft this shift; midodrine started, 500mL bolus admin per orders.  Quivie cath in place with adequate UOP this shift.  Pt c/o pain X 1, tylenol admin and effective.  Code status changed to DNR this shift.  POC reviewed with pt and spouse; expressed understanding.     Problem: Adult Inpatient Plan of Care  Goal: Plan of Care Review  Outcome: Ongoing, Not Progressing  Goal: Patient-Specific Goal (Individualized)  Outcome: Ongoing, Not Progressing  Goal: Absence of Hospital-Acquired Illness or Injury  Outcome: Ongoing, Not Progressing  Goal: Optimal Comfort and Wellbeing  Outcome: Ongoing, Not Progressing  Goal: Readiness for Transition of Care  Outcome: Ongoing, Not Progressing     Problem: Skin Injury Risk Increased  Goal: Skin Health and Integrity  Outcome: Ongoing, Not Progressing     Problem: Fall Injury Risk  Goal: Absence of Fall and Fall-Related Injury  Outcome: Ongoing, Not Progressing     Problem: Fluid Imbalance (Pneumonia)  Goal: Fluid Balance  Outcome: Ongoing, Not Progressing     Problem: Infection (Pneumonia)  Goal: Resolution of Infection Signs and Symptoms  Outcome: Ongoing, Not Progressing     Problem: Respiratory Compromise (Pneumonia)  Goal: Effective Oxygenation and Ventilation  Outcome: Ongoing, Not Progressing     Problem: Adjustment to Illness (Acute Coronary Syndrome)  Goal: Optimal Adaptation to Illness  Outcome: Ongoing, Not Progressing     Problem: Dysrhythmia (Acute Coronary Syndrome)  Goal: Normalized Cardiac Rhythm  Outcome: Ongoing, Not Progressing     Problem: Cardiac-Related Pain (Acute Coronary Syndrome)  Goal: Absence of Cardiac-Related Pain  Outcome: Ongoing, Not Progressing     Problem: Hemodynamic Instability (Acute Coronary Syndrome)  Goal: Effective Cardiac Pump Function  Outcome: Ongoing, Not Progressing     Problem: Tissue Perfusion (Acute Coronary Syndrome)  Goal: Adequate Tissue Perfusion  Outcome: Ongoing, Not Progressing      Problem: Arrhythmia/Dysrhythmia (Cardiac Catheterization)  Goal: Stable Heart Rate and Rhythm  Outcome: Ongoing, Not Progressing     Problem: Bleeding (Cardiac Catheterization)  Goal: Absence of Bleeding  Outcome: Ongoing, Not Progressing     Problem: Contrast-Induced Injury Risk (Cardiac Catheterization)  Goal: Absence of Contrast-Induced Injury  Outcome: Ongoing, Not Progressing     Problem: Embolism (Cardiac Catheterization)  Goal: Absence of Embolism Signs and Symptoms  Outcome: Ongoing, Not Progressing     Problem: Ongoing Anesthesia/Sedation Effects (Cardiac Catheterization)  Goal: Anesthesia/Sedation Recovery  Outcome: Ongoing, Not Progressing     Problem: Pain (Cardiac Catheterization)  Goal: Acceptable Pain Control  Outcome: Ongoing, Not Progressing     Problem: Vascular Access Protection (Cardiac Catheterization)  Goal: Absence of Vascular Access Complication  Outcome: Ongoing, Not Progressing

## 2024-01-22 PROBLEM — J95.851 VENTILATOR ASSOCIATED PNEUMONIA: Status: RESOLVED | Noted: 2023-11-22 | Resolved: 2024-01-22

## 2024-01-22 PROCEDURE — 25000003 PHARM REV CODE 250: Performed by: STUDENT IN AN ORGANIZED HEALTH CARE EDUCATION/TRAINING PROGRAM

## 2024-01-22 PROCEDURE — 27000221 HC OXYGEN, UP TO 24 HOURS

## 2024-01-22 PROCEDURE — 25000003 PHARM REV CODE 250: Performed by: INTERNAL MEDICINE

## 2024-01-22 PROCEDURE — 94660 CPAP INITIATION&MGMT: CPT

## 2024-01-22 PROCEDURE — 94640 AIRWAY INHALATION TREATMENT: CPT

## 2024-01-22 PROCEDURE — 99900035 HC TECH TIME PER 15 MIN (STAT)

## 2024-01-22 PROCEDURE — 63600175 PHARM REV CODE 636 W HCPCS: Mod: JZ,JG | Performed by: INTERNAL MEDICINE

## 2024-01-22 PROCEDURE — 94761 N-INVAS EAR/PLS OXIMETRY MLT: CPT

## 2024-01-22 PROCEDURE — 20000000 HC ICU ROOM

## 2024-01-22 PROCEDURE — 63600175 PHARM REV CODE 636 W HCPCS: Performed by: REGISTERED NURSE

## 2024-01-22 PROCEDURE — 25000242 PHARM REV CODE 250 ALT 637 W/ HCPCS: Performed by: HOSPITALIST

## 2024-01-22 PROCEDURE — 99291 CRITICAL CARE FIRST HOUR: CPT | Mod: ,,, | Performed by: NURSE PRACTITIONER

## 2024-01-22 PROCEDURE — 99223 1ST HOSP IP/OBS HIGH 75: CPT | Mod: ,,, | Performed by: REGISTERED NURSE

## 2024-01-22 PROCEDURE — 99900026 HC AIRWAY MAINTENANCE (STAT)

## 2024-01-22 RX ORDER — ALPRAZOLAM 0.5 MG/1
0.5 TABLET, ORALLY DISINTEGRATING ORAL EVERY 4 HOURS PRN
Status: DISCONTINUED | OUTPATIENT
Start: 2024-01-22 | End: 2024-01-25 | Stop reason: HOSPADM

## 2024-01-22 RX ADMIN — ACETYLCYSTEINE 4 ML: 100 INHALANT RESPIRATORY (INHALATION) at 02:01

## 2024-01-22 RX ADMIN — LEVALBUTEROL 1.25 MG: 1.25 SOLUTION, CONCENTRATE RESPIRATORY (INHALATION) at 02:01

## 2024-01-22 RX ADMIN — MORPHINE SULFATE 2 MG: 4 INJECTION, SOLUTION INTRAMUSCULAR; INTRAVENOUS at 10:01

## 2024-01-22 RX ADMIN — ACETYLCYSTEINE 4 ML: 100 INHALANT RESPIRATORY (INHALATION) at 08:01

## 2024-01-22 RX ADMIN — GLYCOPYRROLATE 0.2 MG: 0.2 INJECTION, SOLUTION INTRAMUSCULAR; INTRAVITREAL at 03:01

## 2024-01-22 RX ADMIN — PIPERACILLIN AND TAZOBACTAM 4.5 G: 4; .5 INJECTION, POWDER, LYOPHILIZED, FOR SOLUTION INTRAVENOUS; PARENTERAL at 10:01

## 2024-01-22 RX ADMIN — MORPHINE SULFATE 2 MG: 4 INJECTION, SOLUTION INTRAMUSCULAR; INTRAVENOUS at 03:01

## 2024-01-22 RX ADMIN — FLUOXETINE HYDROCHLORIDE 20 MG: 20 SOLUTION ORAL at 10:01

## 2024-01-22 RX ADMIN — GLYCOPYRROLATE 0.2 MG: 0.2 INJECTION, SOLUTION INTRAMUSCULAR; INTRAVITREAL at 10:01

## 2024-01-22 RX ADMIN — ACETYLCYSTEINE 4 ML: 100 INHALANT RESPIRATORY (INHALATION) at 07:01

## 2024-01-22 RX ADMIN — LEVALBUTEROL 1.25 MG: 1.25 SOLUTION, CONCENTRATE RESPIRATORY (INHALATION) at 08:01

## 2024-01-22 RX ADMIN — LEVALBUTEROL 1.25 MG: 1.25 SOLUTION, CONCENTRATE RESPIRATORY (INHALATION) at 07:01

## 2024-01-22 RX ADMIN — PIPERACILLIN AND TAZOBACTAM 4.5 G: 4; .5 INJECTION, POWDER, LYOPHILIZED, FOR SOLUTION INTRAVENOUS; PARENTERAL at 06:01

## 2024-01-22 RX ADMIN — PIPERACILLIN AND TAZOBACTAM 4.5 G: 4; .5 INJECTION, POWDER, LYOPHILIZED, FOR SOLUTION INTRAVENOUS; PARENTERAL at 01:01

## 2024-01-22 NOTE — SUBJECTIVE & OBJECTIVE
Interval History: NAEON. Remains on comfort care measures. Wife at bedside. Appreciative of the care given to her .     Review of Systems   Unable to perform ROS: Acuity of condition     Objective:     Vital Signs (Most Recent):  Temp: 98.2 °F (36.8 °C) (01/22/24 0805)  Pulse: 81 (01/22/24 1451)  Resp: (!) 24 (01/22/24 1451)  BP: 90/62 (01/22/24 1205)  SpO2: (!) 93 % (01/22/24 1451) Vital Signs (24h Range):  Temp:  [98 °F (36.7 °C)-98.9 °F (37.2 °C)] 98.2 °F (36.8 °C)  Pulse:  [81-98] 81  Resp:  [18-38] 24  SpO2:  [89 %-97 %] 93 %  BP: ()/(51-74) 90/62     Weight: 57.5 kg (126 lb 12.2 oz)  Body mass index is 18.72 kg/m².  No intake or output data in the 24 hours ending 01/22/24 1507      Physical Exam  Vitals and nursing note reviewed.   Constitutional:       General: He is sleeping. He is not in acute distress.     Appearance: He is well-developed.      Comments: +trach   HENT:      Head: Normocephalic and atraumatic.   Cardiovascular:      Rate and Rhythm: Normal rate and regular rhythm.      Heart sounds: Normal heart sounds.   Pulmonary:      Effort: Pulmonary effort is normal.      Comments: coarse  Abdominal:      General: There is no distension.      Palpations: Abdomen is soft.   Musculoskeletal:      Right lower leg: No edema.      Left lower leg: No edema.             Significant Labs: None    Significant Imaging:  none

## 2024-01-22 NOTE — NURSING
[facility] [place] [facility]  ICU Shift Summary  Date: 1/21/2024      Prehospitalization: Home  Admit Date / LOS : 1/11/2024/ 10 days    Diagnosis: Acute on chronic respiratory failure with hypoxia    Consults:        Active: Cardio, Palliative, Pulm CC, and SW       Needed: N/A     Code Status: DNR   Advanced Directive: Not Received    LDA:  Lines/Drains/Airways       Drain  Duration                  Gastrostomy/Enterostomy 01/04/22 Percutaneous endoscopic gastrostomy (PEG)  days    Male External Urinary Catheter 01/13/24 1800 8 days              Airway  Duration             Adult Surgical Airway 03/16/23 1014 Shiley Cuffed 6.0 311 days              Peripheral Intravenous Line  Duration                  Peripheral IV - Single Lumen 01/11/24 0013 20 G Right Antecubital 10 days         Peripheral IV - Single Lumen 01/15/24 1810 Anterior;Left;Proximal Forearm 6 days                  Central Lines/Site/Justification:Patient Does Not Have Central Line  Urinary Cath/Order/Justification:Patient Does Not Have Urinary Catheter    Vasopressors/Infusions:        GOALS: Volume/ Hemodynamic: N/A                     RASS: N/A    Pain Management: IV       Pain Controlled: yes     Rhythm: NSR    Respiratory Device: Bipap    Oxygen Concentration (%):  [40-80] 60                 Most Recent SBT/ SAT: N/A       MOVE Screen: FAIL  ICU Liberation: not applicable    VTE Prophylaxis: Mechanical  Mobility: Bedrest  Stress Ulcer Prophylaxis: No    Isolation: No active isolations    Dietary:   Current Diet Order   Procedures    Diet NPO Except for: Sips with Medication     Order Specific Question:   Except for     Answer:   Sips with Medication      Tolerance: not applicable  Advancement: no    I & O (24h):    Intake/Output Summary (Last 24 hours) at 1/21/2024 1826  Last data filed at 1/21/2024 1101  Gross per 24 hour   Intake 3133.04 ml   Output 450 ml   Net 2683.04 ml        Restraints: No    Significant Dates:  Post Op Date:  "N/A  Rescue Date: N/A  Imaging/ Diagnostics: N/A    Noteworthy Labs:  none    COVID Test: (--)  CBC/Anemia Labs: Coags:    Recent Labs   Lab 01/20/24 0421 01/21/24  0419   WBC 7.30 7.06   HGB 10.5* 9.1*   HCT 37.6* 32.5*    193   * 102*   RDW 14.6* 14.7*    Recent Labs   Lab 01/16/24  0445 01/17/24  0358   APTT 26.6 26.5        Chemistries:   Recent Labs   Lab 01/20/24  0421 01/21/24  0419   * 142   K 5.5* 3.4*   CL 99 97   CO2 40* 37*   BUN 29* 37*   CREATININE 1.1 1.1   CALCIUM 10.1 9.4   PROT 7.3 6.4   BILITOT 0.3 0.3   ALKPHOS 81 84   ALT 33 32   AST 42* 38   MG 1.9 1.9        Cardiac Enzymes: Ejection Fractions:    No results for input(s): "CPK", "CPKMB", "MB", "TROPONINI" in the last 72 hours. EF   Date Value Ref Range Status   03/20/2023 60 % Final        POCT Glucose: HbA1c:    Recent Labs   Lab 01/19/24  1729 01/20/24  1254 01/20/24  1826   POCTGLUCOSE 128* 177* 213*    Hemoglobin A1C   Date Value Ref Range Status   08/29/2023 5.8 (H) 4.0 - 5.6 % Final     Comment:     ADA Screening Guidelines:  5.7-6.4%  Consistent with prediabetes  >or=6.5%  Consistent with diabetes    High levels of fetal hemoglobin interfere with the HbA1C  assay. Heterozygous hemoglobin variants (HbS, HgC, etc)do  not significantly interfere with this assay.   However, presence of multiple variants may affect accuracy.             ICU LOS 3d 10h  Level of Care: Critical Care    Chart Check: 12 HR Done  Shift Summary/Plan for the shift: Remains in ICU on BiPap.  All meds d/c'd this shift.  Morphine given per PRN orders, xanax admin X 1.  TF d/c'd per orders.  BiPap with humidification started this shift.  No UOP this shift.  Family at bedside throughout shift.  Pt resting and appears comfortable.  POC reviewed with spouse.  Expressed understanding.   "

## 2024-01-22 NOTE — ASSESSMENT & PLAN NOTE
Mr. Richard continued to worsen overnight and had bloody secretions with increased oxygen requirements. His wife and daughter are at his bedside and they both agree that he has been through so much and is tired. They would like to focus on his comfort at this time. We have discontinued all medications that do not assist with his anxiety or comfort. Have continued his antibiotics for now because they do help his secretions.   Continue to focus on comfort.

## 2024-01-22 NOTE — ASSESSMENT & PLAN NOTE
Chronic, controlled. Latest blood pressure and vitals reviewed-     Temp:  [97.7 °F (36.5 °C)-98.9 °F (37.2 °C)]   Pulse:  [81-98]   Resp:  [17-38]   BP: ()/(51-74)   SpO2:  [89 %-97 %] .   Home meds for hypertension were reviewed and noted below.   Hypertension Medications               furosemide (LASIX) 40 MG tablet Take 0.5 tablets (20 mg total) by mouth once daily.    metoprolol tartrate (LOPRESSOR) 25 MG tablet Take 0.5 tablets (12.5 mg total) by mouth 2 (two) times daily.          - hold BP Rx because BP is borderline low     Will utilize p.r.n. blood pressure medication only if patient's blood pressure greater than 180/110 and he develops symptoms such as worsening chest pain or shortness of breath.

## 2024-01-22 NOTE — ASSESSMENT & PLAN NOTE
Discussed with palliative care; home hospice not an option. Will work on IP hospice placement  Discussed with wife, she is in agreement

## 2024-01-22 NOTE — CONSULTS
[facility] - Intensive Care  Palliative Medicine  Consult Note    Patient Name: Fareed Richard Jr.  MRN: [id number]  Admission Date: 1/11/2024  Hospital Length of Stay: 11 days  Code Status: DNR   Attending Provider: Yuridia Luevano MD  Consulting Provider: Trupti Beck NP  Primary Care Physician: Kodi Tubbs MD  Principal Problem:Acute on chronic respiratory failure with hypoxia    Patient information was obtained from patient, spouse/SO, past medical records, ER records, and primary team.      Inpatient consult to Palliative Care  Consult performed by: Trupti Beck NP  Consult ordered by: Yuridia Luevano MD  Reason for consult: end of life/ comfort focused care        Assessment/Plan:   Advance Care Planning     ACP (advance care planning)  1/22/2024 - Consult   - consult received; interval chart reviewed in detail; discussed pt with MDT during ICU rounds   - pt and wife known to myself and palliative medicine team from previous admission   - met with patient and wife at bedside; re-introduction to palliative medicine team and role in current care and admission   - pt now comfort focused care; wife with good insight that pt is near the end of his life; she has been his primary care giver for years   - GOC/ACP discussion; confirms wishes for continued comfort focused, end of life care; taking pt home with hospice does not align with GOC due to pt's acuity and needs; discussed option of inpatient hospice if/when aligns with GOC  - will plan for continued comfort focused care at hospital for now; does qualify for inpatient hospice as tube feeds stopped over the weekend and pt has significant symptom management needs, including suctioning/resp care   - emotional support provided; allowed time for questions, all answered   - updated MDT   - emotional support provided   - Allowed time for questions/concerns; all addressed; expressed availability of myself/palliative team for additional  questions/concerns     ENT  Tracheostomy present  - requiring frequent suctioning at this time; Robinul initiated to hopefully help    Pulmonary  * Acute on chronic respiratory failure with hypoxia  - pt with trach, currently with PNA and ongoing decline of respiratory failure   - secretions are also complicating respiratory status; IV Robinol started today and frequent suctioning   - PCCM consulted and following as well   - now comfort focused care     Ventilator associated pneumonia  - noted; see resp failure as well     Cardiac/Vascular  Acute on chronic diastolic heart failure  - EF 50-55% on most recent echo, diastolic dysfunction in prior echos   - significant cardiac history in the past including severe CAD, NSTEMI, and Afib   - contributes to overall appropriateness for end of life care and hospice if/when aligns with [facility]     Endocrine  Severe protein-calorie malnutrition  - pt has suffered from severe malnutrition for some time now, with PEG but overall table weight over last few admissions   - now with tube feeds stopped with comfort focused care; ice chips for comfort   - contributes to overall appropriateness for inpatient hospice if/when aligns with [facility]     GI  PEG (percutaneous endoscopic gastrostomy) status  - per bedside RN no difficulties with meds via PEG  - tube feed infusions stopped now; wife in agreement     Dysphagia  - ice chips for comfort as long as not worsening symptoms/respiratory status   - pt requesting them frequently and seems to be adding to comfort at this time     Palliative Care  Comfort measures only status  - began comfort focused care over the weekend   - discussed PRN regimen with bedside, RN to optimize comfort; plan for morphine IV and Robinol IV now to aid in comfort   - pt with adverse reaction to IV ativan in the past ( aggressive, hallucinations, etc); wife unsure of prior history with other IV options like Versed, but given multiple procedures in the past and no  adverse reports assuming tolerated well; IV versed potential option if IV option needed through end of life care as pt has suffered with severe anxiety throughout prior hospitalizations  - with pt still tolerating PO Meds through PEG well at this time, will continue PO alprazolam as this has worked well for pt in the past, updated order to disintegrating tablet for possible decrease in effect time   - ongoing assessment of pt and communication with MDT/bedside RN to achieve pt comfort     Thank you for your consult. I will follow-up with patient. Please contact us if you have any additional questions.    Total visit time: 70 minutes    70 min visit time including: face to face time in discussion of symptom assessment, and exploring options and burdens of offered treatments.  This also includes non-face to face time preparing to see the patient including chart review, obtaining and/or reviewing separately obtained history, documenting clinical information in the electronic or other health record, independently interpreting results and communicating results to the patient/family/caregiver, family discussions by phone if not able to be present, coordination of care with other specialists, and discharge planning.     ACP time spent included in visit time: goals of care, advanced care planning, emotional support, formulating and communicating prognosis, exploring burden/ benefit of various approaches of treatment.     Trupti Beck NP  Palliative Medicine  [facility]      Subjective:     HPI:   No notes on file    Hospital Course:  No notes on file    Past Medical History:   Diagnosis Date    Cancer     skin to Rt forearm and face    COPD (chronic obstructive pulmonary disease)     Hyperlipidemia     Hypertension     Pseudoaneurysm      Past Surgical History:   Procedure Laterality Date    ABDOMINAL SURGERY      stents placed in liver and large intestines, per patient    ANGIOGRAPHY OF AORTIC  ARCH  3/27/2023    Procedure: Aortogram, Aortic Arch;  Surgeon: Tim Bhatt MD;  Location: [facility] CATH LAB;  Service: Cardiology;;    AORTOGRAPHY WITH SERIALOGRAPHY N/A 3/27/2023    Procedure: AORTOGRAM, WITH SERIALOGRAPHY;  Surgeon: Tim Bhatt MD;  Location: [facility] CATH LAB;  Service: Cardiology;  Laterality: N/A;    CAROTID STENT      COLONOSCOPY N/A 09/27/2022    Procedure: COLONOSCOPY;  Surgeon: Donnie Peterson MD;  Location: [facility] ENDO (2ND FLR);  Service: Endoscopy;  Laterality: N/A;    COLONOSCOPY N/A 09/30/2022    Procedure: COLONOSCOPY;  Surgeon: Joy Cabrera MD;  Location: [facility] ENDO (2ND FLR);  Service: Endoscopy;  Laterality: N/A;    CORONARY STENT PLACEMENT  01/2000    DISSECTION OF NECK Bilateral 01/04/2022    Procedure: DISSECTION, NECK;  Surgeon: Naeem Smalls MD;  [facility];  Service: ENT;  Laterality: Bilateral;    ESOPHAGOGASTRODUODENOSCOPY N/A 09/27/2022    Procedure: EGD (ESOPHAGOGASTRODUODENOSCOPY);  Surgeon: Donnie Peterson MD;  Location: [facility] ENDO (2ND FLR);  Service: Endoscopy;  Laterality: N/A;    ESOPHAGOGASTRODUODENOSCOPY N/A 09/30/2022    Procedure: EGD (ESOPHAGOGASTRODUODENOSCOPY);  Surgeon: Joy Cabrera MD;  Location: [facility] ENDO (2ND FLR);  Service: Endoscopy;  Laterality: N/A;    ESOPHAGOGASTRODUODENOSCOPY W/ PEG N/A 01/04/2022    Procedure: EGD, WITH PEG TUBE INSERTION;  Surgeon: Anthony Calabrese MD;  [facility];  Service: General;  Laterality: N/A;    EYE SURGERY      Cataract bilateral    femoral stents      bilateral    FLAP PROCEDURE N/A 01/03/2022    Procedure: CREATION, FREE FLAP;  Surgeon: Naeem Smalls MD;  Location: [facility] OR 2ND FLR;  Service: ENT;  Laterality: N/A;    FLAP PROCEDURE Right 01/04/2022    Procedure: CREATION, FREE FLAP;  Surgeon: Stacey Conde MD;  Location: [facility] OR 2ND FLR;  Service: ENT;  Laterality: Right;  Ischemic start 1203  Ischemic stop 1353    GLOSSECTOMY N/A 01/04/2022    Procedure: GLOSSECTOMY;   Surgeon: Naeem Smalls MD;  [facility];  Service: ENT;  Laterality: N/A;    LEFT HEART CATHETERIZATION Left 3/23/2023    Procedure: Left heart cath;  Surgeon: Tacos Benitez MD;  Location: [facility] CATH LAB;  Service: Cardiology;  Laterality: Left;    PERCUTANEOUS TRANSLUMINAL BALLOON ANGIOPLASTY OF CORONARY ARTERY Left 3/27/2023    Procedure: Angioplasty-coronary;  Surgeon: Tim Bhatt MD;  Location: [facility] CATH LAB;  Service: Cardiology;  Laterality: Left;  not before 9am, R rad access, 6 Fr EBU 3.5 guide    RADICAL NECK DISSECTION Left 2022    Procedure: DISSECTION, NECK, RADICAL;  Surgeon: Naeem Smalls MD;  Location: [facility] OR [address] [place] [address];  Service: ENT;  Laterality: Left;    SKIN BIOPSY      SKIN CANCER EXCISION      STENT, CORONARY, MULTI VESSEL  3/27/2023    Procedure: Stent, Coronary, Multi Vessel;  Surgeon: Tim Bhatt MD;  Location: [facility] CATH LAB;  Service: Cardiology;;    TRACHEOTOMY N/A 2022    Procedure: TRACHEOTOMY;  Surgeon: Naeem Smalls MD;  Location: [facility] OR [address] [place] [address];  Service: ENT;  Laterality: N/A;    VASCULAR SURGERY       Review of patient's allergies indicates:   Allergen Reactions    Ativan [lorazepam] Anxiety     Medications:  Continuous Infusions:  Scheduled Meds:   acetylcysteine 100 mg/ml (10%)  4 mL Nebulization TID WAKE    FLUoxetine  20 mg Oral Daily    levalbuterol  1.25 mg Nebulization TID WAKE    piperacillin-tazobactam (Zosyn) IV (PEDS and ADULTS) (extended infusion is not appropriate)  4.5 g Intravenous Q8H     PRN Meds:albuterol-ipratropium, alprazolam ODT, ALPRAZolam, glycopyrrolate (PF), morphine, ondansetron    Family History    None       Tobacco Use    Smoking status: Former     Current packs/day: 0.00     Average packs/day: 2.0 packs/day for 55.0 years (110.0 ttl pk-yrs)     Types: Cigarettes     Start date: 1963     Quit date: 2018     Years since quittin.7    Smokeless tobacco: Never    Tobacco comments:      3/3 ppd x 40 yrs. Currently 3-4 cigarettes daily .He is trying  to quit. Is using a Vapor cigarettes  2-3 x's a day.   Substance and Sexual Activity    Alcohol use: Not Currently    Drug use: No    Sexual activity: Not Currently     Review of Systems   Unable to perform ROS: Acuity of condition     Objective:     Vital Signs (Most Recent):  Temp: 98.2 °F (36.8 °C) (01/22/24 0805)  Pulse: 84 (01/22/24 1005)  Resp: (!) 35 (01/22/24 1028)  BP: 100/62 (01/22/24 1005)  SpO2: (!) 94 % (01/22/24 1005) Vital Signs (24h Range):  Temp:  [98 °F (36.7 °C)-98.9 °F (37.2 °C)] 98.2 °F (36.8 °C)  Pulse:  [] 84  Resp:  [18-49] 35  SpO2:  [88 %-98 %] 94 %  BP: ()/(51-74) 100/62     Weight: 57.5 kg (126 lb 12.2 oz)  Body mass index is 18.72 kg/m².       Physical Exam  Vitals and nursing note reviewed.   Constitutional:       Appearance: He is cachectic.      Comments: Lying in bed   HENT:      Head: Normocephalic.      Comments: Temporal wasting; trach  Pulmonary:      Breath sounds: Rhonchi present.      Comments: Tachypnea and ongoing gurgling/access secretions  Abdominal:      Comments: PEG   Musculoskeletal:      Right lower leg: No edema.      Left lower leg: No edema.   Neurological:      Mental Status: He is easily aroused. He is lethargic.      Comments: Opens eyes to name        Advance Care Planning  Advance Directives:   Living Will: No    LaPOST: No    Do Not Resuscitate Status: Yes    Medical Power of : No (wife is legal surrogate decision maker and pt's preferred MPOA per conversations during prior hospitalizations)    Goals of Care: The family endorses that what is most important right now is to focus on symptom/pain control, quality of life, even if it means sacrificing a little time, and comfort and QOL     Accordingly, we have decided that the best plan to meet the patient's goals includes pivot to comfort-focused care       Significant Labs: All pertinent labs within the past 24 hours have  "been reviewed.  CBC:   Recent Labs   Lab 01/21/24  0419   WBC 7.06   HGB 9.1*   HCT 32.5*   *        BMP:  No results for input(s): "GLU", "NA", "K", "CL", "CO2", "BUN", "CREATININE", "CALCIUM", "MG" in the last 24 hours.  LFT:  Lab Results   Component Value Date    AST 38 01/21/2024    ALKPHOS 84 01/21/2024    BILITOT 0.3 01/21/2024     Albumin:   Albumin   Date Value Ref Range Status   01/21/2024 2.2 (L) 3.5 - 5.2 g/dL Final     Protein:   Total Protein   Date Value Ref Range Status   01/21/2024 6.4 6.0 - 8.4 g/dL Final     Lactic acid:   Lab Results   Component Value Date    LACTATE 1.8 01/11/2024    LACTATE 3.0 (H) 01/11/2024     Significant Imaging: I have reviewed all pertinent imaging results/findings within the past 24 hours.    I spent a total of 70 minutes on the day of the visit. This includes face to face time in discussion of goals of care, symptom assessment, coordination of care and emotional support.  This also includes non-face to face time preparing to see the patient (eg, review of tests/imaging), obtaining and/or reviewing separately obtained history, documenting clinical information in the electronic or other health record, independently interpreting results and communicating results to the patient/family/caregiver, or care coordinator.    Trupti Beck NP  Palliative Medicine  [facility] [place] - Intensive Care              "

## 2024-01-22 NOTE — SUBJECTIVE & OBJECTIVE
Past Medical History:   Diagnosis Date    Cancer     skin to Rt forearm and face    COPD (chronic obstructive pulmonary disease)     Hyperlipidemia     Hypertension     Pseudoaneurysm      Past Surgical History:   Procedure Laterality Date    ABDOMINAL SURGERY      stents placed in liver and large intestines, per patient    ANGIOGRAPHY OF AORTIC ARCH  3/27/2023    Procedure: Aortogram, Aortic Arch;  Surgeon: Tim Bhatt MD;  Location: [facility] CATH LAB;  Service: Cardiology;;    AORTOGRAPHY WITH SERIALOGRAPHY N/A 3/27/2023    Procedure: AORTOGRAM, WITH SERIALOGRAPHY;  Surgeon: Tim Bhatt MD;  Location: [facility] CATH LAB;  Service: Cardiology;  Laterality: N/A;    CAROTID STENT      COLONOSCOPY N/A 09/27/2022    Procedure: COLONOSCOPY;  Surgeon: Donnie Peterson MD;  [facility]  Service: Endoscopy;  Laterality: N/A;    COLONOSCOPY N/A 09/30/2022    Procedure: COLONOSCOPY;  Surgeon: Joy Cabrera MD;  [facility]  Service: Endoscopy;  Laterality: N/A;    CORONARY STENT PLACEMENT  01/2000    DISSECTION OF NECK Bilateral 01/04/2022    Procedure: DISSECTION, NECK;  Surgeon: Naeem Smalls MD;  [facility];  Service: ENT;  Laterality: Bilateral;    ESOPHAGOGASTRODUODENOSCOPY N/A 09/27/2022    Procedure: EGD (ESOPHAGOGASTRODUODENOSCOPY);  Surgeon: Donnie Peterson MD;  [facility]  Service: Endoscopy;  Laterality: N/A;    ESOPHAGOGASTRODUODENOSCOPY N/A 09/30/2022    Procedure: EGD (ESOPHAGOGASTRODUODENOSCOPY);  Surgeon: Joy Cabrera MD;  Location: [facility] ENDO (2ND FLR);  Service: Endoscopy;  Laterality: N/A;    ESOPHAGOGASTRODUODENOSCOPY W/ PEG N/A 01/04/2022    Procedure: EGD, WITH PEG TUBE INSERTION;  Surgeon: Anthony Calabrese MD;  [facility];  Service: General;  Laterality: N/A;    EYE SURGERY      Cataract bilateral    femoral stents      bilateral    FLAP PROCEDURE N/A 01/03/2022    Procedure: CREATION, FREE FLAP;   Surgeon: Naeem Smalls MD;  [facility];  Service: ENT;  Laterality: N/A;    FLAP PROCEDURE Right 01/04/2022    Procedure: CREATION, FREE FLAP;  Surgeon: Stacey Conde MD;  Location: [facility] OR 2ND FLR;  Service: ENT;  Laterality: Right;  Ischemic start 1203  Ischemic stop 1353    GLOSSECTOMY N/A 01/04/2022    Procedure: GLOSSECTOMY;  Surgeon: Naeem Smalls MD;  Location: [facility] OR [facility] FLR;  Service: ENT;  Laterality: N/A;    LEFT HEART CATHETERIZATION Left 3/23/2023    Procedure: Left heart cath;  Surgeon: Tacos Benitez MD;  Location: [facility] CATH LAB;  Service: Cardiology;  Laterality: Left;    PERCUTANEOUS TRANSLUMINAL BALLOON ANGIOPLASTY OF CORONARY ARTERY Left 3/27/2023    Procedure: Angioplasty-coronary;  Surgeon: Tim Bhatt MD;  Location: [facility] CATH LAB;  Service: Cardiology;  Laterality: Left;  not before 9am, R rad access, 6 Fr EBU 3.5 guide    RADICAL NECK DISSECTION Left 01/03/2022    Procedure: DISSECTION, NECK, RADICAL;  Surgeon: Naeem Smalls MD;  [facility];  Service: ENT;  Laterality: Left;    SKIN BIOPSY      SKIN CANCER EXCISION      STENT, CORONARY, MULTI VESSEL  3/27/2023    Procedure: Stent, Coronary, Multi Vessel;  Surgeon: Tim Bhatt MD;  Location: [facility] CATH LAB;  Service: Cardiology;;    TRACHEOTOMY N/A 01/04/2022    Procedure: TRACHEOTOMY;  Surgeon: Naeem Smalls MD;  [facility];  Service: ENT;  Laterality: N/A;    VASCULAR SURGERY       Review of patient's allergies indicates:   Allergen Reactions    Ativan [lorazepam] Anxiety     Medications:  Continuous Infusions:  Scheduled Meds:   acetylcysteine 100 mg/ml (10%)  4 mL Nebulization TID WAKE    FLUoxetine  20 mg Oral Daily    levalbuterol  1.25 mg Nebulization TID WAKE    piperacillin-tazobactam (Zosyn) IV (PEDS and ADULTS) (extended infusion is not appropriate)  4.5 g Intravenous Q8H     PRN Meds:albuterol-ipratropium, alprazolam ODT, ALPRAZolam,  glycopyrrolate (PF), morphine, ondansetron    Family History    None       Tobacco Use    Smoking status: Former     Current packs/day: 0.00     Average packs/day: 2.0 packs/day for 55.0 years (110.0 ttl pk-yrs)     Types: Cigarettes     Start date: 1963     Quit date: 2018     Years since quittin.7    Smokeless tobacco: Never    Tobacco comments:     3/3 ppd x 40 yrs. Currently 3-4 cigarettes daily .He is trying  to quit. Is using a Vapor cigarettes  2-3 x's a day.   Substance and Sexual Activity    Alcohol use: Not Currently    Drug use: No    Sexual activity: Not Currently     Review of Systems   Unable to perform ROS: Acuity of condition     Objective:     Vital Signs (Most Recent):  Temp: 98.2 °F (36.8 °C) (24 0805)  Pulse: 84 (24 1005)  Resp: (!) 35 (24 1028)  BP: 100/62 (24 1005)  SpO2: (!) 94 % (24 1005) Vital Signs (24h Range):  Temp:  [98 °F (36.7 °C)-98.9 °F (37.2 °C)] 98.2 °F (36.8 °C)  Pulse:  [] 84  Resp:  [18-49] 35  SpO2:  [88 %-98 %] 94 %  BP: ()/(51-74) 100/62     Weight: 57.5 kg (126 lb 12.2 oz)  Body mass index is 18.72 kg/m².       Physical Exam  Vitals and nursing note reviewed.   Constitutional:       Appearance: He is cachectic.      Comments: Lying in bed   HENT:      Head: Normocephalic.      Comments: Temporal wasting; trach  Pulmonary:      Breath sounds: Rhonchi present.      Comments: Tachypnea and ongoing gurgling/access secretions  Abdominal:      Comments: PEG   Musculoskeletal:      Right lower leg: No edema.      Left lower leg: No edema.   Neurological:      Mental Status: He is easily aroused. He is lethargic.      Comments: Opens eyes to name        Advance Care Planning   Advance Directives:   Living Will: No    LaPOST: No    Do Not Resuscitate Status: Yes    Medical Power of : No (wife is legal surrogate decision maker and pt's preferred MPOA per conversations during prior hospitalizations)    Goals of Care: The  "family endorses that what is most important right now is to focus on symptom/pain control, quality of life, even if it means sacrificing a little time, and comfort and QOL     Accordingly, we have decided that the best plan to meet the patient's goals includes pivot to comfort-focused care       Significant Labs: All pertinent labs within the past 24 hours have been reviewed.  CBC:   Recent Labs   Lab 01/21/24  0419   WBC 7.06   HGB 9.1*   HCT 32.5*   *        BMP:  No results for input(s): "GLU", "NA", "K", "CL", "CO2", "BUN", "CREATININE", "CALCIUM", "MG" in the last 24 hours.  LFT:  Lab Results   Component Value Date    AST 38 01/21/2024    ALKPHOS 84 01/21/2024    BILITOT 0.3 01/21/2024     Albumin:   Albumin   Date Value Ref Range Status   01/21/2024 2.2 (L) 3.5 - 5.2 g/dL Final     Protein:   Total Protein   Date Value Ref Range Status   01/21/2024 6.4 6.0 - 8.4 g/dL Final     Lactic acid:   Lab Results   Component Value Date    LACTATE 1.8 01/11/2024    LACTATE 3.0 (H) 01/11/2024     Significant Imaging: I have reviewed all pertinent imaging results/findings within the past 24 hours.  "

## 2024-01-22 NOTE — PLAN OF CARE
Remains in ICU on BiPap.  All meds d/c'd this shift.  Morphine given per PRN orders, xanax admin X 1.  TF d/c'd per orders.  BiPap with humidification started this shift.  No UOP this shift.  Family at bedside throughout shift.  Pt resting and appears comfortable.  POC reviewed with spouse.  Expressed understanding.     Problem: Adult Inpatient Plan of Care  Goal: Plan of Care Review  Outcome: Ongoing, Not Progressing  Goal: Patient-Specific Goal (Individualized)  Outcome: Ongoing, Not Progressing  Goal: Absence of Hospital-Acquired Illness or Injury  Outcome: Ongoing, Not Progressing  Goal: Optimal Comfort and Wellbeing  Outcome: Ongoing, Not Progressing  Goal: Readiness for Transition of Care  Outcome: Ongoing, Not Progressing     Problem: Skin Injury Risk Increased  Goal: Skin Health and Integrity  Outcome: Ongoing, Not Progressing     Problem: Fall Injury Risk  Goal: Absence of Fall and Fall-Related Injury  Outcome: Ongoing, Not Progressing     Problem: Fluid Imbalance (Pneumonia)  Goal: Fluid Balance  Outcome: Ongoing, Not Progressing     Problem: Infection (Pneumonia)  Goal: Resolution of Infection Signs and Symptoms  Outcome: Ongoing, Not Progressing     Problem: Respiratory Compromise (Pneumonia)  Goal: Effective Oxygenation and Ventilation  Outcome: Ongoing, Not Progressing     Problem: Adjustment to Illness (Acute Coronary Syndrome)  Goal: Optimal Adaptation to Illness  Outcome: Ongoing, Not Progressing     Problem: Dysrhythmia (Acute Coronary Syndrome)  Goal: Normalized Cardiac Rhythm  Outcome: Ongoing, Not Progressing     Problem: Cardiac-Related Pain (Acute Coronary Syndrome)  Goal: Absence of Cardiac-Related Pain  Outcome: Ongoing, Not Progressing     Problem: Hemodynamic Instability (Acute Coronary Syndrome)  Goal: Effective Cardiac Pump Function  Outcome: Ongoing, Not Progressing     Problem: Tissue Perfusion (Acute Coronary Syndrome)  Goal: Adequate Tissue Perfusion  Outcome: Ongoing, Not  Progressing     Problem: Arrhythmia/Dysrhythmia (Cardiac Catheterization)  Goal: Stable Heart Rate and Rhythm  Outcome: Ongoing, Not Progressing     Problem: Bleeding (Cardiac Catheterization)  Goal: Absence of Bleeding  Outcome: Ongoing, Not Progressing     Problem: Contrast-Induced Injury Risk (Cardiac Catheterization)  Goal: Absence of Contrast-Induced Injury  Outcome: Ongoing, Not Progressing     Problem: Embolism (Cardiac Catheterization)  Goal: Absence of Embolism Signs and Symptoms  Outcome: Ongoing, Not Progressing     Problem: Ongoing Anesthesia/Sedation Effects (Cardiac Catheterization)  Goal: Anesthesia/Sedation Recovery  Outcome: Ongoing, Not Progressing     Problem: Pain (Cardiac Catheterization)  Goal: Acceptable Pain Control  Outcome: Ongoing, Not Progressing     Problem: Vascular Access Protection (Cardiac Catheterization)  Goal: Absence of Vascular Access Complication  Outcome: Ongoing, Not Progressing

## 2024-01-22 NOTE — PROGRESS NOTES
[facility] [place] Intensive Care  Pulmonology  Progress Note    Patient Name: Fareed Richard Jr.  MRN: [id number]  Admission Date: 1/11/2024  Hospital Length of Stay: 11 days  Code Status: DNR  Attending Provider: Yuridia Luevano MD  Primary Care Provider: Kodi Tubbs MD   Principal Problem: Acute on chronic respiratory failure with hypoxia    Subjective:     Interval History: Continuing to focus on comfort. Copious amounts of brown, foul smelling liquid suctioned from mouth. Patient occasionally asking for ice chips. Support offered to wife.     Objective:     Vital Signs (Most Recent):  Temp: 98.2 °F (36.8 °C) (01/22/24 0805)  Pulse: 86 (01/22/24 1205)  Resp: (!) 31 (01/22/24 1205)  BP: 90/62 (01/22/24 1205)  SpO2: 95 % (01/22/24 1205) Vital Signs (24h Range):  Temp:  [98 °F (36.7 °C)-98.9 °F (37.2 °C)] 98.2 °F (36.8 °C)  Pulse:  [] 86  Resp:  [18-40] 31  SpO2:  [89 %-97 %] 95 %  BP: ()/(51-74) 90/62     Weight: 57.5 kg (126 lb 12.2 oz)  Body mass index is 18.72 kg/m².    No intake or output data in the 24 hours ending 01/22/24 1326     Physical Exam  Constitutional:       Appearance: He is normal weight. He is ill-appearing and toxic-appearing.   HENT:      Head: Normocephalic and atraumatic.      Nose: Nose normal.      Mouth/Throat:      Mouth: Mucous membranes are moist.   Eyes:      Conjunctiva/sclera: Conjunctivae normal.      Pupils: Pupils are equal, round, and reactive to light.   Neck:      Comments: Trach in place with thick dark secretions   Cardiovascular:      Rate and Rhythm: Normal rate and regular rhythm.      Pulses: Normal pulses.   Pulmonary:      Effort: Tachypnea and accessory muscle usage present. No respiratory distress.      Breath sounds: Rhonchi present.   Abdominal:      General: Abdomen is flat. Bowel sounds are normal.   Musculoskeletal:         General: No swelling or deformity.   Skin:     General: Skin is warm.      Capillary Refill: Capillary refill takes less than 2  seconds.   Neurological:      General: No focal deficit present.      Mental Status: He is alert and oriented to person, place, and time.         Review of Systems   Unable to perform ROS: Acuity of condition     Vents:  Oxygen Concentration (%): 60 (01/22/24 0426)    Lines/Drains/Airways       Drain  Duration                  Gastrostomy/Enterostomy 01/04/22 Percutaneous endoscopic gastrostomy (PEG)  days    Male External Urinary Catheter 01/13/24 1800 8 days              Airway  Duration             Adult Surgical Airway 03/16/23 1014 Shiley Cuffed 6.0 312 days              Peripheral Intravenous Line  Duration                  Peripheral IV - Single Lumen 01/11/24 0013 20 G Right Antecubital 11 days                    Significant Labs:    CBC/Anemia Profile:  Recent Labs   Lab 01/21/24 0419   WBC 7.06   HGB 9.1*   HCT 32.5*      *   RDW 14.7*        Chemistries:  Recent Labs   Lab 01/21/24 0419      K 3.4*   CL 97   CO2 37*   BUN 37*   CREATININE 1.1   CALCIUM 9.4   ALBUMIN 2.2*   PROT 6.4   BILITOT 0.3   ALKPHOS 84   ALT 32   AST 38   MG 1.9       All pertinent labs within the past 24 hours have been reviewed.    Significant Imaging:  I have reviewed all pertinent imaging results/findings within the past 24 hours.    ABG  Recent Labs   Lab 01/18/24 2118   PH 7.397   PO2 58*   PCO2 58.2*   HCO3 35.8*   BE 9*     Assessment/Plan:     Pulmonary  * Acute on chronic respiratory failure with hypoxia  Worsening hypoxia throughout the day  Concern for worsening infection again- restart zosyn, repeat sputum culture, chronic pseudomonas infection  Continue bipap support continuously for now.   Bipap can be without a cuff- no need for change of his trach for bipap   Transition to comfort care       COPD exacerbation  Not concerned for acute COPD exacerbation- more likely pneumonia and now requiring bipap support.   Transition to comfort care     Palliative Care  ACP (advance care  planning)  Mr. Richard continued to worsen overnight and had bloody secretions with increased oxygen requirements. His wife and daughter are at his bedside and they both agree that he has been through so much and is tired. They would like to focus on his comfort at this time. We have discontinued all medications that do not assist with his anxiety or comfort. Have continued his antibiotics for now because they do help his secretions.   Continue to focus on comfort.     Plan discussed with Dr. Mcmanus and Dr. Castillo.     Critical Care Time: 35 minutes  Critical care was time spent personally by me on the following activities: development of treatment plan with patient or surrogate and bedside caregivers, discussions with consultants, evaluation of patient's response to treatment, examination of patient, ordering and performing treatments and interventions, ordering and review of laboratory studies, ordering and review of radiographic studies, pulse oximetry, re-evaluation of patient's condition. This critical care time did not overlap with that of any other provider or involve time for any procedures.    Alicia Schofield NP  Pulmonology  [facility] [place] - Intensive Care

## 2024-01-22 NOTE — PLAN OF CARE
POC reviewed with wife at bedside, all questions and concerns answered, wife verbalized understanding. Patient is on Comfort measures. VSS throughout shift. Patient has received 2 doses of morphine. Patient has been resting comfortably. For further assessment please see MAR and Manolo   Problem: Adult Inpatient Plan of Care  Goal: Plan of Care Review  Outcome: Ongoing, Progressing  Goal: Patient-Specific Goal (Individualized)  Outcome: Ongoing, Progressing  Goal: Absence of Hospital-Acquired Illness or Injury  Outcome: Ongoing, Progressing  Goal: Optimal Comfort and Wellbeing  Outcome: Ongoing, Progressing  Goal: Readiness for Transition of Care  Outcome: Ongoing, Progressing     Problem: Skin Injury Risk Increased  Goal: Skin Health and Integrity  Outcome: Ongoing, Progressing     Problem: Fall Injury Risk  Goal: Absence of Fall and Fall-Related Injury  Outcome: Ongoing, Progressing     Problem: Fluid Imbalance (Pneumonia)  Goal: Fluid Balance  Outcome: Ongoing, Progressing     Problem: Infection (Pneumonia)  Goal: Resolution of Infection Signs and Symptoms  Outcome: Ongoing, Progressing     Problem: Respiratory Compromise (Pneumonia)  Goal: Effective Oxygenation and Ventilation  Outcome: Ongoing, Progressing     Problem: Adjustment to Illness (Acute Coronary Syndrome)  Goal: Optimal Adaptation to Illness  Outcome: Ongoing, Progressing     Problem: Dysrhythmia (Acute Coronary Syndrome)  Goal: Normalized Cardiac Rhythm  Outcome: Ongoing, Progressing     Problem: Cardiac-Related Pain (Acute Coronary Syndrome)  Goal: Absence of Cardiac-Related Pain  Outcome: Ongoing, Progressing     Problem: Hemodynamic Instability (Acute Coronary Syndrome)  Goal: Effective Cardiac Pump Function  Outcome: Ongoing, Progressing     Problem: Tissue Perfusion (Acute Coronary Syndrome)  Goal: Adequate Tissue Perfusion  Outcome: Ongoing, Progressing     Problem: Arrhythmia/Dysrhythmia (Cardiac Catheterization)  Goal: Stable Heart Rate  and Rhythm  Outcome: Ongoing, Progressing     Problem: Bleeding (Cardiac Catheterization)  Goal: Absence of Bleeding  Outcome: Ongoing, Progressing     Problem: Contrast-Induced Injury Risk (Cardiac Catheterization)  Goal: Absence of Contrast-Induced Injury  Outcome: Ongoing, Progressing     Problem: Embolism (Cardiac Catheterization)  Goal: Absence of Embolism Signs and Symptoms  Outcome: Ongoing, Progressing     Problem: Ongoing Anesthesia/Sedation Effects (Cardiac Catheterization)  Goal: Anesthesia/Sedation Recovery  Outcome: Ongoing, Progressing     Problem: Pain (Cardiac Catheterization)  Goal: Acceptable Pain Control  Outcome: Ongoing, Progressing     Problem: Vascular Access Protection (Cardiac Catheterization)  Goal: Absence of Vascular Access Complication  Outcome: Ongoing, Progressing     Problem: Coping Ineffective  Goal: Effective Coping  Outcome: Ongoing, Progressing

## 2024-01-22 NOTE — ASSESSMENT & PLAN NOTE
- pt with trach, currently with PNA and ongoing decline of respiratory failure   - secretions are also complicating respiratory status; IV Robinol started today and frequent suctioning   - PCCM consulted and following as well   - now comfort focused care

## 2024-01-22 NOTE — ASSESSMENT & PLAN NOTE
Worsening hypoxia throughout the day  Concern for worsening infection again- restart zosyn, repeat sputum culture, chronic pseudomonas infection  Continue bipap support continuously for now.   Bipap can be without a cuff- no need for change of his trach for bipap   Transition to comfort care

## 2024-01-22 NOTE — ASSESSMENT & PLAN NOTE
- per bedside RN no difficulties with meds via PEG  - tube feed infusions stopped now; wife in agreement

## 2024-01-22 NOTE — ASSESSMENT & PLAN NOTE
Comfort measures     Cancer Staging   Squamous cell cancer of tongue  Staging form: Oral Cavity, AJCC 8th Edition  - Clinical stage from 1/2/2022: Stage NAFISA (cT2, cN2a, cM0) - Signed by Naeem Smalls MD on 1/2/2022  - Pathologic stage from 1/4/2022: Stage IVB (pT4a, pN3b, cM0) - Signed by Christopher Jay MD on 2/16/2022

## 2024-01-22 NOTE — ASSESSMENT & PLAN NOTE
- pt has suffered from severe malnutrition for some time now, with PEG but overall table weight over last few admissions   - now with tube feeds stopped with comfort focused care; ice chips for comfort   - contributes to overall appropriateness for inpatient hospice if/when aligns with GOC

## 2024-01-22 NOTE — PROGRESS NOTES
[facility] Medicine  Progress Note    Patient Name: Fareed Richard Jr.  MRN: [id number]  Patient Class: IP- Inpatient   Admission Date: 1/11/2024  Length of Stay: 11 days  Attending Physician: Yuridia Luevano MD  Primary Care Provider: Kodi Tubbs MD      Subjective:     Principal Problem:Comfort measures only status      HPI:  Mr Fareed Richard Jr. 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 He is on 6L trach collar at home and receives tube feeding. He had shortness of breath, fever, and increased trach output. Denies chest pain.     In ED, he was tachycardic and tachypneic with SpO2 85% on 8L. He was given albuterol, vanc/zosyn, solumedrol. He was admitted to ICU.       Overview/Hospital Course:  Mr Fareed Richard Jr. is a 73 y.o. man who was admitted with acute on chronic hypoxic respiratory failure due to pneumonia, suspect recurrent Pseudomonas. Also with NSTEMI on admit in setting of known CAD. Started vanc, zosyn. Weaned O2. Cardiology consulted- due to severe CAD and complications with last angiogram <1 year ago, will plan medical management with heparin gtt, asa, plavix, and will NOT pursue ischemic evaluation. He is improving. Stepped down to floor. Cultures growing pseudomonas in sputum. On zosyn. Vancomycin stopped. ID consulted,continue with zosyn,on 40% FIO2.  Medical treat ment for NSTEMI per cardiology.  Has Afib with RVR,on floor,was send to ICU on amiodarone gtt,HR is improved.on maintains amio gtt and eliquis,cardiology is following.  Still on 98% FIO2,started on CPT and acetylcysteine nebulizer,consulted pulmonology.  On lokelma for hyperkalemia.  -patient with significant decline in respiratory status and after conversation with pulmonology and wife patient now transitioned to comfort measures.      Interval History:  LYRIC. Remains on comfort care measures. Wife at bedside. Appreciative of the care given to her .     Review of Systems   Unable to perform ROS: Acuity of condition     Objective:     Vital Signs (Most Recent):  Temp: 98.2 °F (36.8 °C) (01/22/24 0805)  Pulse: 81 (01/22/24 1451)  Resp: (!) 24 (01/22/24 1451)  BP: 90/62 (01/22/24 1205)  SpO2: (!) 93 % (01/22/24 1451) Vital Signs (24h Range):  Temp:  [98 °F (36.7 °C)-98.9 °F (37.2 °C)] 98.2 °F (36.8 °C)  Pulse:  [81-98] 81  Resp:  [18-38] 24  SpO2:  [89 %-97 %] 93 %  BP: ()/(51-74) 90/62     Weight: 57.5 kg (126 lb 12.2 oz)  Body mass index is 18.72 kg/m².  No intake or output data in the 24 hours ending 01/22/24 1507      Physical Exam  Vitals and nursing note reviewed.   Constitutional:       General: He is sleeping. He is not in acute distress.     Appearance: He is well-developed.      Comments: +trach   HENT:      Head: Normocephalic and atraumatic.   Cardiovascular:      Rate and Rhythm: Normal rate and regular rhythm.      Heart sounds: Normal heart sounds.   Pulmonary:      Effort: Pulmonary effort is normal.      Comments: coarse  Abdominal:      General: There is no distension.      Palpations: Abdomen is soft.   Musculoskeletal:      Right lower leg: No edema.      Left lower leg: No edema.             Significant Labs: None    Significant Imaging:  none    Assessment/Plan:      * Comfort measures only status  Discussed with palliative care; home hospice not an option. Will work on IP hospice placement  Discussed with wife, she is in agreement      Secondary malignant neoplasm of cervical lymph node  Comfort measures     Cancer Staging   Squamous cell cancer of tongue  Staging form: Oral Cavity, AJCC 8th Edition  - Clinical stage from 1/2/2022: Stage NAFISA (cT2, cN2a, cM0) - Signed by Naeem Smalls MD on 1/2/2022  - Pathologic stage from 1/4/2022: Stage IVB (pT4a, pN3b, cM0) - Signed by Christopher Jay MD on 2/16/2022        Atrial  fibrillation with RVR        Normocytic anemia       Acute on chronic diastolic heart failure          Acute on chronic respiratory failure with hypoxia       NSTEMI (non-ST elevated myocardial infarction)          PEG (percutaneous endoscopic gastrostomy) status  Routine PEG care        Tracheostomy present  Routine trach care      Hyperkalemia          Severe protein-calorie malnutrition       COPD exacerbation       Other hyperlipidemia         Primary hypertension       Coronary artery disease involving native coronary artery of native heart without angina pectoris       Peripheral vascular disease            VTE Risk Mitigation (From admission, onward)           Ordered     heparin 25,000 units in dextrose 5% 250 mL (100 units/mL) infusion LOW INTENSITY nomogram - [facility]  Continuous        Question:  Begin at (units/kg/hr)  Answer:  12    01/13/24 0819     IP VTE LOW RISK PATIENT  Once         01/11/24 1648                    Discharge Planning   NISA: 1/19/2024     Code Status: DNR   Is the patient medically ready for discharge?:     Reason for patient still in hospital (select all that apply): Pending disposition  Discharge Plan A: Home Health   Discharge Delays: None known at this time      Yuridia Luevano MD  Department of Hospital Medicine   [facility] - Intensive Care

## 2024-01-22 NOTE — SUBJECTIVE & OBJECTIVE
Interval History: Continuing to focus on comfort. Copious amounts of brown, foul smelling liquid suctioned from mouth. Patient occasionally asking for ice chips. Support offered to wife.     Objective:     Vital Signs (Most Recent):  Temp: 98.2 °F (36.8 °C) (01/22/24 0805)  Pulse: 86 (01/22/24 1205)  Resp: (!) 31 (01/22/24 1205)  BP: 90/62 (01/22/24 1205)  SpO2: 95 % (01/22/24 1205) Vital Signs (24h Range):  Temp:  [98 °F (36.7 °C)-98.9 °F (37.2 °C)] 98.2 °F (36.8 °C)  Pulse:  [] 86  Resp:  [18-40] 31  SpO2:  [89 %-97 %] 95 %  BP: ()/(51-74) 90/62     Weight: 57.5 kg (126 lb 12.2 oz)  Body mass index is 18.72 kg/m².    No intake or output data in the 24 hours ending 01/22/24 1326     Physical Exam  Constitutional:       Appearance: He is normal weight. He is ill-appearing and toxic-appearing.   HENT:      Head: Normocephalic and atraumatic.      Nose: Nose normal.      Mouth/Throat:      Mouth: Mucous membranes are moist.   Eyes:      Conjunctiva/sclera: Conjunctivae normal.      Pupils: Pupils are equal, round, and reactive to light.   Neck:      Comments: Trach in place with thick dark secretions   Cardiovascular:      Rate and Rhythm: Normal rate and regular rhythm.      Pulses: Normal pulses.   Pulmonary:      Effort: Tachypnea and accessory muscle usage present. No respiratory distress.      Breath sounds: Rhonchi present.   Abdominal:      General: Abdomen is flat. Bowel sounds are normal.   Musculoskeletal:         General: No swelling or deformity.   Skin:     General: Skin is warm.      Capillary Refill: Capillary refill takes less than 2 seconds.   Neurological:      General: No focal deficit present.      Mental Status: He is alert and oriented to person, place, and time.         Review of Systems   Unable to perform ROS: Acuity of condition     Vents:  Oxygen Concentration (%): 60 (01/22/24 0426)    Lines/Drains/Airways       Drain  Duration                  Gastrostomy/Enterostomy 01/04/22  Percutaneous endoscopic gastrostomy (PEG)  days    Male External Urinary Catheter 01/13/24 1800 8 days              Airway  Duration             Adult Surgical Airway 03/16/23 1014 Shiley Cuffed 6.0 312 days              Peripheral Intravenous Line  Duration                  Peripheral IV - Single Lumen 01/11/24 0013 20 G Right Antecubital 11 days                    Significant Labs:    CBC/Anemia Profile:  Recent Labs   Lab 01/21/24  0419   WBC 7.06   HGB 9.1*   HCT 32.5*      *   RDW 14.7*        Chemistries:  Recent Labs   Lab 01/21/24  0419      K 3.4*   CL 97   CO2 37*   BUN 37*   CREATININE 1.1   CALCIUM 9.4   ALBUMIN 2.2*   PROT 6.4   BILITOT 0.3   ALKPHOS 84   ALT 32   AST 38   MG 1.9       All pertinent labs within the past 24 hours have been reviewed.    Significant Imaging:  I have reviewed all pertinent imaging results/findings within the past 24 hours.

## 2024-01-22 NOTE — ASSESSMENT & PLAN NOTE
- began comfort focused care over the weekend   - discussed PRN regimen with bedside, RN to optimize comfort; plan for morphine IV and Robinol IV now to aid in comfort   - pt with adverse reaction to IV ativan in the past ( aggressive, hallucinations, etc); wife unsure of prior history with other IV options like Versed, but given multiple procedures in the past and no adverse reports assuming tolerated well; IV versed potential option if IV option needed through end of life care as pt has suffered with severe anxiety throughout prior hospitalizations  - with pt still tolerating PO Meds through PEG well at this time, will continue PO alprazolam as this has worked well for pt in the past, updated order to disintegrating tablet for possible decrease in effect time   - ongoing assessment of pt and communication with MDT/bedside RN to achieve pt comfort

## 2024-01-22 NOTE — ASSESSMENT & PLAN NOTE
- ice chips for comfort as long as not worsening symptoms/respiratory status   - pt requesting them frequently and seems to be adding to comfort at this time

## 2024-01-22 NOTE — ASSESSMENT & PLAN NOTE
1/22/2024 - Consult   - consult received; interval chart reviewed in detail; discussed pt with MDT during ICU rounds   - pt and wife known to myself and palliative medicine team from previous admission   - met with patient and wife at bedside; re-introduction to palliative medicine team and role in current care and admission   - pt now comfort focused care; wife with good insight that pt is near the end of his life; she has been his primary care giver for years   - GOC/ACP discussion; confirms wishes for continued comfort focused, end of life care; taking pt home with hospice does not align with GOC due to pt's acuity and needs; discussed option of inpatient hospice if/when aligns with GOC  - will plan for continued comfort focused care at hospital for now; does qualify for inpatient hospice as tube feeds stopped over the weekend and pt has significant symptom management needs, including suctioning/resp care   - emotional support provided; allowed time for questions, all answered   - updated MDT     - emotional support provided   - Allowed time for questions/concerns; all addressed; expressed availability of myself/palliative team for additional questions/concerns

## 2024-01-22 NOTE — ASSESSMENT & PLAN NOTE
- EF 50-55% on most recent echo, diastolic dysfunction in prior echos   - significant cardiac history in the past including severe CAD, NSTEMI, and Afib   - contributes to overall appropriateness for end of life care and hospice if/when aligns with GOC

## 2024-01-22 NOTE — ASSESSMENT & PLAN NOTE
Not concerned for acute COPD exacerbation- more likely pneumonia and now requiring bipap support.   Transition to comfort care    Xolair Counseling:  Patient informed of potential adverse effects including but not limited to fever, muscle aches, rash and allergic reactions.  The patient verbalized understanding of the proper use and possible adverse effects of Xolair.  All of the patient's questions and concerns were addressed.

## 2024-01-23 PROBLEM — E87.5 HYPERKALEMIA: Status: RESOLVED | Noted: 2022-03-14 | Resolved: 2024-01-23

## 2024-01-23 PROBLEM — C77.0 SECONDARY MALIGNANT NEOPLASM OF CERVICAL LYMPH NODE: Status: RESOLVED | Noted: 2022-02-16 | Resolved: 2024-01-23

## 2024-01-23 PROBLEM — I48.0 PAROXYSMAL ATRIAL FIBRILLATION WITH RVR: Status: ACTIVE | Noted: 2024-01-18

## 2024-01-23 PROBLEM — J44.1 COPD EXACERBATION: Status: RESOLVED | Noted: 2022-02-16 | Resolved: 2024-01-23

## 2024-01-23 PROCEDURE — 25000003 PHARM REV CODE 250: Performed by: HOSPITALIST

## 2024-01-23 PROCEDURE — 25000242 PHARM REV CODE 250 ALT 637 W/ HCPCS: Performed by: HOSPITALIST

## 2024-01-23 PROCEDURE — 25000003 PHARM REV CODE 250: Performed by: INTERNAL MEDICINE

## 2024-01-23 PROCEDURE — 99900035 HC TECH TIME PER 15 MIN (STAT)

## 2024-01-23 PROCEDURE — 27100171 HC OXYGEN HIGH FLOW UP TO 24 HOURS

## 2024-01-23 PROCEDURE — 94640 AIRWAY INHALATION TREATMENT: CPT

## 2024-01-23 PROCEDURE — 63600175 PHARM REV CODE 636 W HCPCS: Performed by: HOSPITALIST

## 2024-01-23 PROCEDURE — 20000000 HC ICU ROOM

## 2024-01-23 PROCEDURE — 94660 CPAP INITIATION&MGMT: CPT

## 2024-01-23 PROCEDURE — 94761 N-INVAS EAR/PLS OXIMETRY MLT: CPT

## 2024-01-23 PROCEDURE — 63600175 PHARM REV CODE 636 W HCPCS: Performed by: REGISTERED NURSE

## 2024-01-23 PROCEDURE — 99900026 HC AIRWAY MAINTENANCE (STAT)

## 2024-01-23 PROCEDURE — 63600175 PHARM REV CODE 636 W HCPCS: Mod: JZ,JG | Performed by: INTERNAL MEDICINE

## 2024-01-23 RX ADMIN — GLYCOPYRROLATE 0.2 MG: 0.2 INJECTION, SOLUTION INTRAMUSCULAR; INTRAVITREAL at 05:01

## 2024-01-23 RX ADMIN — ACETYLCYSTEINE 4 ML: 100 INHALANT RESPIRATORY (INHALATION) at 08:01

## 2024-01-23 RX ADMIN — MEROPENEM 1 G: 1 INJECTION, POWDER, FOR SOLUTION INTRAVENOUS at 04:01

## 2024-01-23 RX ADMIN — LEVALBUTEROL 1.25 MG: 1.25 SOLUTION, CONCENTRATE RESPIRATORY (INHALATION) at 08:01

## 2024-01-23 RX ADMIN — MORPHINE SULFATE 2 MG: 4 INJECTION, SOLUTION INTRAMUSCULAR; INTRAVENOUS at 09:01

## 2024-01-23 RX ADMIN — GLYCOPYRROLATE 0.2 MG: 0.2 INJECTION, SOLUTION INTRAMUSCULAR; INTRAVITREAL at 09:01

## 2024-01-23 RX ADMIN — PIPERACILLIN AND TAZOBACTAM 4.5 G: 4; .5 INJECTION, POWDER, LYOPHILIZED, FOR SOLUTION INTRAVENOUS; PARENTERAL at 01:01

## 2024-01-23 RX ADMIN — PIPERACILLIN AND TAZOBACTAM 4.5 G: 4; .5 INJECTION, POWDER, LYOPHILIZED, FOR SOLUTION INTRAVENOUS; PARENTERAL at 08:01

## 2024-01-23 RX ADMIN — MORPHINE SULFATE 2 MG: 4 INJECTION, SOLUTION INTRAMUSCULAR; INTRAVENOUS at 01:01

## 2024-01-23 RX ADMIN — LEVALBUTEROL 1.25 MG: 1.25 SOLUTION, CONCENTRATE RESPIRATORY (INHALATION) at 02:01

## 2024-01-23 RX ADMIN — FLUOXETINE HYDROCHLORIDE 20 MG: 20 SOLUTION ORAL at 08:01

## 2024-01-23 RX ADMIN — ACETYLCYSTEINE 4 ML: 100 INHALANT RESPIRATORY (INHALATION) at 02:01

## 2024-01-23 RX ADMIN — ALPRAZOLAM 0.5 MG: 0.5 TABLET ORAL at 01:01

## 2024-01-23 RX ADMIN — GLYCOPYRROLATE 0.2 MG: 0.2 INJECTION, SOLUTION INTRAMUSCULAR; INTRAVITREAL at 04:01

## 2024-01-23 RX ADMIN — MORPHINE SULFATE 2 MG: 4 INJECTION, SOLUTION INTRAMUSCULAR; INTRAVENOUS at 05:01

## 2024-01-23 NOTE — PLAN OF CARE
Problem: Adult Inpatient Plan of Care  Goal: Plan of Care Review  Outcome: Ongoing, Progressing  Goal: Patient-Specific Goal (Individualized)  Outcome: Ongoing, Progressing  Goal: Absence of Hospital-Acquired Illness or Injury  Outcome: Ongoing, Progressing  Goal: Optimal Comfort and Wellbeing  Outcome: Ongoing, Progressing  Goal: Readiness for Transition of Care  Outcome: Ongoing, Progressing     Problem: Skin Injury Risk Increased  Goal: Skin Health and Integrity  Outcome: Ongoing, Progressing     Problem: Fall Injury Risk  Goal: Absence of Fall and Fall-Related Injury  Outcome: Ongoing, Progressing     Problem: Fluid Imbalance (Pneumonia)  Goal: Fluid Balance  Outcome: Ongoing, Progressing     Problem: Infection (Pneumonia)  Goal: Resolution of Infection Signs and Symptoms  Outcome: Ongoing, Progressing     Problem: Respiratory Compromise (Pneumonia)  Goal: Effective Oxygenation and Ventilation  Outcome: Ongoing, Progressing     Problem: Adjustment to Illness (Acute Coronary Syndrome)  Goal: Optimal Adaptation to Illness  Outcome: Ongoing, Progressing     Problem: Dysrhythmia (Acute Coronary Syndrome)  Goal: Normalized Cardiac Rhythm  Outcome: Ongoing, Progressing     Problem: Cardiac-Related Pain (Acute Coronary Syndrome)  Goal: Absence of Cardiac-Related Pain  Outcome: Ongoing, Progressing     Problem: Hemodynamic Instability (Acute Coronary Syndrome)  Goal: Effective Cardiac Pump Function  Outcome: Ongoing, Progressing     Problem: Tissue Perfusion (Acute Coronary Syndrome)  Goal: Adequate Tissue Perfusion  Outcome: Ongoing, Progressing     Problem: Arrhythmia/Dysrhythmia (Cardiac Catheterization)  Goal: Stable Heart Rate and Rhythm  Outcome: Ongoing, Progressing     Problem: Bleeding (Cardiac Catheterization)  Goal: Absence of Bleeding  Outcome: Ongoing, Progressing     Problem: Contrast-Induced Injury Risk (Cardiac Catheterization)  Goal: Absence of Contrast-Induced Injury  Outcome: Ongoing, Progressing      Problem: Embolism (Cardiac Catheterization)  Goal: Absence of Embolism Signs and Symptoms  Outcome: Ongoing, Progressing     Problem: Ongoing Anesthesia/Sedation Effects (Cardiac Catheterization)  Goal: Anesthesia/Sedation Recovery  Outcome: Ongoing, Progressing     Problem: Pain (Cardiac Catheterization)  Goal: Acceptable Pain Control  Outcome: Ongoing, Progressing     Problem: Vascular Access Protection (Cardiac Catheterization)  Goal: Absence of Vascular Access Complication  Outcome: Ongoing, Progressing     Problem: Coping Ineffective  Goal: Effective Coping  Outcome: Ongoing, Progressing

## 2024-01-23 NOTE — NURSING
Nurses Note -- 4 Eyes      1/22/23   7:00 PM      Skin assessed during: Q Shift Change      [x] No Altered Skin Integrity Present    [x]Prevention Measures Documented      [] Yes- Altered Skin Integrity Present or Discovered   [] LDA Added if Not in Epic (Describe Wound)   [] New Altered Skin Integrity was Present on Admit and Documented in LDA   [] Wound Image Taken    Wound Care Consulted? No    Attending Nurse:  Reynold Medina RN/Staff Member:  KAMILAH Sandhu

## 2024-01-23 NOTE — ASSESSMENT & PLAN NOTE
He has severe protein calorie malnutrition with muscle and fat wasting. At home he takes tube feeding. Wife is inquiring if tube feeds can be resumed. Will start trickle feeds tomorrow if he remains awake and alert and if he can tolerate them.

## 2024-01-23 NOTE — ASSESSMENT & PLAN NOTE
He is on trach collar 6L at home. He has had worsening respiratory failure due to CHF exacerbation, NSTEMI, and recurrent Pseudomonas pneumonia. His CHF exacerbation was treated with IV lasix. He appears euvolemic. His NSTEMI was treated with asa, plavix, heparin gtt. That has now been discontinued as we are pursuing comfort focused treatment. His Pseudomonas was treated with zosyn, then cefepime. He is resistant to both.   - weaned from BiPAP to trach collar after discussion with wife to see if he can tolerate it. This will aid in her decision re disposition  - start meropenem for cefepime and zosyn resistant Pseudomonas  as she is not sure what disposition she wants  - she is encouraged that his breathing is improved and wonders if he will recover further.   - will plan informational hospice meeting with kalen

## 2024-01-23 NOTE — ASSESSMENT & PLAN NOTE
Results for orders placed during the hospital encounter of 01/11/24    Echo    Interpretation Summary    Left Ventricle: The left ventricle is normal in size. Mildly increased wall thickness. There is concentric hypertrophy. Normal wall motion. There is low normal systolic function with a visually estimated ejection fraction of 50 - 55%. Unable to assess diastolic function due to E-A fusion.    Right Ventricle: Normal right ventricular cavity size. Systolic function is normal.    Left Atrium: Left atrium is moderately dilated.    Right Atrium: Right atrium is mildly dilated.    Mitral Valve: There is mild regurgitation.    Tricuspid Valve: There is mild regurgitation.    Pulmonary Artery: The estimated pulmonary artery systolic pressure is 13 mmHg.    IVC/SVC: Normal venous pressure at 3 mmHg.    - treated with IV lasix on admission  - exacerbation now resolved.

## 2024-01-23 NOTE — ASSESSMENT & PLAN NOTE
He was admitted with NSTEMI. He had complications after last LHC in 2023. No plans for repeat LHC. He was previously treated with asa, plavix, heparin gtt. That has now been stopped. Pursuing comfort focused treatment.

## 2024-01-23 NOTE — PLAN OF CARE
01/23/24 1647   Discharge Reassessment   Assessment Type Discharge Planning Reassessment   Did the patient's condition or plan change since previous assessment? Yes     Referral sent to [facility] per Dr Nava's request.  Per Dr Coats spouse requesting informational meeting to discuss inpatient hospice.  Spouse requested hospice.    1/24 0942:  Spoke with Raya at [facility] who plans to come to the hospital for 1330.   called Mrs Richard to request that she be at the hospital in time to meet Raya.  No answer, detailed message left requesting call back.  Dr Nava notified of meeting.

## 2024-01-23 NOTE — ASSESSMENT & PLAN NOTE
Previously on medical management. These have now been stopped as we are pursing comfort focused treatment.

## 2024-01-23 NOTE — ASSESSMENT & PLAN NOTE
He has known severe CAD with NSTEMI present this admission. He had complications after last [facility] in 2023. No plans for repeat ischemic evaluation. He was previously treated with asa, plavix, heparin gtt. All of this has been discontinued as we are now pursuing comfort focused treatment.

## 2024-01-23 NOTE — ASSESSMENT & PLAN NOTE
Advance Care Planning     Date: 01/23/2024  Met with patient's wife at bedside. She notes that he is more interactive today than yesterday. She is wondering if hospice and comfort focused treatment is still the right decision or if she should give him more time to recover. She notes that he is incredibly dyspneic at baseline- unable to stand up without becoming short of breath. Discussed that sometimes patients will have temporary improvement in mental status as part of the dying process. Discussed that it is good that he is more awake and comfortable. Discussed that he still has very difficult disease- chronic respiratory failure that limits his quality of life, recurrent and increasingly resistant Pseudomonas pneumonias, NSTEMI and CAD that has no good intervention options and prior interventions caused harm, CHF, severe malnutrition. Discussed that his temporary improvement may be an option to get him home with hospice care. She states that she cannot perform full total care as we are currently doing in hospital. She is open to meeting with [facility] for informational meeting. She is encouraged that he may be able to spend some time on trach collar rather than continuous BiPAP. Confirmed DNR code status. Messaged  about setting up meeting with [facility]. Time spent = 30 minutes

## 2024-01-23 NOTE — ASSESSMENT & PLAN NOTE
Present earlier in admission. Associated with Pseudomonas pneumonia, NSTEMI, CHF exacerbation.   - he is now in NSR  - not on anticoagulation- comfort focused treatment.

## 2024-01-23 NOTE — NURSING
[facility], [facility]  Nurses Note -- 4 Eyes      1/23/2024       Skin assessed on: Q Shift      [x] No Pressure Injuries Present    [x]Prevention Measures Documented    [] Yes LDA  for Pressure Injury Previously documented     [] Yes New Pressure Injury Discovered   [] LDA for New Pressure Injury Added      Attending RN:  Sugar Resendiz RN     Second RN:  KAMILAH Flaherty

## 2024-01-23 NOTE — PLAN OF CARE
Problem: Adult Inpatient Plan of Care  Goal: Plan of Care Review  Outcome: Ongoing, Not Progressing  Goal: Patient-Specific Goal (Individualized)  Outcome: Ongoing, Not Progressing  Goal: Absence of Hospital-Acquired Illness or Injury  Outcome: Ongoing, Not Progressing  Goal: Optimal Comfort and Wellbeing  Outcome: Ongoing, Not Progressing  Goal: Readiness for Transition of Care  Outcome: Ongoing, Not Progressing     Problem: Skin Injury Risk Increased  Goal: Skin Health and Integrity  Outcome: Ongoing, Not Progressing     Problem: Fall Injury Risk  Goal: Absence of Fall and Fall-Related Injury  Outcome: Ongoing, Not Progressing     Problem: Fluid Imbalance (Pneumonia)  Goal: Fluid Balance  Outcome: Ongoing, Not Progressing     Problem: Infection (Pneumonia)  Goal: Resolution of Infection Signs and Symptoms  Outcome: Ongoing, Not Progressing     Problem: Respiratory Compromise (Pneumonia)  Goal: Effective Oxygenation and Ventilation  Outcome: Ongoing, Not Progressing     Problem: Adjustment to Illness (Acute Coronary Syndrome)  Goal: Optimal Adaptation to Illness  Outcome: Ongoing, Not Progressing     Problem: Dysrhythmia (Acute Coronary Syndrome)  Goal: Normalized Cardiac Rhythm  Outcome: Ongoing, Not Progressing     Problem: Cardiac-Related Pain (Acute Coronary Syndrome)  Goal: Absence of Cardiac-Related Pain  Outcome: Ongoing, Not Progressing     Problem: Hemodynamic Instability (Acute Coronary Syndrome)  Goal: Effective Cardiac Pump Function  Outcome: Ongoing, Not Progressing     Problem: Tissue Perfusion (Acute Coronary Syndrome)  Goal: Adequate Tissue Perfusion  Outcome: Ongoing, Not Progressing     Problem: Arrhythmia/Dysrhythmia (Cardiac Catheterization)  Goal: Stable Heart Rate and Rhythm  Outcome: Ongoing, Not Progressing     Problem: Bleeding (Cardiac Catheterization)  Goal: Absence of Bleeding  Outcome: Ongoing, Not Progressing     Problem: Contrast-Induced Injury Risk (Cardiac Catheterization)  Goal:  Absence of Contrast-Induced Injury  Outcome: Ongoing, Not Progressing     Problem: Embolism (Cardiac Catheterization)  Goal: Absence of Embolism Signs and Symptoms  Outcome: Ongoing, Not Progressing     Problem: Ongoing Anesthesia/Sedation Effects (Cardiac Catheterization)  Goal: Anesthesia/Sedation Recovery  Outcome: Ongoing, Not Progressing     Problem: Pain (Cardiac Catheterization)  Goal: Acceptable Pain Control  Outcome: Ongoing, Not Progressing     Problem: Vascular Access Protection (Cardiac Catheterization)  Goal: Absence of Vascular Access Complication  Outcome: Ongoing, Not Progressing     Problem: Coping Ineffective  Goal: Effective Coping  Outcome: Ongoing, Not Progressing

## 2024-01-23 NOTE — PROGRESS NOTES
[facility] Medicine  Progress Note    Patient Name: Fareed Richard Jr.  MRN: [id number]  Patient Class: IP- Inpatient   Admission Date: 1/11/2024  Length of Stay: 12 days  Attending Physician: Caprice Nava MD  Primary Care Provider: Kodi Tubbs MD        Subjective:     Principal Problem:Comfort measures only status        HPI:  Mr Fareed Richard Jr. 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 He is on 6L trach collar at home and receives tube feeding. He had shortness of breath, fever, and increased trach output. Denies chest pain.     In ED, he was tachycardic and tachypneic with SpO2 85% on 8L. He was given albuterol, vanc/zosyn, solumedrol. He was admitted to ICU.     Overview/Hospital Course:  Mr Fareed Richard Jr. is a 73 y.o. man who was admitted with acute on chronic hypoxic respiratory failure due to pneumonia, suspect recurrent Pseudomonas. Also with NSTEMI on admit in setting of known CAD. Started vanc, zosyn. Weaned O2. Cardiology consulted- due to severe CAD and complications with last angiogram <1 year ago, will plan medical management with heparin gtt, asa, plavix, and will NOT pursue ischemic evaluation. He improved and was stepped down to floor. However, he then developed Afib RVR. He moved to ICU on amiodarone gtt. His respiratory status worsened. He was transitioned to comfort measures but continued antibiotics and neb treatments. He continues to have Pseudomonas in trach aspirate, now resistant to zosyn and cefepime.     Despite this, he is more awake and alert on 1/23/24. His wife is wondering if he is appropriate for hospice care vs trying to get better and discharge to home. She acknowledges that he was very dyspneic with minimal exertion at home- e.g. out of breath when standing to turn on/off a light switch. He  is appropriate for hospice care if this is in his goals of care. Setting up informational meeting with [facility] per wife request. In meantime, changed antibiotics to meropenem to appropriately treat his persistent Pseudomonas.     Interval History: He is more awake and alert today. Wife is at bedside. Switched from BiPAP to trach collar to see if he will tolerate. He denies shortness of breath.     Review of Systems   Respiratory:  Negative for shortness of breath.    Cardiovascular:  Negative for chest pain.   Gastrointestinal:  Negative for abdominal pain.     Objective:     Vital Signs (Most Recent):  Temp: 96.9 °F (36.1 °C) (01/23/24 1515)  Pulse: 82 (01/23/24 1500)  Resp: (!) 24 (01/23/24 1500)  BP: (!) 83/57 (01/23/24 1500)  SpO2: (!) 94 % (01/23/24 1500) Vital Signs (24h Range):  Temp:  [96.9 °F (36.1 °C)-97.7 °F (36.5 °C)] 96.9 °F (36.1 °C)  Pulse:  [79-98] 82  Resp:  [14-40] 24  SpO2:  [91 %-100 %] 94 %  BP: ()/(56-79) 83/57     Weight: 57.5 kg (126 lb 12.2 oz)  Body mass index is 18.72 kg/m².    Intake/Output Summary (Last 24 hours) at 1/23/2024 1602  Last data filed at 1/23/2024 1345  Gross per 24 hour   Intake 407.46 ml   Output --   Net 407.46 ml         Physical Exam  Vitals and nursing note reviewed.   Constitutional:       Appearance: He is ill-appearing.   HENT:      Head:      Comments: Temporal wasting  Cardiovascular:      Rate and Rhythm: Normal rate and regular rhythm.   Pulmonary:      Comments: Increased effort. On trach collar  Neurological:      Mental Status: He is alert.     Exam limited- comfort measures only        Significant Labs: All pertinent labs within the past 24 hours have been reviewed.    Significant Imaging: I have reviewed all pertinent imaging results/findings within the past 24 hours.    Assessment/Plan:      * Comfort measures only status  Advance Care Planning    Date: 01/23/2024  Met with patient's wife at bedside. She notes that he is more interactive today than  yesterday. She is wondering if hospice and comfort focused treatment is still the right decision or if she should give him more time to recover. She notes that he is incredibly dyspneic at baseline- unable to stand up without becoming short of breath. Discussed that sometimes patients will have temporary improvement in mental status as part of the dying process. Discussed that it is good that he is more awake and comfortable. Discussed that he still has very difficult disease- chronic respiratory failure that limits his quality of life, recurrent and increasingly resistant Pseudomonas pneumonias, NSTEMI and CAD that has no good intervention options and prior interventions caused harm, CHF, severe malnutrition. Discussed that his temporary improvement may be an option to get him home with hospice care. She states that she cannot perform full total care as we are currently doing in hospital. She is open to meeting with [facility] for informational meeting. She is encouraged that he may be able to spend some time on trach collar rather than continuous BiPAP. Confirmed DNR code status. Messaged  about setting up meeting with [facility]. Time spent = 30 minutes             Paroxysmal atrial fibrillation with RVR  Present earlier in admission. Associated with Pseudomonas pneumonia, NSTEMI, CHF exacerbation.   - he is now in NSR  - not on anticoagulation- comfort focused treatment.       Normocytic anemia  Noted. No acute bleeding.      Acute on chronic diastolic heart failure  Results for orders placed during the hospital encounter of 01/11/24    Echo    Interpretation Summary    Left Ventricle: The left ventricle is normal in size. Mildly increased wall thickness. There is concentric hypertrophy. Normal wall motion. There is low normal systolic function with a visually estimated ejection fraction of 50 - 55%. Unable to assess diastolic function due to E-A fusion.    Right Ventricle: Normal right ventricular  cavity size. Systolic function is normal.    Left Atrium: Left atrium is moderately dilated.    Right Atrium: Right atrium is mildly dilated.    Mitral Valve: There is mild regurgitation.    Tricuspid Valve: There is mild regurgitation.    Pulmonary Artery: The estimated pulmonary artery systolic pressure is 13 mmHg.    IVC/SVC: Normal venous pressure at 3 mmHg.    - treated with IV lasix on admission  - exacerbation now resolved.         Acute on chronic respiratory failure with hypoxia  He is on trach collar 6L at home. He has had worsening respiratory failure due to CHF exacerbation, NSTEMI, and recurrent Pseudomonas pneumonia. His CHF exacerbation was treated with IV lasix. He appears euvolemic. His NSTEMI was treated with asa, plavix, heparin gtt. That has now been discontinued as we are pursuing comfort focused treatment. His Pseudomonas was treated with zosyn, then cefepime. He is resistant to both.   - weaned from BiPAP to trach collar after discussion with wife to see if he can tolerate it. This will aid in her decision re disposition  - start meropenem for cefepime and zosyn resistant Pseudomonas  as she is not sure what disposition she wants  - she is encouraged that his breathing is improved and wonders if he will recover further.   - will plan informational hospice meeting with [facility]     NSTEMI (non-ST elevated myocardial infarction)  He was admitted with NSTEMI. He had complications after last LHC in 2023. No plans for repeat LHC. He was previously treated with asa, plavix, heparin gtt. That has now been stopped. Pursuing comfort focused treatment.         PEG (percutaneous endoscopic gastrostomy) status  Routine PEG care        Tracheostomy present  Trach in place since glossectomy (cancer treatment) in 1/2022. He has recurrent Pseudomonas pneumonia.       Severe protein-calorie malnutrition  He has severe protein calorie malnutrition with muscle and fat wasting. At home he takes tube feeding. Wife  is inquiring if tube feeds can be resumed. Will start trickle feeds tomorrow if he remains awake and alert and if he can tolerate them.      Other hyperlipidemia  Statin is being held for comfort focused treatment.        Primary hypertension  BP has been running low. All BP Rx are being held.      Coronary artery disease involving native coronary artery of native heart without angina pectoris  He has known severe CAD with NSTEMI present this admission. He had complications after last [facility] in 2023. No plans for repeat ischemic evaluation. He was previously treated with asa, plavix, heparin gtt. All of this has been discontinued as we are now pursuing comfort focused treatment.      Peripheral vascular disease  Previously on medical management. These have now been stopped as we are pursing comfort focused treatment.           VTE Risk Mitigation (From admission, onward)           Ordered     heparin 25,000 units in dextrose 5% 250 mL (100 units/mL) infusion LOW INTENSITY nomogram - [facility]  Continuous        Question:  Begin at (units/kg/hr)  Answer:  12    01/13/24 0819     IP VTE LOW RISK PATIENT  Once         01/11/24 1648                    Discharge Planning   NISA: 1/19/2024     Code Status: DNR   Is the patient medically ready for discharge?:     Reason for patient still in hospital (select all that apply): Pending disposition  Discharge Plan A: Home Health   Discharge Delays: None known at this time        Critical care time spent on the evaluation and treatment of severe organ dysfunction, review of pertinent labs and imaging studies, discussions with consulting providers and discussions with patient/family: 35 minutes.      Caprice Nava MD  Department of Hospital Medicine   [facility] - Intensive Care

## 2024-01-23 NOTE — ASSESSMENT & PLAN NOTE
Trach in place since glossectomy (cancer treatment) in 1/2022. He has recurrent Pseudomonas pneumonia.

## 2024-01-23 NOTE — SUBJECTIVE & OBJECTIVE
Interval History: He is more awake and alert today. Wife is at bedside. Switched from BiPAP to trach collar to see if he will tolerate. He denies shortness of breath.     Review of Systems   Respiratory:  Negative for shortness of breath.    Cardiovascular:  Negative for chest pain.   Gastrointestinal:  Negative for abdominal pain.     Objective:     Vital Signs (Most Recent):  Temp: 96.9 °F (36.1 °C) (01/23/24 1515)  Pulse: 82 (01/23/24 1500)  Resp: (!) 24 (01/23/24 1500)  BP: (!) 83/57 (01/23/24 1500)  SpO2: (!) 94 % (01/23/24 1500) Vital Signs (24h Range):  Temp:  [96.9 °F (36.1 °C)-97.7 °F (36.5 °C)] 96.9 °F (36.1 °C)  Pulse:  [79-98] 82  Resp:  [14-40] 24  SpO2:  [91 %-100 %] 94 %  BP: ()/(56-79) 83/57     Weight: 57.5 kg (126 lb 12.2 oz)  Body mass index is 18.72 kg/m².    Intake/Output Summary (Last 24 hours) at 1/23/2024 1602  Last data filed at 1/23/2024 1345  Gross per 24 hour   Intake 407.46 ml   Output --   Net 407.46 ml         Physical Exam  Vitals and nursing note reviewed.   Constitutional:       Appearance: He is ill-appearing.   HENT:      Head:      Comments: Temporal wasting  Cardiovascular:      Rate and Rhythm: Normal rate and regular rhythm.   Pulmonary:      Comments: Increased effort. On trach collar  Neurological:      Mental Status: He is alert.     Exam limited- comfort measures only        Significant Labs: All pertinent labs within the past 24 hours have been reviewed.    Significant Imaging: I have reviewed all pertinent imaging results/findings within the past 24 hours.

## 2024-01-24 VITALS
BODY MASS INDEX: 18.77 KG/M2 | TEMPERATURE: 98 F | WEIGHT: 126.75 LBS | HEIGHT: 69 IN | DIASTOLIC BLOOD PRESSURE: 59 MMHG | HEART RATE: 107 BPM | RESPIRATION RATE: 36 BRPM | SYSTOLIC BLOOD PRESSURE: 85 MMHG | OXYGEN SATURATION: 97 %

## 2024-01-24 PROBLEM — Z71.89 ACP (ADVANCE CARE PLANNING): Status: ACTIVE | Noted: 2021-12-29

## 2024-01-24 LAB — SARS-COV-2 RDRP RESP QL NAA+PROBE: POSITIVE

## 2024-01-24 PROCEDURE — 94761 N-INVAS EAR/PLS OXIMETRY MLT: CPT

## 2024-01-24 PROCEDURE — 99900035 HC TECH TIME PER 15 MIN (STAT)

## 2024-01-24 PROCEDURE — 99900026 HC AIRWAY MAINTENANCE (STAT)

## 2024-01-24 PROCEDURE — 25000003 PHARM REV CODE 250: Performed by: REGISTERED NURSE

## 2024-01-24 PROCEDURE — 94660 CPAP INITIATION&MGMT: CPT

## 2024-01-24 PROCEDURE — 27100171 HC OXYGEN HIGH FLOW UP TO 24 HOURS

## 2024-01-24 PROCEDURE — 99233 SBSQ HOSP IP/OBS HIGH 50: CPT | Mod: ,,, | Performed by: REGISTERED NURSE

## 2024-01-24 PROCEDURE — 25000242 PHARM REV CODE 250 ALT 637 W/ HCPCS: Performed by: HOSPITALIST

## 2024-01-24 PROCEDURE — U0002 COVID-19 LAB TEST NON-CDC: HCPCS | Performed by: HOSPITALIST

## 2024-01-24 PROCEDURE — 94640 AIRWAY INHALATION TREATMENT: CPT

## 2024-01-24 PROCEDURE — 63600175 PHARM REV CODE 636 W HCPCS: Performed by: HOSPITALIST

## 2024-01-24 PROCEDURE — 99497 ADVNCD CARE PLAN 30 MIN: CPT | Mod: ,,, | Performed by: REGISTERED NURSE

## 2024-01-24 PROCEDURE — 25000003 PHARM REV CODE 250: Performed by: HOSPITALIST

## 2024-01-24 PROCEDURE — 63600175 PHARM REV CODE 636 W HCPCS: Mod: JZ,JG | Performed by: INTERNAL MEDICINE

## 2024-01-24 PROCEDURE — 63600175 PHARM REV CODE 636 W HCPCS: Performed by: REGISTERED NURSE

## 2024-01-24 RX ADMIN — MEROPENEM 1 G: 1 INJECTION, POWDER, FOR SOLUTION INTRAVENOUS at 01:01

## 2024-01-24 RX ADMIN — GLYCOPYRROLATE 0.2 MG: 0.2 INJECTION, SOLUTION INTRAMUSCULAR; INTRAVITREAL at 01:01

## 2024-01-24 RX ADMIN — GLYCOPYRROLATE 0.2 MG: 0.2 INJECTION, SOLUTION INTRAMUSCULAR; INTRAVITREAL at 05:01

## 2024-01-24 RX ADMIN — LEVALBUTEROL 1.25 MG: 1.25 SOLUTION, CONCENTRATE RESPIRATORY (INHALATION) at 02:01

## 2024-01-24 RX ADMIN — ALPRAZOLAM 0.5 MG: 0.5 TABLET, ORALLY DISINTEGRATING ORAL at 10:01

## 2024-01-24 RX ADMIN — ACETYLCYSTEINE 4 ML: 100 INHALANT RESPIRATORY (INHALATION) at 08:01

## 2024-01-24 RX ADMIN — LEVALBUTEROL 1.25 MG: 1.25 SOLUTION, CONCENTRATE RESPIRATORY (INHALATION) at 08:01

## 2024-01-24 RX ADMIN — MORPHINE SULFATE 2 MG: 4 INJECTION, SOLUTION INTRAMUSCULAR; INTRAVENOUS at 01:01

## 2024-01-24 RX ADMIN — MORPHINE SULFATE 2 MG: 4 INJECTION, SOLUTION INTRAMUSCULAR; INTRAVENOUS at 05:01

## 2024-01-24 RX ADMIN — ACETYLCYSTEINE 4 ML: 100 INHALANT RESPIRATORY (INHALATION) at 02:01

## 2024-01-24 NOTE — PROGRESS NOTES
"[facility] - Intensive Care  Palliative Medicine  Progress Note    Patient Name: Fareed Richard Jr.  MRN: [id number]  Admission Date: 1/11/2024  Hospital Length of Stay: 13 days  Code Status: DNR   Attending Provider: Caprice Nava MD  Consulting Provider: Trupti Beck NP  Primary Care Physician: Kodi Tubbs MD  Principal Problem:Comfort measures only status    Patient information was obtained from spouse/SO and primary team.      Assessment/Plan:   Advance Care Planning         ENT  Tracheostomy present  - requiring frequent suctioning at this time; Robinul helping with secretions/gurgling     Cardiac/Vascular  Acute on chronic diastolic heart failure  - EF 50-55% on most recent echo, diastolic dysfunction in prior echos   - significant cardiac history in the past including severe CAD, NSTEMI, and Afib   - contributes to overall appropriateness for end of life care and hospice if/when aligns with [facility]       Endocrine  Severe protein-calorie malnutrition  - pt has suffered from severe malnutrition for some time now, with PEG but overall table weight over last few admissions   - now with tube feeds stopped with comfort focused care; ice chips for comfort   - contributes to overall appropriateness for inpatient hospice if/when aligns with [facility]     Palliative Care  * Comfort measures only status  - continued comfort measures with plans for transition to inpatient hospice today; see ACP     ACP (advance care planning)  1/24/2024   - interval chart reviewed in detail; discussed pt with MDT during ICU rounds   - met with wife at bedside; she shares "rally" that pt experienced yesterday and her initial concern that she was moving too quickly with comfort focused care, however given report of pt status overnight and pt's status this morning she feels more confident in that decision   - she has good insight that pt is near the end of his life and transition to inpatient hospice remains best course of care for pt " at this time   - plans in place to meet with Raya, with [facility], this afternoon with potential transfer to their inpatient facility this evening   - emotional support provided; answered all questions   - provided wife with pitcher of water and coordinated with RN for ordering of bereavement tray for wife   - later discussed meeting with Raya from [facility], plan for transition today   - attempted to meet back with wife to address any post meeting questions, not at bedside   - ongoing communication with primary and CM/SW in person throughout the day       I will follow-up with patient. Please contact us if you have any additional questions.    Total visit time: 55 minutes    35 min visit time including: face to face time in discussion of symptom assessment, and exploring options and burdens of offered treatments.  This also includes non-face to face time preparing to see the patient including chart review, obtaining and/or reviewing separately obtained history, documenting clinical information in the electronic or other health record, independently interpreting results and communicating results to the patient/family/caregiver, family discussions by phone if not able to be present, coordination of care with other specialists, and discharge planning.     20 min ACP time spent: goals of care, advanced care planning, emotional support, formulating and communicating prognosis, exploring burden/ benefit of various approaches of treatment.     Trupti Beck NP  Palliative Medicine  [facility]      Subjective:     Chief Complaint:   Chief Complaint   Patient presents with    Shortness of Breath     Patient arrives via EMS with SOB since 1500. Trach, PEG, history of COPD. EMS reports initial SPO2 on his normal 6 LPM at 87% - increased to 91% with breathing treatment with wheezing. Now 85% - cannot speak, missing tongue.       HPI:   No notes on file    Hospital Course:  No notes on  file    Past Medical History:   Diagnosis Date    Cancer     skin to Rt forearm and face    COPD (chronic obstructive pulmonary disease)     Hyperlipidemia     Hypertension     Pseudoaneurysm      Past Surgical History:   Procedure Laterality Date    ABDOMINAL SURGERY      stents placed in liver and large intestines, per patient    ANGIOGRAPHY OF AORTIC ARCH  3/27/2023    Procedure: Aortogram, Aortic Arch;  Surgeon: Tim Bhatt MD;  Location: [facility] CATH LAB;  Service: Cardiology;;    AORTOGRAPHY WITH SERIALOGRAPHY N/A 3/27/2023    Procedure: AORTOGRAM, WITH SERIALOGRAPHY;  Surgeon: Tim Bhatt MD;  Location: [facility] CATH LAB;  Service: Cardiology;  Laterality: N/A;    CAROTID STENT      COLONOSCOPY N/A 09/27/2022    Procedure: COLONOSCOPY;  Surgeon: Donnie Peterson MD;  Location: [facility] ENDO (2ND FLR);  Service: Endoscopy;  Laterality: N/A;    COLONOSCOPY N/A 09/30/2022    Procedure: COLONOSCOPY;  Surgeon: Joy Cabrera MD;  [facility]  Service: Endoscopy;  Laterality: N/A;    CORONARY STENT PLACEMENT  01/2000    DISSECTION OF NECK Bilateral 01/04/2022    Procedure: DISSECTION, NECK;  Surgeon: Naeem Smalls MD;  [facility];  Service: ENT;  Laterality: Bilateral;    ESOPHAGOGASTRODUODENOSCOPY N/A 09/27/2022    Procedure: EGD (ESOPHAGOGASTRODUODENOSCOPY);  Surgeon: Donnie Peterson MD;  Location: [facility] (2ND FLR);  Service: Endoscopy;  Laterality: N/A;    ESOPHAGOGASTRODUODENOSCOPY N/A 09/30/2022    Procedure: EGD (ESOPHAGOGASTRODUODENOSCOPY);  Surgeon: Joy Cabrera MD;  Location: [facility] ENDO (2ND FLR);  Service: Endoscopy;  Laterality: N/A;    ESOPHAGOGASTRODUODENOSCOPY W/ PEG N/A 01/04/2022    Procedure: EGD, WITH PEG TUBE INSERTION;  Surgeon: Anthony Calabrese MD;  Location: [facility] OR 2ND FLR;  Service: General;  Laterality: N/A;    EYE SURGERY      Cataract bilateral    femoral stents      bilateral    FLAP PROCEDURE N/A 01/03/2022    Procedure: CREATION, FREE FLAP;   Surgeon: Naeem Smalls MD;  [facility];  Service: ENT;  Laterality: N/A;    FLAP PROCEDURE Right 01/04/2022    Procedure: CREATION, FREE FLAP;  Surgeon: Stacey Conde MD;  Location: [facility] OR 2ND FLR;  Service: ENT;  Laterality: Right;  Ischemic start 1203  Ischemic stop 1353    GLOSSECTOMY N/A 01/04/2022    Procedure: GLOSSECTOMY;  Surgeon: Naeem Smalls MD;  Location: [facility] OR [facility] FLR;  Service: ENT;  Laterality: N/A;    LEFT HEART CATHETERIZATION Left 3/23/2023    Procedure: Left heart cath;  Surgeon: Tacos Benitez MD;  Location: [facility] CATH LAB;  Service: Cardiology;  Laterality: Left;    PERCUTANEOUS TRANSLUMINAL BALLOON ANGIOPLASTY OF CORONARY ARTERY Left 3/27/2023    Procedure: Angioplasty-coronary;  Surgeon: Tim Bhatt MD;  Location: [facility] CATH LAB;  Service: Cardiology;  Laterality: Left;  not before 9am, R rad access, 6 Fr EBU 3.5 guide    RADICAL NECK DISSECTION Left 01/03/2022    Procedure: DISSECTION, NECK, RADICAL;  Surgeon: Naeem Smalls MD;  [facility];  Service: ENT;  Laterality: Left;    SKIN BIOPSY      SKIN CANCER EXCISION      STENT, CORONARY, MULTI VESSEL  3/27/2023    Procedure: Stent, Coronary, Multi Vessel;  Surgeon: Tim Bhatt MD;  Location: [facility] CATH LAB;  Service: Cardiology;;    TRACHEOTOMY N/A 01/04/2022    Procedure: TRACHEOTOMY;  Surgeon: Naeem Smalls MD;  [facility];  Service: ENT;  Laterality: N/A;    VASCULAR SURGERY       Review of patient's allergies indicates:   Allergen Reactions    Ativan [lorazepam] Anxiety     Medications:  Continuous Infusions:  Scheduled Meds:   acetylcysteine 100 mg/ml (10%)  4 mL Nebulization TID WAKE    FLUoxetine  20 mg Oral Daily    levalbuterol  1.25 mg Nebulization TID WAKE    meropenem (MERREM) IVPB  1 g Intravenous Q12H     PRN Meds:albuterol-ipratropium, alprazolam ODT, ALPRAZolam, glycopyrrolate (PF), morphine, ondansetron    Family History    None        Tobacco Use    Smoking status: Former     Current packs/day: 0.00     Average packs/day: 2.0 packs/day for 55.0 years (110.0 ttl pk-yrs)     Types: Cigarettes     Start date: 1963     Quit date: 2018     Years since quittin.7    Smokeless tobacco: Never    Tobacco comments:     3/3 ppd x 40 yrs. Currently 3-4 cigarettes daily .He is trying  to quit. Is using a Vapor cigarettes  2-3 x's a day.   Substance and Sexual Activity    Alcohol use: Not Currently    Drug use: No    Sexual activity: Not Currently     Review of Systems   Unable to perform ROS: Acuity of condition     Objective:     Vital Signs (Most Recent):  Temp: 98 °F (36.7 °C) (24 1145)  Pulse: 108 (24 1200)  Resp: 20 (24 1200)  BP: (!) 79/50 (24 1200)  SpO2: 99 % (24 1200) Vital Signs (24h Range):  Temp:  [96.9 °F (36.1 °C)-98 °F (36.7 °C)] 98 °F (36.7 °C)  Pulse:  [] 108  Resp:  [11-50] 20  SpO2:  [80 %-99 %] 99 %  BP: ()/(50-73) 79/50     Weight: 57.5 kg (126 lb 12.2 oz)  Body mass index is 18.72 kg/m².       Physical Exam  Vitals and nursing note reviewed.   Constitutional:       General: He is not in acute distress.     Appearance: He is cachectic. He is ill-appearing.      Comments: Lying in bed with eyes closed, difficult to arouse, allowed to rest    HENT:      Head: Normocephalic.      Comments: Temporal wasting; trach  Pulmonary:      Comments: Trach; improvement in gurgling/rhonchi   Abdominal:      Comments: PEG   Musculoskeletal:      Right lower leg: No edema.      Left lower leg: No edema.   Neurological:      Mental Status: He is lethargic.      Comments: Opens eyes to name        Advance Care Planning  Advance Directives:   Living Will: No    LaPOST: No (completed with hospice)    Do Not Resuscitate Status: Yes    Medical Power of : No (wife is legal surrogate decision maker and pt's preferred MPOA per conversations during prior hospitalizations)    Goals of Care: What is  "most important right now is to focus on symptom/pain control, quality of life, even if it means sacrificing a little time, comfort and QOL . Accordingly, we have decided that the best plan to meet the patient's goals includes continuing with comfort-focused care and transition to inpatient hospice.     Significant Labs: All pertinent labs within the past 24 hours have been reviewed.  CBC:   Recent Labs   Lab 01/21/24  0419   WBC 7.06   HGB 9.1*   HCT 32.5*   *          BMP:  No results for input(s): "GLU", "NA", "K", "CL", "CO2", "BUN", "CREATININE", "CALCIUM", "MG" in the last 24 hours.  LFT:  Lab Results   Component Value Date    AST 38 01/21/2024    ALKPHOS 84 01/21/2024    BILITOT 0.3 01/21/2024     Albumin:   Albumin   Date Value Ref Range Status   01/21/2024 2.2 (L) 3.5 - 5.2 g/dL Final     Protein:   Total Protein   Date Value Ref Range Status   01/21/2024 6.4 6.0 - 8.4 g/dL Final     Lactic acid:   Lab Results   Component Value Date    LACTATE 1.8 01/11/2024    LACTATE 3.0 (H) 01/11/2024     Significant Imaging: I have reviewed all pertinent imaging results/findings within the past 24 hours.    Trupti Beck NP  Palliative Medicine  [facility] - Intensive Care                "

## 2024-01-24 NOTE — PROGRESS NOTES
Pharmacist Renal Dose Adjustment Note    Fareed Richard Jr. is a 73 y.o. male being treated with the medication Meropenem    Patient Data:    Vital Signs (Most Recent):  Temp: 97 °F (36.1 °C) (01/24/24 0400)  Pulse: 109 (01/24/24 0800)  Resp: 11 (01/24/24 0800)  BP: 94/62 (01/24/24 0800)  SpO2: 97 % (01/24/24 0800) Vital Signs (72h Range):  Temp:  [96.9 °F (36.1 °C)-98.9 °F (37.2 °C)]   Pulse:  []   Resp:  [11-50]   BP: ()/(51-79)   SpO2:  [82 %-100 %]      Recent Labs   Lab 01/19/24  0334 01/20/24  0421 01/21/24  0419   CREATININE 1.0 1.1 1.1     Serum creatinine: 1.1 mg/dL 01/21/24 0419  Estimated creatinine clearance: 47.9 mL/min    Medication:Meropenem 1 gram every 8 hours will be changed to Meropenem 1 gram every 12 hours    Pharmacist's Name: Arnoldo Mccarthy Jr  Pharmacist's Extension: [phone]

## 2024-01-24 NOTE — PLAN OF CARE
01/24/24 1607   Post-Acute Status   Post-Acute Authorization Hospice   Hospice Status Set-up Complete/Auth obtained   Discharge Delays (!) PFC Arranged Transportation     Patient accepted at [facility].  Call report information given to nurseBelia.    ADT 30 order placed for Stretcher Transportation with [employer].  Requested  time:  1700  If transportation does not arrive at ETA time nurse will be instructed to follow protocol for transportation below:  How can I get in touch directly with dispatch, if needed?                 Non-emergent (stretcher): [phone]  option 6     ++NURSING:  If Stretcher does not arrive at requested time please call the above Non Emergent Dispatcher.  If issue not resolved please escalate to your charge nurse for further instructions.

## 2024-01-24 NOTE — PLAN OF CARE
[facility]  Department of Hospital Medicine  [address] [place] [address]  [phone] after hours  [phone] fax    HOSPICE  ORDERS    01/24/2024    Admit to Hospice:  Inpatient Service  Diagnoses:   Active Hospital Problems    Diagnosis  POA    *Comfort measures only status [Z51.5]  Not Applicable    Paroxysmal atrial fibrillation with RVR [I48.0]  Yes    Normocytic anemia [D64.9]  Yes    Acute on chronic respiratory failure with hypoxia [J96.21]  Yes    Acute on chronic diastolic heart failure [I50.33]  Yes    NSTEMI (non-ST elevated myocardial infarction) [I21.4]  Yes    Tracheostomy present [Z93.0]  Not Applicable     Tracheotomy at time of glossectomy by Dr. Smalls in January 2022.  Routine trach changes in ENT clinic since that time.    Most recent trach change on 10/9/2023 => currently with uncuffed 7.5mm inner diameter tracheostomy tube in place.        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 tongue         Hospice Qualifying Diagnoses:        Patient has a life expectancy < 6 months due to:  Primary Hospice Diagnosis: recurrent Pseudomonas pneumonia causing sepsis   Comorbid Conditions Contributing to Decline: severe protein-calorie malnutrition, coronary artery disease, heart failure     Vital Signs: Routine per Hospice Protocol.    Code Status:  DNR    Allergies:   Review of patient's allergies indicates:   Allergen Reactions    Ativan [lorazepam] Anxiety       Diet: NPO     Activities: As tolerated    Goals of Care Treatment Preferences:  Code Status: DNR          What is most important right now is to focus on symptom/pain control, quality of life, even if it means sacrificing a little time, comfort and QOL .  Accordingly, we have decided that the best plan to meet the patient's goals includes pivot to comfort-focused care.      Nursing: Per Hospice Routine    Routine Skin for Bedridden Patients: Apply moisture barrier cream to all skin folds and   wet areas in perineal area daily and after baths and all bowel movements.        Oxygen: trach collar 10L 100% FiO2      Medications:   Comfort care medications per hospice       Medication List        CONTINUE taking these medications      albuterol-ipratropium 2.5 mg-0.5 mg/3 mL nebulizer solution  Commonly known as: DUO-NEB  Take 3 mLs by nebulization every 4 (four) hours as needed for Wheezing. Rescue            STOP taking these medications      ascorbic acid (vitamin C) 100 MG tablet  Commonly known as: VITAMIN C     aspirin 81 MG Chew     atorvastatin 40 MG tablet  Commonly known as: LIPITOR     bisacodyL 10 mg Supp  Commonly known as: DULCOLAX     clopidogreL 75 mg tablet  Commonly known as: PLAVIX     CUVPOSA 1 mg/5 mL (0.2 mg/mL) Soln  Generic drug: glycopyrrolate     ferrous sulfate 325 (65 FE) MG EC tablet     FLUoxetine 20 mg/5 mL (4 mg/mL) solution  Commonly known as: PROZAC     folic acid 1 MG tablet  Commonly known as: FOLVITE     furosemide 40 MG tablet  Commonly known as: LASIX     hydrOXYzine HCL 25 MG tablet  Commonly known as: ATARAX     melatonin 3 mg tablet  Commonly known as: MELATIN     metoprolol tartrate 25 MG tablet  Commonly known as: LOPRESSOR     omeprazole 40 MG capsule  Commonly known as: PRILOSEC     polyethylene glycol 17 gram Pwpk  Commonly known as: GLYCOLAX     sodium  chloride 3% 3 % nebulizer solution     thiamine 50 MG tablet  Commonly known as: VITAMIN B-1     WIXELA INHUB 250-50 mcg/dose diskus inhaler  Generic drug: fluticasone-salmeterol 250-50 mcg/dose                  Future Orders:  Hospice Medical Director may dictate new orders for comfortable care measures & sign death certificate.          _________________________________  Caprice Nava MD  01/24/2024

## 2024-01-24 NOTE — PLAN OF CARE
Case Management Final Discharge Note      Discharge Disposition: [facility]    Primary Caretaker and contact information: spouse, Bridgett [phone]    Transportation: Ambulance ordered.        Spouse, Bridgett educated on discharge services and updated on DC plan. Bedside RN notified, patient clear to discharge from Case Management Perspective.

## 2024-01-24 NOTE — ASSESSMENT & PLAN NOTE
"1/24/2024   - interval chart reviewed in detail; discussed pt with MDT during ICU rounds   - met with wife at bedside; she shares "rally" that pt experienced yesterday and her initial concern that she was moving too quickly with comfort focused care, however given report of pt status overnight and pt's status this morning she feels more confident in that decision   - she has good insight that pt is near the end of his life and transition to inpatient hospice remains best course of care for pt at this time   - plans in place to meet with Raya, with [facility], this afternoon with potential transfer to their inpatient facility this evening   - emotional support provided; answered all questions   - provided wife with pitcher of water and coordinated with RN for ordering of bereavement tray for wife   - later discussed meeting with Raya from [facility], plan for transition today   - attempted to meet back with wife to address any post meeting questions, not at bedside   - ongoing communication with primary and CM/SW in person throughout the day   "

## 2024-01-24 NOTE — DISCHARGE SUMMARY
[facility]  Discharge Summary      Patient Name: Fareed Richard Jr.  MRN: [id number]  [facility]: [id number]  Patient Class: IP- Inpatient  Admission Date: 1/11/2024  Hospital Length of Stay: 13 days  Discharge Date and Time:  01/24/2024 1:37 PM  Attending Physician: Caprice Nava MD   Discharging Provider: Caprice Nava MD  Primary Care Provider: Kodi Tubbs MD    Primary Care Team: Networked reference to record PCT     HPI:   Mr Fareed Richard Jr. 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 He is on 6L trach collar at home and receives tube feeding. He had shortness of breath, fever, and increased trach output. Denies chest pain.     In ED, he was tachycardic and tachypneic with SpO2 85% on 8L. He was given albuterol, vanc/zosyn, solumedrol. He was admitted to ICU.     * No surgery found *      Hospital Course:   Mr Fareed Richard Jr. is a 73 y.o. man who was admitted with acute on chronic hypoxic respiratory failure due to pneumonia, suspect recurrent Pseudomonas. Also with NSTEMI on admit in setting of known CAD. Started vanc, zosyn. Weaned O2. Cardiology consulted- due to severe CAD and complications with last angiogram <1 year ago, will plan medical management with heparin gtt, asa, plavix, and will NOT pursue ischemic evaluation. He improved and was stepped down to floor. However, he then developed Afib RVR. He moved to ICU on amiodarone gtt. His respiratory status worsened. He was transitioned to comfort measures but continued antibiotics and neb treatments. He continues to have Pseudomonas in trach aspirate, now resistant to zosyn and cefepime.     Despite this, he is more awake and alert on 1/23/24. His wife is wondering if he is appropriate for hospice care vs trying to get better and discharge to home. She acknowledges  that he was very dyspneic with minimal exertion at home- e.g. out of breath when standing to turn on/off a light switch. He is appropriate for hospice care if this is in his goals of care. Setting up informational meeting with [facility] per wife request. In meantime, changed antibiotics to meropenem to appropriately treat his persistent Pseudomonas.     Ms Richard met with [facility]. Mr Richard has deteriorated in terms of his health. Plan now is inpatient hospice. He was discharged to inpatient [facility].      Goals of Care Treatment Preferences:  Code Status: DNR          What is most important right now is to focus on symptom/pain control, quality of life, even if it means sacrificing a little time, comfort and QOL .  Accordingly, we have decided that the best plan to meet the patient's goals includes pivot to comfort-focused care.    Advance Care Planning     Date: 01/24/2024  DNR code status. Discharging to inpatient [facility] hospice. Time spent 16 minutes.          Consults:   Consults (From admission, onward)          Status Ordering Provider     Inpatient consult to Palliative Care  Once        Provider:  Dorothea Patel MD    Completed RYNE PRESCOTT     Inpatient consult to Pulmonology  Once        Provider:  (Not yet assigned)    Completed SHIMON ZHONG     Inpatient consult to Infectious Diseases  Once        Provider:  (Not yet assigned)    Completed SHIMON ZHONG     Inpatient consult to General Surgery  Once        Provider:  (Not yet assigned)    Completed ZAC BAUM     Inpatient consult to Cardiology  Once        Provider:  Arturo Malone MD    Completed JEREMY BATISTA     Inpatient consult to Registered Dietitian/Nutritionist  Once        Provider:  (Not yet assigned)    Completed JEREMY BATISTA            No new Assessment & Plan notes have been filed under this hospital service since the last note was generated.  Service: Hospital Medicine    Final Active  Diagnoses:    Diagnosis Date Noted POA    PRINCIPAL PROBLEM:  Comfort measures only status [Z51.5] 01/21/2024 Not Applicable    Paroxysmal atrial fibrillation with RVR [I48.0] 01/18/2024 Yes    Normocytic anemia [D64.9] 11/21/2023 Yes    Acute on chronic respiratory failure with hypoxia [J96.21] 10/20/2023 Yes    Acute on chronic diastolic heart failure [I50.33] 10/20/2023 Yes    NSTEMI (non-ST elevated myocardial infarction) [I21.4] 03/25/2023 Yes    Tracheostomy present [Z93.0] 09/23/2022 Not Applicable    PEG (percutaneous endoscopic gastrostomy) status [Z93.1] 09/23/2022 Not Applicable    Severe protein-calorie malnutrition [E43] 02/16/2022 Yes    Primary hypertension [I10] 12/29/2021 Yes    Other hyperlipidemia [E78.49] 12/29/2021 Yes    Coronary artery disease involving native coronary artery of native heart without angina pectoris [I25.10] 12/29/2021 Yes    Peripheral vascular disease [I73.9] 03/18/2016 Yes      Problems Resolved During this Admission:    Diagnosis Date Noted Date Resolved POA    Ventilator associated pneumonia [J95.851] 11/22/2023 01/22/2024 Yes    Hyperkalemia [E87.5] 03/14/2022 01/23/2024 Yes    COPD exacerbation [J44.1] 02/16/2022 01/23/2024 Yes    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] 02/16/2022 01/23/2024 Yes       Discharged Condition: stable for transfer     Disposition: Hospice/Medical Facility    Follow Up: with hospice company     Patient Instructions:      REASON FOR NOT PRESCRIBING ANTIPLATELET MEDICATION AT DISCHARGE     Order Specific Question Answer Comments   Reason for not Prescribing: Other (Comment) hospice       Significant Diagnostic Studies: N/A    Pending Diagnostic Studies:       None           Medications:  Reconciled Home Medications:      Medication List        CONTINUE taking these medications      albuterol-ipratropium 2.5 mg-0.5 mg/3 mL nebulizer solution  Commonly known as: DUO-NEB  Take 3 mLs by nebulization every 4 (four) hours as needed  for Wheezing. Rescue            STOP taking these medications      ascorbic acid (vitamin C) 100 MG tablet  Commonly known as: VITAMIN C     aspirin 81 MG Chew     atorvastatin 40 MG tablet  Commonly known as: LIPITOR     bisacodyL 10 mg Supp  Commonly known as: DULCOLAX     clopidogreL 75 mg tablet  Commonly known as: PLAVIX     CUVPOSA 1 mg/5 mL (0.2 mg/mL) Soln  Generic drug: glycopyrrolate     ferrous sulfate 325 (65 FE) MG EC tablet     FLUoxetine 20 mg/5 mL (4 mg/mL) solution  Commonly known as: PROZAC     folic acid 1 MG tablet  Commonly known as: FOLVITE     furosemide 40 MG tablet  Commonly known as: LASIX     hydrOXYzine HCL 25 MG tablet  Commonly known as: ATARAX     melatonin 3 mg tablet  Commonly known as: MELATIN     metoprolol tartrate 25 MG tablet  Commonly known as: LOPRESSOR     omeprazole 40 MG capsule  Commonly known as: PRILOSEC     polyethylene glycol 17 gram Pwpk  Commonly known as: GLYCOLAX     sodium chloride 3% 3 % nebulizer solution     thiamine 50 MG tablet  Commonly known as: VITAMIN B-1     WIXELA INHUB 250-50 mcg/dose diskus inhaler  Generic drug: fluticasone-salmeterol 250-50 mcg/dose              Indwelling Lines/Drains at time of discharge:   Lines/Drains/Airways       Drain  Duration                  Gastrostomy/Enterostomy 01/04/22 Percutaneous endoscopic gastrostomy (PEG)  days              Airway  Duration             Adult Surgical Airway 03/16/23 1014 Shiley Cuffed 6.0 314 days                    Time spent on the discharge of patient: 35 minutes      Caprice Nava MD  Department of Hospital Medicine  [facility] - Intensive Care

## 2024-01-24 NOTE — SUBJECTIVE & OBJECTIVE
Past Medical History:   Diagnosis Date    Cancer     skin to Rt forearm and face    COPD (chronic obstructive pulmonary disease)     Hyperlipidemia     Hypertension     Pseudoaneurysm      Past Surgical History:   Procedure Laterality Date    ABDOMINAL SURGERY      stents placed in liver and large intestines, per patient    ANGIOGRAPHY OF AORTIC ARCH  3/27/2023    Procedure: Aortogram, Aortic Arch;  Surgeon: Tim Bhatt MD;  Location: [facility] CATH LAB;  Service: Cardiology;;    AORTOGRAPHY WITH SERIALOGRAPHY N/A 3/27/2023    Procedure: AORTOGRAM, WITH SERIALOGRAPHY;  Surgeon: Tim Bhatt MD;  Location: [facility] CATH LAB;  Service: Cardiology;  Laterality: N/A;    CAROTID STENT      COLONOSCOPY N/A 09/27/2022    Procedure: COLONOSCOPY;  Surgeon: Donnie Peterson MD;  Location: [facility]O [facility]  Service: Endoscopy;  Laterality: N/A;    COLONOSCOPY N/A 09/30/2022    Procedure: COLONOSCOPY;  Surgeon: Joy Cabrera MD;  Location: [facility] ([facility]);  Service: Endoscopy;  Laterality: N/A;    CORONARY STENT PLACEMENT  01/2000    DISSECTION OF NECK Bilateral 01/04/2022    Procedure: DISSECTION, NECK;  Surgeon: Naeem Smalls MD;  [facility];  Service: ENT;  Laterality: Bilateral;    ESOPHAGOGASTRODUODENOSCOPY N/A 09/27/2022    Procedure: EGD (ESOPHAGOGASTRODUODENOSCOPY);  Surgeon: Donnie Peterson MD;  Location: [facility] ENDO ([facility]);  Service: Endoscopy;  Laterality: N/A;    ESOPHAGOGASTRODUODENOSCOPY N/A 09/30/2022    Procedure: EGD (ESOPHAGOGASTRODUODENOSCOPY);  Surgeon: Joy Cabrera MD;  Location: [facility] ENDO (2ND FLR);  Service: Endoscopy;  Laterality: N/A;    ESOPHAGOGASTRODUODENOSCOPY W/ PEG N/A 01/04/2022    Procedure: EGD, WITH PEG TUBE INSERTION;  Surgeon: Anthony Calabrese MD;  [facility];  Service: General;  Laterality: N/A;    EYE SURGERY      Cataract bilateral    femoral stents      bilateral    FLAP PROCEDURE N/A 01/03/2022    Procedure: CREATION, FREE FLAP;   Surgeon: Naeem Smalls MD;  [facility];  Service: ENT;  Laterality: N/A;    FLAP PROCEDURE Right 01/04/2022    Procedure: CREATION, FREE FLAP;  Surgeon: Stacey Conde MD;  Location: [facility] OR 2ND FLR;  Service: ENT;  Laterality: Right;  Ischemic start 1203  Ischemic stop 1353    GLOSSECTOMY N/A 01/04/2022    Procedure: GLOSSECTOMY;  Surgeon: Naeem Smalls MD;  Location: [facility] OR [facility] FLR;  Service: ENT;  Laterality: N/A;    LEFT HEART CATHETERIZATION Left 3/23/2023    Procedure: Left heart cath;  Surgeon: Tacos Benitez MD;  Location: [facility] CATH LAB;  Service: Cardiology;  Laterality: Left;    PERCUTANEOUS TRANSLUMINAL BALLOON ANGIOPLASTY OF CORONARY ARTERY Left 3/27/2023    Procedure: Angioplasty-coronary;  Surgeon: Tim Bhatt MD;  Location: [facility] CATH LAB;  Service: Cardiology;  Laterality: Left;  not before 9am, R rad access, 6 Fr EBU 3.5 guide    RADICAL NECK DISSECTION Left 01/03/2022    Procedure: DISSECTION, NECK, RADICAL;  Surgeon: Naeem Smalls MD;  [facility];  Service: ENT;  Laterality: Left;    SKIN BIOPSY      SKIN CANCER EXCISION      STENT, CORONARY, MULTI VESSEL  3/27/2023    Procedure: Stent, Coronary, Multi Vessel;  Surgeon: Tim Bhatt MD;  Location: [facility] CATH LAB;  Service: Cardiology;;    TRACHEOTOMY N/A 01/04/2022    Procedure: TRACHEOTOMY;  Surgeon: Naeem Smalls MD;  [facility];  Service: ENT;  Laterality: N/A;    VASCULAR SURGERY       Review of patient's allergies indicates:   Allergen Reactions    Ativan [lorazepam] Anxiety     Medications:  Continuous Infusions:  Scheduled Meds:   acetylcysteine 100 mg/ml (10%)  4 mL Nebulization TID WAKE    FLUoxetine  20 mg Oral Daily    levalbuterol  1.25 mg Nebulization TID WAKE    meropenem (MERREM) IVPB  1 g Intravenous Q12H     PRN Meds:albuterol-ipratropium, alprazolam ODT, ALPRAZolam, glycopyrrolate (PF), morphine, ondansetron    Family History    None        Tobacco Use    Smoking status: Former     Current packs/day: 0.00     Average packs/day: 2.0 packs/day for 55.0 years (110.0 ttl pk-yrs)     Types: Cigarettes     Start date: 1963     Quit date: 2018     Years since quittin.7    Smokeless tobacco: Never    Tobacco comments:     3/3 ppd x 40 yrs. Currently 3-4 cigarettes daily .He is trying  to quit. Is using a Vapor cigarettes  2-3 x's a day.   Substance and Sexual Activity    Alcohol use: Not Currently    Drug use: No    Sexual activity: Not Currently     Review of Systems   Unable to perform ROS: Acuity of condition     Objective:     Vital Signs (Most Recent):  Temp: 98 °F (36.7 °C) (24 1145)  Pulse: 108 (24 1200)  Resp: 20 (24 1200)  BP: (!) 79/50 (24 1200)  SpO2: 99 % (24 1200) Vital Signs (24h Range):  Temp:  [96.9 °F (36.1 °C)-98 °F (36.7 °C)] 98 °F (36.7 °C)  Pulse:  [] 108  Resp:  [11-50] 20  SpO2:  [80 %-99 %] 99 %  BP: ()/(50-73) 79/50     Weight: 57.5 kg (126 lb 12.2 oz)  Body mass index is 18.72 kg/m².       Physical Exam  Vitals and nursing note reviewed.   Constitutional:       General: He is not in acute distress.     Appearance: He is cachectic. He is ill-appearing.      Comments: Lying in bed with eyes closed, difficult to arouse, allowed to rest    HENT:      Head: Normocephalic.      Comments: Temporal wasting; trach  Pulmonary:      Comments: Trach; improvement in gurgling/rhonchi   Abdominal:      Comments: PEG   Musculoskeletal:      Right lower leg: No edema.      Left lower leg: No edema.   Neurological:      Mental Status: He is lethargic.      Comments: Opens eyes to name        Advance Care Planning   Advance Directives:   Living Will: No    LaPOST: No (completed with hospice)    Do Not Resuscitate Status: Yes    Medical Power of : No (wife is legal surrogate decision maker and pt's preferred MPOA per conversations during prior hospitalizations)    Goals of Care: What is  "most important right now is to focus on symptom/pain control, quality of life, even if it means sacrificing a little time, comfort and QOL . Accordingly, we have decided that the best plan to meet the patient's goals includes continuing with comfort-focused care and transition to inpatient hospice.     Significant Labs: All pertinent labs within the past 24 hours have been reviewed.  CBC:   Recent Labs   Lab 01/21/24  0419   WBC 7.06   HGB 9.1*   HCT 32.5*   *          BMP:  No results for input(s): "GLU", "NA", "K", "CL", "CO2", "BUN", "CREATININE", "CALCIUM", "MG" in the last 24 hours.  LFT:  Lab Results   Component Value Date    AST 38 01/21/2024    ALKPHOS 84 01/21/2024    BILITOT 0.3 01/21/2024     Albumin:   Albumin   Date Value Ref Range Status   01/21/2024 2.2 (L) 3.5 - 5.2 g/dL Final     Protein:   Total Protein   Date Value Ref Range Status   01/21/2024 6.4 6.0 - 8.4 g/dL Final     Lactic acid:   Lab Results   Component Value Date    LACTATE 1.8 01/11/2024    LACTATE 3.0 (H) 01/11/2024     Significant Imaging: I have reviewed all pertinent imaging results/findings within the past 24 hours.  "

## 2024-01-25 DIAGNOSIS — U07.1 COVID-19 VIRUS DETECTED: ICD-10-CM

## 2024-01-25 NOTE — NURSING
EMS arrived to transfer patient to [facility] hospice. VSS. Pts wife alongside him.

## 2024-01-25 NOTE — NURSING
[facility]  Nurses Note -- 4 Eyes      1/24/2024       Skin assessed on: Q Shift      [x] No Pressure Injuries Present    [x]Prevention Measures Documented    [] Yes LDA  for Pressure Injury Previously documented     [] Yes New Pressure Injury Discovered   [] LDA for New Pressure Injury Added      Attending RN:  Urszula Resendiz RN     Second RN:  KAMILAH Rose

## 2024-09-30 NOTE — NURSING
12 Hour chart check complete. Orders reviewed. Pt remains in ICU oxygen demands has decreased since admission. O2 sats remain 90% or greater. Pt maintaining vital signs WDL. Pt doing well. Pt has no c/o of pain of discomfort at this time. Bed in  lowest position. Bedside side table and call light within reach.    [FreeTextEntry1] : 57 yr old man with a hx of HIV/AIDS diagnosed in 1991 (tested because of needle use) last used opioids 40 yrs ago, now here with anxiety and insomnia. 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  He says that this is generally not an issue with a regular license but a big issue when driving a commercial vehicle.  He admits to a past history of opioid use and was in a methadone program years ago.  He says that he currently only smokes cannabis for medical reasons.  He has also had multiple surgeries and now has an ostomy bag after colon surgery, and says this has also been a great source of anxiety.  He has been seeing a psychiatrist, Dr Yin  [phone] ext [phone], for many years but is worried that she may be soon retiring.  He sees her virtually and she prescribes Xanax twice daily which has not been helping with his anxiety.  Discussed the risk of taking a short acting benzodiazepine as Xanax and recommended switching to Clonazepam.  Pt has no thoughts of harming self or others and no thoughs of suicide.  He feels that his wife is a great support for him and says that he is able to speak to her.

## 2025-02-04 NOTE — NURSING
Notified Dr. Sargent of bloody sputum from mouth and trach. Pt was frequent suctioning and cleaning out mouth. Assessed mouth and no signs of bleeding. Suctioned trach and no more blood appeared. Dr. Sargent assessed and spoke with pt and wife at bedside.    [[id number]],[[id number]] [[id number]],[[id number]],[[id number]]

## 2025-04-10 NOTE — PROGRESS NOTES
[facility] Hospital Medicine  Progress Note    Patient Name: Fareed Richard Jr.  MRN: [id number]  Patient Class: IP- Inpatient   Admission Date: 9/23/2022  Length of Stay: 7 days  Attending Physician: Brenda Saldivar MD  Primary Care Provider: Kodi Tubbs MD        Subjective:     Principal Problem:Acute on chronic respiratory failure        HPI:  Mr. Fareed Richard Jr. is a 72 y.o. 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  History is obtained from wife at bedside.  She reports that at baseline, he is on 5L trach collar.  However, for the last 1-2 weeks he has been having worsening respiratory issues.  He is currently on a prolonged Prednisone taper.  [place] [facility] [place] hemoptysis and bloody trach secretions.  Hemoglobin was 8.4.  At that encounter, he was evaluated by ENT who felt like his airway was patent.  His hemoptysis stopped before leaving the ER.  He was discharged with Augmentin for aspiration pneumonia seen on CT scan.  However, on the day of admission he complained of chest pain, shortness of breath, and worsening fatigue.  Wife also endorsed yellow/green and foul colored trach secretions, but no fever or chills.  She suctioned him, which did not help - so she had to increase his home oxygen from 5L to 10L.  EMS was called and suctioned him, getting his oxygen saturations to 100% on 5L NC.  He denies any dark stools to his knowledge, but is on Aspirin and Plavix.  Chart review showed multiple respiratory cultures with Pseudomonas, unclear if colonization.    Upon arrival to the ER, vitals were temp 98.2F, HR 87, /78 satting 100% on 10L trach collar, 60% FiO2.  Labs showed Hemoglobin 5.5 and .  CXR showed pulmonary vascular and interstitial markings with effusions, no clear  consolidation.  HAYLEE was positive for melena.  He was given 1L NS, Vanc, Zosyn.  2 units PRBCs were ordered and he was given IV PPI.  He was admitted to Hospital Medicine for further management.      Overview/Hospital Course:  Mr. Richard was admitted to Hospital Medicine for management of acute on chronic respiratory failure 2/2 tracheitis/pneumonia, as well as a GIB.  He was started transfused 2 units PRBCs, started on IV PPI and GI was consulted.  He was started on Zosyn and Tobramycin nebs for pneumonia/tracheitis.  Resp cx returned with pan sensitive Pseudomonas.  ID was consulted.  He was changed from Zosyn to Levaquin given the high AMRITA.  GI attempted EGD/cscope 9/27, but the prep was poor for his cscope, and he desatted requiring 8L with sedation, so neither procedure was performed.  Repeat CT chest was ordered, as well as intense RT intervention to optimize respiratory status so that GI can perform the procedures at the end of the week when respiratory status has been optimized and is more stable.  CT chest showed multifocal pneumonia.      Interval History: No acute events overnight.  Hgb stable.  EGD/cscope today then DC after.     Review of Systems   Constitutional:  Negative for chills, fatigue and fever.   Respiratory:  Negative for cough and shortness of breath.    Cardiovascular:  Negative for chest pain, palpitations and leg swelling.   Gastrointestinal:  Negative for abdominal pain, diarrhea, nausea and vomiting.   Genitourinary:  Negative for dysuria and urgency.   Neurological:  Negative for dizziness and headaches.   All other systems reviewed and are negative.  Objective:     Vital Signs (Most Recent):  Temp: 98.2 °F (36.8 °C) (09/30/22 0957)  Pulse: 74 (09/30/22 0957)  Resp: 16 (09/30/22 0957)  BP: (!) 161/76 (09/30/22 0957)  SpO2: (!) 93 % (5L trach collar) (09/30/22 0957)   Vital Signs (24h Range):  Temp:  [96.7 °F (35.9 °C)-98.8 °F (37.1 °C)] 98.2 °F (36.8 °C)  Pulse:  [73-89] 74  Resp:   [16-22] 16  SpO2:  [78 %-98 %] 93 %  BP: ()/(57-76) 161/76     Weight: 59.9 kg (132 lb)  Body mass index is 23.38 kg/m².    Intake/Output Summary (Last 24 hours) at 9/30/2022 1047  Last data filed at 9/30/2022 0629  Gross per 24 hour   Intake 60 ml   Output 600 ml   Net -540 ml        Physical Exam  Constitutional:       Appearance: Normal appearance. He is well-developed.   HENT:      Head: Normocephalic and atraumatic.   Neck:      Comments: Trach  Cardiovascular:      Rate and Rhythm: Normal rate and regular rhythm.      Heart sounds: No murmur heard.  Pulmonary:      Effort: Pulmonary effort is normal. No respiratory distress.      Breath sounds: Normal breath sounds. No wheezing or rales.   Abdominal:      General: There is no distension.      Palpations: Abdomen is soft.      Tenderness: There is no abdominal tenderness.      Comments: Peg   Musculoskeletal:         General: No deformity.   Skin:     General: Skin is warm.      Coloration: Skin is not pale.   Neurological:      General: No focal deficit present.      Mental Status: He is alert and oriented to person, place, and time. Mental status is at baseline.       Significant Labs: All pertinent labs within the past 24 hours have been reviewed.  CBC:   Recent Labs   Lab 09/30/22  0317   WBC 5.15   HGB 8.4*   HCT 28.0*          Respiratory Culture:   No results for input(s): GSRESP, RESPIRATORYC in the last 48 hours.      Significant Imaging: I have reviewed all pertinent imaging results/findings within the past 24 hours.      Assessment/Plan:      * Acute on chronic respiratory failure  · On 5L trach collar at home, but requiring 10L trach collar on admit, down to home 5L  · Likely 2/2 tracheitis and pneumonia  · Continue Prednisone taper from outpatient - 10mg x  7 days  · Repeat CT chest showed multifocal pneumonia  · Aggressive RT  · Management as below    Pneumonia  · As above      Acute tracheitis  · Recently with hemoptysis and now  yellow/green and foul smelling secretions from his trach and rhonchi - recent CT chest 9/14 with pneumonia  · Trach culture with Pseudomonas - Previous resp cx with Pseudomonas, unclear if colonization  · Started on Zosyn 9/23, changed to Levaquin by ID 9/24 because of high AMRITA.  Plan for 7 days (end date 10/1)  · Continue Tobra nebs  · Can consider ENT eval      Gastrointestinal hemorrhage with melena  · GIB Pathway initiated  · Hgb 5.5 on admit, baseline around 8 as of 2 weeks ago - now stable s/p 2 units PRBCs  · Continue Protonix 40mg IV BID  · GI consulted, appreciate assistance  · EGD/cscope 9/27 aborted because of desats and poor prep  · Plan for repeat procedures 9/30  · Transfuse for Hemoglobin <7        Tracheostomy present  · On 5L trach collar at home      Acute blood loss anemia  · See Melena      Elevated brain natriuretic peptide (BNP) level  · BNP newly elevated at 468  · 2D echo with EF 55% and G1DD  · Lasix x 2 to help breathing      PEG (percutaneous endoscopic gastrostomy) status  · See Dysphagia      Dysphagia  · S/p peg  · On TF - Dietary following      Other hyperlipidemia  · Chronic and stable  · Continue Statin      Primary hypertension  · Chronic issue  · BP now up to 150s  · Continue Metoprolol      Coronary artery disease involving native coronary artery of native heart without angina pectoris  · Chronic and stable  · Hold Aspirin and Plavix with GIB  · Continue Statin  · Continue Metoprolol      Squamous cell cancer of tongue  · S/p chemo, radiation  · Now with flap, trach, and peg    Peripheral vascular disease  · Chronic issue  · Hold Aspirin and Plavix with GIB  · Continue Statin        VTE Risk Mitigation (From admission, onward)         Ordered     IP VTE HIGH RISK PATIENT  Once         09/23/22 1409     Place sequential compression device  Until discontinued         09/23/22 1408                Discharge Planning   NISA: 9/30/2022     Code Status: Full Code   Is the patient  medically ready for discharge?: No    Reason for patient still in hospital (select all that apply): Imaging  Discharge Plan A: Home with family                  Brenda Saldivar MD  Department of Hospital Medicine   [facility] - Endoscopy     no

## 2025-07-18 NOTE — ASSESSMENT & PLAN NOTE
12/15/21 FNA left neck mass : squamous cell carcinoma, p16 negative  1/4/22 total glossectomy, bilateral neck dissection, bilateral cervical facial advancement flaps and anterolateral thigh free flap reconstruction of his glossectomy defect.  Final path :  [id number] (+microscopic SALINAS, single contralateral node), superior soft tissue margin of left neck with tumor.  2/28/22 start chemoradiation  Finished chemo with cisplatin 4/6. Was going to complete final dose of radiation tomorrow.     -Rad/onc consulted for radiation while inpatient  4/22  s/p radiation oncology eval -on adjuvant chemoradiation, completed 31/33 fractions of RT.  renitiation of RT  inpatient when patient able to tolerate.    4/23  completed radiation sessions  5/3 -attempting to  wean oxygen, takes 5 liters at home and has all supplies.    Yes

## (undated) DEVICE — GUIDE LAUNCHER 6FR EBU 3.5

## (undated) DEVICE — SUT VICRYL PLUS 3-0 SH 18IN

## (undated) DEVICE — PAD DEFIB CADENCE ADULT R2

## (undated) DEVICE — SOL NS 1000CC

## (undated) DEVICE — SEE L#95700

## (undated) DEVICE — CUP MEDICINE GRADUATED 1OZ

## (undated) DEVICE — PAD RADI FEMORAL

## (undated) DEVICE — SYR 3CC LUER LOC

## (undated) DEVICE — CATH NC EMERGE MR 3.25X20MM

## (undated) DEVICE — WIRE GUIDE SAFE-T-J .035 260CM

## (undated) DEVICE — BLADE SURG #15 CARBON STEEL

## (undated) DEVICE — CATH 5FR MP 125CM 5/BX

## (undated) DEVICE — CLIP MED TICALL

## (undated) DEVICE — SUT SILK 2-0 SH 18IN BLACK

## (undated) DEVICE — SEE MEDLINE ITEM 146292

## (undated) DEVICE — SUT VICRYL 3-0 27 SH

## (undated) DEVICE — GAUZE SPONGE PEANUT STRL

## (undated) DEVICE — CATH ANGIO PRO FLO XT PGTL 6F

## (undated) DEVICE — SEE MEDLINE ITEM 157150

## (undated) DEVICE — HOLDER TRACH TUBE NECKBAND

## (undated) DEVICE — CLAMP SINGLE MICRO.

## (undated) DEVICE — Device

## (undated) DEVICE — KIT GLIDESHEATH SLEND 6FR 10CM

## (undated) DEVICE — DISSECTOR LIGASURE EXACT 21CM

## (undated) DEVICE — PAD K-THERMIA 24IN X 60IN

## (undated) DEVICE — CATH NC EMERGE MR 3X20MM

## (undated) DEVICE — CATH ANG PIGTAIL 4FR INFINITY

## (undated) DEVICE — KIT MEROCEL INSTRUMENT WIPE

## (undated) DEVICE — CORD BIPOLAR 12 FOOT

## (undated) DEVICE — PROBE CATH TEMP 16 FRFOLEY 400

## (undated) DEVICE — ELECTRODE BLADE INSULATED 1 IN

## (undated) DEVICE — TRAY MINOR GEN SURG

## (undated) DEVICE — BOOT AIR FLUID HEEL ADLT STD

## (undated) DEVICE — LUBRICANT SURGILUBE 2 OZ

## (undated) DEVICE — STAPLER SKIN PROXIMATE WIDE

## (undated) DEVICE — SUCTION SURGICAL FRAZR

## (undated) DEVICE — CATH DXTERITY 3DRC 100CM 6FR

## (undated) DEVICE — VALVE CONTROL COPILOT

## (undated) DEVICE — GUIDEWIRE UNIGLIDE .035 X 180

## (undated) DEVICE — SUT SILK 3-0 RB-1 30IN BLK

## (undated) DEVICE — CATH IV INTROCAN 22G X 1

## (undated) DEVICE — PROBE FLOW DOPPLER

## (undated) DEVICE — ELECTRODE REM PLYHSV RETURN 9

## (undated) DEVICE — CATH EMERGE MR 12 X 2.50

## (undated) DEVICE — KIT MANIFOLD LOW PRESS TUBING

## (undated) DEVICE — TOWEL OR DISP STRL BLUE 4/PK

## (undated) DEVICE — CATH TELESCOPE GUIDE EXT 6FR

## (undated) DEVICE — SKINMARKER & RULER REGULAR X-F

## (undated) DEVICE — SET DECANTER MEDICHOICE

## (undated) DEVICE — OMNIPAQUE 350MG 150ML VIAL

## (undated) DEVICE — SUT 9/0 5IN ETHILON BLK MON

## (undated) DEVICE — NDL HYPO REG 25G X 1 1/2

## (undated) DEVICE — SUT 3-0 12-18IN SILK

## (undated) DEVICE — SEALER LIGASURE CRV 18.8CM

## (undated) DEVICE — PACK CATH LAB

## (undated) DEVICE — KIT URINARY CATH URINE METER

## (undated) DEVICE — KIT ESSENTIALS W/ Y ADAPTER

## (undated) DEVICE — KIT INTRODUCER MICROPUNCTR 4F

## (undated) DEVICE — SPONGE LAP 18X18 PREWASHED

## (undated) DEVICE — MICRO CLIP

## (undated) DEVICE — CONTAINER SPECIMEN STRL 4OZ

## (undated) DEVICE — SUT CTD VICRYL 3-0 CR/SH

## (undated) DEVICE — SEE MEDLINE ITEM 157194

## (undated) DEVICE — GUIDEWIRE STIFF ANG .035 X 260

## (undated) DEVICE — GUIDEWIRE PROWATER .014X180CM

## (undated) DEVICE — SEE MEDLINE ITEM 154981

## (undated) DEVICE — CATH NC EMERGE MR 2.75X12MM

## (undated) DEVICE — DRAPE STERI INSTRUMENT 1018

## (undated) DEVICE — COVER LIGHT HANDLE 80/CA

## (undated) DEVICE — CATH DXTERITY JL40 100CM 6FR

## (undated) DEVICE — SUT 2/0 30IN SILK BLK BRAI

## (undated) DEVICE — CATH EAGLE EYE PLATINUM

## (undated) DEVICE — SUT MCRYL PLUS 4-0 PS2 27IN

## (undated) DEVICE — DRAPE OPMI STERILE

## (undated) DEVICE — SUT ETHILON 3-0 PS2 18 BLK

## (undated) DEVICE — PRESTO INFLATION DEVICE

## (undated) DEVICE — SUT LIGACLIP SMALL XTRA

## (undated) DEVICE — SEE MEDLINE ITEM 157128

## (undated) DEVICE — SUT 2-0 12-18IN SILK

## (undated) DEVICE — GUIDEWIRE TORQUE 3CM/260CML

## (undated) DEVICE — HOOK LONE STAR BLUNT 12MM

## (undated) DEVICE — SUT 2-0 SILK 30IN BLK BRAID

## (undated) DEVICE — KIT PEG PULL STANDARD 20F

## (undated) DEVICE — SUT COATED VICRYL 4/0 27IN

## (undated) DEVICE — SHEET EENT SPLIT

## (undated) DEVICE — GOWN SURGICAL X-LARGE

## (undated) DEVICE — SHEATH INTRO PINNACLE 6F 10CM

## (undated) DEVICE — SPONGE WEC CEL SPEARS

## (undated) DEVICE — TRACH TUBE CUFF FLEX DISP 7.5

## (undated) DEVICE — GUIDE WIRE BMW 014 X190

## (undated) DEVICE — DRESSING ADH ISLAND 3.6 X 14

## (undated) DEVICE — HEMOSTAT VASC BAND REG 24CM

## (undated) DEVICE — SUT SILK 3-0 SH 18IN BLACK

## (undated) DEVICE — KIT SAHARA DRAPE DRAW/LIFT

## (undated) DEVICE — CLIP DOUBLE MICRO.

## (undated) DEVICE — EVACUATOR WOUND BULB 100CC

## (undated) DEVICE — DRESSING AQUACEL SACRAL 9 X 9

## (undated) DEVICE — PACK UNIVERSAL SPLIT II

## (undated) DEVICE — KIT HAND CONTROL HIGH PRESSUR

## (undated) DEVICE — GUIDE MICRO-GRID SIL GRN

## (undated) DEVICE — TRAY FOLEY 16FR INFECTION CONT

## (undated) DEVICE — CATH IV INTROCAN 20G X 1.1

## (undated) DEVICE — SOL WATER STRL IRR 1000ML

## (undated) DEVICE — KIT SYR REUSABLE

## (undated) DEVICE — DRAIN CHANNEL ROUND 15FR